# Patient Record
Sex: MALE | Race: WHITE | NOT HISPANIC OR LATINO | Employment: FULL TIME | URBAN - METROPOLITAN AREA
[De-identification: names, ages, dates, MRNs, and addresses within clinical notes are randomized per-mention and may not be internally consistent; named-entity substitution may affect disease eponyms.]

---

## 2017-11-07 ENCOUNTER — HOSPITAL ENCOUNTER (EMERGENCY)
Facility: HOSPITAL | Age: 32
Discharge: HOME | End: 2017-11-07
Attending: EMERGENCY MEDICINE
Payer: COMMERCIAL

## 2017-11-07 VITALS
SYSTOLIC BLOOD PRESSURE: 134 MMHG | WEIGHT: 190 LBS | RESPIRATION RATE: 16 BRPM | BODY MASS INDEX: 23.14 KG/M2 | OXYGEN SATURATION: 97 % | HEART RATE: 63 BPM | HEIGHT: 76 IN | DIASTOLIC BLOOD PRESSURE: 84 MMHG | TEMPERATURE: 98.5 F

## 2017-11-07 DIAGNOSIS — F14.10 COCAINE ABUSE (HCC): Primary | ICD-10-CM

## 2017-11-07 LAB
AMPHETAMINES SERPL QL SCN: NEGATIVE
ANION GAP SERPL CALCULATED.3IONS-SCNC: 9 MMOL/L (ref 4–13)
ATRIAL RATE: 70 BPM
BARBITURATES UR QL: NEGATIVE
BASOPHILS # BLD AUTO: 0.06 THOUSANDS/ΜL (ref 0–0.1)
BASOPHILS NFR BLD AUTO: 1 % (ref 0–1)
BENZODIAZ UR QL: NEGATIVE
BUN SERPL-MCNC: 6 MG/DL (ref 5–25)
CALCIUM SERPL-MCNC: 9.1 MG/DL (ref 8.3–10.1)
CHLORIDE SERPL-SCNC: 103 MMOL/L (ref 100–108)
CLARITY, POC: CLEAR
CO2 SERPL-SCNC: 27 MMOL/L (ref 21–32)
COCAINE UR QL: POSITIVE
COLOR, POC: YELLOW
CREAT SERPL-MCNC: 0.78 MG/DL (ref 0.6–1.3)
EOSINOPHIL # BLD AUTO: 0.14 THOUSAND/ΜL (ref 0–0.61)
EOSINOPHIL NFR BLD AUTO: 2 % (ref 0–6)
ERYTHROCYTE [DISTWIDTH] IN BLOOD BY AUTOMATED COUNT: 13.8 % (ref 11.6–15.1)
ETHANOL SERPL-MCNC: <3 MG/DL (ref 0–3)
EXT BILIRUBIN, UA: NORMAL
EXT BLOOD URINE: NEGATIVE
EXT GLUCOSE, UA: NEGATIVE
EXT KETONES: NEGATIVE
EXT NITRITE, UA: NEGATIVE
EXT PH, UA: 6.5
EXT PROTEIN, UA: NEGATIVE
EXT SPECIFIC GRAVITY, UA: 1.01
EXT UROBILINOGEN: NEGATIVE
GFR SERPL CREATININE-BSD FRML MDRD: 121 ML/MIN/1.73SQ M
GLUCOSE SERPL-MCNC: 87 MG/DL (ref 65–140)
HCT VFR BLD AUTO: 45.8 % (ref 36.5–49.3)
HGB BLD-MCNC: 15.7 G/DL (ref 12–17)
LYMPHOCYTES # BLD AUTO: 2.25 THOUSANDS/ΜL (ref 0.6–4.47)
LYMPHOCYTES NFR BLD AUTO: 24 % (ref 14–44)
MCH RBC QN AUTO: 32.2 PG (ref 26.8–34.3)
MCHC RBC AUTO-ENTMCNC: 34.3 G/DL (ref 31.4–37.4)
MCV RBC AUTO: 94 FL (ref 82–98)
METHADONE UR QL: NEGATIVE
MONOCYTES # BLD AUTO: 0.7 THOUSAND/ΜL (ref 0.17–1.22)
MONOCYTES NFR BLD AUTO: 8 % (ref 4–12)
NEUTROPHILS # BLD AUTO: 6.19 THOUSANDS/ΜL (ref 1.85–7.62)
NEUTS SEG NFR BLD AUTO: 65 % (ref 43–75)
OPIATES UR QL SCN: NEGATIVE
P AXIS: 59 DEGREES
PCP UR QL: NEGATIVE
PLATELET # BLD AUTO: 208 THOUSANDS/UL (ref 149–390)
PMV BLD AUTO: 9.8 FL (ref 8.9–12.7)
POTASSIUM SERPL-SCNC: 3.8 MMOL/L (ref 3.5–5.3)
PR INTERVAL: 190 MS
QRS AXIS: 86 DEGREES
QRSD INTERVAL: 94 MS
QT INTERVAL: 374 MS
QTC INTERVAL: 395 MS
RBC # BLD AUTO: 4.88 MILLION/UL (ref 3.88–5.62)
SODIUM SERPL-SCNC: 139 MMOL/L (ref 136–145)
T WAVE AXIS: 68 DEGREES
THC UR QL: POSITIVE
VENTRICULAR RATE: 67 BPM
WBC # BLD AUTO: 9.34 THOUSAND/UL (ref 4.31–10.16)
WBC # BLD EST: NEGATIVE 10*3/UL

## 2017-11-07 PROCEDURE — 36415 COLL VENOUS BLD VENIPUNCTURE: CPT | Performed by: EMERGENCY MEDICINE

## 2017-11-07 PROCEDURE — 99284 EMERGENCY DEPT VISIT MOD MDM: CPT

## 2017-11-07 PROCEDURE — 81002 URINALYSIS NONAUTO W/O SCOPE: CPT | Performed by: EMERGENCY MEDICINE

## 2017-11-07 PROCEDURE — 80320 DRUG SCREEN QUANTALCOHOLS: CPT | Performed by: EMERGENCY MEDICINE

## 2017-11-07 PROCEDURE — 93005 ELECTROCARDIOGRAM TRACING: CPT

## 2017-11-07 PROCEDURE — 80048 BASIC METABOLIC PNL TOTAL CA: CPT | Performed by: EMERGENCY MEDICINE

## 2017-11-07 PROCEDURE — 85025 COMPLETE CBC W/AUTO DIFF WBC: CPT | Performed by: EMERGENCY MEDICINE

## 2017-11-07 PROCEDURE — 80307 DRUG TEST PRSMV CHEM ANLYZR: CPT | Performed by: EMERGENCY MEDICINE

## 2017-11-07 RX ORDER — SULFAMETHOXAZOLE AND TRIMETHOPRIM 800; 160 MG/1; MG/1
1 TABLET ORAL 2 TIMES DAILY
Qty: 14 TABLET | Refills: 0 | Status: CANCELLED | OUTPATIENT
Start: 2017-11-07 | End: 2017-11-14

## 2017-11-07 NOTE — ED NOTES
D/C instructions discussed  Educated on f/u, self care and s/s of when to return to the ED  All questions answered  Ambulatory off unit with steady gait and no s/s of acute distress  D/C also provided by CW  Friend at bedside to drive pt home        Nicola Cee RN  11/07/17 2299

## 2017-11-07 NOTE — DISCHARGE INSTRUCTIONS
Cocaine Abuse, Ambulatory Care   GENERAL INFORMATION:   Cocaine abuse  develops when you need more cocaine to get the same feelings of happiness you got from lower amounts  You may have cocaine withdrawal if you have used cocaine for a long time and you suddenly use less or stop using it  Common symptoms include the following:   · Use of more cocaine than you first wanted to use     · No ability to decrease or control your use of cocaine    · Spending much of your time using cocaine, or dealing with a hangover after you use cocaine    · Less time spent around others, at work, or doing activities that you enjoy    · Continued cocaine use, even when it causes physical or mental problems  Signs and symptoms of cocaine withdrawal:   · Seizure    · Sweating, shaking, or a fast heartbeat    · Seeing, hearing, or feeling things that are not really there    · Unpleasant dreams that seem real    · Severe sadness or fatigue    · Restlessness, nervousness, or anxiety    · Trouble sleeping or difficulty waking up    · Nausea or vomiting  Seek immediate care for the following symptoms:   · Chest pain, sweating, or shortness of breath    · Weakness on one side of your body    · Seizure    · Severe headache, confusion, or feeling very nervous    · Fever over 101°F (38 3°C) after you use cocaine    · Coughing or spitting up blood    · Feeling like hurting yourself or someone else    · Severe abdominal pain  Follow up with your healthcare provider as directed:  Write down your questions so you remember to ask them during your visits  CARE AGREEMENT:   You have the right to help plan your care  Learn about your health condition and how it may be treated  Discuss treatment options with your caregivers to decide what care you want to receive  You always have the right to refuse treatment  The above information is an  only  It is not intended as medical advice for individual conditions or treatments   Talk to your doctor, nurse or pharmacist before following any medical regimen to see if it is safe and effective for you  © 2014 [address] is for End User's use only and may not be sold, redistributed or otherwise used for commercial purposes  All illustrations and images included in CareNotes® are the copyrighted property of [employer]  or Ronnie Pelayo  Cellulitis, Ambulatory Care   GENERAL INFORMATION:   Cellulitis  is a skin infection caused by bacteria  Common symptoms include the following:   · Fever    · A red, warm, swollen area on your skin    · Pain when the area is touched    · Bumps or blisters (abscess) that may drain pus    · Bumpy, raised skin that feels like an orange peel  Seek immediate care for the following symptoms:   · An increase in pain, redness, warmth, and size    · Red streaks coming from the infected area    · A thin, gray-brown discharge coming from your infected skin area    · A crackling under your skin when you touch it    · Purple dots or bumps on your skin, or bleeding under your skin    · New swelling and pain in your legs    · Sudden trouble breathing or chest pain  Treatment for cellulitis  may include medicines to treat the bacterial infection or decrease pain  The infection may need to be cleaned out  Damaged, dead, or infected tissue may need to be cut away to help your wound heal   Manage your symptoms:   · Elevate your wound above the level of your heart  as often as you can  This will help decrease swelling and pain  Prop your wound on pillows or blankets to keep it elevated comfortably  · Clean your wound as directed  You may need to wash the wound with soap and water  Look for signs of infection  · Wear pressure stockings as directed  The stockings are tight and put pressure on your legs  This improves blood flow and decreases swelling  Prevent cellulitis:   · Wash your hands often  Use soap and water   Wash your hands after you use the bathroom, change a child's diapers, or sneeze  Wash your hands before you prepare or eat food  Use lotion to prevent dry, cracked skin  · Do not share personal items, such as towels, clothing, and razors  · Clean exercise equipment  with germ-killing  before and after you use it  Follow up with your healthcare provider as directed:  Write down your questions so you remember to ask them during your visits  CARE AGREEMENT:   You have the right to help plan your care  Learn about your health condition and how it may be treated  Discuss treatment options with your caregivers to decide what care you want to receive  You always have the right to refuse treatment  The above information is an  only  It is not intended as medical advice for individual conditions or treatments  Talk to your doctor, nurse or pharmacist before following any medical regimen to see if it is safe and effective for you  © 2014 [address] is for End User's use only and may not be sold, redistributed or otherwise used for commercial purposes  All illustrations and images included in CareNotes® are the copyrighted property of [employer]  or Ronnie Pelayo

## 2017-11-07 NOTE — ED PROVIDER NOTES
History  Chief Complaint   Patient presents with    Detox Evaluation     Pt states that he uses cocaine and Dorie Spies and would like to get rehab  Pt states the last time he used cocaine was last night  Pt states that he uses everyday for about 2 years  History provided by:  Patient  Detox Evaluation   Similar prior episodes: yes    Severity:  Moderate  Onset quality:  Gradual  Timing:  Constant  Progression:  Worsening  Chronicity:  New  Suspected agents:  Alcohol, cocaine and marijuana  Associated symptoms: no abdominal pain, no confusion, no headaches, no nausea, no shortness of breath, no suicidal ideation (At times has had suicidal ideation in the past) and no weakness    Risk factors: mental illness and psychiatric hx    Risk factors: no chronic illness        None       Past Medical History:   Diagnosis Date    Depression     Psychiatric disorder     bipolar       History reviewed  No pertinent surgical history  History reviewed  No pertinent family history  I have reviewed and agree with the history as documented  Social History   Substance Use Topics    Smoking status: Current Every Day Smoker    Smokeless tobacco: Never Used    Alcohol use Yes      Comment: 2 cases of beer daily         Review of Systems   Constitutional: Negative for chills and fever  HENT: Negative for rhinorrhea, sore throat and trouble swallowing  Eyes: Negative for pain  Respiratory: Negative for cough, shortness of breath, wheezing and stridor  Cardiovascular: Negative for chest pain and leg swelling  Gastrointestinal: Negative for abdominal pain, diarrhea and nausea  Endocrine: Negative for polyuria  Genitourinary: Negative for dysuria, flank pain and urgency  Musculoskeletal: Negative for joint swelling, myalgias and neck stiffness  Skin: Negative for rash  Allergic/Immunologic: Negative for immunocompromised state     Neurological: Negative for dizziness, syncope, weakness, numbness and headaches  Psychiatric/Behavioral: Negative for confusion and suicidal ideas (At times has had suicidal ideation in the past)  All other systems reviewed and are negative  Physical Exam  ED Triage Vitals [11/07/17 1347]   Temperature Pulse Respirations Blood Pressure SpO2   98 5 °F (36 9 °C) 66 16 145/90 100 %      Temp Source Heart Rate Source Patient Position - Orthostatic VS BP Location FiO2 (%)   Oral Monitor Sitting Right arm --      Pain Score       No Pain           Orthostatic Vital Signs  Vitals:    11/07/17 1347 11/07/17 1446 11/07/17 1647 11/07/17 1747   BP: 145/90 138/76 144/81 134/84   Pulse: 66 68 62 63   Patient Position - Orthostatic VS: Sitting Lying Sitting Sitting       Physical Exam   Constitutional: He is oriented to person, place, and time  He appears well-developed and well-nourished  HENT:   Head: Normocephalic and atraumatic  Eyes: EOM are normal  Pupils are equal, round, and reactive to light  Neck: Normal range of motion  Neck supple  Cardiovascular: Normal rate and regular rhythm  Exam reveals no friction rub  No murmur heard  Pulmonary/Chest: Breath sounds normal  No respiratory distress  He has no wheezes  He has no rales  Abdominal: Soft  Bowel sounds are normal  He exhibits no distension  There is no tenderness  Musculoskeletal: Normal range of motion  He exhibits no edema or tenderness  Neurological: He is alert and oriented to person, place, and time  Skin: Skin is warm  No rash noted  Psychiatric:   Tearful states he has had thoughts to try to kill himself before  Nursing note and vitals reviewed        ED Medications  Medications - No data to display    Diagnostic Studies  Results Reviewed     Procedure Component Value Units Date/Time    Ethanol [[id number]]  (Normal) Collected:  11/07/17 1416    Lab Status:  Final result Specimen:  Blood from Arm, Right Updated:  11/07/17 1438     Ethanol Lvl <3 mg/dL     Basic metabolic panel [[id number]] Collected: 11/07/17 1416    Lab Status:  Final result Specimen:  Blood from Arm, Right Updated:  11/07/17 1435     Sodium 139 mmol/L      Potassium 3 8 mmol/L      Chloride 103 mmol/L      CO2 27 mmol/L      Anion Gap 9 mmol/L      BUN 6 mg/dL      Creatinine 0 78 mg/dL      Glucose 87 mg/dL      Calcium 9 1 mg/dL      eGFR 121 ml/min/1 73sq m     Narrative:         National Kidney Disease Education Program recommendations are as follows:  GFR calculation is accurate only with a steady state creatinine  Chronic Kidney disease less than 60 ml/min/1 73 sq  meters  Kidney failure less than 15 ml/min/1 73 sq  meters  Rapid drug screen, urine [[id number]]  (Abnormal) Collected:  11/07/17 1419    Lab Status:  Final result Specimen:  Urine from Urine, Clean Catch Updated:  11/07/17 1435     Amph/Meth UR Negative     Barbiturate Ur Negative     Benzodiazepine Urine Negative     Cocaine Urine Positive (A)     Methadone Urine Negative     Opiate Urine Negative     PCP Ur Negative     THC Urine Positive (A)    Narrative:         Presumptive report  If requested, specimen will be sent to reference lab for confirmation  FOR MEDICAL PURPOSES ONLY  IF CONFIRMATION NEEDED PLEASE CONTACT THE LAB WITHIN 5 DAYS      Drug Screen Cutoff Levels:  AMPHETAMINE/METHAMPHETAMINES  1000 ng/mL  BARBITURATES     200 ng/mL  BENZODIAZEPINES     200 ng/mL  COCAINE      300 ng/mL  METHADONE      300 ng/mL  OPIATES      300 ng/mL  PHENCYCLIDINE     25 ng/mL  THC       50 ng/mL    CBC and differential [[id number]]  (Normal) Collected:  11/07/17 1416    Lab Status:  Final result Specimen:  Blood from Arm, Right Updated:  11/07/17 1429     WBC 9 34 Thousand/uL      RBC 4 88 Million/uL      Hemoglobin 15 7 g/dL      Hematocrit 45 8 %      MCV 94 fL      MCH 32 2 pg      MCHC 34 3 g/dL      RDW 13 8 %      MPV 9 8 fL      Platelets 686 Thousands/uL      Neutrophils Relative 65 %      Lymphocytes Relative 24 %      Monocytes Relative 8 %      Eosinophils Relative 2 %      Basophils Relative 1 %      Neutrophils Absolute 6 19 Thousands/µL      Lymphocytes Absolute 2 25 Thousands/µL      Monocytes Absolute 0 70 Thousand/µL      Eosinophils Absolute 0 14 Thousand/µL      Basophils Absolute 0 06 Thousands/µL     POCT urinalysis dipstick [[id number]]  (Normal) Resulted:  11/07/17 1423    Lab Status:  Final result Specimen:  Urine Updated:  11/07/17 1424     Color, UA yellow     Clarity, UA clear     EXT Glucose, UA negative     EXT Bilirubin, UA (Ref: Negative) small     EXT Ketones, UA (Ref: Negative) negative     EXT Spec Grav, UA 1 010     EXT Blood, UA (Ref: Negative) negative     EXT pH, UA 6 5     EXT Protein, UA (Ref: Negative) negative     EXT Urobilinogen, UA (Ref: 0 2- 1 0) negative     EXT Leukocytes, UA (Ref: Negative) negative     EXT Nitrite, UA (Ref: Negative) negative                 No orders to display              Procedures  ECG 12 Lead Documentation  Date/Time: 11/7/2017 2:00 PM  Performed by: Winsome Elizabeth by: Huma Russo     ECG reviewed by me, the ED Provider: yes    Patient location:  ED  Previous ECG:     Previous ECG:  Compared to current    Similarity:  No change  Interpretation:     Interpretation: normal    Rate:     ECG rate assessment: normal    Rhythm:     Rhythm: sinus rhythm    Ectopy:     Ectopy: none    QRS:     QRS axis:  Normal    QRS intervals:  Normal  Conduction:     Conduction: normal    ST segments:     ST segments:  Normal  T waves:     T waves: normal             Phone Contacts  ED Phone Contact    ED Course  ED Course                                MDM  Number of Diagnoses or Management Options  Diagnosis management comments: 2:41 PM patient is medically clear for psych eval, no acute signs of withdrawal at this time in the ED            Amount and/or Complexity of Data Reviewed  Clinical lab tests: ordered and reviewed      CritCare Time    Disposition  Final diagnoses:   Cocaine abuse     Time reflects when diagnosis was documented in both MDM as applicable and the Disposition within this note     Time User Action Codes Description Comment    11/7/2017  4:36 PM Med Home Add [R79 310] Cellulitis of right upper extremity     11/7/2017  4:37 PM Med Home Add [F11 10] Heroin abuse     11/7/2017  4:38 PM Med Home Modify [F11 10] Heroin abuse     11/7/2017  4:38 PM Med Home Remove [K54 383] Cellulitis of right upper extremity     11/7/2017  4:38 PM Med Home Remove [F11 10] Heroin abuse     11/7/2017  4:38 PM Med Home Add [F14 10] Cocaine abuse       ED Disposition     ED Disposition Condition Comment    Discharge  Hemanth Swanson discharge to home/self care  Condition at discharge: Good        Follow-up Information    None       There are no discharge medications for this patient  No discharge procedures on file      ED Provider  Electronically Signed by           Leopold Schmitt, DO  11/08/17 4944

## 2017-11-07 NOTE — ED NOTES
Spoke with [email or web address] Hermila from [facility] crisis  Patient spoke with [email or web address] Hermila  Patient is looking for help  Stated to [employer] that he uses $300  in crack and two 30 count cases of beer per day  Patient stated his last use was 02:00 today      I explained to [employer] would have to be medically cleared before we could speak to patient about treatment       Pham Valentin  11/07/17 4006

## 2017-11-07 NOTE — ED NOTES
Pt was brought to ED by his cousin  Initially he stated he was seeking inpatient treatment for substance use  Pt admits to using alcohol, cannabis and crack  States when he drinks he drinks up to 2 cases of beer per day, $20 of marijuana and up to $300 worth of crack  Pt states he does not use everyday  He denies SI, HI, A/H or V/H  He did however admit to some depression  After Intake / SA was completed pt stated he did not want to do a 30 day inpatient program   Pt was willing to try outpatient treatment which he states he had success with in the past   Pt does not have insurance  Pt was provided with contact information for [facility] and   Pt was encouraged to return to ED if symptoms worsened and to call [facility] crisis if need be

## 2020-09-13 ENCOUNTER — APPOINTMENT (EMERGENCY)
Dept: CT IMAGING | Facility: HOSPITAL | Age: 35
End: 2020-09-13
Payer: COMMERCIAL

## 2020-09-13 ENCOUNTER — HOSPITAL ENCOUNTER (EMERGENCY)
Facility: HOSPITAL | Age: 35
Discharge: HOME | End: 2020-09-13
Payer: COMMERCIAL

## 2020-09-13 VITALS
HEIGHT: 76 IN | DIASTOLIC BLOOD PRESSURE: 90 MMHG | OXYGEN SATURATION: 97 % | BODY MASS INDEX: 25.57 KG/M2 | WEIGHT: 210 LBS | SYSTOLIC BLOOD PRESSURE: 141 MMHG | TEMPERATURE: 97.6 F | RESPIRATION RATE: 18 BRPM | HEART RATE: 103 BPM

## 2020-09-13 DIAGNOSIS — S30.1XXA CONTUSION OF ABDOMINAL WALL, INITIAL ENCOUNTER: ICD-10-CM
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
## 2020-09-13 DIAGNOSIS — S16.1XXA STRAIN OF NECK MUSCLE, INITIAL ENCOUNTER: ICD-10-CM

## 2020-09-13 LAB
ALBUMIN SERPL BCP-MCNC: 4.5 G/DL (ref 3.4–4.8)
ALP SERPL-CCNC: 108.7 U/L (ref 10–129)
ALT SERPL W P-5'-P-CCNC: 82 U/L (ref 5–63)
ANION GAP SERPL CALCULATED.3IONS-SCNC: 9 MMOL/L (ref 4–13)
APTT PPP: 29 SECONDS (ref 23–31)
AST SERPL W P-5'-P-CCNC: 39 U/L (ref 15–41)
BASOPHILS # BLD AUTO: 0.04 THOUSANDS/ΜL (ref 0–0.1)
BASOPHILS NFR BLD AUTO: 0 % (ref 0–1)
BILIRUB SERPL-MCNC: 0.72 MG/DL (ref 0.3–1.2)
BILIRUB UR QL STRIP: NEGATIVE
BUN SERPL-MCNC: 13 MG/DL (ref 6–20)
CALCIUM SERPL-MCNC: 9.5 MG/DL (ref 8.4–10.2)
CHLORIDE SERPL-SCNC: 106 MMOL/L (ref 96–108)
CLARITY UR: CLEAR
CO2 SERPL-SCNC: 25 MMOL/L (ref 22–33)
COLOR UR: YELLOW
CREAT SERPL-MCNC: 0.77 MG/DL (ref 0.5–1.2)
EOSINOPHIL # BLD AUTO: 0.17 THOUSAND/ΜL (ref 0–0.61)
EOSINOPHIL NFR BLD AUTO: 2 % (ref 0–6)
ERYTHROCYTE [DISTWIDTH] IN BLOOD BY AUTOMATED COUNT: 14 % (ref 11.6–15.1)
GFR SERPL CREATININE-BSD FRML MDRD: 119 ML/MIN/1.73SQ M
GLUCOSE SERPL-MCNC: 100 MG/DL (ref 65–140)
GLUCOSE UR STRIP-MCNC: NEGATIVE MG/DL
HCT VFR BLD AUTO: 46.7 % (ref 36.5–49.3)
HGB BLD-MCNC: 16 G/DL (ref 12–17)
HGB UR QL STRIP.AUTO: NEGATIVE
IMM GRANULOCYTES # BLD AUTO: 0.02 THOUSAND/UL (ref 0–0.2)
IMM GRANULOCYTES NFR BLD AUTO: 0 % (ref 0–2)
INR PPP: 1.01 (ref 0.9–1.1)
KETONES UR STRIP-MCNC: NEGATIVE MG/DL
LEUKOCYTE ESTERASE UR QL STRIP: NEGATIVE
LYMPHOCYTES # BLD AUTO: 2.58 THOUSANDS/ΜL (ref 0.6–4.47)
LYMPHOCYTES NFR BLD AUTO: 28 % (ref 14–44)
MCH RBC QN AUTO: 31.3 PG (ref 26.8–34.3)
MCHC RBC AUTO-ENTMCNC: 34.3 G/DL (ref 31.4–37.4)
MCV RBC AUTO: 91 FL (ref 82–98)
MONOCYTES # BLD AUTO: 0.71 THOUSAND/ΜL (ref 0.17–1.22)
MONOCYTES NFR BLD AUTO: 8 % (ref 4–12)
NEUTROPHILS # BLD AUTO: 5.82 THOUSANDS/ΜL (ref 1.85–7.62)
NEUTS SEG NFR BLD AUTO: 62 % (ref 43–75)
NITRITE UR QL STRIP: NEGATIVE
PH UR STRIP.AUTO: 6 [PH]
PLATELET # BLD AUTO: 243 THOUSANDS/UL (ref 149–390)
PMV BLD AUTO: 9.9 FL (ref 8.9–12.7)
POTASSIUM SERPL-SCNC: 3.9 MMOL/L (ref 3.5–5)
PROT SERPL-MCNC: 7.3 G/DL (ref 6.4–8.3)
PROT UR STRIP-MCNC: NEGATIVE MG/DL
PROTHROMBIN TIME: 10.7 SECONDS (ref 9.5–12.1)
RBC # BLD AUTO: 5.12 MILLION/UL (ref 3.88–5.62)
SODIUM SERPL-SCNC: 140 MMOL/L (ref 133–145)
SP GR UR STRIP.AUTO: <=1.005 (ref 1–1.03)
UROBILINOGEN UR QL STRIP.AUTO: 0.2 E.U./DL
WBC # BLD AUTO: 9.34 THOUSAND/UL (ref 4.31–10.16)

## 2020-09-13 PROCEDURE — G1004 CDSM NDSC: HCPCS

## 2020-09-13 PROCEDURE — 36415 COLL VENOUS BLD VENIPUNCTURE: CPT

## 2020-09-13 PROCEDURE — 80053 COMPREHEN METABOLIC PANEL: CPT

## 2020-09-13 PROCEDURE — 85730 THROMBOPLASTIN TIME PARTIAL: CPT

## 2020-09-13 PROCEDURE — 99284 EMERGENCY DEPT VISIT MOD MDM: CPT

## 2020-09-13 PROCEDURE — 74177 CT ABD & PELVIS W/CONTRAST: CPT

## 2020-09-13 PROCEDURE — 85025 COMPLETE CBC W/AUTO DIFF WBC: CPT

## 2020-09-13 PROCEDURE — 99285 EMERGENCY DEPT VISIT HI MDM: CPT

## 2020-09-13 PROCEDURE — 85610 PROTHROMBIN TIME: CPT

## 2020-09-13 PROCEDURE — 81003 URINALYSIS AUTO W/O SCOPE: CPT

## 2020-09-13 PROCEDURE — 72125 CT NECK SPINE W/O DYE: CPT

## 2020-09-13 RX ORDER — CYCLOBENZAPRINE HCL 10 MG
10 TABLET ORAL 2 TIMES DAILY PRN
Qty: 20 TABLET | Refills: 0 | Status: SHIPPED | OUTPATIENT
Start: 2020-09-13

## 2020-09-13 RX ORDER — NAPROXEN 500 MG/1
500 TABLET ORAL 2 TIMES DAILY WITH MEALS
Qty: 30 TABLET | Refills: 0 | Status: SHIPPED | OUTPATIENT
Start: 2020-09-13

## 2020-09-13 RX ADMIN — IOHEXOL 100 ML: 350 INJECTION, SOLUTION INTRAVENOUS at 11:14

## 2020-09-13 NOTE — ED NOTES
202 Ferny Jansen, RN  09/13/20 5360
Patient transported to [address] [place] [address] Ebony Rai RN  09/13/20 1111
120
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
## 2020-09-13 NOTE — Clinical Note
Jaspal Adam was seen and treated in our emergency department on 9/13/2020  Diagnosis:     Bernice Stockton  may return to work on return date  He may return on this date: 09/15/2020         If you have any questions or concerns, please don't hesitate to call        Chong Anderson MD    ______________________________           _______________          _______________  Hospital Representative                              Date                                Time

## 2021-06-18 ENCOUNTER — HOSPITAL ENCOUNTER (EMERGENCY)
Facility: HOSPITAL | Age: 36
Discharge: HOME | End: 2021-06-18
Attending: EMERGENCY MEDICINE | Admitting: EMERGENCY MEDICINE
Payer: COMMERCIAL

## 2021-06-18 ENCOUNTER — APPOINTMENT (EMERGENCY)
Dept: ULTRASOUND IMAGING | Facility: HOSPITAL | Age: 36
End: 2021-06-18
Payer: COMMERCIAL

## 2021-06-18 VITALS
RESPIRATION RATE: 15 BRPM | DIASTOLIC BLOOD PRESSURE: 85 MMHG | OXYGEN SATURATION: 98 % | SYSTOLIC BLOOD PRESSURE: 125 MMHG | HEART RATE: 71 BPM | TEMPERATURE: 98.2 F

## 2021-06-18 DIAGNOSIS — N50.89 SWELLING OF LEFT TESTICLE: ICD-10-CM

## 2021-06-18 DIAGNOSIS — N50.812 PAIN IN LEFT TESTICLE: Primary | ICD-10-CM

## 2021-06-18 LAB
ALBUMIN SERPL BCP-MCNC: 4.2 G/DL (ref 3.4–4.8)
ALP SERPL-CCNC: 118.3 U/L (ref 10–129)
ALT SERPL W P-5'-P-CCNC: 23 U/L (ref 5–63)
ANION GAP SERPL CALCULATED.3IONS-SCNC: 7 MMOL/L (ref 4–13)
AST SERPL W P-5'-P-CCNC: 20 U/L (ref 15–41)
BASOPHILS # BLD AUTO: 0.05 THOUSANDS/ΜL (ref 0–0.1)
BASOPHILS NFR BLD AUTO: 1 % (ref 0–1)
BILIRUB SERPL-MCNC: 0.76 MG/DL (ref 0.3–1.2)
BILIRUB UR QL STRIP: NEGATIVE
BUN SERPL-MCNC: 8 MG/DL (ref 6–20)
C TRACH DNA SPEC QL NAA+PROBE: NEGATIVE
CALCIUM SERPL-MCNC: 9.4 MG/DL (ref 8.4–10.2)
CHLORIDE SERPL-SCNC: 105 MMOL/L (ref 96–108)
CLARITY UR: CLEAR
CO2 SERPL-SCNC: 27 MMOL/L (ref 22–33)
COLOR UR: YELLOW
CREAT SERPL-MCNC: 0.9 MG/DL (ref 0.5–1.2)
EOSINOPHIL # BLD AUTO: 0.09 THOUSAND/ΜL (ref 0–0.61)
EOSINOPHIL NFR BLD AUTO: 1 % (ref 0–6)
ERYTHROCYTE [DISTWIDTH] IN BLOOD BY AUTOMATED COUNT: 14 % (ref 11.6–15.1)
GFR SERPL CREATININE-BSD FRML MDRD: 111 ML/MIN/1.73SQ M
GLUCOSE SERPL-MCNC: 93 MG/DL (ref 65–140)
GLUCOSE UR STRIP-MCNC: NEGATIVE MG/DL
HCT VFR BLD AUTO: 46.3 % (ref 36.5–49.3)
HGB BLD-MCNC: 16.3 G/DL (ref 12–17)
HGB UR QL STRIP.AUTO: NEGATIVE
IMM GRANULOCYTES # BLD AUTO: 0.02 THOUSAND/UL (ref 0–0.2)
IMM GRANULOCYTES NFR BLD AUTO: 0 % (ref 0–2)
KETONES UR STRIP-MCNC: NEGATIVE MG/DL
LEUKOCYTE ESTERASE UR QL STRIP: NEGATIVE
LYMPHOCYTES # BLD AUTO: 2.38 THOUSANDS/ΜL (ref 0.6–4.47)
LYMPHOCYTES NFR BLD AUTO: 22 % (ref 14–44)
MCH RBC QN AUTO: 31.7 PG (ref 26.8–34.3)
MCHC RBC AUTO-ENTMCNC: 35.2 G/DL (ref 31.4–37.4)
MCV RBC AUTO: 90 FL (ref 82–98)
MONOCYTES # BLD AUTO: 1 THOUSAND/ΜL (ref 0.17–1.22)
MONOCYTES NFR BLD AUTO: 9 % (ref 4–12)
N GONORRHOEA DNA SPEC QL NAA+PROBE: NEGATIVE
NEUTROPHILS # BLD AUTO: 7.39 THOUSANDS/ΜL (ref 1.85–7.62)
NEUTS SEG NFR BLD AUTO: 67 % (ref 43–75)
NITRITE UR QL STRIP: NEGATIVE
PH UR STRIP.AUTO: 6.5 [PH]
PLATELET # BLD AUTO: 209 THOUSANDS/UL (ref 149–390)
PMV BLD AUTO: 10.3 FL (ref 8.9–12.7)
POTASSIUM SERPL-SCNC: 3.8 MMOL/L (ref 3.5–5)
PROT SERPL-MCNC: 7.4 G/DL (ref 6.4–8.3)
PROT UR STRIP-MCNC: NEGATIVE MG/DL
RBC # BLD AUTO: 5.14 MILLION/UL (ref 3.88–5.62)
SODIUM SERPL-SCNC: 139 MMOL/L (ref 133–145)
SP GR UR STRIP.AUTO: 1.01 (ref 1–1.03)
UROBILINOGEN UR QL STRIP.AUTO: 4 E.U./DL
WBC # BLD AUTO: 10.93 THOUSAND/UL (ref 4.31–10.16)

## 2021-06-18 PROCEDURE — 76870 US EXAM SCROTUM: CPT

## 2021-06-18 PROCEDURE — 96372 THER/PROPH/DIAG INJ SC/IM: CPT

## 2021-06-18 PROCEDURE — 96374 THER/PROPH/DIAG INJ IV PUSH: CPT

## 2021-06-18 PROCEDURE — 99285 EMERGENCY DEPT VISIT HI MDM: CPT | Performed by: EMERGENCY MEDICINE

## 2021-06-18 PROCEDURE — 85025 COMPLETE CBC W/AUTO DIFF WBC: CPT | Performed by: STUDENT IN AN ORGANIZED HEALTH CARE EDUCATION/TRAINING PROGRAM

## 2021-06-18 PROCEDURE — 87491 CHLMYD TRACH DNA AMP PROBE: CPT | Performed by: STUDENT IN AN ORGANIZED HEALTH CARE EDUCATION/TRAINING PROGRAM

## 2021-06-18 PROCEDURE — 81003 URINALYSIS AUTO W/O SCOPE: CPT | Performed by: STUDENT IN AN ORGANIZED HEALTH CARE EDUCATION/TRAINING PROGRAM

## 2021-06-18 PROCEDURE — 36415 COLL VENOUS BLD VENIPUNCTURE: CPT | Performed by: STUDENT IN AN ORGANIZED HEALTH CARE EDUCATION/TRAINING PROGRAM

## 2021-06-18 PROCEDURE — 99284 EMERGENCY DEPT VISIT MOD MDM: CPT

## 2021-06-18 PROCEDURE — 87591 N.GONORRHOEAE DNA AMP PROB: CPT | Performed by: STUDENT IN AN ORGANIZED HEALTH CARE EDUCATION/TRAINING PROGRAM

## 2021-06-18 PROCEDURE — 80053 COMPREHEN METABOLIC PANEL: CPT | Performed by: STUDENT IN AN ORGANIZED HEALTH CARE EDUCATION/TRAINING PROGRAM

## 2021-06-18 RX ORDER — DOXYCYCLINE HYCLATE 100 MG/1
100 CAPSULE ORAL 2 TIMES DAILY
Qty: 28 CAPSULE | Refills: 0 | Status: SHIPPED | OUTPATIENT
Start: 2021-06-18 | End: 2021-07-02

## 2021-06-18 RX ORDER — MORPHINE SULFATE 4 MG/ML
4 INJECTION, SOLUTION INTRAMUSCULAR; INTRAVENOUS ONCE
Status: COMPLETED | OUTPATIENT
Start: 2021-06-18 | End: 2021-06-18

## 2021-06-18 RX ADMIN — CEFTRIAXONE SODIUM 500 MG: 1 INJECTION, POWDER, FOR SOLUTION INTRAMUSCULAR; INTRAVENOUS at 13:53

## 2021-06-18 RX ADMIN — MORPHINE SULFATE 4 MG: 4 INJECTION INTRAVENOUS at 10:34

## 2021-06-18 NOTE — ED PROVIDER NOTES
History  Chief Complaint   Patient presents with    Groin Pain     pt states states left groin pain and testicle pain x2 a couple weeks, states the pain and swelling is increasing in size, pt states hx of umbilical hernia and psb inguinal hernia  Pt c/o nausea and some constipation     Patient 28-year-old male constant past medical history presents to ED today with complaint of testicular pain x3 weeks  Patient states he noticed some swelling in the left testicle about 3 weeks ago and has been gradually increasing since that time  The patient originally believed that he had an inguinal hernia because he currently has an umbilical hernia which she believes left similar  Patient admits to pain in the left testicle and left groin rated 10/10 it is a constant aching pain that is worse with pressure and when bearing down to use the bathroom  Patient denies radiation of pain He also admits to associated swelling and testicle that has been gradually increasing over the past 3 weeks  Patient also admits to several episodes of alternating constipation and diarrhea since the onset of testicular swelling  Denies penile pain, genital rash, discharge, dysuria, increased frequency/urgency  Denies fevers/chills, abdominal pain, chest pain, shortness of breath          Prior to Admission Medications   Prescriptions Last Dose Informant Patient Reported? Taking? cyclobenzaprine (FLEXERIL) 10 mg tablet Not Taking at Unknown time  No No   Sig: Take 1 tablet (10 mg total) by mouth 2 (two) times a day as needed for muscle spasms   Patient not taking: Reported on 6/18/2021   naproxen (NAPROSYN) 500 mg tablet Not Taking at Unknown time  No No   Sig: Take 1 tablet (500 mg total) by mouth 2 (two) times a day with meals   Patient not taking: Reported on 6/18/2021      Facility-Administered Medications: None       Past Medical History:   Diagnosis Date    Depression     Psychiatric disorder     bipolar       History reviewed   No pertinent surgical history  History reviewed  No pertinent family history  I have reviewed and agree with the history as documented  E-Cigarette/Vaping    E-Cigarette Use Never User      E-Cigarette/Vaping Substances     Social History     Tobacco Use    Smoking status: Current Every Day Smoker     Packs/day: 1 00     Types: Cigarettes    Smokeless tobacco: Never Used   Vaping Use    Vaping Use: Never used   Substance Use Topics    Alcohol use: Never     Comment: 2 cases of beer daily     Drug use: Yes     Types: Marijuana       Review of Systems   Constitutional: Negative for chills and fever  HENT: Negative  Eyes: Negative  Respiratory: Negative for chest tightness and shortness of breath  Cardiovascular: Negative for chest pain and palpitations  Gastrointestinal: Positive for constipation and diarrhea  Negative for abdominal pain, nausea and vomiting  Genitourinary: Positive for scrotal swelling and testicular pain  Negative for difficulty urinating, discharge, dysuria, flank pain, genital sores, penile pain, penile swelling and urgency  Musculoskeletal: Negative for arthralgias, back pain and neck pain  Skin: Negative for rash  Neurological: Negative for dizziness, light-headedness and headaches  Psychiatric/Behavioral: Negative  All other systems reviewed and are negative  Physical Exam  Physical Exam  Vitals and nursing note reviewed  Exam conducted with a chaperone present Marisabel Esquivel)  Constitutional:       General: He is not in acute distress  Appearance: Normal appearance  He is normal weight  He is not ill-appearing  HENT:      Head: Normocephalic  Mouth/Throat:      Mouth: Mucous membranes are moist    Cardiovascular:      Rate and Rhythm: Normal rate and regular rhythm  Heart sounds: Normal heart sounds  No murmur heard  Pulmonary:      Effort: Pulmonary effort is normal  No respiratory distress  Breath sounds: Normal breath sounds  Chest:      Chest wall: No tenderness  Abdominal:      General: Abdomen is flat  Bowel sounds are normal  There is no distension  Tenderness: There is abdominal tenderness in the suprapubic area  There is no right CVA tenderness, left CVA tenderness, guarding or rebound  Negative signs include Rovsing's sign  Hernia: There is no hernia in the left inguinal area or right inguinal area  Genitourinary:     Pubic Area: No rash or pubic lice  Penis: Normal        Testes:         Left: Tenderness and swelling present  Mass, testicular hydrocele or varicocele not present  Left testis is descended  Epididymis:      Right: Normal       Left: Normal       Jayjay stage (genital): 5  Comments: Examination of right testicle showed no signs of tenderness, mass, hernia, hydrocele  Musculoskeletal:      Cervical back: Normal range of motion  No pain with movement, spinous process tenderness or muscular tenderness  Right lower leg: No edema  Left lower leg: No edema  Lymphadenopathy:      Lower Body: No right inguinal adenopathy  No left inguinal adenopathy  Skin:     General: Skin is warm and dry  Neurological:      General: No focal deficit present  Mental Status: He is alert and oriented to person, place, and time  Sensory: Sensation is intact  Psychiatric:         Attention and Perception: Attention normal          Mood and Affect: Mood normal          Behavior: Behavior normal          Thought Content:  Thought content normal          Judgment: Judgment normal          Vital Signs  ED Triage Vitals [06/18/21 0937]   Temperature Pulse Respirations Blood Pressure SpO2   97 6 °F (36 4 °C) 74 19 153/87 100 %      Temp Source Heart Rate Source Patient Position - Orthostatic VS BP Location FiO2 (%)   Oral Monitor Lying Left arm --      Pain Score       7           Vitals:    06/18/21 0937 06/18/21 1214 06/18/21 1358   BP: 153/87 130/86 125/85   Pulse: 74 76 71 Patient Position - Orthostatic VS: Lying Lying Sitting         Visual Acuity      ED Medications  Medications   morphine (PF) 4 mg/mL injection 4 mg (4 mg Intravenous Given 6/18/21 1034)   cefTRIAXone (ROCEPHIN) 500 mg in sterile water IM only syringe (500 mg Intramuscular Given 6/18/21 1353)       Diagnostic Studies  Results Reviewed     Procedure Component Value Units Date/Time    Comprehensive metabolic panel [[id number]] Collected: 06/18/21 1025    Lab Status: Final result Specimen: Blood from Arm, Right Updated: 06/18/21 1050     Sodium 139 mmol/L      Potassium 3 8 mmol/L      Chloride 105 mmol/L      CO2 27 mmol/L      ANION GAP 7 mmol/L      BUN 8 mg/dL      Creatinine 0 90 mg/dL      Glucose 93 mg/dL      Calcium 9 4 mg/dL      AST 20 U/L      ALT 23 U/L      Alkaline Phosphatase 118 3 U/L      Total Protein 7 4 g/dL      Albumin 4 2 g/dL      Total Bilirubin 0 76 mg/dL      eGFR 111 ml/min/1 73sq m     Narrative:      National Kidney Disease Foundation guidelines for Chronic Kidney Disease (CKD):     Stage 1 with normal or high GFR (GFR > 90 mL/min/1 73 square meters)    Stage 2 Mild CKD (GFR = 60-89 mL/min/1 73 square meters)    Stage 3A Moderate CKD (GFR = 45-59 mL/min/1 73 square meters)    Stage 3B Moderate CKD (GFR = 30-44 mL/min/1 73 square meters)    Stage 4 Severe CKD (GFR = 15-29 mL/min/1 73 square meters)    Stage 5 End Stage CKD (GFR <15 mL/min/1 73 square meters)  Note: GFR calculation is accurate only with a steady state creatinine    UA w Reflex to Microscopic w Reflex to Culture [[id number]]  (Abnormal) Collected: 06/18/21 1036    Lab Status: Final result Specimen: Urine, Clean Catch Updated: 06/18/21 1047     Color, UA Yellow     Clarity, UA Clear     Specific Gravity, UA 1 015     pH, UA 6 5     Leukocytes, UA Negative     Nitrite, UA Negative     Protein, UA Negative mg/dl      Glucose, UA Negative mg/dl      Ketones, UA Negative mg/dl      Urobilinogen, UA 4 0 E U /dl      Bilirubin, UA Negative     Blood, UA Negative    Chlamydia/GC amplified DNA by PCR [[id number]] Collected: 06/18/21 1037    Lab Status: In process Specimen: Urine, Other Updated: 06/18/21 1040    CBC and differential [[id number]]  (Abnormal) Collected: 06/18/21 1025    Lab Status: Final result Specimen: Blood from Arm, Right Updated: 06/18/21 1031     WBC 10 93 Thousand/uL      RBC 5 14 Million/uL      Hemoglobin 16 3 g/dL      Hematocrit 46 3 %      MCV 90 fL      MCH 31 7 pg      MCHC 35 2 g/dL      RDW 14 0 %      MPV 10 3 fL      Platelets 182 Thousands/uL      Neutrophils Relative 67 %      Immat GRANS % 0 %      Lymphocytes Relative 22 %      Monocytes Relative 9 %      Eosinophils Relative 1 %      Basophils Relative 1 %      Neutrophils Absolute 7 39 Thousands/µL      Immature Grans Absolute 0 02 Thousand/uL      Lymphocytes Absolute 2 38 Thousands/µL      Monocytes Absolute 1 00 Thousand/µL      Eosinophils Absolute 0 09 Thousand/µL      Basophils Absolute 0 05 Thousands/µL                  US scrotum and testicles   Final Result by Benito Dejesus MD (06/18 1205)       Enlarged diffusely heterogeneous abnormal left testicle with infiltrated appearance in some areas which may show early cystic changes  Marked hypervascularity  Findings suspicious for neoplastic etiology  The study was marked in [facility] for immediate notification  Workstation performed: [id number]                    Procedures  Procedures         ED Course  ED Course as of Jun 18 1401 Fri Jun 18, 2021   1052 Patient given 4 mg IV morphine for pain relief      1052 Mildly elevated white count, not concerning   CBC and differential(!)   1055 Within normal limits   Comprehensive metabolic panel   2895 IMPRESSION:  Enlarged diffusely heterogeneous abnormal left testicle with infiltrated appearance in some areas which may show early cystic changes  Marked hypervascularity  Findings suspicious for neoplastic etiology     US scrotum and testicles SBIRT 22yo+      Most Recent Value   SBIRT (24 yo +)   In order to provide better care to our patients, we are screening all of our patients for alcohol and drug use  Would it be okay to ask you these screening questions? Yes Filed at: 06/18/2021 1013   Initial Alcohol Screen: US AUDIT-C    1  How often do you have a drink containing alcohol?  0 Filed at: 06/18/2021 1013   2  How many drinks containing alcohol do you have on a typical day you are drinking? 0 Filed at: 06/18/2021 1013   3a  Male UNDER 65: How often do you have five or more drinks on one occasion? 0 Filed at: 06/18/2021 1013   Audit-C Score  0 Filed at: 06/18/2021 1013   JEAN MARIE: How many times in the past year have you    Used an illegal drug or used a prescription medication for non-medical reasons? Never Filed at: 06/18/2021 1013                    MDM  Number of Diagnoses or Management Options  Pain in left testicle  Swelling of left testicle  Diagnosis management comments: Patient is a 5year-old male with no stated past medical history presents to the ED today with complaint of left testicular swelling and pain the past 2 weeks  Testicular exam showed a diffusely enlarged and tender left testicle; no erythema or ecchymosis, no signs of hernia; tenderness palpation of the left inguinal ligament with no swelling  Patient given 4 mg IV morphine for pain with moderate relief  Testicular ultrasound showed "Enlarged diffusely heterogeneous abnormal left testicle with infiltrated appearance in some areas which may show early cystic changes  Marked hypervascularity  Findings suspicious for neoplastic etiology  - Spoke with Urologist, Dr Rocío Nunez: recommended starting on 2 week course of antibiotics and having follow up with him in the next week    Pt given dose of IM ceftriaxone in the ED and d/c with 2 week supply of Doxycycline  Instructed to call Dr Rocío Nunez upon discharge schedule appointment for further evaluation  Follow-up with primary care as needed  Return to ED with worsening sinus symptoms as discussed with patient  Patient educated on strict diagnosis, the patient's questions were answered, patient has a puncture       Amount and/or Complexity of Data Reviewed  Clinical lab tests: ordered and reviewed  Tests in the radiology section of CPT®: ordered  Discuss the patient with other providers: yes    Patient Progress  Patient progress: stable      Disposition  Final diagnoses:   Pain in left testicle   Swelling of left testicle     Time reflects when diagnosis was documented in both MDM as applicable and the Disposition within this note     Time User Action Codes Description Comment    6/18/2021  1:33 PM Darlina Fitting Add [N50 812] Pain in left testicle     6/18/2021  1:35 PM Darlina Fitting Add [N50 89] Swelling of left testicle       ED Disposition     ED Disposition Condition Date/Time Comment    Discharge Stable Fri Jun 18, 2021  1:33 PM Sharlene Swanson discharge to home/self care              Follow-up Information     Follow up With Specialties Details Why Contact Info Additional Information    Iron Mariano MD Urology Schedule an appointment as soon as possible for a visit today for further evaluation [address] [facility] [address]       Em Oliver MD Internal Medicine  As needed [address] [facility] [address] [facility] Emergency Department Emergency Medicine  As needed, If symptoms worsen [address] [facility],[id number]  [address] [facility] Emergency Department, [address], [address] [place] [address] Rd          Discharge Medication List as of 6/18/2021  1:38 PM      START taking these medications    Details   doxycycline hyclate (VIBRAMYCIN) 100 mg capsule Take 1 capsule (100 mg total) by mouth 2 (two) times a day for 14 days, Starting Fri 6/18/2021, Until Fri 7/2/2021, Normal         CONTINUE these medications which have NOT CHANGED    Details   cyclobenzaprine (FLEXERIL) 10 mg tablet Take 1 tablet (10 mg total) by mouth 2 (two) times a day as needed for muscle spasms, Starting Sun 9/13/2020, Normal      naproxen (NAPROSYN) 500 mg tablet Take 1 tablet (500 mg total) by mouth 2 (two) times a day with meals, Starting Sun 9/13/2020, Normal           No discharge procedures on file      PDMP Review     None          ED Provider  Electronically Signed by           Layla Mcconnell PA-C  06/18/21 59 Page Ronnie Ivory PA-C  06/18/21 4028

## 2021-06-18 NOTE — ED ATTENDING ATTESTATION
6/18/2021  Israel Sun DO, saw and evaluated the patient  I have discussed the patient with the resident/non-physician practitioner and agree with the resident's/non-physician practitioner's findings, Plan of Care, and MDM as documented in the resident's/non-physician practitioner's note, except where noted  All available labs and Radiology studies were reviewed  I was present for key portions of any procedure(s) performed by the resident/non-physician practitioner and I was immediately available to provide assistance  At this point I agree with the current assessment done in the Emergency Department  I have conducted an independent evaluation of this patient a history and physical is as follows:  72-year-old male with testicular pain and swelling on left side  The acute onset and worsening over the last 3 weeks  On exam the patient has a significantly large tender testicle on the left  The patient will have an ultrasound and further evaluation by Urology    ED Course         Critical Care Time  Procedures

## 2021-06-18 NOTE — DISCHARGE INSTRUCTIONS
Begin Doxycycline 100 mg 2 times a day for 2 weeks  - be sure to cover skin or use sunscreen if outside  Continue over-the-counter Tylenol or ibuprofen every 6 hours as needed for pain  Call Dr Byron Garcia to schedule appointment for further evaluation  Follow-up with primary care as needed  Return to the ED with worsening signs and symptoms as mentioned in attached discharge paperwork

## 2021-06-18 NOTE — ED NOTES
Pt verbalized understanding of discharge instructions; ambulated out of the ED without assistance with spouse; uneventful ED visit     Aurelio Gaitan RN  06/18/21 7439

## 2022-07-19 ENCOUNTER — HOSPITAL ENCOUNTER (EMERGENCY)
Facility: HOSPITAL | Age: 37
Discharge: HOME | End: 2022-07-19
Attending: EMERGENCY MEDICINE | Admitting: EMERGENCY MEDICINE

## 2022-07-19 ENCOUNTER — APPOINTMENT (EMERGENCY)
Dept: CT IMAGING | Facility: HOSPITAL | Age: 37
End: 2022-07-19

## 2022-07-19 VITALS
OXYGEN SATURATION: 96 % | DIASTOLIC BLOOD PRESSURE: 94 MMHG | TEMPERATURE: 100 F | HEART RATE: 106 BPM | SYSTOLIC BLOOD PRESSURE: 145 MMHG | RESPIRATION RATE: 18 BRPM
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
## 2022-07-19 DIAGNOSIS — M79.2 NEUROPATHIC PAIN: Primary | ICD-10-CM

## 2022-07-19 LAB
ALBUMIN SERPL BCP-MCNC: 4.1 G/DL (ref 3.5–5)
ALP SERPL-CCNC: 61 U/L (ref 34–104)
ALT SERPL W P-5'-P-CCNC: 19 U/L (ref 7–52)
ANION GAP SERPL CALCULATED.3IONS-SCNC: 6 MMOL/L (ref 4–13)
AST SERPL W P-5'-P-CCNC: 25 U/L (ref 13–39)
BASOPHILS # BLD AUTO: 0.05 THOUSANDS/ΜL (ref 0–0.1)
BASOPHILS NFR BLD AUTO: 0 % (ref 0–1)
BILIRUB SERPL-MCNC: 0.48 MG/DL (ref 0.2–1)
BUN SERPL-MCNC: 13 MG/DL (ref 5–25)
CALCIUM SERPL-MCNC: 9 MG/DL (ref 8.4–10.2)
CHLORIDE SERPL-SCNC: 101 MMOL/L (ref 96–108)
CK SERPL-CCNC: 113 U/L (ref 39–308)
CO2 SERPL-SCNC: 27 MMOL/L (ref 21–32)
CREAT SERPL-MCNC: 0.97 MG/DL (ref 0.6–1.3)
EOSINOPHIL # BLD AUTO: 0.16 THOUSAND/ΜL (ref 0–0.61)
EOSINOPHIL NFR BLD AUTO: 1 % (ref 0–6)
ERYTHROCYTE [DISTWIDTH] IN BLOOD BY AUTOMATED COUNT: 13.9 % (ref 11.6–15.1)
FLUAV RNA RESP QL NAA+PROBE: NEGATIVE
FLUBV RNA RESP QL NAA+PROBE: NEGATIVE
GFR SERPL CREATININE-BSD FRML MDRD: 100 ML/MIN/1.73SQ M
GLUCOSE SERPL-MCNC: 92 MG/DL (ref 65–140)
HCT VFR BLD AUTO: 43.3 % (ref 36.5–49.3)
HGB BLD-MCNC: 14.9 G/DL (ref 12–17)
IMM GRANULOCYTES # BLD AUTO: 0.06 THOUSAND/UL (ref 0–0.2)
IMM GRANULOCYTES NFR BLD AUTO: 0 % (ref 0–2)
LYMPHOCYTES # BLD AUTO: 3.37 THOUSANDS/ΜL (ref 0.6–4.47)
LYMPHOCYTES NFR BLD AUTO: 24 % (ref 14–44)
MAGNESIUM SERPL-MCNC: 2.6 MG/DL (ref 1.9–2.7)
MCH RBC QN AUTO: 31.7 PG (ref 26.8–34.3)
MCHC RBC AUTO-ENTMCNC: 34.4 G/DL (ref 31.4–37.4)
MCV RBC AUTO: 92 FL (ref 82–98)
MONOCYTES # BLD AUTO: 0.78 THOUSAND/ΜL (ref 0.17–1.22)
MONOCYTES NFR BLD AUTO: 6 % (ref 4–12)
NEUTROPHILS # BLD AUTO: 9.45 THOUSANDS/ΜL (ref 1.85–7.62)
NEUTS SEG NFR BLD AUTO: 69 % (ref 43–75)
NRBC BLD AUTO-RTO: 0 /100 WBCS
PLATELET # BLD AUTO: 228 THOUSANDS/UL (ref 149–390)
PMV BLD AUTO: 9.7 FL (ref 8.9–12.7)
POTASSIUM SERPL-SCNC: 4.4 MMOL/L (ref 3.5–5.3)
PROT SERPL-MCNC: 7.4 G/DL (ref 6.4–8.4)
RBC # BLD AUTO: 4.7 MILLION/UL (ref 3.88–5.62)
RSV RNA RESP QL NAA+PROBE: NEGATIVE
SARS-COV-2 RNA RESP QL NAA+PROBE: NEGATIVE
SODIUM SERPL-SCNC: 134 MMOL/L (ref 135–147)
TSH SERPL DL<=0.05 MIU/L-ACNC: 3.53 UIU/ML (ref 0.45–4.5)
WBC # BLD AUTO: 13.87 THOUSAND/UL (ref 4.31–10.16)

## 2022-07-19 PROCEDURE — 84443 ASSAY THYROID STIM HORMONE: CPT

## 2022-07-19 PROCEDURE — 85025 COMPLETE CBC W/AUTO DIFF WBC: CPT

## 2022-07-19 PROCEDURE — 83735 ASSAY OF MAGNESIUM: CPT

## 2022-07-19 PROCEDURE — 36415 COLL VENOUS BLD VENIPUNCTURE: CPT

## 2022-07-19 PROCEDURE — 0241U HB NFCT DS VIR RESP RNA 4 TRGT: CPT

## 2022-07-19 PROCEDURE — 86753 PROTOZOA ANTIBODY NOS: CPT

## 2022-07-19 PROCEDURE — 80053 COMPREHEN METABOLIC PANEL: CPT

## 2022-07-19 PROCEDURE — 82746 ASSAY OF FOLIC ACID SERUM: CPT

## 2022-07-19 PROCEDURE — 82550 ASSAY OF CK (CPK): CPT

## 2022-07-19 PROCEDURE — 70450 CT HEAD/BRAIN W/O DYE: CPT

## 2022-07-19 PROCEDURE — 99284 EMERGENCY DEPT VISIT MOD MDM: CPT

## 2022-07-19 PROCEDURE — 99285 EMERGENCY DEPT VISIT HI MDM: CPT | Performed by: EMERGENCY MEDICINE

## 2022-07-19 PROCEDURE — 82607 VITAMIN B-12: CPT

## 2022-07-19 PROCEDURE — G1004 CDSM NDSC: HCPCS

## 2022-07-19 PROCEDURE — 86617 LYME DISEASE ANTIBODY: CPT

## 2022-07-19 PROCEDURE — 86618 LYME DISEASE ANTIBODY: CPT

## 2022-07-19 RX ORDER — DOXYCYCLINE HYCLATE 100 MG/1
100 CAPSULE ORAL 2 TIMES DAILY
Qty: 20 CAPSULE | Refills: 0 | Status: SHIPPED | OUTPATIENT
Start: 2022-07-19 | End: 2022-07-29

## 2022-07-19 RX ORDER — GABAPENTIN 300 MG/1
300 CAPSULE ORAL DAILY
Qty: 7 CAPSULE | Refills: 0 | Status: SHIPPED | OUTPATIENT
Start: 2022-07-19 | End: 2022-07-26

## 2022-07-19 RX ORDER — KETOROLAC TROMETHAMINE 10 MG/1
10 TABLET, FILM COATED ORAL ONCE
Status: COMPLETED | OUTPATIENT
Start: 2022-07-19 | End: 2022-07-19

## 2022-07-19 RX ORDER — GABAPENTIN 100 MG/1
100 CAPSULE ORAL ONCE
Status: COMPLETED | OUTPATIENT
Start: 2022-07-19 | End: 2022-07-19

## 2022-07-19 RX ORDER — GABAPENTIN 300 MG/1
300 CAPSULE ORAL 3 TIMES DAILY
Qty: 30 CAPSULE | Refills: 0 | Status: SHIPPED | OUTPATIENT
Start: 2022-07-19

## 2022-07-19 RX ADMIN — DICLOFENAC SODIUM TOPICAL GEL, 1%, 2 G: 10 GEL TOPICAL at 22:11

## 2022-07-19 RX ADMIN — KETOROLAC TROMETHAMINE 10 MG: 10 TABLET, FILM COATED ORAL at 22:11

## 2022-07-19 RX ADMIN — GABAPENTIN 100 MG: 100 CAPSULE ORAL at 22:11

## 2022-07-19 NOTE — ED PROVIDER NOTES
History  Chief Complaint   Patient presents with    Tingling     Pt to ED with c/o tingling for 3 weeks, pt states "I think that I have lyme disease" pt unsure if bit by a tick     74-year-old male with no significant PMH presents with 2 5 weeks of increased arthralgias and paresthesias  Patient states the pain started a few days after cutting long grass, he did notice baby dear in the area  He has no recollection of being bit by any bugs, namely tics  He states the pain is constant and has been worsening  The most affected areas are the LLE, 2nd and 3rd digits billaterally, left SCM, and left forearm  History provided by:  Patient and significant other   used: No    Neurologic Problem  Location:  Left lower leg, b/l 2nd and 3rd digits, left forearm  Quality:  Numbness and tingling  Severity:  Moderate  Onset quality:  Gradual  Duration:  3 weeks  Timing:  Constant  Progression:  Worsening  Chronicity:  New  Context:  Started a few days after cutting long grass  Relieved by:  Nothing  Worsened by: Movement  Ineffective treatments:  NSAIDs, tylenol, Flexeril  Associated symptoms: fatigue, fever, myalgias, nausea and vomiting    Associated symptoms: no abdominal pain, no chest pain, no congestion, no cough, no diarrhea, no ear pain, no headaches, no rash, no rhinorrhea and no shortness of breath    Vomiting:     Quality:  Stomach contents    Number of occurrences:  4    Severity:  Moderate    Duration:  3 weeks    Timing:  Sporadic    Progression:  Unchanged      Prior to Admission Medications   Prescriptions Last Dose Informant Patient Reported? Taking?    cyclobenzaprine (FLEXERIL) 10 mg tablet   No No   Sig: Take 1 tablet (10 mg total) by mouth 2 (two) times a day as needed for muscle spasms   Patient not taking: Reported on 6/18/2021   naproxen (NAPROSYN) 500 mg tablet   No No   Sig: Take 1 tablet (500 mg total) by mouth 2 (two) times a day with meals   Patient not taking: Reported on 6/18/2021      Facility-Administered Medications: None       Past Medical History:   Diagnosis Date    Depression     Psychiatric disorder     bipolar       History reviewed  No pertinent surgical history  History reviewed  No pertinent family history  I have reviewed and agree with the history as documented  E-Cigarette/Vaping    E-Cigarette Use Never User      E-Cigarette/Vaping Substances     Social History     Tobacco Use    Smoking status: Current Every Day Smoker     Packs/day: 1 00     Types: Cigarettes    Smokeless tobacco: Never Used   Vaping Use    Vaping Use: Never used   Substance Use Topics    Alcohol use: Never     Comment: 2 cases of beer daily     Drug use: Yes     Types: Marijuana        Review of Systems   Constitutional: Positive for activity change, chills, diaphoresis, fatigue and fever  HENT: Negative for congestion, ear pain, hearing loss and rhinorrhea  Eyes: Negative for visual disturbance  Respiratory: Negative for cough and shortness of breath  Cardiovascular: Negative for chest pain and palpitations  Gastrointestinal: Positive for nausea and vomiting  Negative for abdominal distention, abdominal pain, blood in stool, constipation and diarrhea  Genitourinary: Negative for difficulty urinating, dysuria and hematuria  Musculoskeletal: Positive for myalgias and neck pain (Right SCM)  Negative for arthralgias  Skin: Negative for color change, rash and wound  Neurological: Positive for dizziness and light-headedness  Negative for seizures, facial asymmetry, speech difficulty, weakness, numbness and headaches  Psychiatric/Behavioral: Negative for confusion  All other systems reviewed and are negative        Physical Exam  ED Triage Vitals [07/19/22 1811]   Temperature Pulse Respirations Blood Pressure SpO2   100 °F (37 8 °C) (!) 106 18 145/94 96 %      Temp Source Heart Rate Source Patient Position - Orthostatic VS BP Location FiO2 (%)   Oral Monitor Sitting Left arm --      Pain Score       --             Orthostatic Vital Signs  Vitals:    07/19/22 1811   BP: 145/94   Pulse: (!) 106   Patient Position - Orthostatic VS: Sitting       Physical Exam  Vitals and nursing note reviewed  Constitutional:       General: He is in acute distress  Appearance: Normal appearance  He is well-developed  HENT:      Head: Normocephalic and atraumatic  Right Ear: External ear normal       Left Ear: External ear normal       Nose: Nose normal       Mouth/Throat:      Mouth: Mucous membranes are moist    Eyes:      Conjunctiva/sclera: Conjunctivae normal    Cardiovascular:      Rate and Rhythm: Regular rhythm  Tachycardia present  Pulses: Normal pulses  Heart sounds: No murmur heard  Pulmonary:      Effort: Pulmonary effort is normal  No respiratory distress  Breath sounds: Normal breath sounds  Abdominal:      Tenderness: There is abdominal tenderness  There is guarding  Hernia: A hernia (umbilical) is present  Musculoskeletal:         General: No deformity or signs of injury  Tenderness:  right SCM  Cervical back: Neck supple  Tenderness ( right SCM) present  Right lower leg: No edema  Left lower leg: No edema  Lymphadenopathy:      Cervical: No cervical adenopathy  Skin:     General: Skin is warm and dry  Neurological:      Mental Status: He is alert and oriented to person, place, and time  Cranial Nerves: No cranial nerve deficit  Sensory: Sensory deficit (LLE decreased sensation, bilateral 2nd and 3rd digits parasthesias, L forearm decreased sensation) present  Motor: No weakness        Deep Tendon Reflexes: Reflexes normal    Psychiatric:         Mood and Affect: Mood normal          Behavior: Behavior normal          ED Medications  Medications   Diclofenac Sodium (VOLTAREN) 1 % topical gel 2 g (2 g Topical Given 7/19/22 2211)   gabapentin (NEURONTIN) capsule 100 mg (100 mg Oral Given 7/19/22 2211) ketorolac (TORADOL) tablet 10 mg (10 mg Oral Given 7/19/22 2211)       Diagnostic Studies  Results Reviewed     Procedure Component Value Units Date/Time    FLU/RSV/COVID - if FLU/RSV clinically relevant [[id number]]  (Normal) Collected: 07/19/22 2113    Lab Status: Final result Specimen: Nares from Nose Updated: 07/19/22 2207     SARS-CoV-2 Negative     INFLUENZA A PCR Negative     INFLUENZA B PCR Negative     RSV PCR Negative    Narrative:      FOR PEDIATRIC PATIENTS - copy/paste COVID Guidelines URL to browser: [email or web address]  WhoSayx    SARS-CoV-2 assay is a Nucleic Acid Amplification assay intended for the  qualitative detection of nucleic acid from SARS-CoV-2 in nasopharyngeal  swabs  Results are for the presumptive identification of SARS-CoV-2 RNA  Positive results are indicative of infection with SARS-CoV-2, the virus  causing COVID-19, but do not rule out bacterial infection or co-infection  with other viruses  Laboratories within the [place] and its  territories are required to report all positive results to the appropriate  public health authorities  Negative results do not preclude SARS-CoV-2  infection and should not be used as the sole basis for treatment or other  patient management decisions  Negative results must be combined with  clinical observations, patient history, and epidemiological information  This test has not been FDA cleared or approved  This test has been authorized by FDA under an Emergency Use Authorization  (EUA)  This test is only authorized for the duration of time the  declaration that circumstances exist justifying the authorization of the  emergency use of an in vitro diagnostic tests for detection of SARS-CoV-2  virus and/or diagnosis of COVID-19 infection under section 564(b)(1) of  the Act, 21 U  S C  705TIV-8(Q)(5), unless the authorization is terminated  or revoked sooner   The test has been validated but independent review by FDA  and CLIA is pending  Test performed using [employer] GeneXpert: This RT-PCR assay targets N2,  a region unique to SARS-CoV-2  A conserved region in the E-gene was chosen  for pan-Sarbecovirus detection which includes SARS-CoV-2  TSH [[id number]]  (Normal) Collected: 07/19/22 2113    Lab Status: Final result Specimen: Blood from Arm, Right Updated: 07/19/22 2158     TSH 3RD GENERATON 3 528 uIU/mL     Narrative:      Patients undergoing fluorescein dye angiography may retain small amounts of fluorescein in the body for 48-72 hours post procedure  Samples containing fluorescein can produce falsely depressed TSH values  If the patient had this procedure,a specimen should be resubmitted post fluorescein clearance        CK [[id number]]  (Normal) Collected: 07/19/22 2113    Lab Status: Final result Specimen: Blood from Arm, Right Updated: 07/19/22 2155     Total  U/L     Comprehensive metabolic panel [[id number]]  (Abnormal) Collected: 07/19/22 2113    Lab Status: Final result Specimen: Blood from Arm, Right Updated: 07/19/22 2155     Sodium 134 mmol/L      Potassium 4 4 mmol/L      Chloride 101 mmol/L      CO2 27 mmol/L      ANION GAP 6 mmol/L      BUN 13 mg/dL      Creatinine 0 97 mg/dL      Glucose 92 mg/dL      Calcium 9 0 mg/dL      AST 25 U/L      ALT 19 U/L      Alkaline Phosphatase 61 U/L      Total Protein 7 4 g/dL      Albumin 4 1 g/dL      Total Bilirubin 0 48 mg/dL      eGFR 100 ml/min/1 73sq m     Narrative:      National Kidney Disease Foundation guidelines for Chronic Kidney Disease (CKD):     Stage 1 with normal or high GFR (GFR > 90 mL/min/1 73 square meters)    Stage 2 Mild CKD (GFR = 60-89 mL/min/1 73 square meters)    Stage 3A Moderate CKD (GFR = 45-59 mL/min/1 73 square meters)    Stage 3B Moderate CKD (GFR = 30-44 mL/min/1 73 square meters)    Stage 4 Severe CKD (GFR = 15-29 mL/min/1 73 square meters)    Stage 5 End Stage CKD (GFR <15 mL/min/1 73 square meters)  Note: GFR calculation is accurate only with a steady state creatinine    Magnesium [[id number]]  (Normal) Collected: 07/19/22 2113    Lab Status: Final result Specimen: Blood from Arm, Right Updated: 07/19/22 2155     Magnesium 2 6 mg/dL     CBC and differential [[id number]]  (Abnormal) Collected: 07/19/22 2113    Lab Status: Final result Specimen: Blood from Arm, Right Updated: 07/19/22 2126     WBC 13 87 Thousand/uL      RBC 4 70 Million/uL      Hemoglobin 14 9 g/dL      Hematocrit 43 3 %      MCV 92 fL      MCH 31 7 pg      MCHC 34 4 g/dL      RDW 13 9 %      MPV 9 7 fL      Platelets 370 Thousands/uL      nRBC 0 /100 WBCs      Neutrophils Relative 69 %      Immat GRANS % 0 %      Lymphocytes Relative 24 %      Monocytes Relative 6 %      Eosinophils Relative 1 %      Basophils Relative 0 %      Neutrophils Absolute 9 45 Thousands/µL      Immature Grans Absolute 0 06 Thousand/uL      Lymphocytes Absolute 3 37 Thousands/µL      Monocytes Absolute 0 78 Thousand/µL      Eosinophils Absolute 0 16 Thousand/µL      Basophils Absolute 0 05 Thousands/µL     Babesia microti antibody, IgG & Igm [[id number]] Collected: 07/19/22 2113    Lab Status: In process Specimen: Blood from Arm, Right Updated: 07/19/22 2121    Lyme Total Antibody Profile with reflex to WB [[id number]] Collected: 07/19/22 2113    Lab Status: In process Specimen: Blood from Arm, Right Updated: 07/19/22 2121    Vitamin B12 [[id number]] Collected: 07/19/22 2113    Lab Status: In process Specimen: Blood from Arm, Right Updated: 07/19/22 2121    Folate [[id number]] Collected: 07/19/22 2113    Lab Status:  In process Specimen: Blood from Arm, Right Updated: 07/19/22 2121    Anaplasma Phagocytophilum, PCR [[id number]]     Lab Status: No result Specimen: Blood     Chlamydia/GC amplified DNA by PCR [[id number]]     Lab Status: No result                  CT head without contrast   Final Result by Tre Zee DO (07/19 2103)      No acute intracranial abnormality  If patient has persistent or worsening symptoms, MRI of brain should be considered  Workstation performed: [id number]               Procedures  Procedures      ED Course  ED Course as of 07/19/22 2236   e Jul 19, 2022 2106 CT HEAD IMPRESSION:     No acute intracranial abnormality  2157 CBC and differential(!)  Slightly elevated white count   2157 Comprehensive metabolic panel(!)  WNL   4008 Magnesium  WNL   2158 CK  WNL   2159 TSH  WNL   2207 FLU/RSV/COVID - if FLU/RSV clinically relevant   2235 Pt will be discharged while waiting for results, given Doxycycline for 10 days and Gabapentin for 7 days  He will have close follow up with his PCP in order to tailor the medications and go over the results of pending labs  Pt understands and agrees with this plan  MDM  Number of Diagnoses or Management Options  Diagnosis management comments: Differential diagnosis including but not limited to: Gout, Lyme disease, Lupus, metabolic abnormality, peripheral neuropathy, thyroid disease, substance abuse, adverse reaction, cardiac etiology          Amount and/or Complexity of Data Reviewed  Clinical lab tests: ordered  Tests in the radiology section of CPT®: reviewed  Tests in the medicine section of CPT®: ordered  Decide to obtain previous medical records or to obtain history from someone other than the patient: yes  Review and summarize past medical records: yes  Independent visualization of images, tracings, or specimens: yes    Risk of Complications, Morbidity, and/or Mortality  Presenting problems: high  Diagnostic procedures: moderate  Management options: moderate    Patient Progress  Patient progress: stable      Disposition  Final diagnoses:   Neuropathic pain   Tick bite     Time reflects when diagnosis was documented in both MDM as applicable and the Disposition within this note     Time User Action Codes Description Comment    7/19/2022 10:29 PM Christopher Ngo Add [M79 2] Neuropathic pain     7/19/2022 10:29 PM Janis Cody Add Kathie Flores  XXXA] Tick bite       ED Disposition     ED Disposition   Discharge    Condition   Stable    Date/Time   Tue Jul 19, 2022 10:18 PM    Efrem Collins [facility] discharge to home/self care  Follow-up Information     Follow up With Specialties Details Why Marley Dietz MD Internal Medicine In 1 week For ER follow up and to discuss medication plan  Or if symptoms worsen [facility] [address]  [phone]            Discharge Medication List as of 7/19/2022 10:30 PM      START taking these medications    Details   doxycycline hyclate (VIBRAMYCIN) 100 mg capsule Take 1 capsule (100 mg total) by mouth 2 (two) times a day for 10 days, Starting Tue 7/19/2022, Until Fri 7/29/2022, Normal      gabapentin (NEURONTIN) 300 mg capsule Take 1 capsule (300 mg total) by mouth daily for 7 days one capsule on day 1 and then one capsule twice a day on day 2 and then 3 times a day on day 3 and beyond, Starting Tue 7/19/2022, Until Tue 7/26/2022, Print         CONTINUE these medications which have NOT CHANGED    Details   cyclobenzaprine (FLEXERIL) 10 mg tablet Take 1 tablet (10 mg total) by mouth 2 (two) times a day as needed for muscle spasms, Starting Sun 9/13/2020, Normal      naproxen (NAPROSYN) 500 mg tablet Take 1 tablet (500 mg total) by mouth 2 (two) times a day with meals, Starting Sun 9/13/2020, Normal           No discharge procedures on file  PDMP Review     None           ED Provider  Attending physically available and evaluated Oliviadanny Sunitha MITCHELL managed the patient along with the ED Attending      Electronically Signed by         Emelina Lipscomb DO  07/19/22 3509

## 2022-07-19 NOTE — ED ATTENDING ATTESTATION
7/19/2022  I, Raymond Granda MD, saw and evaluated the patient  I have discussed the patient with the resident/non-physician practitioner and agree with the resident's/non-physician practitioner's findings, Plan of Care, and MDM as documented in the resident's/non-physician practitioner's note, except where noted  All available labs and Radiology studies were reviewed  I was present for key portions of any procedure(s) performed by the resident/non-physician practitioner and I was immediately available to provide assistance  At this point I agree with the current assessment done in the Emergency Department  I have conducted an independent evaluation of this patient a history and physical is as follows:    37-year-old previously healthy male presented for evaluation of about 2-3 weeks of bilateral knee pain, paresthesias of the upper and lower extremities  Reports painful paresthesias most significant in the right hand 2nd and 3rd digits  States that his symptoms have been so significant that he is needed to skip work many days the last 2 weeks  Works in [address] [employer] [facility] prior to development of symptoms he was working in [employer]  Denies any known tick bites or other insect exposures  No rashes  He has been having subjective fever but never took his temperature at home  Temperature a 100° on arrival here  Mild tachycardia  Appears uncomfortable  Mostly secondary to bilateral knee pain and right hand pain  Worse with movement  No effusions or color changes  Pulses normal   Diminished light touch sensation in the left forearm, hand, left lower extremity  Differential diagnosis includes Lyme disease, babesiosis, Anaplasmosis, new onset MS, CVA, disseminated GC  Plan CT head, labs, urine, re-evaluate  ED Course     Workup unremarkable thus far  Stable for discharge home, outpatient follow-up  Treating with empiric doxycycline for suspected tick-borne illness    Return if symptoms worsen      Critical Care Time  Procedures

## 2022-07-20 LAB
B BURGDOR IGG+IGM SER-ACNC: 4.9 AI
FOLATE SERPL-MCNC: 10.1 NG/ML (ref 3.1–17.5)
VIT B12 SERPL-MCNC: 675 PG/ML (ref 100–900)

## 2022-07-20 NOTE — DISCHARGE INSTRUCTIONS
Return sooner to the Emergency Department if pain is not resolving or symptoms are worsening  Follow up closely with your PCP in order to determine best course of action for medications that are prescribed

## 2022-07-21 LAB
B BURGDOR IGG PATRN SER IB-IMP: NEGATIVE
B BURGDOR IGM PATRN SER IB-IMP: NEGATIVE
B BURGDOR18KD IGG SER QL IB: ABNORMAL
B BURGDOR23KD IGG SER QL IB: ABNORMAL
B BURGDOR23KD IGM SER QL IB: PRESENT
B BURGDOR28KD IGG SER QL IB: ABNORMAL
B BURGDOR30KD IGG SER QL IB: ABNORMAL
B BURGDOR39KD IGG SER QL IB: ABNORMAL
B BURGDOR39KD IGM SER QL IB: ABNORMAL
B BURGDOR41KD IGG SER QL IB: ABNORMAL
B BURGDOR41KD IGM SER QL IB: ABNORMAL
B BURGDOR45KD IGG SER QL IB: ABNORMAL
B BURGDOR58KD IGG SER QL IB: ABNORMAL
B BURGDOR66KD IGG SER QL IB: ABNORMAL
B BURGDOR93KD IGG SER QL IB: ABNORMAL

## 2022-07-22 LAB
B MICROTI IGG TITR SER: NORMAL {TITER}
B MICROTI IGM TITR SER: NORMAL {TITER}

## 2022-08-13 ENCOUNTER — APPOINTMENT (EMERGENCY)
Dept: RADIOLOGY | Facility: HOSPITAL | Age: 37
End: 2022-08-13

## 2022-08-13 ENCOUNTER — HOSPITAL ENCOUNTER (EMERGENCY)
Facility: HOSPITAL | Age: 37
Discharge: HOME | End: 2022-08-13
Attending: INTERNAL MEDICINE

## 2022-08-13 VITALS
OXYGEN SATURATION: 98 % | WEIGHT: 229.28 LBS | RESPIRATION RATE: 16 BRPM | HEIGHT: 76 IN | BODY MASS INDEX: 27.92 KG/M2 | TEMPERATURE: 98.1 F | DIASTOLIC BLOOD PRESSURE: 111 MMHG | SYSTOLIC BLOOD PRESSURE: 151 MMHG | HEART RATE: 105 BPM

## 2022-08-13 DIAGNOSIS — M79.644 FINGER PAIN, RIGHT: Primary | ICD-10-CM

## 2022-08-13 PROCEDURE — 73140 X-RAY EXAM OF FINGER(S): CPT

## 2022-08-13 PROCEDURE — 99283 EMERGENCY DEPT VISIT LOW MDM: CPT

## 2022-08-13 PROCEDURE — 99284 EMERGENCY DEPT VISIT MOD MDM: CPT | Performed by: PHYSICIAN ASSISTANT

## 2022-08-13 RX ORDER — OXYCODONE HYDROCHLORIDE AND ACETAMINOPHEN 5; 325 MG/1; MG/1
1 TABLET ORAL ONCE
Status: COMPLETED | OUTPATIENT
Start: 2022-08-13 | End: 2022-08-13

## 2022-08-13 RX ORDER — IBUPROFEN 600 MG/1
600 TABLET ORAL ONCE
Status: COMPLETED | OUTPATIENT
Start: 2022-08-13 | End: 2022-08-13

## 2022-08-13 RX ADMIN — OXYCODONE HYDROCHLORIDE AND ACETAMINOPHEN 1 TABLET: 5; 325 TABLET ORAL at 16:34

## 2022-08-13 RX ADMIN — IBUPROFEN 600 MG: 600 TABLET, FILM COATED ORAL at 16:35

## 2022-08-13 NOTE — DISCHARGE INSTRUCTIONS
Use Splint for next few days for comfort, taking off to bathe or sleep or until follow-up  Use Tylenol 650 mg every 4 hours or Anti-inflammatories like Advil, Motrin, Ibuprofen, Aleve every 6 hours; you can alternate the 2 medications taking something every 3 hours for pain  Elevate and ice to area to help with symptoms  Follow-up with your PCP or hand surgeon in the next few days if no improvement in condition

## 2022-08-13 NOTE — ED TRIAGE NOTES
[address] w/complaint of right 2nd digit injury; states he was pulling buckets apart at work x1 month ago and finger has hurt since; nothing OTC for pain; states "pain is so bad at night I can't sleep"'  BP in triage = 159/111 & 154/103 - no history of HTN

## 2022-08-13 NOTE — ED PROVIDER NOTES
History  Chief Complaint   Patient presents with    Finger Pain     [address] w/complaint of right 2nd digit injury; states he was pulling buckets apart at work x1 month ago and finger has hurt since; nothing OTC for pain; states "pain is so bad at night I can't sleep"'  BP in triage = 159/111 & 154/103 - no history of HTN     PMH: Depression, Bipolar disorder  No PSH  Presents to ED c/o persistent, worsening right index finger pain  Pt reports remote injury about 1 month ago when he was , pulling buckets apart at work, another person pulled too hard and index/middle finger nail tips got pulled up, that was healing; then also recently told he has Lyme disease (although when I reviewed labs Lyme test from 7/19/2022 was negative), but treated with Doxy and Gabapentin for similar sx, now over the past few days has had increased pain to finger, and pt states feels swollen  Pt has not taken any PO medication for pain, "just puts cream on it"  Prior to Admission Medications   Prescriptions Last Dose Informant Patient Reported? Taking? cyclobenzaprine (FLEXERIL) 10 mg tablet   No No   Sig: Take 1 tablet (10 mg total) by mouth 2 (two) times a day as needed for muscle spasms   Patient not taking: Reported on 6/18/2021   gabapentin (NEURONTIN) 300 mg capsule   No No   Sig: Take 1 capsule (300 mg total) by mouth daily for 7 days one capsule on day 1 and then one capsule twice a day on day 2 and then 3 times a day on day 3 and beyond   gabapentin (Neurontin) 300 mg capsule   No No   Sig: Take 1 capsule (300 mg total) by mouth 3 (three) times a day Take 1 capsule on day 1,  Then take 2 capsules on day 2, Then take 3 capsules on day 3 and every day after that as instructed by your doctor     naproxen (NAPROSYN) 500 mg tablet   No No   Sig: Take 1 tablet (500 mg total) by mouth 2 (two) times a day with meals   Patient not taking: Reported on 6/18/2021      Facility-Administered Medications: None       Past Medical History:   Diagnosis Date    Depression     Psychiatric disorder     bipolar       History reviewed  No pertinent surgical history  History reviewed  No pertinent family history  I have reviewed and agree with the history as documented  E-Cigarette/Vaping    E-Cigarette Use Never User      E-Cigarette/Vaping Substances     Social History     Tobacco Use    Smoking status: Current Every Day Smoker     Packs/day: 1 00     Types: Cigarettes    Smokeless tobacco: Never Used   Vaping Use    Vaping Use: Never used   Substance Use Topics    Alcohol use: Never     Comment: 2 cases of beer daily     Drug use: Yes     Types: Marijuana       Review of Systems   Constitutional: Negative for chills and fever  HENT: Negative for hearing loss and sore throat  Respiratory: Negative for shortness of breath  Cardiovascular: Negative for chest pain  Gastrointestinal: Negative for abdominal pain, diarrhea and vomiting  Musculoskeletal: Positive for arthralgias, joint swelling and myalgias  Skin: Negative for pallor and rash  Neurological: Negative for weakness and numbness  Psychiatric/Behavioral: Negative for behavioral problems  All other systems reviewed and are negative  Physical Exam  Physical Exam  Vitals and nursing note reviewed  Constitutional:       Appearance: Normal appearance  He is well-developed  HENT:      Head: Normocephalic and atraumatic  Nose: Nose normal       Mouth/Throat:      Mouth: Mucous membranes are moist       Pharynx: Oropharynx is clear  Eyes:      Conjunctiva/sclera: Conjunctivae normal    Cardiovascular:      Rate and Rhythm: Normal rate  Pulmonary:      Effort: Pulmonary effort is normal  No respiratory distress  Musculoskeletal:         General: Swelling and tenderness present  No deformity  Cervical back: Normal range of motion        Comments: Mild diffuse tenderness noted to mid-distal right index finger, subtle swelling to PIPJ, slight decreased ROM to full flexion due to pain, no skin changes, no signs of infection, skin intact, distal NV intact  No other pain along fingers, hand, snuff box  Skin:     General: Skin is warm and dry  Capillary Refill: Capillary refill takes less than 2 seconds  Findings: No erythema or rash  Neurological:      Mental Status: He is alert  Psychiatric:         Behavior: Behavior normal          Vital Signs  ED Triage Vitals [08/13/22 1616]   Temperature Pulse Respirations Blood Pressure SpO2   98 1 °F (36 7 °C) 105 16 (!) 151/111 98 %      Temp Source Heart Rate Source Patient Position - Orthostatic VS BP Location FiO2 (%)   Oral Monitor Sitting Left arm --      Pain Score       10 - Worst Possible Pain           Vitals:    08/13/22 1616   BP: (!) 151/111   Pulse: 105   Patient Position - Orthostatic VS: Sitting         Visual Acuity      ED Medications  Medications   ibuprofen (MOTRIN) tablet 600 mg (600 mg Oral Given 8/13/22 1635)   oxyCODONE-acetaminophen (PERCOCET) 5-325 mg per tablet 1 tablet (1 tablet Oral Given 8/13/22 1634)       Diagnostic Studies  Results Reviewed     None                 XR finger second digit-index RIGHT   ED Interpretation by Mitali Moon PA-C (08/13 1645)   No fx                 Procedures  Splint application    Date/Time: 8/13/2022 4:58 PM  Performed by: Mitali Moon PA-C  Authorized by: Mitali Moon PA-C   Universal Protocol:  Procedure performed by: (splint placed by tech, checked by me)  Consent: Verbal consent obtained  Risks and benefits: risks, benefits and alternatives were discussed  Consent given by: patient  Time out: Immediately prior to procedure a "time out" was called to verify the correct patient, procedure, equipment, support staff and site/side marked as required    Patient understanding: patient states understanding of the procedure being performed  Patient identity confirmed: verbally with patient      Pre-procedure details: Sensation:  Normal  Procedure details:     Laterality:  Right    Location:  Finger    Finger:  R index finger    Splint type:  Finger splint, static    Supplies:  Aluminum splint (cling gauze )  Post-procedure details:     Pain:  Improved    Sensation:  Normal    Patient tolerance of procedure: Tolerated well, no immediate complications  Comments:      Pt states sx feel better with splint             ED Course                               SBIRT 20yo+    Flowsheet Row Most Recent Value   SBIRT (25 yo +)    In order to provide better care to our patients, we are screening all of our patients for alcohol and drug use  Would it be okay to ask you these screening questions? Yes Filed at: 08/13/2022 1652   Initial Alcohol Screen: US AUDIT-C     1  How often do you have a drink containing alcohol? 0 Filed at: 08/13/2022 1652   2  How many drinks containing alcohol do you have on a typical day you are drinking? 0 Filed at: 08/13/2022 1652   3a  Male UNDER 65: How often do you have five or more drinks on one occasion? 0 Filed at: 08/13/2022 1652   3b  FEMALE Any Age, or MALE 65+: How often do you have 4 or more drinks on one occassion? 0 Filed at: 08/13/2022 1652   Audit-C Score 0 Filed at: 08/13/2022 1652   JEAN MARIE: How many times in the past year have you    Used an illegal drug or used a prescription medication for non-medical reasons? Never Filed at: 08/13/2022 1652                    MDM  Number of Diagnoses or Management Options  Finger pain, right  Pain in other joint  Diagnosis management comments:  The patient aware of negative Lyme test results, negative x-rays, to follow-up with hand surgeon as needed symptoms persist       Amount and/or Complexity of Data Reviewed  Tests in the radiology section of CPT®: ordered and reviewed  Review and summarize past medical records: yes  Discuss the patient with other providers: yes        Disposition  Final diagnoses:   Finger pain, right     Time reflects when diagnosis was documented in both MDM as applicable and the Disposition within this note     Time User Action Codes Description Comment    8/13/2022  4:47 PM Ab Guillen Add [Q86 828] Finger pain, right     8/13/2022  4:47 PM Ab Guillen Add [M25 59] Pain in other joint     8/13/2022  4:59 PM Ab Guillen Remove [M25 59] Pain in other joint       ED Disposition     ED Disposition   Discharge    Condition   Stable    Date/Time   Sat Aug 13, 2022  4:47 PM    Comment   [facility] discharge to home/self care  Follow-up Information     Follow up With Specialties Details Why [address], MD Orthopedic Surgery, Hand Surgery   [place]  [phone]            Patient's Medications   Discharge Prescriptions    No medications on file       No discharge procedures on file      PDMP Review     None          ED Provider  Electronically Signed by           Justin Carter PA-C  08/13/22 6417

## 2022-10-19 ENCOUNTER — HOSPITAL ENCOUNTER (EMERGENCY)
Facility: HOSPITAL | Age: 37
Discharge: HOME | End: 2022-10-19
Attending: EMERGENCY MEDICINE

## 2022-10-19 VITALS
OXYGEN SATURATION: 98 % | WEIGHT: 263.01 LBS | HEART RATE: 99 BPM | DIASTOLIC BLOOD PRESSURE: 111 MMHG | TEMPERATURE: 97.5 F | RESPIRATION RATE: 16 BRPM | SYSTOLIC BLOOD PRESSURE: 162 MMHG | BODY MASS INDEX: 32.03 KG/M2 | HEIGHT: 76 IN

## 2022-10-19 DIAGNOSIS — H61.22 IMPACTED CERUMEN OF LEFT EAR: Primary | ICD-10-CM

## 2022-10-19 DIAGNOSIS — H91.90 HEARING LOSS: ICD-10-CM

## 2022-10-19 PROCEDURE — 99282 EMERGENCY DEPT VISIT SF MDM: CPT

## 2022-10-19 PROCEDURE — 69209 REMOVE IMPACTED EAR WAX UNI: CPT | Performed by: PHYSICIAN ASSISTANT

## 2022-10-19 PROCEDURE — 99284 EMERGENCY DEPT VISIT MOD MDM: CPT | Performed by: PHYSICIAN ASSISTANT

## 2022-10-19 RX ORDER — PREDNISONE 20 MG/1
40 TABLET ORAL DAILY
Qty: 8 TABLET | Refills: 0 | Status: SHIPPED | OUTPATIENT
Start: 2022-10-19

## 2022-10-19 RX ORDER — PREDNISONE 20 MG/1
40 TABLET ORAL ONCE
Status: COMPLETED | OUTPATIENT
Start: 2022-10-19 | End: 2022-10-19

## 2022-10-19 RX ADMIN — PREDNISONE 40 MG: 20 TABLET ORAL at 16:33

## 2022-10-19 NOTE — DISCHARGE INSTRUCTIONS
Follow-up with ENT Dr Edy Dutta  Call tomorrow to schedule appointment    Prednisone 40 mg daily starting tomorrow      Return to the ED for fever, worsening symptoms

## 2022-10-19 NOTE — ED PROVIDER NOTES
History  Chief Complaint   Patient presents with   • Hearing Loss     Reports unable to hear out L ear x 2 weeks     Patient is a 72-year-old white male with history of depression who reports 2 week history of progressive hearing loss in left ear  No otorrhea  No fever  No ear trauma  No ear pain  No sore throat  No other complaints  Prior to Admission Medications   Prescriptions Last Dose Informant Patient Reported? Taking? cyclobenzaprine (FLEXERIL) 10 mg tablet   No No   Sig: Take 1 tablet (10 mg total) by mouth 2 (two) times a day as needed for muscle spasms   Patient not taking: Reported on 6/18/2021   gabapentin (NEURONTIN) 300 mg capsule   No No   Sig: Take 1 capsule (300 mg total) by mouth daily for 7 days one capsule on day 1 and then one capsule twice a day on day 2 and then 3 times a day on day 3 and beyond   gabapentin (Neurontin) 300 mg capsule Not Taking at Unknown time  No No   Sig: Take 1 capsule (300 mg total) by mouth 3 (three) times a day Take 1 capsule on day 1,  Then take 2 capsules on day 2, Then take 3 capsules on day 3 and every day after that as instructed by your doctor  Patient not taking: Reported on 10/19/2022   naproxen (NAPROSYN) 500 mg tablet   No No   Sig: Take 1 tablet (500 mg total) by mouth 2 (two) times a day with meals   Patient not taking: Reported on 6/18/2021      Facility-Administered Medications: None       Past Medical History:   Diagnosis Date   • Depression    • Psychiatric disorder     bipolar       History reviewed  No pertinent surgical history  History reviewed  No pertinent family history  I have reviewed and agree with the history as documented      E-Cigarette/Vaping   • E-Cigarette Use Never User      E-Cigarette/Vaping Substances     Social History     Tobacco Use   • Smoking status: Current Every Day Smoker     Packs/day: 1 00     Types: Cigarettes   • Smokeless tobacco: Never Used   Vaping Use   • Vaping Use: Never used   Substance Use Topics   • Alcohol use: Never     Comment: 2 cases of beer daily    • Drug use: Yes     Types: Marijuana       Review of Systems   Constitutional: Negative for chills and fever  HENT: Positive for hearing loss  Negative for congestion, ear discharge, ear pain and sore throat  Respiratory: Negative for cough and shortness of breath  Cardiovascular: Negative for chest pain and palpitations  Gastrointestinal: Negative for abdominal pain, nausea and vomiting  Genitourinary: Negative for dysuria and hematuria  Musculoskeletal: Negative for arthralgias and back pain  Skin: Negative for color change and rash  Neurological: Negative for syncope and headaches  All other systems reviewed and are negative  Physical Exam  Physical Exam  Vitals and nursing note reviewed  Constitutional:       General: He is not in acute distress  Appearance: Normal appearance  He is not ill-appearing, toxic-appearing or diaphoretic  HENT:      Head: Normocephalic and atraumatic  Right Ear: Tympanic membrane, ear canal and external ear normal       Left Ear: There is impacted cerumen  Ears:      Comments: Cerumen obstructing view of left TM  There is no pain on pulling at auricle  There is no pain pushing either tragus  No mastoid tenderness bilaterally  Partial obstruction of right TM with cerumen  Nose: Nose normal       Mouth/Throat:      Mouth: Mucous membranes are moist       Pharynx: Oropharynx is clear  Eyes:      Extraocular Movements: Extraocular movements intact  Conjunctiva/sclera: Conjunctivae normal       Pupils: Pupils are equal, round, and reactive to light  Cardiovascular:      Rate and Rhythm: Normal rate and regular rhythm  Pulses: Normal pulses  Heart sounds: Normal heart sounds  Pulmonary:      Effort: Pulmonary effort is normal       Breath sounds: Normal breath sounds  Abdominal:      General: Abdomen is flat   Bowel sounds are normal  Palpations: Abdomen is soft  Musculoskeletal:         General: Normal range of motion  Cervical back: Normal range of motion and neck supple  Skin:     General: Skin is warm and dry  Capillary Refill: Capillary refill takes less than 2 seconds  Neurological:      General: No focal deficit present  Mental Status: He is alert and oriented to person, place, and time  Mental status is at baseline  Vital Signs  ED Triage Vitals [10/19/22 1511]   Temperature Pulse Respirations Blood Pressure SpO2   97 5 °F (36 4 °C) 99 16 (!) 162/111 98 %      Temp Source Heart Rate Source Patient Position - Orthostatic VS BP Location FiO2 (%)   Oral Monitor Sitting Left arm --      Pain Score       No Pain           Vitals:    10/19/22 1511   BP: (!) 162/111   Pulse: 99   Patient Position - Orthostatic VS: Sitting         Visual Acuity      ED Medications  Medications   predniSONE tablet 40 mg (has no administration in time range)       Diagnostic Studies  Results Reviewed     None                 No orders to display              Procedures  Procedures         ED Course  ED Course as of 10/19/22 1623   Wed Oct 19, 2022   1602 Procedure: L ear canal irrigated with sterile NS/peroxide after wax was softened with same  Complete removal of L cerumen on repeat exam  L TM intact with bony landmarks visualized  Pt reports mild improvement in hearing  [place] texted ENT Dr John Paul Araujo to ensure close outpatient follow up  Will place patient on short course of prednisone and have him follow up with ENT                                              MDM  Number of Diagnoses or Management Options  Hearing loss: new and requires workup  Impacted cerumen of left ear: new and requires workup  Diagnosis management comments: 79-year-old white male presenting with 2 week history of progressive left hearing loss  At complete cerumen impaction of left ear canal   Cerumen was completely irrigated with warm water/peroxide mixture  Mild improvement of hearing  [place] texted ENT DR Kerri Boogie to ensure close follow up  He is agreeable with short course of prednisone  Return precautions given        Amount and/or Complexity of Data Reviewed  Decide to obtain previous medical records or to obtain history from someone other than the patient: yes  Review and summarize past medical records: yes  Independent visualization of images, tracings, or specimens: yes    Risk of Complications, Morbidity, and/or Mortality  Presenting problems: low  Diagnostic procedures: low  Management options: low    Patient Progress  Patient progress: stable      Disposition  Final diagnoses:   Impacted cerumen of left ear   Hearing loss     Time reflects when diagnosis was documented in both MDM as applicable and the Disposition within this note     Time User Action Codes Description Comment    10/19/2022  4:18 PM Judyth Holland Add [H61 20] Cerumen impaction     10/19/2022  4:18 PM Haylie Rodrigez Remove [H61 20] Cerumen impaction     10/19/2022  4:19 PM Judyth Holland Add [H61 22] Impacted cerumen of left ear     10/19/2022  4:19 PM Rochelle Holland Add [H91 90] Hearing loss       ED Disposition     ED Disposition   Discharge    Condition   Stable    Date/Time   Wed Oct 19, 2022  4:18 PM    Comment   Patrick Swanson discharge to home/self care  Follow-up Information     Follow up With Specialties Details Why [address], MD Otolaryngology   [place] [address]  [phone]            Patient's Medications   Discharge Prescriptions    PREDNISONE 20 MG TABLET    Take 2 tablets (40 mg total) by mouth daily       Start Date: 10/19/2022End Date: --       Order Dose: 40 mg       Quantity: 8 tablet    Refills: 0       No discharge procedures on file      PDMP Review     None          ED Provider  Electronically Signed by           Mary Batpiste PA-C  10/19/22 [id number]

## 2022-12-10 ENCOUNTER — APPOINTMENT (EMERGENCY)
Dept: CT IMAGING | Facility: HOSPITAL | Age: 37
End: 2022-12-10

## 2022-12-10 ENCOUNTER — APPOINTMENT (EMERGENCY)
Dept: RADIOLOGY | Facility: HOSPITAL | Age: 37
End: 2022-12-10

## 2022-12-10 ENCOUNTER — HOSPITAL ENCOUNTER (INPATIENT)
Facility: HOSPITAL | Age: 37
LOS: 3 days | End: 2022-12-13
Attending: EMERGENCY MEDICINE | Admitting: INTERNAL MEDICINE

## 2022-12-10 DIAGNOSIS — E87.1 HYPONATREMIA: ICD-10-CM

## 2022-12-10 DIAGNOSIS — R06.02 SOB (SHORTNESS OF BREATH): Primary | ICD-10-CM

## 2022-12-10 DIAGNOSIS — R59.0 RETROPERITONEAL LYMPHADENOPATHY: ICD-10-CM

## 2022-12-10 DIAGNOSIS — N50.89 TESTICULAR MASS: ICD-10-CM

## 2022-12-10 PROBLEM — Z72.0 TOBACCO USE: Status: ACTIVE | Noted: 2022-12-10

## 2022-12-10 PROBLEM — K42.9 UMBILICAL HERNIA WITHOUT OBSTRUCTION AND WITHOUT GANGRENE: Status: ACTIVE | Noted: 2022-12-10

## 2022-12-10 PROBLEM — I10 HYPERTENSION: Status: ACTIVE | Noted: 2022-12-10

## 2022-12-10 PROBLEM — R63.5 UNEXPLAINED WEIGHT GAIN: Status: ACTIVE | Noted: 2022-12-10

## 2022-12-10 LAB
ALBUMIN SERPL BCP-MCNC: 4.3 G/DL (ref 3.5–5)
ALP SERPL-CCNC: 92 U/L (ref 34–104)
ALT SERPL W P-5'-P-CCNC: 51 U/L (ref 7–52)
ANION GAP SERPL CALCULATED.3IONS-SCNC: 8 MMOL/L (ref 4–13)
APTT PPP: 25 SECONDS (ref 23–37)
AST SERPL W P-5'-P-CCNC: 23 U/L (ref 13–39)
BASOPHILS # BLD AUTO: 0.08 THOUSANDS/ÂΜL (ref 0–0.1)
BASOPHILS NFR BLD AUTO: 1 % (ref 0–1)
BILIRUB DIRECT SERPL-MCNC: 0.07 MG/DL (ref 0–0.2)
BILIRUB SERPL-MCNC: 0.34 MG/DL (ref 0.2–1)
BILIRUB UR QL STRIP: NEGATIVE
BNP SERPL-MCNC: 7 PG/ML (ref 0–100)
BUN SERPL-MCNC: 13 MG/DL (ref 5–25)
CALCIUM SERPL-MCNC: 9.4 MG/DL (ref 8.4–10.2)
CARDIAC TROPONIN I PNL SERPL HS: 3 NG/L
CHLORIDE SERPL-SCNC: 92 MMOL/L (ref 96–108)
CHOLEST SERPL-MCNC: 183 MG/DL
CLARITY UR: CLEAR
CO2 SERPL-SCNC: 29 MMOL/L (ref 21–32)
COLOR UR: YELLOW
CREAT SERPL-MCNC: 0.88 MG/DL (ref 0.6–1.3)
EOSINOPHIL # BLD AUTO: 0.17 THOUSAND/ÂΜL (ref 0–0.61)
EOSINOPHIL NFR BLD AUTO: 2 % (ref 0–6)
ERYTHROCYTE [DISTWIDTH] IN BLOOD BY AUTOMATED COUNT: 12.4 % (ref 11.6–15.1)
FLUAV RNA RESP QL NAA+PROBE: NEGATIVE
FLUBV RNA RESP QL NAA+PROBE: NEGATIVE
GFR SERPL CREATININE-BSD FRML MDRD: 111 ML/MIN/1.73SQ M
GLUCOSE SERPL-MCNC: 86 MG/DL (ref 65–140)
GLUCOSE UR STRIP-MCNC: NEGATIVE MG/DL
HCT VFR BLD AUTO: 41.1 % (ref 36.5–49.3)
HDLC SERPL-MCNC: 45 MG/DL
HGB BLD-MCNC: 14.6 G/DL (ref 12–17)
HGB UR QL STRIP.AUTO: NEGATIVE
IMM GRANULOCYTES # BLD AUTO: 0.14 THOUSAND/UL (ref 0–0.2)
IMM GRANULOCYTES NFR BLD AUTO: 1 % (ref 0–2)
INR PPP: 0.88 (ref 0.84–1.19)
KETONES UR STRIP-MCNC: NEGATIVE MG/DL
LACTATE SERPL-SCNC: 0.9 MMOL/L (ref 0.5–2)
LDLC SERPL CALC-MCNC: 82 MG/DL (ref 0–100)
LEUKOCYTE ESTERASE UR QL STRIP: NEGATIVE
LIPASE SERPL-CCNC: 40 U/L (ref 11–82)
LYMPHOCYTES # BLD AUTO: 2.61 THOUSANDS/ÂΜL (ref 0.6–4.47)
LYMPHOCYTES NFR BLD AUTO: 25 % (ref 14–44)
MAGNESIUM SERPL-MCNC: 2 MG/DL (ref 1.9–2.7)
MCH RBC QN AUTO: 33.3 PG (ref 26.8–34.3)
MCHC RBC AUTO-ENTMCNC: 35.5 G/DL (ref 31.4–37.4)
MCV RBC AUTO: 94 FL (ref 82–98)
MONOCYTES # BLD AUTO: 0.8 THOUSAND/ÂΜL (ref 0.17–1.22)
MONOCYTES NFR BLD AUTO: 8 % (ref 4–12)
NEUTROPHILS # BLD AUTO: 6.66 THOUSANDS/ÂΜL (ref 1.85–7.62)
NEUTS SEG NFR BLD AUTO: 63 % (ref 43–75)
NITRITE UR QL STRIP: NEGATIVE
NONHDLC SERPL-MCNC: 138 MG/DL
NRBC BLD AUTO-RTO: 0 /100 WBCS
PH UR STRIP.AUTO: 6 [PH]
PHOSPHATE SERPL-MCNC: 3.3 MG/DL (ref 2.7–4.5)
PLATELET # BLD AUTO: 293 THOUSANDS/UL (ref 149–390)
PMV BLD AUTO: 8.8 FL (ref 8.9–12.7)
POTASSIUM SERPL-SCNC: 3.6 MMOL/L (ref 3.5–5.3)
PROT SERPL-MCNC: 7.4 G/DL (ref 6.4–8.4)
PROT UR STRIP-MCNC: NEGATIVE MG/DL
PROTHROMBIN TIME: 12.3 SECONDS (ref 11.6–14.5)
RBC # BLD AUTO: 4.39 MILLION/UL (ref 3.88–5.62)
RSV RNA RESP QL NAA+PROBE: NEGATIVE
SARS-COV-2 RNA RESP QL NAA+PROBE: NEGATIVE
SODIUM SERPL-SCNC: 129 MMOL/L (ref 135–147)
SP GR UR STRIP.AUTO: 1.02 (ref 1–1.03)
TRIGL SERPL-MCNC: 280 MG/DL
TSH SERPL DL<=0.05 MIU/L-ACNC: 1.29 UIU/ML (ref 0.45–4.5)
URATE SERPL-MCNC: 3.4 MG/DL (ref 3.5–8.5)
UROBILINOGEN UR QL STRIP.AUTO: 0.2 E.U./DL
WBC # BLD AUTO: 10.46 THOUSAND/UL (ref 4.31–10.16)

## 2022-12-10 RX ORDER — NICOTINE 21 MG/24HR
1 PATCH, TRANSDERMAL 24 HOURS TRANSDERMAL DAILY
Status: DISCONTINUED | OUTPATIENT
Start: 2022-12-11 | End: 2022-12-11

## 2022-12-10 RX ORDER — ACETAMINOPHEN 325 MG/1
650 TABLET ORAL EVERY 6 HOURS PRN
Status: DISCONTINUED | OUTPATIENT
Start: 2022-12-10 | End: 2022-12-13 | Stop reason: HOSPADM

## 2022-12-10 RX ORDER — TAMSULOSIN HYDROCHLORIDE 0.4 MG/1
0.4 CAPSULE ORAL
Status: DISCONTINUED | OUTPATIENT
Start: 2022-12-10 | End: 2022-12-13 | Stop reason: HOSPADM

## 2022-12-10 RX ORDER — ALBUTEROL SULFATE 2.5 MG/3ML
2.5 SOLUTION RESPIRATORY (INHALATION) EVERY 6 HOURS PRN
Status: DISCONTINUED | OUTPATIENT
Start: 2022-12-10 | End: 2022-12-13 | Stop reason: HOSPADM

## 2022-12-10 RX ORDER — LOSARTAN POTASSIUM 25 MG/1
25 TABLET ORAL DAILY
Status: DISCONTINUED | OUTPATIENT
Start: 2022-12-11 | End: 2022-12-11

## 2022-12-10 RX ADMIN — TAMSULOSIN HYDROCHLORIDE 0.4 MG: 0.4 CAPSULE ORAL at 23:35

## 2022-12-10 RX ADMIN — IOHEXOL 100 ML: 350 INJECTION, SOLUTION INTRAVENOUS at 19:22

## 2022-12-10 NOTE — ED PROVIDER NOTES
History  Chief Complaint   Patient presents with   • Flank Pain     "I think I have a thyroid problem I gained 120 pounds in less than two months" L sided abdominal pain, x3 weeks     Previously healthy 72-year-old male presents with complaints of 2 months of significant weight gain  Patient states that he was 195 pounds 2 months ago and is now 300 pounds  Patient states that in the last 2 to 3 weeks he has developed progressive shortness of breath especially with exertion  He states that his abdomen feels bloated and also that he has what feels like a painful mass in his left upper abdomen near his ribs which he noticed 2 to 3 weeks ago  Jean Keller He denies any changes in diet  He denies known history of liver or kidney or heart disease  He does think that his face looks puffier than normal   He reports normal to decreased urine output  He denies fever or chills  He does describe early satiety  Also reports occasional blurry vision  Flank Pain  Associated symptoms: shortness of breath    Associated symptoms: no chest pain, no chills, no cough, no diarrhea, no dysuria, no fever, no hematuria, no nausea, no sore throat and no vomiting        Prior to Admission Medications   Prescriptions Last Dose Informant Patient Reported? Taking? cyclobenzaprine (FLEXERIL) 10 mg tablet   No No   Sig: Take 1 tablet (10 mg total) by mouth 2 (two) times a day as needed for muscle spasms   Patient not taking: Reported on 6/18/2021   gabapentin (NEURONTIN) 300 mg capsule   No No   Sig: Take 1 capsule (300 mg total) by mouth daily for 7 days one capsule on day 1 and then one capsule twice a day on day 2 and then 3 times a day on day 3 and beyond   gabapentin (Neurontin) 300 mg capsule   No No   Sig: Take 1 capsule (300 mg total) by mouth 3 (three) times a day Take 1 capsule on day 1,  Then take 2 capsules on day 2, Then take 3 capsules on day 3 and every day after that as instructed by your doctor     Patient not taking: Reported on 10/19/2022   naproxen (NAPROSYN) 500 mg tablet   No No   Sig: Take 1 tablet (500 mg total) by mouth 2 (two) times a day with meals   Patient not taking: Reported on 6/18/2021   predniSONE 20 mg tablet   No No   Sig: Take 2 tablets (40 mg total) by mouth daily      Facility-Administered Medications: None       Past Medical History:   Diagnosis Date   • Depression    • Psychiatric disorder     bipolar       History reviewed  No pertinent surgical history  History reviewed  No pertinent family history  I have reviewed and agree with the history as documented  E-Cigarette/Vaping   • E-Cigarette Use Never User      E-Cigarette/Vaping Substances     Social History     Tobacco Use   • Smoking status: Every Day     Packs/day: 1 00     Types: Cigarettes   • Smokeless tobacco: Never   Vaping Use   • Vaping Use: Never used   Substance Use Topics   • Alcohol use: Not Currently     Comment: 2 cases of beer daily    • Drug use: Yes     Types: Marijuana       Review of Systems   Constitutional: Positive for appetite change and unexpected weight change  Negative for chills and fever  HENT: Positive for facial swelling  Negative for rhinorrhea, sore throat and trouble swallowing  Eyes: Positive for visual disturbance  Negative for pain  Respiratory: Positive for shortness of breath  Negative for cough and chest tightness  Cardiovascular: Negative for chest pain and leg swelling  Gastrointestinal: Positive for abdominal distention and abdominal pain  Negative for diarrhea, nausea and vomiting  Genitourinary: Positive for flank pain  Negative for dysuria and hematuria  Musculoskeletal: Negative for arthralgias and joint swelling  Skin: Negative for color change and rash  Neurological: Negative for syncope, light-headedness and headaches  Hematological: Does not bruise/bleed easily  Psychiatric/Behavioral: Negative for agitation and confusion         Physical Exam  Physical Exam  Constitutional: General: He is not in acute distress  HENT:      Head: Normocephalic and atraumatic  Right Ear: External ear normal       Left Ear: External ear normal       Nose: Nose normal       Mouth/Throat:      Mouth: Mucous membranes are moist       Pharynx: Oropharynx is clear  Eyes:      Conjunctiva/sclera: Conjunctivae normal       Pupils: Pupils are equal, round, and reactive to light  Cardiovascular:      Rate and Rhythm: Regular rhythm  Tachycardia present  Pulses: Normal pulses  Heart sounds: Normal heart sounds  Pulmonary:      Breath sounds: Wheezing present  No rales  Comments: Mild tachypnea and conversational dyspnea  Abdominal:      General: There is distension  Palpations: There is mass  Tenderness: There is abdominal tenderness  There is no guarding or rebound  Hernia: A hernia is present  Comments: LUQ tenderness w increased firmness   Musculoskeletal:         General: Normal range of motion  Cervical back: Normal range of motion  Right lower leg: No edema  Left lower leg: No edema  Skin:     General: Skin is warm and dry  Coloration: Skin is not jaundiced  Findings: No bruising or rash  Neurological:      General: No focal deficit present  Mental Status: He is alert and oriented to person, place, and time     Psychiatric:         Mood and Affect: Mood normal          Behavior: Behavior normal          Vital Signs  ED Triage Vitals [12/10/22 1717]   Temperature Pulse Respirations Blood Pressure SpO2   (!) 97 4 °F (36 3 °C) (!) 112 (!) 28 (!) 179/120 98 %      Temp Source Heart Rate Source Patient Position - Orthostatic VS BP Location FiO2 (%)   Oral Monitor Sitting Left arm --      Pain Score       7           Vitals:    12/10/22 1717 12/10/22 1835   BP: (!) 179/120 (!) 163/102   Pulse: (!) 112 102   Patient Position - Orthostatic VS: Sitting Sitting         Visual Acuity      ED Medications  Medications   iohexol (OMNIPAQUE) 350 MG/ML injection (SINGLE-DOSE) 100 mL (100 mL Intravenous Given 12/10/22 1922)       Diagnostic Studies  Results Reviewed     Procedure Component Value Units Date/Time    TSH [[id number]]  (Normal) Collected: 12/10/22 1753    Lab Status: Final result Specimen: Blood from Arm, Left Updated: 12/10/22 1841     TSH 3RD GENERATON 1 295 uIU/mL     Narrative:      Patients undergoing fluorescein dye angiography may retain small amounts of fluorescein in the body for 48-72 hours post procedure  Samples containing fluorescein can produce falsely depressed TSH values  If the patient had this procedure,a specimen should be resubmitted post fluorescein clearance        B-Type Natriuretic Peptide(BNP) AN, CA, EA [facility] Only [[id number]]  (Normal) Collected: 12/10/22 1753    Lab Status: Final result Specimen: Blood from Arm, Left Updated: 12/10/22 1831     BNP 7 pg/mL     Hepatic function panel [[id number]]  (Normal) Collected: 12/10/22 1753    Lab Status: Final result Specimen: Blood from Arm, Left Updated: 12/10/22 1824     Total Bilirubin 0 34 mg/dL      Bilirubin, Direct 0 07 mg/dL      Alkaline Phosphatase 92 U/L      AST 23 U/L      ALT 51 U/L      Total Protein 7 4 g/dL      Albumin 4 3 g/dL     Basic metabolic panel [[id number]]  (Abnormal) Collected: 12/10/22 1753    Lab Status: Final result Specimen: Blood from Arm, Left Updated: 12/10/22 1824     Sodium 129 mmol/L      Potassium 3 6 mmol/L      Chloride 92 mmol/L      CO2 29 mmol/L      ANION GAP 8 mmol/L      BUN 13 mg/dL      Creatinine 0 88 mg/dL      Glucose 86 mg/dL      Calcium 9 4 mg/dL      eGFR 111 ml/min/1 73sq m     Narrative:      [place] guidelines for Chronic Kidney Disease (CKD):   •  Stage 1 with normal or high GFR (GFR > 90 mL/min/1 73 square meters)  •  Stage 2 Mild CKD (GFR = 60-89 mL/min/1 73 square meters)  •  Stage 3A Moderate CKD (GFR = 45-59 mL/min/1 73 square meters)  •  Stage 3B Moderate CKD (GFR = 30-44 mL/min/1 73 square meters)  •  Stage 4 Severe CKD (GFR = 15-29 mL/min/1 73 square meters)  •  Stage 5 End Stage CKD (GFR <15 mL/min/1 73 square meters)  Note: GFR calculation is accurate only with a steady state creatinine    Magnesium [[id number]]  (Normal) Collected: 12/10/22 1753    Lab Status: Final result Specimen: Blood from Arm, Left Updated: 12/10/22 1824     Magnesium 2 0 mg/dL     Phosphorus [[id number]]  (Normal) Collected: 12/10/22 1753    Lab Status: Final result Specimen: Blood from Arm, Left Updated: 12/10/22 1824     Phosphorus 3 3 mg/dL     Lipase [[id number]]  (Normal) Collected: 12/10/22 1753    Lab Status: Final result Specimen: Blood from Arm, Left Updated: 12/10/22 1824     Lipase 40 u/L     Lactic acid [[id number]]  (Normal) Collected: 12/10/22 1753    Lab Status: Final result Specimen: Blood from Arm, Left Updated: 12/10/22 1823     LACTIC ACID 0 9 mmol/L     Narrative:      Result may be elevated if tourniquet was used during collection      Adrián Hazafar [[id number]]  (Normal) Collected: 12/10/22 1753    Lab Status: Final result Specimen: Blood from Arm, Left Updated: 12/10/22 1813     Protime 12 3 seconds      INR 0 88    APTT [[id number]]  (Normal) Collected: 12/10/22 1753    Lab Status: Final result Specimen: Blood from Arm, Left Updated: 12/10/22 1813     PTT 25 seconds     UA (URINE) with reflex to Scope [[id number]]  (Normal) Collected: 12/10/22 1753    Lab Status: Final result Specimen: Urine, Other Updated: 12/10/22 1805     Color, UA Yellow     Clarity, UA Clear     Specific [place], UA 1 020     pH, UA 6 0     Leukocytes, UA Negative     Nitrite, UA Negative     Protein, UA Negative mg/dl      Glucose, UA Negative mg/dl      Ketones, UA Negative mg/dl      Urobilinogen, UA 0 2 E U /dl      Bilirubin, UA Negative     Occult Blood, UA Negative    CBC and differential [[id number]]  (Abnormal) Collected: 12/10/22 1753    Lab Status: Final result Specimen: Blood from Arm, Left Updated: 12/10/22 1805     WBC 10 46 Thousand/uL      RBC 4 39 Million/uL      Hemoglobin 14 6 g/dL      Hematocrit 41 1 %      MCV 94 fL      MCH 33 3 pg      MCHC 35 5 g/dL      RDW 12 4 %      MPV 8 8 fL      Platelets 551 Thousands/uL      nRBC 0 /100 WBCs      Neutrophils Relative 63 %      Immat GRANS % 1 %      Lymphocytes Relative 25 %      Monocytes Relative 8 %      Eosinophils Relative 2 %      Basophils Relative 1 %      Neutrophils Absolute 6 66 Thousands/µL      Immature Grans Absolute 0 14 Thousand/uL      Lymphocytes Absolute 2 61 Thousands/µL      Monocytes Absolute 0 80 Thousand/µL      Eosinophils Absolute 0 17 Thousand/µL      Basophils Absolute 0 08 Thousands/µL                  CT abdomen pelvis with contrast    by Jennifer Weathers MD (12/10 2002)      XR chest 1 view portable    (Results Pending)              Procedures  Procedures         ED Course       30-year-old male presenting with 2 months of significant weight gain and subacute development of shortness of breath and left upper quadrant pain  On presentation patient is mildly tachycardic and hypertensive with some conversational dyspnea and left upper quadrant tenderness  Suspect fluid retention based on the degree of his weight gain although he does not have peripheral pitting edema  Has some trace wheeze on lung exam   Normal oxygenation  Chest x-ray does not show infiltrate or clearly demonstrate pulmonary edema  EKG shows normal sinus rhythm 98 bpm with anterior Q waves and overall normal ST and T waves, no priors available for comparison  Screening labs notable for hyponatremia to 129, normal LFTs, normal kidney function, normal CBC, normal TSH, normal BNP  CT chest abdomen pelvis prelim read shows retroperitoneal lymphadenopathy which is concerning for metastatic neoplasm, in this patient's case specifically testicular cancer    Patient had an abnormal scrotal ultrasound 1 year ago and was treated for an infection and did not have appropriate oncology evaluation as follow-up  We will plan to admit patient for further cardiac and endocrine work-up as well as initial oncology evaluation  SBIRT 22yo+    Flowsheet Row Most Recent Value   SBIRT (23 yo +)    In order to provide better care to our patients, we are screening all of our patients for alcohol and drug use  Would it be okay to ask you these screening questions? No Filed at: 12/10/2022 1728                    [facility]  Number of Diagnoses or Management Options     Amount and/or Complexity of Data Reviewed  Clinical lab tests: ordered and reviewed  Tests in the radiology section of CPT®: ordered and reviewed  Discussion of test results with the performing providers: yes  Decide to obtain previous medical records or to obtain history from someone other than the patient: yes  Obtain history from someone other than the patient: yes  Review and summarize past medical records: yes  Discuss the patient with other providers: yes  Independent visualization of images, tracings, or specimens: yes    Risk of Complications, Morbidity, and/or Mortality  Presenting problems: high  Diagnostic procedures: moderate  Management options: high    Patient Progress  Patient progress: stable      Disposition  Final diagnoses:   SOB (shortness of breath)   Retroperitoneal lymphadenopathy     Time reflects when diagnosis was documented in both MDM as applicable and the Disposition within this note     Time User Action Codes Description Comment    12/10/2022  8:42 PM Starr Liao Add [R06 02] SOB (shortness of breath)     12/10/2022  8:42 PM Starr Liao [R59 0] Retroperitoneal lymphadenopathy       ED Disposition     ED Disposition   Admit    Condition   Stable    Date/Time   Sat Dec 10, 2022  8:42 PM    Comment   Case was discussed with Vielka Zamora and the patient's admission status was agreed to be Admission Status: inpatient status to the service of Dr Stephanie Boo              Follow-up Information None         Patient's Medications   Discharge Prescriptions    No medications on file       No discharge procedures on file      PDMP Review     None          ED Provider  Electronically Signed by           Franklin Stanley MD  12/10/22 2044

## 2022-12-11 PROBLEM — R39.11 URINARY HESITANCY: Status: ACTIVE | Noted: 2022-12-11

## 2022-12-11 PROBLEM — E87.1 HYPONATREMIA: Status: ACTIVE | Noted: 2022-12-11

## 2022-12-11 LAB
2HR DELTA HS TROPONIN: 0 NG/L
AFP-TM SERPL-MCNC: 1.4 NG/ML (ref 0.5–8)
ANION GAP SERPL CALCULATED.3IONS-SCNC: 10 MMOL/L (ref 4–13)
ATRIAL RATE: 98 BPM
BASOPHILS # BLD AUTO: 0.06 THOUSANDS/ÂΜL (ref 0–0.1)
BASOPHILS NFR BLD AUTO: 1 % (ref 0–1)
BUN SERPL-MCNC: 12 MG/DL (ref 5–25)
CALCIUM SERPL-MCNC: 8.9 MG/DL (ref 8.4–10.2)
CARDIAC TROPONIN I PNL SERPL HS: 3 NG/L
CHLORIDE SERPL-SCNC: 97 MMOL/L (ref 96–108)
CO2 SERPL-SCNC: 24 MMOL/L (ref 21–32)
CORTIS AM PEAK SERPL-MCNC: 5.6 UG/DL (ref 4.2–22.4)
CREAT SERPL-MCNC: 0.73 MG/DL (ref 0.6–1.3)
D DIMER PPP FEU-MCNC: <0.27 UG/ML FEU
EOSINOPHIL # BLD AUTO: 0.19 THOUSAND/ÂΜL (ref 0–0.61)
EOSINOPHIL NFR BLD AUTO: 2 % (ref 0–6)
ERYTHROCYTE [DISTWIDTH] IN BLOOD BY AUTOMATED COUNT: 12.8 % (ref 11.6–15.1)
EST. AVERAGE GLUCOSE BLD GHB EST-MCNC: 103 MG/DL
GFR SERPL CREATININE-BSD FRML MDRD: 120 ML/MIN/1.73SQ M
GLUCOSE SERPL-MCNC: 91 MG/DL (ref 65–140)
HBA1C MFR BLD: 5.2 %
HCG SERPL QL: NEGATIVE
HCT VFR BLD AUTO: 39.4 % (ref 36.5–49.3)
HGB BLD-MCNC: 14 G/DL (ref 12–17)
IMM GRANULOCYTES # BLD AUTO: 0.12 THOUSAND/UL (ref 0–0.2)
IMM GRANULOCYTES NFR BLD AUTO: 1 % (ref 0–2)
LDH SERPL-CCNC: 199 U/L (ref 140–271)
LYMPHOCYTES # BLD AUTO: 2.56 THOUSANDS/ÂΜL (ref 0.6–4.47)
LYMPHOCYTES NFR BLD AUTO: 27 % (ref 14–44)
MCH RBC QN AUTO: 33.2 PG (ref 26.8–34.3)
MCHC RBC AUTO-ENTMCNC: 35.5 G/DL (ref 31.4–37.4)
MCV RBC AUTO: 93 FL (ref 82–98)
MONOCYTES # BLD AUTO: 0.69 THOUSAND/ÂΜL (ref 0.17–1.22)
MONOCYTES NFR BLD AUTO: 7 % (ref 4–12)
NEUTROPHILS # BLD AUTO: 5.88 THOUSANDS/ÂΜL (ref 1.85–7.62)
NEUTS SEG NFR BLD AUTO: 62 % (ref 43–75)
NRBC BLD AUTO-RTO: 0 /100 WBCS
OSMOLALITY UR: 729 MMOL/KG
P AXIS: 50 DEGREES
PLATELET # BLD AUTO: 276 THOUSANDS/UL (ref 149–390)
PMV BLD AUTO: 9.2 FL (ref 8.9–12.7)
POTASSIUM SERPL-SCNC: 3.5 MMOL/L (ref 3.5–5.3)
PR INTERVAL: 164 MS
QRS AXIS: 79 DEGREES
QRSD INTERVAL: 88 MS
QT INTERVAL: 354 MS
QTC INTERVAL: 451 MS
RBC # BLD AUTO: 4.22 MILLION/UL (ref 3.88–5.62)
SODIUM 24H UR-SCNC: 73 MOL/L
SODIUM SERPL-SCNC: 131 MMOL/L (ref 135–147)
T WAVE AXIS: 48 DEGREES
VENTRICULAR RATE: 98 BPM
WBC # BLD AUTO: 9.5 THOUSAND/UL (ref 4.31–10.16)

## 2022-12-11 RX ORDER — NICOTINE 21 MG/24HR
1 PATCH, TRANSDERMAL 24 HOURS TRANSDERMAL DAILY
Status: DISCONTINUED | OUTPATIENT
Start: 2022-12-11 | End: 2022-12-13 | Stop reason: HOSPADM

## 2022-12-11 RX ORDER — HYDROCHLOROTHIAZIDE 12.5 MG/1
12.5 TABLET ORAL DAILY
Status: DISCONTINUED | OUTPATIENT
Start: 2022-12-12 | End: 2022-12-11

## 2022-12-11 RX ORDER — LOSARTAN POTASSIUM 25 MG/1
25 TABLET ORAL DAILY
Status: DISCONTINUED | OUTPATIENT
Start: 2022-12-12 | End: 2022-12-12

## 2022-12-11 RX ORDER — LABETALOL HYDROCHLORIDE 5 MG/ML
10 INJECTION, SOLUTION INTRAVENOUS EVERY 6 HOURS PRN
Status: DISCONTINUED | OUTPATIENT
Start: 2022-12-11 | End: 2022-12-13 | Stop reason: HOSPADM

## 2022-12-11 RX ORDER — HYDRALAZINE HYDROCHLORIDE 20 MG/ML
5 INJECTION INTRAMUSCULAR; INTRAVENOUS EVERY 6 HOURS PRN
Status: DISCONTINUED | OUTPATIENT
Start: 2022-12-11 | End: 2022-12-11

## 2022-12-11 RX ORDER — SODIUM CHLORIDE, SODIUM GLUCONATE, SODIUM ACETATE, POTASSIUM CHLORIDE, MAGNESIUM CHLORIDE, SODIUM PHOSPHATE, DIBASIC, AND POTASSIUM PHOSPHATE .53; .5; .37; .037; .03; .012; .00082 G/100ML; G/100ML; G/100ML; G/100ML; G/100ML; G/100ML; G/100ML
75 INJECTION, SOLUTION INTRAVENOUS CONTINUOUS
Status: DISCONTINUED | OUTPATIENT
Start: 2022-12-11 | End: 2022-12-12

## 2022-12-11 RX ORDER — HYDROCHLOROTHIAZIDE 12.5 MG/1
12.5 TABLET ORAL DAILY
Status: DISCONTINUED | OUTPATIENT
Start: 2022-12-11 | End: 2022-12-11

## 2022-12-11 RX ORDER — LOSARTAN POTASSIUM 25 MG/1
25 TABLET ORAL DAILY
Status: DISCONTINUED | OUTPATIENT
Start: 2022-12-12 | End: 2022-12-11

## 2022-12-11 RX ORDER — LOSARTAN POTASSIUM 25 MG/1
25 TABLET ORAL ONCE
Status: COMPLETED | OUTPATIENT
Start: 2022-12-11 | End: 2022-12-11

## 2022-12-11 RX ADMIN — HYDRALAZINE HYDROCHLORIDE 5 MG: 20 INJECTION INTRAMUSCULAR; INTRAVENOUS at 08:51

## 2022-12-11 RX ADMIN — LOSARTAN POTASSIUM 25 MG: 25 TABLET, FILM COATED ORAL at 08:47

## 2022-12-11 RX ADMIN — HYDRALAZINE HYDROCHLORIDE 5 MG: 20 INJECTION INTRAMUSCULAR; INTRAVENOUS at 01:48

## 2022-12-11 RX ADMIN — NICOTINE 1 PATCH: 14 PATCH, EXTENDED RELEASE TRANSDERMAL at 00:15

## 2022-12-11 RX ADMIN — NICOTINE 1 PATCH: 14 PATCH, EXTENDED RELEASE TRANSDERMAL at 08:44

## 2022-12-11 RX ADMIN — LOSARTAN POTASSIUM 25 MG: 25 TABLET, FILM COATED ORAL at 13:58

## 2022-12-11 RX ADMIN — SODIUM CHLORIDE, SODIUM GLUCONATE, SODIUM ACETATE, POTASSIUM CHLORIDE AND MAGNESIUM CHLORIDE 75 ML/HR: 526; 502; 368; 37; 30 INJECTION, SOLUTION INTRAVENOUS at 18:49

## 2022-12-11 RX ADMIN — TAMSULOSIN HYDROCHLORIDE 0.4 MG: 0.4 CAPSULE ORAL at 16:49

## 2022-12-11 NOTE — PLAN OF CARE
Problem: PAIN - ADULT  Goal: Verbalizes/displays adequate comfort level or baseline comfort level  Description: Interventions:  - Encourage patient to monitor pain and request assistance  - Assess pain using appropriate pain scale  - Administer analgesics based on type and severity of pain and evaluate response  - Implement non-pharmacological measures as appropriate and evaluate response  - Consider cultural and social influences on pain and pain management  - Notify physician/advanced practitioner if interventions unsuccessful or patient reports new pain  Outcome: Progressing     Problem: INFECTION - ADULT  Goal: Absence or prevention of progression during hospitalization  Description: INTERVENTIONS:  - Assess and monitor for signs and symptoms of infection  - Monitor lab/diagnostic results  - Monitor all insertion sites, i e  indwelling lines, tubes, and drains  - Monitor endotracheal if appropriate and nasal secretions for changes in amount and color  - [place] appropriate cooling/warming therapies per order  - Administer medications as ordered  - Instruct and encourage patient and family to use good hand hygiene technique  - Identify and instruct in appropriate isolation precautions for identified infection/condition  Outcome: Progressing  Goal: Absence of fever/infection during neutropenic period  Description: INTERVENTIONS:  - Monitor WBC    Outcome: Progressing     Problem: SAFETY ADULT  Goal: Patient will remain free of falls  Description: INTERVENTIONS:  - Educate patient/family on patient safety including physical limitations  - Instruct patient to call for assistance with activity   - Consult OT/PT to assist with strengthening/mobility   - Keep Call bell within reach  - Keep bed low and locked with side rails adjusted as appropriate  - Keep care items and personal belongings within reach  - Initiate and maintain comfort rounds  - Make Fall Risk Sign visible to staff  - Apply yellow socks and bracelet for high fall risk patients  - Consider moving patient to room near nurses station  Outcome: Progressing  Goal: Maintain or return to baseline ADL function  Description: INTERVENTIONS:  -  Assess patient's ability to carry out ADLs; assess patient's baseline for ADL function and identify physical deficits which impact ability to perform ADLs (bathing, care of mouth/teeth, toileting, grooming, dressing, etc )  - Assess/evaluate cause of self-care deficits   - Assess range of motion  - Assess patient's mobility; develop plan if impaired  - Assess patient's need for assistive devices and provide as appropriate  - Encourage maximum independence but intervene and supervise when necessary  - Involve family in performance of ADLs  - Assess for home care needs following discharge   - Consider OT consult to assist with ADL evaluation and planning for discharge  - Provide patient education as appropriate  Outcome: Progressing  Goal: Maintains/Returns to pre admission functional level  Description: INTERVENTIONS:  - Perform BMAT or MOVE assessment daily    - Set and communicate daily mobility goal to care team and patient/family/caregiver     - Collaborate with rehabilitation services on mobility goals if consulted  - Out of bed for toileting  - Record patient progress and toleration of activity level   Outcome: Progressing     Problem: DISCHARGE PLANNING  Goal: Discharge to home or other facility with appropriate resources  Description: INTERVENTIONS:  - Identify barriers to discharge w/patient and caregiver  - Arrange for needed discharge resources and transportation as appropriate  - Identify discharge learning needs (meds, wound care, etc )  - Arrange for interpretive services to assist at discharge as needed  - Refer to Case Management Department for coordinating discharge planning if the patient needs post-hospital services based on physician/advanced practitioner order or complex needs related to functional status, cognitive ability, or social support system  Outcome: Progressing     Problem: Knowledge Deficit  Goal: Patient/family/caregiver demonstrates understanding of disease process, treatment plan, medications, and discharge instructions  Description: Complete learning assessment and assess knowledge base    Interventions:  - Provide teaching at level of understanding  - Provide teaching via preferred learning methods  Outcome: Progressing     Problem: Potential for Falls  Goal: Patient will remain free of falls  Description: INTERVENTIONS:  - Educate patient/family on patient safety including physical limitations  - Instruct patient to call for assistance with activity   - Consult OT/PT to assist with strengthening/mobility   - Keep Call bell within reach  - Keep bed low and locked with side rails adjusted as appropriate  - Keep care items and personal belongings within reach  - Initiate and maintain comfort rounds  - Make Fall Risk Sign visible to staff  - Offer Toileting every 2 Hours, in advance of need  - Apply yellow socks and bracelet for high fall risk patients  - Consider moving patient to room near nurses station  Outcome: Progressing

## 2022-12-11 NOTE — ASSESSMENT & PLAN NOTE
· With accelerated hypertension on admission, no prior diagnosis of hypertension  · Started on losartan and Flomax given BPH symptoms

## 2022-12-11 NOTE — ASSESSMENT & PLAN NOTE
· Reports 120lbs weight gain over past 2 months  ·  Denies change in diet/exercise     · TSH wnl   · Check am cortisol, A1c, lipid panel

## 2022-12-11 NOTE — PLAN OF CARE
Problem: PAIN - ADULT  Goal: Verbalizes/displays adequate comfort level or baseline comfort level  Description: Interventions:  - Encourage patient to monitor pain and request assistance  - Assess pain using appropriate pain scale  - Administer analgesics based on type and severity of pain and evaluate response  - Implement non-pharmacological measures as appropriate and evaluate response  - Consider cultural and social influences on pain and pain management  - Notify physician/advanced practitioner if interventions unsuccessful or patient reports new pain  Outcome: Progressing     Problem: INFECTION - ADULT  Goal: Absence or prevention of progression during hospitalization  Description: INTERVENTIONS:  - Assess and monitor for signs and symptoms of infection  - Monitor lab/diagnostic results  - Monitor all insertion sites, i e  indwelling lines, tubes, and drains  - Monitor endotracheal if appropriate and nasal secretions for changes in amount and color  - [place] appropriate cooling/warming therapies per order  - Administer medications as ordered  - Instruct and encourage patient and family to use good hand hygiene technique  - Identify and instruct in appropriate isolation precautions for identified infection/condition  Outcome: Progressing  Goal: Absence of fever/infection during neutropenic period  Description: INTERVENTIONS:  - Monitor WBC    Outcome: Progressing     Problem: SAFETY ADULT  Goal: Patient will remain free of falls  Description: INTERVENTIONS:  - Educate patient/family on patient safety including physical limitations  - Instruct patient to call for assistance with activity   - Consult OT/PT to assist with strengthening/mobility   - Keep Call bell within reach  - Keep bed low and locked with side rails adjusted as appropriate  - Keep care items and personal belongings within reach  - Initiate and maintain comfort rounds  - Make Fall Risk Sign visible to staff  - Offer Toileting every 2 Hours, in advance of need  - Obtain necessary fall risk management equipment:   - Apply yellow socks and bracelet for high fall risk patients  - Consider moving patient to room near nurses station  Outcome: Progressing  Goal: Maintain or return to baseline ADL function  Description: INTERVENTIONS:  -  Assess patient's ability to carry out ADLs; assess patient's baseline for ADL function and identify physical deficits which impact ability to perform ADLs (bathing, care of mouth/teeth, toileting, grooming, dressing, etc )  - Assess/evaluate cause of self-care deficits   - Assess range of motion  - Assess patient's mobility; develop plan if impaired  - Assess patient's need for assistive devices and provide as appropriate  - Encourage maximum independence but intervene and supervise when necessary  - Involve family in performance of ADLs  - Assess for home care needs following discharge   - Consider OT consult to assist with ADL evaluation and planning for discharge  - Provide patient education as appropriate  Outcome: Progressing  Goal: Maintains/Returns to pre admission functional level  Description: INTERVENTIONS:  - Perform BMAT or MOVE assessment daily    - Set and communicate daily mobility goal to care team and patient/family/caregiver  - Collaborate with rehabilitation services on mobility goals if consulted  - Perform Range of Motion 2 times a day    - Reposition self every 2 hours or PRN  - Dangle patient 3 times a day  - Stand patient 3 times a day  - Ambulate patient 3 times a day  - Out of bed to chair 3 times a day   - Out of bed for meals 3 times a day  - Out of bed for toileting  - Record patient progress and toleration of activity level   Outcome: Progressing     Problem: DISCHARGE PLANNING  Goal: Discharge to home or other facility with appropriate resources  Description: INTERVENTIONS:  - Identify barriers to discharge w/patient and caregiver  - Arrange for needed discharge resources and transportation as appropriate  - Identify discharge learning needs (meds, wound care, etc )  - Arrange for interpretive services to assist at discharge as needed  - Refer to Case Management Department for coordinating discharge planning if the patient needs post-hospital services based on physician/advanced practitioner order or complex needs related to functional status, cognitive ability, or social support system  Outcome: Progressing     Problem: Knowledge Deficit  Goal: Patient/family/caregiver demonstrates understanding of disease process, treatment plan, medications, and discharge instructions  Description: Complete learning assessment and assess knowledge base    Interventions:  - Provide teaching at level of understanding  - Provide teaching via preferred learning methods  Outcome: Progressing

## 2022-12-11 NOTE — ASSESSMENT & PLAN NOTE
· Planes of urinary hesitancy and intermittency  · UA negative   · Started on Flomax  · Monitor on urinary retention protocol

## 2022-12-11 NOTE — H&P
2000 [address] Rueda 1985, 30 y o  male MRN: [id number]  Unit/Bed#: -01 Encounter: [id number]  Primary Care Provider: Isma Walter MD   Date and time admitted to hospital: 12/10/2022  5:18 PM    * Shortness of breath  Assessment & Plan  · Reports increased SOB and FITZPATRICK over the past 3 weeks  Poor exercise tolerance  Denies chest pain, cough, wheezing, orthopnea, PND, edema  · COVID/flu/RSV negative   · CXR pending  · Echo ordered  · Check ECG/trop  · Albuterol p r n  · Recommend PFTs at discharge r/o underlying obstructive lung dz w/ tobacco use    Retroperitoneal lymphadenopathy  Assessment & Plan  · Reports night sweats, unexplained weight gain, early satiety, abdominal pain  Hx of testicular mass not improved with abx treatment and patient did not follow-up with urology  · CT abdomen pelvis revealing retroperitoneal lymphadenopathy concerning for malignant process, specifically metastatic testicular cancer given the findings on prior scrotal ultrasound  · Follow-up on AFP, hCG and LDH  · Scrotal/testicular ultrasound ordered  · Will consult urology    Hyponatremia  Assessment & Plan  · Na 129  · Hyponatremia labs sent  · Continue to monitor     Urinary hesitancy  Assessment & Plan  · Planes of urinary hesitancy and intermittency  · UA negative   · Started on Flomax  · Monitor on urinary retention protocol     Hypertension  Assessment & Plan  · With accelerated hypertension on admission, no prior diagnosis of hypertension  · Started on losartan and Flomax given BPH symptoms    Unexplained weight gain  Assessment & Plan  · Reports 120lbs weight gain over past 2 months  ·  Denies change in diet/exercise  · TSH wnl   · Check am cortisol, A1c, lipid panel     Tobacco use  Assessment & Plan  · 1ppd x 15 years  ·  on cessation   · Nicotine patch ordered    VTE Pharmacologic Prophylaxis: VTE Score: 1 Low Risk (Score 0-2) - Encourage Ambulation    Code Status: Level 1 - Full Code   Discussion with family: Updated  (sister) at bedside  Anticipated Length of Stay: Patient will be admitted on an inpatient basis with an anticipated length of stay of greater than 2 midnights secondary to Shortness of breath and retroperitoneal lymphadenopathy requiring further work-up, hyponatremia  Total Time for Visit, including Counseling / Coordination of Care: 45 minutes Greater than 50% of this total time spent on direct patient counseling and coordination of care  Chief Complaint: SOB    History of Present Illness:  Jeffy Gutierrez is a 30 y o  male with a PMH of testicular mass who presents with shortness of breath, dyspnea on exertion and weight gain  Reports he has been increasingly short of breath over the past 2 to 3 weeks  He denies associated fevers, chills, cough, wheezing, orthopnea, PND, edema  Reports he is easily fatigued and dyspneic with walking short distances  Does report 1 pack/day of tobacco use  Also with concerns of a 120 pound weight gain over the past 2 months  Denies any changes in diet or exercise  Does complain of early satiety, night sweats and left upper quadrant abdominal pain  He denies any fevers, chills, chest pain, nausea/vomiting/diarrhea, dizziness/lightheadedness, headache  He also has complaints of urinary hesitancy and intermittency  In ED, noted to be hyponatremic  Had CT abdomen pelvis revealing retroperitoneal adenopathy with concerns for metastatic testicular cancer  [place] for further evaluation of shortness of breath, treatment of hyponatremia and further work-up of retroperitoneal lymphadenopathy  Review of Systems:  Review of Systems   Constitutional: Positive for fatigue and unexpected weight change  Negative for fever  HENT: Negative for congestion  Eyes: Negative for visual disturbance  Respiratory: Positive for shortness of breath   Negative for cough, choking, chest tightness, wheezing and stridor  Cardiovascular: Negative for chest pain, palpitations and leg swelling  Gastrointestinal: Positive for abdominal distention and abdominal pain  Negative for diarrhea, nausea and vomiting  Endocrine: Negative for polyuria  Genitourinary: Positive for difficulty urinating and scrotal swelling  Negative for testicular pain  Musculoskeletal: Negative for gait problem  Skin: Negative for rash  Neurological: Negative for dizziness, speech difficulty and weakness  Psychiatric/Behavioral: Positive for sleep disturbance  Past Medical and Surgical History:   Past Medical History:   Diagnosis Date   • Depression    • Psychiatric disorder     bipolar       History reviewed  No pertinent surgical history  Meds/Allergies:  Prior to Admission medications    Medication Sig Start Date End Date Taking? Authorizing Provider   cyclobenzaprine (FLEXERIL) 10 mg tablet Take 1 tablet (10 mg total) by mouth 2 (two) times a day as needed for muscle spasms  Patient not taking: Reported on 6/18/2021 9/13/20   Charels Ray MD   gabapentin (NEURONTIN) 300 mg capsule Take 1 capsule (300 mg total) by mouth daily for 7 days one capsule on day 1 and then one capsule twice a day on day 2 and then 3 times a day on day 3 and beyond 7/19/22 7/26/22  Mame Echeverria DO   gabapentin (Neurontin) 300 mg capsule Take 1 capsule (300 mg total) by mouth 3 (three) times a day Take 1 capsule on day 1,  Then take 2 capsules on day 2, Then take 3 capsules on day 3 and every day after that as instructed by your doctor    Patient not taking: Reported on 10/19/2022 7/19/22   Mirlande De Leon MD   naproxen (NAPROSYN) 500 mg tablet Take 1 tablet (500 mg total) by mouth 2 (two) times a day with meals  Patient not taking: Reported on 6/18/2021 9/13/20   Charles Ray MD   predniSONE 20 mg tablet Take 2 tablets (40 mg total) by mouth daily  Patient not taking: Reported on 12/10/2022 10/19/22   Brina Camejo JIMMY Rodrigez     I have reviewed home medications with patient personally  Allergies: No Known Allergies    Social History:  Marital Status: Single   Occupation:   Patient Pre-hospital Living Situation: Home  Patient Pre-hospital Level of Mobility: walks  Patient Pre-hospital Diet Restrictions:   Substance Use History:   Social History     Substance and Sexual Activity   Alcohol Use Not Currently    Comment: 2 cases of beer daily      Social History     Tobacco Use   Smoking Status Every Day   • Packs/day: 1 00   • Types: Cigarettes   Smokeless Tobacco Never     Social History     Substance and Sexual Activity   Drug Use Yes   • Types: Marijuana       Family History:  History reviewed  No pertinent family history  Physical Exam:     Vitals:   Blood Pressure: (!) 161/107 (12/10/22 2337)  Pulse: 98 (12/10/22 2337)  Temperature: 98 1 °F (36 7 °C) (12/10/22 2337)  Temp Source: Oral (12/10/22 2337)  Respirations: 18 (12/10/22 2337)  Height: 6' 4" (193 cm) (12/10/22 2133)  Weight - Scale: (!) 137 kg (301 lb 0 8 oz) (12/10/22 2133)  SpO2: 97 % (12/10/22 2337)    Physical Exam  Vitals and nursing note reviewed  Constitutional:       General: He is not in acute distress  Appearance: He is not toxic-appearing  HENT:      Head: Normocephalic and atraumatic  Mouth/Throat:      Mouth: Mucous membranes are moist    Eyes:      Extraocular Movements: Extraocular movements intact  Pupils: Pupils are equal, round, and reactive to light  Cardiovascular:      Rate and Rhythm: Normal rate and regular rhythm  Pulses: Normal pulses  Heart sounds: Normal heart sounds  No murmur heard  Pulmonary:      Effort: Pulmonary effort is normal  No respiratory distress  Breath sounds: Normal breath sounds  No wheezing or rales  Comments: Decreased breath sounds    Abdominal:      General: Abdomen is flat  Bowel sounds are normal  There is distension  Palpations: Abdomen is soft  Tenderness: There is abdominal tenderness  There is no guarding  Musculoskeletal:      Right lower leg: No edema  Left lower leg: No edema  Skin:     General: Skin is warm and dry  Capillary Refill: Capillary refill takes less than 2 seconds  Neurological:      General: No focal deficit present  Mental Status: He is alert and oriented to person, place, and time  Cranial Nerves: No cranial nerve deficit  Psychiatric:         Mood and Affect: Mood normal  Affect is flat  Behavior: Behavior is slowed  Additional Data:   Lab Results:  Results from last 7 days   Lab Units 12/10/22  1753   WBC Thousand/uL 10 46*   HEMOGLOBIN g/dL 14 6   HEMATOCRIT % 41 1   PLATELETS Thousands/uL 293   NEUTROS PCT % 63   LYMPHS PCT % 25   MONOS PCT % 8   EOS PCT % 2     Results from last 7 days   Lab Units 12/10/22  1753   SODIUM mmol/L 129*   POTASSIUM mmol/L 3 6   CHLORIDE mmol/L 92*   CO2 mmol/L 29   BUN mg/dL 13   CREATININE mg/dL 0 88   ANION GAP mmol/L 8   CALCIUM mg/dL 9 4   ALBUMIN g/dL 4 3   TOTAL BILIRUBIN mg/dL 0 34   ALK PHOS U/L 92   ALT U/L 51   AST U/L 23   GLUCOSE RANDOM mg/dL 86     Results from last 7 days   Lab Units 12/10/22  1753   INR  0 88             Results from last 7 days   Lab Units 12/10/22  1753   LACTIC ACID mmol/L 0 9       Lines/Drains:  Invasive Devices     Peripheral Intravenous Line  Duration           Peripheral IV 12/10/22 Right Antecubital <1 day                    Imaging: Reviewed radiology reports from this admission including: abdominal/pelvic CT  CT abdomen pelvis with contrast   Final Result by Clark Cuello MD (12/10 2221)      Retroperitoneal lymphadenopathy which is concerning for a malignant process, specifically metastatic testicular cancer given the findings on prior scrotal ultrasound  Finding discussed with Dr Smith Section at the time of this dictation  Hepatic steatosis        Nonspecific fat stranding along the right common and external iliac vessels, of uncertain etiology  Workstation performed: [id number]         XR chest 1 view portable    (Results Pending)   US scrotum and testicles    (Results Pending)       EKG and Other Studies Reviewed on Admission:   · EKG: No EKG obtained  ** Please Note: This note has been constructed using a voice recognition system   **

## 2022-12-11 NOTE — ASSESSMENT & PLAN NOTE
· Reports increased SOB and FITZPATRICK over the past 3 weeks  Poor exercise tolerance  Denies chest pain, cough, wheezing, orthopnea, PND, edema  · COVID/flu/RSV negative   · CXR pending  · Echo ordered  · Check ECG/trop  · Albuterol p r n    · Recommend PFTs at discharge r/o underlying obstructive lung dz w/ tobacco use

## 2022-12-12 ENCOUNTER — APPOINTMENT (INPATIENT)
Dept: CT IMAGING | Facility: HOSPITAL | Age: 37
End: 2022-12-12

## 2022-12-12 ENCOUNTER — APPOINTMENT (INPATIENT)
Dept: NON INVASIVE DIAGNOSTICS | Facility: HOSPITAL | Age: 37
End: 2022-12-12

## 2022-12-12 ENCOUNTER — APPOINTMENT (INPATIENT)
Dept: ULTRASOUND IMAGING | Facility: HOSPITAL | Age: 37
End: 2022-12-12

## 2022-12-12 ENCOUNTER — APPOINTMENT (INPATIENT)
Dept: VASCULAR ULTRASOUND | Facility: HOSPITAL | Age: 37
End: 2022-12-12
Attending: INTERNAL MEDICINE

## 2022-12-12 LAB
ANION GAP SERPL CALCULATED.3IONS-SCNC: 10 MMOL/L (ref 4–13)
AORTIC ROOT: 3.2 CM
APICAL FOUR CHAMBER EJECTION FRACTION: 66 %
BUN SERPL-MCNC: 10 MG/DL (ref 5–25)
CALCIUM SERPL-MCNC: 9.4 MG/DL (ref 8.4–10.2)
CHLORIDE SERPL-SCNC: 94 MMOL/L (ref 96–108)
CO2 SERPL-SCNC: 25 MMOL/L (ref 21–32)
CREAT SERPL-MCNC: 0.76 MG/DL (ref 0.6–1.3)
E WAVE DECELERATION TIME: 214 MS
ERYTHROCYTE [DISTWIDTH] IN BLOOD BY AUTOMATED COUNT: 12.5 % (ref 11.6–15.1)
FRACTIONAL SHORTENING: 28 % (ref 28–44)
GFR SERPL CREATININE-BSD FRML MDRD: 118 ML/MIN/1.73SQ M
GLUCOSE SERPL-MCNC: 89 MG/DL (ref 65–140)
HCT VFR BLD AUTO: 40 % (ref 36.5–49.3)
HGB BLD-MCNC: 14.2 G/DL (ref 12–17)
INTERVENTRICULAR SEPTUM IN DIASTOLE (PARASTERNAL SHORT AXIS VIEW): 1.2 CM
INTERVENTRICULAR SEPTUM: 1.2 CM (ref 0.6–1.1)
LAAS-AP4: 15.6 CM2
LEFT ATRIUM SIZE: 3.7 CM
LEFT INTERNAL DIMENSION IN SYSTOLE: 2.8 CM (ref 2.1–4)
LEFT VENTRICULAR INTERNAL DIMENSION IN DIASTOLE: 3.9 CM (ref 3.5–6)
LEFT VENTRICULAR POSTERIOR WALL IN END DIASTOLE: 1.2 CM
LEFT VENTRICULAR STROKE VOLUME: 37 ML
LVSV (TEICH): 37 ML
MCH RBC QN AUTO: 33.6 PG (ref 26.8–34.3)
MCHC RBC AUTO-ENTMCNC: 35.5 G/DL (ref 31.4–37.4)
MCV RBC AUTO: 95 FL (ref 82–98)
MV E'TISSUE VEL-SEP: 9 CM/S
MV PEAK A VEL: 0.53 M/S
MV PEAK E VEL: 65 CM/S
MV STENOSIS PRESSURE HALF TIME: 62 MS
MV VALVE AREA P 1/2 METHOD: 3.55 CM2
OSMOLALITY UR/SERPL-RTO: 275 MMOL/KG (ref 282–298)
PLATELET # BLD AUTO: 274 THOUSANDS/UL (ref 149–390)
PMV BLD AUTO: 9.1 FL (ref 8.9–12.7)
POTASSIUM SERPL-SCNC: 3.9 MMOL/L (ref 3.5–5.3)
RBC # BLD AUTO: 4.22 MILLION/UL (ref 3.88–5.62)
RIGHT ATRIAL 2D VOLUME: 22 ML
RIGHT ATRIUM AREA SYSTOLE A4C: 11.1 CM2
RIGHT VENTRICLE ID DIMENSION: 2.6 CM
SL CV LV EF: 60
SL CV PED ECHO LEFT VENTRICLE DIASTOLIC VOLUME (MOD BIPLANE) 2D: 68 ML
SL CV PED ECHO LEFT VENTRICLE SYSTOLIC VOLUME (MOD BIPLANE) 2D: 30 ML
SODIUM SERPL-SCNC: 129 MMOL/L (ref 135–147)
WBC # BLD AUTO: 9.71 THOUSAND/UL (ref 4.31–10.16)

## 2022-12-12 RX ORDER — LOSARTAN POTASSIUM 50 MG/1
50 TABLET ORAL DAILY
Status: DISCONTINUED | OUTPATIENT
Start: 2022-12-13 | End: 2022-12-13

## 2022-12-12 RX ORDER — LORAZEPAM 0.5 MG/1
0.5 TABLET ORAL ONCE
Status: COMPLETED | OUTPATIENT
Start: 2022-12-12 | End: 2022-12-12

## 2022-12-12 RX ORDER — LOSARTAN POTASSIUM 25 MG/1
25 TABLET ORAL ONCE
Status: COMPLETED | OUTPATIENT
Start: 2022-12-12 | End: 2022-12-12

## 2022-12-12 RX ADMIN — LABETALOL HYDROCHLORIDE 10 MG: 5 INJECTION, SOLUTION INTRAVENOUS at 22:54

## 2022-12-12 RX ADMIN — LORAZEPAM 0.5 MG: 0.5 TABLET ORAL at 23:15

## 2022-12-12 RX ADMIN — LOSARTAN POTASSIUM 25 MG: 25 TABLET, FILM COATED ORAL at 17:01

## 2022-12-12 RX ADMIN — NICOTINE 1 PATCH: 14 PATCH, EXTENDED RELEASE TRANSDERMAL at 08:50

## 2022-12-12 RX ADMIN — LOSARTAN POTASSIUM 25 MG: 25 TABLET, FILM COATED ORAL at 08:50

## 2022-12-12 RX ADMIN — MORPHINE SULFATE 2 MG: 2 INJECTION, SOLUTION INTRAMUSCULAR; INTRAVENOUS at 00:59

## 2022-12-12 RX ADMIN — TAMSULOSIN HYDROCHLORIDE 0.4 MG: 0.4 CAPSULE ORAL at 17:01

## 2022-12-12 RX ADMIN — IOHEXOL 100 ML: 350 INJECTION, SOLUTION INTRAVENOUS at 15:32

## 2022-12-12 NOTE — ASSESSMENT & PLAN NOTE
Reports increased SOB and FITZPATRICK over the past 3 weeks  Poor exercise tolerance  Denies chest pain, cough, wheezing, orthopnea, PND, edema  · COVID/flu/RSV negative   · CXR: No acute cardiopulmonary disease  · Echo: EF 60%  Normal systolic/diastolic function  · HS Trop: 3->3  · Albuterol p r n    · Recommend PFTs at discharge r/o underlying obstructive lung dz w/ tobacco use  · Continue to monitor respiratory status

## 2022-12-12 NOTE — ASSESSMENT & PLAN NOTE
· Complains of urinary hesitancy and intermittency  · UA negative   · Continue Flomax  · Monitor on urinary retention protocol

## 2022-12-12 NOTE — ASSESSMENT & PLAN NOTE
· Na 129  · Am Cortisol/TSH: Normal  · Urine Os: 729/ Urine Na: 73  · Will initiate FR  · Continue to monitor while admitted

## 2022-12-12 NOTE — ASSESSMENT & PLAN NOTE
· Reports 120lbs weight gain over past 2 months  · Denies change in diet/exercise     · TSH: WNL   · Am cortisol/HgbA1: WNL  · lipid panel: Cholesterol 183/Triglycerides 280/ HDL 45/

## 2022-12-12 NOTE — PROGRESS NOTES
[facility]  68   Progress Note - Jeffy Gutierrez 1985, 30 y o  male MRN: [id number]  Unit/Bed#: -01 Encounter: [id number]  Primary Care Provider: Harish Reardon MD   Date and time admitted to hospital: 12/10/2022  5:18 PM    * Shortness of breath  Assessment & Plan  Reports increased SOB and FITZPATRICK over the past 3 weeks  Poor exercise tolerance  Denies chest pain, cough, wheezing, orthopnea, PND, edema  · COVID/flu/RSV negative   · CXR: No acute cardiopulmonary disease  · Echo: EF 60%  Normal systolic/diastolic function  · HS Trop: 3->3  · Albuterol p r n  · Recommend PFTs at discharge r/o underlying obstructive lung dz w/ tobacco use  · Continue to monitor respiratory status    Retroperitoneal lymphadenopathy  Assessment & Plan  · Reports night sweats, unexplained weight gain, early satiety, abdominal pain  · Hx of testicular mass not improved with abx treatment and patient did not follow-up with urology     · CT A/P: Retroperitoneal lymphadenopathy concerning for malignant process, specifically metastatic testicular cancer given the findings on prior scrotal ultrasound  · CTA Chest: Nothing to indicate metastatic disease  · AFP/ LDH: WNL  · [place] Tumor Marker: Pending  · Scrotal/testicular ultrasound: Ordered  · Urology Consulted:   · Follow up HCG tumor marker  · Anticipate IR consult for lymph node biopsy pending U/S and tumor marker results    Hypertension  Assessment & Plan  · With accelerated hypertension   · No prior diagnosis of hypertension  · Continue with losartan-> Will increase to 50 mg Daily  · Consider adding Amlodipine  · C/w IV Labetalol PRN   · Stress/Anxiety likely contributing  · Continue to monitor BP closely    Urinary hesitancy  Assessment & Plan  · Complains of urinary hesitancy and intermittency  · UA negative   · Continue Flomax  · Monitor on urinary retention protocol     Unexplained weight gain  Assessment & Plan  · Reports 120lbs weight gain over past 2 months  · Denies change in diet/exercise  · TSH: WNL   · Am cortisol/HgbA1: WNL  · lipid panel: Cholesterol 183/Triglycerides 280/ HDL 45/     Hyponatremia  Assessment & Plan  · Na 129  · Am Cortisol/TSH: Normal  · Urine Os: 729/ Urine Na: 73  · Will initiate FR  · Continue to monitor while admitted    Tobacco use  Assessment & Plan  · 1ppd x 15 years  · Nicotine patch ordered  · Educated on smoking cessation      VTE Pharmacologic Prophylaxis: VTE Score: 1 Low Risk (Score 0-2) - Encourage Ambulation  Patient Centered Rounds: I performed bedside rounds with nursing staff today  Discussions with Specialists or Other Care Team Provider: Urology, CM    Education and Discussions with Family / Patient: Updated  (mother) at bedside  Time Spent for Care: 30 minutes  More than 50% of total time spent on counseling and coordination of care as described above  Current Length of Stay: 2 day(s)  Current Patient Status: Inpatient   Certification Statement: The patient will continue to require additional inpatient hospital stay due to Normal scrotal ultrasound requiring further evaluation, urology consultation and lymphadenopathy requiring potential IR consultation and biopsy  Discharge Plan: Anticipate discharge in 48-72 hrs to home  Code Status: Level 1 - Full Code    Subjective:   Very anxious and tearful  Patient stated provider in the ER told him he had cancer  Patient denies any active chest pain, shortness of breath, abdominal pain, nausea, vomiting, or diarrhea  Objective:     Vitals:   Temp (24hrs), Av °F (36 7 °C), Min:97 6 °F (36 4 °C), Max:98 5 °F (36 9 °C)    Temp:  [97 6 °F (36 4 °C)-98 5 °F (36 9 °C)] 98 5 °F (36 9 °C)  HR:  [] 109  Resp:  [16-18] 16  BP: (149-180)/() 171/114  SpO2:  [96 %-100 %] 96 %  Body mass index is 36 15 kg/m²  Input and Output Summary (last 24 hours):      Intake/Output Summary (Last 24 hours) at 2022 1650  Last data filed at 12/12/2022 0601  Gross per 24 hour   Intake 250 ml   Output --   Net 250 ml       Physical Exam:   Physical Exam  Vitals and nursing note reviewed  Constitutional:       General: He is not in acute distress  HENT:      Head: Normocephalic  Nose: Nose normal  No congestion  Mouth/Throat:      Mouth: Mucous membranes are moist       Pharynx: Oropharynx is clear  Cardiovascular:      Rate and Rhythm: Normal rate and regular rhythm  Pulses: Normal pulses  Heart sounds: No murmur heard  Pulmonary:      Effort: Pulmonary effort is normal  No respiratory distress  Abdominal:      General: Bowel sounds are normal  There is distension  Palpations: Abdomen is soft  Tenderness: There is no abdominal tenderness  Musculoskeletal:         General: Normal range of motion  Cervical back: Normal range of motion  Right lower leg: No edema  Left lower leg: No edema  Skin:     Capillary Refill: Capillary refill takes less than 2 seconds  Neurological:      Mental Status: He is alert and oriented to person, place, and time  Mental status is at baseline  Motor: No weakness  Psychiatric:         Mood and Affect: Mood is anxious  Affect is tearful  Speech: Speech normal          Behavior: Behavior is cooperative            Additional Data:     Labs:  Results from last 7 days   Lab Units 12/12/22 0434 12/11/22 0444   WBC Thousand/uL 9 71 9 50   HEMOGLOBIN g/dL 14 2 14 0   HEMATOCRIT % 40 0 39 4   PLATELETS Thousands/uL 274 276   NEUTROS PCT %  --  62   LYMPHS PCT %  --  27   MONOS PCT %  --  7   EOS PCT %  --  2     Results from last 7 days   Lab Units 12/12/22 0434 12/11/22 0444 12/10/22  1753   SODIUM mmol/L 129*   < > 129*   POTASSIUM mmol/L 3 9   < > 3 6   CHLORIDE mmol/L 94*   < > 92*   CO2 mmol/L 25   < > 29   BUN mg/dL 10   < > 13   CREATININE mg/dL 0 76   < > 0 88   ANION GAP mmol/L 10   < > 8   CALCIUM mg/dL 9 4   < > 9 4   ALBUMIN g/dL  --   -- 4  3   TOTAL BILIRUBIN mg/dL  --   --  0 34   ALK PHOS U/L  --   --  92   ALT U/L  --   --  51   AST U/L  --   --  23   GLUCOSE RANDOM mg/dL 89   < > 86    < > = values in this interval not displayed  Results from last 7 days   Lab Units 12/10/22  1753   INR  0 88         Results from last 7 days   Lab Units 12/10/22  1753   HEMOGLOBIN A1C % 5 2     Results from last 7 days   Lab Units 12/10/22  1753   LACTIC ACID mmol/L 0 9       Lines/Drains:  Invasive Devices     Peripheral Intravenous Line  Duration           Peripheral IV 12/10/22 Right Antecubital 1 day                  Telemetry:  Telemetry Orders (From admission, onward)             48 Hour Telemetry Monitoring  Continuous x 48 hours        References:    Telemetry Guidelines   Question:  Reason for 48 Hour Telemetry  Answer:  Arrhythmias Requiring Medical Therapy (eg  SVT, Vtach/fib, Bradycardia, Uncontrolled A-fib)                 Telemetry Reviewed: Normal Sinus Rhythm  Indication for Continued Telemetry Use: No indication for continued use  Will discontinue                Imaging: Reviewed radiology reports from this admission including: chest xray, chest CT scan, abdominal/pelvic CT, ultrasound(s) and ECHO    Recent Cultures (last 7 days):         Last 24 Hours Medication List:   Current Facility-Administered Medications   Medication Dose Route Frequency Provider Last Rate   • acetaminophen  650 mg Oral Q6H PRN Suzan Hewitt PA-C     • albuterol  2 5 mg Nebulization Q6H PRN Suzan Hewitt PA-C     • labetalol  10 mg Intravenous Q6H PRN Mai Hylton MD     • losartan  25 mg Oral Once AMBER Mathews     • [START ON 12/13/2022] losartan  50 mg Oral Daily AMBER Mathews     • morphine injection  2 mg Intravenous Q4H PRN Mai Hylton MD     • nicotine  1 patch Transdermal Daily Suzan Hewitt PA-C     • tamsulosin  0 4 mg [facility] Daily [facility], Patient Was Seen By: AMBER Hunt    **Please Note: This note may have been constructed using a voice recognition system  **

## 2022-12-12 NOTE — ASSESSMENT & PLAN NOTE
· With accelerated hypertension   · No prior diagnosis of hypertension  · Continue with losartan-> Will increase to 50 mg Daily  · Consider adding Amlodipine  · C/w IV Labetalol PRN   · Stress/Anxiety likely contributing  · Continue to monitor BP closely

## 2022-12-12 NOTE — CONSULTS
Consultation - Urology  Rockford Krabbe 30 y o  male MRN: [id number]  Unit/Bed#: -01 Encounter: [id number]            ASSESSMENT:  Patient is a 30year old male with PMH of bipolar disorder that presented to the ED with increased shortness of breath and significant weight gain  Patient noted to have retroperitoneal lymphadenopathy on CTAP concerning for metastatic process  CTAP: Retroperitoneal lymphadenopathy which is concerning for a malignant process, specifically metastatic testicular cancer given the findings on prior scrotal ultrasound  Finding discussed with Dr Welch Comment at the time of this dictation  Hepatic steatosis    Nonspecific fat stranding along the right common and external iliac vessels, of uncertain etiology  Plan:  - Scrotal ultrasound  - CT chest with IV contrast  - obtain hCG tumor marker  - anticipate IR consult for lymph node biopsy pending US and tumor marker results      Reason for Consult / Principal Problem:    HPI: Rockford Krabbe is a 30y o  year old male with PMH of bipolar disorder that presented to the ED with complaints of increasing SOB, weight gain, and left-sided abdominal pain  Patient states that he noted left testicular pain and swelling in 2019  Patient states that he was incarcerated at that time so did not have medical evaluation  Patient states that the testicular pain would come and go but the swelling and enlarging of the testicle increased over time  He states that he had an ultrasound performed of the testicle in 2021  Ultrasound at that time demonstrated "enlarged diffusely heterogeneous abnormal left testicle with infiltrated appearance in some areas which may show early cystic changes  Marked hypervascularity  Findings suspicious for neoplastic etiology "  Patient was started on antibiotics and instructed to follow-up with urology  Unfortunately, patient did not follow-up because he didn't have health insurance   Patient states that he has increasing enlargement of the testicle since that time  He states that about 3 weeks ago, he noticed significant weight gain, abdominal bloating, and left-sided abdominal pain  Patient states that the abdominal pain had increased and motivated him to be evaluated in the ED  Patient states that he has been having increasing shortness of breath with the weight gain as well  Patient states that he is able to urinate without difficulty, but has not had an erection in 8 months  Review of Systems   Constitutional: Positive for unexpected weight change  Negative for fever  HENT: Negative for congestion  Respiratory: Positive for shortness of breath  Cardiovascular: Negative for chest pain  Gastrointestinal: Positive for abdominal distention and abdominal pain  Negative for nausea and vomiting  Genitourinary: Positive for testicular pain  Negative for difficulty urinating  Neurological: Negative for dizziness  All other systems reviewed and are negative  Historical Information   Past Medical History:   Diagnosis Date   • Depression    • Psychiatric disorder     bipolar     History reviewed  No pertinent surgical history  Social History   Social History     Substance and Sexual Activity   Alcohol Use Not Currently    Comment: 2 cases of beer daily      Social History     Substance and Sexual Activity   Drug Use Yes   • Types: Marijuana     Social History     Tobacco Use   Smoking Status Every Day   • Packs/day: 1 00   • Types: Cigarettes   Smokeless Tobacco Never     History reviewed  No pertinent family history      Meds/Allergies     Medications Prior to Admission   Medication   • cyclobenzaprine (FLEXERIL) 10 mg tablet   • gabapentin (NEURONTIN) 300 mg capsule   • gabapentin (Neurontin) 300 mg capsule   • naproxen (NAPROSYN) 500 mg tablet   • predniSONE 20 mg tablet     Current Facility-Administered Medications   Medication Dose Route Frequency   • acetaminophen (TYLENOL) tablet 650 mg  650 mg Oral Q6H PRN   • albuterol inhalation solution 2 5 mg  2 5 mg Nebulization Q6H PRN   • labetalol (NORMODYNE) injection 10 mg  10 mg Intravenous Q6H PRN   • losartan (COZAAR) tablet 25 mg  25 mg Oral Daily   • morphine injection 2 mg  2 mg Intravenous Q4H PRN   • nicotine (NICODERM CQ) 14 mg/24hr TD 24 hr patch 1 patch  1 patch Transdermal Daily   • tamsulosin (FLOMAX) capsule 0 4 mg  0 4 mg Oral Daily With Dinner       No Known Allergies    Objective     Blood pressure (!) 149/112, pulse 100, temperature 97 8 °F (36 6 °C), temperature source Oral, resp  rate 16, height 6' 4" (1 93 m), weight 135 kg (297 lb), SpO2 99 %  Intake/Output Summary (Last 24 hours) at 12/12/2022 1237  Last data filed at 12/12/2022 0601  Gross per 24 hour   Intake 250 ml   Output --   Net 250 ml       PHYSICAL EXAM  Physical Exam  Vitals reviewed  Exam conducted with a chaperone present  Constitutional:       Appearance: Normal appearance  HENT:      Head: Normocephalic and atraumatic  Right Ear: External ear normal       Left Ear: External ear normal       Ears:      Comments: Hard of hearing     Nose: Nose normal       Mouth/Throat:      Mouth: Mucous membranes are moist    Eyes:      Extraocular Movements: Extraocular movements intact  Conjunctiva/sclera: Conjunctivae normal       Pupils: Pupils are equal, round, and reactive to light  Cardiovascular:      Rate and Rhythm: Normal rate and regular rhythm  Pulses: Normal pulses  Heart sounds: Normal heart sounds  Abdominal:      General: There is distension  Palpations: Abdomen is soft  There is no fluid wave  Tenderness: There is abdominal tenderness in the left upper quadrant and left lower quadrant  Hernia: A hernia is present  Hernia is present in the umbilical area  Genitourinary:     Penis: Normal        Testes:         Left: Mass present  Tenderness not present  Musculoskeletal:         General: Normal range of motion     Skin: General: Skin is warm  Capillary Refill: Capillary refill takes less than 2 seconds  Neurological:      General: No focal deficit present  Mental Status: He is alert and oriented to person, place, and time  Psychiatric:         Mood and Affect: Mood normal          Behavior: Behavior normal          Thought Content: Thought content normal          Judgment: Judgment normal            Lab Results: I have personally reviewed pertinent lab results  Imaging: I have personally reviewed pertinent reports      Macrina Rees PA-C  12/12/2022 12:37 PM

## 2022-12-12 NOTE — ASSESSMENT & PLAN NOTE
· Reports night sweats, unexplained weight gain, early satiety, abdominal pain  · Hx of testicular mass not improved with abx treatment and patient did not follow-up with urology     · CT A/P: Retroperitoneal lymphadenopathy concerning for malignant process, specifically metastatic testicular cancer given the findings on prior scrotal ultrasound  · CTA Chest: Nothing to indicate metastatic disease  · AFP/ LDH: WNL  · [place] Tumor Marker: Pending  · Scrotal/testicular ultrasound: Ordered  · Urology Consulted:   · Follow up HCG tumor marker  · Anticipate IR consult for lymph node biopsy pending U/S and tumor marker results

## 2022-12-12 NOTE — PLAN OF CARE
Problem: PAIN - ADULT  Goal: Verbalizes/displays adequate comfort level or baseline comfort level  Description: Interventions:  - Encourage patient to monitor pain and request assistance  - Assess pain using appropriate pain scale  - Administer analgesics based on type and severity of pain and evaluate response  - Implement non-pharmacological measures as appropriate and evaluate response  - Consider cultural and social influences on pain and pain management  - Notify physician/advanced practitioner if interventions unsuccessful or patient reports new pain  Outcome: Progressing     Problem: INFECTION - ADULT  Goal: Absence or prevention of progression during hospitalization  Description: INTERVENTIONS:  - Assess and monitor for signs and symptoms of infection  - Monitor lab/diagnostic results  - Monitor all insertion sites, i e  indwelling lines, tubes, and drains  - Monitor endotracheal if appropriate and nasal secretions for changes in amount and color  - [place] appropriate cooling/warming therapies per order  - Administer medications as ordered  - Instruct and encourage patient and family to use good hand hygiene technique  - Identify and instruct in appropriate isolation precautions for identified infection/condition  Outcome: Progressing  Goal: Absence of fever/infection during neutropenic period  Description: INTERVENTIONS:  - Monitor WBC    Outcome: Progressing     Problem: SAFETY ADULT  Goal: Patient will remain free of falls  Description: INTERVENTIONS:  - Educate patient/family on patient safety including physical limitations  - Instruct patient to call for assistance with activity   - Consult OT/PT to assist with strengthening/mobility   - Keep Call bell within reach  - Keep bed low and locked with side rails adjusted as appropriate  - Keep care items and personal belongings within reach  - Initiate and maintain comfort rounds  - Make Fall Risk Sign visible to staff  - Offer Toileting every 2 Hours, in advance of need  - Initiate/Maintain bed alarm  - Obtain necessary fall risk management equipment:   - Apply yellow socks and bracelet for high fall risk patients  - Consider moving patient to room near nurses station  Outcome: Progressing  Goal: Maintain or return to baseline ADL function  Description: INTERVENTIONS:  -  Assess patient's ability to carry out ADLs; assess patient's baseline for ADL function and identify physical deficits which impact ability to perform ADLs (bathing, care of mouth/teeth, toileting, grooming, dressing, etc )  - Assess/evaluate cause of self-care deficits   - Assess range of motion  - Assess patient's mobility; develop plan if impaired  - Assess patient's need for assistive devices and provide as appropriate  - Encourage maximum independence but intervene and supervise when necessary  - Involve family in performance of ADLs  - Assess for home care needs following discharge   - Consider OT consult to assist with ADL evaluation and planning for discharge  - Provide patient education as appropriate  Outcome: Progressing  Goal: Maintains/Returns to pre admission functional level  Description: INTERVENTIONS:  - Perform BMAT or MOVE assessment daily    - Set and communicate daily mobility goal to care team and patient/family/caregiver  - Collaborate with rehabilitation services on mobility goals if consulted  - Perform Range of Motion 3 times a day  - Reposition patient every 2 hours    - Dangle patient 3 times a day  - Stand patient 3 times a day  - Ambulate patient 3 times a day  - Out of bed to chair 3 times a day   - Out of bed for meals 3 times a day  - Out of bed for toileting  - Record patient progress and toleration of activity level   Outcome: Progressing     Problem: DISCHARGE PLANNING  Goal: Discharge to home or other facility with appropriate resources  Description: INTERVENTIONS:  - Identify barriers to discharge w/patient and caregiver  - Arrange for needed discharge resources and transportation as appropriate  - Identify discharge learning needs (meds, wound care, etc )  - Arrange for interpretive services to assist at discharge as needed  - Refer to Case Management Department for coordinating discharge planning if the patient needs post-hospital services based on physician/advanced practitioner order or complex needs related to functional status, cognitive ability, or social support system  Outcome: Progressing     Problem: Knowledge Deficit  Goal: Patient/family/caregiver demonstrates understanding of disease process, treatment plan, medications, and discharge instructions  Description: Complete learning assessment and assess knowledge base    Interventions:  - Provide teaching at level of understanding  - Provide teaching via preferred learning methods  Outcome: Progressing     Problem: Potential for Falls  Goal: Patient will remain free of falls  Description: INTERVENTIONS:  - Educate patient/family on patient safety including physical limitations  - Instruct patient to call for assistance with activity   - Consult OT/PT to assist with strengthening/mobility   - Keep Call bell within reach  - Keep bed low and locked with side rails adjusted as appropriate  - Keep care items and personal belongings within reach  - Initiate and maintain comfort rounds  - Make Fall Risk Sign visible to staff  - Offer Toileting every 2 Hours, in advance of need  - Initiate/Maintain bed alarm  - Obtain necessary fall risk management equipment:   - Apply yellow socks and bracelet for high fall risk patients  - Consider moving patient to room near nurses station  Outcome: Progressing

## 2022-12-12 NOTE — UTILIZATION REVIEW
Initial Clinical Review    Admission: Date/Time/Statement:   Admission Orders (From admission, onward)     Ordered        12/10/22 2044  INPATIENT ADMISSION  Once                      Orders Placed This Encounter   Procedures   • INPATIENT ADMISSION     Standing Status:   Standing     Number of Occurrences:   1     Order Specific Question:   Level of Care     Answer:   Med Surg [16]     Order Specific Question:   Estimated length of stay     Answer:   More than 2 Midnights     Order Specific Question:   Certification     Answer:   I certify that inpatient services are medically necessary for this patient for a duration of greater than two midnights  See H&P and MD Progress Notes for additional information about the patient's course of treatment  ED Arrival Information     Expected   -    Arrival   12/10/2022 17:08    Acuity   Urgent            Means of arrival   Walk-In    Escorted by   [place]    Service   Hospitalist    Admission type   Emergency            Arrival complaint   [facility] PAIN           Chief Complaint   Patient presents with   • Flank Pain     "I think I have a thyroid problem I gained 120 pounds in less than two months" L sided abdominal pain, x3 weeks       Initial Presentation: 30 y o  male with a pmh of testicular mass, he presents to the Ed with SOB, FITZPATRICK and weight gain of 120 lbs over the past 2 months  He reports increasing SOB over the past 2-3 weeks  He reports he is easily fatigued and dyspneic with walking short distances  He is a 1 PPD smoker  He denies any change on diet or exercise  He complains of early satiety, night sweats and LUQ abdominal pain, along with urinary hesitancy and intermittency  In the ED he was notes to he hyponatremia, CT abd & Pelvis revealed retroperitoneal adenopathy with concern for metastatic testicular cancer  He is admitted inpatient due to SOB, retroperitoneal lymphadenopathy and  hyponatremia       Date: 12/11/22   Day 2:  Pt with testicular Mass,  suspicion for malignancy, plan for repeat ultrasound of the testicle, Ultrasound from 2021 was reviewed, showed abnormal changes in the left testicle suspicious for neoplastic etiology  CT abd showed retroperitoneal lymphadenopathy  He has not followed up since 2021 regarding scrotal swelling  He may require Urology interventions  Hyponatremia likely related to dehydration  CT abd has no evidence of ascites, Abdominal distention with unexplained weight gain of 120 pounds       ED Triage Vitals [12/10/22 1717]   Temperature Pulse Respirations Blood Pressure SpO2   (!) 97 4 °F (36 3 °C) (!) 112 (!) 28 (!) 179/120 98 %      Temp Source Heart Rate Source Patient Position - Orthostatic VS BP Location FiO2 (%)   Oral Monitor Sitting Left arm --      Pain Score       7          Wt Readings from Last 1 Encounters:   12/12/22 135 kg (297 lb)     Additional Vital Signs:  Date/Time Temp Pulse Resp BP MAP (mmHg) SpO2 O2 Device Patient Position - Orthostatic VS   12/12/22 1233 -- 100 -- 149/112 Abnormal  -- -- -- --   12/12/22 1115 97 8 °F (36 6 °C) 100 16 149/112 Abnormal  -- 99 % -- Sitting   12/12/22 0747 97 9 °F (36 6 °C) 102 16 174/112 Abnormal  -- 100 % -- Sitting   12/12/22 0424 98 2 °F (36 8 °C) 99 18 180/103 Abnormal  124 -- -- Lying   12/12/22 0100 97 7 °F (36 5 °C) 100 18 156/103 Abnormal  126 100 % None (Room air) Lying   12/11/22 2314 97 6 °F (36 4 °C) 100 18 161/94 126 97 % None (Room air) Lying   12/11/22 2000 -- -- -- -- -- 97 % None (Room air) --   12/11/22 1902 98 3 °F (36 8 °C) 104 18 158/118 Abnormal   134  98 % None (Room air) Lying   BP: reported to MAXIMINO Cabello at 56/01/51 3824   MAP (mmHg): reported to MAXIMINO Cabello at 45/04/22 6214   12/11/22 1648 -- -- -- 155/107 Abnormal  -- -- -- Lying   12/11/22 1506 98 2 °F (36 8 °C) 92 18 155/99 121 98 % None (Room air) Lying   12/11/22 1400 -- -- -- 157/101 Abnormal  129 -- -- Lying   12/11/22 0900 -- -- -- -- -- -- None (Room air) --   12/11/22 0847 97 8 °F (36 6 °C) 99 18 180/108 Abnormal  -- -- -- Lying   12/11/22 0446 98 4 °F (36 9 °C) 98 18 164/96 118 98 % None (Room air) Lying   12/11/22 0148 -- -- -- 184/104 Abnormal  -- -- -- --   12/11/22 0110 98 °F (36 7 °C) 97 18 168/102 Abnormal  127 -- -- Lying   12/10/22 2337 98 1 °F (36 7 °C) 98 18 161/107 Abnormal  127 97 % None (Room air) Lying   12/10/22 2133 97 4 °F (36 3 °C) Abnormal  97 16 166/105 Abnormal   -- 98 % -- Lying   BP: Rn Vera notified at 12/10/22 2133   12/10/22 1835 -- 102 18 163/102 Abnormal  -- 95 % None (Room air) Sitting           Pertinent Labs/Diagnostic Test Results:   CT abdomen pelvis with contrast   Final Result by Adrian Lacy MD (12/10 2221)      Retroperitoneal lymphadenopathy which is concerning for a malignant process, specifically metastatic testicular cancer given the findings on prior scrotal ultrasound  Finding discussed with Dr Leighann Burt at the time of this dictation  Hepatic steatosis  Nonspecific fat stranding along the right common and external iliac vessels, of uncertain etiology  Workstation performed: [id number]         XR chest 1 view portable   Final Result by Vita Alves MD (12/11 0820)      No acute cardiopulmonary disease  Workstation performed: [id number]         US scrotum and testicles    - 12/12   VAS lower limb venous duplex study, complete bilateral   12/12 - Impression:  RIGHT LOWER LIMB:  No evidence of acute or chronic deep vein thrombosis  No evidence of superficial thrombophlebitis noted  Doppler evaluation shows a normal response to augmentation maneuvers  Popliteal, posterior tibial and peroneal arterial Doppler waveforms are  triphasic  LEFT LOWER LIMB:  No evidence of acute or chronic deep vein thrombosis  No evidence of superficial thrombophlebitis noted  Doppler evaluation shows a normal response to augmentation maneuvers    Popliteal, posterior tibial and peroneal arterial Doppler waveforms are  triphasic  ECG - 12/10 -  Normal sinus rhythm  Cannot rule out Anteroseptal infarct , age undetermined  Abnormal ECG  No previous ECGs available     ECHO  -  12/10 -   •  Left Ventricle: Left ventricular cavity size is normal  Wall thickness is mildly increased  There is concentric remodeling  The left ventricular ejection fraction is 60%   Systolic function is normal  Wall motion is normal  Diastolic function is normal   •  Right Ventricle: Right ventricular cavity size is normal  Systolic function is normal          CTA chest PE study - 12/12 - pending     Results from last 7 days   Lab Units 12/10/22  2246   SARS-COV-2  Negative     Results from last 7 days   Lab Units 12/12/22  0434 12/11/22  0444 12/10/22  1753   WBC Thousand/uL 9 71 9 50 10 46*   HEMOGLOBIN g/dL 14 2 14 0 14 6   HEMATOCRIT % 40 0 39 4 41 1   PLATELETS Thousands/uL 274 276 293   NEUTROS ABS Thousands/µL  --  5 88 6 66         Results from last 7 days   Lab Units 12/12/22  0434 12/11/22  0444 12/10/22  1753   SODIUM mmol/L 129* 131* 129*   POTASSIUM mmol/L 3 9 3 5 3 6   CHLORIDE mmol/L 94* 97 92*   CO2 mmol/L 25 24 29   ANION GAP mmol/L 10 10 8   BUN mg/dL 10 12 13   CREATININE mg/dL 0 76 0 73 0 88   EGFR ml/min/1 73sq m 118 120 111   CALCIUM mg/dL 9 4 8 9 9 4   MAGNESIUM mg/dL  --   --  2 0   PHOSPHORUS mg/dL  --   --  3 3     Results from last 7 days   Lab Units 12/10/22  1753   AST U/L 23   ALT U/L 51   ALK PHOS U/L 92   TOTAL PROTEIN g/dL 7 4   ALBUMIN g/dL 4 3   TOTAL BILIRUBIN mg/dL 0 34   BILIRUBIN DIRECT mg/dL 0 07         Results from last 7 days   Lab Units 12/12/22  0434 12/11/22  0444 12/10/22  1753   GLUCOSE RANDOM mg/dL 89 91 86         Results from last 7 days   Lab Units 12/10/22  1753   HEMOGLOBIN A1C % 5 2   EAG mg/dl 103       Results from last 7 days   Lab Units 12/11/22  0044 12/10/22  2243   HS TNI 0HR ng/L  --  3   HS TNI 2HR ng/L 3  --    HSTNI D2 ng/L 0  --      Results from last 7 days   Lab Units 12/11/22 1957   D-DIMER QUANTITATIVE ug/ml FEU <0 27     Results from last 7 days   Lab Units 12/10/22  1753   PROTIME seconds 12 3   INR  0 88   PTT seconds 25     Results from last 7 days   Lab Units 12/10/22  1753   TSH 3RD GENERATON uIU/mL 1 295       Results from last 7 days   Lab Units 12/10/22  1753   LACTIC ACID mmol/L 0 9       Results from last 7 days   Lab Units 12/10/22  1753   BNP pg/mL 7       Results from last 7 days   Lab Units 12/10/22  1753   LIPASE u/L 40       Results from last 7 days   Lab Units 12/11/22  0007   OSMO UR mmol/     Results from last 7 days   Lab Units 12/11/22  0007 12/10/22  1753   CLARITY UA   --  Clear   COLOR UA   --  Yellow   SPEC GRAV UA   --  1 020   PH UA   --  6 0   GLUCOSE UA mg/dl  --  Negative   KETONES UA mg/dl  --  Negative   BLOOD UA   --  Negative   PROTEIN UA mg/dl  --  Negative   NITRITE UA   --  Negative   BILIRUBIN UA   --  Negative   UROBILINOGEN UA E U /dl  --  0 2   LEUKOCYTES UA   --  Negative   SODIUM UR  73  --      Results from last 7 days   Lab Units 12/10/22  2246   INFLUENZA A PCR  Negative   INFLUENZA B PCR  Negative   RSV PCR  Negative       ED Treatment:   Medication Administration from 12/10/2022 1707 to 12/10/2022 2123       Date/Time Order Dose Route Action Comments     12/10/2022 1922 EST iohexol (OMNIPAQUE) 350 MG/ML injection (SINGLE-DOSE) 100 mL 100 mL Intravenous Given --        Past Medical History:   Diagnosis Date   • Depression    • Psychiatric disorder     bipolar     Present on Admission:  **None**      Admitting Diagnosis: SOB (shortness of breath) [R06 02]  Flank pain [R10 9]  Retroperitoneal lymphadenopathy [R59 0]  Age/Sex: 30 y o  male       Admission Orders:  Scheduled Medications:  losartan, 25 mg, Oral, Daily  nicotine, 1 patch, Transdermal, Daily  tamsulosin, 0 4 mg, Oral, Daily With Dinner      Continuous IV Infusions:    multi-electrolyte (PLASMALYTE-A/ISOLYTE-S PH 7 4) IV solution  Rate: 75 mL/hr Dose: 75 mL/hr  Freq: Continuous Route: IV  Last Dose: Stopped (12/12/22 0919)  Start: 12/11/22 1745 End: 12/12/22 0807        PRN Meds:  Hydralazine 5 mg iv prn - 12/11 x 2   acetaminophen, 650 mg, Oral, Q6H PRN  albuterol, 2 5 mg, Nebulization, Q6H PRN  labetalol, 10 mg, Intravenous, Q6H PRN  morphine injection, 2 mg, Intravenous, Q4H PRN-12/12 x 1       Nursing Orders - VS - Telem - daily weights - I & O q shift- activity as tolerated - Diet cardiac - fluid restriction 1800 ml     Network Utilization Review Department  ATTENTION: Please call with any questions or concerns to [phone] and carefully listen to the prompts so that you are directed to the right person  All voicemails are confidential   Zettguilherme Diepuma all requests for admission clinical reviews, approved or denied determinations and any other requests to dedicated fax number below belonging to the campus where the patient is receiving treatment   List of dedicated fax numbers for the Facilities:  [address] DENIALS (Administrative/Medical Necessity) [phone]   [address] (Maternity/NICU/Pediatrics) [phone]   [address] [phone]   [facility]  [phone]   [address] [address] [facility] [place] [place] [address] [address] 28 [phone]   [address] [facility] [place] [address] [address] [facility] [phone]

## 2022-12-12 NOTE — PLAN OF CARE
Problem: PAIN - ADULT  Goal: Verbalizes/displays adequate comfort level or baseline comfort level  Description: Interventions:  - Encourage patient to monitor pain and request assistance  - Assess pain using appropriate pain scale  - Administer analgesics based on type and severity of pain and evaluate response  - Implement non-pharmacological measures as appropriate and evaluate response  - Consider cultural and social influences on pain and pain management  - Notify physician/advanced practitioner if interventions unsuccessful or patient reports new pain  12/12/2022 0758 by Ev Zaman RN  Outcome: Progressing  12/12/2022 0758 by Ev Zaman RN  Outcome: Progressing     Problem: INFECTION - ADULT  Goal: Absence or prevention of progression during hospitalization  Description: INTERVENTIONS:  - Assess and monitor for signs and symptoms of infection  - Monitor lab/diagnostic results  - Monitor all insertion sites, i e  indwelling lines, tubes, and drains  - Monitor endotracheal if appropriate and nasal secretions for changes in amount and color  - [place] appropriate cooling/warming therapies per order  - Administer medications as ordered  - Instruct and encourage patient and family to use good hand hygiene technique  - Identify and instruct in appropriate isolation precautions for identified infection/condition  12/12/2022 0758 by Ev Zaman RN  Outcome: Progressing  12/12/2022 0758 by Ev Zaman RN  Outcome: Progressing     Problem: SAFETY ADULT  Goal: Patient will remain free of falls  Description: INTERVENTIONS:  - Educate patient/family on patient safety including physical limitations  - Instruct patient to call for assistance with activity   - Consult OT/PT to assist with strengthening/mobility   - Keep Call bell within reach  - Keep bed low and locked with side rails adjusted as appropriate  - Keep care items and personal belongings within reach  - Initiate and maintain comfort rounds  - Make Fall Risk Sign visible to staff  - Offer Toileting every 2 Hours, in advance of need  - Initiate/Maintain alarm  - Obtain necessary fall risk management equipment:   - Apply yellow socks and bracelet for high fall risk patients  - Consider moving patient to room near nurses station  12/12/2022 0758 by Dee Anthony RN  Outcome: Progressing  12/12/2022 0758 by Dee Anthony RN  Outcome: Progressing  Goal: Maintain or return to baseline ADL function  Description: INTERVENTIONS:  -  Assess patient's ability to carry out ADLs; assess patient's baseline for ADL function and identify physical deficits which impact ability to perform ADLs (bathing, care of mouth/teeth, toileting, grooming, dressing, etc )  - Assess/evaluate cause of self-care deficits   - Assess range of motion  - Assess patient's mobility; develop plan if impaired  - Assess patient's need for assistive devices and provide as appropriate  - Encourage maximum independence but intervene and supervise when necessary  - Involve family in performance of ADLs  - Assess for home care needs following discharge   - Consider OT consult to assist with ADL evaluation and planning for discharge  - Provide patient education as appropriate  12/12/2022 0758 by Dee Anthony RN  Outcome: Progressing  12/12/2022 0758 by Dee Anthony RN  Outcome: Progressing  Goal: Maintains/Returns to pre admission functional level  Description: INTERVENTIONS:  - Perform BMAT or MOVE assessment daily    - Set and communicate daily mobility goal to care team and patient/family/caregiver  - Collaborate with rehabilitation services on mobility goals if consulted  - Perform Range of Motion 4 times a day  - Reposition patient every 2 hours    - Dangle patient 3 times a day  - Stand patient 3 times a day  - Ambulate patient 3 times a day  - Out of bed to chair 3 times a day   - Out of bed for meals 3 times a day  - Out of bed for toileting  - Record patient progress and toleration of activity level   12/12/2022 0758 by German Chou RN  Outcome: Progressing  12/12/2022 0758 by German Chou RN  Outcome: Progressing     Problem: DISCHARGE PLANNING  Goal: Discharge to home or other facility with appropriate resources  Description: INTERVENTIONS:  - Identify barriers to discharge w/patient and caregiver  - Arrange for needed discharge resources and transportation as appropriate  - Identify discharge learning needs (meds, wound care, etc )  - Arrange for interpretive services to assist at discharge as needed  - Refer to Case Management Department for coordinating discharge planning if the patient needs post-hospital services based on physician/advanced practitioner order or complex needs related to functional status, cognitive ability, or social support system  12/12/2022 0758 by German Chou RN  Outcome: Progressing  12/12/2022 0758 by German Chou RN  Outcome: Progressing     Problem: Knowledge Deficit  Goal: Patient/family/caregiver demonstrates understanding of disease process, treatment plan, medications, and discharge instructions  Description: Complete learning assessment and assess knowledge base    Interventions:  - Provide teaching at level of understanding  - Provide teaching via preferred learning methods  12/12/2022 0758 by German Chou RN  Outcome: Progressing  12/12/2022 0758 by German Chou RN  Outcome: Progressing     Problem: Potential for Falls  Goal: Patient will remain free of falls  Description: INTERVENTIONS:  - Educate patient/family on patient safety including physical limitations  - Instruct patient to call for assistance with activity   - Consult OT/PT to assist with strengthening/mobility   - Keep Call bell within reach  - Keep bed low and locked with side rails adjusted as appropriate  - Keep care items and personal belongings within reach  - Initiate and maintain comfort rounds  - Make Fall Risk Sign visible to staff  - Offer Toileting every 2 Hours, in advance of need  - Initiate/Maintain alarm  - Obtain necessary fall risk management equipment:   - Apply yellow socks and bracelet for high fall risk patients  - Consider moving patient to room near nurses station  12/12/2022 0758 by Wicho Gramajo RN  Outcome: Progressing  12/12/2022 0758 by Wicho Gramajo RN  Outcome: Progressing     Problem: RESPIRATORY - ADULT  Goal: Achieves optimal ventilation and oxygenation  Description: INTERVENTIONS:  - Assess for changes in respiratory status  - Assess for changes in mentation and behavior  - Position to facilitate oxygenation and minimize respiratory effort  - Oxygen administered by appropriate delivery if ordered  - Initiate smoking cessation education as indicated  - Encourage broncho-pulmonary hygiene including cough, deep breathe, Incentive Spirometry  - Assess the need for suctioning and aspirate as needed  - Assess and instruct to report SOB or any respiratory difficulty  - Respiratory Therapy support as indicated  Outcome: Progressing

## 2022-12-13 ENCOUNTER — HOSPITAL ENCOUNTER (OUTPATIENT)
Facility: HOSPITAL | Age: 37
Setting detail: SURGERY ADMIT
End: 2022-12-13
Attending: UROLOGY | Admitting: UROLOGY

## 2022-12-13 ENCOUNTER — HOSPITAL ENCOUNTER (INPATIENT)
Facility: HOSPITAL | Age: 37
LOS: 1 days | Discharge: HOME | End: 2022-12-14
Attending: UROLOGY | Admitting: STUDENT IN AN ORGANIZED HEALTH CARE EDUCATION/TRAINING PROGRAM

## 2022-12-13 ENCOUNTER — ANESTHESIA EVENT (INPATIENT)
Dept: PERIOP | Facility: HOSPITAL | Age: 37
End: 2022-12-13

## 2022-12-13 VITALS
WEIGHT: 295 LBS | TEMPERATURE: 98.4 F | BODY MASS INDEX: 35.92 KG/M2 | HEIGHT: 76 IN | OXYGEN SATURATION: 97 % | HEART RATE: 96 BPM | SYSTOLIC BLOOD PRESSURE: 165 MMHG | RESPIRATION RATE: 18 BRPM | DIASTOLIC BLOOD PRESSURE: 103 MMHG

## 2022-12-13 DIAGNOSIS — E87.1 HYPONATREMIA: ICD-10-CM

## 2022-12-13 DIAGNOSIS — N50.89 TESTICULAR MASS: Primary | ICD-10-CM

## 2022-12-13 DIAGNOSIS — R59.0 RETROPERITONEAL LYMPHADENOPATHY: ICD-10-CM

## 2022-12-13 LAB
ANION GAP SERPL CALCULATED.3IONS-SCNC: 9 MMOL/L (ref 4–13)
ANION GAP SERPL CALCULATED.3IONS-SCNC: 9 MMOL/L (ref 4–13)
BASOPHILS # BLD AUTO: 0.04 THOUSANDS/ÂΜL (ref 0–0.1)
BASOPHILS NFR BLD AUTO: 0 % (ref 0–1)
BUN SERPL-MCNC: 12 MG/DL (ref 5–25)
BUN SERPL-MCNC: 14 MG/DL (ref 5–25)
CALCIUM SERPL-MCNC: 9.1 MG/DL (ref 8.4–10.2)
CALCIUM SERPL-MCNC: 9.2 MG/DL (ref 8.4–10.2)
CHLORIDE SERPL-SCNC: 92 MMOL/L (ref 96–108)
CHLORIDE SERPL-SCNC: 94 MMOL/L (ref 96–108)
CO2 SERPL-SCNC: 24 MMOL/L (ref 21–32)
CO2 SERPL-SCNC: 24 MMOL/L (ref 21–32)
CREAT SERPL-MCNC: 0.77 MG/DL (ref 0.6–1.3)
CREAT SERPL-MCNC: 0.78 MG/DL (ref 0.6–1.3)
EOSINOPHIL # BLD AUTO: 0.14 THOUSAND/ÂΜL (ref 0–0.61)
EOSINOPHIL NFR BLD AUTO: 1 % (ref 0–6)
ERYTHROCYTE [DISTWIDTH] IN BLOOD BY AUTOMATED COUNT: 12.5 % (ref 11.6–15.1)
GFR SERPL CREATININE-BSD FRML MDRD: 116 ML/MIN/1.73SQ M
GFR SERPL CREATININE-BSD FRML MDRD: 117 ML/MIN/1.73SQ M
GLUCOSE SERPL-MCNC: 114 MG/DL (ref 65–140)
GLUCOSE SERPL-MCNC: 91 MG/DL (ref 65–140)
HCG-TM SERPL-SCNC: <2 MLU/ML
HCT VFR BLD AUTO: 39.3 % (ref 36.5–49.3)
HGB BLD-MCNC: 13.9 G/DL (ref 12–17)
IMM GRANULOCYTES # BLD AUTO: 0.12 THOUSAND/UL (ref 0–0.2)
IMM GRANULOCYTES NFR BLD AUTO: 1 % (ref 0–2)
LYMPHOCYTES # BLD AUTO: 2.13 THOUSANDS/ÂΜL (ref 0.6–4.47)
LYMPHOCYTES NFR BLD AUTO: 21 % (ref 14–44)
MCH RBC QN AUTO: 33.3 PG (ref 26.8–34.3)
MCHC RBC AUTO-ENTMCNC: 35.4 G/DL (ref 31.4–37.4)
MCV RBC AUTO: 94 FL (ref 82–98)
MONOCYTES # BLD AUTO: 0.78 THOUSAND/ÂΜL (ref 0.17–1.22)
MONOCYTES NFR BLD AUTO: 8 % (ref 4–12)
NEUTROPHILS # BLD AUTO: 6.82 THOUSANDS/ÂΜL (ref 1.85–7.62)
NEUTS SEG NFR BLD AUTO: 69 % (ref 43–75)
NRBC BLD AUTO-RTO: 0 /100 WBCS
PLATELET # BLD AUTO: 256 THOUSANDS/UL (ref 149–390)
PMV BLD AUTO: 9 FL (ref 8.9–12.7)
POTASSIUM SERPL-SCNC: 3.6 MMOL/L (ref 3.5–5.3)
POTASSIUM SERPL-SCNC: 3.9 MMOL/L (ref 3.5–5.3)
RBC # BLD AUTO: 4.17 MILLION/UL (ref 3.88–5.62)
SODIUM SERPL-SCNC: 125 MMOL/L (ref 135–147)
SODIUM SERPL-SCNC: 127 MMOL/L (ref 135–147)
WBC # BLD AUTO: 10.03 THOUSAND/UL (ref 4.31–10.16)

## 2022-12-13 RX ORDER — HEPARIN SODIUM 5000 [USP'U]/ML
5000 INJECTION, SOLUTION INTRAVENOUS; SUBCUTANEOUS EVERY 8 HOURS SCHEDULED
Status: DISCONTINUED | OUTPATIENT
Start: 2022-12-13 | End: 2022-12-14 | Stop reason: HOSPADM

## 2022-12-13 RX ORDER — SODIUM CHLORIDE 1000 MG
2 TABLET, SOLUBLE MISCELLANEOUS
Status: CANCELLED | OUTPATIENT
Start: 2022-12-14

## 2022-12-13 RX ORDER — AMLODIPINE BESYLATE 10 MG/1
10 TABLET ORAL DAILY
Status: DISCONTINUED | OUTPATIENT
Start: 2022-12-14 | End: 2022-12-14 | Stop reason: HOSPADM

## 2022-12-13 RX ORDER — AMLODIPINE BESYLATE 10 MG/1
10 TABLET ORAL DAILY
Status: DISCONTINUED | OUTPATIENT
Start: 2022-12-14 | End: 2022-12-13 | Stop reason: HOSPADM

## 2022-12-13 RX ORDER — ALBUTEROL SULFATE 2.5 MG/3ML
2.5 SOLUTION RESPIRATORY (INHALATION) EVERY 6 HOURS PRN
Status: DISCONTINUED | OUTPATIENT
Start: 2022-12-13 | End: 2022-12-14 | Stop reason: HOSPADM

## 2022-12-13 RX ORDER — NICOTINE 21 MG/24HR
1 PATCH, TRANSDERMAL 24 HOURS TRANSDERMAL DAILY
Status: DISCONTINUED | OUTPATIENT
Start: 2022-12-14 | End: 2022-12-14 | Stop reason: HOSPADM

## 2022-12-13 RX ORDER — COSYNTROPIN 0.25 MG/ML
0.25 INJECTION, POWDER, FOR SOLUTION INTRAMUSCULAR; INTRAVENOUS ONCE
Status: COMPLETED | OUTPATIENT
Start: 2022-12-13 | End: 2022-12-13

## 2022-12-13 RX ORDER — TORSEMIDE 10 MG/1
10 TABLET ORAL DAILY
Status: CANCELLED | OUTPATIENT
Start: 2022-12-14

## 2022-12-13 RX ORDER — AMLODIPINE BESYLATE 5 MG/1
5 TABLET ORAL DAILY
Status: DISCONTINUED | OUTPATIENT
Start: 2022-12-14 | End: 2022-12-13

## 2022-12-13 RX ORDER — LABETALOL HYDROCHLORIDE 5 MG/ML
10 INJECTION, SOLUTION INTRAVENOUS EVERY 6 HOURS PRN
Status: DISCONTINUED | OUTPATIENT
Start: 2022-12-13 | End: 2022-12-14 | Stop reason: HOSPADM

## 2022-12-13 RX ORDER — LABETALOL HYDROCHLORIDE 5 MG/ML
10 INJECTION, SOLUTION INTRAVENOUS EVERY 6 HOURS PRN
Status: CANCELLED | OUTPATIENT
Start: 2022-12-13

## 2022-12-13 RX ORDER — AMLODIPINE BESYLATE 5 MG/1
5 TABLET ORAL DAILY
Status: DISCONTINUED | OUTPATIENT
Start: 2022-12-13 | End: 2022-12-13

## 2022-12-13 RX ORDER — ALBUTEROL SULFATE 2.5 MG/3ML
2.5 SOLUTION RESPIRATORY (INHALATION) EVERY 6 HOURS PRN
Status: CANCELLED | OUTPATIENT
Start: 2022-12-13

## 2022-12-13 RX ORDER — AMLODIPINE BESYLATE 10 MG/1
10 TABLET ORAL DAILY
Status: CANCELLED | OUTPATIENT
Start: 2022-12-14

## 2022-12-13 RX ORDER — LOSARTAN POTASSIUM 50 MG/1
50 TABLET ORAL DAILY
Status: DISCONTINUED | OUTPATIENT
Start: 2022-12-14 | End: 2022-12-14 | Stop reason: HOSPADM

## 2022-12-13 RX ORDER — SODIUM CHLORIDE 9 MG/ML
75 INJECTION, SOLUTION INTRAVENOUS CONTINUOUS
Status: DISCONTINUED | OUTPATIENT
Start: 2022-12-13 | End: 2022-12-13

## 2022-12-13 RX ORDER — ACETAMINOPHEN 325 MG/1
650 TABLET ORAL EVERY 6 HOURS PRN
Status: DISCONTINUED | OUTPATIENT
Start: 2022-12-13 | End: 2022-12-14 | Stop reason: HOSPADM

## 2022-12-13 RX ORDER — TORSEMIDE 10 MG/1
10 TABLET ORAL DAILY
Status: DISCONTINUED | OUTPATIENT
Start: 2022-12-13 | End: 2022-12-13 | Stop reason: HOSPADM

## 2022-12-13 RX ORDER — LOSARTAN POTASSIUM 50 MG/1
50 TABLET ORAL DAILY
Status: CANCELLED | OUTPATIENT
Start: 2022-12-14

## 2022-12-13 RX ORDER — TORSEMIDE 10 MG/1
10 TABLET ORAL DAILY
Status: DISCONTINUED | OUTPATIENT
Start: 2022-12-14 | End: 2022-12-14 | Stop reason: HOSPADM

## 2022-12-13 RX ORDER — ACETAMINOPHEN 325 MG/1
650 TABLET ORAL EVERY 6 HOURS PRN
Status: CANCELLED | OUTPATIENT
Start: 2022-12-13

## 2022-12-13 RX ORDER — TAMSULOSIN HYDROCHLORIDE 0.4 MG/1
0.4 CAPSULE ORAL
Status: DISCONTINUED | OUTPATIENT
Start: 2022-12-14 | End: 2022-12-14 | Stop reason: HOSPADM

## 2022-12-13 RX ORDER — LOSARTAN POTASSIUM 50 MG/1
50 TABLET ORAL DAILY
Status: DISCONTINUED | OUTPATIENT
Start: 2022-12-14 | End: 2022-12-13 | Stop reason: HOSPADM

## 2022-12-13 RX ORDER — NICOTINE 21 MG/24HR
1 PATCH, TRANSDERMAL 24 HOURS TRANSDERMAL DAILY
Status: CANCELLED | OUTPATIENT
Start: 2022-12-14

## 2022-12-13 RX ORDER — SODIUM CHLORIDE 1000 MG
2 TABLET, SOLUBLE MISCELLANEOUS
Status: DISCONTINUED | OUTPATIENT
Start: 2022-12-13 | End: 2022-12-13 | Stop reason: HOSPADM

## 2022-12-13 RX ORDER — SODIUM CHLORIDE 1000 MG
2 TABLET, SOLUBLE MISCELLANEOUS
Status: DISCONTINUED | OUTPATIENT
Start: 2022-12-14 | End: 2022-12-14 | Stop reason: HOSPADM

## 2022-12-13 RX ORDER — TAMSULOSIN HYDROCHLORIDE 0.4 MG/1
0.4 CAPSULE ORAL
Status: CANCELLED | OUTPATIENT
Start: 2022-12-14

## 2022-12-13 RX ADMIN — COSYNTROPIN 0.25 MG: 0.25 INJECTION, POWDER, LYOPHILIZED, FOR SOLUTION INTRAVENOUS at 15:40

## 2022-12-13 RX ADMIN — LOSARTAN POTASSIUM 50 MG: 50 TABLET, FILM COATED ORAL at 08:14

## 2022-12-13 RX ADMIN — SODIUM CHLORIDE 75 ML/HR: 0.9 INJECTION, SOLUTION INTRAVENOUS at 11:29

## 2022-12-13 RX ADMIN — Medication 2 G: at 18:02

## 2022-12-13 RX ADMIN — NICOTINE 1 PATCH: 14 PATCH, EXTENDED RELEASE TRANSDERMAL at 08:15

## 2022-12-13 RX ADMIN — Medication 2 G: at 15:40

## 2022-12-13 RX ADMIN — TAMSULOSIN HYDROCHLORIDE 0.4 MG: 0.4 CAPSULE ORAL at 18:03

## 2022-12-13 RX ADMIN — AMLODIPINE BESYLATE 5 MG: 5 TABLET ORAL at 11:26

## 2022-12-13 RX ADMIN — TORSEMIDE 10 MG: 10 TABLET ORAL at 15:40

## 2022-12-13 NOTE — ASSESSMENT & PLAN NOTE
· POA, also seen on CT a/p without significant findings  · No signs of strangulation/incarceration, stable  · Recommend outpatient follow-up with general surgery for evaluation

## 2022-12-13 NOTE — ASSESSMENT & PLAN NOTE
Reports increased SOB and FITZPATRICK over the past 3 weeks  Poor exercise tolerance  Denies chest pain, cough, wheezing, orthopnea, PND, edema  · Unclear etiology, consider underlying obstructive lung disease given smoking history  · COVID/flu/RSV: negative   · CXR: No acute cardiopulmonary disease  · CTA chest: No PE, no acute pulmonary disease, no metastatic disease noted  · Venous duplex US lower extremities: No evidence of DVT, superficial thrombophlebitis  · Echo: EF 60%  Normal systolic/diastolic function     · Albuterol p r n , supportive care  · Recommend PFTs at discharge r/o underlying obstructive lung dz w/ tobacco use  · Continue to monitor respiratory status Stable.

## 2022-12-13 NOTE — ASSESSMENT & PLAN NOTE
Patient presents with night sweats, unexplained weight gain, early satiety, abdominal pain  Hx of testicular mass not improved with abx treatment, patient did not follow-up with Urology     · Likely 2/2 suspected metastatic disease  · CT A/P: Retroperitoneal lymphadenopathy concerning for malignant process, specifically metastatic testicular cancer given the findings on prior scrotal ultrasound  · CTA Chest: Nothing to indicate metastatic disease  · AFP/ LDH: WNL  · [place] Tumor Marker: Pending  · Urology Consulted:   · Anticipate IR consult for lymph node biopsy pending U/S and tumor marker results  · Transfer to [facility] for Urology and possible OR intervention 12/14

## 2022-12-13 NOTE — PLAN OF CARE
Problem: PAIN - ADULT  Goal: Verbalizes/displays adequate comfort level or baseline comfort level  Description: Interventions:  - Encourage patient to monitor pain and request assistance  - Assess pain using appropriate pain scale  - Administer analgesics based on type and severity of pain and evaluate response  - Implement non-pharmacological measures as appropriate and evaluate response  - Consider cultural and social influences on pain and pain management  - Notify physician/advanced practitioner if interventions unsuccessful or patient reports new pain  Outcome: Progressing     Problem: INFECTION - ADULT  Goal: Absence or prevention of progression during hospitalization  Description: INTERVENTIONS:  - Assess and monitor for signs and symptoms of infection  - Monitor lab/diagnostic results  - Monitor all insertion sites, i e  indwelling lines, tubes, and drains  - Monitor endotracheal if appropriate and nasal secretions for changes in amount and color  - [place] appropriate cooling/warming therapies per order  - Administer medications as ordered  - Instruct and encourage patient and family to use good hand hygiene technique  - Identify and instruct in appropriate isolation precautions for identified infection/condition  Outcome: Progressing     Problem: DISCHARGE PLANNING  Goal: Discharge to home or other facility with appropriate resources  Description: INTERVENTIONS:  - Identify barriers to discharge w/patient and caregiver  - Arrange for needed discharge resources and transportation as appropriate  - Identify discharge learning needs (meds, wound care, etc )  - Arrange for interpretive services to assist at discharge as needed  - Refer to Case Management Department for coordinating discharge planning if the patient needs post-hospital services based on physician/advanced practitioner order or complex needs related to functional status, cognitive ability, or social support system  Outcome: Progressing Problem: Knowledge Deficit  Goal: Patient/family/caregiver demonstrates understanding of disease process, treatment plan, medications, and discharge instructions  Description: Complete learning assessment and assess knowledge base    Interventions:  - Provide teaching at level of understanding  - Provide teaching via preferred learning methods  Outcome: Progressing     Problem: RESPIRATORY - ADULT  Goal: Achieves optimal ventilation and oxygenation  Description: INTERVENTIONS:  - Assess for changes in respiratory status  - Assess for changes in mentation and behavior  - Position to facilitate oxygenation and minimize respiratory effort  - Oxygen administered by appropriate delivery if ordered  - Initiate smoking cessation education as indicated  - Encourage broncho-pulmonary hygiene including cough, deep breathe, Incentive Spirometry  - Assess the need for suctioning and aspirate as needed  - Assess and instruct to report SOB or any respiratory difficulty  - Respiratory Therapy support as indicated  Outcome: Progressing

## 2022-12-13 NOTE — ASSESSMENT & PLAN NOTE
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

## 2022-12-13 NOTE — ASSESSMENT & PLAN NOTE
· With accelerated hypertension, BP consistently >150/100s   · No prior diagnosis of hypertension  · BP reviewed, most recent 160/90, somewhat improved diastolic  Asymptomatic    · C/w losartan (increased to 50mg daily)  · Add low-dose Amlodipine to start (5mg daily)  · Stress/Anxiety likely contributing  · Improved with PO ativan  · C/w IV Labetalol PRN   · Continue to monitor BP closely

## 2022-12-13 NOTE — ASSESSMENT & PLAN NOTE
· Reports 120lbs weight gain over past 2 months  · Denies change in diet/exercise     · Likely due to metastatic disease   · Lipid panel: Cholesterol 183/Triglycerides 280/ HDL 45/   · TSH: WNL   · Am cortisol/HgbA1: WNL

## 2022-12-13 NOTE — PLAN OF CARE
Problem: PAIN - ADULT  Goal: Verbalizes/displays adequate comfort level or baseline comfort level  Description: Interventions:  - Encourage patient to monitor pain and request assistance  - Assess pain using appropriate pain scale  - Administer analgesics based on type and severity of pain and evaluate response  - Implement non-pharmacological measures as appropriate and evaluate response  - Consider cultural and social influences on pain and pain management  - Notify physician/advanced practitioner if interventions unsuccessful or patient reports new pain  Outcome: Progressing     Problem: INFECTION - ADULT  Goal: Absence or prevention of progression during hospitalization  Description: INTERVENTIONS:  - Assess and monitor for signs and symptoms of infection  - Monitor lab/diagnostic results  - Monitor all insertion sites, i e  indwelling lines, tubes, and drains  - Monitor endotracheal if appropriate and nasal secretions for changes in amount and color  - [place] appropriate cooling/warming therapies per order  - Administer medications as ordered  - Instruct and encourage patient and family to use good hand hygiene technique  - Identify and instruct in appropriate isolation precautions for identified infection/condition  Outcome: Progressing     Problem: DISCHARGE PLANNING  Goal: Discharge to home or other facility with appropriate resources  Description: INTERVENTIONS:  - Identify barriers to discharge w/patient and caregiver  - Arrange for needed discharge resources and transportation as appropriate  - Identify discharge learning needs (meds, wound care, etc )  - Arrange for interpretive services to assist at discharge as needed  - Refer to Case Management Department for coordinating discharge planning if the patient needs post-hospital services based on physician/advanced practitioner order or complex needs related to functional status, cognitive ability, or social support system  Outcome: Progressing Problem: RESPIRATORY - ADULT  Goal: Achieves optimal ventilation and oxygenation  Description: INTERVENTIONS:  - Assess for changes in respiratory status  - Assess for changes in mentation and behavior  - Position to facilitate oxygenation and minimize respiratory effort  - Oxygen administered by appropriate delivery if ordered  - Initiate smoking cessation education as indicated  - Encourage broncho-pulmonary hygiene including cough, deep breathe, Incentive Spirometry  - Assess the need for suctioning and aspirate as needed  - Assess and instruct to report SOB or any respiratory difficulty  - Respiratory Therapy support as indicated  Outcome: Progressing

## 2022-12-13 NOTE — ASSESSMENT & PLAN NOTE
· Scrotal ultrasound: Left testicular nodules consistent with neoplasm with small hydrocele    · Possibly primary etiology for suspected metastatic disease  · See additional plan above, pending transfer to [facility]

## 2022-12-13 NOTE — DISCHARGE SUMMARY
[address]  Discharge- Benigno Quinn 1985, 30 y o  male MRN: [id number]  Unit/Bed#: -01 Encounter: [id number]  Primary Care Provider: Alma Lyle MD   Date and time admitted to hospital: 12/10/2022  5:18 PM    * Retroperitoneal lymphadenopathy  Assessment & Plan  Patient presents with night sweats, unexplained weight gain, early satiety, abdominal pain  Hx of testicular mass not improved with abx treatment, patient did not follow-up with Urology  · Likely 2/2 suspected metastatic disease  · CT A/P: Retroperitoneal lymphadenopathy concerning for malignant process, specifically metastatic testicular cancer given the findings on prior scrotal ultrasound  · CTA Chest: Nothing to indicate metastatic disease  · AFP/ LDH: WNL  · [place] Tumor Marker: Pending  · Urology Consulted:   · Anticipate IR consult for lymph node biopsy pending U/S and tumor marker results  · Transfer to [facility] for Urology and possible OR intervention 12/14    Testicular mass  Assessment & Plan  · Scrotal ultrasound: Left testicular nodules consistent with neoplasm with small hydrocele  · Possibly primary etiology for suspected metastatic disease  · See additional plan above, pending transfer to B    Hyponatremia  Assessment & Plan  Persistent hyponatremia during admission  Na today decreased to 127  · Am Cortisol/TSH: Normal  · Urine Os: 729/ Urine Na: 73  · Patient clinically euvolemic, fair oral intake per patient  · Start gentle IVFs w/ [email or web address], recheck BMP this afternoon to monitor response  · Nephrology consulted, recommendations appreciated  · C/w 1 8L fluid restriction, may need to increase  · Monitor BMP as indicated    Shortness of breath  Assessment & Plan  Reports increased SOB and FITZPATRICK over the past 3 weeks  Poor exercise tolerance  Denies chest pain, cough, wheezing, orthopnea, PND, edema    · Unclear etiology, consider underlying obstructive lung disease given smoking history  · COVID/flu/RSV: negative   · CXR: No acute cardiopulmonary disease  · CTA chest: No PE, no acute pulmonary disease, no metastatic disease noted  · Venous duplex US lower extremities: No evidence of DVT, superficial thrombophlebitis  · Echo: EF 60%  Normal systolic/diastolic function  · Albuterol p r n , supportive care  · Recommend PFTs at discharge r/o underlying obstructive lung dz w/ tobacco use  · Continue to monitor respiratory status    Hypertension  Assessment & Plan  · With accelerated hypertension, BP consistently >150/100s   · No prior diagnosis of hypertension  · BP reviewed, most recent 160/90, somewhat improved diastolic  Asymptomatic  · C/w losartan (increased to 50mg daily)  · Add low-dose Amlodipine to start (5mg daily)  · Stress/Anxiety likely contributing  · Improved with PO ativan  · C/w IV Labetalol PRN   · Continue to monitor BP closely    Urinary hesitancy  Assessment & Plan  · Complains of urinary hesitancy and intermittency  · UA negative   · Continue Flomax  · Monitor on urinary retention protocol     Unexplained weight gain  Assessment & Plan  · Reports 120lbs weight gain over past 2 months  · Denies change in diet/exercise     · Likely due to metastatic disease   · Lipid panel: Cholesterol 183/Triglycerides 280/ HDL 45/   · TSH: WNL   · Am cortisol/HgbA1: WNL    Tobacco use  Assessment & Plan  · 1ppd x 15 years  · Nicotine patch ordered  · Educated on smoking cessation    Umbilical hernia without obstruction and without gangrene  Assessment & Plan  · POA, also seen on CT a/p without significant findings  · No signs of strangulation/incarceration, stable  · Recommend outpatient follow-up with general surgery for evaluation      Medical Problems     Resolved Problems  Date Reviewed: 12/12/2022   None       Discharging Physician / Practitioner: Bert Price PA-C  PCP: Edyta Brown MD  Admission Date:   Admission Orders (From admission, onward)     Ordered 12/10/22 2044  INPATIENT ADMISSION  Once                      Discharge Date: 12/13/22    Consultations During Hospital Stay:  · Urology  · IR  · Nephrology     Procedures Performed:   · None    Significant Findings / Test Results:   US scrotum and testicles   Final Result by Parvez Olson MD (12/12 2025)       Left testicular nodules consistent with neoplasm with small hydrocele  Workstation performed: [id number]         CTA chest pe study   Final Result by Lluvia Carroll MD (12/12 1547)      No pulmonary embolus  No acute pulmonary disease  Nothing to indicate metastatic disease  Workstation performed: [id number]         VAS lower limb venous duplex study, complete bilateral   Final Result by Reginald Brittle, DO (12/12 1125)      CT abdomen pelvis with contrast   Final Result by Rae Sigala MD (12/10 2221)      Retroperitoneal lymphadenopathy which is concerning for a malignant process, specifically metastatic testicular cancer given the findings on prior scrotal ultrasound  Finding discussed with Dr Jayjay Beverly at the time of this dictation  Hepatic steatosis  Nonspecific fat stranding along the right common and external iliac vessels, of uncertain etiology  Workstation performed: [id number]         XR chest 1 view portable   Final Result by Lluvia Carroll MD (12/11 0820)      No acute cardiopulmonary disease                    Workstation performed: [id number]             Incidental Findings:   · None     Test Results Pending at Discharge (will require follow up):   · HCG tumor marker     Outpatient Tests Requested:  · Pending recommendations after readmission to [facility], likely will need to follow-up with Urology outpatient  · Follow-up with general surgery outpatient for evaluation of umbilical hernia  · Follow-up with pulmonology for baseline PFTs for evaluation of underlying obstructive lung disease    Complications: Need for transfer to [facility] for OR procedure with Urology, unavailable at this campus    Reason for Admission: Retroperitoneal lymphadenopathy    Hospital Course:   uRben Gonzalez is a 30 y o  male patient, PMH bipolar disorder, tobacco use w/ 1ppd smoking history, who originally presented to the hospital on 12/10/2022 due to shortness of breath, dyspnea on exertion, and weight gain  Reports he has been increasingly short of breath over the past 2 to 3 weeks, easily fatigued with walking short distances  Also with concerns of unintentional 120 pound weight gain over the past 2 months, also complains of early satiety, night sweats and left upper quadrant abdominal pain  In ED, noted to be hyponatremic, had CT abdomen pelvis revealing retroperitoneal adenopathy with concerns for metastatic testicular cancer  Scrotal ultrasound was completed that showed left testicular nodules consistent with neoplasm  Urology was consulted who recommended transfer to [facility] for Urology and Oncology services with possible need for surgical intervention  Please see above list of diagnoses and related plan for additional information  Condition at Discharge: stable    Discharge Day Visit / Exam:   Subjective:  Patient seen at bedside, denies any acute complaints    Vitals: Blood Pressure: 160/90 (12/13/22 0710)  Pulse: 95 (12/13/22 0710)  Temperature: 98 3 °F (36 8 °C) (12/13/22 0710)  Temp Source: Oral (12/13/22 0710)  Respirations: 16 (12/13/22 0710)  Height: 6' 4" (193 cm) (12/12/22 1233)  Weight - Scale: 134 kg (295 lb) (12/13/22 0600)  SpO2: 96 % (12/13/22 0447)     Exam:   Physical Exam  Constitutional:       General: He is not in acute distress  Appearance: He is not ill-appearing, toxic-appearing or diaphoretic  Cardiovascular:      Rate and Rhythm: Normal rate and regular rhythm  Pulses: Normal pulses  Heart sounds: Normal heart sounds  Pulmonary:      Effort: Pulmonary effort is normal  No respiratory distress  Breath sounds: Normal breath sounds  Abdominal:      General: Bowel sounds are normal  There is distension  Palpations: Abdomen is soft  Tenderness: There is no abdominal tenderness  Hernia: A hernia is present  Comments: Mild-moderate abdominal distention  Umbilical hernia present without signs of strangulation/incarceration  Musculoskeletal:         General: No swelling or tenderness  Skin:     General: Skin is warm  Neurological:      General: No focal deficit present  Mental Status: He is alert  Psychiatric:         Mood and Affect: Mood normal          Behavior: Behavior normal          Discussion with Family: Patient declined call to   Discharge instructions/Information to patient and family:   See after visit summary for information provided to patient and family  Provisions for Follow-Up Care:  See after visit summary for information related to follow-up care and any pertinent home health orders  Disposition:   [address] Transfer to [facility]    Planned Readmission: Yes     Discharge Statement:  I spent 30 minutes discharging the patient  This time was spent on the day of discharge  I had direct contact with the patient on the day of discharge  Greater than 50% of the total time was spent examining patient, answering all patient questions, arranging and discussing plan of care with patient as well as directly providing post-discharge instructions  Additional time then spent on discharge activities  Discharge Medications:  See after visit summary for reconciled discharge medications provided to patient and/or family        **Please Note: This note may have been constructed using a voice recognition system**

## 2022-12-13 NOTE — PROGRESS NOTES
[address]   Progress Note - Edwardo Pendleton 1985, 30 y o  male MRN: [id number]  Unit/Bed#: -01 Encounter: [id number]  Primary Care Provider: Mark Randle MD   Date and time admitted to hospital: 12/10/2022  5:18 PM    * Retroperitoneal lymphadenopathy  Assessment & Plan  Patient presents with night sweats, unexplained weight gain, early satiety, abdominal pain  Hx of testicular mass not improved with abx treatment, patient did not follow-up with Urology  · Likely 2/2 suspected metastatic disease  · CT A/P: Retroperitoneal lymphadenopathy concerning for malignant process, specifically metastatic testicular cancer given the findings on prior scrotal ultrasound  · CTA Chest: Nothing to indicate metastatic disease  · AFP/ LDH: WNL  · [place] Tumor Marker: Pending  · Urology Consulted:   · Anticipate IR consult for lymph node biopsy pending U/S and tumor marker results  · Transfer to [facility] for Urology and possible OR intervention 12/14    Testicular mass  Assessment & Plan  · Scrotal ultrasound: Left testicular nodules consistent with neoplasm with small hydrocele  · Possibly primary etiology for suspected metastatic disease  · See additional plan above, pending transfer to B    Hyponatremia  Assessment & Plan  Persistent hyponatremia during admission  Na today decreased to 127  · Am Cortisol/TSH: Normal  · Urine Os: 729/ Urine Na: 73  · Patient clinically euvolemic, fair oral intake per patient  · Nephrology consulted:  · Work-up suggestive of SIADH, likely due to suspected malignancy  · Initiate 1000 mL fluid restriction, start sodium chloride tabs 2 g TID, add torsemide 10 mg daily  · Check cortrosyn stimulation test  · Goal sodium 133 by tomorrow morning  · Monitor BMP as indicated    Shortness of breath  Assessment & Plan  Reports increased SOB and FITZPATRICK over the past 3 weeks  Poor exercise tolerance  Denies chest pain, cough, wheezing, orthopnea, PND, edema    · Unclear etiology, consider underlying obstructive lung disease given smoking history  · COVID/flu/RSV: negative   · CXR: No acute cardiopulmonary disease  · CTA chest: No PE, no acute pulmonary disease, no metastatic disease noted  · Venous duplex US lower extremities: No evidence of DVT, superficial thrombophlebitis  · Echo: EF 60%  Normal systolic/diastolic function  · Albuterol p r n , supportive care  · Recommend PFTs at discharge r/o underlying obstructive lung dz w/ tobacco use  · Continue to monitor respiratory status    Hypertension  Assessment & Plan  · With accelerated hypertension, BP consistently >150/100s   · No prior diagnosis of hypertension  · BP reviewed, most recent 160/90, somewhat improved diastolic  Asymptomatic  · C/w losartan (increased to 50mg daily)  · Add low-dose Amlodipine to start (5mg daily)  · Stress/Anxiety likely contributing  · Improved with PO ativan  · C/w IV Labetalol PRN   · Continue to monitor BP closely    Urinary hesitancy  Assessment & Plan  · Complains of urinary hesitancy and intermittency  · UA negative   · Continue Flomax  · Monitor on urinary retention protocol     Unexplained weight gain  Assessment & Plan  · Reports 120lbs weight gain over past 2 months  · Denies change in diet/exercise  · Likely due to metastatic disease   · Lipid panel: Cholesterol 183/Triglycerides 280/ HDL 45/   · TSH: WNL   · Am cortisol/HgbA1: WNL    Tobacco use  Assessment & Plan  · 1ppd x 15 years  · Nicotine patch ordered  · Educated on smoking cessation    Umbilical hernia without obstruction and without gangrene  Assessment & Plan  · POA, also seen on CT a/p without significant findings  · No signs of strangulation/incarceration, stable  · Recommend outpatient follow-up with general surgery for evaluation      VTE Pharmacologic Prophylaxis: VTE Score: 1 Low Risk (Score 0-2) - Encourage Ambulation      Patient Centered Rounds: I performed bedside rounds with nursing staff today   Discussions with Specialists or Other Care Team Provider: Urology, PACs, case management    Education and Discussions with Family / Patient: Updated  (mother) via phone  Time Spent for Care: 30 minutes  More than 50% of total time spent on counseling and coordination of care as described above  Current Length of Stay: 3 day(s)  Current Patient Status: Inpatient   Certification Statement: The patient will continue to require additional inpatient hospital stay due to pending transfer to [facility]  Discharge Plan: Anticipate discharge later today or tomorrow to [facility]    Code Status: Level 1 - Full Code    Subjective:   Patient is seen at bedside this a m , denies any acute complaints at this time  Agreeable with plan to transfer today  Objective:     Vitals:   Temp (24hrs), Av 6 °F (37 °C), Min:98 3 °F (36 8 °C), Max:98 9 °F (37 2 °C)    Temp:  [98 3 °F (36 8 °C)-98 9 °F (37 2 °C)] 98 4 °F (36 9 °C)  HR:  [] 96  Resp:  [16-18] 18  BP: (133-176)/() 165/103  SpO2:  [95 %-97 %] 97 %  Body mass index is 35 91 kg/m²  Input and Output Summary (last 24 hours): Intake/Output Summary (Last 24 hours) at 2022 1606  Last data filed at 2022 1800  Gross per 24 hour   Intake --   Output 150 ml   Net -150 ml       Physical Exam:   Physical Exam  Constitutional:       General: He is not in acute distress  Appearance: He is not ill-appearing, toxic-appearing or diaphoretic  Cardiovascular:      Rate and Rhythm: Normal rate and regular rhythm  Pulses: Normal pulses  Heart sounds: Normal heart sounds  Pulmonary:      Effort: Pulmonary effort is normal  No respiratory distress  Breath sounds: Normal breath sounds  Abdominal:      General: Bowel sounds are normal  There is distension  Palpations: Abdomen is soft  Tenderness: There is no abdominal tenderness  Hernia: A hernia is present  Comments: Mild-moderate abdominal distention  Umbilical hernia present without signs of strangulation/incarceration  Musculoskeletal:         General: No swelling or tenderness  Skin:     General: Skin is warm  Neurological:      General: No focal deficit present  Mental Status: He is alert  Psychiatric:         Mood and Affect: Mood normal          Behavior: Behavior normal          Additional Data:     Labs:  Results from last 7 days   Lab Units 12/13/22  0441   WBC Thousand/uL 10 03   HEMOGLOBIN g/dL 13 9   HEMATOCRIT % 39 3   PLATELETS Thousands/uL 256   NEUTROS PCT % 69   LYMPHS PCT % 21   MONOS PCT % 8   EOS PCT % 1     Results from last 7 days   Lab Units 12/13/22  1226 12/11/22  0444 12/10/22  1753   SODIUM mmol/L 125*   < > 129*   POTASSIUM mmol/L 3 6   < > 3 6   CHLORIDE mmol/L 92*   < > 92*   CO2 mmol/L 24   < > 29   BUN mg/dL 12   < > 13   CREATININE mg/dL 0 77   < > 0 88   ANION GAP mmol/L 9   < > 8   CALCIUM mg/dL 9 2   < > 9 4   ALBUMIN g/dL  --   --  4 3   TOTAL BILIRUBIN mg/dL  --   --  0 34   ALK PHOS U/L  --   --  92   ALT U/L  --   --  51   AST U/L  --   --  23   GLUCOSE RANDOM mg/dL 114   < > 86    < > = values in this interval not displayed       Results from last 7 days   Lab Units 12/10/22  1753   INR  0 88         Results from last 7 days   Lab Units 12/10/22  1753   HEMOGLOBIN A1C % 5 2     Results from last 7 days   Lab Units 12/10/22  1753   LACTIC ACID mmol/L 0 9       Lines/Drains:  Invasive Devices     Peripheral Intravenous Line  Duration           Peripheral IV 12/10/22 Right Antecubital 2 days                  Imaging: Reviewed radiology reports from this admission including: abdominal/pelvic CT and ultrasound(s)    Recent Cultures (last 7 days):         Last 24 Hours Medication List:   Current Facility-Administered Medications   Medication Dose Route Frequency Provider Last Rate   • acetaminophen  650 mg Oral Q6H PRN Ervin Orozco PA-C     • albuterol  2 5 mg Nebulization Q6H PRN Cindy Jackson JIMMY Lanza     • [START ON 12/14/2022] amLODIPine  5 mg Oral Daily Bharti Choi MD     • labetalol  10 mg Intravenous Q6H PRN Marsha Hanson MD     • [START ON 12/14/2022] losartan  50 mg Oral Daily Bharti Choi MD     • morphine injection  2 mg Intravenous Q4H PRN Marsha Hanson MD     • nicotine  1 patch Transdermal Daily Suzan Hi PA-C     • sodium chloride  2 g Oral TID With Meals Bharti Choi MD     • tamsulosin  0 4 mg Oral Daily With [facility], JIMMY     • torsemide  10 mg Oral Daily Bharti Choi MD          Today, Patient Was Seen By: Diane Hawthorne PA-C    **Please Note: This note may have been constructed using a voice recognition system  **

## 2022-12-13 NOTE — CONSULTS
Consultation - Nephrology   Jeffy Gutierrez 30 y o  male MRN: [id number]  Unit/Bed#: -01 Encounter: [id number]    Inpatient consult to Nephrology  Consult performed by: Haresh Moya MD  Consult ordered by: Teodoro Ragsdale PA-C          History of Present Illness   Physician Requesting Consult: Bakari Dao DO  Reason for Consult / Principal Problem: Hyponatremia    HPI: Jeffy Gutierrez is a 30y o  year old male history of a testicular mass who presents with shortness of breath for 2 weeks especially with exertion associated with 120 pound weight gain over 2 months! He has also had left upper quadrant abdominal discomfort  CT scan demonstrated retroperitoneal adenopathy with concerns for metastatic testicular cancer  We are asked to see the patient regarding hyponatremia    He denies any prior history of hyponatremia  He does drink at least 100 ounces of water a day  He also has been taking 8 NSAIDs a day for 2 weeks associated with the abdominal pain  No leg swelling  Serum sodium was borderline low at 134 July 2022 prior was normal  Serum sodium was 129 on 12/10  Serum sodium has slowly decreased at 125 on 12/13        History obtained from the patient, the chart and the old records which I completely reviewed        Review of systems:  General: No fevers or chills  Cardiovascular: See HPI  Respiratory: No cough  Gastrointestinal: 1 episode of nausea and vomiting but otherwise normal, no diarrhea, see HPI  Genitourinary: No dysuria hematuria voiding symptoms foamy urine  Neuro: No headaches  All other systems were reviewed and are negative    Historical Information   Past Medical History:   Diagnosis Date   • Depression    • Psychiatric disorder     bipolar     History reviewed  No pertinent surgical history    Social History   Social History     Substance and Sexual Activity   Alcohol Use Not Currently    Comment: 2 cases of beer daily      Social History     Substance and Sexual Activity   Drug Use Yes   • Types: Marijuana     Social History     Tobacco Use   Smoking Status Every Day   • Packs/day: 1 00   • Types: Cigarettes   Smokeless Tobacco Never     History reviewed  No pertinent family history      Meds/Allergies   all current active meds have been reviewed, current meds:   Current Facility-Administered Medications   Medication Dose Route Frequency   • acetaminophen (TYLENOL) tablet 650 mg  650 mg Oral Q6H PRN   • albuterol inhalation solution 2 5 mg  2 5 mg Nebulization Q6H PRN   • amLODIPine (NORVASC) tablet 5 mg  5 mg Oral Daily   • labetalol (NORMODYNE) injection 10 mg  10 mg Intravenous Q6H PRN   • losartan (COZAAR) tablet 50 mg  50 mg Oral Daily   • morphine injection 2 mg  2 mg Intravenous Q4H PRN   • nicotine (NICODERM CQ) 14 mg/24hr TD 24 hr patch 1 patch  1 patch Transdermal Daily   • sodium chloride 0 9 % infusion  75 mL/hr Intravenous Continuous   • tamsulosin (FLOMAX) capsule 0 4 mg  0 4 mg Oral Daily With Dinner    and PTA meds:    Medications Prior to Admission   Medication   • cyclobenzaprine (FLEXERIL) 10 mg tablet   • gabapentin (NEURONTIN) 300 mg capsule   • gabapentin (Neurontin) 300 mg capsule   • naproxen (NAPROSYN) 500 mg tablet   • predniSONE 20 mg tablet       No Known Allergies    Objective     Intake/Output Summary (Last 24 hours) at 12/13/2022 1317  Last data filed at 12/12/2022 1800  Gross per 24 hour   Intake --   Output 150 ml   Net -150 ml     Patient Vitals for the past 24 hrs:   BP Temp Temp src Pulse Resp SpO2 Weight   12/13/22 0710 160/90 98 3 °F (36 8 °C) Oral 95 16 -- --   12/13/22 0600 -- -- -- -- -- -- 134 kg (295 lb)   12/13/22 0447 (!) 162/105 98 6 °F (37 °C) Oral 96 18 96 % --   12/12/22 2346 133/74 98 7 °F (37 1 °C) Oral 100 18 96 % --   12/12/22 2045 (!) 168/112 98 8 °F (37 1 °C) Oral 98 16 95 % --   12/12/22 1802 (!) 176/104 98 9 °F (37 2 °C) Oral 94 16 95 % --   12/12/22 1537 (!) 171/114 98 5 °F (36 9 °C) Oral (!) 109 16 96 % --       Invasive Devices: Vitals:    12/13/22 0710   BP: 160/90   Pulse: 95   Resp: 16   Temp: 98 3 °F (36 8 °C)   SpO2:      General: Well-developed well-nourished no acute distress  Skin: No acute rash  Eyes: No scleral icterus and noninjected  ENT: Normocephalic/atraumatic, mucous membranes moist  Neck: Supple, no jugular venous distention, no carotid bruits, 2+ carotid upstroke no thyromegaly and trachea is midline  Back: No CVA tenderness  Chest: Clear to auscultation and percussion, good respiratory effort no use of accessory respiratory muscles  CVS: Regular rate and rhythm without a murmur rub or gallops appreciable  Abdomen: Normal bowel sounds, distended diffusely, some mild to moderate tenderness over the left upper quadrant, no overt hepatosplenomegaly  Extremities: No clubbing, no cyanosis, no edema, 2+ dorsalis pedis pulses no femoral bruits no arthritic changes  Neuro: No gross focality  Psych: Alert, oriented and appropriate    Current Weight: Weight - Scale: 134 kg (295 lb)  First Weight: Weight - Scale: (!) 139 kg (306 lb)    Lab Results:  I have personally reviewed pertinent labs    CBC:   Lab Results   Component Value Date    WBC 10 03 12/13/2022    RBC 4 17 12/13/2022     CMP:   Lab Results   Component Value Date    CL 92 (L) 12/13/2022    CO2 24 12/13/2022    BUN 12 12/13/2022    CREATININE 0 77 12/13/2022    CALCIUM 9 2 12/13/2022    AST 23 12/10/2022    ALT 51 12/10/2022    ALKPHOS 92 12/10/2022    EGFR 117 12/13/2022     Phosphorus:   Lab Results   Component Value Date    PHOS 3 3 12/10/2022     Magnesium:   Lab Results   Component Value Date    MG 2 0 12/10/2022     Urinalysis:   Lab Results   Component Value Date    COLORU Yellow 12/10/2022    CLARITYU Clear 12/10/2022    SPECGRAV 1 020 12/10/2022    PHUR 6 0 12/10/2022    PHUR 6 5 11/07/2017    LEUKOCYTESUR Negative 12/10/2022    NITRITE Negative 12/10/2022    GLUCOSEU Negative 12/10/2022    KETONESU Negative 12/10/2022    BILIRUBINUR Negative 12/10/2022 BLOODU Negative 12/10/2022     BMP:   Lab Results   Component Value Date    SODIUM 125 (L) 12/13/2022    CO2 24 12/13/2022    BUN 12 12/13/2022    CREATININE 0 77 12/13/2022    CALCIUM 9 2 12/13/2022     ABGs: No results found for: [facility]  Radiology review:     CT of the abdomen and pelvis from 12/10: Retroperitoneal lymphadenopathy concerning for malignancy, specifically metastatic testicular cancer given findings on prior scrotal ultrasound  Hepatic steatosis  No hydronephrosis  CT angiography of the chest: No pulmonary embolus, no acute pulmonary process        Assessment/Plan   30y o  year old male history of a testicular mass who presents with shortness of breath for 2 weeks especially with exertion associated with 120 pound weight gain over 2 months! 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, also fairly excessive water intake and also nonsteroidal contribution as it increases ADH effect  Work-up:  · Urine sodium 73 compatible with SIADH  · Urine osmolality 729  · Serum osmolality 275 compatible with true hypotonic hyponatremia  · A m  cortisol 5 6 rule out adrenal insufficiency with a Cortrosyn stimulation test  · TSH normal  · Uric acid 3 4 compatible with SIADH  · CT scans as outlined above probable metastatic retroperitoneal lymphadenopathy    Recommend:  · Complete the work-up with a Cortrosyn stimulation test  · Fluid restriction 1000 mL/day  · Sodium chloride tablets 2 g 3 times daily  · Add torsemide 10 mg daily for hypertension along with increased water excretion especially with high urine osmolality    Goal sodium is eventually close to 135 but initially over 130  Can allow an increase of approximately 133 by tomorrow morning  #2    Hypertension: I agree with amlodipine/losartan for now  Add torsemide as well  We will Hep-Lock isotonic saline given most likely patient has SIADH which may be worsened with the isotonic saline    #3  Probable metastatic testicular cancer to be transferred to [facility] for probable left orchiectomy by Dr Nalini Lundberg  Discussed at length with primary service regarding treatment for the hyponatremia    Portions of the record may have been created with voice recognition software  Occasional wrong word or "sound a like" substitutions may have occurred due to the inherent limitations of voice recognition software  Read the chart carefully and recognize, using context, where substitutions have occurred

## 2022-12-14 ENCOUNTER — ANESTHESIA (INPATIENT)
Dept: PERIOP | Facility: HOSPITAL | Age: 37
End: 2022-12-14

## 2022-12-14 ENCOUNTER — TELEPHONE (OUTPATIENT)
Dept: UROLOGY | Facility: CLINIC | Age: 37
End: 2022-12-14

## 2022-12-14 VITALS
SYSTOLIC BLOOD PRESSURE: 130 MMHG | HEART RATE: 94 BPM | TEMPERATURE: 97.3 F | OXYGEN SATURATION: 93 % | BODY MASS INDEX: 36.39 KG/M2 | WEIGHT: 298.94 LBS | DIASTOLIC BLOOD PRESSURE: 89 MMHG | RESPIRATION RATE: 18 BRPM

## 2022-12-14 DIAGNOSIS — N50.89 TESTICULAR MASS: ICD-10-CM

## 2022-12-14 DIAGNOSIS — R59.0 RETROPERITONEAL LYMPHADENOPATHY: Primary | ICD-10-CM

## 2022-12-14 LAB
ACTH PLAS-MCNC: 36.2 PG/ML (ref 7.2–63.3)
ANION GAP SERPL CALCULATED.3IONS-SCNC: 10 MMOL/L (ref 4–13)
ANION GAP SERPL CALCULATED.3IONS-SCNC: 8 MMOL/L (ref 4–13)
ANION GAP SERPL CALCULATED.3IONS-SCNC: 9 MMOL/L (ref 4–13)
BASOPHILS # BLD AUTO: 0.06 THOUSANDS/ÂΜL (ref 0–0.1)
BASOPHILS NFR BLD AUTO: 1 % (ref 0–1)
BUN SERPL-MCNC: 14 MG/DL (ref 5–25)
BUN SERPL-MCNC: 15 MG/DL (ref 5–25)
BUN SERPL-MCNC: 16 MG/DL (ref 5–25)
CALCIUM SERPL-MCNC: 9 MG/DL (ref 8.3–10.1)
CALCIUM SERPL-MCNC: 9.2 MG/DL (ref 8.3–10.1)
CALCIUM SERPL-MCNC: 9.3 MG/DL (ref 8.3–10.1)
CHLORIDE SERPL-SCNC: 94 MMOL/L (ref 96–108)
CHLORIDE SERPL-SCNC: 95 MMOL/L (ref 96–108)
CHLORIDE SERPL-SCNC: 95 MMOL/L (ref 96–108)
CO2 SERPL-SCNC: 24 MMOL/L (ref 21–32)
CO2 SERPL-SCNC: 24 MMOL/L (ref 21–32)
CO2 SERPL-SCNC: 28 MMOL/L (ref 21–32)
CORTIS SERPL-MCNC: 17.1 UG/DL
CORTIS SERPL-MCNC: 22.4 UG/DL
CORTIS SERPL-MCNC: 4.1 UG/DL
CREAT SERPL-MCNC: 0.85 MG/DL (ref 0.6–1.3)
CREAT SERPL-MCNC: 0.88 MG/DL (ref 0.6–1.3)
CREAT SERPL-MCNC: 1.07 MG/DL (ref 0.6–1.3)
EOSINOPHIL # BLD AUTO: 0.1 THOUSAND/ÂΜL (ref 0–0.61)
EOSINOPHIL NFR BLD AUTO: 1 % (ref 0–6)
ERYTHROCYTE [DISTWIDTH] IN BLOOD BY AUTOMATED COUNT: 12.6 % (ref 11.6–15.1)
GFR SERPL CREATININE-BSD FRML MDRD: 111 ML/MIN/1.73SQ M
GFR SERPL CREATININE-BSD FRML MDRD: 112 ML/MIN/1.73SQ M
GFR SERPL CREATININE-BSD FRML MDRD: 89 ML/MIN/1.73SQ M
GLUCOSE SERPL-MCNC: 105 MG/DL (ref 65–140)
GLUCOSE SERPL-MCNC: 111 MG/DL (ref 65–140)
GLUCOSE SERPL-MCNC: 154 MG/DL (ref 65–140)
HCT VFR BLD AUTO: 41.2 % (ref 36.5–49.3)
HGB BLD-MCNC: 14.6 G/DL (ref 12–17)
IMM GRANULOCYTES # BLD AUTO: 0.11 THOUSAND/UL (ref 0–0.2)
IMM GRANULOCYTES NFR BLD AUTO: 1 % (ref 0–2)
LYMPHOCYTES # BLD AUTO: 1.97 THOUSANDS/ÂΜL (ref 0.6–4.47)
LYMPHOCYTES NFR BLD AUTO: 17 % (ref 14–44)
MAGNESIUM SERPL-MCNC: 2.6 MG/DL (ref 1.6–2.6)
MCH RBC QN AUTO: 33.3 PG (ref 26.8–34.3)
MCHC RBC AUTO-ENTMCNC: 35.4 G/DL (ref 31.4–37.4)
MCV RBC AUTO: 94 FL (ref 82–98)
MONOCYTES # BLD AUTO: 0.84 THOUSAND/ÂΜL (ref 0.17–1.22)
MONOCYTES NFR BLD AUTO: 7 % (ref 4–12)
NEUTROPHILS # BLD AUTO: 8.23 THOUSANDS/ÂΜL (ref 1.85–7.62)
NEUTS SEG NFR BLD AUTO: 73 % (ref 43–75)
NRBC BLD AUTO-RTO: 0 /100 WBCS
PLATELET # BLD AUTO: 292 THOUSANDS/UL (ref 149–390)
PMV BLD AUTO: 8.8 FL (ref 8.9–12.7)
POTASSIUM SERPL-SCNC: 3.5 MMOL/L (ref 3.5–5.3)
POTASSIUM SERPL-SCNC: 3.5 MMOL/L (ref 3.5–5.3)
POTASSIUM SERPL-SCNC: 3.9 MMOL/L (ref 3.5–5.3)
RBC # BLD AUTO: 4.38 MILLION/UL (ref 3.88–5.62)
SODIUM SERPL-SCNC: 128 MMOL/L (ref 135–147)
SODIUM SERPL-SCNC: 129 MMOL/L (ref 135–147)
SODIUM SERPL-SCNC: 130 MMOL/L (ref 135–147)
WBC # BLD AUTO: 11.31 THOUSAND/UL (ref 4.31–10.16)

## 2022-12-14 PROCEDURE — 0VTB0ZZ RESECTION OF LEFT TESTIS, OPEN APPROACH: ICD-10-PCS | Performed by: UROLOGY

## 2022-12-14 RX ORDER — HYDROMORPHONE HCL/PF 1 MG/ML
0.5 SYRINGE (ML) INJECTION
Status: DISCONTINUED | OUTPATIENT
Start: 2022-12-14 | End: 2022-12-14 | Stop reason: HOSPADM

## 2022-12-14 RX ORDER — LOSARTAN POTASSIUM 50 MG/1
50 TABLET ORAL DAILY
Qty: 30 TABLET | Refills: 0 | Status: SHIPPED | OUTPATIENT
Start: 2022-12-15

## 2022-12-14 RX ORDER — MAGNESIUM HYDROXIDE 1200 MG/15ML
LIQUID ORAL AS NEEDED
Status: DISCONTINUED | OUTPATIENT
Start: 2022-12-14 | End: 2022-12-14 | Stop reason: HOSPADM

## 2022-12-14 RX ORDER — SODIUM CHLORIDE, SODIUM LACTATE, POTASSIUM CHLORIDE, CALCIUM CHLORIDE 600; 310; 30; 20 MG/100ML; MG/100ML; MG/100ML; MG/100ML
INJECTION, SOLUTION INTRAVENOUS CONTINUOUS PRN
Status: DISCONTINUED | OUTPATIENT
Start: 2022-12-14 | End: 2022-12-14

## 2022-12-14 RX ORDER — FENTANYL CITRATE/PF 50 MCG/ML
25 SYRINGE (ML) INJECTION
Status: DISCONTINUED | OUTPATIENT
Start: 2022-12-14 | End: 2022-12-14 | Stop reason: HOSPADM

## 2022-12-14 RX ORDER — ONDANSETRON 2 MG/ML
4 INJECTION INTRAMUSCULAR; INTRAVENOUS ONCE AS NEEDED
Status: DISCONTINUED | OUTPATIENT
Start: 2022-12-14 | End: 2022-12-14 | Stop reason: HOSPADM

## 2022-12-14 RX ORDER — LIDOCAINE HYDROCHLORIDE 10 MG/ML
INJECTION, SOLUTION EPIDURAL; INFILTRATION; INTRACAUDAL; PERINEURAL AS NEEDED
Status: DISCONTINUED | OUTPATIENT
Start: 2022-12-14 | End: 2022-12-14

## 2022-12-14 RX ORDER — LOSARTAN POTASSIUM 50 MG/1
50 TABLET ORAL DAILY
Qty: 30 TABLET | Refills: 0 | Status: CANCELLED | OUTPATIENT
Start: 2022-12-15

## 2022-12-14 RX ORDER — TORSEMIDE 10 MG/1
10 TABLET ORAL DAILY
Qty: 30 TABLET | Refills: 0 | Status: CANCELLED | OUTPATIENT
Start: 2022-12-15

## 2022-12-14 RX ORDER — FENTANYL CITRATE 50 UG/ML
INJECTION, SOLUTION INTRAMUSCULAR; INTRAVENOUS AS NEEDED
Status: DISCONTINUED | OUTPATIENT
Start: 2022-12-14 | End: 2022-12-14

## 2022-12-14 RX ORDER — HYDROMORPHONE HCL/PF 1 MG/ML
SYRINGE (ML) INJECTION AS NEEDED
Status: DISCONTINUED | OUTPATIENT
Start: 2022-12-14 | End: 2022-12-14

## 2022-12-14 RX ORDER — OXYCODONE HYDROCHLORIDE 5 MG/1
5 TABLET ORAL EVERY 4 HOURS PRN
Qty: 10 TABLET | Refills: 0 | Status: SHIPPED | OUTPATIENT
Start: 2022-12-14 | End: 2022-12-19

## 2022-12-14 RX ORDER — ROCURONIUM BROMIDE 10 MG/ML
INJECTION, SOLUTION INTRAVENOUS AS NEEDED
Status: DISCONTINUED | OUTPATIENT
Start: 2022-12-14 | End: 2022-12-14

## 2022-12-14 RX ORDER — DOCUSATE SODIUM 100 MG/1
100 CAPSULE, LIQUID FILLED ORAL 2 TIMES DAILY
Qty: 30 CAPSULE | Refills: 0 | Status: SHIPPED | OUTPATIENT
Start: 2022-12-14 | End: 2022-12-29

## 2022-12-14 RX ORDER — GLYCOPYRROLATE 0.2 MG/ML
INJECTION INTRAMUSCULAR; INTRAVENOUS AS NEEDED
Status: DISCONTINUED | OUTPATIENT
Start: 2022-12-14 | End: 2022-12-14

## 2022-12-14 RX ORDER — AMLODIPINE BESYLATE 10 MG/1
10 TABLET ORAL DAILY
Qty: 30 TABLET | Refills: 0 | Status: SHIPPED | OUTPATIENT
Start: 2022-12-14

## 2022-12-14 RX ORDER — BUPIVACAINE HYDROCHLORIDE 5 MG/ML
INJECTION, SOLUTION PERINEURAL AS NEEDED
Status: DISCONTINUED | OUTPATIENT
Start: 2022-12-14 | End: 2022-12-14 | Stop reason: HOSPADM

## 2022-12-14 RX ORDER — NEOSTIGMINE METHYLSULFATE 1 MG/ML
INJECTION INTRAVENOUS AS NEEDED
Status: DISCONTINUED | OUTPATIENT
Start: 2022-12-14 | End: 2022-12-14

## 2022-12-14 RX ORDER — SODIUM CHLORIDE 1000 MG
2 TABLET, SOLUBLE MISCELLANEOUS
Qty: 30 TABLET | Refills: 0 | Status: SHIPPED | OUTPATIENT
Start: 2022-12-14 | End: 2022-12-21 | Stop reason: SDUPTHER

## 2022-12-14 RX ORDER — MIDAZOLAM HYDROCHLORIDE 2 MG/2ML
INJECTION, SOLUTION INTRAMUSCULAR; INTRAVENOUS AS NEEDED
Status: DISCONTINUED | OUTPATIENT
Start: 2022-12-14 | End: 2022-12-14

## 2022-12-14 RX ORDER — TAMSULOSIN HYDROCHLORIDE 0.4 MG/1
0.4 CAPSULE ORAL
Qty: 30 CAPSULE | Refills: 0 | Status: SHIPPED | OUTPATIENT
Start: 2022-12-14

## 2022-12-14 RX ORDER — TAMSULOSIN HYDROCHLORIDE 0.4 MG/1
0.4 CAPSULE ORAL
Qty: 30 CAPSULE | Refills: 0 | Status: CANCELLED | OUTPATIENT
Start: 2022-12-14

## 2022-12-14 RX ORDER — SODIUM CHLORIDE 1000 MG
2 TABLET, SOLUBLE MISCELLANEOUS
Qty: 28 TABLET | Refills: 0 | Status: CANCELLED | OUTPATIENT
Start: 2022-12-14

## 2022-12-14 RX ORDER — ALBUTEROL SULFATE 90 UG/1
2 AEROSOL, METERED RESPIRATORY (INHALATION) EVERY 6 HOURS PRN
Qty: 8.5 G | Refills: 0 | Status: SHIPPED | OUTPATIENT
Start: 2022-12-14

## 2022-12-14 RX ORDER — PROPOFOL 10 MG/ML
INJECTION, EMULSION INTRAVENOUS AS NEEDED
Status: DISCONTINUED | OUTPATIENT
Start: 2022-12-14 | End: 2022-12-14

## 2022-12-14 RX ORDER — TORSEMIDE 10 MG/1
10 TABLET ORAL DAILY
Qty: 30 TABLET | Refills: 0 | Status: SHIPPED | OUTPATIENT
Start: 2022-12-15

## 2022-12-14 RX ORDER — DIPHENHYDRAMINE HYDROCHLORIDE 50 MG/ML
12.5 INJECTION INTRAMUSCULAR; INTRAVENOUS ONCE AS NEEDED
Status: DISCONTINUED | OUTPATIENT
Start: 2022-12-14 | End: 2022-12-14 | Stop reason: HOSPADM

## 2022-12-14 RX ORDER — CEFAZOLIN SODIUM 1 G/3ML
INJECTION, POWDER, FOR SOLUTION INTRAMUSCULAR; INTRAVENOUS AS NEEDED
Status: DISCONTINUED | OUTPATIENT
Start: 2022-12-14 | End: 2022-12-14

## 2022-12-14 RX ORDER — ONDANSETRON 2 MG/ML
INJECTION INTRAMUSCULAR; INTRAVENOUS AS NEEDED
Status: DISCONTINUED | OUTPATIENT
Start: 2022-12-14 | End: 2022-12-14

## 2022-12-14 RX ADMIN — ROCURONIUM BROMIDE 50 MG: 50 INJECTION INTRAVENOUS at 08:52

## 2022-12-14 RX ADMIN — ROCURONIUM BROMIDE 30 MG: 50 INJECTION INTRAVENOUS at 09:03

## 2022-12-14 RX ADMIN — Medication 25 MCG: at 10:28

## 2022-12-14 RX ADMIN — FENTANYL CITRATE 100 MCG: 50 INJECTION INTRAMUSCULAR; INTRAVENOUS at 08:51

## 2022-12-14 RX ADMIN — Medication 25 MCG: at 10:33

## 2022-12-14 RX ADMIN — PROPOFOL 300 MG: 10 INJECTION, EMULSION INTRAVENOUS at 08:51

## 2022-12-14 RX ADMIN — SODIUM CHLORIDE, SODIUM LACTATE, POTASSIUM CHLORIDE, AND CALCIUM CHLORIDE: .6; .31; .03; .02 INJECTION, SOLUTION INTRAVENOUS at 08:45

## 2022-12-14 RX ADMIN — LIDOCAINE HYDROCHLORIDE 100 MG: 10 INJECTION, SOLUTION EPIDURAL; INFILTRATION; INTRACAUDAL; PERINEURAL at 08:51

## 2022-12-14 RX ADMIN — NEOSTIGMINE METHYLSULFATE 3 MG: 1 INJECTION INTRAVENOUS at 09:58

## 2022-12-14 RX ADMIN — HYDROMORPHONE HYDROCHLORIDE 0.5 MG: 1 INJECTION, SOLUTION INTRAMUSCULAR; INTRAVENOUS; SUBCUTANEOUS at 09:30

## 2022-12-14 RX ADMIN — MORPHINE SULFATE 2 MG: 2 INJECTION, SOLUTION INTRAMUSCULAR; INTRAVENOUS at 12:16

## 2022-12-14 RX ADMIN — NICOTINE 1 PATCH: 14 PATCH, EXTENDED RELEASE TRANSDERMAL at 14:05

## 2022-12-14 RX ADMIN — FENTANYL CITRATE 100 MCG: 50 INJECTION INTRAMUSCULAR; INTRAVENOUS at 09:32

## 2022-12-14 RX ADMIN — CEFAZOLIN 3000 MG: 1 INJECTION, POWDER, FOR SOLUTION INTRAMUSCULAR; INTRAVENOUS at 08:58

## 2022-12-14 RX ADMIN — Medication 25 MCG: at 10:18

## 2022-12-14 RX ADMIN — Medication 25 MCG: at 10:23

## 2022-12-14 RX ADMIN — ONDANSETRON 4 MG: 2 INJECTION INTRAMUSCULAR; INTRAVENOUS at 09:34

## 2022-12-14 RX ADMIN — MIDAZOLAM 2 MG: 1 INJECTION INTRAMUSCULAR; INTRAVENOUS at 08:45

## 2022-12-14 RX ADMIN — GLYCOPYRROLATE 0.6 MCG: 0.2 INJECTION, SOLUTION INTRAMUSCULAR; INTRAVENOUS at 09:58

## 2022-12-14 NOTE — PLAN OF CARE
Problem: PAIN - ADULT  Goal: Verbalizes/displays adequate comfort level or baseline comfort level  Description: Interventions:  - Encourage patient to monitor pain and request assistance  - Assess pain using appropriate pain scale  - Administer analgesics based on type and severity of pain and evaluate response  - Implement non-pharmacological measures as appropriate and evaluate response  - Consider cultural and social influences on pain and pain management  - Notify physician/advanced practitioner if interventions unsuccessful or patient reports new pain  Outcome: Progressing     Problem: INFECTION - ADULT  Goal: Absence or prevention of progression during hospitalization  Description: INTERVENTIONS:  - Assess and monitor for signs and symptoms of infection  - Monitor lab/diagnostic results  - Monitor all insertion sites, i e  indwelling lines, tubes, and drains  - Monitor endotracheal if appropriate and nasal secretions for changes in amount and color  - [place] appropriate cooling/warming therapies per order  - Administer medications as ordered  - Instruct and encourage patient and family to use good hand hygiene technique  - Identify and instruct in appropriate isolation precautions for identified infection/condition  Outcome: Progressing  Goal: Absence of fever/infection during neutropenic period  Description: INTERVENTIONS:  - Monitor WBC    Outcome: Progressing     Problem: SAFETY ADULT  Goal: Patient will remain free of falls  Description: INTERVENTIONS:  - Educate patient/family on patient safety including physical limitations  - Instruct patient to call for assistance with activity   - Consult OT/PT to assist with strengthening/mobility   - Keep Call bell within reach  - Keep bed low and locked with side rails adjusted as appropriate  - Keep care items and personal belongings within reach  - Initiate and maintain comfort rounds  - Make Fall Risk Sign visible to staff  - Apply yellow socks and bracelet for high fall risk patients  - Consider moving patient to room near nurses station  Outcome: Progressing  Goal: Maintain or return to baseline ADL function  Description: INTERVENTIONS:  -  Assess patient's ability to carry out ADLs; assess patient's baseline for ADL function and identify physical deficits which impact ability to perform ADLs (bathing, care of mouth/teeth, toileting, grooming, dressing, etc )  - Assess/evaluate cause of self-care deficits   - Assess range of motion  - Assess patient's mobility; develop plan if impaired  - Assess patient's need for assistive devices and provide as appropriate  - Encourage maximum independence but intervene and supervise when necessary  - Involve family in performance of ADLs  - Assess for home care needs following discharge   - Consider OT consult to assist with ADL evaluation and planning for discharge  - Provide patient education as appropriate  Outcome: Progressing  Goal: Maintains/Returns to pre admission functional level  Description: INTERVENTIONS:  - Perform BMAT or MOVE assessment daily    - Set and communicate daily mobility goal to care team and patient/family/caregiver     - Collaborate with rehabilitation services on mobility goals if consulted  - Out of bed for toileting  - Record patient progress and toleration of activity level   Outcome: Progressing

## 2022-12-14 NOTE — CONSULTS
UROLOGY CONSULTATION NOTE     Patient Identifiers: Jalyn Gonsales (MRN [id number])  Service Requesting Consultation: Internal medicine  Service Providing Consultation:  Urology, Emmy Ravi MD    Date of Service: 12/14/2022    Reason for Consultation: Testicular mass, retroperitoneal lymphadenopathy      ASSESSMENT:     1  Multifocal left testicular masses  -Ultrasound and sickle exam is concerning for neoplasm  -Tumor markers negative    #2 retroperitoneal lymphadenopathy  -Periaortic lymph nodes present in the abdomen, and down to the level of the left external iliac    It was a pleasure to evaluate the patient  I sat with him at the bedside this morning  We reviewed his clinical history, physical exam and radiographic findings as well as his lab work  We discussed that the constellation of findings is concerning for a neoplasm  In my opinion differential diagnosis would include both node positive testicular cancer, as well as potential for lymphoma with metastases to the left testicle  We discussed that tissue sampling is indicated  As testicular cancer is high on the differential, standard of care would be to proceed with a left radical inguinal orchiectomy  We discussed that this is both a diagnostic and therapeutic intervention  We discussed that due to the presence of tameka metastases the patient will certainly require systemic therapy once a tissue diagnosis has been rendered  He understands all of this  Discussed the goals risks and benefits of radical orchiectomy  Risks include but not limited to bleeding, infection, hernia, pain, vascular or nerve injury  PLAN:     -Proceed to the operating for left radical inguinal orchiectomy today  -Patient will be stable for hospital discharge following today's procedure  -I will arrange for follow-up with myself as well as medical oncology following discharge    Thank you for allowing me to participate in this patients’ care    Please do not hesitate to call with any additional questions  I have spent 68 minutes with Patient  today in which greater than 50% of this time was spent in counseling/coordination of care regarding Diagnostic results, Prognosis, Risks and benefits of tx options, Importance of tx compliance and Impressions  Please note this time includes cumulative time on the day of encounter, including reviewing medical records and/or coordinating care among the patient's other specialists  Verner Squires, MD    History of Present Illness:     Ren Raza is a 30 y o  old with a history of bipolar depression who has gained approximate 120 pounds over the past few months  He has also had left abdominal pain and arthralgias in his lower extremity and dyspnea  He presented to the [address] [place] [address] emergency department on December 10 for the above  He had extensive evaluation including a CT abdomen pelvis, CT chest   CT demonstrated para-aortic lymphadenopathy extending down to the left external iliac  Scrotal ultrasound demonstrates multifocal left intratesticular masses  No pulmonary metastases are noted  Urology was consulted and recommended transfer to our operative campus at [facility] for discussion of an orchiectomy  Tumor markers are negative  Past Medical, Past Surgical History:     Past Medical History:   Diagnosis Date   • Depression    • Psychiatric disorder     bipolar   :    History reviewed   No pertinent surgical history :    Medications, Allergies:     Current Facility-Administered Medications:   •  acetaminophen (TYLENOL) tablet 650 mg, 650 mg, Oral, Q6H PRN, Urszula Goldberg MD  •  albuterol inhalation solution 2 5 mg, 2 5 mg, Nebulization, Q6H PRN, Urszula Goldberg MD  •  amLODIPine (NORVASC) tablet 10 mg, 10 mg, Oral, Daily, Urszula Goldberg MD  •  heparin (porcine) subcutaneous injection 5,000 Units, 5,000 Units, Subcutaneous, Q8H [address], Urszula Goldberg MD  • labetalol (NORMODYNE) injection 10 mg, 10 mg, Intravenous, Q6H PRN, Evaristo Dakins, MD  •  losartan (COZAAR) tablet 50 mg, 50 mg, Oral, Daily, Evaristo Dakins, MD  •  morphine injection 2 mg, 2 mg, Intravenous, Q4H PRN, Evaristo Dakins, MD  •  nicotine (NICODERM CQ) 14 mg/24hr TD 24 hr patch 1 patch, 1 patch, Transdermal, Daily, Evaristo Dakins, MD  •  sodium chloride tablet 2 g, 2 g, Oral, TID With Meals, Evaristo Dakins, MD  •  tamsulosin (FLOMAX) capsule 0 4 mg, 0 4 mg, Oral, Daily With Jose Salas MD  •  torsemide [facility]) tablet 10 mg, 10 mg, Oral, Daily, Evaristo Dakins, MD    Allergies:  No Known Allergies:    Social and Family History:   Social History:   Social History     Tobacco Use   • Smoking status: Former     Packs/day: 1 00     Types: Cigarettes     Quit date: 2022     Years since quittin 0   • Smokeless tobacco: Never   Vaping Use   • Vaping Use: Never used   Substance Use Topics   • Alcohol use: Not Currently     Comment: 2 cases of beer daily, stopped 3 years ago   • Drug use: Yes     Types: Marijuana     Comment: daily     Social History     Tobacco Use   Smoking Status Former   • Packs/day: 1 00   • Types: Cigarettes   • Quit date: 2022   • Years since quittin 0   Smokeless Tobacco Never       Family History:  History reviewed  No pertinent family history :     Review of Systems:     General: Fever, chills, or night sweats: negative  Cardiac: Negative for chest pain  Pulmonary: positive for shortness of breath  Gastrointestinal: Abdominal pain positive  Nausea, vomiting, or diarrhea negative,  Genitourinary: See HPI above  Patient does not have hematuria  All other systems queried were negative  Physical Exam:   General: Patient is pleasant and in NAD   Awake and alert  BP (!) 168/107   Pulse 98   Temp 97 8 °F (36 6 °C)   Wt 136 kg (298 lb 15 1 oz)   SpO2 95%   BMI 36 39 kg/m²   Cardiac: Peripheral edema: negative  Pulmonary: Non-labored breathing  Abdomen: Soft, non-tender, non-distended  No surgical scars  No masses, tenderness, hernias noted  Genitourinary: Negative CVA tenderness, negative suprapubic tenderness  (Male the left testes is globally firm and enlarged    Labs:     Lab Results   Component Value Date    HGB 14 6 12/14/2022    HCT 41 2 12/14/2022    WBC 11 31 (H) 12/14/2022     12/14/2022   ]    Lab Results   Component Value Date    K 3 5 12/14/2022    CL 94 (L) 12/14/2022    CO2 28 12/14/2022    BUN 14 12/14/2022    CREATININE 0 85 12/14/2022    CALCIUM 9 2 12/14/2022   ]    Imaging:   I personally reviewed the images and report of the following studies, and reviewed them with the patient:    FINDINGS:     ABDOMEN     LOWER CHEST:  No clinically significant abnormality identified in the visualized lower chest      LIVER/BILIARY TREE:  Diffuse steatosis      GALLBLADDER:  No calcified gallstones  No pericholecystic inflammatory change      SPLEEN:  Unremarkable      PANCREAS:  Unremarkable      ADRENAL GLANDS:  Unremarkable      KIDNEYS/URETERS:  Few low-attenuation lesions which are too small to characterize but statistically benign      STOMACH AND BOWEL:  Unremarkable      APPENDIX:  No findings to suggest appendicitis      ABDOMINOPELVIC CAVITY:  Abnormal-appearing retroperitoneal lymph nodes, with an index aortocaval lymph node measuring 2 2 x 2 2 cm    In index retrocaval lymph node measures 1 6 x 1 5 cm      There is an enlarged left external iliac lymph node which measures 2 x 2 cm (201, 63)      Nonspecific fat stranding on the right anterior to the common and external iliac artery which is nonspecific      VESSELS:  Unremarkable for patient's age      PELVIS     REPRODUCTIVE ORGANS:  Unremarkable for patient's age      URINARY BLADDER:  Unremarkable      ABDOMINAL WALL/INGUINAL REGIONS:  Fat-containing umbilical hernia      OSSEOUS STRUCTURES:  No destructive skeletal lesions      IMPRESSION:     Retroperitoneal lymphadenopathy which is concerning for a malignant process, specifically metastatic testicular cancer given the findings on prior scrotal ultrasound  Finding discussed with Dr Elijah Mehta at the time of this dictation      Hepatic steatosis      Nonspecific fat stranding along the right common and external iliac vessels, of uncertain etiology      FINDINGS:     PULMONARY ARTERIES:  No pulmonary embolus       LUNGS:  Lungs clear      AIRWAYS: No significant filling defects      PLEURA:  Unremarkable      HEART/GREAT VESSELS:  Normal for age      MEDIASTINUM AND NIMA:  Unremarkable      CHEST WALL AND LOWER NECK: Unremarkable      UPPER ABDOMEN:  Unremarkable      OSSEOUS STRUCTURES:  Old right clavicle fracture      IMPRESSION:     No pulmonary embolus      No acute pulmonary disease      Nothing to indicate metastatic disease         FINDINGS:     TESTES:      RIGHT testis = 4 4 x 2 0 x 2 6 cm  Volume 11 8 mL  Normal contour with homogeneous smooth echotexture  No intratesticular mass lesion or calcifications      LEFT testis = 6 8 x 3 8 x 2 7 cm  Volume 36 7 mL  Diffusely abnormal heterogeneity as seen on prior study with hypovascularity compared to the right  Several individual hypoechoic nodules can be measured the largest measuring 2 3 x 1 8 x 2 0 cm in the upper pole     EPIDIDYMIDES:   Normal Size  Doppler ultrasound demonstrates normal blood flow  No epididymal lesions      HYDROCELE:  Left hydrocele noted      VARICOCELE:  None present      SCROTUM:  Scrotal thickness and appearance within normal limits  No evidence for extratesticular mass or hernia demonstrated      IMPRESSION:     Left testicular nodules consistent with neoplasm with small hydrocele

## 2022-12-14 NOTE — DISCHARGE INSTRUCTIONS
Hemanth Swanson:    You are diagnosed with a left testicular mass and retroperitoneal lymph nodes [place] for cancer  You were treated with a left radical inguinal orchiectomy  Your surgery went very well  You will hear from our nurse navigator to be scheduled for a follow-up visit with Dr Missy Abdul as well as a medical oncologist plan for next steps in your treatment plan  Please take your medications as prescribed with caution for comfort  Please call with any questions or concerns      Feliciano Fay MD,PhD  [facility]  [phone]

## 2022-12-14 NOTE — ASSESSMENT & PLAN NOTE
Reports increased SOB and FITZPATRICK over the past 3 weeks  Poor exercise tolerance  Denies chest pain, cough, wheezing, orthopnea, PND, edema  · Unclear etiology, consider underlying obstructive lung disease given smoking history  · COVID/flu/RSV: negative   · CXR: No acute cardiopulmonary disease  · CTA chest: No PE, no acute pulmonary disease, no metastatic disease noted  · Venous duplex US lower extremities: No evidence of DVT, superficial thrombophlebitis  · Echo: EF 60%  Normal systolic/diastolic function     · Albuterol p r n , supportive care  · Recommend PFTs at discharge r/o underlying obstructive lung dz w/ tobacco use  · Continue to monitor respiratory status

## 2022-12-14 NOTE — ASSESSMENT & PLAN NOTE
Persistent hyponatremia during admission  Na today decreased to 127    · Am Cortisol/TSH: Normal  · Urine Os: 729/ Urine Na: 73  · Patient clinically euvolemic, fair oral intake per patient  · Nephrology consulted:  · Work-up suggestive of SIADH, likely due to suspected malignancy  · Initiate 1000 mL fluid restriction, start sodium chloride tabs 2 g TID, add torsemide 10 mg daily  · Check cortrosyn stimulation test  · Goal sodium 133 by tomorrow morning  · Nephrology re-consulted at [facility]   · Monitor BMP as indicated

## 2022-12-14 NOTE — H&P
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   · Monitor BMP as indicated    Urinary hesitancy  Assessment & Plan  • Complains of urinary hesitancy and intermittency  • UA negative   • Continue Flomax  • Monitor on urinary retention protocol     Hypertension  Assessment & Plan  · With accelerated hypertension, BP consistently >150/100s   · No prior diagnosis of hypertension  · BP reviewed, most recent 160/90, somewhat improved diastolic  Asymptomatic  · C/w losartan (increased to 50mg daily)  · Add low-dose Amlodipine to start (5mg daily)  · Stress/Anxiety likely contributing  · Improved with PO ativan  · C/w IV Labetalol PRN   · Continue to monitor BP closely    Retroperitoneal lymphadenopathy  Assessment & Plan  Patient presents with night sweats, unexplained weight gain, early satiety, abdominal pain  Hx of testicular mass not improved with abx treatment, patient did not follow-up with Urology     · Likely 2/2 suspected metastatic disease  · CT A/P: Retroperitoneal lymphadenopathy concerning for malignant process, specifically metastatic testicular cancer given the findings on prior scrotal ultrasound  · CTA Chest: Nothing to indicate metastatic disease  · AFP/ LDH: WNL  · [place] Tumor Marker: Pending  · Urology Consulted:   · Anticipate IR consult for lymph node biopsy pending U/S and tumor marker results  · Transfer to [facility] for Urology and possible OR intervention 12/14    Unexplained weight gain  Assessment & Plan  • Reports 120lbs weight gain over past 2 months  ? Denies change in diet/exercise  • Likely due to metastatic disease   • Lipid panel: Cholesterol 183/Triglycerides 280/ HDL 45/   • TSH: WNL   • Am cortisol/HgbA1: WNL    Tobacco use  Assessment & Plan  • 1ppd x 15 years  • Nicotine patch ordered  • Educated on smoking cessation    Umbilical hernia without obstruction and without gangrene  Assessment & Plan  • POA, also seen on CT a/p without significant findings  • No signs of strangulation/incarceration, stable  • Recommend outpatient follow-up with general surgery for evaluation    Shortness of breath  Assessment & Plan  Reports increased SOB and FITZPATRICK over the past 3 weeks  Poor exercise tolerance  Denies chest pain, cough, wheezing, orthopnea, PND, edema  · Unclear etiology, consider underlying obstructive lung disease given smoking history  · COVID/flu/RSV: negative   · CXR: No acute cardiopulmonary disease  · CTA chest: No PE, no acute pulmonary disease, no metastatic disease noted  · Venous duplex US lower extremities: No evidence of DVT, superficial thrombophlebitis  · Echo: EF 60%  Normal systolic/diastolic function  · Albuterol p r n , supportive care  · Recommend PFTs at discharge r/o underlying obstructive lung dz w/ tobacco use  · Continue to monitor respiratory status    * Testicular mass  Assessment & Plan  • Scrotal ultrasound: Left testicular nodules consistent with neoplasm with small hydrocele  • Possibly primary etiology for suspected metastatic disease  • See additional plan above, pending transfer to \[facility]\""      VTE Pharmacologic Prophylaxis: VTE Score: 7 High Risk (Score >/= 5) - Pharmacological DVT Prophylaxis Ordered: heparin  Sequential Compression Devices Ordered  Code Status: Level 1 - Full Code   Discussion with family: Patient  Anticipated Length of Stay: Patient will be admitted on an inpatient basis with an anticipated length of stay of greater than 2 midnights secondary to As above  Total Time for Visit, including Counseling / Coordination of Care: 70 minutes Greater than 50% of this total time spent on direct patient counseling and coordination of care  Chief Complaint: testicular mass, increased weight gain, dyspnea     History of Present Illness:  Tara Roach is a 30 y o  male with a PMH of  testicular mass who [facility] to [facility] on [facility]10 with shortness of breath, dyspnea on exertion and weight gain as well as testicular mass  Work-up showed RP lymphadenopathy c/f malignancy, and he is being transferred to [facility] for left orchiectomy with urology  No concerns upon transfer  Review of Systems:  Review of Systems    Past Medical and Surgical History:   Past Medical History:   Diagnosis Date   • Depression    • Psychiatric disorder     bipolar       History reviewed  No pertinent surgical history  Meds/Allergies:  Prior to Admission medications    Medication Sig Start Date End Date Taking?  Authorizing Provider   cyclobenzaprine (FLEXERIL) 10 mg tablet Take 1 tablet (10 mg total) by mouth 2 (two) times a day as needed for muscle spasms  Patient not taking: Reported on 6/18/2021 9/13/20   Vidya Quiroz MD   gabapentin (NEURONTIN) 300 mg capsule Take 1 capsule (300 mg total) by mouth daily for 7 days one capsule on day 1 and then one capsule twice a day on day 2 and then 3 times a day on day 3 and beyond 7/19/22 7/26/22  Jody Red DO   gabapentin (Neurontin) 300 mg capsule Take 1 capsule (300 mg total) by mouth 3 (three) times a day Take 1 capsule on day 1,  Then take 2 capsules on day 2, Then take 3 capsules on day 3 and every day after that as instructed by your doctor  Patient not taking: Reported on 10/19/2022 7/19/22   Jorge Young MD   naproxen (NAPROSYN) 500 mg tablet Take 1 tablet (500 mg total) by mouth 2 (two) times a day with meals  Patient not taking: Reported on 2021   Ramos Badillo MD   predniSONE 20 mg tablet Take 2 tablets (40 mg total) by mouth daily  Patient not taking: Reported on 12/10/2022 10/19/22   Raheem Haynes PA-C     I have reviewed home medications using recent Epic encounter  Allergies: No Known Allergies    Social History:  Marital Status: Single   Occupation: attention 2 detail  Patient Pre-hospital Living Situation: Home  Patient Pre-hospital Level of Mobility: walks  Patient Pre-hospital Diet Restrictions: none  Substance Use History:   Social History     Substance and Sexual Activity   Alcohol Use Not Currently    Comment: 2 cases of beer daily, stopped 3 years ago     Social History     Tobacco Use   Smoking Status Former   • Packs/day: 1 00   • Types: Cigarettes   • Quit date: 2022   • Years since quittin 0   Smokeless Tobacco Never     Social History     Substance and Sexual Activity   Drug Use Yes   • Types: Marijuana    Comment: daily       Family History:  History reviewed  No pertinent family history  Physical Exam:     Vitals:   Blood Pressure: (!) 168/107 (22)  Pulse: 98 (22)  Temperature: 97 8 °F (36 6 °C) (22)  SpO2: 95 % (22)    Physical Exam  Vitals reviewed  Constitutional:       General: He is not in acute distress  Appearance: He is not ill-appearing or toxic-appearing  HENT:      Nose: Nose normal       Mouth/Throat:      Mouth: Mucous membranes are moist    Eyes:      General: No scleral icterus  Cardiovascular:      Rate and Rhythm: Normal rate and regular rhythm  Pulmonary:      Effort: Pulmonary effort is normal  No respiratory distress  Breath sounds: Normal breath sounds     Abdominal:      General: Bowel sounds are normal       Palpations: Abdomen is soft  Tenderness: There is no abdominal tenderness  There is no guarding  Hernia: A hernia is present  Musculoskeletal:      Right lower leg: No edema  Left lower leg: No edema  Skin:     General: Skin is warm and dry  Neurological:      General: No focal deficit present  Mental Status: He is alert and oriented to person, place, and time  Psychiatric:         Mood and Affect: Mood normal          Behavior: Behavior normal             Additional Data:     Lab Results:  Results from last 7 days   Lab Units 12/13/22  0441   WBC Thousand/uL 10 03   HEMOGLOBIN g/dL 13 9   HEMATOCRIT % 39 3   PLATELETS Thousands/uL 256   NEUTROS PCT % 69   LYMPHS PCT % 21   MONOS PCT % 8   EOS PCT % 1     Results from last 7 days   Lab Units 12/13/22  1226 12/11/22  0444 12/10/22  1753   SODIUM mmol/L 125*   < > 129*   POTASSIUM mmol/L 3 6   < > 3 6   CHLORIDE mmol/L 92*   < > 92*   CO2 mmol/L 24   < > 29   BUN mg/dL 12   < > 13   CREATININE mg/dL 0 77   < > 0 88   ANION GAP mmol/L 9   < > 8   CALCIUM mg/dL 9 2   < > 9 4   ALBUMIN g/dL  --   --  4 3   TOTAL BILIRUBIN mg/dL  --   --  0 34   ALK PHOS U/L  --   --  92   ALT U/L  --   --  51   AST U/L  --   --  23   GLUCOSE RANDOM mg/dL 114   < > 86    < > = values in this interval not displayed  Results from last 7 days   Lab Units 12/10/22  1753   INR  0 88         Results from last 7 days   Lab Units 12/10/22  1753   HEMOGLOBIN A1C % 5 2     Results from last 7 days   Lab Units 12/10/22  1753   LACTIC ACID mmol/L 0 9       Lines/Drains:  Invasive Devices     Peripheral Intravenous Line  Duration           Peripheral IV 12/10/22 Right Antecubital 3 days                    Imaging: Reviewed radiology reports from this admission including: chest CT scan and abdominal/pelvic CT  No orders to display       EKG and Other Studies Reviewed on Admission:   · EKG: No EKG obtained      ** Please Note: This note has been constructed using a voice recognition system   **

## 2022-12-14 NOTE — QUICK NOTE
IR Quick Note:     No IR periaortic lymph node biopsy needed per Dr Geoff Verduzco, order discontinued  Please reach out to IR for any further questions or concerns       [address] [place] [address] Wil Rodriguez

## 2022-12-14 NOTE — ASSESSMENT & PLAN NOTE
• POA, also seen on CT a/p without significant findings  • No signs of strangulation/incarceration, stable  • Recommend outpatient follow-up with general surgery for evaluation

## 2022-12-14 NOTE — UTILIZATION REVIEW
Initial Clinical Review    Admission: Date/Time/Statement:   Admission Orders (From admission, onward)     Ordered        12/13/22 2153  Inpatient Admission  Once                      Orders Placed This Encounter   Procedures   • Inpatient Admission     Standing Status:   Standing     Number of Occurrences:   1     Order Specific Question:   Level of Care     Answer:   Med Surg [16]     Order Specific Question:   Estimated length of stay     Answer:   More than 2 Midnights     Order Specific Question:   Certification     Answer:   I certify that inpatient services are medically necessary for this patient for a duration of greater than two midnights  See H&P and MD Progress Notes for additional information about the patient's course of treatment  Initial Presentation: 30 y o  male with PMHx of testicular mass presents to [facility] as a transfer from [facility] ED where he initially presented with sod and dyspnea on exertion, night sweats, unexplained weight gain, early satiety, abdominal painnd and weight gain  W/u in ED showed retroperitoneal lymphadenopathy c/f malignancy, and pt transferred to [facility] for left orchiectomy with urology  Pt also hyponatremic on presentation now further decreased to 127  BP elevated  Admit inpatient to M/S/Tele unit -- Nephrology consulted for hyponatremia -- w/u at [facility] 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  IVF's, sodium tabs, torsemide added  Cortrosyn stim test ordered  Goal  tomorrow morning  BMP in AM  continue Flomax  Urinary retention protocol  Continue losartan, amlodipine, prn IV labetalol for BP  Urology consulted with possible OR intervention  Npo currently  Nicotine patch  Sob etiology unknown, previous w/u at [facility] neg so far  Albuterol prn  Supportive care  Date: 12/14   Day 2:     Urology consult 12/14 -- Multifocal left testicular masses -- US and sickle exam is concerning for neoplasm  Tumor markers negative    Retroperitoneal lymphadenopathy -- Periaortic lymph nodes present in the abdomen, and down to the level of the left external iliac  -Proceed to the operating for left radical inguinal orchiectomy today    Operative Report  DATE OF PROCEDURE:   12/14/2022   PROCEDURES PERFORMED:  Left Radical inguinal orchiectomy  ANESTHESIA: General        Nephrology note -- A: Acute hyponatremia, hypoosmolar:  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 with retroperitoneal lymphadenopathy, possible presenting component of polydipsia   this AM, appropriate of correction   - Continue salt tablets 2 g 3 times daily and torsemide 10 mg daily  Fluid restriction of 1 L/day  Received 800 ml LR total perioperatively  Off IVF  Strict I/O  Check BMP @ 1300 and in am  Goal to raise sodium by 8 meq in the next  24 hours  Goal serum sodium 138 by  0500 12/15/2022  avoid overcorrection >8 meq  Creatinine currently at baseline, continue to monitor closely  BP well controlled    Continue current po meds:  Amlodipine 10 mg daily, losartan 50 mg daily, torsemide 10 mg daily        Wt Readings from Last 1 Encounters:   12/14/22 136 kg (298 lb 15 1 oz)     Additional Vital Signs:   Date/Time Temp Pulse Resp BP MAP (mmHg) SpO2 O2 Device   12/14/22 1033 97 3 °F (36 3 °C) Abnormal  98 18 140/79 90 97 % None (Room air)   12/14/22 1030 -- 100 16 140/79 90 96 % --   12/14/22 1023 -- 102 18 117/79 95 96 % --   12/14/22 1015 -- 116 Abnormal  14 117/79 95 95 % --   12/14/22 1012 98 °F (36 7 °C) 114 Abnormal  18 117/79 95 95 % None (Room air)   12/14/22 07:32:31 97 6 °F (36 4 °C) 98 18 167/106 Abnormal  126 96 % None (Room air)   12/14/22 0022 -- -- -- -- -- -- None (Room air)   12/13/22 2330 -- -- -- -- -- 96 % None (Room air)   12/13/22 2210 97 8 °F (36 6 °C) 98 -- 168/107 Abnormal  127 95 % --   12/13/22 22:09:09 97 8 °F (36 6 °C) 99 -- 168/107 Abnormal  127 96 % --     Pertinent Labs/Diagnostic Test Results:   IR biopsy lymph node    (Results Pending) Results from last 7 days   Lab Units 12/14/22 0432 12/13/22 0441 12/12/22  0434 12/11/22 0444 12/10/22  1753   WBC Thousand/uL 11 31* 10 03 9 71 9 50 10 46*   HEMOGLOBIN g/dL 14 6 13 9 14 2 14 0 14 6   HEMATOCRIT % 41 2 39 3 40 0 39 4 41 1   PLATELETS Thousands/uL 292 256 274 276 293   NEUTROS ABS Thousands/µL 8 23* 6 82  --  5 88 6 66     Results from last 7 days   Lab Units 12/14/22  0432 12/13/22  2353 12/13/22  1226 12/13/22 0441 12/12/22 0434 12/11/22 0444 12/10/22  1753   SODIUM mmol/L 130* 129* 125* 127* 129*   < > 129*   POTASSIUM mmol/L 3 5 3 5 3 6 3 9 3 9   < > 3 6   CHLORIDE mmol/L 94* 95* 92* 94* 94*   < > 92*   CO2 mmol/L 28 24 24 24 25   < > 29   ANION GAP mmol/L 8 10 9 9 10   < > 8   BUN mg/dL 14 16 12 14 10   < > 13   CREATININE mg/dL 0 85 0 88 0 77 0 78 0 76   < > 0 88   EGFR ml/min/1 73sq m 112 111 117 116 118   < > 111   CALCIUM mg/dL 9 2 9 0 9 2 9 1 9 4   < > 9 4   MAGNESIUM mg/dL 2 6  --   --   --   --   --  2 0   PHOSPHORUS mg/dL  --   --   --   --   --   --  3 3    < > = values in this interval not displayed       Results from last 7 days   Lab Units 12/14/22 0432 12/13/22 2353 12/13/22  1226 12/13/22 0441 12/12/22  0434 12/11/22 0444 12/10/22  1753   GLUCOSE RANDOM mg/dL 105 111 114 91 89 91 86     Results from last 7 days   Lab Units 12/12/22  0434   OSMOLALITY, SERUM mmol/*     Results from last 7 days   Lab Units 12/11/22  0044 12/10/22  2243   HS TNI 0HR ng/L  --  3   HS TNI 2HR ng/L 3  --    HSTNI D2 ng/L 0  --      Results from last 7 days   Lab Units 12/11/22 1957   D-DIMER QUANTITATIVE ug/ml FEU <0 27     Results from last 7 days   Lab Units 12/12/22  0434 12/11/22  0007   OSMOLALITY, SERUM mmol/*  --    OSMO UR mmol/KG  --  729     Results from last 7 days   Lab Units 12/11/22  0007 12/10/22  1753   CLARITY UA   --  Clear   COLOR UA   --  Yellow   SPEC GRAV UA   --  1 020   PH UA   --  6 0   GLUCOSE UA mg/dl  --  Negative   KETONES UA mg/dl  --  Negative BLOOD UA   --  Negative   PROTEIN UA mg/dl  --  Negative   NITRITE UA   --  Negative   BILIRUBIN UA   --  Negative   UROBILINOGEN UA E U /dl  --  0 2   LEUKOCYTES UA   --  Negative   SODIUM UR  73  --        Past Medical History:   Diagnosis Date   • Depression    • Psychiatric disorder     bipolar     Present on Admission:  • Shortness of breath  • Umbilical hernia without obstruction and without gangrene  • Tobacco use  • Unexplained weight gain  • Retroperitoneal lymphadenopathy  • Hypertension  • Urinary hesitancy  • Hyponatremia  • Testicular mass      Admitting Diagnosis: Testicular mass [N50 89]  Age/Sex: 30 y o  male  Admission Orders:  Scheduled Medications:  [MAR Hold] amLODIPine, 10 mg, Oral, Daily  [MAR Hold] heparin (porcine), 5,000 Units, Subcutaneous, Q8H [address]  [MAR Hold] losartan, 50 mg, Oral, Daily  [MAR Hold] nicotine, 1 patch, Transdermal, Daily  [MAR Hold] sodium chloride, 2 g, Oral, TID With Meals  [MAR Hold] tamsulosin, 0 4 mg, Oral, Daily With Dinner  [facility] Hold] torsemide, 10 mg, Oral, Daily      Continuous IV Infusions:     PRN Meds:  [MAR Hold] acetaminophen, 650 mg, Oral, Q6H PRN  [MAR Hold] albuterol, 2 5 mg, Nebulization, Q6H PRN  diphenhydrAMINE, 12 5 mg, Intravenous, Once PRN  fentaNYL, 25 mcg, Intravenous, Q5 Min PRN  HYDROmorphone, 0 5 mg, Intravenous, Q5 Min PRN  [MAR Hold] labetalol, 10 mg, Intravenous, Q6H PRN  [MAR Hold] morphine injection, 2 mg, Intravenous, Q4H PRN  ondansetron, 4 mg, Intravenous, Once PRN        INPATIENT CONSULT TO IR  IP CONSULT TO UROLOGY  IP CONSULT TO NEPHROLOGY    Network Utilization Review Department  ATTENTION: Please call with any questions or concerns to [phone] and carefully listen to the prompts so that you are directed to the right person   All voicemails are confidential   Camilla Grand all requests for admission clinical reviews, approved or denied determinations and any other requests to dedicated fax number below belonging to the campus where the patient is receiving treatment   List of dedicated fax numbers for the Facilities:  [address] [place] [address] DENIALS (Administrative/Medical Necessity) [phone]   [address] (Maternity/NICU/Pediatrics) [phone]   [address] [phone]   Niyah Angulo [address] [phone]   [address] [facility] [phone]   1552 [facility]3] [place]anka [facility] 134 815 [facility] [phone]

## 2022-12-14 NOTE — ASSESSMENT & PLAN NOTE
Patient presents with night sweats, unexplained weight gain, early satiety, abdominal pain  Hx of testicular mass not improved with abx treatment, patient did not follow-up with Urology  · Likely 2/2 suspected metastatic disease  · CT A/P: Retroperitoneal lymphadenopathy concerning for malignant process, specifically metastatic testicular cancer given the findings on prior scrotal ultrasound  · CTA Chest: Nothing to indicate metastatic disease  · AFP/ LDH: WNL  · [place] Tumor Marker: Pending  · Urology Consulted:   · No further work-up  We will follow-up with pathology from orchiectomy to guide treatment as per urology recommendations  · Status post surgical intervention  Orchiectomy

## 2022-12-14 NOTE — TELEPHONE ENCOUNTER
This is a new patient with left testicular mass as well as retroperitoneal lymphadenopathy  He is status post left orchiectomy performed as an inpatient    Please schedule a follow-up visit with me in 2 to 3 weeks for pathology review and wound check  Please also schedule a consultation with medical oncology (order has been placed)  Patient is aware that he will require systemic therapy due to his retroperitoneal disease, med onc visit should be scheduled once his pathology has finalized

## 2022-12-14 NOTE — ASSESSMENT & PLAN NOTE
• Scrotal ultrasound: Left testicular nodules consistent with neoplasm with small hydrocele  • Possibly primary etiology for suspected metastatic disease  • Discussed with urology  Patient okay for discharge  • We will follow-up on pathology from orchiectomy to guide treatment with urology    • Tentative discharge today

## 2022-12-14 NOTE — ANESTHESIA POSTPROCEDURE EVALUATION
Post-Op Assessment Note    CV Status:  Stable  Pain Score: 4    Pain management: adequate     Mental Status:  Alert and awake   Hydration Status:  Euvolemic   PONV Controlled:  Controlled   Airway Patency:  Patent      Post Op Vitals Reviewed: Yes            No notable events documented      BP  117/79   Temp   98 0   Pulse  116   Resp   18   SpO2 95% ra

## 2022-12-14 NOTE — CONSULTS
Please see full consult from 12/13/2022 by Dr Basilio Stover  Patient transferred from [address] [place] [address] to [facility], now status post L orchiectomy  We will continue to follow during hospitalization    Please see progress note from today, 12/14/2022

## 2022-12-14 NOTE — ASSESSMENT & PLAN NOTE
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

## 2022-12-14 NOTE — ASSESSMENT & PLAN NOTE
• Reports 120lbs weight gain over past 2 months  ? Denies change in diet/exercise     • Likely due to metastatic disease   • Lipid panel: Cholesterol 183/Triglycerides 280/ HDL 45/   • TSH: WNL   • Am cortisol/HgbA1: WNL

## 2022-12-14 NOTE — ANESTHESIA PREPROCEDURE EVALUATION
Procedure:  ORCHIECTOMY INGUINAL (Left: Groin)    Relevant Problems   CARDIO   (+) Hypertension      PULMONARY   (+) Shortness of breath      Other   (+) Retroperitoneal lymphadenopathy      Testicular mass  Obese (BMI 36) - 12#0 wt gain/2months, unchanged diet/exercise; HTN  smoker    12/2022: normal biventricular systolic function    Cr 8 73, hgb 14 6, plt 292    Physical Exam    Airway      TM Distance: >3 FB  Neck ROM: full     Dental       Cardiovascular      Pulmonary      Other Findings  edentulous      Anesthesia Plan  ASA Score- 3     Anesthesia Type- general with ASA Monitors  Additional Monitors:   Airway Plan: ETT  Comment: General anesthesia, endotracheal tube; standard ASA monitors  Risks and benefits discussed with patient; patient consented and agrees to proceed  I saw and evaluated the patient  If seen with CRNA, we have discussed the anesthetic plan and I am in agreement that the plan is appropriate for the patient          Plan Factors-    Chart reviewed  Existing labs reviewed  Induction- intravenous  Postoperative Plan- Plan for postoperative opioid use  Planned trial extubation    Informed Consent- Anesthetic plan and risks discussed with patient  I personally reviewed this patient with the CRNA  Discussed and agreed on the Anesthesia Plan with the CRNA  Vaibhav Blas

## 2022-12-14 NOTE — ASSESSMENT & PLAN NOTE
• Complains of urinary hesitancy and intermittency  • UA negative   • Continue Flomax  • Monitor on urinary retention protocol

## 2022-12-14 NOTE — OP NOTE
Operative Note     PATIENT:  Hank Jones (MRN [id number])    DATE OF PROCEDURE:   12/14/2022    PRE-OP DIAGNOSIS:   1) multifocal focal left testicular masses  2) retroperitoneal lymphadenopathy    POST-OP DIAGNOSIS:   1) multifocal focal left testicular masses  2) retroperitoneal lymphadenopathy    PROCEDURES PERFORMED:  Left Radical inguinal orchiectomy    SURGEON:  Archana Mcintyre MD    ASSISTANTS:    NOTE:  There were no qualified teaching residents to assist with this case    ANESTHESIA: General     COMPLICATIONS:   None    ANTIBIOTICS:  Cephazolin    INTRAOPERATIVE THROMBOEMBOLISM PROPHYLAXIS:  Pneumatic compression stockings     INDICATIONS FOR PROCEDURE:  Hank Jones is an 30 y o  old male who presents with a firm left testicle, with an ultrasound that demonstrates multiple hypervascular masses the largest measuring 2 3 cm  All of these are confined to the left gonad  In addition staging imaging reveals periaortic lymphadenopathy which extends down to the level of the left common iliac    CT shows no visceral or pulmonary mets  All of the patient's tumor markers are negative  I personally evaluated the patient and discussed my concern for metastatic testicular cancer  We also discussed that lymphoma is on the differential diagnosis  I recommended a left radical inguinal orchiectomy as standard of care and to facilitate a tissue diagnosis  We discussed the procedure in detail, the risks, the recovery time and the patient provided informed consent to proceed  PROCEDURE IN DETAIL:   The patient was identified and brought to the OR  Antibiotic prophylaxis and DVT prophylaxis were administered  They were placed in the comfortable supine position with care to pad all pressure points  The scrotal hair was clipped and they were prepped and draped in the usual sterile fashion using ChloraPrep    A surgical time out was performed with all in the room in agreement with the correct patient, procedure, indications, and laterality  A 4 cm oblique incision is introduced two thirds of the way between the anterior superior iliac spine and the to pubic tubercle  This is carried down to the level of the transversus fascia  The spermatic cord is visualized within the inguinal canal and the fascia is incised sharply to the level of the external inguinal ring  The spermatic cord was then encircled with a [place], elevated,  The attached cremasterics fibers are divided using electrocautery, the spermatic cord is mobilized within the inguinal ring and encircled with a Penrose drain x2  The testes was then delivered in the surgical field  The gubernaculum and cremasterics fibers were divided  Hemostasis was excellent throughout the case  Next a spermatic cord is mobilized proximally to the level of retroperitoneal fat  At this point, it is crossclamped, and doubly ligated using silk suture as well as a 2-0 Prolene to facilitate subsequent identification of the proximal spermatic cord should a subsequent RP L&D be indicated in the future  I injected local anesthesia into the spermatic cord stump and ensured that the spermatic cord stump was hemostatic prior to delivering it back into the retroperitoneum  At this point the transversus fascia overlying the inguinal canal is closed using a running 2-0 Vicryl approach  The wound is copiously irrigated  Hemostasis is assured  The subcutaneous fat is closed using 3-0 Vicryl and the skin is closed using 4-0 Monocryl in a running subcuticular fashion  A sterile dressing and scrotal support was applied  All needle and instrument counts were correct  The patient was placed back supine, awakened from general anesthesia and brought to recovery room in stable condition          ESTIMATED BLOOD LOSS:  Minimal      DRAINS:      SPECIMENS:   Order Name Source Comment Collection Info Order Time   TISSUE EXAM Testis, Left  Collected By: Kaylan Garber MD 12/14/2022  9:32 AM     Release to patient through [employer]   Immediate             COMPLICATIONS: none    DISPOSITION: PACU      PLAN:  Findings the time of surgery discussed with the patient's wife by phone  From a urologic standpoint he is stable for hospital discharge  I have canceled the referral to interventional radiology as there are no oncologic indications for a retroperitoneal biopsy for this disease state  Standard of care is to utilize the surgical pathology from the orchiectomy to guide further treatment  I have sent a message to our oncology nurse navigator to arrange for a follow-up visit with myself as well as medical oncology to discuss systemic therapy given his tameka disease

## 2022-12-14 NOTE — ASSESSMENT & PLAN NOTE
• Scrotal ultrasound: Left testicular nodules consistent with neoplasm with small hydrocele    • Possibly primary etiology for suspected metastatic disease  • See additional plan above, pending transfer to [facility]

## 2022-12-14 NOTE — TELEMEDICINE
e-Consult (IPC)  - Interventional Radiology  Kristopher Gordon 30 y o  male MRN: [id number]  Unit/Bed#: [facility] [place] Encounter: [id number]          Interventional Radiology has been consulted to evaluate Kristopher Gordon    We were consulted by [facility] concerning this patient with lymphadenopathy  IP Consult to IR  Consult performed by: Deanne Meckel, CRNP  Consult ordered by: Forbes Babinski, MD        12/14/22    Assessment/Recommendation:     30year old male with testicular mass, hyponatremia, retroperitoneal lymphadenopathy seen on CT  He has had night sweats, unexplained weight gain, early satiety, abdominal pain  Patient currently in the OR for left orchiectomy  Plan for left periaortic lymph node biopsy tomorrow      - NPO after MN   - plan for periaortic lymph node biopsy in CT     5-10 minutes, >50% of the total time devoted to medical consultative verbal/EMR discussion between providers  Written report will be generated in the EMR  Thank you for allowing Interventional Radiology to participate in the care of Kristopher Gordon  Please don't hesitate to call or TigerText us with any questions       [address] [place] [address]

## 2022-12-14 NOTE — RESPIRATORY THERAPY NOTE
RT Protocol Note  Kana Lo 30 y o  male MRN: [id number]  [facility] Encounter: [id number]    Assessment    Principal Problem:    Testicular mass  Active Problems:    Shortness of breath    Umbilical hernia without obstruction and without gangrene    Tobacco use    Unexplained weight gain    Retroperitoneal lymphadenopathy    Hypertension    Urinary hesitancy    Hyponatremia      Home Pulmonary Medications: none  Home Devices/Therapy: Other (Comment) (none)    Past Medical History:   Diagnosis Date    Depression     Psychiatric disorder     bipolar     Social History     Socioeconomic History    Marital status: Single     Spouse name: None    Number of children: None    Years of education: None    Highest education level: None   Occupational History    None   Tobacco Use    Smoking status: Former     Packs/day: 1 00     Types: Cigarettes     Quit date: 2022     Years since quittin 0    Smokeless tobacco: Never   Vaping Use    Vaping Use: Never used   Substance and Sexual Activity    Alcohol use: Not Currently     Comment: 2 cases of beer daily, stopped 3 years ago    Drug use: Yes     Types: Marijuana     Comment: daily    Sexual activity: Not Currently   Other Topics Concern    None   Social History Narrative    None     Social Determinants of Health     Financial Resource Strain: Not on file   Food Insecurity: Not on file   Transportation Needs: Not on file   Physical Activity: Not on file   Stress: Not on file   Social Connections: Not on file   Intimate Partner Violence: Not on file   Housing Stability: Not on file       Subjective         Objective    Physical Exam:   Assessment Type: Assess only  General Appearance: Sleeping  Respiratory Pattern: Dyspnea with exertion  Chest Assessment: Chest expansion symmetrical  Bilateral Breath Sounds: Clear  Cough: None    Vitals:  Blood pressure (!) 168/107, pulse 98, temperature 97 8 °F (36 6 °C), SpO2 95 %            Imaging and other studies: I have personally reviewed pertinent reports  Plan    Respiratory Plan: Mild Distress pathway        Resp Comments: Pt admitted with a non-respiratory issue but has recently been c/o SOB and dyspnea with exertion  No pulmonary history noted or home medications  Pt on Room Air, PRN Albuterol ordered, will maintain order at this time

## 2022-12-14 NOTE — PROGRESS NOTES
NEPHROLOGY PROGRESS NOTE   Kana Lo 30 y o  male MRN: [id number]  Unit/Bed#: [facility] 918-01 Encounter: [id number]  Reason for Consult: Acute hyponatremia    ASSESSMENT/PLAN:  Acute hyponatremia, hypoosmolar:  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 with retroperitoneal lymphadenopathy, possible presenting component of polydipsia  -admission sodium 129 11/10/2020 which decreased to 125 on 12/13 @ 1230  Started on torsemide 10 mg daily and salt tablets 2 g 3 times daily  - sodium 130 today at 0430  Appropriate rate of correction  -Baseline sodium appears to be 139-140 meq   -workup: serum osmolality 275, urine osmolality 729, urine sodium 73  -uric acid 3 4, cortisol 5 6 with increase to 17 1-->22 after cortrosyn, TSH normal  -No acute indication for Hypertonic saline (HTS) at this time  - Continue salt tablets 2 g 3 times daily and torsemide 10 mg daily  -Fluid restriction of 1 L/day  Received 800 ml LR total perioperatively  Off IVF  -Strict I/O  -Check BMP @ 1300 and in am  -Goal to raise sodium by 8 meq in the next  24 hours  Goal serum sodium 138 by  0500 12/15/2022  avoid overcorrection >8 meq   -Call if sodium is <130 or  > 136 in the next 24 hours  -Baseline creatinine 0 7-0 9  Most recent creatinine 0 85, at baseline  -continue to monitor closely  -will d/w Dr Patricio Urbano    Hypertension/Volume status:  -BP well controlled, at goal  -volume status hypervolemic  -Home medications: None  -Current medications: Amlodipine 10 mg daily, losartan 50 mg daily, torsemide 10 mg daily  -Optimize hemodynamic status to avoid delay in renal recovery  -recommend hold parameters on antihypertensive's for SBP <130 mmHg  -Avoid hypotension or fluctuations in blood pressure    Maintain MAP >65  -low sodium (2 gm) diet  -continue to trend    Left testicular mass with retroperitoneal lymphadenopathy:  -s/p left orchiectomy today, 12/14/2022  -Pathology pending  - Plan for periaortic lymph node biopsy by IR tomorrow, 12/15/2022  - Management per urology      Nephrology Disposition Recommendations  Not medically stable for discharge from a Nephrology Standpoint Patient continues to require treatment for Hyponatremia     SUBJECTIVE:  Patient awake, alert  Just arrived from PACU back to floor  s/p left orchiectomy this AM   Pain well controlled  Serum sodium improving at 130 this a m , appropriate rate of correction  Received  mL in OR  VSS    A complete review of systems was performed  Pertinent positives and negatives noted in the HPI  Otherwise the review of systems was negative  OBJECTIVE:  Current Weight: Weight - Scale: 136 kg (298 lb 15 1 oz)  Vitals:    12/14/22 1015 12/14/22 1023 12/14/22 1030 12/14/22 1033   BP: 117/79 117/79 140/79 140/79   Pulse: (!) 116 102 100 98   Resp: 14 18 16 18   Temp:    (!) 97 3 °F (36 3 °C)   SpO2: 95% 96% 96% 97%   Weight:           Intake/Output Summary (Last 24 hours) at 12/14/2022 1115  Last data filed at 12/14/2022 1007  Gross per 24 hour   Intake 1100 ml   Output --   Net 1100 ml     General:  Awake, alert, appears comfortable and in no acute distress  Nontoxic  Skin:  No rash, warm, good skin turgor   Eyes:  PERRL, EOMI, sclerae nonicteric   no periorbital edema   ENT:  Moist mucous membranes  Neck:  Trachea midline, symmetric  No JVD  Chest:  Clear to auscultation bilaterally without wheezes, crackles or rhonchi  CVS:  Regular rate and rhythm without murmur, gallop or rub  S1 and S2 identified and normal   No S3, S4    Abdomen:  Soft,, moderately distended without masses  Normal bowel sounds x 4 quadrants  No bruit  Incisional discomfort LLQ  Extremities:  Warm, pink, motor and sensory intact and well perfused  No cyanosis, pallor  No BLE edema  Neuro:  Awake, alert, oriented x3  Grossly intact  Psych:  Appropriate affect  Mentating appropriately    Normal mental status exam      Medications:    Current Facility-Administered Medications:   •  [facility] Hold] acetaminophen (TYLENOL) tablet 650 mg, 650 mg, Oral, Q6H PRN, Latesha Webb MD  •  [facility] Hold] albuterol inhalation solution 2 5 mg, 2 5 mg, Nebulization, Q6H PRN, Latesha Webb MD  •  [facility] Hold] amLODIPine (NORVASC) tablet 10 mg, 10 mg, Oral, Daily, Latesha Webb MD  •  [facility] Hold] heparin (porcine) subcutaneous injection 5,000 Units, 5,000 Units, Subcutaneous, Q8H [facility], Latesha Webb MD  •  [facility] Hold] labetalol (NORMODYNE) injection 10 mg, 10 mg, Intravenous, Q6H PRN, Latesha Webb MD  •  [facility] Hold] losartan (COZAAR) tablet 50 mg, 50 mg, Oral, Daily, Latesha Webb MD  •  [facility] Hold] morphine injection 2 mg, 2 mg, Intravenous, Q4H PRN, Latesha Webb MD  •  [facility] Hold] nicotine (NICODERM CQ) 14 mg/24hr TD 24 hr patch 1 patch, 1 patch, Transdermal, Daily, Latesha Webb MD  •  [facility] Hold] sodium chloride tablet 2 g, 2 g, Oral, TID With Meals, Latesha Webb MD  •  [facility] Hold] tamsulosin [facility]) capsule 0 4 mg, 0 4 mg, Oral, Daily With Jayme Nicholson MD  •  [facility] Hold] torsemide [facility]) tablet 10 mg, 10 mg, Oral, Daily, Latesha Webb MD    Laboratory Results:  Results from last 7 days   Lab Units 12/14/22  0432 12/13/22  2353 12/13/22  1226 12/13/22  0441 12/12/22  0434 12/11/22  0444 12/10/22  1753   WBC Thousand/uL 11 31*  --   --  10 03 9 71 9 50 10 46*   HEMOGLOBIN g/dL 14 6  --   --  13 9 14 2 14 0 14 6   HEMATOCRIT % 41 2  --   --  39 3 40 0 39 4 41 1   PLATELETS Thousands/uL 292  --   --  256 274 276 293   SODIUM mmol/L 130* 129* 125* 127* 129* 131* 129*   POTASSIUM mmol/L 3 5 3 5 3 6 3 9 3 9 3 5 3 6   CHLORIDE mmol/L 94* 95* 92* 94* 94* 97 92*   CO2 mmol/L 28 24 24 24 25 24 29   BUN mg/dL 14 16 12 14 10 12 13   CREATININE mg/dL 0 85 0 88 0 77 0 78 0 76 0 73 0 88   CALCIUM mg/dL 9 2 9 0 9 2 9 1 9 4 8 9 9 4   MAGNESIUM mg/dL 2 6  --   --   --   --   --  2 0   PHOSPHORUS mg/dL  --   -- --   --   --   --  3 3       I have personally reviewed the blood work as stated above and in my note  I have personally reviewed internal Medicine, co-consultants and previous nephrology notes
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
## 2022-12-16 ENCOUNTER — PATIENT OUTREACH (OUTPATIENT)
Dept: HEMATOLOGY ONCOLOGY | Facility: CLINIC | Age: 37
End: 2022-12-16

## 2022-12-16 NOTE — TELEPHONE ENCOUNTER
Post Op Note    Adrian Hair is a 30 y o  male s/p ORCHIECTOMY INGUINAL (Left: Groin) performed 12/14/2022  Adrian Hair is a patient of Dr Guevara Castle and is seen at the [facility] office  How would you rate your pain on a scale from 1 to 10, 10 being the worst pain ever? 2 patient reports feeling great today  Have you had a fever? No  Have your bowel movements been regular? No  Do you have any difficulty urinating? No  If the patient has an incision- do you have any redness around the incision or any drainage from the incision? No    Do you have any other questions or concerns that I can address at this time?   -patient overall feels great   No complaints overall  -went over weight lifting restrictions   -has appt with Dr Kevin Swift confirmed post op appt with dr Guevara Castle  -Discussed prescribed medications, no questions

## 2022-12-19 ENCOUNTER — PATIENT OUTREACH (OUTPATIENT)
Dept: HEMATOLOGY ONCOLOGY | Facility: CLINIC | Age: 37
End: 2022-12-19

## 2022-12-19 NOTE — PROGRESS NOTES
2nd outreach to pt and left voicemail introducing myself at Nurse Navigator, provided conact info and requested call back

## 2022-12-20 ENCOUNTER — PATIENT OUTREACH (OUTPATIENT)
Dept: HEMATOLOGY ONCOLOGY | Facility: CLINIC | Age: 37
End: 2022-12-20

## 2022-12-20 ENCOUNTER — DOCUMENTATION (OUTPATIENT)
Dept: HEMATOLOGY ONCOLOGY | Facility: CLINIC | Age: 37
End: 2022-12-20

## 2022-12-20 DIAGNOSIS — N50.89 TESTICULAR MASS: Primary | ICD-10-CM

## 2022-12-20 NOTE — PROGRESS NOTES
Call placed to pt to introduce myself as Nurse Navigator  Explained my role and ways to connect pt with available resources  Reviewed upcoming appts and pt agreeable to Rickey for consult  Explained that we anticipate him needing chemotherapy which can effect fertility  Pt interested in speaking to nurse regarding sperm banking and assured him I would arrange  Inquiring about refill for Sodium tablets that were started when pt was hospitalized  States he reached out to PCP and Urology but unable to get refill  Informed him I would also reach out  Provided support and encouraged to call with any questions or concerns  Addendum:  Noted pt following with Nephrology on 12/30/22  Task sent to Clinical Pool requesting refill on Sodium tablets

## 2022-12-20 NOTE — PROGRESS NOTES
Intake received, chart reviewed for need of external records  Pathology completed: 12/14/22  Imaging completed: 12/12/22  All records needed are in patients chart  No further action required of Care Coordination team at this time  I will make sure bx is resulted for appt

## 2022-12-21 ENCOUNTER — PATIENT OUTREACH (OUTPATIENT)
Dept: HEMATOLOGY ONCOLOGY | Facility: CLINIC | Age: 37
End: 2022-12-21

## 2022-12-21 ENCOUNTER — DOCUMENTATION (OUTPATIENT)
Dept: HEMATOLOGY ONCOLOGY | Facility: CLINIC | Age: 37
End: 2022-12-21

## 2022-12-21 DIAGNOSIS — N50.89 TESTICULAR MASS: ICD-10-CM

## 2022-12-21 RX ORDER — SODIUM CHLORIDE 1000 MG
2 TABLET, SOLUBLE MISCELLANEOUS
Qty: 30 TABLET | Refills: 0 | Status: SHIPPED | OUTPATIENT
Start: 2022-12-21

## 2022-12-21 NOTE — PROGRESS NOTES
MSW received a referral for this pt from Navigation  Pt will be seen by Oncology on 1/3  MSW will plan to make outreach after that date to introduce self and complete assessments

## 2022-12-21 NOTE — PROGRESS NOTES
Pt prescribed sodium tablets while hospitalized and requesting refill  Follow-up with Nephrology 12/30/2022  Please assist   Thank you!

## 2022-12-22 NOTE — UTILIZATION REVIEW
URGENT/EMERGENT  INPATIENT/SPU AUTHORIZATION REQUEST    Date: 12/22/22            # Pages in this Request:     X New Request   Additional Information for PA#:     Office Contact Name:  Serenity Rodríguez Title: Utilization Review, [facility] Nurse     Phone: [phone]  Ext  Availability (Date/Time): Wednesday - Friday 8 am- 4 pm    x Inpatient Review  SPU Review       x Current        Late Pick-up   · How your facility was first notified of the Late Pick-up:  · When your facility was first notified of the Late Pick-up (date):         RECIPIENT INFORMATION    Recipient ID#: [id number]   Recipient Name: Nivia Cancer      YOB: 1985  30 y o  Recipient Alias:     Gender:  X Male  Female Medicaid Eligibility ([address] [place] [address]): INSURANCE INFORMATION    (All other private or governmental health insurance benefits must be utilized prior to billing the [place] Program)    Was this admission the result of an MVA, other accident, assault, injury, fall, gunshot, bite etc ? Yes x No                   If yes, provide a brief description of the incident  Does the recipient have other insurance coverage? Yes x No        Insurance Company Name/Policy #      Did that insurance pay on this claim? Yes  No        Did that insurance deny this claim? Yes  No    If yes, reason for denial:      Does the recipient have Medicare? Yes x No        Did Medicare exhaust prior to this admission? Yes  No        Did Medicare partially pay this claim? Yes  No        Did that insurance deny this claim? Yes  No    If yes, reason for denial:          Was the recipient a prisoner at the time of admission? Yes x No            PROVIDER INFORMATION    Hospital Name:  [facility] Provider ID#: [id number]    Admitting Physician Name:SUMMER Boo Provider ID#: [id number]        ADMISSION INFORMATION    Type of Admission: (please choose one)     ED      Direct    If yes, from where?     X Transfer    If yes, transferring hospital (inpatient, rehab, or psych) Provider Name/Provider ID#: Jenna Kelley - [phone]    Admission Floor or Unit Type: Med Surg     Dates/Times:        ED Date/Time:         Observation Date/Time:         Admission Date/Time: 12/13/22  9:18 PM        Discharge or Transfer Date/Time: 12/14/2022  2:35 PM        DIAGNOSIS/PROCEDURE CODES    Primary Diagnosis Code/Primary Diagnosis Code description:  N43 3 Hydrocele, unspecified    E22 2 Syndrome of inappropriate secretion of antidiuretic hormone   F31 30 Bipolar disorder, current episode depressed, mild or moderate severity, unspecified   E87 70 Fluid overload, unspecified     Additional Diagnosis Code(s) and Description(s)-(up to three additional codes):    Procedure Code (one) and description:  [id number] Resection of Left Testis, Open Approach      CLINICAL INFORMATION - PRIOR ADMISSION ONLY    Is there a prior admission with a discharge date within 30 days of the date of this admission? X No (Proceed to the next section - "Clinical Information - General Review Checklist:)      Yes (Provide the following information)     Prior admission dates:    MA Prior Authorization Number:        Review Outcome:     Diagnosis Code(s)/Description:    Procedure Code/Description:    Findings:    Treatment:    Condition on Discharge:   Vitals:    Labs:   Imaging:   Medications: Follow-up Instructions:    Disposition:        CLINICAL INFORMATION - GENERAL REVIEW CHECKLIST    EMERGENCY DEPARTMENT: (Proceed to "ADMISSION" if Direct Admission)    Presenting Signs/Symptoms:    Medication/treatment prior to arrival in the ED:    Past Medical History:    Clinical Exam:    Initial Vital Signs: (Temp, Pulse, Resp, and BP)     Pertinent Repeat Vital Signs: (include times they were obtained)    Pertinent Sustained Findings: (include times they were obtained)    Weight in Kilograms:    Pertinent Labs (results):    Radiology (results):    EKG (results):      Other tests (results):    Tests pending final results:    Treatment in the ED:      Other treatments:      Change in condition while in the ED:     Response to ED Treatment:          OBSERVATION: (Proceed to "ADMISSION" if Direct Admission)    Orders written during the observation period  Meds Name, dose, route, time, how may doses given:  PRN Meds Name, dose, route, time, how many doses given within the first 24 hrs :  IVs Type, rate, and total amt  ordered/given:  Labs, imaging, other:  Consults and findings:    Test Results during the observation period  Pertinent Lab tests (dates/results):  Culture results (blood, urine, spinal, wound, respiratory, etc ):  Imaging tests (dates/results):  EKG (dates/results): Other test (dates/results):  Tests pending (dates/results):    Surgical or Invasive Procedures during the observation period  Name of surgery/procedure:  Date & Time:  Patient Response:  Post-operative orders:  Operative Report/Findings:    Response to Treatment, Major Change in Condition, Major Charge in Treatment during the observation period          ADMISSION:    DIRECT Admissions Only:  30 y o  male with PMHx of testicular mass presents to [facility] as a transfer from [address] [place] [address] ED where he initially presented with sod and dyspnea on exertion, night sweats, unexplained weight gain, early satiety, abdominal painnd and weight gain  W/u in ED showed retroperitoneal lymphadenopathy c/f malignancy, and pt transferred to [facility] for left orchiectomy with urology  Pt also hyponatremic on presentation now further decreased to 127  BP elevated  Admit inpatient to M/S/Tele unit -- Nephrology consulted for hyponatremia -- w/u at [facility] 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  IVF's, sodium tabs, torsemide added  Cortrosyn stim test ordered  Goal  tomorrow morning  BMP in AM  continue Flomax  Urinary retention protocol  Continue losartan, amlodipine, prn IV labetalol for BP   Urology consulted with possible OR intervention  Npo currently  Nicotine patch  Sob etiology unknown, previous w/u at [facility] neg so far  Albuterol prn  Supportive care         · Presenting Signs/Symptoms:   ·   · Medication/treatment prior to arrival:  ·   · Past Medical History:  ·   · Clinical Exam on admission:  ·   · Vital Signs on admission: (Temp, Pulse, Resp, and BP)  ·   · Weight in kilograms:     ALL Admissions:    Admission Orders and Other Orders written within the first 24 hrs after admission  Meds Name, dose, route, time, how may doses given:  PRN Meds Name, dose, route, time, how many doses given within the first 24 hrs :  IVs Type, rate, and total amt  ordered/given:  Labs, imaging, other:      Consults and findings:  Urology consult 12/14 -- Multifocal left testicular masses -- US and sickle exam is concerning for neoplasm  Tumor markers negative  Retroperitoneal lymphadenopathy -- Periaortic lymph nodes present in the abdomen, and down to the level of the left external iliac  -Proceed to the operating for left radical inguinal orchiectomy today     Nephrology Consult -12/14 -   Acute hyponatremia, hypoosmolar:  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 with retroperitoneal lymphadenopathy, possible presenting component of polydipsia   this AM, appropriate of correction   - Continue salt tablets 2 g 3 times daily and torsemide 10 mg daily  Fluid restriction of 1 L/day   Received 800 ml LR total perioperatively   Off IVF  Strict I/O  Check BMP @ 1300 and in am  Goal to raise sodium by 8 meq in the next  24 hours   Goal serum sodium 138 by  0500 12/15/2022  avoid overcorrection >8 meq  Creatinine currently at baseline, continue to monitor closely  BP well controlled    Continue current po meds:  Amlodipine 10 mg daily, losartan 50 mg daily, torsemide 10 mg daily       Test Results after admission  Pertinent Lab tests (dates/results):  Culture results (blood, urine, spinal, wound, respiratory, etc ):  Imaging tests (dates/results):  EKG (dates/results): Other test (dates/results):  Tests pending (dates/results):    Surgical or Invasive Procedures  Name of surgery/procedure:  Left Radical inguinal orchiectomy  Date & Time: 12/14/2022   Patient Response: tolerated   Post-operative orders: Same   Operative Report/Findings: A 4 cm oblique incision is introduced two thirds of the way between the anterior superior iliac spine and the to pubic tubercle  This is carried down to the level of the transversus fascia  The spermatic cord is visualized within the inguinal canal and the fascia is incised sharply to the level of the external inguinal ring  The spermatic cord was then encircled with a [place], elevated,  The attached cremasterics fibers are divided using electrocautery, the spermatic cord is mobilized within the inguinal ring and encircled with a Penrose drain x2  The testes was then delivered in the surgical field  The gubernaculum and cremasterics fibers were divided  Hemostasis was excellent throughout the case      Next a spermatic cord is mobilized proximally to the level of retroperitoneal fat  At this point, it is crossclamped, and doubly ligated using silk suture as well as a 2-0 Prolene to facilitate subsequent identification of the proximal spermatic cord should a subsequent RP L&D be indicated in the future  I injected local anesthesia into the spermatic cord stump and ensured that the spermatic cord stump was hemostatic prior to delivering it back into the retroperitoneum      At this point the transversus fascia overlying the inguinal canal is closed using a running 2-0 Vicryl approach  The wound is copiously irrigated  Hemostasis is assured  The subcutaneous fat is closed using 3-0 Vicryl and the skin is closed using 4-0 Monocryl in a running subcuticular fashion  A sterile dressing and scrotal support was applied      Response to Treatment, Major Change in Condition, Major Charge in Treatment anytime during admission      HPI: This is a very pleasant 37-year-old male with past medical history of testicular mass who presents to [address] [place] [address] on December 10 with symptoms of shortness of breath and dyspnea on exertion with associated weight gain  Work-up illustrated evidence of retroperitoneal lymphadenopathy consistent with malignancy  Patient was transferred at [facility] for left-sided orchiectomy  Summary of Hospital Course: He tolerated procedure well  No further diagnostic intervention regarding the testicular mass at this point in time as per urology  Urology to follow-up with pathology and guide therapy based on results  Disposition on Discharge  Home, Rehab, SNF, LTC, Shelter, etc : Home/Self Care    Cease to Breathe (CTB)  If a patient expires during an admission, in addition to the above information, please include:    Summary/timeline of the patient's decline in condition:    Medications and treatment:    Patient response to treatment:    Date and time patient ceased to breathe:        Is there a Readmission that follows this admission? Yes x No    If yes, provide dates:          InterQual Review    InterQual Criteria Met:  Yes X No  N/A        Please include the InterQual Review, InterQual year/version used, and the criteria selected:     Created Using Review Status Review Entered   InterQual Connect™ In Primary 12/14/2022 2805       Created By   Analia Douglas RN       Criteria Set Name - Subset   LOC:Acute Adult-General Surgical      Criteria Review   REVIEW SUMMARY     InterQual® Review Status: In Primary  Review Type: Admission  Criteria Status: Not Met  Condition Specific: Yes        REVIEW DETAILS     Product: Jonathan Lott Adult  Subset: General Surgical           Version: Media[employer]® 2022, Oct  2022 Release  InterQual® criteria (IQ) is confidential and proprietary information and is being provided to you solely as it pertains to the information requested   IQ may contain advanced clinical knowledge which we recommend you discuss with your physician upon disclosure to you  Use permitted by and subject to license with [employer] and/or one of its [place]  IQ reflects clinical interpretations and analyses and cannot alone either (a) resolve medical ambiguities of particular situations; or (b) provide the sole basis for definitive decisions  IQ is intended solely for use as screening guidelines with respect to medical appropriateness of healthcare services  All ultimate care decisions are strictly and solely the obligation and responsibility of your health care provider  © 2022 [employer] and/or one of its subsidiaries  All Rights Reserved  CPT® only © 5937-2215 [employer]  All Rights Reserved  PLEASE SUBMIT THE COMPLETED FORM TO THE DEPARTMENT OF HUMAN SERVICES - DIVISION OF CLINICAL  REVIEW VIA FAX -[phone] or VIA E-MAIL TO [email or web address]    Signature: Romie Cope Date:  12/22/22    Confidentiality Notice: The documents accompanying this telecopy may contain confidential information belonging to the sender  The information is intended only for the use of the individual named above  If you are not the intended recipient, you are hereby notified  That any disclosure, copying, distribution or taking of any telecopy is strictly prohibited

## 2022-12-26 ENCOUNTER — TELEPHONE (OUTPATIENT)
Dept: SURGICAL ONCOLOGY | Facility: CLINIC | Age: 37
End: 2022-12-26

## 2022-12-26 NOTE — TELEPHONE ENCOUNTER
12/26/22  Intake received  Follow with patient outreach  [place]  [address] [facility]  Advise to contact county to change to in network Medicaid  [email or web address]    12/28/22  Placed call to the patient to inform him about his medicaid and he stated that he has [address]  I would check Promise again today and see that it still reads Active but for [facility]  He sounded like he was in the car with family but told me he had the card to read off to me  I asked that he bring it to his next appointment to be scanned in  He thanked me for the call

## 2022-12-27 ENCOUNTER — DOCUMENTATION (OUTPATIENT)
Dept: HEMATOLOGY ONCOLOGY | Facility: CLINIC | Age: 37
End: 2022-12-27

## 2022-12-27 ENCOUNTER — PATIENT OUTREACH (OUTPATIENT)
Dept: HEMATOLOGY ONCOLOGY | Facility: CLINIC | Age: 37
End: 2022-12-27

## 2022-12-27 NOTE — PROGRESS NOTES
Left voicemail for Yessenia Montemayor at [facility] requesting call to pt to provide information on sperm banking  Advised pt will have Oncologist consult on 1/3/2023 where he will discuss if chemotherapy is needed  Pt expressed interest and would like info  Pt phone number and my phone number provided

## 2022-12-27 NOTE — PROGRESS NOTES
Outreach to pt making him aware that message was left at [facility] requesting call to pt to discuss sperm banking procedure and options  Informed pt that if it is decided he will need chemotherapy, we can move forward but just want him to have information ahead of time  He verbalized understanidng

## 2022-12-28 ENCOUNTER — TELEPHONE (OUTPATIENT)
Dept: SURGICAL ONCOLOGY | Facility: CLINIC | Age: 37
End: 2022-12-28

## 2022-12-29 ENCOUNTER — PATIENT OUTREACH (OUTPATIENT)
Dept: HEMATOLOGY ONCOLOGY | Facility: CLINIC | Age: 37
End: 2022-12-29

## 2022-12-29 NOTE — PROGRESS NOTES
Called patient and I introduced myself  I explained to patient that Darrius Mckeon and I work as a team  I asked patient if he would like me to meet him at his  MED ONC visit on 1/3/23 to go over sperm banking information and set up for him if need be  He stated that he would like me to be there  I will meet with patient in person on that date  He voiced understanding

## 2023-01-02 DIAGNOSIS — N50.89 TESTICULAR MASS: ICD-10-CM

## 2023-01-03 ENCOUNTER — PATIENT OUTREACH (OUTPATIENT)
Dept: HEMATOLOGY ONCOLOGY | Facility: CLINIC | Age: 38
End: 2023-01-03

## 2023-01-03 ENCOUNTER — CONSULT (OUTPATIENT)
Dept: HEMATOLOGY ONCOLOGY | Facility: CLINIC | Age: 38
End: 2023-01-03

## 2023-01-03 ENCOUNTER — OFFICE VISIT (OUTPATIENT)
Dept: FAMILY MEDICINE CLINIC | Facility: CLINIC | Age: 38
End: 2023-01-03

## 2023-01-03 VITALS
TEMPERATURE: 97 F | WEIGHT: 306 LBS | HEART RATE: 121 BPM | HEIGHT: 76 IN | DIASTOLIC BLOOD PRESSURE: 100 MMHG | RESPIRATION RATE: 17 BRPM | BODY MASS INDEX: 37.26 KG/M2 | SYSTOLIC BLOOD PRESSURE: 140 MMHG | OXYGEN SATURATION: 98 %

## 2023-01-03 VITALS
DIASTOLIC BLOOD PRESSURE: 90 MMHG | HEIGHT: 76 IN | BODY MASS INDEX: 37.02 KG/M2 | RESPIRATION RATE: 20 BRPM | WEIGHT: 304 LBS | HEART RATE: 124 BPM | TEMPERATURE: 98.3 F | SYSTOLIC BLOOD PRESSURE: 140 MMHG | OXYGEN SATURATION: 97 %

## 2023-01-03 DIAGNOSIS — R59.0 RETROPERITONEAL LYMPHADENOPATHY: ICD-10-CM

## 2023-01-03 DIAGNOSIS — F31.9 BIPOLAR DISORDER WITH DEPRESSION (HCC): Primary | ICD-10-CM

## 2023-01-03 DIAGNOSIS — N50.89 TESTICULAR MASS: ICD-10-CM

## 2023-01-03 DIAGNOSIS — K21.9 CHRONIC GERD: Primary | ICD-10-CM

## 2023-01-03 DIAGNOSIS — C62.92 SEMINOMA OF LEFT TESTIS (HCC): ICD-10-CM

## 2023-01-03 RX ORDER — LOSARTAN POTASSIUM 50 MG/1
50 TABLET ORAL DAILY
Qty: 30 TABLET | Refills: 0 | Status: SHIPPED | OUTPATIENT
Start: 2023-01-03

## 2023-01-03 RX ORDER — QUETIAPINE FUMARATE 25 MG/1
25 TABLET, FILM COATED ORAL
Qty: 30 TABLET | Refills: 0 | Status: SHIPPED | OUTPATIENT
Start: 2023-01-03

## 2023-01-03 RX ORDER — AMLODIPINE BESYLATE 10 MG/1
10 TABLET ORAL DAILY
Qty: 30 TABLET | Refills: 0 | Status: SHIPPED | OUTPATIENT
Start: 2023-01-03

## 2023-01-03 RX ORDER — TORSEMIDE 10 MG/1
10 TABLET ORAL DAILY
Qty: 30 TABLET | Refills: 0 | Status: SHIPPED | OUTPATIENT
Start: 2023-01-03

## 2023-01-03 RX ORDER — FLUOXETINE 10 MG/1
10 CAPSULE ORAL DAILY
Qty: 30 CAPSULE | Refills: 2 | Status: SHIPPED | OUTPATIENT
Start: 2023-01-03

## 2023-01-03 RX ORDER — SODIUM CHLORIDE 1000 MG
2 TABLET, SOLUBLE MISCELLANEOUS
Qty: 30 TABLET | Refills: 0 | Status: SHIPPED | OUTPATIENT
Start: 2023-01-03 | End: 2023-01-03 | Stop reason: SDUPTHER

## 2023-01-03 RX ORDER — SODIUM CHLORIDE 1000 MG
2 TABLET, SOLUBLE MISCELLANEOUS
Qty: 30 TABLET | Refills: 0 | Status: SHIPPED | OUTPATIENT
Start: 2023-01-03

## 2023-01-03 NOTE — PROGRESS NOTES
Oncology Consult Note  Gabby Colon 30 y o  male MRN: [id number]  Unit/Bed#:  Encounter: [id number]      Presenting Complaint: Left side seminoma    History of Presenting Illness: Gabby Colon is seen for initial consultation 1/3/2023 at the referral of Aylin Noonan MD     21-year-old  male with history of hypertension, morbid obesity, periumbilical hernia, fatty liver, fluid retention, who felt a mass in the left testicle, on 6/18/2021 he was seen in the emergency room, ultrasound showed heterogeneous mass suspicious for malignancy referred for urology however he did not show up    According to the patient this mass has been growing up slowly, requesting evaluation by urology on 12/2022, CT scan of the abdomen and pelvis showed abnormal appearing retroperitoneal lymph nodes for example aortocaval lymph node measuring 2 2 x 2 2 cm, retrocaval lymph node measuring 1 6 x 1 5 cm, enlarged left iliac lymph node measuring 2 x 2 centimeter, fat stranding on the right anterior common/external iliac artery    Ultrasound of the scrotum showed left testicular nodules consistent with neoplasm and a small hydrocele the largest measuring 2 3 x 1 8 x 2 cm in the upper pole    Alpha-fetoprotein, hCG, LDH were normal before the surgery    Status post radical left orchiectomy on 12/14/2022    Final Diagnosis   A  Left testicle and spermatic cord, radical orchiectomy:  -Seminoma, 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   -Largest viable tumor focus measures 0 9 cm in greatest dimension    -All margins negative for carcinoma   -Definitive lymph-vascular invasion not identified, see note  -Extensive necrosis and fibrosis present     -SEE SYNOPTIC REPORT     Comment: Tumor cells are positive for OCT3/4 and  and negative for CD45, CK-AE1/AE3, CD30, glypican-3, AFP, Beta-HCG, and inhibin   Electronically signed by Yuniel Allen DO on 12/21/2022 at  3:04 PM   Note    Small focus of tumor is present within lymphvascular space without surrounding fibrin or clot  This is favored to represent carryover  CLINICAL   Pre-Orchiectomy Serum Tumor Markers  Serum marker studies within normal limits    Post-Orchiectomy Serum Tumor Markers  Unknown    Serum Tumor Markers (S)  S0 (serum marker study levels within normal limits)    SPECIMEN   Specimen Laterality  Left    TUMOR   Tumor Focality  Unifocal    Tumor Size  Greatest dimension of main tumor mass (Centimeters): 4 5 cm   Histologic Type  Seminoma    Tumor Extent  Limited to testis    Lymphovascular Invasion  Not identified    MARGINS   Margin Status  All margins negative for tumor    REGIONAL LYMPH NODES   Regional Lymph Node Status  Not applicable (no regional lymph nodes submitted or found)      Stage I (pT1b, PN X, MX, S 0)    He smokes 1 pack of cigarette daily for many years, he has 3 children, his father  with lung cancer    He reported severe acid reflux with tenderness in the epigastrium area denied any headache blurred vision chest pain he reported dyspnea on exertion abdominal pain denied any dysuria hematuria melena hematochezia or change in bowel habits    He does not drink alcohol  Review of Systems - As stated in the HPI otherwise the fourteen point review of systems was negative      Past Medical History:   Diagnosis Date   • Anxiety    • Depression    • Psychiatric disorder     bipolar       Social History     Socioeconomic History   • Marital status: Single     Spouse name: Not on file   • Number of children: Not on file   • Years of education: Not on file   • Highest education level: Not on file   Occupational History   • Not on file   Tobacco Use   • Smoking status: Every Day     Packs/day: 1 00     Types: Cigarettes     Start date: 2008     Last attempt to quit: 2022     Years since quittin 0   • Smokeless tobacco: Never   Vaping Use   • Vaping Use: Never used   Substance and Sexual Activity   • Alcohol use: Not Currently Comment: 2 cases of beer daily, stopped 3 years ago   • Drug use: Yes     Types: Marijuana     Comment: daily   • Sexual activity: Not Currently   Other Topics Concern   • Not on file   Social History Narrative   • Not on file     Social Determinants of Health     Financial Resource Strain: Not on file   Food Insecurity: Not on file   Transportation Needs: Not on file   Physical Activity: Not on file   Stress: Not on file   Social Connections: Not on file   Intimate Partner Violence: Not on file   Housing Stability: Not on file       No family history on file      No Known Allergies      Current Outpatient Medications:   •  albuterol (ProAir HFA) 90 mcg/act inhaler, Inhale 2 puffs every 6 (six) hours as needed for wheezing, Disp: 8 5 g, Rfl: 0  •  amLODIPine (NORVASC) 10 mg tablet, Take 1 tablet (10 mg total) by mouth daily, Disp: 30 tablet, Rfl: 0  •  docusate sodium (COLACE) 100 mg capsule, Take 1 capsule (100 mg total) by mouth 2 (two) times a day for 15 days, Disp: 30 capsule, Rfl: 0  •  FLUoxetine (PROzac) 10 mg capsule, Take 1 capsule (10 mg total) by mouth daily, Disp: 30 capsule, Rfl: 2  •  losartan (COZAAR) 50 mg tablet, Take 1 tablet (50 mg total) by mouth daily, Disp: 30 tablet, Rfl: 0  •  QUEtiapine (SEROquel) 25 mg tablet, Take 1 tablet (25 mg total) by mouth daily at bedtime, Disp: 30 tablet, Rfl: 0  •  sodium chloride 1 g tablet, Take 2 tablets (2 g total) by mouth 3 (three) times a day with meals, Disp: 30 tablet, Rfl: 0  •  tamsulosin (FLOMAX) 0 4 mg, Take 1 capsule (0 4 mg total) by mouth daily with dinner, Disp: 30 capsule, Rfl: 0  •  torsemide (DEMADEX) 10 mg tablet, Take 1 tablet (10 mg total) by mouth daily, Disp: 30 tablet, Rfl: 0      /100 (BP Location: Left arm, Patient Position: Sitting, Cuff Size: Adult)   Pulse (!) 121   Temp (!) 97 °F (36 1 °C)   Resp 17   Ht 6' 4" (1 93 m)   Wt (!) 139 kg (306 lb)   SpO2 98%   BMI 37 25 kg/m²       General Appearance:    Alert, oriented, obese       Eyes:    PERRL   Ears:    Normal external ear canals, both ears   Nose:   Nares normal, septum midline   Throat:   Mucosa moist  Pharynx without injection  Neck:   Supple       Lungs:     Clear to auscultation bilaterally   Chest Wall:    No tenderness or deformity    Heart:    Regular rate and rhythm       Abdomen:     Soft, tender in the epigastrium and left upper quadrant, bowel sounds +, no organomegaly           Extremities:   Extremities no cyanosis or edema       Skin:   no rash or icterus  Lymph nodes:   Cervical, supraclavicular, and axillary nodes normal   Neurologic:   CNII-XII intact, normal strength, sensation and reflexes     Throughout               No results found for this or any previous visit (from the past 48 hour(s))  XR chest 1 view portable    Result Date: 12/11/2022  Narrative: CHEST INDICATION:   SOB, fluid retention  COMPARISON:  CXR 4/29/2013, abdomen CT 12/10/2022  EXAM PERFORMED/VIEWS:  XR CHEST PORTABLE FINDINGS: Cardiomediastinal silhouette appears unremarkable  The lungs are clear  No pneumothorax or pleural effusion  Old right clavicle fracture  Impression: No acute cardiopulmonary disease  Workstation performed: [id number]     US scrotum and testicles    Result Date: 12/12/2022  Narrative: SCROTAL ULTRASOUND INDICATION:    Suspected testicular CA  COMPARISON: CT scan 12/10/2022 and ultrasound scrotum 6/18/2021 TECHNIQUE:   Ultrasound the scrotal contents was performed with a high frequency linear transducer utilizing volumetric sweep imaging as well as standard still image techniques  Imaging performed in longitudinal and transverse orientation  Color and spectral Doppler evaluation also performed bilaterally  FINDINGS: TESTES: RIGHT testis = 4 4 x 2 0 x 2 6 cm  Volume 11 8 mL Normal contour with homogeneous smooth echotexture  No intratesticular mass lesion or calcifications  LEFT testis = 6 8 x 3 8 x 2 7 cm   Volume 36 7 mL Diffusely abnormal heterogeneity as seen on prior study with hypovascularity compared to the right  Several individual hypoechoic nodules can be measured the largest measuring 2 3 x 1 8 x 2 0 cm in the upper pole EPIDIDYMIDES: Normal Size  Doppler ultrasound demonstrates normal blood flow  No epididymal lesions  HYDROCELE:  Left hydrocele noted  VARICOCELE:  None present  SCROTUM:  Scrotal thickness and appearance within normal limits  No evidence for extratesticular mass or hernia demonstrated  Impression:  Left testicular nodules consistent with neoplasm with small hydrocele  Workstation performed: [id number]     CTA chest pe study    Result Date: 12/12/2022  Narrative: CTA - CHEST WITH IV CONTRAST - PULMONARY ANGIOGRAM INDICATION:   Pulmonary embolism (PE) suspected, neg D-dimer SOB, Tachycardia, R/o PE, and Evalaute for Metastasis  Retroperitoneal lymphadenopathy on abdomen CT from 12/10/2022  COMPARISON: CXR and abdomen CT 12/10/2022, chest CT 4/29/2013  TECHNIQUE: CT angiogram timed for optimal opacification of the pulmonary arteries  Axial, sagittal, and coronal 2D reformats created from source data  Coronal 3D MIP postprocessing on the acquisition scanner  Radiation dose length product (DLP):  471 mGy-cm   Radiation dose exposure minimized using iterative reconstruction and automated exposure control  IV Contrast:  100 mL of iohexol (OMNIPAQUE)  FINDINGS: PULMONARY ARTERIES:  No pulmonary embolus  LUNGS:  Lungs clear  AIRWAYS: No significant filling defects  PLEURA:  Unremarkable  HEART/GREAT VESSELS:  Normal for age  MEDIASTINUM AND NIMA:  Unremarkable  CHEST WALL AND LOWER NECK: Unremarkable  UPPER ABDOMEN:  Unremarkable  OSSEOUS STRUCTURES:  Old right clavicle fracture  Impression: No pulmonary embolus  No acute pulmonary disease  Nothing to indicate metastatic disease    Workstation performed: [id number]     VAS lower limb venous duplex study, complete bilateral    Result Date: 12/12/2022  Narrative:  [facility] REPORT CLINICAL: Indications: Patient c/o new swelling and discomfort in both legs  No history of DVT/PE recent surgery or trauma  CONCLUSION:  Impression: RIGHT LOWER LIMB: No evidence of acute or chronic deep vein thrombosis  No evidence of superficial thrombophlebitis noted  Doppler evaluation shows a normal response to augmentation maneuvers  Popliteal, posterior tibial and peroneal arterial Doppler waveforms are triphasic  LEFT LOWER LIMB: No evidence of acute or chronic deep vein thrombosis  No evidence of superficial thrombophlebitis noted  Doppler evaluation shows a normal response to augmentation maneuvers  Popliteal, posterior tibial and peroneal arterial Doppler waveforms are triphasic  SIGNATURE: Electronically Signed by: Kathy Lam on 2022-12-12 11:25:51 AM    CT abdomen pelvis with contrast    Result Date: 12/10/2022  Narrative: CT ABDOMEN AND PELVIS WITH IV CONTRAST INDICATION:   LUQ abdominal pain fluid retention, L sided abd pain and LUQ mass  COMPARISON:  CT scan dated September 13, 2020  Testicular ultrasound exam dated June 18, 2021 TECHNIQUE:  CT examination of the abdomen and pelvis was performed  Axial, sagittal, and coronal 2D reformatted images were created from the source data and submitted for interpretation  Radiation dose length product (DLP) for this visit:  1449 8 mGy-cm   This examination, like all CT scans performed in the [facility], was performed utilizing techniques to minimize radiation dose exposure, including the use of iterative  reconstruction and automated exposure control  IV Contrast:  100 mL of iohexol (OMNIPAQUE) Enteric Contrast:  Enteric contrast was not administered  FINDINGS: ABDOMEN LOWER CHEST:  No clinically significant abnormality identified in the visualized lower chest  LIVER/BILIARY TREE:  Diffuse steatosis  GALLBLADDER:  No calcified gallstones  No pericholecystic inflammatory change   SPLEEN: Unremarkable  PANCREAS:  Unremarkable  ADRENAL GLANDS:  Unremarkable  KIDNEYS/URETERS:  Few low-attenuation lesions which are too small to characterize but statistically benign  STOMACH AND BOWEL:  Unremarkable  APPENDIX:  No findings to suggest appendicitis  ABDOMINOPELVIC CAVITY:  Abnormal-appearing retroperitoneal lymph nodes, with an index aortocaval lymph node measuring 2 2 x 2 2 cm  In index retrocaval lymph node measures 1 6 x 1 5 cm  There is an enlarged left external iliac lymph node which measures 2 x 2 cm (201, 63)  Nonspecific fat stranding on the right anterior to the common and external iliac artery which is nonspecific  VESSELS:  Unremarkable for patient's age  PELVIS REPRODUCTIVE ORGANS:  Unremarkable for patient's age  URINARY BLADDER:  Unremarkable  ABDOMINAL WALL/INGUINAL REGIONS:  Fat-containing umbilical hernia  OSSEOUS STRUCTURES:  No destructive skeletal lesions  Impression: Retroperitoneal lymphadenopathy which is concerning for a malignant process, specifically metastatic testicular cancer given the findings on prior scrotal ultrasound  Finding discussed with Dr Brittany Cedeño at the time of this dictation  Hepatic steatosis  Nonspecific fat stranding along the right common and external iliac vessels, of uncertain etiology  Workstation performed: [id number]     Echo complete w/ contrast if indicated    Result Date: 12/12/2022  Narrative: •  Left Ventricle: Left ventricular cavity size is normal  Wall thickness is mildly increased  There is concentric remodeling  The left ventricular ejection fraction is 60%   Systolic function is normal  Wall motion is normal  Diastolic function is normal  •  Right Ventricle: Right ventricular cavity size is normal  Systolic function is normal      ECOG :0      Assessment and plan:  47-year-old  male who presented with gradual enlargement of the left testicle over a year and a half, recently had a CT scan of the abdomen and pelvis showed aortocaval lymphadenopathy measuring 2 2 cm, retrocaval lymphadenopathy measuring 1 6 cm and left iliac lymphadenopathy, status post radical orchiectomy on 12/14/2022 for stage I (T1b, NX, MX, 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

## 2023-01-03 NOTE — PATIENT INSTRUCTIONS
Depression   AMBULATORY CARE:   Depression  is a medical condition that causes feelings of sadness or hopelessness that do not go away  Depression may cause you to lose interest in things you used to enjoy  These feelings may interfere with your daily life  Common symptoms include the following:   · Appetite changes, or weight gain or loss    · Trouble going to sleep or staying asleep, or sleeping too much    · Fatigue or lack of energy    · Feeling restless, irritable, or withdrawn    · Feeling worthless, hopeless, discouraged, or guilty    · Trouble concentrating, remembering things, doing daily tasks, or making decisions    · Thoughts about hurting or killing yourself    Call your local emergency number (911 in the [address] [facility]) if:   · You think about harming yourself or someone else  · You have done something on purpose to hurt yourself  Call your therapist or doctor if:   · Your symptoms do not improve  · You cannot make it to your next appointment  · You have new symptoms  · You have questions or concerns about your condition or care  The following resources are available at any time to help you, if needed:   · [address] [place] [address]: [phone] (8-208-018-GKJR)     · Suicide Hotline: 2-116.764.2380 (7-401-CWAMKGR)     · For a list of international numbers: https://save org/find-help/international-resources/    Treatment for depression  may include medicine to relieve depression  Medicine is often used together with therapy  Therapy is a way for you to talk about your feelings and anything that may be causing depression  Therapy can be done alone or in a group  It may also be done with family members or a significant other  Self-care:   · Get regular physical activity  Try to be active for 30 minutes, 3 to 5 days a week  Physical activity can help relieve depression  Work with your healthcare provider to develop a plan that you enjoy   It may help to ask someone to be active with you  · Create a regular sleep schedule  A routine can help you relax before bed  Listen to music, read, or do yoga  Try to go to bed and wake up at the same time every day  Sleep is important for emotional health  · Eat a variety of healthy foods  Healthy foods include fruits, vegetables, whole-grain breads, low-fat dairy products, lean meats, fish, and cooked beans  A healthy meal plan is low in fat, salt, and added sugar  · Do not drink alcohol or use drugs  Alcohol and drugs can make depression worse  Talk to your therapist or doctor if you need help quitting  Follow up with your healthcare provider as directed: Your healthcare provider will monitor your progress at follow-up visits  He or she will also monitor your medicine if you take antidepressants  Your healthcare provider will ask if the medicine is helping  Tell him or her about any side effects or problems you may have with your medicine  The type or amount of medicine may need to be changed  Write down your questions so you remember to ask them during your visits  © Copyright [employer] 2022 Information is for End User's use only and may not be sold, redistributed or otherwise used for commercial purposes  All illustrations and images included in CareNotes® are the copyrighted property of [employer]  or Rosalee Holt   The above information is an  only  It is not intended as medical advice for individual conditions or treatments  Talk to your doctor, nurse or pharmacist before following any medical regimen to see if it is safe and effective for you

## 2023-01-03 NOTE — PROGRESS NOTES
Subjective:      Patient ID: Karla Helton is a 30 y o  male  With recent testicular cancer, hypertension, hyponatremia, status post left orchiectomy presents to  establish care   Depression and anxiety, also has anger outburst, sleeping difficulties, history of manic and hypomanic episodes and irrational decisions  Sister has bipolar 1 and is on Seroquel and fluoxetine  Sister Reynold Arevalo present and patient gave permission to discuss everything in front of him  Past Medical History:   Diagnosis Date   • Anxiety    • Depression    • Psychiatric disorder     bipolar       History reviewed  No pertinent family history  Past Surgical History:   Procedure Laterality Date   • ORCHIECTOMY Left 12/14/2022    Procedure: ORCHIECTOMY INGUINAL;  Surgeon: Nancy Adame MD;  Location: [facility];  Service: Urology        reports that he has been smoking cigarettes  He started smoking about 15 years ago  He has been smoking an average of 1 pack per day  He has never used smokeless tobacco  He reports that he does not currently use alcohol  He reports current drug use  Drug: Marijuana        Current Outpatient Medications:   •  albuterol (ProAir HFA) 90 mcg/act inhaler, Inhale 2 puffs every 6 (six) hours as needed for wheezing, Disp: 8 5 g, Rfl: 0  •  amLODIPine (NORVASC) 10 mg tablet, Take 1 tablet (10 mg total) by mouth daily, Disp: 30 tablet, Rfl: 0  •  docusate sodium (COLACE) 100 mg capsule, Take 1 capsule (100 mg total) by mouth 2 (two) times a day for 15 days, Disp: 30 capsule, Rfl: 0  •  FLUoxetine (PROzac) 10 mg capsule, Take 1 capsule (10 mg total) by mouth daily, Disp: 30 capsule, Rfl: 2  •  losartan (COZAAR) 50 mg tablet, Take 1 tablet (50 mg total) by mouth daily, Disp: 30 tablet, Rfl: 0  •  QUEtiapine (SEROquel) 25 mg tablet, Take 1 tablet (25 mg total) by mouth daily at bedtime, Disp: 30 tablet, Rfl: 0  •  sodium chloride 1 g tablet, Take 2 tablets (2 g total) by mouth 3 (three) times a day with meals, Disp: 30 tablet, Rfl: 0  •  tamsulosin (FLOMAX) 0 4 mg, Take 1 capsule (0 4 mg total) by mouth daily with dinner, Disp: 30 capsule, Rfl: 0  •  torsemide (DEMADEX) 10 mg tablet, Take 1 tablet (10 mg total) by mouth daily, Disp: 30 tablet, Rfl: 0    The following portions of the patient's history were reviewed and updated as appropriate: allergies, current medications, past family history, past medical history, past social history, past surgical history and problem list     Review of Systems   Constitutional: Negative for chills and fever  HENT: Negative for congestion, rhinorrhea and sore throat  Eyes: Negative for discharge, redness and itching  Respiratory: Negative for chest tightness, shortness of breath and wheezing  Cardiovascular: Negative for chest pain and palpitations  Gastrointestinal: Negative for abdominal pain, constipation and diarrhea  Genitourinary: Negative for dysuria  Skin: Negative for pallor and rash  Neurological: Negative for dizziness, weakness, numbness and headaches  Psychiatric/Behavioral: Positive for agitation, behavioral problems, decreased concentration and sleep disturbance  Negative for self-injury and suicidal ideas  The patient is nervous/anxious  PHQ-2/9 Depression Screening    Little interest or pleasure in doing things: 3 - nearly every day  Feeling down, depressed, or hopeless: 3 - nearly every day  Trouble falling or staying asleep, or sleeping too much: 3 - nearly every day  Feeling tired or having little energy: 3 - nearly every day  Poor appetite or overeating: 3 - nearly every day  Feeling bad about yourself - or that you are a failure or have let yourself or your family down: 3 - nearly every day  Trouble concentrating on things, such as reading the newspaper or watching television: 3 - nearly every day  Moving or speaking so slowly that other people could have noticed   Or the opposite - being so fidgety or restless that you have been moving around a lot more than usual: 3 - nearly every day  Thoughts that you would be better off dead, or of hurting yourself in some way: 3 - nearly every day  PHQ-2 Score: 6  PHQ-2 Interpretation: POSITIVE depression screen  PHQ-9 Score: 27   PHQ-9 Interpretation: Severe depression        DARIN-7 Flowsheet Screening    Flowsheet Row Most Recent Value   Over the last 2 weeks, how often have you been bothered by any of the following problems? Feeling nervous, anxious, or on edge 0   Not being able to stop or control worrying 3   Worrying too much about different things 1   Trouble relaxing 2   Being so restless that it is hard to sit still 3   Becoming easily annoyed or irritable 3   Feeling afraid as if something awful might happen 2   DARIN-7 Total Score 14            Objective:    /90 (BP Location: Right arm, Patient Position: Sitting, Cuff Size: Large)   Pulse (!) 124   Temp 98 3 °F (36 8 °C) (Temporal)   Resp 20   Ht 6' 4" (1 93 m)   Wt (!) 138 kg (304 lb)   SpO2 97%   BMI 37 00 kg/m²      Physical Exam  Vitals and nursing note reviewed  Constitutional:       Appearance: Normal appearance  He is obese  HENT:      Right Ear: External ear normal       Left Ear: External ear normal    Eyes:      General:         Right eye: No discharge  Left eye: No discharge  Conjunctiva/sclera: Conjunctivae normal    Cardiovascular:      Rate and Rhythm: Normal rate and regular rhythm  Heart sounds: No murmur heard  Pulmonary:      Effort: Pulmonary effort is normal       Breath sounds: Normal breath sounds  No wheezing  Abdominal:      General: There is no distension  Palpations: Abdomen is soft  Tenderness: There is no abdominal tenderness  Musculoskeletal:      Right lower leg: No edema  Left lower leg: No edema  Skin:     Findings: No lesion or rash  Neurological:      Mental Status: He is alert  Mental status is at baseline     Psychiatric:         Mood and Affect: Mood is anxious and depressed  Behavior: Behavior is cooperative  Thought Content: Thought content normal          Judgment: Judgment is impulsive             Recent Results (from the past 8736 hour(s))   CBC and differential    Collection Time: 07/19/22  9:13 PM   Result Value Ref Range    WBC 13 87 (H) 4 31 - 10 16 Thousand/uL    RBC 4 70 3 88 - 5 62 Million/uL    Hemoglobin 14 9 12 0 - 17 0 g/dL    Hematocrit 43 3 36 5 - 49 3 %    MCV 92 82 - 98 fL    MCH 31 7 26 8 - 34 3 pg    MCHC 34 4 31 4 - 37 4 g/dL    RDW 13 9 11 6 - 15 1 %    MPV 9 7 8 9 - 12 7 fL    Platelets 211 180 - 040 Thousands/uL    nRBC 0 /100 WBCs    Neutrophils Relative 69 43 - 75 %    Immat GRANS % 0 0 - 2 %    Lymphocytes Relative 24 14 - 44 %    Monocytes Relative 6 4 - 12 %    Eosinophils Relative 1 0 - 6 %    Basophils Relative 0 0 - 1 %    Neutrophils Absolute 9 45 (H) 1 85 - 7 62 Thousands/µL    Immature Grans Absolute 0 06 0 00 - 0 20 Thousand/uL    Lymphocytes Absolute 3 37 0 60 - 4 47 Thousands/µL    Monocytes Absolute 0 78 0 17 - 1 22 Thousand/µL    Eosinophils Absolute 0 16 0 00 - 0 61 Thousand/µL    Basophils Absolute 0 05 0 00 - 0 10 Thousands/µL   Comprehensive metabolic panel    Collection Time: 07/19/22  9:13 PM   Result Value Ref Range    Sodium 134 (L) 135 - 147 mmol/L    Potassium 4 4 3 5 - 5 3 mmol/L    Chloride 101 96 - 108 mmol/L    CO2 27 21 - 32 mmol/L    ANION GAP 6 4 - 13 mmol/L    BUN 13 5 - 25 mg/dL    Creatinine 0 97 0 60 - 1 30 mg/dL    Glucose 92 65 - 140 mg/dL    Calcium 9 0 8 4 - 10 2 mg/dL    AST 25 13 - 39 U/L    ALT 19 7 - 52 U/L    Alkaline Phosphatase 61 34 - 104 U/L    Total Protein 7 4 6 4 - 8 4 g/dL    Albumin 4 1 3 5 - 5 0 g/dL    Total Bilirubin 0 48 0 20 - 1 00 mg/dL    eGFR 100 ml/min/1 73sq m   TSH    Collection Time: 07/19/22  9:13 PM   Result Value Ref Range    TSH 3RD GENERATON 3 528 0 450 - 4 500 uIU/mL   FLU/RSV/COVID - if FLU/RSV clinically relevant    Collection Time: 07/19/22  9:13 PM    Specimen: Nose; Nares   Result Value Ref Range    SARS-CoV-2 Negative Negative    INFLUENZA A PCR Negative Negative    INFLUENZA B PCR Negative Negative    RSV PCR Negative Negative   Lyme Total Antibody Profile with reflex to WB    Collection Time: 07/19/22  9:13 PM   Result Value Ref Range    Lyme Total Antibodies 4 9 (H) 0 2 - 8 0 AI   Vitamin B12    Collection Time: 07/19/22  9:13 PM   Result Value Ref Range    Vitamin B-12 675 100 - 900 pg/mL   Folate    Collection Time: 07/19/22  9:13 PM   Result Value Ref Range    Folate 10 1 3 1 - 17 5 ng/mL   Magnesium    Collection Time: 07/19/22  9:13 PM   Result Value Ref Range    Magnesium 2 6 1 9 - 2 7 mg/dL   CK    Collection Time: 07/19/22  9:13 PM   Result Value Ref Range    Total  39 - 308 U/L   Babesia microti antibody, IgG & Igm    Collection Time: 07/19/22  9:13 PM   Result Value Ref Range    Babesia microti IgG <1:10 Neg:<1:10    Babesia microti IgM <1:10 Neg:<1:10   Lyme Western Blot, Serum    Collection Time: 07/19/22  9:13 PM   Result Value Ref Range    Lyme 18 kD IgG Absent     Lyme 23 kD IgG Absent     Lyme 28 kD IgG Absent     Lyme 30 kD IgG Absent     Lyme 39 kD IgG Absent     Lyme 41 kD IgG Absent     Lyme 45 kD IgG Absent     Lyme 58 kD IgG Absent     Lyme 66 kD IgG Absent     Lyme 93 kD IgG Absent     Lyme 23 kD IgM Present (A)     Lyme 39 kD IgM Absent     Lyme 41 kD IgM Absent     Lyme IgG WB Interp  Negative     Lyme IgM WB Interp   Negative    CBC and differential    Collection Time: 12/10/22  5:53 PM   Result Value Ref Range    WBC 10 46 (H) 4 31 - 10 16 Thousand/uL    RBC 4 39 3 88 - 5 62 Million/uL    Hemoglobin 14 6 12 0 - 17 0 g/dL    Hematocrit 41 1 36 5 - 49 3 %    MCV 94 82 - 98 fL    MCH 33 3 26 8 - 34 3 pg    MCHC 35 5 31 4 - 37 4 g/dL    RDW 12 4 11 6 - 15 1 %    MPV 8 8 (L) 8 9 - 12 7 fL    Platelets 199 511 - 324 Thousands/uL    nRBC 0 /100 WBCs    Neutrophils Relative 63 43 - 75 %    Immat GRANS % 1 0 - 2 % Lymphocytes Relative 25 14 - 44 %    Monocytes Relative 8 4 - 12 %    Eosinophils Relative 2 0 - 6 %    Basophils Relative 1 0 - 1 %    Neutrophils Absolute 6 66 1 85 - 7 62 Thousands/µL    Immature Grans Absolute 0 14 0 00 - 0 20 Thousand/uL    Lymphocytes Absolute 2 61 0 60 - 4 47 Thousands/µL    Monocytes Absolute 0 80 0 17 - 1 22 Thousand/µL    Eosinophils Absolute 0 17 0 00 - 0 61 Thousand/µL    Basophils Absolute 0 08 0 00 - 0 10 Thousands/µL   Hepatic function panel    Collection Time: 12/10/22  5:53 PM   Result Value Ref Range    Total Bilirubin 0 34 0 20 - 1 00 mg/dL    Bilirubin, Direct 0 07 0 00 - 0 20 mg/dL    Alkaline Phosphatase 92 34 - 104 U/L    AST 23 13 - 39 U/L    ALT 51 7 - 52 U/L    Total Protein 7 4 6 4 - 8 4 g/dL    Albumin 4 3 3 5 - 5 0 g/dL   Basic metabolic panel    Collection Time: 12/10/22  5:53 PM   Result Value Ref Range    Sodium 129 (L) 135 - 147 mmol/L    Potassium 3 6 3 5 - 5 3 mmol/L    Chloride 92 (L) 96 - 108 mmol/L    CO2 29 21 - 32 mmol/L    ANION GAP 8 4 - 13 mmol/L    BUN 13 5 - 25 mg/dL    Creatinine 0 88 0 60 - 1 30 mg/dL    Glucose 86 65 - 140 mg/dL    Calcium 9 4 8 4 - 10 2 mg/dL    eGFR 111 ml/min/1 73sq m   Protime-INR    Collection Time: 12/10/22  5:53 PM   Result Value Ref Range    Protime 12 3 11 6 - 14 5 seconds    INR 0 88 0 84 - 1 19   APTT    Collection Time: 12/10/22  5:53 PM   Result Value Ref Range    PTT 25 23 - 37 seconds   TSH    Collection Time: 12/10/22  5:53 PM   Result Value Ref Range    TSH 3RD GENERATON 1 295 0 450 - 4 500 uIU/mL   Magnesium    Collection Time: 12/10/22  5:53 PM   Result Value Ref Range    Magnesium 2 0 1 9 - 2 7 mg/dL   Phosphorus    Collection Time: 12/10/22  5:53 PM   Result Value Ref Range    Phosphorus 3 3 2 7 - 4 5 mg/dL   Lipase    Collection Time: 12/10/22  5:53 PM   Result Value Ref Range    Lipase 40 11 - 82 u/L   Lactic acid    Collection Time: 12/10/22  5:53 PM   Result Value Ref Range    LACTIC ACID 0 9 0 5 - 2 0 mmol/L   B-Type Natriuretic Peptide(BNP) AN, CA, EA Campuses Only    Collection Time: 12/10/22  5:53 PM   Result Value Ref Range    BNP 7 0 - 100 pg/mL   UA (URINE) with reflex to Scope    Collection Time: 12/10/22  5:53 PM   Result Value Ref Range    Color, UA Yellow Yellow    Clarity, UA Clear Clear    Specific Gravity, UA 1 020 1 001 - 1 030    pH, UA 6 0 5 0, 5 5, 6 0, 6 5, 7 0, 7 5, 8 0    Leukocytes, UA Negative Negative    Nitrite, UA Negative Negative    Protein, UA Negative Negative, Interference- unable to analyze mg/dl    Glucose, UA Negative Negative mg/dl    Ketones, UA Negative Negative mg/dl    Urobilinogen, UA 0 2 0 2, 1 0 E U /dl E U /dl    Bilirubin, UA Negative Negative    Occult Blood, UA Negative Negative   Uric acid    Collection Time: 12/10/22  5:53 PM   Result Value Ref Range    Uric Acid 3 4 (L) 3 5 - 8 5 mg/dL   Hemoglobin A1C    Collection Time: 12/10/22  5:53 PM   Result Value Ref Range    Hemoglobin A1C 5 2 Normal 3 8-5 6%; PreDiabetic 5 7-6 4%;  Diabetic >=6 5%; Glycemic control for adults with diabetes <7 0% %     mg/dl   Lipid panel    Collection Time: 12/10/22  5:53 PM   Result Value Ref Range    Cholesterol 183 See Comment mg/dL    Triglycerides 280 (H) See Comment mg/dL    HDL, Direct 45 >=40 mg/dL    LDL Calculated 82 0 - 100 mg/dL    Non-HDL-Chol (CHOL-HDL) 138 mg/dl   ECG 12 lead    Collection Time: 12/10/22  6:06 PM   Result Value Ref Range    Ventricular Rate 98 BPM    Atrial Rate 98 BPM    AZ Interval 164 ms    QRSD Interval 88 ms    QT Interval 354 ms    QTC Interval 451 ms    P Axis 50 degrees    QRS Axis 79 degrees    T Wave [place] 48 degrees   HS Troponin 0hr (reflex protocol)    Collection Time: 12/10/22 10:43 PM   Result Value Ref Range    hs TnI 0hr 3 "Refer to ACS Flowchart"- see link ng/L   COVID/FLU/RSV    Collection Time: 12/10/22 10:46 PM    Specimen: Nose; Nares   Result Value Ref Range    SARS-CoV-2 Negative Negative    INFLUENZA A PCR Negative Negative INFLUENZA B PCR Negative Negative    RSV PCR Negative Negative   Sodium, urine, random    Collection Time: 12/11/22 12:07 AM   Result Value Ref Range    Sodium, Ur 73    Osmolality, urine    Collection Time: 12/11/22 12:07 AM   Result Value Ref Range    Osmolality, Ur 729 250 - 900 mmol/KG   HS Troponin I 2hr    Collection Time: 12/11/22 12:44 AM   Result Value Ref Range    hs TnI 2hr 3 "Refer to ACS Flowchart"- see link ng/L    Delta 2hr hsTnI 0 <20 ng/L   Cortisol Level, AM Specimen    Collection Time: 12/11/22  4:44 AM   Result Value Ref Range    Cortisol - AM 5 6 4 2 - 22 4 ug/dL   AFP tumor marker    Collection Time: 12/11/22  4:44 AM   Result Value Ref Range    AFP TUMOR MARKER 1 4 0 5 - 8 ng/mL   Lactate dehydrogenase    Collection Time: 12/11/22  4:44 AM   Result Value Ref Range     140 - 271 U/L   hCG, serum, qualitative    Collection Time: 12/11/22  4:44 AM   Result Value Ref Range    Preg, Serum Negative Negative   Basic metabolic panel    Collection Time: 12/11/22  4:44 AM   Result Value Ref Range    Sodium 131 (L) 135 - 147 mmol/L    Potassium 3 5 3 5 - 5 3 mmol/L    Chloride 97 96 - 108 mmol/L    CO2 24 21 - 32 mmol/L    ANION GAP 10 4 - 13 mmol/L    BUN 12 5 - 25 mg/dL    Creatinine 0 73 0 60 - 1 30 mg/dL    Glucose 91 65 - 140 mg/dL    Calcium 8 9 8 4 - 10 2 mg/dL    eGFR 120 ml/min/1 73sq m   CBC and differential    Collection Time: 12/11/22  4:44 AM   Result Value Ref Range    WBC 9 50 4 31 - 10 16 Thousand/uL    RBC 4 22 3 88 - 5 62 Million/uL    Hemoglobin 14 0 12 0 - 17 0 g/dL    Hematocrit 39 4 36 5 - 49 3 %    MCV 93 82 - 98 fL    MCH 33 2 26 8 - 34 3 pg    MCHC 35 5 31 4 - 37 4 g/dL    RDW 12 8 11 6 - 15 1 %    MPV 9 2 8 9 - 12 7 fL    Platelets 295 364 - 365 Thousands/uL    nRBC 0 /100 WBCs    Neutrophils Relative 62 43 - 75 %    Immat GRANS % 1 0 - 2 %    Lymphocytes Relative 27 14 - 44 %    Monocytes Relative 7 4 - 12 %    Eosinophils Relative 2 0 - 6 %    Basophils Relative 1 0 - 1 %    Neutrophils Absolute 5 88 1 85 - 7 62 Thousands/µL    Immature Grans Absolute 0 12 0 00 - 0 20 Thousand/uL    Lymphocytes Absolute 2 56 0 60 - 4 47 Thousands/µL    Monocytes Absolute 0 69 0 17 - 1 22 Thousand/µL    Eosinophils Absolute 0 19 0 00 - 0 61 Thousand/µL    Basophils Absolute 0 06 0 00 - 0 10 Thousands/µL   D-dimer, quantitative    Collection Time: 12/11/22  7:57 PM   Result Value Ref Range    D-Dimer, Quant <0 27 <0 50 ug/ml FEU   Osmolality-"If this is regarding a toxic alcohol, STOP  Test is not routinely indicated   Please consult medical  on call for further guidance "    Collection Time: 12/12/22  4:34 AM   Result Value Ref Range    Osmolality Serum 275 (L) 282 - 298 mmol/KG   CBC    Collection Time: 12/12/22  4:34 AM   Result Value Ref Range    WBC 9 71 4 31 - 10 16 Thousand/uL    RBC 4 22 3 88 - 5 62 Million/uL    Hemoglobin 14 2 12 0 - 17 0 g/dL    Hematocrit 40 0 36 5 - 49 3 %    MCV 95 82 - 98 fL    MCH 33 6 26 8 - 34 3 pg    MCHC 35 5 31 4 - 37 4 g/dL    RDW 12 5 11 6 - 15 1 %    Platelets 487 051 - 749 Thousands/uL    MPV 9 1 8 9 - 12 7 fL   Basic metabolic panel    Collection Time: 12/12/22  4:34 AM   Result Value Ref Range    Sodium 129 (L) 135 - 147 mmol/L    Potassium 3 9 3 5 - 5 3 mmol/L    Chloride 94 (L) 96 - 108 mmol/L    CO2 25 21 - 32 mmol/L    ANION GAP 10 4 - 13 mmol/L    BUN 10 5 - 25 mg/dL    Creatinine 0 76 0 60 - 1 30 mg/dL    Glucose 89 65 - 140 mg/dL    Calcium 9 4 8 4 - 10 2 mg/dL    eGFR 118 ml/min/1 73sq m   HCG, tumor marker    Collection Time: 12/12/22  4:34 AM   Result Value Ref Range    hCG Tumor Marker <2 <5 mlU/mL   Echo complete w/ contrast if indicated    Collection Time: 12/12/22 12:36 PM   Result Value Ref Range    LA size 3 7 cm    LVPWd 1 20 cm    Left Atrium Area-systolic Four Chamber 04 6 cm2    MV E' Tissue Velocity Septal 9 cm/s    RA 2D Volume 22 0 mL    IVSd 2 88 cm    LV DIASTOLIC VOLUME (MOD BIPLANE) 2D 68 mL    LEFT VENTRICLE SYSTOLIC VOLUME (MOD BIPLANE) 2D 30 mL    Left ventricular stroke volume (2D) 37 00 mL    A4C EF 66 %    LVIDd 3 90 cm    IVS 1 2 cm    LVIDS 2 80 cm    FS 28 28 - 44 %    Ao root 3 20 cm    RVID d 2 6 cm    MV valve area p 1/2 method 3 55 cm2    E wave deceleration time 214 ms    MV Peak E Tono 65 cm/s    MV Peak A Tono 0 53 m/s    RAA A4C 11 1 cm2    MV stenosis pressure 1/2 time 62 ms    LVSV, 2D 37 mL    LV EF 60    Basic metabolic panel    Collection Time: 12/13/22  4:41 AM   Result Value Ref Range    Sodium 127 (L) 135 - 147 mmol/L    Potassium 3 9 3 5 - 5 3 mmol/L    Chloride 94 (L) 96 - 108 mmol/L    CO2 24 21 - 32 mmol/L    ANION GAP 9 4 - 13 mmol/L    BUN 14 5 - 25 mg/dL    Creatinine 0 78 0 60 - 1 30 mg/dL    Glucose 91 65 - 140 mg/dL    Calcium 9 1 8 4 - 10 2 mg/dL    eGFR 116 ml/min/1 73sq m   CBC and differential    Collection Time: 12/13/22  4:41 AM   Result Value Ref Range    WBC 10 03 4 31 - 10 16 Thousand/uL    RBC 4 17 3 88 - 5 62 Million/uL    Hemoglobin 13 9 12 0 - 17 0 g/dL    Hematocrit 39 3 36 5 - 49 3 %    MCV 94 82 - 98 fL    MCH 33 3 26 8 - 34 3 pg    MCHC 35 4 31 4 - 37 4 g/dL    RDW 12 5 11 6 - 15 1 %    MPV 9 0 8 9 - 12 7 fL    Platelets 859 999 - 304 Thousands/uL    nRBC 0 /100 WBCs    Neutrophils Relative 69 43 - 75 %    Immat GRANS % 1 0 - 2 %    Lymphocytes Relative 21 14 - 44 %    Monocytes Relative 8 4 - 12 %    Eosinophils Relative 1 0 - 6 %    Basophils Relative 0 0 - 1 %    Neutrophils Absolute 6 82 1 85 - 7 62 Thousands/µL    Immature Grans Absolute 0 12 0 00 - 0 20 Thousand/uL    Lymphocytes Absolute 2 13 0 60 - 4 47 Thousands/µL    Monocytes Absolute 0 78 0 17 - 1 22 Thousand/µL    Eosinophils Absolute 0 14 0 00 - 0 61 Thousand/µL    Basophils Absolute 0 04 0 00 - 0 10 Thousands/µL   Basic metabolic panel    Collection Time: 12/13/22 12:26 PM   Result Value Ref Range    Sodium 125 (L) 135 - 147 mmol/L    Potassium 3 6 3 5 - 5 3 mmol/L    Chloride 92 (L) 96 - 108 mmol/L    CO2 24 21 - 32 mmol/L    ANION GAP 9 4 - 13 mmol/L    BUN 12 5 - 25 mg/dL    Creatinine 0 77 0 60 - 1 30 mg/dL    Glucose 114 65 - 140 mg/dL    Calcium 9 2 8 4 - 10 2 mg/dL    eGFR 117 ml/min/1 73sq m   Cortisol    Collection Time: 12/13/22  2:31 PM   Result Value Ref Range    Cortisol, Random 4 1 ug/dL   ACTH    Collection Time: 12/13/22  2:31 PM   Result Value Ref Range    ACTH 36 2 7 2 - 63 3 pg/mL   Cortisol Stimulation Test    Collection Time: 12/13/22  2:31 PM   Result Value Ref Range    Cortisol, Random 17 1 ug/dL   Cortisol    Collection Time: 12/13/22  4:40 PM   Result Value Ref Range    Cortisol, Random 22 4 ug/dL   Basic metabolic panel    Collection Time: 12/13/22 11:53 PM   Result Value Ref Range    Sodium 129 (L) 135 - 147 mmol/L    Potassium 3 5 3 5 - 5 3 mmol/L    Chloride 95 (L) 96 - 108 mmol/L    CO2 24 21 - 32 mmol/L    ANION GAP 10 4 - 13 mmol/L    BUN 16 5 - 25 mg/dL    Creatinine 0 88 0 60 - 1 30 mg/dL    Glucose 111 65 - 140 mg/dL    Calcium 9 0 8 3 - 10 1 mg/dL    eGFR 111 ml/min/1 73sq m   Basic metabolic panel    Collection Time: 12/14/22  4:32 AM   Result Value Ref Range    Sodium 130 (L) 135 - 147 mmol/L    Potassium 3 5 3 5 - 5 3 mmol/L    Chloride 94 (L) 96 - 108 mmol/L    CO2 28 21 - 32 mmol/L    ANION GAP 8 4 - 13 mmol/L    BUN 14 5 - 25 mg/dL    Creatinine 0 85 0 60 - 1 30 mg/dL    Glucose 105 65 - 140 mg/dL    Calcium 9 2 8 3 - 10 1 mg/dL    eGFR 112 ml/min/1 73sq m   Magnesium    Collection Time: 12/14/22  4:32 AM   Result Value Ref Range    Magnesium 2 6 1 6 - 2 6 mg/dL   CBC and differential    Collection Time: 12/14/22  4:32 AM   Result Value Ref Range    WBC 11 31 (H) 4 31 - 10 16 Thousand/uL    RBC 4 38 3 88 - 5 62 Million/uL    Hemoglobin 14 6 12 0 - 17 0 g/dL    Hematocrit 41 2 36 5 - 49 3 %    MCV 94 82 - 98 fL    MCH 33 3 26 8 - 34 3 pg    MCHC 35 4 31 4 - 37 4 g/dL    RDW 12 6 11 6 - 15 1 %    MPV 8 8 (L) 8 9 - 12 7 fL    Platelets 020 817 - 732 Thousands/uL    nRBC 0 /100 WBCs Neutrophils Relative 73 43 - 75 %    Immat GRANS % 1 0 - 2 %    Lymphocytes Relative 17 14 - 44 %    Monocytes Relative 7 4 - 12 %    Eosinophils Relative 1 0 - 6 %    Basophils Relative 1 0 - 1 %    Neutrophils Absolute 8 23 (H) 1 85 - 7 62 Thousands/µL    Immature Grans Absolute 0 11 0 00 - 0 20 Thousand/uL    Lymphocytes Absolute 1 97 0 60 - 4 47 Thousands/µL    Monocytes Absolute 0 84 0 17 - 1 22 Thousand/µL    Eosinophils Absolute 0 10 0 00 - 0 61 Thousand/µL    Basophils Absolute 0 06 0 00 - 0 10 Thousands/µL   Tissue Exam    Collection Time: 12/14/22  9:24 AM   Result Value Ref Range    Case Report       Surgical Pathology Report                         Case: [id number]                                   Authorizing Provider:  Archana Mcintyre MD    Collected:           12/14/2022 [id number]              Ordering Location:     [address] [place] [address]      Received:            12/14/2022 [address] [facility]                                                      Pathologist:           Vera Sifuentes DO                                                            Specimen:    Testis, Left, LEFT TESTICLE AND SPERMATIC CORD                                             Final Diagnosis       A  Left testicle and spermatic cord, radical orchiectomy:  -Seminoma, 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   -Largest viable tumor focus measures 0 9 cm in greatest dimension    -All margins negative for carcinoma   -Definitive lymph-vascular invasion not identified, see note  -Extensive necrosis and fibrosis present  -SEE SYNOPTIC REPORT    Comment: Tumor cells are positive for OCT3/4 and  and negative for CD45, CK-AE1/AE3, CD30, glypican-3, AFP, Beta-HCG, and inhibin      Note       Small focus of tumor is present within lymphvascular space without surrounding fibrin or clot  This is favored to represent carryover      Intradepartmental consultation (AT) is in agreement    Chepe Bhandari was notified by Dr Larry Mendez via [employer] at 15:03 on 12/21/2022  Additional Information       All reported additional testing was performed with appropriately reactive controls  These tests were developed and their performance characteristics determined by [facility] or appropriate performing facility, though some tests may be performed on tissues which have not been validated for performance characteristics (such as staining performed on alcohol exposed cell blocks and decalcified tissues)  Results should be interpreted with caution and in the context of the patients’ clinical condition  These tests may not be cleared or approved by the [place]  Food and Drug Administration, though the FDA has determined that such clearance or approval is not necessary  These tests are used for clinical purposes and they should not be regarded as investigational or for research  This laboratory has been approved by [place] [id number], designated as a high-complexity laboratory and is qualified to perform these tests  Interpretation performed at [facility] [place] [address]      Synoptic Checklist       TESTIS: Radical Orchiectomy   TESTIS: RADICAL ORCHIECTOMY - All Specimens   8th Edition - Protocol posted: 11/10/2021         CLINICAL      Pre-Orchiectomy Serum Tumor Markers:    Serum marker studies within normal limits       Post-Orchiectomy Serum Tumor Markers:    Unknown       Serum Tumor Markers (S):    S0 (serum marker study levels within normal limits)       SPECIMEN      Specimen Laterality:    Left       TUMOR      Tumor Focality:    Unifocal       Tumor Size:    Greatest dimension of main tumor mass (Centimeters): 4 5 cm      Histologic Type:    Seminoma       Tumor Extent:    Limited to testis       Lymphovascular Invasion:    Not identified       MARGINS      Margin Status:     All margins negative for tumor       REGIONAL LYMPH NODES Regional Lymph Node Status:    Not applicable (no regional lymph nodes submitted or found)       PATHOLOGIC STAGE CLASSIFICATION (pTNM, AJCC 8th Edition)      Reporting of pT, pN, and (when applicable) pM categories is based on information available to the pathologist at the time the report is issued  As per the AJCC (Chapter 1, 8th Ed ) it is the managing physician’s responsibility to establish the final pathologic stage based upon all pertinent information, including but potentially not limited to this pathology report  pT Category:    pT1b       pN Category:    pN not assigned (no nodes submitted or found)       Gross Description       A  The specimen is received in formalin, labeled with the patient's name and hospital number, and is designated "left testicle and spermatic cord " It consists of a 99 0 g (after fixation) left testicle to include: epididymis and spermatic cord  The spermatic cord is tan, contains attached lobules of adipose tissue, is focally shaggy and measures 5 7 cm in length by 2 1 cm in average diameter  Sectioning the spermatic cord reveals grossly unremarkable cut surfaces  No lymph node candidates are identified in the attached soft tissue  The testicle measures 5 2 x 4 2 x 3 5 cm  The tunica vaginalis is tan-purple, rubbery, stretched, easily movable, and contains a small amount of attached soft tissue  No lymph node candidates are grossly identified within the attached soft tissue  The testicle is bivalved to reveal fluid trapped between the tunica albuginea and the tunica vaginalis  The tunica albuginea is tan-white, intact, and glistening  Occupying the entire testicle is a 4 5 x 3 8 x 1 5 cm tan-yellow, soft, and multi-nodular mass  The mass abuts the tunica albuginea, is 0 4 cm from the closest tunica vaginalis, 0 3 cm from the epididymis, and more than 3 0 cm from the spermatic cord resection margin  Sectioning the mass reveals tan-yellow and soft cut surfaces   The mass appears to be grossly confined to the epididymis  No uninvolved testicular parenchyma is grossly identified  A C-shaped and distorted epididymis is present and measures 5 5 x 1 3 cm  Sectioning the epidiymis reveals tan and lobulated cut surfaces  Digital images are taken  Representative sections to include more than one section per cm of mass are submitted as follows:    A1: Spermatic cord resection margin, en face (away from the testicle)  A2: Midportion of the spermatic cord  A3: Distal portion of the spermatic cord (adjacent to the testicle)  A4: Full-thickness section of mass to include the closest tunica albuginea and the closest tunica vaginalis  A5-A6: Full-thickness sections of mass to include the tunicae  A7-A8: Mass to demonstrate its relationship to the closest epididymis  A9-A10: Additional full-thickness sections of mass  A11: Thickened tunica vaginalis    Note: The estimated total formalin fixation time based upon information provided by the submitting clinician and the standard processing schedule is under 72 hours      HPolster      Clinical Information AFP, HCG, LDH WNL    Basic metabolic panel    Collection Time: 12/14/22  1:14 PM   Result Value Ref Range    Sodium 128 (L) 135 - 147 mmol/L    Potassium 3 9 3 5 - 5 3 mmol/L    Chloride 95 (L) 96 - 108 mmol/L    CO2 24 21 - 32 mmol/L    ANION GAP 9 4 - 13 mmol/L    BUN 15 5 - 25 mg/dL    Creatinine 1 07 0 60 - 1 30 mg/dL    Glucose 154 (H) 65 - 140 mg/dL    Calcium 9 3 8 3 - 10 1 mg/dL    eGFR 89 ml/min/1 73sq m       Laboratory Results: I have personally reviewed the pertinent laboratory results/reports         Assessment/Plan:  Problem List Items Addressed This Visit        Other    Testicular mass    Relevant Medications    losartan (COZAAR) 50 mg tablet    amLODIPine (NORVASC) 10 mg tablet    torsemide (DEMADEX) 10 mg tablet    sodium chloride 1 g tablet   Other Visit Diagnoses     B[facility] disorder [facility] ([facility] )    -  Primary Relevant Medications    QUEtiapine (SEROquel) 25 mg tablet    FLUoxetine (PROzac) 10 mg capsule    Other Relevant Orders    Ambulatory Referral to Psychiatry    Ambulatory Referral to [facility]          Refer to psychiatry and behavioral therapy  He is Bipolar 1 with depression, sister has the same  Start seroquel and fluoxetine  Follow up with heme onc today  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  I reviewed his most recent labs and imaging                Read package inserts for all medications before starting a new medications, call me if you have any questions  Patient was given opportunity to ask questions and all questions were answered  Disclaimer: Portions of the record may have been created with voice recognition software  Occasional wrong word or "sound a like" substitutions may have occurred due to the inherent limitations of voice recognition software  Read the chart carefully and recognize, using context, where substitutions have occurred  I have used the Epic copy/forward function to compose this note  I have reviewed my current note to ensure it reflects the current patient status, exam, assessment and plan  Depression Screening Follow-up Plan: Patient's depression screening was positive with a PHQ-2 score of 6  Their PHQ-9 score was 27  Patient assessed for underlying major depression  They have no active suicidal ideations  Brief counseling provided and recommend additional follow-up/re-evaluation next office visit

## 2023-01-03 NOTE — PROGRESS NOTES
Met with patient today in office for his MED ONC appt and introduced myself  Sperm banking information was given to patient  Bx of lymph nodes being completed  Depending on results of Bx he may or may not need sperm banking  We will wait for results on lymph node bx and sperm banking will be determined at that point my MED ONC

## 2023-01-03 NOTE — PROGRESS NOTES
Outreach to [facility] and spoke with Silvana Sharp following up on message left requesting call to pt to discuss process of pserm banking  Silvana Sharp did reach out to pt and left voicemail for him but had not heard back    Informed her pt will be offered chemo today and she provided direct number to be given to pt during Medical Onc consult, which Schuyler Schmitt, Care Coordinator, will be attending

## 2023-01-03 NOTE — H&P (VIEW-ONLY)
Oncology Consult Note  Antonietta Perez 30 y o  male MRN: [id number]  Unit/Bed#:  Encounter: [id number]      Presenting Complaint: Left side seminoma    History of Presenting Illness: Antonietta Perez is seen for initial consultation 1/3/2023 at the referral of Naomy Owusu MD     43-year-old  male with history of hypertension, morbid obesity, periumbilical hernia, fatty liver, fluid retention, who felt a mass in the left testicle, on 6/18/2021 he was seen in the emergency room, ultrasound showed heterogeneous mass suspicious for malignancy referred for urology however he did not show up    According to the patient this mass has been growing up slowly, requesting evaluation by urology on 12/2022, CT scan of the abdomen and pelvis showed abnormal appearing retroperitoneal lymph nodes for example aortocaval lymph node measuring 2 2 x 2 2 cm, retrocaval lymph node measuring 1 6 x 1 5 cm, enlarged left iliac lymph node measuring 2 x 2 centimeter, fat stranding on the right anterior common/external iliac artery    Ultrasound of the scrotum showed left testicular nodules consistent with neoplasm and a small hydrocele the largest measuring 2 3 x 1 8 x 2 cm in the upper pole    Alpha-fetoprotein, hCG, LDH were normal before the surgery    Status post radical left orchiectomy on 12/14/2022    Final Diagnosis   A  Left testicle and spermatic cord, radical orchiectomy:  -Seminoma, 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   -Largest viable tumor focus measures 0 9 cm in greatest dimension    -All margins negative for carcinoma   -Definitive lymph-vascular invasion not identified, see note  -Extensive necrosis and fibrosis present     -SEE SYNOPTIC REPORT     Comment: Tumor cells are positive for OCT3/4 and  and negative for CD45, CK-AE1/AE3, CD30, glypican-3, AFP, Beta-HCG, and inhibin   Electronically signed by Katherene Gowers, DO on 12/21/2022 at  3:04 PM   Note    Small focus of tumor is present within lymphvascular space without surrounding fibrin or clot  This is favored to represent carryover  CLINICAL   Pre-Orchiectomy Serum Tumor Markers  Serum marker studies within normal limits    Post-Orchiectomy Serum Tumor Markers  Unknown    Serum Tumor Markers (S)  S0 (serum marker study levels within normal limits)    SPECIMEN   Specimen Laterality  Left    TUMOR   Tumor Focality  Unifocal    Tumor Size  Greatest dimension of main tumor mass (Centimeters): 4 5 cm   Histologic Type  Seminoma    Tumor Extent  Limited to testis    Lymphovascular Invasion  Not identified    MARGINS   Margin Status  All margins negative for tumor    REGIONAL LYMPH NODES   Regional Lymph Node Status  Not applicable (no regional lymph nodes submitted or found)      Stage I (pT1b, PN X, MX, S 0)    He smokes 1 pack of cigarette daily for many years, he has 3 children, his father  with lung cancer    He reported severe acid reflux with tenderness in the epigastrium area denied any headache blurred vision chest pain he reported dyspnea on exertion abdominal pain denied any dysuria hematuria melena hematochezia or change in bowel habits    He does not drink alcohol  Review of Systems - As stated in the HPI otherwise the fourteen point review of systems was negative      Past Medical History:   Diagnosis Date   • Anxiety    • Depression    • Psychiatric disorder     bipolar       Social History     Socioeconomic History   • Marital status: Single     Spouse name: Not on file   • Number of children: Not on file   • Years of education: Not on file   • Highest education level: Not on file   Occupational History   • Not on file   Tobacco Use   • Smoking status: Every Day     Packs/day: 1 00     Types: Cigarettes     Start date: 2008     Last attempt to quit: 2022     Years since quittin 0   • Smokeless tobacco: Never   Vaping Use   • Vaping Use: Never used   Substance and Sexual Activity   • Alcohol use: Not Currently Comment: 2 cases of beer daily, stopped 3 years ago   • Drug use: Yes     Types: Marijuana     Comment: daily   • Sexual activity: Not Currently   Other Topics Concern   • Not on file   Social History Narrative   • Not on file     Social Determinants of Health     Financial Resource Strain: Not on file   Food Insecurity: Not on file   Transportation Needs: Not on file   Physical Activity: Not on file   Stress: Not on file   Social Connections: Not on file   Intimate Partner Violence: Not on file   Housing Stability: Not on file       No family history on file      No Known Allergies      Current Outpatient Medications:   •  albuterol (ProAir HFA) 90 mcg/act inhaler, Inhale 2 puffs every 6 (six) hours as needed for wheezing, Disp: 8 5 g, Rfl: 0  •  amLODIPine (NORVASC) 10 mg tablet, Take 1 tablet (10 mg total) by mouth daily, Disp: 30 tablet, Rfl: 0  •  docusate sodium (COLACE) 100 mg capsule, Take 1 capsule (100 mg total) by mouth 2 (two) times a day for 15 days, Disp: 30 capsule, Rfl: 0  •  FLUoxetine (PROzac) 10 mg capsule, Take 1 capsule (10 mg total) by mouth daily, Disp: 30 capsule, Rfl: 2  •  losartan (COZAAR) 50 mg tablet, Take 1 tablet (50 mg total) by mouth daily, Disp: 30 tablet, Rfl: 0  •  QUEtiapine (SEROquel) 25 mg tablet, Take 1 tablet (25 mg total) by mouth daily at bedtime, Disp: 30 tablet, Rfl: 0  •  sodium chloride 1 g tablet, Take 2 tablets (2 g total) by mouth 3 (three) times a day with meals, Disp: 30 tablet, Rfl: 0  •  tamsulosin (FLOMAX) 0 4 mg, Take 1 capsule (0 4 mg total) by mouth daily with dinner, Disp: 30 capsule, Rfl: 0  •  torsemide (DEMADEX) 10 mg tablet, Take 1 tablet (10 mg total) by mouth daily, Disp: 30 tablet, Rfl: 0      /100 (BP Location: Left arm, Patient Position: Sitting, Cuff Size: Adult)   Pulse (!) 121   Temp (!) 97 °F (36 1 °C)   Resp 17   Ht 6' 4" (1 93 m)   Wt (!) 139 kg (306 lb)   SpO2 98%   BMI 37 25 kg/m²       General Appearance:    Alert, oriented, obese       Eyes:    PERRL   Ears:    Normal external ear canals, both ears   Nose:   Nares normal, septum midline   Throat:   Mucosa moist  Pharynx without injection  Neck:   Supple       Lungs:     Clear to auscultation bilaterally   Chest Wall:    No tenderness or deformity    Heart:    Regular rate and rhythm       Abdomen:     Soft, tender in the epigastrium and left upper quadrant, bowel sounds +, no organomegaly           Extremities:   Extremities no cyanosis or edema       Skin:   no rash or icterus  Lymph nodes:   Cervical, supraclavicular, and axillary nodes normal   Neurologic:   CNII-XII intact, normal strength, sensation and reflexes     Throughout               No results found for this or any previous visit (from the past 48 hour(s))  XR chest 1 view portable    Result Date: 12/11/2022  Narrative: CHEST INDICATION:   SOB, fluid retention  COMPARISON:  CXR 4/29/2013, abdomen CT 12/10/2022  EXAM PERFORMED/VIEWS:  XR CHEST PORTABLE FINDINGS: Cardiomediastinal silhouette appears unremarkable  The lungs are clear  No pneumothorax or pleural effusion  Old right clavicle fracture  Impression: No acute cardiopulmonary disease  Workstation performed: [id number]     US scrotum and testicles    Result Date: 12/12/2022  Narrative: SCROTAL ULTRASOUND INDICATION:    Suspected testicular CA  COMPARISON: CT scan 12/10/2022 and ultrasound scrotum 6/18/2021 TECHNIQUE:   Ultrasound the scrotal contents was performed with a high frequency linear transducer utilizing volumetric sweep imaging as well as standard still image techniques  Imaging performed in longitudinal and transverse orientation  Color and spectral Doppler evaluation also performed bilaterally  FINDINGS: TESTES: RIGHT testis = 4 4 x 2 0 x 2 6 cm  Volume 11 8 mL Normal contour with homogeneous smooth echotexture  No intratesticular mass lesion or calcifications  LEFT testis = 6 8 x 3 8 x 2 7 cm   Volume 36 7 mL Diffusely abnormal heterogeneity as seen on prior study with hypovascularity compared to the right  Several individual hypoechoic nodules can be measured the largest measuring 2 3 x 1 8 x 2 0 cm in the upper pole EPIDIDYMIDES: Normal Size  Doppler ultrasound demonstrates normal blood flow  No epididymal lesions  HYDROCELE:  Left hydrocele noted  VARICOCELE:  None present  SCROTUM:  Scrotal thickness and appearance within normal limits  No evidence for extratesticular mass or hernia demonstrated  Impression:  Left testicular nodules consistent with neoplasm with small hydrocele  Workstation performed: [id number]     CTA chest pe study    Result Date: 12/12/2022  Narrative: CTA - CHEST WITH IV CONTRAST - PULMONARY ANGIOGRAM INDICATION:   Pulmonary embolism (PE) suspected, neg D-dimer SOB, Tachycardia, R/o PE, and Evalaute for Metastasis  Retroperitoneal lymphadenopathy on abdomen CT from 12/10/2022  COMPARISON: CXR and abdomen CT 12/10/2022, chest CT 4/29/2013  TECHNIQUE: CT angiogram timed for optimal opacification of the pulmonary arteries  Axial, sagittal, and coronal 2D reformats created from source data  Coronal 3D MIP postprocessing on the acquisition scanner  Radiation dose length product (DLP):  471 mGy-cm   Radiation dose exposure minimized using iterative reconstruction and automated exposure control  IV Contrast:  100 mL of iohexol (OMNIPAQUE)  FINDINGS: PULMONARY ARTERIES:  No pulmonary embolus  LUNGS:  Lungs clear  AIRWAYS: No significant filling defects  PLEURA:  Unremarkable  HEART/GREAT VESSELS:  Normal for age  MEDIASTINUM AND NIMA:  Unremarkable  CHEST WALL AND LOWER NECK: Unremarkable  UPPER ABDOMEN:  Unremarkable  OSSEOUS STRUCTURES:  Old right clavicle fracture  Impression: No pulmonary embolus  No acute pulmonary disease  Nothing to indicate metastatic disease    Workstation performed: [id number]     VAS lower limb venous duplex study, complete bilateral    Result Date: 12/12/2022  Narrative:  [facility] REPORT CLINICAL: Indications: Patient c/o new swelling and discomfort in both legs  No history of DVT/PE recent surgery or trauma  CONCLUSION:  Impression: RIGHT LOWER LIMB: No evidence of acute or chronic deep vein thrombosis  No evidence of superficial thrombophlebitis noted  Doppler evaluation shows a normal response to augmentation maneuvers  Popliteal, posterior tibial and peroneal arterial Doppler waveforms are triphasic  LEFT LOWER LIMB: No evidence of acute or chronic deep vein thrombosis  No evidence of superficial thrombophlebitis noted  Doppler evaluation shows a normal response to augmentation maneuvers  Popliteal, posterior tibial and peroneal arterial Doppler waveforms are triphasic  SIGNATURE: Electronically Signed by: Vangie Rosas on 2022-12-12 11:25:51 AM    CT abdomen pelvis with contrast    Result Date: 12/10/2022  Narrative: CT ABDOMEN AND PELVIS WITH IV CONTRAST INDICATION:   LUQ abdominal pain fluid retention, L sided abd pain and LUQ mass  COMPARISON:  CT scan dated September 13, 2020  Testicular ultrasound exam dated June 18, 2021 TECHNIQUE:  CT examination of the abdomen and pelvis was performed  Axial, sagittal, and coronal 2D reformatted images were created from the source data and submitted for interpretation  Radiation dose length product (DLP) for this visit:  1449 8 mGy-cm   This examination, like all CT scans performed in the [facility], was performed utilizing techniques to minimize radiation dose exposure, including the use of iterative  reconstruction and automated exposure control  IV Contrast:  100 mL of iohexol (OMNIPAQUE) Enteric Contrast:  Enteric contrast was not administered  FINDINGS: ABDOMEN LOWER CHEST:  No clinically significant abnormality identified in the visualized lower chest  LIVER/BILIARY TREE:  Diffuse steatosis  GALLBLADDER:  No calcified gallstones  No pericholecystic inflammatory change   SPLEEN: Unremarkable  PANCREAS:  Unremarkable  ADRENAL GLANDS:  Unremarkable  KIDNEYS/URETERS:  Few low-attenuation lesions which are too small to characterize but statistically benign  STOMACH AND BOWEL:  Unremarkable  APPENDIX:  No findings to suggest appendicitis  ABDOMINOPELVIC CAVITY:  Abnormal-appearing retroperitoneal lymph nodes, with an index aortocaval lymph node measuring 2 2 x 2 2 cm  In index retrocaval lymph node measures 1 6 x 1 5 cm  There is an enlarged left external iliac lymph node which measures 2 x 2 cm (201, 63)  Nonspecific fat stranding on the right anterior to the common and external iliac artery which is nonspecific  VESSELS:  Unremarkable for patient's age  PELVIS REPRODUCTIVE ORGANS:  Unremarkable for patient's age  URINARY BLADDER:  Unremarkable  ABDOMINAL WALL/INGUINAL REGIONS:  Fat-containing umbilical hernia  OSSEOUS STRUCTURES:  No destructive skeletal lesions  Impression: Retroperitoneal lymphadenopathy which is concerning for a malignant process, specifically metastatic testicular cancer given the findings on prior scrotal ultrasound  Finding discussed with Dr Patricia Gutierrez at the time of this dictation  Hepatic steatosis  Nonspecific fat stranding along the right common and external iliac vessels, of uncertain etiology  Workstation performed: [id number]     Echo complete w/ contrast if indicated    Result Date: 12/12/2022  Narrative: •  Left Ventricle: Left ventricular cavity size is normal  Wall thickness is mildly increased  There is concentric remodeling  The left ventricular ejection fraction is 60%   Systolic function is normal  Wall motion is normal  Diastolic function is normal  •  Right Ventricle: Right ventricular cavity size is normal  Systolic function is normal      ECOG :0      Assessment and plan:  82-year-old  male who presented with gradual enlargement of the left testicle over a year and a half, recently had a CT scan of the abdomen and pelvis showed aortocaval lymphadenopathy measuring 2 2 cm, retrocaval lymphadenopathy measuring 1 6 cm and left iliac lymphadenopathy, status post radical orchiectomy on 12/14/2022 for stage I (T1b, NX, MX, 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

## 2023-01-04 ENCOUNTER — DOCUMENTATION (OUTPATIENT)
Dept: GYNECOLOGIC ONCOLOGY | Facility: CLINIC | Age: 38
End: 2023-01-04

## 2023-01-04 ENCOUNTER — OFFICE VISIT (OUTPATIENT)
Dept: UROLOGY | Facility: CLINIC | Age: 38
End: 2023-01-04

## 2023-01-04 ENCOUNTER — TELEPHONE (OUTPATIENT)
Dept: PSYCHIATRY | Facility: CLINIC | Age: 38
End: 2023-01-04

## 2023-01-04 VITALS
HEIGHT: 76 IN | WEIGHT: 306 LBS | SYSTOLIC BLOOD PRESSURE: 142 MMHG | BODY MASS INDEX: 37.26 KG/M2 | DIASTOLIC BLOOD PRESSURE: 100 MMHG

## 2023-01-04 DIAGNOSIS — R59.0 RETROPERITONEAL LYMPHADENOPATHY: Primary | ICD-10-CM

## 2023-01-04 DIAGNOSIS — N50.89 TESTICULAR MASS: ICD-10-CM

## 2023-01-04 NOTE — PROGRESS NOTES
Oncology Navigator Note: Multidisciplinary Urology Case Review 1/3/2023        Diagnosis: Kana Lo is a 30 y o  male who was presented at the Urology Oncology Multidisciplinary Tumor Conference today  Presented to ER in 6/2021 with palpable mass and pain of left testicle  Ultrasound showed heterogenous mass suspicious for malignancy and was referred to Urology but did show up for appt  Presented to ER in 12/2022 and CT Scan showed Retroperitoneal lymphadenopathy concerning for malignant process, specifically metastatic testicular cancer given the findings on prior scrotal ultrasound  12/24/2022 S/p left inguinal orchiectomy with pathology showing seminoma with no invasion  Serum markers neg pre-op  Recommendations of Group:  Medical onclogy for chemotherapy discussion    ADDENDUM:  Seen by Dr Brice Shepard 1/3/23 and IR biopsy of LN ordered  Plan for repeat HCG, AFP & LDH with CT scan a abd/pelvis in 6 to 8 weeks  Upcoming Appointments:    Future Appointments   Date Time Provider Rachael Gold   1/4/2023 11:00 AM Angelo Mackey MD [facility]   2/22/2023  8:30 AM [address], [place] [address] [facility]   3/1/2023  9:00 AM EA  [facility]   3/8/2023  9:20 AM Sebas Del Valle MD [facility]            Patient was discussed at the Multidisciplinary Urology Case review on 1/3/2023      NCCN guidelines were available for review  The final treatment plan will be left to the discretion of the patient and the treating physician  DISCLAIMERS:  TO THE TREATING PHYSICIAN:  This conference is a meeting of clinicians from various specialty areas who evaluate and discuss patients for whom a multidisciplinary treatment approach is being considered  Please note that the above opinion was a consensus of the conference attendees and is intended only to assist in quality care of the discussed patient    The responsibility for follow up on the input given during the conference, along with any final decisions regarding plan of care, is that of the patient and the patient's provider  Accordingly, appointments have only been recommended based on this information and have NOT been scheduled unless otherwise noted  TO THE PATIENT:  This summary is a brief record of major aspects of your cancer treatment  You may choose to share a copy with any of your doctors or nurses  However, this is not a detailed or comprehensive record of your care

## 2023-01-04 NOTE — PROGRESS NOTES
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  -Pure seminoma, 4 5 cm, negative lymphovascular invasion (T1b)  -    2  Retro peritoneal-lymphadenopathy  -Pending IR biopsy    Hemanth has done very well following his orchiectomy  At this point I cleared him to return to regular physical activity  I presented the patient under genitourinary tumor board and he has been evaluated yesterday by Dr Keerthi La of medical oncology  Recommendation was for a confirmatory IR biopsy of one of the retroperitoneal lymph nodes, as it is somewhat unusual to have tameka disease, even low volume in the setting of a pure seminoma with negative tumor markers  I agree with this plan  In terms of next steps and monitoring at this point will defer to medical oncology  I will not order any imaging or schedule follow-up with our group since the patient's oncologic plan is pendin the results of his biopsy  I will meet with our oncology nurse navigator to ensure he has follow-up with our team appropriately      Lisa Jacobo MD      Chief Complaint     Orchiectomy follow-up      History of Present Illness     Tara Roach is a 30 y o  returns in follow-up status post recent orchiectomy    Detailed Urologic History     - please refer to HPI    Review of Systems     Review of Systems   Constitutional: Negative for activity change and fatigue  HENT: Negative for congestion  Eyes: Negative for visual disturbance  Respiratory: Negative for shortness of breath and wheezing  Cardiovascular: Negative for chest pain and leg swelling  Gastrointestinal: Negative for abdominal pain  Endocrine: Negative for polyuria     Genitourinary: Negative for dysuria, flank pain, hematuria and urgency  Musculoskeletal: Negative for back pain  Allergic/Immunologic: Negative for immunocompromised state  Neurological: Negative for dizziness and numbness  Psychiatric/Behavioral: Negative for dysphoric mood  All other systems reviewed and are negative  AUA SYMPTOM SCORE    Flowsheet Row Most Recent Value   AUA SYMPTOM SCORE    How often have you had a sensation of not emptying your bladder completely after you finished urinating? 0 (P)     How often have you had to urinate again less than two hours after you finished urinating? 1 (P)     How often have you found you stopped and started again several times when you urinate? 0 (P)     How often have you found it difficult to postpone urination? 0 (P)     How often have you had a weak urinary stream? 0 (P)     How often have you had to push or strain to begin urination? 0 (P)     How many times did you most typically get up to urinate from the time you went to bed at night until the time you got up in the morning? 5 (P)     Quality of Life: If you were to spend the rest of your life with your urinary condition just the way it is now, how would you feel about that? 6 (P)     AUA SYMPTOM SCORE 6 (P)             Allergies     No Known Allergies    Physical Exam       Physical Exam  Constitutional:       General: He is not in acute distress  Appearance: He is well-developed  HENT:      Head: Normocephalic and atraumatic  Cardiovascular:      Comments: Negative lower extremity edema  Pulmonary:      Effort: Pulmonary effort is normal       Breath sounds: Normal breath sounds  Abdominal:      Palpations: Abdomen is soft  Musculoskeletal:         General: Normal range of motion  Cervical back: Normal range of motion  Skin:     General: Skin is warm  Neurological:      Mental Status: He is alert and oriented to person, place, and time     Psychiatric:         Behavior: Behavior normal            Vital Signs  Vitals:    01/04/23 1103   BP: 142/100   BP Location: Left arm   Patient Position: Sitting   Cuff Size: Adult   Weight: (!) 139 kg (306 lb)   Height: 6' 4" (1 93 m)         Current Medications       Current Outpatient Medications:   •  albuterol (ProAir HFA) 90 mcg/act inhaler, Inhale 2 puffs every 6 (six) hours as needed for wheezing, Disp: 8 5 g, Rfl: 0  •  amLODIPine (NORVASC) 10 mg tablet, Take 1 tablet (10 mg total) by mouth daily, Disp: 30 tablet, Rfl: 0  •  FLUoxetine (PROzac) 10 mg capsule, Take 1 capsule (10 mg total) by mouth daily, Disp: 30 capsule, Rfl: 2  •  losartan (COZAAR) 50 mg tablet, Take 1 tablet (50 mg total) by mouth daily, Disp: 30 tablet, Rfl: 0  •  QUEtiapine (SEROquel) 25 mg tablet, Take 1 tablet (25 mg total) by mouth daily at bedtime, Disp: 30 tablet, Rfl: 0  •  sodium chloride 1 g tablet, Take 2 tablets (2 g total) by mouth 3 (three) times a day with meals, Disp: 30 tablet, Rfl: 0  •  tamsulosin (FLOMAX) 0 4 mg, Take 1 capsule (0 4 mg total) by mouth daily with dinner, Disp: 30 capsule, Rfl: 0  •  torsemide (DEMADEX) 10 mg tablet, Take 1 tablet (10 mg total) by mouth daily, Disp: 30 tablet, Rfl: 0  •  docusate sodium (COLACE) 100 mg capsule, Take 1 capsule (100 mg total) by mouth 2 (two) times a day for 15 days, Disp: 30 capsule, Rfl: 0      Active Problems     Patient Active Problem List   Diagnosis   • Shortness of breath   • Umbilical hernia without obstruction and without gangrene   • Tobacco use   • Unexplained weight gain   • Retroperitoneal lymphadenopathy   • Hypertension   • Urinary hesitancy   • Hyponatremia   • Testicular mass   • Seminoma of left testis [facility])         Past Medical History     Past Medical History:   Diagnosis Date   • Anxiety    • Depression    • Psychiatric disorder     bipolar         Surgical History     Past Surgical History:   Procedure Laterality Date   • ORCHIECTOMY Left 12/14/2022    Procedure: ORCHIECTOMY INGUINAL;  Surgeon: Sariah Stoner MD;  Location: [facility];  Service: Urology         Family History     History reviewed  No pertinent family history  Social History     Social History     Social History     Tobacco Use   Smoking Status Every Day   • Packs/day: 1 00   • Types: Cigarettes   • Start date: 2008   • Last attempt to quit: 2022   • Years since quittin 0   Smokeless Tobacco Never         Pertinent Lab Values     Lab Results   Component Value Date    CREATININE 1 07 2022       No results found for: PSA    @RESULTRCNT(1H])@      Pertinent Imaging      - n/a    Portions of the record may have been created with voice recognition software  Occasional wrong word or "sound a like" substitutions may have occurred due to the inherent limitations of voice recognition software  Read the chart carefully and recognize, using context, where substitutions have occurred

## 2023-01-04 NOTE — TELEPHONE ENCOUNTER
Called patient regarding referral to be placed on proper wait list  LVM for patient to call intake dept ([phone]) back

## 2023-01-09 ENCOUNTER — PATIENT OUTREACH (OUTPATIENT)
Dept: HEMATOLOGY ONCOLOGY | Facility: CLINIC | Age: 38
End: 2023-01-09

## 2023-01-09 NOTE — PROGRESS NOTES
Biopsychosocial and Barriers Assessment    Cancer Diagnosis: Left side seminoma  Home/Cell Phone:[phone]  Emergency Contact: Kailey Suero  Marital Status: Girlfriend  Interpretation concerns, speaks another language, preferred language: English  Cultural concerns: none  Ability to read or write: Literate    Caregiver/Support: Girlfriend  Children: 1 with girlfriend, 2 other (15, 16 and 3)  Child/Elder care: Pt cares for his 1year old    Housing: renting  Home Setup: house- 2 story  Lives With: wife, sister and sister's boyfriend  Daily Living Activities: able to care for self  Durable Medical Equipment: None  Ambulation: Can take a few steps at a time    Preferred [facility]acy: [facility] co-pays with insurance: bleleman Zapata  High co-pays with medication coverage: No  No medication coverage: No    Primary Care Provider: Mallory Colon MD  Hx of Home Health Care: No  Hx of Short term rehab: No  Mental Health Hx: Pt reports having depression and bi-polar disorder  Substance Abuse Hx: smokes cigarettes  Employment: currently unable to work  [employer] Status/Location: No  Ability to pay bills: Wife's salary and sister  POA/LW/AD: No  Transportation Plan/Concerns: Pt drives      What do you know about your Cancer Diagnosis    What has your doctor told you about your cancer diagnosis: Pt states he was told his cancer was removed and is "not entirely certain" what is going on at this time "    What has your doctor told you about your cancer treatment: Pt will have another scan and states that nothing is determined other that than at this time  What specific concerns do you have about your diagnosis and treatment: Pt's main concern that he expressed was that he is unable to have an erection for 8 months  Have you been made aware of any hair loss associated with treatment: N/A    Additional Comments:    MSW made outreach to the pt after his appointment with Oncology  The pt was open and receptive of this call  He states that he had an issue with his testicle 8 months prior, went to the hospital and was told he had an infection  The pt states he was given an antibiotic and when it was completed, he was feeling no better  He began to put on weight and went to the ED a few months ago as he was having stomach issues  While in the hospital, the pt was informed that he had testicular cancer and he had his testicle removed  The pt described how frustrated he was as he felt he had cancer 8 months prior when he was told he had an infection  Since that time, he has not been able to have an erection  The pt verbalized feeling frustrated as "no doctor will explain why this is happening "  MSW asked the pt if he expressed his concerns to the Oncologist, to which he states he did and was informed his sex drive and ability to have an erection should return in 2 weeks  The pt feels doubtful of this  MSW encouraged him to speak with the Doctor further if that time frame comes and goes and he remains unable to have an erection  The pt states that he worries how this is impacting his significant other  MSW spoke about counseling and offered resources  Pt not interested at this time  Pt states he is unable to work due to his weight gain and issues with his knees  He states he has applied for disability and is awaiting a response  His significant other works, as does her sister and her boyfriend  He states that all bills are paid and they have food in the house  The pt did not express any financial concerns  The pt cares for their 1year old during the day  MSW provided contact information and encouraged the pt to call should his needs change  Pt did score a 10 on the DT, however he reports that is due to his inability to have an erection  MSW did reach out to Dr Sofía Thompson office to notify  Emotional support offered

## 2023-01-10 NOTE — TELEPHONE ENCOUNTER
Called patient regarding referral to be placed on proper wait list  LVM for patient to call intake dept ([phone]) back

## 2023-01-13 NOTE — PRE-PROCEDURE INSTRUCTIONS
Pre-procedure Instructions for Interventional Radiology  [address] [place] [address] Dr  [place] [address]  [facility] [phone]    You are scheduled for a/an Retroperitoneal Lymph node Biopsy  On Friday 1/20/23  Your tentative arrival time is AM   Short stay will notify you the day before your procedure with the exact arrival time and the location to arrive  To prepare for your procedure:  1  Please arrange for someone to drive you home after the procedure and stay with you until the next morning if you are instructed to do so  This is typically for patients receiving some type of sedative or anesthetic for the procedure  2  DO NOT EAT OR DRINK ANYTHING after midnight on the evening before your procedure including candy & gum   3  ONLY SIPS OF WATER with your medications are allowed on the morning of your procedure  4  TAKE ALL OF YOUR REGULAR MEDICATIONS THE MORNING OF YOUR PROCEDURE with sips of water! We may call you to stop some of your blood sugar, blood pressure and blood thinning medications depending on the procedure  Please take all of these medications unless we instruct you to stop them  5  If you have an allergy to x-ray dye, please contact Interventional Radiology for an x-ray dye preparation which usually consists of an oral steroid and Benadryl  The day of your procedure:  1  Bring a list of the medications you take at home  2  Bring medications you take for breathing problems (such as inhalers), medications for chest pain, or both  3  Bring a case for your glasses or contacts  4  Bring your insurance card and a form of photo ID   5  Please leave all valuables such as credit cards and jewelry at home  6  Report to the registration desk in the main lobby at the [facility]  Ask to be directed to [facility]    7  While your procedure is being performed, your family may wait in the Radiology Waiting Room on the 1st floor in Radiology  if they need to leave, they may provide a number to be called following the procedure  8  Be prepared to stay overnight just in case  Sometimes procedures will indicate the need for further observation or treatment  9  If you are scheduled for a follow-up visit with the Interventional Radiologist after your procedure, you will be called with a date and time      Special Instructions (Medications to stop taking before your procedure etc ):

## 2023-01-18 DIAGNOSIS — F31.9 BIPOLAR DISORDER WITH DEPRESSION (HCC): ICD-10-CM

## 2023-01-18 DIAGNOSIS — N50.89 TESTICULAR MASS: ICD-10-CM

## 2023-01-19 RX ORDER — TORSEMIDE 10 MG/1
10 TABLET ORAL DAILY
Qty: 30 TABLET | Refills: 0 | Status: SHIPPED | OUTPATIENT
Start: 2023-01-19

## 2023-01-19 RX ORDER — AMLODIPINE BESYLATE 10 MG/1
10 TABLET ORAL DAILY
Qty: 30 TABLET | Refills: 0 | Status: SHIPPED | OUTPATIENT
Start: 2023-01-19

## 2023-01-19 RX ORDER — SODIUM CHLORIDE 1000 MG
2 TABLET, SOLUBLE MISCELLANEOUS
Qty: 30 TABLET | Refills: 0 | Status: SHIPPED | OUTPATIENT
Start: 2023-01-19

## 2023-01-19 RX ORDER — QUETIAPINE FUMARATE 25 MG/1
25 TABLET, FILM COATED ORAL
Qty: 30 TABLET | Refills: 0 | Status: SHIPPED | OUTPATIENT
Start: 2023-01-19

## 2023-01-19 RX ORDER — FLUOXETINE 10 MG/1
10 CAPSULE ORAL DAILY
Qty: 30 CAPSULE | Refills: 0 | Status: SHIPPED | OUTPATIENT
Start: 2023-01-19

## 2023-01-19 RX ORDER — LOSARTAN POTASSIUM 50 MG/1
50 TABLET ORAL DAILY
Qty: 30 TABLET | Refills: 0 | Status: SHIPPED | OUTPATIENT
Start: 2023-01-19

## 2023-01-20 ENCOUNTER — HOSPITAL ENCOUNTER (OUTPATIENT)
Dept: RADIOLOGY | Facility: HOSPITAL | Age: 38
Discharge: HOME | End: 2023-01-20
Attending: RADIOLOGY

## 2023-01-20 VITALS
HEART RATE: 94 BPM | DIASTOLIC BLOOD PRESSURE: 87 MMHG | RESPIRATION RATE: 17 BRPM | SYSTOLIC BLOOD PRESSURE: 128 MMHG | OXYGEN SATURATION: 99 % | BODY MASS INDEX: 37.38 KG/M2 | HEIGHT: 76 IN | WEIGHT: 307 LBS | TEMPERATURE: 97.9 F

## 2023-01-20 DIAGNOSIS — R59.0 RETROPERITONEAL LYMPHADENOPATHY: ICD-10-CM

## 2023-01-20 RX ORDER — FENTANYL CITRATE 50 UG/ML
INJECTION, SOLUTION INTRAMUSCULAR; INTRAVENOUS AS NEEDED
Status: COMPLETED | OUTPATIENT
Start: 2023-01-20 | End: 2023-01-20

## 2023-01-20 RX ORDER — LIDOCAINE HYDROCHLORIDE 10 MG/ML
INJECTION, SOLUTION EPIDURAL; INFILTRATION; INTRACAUDAL; PERINEURAL AS NEEDED
Status: COMPLETED | OUTPATIENT
Start: 2023-01-20 | End: 2023-01-20

## 2023-01-20 RX ORDER — SODIUM CHLORIDE 9 MG/ML
75 INJECTION, SOLUTION INTRAVENOUS CONTINUOUS
Status: DISCONTINUED | OUTPATIENT
Start: 2023-01-20 | End: 2023-01-21 | Stop reason: HOSPADM

## 2023-01-20 RX ORDER — MIDAZOLAM HYDROCHLORIDE 2 MG/2ML
INJECTION, SOLUTION INTRAMUSCULAR; INTRAVENOUS AS NEEDED
Status: COMPLETED | OUTPATIENT
Start: 2023-01-20 | End: 2023-01-20

## 2023-01-20 RX ADMIN — LIDOCAINE HYDROCHLORIDE 10 ML: 10 INJECTION, SOLUTION EPIDURAL; INFILTRATION; INTRACAUDAL; PERINEURAL at 10:39

## 2023-01-20 RX ADMIN — FENTANYL CITRATE 50 MCG: 50 INJECTION INTRAMUSCULAR; INTRAVENOUS at 10:39

## 2023-01-20 RX ADMIN — FENTANYL CITRATE 25 MCG: 50 INJECTION INTRAMUSCULAR; INTRAVENOUS at 10:26

## 2023-01-20 RX ADMIN — MIDAZOLAM 1 MG: 1 INJECTION INTRAMUSCULAR; INTRAVENOUS at 10:39

## 2023-01-20 RX ADMIN — FENTANYL CITRATE 50 MCG: 50 INJECTION INTRAMUSCULAR; INTRAVENOUS at 10:19

## 2023-01-20 RX ADMIN — MIDAZOLAM 1 MG: 1 INJECTION INTRAMUSCULAR; INTRAVENOUS at 10:19

## 2023-01-20 RX ADMIN — MIDAZOLAM 0.5 MG: 1 INJECTION INTRAMUSCULAR; INTRAVENOUS at 10:26

## 2023-01-20 NOTE — INTERVAL H&P NOTE
The history and physical were reviewed, along with progress notes, and the patient was examined  There are no changes since it was written      /90 (BP Location: Right arm)   Pulse (!) 107   Temp 99 3 °F (37 4 °C) (Temporal)   Resp 18   Ht 6' 4" (1 93 m)   Wt (!) 139 kg (307 lb)   SpO2 97%   BMI 37 37 kg/m²        Kolby Taveras MD/January 20, 2023/10:05 AM

## 2023-01-20 NOTE — DISCHARGE INSTRUCTIONS
POST BIOPSY    Care after your procedure:    1  Limit your activities for 24 hours after your biopsy  2  No driving day of biopsy  3  Return to your normal diet  Small sips of flat soda will help with mild nausea  4  Remove band-aid or dressing 24 hours after procedure  Contact Interventional Radiology at [phone] Anastacio PATIENTS: Contact Interventional Radiology at [phone]) Alexia Williamson PATIENTS: Contact Interventional Radiology at [phone]) if:    1  Difficulty breathing, nausea or vomiting  2  Chills or fever above 101 degrees F      3  Pain at biopsy site not relieved by medication  4  Develop any redness, swelling, heat, unusual drainage, heavy bruising or bleeding from biopsy site  Moderate Sedation   WHAT YOU NEED TO KNOW:   Moderate sedation, or conscious sedation, is medicine used during procedures to help you feel relaxed and calm  You will be awake and able to follow directions without anxiety or pain  You will remember little to none of the procedure  You may feel tired, weak, or unsteady on your feet after you get sedation  You may also have trouble concentrating or short-term memory loss  These symptoms should go away in 24 hours or less  DISCHARGE INSTRUCTIONS:   Call 911 or have someone else call for any of the following: You have sudden trouble breathing  You cannot be woken  Seek care immediately if:   You have a severe headache or dizziness  Your heart is beating faster than usual   Contact your healthcare provider if:   You have a fever  You have nausea or are vomiting for more than 8 hours after the procedure  Your skin is itchy, swollen, or you have a rash  You have questions or concerns about your condition or care  Self-care:   Have someone stay with you for 24 hours  This person can drive you to errands and help you do things around the house  This person can also watch for problems        Rest and do quiet activities for 24 hours  Do not exercise, ride a bike, or play sports  Stand up slowly to prevent dizziness and falls  Take short walks around the house with another person  Slowly return to your usual activities the next day  Do not drive or use dangerous machines or tools for 24 hours  You may injure yourself or others  Examples include a lawnmower, saw, or drill  Do not return to work for 24 hours if you use dangerous machines or tools for work  Do not make important decisions for 24 hours  For example, do not sign important papers or invest money  Drink liquids as directed  Liquids help flush the sedation medicine out of your body  Ask how much liquid to drink each day and which liquids are best for you  Eat small, frequent meals to prevent nausea and vomiting  Start with clear liquids such as juice or broth  If you do not vomit after clear liquids, you can eat your usual foods  Do not drink alcohol or take medicines that make you drowsy  This includes medicines that help you sleep and anxiety medicines  Ask your healthcare provider if it is safe for you to take pain medicine  Follow up with your healthcare provider as directed: Write down your questions so you remember to ask them during your visits  © 2017 [facility] Information is for End User's use only and may not be sold, redistributed or otherwise used for commercial purposes  All illustrations and images included in CareNotes® are the copyrighted property of [employer]  or Ronnie Pelayo  The above information is an  only  It is not intended as medical advice for individual conditions or treatments  Talk to your doctor, nurse or pharmacist before following any medical regimen to see if it is safe and effective for you

## 2023-01-20 NOTE — SEDATION DOCUMENTATION
Lymph node biopsy completed by Dr Viki Calvillo without complications  Patient tolerated procedure well  Left lower back puncture site covered with a band-aid  Bedrest start time 1100  Report given to [facility]

## 2023-01-20 NOTE — BRIEF OP NOTE (RAD/CATH)
INTERVENTIONAL RADIOLOGY PROCEDURE NOTE    Date: 1/20/2023    Procedure:   Procedure Summary     Date: 01/20/23 Room / Location: [facility] CAT Scan    Anesthesia Start:  Anesthesia Stop:     Procedure: IR BIOPSY LYMPH NODE Diagnosis:       Retroperitoneal lymphadenopathy      (Suspected seminoma  Retroperitoneal lymphadenopathy )    Scheduled Providers: Lala Hutchinson MD Responsible Provider:     Anesthesia Type: Not recorded ASA Status: Not recorded          Preoperative diagnosis:   1  Retroperitoneal lymphadenopathy         Postoperative diagnosis: Same  Surgeon: Lala Hutchinson MD     Assistant: None  No qualified resident was available  Blood loss: Trace    Specimens: Multiple 18-gauge cores    Findings: Technically successful image guided percutaneous biopsy of small left retroperitoneal lymph node  Some of the specimen was sent for flow cytometry  Complications: None immediate      Anesthesia: conscious sedation

## 2023-01-23 LAB — SCAN RESULT: NORMAL

## 2023-02-01 ENCOUNTER — TELEPHONE (OUTPATIENT)
Dept: HEMATOLOGY ONCOLOGY | Facility: CLINIC | Age: 38
End: 2023-02-01

## 2023-02-01 ENCOUNTER — DOCUMENTATION (OUTPATIENT)
Dept: HEMATOLOGY ONCOLOGY | Facility: CLINIC | Age: 38
End: 2023-02-01

## 2023-02-01 NOTE — TELEPHONE ENCOUNTER
Pt returned my call, he is agreeable to the plan  Due to difficulty with physical ambulation, patient will have labs completed on same day of CT scan  Pt is aware someone will call him with a  new appointment for CT scan  Pt was appreciative of the call

## 2023-02-01 NOTE — PROGRESS NOTES
Lymph node bx was inconclusive  Per Dr Yanni Saleh pt needs labs and CT scan moved up  Med Onc office working on this  At this time patient does not need sperm banking  I will wait for further instructions by office nurses

## 2023-02-01 NOTE — TELEPHONE ENCOUNTER
Reviewed biopsy results and plan with Dr Tete Quiles  Per Dr Tete Quiles, biopsy is inconclusive, patient should have labs done now and move up CT scan  VM left asking for a call back to discuss  My direct call back was provided  Gilbert Perry, can you please move up CT scan to be completed this month

## 2023-02-09 ENCOUNTER — TELEPHONE (OUTPATIENT)
Dept: NEPHROLOGY | Facility: CLINIC | Age: 38
End: 2023-02-09

## 2023-02-09 ENCOUNTER — PATIENT OUTREACH (OUTPATIENT)
Dept: HEMATOLOGY ONCOLOGY | Facility: CLINIC | Age: 38
End: 2023-02-09

## 2023-02-09 DIAGNOSIS — N50.89 TESTICULAR MASS: ICD-10-CM

## 2023-02-09 DIAGNOSIS — C62.92 SEMINOMA OF LEFT TESTIS (HCC): ICD-10-CM

## 2023-02-09 DIAGNOSIS — R59.0 RETROPERITONEAL LYMPHADENOPATHY: Primary | ICD-10-CM

## 2023-02-09 NOTE — PROGRESS NOTES
CT abdomen pelvis was rescheduled for 2/15/23 @1:30 at [address]  Call patient and he is aware of day/time and nothing to eat or drink except clear liquids 3 hours prior to scan

## 2023-02-09 NOTE — PROGRESS NOTES
Outreach made to patient  He did not show for his CT abdomen pelvis on 2/8/23  I called patient and let him know this needs to be scheduled  I let him know I will call to r/s and make him aware  He is aware to make sure he gets blood work completed as well  I will call patient back when I have a new CT scheduled for him  He voiced understanding

## 2023-02-13 ENCOUNTER — TELEPHONE (OUTPATIENT)
Dept: NEPHROLOGY | Facility: CLINIC | Age: 38
End: 2023-02-13

## 2023-02-13 DIAGNOSIS — I10 HYPERTENSION, UNSPECIFIED TYPE: Primary | ICD-10-CM

## 2023-02-13 DIAGNOSIS — E87.1 HYPONATREMIA: ICD-10-CM

## 2023-02-15 ENCOUNTER — TELEPHONE (OUTPATIENT)
Dept: NEPHROLOGY | Facility: CLINIC | Age: 38
End: 2023-02-15

## 2023-02-15 ENCOUNTER — HOSPITAL ENCOUNTER (OUTPATIENT)
Dept: RADIOLOGY | Facility: HOSPITAL | Age: 38
Discharge: HOME | End: 2023-02-15
Attending: INTERNAL MEDICINE

## 2023-02-15 DIAGNOSIS — R59.0 RETROPERITONEAL LYMPHADENOPATHY: ICD-10-CM

## 2023-02-15 DIAGNOSIS — C62.92 SEMINOMA OF LEFT TESTIS (HCC): ICD-10-CM

## 2023-02-15 DIAGNOSIS — N50.89 TESTICULAR MASS: ICD-10-CM

## 2023-02-15 RX ADMIN — IOHEXOL 99 ML: 350 INJECTION, SOLUTION INTRAVENOUS at 14:13

## 2023-02-15 NOTE — TELEPHONE ENCOUNTER
Call from patient confirming his appointment and confirming the message he received to have labs done before appointment  [facility] lab in [place] given to patient

## 2023-02-16 ENCOUNTER — APPOINTMENT (OUTPATIENT)
Dept: LAB | Facility: CLINIC | Age: 38
End: 2023-02-16

## 2023-02-16 DIAGNOSIS — C62.92 SEMINOMA OF LEFT TESTIS (HCC): ICD-10-CM

## 2023-02-16 DIAGNOSIS — N50.89 TESTICULAR MASS: ICD-10-CM

## 2023-02-16 DIAGNOSIS — R59.0 RETROPERITONEAL LYMPHADENOPATHY: ICD-10-CM

## 2023-02-16 LAB
AFP-TM SERPL-MCNC: 1.6 NG/ML (ref 0.5–8)
ALBUMIN SERPL BCP-MCNC: 3.8 G/DL (ref 3.5–5)
ALP SERPL-CCNC: 114 U/L (ref 46–116)
ALT SERPL W P-5'-P-CCNC: 54 U/L (ref 12–78)
ANION GAP SERPL CALCULATED.3IONS-SCNC: 7 MMOL/L (ref 4–13)
AST SERPL W P-5'-P-CCNC: 24 U/L (ref 5–45)
BASOPHILS # BLD AUTO: 0.1 THOUSANDS/ÂΜL (ref 0–0.1)
BASOPHILS NFR BLD AUTO: 1 % (ref 0–1)
BILIRUB SERPL-MCNC: 0.42 MG/DL (ref 0.2–1)
BUN SERPL-MCNC: 13 MG/DL (ref 5–25)
CALCIUM SERPL-MCNC: 9.5 MG/DL (ref 8.3–10.1)
CHLORIDE SERPL-SCNC: 103 MMOL/L (ref 96–108)
CO2 SERPL-SCNC: 26 MMOL/L (ref 21–32)
CREAT SERPL-MCNC: 0.85 MG/DL (ref 0.6–1.3)
EOSINOPHIL # BLD AUTO: 0.12 THOUSAND/ÂΜL (ref 0–0.61)
EOSINOPHIL NFR BLD AUTO: 1 % (ref 0–6)
ERYTHROCYTE [DISTWIDTH] IN BLOOD BY AUTOMATED COUNT: 13.5 % (ref 11.6–15.1)
GFR SERPL CREATININE-BSD FRML MDRD: 112 ML/MIN/1.73SQ M
GLUCOSE SERPL-MCNC: 106 MG/DL (ref 65–140)
HCG-TM SERPL-SCNC: <2 MLU/ML
HCT VFR BLD AUTO: 42 % (ref 36.5–49.3)
HGB BLD-MCNC: 14.2 G/DL (ref 12–17)
IMM GRANULOCYTES # BLD AUTO: 0.14 THOUSAND/UL (ref 0–0.2)
IMM GRANULOCYTES NFR BLD AUTO: 1 % (ref 0–2)
LDH SERPL-CCNC: 250 U/L (ref 81–234)
LYMPHOCYTES # BLD AUTO: 2.36 THOUSANDS/ÂΜL (ref 0.6–4.47)
LYMPHOCYTES NFR BLD AUTO: 19 % (ref 14–44)
MCH RBC QN AUTO: 33.3 PG (ref 26.8–34.3)
MCHC RBC AUTO-ENTMCNC: 33.8 G/DL (ref 31.4–37.4)
MCV RBC AUTO: 99 FL (ref 82–98)
MONOCYTES # BLD AUTO: 0.85 THOUSAND/ÂΜL (ref 0.17–1.22)
MONOCYTES NFR BLD AUTO: 7 % (ref 4–12)
NEUTROPHILS # BLD AUTO: 8.77 THOUSANDS/ÂΜL (ref 1.85–7.62)
NEUTS SEG NFR BLD AUTO: 71 % (ref 43–75)
NRBC BLD AUTO-RTO: 0 /100 WBCS
PLATELET # BLD AUTO: 322 THOUSANDS/UL (ref 149–390)
PMV BLD AUTO: 9.8 FL (ref 8.9–12.7)
POTASSIUM SERPL-SCNC: 3.5 MMOL/L (ref 3.5–5.3)
PROT SERPL-MCNC: 7.2 G/DL (ref 6.4–8.4)
RBC # BLD AUTO: 4.26 MILLION/UL (ref 3.88–5.62)
SODIUM SERPL-SCNC: 136 MMOL/L (ref 135–147)
WBC # BLD AUTO: 12.34 THOUSAND/UL (ref 4.31–10.16)

## 2023-02-17 ENCOUNTER — TELEPHONE (OUTPATIENT)
Dept: UROLOGY | Facility: AMBULATORY SURGERY CENTER | Age: 38
End: 2023-02-17

## 2023-02-17 NOTE — TELEPHONE ENCOUNTER
Pt called an had the wrong location   He was looking for the person who ordered his CT scan which was Dr Terell Ching    No further action

## 2023-02-20 DIAGNOSIS — N50.89 TESTICULAR MASS: ICD-10-CM

## 2023-02-20 RX ORDER — SODIUM CHLORIDE 1000 MG
2 TABLET, SOLUBLE MISCELLANEOUS
Qty: 30 TABLET | Refills: 0 | Status: SHIPPED | OUTPATIENT
Start: 2023-02-20 | End: 2023-02-22 | Stop reason: SDUPTHER

## 2023-02-22 ENCOUNTER — OFFICE VISIT (OUTPATIENT)
Dept: NEPHROLOGY | Facility: CLINIC | Age: 38
End: 2023-02-22

## 2023-02-22 VITALS
SYSTOLIC BLOOD PRESSURE: 130 MMHG | OXYGEN SATURATION: 97 % | DIASTOLIC BLOOD PRESSURE: 78 MMHG | HEIGHT: 76 IN | HEART RATE: 93 BPM | BODY MASS INDEX: 38.24 KG/M2 | WEIGHT: 314 LBS

## 2023-02-22 DIAGNOSIS — F31.9 BIPOLAR DISORDER WITH DEPRESSION (HCC): ICD-10-CM

## 2023-02-22 DIAGNOSIS — N50.89 TESTICULAR MASS: ICD-10-CM

## 2023-02-22 DIAGNOSIS — R59.0 RETROPERITONEAL LYMPHADENOPATHY: ICD-10-CM

## 2023-02-22 DIAGNOSIS — I10 PRIMARY HYPERTENSION: ICD-10-CM

## 2023-02-22 DIAGNOSIS — C62.92 SEMINOMA OF LEFT TESTIS (HCC): ICD-10-CM

## 2023-02-22 DIAGNOSIS — H53.2 DOUBLE VISION: ICD-10-CM

## 2023-02-22 DIAGNOSIS — E87.1 HYPONATREMIA: Primary | ICD-10-CM

## 2023-02-22 RX ORDER — FLUOXETINE 10 MG/1
10 CAPSULE ORAL DAILY
Qty: 30 CAPSULE | Refills: 0 | Status: SHIPPED | OUTPATIENT
Start: 2023-02-22

## 2023-02-22 RX ORDER — LOSARTAN POTASSIUM 50 MG/1
50 TABLET ORAL DAILY
Qty: 30 TABLET | Refills: 0 | Status: SHIPPED | OUTPATIENT
Start: 2023-02-22 | End: 2023-02-22 | Stop reason: SDUPTHER

## 2023-02-22 RX ORDER — AMLODIPINE BESYLATE 10 MG/1
10 TABLET ORAL DAILY
Qty: 30 TABLET | Refills: 0 | Status: SHIPPED | OUTPATIENT
Start: 2023-02-22 | End: 2023-02-22 | Stop reason: SDUPTHER

## 2023-02-22 RX ORDER — QUETIAPINE FUMARATE 25 MG/1
25 TABLET, FILM COATED ORAL
Qty: 30 TABLET | Refills: 0 | Status: SHIPPED | OUTPATIENT
Start: 2023-02-22

## 2023-02-22 RX ORDER — SODIUM CHLORIDE 1000 MG
2 TABLET, SOLUBLE MISCELLANEOUS
Qty: 30 TABLET | Refills: 0 | Status: SHIPPED | OUTPATIENT
Start: 2023-02-22

## 2023-02-22 RX ORDER — TORSEMIDE 10 MG/1
10 TABLET ORAL DAILY
Qty: 30 TABLET | Refills: 0 | Status: SHIPPED | OUTPATIENT
Start: 2023-02-22 | End: 2023-02-22 | Stop reason: SDUPTHER

## 2023-02-22 NOTE — PATIENT INSTRUCTIONS
Thank you for coming to your visit today  As we discussed you kidney function is normal  Your sodium is back to normal  Please follow the recommendations below       Recommend low sodium (salt) food    Avoid nonsteroidal anti-inflammatory drugs such as Naprosyn, ibuprofen, Aleve, Advil, Celebrex, Meloxicam (Mobic) etc   You can use Tylenol as needed if you do not have any liver condition to be concerned about    Try to avoid medications such as pantoprazole or  Protonix/Nexium or Esomeprazole)/Prilosec or omeprazole/Prevacid or lansoprazole/AcipHex or Rabeprazole  If you are able to, use Pepcid as this is safer for your kidneys      Try to exercise at least 30 minutes 3 days a week to begin with with an ultimate goal of 5 days a week for at least 30 minutes  Reduce sodium chloride to 1 gr 3 times a day   Please get blood work the 2nd week of March   Next Visit in 3 months with results   If you need to see us earlier we can change the appointment for you      Estuardo Nelson MD  Nephrology Attending       Estuardo Nelson MD  Nephrology Attending

## 2023-02-22 NOTE — PROGRESS NOTES
NEPHROLOGY OUTPATIENT PROGRESS NOTE   Harvie Rinne 30 y o  male MRN: [id number]  DATE: 2/25/2023    Reason for visit:   Chief Complaint   Patient presents with   • Follow-up   • Hospital Follow-up        Patient Instructions   Thank you for coming to your visit today  As we discussed you kidney function is normal  Your sodium is back to normal  Please follow the recommendations below       • Recommend low sodium (salt) food    • Avoid nonsteroidal anti-inflammatory drugs such as Naprosyn, ibuprofen, Aleve, Advil, Celebrex, Meloxicam (Mobic) etc   You can use Tylenol as needed if you do not have any liver condition to be concerned about    • Try to avoid medications such as pantoprazole or  Protonix/Nexium or Esomeprazole)/Prilosec or omeprazole/Prevacid or lansoprazole/AcipHex or Rabeprazole  If you are able to, use Pepcid as this is safer for your kidneys  • Try to exercise at least 30 minutes 3 days a week to begin with with an ultimate goal of 5 days a week for at least 30 minutes  • Reduce sodium chloride to 1 gr 3 times a day   • Please get blood work the 2nd week of March   Next Visit in 3 months with results   If you need to see us earlier we can change the appointment for you      Lissy Meredith MD  Nephrology Attending       Lissy Meredith MD  Nephrology Attending           Ladan Doyle was seen today for follow-up and hospital follow-up  Diagnoses and all orders for this visit:    Hyponatremia  -     Basic metabolic panel; Future  -     torsemide (DEMADEX) 10 mg tablet; Take 1 tablet (10 mg total) by mouth daily    Testicular mass  -     Discontinue: amLODIPine (NORVASC) 10 mg tablet; Take 1 tablet (10 mg total) by mouth daily  -     Discontinue: losartan (COZAAR) 50 mg tablet; Take 1 tablet (50 mg total) by mouth daily  -     Discontinue: torsemide (DEMADEX) 10 mg tablet;  Take 1 tablet (10 mg total) by mouth daily    Double vision  -     Ambulatory Referral to Ophthalmology; Future    Primary hypertension  -     amLODIPine (NORVASC) 10 mg tablet; Take 1 tablet (10 mg total) by mouth daily  -     losartan (COZAAR) 50 mg tablet; Take 1 tablet (50 mg total) by mouth daily    Retroperitoneal lymphadenopathy    Seminoma of left testis [facility])        Assessment/Plan:   30 y o  male with PMH HTN, hyponatremia, left testicular mass with  Patient presented to the ER in 6/2021 with palpable mass and pain of left testicle, found to have retroperitoneal lymphadenopathy concerning for malignant process, s/p biopsy that showed  seminoma with no invasion  Patient admitted in December 2249 with SOB complicated with hyponatremia     PLAN:     #Hyposomolar Hyponatremia   · Sodium on admission:129  • Serum osm: 275  • Urine osm: 729 (elevated) reflects presence of ADH  • Urine Sodium: 73  • Etiology: Likely secondary to SIADH in the settings of malignancy   • Plan:  • Continue Torsemide 10mg   • Reduce sodium chloride to 1gr tid   • Repeat BMP second week of March       #Volume status/hypertension:  • Volume:hypervolemic with abdominal distention   • Blood pressure:normotensive /70mmHg, goal <140/90  • Recommend:  • Torsemide as above    #Seminoma   · status post left orchiectomy  · abdominal distention with lymphadenopathy  · F/u with oncology and urology    #obesity   · BMI 38 22  · Recommend weight loss, can be complicated at this time due to active disease    #Double vision   · New, started 1 month ago   · Had CT head  last July that was normal  · No recent CT after smeinoma diagnosis  · Referred to ophthalmology     #Abdominal distention/lymphadenopathy  · CT with lymph nodes , mets? · F/u with hem/onc     SUBJECTIVE / INTERVAL HISTORY:  30 y o  male presents in follow up of hyponatremia  Patient denies SOB, no CP  Complains of double vision for the last month  Patient is taking sodium chloride and Torsemide, no dizziness   He is concerned about diagnosis and abdominal distention     Hemanth BRIONES Kiki denies any recent illness/hospitalizations/medication changes since last office visit  Review of Systems   Constitutional: Negative for activity change and appetite change  HENT: Negative for congestion and dental problem  Eyes: Negative for discharge  Respiratory: Negative for shortness of breath and stridor  Cardiovascular: Positive for leg swelling  Gastrointestinal: Positive for abdominal distention and abdominal pain  Endocrine: Negative for cold intolerance and heat intolerance  Genitourinary: Negative for difficulty urinating and dysuria  Musculoskeletal: Negative for arthralgias, back pain and gait problem  Skin: Negative for color change, pallor and rash  Neurological: Negative for dizziness  Psychiatric/Behavioral: Negative for agitation  OBJECTIVE:  /78 (BP Location: Left arm, Patient Position: Sitting, Cuff Size: Adult)   Pulse 93   Ht 6' 4" (1 93 m)   Wt (!) 142 kg (314 lb)   SpO2 97%   BMI 38 22 kg/m²  Body mass index is 38 22 kg/m²      Physical exam:  Physical Exam  General:  Obese, no acute distress at this time  Skin:  No acute rash  Eyes:  No scleral icterus and noninjected  ENT:  mucous membranes moist  Neck:  no carotid bruits  Chest:  Clear to auscultation percussion, good respiratory effort, no use of accessory respiratory muscles  CVS:  Regular rate and rhythm without rub  Abdomen:  Distended , hard    Extremities:   lower extremity edema  Neuro:  No gross focality  Psych:  Alert , cooperative     Medications:    Current Outpatient Medications:   •  albuterol (ProAir HFA) 90 mcg/act inhaler, Inhale 2 puffs every 6 (six) hours as needed for wheezing, Disp: 8 5 g, Rfl: 0  •  amLODIPine (NORVASC) 10 mg tablet, Take 1 tablet (10 mg total) by mouth daily, Disp: 90 tablet, Rfl: 1  •  docusate sodium (COLACE) 100 mg capsule, Take 1 capsule (100 mg total) by mouth 2 (two) times a day for 15 days, Disp: 30 capsule, Rfl: 0  •  losartan (COZAAR) 50 mg tablet, Take 1 tablet (50 mg total) by mouth daily, Disp: 90 tablet, Rfl: 1  •  tamsulosin (FLOMAX) 0 4 mg, Take 1 capsule (0 4 mg total) by mouth daily with dinner, Disp: 30 capsule, Rfl: 0  •  torsemide (DEMADEX) 10 mg tablet, Take 1 tablet (10 mg total) by mouth daily, Disp: 90 tablet, Rfl: 1  •  amLODIPine (NORVASC) 10 mg tablet, Take 1 tablet (10 mg total) by mouth daily, Disp: 30 tablet, Rfl: 0  •  FLUoxetine (PROzac) 10 mg capsule, Take 1 capsule (10 mg total) by mouth daily, Disp: 30 capsule, Rfl: 0  •  losartan (COZAAR) 50 mg tablet, Take 1 tablet (50 mg total) by mouth daily, Disp: 30 tablet, Rfl: 0  •  QUEtiapine (SEROquel) 25 mg tablet, Take 1 tablet (25 mg total) by mouth daily at bedtime, Disp: 30 tablet, Rfl: 0  •  sodium chloride 1 g tablet, Take 2 tablets (2 g total) by mouth 3 (three) times a day with meals, Disp: 30 tablet, Rfl: 0  •  torsemide (DEMADEX) 10 mg tablet, Take 1 tablet (10 mg total) by mouth daily, Disp: 30 tablet, Rfl: 0    Allergies: Allergies as of 02/22/2023   • (No Known Allergies)       The following portions of the patient's history were reviewed and updated as appropriate: past family history, past surgical history and problem list     Laboratory Results:  Lab Results   Component Value Date    SODIUM 136 02/16/2023    K 3 5 02/16/2023     02/16/2023    CO2 26 02/16/2023    BUN 13 02/16/2023    CREATININE 0 85 02/16/2023    GLUC 106 02/16/2023    CALCIUM 9 5 02/16/2023        Lab Results   Component Value Date    CALCIUM 9 5 02/16/2023    PHOS 3 3 12/10/2022       Portions of the record may have been created with voice recognition software  Occasional wrong word or "sound a like" substitutions may have occurred due to the inherent limitations of voice recognition software  Read the chart carefully and recognize, using context, where substitutions have occurred

## 2023-02-23 RX ORDER — TORSEMIDE 10 MG/1
10 TABLET ORAL DAILY
Qty: 30 TABLET | Refills: 0 | Status: SHIPPED | OUTPATIENT
Start: 2023-02-23

## 2023-02-23 RX ORDER — AMLODIPINE BESYLATE 10 MG/1
10 TABLET ORAL DAILY
Qty: 30 TABLET | Refills: 0 | Status: SHIPPED | OUTPATIENT
Start: 2023-02-23

## 2023-02-23 RX ORDER — LOSARTAN POTASSIUM 50 MG/1
50 TABLET ORAL DAILY
Qty: 30 TABLET | Refills: 0 | Status: SHIPPED | OUTPATIENT
Start: 2023-02-23

## 2023-02-25 RX ORDER — TORSEMIDE 10 MG/1
10 TABLET ORAL DAILY
Qty: 90 TABLET | Refills: 1 | Status: SHIPPED | OUTPATIENT
Start: 2023-02-25

## 2023-02-25 RX ORDER — AMLODIPINE BESYLATE 10 MG/1
10 TABLET ORAL DAILY
Qty: 90 TABLET | Refills: 1 | Status: SHIPPED | OUTPATIENT
Start: 2023-02-25

## 2023-02-25 RX ORDER — LOSARTAN POTASSIUM 50 MG/1
50 TABLET ORAL DAILY
Qty: 90 TABLET | Refills: 1 | Status: SHIPPED | OUTPATIENT
Start: 2023-02-25

## 2023-03-08 ENCOUNTER — OFFICE VISIT (OUTPATIENT)
Dept: HEMATOLOGY ONCOLOGY | Facility: CLINIC | Age: 38
End: 2023-03-08

## 2023-03-08 ENCOUNTER — TELEPHONE (OUTPATIENT)
Dept: HEMATOLOGY ONCOLOGY | Facility: CLINIC | Age: 38
End: 2023-03-08

## 2023-03-08 VITALS
OXYGEN SATURATION: 95 % | SYSTOLIC BLOOD PRESSURE: 122 MMHG | DIASTOLIC BLOOD PRESSURE: 84 MMHG | WEIGHT: 315 LBS | TEMPERATURE: 98.4 F | HEIGHT: 76 IN | BODY MASS INDEX: 38.36 KG/M2 | HEART RATE: 84 BPM

## 2023-03-08 DIAGNOSIS — D70.1 CHEMOTHERAPY INDUCED NEUTROPENIA (HCC): ICD-10-CM

## 2023-03-08 DIAGNOSIS — D70.1 CHEMOTHERAPY INDUCED NEUTROPENIA (HCC): Primary | ICD-10-CM

## 2023-03-08 DIAGNOSIS — T45.1X5A CHEMOTHERAPY INDUCED NEUTROPENIA (HCC): Primary | ICD-10-CM

## 2023-03-08 DIAGNOSIS — Z95.828 PORT-A-CATH IN PLACE: ICD-10-CM

## 2023-03-08 DIAGNOSIS — T45.1X5A CHEMOTHERAPY-INDUCED NAUSEA: Primary | ICD-10-CM

## 2023-03-08 DIAGNOSIS — C62.92 SEMINOMA OF LEFT TESTIS (HCC): ICD-10-CM

## 2023-03-08 DIAGNOSIS — R11.0 CHEMOTHERAPY-INDUCED NAUSEA: Primary | ICD-10-CM

## 2023-03-08 DIAGNOSIS — C62.92 SEMINOMA OF LEFT TESTIS (HCC): Primary | ICD-10-CM

## 2023-03-08 DIAGNOSIS — T45.1X5A CHEMOTHERAPY INDUCED NEUTROPENIA (HCC): ICD-10-CM

## 2023-03-08 RX ORDER — ONDANSETRON 4 MG/1
4 TABLET, ORALLY DISINTEGRATING ORAL EVERY 6 HOURS PRN
Qty: 20 TABLET | Refills: 1 | Status: SHIPPED | OUTPATIENT
Start: 2023-03-08

## 2023-03-08 RX ORDER — LIDOCAINE AND PRILOCAINE 25; 25 MG/G; MG/G
CREAM TOPICAL
Qty: 30 G | Refills: 2 | Status: SHIPPED | OUTPATIENT
Start: 2023-03-08

## 2023-03-08 NOTE — PROGRESS NOTES
Hematology Outpatient Follow - Up Note  Rockford Krabbe 30 y o  male MRN: @ Encounter: [id number]        Date:  3/8/2023        Assessment/ Plan:    51-year-old  male with left testicular seminoma CAT scan showed aortocaval lymphadenopathy measuring 2 2 cm retrocaval lymphadenopathy measuring 1 6 cm and left iliac lymphadenopathy status post right orchiectomy on 12/2022 stage I (T1b, NX, MX, S0) pure seminoma unifocal primary 4 5 cm and negative margins no lymphovascular invasion, lymphadenopathy in the retroperitoneal area repeat CAT scan in February 2023 showed stable however enlarged lymph node    This is stage II    Now with mild elevation of LDH which is more consistent with metastatic and aggressiveness of the disease    Repeat biopsy of the lymph node showed no evidence of viable tumor however a lot of necrosis with could be seen in seminoma    Patient to be treated with ATP x4 cycles etoposide 100 mg meter square, cisplatin 20 mg per metered square 5 days every 21 days cycle side effects such as alopecia, nausea, vomiting, hearing loss, dehydration, hepatic and renal insufficiency etc  told he agreed to proceed    To prevent chemotherapy-induced neutropenia pegfilgrastim is indicated    We will ask IR for Port-A-Cath placement        Labs and imaging studies are reviewed by ordering provider once results are available  If there are findings that need immediate attention, you will be contacted when results available  Discussing results and the implication on your healthcare is best discussed in person at your follow-up visit         HPI:    51-year-old  male with history of hypertension, morbid obesity, periumbilical hernia, fatty liver, fluid retention, who felt a mass in the left testicle, on 6/18/2021 he was seen in the emergency room, ultrasound showed heterogeneous mass suspicious for malignancy referred for urology however he did not show up     According to the patient this mass has been growing up slowly, requesting evaluation by urology on 12/2022, CT scan of the abdomen and pelvis showed abnormal appearing retroperitoneal lymph nodes for example aortocaval lymph node measuring 2 2 x 2 2 cm, retrocaval lymph node measuring 1 6 x 1 5 cm, enlarged left iliac lymph node measuring 2 x 2 centimeter, fat stranding on the right anterior common/external iliac artery     Ultrasound of the scrotum showed left testicular nodules consistent with neoplasm and a small hydrocele the largest measuring 2 3 x 1 8 x 2 cm in the upper pole     Alpha-fetoprotein, hCG, LDH were normal before the surgery     Status post radical left orchiectomy on 12/14/2022         Final Diagnosis   A  Left testicle and spermatic cord, radical orchiectomy:  -Seminoma, 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   -Largest viable tumor focus measures 0 9 cm in greatest dimension    -All margins negative for carcinoma   -Definitive lymph-vascular invasion not identified, see note  -Extensive necrosis and fibrosis present  -SEE SYNOPTIC REPORT     Comment: Tumor cells are positive for OCT3/4 and  and negative for CD45, CK-AE1/AE3, CD30, glypican-3, AFP, Beta-HCG, and inhibin   Electronically signed by Razia Witt DO on 12/21/2022 at  3:04 PM   Note     Small focus of tumor is present within lymphvascular space without surrounding fibrin or clot   This is favored to represent carryover            CLINICAL   Pre-Orchiectomy Serum Tumor Markers   Serum marker studies within normal limits    Post-Orchiectomy Serum Tumor Markers   Unknown    Serum Tumor Markers (S)   S0 (serum marker study levels within normal limits)    SPECIMEN   Specimen Laterality   Left    TUMOR   Tumor Focality   Unifocal    Tumor Size   Greatest dimension of main tumor mass (Centimeters): 4 5 cm   Histologic Type   Seminoma    Tumor Extent   Limited to testis    Lymphovascular Invasion   Not identified    MARGINS   Margin Status   All margins negative for tumor    REGIONAL LYMPH NODES   Regional Lymph Node Status   Not applicable (no regional lymph nodes submitted or found)       Stage I (pT1b, PN X, MX, S 0)     He smokes 1 pack of cigarette daily for many years, he has 3 children, his father  with lung cancer     He reported severe acid reflux with tenderness in the epigastrium area denied any headache blurred vision chest pain he reported dyspnea on exertion abdominal pain denied any dysuria hematuria melena hematochezia or change in bowel habits     He does not drink alcohol  Repeat CT scan of the abdomen and pelvis on 2/15/2023 showed multiple enlarged para-aortic/aortocaval lymphadenopathy the largest 2 3 x 1 8 cm did not change compared to the previous CAT scan    LDH increasing to 250 (199 on 2022    Normal hCG and alpha-fetoprotein        Interval History:        Previous Treatment:         Test Results:    Imaging: CT abdomen pelvis w contrast    Result Date: 2023  Narrative: CT ABDOMEN AND PELVIS WITH IV CONTRAST INDICATION:   R59 0: Localized enlarged lymph nodes C62 92: Malignant neoplasm of left testis, unspecified whether descended or undescended N50 89: Other specified disorders of the male genital organs  COMPARISON:  12/10/2022  TECHNIQUE:  CT examination of the abdomen and pelvis was performed  Axial, sagittal, and coronal 2D reformatted images were created from the source data and submitted for interpretation  Radiation dose length product (DLP) for this visit:  536 7 mGy-cm   This examination, like all CT scans performed in the [facility], was performed utilizing techniques to minimize radiation dose exposure, including the use of iterative reconstruction and automated exposure control  IV Contrast:  99 mL of iohexol (OMNIPAQUE) Enteric Contrast:  Enteric contrast was not administered   FINDINGS: ABDOMEN LOWER CHEST:  No clinically significant abnormality identified in the visualized lower chest  LIVER/BILIARY TREE:  Liver is normal in size and contour without focal enhancing mass  There is mild diffuse hepatic steatosis  GALLBLADDER:  No calcified gallstones  No pericholecystic inflammatory change  SPLEEN:  Unremarkable  PANCREAS:  Unremarkable  ADRENAL GLANDS:  Unremarkable  KIDNEYS/URETERS:  Kidneys are normal in size and position  A too small to characterize hypodensity in the upper pole of the left kidney is again seen, statistically likely a tiny cyst   No suspicious enhancing renal mass, nephrolithiasis, hydronephrosis, or perinephric fluid collections noted bilaterally  STOMACH AND BOWEL:  Stomach is mildly distended with air and fluid  No gross intraluminal mass or abnormal wall thickening  The small and large bowel loops appear normal in course and caliber without evidence for obstruction or inflammation  The terminal ileum and appendix are grossly unremarkable    APPENDIX:  No findings to suggest appendicitis  ABDOMINOPELVIC CAVITY:  No ascites  No pneumoperitoneum  Multiple pathologically enlarged para-aortic/aortocaval retroperitoneal lymph nodes again seen with the largest node measuring 2 3 x 1 8 cm  Overall findings do not appear significantly changed compared to the prior exam and could represent metastatic disease given history of left testicular seminoma  Previously noted subtle hazy stranding adjacent to the right iliac vessels appear largely resolved    VESSELS:  Unremarkable for patient's age  PELVIS REPRODUCTIVE ORGANS:  Unremarkable for patient's age  URINARY BLADDER:  Moderately distended and grossly unremarkable  ABDOMINAL WALL/INGUINAL REGIONS:  Postsurgical scarring along the left inguinal/scrotal region noted  No subcutaneous mass/hematoma or fluid collections are seen  Nonspecific subcentimeter bilateral inguinal lymph nodes again noted  OSSEOUS STRUCTURES:  No acute fracture or destructive osseous lesion       Impression: Multiple pathologically enlarged para-aortic/aortocaval retroperitoneal lymph nodes again seen with the largest node measuring 2 3 x 1 8 cm  Overall findings do not appear significantly changed compared to the prior exam and could represent metastatic disease given history of left testicular seminoma  No additional areas of metastatic disease or lymphadenopathy noted in the abdomen/pelvis  Mild diffuse hepatic steatosis again seen  Postsurgical changes in the left inguinal/scrotal region  Other ancillary findings detailed above  Workstation performed: [id number]       Labs:   Lab Results   Component Value Date    WBC 12 34 (H) 02/16/2023    HGB 14 2 02/16/2023    HCT 42 0 02/16/2023    MCV 99 (H) 02/16/2023     02/16/2023     Lab Results   Component Value Date    K 3 5 02/16/2023     02/16/2023    CO2 26 02/16/2023    BUN 13 02/16/2023    CREATININE 0 85 02/16/2023    CALCIUM 9 5 02/16/2023    AST 24 02/16/2023    ALT 54 02/16/2023    ALKPHOS 114 02/16/2023    EGFR 112 02/16/2023       No results found for: IRON, TIBC, FERRITIN    Lab Results   Component Value Date    [id number] 823 07/19/2022         ROS: Review of Systems   Constitutional: Negative  Negative for appetite change, chills, diaphoresis, fatigue, fever and unexpected weight change  HENT:   Positive for hearing loss (The left ear)  Negative for lump/mass, mouth sores, nosebleeds, sore throat, trouble swallowing and voice change  Eyes: Negative  Negative for eye problems and icterus  Respiratory: Negative  Negative for chest tightness, cough, hemoptysis and shortness of breath  Cardiovascular: Negative for chest pain and leg swelling  Gastrointestinal: Negative for abdominal distention, abdominal pain, blood in stool, constipation, diarrhea and nausea  Endocrine: Negative  Genitourinary: Negative for dysuria, frequency, hematuria and pelvic pain  Musculoskeletal: Negative  Negative for arthralgias, back pain, flank pain, gait problem, myalgias and neck stiffness  Skin: Negative for itching and rash  Neurological: Negative for dizziness, gait problem, headaches, light-headedness, numbness and speech difficulty  Hematological: Negative for adenopathy  Does not bruise/bleed easily  Psychiatric/Behavioral: Negative for confusion, decreased concentration, depression and sleep disturbance  The patient is not nervous/anxious  Current Medications: Reviewed  Allergies: Reviewed  PMH/FH/SH:  Reviewed      Physical Exam:    Body surface area is 2 7 meters squared  Wt Readings from Last 3 Encounters:   03/08/23 (!) 144 kg (317 lb)   02/22/23 (!) 142 kg (314 lb)   01/20/23 (!) 139 kg (307 lb)        Temp Readings from Last 3 Encounters:   03/08/23 98 4 °F (36 9 °C) (Temporal)   01/20/23 97 9 °F (36 6 °C) (Temporal)   01/03/23 (!) 97 °F (36 1 °C)        BP Readings from Last 3 Encounters:   03/08/23 122/84   02/22/23 130/78   01/20/23 128/87         Pulse Readings from Last 3 Encounters:   03/08/23 84   02/22/23 93   01/20/23 94        Physical Exam  Vitals reviewed  Constitutional:       General: He is not in acute distress  Appearance: He is well-developed  He is obese  He is not diaphoretic  HENT:      Head: Normocephalic and atraumatic  Eyes:      Conjunctiva/sclera: Conjunctivae normal    Neck:      Trachea: No tracheal deviation  Cardiovascular:      Rate and Rhythm: Normal rate and regular rhythm  Heart sounds: No murmur heard  No friction rub  No gallop  Pulmonary:      Effort: Pulmonary effort is normal  No respiratory distress  Breath sounds: Normal breath sounds  No wheezing or rales  Chest:      Chest wall: No tenderness  Abdominal:      General: There is no distension  Palpations: Abdomen is soft  Tenderness: There is no abdominal tenderness  Musculoskeletal:      Cervical back: Normal range of motion and neck supple  Right lower leg: No edema  Left lower leg: No edema     Lymphadenopathy:      Cervical: No cervical adenopathy  Skin:     General: Skin is warm and dry  Coloration: Skin is not pale  Findings: No erythema  Neurological:      Mental Status: He is alert and oriented to person, place, and time  Psychiatric:         Behavior: Behavior normal          Thought Content: Thought content normal          Judgment: Judgment normal          ECO  Goals and Barriers:  Current Goal: Minimize effects of disease  Barriers: None  Patient's Capacity to Self Care:  Patient is able to self care      Code Status: @[facility]@

## 2023-03-08 NOTE — H&P (VIEW-ONLY)
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 aortocaval lymphadenopathy measuring 2 2 cm retrocaval lymphadenopathy measuring 1 6 cm and left iliac lymphadenopathy status post right orchiectomy on 12/2022 stage I (T1b, NX, MX, S0) pure seminoma unifocal primary 4 5 cm and negative margins no lymphovascular invasion, lymphadenopathy in the retroperitoneal area repeat CAT scan in February 2023 showed stable however enlarged lymph node    This is stage II    Now with mild elevation of LDH which is more consistent with metastatic and aggressiveness of the disease    Repeat biopsy of the lymph node showed no evidence of viable tumor however a lot of necrosis with could be seen in seminoma    Patient to be treated with ATP x4 cycles etoposide 100 mg meter square, cisplatin 20 mg per metered square 5 days every 21 days cycle side effects such as alopecia, nausea, vomiting, hearing loss, dehydration, hepatic and renal insufficiency etc  told he agreed to proceed    To prevent chemotherapy-induced neutropenia pegfilgrastim is indicated    We will ask IR for Port-A-Cath placement        Labs and imaging studies are reviewed by ordering provider once results are available  If there are findings that need immediate attention, you will be contacted when results available  Discussing results and the implication on your healthcare is best discussed in person at your follow-up visit         HPI:    44-year-old  male with history of hypertension, morbid obesity, periumbilical hernia, fatty liver, fluid retention, who felt a mass in the left testicle, on 6/18/2021 he was seen in the emergency room, ultrasound showed heterogeneous mass suspicious for malignancy referred for urology however he did not show up     According to the patient this mass has been growing up slowly, requesting evaluation by urology on 12/2022, CT scan of the abdomen and pelvis showed abnormal appearing retroperitoneal lymph nodes for example aortocaval lymph node measuring 2 2 x 2 2 cm, retrocaval lymph node measuring 1 6 x 1 5 cm, enlarged left iliac lymph node measuring 2 x 2 centimeter, fat stranding on the right anterior common/external iliac artery     Ultrasound of the scrotum showed left testicular nodules consistent with neoplasm and a small hydrocele the largest measuring 2 3 x 1 8 x 2 cm in the upper pole     Alpha-fetoprotein, hCG, LDH were normal before the surgery     Status post radical left orchiectomy on 12/14/2022         Final Diagnosis   A  Left testicle and spermatic cord, radical orchiectomy:  -Seminoma, 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   -Largest viable tumor focus measures 0 9 cm in greatest dimension    -All margins negative for carcinoma   -Definitive lymph-vascular invasion not identified, see note  -Extensive necrosis and fibrosis present  -SEE SYNOPTIC REPORT     Comment: Tumor cells are positive for OCT3/4 and  and negative for CD45, CK-AE1/AE3, CD30, glypican-3, AFP, Beta-HCG, and inhibin   Electronically signed by Miguel Dudley DO on 12/21/2022 at  3:04 PM   Note     Small focus of tumor is present within lymphvascular space without surrounding fibrin or clot   This is favored to represent carryover            CLINICAL   Pre-Orchiectomy Serum Tumor Markers   Serum marker studies within normal limits    Post-Orchiectomy Serum Tumor Markers   Unknown    Serum Tumor Markers (S)   S0 (serum marker study levels within normal limits)    SPECIMEN   Specimen Laterality   Left    TUMOR   Tumor Focality   Unifocal    Tumor Size   Greatest dimension of main tumor mass (Centimeters): 4 5 cm   Histologic Type   Seminoma    Tumor Extent   Limited to testis    Lymphovascular Invasion   Not identified    MARGINS   Margin Status   All margins negative for tumor    REGIONAL LYMPH NODES   Regional Lymph Node Status   Not applicable (no regional lymph nodes submitted or found)       Stage I (pT1b, PN X, MX, S 0)     He smokes 1 pack of cigarette daily for many years, he has 3 children, his father  with lung cancer     He reported severe acid reflux with tenderness in the epigastrium area denied any headache blurred vision chest pain he reported dyspnea on exertion abdominal pain denied any dysuria hematuria melena hematochezia or change in bowel habits     He does not drink alcohol  Repeat CT scan of the abdomen and pelvis on 2/15/2023 showed multiple enlarged para-aortic/aortocaval lymphadenopathy the largest 2 3 x 1 8 cm did not change compared to the previous CAT scan    LDH increasing to 250 (199 on 2022    Normal hCG and alpha-fetoprotein        Interval History:        Previous Treatment:         Test Results:    Imaging: CT abdomen pelvis w contrast    Result Date: 2023  Narrative: CT ABDOMEN AND PELVIS WITH IV CONTRAST INDICATION:   R59 0: Localized enlarged lymph nodes C62 92: Malignant neoplasm of left testis, unspecified whether descended or undescended N50 89: Other specified disorders of the male genital organs  COMPARISON:  12/10/2022  TECHNIQUE:  CT examination of the abdomen and pelvis was performed  Axial, sagittal, and coronal 2D reformatted images were created from the source data and submitted for interpretation  Radiation dose length product (DLP) for this visit:  536 7 mGy-cm   This examination, like all CT scans performed in the [facility], was performed utilizing techniques to minimize radiation dose exposure, including the use of iterative reconstruction and automated exposure control  IV Contrast:  99 mL of iohexol (OMNIPAQUE) Enteric Contrast:  Enteric contrast was not administered   FINDINGS: ABDOMEN LOWER CHEST:  No clinically significant abnormality identified in the visualized lower chest  LIVER/BILIARY TREE:  Liver is normal in size and contour without focal enhancing mass  There is mild diffuse hepatic steatosis  GALLBLADDER:  No calcified gallstones  No pericholecystic inflammatory change  SPLEEN:  Unremarkable  PANCREAS:  Unremarkable  ADRENAL GLANDS:  Unremarkable  KIDNEYS/URETERS:  Kidneys are normal in size and position  A too small to characterize hypodensity in the upper pole of the left kidney is again seen, statistically likely a tiny cyst   No suspicious enhancing renal mass, nephrolithiasis, hydronephrosis, or perinephric fluid collections noted bilaterally  STOMACH AND BOWEL:  Stomach is mildly distended with air and fluid  No gross intraluminal mass or abnormal wall thickening  The small and large bowel loops appear normal in course and caliber without evidence for obstruction or inflammation  The terminal ileum and appendix are grossly unremarkable    APPENDIX:  No findings to suggest appendicitis  ABDOMINOPELVIC CAVITY:  No ascites  No pneumoperitoneum  Multiple pathologically enlarged para-aortic/aortocaval retroperitoneal lymph nodes again seen with the largest node measuring 2 3 x 1 8 cm  Overall findings do not appear significantly changed compared to the prior exam and could represent metastatic disease given history of left testicular seminoma  Previously noted subtle hazy stranding adjacent to the right iliac vessels appear largely resolved    VESSELS:  Unremarkable for patient's age  PELVIS REPRODUCTIVE ORGANS:  Unremarkable for patient's age  URINARY BLADDER:  Moderately distended and grossly unremarkable  ABDOMINAL WALL/INGUINAL REGIONS:  Postsurgical scarring along the left inguinal/scrotal region noted  No subcutaneous mass/hematoma or fluid collections are seen  Nonspecific subcentimeter bilateral inguinal lymph nodes again noted  OSSEOUS STRUCTURES:  No acute fracture or destructive osseous lesion       Impression: Multiple pathologically enlarged para-aortic/aortocaval retroperitoneal lymph nodes again seen with the largest node measuring 2 3 x 1 8 cm  Overall findings do not appear significantly changed compared to the prior exam and could represent metastatic disease given history of left testicular seminoma  No additional areas of metastatic disease or lymphadenopathy noted in the abdomen/pelvis  Mild diffuse hepatic steatosis again seen  Postsurgical changes in the left inguinal/scrotal region  Other ancillary findings detailed above  Workstation performed: [id number]       Labs:   Lab Results   Component Value Date    WBC 12 34 (H) 02/16/2023    HGB 14 2 02/16/2023    HCT 42 0 02/16/2023    MCV 99 (H) 02/16/2023     02/16/2023     Lab Results   Component Value Date    K 3 5 02/16/2023     02/16/2023    CO2 26 02/16/2023    BUN 13 02/16/2023    CREATININE 0 85 02/16/2023    CALCIUM 9 5 02/16/2023    AST 24 02/16/2023    ALT 54 02/16/2023    ALKPHOS 114 02/16/2023    EGFR 112 02/16/2023       No results found for: IRON, TIBC, FERRITIN    Lab Results   Component Value Date    ONNZIARR16 956 07/19/2022         ROS: Review of Systems   Constitutional: Negative  Negative for appetite change, chills, diaphoresis, fatigue, fever and unexpected weight change  HENT:   Positive for hearing loss (The left ear)  Negative for lump/mass, mouth sores, nosebleeds, sore throat, trouble swallowing and voice change  Eyes: Negative  Negative for eye problems and icterus  Respiratory: Negative  Negative for chest tightness, cough, hemoptysis and shortness of breath  Cardiovascular: Negative for chest pain and leg swelling  Gastrointestinal: Negative for abdominal distention, abdominal pain, blood in stool, constipation, diarrhea and nausea  Endocrine: Negative  Genitourinary: Negative for dysuria, frequency, hematuria and pelvic pain  Musculoskeletal: Negative  Negative for arthralgias, back pain, flank pain, gait problem, myalgias and neck stiffness  Skin: Negative for itching and rash  Neurological: Negative for dizziness, gait problem, headaches, light-headedness, numbness and speech difficulty  Hematological: Negative for adenopathy  Does not bruise/bleed easily  Psychiatric/Behavioral: Negative for confusion, decreased concentration, depression and sleep disturbance  The patient is not nervous/anxious  Current Medications: Reviewed  Allergies: Reviewed  PMH/FH/SH:  Reviewed      Physical Exam:    Body surface area is 2 7 meters squared  Wt Readings from Last 3 Encounters:   03/08/23 (!) 144 kg (317 lb)   02/22/23 (!) 142 kg (314 lb)   01/20/23 (!) 139 kg (307 lb)        Temp Readings from Last 3 Encounters:   03/08/23 98 4 °F (36 9 °C) (Temporal)   01/20/23 97 9 °F (36 6 °C) (Temporal)   01/03/23 (!) 97 °F (36 1 °C)        BP Readings from Last 3 Encounters:   03/08/23 122/84   02/22/23 130/78   01/20/23 128/87         Pulse Readings from Last 3 Encounters:   03/08/23 84   02/22/23 93   01/20/23 94        Physical Exam  Vitals reviewed  Constitutional:       General: He is not in acute distress  Appearance: He is well-developed  He is obese  He is not diaphoretic  HENT:      Head: Normocephalic and atraumatic  Eyes:      Conjunctiva/sclera: Conjunctivae normal    Neck:      Trachea: No tracheal deviation  Cardiovascular:      Rate and Rhythm: Normal rate and regular rhythm  Heart sounds: No murmur heard  No friction rub  No gallop  Pulmonary:      Effort: Pulmonary effort is normal  No respiratory distress  Breath sounds: Normal breath sounds  No wheezing or rales  Chest:      Chest wall: No tenderness  Abdominal:      General: There is no distension  Palpations: Abdomen is soft  Tenderness: There is no abdominal tenderness  Musculoskeletal:      Cervical back: Normal range of motion and neck supple  Right lower leg: No edema  Left lower leg: No edema     Lymphadenopathy:      Cervical: No cervical adenopathy  Skin:     General: Skin is warm and dry  Coloration: Skin is not pale  Findings: No erythema  Neurological:      Mental Status: He is alert and oriented to person, place, and time  Psychiatric:         Behavior: Behavior normal          Thought Content: Thought content normal          Judgment: Judgment normal          ECO  Goals and Barriers:  Current Goal: Minimize effects of disease  Barriers: None  Patient's Capacity to Self Care:  Patient is able to self care      Code Status: @[facility]@

## 2023-03-08 NOTE — PROGRESS NOTES
ONCOLOGY CHECKLIST     ONCOLOGY TREATMENT     Task Priority Due Responsible Completed Completed By Step Outcome    Discussed role of Hopeline    3/8/2023 Justin Mattson RN          Orders placed for pre-treatment labs    3/8/2023 Justin Mattson RN          Perform IV assessment, review PORT procedure    3/8/2023 Justin Mattson RN          Review schedule / calendar    3/8/2023 Justin Mattson RN          Reviewed common side-effects    3/8/2023 Justin Mattson RN          Reviewed if transportation assistance is needed    3/8/2023 Justin Mattson RN          Reviewed medical oncology nurse contact information    3/8/2023 Justin Mattson RN          Reviewed regimen specific education sheets    3/8/2023 Justin Mattson RN          Reviewed use of supportive medication    3/8/2023 Justin Mattson RN          Reviewed what to expect the first day of treatment    3/8/2023 Justin Mattson RN          Reviewed when blood work needs to be completed (initial and subsequent draws)    3/8/2023 Justin Mattson RN          Reviewed when to call (temp greater than 100 4, uncontrolled vomiting, diarrhea, etc )    3/8/2023 Justin Mattson RN          Scripts for support medication sent to pharmacy    3/8/2023 Justin Mattson RN

## 2023-03-08 NOTE — TELEPHONE ENCOUNTER
Spoke with Andrés Garland, he will go to [facility] and then come back to  when there is availability  Thanks!

## 2023-03-08 NOTE — TELEPHONE ENCOUNTER
Return in about 6 weeks (around 4/19/2023) for with fercho Lux    While we try to accommodate patient requests, our priority is to schedule treatment according to Doctor's orders and site availability  1  Does the Provider use the intake sheet or checkout note? 2  What would be a preferred day of the week that would work best for your infusion appointment? Anyday  3  Do you prefer mornings or afternoons for your appointments? Morning  4  Are there any days or dates that do not work for your schedule, including any upcoming vacations? No  5  We are going to try our best to schedule you at the infusion center closest to your home  In the event that we are unable to what would be your next preferred infusion site or sites? 1  AN  2  BE    6  Do you have transportation to take you to all of your appointments? Yes  7  Would you like the infusion center to draw labs from your port? (disregard if patient doesn't have a port or need labs for infusion appointment) Will be getting a port and would like the blood drawn from it

## 2023-03-09 NOTE — TELEPHONE ENCOUNTER
Spoke with patient, he said he'd view his appointments on his MyChart but I did verbally confirm first treatment with him  He is aware that IR will be reaching out to schedule his port placement   Has my phone number for any questions

## 2023-03-10 ENCOUNTER — TELEPHONE (OUTPATIENT)
Dept: INTERVENTIONAL RADIOLOGY/VASCULAR | Facility: HOSPITAL | Age: 38
End: 2023-03-10

## 2023-03-10 RX ORDER — SODIUM CHLORIDE 9 MG/ML
75 INJECTION, SOLUTION INTRAVENOUS CONTINUOUS
Status: CANCELLED | OUTPATIENT
Start: 2023-03-10

## 2023-03-13 DIAGNOSIS — C62.92 SEMINOMA OF LEFT TESTIS (HCC): Primary | ICD-10-CM

## 2023-03-13 RX ORDER — MAGNESIUM SULFATE HEPTAHYDRATE 40 MG/ML
2 INJECTION, SOLUTION INTRAVENOUS ONCE
Status: CANCELLED
Start: 2023-03-23 | End: 2023-03-23

## 2023-03-13 RX ORDER — SODIUM CHLORIDE 9 MG/ML
20 INJECTION, SOLUTION INTRAVENOUS ONCE
Status: CANCELLED | OUTPATIENT
Start: 2023-03-22

## 2023-03-13 RX ORDER — SODIUM CHLORIDE 9 MG/ML
20 INJECTION, SOLUTION INTRAVENOUS ONCE
Status: CANCELLED | OUTPATIENT
Start: 2023-03-21

## 2023-03-13 RX ORDER — MAGNESIUM SULFATE HEPTAHYDRATE 40 MG/ML
2 INJECTION, SOLUTION INTRAVENOUS ONCE
Status: CANCELLED
Start: 2023-03-21 | End: 2023-03-21

## 2023-03-13 RX ORDER — SODIUM CHLORIDE 9 MG/ML
20 INJECTION, SOLUTION INTRAVENOUS ONCE
Status: CANCELLED | OUTPATIENT
Start: 2023-03-24

## 2023-03-13 RX ORDER — SODIUM CHLORIDE 9 MG/ML
20 INJECTION, SOLUTION INTRAVENOUS ONCE
Status: CANCELLED | OUTPATIENT
Start: 2023-03-23

## 2023-03-13 RX ORDER — SODIUM CHLORIDE 9 MG/ML
20 INJECTION, SOLUTION INTRAVENOUS ONCE
Status: CANCELLED | OUTPATIENT
Start: 2023-03-20

## 2023-03-14 ENCOUNTER — DOCUMENTATION (OUTPATIENT)
Dept: HEMATOLOGY ONCOLOGY | Facility: CLINIC | Age: 38
End: 2023-03-14

## 2023-03-14 ENCOUNTER — HOSPITAL ENCOUNTER (OUTPATIENT)
Dept: INTERVENTIONAL RADIOLOGY/VASCULAR | Facility: HOSPITAL | Age: 38
Discharge: HOME | End: 2023-03-14
Attending: INTERNAL MEDICINE | Admitting: RADIOLOGY

## 2023-03-14 VITALS
DIASTOLIC BLOOD PRESSURE: 92 MMHG | OXYGEN SATURATION: 97 % | HEIGHT: 76 IN | BODY MASS INDEX: 38.36 KG/M2 | SYSTOLIC BLOOD PRESSURE: 155 MMHG | WEIGHT: 315 LBS | TEMPERATURE: 98.9 F | HEART RATE: 68 BPM | RESPIRATION RATE: 18 BRPM

## 2023-03-14 DIAGNOSIS — D70.1 CHEMOTHERAPY INDUCED NEUTROPENIA (HCC): ICD-10-CM

## 2023-03-14 DIAGNOSIS — C62.92 SEMINOMA OF LEFT TESTIS (HCC): ICD-10-CM

## 2023-03-14 DIAGNOSIS — T45.1X5A CHEMOTHERAPY INDUCED NEUTROPENIA (HCC): ICD-10-CM

## 2023-03-14 RX ORDER — ACETAMINOPHEN 325 MG/1
650 TABLET ORAL EVERY 6 HOURS PRN
Status: DISCONTINUED | OUTPATIENT
Start: 2023-03-14 | End: 2023-03-15 | Stop reason: HOSPADM

## 2023-03-14 RX ORDER — FENTANYL CITRATE 50 UG/ML
INJECTION, SOLUTION INTRAMUSCULAR; INTRAVENOUS AS NEEDED
Status: COMPLETED | OUTPATIENT
Start: 2023-03-14 | End: 2023-03-14

## 2023-03-14 RX ORDER — MIDAZOLAM HYDROCHLORIDE 2 MG/2ML
INJECTION, SOLUTION INTRAMUSCULAR; INTRAVENOUS AS NEEDED
Status: COMPLETED | OUTPATIENT
Start: 2023-03-14 | End: 2023-03-14

## 2023-03-14 RX ORDER — SODIUM CHLORIDE 9 MG/ML
75 INJECTION, SOLUTION INTRAVENOUS CONTINUOUS
Status: DISCONTINUED | OUTPATIENT
Start: 2023-03-14 | End: 2023-03-15 | Stop reason: HOSPADM

## 2023-03-14 RX ADMIN — CEFAZOLIN 3000 MG: 1 INJECTION, POWDER, FOR SOLUTION INTRAMUSCULAR; INTRAVENOUS at 15:20

## 2023-03-14 RX ADMIN — FENTANYL CITRATE 25 MCG: 50 INJECTION, SOLUTION INTRAMUSCULAR; INTRAVENOUS at 15:56

## 2023-03-14 RX ADMIN — ACETAMINOPHEN 650 MG: 325 TABLET ORAL at 17:04

## 2023-03-14 RX ADMIN — MIDAZOLAM 0.5 MG: 1 INJECTION INTRAMUSCULAR; INTRAVENOUS at 15:52

## 2023-03-14 RX ADMIN — SODIUM CHLORIDE 75 ML/HR: 0.9 INJECTION, SOLUTION INTRAVENOUS at 12:34

## 2023-03-14 RX ADMIN — Medication 20 ML: at 15:57

## 2023-03-14 NOTE — BRIEF OP NOTE (RAD/CATH)
IR PORT PLACEMENT  Procedure Note    PATIENT NAME: Poly Pak  : 1985  MRN: [id number]     Pre-op Diagnosis:   1  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[address] )    2  Chemotherapy induced neutropenia (HCC)      Post-op Diagnosis:   1  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[facility] [address] )    2  Chemotherapy induced neutropenia [facility])        Surgeon:   Judie Suero DO  Assistants:     No qualified resident was available      Estimated Blood Loss: none  Findings: Right IJ chest port placed    Specimens: none    Complications:  none    Anesthesia: conscious sedation and local    Judie Suero DO     Date: 3/14/2023  Time: 4:33 PM

## 2023-03-14 NOTE — NURSING NOTE
Pt is awake,alert,tolerated diet  Written and verbal instructions given to pt and his mother, who verbalize an understanding and voices no questions or complaints

## 2023-03-14 NOTE — DISCHARGE INSTRUCTIONS
Implanted Venous Access Port     WHAT YOU NEED TO KNOW:   An implanted venous access port is a device used to give treatments and take blood  It may also be called a central venous access device (CVAD)  The port is a small container that is placed under your skin, usually in your upper chest  The port is attached to a catheter that enters a large vein  DISCHARGE INSTRUCTIONS:   Resume your normal diet  Small sips of flat soda will help with mild nausea  Prevent an infection:   Wash your hands often  Use soap and water  Clean your hands before and after you care for your port  Remind everyone who cares for your port to wash their hands  Check your skin for infection every day  Look for redness, swelling, or fluid oozing from the port site  Care for your port:   1  You may shower beginning 48 hours after procedure  2   Leave glue in place  3  It is normal for some bruising to occur  4  Use Tylenol for pain  5  Limit use of arm on the side that your port was placed  Lift nothing heavier than 5 pounds for 1 week, and then gradually increase activity as tolerated  6  DO NOT apply ointment, lotion or cream to port site until incision is healed  Allow glue to fall off  DO NOT attempt to peel glue from skin even it it begins to flake  7  After the port incision is healed you may swim, bathe  Notify the Interventional Radiologist if you have any of the followin  Fever above 101 F    2  Increased redness or swelling after 1st day  3  Increased pain after 1st day  4  Any sign of infection (drainage from port site, skin separation, hot to touch)  5  Persistent nausea or vomiting  Contact Interventional Radiology at [phone] [facility] PATIENTS: Contact Interventional Radiology at [phone]) ([address]: Contact Interventional Radiology at [phone])   Procedural Sedation   WHAT YOU NEED TO KNOW:   Procedural sedation is medicine used during procedures to help you feel relaxed and calm  You will remember little to none of the procedure  After sedation you may feel tired, weak, or unsteady on your feet  You may also have trouble concentrating or short-term memory loss  These symptoms should go away in 24 hours or less  DISCHARGE INSTRUCTIONS:     Call 911 or have someone else call for any of the following: You have sudden trouble breathing  You cannot be woken  Contact Interventional Radiology at [phone]   [place] PATIENTS: Contact Interventional Radiology at [phone]) Sheryl Mary PATIENTS: Contact Interventional Radiology at [phone]) if any of the following occur: You have a severe headache or dizziness  Your heart is beating faster than usual     You have a fever or chills  Your skin is itchy, swollen, or you have a rash  You have nausea or are vomiting for more than 8 hours after the procedure  You have questions or concerns about your condition or care  Self-care:   Have someone stay with you for 24 hours  This person can drive you to errands and help you do things around the house  This person can also watch for problems  Rest and do quiet activities for 24 hours  Do not exercise, ride a bike, or play sports  Stand up slowly to prevent dizziness and falls  Take short walks around the house with another person  Slowly return to your usual activities the next day  Do not drive or use dangerous machines or tools for 24 hours  You may injure yourself or others  Examples include a lawnmower, saw, or drill  Do not return to work for 24 hours if you use dangerous machines or tools for work  Do not make important decisions for 24 hours  For example, do not sign important papers or invest money  Drink liquids as directed  Liquids help flush the sedation medicine out of your body  Ask how much liquid to drink each day and which liquids are best for you  Eat small, frequent meals to prevent nausea and vomiting   Start with clear liquids such as juice or broth  If you do not vomit after clear liquids, you can eat your usual foods  Do not drink alcohol or take medicines that make you drowsy  This includes medicines that help you sleep and anxiety medicines  Ask your healthcare provider if it is safe for you to take pain medicine  Follow up with your healthcare provider as directed: Write down your questions so you remember to ask them during your visits

## 2023-03-14 NOTE — PROGRESS NOTES
Oncology Finance Advocacy Intake and Intervention  Oncology Finance Counselor/Advocate placed call to patient. This writer informed patient that this writer is here to assist patient with billing questions, financial assistance, payment/payment plans, quotes, copayment assistance, insurance optimization, and insurance navigation.    This writer conducted a thorough benefit review of copayment, deductible, and out of pocket cost. This information is documented below and has been reviewed with patient.     Copayment: N/A  Deductible: N/A  Out of Pocket Cost: N/A  Insurance optimization (Limited benefit vs self-pay):  Patient assistance status:  Free Drug Applications:  Interventions:  Pt has active [employer]        Information above was review thoroughly with patient and patient was advise of possible assistance programs/interventions. If any question arise patient can contact this writer at below information. This information was given to patient at time of contact.      Zenaida Walls  Phone:[phone]  Email: [email or web address]

## 2023-03-14 NOTE — INTERVAL H&P NOTE
H&P reviewed  After examining the patient, I find no changed to the H&P since it had been written  /99 (BP Location: Right arm)   Pulse 72   Temp (!) 97 2 °F (36 2 °C) (Temporal)   Resp 18   Ht 6' 4" (1 93 m)   Wt (!) 144 kg (317 lb)   SpO2 97%   BMI 38 59 kg/m²     Patient re-evaluated   Accept as history and physical     Abelardo Camarena, DO/March 14, 2023/3:27 PM

## 2023-03-18 ENCOUNTER — TELEPHONE (OUTPATIENT)
Dept: LAB | Facility: HOSPITAL | Age: 38
End: 2023-03-18

## 2023-03-19 ENCOUNTER — APPOINTMENT (OUTPATIENT)
Dept: LAB | Facility: CLINIC | Age: 38
End: 2023-03-19

## 2023-03-19 DIAGNOSIS — E87.1 HYPONATREMIA: ICD-10-CM

## 2023-03-19 DIAGNOSIS — T45.1X5A CHEMOTHERAPY INDUCED NEUTROPENIA (HCC): ICD-10-CM

## 2023-03-19 DIAGNOSIS — D70.1 CHEMOTHERAPY INDUCED NEUTROPENIA (HCC): ICD-10-CM

## 2023-03-19 DIAGNOSIS — C62.92 SEMINOMA OF LEFT TESTIS (HCC): ICD-10-CM

## 2023-03-19 LAB
ALBUMIN SERPL BCP-MCNC: 4.4 G/DL (ref 3.5–5)
ALP SERPL-CCNC: 127 U/L (ref 34–104)
ALT SERPL W P-5'-P-CCNC: 42 U/L (ref 7–52)
ANION GAP SERPL CALCULATED.3IONS-SCNC: 8 MMOL/L (ref 4–13)
AST SERPL W P-5'-P-CCNC: 21 U/L (ref 13–39)
BASOPHILS # BLD AUTO: 0.06 THOUSANDS/ÂΜL (ref 0–0.1)
BASOPHILS NFR BLD AUTO: 1 % (ref 0–1)
BILIRUB SERPL-MCNC: 0.71 MG/DL (ref 0.2–1)
BUN SERPL-MCNC: 13 MG/DL (ref 5–25)
CALCIUM SERPL-MCNC: 9.7 MG/DL (ref 8.4–10.2)
CHLORIDE SERPL-SCNC: 97 MMOL/L (ref 96–108)
CO2 SERPL-SCNC: 31 MMOL/L (ref 21–32)
CREAT SERPL-MCNC: 1.05 MG/DL (ref 0.6–1.3)
EOSINOPHIL # BLD AUTO: 0.13 THOUSAND/ÂΜL (ref 0–0.61)
EOSINOPHIL NFR BLD AUTO: 1 % (ref 0–6)
ERYTHROCYTE [DISTWIDTH] IN BLOOD BY AUTOMATED COUNT: 13.2 % (ref 11.6–15.1)
GFR SERPL CREATININE-BSD FRML MDRD: 91 ML/MIN/1.73SQ M
GLUCOSE SERPL-MCNC: 106 MG/DL (ref 65–140)
HCT VFR BLD AUTO: 46 % (ref 36.5–49.3)
HGB BLD-MCNC: 15.3 G/DL (ref 12–17)
IMM GRANULOCYTES # BLD AUTO: 0.08 THOUSAND/UL (ref 0–0.2)
IMM GRANULOCYTES NFR BLD AUTO: 1 % (ref 0–2)
LYMPHOCYTES # BLD AUTO: 1.97 THOUSANDS/ÂΜL (ref 0.6–4.47)
LYMPHOCYTES NFR BLD AUTO: 17 % (ref 14–44)
MAGNESIUM SERPL-MCNC: 2.1 MG/DL (ref 1.9–2.7)
MCH RBC QN AUTO: 32.6 PG (ref 26.8–34.3)
MCHC RBC AUTO-ENTMCNC: 33.3 G/DL (ref 31.4–37.4)
MCV RBC AUTO: 98 FL (ref 82–98)
MONOCYTES # BLD AUTO: 0.74 THOUSAND/ÂΜL (ref 0.17–1.22)
MONOCYTES NFR BLD AUTO: 7 % (ref 4–12)
NEUTROPHILS # BLD AUTO: 8.38 THOUSANDS/ÂΜL (ref 1.85–7.62)
NEUTS SEG NFR BLD AUTO: 73 % (ref 43–75)
NRBC BLD AUTO-RTO: 0 /100 WBCS
PLATELET # BLD AUTO: 316 THOUSANDS/UL (ref 149–390)
PMV BLD AUTO: 8.9 FL (ref 8.9–12.7)
POTASSIUM SERPL-SCNC: 3.5 MMOL/L (ref 3.5–5.3)
PROT SERPL-MCNC: 7.7 G/DL (ref 6.4–8.4)
RBC # BLD AUTO: 4.7 MILLION/UL (ref 3.88–5.62)
SODIUM SERPL-SCNC: 136 MMOL/L (ref 135–147)
WBC # BLD AUTO: 11.36 THOUSAND/UL (ref 4.31–10.16)

## 2023-03-20 ENCOUNTER — HOSPITAL ENCOUNTER (OUTPATIENT)
Dept: INFUSION CENTER | Facility: HOSPITAL | Age: 38
Discharge: HOME | End: 2023-03-20
Attending: INTERNAL MEDICINE

## 2023-03-20 VITALS
OXYGEN SATURATION: 97 % | RESPIRATION RATE: 18 BRPM | HEART RATE: 96 BPM | DIASTOLIC BLOOD PRESSURE: 77 MMHG | SYSTOLIC BLOOD PRESSURE: 108 MMHG | BODY MASS INDEX: 38.01 KG/M2 | TEMPERATURE: 98.2 F | WEIGHT: 312.17 LBS | HEIGHT: 76 IN

## 2023-03-20 DIAGNOSIS — C62.92 SEMINOMA OF LEFT TESTIS (HCC): ICD-10-CM

## 2023-03-20 DIAGNOSIS — D70.1 CHEMOTHERAPY INDUCED NEUTROPENIA (HCC): Primary | ICD-10-CM

## 2023-03-20 DIAGNOSIS — T45.1X5A CHEMOTHERAPY INDUCED NEUTROPENIA (HCC): Primary | ICD-10-CM

## 2023-03-20 RX ORDER — SODIUM CHLORIDE 9 MG/ML
20 INJECTION, SOLUTION INTRAVENOUS ONCE
Status: COMPLETED | OUTPATIENT
Start: 2023-03-20 | End: 2023-03-20

## 2023-03-20 RX ADMIN — APREPITANT 130 MG: 130 INJECTION, EMULSION INTRAVENOUS at 09:13

## 2023-03-20 RX ADMIN — SODIUM CHLORIDE 20 ML/HR: 0.9 INJECTION, SOLUTION INTRAVENOUS at 08:41

## 2023-03-20 RX ADMIN — SODIUM CHLORIDE 500 ML: 0.9 INJECTION, SOLUTION INTRAVENOUS at 12:19

## 2023-03-20 RX ADMIN — ETOPOSIDE 270 MG: 20 INJECTION INTRAVENOUS at 11:05

## 2023-03-20 RX ADMIN — SODIUM CHLORIDE 500 ML: 0.9 INJECTION, SOLUTION INTRAVENOUS at 08:42

## 2023-03-20 RX ADMIN — DEXAMETHASONE SODIUM PHOSPHATE: 10 INJECTION, SOLUTION INTRAMUSCULAR; INTRAVENOUS at 08:41

## 2023-03-20 RX ADMIN — CISPLATIN 54 MG: 1 INJECTION INTRAVENOUS at 09:57

## 2023-03-20 NOTE — PLAN OF CARE
Problem: Potential for Falls  Goal: Patient will remain free of falls  Description: INTERVENTIONS:  - Educate patient/family on patient safety including physical limitations  - Instruct patient to call for assistance with activity   - Consult OT/PT to assist with strengthening/mobility   - Keep Call bell within reach  - Keep bed low and locked with side rails adjusted as appropriate  - Keep care items and personal belongings within reach  - Initiate and maintain comfort rounds  - Make Fall Risk Sign visible to staff  - Apply yellow socks and bracelet for high fall risk patients  - Consider moving patient to room near nurses station  Outcome: Progressing
(4) excellent

## 2023-03-20 NOTE — PROGRESS NOTES
Patient arrived with sister for cycle 1 day 1 today cisplantin/etoposide  Patient was SOB on walk in and continued in chair while at rest   Patient stated he has been like this for the past 2 months  Breath sounds clear bilaterally but diminished at bases  Spoke with Surekha Roque RN and per Dr Scar Parikh, ok to proceed with chemo today, Dr Scar Parikh looked at his scans and echo and no need for PFT's at this time  Will continue to monitor  Patient then otherwise tolerated chemo today without complications  Port remains accessed for day 2 tomorrow  Reminded patient appt is 8am tomorrow  Patient discharged via wheelchair with volunteer and sister  Also confirmed Dr Scar Parikh is ok with patient getting mag only on Day 2 and Day 4

## 2023-03-21 ENCOUNTER — HOSPITAL ENCOUNTER (OUTPATIENT)
Dept: INFUSION CENTER | Facility: HOSPITAL | Age: 38
Discharge: HOME | End: 2023-03-21
Attending: INTERNAL MEDICINE

## 2023-03-21 VITALS
OXYGEN SATURATION: 96 % | DIASTOLIC BLOOD PRESSURE: 70 MMHG | RESPIRATION RATE: 18 BRPM | BODY MASS INDEX: 38.36 KG/M2 | SYSTOLIC BLOOD PRESSURE: 101 MMHG | HEIGHT: 76 IN | WEIGHT: 315 LBS | HEART RATE: 71 BPM | TEMPERATURE: 98.3 F

## 2023-03-21 DIAGNOSIS — C62.92 SEMINOMA OF LEFT TESTIS (HCC): ICD-10-CM

## 2023-03-21 DIAGNOSIS — D70.1 CHEMOTHERAPY INDUCED NEUTROPENIA (HCC): Primary | ICD-10-CM

## 2023-03-21 DIAGNOSIS — T45.1X5A CHEMOTHERAPY INDUCED NEUTROPENIA (HCC): Primary | ICD-10-CM

## 2023-03-21 RX ORDER — MAGNESIUM SULFATE HEPTAHYDRATE 40 MG/ML
2 INJECTION, SOLUTION INTRAVENOUS ONCE
Status: COMPLETED | OUTPATIENT
Start: 2023-03-21 | End: 2023-03-21

## 2023-03-21 RX ORDER — SODIUM CHLORIDE 9 MG/ML
20 INJECTION, SOLUTION INTRAVENOUS ONCE
Status: COMPLETED | OUTPATIENT
Start: 2023-03-21 | End: 2023-03-21

## 2023-03-21 RX ADMIN — DEXAMETHASONE SODIUM PHOSPHATE: 10 INJECTION, SOLUTION INTRAMUSCULAR; INTRAVENOUS at 09:32

## 2023-03-21 RX ADMIN — SODIUM CHLORIDE 500 ML: 0.9 INJECTION, SOLUTION INTRAVENOUS at 08:26

## 2023-03-21 RX ADMIN — CISPLATIN 54 MG: 1 INJECTION INTRAVENOUS at 10:12

## 2023-03-21 RX ADMIN — SODIUM CHLORIDE 20 ML/HR: 0.9 INJECTION, SOLUTION INTRAVENOUS at 08:26

## 2023-03-21 RX ADMIN — ETOPOSIDE 270 MG: 20 INJECTION INTRAVENOUS at 11:24

## 2023-03-21 RX ADMIN — MAGNESIUM SULFATE HEPTAHYDRATE 2 G: 2 INJECTION, SOLUTION INTRAVENOUS at 08:27

## 2023-03-21 RX ADMIN — SODIUM CHLORIDE 500 ML: 0.9 INJECTION, SOLUTION INTRAVENOUS at 12:30

## 2023-03-21 NOTE — PROGRESS NOTES
Pt tolerated day 2 pre/post hydration, mag, cisplatin, etoposide without complications  Confirmed appt back tomorrow  Port remaining accessed

## 2023-03-22 ENCOUNTER — HOSPITAL ENCOUNTER (OUTPATIENT)
Dept: INFUSION CENTER | Facility: HOSPITAL | Age: 38
Discharge: HOME | End: 2023-03-22
Attending: INTERNAL MEDICINE

## 2023-03-22 VITALS
RESPIRATION RATE: 18 BRPM | HEIGHT: 76 IN | DIASTOLIC BLOOD PRESSURE: 84 MMHG | TEMPERATURE: 98.5 F | WEIGHT: 315 LBS | HEART RATE: 90 BPM | SYSTOLIC BLOOD PRESSURE: 118 MMHG | OXYGEN SATURATION: 98 % | BODY MASS INDEX: 38.36 KG/M2

## 2023-03-22 DIAGNOSIS — T45.1X5A CHEMOTHERAPY INDUCED NEUTROPENIA (HCC): Primary | ICD-10-CM

## 2023-03-22 DIAGNOSIS — C62.92 SEMINOMA OF LEFT TESTIS (HCC): ICD-10-CM

## 2023-03-22 DIAGNOSIS — D70.1 CHEMOTHERAPY INDUCED NEUTROPENIA (HCC): Primary | ICD-10-CM

## 2023-03-22 RX ORDER — SODIUM CHLORIDE 9 MG/ML
20 INJECTION, SOLUTION INTRAVENOUS ONCE
Status: COMPLETED | OUTPATIENT
Start: 2023-03-22 | End: 2023-03-22

## 2023-03-22 RX ORDER — FUROSEMIDE 10 MG/ML
20 INJECTION INTRAMUSCULAR; INTRAVENOUS ONCE
Status: COMPLETED | OUTPATIENT
Start: 2023-03-22 | End: 2023-03-22

## 2023-03-22 RX ORDER — FUROSEMIDE 10 MG/ML
20 INJECTION INTRAMUSCULAR; INTRAVENOUS ONCE
Status: CANCELLED
Start: 2023-03-22 | End: 2023-03-22

## 2023-03-22 RX ADMIN — CISPLATIN 54 MG: 1 INJECTION INTRAVENOUS at 11:08

## 2023-03-22 RX ADMIN — SODIUM CHLORIDE 500 ML: 0.9 INJECTION, SOLUTION INTRAVENOUS at 09:59

## 2023-03-22 RX ADMIN — SODIUM CHLORIDE 20 ML/HR: 9 INJECTION, SOLUTION INTRAVENOUS at 09:58

## 2023-03-22 RX ADMIN — ETOPOSIDE 270 MG: 20 INJECTION INTRAVENOUS at 12:14

## 2023-03-22 RX ADMIN — FUROSEMIDE 20 MG: 10 INJECTION, SOLUTION INTRAVENOUS at 09:52

## 2023-03-22 RX ADMIN — DEXAMETHASONE SODIUM PHOSPHATE: 10 INJECTION, SOLUTION INTRAMUSCULAR; INTRAVENOUS at 09:58

## 2023-03-22 RX ADMIN — SODIUM CHLORIDE 500 ML: 0.9 INJECTION, SOLUTION INTRAVENOUS at 13:18

## 2023-03-22 NOTE — PROGRESS NOTES
Patient presents for day 3 of treatment with a 14 lb weight gain, reports mild dizziness and worsening shortness of breath  Upon assessment SPO2 98% on RA, lungs clear with exception of diminished bilateral posterior bases  Patient appears in no acute distress  Lacretia Wesley RN/Dr Alyssa Lechuga notified and aware, states administer 20mg IV Lasix before treatment and proceed with plan    ~1000: Lasix administered without incident, treatment initiated  Patient urinated 1000mL clear yelliow urine, Lacretia Wesley notified and aware  Tolerated IV chemotherapy without issues  Dressing changed, port flushed and remained accessed for tomorrow's appointment  Confirmed appointment, AVS declined

## 2023-03-23 ENCOUNTER — TELEPHONE (OUTPATIENT)
Dept: HEMATOLOGY ONCOLOGY | Facility: CLINIC | Age: 38
End: 2023-03-23

## 2023-03-23 ENCOUNTER — NURSE TRIAGE (OUTPATIENT)
Dept: OTHER | Facility: OTHER | Age: 38
End: 2023-03-23

## 2023-03-23 ENCOUNTER — HOSPITAL ENCOUNTER (OUTPATIENT)
Dept: INFUSION CENTER | Facility: HOSPITAL | Age: 38
End: 2023-03-23
Attending: INTERNAL MEDICINE

## 2023-03-23 VITALS
WEIGHT: 315 LBS | BODY MASS INDEX: 38.36 KG/M2 | HEART RATE: 86 BPM | SYSTOLIC BLOOD PRESSURE: 119 MMHG | HEIGHT: 76 IN | RESPIRATION RATE: 20 BRPM | DIASTOLIC BLOOD PRESSURE: 86 MMHG | TEMPERATURE: 97.1 F | OXYGEN SATURATION: 96 %

## 2023-03-23 DIAGNOSIS — T45.1X5A CHEMOTHERAPY INDUCED NEUTROPENIA (HCC): Primary | ICD-10-CM

## 2023-03-23 DIAGNOSIS — C62.92 SEMINOMA OF LEFT TESTIS (HCC): Primary | ICD-10-CM

## 2023-03-23 DIAGNOSIS — C62.92 SEMINOMA OF LEFT TESTIS (HCC): ICD-10-CM

## 2023-03-23 DIAGNOSIS — D70.1 CHEMOTHERAPY INDUCED NEUTROPENIA (HCC): Primary | ICD-10-CM

## 2023-03-23 RX ORDER — FUROSEMIDE 10 MG/ML
20 INJECTION INTRAMUSCULAR; INTRAVENOUS ONCE
Status: CANCELLED
Start: 2023-03-24 | End: 2023-03-24

## 2023-03-23 RX ORDER — MAGNESIUM SULFATE HEPTAHYDRATE 40 MG/ML
2 INJECTION, SOLUTION INTRAVENOUS ONCE
Status: COMPLETED | OUTPATIENT
Start: 2023-03-23 | End: 2023-03-23

## 2023-03-23 RX ORDER — SODIUM CHLORIDE 9 MG/ML
20 INJECTION, SOLUTION INTRAVENOUS ONCE
Status: COMPLETED | OUTPATIENT
Start: 2023-03-23 | End: 2023-03-23

## 2023-03-23 RX ADMIN — CISPLATIN 54 MG: 1 INJECTION INTRAVENOUS at 10:20

## 2023-03-23 RX ADMIN — ETOPOSIDE 270 MG: 20 INJECTION INTRAVENOUS at 11:35

## 2023-03-23 RX ADMIN — SODIUM CHLORIDE 20 ML/HR: 0.9 INJECTION, SOLUTION INTRAVENOUS at 08:25

## 2023-03-23 RX ADMIN — MAGNESIUM SULFATE HEPTAHYDRATE 2 G: 2 INJECTION, SOLUTION INTRAVENOUS at 08:44

## 2023-03-23 RX ADMIN — DEXAMETHASONE SODIUM PHOSPHATE: 10 INJECTION, SOLUTION INTRAMUSCULAR; INTRAVENOUS at 09:49

## 2023-03-23 RX ADMIN — SODIUM CHLORIDE 500 ML: 0.9 INJECTION, SOLUTION INTRAVENOUS at 12:43

## 2023-03-23 RX ADMIN — SODIUM CHLORIDE 500 ML: 0.9 INJECTION, SOLUTION INTRAVENOUS at 08:44

## 2023-03-23 NOTE — TELEPHONE ENCOUNTER
Patient present at time of call  His sister reports that he has been receiving chemotherapy all week and hs hands and feet are filling up with fluid  She would like a call back ASAP to advise  Reason for Disposition  • SEVERE hand swelling (e g , swelling of entire hand and up into forearm)    Answer Assessment - Initial Assessment Questions  1  ONSET: "When did the swelling start?" (e g , minutes, hours, days)      Last couple days, worse today   2  LOCATION: "What part of the hand is swollen?"  "Are both hands swollen or just one hand?"      Bilateral hands and feet   3   SEVERITY: "How bad is the swelling?" (e g , localized; mild, moderate, severe)    - BALL OR LUMP: small ball or lump    - LOCALIZED: puffy or swollen area or patch of skin    - JOINT SWELLING: swelling of a joint    - MILD: puffiness or mild swelling of fingers or hand    - MODERATE: fingers and hand are swollen    - SEVERE: swelling of entire hand and up into forearm      Moderate-severe    Protocols used: HAND [facility]

## 2023-03-23 NOTE — PROGRESS NOTES
Pt tolerated day 4 cisplatin/etoposide without complications   confirmed appt back tomorrow, port remains accessed

## 2023-03-23 NOTE — TELEPHONE ENCOUNTER
Per Dr Brigette Pemberton, 20 IV lasix to be given post treatment tomorrow  Spoke with spouse notified her of this  Advised her pt needs to proceed to ED with any worsening SOB   She was agreeable to the plan

## 2023-03-23 NOTE — TELEPHONE ENCOUNTER
Called and spoke with patient and sister  Reports seems like he retaining water  Advised to reach out to oncologist to determine if from chemo or something else  Sister states understanding and will call

## 2023-03-23 NOTE — TELEPHONE ENCOUNTER
Call Transfer   Reason for patient call? Patients sister Nella Gaucher calling to report that patient is experiencing swelling in his hands and feet  He states that he is having pain in his hands as well  If someone other than patient is calling, are they listed on the communication consent? No - Patient gave verbal consent    Who is the patients Primary Oncologist? Dr Kevin Swift    Person/Department that the call was transferred to? Time that call was transferred? Dusty Montes @ 2:56PM   Informed patient that the message will be forwarded to the team and someone will get back to them as soon as possible  Did you relay this information to the patient?  Yes

## 2023-03-23 NOTE — TELEPHONE ENCOUNTER
Spoke with patient and spouse in background  Pt reports B/L hand and feet swelling  Pt reports it is pitting  No improvement with Lasix yesterday or elevation of extremities  SOB which has been ongoing, not worse than baseline  Stated I will review with Dr Zoë Maldonado and call them back

## 2023-03-23 NOTE — PROGRESS NOTES
Reported to Scott Melgar RN pt only gained one pound from yesterday but did not urinate much last night as stated by patient  Patient otherwise asymptomatic today  Per Dr Shayy Chaney, will treat with Lasix prn and to monitor weight and symptoms and notify if any changes

## 2023-03-24 ENCOUNTER — HOSPITAL ENCOUNTER (OUTPATIENT)
Dept: INFUSION CENTER | Facility: HOSPITAL | Age: 38
End: 2023-03-24
Attending: INTERNAL MEDICINE

## 2023-03-24 VITALS
HEART RATE: 68 BPM | HEIGHT: 76 IN | OXYGEN SATURATION: 99 % | DIASTOLIC BLOOD PRESSURE: 85 MMHG | TEMPERATURE: 97.5 F | SYSTOLIC BLOOD PRESSURE: 124 MMHG | BODY MASS INDEX: 38.36 KG/M2 | RESPIRATION RATE: 18 BRPM | WEIGHT: 315 LBS

## 2023-03-24 DIAGNOSIS — C62.92 SEMINOMA OF LEFT TESTIS (HCC): ICD-10-CM

## 2023-03-24 DIAGNOSIS — T45.1X5A CHEMOTHERAPY INDUCED NEUTROPENIA (HCC): Primary | ICD-10-CM

## 2023-03-24 DIAGNOSIS — D70.1 CHEMOTHERAPY INDUCED NEUTROPENIA (HCC): Primary | ICD-10-CM

## 2023-03-24 RX ORDER — SODIUM CHLORIDE 9 MG/ML
20 INJECTION, SOLUTION INTRAVENOUS ONCE
Status: COMPLETED | OUTPATIENT
Start: 2023-03-24 | End: 2023-03-24

## 2023-03-24 RX ORDER — FUROSEMIDE 10 MG/ML
20 INJECTION INTRAMUSCULAR; INTRAVENOUS ONCE
Status: COMPLETED | OUTPATIENT
Start: 2023-03-24 | End: 2023-03-24

## 2023-03-24 RX ADMIN — CISPLATIN 54 MG: 1 INJECTION INTRAVENOUS at 09:48

## 2023-03-24 RX ADMIN — SODIUM CHLORIDE 20 ML/HR: 0.9 INJECTION, SOLUTION INTRAVENOUS at 08:36

## 2023-03-24 RX ADMIN — SODIUM CHLORIDE 500 ML: 0.9 INJECTION, SOLUTION INTRAVENOUS at 12:36

## 2023-03-24 RX ADMIN — ETOPOSIDE 270 MG: 20 INJECTION INTRAVENOUS at 10:57

## 2023-03-24 RX ADMIN — SODIUM CHLORIDE 500 ML: 0.9 INJECTION, SOLUTION INTRAVENOUS at 08:36

## 2023-03-24 RX ADMIN — DEXAMETHASONE SODIUM PHOSPHATE: 10 INJECTION, SOLUTION INTRAMUSCULAR; INTRAVENOUS at 08:35

## 2023-03-24 RX ADMIN — FUROSEMIDE 20 MG: 10 INJECTION, SOLUTION INTRAVENOUS at 08:29

## 2023-03-24 NOTE — PROGRESS NOTES
Upon assessment on arrival, pt hands don't appear swollen, legs slightly swollen, nonpitting  Pt stated he didn't urinate much last night  Pt states his breathing keeps him up at night and doesn't sleep well  Notified Jayson Mendes RN  Lasix given pretreatment, ok per Dr Umberto Patino  Pt tolerated day 5 cisplatin/etoposide/lasix/pre/post hydration, aware to return to [facility] tomorrow for JARROD tadeo provided

## 2023-03-25 ENCOUNTER — HOSPITAL ENCOUNTER (OUTPATIENT)
Dept: INFUSION CENTER | Facility: CLINIC | Age: 38
Discharge: HOME | End: 2023-03-25

## 2023-03-25 DIAGNOSIS — D70.1 CHEMOTHERAPY INDUCED NEUTROPENIA (HCC): Primary | ICD-10-CM

## 2023-03-25 DIAGNOSIS — T45.1X5A CHEMOTHERAPY INDUCED NEUTROPENIA (HCC): Primary | ICD-10-CM

## 2023-03-25 DIAGNOSIS — C62.92 SEMINOMA OF LEFT TESTIS (HCC): ICD-10-CM

## 2023-03-25 RX ADMIN — PEGFILGRASTIM-BMEZ 6 MG: 6 INJECTION SUBCUTANEOUS at 11:52

## 2023-03-25 NOTE — PROGRESS NOTES
Patient to infusion for Ziextenzo injection  He offers no complaints  Injection given in Left arm without incident    He declined AVS

## 2023-04-02 ENCOUNTER — HOSPITAL ENCOUNTER (EMERGENCY)
Facility: HOSPITAL | Age: 38
Discharge: HOME | End: 2023-04-02
Attending: EMERGENCY MEDICINE

## 2023-04-02 ENCOUNTER — APPOINTMENT (EMERGENCY)
Dept: RADIOLOGY | Facility: HOSPITAL | Age: 38
End: 2023-04-02

## 2023-04-02 VITALS
SYSTOLIC BLOOD PRESSURE: 120 MMHG | WEIGHT: 314.16 LBS | DIASTOLIC BLOOD PRESSURE: 76 MMHG | OXYGEN SATURATION: 98 % | RESPIRATION RATE: 22 BRPM | BODY MASS INDEX: 38.26 KG/M2 | HEART RATE: 82 BPM | TEMPERATURE: 97.6 F | HEIGHT: 76 IN

## 2023-04-02 DIAGNOSIS — M89.8X9 BONE PAIN DUE TO GRANULOCYTE COLONY STIMULATING FACTOR: Primary | ICD-10-CM

## 2023-04-02 DIAGNOSIS — Z95.828 PORT-A-CATH IN PLACE: ICD-10-CM

## 2023-04-02 LAB
ALBUMIN SERPL BCP-MCNC: 3.7 G/DL (ref 3.5–5)
ALP SERPL-CCNC: 130 U/L (ref 34–104)
ALT SERPL W P-5'-P-CCNC: 71 U/L (ref 7–52)
ANION GAP SERPL CALCULATED.3IONS-SCNC: 9 MMOL/L (ref 4–13)
AST SERPL W P-5'-P-CCNC: 25 U/L (ref 13–39)
BASOPHILS # BLD MANUAL: 0.08 THOUSAND/UL (ref 0–0.1)
BASOPHILS NFR MAR MANUAL: 2 % (ref 0–1)
BILIRUB DIRECT SERPL-MCNC: 0.17 MG/DL (ref 0–0.2)
BILIRUB SERPL-MCNC: 0.44 MG/DL (ref 0.2–1)
BUN SERPL-MCNC: 19 MG/DL (ref 5–25)
CALCIUM SERPL-MCNC: 9 MG/DL (ref 8.4–10.2)
CHLORIDE SERPL-SCNC: 91 MMOL/L (ref 96–108)
CK SERPL-CCNC: 42 U/L (ref 39–308)
CO2 SERPL-SCNC: 30 MMOL/L (ref 21–32)
CREAT SERPL-MCNC: 0.86 MG/DL (ref 0.6–1.3)
DOHLE BOD BLD QL SMEAR: PRESENT
EOSINOPHIL # BLD MANUAL: 0 THOUSAND/UL (ref 0–0.4)
EOSINOPHIL NFR BLD MANUAL: 0 % (ref 0–6)
ERYTHROCYTE [DISTWIDTH] IN BLOOD BY AUTOMATED COUNT: 12.6 % (ref 11.6–15.1)
GFR SERPL CREATININE-BSD FRML MDRD: 112 ML/MIN/1.73SQ M
GLUCOSE SERPL-MCNC: 92 MG/DL (ref 65–140)
HCT VFR BLD AUTO: 34.6 % (ref 36.5–49.3)
HGB BLD-MCNC: 12.6 G/DL (ref 12–17)
LYMPHOCYTES # BLD AUTO: 1.57 THOUSAND/UL (ref 0.6–4.47)
LYMPHOCYTES # BLD AUTO: 38 % (ref 14–44)
MAGNESIUM SERPL-MCNC: 1.6 MG/DL (ref 1.9–2.7)
MCH RBC QN AUTO: 33.2 PG (ref 26.8–34.3)
MCHC RBC AUTO-ENTMCNC: 36.4 G/DL (ref 31.4–37.4)
MCV RBC AUTO: 91 FL (ref 82–98)
METAMYELOCYTES NFR BLD MANUAL: 2 % (ref 0–1)
MONOCYTES # BLD AUTO: 0.58 THOUSAND/UL (ref 0–1.22)
MONOCYTES NFR BLD: 14 % (ref 4–12)
MYELOCYTES NFR BLD MANUAL: 1 % (ref 0–1)
NEUTROPHILS # BLD MANUAL: 1.61 THOUSAND/UL (ref 1.85–7.62)
NEUTS BAND NFR BLD MANUAL: 24 % (ref 0–8)
NEUTS SEG NFR BLD AUTO: 15 % (ref 43–75)
PHOSPHATE SERPL-MCNC: 2.5 MG/DL (ref 2.7–4.5)
PLATELET # BLD AUTO: 55 THOUSANDS/UL (ref 149–390)
PLATELET BLD QL SMEAR: ABNORMAL
PMV BLD AUTO: 10.2 FL (ref 8.9–12.7)
POTASSIUM SERPL-SCNC: 2.9 MMOL/L (ref 3.5–5.3)
PROT SERPL-MCNC: 6.5 G/DL (ref 6.4–8.4)
RBC # BLD AUTO: 3.79 MILLION/UL (ref 3.88–5.62)
RBC MORPH BLD: NORMAL
SODIUM SERPL-SCNC: 130 MMOL/L (ref 135–147)
VARIANT LYMPHS # BLD AUTO: 4 %
WBC # BLD AUTO: 4.14 THOUSAND/UL (ref 4.31–10.16)

## 2023-04-02 RX ORDER — NAPROXEN 500 MG/1
500 TABLET ORAL 2 TIMES DAILY WITH MEALS
Qty: 30 TABLET | Refills: 0 | Status: SHIPPED | OUTPATIENT
Start: 2023-04-02

## 2023-04-02 RX ORDER — POTASSIUM CHLORIDE 20 MEQ/1
40 TABLET, EXTENDED RELEASE ORAL ONCE
Status: COMPLETED | OUTPATIENT
Start: 2023-04-02 | End: 2023-04-02

## 2023-04-02 RX ORDER — MAGNESIUM SULFATE HEPTAHYDRATE 40 MG/ML
2 INJECTION, SOLUTION INTRAVENOUS ONCE
Status: COMPLETED | OUTPATIENT
Start: 2023-04-02 | End: 2023-04-02

## 2023-04-02 RX ORDER — NAPROXEN 250 MG/1
500 TABLET ORAL ONCE
Status: COMPLETED | OUTPATIENT
Start: 2023-04-02 | End: 2023-04-02

## 2023-04-02 RX ADMIN — POTASSIUM CHLORIDE 40 MEQ: 1500 TABLET, EXTENDED RELEASE ORAL at 14:44

## 2023-04-02 RX ADMIN — NAPROXEN 500 MG: 250 TABLET ORAL at 15:11

## 2023-04-02 RX ADMIN — MAGNESIUM SULFATE HEPTAHYDRATE 2 G: 40 INJECTION, SOLUTION INTRAVENOUS at 14:42

## 2023-04-02 NOTE — DISCHARGE INSTRUCTIONS
We suspect that the pain in your knees is bone pain caused by the medication you have been receiving to help your white blood cell count during chemotherapy  We have prescribed naproxen which she can take twice a day for pain  If this does not work your oncologist may recommend steroids instead  We recommend discussing this with your oncologist at your upcoming visit

## 2023-04-02 NOTE — ED NOTES
Pt having periods of sleep apnea  Wife indicated that a sleep study has been ordered just has not gotten completed yet        Keren Pacheco, RN  04/02/23 [id number]

## 2023-04-03 ENCOUNTER — TELEPHONE (OUTPATIENT)
Dept: SURGICAL ONCOLOGY | Facility: CLINIC | Age: 38
End: 2023-04-03

## 2023-04-03 ENCOUNTER — OFFICE VISIT (OUTPATIENT)
Dept: HEMATOLOGY ONCOLOGY | Facility: CLINIC | Age: 38
End: 2023-04-03

## 2023-04-03 VITALS
HEART RATE: 81 BPM | OXYGEN SATURATION: 91 % | WEIGHT: 312 LBS | SYSTOLIC BLOOD PRESSURE: 125 MMHG | HEIGHT: 76 IN | BODY MASS INDEX: 37.99 KG/M2 | RESPIRATION RATE: 18 BRPM | DIASTOLIC BLOOD PRESSURE: 80 MMHG

## 2023-04-03 DIAGNOSIS — C62.92 SEMINOMA OF LEFT TESTIS (HCC): ICD-10-CM

## 2023-04-03 DIAGNOSIS — C62.92 SEMINOMA OF LEFT TESTIS (HCC): Primary | ICD-10-CM

## 2023-04-03 DIAGNOSIS — T45.1X5A CHEMOTHERAPY INDUCED NEUTROPENIA: Primary | ICD-10-CM

## 2023-04-03 DIAGNOSIS — D70.1 CHEMOTHERAPY INDUCED NEUTROPENIA: Primary | ICD-10-CM

## 2023-04-03 RX ORDER — LIDOCAINE AND PRILOCAINE 25; 25 MG/G; MG/G
CREAM TOPICAL
Qty: 30 G | Refills: 0 | Status: SHIPPED | OUTPATIENT
Start: 2023-04-03

## 2023-04-03 NOTE — PROGRESS NOTES
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 aortocaval lymphadenopathy measuring 2 2 cm retrocaval lymphadenopathy measuring 1 6 cm and left iliac lymphadenopathy status post right orchiectomy on 12/2022 stage I (T1b, NX, MX, S0) pure seminoma unifocal primary 4 5 cm and negative margins no lymphovascular invasion, lymphadenopathy in the retroperitoneal area repeat CAT scan in February 2023 showed stable however enlarged lymph node     This is stage II     Now with mild elevation of LDH which is more consistent with metastatic and aggressiveness of the disease     Repeat biopsy of the lymph node showed no evidence of viable tumor however a lot of necrosis with could be seen in seminoma    He received EP the fifth cycle on the last week of March 2028 complicated with a grade 3 painful neuropathy of the hip  and in the fingers    His hemoglobin A1c is normal    At this time with grade 3 neuropathy I will hold cisplatin and we will continue with etoposide    Decrease hydration assigned to cisplatin plan    We will follow-up in 3 weeks with alpha-fetoprotein, CBC, CMP, LDH and hCG        Labs and imaging studies are reviewed by ordering provider once results are available  If there are findings that need immediate attention, you will be contacted when results available  Discussing results and the implication on your healthcare is best discussed in person at your follow-up visit         HPI:    22-year-old  male with history of hypertension, morbid obesity, periumbilical hernia, fatty liver, fluid retention, who felt a mass in the left testicle, on 6/18/2021 he was seen in the emergency room, ultrasound showed heterogeneous mass suspicious for malignancy referred for urology however he did not show up     According to the patient this mass has been growing up slowly, requesting evaluation by urology on 12/2022, CT scan of the abdomen and pelvis showed abnormal appearing retroperitoneal lymph nodes for example aortocaval lymph node measuring 2 2 x 2 2 cm, retrocaval lymph node measuring 1 6 x 1 5 cm, enlarged left iliac lymph node measuring 2 x 2 centimeter, fat stranding on the right anterior common/external iliac artery     Ultrasound of the scrotum showed left testicular nodules consistent with neoplasm and a small hydrocele the largest measuring 2 3 x 1 8 x 2 cm in the upper pole     Alpha-fetoprotein, hCG, LDH were normal before the surgery     Status post radical left orchiectomy on 12/14/2022           Final Diagnosis   A  Left testicle and spermatic cord, radical orchiectomy:  -Seminoma, 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   -Largest viable tumor focus measures 0 9 cm in greatest dimension    -All margins negative for carcinoma   -Definitive lymph-vascular invasion not identified, see note  -Extensive necrosis and fibrosis present  -SEE SYNOPTIC REPORT     Comment: Tumor cells are positive for OCT3/4 and  and negative for CD45, CK-AE1/AE3, CD30, glypican-3, AFP, Beta-HCG, and inhibin   Electronically signed by Jaron Chase DO on 12/21/2022 at  3:04 PM   Note     Small focus of tumor is present within lymphvascular space without surrounding fibrin or clot   This is favored to represent carryover               CLINICAL   Pre-Orchiectomy Serum Tumor Markers   Serum marker studies within normal limits    Post-Orchiectomy Serum Tumor Markers   Unknown    Serum Tumor Markers (S)   S0 (serum marker study levels within normal limits)    SPECIMEN   Specimen Laterality   Left    TUMOR   Tumor Focality   Unifocal    Tumor Size   Greatest dimension of main tumor mass (Centimeters): 4 5 cm   Histologic Type   Seminoma    Tumor Extent   Limited to testis    Lymphovascular Invasion   Not identified    MARGINS   Margin Status   All margins negative for tumor    REGIONAL LYMPH NODES   Regional Lymph Node Status   Not applicable (no regional lymph nodes submitted or found)       Stage I (pT1b, PN X, MX, S 0)     He smokes 1 pack of cigarette daily for many years, he has 3 children, his father  with lung cancer     He reported severe acid reflux with tenderness in the epigastrium area denied any headache blurred vision chest pain he reported dyspnea on exertion abdominal pain denied any dysuria hematuria melena hematochezia or change in bowel habits     He does not drink alcohol  Repeat CT scan of the abdomen and pelvis on 2/15/2023 showed multiple enlarged para-aortic/aortocaval lymphadenopathy the largest 2 3 x 1 8 cm did not change compared to the previous CAT scan     LDH increasing to 250 (199 on 2022     Normal hCG and alpha-fetoprotein      Interval History:        Previous Treatment:         Test Results:    Imaging: IR port placement    Result Date: 3/14/2023  Narrative: INDICATION: Seminoma  PROCEDURE: 1  Internal jugular approach port placement FINDINGS: 1  Single lumen port placed via right internal jugular vein with tip in the right atrium  Impression: Port placement  The catheter is ready for use  _______________________________________________________________ COMPARISON: None PROCEDURE DETAILS: Operators: Dr Dontae Phan Anesthesia: Moderate sedation was provided for 30 minutes  Hemodynamic parameters were continuously monitored by IR nursing  Local anesthesia  Medications: 1% lidocaine, fentanyl, Versed Contrast: 0 mL of Omnipaque 300 Fluoroscopy time: 1 9 minutes Images: 2 COMMENTS: The site was prepped and draped in the usual sterile fashion  All elements of maximal sterile barrier technique were followed (cap, mask, sterile gown, sterile gloves, large sterile full-body drape, hand hygiene, and 2% chlorhexidine for cutaneous antisepsis)   Sterile ultrasound technique with sterile gel and sterile probe cover was also utilized  A preprocedure timeout was performed per [facility] protocol  Patent right internal jugular vein was compressible and accessed using a micropuncture needle using real-time ultrasound guidance  Static ultrasound image was saved to PACS  Over a microwire, the needle was exchanged for a micropuncture sheath followed  by exchange for a J-wire advanced into the inferior vena cava  Next, a transverse incision was made over the upper chest wall and a subcutaneous pocket was created using blunt dissection  Following catheter tunneling, the micropuncture sheath was exchanged for a peel-away sheath over a wire allowing catheter advancement into the right atrium using fluoroscopic guidance  Peel-away sheath was then removed  Following catheter length confirmation and position with fluoroscopy, catheter was cut, connected to the port hub and accessed using a noncoring Casey needle for aspiration and flushing  The port was placed into the subcutaneous pocket  The pocket was closed in layers with 3-0 interrupted Vicryl sutures and subcuticular continuous sutures using a Stratafix absorbable suture  3-0 Vicryl suture of the venotomy was performed  Exofin glue was applied over the venotomy and chest incisions  Workstation performed: [id number]       Labs:   Lab Results   Component Value Date    WBC 4 14 (L) 04/02/2023    HGB 12 6 04/02/2023    HCT 34 6 (L) 04/02/2023    MCV 91 04/02/2023    PLT 55 (L) 04/02/2023     Lab Results   Component Value Date    K 2 9 (L) 04/02/2023    CL 91 (L) 04/02/2023    CO2 30 04/02/2023    BUN 19 04/02/2023    CREATININE 0 86 04/02/2023    CALCIUM 9 0 04/02/2023    AST 25 04/02/2023    ALT 71 (H) 04/02/2023    ALKPHOS 130 (H) 04/02/2023    EGFR 112 04/02/2023       No results found for: IRON, TIBC, FERRITIN    Lab Results   Component Value Date    [id number] 942 07/19/2022         ROS: Review of Systems   Constitutional: Positive for appetite change and unexpected weight change  Negative for chills, diaphoresis, fatigue and fever  HENT:   Negative for hearing loss, lump/mass, mouth sores, nosebleeds, sore throat, trouble swallowing and voice change  Eyes: Negative  Negative for eye problems and icterus  Respiratory: Negative  Negative for chest tightness, cough, hemoptysis and shortness of breath  Cardiovascular: Negative for chest pain and leg swelling  Gastrointestinal: Negative for abdominal distention, abdominal pain, blood in stool, constipation, diarrhea and nausea  Endocrine: Negative  Genitourinary: Negative for dysuria, frequency, hematuria and pelvic pain  Musculoskeletal: Positive for gait problem  Negative for arthralgias, back pain, flank pain, myalgias and neck stiffness  Skin: Negative for itching and rash  Neurological: Positive for gait problem, numbness and speech difficulty  Negative for dizziness, headaches and light-headedness  Hematological: Negative for adenopathy  Does not bruise/bleed easily  Psychiatric/Behavioral: Negative for confusion, decreased concentration, depression and sleep disturbance  The patient is not nervous/anxious  Current Medications: Reviewed  Allergies: Reviewed  PMH/FH/SH:  Reviewed      Physical Exam:    Body surface area is 2 68 meters squared  Wt Readings from Last 3 Encounters:   04/03/23 (!) 142 kg (312 lb)   04/02/23 (!) 143 kg (314 lb 2 5 oz)   03/24/23 (!) 149 kg (328 lb 14 8 oz)        Temp Readings from Last 3 Encounters:   04/02/23 97 6 °F (36 4 °C) (Oral)   03/24/23 97 5 °F (36 4 °C) (Temporal)   03/23/23 (!) 97 1 °F (36 2 °C) (Temporal)        BP Readings from Last 3 Encounters:   04/03/23 125/80   04/02/23 120/76   03/24/23 124/85         Pulse Readings from Last 3 Encounters:   04/03/23 81   04/02/23 82   03/24/23 68        Physical Exam  Vitals reviewed  Constitutional:       General: He is not in acute distress  Appearance: He is well-developed  He is not diaphoretic     HENT: Head: Normocephalic and atraumatic  Eyes:      Conjunctiva/sclera: Conjunctivae normal    Neck:      Trachea: No tracheal deviation  Cardiovascular:      Rate and Rhythm: Normal rate and regular rhythm  Heart sounds: No murmur heard  No friction rub  No gallop  Pulmonary:      Effort: Pulmonary effort is normal  No respiratory distress  Breath sounds: Normal breath sounds  No wheezing or rales  Chest:      Chest wall: No tenderness  Abdominal:      General: There is no distension  Palpations: Abdomen is soft  Tenderness: There is no abdominal tenderness  Musculoskeletal:      Cervical back: Normal range of motion and neck supple  Right lower leg: Edema present  Left lower leg: Edema present  Lymphadenopathy:      Cervical: No cervical adenopathy  Skin:     General: Skin is warm and dry  Coloration: Skin is not pale  Findings: No erythema  Neurological:      Mental Status: He is alert and oriented to person, place, and time  Sensory: Sensory deficit present  Motor: Weakness present  Coordination: Coordination abnormal       Gait: Gait abnormal    Psychiatric:         Behavior: Behavior normal          Thought Content: Thought content normal          Judgment: Judgment normal          ECO  Goals and Barriers:  Current Goal: Minimize effects of disease  Barriers: None  Patient's Capacity to Self Care:  Patient is able to self care      Code Status: [unfilled]

## 2023-04-03 NOTE — TELEPHONE ENCOUNTER
Left message letting patient know that some appointment times changed, he can view them on his mychart and if he has any questions to contact me  Will try calling again this afternoon

## 2023-04-04 DIAGNOSIS — C62.92 SEMINOMA OF LEFT TESTIS (HCC): Primary | ICD-10-CM

## 2023-04-04 RX ORDER — SODIUM CHLORIDE 9 MG/ML
20 INJECTION, SOLUTION INTRAVENOUS ONCE
Status: CANCELLED | OUTPATIENT
Start: 2023-04-10

## 2023-04-04 RX ORDER — SODIUM CHLORIDE 9 MG/ML
20 INJECTION, SOLUTION INTRAVENOUS ONCE
Status: CANCELLED | OUTPATIENT
Start: 2023-04-12

## 2023-04-04 RX ORDER — MAGNESIUM SULFATE HEPTAHYDRATE 40 MG/ML
2 INJECTION, SOLUTION INTRAVENOUS ONCE
Status: CANCELLED
Start: 2023-04-13 | End: 2023-04-13

## 2023-04-04 RX ORDER — MAGNESIUM SULFATE HEPTAHYDRATE 40 MG/ML
2 INJECTION, SOLUTION INTRAVENOUS ONCE
Status: CANCELLED
Start: 2023-04-11 | End: 2023-04-11

## 2023-04-04 RX ORDER — SODIUM CHLORIDE 9 MG/ML
20 INJECTION, SOLUTION INTRAVENOUS ONCE
Status: CANCELLED | OUTPATIENT
Start: 2023-04-14

## 2023-04-04 RX ORDER — SODIUM CHLORIDE 9 MG/ML
20 INJECTION, SOLUTION INTRAVENOUS ONCE
Status: CANCELLED | OUTPATIENT
Start: 2023-04-11

## 2023-04-04 RX ORDER — SODIUM CHLORIDE 9 MG/ML
20 INJECTION, SOLUTION INTRAVENOUS ONCE
Status: CANCELLED | OUTPATIENT
Start: 2023-04-13

## 2023-04-09 NOTE — ED PROVIDER NOTES
History  Chief Complaint   Patient presents with   • Knee Pain     Reports B/L knee pain since yesterday, concerned because hx testicular cancer with lymph involvement, also reports significant weight gain 130lbs since December     30year-old male currently undergoing chemotherapy for testicular cancer presents with atraumatic bilateral knee pain  He reports that the pain in his knees and lower extremities in general has been increasing for the last month but is now make it difficult for him to walk  No numbness or weakness  Tolerating p o  no significant leg swelling    Prior to Admission Medications   Prescriptions Last Dose Informant Patient Reported? Taking?    FLUoxetine (PROzac) 10 mg capsule   No No   Sig: Take 1 capsule (10 mg total) by mouth daily   QUEtiapine (SEROquel) 25 mg tablet   No No   Sig: Take 1 tablet (25 mg total) by mouth daily at bedtime   albuterol (ProAir HFA) 90 mcg/act inhaler   No No   Sig: Inhale 2 puffs every 6 (six) hours as needed for wheezing   amLODIPine (NORVASC) 10 mg tablet   No No   Sig: Take 1 tablet (10 mg total) by mouth daily   amLODIPine (NORVASC) 10 mg tablet   No No   Sig: Take 1 tablet (10 mg total) by mouth daily   docusate sodium (COLACE) 100 mg capsule   No No   Sig: Take 1 capsule (100 mg total) by mouth 2 (two) times a day for 15 days   losartan (COZAAR) 50 mg tablet   No No   Sig: Take 1 tablet (50 mg total) by mouth daily   losartan (COZAAR) 50 mg tablet   No No   Sig: Take 1 tablet (50 mg total) by mouth daily   ondansetron (ZOFRAN-ODT) 4 mg disintegrating tablet   No No   Sig: Take 1 tablet (4 mg total) by mouth every 6 (six) hours as needed for nausea or vomiting   sodium chloride 1 g tablet   No No   Sig: Take 2 tablets (2 g total) by mouth 3 (three) times a day with meals   tamsulosin (FLOMAX) 0 4 mg   No No   Sig: Take 1 capsule (0 4 mg total) by mouth daily with dinner   torsemide (DEMADEX) 10 mg tablet   No No   Sig: Take 1 tablet (10 mg total) by mouth daily   torsemide (DEMADEX) 10 mg tablet   No No   Sig: Take 1 tablet (10 mg total) by mouth daily      Facility-Administered Medications: None       Past Medical History:   Diagnosis Date   • Anxiety    • Cancer [facility])    • Depression    • Psychiatric disorder     bipolar       Past Surgical History:   Procedure Laterality Date   • IR BIOPSY LYMPH NODE  2023   • IR PORT PLACEMENT  3/14/2023   • ORCHIECTOMY Left 2022    Procedure: ORCHIECTOMY INGUINAL;  Surgeon: Nico Franco MD;  Location: [facility];  Service: Urology       History reviewed  No pertinent family history  I have reviewed and agree with the history as documented  E-Cigarette/Vaping   • E-Cigarette Use Never User      E-Cigarette/Vaping Substances     Social History     Tobacco Use   • Smoking status: Every Day     Packs/day: 0 25     Types: Cigarettes     Start date: 2008     Last attempt to quit: 2022     Years since quittin 3   • Smokeless tobacco: Never   Vaping Use   • Vaping Use: Never used   Substance Use Topics   • Alcohol use: Not Currently     Comment: 2 cases of beer daily, stopped 3 years ago   • Drug use: Yes     Types: Marijuana     Comment: daily last smoked 3/13/23       Review of Systems   Constitutional: Negative for fever  Gastrointestinal: Negative for diarrhea and vomiting  Musculoskeletal: Positive for arthralgias  Negative for joint swelling and myalgias  Physical Exam  Physical Exam  Constitutional:       General: He is not in acute distress  Appearance: Normal appearance  HENT:      Head: Normocephalic  Mouth/Throat:      Mouth: Mucous membranes are moist    Eyes:      Conjunctiva/sclera: Conjunctivae normal    Cardiovascular:      Rate and Rhythm: Normal rate  Pulmonary:      Effort: Pulmonary effort is normal    Musculoskeletal:         General: Normal range of motion  Right lower leg: No edema  Left lower leg: No edema     Skin:     General: Skin is warm and dry  Comments: Bilateral knees without erythema, generalized tenderness to palpation knees and thigh muscles and calves, no joint swelling, no deformity   Neurological:      General: No focal deficit present  Mental Status: He is oriented to person, place, and time           Vital Signs  ED Triage Vitals [04/02/23 1255]   Temperature Pulse Respirations Blood Pressure SpO2   97 6 °F (36 4 °C) 85 20 125/74 99 %      Temp Source Heart Rate Source Patient Position - Orthostatic VS BP Location FiO2 (%)   Oral Monitor Sitting Left arm --      Pain Score       10 - Worst Possible Pain           Vitals:    04/02/23 1255 04/02/23 1446   BP: 125/74 120/76   Pulse: 85 82   Patient Position - Orthostatic VS: Sitting Sitting         Visual Acuity      ED Medications  Medications   magnesium sulfate 2 g/50 mL IVPB (premix) 2 g (0 g Intravenous Stopped 4/2/23 1512)   potassium chloride (K-DUR,KLOR-CON) CR tablet 40 mEq (40 mEq Oral Given 4/2/23 1444)   naproxen (NAPROSYN) tablet 500 mg (500 mg Oral Given 4/2/23 1511)       Diagnostic Studies  Results Reviewed     Procedure Component Value Units Date/Time    CBC and differential [[id number]]  (Abnormal) Collected: 04/02/23 1322    Lab Status: Final result Specimen: Blood from Arm, Left Updated: 04/02/23 1448     WBC 4 14 Thousand/uL      RBC 3 79 Million/uL      Hemoglobin 12 6 g/dL      Hematocrit 34 6 %      MCV 91 fL      MCH 33 2 pg      MCHC 36 4 g/dL      RDW 12 6 %      MPV 10 2 fL      Platelets 55 Thousands/uL     Manual Differential(PHLEBS Do Not Order) [[id number]]  (Abnormal) Collected: 04/02/23 1322    Lab Status: Final result Specimen: Blood from Arm, Left Updated: 04/02/23 1448     Segmented % 15 %      Bands % 24 %      Lymphocytes % 38 %      Monocytes % 14 %      Eosinophils, % 0 %      Basophils % 2 %      Metamyelocytes% 2 %      Myelocytes % 1 %      Atypical Lymphocytes % 4 %      Absolute Neutrophils 1 61 Thousand/uL      Lymphocytes Absolute 1 57 Thousand/uL      Monocytes Absolute 0 58 Thousand/uL      Eosinophils Absolute 0 00 Thousand/uL      Basophils Absolute 0 08 Thousand/uL      Total Counted --     Dohle Bodies Present     RBC Morphology Normal     Platelet Estimate Decreased    Basic metabolic panel [[id number]]  (Abnormal) Collected: 04/02/23 1322    Lab Status: Final result Specimen: Blood from Arm, Left Updated: 04/02/23 1352     Sodium 130 mmol/L      Potassium 2 9 mmol/L      Chloride 91 mmol/L      CO2 30 mmol/L      ANION GAP 9 mmol/L      BUN 19 mg/dL      Creatinine 0 86 mg/dL      Glucose 92 mg/dL      Calcium 9 0 mg/dL      eGFR 112 ml/min/1 73sq m     Narrative:      [place] guidelines for Chronic Kidney Disease (CKD):   •  Stage 1 with normal or high GFR (GFR > 90 mL/min/1 73 square meters)  •  Stage 2 Mild CKD (GFR = 60-89 mL/min/1 73 square meters)  •  Stage 3A Moderate CKD (GFR = 45-59 mL/min/1 73 square meters)  •  Stage 3B Moderate CKD (GFR = 30-44 mL/min/1 73 square meters)  •  Stage 4 Severe CKD (GFR = 15-29 mL/min/1 73 square meters)  •  Stage 5 End Stage CKD (GFR <15 mL/min/1 73 square meters)  Note: GFR calculation is accurate only with a steady state creatinine    Hepatic function panel [[id number]]  (Abnormal) Collected: 04/02/23 1322    Lab Status: Final result Specimen: Blood from Arm, Left Updated: 04/02/23 1352     Total Bilirubin 0 44 mg/dL      Bilirubin, Direct 0 17 mg/dL      Alkaline Phosphatase 130 U/L      AST 25 U/L      ALT 71 U/L      Total Protein 6 5 g/dL      Albumin 3 7 g/dL     Magnesium [[id number]]  (Abnormal) Collected: 04/02/23 1322    Lab Status: Final result Specimen: Blood from Arm, Left Updated: 04/02/23 1352     Magnesium 1 6 mg/dL     Phosphorus [[id number]]  (Abnormal) Collected: 04/02/23 1322    Lab Status: Final result Specimen: Blood from Arm, Left Updated: 04/02/23 1352     Phosphorus 2 5 mg/dL     CK [[id number]]  (Normal) Collected: 04/02/23 1322    Lab Status: Final result Specimen: Blood from Arm, Left Updated: 04/02/23 1352     Total CK 42 U/L                  XR knee 4+ views Right injury   Final Result by Mary Lindsay MD (04/03 7284)      No acute osseous abnormality  Workstation performed: [id number]         XR knee 4+ views left injury   Final Result by Mary Lindsay MD (04/03 6758)      No acute osseous abnormality  Workstation performed: [id number]                    Procedures  Procedures         ED Course     74-year-old male currently on chemotherapy for testicular cancer here with bilateral knee pain  Exam patient is in no acute distress and has normal vitals  His knees are not erythematous or swollen and range of motion is normal which rules out infectious or inflammatory arthritis  Patient also has tenderness of the rest of his legs  Screening labs obtained notable for hypokalemia and low magnesium  Both of these were repleted in the ED  Normal CK  Likely related to vomiting from chemo  X-rays of both knees were unremarkable  I discussed patient with the hematologist on-call who advised that this is likely bone pain related to the granulocyte colony simulating factor medication patient is getting to treat neutropenia  He advised treating with NSAIDs and if this is not helpful giving steroids  Patient was advised to treat with NSAIDs  He has follow-up with his oncologist in a few days to reassess                                          MDM    Disposition  Final diagnoses:   Bone pain due to granulocyte colony stimulating factor     Time reflects when diagnosis was documented in both MDM as applicable and the Disposition within this note     Time User Action Codes Description Comment    4/2/2023  3:31 PM Berto Hughes Add [V67 [id number]] Bone pain due to granulocyte colony stimulating factor       ED Disposition     ED Disposition   Discharge    Condition   Stable    Date/Time   Sun Apr 2, 2023  3:31 PM    Comment [facility] [address]  discharge to home/self care                 Follow-up Information     Follow up With Specialties Details Why Contact Info    Fish Wise MD Family Medicine  As needed [address]  [address] [address]  [phone]            Discharge Medication List as of 4/2/2023  3:34 PM      START taking these medications    Details   naproxen (Naprosyn) 500 mg tablet Take 1 tablet (500 mg total) by mouth 2 (two) times a day with meals, Starting Sun 4/2/2023, Normal         CONTINUE these medications which have NOT CHANGED    Details   albuterol (ProAir HFA) 90 mcg/act inhaler Inhale 2 puffs every 6 (six) hours as needed for wheezing, Starting Wed 12/14/2022, Normal      !! amLODIPine (NORVASC) 10 mg tablet Take 1 tablet (10 mg total) by mouth daily, Starting Thu 2/23/2023, Normal      !! amLODIPine (NORVASC) 10 mg tablet Take 1 tablet (10 mg total) by mouth daily, Starting Sat 2/25/2023, Normal      docusate sodium (COLACE) 100 mg capsule Take 1 capsule (100 mg total) by mouth 2 (two) times a day for 15 days, Starting Wed 12/14/2022, Until Wed 2/22/2023, Normal      FLUoxetine (PROzac) 10 mg capsule Take 1 capsule (10 mg total) by mouth daily, Starting Wed 2/22/2023, Normal      !! losartan (COZAAR) 50 mg tablet Take 1 tablet (50 mg total) by mouth daily, Starting Thu 2/23/2023, Normal      !! losartan (COZAAR) 50 mg tablet Take 1 tablet (50 mg total) by mouth daily, Starting Sat 2/25/2023, Normal      ondansetron (ZOFRAN-ODT) 4 mg disintegrating tablet Take 1 tablet (4 mg total) by mouth every 6 (six) hours as needed for nausea or vomiting, Starting Wed 3/8/2023, Normal      QUEtiapine (SEROquel) 25 mg tablet Take 1 tablet (25 mg total) by mouth daily at bedtime, Starting Wed 2/22/2023, Normal      sodium chloride 1 g tablet Take 2 tablets (2 g total) by mouth 3 (three) times a day with meals, Starting Wed 2/22/2023, Normal      tamsulosin (FLOMAX) 0 4 mg Take 1 capsule (0 4 mg total) by mouth daily with dinner, Starting Wed 12/14/2022, Normal      !! torsemide (DEMADEX) 10 mg tablet Take 1 tablet (10 mg total) by mouth daily, Starting Thu 2/23/2023, Normal      !! torsemide (DEMADEX) 10 mg tablet Take 1 tablet (10 mg total) by mouth daily, Starting Sat 2/25/2023, Normal      lidocaine-prilocaine (EMLA) cream Apply to port site 30 minutes prior to port accessing and cover with a dressing, Normal       !! - Potential duplicate medications found  Please discuss with provider  No discharge procedures on file      PDMP Review     None          ED Provider  Electronically Signed by           Vandana Gonzalez MD  04/09/23 7259

## 2023-04-14 RX ORDER — DIPHENHYDRAMINE HYDROCHLORIDE 50 MG/ML
25 INJECTION INTRAMUSCULAR; INTRAVENOUS ONCE
Status: CANCELLED
Start: 2023-04-14 | End: 2023-04-14

## 2023-04-20 PROBLEM — G93.89 CEREBRAL GLIOSIS: Status: ACTIVE | Noted: 2023-04-20

## 2023-04-20 PROBLEM — D33.3 UNILATERAL VESTIBULAR SCHWANNOMA (HCC): Status: ACTIVE | Noted: 2023-04-20

## 2023-04-24 ENCOUNTER — OFFICE VISIT (OUTPATIENT)
Dept: HEMATOLOGY ONCOLOGY | Facility: CLINIC | Age: 38
End: 2023-04-24

## 2023-04-24 VITALS
RESPIRATION RATE: 18 BRPM | BODY MASS INDEX: 38.36 KG/M2 | OXYGEN SATURATION: 94 % | HEIGHT: 76 IN | WEIGHT: 315 LBS | HEART RATE: 74 BPM | SYSTOLIC BLOOD PRESSURE: 120 MMHG | DIASTOLIC BLOOD PRESSURE: 75 MMHG

## 2023-04-24 DIAGNOSIS — T45.1X5A CHEMOTHERAPY INDUCED NEUTROPENIA (HCC): ICD-10-CM

## 2023-04-24 DIAGNOSIS — C62.92 SEMINOMA OF LEFT TESTIS (HCC): Primary | ICD-10-CM

## 2023-04-24 DIAGNOSIS — D70.1 CHEMOTHERAPY INDUCED NEUTROPENIA: ICD-10-CM

## 2023-04-24 DIAGNOSIS — T45.1X5A CHEMOTHERAPY INDUCED NEUTROPENIA: ICD-10-CM

## 2023-04-24 DIAGNOSIS — D70.1 CHEMOTHERAPY INDUCED NEUTROPENIA (HCC): ICD-10-CM

## 2023-04-24 PROBLEM — N50.89 TESTICULAR MASS: Status: RESOLVED | Noted: 2022-12-13 | Resolved: 2023-04-24

## 2023-04-24 PROBLEM — R06.02 SHORTNESS OF BREATH: Status: RESOLVED | Noted: 2022-12-10 | Resolved: 2023-04-24

## 2023-04-24 PROBLEM — E87.1 HYPONATREMIA: Status: RESOLVED | Noted: 2022-12-11 | Resolved: 2023-04-24

## 2023-04-24 RX ORDER — SODIUM CHLORIDE 9 MG/ML
20 INJECTION, SOLUTION INTRAVENOUS ONCE
Status: CANCELLED | OUTPATIENT
Start: 2023-05-02

## 2023-04-24 RX ORDER — SODIUM CHLORIDE 9 MG/ML
20 INJECTION, SOLUTION INTRAVENOUS ONCE
Status: CANCELLED | OUTPATIENT
Start: 2023-05-05

## 2023-04-24 RX ORDER — MAGNESIUM SULFATE HEPTAHYDRATE 40 MG/ML
2 INJECTION, SOLUTION INTRAVENOUS ONCE
Status: CANCELLED
Start: 2023-05-02 | End: 2023-05-02

## 2023-04-24 RX ORDER — MAGNESIUM SULFATE HEPTAHYDRATE 40 MG/ML
2 INJECTION, SOLUTION INTRAVENOUS ONCE
Status: CANCELLED
Start: 2023-05-04 | End: 2023-05-04

## 2023-04-24 RX ORDER — SODIUM CHLORIDE 9 MG/ML
20 INJECTION, SOLUTION INTRAVENOUS ONCE
Status: CANCELLED | OUTPATIENT
Start: 2023-05-03

## 2023-04-24 RX ORDER — SODIUM CHLORIDE 9 MG/ML
20 INJECTION, SOLUTION INTRAVENOUS ONCE
Status: CANCELLED | OUTPATIENT
Start: 2023-05-01

## 2023-04-24 NOTE — PROGRESS NOTES
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 measuring 2 2 cm retrocaval lymphadenopathy measuring 1 6 cm and left iliac lymphadenopathy status post right orchiectomy on 12/2022 stage I (T1b, NX, MX, S0) pure seminoma unifocal primary 4 5 cm and negative margins no lymphovascular invasion, lymphadenopathy in the retroperitoneal area repeat CAT scan in February 2023 showed stable however enlarged lymph node     This is stage II     Now with mild elevation of LDH which is more consistent with metastatic and aggressiveness of the disease     Repeat biopsy of the lymph node showed no evidence of viable tumor however a lot of necrosis with could be seen in seminoma     He received EP on March 2023, initially he reported tingling and numbness however now with the help of his daughter, and the patient said that he has no tingling or numbness in the feet or the hands, we will proceed with cisplatin/etoposide 20 mg per metered squared, 100 mg per metered square 5 days every 3 weeks    Pegfilgrastim to prevent chemotherapy-induced neutropenia    Hypomagnesemia replace magnesium        Labs and imaging studies are reviewed by ordering provider once results are available  If there are findings that need immediate attention, you will be contacted when results available  Discussing results and the implication on your healthcare is best discussed in person at your follow-up visit         HPI:    77-year-old  male with history of hypertension, morbid obesity, periumbilical hernia, fatty liver, fluid retention, who felt a mass in the left testicle, on 6/18/2021 he was seen in the emergency room, ultrasound showed heterogeneous mass suspicious for malignancy referred for urology however he did not show up     According to the patient this mass has been growing up slowly, requesting evaluation by urology on 12/2022, CT scan of the abdomen and pelvis showed abnormal appearing retroperitoneal lymph nodes for example aortocaval lymph node measuring 2 2 x 2 2 cm, retrocaval lymph node measuring 1 6 x 1 5 cm, enlarged left iliac lymph node measuring 2 x 2 centimeter, fat stranding on the right anterior common/external iliac artery     Ultrasound of the scrotum showed left testicular nodules consistent with neoplasm and a small hydrocele the largest measuring 2 3 x 1 8 x 2 cm in the upper pole     Alpha-fetoprotein, hCG, LDH were normal before the surgery     Status post radical left orchiectomy on 12/14/2022           Final Diagnosis   A  Left testicle and spermatic cord, radical orchiectomy:  -Seminoma, 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   -Largest viable tumor focus measures 0 9 cm in greatest dimension    -All margins negative for carcinoma   -Definitive lymph-vascular invasion not identified, see note  -Extensive necrosis and fibrosis present  -SEE SYNOPTIC REPORT     Comment: Tumor cells are positive for OCT3/4 and  and negative for CD45, CK-AE1/AE3, CD30, glypican-3, AFP, Beta-HCG, and inhibin   Electronically signed by Patric Huff DO on 12/21/2022 at  3:04 PM   Note     Small focus of tumor is present within lymphvascular space without surrounding fibrin or clot   This is favored to represent carryover               CLINICAL   Pre-Orchiectomy Serum Tumor Markers   Serum marker studies within normal limits    Post-Orchiectomy Serum Tumor Markers   Unknown    Serum Tumor Markers (S)   S0 (serum marker study levels within normal limits)    SPECIMEN   Specimen Laterality   Left    TUMOR   Tumor Focality   Unifocal    Tumor Size   Greatest dimension of main tumor mass (Centimeters): 4 5 cm   Histologic Type   Seminoma    Tumor Extent   Limited to testis    Lymphovascular Invasion   Not identified    MARGINS   Margin Status   All margins negative for tumor    REGIONAL LYMPH NODES   Regional Lymph Node Status   Not applicable (no regional lymph nodes submitted or found)       Stage I (pT1b, PN X, MX, S 0)     He smokes 1 pack of cigarette daily for many years, he has 3 children, his father  with lung cancer     He reported severe acid reflux with tenderness in the epigastrium area denied any headache blurred vision chest pain he reported dyspnea on exertion abdominal pain denied any dysuria hematuria melena hematochezia or change in bowel habits     He does not drink alcohol  Repeat CT scan of the abdomen and pelvis on 2/15/2023 showed multiple enlarged para-aortic/aortocaval lymphadenopathy the largest 2 3 x 1 8 cm did not change compared to the previous CAT scan     LDH increasing to 250 (199 on 2022     Normal hCG and alpha-fetoprotein      Interval History:    Very hard to communicate with the patient, however he said he has no neuropathy such as tingling or numbness    Previous Treatment:         Test Results:    Imaging: XR knee 4+ views left injury    Result Date: 4/3/2023  Narrative: LEFT KNEE INDICATION:   knee pain  COMPARISON:  10/13/2017 VIEWS:  XR KNEE 4+ VW LEFT INJURY FINDINGS: There is no acute fracture or dislocation  There is no joint effusion  No significant degenerative changes  No lytic or blastic osseous lesion  Soft tissues are unremarkable  Impression: No acute osseous abnormality  Workstation performed: [id number]     XR knee 4+ views Right injury    Result Date: 4/3/2023  Narrative: RIGHT KNEE INDICATION:   knee pain  COMPARISON:  None VIEWS:  XR KNEE 4+ VW RIGHT INJURY FINDINGS: There is no acute fracture or dislocation  There is no joint effusion  No significant degenerative changes  No lytic or blastic osseous lesion  Soft tissues are unremarkable  Impression: No acute osseous abnormality   Workstation performed: [id number]       Labs:   Lab Results   Component Value Date    WBC 13 88 (H) 04/10/2023    HGB 12 1 04/10/2023    HCT 34 6 (L) 04/10/2023    MCV 95 04/10/2023     04/10/2023     Lab Results   Component Value Date    K 3 7 04/10/2023    CL 99 04/10/2023    CO2 29 04/10/2023    BUN 13 04/10/2023    CREATININE 0 87 04/10/2023    CALCIUM 9 2 04/10/2023    AST 29 04/10/2023    ALT 84 (H) 04/10/2023    ALKPHOS 127 (H) 04/10/2023    EGFR 111 04/10/2023       No results found for: IRON, TIBC, FERRITIN    Lab Results   Component Value Date    [id number] 035 07/19/2022         ROS: Review of Systems   Constitutional: Negative  Negative for appetite change, chills, diaphoresis, fatigue, fever and unexpected weight change  HENT:   Negative for hearing loss, lump/mass, mouth sores, nosebleeds, sore throat, trouble swallowing and voice change  Eyes: Negative  Negative for eye problems and icterus  Respiratory: Negative  Negative for chest tightness, cough, hemoptysis and shortness of breath  Cardiovascular: Negative for chest pain and leg swelling  Gastrointestinal: Negative for abdominal distention, abdominal pain, blood in stool, constipation, diarrhea and nausea  Endocrine: Negative  Genitourinary: Negative for dysuria, frequency, hematuria and pelvic pain  Musculoskeletal: Negative  Negative for arthralgias, back pain, flank pain, gait problem, myalgias and neck stiffness  Skin: Negative for itching and rash  Neurological: Negative for dizziness, gait problem, headaches, light-headedness, numbness and speech difficulty  Hematological: Negative for adenopathy  Does not bruise/bleed easily  Psychiatric/Behavioral: Positive for confusion, decreased concentration, depression and sleep disturbance  The patient is not nervous/anxious  Current Medications: Reviewed  Allergies: Reviewed  PMH/FH/SH:  Reviewed      Physical Exam:    Body surface area is 2 7 meters squared      Wt Readings from Last 3 Encounters:   04/24/23 (!) 144 kg (317 lb)   04/20/23 (!) 144 kg (317 lb)   04/14/23 (!) 143 kg (314 lb 13 1 oz)        Temp Readings from Last 3 Encounters:   23 (!) 97 4 °F (36 3 °C) (Tympanic)   04/15/23 (!) 97 °F (36 1 °C) (Temporal)   23 99 °F (37 2 °C) (Temporal)        BP Readings from Last 3 Encounters:   23 120/75   23 120/76   23 123/84         Pulse Readings from Last 3 Encounters:   23 74   23 92   23 78        Physical Exam  Vitals reviewed  Constitutional:       General: He is not in acute distress  Appearance: He is well-developed  He is not diaphoretic  HENT:      Head: Normocephalic and atraumatic  Eyes:      Conjunctiva/sclera: Conjunctivae normal    Neck:      Trachea: No tracheal deviation  Cardiovascular:      Rate and Rhythm: Normal rate and regular rhythm  Heart sounds: No murmur heard  No friction rub  No gallop  Pulmonary:      Effort: Pulmonary effort is normal  No respiratory distress  Breath sounds: Normal breath sounds  No wheezing or rales  Chest:      Chest wall: No tenderness  Abdominal:      General: There is no distension  Palpations: Abdomen is soft  Tenderness: There is no abdominal tenderness  Musculoskeletal:      Cervical back: Normal range of motion and neck supple  Right lower leg: Edema present  Left lower leg: Edema present  Lymphadenopathy:      Cervical: No cervical adenopathy  Skin:     General: Skin is warm and dry  Coloration: Skin is not pale  Findings: No erythema  Neurological:      Mental Status: He is alert and oriented to person, place, and time  Psychiatric:         Mood and Affect: Affect is blunt and flat  Behavior: Behavior is slowed  ECO  Goals and Barriers:  Current Goal: Minimize effects of disease  Barriers: None  Patient's Capacity to Self Care:  Patient is able to self care      Code Status: [unfilled]

## 2023-04-30 ENCOUNTER — APPOINTMENT (OUTPATIENT)
Dept: LAB | Facility: CLINIC | Age: 38
End: 2023-04-30

## 2023-04-30 DIAGNOSIS — D70.1 CHEMOTHERAPY INDUCED NEUTROPENIA (HCC): ICD-10-CM

## 2023-04-30 DIAGNOSIS — T45.1X5A CHEMOTHERAPY INDUCED NEUTROPENIA (HCC): ICD-10-CM

## 2023-04-30 DIAGNOSIS — C62.92 SEMINOMA OF LEFT TESTIS (HCC): ICD-10-CM

## 2023-04-30 LAB
ALBUMIN SERPL BCP-MCNC: 3.8 G/DL (ref 3.5–5)
ALP SERPL-CCNC: 126 U/L (ref 34–104)
ALT SERPL W P-5'-P-CCNC: 66 U/L (ref 7–52)
ANION GAP SERPL CALCULATED.3IONS-SCNC: 6 MMOL/L (ref 4–13)
ANISOCYTOSIS BLD QL SMEAR: PRESENT
AST SERPL W P-5'-P-CCNC: 25 U/L (ref 13–39)
BASOPHILS # BLD MANUAL: 0 THOUSAND/UL (ref 0–0.1)
BASOPHILS NFR MAR MANUAL: 0 % (ref 0–1)
BILIRUB SERPL-MCNC: 0.54 MG/DL (ref 0.2–1)
BUN SERPL-MCNC: 10 MG/DL (ref 5–25)
CALCIUM SERPL-MCNC: 9.1 MG/DL (ref 8.4–10.2)
CHLORIDE SERPL-SCNC: 100 MMOL/L (ref 96–108)
CO2 SERPL-SCNC: 28 MMOL/L (ref 21–32)
CREAT SERPL-MCNC: 0.85 MG/DL (ref 0.6–1.3)
EOSINOPHIL # BLD MANUAL: 0 THOUSAND/UL (ref 0–0.4)
EOSINOPHIL NFR BLD MANUAL: 0 % (ref 0–6)
ERYTHROCYTE [DISTWIDTH] IN BLOOD BY AUTOMATED COUNT: 15.3 % (ref 11.6–15.1)
GFR SERPL CREATININE-BSD FRML MDRD: 112 ML/MIN/1.73SQ M
GLUCOSE P FAST SERPL-MCNC: 90 MG/DL (ref 65–99)
HCT VFR BLD AUTO: 35.6 % (ref 36.5–49.3)
HGB BLD-MCNC: 11.9 G/DL (ref 12–17)
LG PLATELETS BLD QL SMEAR: PRESENT
LYMPHOCYTES # BLD AUTO: 22 % (ref 14–44)
LYMPHOCYTES # BLD AUTO: 3.17 THOUSAND/UL (ref 0.6–4.47)
MAGNESIUM SERPL-MCNC: 1.9 MG/DL (ref 1.9–2.7)
MCH RBC QN AUTO: 32.5 PG (ref 26.8–34.3)
MCHC RBC AUTO-ENTMCNC: 33.4 G/DL (ref 31.4–37.4)
MCV RBC AUTO: 97 FL (ref 82–98)
METAMYELOCYTES NFR BLD MANUAL: 1 % (ref 0–1)
MONOCYTES # BLD AUTO: 0.86 THOUSAND/UL (ref 0–1.22)
MONOCYTES NFR BLD: 6 % (ref 4–12)
MYELOCYTES NFR BLD MANUAL: 4 % (ref 0–1)
NEUTROPHILS # BLD MANUAL: 9.5 THOUSAND/UL (ref 1.85–7.62)
NEUTS BAND NFR BLD MANUAL: 2 % (ref 0–8)
NEUTS SEG NFR BLD AUTO: 64 % (ref 43–75)
PLATELET # BLD AUTO: 244 THOUSANDS/UL (ref 149–390)
PLATELET BLD QL SMEAR: ADEQUATE
PMV BLD AUTO: 9.3 FL (ref 8.9–12.7)
POTASSIUM SERPL-SCNC: 3.6 MMOL/L (ref 3.5–5.3)
PROT SERPL-MCNC: 7 G/DL (ref 6.4–8.4)
RBC # BLD AUTO: 3.66 MILLION/UL (ref 3.88–5.62)
RBC MORPH BLD: PRESENT
SODIUM SERPL-SCNC: 134 MMOL/L (ref 135–147)
VARIANT LYMPHS # BLD AUTO: 1 %
WBC # BLD AUTO: 14.4 THOUSAND/UL (ref 4.31–10.16)

## 2023-05-01 ENCOUNTER — HOSPITAL ENCOUNTER (OUTPATIENT)
Dept: INFUSION CENTER | Facility: HOSPITAL | Age: 38
Discharge: HOME | End: 2023-05-01
Attending: INTERNAL MEDICINE

## 2023-05-01 VITALS
TEMPERATURE: 97.4 F | RESPIRATION RATE: 18 BRPM | DIASTOLIC BLOOD PRESSURE: 85 MMHG | HEART RATE: 71 BPM | SYSTOLIC BLOOD PRESSURE: 121 MMHG | WEIGHT: 315 LBS | HEIGHT: 76 IN | BODY MASS INDEX: 38.36 KG/M2

## 2023-05-01 DIAGNOSIS — C62.92 SEMINOMA OF LEFT TESTIS (HCC): ICD-10-CM

## 2023-05-01 DIAGNOSIS — D70.1 CHEMOTHERAPY INDUCED NEUTROPENIA (HCC): Primary | ICD-10-CM

## 2023-05-01 DIAGNOSIS — T45.1X5A CHEMOTHERAPY INDUCED NEUTROPENIA (HCC): Primary | ICD-10-CM

## 2023-05-01 RX ORDER — SODIUM CHLORIDE 9 MG/ML
20 INJECTION, SOLUTION INTRAVENOUS ONCE
Status: COMPLETED | OUTPATIENT
Start: 2023-05-01 | End: 2023-05-01

## 2023-05-01 RX ADMIN — DEXAMETHASONE SODIUM PHOSPHATE: 10 INJECTION, SOLUTION INTRAMUSCULAR; INTRAVENOUS at 08:37

## 2023-05-01 RX ADMIN — SODIUM CHLORIDE 500 ML: 0.9 INJECTION, SOLUTION INTRAVENOUS at 11:01

## 2023-05-01 RX ADMIN — APREPITANT 130 MG: 130 INJECTION, EMULSION INTRAVENOUS at 09:11

## 2023-05-01 RX ADMIN — SODIUM CHLORIDE 20 ML/HR: 0.9 INJECTION, SOLUTION INTRAVENOUS at 08:37

## 2023-05-01 RX ADMIN — ETOPOSIDE 270 MG: 20 INJECTION INTRAVENOUS at 09:54

## 2023-05-01 NOTE — PROGRESS NOTES
Pt tolerated etoposide and post hydration without complications   confirmed appt back tomorrow morning for day 2

## 2023-05-01 NOTE — PROGRESS NOTES
TIME OUT:  Spoke with Hadley Peter, JEAN-PAUL and per Dr Carol Cordoba, discontinue Cisplatin due to continuing peripheral neuropathy symptoms in fingers  Shilpi Bashir in pharmacy aware  Will keep post hydration due to dehydration

## 2023-05-01 NOTE — PROGRESS NOTES
Per Mine Cross RN patient and sister report worsening neuropathy in his hands, affecting his ability to pick things up  Disused with Dr Ludmila Colorado, Per Dr Ludmila Colorado D/C Cisplatin today for all future treatment

## 2023-05-02 ENCOUNTER — TELEPHONE (OUTPATIENT)
Dept: FAMILY MEDICINE CLINIC | Facility: CLINIC | Age: 38
End: 2023-05-02

## 2023-05-02 ENCOUNTER — HOSPITAL ENCOUNTER (OUTPATIENT)
Dept: INFUSION CENTER | Facility: HOSPITAL | Age: 38
Discharge: HOME | End: 2023-05-02
Attending: INTERNAL MEDICINE

## 2023-05-02 VITALS
HEIGHT: 76 IN | BODY MASS INDEX: 38.36 KG/M2 | WEIGHT: 315 LBS | RESPIRATION RATE: 18 BRPM | SYSTOLIC BLOOD PRESSURE: 133 MMHG | DIASTOLIC BLOOD PRESSURE: 87 MMHG | HEART RATE: 83 BPM | TEMPERATURE: 97.2 F

## 2023-05-02 DIAGNOSIS — T45.1X5A CHEMOTHERAPY INDUCED NEUTROPENIA (HCC): Primary | ICD-10-CM

## 2023-05-02 DIAGNOSIS — D70.1 CHEMOTHERAPY INDUCED NEUTROPENIA (HCC): Primary | ICD-10-CM

## 2023-05-02 DIAGNOSIS — C62.92 SEMINOMA OF LEFT TESTIS (HCC): ICD-10-CM

## 2023-05-02 RX ORDER — SODIUM CHLORIDE 9 MG/ML
20 INJECTION, SOLUTION INTRAVENOUS ONCE
Status: COMPLETED | OUTPATIENT
Start: 2023-05-02 | End: 2023-05-02

## 2023-05-02 RX ORDER — MAGNESIUM SULFATE HEPTAHYDRATE 40 MG/ML
2 INJECTION, SOLUTION INTRAVENOUS ONCE
Status: COMPLETED | OUTPATIENT
Start: 2023-05-02 | End: 2023-05-02

## 2023-05-02 RX ADMIN — ETOPOSIDE 270 MG: 20 INJECTION INTRAVENOUS at 10:47

## 2023-05-02 RX ADMIN — SODIUM CHLORIDE 500 ML: 0.9 INJECTION, SOLUTION INTRAVENOUS at 12:00

## 2023-05-02 RX ADMIN — MAGNESIUM SULFATE HEPTAHYDRATE 2 G: 2 INJECTION, SOLUTION INTRAVENOUS at 09:02

## 2023-05-02 RX ADMIN — SODIUM CHLORIDE 20 ML/HR: 0.9 INJECTION, SOLUTION INTRAVENOUS at 09:00

## 2023-05-02 RX ADMIN — ONDANSETRON: 2 INJECTION INTRAMUSCULAR; INTRAVENOUS at 10:06

## 2023-05-02 NOTE — TELEPHONE ENCOUNTER
Mando Watson from [facility] called says Gemrantony Brandt is having an MRI of the orbit referred by Dr Benigno Colon on 5/7/23    Mando Watson says a prior Yenni Ren is needed -    NPI : [id number]  Dx: [id number]    [facility]  [address] [place] [address]

## 2023-05-02 NOTE — PROGRESS NOTES
Pt tolerated Day 2 of treatment today with no adverse reactions  Next apt time confirmed  Left unit ambulatory with a steady gait

## 2023-05-03 ENCOUNTER — HOSPITAL ENCOUNTER (OUTPATIENT)
Dept: INFUSION CENTER | Facility: HOSPITAL | Age: 38
Discharge: HOME | End: 2023-05-03
Attending: INTERNAL MEDICINE

## 2023-05-03 VITALS
BODY MASS INDEX: 38.36 KG/M2 | DIASTOLIC BLOOD PRESSURE: 79 MMHG | RESPIRATION RATE: 20 BRPM | HEART RATE: 69 BPM | TEMPERATURE: 97.9 F | SYSTOLIC BLOOD PRESSURE: 116 MMHG | WEIGHT: 315 LBS | HEIGHT: 76 IN

## 2023-05-03 DIAGNOSIS — T45.1X5A CHEMOTHERAPY INDUCED NEUTROPENIA (HCC): Primary | ICD-10-CM

## 2023-05-03 DIAGNOSIS — C62.92 SEMINOMA OF LEFT TESTIS (HCC): ICD-10-CM

## 2023-05-03 DIAGNOSIS — D70.1 CHEMOTHERAPY INDUCED NEUTROPENIA (HCC): Primary | ICD-10-CM

## 2023-05-03 RX ORDER — SODIUM CHLORIDE 9 MG/ML
20 INJECTION, SOLUTION INTRAVENOUS ONCE
Status: COMPLETED | OUTPATIENT
Start: 2023-05-03 | End: 2023-05-03

## 2023-05-03 RX ADMIN — ETOPOSIDE 270 MG: 20 INJECTION INTRAVENOUS at 09:27

## 2023-05-03 RX ADMIN — ONDANSETRON: 2 INJECTION INTRAMUSCULAR; INTRAVENOUS at 08:42

## 2023-05-03 RX ADMIN — SODIUM CHLORIDE 20 ML/HR: 0.9 INJECTION, SOLUTION INTRAVENOUS at 08:35

## 2023-05-03 RX ADMIN — SODIUM CHLORIDE 500 ML: 0.9 INJECTION, SOLUTION INTRAVENOUS at 10:43

## 2023-05-03 NOTE — PROGRESS NOTES
Pt tolerated day 3 of treatment today with no adverse reactions  Left unit ambulatory with a steady gait

## 2023-05-04 ENCOUNTER — APPOINTMENT (EMERGENCY)
Dept: CT IMAGING | Facility: HOSPITAL | Age: 38
End: 2023-05-04

## 2023-05-04 ENCOUNTER — HOSPITAL ENCOUNTER (EMERGENCY)
Facility: HOSPITAL | Age: 38
End: 2023-05-04
Attending: EMERGENCY MEDICINE

## 2023-05-04 ENCOUNTER — HOSPITAL ENCOUNTER (OUTPATIENT)
Dept: INFUSION CENTER | Facility: HOSPITAL | Age: 38
Discharge: HOME | End: 2023-05-04
Attending: INTERNAL MEDICINE

## 2023-05-04 ENCOUNTER — APPOINTMENT (EMERGENCY)
Dept: RADIOLOGY | Facility: HOSPITAL | Age: 38
End: 2023-05-04

## 2023-05-04 VITALS
HEIGHT: 76 IN | DIASTOLIC BLOOD PRESSURE: 89 MMHG | WEIGHT: 315 LBS | RESPIRATION RATE: 18 BRPM | TEMPERATURE: 97.9 F | SYSTOLIC BLOOD PRESSURE: 127 MMHG | BODY MASS INDEX: 38.36 KG/M2 | HEART RATE: 69 BPM

## 2023-05-04 VITALS
OXYGEN SATURATION: 99 % | RESPIRATION RATE: 20 BRPM | SYSTOLIC BLOOD PRESSURE: 118 MMHG | HEART RATE: 57 BPM | DIASTOLIC BLOOD PRESSURE: 86 MMHG | TEMPERATURE: 97 F

## 2023-05-04 DIAGNOSIS — Z95.828 PORT-A-CATH IN PLACE: ICD-10-CM

## 2023-05-04 DIAGNOSIS — R42 DIZZINESS: Primary | ICD-10-CM

## 2023-05-04 DIAGNOSIS — T45.1X5A CHEMOTHERAPY INDUCED NEUTROPENIA (HCC): Primary | ICD-10-CM

## 2023-05-04 DIAGNOSIS — D70.1 CHEMOTHERAPY INDUCED NEUTROPENIA (HCC): Primary | ICD-10-CM

## 2023-05-04 DIAGNOSIS — C62.92 SEMINOMA OF LEFT TESTIS (HCC): ICD-10-CM

## 2023-05-04 LAB
ALBUMIN SERPL BCP-MCNC: 3.8 G/DL (ref 3.5–5)
ALP SERPL-CCNC: 102 U/L (ref 34–104)
ALT SERPL W P-5'-P-CCNC: 69 U/L (ref 7–52)
ANION GAP SERPL CALCULATED.3IONS-SCNC: 9 MMOL/L (ref 4–13)
AST SERPL W P-5'-P-CCNC: 23 U/L (ref 13–39)
ATRIAL RATE: 61 BPM
BASOPHILS # BLD AUTO: 0.01 THOUSANDS/ÂΜL (ref 0–0.1)
BASOPHILS NFR BLD AUTO: 0 % (ref 0–1)
BILIRUB SERPL-MCNC: 0.71 MG/DL (ref 0.2–1)
BILIRUB UR QL STRIP: NEGATIVE
BNP SERPL-MCNC: 270 PG/ML (ref 0–100)
BUN SERPL-MCNC: 14 MG/DL (ref 5–25)
CALCIUM SERPL-MCNC: 9 MG/DL (ref 8.4–10.2)
CHLORIDE SERPL-SCNC: 101 MMOL/L (ref 96–108)
CLARITY UR: CLEAR
CO2 SERPL-SCNC: 27 MMOL/L (ref 21–32)
COLOR UR: ABNORMAL
CREAT SERPL-MCNC: 0.71 MG/DL (ref 0.6–1.3)
EOSINOPHIL # BLD AUTO: 0.03 THOUSAND/ÂΜL (ref 0–0.61)
EOSINOPHIL NFR BLD AUTO: 0 % (ref 0–6)
ERYTHROCYTE [DISTWIDTH] IN BLOOD BY AUTOMATED COUNT: 15.6 % (ref 11.6–15.1)
GFR SERPL CREATININE-BSD FRML MDRD: 120 ML/MIN/1.73SQ M
GLUCOSE SERPL-MCNC: 82 MG/DL (ref 65–140)
GLUCOSE UR STRIP-MCNC: NEGATIVE MG/DL
HCT VFR BLD AUTO: 31.4 % (ref 36.5–49.3)
HGB BLD-MCNC: 10.8 G/DL (ref 12–17)
HGB UR QL STRIP.AUTO: NEGATIVE
IMM GRANULOCYTES # BLD AUTO: 0.04 THOUSAND/UL (ref 0–0.2)
IMM GRANULOCYTES NFR BLD AUTO: 1 % (ref 0–2)
KETONES UR STRIP-MCNC: NEGATIVE MG/DL
LEUKOCYTE ESTERASE UR QL STRIP: NEGATIVE
LYMPHOCYTES # BLD AUTO: 1.41 THOUSANDS/ÂΜL (ref 0.6–4.47)
LYMPHOCYTES NFR BLD AUTO: 16 % (ref 14–44)
MAGNESIUM SERPL-MCNC: 2.2 MG/DL (ref 1.9–2.7)
MCH RBC QN AUTO: 33.3 PG (ref 26.8–34.3)
MCHC RBC AUTO-ENTMCNC: 34.4 G/DL (ref 31.4–37.4)
MCV RBC AUTO: 97 FL (ref 82–98)
MONOCYTES # BLD AUTO: 0.25 THOUSAND/ÂΜL (ref 0.17–1.22)
MONOCYTES NFR BLD AUTO: 3 % (ref 4–12)
NEUTROPHILS # BLD AUTO: 6.99 THOUSANDS/ÂΜL (ref 1.85–7.62)
NEUTS SEG NFR BLD AUTO: 80 % (ref 43–75)
NITRITE UR QL STRIP: NEGATIVE
NRBC BLD AUTO-RTO: 0 /100 WBCS
P AXIS: 41 DEGREES
PH UR STRIP.AUTO: 7 [PH]
PLATELET # BLD AUTO: 289 THOUSANDS/UL (ref 149–390)
PMV BLD AUTO: 8.9 FL (ref 8.9–12.7)
POTASSIUM SERPL-SCNC: 3.9 MMOL/L (ref 3.5–5.3)
PR INTERVAL: 166 MS
PROT SERPL-MCNC: 6.6 G/DL (ref 6.4–8.4)
PROT UR STRIP-MCNC: NEGATIVE MG/DL
QRS AXIS: 33 DEGREES
QRSD INTERVAL: 86 MS
QT INTERVAL: 400 MS
QTC INTERVAL: 402 MS
RBC # BLD AUTO: 3.24 MILLION/UL (ref 3.88–5.62)
SODIUM SERPL-SCNC: 137 MMOL/L (ref 135–147)
SP GR UR STRIP.AUTO: 1.01 (ref 1–1.04)
T WAVE AXIS: 43 DEGREES
UROBILINOGEN UA: 1 MG/DL
VENTRICULAR RATE: 61 BPM
WBC # BLD AUTO: 8.73 THOUSAND/UL (ref 4.31–10.16)

## 2023-05-04 RX ORDER — FUROSEMIDE 10 MG/ML
20 INJECTION INTRAMUSCULAR; INTRAVENOUS ONCE
Status: COMPLETED | OUTPATIENT
Start: 2023-05-04 | End: 2023-05-04

## 2023-05-04 RX ORDER — MECLIZINE HYDROCHLORIDE 25 MG/1
25 TABLET ORAL 3 TIMES DAILY PRN
Qty: 30 TABLET | Refills: 0 | Status: SHIPPED | OUTPATIENT
Start: 2023-05-04

## 2023-05-04 RX ORDER — MECLIZINE HCL 12.5 MG/1
25 TABLET ORAL ONCE
Status: COMPLETED | OUTPATIENT
Start: 2023-05-04 | End: 2023-05-04

## 2023-05-04 RX ADMIN — FUROSEMIDE 20 MG: 10 INJECTION, SOLUTION INTRAMUSCULAR; INTRAVENOUS at 10:46

## 2023-05-04 RX ADMIN — MECLIZINE 25 MG: 12.5 TABLET ORAL at 10:57

## 2023-05-04 NOTE — PROGRESS NOTES
Pt arrives today stating he woke up dizzy and has double vision today  Patient was unsteady on feet and lost balance while walking into infusion center  Advised with April Marques RN and advised to send to ED  Report given to [facility] in ED and patient will have port accessed  Patient transferred to ED via wheelchair

## 2023-05-04 NOTE — ED PROVIDER NOTES
"History  Chief Complaint   Patient presents with    Dizziness     Pt arrives from his infusion appointment for his daily chemotherapy  (5 days a week) pt has come everyday  Pt today was off balance and c/o dizziness  Pt states he has felt dizzy all week and has had diarrhea all week today feels \"weak\" swelling noted to bilateral lower extremities  Family adds she has noticed he has been sob      HPI  Patient is a 37-year-old male past medical history of anxiety, depression, testicular cancer currently undergoing chemotherapy presenting today for dizziness feeling off balance and lower extremity edema  History is obtained from both patient and sister at bedside  Reports undergoing chemotherapy infusions  Today he was at his infusion appointment when they noted that he was off balance and complaining of dizziness prompting ED eval   He reports that for the past week he has had intermittent dizziness has been slowly worsening  He also reports weakness and occasional shortness of breath  On further history he also reports bilateral lower extremity edema ongoing for about 1 month  Also reports a 3 pillow orthopnea  Denies PND  Reports slight shortness of breath  Of note he has been prescribed torsemide for this lower extremity edema but reports that he has not been taking  Sister also reports that she noticed some yellowing of the skin Monday into Tuesday  This is since improved  There was some concern that he might be altered but family feels he is at his baseline  He is slow to respond to things but again family says at baseline  He is currently alert oriented x4  He denies any headache denies any fevers chills nausea vomiting diarrhea constipation  He denies any known sick exposures  Prior to Admission Medications   Prescriptions Last Dose Informant Patient Reported? Taking?    FLUoxetine (PROzac) 10 mg capsule   No No   Sig: Take 1 capsule (10 mg total) by mouth daily   QUEtiapine (SEROquel) 25 mg " tablet   No No   Sig: Take 1 tablet (25 mg total) by mouth daily at bedtime   albuterol (ProAir HFA) 90 mcg/act inhaler   No No   Sig: Inhale 2 puffs every 6 (six) hours as needed for wheezing   amLODIPine (NORVASC) 10 mg tablet   No No   Sig: Take 1 tablet (10 mg total) by mouth daily   losartan (COZAAR) 50 mg tablet   No No   Sig: Take 1 tablet (50 mg total) by mouth daily   naproxen (Naprosyn) 500 mg tablet   No No   Sig: Take 1 tablet (500 mg total) by mouth 2 (two) times a day with meals   tamsulosin (FLOMAX) 0 4 mg   No No   Sig: Take 1 capsule (0 4 mg total) by mouth daily with dinner   torsemide (DEMADEX) 10 mg tablet   No No   Sig: Take 1 tablet (10 mg total) by mouth daily      Facility-Administered Medications: None       Past Medical History:   Diagnosis Date    Anxiety     Cancer ([facility] [address] )     Depression     Psychiatric disorder     bipolar       Past Surgical History:   Procedure Laterality Date    IR BIOPSY LYMPH NODE  2023    IR PORT PLACEMENT  3/14/2023    ORCHIECTOMY Left 2022    Procedure: ORCHIECTOMY INGUINAL;  Surgeon: Olivier Godinez MD;  Location: [facility];  Service: Urology       History reviewed  No pertinent family history  I have reviewed and agree with the history as documented  E-Cigarette/Vaping    E-Cigarette Use Never User      E-Cigarette/Vaping Substances     Social History     Tobacco Use    Smoking status: Every Day     Packs/day: 0 50     Types: Cigarettes     Start date: 2008     Last attempt to quit: 2022     Years since quittin 4    Smokeless tobacco: Never   Vaping Use    Vaping Use: Never used   Substance Use Topics    Alcohol use: Not Currently     Comment: 2 cases of beer daily, stopped 3 years ago    Drug use: Yes     Types: Marijuana     Comment: daily last smoked 3/13/23       Review of Systems   Constitutional: Negative for chills and fever  HENT: Negative for congestion, rhinorrhea and sore throat      Eyes: Negative for redness and visual disturbance  Respiratory: Positive for shortness of breath  Negative for cough  Cardiovascular: Positive for leg swelling  Negative for chest pain and palpitations  Gastrointestinal: Negative for constipation, diarrhea, nausea and vomiting  Genitourinary: Negative for dysuria and hematuria  Musculoskeletal: Negative for myalgias and neck pain  Skin: Negative for rash and wound  Allergic/Immunologic: Negative for immunocompromised state  Neurological: Positive for dizziness and light-headedness  Negative for seizures and syncope  Psychiatric/Behavioral: Negative for confusion and suicidal ideas  Physical Exam  Physical Exam  Vitals and nursing note reviewed  Constitutional:       General: He is not in acute distress  Appearance: Normal appearance  He is well-developed  HENT:      Head: Normocephalic and atraumatic  No raccoon eyes  Right Ear: External ear normal       Left Ear: External ear normal       Nose: Nose normal  No congestion  Mouth/Throat:      Lips: Pink  Mouth: Mucous membranes are moist    Eyes:      General: Lids are normal  No scleral icterus  Extraocular Movements: Extraocular movements intact  Cardiovascular:      Rate and Rhythm: Normal rate and regular rhythm  Pulses:           Radial pulses are 2+ on the right side and 2+ on the left side  Heart sounds: No murmur heard  No friction rub  Comments: Bilateral 2+ pitting edema to level of mid calf  Pulmonary:      Effort: Pulmonary effort is normal  No respiratory distress  Breath sounds: No wheezing or rhonchi  Abdominal:      General: Abdomen is flat  Palpations: Abdomen is soft  Tenderness: There is no abdominal tenderness  There is no guarding or rebound  Musculoskeletal:      Cervical back: Normal range of motion  No torticollis  Right lower le+ Pitting Edema present  Left lower le+ Pitting Edema present     Skin: General: Skin is warm and dry  Coloration: Skin is not jaundiced  Findings: No rash  Neurological:      Mental Status: He is alert and oriented to person, place, and time  Mental status is at baseline  GCS: GCS eye subscore is 4  GCS verbal subscore is 5  GCS motor subscore is 6  Cranial Nerves: No dysarthria or facial asymmetry  Sensory: Sensation is intact  No sensory deficit  Motor: No weakness  Psychiatric:         Behavior: Behavior normal  Behavior is cooperative           Vital Signs  ED Triage Vitals [05/04/23 0849]   Temperature Pulse Respirations Blood Pressure SpO2   (!) 97 °F (36 1 °C) 59 20 144/90 97 %      Temp Source Heart Rate Source Patient Position - Orthostatic VS BP Location FiO2 (%)   Tympanic Monitor Lying Left arm --      Pain Score       7           Vitals:    05/04/23 0849 05/04/23 1043   BP: 144/90 118/86   Pulse: 59 57   Patient Position - Orthostatic VS: Lying Lying         Visual Acuity      ED Medications  Medications   furosemide (LASIX) injection 20 mg (20 mg Intravenous Given 5/4/23 1046)   meclizine (ANTIVERT) tablet 25 mg (25 mg Oral Given 5/4/23 1057)       Diagnostic Studies  Results Reviewed     Procedure Component Value Units Date/Time    B-Type Natriuretic Peptide(BNP) [[id number]]  (Abnormal) Collected: 05/04/23 0906    Lab Status: Final result Specimen: Blood from Artery Updated: 05/04/23 1007      pg/mL     Comprehensive metabolic panel [[id number]]  (Abnormal) Collected: 05/04/23 0906    Lab Status: Final result Specimen: Blood from Central Venous Line Updated: 05/04/23 0957     Sodium 137 mmol/L      Potassium 3 9 mmol/L      Chloride 101 mmol/L      CO2 27 mmol/L      ANION GAP 9 mmol/L      BUN 14 mg/dL      Creatinine 0 71 mg/dL      Glucose 82 mg/dL      Calcium 9 0 mg/dL      AST 23 U/L      ALT 69 U/L      Alkaline Phosphatase 102 U/L      Total Protein 6 6 g/dL      Albumin 3 8 g/dL      Total Bilirubin 0 71 mg/dL      eGFR 120 ml/min/1 73sq m     Narrative:      National Kidney Disease Foundation guidelines for Chronic Kidney Disease (CKD):     Stage 1 with normal or high GFR (GFR > 90 mL/min/1 73 square meters)    Stage 2 Mild CKD (GFR = 60-89 mL/min/1 73 square meters)    Stage 3A Moderate CKD (GFR = 45-59 mL/min/1 73 square meters)    Stage 3B Moderate CKD (GFR = 30-44 mL/min/1 73 square meters)    Stage 4 Severe CKD (GFR = 15-29 mL/min/1 73 square meters)    Stage 5 End Stage CKD (GFR <15 mL/min/1 73 square meters)  Note: GFR calculation is accurate only with a steady state creatinine    Magnesium [[id number]]  (Normal) Collected: 05/04/23 0906    Lab Status: Final result Specimen: Blood from Central Venous Line Updated: 05/04/23 0957     Magnesium 2 2 mg/dL     UA w Reflex to Microscopic w Reflex to Culture [[id number]]  (Abnormal) Collected: 05/04/23 0910    Lab Status: Final result Specimen: Urine, Other Updated: 05/04/23 0956     Color, UA Debbie     Clarity, UA Clear     Specific Gravity, UA 1 015     pH, UA 7 0     Leukocytes, UA Negative     Nitrite, UA Negative     Protein, UA Negative mg/dl      Glucose, UA Negative mg/dl      Ketones, UA Negative mg/dl      Bilirubin, UA Negative     Occult Blood, UA Negative     UROBILINOGEN UA 1 0 mg/dL     CBC and differential [[id number]]  (Abnormal) Collected: 05/04/23 0906    Lab Status: Final result Specimen: Blood from Central Venous Line Updated: 05/04/23 0913     WBC 8 73 Thousand/uL      RBC 3 24 Million/uL      Hemoglobin 10 8 g/dL      Hematocrit 31 4 %      MCV 97 fL      MCH 33 3 pg      MCHC 34 4 g/dL      RDW 15 6 %      MPV 8 9 fL      Platelets 220 Thousands/uL      nRBC 0 /100 WBCs      Neutrophils Relative 80 %      Immat GRANS % 1 %      Lymphocytes Relative 16 %      Monocytes Relative 3 %      Eosinophils Relative 0 %      Basophils Relative 0 %      Neutrophils Absolute 6 99 Thousands/µL      Immature Grans Absolute 0 04 Thousand/uL      Lymphocytes Absolute 1 41 Thousands/µL      Monocytes Absolute 0 25 Thousand/µL      Eosinophils Absolute 0 03 Thousand/µL      Basophils Absolute 0 01 Thousands/µL                  XR chest 2 views   ED Interpretation by Judi Bui MD (05/04 1034)   No acute cardiopulmonary process  CT head without contrast   Final Result by Eder Miranda MD (05/04 1034)      No evidence of acute intracranial process  Stable minimal patchy left frontoparietal subcortical white matter disease  This is a nonspecific finding and may be sequela of precocious microangiopathy or demyelination among other etiologies  Workstation performed: [id number]                    Procedures  Procedures         ED Course  ED Course as of 05/04/23 1141   Thu May 04, 2023   0859 cisplatin/etoposide    0903 Dizziness has been reported with etoposide as a side effect, could be contributing     0909 EKG NSR wo ST deviation or t wave inversions, No significant QRS widening or QT prolongation  EKG interpretation done by myself        0924 Hgb down-trending 12 1 3 weeks ago   1003 Leukocytes, UA: Negative   1003 Nitrite, UA: Negative   1003 Magnesium: 2 2   1003 Sodium: 137   1003 Potassium: 3 9   1003 Chloride: 101   1003 Creatinine: 0 71   1020 BNP(!): 270   1043 IMPRESSION:     No evidence of acute intracranial process      Stable minimal patchy left frontoparietal subcortical white matter disease  This is a nonspecific finding and may be sequela of precocious microangiopathy or demyelination among other etiologies  Patient ambulatory in the department without significant ataxia  Given slightly elevated BNP and lower extremity edema we will give Lasix however encouraged resumption of torsemide on discharge  We will treat vertigo symptoms with meclizine  Discussed with primary oncologist will return to infusion center for further infusion we will follow-up in clinic  Return precautions discussed                          SBIRT 20yo+ "  Flowsheet Row Most Recent Value   Initial Alcohol Screen: US AUDIT-C     1  How often do you have a drink containing alcohol? 0 Filed at: 05/04/2023 0851   2  How many drinks containing alcohol do you have on a typical day you are drinking? 0 Filed at: 05/04/2023 0851   3a  Male UNDER 65: How often do you have five or more drinks on one occasion? 0 Filed at: 05/04/2023 0851   3b  FEMALE Any Age, or MALE 65+: How often do you have 4 or more drinks on one occassion? 0 Filed at: 05/04/2023 0851   Audit-C Score 0 Filed at: 05/04/2023 0516   JEAN MARIE: How many times in the past year have you    Used an illegal drug or used a prescription medication for non-medical reasons? Weekly Filed at: 05/04/2023 0851   DAST-10: In the past 12 months       1  Have you used drugs other than those required for medical reasons? 0 Filed at: 05/04/2023 0851   2  Do you use more than one drug at a time? 0 Filed at: 05/04/2023 0851   3  Have you had medical problems as a result of your drug use (e g , memory loss, hepatitis, convulsions, bleeding, etc )? 0 Filed at: 05/04/2023 0851   4  Have you had \"blackouts\" or \"flashbacks\" as a result of drug use? YesNo 0 Filed at: 05/04/2023 0851   5  Do you ever feel bad or guilty about your drug use? 0 Filed at: 05/04/2023 0851   6  Does your spouse (or parent) ever complain about your involvement with drugs? 0 Filed at: 05/04/2023 0851   7  Have you neglected your family because of your use of drugs? 0 Filed at: 05/04/2023 0851   8  Have you engaged in illegal activities in order to obtain drugs? 0 Filed at: 05/04/2023 0851   9  Have you ever experienced withdrawal symptoms (felt sick) when you stopped taking drugs? 0 Filed at: 05/04/2023 0851   10  Are you always able to stop using drugs when you want to? 0 Filed at: 05/04/2023 0851   DAST-10 Score 0 Filed at: 05/04/2023 4093                    [facility]  Patient on arrival is ambulatory to room is in no acute distress, vital signs stable, afebrile    On " exam lungs clear auscultation, heart without murmurs rubs or gallops abdomen soft nontender  Given his cancer history we will obtain CT head to assess for any potential metastasis  Will obtain labs to assess for metabolic abnormality EKG to assess for arrhythmia  CBC to assess for anemia CMP to assess for LFT abnormalities or hepatotoxicity  Disposition  Final diagnoses:   Dizziness     Time reflects when diagnosis was documented in both MDM as applicable and the Disposition within this note     Time User Action Codes Description Comment    5/4/2023 11:20 AM Oliva Rodriguez Add [R42] Dizziness       ED Disposition     ED Disposition   Discharge    Condition   Stable    Date/Time   Thu May 4, 2023 11:20 AM    Comment   Gris Swanson discharge to home/self care  Follow-up Information     Follow up With Specialties Details Why Treva Blanton MD Family Medicine   [address] [place] [address]  [phone]            Patient's Medications   Discharge Prescriptions    MECLIZINE (ANTIVERT) 25 MG TABLET    Take 1 tablet (25 mg total) by mouth 3 (three) times a day as needed for dizziness       Start Date: 5/4/2023  End Date: --       Order Dose: 25 mg       Quantity: 30 tablet    Refills: 0       No discharge procedures on file      PDMP Review     None          ED Provider  Electronically Signed by           Destiney Roblero MD  05/04/23 1684

## 2023-05-05 ENCOUNTER — HOSPITAL ENCOUNTER (OUTPATIENT)
Dept: INFUSION CENTER | Facility: HOSPITAL | Age: 38
End: 2023-05-05
Attending: INTERNAL MEDICINE

## 2023-05-05 VITALS
TEMPERATURE: 97.2 F | RESPIRATION RATE: 22 BRPM | HEART RATE: 88 BPM | SYSTOLIC BLOOD PRESSURE: 113 MMHG | DIASTOLIC BLOOD PRESSURE: 80 MMHG | HEIGHT: 76 IN | BODY MASS INDEX: 38.11 KG/M2 | WEIGHT: 313 LBS

## 2023-05-05 DIAGNOSIS — C62.92 SEMINOMA OF LEFT TESTIS (HCC): ICD-10-CM

## 2023-05-05 DIAGNOSIS — D70.1 CHEMOTHERAPY INDUCED NEUTROPENIA (HCC): Primary | ICD-10-CM

## 2023-05-05 DIAGNOSIS — T45.1X5A CHEMOTHERAPY INDUCED NEUTROPENIA (HCC): Primary | ICD-10-CM

## 2023-05-05 RX ORDER — SODIUM CHLORIDE 9 MG/ML
20 INJECTION, SOLUTION INTRAVENOUS ONCE
Status: COMPLETED | OUTPATIENT
Start: 2023-05-05 | End: 2023-05-05

## 2023-05-05 RX ADMIN — SODIUM CHLORIDE 500 ML: 0.9 INJECTION, SOLUTION INTRAVENOUS at 10:35

## 2023-05-05 RX ADMIN — ETOPOSIDE 270 MG: 20 INJECTION INTRAVENOUS at 09:27

## 2023-05-05 RX ADMIN — ONDANSETRON: 2 INJECTION INTRAMUSCULAR; INTRAVENOUS at 08:38

## 2023-05-05 RX ADMIN — SODIUM CHLORIDE 20 ML/HR: 0.9 INJECTION, SOLUTION INTRAVENOUS at 08:37

## 2023-05-05 NOTE — PROGRESS NOTES
Patient tolerated IV chemotherapy without issue  Next appointment confirmed with sister, JARROD declined

## 2023-05-06 ENCOUNTER — HOSPITAL ENCOUNTER (OUTPATIENT)
Dept: INFUSION CENTER | Facility: CLINIC | Age: 38
Discharge: HOME | End: 2023-05-06

## 2023-05-06 VITALS — TEMPERATURE: 97.4 F

## 2023-05-06 DIAGNOSIS — C62.92 SEMINOMA OF LEFT TESTIS (HCC): ICD-10-CM

## 2023-05-06 DIAGNOSIS — D70.1 CHEMOTHERAPY INDUCED NEUTROPENIA (HCC): Primary | ICD-10-CM

## 2023-05-06 DIAGNOSIS — T45.1X5A CHEMOTHERAPY INDUCED NEUTROPENIA (HCC): Primary | ICD-10-CM

## 2023-05-06 RX ADMIN — PEGFILGRASTIM-BMEZ 6 MG: 6 INJECTION SUBCUTANEOUS at 14:52

## 2023-05-06 NOTE — PROGRESS NOTES
Patient here for ziextenzo 24 hours post chemotherapy, no changes or c/o to report  Patient tolerated injection to LIYAH by Peter Thompson as ordered, bandaid applied and CDI  Patient discharged and will RTO 5/22/23 for his next cycle

## 2023-05-06 NOTE — PATIENT INSTRUCTIONS
May 2023      [place] Monday Tuesday Wednesday Thursday Friday Saturday        1    INF ONCOLOGY TX-TREATMENT PLAN   8:00 AM   (440 min )    CHAIR 2   [address] 2    INF ONCOLOGY TX-TREATMENT PLAN   8:00 AM   (410 min )    CHAIR 2   [address] 3    INF ONCOLOGY TX-TREATMENT PLAN   8:00 AM   (410 min )    CHAIR 2   [address] 4    INF ONCOLOGY TX-TREATMENT PLAN   8:00 AM   (390 min )   [address]    Admission   8:36 AM   [facility] Emergency Department   (Discharge: 5/4/2023)    CT HEAD WO CONTRAST   9:40 AM   (30 min )    CT 1   [facility] CAT Scan    XR GENERAL PROCEDURE   9:45 AM   (15 min )    XR 1   [facility] Heart Radiology 5    INF ONCOLOGY TX-TREATMENT PLAN   8:00 AM   (410 min )    CHAIR 3   [address] 6    INF THERAPY PLAN   3:00 PM   (30 min )   AN INF CHEMO [facility]    Cycle 3, Day 1 Cycle 3, Day 2 Cycle 3, Day 3  Cycle 3, Day 5    7     8     9    CT ABDOMEN PELVIS WO CON  11:30 AM   (30 min )   EA CT 1   [facility] CT Scan 10     11    [facility] NEUROSURG PG  10:15 AM   (60 min )   Angy Barr MD   [facility] [place] 12     13                14     15     16     17    OFFICE VISIT LONG PG   9:45 AM   (20 min )   Carrillo Telles MD   [facility] [address], [address]. \[facility].\"" 18     19     20                21     22    INF ONCOLOGY TX-TREATMENT PLAN   8:00 AM   (210 min )   AN INF CHEMO [facility] 302 [facility] 23    INF ONCOLOGY TX-TREATMENT PLAN   9:30 AM   (210 min )   AN INF [place] 24    INF ONCOLOGY TX-TREATMENT PLAN   9:30 AM   (210 min )   AN INF CHEMO CHAIR     [address] 25    INF ONCOLOGY TX-TREATMENT PLAN   9:15 AM   (210 min )   AN INF CHEMO [address] UP PG  12:45 PM   (30 min )   Ramona Nunez MD   [facility] [place] 26    INF ONCOLOGY TX-TREATMENT PLAN   7:30 AM   (210 min )   AN INF CHEMO [facility] 27    INF THERAPY PLAN   3:00 PM   (30 min )   AN INF CHEMO CHAIR   [address]    Cycle 4, Day 1 Cycle 4, Day 2 Cycle 4, Day 3 Cycle 4, Day 4 Cycle 4, Day 5    28     29     30     31                                      Treatment Details         5/1/2023 - Cycle 3, Day 1      Chemotherapy: ONCBCN PROVIDER COMMUNICATION2, ONCBCN PROVIDER COMMUNICATION9, etoposide (Sariah Babe)    5/2/2023 - Cycle 3, Day 2      Chemotherapy: ONCBCN PROVIDER COMMUNICATION6, ONCBCN PROVIDER COMMUNICATION9, etoposide (Sariah Babe)    5/3/2023 - Cycle 3, Day 3      Chemotherapy: ONCBCN PROVIDER COMMUNICATION6, ONCBCN PROVIDER COMMUNICATION9, etoposide (Sariah Babe)    5/5/2023 - Cycle 3, Day 5      Chemotherapy: ONCBCN PROVIDER COMMUNICATION6, ONCBCN PROVIDER COMMUNICATION9, etoposide (TOPOSAR)    5/22/2023 - Cycle 4, Day 1      Chemotherapy: ONCBCN PROVIDER COMMUNICATION2, ONCBCN PROVIDER COMMUNICATION9, etoposide (TOPOSAR)    5/23/2023 - Cycle 4, Day 2      Chemotherapy: ONCBCN PROVIDER COMMUNICATION6, ONCBCN PROVIDER COMMUNICATION9, etoposide (TOPOSAR)    5/24/2023 - Cycle 4, Day 3      Chemotherapy: ONCBCN PROVIDER COMMUNICATION6, [address], etoposide (TOPOSAR)    5/25/2023 - Cycle 4, Day 4      Chemotherapy: ONCBCN PROVIDER COMMUNICATION6, ONCBCN PROVIDER COMMUNICATION9, etoposide (TOPOSAR)    5/26/2023 - Cycle 4, Day 5      Chemotherapy: [address], [address], etoposide (Sariah Babe)

## 2023-05-09 ENCOUNTER — HOSPITAL ENCOUNTER (OUTPATIENT)
Dept: CT IMAGING | Facility: HOSPITAL | Age: 38
Discharge: HOME | End: 2023-05-09

## 2023-05-09 DIAGNOSIS — C62.92 SEMINOMA OF LEFT TESTIS (HCC): ICD-10-CM

## 2023-05-11 ENCOUNTER — OFFICE VISIT (OUTPATIENT)
Dept: NEUROSURGERY | Facility: CLINIC | Age: 38
End: 2023-05-11

## 2023-05-11 VITALS
TEMPERATURE: 98 F | SYSTOLIC BLOOD PRESSURE: 138 MMHG | WEIGHT: 313 LBS | BODY MASS INDEX: 38.11 KG/M2 | DIASTOLIC BLOOD PRESSURE: 80 MMHG | HEIGHT: 76 IN | HEART RATE: 72 BPM | RESPIRATION RATE: 19 BRPM

## 2023-05-11 DIAGNOSIS — D33.3 UNILATERAL VESTIBULAR SCHWANNOMA (HCC): Primary | ICD-10-CM

## 2023-05-11 NOTE — PATIENT INSTRUCTIONS
Obtain MRI brain IAC with and without contrast and return to the office in 3 months time for review  Obtain lab work prior to MRI  Continue follow-up with ENT for hearing loss  Reach out to primary care provider to discuss water pill and if physical therapy is needed for gait training  none

## 2023-05-11 NOTE — ASSESSMENT & PLAN NOTE
Patient presents at the request of his PCP for the evaluation of vestibular schwannoma  · Found 3/2023 during work up for left ear hearing loss since 12/2022  · History of testicular cancer s/p left testicle resection, currently undergoing chemotherapy    Imaging:  · MRI brain IAC w wo contrast 3/15/2023: 3 mm focus of enhancement in the lateral aspect of the left IAC, possibly a mass lesion such as a schwannoma versus inflammation  Follow-up is recommended   Linear enhancement in the lateral aspect of the right IAC, possibly related to a vessel  T2 and FLAIR hyperintensities in the cerebral white matter, possibly signal changes related to migraines, mild small vessel disease, or nonspecific gliosis or demyelination  Plan:  • Imaging reviewed with patient and sister, demonstrates possible left vestibular schwannoma  Sharon Castorena of this discussed  Do not feel this is related to his known testicular cancer  Potentially the cause of his hearing issues  • Discussed treatment options including serial imaging, radiation and surgery  At this time, we will opt for serial imaging    • Plan for repeat MRI brain IAC w wo contrast in 3 months for comparison  o BUN and creatinine ordered to be completed beforehand  • He should continue follow up with ENT for serial hearing tests  • Patient is to return to the office sooner should patient develop worsening symptoms or red flag signs

## 2023-05-11 NOTE — PROGRESS NOTES
Mri braNeurosurgery Office Note  Maverick Weeks 41 y o  male MRN: [id number]      Assessment/Plan     Unilateral vestibular schwannoma [facility])  Patient presents at the request of his PCP for the evaluation of vestibular schwannoma  · Found 3/2023 during work up for left ear hearing loss since 12/2022  · History of testicular cancer s/p left testicle resection, currently undergoing chemotherapy    Imaging:  · MRI brain IAC w wo contrast 3/15/2023: 3 mm focus of enhancement in the lateral aspect of the left IAC, possibly a mass lesion such as a schwannoma versus inflammation  Follow-up is recommended   Linear enhancement in the lateral aspect of the right IAC, possibly related to a vessel  T2 and FLAIR hyperintensities in the cerebral white matter, possibly signal changes related to migraines, mild small vessel disease, or nonspecific gliosis or demyelination  Plan:  • Imaging reviewed with patient and sister, demonstrates possible left vestibular schwannoma  Axel Leavitt of this discussed  Do not feel this is related to his known testicular cancer  Potentially the cause of his hearing issues  • Discussed treatment options including serial imaging, radiation and surgery  At this time, we will opt for serial imaging  • Plan for repeat MRI brain IAC w wo contrast in 3 months for comparison  o BUN and creatinine ordered to be completed beforehand  • He should continue follow up with ENT for serial hearing tests  • Patient is to return to the office sooner should patient develop worsening symptoms or red flag signs       Diagnoses and all orders for this visit:    Unilateral vestibular schwannoma ([facility] [address] )  -     Ambulatory Referral to Neurosurgery  -     MRI brain IAC wo and w contrast; Future  -     BUN; Future  -     Creatinine, serum;  Future          I have spent a total time of 55 minutes on 05/11/23 in caring for this patient including Diagnostic results, Risks and benefits of tx options, Patient and family education, Importance of tx compliance, Impressions, Counseling / Coordination of care, Documenting in the medical record, Reviewing / ordering tests, medicine, procedures  , Obtaining or reviewing history   and Communicating with other healthcare professionals   CHIEF COMPLAINT    Chief Complaint   Patient presents with   • New Patient Visit       HISTORY    History of Present Illness     41y o  year old male with past medical history significant for testicular cancer status post left testicular resection and currently on chemotherapy, anxiety, depression, bipolar disorder who presents as a new consultation at the request of primary care provider for the evaluation of possible vestibular schwannoma  Patient is here with his sister  States in December 2022 he noted significant hearing loss in the left ear  He initially had his ear flushed in urgent care due to cerumen impaction but this did not help  He was previously working in lawn care and reported frequent exposure to loud noises which he thought may be the cause  He has been following with outside facility ENT  He has undergone hearing test which have noted persistent profound hearing loss in the left ear with moderate sloping to severe sensorineural hearing loss in the right ear  He also has associated headaches, dizziness, bilateral tinnitus and off-balance gait  He underwent an MRI which demonstrated a possible left vestibular schwannoma which may be the cause of his current hearing loss and was sent to neurosurgery for further evaluation  He also has some weakness in his legs, sister states he does not walk much and just lays around the house  Has pitting edema and she is not sure if he is compliant with his water pill  Patient is currently out of work secondary to testicular cancer  He has a positive family history of cancer in his father who had lung and bone cancer    He also has a cousin with hearing loss in the left ear as well       See Discussion    REVIEW OF SYSTEMS    Review of Systems   HENT: Positive for hearing loss (Left Ear ) and tinnitus (& ringing in b/l ears )  Audiometric eval in care everywhere   Eyes: Positive for visual disturbance (double vision-trouble focusing )  Gastrointestinal: Positive for nausea and vomiting (once in awhile )  Genitourinary: Positive for enuresis, frequency and urgency  Musculoskeletal: Positive for gait problem  Neurological: Positive for dizziness, speech difficulty (sometimes ), weakness (b/l legs and hands (dropping items)), light-headedness, numbness (fingertips ) and headaches  No previous sx     cc HA and nausea/vomitting (once in awhile) and Hearing changes ( L ear) audiometry done at [facility]- pt also c/o difficult walk & weakness b/l Legs & knees  w difficult speech *& urinary incontinence- sx present x 3mon     PT STATES HE HAD CHEMO 2023 SL FOR TESTICULAR CANCER AND HAD LEFT TESTICULAR REMOVED     All other systems reviewed and are negative  ROS obtained by MA  Reviewed  See HPI       Meds/Allergies     Current Outpatient Medications   Medication Sig Dispense Refill   • albuterol (ProAir HFA) 90 mcg/act inhaler Inhale 2 puffs every 6 (six) hours as needed for wheezing 8 5 g 0   • QUEtiapine (SEROquel) 25 mg tablet Take 1 tablet (25 mg total) by mouth daily at bedtime (Patient taking differently: Take 25 mg by mouth daily at bedtime PRN) 30 tablet 0   • amLODIPine (NORVASC) 10 mg tablet Take 1 tablet (10 mg total) by mouth daily (Patient not taking: Reported on 5/11/2023) 90 tablet 3   • FLUoxetine (PROzac) 10 mg capsule Take 1 capsule (10 mg total) by mouth daily (Patient not taking: Reported on 5/11/2023) 30 capsule 0   • losartan (COZAAR) 50 mg tablet Take 1 tablet (50 mg total) by mouth daily (Patient not taking: Reported on 5/11/2023) 90 tablet 3   • meclizine (ANTIVERT) 25 mg tablet Take 1 tablet (25 mg total) by mouth 3 (three) times a day as needed for "dizziness (Patient not taking: Reported on 5/11/2023) 30 tablet 0   • naproxen (Naprosyn) 500 mg tablet Take 1 tablet (500 mg total) by mouth 2 (two) times a day with meals (Patient not taking: Reported on 5/11/2023) 30 tablet 0   • tamsulosin (FLOMAX) 0 4 mg Take 1 capsule (0 4 mg total) by mouth daily with dinner (Patient not taking: Reported on 5/11/2023) 30 capsule 0   • torsemide (DEMADEX) 10 mg tablet Take 1 tablet (10 mg total) by mouth daily (Patient not taking: Reported on 5/11/2023) 90 tablet 3     No current facility-administered medications for this visit  No Known Allergies    PAST HISTORY    Past Medical History:   Diagnosis Date   • Anxiety    • Cancer [facility])    • Depression    • Psychiatric disorder     bipolar       Past Surgical History:   Procedure Laterality Date   • IR BIOPSY LYMPH NODE  1/20/2023   • IR PORT PLACEMENT  3/14/2023   • ORCHIECTOMY Left 12/14/2022    Procedure: ORCHIECTOMY INGUINAL;  Surgeon: Jeremiah Carr MD;  Location: [facility];  Service: Urology       Social History     Tobacco Use   • Smoking status: Every Day     Packs/day: 0 50     Types: Cigarettes     Start date: 1/1/2008   • Smokeless tobacco: Never   Vaping Use   • Vaping Use: Never used   Substance Use Topics   • Alcohol use: Not Currently     Comment: 2 cases of beer daily, stopped 3 years ago   • Drug use: Yes     Types: Marijuana     Comment: Medical marijuana       No family history on file  Above history personally reviewed  EXAM    Vitals:Blood pressure 138/80, pulse 72, temperature 98 °F (36 7 °C), temperature source Temporal, resp  rate 19, height 6' 3 79\" (1 925 m), weight (!) 142 kg (313 lb)  ,Body mass index is 38 31 kg/m²  Physical Exam  Constitutional:       General: He is awake  Appearance: Normal appearance  HENT:      Head: Normocephalic and atraumatic  Mouth/Throat:      Dentition: Abnormal dentition (edentulous )     Eyes:      Extraocular Movements: EOM normal     " Conjunctiva/sclera: Conjunctivae normal    Cardiovascular:      Rate and Rhythm: Normal rate  Pulmonary:      Effort: Pulmonary effort is normal  No respiratory distress  Skin:     General: Skin is warm and dry  Comments: Pitting edema in RLE   Neurological:      Mental Status: He is alert and oriented to person, place, and time  Coordination: Finger-Nose-Finger Test normal       Gait: Gait is intact  Psychiatric:         Attention and Perception: Attention and perception normal          Mood and Affect: Mood and affect normal          Speech: Speech is delayed  Behavior: Behavior normal  Behavior is cooperative  Thought Content: Thought content normal          Cognition and Memory: Memory normal  Cognition is impaired  Judgment: Judgment normal          Neurologic Exam     Mental Status   Oriented to person, place, and time  Follows 1 step commands  Attention: normal  Concentration: normal    Level of consciousness: alert  Knowledge: good  Normal comprehension  Cranial Nerves     CN III, IV, VI   Extraocular motions are normal    CN III: no CN III palsy  CN VI: no CN VI palsy  Nystagmus: none   Diplopia: none  Ophthalmoparesis: none  Upgaze: normal  Downgaze: normal  Conjugate gaze: present    CN V   Right facial sensation deficit: none  Left facial sensation deficit: none    CN VII   Right facial weakness: none  Left facial weakness: none    CN VIII   Hearing: impaired (unable to hear in left ear)    CN IX, X   CN IX normal    CN X normal      CN XI   Right trapezius strength: normal  Left trapezius strength: normal    CN XII   CN XII normal      Motor Exam   Muscle bulk: normal  Overall muscle tone: normal  Right arm pronator drift: absent  Left arm pronator drift: absent    Strength   Strength 5/5 except as noted     BLE weakness noted 4/5     Sensory Exam   Light touch normal      Gait, Coordination, and Reflexes     Gait  Gait: normal    Coordination   Finger to nose coordination: normal    Tremor   Resting tremor: absent  Intention tremor: absent  Action tremor: absent    Reflexes   Right : 2+  Left : 2+  Right Cornejo: absent  Left Cornejo: absent  Right ankle clonus: absent  Left ankle clonus: absent        MEDICAL DECISION MAKING    Imaging Studies:     XR chest 2 views    Result Date: 5/4/2023  Narrative: CHEST INDICATION:   sob  COMPARISON: CXR 12/10/2022 and chest CT 12/12/2022  EXAM PERFORMED/VIEWS:  XR CHEST PA & LATERAL  FINDINGS: Right port in lower SVC  Cardiomediastinal silhouette normal  No acute disease with mild bibasilar atelectasis, greater on the left  No effusion or pneumothorax  Upper abdomen normal  Bones normal for age  Old right clavicle fracture  Impression: Benign bibasilar linear atelectasis, greater on the left, with no acute disease  Workstation performed: [id number]     CT head without contrast    Result Date: 5/4/2023  Narrative: CT BRAIN - WITHOUT CONTRAST INDICATION:   vertigo, cancer treatment  COMPARISON: Outside CT head 3/8/2023 TECHNIQUE:  CT examination of the brain was performed  Multiplanar 2D reformatted images were created from the source data  Radiation dose length product (DLP) for this visit:  941 mGy-cm   This examination, like all CT scans performed in the [facility], was performed utilizing techniques to minimize radiation dose exposure, including the use of iterative reconstruction and automated exposure control  IMAGE QUALITY:  Diagnostic  FINDINGS: PARENCHYMA:  No intracranial mass, mass effect or midline shift  No CT signs of acute infarction  No acute parenchymal hemorrhage  Minimal nonspecific patchy left frontoparietal subcortical white matter hypodensity  VENTRICLES AND EXTRA-AXIAL SPACES:  Normal for the patient's age  VISUALIZED ORBITS: Normal visualized orbits  PARANASAL SINUSES: Trace mucosal thickening scattered in the paranasal sinuses   CALVARIUM AND EXTRACRANIAL SOFT TISSUES: Normal      Impression: No evidence of acute intracranial process  Stable minimal patchy left frontoparietal subcortical white matter disease  This is a nonspecific finding and may be sequela of precocious microangiopathy or demyelination among other etiologies  Workstation performed: [id number]       I have personally reviewed pertinent reports     and I have personally reviewed pertinent films in PACS

## 2023-05-15 DIAGNOSIS — C62.92 SEMINOMA OF LEFT TESTIS (HCC): ICD-10-CM

## 2023-05-15 DIAGNOSIS — D70.1 CHEMOTHERAPY INDUCED NEUTROPENIA: Primary | ICD-10-CM

## 2023-05-15 DIAGNOSIS — T45.1X5A CHEMOTHERAPY INDUCED NEUTROPENIA: Primary | ICD-10-CM

## 2023-05-15 RX ORDER — SODIUM CHLORIDE 9 MG/ML
20 INJECTION, SOLUTION INTRAVENOUS ONCE
Status: CANCELLED | OUTPATIENT
Start: 2023-05-22

## 2023-05-15 RX ORDER — SODIUM CHLORIDE 9 MG/ML
20 INJECTION, SOLUTION INTRAVENOUS ONCE
Status: CANCELLED | OUTPATIENT
Start: 2023-05-24

## 2023-05-15 RX ORDER — MAGNESIUM SULFATE HEPTAHYDRATE 40 MG/ML
2 INJECTION, SOLUTION INTRAVENOUS ONCE
Status: CANCELLED
Start: 2023-05-23 | End: 2023-05-23

## 2023-05-15 RX ORDER — SODIUM CHLORIDE 9 MG/ML
20 INJECTION, SOLUTION INTRAVENOUS ONCE
Status: CANCELLED | OUTPATIENT
Start: 2023-05-23

## 2023-05-15 RX ORDER — SODIUM CHLORIDE 9 MG/ML
20 INJECTION, SOLUTION INTRAVENOUS ONCE
Status: CANCELLED | OUTPATIENT
Start: 2023-05-26

## 2023-05-15 RX ORDER — SODIUM CHLORIDE 9 MG/ML
20 INJECTION, SOLUTION INTRAVENOUS ONCE
Status: CANCELLED | OUTPATIENT
Start: 2023-05-25

## 2023-05-17 ENCOUNTER — OFFICE VISIT (OUTPATIENT)
Dept: FAMILY MEDICINE CLINIC | Facility: CLINIC | Age: 38
End: 2023-05-17

## 2023-05-17 ENCOUNTER — TELEPHONE (OUTPATIENT)
Dept: HEMATOLOGY ONCOLOGY | Facility: CLINIC | Age: 38
End: 2023-05-17

## 2023-05-17 VITALS
DIASTOLIC BLOOD PRESSURE: 70 MMHG | RESPIRATION RATE: 20 BRPM | HEIGHT: 76 IN | WEIGHT: 315 LBS | OXYGEN SATURATION: 97 % | SYSTOLIC BLOOD PRESSURE: 110 MMHG | HEART RATE: 78 BPM | TEMPERATURE: 97 F | BODY MASS INDEX: 38.36 KG/M2

## 2023-05-17 DIAGNOSIS — D33.3 UNILATERAL VESTIBULAR SCHWANNOMA (HCC): ICD-10-CM

## 2023-05-17 DIAGNOSIS — D70.1 CHEMOTHERAPY INDUCED NEUTROPENIA (HCC): ICD-10-CM

## 2023-05-17 DIAGNOSIS — T45.1X5A CHEMOTHERAPY INDUCED NEUTROPENIA (HCC): ICD-10-CM

## 2023-05-17 DIAGNOSIS — G93.89 CEREBRAL GLIOSIS: ICD-10-CM

## 2023-05-17 DIAGNOSIS — I50.21 ACUTE SYSTOLIC CONGESTIVE HEART FAILURE (HCC): Primary | ICD-10-CM

## 2023-05-17 DIAGNOSIS — F31.9 BIPOLAR DISORDER WITH DEPRESSION (HCC): ICD-10-CM

## 2023-05-17 DIAGNOSIS — N50.89 TESTICULAR MASS: ICD-10-CM

## 2023-05-17 DIAGNOSIS — K42.9 UMBILICAL HERNIA WITHOUT OBSTRUCTION AND WITHOUT GANGRENE: ICD-10-CM

## 2023-05-17 DIAGNOSIS — C62.92 SEMINOMA OF LEFT TESTIS (HCC): ICD-10-CM

## 2023-05-17 RX ORDER — POTASSIUM CHLORIDE 750 MG/1
10 TABLET, EXTENDED RELEASE ORAL DAILY
Qty: 90 TABLET | Refills: 0 | Status: SHIPPED | OUTPATIENT
Start: 2023-05-17

## 2023-05-17 RX ORDER — FLUOXETINE 10 MG/1
10 CAPSULE ORAL DAILY
Qty: 30 CAPSULE | Refills: 0 | Status: SHIPPED | OUTPATIENT
Start: 2023-05-17

## 2023-05-17 RX ORDER — TORSEMIDE 20 MG/1
20 TABLET ORAL DAILY
Qty: 90 TABLET | Refills: 0 | Status: SHIPPED | OUTPATIENT
Start: 2023-05-17

## 2023-05-17 RX ORDER — QUETIAPINE FUMARATE 25 MG/1
25 TABLET, FILM COATED ORAL
Qty: 30 TABLET | Refills: 0 | Status: SHIPPED | OUTPATIENT
Start: 2023-05-17

## 2023-05-17 NOTE — TELEPHONE ENCOUNTER
Patient Call    Who are you speaking with? Patient    If it is not the patient, are they listed on an active communication consent form? N/A   What is the reason for this call? Patient states he needs a letter  faxed over to [email or web address]   stating he was unable to go to   court due to his DX, The patient   would like a call when this is   completed so he is aware they   received it  [place]:   Fax: [phone]   Does this require a call back? Yes   If a call back is required, please list best call back number Yes  [phone]   If a call back is required, advise that a message will be forwarded to their care team and someone will return their call as soon as possible  Did you relay this information to the patient?  Yes

## 2023-05-17 NOTE — PROGRESS NOTES
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,currently on chemotherapy,, hypertension, hyponatremia,Bipolar 1  presents with his older sister and aunt today for follow up  He is a poor historian at this time, sister and aunt gave history independently, lives with his children and their mother and sister checks on him frequently    He has been seeing ENT for sudden left sided hearing loss , saw neurosurgery APN- and serial monitoring was recommended instead of radiation or surgery per sister    He does not say much during the visit, but overall is doing ok, stopped taking all his medication, last chemo next week  Has hernia on his abdomen, gaining weight, ankles swollen, short of breath after walking few feet        Past Medical History:   Diagnosis Date   • Anxiety    • Cancer [facility])    • Depression    • Psychiatric disorder     bipolar       History reviewed  No pertinent family history  Past Surgical History:   Procedure Laterality Date   • IR BIOPSY LYMPH NODE  1/20/2023   • IR PORT PLACEMENT  3/14/2023   • ORCHIECTOMY Left 12/14/2022    Procedure: ORCHIECTOMY INGUINAL;  Surgeon: Anisa Griffiths MD;  Location: [facility];  Service: Urology        reports that he has been smoking cigarettes  He started smoking about 15 years ago  He has been smoking an average of  5 packs per day  He has never used smokeless tobacco  He reports that he does not currently use alcohol  He reports current drug use  Drug: Marijuana        Current Outpatient Medications:   •  albuterol (ProAir HFA) 90 mcg/act inhaler, Inhale 2 puffs every 6 (six) hours as needed for wheezing, Disp: 8 5 g, Rfl: 0  •  FLUoxetine (PROzac) 10 mg capsule, Take 1 capsule (10 mg total) by mouth daily, Disp: 30 capsule, Rfl: 0  •  meclizine (ANTIVERT) 25 mg tablet, Take 1 tablet (25 mg total) by mouth 3 (three) times a day as needed for dizziness, Disp: 30 tablet, Rfl: 0  •  potassium chloride (K-DUR,KLOR-CON) 10 mEq tablet, Take 1 tablet (10 mEq total) by mouth daily, Disp: 90 tablet, Rfl: 0  •  QUEtiapine (SEROquel) 25 mg tablet, Take 1 tablet (25 mg total) by mouth daily at bedtime, Disp: 30 tablet, Rfl: 0  •  torsemide (DEMADEX) 20 mg tablet, Take 1 tablet (20 mg total) by mouth daily, Disp: 90 tablet, Rfl: 0  •  tamsulosin (FLOMAX) 0 4 mg, Take 1 capsule (0 4 mg total) by mouth daily with dinner (Patient not taking: Reported on 2023), Disp: 30 capsule, Rfl: 0    The following portions of the patient's history were reviewed and updated as appropriate: allergies, current medications, past family history, past medical history, past social history, past surgical history and problem list     Review of Systems   Constitutional: Negative for chills and fever  HENT: Negative for congestion, rhinorrhea and sore throat  Eyes: Negative for discharge, redness and itching  Respiratory: Positive for shortness of breath  Negative for chest tightness and wheezing  Cardiovascular: Positive for leg swelling  Negative for chest pain and palpitations  Gastrointestinal: Positive for abdominal distention  Negative for abdominal pain, constipation and diarrhea  Genitourinary: Negative for dysuria  Skin: Negative for pallor and rash  Neurological: Negative for dizziness, weakness, numbness and headaches           PHQ-2/9 Depression Screening    Little interest or pleasure in doing things: 0 - not at all  Feeling down, depressed, or hopeless: 0 - not at all  Trouble falling or staying asleep, or sleeping too much: 0 - not at all  Feeling tired or having little energy: 0 - not at all  Poor appetite or overeatin - not at all  Feeling bad about yourself - or that you are a failure or have let yourself or your family down: 0 - not at all  Trouble concentrating on things, such as reading the newspaper or watching television: 0 - not at all  Moving or speaking so slowly that other "people could have noticed  Or the opposite - being so fidgety or restless that you have been moving around a lot more than usual: 0 - not at all  Thoughts that you would be better off dead, or of hurting yourself in some way: 0 - not at all  PHQ-2 Score: 0  PHQ-2 Interpretation: Negative depression screen  PHQ-9 Score: 0   PHQ-9 Interpretation: No or Minimal depression              Objective:    /70 (BP Location: Right arm, Patient Position: Sitting, Cuff Size: Large)   Pulse 78   Temp (!) 97 °F (36 1 °C) (Tympanic)   Resp 20   Ht 6' 3 75\" (1 924 m)   Wt (!) 144 kg (318 lb)   SpO2 97%   BMI 38 96 kg/m²      Physical Exam  Vitals and nursing note reviewed  Constitutional:       Appearance: Normal appearance  He is obese  HENT:      Head: Normocephalic and atraumatic  Right Ear: Tympanic membrane, ear canal and external ear normal       Left Ear: Tympanic membrane, ear canal and external ear normal       Nose: No congestion or rhinorrhea  Mouth/Throat:      Pharynx: No oropharyngeal exudate or posterior oropharyngeal erythema  Eyes:      General:         Right eye: No discharge  Left eye: No discharge  Conjunctiva/sclera: Conjunctivae normal    Cardiovascular:      Rate and Rhythm: Normal rate and regular rhythm  Heart sounds: No murmur heard  Pulmonary:      Effort: Pulmonary effort is normal       Breath sounds: Rales present  No wheezing  Abdominal:      General: There is distension  Palpations: Abdomen is soft  Tenderness: There is no abdominal tenderness  There is no right CVA tenderness or left CVA tenderness  Hernia: A hernia (umbilican hernia, mainly fluid filled, no intestinal organ felt) is present  Comments: Several stretch marks on abdomen     Musculoskeletal:      Right lower leg: Edema (2+) present  Left lower leg: Edema (2+ ) present  Skin:     Findings: No lesion or rash  Neurological:      Mental Status: He is alert   Mental " status is at baseline  Psychiatric:         Mood and Affect: Mood normal          Thought Content:  Thought content normal            Recent Results (from the past 8736 hour(s))   CBC and differential    Collection Time: 07/19/22  9:13 PM   Result Value Ref Range    WBC 13 87 (H) 4 31 - 10 16 Thousand/uL    RBC 4 70 3 88 - 5 62 Million/uL    Hemoglobin 14 9 12 0 - 17 0 g/dL    Hematocrit 43 3 36 5 - 49 3 %    MCV 92 82 - 98 fL    MCH 31 7 26 8 - 34 3 pg    MCHC 34 4 31 4 - 37 4 g/dL    RDW 13 9 11 6 - 15 1 %    MPV 9 7 8 9 - 12 7 fL    Platelets 085 200 - 856 Thousands/uL    nRBC 0 /100 WBCs    Neutrophils Relative 69 43 - 75 %    Immat GRANS % 0 0 - 2 %    Lymphocytes Relative 24 14 - 44 %    Monocytes Relative 6 4 - 12 %    Eosinophils Relative 1 0 - 6 %    Basophils Relative 0 0 - 1 %    Neutrophils Absolute 9 45 (H) 1 85 - 7 62 Thousands/µL    Immature Grans Absolute 0 06 0 00 - 0 20 Thousand/uL    Lymphocytes Absolute 3 37 0 60 - 4 47 Thousands/µL    Monocytes Absolute 0 78 0 17 - 1 22 Thousand/µL    Eosinophils Absolute 0 16 0 00 - 0 61 Thousand/µL    Basophils Absolute 0 05 0 00 - 0 10 Thousands/µL   Comprehensive metabolic panel    Collection Time: 07/19/22  9:13 PM   Result Value Ref Range    Sodium 134 (L) 135 - 147 mmol/L    Potassium 4 4 3 5 - 5 3 mmol/L    Chloride 101 96 - 108 mmol/L    CO2 27 21 - 32 mmol/L    ANION GAP 6 4 - 13 mmol/L    BUN 13 5 - 25 mg/dL    Creatinine 0 97 0 60 - 1 30 mg/dL    Glucose 92 65 - 140 mg/dL    Calcium 9 0 8 4 - 10 2 mg/dL    AST 25 13 - 39 U/L    ALT 19 7 - 52 U/L    Alkaline Phosphatase 61 34 - 104 U/L    Total Protein 7 4 6 4 - 8 4 g/dL    Albumin 4 1 3 5 - 5 0 g/dL    Total Bilirubin 0 48 0 20 - 1 00 mg/dL    eGFR 100 ml/min/1 73sq m   TSH    Collection Time: 07/19/22  9:13 PM   Result Value Ref Range    TSH 3RD GENERATON 3 528 0 450 - 4 500 uIU/mL   FLU/RSV/COVID - if FLU/RSV clinically relevant    Collection Time: 07/19/22  9:13 PM    Specimen: Nose; Nares Result Value Ref Range    SARS-CoV-2 Negative Negative    INFLUENZA A PCR Negative Negative    INFLUENZA B PCR Negative Negative    RSV PCR Negative Negative   Lyme Total Antibody Profile with reflex to WB    Collection Time: 07/19/22  9:13 PM   Result Value Ref Range    Lyme Total Antibodies 4 9 (H) 0 2 - 8 0 AI   Vitamin B12    Collection Time: 07/19/22  9:13 PM   Result Value Ref Range    Vitamin B-12 675 100 - 900 pg/mL   Folate    Collection Time: 07/19/22  9:13 PM   Result Value Ref Range    Folate 10 1 3 1 - 17 5 ng/mL   Magnesium    Collection Time: 07/19/22  9:13 PM   Result Value Ref Range    Magnesium 2 6 1 9 - 2 7 mg/dL   CK    Collection Time: 07/19/22  9:13 PM   Result Value Ref Range    Total  39 - 308 U/L   Babesia microti antibody, IgG & Igm    Collection Time: 07/19/22  9:13 PM   Result Value Ref Range    Babesia microti IgG <1:10 Neg:<1:10    Babesia microti IgM <1:10 Neg:<1:10   Lyme Western Blot, Serum    Collection Time: 07/19/22  9:13 PM   Result Value Ref Range    Lyme 18 kD IgG Absent     Lyme 23 kD IgG Absent     Lyme 28 kD IgG Absent     Lyme 30 kD IgG Absent     Lyme 39 kD IgG Absent     Lyme 41 kD IgG Absent     Lyme 45 kD IgG Absent     Lyme 58 kD IgG Absent     Lyme 66 kD IgG Absent     Lyme 93 kD IgG Absent     Lyme 23 kD IgM Present (A)     Lyme 39 kD IgM Absent     Lyme 41 kD IgM Absent     Lyme IgG WB Interp  Negative     Lyme IgM WB Interp   Negative    CBC and differential    Collection Time: 12/10/22  5:53 PM   Result Value Ref Range    WBC 10 46 (H) 4 31 - 10 16 Thousand/uL    RBC 4 39 3 88 - 5 62 Million/uL    Hemoglobin 14 6 12 0 - 17 0 g/dL    Hematocrit 41 1 36 5 - 49 3 %    MCV 94 82 - 98 fL    MCH 33 3 26 8 - 34 3 pg    MCHC 35 5 31 4 - 37 4 g/dL    RDW 12 4 11 6 - 15 1 %    MPV 8 8 (L) 8 9 - 12 7 fL    Platelets 157 595 - 955 Thousands/uL    nRBC 0 /100 WBCs    Neutrophils Relative 63 43 - 75 %    Immat GRANS % 1 0 - 2 %    Lymphocytes Relative 25 14 - 44 % Monocytes Relative 8 4 - 12 %    Eosinophils Relative 2 0 - 6 %    Basophils Relative 1 0 - 1 %    Neutrophils Absolute 6 66 1 85 - 7 62 Thousands/µL    Immature Grans Absolute 0 14 0 00 - 0 20 Thousand/uL    Lymphocytes Absolute 2 61 0 60 - 4 47 Thousands/µL    Monocytes Absolute 0 80 0 17 - 1 22 Thousand/µL    Eosinophils Absolute 0 17 0 00 - 0 61 Thousand/µL    Basophils Absolute 0 08 0 00 - 0 10 Thousands/µL   Hepatic function panel    Collection Time: 12/10/22  5:53 PM   Result Value Ref Range    Total Bilirubin 0 34 0 20 - 1 00 mg/dL    Bilirubin, Direct 0 07 0 00 - 0 20 mg/dL    Alkaline Phosphatase 92 34 - 104 U/L    AST 23 13 - 39 U/L    ALT 51 7 - 52 U/L    Total Protein 7 4 6 4 - 8 4 g/dL    Albumin 4 3 3 5 - 5 0 g/dL   Basic metabolic panel    Collection Time: 12/10/22  5:53 PM   Result Value Ref Range    Sodium 129 (L) 135 - 147 mmol/L    Potassium 3 6 3 5 - 5 3 mmol/L    Chloride 92 (L) 96 - 108 mmol/L    CO2 29 21 - 32 mmol/L    ANION GAP 8 4 - 13 mmol/L    BUN 13 5 - 25 mg/dL    Creatinine 0 88 0 60 - 1 30 mg/dL    Glucose 86 65 - 140 mg/dL    Calcium 9 4 8 4 - 10 2 mg/dL    eGFR 111 ml/min/1 73sq m   Protime-INR    Collection Time: 12/10/22  5:53 PM   Result Value Ref Range    Protime 12 3 11 6 - 14 5 seconds    INR 0 88 0 84 - 1 19   APTT    Collection Time: 12/10/22  5:53 PM   Result Value Ref Range    PTT 25 23 - 37 seconds   TSH    Collection Time: 12/10/22  5:53 PM   Result Value Ref Range    TSH 3RD GENERATON 1 295 0 450 - 4 500 uIU/mL   Magnesium    Collection Time: 12/10/22  5:53 PM   Result Value Ref Range    Magnesium 2 0 1 9 - 2 7 mg/dL   Phosphorus    Collection Time: 12/10/22  5:53 PM   Result Value Ref Range    Phosphorus 3 3 2 7 - 4 5 mg/dL   Lipase    Collection Time: 12/10/22  5:53 PM   Result Value Ref Range    Lipase 40 11 - 82 u/L   Lactic acid    Collection Time: 12/10/22  5:53 PM   Result Value Ref Range    LACTIC ACID 0 9 0 5 - 2 0 mmol/L   B-Type Natriuretic Peptide(BNP) AN, "CA, [facility] Only    Collection Time: 12/10/22  5:53 PM   Result Value Ref Range    BNP 7 0 - 100 pg/mL   UA (URINE) with reflex to Scope    Collection Time: 12/10/22  5:53 PM   Result Value Ref Range    Color, UA Yellow Yellow    Clarity, UA Clear Clear    Specific Gravity, UA 1 020 1 001 - 1 030    pH, UA 6 0 5 0, 5 5, 6 0, 6 5, 7 0, 7 5, 8 0    Leukocytes, UA Negative Negative    Nitrite, UA Negative Negative    Protein, UA Negative Negative, Interference- unable to analyze mg/dl    Glucose, UA Negative Negative mg/dl    Ketones, UA Negative Negative mg/dl    Urobilinogen, UA 0 2 0 2, 1 0 E U /dl E U /dl    Bilirubin, UA Negative Negative    Occult Blood, UA Negative Negative   Uric acid    Collection Time: 12/10/22  5:53 PM   Result Value Ref Range    Uric Acid 3 4 (L) 3 5 - 8 5 mg/dL   Hemoglobin A1C    Collection Time: 12/10/22  5:53 PM   Result Value Ref Range    Hemoglobin A1C 5 2 Normal 3 8-5 6%; PreDiabetic 5 7-6 4%;  Diabetic >=6 5%; Glycemic control for adults with diabetes <7 0% %     mg/dl   Lipid panel    Collection Time: 12/10/22  5:53 PM   Result Value Ref Range    Cholesterol 183 See Comment mg/dL    Triglycerides 280 (H) See Comment mg/dL    HDL, Direct 45 >=40 mg/dL    LDL Calculated 82 0 - 100 mg/dL    Non-HDL-Chol (CHOL-HDL) 138 mg/dl   ECG 12 lead    Collection Time: 12/10/22  6:06 PM   Result Value Ref Range    Ventricular Rate 98 BPM    Atrial Rate 98 BPM    FL Interval 164 ms    QRSD Interval 88 ms    QT Interval 354 ms    QTC Interval 451 ms    P Axis 50 degrees    QRS Axis 79 degrees    T Wave Voss 48 degrees   HS Troponin 0hr (reflex protocol)    Collection Time: 12/10/22 10:43 PM   Result Value Ref Range    hs TnI 0hr 3 \"Refer to ACS Flowchart\"- see link ng/L   COVID/FLU/RSV    Collection Time: 12/10/22 10:46 PM    Specimen: Nose; Nares   Result Value Ref Range    SARS-CoV-2 Negative Negative    INFLUENZA A PCR Negative Negative    INFLUENZA B PCR Negative Negative    RSV PCR " "Negative Negative   Sodium, urine, random    Collection Time: 12/11/22 12:07 AM   Result Value Ref Range    Sodium, Ur 73    Osmolality, urine    Collection Time: 12/11/22 12:07 AM   Result Value Ref Range    Osmolality, Ur 729 250 - 900 mmol/KG   HS Troponin I 2hr    Collection Time: 12/11/22 12:44 AM   Result Value Ref Range    hs TnI 2hr 3 \"Refer to ACS Flowchart\"- see link ng/L    Delta 2hr hsTnI 0 <20 ng/L   Cortisol Level, AM Specimen    Collection Time: 12/11/22  4:44 AM   Result Value Ref Range    Cortisol - AM 5 6 4 2 - 22 4 ug/dL   AFP tumor marker    Collection Time: 12/11/22  4:44 AM   Result Value Ref Range    AFP TUMOR MARKER 1 4 0 5 - 8 ng/mL   Lactate dehydrogenase    Collection Time: 12/11/22  4:44 AM   Result Value Ref Range     140 - 271 U/L   hCG, serum, qualitative    Collection Time: 12/11/22  4:44 AM   Result Value Ref Range    Preg, Serum Negative Negative   Basic metabolic panel    Collection Time: 12/11/22  4:44 AM   Result Value Ref Range    Sodium 131 (L) 135 - 147 mmol/L    Potassium 3 5 3 5 - 5 3 mmol/L    Chloride 97 96 - 108 mmol/L    CO2 24 21 - 32 mmol/L    ANION GAP 10 4 - 13 mmol/L    BUN 12 5 - 25 mg/dL    Creatinine 0 73 0 60 - 1 30 mg/dL    Glucose 91 65 - 140 mg/dL    Calcium 8 9 8 4 - 10 2 mg/dL    eGFR 120 ml/min/1 73sq m   CBC and differential    Collection Time: 12/11/22  4:44 AM   Result Value Ref Range    WBC 9 50 4 31 - 10 16 Thousand/uL    RBC 4 22 3 88 - 5 62 Million/uL    Hemoglobin 14 0 12 0 - 17 0 g/dL    Hematocrit 39 4 36 5 - 49 3 %    MCV 93 82 - 98 fL    MCH 33 2 26 8 - 34 3 pg    MCHC 35 5 31 4 - 37 4 g/dL    RDW 12 8 11 6 - 15 1 %    MPV 9 2 8 9 - 12 7 fL    Platelets 170 380 - 109 Thousands/uL    nRBC 0 /100 WBCs    Neutrophils Relative 62 43 - 75 %    Immat GRANS % 1 0 - 2 %    Lymphocytes Relative 27 14 - 44 %    Monocytes Relative 7 4 - 12 %    Eosinophils Relative 2 0 - 6 %    Basophils Relative 1 0 - 1 %    Neutrophils Absolute 5 88 1 85 - 7 62 " "Thousands/µL    Immature Grans Absolute 0 12 0 00 - 0 20 Thousand/uL    Lymphocytes Absolute 2 56 0 60 - 4 47 Thousands/µL    Monocytes Absolute 0 69 0 17 - 1 22 Thousand/µL    Eosinophils Absolute 0 19 0 00 - 0 61 Thousand/µL    Basophils Absolute 0 06 0 00 - 0 10 Thousands/µL   D-dimer, quantitative    Collection Time: 12/11/22  7:57 PM   Result Value Ref Range    D-Dimer, Quant <0 27 <0 50 ug/ml FEU   Osmolality-\"If this is regarding a toxic alcohol, STOP  Test is not routinely indicated  Please consult medical  on call for further guidance  \"    Collection Time: 12/12/22  4:34 AM   Result Value Ref Range    Osmolality Serum 275 (L) 282 - 298 mmol/KG   CBC    Collection Time: 12/12/22  4:34 AM   Result Value Ref Range    WBC 9 71 4 31 - 10 16 Thousand/uL    RBC 4 22 3 88 - 5 62 Million/uL    Hemoglobin 14 2 12 0 - 17 0 g/dL    Hematocrit 40 0 36 5 - 49 3 %    MCV 95 82 - 98 fL    MCH 33 6 26 8 - 34 3 pg    MCHC 35 5 31 4 - 37 4 g/dL    RDW 12 5 11 6 - 15 1 %    Platelets 937 731 - 388 Thousands/uL    MPV 9 1 8 9 - 12 7 fL   Basic metabolic panel    Collection Time: 12/12/22  4:34 AM   Result Value Ref Range    Sodium 129 (L) 135 - 147 mmol/L    Potassium 3 9 3 5 - 5 3 mmol/L    Chloride 94 (L) 96 - 108 mmol/L    CO2 25 21 - 32 mmol/L    ANION GAP 10 4 - 13 mmol/L    BUN 10 5 - 25 mg/dL    Creatinine 0 76 0 60 - 1 30 mg/dL    Glucose 89 65 - 140 mg/dL    Calcium 9 4 8 4 - 10 2 mg/dL    eGFR 118 ml/min/1 73sq m   HCG, tumor marker    Collection Time: 12/12/22  4:34 AM   Result Value Ref Range    hCG Tumor Marker <2 <5 mlU/mL   Echo complete w/ contrast if indicated    Collection Time: 12/12/22 12:36 PM   Result Value Ref Range    LA size 3 7 cm    LVPWd 1 20 cm    Left Atrium Area-systolic Four Chamber 56 5 cm2    MV E' Tissue Velocity Septal 9 cm/s    RA 2D Volume 22 0 mL    IVSd 6 68 cm    LV DIASTOLIC VOLUME (MOD BIPLANE) 2D 68 mL    LEFT VENTRICLE SYSTOLIC VOLUME (MOD BIPLANE) 2D 30 mL    Left " ventricular stroke volume (2D) 37 00 mL    A4C EF 66 %    LVIDd 3 90 cm    IVS 1 2 cm    LVIDS 2 80 cm    FS 28 28 - 44 %    Ao root 3 20 cm    RVID d 2 6 cm    MV valve area p 1/2 method 3 55 cm2    E wave deceleration time 214 ms    MV Peak E Tono 65 cm/s    MV Peak A Tono 0 53 m/s    RAA A4C 11 1 cm2    MV stenosis pressure 1/2 time 62 ms    LVSV, 2D 37 mL    LV EF 60    Basic metabolic panel    Collection Time: 12/13/22  4:41 AM   Result Value Ref Range    Sodium 127 (L) 135 - 147 mmol/L    Potassium 3 9 3 5 - 5 3 mmol/L    Chloride 94 (L) 96 - 108 mmol/L    CO2 24 21 - 32 mmol/L    ANION GAP 9 4 - 13 mmol/L    BUN 14 5 - 25 mg/dL    Creatinine 0 78 0 60 - 1 30 mg/dL    Glucose 91 65 - 140 mg/dL    Calcium 9 1 8 4 - 10 2 mg/dL    eGFR 116 ml/min/1 73sq m   CBC and differential    Collection Time: 12/13/22  4:41 AM   Result Value Ref Range    WBC 10 03 4 31 - 10 16 Thousand/uL    RBC 4 17 3 88 - 5 62 Million/uL    Hemoglobin 13 9 12 0 - 17 0 g/dL    Hematocrit 39 3 36 5 - 49 3 %    MCV 94 82 - 98 fL    MCH 33 3 26 8 - 34 3 pg    MCHC 35 4 31 4 - 37 4 g/dL    RDW 12 5 11 6 - 15 1 %    MPV 9 0 8 9 - 12 7 fL    Platelets 808 812 - 008 Thousands/uL    nRBC 0 /100 WBCs    Neutrophils Relative 69 43 - 75 %    Immat GRANS % 1 0 - 2 %    Lymphocytes Relative 21 14 - 44 %    Monocytes Relative 8 4 - 12 %    Eosinophils Relative 1 0 - 6 %    Basophils Relative 0 0 - 1 %    Neutrophils Absolute 6 82 1 85 - 7 62 Thousands/µL    Immature Grans Absolute 0 12 0 00 - 0 20 Thousand/uL    Lymphocytes Absolute 2 13 0 60 - 4 47 Thousands/µL    Monocytes Absolute 0 78 0 17 - 1 22 Thousand/µL    Eosinophils Absolute 0 14 0 00 - 0 61 Thousand/µL    Basophils Absolute 0 04 0 00 - 0 10 Thousands/µL   Basic metabolic panel    Collection Time: 12/13/22 12:26 PM   Result Value Ref Range    Sodium 125 (L) 135 - 147 mmol/L    Potassium 3 6 3 5 - 5 3 mmol/L    Chloride 92 (L) 96 - 108 mmol/L    CO2 24 21 - 32 mmol/L    ANION GAP 9 4 - 13 mmol/L BUN 12 5 - 25 mg/dL    Creatinine 0 77 0 60 - 1 30 mg/dL    Glucose 114 65 - 140 mg/dL    Calcium 9 2 8 4 - 10 2 mg/dL    eGFR 117 ml/min/1 73sq m   Cortisol    Collection Time: 12/13/22  2:31 PM   Result Value Ref Range    Cortisol, Random 4 1 ug/dL   ACTH    Collection Time: 12/13/22  2:31 PM   Result Value Ref Range    ACTH 36 2 7 2 - 63 3 pg/mL   Cortisol Stimulation Test    Collection Time: 12/13/22  2:31 PM   Result Value Ref Range    Cortisol, Random 17 1 ug/dL   Cortisol    Collection Time: 12/13/22  4:40 PM   Result Value Ref Range    Cortisol, Random 22 4 ug/dL   Basic metabolic panel    Collection Time: 12/13/22 11:53 PM   Result Value Ref Range    Sodium 129 (L) 135 - 147 mmol/L    Potassium 3 5 3 5 - 5 3 mmol/L    Chloride 95 (L) 96 - 108 mmol/L    CO2 24 21 - 32 mmol/L    ANION GAP 10 4 - 13 mmol/L    BUN 16 5 - 25 mg/dL    Creatinine 0 88 0 60 - 1 30 mg/dL    Glucose 111 65 - 140 mg/dL    Calcium 9 0 8 3 - 10 1 mg/dL    eGFR 111 ml/min/1 73sq m   Basic metabolic panel    Collection Time: 12/14/22  4:32 AM   Result Value Ref Range    Sodium 130 (L) 135 - 147 mmol/L    Potassium 3 5 3 5 - 5 3 mmol/L    Chloride 94 (L) 96 - 108 mmol/L    CO2 28 21 - 32 mmol/L    ANION GAP 8 4 - 13 mmol/L    BUN 14 5 - 25 mg/dL    Creatinine 0 85 0 60 - 1 30 mg/dL    Glucose 105 65 - 140 mg/dL    Calcium 9 2 8 3 - 10 1 mg/dL    eGFR 112 ml/min/1 73sq m   Magnesium    Collection Time: 12/14/22  4:32 AM   Result Value Ref Range    Magnesium 2 6 1 6 - 2 6 mg/dL   CBC and differential    Collection Time: 12/14/22  4:32 AM   Result Value Ref Range    WBC 11 31 (H) 4 31 - 10 16 Thousand/uL    RBC 4 38 3 88 - 5 62 Million/uL    Hemoglobin 14 6 12 0 - 17 0 g/dL    Hematocrit 41 2 36 5 - 49 3 %    MCV 94 82 - 98 fL    MCH 33 3 26 8 - 34 3 pg    MCHC 35 4 31 4 - 37 4 g/dL    RDW 12 6 11 6 - 15 1 %    MPV 8 8 (L) 8 9 - 12 7 fL    Platelets 660 749 - 264 Thousands/uL    nRBC 0 /100 WBCs    Neutrophils Relative 73 43 - 75 % Immat GRANS % 1 0 - 2 %    Lymphocytes Relative 17 14 - 44 %    Monocytes Relative 7 4 - 12 %    Eosinophils Relative 1 0 - 6 %    Basophils Relative 1 0 - 1 %    Neutrophils Absolute 8 23 (H) 1 85 - 7 62 Thousands/µL    Immature Grans Absolute 0 11 0 00 - 0 20 Thousand/uL    Lymphocytes Absolute 1 97 0 60 - 4 47 Thousands/µL    Monocytes Absolute 0 84 0 17 - 1 22 Thousand/µL    Eosinophils Absolute 0 10 0 00 - 0 61 Thousand/µL    Basophils Absolute 0 06 0 00 - 0 10 Thousands/µL   Tissue Exam    Collection Time: 12/14/22  9:24 AM   Result Value Ref Range    Case Report       Surgical Pathology Report                         Case: [id number]                                   Authorizing Provider:  Sharona Sanon MD    Collected:           12/14/2022 [id number]              Ordering Location:     [address] [place] [address]      Received:            12/14/2022 [address] [facility]                                                      Pathologist:           Jerry Mcnulty DO                                                            Specimen:    Testis, Left, LEFT TESTICLE AND SPERMATIC CORD                                             Final Diagnosis       A  Left testicle and spermatic cord, radical orchiectomy:  -Seminoma, 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   -Largest viable tumor focus measures 0 9 cm in greatest dimension    -All margins negative for carcinoma   -Definitive lymph-vascular invasion not identified, see note  -Extensive necrosis and fibrosis present  -SEE SYNOPTIC REPORT    Comment: Tumor cells are positive for OCT3/4 and  and negative for CD45, CK-AE1/AE3, CD30, glypican-3, AFP, Beta-HCG, and inhibin      Note       Small focus of tumor is present within lymphvascular space without surrounding fibrin or clot  This is favored to represent carryover      Intradepartmental consultation (AT) is in agreement  Dr Sharona Sanon was notified by   Melodee Holter via [employer] at 15:03 on 12/21/2022  Additional Information       All reported additional testing was performed with appropriately reactive controls  These tests were developed and their performance characteristics determined by [facility] or appropriate performing facility, though some tests may be performed on tissues which have not been validated for performance characteristics (such as staining performed on alcohol exposed cell blocks and decalcified tissues)  Results should be interpreted with caution and in the context of the patients’ clinical condition  These tests may not be cleared or approved by the [place]  Food and Drug Administration, though the FDA has determined that such clearance or approval is not necessary  These tests are used for clinical purposes and they should not be regarded as investigational or for research  This laboratory has been approved by [place] [id number], designated as a high-complexity laboratory and is qualified to perform these tests  Interpretation performed at [place] [address]      Synoptic Checklist       TESTIS: Radical Orchiectomy   TESTIS: RADICAL ORCHIECTOMY - All Specimens   8th Edition - Protocol posted: 11/10/2021         CLINICAL      Pre-Orchiectomy Serum Tumor Markers:    Serum marker studies within normal limits       Post-Orchiectomy Serum Tumor Markers:    Unknown       Serum Tumor Markers (S):    S0 (serum marker study levels within normal limits)       SPECIMEN      Specimen Laterality:    Left       TUMOR      Tumor Focality:    Unifocal       Tumor Size:    Greatest dimension of main tumor mass (Centimeters): 4 5 cm      Histologic Type:    Seminoma       Tumor Extent:    Limited to testis       Lymphovascular Invasion:    Not identified       MARGINS      Margin Status:     All margins negative for tumor       REGIONAL LYMPH NODES      Regional Lymph Node Status:    Not applicable "(no regional lymph nodes submitted or found)       PATHOLOGIC STAGE CLASSIFICATION (pTNM, AJCC 8th Edition)      Reporting of pT, pN, and (when applicable) pM categories is based on information available to the pathologist at the time the report is issued  As per the AJCC (Chapter 1, 8th Ed ) it is the managing physician’s responsibility to establish the final pathologic stage based upon all pertinent information, including but potentially not limited to this pathology report  pT Category:    pT1b       pN Category:    pN not assigned (no nodes submitted or found)       Gross Description       A  The specimen is received in formalin, labeled with the patient's name and hospital number, and is designated \"left testicle and spermatic cord  \" It consists of a 99 0 g (after fixation) left testicle to include: epididymis and spermatic cord  The spermatic cord is tan, contains attached lobules of adipose tissue, is focally shaggy and measures 5 7 cm in length by 2 1 cm in average diameter  Sectioning the spermatic cord reveals grossly unremarkable cut surfaces  No lymph node candidates are identified in the attached soft tissue  The testicle measures 5 2 x 4 2 x 3 5 cm  The tunica vaginalis is tan-purple, rubbery, stretched, easily movable, and contains a small amount of attached soft tissue  No lymph node candidates are grossly identified within the attached soft tissue  The testicle is bivalved to reveal fluid trapped between the tunica albuginea and the tunica vaginalis  The tunica albuginea is tan-white, intact, and glistening  Occupying the entire testicle is a 4 5 x 3 8 x 1 5 cm tan-yellow, soft, and multi-nodular mass  The mass abuts the tunica albuginea, is 0 4 cm from the closest tunica vaginalis, 0 3 cm from the epididymis, and more than 3 0 cm from the spermatic cord resection margin  Sectioning the mass reveals tan-yellow and soft cut surfaces  The mass appears to be grossly confined to the epididymis   " No uninvolved testicular parenchyma is grossly identified  A C-shaped and distorted epididymis is present and measures 5 5 x 1 3 cm  Sectioning the epidiymis reveals tan and lobulated cut surfaces  Digital images are taken  Representative sections to include more than one section per cm of mass are submitted as follows:    A1: Spermatic cord resection margin, en face (away from the testicle)  A2: Midportion of the spermatic cord  A3: Distal portion of the spermatic cord (adjacent to the testicle)  A4: Full-thickness section of mass to include the closest tunica albuginea and the closest tunica vaginalis  A5-A6: Full-thickness sections of mass to include the tunicae  A7-A8: Mass to demonstrate its relationship to the closest epididymis  A9-A10: Additional full-thickness sections of mass  A11: Thickened tunica vaginalis    Note: The estimated total formalin fixation time based upon information provided by the submitting clinician and the standard processing schedule is under 72 hours  HPolster      Clinical Information AFP, HCG, LDH WNL    Basic metabolic panel    Collection Time: 12/14/22  1:14 PM   Result Value Ref Range    Sodium 128 (L) 135 - 147 mmol/L    Potassium 3 9 3 5 - 5 3 mmol/L    Chloride 95 (L) 96 - 108 mmol/L    CO2 24 21 - 32 mmol/L    ANION GAP 9 4 - 13 mmol/L    BUN 15 5 - 25 mg/dL    Creatinine 1 07 0 60 - 1 30 mg/dL    Glucose 154 (H) 65 - 140 mg/dL    Calcium 9 3 8 3 - 10 1 mg/dL    eGFR 89 ml/min/1 73sq m   Tissue Exam    Collection Time: 01/20/23 10:50 AM   Result Value Ref Range    Case Report       Surgical Pathology Report                         Case: [id number]                                   Authorizing Provider:   Shelly Way MD          Collected:           01/20/2023 1050              Ordering Location:     [address] [place] [address]      Received:            01/20/2023 [address] [facility] [address] "        Pathologist:           Jean Lazo MD                                                                 Specimen:    Lymph Node, retroperitoneal                                                                Final Diagnosis       A  Lymph Node, retroperitoneal:  - Fibrovascular and lymphoid tissue with extensive necrosis, most consistent with tumor necrosis  - No viable tumor cells present  Comment: Patient medical history of seminoma is noted  Immunohistochemistry for OCT3/4, , D2-40, AE1/3 are performed on tissue block A4 to help in the assessment of this case and they are non-contributory  CD68 highlights histiocytes  If clinical indicated, re-biopsy is recommended  Note       Interpretation performed at [facility], [address] [place] [address], [facility]        Additional Information       All reported additional testing was performed with appropriately reactive controls  These tests were developed and their performance characteristics determined by [facility] or appropriate performing facility, though some tests may be performed on tissues which have not been validated for performance characteristics (such as staining performed on alcohol exposed cell blocks and decalcified tissues)  Results should be interpreted with caution and in the context of the patients’ clinical condition  These tests may not be cleared or approved by the [place]  Food and Drug Administration, though the FDA has determined that such clearance or approval is not necessary  These tests are used for clinical purposes and they should not be regarded as investigational or for research  This laboratory has been approved by [place], designated as a high-complexity laboratory and is qualified to perform these tests  Diane Li Description       A  The specimen is received in formalin, labeled with the patient's name and hospital number, and is designated \" retroperitoneal lymph node\"   " The specimen consists of 4 tan friable tissue fragments measuring 0 2-0 3 cm in greatest dimension  The specimen is entirely submitted between sponges, 4 cassettes  Note: due to the size and consistency of specimen, the tissue may not survive histologic processing  Note: The estimated total formalin fixation time based upon information provided by the submitting clinician and the standard processing schedule is under 72 hours    Lili Villalpando      Clinical Information left testicular mass, suspected seminoma    Leukemia/Lymphoma flow cytometry    Collection Time: 01/20/23 10:57 AM   Result Value Ref Range    Scan Result SEE WRITTEN REPORT    HCG, tumor marker    Collection Time: 02/16/23 11:43 AM   Result Value Ref Range    hCG Tumor Marker <2 <5 mlU/mL   AFP tumor marker    Collection Time: 02/16/23 11:43 AM   Result Value Ref Range    AFP TUMOR MARKER 1 6 0 5 - 8 ng/mL   LD,Blood    Collection Time: 02/16/23 11:43 AM   Result Value Ref Range     (H) 81 - 234 U/L   CBC and differential    Collection Time: 02/16/23 11:43 AM   Result Value Ref Range    WBC 12 34 (H) 4 31 - 10 16 Thousand/uL    RBC 4 26 3 88 - 5 62 Million/uL    Hemoglobin 14 2 12 0 - 17 0 g/dL    Hematocrit 42 0 36 5 - 49 3 %    MCV 99 (H) 82 - 98 fL    MCH 33 3 26 8 - 34 3 pg    MCHC 33 8 31 4 - 37 4 g/dL    RDW 13 5 11 6 - 15 1 %    MPV 9 8 8 9 - 12 7 fL    Platelets 755 703 - 950 Thousands/uL    nRBC 0 /100 WBCs    Neutrophils Relative 71 43 - 75 %    Immat GRANS % 1 0 - 2 %    Lymphocytes Relative 19 14 - 44 %    Monocytes Relative 7 4 - 12 %    Eosinophils Relative 1 0 - 6 %    Basophils Relative 1 0 - 1 %    Neutrophils Absolute 8 77 (H) 1 85 - 7 62 Thousands/µL    Immature Grans Absolute 0 14 0 00 - 0 20 Thousand/uL    Lymphocytes Absolute 2 36 0 60 - 4 47 Thousands/µL    Monocytes Absolute 0 85 0 17 - 1 22 Thousand/µL    Eosinophils Absolute 0 12 0 00 - 0 61 Thousand/µL    Basophils Absolute 0 10 0 00 - 0 10 Thousands/µL Comprehensive metabolic panel    Collection Time: 02/16/23 11:43 AM   Result Value Ref Range    Sodium 136 135 - 147 mmol/L    Potassium 3 5 3 5 - 5 3 mmol/L    Chloride 103 96 - 108 mmol/L    CO2 26 21 - 32 mmol/L    ANION GAP 7 4 - 13 mmol/L    BUN 13 5 - 25 mg/dL    Creatinine 0 85 0 60 - 1 30 mg/dL    Glucose 106 65 - 140 mg/dL    Calcium 9 5 8 3 - 10 1 mg/dL    AST 24 5 - 45 U/L    ALT 54 12 - 78 U/L    Alkaline Phosphatase 114 46 - 116 U/L    Total Protein 7 2 6 4 - 8 4 g/dL    Albumin 3 8 3 5 - 5 0 g/dL    Total Bilirubin 0 42 0 20 - 1 00 mg/dL    eGFR 112 ml/min/1 73sq m   CBC and differential    Collection Time: 03/19/23 11:52 AM   Result Value Ref Range    WBC 11 36 (H) 4 31 - 10 16 Thousand/uL    RBC 4 70 3 88 - 5 62 Million/uL    Hemoglobin 15 3 12 0 - 17 0 g/dL    Hematocrit 46 0 36 5 - 49 3 %    MCV 98 82 - 98 fL    MCH 32 6 26 8 - 34 3 pg    MCHC 33 3 31 4 - 37 4 g/dL    RDW 13 2 11 6 - 15 1 %    MPV 8 9 8 9 - 12 7 fL    Platelets 381 917 - 050 Thousands/uL    nRBC 0 /100 WBCs    Neutrophils Relative 73 43 - 75 %    Immat GRANS % 1 0 - 2 %    Lymphocytes Relative 17 14 - 44 %    Monocytes Relative 7 4 - 12 %    Eosinophils Relative 1 0 - 6 %    Basophils Relative 1 0 - 1 %    Neutrophils Absolute 8 38 (H) 1 85 - 7 62 Thousands/µL    Immature Grans Absolute 0 08 0 00 - 0 20 Thousand/uL    Lymphocytes Absolute 1 97 0 60 - 4 47 Thousands/µL    Monocytes Absolute 0 74 0 17 - 1 22 Thousand/µL    Eosinophils Absolute 0 13 0 00 - 0 61 Thousand/µL    Basophils Absolute 0 06 0 00 - 0 10 Thousands/µL   Comprehensive metabolic panel    Collection Time: 03/19/23 11:52 AM   Result Value Ref Range    Sodium 136 135 - 147 mmol/L    Potassium 3 5 3 5 - 5 3 mmol/L    Chloride 97 96 - 108 mmol/L    CO2 31 21 - 32 mmol/L    ANION GAP 8 4 - 13 mmol/L    BUN 13 5 - 25 mg/dL    Creatinine 1 05 0 60 - 1 30 mg/dL    Glucose 106 65 - 140 mg/dL    Calcium 9 7 8 4 - 10 2 mg/dL    AST 21 13 - 39 U/L    ALT 42 7 - 52 U/L Alkaline Phosphatase 127 (H) 34 - 104 U/L    Total Protein 7 7 6 4 - 8 4 g/dL    Albumin 4 4 3 5 - 5 0 g/dL    Total Bilirubin 0 71 0 20 - 1 00 mg/dL    eGFR 91 ml/min/1 73sq m   Magnesium    Collection Time: 03/19/23 11:52 AM   Result Value Ref Range    Magnesium 2 1 1 9 - 2 7 mg/dL   CBC and differential    Collection Time: 04/02/23  1:22 PM   Result Value Ref Range    WBC 4 14 (L) 4 31 - 10 16 Thousand/uL    RBC 3 79 (L) 3 88 - 5 62 Million/uL    Hemoglobin 12 6 12 0 - 17 0 g/dL    Hematocrit 34 6 (L) 36 5 - 49 3 %    MCV 91 82 - 98 fL    MCH 33 2 26 8 - 34 3 pg    MCHC 36 4 31 4 - 37 4 g/dL    RDW 12 6 11 6 - 15 1 %    MPV 10 2 8 9 - 12 7 fL    Platelets 55 (L) 002 - 390 Thousands/uL   Basic metabolic panel    Collection Time: 04/02/23  1:22 PM   Result Value Ref Range    Sodium 130 (L) 135 - 147 mmol/L    Potassium 2 9 (L) 3 5 - 5 3 mmol/L    Chloride 91 (L) 96 - 108 mmol/L    CO2 30 21 - 32 mmol/L    ANION GAP 9 4 - 13 mmol/L    BUN 19 5 - 25 mg/dL    Creatinine 0 86 0 60 - 1 30 mg/dL    Glucose 92 65 - 140 mg/dL    Calcium 9 0 8 4 - 10 2 mg/dL    eGFR 112 ml/min/1 73sq m   Hepatic function panel    Collection Time: 04/02/23  1:22 PM   Result Value Ref Range    Total Bilirubin 0 44 0 20 - 1 00 mg/dL    Bilirubin, Direct 0 17 0 00 - 0 20 mg/dL    Alkaline Phosphatase 130 (H) 34 - 104 U/L    AST 25 13 - 39 U/L    ALT 71 (H) 7 - 52 U/L    Total Protein 6 5 6 4 - 8 4 g/dL    Albumin 3 7 3 5 - 5 0 g/dL   Magnesium    Collection Time: 04/02/23  1:22 PM   Result Value Ref Range    Magnesium 1 6 (L) 1 9 - 2 7 mg/dL   Phosphorus    Collection Time: 04/02/23  1:22 PM   Result Value Ref Range    Phosphorus 2 5 (L) 2 7 - 4 5 mg/dL   CK    Collection Time: 04/02/23  1:22 PM   Result Value Ref Range    Total CK 42 39 - 308 U/L   Manual Differential(PHLEBS Do Not Order)    Collection Time: 04/02/23  1:22 PM   Result Value Ref Range    Segmented % 15 (L) 43 - 75 %    Bands % 24 (H) 0 - 8 %    Lymphocytes % 38 14 - 44 % Monocytes % 14 (H) 4 - 12 %    Eosinophils, % 0 0 - 6 %    Basophils % 2 (H) 0 - 1 %    Metamyelocytes% 2 (H) 0 - 1 %    Myelocytes % 1 0 - 1 %    Atypical Lymphocytes % 4 (H) <=0 %    Absolute Neutrophils 1 61 (L) 1 85 - 7 62 Thousand/uL    Lymphocytes Absolute 1 57 0 60 - 4 47 Thousand/uL    Monocytes Absolute 0 58 0 00 - 1 22 Thousand/uL    Eosinophils Absolute 0 00 0 00 - 0 40 Thousand/uL    Basophils Absolute 0 08 0 00 - 0 10 Thousand/uL    Total Counted      Dohle Bodies Present     RBC Morphology Normal     Platelet Estimate Decreased (A) Adequate   CBC and differential    Collection Time: 04/10/23 10:30 AM   Result Value Ref Range    WBC 13 88 (H) 4 31 - 10 16 Thousand/uL    RBC 3 64 (L) 3 88 - 5 62 Million/uL    Hemoglobin 12 1 12 0 - 17 0 g/dL    Hematocrit 34 6 (L) 36 5 - 49 3 %    MCV 95 82 - 98 fL    MCH 33 2 26 8 - 34 3 pg    MCHC 35 0 31 4 - 37 4 g/dL    RDW 13 0 11 6 - 15 1 %    MPV 9 4 8 9 - 12 7 fL    Platelets 742 646 - 245 Thousands/uL   Comprehensive metabolic panel    Collection Time: 04/10/23 10:30 AM   Result Value Ref Range    Sodium 136 135 - 147 mmol/L    Potassium 3 7 3 5 - 5 3 mmol/L    Chloride 99 96 - 108 mmol/L    CO2 29 21 - 32 mmol/L    ANION GAP 8 4 - 13 mmol/L    BUN 13 5 - 25 mg/dL    Creatinine 0 87 0 60 - 1 30 mg/dL    Glucose 84 65 - 140 mg/dL    Calcium 9 2 8 4 - 10 2 mg/dL    AST 29 13 - 39 U/L    ALT 84 (H) 7 - 52 U/L    Alkaline Phosphatase 127 (H) 34 - 104 U/L    Total Protein 6 7 6 4 - 8 4 g/dL    Albumin 3 9 3 5 - 5 0 g/dL    Total Bilirubin 0 40 0 20 - 1 00 mg/dL    eGFR 111 ml/min/1 73sq m   Magnesium    Collection Time: 04/10/23 10:30 AM   Result Value Ref Range    Magnesium 1 8 (L) 1 9 - 2 7 mg/dL   Manual Differential(PHLEBS Do Not Order)    Collection Time: 04/10/23 10:30 AM   Result Value Ref Range    Segmented % 71 43 - 75 %    Bands % 2 0 - 8 %    Lymphocytes % 13 (L) 14 - 44 %    Monocytes % 6 4 - 12 %    Eosinophils, % 2 0 - 6 %    Basophils % 0 0 - 1 % Myelocytes % 6 (H) 0 - 1 %    Absolute Neutrophils 10 13 (H) 1 85 - 7 62 Thousand/uL    Lymphocytes Absolute 1 80 0 60 - 4 47 Thousand/uL    Monocytes Absolute 0 83 0 00 - 1 22 Thousand/uL    Eosinophils Absolute 0 28 0 00 - 0 40 Thousand/uL    Basophils Absolute 0 00 0 00 - 0 10 Thousand/uL    Total Counted      RBC Morphology Normal     Platelet Estimate Adequate Adequate   CBC and differential    Collection Time: 04/30/23  9:12 AM   Result Value Ref Range    WBC 14 40 (H) 4 31 - 10 16 Thousand/uL    RBC 3 66 (L) 3 88 - 5 62 Million/uL    Hemoglobin 11 9 (L) 12 0 - 17 0 g/dL    Hematocrit 35 6 (L) 36 5 - 49 3 %    MCV 97 82 - 98 fL    MCH 32 5 26 8 - 34 3 pg    MCHC 33 4 31 4 - 37 4 g/dL    RDW 15 3 (H) 11 6 - 15 1 %    MPV 9 3 8 9 - 12 7 fL    Platelets 075 191 - 366 Thousands/uL   Comprehensive metabolic panel    Collection Time: 04/30/23  9:12 AM   Result Value Ref Range    Sodium 134 (L) 135 - 147 mmol/L    Potassium 3 6 3 5 - 5 3 mmol/L    Chloride 100 96 - 108 mmol/L    CO2 28 21 - 32 mmol/L    ANION GAP 6 4 - 13 mmol/L    BUN 10 5 - 25 mg/dL    Creatinine 0 85 0 60 - 1 30 mg/dL    Glucose, Fasting 90 65 - 99 mg/dL    Calcium 9 1 8 4 - 10 2 mg/dL    AST 25 13 - 39 U/L    ALT 66 (H) 7 - 52 U/L    Alkaline Phosphatase 126 (H) 34 - 104 U/L    Total Protein 7 0 6 4 - 8 4 g/dL    Albumin 3 8 3 5 - 5 0 g/dL    Total Bilirubin 0 54 0 20 - 1 00 mg/dL    eGFR 112 ml/min/1 73sq m   Magnesium    Collection Time: 04/30/23  9:12 AM   Result Value Ref Range    Magnesium 1 9 1 9 - 2 7 mg/dL   Manual Differential(PHLEBS Do Not Order)    Collection Time: 04/30/23  9:12 AM   Result Value Ref Range    Segmented % 64 43 - 75 %    Bands % 2 0 - 8 %    Lymphocytes % 22 14 - 44 %    Monocytes % 6 4 - 12 %    Eosinophils, % 0 0 - 6 %    Basophils % 0 0 - 1 %    Metamyelocytes% 1 0 - 1 %    Myelocytes % 4 (H) 0 - 1 %    Atypical Lymphocytes % 1 (H) <=0 %    Absolute Neutrophils 9 50 (H) 1 85 - 7 62 Thousand/uL    Lymphocytes Absolute 3 17 0 60 - 4 47 Thousand/uL    Monocytes Absolute 0 86 0 00 - 1 22 Thousand/uL    Eosinophils Absolute 0 00 0 00 - 0 40 Thousand/uL    Basophils Absolute 0 00 0 00 - 0 10 Thousand/uL    Total Counted      RBC Morphology Present     Anisocytosis Present     Platelet Estimate Adequate Adequate    Large Platelet Present    CBC and differential    Collection Time: 05/04/23  9:06 AM   Result Value Ref Range    WBC 8 73 4 31 - 10 16 Thousand/uL    RBC 3 24 (L) 3 88 - 5 62 Million/uL    Hemoglobin 10 8 (L) 12 0 - 17 0 g/dL    Hematocrit 31 4 (L) 36 5 - 49 3 %    MCV 97 82 - 98 fL    MCH 33 3 26 8 - 34 3 pg    MCHC 34 4 31 4 - 37 4 g/dL    RDW 15 6 (H) 11 6 - 15 1 %    MPV 8 9 8 9 - 12 7 fL    Platelets 709 118 - 004 Thousands/uL    nRBC 0 /100 WBCs    Neutrophils Relative 80 (H) 43 - 75 %    Immat GRANS % 1 0 - 2 %    Lymphocytes Relative 16 14 - 44 %    Monocytes Relative 3 (L) 4 - 12 %    Eosinophils Relative 0 0 - 6 %    Basophils Relative 0 0 - 1 %    Neutrophils Absolute 6 99 1 85 - 7 62 Thousands/µL    Immature Grans Absolute 0 04 0 00 - 0 20 Thousand/uL    Lymphocytes Absolute 1 41 0 60 - 4 47 Thousands/µL    Monocytes Absolute 0 25 0 17 - 1 22 Thousand/µL    Eosinophils Absolute 0 03 0 00 - 0 61 Thousand/µL    Basophils Absolute 0 01 0 00 - 0 10 Thousands/µL   Comprehensive metabolic panel    Collection Time: 05/04/23  9:06 AM   Result Value Ref Range    Sodium 137 135 - 147 mmol/L    Potassium 3 9 3 5 - 5 3 mmol/L    Chloride 101 96 - 108 mmol/L    CO2 27 21 - 32 mmol/L    ANION GAP 9 4 - 13 mmol/L    BUN 14 5 - 25 mg/dL    Creatinine 0 71 0 60 - 1 30 mg/dL    Glucose 82 65 - 140 mg/dL    Calcium 9 0 8 4 - 10 2 mg/dL    AST 23 13 - 39 U/L    ALT 69 (H) 7 - 52 U/L    Alkaline Phosphatase 102 34 - 104 U/L    Total Protein 6 6 6 4 - 8 4 g/dL    Albumin 3 8 3 5 - 5 0 g/dL    Total Bilirubin 0 71 0 20 - 1 00 mg/dL    eGFR 120 ml/min/1 73sq m   Magnesium    Collection Time: 05/04/23  9:06 AM   Result Value Ref Range    Magnesium 2 2 1 9 - 2 7 mg/dL   B-Type Natriuretic Peptide(BNP)    Collection Time: 05/04/23  9:06 AM   Result Value Ref Range     (H) 0 - 100 pg/mL   ECG 12 lead    Collection Time: 05/04/23  9:06 AM   Result Value Ref Range    Ventricular Rate 61 BPM    Atrial Rate 61 BPM    NJ Interval 166 ms    QRSD Interval 86 ms    QT Interval 400 ms    QTC Interval 402 ms    P Axis 41 degrees    QRS Axis 33 degrees    T Wave Axis 43 degrees   UA w Reflex to Microscopic w Reflex to Culture    Collection Time: 05/04/23  9:10 AM    Specimen: Urine, Other   Result Value Ref Range    Color, UA Debbie (A) Straw, Yellow, Pale Yellow    Clarity, UA Clear Clear, Other    Specific Gravity, UA 1 015 1 003 - 1 040    pH, UA 7 0 4 5, 5 0, 5 5, 6 0, 6 5, 7 0, 7 5, 8 0    Leukocytes, UA Negative Negative    Nitrite, UA Negative Negative    Protein, UA Negative Negative mg/dl    Glucose, UA Negative Negative mg/dl    Ketones, UA Negative Negative mg/dl    Bilirubin, UA Negative Negative    Occult Blood, UA Negative Negative    UROBILINOGEN UA 1 0 1 0, Negative mg/dL       Laboratory Results: I have personally reviewed the pertinent laboratory results/reports     Radiology/Other Diagnostic Testing Results: I have personally reviewed pertinent reports  CT abdomen pelvis wo contrast    Result Date: 5/15/2023  CT ABDOMEN AND PELVIS WITHOUT IV CONTRAST INDICATION:   C62 92: Malignant neoplasm of left testis, unspecified whether descended or undescended  History of seminoma, status post orchiectomy  The patient had enlarged lymph nodes and underwent a lymph node biopsy several months ago which ultimately only showed necrosis  Surveillance  COMPARISON: CT, dated 2/15/2023  CT, dated 12/10/2022  CT, dated 9/13/2020  TECHNIQUE:  CT examination of the abdomen and pelvis was performed without intravenous contrast  Multiplanar 2D reformatted images were created from the source data   This examination, like all CT scans performed in the [facility], was performed utilizing techniques to minimize radiation dose exposure, including the use of iterative reconstruction and automated exposure control  Radiation dose length product (DLP) for this visit:  1391 7 mGy-cm Enteric contrast was administered  FINDINGS: ABDOMEN LOWER CHEST: Bibasilar atelectasis  LIVER/BILIARY TREE: Liver is diffusely decreased in density consistent with fatty change  No CT evidence of suspicious hepatic mass  Normal hepatic contours  No biliary dilatation  GALLBLADDER:  No calcified gallstones  No pericholecystic inflammatory change  SPLEEN:  Unremarkable  PANCREAS:  Unremarkable  ADRENAL GLANDS:  Unremarkable  KIDNEYS/URETERS:  Unremarkable  No hydronephrosis  STOMACH AND BOWEL:  Unremarkable  APPENDIX:  No findings to suggest appendicitis  ABDOMINOPELVIC CAVITY: 2 para-aortic lymph nodes remain minimally enlarged, measuring 12 mm respectively (series 201, image 101 and 116)  This is unchanged from the study in February and they remain decreased in caliber from the study in December  No new enlarged lymph nodes  VESSELS:  Unremarkable for patient's age  PELVIS REPRODUCTIVE ORGANS:  Unremarkable for patient's age  URINARY BLADDER:  Unremarkable  ABDOMINAL WALL/INGUINAL REGIONS: Fat-containing umbilical hernia  OSSEOUS STRUCTURES:  No acute fracture or destructive osseous lesion  2 para-aortic lymph nodes remain minimally increased in size, unchanged  One of these was biopsied and returned back as necrosis without viable tumor in January  No new enlarged lymph nodes  No other findings to suggest metastatic disease   Workstation performed: [id number]        Assessment/Plan:  Problem List Items Addressed This Visit        Nervous and Auditory    Unilateral vestibular schwannoma (HCC)       Genitourinary    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[address] )       Other    Umbilical hernia without obstruction and without gangrene    Chemotherapy induced neutropenia ([address] ) "Cerebral gliosis   Other Visit Diagnoses     Acute systolic congestive heart failure (HCC)    -  Primary    Relevant Medications    torsemide (DEMADEX) 20 mg tablet    potassium chloride (K-DUR,KLOR-CON) 10 mEq tablet    Other Relevant Orders    B-Type Natriuretic Peptide(BNP)    Echo complete w/ contrast if indicated    Bipolar disorder with depression (HCC)        Relevant Medications    QUEtiapine (SEROquel) 25 mg tablet    FLUoxetine (PROzac) 10 mg capsule    Testicular mass          reviewed labs, imaging- stable abdominal LN, no widespread metastatic disease, does have fatty liver  Likely in acute CHF due to chemo, fluid overload, reviewed labs on file, he has next labs to be drawn next week and add BNP to it, Check echo  Advised to resume seroquel and fluoxetine for Bipolar 1 disorder and take torsemide 20 mg and kdur 10 meq daily for next 3 weeks and follow up thereafter  Read package inserts for all medications before starting a new medications, call me if you have any questions  Patient was given opportunity to ask questions and all questions were answered  Disclaimer: Portions of the record may have been created with voice recognition software  Occasional wrong word or \"sound a like\" substitutions may have occurred due to the inherent limitations of voice recognition software  Read the chart carefully and recognize, using context, where substitutions have occurred  I have used the Epic copy/forward function to compose this note  I have reviewed my current note to ensure it reflects the current patient status, exam, assessment and plan      "

## 2023-05-22 ENCOUNTER — HOSPITAL ENCOUNTER (OUTPATIENT)
Dept: INFUSION CENTER | Facility: CLINIC | Age: 38
Discharge: HOME | End: 2023-05-22

## 2023-05-22 VITALS
BODY MASS INDEX: 37.45 KG/M2 | OXYGEN SATURATION: 98 % | RESPIRATION RATE: 18 BRPM | TEMPERATURE: 97.9 F | WEIGHT: 307.5 LBS | DIASTOLIC BLOOD PRESSURE: 87 MMHG | SYSTOLIC BLOOD PRESSURE: 129 MMHG | HEIGHT: 76 IN | HEART RATE: 76 BPM

## 2023-05-22 DIAGNOSIS — D70.1 CHEMOTHERAPY INDUCED NEUTROPENIA (HCC): Primary | ICD-10-CM

## 2023-05-22 DIAGNOSIS — T45.1X5A CHEMOTHERAPY INDUCED NEUTROPENIA (HCC): Primary | ICD-10-CM

## 2023-05-22 DIAGNOSIS — C62.92 SEMINOMA OF LEFT TESTIS (HCC): ICD-10-CM

## 2023-05-22 LAB
ALBUMIN SERPL BCP-MCNC: 4.1 G/DL (ref 3.5–5)
ALP SERPL-CCNC: 128 U/L (ref 34–104)
ALT SERPL W P-5'-P-CCNC: 68 U/L (ref 7–52)
ANION GAP SERPL CALCULATED.3IONS-SCNC: 5 MMOL/L (ref 4–13)
AST SERPL W P-5'-P-CCNC: 26 U/L (ref 13–39)
BASOPHILS # BLD AUTO: 0.06 THOUSANDS/ÂΜL (ref 0–0.1)
BASOPHILS NFR BLD AUTO: 1 % (ref 0–1)
BILIRUB SERPL-MCNC: 0.49 MG/DL (ref 0.2–1)
BUN SERPL-MCNC: 16 MG/DL (ref 5–25)
CALCIUM SERPL-MCNC: 9.4 MG/DL (ref 8.4–10.2)
CHLORIDE SERPL-SCNC: 97 MMOL/L (ref 96–108)
CO2 SERPL-SCNC: 35 MMOL/L (ref 21–32)
CREAT SERPL-MCNC: 0.84 MG/DL (ref 0.6–1.3)
EOSINOPHIL # BLD AUTO: 0.05 THOUSAND/ÂΜL (ref 0–0.61)
EOSINOPHIL NFR BLD AUTO: 0 % (ref 0–6)
ERYTHROCYTE [DISTWIDTH] IN BLOOD BY AUTOMATED COUNT: 17.3 % (ref 11.6–15.1)
GFR SERPL CREATININE-BSD FRML MDRD: 112 ML/MIN/1.73SQ M
GLUCOSE SERPL-MCNC: 84 MG/DL (ref 65–140)
HCT VFR BLD AUTO: 36.8 % (ref 36.5–49.3)
HGB BLD-MCNC: 12.2 G/DL (ref 12–17)
IMM GRANULOCYTES # BLD AUTO: 0.19 THOUSAND/UL (ref 0–0.2)
IMM GRANULOCYTES NFR BLD AUTO: 2 % (ref 0–2)
LYMPHOCYTES # BLD AUTO: 1.92 THOUSANDS/ÂΜL (ref 0.6–4.47)
LYMPHOCYTES NFR BLD AUTO: 17 % (ref 14–44)
MAGNESIUM SERPL-MCNC: 2.2 MG/DL (ref 1.9–2.7)
MCH RBC QN AUTO: 32.9 PG (ref 26.8–34.3)
MCHC RBC AUTO-ENTMCNC: 33.2 G/DL (ref 31.4–37.4)
MCV RBC AUTO: 99 FL (ref 82–98)
MONOCYTES # BLD AUTO: 1.08 THOUSAND/ÂΜL (ref 0.17–1.22)
MONOCYTES NFR BLD AUTO: 9 % (ref 4–12)
NEUTROPHILS # BLD AUTO: 8.24 THOUSANDS/ÂΜL (ref 1.85–7.62)
NEUTS SEG NFR BLD AUTO: 71 % (ref 43–75)
NRBC BLD AUTO-RTO: 0 /100 WBCS
PLATELET # BLD AUTO: 276 THOUSANDS/UL (ref 149–390)
PMV BLD AUTO: 9.3 FL (ref 8.9–12.7)
POTASSIUM SERPL-SCNC: 3.4 MMOL/L (ref 3.5–5.3)
PROT SERPL-MCNC: 7.1 G/DL (ref 6.4–8.4)
RBC # BLD AUTO: 3.71 MILLION/UL (ref 3.88–5.62)
SODIUM SERPL-SCNC: 137 MMOL/L (ref 135–147)
WBC # BLD AUTO: 11.54 THOUSAND/UL (ref 4.31–10.16)

## 2023-05-22 RX ORDER — SODIUM CHLORIDE 9 MG/ML
20 INJECTION, SOLUTION INTRAVENOUS ONCE
Status: COMPLETED | OUTPATIENT
Start: 2023-05-22 | End: 2023-05-22

## 2023-05-22 RX ADMIN — DEXAMETHASONE SODIUM PHOSPHATE: 10 INJECTION, SOLUTION INTRAMUSCULAR; INTRAVENOUS at 09:07

## 2023-05-22 RX ADMIN — ETOPOSIDE 270 MG: 20 INJECTION, SOLUTION INTRAVENOUS at 10:14

## 2023-05-22 RX ADMIN — APREPITANT 130 MG: 130 INJECTION, EMULSION INTRAVENOUS at 09:30

## 2023-05-22 RX ADMIN — SODIUM CHLORIDE 500 ML: 0.9 INJECTION, SOLUTION INTRAVENOUS at 11:18

## 2023-05-22 RX ADMIN — SODIUM CHLORIDE 20 ML/HR: 0.9 INJECTION, SOLUTION INTRAVENOUS at 08:40

## 2023-05-22 NOTE — PROGRESS NOTES
Pt here for Etoposide  Per note from 5/1/23, Cisplatin d/c'd for all upcoming treatments due to neuropathy  Vitals stable  Pt did not get bloodwork done; labs drawn with port access  Callbell within reach

## 2023-05-22 NOTE — PLAN OF CARE
Problem: Knowledge Deficit  Goal: Patient/family/caregiver demonstrates understanding of disease process, treatment plan, medications, and discharge instructions  Description: Complete learning assessment and assess knowledge base    Interventions:  - Provide teaching at level of understanding  - Provide teaching via preferred learning methods  5/22/2023 0831 by Salty Martinez RN  Outcome: Progressing  5/22/2023 0830 by Salty Martinez, RN  Outcome: Progressing

## 2023-05-23 ENCOUNTER — TELEPHONE (OUTPATIENT)
Dept: HEMATOLOGY ONCOLOGY | Facility: CLINIC | Age: 38
End: 2023-05-23

## 2023-05-23 ENCOUNTER — HOSPITAL ENCOUNTER (OUTPATIENT)
Dept: INFUSION CENTER | Facility: CLINIC | Age: 38
Discharge: HOME | End: 2023-05-23

## 2023-05-23 VITALS
HEIGHT: 76 IN | RESPIRATION RATE: 18 BRPM | WEIGHT: 314.5 LBS | DIASTOLIC BLOOD PRESSURE: 81 MMHG | SYSTOLIC BLOOD PRESSURE: 117 MMHG | BODY MASS INDEX: 38.3 KG/M2 | OXYGEN SATURATION: 94 % | TEMPERATURE: 97.8 F | HEART RATE: 80 BPM

## 2023-05-23 DIAGNOSIS — T45.1X5A CHEMOTHERAPY INDUCED NEUTROPENIA (HCC): Primary | ICD-10-CM

## 2023-05-23 DIAGNOSIS — C62.92 SEMINOMA OF LEFT TESTIS (HCC): ICD-10-CM

## 2023-05-23 DIAGNOSIS — D70.1 CHEMOTHERAPY INDUCED NEUTROPENIA (HCC): Primary | ICD-10-CM

## 2023-05-23 RX ORDER — MAGNESIUM SULFATE HEPTAHYDRATE 40 MG/ML
2 INJECTION, SOLUTION INTRAVENOUS ONCE
Status: COMPLETED | OUTPATIENT
Start: 2023-05-23 | End: 2023-05-23

## 2023-05-23 RX ORDER — SODIUM CHLORIDE 9 MG/ML
20 INJECTION, SOLUTION INTRAVENOUS ONCE
Status: COMPLETED | OUTPATIENT
Start: 2023-05-23 | End: 2023-05-23

## 2023-05-23 RX ADMIN — MAGNESIUM SULFATE HEPTAHYDRATE 2 G: 40 INJECTION, SOLUTION INTRAVENOUS at 10:35

## 2023-05-23 RX ADMIN — DEXAMETHASONE SODIUM PHOSPHATE: 10 INJECTION, SOLUTION INTRAMUSCULAR; INTRAVENOUS at 09:50

## 2023-05-23 RX ADMIN — SODIUM CHLORIDE 20 ML/HR: 9 INJECTION, SOLUTION INTRAVENOUS at 09:30

## 2023-05-23 RX ADMIN — SODIUM CHLORIDE 500 ML: 0.9 INJECTION, SOLUTION INTRAVENOUS at 10:35

## 2023-05-23 NOTE — PROGRESS NOTES
Patient complaining of dizziness / nausea: Urinated all over the bathroom: Normal saline fluids running at [facility] prior to episode: Dr Brandy Ruby made aware: Vital signs stable: Refusing ED evaluation: Fluid bolus / Magnesium to be hung prior to Etoposide:  Will continue to monitor and give patient updates to MD for further treatment orders

## 2023-05-23 NOTE — TELEPHONE ENCOUNTER
Patient reached out through [address] and asked if his infusion treatments can be changed to [place]  Please reach out to patient to review  Thanks

## 2023-05-23 NOTE — PROGRESS NOTES
Tolerated IV Bolus / Magnesium: Still complaining of dizziness: Nausea subsided: Refusing Etoposide infusion / Refusing ED evaluation: Dr Gayathri Shore made aware: Awaiting further orders

## 2023-05-23 NOTE — PROGRESS NOTES
Dr Gayathri Shore aware of Etoposide / ED refusal: Patient may go home: If symptoms persist or get worse patient advised to been seen in the ED: Right PAC remains accessed per patient request: Verified follow up appt with patient: AVS offered and declined

## 2023-05-23 NOTE — PROGRESS NOTES
Patient to [facility] for Hydration / Magnesium / Etoposide: Offers no complaints at present time: Lab work ( 05/22/23 ) reviewed:  Within parameters to treat: Right PAC remains accessed per patient request: Good blood return noted: Flushes without difficulty

## 2023-05-24 ENCOUNTER — HOSPITAL ENCOUNTER (OUTPATIENT)
Dept: INFUSION CENTER | Facility: CLINIC | Age: 38
Discharge: HOME | End: 2023-05-24

## 2023-05-24 VITALS
TEMPERATURE: 97.5 F | SYSTOLIC BLOOD PRESSURE: 130 MMHG | HEART RATE: 83 BPM | OXYGEN SATURATION: 97 % | HEIGHT: 76 IN | RESPIRATION RATE: 18 BRPM | DIASTOLIC BLOOD PRESSURE: 87 MMHG | WEIGHT: 311.29 LBS | BODY MASS INDEX: 37.91 KG/M2

## 2023-05-24 DIAGNOSIS — T45.1X5A CHEMOTHERAPY INDUCED NEUTROPENIA (HCC): Primary | ICD-10-CM

## 2023-05-24 DIAGNOSIS — C62.92 SEMINOMA OF LEFT TESTIS (HCC): ICD-10-CM

## 2023-05-24 DIAGNOSIS — D70.1 CHEMOTHERAPY INDUCED NEUTROPENIA (HCC): Primary | ICD-10-CM

## 2023-05-24 RX ORDER — SODIUM CHLORIDE 9 MG/ML
20 INJECTION, SOLUTION INTRAVENOUS ONCE
Status: COMPLETED | OUTPATIENT
Start: 2023-05-24 | End: 2023-05-24

## 2023-05-24 RX ADMIN — SODIUM CHLORIDE 20 ML/HR: 0.9 INJECTION, SOLUTION INTRAVENOUS at 09:57

## 2023-05-24 RX ADMIN — ETOPOSIDE 270 MG: 20 INJECTION, SOLUTION INTRAVENOUS at 10:46

## 2023-05-24 RX ADMIN — DEXAMETHASONE SODIUM PHOSPHATE: 10 INJECTION, SOLUTION INTRAMUSCULAR; INTRAVENOUS at 09:58

## 2023-05-24 NOTE — PROGRESS NOTES
Pt tolerated treatment well  Pt urinated all over bathroom maharaj   Cathryn Mckeon RN aware of this  His behavior is very flat  Aware of next appt tomorrow  Port saline locked  AVS declined

## 2023-05-24 NOTE — PROGRESS NOTES
Pt resting with no complaints, pitting edema noted to bilateral ankles  Pt is refusing saline bolus, ANSELMO Flaherty RN is aware  Vitals stable, labs within parameters for treatment, call bell within reach  All questions answered and emotional support given  All needs met at this time

## 2023-05-25 ENCOUNTER — HOSPITAL ENCOUNTER (OUTPATIENT)
Dept: INFUSION CENTER | Facility: CLINIC | Age: 38
Discharge: HOME | End: 2023-05-25

## 2023-05-25 VITALS
HEART RATE: 86 BPM | BODY MASS INDEX: 37.57 KG/M2 | WEIGHT: 308.5 LBS | SYSTOLIC BLOOD PRESSURE: 119 MMHG | DIASTOLIC BLOOD PRESSURE: 82 MMHG | TEMPERATURE: 97.4 F | OXYGEN SATURATION: 96 % | RESPIRATION RATE: 18 BRPM | HEIGHT: 76 IN

## 2023-05-25 DIAGNOSIS — T45.1X5A CHEMOTHERAPY INDUCED NEUTROPENIA (HCC): Primary | ICD-10-CM

## 2023-05-25 DIAGNOSIS — C62.92 SEMINOMA OF LEFT TESTIS (HCC): ICD-10-CM

## 2023-05-25 DIAGNOSIS — D70.1 CHEMOTHERAPY INDUCED NEUTROPENIA (HCC): Primary | ICD-10-CM

## 2023-05-25 RX ORDER — SODIUM CHLORIDE 9 MG/ML
20 INJECTION, SOLUTION INTRAVENOUS ONCE
Status: COMPLETED | OUTPATIENT
Start: 2023-05-25 | End: 2023-05-25

## 2023-05-25 RX ADMIN — ETOPOSIDE 270 MG: 20 INJECTION, SOLUTION INTRAVENOUS at 10:30

## 2023-05-25 RX ADMIN — DEXAMETHASONE SODIUM PHOSPHATE: 10 INJECTION, SOLUTION INTRAMUSCULAR; INTRAVENOUS at 09:45

## 2023-05-25 RX ADMIN — SODIUM CHLORIDE 20 ML/HR: 0.9 INJECTION, SOLUTION INTRAVENOUS at 09:45

## 2023-05-25 NOTE — PROGRESS NOTES
Patient to infusion for Day 3 etoposide  He still refusing NS bolus  Jean Carlos Slider aware  Port remains accessed from yesterdays treatment, flushed, good blood return noted  Call bell within reach

## 2023-05-26 ENCOUNTER — HOSPITAL ENCOUNTER (OUTPATIENT)
Dept: INFUSION CENTER | Facility: CLINIC | Age: 38
End: 2023-05-26

## 2023-05-26 ENCOUNTER — OFFICE VISIT (OUTPATIENT)
Dept: HEMATOLOGY ONCOLOGY | Facility: CLINIC | Age: 38
End: 2023-05-26

## 2023-05-26 VITALS
HEIGHT: 76 IN | TEMPERATURE: 97.1 F | BODY MASS INDEX: 37.75 KG/M2 | OXYGEN SATURATION: 95 % | SYSTOLIC BLOOD PRESSURE: 120 MMHG | HEART RATE: 80 BPM | RESPIRATION RATE: 18 BRPM | DIASTOLIC BLOOD PRESSURE: 82 MMHG | WEIGHT: 309.97 LBS

## 2023-05-26 DIAGNOSIS — C62.92 SEMINOMA OF LEFT TESTIS (HCC): Primary | ICD-10-CM

## 2023-05-26 DIAGNOSIS — D70.1 CHEMOTHERAPY INDUCED NEUTROPENIA (HCC): Primary | ICD-10-CM

## 2023-05-26 DIAGNOSIS — T45.1X5A CHEMOTHERAPY INDUCED NEUTROPENIA (HCC): Primary | ICD-10-CM

## 2023-05-26 DIAGNOSIS — C62.92 SEMINOMA OF LEFT TESTIS (HCC): ICD-10-CM

## 2023-05-26 RX ORDER — SODIUM CHLORIDE 9 MG/ML
20 INJECTION, SOLUTION INTRAVENOUS ONCE
Status: COMPLETED | OUTPATIENT
Start: 2023-05-26 | End: 2023-05-26

## 2023-05-26 RX ADMIN — SODIUM CHLORIDE 20 ML/HR: 0.9 INJECTION, SOLUTION INTRAVENOUS at 08:03

## 2023-05-26 RX ADMIN — DEXAMETHASONE SODIUM PHOSPHATE: 10 INJECTION, SOLUTION INTRAMUSCULAR; INTRAVENOUS at 08:03

## 2023-05-26 RX ADMIN — ETOPOSIDE 270 MG: 20 INJECTION, SOLUTION INTRAVENOUS at 08:45

## 2023-05-26 NOTE — PROGRESS NOTES
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 measuring 2 2 cm retrocaval lymphadenopathy measuring 1 6 cm and left iliac lymphadenopathy status post right orchiectomy on 12/2022 stage I (T1b, NX, MX, S0) pure seminoma unifocal primary 4 5 cm and negative margins no lymphovascular invasion, lymphadenopathy in the retroperitoneal area repeat CAT scan in February 2023 showed stable however enlarged lymph node     This is stage II     Now with mild elevation of LDH which is more consistent with metastatic and aggressiveness of the disease     Repeat biopsy of the lymph node showed no evidence of viable tumor however a lot of necrosis with could be seen in seminoma     He received EP on March 2023, initially he reported tingling and numbness we had to stop cisplatin  CT scan on 5/9/2023 showed 2 para-aortic lymph node remain minimally increased in size and changed no new enlarged lymph nodes    Now with double vision, weakness on the right side, flat and labile affect, will do MRI of the brain with and without contrast    Previous MRI of the brain at [facility] showed 3 mm focus of enhancement in the lateral aspect of the left IAC may be schwannoma versus inflammation    Discontinue further chemotherapy      Labs and imaging studies are reviewed by ordering provider once results are available  If there are findings that need immediate attention, you will be contacted when results available  Discussing results and the implication on your healthcare is best discussed in person at your follow-up visit         HPI:    29-year-old  male with history of hypertension, morbid obesity, periumbilical hernia, fatty liver, fluid retention, who felt a mass in the left testicle, on 6/18/2021 he was seen in the emergency room, ultrasound showed heterogeneous mass suspicious for malignancy referred for urology however he did not show up     According to the patient this mass has been growing up slowly, requesting evaluation by urology on 12/2022, CT scan of the abdomen and pelvis showed abnormal appearing retroperitoneal lymph nodes for example aortocaval lymph node measuring 2 2 x 2 2 cm, retrocaval lymph node measuring 1 6 x 1 5 cm, enlarged left iliac lymph node measuring 2 x 2 centimeter, fat stranding on the right anterior common/external iliac artery     Ultrasound of the scrotum showed left testicular nodules consistent with neoplasm and a small hydrocele the largest measuring 2 3 x 1 8 x 2 cm in the upper pole     Alpha-fetoprotein, hCG, LDH were normal before the surgery     Status post radical left orchiectomy on 12/14/2022           Final Diagnosis   A  Left testicle and spermatic cord, radical orchiectomy:  -Seminoma, 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   -Largest viable tumor focus measures 0 9 cm in greatest dimension    -All margins negative for carcinoma   -Definitive lymph-vascular invasion not identified, see note  -Extensive necrosis and fibrosis present  -SEE SYNOPTIC REPORT     Comment: Tumor cells are positive for OCT3/4 and  and negative for CD45, CK-AE1/AE3, CD30, glypican-3, AFP, Beta-HCG, and inhibin   Electronically signed by Franz Dakin, DO on 12/21/2022 at  3:04 PM   Note     Small focus of tumor is present within lymphvascular space without surrounding fibrin or clot   This is favored to represent carryover               CLINICAL   Pre-Orchiectomy Serum Tumor Markers   Serum marker studies within normal limits    Post-Orchiectomy Serum Tumor Markers   Unknown    Serum Tumor Markers (S)   S0 (serum marker study levels within normal limits)    SPECIMEN   Specimen Laterality   Left    TUMOR   Tumor Focality   Unifocal    Tumor Size   Greatest dimension of main tumor mass (Centimeters): 4 5 cm   Histologic Type   Seminoma    Tumor Extent   Limited to testis    Lymphovascular Invasion   Not identified    MARGINS   Margin Status   All margins negative for tumor    REGIONAL LYMPH NODES   Regional Lymph Node Status   Not applicable (no regional lymph nodes submitted or found)       Stage I (pT1b, PN X, MX, S 0)     He smokes 1 pack of cigarette daily for many years, he has 3 children, his father  with lung cancer     He reported severe acid reflux with tenderness in the epigastrium area denied any headache blurred vision chest pain he reported dyspnea on exertion abdominal pain denied any dysuria hematuria melena hematochezia or change in bowel habits     He does not drink alcohol  Repeat CT scan of the abdomen and pelvis on 2/15/2023 showed multiple enlarged para-aortic/aortocaval lymphadenopathy the largest 2 3 x 1 8 cm did not change compared to the previous CAT scan     LDH increasing to 250 (199 on 2022     Normal hCG and alpha-fetoprotein         Interval History:    Over the past 2 weeks he has double vision, weakness of the right side with tingling and numbness of the right fingers and right toes    Previous Treatment:         Test Results:    Imaging: CT abdomen pelvis wo contrast    Result Date: 5/15/2023  Narrative: CT ABDOMEN AND PELVIS WITHOUT IV CONTRAST INDICATION:   C62 92: Malignant neoplasm of left testis, unspecified whether descended or undescended  History of seminoma, status post orchiectomy  The patient had enlarged lymph nodes and underwent a lymph node biopsy several months ago which ultimately only showed necrosis  Surveillance  COMPARISON: CT, dated 2/15/2023  CT, dated 12/10/2022  CT, dated 2020  TECHNIQUE:  CT examination of the abdomen and pelvis was performed without intravenous contrast  Multiplanar 2D reformatted images were created from the source data   This examination, like all CT scans performed in the [facility], was performed utilizing techniques to minimize radiation dose exposure, including the use of iterative reconstruction and automated exposure control  Radiation dose length product (DLP) for this visit:  1391 7 mGy-cm Enteric contrast was administered  FINDINGS: ABDOMEN LOWER CHEST: Bibasilar atelectasis  LIVER/BILIARY TREE: Liver is diffusely decreased in density consistent with fatty change  No CT evidence of suspicious hepatic mass  Normal hepatic contours  No biliary dilatation  GALLBLADDER:  No calcified gallstones  No pericholecystic inflammatory change  SPLEEN:  Unremarkable  PANCREAS:  Unremarkable  ADRENAL GLANDS:  Unremarkable  KIDNEYS/URETERS:  Unremarkable  No hydronephrosis  STOMACH AND BOWEL:  Unremarkable  APPENDIX:  No findings to suggest appendicitis  ABDOMINOPELVIC CAVITY: 2 para-aortic lymph nodes remain minimally enlarged, measuring 12 mm respectively (series 201, image 101 and 116)  This is unchanged from the study in February and they remain decreased in caliber from the study in December  No new enlarged lymph nodes  VESSELS:  Unremarkable for patient's age  PELVIS REPRODUCTIVE ORGANS:  Unremarkable for patient's age  URINARY BLADDER:  Unremarkable  ABDOMINAL WALL/INGUINAL REGIONS: Fat-containing umbilical hernia  OSSEOUS STRUCTURES:  No acute fracture or destructive osseous lesion  Impression: 2 para-aortic lymph nodes remain minimally increased in size, unchanged  One of these was biopsied and returned back as necrosis without viable tumor in January  No new enlarged lymph nodes  No other findings to suggest metastatic disease  Workstation performed: [id number]     XR chest 2 views    Result Date: 5/4/2023  Narrative: CHEST INDICATION:   sob  COMPARISON: CXR 12/10/2022 and chest CT 12/12/2022  EXAM PERFORMED/VIEWS:  XR CHEST PA & LATERAL  FINDINGS: Right port in lower SVC  Cardiomediastinal silhouette normal  No acute disease with mild bibasilar atelectasis, greater on the left  No effusion or pneumothorax   Upper abdomen normal  Bones normal for age  Old right clavicle fracture  Impression: Benign bibasilar linear atelectasis, greater on the left, with no acute disease  Workstation performed: [id number]     CT head without contrast    Result Date: 5/4/2023  Narrative: CT BRAIN - WITHOUT CONTRAST INDICATION:   vertigo, cancer treatment  COMPARISON: Outside CT head 3/8/2023 TECHNIQUE:  CT examination of the brain was performed  Multiplanar 2D reformatted images were created from the source data  Radiation dose length product (DLP) for this visit:  941 mGy-cm   This examination, like all CT scans performed in the [facility], was performed utilizing techniques to minimize radiation dose exposure, including the use of iterative reconstruction and automated exposure control  IMAGE QUALITY:  Diagnostic  FINDINGS: PARENCHYMA:  No intracranial mass, mass effect or midline shift  No CT signs of acute infarction  No acute parenchymal hemorrhage  Minimal nonspecific patchy left frontoparietal subcortical white matter hypodensity  VENTRICLES AND EXTRA-AXIAL SPACES:  Normal for the patient's age  VISUALIZED ORBITS: Normal visualized orbits  PARANASAL SINUSES: Trace mucosal thickening scattered in the paranasal sinuses  CALVARIUM AND EXTRACRANIAL SOFT TISSUES:  Normal      Impression: No evidence of acute intracranial process  Stable minimal patchy left frontoparietal subcortical white matter disease  This is a nonspecific finding and may be sequela of precocious microangiopathy or demyelination among other etiologies   Workstation performed: [id number]       Labs:   Lab Results   Component Value Date    HCT 36 8 05/22/2023    HGB 12 2 05/22/2023    MCV 99 (H) 05/22/2023     05/22/2023    WBC 11 54 (H) 05/22/2023     Lab Results   Component Value Date    ALKPHOS 128 (H) 05/22/2023    ALT 68 (H) 05/22/2023    AST 26 05/22/2023    BUN 16 05/22/2023    CALCIUM 9 4 05/22/2023    CL 97 05/22/2023    CO2 35 (H) 05/22/2023 "   CREATININE 0 84 05/22/2023    EGFR 112 05/22/2023    GLUF 90 04/30/2023    K 3 4 (L) 05/22/2023       No results found for: \"FERRITIN\", \"IRON\", \"TIBC\"    Lab Results   Component Value Date    SIODVADU88 696 07/19/2022         ROS: Review of Systems   Constitutional: Negative  Negative for appetite change, chills, diaphoresis, fatigue, fever and unexpected weight change  HENT:   Negative for hearing loss, lump/mass, mouth sores, nosebleeds, sore throat, trouble swallowing and voice change  Eyes: Negative  Negative for eye problems (Double vision over the past 2 weeks) and icterus  Respiratory: Negative  Negative for chest tightness, cough, hemoptysis and shortness of breath  Cardiovascular: Negative for chest pain and leg swelling  Gastrointestinal: Negative for abdominal distention, abdominal pain, blood in stool, constipation, diarrhea and nausea  Endocrine: Negative  Genitourinary: Negative for dysuria, frequency, hematuria and pelvic pain  Musculoskeletal: Negative  Negative for arthralgias, back pain, flank pain, gait problem, myalgias and neck stiffness  Skin: Negative for itching and rash  Neurological: Negative for dizziness, gait problem, headaches, light-headedness, numbness (Right side) and speech difficulty  Hematological: Negative for adenopathy  Does not bruise/bleed easily  Psychiatric/Behavioral: Positive for confusion, decreased concentration and sleep disturbance  Negative for depression  The patient is not nervous/anxious  Current Medications: Reviewed  Allergies: Reviewed  PMH/FH/SH:  Reviewed      Physical Exam:    There is no height or weight on file to calculate BSA      Wt Readings from Last 3 Encounters:   05/26/23 (!) 141 kg (309 lb 15 5 oz)   05/25/23 (!) 140 kg (308 lb 8 oz)   05/24/23 (!) 141 kg (311 lb 4 6 oz)        Temp Readings from Last 3 Encounters:   05/26/23 (!) 97 1 °F (36 2 °C) (Temporal)   05/25/23 (!) 97 4 °F (36 3 °C) (Temporal) " 23 97 5 °F (36 4 °C) (Temporal)        BP Readings from Last 3 Encounters:   23 120/82   23 119/82   23 130/87         Pulse Readings from Last 3 Encounters:   23 80   23 86   23 83        Physical Exam  Vitals reviewed  Constitutional:       General: He is not in acute distress  Appearance: He is well-developed  He is obese  He is ill-appearing  He is not diaphoretic  HENT:      Head: Normocephalic and atraumatic  Eyes:      Conjunctiva/sclera: Conjunctivae normal    Neck:      Trachea: No tracheal deviation  Cardiovascular:      Rate and Rhythm: Normal rate and regular rhythm  Heart sounds: No murmur heard  No friction rub  No gallop  Pulmonary:      Effort: Pulmonary effort is normal  No respiratory distress  Breath sounds: Normal breath sounds  No wheezing or rales  Chest:      Chest wall: No tenderness  Abdominal:      General: There is no distension  Palpations: Abdomen is soft  Tenderness: There is no abdominal tenderness  Musculoskeletal:      Cervical back: Normal range of motion and neck supple  Lymphadenopathy:      Cervical: No cervical adenopathy  Skin:     General: Skin is warm and dry  Coloration: Skin is not pale  Findings: No erythema  Neurological:      Mental Status: He is alert and oriented to person, place, and time  Motor: Weakness present  Coordination: Coordination abnormal       Gait: Gait abnormal    Psychiatric:         Mood and Affect: Mood is depressed  Affect is labile and blunt  Speech: Speech is delayed  Behavior: Behavior is slowed  Thought Content: Thought content normal          Judgment: Judgment is inappropriate  ECO    Goals and Barriers:  Current Goal: Minimize effects of disease  Barriers: None  Patient's Capacity to Self Care:  Patient is able to self care      Code Status: [unfilled]

## 2023-05-26 NOTE — PROGRESS NOTES
Patient to infusion for Day 4 Etoposide  He continues to refuse post treatment bolus, Ammy Gudino RN aware  Port remained accessed from yesterdays treatment  Good blood return noted  He offered no complaints to me, however when Dr Meli Temple arrived pt stated to him he has been having double vision  MRI of brain to be ordered  Pt also had an incontinent episode while leaving today  He urinated on himself and did not realize it until we pointed it out to him, office aware of this as well  Dr Meli Temple cancelled all future chemotherapy for now and per Ammy Gudino RN pt will not need Josephine Gorodn 5/27/23

## 2023-05-27 ENCOUNTER — HOSPITAL ENCOUNTER (OUTPATIENT)
Dept: INFUSION CENTER | Facility: CLINIC | Age: 38
End: 2023-05-27

## 2023-06-02 ENCOUNTER — HOSPITAL ENCOUNTER (OUTPATIENT)
Dept: NON INVASIVE DIAGNOSTICS | Facility: CLINIC | Age: 38
Discharge: HOME | End: 2023-06-02

## 2023-06-02 VITALS
WEIGHT: 309 LBS | HEART RATE: 100 BPM | HEIGHT: 75 IN | BODY MASS INDEX: 38.42 KG/M2 | DIASTOLIC BLOOD PRESSURE: 82 MMHG | SYSTOLIC BLOOD PRESSURE: 120 MMHG

## 2023-06-02 DIAGNOSIS — I50.21 ACUTE SYSTOLIC CONGESTIVE HEART FAILURE (HCC): ICD-10-CM

## 2023-06-02 LAB
AORTIC ROOT: 3.2 CM
APICAL FOUR CHAMBER EJECTION FRACTION: 70 %
ASCENDING AORTA: 3.6 CM
FRACTIONAL SHORTENING: 31 (ref 28–44)
INTERVENTRICULAR SEPTUM IN DIASTOLE (PARASTERNAL SHORT AXIS VIEW): 1.3 CM
INTERVENTRICULAR SEPTUM: 1.3 CM (ref 0.6–1.1)
LAAS-AP2: 14.4 CM2
LAAS-AP4: 14.3 CM2
LEFT ATRIUM AREA SYSTOLE SINGLE PLANE A4C: 13.9 CM2
LEFT ATRIUM SIZE: 3.6 CM
LEFT INTERNAL DIMENSION IN SYSTOLE: 2.5 CM (ref 2.1–4)
LEFT VENTRICLE DIASTOLIC VOLUME (MOD BIPLANE): 105 ML
LEFT VENTRICLE SYSTOLIC VOLUME (MOD BIPLANE): 43 ML
LEFT VENTRICULAR INTERNAL DIMENSION IN DIASTOLE: 3.6 CM (ref 3.5–6)
LEFT VENTRICULAR POSTERIOR WALL IN END DIASTOLE: 1.3 CM
LEFT VENTRICULAR STROKE VOLUME: 31 ML
LV EF: 59 %
LVSV (TEICH): 31 ML
RIGHT ATRIUM AREA SYSTOLE A4C: 11.6 CM2
RIGHT VENTRICLE ID DIMENSION: 4 CM
SL CV LEFT ATRIUM LENGTH A2C: 4.2 CM
SL CV LV EF: 65
SL CV PED ECHO LEFT VENTRICLE DIASTOLIC VOLUME (MOD BIPLANE) 2D: 54 ML
SL CV PED ECHO LEFT VENTRICLE SYSTOLIC VOLUME (MOD BIPLANE) 2D: 23 ML
TR MAX PG: 37 MMHG
TR PEAK VELOCITY: 3.1 M/S
TRICUSPID ANNULAR PLANE SYSTOLIC EXCURSION: 2.6 CM
TRICUSPID VALVE PEAK REGURGITATION VELOCITY: 3.05 M/S

## 2023-06-06 DIAGNOSIS — I51.7 DILATED VENTRICLE: Primary | ICD-10-CM

## 2023-06-12 ENCOUNTER — HOSPITAL ENCOUNTER (OUTPATIENT)
Facility: MEDICAL CENTER | Age: 38
Discharge: HOME | End: 2023-06-12
Payer: COMMERCIAL

## 2023-06-12 DIAGNOSIS — C62.92 SEMINOMA OF LEFT TESTIS (HCC): ICD-10-CM

## 2023-06-12 DIAGNOSIS — F31.9 BIPOLAR DISORDER WITH DEPRESSION (HCC): ICD-10-CM

## 2023-06-12 PROCEDURE — 70553 MRI BRAIN STEM W/O & W/DYE: CPT

## 2023-06-12 PROCEDURE — A9585 GADOBUTROL INJECTION: HCPCS | Performed by: INTERNAL MEDICINE

## 2023-06-12 RX ORDER — FLUOXETINE 10 MG/1
CAPSULE ORAL
Qty: 30 CAPSULE | Refills: 3 | Status: SHIPPED | OUTPATIENT
Start: 2023-06-12

## 2023-06-12 RX ADMIN — GADOBUTROL 14 ML: 604.72 INJECTION INTRAVENOUS at 12:31

## 2023-06-13 DIAGNOSIS — F31.9 BIPOLAR DISORDER WITH DEPRESSION (HCC): ICD-10-CM

## 2023-06-13 RX ORDER — QUETIAPINE FUMARATE 25 MG/1
TABLET, FILM COATED ORAL
Qty: 30 TABLET | Refills: 0 | Status: SHIPPED | OUTPATIENT
Start: 2023-06-13

## 2023-06-16 DIAGNOSIS — A69.22 ACUTE LYME DISEASE WITH NEUROLOGICAL DISEASE: Primary | ICD-10-CM

## 2023-06-19 ENCOUNTER — APPOINTMENT (OUTPATIENT)
Dept: LAB | Facility: CLINIC | Age: 38
End: 2023-06-19
Payer: COMMERCIAL

## 2023-06-19 ENCOUNTER — OFFICE VISIT (OUTPATIENT)
Dept: HEMATOLOGY ONCOLOGY | Facility: CLINIC | Age: 38
End: 2023-06-19
Payer: COMMERCIAL

## 2023-06-19 ENCOUNTER — TELEPHONE (OUTPATIENT)
Dept: HEMATOLOGY ONCOLOGY | Facility: CLINIC | Age: 38
End: 2023-06-19

## 2023-06-19 VITALS
HEIGHT: 75 IN | BODY MASS INDEX: 37.55 KG/M2 | TEMPERATURE: 98.3 F | HEART RATE: 81 BPM | DIASTOLIC BLOOD PRESSURE: 84 MMHG | SYSTOLIC BLOOD PRESSURE: 118 MMHG | OXYGEN SATURATION: 97 % | WEIGHT: 302 LBS

## 2023-06-19 DIAGNOSIS — D33.3 UNILATERAL VESTIBULAR SCHWANNOMA (HCC): ICD-10-CM

## 2023-06-19 DIAGNOSIS — I50.21 ACUTE SYSTOLIC CONGESTIVE HEART FAILURE (HCC): ICD-10-CM

## 2023-06-19 DIAGNOSIS — Z11.4 SCREENING FOR HIV (HUMAN IMMUNODEFICIENCY VIRUS): ICD-10-CM

## 2023-06-19 DIAGNOSIS — A69.22 ACUTE LYME DISEASE WITH NEUROLOGICAL DISEASE: ICD-10-CM

## 2023-06-19 DIAGNOSIS — H53.2 DIPLOPIA: Primary | ICD-10-CM

## 2023-06-19 DIAGNOSIS — Z11.59 NEED FOR HEPATITIS C SCREENING TEST: ICD-10-CM

## 2023-06-19 DIAGNOSIS — C62.92 SEMINOMA OF LEFT TESTIS (HCC): ICD-10-CM

## 2023-06-19 PROBLEM — T45.1X5A CHEMOTHERAPY INDUCED NEUTROPENIA (HCC): Status: RESOLVED | Noted: 2023-03-08 | Resolved: 2023-06-19

## 2023-06-19 PROBLEM — R63.5 UNEXPLAINED WEIGHT GAIN: Status: RESOLVED | Noted: 2022-12-10 | Resolved: 2023-06-19

## 2023-06-19 PROBLEM — D70.1 CHEMOTHERAPY INDUCED NEUTROPENIA (HCC): Status: RESOLVED | Noted: 2023-03-08 | Resolved: 2023-06-19

## 2023-06-19 LAB
B BURGDOR IGG+IGM SER-ACNC: 2.7 AI
BNP SERPL-MCNC: 15 PG/ML (ref 0–100)
HCV AB SER QL: NORMAL
HIV 1+2 AB+HIV1 P24 AG SERPL QL IA: NORMAL
HIV 2 AB SERPL QL IA: NORMAL
HIV1 AB SERPL QL IA: NORMAL
HIV1 P24 AG SERPL QL IA: NORMAL

## 2023-06-19 PROCEDURE — 87389 HIV-1 AG W/HIV-1&-2 AB AG IA: CPT

## 2023-06-19 PROCEDURE — 86618 LYME DISEASE ANTIBODY: CPT

## 2023-06-19 PROCEDURE — 86617 LYME DISEASE ANTIBODY: CPT

## 2023-06-19 PROCEDURE — 83880 ASSAY OF NATRIURETIC PEPTIDE: CPT

## 2023-06-19 PROCEDURE — 86803 HEPATITIS C AB TEST: CPT

## 2023-06-19 PROCEDURE — 99214 OFFICE O/P EST MOD 30 MIN: CPT | Performed by: INTERNAL MEDICINE

## 2023-06-19 PROCEDURE — 36415 COLL VENOUS BLD VENIPUNCTURE: CPT

## 2023-06-19 NOTE — TELEPHONE ENCOUNTER
Appointment Confirmation   Who are you speaking with? Patient   If it is not the patient, are they listed on an active communication consent form? Yes   Which provider is the appointment scheduled with? Dr Sabrina Stock   When is the appointment scheduled? Please list date and time 6/19 11:20am 5 MIN LATE WILL BE   At which location is the appointment scheduled to take place? [place]   Did caller verbalize understanding of appointment details?  Yes

## 2023-06-29 ENCOUNTER — TELEPHONE (OUTPATIENT)
Dept: FAMILY MEDICINE CLINIC | Facility: CLINIC | Age: 38
End: 2023-06-29

## 2023-06-29 NOTE — TELEPHONE ENCOUNTER
There should be scanned forms somewhere, unless they are on Merced's desk pending scanning, I do remember filling these out but I do not recollect the details

## 2023-06-29 NOTE — TELEPHONE ENCOUNTER
Forms denied for home care stating that it would be short term? I don't see the forms scanned in yet  states they were sent back to medicaid  Patient needs non skilled home care, cooking,  Cleaning, shower assist (HHA)      Gaviota Luo  [facility]  [phone]

## 2023-07-15 NOTE — PROGRESS NOTES
Cardiology Clinic Note    Juan Alberto Lagunas 41 y.o. male   MRN: [id number]  Encounter: [id number]        Assessment / Plan:    # testicular cancer  S/p right orchiectomy 12/2022  Received cisplatin/etoposide - currently on HOLD    # LVH on echo  Echo reviewed  Suspect 2/2 HTN, obesity, hx meth  Plan going forward is good BP control, weight loss. Would also eval for CROW. Sleep med referral placed. # hx fluid retention / HFpEF  On oral torsemide (with potassium)  In May BNP was 270. Follow up BNP in June was 15. Euvolemic on exam.  Reports if he stops torsemide, gets fluid overloaded. Repeat BMP, BNP    # Palpitations  Check holter    # HTN  Not on meds  BP today 122/74    # Obesity  BMI 38  Weight loss recommended    #Hypertriglyceridemia  Lipid panel in 2022 showed triglycerides of 280. LDL was 82. Lifestyle changes to optimize HDL (diet, exercise, discussed)    # Hx tobacco use  Smoking cessation recommend. He is ready to set a quit date. # Hx of heavy drinking  Has quit drinking 3 years ago    # Hx of meth use  Has quit 3 years ago      Today's Plan Summary:  See above assessment/plan for full details of today's plan. Briefly,     Refer to sleep medicine  Check holter  Due for BMP, BNP          Reason For Visit / Chief Complaint:  Referred by Dr. Andi Lovell (PCP) for a new patient visit for LVH on echo. HPI:   Juan Alberto Lagunas is a 41 y.o.  male with history as noted in the problem list and further detailed in the above assessment and plan. Referred by Dr. Andi Lovell (PCP) for a new patient visit for LVH on echo. As above, the patient has a history of testicular cancer. He had surgery in December 2022. He had some chemotherapy but currently this is on hold. He has a history of high blood pressure, obesity, fluid retention (on oral torsemide), hypertriglyceridemia (not on medications), history of tobacco use. The patient had an echo in June that reported mild increased wall thickness.   He was referred to cardiology. Today, the patient reports SOB and palpitations. Since chemotherapy. The SOB can occur at rest.  It can be increased with walking. No chest pain. No syncope. No current leg swelling (but does get if not taking diuretic). No orthopnea or PND. Having balance issues. Used to work in [employer]. Single. Has 3 kids. Active smoker. No ETOH.  marijuana but no other drugs. Prior heavy drinker (30 pack of beer per day). Prior meth use. Cardiac Imaging personally reviewed:  EKG 5/2024  NSR   Holter or event monitor    Echo   6/2023  Mild increased wall thickness. EF 65%. Normal RV size and function (on my review the RV appears somewhat dilated). Mild TR.       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 HENT: Negative for nosebleeds. Gastrointestinal: Positive for abdominal pain. Negative for abdominal distention and blood in stool. Neurological: Positive for dizziness. Negative for syncope. Psychiatric/Behavioral: Negative for confusion.    14-point ROS completed and negative except as stated above and/or in the HPI. No Known Allergies    Current Outpatient Medications   Medication Instructions   • albuterol (ProAir HFA) 90 mcg/act inhaler 2 puffs, Inhalation, Every 6 hours PRN   • FLUoxetine (PROzac) 10 mg capsule TAKE 1 CAPSULE BY MOUTH EVERY DAY   • meclizine (ANTIVERT) 25 mg, Oral, 3 times daily PRN   • potassium chloride (K-DUR,KLOR-CON) 10 mEq tablet 10 mEq, Oral, Daily   • QUEtiapine (SEROquel) 25 mg tablet TAKE 1 TABLET BY MOUTH DAILY AT BEDTIME   • torsemide (DEMADEX) 20 mg, Oral, Daily       Social History     Socioeconomic History   • Marital status: Single     Spouse name: Not on file   • Number of children: Not on file   • Years of education: Not on file   • Highest education level: Not on file   Occupational History   • Not on file   Tobacco Use   • Smoking status: Every Day     Packs/day: 0.50     Types: Cigarettes     Start date: 1/1/2008   • Smokeless tobacco: Never   Vaping Use   • Vaping Use: Never used   Substance and Sexual Activity   • Alcohol use: Not Currently     Comment: 2 cases of beer daily, stopped 3 years ago   • Drug use: Yes     Types: Marijuana     Comment: Medical marijuana   • Sexual activity: Not Currently   Other Topics Concern   • Not on file   Social History Narrative   • Not on file     Social Determinants of Health     Financial Resource Strain: Not on file   Food Insecurity: Not on file   Transportation Needs: Not on file   Physical Activity: Not on file   Stress: Not on file   Social Connections: Not on file   Intimate Partner Violence: Not on file   Housing Stability: Not on file       Family History   Problem Relation Age of Onset   • Coronary artery disease Maternal Aunt        Physical Exam:  Blood pressure 122/74, pulse 88, resp. rate 22, height 6' 3" (1.905 m), weight (!) 140 kg (309 lb 9.6 oz), SpO2 94 %. Body mass index is 38.7 kg/m².   Wt Readings from Last 3 Encounters:   07/19/23 (!) 140 kg (309 lb 9.6 oz)   06/19/23 (!) 137 kg (302 lb)   06/02/23 (!) 140 kg (309 lb)     Physical Exam  Vitals reviewed. Constitutional:       General: He is not in acute distress. Appearance: He is not toxic-appearing. HENT:      Head: Normocephalic and atraumatic. Eyes:      General: No scleral icterus. Conjunctiva/sclera: Conjunctivae normal.   Neck:      Vascular: No carotid bruit. Comments: JVP not elevated  Cardiovascular:      Rate and Rhythm: Normal rate and regular rhythm. Heart sounds: No murmur heard. No friction rub. No gallop. Pulmonary:      Breath sounds: Normal breath sounds. No wheezing, rhonchi or rales. Abdominal:      General: There is no distension. Palpations: Abdomen is soft. Tenderness: There is no abdominal tenderness. There is no guarding. Comments: Obese abdomen   Musculoskeletal:      Comments: Very trace LE edema   Skin:     Coloration: Skin is not jaundiced or pale. Findings: No erythema. Neurological:      Mental Status: He is alert. Mental status is at baseline. Psychiatric:         Mood and Affect: Mood normal.         Behavior: Behavior normal.         Labs & Results:  Lab Results   Component Value Date    SODIUM 137 05/22/2023    K 3.4 (L) 05/22/2023    CL 97 05/22/2023    CO2 35 (H) 05/22/2023    BUN 16 05/22/2023    CREATININE 0.84 05/22/2023    GLUC 84 05/22/2023    CALCIUM 9.4 05/22/2023       Thank you for the opportunity to participate in the care of this patient.     Marta Donovan MD, [facility]  Advanced Heart Failure and Transplant Cardiologist  Director of [address] [place] [address]

## 2023-07-19 ENCOUNTER — CONSULT (OUTPATIENT)
Dept: CARDIOLOGY CLINIC | Facility: CLINIC | Age: 38
End: 2023-07-19
Payer: COMMERCIAL

## 2023-07-19 VITALS
BODY MASS INDEX: 38.49 KG/M2 | SYSTOLIC BLOOD PRESSURE: 122 MMHG | DIASTOLIC BLOOD PRESSURE: 74 MMHG | WEIGHT: 309.6 LBS | RESPIRATION RATE: 22 BRPM | HEIGHT: 75 IN | HEART RATE: 88 BPM | OXYGEN SATURATION: 94 %

## 2023-07-19 DIAGNOSIS — I51.7 LVH (LEFT VENTRICULAR HYPERTROPHY): ICD-10-CM

## 2023-07-19 DIAGNOSIS — R00.2 PALPITATIONS: ICD-10-CM

## 2023-07-19 DIAGNOSIS — I51.7 DILATED VENTRICLE: ICD-10-CM

## 2023-07-19 DIAGNOSIS — E78.1 PURE HYPERTRIGLYCERIDEMIA: ICD-10-CM

## 2023-07-19 DIAGNOSIS — I50.32 CHRONIC DIASTOLIC HEART FAILURE (HCC): Primary | ICD-10-CM

## 2023-07-19 DIAGNOSIS — I10 PRIMARY HYPERTENSION: ICD-10-CM

## 2023-07-19 PROCEDURE — 99244 OFF/OP CNSLTJ NEW/EST MOD 40: CPT | Performed by: INTERNAL MEDICINE

## 2023-07-19 RX ORDER — AMLODIPINE BESYLATE 10 MG/1
10 TABLET ORAL DAILY
COMMUNITY
Start: 2023-06-13 | End: 2023-07-19

## 2023-07-27 DIAGNOSIS — F31.9 BIPOLAR DISORDER WITH DEPRESSION (HCC): ICD-10-CM

## 2023-07-27 RX ORDER — QUETIAPINE FUMARATE 25 MG/1
25 TABLET, FILM COATED ORAL
Qty: 30 TABLET | Refills: 1 | Status: SHIPPED | OUTPATIENT
Start: 2023-07-27 | End: 2023-08-11 | Stop reason: CLARIF

## 2023-07-28 ENCOUNTER — APPOINTMENT (EMERGENCY)
Dept: RADIOLOGY | Facility: HOSPITAL | Age: 38
DRG: 194 | End: 2023-07-28
Payer: COMMERCIAL

## 2023-07-28 ENCOUNTER — HOSPITAL ENCOUNTER (INPATIENT)
Facility: HOSPITAL | Age: 38
LOS: 8 days | DRG: 194 | End: 2023-08-05
Attending: SURGERY | Admitting: GENERAL PRACTICE
Payer: COMMERCIAL

## 2023-07-28 ENCOUNTER — APPOINTMENT (EMERGENCY)
Dept: CT IMAGING | Facility: HOSPITAL | Age: 38
DRG: 194 | End: 2023-07-28
Payer: COMMERCIAL

## 2023-07-28 DIAGNOSIS — K59.00 CONSTIPATION, UNSPECIFIED CONSTIPATION TYPE: ICD-10-CM

## 2023-07-28 DIAGNOSIS — R41.82 ALTERED MENTAL STATUS: ICD-10-CM

## 2023-07-28 DIAGNOSIS — I50.33 ACUTE ON CHRONIC HEART FAILURE WITH PRESERVED EJECTION FRACTION (HCC): ICD-10-CM

## 2023-07-28 DIAGNOSIS — E87.6 HYPOKALEMIA: ICD-10-CM

## 2023-07-28 DIAGNOSIS — T68.XXXA HYPOTHERMIA, INITIAL ENCOUNTER: ICD-10-CM
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
## 2023-07-28 DIAGNOSIS — R06.2 WHEEZING: ICD-10-CM

## 2023-07-28 DIAGNOSIS — E66.2 OBESITY HYPOVENTILATION SYNDROME (HCC): ICD-10-CM

## 2023-07-28 DIAGNOSIS — E87.1 HYPONATREMIA: ICD-10-CM

## 2023-07-28 PROBLEM — F31.9 BIPOLAR I DISORDER (HCC): Status: ACTIVE | Noted: 2023-07-28

## 2023-07-28 PROBLEM — I50.30 (HFPEF) HEART FAILURE WITH PRESERVED EJECTION FRACTION (HCC): Status: ACTIVE | Noted: 2023-07-28

## 2023-07-28 LAB
ABO GROUP BLD: NORMAL
ABO GROUP BLD: NORMAL
AMPHETAMINES SERPL QL SCN: NEGATIVE
AMPHETAMINES SERPL QL SCN: NEGATIVE
ANION GAP SERPL CALCULATED.3IONS-SCNC: 8 MMOL/L
ANION GAP SERPL CALCULATED.3IONS-SCNC: 8 MMOL/L
BARBITURATES UR QL: NEGATIVE
BARBITURATES UR QL: NEGATIVE
BASE EXCESS BLDA CALC-SCNC: 12 MMOL/L (ref -2–3)
BASE EXCESS BLDA CALC-SCNC: 12 MMOL/L (ref -2–3)
BASOPHILS # BLD AUTO: 0.02 THOUSANDS/ÂΜL (ref 0–0.1)
BASOPHILS # BLD AUTO: 0.02 THOUSANDS/ÂΜL (ref 0–0.1)
BASOPHILS NFR BLD AUTO: 0 % (ref 0–1)
BASOPHILS NFR BLD AUTO: 0 % (ref 0–1)
BENZODIAZ UR QL: NEGATIVE
BENZODIAZ UR QL: NEGATIVE
BILIRUB UR QL STRIP: NEGATIVE
BILIRUB UR QL STRIP: NEGATIVE
BLD GP AB SCN SERPL QL: NEGATIVE
BLD GP AB SCN SERPL QL: NEGATIVE
BNP SERPL-MCNC: 10 PG/ML (ref 0–100)
BNP SERPL-MCNC: 10 PG/ML (ref 0–100)
BUN SERPL-MCNC: 12 MG/DL (ref 5–25)
BUN SERPL-MCNC: 12 MG/DL (ref 5–25)
CA-I BLD-SCNC: 1.19 MMOL/L (ref 1.12–1.32)
CA-I BLD-SCNC: 1.19 MMOL/L (ref 1.12–1.32)
CALCIUM SERPL-MCNC: 9.3 MG/DL (ref 8.4–10.2)
CALCIUM SERPL-MCNC: 9.3 MG/DL (ref 8.4–10.2)
CHLORIDE SERPL-SCNC: 85 MMOL/L (ref 96–108)
CHLORIDE SERPL-SCNC: 85 MMOL/L (ref 96–108)
CLARITY UR: NORMAL
CLARITY UR: NORMAL
CO2 SERPL-SCNC: 36 MMOL/L (ref 21–32)
CO2 SERPL-SCNC: 36 MMOL/L (ref 21–32)
COCAINE UR QL: NEGATIVE
COCAINE UR QL: NEGATIVE
COLOR UR: NORMAL
COLOR UR: NORMAL
CREAT SERPL-MCNC: 0.74 MG/DL (ref 0.6–1.3)
CREAT SERPL-MCNC: 0.74 MG/DL (ref 0.6–1.3)
EOSINOPHIL # BLD AUTO: 0.03 THOUSAND/ÂΜL (ref 0–0.61)
EOSINOPHIL # BLD AUTO: 0.03 THOUSAND/ÂΜL (ref 0–0.61)
EOSINOPHIL NFR BLD AUTO: 1 % (ref 0–6)
EOSINOPHIL NFR BLD AUTO: 1 % (ref 0–6)
ERYTHROCYTE [DISTWIDTH] IN BLOOD BY AUTOMATED COUNT: 13.3 % (ref 11.6–15.1)
ERYTHROCYTE [DISTWIDTH] IN BLOOD BY AUTOMATED COUNT: 13.3 % (ref 11.6–15.1)
ETHANOL SERPL-MCNC: <10 MG/DL
ETHANOL SERPL-MCNC: <10 MG/DL
GFR SERPL CREATININE-BSD FRML MDRD: 118 ML/MIN/1.73SQ M
GFR SERPL CREATININE-BSD FRML MDRD: 118 ML/MIN/1.73SQ M
GLUCOSE SERPL-MCNC: 86 MG/DL (ref 65–140)
GLUCOSE SERPL-MCNC: 86 MG/DL (ref 65–140)
GLUCOSE SERPL-MCNC: 93 MG/DL (ref 65–140)
GLUCOSE SERPL-MCNC: 93 MG/DL (ref 65–140)
GLUCOSE UR STRIP-MCNC: NEGATIVE MG/DL
GLUCOSE UR STRIP-MCNC: NEGATIVE MG/DL
HCO3 BLDA-SCNC: 39.6 MMOL/L (ref 24–30)
HCO3 BLDA-SCNC: 39.6 MMOL/L (ref 24–30)
HCT VFR BLD AUTO: 39.8 % (ref 36.5–49.3)
HCT VFR BLD AUTO: 39.8 % (ref 36.5–49.3)
HCT VFR BLD CALC: 42 % (ref 36.5–49.3)
HCT VFR BLD CALC: 42 % (ref 36.5–49.3)
HGB BLD-MCNC: 13.7 G/DL (ref 12–17)
HGB BLD-MCNC: 13.7 G/DL (ref 12–17)
HGB BLDA-MCNC: 14.3 G/DL (ref 12–17)
HGB BLDA-MCNC: 14.3 G/DL (ref 12–17)
HGB UR QL STRIP.AUTO: NEGATIVE
HGB UR QL STRIP.AUTO: NEGATIVE
IMM GRANULOCYTES # BLD AUTO: 0.05 THOUSAND/UL (ref 0–0.2)
IMM GRANULOCYTES # BLD AUTO: 0.05 THOUSAND/UL (ref 0–0.2)
IMM GRANULOCYTES NFR BLD AUTO: 1 % (ref 0–2)
IMM GRANULOCYTES NFR BLD AUTO: 1 % (ref 0–2)
KETONES UR STRIP-MCNC: NEGATIVE MG/DL
KETONES UR STRIP-MCNC: NEGATIVE MG/DL
LACTATE SERPL-SCNC: 2.1 MMOL/L (ref 0.5–2)
LACTATE SERPL-SCNC: 2.1 MMOL/L (ref 0.5–2)
LEUKOCYTE ESTERASE UR QL STRIP: NEGATIVE
LEUKOCYTE ESTERASE UR QL STRIP: NEGATIVE
LYMPHOCYTES # BLD AUTO: 0.81 THOUSANDS/ÂΜL (ref 0.6–4.47)
LYMPHOCYTES # BLD AUTO: 0.81 THOUSANDS/ÂΜL (ref 0.6–4.47)
LYMPHOCYTES NFR BLD AUTO: 16 % (ref 14–44)
LYMPHOCYTES NFR BLD AUTO: 16 % (ref 14–44)
MAGNESIUM SERPL-MCNC: 1.8 MG/DL (ref 1.9–2.7)
MAGNESIUM SERPL-MCNC: 1.8 MG/DL (ref 1.9–2.7)
MCH RBC QN AUTO: 32.3 PG (ref 26.8–34.3)
MCH RBC QN AUTO: 32.3 PG (ref 26.8–34.3)
MCHC RBC AUTO-ENTMCNC: 34.4 G/DL (ref 31.4–37.4)
MCHC RBC AUTO-ENTMCNC: 34.4 G/DL (ref 31.4–37.4)
MCV RBC AUTO: 94 FL (ref 82–98)
MCV RBC AUTO: 94 FL (ref 82–98)
METHADONE UR QL: NEGATIVE
METHADONE UR QL: NEGATIVE
MONOCYTES # BLD AUTO: 0.39 THOUSAND/ÂΜL (ref 0.17–1.22)
MONOCYTES # BLD AUTO: 0.39 THOUSAND/ÂΜL (ref 0.17–1.22)
MONOCYTES NFR BLD AUTO: 8 % (ref 4–12)
MONOCYTES NFR BLD AUTO: 8 % (ref 4–12)
NEUTROPHILS # BLD AUTO: 3.87 THOUSANDS/ÂΜL (ref 1.85–7.62)
NEUTROPHILS # BLD AUTO: 3.87 THOUSANDS/ÂΜL (ref 1.85–7.62)
NEUTS SEG NFR BLD AUTO: 74 % (ref 43–75)
NEUTS SEG NFR BLD AUTO: 74 % (ref 43–75)
NITRITE UR QL STRIP: NEGATIVE
NITRITE UR QL STRIP: NEGATIVE
NRBC BLD AUTO-RTO: 0 /100 WBCS
NRBC BLD AUTO-RTO: 0 /100 WBCS
OPIATES UR QL SCN: NEGATIVE
OPIATES UR QL SCN: NEGATIVE
OXYCODONE+OXYMORPHONE UR QL SCN: NEGATIVE
OXYCODONE+OXYMORPHONE UR QL SCN: NEGATIVE
PCO2 BLD: 42 MMOL/L (ref 21–32)
PCO2 BLD: 42 MMOL/L (ref 21–32)
PCO2 BLD: 63.7 MM HG (ref 42–50)
PCO2 BLD: 63.7 MM HG (ref 42–50)
PCP UR QL: NEGATIVE
PCP UR QL: NEGATIVE
PH BLD: 7.4 [PH] (ref 7.3–7.4)
PH BLD: 7.4 [PH] (ref 7.3–7.4)
PH UR STRIP.AUTO: 5.5 [PH]
PH UR STRIP.AUTO: 5.5 [PH]
PLATELET # BLD AUTO: 228 THOUSANDS/UL (ref 149–390)
PLATELET # BLD AUTO: 228 THOUSANDS/UL (ref 149–390)
PMV BLD AUTO: 9 FL (ref 8.9–12.7)
PMV BLD AUTO: 9 FL (ref 8.9–12.7)
PO2 BLD: 62 MM HG (ref 35–45)
PO2 BLD: 62 MM HG (ref 35–45)
POTASSIUM BLD-SCNC: 2.8 MMOL/L (ref 3.5–5.3)
POTASSIUM BLD-SCNC: 2.8 MMOL/L (ref 3.5–5.3)
POTASSIUM SERPL-SCNC: 2.8 MMOL/L (ref 3.5–5.3)
POTASSIUM SERPL-SCNC: 2.8 MMOL/L (ref 3.5–5.3)
PROCALCITONIN SERPL-MCNC: 0.09 NG/ML
PROCALCITONIN SERPL-MCNC: 0.09 NG/ML
PROT UR STRIP-MCNC: NEGATIVE MG/DL
PROT UR STRIP-MCNC: NEGATIVE MG/DL
RBC # BLD AUTO: 4.24 MILLION/UL (ref 3.88–5.62)
RBC # BLD AUTO: 4.24 MILLION/UL (ref 3.88–5.62)
RH BLD: POSITIVE
RH BLD: POSITIVE
SAO2 % BLD FROM PO2: 90 % (ref 60–85)
SAO2 % BLD FROM PO2: 90 % (ref 60–85)
SODIUM BLD-SCNC: 129 MMOL/L (ref 136–145)
SODIUM BLD-SCNC: 129 MMOL/L (ref 136–145)
SODIUM SERPL-SCNC: 129 MMOL/L (ref 135–147)
SODIUM SERPL-SCNC: 129 MMOL/L (ref 135–147)
SP GR UR STRIP.AUTO: 1.02 (ref 1–1.03)
SP GR UR STRIP.AUTO: 1.02 (ref 1–1.03)
SPECIMEN EXPIRATION DATE: NORMAL
SPECIMEN EXPIRATION DATE: NORMAL
SPECIMEN SOURCE: ABNORMAL
SPECIMEN SOURCE: ABNORMAL
THC UR QL: POSITIVE
THC UR QL: POSITIVE
TSH SERPL DL<=0.05 MIU/L-ACNC: 1.32 UIU/ML (ref 0.45–4.5)
TSH SERPL DL<=0.05 MIU/L-ACNC: 1.32 UIU/ML (ref 0.45–4.5)
UROBILINOGEN UR STRIP-ACNC: <2 MG/DL
UROBILINOGEN UR STRIP-ACNC: <2 MG/DL
WBC # BLD AUTO: 5.17 THOUSAND/UL (ref 4.31–10.16)
WBC # BLD AUTO: 5.17 THOUSAND/UL (ref 4.31–10.16)

## 2023-07-28 PROCEDURE — 80048 BASIC METABOLIC PNL TOTAL CA: CPT

## 2023-07-28 PROCEDURE — 36415 COLL VENOUS BLD VENIPUNCTURE: CPT

## 2023-07-28 PROCEDURE — 82077 ASSAY SPEC XCP UR&BREATH IA: CPT

## 2023-07-28 PROCEDURE — 96365 THER/PROPH/DIAG IV INF INIT: CPT

## 2023-07-28 PROCEDURE — 83880 ASSAY OF NATRIURETIC PEPTIDE: CPT

## 2023-07-28 PROCEDURE — 82803 BLOOD GASES ANY COMBINATION: CPT

## 2023-07-28 PROCEDURE — 80053 COMPREHEN METABOLIC PANEL: CPT

## 2023-07-28 PROCEDURE — 99291 CRITICAL CARE FIRST HOUR: CPT | Performed by: SURGERY

## 2023-07-28 PROCEDURE — 71045 X-RAY EXAM CHEST 1 VIEW: CPT

## 2023-07-28 PROCEDURE — 84145 PROCALCITONIN (PCT): CPT

## 2023-07-28 PROCEDURE — 70450 CT HEAD/BRAIN W/O DYE: CPT

## 2023-07-28 PROCEDURE — 83605 ASSAY OF LACTIC ACID: CPT

## 2023-07-28 PROCEDURE — 81003 URINALYSIS AUTO W/O SCOPE: CPT

## 2023-07-28 PROCEDURE — 85025 COMPLETE CBC W/AUTO DIFF WBC: CPT

## 2023-07-28 PROCEDURE — 82330 ASSAY OF CALCIUM: CPT

## 2023-07-28 PROCEDURE — 76604 US EXAM CHEST: CPT | Performed by: SURGERY

## 2023-07-28 PROCEDURE — 76705 ECHO EXAM OF ABDOMEN: CPT | Performed by: SURGERY

## 2023-07-28 PROCEDURE — 94760 N-INVAS EAR/PLS OXIMETRY 1: CPT

## 2023-07-28 PROCEDURE — 86901 BLOOD TYPING SEROLOGIC RH(D): CPT | Performed by: SURGERY

## 2023-07-28 PROCEDURE — 84295 ASSAY OF SERUM SODIUM: CPT

## 2023-07-28 PROCEDURE — 99285 EMERGENCY DEPT VISIT HI MDM: CPT

## 2023-07-28 PROCEDURE — 84132 ASSAY OF SERUM POTASSIUM: CPT

## 2023-07-28 PROCEDURE — 84443 ASSAY THYROID STIM HORMONE: CPT

## 2023-07-28 PROCEDURE — 86850 RBC ANTIBODY SCREEN: CPT | Performed by: SURGERY

## 2023-07-28 PROCEDURE — 93308 TTE F-UP OR LMTD: CPT | Performed by: SURGERY

## 2023-07-28 PROCEDURE — 85014 HEMATOCRIT: CPT

## 2023-07-28 PROCEDURE — 80307 DRUG TEST PRSMV CHEM ANLYZR: CPT

## 2023-07-28 PROCEDURE — 94640 AIRWAY INHALATION TREATMENT: CPT

## 2023-07-28 PROCEDURE — 82947 ASSAY GLUCOSE BLOOD QUANT: CPT

## 2023-07-28 PROCEDURE — 83735 ASSAY OF MAGNESIUM: CPT

## 2023-07-28 PROCEDURE — 86900 BLOOD TYPING SEROLOGIC ABO: CPT | Performed by: SURGERY

## 2023-07-28 RX ORDER — POTASSIUM CHLORIDE 14.9 MG/ML
20 INJECTION INTRAVENOUS ONCE
Status: COMPLETED | OUTPATIENT
Start: 2023-07-28 | End: 2023-07-28

## 2023-07-28 RX ORDER — ALBUTEROL SULFATE 90 UG/1
2 AEROSOL, METERED RESPIRATORY (INHALATION) EVERY 4 HOURS PRN
Status: DISCONTINUED | OUTPATIENT
Start: 2023-07-28 | End: 2023-08-05 | Stop reason: HOSPADM

## 2023-07-28 RX ORDER — TORSEMIDE 20 MG/1
20 TABLET ORAL DAILY
Status: DISCONTINUED | OUTPATIENT
Start: 2023-07-29 | End: 2023-07-28

## 2023-07-28 RX ORDER — FUROSEMIDE 10 MG/ML
40 INJECTION INTRAMUSCULAR; INTRAVENOUS ONCE
Status: DISCONTINUED | OUTPATIENT
Start: 2023-07-28 | End: 2023-07-28

## 2023-07-28 RX ORDER — MAGNESIUM SULFATE HEPTAHYDRATE 40 MG/ML
2 INJECTION, SOLUTION INTRAVENOUS ONCE
Status: COMPLETED | OUTPATIENT
Start: 2023-07-28 | End: 2023-07-29

## 2023-07-28 RX ORDER — POTASSIUM CHLORIDE 29.8 MG/ML
40 INJECTION INTRAVENOUS ONCE
Status: DISCONTINUED | OUTPATIENT
Start: 2023-07-28 | End: 2023-07-28

## 2023-07-28 RX ORDER — POTASSIUM CHLORIDE 14.9 MG/ML
20 INJECTION INTRAVENOUS
Status: DISCONTINUED | OUTPATIENT
Start: 2023-07-28 | End: 2023-07-29

## 2023-07-28 RX ORDER — ALBUMIN (HUMAN) 12.5 G/50ML
25 SOLUTION INTRAVENOUS ONCE
Status: COMPLETED | OUTPATIENT
Start: 2023-07-29 | End: 2023-07-29

## 2023-07-28 RX ORDER — SODIUM CHLORIDE, SODIUM GLUCONATE, SODIUM ACETATE, POTASSIUM CHLORIDE, MAGNESIUM CHLORIDE, SODIUM PHOSPHATE, DIBASIC, AND POTASSIUM PHOSPHATE .53; .5; .37; .037; .03; .012; .00082 G/100ML; G/100ML; G/100ML; G/100ML; G/100ML; G/100ML; G/100ML
INJECTION, SOLUTION INTRAVENOUS
Status: COMPLETED | OUTPATIENT
Start: 2023-07-28 | End: 2023-07-28

## 2023-07-28 RX ORDER — ENOXAPARIN SODIUM 100 MG/ML
40 INJECTION SUBCUTANEOUS DAILY
Status: DISCONTINUED | OUTPATIENT
Start: 2023-07-29 | End: 2023-07-29

## 2023-07-28 RX ORDER — SODIUM CHLORIDE AND POTASSIUM CHLORIDE 150; 900 MG/100ML; MG/100ML
75 INJECTION, SOLUTION INTRAVENOUS CONTINUOUS
Status: DISCONTINUED | OUTPATIENT
Start: 2023-07-28 | End: 2023-07-28

## 2023-07-28 RX ORDER — ALBUTEROL SULFATE 2.5 MG/3ML
2.5 SOLUTION RESPIRATORY (INHALATION) EVERY 4 HOURS PRN
Status: DISCONTINUED | OUTPATIENT
Start: 2023-07-28 | End: 2023-08-05 | Stop reason: HOSPADM

## 2023-07-28 RX ADMIN — SODIUM CHLORIDE, SODIUM GLUCONATE, SODIUM ACETATE, POTASSIUM CHLORIDE, MAGNESIUM CHLORIDE, SODIUM PHOSPHATE, DIBASIC, AND POTASSIUM PHOSPHATE 1000 ML: .53; .5; .37; .037; .03; .012; .00082 INJECTION, SOLUTION INTRAVENOUS at 18:25

## 2023-07-28 RX ADMIN — ALBUTEROL SULFATE 2.5 MG: 2.5 SOLUTION RESPIRATORY (INHALATION) at 23:00

## 2023-07-28 RX ADMIN — MAGNESIUM SULFATE HEPTAHYDRATE 2 G: 40 INJECTION, SOLUTION INTRAVENOUS at 23:33

## 2023-07-28 RX ADMIN — SODIUM CHLORIDE AND POTASSIUM CHLORIDE 75 ML/HR: .9; .15 SOLUTION INTRAVENOUS at 22:33

## 2023-07-28 RX ADMIN — POTASSIUM CHLORIDE 20 MEQ: 14.9 INJECTION, SOLUTION INTRAVENOUS at 23:33

## 2023-07-28 RX ADMIN — POTASSIUM CHLORIDE 20 MEQ: 14.9 INJECTION, SOLUTION INTRAVENOUS at 19:52

## 2023-07-28 NOTE — H&P
[facility]  H&P  Name: Guillermo Moreira 41 y.o. male I MRN: [id number]  Unit/Bed#: ED-18 I Date of Admission: 7/28/2023   Date of Service: 7/28/2023 I Hospital Day: 0      Assessment/Plan   Altered mental status  Assessment & Plan  - Patient more somnolent, speaking minimally  - GCS 15, patient responds to voice appropriately and is oriented to person, place, time  - CT head without acute changes  - iSTAT showing hypokalemia, hyponatremia  - Labs ordered and general medicine consulted, plan to admit to medicine service for further workup at this time    [address] [place]  - Fall at home 3d ago with unclear etiology, no mechanical source identified  - Patient not reporting any headache or other pain on arrival  - CT head without evidence of acute traumatic injury  - Trauma team will sign-off on patient          Trauma Alert: Level B   Model of Arrival: Ambulance    Trauma Team: Attending Niraj Perez DO and resident Kody Gordon DO  Consultants:     None     History of Present Illness     Chief Complaint: Fall, AMS  Mechanism: Unclear    HPI:    Guillermo Moreira is a 41 y.o. male who presents with fall, change in mental status. Patient is minimally verbally responsive and generally poor historian. According to family at bedside, he lives at home with his girlfriend who saw him fall once reaching the top of stairs and strike his head on carpeted floor. For the last 3 days since the fall, he has been less responsive overall, more somnolent, not eating or drinking as much. They report he has a history of testicular cancer but stopped chemotherapy in May of this year and also has a medical marijuana card with frequent use at home. Although he does have history of EtOH and methamphetamine use has been off these for many years now.  At his baseline, he is able to ambulate, does not require supplemental oxygen, and is generally alert and conversational.    Review of Systems Constitutional: Negative for chills and fever. HENT: Negative. Eyes: Negative. Respiratory: Negative for cough and shortness of breath. Cardiovascular: Negative for chest pain. Gastrointestinal: Negative for abdominal pain, nausea and vomiting. Genitourinary: Negative. Musculoskeletal: Negative for back pain and neck pain. Neurological: Negative for weakness, light-headedness and headaches. Psychiatric/Behavioral: Positive for behavioral problems. 12-point, complete review of systems was reviewed and negative except as stated above. Historical Information     History reviewed. No pertinent past medical history. History reviewed. No pertinent surgical history. There is no immunization history on file for this patient.   Last Tetanus:  04/2023  Family History: Non-contributory     Meds/Allergies   all current active meds have been reviewed   No Known Allergies    Objective   Initial Vitals:   Temperature: (!) 90.3 °F (32.4 °C) (07/28/23 1814)  Pulse: 61 (07/28/23 1814)  Respirations: 20 (07/28/23 1814)  Blood Pressure: 138/62 (07/28/23 1814)    Primary Survey:   Airway:        Status: patent;        Pre-hospital Interventions: none        Hospital Interventions: none  Breathing:        Pre-hospital Interventions: oxygen       Effort: normal       Right breath sounds: normal       Left breath sounds: normal  Circulation:        Rhythm: regular       Rate: regular   Right Pulses Left Pulses    R radial: 2+    R pedal: 1+     L radial: 2+    L pedal: 1+       Disability:        GCS: Eye: 4; Verbal: 5 Motor: 6 Total: 15       Right Pupil: 4 mm;  round;  reactive         Left Pupil:  4 mm;  round;  reactive      R Motor Strength L Motor Strength    R : 5/5  R dorsiflex: 5/5  R plantarflex: 5/5 L : 5/5  L dorsiflex: 5/5  L plantarflex: 5/5        Sensory:  No sensory deficit  Exposure:       Completed: Yes      Secondary Survey:  Physical Exam  Constitutional:       Comments: Tired-appearing   HENT:      Head: Normocephalic and atraumatic. Right Ear: External ear normal.      Left Ear: External ear normal.      Nose: Nose normal.      Mouth/Throat:      Mouth: Mucous membranes are moist.   Eyes:      Extraocular Movements: Extraocular movements intact. Conjunctiva/sclera: Conjunctivae normal.      Pupils: Pupils are equal, round, and reactive to light. Cardiovascular:      Rate and Rhythm: Normal rate and regular rhythm. Heart sounds: Normal heart sounds. Pulmonary:      Effort: Pulmonary effort is normal.      Breath sounds: Normal breath sounds. Abdominal:      General: There is no distension. Palpations: Abdomen is soft. Tenderness: There is no abdominal tenderness. Comments: Umbilical hernia noted   Musculoskeletal:         General: No tenderness or deformity. Normal range of motion. Cervical back: Normal range of motion. No tenderness. Skin:     General: Skin is dry. Capillary Refill: Capillary refill takes less than 2 seconds. Comments: cool   Neurological:      Mental Status: He is oriented to person, place, and time. He is lethargic. Invasive Devices     Peripheral Intravenous Line  Duration           Peripheral IV 07/28/23 Left Hand <1 day              Lab Results: I have personally reviewed all pertinent laboratory/test results from 07/28/23, including the preceding 24 hours. Recent Labs     07/28/23  1820 07/28/23  1855   WBC  --  5.17   HGB 14.3 13.7   HCT 42 39.8   PLT  --  228   SODIUM  --  129*   K  --  2.8*   CL  --  85*   CO2 42* 36*   BUN  --  12   CREATININE  --  0.74   GLUC  --  86   CAIONIZED 1.19  --        Imaging Results: I have personally reviewed pertinent images saved in PACS. CT scan findings (and other pertinent positive findings on images) were discussed with radiology.  My interpretation of the images/reports are as follows:  Chest Xray(s): negative for acute findings   FAST exam(s): negative for acute findings   CT Scan(s): negative for acute findings   Additional Xray(s): N/A     Other Studies: N/A    Code Status: No Order  Advance Directive and Living Will:      Power of :    POLST:    I have spent 45 minutes with Patient and family today in which greater than 50% of this time was spent in counseling/coordination of care regarding Diagnostic results, Prognosis, Risks and benefits of tx options, Documenting in the medical record, Reviewing / ordering tests, medicine, procedures  , Obtaining or reviewing history   and Communicating with other healthcare professionals .

## 2023-07-28 NOTE — ED PROVIDER NOTES
Emergency Department Airway Evaluation and Management Form    History  Obtained from: EMS  Patient has no allergy information on record. No chief complaint on file. 45-year-old male brought by EMS for evaluation after a fall with head strike 2 days ago with change in mental status per family after the fall. No anticoagulation. Patient is awake and alert on arrival.  Arrived on 2 L nasal cannula oxygen with saturations 92 to 94%. We will continue supplemental oxygen. No other acute airway intervention needed. Further management per trauma team.    No past medical history on file. No past surgical history on file. No family history on file. I have reviewed and agree with the history as documented.     Review of Systems    Physical Exam  /62   Pulse 61   Temp (!) 90.3 °F (32.4 °C) (Rectal)   Resp 20   Wt (!) 159 kg (351 lb 6.6 oz)   SpO2 94%     Physical Exam    ED Medications  Medications - No data to display    Intubation  Procedures    Notes      Final Diagnosis  Final diagnoses:   None       ED Provider  Electronically Signed by     Deep Tan MD  07/28/23 6228

## 2023-07-28 NOTE — PROCEDURES
POC FAST US    Date/Time: 7/28/2023 6:28 PM    Performed by: Juan Perdue DO  Authorized by: Juan Perdue DO    Patient location:  Trauma  Procedure details:     Exam Type:  Diagnostic    Indications: blunt abdominal trauma      Assess for:  Hemothorax, intra-abdominal fluid, pneumothorax and pericardial effusion    Technique: extended FAST      Views obtained:  Heart - Pericardial sac, Left thorax, RUQ - Doe's Pouch, Right thorax, LUQ - Splenorenal space and Suprapubic - Pouch of Andrey    Image quality: diagnostic      Image availability:  Video obtained  FAST Findings:     RUQ (Hepatorenal) free fluid: absent      LUQ (Splenorenal) free fluid: absent      Suprapubic free fluid: absent      Cardiac wall motion: identified      Pericardial effusion: absent    extended FAST (Pulmonary) findings:     Left lung sliding: Present      Right lung sliding: Present      Left pleural effusion: Absent      Right pleural effusion: Absent    Interpretation:     Impressions: negative

## 2023-07-29 ENCOUNTER — APPOINTMENT (INPATIENT)
Dept: CT IMAGING | Facility: HOSPITAL | Age: 38
DRG: 194 | End: 2023-07-29
Payer: COMMERCIAL

## 2023-07-29 PROBLEM — T68.XXXA HYPOTHERMIA: Status: RESOLVED | Noted: 2023-07-28 | Resolved: 2023-07-29

## 2023-07-29 PROBLEM — C62.90 TESTICULAR CANCER (HCC): Status: ACTIVE | Noted: 2023-07-29

## 2023-07-29 PROBLEM — R65.10 SIRS (SYSTEMIC INFLAMMATORY RESPONSE SYNDROME) (HCC): Status: ACTIVE | Noted: 2023-07-29

## 2023-07-29 PROBLEM — G47.33 OSA (OBSTRUCTIVE SLEEP APNEA): Status: ACTIVE | Noted: 2023-07-29

## 2023-07-29 PROBLEM — E66.2 OBESITY HYPOVENTILATION SYNDROME (HCC): Status: ACTIVE | Noted: 2023-07-29

## 2023-07-29 PROBLEM — A41.9 SEPSIS (HCC): Status: ACTIVE | Noted: 2023-07-29

## 2023-07-29 LAB
ALBUMIN SERPL BCP-MCNC: 3.5 G/DL (ref 3.5–5)
ALBUMIN SERPL BCP-MCNC: 3.5 G/DL (ref 3.5–5)
ALBUMIN SERPL BCP-MCNC: 3.9 G/DL (ref 3.5–5)
ALBUMIN SERPL BCP-MCNC: 3.9 G/DL (ref 3.5–5)
ALP SERPL-CCNC: 118 U/L (ref 34–104)
ALP SERPL-CCNC: 118 U/L (ref 34–104)
ALP SERPL-CCNC: 119 U/L (ref 34–104)
ALP SERPL-CCNC: 119 U/L (ref 34–104)
ALT SERPL W P-5'-P-CCNC: 155 U/L (ref 7–52)
ALT SERPL W P-5'-P-CCNC: 155 U/L (ref 7–52)
ALT SERPL W P-5'-P-CCNC: 156 U/L (ref 7–52)
ALT SERPL W P-5'-P-CCNC: 156 U/L (ref 7–52)
ANION GAP SERPL CALCULATED.3IONS-SCNC: 10 MMOL/L
ANION GAP SERPL CALCULATED.3IONS-SCNC: 10 MMOL/L
ANION GAP SERPL CALCULATED.3IONS-SCNC: 6 MMOL/L
ANION GAP SERPL CALCULATED.3IONS-SCNC: 6 MMOL/L
ANION GAP SERPL CALCULATED.3IONS-SCNC: 9 MMOL/L
ANION GAP SERPL CALCULATED.3IONS-SCNC: 9 MMOL/L
AST SERPL W P-5'-P-CCNC: 75 U/L (ref 13–39)
AST SERPL W P-5'-P-CCNC: 75 U/L (ref 13–39)
AST SERPL W P-5'-P-CCNC: 77 U/L (ref 13–39)
AST SERPL W P-5'-P-CCNC: 77 U/L (ref 13–39)
BILIRUB SERPL-MCNC: 0.37 MG/DL (ref 0.2–1)
BILIRUB SERPL-MCNC: 0.37 MG/DL (ref 0.2–1)
BILIRUB SERPL-MCNC: 0.46 MG/DL (ref 0.2–1)
BILIRUB SERPL-MCNC: 0.46 MG/DL (ref 0.2–1)
BUN SERPL-MCNC: 10 MG/DL (ref 5–25)
BUN SERPL-MCNC: 11 MG/DL (ref 5–25)
BUN SERPL-MCNC: 11 MG/DL (ref 5–25)
CALCIUM SERPL-MCNC: 8.8 MG/DL (ref 8.4–10.2)
CALCIUM SERPL-MCNC: 8.8 MG/DL (ref 8.4–10.2)
CALCIUM SERPL-MCNC: 8.9 MG/DL (ref 8.4–10.2)
CALCIUM SERPL-MCNC: 8.9 MG/DL (ref 8.4–10.2)
CALCIUM SERPL-MCNC: 9 MG/DL (ref 8.4–10.2)
CALCIUM SERPL-MCNC: 9 MG/DL (ref 8.4–10.2)
CHLORIDE SERPL-SCNC: 87 MMOL/L (ref 96–108)
CHLORIDE SERPL-SCNC: 88 MMOL/L (ref 96–108)
CHLORIDE SERPL-SCNC: 88 MMOL/L (ref 96–108)
CO2 SERPL-SCNC: 34 MMOL/L (ref 21–32)
CO2 SERPL-SCNC: 34 MMOL/L (ref 21–32)
CO2 SERPL-SCNC: 35 MMOL/L (ref 21–32)
CREAT SERPL-MCNC: 0.7 MG/DL (ref 0.6–1.3)
CREAT SERPL-MCNC: 0.7 MG/DL (ref 0.6–1.3)
CREAT SERPL-MCNC: 0.78 MG/DL (ref 0.6–1.3)
CREAT SERPL-MCNC: 0.78 MG/DL (ref 0.6–1.3)
CREAT SERPL-MCNC: 0.83 MG/DL (ref 0.6–1.3)
CREAT SERPL-MCNC: 0.83 MG/DL (ref 0.6–1.3)
ERYTHROCYTE [DISTWIDTH] IN BLOOD BY AUTOMATED COUNT: 13.8 % (ref 11.6–15.1)
ERYTHROCYTE [DISTWIDTH] IN BLOOD BY AUTOMATED COUNT: 13.8 % (ref 11.6–15.1)
GFR SERPL CREATININE-BSD FRML MDRD: 113 ML/MIN/1.73SQ M
GFR SERPL CREATININE-BSD FRML MDRD: 113 ML/MIN/1.73SQ M
GFR SERPL CREATININE-BSD FRML MDRD: 116 ML/MIN/1.73SQ M
GFR SERPL CREATININE-BSD FRML MDRD: 116 ML/MIN/1.73SQ M
GFR SERPL CREATININE-BSD FRML MDRD: 121 ML/MIN/1.73SQ M
GFR SERPL CREATININE-BSD FRML MDRD: 121 ML/MIN/1.73SQ M
GLUCOSE SERPL-MCNC: 78 MG/DL (ref 65–140)
GLUCOSE SERPL-MCNC: 78 MG/DL (ref 65–140)
GLUCOSE SERPL-MCNC: 81 MG/DL (ref 65–140)
GLUCOSE SERPL-MCNC: 81 MG/DL (ref 65–140)
GLUCOSE SERPL-MCNC: 91 MG/DL (ref 65–140)
GLUCOSE SERPL-MCNC: 91 MG/DL (ref 65–140)
HCT VFR BLD AUTO: 36.1 % (ref 36.5–49.3)
HCT VFR BLD AUTO: 36.1 % (ref 36.5–49.3)
HGB BLD-MCNC: 12.2 G/DL (ref 12–17)
HGB BLD-MCNC: 12.2 G/DL (ref 12–17)
LACTATE SERPL-SCNC: 2 MMOL/L (ref 0.5–2)
LACTATE SERPL-SCNC: 2 MMOL/L (ref 0.5–2)
MCH RBC QN AUTO: 32 PG (ref 26.8–34.3)
MCH RBC QN AUTO: 32 PG (ref 26.8–34.3)
MCHC RBC AUTO-ENTMCNC: 33.8 G/DL (ref 31.4–37.4)
MCHC RBC AUTO-ENTMCNC: 33.8 G/DL (ref 31.4–37.4)
MCV RBC AUTO: 95 FL (ref 82–98)
MCV RBC AUTO: 95 FL (ref 82–98)
PLATELET # BLD AUTO: 210 THOUSANDS/UL (ref 149–390)
PLATELET # BLD AUTO: 210 THOUSANDS/UL (ref 149–390)
PMV BLD AUTO: 9 FL (ref 8.9–12.7)
PMV BLD AUTO: 9 FL (ref 8.9–12.7)
POTASSIUM SERPL-SCNC: 3.1 MMOL/L (ref 3.5–5.3)
POTASSIUM SERPL-SCNC: 3.1 MMOL/L (ref 3.5–5.3)
POTASSIUM SERPL-SCNC: 3.2 MMOL/L (ref 3.5–5.3)
POTASSIUM SERPL-SCNC: 3.2 MMOL/L (ref 3.5–5.3)
POTASSIUM SERPL-SCNC: 3.6 MMOL/L (ref 3.5–5.3)
POTASSIUM SERPL-SCNC: 3.6 MMOL/L (ref 3.5–5.3)
PROT SERPL-MCNC: 6.1 G/DL (ref 6.4–8.4)
PROT SERPL-MCNC: 6.1 G/DL (ref 6.4–8.4)
PROT SERPL-MCNC: 6.4 G/DL (ref 6.4–8.4)
PROT SERPL-MCNC: 6.4 G/DL (ref 6.4–8.4)
RBC # BLD AUTO: 3.81 MILLION/UL (ref 3.88–5.62)
RBC # BLD AUTO: 3.81 MILLION/UL (ref 3.88–5.62)
SARS-COV-2 RNA RESP QL NAA+PROBE: NEGATIVE
SARS-COV-2 RNA RESP QL NAA+PROBE: NEGATIVE
SODIUM SERPL-SCNC: 129 MMOL/L (ref 135–147)
SODIUM SERPL-SCNC: 129 MMOL/L (ref 135–147)
SODIUM SERPL-SCNC: 131 MMOL/L (ref 135–147)
WBC # BLD AUTO: 5.78 THOUSAND/UL (ref 4.31–10.16)
WBC # BLD AUTO: 5.78 THOUSAND/UL (ref 4.31–10.16)

## 2023-07-29 PROCEDURE — 99223 1ST HOSP IP/OBS HIGH 75: CPT | Performed by: INTERNAL MEDICINE

## 2023-07-29 PROCEDURE — G1004 CDSM NDSC: HCPCS

## 2023-07-29 PROCEDURE — 94660 CPAP INITIATION&MGMT: CPT

## 2023-07-29 PROCEDURE — 94760 N-INVAS EAR/PLS OXIMETRY 1: CPT

## 2023-07-29 PROCEDURE — 92610 EVALUATE SWALLOWING FUNCTION: CPT

## 2023-07-29 PROCEDURE — 74177 CT ABD & PELVIS W/CONTRAST: CPT

## 2023-07-29 PROCEDURE — 71260 CT THORAX DX C+: CPT

## 2023-07-29 PROCEDURE — 87040 BLOOD CULTURE FOR BACTERIA: CPT

## 2023-07-29 PROCEDURE — 83605 ASSAY OF LACTIC ACID: CPT

## 2023-07-29 PROCEDURE — 87635 SARS-COV-2 COVID-19 AMP PRB: CPT

## 2023-07-29 PROCEDURE — 80053 COMPREHEN METABOLIC PANEL: CPT

## 2023-07-29 PROCEDURE — 80048 BASIC METABOLIC PNL TOTAL CA: CPT

## 2023-07-29 PROCEDURE — 85027 COMPLETE CBC AUTOMATED: CPT

## 2023-07-29 RX ORDER — TORSEMIDE 20 MG/1
20 TABLET ORAL DAILY
Status: DISCONTINUED | OUTPATIENT
Start: 2023-07-29 | End: 2023-07-29

## 2023-07-29 RX ORDER — QUETIAPINE FUMARATE 25 MG/1
25 TABLET, FILM COATED ORAL
Status: DISCONTINUED | OUTPATIENT
Start: 2023-07-29 | End: 2023-07-29

## 2023-07-29 RX ORDER — TORSEMIDE 20 MG/1
20 TABLET ORAL DAILY
COMMUNITY
End: 2023-08-05

## 2023-07-29 RX ORDER — FLUOXETINE 10 MG/1
10 CAPSULE ORAL DAILY
Status: DISCONTINUED | OUTPATIENT
Start: 2023-07-29 | End: 2023-07-29

## 2023-07-29 RX ORDER — POTASSIUM CHLORIDE 14.9 MG/ML
20 INJECTION INTRAVENOUS
Status: COMPLETED | OUTPATIENT
Start: 2023-07-29 | End: 2023-07-29

## 2023-07-29 RX ORDER — SODIUM CHLORIDE 9 MG/ML
75 INJECTION, SOLUTION INTRAVENOUS CONTINUOUS
Status: DISCONTINUED | OUTPATIENT
Start: 2023-07-29 | End: 2023-07-29

## 2023-07-29 RX ORDER — SODIUM CHLORIDE AND POTASSIUM CHLORIDE 150; 900 MG/100ML; MG/100ML
75 INJECTION, SOLUTION INTRAVENOUS CONTINUOUS
Status: DISCONTINUED | OUTPATIENT
Start: 2023-07-29 | End: 2023-07-29

## 2023-07-29 RX ORDER — FLUOXETINE 10 MG/1
10 CAPSULE ORAL DAILY
COMMUNITY
End: 2023-08-20 | Stop reason: SDUPTHER

## 2023-07-29 RX ORDER — FLUOXETINE 10 MG/1
10 CAPSULE ORAL DAILY
Status: DISCONTINUED | OUTPATIENT
Start: 2023-07-29 | End: 2023-08-05 | Stop reason: HOSPADM

## 2023-07-29 RX ORDER — POTASSIUM CHLORIDE 750 MG/1
10 TABLET, EXTENDED RELEASE ORAL DAILY
COMMUNITY
End: 2023-08-05

## 2023-07-29 RX ORDER — TORSEMIDE 20 MG/1
20 TABLET ORAL DAILY
Status: DISCONTINUED | OUTPATIENT
Start: 2023-07-30 | End: 2023-08-03

## 2023-07-29 RX ORDER — ENOXAPARIN SODIUM 100 MG/ML
40 INJECTION SUBCUTANEOUS EVERY 12 HOURS SCHEDULED
Status: DISCONTINUED | OUTPATIENT
Start: 2023-07-29 | End: 2023-08-05 | Stop reason: HOSPADM

## 2023-07-29 RX ORDER — MECLIZINE HYDROCHLORIDE 25 MG/1
25 TABLET ORAL 3 TIMES DAILY PRN
COMMUNITY
End: 2023-08-11 | Stop reason: CLARIF

## 2023-07-29 RX ORDER — QUETIAPINE FUMARATE 25 MG/1
25 TABLET, FILM COATED ORAL
COMMUNITY
End: 2023-08-17

## 2023-07-29 RX ORDER — POTASSIUM CHLORIDE 750 MG/1
10 TABLET, EXTENDED RELEASE ORAL DAILY
Status: DISCONTINUED | OUTPATIENT
Start: 2023-07-30 | End: 2023-07-31

## 2023-07-29 RX ORDER — POTASSIUM CHLORIDE 20 MEQ/1
40 TABLET, EXTENDED RELEASE ORAL ONCE
Status: DISCONTINUED | OUTPATIENT
Start: 2023-07-29 | End: 2023-07-29

## 2023-07-29 RX ORDER — QUETIAPINE FUMARATE 25 MG/1
25 TABLET, FILM COATED ORAL
Status: DISCONTINUED | OUTPATIENT
Start: 2023-07-29 | End: 2023-08-05 | Stop reason: HOSPADM

## 2023-07-29 RX ADMIN — POTASSIUM CHLORIDE 20 MEQ: 14.9 INJECTION, SOLUTION INTRAVENOUS at 06:37

## 2023-07-29 RX ADMIN — QUETIAPINE FUMARATE 25 MG: 25 TABLET ORAL at 22:19

## 2023-07-29 RX ADMIN — ENOXAPARIN SODIUM 40 MG: 40 INJECTION SUBCUTANEOUS at 01:57

## 2023-07-29 RX ADMIN — IOHEXOL 100 ML: 350 INJECTION, SOLUTION INTRAVENOUS at 14:52

## 2023-07-29 RX ADMIN — ENOXAPARIN SODIUM 40 MG: 40 INJECTION SUBCUTANEOUS at 08:20

## 2023-07-29 RX ADMIN — TORSEMIDE 20 MG: 20 TABLET ORAL at 11:58

## 2023-07-29 RX ADMIN — ALBUMIN (HUMAN) 25 G: 0.25 INJECTION, SOLUTION INTRAVENOUS at 00:10

## 2023-07-29 RX ADMIN — POTASSIUM CHLORIDE 20 MEQ: 14.9 INJECTION, SOLUTION INTRAVENOUS at 08:20

## 2023-07-29 RX ADMIN — ENOXAPARIN SODIUM 40 MG: 40 INJECTION SUBCUTANEOUS at 22:19

## 2023-07-29 RX ADMIN — SODIUM CHLORIDE AND POTASSIUM CHLORIDE 75 ML/HR: .9; .15 SOLUTION INTRAVENOUS at 01:46

## 2023-07-29 NOTE — ASSESSMENT & PLAN NOTE
· Patient is a poor historian, etiology/mechanism of fall unclear. · Trauma work-up at the ED was unremarkable for any acute traumatic injuries. · MRI brain auditory canal w wo contrast 3/15/2023: 3 mm focus of enhancement in the lateral aspect of the left IAC, possibly a mass lesion such as a schwannoma versus inflammation. Patient is being followed by neurosurgery outpatient. · Plan:  · Per Neurosurgery, serial monitoring was recommended instead of radiation or surgery   · Follow-up with neurosurgery outpatient.   · PT/OT

## 2023-07-29 NOTE — ASSESSMENT & PLAN NOTE
· Met 2 out of 4 SIRS criteria initially (hypothermia, tachypnea), saturating mid 90s on 3 L of oxygen, was later tachycardia with soft blood pressures. · GCS 15. Oriented to self, place but not time or situation. Patient is nonverbal for the most part, just nods to questions. · Labs significant for sodium of 129, potassium 2.9, AST 75, , alk phos 119. · Lactic acid 2.1. Normal WBC. Pro-Scott negative. UA negative. · Physical exam findings, significant for wheezing in bilateral lung fields, prominent in the upper decreased air movement. Unable to take deep breaths, rapid intermittent shallow breaths. 2+ bilateral lower extremity edema. · ED course: 1 L bolus of normal saline, 20 mEq of potassium. · Source unclear, suspect most likely viral respiratory illness versus bacterial (Pro-Scott negative). · Plan:  · Started patient on gentle fluids due to mildly elevated lactate. No indication to start on antibiotics at the moment. · Follow-up on blood cultures. · Monitor/trend lactic acid. · Monitor CMP, CBC in the AM.  · Albuterol as needed. · Started patient on BiPAP.

## 2023-07-29 NOTE — PLAN OF CARE
Problem: SLP ADULT - SWALLOWING, IMPAIRED  Goal: Advance to least restrictive diet without signs or symptoms of aspiration for planned discharge setting. See evaluation for individualized goals. Description: Patient will:    1. Safely swallow regular solids and thin liquids without overt signs of aspiration 100% of time. 1 day.     Outcome: Adequate for Discharge     Problem: SLP ADULT - SWALLOWING, IMPAIRED  Goal: Initial SLP swallow eval performed  Outcome: Completed

## 2023-07-29 NOTE — ASSESSMENT & PLAN NOTE
· Sodium 128 on admission. · Etiology unclear, could be due to acute on chronic CHF, possible torsemide use, or SIADH secondary to psych meds (antipsychotics, SSRIs). Appears volume overloaded on exam, suspect hypervolemic hyonatremia d/t CHF.     · Plan:  · Hold additional IVF  · Resume home torsemide  · Monitor Na, most recent 131

## 2023-07-29 NOTE — ASSESSMENT & PLAN NOTE
· Temperature POA 90.3  · Of note: Temperatures were initially taken by Sheppard probe (possible falsely low measurements due to misplaced probe or malfunctioning). Later, switched to oral temperature after discontinuing bear hugger. · Resolved.

## 2023-07-29 NOTE — ASSESSMENT & PLAN NOTE
· Potassium 2.8, Magnesium 1.8 on admission  · Patient was prescribed 10 mEq potassium daily outpatient but unsure about compliance.      · Plan  · Monitor BMP, Mg and replace as needed

## 2023-07-29 NOTE — ASSESSMENT & PLAN NOTE
· Patient's chemotherapy regimen cisplatin, etoposide (3/20/2023 - 5/26/2023), discontinued due to side effects (double vision, weakness on the right side, labile affect), duration of therapy unknown. · Positive family history of cancer in his father who had lung and bone cancer. · Plan:  · Follow-up with heme-onc outpatient.

## 2023-07-29 NOTE — SPEECH THERAPY NOTE
Speech Language Pathology    Speech Language Pathology Bedside Swallow Evaluation      Patient Name: Fidelina Stafford  OPQYL'I Date: 7/29/2023    Recommend:  Regular consistency diet and Thin liquids  Medications whole in applesauce or with fluids  Standard aspiration and reflux precautions    Discussed with patient, his significant other, CNAs and RN re: ST findings and recommendations. There are no further skilled ST needs at this time. Problem List  Principal Problem:    SIRS (systemic inflammatory response syndrome) (HCC)  Active Problems:    Fall    Hyponatremia    Hypokalemia    Bipolar I disorder (HCC)    Hypothermia    (HFpEF) heart failure with preserved ejection fraction (HCC)    Testicular cancer ([address])    Obesity hypoventilation syndrome ([address])    Past Medical History  History reviewed. No pertinent past medical history. Past Surgical History  History reviewed. No pertinent surgical history. 07/29/23 1440   Patient Information   Current Medical 41year-old male admitted secondary to altered mental status following a fall w/ head strike at home. Special Studies CTH negative for acute injuries   Past Medical History Bipolar disorder, testicular CA, and as outlined above   Social History Patient resides at home with family. Swallow Information   Current Risks for Dysphagia & Aspiration Mental status change   Current Diet Regular; Thin liquids   Baseline Diet Regular; Thin liquids   Baseline Assessment   Behavior/Cognition Alert; Cooperative  Non-verbal   Speech/Language Status Non-verbal; nods head yes/no in response to questions.    Patient Positioning Upright in bed   Swallow Mechanism Exam   Labial Symmetry WFL   Labial Strength WFL   Labial ROM WFL   Lingual Strength WFL   Lingual ROM WFL   Mandible WFL   Dentition Adequate   Volitional Cough Strong; Congested at baseline   Consistencies Assessed and Performance   Materials Administered Puree/Level 1; Mechanical Soft/Level 2; Soft/Level 3; Regular/Solid; Thin liquids   Oral Stage WFL   Pharyngeal Stage WFL   Pharyngeal Stage Comment No clinical overt signs or symptoms of aspiration evident following all swallows. Swallow Mechanics WFL   Esophageal Concerns No s/s reported   Summary   Swallow Summary Oral and pharyngeal swallowing skills are safe/ [facility] for regular solids and thin/ all liquids. Recommendations   Risk for Aspiration None   Recommendations Continue oral diet   Diet Solid Recommendation Regular consistency   Diet Liquid Recommendation Thin liquids   Recommended Form of Medications Whole with puree or liquid, one at a time   General Precautions Aspiration precautions; Feed only when alert; Upright as possible for all oral intakes; Remain upright for 45 minutes after meals   Compensatory Swallowing Strategies Alternate solids and liquids;  External pacing   Further Evaluations Dietitian   Results Reviewed with RN, CNAs, patient and family   Treatment Recommendations   Duration of treatment N/A   Follow up treatments   None currently   Dysphagia Goals   [facility] 7/29   Speech Therapy Prognosis   Prognosis Good

## 2023-07-29 NOTE — ASSESSMENT & PLAN NOTE
· Patient denies snoring, PND. · VBG results: Respiratory acidosis with metabolic compensation. Normal pH. · Suspect could be due to obesity hypoventilation syndrome: BMI 42.78 or undiagnosed CROW     · Plan:  · Patient weaned off BiPAP this morning. · Encourage patient to get sleep study outpatient.

## 2023-07-29 NOTE — ASSESSMENT & PLAN NOTE
· Home regimen Seroquel 25 mg and fluoxetine 10 mg daily. Patient reports being compliant with medication but girlfriend disagrees. · Plan:  · Resume home medications. · Encourage patient to be compliant with pharmacotherapy outpatient.

## 2023-07-29 NOTE — ASSESSMENT & PLAN NOTE
· Patient's chemotherapy regimen cisplatin, etoposide (3/20/2023 - 5/26/2023), discontinued due to side effects (double vision, weakness on the right side, labile affect), duration of therapy unknown. · MRI brain IAC w wo contrast 3/15/2023: 3 mm focus of enhancement in the lateral aspect of the left IAC, possibly a mass lesion such as a schwannoma versus inflammation. Patient is being followed by neurology outpatient. · Plan:  · Follow-up with heme-onc outpatient.

## 2023-07-29 NOTE — ASSESSMENT & PLAN NOTE
· Patient's BiPAP parameters had to be adjusted due to tachypnea overnight. Suspect could be due to undiagnosed CROW or obesity hypoventilation syndrome: BMI 42.78  · Plan:  · Encourage patient to get sleep study outpatient.

## 2023-07-29 NOTE — ASSESSMENT & PLAN NOTE
· Met 2 out of 4 SIRS criteria initially (hypothermia, tachypnea), saturating mid 90s on 3 L of oxygen, was later tachycardia with soft blood pressures. · GCS 15. Oriented to self, place but not time or situation. Patient is nonverbal for the most part, just nods to questions. · Labs significant for sodium of 129, potassium 2.9, mildly elevated liver enzymes. · Lactic acid 2.1 --> 2.0. Normal WBC. Pro-Scott negative. UA negative. COVID-negative. · Physical exam findings, significant for wheezing in bilateral lung fields, prominent in the upper with decreased air movement. Unable to take deep breaths, rapid intermittent shallow breaths. 1+ bilateral lower extremity edema. · ED course: 1 L bolus of normal saline, 20 mEq of potassium. · Source unclear, suspect most likely viral respiratory illness versus bacterial (Pro-Scott negative). · Large periumbilical hernia noted on exam, tender to palpation    · Plan:  · Hold additional IVF  · Monitor off antibiotics absent a source. · CT C/A/P w con ordered to evaluate for source of occult infection--f/u results  · Trend WBC, fever  · Follow-up on blood cultures. · Monitor CMP, CBC in the AM.  · Albuterol as needed. · At admission, patient started on BiPAP due to increased work of breathing.  Transitioned off BiPAP w RT this morning

## 2023-07-29 NOTE — ASSESSMENT & PLAN NOTE
- Patient more somnolent, speaking minimally  - GCS 15, patient responds to voice appropriately and is oriented to person, place, time  - CT head without acute changes  - iSTAT showing hypokalemia, hyponatremia  - Labs ordered and general medicine consulted, plan to admit to medicine service for further workup at this time

## 2023-07-29 NOTE — ASSESSMENT & PLAN NOTE
· Sodium 128 on admission. · Unable to assess the patient symptomatic or not. · Etiology unclear, could be due to acute on chronic CHF, possible torsemide use. · Plan:  · Started patient on normal saline. · Monitor/trend CMP in the a.m.

## 2023-07-29 NOTE — H&P
[facility]  H&P  Name: Félix Chong 41 y.o. male I MRN: [id number]  Unit/Bed#: S -01 I Date of Admission: 7/28/2023   Date of Service: 7/29/2023 I Hospital Day: 1        Assessment/Plan   * SIRS (systemic inflammatory response syndrome) (Formerly [facility])  Assessment & Plan  · Met 2 out of 4 SIRS criteria initially (hypothermia, tachypnea), saturating mid 90s on 3 L of oxygen, was later tachycardia with soft blood pressures. · GCS 15. Oriented to self, place but not time or situation. Patient is nonverbal for the most part, just nods to questions. · Labs significant for sodium of 129, potassium 2.9, mildly elevated liver enzymes. · Lactic acid 2.1 - 2.0. Normal WBC. Pro-Scott negative. UA negative. COVID-negative. · Physical exam findings, significant for wheezing in bilateral lung fields, prominent in the upper with decreased air movement. Unable to take deep breaths, rapid intermittent shallow breaths. 1+ bilateral lower extremity edema. · ED course: 1 L bolus of normal saline, 20 mEq of potassium. · Source unclear, suspect most likely viral respiratory illness versus bacterial (Pro-Scott negative). · Plan:  · Started patient on gentle fluids due to mildly elevated lactate. No indication to start on antibiotics at the moment. · Follow-up on blood cultures. · Monitor CMP, CBC in the AM.  · Albuterol as needed. · Started patient on BiPAP due to increased work of breathing. · Recommend getting a repeat CXR due to poor image quality on the previous one. (HFpEF) heart failure with preserved ejection fraction (HCC)  Assessment & Plan  · Weight 140 kg at cardiology office visit 7/19/2023. Today 159 kg. · Vital signs hypothermic, tachypneic, saturating mid 90s on 3 L of oxygen, later became tachypneic. Currently, hypotensive and 90s/60s. · BNP 10. Chest x-ray wet read did not notice significant vascular congestion or pleural effusions. Wait on formal read.   · Echo from 6/2/2023 revealed EF of 65%. No wall motion abnormalities. · CHF exacerbation less likely based on clinical presentation (no crackles or rales heard on exam, unremarkable BMP, CXR). There is a 19 kg weight increase in 10 days but would expect patient to present with severe symptoms of fluid overload. Suspect discrepancies in weight due to different scales. · Plan:  · Home regimen: Torsemide 20 mg daily, potassium 10 mg daily. · Resume home regimen as blood pressure stabilizes. · Recommend getting standing weights. · Encourage patient to be compliant with pharmacotherapy outpatient as prescribed. Hyponatremia  Assessment & Plan  · Sodium 128 on admission. · Etiology unclear, could be due to acute on chronic CHF, possible torsemide use, or SIADH secondary to psych meds (antipsychotics, SSRIs). · Plan:  · Started patient on normal saline. · Monitor CMP in the a.m. Hypokalemia  Assessment & Plan  · Potassium 2.8, Magnesium 1.8. · Patient was prescribed 10 mEq potassium daily outpatient but unsure about compliance. · Plan  · In total patient received (20 mEq at the ED + 40 mEq on the floors = 60)   · Magnesium repleted. · Monitor CMP in the a.m. Obesity hypoventilation syndrome ([address])  Assessment & Plan  · Patient denies snoring, PND. · VBG results: Respiratory acidosis with metabolic compensation. Normal pH. · Suspect could be due to obesity hypoventilation syndrome: BMI 42.78 or undiagnosed CROW   · Plan:  · Patient started on BiPAP due to increased work of breathing. · Encourage patient to get sleep study outpatient. Bipolar I disorder (HCC)  Assessment & Plan  · Home regimen Seroquel 25 mg and fluoxetine 10 mg daily. Patient reports being compliant with medication but girlfriend disagrees. · Plan:  · Resume home medications. · Encourage patient to be compliant with pharmacotherapy outpatient.     Hypothermia  Assessment & Plan  · Temperature POA 90.3  · Of note: Temperatures were initially taken by Sheppard probe (possible falsely low measurements due to misplaced probe or malfunctioning). Later, switched to oral temperature after discontinuing bear hugger. · Resolved. Fall  Assessment & Plan  · Patient is a poor historian, etiology/mechanism of fall unclear. · Trauma work-up at the ED was unremarkable for any acute traumatic injuries. · MRI brain auditory canal w wo contrast 3/15/2023: 3 mm focus of enhancement in the lateral aspect of the left IAC, possibly a mass lesion such as a schwannoma versus inflammation. Patient is being followed by neurosurgery outpatient. · Plan:  · Per Neurosurgery, serial monitoring was recommended instead of radiation or surgery   · Follow-up with neurosurgery outpatient. Testicular cancer [facility])  Assessment & Plan  · Patient's chemotherapy regimen cisplatin, etoposide (3/20/2023 - 5/26/2023), discontinued due to side effects (double vision, weakness on the right side, labile affect), duration of therapy unknown. · Positive family history of cancer in his father who had lung and bone cancer. · Plan:  · PT/OT consult placed, appreciate recommendations. · Follow-up with heme-onc outpatient. VTE Pharmacologic Prophylaxis: VTE Score: 6 High Risk (Score >/= 5) - Pharmacological DVT Prophylaxis Ordered: enoxaparin (Lovenox). Sequential Compression Devices Ordered. Code Status: Level 1 - Full Code   Discussion with family: Updated  (significant other and mother) via phone. Anticipated Length of Stay: Patient will be admitted on an observation basis with an anticipated length of stay of less than 2 midnights secondary to sepsis. Chief Complaint:     I had a fall.      History of Present Illness:  Bryan Carballo is a 41 y.o. male with a PMH of testicle cancer status post left orchiectomy (chemotherapy), bipolar 1, HFpEF, hypertension who presents with change in mental status, decreased activity per family after a fall Tuesday night. He lost his balance and hit his head on the nightstand and scraped up his knee. He refused to go to the hospital and had to be convinced today by his older brother. Unable to obtain any further information regarding the fall from the patient or girlfriend. She did mention that he was losing his balance a lot for the past month, leaning to the right side. She mentioned decreased p.o. intake and decreased activity since the fall. She mentioned that he is noncompliant with his medications. He denies any physical pain, headaches, chest pain, abdominal pain, nausea, vomiting, diarrhea, constipation, urinary frequency. Patient endorses to smoking cigarettes and marijuana. Patient is mostly nonverbal. History obtained from girlfriend ([phone]) and mom. He has 3 children's. Per cardiology report, patient quit heavy drinking and meth about 3 yrs ago. He continues to smoke but unable to tell how many packs. Review of Systems:  Review of Systems   Constitutional: Positive for activity change. Negative for chills and fever. HENT: Negative for congestion and sore throat. Respiratory: Positive for shortness of breath. Negative for cough. Cardiovascular: Negative for chest pain and palpitations. Gastrointestinal: Negative for abdominal pain, nausea and vomiting. Genitourinary: Negative for dysuria. Musculoskeletal: Negative for arthralgias. Neurological: Negative for dizziness and headaches. Past Medical and Surgical History:   History reviewed. No pertinent past medical history. History reviewed. No pertinent surgical history. Meds/Allergies:  Prior to Admission medications    Not on File     I have reviewed home medications with patient family member.     Allergies: No Known Allergies    Social History:  Marital Status: Single   Occupation:   Patient Pre-hospital Living Situation: Home  Patient Pre-hospital Level of Mobility: walks  Patient Pre-hospital Diet Restrictions: Substance Use History:   Social History     Substance and Sexual Activity   Alcohol Use None     Social History     Tobacco Use   Smoking Status Not on file   Smokeless Tobacco Not on file     Social History     Substance and Sexual Activity   Drug Use Not on file       Family History:  History reviewed. No pertinent family history. Physical Exam:     Vitals:   Blood Pressure: 99/63 (07/29/23 0153)  Pulse: 77 (07/29/23 0435)  Temperature: 97.6 °F (36.4 °C) (07/29/23 0435)  Temp Source: Probe (Temp puga) (07/28/23 2252)  Respirations: 16 (07/29/23 0153)  Height: 6' 4" (193 cm) (07/28/23 2330)  Weight - Scale: (!) 159 kg (351 lb 6.6 oz) (07/28/23 1814)  SpO2: 97 % (07/29/23 0435)    Physical Exam  Vitals and nursing note reviewed. Constitutional:       General: He is not in acute distress. Appearance: He is obese. HENT:      Head: Normocephalic and atraumatic. Mouth/Throat:      Mouth: Mucous membranes are moist.      Pharynx: Oropharynx is clear. Eyes:      Extraocular Movements: Extraocular movements intact. Pupils: Pupils are equal, round, and reactive to light. Cardiovascular:      Rate and Rhythm: Normal rate and regular rhythm. Pulses: Normal pulses. Pulmonary:      Comments: Intermittent rapid shallow breaths. Decreased air movement bilaterally. Wheezing in bilateral lungs more prominent on the upper. Abdominal:      General: Bowel sounds are normal.      Palpations: Abdomen is soft. Hernia: A hernia (Umbilical, nontender) is present. Musculoskeletal:      Right lower leg: Edema (1+) present. Left lower leg: Edema (1+) present. Skin:     General: Skin is warm and dry. Capillary Refill: Capillary refill takes less than 2 seconds. Neurological:      General: No focal deficit present. Comments: Oriented to self, place but not time or situation.           Additional Data:     Lab Results:  Results from last 7 days   Lab Units 07/29/23  0433 07/28/23  8671 WBC Thousand/uL 5.78 5.17   HEMOGLOBIN g/dL 12.2 13.7   HEMATOCRIT % 36.1* 39.8   PLATELETS Thousands/uL 210 228   NEUTROS PCT %  --  74   LYMPHS PCT %  --  16   MONOS PCT %  --  8   EOS PCT %  --  1     Results from last 7 days   Lab Units 07/28/23  2314   SODIUM mmol/L 131*   POTASSIUM mmol/L 3.1*   CHLORIDE mmol/L 87*   CO2 mmol/L 34*   BUN mg/dL 10   CREATININE mg/dL 0.70   ANION GAP mmol/L 10   CALCIUM mg/dL 8.8   ALBUMIN g/dL 3.5   TOTAL BILIRUBIN mg/dL 0.37   ALK PHOS U/L 119*   ALT U/L 155*   AST U/L 75*   GLUCOSE RANDOM mg/dL 81                 Results from last 7 days   Lab Units 07/29/23  0155 07/28/23  2314 07/28/23  2101   LACTIC ACID mmol/L 2.0 2.1*  --    PROCALCITONIN ng/ml  --   --  0.09       Lines/Drains:  Invasive Devices     Peripheral Intravenous Line  Duration           Peripheral IV 07/28/23 Left Hand <1 day    Peripheral IV 07/28/23 Right Antecubital <1 day          Drain  Duration           Urethral Catheter 16 Fr. <1 day                    Imaging: Reviewed radiology reports from this admission including: chest xray and chest CT scan  TRAUMA - CT head wo contrast   Final Result by Carol Macias MD (07/28 1902)      No acute intracranial pathology. In particular, no intracranial hemorrhage or calvarial fracture. Slight decreased attenuation in the periventricular and subcortical white matter as on prior studies, better delineated on recent brain MRI. I personally discussed this study with Dr. Erick Brewer on 7/28/2023 at 7:02 PM.                  Workstation performed: [id number]         XR Trauma multiple ([facility] trauma bay ONLY)   Final Result by Carol Macias MD (07/28 1903)      Hypoinflated lungs, limiting evaluation. Likely atelectasis at the lung bases. No evidence of pneumothorax. No obvious displaced fractures within limitation of supine AP chest technique.                Workstation performed: [id number]         XR chest 1 view    (Results Pending)       EKG and Other Studies Reviewed on Admission:   · EKG: No EKG obtained. ** Please Note: This note has been constructed using a voice recognition system.  **

## 2023-07-29 NOTE — ASSESSMENT & PLAN NOTE
· Weight 140 kg at cardiology office visit 7/19/2023. · Vital signs hypothermic, tachypneic, saturating mid 90s on 3 L of oxygen, later became tachypneic. · Chest x-ray wet read did not notice significant vascular congestion or pleural effusions. Wait on formal read. · Echo from 6/2/2023 revealed EF of 65%. · CHF exacerbation less likely based on clinical presentation (no crackles or rales heard on exam, unremarkable BMP, CXR). There is a 19 kg weight increase in 10 days but would expect patient to present differently, suspect discrepancies in weight due to different scales. · Plan:  · Home regimen: Torsemide 20 mg daily, potassium 10 mg daily. · Resume home regimen as blood pressure stabilizes. · Follow-up on BNP (15 on 6/19/2023). · Recommend getting standing weights. · Encourage patient to be compliant with prescribed pharmacotherapy outpatient.

## 2023-07-29 NOTE — ASSESSMENT & PLAN NOTE
· Met 2 out of 4 SIRS criteria (hypothermia, tachypnea), without any source of infection. · GCS 15. Patient is nonverbal for the most part. History obtained from girlfriend and mother. · BMP revealed sodium of 129, potassium 2.9.  · Suspect AMS due to electrolyte imbalance.

## 2023-07-29 NOTE — ASSESSMENT & PLAN NOTE
· Potassium 2.8, Magnesium 1.8. · Patient was been prescribed 10 mEq potassium daily outpatient but unsure about compliance. · Plan  · In total patient received (20 mEq at the ED + 40 mEq on the floors = 60)   · Magnesium repleted. · Monitor CMP in the a.m.

## 2023-07-29 NOTE — ASSESSMENT & PLAN NOTE
· Weight 140 kg at cardiology office visit 7/19/2023. Today 148 kg. · Vital signs hypothermic, tachypneic, saturating mid 90s on 3 L of oxygen, later became tachypneic. Currently, hypotensive and 90s/60s. · BNP 10. Chest x-ray did not show significant vascular congestion or pleural effusions. · Echo from 6/2/2023 revealed EF of 65%. No wall motion abnormalities. · 2-3+ pitting edema in the b/l LEs to upper shin    · Plan:  · Resume home regimen: Torsemide 20 mg daily, potassium 10 mg daily. · Recommend standing weights. · Encourage patient to be compliant with pharmacotherapy outpatient as prescribed.

## 2023-07-29 NOTE — UTILIZATION REVIEW
Initial Clinical Review    Admission: Date/Time/Statement:   Admission Orders (From admission, onward)     Ordered        07/28/23 1795 Dr Golden Rai  Once                      Orders Placed This Encounter   Procedures   • INPATIENT ADMISSION     Standing Status:   Standing     Number of Occurrences:   1     Order Specific Question:   Level of Care     Answer:   Med Surg [16]     Order Specific Question:   Estimated length of stay     Answer:   More than 2 Midnights     Order Specific Question:   Certification     Answer:   I certify that inpatient services are medically necessary for this patient for a duration of greater than two midnights. See H&P and MD Progress Notes for additional information about the patient's course of treatment. ED Arrival Information     Expected   -    Arrival   7/28/2023 18:13    Acuity   Emergent            Means of arrival   Ambulance    Escorted by   [facility]    Service   Hospitalist    Admission type   Emergency            Arrival complaint   -           Chief Complaint   Patient presents with   • Altered Mental Status     Patient fell on Wednesday. Initial Presentation: 41 y.o. male PMH of testicle cancer status post left orchiectomy (chemotherapyd/c'ed 2/2 SE), bipolar 1, HFpEF-65 % on echo 6/2023, HTN , tobacco use, marijuana use, obesity, OHS, deafness who presents to ED from home via EMS with change in mental status, decreased activity per family after a fall Tuesday night. He lost his balance and hit his head on the nightstand and scraped up his knee-refused to go to hospital at that time. Per pts girlfriend, pt  was losing his balance a lot for the past month, leaning to the right side. Decreased p.o. intake and decreased activity since the fall. She states pt is noncompliant with his medications.  Pt is mostly non verbal on exam, hypothermic 90.3 F(via puga probe-likely malfunctioning )Oriented to self, place but not time or situation Intermittent rapid shallow breaths., increased work of breathing . Saturating mid 90s on 3 L of oxygen,  Decreased air movement bilaterally. Wheezing in bilateral lungs more prominent on the upper. Hypotensive . Has 1 + BLE edema . Weight 140 kg at cardiology office visit 7/19/2023. Today 159 kg-possibly 2/2 different scales. Has umbilical hernia, nontender and soft . GCS 15 on arrival, was 10 per EMS Labs , K 2.9,Mag 1.8 elevated LFT's , LA 2.1. VBG results: Respiratory acidosis with metabolic compensation. Normal pH. CT head shows nothing acute . No traumatic injuries on workup . CXR shows likely atelectasis at lung bases. Pt given IVF, K repletion in ED. Pt started on Bi PAP in ED. Pt admitted  Inpatient to telemetry, level 2 SD with SIRS,OHS,  s/p fall, hypokalemia, hyponatremia. PLan -telemetry,  gentle IVF-NSS, F/U blood cx , Monitor CBC, CMP. prn Albuterol , Bi PAP. Obtain repeat CXR. Replete lytes as needed . PT/OT. Date: 7/29  Day 2:    Pt transitioned off Bi PaP this am with RT . Normal breath sounds . Pt endorses SOB . Has +2 to 3+ pitting edema in the b/l LEs to upper shin. IVF d/c'ed today Telemetry shows NSR in 80s at present, 2 episodes of bradycardia captured. 4+ strength in the bilateral upper extremities. Large periumbilical hernia is tender to palpation. GCS 15. Obtain CT C/A/P to evaluate for source of occult infection. Trend WBC, fever curve . Monitor off abx . Monitor lytes and replete . Suspect hyponatremia 2/2 psych meds ,Sodium 131 today, K 3.2 . BP in low90's this am .Appears volume overloaded on exam, suspect hypervolemic hyonatremia d/t CHF. Continue home torsemide . On exam, pt nonverbal but engaged with exam, in no acute distress. Nods or shakes his head in response to questions. GCS 15. SLP-bedside swallow eval- recommend reg diet w./ thin liquids .        ED Triage Vitals   Temperature Pulse Respirations Blood Pressure SpO2   07/28/23 1814 07/28/23 1814 07/28/23 1814 07/28/23 1814 07/28/23 1814   (!) 90.3 °F (32.4 °C) 61 20 138/62 94 %      Temp Source Heart Rate Source Patient Position - Orthostatic VS BP Location FiO2 (%)   07/28/23 1814 07/28/23 1814 07/28/23 1814 -- --   Rectal Monitor Lying        Pain Score       07/28/23 2330       No Pain          Wt Readings from Last 1 Encounters:   07/29/23 (!) 148 kg (325 lb 9.9 oz)     Additional Vital Signs:   /Time Temp Pulse Resp BP MAP (mmHg) SpO2 Calculated FIO2 (%) - Nasal Cannula Nasal Cannula O2 Flow Rate (L/min) O2 Device O2 Interface Device   07/29/23 11:21:24 -- 93 -- 106/69 81 92 % -- -- -- --   07/29/23 1119 98.2 °F (36.8 °C) -- -- -- -- -- -- -- -- --   07/29/23 0830 -- -- -- -- -- 95 % -- -- -- Face mask   07/29/23 0822 97 °F (36.1 °C) Abnormal  -- -- -- -- -- -- -- -- --   07/29/23 0741 96.8 °F (36 °C) Abnormal  76 -- -- -- 96 % -- -- BiPAP --   07/29/23 06:54:07 96.6 °F (35.9 °C) Abnormal  77 18 94/61 72 96 % -- -- -- --   07/29/23 04:35:21 97.6 °F (36.4 °C) 77 -- -- -- 97 % -- -- -- --   07/29/23 0300 -- -- -- -- -- 98 % -- -- -- Face mask   07/29/23 01:53:23 97.5 °F (36.4 °C) 80 16 99/63 75 98 % -- -- -- --   07/29/23 0103 -- -- -- -- -- 98 % -- -- -- Face mask   07/29/23 00:18:36 97.6 °F (36.4 °C) 87 -- -- -- 95 % -- -- -- --   07/28/23 2319 96.4 °F (35.8 °C) Abnormal   -- -- 90/60 -- -- -- -- -- --   07/28/23 23:09:38 -- 81 -- 93/57 69 96 % -- -- -- --   07/28/23 2302 -- -- -- -- -- 93 % 40 5 L/min Nasal cannula --   07/28/23 22:52:28 96.3 °F (35.7 °C) Abnormal  91 -- 108/58 75 93 % 32 3 L/min Nasal cannula --   07/28/23 2130 94.1 °F (34.5 °C) Abnormal  76 -- 116/60 82 95 % -- -- -- --   07/28/23 2110 93.6 °F (34.2 °C) Abnormal  73 20 109/55 -- 93 % -- -- -- --   07/28/23 2100 93.2 °F (34 °C) Abnormal  73 20 116/57 -- 94 % -- -- -- --   07/28/23 2040 91.6 °F (33.1 °C) Abnormal  71 20 114/70 -- 94 % -- -- -- --   07/28/23 2030 -- 70 20 113/70 -- 94 % -- -- -- --   07/28/23 2000 -- 67 22 96/57 -- 94 % -- -- Nasal cannula --   07/28/23 1950 -- -- 22 -- -- -- -- -- -- --   07/28/23 1945 -- 67 22 99/54 -- 95 % -- -- Nasal cannula --   07/28/23 1940 -- -- 22 -- -- -- -- -- -- --   07/28/23 1930 91.1 °F (32.8 °C) Abnormal   66 22 105/60 -- 95 % -- -- Nasal cannula --   07/28/23 1915 -- 67 20 91/61 -- 94 % -- -- Nasal cannula --   07/28/23 1900 -- 62 24 Abnormal  104/66 81 94 % -- -- Nasal cannula --   07/28/23 1845 -- 62 24 Abnormal  121/72 -- 94 % -- -- Nasal cannula --   07/28/23 18:44:18 -- -- 24 Abnormal  128/71 -- 95 % -- -- Nasal cannula --   07/28/23 18:24:28 -- -- 20 120/93 -- 93 % -- -- None (Room air) --     Date and Time Eye Opening Best Verbal Response Best Motor Response Independence Coma Scale Score   07/29/23 0741 3 4 6 13   07/28/23 2330 4 5 6 15   07/28/23 2100 4 5 6 15   07/28/23 2040 4 5 6 15   07/28/23 2030 4 5 6 15   07/28/23 2000 3 5 6 14   07/28/23 1945 3 5 6 14   07/28/23 1930 3 5 6 14   07/28/23 1915 3 5 6 14   07/28/23 1900 3 5 6 14   07/28/23 1845 4 5 6 15   07/28/23 1844 4 5 6 15   07/28/23 1824 4 5 6 15   07/28/23 1814 4 5 6 15           Pertinent Labs/Diagnostic Test Results:   TRAUMA - CT head wo contrast   Final Result by Adrián Gamble MD (07/28 1902)      No acute intracranial pathology. In particular, no intracranial hemorrhage or calvarial fracture. Slight decreased attenuation in the periventricular and subcortical white matter as on prior studies, better delineated on recent brain MRI. I personally discussed this study with Dr. Luis Crane on 7/28/2023 at 7:02 PM.                  Workstation performed: [id number]         XR Trauma multiple (SLB/[facility]RA trauma bay ONLY)   Final Result by Adrián Gamble MD (07/28 1903)      Hypoinflated lungs, limiting evaluation. Likely atelectasis at the lung bases. No evidence of pneumothorax. No obvious displaced fractures within limitation of supine AP chest technique.                Workstation performed: [id number]            CT chest abdomen pelvis w contrast    (7/29/23)  1. Low lung volumes with bilateral lower lobe atelectasis versus infiltrates. Image degradation due to respiratory motion. 2. Severe hepatic steatosis. 3. Fat-containing umbilical hernia with no complications. 4. Retroperitoneal nodes, 1 stable and one smaller. Attention is needed on follow-up exam in view of patient's history of testicular neoplasm.          Results from last 7 days   Lab Units 07/29/23  0049   SARS-COV-2  Negative     Results from last 7 days   Lab Units 07/29/23 0433 07/28/23  1855 07/28/23  1820   WBC Thousand/uL 5.78 5.17  --    HEMOGLOBIN g/dL 12.2 13.7  --    I STAT HEMOGLOBIN g/dl  --   --  14.3   HEMATOCRIT % 36.1* 39.8  --    HEMATOCRIT, ISTAT %  --   --  42   PLATELETS Thousands/uL 210 228  --    NEUTROS ABS Thousands/µL  --  3.87  --          Results from last 7 days   Lab Units 07/29/23 0433 07/28/23 2314 07/28/23 2101 07/28/23  1855 07/28/23  1820   SODIUM mmol/L 131* 131*  --  129*  --    POTASSIUM mmol/L 3.2* 3.1*  --  2.8*  --    CHLORIDE mmol/L 87* 87*  --  85*  --    CO2 mmol/L 35* 34*  --  36*  --    CO2, I-STAT mmol/L  --   --   --   --  42*   ANION GAP mmol/L 9 10  --  8  --    BUN mg/dL 10 10  --  12  --    CREATININE mg/dL 0.78 0.70  --  0.74  --    EGFR ml/min/1.73sq m 116 121  --  118  --    CALCIUM mg/dL 9.0 8.8  --  9.3  --    CALCIUM, IONIZED, ISTAT mmol/L  --   --   --   --  1.19   MAGNESIUM mg/dL  --   --  1.8*  --   --      Results from last 7 days   Lab Units 07/29/23 0433 07/28/23 2314   AST U/L 77* 75*   ALT U/L 156* 155*   ALK PHOS U/L 118* 119*   TOTAL PROTEIN g/dL 6.4 6.1*   ALBUMIN g/dL 3.9 3.5   TOTAL BILIRUBIN mg/dL 0.46 0.37         Results from last 7 days   Lab Units 07/29/23  0433 07/28/23  2314 07/28/23  1855   GLUCOSE RANDOM mg/dL 91 81 86             No results found for: "BETA-HYDROXYBUTYRATE"           Results from last 7 days   Lab Units 07/28/23  1820   PH, SYLVESTER I-STAT  7.402*   PCO2, SYLVESTER ISTAT mm HG 63.7* PO2, SYLVESTER ISTAT mm HG 62.0*   HCO3, SYLVESTER ISTAT mmol/L 39.6*   I STAT BASE EXC mmol/L 12*   I STAT O2 SAT % 90*                     Results from last 7 days   Lab Units 07/28/23  1855   TSH 3RD GENERATON uIU/mL 1.318     Results from last 7 days   Lab Units 07/28/23  2101   PROCALCITONIN ng/ml 0.09     Results from last 7 days   Lab Units 07/29/23  0155 07/28/23  2314   LACTIC ACID mmol/L 2.0 2.1*             Results from last 7 days   Lab Units 07/28/23  1855   BNP pg/mL 10                                     Results from last 7 days   Lab Units 07/28/23  2336   CLARITY UA  Turbid   COLOR UA  Light Yellow   SPEC GRAV UA  1.016   PH UA  5.5   GLUCOSE UA mg/dl Negative   KETONES UA mg/dl Negative   BLOOD UA  Negative   PROTEIN UA mg/dl Negative   NITRITE UA  Negative   BILIRUBIN UA  Negative   UROBILINOGEN UA (BE) mg/dl <2.0   LEUKOCYTES UA  Negative             Results from last 7 days   Lab Units 07/28/23 2053   AMPH/METH  Negative   BARBITURATE UR  Negative   BENZODIAZEPINE UR  Negative   COCAINE UR  Negative   METHADONE URINE  Negative   OPIATE UR  Negative   PCP UR  Negative   THC UR  Positive*     Results from last 7 days   Lab Units 07/28/23  2101   ETHANOL LVL mg/dL <10                 Results from last 7 days   Lab Units 07/29/23  0047   BLOOD CULTURE  Received in Microbiology Lab. Culture in Progress. Received in Microbiology Lab. Culture in Progress. ED Treatment:   Medication Administration from 07/28/2023 1809 to 07/28/2023 2248       Date/Time Order Dose Route Action     07/28/2023 1825 EDT multi-electrolyte (ISOLYTE-S PH 7.4) bolus 1,000 mL Intravenous New Bag     07/28/2023 1952 EDT potassium chloride 20 mEq IVPB (premix) 20 mEq Intravenous New Bag     07/28/2023 2233 EDT sodium chloride 0.9 % with KCl 20 mEq/L infusion (premix) 75 mL/hr Intravenous New Bag        History reviewed. No pertinent past medical history.   Present on Admission:  **None**      Admitting Diagnosis: Hypokalemia [E87.6]  Hyponatremia [E87.1]  Altered mental status [R41.82]  Fall, initial encounter [W19. XXXA]  Hypothermia, initial encounter [T68. XXXA]  Age/Sex: 41 y.o. male  Admission Orders:  Scheduled Medications:  enoxaparin, 40 mg, Subcutaneous, Q12H [address]  FLUoxetine, 10 mg, Oral, Daily  [START ON 7/30/2023] potassium chloride, 10 mEq, Oral, Daily  QUEtiapine, 25 mg, Oral, HS  [START ON 7/30/2023] torsemide, 20 mg, Oral, Daily    potassium chloride 20 mEq IVPB (premix)  Dose: 20 mEq  Freq: Every 2 hours Route: IV  Last Dose: Stopped (07/29/23 1133)  Start: 07/29/23 0630 End: 07/29/23 1133  magnesium sulfate 2 g/50 mL IVPB (premix) 2 g  Dose: 2 g  Freq: Once Route: IV  Last Dose: 2 g (07/28/23 2333)  Start: 07/28/23 2315 End: 07/29/23 0133        Continuous IV Infusions:    sodium chloride 0.9 % with KCl 20 mEq/L infusion (premix)  Rate: 75 mL/hr Dose: 75 mL/hr  Freq: Continuous Route: IV End: 07/29/23 1302  PRN Meds:  albuterol, 2 puff, Inhalation, Q4H PRN  albuterol, 2.5 mg, Nebulization, Q4H PRN x1 7/28      Telemetry   SCD   OOB as michael w/ assist   Knee high compression stockings      Network Utilization Review Department  ATTENTION: Please call with any questions or concerns to [phone] and carefully listen to the prompts so that you are directed to the right person. All voicemails are confidential.  Jhoan Colon all requests for admission clinical reviews, approved or denied determinations and any other requests to dedicated fax number below belonging to the campus where the patient is receiving treatment.  List of dedicated fax numbers for the Facilities:  [place] DENIALS (Administrative/Medical Necessity) [phone]   [address] [facility] (Maternity/NICU/Pediatrics) [address] [place] [address] [phone]   [facility] [phone]   [employer] [address] [phone]   [facility] [address] [address] [facility] [address] [address] [address] [phone]

## 2023-07-29 NOTE — PROGRESS NOTES
[facility]  Progress Note  Name: Amanda Santiago  MRN: [id number]  Unit/Bed#: S -01 I Date of Admission: 7/28/2023   Date of Service: 7/29/2023 I Hospital Day: 1    Assessment/Plan   * SIRS (systemic inflammatory response syndrome) (HCC)  Assessment & Plan  · Met 2 out of 4 SIRS criteria initially (hypothermia, tachypnea), saturating mid 90s on 3 L of oxygen, was later tachycardia with soft blood pressures. · GCS 15. Oriented to self, place but not time or situation. Patient is nonverbal for the most part, just nods to questions. · Labs significant for sodium of 129, potassium 2.9, mildly elevated liver enzymes. · Lactic acid 2.1 --> 2.0. Normal WBC. Pro-Scott negative. UA negative. COVID-negative. · Physical exam findings, significant for wheezing in bilateral lung fields, prominent in the upper with decreased air movement. Unable to take deep breaths, rapid intermittent shallow breaths. 1+ bilateral lower extremity edema. · ED course: 1 L bolus of normal saline, 20 mEq of potassium. · Source unclear, suspect most likely viral respiratory illness versus bacterial (Pro-Scott negative). · Large periumbilical hernia noted on exam, tender to palpation    · Plan:  · Hold additional IVF  · Monitor off antibiotics absent a source. · CT C/A/P w con ordered to evaluate for source of occult infection--f/u results  · Trend WBC, fever  · Follow-up on blood cultures. · Monitor CMP, CBC in the AM.  · Albuterol as needed. · At admission, patient started on BiPAP due to increased work of breathing. Transitioned off BiPAP w RT this morning    Hypothermia  Assessment & Plan  · Temperature POA 90.3  · Of note: Temperatures were initially taken by Sheppard probe (possible falsely low measurements due to misplaced probe or malfunctioning). Later, switched to oral temperature after discontinuing bear hugger. · Resolved.       Hypokalemia  Assessment & Plan  · Potassium 2.8, Magnesium 1.8 on admission  · Patient was prescribed 10 mEq potassium daily outpatient but unsure about compliance. · Plan  · Monitor BMP, Mg and replace as needed    (HFpEF) heart failure with preserved ejection fraction (HCC)  Assessment & Plan  · Weight 140 kg at cardiology office visit 7/19/2023. Today 148 kg. · Vital signs hypothermic, tachypneic, saturating mid 90s on 3 L of oxygen, later became tachypneic. Currently, hypotensive and 90s/60s. · BNP 10. Chest x-ray did not show significant vascular congestion or pleural effusions. · Echo from 6/2/2023 revealed EF of 65%. No wall motion abnormalities. · 2-3+ pitting edema in the b/l LEs to upper shin    · Plan:  · Resume home regimen: Torsemide 20 mg daily, potassium 10 mg daily. · Recommend standing weights. · Encourage patient to be compliant with pharmacotherapy outpatient as prescribed. Obesity hypoventilation syndrome ([address])  Assessment & Plan  · Patient denies snoring, PND. · VBG results: Respiratory acidosis with metabolic compensation. Normal pH. · Suspect could be due to obesity hypoventilation syndrome: BMI 42.78 or undiagnosed CROW     · Plan:  · Patient weaned off BiPAP this morning. · Encourage patient to get sleep study outpatient. Fall  Assessment & Plan  · Patient is a poor historian, etiology/mechanism of fall unclear. · Trauma work-up at the ED was unremarkable for any acute traumatic injuries. · MRI brain auditory canal w wo contrast 3/15/2023: 3 mm focus of enhancement in the lateral aspect of the left IAC, possibly a mass lesion such as a schwannoma versus inflammation. Patient is being followed by neurosurgery outpatient. · Plan:  · Per Neurosurgery, serial monitoring was recommended instead of radiation or surgery   · Follow-up with neurosurgery outpatient. · PT/OT    Hyponatremia  Assessment & Plan  · Sodium 128 on admission.   · Etiology unclear, could be due to acute on chronic CHF, possible torsemide use, or SIADH secondary to psych meds (antipsychotics, SSRIs). Appears volume overloaded on exam, suspect hypervolemic hyonatremia d/t CHF. · Plan:  · Hold additional IVF  · Resume home torsemide  · Monitor Na, most recent 131    Testicular cancer ([address])  Assessment & Plan  · Patient's chemotherapy regimen cisplatin, etoposide (3/20/2023 - 2023), discontinued due to side effects (double vision, weakness on the right side, labile affect), duration of therapy unknown. · Positive family history of cancer in his father who had lung and bone cancer. · Plan:  · Follow-up with heme-onc outpatient. Bipolar I disorder (HCC)  Assessment & Plan  · Home regimen Seroquel 25 mg and fluoxetine 10 mg daily. Patient reports being compliant with medication but girlfriend disagrees. · Plan:  · Resume home medications. · Encourage patient to be compliant with pharmacotherapy outpatient. VTE Pharmacologic Prophylaxis: VTE Score: 6 High Risk (Score >/= 5) - Pharmacological DVT Prophylaxis Ordered: enoxaparin (Lovenox). Sequential Compression Devices Ordered. Patient Centered Rounds: I performed bedside rounds with nursing staff today. Discussions with Specialists or Other Care Team Provider: None    Education and Discussions with Family / Patient: Will call contact to update. Current Length of Stay: 1 day(s)  Current Patient Status: Inpatient   Discharge Plan: Anticipate discharge in 48-72 hrs to discharge location to be determined pending rehab evaluations. Code Status: Level 1 - Full Code    Subjective: This morning, Mr. Reyna Puente is seen lying up in bed awake, alert, nonverbal but engaged with exam, in no acute distress. Nods or shakes his head in response to questions. Endorses abdominal pain and shortness of breath. Remainder of brief review of systems is negative.     Objective:     Vitals:   Temp (24hrs), Av.2 °F (35.1 °C), Min:90.3 °F (32.4 °C), Max:98.2 °F (36.8 °C)    Temp:  [90.3 °F (32.4 °C)-98.2 °F (36.8 °C)] 98.2 °F (36.8 °C)  HR:  [61-93] 93  Resp:  [16-24] 18  BP: ()/(54-93) 106/69  SpO2:  [92 %-98 %] 92 %  Body mass index is 39.64 kg/m². Input and Output Summary (last 24 hours): Intake/Output Summary (Last 24 hours) at 7/29/2023 1402  Last data filed at 7/29/2023 0201  Gross per 24 hour   Intake --   Output 825 ml   Net -825 ml       Physical Exam:   Physical Exam  Vitals and nursing note reviewed. Constitutional:       General: He is not in acute distress. Appearance: Normal appearance. He is obese. He is ill-appearing. He is not toxic-appearing or diaphoretic. HENT:      Head: Normocephalic and atraumatic. Cardiovascular:      Rate and Rhythm: Normal rate and regular rhythm. Pulses: Normal pulses. Heart sounds: Normal heart sounds. No murmur heard. No friction rub. No gallop. Pulmonary:      Effort: No respiratory distress. Breath sounds: Normal breath sounds. No stridor. No wheezing, rhonchi or rales. Comments: Tachypnea  Chest:      Chest wall: No tenderness. Abdominal:      General: Bowel sounds are normal. There is no distension. Palpations: Abdomen is soft. Tenderness: There is abdominal tenderness (Hernias tender to palpation otherwise remainder of abdomen is nontender). There is no guarding or rebound. Hernia: A hernia (Large periumbilical hernia is tender to palpation) is present. Musculoskeletal:         General: No swelling, tenderness, deformity or signs of injury. Right lower leg: Edema (2-3+ pitting edema to the upper shin) present. Left lower leg: Edema (2-3+ pitting edema to the upper shin) present. Skin:     General: Skin is warm and dry. Coloration: Skin is not jaundiced or pale. Neurological:      Mental Status: He is alert. Motor: Weakness (4+ strength in the bilateral upper extremities) present.         Additional Data:     Labs:  Results from last 7 days   Lab Units 07/29/23  0433 07/28/23  3047 WBC Thousand/uL 5.78 5.17   HEMOGLOBIN g/dL 12.2 13.7   HEMATOCRIT % 36.1* 39.8   PLATELETS Thousands/uL 210 228   NEUTROS PCT %  --  74   LYMPHS PCT %  --  16   MONOS PCT %  --  8   EOS PCT %  --  1     Results from last 7 days   Lab Units 07/29/23  0433   SODIUM mmol/L 131*   POTASSIUM mmol/L 3.2*   CHLORIDE mmol/L 87*   CO2 mmol/L 35*   BUN mg/dL 10   CREATININE mg/dL 0.78   ANION GAP mmol/L 9   CALCIUM mg/dL 9.0   ALBUMIN g/dL 3.9   TOTAL BILIRUBIN mg/dL 0.46   ALK PHOS U/L 118*   ALT U/L 156*   AST U/L 77*   GLUCOSE RANDOM mg/dL 91                 Results from last 7 days   Lab Units 07/29/23  0155 07/28/23  2314 07/28/23  2101   LACTIC ACID mmol/L 2.0 2.1*  --    PROCALCITONIN ng/ml  --   --  0.09       Lines/Drains:  Invasive Devices     Peripheral Intravenous Line  Duration           Peripheral IV 07/28/23 Left Hand <1 day    Peripheral IV 07/28/23 Right Antecubital <1 day          Drain  Duration           Urethral Catheter 16 Fr. <1 day              Urinary Catheter:  Goal for removal: Voiding trial when ambulation improves           Telemetry:  Telemetry Orders (From admission, onward)             24 Hour Telemetry Monitoring  Continuous x 24 Hours (Telem)        Question:  Reason for 24 Hour Telemetry  Answer:  Metabolic/electrolyte disturbance with high probability of dysrhythmia. K level <3 or >6 OR KCL infusion >10mEq/hr                 Telemetry Reviewed: Normal Sinus Rhythm in 80s at present, 2 episodes of bradycardia captured  Indication for Continued Telemetry Use: Arrthymias requiring medical therapy             Imaging: Reviewed radiology reports from this admission including: chest xray and CT head and Personally reviewed the following imaging: chest xray    Recent Cultures (last 7 days):   Results from last 7 days   Lab Units 07/29/23  0047   BLOOD CULTURE  Received in Microbiology Lab. Culture in Progress. Received in Microbiology Lab. Culture in Progress.        Last 24 Hours Medication List:   Current Facility-Administered Medications   Medication Dose Route Frequency Provider Last Rate   • albuterol  2 puff Inhalation Q4H PRN Arnaldo Jauregui DO     • albuterol  2.5 mg Nebulization Q4H PRN Varsha Chaney DO     • enoxaparin  40 mg Subcutaneous Q12H [address]  Lieutenant Gordon MD     • FLUoxetine  10 mg Oral Daily Arnaldo Jauregui DO     • [START ON 7/30/2023] potassium chloride  10 mEq Oral Daily Jeet Cervantes MD     • QUEtiapine  25 mg Oral HS Arnaldo Jauregui DO     • [START ON 7/30/2023] torsemide  20 mg Oral Daily Jeet Cervantes MD          Today, Patient Was Seen By: Jeet Cervantes MD    **Please Note: This note may have been constructed using a voice recognition system. **

## 2023-07-30 LAB
ANION GAP SERPL CALCULATED.3IONS-SCNC: 9 MMOL/L
ANION GAP SERPL CALCULATED.3IONS-SCNC: 9 MMOL/L
BUN SERPL-MCNC: 10 MG/DL (ref 5–25)
BUN SERPL-MCNC: 10 MG/DL (ref 5–25)
CALCIUM SERPL-MCNC: 9.1 MG/DL (ref 8.4–10.2)
CALCIUM SERPL-MCNC: 9.1 MG/DL (ref 8.4–10.2)
CHLORIDE SERPL-SCNC: 88 MMOL/L (ref 96–108)
CHLORIDE SERPL-SCNC: 88 MMOL/L (ref 96–108)
CO2 SERPL-SCNC: 34 MMOL/L (ref 21–32)
CO2 SERPL-SCNC: 34 MMOL/L (ref 21–32)
CREAT SERPL-MCNC: 0.8 MG/DL (ref 0.6–1.3)
CREAT SERPL-MCNC: 0.8 MG/DL (ref 0.6–1.3)
ERYTHROCYTE [DISTWIDTH] IN BLOOD BY AUTOMATED COUNT: 14.4 % (ref 11.6–15.1)
ERYTHROCYTE [DISTWIDTH] IN BLOOD BY AUTOMATED COUNT: 14.4 % (ref 11.6–15.1)
GFR SERPL CREATININE-BSD FRML MDRD: 114 ML/MIN/1.73SQ M
GFR SERPL CREATININE-BSD FRML MDRD: 114 ML/MIN/1.73SQ M
GLUCOSE SERPL-MCNC: 86 MG/DL (ref 65–140)
GLUCOSE SERPL-MCNC: 86 MG/DL (ref 65–140)
HCT VFR BLD AUTO: 37 % (ref 36.5–49.3)
HCT VFR BLD AUTO: 37 % (ref 36.5–49.3)
HGB BLD-MCNC: 12.1 G/DL (ref 12–17)
HGB BLD-MCNC: 12.1 G/DL (ref 12–17)
MAGNESIUM SERPL-MCNC: 1.9 MG/DL (ref 1.9–2.7)
MAGNESIUM SERPL-MCNC: 1.9 MG/DL (ref 1.9–2.7)
MCH RBC QN AUTO: 32.2 PG (ref 26.8–34.3)
MCH RBC QN AUTO: 32.2 PG (ref 26.8–34.3)
MCHC RBC AUTO-ENTMCNC: 32.7 G/DL (ref 31.4–37.4)
MCHC RBC AUTO-ENTMCNC: 32.7 G/DL (ref 31.4–37.4)
MCV RBC AUTO: 98 FL (ref 82–98)
MCV RBC AUTO: 98 FL (ref 82–98)
PLATELET # BLD AUTO: 201 THOUSANDS/UL (ref 149–390)
PLATELET # BLD AUTO: 201 THOUSANDS/UL (ref 149–390)
PMV BLD AUTO: 9.3 FL (ref 8.9–12.7)
PMV BLD AUTO: 9.3 FL (ref 8.9–12.7)
POTASSIUM SERPL-SCNC: 3.3 MMOL/L (ref 3.5–5.3)
POTASSIUM SERPL-SCNC: 3.3 MMOL/L (ref 3.5–5.3)
RBC # BLD AUTO: 3.76 MILLION/UL (ref 3.88–5.62)
RBC # BLD AUTO: 3.76 MILLION/UL (ref 3.88–5.62)
SODIUM SERPL-SCNC: 131 MMOL/L (ref 135–147)
SODIUM SERPL-SCNC: 131 MMOL/L (ref 135–147)
WBC # BLD AUTO: 6.96 THOUSAND/UL (ref 4.31–10.16)
WBC # BLD AUTO: 6.96 THOUSAND/UL (ref 4.31–10.16)

## 2023-07-30 PROCEDURE — 97167 OT EVAL HIGH COMPLEX 60 MIN: CPT

## 2023-07-30 PROCEDURE — 99232 SBSQ HOSP IP/OBS MODERATE 35: CPT | Performed by: INTERNAL MEDICINE

## 2023-07-30 PROCEDURE — 83735 ASSAY OF MAGNESIUM: CPT

## 2023-07-30 PROCEDURE — 80048 BASIC METABOLIC PNL TOTAL CA: CPT

## 2023-07-30 PROCEDURE — 97530 THERAPEUTIC ACTIVITIES: CPT

## 2023-07-30 PROCEDURE — 97163 PT EVAL HIGH COMPLEX 45 MIN: CPT

## 2023-07-30 PROCEDURE — 85027 COMPLETE CBC AUTOMATED: CPT

## 2023-07-30 RX ORDER — POTASSIUM CHLORIDE 14.9 MG/ML
20 INJECTION INTRAVENOUS
Status: DISCONTINUED | OUTPATIENT
Start: 2023-07-30 | End: 2023-07-30

## 2023-07-30 RX ORDER — POTASSIUM CHLORIDE 20 MEQ/1
40 TABLET, EXTENDED RELEASE ORAL ONCE
Status: COMPLETED | OUTPATIENT
Start: 2023-07-30 | End: 2023-07-30

## 2023-07-30 RX ADMIN — ENOXAPARIN SODIUM 40 MG: 40 INJECTION SUBCUTANEOUS at 09:22

## 2023-07-30 RX ADMIN — TORSEMIDE 20 MG: 20 TABLET ORAL at 09:22

## 2023-07-30 RX ADMIN — ENOXAPARIN SODIUM 40 MG: 40 INJECTION SUBCUTANEOUS at 21:21

## 2023-07-30 RX ADMIN — POTASSIUM CHLORIDE 40 MEQ: 1500 TABLET, EXTENDED RELEASE ORAL at 10:32

## 2023-07-30 RX ADMIN — POTASSIUM CHLORIDE 10 MEQ: 750 TABLET, EXTENDED RELEASE ORAL at 09:22

## 2023-07-30 RX ADMIN — FLUOXETINE 10 MG: 10 CAPSULE ORAL at 09:22

## 2023-07-30 RX ADMIN — QUETIAPINE FUMARATE 25 MG: 25 TABLET ORAL at 21:21

## 2023-07-30 RX ADMIN — POTASSIUM CHLORIDE 20 MEQ: 14.9 INJECTION, SOLUTION INTRAVENOUS at 09:22

## 2023-07-30 NOTE — PLAN OF CARE
Problem: PHYSICAL THERAPY ADULT  Goal: Performs mobility at highest level of function for planned discharge setting. See evaluation for individualized goals. Description: Treatment/Interventions: Functional transfer training, LE strengthening/ROM, Therapeutic exercise, Endurance training, Cognitive reorientation, Patient/family training, Equipment eval/education, Bed mobility, Gait training, Spoke to MD, Spoke to nursing (PT to see for elevations assessment and goal creation when appropriate)    Equipment Recommended:  (TBD pending progress)     See flowsheet documentation for full assessment, interventions and recommendations. Outcome: Progressing  Note: Prognosis: Fair  Problem List: Decreased strength, Decreased range of motion, Decreased endurance, Impaired balance, Decreased mobility, Impaired judgement, Decreased safety awareness, Impaired sensation, Obesity    Assessment: Pt seen for PT evaluation for mobility assessment & discharge needs. Activity orders: up and OOB as tolerated. Pt admitted 7/28/2023 s/p fall 3 days PTA with + HS, progressively declining functional and cognitive communication status, dx SIRS (systemic inflammatory response syndrome) ([address] Comorbidities affecting pt's fnxl performance include: bipolar disorder, alcohol and substance use/abuse, HFpEF, testicular CA with possible mets. During PT IE, pt requires MAXA 1 for bed mobility in hospital bed, MODA 2 for STS from EOB at significantly elevated bed height. During additional treatment time, pt requires MAXA 2 person + RW for STS from EOB at normal height, then [address] 2 person for ambulatory transition from EOB to recliner chair (2ft) w/ RW. Pt severely limited due to B LE weakness, impaired motor planning, impaired balance, decreased endurance. The AM-PAC & Barthel Index outcome tools were used to assist in determining pt safety w/ mobility/self care & appropriate d/c recommendations, see above for scores.  Pt is at risk of falls d/t multiple comorbidities, h/o falls, impaired balance, impaired sensation, impaired insight/safety awareness, use of ambulatory aid, varying levels of pain, acuity of medical illness, ongoing medical treatment of primary dx and cognitive communication deficits. Pt will benefit from continued PT services in order to address impairments, decrease risk of falls, maximize independence w/ fnxl mobility, & ensure safety w/ mobility for transition to next level of care. Based on pt presentation & impairments, pt would most appropriately benefit from Level I (maximal resource intensity), such as post acute STR. Barriers to Discharge: Decreased caregiver support, Inaccessible home environment     PT Discharge Recommendation: Post acute rehabilitation services    See flowsheet documentation for full assessment.

## 2023-07-30 NOTE — UTILIZATION REVIEW
NOTIFICATION OF INPATIENT ADMISSION   AUTHORIZATION REQUEST   SERVICING FACILITY:   [address] [place] [address] Road  [address] [place] [address]. [facility], [facility]  Tax ID: [id number]  NPI: [id number]   ATTENDING PROVIDER:  Attending Name and NPI#: Madi Caseterrancenorma [[id number]]  Address: [address] [place] [address]. [facility], [address] [place] [address]  Phone: [phone]     ADMISSION INFORMATION:  [facility]   Place of Service Code: 21  Inpatient Admission Date/Time: 7/28/23  7:48 PM  Discharge Date/Time: No discharge date for patient encounter. Admitting Diagnosis Code/Description:  Hypokalemia [E87.6]  Hyponatremia [E87.1]  Altered mental status [R41.82]  Fall, initial encounter [W19. XXXA]  Hypothermia, initial encounter [T68. XXXA]     UTILIZATION REVIEW CONTACT:  Tory Hess Utilization   Network Utilization Review Department  Phone: [phone]  Fax: [phone]  Email: [email or web address]  Contact for approvals/pending authorizations, clinical reviews, and discharge. PHYSICIAN ADVISORY SERVICES:  Medical Necessity Denial & [facility] Review  Phone: [phone]  Fax: [phone]  Email: [email or web address]

## 2023-07-30 NOTE — PLAN OF CARE
Problem: MOBILITY - ADULT  Goal: Maintain or return to baseline ADL function  Description: INTERVENTIONS:  -  Assess patient's ability to carry out ADLs; assess patient's baseline for ADL function and identify physical deficits which impact ability to perform ADLs (bathing, care of mouth/teeth, toileting, grooming, dressing, etc.)  - Assess/evaluate cause of self-care deficits   - Assess range of motion  - Assess patient's mobility; develop plan if impaired  - Assess patient's need for assistive devices and provide as appropriate  - Encourage maximum independence but intervene and supervise when necessary  - Involve family in performance of ADLs  - Assess for home care needs following discharge   - Consider OT consult to assist with ADL evaluation and planning for discharge  - Provide patient education as appropriate  Outcome: Progressing  Goal: Maintains/Returns to pre admission functional level  Description: INTERVENTIONS:  - Perform BMAT or MOVE assessment daily.   - Set and communicate daily mobility goal to care team and patient/family/caregiver. - Collaborate with rehabilitation services on mobility goals if consulted  - Perform Range of Motion  times a day. - Reposition patient every hours.   - Dangle patient  times a day  - Stand patient times a day  - Ambulate patient  times a day  - Out of bed to chair  times a day   - Out of bed for meals times a day  - Out of bed for toileting  - Record patient progress and toleration of activity level   Outcome: Progressing     Problem: Prexisting or High Potential for Compromised Skin Integrity  Goal: Skin integrity is maintained or improved  Description: INTERVENTIONS:  - Identify patients at risk for skin breakdown  - Assess and monitor skin integrity  - Assess and monitor nutrition and hydration status  - Monitor labs   - Assess for incontinence   - Turn and reposition patient  - Assist with mobility/ambulation  - Relieve pressure over bony prominences  - Avoid friction and shearing  - Provide appropriate hygiene as needed including keeping skin clean and dry  - Evaluate need for skin moisturizer/barrier cream  - Collaborate with interdisciplinary team   - Patient/family teaching  - Consider wound care consult   Outcome: Progressing     Problem: Nutrition/Hydration-ADULT  Goal: Nutrient/Hydration intake appropriate for improving, restoring or maintaining nutritional needs  Description: Monitor and assess patient's nutrition/hydration status for malnutrition. Collaborate with interdisciplinary team and initiate plan and interventions as ordered. Monitor patient's weight and dietary intake as ordered or per policy. Utilize nutrition screening tool and intervene as necessary. Determine patient's food preferences and provide high-protein, high-caloric foods as appropriate.      INTERVENTIONS:  - Monitor oral intake, urinary output, labs, and treatment plans  - Assess nutrition and hydration status and recommend course of action  - Evaluate amount of meals eaten  - Assist patient with eating if necessary   - Allow adequate time for meals  - Recommend/ encourage appropriate diets, oral nutritional supplements, and vitamin/mineral supplements  - Order, calculate, and assess calorie counts as needed  - Recommend, monitor, and adjust tube feedings based on assessed needs  - Assess need for intravenous fluids  - Provide nutrition/hydration education as appropriate  - Include patient/family/caregiver in decisions related to nutrition  Outcome: Progressing

## 2023-07-30 NOTE — ASSESSMENT & PLAN NOTE
· Potassium 2.8, Magnesium 1.8 on admission  · Patient was prescribed 10 mEq potassium daily outpatient but unsure about compliance. · Potassium this morning at 3.3;  Given 40 meq IV in addition to home med     · Plan  · Monitor BMP, Mg and replace as needed

## 2023-07-30 NOTE — ASSESSMENT & PLAN NOTE
· Weight 140 kg at cardiology office visit 7/19/2023. Today 148 kg. · Vital signs hypothermic, tachypneic, saturating mid 90s on 3 L of oxygen, later became tachypneic. Currently, hypotensive and 90s/60s. · BNP 10. Chest x-ray did not show significant vascular congestion or pleural effusions. · Echo from 6/2/2023 revealed EF of 65%. No wall motion abnormalities. · 2-3+ pitting edema in the b/l LEs to upper shin  · Has been hypokalemic. Pt has needed potassium repletion PO    · Plan:  · Resume home regimen: Torsemide 20 mg daily, potassium 10 mg daily. · Recommend standing weights. · Encourage patient to be compliant with pharmacotherapy outpatient as prescribed.

## 2023-07-30 NOTE — OCCUPATIONAL THERAPY NOTE
Occupational Therapy Evaluation/Treatment     Patient Name: Erin Mukherjee  MBRSJ'D Date: 7/30/2023  Problem List  Principal Problem:    SIRS (systemic inflammatory response syndrome) (HCC)  Active Problems:    Fall    Hyponatremia    Hypokalemia    Bipolar I disorder (HCC)    Hypothermia    (HFpEF) heart failure with preserved ejection fraction ([address])    Testicular cancer ([address])    Obesity hypoventilation syndrome ([address])    Past Medical History  History reviewed. No pertinent past medical history. Past Surgical History  History reviewed. No pertinent surgical history. 07/30/23 1128   OT Last Visit   OT Visit Date 07/30/23   Note Type   Note type Evaluation  (and Treatment)   Pain Assessment   Pain Assessment Tool 0-10   Pain Score No Pain   Restrictions/Precautions   Weight Bearing Precautions Per Order No   Other Precautions Cognitive; Chair Alarm; Bed Alarm;Multiple lines;O2;Fall Risk  (puga, 3L O2 NC)   Home Living   Type of Home House   Home Layout Two level;Bed/bath upstairs;Stairs to enter with rails  (7-8 ADIEL to enter, (+) full flight to bed/bath)   Bathroom Shower/Tub Tub/shower unit   Bathroom Toilet Standard   Bathroom Equipment   (none per sig other and pt)   Bathroom Accessibility Not accessible  (bath only on second floor)   Home Equipment   (none per sig other and pt)   Additional Comments Per sig other, pt spend his day on first floor in recliner. Goes upstairs once a day w/ someone present   Prior Function   Level of [place] Independent with ADLs; Independent with functional mobility; Needs assistance with IADLS   Lives With Family  (sig other and young children, sister in-law and sig other)   Receives Help From Family  (s/o and sister in-law work out of house during day)   IADLs Family/Friend/Other provides transportation; Family/Friend/Other provides meals; Family/Friend/Other provides medication management  (for approx.  a year)   Falls in the last 6 months 1 to 4  (only 1 fall; many near misses per spouse)   Vocational Unemployed  (was working in "[employer] however has not been able to work for last year 2* to declining health)   Comments Pt poor historian due to cognitive deficits, processing, ability to respond. Per spouse pt is usually able to complete ADLs (I) with intermitent (A) depending on the day. Does not leave the house alone. Able to walk without AD. Has assistance for all ADLs. Lifestyle   Autonomy Pt lives with family in a 2 level house, (I) with ADLs, (A) with IADLs, no AD, (+) falls, (-)    Reciprocal Relationships Supportive sig other and family   Service to Others Unemployed. Was working in [employer] however has not been able to wrok for past year due to medical issues. General   Additional Pertinent History Pt admitted s/p fall at home with (+) headstrike on 7/26 w/ change in mental status. Family/Caregiver Present Yes  (Sig other and children)   Additional General Comments Hx of Testicular cancer on Chemo (3/20/2023 - 5/26/2023), however discontinued due to side effects (double vision, weakness on the right side, labile affect), duration of therapy unknown. Subjective   Subjective Able to respond to yes/no questions. Able to respond few words with (+) time   ADL   Eating Assistance 5  Supervision/Setup   Eating Deficit Setup; Beverage management   Grooming Assistance 5  Supervision/Setup   UB Bathing Assistance 3  Moderate Assistance   LB Bathing Assistance 2  Maximal Assistance   UB Dressing Assistance 3  Moderate Assistance   UB Dressing Deficit Setup;Verbal cueing;Supervision/safety; Increased time to complete; Thread RUE; Thread LUE;Pull around back  ((A) to initiate, sequence, physically perform task.  Slow movements, able to raise arms for therapist to thread over)   LB Dressing Assistance 2  Maximal Assistance   Toileting Assistance  2  Maximal Assistance   Toileting Deficit   (puga)   Bed Mobility   Supine to Sit 2  Maximal assistance Additional items Assist x 1;Bedrails;HOB elevated; Increased time required;Verbal cues;LE management   Additional Comments Variable (S) to min A to maintain sitting EOB. OOB to recliner at end of session   Transfers   Sit to Stand 3  Moderate assistance   Additional items Assist x 2;Trapeze bar;Verbal cues  (elevated bed height)   Stand to Sit 2  Maximal assistance   Additional items Assist x 2; Increased time required;Verbal cues  (uncontrolled descent)   Additional Comments mod A x 2 and RW to maintain standing balance <1 minute. Evidence of BLE knee buckling due to fatigue   Balance   Static Sitting Fair +   Dynamic Sitting Fair   Static Standing Poor   Dynamic Standing Poor -   Activity Tolerance   Activity Tolerance Patient limited by fatigue  (SOB/FITZPATRICK)   Medical Staff Made Aware Care coordinated with PT Javier Mary   Nurse Made Aware yes JEAN-PAUL Balbuena   RUE Assessment   RUE Assessment WFL  (mild edema noted throughout)   RUE Strength   RUE Overall Strength Within Functional Limits - able to perform ADL tasks with strength   LUE Assessment   LUE Assessment WFL  (mild edema noted throughout)   LUE Strength   LUE Overall Strength Within Functional Limits - able to perform ADL tasks with strength   Hand Function   Gross Motor Coordination Impaired  (Bradykinesia (questionable due to weakness vs command follow/processing))   Fine Motor Coordination Impaired   Hand Function Comments excessively slowed movements, L hand dominant. Strength equal with (+) time and effort   Sensation   Light Touch No apparent deficits   Vision-Basic Assessment   Current Vision Does not wear glasses   Psychosocial   Psychosocial (WDL) X   Patient Behaviors/Mood Flat affect   Cognition   Overall Cognitive Status Impaired   Arousal/Participation Alert; Cooperative   Attention Attends with cues to redirect   Orientation Level Oriented to person;Oriented to place; Disoriented to time;Disoriented to situation   Memory Decreased short term memory;Decreased recall of recent events;Decreased recall of precautions   Following Commands Follows one step commands with increased time or repetition   Comments Flat affect, attends to cues with (+) time. Intermittently responds yes/no to questions/commands. Displays increased time to process information, able to follow simple 1 step commands with repetition. Impaired motor planning due to processing? ? Recommend continued cognitive evaluation/formal cognitive testing. Hx of Bipolar I   Assessment   Limitation Decreased ADL status; Decreased UE ROM; Decreased Safe judgement during ADL;Decreased cognition;Decreased endurance;Decreased self-care trans;Decreased high-level ADLs  (balance, act michael, GM coord, fxnl reach, standing michael, strength and fxnl sitting balance, direction following, safety awareness, insight, pacing, problem solving, learning new tasks, initiation and appropriate responses , display of emotion, response time)   Prognosis Good   Assessment Pt is a 41 y.o. male seen for OT evaluation s/p admit to [address] [place] [address] on 7/28/2023 w/SIRS (systemic inflammatory response syndrome) ([facility]). Prior to admission, pt was living with sig other and family in a 2 level house, (I) with ADLs, (+) with IADLs, (+) falls, (-) . Personal and environmental factors affecting patient at time of evaluation include current habits and behavioral patterns, limited social support, inaccessible home environment, inaccessible bathroom environment, difficulty completing ADLs and difficulty completing IADLs. Personal factors supporting patient at time of evaluation include age, (I) PLOF and supportive sig other and family. Based upon this evaluation, pt is functioning below baseline. Pt will benefit from continued skilled inpatient OT to maximize safety, level of independence overall performance in ADLs, functional transfers, functional mobility to return to functional baseline/highest level of function.    Goals   Patient Goals none stated due to cognition; will continue to assess   LTG Time Frame 10-14   Long Term Goal #1 see goals below   Plan   Treatment Interventions ADL retraining;Functional transfer training; Endurance training;Cognitive reorientation;Patient/family training;Equipment evaluation/education; Compensatory technique education;Continued evaluation; Energy conservation; Activityengagement   Goal Expiration Date 08/09/23   OT Treatment Day 0   OT Frequency 3-5x/wk   Recommendation   OT Discharge Recommendation Post acute rehabilitation services   Additional Comments  The patient's raw score on the AM-PAC Daily Activity inpatient short form is 15, standardized score is 34.69, less than 39.4. From an OT standpoint, patients at this level may benefit from d/c to Post acute rehabilitation services. Additional Comments 2 Pt seen as a co-eval with PT due to the patient's co-morbidities, clinically unstable presentation, and present impairments which are a regression from the patient's baseline. [facility] [address] [facility] Daily Activity Inpatient   Lower Body Dressing 2   Bathing 2   Toileting 2   Upper Body Dressing 2   Grooming 3   Eating 4   Daily Activity Raw Score 15   Daily Activity Standardized Score (Calc for Raw Score >=11) 34.69   -PAC Applied Cognition Inpatient   Following a Speech/Presentation 2   Understanding Ordinary Conversation 3   Taking Medications 1   Remembering Where Things Are Placed or Put Away 2   Remembering List of 4-5 Errands 1   Taking Care of Complicated Tasks 1   Applied Cognition Raw Score 10   Applied Cognition Standardized Score 24.98   Additional Treatment Session   Start Time 1115   End Time 1128   Treatment Assessment Pt seen for additional treatment session to trial OOB to recliner for upright sitting tolerance. Pt required max A x 2 from standard bed height, increased cues for safety/hand placement/initiation.  Pt able to take few steps from EOB>recliner to pt R, limited by weakness, BLE fatigue, knee buckle, command follow and problem solving. Pt noted to have increased WOB after exertion, SpO2 dropping to 86% on 3L, required VC for deep breathing (pt frequently observed to breathe through mouth). Pt continues to require rehab. Pt will benefit from continued skilled inpatient OT to maximize safety, level of independence overall performance in ADLs, functional transfers, functional mobility to return to functional baseline/highest level of function. Additional Treatment Day 1   End of Consult   Patient Position at End of Consult Bedside chair;Bed/Chair alarm activated; All needs within reach   Nurse Communication Nurse aware of consult     GOALS:    *Goals established to promote patient goal to return to functional baseline:      *ADL transfers with Min (A) x 2 for inc'd independence with ADLs/purposeful tasks    *Patient will perform grooming tasks sitting EOB with (S) in order to increase overall independence and fine motor control      *UB ADL with (S) for inc'd independence with self care and gross motor control/motor planning     *LB ADL with Mod (A) using AE prn for inc'd independence with self care and increased functional reach     *Toileting with Mod (A) for clothing management and hygiene to increase hygiene/thoroughness in order to reduce caregiver burden    *Increase stand tolerance x 2  m for inc'd tolerance with standing purposeful tasks    *Participate in 10m UE therex to increase overall stamina/activity tolerance for purposeful tasks    *Bed mobility- Min (A) for inc'd independence to manage own comfort and initiate EOB & OOB purposeful tasks    *Patient will verbalize 3 safety awareness/ principles to prevent falls in the home setting. *Patient will verbalize and demonstrate use of energy conservation/deep breathing techniques and work simplification skills during functional activities with no verbal cues.      *Patient will increase OOB/sitting tolerance to 2-4 hours per day to increase participation in self-care and leisure tasks with no s/s of exertion. *Patient will engage in ongoing cognitive assessment to assist with safe discharge planning/recommendations. *Patient will increase functional mobility to and from [facility] with rolling walker with min assist x 2 to increase independence with toileting    *Patient will improve functional activity tolerance to 20 minutes of sustained functional tasks to increase participation in basic self-care and decrease assistance level.      *Pt will consistently follow one step directions during ADL performance w/ 50-75% accuracy to max I and safety w/ ADL performance    Jaspreet Todd, OTR/L    [place] License [id number]  [address] [place] [address] [id number]

## 2023-07-30 NOTE — ASSESSMENT & PLAN NOTE
· Met 2 out of 4 SIRS criteria initially (hypothermia, tachypnea), saturating mid 90s on 3 L of oxygen, was later tachycardia with soft blood pressures. · GCS 15. Oriented to self, place but not time or situation. Patient is nonverbal for the most part, just nods to questions. · Labs significant for sodium of 129, potassium 2.9, mildly elevated liver enzymes. · Lactic acid 2.1 --> 2.0. Normal WBC. Pro-Scott negative. UA negative. COVID-negative. · Physical exam findings, significant for wheezing in bilateral lung fields, prominent in the upper with decreased air movement. Unable to take deep breaths, rapid intermittent shallow breaths. 1+ bilateral lower extremity edema. · ED course: 1 L bolus of normal saline, 20 mEq of potassium. · Source unclear, suspect most likely viral respiratory illness versus bacterial (Pro-Scott negative). · Large periumbilical hernia noted on exam, tender to palpation  · At admission, patient started on BiPAP due to increased work of breathing. Transitioned off BiPAP w RT on 7/30    · Plan:  · Hold additional IVF  · Monitor off antibiotics absent a source. · CT C/A/P w con ordered to evaluate for source of occult infection--f/u results  · Trend WBC, fever  · Follow-up on blood cultures. · Monitor CMP, CBC in the AM.  · Albuterol as needed.   · O2 wean

## 2023-07-30 NOTE — ASSESSMENT & PLAN NOTE
· Patient is a poor historian, etiology/mechanism of fall unclear. · Trauma work-up at the ED was unremarkable for any acute traumatic injuries. · MRI brain auditory canal w wo contrast 3/15/2023: 3 mm focus of enhancement in the lateral aspect of the left IAC, possibly a mass lesion such as a schwannoma versus inflammation. Patient is being followed by neurosurgery outpatient. · Pt continues to be nonverbal. Unknown etiology. · Plan:  · Per Neurosurgery, serial monitoring was recommended instead of radiation or surgery   · MRI ordered 7/30  · Follow-up with neurosurgery outpatient.   · PT/OT

## 2023-07-30 NOTE — ASSESSMENT & PLAN NOTE
· Patient denies snoring, PND. · VBG results: Respiratory acidosis with metabolic compensation. Normal pH. · Suspect could be due to obesity hypoventilation syndrome: BMI 42.78 or undiagnosed CROW   · Patient weaned off BiPAP on 7/29   · Currently on 6L O2 satting high 90s    · Plan:  ·  Continue to wean on O2   · Encourage patient to get sleep study outpatient.

## 2023-07-30 NOTE — PLAN OF CARE
Problem: OCCUPATIONAL THERAPY ADULT  Goal: Performs self-care activities at highest level of function for planned discharge setting. See evaluation for individualized goals. Description: Treatment Interventions: ADL retraining, Functional transfer training, Endurance training, Cognitive reorientation, Patient/family training, Equipment evaluation/education, Compensatory technique education, Continued evaluation, Energy conservation, Activityengagement          See flowsheet documentation for full assessment, interventions and recommendations. Note: Limitation: Decreased ADL status, Decreased UE ROM, Decreased Safe judgement during ADL, Decreased cognition, Decreased endurance, Decreased self-care trans, Decreased high-level ADLs (balance, act michael, GM coord, fxnl reach, standing michael, strength and fxnl sitting balance, direction following, safety awareness, insight, pacing, problem solving, learning new tasks, initiation and appropriate responses , display of emotion, response time)  Prognosis: Good  Assessment: Pt is a 41 y.o. male seen for OT evaluation s/p admit to [facility] on 7/28/2023 w/SIRS (systemic inflammatory response syndrome) ([address] Prior to admission, pt was living with sig other and family in a 2 level house, (I) with ADLs, (+) with IADLs, (+) falls, (-) . Personal and environmental factors affecting patient at time of evaluation include current habits and behavioral patterns, limited social support, inaccessible home environment, inaccessible bathroom environment, difficulty completing ADLs and difficulty completing IADLs. Personal factors supporting patient at time of evaluation include age, (I) PLOF and supportive sig other and family. Based upon this evaluation, pt is functioning below baseline.  Pt will benefit from continued skilled inpatient OT to maximize safety, level of independence overall performance in ADLs, functional transfers, functional mobility to return to functional baseline/highest level of function.      OT Discharge Recommendation: Post acute rehabilitation services     Foreign Flores [facility] [place], OTR/L  [place] License [id number]  [id number]

## 2023-07-30 NOTE — PROGRESS NOTES
[address] [facility]  Progress Note  Name: Francesco Moses  MRN: [id number]  Unit/Bed#: S -01 I Date of Admission: 7/28/2023   Date of Service: 7/30/2023 I Hospital Day: 2    Assessment/Plan   Obesity hypoventilation syndrome [facility])  Assessment & Plan  · Patient denies snoring, PND. · VBG results: Respiratory acidosis with metabolic compensation. Normal pH. · Suspect could be due to obesity hypoventilation syndrome: BMI 42.78 or undiagnosed CROW   · Patient weaned off BiPAP on 7/29   · Currently on 6L O2 satting high 90s    · Plan:  ·  Continue to wean on O2   · Encourage patient to get sleep study outpatient. Testicular cancer [facility])  Assessment & Plan  · Patient's chemotherapy regimen cisplatin, etoposide (3/20/2023 - 5/26/2023), discontinued due to side effects (double vision, weakness on the right side, labile affect), duration of therapy unknown. · Positive family history of cancer in his father who had lung and bone cancer. · Plan:  · Follow-up with heme-onc outpatient. (HFpEF) heart failure with preserved ejection fraction (HCC)  Assessment & Plan  · Weight 140 kg at cardiology office visit 7/19/2023. Today 148 kg. · Vital signs hypothermic, tachypneic, saturating mid 90s on 3 L of oxygen, later became tachypneic. Currently, hypotensive and 90s/60s. · BNP 10. Chest x-ray did not show significant vascular congestion or pleural effusions. · Echo from 6/2/2023 revealed EF of 65%. No wall motion abnormalities. · 2-3+ pitting edema in the b/l LEs to upper shin  · Has been hypokalemic. Pt has needed potassium repletion IV     · Plan:  · Resume home regimen: Torsemide 20 mg daily, potassium 10 mg daily. · Recommend standing weights. · Encourage patient to be compliant with pharmacotherapy outpatient as prescribed.     Hypothermia  Assessment & Plan  · Temperature POA 90.3  · Of note: Temperatures were initially taken by Sheppard probe (possible falsely low measurements due to misplaced probe or malfunctioning). Later, switched to oral temperature after discontinuing bear hugger. · Resolved. Bipolar I disorder (HCC)  Assessment & Plan  · Home regimen Seroquel 25 mg and fluoxetine 10 mg daily. Patient reports being compliant with medication but girlfriend disagrees. · Plan:  · Resume home medications. · Encourage patient to be compliant with pharmacotherapy outpatient. Hypokalemia  Assessment & Plan  · Potassium 2.8, Magnesium 1.8 on admission  · Patient was prescribed 10 mEq potassium daily outpatient but unsure about compliance. · Potassium this morning at 3.3; Given 40 meq IV in addition to home med     · Plan  · Monitor BMP, Mg and replace as needed    Hyponatremia  Assessment & Plan  · Sodium 128 on admission. · Etiology unclear, could be due to acute on chronic CHF, possible torsemide use, or SIADH secondary to psych meds (antipsychotics, SSRIs). Appears volume overloaded on exam, suspect hypervolemic hyonatremia d/t CHF. · Plan:  · Hold additional IVF  · Resume home torsemide  · Monitor Na, most recent [address]  · Patient is a poor historian, etiology/mechanism of fall unclear. · Trauma work-up at the ED was unremarkable for any acute traumatic injuries. · MRI brain auditory canal w wo contrast 3/15/2023: 3 mm focus of enhancement in the lateral aspect of the left IAC, possibly a mass lesion such as a schwannoma versus inflammation. Patient is being followed by neurosurgery outpatient. · Pt continues to be nonverbal. Unknown etiology. · Plan:  · Per Neurosurgery, serial monitoring was recommended instead of radiation or surgery   · MRI ordered 7/30  · Follow-up with neurosurgery outpatient. · PT/OT    * SIRS (systemic inflammatory response syndrome) (HCC)  Assessment & Plan  · Met 2 out of 4 SIRS criteria initially (hypothermia, tachypnea), saturating mid 90s on 3 L of oxygen, was later tachycardia with soft blood pressures.    · GCS 15.  Oriented to self, place but not time or situation. Patient is nonverbal for the most part, just nods to questions. · Labs significant for sodium of 129, potassium 2.9, mildly elevated liver enzymes. · Lactic acid 2.1 --> 2.0. Normal WBC. Pro-Scott negative. UA negative. COVID-negative. · Physical exam findings, significant for wheezing in bilateral lung fields, prominent in the upper with decreased air movement. Unable to take deep breaths, rapid intermittent shallow breaths. 1+ bilateral lower extremity edema. · ED course: 1 L bolus of normal saline, 20 mEq of potassium. · Source unclear, suspect most likely viral respiratory illness versus bacterial (Pro-Scott negative). · Large periumbilical hernia noted on exam, tender to palpation  · At admission, patient started on BiPAP due to increased work of breathing. Transitioned off BiPAP w RT on 7/30    · Plan:  · Hold additional IVF  · Monitor off antibiotics absent a source. · CT C/A/P w con ordered to evaluate for source of occult infection--f/u results  · Trend WBC, fever  · Follow-up on blood cultures. · Monitor CMP, CBC in the AM.  · Albuterol as needed. · O2 wean                VTE Pharmacologic Prophylaxis: VTE Score: 6 High Risk (Score >/= 5) - Pharmacological DVT Prophylaxis Ordered: enoxaparin (Lovenox). Sequential Compression Devices Ordered. Patient Centered Rounds: I performed bedside rounds with nursing staff today. Discussions with Specialists or Other Care Team Provider: none    Education and Discussions with Family / Patient: Updated  (Girlfriend) at bedside. Current Length of Stay: 2 day(s)  Current Patient Status: Inpatient   Discharge Plan: Anticipate discharge in 48-72 hrs to home. Code Status: Level 1 - Full Code    Subjective:   No acute events overnight. Patient denies headache, chest pain, shortness of breath, abdominal pain. Patient is not nonverbal.  Patient does respond but is very slow.   Per the patient's girlfriend this has been present for the last 2 months. Objective:     Vitals:   Temp (24hrs), Av.3 °F (36.8 °C), Min:97.7 °F (36.5 °C), Max:99.6 °F (37.6 °C)    Temp:  [97.7 °F (36.5 °C)-99.6 °F (37.6 °C)] 97.7 °F (36.5 °C)  HR:  [] 88  BP: (104-121)/(63-79) 121/79  SpO2:  [92 %-96 %] 96 %  Body mass index is 41.33 kg/m². Input and Output Summary (last 24 hours): Intake/Output Summary (Last 24 hours) at 2023 0902  Last data filed at 2023 0400  Gross per 24 hour   Intake --   Output 950 ml   Net -950 ml       Physical Exam:   Physical Exam  Vitals and nursing note reviewed. Constitutional:       General: He is not in acute distress. Appearance: He is ill-appearing. HENT:      Head: Normocephalic and atraumatic. Cardiovascular:      Rate and Rhythm: Normal rate and regular rhythm. Pulses: Normal pulses. Heart sounds: Normal heart sounds. Pulmonary:      Effort: No respiratory distress. Breath sounds: Wheezing present. Abdominal:      General: Bowel sounds are normal. There is no distension. Palpations: Abdomen is soft. Tenderness: There is no abdominal tenderness. There is no guarding. Additional Data:     Labs:  Results from last 7 days   Lab Units 23  0517 23  0433 23  1855   WBC Thousand/uL 6.96   < > 5.17   HEMOGLOBIN g/dL 12.1   < > 13.7   HEMATOCRIT % 37.0   < > 39.8   PLATELETS Thousands/uL 201   < > 228   NEUTROS PCT %  --   --  74   LYMPHS PCT %  --   --  16   MONOS PCT %  --   --  8   EOS PCT %  --   --  1    < > = values in this interval not displayed.      Results from last 7 days   Lab Units 23  0523  1427 23  0433   SODIUM mmol/L 131*   < > 131*   POTASSIUM mmol/L 3.3*   < > 3.2*   CHLORIDE mmol/L 88*   < > 87*   CO2 mmol/L 34*   < > 35*   BUN mg/dL 10   < > 10   CREATININE mg/dL 0.80   < > 0.78   ANION GAP mmol/L 9   < > 9   CALCIUM mg/dL 9.1   < > 9.0   ALBUMIN g/dL  --   -- 3. 9   TOTAL BILIRUBIN mg/dL  --   --  0.46   ALK PHOS U/L  --   --  118*   ALT U/L  --   --  156*   AST U/L  --   --  77*   GLUCOSE RANDOM mg/dL 86   < > 91    < > = values in this interval not displayed. Results from last 7 days   Lab Units 07/29/23  0155 07/28/23  2314 07/28/23  2101   LACTIC ACID mmol/L 2.0 2.1*  --    PROCALCITONIN ng/ml  --   --  0.09       Lines/Drains:  Invasive Devices     Peripheral Intravenous Line  Duration           Peripheral IV 07/28/23 Left Hand 1 day    Peripheral IV 07/28/23 Right Antecubital 1 day          Drain  Duration           Urethral Catheter 16 Fr. 1 day              Urinary Catheter:  Goal for removal: Voiding trial when ambulation improves               Imaging: Reviewed radiology reports from this admission including: chest xray and chest CT scan    Recent Cultures (last 7 days):   Results from last 7 days   Lab Units 07/29/23  0047   BLOOD CULTURE  No Growth at 24 hrs. No Growth at 24 hrs. Last 24 Hours Medication List:   Current Facility-Administered Medications   Medication Dose Route Frequency Provider Last Rate   • albuterol  2 puff Inhalation Q4H PRN Arnaldo Jauregui DO     • albuterol  2.5 mg Nebulization Q4H PRN Margarita Ley DO     • enoxaparin  40 mg Subcutaneous Q12H [address] Armida Lombardi MD     • FLUoxetine  10 mg Oral Daily Arnaldo Jauregui DO     • potassium chloride  10 mEq Oral Daily Tarah Tenorio MD     • potassium chloride  20 mEq Intravenous Q2H Dania Griffiths MD 20 mEq (07/30/23 2880)   • QUEtiapine  25 mg Oral HS Arnaldo Jauregui DO     • torsemide  20 mg Oral Daily Tarah Tenorio MD          Today, Patient Was Seen By: Dania Griffiths MD    **Please Note: This note may have been constructed using a voice recognition system. **

## 2023-07-30 NOTE — PHYSICAL THERAPY NOTE
PHYSICAL THERAPY EVALUATION & TREATMENT  DATE: 07/30/23  TIME: 7744-1481    NAME:  Naheed Swanson  AGE:   41 y.o. Mrn:   [id number]  Length Of Stay: 2    ADMIT DX:  Hypokalemia [E87.6]  Hyponatremia [E87.1]  Altered mental status [R41.82]  Fall, initial encounter [W19. XXXA]  Hypothermia, initial encounter [T68. XXXA]    History reviewed. No pertinent past medical history. History reviewed. No pertinent surgical history. Performed at least 2 patient identifiers during session: Name, Jeral Ion, and ID bracelet     07/30/23 1127   PT Last Visit   PT Visit Date 07/30/23   Note Type   Note type Evaluation  (& treatment)   Pain Assessment   Pain Assessment Tool 0-10   Pain Score No Pain   Effect of Pain on Daily Activities n/a   Hospital Pain Intervention(s) Repositioned; Ambulation/increased activity   Multiple Pain Sites No   Restrictions/Precautions   Weight Bearing Precautions Per Order No   Other Precautions Chair Alarm; Bed Alarm;Cognitive;Aspiration;Multiple lines;O2;Fall Risk  (+puga, +joanie)   Home Living   Type of Home House   Home Layout Two level;Bed/bath upstairs;Stairs to enter with rails  (7-8 ADIEL with HR, then full flight of steps to access 2nd floor bedroom and full bathroom; no first floor bathroom)   Bathroom Shower/Tub Tub/shower unit   Bathroom Toilet Standard   Bathroom Equipment Other (Comment)  (none)   Bathroom Accessibility Not accessible  (only bathroom in the home is on 2nd floor, after full flight of steps to negotiate)   Home Equipment Other (Comment)  (none)   Additional Comments Per pt's s/o, pt spends his day on the first floor of the home in his recliner chair. Negotiates the steps only 1-2 xday, and always has someone assisting on the steps. Prior Function   Level of [place] Independent with ADLs; Independent with functional mobility; Needs assistance with IADLS   Lives With Family  (girlfriend, young children, sister in law + her s/o)   Receives Help From Family  (s/o works out of the home during the daytime; reports another family member is typically around)   IADLs Family/Friend/Other provides transportation; Family/Friend/Other provides meals; Family/Friend/Other provides medication management  (pt's s/o reports "his legs haven't been able to hold him long enough to stand to make any food. ")   Falls in the last 6 months 1 to 4  (pt's s/o reports only 1 fall, however many "near falls" or falls where someone has caught him)   Vocational Unemployed  (previously working in [employer], however pt's s/o reports that pt has not worked in over a year d/t declining medical and functional status)   Comments Pt is a poor historian, due to possible cognitive communication deficits & acuity of medical illness. Per pt and s/o, at baseline pt is able to mobilize t/o the home with no AD, always has supervision/assist on the stairs, has assistance prn for ADLs, has assist with all IADLs. General   Additional Pertinent History Pt admitted 7/28/23 s/p fall with headstrike 3 days PTA, progressively worsening functional status and cognitive communication status, AMS. CT head (-), MRI pending. Family/Caregiver Present Yes  (s/o present t/o session)   Cognition   Overall Cognitive Status Impaired   Arousal/Participation Cooperative   Orientation Level Oriented to person;Oriented to place; Disoriented to time;Disoriented to situation   Memory Decreased recall of precautions;Decreased recall of recent events;Decreased short term memory   Following Commands Follows one step commands inconsistently   Comments Pt displays impaired motor planning, requires increased time for processing of commands, simple/single step commands. Pt with very little verbal communication t/o session - 1-2 word answers only, words were labored.    Subjective   Subjective "I'm ok"   RUE Assessment   RUE Assessment WFL  (mild edema noted t/o entirety of UEs)   RUE Strength   RUE Overall Strength Within Functional Limits - able to perform ADL tasks with strength   LUE Assessment   LUE Assessment WFL  (mild edema noted t/o entirety of UEs)   LUE Strength   LUE Overall Strength Within Functional Limits - able to perform ADL tasks with strength   RLE Assessment   RLE Assessment X  (mild edema noted t/o entirety of B LEs and extending up into hips & back; strength observed functionally to 3/5 MMT as pt with significant difficulty following commands for standardized MMT``)   LLE Assessment   LLE Assessment X  (mild edema noted t/o entirety of B LEs and extending up into hips & back; strength observed functionally to 2/5 MMT as pt with significant difficulty following commands for standardized MMT)   Vision-Basic Assessment   Current Vision Does not wear glasses   Coordination   Movements are Fluid and Coordinated 0   Coordination and Movement Description Displays significant motor retardation, needing increased processing time and motor planning. Sensation X   Light Touch   RLE Light Touch Impaired   RLE Light Touch Comments pt reports baseline "tingling" in B foot, however lower and upper leg sensation remains WFL, pt denies worsening or expansion of "tingling" since acute medical change   LLE Light Touch Impaired   LLE Light Touch Comments pt reports baseline "tingling" in B foot, however lower and upper leg sensation remains WFL, pt denies worsening or expansion of "tingling" since acute medical change   Proprioception   RLE Proprioception Grossly intact   LLE Proprioception Grossly Intact   Bed Mobility   Supine to Sit 2  Maximal assistance   Additional items Assist x 1;HOB elevated; Bedrails; Increased time required;Verbal cues;LE management  (trunk management)   Sit to Supine   (NT as pt was left seated OOB in recliner chair at end of session)   Additional Comments Pt needing variable S to [facility] to maintain upright unsupported sitting balance at EOB.    Transfers   Sit to Stand 3  Moderate assistance   Additional items Assist x 2;Bedrails; Increased time required;Verbal cues  (SIGNIFICANTLY ELEVATED BED HEIGHT)   Stand to Sit 2  Maximal assistance   Additional items Assist x 2; Increased time required;Verbal cues  (uncontrolled descent to surface, d/t B LE fatigue)   Additional Comments Pt needing MODA 2 person + use of RW to maintain supported standing at RW. Pt noted with B LE fatigue & knee buckling quickly, unable to tolerate >1 minute in stand despite MODA 2 person + RW. Balance   Static Sitting Fair +   Dynamic Sitting Fair -   Static Standing Poor   Dynamic Standing Poor -   Ambulatory Poor -   Endurance Deficit   Endurance Deficit Yes   Endurance Deficit Description Attempted formal orthostatics during session, however pt needing more than 3 minutes between each change of position due to fatigue and SOB. See below for vitals taken during session. Activity Tolerance   Activity Tolerance Patient limited by fatigue; Other (Comment)  (limited due to SOB/FITZPATRICK, impaired communication)   Medical Staff Made Aware Spoke with [facility] resident Ronny Hickey, coordinated care with OT [address] [place] [address] with JEAN-PAUL Friedman   Assessment   Prognosis Fair   Problem List Decreased strength;Decreased range of motion;Decreased endurance; Impaired balance;Decreased mobility; Impaired judgement;Decreased safety awareness; Impaired sensation;Obesity   Assessment Pt seen for PT evaluation for mobility assessment & discharge needs. Activity orders: up and OOB as tolerated. Pt admitted 7/28/2023 s/p fall 3 days PTA with + HS, progressively declining functional and cognitive communication status, dx SIRS (systemic inflammatory response syndrome) ([address]). Comorbidities affecting pt's fnxl performance include: bipolar disorder, alcohol and substance use/abuse, HFpEF, testicular CA with possible mets. During PT IE, pt requires MAXA 1 for bed mobility in hospital bed, MODA 2 for STS from EOB at significantly elevated bed height.  During additional treatment time, pt requires MAXA 2 person + RW for STS from EOB at normal height, then [address] 2 person for ambulatory transition from EOB to recliner chair (2ft) w/ RW. Pt severely limited due to B LE weakness, impaired motor planning, impaired balance, decreased endurance. The AM-PAC & Barthel Index outcome tools were used to assist in determining pt safety w/ mobility/self care & appropriate d/c recommendations, see above for scores. Pt is at risk of falls d/t multiple comorbidities, h/o falls, impaired balance, impaired sensation, impaired insight/safety awareness, use of ambulatory aid, varying levels of pain, acuity of medical illness, ongoing medical treatment of primary dx and cognitive communication deficits. Pt will benefit from continued PT services in order to address impairments, decrease risk of falls, maximize independence w/ fnxl mobility, & ensure safety w/ mobility for transition to next level of care. Based on pt presentation & impairments, pt would most appropriately benefit from Level I (maximal resource intensity), such as post acute STR. Barriers to Discharge Decreased caregiver support; Inaccessible home environment   Goals   Patient Goals to increase pt independence and decrease burden of care   STG Expiration Date 08/09/23   Short Term Goal #1 Patient PT goals established in order to increase pt independence and decrease burden of care.  Pt will: complete all bed mobility independently in order to promote increased OOB functional mobility to improve overall activity tolerance; complete all transfers with LRAD and LUKE in order to increase safety with functional mobility; ambulate >25ft with RW and LUKE in order to increase safety with in room distance functional mobility; PT to assess for elevations and create goal when appropriate; improve B LE strength to >/= 4/5 MMT t/o in order to increase safety with functional mobility and decrease risk of falls; demonstrate understanding and independence with LE strengthening HEP; improve dynamic standing balance to >/= fair+ grade in order to promote safety and increased independence with mobility; tolerate >3hrs OOB in upright position, in order to improve muscular endurance and respiratory status; improve AM-PAC score to >/= 12/24 in order to increase independence with mobility and decrease burden of care; improve Barthel Index score to >/= 35/100 in order to increase independence and decrease risk of falls. PT Treatment Day 1   Plan   Treatment/Interventions Functional transfer training;LE strengthening/ROM; Therapeutic exercise; Endurance training;Cognitive reorientation;Patient/family training;Equipment eval/education; Bed mobility;Gait training;Spoke to MD;Spoke to nursing  (PT to see for elevations assessment and goal creation when appropriate)   PT Frequency 3-5x/wk   Recommendation   PT Discharge Recommendation Post acute rehabilitation services   Equipment Recommended   (TBD pending progress)   AM-PAC Basic Mobility Inpatient   Turning in Flat Bed Without Bedrails 2   Lying on Back to Sitting on Edge of Flat Bed Without Bedrails 1   Moving Bed to Chair 1   Standing Up From Chair Using Arms 1   Walk in Room 1   Climb 3-5 Stairs With Railing 1   Basic Mobility Inpatient Raw Score 7   Turning Head Towards Sound 3   Follow Simple Instructions 2   Low Function Basic Mobility Raw Score  12   Low Function Basic Mobility Standardized Score  18.33   Highest Level Of Mobility   JH-HLM Goal 2: Bed activities/Dependent transfer   -HLM Achieved 4: Move to chair/commode   Modified [place] Scale   Modified [place] Scale 4   Barthel Index   Feeding 5   Bathing 0   Grooming Score 0   Dressing Score 5   Bladder Score 0   Bowels Score 10   Toilet Use Score 0   Transfers (Bed/Chair) Score 5   Mobility (Level Surface) Score 0   Stairs Score 0   Barthel Index Score 25   Additional Treatment Session   Start Time 1112   End Time 1127   Treatment Assessment Pt is agreeable to participate in additional functional mobility training post IE, is agreeable to remain OOB in chair. Pt requires [address] 2 person for STS transfer from EOB at normal height, cues for hand placement on bedrail for optimal mechanics, pt with good application. Pt then requires [address] 2 person + RW for ambulatory transition of 2ft from EOB to recliner chair. Pt needing [address] 2 person for controlled descent to chair. During standing/ambulation, pt noted with severe B LE weakness, requiring B knees blocked, assist for Rw management, and increased assistance for controlled descent to sit. T/o all mobility, vitals monitored: BP stable, HR stable, SPO2 dec to lowest of 86% 3L with exertion, cues for PLB and rest for resolution of SOB w/in 3 minutes. At end of session, pt was left seated OOB in recliner chair with alarm engaged and needs in reach, family in room, care returned to RN. Regarding functional mobility, pt remains significantly below his baseline level of functional mobility and is unsafe for retrn to home upon d/c. Pt's s/o present and reports definite inability to care for pt at this current level of mobility at home. Continue to recommend post acute STR once medically cleared for d/c from the acute care setting. Will continue skilled PT POC as able and appropriate to address functional impairments and progress towards therapy goals. Additional Treatment Day 1   End of Consult   Patient Position at End of Consult Bedside chair;Bed/Chair alarm activated; All needs within reach  (family in room)   End of Consult Comments Based on patient's [facility] Highest Level of Mobility scores today, patient currently has a goal of -[facility] Levels: 4: MOVE TO CHAIR/COMMODE, to be completed with RN staffing each shift, in order to improve overall activity tolerance and mobility, combat hospital related deconditioning, and maximize outcomes for d/c from the acute care setting.        Vitals:    07/30/23 1108 07/30/23 1114 07/30/23 1120   BP: 112/79 130/80 131/80   BP Location: Left arm Left arm Left arm   Pulse: 88 103 103   Position: Lying Sitting Standing   Symptoms: none none none   SpO2: 95% 3L 92% 3L 93% 3L     The patient's AM-PAC Basic Mobility Inpatient Short Form Raw Score is 7. A Raw score of less than or equal to 16 suggests the patient may benefit from discharge to post-acute rehabilitation services. Please also refer to the recommendation of the Physical Therapist for safe discharge planning.         Arleth Jacobo PT, DPT   Available via [employer]  NPI # [id number]  [place] License - [id number]  6/71/0230

## 2023-07-31 ENCOUNTER — APPOINTMENT (INPATIENT)
Dept: MRI IMAGING | Facility: HOSPITAL | Age: 38
DRG: 194 | End: 2023-07-31
Payer: COMMERCIAL

## 2023-07-31 PROBLEM — T68.XXXA HYPOTHERMIA: Status: RESOLVED | Noted: 2023-07-28 | Resolved: 2023-07-31

## 2023-07-31 PROBLEM — R65.10 SIRS (SYSTEMIC INFLAMMATORY RESPONSE SYNDROME) (HCC): Status: RESOLVED | Noted: 2023-07-29 | Resolved: 2023-07-31

## 2023-07-31 LAB
ANION GAP SERPL CALCULATED.3IONS-SCNC: 10 MMOL/L
ANION GAP SERPL CALCULATED.3IONS-SCNC: 10 MMOL/L
BUN SERPL-MCNC: 11 MG/DL (ref 5–25)
BUN SERPL-MCNC: 11 MG/DL (ref 5–25)
CALCIUM SERPL-MCNC: 9.7 MG/DL (ref 8.4–10.2)
CALCIUM SERPL-MCNC: 9.7 MG/DL (ref 8.4–10.2)
CHLORIDE SERPL-SCNC: 88 MMOL/L (ref 96–108)
CHLORIDE SERPL-SCNC: 88 MMOL/L (ref 96–108)
CO2 SERPL-SCNC: 33 MMOL/L (ref 21–32)
CO2 SERPL-SCNC: 33 MMOL/L (ref 21–32)
CREAT SERPL-MCNC: 0.74 MG/DL (ref 0.6–1.3)
CREAT SERPL-MCNC: 0.74 MG/DL (ref 0.6–1.3)
ERYTHROCYTE [DISTWIDTH] IN BLOOD BY AUTOMATED COUNT: 14.1 % (ref 11.6–15.1)
ERYTHROCYTE [DISTWIDTH] IN BLOOD BY AUTOMATED COUNT: 14.1 % (ref 11.6–15.1)
GFR SERPL CREATININE-BSD FRML MDRD: 118 ML/MIN/1.73SQ M
GFR SERPL CREATININE-BSD FRML MDRD: 118 ML/MIN/1.73SQ M
GLUCOSE SERPL-MCNC: 100 MG/DL (ref 65–140)
GLUCOSE SERPL-MCNC: 100 MG/DL (ref 65–140)
HCT VFR BLD AUTO: 38 % (ref 36.5–49.3)
HCT VFR BLD AUTO: 38 % (ref 36.5–49.3)
HGB BLD-MCNC: 12.6 G/DL (ref 12–17)
HGB BLD-MCNC: 12.6 G/DL (ref 12–17)
MCH RBC QN AUTO: 32.4 PG (ref 26.8–34.3)
MCH RBC QN AUTO: 32.4 PG (ref 26.8–34.3)
MCHC RBC AUTO-ENTMCNC: 33.2 G/DL (ref 31.4–37.4)
MCHC RBC AUTO-ENTMCNC: 33.2 G/DL (ref 31.4–37.4)
MCV RBC AUTO: 98 FL (ref 82–98)
MCV RBC AUTO: 98 FL (ref 82–98)
PLATELET # BLD AUTO: 197 THOUSANDS/UL (ref 149–390)
PLATELET # BLD AUTO: 197 THOUSANDS/UL (ref 149–390)
PMV BLD AUTO: 9.6 FL (ref 8.9–12.7)
PMV BLD AUTO: 9.6 FL (ref 8.9–12.7)
POTASSIUM SERPL-SCNC: 3.4 MMOL/L (ref 3.5–5.3)
POTASSIUM SERPL-SCNC: 3.4 MMOL/L (ref 3.5–5.3)
RBC # BLD AUTO: 3.89 MILLION/UL (ref 3.88–5.62)
RBC # BLD AUTO: 3.89 MILLION/UL (ref 3.88–5.62)
SODIUM SERPL-SCNC: 131 MMOL/L (ref 135–147)
SODIUM SERPL-SCNC: 131 MMOL/L (ref 135–147)
WBC # BLD AUTO: 8.52 THOUSAND/UL (ref 4.31–10.16)
WBC # BLD AUTO: 8.52 THOUSAND/UL (ref 4.31–10.16)

## 2023-07-31 PROCEDURE — 99232 SBSQ HOSP IP/OBS MODERATE 35: CPT | Performed by: INTERNAL MEDICINE

## 2023-07-31 PROCEDURE — A9585 GADOBUTROL INJECTION: HCPCS

## 2023-07-31 PROCEDURE — 70553 MRI BRAIN STEM W/O & W/DYE: CPT

## 2023-07-31 PROCEDURE — 85027 COMPLETE CBC AUTOMATED: CPT

## 2023-07-31 PROCEDURE — 80048 BASIC METABOLIC PNL TOTAL CA: CPT

## 2023-07-31 RX ORDER — POTASSIUM CHLORIDE 20 MEQ/1
40 TABLET, EXTENDED RELEASE ORAL 2 TIMES DAILY WITH MEALS
Status: COMPLETED | OUTPATIENT
Start: 2023-07-31 | End: 2023-07-31

## 2023-07-31 RX ORDER — GADOBUTROL 604.72 MG/ML
15 INJECTION INTRAVENOUS
Status: COMPLETED | OUTPATIENT
Start: 2023-07-31 | End: 2023-07-31

## 2023-07-31 RX ORDER — POTASSIUM CHLORIDE 20 MEQ/1
20 TABLET, EXTENDED RELEASE ORAL DAILY
Status: DISCONTINUED | OUTPATIENT
Start: 2023-08-01 | End: 2023-08-03

## 2023-07-31 RX ADMIN — ENOXAPARIN SODIUM 40 MG: 40 INJECTION SUBCUTANEOUS at 10:05

## 2023-07-31 RX ADMIN — FLUOXETINE 10 MG: 10 CAPSULE ORAL at 10:06

## 2023-07-31 RX ADMIN — QUETIAPINE FUMARATE 25 MG: 25 TABLET ORAL at 21:26

## 2023-07-31 RX ADMIN — POTASSIUM CHLORIDE 40 MEQ: 1500 TABLET, EXTENDED RELEASE ORAL at 14:04

## 2023-07-31 RX ADMIN — POTASSIUM CHLORIDE 40 MEQ: 1500 TABLET, EXTENDED RELEASE ORAL at 17:01

## 2023-07-31 RX ADMIN — TORSEMIDE 20 MG: 20 TABLET ORAL at 10:05

## 2023-07-31 RX ADMIN — GADOBUTROL 15 ML: 604.72 INJECTION INTRAVENOUS at 08:13

## 2023-07-31 RX ADMIN — ENOXAPARIN SODIUM 40 MG: 40 INJECTION SUBCUTANEOUS at 21:26

## 2023-07-31 RX ADMIN — POTASSIUM CHLORIDE 10 MEQ: 750 TABLET, EXTENDED RELEASE ORAL at 10:05

## 2023-07-31 NOTE — PROGRESS NOTES
[address] [facility]  Progress Note  Name: Anju White  MRN: [id number]  Unit/Bed#: S -Mainor I Date of Admission: 7/28/2023   Date of Service: 7/31/2023 I Hospital Day: 3    Assessment/Plan   * SIRS (systemic inflammatory response syndrome) (HCC)-resolved as of 7/31/2023  Assessment & Plan  · Met 2 out of 4 SIRS criteria initially (hypothermia, tachypnea), saturating mid 90s on 3 L of oxygen, was later tachycardia with soft blood pressures. · GCS 15. Oriented to self, place but not time or situation. Patient is nonverbal for the most part, just nods to questions. · Labs significant for sodium of 129, potassium 2.9, mildly elevated liver enzymes. · Lactic acid 2.1 --> 2.0. Normal WBC. Pro-Scott negative. UA negative. COVID-negative. · Physical exam findings, significant for wheezing in bilateral lung fields, prominent in the upper with decreased air movement. Unable to take deep breaths, rapid intermittent shallow breaths. 1+ bilateral lower extremity edema. · ED course: 1 L bolus of normal saline, 20 mEq of potassium. · Blood cultures -ve 48 hrs   · Source unclear, suspect most likely viral respiratory illness versus bacterial (Pro-Scott negative). · Plan:  · Monitor off antibiotics and IVF   · CT C/A/P w con ordered to evaluate for source of occult infection--f/u results  · Trend temperature     (HFpEF) heart failure with preserved ejection fraction (HCC)  Assessment & Plan  · Weight 140 kg at cardiology office visit 7/19/2023. · BNP 10. Chest x-ray did not show significant vascular congestion or pleural effusions. · On 3 L of oxygen   · Report of non-compliance with home medications   · Echo from 6/2/2023 revealed EF of 65%. No wall motion abnormalities. · Previously, 2-3+ pitting edema in the b/l LEs to upper shin   · Now, examines euvolemic. · Plan:  · Continue home regimen: Torsemide 20 mg daily, potassium 10 mg daily. · Recommend standing weights.   · I/Os · Encourage patient to be compliant with pharmacotherapy outpatient as prescribed. Fall  Assessment & Plan  · Patient is a poor historian, etiology/mechanism of fall unclear. · Trauma work-up at the ED was unremarkable for any acute traumatic injuries. · MRI brain - -ve for acute findings. Has chronic microangiopathic changes. · Pt continues to be minimally verbal. Unknown etiology. Family reports he has been like this over the last 3 to 4 months. · Plan:  · Follow-up with neurosurgery outpatient. · PT/OT -recommending postacute rehab  · Case management assisting with placement    Obesity hypoventilation syndrome ([address])  Assessment & Plan  · Patient denies snoring, PND. · VBG results: Respiratory acidosis with metabolic compensation. Normal pH. · Suspect could be due to obesity hypoventilation syndrome: BMI 42.78 or undiagnosed CROW   · Patient weaned off BiPAP on 7/29   · Currently on 6L O2 satting high 90s    · Plan:  ·  Continue to wean on O2   · Encourage patient to get sleep study outpatient. · BiPAP at bedtime    Testicular cancer [facility])  Assessment & Plan  · Patient's chemotherapy regimen cisplatin, etoposide (3/20/2023 - 5/26/2023), discontinued due to side effects (double vision, weakness on the right side, labile affect), duration of therapy unknown. · Positive family history of cancer in his father who had lung and bone cancer. · Plan:  · Follow-up with heme-onc outpatient. Bipolar I disorder (HCC)  Assessment & Plan  · Home regimen Seroquel 25 mg and fluoxetine 10 mg daily. Patient reports being compliant with medication but girlfriend disagrees. · Plan:  · Continue home medications. · Encourage patient to be compliant with pharmacotherapy outpatient. Hypokalemia  Assessment & Plan  · Potassium 2.8, Magnesium 1.8 on admission  · Patient was prescribed 10 mEq potassium daily outpatient but unsure about compliance. · Potassium this morning at 3.3;  Given 40 meq IV in addition to home med     · Plan  · Monitor BMP = K+ replace as needed  · Continue home potassium, now increased to 20 mEq daily    Hyponatremia  Assessment & Plan  · Sodium 128 on admission. · Etiology unclear, could be due to acute on chronic CHF, possible torsemide use, or SIADH secondary to psych meds (antipsychotics, SSRIs). Appears euvolemic on exam, suspect 2/2 hypervolemic hyonatremia d/t CHF. · Plan:  · home torsemide  · Monitor Na    Hypothermia-resolved as of 2023  Assessment & Plan  · Temperature POA 90.3  · Of note: Temperatures were initially taken by Sheppard probe (possible falsely low measurements due to misplaced probe or malfunctioning). Later, switched to oral temperature after discontinuing bear hugger. · Resolved. VTE Pharmacologic Prophylaxis: VTE Score: 6 High Risk (Score >/= 5) - Pharmacological DVT Prophylaxis Ordered: enoxaparin (Lovenox). Sequential Compression Devices Ordered. Patient Centered Rounds: I performed bedside rounds with nursing staff today. Discussions with Specialists or Other Care Team Provider: Nursing, case management    Education and Discussions with Family / Patient: Updated  (wife) at bedside. Current Length of Stay: 3 day(s)  Current Patient Status: Inpatient   Discharge Plan: Anticipate discharge in 24-48 hrs to rehab facility. Code Status: Level 1 - Full Code    Subjective:   No overnight events, reported by RN except for patient sleeping in chair. Patient denied all symptoms including shortness of breath, chest pain, abdominal pain, difficulty urinating after removal of Sheppard. Patient continues to be minimally verbal.    Objective:     Vitals:   Temp (24hrs), Av.1 °F (36.7 °C), Min:97.5 °F (36.4 °C), Max:98.5 °F (36.9 °C)    Temp:  [97.5 °F (36.4 °C)-98.5 °F (36.9 °C)] 98.1 °F (36.7 °C)  HR:  [79-92] 87  Resp:  [18-20] 18  BP: (111-124)/(76-86) 124/86  SpO2:  [93 %-97 %] 94 %  Body mass index is 41.08 kg/m².      Input and Output Summary (last 24 hours): Intake/Output Summary (Last 24 hours) at 7/31/2023 1743  Last data filed at 7/31/2023 0730  Gross per 24 hour   Intake --   Output 2200 ml   Net -2200 ml       Physical Exam:   Physical Exam  Vitals and nursing note reviewed. Constitutional:       General: He is not in acute distress. Appearance: Normal appearance. He is morbidly obese. He is not ill-appearing. Interventions: Nasal cannula in place. HENT:      Head: Normocephalic and atraumatic. Mouth/Throat:      Mouth: Mucous membranes are moist.      Pharynx: Oropharynx is clear. Eyes:      General: No scleral icterus. Conjunctiva/sclera: Conjunctivae normal.   Cardiovascular:      Rate and Rhythm: Normal rate and regular rhythm. Pulses: Normal pulses. Heart sounds: No murmur heard. No gallop. Pulmonary:      Effort: Pulmonary effort is normal. No respiratory distress. Breath sounds: Normal breath sounds. No wheezing or rales. Abdominal:      General: Bowel sounds are normal. There is no distension. Palpations: Abdomen is soft. There is no mass. Tenderness: There is no abdominal tenderness. Musculoskeletal:         General: No tenderness. Normal range of motion. Cervical back: Normal range of motion and neck supple. No tenderness. Right lower leg: No edema. Left lower leg: No edema. Skin:     General: Skin is warm and dry. Capillary Refill: Capillary refill takes less than 2 seconds. Neurological:      Mental Status: He is alert and oriented to person, place, and time. Mental status is at baseline. Sensory: No sensory deficit.    Psychiatric:         Mood and Affect: Mood normal.         Behavior: Behavior normal.          Additional Data:     Labs:  Results from last 7 days   Lab Units 07/31/23  0432 07/29/23  0433 07/28/23  1855   WBC Thousand/uL 8.52   < > 5.17   HEMOGLOBIN g/dL 12.6   < > 13.7   HEMATOCRIT % 38.0   < > 39.8   PLATELETS Thousands/uL 197   < > 228   NEUTROS PCT %  --   --  74   LYMPHS PCT %  --   --  16   MONOS PCT %  --   --  8   EOS PCT %  --   --  1    < > = values in this interval not displayed. Results from last 7 days   Lab Units 07/31/23  0432 07/29/23  1427 07/29/23  0433   SODIUM mmol/L 131*   < > 131*   POTASSIUM mmol/L 3.4*   < > 3.2*   CHLORIDE mmol/L 88*   < > 87*   CO2 mmol/L 33*   < > 35*   BUN mg/dL 11   < > 10   CREATININE mg/dL 0.74   < > 0.78   ANION GAP mmol/L 10   < > 9   CALCIUM mg/dL 9.7   < > 9.0   ALBUMIN g/dL  --   --  3.9   TOTAL BILIRUBIN mg/dL  --   --  0.46   ALK PHOS U/L  --   --  118*   ALT U/L  --   --  156*   AST U/L  --   --  77*   GLUCOSE RANDOM mg/dL 100   < > 91    < > = values in this interval not displayed. Results from last 7 days   Lab Units 07/29/23  0155 07/28/23  2314 07/28/23  2101   LACTIC ACID mmol/L 2.0 2.1*  --    PROCALCITONIN ng/ml  --   --  0.09       Lines/Drains:  Invasive Devices     Peripheral Intravenous Line  Duration           Peripheral IV 07/28/23 Left Hand 2 days    Peripheral IV 07/28/23 Right Antecubital 2 days                      Imaging: Reviewed radiology reports from this admission including: MRI brain and Personally reviewed the following imaging: MRI brain    Recent Cultures (last 7 days):   Results from last 7 days   Lab Units 07/29/23  0047   BLOOD CULTURE  No Growth at 48 hrs. No Growth at 48 hrs.        Last 24 Hours Medication List:   Current Facility-Administered Medications   Medication Dose Route Frequency Provider Last Rate   • albuterol  2 puff Inhalation Q4H PRN Arnaldo Jauregui DO     • albuterol  2.5 mg Nebulization Q4H PRN Bradly Pierce DO     • enoxaparin  40 mg Subcutaneous Q12H [facility] Nakul Hernandez MD     • FLUoxetine  10 mg Oral Daily Arnaldo Jauregui DO     • [START ON 8/1/2023] potassium chloride  20 mEq Oral Daily Patel Dotson MD     • QUEtiapine  25 mg Oral HS Arnaldo Jauregui DO     • torsemide  20 mg Oral Daily Ephraim Yoder MD          Today, Patient Was Seen By: Edith Mesa MD    **Please Note: This note may have been constructed using a voice recognition system. **

## 2023-07-31 NOTE — ASSESSMENT & PLAN NOTE
· Potassium 2.8, Magnesium 1.8 on admission  · Patient was prescribed 10 mEq potassium daily outpatient but unsure about compliance. · Potassium this morning at 3.3;  Given 40 meq IV in addition to home med     · Plan  · Monitor BMP = K+ replace as needed  · Continue home potassium, now increased to 20 mEq daily

## 2023-07-31 NOTE — ASSESSMENT & PLAN NOTE
· Home regimen Seroquel 25 mg and fluoxetine 10 mg daily. Patient reports being compliant with medication but girlfriend disagrees. · Plan:  · Continue home medications. · Encourage patient to be compliant with pharmacotherapy outpatient.

## 2023-07-31 NOTE — ASSESSMENT & PLAN NOTE
· Patient is a poor historian, etiology/mechanism of fall unclear. · Trauma work-up at the ED was unremarkable for any acute traumatic injuries. · MRI brain - -ve for acute findings. Has chronic microangiopathic changes. · Pt continues to be minimally verbal. Unknown etiology. Family reports he has been like this over the last 3 to 4 months. · Plan:  · Follow-up with neurosurgery outpatient.   · PT/OT -recommending postacute rehab  · Case management assisting with placement

## 2023-07-31 NOTE — ASSESSMENT & PLAN NOTE
· Weight 140 kg at cardiology office visit 7/19/2023. · BNP 10. Chest x-ray did not show significant vascular congestion or pleural effusions. · On 3 L of oxygen   · Report of non-compliance with home medications   · Echo from 6/2/2023 revealed EF of 65%. No wall motion abnormalities. · Previously, 2-3+ pitting edema in the b/l LEs to upper shin   · Now, examines euvolemic. · Plan:  · Continue home regimen: Torsemide 20 mg daily, potassium 10 mg daily. · Recommend standing weights. · I/Os   · Encourage patient to be compliant with pharmacotherapy outpatient as prescribed.

## 2023-07-31 NOTE — ASSESSMENT & PLAN NOTE
· Patient denies snoring, PND. · VBG results: Respiratory acidosis with metabolic compensation. Normal pH. · Suspect could be due to obesity hypoventilation syndrome: BMI 42.78 or undiagnosed CROW   · Patient weaned off BiPAP on 7/29   · Currently on 6L O2 satting high 90s    · Plan:  ·  Continue to wean on O2   · Encourage patient to get sleep study outpatient.   · BiPAP at bedtime

## 2023-07-31 NOTE — ASSESSMENT & PLAN NOTE
· Sodium 128 on admission. · Etiology unclear, could be due to acute on chronic CHF, possible torsemide use, or SIADH secondary to psych meds (antipsychotics, SSRIs). Appears euvolemic on exam, suspect 2/2 hypervolemic hyonatremia d/t CHF.     · Plan:  · home torsemide  · Monitor Na

## 2023-07-31 NOTE — ASSESSMENT & PLAN NOTE
· Met 2 out of 4 SIRS criteria initially (hypothermia, tachypnea), saturating mid 90s on 3 L of oxygen, was later tachycardia with soft blood pressures. · GCS 15. Oriented to self, place but not time or situation. Patient is nonverbal for the most part, just nods to questions. · Labs significant for sodium of 129, potassium 2.9, mildly elevated liver enzymes. · Lactic acid 2.1 --> 2.0. Normal WBC. Pro-Scott negative. UA negative. COVID-negative. · Physical exam findings, significant for wheezing in bilateral lung fields, prominent in the upper with decreased air movement. Unable to take deep breaths, rapid intermittent shallow breaths. 1+ bilateral lower extremity edema. · ED course: 1 L bolus of normal saline, 20 mEq of potassium. · Blood cultures -ve 48 hrs   · Source unclear, suspect most likely viral respiratory illness versus bacterial (Pro-Scott negative).      · Plan:  · Monitor off antibiotics and IVF   · CT C/A/P w con ordered to evaluate for source of occult infection--f/u results  · Trend temperature

## 2023-08-01 LAB
ANION GAP SERPL CALCULATED.3IONS-SCNC: 6 MMOL/L
ANION GAP SERPL CALCULATED.3IONS-SCNC: 6 MMOL/L
BUN SERPL-MCNC: 12 MG/DL (ref 5–25)
BUN SERPL-MCNC: 12 MG/DL (ref 5–25)
CALCIUM SERPL-MCNC: 9.7 MG/DL (ref 8.4–10.2)
CALCIUM SERPL-MCNC: 9.7 MG/DL (ref 8.4–10.2)
CHLORIDE SERPL-SCNC: 90 MMOL/L (ref 96–108)
CHLORIDE SERPL-SCNC: 90 MMOL/L (ref 96–108)
CO2 SERPL-SCNC: 38 MMOL/L (ref 21–32)
CO2 SERPL-SCNC: 38 MMOL/L (ref 21–32)
CREAT SERPL-MCNC: 0.69 MG/DL (ref 0.6–1.3)
CREAT SERPL-MCNC: 0.69 MG/DL (ref 0.6–1.3)
GFR SERPL CREATININE-BSD FRML MDRD: 122 ML/MIN/1.73SQ M
GFR SERPL CREATININE-BSD FRML MDRD: 122 ML/MIN/1.73SQ M
GLUCOSE SERPL-MCNC: 95 MG/DL (ref 65–140)
GLUCOSE SERPL-MCNC: 95 MG/DL (ref 65–140)
POTASSIUM SERPL-SCNC: 3.6 MMOL/L (ref 3.5–5.3)
POTASSIUM SERPL-SCNC: 3.6 MMOL/L (ref 3.5–5.3)
SODIUM SERPL-SCNC: 134 MMOL/L (ref 135–147)
SODIUM SERPL-SCNC: 134 MMOL/L (ref 135–147)

## 2023-08-01 PROCEDURE — 99232 SBSQ HOSP IP/OBS MODERATE 35: CPT | Performed by: INTERNAL MEDICINE

## 2023-08-01 PROCEDURE — 80048 BASIC METABOLIC PNL TOTAL CA: CPT

## 2023-08-01 RX ORDER — POTASSIUM CHLORIDE 20 MEQ/1
40 TABLET, EXTENDED RELEASE ORAL
Status: COMPLETED | OUTPATIENT
Start: 2023-08-01 | End: 2023-08-01

## 2023-08-01 RX ORDER — TORSEMIDE 20 MG/1
20 TABLET ORAL ONCE
Status: COMPLETED | OUTPATIENT
Start: 2023-08-01 | End: 2023-08-01

## 2023-08-01 RX ADMIN — POTASSIUM CHLORIDE 20 MEQ: 1500 TABLET, EXTENDED RELEASE ORAL at 08:50

## 2023-08-01 RX ADMIN — TORSEMIDE 20 MG: 20 TABLET ORAL at 08:45

## 2023-08-01 RX ADMIN — QUETIAPINE FUMARATE 25 MG: 25 TABLET ORAL at 21:25

## 2023-08-01 RX ADMIN — ENOXAPARIN SODIUM 40 MG: 40 INJECTION SUBCUTANEOUS at 08:45

## 2023-08-01 RX ADMIN — ENOXAPARIN SODIUM 40 MG: 40 INJECTION SUBCUTANEOUS at 21:25

## 2023-08-01 RX ADMIN — FLUOXETINE 10 MG: 10 CAPSULE ORAL at 08:51

## 2023-08-01 RX ADMIN — POTASSIUM CHLORIDE 40 MEQ: 1500 TABLET, EXTENDED RELEASE ORAL at 13:51

## 2023-08-01 RX ADMIN — TORSEMIDE 20 MG: 20 TABLET ORAL at 13:52

## 2023-08-01 NOTE — ASSESSMENT & PLAN NOTE
· Patient denies snoring, PND. · VBG results: Respiratory acidosis with metabolic compensation. Normal pH.    · Suspect could be due to obesity hypoventilation syndrome: BMI 42.78 or undiagnosed CROW   · Patient weaned off BiPAP on 7/29   · Currently On Room air   · Ordered BiPAP at bedtime while inpatient- however, refusing     · Plan:  · Referral to Pulmonolgy at discharge sent, Sleep study as outpatient

## 2023-08-01 NOTE — PROGRESS NOTES
[address] [facility]  Progress Note  Name: Anju White  MRN: [id number]  Unit/Bed#: S -Mainor I Date of Admission: 7/28/2023   Date of Service: 8/1/2023 I Hospital Day: 4    Assessment/Plan   * SIRS (systemic inflammatory response syndrome) (HCC)-resolved as of 7/31/2023  Assessment & Plan  · Met SIRS criteria initially (hypothermia, tachypnea, tachycardic. · Labs without leukocytosis. Lactic acid 2.1 --> 2.0. Normal WBC. Pro-Scott negative. UA negative. COVID-negative. · Blood cultures -ve to date   · Source unclear, suspect most likely viral respiratory illness versus bacterial.   · Remains afebrile and HD stable     · Plan:  · Monitor off antibiotics and IVF   · Trend temperature     (HFpEF) heart failure with preserved ejection fraction (HCC)  Assessment & Plan  · Weight 140 kg at cardiology office visit 7/19/2023. · BNP 10. Chest x-ray did not show significant vascular congestion or pleural effusions. · Report of non-compliance with home medications   · Echo from 6/2/2023 revealed EF of 65%. No wall motion abnormalities. · Previously, 2-3+ pitting edema in the b/l LEs to upper shin   · Now, examines overloaded, with pitting edema, no rales. Placed on 1 L O2 from 2 L. · Plan:  · Continue home regimen: Torsemide 20 mg daily, potassium now INC to 20 mg daily. · Give an additional dose of Torsemide 20 mg once this afternoon   · Recommend standing weights. · I/Os   · AM BMP   · Encourage patient to be compliant with pharmacotherapy outpatient as prescribed. Fall  Assessment & Plan  · Patient is a poor historian, etiology/mechanism of fall unclear. · Trauma work-up at the ED was unremarkable for any acute traumatic injuries. · MRI brain - -ve for acute findings. Has chronic microangiopathic changes. · Pt continues to be minimally verbal. Unknown etiology. Family reports he has been like this over the last 3 to 4 months.     · Plan:  · Follow-up with neurosurgery outpatient. · PT/OT -recommending postacute rehab  · Case management assisting with placement    Obesity hypoventilation syndrome ([address])  Assessment & Plan  · Patient denies snoring, PND. · VBG results: Respiratory acidosis with metabolic compensation. Normal pH. · Suspect could be due to obesity hypoventilation syndrome: BMI 42.78 or undiagnosed CROW   · Patient weaned off BiPAP on 7/29   · Currently on 6L O2 satting high 90s    · Plan:  · Continue to wean on O2   · Encourage patient to get sleep study outpatient. · BiPAP at bedtime - however, refusing inpatient   · Referral to Pulmonolgy at discharge, Sleep study as outpatient     Testicular cancer [facility])  Assessment & Plan  · Patient's chemotherapy regimen cisplatin, etoposide (3/20/2023 - 5/26/2023), discontinued due to side effects (double vision, weakness on the right side, labile affect), duration of therapy unknown. · Positive family history of cancer in his father who had lung and bone cancer. · Plan:  · Follow-up with heme-onc outpatient. Bipolar I disorder (HCC)  Assessment & Plan  · Home regimen Seroquel 25 mg and fluoxetine 10 mg daily. Patient reports being compliant with medication but girlfriend disagrees. · Plan:  · Continue home medications. · Encourage patient to be compliant with pharmacotherapy outpatient. Hypokalemia  Assessment & Plan  · Potassium 2.8, Magnesium 1.8 on admission  · Patient was prescribed 10 mEq potassium daily outpatient but unsure about compliance. · Potassium this morning at 3.3; Given 40 meq IV in addition to home med     · Plan  · Monitor BMP = K+ replace as needed  · Continue home potassium, cont increased to 20 mEq daily    Hyponatremia  Assessment & Plan  · Sodium 128 on admission. · Etiology unclear, could be due to acute on chronic CHF, possible torsemide use, or SIADH secondary to psych meds (antipsychotics, SSRIs).  Appears euvolemic on exam, suspect 2/2 hypervolemic hyonatremia d/t CHF.    · Plan:  · home torsemide  · Monitor Na    Hypothermia-resolved as of 2023  Assessment & Plan  · Temperature POA 90.3  · Of note: Temperatures were initially taken by Sheppard probe (possible falsely low measurements due to misplaced probe or malfunctioning). Later, switched to oral temperature after discontinuing bear hugger. · Resolved. VTE Pharmacologic Prophylaxis: VTE Score: 6 High Risk (Score >/= 5) - Pharmacological DVT Prophylaxis Ordered: enoxaparin (Lovenox). Sequential Compression Devices Ordered. Patient Centered Rounds: I performed bedside rounds with nursing staff today. Discussions with Specialists or Other Care Team Provider: Nursing, case management    Education and Discussions with Family / Patient: Updated  (significant other) via phone. Current Length of Stay: 4 day(s)  Current Patient Status: Inpatient   Discharge Plan: Anticipate discharge in 24-48 hrs to rehab facility. Pending placement. Code Status: Level 1 - Full Code    Subjective:   No overnight events, reported by RN. Patient denied all symptoms including shortness of breath, chest pain, abdominal pain, difficulty urinating after removal of Sheppard. Patient continues to be minimally verbal.     Objective:     Vitals:   Temp (24hrs), Av.7 °F (36.5 °C), Min:97.5 °F (36.4 °C), Max:97.8 °F (36.6 °C)    Temp:  [97.5 °F (36.4 °C)-97.8 °F (36.6 °C)] 97.8 °F (36.6 °C)  HR:  [79-97] 94  Resp:  [18] 18  BP: (104-119)/(67-77) 104/70  SpO2:  [91 %-95 %] 93 %  Body mass index is 41.08 kg/m². Input and Output Summary (last 24 hours):   No intake or output data in the 24 hours ending 23 1551    Physical Exam:   Physical Exam  Vitals and nursing note reviewed. Constitutional:       General: He is not in acute distress. Appearance: Normal appearance. He is morbidly obese. He is not ill-appearing. Interventions: Nasal cannula in place. HENT:      Head: Normocephalic and atraumatic. Mouth/Throat:      Mouth: Mucous membranes are moist.      Pharynx: Oropharynx is clear. Eyes:      General: No scleral icterus. Conjunctiva/sclera: Conjunctivae normal.   Cardiovascular:      Rate and Rhythm: Normal rate and regular rhythm. Pulses: Normal pulses. Heart sounds: No murmur heard. No gallop. Pulmonary:      Effort: Pulmonary effort is normal. No respiratory distress. Breath sounds: Wheezing present. No rales. Abdominal:      General: Bowel sounds are normal. There is no distension. Palpations: Abdomen is soft. There is no mass. Tenderness: There is no abdominal tenderness. Musculoskeletal:         General: No tenderness. Normal range of motion. Cervical back: Normal range of motion and neck supple. No tenderness. Right lower le+ Pitting Edema present. Left lower le+ Pitting Edema present. Skin:     General: Skin is warm and dry. Capillary Refill: Capillary refill takes less than 2 seconds. Neurological:      Mental Status: He is alert and oriented to person, place, and time. Mental status is at baseline. Sensory: No sensory deficit. Psychiatric:         Mood and Affect: Mood normal.         Behavior: Behavior normal.         Additional Data:     Labs:  Results from last 7 days   Lab Units 23  0432 23  0433 23  1855   WBC Thousand/uL 8.52   < > 5.17   HEMOGLOBIN g/dL 12.6   < > 13.7   HEMATOCRIT % 38.0   < > 39.8   PLATELETS Thousands/uL 197   < > 228   NEUTROS PCT %  --   --  74   LYMPHS PCT %  --   --  16   MONOS PCT %  --   --  8   EOS PCT %  --   --  1    < > = values in this interval not displayed.      Results from last 7 days   Lab Units 23  0442 23  1427 23  0433   SODIUM mmol/L 134*   < > 131*   POTASSIUM mmol/L 3.6   < > 3.2*   CHLORIDE mmol/L 90*   < > 87*   CO2 mmol/L 38*   < > 35*   BUN mg/dL 12   < > 10   CREATININE mg/dL 0.69   < > 0.78   ANION GAP mmol/L 6   < > 9 CALCIUM mg/dL 9.7   < > 9.0   ALBUMIN g/dL  --   --  3.9   TOTAL BILIRUBIN mg/dL  --   --  0.46   ALK PHOS U/L  --   --  118*   ALT U/L  --   --  156*   AST U/L  --   --  77*   GLUCOSE RANDOM mg/dL 95   < > 91    < > = values in this interval not displayed. Results from last 7 days   Lab Units 07/29/23  0155 07/28/23  2314 07/28/23  2101   LACTIC ACID mmol/L 2.0 2.1*  --    PROCALCITONIN ng/ml  --   --  0.09       Lines/Drains:  Invasive Devices     Peripheral Intravenous Line  Duration           Peripheral IV 08/01/23 Distal;Left;Upper;Ventral (anterior) Arm <1 day                Imaging: No pertinent imaging reviewed. Recent Cultures (last 7 days):   Results from last 7 days   Lab Units 07/29/23  0047   BLOOD CULTURE  No Growth at 72 hrs. No Growth at 72 hrs. Last 24 Hours Medication List:   Current Facility-Administered Medications   Medication Dose Route Frequency Provider Last Rate   • albuterol  2 puff Inhalation Q4H PRN Arnaldo Jauregui DO     • albuterol  2.5 mg Nebulization Q4H PRN Jorge Dan DO     • enoxaparin  40 mg Subcutaneous Q12H [address] Romano MD     • FLUoxetine  10 mg Oral Daily Arnaldo Jauregui, DO     • potassium chloride  20 mEq Oral Daily Vandana Rice MD     • QUEtiapine  25 mg Oral HS Basyong Jackpa, DO     • torsemide  20 mg Oral Daily Thien Corona MD          Today, Patient Was Seen By: Vandana Rice MD    **Please Note: This note may have been constructed using a voice recognition system. **

## 2023-08-01 NOTE — ASSESSMENT & PLAN NOTE
· Sodium 128 on admission. · Etiology unclear, could be due to acute on chronic CHF, possible torsemide use, or SIADH secondary to psych meds (antipsychotics, SSRIs). Appears euvolemic on exam, suspect 2/2 hypervolemic hyonatremia d/t CHF. · DEC today, likely higher intake of fluids from foods/liquids brought from home by family. Even though patient is being FR inpt. · Plan:  · Continue with fluid restriction.   Counseled to avoid INC fluids intake

## 2023-08-01 NOTE — CASE MANAGEMENT
Case Management Progress Note    Patient name Bryan Carballo  Location S /S -01 MRN [id number]  : 1985 Date 2023       LOS (days): 4  Geometric Mean LOS (GMLOS) (days):   Days to GMLOS:        OBJECTIVE:        Current admission status: Inpatient  Preferred Pharmacy: No Pharmacies Listed  Primary Care Provider: Nacho Morales MD    Primary Insurance: [address] [place] [address]  Secondary Insurance:     PROGRESS NOTE:  CM called and let the Pt's first emergency contact Abilio Steward a message requesting a call back for the completion of the Pt's assessment, and to discuss d/c plans.

## 2023-08-01 NOTE — ASSESSMENT & PLAN NOTE
· Met SIRS criteria initially (hypothermia, tachypnea, tachycardic. · Labs without leukocytosis. Lactic acid 2.1 --> 2.0. Normal WBC. Pro-Scott negative. UA negative. COVID-negative.   · Blood cultures -ve to date   · Source unclear, suspect most likely viral respiratory illness versus bacterial.   · Remains afebrile and HD stable   · Resolved

## 2023-08-01 NOTE — ASSESSMENT & PLAN NOTE
· Potassium 2.8, Magnesium 1.8 on admission  · Patient was prescribed 10 mEq potassium daily outpatient but unsure about compliance.    · Potassium stable at 3.5 goal is >4      · Plan  · Continue 40 mEq of potassium daily  · BMP in 1 week after discharge

## 2023-08-01 NOTE — ASSESSMENT & PLAN NOTE
· Patient is a poor historian, etiology/mechanism of fall unclear. · Trauma work-up at the ED was unremarkable for any acute traumatic injuries. · MRI brain - -ve for acute findings. Has chronic microangiopathic changes. · Pt continues to be minimally verbal. Unknown etiology. Family reports he has been like this over the last 3 to 4 months. · Plan:  · Follow-up with neurosurgery outpatient.   · PT/OT -recommending postacute rehab  · Case management assisting [facility]

## 2023-08-01 NOTE — ASSESSMENT & PLAN NOTE
· Volume overload on presentation  · Weight 140 kg at cardiology office visit 7/19/2023. · BNP 10. Chest x-ray did not show significant vascular congestion or pleural effusions. · Report of non-compliance with home medications per family. · Echo from 6/2/2023 revealed EF of 65%. No wall motion abnormalities. · Previously, 2-3+ pitting edema in the b/l LEs to upper shin   · Current on room air. Chest clear to auscultation. 1+ LE pitting edema. · Continues to have diet indiscretions with high salt snacks at bedside. · Plan:  · Continue regimen: Torsemide 30 mg daily, continue potassium 40 mg daily. · Daily standing weights. · Encourage patient to be compliant with pharmacotherapy outpatient as prescribed. Also discussed diet recommendations with patient's mother. Counseled on avoiding high salt meals.   · Check BMP 1 week after DC   · Outpatient follow-up with cardiology

## 2023-08-01 NOTE — CASE MANAGEMENT
Case Management Assessment & Discharge Planning Note    Patient name Jessica Thomson  Location S /S -01 MRN [id number]  : 1985 Date 2023       Current Admission Date: 2023  Current Admission Diagnosis:Fall   Patient Active Problem List    Diagnosis Date Noted   • Testicular cancer ([address]) 2023   • Obesity hypoventilation syndrome ([address]) 2023   • Fall 2023   • Hyponatremia 2023   • Hypokalemia 2023   • Bipolar I disorder ([address]) 2023   • (HFpEF) heart failure with preserved ejection fraction ([address]) 2023      LOS (days): 4  Geometric Mean LOS (GMLOS) (days):   Days to GMLOS:     OBJECTIVE:    Risk of Unplanned Readmission Score: 11.66         Current admission status: Inpatient       Preferred Pharmacy: No Pharmacies Listed  Primary Care Provider: Tomasz Mena MD    Primary Insurance: [facility]  Secondary Insurance:     ASSESSMENT:  [address] - Significant Other   Primary Phone: [phone] (Mobile)                         Readmission Root Cause  30 Day Readmission: No    Patient Information  Admitted from[de-identified] Home  Mental Status: Confused  During Assessment patient was accompanied by: Other-Comment Jeanette Gee)  Assessment information provided by[de-identified] Other - please comment Jeanette Gee)  Primary Caregiver: Self  Support Systems: Spouse/significant other, Parent  [place] of Residence: [address] do you live in?: [facility] entry access options.  Select all that apply.: Stairs  Number of steps to enter home.: 7  Do the steps have railings?: Yes  Type of Current Residence: 2 story home  Upon entering residence, is there a bedroom on the main floor (no further steps)?: No  A bedroom is located on the following floor levels of residence (select all that apply):: 2nd Floor  Upon entering residence, is there a bathroom on the main floor (no further steps)?: No  Indicate which floors of current residence have a bathroom (select all the apply):: 2nd Floor  Number of steps to 2nd floor from main floor: One Flight  In the last 12 months, was there a time when you were not able to pay the mortgage or rent on time?: No  In the last 12 months, how many places have you lived?: 1  In the last 12 months, was there a time when you did not have a steady place to sleep or slept in a shelter (including now)?: No  Homeless/housing insecurity resource given?: N/A  Living Arrangements: Lives w/ Spouse/significant other  Is patient a ?: No    Activities of Daily Living Prior to Admission  Functional Status: Independent  Completes ADLs independently?: Yes  Ambulates independently?: Yes  Does patient use assisted devices?: No  Does patient currently own DME?: No  Does patient have a history of Outpatient Therapy (PT/OT)?: No  Does the patient have a history of Short-Term Rehab?: No  Does patient have a history of HHC?: No  Does patient currently have [address] [facility]?: No         Patient Information Continued  Income Source: Employed  Does patient have prescription coverage?: Yes  Within the past 12 months, you worried that your food would run out before you got the money to buy more.: Never true  Within the past 12 months, the food you bought just didn't last and you didn't have money to get more.: Never true  Food insecurity resource given?: N/A  Does patient receive dialysis treatments?: No  Does patient have a history of substance abuse?: No  Does patient have a history of Mental Health Diagnosis?: No    PHQ 2/9 Screening   Reviewed PHQ 2/9 Depression Screening Score?: No    Means of Transportation  Means of Transport to Appts[de-identified] Drives Self  In the past 12 months, has lack of transportation kept you from medical appointments or from getting medications?: No  In the past 12 months, has lack of transportation kept you from meetings, work, or from getting things needed for daily living?: No  Was application for public transport provided?: N/A        DISCHARGE DETAILS:    Discharge planning discussed with[de-identified] Pramod  Freedom of Choice: Yes  Comments - Freedom of Choice: Yusef Medina reported being agreeable to STR and a blanket referral being made. CM contacted family/caregiver?: Yes  Were Treatment Team discharge recommendations reviewed with patient/caregiver?: Yes  Did patient/caregiver verbalize understanding of patient care needs?: Yes  Were patient/caregiver advised of the risks associated with not following Treatment Team discharge recommendations?: Yes    Contacts  Patient Contacts: Yusef Medina  Relationship to Patient[de-identified] Family  Contact Method: In Person  Reason/Outcome: Referral    Requested [address]         Is the patient interested in [address] [place] [address] [place] at discharge?: No    DME Referral Provided  Referral made for DME?: No    Other Referral/Resources/Interventions Provided:  Interventions: Short Term Rehab, Acute Rehab  Referral Comments: [facility] [place] referral made to both SNF and Acute level of rehab.          Treatment Team Recommendation: Short Term Rehab  Discharge Destination Plan[de-identified] Short Term Rehab

## 2023-08-02 LAB
ANION GAP SERPL CALCULATED.3IONS-SCNC: 8 MMOL/L
ANION GAP SERPL CALCULATED.3IONS-SCNC: 8 MMOL/L
BUN SERPL-MCNC: 13 MG/DL (ref 5–25)
BUN SERPL-MCNC: 13 MG/DL (ref 5–25)
CALCIUM SERPL-MCNC: 9.4 MG/DL (ref 8.4–10.2)
CALCIUM SERPL-MCNC: 9.4 MG/DL (ref 8.4–10.2)
CHLORIDE SERPL-SCNC: 88 MMOL/L (ref 96–108)
CHLORIDE SERPL-SCNC: 88 MMOL/L (ref 96–108)
CO2 SERPL-SCNC: 37 MMOL/L (ref 21–32)
CO2 SERPL-SCNC: 37 MMOL/L (ref 21–32)
CREAT SERPL-MCNC: 0.79 MG/DL (ref 0.6–1.3)
CREAT SERPL-MCNC: 0.79 MG/DL (ref 0.6–1.3)
GFR SERPL CREATININE-BSD FRML MDRD: 115 ML/MIN/1.73SQ M
GFR SERPL CREATININE-BSD FRML MDRD: 115 ML/MIN/1.73SQ M
GLUCOSE SERPL-MCNC: 89 MG/DL (ref 65–140)
GLUCOSE SERPL-MCNC: 89 MG/DL (ref 65–140)
POTASSIUM SERPL-SCNC: 3.5 MMOL/L (ref 3.5–5.3)
POTASSIUM SERPL-SCNC: 3.5 MMOL/L (ref 3.5–5.3)
SODIUM SERPL-SCNC: 133 MMOL/L (ref 135–147)
SODIUM SERPL-SCNC: 133 MMOL/L (ref 135–147)

## 2023-08-02 PROCEDURE — 80048 BASIC METABOLIC PNL TOTAL CA: CPT

## 2023-08-02 PROCEDURE — 97530 THERAPEUTIC ACTIVITIES: CPT

## 2023-08-02 PROCEDURE — 94760 N-INVAS EAR/PLS OXIMETRY 1: CPT

## 2023-08-02 PROCEDURE — 97535 SELF CARE MNGMENT TRAINING: CPT

## 2023-08-02 PROCEDURE — 94640 AIRWAY INHALATION TREATMENT: CPT

## 2023-08-02 PROCEDURE — 99232 SBSQ HOSP IP/OBS MODERATE 35: CPT | Performed by: INTERNAL MEDICINE

## 2023-08-02 PROCEDURE — 94664 DEMO&/EVAL PT USE INHALER: CPT

## 2023-08-02 PROCEDURE — 97116 GAIT TRAINING THERAPY: CPT

## 2023-08-02 PROCEDURE — 97110 THERAPEUTIC EXERCISES: CPT

## 2023-08-02 RX ORDER — POTASSIUM CHLORIDE 20 MEQ/1
40 TABLET, EXTENDED RELEASE ORAL
Status: COMPLETED | OUTPATIENT
Start: 2023-08-02 | End: 2023-08-02

## 2023-08-02 RX ORDER — TORSEMIDE 20 MG/1
20 TABLET ORAL ONCE
Status: COMPLETED | OUTPATIENT
Start: 2023-08-02 | End: 2023-08-02

## 2023-08-02 RX ADMIN — TORSEMIDE 20 MG: 20 TABLET ORAL at 13:31

## 2023-08-02 RX ADMIN — ALBUTEROL SULFATE 2.5 MG: 2.5 SOLUTION RESPIRATORY (INHALATION) at 13:56

## 2023-08-02 RX ADMIN — ENOXAPARIN SODIUM 40 MG: 40 INJECTION SUBCUTANEOUS at 21:13

## 2023-08-02 RX ADMIN — ENOXAPARIN SODIUM 40 MG: 40 INJECTION SUBCUTANEOUS at 10:00

## 2023-08-02 RX ADMIN — POTASSIUM CHLORIDE 20 MEQ: 1500 TABLET, EXTENDED RELEASE ORAL at 10:00

## 2023-08-02 RX ADMIN — QUETIAPINE FUMARATE 25 MG: 25 TABLET ORAL at 21:13

## 2023-08-02 RX ADMIN — ALBUTEROL SULFATE 2 PUFF: 90 AEROSOL, METERED RESPIRATORY (INHALATION) at 10:02

## 2023-08-02 RX ADMIN — FLUOXETINE 10 MG: 10 CAPSULE ORAL at 10:00

## 2023-08-02 RX ADMIN — POTASSIUM CHLORIDE 40 MEQ: 1500 TABLET, EXTENDED RELEASE ORAL at 13:31

## 2023-08-02 RX ADMIN — TORSEMIDE 20 MG: 20 TABLET ORAL at 10:00

## 2023-08-02 NOTE — PLAN OF CARE
Problem: OCCUPATIONAL THERAPY ADULT  Goal: Performs self-care activities at highest level of function for planned discharge setting. See evaluation for individualized goals. Description: Treatment Interventions: ADL retraining, Functional transfer training, Endurance training, Cognitive reorientation, Patient/family training, Equipment evaluation/education, Compensatory technique education, Continued evaluation, Energy conservation, Activityengagement          See flowsheet documentation for full assessment, interventions and recommendations. Outcome: Progressing  Note: Limitation: Decreased ADL status, Decreased UE ROM, Decreased Safe judgement during ADL, Decreased cognition, Decreased endurance, Decreased self-care trans, Decreased high-level ADLs (balance, act michael, GM coord, fxnl reach, standing michael, strength and fxnl sitting balance, direction following, safety awareness, insight, pacing, problem solving, learning new tasks, initiation and appropriate responses , display of emotion, response time)  Prognosis: Good  Assessment: Pt agreeable to OT treatment session, upon arrival patient was found supine in bed. Patient participated in skilled OT interventions to address ADL tasks, functional transfers, and overall activity tolerance and participation. In comparison to previous session, patient with significant improvements in participation and performance of ADLs this session. Pt able to tolerate full ADL this AM with increased attention, initiation and sequencing. Pt able to tolerate 3 minutes of standing at sink x 2 trials for grooming/bathing task. Pt continues to be limited by SOB and fatigue throughout session but highly motivated to participate. Patient to benefit from continued IPOT treatment to address deficits as defined above and maximize level of functional independence with ADLs and functional mobility.      OT Discharge Recommendation: Post acute rehabilitation services     Nash Luke OTGERALD, OTR/L  [place] License [id number]  [address] [place], [place] [id number]

## 2023-08-02 NOTE — CASE MANAGEMENT
Case Management Progress Note    Patient name Kristine Left  Location S /S -01 MRN [id number]  : 1985 Date 2023       LOS (days): 5  Geometric Mean LOS (GMLOS) (days):   Days to GMLOS:        OBJECTIVE:        Current admission status: Inpatient  Preferred Pharmacy: No Pharmacies Listed  Primary Care Provider: Hollis Finn MD    Primary Insurance: [address] [employer] [facility]  Secondary Insurance:     PROGRESS NOTE:  CM was in contact with Pt's LITZY Paulson to discuss the Pt's readiness for d/c, and to review the current accepting SNF for her first preference. Jayneoris Anastacio reported she would like to wait for a response from [facility] prior to giving a back SNF. CM will continue to follow.

## 2023-08-02 NOTE — RESPIRATORY THERAPY NOTE
Pt refused bipap for 2x nights and in uninterested in using. Removed unit from pt room and dc order.

## 2023-08-02 NOTE — OCCUPATIONAL THERAPY NOTE
Occupational Therapy Progress Note     Patient Name: Luis Kendrick  FIVQU'E Date: 8/2/2023  Problem List  Active Problems:    Fall    Hyponatremia    Hypokalemia    Bipolar I disorder ([address])    (HFpEF) heart failure with preserved ejection fraction (HCC)    Testicular cancer ([address])    Obesity hypoventilation syndrome ([address])        08/02/23 0848   OT Last Visit   OT Visit Date 08/02/23   Note Type   Note Type Treatment for insurance authorization   Pain Assessment   Pain Assessment Tool 0-10   Pain Score No Pain   Restrictions/Precautions   Weight Bearing Precautions Per Order No   Other Precautions Cognitive; Chair Alarm; Bed Alarm;Multiple lines; Fall Risk  (2L O2 NC)   Lifestyle   Autonomy Pt lives with family in a 2 level house, (I) with ADLs, (A) with IADLs, no AD, (+) falls, (-)    Reciprocal Relationships Supportive sig other and family   Service to Others Unemployed. Was working in [employer] however has not been able to wrok for past year due to medical issues. Intrinsic Gratification Pt enjoys football and watching the [employer]   ADL   Where Assessed Standing at sink   Eating Assistance 5  Supervision/Setup   Eating Deficit Setup; Beverage management   Eating Comments setup for breakfast sitting in recliner   Grooming Assistance 5  Supervision/Setup   Grooming Deficit Setup;Verbal cueing;Supervision/safety; Increased time to complete;Standing with assistive device; Teeth care   Grooming Comments standing at sink, VC for problem solving task, pacing, safety in stance. Increased WOB   UB Bathing Assistance 5  Supervision/Setup   UB Bathing Deficit Setup;Verbal cueing;Supervision/safety; Increased time to complete; Chest;Right arm;Left arm; Abdomen   UB Bathing Comments sitting at sink after setup, VC for thoroughness.  Pt continues to require increased time for processing as well as bradykinesia   LB Bathing Assistance 4  Minimal Assistance   LB Bathing Deficit Setup;Steadying;Verbal cueing;Supervision/safety; Increased time to complete;Perineal area; Buttocks   LB Bathing Comments in stance at sink, min A to wash buttocks for thouroughness, min A to steady, VC for BLE positioning   UB Dressing Assistance 5  Supervision/Setup   UB Dressing Deficit Setup;Verbal cueing;Supervision/safety; Increased time to complete; Thread RUE; Thread LUE;Pull around back   UB Dressing Comments sitting at sink, VC for orientation, increased time   LB Dressing Assistance 2  Maximal Assistance   LB Dressing Deficit Don/doff R sock; Don/doff L sock   LB Dressing Comments sitting in recliner   Toileting Assistance  4  Minimal Assistance   Toileting Deficit Setup;Steadying;Verbal cueing; Increased time to complete;Supervison/safety; Perineal hygiene   Toileting Comments Pt incontinent of bladder upon arrival, VC to initiate and perform lenora care   Functional Standing Tolerance   Time 3 minutes x 2 trials   Activity grooming/bathing at sink   Comments intermittent unilateral support on sinktop vs RW; occasional VC for BLE positioning as pt flexes at knees w/ fatigue   Bed Mobility   Supine to Sit 3  Moderate assistance   Additional items Assist x 1;HOB elevated; Bedrails; Increased time required;Verbal cues;LE management   Additional Comments Able to sit with (S) EOB. OOB to recliner at end of session   Transfers   Sit to Stand 5  Supervision   Additional items Assist x 1; Increased time required;Verbal cues;Armrests   Stand to Sit 5  Supervision   Additional items Assist x 1; Armrests; Increased time required;Verbal cues   Toilet transfer 5  Supervision   Additional items Assist x 1; Armrests; Increased time required;Verbal cues; Commode   Additional Comments Initially required mod A x 2 to perform STS from EOB, improving to (S) for remainder of trials from recliner vs BSC with VC for safe hand placement, body mechanics and positioning.  Pt required consistent VC, limtied carryover   Functional Mobility   Functional Mobility 4  Minimal assistance   Additional Comments Initially required min A x 2 to take few steps from EOB>recliner, improving to min A x 1 with RW short household distance to bathroom and back . SpO2 remained above 88% throughout all mobility/activity, VC for deep breathing   Additional items Rolling walker   Toilet Transfers   Toilet Transfer From Rolling walker   Toilet Transfer Type To and from   Toilet Transfer to Extra wide bedside commode   Toilet Transfer Technique Ambulating   Toilet Transfers Contact guard;Verbal cues   Toilet Transfers Comments VC for safe hand placement   Subjective   Subjective "I'm ready"; pt more interactive today   Cognition   Overall Cognitive Status Impaired   Arousal/Participation Alert; Cooperative   Attention Attends with cues to redirect   Orientation Level Oriented to person;Oriented to place;Oriented to situation;Disoriented to time  (pt reports month is June 2023; generally oriented to situation)   Memory Decreased short term memory;Decreased recall of recent events;Decreased recall of precautions   Following Commands Follows one step commands with increased time or repetition   Comments Increased attention, interaction, reponses today. Pt continues to respond with 1-2 word answers however demonstrated emotion today, smiling and making eye contact   Activity Tolerance   Activity Tolerance Patient limited by fatigue  (SOB)   Medical Staff [id number] coordinated with PT Zhen rojas to see pt and udpated   Assessment   Assessment Pt agreeable to OT treatment session, upon arrival patient was found supine in bed. Patient participated in skilled OT interventions to address ADL tasks, functional transfers, and overall activity tolerance and participation. In comparison to previous session, patient with significant improvements in participation and performance of ADLs this session. Pt able to tolerate full ADL this AM with increased attention, initiation and sequencing.  Pt able to tolerate 3 minutes of standing at sink x 2 trials for grooming/bathing task. Pt continues to be limited by SOB and fatigue throughout session but highly motivated to participate. Patient to benefit from continued IPOT treatment to address deficits as defined above and maximize level of functional independence with ADLs and functional mobility. Plan   Treatment Interventions ADL retraining;Functional transfer training; Endurance training;Cognitive reorientation;Patient/family training;Equipment evaluation/education; Compensatory technique education;Continued evaluation; Energy conservation; Activityengagement   Goal Expiration Date 08/09/23   OT Treatment Day 1   OT Frequency 3-5x/wk   Recommendation   OT Discharge Recommendation Post acute rehabilitation services   Additional Comments  This session, pt required and most appropriately benefited from skilled OT/PT co-treat due to extensive physical assistance of SKILLED therapists, significant regression from functional baseline and decreased activity tolerance. OT and PT goals were addressed separately as seen in documentation. Additional Comments 2 The patient's raw score on the AM-PAC Daily Activity inpatient short form is 18, standardized score is 38.66, less than 39.4. From an OT standpoint, patients at this level may benefit from d/c to Post acute rehabilitation services.    AM-PAC Daily Activity Inpatient   Lower Body Dressing 2   Bathing 3   Toileting 3   Upper Body Dressing 3   Grooming 3   Eating 4   Daily Activity Raw Score 18   Daily Activity Standardized Score (Calc for Raw Score >=11) 38.66   AM-PAC Applied Cognition Inpatient   Following a Speech/Presentation 2   Understanding Ordinary Conversation 3   Taking Medications 1   Remembering Where Things Are Placed or Put Away 3   Remembering List of 4-5 Errands 2   Taking Care of Complicated Tasks 2   Applied Cognition Raw Score 13   Applied Cognition Standardized Score 30.46   End of Consult   Patient Position at End of Consult Bedside chair; All needs within reach;Bed/Chair alarm activated   Nurse Communication Nurse aware of consult     GOALS:     *Goals established to promote patient goal to return to functional baseline:       *ADL transfers with Min (A) x 2 for inc'd independence with ADLs/purposeful tasks (MET: UPGRADE TO MOD (I))     *Patient will perform grooming tasks sitting EOB with (S) in order to increase overall independence and fine motor control (MET: UPGRADE TO MOD (I)) STANDING AT SINK)     *UB ADL with (S) for inc'd independence with self care and gross motor control/motor planning (MET: UPGRADE TO MOD (I))     *LB ADL with Mod (A) using AE prn for inc'd independence with self care and increased functional reach (NOT PROGRESSING)     *Toileting with Mod (A) for clothing management and hygiene to increase hygiene/thoroughness in order to reduce caregiver burden (MET: UPGRADE TO (S))     *Increase stand tolerance x 2  m for inc'd tolerance with standing purposeful tasks (MET: UPGRADE TO 5-7 MINUTES)     *Participate in 10m UE therex to increase overall stamina/activity tolerance for purposeful tasks     *Bed mobility- Min (A) for inc'd independence to manage own comfort and initiate EOB & OOB purposeful tasks (PROGRESSING)     *Patient will verbalize 3 safety awareness/ principles to prevent falls in the home setting. *Patient will verbalize and demonstrate use of energy conservation/deep breathing techniques and work simplification skills during functional activities with no verbal cues. (PROGRESSING)     *Patient will increase OOB/sitting tolerance to 2-4 hours per day to increase participation in self-care and leisure tasks with no s/s of exertion. *Patient will engage in ongoing cognitive assessment to assist with safe discharge planning/recommendations.       *Patient will increase functional mobility to and from [facility] with rolling walker with min assist x 2 to increase independence with toileting (MET: UPGRADE TO (S))     *Patient will improve functional activity tolerance to 20 minutes of sustained functional tasks to increase participation in basic self-care and decrease assistance level.  (MET: UPGRADE TO 39 MINUTES)     *Pt will consistently follow one step directions during ADL performance w/ 50-75% accuracy to max I and safety w/ ADL performance (MET: UPGRADE TO MULTI-STEP COMMANDS)     PARKER Narvaez, OTR/L     [place] License [id number]  [address] [place], [place] [id number]

## 2023-08-02 NOTE — ARC ADMISSION
Reviewed case with [facility] physician. Patient has been deemed not appropriate for [facility]C at this time. Recommending slower paced therapies. CM has been updated.

## 2023-08-02 NOTE — PROGRESS NOTES
[facility]  Progress Note  Name: Julian Rodriguez  MRN: [id number]  Unit/Bed#: S -01 I Date of Admission: 7/28/2023   Date of Service: 8/2/2023 I Hospital Day: 5    Assessment/Plan   * (HFpEF) heart failure with preserved ejection fraction [facility])  Assessment & Plan  · Volume overload on presentation  · Weight 140 kg at cardiology office visit 7/19/2023. · BNP 10. Chest x-ray did not show significant vascular congestion or pleural effusions. · Report of non-compliance with home medications per family. · Echo from 6/2/2023 revealed EF of 65%. No wall motion abnormalities. · Previously, 2-3+ pitting edema in the b/l LEs to upper shin   · Current additional dose of 20 mg torsemide yesterday, still examines overloaded, with pitting edema, no rales. On 2L O2 satting upper 90s. · Plan:  · Continue home regimen: Torsemide 20 mg daily, continue potassium INC to 20 mg daily. · Give an additional dose of Torsemide 20 mg once this afternoon 8/2    · Recommend standing weights. · I/Os   · AM BMP   · Encourage patient to be compliant with pharmacotherapy outpatient as prescribed. Fall  Assessment & Plan  · Patient is a poor historian, etiology/mechanism of fall unclear. · Trauma work-up at the ED was unremarkable for any acute traumatic injuries. · MRI brain - -ve for acute findings. Has chronic microangiopathic changes. · Pt continues to be minimally verbal. Unknown etiology. Family reports he has been like this over the last 3 to 4 months. · Plan:  · Follow-up with neurosurgery outpatient. · PT/OT -recommending postacute rehab  · Case management assisting with placement    SIRS (systemic inflammatory response syndrome) (HCC)-resolved as of 7/31/2023  Assessment & Plan  · Met SIRS criteria initially (hypothermia, tachypnea, tachycardic. · Labs without leukocytosis. Lactic acid 2.1 --> 2.0. Normal WBC. Pro-Scott negative. UA negative. COVID-negative.   · Blood cultures -ve to date   · Source unclear, suspect most likely viral respiratory illness versus bacterial.   · Remains afebrile and HD stable     · Plan:  · Monitor off antibiotics and IVF   · Trend temperature     Obesity hypoventilation syndrome (HCC)  Assessment & Plan  · Patient denies snoring, PND. · VBG results: Respiratory acidosis with metabolic compensation. Normal pH. · Suspect could be due to obesity hypoventilation syndrome: BMI 42.78 or undiagnosed CROW   · Patient weaned off BiPAP on 7/29   · Currently on 6L O2 satting high 90s    · Plan:  · Continue to wean on O2   · Encourage patient to get sleep study outpatient. · BiPAP at bedtime - however, refusing inpatient   · Referral to Pulmonolgy at discharge, Sleep study as outpatient     Testicular cancer [facility])  Assessment & Plan  · Patient's chemotherapy regimen cisplatin, etoposide (3/20/2023 - 5/26/2023), discontinued due to side effects (double vision, weakness on the right side, labile affect), duration of therapy unknown. · Positive family history of cancer in his father who had lung and bone cancer. · Plan:  · Follow-up with heme-onc outpatient. Bipolar I disorder (HCC)  Assessment & Plan  · Home regimen Seroquel 25 mg and fluoxetine 10 mg daily. Patient reports being compliant with medication but girlfriend disagrees. · Plan:  · Continue home medications. · Encourage patient to be compliant with pharmacotherapy outpatient. Hypokalemia  Assessment & Plan  · Potassium 2.8, Magnesium 1.8 on admission  · Patient was prescribed 10 mEq potassium daily outpatient but unsure about compliance. · Potassium this morning at 3.3; Given 40 meq IV in addition to home med     · Plan  · Monitor BMP = K+ replace as needed  · Continue home potassium, cont increased to 20 mEq daily    Hyponatremia  Assessment & Plan  · Sodium 128 on admission.   · Etiology unclear, could be due to acute on chronic CHF, possible torsemide use, or SIADH secondary to psych meds (antipsychotics, SSRIs). Appears euvolemic on exam, suspect 2/2 hypervolemic hyonatremia d/t CHF. · Stable    · Plan:  · home torsemide  · Monitor Na    Hypothermia-resolved as of 2023  Assessment & Plan  · Temperature POA 90.3  · Of note: Temperatures were initially taken by Sheppard probe (possible falsely low measurements due to misplaced probe or malfunctioning). Later, switched to oral temperature after discontinuing bear hugger. · Resolved. VTE Pharmacologic Prophylaxis: VTE Score: 6 High Risk (Score >/= 5) - Pharmacological DVT Prophylaxis Ordered: enoxaparin (Lovenox). Sequential Compression Devices Ordered. Patient Centered Rounds: I performed bedside rounds with nursing staff today. Discussions with Specialists or Other Care Team Provider: Nursing, case management    Education and Discussions with Family / Patient: Updated  (significant other) via phone. Current Length of Stay: 5 day(s)  Current Patient Status: Inpatient   Discharge Plan: Anticipate discharge in 24-48 hrs to rehab facility. Pending placement. Patient is stable for discharge. Code Status: Level 1 - Full Code    Subjective:   No overnight events, reported by RN. Patient denied all symptoms including shortness of breath, chest pain, abdominal pain, difficulty urinating. Has no complaints. Patient continues to be minimally verbal.     Objective:     Vitals:   Temp (24hrs), Av.8 °F (36.6 °C), Min:97.6 °F (36.4 °C), Max:98.2 °F (36.8 °C)    Temp:  [97.6 °F (36.4 °C)-98.2 °F (36.8 °C)] 97.6 °F (36.4 °C)  HR:  [] 91  Resp:  [18-19] 18  BP: (112-130)/(74-86) 112/78  SpO2:  [90 %-95 %] 92 %  Body mass index is 41.08 kg/m². Input and Output Summary (last 24 hours): Intake/Output Summary (Last 24 hours) at 2023 1523  Last data filed at 2023 1334  Gross per 24 hour   Intake 300 ml   Output --   Net 300 ml       Physical Exam:   Physical Exam  Vitals and nursing note reviewed. Constitutional:       General: He is not in acute distress. Appearance: Normal appearance. He is morbidly obese. He is not ill-appearing. Interventions: Nasal cannula in place. HENT:      Head: Normocephalic and atraumatic. Mouth/Throat:      Mouth: Mucous membranes are moist.      Pharynx: Oropharynx is clear. Eyes:      General: No scleral icterus. Conjunctiva/sclera: Conjunctivae normal.   Cardiovascular:      Rate and Rhythm: Normal rate and regular rhythm. Pulses: Normal pulses. Heart sounds: No murmur heard. No gallop. Pulmonary:      Effort: Pulmonary effort is normal. No respiratory distress. Breath sounds: No wheezing or rales. Abdominal:      General: Bowel sounds are normal. There is no distension. Palpations: Abdomen is soft. There is no mass. Tenderness: There is no abdominal tenderness. Musculoskeletal:         General: No tenderness. Normal range of motion. Cervical back: Normal range of motion and neck supple. No tenderness. Right lower le+ Pitting Edema present. Left lower le+ Pitting Edema present. Skin:     General: Skin is warm and dry. Capillary Refill: Capillary refill takes less than 2 seconds. Neurological:      Mental Status: He is alert and oriented to person, place, and time. Mental status is at baseline. Sensory: No sensory deficit. Psychiatric:         Mood and Affect: Mood normal.         Behavior: Behavior normal.         Additional Data:     Labs:  Results from last 7 days   Lab Units 23  0432 23  0433 23  1855   WBC Thousand/uL 8.52   < > 5.17   HEMOGLOBIN g/dL 12.6   < > 13.7   HEMATOCRIT % 38.0   < > 39.8   PLATELETS Thousands/uL 197   < > 228   NEUTROS PCT %  --   --  74   LYMPHS PCT %  --   --  16   MONOS PCT %  --   --  8   EOS PCT %  --   --  1    < > = values in this interval not displayed.      Results from last 7 days   Lab Units 23  0555 23  1428 07/29/23  0433   SODIUM mmol/L 133*   < > 131*   POTASSIUM mmol/L 3.5   < > 3.2*   CHLORIDE mmol/L 88*   < > 87*   CO2 mmol/L 37*   < > 35*   BUN mg/dL 13   < > 10   CREATININE mg/dL 0.79   < > 0.78   ANION GAP mmol/L 8   < > 9   CALCIUM mg/dL 9.4   < > 9.0   ALBUMIN g/dL  --   --  3.9   TOTAL BILIRUBIN mg/dL  --   --  0.46   ALK PHOS U/L  --   --  118*   ALT U/L  --   --  156*   AST U/L  --   --  77*   GLUCOSE RANDOM mg/dL 89   < > 91    < > = values in this interval not displayed. Results from last 7 days   Lab Units 07/29/23  0155 07/28/23  2314 07/28/23  2101   LACTIC ACID mmol/L 2.0 2.1*  --    PROCALCITONIN ng/ml  --   --  0.09       Lines/Drains:  Invasive Devices     Peripheral Intravenous Line  Duration           Peripheral IV 08/01/23 Distal;Left;Upper;Ventral (anterior) Arm 1 day                Imaging: No pertinent imaging reviewed. Recent Cultures (last 7 days):   Results from last 7 days   Lab Units 07/29/23  0047   BLOOD CULTURE  No Growth After 4 Days. No Growth After 4 Days. Last 24 Hours Medication List:   Current Facility-Administered Medications   Medication Dose Route Frequency Provider Last Rate   • albuterol  2 puff Inhalation Q4H PRN Basanta Juventino, DO     • albuterol  2.5 mg Nebulization Q4H PRN Tom Jordan DO     • enoxaparin  40 mg Subcutaneous Q12H [facility] Chas Hanna MD     • FLUoxetine  10 mg Oral Daily Basanta Juventino, DO     • potassium chloride  20 mEq Oral Daily Joey Varela MD     • QUEtiapine  25 mg Oral HS Basanta Juventino, DO     • torsemide  20 mg Oral Daily Cleve Smith MD          Today, Patient Was Seen By: Joey Varela MD    **Please Note: This note may have been constructed using a voice recognition system. **

## 2023-08-02 NOTE — PLAN OF CARE
Problem: PHYSICAL THERAPY ADULT  Goal: Performs mobility at highest level of function for planned discharge setting. See evaluation for individualized goals. Description: Treatment/Interventions: Functional transfer training, LE strengthening/ROM, Therapeutic exercise, Endurance training, Cognitive reorientation, Patient/family training, Equipment eval/education, Bed mobility, Gait training, Spoke to MD, Spoke to nursing (PT to see for elevations assessment and goal creation when appropriate)    Equipment Recommended:  (TBD pending progress)     See flowsheet documentation for full assessment, interventions and recommendations. Outcome: Progressing  Note: Prognosis: Fair  Problem List: Decreased range of motion, Decreased strength, Decreased endurance, Impaired balance, Decreased mobility, Impaired judgement, Decreased safety awareness, Impaired sensation, Obesity  Assessment: pt began tx session lying supine in the bed and was agreeable to participate in PT intervention. Care coordination with OT Maria M due to pt anticipated level of assistanec for bed mobility and all OOB transfers and mobility. pt demonstrated progress throughout tx session as pt was able to complete a supine<>sit EOB transfers with mod Ax1. pt did require LE and HHA in order to sit EOB from supine. pt was able to sit EOB w/o LOB while being educated on all functional transfers and hand placement. pt was able to complete multiple functional transfers to and from RW w/ a decrease in level of assistance required compared to previous tx sessions /s with VC's for hand placement in order to increase safety and balance. pt was able to increase ambulation tolerance/distance as pt ambulated 32'x1 , 15'x2 , and 5'x1 all with RW and min Ax1 no LOB.  pt was able to increase activity/standing tolerance as pt stood at the sink 3 minutes x2 while completing ADL's as pt only required VC's for straightening out his knees as pt did demonstrate slight flexed posture w/ bilateral knees while standing. Continue to recommend post acute rehab services at [facility] tiem of D/C in order to maximize pt functional independence and safety with all OOB mobility. Post tx session pt in recliner with chair alarm activated, set up for breakfast and all pt needs met  Barriers to Discharge: Inaccessible home environment, Decreased caregiver support     PT Discharge Recommendation: Post acute rehabilitation services    See flowsheet documentation for full assessment.

## 2023-08-02 NOTE — PHYSICAL THERAPY NOTE
PHYSICAL THERAPY NOTE          Patient Name: Lm MATHEWS Date: 8/2/2023 08/02/23 0845   PT Last Visit   PT Visit Date 08/02/23   Note Type   Note Type Treatment   Pain Assessment   Pain Assessment Tool 0-10   Pain Score No Pain   Hospital Pain Intervention(s) Repositioned; Ambulation/increased activity; Emotional support; Rest   Multiple Pain Sites No   Pain Rating: FLACC (Rest) - Face 0   Pain Rating: FLACC (Rest) - Legs 0   Pain Rating: FLACC (Rest) - Activity 0   Pain Rating: FLACC (Rest) - Cry 0   Pain Rating: FLACC (Rest) - Consolability 0   Score: FLACC (Rest) 0   Restrictions/Precautions   Weight Bearing Precautions Per Order No   Other Precautions Cognitive; Bed Alarm; Chair Alarm; Fall Risk  (2L NC 02)   General   Chart Reviewed Yes   Response to Previous Treatment Patient with no complaints from previous session. Family/Caregiver Present No   Cognition   Overall Cognitive Status Impaired   Arousal/Participation Alert; Responsive; Cooperative   Attention Attends with cues to redirect   Orientation Level Oriented to person;Oriented to place;Oriented to situation   Memory Decreased short term memory;Decreased recall of recent events;Decreased recall of precautions   Following Commands Follows one step commands with increased time or repetition   Comments pt was pleasant , cooperative and demonstrated more emotion compared to previous tx sessions   Subjective   Subjective pt was agreeable to participate in PT intervention. pt also stated he feels stronger today then past days   Bed Mobility   Supine to Sit 3  Moderate assistance   Additional items Assist x 1;HOB elevated; Bedrails; Increased time required;Verbal cues;LE management; Other  (HHA)   Sit to Supine Unable to assess   Additional Comments pt seated OOB in the recliner post tx session with call bell, chair alarm activated and set up for breakfast Transfers   Sit to Stand 5  Supervision   Additional items Assist x 1; Increased time required;Verbal cues  (VC's for hand placement while ascending to RW and descending back into recliner)   Stand to Sit 5  Supervision   Additional items Assist x 1; Armrests; Increased time required;Verbal cues; Other  (VC's for hand placement and RW use)   Stand pivot 5  Supervision   Additional items Armrests; Increased time required;Verbal cues  (w/ RW)   Toilet transfer 5  Supervision   Additional items Assist x 1; Armrests; Increased time required;Commode   Additional Comments pt continues to require RW fro all functional transfers with VC's for hand placement in order to increase safety and balance   Ambulation/Elevation   Gait pattern Improper Weight shift;Decreased foot clearance; Excessively slow; Foward flexed;Decreased hip extension;Decreased heel strike;Decreased toe off   Gait Assistance 4  Minimal assist   Additional items Assist x 1;Verbal cues   Assistive Device Rolling walker   Distance 5"x1 32'x1 15'x2 RW min Ax1   Stair Management Assistance Not tested   Ambulation/Elevation Additional Comments pt was able to increase static/dynamic standing balance and tolerance as pt stood at the sink 3 minutes x2 while performing ADL's   Balance   Static Sitting Fair +   Dynamic Sitting Fair   Static Standing Fair -   Dynamic Standing Poor +   Ambulatory Poor +  (w/ RW)   Endurance Deficit   Endurance Deficit Yes   Endurance Deficit Description pt limited with activity tolerance and required therapeutic [facility]   Activity Tolerance   Activity Tolerance Patient limited by fatigue   Nurse Made Aware Spoke to RN   Exercises   Hip Abduction Sitting;10 reps;AROM; Bilateral   Hip Adduction Sitting;10 reps;AROM; Bilateral  (pillow squeezes)   Knee AROM Long Arc Quad Sitting;15 reps;AROM; Bilateral   Ankle Pumps Sitting;15 reps;AROM; Bilateral   Assessment   Prognosis Fair   Problem List Decreased range of motion;Decreased strength;Decreased endurance; Impaired balance;Decreased mobility; Impaired judgement;Decreased safety awareness; Impaired sensation;Obesity   Assessment pt began tx session lying supine in the bed and was agreeable to participate in PT intervention. Care coordination with OT Maria M due to pt anticipated level of assistanec for bed mobility and all OOB transfers and mobility. pt demonstrated progress throughout tx session as pt was able to complete a supine<>sit EOB transfers with mod Ax1. pt did require LE and HHA in order to sit EOB from supine. pt was able to sit EOB w/o LOB while being educated on all functional transfers and hand placement. pt was able to complete multiple functional transfers to and from RW w/ a decrease in level of assistance required compared to previous tx sessions /s with VC's for hand placement in order to increase safety and balance. pt was able to increase ambulation tolerance/distance as pt ambulated 32'x1 , 15'x2 , and 5'x1 all with RW and min Ax1 no LOB. pt was able to increase activity/standing tolerance as pt stood at the sink 3 minutes x2 while completing ADL's as pt only required VC's for straightening out his knees as pt did demonstrate slight flexed posture w/ bilateral knees while standing. [facility] order to maximize pt functional independence and safety with all OOB mobility. Post tx session pt in recliner with chair alarm activated, set up for breakfast and all pt needs met   Barriers to Discharge Inaccessible home environment;Decreased caregiver support   Goals   Patient Goals none stated this tx session   STG Expiration Date 08/09/23   PT Treatment Day 2   Plan   Treatment/Interventions Functional transfer training;LE strengthening/ROM; Therapeutic exercise; Endurance training;Cognitive reorientation;Patient/family training;Equipment eval/education; Bed mobility;Gait training;Spoke to nursing;OT Progress Slow progress, decreased activity tolerance   PT Frequency 3-5x/wk   Recommendation   PT Discharge Recommendation Post acute rehabilitation services   AM-PAC Basic Mobility Inpatient   Turning in Flat Bed Without Bedrails 2   Lying on Back to Sitting on Edge of Flat Bed Without Bedrails 2   Moving Bed to Chair 3   Standing Up From Chair Using Arms 3   Walk in Room 3   Climb 3-5 Stairs With Railing 1   Basic Mobility Inpatient Raw Score 14   Basic Mobility Standardized Score 35.55   Highest Level Of Mobility   -HLM Goal 4: Move to chair/commode   JH-HLM Achieved 7: Walk 25 feet or more   Education   Education Provided Mobility training;Assistive device   Patient Reinforcement needed   End of Consult   Patient Position at End of Consult Bedside chair;Bed/Chair alarm activated; All needs within reach   The patient's AM-PAC Basic Mobility Inpatient Short Form Raw Score is 14. A Raw score of less than or equal to 16 suggests the patient may benefit from discharge to post-acute rehabilitation services. Please also refer to the recommendation of the Physical Therapist for safe discharge planning.      Bharti Goldberg

## 2023-08-02 NOTE — PLAN OF CARE
Problem: MOBILITY - ADULT  Goal: Maintain or return to baseline ADL function  Description: INTERVENTIONS:  -  Assess patient's ability to carry out ADLs; assess patient's baseline for ADL function and identify physical deficits which impact ability to perform ADLs (bathing, care of mouth/teeth, toileting, grooming, dressing, etc.)  - Assess/evaluate cause of self-care deficits   - Assess range of motion  - Assess patient's mobility; develop plan if impaired  - Assess patient's need for assistive devices and provide as appropriate  - Encourage maximum independence but intervene and supervise when necessary  - Involve family in performance of ADLs  - Assess for home care needs following discharge   - Consider OT consult to assist with ADL evaluation and planning for discharge  - Provide patient education as appropriate  Outcome: Progressing  Goal: Maintains/Returns to pre admission functional level  Description: INTERVENTIONS:  - Perform BMAT or MOVE assessment daily.   - Set and communicate daily mobility goal to care team and patient/family/caregiver.    - Collaborate with rehabilitation services on mobility goals if consulted  - Out of bed for toileting  - Record patient progress and toleration of activity level   Outcome: Progressing     Problem: Prexisting or High Potential for Compromised Skin Integrity  Goal: Skin integrity is maintained or improved  Description: INTERVENTIONS:  - Identify patients at risk for skin breakdown  - Assess and monitor skin integrity  - Assess and monitor nutrition and hydration status  - Monitor labs   - Assess for incontinence   - Turn and reposition patient  - Assist with mobility/ambulation  - Relieve pressure over bony prominences  - Avoid friction and shearing  - Provide appropriate hygiene as needed including keeping skin clean and dry  - Evaluate need for skin moisturizer/barrier cream  - Collaborate with interdisciplinary team   - Patient/family teaching  - Consider wound care consult   Outcome: Progressing     Problem: Nutrition/Hydration-ADULT  Goal: Nutrient/Hydration intake appropriate for improving, restoring or maintaining nutritional needs  Description: Monitor and assess patient's nutrition/hydration status for malnutrition. Collaborate with interdisciplinary team and initiate plan and interventions as ordered. Monitor patient's weight and dietary intake as ordered or per policy. Utilize nutrition screening tool and intervene as necessary. Determine patient's food preferences and provide high-protein, high-caloric foods as appropriate.      INTERVENTIONS:  - Monitor oral intake, urinary output, labs, and treatment plans  - Assess nutrition and hydration status and recommend course of action  - Evaluate amount of meals eaten  - Assist patient with eating if necessary   - Allow adequate time for meals  - Recommend/ encourage appropriate diets, oral nutritional supplements, and vitamin/mineral supplements  - Order, calculate, and assess calorie counts as needed  - Recommend, monitor, and adjust tube feedings based on assessed needs  - Assess need for intravenous fluids  - Provide nutrition/hydration education as appropriate  - Include patient/family/caregiver in decisions related to nutrition  Outcome: Progressing

## 2023-08-03 LAB
ANION GAP SERPL CALCULATED.3IONS-SCNC: 8 MMOL/L
ANION GAP SERPL CALCULATED.3IONS-SCNC: 8 MMOL/L
BACTERIA BLD CULT: NORMAL
BUN SERPL-MCNC: 13 MG/DL (ref 5–25)
BUN SERPL-MCNC: 13 MG/DL (ref 5–25)
CALCIUM SERPL-MCNC: 9.6 MG/DL (ref 8.4–10.2)
CALCIUM SERPL-MCNC: 9.6 MG/DL (ref 8.4–10.2)
CHLORIDE SERPL-SCNC: 88 MMOL/L (ref 96–108)
CHLORIDE SERPL-SCNC: 88 MMOL/L (ref 96–108)
CO2 SERPL-SCNC: 38 MMOL/L (ref 21–32)
CO2 SERPL-SCNC: 38 MMOL/L (ref 21–32)
CREAT SERPL-MCNC: 0.85 MG/DL (ref 0.6–1.3)
CREAT SERPL-MCNC: 0.85 MG/DL (ref 0.6–1.3)
GFR SERPL CREATININE-BSD FRML MDRD: 112 ML/MIN/1.73SQ M
GFR SERPL CREATININE-BSD FRML MDRD: 112 ML/MIN/1.73SQ M
GLUCOSE SERPL-MCNC: 94 MG/DL (ref 65–140)
GLUCOSE SERPL-MCNC: 94 MG/DL (ref 65–140)
POTASSIUM SERPL-SCNC: 3.4 MMOL/L (ref 3.5–5.3)
POTASSIUM SERPL-SCNC: 3.4 MMOL/L (ref 3.5–5.3)
SODIUM SERPL-SCNC: 134 MMOL/L (ref 135–147)
SODIUM SERPL-SCNC: 134 MMOL/L (ref 135–147)

## 2023-08-03 PROCEDURE — 80048 BASIC METABOLIC PNL TOTAL CA: CPT

## 2023-08-03 PROCEDURE — 97116 GAIT TRAINING THERAPY: CPT

## 2023-08-03 PROCEDURE — 99232 SBSQ HOSP IP/OBS MODERATE 35: CPT | Performed by: INTERNAL MEDICINE

## 2023-08-03 PROCEDURE — 97110 THERAPEUTIC EXERCISES: CPT

## 2023-08-03 RX ORDER — POTASSIUM CHLORIDE 20 MEQ/1
40 TABLET, EXTENDED RELEASE ORAL DAILY
Status: DISCONTINUED | OUTPATIENT
Start: 2023-08-03 | End: 2023-08-05 | Stop reason: HOSPADM

## 2023-08-03 RX ADMIN — FLUOXETINE 10 MG: 10 CAPSULE ORAL at 08:53

## 2023-08-03 RX ADMIN — QUETIAPINE FUMARATE 25 MG: 25 TABLET ORAL at 21:28

## 2023-08-03 RX ADMIN — TORSEMIDE 20 MG: 20 TABLET ORAL at 08:53

## 2023-08-03 RX ADMIN — ENOXAPARIN SODIUM 40 MG: 40 INJECTION SUBCUTANEOUS at 08:54

## 2023-08-03 RX ADMIN — ALBUTEROL SULFATE 2 PUFF: 90 AEROSOL, METERED RESPIRATORY (INHALATION) at 08:59

## 2023-08-03 RX ADMIN — POTASSIUM CHLORIDE 40 MEQ: 1500 TABLET, EXTENDED RELEASE ORAL at 08:53

## 2023-08-03 RX ADMIN — ENOXAPARIN SODIUM 40 MG: 40 INJECTION SUBCUTANEOUS at 21:28

## 2023-08-03 NOTE — CASE MANAGEMENT
[address] [place] [address] received request for authorization from Care Manager. Authorization request for: [address] Name: Junior Camarena NPI: [id number]   Facility MD:  Avani Acosta NPI: [id number]  Authorization initiated by contacting insurance: Gilbert Rowe Via: Fax.    Clinicals submitted via: Fax. [phone]

## 2023-08-03 NOTE — DISCHARGE INSTR - OTHER ORDERS
Skin care plans:  1-Hydraguard to bilateral heels BID and PRN  2-Elevate heels to offload pressure. 3-Ehob cushion in chair when out of bed. 4-Moisturize skin daily with skin nourishing cream.  5-Turn/reposition q2h or when medically stable for pressure re-distribution on skin.    6-Apply stoma powder to wound bed and cover with Calazime cream to buttocks/sacrum BID and PRN

## 2023-08-03 NOTE — CASE MANAGEMENT
Case Management Progress Note    Patient name [facility] S /S -01 MRN [id number]  : 1985 Date 8/3/2023       LOS (days): 6  Geometric Mean LOS (GMLOS) (days):   Days to GMLOS:        OBJECTIVE:        Current admission status: Inpatient  Preferred Pharmacy: No Pharmacies Listed  Primary Care Provider: Ronna Avelar MD    Primary Insurance: Arabella Moody  Secondary Insurance:     PROGRESS NOTE:  [facility] has accepted the Pt for admission to their facility once an auth can be obtained.  will be submitting for an auth once Homer Preston has provided their NPI information.

## 2023-08-03 NOTE — PHYSICAL THERAPY NOTE
PHYSICAL THERAPY NOTE      Patient Name: Juan Alberto Lagunas  HNSFL'E Date: 8/3/2023         08/03/23 9874   PT Last Visit   PT Visit Date 08/03/23   Note Type   Note Type Treatment   Pain Assessment   Pain Assessment Tool 0-10   Pain Score No Pain   Patient's Stated Pain Goal No pain   Hospital Pain Intervention(s) Repositioned; Ambulation/increased activity; Emotional support; Rest   Multiple Pain Sites No   Pain Rating: FLACC (Rest) - Face 0   Pain Rating: FLACC (Rest) - Legs 0   Pain Rating: FLACC (Rest) - Activity 0   Pain Rating: FLACC (Rest) - Cry 0   Pain Rating: FLACC (Rest) - Consolability 0   Score: FLACC (Rest) 0   Restrictions/Precautions   Weight Bearing Precautions Per Order No   Other Precautions Cognitive; Chair Alarm; Bed Alarm;Multiple lines; Fall Risk;O2  (2L [place] 02)   General   Chart Reviewed Yes   Response to Previous Treatment Patient with no complaints from previous session. Family/Caregiver Present Yes  (pre tx session no family present, post tx sesison family present)   Cognition   Overall Cognitive Status Impaired   Arousal/Participation Alert; Responsive; Cooperative   Attention Attends with cues to redirect   Orientation Level Oriented to person;Oriented to place;Oriented to situation;Disoriented to time   Memory Decreased short term memory;Decreased recall of recent events;Decreased recall of precautions   Following Commands Follows one step commands with increased time or repetition   Comments pt was pleasant, cooperative and motivated to get up and walk with me today   Subjective   Subjective pt was agreeable to participate in PT intervention. Bed Mobility   Supine to Sit 5  Supervision   Additional items Assist x 1;HOB elevated; Bedrails; Increased time required;Verbal cues   Sit to Supine Unable to assess   Additional Comments pt seated OOB in the recliner post tx session with call bell, family present, chair alarm activated and all pt needs met   Transfers   Sit to Stand 3  Moderate assistance   Additional items Assist x 1; Increased time required;Verbal cues   Stand to Sit 4  Minimal assistance   Additional items Assist x 1; Armrests; Increased time required;Verbal cues   Stand pivot 5  Supervision   Additional items Assist x 1; Armrests; Increased time required;Verbal cues  (w/ RW)   Additional Comments (S)  pt varied w/ amount of assistanec required in todays tx sesison with functional transfers to and from RW. Initial STS from EOB to rW required mod Ax1 all other STS required min Ax1 w/ VC's for hand placement and SPT required /s with VC's while descending into recliner   Ambulation/Elevation   Gait pattern Decreased foot clearance; Improper Weight shift; Foward flexed; Short stride; Excessively slow;Decreased hip extension;Decreased heel strike;Decreased toe off   Gait Assistance 4  Minimal assist   Additional items Assist x 1;Verbal cues   Assistive Device Rolling walker   Distance 60'x1 RW   Stair Management Assistance Not tested   Ambulation/Elevation Additional Comments with ambulation of 60'x1 RW pt Sp02 decreased to 83% on 2L NC02. pt required seated rest break in order to allow Sp02 to increase within normal ranges   Balance   Static Sitting Fair +   Dynamic Sitting Fair   Static Standing Fair -   Dynamic Standing Poor +   Ambulatory Poor +  (w/ RW)   Endurance Deficit   Endurance Deficit Yes   Endurance Deficit Description limited activity tolerance, funcitonal mobility and mabulation distance   Activity Tolerance   Activity Tolerance Patient limited by fatigue; Other (Comment)  (SOB, decreased Sp02)   Nurse Made Aware Spoke to RN   Exercises   Hip Abduction Sitting;15 reps;AROM; Bilateral   Hip Adduction Sitting;15 reps;AROM; Bilateral  (pillow squeezes)   Knee AROM Long Arc Quad Sitting;15 reps;AROM; Bilateral   Ankle Pumps Sitting;20 reps;AROM; Bilateral   Marching Sitting;10 reps;AROM; Bilateral   Assessment Prognosis Fair   Problem List Decreased strength;Decreased range of motion; Impaired balance;Decreased endurance;Decreased mobility; Impaired judgement;Decreased safety awareness; Obesity; Impaired sensation;Decreased skin integrity   Assessment pt began tx session lying supine in the bed and was agreeable to participate in PT intervention. pt continues to progress with skilled PT intervention as pt was able to complete a supine<>sit EoB transfer with a decrease in level of assistance required compared from previous tx sessions /s as pt utilized bed rail in order to complete transfer to seated EOB. pt w/o LOB at EOB while being educated on functional transfers and completing TE activities in order to increase static/dynamic sitting balance and tolerance. pt varied w/ assistance required for all functional transfers to and from RW. Initially pt required mod Ax1 for 1st STS transfer but 2nd and 3rd STS required min Ax1 w/ VC's for ahnd placemenet. SPT required /s w/ VC's for hand placement while descending into recliner. pt was able to increase activity tolerance and ambulation distance as pt ambulated 60'x1 w/ min Ax1 no LOB. additional not possible due to decreased Sp02 83% and SOB. pt required a therapeutic seated rest breaks post ambulation trial. pt would benefit from cotninued skilled PT intervention in order to increase activity tolerance, functional mobility and ambulation distances. Post tx pt in recliner wt call bell and all pt needs met. Continue to recommend post acute rehab services at the time of D/C in order to maximize pt functional independence and safety with all OOB mobility. Barriers to Discharge Inaccessible home environment;Decreased caregiver support   Goals   Patient Goals none stated this tx [facility] Expiration Date 08/09/23   PT Treatment Day 3   Plan   Treatment/Interventions Functional transfer training;LE strengthening/ROM; Therapeutic exercise; Endurance training;Cognitive reorientation;Equipment eval/education;Patient/family training;Bed mobility;Gait training;Spoke to nursing   Progress Slow progress, decreased activity tolerance   PT Frequency 3-5x/wk   Recommendation   PT Discharge Recommendation Post acute rehabilitation services   AM-PAC Basic Mobility Inpatient   Turning in Flat Bed Without Bedrails 2   Lying on Back to Sitting on Edge of Flat Bed Without Bedrails 3   Moving Bed to Chair 3   Standing Up From Chair Using Arms 3   Walk in Room 3   Climb 3-5 Stairs With Railing 1   Basic Mobility Inpatient Raw Score 15   Basic Mobility Standardized Score 36.97   Highest Level Of Mobility   -HLM Goal 4: Move to chair/commode   -HLM Achieved 7: Walk 25 feet or more   Education   Education Provided Mobility training;Assistive device   Patient Reinforcement needed   End of Consult   Patient Position at End of Consult Bedside chair;Bed/Chair alarm activated; All needs within reach     [facility]

## 2023-08-03 NOTE — PROGRESS NOTES
[facility]  Progress Note  Name: Francesco Moses  MRN: [id number]  Unit/Bed#: S -01 I Date of Admission: 7/28/2023   Date of Service: 8/3/2023 I Hospital Day: 6    Assessment/Plan   * (HFpEF) heart failure with preserved ejection fraction [facility])  Assessment & Plan  · Volume overload on presentation  · Weight 140 kg at cardiology office visit 7/19/2023. · BNP 10. Chest x-ray did not show significant vascular congestion or pleural effusions. · Report of non-compliance with home medications per family. · Echo from 6/2/2023 revealed EF of 65%. No wall motion abnormalities. · Previously, 2-3+ pitting edema in the b/l LEs to upper shin   · Current additional dose of 20 mg torsemide yesterday, still examines overloaded, with pitting edema, no rales. On 2L O2 satting upper 90s. · Plan:  · Continue home regimen: Torsemide INC to 30 mg daily, continue potassium INC to 20 mg daily. · Recommend standing weights. · I/Os   · AM BMP   · Encourage patient to be compliant with pharmacotherapy outpatient as prescribed. Fall  Assessment & Plan  · Patient is a poor historian, etiology/mechanism of fall unclear. · Trauma work-up at the ED was unremarkable for any acute traumatic injuries. · MRI brain - -ve for acute findings. Has chronic microangiopathic changes. · Pt continues to be minimally verbal. Unknown etiology. Family reports he has been like this over the last 3 to 4 months. · Plan:  · Follow-up with neurosurgery outpatient. · PT/OT -recommending postacute rehab  · Case management assisting with placement    SIRS (systemic inflammatory response syndrome) (HCC)-resolved as of 7/31/2023  Assessment & Plan  · Met SIRS criteria initially (hypothermia, tachypnea, tachycardic. · Labs without leukocytosis. Lactic acid 2.1 --> 2.0. Normal WBC. Pro-Scott negative. UA negative. COVID-negative.   · Blood cultures -ve to date   · Source unclear, suspect most likely viral respiratory illness versus bacterial.   · Remains afebrile and HD stable     · Plan:  · Monitor off antibiotics and IVF   · Trend temperature     Obesity hypoventilation syndrome ([address])  Assessment & Plan  · Patient denies snoring, PND. · VBG results: Respiratory acidosis with metabolic compensation. Normal pH. · Suspect could be due to obesity hypoventilation syndrome: BMI 42.78 or undiagnosed CROW   · Patient weaned off BiPAP on 7/29   · Currently on 6L O2 satting high 90s    · Plan:  · Continue to wean on O2   · Encourage patient to get sleep study outpatient. · BiPAP at bedtime - however, refusing inpatient   · Referral to Pulmonolgy at discharge, Sleep study as outpatient     Testicular cancer [facility])  Assessment & Plan  · Patient's chemotherapy regimen cisplatin, etoposide (3/20/2023 - 5/26/2023), discontinued due to side effects (double vision, weakness on the right side, labile affect), duration of therapy unknown. · Positive family history of cancer in his father who had lung and bone cancer. · Plan:  · Follow-up with heme-onc outpatient. Bipolar I disorder (HCC)  Assessment & Plan  · Home regimen Seroquel 25 mg and fluoxetine 10 mg daily. Patient reports being compliant with medication but girlfriend disagrees. · Plan:  · Continue home medications. · Encourage patient to be compliant with pharmacotherapy outpatient. Hypokalemia  Assessment & Plan  · Potassium 2.8, Magnesium 1.8 on admission  · Patient was prescribed 10 mEq potassium daily outpatient but unsure about compliance. · Potassium this morning at 3.3; Given 40 meq IV in addition to home med     · Plan  · Monitor BMP = K+ replace as needed  · Continue home potassium, cont increased to 20 mEq daily    Hyponatremia  Assessment & Plan  · Sodium 128 on admission. · Etiology unclear, could be due to acute on chronic CHF, possible torsemide use, or SIADH secondary to psych meds (antipsychotics, SSRIs).  Appears euvolemic on exam, suspect 2/2 hypervolemic hyonatremia d/t CHF. · Stable    · Plan:  · home torsemide  · Monitor Na    Hypothermia-resolved as of 2023  Assessment & Plan  · Temperature POA 90.3  · Of note: Temperatures were initially taken by Sheppard probe (possible falsely low measurements due to misplaced probe or malfunctioning). Later, switched to oral temperature after discontinuing bear hugger. · Resolved. VTE Pharmacologic Prophylaxis: VTE Score: 6 High Risk (Score >/= 5) - Pharmacological DVT Prophylaxis Ordered: enoxaparin (Lovenox). Sequential Compression Devices Ordered. Patient Centered Rounds: I performed bedside rounds with nursing staff today. Discussions with Specialists or Other Care Team Provider: Nursing, case management    Education and Discussions with Family / Patient: Updated  (significant other) via phone. Current Length of Stay: 6 day(s)  Current Patient Status: Inpatient   Discharge Plan: Anticipate discharge in 24-48 hrs to rehab facility. Pending placement. Patient is stable for discharge. Code Status: Level 1 - Full Code    Subjective:   Reported by RN, buttocks wound noted and some bleeding present. Patient denied all symptoms including shortness of breath, chest pain, abdominal pain, difficulty urinating. Has no complaints. Patient continues to be minimally verbal.     Objective:     Vitals:   Temp (24hrs), Av.6 °F (36.4 °C), Min:97.2 °F (36.2 °C), Max:97.7 °F (36.5 °C)    Temp:  [97.2 °F (36.2 °C)-97.7 °F (36.5 °C)] 97.7 °F (36.5 °C)  HR:  [73-91] 87  Resp:  [18] 18  BP: (112-152)/() 152/88  SpO2:  [90 %-96 %] 96 %  Body mass index is 40.96 kg/m². Input and Output Summary (last 24 hours): Intake/Output Summary (Last 24 hours) at 8/3/2023 1352  Last data filed at 2023 1854  Gross per 24 hour   Intake --   Output 375 ml   Net -375 ml       Physical Exam:   Physical Exam  Vitals and nursing note reviewed.    Constitutional:       General: He is not in acute distress. Appearance: Normal appearance. He is morbidly obese. He is not ill-appearing. Interventions: Nasal cannula in place. HENT:      Head: Normocephalic and atraumatic. Mouth/Throat:      Mouth: Mucous membranes are moist.      Pharynx: Oropharynx is clear. Eyes:      General: No scleral icterus. Conjunctiva/sclera: Conjunctivae normal.   Cardiovascular:      Rate and Rhythm: Normal rate and regular rhythm. Pulses: Normal pulses. Heart sounds: No murmur heard. No gallop. Pulmonary:      Effort: Pulmonary effort is normal. No respiratory distress. Breath sounds: No wheezing or rales. Abdominal:      General: Bowel sounds are normal. There is no distension. Palpations: Abdomen is soft. There is no mass. Tenderness: There is no abdominal tenderness. Musculoskeletal:         General: No tenderness. Normal range of motion. Cervical back: Normal range of motion and neck supple. No tenderness. Right lower le+ Pitting Edema present. Left lower le+ Pitting Edema present. Skin:     General: Skin is warm and dry. Capillary Refill: Capillary refill takes less than 2 seconds. Neurological:      Mental Status: He is alert and oriented to person, place, and time. Mental status is at baseline. Sensory: No sensory deficit. Psychiatric:         Mood and Affect: Mood normal.         Behavior: Behavior normal.       Additional Data:     Labs:  Results from last 7 days   Lab Units 23  0432 23  0433 07/28/23  1855   WBC Thousand/uL 8.52   < > 5.17   HEMOGLOBIN g/dL 12.6   < > 13.7   HEMATOCRIT % 38.0   < > 39.8   PLATELETS Thousands/uL 197   < > 228   NEUTROS PCT %  --   --  74   LYMPHS PCT %  --   --  16   MONOS PCT %  --   --  8   EOS PCT %  --   --  1    < > = values in this interval not displayed.      Results from last 7 days   Lab Units 23  0432 23  1427 23  0433   SODIUM mmol/L 134*   < > 131*   POTASSIUM mmol/L 3.4*   < > 3.2*   CHLORIDE mmol/L 88*   < > 87*   CO2 mmol/L 38*   < > 35*   BUN mg/dL 13   < > 10   CREATININE mg/dL 0.85   < > 0.78   ANION GAP mmol/L 8   < > 9   CALCIUM mg/dL 9.6   < > 9.0   ALBUMIN g/dL  --   --  3.9   TOTAL BILIRUBIN mg/dL  --   --  0.46   ALK PHOS U/L  --   --  118*   ALT U/L  --   --  156*   AST U/L  --   --  77*   GLUCOSE RANDOM mg/dL 94   < > 91    < > = values in this interval not displayed. Results from last 7 days   Lab Units 07/29/23  0155 07/28/23  2314 07/28/23  2101   LACTIC ACID mmol/L 2.0 2.1*  --    PROCALCITONIN ng/ml  --   --  0.09       Lines/Drains:  Invasive Devices     Peripheral Intravenous Line  Duration           Peripheral IV 08/01/23 Distal;Left;Upper;Ventral (anterior) Arm 2 days                Imaging: No pertinent imaging reviewed. Recent Cultures (last 7 days):   Results from last 7 days   Lab Units 07/29/23  0047   BLOOD CULTURE  No Growth After 5 Days. No Growth After 5 Days. Last 24 Hours Medication List:   Current Facility-Administered Medications   Medication Dose Route Frequency Provider Last Rate   • albuterol  2 puff Inhalation Q4H PRN Arnaldo Jauregui DO     • albuterol  2.5 mg Nebulization Q4H PRN Julienne Corea DO     • enoxaparin  40 mg Subcutaneous Q12H [address] Elvia Rosenberg MD     • FLUoxetine  10 mg Oral Daily Arnaldo Jauregui DO     • potassium chloride  40 mEq Oral Daily Laurita Hernandes MD     • QUEtiapine  25 mg Oral HS Arnaldo Jauregui DO     • [START ON 8/4/2023] torsemide  30 mg Oral Daily Laurita Hernandes MD          Today, Patient Was Seen By: Laurita Hernandes MD    **Please Note: This note may have been constructed using a voice recognition system. **

## 2023-08-03 NOTE — WOUND OSTOMY CARE
Consult Note - Wound   Cristopher Silva 41 y.o. male MRN: [id number]  Unit/Bed#: S -01 Encounter: [id number]        History and Present Illness:  Patient is 42 yo male [facility] with heart failure. Patient has history of bipolar and testicular cancer. Patient is incontinent of bowel and bladder. Patient is mod assist with turning from side to side for assessment. Patient is independent with meals. Assessment Findings:   B/L heels intact and blanching, preventative skin care orders placed. 1. MASD B/L buttocks- possible friction component due to atypical presentation of partial thickness skin loss on mostly on distal buttocks and inner buttocks with pink tissue, periwound skin is fragile, small amount of serosanguineous drainage. No induration, fluctuance, odor, warmth/temperature differences, redness, or purulence noted to the above noted wounds and skin areas assessed. New dressings applied per orders listed below. Patient tolerated well- no s/s of non-verbal pain or discomfort observed during the encounter. Bedside nurse aware of plan of care. See flow sheets for more detailed assessment findings. Wound care will continue to follow. Skin care plans:  1-Hydraguard to bilateral heels BID and PRN  2-Elevate heels to offload pressure. 3-Ehob cushion in chair when out of bed. 4-Moisturize skin daily with skin nourishing cream.  5-Turn/reposition q2h or when medically stable for pressure re-distribution on skin.    6-Apply stoma powder to wound bed and cover with Calazime cream to buttocks/sacrum BID and PRN    Wounds:          Wound 08/03/23 MASD Buttocks (Active)   Wound Description Granulation tissue;Pink;Fragile 08/03/23 0930   Donna-wound Assessment Fragile 08/03/23 0930   Wound Length (cm) 5 cm 08/03/23 0930   Wound Width (cm) 10 cm 08/03/23 0930   Wound Depth (cm) 0.1 cm 08/03/23 0930   Wound Surface Area (cm^2) 50 cm^2 08/03/23 0930   Wound Volume (cm^3) 5 cm^3 08/03/23 0930 Calculated Wound Volume (cm^3) 5 cm^3 08/03/23 0930   Tunneling 0 cm 08/03/23 0930   Tunneling in depth located at 0 08/03/23 0930   Undermining 0 08/03/23 0930   Undermining is depth extending from 0 08/03/23 0930   Wound Site Closure BLANCO 08/03/23 0930   Drainage Amount Small 08/03/23 0930   Drainage Description Serosanguineous 08/03/23 0930   Non-staged Wound Description Partial thickness 08/03/23 0930   Treatments Cleansed 08/03/23 0930   Dressing Moisture barrier 08/03/23 0930   Wound packed? No 08/03/23 0930   Packing- # removed 0 08/03/23 0930   Packing- # inserted 0 08/03/23 0930   Dressing Changed New 08/03/23 0930   Patient Tolerance Tolerated well 08/03/23 0930   Dressing Status Clean;Dry; Intact 08/03/23 0930           Odalys Saul BSN, RN, [employer]

## 2023-08-03 NOTE — PLAN OF CARE
Problem: PHYSICAL THERAPY ADULT  Goal: Performs mobility at highest level of function for planned discharge setting. See evaluation for individualized goals. Description: Treatment/Interventions: Functional transfer training, LE strengthening/ROM, Therapeutic exercise, Endurance training, Cognitive reorientation, Patient/family training, Equipment eval/education, Bed mobility, Gait training, Spoke to MD, Spoke to nursing (PT to see for elevations assessment and goal creation when appropriate)    Equipment Recommended:  (TBD pending progress)     See flowsheet documentation for full assessment, interventions and recommendations. Outcome: Progressing  Note: Prognosis: Fair  Problem List: Decreased strength, Decreased range of motion, Impaired balance, Decreased endurance, Decreased mobility, Impaired judgement, Decreased safety awareness, Obesity, Impaired sensation, Decreased skin integrity  Assessment: pt began tx session lying supine in the bed and was agreeable to participate in PT intervention. pt continues to progress with skilled PT intervention as pt was able to complete a supine<>sit EoB transfer with a decrease in level of assistance required compared from previous tx sessions /s as pt utilized bed rail in order to complete transfer to seated EOB. pt w/o LOB at EOB while being educated on functional transfers and completing TE activities in order to increase static/dynamic sitting balance and tolerance. pt varied w/ assistance required for all functional transfers to and from RW. Initially pt required mod Ax1 for 1st STS transfer but 2nd and 3rd STS required min Ax1 w/ VC's for ahnd placemenet. SPT required /s w/ VC's for hand placement while descending into recliner. pt was able to increase activity tolerance and ambulation distance as pt ambulated 60'x1 w/ min Ax1 no LOB. additional not possible due to decreased Sp02 83% and SOB.  pt required a therapeutic seated rest breaks post ambulation trial. pt would benefit from cotninued skilled PT intervention in order to increase activity tolerance, functional mobility and ambulation distances. Post tx pt in recliner wth call bell and all pt needs met. Continue to recommend post acute rehab services at the time of D/C in order to maximize pt functional independence and safety with all OOB mobility. Barriers to Discharge: Inaccessible home environment, Decreased caregiver support     PT Discharge Recommendation: Post acute rehabilitation services    See flowsheet documentation for full assessment.

## 2023-08-04 LAB
ANION GAP SERPL CALCULATED.3IONS-SCNC: 8 MMOL/L
ANION GAP SERPL CALCULATED.3IONS-SCNC: 8 MMOL/L
BUN SERPL-MCNC: 14 MG/DL (ref 5–25)
BUN SERPL-MCNC: 14 MG/DL (ref 5–25)
CALCIUM SERPL-MCNC: 9.2 MG/DL (ref 8.4–10.2)
CALCIUM SERPL-MCNC: 9.2 MG/DL (ref 8.4–10.2)
CHLORIDE SERPL-SCNC: 88 MMOL/L (ref 96–108)
CHLORIDE SERPL-SCNC: 88 MMOL/L (ref 96–108)
CO2 SERPL-SCNC: 37 MMOL/L (ref 21–32)
CO2 SERPL-SCNC: 37 MMOL/L (ref 21–32)
CREAT SERPL-MCNC: 0.83 MG/DL (ref 0.6–1.3)
CREAT SERPL-MCNC: 0.83 MG/DL (ref 0.6–1.3)
GFR SERPL CREATININE-BSD FRML MDRD: 113 ML/MIN/1.73SQ M
GFR SERPL CREATININE-BSD FRML MDRD: 113 ML/MIN/1.73SQ M
GLUCOSE SERPL-MCNC: 93 MG/DL (ref 65–140)
GLUCOSE SERPL-MCNC: 93 MG/DL (ref 65–140)
POTASSIUM SERPL-SCNC: 3.5 MMOL/L (ref 3.5–5.3)
POTASSIUM SERPL-SCNC: 3.5 MMOL/L (ref 3.5–5.3)
SODIUM SERPL-SCNC: 133 MMOL/L (ref 135–147)
SODIUM SERPL-SCNC: 133 MMOL/L (ref 135–147)

## 2023-08-04 PROCEDURE — 97110 THERAPEUTIC EXERCISES: CPT

## 2023-08-04 PROCEDURE — 97535 SELF CARE MNGMENT TRAINING: CPT

## 2023-08-04 PROCEDURE — 80048 BASIC METABOLIC PNL TOTAL CA: CPT

## 2023-08-04 PROCEDURE — 97116 GAIT TRAINING THERAPY: CPT

## 2023-08-04 PROCEDURE — 99232 SBSQ HOSP IP/OBS MODERATE 35: CPT | Performed by: INTERNAL MEDICINE

## 2023-08-04 RX ADMIN — ENOXAPARIN SODIUM 40 MG: 40 INJECTION SUBCUTANEOUS at 09:19

## 2023-08-04 RX ADMIN — POTASSIUM CHLORIDE 40 MEQ: 1500 TABLET, EXTENDED RELEASE ORAL at 09:19

## 2023-08-04 RX ADMIN — FLUOXETINE 10 MG: 10 CAPSULE ORAL at 09:19

## 2023-08-04 RX ADMIN — TORSEMIDE 30 MG: 20 TABLET ORAL at 09:24

## 2023-08-04 RX ADMIN — ENOXAPARIN SODIUM 40 MG: 40 INJECTION SUBCUTANEOUS at 21:27

## 2023-08-04 RX ADMIN — QUETIAPINE FUMARATE 25 MG: 25 TABLET ORAL at 21:27

## 2023-08-04 NOTE — UTILIZATION REVIEW
Continued Stay Review    Date: 8/4/23                          Current Patient Class: IP  Current Level of Care: Med Surg    HPI:38 y.o. male initially admitted on 7/28     Assessment/Plan: Suspect patient's O2 requirements are because of mild volume overload and not being compliant with diuretics for CHF. This was reported on admission H&P. Patient will benefit from outpatient follow-up with pulmonary for sleep studies. Suspect patient has component of OHS/CROW. Increase torsemide dose to 30 mg daily. MRI of the brain was done that is negative for any acute abnormality. Does show mild chronic angiopathy. Now medically stable for discharge to rehab. CM awaiting insurance authorization for rehab.      Vital Signs:     Date/Time Temp Pulse Resp BP MAP (mmHg) SpO2 Calculated FIO2 (%) - Nasal Cannula Nasal Cannula O2 Flow Rate (L/min) O2 Device   08/04/23 09:24:10 -- 115 Abnormal  -- 127/101 Abnormal  110 85 % Abnormal  -- -- --   08/04/23 0700 97.8 °F (36.6 °C) 103 -- 97/67 -- 91 % -- -- --   08/04/23 02:47:23 98.9 °F (37.2 °C) 96 -- 103/69 80 94 % -- -- --   08/03/23 21:56:06 98.1 °F (36.7 °C) 89 -- 128/81 97 94 % -- -- --   08/03/23 18:56:24 97.5 °F (36.4 °C) 97 -- 128/93 105 94 % -- -- --   08/03/23 15:03:55 97.8 °F (36.6 °C) 88 -- 116/94 101 97 % -- -- --   08/03/23 1123 -- -- -- 152/88 -- -- -- -- --   08/03/23 11:13:43 97.7 °F (36.5 °C) 87 18 143/101 Abnormal  115 96 % -- -- --   08/03/23 0800 -- -- -- -- -- -- -- -- Nasal cannula   08/03/23 07:43:59 97.7 °F (36.5 °C) 73 18 112/75 87 94 % -- -- --   08/03/23 02:26:15 97.7 °F (36.5 °C) 76 -- 119/79 92 93 % -- -- --   08/02/23 22:31:59 97.2 °F (36.2 °C) Abnormal  86 -- 133/85 101 94 % -- -- --   08/02/23 19:10:17 -- 85 -- 139/87 104 90 % -- -- --   08/02/23 19:09:31 -- -- -- 139/87 104 -- -- -- --   08/02/23 14:55:34 97.6 °F (36.4 °C) 91 18 112/78 89 92 % -- -- --   08/02/23 1356 -- -- -- -- -- 94 % 24 1 L/min Nasal cannula   08/02/23 13:30:59 -- 89 -- 126/86 99 93 % -- -- --   08/02/23 10:56:59 97.7 °F (36.5 °C) 89 18 112/76 88 91 % -- -- --   08/02/23 10:05:59 -- 84 -- 122/86 98 95 % -- -- --   08/02/23 0800 -- -- -- -- -- -- -- -- Nasal cannula   08/02/23 07:44:01 97.6 °F (36.4 °C) 80 18 114/74 87 94 % -- -- --   08/02/23 02:43:22 97.8 °F (36.6 °C) 93 19 130/80 97 93 % -- -- --       Pertinent Labs/Diagnostic Results:     7/31 MRI brain:  IMPRESSION:     No acute intracranial abnormality.     No enhancing intracranial metastasis given mild motion artifact on postcontrast sequences     Mild chronic microangiopathy. Results from last 7 days   Lab Units 07/29/23  0049   SARS-COV-2  Negative     Results from last 7 days   Lab Units 07/31/23 0432 07/30/23 0517 07/29/23 0433 07/28/23 1855 07/28/23 1855 07/28/23  1820   WBC Thousand/uL 8.52 6.96 5.78   < > 5.17  --    HEMOGLOBIN g/dL 12.6 12.1 12.2  --  13.7  --    I STAT HEMOGLOBIN g/dl  --   --   --   --   --  14.3   HEMATOCRIT % 38.0 37.0 36.1*  --  39.8  --    HEMATOCRIT, ISTAT %  --   --   --   --   --  42   PLATELETS Thousands/uL 197 201 210   < > 228  --    NEUTROS ABS Thousands/µL  --   --   --   --  3.87  --     < > = values in this interval not displayed.          Results from last 7 days   Lab Units 08/04/23 0445 08/03/23 0432 08/02/23  0555 08/01/23 0442 07/31/23 0432 07/30/23  0517 07/28/23  2314 07/28/23  2101 07/28/23  1855 07/28/23  1820   SODIUM mmol/L 133* 134* 133* 134* 131* 131*   < >  --    < >  --    POTASSIUM mmol/L 3.5 3.4* 3.5 3.6 3.4* 3.3*   < >  --    < >  --    CHLORIDE mmol/L 88* 88* 88* 90* 88* 88*   < >  --    < >  --    CO2 mmol/L 37* 38* 37* 38* 33* 34*   < >  --    < >  --    CO2, I-STAT mmol/L  --   --   --   --   --   --   --   --   --  42*   ANION GAP mmol/L 8 8 8 6 10 9   < >  --    < >  --    BUN mg/dL 14 13 13 12 11 10   < >  --    < >  --    CREATININE mg/dL 0.83 0.85 0.79 0.69 0.74 0.80   < >  --    < >  --    EGFR ml/min/1.73sq m 113 112 115 122 118 114   < >  --    < >  -- CALCIUM mg/dL 9.2 9.6 9.4 9.7 9.7 9.1   < >  --    < >  --    CALCIUM, IONIZED, ISTAT mmol/L  --   --   --   --   --   --   --   --   --  1.19   MAGNESIUM mg/dL  --   --   --   --   --  1.9  --  1.8*  --   --     < > = values in this interval not displayed.      Results from last 7 days   Lab Units 07/29/23  0433 07/28/23  2314   AST U/L 77* 75*   ALT U/L 156* 155*   ALK PHOS U/L 118* 119*   TOTAL PROTEIN g/dL 6.4 6.1*   ALBUMIN g/dL 3.9 3.5   TOTAL BILIRUBIN mg/dL 0.46 0.37         Results from last 7 days   Lab Units 08/04/23  0445 08/03/23  0432 08/02/23  0555 08/01/23  0442 07/31/23  0432 07/30/23  0517 07/29/23  1427 07/29/23  0433 07/28/23  2314 07/28/23  1855   GLUCOSE RANDOM mg/dL 93 94 89 95 100 86 78 91 81 86           Results from last 7 days   Lab Units 07/28/23  1820   PH, SYLVESTER I-STAT  7.402*   PCO2, SYLVESTER ISTAT mm HG 63.7*   PO2, SYLVESTER ISTAT mm HG 62.0*   HCO3, SYLVESTER ISTAT mmol/L 39.6*   I STAT BASE EXC mmol/L 12*   I STAT O2 SAT % 90*           Results from last 7 days   Lab Units 07/28/23  1855   TSH 3RD GENERATON uIU/mL 1.318     Results from last 7 days   Lab Units 07/28/23  2101   PROCALCITONIN ng/ml 0.09     Results from last 7 days   Lab Units 07/29/23  0155 07/28/23  2314   LACTIC ACID mmol/L 2.0 2.1*             Results from last 7 days   Lab Units 07/28/23  1855   BNP pg/mL 10           Results from last 7 days   Lab Units 07/28/23  2336   CLARITY UA  Turbid   COLOR UA  Light Yellow   SPEC GRAV UA  1.016   PH UA  5.5   GLUCOSE UA mg/dl Negative   KETONES UA mg/dl Negative   BLOOD UA  Negative   PROTEIN UA mg/dl Negative   NITRITE UA  Negative   BILIRUBIN UA  Negative   UROBILINOGEN UA (BE) mg/dl <2.0   LEUKOCYTES UA  Negative             Results from last 7 days   Lab Units 07/28/23 2053   AMPH/METH  Negative   BARBITURATE UR  Negative   BENZODIAZEPINE UR  Negative   COCAINE UR  Negative   METHADONE URINE  Negative   OPIATE UR  Negative   PCP UR  Negative   THC UR  Positive*     Results from last 7 days   Lab Units 07/28/23  2101   ETHANOL LVL mg/dL <10                 Results from last 7 days   Lab Units 07/29/23  0047   BLOOD CULTURE  No Growth After 5 Days. No Growth After 5 Days. Medications:   Scheduled Medications:  enoxaparin, 40 mg, Subcutaneous, Q12H ISAEL  FLUoxetine, 10 mg, Oral, Daily  potassium chloride, 40 mEq, Oral, Daily  QUEtiapine, 25 mg, Oral, HS  torsemide, 30 mg, Oral, Daily      Continuous IV Infusions:     PRN Meds:  albuterol, 2 puff, Inhalation, Q4H PRN  albuterol, 2.5 mg, Nebulization, Q4H PRN        Discharge Plan: D    Network Utilization Review Department  ATTENTION: Please call with any questions or concerns to [phone] and carefully listen to the prompts so that you are directed to the right person. All voicemails are confidential.  Ivis Sarmiento all requests for admission clinical reviews, approved or denied determinations and any other requests to dedicated fax number below belonging to the campus where the patient is receiving treatment.  List of dedicated fax numbers for the Facilities:  [place] DENIALS (Administrative/Medical Necessity) [phone] [address] [facility] (Maternity/NICU/Pediatrics) [phone]   [address] [place] [address] Drive [phone]   [facility] [address] [facility] [phone]   [facility] [address] [facility] [id number] [place] [address] [phone]   [address] [address] [phone]

## 2023-08-04 NOTE — CASE MANAGEMENT
Case Management Discharge Planning Note    Patient name Kristine Left  Location S /S -01 MRN [id number]  : 1985 Date 2023       Current Admission Date: 2023  Current Admission Diagnosis:(HFpEF) heart failure with preserved ejection fraction [facility])   Patient Active Problem List    Diagnosis Date Noted   • Testicular cancer ([address]) 2023   • Obesity hypoventilation syndrome ([address]) 2023   • Fall 2023   • Hyponatremia 2023   • Hypokalemia 2023   • Bipolar I disorder ([address]) 2023   • (HFpEF) heart failure with preserved ejection fraction ([address]) 2023      LOS (days): 7  Geometric Mean LOS (GMLOS) (days):   Days to GMLOS:     OBJECTIVE:  Risk of Unplanned Readmission Score: 11.84         Current admission status: Inpatient   Preferred Pharmacy: No Pharmacies Listed  Primary Care Provider: Hollis Finn MD    Primary Insurance: [facility]  Secondary Insurance:     DISCHARGE DETAILS:  CM contacted patient's insurance company at [phone]. Spoke with liaison. Patient's authorization approved for -23. Auth # [id number]. Fax clinicals to [phone]. CM spoke with Fahrdorf from [facility]. Taryn updated with insurance authorization information. Facility is able to accept tomorrow 23. Melissamaria guadalupejodee stated she is going to [employer] and get the dates for [facility] changed. CM spoke with patient at the bedside. Patient's sister on facetime. CM introduced self and role. Patient and sister updated insurance authorization approved for [facility] [place]. CM discussed working on transport for tomorrow morning to [facility]. Patient's sister stated she will update his mother. CM received a call from patient's mother Supa Huerta . CM introduced self and role. Patient's mother updated with plan of care. Patient's mother inquiring if there is a closer facility to [facility]  CM reviewed with patient's mother [facility] rehab moved to [place] from [facility]. Mother aware facility is brand new and CM discussed in length the difference of LOC for acute and subacute rehab. Mother main concern is that family will not be able to visit him during rehab. Patient's mother requesting to speak with family first and will call CM back with a decision. CM received callback from patient's mother stating she spoke with Reena Hinojosa and they both are requesting patient to go to a facility closer to [facility]. Patient's mother is requesting [facility] or [facility]. Mother aware of different level of care but wants him closer. CM discussed with patient's mother referral will need to be sent and patient auth changed which may delay his care. Patient's mother expressed understanding. CM updated receiving facility admission liaison. Admission liaison Nivia  attempted to reach patient's mother and left voice message. CM left another voice message with mother. At this point, [facility] unable to hold inpatient rehab bed if family declining bed and not returning phone calls. CM reopened SNF referrals via [employer]. Waiting for response.

## 2023-08-04 NOTE — PLAN OF CARE
Problem: OCCUPATIONAL THERAPY ADULT  Goal: Performs self-care activities at highest level of function for planned discharge setting. See evaluation for individualized goals. Description: Treatment Interventions: ADL retraining, Functional transfer training, Endurance training, Cognitive reorientation, Patient/family training, Equipment evaluation/education, Compensatory technique education, Continued evaluation, Energy conservation, Activityengagement          See flowsheet documentation for full assessment, interventions and recommendations. Outcome: Progressing  Note: Limitation: Decreased ADL status, Decreased UE ROM, Decreased Safe judgement during ADL, Decreased cognition, Decreased endurance, Decreased self-care trans, Decreased high-level ADLs (balance, act michael, GM coord, fxnl reach, standing michael, strength and fxnl sitting balance, direction following, safety awareness, insight, pacing, problem solving, learning new tasks, initiation and appropriate responses , display of emotion, response time)  Prognosis: Good  Assessment: Pt seen for skilled OT tx session focusing on activity engagement, challenging activity tolerance. Pt agreeable to participate and engaged in bathing, dressing while seated vs standing. Pt completed bed mobility w/ consistent S and + time. Pt performed sit <> stand 2 X w/ min <> mod A and benefits from elevated bed height. Pt engaged in functional mobility using RW w/ min A. Benefits from cues for pacing / breathing and rest breaks throughout due to fatigue. Continue to recommend post acute rehab when medically stable for discharge from acute care.  Will continue to follow     OT Discharge Recommendation: Post acute rehabilitation services

## 2023-08-04 NOTE — DISCHARGE INSTR - AVS FIRST PAGE
Dear Gayle Watson,     It was our pleasure to care for you here at [facility]. It is our hope that we were always able to exceed the expected standards for your care during your stay. You were hospitalized due to acute heart failure exacerbation. You were cared for on the [facility] 2nd floor by Taj Frazier MD under the service of Adina Abarca MD with the [facility] who covers for your primary care physician (PCP), Sharron Foley MD, while you were hospitalized. If you have any questions or concerns related to this hospitalization, you may contact us at [phone]. For follow up as well as any medication refills, we recommend that you follow up with your primary care physician. A registered nurse will reach out to you by phone within a few days after your discharge to answer any additional questions that you may have after going home.   However, at this time we provide for you here, the most important instructions / recommendations at discharge:     Notable Medication Adjustments -   Continue torsemide at 30 mg daily and potassium supplementation at 40 mEq daily for management of heart failure  Continue with bowel regimen MiraLAX and Senokot S for management of constipation  Testing Required after Discharge -   BMP in 1 week   ** Please contact your PCP to request testing orders for any of the testing recommended here **  Important follow up information -   Call to schedule follow-up appoint to be seen by your PCP within 1-2 weeks  Call to schedule a follow appointment to be seen by cardiology within 1-2 weeks  Other Instructions -   Referral for pulmonology for outpatient sleep study - you will be contacted for scheduling an appointment   Avoid high salt intake and fluid restrict to 2 L/day  Please review this entire after visit summary as additional general instructions including medication list, appointments, activity, diet, any pertinent wound care, and other additional recommendations from your care team that may be provided for you.       Sincerely,     Jordy Gonzalez MD

## 2023-08-04 NOTE — PHYSICAL THERAPY NOTE
PHYSICAL THERAPY NOTE          Patient Name: Jessica Thomson  DCAVN'W Date: 23 1018   PT Last Visit   PT Visit Date 23   Note Type   Note Type Treatment   Pain Assessment   Pain Assessment Tool 0-10   Pain Score No Pain   Patient's Stated Pain Goal No pain   Hospital Pain Intervention(s) Repositioned; Ambulation/increased activity; Rest   Multiple Pain Sites No   Pain Rating: FLACC (Rest) - Face 0   Pain Rating: FLACC (Rest) - Legs 0   Pain Rating: FLACC (Rest) - Activity 0   Pain Rating: FLACC (Rest) - Cry 0   Pain Rating: FLACC (Rest) - Consolability 0   Score: FLACC (Rest) 0   Restrictions/Precautions   Weight Bearing Precautions Per Order No   Other Precautions Cognitive; Chair Alarm; Bed Alarm; Fall Risk  (masimo)   General   Chart Reviewed Yes   Response to Previous Treatment Patient with no complaints from previous session. Family/Caregiver Present No   Cognition   Overall Cognitive Status Impaired   Arousal/Participation Alert; Responsive; Cooperative   Attention Attends with cues to redirect   Orientation Level Oriented to person;Oriented to place;Oriented to situation;Disoriented to time   Memory Decreased short term memory;Decreased recall of recent events;Decreased recall of precautions   Following Commands Follows one step commands with increased time or repetition   Comments pt identified by name and    Subjective   Subjective pt was agreeable to participate in PT intervention. Bed Mobility   Rolling R Unable to assess   Rolling L Unable to assess   Supine to Sit Unable to assess   Sit to Supine Unable to assess   Additional Comments pt seated OOB in the recliner pre/post tx session   Transfers   Sit to Stand 4  Minimal assistance   Additional items Assist x 1; Armrests; Increased time required;Verbal cues  (pt contiues t require VC's for hand placement while ascending to RW and descending into recliner)   Stand to Sit 4  Minimal assistance   Additional items Assist x 1; Armrests; Increased time required;Verbal cues  (w/ RW)   Stand pivot 5  Supervision   Additional items Assist x 1; Armrests; Increased time required;Verbal cues   Additional Comments pt continues to require RW and VC's for all functional transfers and ambulation in todays tx session for increased safety and balance   Ambulation/Elevation   Gait pattern Decreased foot clearance; Short stride; Excessively slow;Decreased hip extension   Gait Assistance 4  Minimal assist   Additional items Assist x 1;Verbal cues   Assistive Device Rolling walker   Distance 250'x1 RW   Stair Management Assistance Not tested   Ambulation/Elevation Additional Comments pt continues to increase ambulation distance but continues to require min ax1 for safety and balance   Balance   Static Sitting Fair +   Dynamic Sitting Fair   Static Standing Fair -   Dynamic Standing Poor +   Ambulatory Poor +  (w/ RW)   Endurance Deficit   Endurance Deficit Yes   Endurance Deficit Description limited activity tolerance and ambulation distance due to SOB and decreased Sp02 82%   Activity Tolerance   Activity Tolerance Patient limited by fatigue; Other (Comment)  (SOB and decreased Sp02 82%)   Nurse Made Aware Spoke to RN   Exercises   Hip Abduction Sitting;15 reps;AROM; Bilateral   Hip Adduction Sitting;15 reps;AROM; Bilateral  (pillow squeezes)   Knee AROM Long Arc Quad Sitting;20 reps;AROM; Bilateral   Ankle Pumps Sitting;20 reps;AROM; Bilateral   Marching Sitting;15 reps;AROM; Bilateral   Assessment   Prognosis Fair   Problem List Decreased strength;Decreased range of motion;Decreased endurance; Impaired balance;Decreased mobility; Impaired judgement;Decreased safety awareness; Impaired sensation;Decreased skin integrity   Assessment pt began tx session seated OOB in the recliner and was agreeable to participate in PT intervention.  pt continues to remain consistant with requiring min ax1 for all STS transfers to and from RW with VC's for hand placement and /s for completing SPT from EOB to recliner. pt was able to complete 5 STS in todays tx session all with min ax1 in order to strengthen LE's and increase safety and balance with all functional transfers. pt was able to increase activity tolerance and ambulation distance as pt ambulated 250'x1 RWwith min ax1 for safety and balance. Additional not possible due to fatigue, SOB and decreased Sp02 82% as pt required a seated therapeutic rest break. pt was able to participate in TE activities in recliner in order to strengthen LE's w/o increases in pain and good form throughout exercises. pt would benefit from continued skilled PT intervention in order to return to PLOF as pt was able to complete all functional transfers independently. Continue to recommend post acute rehab services at the time of D/C in order to maximize pt functional independence and safety with all OOB mobility. Post tx pt in recliner with call bell, chair alarm activated and all pt needs met   Barriers to Discharge Inaccessible home environment;Decreased caregiver support   Goals   Patient Goals none stated   STG Expiration Date 08/09/23   PT Treatment Day 4   Plan   Treatment/Interventions Functional transfer training;LE strengthening/ROM; Therapeutic exercise;Cognitive reorientation; Endurance training;Patient/family training;Equipment eval/education; Bed mobility;Gait training;Spoke to nursing   Progress Slow progress, decreased activity tolerance   PT Frequency 3-5x/wk   Recommendation   PT Discharge Recommendation Post acute rehabilitation services   AM-PAC Basic Mobility Inpatient   Turning in Flat Bed Without Bedrails 2   Lying on Back to Sitting on Edge of Flat Bed Without Bedrails 3   Moving Bed to Chair 3   Standing Up From Chair Using Arms 3   Walk in Room 3   Climb 3-5 Stairs With Railing 1   Basic Mobility Inpatient Raw Score 15   Basic Mobility Standardized Score 36.97 Highest Level Of Mobility   JH-HLM Goal 4: Move to chair/commode   JH-HLM Achieved 8: Walk 250 feet ot more   Education   Education Provided Mobility training;Assistive device   Patient Reinforcement needed  (pt continues to forget hand pacement while ascending to RW and descending into recliner)   End of Consult   Patient Position at End of Consult Bedside chair;Bed/Chair alarm activated; All needs within reach   The patient's AM-PAC Basic Mobility Inpatient Short Form Raw Score is 15. A Raw score of less than or equal to 16 suggests the patient may benefit from discharge to post-acute rehabilitation services. Please also refer to the recommendation of the Physical Therapist for safe discharge planning.      Oval Smiling

## 2023-08-04 NOTE — OCCUPATIONAL THERAPY NOTE
Occupational Therapy Progress Note     Patient Name: Margarita HORTON Date: 8/4/2023  Problem List  Principal Problem:    (HFpEF) heart failure with preserved ejection fraction (HCC)  Active Problems:    Fall    Hyponatremia    Hypokalemia    Bipolar I disorder ([address])    Testicular cancer ([address])    Obesity hypoventilation syndrome ([address])        08/04/23 0855   OT Last Visit   OT Visit Date 08/04/23  (Friday)   Note Type   Note Type Treatment   Pain Assessment   Pain Assessment Tool 0-10   Pain Score No Pain   Restrictions/Precautions   Weight Bearing Precautions Per Order No   Other Precautions Cognitive; Chair Alarm; Bed Alarm; Fall Risk  ([place])   Lifestyle   Intrinsic Gratification Pt is a [employer] fan. Enjoys playing video games on Playstation   ADL   Where Assessed Edge of bed  (vs OOB in chair at end of session w/ needs met, call bell in reach and chair alarm activated)   Eating Assistance 6  Modified independent   Eating Deficit Setup; Increased time to complete   Eating Comments eating breakfast upon arrival supine HOB elevated   Grooming Assistance 5  Supervision/Setup   Grooming Deficit Setup;Supervision/safety; Increased time to complete   Grooming Comments seated OOB in bedside recliner chair after set- up w/ + time   UB Bathing Assistance 5  Supervision/Setup   UB Bathing Deficit Setup;Supervision/safety; Increased time to complete   UB Bathing Comments + time after set- up seated   LB Bathing Assistance 4  Minimal Assistance   LB Bathing Deficit Setup;Steadying; Increased time to complete;Perineal area; Buttocks   LB Bathing Comments standing w/ min A and + time to wash buttocks, groin   UB Dressing Assistance 5  Supervision/Setup   UB Dressing Deficit Setup;Supervision/safety; Increased time to complete; Fasteners   UB Dressing Comments doff / don gown during bathing seated w/ + time   LB Dressing Assistance 3  Moderate Assistance   LB Dressing Deficit Thread RLE into underwear; Thread LLE into underwear;Pull up over hips;Setup;Steadying; Requires assistive device for steadying; Increased time to complete;Supervision/safety;Verbal cueing   LB Dressing Comments required mod A to thread LE into underwear while seated. Toileting Assistance  Unable to assess   Toileting Comments denied need to void   Bed Mobility   Supine to Sit 5  Supervision   Additional items Assist x 1;HOB elevated; Increased time required  (to pt's R)   Sit to Supine Unable to assess   Additional Comments Pt seated OOB in chair at end of session w/ needs met, call bell in reach and chair alarm activated   Transfers   Sit to Stand (S)    (mod A initially from elevated bed height and progressed to min A 2nd trial from recliner chair)   Additional items Assist x 1; Increased time required;Verbal cues; Bedrails;Armrests  (instruction for hand placement)   Stand to Sit 4  Minimal assistance   Additional items Assist x 1;Bedrails;Armrests; Increased time required;Verbal cues  (instruction for hand placement)   Additional Comments Pt performed sit <> stand 2X. Initially required mod A from elevated bed height. Progressed to min A from bedside recliner chair. O2 sats 81-92% throughout on room air Recovered to 90's w/ rest/ cues for pacing / breathing   Functional Mobility   Functional Mobility 4  Minimal assistance   Additional Comments short distances w/ in room. Additional items Rolling walker   Cognition   Overall Cognitive Status Impaired   Arousal/Participation Alert; Cooperative   Attention Attends with cues to redirect   Orientation Level Oriented to person;Oriented to place   Memory Decreased short term memory;Decreased recall of precautions;Decreased recall of recent events   Following Commands Follows one step commands with increased time or repetition   Comments Identified pt by full name and birthdate. Alert, agreeable to participate and able to follow directions w/ + time during ADLs.  Flat affect   Activity Tolerance   Activity Tolerance Patient limited by fatigue  (+ SOB, + WOB)   Medical Staff Made Aware spoke w/ Obie KIM and CMKirsty. Spoke w/ RNBrice Other pre / post tx session   Assessment   Assessment Pt seen for skilled OT tx session focusing on activity engagement, challenging activity tolerance. Pt agreeable to participate and engaged in bathing, dressing while seated vs standing. Pt completed bed mobility w/ consistent S and + time. Pt performed sit <> stand 2 X w/ min <> mod A and benefits from elevated bed height. Pt engaged in functional mobility using RW w/ min A. Benefits from cues for pacing / breathing and rest breaks throughout due to fatigue. Continue to recommend post acute rehab when medically stable for discharge from acute care. Will continue to follow   Plan   Treatment Interventions ADL retraining;Functional transfer training;UE strengthening/ROM; Endurance training;Cognitive reorientation;Patient/family training;Equipment evaluation/education; Compensatory technique education;Continued evaluation; Energy conservation; Activityengagement   Goal Expiration Date 08/09/23   OT Treatment Day 2  (Friday 8/4/23)   OT Frequency 3-5x/wk   Recommendation   OT Discharge Recommendation Post acute rehabilitation services   AM-PAC Daily Activity Inpatient   Lower Body Dressing 2   Bathing 3   Toileting 3   Upper Body Dressing 3   Grooming 4   Eating 4   Daily Activity Raw Score 19   Daily Activity Standardized Score (Calc for Raw Score >=11) 40.22   AM-PAC Applied Cognition Inpatient   Following a Speech/Presentation 2   Understanding Ordinary Conversation 3   Taking Medications 2   Remembering Where Things Are Placed or Put Away 3   Remembering List of 4-5 Errands 2   Taking Care of Complicated Tasks 1   Applied Cognition Raw Score 13   Applied Cognition Standardized Score 30.46   Barthel Index   Feeding 5   Bathing 0   Grooming Score 5   Dressing Score 5   Bladder Score 5   Bowels Score 5   Toilet Use Score 5   Transfers (Bed/Chair) Score 10   Mobility (Level Surface) Score 0   Stairs Score 0   Barthel Index Score 40   Modified [place] Scale   Modified Kaylee Scale 4   End of Consult   Education Provided Yes  (hand placement sit <> stand)   Patient Position at End of Consult Bedside chair;Bed/Chair alarm activated; All needs within reach   Nurse Communication Nurse aware of consult     GOALS:     *Goals established to promote patient goal to return to functional baseline:       *ADL transfers with Min (A) x 2 for inc'd independence with ADLs/purposeful tasks (MET: UPGRADE TO MOD (I))   PROGRESSING     *Patient will perform grooming tasks sitting EOB with (S) in order to increase overall independence and fine motor control (MET: UPGRADE TO MOD (I)) STANDING AT SINK)  PROGRESSING     *UB ADL with (S) for inc'd independence with self care and gross motor control/motor planning (MET: UPGRADE TO MOD (I))  PROGRESSING     *LB ADL with Mod (A) using AE prn for inc'd independence with self care and increased functional reach PROGRESSING     *Toileting with Mod (A) for clothing management and hygiene to increase hygiene/thoroughness in order to reduce caregiver burden (MET: UPGRADE TO (S))  PROGRESSING     *Increase stand tolerance x 2  m for inc'd tolerance with standing purposeful tasks (MET: UPGRADE TO 5-7 MINUTES)  PROGRESSING     *Participate in 10m UE therex to increase overall stamina/activity tolerance for purposeful tasks     *Bed mobility- Min (A) for inc'd independence to manage own comfort and initiate EOB & OOB purposeful tasks ACHIEVED / Met; MODIFIED GOAL: Mod I     *Patient will verbalize 3 safety awareness/ principles to prevent falls in the home setting. PROGRESSING     *Patient will verbalize and demonstrate use of energy conservation/deep breathing techniques and work simplification skills during functional activities with no verbal cues.  PROGRESSING     *Patient will increase OOB/sitting tolerance to 2-4 hours per day to increase participation in self-care and leisure tasks with no s/s of exertion. PROGRESSING     *Patient will engage in ongoing cognitive assessment to assist with safe discharge planning/recommendations. *Patient will increase functional mobility to and from [facility] with rolling walker with min assist x 2 to increase independence with toileting (MET: UPGRADE TO (S))  PROGRESSING     *Patient will improve functional activity tolerance to 20 minutes of sustained functional tasks to increase participation in basic self-care and decrease assistance level. (MET: UPGRADE TO 39 MINUTES)  PROGRESSING     *Pt will consistently follow one step directions during ADL performance w/ 50-75% accuracy to max I and safety w/ ADL performance (MET: UPGRADE TO MULTI-STEP COMMANDS)   PROGRESSING        The patient's raw score on the -PAC Daily Activity Inpatient Short Form is 19. A raw score of greater than or equal to 19 suggests the patient may benefit from discharge to home. Please refer to the recommendation of the Occupational Therapist for safe discharge planning. OT DC recommendation does not coincide w/ AMPAC score. Recommend post acute rehab when medically stable for discharge.        Sadia Zabala OTR/L  [id number]  [id number]

## 2023-08-04 NOTE — CASE MANAGEMENT
Called insurance to check status of submitted authorization request (P#: [id number]) and spoke with Cleo. who stated authorization is still pending. CM notified.

## 2023-08-04 NOTE — PROGRESS NOTES
[address]  Progress Note  Name: Aren Bobby  MRN: [id number]  Unit/Bed#: S -01 I Date of Admission: 7/28/2023   Date of Service: 8/4/2023 I Hospital Day: 7    Assessment/Plan   * (HFpEF) heart failure with preserved ejection fraction [facility])  Assessment & Plan  · Volume overload on presentation  · Weight 140 kg at cardiology office visit 7/19/2023. · BNP 10. Chest x-ray did not show significant vascular congestion or pleural effusions. · Report of non-compliance with home medications per family. · Echo from 6/2/2023 revealed EF of 65%. No wall motion abnormalities. · Previously, 2-3+ pitting edema in the b/l LEs to upper shin   · Current additional dose of 20 mg torsemide yesterday, still examines overloaded, with pitting edema, no rales. On 2L O2 satting upper 90s. · Plan:  · Continue home regimen: Torsemide continue INC to 30 mg daily, continue potassium INC to 20 mg daily. · Recommend standing weights. · I/Os   · AM BMP   · Encourage patient to be compliant with pharmacotherapy outpatient as prescribed. Fall  Assessment & Plan  · Patient is a poor historian, etiology/mechanism of fall unclear. · Trauma work-up at the ED was unremarkable for any acute traumatic injuries. · MRI brain - -ve for acute findings. Has chronic microangiopathic changes. · Pt continues to be minimally verbal. Unknown etiology. Family reports he has been like this over the last 3 to 4 months. · Plan:  · Follow-up with neurosurgery outpatient. · PT/OT -recommending postacute rehab  · Case management assisting with placement    SIRS (systemic inflammatory response syndrome) (HCC)-resolved as of 7/31/2023  Assessment & Plan  · Met SIRS criteria initially (hypothermia, tachypnea, tachycardic. · Labs without leukocytosis. Lactic acid 2.1 --> 2.0. Normal WBC. Pro-Scott negative. UA negative. COVID-negative.   · Blood cultures -ve to date   · Source unclear, suspect most likely viral respiratory illness versus bacterial.   · Remains afebrile and HD stable     · Plan:  · Monitor off antibiotics and IVF   · Trend temperature     Obesity hypoventilation syndrome ([address])  Assessment & Plan  · Patient denies snoring, PND. · VBG results: Respiratory acidosis with metabolic compensation. Normal pH. · Suspect could be due to obesity hypoventilation syndrome: BMI 42.78 or undiagnosed CROW   · Patient weaned off BiPAP on 7/29   · Currently On Room air     · Plan:  · BiPAP at bedtime - however, refusing inpatient   · Referral to Pulmonolgy at discharge, Sleep study as outpatient     Testicular cancer [facility])  Assessment & Plan  · Patient's chemotherapy regimen cisplatin, etoposide (3/20/2023 - 5/26/2023), discontinued due to side effects (double vision, weakness on the right side, labile affect), duration of therapy unknown. · Positive family history of cancer in his father who had lung and bone cancer. · Plan:  · Follow-up with heme-onc outpatient. Bipolar I disorder (HCC)  Assessment & Plan  · Home regimen Seroquel 25 mg and fluoxetine 10 mg daily. Patient reports being compliant with medication but girlfriend disagrees. · Plan:  · Continue home medications. · Encourage patient to be compliant with pharmacotherapy outpatient. Hypokalemia  Assessment & Plan  · Potassium 2.8, Magnesium 1.8 on admission  · Patient was prescribed 10 mEq potassium daily outpatient but unsure about compliance. · Potassium this morning at 3.3; Given 40 meq IV in addition to home med     · Plan  · Monitor BMP = K+ replace as needed  · Continue home potassium, cont increased to 20 mEq daily    Hyponatremia  Assessment & Plan  · Sodium 128 on admission. · Etiology unclear, could be due to acute on chronic CHF, possible torsemide use, or SIADH secondary to psych meds (antipsychotics, SSRIs). Appears euvolemic on exam, suspect 2/2 hypervolemic hyonatremia d/t CHF.   · Stable    · Plan:  · home torsemide  · Monitor Na    Hypothermia-resolved as of 2023  Assessment & Plan  · Temperature POA 90.3  · Of note: Temperatures were initially taken by Sheppard probe (possible falsely low measurements due to misplaced probe or malfunctioning). Later, switched to oral temperature after discontinuing bear hugger. · Resolved. VTE Pharmacologic Prophylaxis: VTE Score: 6 High Risk (Score >/= 5) - Pharmacological DVT Prophylaxis Ordered: enoxaparin (Lovenox). Sequential Compression Devices Ordered. Patient Centered Rounds: I performed bedside rounds with nursing staff today. Discussions with Specialists or Other Care Team Provider: Nursing, case management    Education and Discussions with Family / Patient: Updated  (significant other) at bedside. Current Length of Stay: 7 day(s)   Current Patient Status: Inpatient   Discharge Plan: Anticipate discharge in 24-48 hrs to rehab facility. Pending placement. Patient is stable for discharge. Code Status: Level 1 - Full Code    Subjective:   No overnight events. Patient denied all symptoms including shortness of breath, chest pain, abdominal pain, difficulty urinating. Has no complaints. Patient continues to be minimally verbal.      In afternoon, found in chair with SO at bedside. Objective:     Vitals:   Temp (24hrs), Av.1 °F (36.7 °C), Min:97.5 °F (36.4 °C), Max:98.9 °F (37.2 °C)    Temp:  [97.5 °F (36.4 °C)-98.9 °F (37.2 °C)] 98 °F (36.7 °C)  HR:  [] 108  Resp:  [18] 18  BP: ()/() 124/94  SpO2:  [85 %-94 %] 92 %  Body mass index is 41.06 kg/m². Input and Output Summary (last 24 hours): Intake/Output Summary (Last 24 hours) at 2023 1518  Last data filed at 2023 1001  Gross per 24 hour   Intake 720 ml   Output 1170 ml   Net -450 ml       Physical Exam:   Physical Exam  Vitals and nursing note reviewed. Constitutional:       General: He is not in acute distress. Appearance: Normal appearance.  He is morbidly obese. He is not ill-appearing. Interventions: Nasal cannula in place. HENT:      Head: Normocephalic and atraumatic. Mouth/Throat:      Mouth: Mucous membranes are moist.      Pharynx: Oropharynx is clear. Eyes:      General: No scleral icterus. Conjunctiva/sclera: Conjunctivae normal.   Cardiovascular:      Rate and Rhythm: Normal rate and regular rhythm. Pulses: Normal pulses. Heart sounds: No murmur heard. No gallop. Pulmonary:      Effort: Pulmonary effort is normal. No respiratory distress. Breath sounds: No wheezing or rales. Abdominal:      General: Bowel sounds are normal. There is no distension. Palpations: Abdomen is soft. There is no mass. Tenderness: There is no abdominal tenderness. Musculoskeletal:         General: No tenderness. Normal range of motion. Cervical back: Normal range of motion and neck supple. No tenderness. Right lower le+ Pitting Edema present. Left lower le+ Pitting Edema present. Skin:     General: Skin is warm and dry. Capillary Refill: Capillary refill takes less than 2 seconds. Neurological:      Mental Status: He is alert and oriented to person, place, and time. Mental status is at baseline. Sensory: No sensory deficit. Psychiatric:         Mood and Affect: Mood normal.         Behavior: Behavior normal.       Additional Data:     Labs:  Results from last 7 days   Lab Units 23  0432 23  0433 23  1855   WBC Thousand/uL 8.52   < > 5.17   HEMOGLOBIN g/dL 12.6   < > 13.7   HEMATOCRIT % 38.0   < > 39.8   PLATELETS Thousands/uL 197   < > 228   NEUTROS PCT %  --   --  74   LYMPHS PCT %  --   --  16   MONOS PCT %  --   --  8   EOS PCT %  --   --  1    < > = values in this interval not displayed.      Results from last 7 days   Lab Units 23  0445 23  1427 23  0433   SODIUM mmol/L 133*   < > 131*   POTASSIUM mmol/L 3.5   < > 3.2*   CHLORIDE mmol/L 88*   < > 87*   CO2 mmol/L 37*   < > 35*   BUN mg/dL 14   < > 10   CREATININE mg/dL 0.83   < > 0.78   ANION GAP mmol/L 8   < > 9   CALCIUM mg/dL 9.2   < > 9.0   ALBUMIN g/dL  --   --  3.9   TOTAL BILIRUBIN mg/dL  --   --  0.46   ALK PHOS U/L  --   --  118*   ALT U/L  --   --  156*   AST U/L  --   --  77*   GLUCOSE RANDOM mg/dL 93   < > 91    < > = values in this interval not displayed. Results from last 7 days   Lab Units 07/29/23  0155 07/28/23  2314 07/28/23  2101   LACTIC ACID mmol/L 2.0 2.1*  --    PROCALCITONIN ng/ml  --   --  0.09       Lines/Drains:  Invasive Devices     Peripheral Intravenous Line  Duration           Peripheral IV 08/01/23 Distal;Left;Upper;Ventral (anterior) Arm 3 days                Imaging: No pertinent imaging reviewed. Recent Cultures (last 7 days):   Results from last 7 days   Lab Units 07/29/23  0047   BLOOD CULTURE  No Growth After 5 Days. No Growth After 5 Days. Last 24 Hours Medication List:   Current Facility-Administered Medications   Medication Dose Route Frequency Provider Last Rate   • albuterol  2 puff Inhalation Q4H PRN Arnaldo Jauregui DO     • albuterol  2.5 mg Nebulization Q4H PRN Alejandra Palacios DO     • enoxaparin  40 mg Subcutaneous Q12H [address] Madi Downey MD     • FLUoxetine  10 mg Oral Daily Arnaldo Jauregui DO     • potassium chloride  40 mEq Oral Daily Alessandra Romo MD     • QUEtiapine  25 mg Oral HS Arnaldo Jauregui DO     • torsemide  30 mg Oral Daily Alessandra Romo MD          Today, Patient Was Seen By: Alessandra Romo MD    **Please Note: This note may have been constructed using a voice recognition system. **

## 2023-08-04 NOTE — PLAN OF CARE
Problem: PHYSICAL THERAPY ADULT  Goal: Performs mobility at highest level of function for planned discharge setting. See evaluation for individualized goals. Description: Treatment/Interventions: Functional transfer training, LE strengthening/ROM, Therapeutic exercise, Endurance training, Cognitive reorientation, Patient/family training, Equipment eval/education, Bed mobility, Gait training, Spoke to MD, Spoke to nursing (PT to see for elevations assessment and goal creation when appropriate)    Equipment Recommended:  (TBD pending progress)     See flowsheet documentation for full assessment, interventions and recommendations. Outcome: Progressing  Note: Prognosis: Fair  Problem List: Decreased strength, Decreased range of motion, Decreased endurance, Impaired balance, Decreased mobility, Impaired judgement, Decreased safety awareness, Impaired sensation, Decreased skin integrity  Assessment: pt began tx session seated OOB in the recliner and was agreeable to participate in PT intervention. pt continues to remain consistant with requiring min ax1 for all STS transfers to and from RW with VC's for hand placement and /s for completing SPT from EOB to recliner. pt was able to complete 5 STS in todays tx session all with min ax1 in order to strengthen LE's and increase safety and balance with all functional transfers. pt was able to increase activity tolerance and ambulation distance as pt ambulated 250'x1 RWwith min ax1 for safety and balance. Additional not possible due to fatigue, SOB and decreased Sp02 82% as pt required a seated therapeutic rest break. pt was able to participate in TE activities in recliner in order to strengthen LE's w/o increases in pain and good form throughout exercises. pt would benefit from continued skilled PT intervention in order to return to PLOF as pt was able to complete all functional transfers independently.  Continue to recommend post acute rehab services at the time of D/C in order to maximize pt functional independence and safety with all OOB mobility. Post tx pt in recliner with call bell, chair alarm activated and all pt needs met  Barriers to Discharge: Inaccessible home environment, Decreased caregiver support     PT Discharge Recommendation: Post acute rehabilitation services    See flowsheet documentation for full assessment.

## 2023-08-05 VITALS
HEART RATE: 88 BPM | HEIGHT: 76 IN | RESPIRATION RATE: 16 BRPM | SYSTOLIC BLOOD PRESSURE: 115 MMHG | DIASTOLIC BLOOD PRESSURE: 74 MMHG | OXYGEN SATURATION: 94 % | WEIGHT: 315 LBS | TEMPERATURE: 98.4 F | BODY MASS INDEX: 38.36 KG/M2

## 2023-08-05 PROBLEM — K59.00 CONSTIPATION: Status: ACTIVE | Noted: 2023-08-05

## 2023-08-05 PROBLEM — R06.2 WHEEZING: Status: ACTIVE | Noted: 2023-08-05

## 2023-08-05 LAB
ANION GAP SERPL CALCULATED.3IONS-SCNC: 7 MMOL/L
ANION GAP SERPL CALCULATED.3IONS-SCNC: 7 MMOL/L
BUN SERPL-MCNC: 13 MG/DL (ref 5–25)
BUN SERPL-MCNC: 13 MG/DL (ref 5–25)
CALCIUM SERPL-MCNC: 9 MG/DL (ref 8.4–10.2)
CALCIUM SERPL-MCNC: 9 MG/DL (ref 8.4–10.2)
CHLORIDE SERPL-SCNC: 87 MMOL/L (ref 96–108)
CHLORIDE SERPL-SCNC: 87 MMOL/L (ref 96–108)
CO2 SERPL-SCNC: 35 MMOL/L (ref 21–32)
CO2 SERPL-SCNC: 35 MMOL/L (ref 21–32)
CREAT SERPL-MCNC: 0.77 MG/DL (ref 0.6–1.3)
CREAT SERPL-MCNC: 0.77 MG/DL (ref 0.6–1.3)
GFR SERPL CREATININE-BSD FRML MDRD: 116 ML/MIN/1.73SQ M
GFR SERPL CREATININE-BSD FRML MDRD: 116 ML/MIN/1.73SQ M
GLUCOSE SERPL-MCNC: 89 MG/DL (ref 65–140)
GLUCOSE SERPL-MCNC: 89 MG/DL (ref 65–140)
POTASSIUM SERPL-SCNC: 3.5 MMOL/L (ref 3.5–5.3)
POTASSIUM SERPL-SCNC: 3.5 MMOL/L (ref 3.5–5.3)
SODIUM SERPL-SCNC: 129 MMOL/L (ref 135–147)
SODIUM SERPL-SCNC: 129 MMOL/L (ref 135–147)

## 2023-08-05 PROCEDURE — 80048 BASIC METABOLIC PNL TOTAL CA: CPT

## 2023-08-05 PROCEDURE — 99238 HOSP IP/OBS DSCHRG MGMT 30/<: CPT | Performed by: INTERNAL MEDICINE

## 2023-08-05 RX ORDER — ALBUTEROL SULFATE 90 UG/1
2 AEROSOL, METERED RESPIRATORY (INHALATION) EVERY 4 HOURS PRN
Refills: 0
Start: 2023-08-05 | End: 2023-08-11 | Stop reason: CLARIF

## 2023-08-05 RX ORDER — ALBUTEROL SULFATE 90 UG/1
2 AEROSOL, METERED RESPIRATORY (INHALATION) EVERY 4 HOURS PRN
Refills: 0
Start: 2023-08-05 | End: 2023-08-05 | Stop reason: SDUPTHER

## 2023-08-05 RX ORDER — AMOXICILLIN 250 MG
1 CAPSULE ORAL
Refills: 0
Start: 2023-08-05 | End: 2023-08-17

## 2023-08-05 RX ORDER — TORSEMIDE 10 MG/1
30 TABLET ORAL DAILY
Refills: 0
Start: 2023-08-06 | End: 2023-08-11 | Stop reason: CLARIF

## 2023-08-05 RX ORDER — POTASSIUM CHLORIDE 20 MEQ/1
40 TABLET, EXTENDED RELEASE ORAL DAILY
Refills: 0
Start: 2023-08-06 | End: 2023-08-05 | Stop reason: SDUPTHER

## 2023-08-05 RX ORDER — AMOXICILLIN 250 MG
1 CAPSULE ORAL
Refills: 0
Start: 2023-08-05 | End: 2023-08-05 | Stop reason: SDUPTHER

## 2023-08-05 RX ORDER — POLYETHYLENE GLYCOL 3350 17 G/17G
17 POWDER, FOR SOLUTION ORAL DAILY
Refills: 0
Start: 2023-08-06 | End: 2023-08-05 | Stop reason: SDUPTHER

## 2023-08-05 RX ORDER — TORSEMIDE 10 MG/1
30 TABLET ORAL DAILY
Refills: 0
Start: 2023-08-06 | End: 2023-08-05 | Stop reason: SDUPTHER

## 2023-08-05 RX ORDER — AMOXICILLIN 250 MG
1 CAPSULE ORAL
Status: DISCONTINUED | OUTPATIENT
Start: 2023-08-05 | End: 2023-08-05 | Stop reason: HOSPADM

## 2023-08-05 RX ORDER — POTASSIUM CHLORIDE 20 MEQ/1
40 TABLET, EXTENDED RELEASE ORAL DAILY
Refills: 0 | Status: ON HOLD
Start: 2023-08-06 | End: 2023-08-17 | Stop reason: SDUPTHER

## 2023-08-05 RX ORDER — POLYETHYLENE GLYCOL 3350 17 G/17G
17 POWDER, FOR SOLUTION ORAL DAILY
Refills: 0
Start: 2023-08-06 | End: 2023-08-11 | Stop reason: CLARIF

## 2023-08-05 RX ORDER — POLYETHYLENE GLYCOL 3350 17 G/17G
17 POWDER, FOR SOLUTION ORAL DAILY
Status: DISCONTINUED | OUTPATIENT
Start: 2023-08-05 | End: 2023-08-05 | Stop reason: HOSPADM

## 2023-08-05 RX ORDER — POTASSIUM CHLORIDE 20 MEQ/1
40 TABLET, EXTENDED RELEASE ORAL ONCE
Status: DISCONTINUED | OUTPATIENT
Start: 2023-08-05 | End: 2023-08-05 | Stop reason: HOSPADM

## 2023-08-05 RX ADMIN — POLYETHYLENE GLYCOL 3350 17 G: 17 POWDER, FOR SOLUTION ORAL at 08:55

## 2023-08-05 RX ADMIN — POTASSIUM CHLORIDE 40 MEQ: 1500 TABLET, EXTENDED RELEASE ORAL at 08:54

## 2023-08-05 RX ADMIN — TORSEMIDE 30 MG: 20 TABLET ORAL at 08:54

## 2023-08-05 RX ADMIN — ENOXAPARIN SODIUM 40 MG: 40 INJECTION SUBCUTANEOUS at 08:55

## 2023-08-05 RX ADMIN — FLUOXETINE 10 MG: 10 CAPSULE ORAL at 08:55

## 2023-08-05 NOTE — CASE MANAGEMENT
Case Management Discharge Planning Note    Patient name Lois Mendes  Location S /S -01 MRN [id number]  : 1985 Date 2023       Current Admission Date: 2023  Current Admission Diagnosis:(HFpEF) heart failure with preserved ejection fraction [facility])   Patient Active Problem List    Diagnosis Date Noted   • Constipation 2023   • Wheezing 2023   • Testicular cancer ([address]) 2023   • Obesity hypoventilation syndrome ([address]) 2023   • Fall 2023   • Hyponatremia 2023   • Hypokalemia 2023   • Bipolar I disorder ([address]) 2023   • (HFpEF) heart failure with preserved ejection fraction ([address]) 2023      LOS (days): 8  Geometric Mean LOS (GMLOS) (days):   Days to GMLOS:     OBJECTIVE:  Risk of Unplanned Readmission Score: 12.4         Current admission status: Inpatient   Preferred Pharmacy: No Pharmacies Listed  Primary Care Provider: Claudetta Master, MD    Primary Insurance: [employer] [facility]  Secondary Insurance:     DISCHARGE DETAILS:    Discharge planning discussed with[de-identified] Joselito Justice - Mother  Freedom of Choice: Yes  Comments - Freedom of Choice: Discussed DCP  CM contacted family/caregiver?: Yes  Were Treatment Team discharge recommendations reviewed with patient/caregiver?: Yes  Did patient/caregiver verbalize understanding of patient care needs?: N/A- going to facility  Were patient/caregiver advised of the risks associated with not following Treatment Team discharge recommendations?: Yes    Contacts  Patient Contacts: Daphne Dia (Mother)  Relationship to Patient[de-identified] Family  Contact Method: Phone  Phone Number: [phone] (M)  Reason/Outcome: Discharge Planning, Emergency Contact, Continuity of [address]         Is the patient interested in [facility] at discharge?: No    DME Referral Provided  Referral made for DME?: No    Other Referral/Resources/Interventions Provided:  Interventions: Acute Rehab, Short Term Rehab, Transportation         Treatment Team Recommendation: Acute Rehab  Discharge Destination Plan[de-identified] Acute Rehab  Transport at Discharge : [address]: Yes     Transported by [employer] and Unit #): [employer] [facility]  ETA of Transport (Date): 08/05/23  ETA of Transport (Time): 1300                          CM sent messages to the SNF facilities that were families preference to ensure that they are able to accept patient's insurance before offering them as a choice to family. While waiting for the response from these facilities CM received a message from Primary Nurse that Colette tamayo was trying to get in touch with CM. CM called liaison Osmin Bailey who reported that she spoke with patient's mother this morning again and they have changed their mind and would like to accept the bed at [facility]. She also reported that they have a bed available today if they are accepting as the insurance authorization is still valid. CM cancelled the SNF referrals made in [address] [place] [address]. CM called patient's mother Ken Arce who confirmed their choice of [facility]. CM then requested a [facility] transport via [employer] and this was claimed by [facility] for 2:00 PM.    SLIM, primary nurse, facility, patient and family all aware of the DCP and transport time. No further CM DC needs were discussed or identified.

## 2023-08-05 NOTE — PROGRESS NOTES
[address] [facility]  Progress Note  Name: Nitin Howard  MRN: [id number]  Unit/Bed#: S -01 I Date of Admission: 7/28/2023   Date of Service: 8/5/2023 I Hospital Day: 8    Assessment/Plan   * (HFpEF) heart failure with preserved ejection fraction [facility])  Assessment & Plan  · Volume overload on presentation  · Weight 140 kg at cardiology office visit 7/19/2023. · BNP 10. Chest x-ray did not show significant vascular congestion or pleural effusions. · Report of non-compliance with home medications per family. · Echo from 6/2/2023 revealed EF of 65%. No wall motion abnormalities. · Previously, 2-3+ pitting edema in the b/l LEs to upper shin   · Current on room air. Chest clear to auscultation. 1+ LE pitting edema. · Continues to have diet indiscretions with high salt snacks at bedside. · Plan:  · Continue home regimen: Torsemide continue INC to 30 mg daily, continue potassium INC to 40 mg daily. · Recommend standing weights. · I/Os   · Encourage patient to be compliant with pharmacotherapy outpatient as prescribed. · Counseled on avoiding high salt meals. · Lab holiday    Fall  Assessment & Plan  · Patient is a poor historian, etiology/mechanism of fall unclear. · Trauma work-up at the ED was unremarkable for any acute traumatic injuries. · MRI brain - -ve for acute findings. Has chronic microangiopathic changes. · Pt continues to be minimally verbal. Unknown etiology. Family reports he has been like this over the last 3 to 4 months. · Plan:  · Follow-up with neurosurgery outpatient. · PT/OT -recommending postacute rehab  · Case management assisting with placement    SIRS (systemic inflammatory response syndrome) (HCC)-resolved as of 7/31/2023  Assessment & Plan  · Met SIRS criteria initially (hypothermia, tachypnea, tachycardic. · Labs without leukocytosis. Lactic acid 2.1 --> 2.0. Normal WBC. Pro-Scott negative. UA negative. COVID-negative.   · Blood cultures -ve to date   · Source unclear, suspect most likely viral respiratory illness versus bacterial.   · Remains afebrile and HD stable     · Plan:  · Monitor off antibiotics and IVF   · Trend temperature     Obesity hypoventilation syndrome (HCC)  Assessment & Plan  · Patient denies snoring, PND. · VBG results: Respiratory acidosis with metabolic compensation. Normal pH. · Suspect could be due to obesity hypoventilation syndrome: BMI 42.78 or undiagnosed CROW   · Patient weaned off BiPAP on 7/29   · Currently On Room air     · Plan:  · BiPAP at bedtime - however, refusing inpatient   · Referral to Pulmonolgy at discharge sent, Sleep study as outpatient     Testicular cancer [facility])  Assessment & Plan  · Patient's chemotherapy regimen cisplatin, etoposide (3/20/2023 - 5/26/2023), discontinued due to side effects (double vision, weakness on the right side, labile affect), duration of therapy unknown. · Positive family history of cancer in his father who had lung and bone cancer. · Plan:  · Follow-up with heme-onc outpatient. Bipolar I disorder (HCC)  Assessment & Plan  · Home regimen Seroquel 25 mg and fluoxetine 10 mg daily. Patient reports being compliant with medication but girlfriend disagrees. · Plan:  · Continue home medications. · Encourage patient to be compliant with pharmacotherapy outpatient. Hypokalemia  Assessment & Plan  · Potassium 2.8, Magnesium 1.8 on admission  · Patient was prescribed 10 mEq potassium daily outpatient but unsure about compliance. · Potassium stable at 3.5 goal is >4      · Plan  · Monitor BMP = K+ replace as needed  · Continue home potassium, cont increased to 40 mEq daily  · Additional 40 K+ today     Hyponatremia  Assessment & Plan  · Sodium 128 on admission. · Etiology unclear, could be due to acute on chronic CHF, possible torsemide use, or SIADH secondary to psych meds (antipsychotics, SSRIs).  Appears euvolemic on exam, suspect 2/2 hypervolemic hyonatremia d/t CHF.  · DEC today, likely higher intake of fluids from foods/liquids brought from home by family. Even though patient is being FR inpt. · Plan:  · Cont torsemide  · Monitor Na  · CONT FR, counseled to avoid INC fluids intake     Hypothermia-resolved as of 2023  Assessment & Plan  · Temperature POA 90.3  · Of note: Temperatures were initially taken by Sheppard probe (possible falsely low measurements due to misplaced probe or malfunctioning). Later, switched to oral temperature after discontinuing bear hugger. · Resolved. VTE Pharmacologic Prophylaxis: VTE Score: 6 High Risk (Score >/= 5) - Pharmacological DVT Prophylaxis Ordered: enoxaparin (Lovenox). Sequential Compression Devices Ordered. Patient Centered Rounds: I performed bedside rounds with nursing staff today. Discussions with Specialists or Other Care Team Provider: Nursing, case management    Education and Discussions with Family / Patient: Attempted to update  (wife) via phone. Unable to contact. Current Length of Stay: 8 day(s)   Current Patient Status: Inpatient   Discharge Plan: Anticipate discharge in 48 hrs to Pending rehab placement, pending authorization. Patient is stable for discharge. Code Status: Level 1 - Full Code    Subjective:   No overnight events. Patient denied all symptoms including shortness of breath, chest pain, abdominal pain, difficulty urinating. Now has a condom cath. Has no complaints. Patient continues to be minimally verbal.      Objective:     Vitals:   Temp (24hrs), Av.8 °F (37.1 °C), Min:98.4 °F (36.9 °C), Max:99.3 °F (37.4 °C)    Temp:  [98.4 °F (36.9 °C)-99.3 °F (37.4 °C)] 98.4 °F (36.9 °C)  HR:  [] 88  Resp:  [16-18] 16  BP: (115-131)/(74-82) 115/74  SpO2:  [93 %-95 %] 94 %  Body mass index is 41.33 kg/m². Input and Output Summary (last 24 hours):      Intake/Output Summary (Last 24 hours) at 2023 1156  Last data filed at 2023 0830  Gross per 24 hour Intake 480 ml   Output --   Net 480 ml       Physical Exam:   Physical Exam  Vitals and nursing note reviewed. Constitutional:       General: He is not in acute distress. Appearance: Normal appearance. He is morbidly obese. He is not ill-appearing. Interventions: Nasal cannula in place. HENT:      Head: Normocephalic and atraumatic. Mouth/Throat:      Mouth: Mucous membranes are moist.      Pharynx: Oropharynx is clear. Eyes:      General: No scleral icterus. Conjunctiva/sclera: Conjunctivae normal.   Cardiovascular:      Rate and Rhythm: Normal rate and regular rhythm. Pulses: Normal pulses. Heart sounds: No murmur heard. No gallop. Pulmonary:      Effort: Pulmonary effort is normal. No respiratory distress. Breath sounds: No wheezing or rales. Abdominal:      General: Bowel sounds are normal. There is no distension. Palpations: Abdomen is soft. There is no mass. Tenderness: There is no abdominal tenderness. Musculoskeletal:         General: No tenderness. Normal range of motion. Cervical back: Normal range of motion and neck supple. No tenderness. Right lower le+ Pitting Edema present. Left lower le+ Pitting Edema present. Skin:     General: Skin is warm and dry. Capillary Refill: Capillary refill takes less than 2 seconds. Neurological:      Mental Status: He is alert and oriented to person, place, and time. Mental status is at baseline. Sensory: No sensory deficit.    Psychiatric:         Mood and Affect: Mood normal.         Behavior: Behavior normal.       Additional Data:     Labs:  Results from last 7 days   Lab Units 23  0432   WBC Thousand/uL 8.52   HEMOGLOBIN g/dL 12.6   HEMATOCRIT % 38.0   PLATELETS Thousands/uL 197     Results from last 7 days   Lab Units 23  0456   SODIUM mmol/L 129*   POTASSIUM mmol/L 3.5   CHLORIDE mmol/L 87*   CO2 mmol/L 35*   BUN mg/dL 13   CREATININE mg/dL 0.77 ANION GAP mmol/L 7   CALCIUM mg/dL 9.0   GLUCOSE RANDOM mg/dL 89                       Lines/Drains:  Invasive Devices     Peripheral Intravenous Line  Duration           Peripheral IV 08/05/23 Left;Ventral (anterior) Forearm <1 day                Imaging: No pertinent imaging reviewed. Recent Cultures (last 7 days):         Last 24 Hours Medication List:   Current Facility-Administered Medications   Medication Dose Route Frequency Provider Last Rate   • albuterol  2 puff Inhalation Q4H PRN Arnaldo Jauregui DO     • albuterol  2.5 mg Nebulization Q4H PRN Veronika Hoyt DO     • enoxaparin  40 mg Subcutaneous Q12H [address] Sherryle Lacrosse, MD     • FLUoxetine  10 mg Oral Daily Arnaldo Jauregui DO     • polyethylene glycol  17 g Oral Daily Mckenzie Gonzalez MD     • potassium chloride  40 mEq Oral Daily Mckenzie Gonzalez MD     • potassium chloride  40 mEq Oral Once Mckenzie Gonzalez MD     • QUEtiapine  25 mg Oral HS Arnaldo Jauregui DO     • senna-docusate sodium  1 tablet Oral HS Mckenzie Gonzalez MD     • torsemide  30 mg Oral Daily Mckenzie Gonzalez MD          Today, Patient Was Seen By: Mckenzie Gonzalez MD    **Please Note: This note may have been constructed using a voice recognition system. **

## 2023-08-05 NOTE — INCIDENTAL FINDINGS
The following findings require follow up:  Radiographic finding   Finding: Retroperitoneal nodes, 1 stable and one smaller.  Attention is needed on follow-up exam in view of patient's history of testicular neoplasm     Follow up required: CT A/ P    Follow up should be done within 3 month(s)    Please notify the following clinician to assist with the follow up:   Dr. Red Walton MD

## 2023-08-05 NOTE — DISCHARGE SUMMARY
[address] [facility]  Discharge- Mendez Nettles 1985, 41 y.o. male MRN: [id number]  Unit/Bed#: S -01 Encounter: [id number]  Primary Care Provider: Polly Barragan MD   Date and time admitted to hospital: 7/28/2023  6:13 PM    * (HFpEF) heart failure with preserved ejection fraction [facility])  Assessment & Plan  · Volume overload on presentation  · Weight 140 kg at cardiology office visit 7/19/2023. · BNP 10. Chest x-ray did not show significant vascular congestion or pleural effusions. · Report of non-compliance with home medications per family. · Echo from 6/2/2023 revealed EF of 65%. No wall motion abnormalities. · Previously, 2-3+ pitting edema in the b/l LEs to upper shin   · Current on room air. Chest clear to auscultation. 1+ LE pitting edema. · Continues to have diet indiscretions with high salt snacks at bedside. · Plan:  · Continue regimen: Torsemide 30 mg daily, continue potassium 40 mg daily. · Daily standing weights. · Encourage patient to be compliant with pharmacotherapy outpatient as prescribed. Also discussed diet recommendations with patient's mother. Counseled on avoiding high salt meals. · Check BMP 1 week after DC   · Outpatient follow-up with cardiology    Cha Mcintyre  · Patient is a poor historian, etiology/mechanism of fall unclear. · Trauma work-up at the ED was unremarkable for any acute traumatic injuries. · MRI brain - -ve for acute findings. Has chronic microangiopathic changes. · Pt continues to be minimally verbal. Unknown etiology. Family reports he has been like this over the last 3 to 4 months. · Plan:  · Follow-up with neurosurgery outpatient. · PT/OT -recommending postacute rehab  · Case management assisting with placement -acute rehab at [facility]    SIRS (systemic inflammatory response syndrome) (HCC)-resolved as of 7/31/2023  Assessment & Plan  · Met SIRS criteria initially (hypothermia, tachypnea, tachycardic. · Labs without leukocytosis. Lactic acid 2.1 --> 2.0. Normal WBC. Pro-Scott negative. UA negative. COVID-negative. · Blood cultures -ve to date   · Source unclear, suspect most likely viral respiratory illness versus bacterial.   · Remains afebrile and HD stable   · Resolved    Constipation  Assessment & Plan  · Continue with daily MiraLAX and daily Senokot    Obesity hypoventilation syndrome ([address])  Assessment & Plan  · Patient denies snoring, PND. · VBG results: Respiratory acidosis with metabolic compensation. Normal pH. · Suspect could be due to obesity hypoventilation syndrome: BMI 42.78 or undiagnosed CROW   · Patient weaned off BiPAP on 7/29   · Currently On Room air   · Ordered BiPAP at bedtime while inpatient- however, refusing     · Plan:  · Referral to Pulmonolgy at discharge sent, Sleep study as outpatient     Testicular cancer [facility])  Assessment & Plan  · Patient's chemotherapy regimen cisplatin, etoposide (3/20/2023 - 5/26/2023), discontinued due to side effects (double vision, weakness on the right side, labile affect), duration of therapy unknown. · Positive family history of cancer in his father who had lung and bone cancer. · Plan:  · Follow-up with heme-onc outpatient. Bipolar I disorder (HCC)  Assessment & Plan  · Home regimen Seroquel 25 mg and fluoxetine 10 mg daily. Patient reports being compliant with medication but girlfriend disagrees. · Plan:  · Continue home medications. · Encourage patient to be compliant with pharmacotherapy outpatient. Hypokalemia  Assessment & Plan  · Potassium 2.8, Magnesium 1.8 on admission  · Patient was prescribed 10 mEq potassium daily outpatient but unsure about compliance. · Potassium stable at 3.5 goal is >4      · Plan  · Continue 40 mEq of potassium daily  · BMP in 1 week after discharge    Hyponatremia  Assessment & Plan  · Sodium 128 on admission.   · Etiology unclear, could be due to acute on chronic CHF, possible torsemide use, or SIADH secondary to psych meds (antipsychotics, SSRIs). Appears euvolemic on exam, suspect 2/2 hypervolemic hyonatremia d/t CHF. · DEC today, likely higher intake of fluids from foods/liquids brought from home by family. Even though patient is being FR inpt. · Plan:  · Continue with fluid restriction. Counseled to avoid INC fluids intake     Hypothermia-resolved as of 7/31/2023  Assessment & Plan  · Temperature POA 90.3  · Of note: Temperatures were initially taken by Sheppard probe (possible falsely low measurements due to misplaced probe or malfunctioning). Later, switched to oral temperature after discontinuing bear hugger. · Resolved. Medical Problems     Resolved Problems  Date Reviewed: 8/5/2023          Resolved    SIRS (systemic inflammatory response syndrome) ([address]) 7/31/2023     Resolved by  Magui Tamayo MD        Discharging Resident: Magui Tamayo MD  Discharging Attending: Piper Andre MD  PCP: Liset Reyes MD  Admission Date:   Admission Orders (From admission, onward)     Ordered        07/28/23 1948  INPATIENT ADMISSION  Once                      Discharge Date: 08/05/23    Consultations During Hospital Stay:  · None    Procedures Performed:   · None    Significant Findings / Test Results:   XR chest 1 view    Result Date: 7/31/2023  Impression: Hypoinflated lungs, limiting evaluation. Likely atelectasis at the lung bases. No evidence of pneumothorax. No obvious displaced fractures within limitation of supine AP chest technique. Workstation performed: [id number]     MRI brain w wo contrast    Result Date: 7/31/2023  Impression: No acute intracranial abnormality. No enhancing intracranial metastasis given mild motion artifact on postcontrast sequences Mild chronic microangiopathy. Workstation performed: [id number]     CT chest abdomen pelvis w contrast    Result Date: 7/29/2023  Impression: 1. Low lung volumes with bilateral lower lobe atelectasis versus infiltrates. Image degradation due to respiratory motion. 2. Severe hepatic steatosis. 3. Fat-containing umbilical hernia with no complications. 4. Retroperitoneal nodes, 1 stable and one smaller. Attention is needed on follow-up exam in view of patient's history of testicular neoplasm. The study was marked in [facility] for immediate notification. . Workstation performed: [id number]     XR Trauma multiple ([facility] trauma bay ONLY)    Result Date: 7/28/2023  Impression: Hypoinflated lungs, limiting evaluation. Likely atelectasis at the lung bases. No evidence of pneumothorax. No obvious displaced fractures within limitation of supine AP chest technique. Workstation performed: [id number]     TRAUMA - CT head wo contrast    Result Date: 7/28/2023  Impression: No acute intracranial pathology. In particular, no intracranial hemorrhage or calvarial fracture. Slight decreased attenuation in the periventricular and subcortical white matter as on prior studies, better delineated on recent brain MRI. I personally discussed this study with Dr. Rosita Hernandez on 7/28/2023 at 7:02 PM. Workstation performed: [id number]     Incidental Findings:   · Retroperitoneal nodes noted on CT abdomen pelvis. Attention is needed on follow-up exam given history of testicular neoplasm. Consider repeat CT A/P within 3 months per PCP. Test Results Pending at Discharge (will require follow up): · None     Outpatient Tests Requested:  · Should obtain BMP within 1 week per PCP/SNF facility    Complications: None    Reason for Admission: [facility] Course:   Tangela Longoria is a 41 y.o. male patient who originally presented to the hospital on 7/28/2023 due to fall. On presentation patient reported shortness of breath and was significantly volume overloaded likely secondary to noncompliance with heart failure medications. Patient was also noted to be encephalopathic and family reported some decrease activity.   Likely cause of fall was mechanical given lower extremity swelling. Evaluations did not find any source of infection and patient's confusion likely associated to electrolyte derangements/metabolic etiology. Patient was appropriately diuresed with home torsemide which was adjusted and increased to 30 mg daily. Patient responded well to diuresis. Initially did require some oxygen, remained on 2 L for some days and then was weaned off to room air. Persistently hypokalemic and therefore home potassium was increased to 40 equivalents daily. Patient has frequent dietary indiscretions including while inpatient with increased fluid intake and increased sodium intake from snacks and drinks brought by family, and therefore was discussed with patient and family that he should avoid increased sodium intake, to limit sodium intake to 2 g/day and to limit fluid intake to 2 L/day. Patient was evaluated by PT/OT and was recommended for acute rehab. Was arranged for rehab placement to [facility]. On evaluation, patient was stable and was cleared for discharge to rehab. Please see above list of diagnoses and related plan for additional information. Condition at Discharge: stable    Discharge Day Visit / Exam:   * Please refer to separate progress note for these details *    Discussion with Family: Updated  (mother) via phone. Discharge instructions/Information to patient and family:   See after visit summary for information provided to patient and family. Provisions for Follow-Up Care:  See after visit summary for information related to follow-up care and any pertinent home health orders. Disposition:   Acute Rehab at [facility]    Planned Readmission: None    Discharge Medications:  See after visit summary for reconciled discharge medications provided to patient and/or family.       **Please Note: This note may have been constructed using a voice recognition system**

## 2023-08-07 NOTE — UTILIZATION REVIEW
NOTIFICATION OF ADMISSION DISCHARGE   This is a Notification of Discharge from [address] [place] [address]. Please be advised that this patient has been discharge from our facility. Below you will find the admission and discharge date and time including the patient’s disposition. UTILIZATION REVIEW CONTACT:  Oswaldo Hooker MA  Utilization   Network Utilization Review Department  Phone: [phone] x carefully listen to the prompts. All voicemails are confidential.  Email: [email or web address]     ADMISSION INFORMATION  PRESENTATION DATE: 7/28/2023  6:13 PM  OBERVATION ADMISSION DATE:   INPATIENT ADMISSION DATE: 7/28/23  7:48 PM   DISCHARGE DATE: 8/5/2023  2:55 PM   DISPOSITION:Non [facility] Acute Rehab    IMPORTANT INFORMATION:  Send all requests for admission clinical reviews, approved or denied determinations and any other requests to dedicated fax number below belonging to the campus where the patient is receiving treatment.  List of dedicated fax numbers:  [place] DENIALS (Administrative/Medical Necessity) [phone] [address] [facility] (Maternity/NICU/Pediatrics) [phone]   [facility] [phone]   [facility] [phone] [address] [phone]   [address] [place] [address] [phone]   [facility] [phone]   [address] [facility] [address] [facility] [phone]   [address] [facility] [phone] [address] [facility] [phone] 27[address] [facility] [address] [facility] [phone]

## 2023-08-11 ENCOUNTER — APPOINTMENT (EMERGENCY)
Dept: RADIOLOGY | Facility: HOSPITAL | Age: 38
DRG: 421 | End: 2023-08-11
Payer: COMMERCIAL

## 2023-08-11 ENCOUNTER — HOSPITAL ENCOUNTER (INPATIENT)
Facility: HOSPITAL | Age: 38
LOS: 6 days | Discharge: HOME HEALTH | DRG: 421 | End: 2023-08-17
Attending: EMERGENCY MEDICINE | Admitting: INTERNAL MEDICINE
Payer: COMMERCIAL

## 2023-08-11 DIAGNOSIS — C62.90 TESTICULAR CANCER (HCC): ICD-10-CM

## 2023-08-11 DIAGNOSIS — I50.33 ACUTE ON CHRONIC HEART FAILURE WITH PRESERVED EJECTION FRACTION (HCC): ICD-10-CM

## 2023-08-11 DIAGNOSIS — I50.21 ACUTE SYSTOLIC CONGESTIVE HEART FAILURE (HCC): ICD-10-CM

## 2023-08-11 DIAGNOSIS — R40.0 SOMNOLENCE: ICD-10-CM

## 2023-08-11 DIAGNOSIS — F06.1 CATATONIC REACTION: ICD-10-CM

## 2023-08-11 DIAGNOSIS — F32.A DEPRESSION: Chronic | ICD-10-CM

## 2023-08-11 DIAGNOSIS — G93.40 ACUTE ENCEPHALOPATHY: Primary | ICD-10-CM

## 2023-08-11 DIAGNOSIS — G93.40 ENCEPHALOPATHY: ICD-10-CM

## 2023-08-11 DIAGNOSIS — Z72.0 TOBACCO USE: ICD-10-CM

## 2023-08-11 LAB
ALBUMIN SERPL BCP-MCNC: 3.4 G/DL (ref 3.5–5)
ALP SERPL-CCNC: 133 U/L (ref 46–116)
ALT SERPL W P-5'-P-CCNC: 172 U/L (ref 12–78)
AMMONIA PLAS-SCNC: 21 UMOL/L (ref 11–35)
ANION GAP SERPL CALCULATED.3IONS-SCNC: 7 MMOL/L
AST SERPL W P-5'-P-CCNC: 77 U/L (ref 5–45)
BASE EX.OXY STD BLDV CALC-SCNC: 81.5 % (ref 60–80)
BASE EXCESS BLDV CALC-SCNC: 4.6 MMOL/L
BASOPHILS # BLD AUTO: 0.04 THOUSANDS/ÂΜL (ref 0–0.1)
BASOPHILS NFR BLD AUTO: 0 % (ref 0–1)
BILIRUB SERPL-MCNC: 0.53 MG/DL (ref 0.2–1)
BILIRUB UR QL STRIP: NEGATIVE
BUN SERPL-MCNC: 8 MG/DL (ref 5–25)
CALCIUM ALBUM COR SERPL-MCNC: 9.8 MG/DL (ref 8.3–10.1)
CALCIUM SERPL-MCNC: 9.3 MG/DL (ref 8.3–10.1)
CHLORIDE SERPL-SCNC: 90 MMOL/L (ref 96–108)
CLARITY UR: CLEAR
CO2 SERPL-SCNC: 32 MMOL/L (ref 21–32)
COLOR UR: COLORLESS
CREAT SERPL-MCNC: 0.84 MG/DL (ref 0.6–1.3)
EOSINOPHIL # BLD AUTO: 0.09 THOUSAND/ÂΜL (ref 0–0.61)
EOSINOPHIL NFR BLD AUTO: 1 % (ref 0–6)
ERYTHROCYTE [DISTWIDTH] IN BLOOD BY AUTOMATED COUNT: 14.5 % (ref 11.6–15.1)
ETHANOL SERPL-MCNC: <3 MG/DL (ref 0–3)
GFR SERPL CREATININE-BSD FRML MDRD: 112 ML/MIN/1.73SQ M
GLUCOSE SERPL-MCNC: 88 MG/DL (ref 65–140)
GLUCOSE SERPL-MCNC: 89 MG/DL (ref 65–140)
GLUCOSE UR STRIP-MCNC: NEGATIVE MG/DL
HCO3 BLDV-SCNC: 31.8 MMOL/L (ref 24–30)
HCT VFR BLD AUTO: 37.9 % (ref 36.5–49.3)
HGB BLD-MCNC: 12.7 G/DL (ref 12–17)
HGB UR QL STRIP.AUTO: NEGATIVE
IMM GRANULOCYTES # BLD AUTO: 0.05 THOUSAND/UL (ref 0–0.2)
IMM GRANULOCYTES NFR BLD AUTO: 1 % (ref 0–2)
KETONES UR STRIP-MCNC: NEGATIVE MG/DL
LACTATE SERPL-SCNC: 1.1 MMOL/L (ref 0.5–2)
LEUKOCYTE ESTERASE UR QL STRIP: NEGATIVE
LYMPHOCYTES # BLD AUTO: 1.53 THOUSANDS/ÂΜL (ref 0.6–4.47)
LYMPHOCYTES NFR BLD AUTO: 17 % (ref 14–44)
MCH RBC QN AUTO: 32.4 PG (ref 26.8–34.3)
MCHC RBC AUTO-ENTMCNC: 33.5 G/DL (ref 31.4–37.4)
MCV RBC AUTO: 97 FL (ref 82–98)
MONOCYTES # BLD AUTO: 0.84 THOUSAND/ÂΜL (ref 0.17–1.22)
MONOCYTES NFR BLD AUTO: 9 % (ref 4–12)
NEUTROPHILS # BLD AUTO: 6.5 THOUSANDS/ÂΜL (ref 1.85–7.62)
NEUTS SEG NFR BLD AUTO: 72 % (ref 43–75)
NITRITE UR QL STRIP: NEGATIVE
NRBC BLD AUTO-RTO: 0 /100 WBCS
O2 CT BLDV-SCNC: 15.5 ML/DL
PCO2 BLDV: 58.3 MM HG (ref 42–50)
PH BLDV: 7.35 [PH] (ref 7.3–7.4)
PH UR STRIP.AUTO: 6.5 [PH]
PLATELET # BLD AUTO: 210 THOUSANDS/UL (ref 149–390)
PMV BLD AUTO: 10.6 FL (ref 8.9–12.7)
PO2 BLDV: 55.8 MM HG (ref 35–45)
POTASSIUM SERPL-SCNC: 3.6 MMOL/L (ref 3.5–5.3)
PROCALCITONIN SERPL-MCNC: 0.11 NG/ML
PROT SERPL-MCNC: 7.5 G/DL (ref 6.4–8.4)
PROT UR STRIP-MCNC: NEGATIVE MG/DL
RBC # BLD AUTO: 3.92 MILLION/UL (ref 3.88–5.62)
SODIUM SERPL-SCNC: 129 MMOL/L (ref 135–147)
SP GR UR STRIP.AUTO: 1.02 (ref 1–1.03)
TSH SERPL DL<=0.05 MIU/L-ACNC: 2.42 UIU/ML (ref 0.45–4.5)
UROBILINOGEN UR STRIP-ACNC: <2 MG/DL
WBC # BLD AUTO: 9.05 THOUSAND/UL (ref 4.31–10.16)

## 2023-08-11 PROCEDURE — 99285 EMERGENCY DEPT VISIT HI MDM: CPT | Performed by: EMERGENCY MEDICINE

## 2023-08-11 PROCEDURE — 99285 EMERGENCY DEPT VISIT HI MDM: CPT

## 2023-08-11 PROCEDURE — 36415 COLL VENOUS BLD VENIPUNCTURE: CPT | Performed by: STUDENT IN AN ORGANIZED HEALTH CARE EDUCATION/TRAINING PROGRAM

## 2023-08-11 PROCEDURE — 84157 ASSAY OF PROTEIN OTHER: CPT | Performed by: EMERGENCY MEDICINE

## 2023-08-11 PROCEDURE — 87070 CULTURE OTHR SPECIMN AEROBIC: CPT | Performed by: EMERGENCY MEDICINE

## 2023-08-11 PROCEDURE — 82805 BLOOD GASES W/O2 SATURATION: CPT | Performed by: STUDENT IN AN ORGANIZED HEALTH CARE EDUCATION/TRAINING PROGRAM

## 2023-08-11 PROCEDURE — 87476 LYME DIS DNA AMP PROBE: CPT | Performed by: EMERGENCY MEDICINE

## 2023-08-11 PROCEDURE — 82140 ASSAY OF AMMONIA: CPT | Performed by: STUDENT IN AN ORGANIZED HEALTH CARE EDUCATION/TRAINING PROGRAM

## 2023-08-11 PROCEDURE — 62270 DX LMBR SPI PNXR: CPT | Performed by: EMERGENCY MEDICINE

## 2023-08-11 PROCEDURE — 86618 LYME DISEASE ANTIBODY: CPT | Performed by: STUDENT IN AN ORGANIZED HEALTH CARE EDUCATION/TRAINING PROGRAM

## 2023-08-11 PROCEDURE — 82077 ASSAY SPEC XCP UR&BREATH IA: CPT | Performed by: STUDENT IN AN ORGANIZED HEALTH CARE EDUCATION/TRAINING PROGRAM

## 2023-08-11 PROCEDURE — 74177 CT ABD & PELVIS W/CONTRAST: CPT

## 2023-08-11 PROCEDURE — 82945 GLUCOSE OTHER FLUID: CPT | Performed by: EMERGENCY MEDICINE

## 2023-08-11 PROCEDURE — 87102 FUNGUS ISOLATION CULTURE: CPT | Performed by: EMERGENCY MEDICINE

## 2023-08-11 PROCEDURE — 80053 COMPREHEN METABOLIC PANEL: CPT | Performed by: STUDENT IN AN ORGANIZED HEALTH CARE EDUCATION/TRAINING PROGRAM

## 2023-08-11 PROCEDURE — 99223 1ST HOSP IP/OBS HIGH 75: CPT | Performed by: INTERNAL MEDICINE

## 2023-08-11 PROCEDURE — 84443 ASSAY THYROID STIM HORMONE: CPT | Performed by: STUDENT IN AN ORGANIZED HEALTH CARE EDUCATION/TRAINING PROGRAM

## 2023-08-11 PROCEDURE — 82948 REAGENT STRIP/BLOOD GLUCOSE: CPT

## 2023-08-11 PROCEDURE — 70450 CT HEAD/BRAIN W/O DYE: CPT

## 2023-08-11 PROCEDURE — G1004 CDSM NDSC: HCPCS

## 2023-08-11 PROCEDURE — 84145 PROCALCITONIN (PCT): CPT | Performed by: STUDENT IN AN ORGANIZED HEALTH CARE EDUCATION/TRAINING PROGRAM

## 2023-08-11 PROCEDURE — 87449 NOS EACH ORGANISM AG IA: CPT | Performed by: EMERGENCY MEDICINE

## 2023-08-11 PROCEDURE — 71260 CT THORAX DX C+: CPT

## 2023-08-11 PROCEDURE — 93005 ELECTROCARDIOGRAM TRACING: CPT

## 2023-08-11 PROCEDURE — 81003 URINALYSIS AUTO W/O SCOPE: CPT | Performed by: STUDENT IN AN ORGANIZED HEALTH CARE EDUCATION/TRAINING PROGRAM

## 2023-08-11 PROCEDURE — 87483 CNS DNA AMP PROBE TYPE 12-25: CPT | Performed by: EMERGENCY MEDICINE

## 2023-08-11 PROCEDURE — 86618 LYME DISEASE ANTIBODY: CPT | Performed by: EMERGENCY MEDICINE

## 2023-08-11 PROCEDURE — 89050 BODY FLUID CELL COUNT: CPT | Performed by: EMERGENCY MEDICINE

## 2023-08-11 PROCEDURE — 83605 ASSAY OF LACTIC ACID: CPT | Performed by: STUDENT IN AN ORGANIZED HEALTH CARE EDUCATION/TRAINING PROGRAM

## 2023-08-11 PROCEDURE — 89051 BODY FLUID CELL COUNT: CPT | Performed by: EMERGENCY MEDICINE

## 2023-08-11 PROCEDURE — 85025 COMPLETE CBC W/AUTO DIFF WBC: CPT | Performed by: STUDENT IN AN ORGANIZED HEALTH CARE EDUCATION/TRAINING PROGRAM

## 2023-08-11 RX ORDER — SODIUM CHLORIDE, SODIUM GLUCONATE, SODIUM ACETATE, POTASSIUM CHLORIDE, MAGNESIUM CHLORIDE, SODIUM PHOSPHATE, DIBASIC, AND POTASSIUM PHOSPHATE .53; .5; .37; .037; .03; .012; .00082 G/100ML; G/100ML; G/100ML; G/100ML; G/100ML; G/100ML; G/100ML
125 INJECTION, SOLUTION INTRAVENOUS CONTINUOUS
Status: DISCONTINUED | OUTPATIENT
Start: 2023-08-11 | End: 2023-08-12

## 2023-08-11 RX ORDER — ENOXAPARIN SODIUM 100 MG/ML
40 INJECTION SUBCUTANEOUS EVERY 12 HOURS SCHEDULED
Status: DISCONTINUED | OUTPATIENT
Start: 2023-08-11 | End: 2023-08-18 | Stop reason: HOSPADM

## 2023-08-11 RX ORDER — ALBUTEROL SULFATE 90 UG/1
2 AEROSOL, METERED RESPIRATORY (INHALATION) EVERY 6 HOURS PRN
Status: CANCELLED | OUTPATIENT
Start: 2023-08-11

## 2023-08-11 RX ORDER — ONDANSETRON 2 MG/ML
4 INJECTION INTRAMUSCULAR; INTRAVENOUS EVERY 6 HOURS PRN
Status: DISCONTINUED | OUTPATIENT
Start: 2023-08-11 | End: 2023-08-18 | Stop reason: HOSPADM

## 2023-08-11 RX ORDER — LIDOCAINE HYDROCHLORIDE 10 MG/ML
10 INJECTION, SOLUTION EPIDURAL; INFILTRATION; INTRACAUDAL; PERINEURAL ONCE
Status: COMPLETED | OUTPATIENT
Start: 2023-08-11 | End: 2023-08-11

## 2023-08-11 RX ORDER — ALBUTEROL SULFATE 90 UG/1
2 AEROSOL, METERED RESPIRATORY (INHALATION) EVERY 4 HOURS PRN
COMMUNITY
End: 2023-08-20 | Stop reason: SDUPTHER

## 2023-08-11 RX ADMIN — ENOXAPARIN SODIUM 40 MG: 40 INJECTION SUBCUTANEOUS at 23:47

## 2023-08-11 RX ADMIN — IOHEXOL 100 ML: 350 INJECTION, SOLUTION INTRAVENOUS at 19:52

## 2023-08-11 RX ADMIN — SODIUM CHLORIDE, SODIUM GLUCONATE, SODIUM ACETATE, POTASSIUM CHLORIDE AND MAGNESIUM CHLORIDE 125 ML/HR: 526; 502; 368; 37; 30 INJECTION, SOLUTION INTRAVENOUS at 23:42

## 2023-08-11 RX ADMIN — LIDOCAINE HYDROCHLORIDE 10 ML: 10 INJECTION, SOLUTION EPIDURAL; INFILTRATION; INTRACAUDAL at 21:02

## 2023-08-11 NOTE — ED PROVIDER NOTES
History  Chief Complaint   Patient presents with   • Altered Mental Status     Pt from rehab facility - presents with unresponsiveness. Staff reported to EMS that he has been unresponsive since 11am. Pt hasnt been wearing cpap machine, and after attempts to use it on him without improvement - called ems. 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  Patient came from [facility] and was reported to have increased lethargy as the morning when on. Per EMS he was placed on BiPAP and was still unresponsive and was then brought here via EMS. Upon arrival he is unresponsive and unable to to communicate. EMS fingerstick glucose on route was 88. Patient responsive to noxious stimuli only. EMS states he feels cold, breathing spontaneously and saturating 100% on supplemental oxygen. Prior to Admission Medications   Prescriptions Last Dose Informant Patient Reported? Taking? Enoxaparin Sodium (LOVENOX SC) Unknown  Yes No   Sig: Inject 40 mg under the skin every 12 (twelve) hours   FLUoxetine (PROzac) 10 mg capsule Unknown  Yes No   Sig: Take 10 mg by mouth daily   QUEtiapine (SEROquel) 25 mg tablet Unknown  Yes No   Sig: Take 25 mg by mouth daily at bedtime   albuterol (PROVENTIL HFA,VENTOLIN HFA) 90 mcg/act inhaler Unknown  Yes No   Sig: Inhale 2 puffs every 4 (four) hours as needed for wheezing   albuterol (ProAir HFA) 90 mcg/act inhaler Unknown  No No   Sig: Inhale 2 puffs every 6 (six) hours as needed for wheezing   potassium chloride (K-DUR,KLOR-CON) 20 mEq tablet Unknown  No No   Sig: Take 2 tablets (40 mEq total) by mouth daily Do not start before August 6, 2023.    Patient taking differently: Take 40 mEq by mouth 2 (two) times a day before breakfast and lunch   senna-docusate sodium (SENOKOT S) 8.6-50 mg per tablet Unknown  No No   Sig: Take 1 tablet by mouth daily at bedtime   torsemide (DEMADEX) 20 mg tablet Unknown  No No   Sig: Take 1 tablet (20 mg total) by mouth daily      Facility-Administered Medications: None       Past Medical History:   Diagnosis Date   • Anxiety    • Cancer ([address])    • Depression    • Psychiatric disorder     bipolar       Past Surgical History:   Procedure Laterality Date   • IR BIOPSY LYMPH NODE  1/20/2023   • IR PORT PLACEMENT  3/14/2023   • ORCHIECTOMY Left 12/14/2022    Procedure: ORCHIECTOMY INGUINAL;  Surgeon: Sofia Ansari MD;  Location: [facility];  Service: Urology       Family History   Problem Relation Age of Onset   • Coronary artery disease Maternal Aunt      I have reviewed and agree with the history as documented. E-Cigarette/Vaping   • E-Cigarette Use Never User      E-Cigarette/Vaping Substances     Social History     Tobacco Use   • Smokeless tobacco: Never   Vaping Use   • Vaping Use: Never used   Substance Use Topics   • Alcohol use: Not Currently     Comment: 2 cases of beer daily, stopped 3 years ago   • Drug use: Yes     Types: Marijuana     Comment: Medical marijuana        Review of Systems   Constitutional: Positive for activity change. Negative for chills and fever. Respiratory: Negative for wheezing and stridor. Gastrointestinal: Negative for vomiting. Genitourinary: Negative for decreased urine volume. Increased urinary frequency   Skin: Positive for wound. Neurological: Negative for tremors and seizures.        Physical Exam  ED Triage Vitals   Temperature Pulse Respirations Blood Pressure SpO2   08/11/23 1642 08/11/23 1625 08/11/23 1625 08/11/23 1625 08/11/23 1625   (!) 96.1 °F (35.6 °C) 65 12 142/93 100 %      Temp Source Heart Rate Source Patient Position - Orthostatic VS BP Location FiO2 (%) 08/11/23 1642 08/11/23 1625 08/11/23 1907 08/11/23 1625 --   Rectal Monitor Sitting Left arm       Pain Score       08/12/23 0327       Med Not Given for Pain - for MAR use only             Orthostatic Vital Signs  Vitals:    08/12/23 1300 08/12/23 1400 08/12/23 1500 08/12/23 1600   BP: 100/61 109/65 104/57 127/68   Pulse: 62 66 68 72   Patient Position - Orthostatic VS:           Physical Exam  Vitals reviewed. Constitutional:       Comments: Responsive only to noxious stimuli, snoring, appears comfortable in bed   HENT:      Head: Normocephalic and atraumatic. Eyes:      Comments: Patient forcibly closes eyes during ophthalmologic examination. Limited review of pupils but they appear dilated. Cardiovascular:      Rate and Rhythm: Normal rate and regular rhythm. Heart sounds: Normal heart sounds. No murmur heard. No friction rub. No gallop. Comments: Port noted right upper chest  Pulmonary:      Effort: Pulmonary effort is normal. No respiratory distress. Breath sounds: Normal breath sounds. No stridor. No wheezing, rhonchi or rales. Abdominal:      Comments: Abdominal exam limited due to body habitus and responsiveness of the patient. Rotund, large umbilical hernia noted   Skin:     General: Skin is warm and dry. Comments: Stage II decubitus ulcer noted with clean dressing in place. Clean, on soiled diaper on patient upon arrival.  No cellulitis or infection noted in the genitals, perineum, or anus. Rectal temperature obtained at 96.1. No other rash or evidence for skin infection noted on the patient. Neurological:      Mental Status: He is unresponsive. GCS: GCS eye subscore is 1. GCS verbal subscore is 1. GCS motor subscore is 3.          ED Medications  Medications   ondansetron (ZOFRAN) injection 4 mg (has no administration in time range)   enoxaparin (LOVENOX) subcutaneous injection 40 mg (40 mg Subcutaneous Given 8/12/23 0824)   Famotidine (PF) (PEPCID) injection 20 mg (20 mg Intravenous Given 8/12/23 0825)   senna (SENOKOT) tablet 8.6 mg ( Oral MAR Unhold 8/12/23 0257)   fentanyl citrate (PF) 100 MCG/2ML 50 mcg (50 mcg Intravenous Given 8/12/23 0327)   chlorhexidine (PERIDEX) 0.12 % oral rinse 15 mL (15 mL Mouth/Throat Given 8/12/23 0824)   FLUoxetine (PROzac) capsule 10 mg (10 mg Oral Given 8/12/23 0943)   thiamine (VITAMIN B1) 500 mg in sodium chloride 0.9 % 50 mL IVPB (0 mg Intravenous Stopped 8/12/23 1530)   iohexol (OMNIPAQUE) 350 MG/ML injection (SINGLE-DOSE) 100 mL (100 mL Intravenous Given 8/11/23 1952)   lidocaine (PF) (XYLOCAINE-MPF) 1 % injection 10 mL (10 mL Infiltration Given by Other 8/11/23 2102)   etomidate (AMIDATE) 2 mg/mL injection 30 mg ( Intravenous MAR Unhold 8/12/23 0257)   Succinylcholine Chloride 100 mg/5 mL syringe 100 mg ( Intravenous MAR Unhold 8/12/23 0257)   Gadobutrol injection (SINGLE-DOSE) SOLN 15 mL (15 mL Intravenous Given 8/12/23 0432)   potassium chloride 20 mEq IVPB (premix) (20 mEq Intravenous New Bag 8/12/23 1111)   furosemide (LASIX) injection 20 mg (20 mg Intravenous Given 8/12/23 1314)       Diagnostic Studies  Results Reviewed     Procedure Component Value Units Date/Time    Lyme Total AB W Reflex to IGM/IGG [[id number]]  (Normal) Collected: 08/11/23 2020    Lab Status: Final result Specimen: Blood from Arm, Left Updated: 08/12/23 1353     Lyme Total Antibodies Negative    Cryptococcal antigen, CSF [[id number]]  (Normal) Collected: 08/11/23 2234    Lab Status: Final result Specimen: Cerebrospinal Fluid from Lumbar Puncture Updated: 08/12/23 1052     Crypto Ag, CSF Negative    CBC and differential [[id number]]  (Abnormal) Collected: 08/12/23 0534    Lab Status: Final result Specimen: Blood from Line, Venous Updated: 08/12/23 0914     WBC 5.97 Thousand/uL      RBC 3.52 Million/uL      Hemoglobin 11.7 g/dL      Hematocrit 34.2 %      MCV 97 fL      MCH 33.2 pg      MCHC 34.2 g/dL      RDW 14.6 %      MPV 9.6 fL      Platelets 702 Thousands/uL      nRBC 0 /100 WBCs      Neutrophils Relative 72 %      Immat GRANS % 1 %      Lymphocytes Relative 16 %      Monocytes Relative 9 %      Eosinophils Relative 1 %      Basophils Relative 1 %      Neutrophils Absolute 4.38 Thousands/µL      Immature Grans Absolute 0.03 Thousand/uL      Lymphocytes Absolute 0.97 Thousands/µL      Monocytes Absolute 0.53 Thousand/µL      Eosinophils Absolute 0.03 Thousand/µL      Basophils Absolute 0.03 Thousands/µL     TSH, 3rd generation [[id number]]  (Normal) Collected: 08/12/23 0534    Lab Status: Final result Specimen: Blood from Line, Venous Updated: 08/12/23 0730     TSH 3RD GENERATON 2.084 uIU/mL     Vitamin B12 [[id number]]  (Abnormal) Collected: 08/12/23 0534    Lab Status: Final result Specimen: Blood from Line, Venous Updated: 08/12/23 0730     Vitamin B-12 1,006 pg/mL     Folate [[id number]]  (Normal) Collected: 08/12/23 0534    Lab Status: Final result Specimen: Blood from Line, Venous Updated: 08/12/23 0730     Folate 10.5 ng/mL     Comprehensive metabolic panel [[id number]]  (Abnormal) Collected: 08/12/23 0534    Lab Status: Final result Specimen: Blood from Line, Venous Updated: 08/12/23 0727     Sodium 134 mmol/L      Potassium 3.2 mmol/L      Chloride 96 mmol/L      CO2 31 mmol/L      ANION GAP 7 mmol/L      BUN 6 mg/dL      Creatinine 0.67 mg/dL      Glucose 83 mg/dL      Calcium 8.6 mg/dL      Corrected Calcium 9.6 mg/dL      AST 64 U/L       U/L      Alkaline Phosphatase 102 U/L      Total Protein 5.9 g/dL      Albumin 2.7 g/dL      Total Bilirubin 0.74 mg/dL      eGFR 123 ml/min/1.73sq m     Narrative:      [place] guidelines for Chronic Kidney Disease (CKD):   •  Stage 1 with normal or high GFR (GFR > 90 mL/min/1.73 square meters)  •  Stage 2 Mild CKD (GFR = 60-89 mL/min/1.73 square meters)  •  Stage 3A Moderate CKD (GFR = 45-59 mL/min/1.73 square meters)  •  Stage 3B Moderate CKD (GFR = 30-44 mL/min/1.73 square meters)  •  Stage 4 Severe CKD (GFR = 15-29 mL/min/1.73 square meters)  •  Stage 5 End Stage CKD (GFR <15 mL/min/1.73 square meters)  Note: GFR calculation is accurate only with a steady state creatinine    Magnesium [[id number]]  (Normal) Collected: 08/12/23 0534    Lab Status: Final result Specimen: Blood from Line, Venous Updated: 08/12/23 0709     Magnesium 2.1 mg/dL     Phosphorus [[id number]]  (Normal) Collected: 08/12/23 0534    Lab Status: Final result Specimen: Blood from Line, Venous Updated: 08/12/23 0709     Phosphorus 3.6 mg/dL     Toxicology screen, urine [[id number]] Collected: 08/12/23 0316    Lab Status:  In process Specimen: Urine, Catheter Updated: 08/12/23 0402    UA (URINE) with reflex to Scope [[id number]]  (Abnormal) Collected: 08/12/23 0316    Lab Status: Final result Specimen: Urine, Indwelling Sheppard Catheter Updated: 08/12/23 0351     Color, UA Light Yellow     Clarity, UA Clear     Specific Gravity, UA 1.044     pH, UA 5.5     Leukocytes, UA Negative     Nitrite, UA Negative     Protein, UA Trace mg/dl      Glucose, UA Negative mg/dl      Ketones, UA Negative mg/dl      Urobilinogen, UA <2.0 mg/dl      Bilirubin, UA Negative     Occult Blood, UA Small    CSF white cell count with differential [[id number]]  (Abnormal) Collected: 08/11/23 2234    Lab Status: Final result Specimen: Cerebrospinal Fluid from Lumbar Puncture Updated: 08/12/23 0241     Appearance, CSF light pink     Tube Number, CSF 4     WBC, CSF 9 /uL      Xanthochromia No    CSF Diff [[id number]] Collected: 08/11/23 2234    Lab Status: Final result Specimen: Cerebrospinal Fluid from Lumbar Puncture Updated: 08/12/23 0239     Total Counted 100     Neutrophils % (CSF) 17 %      Lymphs % CSF 78 %      Monocytes % (CSF) 5 %     Gram stain CSF [[id number]] Collected: 08/11/23 2234    Lab Status: Final result Specimen: Cerebrospinal Fluid from Lumbar Puncture Updated: 08/12/23 0238     Gram Stain Result Rare Polys      Rare Mononuclear Cells      No bacteria seen    Meningitis/Encephalitis (ME) Panel [[id number]]  (Normal) Collected: 08/11/23 2234    Lab Status: Final result Specimen: Cerebrospinal Fluid from Lumbar Puncture Updated: 08/12/23 0237     C.NEOFORMANS/GATTII Not Detected     CYTOMEGALOVIRUS Not Detected     ENTEROVIRUS Not Detected     E.COLI K1 Not Detected     H.INFLUENZAE Not Detected     H.SIMPLEX 1 Not Detected     H.SIMPLEX 2 Not Detected     HERPES VIRUS 6 Not Detected     PARECHOVIRUS Not Detected     L. MONOCYTOGENES Not Detected     N.MENINGITIDIS Not Detected     S.AGALACTIAE Not Detected     S. PNEUMONIAE Not Detected     V.ZOSTER Not Detected    RBC count,CSF [[id number]]  (Abnormal) Collected: 08/11/23 2234    Lab Status: Final result Specimen: Cerebrospinal Fluid from Lumbar Puncture Updated: 08/12/23 0234     RBC, CSF 2,500 uL     Protein CSF [[id number]]  (Abnormal) Collected: 08/11/23 2234    Lab Status: Final result Specimen: Cerebrospinal Fluid from Lumbar Puncture Updated: 08/12/23 0022     Protein, CSF 46 mg/dL     Glucose CSF [[id number]]  (Normal) Collected: 08/11/23 2234    Lab Status: Final result Specimen: Cerebrospinal Fluid from Lumbar Puncture Updated: 08/12/23 0022     Glucose, CSF 59 mg/dL     LYME TITER IGG/IGM, CSF [[id number]] Collected: 08/11/23 2234    Lab Status: In process Specimen: Cerebrospinal Fluid from Lumbar Puncture Updated: 08/11/23 2342    Lyme disease, PCR [[id number]] Collected: 08/11/23 2240    Lab Status: In process Specimen: Cerebrospinal Fluid from Other Updated: 08/11/23 2342    Fungal culture [[id number]] Collected: 08/11/23 2240    Lab Status: In process Specimen: Cerebrospinal Fluid from Lumbar Puncture Updated: 08/11/23 2342    Spinal fluid culture and Gram stain [[id number]] Collected: 08/11/23 2234    Lab Status:  In process Specimen: Cerebrospinal Fluid from Lumbar Puncture Updated: 08/11/23 2341    UA w Reflex to Microscopic w Reflex to Culture [[id number]] Collected: 08/11/23 2020 Lab Status: Final result Specimen: Urine, Other Updated: 08/11/23 2035     Color, UA Colorless     Clarity, UA Clear     Specific Gravity, UA 1.017     pH, UA 6.5     Leukocytes, UA Negative     Nitrite, UA Negative     Protein, UA Negative mg/dl      Glucose, UA Negative mg/dl      Ketones, UA Negative mg/dl      Urobilinogen, UA <2.0 mg/dl      Bilirubin, UA Negative     Occult Blood, UA Negative    Rapid drug screen, urine [[id number]] Collected: 08/11/23 2020    Lab Status: No result Specimen: Urine, Catheter     TSH [[id number]]  (Normal) Collected: 08/11/23 1656    Lab Status: Final result Specimen: Blood from Arm, Left Updated: 08/11/23 2016     TSH 3RD GENERATON 2.421 uIU/mL     Procalcitonin [[id number]]  (Normal) Collected: 08/11/23 1656    Lab Status: Final result Specimen: Blood from Arm, Left Updated: 08/11/23 1742     Procalcitonin 0.11 ng/ml     Lactic acid, plasma (w/reflex if result > 2.0) [[id number]]  (Normal) Collected: 08/11/23 1656    Lab Status: Final result Specimen: Blood from Arm, Left Updated: 08/11/23 1737     LACTIC ACID 1.1 mmol/L     Narrative:      Result may be elevated if tourniquet was used during collection.     Comprehensive metabolic panel [[id number]]  (Abnormal) Collected: 08/11/23 1656    Lab Status: Final result Specimen: Blood from Arm, Left Updated: 08/11/23 1734     Sodium 129 mmol/L      Potassium 3.6 mmol/L      Chloride 90 mmol/L      CO2 32 mmol/L      ANION GAP 7 mmol/L      BUN 8 mg/dL      Creatinine 0.84 mg/dL      Glucose 88 mg/dL      Calcium 9.3 mg/dL      Corrected Calcium 9.8 mg/dL      AST 77 U/L       U/L      Alkaline Phosphatase 133 U/L      Total Protein 7.5 g/dL      Albumin 3.4 g/dL      Total Bilirubin 0.53 mg/dL      eGFR 112 ml/min/1.73sq m     Narrative:      [facility] guidelines for Chronic Kidney Disease (CKD):   •  Stage 1 with normal or high GFR (GFR > 90 mL/min/1.73 square meters)  •  Stage 2 Mild CKD (GFR = 60-89 mL/min/1.73 square meters)  •  Stage 3A Moderate CKD (GFR = 45-59 mL/min/1.73 square meters)  •  Stage 3B Moderate CKD (GFR = 30-44 mL/min/1.73 square meters)  •  Stage 4 Severe CKD (GFR = 15-29 mL/min/1.73 square meters)  •  Stage 5 End Stage CKD (GFR <15 mL/min/1.73 square meters)  Note: GFR calculation is accurate only with a steady state creatinine    Ammonia [[id number]]  (Normal) Collected: 08/11/23 1656    Lab Status: Final result Specimen: Blood from Arm, Left Updated: 08/11/23 1729     Ammonia 21 umol/L     Ethanol [[id number]]  (Normal) Collected: 08/11/23 1656    Lab Status: Final result Specimen: Blood from Arm, Left Updated: 08/11/23 1725     Ethanol Lvl <3 mg/dL     CBC and differential [[id number]] Collected: 08/11/23 1656    Lab Status: Final result Specimen: Blood from Arm, Left Updated: 08/11/23 1710     WBC 9.05 Thousand/uL      RBC 3.92 Million/uL      Hemoglobin 12.7 g/dL      Hematocrit 37.9 %      MCV 97 fL      MCH 32.4 pg      MCHC 33.5 g/dL      RDW 14.5 %      MPV 10.6 fL      Platelets 908 Thousands/uL      nRBC 0 /100 WBCs      Neutrophils Relative 72 %      Immat GRANS % 1 %      Lymphocytes Relative 17 %      Monocytes Relative 9 %      Eosinophils Relative 1 %      Basophils Relative 0 %      Neutrophils Absolute 6.50 Thousands/µL      Immature Grans Absolute 0.05 Thousand/uL      Lymphocytes Absolute 1.53 Thousands/µL      Monocytes Absolute 0.84 Thousand/µL      Eosinophils Absolute 0.09 Thousand/µL      Basophils Absolute 0.04 Thousands/µL     Blood gas, venous [[id number]]  (Abnormal) Collected: 08/11/23 1656    Lab Status: Final result Specimen: Blood from Arm, Left Updated: 08/11/23 1707     pH, Rasheed 7.354     pCO2, Rasheed 58.3 mm Hg      pO2, Rasheed 55.8 mm Hg      HCO3, Rasheed 31.8 mmol/L      Base Excess, Rasheed 4.6 mmol/L      O2 Content, Rasheed 15.5 ml/dL      O2 HGB, VENOUS 81.5 %     Fingerstick Glucose (POCT) [[id number]]  (Normal) Collected: 08/11/23 5275    Lab Status: Final result Updated: 08/11/23 1634     POC Glucose 89 mg/dl                  MRI brain w wo contrast   Final Result by Magnus Peoples MD (08/12 0972)      No acute intracranial abnormality. Stable nonspecific enhancement along the anterior floor of the third ventricle/hypothalamus when comparing to the March 15, 2023 study. Differential includes chronic toxic metabolic insult (thiamine deficiency), chronic inflammatory/ infectious    conditions. Other considerations include Langerhans cell cell histiocytosis, lymphocytic hypophysitis, and neurosyphilis. Primary CNS lymphoma is a less likely consideration, particularly given the lack of interval change. Serologic correlation is    recommended. Stable cerebral white matter signal abnormality, presumably the sequela of chronic microangiopathic disease given the patient's history. The study was marked in EPIC for significant notification. Workstation performed: [id number]         XR chest portable ICU   Final Result by Alice Jha MD (08/12 1020)      ET tube 7 cm above the prashant. Persistent bibasilar atelectasis. Workstation performed: [id number]         CT head without contrast   Final Result by Parker Lim MD (08/11 2026)      No acute intracranial abnormality. Stable small bilateral subcortical hypoattenuating foci. No associated mass effect. Workstation performed: [id number]         CT chest abdomen pelvis w contrast   Final Result by Parker Lim MD (08/11 2037)      Limited inspiratory volume. Interval improvement in bibasilar atelectatic changes. Hepatic steatosis. Stable borderline left para-aortic adenopathy.                   Workstation performed: [id number]               Procedures  Lumbar puncture    Date/Time: 8/12/2023 4:24 PM    Performed by: Lizabeth Araujo MD  Authorized by: Lizabeth Araujo MD  Universal Protocol:  Procedure performed by: (Verbal consent obtained via patient's mother for procedure)  Consent: Verbal consent obtained. Written consent not obtained. Risks and benefits: risks, benefits and alternatives were discussed  Consent given by: parent  Time out: Immediately prior to procedure a "time out" was called to verify the correct patient, procedure, equipment, support staff and site/side marked as required. Site marked: the operative site was marked  Patient identity confirmed: arm band      Patient location:  ED  Pre-procedure details:     Preparation: Patient was prepped and draped in usual sterile fashion    Indications:     Indications: evaluation for infection    Anesthesia (see MAR for exact dosages): Anesthesia method:  Local infiltration    Local anesthetic:  Lidocaine 1% w/o epi  Procedure details:     Lumbar space:  L4-L5 interspace    Patient position:  L lateral decubitus    Equipment: Lumbar puncture kit used      Needle gauge:  22G x 3.5in    Needle type:  Spinal needle - Quincke tip    Ultrasound guidance: no      Number of attempts:  2    Fluid appearance:  Blood-tinged then clearing    Tubes of fluid:  4    Total volume (ml):  5  Post-procedure:     Puncture site:  Direct pressure applied    Patient tolerance of procedure: Tolerated well, no immediate complications    Patient instructed to lie flat for one(1) hour post lumbar puncture.: Yes            ED Course                                       Medical Decision Making  61-year-old male with history of 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 Patient responsive only to noxious stimuli, GCS 5. Exam remarkable for stage II sacral decubitus ulcer. Rectal/core temp 96.1 °F.  No head trauma, no other signs of cutaneous source for infection. Lungs CTA with no wheezing.   Given several factors including patient's unresponsiveness, inability to communicate pain, generally unremarkable exterior exam, and history of metastatic cancer, the differential is broad for altered mental status. Diagnostic list includes but is not limited to, tox, polypharmacy, meningitis/encephalitis, hypoxia, hypercarbia, thyroid problems, cancer, paraneoplastic syndrome, PE, intra-abdominal abscess/infection, trauma (given unclear history of fall). Plan to perform CT of the head without contrast and CT of chest abdomen pelvis with contrast.  Labs to investigate a forementioned etiology of altered mental status have been ordered, and pending. Patient had an MRI approximately 11 days ago which did not show any specific concerns or produce any findings to explain his current state. He may need a spinal tap, however we will consider all imaging and labs prior to doing so. Labs not reveal any infection and CT scan of the head chest abdomen pelvis did not show any possible etiology to explain his current state. Spinal fluid was collected via lumbar puncture with no complication. Patient remained somnolent throughout the procedure and afterward with no change in status. Catatonia also added to the differential list.  Contacted the admitting team and the patient was admitted to the inpatient unit in stable condition on BiPAP     Amount and/or Complexity of Data Reviewed  Labs: ordered. Radiology: ordered. Risk  Prescription drug management. Decision regarding hospitalization.             Disposition  Final diagnoses:   Acute encephalopathy     Time reflects when diagnosis was documented in both MDM as applicable and the Disposition within this note     Time User Action Codes Description Comment    8/11/2023  9:30 PM Alannah Reeves Add [F06.1] Catatonic reaction     8/11/2023  9:32 PM Alannah Reeves Add [R40.0] Somnolence     8/11/2023 10:22 PM Theodoro Better Add [G93.40] Acute encephalopathy     8/11/2023 10:22 PM Theodoro Better Modify [F06.1] Catatonic reaction     8/11/2023 10:22 PM Theodoro Better Modify [G93.40] Acute encephalopathy     8/12/2023  3:42 PM Elsie Brochure Add [C62.90] Testicular cancer [facility])       ED Disposition     ED Disposition   Admit    Condition   Stable    Date/Time   Fri Aug 11, 2023 10:22 PM    Comment   Case was discussed with Dr. Angel Hughes and the patient's admission status was agreed to be Admission Status: inpatient status to the service of Dr. Angel Hughes . Follow-up Information    None         Current Discharge Medication List      CONTINUE these medications which have NOT CHANGED    Details   !! albuterol (ProAir HFA) 90 mcg/act inhaler Inhale 2 puffs every 6 (six) hours as needed for wheezing  Qty: 8.5 g, Refills: 0    Comments: Substitution to a formulary equivalent within the same pharmaceutical class is authorized. Associated Diagnoses: Testicular mass      !! albuterol (PROVENTIL HFA,VENTOLIN HFA) 90 mcg/act inhaler Inhale 2 puffs every 4 (four) hours as needed for wheezing      Enoxaparin Sodium (LOVENOX SC) Inject 40 mg under the skin every 12 (twelve) hours      FLUoxetine (PROzac) 10 mg capsule Take 10 mg by mouth daily      potassium chloride (K-DUR,KLOR-CON) 20 mEq tablet Take 2 tablets (40 mEq total) by mouth daily Do not start before August 6, 2023. Refills: 0    Associated Diagnoses: Acute on chronic heart failure with preserved ejection fraction (HCC)      QUEtiapine (SEROquel) 25 mg tablet Take 25 mg by mouth daily at bedtime      senna-docusate sodium (SENOKOT S) 8.6-50 mg per tablet Take 1 tablet by mouth daily at bedtime  Refills: 0    Associated Diagnoses: Constipation, unspecified constipation type      torsemide (DEMADEX) 20 mg tablet Take 1 tablet (20 mg total) by mouth daily  Qty: 90 tablet, Refills: 0    Associated Diagnoses: Acute systolic congestive heart failure ([address])       ! ! - Potential duplicate medications found. Please discuss with provider. No discharge procedures on file.     PDMP Review     None           ED Provider  Attending physically available and evaluated Hemanth Swanson. I managed the patient along with the ED Attending.     Electronically Signed by         Randy Wyatt MD  08/12/23 5237

## 2023-08-11 NOTE — ED ATTENDING ATTESTATION
8/11/2023   I, Trey Tran MD, saw and evaluated the patient. I have discussed the patient with the resident/non-physician practitioner and agree with the resident's/non-physician practitioner's findings, Plan of Care, and MDM as documented in the resident's/non-physician practitioner's note, except where noted. All available labs and Radiology studies were reviewed. I was present for key portions of any procedure(s) performed by the resident/non-physician practitioner and I was immediately available to provide assistance. At this point I agree with the current assessment done in the Emergency Department. I have conducted an independent evaluation of this patient a history and physical is as follows:    Unit/Bed#: ED 16 Encounter: [id number]    Chief Complaint   Patient presents with   • Altered Mental Status     Pt from rehab facility - presents with unresponsiveness. Staff reported to EMS that he has been unresponsive since 11am. Pt hasnt been wearing cpap machine, and after attempts to use it on him without improvement - called ems. 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 Sclera anicteric, conjunctiva are not injected. Moist oral mucosa  CARDIOVASCULAR/CHEST: RRR, no M/R/G. 2+ radial pulses  PULMONARY: Breathing comfortably on RA. Breath sounds are equal and clear to auscultation  ABDOMEN: non-distended. BS present, normoactive. Non-tender  MSK: moves all extremities, no deformities, no peripheral edema, no calf asymmetry  NEURO: Awake, alert, and oriented x 3. Face symmetric. Moves all extremities spontaneously.  No focal neurologic deficits  SKIN: Warm, appears well-perfused  MENTAL STATUS: Normal affect      Labs and Imaging  Labs Reviewed   POCT GLUCOSE - Normal       Result Value Ref Range Status    POC Glucose 89  65 - 140 mg/dl Final       No orders to display         Procedures  Procedures        ED Course  Medications - No data to display potential for falsely depressed results. Specimen collection should occur prior to Sulfapyridine administration due to the potential for falsely elevated results. Calcium 9.3  8.3 - 10.1 mg/dL Final    Corrected Calcium 9.8  8.3 - 10.1 mg/dL Final    AST 77 (*) 5 - 45 U/L Final    Comment: Slightly Hemolyzed; Results May be Affected  Specimen collection should occur prior to Sulfasalazine administration due to the potential for falsely depressed results.  (*) 12 - 78 U/L Final    Comment: Specimen collection should occur prior to Sulfasalazine and/or Sulfapyridine administration due to the potential for falsely depressed results. Alkaline Phosphatase 133 (*) 46 - 116 U/L Final    Total Protein 7.5  6.4 - 8.4 g/dL Final    Albumin 3.4 (*) 3.5 - 5.0 g/dL Final    Total Bilirubin 0.53  0.20 - 1.00 mg/dL Final    Comment: Use of this assay is not recommended for patients undergoing treatment with eltrombopag due to the potential for falsely elevated results.     eGFR 112  ml/min/1.73sq m Final    Narrative:     National Kid[employer] Foundation guidelines for Chronic Kidney Disease (CKD):   •  Stage 1 with normal or high GFR (GFR > 90 mL/min/1.73 square meters)  •  Stage 2 Mild CKD (GFR = 60-89 mL/min/1.73 square meters)  •  Stage 3A Moderate CKD (GFR = 45-59 mL/min/1.73 square meters)  •  Stage 3B Moderate CKD (GFR = 30-44 mL/min/1.73 square meters)  •  Stage 4 Severe CKD (GFR = 15-29 mL/min/1.73 square meters)  •  Stage 5 End Stage CKD (GFR <15 mL/min/1.73 square meters)  Note: GFR calculation is accurate only with a steady state creatinine   BLOOD GAS, VENOUS - Abnormal    pH, Rasheed 7.354  7.300 - 7.400 Final    pCO2, Rasheed 58.3 (*) 42.0 - 50.0 mm Hg Final    pO2, Rasheed 55.8 (*) 35.0 - 45.0 mm Hg Final    HCO3, Rasheed 31.8 (*) 24 - 30 mmol/L Final    Base Excess, Rasheed 4.6  mmol/L Final    O2 Content, Rasheed 15.5  ml/dL Final    O2 HGB, VENOUS 81.5 (*) 60.0 - 80.0 % Final   LACTIC ACID, PLASMA (W/REFLEX IF RESULT > 2.0) - Normal    LACTIC ACID 1.1  0.5 - 2.0 mmol/L Final    Narrative:     Result may be elevated if tourniquet was used during collection. TSH, 3RD GENERATION - Normal    TSH 3RD GENERATON 2.421  0.450 - 4.500 uIU/mL Final    Comment: Adult TSH (3rd generation) reference range follows the recommended guidelines of the American Thy[employer] Association, January, 2020. AMMONIA - Normal    Ammonia 21  11 - 35 umol/L Final    Comment: Specimen collection should occur prior to Sulfasalazine administration due to the potential for falsely elevated results. Specimen collection should occur prior to Sulfapyridine administration due to the potential for falsely depressed results. MEDICAL ALCOHOL - Normal    Ethanol Lvl <3  0 - 3 mg/dL Final   PROCALCITONIN TEST - Normal    Procalcitonin 0.11  <=0.25 ng/ml Final    Comment: Suspected Lower Respiratory Tract Infection (LRTI):  - LESS than or EQUAL to 0.25 ng/mL:   low likelihood for bacterial LRTI; antibiotics DISCOURAGED.  - GREATER than 0.25 ng/mL:   increased likelihood for bacterial LRTI; antibiotics ENCOURAGED. Suspected Sepsis:  - Strongly consider initiating antibiotics in ALL UNSTABLE patients. - LESS than or EQUAL to 0.5 ng/mL:   low likelihood for bacterial sepsis; antibiotics DISCOURAGED.  - GREATER than 0.5 ng/mL:   increased likelihood for bacterial sepsis; antibiotics ENCOURAGED.  - GREATER than 2 ng/mL:   high risk for severe sepsis / septic shock; antibiotics strongly ENCOURAGED. Decisions on antibiotic use should not be based solely on Procalcitonin (PCT) levels. If PCT is low but uncertainty exists with stopping antibiotics, repeat PCT in 6-24 hours to confirm the low level.  If antibiotics are administered (regardless if initial PCT was high or low), repeat PCT every 1-2 days to consider early antibiotic cessation (when GREATER than 80% decrease from the peak OR when PCT drops below designated cutoffs, whichever comes first), so long as the infection is NOT one that typically requires prolonged treatment durations (e.g., bone/joint infections, endocarditis, Staph. aureus bacteremia).     Situations of FALSE-POSITIVE Procalcitonin values:  1) Newborns < 67 hours old  2) Massive stress from severe trauma / burns, major surgery, acute pancreatitis, cardiogenic / hemorrhagic shock, sickle cell crisis, or other organ perfusion abnormalities  3) Malaria and some Candidal infections  4) Treatment with agents that stimulate cytokines (e.g., OKT3, anti-lymphocyte globulins, alemtuzumab, IL-2, granulocyte transfusion [NOT GCSFs])  5) Chronic renal disease causes elevated baseline levels (consider GREATER than 0.75 ng/mL as an abnormal cut-off); initiating HD/CRRT may cause transient decreases  6) Paraneoplastic syndromes from medullary thyroid or SCLC, some forms of vasculitis, and acute hxruh-nd-qgij disease    Situations of FALSE-NEGATIVE Procalcitonin values:  1) Too early in clinical course for PCT to have reached its peak (may repeat in 6-24 hours to confirm low level)  2) Localized infection WITHOUT systemic (SIRS / sepsis) response (e.g., an abscess, osteomyelitis, cystitis)  3) Mycobacteria (e.g., Tuberculosis, MAC)  4) Cystic fibrosis exacerbations     POCT GLUCOSE - Normal    POC Glucose 89  65 - 140 mg/dl Final   CBC AND DIFFERENTIAL    WBC 9.05  4.31 - 10.16 Thousand/uL Final    RBC 3.92  3.88 - 5.62 Million/uL Final    Hemoglobin 12.7  12.0 - 17.0 g/dL Final    Hematocrit 37.9  36.5 - 49.3 % Final    MCV 97  82 - 98 fL Final    MCH 32.4  26.8 - 34.3 pg Final    MCHC 33.5  31.4 - 37.4 g/dL Final    RDW 14.5  11.6 - 15.1 % Final    MPV 10.6  8.9 - 12.7 fL Final    Platelets 307  676 - 390 Thousands/uL Final    nRBC 0  /100 WBCs Final    Neutrophils Relative 72  43 - 75 % Final    Immat GRANS % 1  0 - 2 % Final    Lymphocytes Relative 17  14 - 44 % Final    Monocytes Relative 9  4 - 12 % Final    Eosinophils Relative 1  0 - 6 % Final    Basophils Relative 0  0 - 1 % Final    Neutrophils Absolute 6.50  1.85 - 7.62 Thousands/µL Final    Immature Grans Absolute 0.05  0.00 - 0.20 Thousand/uL Final    Lymphocytes Absolute 1.53  0.60 - 4.47 Thousands/µL Final    Monocytes Absolute 0.84  0.17 - 1.22 Thousand/µL Final    Eosinophils Absolute 0.09  0.00 - 0.61 Thousand/µL Final    Basophils Absolute 0.04  0.00 - 0.10 Thousands/µL Final   UA W REFLEX TO MICROSCOPIC WITH REFLEX TO CULTURE    Color, UA Colorless   Final    Clarity, UA Clear   Final    Specific Gravity, UA 1.017  1.003 - 1.030 Final    pH, UA 6.5  4.5, 5.0, 5.5, 6.0, 6.5, 7.0, 7.5, 8.0 Final    Leukocytes, UA Negative  Negative Final    Nitrite, UA Negative  Negative Final    Protein, UA Negative  Negative mg/dl Final    Glucose, UA Negative  Negative mg/dl Final    Ketones, UA Negative  Negative mg/dl Final    Urobilinogen, UA <2.0  <2.0 mg/dl mg/dl Final    Bilirubin, UA Negative  Negative Final    Occult Blood, UA Negative  Negative Final       CT head without contrast   Final Result      No acute intracranial abnormality. Stable small bilateral subcortical hypoattenuating foci. No associated mass effect. Workstation performed: [id number]         CT chest abdomen pelvis w contrast   Final Result      Limited inspiratory volume. Interval improvement in bibasilar atelectatic changes. Hepatic steatosis. Stable borderline left para-aortic adenopathy. Workstation performed: [id number]               Procedures  ECG 12 Lead Documentation Only    Date/Time: 8/11/2023 4:49 PM    Performed by: Madeleine Cassidy MD  Authorized by: Madeleine Cassidy MD    Comments:      Normal sinus rhythm, ventricular rate 66, OR is able to 20 consistent with first-degree block, QRS 92, QTc 446, normal axis, no ST/T wave changes/ischemia, no STEMI. No significant change from prior EKG dated 5/4/2023, except first-degree AV block is now present.   CriticalCare Time    Date/Time: 8/11/2023 9:00 PM    Performed by: Timur Akbar MD  Authorized by: Timur Akbar MD    Critical care provider statement:     Critical care time (minutes):  42    Critical care time was exclusive of:  Teaching time and separately billable procedures and treating other patients    Critical care was necessary to treat or prevent imminent or life-threatening deterioration of the following conditions:  CNS failure or compromise    Critical care was time spent personally by me on the following activities:  Obtaining history from patient or surrogate, examination of patient, review of old charts, ordering and review of laboratory studies, ordering and review of radiographic studies, evaluation of patient's response to treatment, development of treatment plan with patient or surrogate and discussions with consultants            ED Course  Medications   iohexol (OMNIPAQUE) 350 MG/ML injection (SINGLE-DOSE) 100 mL (100 mL Intravenous Given 8/11/23 1952)   lidocaine (PF) (XYLOCAINE-MPF) 1 % injection 10 mL (10 mL Infiltration Given by Other 8/11/23 2102)       41 y.o. male presenting with lethargy/altered mental status. Patient recently admitted with CHF exacerbation and encephalopathy, underwent comprehensive evaluation including labs and brain MRI, was discharged to [facility]. Medical record including labs and test results reviewed. At present, patient is minimally responsive but is protecting own airway. He resists attempts at eye exam and deviates his arm from his face to the side of his body when the arm is let go above his face, so he is sufficiently aware to avoid pain/discomfort, however, he is not doing much else and is almost catatonic.   Differential for this is quite broad, including encephalitis, bipolar disorder catatonic state, hypoxia/hypercarbia (in setting of pickwickian physiology or CROW), metabolic derangements such as CHRIST, uremia, hyperammonemia, sodium derangement, endocrine abnormality such as myxedema coma, polypharmacy, illicit substances (less likely, patient is admitted at [facility]), paraneoplastic syndrome, occult trauma, infection such as UTI, GI source of infection, lyme disease, syphilis, etc.  Comprehensive evaluation including labs, CT head, CT chest/abdomen/pelvis and LP pursued. Patient will be admitted to Internal Medicine for further work up. LP performed by resident physician under my direct supervision, please see his note for procedure details. Patient is deemed critically ill due to encephalopathy/CNS dysfunction.

## 2023-08-11 NOTE — ED ATTENDING ATTESTATION
8/11/2023   IDelmy MD, saw and evaluated the patient. I have discussed the patient with the resident/non-physician practitioner and agree with the resident's/non-physician practitioner's findings, Plan of Care, and MDM as documented in the resident's/non-physician practitioner's note, except where noted. All available labs and Radiology studies were reviewed. I was present for key portions of any procedure(s) performed by the resident/non-physician practitioner and I was immediately available to provide assistance. At this point I agree with the current assessment done in the Emergency Department. I have conducted an independent evaluation of this patient a history and physical is as follows:    Unit/Bed#: ED 16 Encounter: [id number]    Chief Complaint   Patient presents with   • Altered Mental Status     Pt from rehab facility - presents with unresponsiveness. Staff reported to EMS that he has been unresponsive since 11am. Pt hasnt been wearing cpap machine, and after attempts to use it on him without improvement - called ems. 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 Sclera anicteric, conjunctiva are not injected. Moist oral mucosa  CARDIOVASCULAR/CHEST: RRR, no M/R/G. 2+ radial pulses  PULMONARY: Breathing comfortably on RA. Breath sounds are equal and clear to auscultation  ABDOMEN: non-distended. BS present, normoactive. Non-tender  MSK: moves all extremities, no deformities, no peripheral edema, no calf asymmetry  NEURO: Awake, alert, and oriented x 3. Face symmetric. Moves all extremities spontaneously.  No focal neurologic deficits  SKIN: Warm, appears well-perfused  MENTAL STATUS: Normal affect      Labs and Imaging  Labs Reviewed   POCT GLUCOSE - Normal       Result Value Ref Range Status    POC Glucose 89  65 - 140 mg/dl Final       No orders to display         Procedures  Procedures        ED Course  Medications - No data to display

## 2023-08-12 ENCOUNTER — APPOINTMENT (INPATIENT)
Dept: RADIOLOGY | Facility: HOSPITAL | Age: 38
DRG: 421 | End: 2023-08-12
Payer: COMMERCIAL

## 2023-08-12 ENCOUNTER — APPOINTMENT (INPATIENT)
Dept: NEUROLOGY | Facility: CLINIC | Age: 38
DRG: 421 | End: 2023-08-12
Payer: COMMERCIAL

## 2023-08-12 ENCOUNTER — APPOINTMENT (OUTPATIENT)
Dept: RADIOLOGY | Facility: HOSPITAL | Age: 38
DRG: 421 | End: 2023-08-12
Payer: COMMERCIAL

## 2023-08-12 PROBLEM — F31.9 BIPOLAR I DISORDER (HCC): Chronic | Status: ACTIVE | Noted: 2023-07-28

## 2023-08-12 PROBLEM — I50.32 CHRONIC DIASTOLIC HEART FAILURE (HCC): Chronic | Status: ACTIVE | Noted: 2023-07-19

## 2023-08-12 PROBLEM — G93.40 ENCEPHALOPATHY: Status: ACTIVE | Noted: 2023-08-12

## 2023-08-12 PROBLEM — J96.00 ACUTE RESPIRATORY FAILURE (HCC): Status: ACTIVE | Noted: 2023-08-12

## 2023-08-12 PROBLEM — I10 HYPERTENSION: Chronic | Status: ACTIVE | Noted: 2022-12-10

## 2023-08-12 LAB
ALBUMIN SERPL BCP-MCNC: 2.7 G/DL (ref 3.5–5)
ALP SERPL-CCNC: 102 U/L (ref 46–116)
ALT SERPL W P-5'-P-CCNC: 142 U/L (ref 12–78)
ANION GAP SERPL CALCULATED.3IONS-SCNC: 7 MMOL/L
APPEARANCE CSF: ABNORMAL
ARTERIAL PATENCY WRIST A: YES
AST SERPL W P-5'-P-CCNC: 64 U/L (ref 5–45)
ATRIAL RATE: 66 BPM
B BURGDOR IGG+IGM SER QL IA: NEGATIVE
BACTERIA UR QL AUTO: ABNORMAL /HPF
BASE EXCESS BLDA CALC-SCNC: 8.2 MMOL/L
BASOPHILS # BLD AUTO: 0.03 THOUSANDS/ÂΜL (ref 0–0.1)
BASOPHILS NFR BLD AUTO: 1 % (ref 0–1)
BILIRUB SERPL-MCNC: 0.74 MG/DL (ref 0.2–1)
BILIRUB UR QL STRIP: NEGATIVE
BUN SERPL-MCNC: 6 MG/DL (ref 5–25)
C GATTII+NEOFOR DNA CSF QL NAA+NON-PROBE: NOT DETECTED
CA-I BLD-SCNC: 1.13 MMOL/L (ref 1.12–1.32)
CALCIUM ALBUM COR SERPL-MCNC: 9.6 MG/DL (ref 8.3–10.1)
CALCIUM SERPL-MCNC: 8.6 MG/DL (ref 8.3–10.1)
CHLORIDE SERPL-SCNC: 96 MMOL/L (ref 96–108)
CLARITY UR: CLEAR
CMV DNA CSF QL NAA+NON-PROBE: NOT DETECTED
CO2 SERPL-SCNC: 31 MMOL/L (ref 21–32)
COLOR UR: ABNORMAL
CREAT SERPL-MCNC: 0.67 MG/DL (ref 0.6–1.3)
CRYPTOC AG CSF QL IA: NEGATIVE
E COLI K1 DNA CSF QL NAA+NON-PROBE: NOT DETECTED
EOSINOPHIL # BLD AUTO: 0.03 THOUSAND/ÂΜL (ref 0–0.61)
EOSINOPHIL NFR BLD AUTO: 1 % (ref 0–6)
ERYTHROCYTE [DISTWIDTH] IN BLOOD BY AUTOMATED COUNT: 14.6 % (ref 11.6–15.1)
EV RNA CSF QL NAA+NON-PROBE: NOT DETECTED
FOLATE SERPL-MCNC: 10.5 NG/ML
GFR SERPL CREATININE-BSD FRML MDRD: 123 ML/MIN/1.73SQ M
GLUCOSE CSF-MCNC: 59 MG/DL (ref 50–80)
GLUCOSE SERPL-MCNC: 83 MG/DL (ref 65–140)
GLUCOSE UR STRIP-MCNC: NEGATIVE MG/DL
GP B STREP DNA CSF QL NAA+NON-PROBE: NOT DETECTED
GRAM STN SPEC: NORMAL
HAEM INFLU DNA CSF QL NAA+NON-PROBE: NOT DETECTED
HCO3 BLDA-SCNC: 32.9 MMOL/L (ref 22–28)
HCT VFR BLD AUTO: 34.2 % (ref 36.5–49.3)
HGB BLD-MCNC: 11.7 G/DL (ref 12–17)
HGB UR QL STRIP.AUTO: ABNORMAL
HHV6 DNA CSF QL NAA+NON-PROBE: NOT DETECTED
HOROWITZ INDEX BLDA+IHG-RTO: 60 MM[HG]
HSV1 DNA CSF QL NAA+NON-PROBE: NOT DETECTED
HSV2 DNA CSF QL NAA+NON-PROBE: NOT DETECTED
IMM GRANULOCYTES # BLD AUTO: 0.03 THOUSAND/UL (ref 0–0.2)
IMM GRANULOCYTES NFR BLD AUTO: 1 % (ref 0–2)
KETONES UR STRIP-MCNC: NEGATIVE MG/DL
L MONOCYTOG DNA CSF QL NAA+NON-PROBE: NOT DETECTED
LEUKOCYTE ESTERASE UR QL STRIP: NEGATIVE
LYMPHOCYTES # BLD AUTO: 0.97 THOUSANDS/ÂΜL (ref 0.6–4.47)
LYMPHOCYTES NFR BLD AUTO: 16 % (ref 14–44)
LYMPHOCYTES NFR CSF MANUAL: 78 %
MAGNESIUM SERPL-MCNC: 2.1 MG/DL (ref 1.6–2.6)
MCH RBC QN AUTO: 33.2 PG (ref 26.8–34.3)
MCHC RBC AUTO-ENTMCNC: 34.2 G/DL (ref 31.4–37.4)
MCV RBC AUTO: 97 FL (ref 82–98)
MONOCYTES # BLD AUTO: 0.53 THOUSAND/ÂΜL (ref 0.17–1.22)
MONOCYTES NFR BLD AUTO: 9 % (ref 4–12)
MONOS+MACROS CSF MANUAL: 5 %
N MEN DNA CSF QL NAA+NON-PROBE: NOT DETECTED
NEUTROPHILS # BLD AUTO: 4.38 THOUSANDS/ÂΜL (ref 1.85–7.62)
NEUTROPHILS NFR CSF MANUAL: 17 %
NEUTS SEG NFR BLD AUTO: 72 % (ref 43–75)
NITRITE UR QL STRIP: NEGATIVE
NON-SQ EPI CELLS URNS QL MICRO: ABNORMAL /HPF
NRBC BLD AUTO-RTO: 0 /100 WBCS
O2 CT BLDA-SCNC: 16.7 ML/DL (ref 16–23)
OXYHGB MFR BLDA: 96.8 % (ref 94–97)
P AXIS: 60 DEGREES
PARECHOVIRUS A RNA CSF QL NAA+NON-PROBE: NOT DETECTED
PCO2 BLDA: 45.9 MM HG (ref 36–44)
PEEP RESPIRATORY: 6 CM[H2O]
PH BLDA: 7.47 [PH] (ref 7.35–7.45)
PH UR STRIP.AUTO: 5.5 [PH]
PHOSPHATE SERPL-MCNC: 3.6 MG/DL (ref 2.7–4.5)
PLATELET # BLD AUTO: 253 THOUSANDS/UL (ref 149–390)
PMV BLD AUTO: 9.6 FL (ref 8.9–12.7)
PO2 BLDA: 100.2 MM HG (ref 75–129)
POTASSIUM SERPL-SCNC: 3.2 MMOL/L (ref 3.5–5.3)
PR INTERVAL: 220 MS
PROT CSF-MCNC: 46 MG/DL (ref 15–45)
PROT SERPL-MCNC: 5.9 G/DL (ref 6.4–8.4)
PROT UR STRIP-MCNC: ABNORMAL MG/DL
QRS AXIS: 68 DEGREES
QRSD INTERVAL: 92 MS
QT INTERVAL: 426 MS
QTC INTERVAL: 446 MS
RBC # BLD AUTO: 3.52 MILLION/UL (ref 3.88–5.62)
RBC # CSF MANUAL: 2500 UL (ref 0–10)
RBC #/AREA URNS AUTO: ABNORMAL /HPF
S PNEUM DNA CSF QL NAA+NON-PROBE: NOT DETECTED
SODIUM SERPL-SCNC: 134 MMOL/L (ref 135–147)
SP GR UR STRIP.AUTO: 1.04 (ref 1–1.03)
SPECIMEN SOURCE: ABNORMAL
T WAVE AXIS: 67 DEGREES
TOTAL CELLS COUNTED BLD: NO
TOTAL CELLS COUNTED SPEC: 100
TSH SERPL DL<=0.05 MIU/L-ACNC: 2.08 UIU/ML (ref 0.45–4.5)
TUBE # CSF: 4
UROBILINOGEN UR STRIP-ACNC: <2 MG/DL
VENT AC: 17
VENT- AC: AC
VENTRICULAR RATE: 66 BPM
VIT B12 SERPL-MCNC: 1006 PG/ML (ref 180–914)
VT SETTING VENT: 450 ML
VZV DNA CSF QL NAA+NON-PROBE: NOT DETECTED
WBC # BLD AUTO: 5.97 THOUSAND/UL (ref 4.31–10.16)
WBC # CSF AUTO: 9 /UL (ref 0–5)
WBC #/AREA URNS AUTO: ABNORMAL /HPF

## 2023-08-12 PROCEDURE — 82607 VITAMIN B-12: CPT | Performed by: PHYSICIAN ASSISTANT

## 2023-08-12 PROCEDURE — 81001 URINALYSIS AUTO W/SCOPE: CPT | Performed by: PHYSICIAN ASSISTANT

## 2023-08-12 PROCEDURE — 84443 ASSAY THYROID STIM HORMONE: CPT | Performed by: PHYSICIAN ASSISTANT

## 2023-08-12 PROCEDURE — 84100 ASSAY OF PHOSPHORUS: CPT | Performed by: PHYSICIAN ASSISTANT

## 2023-08-12 PROCEDURE — 31500 INSERT EMERGENCY AIRWAY: CPT | Performed by: PHYSICIAN ASSISTANT

## 2023-08-12 PROCEDURE — NC001 PR NO CHARGE: Performed by: NURSE PRACTITIONER

## 2023-08-12 PROCEDURE — 94664 DEMO&/EVAL PT USE INHALER: CPT

## 2023-08-12 PROCEDURE — 99254 IP/OBS CNSLTJ NEW/EST MOD 60: CPT | Performed by: PSYCHIATRY & NEUROLOGY

## 2023-08-12 PROCEDURE — 5A1935Z RESPIRATORY VENTILATION, LESS THAN 24 CONSECUTIVE HOURS: ICD-10-PCS | Performed by: EMERGENCY MEDICINE

## 2023-08-12 PROCEDURE — 009U3ZX DRAINAGE OF SPINAL CANAL, PERCUTANEOUS APPROACH, DIAGNOSTIC: ICD-10-PCS | Performed by: EMERGENCY MEDICINE

## 2023-08-12 PROCEDURE — 82805 BLOOD GASES W/O2 SATURATION: CPT | Performed by: PHYSICIAN ASSISTANT

## 2023-08-12 PROCEDURE — 94760 N-INVAS EAR/PLS OXIMETRY 1: CPT

## 2023-08-12 PROCEDURE — 95715 VEEG EA 12-26HR INTMT MNTR: CPT

## 2023-08-12 PROCEDURE — 82330 ASSAY OF CALCIUM: CPT | Performed by: PHYSICIAN ASSISTANT

## 2023-08-12 PROCEDURE — 70553 MRI BRAIN STEM W/O & W/DYE: CPT

## 2023-08-12 PROCEDURE — NC001 PR NO CHARGE: Performed by: PHYSICIAN ASSISTANT

## 2023-08-12 PROCEDURE — 80307 DRUG TEST PRSMV CHEM ANLYZR: CPT | Performed by: PHYSICIAN ASSISTANT

## 2023-08-12 PROCEDURE — 36600 WITHDRAWAL OF ARTERIAL BLOOD: CPT

## 2023-08-12 PROCEDURE — 86341 ISLET CELL ANTIBODY: CPT

## 2023-08-12 PROCEDURE — 0BH17EZ INSERTION OF ENDOTRACHEAL AIRWAY INTO TRACHEA, VIA NATURAL OR ARTIFICIAL OPENING: ICD-10-PCS | Performed by: EMERGENCY MEDICINE

## 2023-08-12 PROCEDURE — 85025 COMPLETE CBC W/AUTO DIFF WBC: CPT | Performed by: PHYSICIAN ASSISTANT

## 2023-08-12 PROCEDURE — 86255 FLUORESCENT ANTIBODY SCREEN: CPT | Performed by: PSYCHIATRY & NEUROLOGY

## 2023-08-12 PROCEDURE — 93010 ELECTROCARDIOGRAM REPORT: CPT | Performed by: INTERNAL MEDICINE

## 2023-08-12 PROCEDURE — 94003 VENT MGMT INPAT SUBQ DAY: CPT

## 2023-08-12 PROCEDURE — 99291 CRITICAL CARE FIRST HOUR: CPT | Performed by: PHYSICIAN ASSISTANT

## 2023-08-12 PROCEDURE — 82746 ASSAY OF FOLIC ACID SERUM: CPT | Performed by: PHYSICIAN ASSISTANT

## 2023-08-12 PROCEDURE — 94002 VENT MGMT INPAT INIT DAY: CPT

## 2023-08-12 PROCEDURE — 95700 EEG CONT REC W/VID EEG TECH: CPT

## 2023-08-12 PROCEDURE — 80053 COMPREHEN METABOLIC PANEL: CPT | Performed by: PHYSICIAN ASSISTANT

## 2023-08-12 PROCEDURE — 84425 ASSAY OF VITAMIN B-1: CPT | Performed by: PSYCHIATRY & NEUROLOGY

## 2023-08-12 PROCEDURE — 83735 ASSAY OF MAGNESIUM: CPT | Performed by: PHYSICIAN ASSISTANT

## 2023-08-12 PROCEDURE — A9585 GADOBUTROL INJECTION: HCPCS | Performed by: INTERNAL MEDICINE

## 2023-08-12 PROCEDURE — 4A10X4Z MONITORING OF CENTRAL NERVOUS ELECTRICAL ACTIVITY, EXTERNAL APPROACH: ICD-10-PCS | Performed by: PSYCHIATRY & NEUROLOGY

## 2023-08-12 PROCEDURE — 71045 X-RAY EXAM CHEST 1 VIEW: CPT

## 2023-08-12 RX ORDER — FENTANYL CITRATE 50 UG/ML
INJECTION, SOLUTION INTRAMUSCULAR; INTRAVENOUS
Status: COMPLETED
Start: 2023-08-12 | End: 2023-08-12

## 2023-08-12 RX ORDER — FAMOTIDINE 10 MG/ML
20 INJECTION, SOLUTION INTRAVENOUS 2 TIMES DAILY
Status: DISCONTINUED | OUTPATIENT
Start: 2023-08-12 | End: 2023-08-16

## 2023-08-12 RX ORDER — SUCCINYLCHOLINE/SOD CL,ISO/PF 100 MG/5ML
100 SYRINGE (ML) INTRAVENOUS ONCE
Status: COMPLETED | OUTPATIENT
Start: 2023-08-12 | End: 2023-08-12

## 2023-08-12 RX ORDER — QUETIAPINE FUMARATE 25 MG/1
25 TABLET, FILM COATED ORAL
Status: DISCONTINUED | OUTPATIENT
Start: 2023-08-12 | End: 2023-08-12

## 2023-08-12 RX ORDER — GADOBUTROL 604.72 MG/ML
15 INJECTION INTRAVENOUS
Status: COMPLETED | OUTPATIENT
Start: 2023-08-12 | End: 2023-08-12

## 2023-08-12 RX ORDER — CHLORHEXIDINE GLUCONATE 0.12 MG/ML
15 RINSE ORAL EVERY 12 HOURS SCHEDULED
Status: DISCONTINUED | OUTPATIENT
Start: 2023-08-12 | End: 2023-08-12

## 2023-08-12 RX ORDER — SENNOSIDES 8.6 MG
1 TABLET ORAL
Status: DISCONTINUED | OUTPATIENT
Start: 2023-08-12 | End: 2023-08-14

## 2023-08-12 RX ORDER — FUROSEMIDE 10 MG/ML
20 INJECTION INTRAMUSCULAR; INTRAVENOUS ONCE
Status: COMPLETED | OUTPATIENT
Start: 2023-08-12 | End: 2023-08-12

## 2023-08-12 RX ORDER — ETOMIDATE 2 MG/ML
30 INJECTION INTRAVENOUS ONCE
Status: COMPLETED | OUTPATIENT
Start: 2023-08-12 | End: 2023-08-12

## 2023-08-12 RX ORDER — PROPOFOL 10 MG/ML
5-50 INJECTION, EMULSION INTRAVENOUS
Status: DISCONTINUED | OUTPATIENT
Start: 2023-08-12 | End: 2023-08-12

## 2023-08-12 RX ORDER — FENTANYL CITRATE 50 UG/ML
50 INJECTION, SOLUTION INTRAMUSCULAR; INTRAVENOUS
Status: DISCONTINUED | OUTPATIENT
Start: 2023-08-12 | End: 2023-08-14

## 2023-08-12 RX ORDER — CHLORHEXIDINE GLUCONATE 0.12 MG/ML
15 RINSE ORAL EVERY 12 HOURS SCHEDULED
Status: DISCONTINUED | OUTPATIENT
Start: 2023-08-12 | End: 2023-08-14

## 2023-08-12 RX ORDER — FLUOXETINE 10 MG/1
10 CAPSULE ORAL DAILY
Status: DISCONTINUED | OUTPATIENT
Start: 2023-08-12 | End: 2023-08-18 | Stop reason: HOSPADM

## 2023-08-12 RX ORDER — GADOBUTROL 604.72 MG/ML
339 INJECTION INTRAVENOUS
Status: DISCONTINUED | OUTPATIENT
Start: 2023-08-12 | End: 2023-08-12

## 2023-08-12 RX ORDER — POTASSIUM CHLORIDE 14.9 MG/ML
20 INJECTION INTRAVENOUS
Status: COMPLETED | OUTPATIENT
Start: 2023-08-12 | End: 2023-08-12

## 2023-08-12 RX ORDER — HEPARIN SODIUM 5000 [USP'U]/ML
5000 INJECTION, SOLUTION INTRAVENOUS; SUBCUTANEOUS EVERY 8 HOURS SCHEDULED
Status: DISCONTINUED | OUTPATIENT
Start: 2023-08-12 | End: 2023-08-12

## 2023-08-12 RX ADMIN — SENNOSIDES 8.6 MG: 8.6 TABLET, FILM COATED ORAL at 21:15

## 2023-08-12 RX ADMIN — CHLORHEXIDINE GLUCONATE 15 ML: 1.2 SOLUTION ORAL at 08:24

## 2023-08-12 RX ADMIN — POTASSIUM CHLORIDE 20 MEQ: 14.9 INJECTION, SOLUTION INTRAVENOUS at 11:11

## 2023-08-12 RX ADMIN — ETOMIDATE 30 MG: 2 INJECTION, SOLUTION INTRAVENOUS at 02:45

## 2023-08-12 RX ADMIN — Medication 100 MG: at 02:45

## 2023-08-12 RX ADMIN — SODIUM CHLORIDE, SODIUM GLUCONATE, SODIUM ACETATE, POTASSIUM CHLORIDE AND MAGNESIUM CHLORIDE 125 ML/HR: 526; 502; 368; 37; 30 INJECTION, SOLUTION INTRAVENOUS at 06:08

## 2023-08-12 RX ADMIN — FENTANYL CITRATE 50 MCG: 50 INJECTION INTRAMUSCULAR; INTRAVENOUS at 03:27

## 2023-08-12 RX ADMIN — FAMOTIDINE 20 MG: 10 INJECTION INTRAVENOUS at 08:25

## 2023-08-12 RX ADMIN — ENOXAPARIN SODIUM 40 MG: 40 INJECTION SUBCUTANEOUS at 21:15

## 2023-08-12 RX ADMIN — CHLORHEXIDINE GLUCONATE 15 ML: 1.2 SOLUTION ORAL at 21:15

## 2023-08-12 RX ADMIN — ENOXAPARIN SODIUM 40 MG: 40 INJECTION SUBCUTANEOUS at 08:24

## 2023-08-12 RX ADMIN — THIAMINE HYDROCHLORIDE 500 MG: 100 INJECTION, SOLUTION INTRAMUSCULAR; INTRAVENOUS at 15:01

## 2023-08-12 RX ADMIN — FLUOXETINE 10 MG: 10 CAPSULE ORAL at 09:43

## 2023-08-12 RX ADMIN — PROPOFOL 20 MCG/KG/MIN: 10 INJECTION, EMULSION INTRAVENOUS at 03:20

## 2023-08-12 RX ADMIN — FAMOTIDINE 20 MG: 10 INJECTION INTRAVENOUS at 17:03

## 2023-08-12 RX ADMIN — THIAMINE HYDROCHLORIDE 500 MG: 100 INJECTION, SOLUTION INTRAMUSCULAR; INTRAVENOUS at 21:15

## 2023-08-12 RX ADMIN — POTASSIUM CHLORIDE 20 MEQ: 14.9 INJECTION, SOLUTION INTRAVENOUS at 09:41

## 2023-08-12 RX ADMIN — FUROSEMIDE 20 MG: 10 INJECTION, SOLUTION INTRAMUSCULAR; INTRAVENOUS at 13:14

## 2023-08-12 RX ADMIN — GADOBUTROL 15 ML: 604.72 INJECTION INTRAVENOUS at 04:32

## 2023-08-12 NOTE — PLAN OF CARE
Problem: Potential for Falls  Goal: Patient will remain free of falls  Description: INTERVENTIONS:  - Educate patient/family on patient safety including physical limitations  - Instruct patient to call for assistance with activity   - Consult OT/PT to assist with strengthening/mobility   - Keep Call bell within reach  - Keep bed low and locked with side rails adjusted as appropriate  - Keep care items and personal belongings within reach  - Initiate and maintain comfort rounds  - Make Fall Risk Sign visible to staff  - Offer Toileting every 2 Hours, in advance of need  - Initiate/Maintain bed alarm  - Obtain necessary fall risk management equipment: n/a  - Apply yellow socks and bracelet for high fall risk patients  - Consider moving patient to room near nurses station  Outcome: Progressing     Problem: MOBILITY - ADULT  Goal: Maintain or return to baseline ADL function  Description: INTERVENTIONS:  -  Assess patient's ability to carry out ADLs; assess patient's baseline for ADL function and identify physical deficits which impact ability to perform ADLs (bathing, care of mouth/teeth, toileting, grooming, dressing, etc.)  - Assess/evaluate cause of self-care deficits   - Assess range of motion  - Assess patient's mobility; develop plan if impaired  - Assess patient's need for assistive devices and provide as appropriate  - Encourage maximum independence but intervene and supervise when necessary  - Involve family in performance of ADLs  - Assess for home care needs following discharge   - Consider OT consult to assist with ADL evaluation and planning for discharge  - Provide patient education as appropriate  Outcome: Progressing  Goal: Maintains/Returns to pre admission functional level  Description: INTERVENTIONS:  - Perform BMAT or MOVE assessment daily.   - Set and communicate daily mobility goal to care team and patient/family/caregiver.    - Collaborate with rehabilitation services on mobility goals if consulted  - Perform Range of Motion 3 times a day. - Reposition patient every 2 hours.   - Dangle patient 3 times a day  - Stand patient 3 times a day  - Ambulate patient 3 times a day  - Out of bed to chair 3 times a day   - Out of bed for meals 3 times a day  - Out of bed for toileting  - Record patient progress and toleration of activity level   Outcome: Progressing     Problem: Prexisting or High Potential for Compromised Skin Integrity  Goal: Skin integrity is maintained or improved  Description: INTERVENTIONS:  - Identify patients at risk for skin breakdown  - Assess and monitor skin integrity  - Assess and monitor nutrition and hydration status  - Monitor labs   - Assess for incontinence   - Turn and reposition patient  - Assist with mobility/ambulation  - Relieve pressure over bony prominences  - Avoid friction and shearing  - Provide appropriate hygiene as needed including keeping skin clean and dry  - Evaluate need for skin moisturizer/barrier cream  - Collaborate with interdisciplinary team   - Patient/family teaching  - Consider wound care consult   Outcome: Progressing     Problem: PAIN - ADULT  Goal: Verbalizes/displays adequate comfort level or baseline comfort level  Description: Interventions:  - Encourage patient to monitor pain and request assistance  - Assess pain using appropriate pain scale  - Administer analgesics based on type and severity of pain and evaluate response  - Implement non-pharmacological measures as appropriate and evaluate response  - Consider cultural and social influences on pain and pain management  - Notify physician/advanced practitioner if interventions unsuccessful or patient reports new pain  Outcome: Progressing     Problem: INFECTION - ADULT  Goal: Absence or prevention of progression during hospitalization  Description: INTERVENTIONS:  - Assess and monitor for signs and symptoms of infection  - Monitor lab/diagnostic results  - Monitor all insertion sites, i.e. indwelling lines, tubes, and drains  - Monitor endotracheal if appropriate and nasal secretions for changes in amount and color  - [place] appropriate cooling/warming therapies per order  - Administer medications as ordered  - Instruct and encourage patient and family to use good hand hygiene technique  - Identify and instruct in appropriate isolation precautions for identified infection/condition  Outcome: Progressing     Problem: SAFETY ADULT  Goal: Patient will remain free of falls  Description: INTERVENTIONS:  - Educate patient/family on patient safety including physical limitations  - Instruct patient to call for assistance with activity   - Consult OT/PT to assist with strengthening/mobility   - Keep Call bell within reach  - Keep bed low and locked with side rails adjusted as appropriate  - Keep care items and personal belongings within reach  - Initiate and maintain comfort rounds  - Make Fall Risk Sign visible to staff  - Offer Toileting every 2 Hours, in advance of need  - Initiate/Maintain bed alarm  - Obtain necessary fall risk management equipment: n/a  - Apply yellow socks and bracelet for high fall risk patients  - Consider moving patient to room near nurses station  Outcome: Progressing  Goal: Maintain or return to baseline ADL function  Description: INTERVENTIONS:  -  Assess patient's ability to carry out ADLs; assess patient's baseline for ADL function and identify physical deficits which impact ability to perform ADLs (bathing, care of mouth/teeth, toileting, grooming, dressing, etc.)  - Assess/evaluate cause of self-care deficits   - Assess range of motion  - Assess patient's mobility; develop plan if impaired  - Assess patient's need for assistive devices and provide as appropriate  - Encourage maximum independence but intervene and supervise when necessary  - Involve family in performance of ADLs  - Assess for home care needs following discharge   - Consider OT consult to assist with ADL evaluation and planning for discharge  - Provide patient education as appropriate  Outcome: Progressing  Goal: Maintains/Returns to pre admission functional level  Description: INTERVENTIONS:  - Perform BMAT or MOVE assessment daily.   - Set and communicate daily mobility goal to care team and patient/family/caregiver. - Collaborate with rehabilitation services on mobility goals if consulted  - Perform Range of Motion 3 times a day. - Reposition patient every 2 hours. - Dangle patient 3 times a day  - Stand patient 3 times a day  - Ambulate patient 3 times a day  - Out of bed to chair 3 times a day   - Out of bed for meals 3 times a day  - Out of bed for toileting  - Record patient progress and toleration of activity level   Outcome: Progressing     Problem: DISCHARGE PLANNING  Goal: Discharge to home or other facility with appropriate resources  Description: INTERVENTIONS:  - Identify barriers to discharge w/patient and caregiver  - Arrange for needed discharge resources and transportation as appropriate  - Identify discharge learning needs (meds, wound care, etc.)  - Arrange for interpretive services to assist at discharge as needed  - Refer to Case Management Department for coordinating discharge planning if the patient needs post-hospital services based on physician/advanced practitioner order or complex needs related to functional status, cognitive ability, or social support system  Outcome: Progressing     Problem: Knowledge Deficit  Goal: Patient/family/caregiver demonstrates understanding of disease process, treatment plan, medications, and discharge instructions  Description: Complete learning assessment and assess knowledge base.   Interventions:  - Provide teaching at level of understanding  - Provide teaching via preferred learning methods  Outcome: Progressing     Problem: Nutrition/Hydration-ADULT  Goal: Nutrient/Hydration intake appropriate for improving, restoring or maintaining nutritional needs  Description: Monitor and assess patient's nutrition/hydration status for malnutrition. Collaborate with interdisciplinary team and initiate plan and interventions as ordered. Monitor patient's weight and dietary intake as ordered or per policy. Utilize nutrition screening tool and intervene as necessary. Determine patient's food preferences and provide high-protein, high-caloric foods as appropriate.      INTERVENTIONS:  - Monitor oral intake, urinary output, labs, and treatment plans  - Assess nutrition and hydration status and recommend course of action  - Evaluate amount of meals eaten  - Assist patient with eating if necessary   - Allow adequate time for meals  - Recommend/ encourage appropriate diets, oral nutritional supplements, and vitamin/mineral supplements  - Order, calculate, and assess calorie counts as needed  - Recommend, monitor, and adjust tube feedings and TPN/PPN based on assessed needs  - Assess need for intravenous fluids  - Provide specific nutrition/hydration education as appropriate  - Include patient/family/caregiver in decisions related to nutrition  Outcome: Progressing     Problem: SAFETY,RESTRAINT: NV/NON-SELF DESTRUCTIVE BEHAVIOR  Goal: Remains free of harm/injury (restraint for non violent/non self-detsructive behavior)  Description: INTERVENTIONS:  - Instruct patient/family regarding restraint use   - Assess and monitor physiologic and psychological status   - Provide interventions and comfort measures to meet assessed patient needs   - Identify and implement measures to help patient regain control  - Assess readiness for release of restraint   Outcome: Progressing  Goal: Returns to optimal restraint-free functioning  Description: INTERVENTIONS:  - Assess the patient's behavior and symptoms that indicate continued need for restraint  - Identify and implement measures to help patient regain control  - Assess readiness for release of restraint   Outcome: Progressing

## 2023-08-12 NOTE — ASSESSMENT & PLAN NOTE
Wt Readings from Last 3 Encounters:   08/05/23 (!) 154 kg (339 lb 8.1 oz)   07/19/23 (!) 140 kg (309 lb 9.6 oz)   06/19/23 (!) 137 kg (302 lb)       Echocardiogram of June showed ejection fraction of 65%; mildly elevated right ventricular systolic pressure. Currently patient is not exhibiting shortness of breath. As patient is n.p.o.; will place patient on IV fluids. Monitor daily weights and input output.

## 2023-08-12 NOTE — ASSESSMENT & PLAN NOTE
Patient is on BiPAP at nights. We will continue. As per recent notes from  Primary care in nursing facility:  · Patient denies snoring, PND. · VBG results: Respiratory acidosis with metabolic compensation. Normal pH.    · Suspect could be due to obesity hypoventilation syndrome: BMI 42.78 or undiagnosed CROW   · Patient weaned off BiPAP on 7/29   · Currently On Room air   · Ordered BiPAP at bedtime while inpatient- however, refusing

## 2023-08-12 NOTE — ASSESSMENT & PLAN NOTE
Patient presented from [address] [place] [address] to the ED on 8/11/23 with acute change in MS. He was admitted to [address] [facility] on SLIM service. CC was notified of his neurologic exam via DI alert process. On evaluation, the patient was unresponsive with a GCS of 5.   He was transferred to the ICU and intubated for airway protection  · CT brain on admission without acute abnormality   · Consulted Neuro   · Check MRI  · Continuous vEEG- Contact EMU physician on call and will place on EEG in AM   · Hold on AED at this time- The patient has not had any definitive signs of seizure  · Check UDS  · Ammonia, TSH normal   · Ethanol negative   · LP performed in ED   · WBC minimally elevated  · Glucose normal  · Meningitis panel negative   · Continue supportive care

## 2023-08-12 NOTE — PLAN OF CARE
Problem: Potential for Falls  Goal: Patient will remain free of falls  Description: INTERVENTIONS:  - Educate patient/family on patient safety including physical limitations  - Instruct patient to call for assistance with activity   - Consult OT/PT to assist with strengthening/mobility   - Keep Call bell within reach  - Keep bed low and locked with side rails adjusted as appropriate  - Keep care items and personal belongings within reach  - Initiate and maintain comfort rounds  - Apply yellow socks and bracelet for high fall risk patients  - Consider moving patient to room near nurses station  Outcome: Progressing     Problem: MOBILITY - ADULT  Goal: Maintain or return to baseline ADL function  Description: INTERVENTIONS:  -  Assess patient's ability to carry out ADLs; assess patient's baseline for ADL function and identify physical deficits which impact ability to perform ADLs (bathing, care of mouth/teeth, toileting, grooming, dressing, etc.)  - Assess/evaluate cause of self-care deficits   - Assess range of motion  - Assess patient's mobility; develop plan if impaired  - Assess patient's need for assistive devices and provide as appropriate  - Encourage maximum independence but intervene and supervise when necessary  - Involve family in performance of ADLs  - Assess for home care needs following discharge   - Consider OT consult to assist with ADL evaluation and planning for discharge  - Provide patient education as appropriate  Outcome: Progressing  Goal: Maintains/Returns to pre admission functional level  Description: INTERVENTIONS:  - Perform BMAT or MOVE assessment daily.   - Set and communicate daily mobility goal to care team and patient/family/caregiver.    - Collaborate with rehabilitation services on mobility goals if consulted  - Out of bed for toileting  - Record patient progress and toleration of activity level   Outcome: Progressing     Problem: Prexisting or High Potential for Compromised Skin Integrity  Goal: Skin integrity is maintained or improved  Description: INTERVENTIONS:  - Identify patients at risk for skin breakdown  - Assess and monitor skin integrity  - Assess and monitor nutrition and hydration status  - Monitor labs   - Assess for incontinence   - Turn and reposition patient  - Assist with mobility/ambulation  - Relieve pressure over bony prominences  - Avoid friction and shearing  - Provide appropriate hygiene as needed including keeping skin clean and dry  - Evaluate need for skin moisturizer/barrier cream  - Collaborate with interdisciplinary team   - Patient/family teaching  - Consider wound care consult   Outcome: Progressing     Problem: PAIN - ADULT  Goal: Verbalizes/displays adequate comfort level or baseline comfort level  Description: Interventions:  - Encourage patient to monitor pain and request assistance  - Assess pain using appropriate pain scale  - Administer analgesics based on type and severity of pain and evaluate response  - Implement non-pharmacological measures as appropriate and evaluate response  - Consider cultural and social influences on pain and pain management  - Notify physician/advanced practitioner if interventions unsuccessful or patient reports new pain  Outcome: Progressing     Problem: INFECTION - ADULT  Goal: Absence or prevention of progression during hospitalization  Description: INTERVENTIONS:  - Assess and monitor for signs and symptoms of infection  - Monitor lab/diagnostic results  - Monitor all insertion sites, i.e. indwelling lines, tubes, and drains  - Monitor endotracheal if appropriate and nasal secretions for changes in amount and color  - [place] appropriate cooling/warming therapies per order  - Administer medications as ordered  - Instruct and encourage patient and family to use good hand hygiene technique  - Identify and instruct in appropriate isolation precautions for identified infection/condition  Outcome: Progressing

## 2023-08-12 NOTE — CONSULTS
[facility]  Consult  Name: Hannah Dee 41 y.o. male I MRN: [id number]  Unit/Bed#: ICU 15 I Date of Admission: 8/11/2023   Date of Service: 8/12/2023 I Hospital Day: 1    Consults    Assessment/Plan   * Encephalopathy  Assessment & Plan  Patient presented from [address] [place] [address] to the ED on 8/11/23 with acute change in MS. He was admitted to [address] [place] [address] on SLIM service. CC was notified of his neurologic exam via DI alert process. On evaluation, the patient was unresponsive with a GCS of 5. He was transferred to the ICU and intubated for airway protection  · CT brain on admission without acute abnormality   · Consulted Neuro   · Check MRI  · Continuous vEEG- Contact EMU physician on call and will place on EEG in AM   · Hold on AED at this time- The patient has not had any definitive signs of seizure  · Check UDS  · Ammonia, TSH normal   · Ethanol negative   · LP performed in ED   · WBC minimally elevated  · Glucose normal  · Meningitis panel negative   · Continue supportive care     Acute respiratory failure (HCC)  Assessment & Plan  In the setting of altered MS. Patient intubated on 8/12/23 for airway protection. · CXR showed ETT 5cm above prashant- ETT advanced 2 cm  · Continue AC/VC  · SAT/SBT in the AM  · Continue Propofol and PRN fentanyl for sedation  · Goal RASS 0    Bipolar I disorder (HCC)  Assessment & Plan  · Continue home Prozac and Seroquel     Chronic diastolic heart failure (HCC)  Assessment & Plan  Wt Readings from Last 3 Encounters:   08/05/23 (!) 154 kg (339 lb 8.1 oz)   07/19/23 (!) 140 kg (309 lb 9.6 oz)   06/19/23 (!) 137 kg (302 lb)       · Holding home Torsemide  · Monitor I/O  · Daily weightes      Hypertension  Assessment & Plan  · Hold Torsemide            History of Present Illness     HPI: Hannah Dee is a 41 y.o. who presented from [address] [place] [address] to the ED on 8/11/23 with acute change in MS. He was admitted to [address] [place] [address] on IM service.  CC was notified of his neurologic exam via DI alert process. On evaluation, the patient was unresponsive with a GCS of 5. He was transferred to the ICU and intubated for airway protection    History obtained from unobtainable from patient due to mental status. ROS unobtainable secondary to MS   Historical Information   Past Medical History:  No date:  Anxiety  No date: Cancer ([address])  No date: Depression  No date: Psychiatric disorder      Comment:  bipolar Past Surgical History:  1/20/2023: IR BIOPSY LYMPH NODE  3/14/2023: IR PORT PLACEMENT  12/14/2022: ORCHIECTOMY; Left      Comment:  Procedure: ORCHIECTOMY INGUINAL;  Surgeon: Ghada Sevilla MD;  Location: [facility];  Service: Urology   Current Outpatient Medications   Medication Instructions   • albuterol (ProAir HFA) 90 mcg/act inhaler 2 puffs, Inhalation, Every 6 hours PRN   • albuterol (PROVENTIL HFA,VENTOLIN HFA) 90 mcg/act inhaler 2 puffs, Inhalation, Every 4 hours PRN   • Enoxaparin Sodium (LOVENOX SC) 40 mg, Subcutaneous, Every 12 hours   • FLUoxetine (PROZAC) 10 mg, Oral, Daily   • potassium chloride (K-DUR,KLOR-CON) 20 mEq tablet 40 mEq, Oral, Daily   • QUEtiapine (SEROQUEL) 25 mg, Oral, Daily at bedtime   • senna-docusate sodium (SENOKOT S) 8.6-50 mg per tablet 1 tablet, Oral, Daily at bedtime   • torsemide (DEMADEX) 20 mg, Oral, Daily    No Known Allergies   Social History     Tobacco Use   • Smokeless tobacco: Never   Vaping Use   • Vaping Use: Never used   Substance Use Topics   • Alcohol use: Not Currently     Comment: 2 cases of beer daily, stopped 3 years ago   • Drug use: Yes     Types: Marijuana     Comment: Medical marijuana    Family History   Problem Relation Age of Onset   • Coronary artery disease Maternal Aunt         Objective                            Vitals I/O      Most Recent Min/Max in 24hrs   Temp 97.8 °F (36.6 °C) Temp  Min: 96.1 °F (35.6 °C)  Max: 97.8 °F (36.6 °C)   Pulse 73 Pulse  Min: 65  Max: 92   Resp 18 Resp  Min: 12 Max: 22   BP 94/50 BP  Min: 94/50  Max: 142/93   O2 Sat 99 % SpO2  Min: 98 %  Max: 100 %    No intake or output data in the 24 hours ending 08/12/23 0434      Diet NPO     Invasive Monitoring Physical exam    Physical Exam  Eyes:      Extraocular Movements: Extraocular movements intact. Pupils: Pupils are equal, round, and reactive to light. Skin:     General: Skin is warm. HENT:      Head: Normocephalic and atraumatic. Mouth/Throat:      Mouth: Mucous membranes are moist.   Cardiovascular:      Rate and Rhythm: Normal rate. Pulses: Normal pulses. Abdominal:      Palpations: Abdomen is soft. Constitutional:       Appearance: He is underdeveloped. He is toxic-appearing. Pulmonary:      Comments: K[place]maul respiration patterm  Neurological:      Mental Status: He is unresponsive. Vitals reviewed. Diagnostic Studies      EKG: NSR  Imaging:  I have personally reviewed pertinent reports.    and I have personally reviewed pertinent films in PACS     Medications:  Scheduled PRN   chlorhexidine, 15 mL, Q12H [address]  enoxaparin, 40 mg, Q12H ISAEL  Famotidine (PF), 20 mg, BID  senna, 1 tablet, HS      fentanyl citrate (PF), 50 mcg, Q1H PRN  ondansetron, 4 mg, Q6H PRN       Continuous    multi-electrolyte, 125 mL/hr, Last Rate: 125 mL/hr (08/11/23 2342)  propofol, 5-50 mcg/kg/min         Labs:    CBC    Recent Labs     08/11/23  1656   WBC 9.05   HGB 12.7   HCT 37.9        BMP    Recent Labs     08/11/23  1656   SODIUM 129*   K 3.6   CL 90*   CO2 32   AGAP 7   BUN 8   CREATININE 0.84   CALCIUM 9.3       Coags    No recent results     Additional Electrolytes  Recent Labs     08/12/23  0324   CAIONIZED 1.13          Blood Gas    No recent results  Recent Labs     08/11/23  1656   PHVEN 7.354   DJS2IHG 58.3*   PO2VEN 55.8*   OLM7LZN 31.8*   BEVEN 4.6    LFTs  Recent Labs     08/11/23  1656   *   AST 77*   ALKPHOS 133*   ALB 3.4*   TBILI 0.53       Infectious  Recent Labs 08/11/23  1656   PROCALCITONI 0.11     Glucose  Recent Labs     08/11/23  1656   GLUC 88             Critical Care Time Delivered: Upon my evaluation, this patient had a high probability of imminent or life-threatening deterioration due to respiratory failure, neurologic decline, which required my direct attention, intervention, and personal management. I have personally provided 48 minutes of critical care time, exclusive of procedures, teaching, family meetings, and any prior time recorded by providers other than myself.    Huber Smith PA-C

## 2023-08-12 NOTE — ASSESSMENT & PLAN NOTE
· Patient presents with new onset somnolence from this morning. · Yesterday, according to patient's mother, she was able to speak to him via video chat. · According to girlfriend Jo Satishling over the phone, he has been exhibiting bizarre behavior over the past week. He has a history over the past year since the diagnosis of testicular cancer of cyclical demonstration of bizarre behavior. He has been asking questions that are bizarre such as "are you working in [employer]?"  referring to someone who has not been working there for quite some time. Or that he has been repeating comments and observations that he has just mentioned a few minutes ago. · At this point, the patient seems to have involuntary eyelid closure and when tried to manually open eyelids, he would forcefully close them. He also avoids hitting his head when arm is placed above and let go. He also withdraws to pain. Patient does not demonstrate any focal signs. · We will get neurology consult for an opinion. MRI tomorrow. · ER has attempted lumbar In order to do cerebrospinal fluid studies. · Rule out any urinary tract infections. · Urine drug screen. · B12 and folate. · TSH.

## 2023-08-12 NOTE — ASSESSMENT & PLAN NOTE
Patient on Prozac and Seroquel; due to patient's "change in mental status", would put it on hold. Needs psychiatry consult regarding change in mental status. Noted patient has bizarre behavior over the past week.   (See history)

## 2023-08-12 NOTE — ASSESSMENT & PLAN NOTE
Patient has history of fall; currently on rehab. We will also get physical and Occupational Therapy while here in hospital.  Because of mental status change and bizarre behavior; cognitive Occupational Therapy. Case management.

## 2023-08-12 NOTE — ASSESSMENT & PLAN NOTE
41 y.o. male who has past medical history significant for morbid obesity, testicular cancer (seminoma) with status postchemotherapy, hypertension, bipolar depression, recent admission to [facility] for rehabilitation secondary to fall. He's coming to the ED for worsening somnolence that has been going on for the past several days. Apparently per chart review, patient has had bouts of depression and ashley consistent with his bipolar and has overall slept throughout the day the past several days as well as decreased his oral intake. When he was evaluated in the ED by [facility], he was somnolent but responded to pain in his lower extremities and there was some functional testing that was done including the drop arm on his face and he avoided hurting his face as well as resisting examiner when they were trying to assess V1 strength and patient resisted examiner from letting them open his eyes. Patient was sent to the floors and when patient was examined, patient was noted to be quite somnolent and ICU was called to evaluate and had a GCS of 5 and patient was intubated for airway protection. Neurology was called to evaluate for possible seizure/because of somnolence, patient was already intubated but did not get any benzodiazepines. Patient was agitated and was able to follow commands was cross midline, raising eyebrows, blinking appropriately to command, squeezing hands bilaterally, giving thumbs up and showing 2 fingers, bending bilateral legs appropriately and wiggling toes and patient was able to point to legs that were getting sensory testing. W/U:  CT CAP: Limited inspiratory volume. Interval improvement in bibasilar atelectatic changes. Hepatic steatosis. Stable borderline left para-aortic adenopathy. CTH: No acute intracranial abnormality. Stable small bilateral subcortical hypoattenuating foci. No associated mass effect.   MRI brain w/ and w/o contrast: stable nonspecific enhancement along the anterior floor of the 3rd Ventricle/Hypothalamus when comparing to March 15, 2023. Differential includes chronic toxic metabolic insult (thiamine deficiency), chronic inflammatory/ infectious  conditions. Other considerations include Langerhans cell cell histiocytosis, lymphocytic hypophysitis, and neurosyphilis. Primary CNS lymphoma is a less likely consideration, particularly given the lack of interval change. Serologic correlation is  recommended. UA bland   TSH 2.421 WNL  Procal: 0.11 WNL  Lactic Acid: 1.1 WNL  Ammonia: 21  Ehtanol <3  WBC: 9.05  VB.354/58.3/55.8/31.8    CSF studies  RBC: 2500  WBC: 9  Glucose: 59  Protein: 46  ME panel negative   Negative gram stain CSF    Impression: Unclear etiology of somnolence could possibly be 2/2 to psychiatriac or functional neurologic disorder. Somnolence that could possibly due to a psychiatric/functional neurologic disorder versus thiamine deficiency. Much improvement in exam today with following commands appropriately. Noted to have bilateral vestibular schwannomas on prior MRI imaging.     Plan:   · D/c EEG due to no seizures, mild background slowing  · Thiamine levels and empiric replenishment  · Waiting on B1 level  · Adding ENS 2 to CSF studies to r/o paraneoplastic   · ICU team possibly consider Hem/Onc evaluation  · Plan to extubate today  · Would hold off on any AEDs or benzodiazepines at this time given patient is able to appropriately follow commands  · Rest of care per primary  · Agree with having psychiatry evaluate patient for possible catatonia/functional neurologic disorder

## 2023-08-12 NOTE — RESPIRATORY THERAPY NOTE
RT Ventilator Management Note  Shireen Pleitez 41 y.o. male MRN: [id number]  Unit/Bed#: ICU 12 Encounter: [id number]      Daily Screen         8/12/2023  1115 8/12/2023  8027          Patient safety screen outcome[de-identified] Failed Failed      Not Ready for Weaning due to[de-identified] Underline problem not resolved Underline problem not resolved                Physical Exam:   Assessment Type: Assess only  General Appearance: Drowsy  Respiratory Pattern: Assisted  Chest Assessment: Chest expansion asymmetrical  Bilateral Breath Sounds: Clear  Cough: Non-productive  O2 Device: PB 7200 vent      Resp Comments: Routine vent check this afternoon. Informed to hold from any furrther weaning attempts this day; likely to reinitiate tomorrow. Pt remaining on AC / VC of 17 x 450 x 60% and 6 PEEP. Some secretion management issues sporadically causing high pressure alarms to sound. Alarm limits readjusted and documented here. No change in BS's. Vent alarms on and tested. Will continue to monitor per policy.

## 2023-08-12 NOTE — UTILIZATION REVIEW
Initial Clinical Review    Admission: Date/Time/Statement:   Admission Orders (From admission, onward)     Ordered        23  Inpatient Admission  Once                      Orders Placed This Encounter   Procedures   • Inpatient Admission     Standing Status:   Standing     Number of Occurrences:   1     Order Specific Question:   Level of Care     Answer:   Med Surg [16]     Order Specific Question:   Estimated length of stay     Answer:   More than 2 Midnights     Order Specific Question:   Certification     Answer:   I certify that inpatient services are medically necessary for this patient for a duration of greater than two midnights. See H&P and MD Progress Notes for additional information about the patient's course of treatment. ED Arrival Information     Expected   2023     Arrival   2023 16:13    Acuity   Emergent            Means of arrival   Ambulance    Escorted by   [facility]    Service   Critical Care/ICU    Admission type   Emergency            Arrival complaint   Unresponsive           Chief Complaint   Patient presents with   • Altered Mental Status     Pt from rehab facility - presents with unresponsiveness. Staff reported to EMS that he has been unresponsive since 11am. Pt hasnt been wearing cpap machine, and after attempts to use it on him without improvement - called ems. Initial Presentation: 41 y.o. male presents to ed from acute inpatient rehab via ems for evaluation and treatment of altered mental status. Staff reports lethargy this am and later ( approximately 11 am)  found him unresponsive. Did not respond to cpap. PMHX: metastatic testicular cancer s/p L orchiectomy, bipolar , heart failure. Clinical assessment significant for rectal temp 96.1, responding only to noxious stimuli, snoring, soiled diaper, gcs = 3.  , AST 77, , ALK PHOS 133, VB.354 / 58.3 / 55.8 / 31.8  Note MRI 11 days ago without acute finding.   Lumbar puncture completed. Admit to inpatient med surg for encephalopathy. Plan to consult neurology, MRI, BIPAP, consult psychiatry, PT/OT evaluations, NPO, iv fluids. Date: 8-12-23  Day 2: inpatient transferred to critical care  Neurology consulted due to somnolence with etiology possibly psychiatric vs functional vs seizure. Start video EEG monitoring and obtain MRI. Hold off on anti epilepticdrugs or benzodiazepines for now. LP with minimally elevated WBC. Continue neuro checks. Intubated and sedated  overnight for airway protection- patient with BMI 41.33 and possible CROW. Start daily awakening trial.   Continue prozac  And seroquel for bipolar disorder. Holding torsemide. Weight  has increased 309 lb to 339 lb in approximately 2 weeks - monitor I/O and daily wt. Na improved to 134. Na 3.2 received iv kdl 20 meq x1. Continue iv multi electrolyte 125/hr.  NPO. Non violent restraint.      ED Triage Vitals   08/11/23 1642 08/11/23 1625 08/11/23 1625 08/11/23 1625 08/11/23 1625   (!) 96.1 °F   (35.6 °C) 65 12 142/93 100 %      Rectal Monitor         Pain Score          08/05/23 (!) 154 kg (339 lb 8.1 oz)     Additional Vital Signs:     Patient Vitals for the past 24 hrs:   BP Temp Pulse Resp SpO2   08/12/23 0900 107/65 -- 70 17 100 %   08/12/23 0800 104/67 98.4 °F (36.9 °C) 62 16 100 %   08/12/23 0700 105/68 -- 62 17 100 %   08/12/23 0600 102/61 -- 66 17 100 %   08/12/23 0500 103/60 -- 72 17 100 %   08/12/23 0458 -- -- -- -- 100 %   08/12/23 0400 94/50 -- 73 18 99 %   08/12/23 0330 98/53 -- 78 15 100 %   08/12/23 0325 -- -- -- 20 --   08/12/23 0300 114/67 97.8 °F (36.6 °C) 92 19 100 %   08/12/23 0258 -- -- -- -- 100 %   08/12/23 0101 112/72 97.7 °F (36.5 °C) 71 -- 98 %   08/11/23 2314 112/76 (!) 97.4 °F (36.3 °C) 75 -- 99 %   08/11/23 2312 95/54 (!) 97.4 °F (36.3 °C) 71 -- 98 %   08/11/23 2242 -- (!) 97 °F (36.1 °C) -- -- --   08/11/23 2023 -- (!) 96.8 °F (36 °C) -- -- --   08/11/23 1930 111/66 -- 72 18 98 % 08/11/23 1915 133/76 -- 70 21 98 %   08/11/23 1907 101/64 -- 70 22 100 %   08/11/23 1732 126/64 -- 69 12 100 %   08/11/23 1642 -- (!) 96.1 °F (35.6 °C) -- -- --   08/11/23 1625 142/93 -- 65 12 100 %     Date and Time Eye Opening Best Verbal Response Best Motor Response [place] Coma Scale Score   08/12/23 0800 2 1 6 9   08/12/23 0700 2 1 6 9   08/12/23 0600 2 1 6 9   08/12/23 0500 2 1 6 9   08/12/23 0400 3 1 6 10   08/12/23 0315 4 1 6 11   08/12/23 0300 1 1 4 6   08/11/23 1732 1 1 3 5   08/11/23 1634 1 1 3 5     Pertinent Labs/Diagnostic Test Results:       EKG   8-11 -23   Sinus rhythm with 1st degree A-V block   Otherwise normal ECG   When compared with ECG of 04-MAY-2023 09:06,   WV interval has increased       CT head without contrast   Final (08/11 2026)      No acute intracranial abnormality. Stable small bilateral subcortical hypoattenuating foci. No associated mass effect. CT chest abdomen pelvis w contrast   Final (08/11 2037)      Limited inspiratory volume. Interval improvement in bibasilar atelectatic changes. Hepatic steatosis.           Results from last 7 days   Lab Units 08/12/23  0534 08/11/23  1656   WBC Thousand/uL 5.97 9.05   HEMOGLOBIN g/dL 11.7* 12.7   HEMATOCRIT % 34.2* 37.9   PLATELETS Thousands/uL 253 210   NEUTROS ABS Thousands/µL 4.38 6.50         Results from last 7 days   Lab Units 08/12/23  0534 08/12/23  0324 08/11/23  1656   SODIUM mmol/L 134*  --  129*   POTASSIUM mmol/L 3.2*  --  3.6   CHLORIDE mmol/L 96  --  90*   CO2 mmol/L 31  --  32   ANION GAP mmol/L 7  --  7   BUN mg/dL 6  --  8   CREATININE mg/dL 0.67  --  0.84   EGFR ml/min/1.73sq m 123  --  112   CALCIUM mg/dL 8.6  --  9.3   CALCIUM, IONIZED mmol/L  --  1.13  --    MAGNESIUM mg/dL 2.1  --   --    PHOSPHORUS mg/dL 3.6  --   --      Results from last 7 days   Lab Units 08/12/23  0534 08/11/23  1656   AST U/L 64* 77*   ALT U/L 142* 172*   ALK PHOS U/L 102 133*   TOTAL PROTEIN g/dL 5.9* 7.5   ALBUMIN g/dL 2.7* 3.4* TOTAL BILIRUBIN mg/dL 0.74 0.53   AMMONIA umol/L  --  21     Results from last 7 days   Lab Units 08/11/23  1626   POC GLUCOSE mg/dl 89     Results from last 7 days   Lab Units 08/12/23  0534 08/11/23  1656   GLUCOSE RANDOM mg/dL 83 88       Results from last 7 days   Lab Units 08/12/23  0600   PH ART  7.473*   PCO2 ART mm Hg 45.9*   PO2 ART mm Hg 100.2   HCO3 ART mmol/L 32.9*   BASE EXC ART mmol/L 8.2   O2 CONTENT ART mL/dL 16.7   O2 HGB, ARTERIAL % 96.8   ABG SOURCE  Radial, Right     Results from last 7 days   Lab Units 08/11/23  1656   PH SYLVESTER  7.354   PCO2 SYLVESTER mm Hg 58.3*   PO2 SYLVESTER mm Hg 55.8*   HCO3 SYLVESTER mmol/L 31.8*   BASE EXC SYLVESTER mmol/L 4.6   O2 CONTENT SYLVESTER ml/dL 15.5   O2 HGB, VENOUS % 81.5*       Results from last 7 days   Lab Units 08/12/23  0534 08/11/23  1656   TSH 3RD GENERATON uIU/mL 2.084 2.421     Results from last 7 days   Lab Units 08/11/23  1656   PROCALCITONIN ng/ml 0.11     Results from last 7 days   Lab Units 08/11/23  1656   LACTIC ACID mmol/L 1.1         Results from last 7 days   Lab Units 08/12/23  0316 08/11/23  2020   CLARITY UA  Clear Clear   COLOR UA  Light Yellow Colorless   SPEC GRAV UA  1.044* 1.017   PH UA  5.5 6.5   GLUCOSE UA mg/dl Negative Negative   KETONES UA mg/dl Negative Negative   BLOOD UA  Small* Negative   PROTEIN UA mg/dl Trace* Negative   NITRITE UA  Negative Negative   BILIRUBIN UA  Negative Negative   UROBILINOGEN UA (BE) mg/dl <2.0 <2.0   LEUKOCYTES UA  Negative Negative   WBC UA /hpf 1-2  --    RBC UA /hpf 4-10*  --    BACTERIA UA /hpf None Seen  --    EPITHELIAL CELLS WET PREP /hpf None Seen  --      Results from last 7 days   Lab Units 08/11/23  1656   ETHANOL LVL mg/dL <3       Results from last 7 days   Lab Units 08/11/23 2234   GRAM STAIN RESULT  Rare Polys  Rare Mononuclear Cells  No bacteria seen       Results from last 7 days   Lab Units 08/11/23  2234   APPEARANCE CSF  light pink   TUBE NUM CSF  4   WBC CSF /uL 9*   XANTHOCHROMIA  No   NEUTROPHILS % (CSF) % 17   LYMPHS % (CSF) % 78   MONOCYTES % (CSF) % 5   GLUCOSE CSF mg/dL 59   PROTEIN CSF mg/dL 46*   RBC CSF uL 2,500*           ED Treatment:   Medication Administration from 08/11/2023 1612 to 08/11/2023 2306       Date/Time Order Dose Route Action     08/11/2023 2102 EDT lidocaine (PF) (XYLOCAINE-MPF) 1 % injection 10 mL 10 mL Infiltration Given by Other        Past Medical History:   Diagnosis   • Anxiety   • Cancer ([address])   • Depression   • Psychiatric disorder    bipolar     Present on Admission:  • Bipolar I disorder ([address])  • Cerebral gliosis  • Chronic diastolic heart failure (HCC)  • Fall  • Hypertension  • Obesity hypoventilation syndrome (HCC)  • 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HCC)  • Encephalopathy  • Hyponatremia  • Unilateral vestibular schwannoma (HCC)      Admitting Diagnosis:     Somnolence [R40.0]  Catatonic reaction [F06.1]  Acute encephalopathy [G93.40]  AMS (altered mental status) [R41.82]    Age/Sex: 41 y.o. male    Scheduled Medications:    chlorhexidine, 15 mL, Mouth/Throat, Q12H ISAEL  enoxaparin, 40 mg, Subcutaneous, Q12H ISAEL  Famotidine (PF), 20 mg, Intravenous, BID  FLUoxetine, 10 mg, Oral, Daily  potassium chloride, 20 mEq, Intravenous, Q2H  QUEtiapine, 25 mg, Oral, HS  senna, 1 tablet, Oral, HS      Continuous IV Infusions:  multi-electrolyte, 125 mL/hr, Intravenous, Continuous  propofol, 5-50 mcg/kg/min, Intravenous, Titrated      PRN Meds:  fentanyl citrate (PF), 50 mcg, Intravenous, Q1H PRN  ondansetron, 4 mg, Intravenous, Q6H PRN        IP CONSULT TO PSYCHIATRY  IP CONSULT TO NEUROLOGY  IP CONSULT TO CASE MANAGEMENT  IP CONSULT TO CASE MANAGEMENT    Network Utilization Review Department  ATTENTION: Please call with any questions or concerns to [phone] and carefully listen to the prompts so that you are directed to the right person.  All voicemails are confidential.  Regis Russo all requests for admission clinical reviews, approved or denied determinations and any other requests to dedicated fax number below belonging to the campus where the patient is receiving treatment.  List of dedicated fax numbers for the Facilities:  [place] [facility] (Administrative/Medical Necessity) [phone]   [address] (Maternity/NICU/Pediatrics) [phone]   [address] [place] [address] [phone]   [facility] [address] [facility] [phone]   [address] [place] [address] [id number] [place] [address] [phone]   [address] [place] [address] [phone]

## 2023-08-12 NOTE — QUICK NOTE
NEUROLOGY RESIDENCY OVERNIGHT QUICK NOTE     Name: Gianna Croft   Age & Sex: 41 y.o. male   MRN: [id number]  Unit/Bed#: ICU 12   Encounter: [id number]  Length of Stay: 1    ASSESSMENT & PLAN     Somnolence  Assessment & Plan  41 y.o. male who has past medical history significant for morbid obesity, testicular cancer (seminoma) with status postchemotherapy, hypertension, bipolar depression, recent admission to [facility] for rehabilitation secondary to fall. He's coming to the ED for worsening somnolence that has been going on for the past several days. Apparently per chart review, patient has had bouts of depression and ashley consistent with his bipolar and has overall slept throughout the day the past several days as well as decreased his oral intake. When he was evaluated in the ED by [facility], he was somnolent but responded to pain in his lower extremities and there was some functional testing that was done including the drop arm on his face and he avoided hurting his face as well as resisting examiner when they were trying to assess V1 strength and patient resisted examiner from letting them open his eyes. Patient was sent to the floors and when patient was examined, patient was noted to be quite somnolent and ICU was called to evaluate and had a GCS of 5 and patient was intubated for airway protection. Neurology was called to evaluate for possible seizure/because of somnolence, patient was already intubated but did not get any benzodiazepines. Patient was agitated and was able to follow commands was cross midline, raising eyebrows, blinking appropriately to command, squeezing hands bilaterally, giving thumbs up and showing 2 fingers, bending bilateral legs appropriately and wiggling toes and patient was able to point to legs that were getting sensory testing. W/U:  CT CAP: Limited inspiratory volume. Interval improvement in bibasilar atelectatic changes. Hepatic steatosis.  Stable borderline left para-aortic adenopathy. CTH: No acute intracranial abnormality. Stable small bilateral subcortical hypoattenuating foci. No associated mass effect. MRI brain w/ and w/o contrast: pending     UA bland   TSH 2.421 WNL  Procal: 0.11 WNL  Lactic Acid: 1.1 WNL  Ammonia: 21  Ehtanol <3  WBC: 9.05  VB.354/58.3/55.8/31.8    CSF studies  RBC: 2500  WBC: 9  Glucose: 59  Protein: 46  ME panel negative   Negative gram stain CSF    Impression: Somnolence that could possibly due to a psychiatric/functional neurologic disorder versus possible seizure and patient could have been in a postictal state that was causing patient started somnolence; possibly lower on the differential for noted seizures/status of LOC as given patient had positive signs on functional neurologic testing as well as is able to follow commands appropriately      Plan   Discussed plan w/ Dr. Mckeon Other   - recommend getting MRI brain w/ and w/o contrast given hx of seminoma to r/o structural abnl as well as any abnormal contrast staining   - Recommended video EEG to help rule out any possible seizure/postictal state  -Would hold off on any AEDs or benzodiazepines at this time given patient is able to appropriately follow commands and will evaluate EEG findings  - rest of care as per primary   - Agree with having psychiatry evaluate patient for possible catatonia/functional neurologic disorder  -Formal neurology evaluation to follow in the daytime by neurology daytime team          SUBJECTIVE     HPI: Lois Mendes is a 41 y.o. male past medical history significant for morbid obesity, testicular cancer (seminoma) with status postchemotherapy, hypertension, bipolar depression, recent admission to [facility] for rehabilitation secondary to fall. He's coming here from rehab for noted worsening somnolence and altered mental status.  He's sleeping a lot and minmally eating per chart review and has been experiencing bipolar (ashley and depression) More recently over the past few weeks he's been having cognitive changes and bizarre behavior and losing his balance more. He was not following commands when evaluated by the ED but resisted his eyes being open when eyelid strength was assessed. Apparently, in the ED the primary team attempted some functional testing and lifted his arm above head and dropped it and patient avoided hitting his head. Since coming to the floors, he has remained somnolent and had a GCS of 5 requiring intubation. Neurology  was asked to assess for possible seizures. When evaluated, patient was intubated but able to follow commands such as crossing eyes midline, raising eyebrows, squeezing hands, giving a thumbs up and moving his LE to commands. ROS unable to obtain due to patient being intubated and agitated      Historical Information   Past Medical History:   Diagnosis Date   • Anxiety    • Cancer [facility])    • Depression    • Psychiatric disorder     bipolar     Past Surgical History:   Procedure Laterality Date   • IR BIOPSY LYMPH NODE  1/20/2023   • IR PORT PLACEMENT  3/14/2023   • ORCHIECTOMY Left 12/14/2022    Procedure: ORCHIECTOMY INGUINAL;  Surgeon: Brittany Ibarra MD;  Location: [facility];  Service: Urology     Social History   Social History     Substance and Sexual Activity   Alcohol Use Not Currently    Comment: 2 cases of beer daily, stopped 3 years ago     Social History     Substance and Sexual Activity   Drug Use Yes   • Types: Marijuana    Comment: Medical marijuana     E-Cigarette/Vaping   • E-Cigarette Use Never User      E-Cigarette/Vaping Substances     Social History     Tobacco Use   Smoking Status Not on file   Smokeless Tobacco Never         OBJECTIVE     Patient ID: Devi Fletcher is a 41 y.o. male.     Vitals:   Vitals:    08/12/23 0300 08/12/23 0325 08/12/23 0330 08/12/23 0400   BP: 114/67  98/53    BP Location:       Pulse: 92  78 73   Resp: 19 20 15 18   Temp:       TempSrc: SpO2: 100%  100% 99%      There is no height or weight on file to calculate BMI. No intake or output data in the 24 hours ending 23 0408    Temperature:   Temp (24hrs), Av.1 °F (36.2 °C), Min:96.1 °F (35.6 °C), Max:97.7 °F (36.5 °C)    Temperature: 97.7 °F (36.5 °C)    Neurologic Exam     Mental Status   Patient intubated       Cranial Nerves     CN II   Visual fields full to confrontation. CN III, IV, VI   Pupils are equal, round, and reactive to light. Extraocular motions are normal.     CN V   Facial sensation intact. CN VII   Facial expression full, symmetric.      CN VIII   CN VIII normal.     CN XI   CN XI normal.       Patient was able to cross midline without any issues  Patient was gagging and had appropriate cough as well as biting down on his ET tube  Patient was grimacing appropriately when painful stimulation applied     Motor Exam   Antigravity for all 4 extremities  Difficult to assess formal motor testing as patient was quite agitated with just being recently intubated     Sensory Exam   Light touch normal.   Pinprick normal.       Patient was able to appropriately to point to which legs were being given noxious stimuli     Gait, Coordination, and Reflexes     Reflexes   Right brachioradialis: 1+  Left brachioradialis: 1+  Right biceps: 1+  Left biceps: 1+  Right triceps: 1+  Left triceps: 1+  Right patellar: 1+  Left patellar: 1+  Right achilles: 1+  Left achilles: 1+  Right plantar: normal  Left plantar: normal  Right ankle clonus: absent  Left ankle clonus: absent             LABORATORY DATA     Results from last 7 days   Lab Units 23  1656   WBC Thousand/uL 9.05   HEMOGLOBIN g/dL 12.7   HEMATOCRIT % 37.9   PLATELETS Thousands/uL 210   NEUTROS PCT % 72   MONOS PCT % 9   EOS PCT % 1      Results from last 7 days   Lab Units 23  1656 23  0456   POTASSIUM mmol/L 3.6 3.5   CHLORIDE mmol/L 90* 87*   CO2 mmol/L 32 35*   BUN mg/dL 8 13   CREATININE mg/dL 0.84 0.77 CALCIUM mg/dL 9.3 9.0   ALK PHOS U/L 133*  --    ALT U/L 172*  --    AST U/L 77*  --                   Results from last 7 days   Lab Units 08/11/23  1656   LACTIC ACID mmol/L 1.1           Ambrose Reynolds DO   [facility]'s Neurology Residency, PGY-3

## 2023-08-12 NOTE — RESPIRATORY THERAPY NOTE
RT Ventilator Management Note  Lois Mendes 41 y.o. male MRN: [id number]  Unit/Bed#: ICU 12 Encounter: [id number]    Morning vent check. Vt volume and RR vent change since last check now documented and consistent with orders. No other mode or parameter changes at this time. According to RN plan is to try to extubate this shift. Vent alarms on and tested. 08/12/23 0823   Respiratory Assessment   Assessment Type Assess only   General Appearance Sedated   Respiratory Pattern Assisted   Chest Assessment Chest expansion asymmetrical   Bilateral Breath Sounds Clear   Cough Non-productive   Resp Comments Morning vent check. Vt volume and RR vent change since last check now documented and consistent with orders. No other mode or parameter changes at this time. According to RN plan is to try to extubate this shift. Vent alarms on and tested.    O2 Device PB 7200 vent   Vent Information   Vent ID [id number]   Vent type     Vent Mode AC/VC   $ Pulse Oximetry Spot Check Charge Completed   AC/VC Settings   Resp Rate (BPM) (S)  17 BPM   Vt (mL) 450 mL   FIO2 (%) (S)  60 %   PEEP (cmH2O) 6 cmH2O   Flow Pattern (LPM) 60 L/min   Trigger Sensitivity Flow (lpm) 3 %   Heater Temperature (Set) 98.6 °F (37 °C)   AC/VC Actuals   Resp Rate (BPM) 17 BPM   VT (mL) 473   MV 8.26   MAP (cmH2O) 11 cmH2O   Peak Pressure (cmH2O) 27 cmH2O   I/E Ratio (Obs) 1:3.3   Heater Temperature (Obs) 98.6 °F (37 °C)   Static Compliance (mL/cmH20) 40 mL/cmH2O   Plateau Pressure (cm H2O) 19 cm H2O   AC/VC Alarms   High Peak Pressure (cmH2O) 45   High Resp Rate (BPM) 35 BPM   High MV (L/min) 25 L/min   Low MV (L/min) 5 L/min   Vt High (mL) 1200 mL   Vt Low (mL) 400 mL   AC/VC Apnea Settings   Resp Rate (BPM) 12 BPM   VT (mL) 550 mL   FIO2 (%) 100 %   Apnea Time (s) 20 S   Apnea Flow (L/min) 60 L/min   Maintenance   Alarm (pink) cable attached Yes   Resuscitation bag with peep valve at bedside Yes   Water bag changed No   Circuit changed No IHI Ventilator Associated Pneumonia Bundle   Daily Awakening Trials Performed Yes   Daily Assessment of Readiness to Extubate Yes   Head of Bed Elevated HOB 30   ETT  Cuffed; Hi-Lo 8 mm   Placement Date/Time: 08/12/23 (c) 9246   Type: Cuffed; Hi-Lo  Tube Size: 8 mm  Location: Oral  Insertion attempts: 1  Placement Verification: Chest x-ray;End tidal CO2;Symmetrical chest wall movement  Secured at (cm): 23CM   Secured at (cm) 27   Measured from Lips   Secured Location Left   Secured by Commercial tube larson   Site Condition Dry   Cuff Pressure (cm H2O)   (green)   HI-LO Suction  Intermittent suction   HI-LO Secretions Scant   HI-LO Intervention Patent       Daily Screen    No data found in the last 10 encounters. Physical Exam:   Assessment Type: Assess only  General Appearance: Sedated  Respiratory Pattern: Assisted  Chest Assessment: Chest expansion asymmetrical  Bilateral Breath Sounds: Clear  Cough: Non-productive  O2 Device: PB 7200 vent      Resp Comments: Vigorously stimulated pt during this first weaning attempt with sedatives all turned off earlier this AM.  Despite attempts to incite to breath, and with pt's eyes wide open and appropriate blinking when asked yes and no questions, pt just was not triggering any spontaneous breaths in CPAP SPONT mode nor in SIMV mode (tried for 2 mins on CPAP and 30 seconds with SIMV). Will return later this AM to reattempt.

## 2023-08-12 NOTE — CASE MANAGEMENT
Case Management Assessment & Discharge Planning Note    Patient name Shireen Pleitez  [facility] ICU 12/ICU 12 MRN [id number]  : 1985 Date 2023       Current Admission Date: 2023  Current Admission Diagnosis:Encephalopathy   Patient Active Problem List    Diagnosis Date Noted   • Encephalopathy 2023   • Acute respiratory failure ([address]) 2023   • Catatonic reaction    • Somnolence 2023   • Constipation 2023   • Wheezing 2023   • Testicular cancer ([address]) 2023   • Obesity hypoventilation syndrome ([address]) 2023   • Fall 2023   • Hyponatremia 2023   • Hypokalemia 2023   • Bipolar I disorder ([address]) 2023   • (HFpEF) heart failure with preserved ejection fraction ([address]) 2023   • Chronic diastolic heart failure ([address]) 2023   • Palpitations 2023   • LVH (left ventricular hypertrophy) 2023   • Pure hypertriglyceridemia 2023   • Cerebral gliosis 2023   • Unilateral vestibular schwannoma ([address]) 2023   • Port-A-Cath in place 2023   • Diplopia 2023   • 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   • Urinary hesitancy    • Umbilical hernia without obstruction and without gangrene 12/10/2022   • Tobacco use 12/10/2022   • Retroperitoneal lymphadenopathy 12/10/2022   • Hypertension 12/10/2022      LOS (days): 1  Geometric Mean LOS (GMLOS) (days):   Days to GMLOS:     OBJECTIVE:  PATIENT READMITTED TO HOSPITAL  Risk of Unplanned Readmission Score: 29.36         Current admission status: Inpatient       Preferred Pharmacy:   [address], [address]  Phone: [phone] Fax: [phone]    Primary Care Provider: Liset Reyes MD    Primary Insurance: [address]  Secondary Insurance:     ASSESSMENT:  Active Health Care Proxies     Nolan CR[address] - Significant Other   Primary Phone: [phone] (Mobile)               Advance Directives  Does patient have a [address]?: No  Was patient offered paperwork?: Yes  Does patient currently have a Health Care decision maker?: Yes, please see Health Care Proxy section  Does patient have Advance Directives?: No  Was patient offered paperwork?: Yes  Primary Contact: Michael Gonzalez ([facility]) [phone]         Readmission Root Cause  30 Day Readmission: Yes  Who directed you to return to the hospital?: Specialist  Did you understand whom to contact if you had questions or problems?: Yes  Did you get your prescriptions before you left the hospital?: Yes  Were you able to get your prescriptions filled when you left the hospital?: Yes  Did you take your medications as prescribed?: Yes  Were you able to get to your follow-up appointments?: Yes  During previous admission, was a post-acute recommendation made?: Yes  What post-acute resources were offered?: STR (d/c to [facility])  Patient was readmitted due to: encephalopathy while at [facility]: Intubated, Neuroo plan, therapy recs    Patient Information  Admitted from[de-identified] Facility  Mental Status: Alert  During Assessment patient was accompanied by: Not accompanied during assessment  Assessment information provided by[de-identified] Patient  Primary Caregiver: Self  Support Systems: Spouse/significant other, Parent, [address] of Residence: [facility] [address] [facility] do you live in?: [facility] entry access options.  Select all that apply.: Stairs  Number of steps to enter home.: 8  Do the steps have railings?: Yes  Type of Current Residence: 2 story home  Upon entering residence, is there a bedroom on the main floor (no further steps)?: No  A bedroom is located on the following floor levels of residence (select all that apply):: 2nd Floor  Upon entering residence, is there a bathroom on the main floor (no further steps)?: No  Indicate which floors of current residence have a bathroom (select all the apply):: 2nd Floor  Number of steps to 2nd floor from main floor: One Flight  In the last 12 months, was there a time when you were not able to pay the mortgage or rent on time?: No  In the last 12 months, how many places have you lived?: 1  In the last 12 months, was there a time when you did not have a steady place to sleep or slept in a shelter (including now)?: No  Homeless/housing insecurity resource given?: N/A  Living Arrangements: Lives w/ Spouse/significant other  Is patient a ?: No    Activities of Daily Living Prior to Admission  Functional Status: Independent  Completes ADLs independently?: Yes  Ambulates independently?: Yes  Does patient use assisted devices?: No  Does patient currently own DME?: No  Does patient have a history of Outpatient Therapy (PT/OT)?: Yes  Does the patient have a history of Short-Term Rehab?: Yes  Does patient have a history of HHC?: No  Does patient currently have [address]?: No    Patient Information Continued  Income Source: Unknown  Does patient have prescription coverage?: Yes  Within the past 12 months, you worried that your food would run out before you got the money to buy more.: Never true  Within the past 12 months, the food you bought just didn't last and you didn't have money to get more.: Never true  Food insecurity resource given?: N/A  Does patient receive dialysis treatments?: No  Does patient have a history of substance abuse?: No  Does patient have a history of Mental Health Diagnosis?: Yes (Bi-Polar)  Is patient receiving treatment for mental health?: No. Patient declined treatment information.   Has patient received inpatient treatment related to mental health in the last 2 years?: No    Means of Transportation  In the past 12 months, has lack of transportation kept you from medical appointments or from getting medications?: No  In the past 12 months, has lack of transportation kept you from meetings, work, or from getting things needed for daily living?: No  Was application for public transport provided?: N/A    DISCHARGE DETAILS:  Freedom of Choice: Yes     CM contacted family/caregiver?: Yes  Were Treatment Team discharge recommendations reviewed with patient/caregiver?: Yes  Did patient/caregiver verbalize understanding of patient care needs?: N/A- going to facility  Were patient/caregiver advised of the risks associated with not following Treatment Team discharge recommendations?: Yes    Contacts  Patient Contacts: Sabine Gottlieb (Semaj) [phone]  Relationship to Patient[de-identified] Family  Contact Method: Phone  [facility]  Reason/Outcome: Discharge Planning, Emergency Contact, Continuity of Care    Pt intubated at this time  Pt lives with his s/o in a 2 story home which has 7-8 ADIEL and inside has 12-13 to the 2nd floor. Pt's bedroom and bathroom (tub with standard toilet) are on the 2nd floor. 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 Pt was independent prior to previous admission, where he discharged to [facility] for IP rehab. Pt was at [facility] prior to this admission. Pt's had 1 fall in the last 6 months. Pt owns no DME. Pt enjoys his family. Pt has dx of Bi-Polar disorder but no recent IP Psych    CM will follow    CM reviewed d/c planning process including the following: identifying help at home, patient preference for d/c planning needs, Discharge Lounge, [facility] Meds to Bed program, availability of treatment team to discuss questions or concerns patient and/or family may have regarding understanding medications and recognizing signs and symptoms once discharged. CM also encouraged patient to follow up with all recommended appointments after discharge. Patient advised of importance for patient and family to participate in managing patient’s medical well being.

## 2023-08-12 NOTE — ASSESSMENT & PLAN NOTE
In the setting of altered MS. Patient intubated on 8/12/23 for airway protection.    · CXR showed ETT 5cm above prashant- ETT advanced 2 cm  · Continue AC/VC  · SAT/SBT in the AM  · Continue Propofol and PRN fentanyl for sedation  · Goal RASS 0

## 2023-08-12 NOTE — RESPIRATORY THERAPY NOTE
RT Protocol Note  Mendez Nettles 41 y.o. male MRN: [id number]  Unit/Bed#: ICU 12 Encounter: [id number]    Assessment    Principal Problem:    Encephalopathy  Active Problems:    Hypertension    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[address])    Cerebral gliosis    Chronic diastolic heart failure (HCC)    Fall    Bipolar I disorder ([address])    Obesity hypoventilation syndrome (HCC)    Somnolence    Acute respiratory failure (HCC)      Home Pulmonary Medications:  Albuterol MDI. Past Medical History:   Diagnosis Date    Anxiety     Cancer ([address])     Depression     Psychiatric disorder     bipolar     Social History     Socioeconomic History    Marital status: Single     Spouse name: Not on file    Number of children: Not on file    Years of education: Not on file    Highest education level: Not on file   Occupational History    Not on file   Tobacco Use    Smoking status: Not on file    Smokeless tobacco: Never   Vaping Use    Vaping Use: Never used   Substance and Sexual Activity    Alcohol use: Not Currently     Comment: 2 cases of beer daily, stopped 3 years ago    Drug use: Yes     Types: Marijuana     Comment: Medical marijuana    Sexual activity: Not Currently   Other Topics Concern    Not on file   Social History Narrative    ** Merged History Encounter **          Social Determinants of Health     Financial Resource Strain: Not on file   Food Insecurity: No Food Insecurity (8/1/2023)    Hunger Vital Sign     Worried About Running Out of Food in the Last Year: Never true     Ran Out of Food in the Last Year: Never true   Transportation Needs: No Transportation Needs (8/1/2023)    PRAPARE - Transportation     Lack of Transportation (Medical): No     Lack of Transportation (Non-Medical):  No   Physical Activity: Not on file   Stress: Not on file   Social Connections: Not on file   Intimate Partner Violence: Not on file   Housing Stability: Low Risk  (8/1/2023)    Housing Stability Vital Sign     Unable to Pay for Housing in the Last Year: No     Number of Places Lived in the Last Year: 1     Unstable Housing in the Last Year: No       Subjective         Objective    Physical Exam:   Assessment Type: Assess only  Cough: Non-productive    Vitals:  Blood pressure 94/50, pulse 73, temperature 97.8 °F (36.6 °C), temperature source Bladder, resp. rate 18, SpO2 100 %. Imaging and other studies: I have personally reviewed pertinent reports. Plan    Respiratory Plan: Vent/NIV/HFNC        Resp Comments: Pt. evaluated for respiratory protocol. BS=sl coarse and well aerated. Pt. in no distress on A/C. QuE2=860%. Pt. uses an albuterol MDI at home PRN. Will order UDN txs Q6hrs PRN per protocol. No further respiratory intervention indicated at this time.

## 2023-08-12 NOTE — PROGRESS NOTES
Deterioration Index Critical Care Recommendations  Room #: 252  Deterioration index score: 67.46%    Critical Care recommends   Transfer to critical care service  Intubation for airway protection  Further work up of encephalopathy to include MRI and CvEEG to rule out NCS    SLIM notified via TT. Brief summary:   Critical care was brought to the patient's bedside via deterioration index alert. The alert was concerning for GCS of 5. Pt evaluated at the bedside and transferred to critical care. Please see accept note from critical care for further details    Please contact critical care via [phone] with any questions or concerns.

## 2023-08-12 NOTE — CONSULTS
NEUROLOGY RESIDENCY CONSULT NOTE     Name: Félix Chong   Age & Sex: 41 y.o. male   MRN: [id number]  Unit/Bed#: ICU 12   Encounter: [id number]  Length of Stay: 1    [id number] [id number] [facility]   26-year-old male with a history of chest testicular cancer, hypertension, bipolar, depression, CHF neurology consulted for encephalopathy and increased somnolence work-up thus far to see if MRI revealing nonspecific stable enhancement along the anterior floor the third ventricle/hypothalamus, CSF studies WBC 9, protein 46, glucose 59, negative ME panel, Gram stain negative, pending paraneoplastic panel and Lyme. Somnolence  Assessment & Plan  41 y.o. male who has past medical history significant for morbid obesity, testicular cancer (seminoma) with status postchemotherapy, hypertension, bipolar depression, recent admission to [facility] for rehabilitation secondary to fall. He's coming to the ED for worsening somnolence that has been going on for the past several days. Apparently per chart review, patient has had bouts of depression and ashley consistent with his bipolar and has overall slept throughout the day the past several days as well as decreased his oral intake. When he was evaluated in the ED by [facility], he was somnolent but responded to pain in his lower extremities and there was some functional testing that was done including the drop arm on his face and he avoided hurting his face as well as resisting examiner when they were trying to assess V1 strength and patient resisted examiner from letting them open his eyes. Patient was sent to the floors and when patient was examined, patient was noted to be quite somnolent and ICU was called to evaluate and had a GCS of 5 and patient was intubated for airway protection. Neurology was called to evaluate for possible seizure/because of somnolence, patient was already intubated but did not get any benzodiazepines.   Patient was agitated and was able to follow commands was cross midline, raising eyebrows, blinking appropriately to command, squeezing hands bilaterally, giving thumbs up and showing 2 fingers, bending bilateral legs appropriately and wiggling toes and patient was able to point to legs that were getting sensory testing. W/U:  CT CAP: Limited inspiratory volume. Interval improvement in bibasilar atelectatic changes. Hepatic steatosis. Stable borderline left para-aortic adenopathy. CTH: No acute intracranial abnormality. Stable small bilateral subcortical hypoattenuating foci. No associated mass effect. MRI brain w/ and w/o contrast: stable nonspecific enhancement along the anterior floor of the 3rd Ventricle/Hypothalamus when comparing to March 15, 2023. Differential includes chronic toxic metabolic insult (thiamine deficiency), chronic inflammatory/ infectious  conditions. Other considerations include Langerhans cell cell histiocytosis, lymphocytic hypophysitis, and neurosyphilis. Primary CNS lymphoma is a less likely consideration, particularly given the lack of interval change. Serologic correlation is  recommended. UA bland   TSH 2.421 WNL  Procal: 0.11 WNL  Lactic Acid: 1.1 WNL  Ammonia: 21  Ehtanol <3  WBC: 9.05  VB.354/58.3/55.8/31.8    CSF studies  RBC: 2500  WBC: 9  Glucose: 59  Protein: 46  ME panel negative   Negative gram stain CSF    Impression: Unclear etiology of somnolence could possibly be 2/2 to psychiatriac or functional neurologic disorder   Somnolence that could possibly due to a psychiatric/functional neurologic disorder versus possible seizure and patient could have been in a postictal state that was causing patient started somnolence; possibly lower on the differential for noted seizures/status of LOC as given patient had positive signs on functional neurologic testing as well as is able to follow commands appropriately. Noted to have bilateral vestibular schwannomas on prior MRI imaging.       Plan:   - Recommended video EEG to help rule out any possible seizure/postictal state  - Thiamine levels and empiric replenishment  - Adding ENS 2 to CSF studies to r/o paraneoplastic   -Would hold off on any AEDs or benzodiazepines at this time given patient is able to appropriately follow commands and will evaluate EEG findings  - Rest of care per primary  - Agree with having psychiatry evaluate patient for possible catatonia/functional neurologic disorder        Recommendations for outpatient neurological follow up have yet to be determined. SUBJECTIVE     Reason for Consult / Principal Problem: somnolence/AMS/ r/o seizure   Hx and PE limited by: intubated     HPI: Gianna Croft is a 41 y.o.  male w/  past medical history significant for morbid obesity, testicular cancer (seminoma) with status postchemotherapy, hypertension, bipolar depression, recent admission to [facility] for rehabilitation secondary to fall. He's coming here from rehab for noted worsening somnolence and altered mental status. He's sleeping a lot and minmally eating per chart review and has been experiencing bipolar (ashley and depression) More recently over the past few weeks he's been having cognitive changes and bizarre behavior and losing his balance more. He was not following commands when evaluated by the ED but resisted his eyes being open when eyelid strength was assessed. Apparently, in the ED the primary team attempted some functional testing and lifted his arm above head and dropped it and patient avoided hitting his head. Since coming to the floors, he has remained somnolent and had a GCS of 5 requiring intubation.         Neurology  was asked to assess for possible seizures.  When evaluated, patient was intubated but able to follow commands such as crossing eyes midline, raising eyebrows, squeezing hands, giving a thumbs up and moving his LE to commands.           Inpatient consult to Neurology     Performed by  Arnold Pinedo DO   Authorized by Lacey Leal MD             Historical Information   Past Medical History:   Diagnosis Date   • Anxiety    • Cancer [facility])    • Depression    • Psychiatric disorder     bipolar     Past Surgical History:   Procedure Laterality Date   • IR BIOPSY LYMPH NODE  1/20/2023   • IR PORT PLACEMENT  3/14/2023   • ORCHIECTOMY Left 12/14/2022    Procedure: ORCHIECTOMY INGUINAL;  Surgeon: Kyra Richard MD;  Location: [facility];  Service: Urology     Social History   Social History     Substance and Sexual Activity   Alcohol Use Not Currently    Comment: 2 cases of beer daily, stopped 3 years ago     Social History     Substance and Sexual Activity   Drug Use Yes   • Types: Marijuana    Comment: Medical marijuana     E-Cigarette/Vaping   • E-Cigarette Use Never User      E-Cigarette/Vaping Substances     Social History     Tobacco Use   Smoking Status Not on file   Smokeless Tobacco Never     Family History:   Family History   Problem Relation Age of Onset   • Coronary artery disease Maternal Aunt      Meds/Allergies   all current active meds have been reviewed, current meds:   Current Facility-Administered Medications   Medication Dose Route Frequency   • chlorhexidine (PERIDEX) 0.12 % oral rinse 15 mL  15 mL Mouth/Throat Q12H [address]   • enoxaparin (LOVENOX) subcutaneous injection 40 mg  40 mg Subcutaneous Q12H [address]   • Famotidine (PF) (PEPCID) injection 20 mg  20 mg Intravenous BID   • fentanyl citrate (PF) 100 MCG/2ML 50 mcg  50 mcg Intravenous Q1H PRN   • FLUoxetine (PROzac) capsule 10 mg  10 mg Oral Daily   • furosemide (LASIX) injection 20 mg  20 mg Intravenous Once   • multi-electrolyte (PLASMALYTE-A/ISOLYTE-S PH 7.4) IV solution  125 mL/hr Intravenous Continuous   • ondansetron (ZOFRAN) injection 4 mg  4 mg Intravenous Q6H PRN   • potassium chloride 20 mEq IVPB (premix)  20 mEq Intravenous Q2H   • propofol (DIPRIVAN) 1000 mg in 100 mL infusion (premix)  5-50 mcg/kg/min Intravenous Titrated • senna (SENOKOT) tablet 8.6 mg  1 tablet Oral HS   • thiamine (VITAMIN B1) 500 mg in sodium chloride 0.9 % 50 mL IVPB  500 mg Intravenous TID    and PTA meds:   Prior to Admission Medications   Prescriptions Last Dose Informant Patient Reported? Taking? Enoxaparin Sodium (LOVENOX SC) Unknown  Yes No   Sig: Inject 40 mg under the skin every 12 (twelve) hours   FLUoxetine (PROzac) 10 mg capsule Unknown  Yes No   Sig: Take 10 mg by mouth daily   QUEtiapine (SEROquel) 25 mg tablet Unknown  Yes No   Sig: Take 25 mg by mouth daily at bedtime   albuterol (PROVENTIL HFA,VENTOLIN HFA) 90 mcg/act inhaler Unknown  Yes No   Sig: Inhale 2 puffs every 4 (four) hours as needed for wheezing   albuterol (ProAir HFA) 90 mcg/act inhaler Unknown  No No   Sig: Inhale 2 puffs every 6 (six) hours as needed for wheezing   potassium chloride (K-DUR,KLOR-CON) 20 mEq tablet Unknown  No No   Sig: Take 2 tablets (40 mEq total) by mouth daily Do not start before August 6, 2023. Patient taking differently: Take 40 mEq by mouth 2 (two) times a day before breakfast and lunch   senna-docusate sodium (SENOKOT S) 8.6-50 mg per tablet Unknown  No No   Sig: Take 1 tablet by mouth daily at bedtime   torsemide (DEMADEX) 20 mg tablet Unknown  No No   Sig: Take 1 tablet (20 mg total) by mouth daily      Facility-Administered Medications: None     No Known Allergies    Review of previous medical records was  completed. Review of Systems   Unable to perform ROS: Acuity of condition          OBJECTIVE     Patient ID: Meli Bartlett is a 41 y.o. male. Vitals:   Vitals:    08/12/23 1000 08/12/23 1100 08/12/23 1115 08/12/23 1200   BP: 100/73 117/74 123/69 113/66   BP Location: Right arm      Pulse: 60 64 76 62   Resp: 17 17 17 17   Temp:    98 °F (36.7 °C)   TempSrc:    Oral   SpO2: 100% 100% 95% 100%      There is no height or weight on file to calculate BMI.      Intake/Output Summary (Last 24 hours) at 8/12/2023 1309  Last data filed at 2023 1200  Gross per 24 hour   Intake 1707.06 ml   Output 1250 ml   Net 457.06 ml       Temperature:   Temp (24hrs), Av.4 °F (36.3 °C), Min:96.1 °F (35.6 °C), Max:98.4 °F (36.9 °C)    Temperature: 98 °F (36.7 °C)    Invasive Devices: Invasive Devices     Central Venous Catheter Line  Duration           Port A Cath 23 Right Internal jugular 150 days          Peripheral Intravenous Line  Duration           Peripheral IV 23 Left Antecubital <1 day    Peripheral IV 23 Left;Dorsal (posterior) Hand <1 day          Drain  Duration           Urethral Catheter <1 day          Airway  Duration           ETT  Cuffed; Hi-Lo 8 mm <1 day                Physical Exam  Vitals reviewed. Constitutional:       Appearance: He is obese. He is ill-appearing and toxic-appearing. Pulmonary:      Comments: Intubated on mechanical ventilation  Musculoskeletal:         General: Swelling present. Neurological:      Mental Status: He is lethargic. Neurologic Exam   Mental status: Intubated, arousable to noxious stimuli, following some commands, slow to respond/sluggish  Cranial nerves: PERRL, extraocular motions intact, no nystagmus appreciated, cough and gag  Motor: Moving all 4 extremities purposefully  DTR: 1+ throughout        LABORATORY DATA     Labs: I have personally reviewed pertinent reports.    and I have personally reviewed pertinent films in PACS  Results from last 7 days   Lab Units 23  0534 23  1656   WBC Thousand/uL 5.97 9.05   HEMOGLOBIN g/dL 11.7* 12.7   HEMATOCRIT % 34.2* 37.9   PLATELETS Thousands/uL 253 210   NEUTROS PCT % 72 72   MONOS PCT % 9 9   EOS PCT % 1 1      Results from last 7 days   Lab Units 23  0534 23  1656   POTASSIUM mmol/L 3.2* 3.6   CHLORIDE mmol/L 96 90*   CO2 mmol/L 31 32   BUN mg/dL 6 8   CREATININE mg/dL 0.67 0.84   CALCIUM mg/dL 8.6 9.3   ALK PHOS U/L 102 133*   ALT U/L 142* 172*   AST U/L 64* 77*     Results from last 7 days   Lab Units 08/12/23  0534   MAGNESIUM mg/dL 2.1     Results from last 7 days   Lab Units 08/12/23  0534   PHOSPHORUS mg/dL 3.6          Results from last 7 days   Lab Units 08/11/23  1656   LACTIC ACID mmol/L 1.1           IMAGING & DIAGNOSTIC TESTING     Radiology Results: I have personally reviewed pertinent reports. and I have personally reviewed pertinent films in PACS  MRI brain w wo contrast   Final Result by Carl Vale MD (08/12 0935)      No acute intracranial abnormality. Stable nonspecific enhancement along the anterior floor of the third ventricle/hypothalamus when comparing to the March 15, 2023 study. Differential includes chronic toxic metabolic insult (thiamine deficiency), chronic inflammatory/ infectious    conditions. Other considerations include Langerhans cell cell histiocytosis, lymphocytic hypophysitis, and neurosyphilis. Primary CNS lymphoma is a less likely consideration, particularly given the lack of interval change. Serologic correlation is    recommended. Stable cerebral white matter signal abnormality, presumably the sequela of chronic microangiopathic disease given the patient's history. The study was marked in EPIC for significant notification. Workstation performed: [id number]         XR chest portable ICU   Final Result by Lee Freeman MD (08/12 1020)      ET tube 7 cm above the prashant. Persistent bibasilar atelectasis. Workstation performed: [id number]         CT head without contrast   Final Result by Hannah Henning MD (08/11 2026)      No acute intracranial abnormality. Stable small bilateral subcortical hypoattenuating foci. No associated mass effect. Workstation performed: [id number]         CT chest abdomen pelvis w contrast   Final Result by Hannah Henning MD (08/11 2037)      Limited inspiratory volume. Interval improvement in bibasilar atelectatic changes. Hepatic steatosis. Stable borderline left para-aortic adenopathy. Workstation performed: [id number]             Other Diagnostic Testing: I have personally reviewed pertinent reports. and I have personally reviewed pertinent films in PACS    ACTIVE MEDICATIONS     Current Facility-Administered Medications   Medication Dose Route Frequency   • chlorhexidine (PERIDEX) 0.12 % oral rinse 15 mL  15 mL Mouth/Throat Q12H [facility]   • enoxaparin (LOVENOX) subcutaneous injection 40 mg  40 mg Subcutaneous Q12H [facility]   • Famotidine (PF) (PEPCID) injection 20 mg  20 mg Intravenous BID   • fentanyl citrate (PF) 100 MCG/2ML 50 mcg  50 mcg Intravenous Q1H PRN   • FLUoxetine (PROzac) capsule 10 mg  10 mg Oral Daily   • furosemide (LASIX) injection 20 mg  20 mg Intravenous Once   • multi-electrolyte (PLASMALYTE-A/ISOLYTE-S PH 7.4) IV solution  125 mL/hr Intravenous Continuous   • ondansetron (ZOFRAN) injection 4 mg  4 mg Intravenous Q6H PRN   • potassium chloride 20 mEq IVPB (premix)  20 mEq Intravenous Q2H   • propofol (DIPRIVAN) 1000 mg in 100 mL infusion (premix)  5-50 mcg/kg/min Intravenous Titrated   • senna (SENOKOT) tablet 8.6 mg  1 tablet Oral HS   • thiamine (VITAMIN B1) 500 mg in sodium chloride 0.9 % 50 mL IVPB  500 mg Intravenous TID       Prior to Admission medications    Medication Sig Start Date End Date Taking?  Authorizing Provider   albuterol (ProAir HFA) 90 mcg/act inhaler Inhale 2 puffs every 6 (six) hours as needed for wheezing 12/14/22   Reese Herring DO   albuterol (PROVENTIL HFA,VENTOLIN HFA) 90 mcg/act inhaler Inhale 2 puffs every 4 (four) hours as needed for wheezing    Historical Provider, MD   Enoxaparin Sodium (LOVENOX SC) Inject 40 mg under the skin every 12 (twelve) hours    Historical Provider, MD   FLUoxetine (PROzac) 10 mg capsule Take 10 mg by mouth daily    Historical Provider, MD   potassium chloride (K-DUR,KLOR-CON) 20 mEq tablet Take 2 tablets (40 mEq total) by mouth daily Do not start before August 6, 2023.   Patient taking differently: Take 40 mEq by mouth 2 (two) times a day before breakfast and lunch 8/6/23   Rob Shields MD   QUEtiapine (SEROquel) 25 mg tablet Take 25 mg by mouth daily at bedtime    Historical Provider, MD   senna-docusate sodium (SENOKOT S) 8.6-50 mg per tablet Take 1 tablet by mouth daily at bedtime 8/5/23   Rob Shields MD   torsemide [facility]) 20 mg tablet Take 1 tablet (20 mg total) by mouth daily 5/17/23   Russell Grossman MD         CODE STATUS & ADVANCED DIRECTIVES     Code Status: Level 1 - Full Code  Advance Directive and Living Will:      Power of :    POLST:      VTE Pharmacologic Prophylaxis: as per primary   VTE Mechanical Prophylaxis: sequential compression device    ==  DO Ruben Mercedes Neurology Residency, PGY-2

## 2023-08-12 NOTE — RESPIRATORY THERAPY NOTE
Resp Care Note       08/12/23 1105   Respiratory Assessment   Resp Comments Pt, without teeth, was biting down on the unprotected ET tube, causing ventilator breaths to pressure cycle. With RN assistance, commercial tube larson changed to one which incorporated a bite block. ET tube placement remained 27 at the lips before and after the equipment change.   Pt remains on Assist Control mode now pending further instructions to reattempt any SPONT CPAP breaths given two failed weaning attempts earlier this AM.

## 2023-08-12 NOTE — H&P
[address] [facility]  H&P  Name: David Croft 41 y.o. male I MRN: [id number]  Unit/Bed#: ED 16 I Date of Admission: 8/11/2023   Date of Service: 8/11/2023 I Hospital Day: 0      Assessment/Plan   Somnolence  Assessment & Plan  · Patient presents with new onset somnolence from this morning. · Yesterday, according to patient's mother, she was able to speak to him via video chat. · According to girlfriend Anatoliyshyamkhloe Hinojosa over the phone, he has been exhibiting bizarre behavior over the past week. He has a history over the past year since the diagnosis of testicular cancer of cyclical demonstration of bizarre behavior. He has been asking questions that are bizarre such as "are you working in [employer]?"  referring to someone who has not been working there for quite some time. Or that he has been repeating comments and observations that he has just mentioned a few minutes ago. · At this point, the patient seems to have involuntary eyelid closure and when tried to manually open eyelids, he would forcefully close them. He also avoids hitting his head when arm is placed above and let go. He also withdraws to pain. Patient does not demonstrate any focal signs. · We will get neurology consult for an opinion. MRI tomorrow. · ER has attempted lumbar In order to do cerebrospinal fluid studies. · Rule out any urinary tract infections. · Urine drug screen. · B12 and folate. · TSH. Obesity hypoventilation syndrome [facility])  Assessment & Plan  Patient is on BiPAP at nights. We will continue. As per recent notes from  Primary care in nursing facility:  · Patient denies snoring, PND. · VBG results: Respiratory acidosis with metabolic compensation. Normal pH.    · Suspect could be due to obesity hypoventilation syndrome: BMI 42.78 or undiagnosed CROW   · Patient weaned off BiPAP on 7/29   · Currently On Room air   · Ordered BiPAP at bedtime while inpatient- however, refusing       Seminoma of left testis [facility])  Assessment & Plan  Currently being followed by hematology oncology as outpatient. Hypertension  Assessment & Plan  Patient was on Demadex 20 mg daily. We will continue to monitor weights and blood pressure. Input output. Cerebral gliosis  Assessment & Plan  CT scan of the head currently without acute findings. However has "Stable small bilateral subcortical hypoattenuating foci. No associated mass effect."  Previous MRI had chronic microangiopathy. Neurology consult regarding change of mental status. Bipolar I disorder [facility])  Assessment & Plan  Patient on Prozac and Seroquel; due to patient's "change in mental status", would put it on hold. Needs psychiatry consult regarding change in mental status. Noted patient has bizarre behavior over the past week. (See history)    Fall  Assessment & Plan  Patient has history of fall; currently on rehab. We will also get physical and Occupational Therapy while here in hospital.  Because of mental status change and bizarre behavior; cognitive Occupational Therapy. Case management. Chronic diastolic heart failure [facility])  Assessment & Plan  Wt Readings from Last 3 Encounters:   08/05/23 (!) 154 kg (339 lb 8.1 oz)   07/19/23 (!) 140 kg (309 lb 9.6 oz)   06/19/23 (!) 137 kg (302 lb)       Echocardiogram of June showed ejection fraction of 65%; mildly elevated right ventricular systolic pressure. Currently patient is not exhibiting shortness of breath. As patient is n.p.o.; will place patient on IV fluids. Monitor daily weights and input output. VTE Prophylaxis: Enoxaparin (Lovenox)  / sequential compression device   Code Status: Level 1 - Full Code as per papers and as discussed with family  POLST: There is no POLST form on file for this patient (pre-hospital)    Anticipated Length of Stay:  Patient will be admitted on an Inpatient basis with an anticipated length of stay of greater than 2 midnights.    Justification for U[facility]ASHI MARCUS - [facility]ALPES[facility] Stay: Please see detailed plans noted above. Would need fluoroscopy evaluation and further work-up of somnolence and change in behavior. Chief Complaint:     Somnolence and change in mental status. History of Present Illness:  Carlo Boo is a 41 y.o. male who has past medical history significant for morbid obesity, testicular cancer (seminoma) with status postchemotherapy, heart failure with preserved ejection fraction 65% with elevated right ventricular pressure, hypertension, recent fall, bipolar depression (although girlfriend states that he only has depression), recent admission to [facility] for rehabilitation secondary to fall. The patient comes to the emergency room with concerns of change in mental status being sleeping all the time and not even taking time to take his meals. According to mother who was able to see the patient yesterday by video chat, he was talking and able to converse and actually even ate his meals. However according to the girlfriend who was with the patient visiting in rehab, he has been sleeping all the time and not even touching his meals. (Tonight, the patient's mother and the patient's girlfriend were in bit of disagreement regarding this point.)  However ever since the diagnosis of seminoma and status post orchiectomy a year and a half ago, patient has been demonstrating cyclical depression and happy behavior. But over the past week, he has been demonstrating instances of bizarre behavior including asking someone whether he worked in [employer] when it was already known that that person has not been working there for quite some time. Also patient would occasionally mention situational observations  (girlfriend historian did not elaborate much) however would mention that minutes later. He also has a tendency to repeat conversations.   A week ago, the patient was placed in [facility] rehabilitation because of a fall and that he has a tendency to be losing balance and more weak at the right side. He is noncompliant with medications. (Noted some of his medications included psychiatric meds like Seroquel and Zoloft)    Girlfriend states that when nursing facility was asked, they would say that he is just doing fine. However because of the persistent somnolence he was then sent to [address] [place] [address] for further evaluation. (However again note that mother observed him to be eating and talking yesterday via video chat.)    Currently, patient is sleeping on bed. With a facemask, his saturations are 100. He appears comfortable. Patient also has both eyes closed. He is not reactive or conversant to examiner. Patient does not wince when abdomen is palpated and examined. He would withdraw lower extremity to pain. Also when eyelids are attempted to be opened manually, he would actively try to close them. Review of Systems:   Review of systems is very limited due to patient's uncooperative status.   Constitutional:  Denies fever or chills   Eyes:  Denies change in visual acuity   HENT:  Denies nasal congestion or sore throat   Respiratory:  Denies cough or shortness of breath   Cardiovascular:  Denies chest pain or edema   GI:  Denies abdominal pain, nausea, vomiting, bloody stools or diarrhea   :  Denies dysuria   Musculoskeletal:  Denies back pain or joint pain   Integument:  Denies rash   Neurologic:  Denies headache, focal weakness or sensory changes   Endocrine:  Denies polyuria or polydipsia   Psychiatric:  Denies depression or anxiety     Past Medical and Surgical History:   Past Medical History:   Diagnosis Date   • Anxiety    • Cancer ([address])    • Depression    • Psychiatric disorder     bipolar     Past Surgical History:   Procedure Laterality Date   • IR BIOPSY LYMPH NODE  1/20/2023   • IR PORT PLACEMENT  3/14/2023   • ORCHIECTOMY Left 12/14/2022    Procedure: ORCHIECTOMY INGUINAL;  Surgeon: Ambrocio Uribe MD;  Location: [facility]; Service: Urology       Meds/Allergies:  (Not in a hospital admission)      Allergies: No Known Allergies  History:  Marital Status: Single   Occupation: He is currently in nursing facility  Patient Pre-hospital Living Situation: [facility]  Patient Pre-hospital Level of Mobility: In rehab  Patient Pre-hospital Diet Restrictions: Currently regular and thin liquid  Substance Use History:   Social History     Substance and Sexual Activity   Alcohol Use Not Currently    Comment: 2 cases of beer daily, stopped 3 years ago     Social History     Tobacco Use   Smoking Status Not on file   Smokeless Tobacco Never     Social History     Substance and Sexual Activity   Drug Use Yes   • Types: Marijuana    Comment: Medical marijuana       Family History:  Family History   Problem Relation Age of Onset   • Coronary artery disease Maternal Aunt        Physical Exam:     Vitals:   Blood Pressure: 111/66 (08/11/23 1930)  Pulse: 72 (08/11/23 1930)  Temperature: (!) 96.8 °F (36 °C) (08/11/23 2023)  Temp Source: Rectal (08/11/23 2023)  Respirations: 18 (08/11/23 1930)  SpO2: 98 % (08/11/23 1930)    Constitutional:  Well developed, well nourished obese habitus, no acute distress, non-toxic appearance   Eyes:  PERRL, conjunctiva normal   HENT:  Atraumatic, external ears normal, nose normal, oropharynx moist, no pharyngeal exudates. Neck- normal range of motion, no tenderness, supple   Respiratory:  No respiratory distress, normal breath sounds, no rales, no wheezing   Cardiovascular:  Normal rate, normal rhythm, no murmurs, no gallops, no rubs   GI:  Soft, nondistended, normal bowel sounds, nontender, no organomegaly, no mass, no rebound, no guarding   :  No costovertebral angle tenderness   Musculoskeletal:  No edema, no tenderness, no deformities.  Back- no tenderness  Integument:  Well hydrated, no rash   Lymphatic:  No lymphadenopathy noted   Neurologic: Asleep, patient does not communicate and would not react with examiner spontaneously. However actively closes eyelids. When manually opened, he would actively try to close them. Does not demonstrate any facial droop. Patient does not have rigidity. Would withdraw to lower extremity to pain. Psychiatric:  Speech and behavior none    Lab Results: I have personally reviewed pertinent reports. Results from last 7 days   Lab Units 08/11/23  1656   WBC Thousand/uL 9.05   HEMOGLOBIN g/dL 12.7   HEMATOCRIT % 37.9   PLATELETS Thousands/uL 210   NEUTROS PCT % 72   LYMPHS PCT % 17   MONOS PCT % 9   EOS PCT % 1     Results from last 7 days   Lab Units 08/11/23  1656   POTASSIUM mmol/L 3.6   CHLORIDE mmol/L 90*   CO2 mmol/L 32   BUN mg/dL 8   CREATININE mg/dL 0.84   CALCIUM mg/dL 9.3   ALK PHOS U/L 133*   ALT U/L 172*   AST U/L 77*               Imaging:     CT chest abdomen pelvis w contrast    Result Date: 8/11/2023  Narrative: CT CHEST, ABDOMEN AND PELVIS WITH IV CONTRAST INDICATION:   Sepsis AMS, unresponsive, possible infection. COMPARISON: July 29, 2023 TECHNIQUE: CT examination of the chest, abdomen and pelvis was performed. Multiplanar 2D reformatted images were created from the source data. This examination, like all CT scans performed in the [facility], was performed utilizing techniques to minimize radiation dose exposure, including the use of iterative reconstruction and automated exposure control. Radiation dose length product (DLP) for this visit:  1853.67 mGy-cm IV Contrast:  100 mL of iohexol (OMNIPAQUE) Enteric Contrast: Enteric contrast was administered. FINDINGS: CHEST LUNGS: Dependent opacities in both lower lobes, mostly linear and subsegmental. Improved compared to the prior. Finding seen in association with diminished lung volumes suggesting findings more likely related to atelectasis. PLEURA:  Unremarkable. HEART/GREAT VESSELS: Heart is unremarkable for patient's age. No thoracic aortic aneurysm. MEDIASTINUM AND NIMA:  Unremarkable.  CHEST WALL AND LOWER NECK: Bilateral symmetric significant gynecomastia. ABDOMEN LIVER/BILIARY TREE: Severe hepatic steatosis. Small areas of sparing adjacent to the mary hepatis. GALLBLADDER:  No calcified gallstones. No pericholecystic inflammatory change. SPLEEN:  Unremarkable. PANCREAS:  Unremarkable. ADRENAL GLANDS:  Unremarkable. KIDNEYS/URETERS:  Unremarkable. No hydronephrosis. STOMACH AND BOWEL:  Unremarkable. APPENDIX: A normal appendix was visualized. ABDOMINOPELVIC CAVITY:  No ascites. No pneumoperitoneum. Stable retroperitoneal adenopathy. 2 borderline abnormal left para-aortic lymph nodes just caudal to the left renal artery. VESSELS:  Unremarkable for patient's age. PELVIS REPRODUCTIVE ORGANS:  Unremarkable for patient's age. URINARY BLADDER:  Unremarkable. ABDOMINAL WALL/INGUINAL REGIONS:  Unremarkable. OSSEOUS STRUCTURES:  No acute fracture or destructive osseous lesion. Impression: Limited inspiratory volume. Interval improvement in bibasilar atelectatic changes. Hepatic steatosis. Stable borderline left para-aortic adenopathy. Workstation performed: [id number]     CT head without contrast    Result Date: 8/11/2023  Narrative: CT BRAIN - WITHOUT CONTRAST INDICATION:   Mental status change, unknown cause AMS, unresponsive. COMPARISON: July 2023 TECHNIQUE:  CT examination of the brain was performed. Multiplanar 2D reformatted images were created from the source data. Radiation dose length product (DLP) for this visit:  882.8 mGy-cm . This examination, like all CT scans performed in the [facility], was performed utilizing techniques to minimize radiation dose exposure, including the use of iterative reconstruction and automated exposure control. IMAGE QUALITY:  Diagnostic. FINDINGS: PARENCHYMA:  No intracranial mass, mass effect or midline shift. No CT signs of acute infarction. No acute parenchymal hemorrhage.  Few subtle subcortical areas of hypoattenuation bilaterally, correlating with recent prior MRI and CTs. VENTRICLES AND EXTRA-AXIAL SPACES:  Normal for the patient's age. VISUALIZED ORBITS: Normal visualized orbits. PARANASAL SINUSES: Normal visualized paranasal sinuses. CALVARIUM AND EXTRACRANIAL SOFT TISSUES:  Normal.     Impression: No acute intracranial abnormality. Stable small bilateral subcortical hypoattenuating foci. No associated mass effect. Workstation performed: [id number]     XR chest 1 view    Result Date: 7/31/2023  Narrative: TRAUMA SERIES INDICATION:  TRAUMA. COMPARISON: No prior studies available for direct comparison at time of dictation. Comparison was made to a chest radiograph from 5/4/2023. VIEWS: AP chest Images: 2 FINDINGS: The lungs are hypoinflated, limiting evaluation. Likely atelectasis at the lung bases. No pleural effusions. No evidence of pneumothorax. Cardiac silhouette not accurately accessed on this projection. No obvious displaced fractures within limitation of supine AP chest technique. Impression: Hypoinflated lungs, limiting evaluation. Likely atelectasis at the lung bases. No evidence of pneumothorax. No obvious displaced fractures within limitation of supine AP chest technique. Workstation performed: [id number]     MRI brain w wo contrast    Result Date: 7/31/2023  Narrative: MRI BRAIN WITH AND WITHOUT CONTRAST INDICATION: AMS. Altered mental status. History of testicular cancer. COMPARISON:  None. TECHNIQUE: Multiplanar, multisequence imaging of the brain was performed before and after gadolinium administration. IV Contrast:  15 mL of Gadobutrol injection (SINGLE-DOSE) IMAGE QUALITY:   Diagnostic. FINDINGS: BRAIN PARENCHYMA:  There is no discrete mass, mass effect or midline shift. There is no intracranial hemorrhage. Normal posterior fossa. Diffusion imaging is unremarkable. No acute infarction.  Small scattered hyperintensities on T2/FLAIR imaging are noted in the periventricular and subcortical white matter demonstrating an appearance that is statistically most likely to represent mild microangiopathic change. Postcontrast imaging of the brain demonstrates no abnormal enhancement given mild motion artifact. VENTRICLES:  Normal for the patient's age. SELLA AND PITUITARY GLAND:  Normal. ORBITS:  Normal. PARANASAL SINUSES:  Normal. VASCULATURE:  Evaluation of the major intracranial vasculature demonstrates appropriate flow voids. CALVARIUM AND SKULL BASE:  Normal. EXTRACRANIAL SOFT TISSUES:  Normal.     Impression: No acute intracranial abnormality. No enhancing intracranial metastasis given mild motion artifact on postcontrast sequences Mild chronic microangiopathy. Workstation performed: [id number]     US bedside procedure    Result Date: 7/31/2023  Narrative: [id number]. [id number]    CT chest abdomen pelvis w contrast    Result Date: 7/29/2023  Narrative: CT CHEST, ABDOMEN AND PELVIS WITH IV CONTRAST INDICATION: . Fever, trauma yesterday, on anticoagulation therapy, testicular cancer COMPARISON: CT abdomen and pelvis May 3, 2023, February 2023 under a different medical record number. At this time the records have not been merged. TECHNIQUE: CT examination of the chest, abdomen and pelvis was performed. Multiplanar 2D reformatted images were created from the source data. This examination, like all CT scans performed in the [facility], was performed utilizing techniques to minimize radiation dose exposure, including the use of iterative reconstruction and automated exposure control. Radiation dose length product (DLP) for this visit:  2122 mGy-cm IV Contrast:  100 mL of iohexol (OMNIPAQUE) Enteric Contrast: Enteric contrast was not administered. FINDINGS: CHEST Right portacatheter is seen. LUNGS: Low lung volumes. Right more than left diaphragmatic elevation. Bilateral lower lobe areas of atelectasis versus infiltrates. Due to motion lung nodules could be obscured.  There is no tracheal or endobronchial lesion. PLEURA:  Unremarkable. HEART/GREAT VESSELS: Heart is unremarkable for patient's age. No thoracic aortic aneurysm. MEDIASTINUM AND NIMA:  Unremarkable. CHEST WALL AND LOWER NECK: Bilateral gynecomastia. ABDOMEN LIVER/BILIARY TREE: Severe hepatic steatosis. GALLBLADDER:  No calcified gallstones. No pericholecystic inflammatory change. SPLEEN:  Unremarkable. PANCREAS:  Unremarkable. ADRENAL GLANDS:  Unremarkable. KIDNEYS/URETERS:  Unremarkable. No hydronephrosis. STOMACH AND BOWEL:  Unremarkable. APPENDIX: A normal appendix was visualized. ABDOMINOPELVIC CAVITY:  No ascites. No pneumoperitoneum. 1.1 cm left para-aortic node is seen series 301 image 161. 8.5 mm node left para-aortic series 301 image 176. There were 2 left para-aortic nodes on the previous exam measuring approximately 1.2 cm in diameter. Patient is status post retroperitoneal lymph node biopsy which showed necrosis. VESSELS:  Unremarkable for patient's age. PELVIS REPRODUCTIVE ORGANS:  Unremarkable for patient's age. URINARY BLADDER: Urinary bladder contains a Sheppard catheter. ABDOMINAL WALL/INGUINAL REGIONS: Fat-containing umbilical hernia. Subcutaneous injections in the right abdominal wall. Fat-containing left inguinal hernia. OSSEOUS STRUCTURES:  No acute fracture or destructive osseous lesion. Impression: 1. Low lung volumes with bilateral lower lobe atelectasis versus infiltrates. Image degradation due to respiratory motion. 2. Severe hepatic steatosis. 3. Fat-containing umbilical hernia with no complications. 4. Retroperitoneal nodes, 1 stable and one smaller. Attention is needed on follow-up exam in view of patient's history of testicular neoplasm. The study was marked in [facility] for immediate notification. . Workstation performed: [id number]     XR Trauma multiple ([facility] trauma bay ONLY)    Result Date: 7/28/2023  Narrative: TRAUMA SERIES INDICATION:  TRAUMA.  COMPARISON: No prior studies available for direct comparison at time of dictation. Comparison was made to a chest radiograph from 5/4/2023. VIEWS: AP chest Images: 2 FINDINGS: The lungs are hypoinflated, limiting evaluation. Likely atelectasis at the lung bases. No pleural effusions. No evidence of pneumothorax. Cardiac silhouette not accurately accessed on this projection. No obvious displaced fractures within limitation of supine AP chest technique. Impression: Hypoinflated lungs, limiting evaluation. Likely atelectasis at the lung bases. No evidence of pneumothorax. No obvious displaced fractures within limitation of supine AP chest technique. Workstation performed: [id number]     TRAUMA - CT head wo contrast    Result Date: 7/28/2023  Narrative: CT BRAIN - WITHOUT CONTRAST INDICATION:   TRAUMA. As per review of electronic medical record, patient with history of left testicular seminoma status post fall 2 days ago with head strike and change in mental status. COMPARISON: No prior studies available for direct comparison at time of dictation under this medical record number. An MRI of the brain from 6/12/2023 and CT of the head from 5/4/2023 were reviewed under patient 2nd medical record number TECHNIQUE:  CT examination of the brain was performed. Multiplanar 2D reformatted images were created from the source data. Radiation dose length product (DLP) for this visit:  1140 mGy-cm . This examination, like all CT scans performed in the [facility], was performed utilizing techniques to minimize radiation dose exposure, including the use of iterative reconstruction and automated exposure control. IMAGE QUALITY:  Diagnostic. FINDINGS: PARENCHYMA AND EXTRA-AXIAL SPACES: Slight decreased attenuation is noted in periventricular and subcortical white matter, similar to the prior studies. No evidence of acute infarction. No mass effect or midline shift. No acute parenchymal hemorrhage. No extra-axial fluid collections.  VENTRICLES:  Normal for the patient's age. VISUALIZED ORBITS AND PARANASAL SINUSES:  Unremarkable visualized orbits. Clear paranasal sinuses. CALVARIUM AND EXTRACRANIAL SOFT TISSUES:  No calvarial fracture. Impression: No acute intracranial pathology. In particular, no intracranial hemorrhage or calvarial fracture. Slight decreased attenuation in the periventricular and subcortical white matter as on prior studies, better delineated on recent brain MRI. I personally discussed this study with Dr. Ofe Montano on 7/28/2023 at 7:02 PM. Workstation performed: [id number]         ** Please Note: Dragon 360 Dictation voice to text software was used in the creation of this document.  **

## 2023-08-12 NOTE — ASSESSMENT & PLAN NOTE
Wt Readings from Last 3 Encounters:   08/05/23 (!) 154 kg (339 lb 8.1 oz)   07/19/23 (!) 140 kg (309 lb 9.6 oz)   06/19/23 (!) 137 kg (302 lb)       · Holding home Torsemide  · Monitor I/O  · Daily weightes

## 2023-08-12 NOTE — RESPIRATORY THERAPY NOTE
RT Ventilator Management Note  Meli Breed 41 y.o. male MRN: [id number]  Unit/Bed#: ICU 12 Encounter: [id number]       08/12/23 1077   Respiratory Assessment   Assessment Type Assess only   General Appearance Drowsy   Respiratory Pattern Assisted   Chest Assessment Chest expansion asymmetrical   Bilateral Breath Sounds Clear   Cough Non-productive   Resp Comments Routine vent check this afternoon. Informed to hold from any furrther weaning attempts this day; likely to reinitiate tomorrow. Pt remaining on AC / VC of 17 x 450 x 60% and 6 PEEP. Some secretion management issues sporadically causing high pressure alarms to sound. Alarm limits readjusted and documented here. No change in BS's. Vent alarms on and tested. Will continue to monitor per policy.    O2 Device PB 7200 vent   Vent Information   Vent ID [id number]   Vent type     Vent Mode AC/VC   $ Pulse Oximetry Spot Check Charge Completed   AC/VC Settings   Resp Rate (BPM) 17 BPM   Vt (mL) 450 mL   FIO2 (%) 60 %   PEEP (cmH2O) 6 cmH2O   Flow Pattern (LPM) 60 L/min   Trigger Sensitivity Flow (lpm) 3 %   Humidification Heater   Heater Temperature (Set) 98.6 °F (37 °C)   AC/VC Actuals   Resp Rate (BPM) 17 BPM   VT (mL) 481   MV 8.19   MAP (cmH2O) 10 cmH2O   Peak Pressure (cmH2O) 20 cmH2O   I/E Ratio (Obs) 1:3.3   Heater Temperature (Obs) 98.6 °F (37 °C)   Static Compliance (mL/cmH20) 40 mL/cmH2O   Plateau Pressure (cm H2O) 19 cm H2O   AC/VC Alarms   High Peak Pressure (cmH2O) 40   High Resp Rate (BPM) 40 BPM   High MV (L/min) 25 L/min   Low MV (L/min) 5 L/min   Vt High (mL) 1200 mL   Vt Low (mL) 345 mL   AC/VC Apnea Settings   Resp Rate (BPM) 17 BPM   VT (mL) 550 mL   FIO2 (%) 100 %   Apnea Time (s) 20 S   Apnea Flow (L/min) 60 L/min   Maintenance   Alarm (pink) cable attached Yes   Resuscitation bag with peep valve at bedside Yes   Water bag changed No   Circuit changed No   Daily Screen   Patient safety screen outcome: Failed   Not Ready for Weaning due to: Underline problem not resolved   IHI Ventilator Associated Pneumonia Bundle   Head of Bed Elevated HOB 30   ETT  Cuffed; Hi-Lo 8 mm   Placement Date/Time: 08/12/23 (c) 2303   Type: Cuffed; Hi-Lo  Tube Size: 8 mm  Location: Oral  Insertion attempts: 1  Placement Verification: Chest x-ray;End tidal CO2;Symmetrical chest wall movement  Secured at (cm): 23CM   Secured at (cm) 26   Measured from [address] by [employer] tube larson   Site Condition Dry   Cuff Pressure (cm H2O)   (green)   HI-LO Suction  Intermittent suction   HI-LO Secretions Scant   HI-LO Intervention Patent         Daily Screen         8/12/2023  1115 8/12/2023  4942          Patient safety screen outcome[de-identified] Failed Failed      Not Ready for Weaning due to[de-identified] Underline problem not resolved Underline problem not resolved                Physical Exam:   Assessment Type: Assess only  General Appearance: Drowsy  Respiratory Pattern: Assisted  Chest Assessment: Chest expansion asymmetrical  Bilateral Breath Sounds: Clear  Cough: Non-productive  O2 Device: PB 7200 vent      Resp Comments: Routine vent check this afternoon. Informed to hold from any furrther weaning attempts this day; likely to reinitiate tomorrow. Pt remaining on AC / VC of 17 x 450 x 60% and 6 PEEP. Some secretion management issues sporadically causing high pressure alarms to sound. Alarm limits readjusted and documented here. No change in BS's. Vent alarms on and tested. Will continue to monitor per policy.

## 2023-08-12 NOTE — UTILIZATION REVIEW
NOTIFICATION OF INPATIENT ADMISSION   AUTHORIZATION REQUEST   SERVICING FACILITY:   [address] [place] [address]  Address: [address] [place] [address] [address] [address]  Tax ID: [id number]  NPI: [id number] ATTENDING PROVIDER:  Attending Name and NPI#: Bee Burciaga Md [[id number]]  Address: [address] [place] [address] [address] [address]  Phone: [phone]   ADMISSION INFORMATION:  Place of Service: Inpatient [address] [facility]  Place of Service Code: 21  Inpatient Admission Date/Time: 8/11/23  9:36 PM  Discharge Date/Time: No discharge date for patient encounter. Admitting Diagnosis Code/Description:  Somnolence [R40.0]  Catatonic reaction [F06.1]  Acute encephalopathy [G93.40]  AMS (altered mental status) [R41.82]     UTILIZATION REVIEW CONTACT:  Damari Joaquin Utilization   Network Utilization Review Department  Phone: [phone]  Fax: [phone]  Email: [email or web address]  Contact for approvals/pending authorizations, clinical reviews, and discharge. PHYSICIAN ADVISORY SERVICES:  Medical Necessity Denial & [facility]wn[facility] Review  Phone: [phone]  Fax: [phone]  Email: [email or web address]

## 2023-08-12 NOTE — ASSESSMENT & PLAN NOTE
CT scan of the head currently without acute findings. However has "Stable small bilateral subcortical hypoattenuating foci. No associated mass effect."  Previous MRI had chronic microangiopathy. Neurology consult regarding change of mental status.

## 2023-08-12 NOTE — PROCEDURES
Intubation    Date/Time: 8/12/2023 3:41 AM    Performed by: Rick Snyder PA-C  Authorized by: Rick Snyder PA-C    Patient location:  Bedside  Consent:     Consent obtained:  Emergent situation  Universal protocol:     Immediately prior to procedure, a time out was called: yes      Patient identity confirmed:  Arm band, provided demographic data and hospital-assigned identification number  Pre-procedure details:     Patient status:  Unresponsive    Pretreatment medications:  Etomidate    Paralytics:  Succinylcholine  Indications:     Indications for intubation: airway protection    Procedure details:     Preoxygenation:  Nonrebreather mask    CPR in progress: no      Intubation method:  Oral    Oral intubation technique:  Direct    Laryngoscope blade: Mac 3    Tube size (mm):  8.0    Tube type:  Cuffed and hi-lo    Number of attempts:  1    Tube visualized through cords: yes    Placement assessment:     ETT to lip:  23CM    Tube secured with:  ETT larson    Breath sounds:  Equal    Placement verification: chest rise, condensation, colorimetric ETCO2 device, CXR verification, direct visualization, equal breath sounds, ETCO2 detector and tube exhalation      CXR findings:  ETT in proper place    Ventilator settings: Will advance 2CM towards prashant   Post-procedure details:     Patient tolerance of procedure:   Tolerated well, no immediate complications        Rick Snyder PA-C

## 2023-08-12 NOTE — ASSESSMENT & PLAN NOTE
Patient was on Demadex 20 mg daily. We will continue to monitor weights and blood pressure. Input output.

## 2023-08-13 LAB
AFP-TM SERPL-MCNC: 2.54 NG/ML (ref 0–9)
ALBUMIN SERPL BCP-MCNC: 3 G/DL (ref 3.5–5)
ALP SERPL-CCNC: 112 U/L (ref 46–116)
ALT SERPL W P-5'-P-CCNC: 162 U/L (ref 12–78)
ANION GAP SERPL CALCULATED.3IONS-SCNC: 3 MMOL/L
ANION GAP SERPL CALCULATED.3IONS-SCNC: 5 MMOL/L
AST SERPL W P-5'-P-CCNC: 57 U/L (ref 5–45)
BASOPHILS # BLD AUTO: 0.03 THOUSANDS/ÂΜL (ref 0–0.1)
BASOPHILS NFR BLD AUTO: 0 % (ref 0–1)
BILIRUB SERPL-MCNC: 0.55 MG/DL (ref 0.2–1)
BUN SERPL-MCNC: 8 MG/DL (ref 5–25)
BUN SERPL-MCNC: 9 MG/DL (ref 5–25)
CALCIUM ALBUM COR SERPL-MCNC: 9.9 MG/DL (ref 8.3–10.1)
CALCIUM SERPL-MCNC: 9.1 MG/DL (ref 8.3–10.1)
CALCIUM SERPL-MCNC: 9.5 MG/DL (ref 8.3–10.1)
CHLORIDE SERPL-SCNC: 97 MMOL/L (ref 96–108)
CHLORIDE SERPL-SCNC: 97 MMOL/L (ref 96–108)
CO2 SERPL-SCNC: 32 MMOL/L (ref 21–32)
CO2 SERPL-SCNC: 32 MMOL/L (ref 21–32)
CREAT SERPL-MCNC: 1.03 MG/DL (ref 0.6–1.3)
CREAT SERPL-MCNC: 1.08 MG/DL (ref 0.6–1.3)
EOSINOPHIL # BLD AUTO: 0.04 THOUSAND/ÂΜL (ref 0–0.61)
EOSINOPHIL NFR BLD AUTO: 1 % (ref 0–6)
ERYTHROCYTE [DISTWIDTH] IN BLOOD BY AUTOMATED COUNT: 14.7 % (ref 11.6–15.1)
GFR SERPL CREATININE-BSD FRML MDRD: 87 ML/MIN/1.73SQ M
GFR SERPL CREATININE-BSD FRML MDRD: 93 ML/MIN/1.73SQ M
GLUCOSE SERPL-MCNC: 111 MG/DL (ref 65–140)
GLUCOSE SERPL-MCNC: 134 MG/DL (ref 65–140)
HCG SERPL QL: NEGATIVE
HCT VFR BLD AUTO: 34.2 % (ref 36.5–49.3)
HGB BLD-MCNC: 11.6 G/DL (ref 12–17)
IMM GRANULOCYTES # BLD AUTO: 0.02 THOUSAND/UL (ref 0–0.2)
IMM GRANULOCYTES NFR BLD AUTO: 0 % (ref 0–2)
LDH SERPL-CCNC: 296 U/L (ref 81–234)
LYMPHOCYTES # BLD AUTO: 1.36 THOUSANDS/ÂΜL (ref 0.6–4.47)
LYMPHOCYTES NFR BLD AUTO: 17 % (ref 14–44)
MCH RBC QN AUTO: 32.7 PG (ref 26.8–34.3)
MCHC RBC AUTO-ENTMCNC: 33.9 G/DL (ref 31.4–37.4)
MCV RBC AUTO: 96 FL (ref 82–98)
MONOCYTES # BLD AUTO: 0.9 THOUSAND/ÂΜL (ref 0.17–1.22)
MONOCYTES NFR BLD AUTO: 11 % (ref 4–12)
NEUTROPHILS # BLD AUTO: 5.57 THOUSANDS/ÂΜL (ref 1.85–7.62)
NEUTS SEG NFR BLD AUTO: 71 % (ref 43–75)
NRBC BLD AUTO-RTO: 0 /100 WBCS
PLATELET # BLD AUTO: 277 THOUSANDS/UL (ref 149–390)
PMV BLD AUTO: 9.3 FL (ref 8.9–12.7)
POTASSIUM SERPL-SCNC: 3.4 MMOL/L (ref 3.5–5.3)
POTASSIUM SERPL-SCNC: 4.4 MMOL/L (ref 3.5–5.3)
PROT SERPL-MCNC: 6.5 G/DL (ref 6.4–8.4)
RBC # BLD AUTO: 3.55 MILLION/UL (ref 3.88–5.62)
SODIUM SERPL-SCNC: 132 MMOL/L (ref 135–147)
SODIUM SERPL-SCNC: 134 MMOL/L (ref 135–147)
WBC # BLD AUTO: 7.92 THOUSAND/UL (ref 4.31–10.16)

## 2023-08-13 PROCEDURE — 99223 1ST HOSP IP/OBS HIGH 75: CPT | Performed by: INTERNAL MEDICINE

## 2023-08-13 PROCEDURE — 84703 CHORIONIC GONADOTROPIN ASSAY: CPT | Performed by: ANESTHESIOLOGY

## 2023-08-13 PROCEDURE — 94003 VENT MGMT INPAT SUBQ DAY: CPT

## 2023-08-13 PROCEDURE — 94760 N-INVAS EAR/PLS OXIMETRY 1: CPT

## 2023-08-13 PROCEDURE — 80053 COMPREHEN METABOLIC PANEL: CPT

## 2023-08-13 PROCEDURE — 94660 CPAP INITIATION&MGMT: CPT

## 2023-08-13 PROCEDURE — 99291 CRITICAL CARE FIRST HOUR: CPT | Performed by: ANESTHESIOLOGY

## 2023-08-13 PROCEDURE — 87081 CULTURE SCREEN ONLY: CPT | Performed by: ANESTHESIOLOGY

## 2023-08-13 PROCEDURE — NC001 PR NO CHARGE: Performed by: ANESTHESIOLOGY

## 2023-08-13 PROCEDURE — 92610 EVALUATE SWALLOWING FUNCTION: CPT

## 2023-08-13 PROCEDURE — 83615 LACTATE (LD) (LDH) ENZYME: CPT | Performed by: ANESTHESIOLOGY

## 2023-08-13 PROCEDURE — 85025 COMPLETE CBC W/AUTO DIFF WBC: CPT

## 2023-08-13 PROCEDURE — 80048 BASIC METABOLIC PNL TOTAL CA: CPT | Performed by: ANESTHESIOLOGY

## 2023-08-13 PROCEDURE — 99232 SBSQ HOSP IP/OBS MODERATE 35: CPT | Performed by: PSYCHIATRY & NEUROLOGY

## 2023-08-13 PROCEDURE — 95720 EEG PHY/QHP EA INCR W/VEEG: CPT | Performed by: PSYCHIATRY & NEUROLOGY

## 2023-08-13 PROCEDURE — 82105 ALPHA-FETOPROTEIN SERUM: CPT | Performed by: ANESTHESIOLOGY

## 2023-08-13 PROCEDURE — 83519 RIA NONANTIBODY: CPT | Performed by: PSYCHIATRY & NEUROLOGY

## 2023-08-13 RX ORDER — FUROSEMIDE 10 MG/ML
20 INJECTION INTRAMUSCULAR; INTRAVENOUS
Status: DISCONTINUED | OUTPATIENT
Start: 2023-08-13 | End: 2023-08-13

## 2023-08-13 RX ORDER — FUROSEMIDE 10 MG/ML
40 INJECTION INTRAMUSCULAR; INTRAVENOUS ONCE
Status: COMPLETED | OUTPATIENT
Start: 2023-08-13 | End: 2023-08-13

## 2023-08-13 RX ORDER — POTASSIUM CHLORIDE 20MEQ/15ML
40 LIQUID (ML) ORAL 2 TIMES DAILY
Status: COMPLETED | OUTPATIENT
Start: 2023-08-13 | End: 2023-08-14

## 2023-08-13 RX ORDER — FUROSEMIDE 10 MG/ML
20 INJECTION INTRAMUSCULAR; INTRAVENOUS
Status: DISCONTINUED | OUTPATIENT
Start: 2023-08-14 | End: 2023-08-16

## 2023-08-13 RX ORDER — POLYETHYLENE GLYCOL 3350 17 G/17G
17 POWDER, FOR SOLUTION ORAL DAILY PRN
Status: DISCONTINUED | OUTPATIENT
Start: 2023-08-13 | End: 2023-08-14

## 2023-08-13 RX ORDER — ACETAMINOPHEN 160 MG/5ML
650 SUSPENSION ORAL EVERY 6 HOURS PRN
Status: DISCONTINUED | OUTPATIENT
Start: 2023-08-13 | End: 2023-08-13

## 2023-08-13 RX ORDER — MODAFINIL 100 MG/1
100 TABLET ORAL DAILY
Status: DISCONTINUED | OUTPATIENT
Start: 2023-08-13 | End: 2023-08-15

## 2023-08-13 RX ADMIN — SENNOSIDES 8.6 MG: 8.6 TABLET, FILM COATED ORAL at 21:34

## 2023-08-13 RX ADMIN — THIAMINE HYDROCHLORIDE 500 MG: 100 INJECTION, SOLUTION INTRAMUSCULAR; INTRAVENOUS at 08:42

## 2023-08-13 RX ADMIN — POTASSIUM CHLORIDE 40 MEQ: 1.5 SOLUTION ORAL at 08:59

## 2023-08-13 RX ADMIN — CHLORHEXIDINE GLUCONATE 15 ML: 1.2 SOLUTION ORAL at 20:30

## 2023-08-13 RX ADMIN — FENTANYL CITRATE 50 MCG: 50 INJECTION INTRAMUSCULAR; INTRAVENOUS at 03:03

## 2023-08-13 RX ADMIN — POTASSIUM CHLORIDE 40 MEQ: 1.5 SOLUTION ORAL at 14:15

## 2023-08-13 RX ADMIN — ENOXAPARIN SODIUM 40 MG: 40 INJECTION SUBCUTANEOUS at 08:38

## 2023-08-13 RX ADMIN — ENOXAPARIN SODIUM 40 MG: 40 INJECTION SUBCUTANEOUS at 20:30

## 2023-08-13 RX ADMIN — MODAFINIL 100 MG: 100 TABLET ORAL at 14:15

## 2023-08-13 RX ADMIN — CHLORHEXIDINE GLUCONATE 15 ML: 1.2 SOLUTION ORAL at 08:43

## 2023-08-13 RX ADMIN — THIAMINE HYDROCHLORIDE 500 MG: 100 INJECTION, SOLUTION INTRAMUSCULAR; INTRAVENOUS at 21:34

## 2023-08-13 RX ADMIN — FUROSEMIDE 40 MG: 10 INJECTION, SOLUTION INTRAMUSCULAR; INTRAVENOUS at 08:21

## 2023-08-13 RX ADMIN — FAMOTIDINE 20 MG: 10 INJECTION INTRAVENOUS at 18:42

## 2023-08-13 RX ADMIN — FAMOTIDINE 20 MG: 10 INJECTION INTRAVENOUS at 08:26

## 2023-08-13 RX ADMIN — ACETAMINOPHEN 650 MG: 650 SUSPENSION ORAL at 06:03

## 2023-08-13 RX ADMIN — THIAMINE HYDROCHLORIDE 500 MG: 100 INJECTION, SOLUTION INTRAMUSCULAR; INTRAVENOUS at 16:30

## 2023-08-13 RX ADMIN — FLUOXETINE 10 MG: 10 CAPSULE ORAL at 08:30

## 2023-08-13 NOTE — ASSESSMENT & PLAN NOTE
Currently smokes 1/2 pack per day.     Plan:  · Encourage smoking cessation  · Consider nicotine patch as needed

## 2023-08-13 NOTE — PROGRESS NOTES
[address] [place] [address]  Progress Note: Critical Care  Name: Fidelina Last 41 y.o. male I MRN: [id number]  Unit/Bed#: ICU 15 I Date of Admission: 8/11/2023   Date of Service: 8/13/2023 I Hospital Day: 2    Assessment/Plan   Neuro/Psych:  Diagnoses: Encephalopathy; Left vestibular schwannoma; Anxiety/Depression  Plan:  · Q1H neuro checks  • Discontinue vEEG  • F/u CSF studies  • Prozac 10 mg daily  • Analgesia: PRN Tylenol. PRN Fentanyl    CV:  Diagnoses: Heart failure preserved ejection fraction; Hypertension  MAP Trend: 79-97  Plan: Maintain MAP > 65. SBP goal < 160. Pulm:  Diagnoses: Acute respiratory failure; Obesity hypoventilation syndrome  SpO2 Trend: 96-98%  Imaging: CXR 8/12: Persistent bibasilar atelectasis  Blood gases: ABG (8/12) pH 7.4 pCO2 45.9 pO2 100.2 HCO3 32.9  Vent: AC/VC R17/450/50%/PEEP6 (previously on 60%)  Plan:  • Frequent SBTs. Wean FiO2 and PEEP as tolerated. • Continuous pulse oximetry. • Order ABG    GI:  Diagnoses: No active issues  Last BM: None documented  Plan:  · Tube feeds  · GI prophylaxis: IV pepcid 20 mg BID  · Bowel regimen: Senna 1 tablet QHS. Added miralax daily PRN. :  Diagnoses: No active issues  I/Os: (8/12) Intake 3 L, output 1.75 L, net positive 1.3 L  Plan:   • Goal negative 500 - 1L  • PRN diuretics. Ordered lasix 40 mg IV this AM.    F/E/N:  Diagnoses: Hypokalemia  Plan:   • F: Fluid resuscitation prn. • E: Ordered potasium chloride oral solution 40 mg x 2 doses. Monitor and replete electrolytes for Mg >2, Phos >3, K >4.  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  Pressure injury prevention. Monitor for skin breakdown. PT/OT when appropriate. Encourage OOB and ambulation when appropriate. Local wound care prn. Tox:  Diagnoses: History of meth and EtOH abuse (sober x 3 years)  Plan: Thiamine 500 mg TID    LDA:  • Lines - RIJ Port A Cath  • Drains - Urethral Catheter  • Airways - ETT Cuffed; Hi-Lo 8 mm    Disposition: Critical care    ICU Core Measures     Vented Patient  VAP Bundle  VAP bundle ordered     A: Assess, Prevent, and Manage Pain · Has pain been assessed? Yes  · Need for changes to pain regimen? No   B: Both Spontaneous Awakening Trials (SATs) and Spontaneous Breathing Trials (SBTs) · Plan to perform spontaneous awakening trial today? Yes   · Plan to perform spontaneous breathing trial today? Yes   · Obvious barriers to extubation? No   C: Choice of Sedation · RASS Goal: -1 Drowsy, 0 Alert and Calm or N/A patient not on sedation  · Need for changes to sedation or analgesia regimen? No   D: Delirium · CAM-ICU: Negative   E: Early Mobility  · Plan for early mobility? Yes   F: Family Engagement · Plan for family engagement today? Yes       Review of Invasive Devices: Silver Plan: Continue for accurate I/O monitoring for 48 hours  Central access plan: Hemodynamic monitoring      Prophylaxis:  VTE VTE covered by:  enoxaparin, Subcutaneous, 40 mg at 08/13/23 2876       Stress Ulcer  covered byFamotidine (PF) (PEPCID) injection 20 mg [[id number]]          Subjective   HPI/24hr events: Agitated overnight. Given PRN fentanyl 50 mcg @ 0300.     Review of Systems   Unable to perform ROS: Intubated        Objective                            Vitals I/O      Most Recent Min/Max in 24hrs   Temp 99.8 °F (37.7 °C) Temp  Min: 98 °F (36.7 °C)  Max: 100.9 °F (38.3 °C)   Pulse 86 Pulse  Min: 60  Max: 100   Resp (!) 27 Resp  Min: 15  Max: 27   /61 BP  Min: 100/61  Max: 152/73   O2 Sat 98 % SpO2  Min: 95 %  Max: 100 %      Intake/Output Summary (Last 24 hours) at 8/13/2023 0900  Last data filed at 8/13/2023 0850  Gross per 24 hour   Intake 2404.11 ml   Output 2665 ml   Net -260.89 ml         Diet Enteral/Parenteral; Tube Feeding No Oral Diet; Jevity 1.2 Scott; Continuous; 90; Prosource Protein Liquid - Two Packets Daily; 50; Water; Every 4 hours     Invasive Monitoring Physical exam   No invasive monitoring Physical Exam  Vitals reviewed. Constitutional:       General: He is not in acute distress. Appearance: He is obese. Interventions: He is intubated. HENT:      Head: Normocephalic and atraumatic. Eyes:      General: No scleral icterus. Pupils: Pupils are equal, round, and reactive to light. Cardiovascular:      Rate and Rhythm: Normal rate and regular rhythm. Pulses: Normal pulses. Heart sounds: Normal heart sounds. No murmur heard. No gallop. Pulmonary:      Effort: He is intubated. Breath sounds: Normal breath sounds. No wheezing, rhonchi or rales. Comments: Bilateral breath sounds present  Abdominal:      General: Bowel sounds are normal. There is no distension. Palpations: Abdomen is soft. Musculoskeletal:         General: Swelling (bilateral pedal edema) present. No tenderness. Skin:     General: Skin is warm and dry. Capillary Refill: Capillary refill takes less than 2 seconds. Neurological:      Comments: Intubated and drowsy          Diagnostic Studies       I have personally reviewed pertinent reports. Labs: K 3.4. AST 57. . CSF studies (8/11): Normal crypto. Normal glucose. Protein 46. WBC 9. RBC 2500. Gram stain - rare polys and mononuclear cells, no bacteria. MRI Brain w wo contrast (8/12): No acute intracranial abnormality. Stable nonspecific enhancement along the anterior floor of the third ventricle/hypothalamus when comparing to the March 15, 2023 study. Stable cerebral white matter signal abnormality, presumably the sequela of chronic microangiopathic disease given the patient's history. Medications:  Scheduled PRN   chlorhexidine, 15 mL, Q12H ISAEL  enoxaparin, 40 mg, Q12H ISAEL  Famotidine (PF), 20 mg, BID  FLUoxetine, 10 mg, Daily  potassium chloride, 40 mEq, BID  senna, 1 tablet, HS  thiamine, 500 mg, TID      fentanyl citrate (PF), 50 mcg, Q1H PRN  ondansetron, 4 mg, Q6H PRN  polyethylene glycol, 17 g, Daily PRN       Continuous          Labs:    CBC    Recent Labs     08/12/23  0534 08/13/23  0748   WBC 5.97 7.92   HGB 11.7* 11.6*   HCT 34.2* 34.2*    277     BMP    Recent Labs     08/12/23  0534 08/13/23  0748   SODIUM 134* 134*   K 3.2* 3.4*   CL 96 97   CO2 31 32   AGAP 7 5   BUN 6 8   CREATININE 0.67 1.08   CALCIUM 8.6 9.1       Coags    No recent results     Additional Electrolytes  Recent Labs     08/12/23  0324 08/12/23  0534   MG  --  2.1   PHOS  --  3.6   CAIONIZED 1.13  --           Blood Gas    Recent Labs     08/12/23  0600   PHART 7.473*   QUN3HVJ 45.9*   PO2ART 100.2   DHJ1MAL 32.9*   BEART 8.2   SOURCE Radial, Right     Recent Labs     08/11/23  1656 08/12/23  0600   PHVEN 7.354  --    BSO6MYS 58.3*  --    PO2VEN 55.8*  --    VSB2BPV 31.8*  --    BEVEN 4.6  --    SOURCE  --  Radial, Right    LFTs  Recent Labs     08/12/23  0534 08/13/23  0748   * 162*   AST 64* 57*   ALKPHOS 102 112   ALB 2.7* 3.0*   TBILI 0.74 0.55       Infectious  Recent Labs     08/11/23  1656   PROCALCITONI 0.11     Glucose  Recent Labs     08/11/23  1656 08/12/23  0534 08/13/23  0748   GLUC 88 83 111               Nisha Vallejo MD   PGY-1

## 2023-08-13 NOTE — ASSESSMENT & PLAN NOTE
Wt Readings from Last 3 Encounters:   08/05/23 (!) 154 kg (339 lb 8.1 oz)   07/19/23 (!) 140 kg (309 lb 9.6 oz)   06/19/23 (!) 137 kg (302 lb)     Patient takes 20 torsemide daily at home. Echo 6/2/2023 - LVEF 65% Mildly increased wall thickness. Mild concentric hypertrophy. ECG 8/11 showed sinus rhythm with first degree AV block  Dry weight appears to be around 140 kg based on weight at cardiologist on 7/19.   Trace pedal edema on exam.     Plan:  · IV Lasix 20 mg BID

## 2023-08-13 NOTE — RESPIRATORY THERAPY NOTE
RT Ventilator Management Note  Luis Kendrick 41 y.o. male MRN: [id number]  Unit/Bed#: ICU 12 Encounter: [id number]      Daily Screen         8/12/2023  1115 8/12/2023  1551          Patient safety screen outcome[de-identified] Failed Failed      Not Ready for Weaning due to[de-identified] Underline problem not resolved Underline problem not resolved                Physical Exam:   Assessment Type: (P) Assess only  General Appearance: Drowsy  Respiratory Pattern: Assisted  Chest Assessment: Chest expansion asymmetrical  Bilateral Breath Sounds: (P) Clear  Cough: Non-productive  O2 Device: PB 7200 vent      Resp Comments: (P) Pt. doing well on A/C. No changes throughout the night.

## 2023-08-13 NOTE — ASSESSMENT & PLAN NOTE
Patient with history of HFpEF, seminomatous testicular cancer, EtOH use, presented from [address] [place] [address] on 8/11 with worsening somnolence. He was recently hospitalized from 7/28 - 8/5/2023 due to frequent falls and CHF exacerbation. Initially admitted to med/surg but ICU called due to low GCS, and patient was intubated for airway protection. He was extubated the morning of 8/13 due to improving mental status. CSF studies have been mostly unrevealing. Per neurology, encephalopathy may be Wernicke's due to improvement after being given high dose thiamine replacement. Other potential causes include narcolepsy due to decreased sleep latency and increased REM on EEG. Less likely autoimmune or psychogenic. • 8/12 MRI - Stable nonspecific enhancement along the anterior floor of third ventricle/hyopthalamus.      • Plan:   · Q4 neuro checks  · vEEG   · Continue IV thiamine 500 mg TID x5 days then transition to oral   · Modafinil 100 mg daily   · F/u CSF studies - ENS2, VDRL, orexin pending  · F/u urine toxicology screen - was taken after patient given fentanyl

## 2023-08-13 NOTE — ASSESSMENT & PLAN NOTE
Patient noted to be somnolent after admission with GCS of 5, requiring intubation. Refer to A&P for encephalopathy.

## 2023-08-13 NOTE — ASSESSMENT & PLAN NOTE
Hx of HTN.  Only on torsemide 20 mg daily at home and has been controlled while inpatient  Now on IV lasix 20 mg BID till consistent oral intake     Plan:  • Restart Torsemide 20 mg daily from 8/16

## 2023-08-13 NOTE — PLAN OF CARE
Problem: Potential for Falls  Goal: Patient will remain free of falls  Description: INTERVENTIONS:  - Educate patient/family on patient safety including physical limitations  - Instruct patient to call for assistance with activity   - Consult OT/PT to assist with strengthening/mobility   - Keep Call bell within reach  - Keep bed low and locked with side rails adjusted as appropriate  - Keep care items and personal belongings within reach  - Initiate and maintain comfort rounds  - Make Fall Risk Sign visible to staff  - Offer Toileting every  Hours, in advance of need  - Initiate/Maintain alarm  - Obtain necessary fall risk management equipment:   - Apply yellow socks and bracelet for high fall risk patients  - Consider moving patient to room near nurses station  Outcome: Progressing     Problem: MOBILITY - ADULT  Goal: Maintain or return to baseline ADL function  Description: INTERVENTIONS:  -  Assess patient's ability to carry out ADLs; assess patient's baseline for ADL function and identify physical deficits which impact ability to perform ADLs (bathing, care of mouth/teeth, toileting, grooming, dressing, etc.)  - Assess/evaluate cause of self-care deficits   - Assess range of motion  - Assess patient's mobility; develop plan if impaired  - Assess patient's need for assistive devices and provide as appropriate  - Encourage maximum independence but intervene and supervise when necessary  - Involve family in performance of ADLs  - Assess for home care needs following discharge   - Consider OT consult to assist with ADL evaluation and planning for discharge  - Provide patient education as appropriate  Outcome: Progressing  Goal: Maintains/Returns to pre admission functional level  Description: INTERVENTIONS:  - Perform BMAT or MOVE assessment daily.   - Set and communicate daily mobility goal to care team and patient/family/caregiver.    - Collaborate with rehabilitation services on mobility goals if consulted  - Perform Range of Motion 3 times a day. - Reposition patient every 2 hours.   - Dangle patient 3 times a day  - Stand patient 3 times a day  - Ambulate patient 3 times a day  - Out of bed to chair 3 times a day   - Out of bed for meals 3 times a day  - Out of bed for toileting  - Record patient progress and toleration of activity level   Outcome: Progressing     Problem: Prexisting or High Potential for Compromised Skin Integrity  Goal: Skin integrity is maintained or improved  Description: INTERVENTIONS:  - Identify patients at risk for skin breakdown  - Assess and monitor skin integrity  - Assess and monitor nutrition and hydration status  - Monitor labs   - Assess for incontinence   - Turn and reposition patient  - Assist with mobility/ambulation  - Relieve pressure over bony prominences  - Avoid friction and shearing  - Provide appropriate hygiene as needed including keeping skin clean and dry  - Evaluate need for skin moisturizer/barrier cream  - Collaborate with interdisciplinary team   - Patient/family teaching  - Consider wound care consult   Outcome: Progressing     Problem: PAIN - ADULT  Goal: Verbalizes/displays adequate comfort level or baseline comfort level  Description: Interventions:  - Encourage patient to monitor pain and request assistance  - Assess pain using appropriate pain scale  - Administer analgesics based on type and severity of pain and evaluate response  - Implement non-pharmacological measures as appropriate and evaluate response  - Consider cultural and social influences on pain and pain management  - Notify physician/advanced practitioner if interventions unsuccessful or patient reports new pain  Outcome: Progressing     Problem: INFECTION - ADULT  Goal: Absence or prevention of progression during hospitalization  Description: INTERVENTIONS:  - Assess and monitor for signs and symptoms of infection  - Monitor lab/diagnostic results  - Monitor all insertion sites, i.e. indwelling lines, tubes, and drains  - Monitor endotracheal if appropriate and nasal secretions for changes in amount and color  - [place] appropriate cooling/warming therapies per order  - Administer medications as ordered  - Instruct and encourage patient and family to use good hand hygiene technique  - Identify and instruct in appropriate isolation precautions for identified infection/condition  Outcome: Progressing     Problem: SAFETY ADULT  Goal: Patient will remain free of falls  Description: INTERVENTIONS:  - Educate patient/family on patient safety including physical limitations  - Instruct patient to call for assistance with activity   - Consult OT/PT to assist with strengthening/mobility   - Keep Call bell within reach  - Keep bed low and locked with side rails adjusted as appropriate  - Keep care items and personal belongings within reach  - Initiate and maintain comfort rounds  - Make Fall Risk Sign visible to staff  - Offer Toileting every  Hours, in advance of need  - Initiate/Maintain alarm  - Obtain necessary fall risk management equipment:   - Apply yellow socks and bracelet for high fall risk patients  - Consider moving patient to room near nurses station  Outcome: Progressing  Goal: Maintain or return to baseline ADL function  Description: INTERVENTIONS:  -  Assess patient's ability to carry out ADLs; assess patient's baseline for ADL function and identify physical deficits which impact ability to perform ADLs (bathing, care of mouth/teeth, toileting, grooming, dressing, etc.)  - Assess/evaluate cause of self-care deficits   - Assess range of motion  - Assess patient's mobility; develop plan if impaired  - Assess patient's need for assistive devices and provide as appropriate  - Encourage maximum independence but intervene and supervise when necessary  - Involve family in performance of ADLs  - Assess for home care needs following discharge   - Consider OT consult to assist with ADL evaluation and planning for discharge  - Provide patient education as appropriate  Outcome: Progressing  Goal: Maintains/Returns to pre admission functional level  Description: INTERVENTIONS:  - Perform BMAT or MOVE assessment daily.   - Set and communicate daily mobility goal to care team and patient/family/caregiver. - Collaborate with rehabilitation services on mobility goals if consulted  - Perform Range of Motion 3 times a day. - Reposition patient every 2 hours. - Dangle patient 3 times a day  - Stand patient 3 times a day  - Ambulate patient 3 times a day  - Out of bed to chair 3 times a day   - Out of bed for meals 3 times a day  - Out of bed for toileting  - Record patient progress and toleration of activity level   Outcome: Progressing     Problem: DISCHARGE PLANNING  Goal: Discharge to home or other facility with appropriate resources  Description: INTERVENTIONS:  - Identify barriers to discharge w/patient and caregiver  - Arrange for needed discharge resources and transportation as appropriate  - Identify discharge learning needs (meds, wound care, etc.)  - Arrange for interpretive services to assist at discharge as needed  - Refer to Case Management Department for coordinating discharge planning if the patient needs post-hospital services based on physician/advanced practitioner order or complex needs related to functional status, cognitive ability, or social support system  Outcome: Progressing     Problem: Knowledge Deficit  Goal: Patient/family/caregiver demonstrates understanding of disease process, treatment plan, medications, and discharge instructions  Description: Complete learning assessment and assess knowledge base.   Interventions:  - Provide teaching at level of understanding  - Provide teaching via preferred learning methods  Outcome: Progressing     Problem: Nutrition/Hydration-ADULT  Goal: Nutrient/Hydration intake appropriate for improving, restoring or maintaining nutritional needs  Description: Monitor and assess patient's nutrition/hydration status for malnutrition. Collaborate with interdisciplinary team and initiate plan and interventions as ordered. Monitor patient's weight and dietary intake as ordered or per policy. Utilize nutrition screening tool and intervene as necessary. Determine patient's food preferences and provide high-protein, high-caloric foods as appropriate.      INTERVENTIONS:  - Monitor oral intake, urinary output, labs, and treatment plans  - Assess nutrition and hydration status and recommend course of action  - Evaluate amount of meals eaten  - Assist patient with eating if necessary   - Allow adequate time for meals  - Recommend/ encourage appropriate diets, oral nutritional supplements, and vitamin/mineral supplements  - Order, calculate, and assess calorie counts as needed  - Recommend, monitor, and adjust tube feedings and TPN/PPN based on assessed needs  - Assess need for intravenous fluids  - Provide specific nutrition/hydration education as appropriate  - Include patient/family/caregiver in decisions related to nutrition  Outcome: Progressing     Problem: SAFETY,RESTRAINT: NV/NON-SELF DESTRUCTIVE BEHAVIOR  Goal: Remains free of harm/injury (restraint for non violent/non self-detsructive behavior)  Description: INTERVENTIONS:  - Instruct patient/family regarding restraint use   - Assess and monitor physiologic and psychological status   - Provide interventions and comfort measures to meet assessed patient needs   - Identify and implement measures to help patient regain control  - Assess readiness for release of restraint   Outcome: Progressing  Goal: Returns to optimal restraint-free functioning  Description: INTERVENTIONS:  - Assess the patient's behavior and symptoms that indicate continued need for restraint  - Identify and implement measures to help patient regain control  - Assess readiness for release of restraint   Outcome: Progressing

## 2023-08-13 NOTE — SPEECH THERAPY NOTE
Speech Language/Pathology  Speech/Language Pathology  Assessment    Patient Name: David Croft  GXPHS'O Date: 8/13/2023     Problem List  Principal Problem:    Encephalopathy  Active Problems:    Hypertension    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HCC)    Cerebral gliosis    Unilateral vestibular schwannoma (HCC)    Chronic diastolic heart failure (HCC)    Fall    Hyponatremia    Bipolar I disorder (HCC)    Obesity hypoventilation syndrome (HCC)    Somnolence    Acute respiratory failure (HCC)    Catatonic reaction    Past Medical History  Past Medical History:   Diagnosis Date   • Anxiety    • Cancer ([address])    • Depression    • Psychiatric disorder     bipolar     Past Surgical History  Past Surgical History:   Procedure Laterality Date   • IR BIOPSY LYMPH NODE  1/20/2023   • IR PORT PLACEMENT  3/14/2023   • ORCHIECTOMY Left 12/14/2022    Procedure: ORCHIECTOMY INGUINAL;  Surgeon: Anders Freed MD;  Location: [facility];  Service: Urology        Bedside Swallow Evaluation:    Summary:  Pt presented w/ functional oral and suspected moderate pharyngeal dysphagia w/ materials presented today. Pt retrieved small bites of puree + soft solids. Pt trialed thin, nectar and honey-thick liquids via cup + straw. Mastication + oral manipulation of soft solids was mildly prolonged, pt req occasional breaks, though overall effective. Pt w/ increased WOB + delayed cough following trials of thin + nectar. Pt req frequent breaks between trials for RR to decrease. Education provided on aspiration risk + s/s aspiration to monitor for and notify medical team of should they arise; pt nodded head in understanding and denied any questions at this time. ST to f/u to ensure diet tolerance + re-assess potential for VBS/ upgrade.     Recommendations:  Diet: Puree  Liquid: Honey-Thick  Meds: w/ puree  Supervision: close  Positioning:Upright  Strategies: slow rate, small bites/sips, alternate bites w/ sips  Pt to take PO/Meds only when fully alert and upright. Oral care: 3-4x/daily  Aspiration precautions  Therapy Prognosis: guarded  Prognosis considerations: age, current medical, past medical  Frequency: ST to f/u as able+appropriate    Consider consult w/: none    Goal(s):  Pt will tolerate least restrictive diet w/out s/s aspiration or oral/pharyngeal difficulties. H&P/Admit info/ pertinent provider notes: (PMH noted above)  70-year-old male with history of metastatic testicular cancer status post left orchiectomy, bipolar disorder, heart failure with preserved ejection fraction, and several incidents of metabolic disturbances with altered mental status, presents emergency room with altered mental status and general unresponsiveness which was reported to have began this morning around 11 AM.  Patient came from [facility] and was reported to have increased lethargy as the morning when on. Per EMS he was placed on BiPAP and was still unresponsive and was then brought here via EMS. Upon arrival he is unresponsive and unable to to communicate. EMS fingerstick glucose on route was 88. Patient responsive to noxious stimuli only. EMS states he feels cold, breathing spontaneously and saturating 100% on supplemental oxygen. Special Studies: MRI brain w wo contrast 8/12: IMPRESSION:     No acute intracranial abnormality.     Stable nonspecific enhancement along the anterior floor of the third ventricle/hypothalamus when comparing to the March 15, 2023 study. Differential includes chronic toxic metabolic insult (thiamine deficiency), chronic inflammatory/ infectious   conditions. Other considerations include Langerhans cell cell histiocytosis, lymphocytic hypophysitis, and neurosyphilis. Primary CNS lymphoma is a less likely consideration, particularly given the lack of interval change.  Serologic correlation is   recommended.     Stable cerebral white matter signal abnormality, presumably the sequela of chronic microangiopathic disease given the patient's history. XR chest port 8/12: IMPRESSION:     ET tube 7 cm above the prashant.     Persistent bibasilar atelectasis. Previous MBS: none    Patient's goal: pt remained nonverbal during eval    Did the pt report pain? none    Reason for consult:  R/o aspiration  Determine safest and least restrictive diet    Precautions: none    Food Allergies: none   Current Diet: npo   Premorbid diet: Regular/thin   O2 requirement: 6L nc   Social/Prior living Lives w/ partner   Voice/Speech: Pt did not speak during eval   Follows commands: wfl   Cognitive status: Awakw; alert; in chair     Oral [facility] exam:  Dentition: adequate  Lips (VII): wfl  Tongue (XII): wfl  Mandible (V): wfl  Face/oral sensation (V): wfl  Velum (X): unable to visualize  Secretion management: wfl  Volitional cough: weak  Volitional swallow: wfl    Items administered:  Puree, soft solid, honey thick liquid, nectar thick liquid, thin liquids.  Liquids were taken by straw + cup     Oral stage:  Lip closure: wfl  Mastication: mild prolonged  Bolus formation: wfl  Bolus control: wfl  Transfer: wfl  Oral residue: none  Pocketing: none    Pharyngeal stage:  Swallow promptness: suspect delayed  Laryngeal rise: suspect reduced  Wet voice: none  Throat clear: none  Cough: x2 following both nectar + thin  Secondary swallows: none  Audible swallows: none  No overt s/s aspiration    Esophageal stage:  No s/s reported    Aspiration precautions posted    Results d/w:  Pt, nursing, physician

## 2023-08-13 NOTE — ASSESSMENT & PLAN NOTE
Required mechanical ventilation 8/12-8/13 for airway protection.  Extubated morning of 8/13 due to improving mental status.     Plan:  • Weaned to RA

## 2023-08-13 NOTE — ASSESSMENT & PLAN NOTE
Required mechanical ventilation 8/12-8/13 for airway protection. Extubated morning of 8/13 due to improving mental status.     Plan:  · On NC  · Wean to RA

## 2023-08-13 NOTE — ASSESSMENT & PLAN NOTE
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 Chemotherapy started in March 2023 due to enlarged para-aortic lymph nodes and increase in LDH. Chemo sopped in May 2023 due to toxicity. CTAP 5/9/2023: 2 para-aortic lymph nodes remain minimally increased in size, unchanged. One of these was biopsied and returned back as necrosis without viable tumor in January.   Sen by onc - MRI and CT with no evidence of germ cell tumor recurrence  AFP - 2.54, LDH - 296, HCG neg     Plan:  • Outpatient follow-up

## 2023-08-13 NOTE — PROGRESS NOTES
[facility]  Progress Note: Critical Care  Name: Juan Alberto Lagunas 41 y.o. male I MRN: [id number]  Unit/Bed#: ICU 15 I Date of Admission: 8/11/2023   Date of Service: 8/13/2023 I Hospital Day: 2    Assessment/Plan   Neuro:   · Diagnosis: Encephalopathy  · Assessment: presented from [address] [place] [address] on 8/11 with worsening somnolence and [facility] to [facility] ICU called due to low GCS. Patient intubated for airway protection. · 8/12 MRI - Stable nonspecific enhancement along the anterior floor of third ventricle/hyopthalamus. · Plan:   · Q1 neuro checks  · vEEG  · F/u CSF studies - unremarkable   · F/u urine toxicology screen      CV:   · Diagnosis: Diastolic heart failure   · Plan:   · Holding torsemide  · IV Lasix 40 mg today   · Monitor I/O, daily weights    Pulm:  · Diagnosis: Acute respiratory failure requiring mechanical ventilation  · Patient not protecting airway due to AMS  · Plan:   · Spontaneous 6/6 @ 7:30 am 8/13. · Wean to extubation  · PRN fentanyl for agitation    GI:   · Diagnosis: hepatic steatosis  · Elevated transaminases  · Plan:  · Monitor CMP    · Tube feeds - Jevity 1.2 killian. Goal 90 ml/hr. 50 ml flush Q4.  · IV famotidine 20 mg      :   · Diagnosis: Sheppard catheter  · Plan:   · Monitor I/O      F/E/N:   F: None  E: Maintain K >4, Phos >3, Mg >2  N: Jevity 1.2    Heme/Onc:   · Diagnosis: DVT Prophylaxis  · Plan: Lovenox      Endo:   No active issues  Monitor glucose    ID:   · Diagnosis: Fever  · Assessment: Tmax of 100.9 morning of 8/13. Pulse has been 80s-90s. · Plan:   · D/c Tylenol  · Monitor fever curve  · F/u CBC  · Consider infectious workup        MSK/Skin:   · Turn patient  · OOB and ambulate once extubated  · PT/OT      Disposition: Critical care    ICU Core Measures     Vented Patient  VAP Bundle  VAP bundle ordered     A: Assess, Prevent, and Manage Pain · Has pain been assessed? Yes  · Need for changes to pain regimen?  No   B: Both Spontaneous Awakening Trials (SATs) and Spontaneous Breathing Trials (SBTs) · Plan to perform spontaneous awakening trial today? Yes   · Plan to perform spontaneous breathing trial today? Yes   · Obvious barriers to extubation? No   C: Choice of Sedation · RASS Goal: 0 Alert and Calm  · Need for changes to sedation or analgesia regimen? No   D: Delirium · CAM-ICU: Negative   E: Early Mobility  · Plan for early mobility? Yes   F: Family Engagement · Plan for family engagement today? Yes       Review of Invasive Devices: Silver Plan: Continue for accurate I/O monitoring for 48 hours  Central access plan: Hemodynamic monitoring      Prophylaxis:  VTE VTE covered by:  enoxaparin, Subcutaneous, 40 mg at 08/12/23 2115       Stress Ulcer  covered byFamotidine (PF) (PEPCID) injection 20 mg [[id number]]          Subjective   HPI/24hr events: 41 y.o male presenting with somnolence, intubated for airway protection due to AMS. Per overnight team, patient required 50 mcg of fentanyl for agitation. This morning he did have a fever of 100.9 and pulse elevated to the 90s. Patient was initially asleep on exam this morning but opened eyes to voice. He is able to follow commands and answer yes/no questions. He denies any pain this morning, fevers, or chills.  RT switched vent settings from AC/VC to spontaneous at 7:30 am        Objective                            Vitals I/O      Most Recent Min/Max in 24hrs   Temp (!) 100.9 °F (38.3 °C) Temp  Min: 98 °F (36.7 °C)  Max: 100.9 °F (38.3 °C)   Pulse 94 Pulse  Min: 60  Max: 100   Resp 17 Resp  Min: 15  Max: 22   /51 BP  Min: 100/61  Max: 152/73   O2 Sat 98 % SpO2  Min: 95 %  Max: 100 %      Intake/Output Summary (Last 24 hours) at 8/13/2023 0646  Last data filed at 8/13/2023 0600  Gross per 24 hour   Intake 3069.87 ml   Output 1760 ml   Net 1309.87 ml         Diet Enteral/Parenteral; Tube Feeding No Oral Diet; Jevity 1.2 Scott; Continuous; 90; Prosource Protein Liquid - Two Packets Daily; 50; Water; Every 4 hours     Invasive Monitoring Physical exam   Arterial Line  Bambi BP    No data recorded   MAP    No data recorded    Physical Exam  Constitutional:       General: He is not in acute distress. Eyes:      Extraocular Movements: Extraocular movements intact. Conjunctiva/sclera: Conjunctivae normal.      Pupils: Pupils are equal, round, and reactive to light. Cardiovascular:      Rate and Rhythm: Normal rate and regular rhythm. Pulses: Normal pulses. Heart sounds: Normal heart sounds. Abdominal:      General: Bowel sounds are normal.      Palpations: Abdomen is soft. Musculoskeletal:         General: Normal range of motion. Skin:     General: Skin is warm and dry. Neurological:      Mental Status: He is alert. Cranial Nerves: No cranial nerve deficit. Sensory: No sensory deficit. Comments: Squeezes hand and wiggles toes bilaterally. Diagnostic Studies      EKG: NSR  Imaging:  I have personally reviewed pertinent reports.        Medications:  Scheduled PRN   chlorhexidine, 15 mL, Q12H ISAEL  enoxaparin, 40 mg, Q12H ISAEL  Famotidine (PF), 20 mg, BID  FLUoxetine, 10 mg, Daily  senna, 1 tablet, HS  thiamine, 500 mg, TID      acetaminophen, 650 mg, Q6H PRN  fentanyl citrate (PF), 50 mcg, Q1H PRN  ondansetron, 4 mg, Q6H PRN       Continuous          Labs:    CBC    Recent Labs     08/11/23  1656 08/12/23  0534   WBC 9.05 5.97   HGB 12.7 11.7*   HCT 37.9 34.2*    253     BMP    Recent Labs     08/11/23  1656 08/12/23  0534   SODIUM 129* 134*   K 3.6 3.2*   CL 90* 96   CO2 32 31   AGAP 7 7   BUN 8 6   CREATININE 0.84 0.67   CALCIUM 9.3 8.6       Coags    No recent results     Additional Electrolytes  Recent Labs     08/12/23  0324 08/12/23  0534   MG  --  2.1   PHOS  --  3.6   CAIONIZED 1.13  --           Blood Gas    Recent Labs     08/12/23  0600   PHART 7.473*   [id number] 45.9*   PO2ART 100.2   [id number] 32.9*   BEART 8.2   SOURCE Radial, Right Recent Labs     08/11/23 1656 08/12/23  0600   PHVEN 7.354  --    [id number] 58.3*  --    PO2VEN 55.8*  --    [id number] 31.8*  --    BEVEN 4.6  --    SOURCE  --  Radial, Right    LFTs  Recent Labs     08/11/23 1656 08/12/23  0534   * 142*   AST 77* 64*   ALKPHOS 133* 102   ALB 3.4* 2.7*   TBILI 0.53 0.74       Infectious  Recent Labs     08/11/23 1656   PROCALCITONI 0.11     Glucose  Recent Labs     08/11/23 1656 08/12/23  0534   GLUC [id number] [facility], [place] 4

## 2023-08-13 NOTE — PROGRESS NOTES
NEUROLOGY RESIDENCY PROGRESS NOTE     Name: Yary Choi   Age & Sex: 41 y.o. male   MRN: [id number]  Unit/Bed#: ICU 15   Encounter: [id number]    Recommendations for outpatient neurological follow up have yet to be determined. Pending for discharge: Clinical Improvement    ASSESSMENT & PLAN     Somnolence  Assessment & Plan  41 y.o. male who has past medical history significant for morbid obesity, testicular cancer (seminoma) with status postchemotherapy, hypertension, bipolar depression, recent admission to [facility] for rehabilitation secondary to fall. He's coming to the ED for worsening somnolence that has been going on for the past several days. Apparently per chart review, patient has had bouts of depression and ashley consistent with his bipolar and has overall slept throughout the day the past several days as well as decreased his oral intake. When he was evaluated in the ED by [facility], he was somnolent but responded to pain in his lower extremities and there was some functional testing that was done including the drop arm on his face and he avoided hurting his face as well as resisting examiner when they were trying to assess V1 strength and patient resisted examiner from letting them open his eyes. Patient was sent to the floors and when patient was examined, patient was noted to be quite somnolent and ICU was called to evaluate and had a GCS of 5 and patient was intubated for airway protection. Neurology was called to evaluate for possible seizure/because of somnolence, patient was already intubated but did not get any benzodiazepines. Patient was agitated and was able to follow commands was cross midline, raising eyebrows, blinking appropriately to command, squeezing hands bilaterally, giving thumbs up and showing 2 fingers, bending bilateral legs appropriately and wiggling toes and patient was able to point to legs that were getting sensory testing.     W/U:  CT CAP: Limited inspiratory volume. Interval improvement in bibasilar atelectatic changes. Hepatic steatosis. Stable borderline left para-aortic adenopathy. CTH: No acute intracranial abnormality. Stable small bilateral subcortical hypoattenuating foci. No associated mass effect. MRI brain w/ and w/o contrast: stable nonspecific enhancement along the anterior floor of the 3rd Ventricle/Hypothalamus when comparing to March 15, 2023. Differential includes chronic toxic metabolic insult (thiamine deficiency), chronic inflammatory/ infectious  conditions. Other considerations include Langerhans cell cell histiocytosis, lymphocytic hypophysitis, and neurosyphilis. Primary CNS lymphoma is a less likely consideration, particularly given the lack of interval change. Serologic correlation is  recommended. UA bland   TSH 2.421 WNL  Procal: 0.11 WNL  Lactic Acid: 1.1 WNL  Ammonia: 21  Ehtanol <3  WBC: 9.05  VB.354/58.3/55.8/31.8    CSF studies  RBC: 2500  WBC: 9  Glucose: 59  Protein: 46  ME panel negative   Negative gram stain CSF    Impression: Unclear etiology of somnolence could possibly be 2/2 to psychiatriac or functional neurologic disorder. Somnolence that could possibly due to a psychiatric/functional neurologic disorder versus thiamine deficiency. Much improvement in exam today with following commands appropriately. Noted to have bilateral vestibular schwannomas on prior MRI imaging.     Plan:   · D/c EEG due to no seizures, mild background slowing  · Thiamine levels and empiric replenishment  · Waiting on B1 level  · Adding ENS 2 to CSF studies to r/o paraneoplastic   · ICU team possibly consider Hem/Onc evaluation  · Plan to extubate today  · Would hold off on any AEDs or benzodiazepines at this time given patient is able to appropriately follow commands  · Rest of care per primary  · Agree with having psychiatry evaluate patient for possible catatonia/functional neurologic disorder      SUBJECTIVE     Patient is a 51-year-old male presented to the ED as a result of being somnolent and from rehab. Today, patient is still intubated. Patient was seen and examined. No acute events overnight. Review of Systems   Unable to perform ROS: Intubated       OBJECTIVE     Patient ID: Shireen Pleitez is a 41 y.o. male. Vitals:    23 0830 23 0915 23 0930 23 1000   BP: (!) 104/43 (!) 99/34 (!) 112/43 100/58   BP Location: Left arm Left arm Left arm Left arm   Pulse: 96 102 96 96   Resp: 22 (!) 31 (!) 28 (!) 23   Temp:       TempSrc:       SpO2: 97% 96% 97% 96%      Temperature:   Temp (24hrs), Av.1 °F (37.3 °C), Min:98 °F (36.7 °C), Max:100.9 °F (38.3 °C)    Temperature: 99.8 °F (37.7 °C)      Physical Exam  Vitals reviewed. Constitutional:       Appearance: Normal appearance. Comments: Patient intubated. HENT:      Head: Normocephalic and atraumatic. Mouth/Throat:      Mouth: Mucous membranes are moist.   Eyes:      Extraocular Movements: Extraocular movements intact. Conjunctiva/sclera: Conjunctivae normal.      Pupils: Pupils are equal, round, and reactive to light. Cardiovascular:      Rate and Rhythm: Normal rate. Pulmonary:      Effort: Pulmonary effort is normal.      Comments: Intubated currently. Musculoskeletal:         General: Normal range of motion. Right lower leg: No edema. Left lower leg: No edema. Skin:     General: Skin is warm. Neurological:      Mental Status: He is alert and oriented to person, place, and time. Cranial Nerves: Cranial nerves 2-12 are intact. Coordination: Finger-Nose-Finger Test normal.      Deep Tendon Reflexes:      Reflex Scores:       Tricep reflexes are 2+ on the right side and 2+ on the left side. Bicep reflexes are 2+ on the right side and 2+ on the left side. Brachioradialis reflexes are 2+ on the right side and 2+ on the left side.        Patellar reflexes are 2+ on the right side and 2+ on the left side. Psychiatric:         Mood and Affect: Mood normal.         Speech: Speech normal.         Behavior: Behavior normal.          Neurologic Exam     Mental Status   Oriented to person, place, and time. Follows 1 step commands. Attention: normal. Concentration: normal.   Speech: speech is normal   Level of consciousness: alert  Knowledge: good. Cranial Nerves   Cranial nerves II through XII intact. CN II   Visual fields full to confrontation. CN III, IV, VI   Pupils are equal, round, and reactive to light. Upgaze: normal  Downgaze: normal  Conjugate gaze: present    CN V   Facial sensation intact. CN VII   Facial expression full, symmetric. CN VIII   CN VIII normal.     CN IX, X   CN IX normal.     CN XI   CN XI normal.   Unable to assess tongue deviation given limitation of assistive devices. Motor Exam   Muscle bulk: normal  Overall muscle tone: normal  Right arm tone: normal  Left arm tone: normal  Right leg tone: normal  Left leg tone: normal    Sensory Exam   Light touch normal.     Gait, Coordination, and Reflexes     Coordination   Finger to nose coordination: normal    Reflexes   Right brachioradialis: 2+  Left brachioradialis: 2+  Right biceps: 2+  Left biceps: 2+  Right triceps: 2+  Left triceps: 2+  Right patellar: 2+  Left patellar: 2+      LABORATORY DATA     Labs: I have personally reviewed pertinent reports.     Results from last 7 days   Lab Units 08/13/23  0748 08/12/23  0534 08/11/23  1656   WBC Thousand/uL 7.92 5.97 9.05   HEMOGLOBIN g/dL 11.6* 11.7* 12.7   HEMATOCRIT % 34.2* 34.2* 37.9   PLATELETS Thousands/uL 277 253 210   NEUTROS PCT % 71 72 72   MONOS PCT % 11 9 9   EOS PCT % 1 1 1      Results from last 7 days   Lab Units 08/13/23  0748 08/12/23  0534 08/11/23  1656   SODIUM mmol/L 134* 134* 129*   POTASSIUM mmol/L 3.4* 3.2* 3.6   CHLORIDE mmol/L 97 96 90*   CO2 mmol/L 32 31 32   BUN mg/dL 8 6 8   CREATININE mg/dL 1.08 0.67 0.84   CALCIUM mg/dL 9.1 8.6 9.3 ALK PHOS U/L 112 102 133*   ALT U/L 162* 142* 172*   AST U/L 57* 64* 77*     Results from last 7 days   Lab Units 08/12/23  0534   MAGNESIUM mg/dL 2.1     Results from last 7 days   Lab Units 08/12/23  0534   PHOSPHORUS mg/dL 3.6          Results from last 7 days   Lab Units 08/11/23  1656   LACTIC ACID mmol/L 1.1           IMAGING & DIAGNOSTIC TESTING     Radiology Results: I have personally reviewed pertinent reports. MRI brain w wo contrast   Final Result by Dariela Henry MD (08/12 0935)      No acute intracranial abnormality. Stable nonspecific enhancement along the anterior floor of the third ventricle/hypothalamus when comparing to the March 15, 2023 study. Differential includes chronic toxic metabolic insult (thiamine deficiency), chronic inflammatory/ infectious    conditions. Other considerations include Langerhans cell cell histiocytosis, lymphocytic hypophysitis, and neurosyphilis. Primary CNS lymphoma is a less likely consideration, particularly given the lack of interval change. Serologic correlation is    recommended. Stable cerebral white matter signal abnormality, presumably the sequela of chronic microangiopathic disease given the patient's history. The study was marked in EPIC for significant notification. Workstation performed: [id number]         XR chest portable ICU   Final Result by Carlos Eduardo Walters MD (08/12 1020)      ET tube 7 cm above the prashant. Persistent bibasilar atelectasis. Workstation performed: [id number]         CT head without contrast   Final Result by Estuardo Canales MD (08/11 2026)      No acute intracranial abnormality. Stable small bilateral subcortical hypoattenuating foci. No associated mass effect. Workstation performed: [id number]         CT chest abdomen pelvis w contrast   Final Result by Estuardo Canales MD (08/11 2037)      Limited inspiratory volume.  Interval improvement in bibasilar atelectatic changes. Hepatic steatosis. Stable borderline left para-aortic adenopathy. Workstation performed: [id number]             Other Diagnostic Testing: I have personally reviewed pertinent reports. ACTIVE MEDICATIONS     Current Facility-Administered Medications   Medication Dose Route Frequency   • chlorhexidine (PERIDEX) 0.12 % oral rinse 15 mL  15 mL Mouth/Throat Q12H [facility]   • enoxaparin (LOVENOX) subcutaneous injection 40 mg  40 mg Subcutaneous Q12H [facility]   • Famotidine (PF) (PEPCID) injection 20 mg  20 mg Intravenous BID   • fentanyl citrate (PF) 100 MCG/2ML 50 mcg  50 mcg Intravenous Q1H PRN   • FLUoxetine (PROzac) capsule 10 mg  10 mg Oral Daily   • furosemide (LASIX) injection 20 mg  20 mg Intravenous BID (diuretic)   • ondansetron (ZOFRAN) injection 4 mg  4 mg Intravenous Q6H PRN   • polyethylene glycol (MIRALAX) packet 17 g  17 g Oral Daily PRN   • potassium chloride oral solution 40 mEq  40 mEq Oral BID   • senna (SENOKOT) tablet 8.6 mg  1 tablet Oral HS   • thiamine (VITAMIN B1) 500 mg in sodium chloride 0.9 % 50 mL IVPB  500 mg Intravenous TID       Prior to Admission medications    Medication Sig Start Date End Date Taking? Authorizing Provider   albuterol (ProAir HFA) 90 mcg/act inhaler Inhale 2 puffs every 6 (six) hours as needed for wheezing 12/14/22   Reese Herring DO   albuterol (PROVENTIL HFA,VENTOLIN HFA) 90 mcg/act inhaler Inhale 2 puffs every 4 (four) hours as needed for wheezing    Historical Provider, MD   Enoxaparin Sodium (LOVENOX SC) Inject 40 mg under the skin every 12 (twelve) hours    Historical Provider, MD   FLUoxetine (PROzac) 10 mg capsule Take 10 mg by mouth daily    Historical Provider, MD   potassium chloride (K-DUR,KLOR-CON) 20 mEq tablet Take 2 tablets (40 mEq total) by mouth daily Do not start before August 6, 2023.   Patient taking differently: Take 40 mEq by mouth 2 (two) times a day before breakfast and lunch 8/6/23   Svetlana Parkin Evonne Leon MD   QUEtiapine (SEROquel) 25 mg tablet Take 25 mg by mouth daily at bedtime    Historical Provider, MD   senna-docusate sodium (SENOKOT S) 8.6-50 mg per tablet Take 1 tablet by mouth daily at bedtime 8/5/23   Patel Dotson MD   torsemide [facility]) 20 mg tablet Take 1 tablet (20 mg total) by mouth daily 5/17/23   Red Walton MD         VTE Pharmacologic Prophylaxis: Enoxaparin (Lovenox)  VTE Mechanical Prophylaxis: sequential compression device and foot pump applied    ==  DO Ruben Chaney Neurology Residency, PGY-2

## 2023-08-13 NOTE — ASSESSMENT & PLAN NOTE
Wt Readings from Last 3 Encounters:   08/05/23 (!) 154 kg (339 lb 8.1 oz)   07/19/23 (!) 140 kg (309 lb 9.6 oz)   06/19/23 (!) 137 kg (302 lb)     Patient takes 20 torsemide daily at home. Echo 6/2/2023 - LVEF 65% Mildly increased wall thickness. Mild concentric hypertrophy. ECG 8/11 showed sinus rhythm with first degree AV block  Dry weight appears to be around 140 kg based on weight at cardiologist on 7/19.   Trace pedal edema on exam.      Plan:  • Change IV lasix to Torsemide from am

## 2023-08-13 NOTE — PROGRESS NOTES
Critical Care Interval Transfer Note:    * Encephalopathy  Assessment & Plan  Patient with history of HFpEF, seminomatous testicular cancer, EtOH use, presented from [address] [place] [address] on 8/11 with worsening somnolence. He was recently hospitalized from 7/28 - 8/5/2023 due to frequent falls and CHF exacerbation. Initially admitted to med/surg but ICU called due to low GCS, and patient was intubated for airway protection. He was extubated the morning of 8/13 due to improving mental status. CSF studies have been mostly unrevealing. Per neurology, encephalopathy may be Wernicke's due to improvement after being given high dose thiamine replacement. Other potential causes include narcolepsy due to decreased sleep latency and increased REM on EEG. Less likely autoimmune or psychogenic. • 8/12 MRI - Stable nonspecific enhancement along the anterior floor of third ventricle/hyopthalamus. • Plan:   · Q4 neuro checks  · vEEG   · Continue IV thiamine 500 mg TID x5 days then transition to oral   · Modafinil 100 mg daily   · F/u CSF studies - ENS2, VDRL, orexin pending  · F/u urine toxicology screen - was taken after patient given fentanyl    Acute respiratory failure [facility])  Assessment & Plan  Required mechanical ventilation 8/12-8/13 for airway protection. Extubated morning of 8/13 due to improving mental status. Plan:  · On NC  · Wean to RA     Somnolence  Assessment & Plan  Patient noted to be somnolent after admission with GCS of 5, requiring intubation. Refer to A&P for encephalopathy. Obesity hypoventilation syndrome (HCC)  Assessment & Plan  Increased pCO2 and HCO3 on ABG    Plan:  · BiPAP at night    Bipolar I disorder (HCC)  Assessment & Plan  Takes fluoxetine 10 mg and quetiapine 25 mg     Plan:  · Continue fluoxetine 10 mg daily    Hyponatremia  Assessment & Plan  Suspected SIADH from diuretic use.     Plan:  · Monitor electrolytes    Fall  Assessment & Plan  Presented to the ED last admission 7/28 due to falls and headstrike. Etiology unclear at the time. Has a previous history of alcohol abuse. Per neuro may reflect Wernicke's. Plan:  · PT/OT   · Refer to A&P for encephalopathy    Chronic diastolic heart failure [facility])  Assessment & Plan  Wt Readings from Last 3 Encounters:   08/05/23 (!) 154 kg (339 lb 8.1 oz)   07/19/23 (!) 140 kg (309 lb 9.6 oz)   06/19/23 (!) 137 kg (302 lb)     Patient takes 20 torsemide daily at home. Echo 6/2/2023 - LVEF 65% Mildly increased wall thickness. Mild concentric hypertrophy. ECG 8/11 showed sinus rhythm with first degree AV block  Dry weight appears to be around 140 kg based on weight at cardiologist on 7/19. Trace pedal edema on exam.     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 Chemotherapy started in March 2023 due to enlarged para-aortic lymph nodes and increase in LDH. Chemo sopped in May 2023 due to toxicity. CTAP 5/9/2023: 2 para-aortic lymph nodes remain minimally increased in size, unchanged. One of these was biopsied and returned back as necrosis without viable tumor in January. Plan:  · Heme/Onc evaluation    Hypertension  Assessment & Plan  Hx of HTN. Only on torsemide at home and has been controlled while inpatient    Plan:  · Holding home torsemide  · IV Lasix 20 mg BID. Transition to oral once evaluated by speech        Please refer to progress note from earlier today for full details. Consults: IP CONSULT TO NEUROLOGY  IP CONSULT TO CASE MANAGEMENT  IP CONSULT TO CASE MANAGEMENT  IP CONSULT TO NUTRITION SERVICES  IP CONSULT TO ONCOLOGY       Sheppard Plan: Sheppard to be removed.  Order has been placed  Central access plan: Port for oncology treatment    PT Recommendations: Post acute rehabilitation services  OT Recommendations: Post acute rehabilitation services    Patient seen and evaluated by Critical Care today and deemed to be appropriate for transfer to Med Surg. Spoke to Scott Hubabrd DO from [facility] to accept transfer. Critical care can be contacted via [employer] with any questions or concerns.

## 2023-08-13 NOTE — ASSESSMENT & PLAN NOTE
Hx of HTN. Only on torsemide at home and has been controlled while inpatient    Plan:  · Holding home torsemide  · IV Lasix 20 mg BID.  Transition to oral once evaluated by speech

## 2023-08-13 NOTE — ASSESSMENT & PLAN NOTE
Presented to the ED last admission 7/28 due to falls and headstrike. Etiology unclear at the time. Has a previous history of alcohol abuse.  Per neuro may reflect Wernicke's.     Plan:  • PT/OT   • Refer to A&P for encephalopathy

## 2023-08-13 NOTE — ASSESSMENT & PLAN NOTE
Takes fluoxetine 10 mg and quetiapine 25 mg at home  Discussed with Dr Annette Gabriel - hold Seroquel till mental status improves     Plan:  • Continue fluoxetine 10 mg daily

## 2023-08-13 NOTE — CONSULTS
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 Because of side effects from the chemotherapeutic agents, the adjuvant chemotherapy regimen was not completed and patient was placed into surveillance. Patient now with encephalopathy, etiology not entirely clear. Patient has been seen by neurology. Work-up/treatment is in process. Recent MRI of the brain and CAT scans did not demonstrate any clear/obvious evidence for germ cell tumor recurrence. To be sure, beta-hCG, LDH and alpha-fetoprotein should be checked. No other oncology work-up needed at this time. Will follow with you. Please do not hesitate to contact me if you have any questions or need additional information. Thank you for this consult.  _________________________________________________________________________________    Chief Complaint   Patient presents with   • Altered Mental Status     Pt from rehab facility - presents with unresponsiveness. Staff reported to EMS that he has been unresponsive since 11am. Pt hasnt been wearing cpap machine, and after attempts to use it on him without improvement - called ems.       Oncology History   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[address])   1/3/2023 Initial Diagnosis    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[address])     3/20/2023 - 5/26/2023 Chemotherapy    alteplase (CATHFLO), 2 mg, Intracatheter, Every 1 Minute as needed, 4 of 4 cycles  Pegfilgrastim-bmez (ZIEXTENZO), 6 mg, Subcutaneous, Once, 4 of 4 cycles  Administration: 6 mg (3/25/2023), 6 mg (4/15/2023), 6 mg (5/6/2023)  CISplatin (PLATINOL) infusion, 20 mg/m2 = 54 mg, Intravenous, Once, 1 of 1 cycle  Administration: 54 mg (3/20/2023), 54 mg (3/21/2023), 54 mg (3/22/2023), 54 mg (3/23/2023), 54 mg (3/24/2023)  aprepitant (CINVANTI) in  mL IVPB, 130 mg, Intravenous, Once, 4 of 4 cycles  Administration: 130 mg (3/20/2023), 130 mg (4/10/2023), 130 mg (5/1/2023), 130 mg (5/22/2023)     5/26/2023 -  Cancer Staged    Staging form: Testis, AJCC 8th Edition  - Clinical: cTis, cN1, cM0, S1 - Signed by Chidi Ellis MD on 5/26/2023        Chemotherapy    alteplase (CATHFLO), 2 mg, Intracatheter, Every 1 Minute as needed, 4 of 4 cycles        Pegfilgrastim-bmez (ZIEXTENZO), 6 mg, Subcutaneous, Once, 4 of 4 cycles    Administration: 6 mg (3/25/2023), 6 mg (4/15/2023), 6 mg (5/6/2023)        CISplatin (PLATINOL) infusion, 20 mg/m2 = 54 mg, Intravenous, Once, 1 of 1 cycle    Administration: 54 mg (3/20/2023), 54 mg (3/21/2023), 54 mg (3/22/2023), 54 mg (3/23/2023), 54 mg (3/24/2023)        aprepitant (CINVANTI) in  mL IVPB, 130 mg, Intravenous, Once, 4 of 4 cycles    Administration: 130 mg (3/20/2023), 130 mg (4/10/2023), 130 mg (5/1/2023), 130 mg (5/22/2023)         Chief complaint/reason for admission: Change in mental status    History of Present Illness: 68-year-old male with history of seminoma, status post orchiectomy and chemotherapy, obesity, hypertension, bipolar depression admitted with the above. Patient was found in bed intubated but awake. Patient presently having NG tube placed. Oncology history: Patient was last seen by Dr. Shaina Barahona on June 19, 2023. Patient initially felt a left testicle mass in June 2021. Patient was seen/evaluated by urology; underwent orchiectomy (December 2022). Tumor markers were negative prior to surgery. Initial stage = T1B Nx cM0, pure seminoma. Patient eventually found to have rising LDH. Lymph node biopsy did not demonstrate a viable tumor although there was significant tissue necrosis. Patient received etoposide and cis-platinum in March 2023 - many side effects so chemotherapy was placed on hold. Patient was then placed on surveillance.     Review of Systems   Unable to perform ROS: Intubated     Patient Active Problem List   Diagnosis   • Umbilical hernia without obstruction and without gangrene   • Tobacco use   • Retroperitoneal lymphadenopathy   • Hypertension   • Urinary hesitancy   • 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HCC)   • Diplopia   • Port-A-Cath in place   • Cerebral gliosis   • Unilateral vestibular schwannoma (HCC)   • Chronic diastolic heart failure (HCC)   • Palpitations   • LVH (left ventricular hypertrophy)   • Pure hypertriglyceridemia   • Fall   • Hyponatremia   • Hypokalemia   • Bipolar I disorder (HCC)   • (HFpEF) heart failure with preserved ejection fraction (HCC)   • Testicular cancer (HCC)   • Obesity hypoventilation syndrome (HCC)   • Constipation   • Wheezing   • Somnolence   • Encephalopathy   • Acute respiratory failure (HCC)   • Catatonic reaction     Past Medical History:   Diagnosis Date   • Anxiety    • Cancer ([address])    • Depression    • Psychiatric disorder     bipolar     Past Surgical History:   Procedure Laterality Date   • IR BIOPSY LYMPH NODE  1/20/2023   • IR PORT PLACEMENT  3/14/2023   • ORCHIECTOMY Left 12/14/2022    Procedure: ORCHIECTOMY INGUINAL;  Surgeon: Shar Jolley MD;  Location: [facility];  Service: Urology     Family History   Problem Relation Age of Onset   • Coronary artery disease Maternal Aunt      Social History     Socioeconomic History   • Marital status: Single     Spouse name: Not on file   • Number of children: Not on file   • Years of education: Not on file   • Highest education level: Not on file   Occupational History   • Not on file   Tobacco Use   • Smoking status: Not on file   • Smokeless tobacco: Never   Vaping Use   • Vaping Use: Never used   Substance and Sexual Activity   • Alcohol use: Not Currently     Comment: 2 cases of beer daily, stopped 3 years ago   • Drug use: Yes     Types: Marijuana     Comment: Medical marijuana   • Sexual activity: Not Currently   Other Topics Concern   • Not on file   Social History Narrative    ** Merged History Encounter **          Social Determinants of Health Financial Resource Strain: Not on file   Food Insecurity: No Food Insecurity (8/12/2023)    Hunger Vital Sign    • Worried About Running Out of Food in the Last Year: Never true    • Ran Out of Food in the Last Year: Never true   Transportation Needs: No Transportation Needs (8/12/2023)    PRAPARE - Transportation    • Lack of Transportation (Medical): No    • Lack of Transportation (Non-Medical):  No   Physical Activity: Not on file   Stress: Not on file   Social Connections: Not on file   Intimate Partner Violence: Not on file   Housing Stability: Low Risk  (8/12/2023)    Housing Stability Vital Sign    • Unable to Pay for Housing in the Last Year: No    • Number of Places Lived in the Last Year: 1    • Unstable Housing in the Last Year: No       Current Facility-Administered Medications:   •  chlorhexidine (PERIDEX) 0.12 % oral rinse 15 mL, 15 mL, Mouth/Throat, Q12H [facility], Tay Eng PA-C, 15 mL at 08/13/23 0843  •  enoxaparin (LOVENOX) subcutaneous injection 40 mg, 40 mg, Subcutaneous, Q12H [facility], Tay Carr PA-C, 40 mg at 08/13/23 0741  •  Famotidine (PF) (PEPCID) injection 20 mg, 20 mg, Intravenous, BID, Demarco Henning PA-C, 20 mg at 08/13/23 5032  •  fentanyl citrate (PF) 100 MCG/2ML 50 mcg, 50 mcg, Intravenous, Q1H PRN, Demarco Henning PA-C, 50 mcg at 08/13/23 0303  •  FLUoxetine (PROzac) capsule 10 mg, 10 mg, Oral, Daily, Brandy Adams DO, 10 mg at 08/13/23 0830  •  furosemide (LASIX) injection 20 mg, 20 mg, Intravenous, BID (diuretic), Jeanne Brown MD  •  modafinil (PROVIGIL) tablet 100 mg, 100 mg, Oral, Daily, Gretchen Forrest MD  •  ondansetron (ZOFRAN) injection 4 mg, 4 mg, Intravenous, Q6H PRN, Demarco Henning PA-C  •  polyethylene glycol (MIRALAX) packet 17 g, 17 g, Oral, Daily PRN, Jeanne Brown MD  •  potassium chloride oral solution 40 mEq, 40 mEq, Oral, BID, Jeanne Brown MD, 40 mEq at 08/13/23 0859  •  senna (SENOKOT) tablet 8.6 mg, 1 tablet, Oral, HS, Ashleigh Ovalle JIMMY Eng, 8.6 mg at 08/12/23 2115  •  thiamine (VITAMIN B1) 500 mg in sodium chloride 0.9 % 50 mL IVPB, 500 mg, Intravenous, TID, Jody Pham DO, Stopped at 08/13/23 0912    No Known Allergies    Vitals:    08/13/23 1200   BP: 110/65   Pulse: 88   Resp: (!) 24   Temp:    SpO2: 98%     Physical Exam  Constitutional:       Appearance: He is well-developed. Comments: Awake, intubated   HENT:      Head: Normocephalic and atraumatic. Right Ear: External ear normal.      Left Ear: External ear normal.   Eyes:      Conjunctiva/sclera: Conjunctivae normal.      Pupils: Pupils are equal, round, and reactive to light. Cardiovascular:      Rate and Rhythm: Normal rate and regular rhythm. Heart sounds: Normal heart sounds. Pulmonary:      Effort: Pulmonary effort is normal.      Breath sounds: Normal breath sounds. Comments: Coarse bilateral rhonchi  Abdominal:      General: Bowel sounds are normal.      Palpations: Abdomen is soft. Musculoskeletal:         General: Normal range of motion. Cervical back: Normal range of motion and neck supple. Skin:     General: Skin is warm. Comments: Relatively good color, warm, moist, no petechiae or ecchymoses   Neurological:      Mental Status: He is alert and oriented to person, place, and time. Deep Tendon Reflexes: Reflexes are normal and symmetric. Psychiatric:         Behavior: Behavior normal.         Thought Content:  Thought content normal.         Judgment: Judgment normal.     Extremities: 0-1+ bilateral lower extremity edema     Labs    8/13/2023 WBC = 7.92 hemoglobin = 11.6 hematocrit = 34.2 platelet = 711 neutrophil = 71% lymphocyte = 17% monocyte = 11% eosinophil = 1% BUN = 8 creatinine = 1.08 calcium = 9.1 AST = 57 ALT = 162 alkaline phosphatase = 112 total bilirubin = 0.55    Imaging    8/12/2023 MRI brain    IMPRESSION:     No acute intracranial abnormality.     Stable nonspecific enhancement along the anterior floor of the third ventricle/hypothalamus when comparing to the March 15, 2023 study. Differential includes chronic toxic metabolic insult (thiamine deficiency), chronic inflammatory/ infectious conditions. Other considerations include Langerhans cell cell histiocytosis, lymphocytic hypophysitis, and neurosyphilis. Primary CNS lymphoma is a less likely consideration, particularly given the lack of interval change. Serologic correlation is recommended.     Stable cerebral white matter signal abnormality, presumably the sequela of chronic microangiopathic disease given the patient's history. 8/11/2023 CAT scan chest abdomen pelvis with contrast    IMPRESSION:     Limited inspiratory volume. Interval improvement in bibasilar atelectatic changes. Hepatic steatosis. Stable borderline left para-aortic adenopathy. Pathology    Case Report   Surgical Pathology Report                         Case: [id number]                                    Authorizing Provider: Javon Witt MD          Collected:           01/20/2023 1050               Ordering Location:     [facility]      Received:            01/20/2023 1104                                      Hospital CAT Scan                                                             Pathologist:           Candelario Gold MD                                                                  Specimen:    Lymph Node, retroperitoneal                                                                Final Diagnosis   A. Lymph Node, retroperitoneal:  - Fibrovascular and lymphoid tissue with extensive necrosis, most consistent with tumor necrosis. - No viable tumor cells present.       Comment: Patient medical history of seminoma is noted. Immunohistochemistry for OCT3/4, , D2-40, AE1/3 are performed on tissue block A4 to help in the assessment of this case and they are non-contributory. CD68 highlights histiocytes.     If clinical indicated, re-biopsy is recommended. Electronically signed by Lisa Rose MD on 1/24/2023 at  9:16 AM       Case Report   Surgical Pathology Report                         Case: [id number]                                    Authorizing Provider: Christine Awad MD    Collected:           12/14/2022 0924               Ordering Location:     [address]      Received:            12/14/2022 [address] [place] [address] Operating Room                                                       Pathologist:           Susie Pastor DO                                                             Specimen:    Testis, Left, LEFT TESTICLE AND SPERMATIC CORD                                             Final Diagnosis   A. Left testicle and spermatic cord, radical orchiectomy:  -Seminoma, grossly measuring 4.5 cm in greatest dimension.  -Largest viable tumor focus measures 0.9 cm in greatest dimension.   -All margins negative for carcinoma.  -Definitive lymph-vascular invasion not identified, see note  -Extensive necrosis and fibrosis present. -SEE SYNOPTIC REPORT     Comment: Tumor cells are positive for OCT3/4 and  and negative for CD45, CK-AE1/AE3, CD30, glypican-3, AFP, Beta-HCG, and inhibin   Electronically signed by Susie Pastor DO on 12/21/2022 at  3:04 PM   Note    Small focus of tumor is present within lymphvascular space without surrounding fibrin or clot. This is favored to represent carryover.     Intradepartmental consultation (AT) is in agreement  Dr. Christine Awad was notified by Dr. Susie Pastor via [employer] at 15:03 on 12/21/2022.    Additional Information    All reported additional testing was performed with appropriately reactive controls.  These tests were developed and their performance characteristics determined by [facility] or appropriate performing facility, though some tests may be performed on tissues which have not been validated for performance characteristics (such as staining performed on alcohol exposed cell blocks and decalcified tissues).  Results should be interpreted with caution and in the context of the patients’ clinical condition. These tests may not be cleared or approved by the U.S. Food and Drug Administration, though the FDA has determined that such clearance or approval is not necessary. These tests are used for clinical purposes and they should not be regarded as investigational or for research. This laboratory has been approved by [place] [address], designated as a high-complexity laboratory and is qualified to perform these tests.     Interpretation performed at [facility], [address] [place] [address], [address]   Synoptic Checklist   TESTIS: Radical Orchiectomy  8th Edition - Protocol posted: 11/10/2021  TESTIS: RADICAL ORCHIECTOMY - All Specimens       CLINICAL   Pre-Orchiectomy Serum Tumor Markers  Serum marker studies within normal limits    Post-Orchiectomy Serum Tumor Markers  Unknown    Serum Tumor Markers (S)  S0 (serum marker study levels within normal limits)    SPECIMEN   Specimen Laterality  Left    TUMOR   Tumor Focality  Unifocal    Tumor Size  Greatest dimension of main tumor mass (Centimeters): 4.5 cm   Histologic Type  Seminoma    Tumor Extent  Limited to testis    Lymphovascular Invasion  Not identified    MARGINS   Margin Status  All margins negative for tumor    REGIONAL LYMPH NODES   Regional Lymph Node Status  Not applicable (no regional lymph nodes submitted or found)    PATHOLOGIC STAGE CLASSIFICATION (pTNM, AJCC 8th Edition)   Reporting of pT, pN, and (when applicable) pM categories is based on information available to the pathologist at the time the report is issued. As per the AJCC (Chapter 1, 8th Ed.) it is the managing physician’s responsibility to establish the final pathologic stage based upon all pertinent information, including but potentially not limited to this pathology report.    pT Category  pT1b    pN Category  pN not assigned (no nodes submitted or found)    . Gross Description    A. The specimen is received in formalin, labeled with the patient's name and hospital number, and is designated "left testicle and spermatic cord." It consists of a 99.0 g (after fixation) left testicle to include: epididymis and spermatic cord. The spermatic cord is tan, contains attached lobules of adipose tissue, is focally shaggy and measures 5.7 cm in length by 2.1 cm in average diameter. Sectioning the spermatic cord reveals grossly unremarkable cut surfaces. No lymph node candidates are identified in the attached soft tissue.     The testicle measures 5.2 x 4.2 x 3.5 cm. The tunica vaginalis is tan-purple, rubbery, stretched, easily movable, and contains a small amount of attached soft tissue. No lymph node candidates are grossly identified within the attached soft tissue. The testicle is bivalved to reveal fluid trapped between the tunica albuginea and the tunica vaginalis. The tunica albuginea is tan-white, intact, and glistening. Occupying the entire testicle is a 4.5 x 3.8 x 1.5 cm tan-yellow, soft, and multi-nodular mass. The mass abuts the tunica albuginea, is 0.4 cm from the closest tunica vaginalis, 0.3 cm from the epididymis, and more than 3.0 cm from the spermatic cord resection margin. Sectioning the mass reveals tan-yellow and soft cut surfaces. The mass appears to be grossly confined to the epididymis. No uninvolved testicular parenchyma is grossly identified.     A C-shaped and distorted epididymis is present and measures 5.5 x 1.3 cm. Sectioning the epidiymis reveals tan and lobulated cut surfaces.      Digital images are taken. Representative sections to include more than one section per cm of mass are submitted as follows:     A1: Spermatic cord resection margin, en face (away from the testicle)  A2:  Midportion of the spermatic cord  A3: Distal portion of the spermatic cord (adjacent to the testicle)  A4: Full-thickness section of mass to include the closest tunica albuginea and the closest tunica vaginalis  A5-A6: Full-thickness sections of mass to include the tunicae  A7-A8: Mass to demonstrate its relationship to the closest epididymis  A9-A10: Additional full-thickness sections of mass  A11:  Thickened tunica vaginalis     Note: The estimated total formalin fixation time based upon information provided by the submitting clinician and the standard processing

## 2023-08-13 NOTE — ASSESSMENT & PLAN NOTE
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 Chemotherapy started in March 2023 due to enlarged para-aortic lymph nodes and increase in LDH. Chemo sopped in May 2023 due to toxicity. CTAP 5/9/2023: 2 para-aortic lymph nodes remain minimally increased in size, unchanged. One of these was biopsied and returned back as necrosis without viable tumor in January.     Plan:  · Heme/Onc evaluation

## 2023-08-13 NOTE — RESPIRATORY THERAPY NOTE
RT Ventilator Management Note  Meli Bartlett 41 y.o. male MRN: [id number]  Unit/Bed#: ICU 12 Encounter: [id number]       08/13/23 0730   Respiratory Assessment   Resp Comments Initiated wean at this hour when Med Olya Ricci was assessing pt and observed pt following all of his requests. On settings of  6 / 6 and 50% FiO2 (reduced from 60% for this wean), pt immediately began to spontaneously breath moving 356 ml's on that setting and a Vt of 400 ml's with PS of 10 cm. Kept settings on 10 / 8 and 50%. By 0745 AM, pt's RR was 22 and no apnea alarms sounding. Will continue to monitor and pt's nurse Giovany Key aware.    O2 Device PB 7200   Vent Information   Vent ID [id number]   Vent type     Vent Mode CPAP/PS Spont   $ Pulse Oximetry Spot Check Charge Completed   CPAP/PS Spont Settings   FIO2 (%) 50 %   PEEP (cmH2O) 6 cmH2O   Pressure Support (cmH2O) 10 cmH20   Trigger Sensitivity Flow (lpm) 3 LPM   Rise Time (%) 50 %   Esens % 25 %   Humidification Heater   Heater Temp 97.9 °F (36.6 °C)   CPAP/PS Spont Actuals   Resp Rate (BPM) 22 BPM   VT (mL) 450 mL   MV (Obs) 10.2   MAP (cmH2O) 9.2 cmH2O   Peak Pressure (cmH2O) 17 cmH2O   I/E Ratio (Obs) 1:2.9   RSBI 46   Heater Temperature (Obs) 97.9 °F (36.6 °C)   CPAP/PS Spont Alarms   High Peak Pressure (cmH20) 40 cmH2O   High Resp Rate (BPM) 42 BPM   High MV (L/min) 25 L/min   Low MV (L/min) 4 L/min   High Nate VTE (mL) 810 mL   Low Nate VTE (mL) 345 mL   High SPONT VTE (mL) 810 mL   Low Spont VTE (mL) 345 mL   CPAP/PS Spont Apnea Settings   Resp Rate (BPM) 17 BPM   VT (mL) 450 mL   FIO2 (%) 100 %   Apnea Time (s) 20 S   Apnea Flow (LPM) 60 LPM   Maintenance   Alarm (pink) cable attached Yes   Resuscitation bag with peep valve at bedside Yes   Water bag changed No   Circuit changed No   Daily Screen   Patient safety screen outcome: Passed   Spont breathing trial % for 30 min Yes   RSBI 55   IHI Ventilator Associated Pneumonia Bundle   Daily Awakening Trials Performed Yes   Daily Assessment of Readiness to Extubate Yes   Head of Bed Elevated HOB 30   ETT  Cuffed; Hi-Lo 8 mm   Placement Date/Time: 08/12/23 (c) 2171   Type: Cuffed; Hi-Lo  Tube Size: 8 mm  Location: Oral  Insertion attempts: 1  Placement Verification: Chest x-ray;End tidal CO2;Symmetrical chest wall movement  Secured at (cm): 23CM   Secured at (cm) 26   Measured from [address] by Commercial tube larson   Site Condition Dry   Cuff Pressure (cm H2O)   (green)   HI-LO Suction  Intermittent suction   HI-LO Secretions Scant   HI-LO Intervention Patent         Daily Screen         8/12/2023  [id number] 8/13/2023  0730          Patient safety screen outcome[de-identified] Failed Passed      Not Ready for Weaning due to[de-identified] Underline problem not resolved --      Spont breathing trial % for 30 min: -- Yes      RSBI: -- 55                Physical Exam:   Assessment Type: Assess only  Bilateral Breath Sounds: Clear  O2 Device: PB 7200      Resp Comments: Initiated wean at this hour when Med Student Dallas Stewart was assessing pt and observed pt following all of his requests. On settings of  6 / 6 and 50% FiO2 (reduced from 60% for this wean), pt immediately began to spontaneously breath moving 356 ml's on that setting and a Vt of 400 ml's with PS of 10 cm. Kept settings on 10 / 8 and 50%. By 0745 AM, pt's RR was 22 and no apnea alarms sounding. Will continue to monitor and pt's nurse Toña Arcos aware.

## 2023-08-13 NOTE — ASSESSMENT & PLAN NOTE
Patient with history of HFpEF, seminomatous testicular cancer, EtOH use, presented from [address] [place] [address] on 8/11 with worsening somnolence. He was recently hospitalized from 7/28 - 8/5/2023 due to frequent falls and CHF exacerbation. Initially admitted to med/surg but ICU called due to low GCS, and patient was intubated for airway protection. He was extubated the morning of 8/13 due to improving mental status. CSF studies have been mostly unrevealing. Per neurology, encephalopathy may be Wernicke's due to improvement after being given high dose thiamine replacement. Other potential causes include narcolepsy due to decreased sleep latency and increased REM on EEG. Less likely autoimmune or psychogenic. • 8/12 MRI - Stable nonspecific enhancement along the anterior floor of third ventricle/hyopthalamus.      • Plan:   · Q4 neuro checks  · vEEG   · Continue IV thiamine 500 mg TID x5 days then transition to oral   · Modafinil 100 mg daily   · F/u CSF studies - ENS2, VDRL, orexin pending  · F/u urine toxicology screen - was taken after patient given fentanyl

## 2023-08-13 NOTE — ASSESSMENT & PLAN NOTE
Presented to the ED last admission 7/28 due to falls and headstrike. Etiology unclear at the time. Has a previous history of alcohol abuse. Per neuro may reflect Wernicke's.     Plan:  · PT/OT   · Refer to A&P for encephalopathy

## 2023-08-13 NOTE — ASSESSMENT & PLAN NOTE
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 Chemotherapy started in March 2023 due to enlarged para-aortic lymph nodes and increase in LDH. Chemo sopped in May 2023 due to toxicity. · CTAP 5/9/2023: 2 para-aortic lymph nodes remain minimally increased in size, unchanged. One of these was biopsied and returned back as necrosis without viable tumor in January.     Plan:  · Heme/Onc evaluation

## 2023-08-13 NOTE — ASSESSMENT & PLAN NOTE
Patient with history of HFpEF, seminomatous testicular cancer, EtOH use, presented from [address] [place] [address] on 8/11 with worsening somnolence. He was recently hospitalized from 7/28 - 8/5/2023 due to frequent falls and CHF exacerbation. Initially admitted to med/surg but ICU called due to low GCS, and patient was intubated for airway protection. He was extubated the morning of 8/13 due to improving mental status. CSF studies have been mostly unrevealing. Per neurology, encephalopathy may be Wernicke's due to improvement after being given high dose thiamine replacement. Other potential causes include narcolepsy due to decreased sleep latency and increased REM on EEG. Less likely autoimmune or psychogenic. • 8/12 MRI - Stable nonspecific enhancement along the anterior floor of third ventricle/hyopthalamus.    • Video EEG - no seizures  • Discussed with Dr Nasrin Arevalo - Modafinil discontinued today, monitor    • Plan:   · Q4 neuro checks  · Continue IV thiamine 500 mg TID x5 days then transition to oral   · F/u CSF studies

## 2023-08-14 ENCOUNTER — APPOINTMENT (INPATIENT)
Dept: RADIOLOGY | Facility: HOSPITAL | Age: 38
DRG: 421 | End: 2023-08-14
Payer: COMMERCIAL

## 2023-08-14 LAB
ALBUMIN SERPL BCP-MCNC: 3.2 G/DL (ref 3.5–5)
ALP SERPL-CCNC: 108 U/L (ref 46–116)
ALT SERPL W P-5'-P-CCNC: 140 U/L (ref 12–78)
ANION GAP SERPL CALCULATED.3IONS-SCNC: 4 MMOL/L
AST SERPL W P-5'-P-CCNC: 43 U/L (ref 5–45)
BASOPHILS # BLD AUTO: 0.04 THOUSANDS/ÂΜL (ref 0–0.1)
BASOPHILS # BLD AUTO: 0.05 THOUSANDS/ÂΜL (ref 0–0.1)
BASOPHILS NFR BLD AUTO: 1 % (ref 0–1)
BASOPHILS NFR BLD AUTO: 1 % (ref 0–1)
BILIRUB SERPL-MCNC: 0.66 MG/DL (ref 0.2–1)
BUN SERPL-MCNC: 10 MG/DL (ref 5–25)
CA-I BLD-SCNC: 1.15 MMOL/L (ref 1.12–1.32)
CALCIUM ALBUM COR SERPL-MCNC: 10 MG/DL (ref 8.3–10.1)
CALCIUM SERPL-MCNC: 9.4 MG/DL (ref 8.3–10.1)
CHLORIDE SERPL-SCNC: 99 MMOL/L (ref 96–108)
CO2 SERPL-SCNC: 33 MMOL/L (ref 21–32)
CREAT SERPL-MCNC: 0.94 MG/DL (ref 0.6–1.3)
EOSINOPHIL # BLD AUTO: 0.09 THOUSAND/ÂΜL (ref 0–0.61)
EOSINOPHIL # BLD AUTO: 0.14 THOUSAND/ÂΜL (ref 0–0.61)
EOSINOPHIL NFR BLD AUTO: 1 % (ref 0–6)
EOSINOPHIL NFR BLD AUTO: 2 % (ref 0–6)
ERYTHROCYTE [DISTWIDTH] IN BLOOD BY AUTOMATED COUNT: 14.4 % (ref 11.6–15.1)
ERYTHROCYTE [DISTWIDTH] IN BLOOD BY AUTOMATED COUNT: 14.4 % (ref 11.6–15.1)
FUNGUS SPEC CULT: NORMAL
GFR SERPL CREATININE-BSD FRML MDRD: 103 ML/MIN/1.73SQ M
GLUCOSE SERPL-MCNC: 101 MG/DL (ref 65–140)
HCT VFR BLD AUTO: 34.4 % (ref 36.5–49.3)
HCT VFR BLD AUTO: 34.9 % (ref 36.5–49.3)
HGB BLD-MCNC: 11.5 G/DL (ref 12–17)
HGB BLD-MCNC: 11.7 G/DL (ref 12–17)
IMM GRANULOCYTES # BLD AUTO: 0.04 THOUSAND/UL (ref 0–0.2)
IMM GRANULOCYTES # BLD AUTO: 0.04 THOUSAND/UL (ref 0–0.2)
IMM GRANULOCYTES NFR BLD AUTO: 1 % (ref 0–2)
IMM GRANULOCYTES NFR BLD AUTO: 1 % (ref 0–2)
LYMPHOCYTES # BLD AUTO: 1.43 THOUSANDS/ÂΜL (ref 0.6–4.47)
LYMPHOCYTES # BLD AUTO: 1.63 THOUSANDS/ÂΜL (ref 0.6–4.47)
LYMPHOCYTES NFR BLD AUTO: 16 % (ref 14–44)
LYMPHOCYTES NFR BLD AUTO: 21 % (ref 14–44)
MAGNESIUM SERPL-MCNC: 2.2 MG/DL (ref 1.6–2.6)
MCH RBC QN AUTO: 32.9 PG (ref 26.8–34.3)
MCH RBC QN AUTO: 33 PG (ref 26.8–34.3)
MCHC RBC AUTO-ENTMCNC: 33.4 G/DL (ref 31.4–37.4)
MCHC RBC AUTO-ENTMCNC: 33.5 G/DL (ref 31.4–37.4)
MCV RBC AUTO: 98 FL (ref 82–98)
MCV RBC AUTO: 99 FL (ref 82–98)
MONOCYTES # BLD AUTO: 0.9 THOUSAND/ÂΜL (ref 0.17–1.22)
MONOCYTES # BLD AUTO: 0.94 THOUSAND/ÂΜL (ref 0.17–1.22)
MONOCYTES NFR BLD AUTO: 10 % (ref 4–12)
MONOCYTES NFR BLD AUTO: 12 % (ref 4–12)
MRSA NOSE QL CULT: NORMAL
NEUTROPHILS # BLD AUTO: 4.97 THOUSANDS/ÂΜL (ref 1.85–7.62)
NEUTROPHILS # BLD AUTO: 6.26 THOUSANDS/ÂΜL (ref 1.85–7.62)
NEUTS SEG NFR BLD AUTO: 64 % (ref 43–75)
NEUTS SEG NFR BLD AUTO: 70 % (ref 43–75)
NRBC BLD AUTO-RTO: 0 /100 WBCS
NRBC BLD AUTO-RTO: 0 /100 WBCS
PHOSPHATE SERPL-MCNC: 4.4 MG/DL (ref 2.7–4.5)
PLATELET # BLD AUTO: 253 THOUSANDS/UL (ref 149–390)
PLATELET # BLD AUTO: 263 THOUSANDS/UL (ref 149–390)
PMV BLD AUTO: 9.2 FL (ref 8.9–12.7)
PMV BLD AUTO: 9.3 FL (ref 8.9–12.7)
POTASSIUM SERPL-SCNC: 3.7 MMOL/L (ref 3.5–5.3)
PROCALCITONIN SERPL-MCNC: 0.09 NG/ML
PROT SERPL-MCNC: 6.8 G/DL (ref 6.4–8.4)
RBC # BLD AUTO: 3.48 MILLION/UL (ref 3.88–5.62)
RBC # BLD AUTO: 3.56 MILLION/UL (ref 3.88–5.62)
SODIUM SERPL-SCNC: 136 MMOL/L (ref 135–147)
WBC # BLD AUTO: 7.71 THOUSAND/UL (ref 4.31–10.16)
WBC # BLD AUTO: 8.82 THOUSAND/UL (ref 4.31–10.16)

## 2023-08-14 PROCEDURE — 80053 COMPREHEN METABOLIC PANEL: CPT

## 2023-08-14 PROCEDURE — 99233 SBSQ HOSP IP/OBS HIGH 50: CPT | Performed by: INTERNAL MEDICINE

## 2023-08-14 PROCEDURE — 92526 ORAL FUNCTION THERAPY: CPT

## 2023-08-14 PROCEDURE — 99222 1ST HOSP IP/OBS MODERATE 55: CPT

## 2023-08-14 PROCEDURE — 83735 ASSAY OF MAGNESIUM: CPT | Performed by: STUDENT IN AN ORGANIZED HEALTH CARE EDUCATION/TRAINING PROGRAM

## 2023-08-14 PROCEDURE — 97163 PT EVAL HIGH COMPLEX 45 MIN: CPT

## 2023-08-14 PROCEDURE — 94664 DEMO&/EVAL PT USE INHALER: CPT

## 2023-08-14 PROCEDURE — 94760 N-INVAS EAR/PLS OXIMETRY 1: CPT

## 2023-08-14 PROCEDURE — 99232 SBSQ HOSP IP/OBS MODERATE 35: CPT | Performed by: PSYCHIATRY & NEUROLOGY

## 2023-08-14 PROCEDURE — 82330 ASSAY OF CALCIUM: CPT | Performed by: STUDENT IN AN ORGANIZED HEALTH CARE EDUCATION/TRAINING PROGRAM

## 2023-08-14 PROCEDURE — 84100 ASSAY OF PHOSPHORUS: CPT | Performed by: STUDENT IN AN ORGANIZED HEALTH CARE EDUCATION/TRAINING PROGRAM

## 2023-08-14 PROCEDURE — 85025 COMPLETE CBC W/AUTO DIFF WBC: CPT | Performed by: INTERNAL MEDICINE

## 2023-08-14 PROCEDURE — 71045 X-RAY EXAM CHEST 1 VIEW: CPT

## 2023-08-14 PROCEDURE — 84145 PROCALCITONIN (PCT): CPT | Performed by: INTERNAL MEDICINE

## 2023-08-14 PROCEDURE — 85025 COMPLETE CBC W/AUTO DIFF WBC: CPT

## 2023-08-14 RX ORDER — SENNOSIDES 8.6 MG
2 TABLET ORAL 2 TIMES DAILY
Status: DISCONTINUED | OUTPATIENT
Start: 2023-08-14 | End: 2023-08-18 | Stop reason: HOSPADM

## 2023-08-14 RX ORDER — POLYETHYLENE GLYCOL 3350 17 G/17G
17 POWDER, FOR SOLUTION ORAL DAILY
Status: DISCONTINUED | OUTPATIENT
Start: 2023-08-14 | End: 2023-08-18 | Stop reason: HOSPADM

## 2023-08-14 RX ADMIN — FAMOTIDINE 20 MG: 10 INJECTION INTRAVENOUS at 08:38

## 2023-08-14 RX ADMIN — FAMOTIDINE 20 MG: 10 INJECTION INTRAVENOUS at 18:18

## 2023-08-14 RX ADMIN — ENOXAPARIN SODIUM 40 MG: 40 INJECTION SUBCUTANEOUS at 20:53

## 2023-08-14 RX ADMIN — POLYETHYLENE GLYCOL 3350 17 G: 17 POWDER, FOR SOLUTION ORAL at 08:38

## 2023-08-14 RX ADMIN — SENNOSIDES 17.2 MG: 8.6 TABLET, FILM COATED ORAL at 18:18

## 2023-08-14 RX ADMIN — CHLORHEXIDINE GLUCONATE 15 ML: 1.2 SOLUTION ORAL at 08:38

## 2023-08-14 RX ADMIN — THIAMINE HYDROCHLORIDE 500 MG: 100 INJECTION, SOLUTION INTRAMUSCULAR; INTRAVENOUS at 18:18

## 2023-08-14 RX ADMIN — FUROSEMIDE 20 MG: 10 INJECTION, SOLUTION INTRAMUSCULAR; INTRAVENOUS at 18:18

## 2023-08-14 RX ADMIN — ENOXAPARIN SODIUM 40 MG: 40 INJECTION SUBCUTANEOUS at 08:38

## 2023-08-14 RX ADMIN — FLUOXETINE 10 MG: 10 CAPSULE ORAL at 08:39

## 2023-08-14 RX ADMIN — POTASSIUM CHLORIDE 40 MEQ: 1.5 SOLUTION ORAL at 08:38

## 2023-08-14 RX ADMIN — SENNOSIDES 17.2 MG: 8.6 TABLET, FILM COATED ORAL at 08:38

## 2023-08-14 RX ADMIN — MODAFINIL 100 MG: 100 TABLET ORAL at 08:38

## 2023-08-14 RX ADMIN — FUROSEMIDE 20 MG: 10 INJECTION, SOLUTION INTRAMUSCULAR; INTRAVENOUS at 08:38

## 2023-08-14 RX ADMIN — THIAMINE HYDROCHLORIDE 500 MG: 100 INJECTION, SOLUTION INTRAMUSCULAR; INTRAVENOUS at 08:48

## 2023-08-14 NOTE — PROGRESS NOTES
NEUROLOGY RESIDENCY PROGRESS NOTE     Name: Hannah Dee   Age & Sex: 41 y.o. male   MRN: [id number]  Unit/Bed#: [facility] 704-01   Encounter: [id number]    Hnanah Dee will need follow up in in 6 weeks with sleep medicine. He will not require outpatient neurological testing. 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 Neurology consulted for encephalopathy. Pt was sent from acute rehab facility d/t AMS and unresponsiveness. Patient was admitted to the medical floor and CC was called to evaluate patient as DI alert was initiated, GCS was 5 and patient was intubated and transferred to the Neuro ICU. Hematology oncology note from 6/19/2023 reports details of testicular seminoma diagnosis and treatment, with mention of stage II disease and elevation of LDH suggestive of metastatic disease.  Repeat biopsy of the lymph node was without evidence of tumor however did demonstrate necrosis which was noted to be seen in seminoma.  Was mentioned to start EP (specific chemo regimen) in March 2023 however had to discontinue cisplatin due to neuropathy symptoms.  It seems chemotherapy was later discontinued due to double vision, weakness on the right side and labile affect. Work Up:  · [address] 8/11 was unremarkable. · CT CAP 8/11 was unremarkable the exception of stable left para-aortic adenopathy, known to Oncology.    · MRI Brain 8/12: Demonstrated enhancement along the anterior floor of the third ventricle/hypothalamus/mammillary bodies which was noted to be stable compared to MRI from March 15, 2023.  Also demonstrated nonspecific scattered and punctate T2 signal abnormalities. · VEEG: abundance REM sleep with diminished latency. No prior diagnosis. · MRI brain 6/12/2023: Was noted to have stable 2 to 3 mm nodular enhancement within the IACs bilaterally (vestibular schwannomas?)  LABS:  · Abnormal: Transaminitis Elevated AST/ALT  · Pending: Lyme PCR, thiamine, AI encephalitis/encephalopathy paraneoplastic panel ENC2 & ENS2, CSF VDRL  · Normal: B12, folate, TSH, CSF cells, CSF protein, CSF ME panel, CSF Gram stain, CSF cryptococcal antigen,    IMPRESSION: Subacute profound encephalopathy, improving. Unclear etiology differential diagnosis at this time includes the following: Most likely 2/2 to Wernicke Encephalopathy - given ataxia with recurrent falls, MRI findings, double vision per onc note, and progressive encephalopathy and clinical improvement after 24-48 hrs of high dose thiamine replacement versus Narcolepsy - vEEG demonstrated abundance REM sleep with diminished latency. No prior diagnosis versus Autoimmune/Paraneoplastic: Less likely given improvement without empiric treatment, and Phsycogenic/Functional - Less likely given MRI findings, reported apnea when sleeping, however if b1 normal conversion disorder in the setting of psychological stressors of metastatic cancer is a potential etiology however diagnosis of exclusion. PLAN:  · vEEG discontinued. Monitor clinically   · Started of Modafinil 100 mg daily  · Continue Thiamine 500 mg IV TID for total of 5 days (current 3/5), followed by daily oral supplementation  · Follow up Thiamine Level  · ORXNA (Orexin-A/Hypocretin-1) lab to be sent on CSF.    · Note* only 0.5 ml CSF left, [place] site requests 1.5 ml for test, spoke to lab who will try to send out anyway  · [facility] and ENS2 labs sent, pending   · Follow up Urine Toxicology Screen (pt received fentanyl prior to lab collection)   · Would hold off on any AEDs or benzodiazepines at this time given patient is able to appropriately follow commands  · Agree with having psychiatry evaluate patient for possible catatonia/functional neurologic disorder  · Will need outpatient referral for sleep medicine   · Rest of care per primary  SUBJECTIVE   Patient was seen and examined. No acute events overnight. Patient successfully extubated yesterday. Clinically patient continues to improve from alertness standpoint. Pt resting comfortably in bed during evaluation, saturating appropriately on RA. Patient alert during evaluation this morning. Reports he does not know why he is currently being hospitalized or cannot recall any of the events over the last couple days. Patient states he does not remember being acute rehab. Patient reserved and noted to have operative speech even with persuasion. Patient denies feeling fatigued or somnolent. States overall feels at baseline. Reports chronic monocular diplopia and bilateral blurry vision that is chronic side effect of prior chemotherapy. Patient also reports presence of chronic bilateral tinnitus. Pt endorses that overall mood normal and denies increased depression or anxiety. Pertinent Negatives include: numbness, weakness, speech or visual changes, headaches, seizures, amaurosis, diplopia, other visual changes, paralysis/weakness, numbness or tingling, involuntary movements, tremor, speech impairment. Review of Systems   HENT: Positive for tinnitus. Eyes: Positive for visual disturbance. Blurred vision, diplopia    All other systems reviewed and are negative. OBJECTIVE   Patient ID: Lois Mendes is a 41 y.o. male.     Vitals:    08/14/23 0730 08/14/23 0800 08/14/23 0830 08/14/23 1016   BP: 114/73  102/64 112/72   Pulse: 80 68 88 76   Resp: 21 20 (!) 23    Temp:  98.9 °F (37.2 °C)  98.4 °F (36.9 °C)   TempSrc:  Oral     SpO2: 97% 96% 92% 91%      Temperature:   Temp (24hrs), Av.1 °F (37.3 °C), Min:98.4 °F (36.9 °C), Max:100.3 °F (37.9 °C)    Temperature: 98.4 °F (36.9 °C)    PHYSICAL EXAM  General: Not in acute distress. Obese. Toxic appearing. HEENT: Normocephalic atraumatic  Cardiovascular: Rate and rhythm - normal rate, regular rhythm  Pulmonary: Normal effort. No respiratory distress. Musculoskeletal: Bilateral pedal edema    NEUROLOGICAL EXAM  Mental status: Alert and oriented to person, place, month and year but not date, not situation. Attention/ concentration intact. Follows basic commands. Able to name objects. Patient reports issues with acute memory stating that he does not remember being at rehab facility. Speech slowed, volume weak, poverty of speech. Affect reserved and slowed. Cranial nerves:   I: smell Not tested   II: visual fields Full to confrontation   II: pupils Equal, round, reactive to light   III,VII: ptosis None   III,IV,VI: extraocular muscles  Full ROM, bilateral end gaze nystagmus   V: mastication Normal   V: facial light touch sensation  Normal   V,VII: corneal reflex  Present   VII: facial muscle function - upper  Normal   VII: facial muscle function - lower Normal   VIII: hearing Not tested   IX: soft palate elevation  Normal   IX,X: gag reflex Present   XI: trapezius strength  5/5   XI: sternocleidomastoid strength 5/5   XI: neck flexion strength  5/5   XII: tongue strength  Normal   Sensory: Grossly intact  Motor: Mild increased tone in B/L upper and lower extremities. Moving all 4 extremities purposefully. Strength 5/5 throughout. Reflexes: DTRs 1+   Coordination: Normal Finger-Nose bilaterally  Gait: Not assessed secondary to patient's clinical status  LABORATORY DATA   Labs: I have personally reviewed pertinent reports.     Results from last 7 days   Lab Units 23  0541 23  0748 23  0534   WBC Thousand/uL 7.71 7.92 5.97   HEMOGLOBIN g/dL 11.5* 11.6* 11.7*   HEMATOCRIT % 34.4* 34.2* 34.2*   PLATELETS Thousands/uL 253 277 253   NEUTROS PCT % 64 71 72   MONOS PCT % 12 11 9   EOS PCT % 1 1 1      Results from last 7 days   Lab Units 08/14/23  0541 08/13/23  1457 08/13/23  0748 08/12/23  0534   SODIUM mmol/L 136 132* 134* 134*   POTASSIUM mmol/L 3.7 4.4 3.4* 3.2*   CHLORIDE mmol/L 99 97 97 96   CO2 mmol/L 33* 32 32 31   BUN mg/dL 10 9 8 6   CREATININE mg/dL 0.94 1.03 1.08 0.67   CALCIUM mg/dL 9.4 9.5 9.1 8.6   ALK PHOS U/L 108  --  112 102   ALT U/L 140*  --  162* 142*   AST U/L 43  --  57* 64*     Results from last 7 days   Lab Units 08/14/23  0541 08/12/23  0534   MAGNESIUM mg/dL 2.2 2.1     Results from last 7 days   Lab Units 08/14/23  0541 08/12/23  0534   PHOSPHORUS mg/dL 4.4 3.6      Results from last 7 days   Lab Units 08/11/23  1656   LACTIC ACID mmol/L 1.1     IMAGING & DIAGNOSTIC TESTING     Radiology Results: I have personally reviewed pertinent reports. MRI brain w wo contrast   Final Result by Carl Vale MD (08/12 0950)      No acute intracranial abnormality. Stable nonspecific enhancement along the anterior floor of the third ventricle/hypothalamus when comparing to the March 15, 2023 study. Differential includes chronic toxic metabolic insult (thiamine deficiency), chronic inflammatory/ infectious    conditions. Other considerations include Langerhans cell cell histiocytosis, lymphocytic hypophysitis, and neurosyphilis. Primary CNS lymphoma is a less likely consideration, particularly given the lack of interval change. Serologic correlation is    recommended. Stable cerebral white matter signal abnormality, presumably the sequela of chronic microangiopathic disease given the patient's history. The study was marked in EPIC for significant notification. Workstation performed: [id number]         XR chest portable ICU   Final Result by Lee Freeman MD (08/12 1020)      ET tube 7 cm above the prashant. Persistent bibasilar atelectasis. Workstation performed: [id number]         CT head without contrast   Final Result by Fan Ugalde MD (08/11 2026)      No acute intracranial abnormality. Stable small bilateral subcortical hypoattenuating foci. No associated mass effect. Workstation performed: [id number]         CT chest abdomen pelvis w contrast   Final Result by Fan Ugalde MD (08/11 2037)      Limited inspiratory volume. Interval improvement in bibasilar atelectatic changes. Hepatic steatosis. Stable borderline left para-aortic adenopathy. Workstation performed: [id number]             Other Diagnostic Testing: I have personally reviewed pertinent reports. ACTIVE MEDICATIONS     Current Facility-Administered Medications   Medication Dose Route Frequency   • [MAR Hold] chlorhexidine (PERIDEX) 0.12 % oral rinse 15 mL  15 mL Mouth/Throat Q12H [facility]   • [MAR Hold] enoxaparin (LOVENOX) subcutaneous injection 40 mg  40 mg Subcutaneous Q12H [facility]   • [MAR Hold] Famotidine (PF) (PEPCID) injection 20 mg  20 mg Intravenous BID   • [MAR Hold] fentanyl citrate (PF) 100 MCG/2ML 50 mcg  50 mcg Intravenous Q1H PRN   • [MAR Hold] FLUoxetine (PROzac) capsule 10 mg  10 mg Oral Daily   • [MAR Hold] furosemide (LASIX) injection 20 mg  20 mg Intravenous BID (diuretic)   • [MAR Hold] modafinil (PROVIGIL) tablet 100 mg  100 mg Oral Daily   • [MAR Hold] ondansetron (ZOFRAN) injection 4 mg  4 mg Intravenous Q6H PRN   • [MAR Hold] polyethylene glycol (MIRALAX) packet 17 g  17 g Oral Daily   • [MAR Hold] senna (SENOKOT) tablet 17.2 mg  2 tablet Oral BID   • [MAR Hold] thiamine (VITAMIN B1) 500 mg in sodium chloride 0.9 % 50 mL IVPB  500 mg Intravenous TID       Prior to Admission medications    Medication Sig Start Date End Date Taking?  Authorizing Provider   albuterol (ProAir HFA) 90 mcg/act inhaler Inhale 2 puffs every 6 (six) hours as needed for wheezing 12/14/22   Reese Garrison,    albuterol (PROVENTIL HFA,VENTOLIN HFA) 90 mcg/act inhaler Inhale 2 puffs every 4 (four) hours as needed for wheezing    Historical Provider, MD   Enoxaparin Sodium (LOVENOX SC) Inject 40 mg under the skin every 12 (twelve) hours    Historical Provider, MD   FLUoxetine (PROzac) 10 mg capsule Take 10 mg by mouth daily    Historical Provider, MD   potassium chloride (K-DUR,KLOR-CON) 20 mEq tablet Take 2 tablets (40 mEq total) by mouth daily Do not start before August 6, 2023.   Patient taking differently: Take 40 mEq by mouth 2 (two) times a day before breakfast and lunch 8/6/23   Rupali Greenberg MD   QUEtiapine (SEROquel) 25 mg tablet Take 25 mg by mouth daily at bedtime    Historical Provider, MD   senna-docusate sodium (SENOKOT S) 8.6-50 mg per tablet Take 1 tablet by mouth daily at bedtime 8/5/23   Rupali Greenberg MD   torsemide [facility]) 20 mg tablet Take 1 tablet (20 mg total) by mouth daily 5/17/23   Rodolfo Reid MD     VTE Pharmacologic Prophylaxis: Enoxaparin (Lovenox)  VTE Mechanical Prophylaxis: sequential compression device  ==  DO Ruben Gomez Neurology Residency, PGY-2

## 2023-08-14 NOTE — PLAN OF CARE
Problem: PHYSICAL THERAPY ADULT  Goal: Performs mobility at highest level of function for planned discharge setting. See evaluation for individualized goals. Description: Treatment/Interventions: Functional transfer training, LE strengthening/ROM, Therapeutic exercise, Elevations, Endurance training, Cognitive reorientation, Patient/family training, Equipment eval/education, Bed mobility, Gait training, Spoke to nursing, Spoke to case management, OT, ST, Family  Equipment Recommended:  (TBD)       See flowsheet documentation for full assessment, interventions and recommendations. Note: Prognosis: Good  Problem List: Decreased endurance, Impaired balance, Decreased mobility, Decreased cognition, Impaired judgement, Decreased safety awareness  Assessment: Pt is a 41 y.o. male admitted to [facility] on 8/11/2023 from [facility] with unresponsiveness and AMS. He presented to hospital originally on 7/28/2023 s/p fall and was found to be SOB with volume overload likely due to noncompliance with heart failure medications. +encephalopathic at that time and dc to [facility] on 8/5/2023. Pt received a primary medical dx of encephaloapthy. Pt has the following comorbidities which affect their treatment: active problems including L vestibular schwannoma, anxiety/depression, heat failure, HTN, acute respiratory failure, obesity hypoventilation syndrome, stage 2 sacral decubitus ulcer, h/o meth and atoh abuse (3 years sober),  has a past medical history of Anxiety, Cancer ([address]), Depression, and Psychiatric disorder. 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  Pt has a high complexity clinical presentation due to Ongoing medical management for primary dx, Increased reliance on more restrictive AD compared to baseline, Decreased activity tolerance compared to baseline, Fall risk, Increased assistance needed from caregiver at current time, Ongoing telemetry monitoring, Cog status, Trending lab values, Diagnostic imaging pending, Continuous pulse oximetry monitoring  , and PMH. PT was consulted to evaluate pt's functional mobility and discharge needs. Upon evaluation, patient required minAx1 for all mobility as outlined above. Pt's functional impairments include: impaired balance, mobility, endurance, activity tolerance, and cognition. At conclusion of eval, pt remained supine in bed with bed alarm, phone, call bell, and all other personal needs within reach. Pt would benefit from skilled PT to address their functional mobility limitations. The patient's AM-PAC Basic Mobility Inpatient Short Form Raw Score is 17. A Raw score of greater than 16 suggests the patient may benefit from discharge to home. Please also refer to the recommendation of the Physical Therapist for safe discharge planning. D/C recommendations are rehab pending progress and family support. Barriers to Discharge: Decreased caregiver support, Inaccessible home environment     PT Discharge Recommendation: Post acute rehabilitation services (return to [facility])    See flowsheet documentation for full assessment.

## 2023-08-14 NOTE — PLAN OF CARE
Problem: Potential for Falls  Goal: Patient will remain free of falls  Description: INTERVENTIONS:  - Educate patient/family on patient safety including physical limitations  - Instruct patient to call for assistance with activity   - Consult OT/PT to assist with strengthening/mobility   - Keep Call bell within reach  - Keep bed low and locked with side rails adjusted as appropriate  - Keep care items and personal belongings within reach  - Initiate and maintain comfort rounds  - Make Fall Risk Sign visible to staff  - Offer Toileting every 2 Hours, in advance of need  - Initiate/Maintain bed alarm  - Obtain necessary fall risk management equipment  - Apply yellow socks and bracelet for high fall risk patients  - Consider moving patient to room near nurses station  Outcome: Progressing     Problem: MOBILITY - ADULT  Goal: Maintain or return to baseline ADL function  Description: INTERVENTIONS:  -  Assess patient's ability to carry out ADLs; assess patient's baseline for ADL function and identify physical deficits which impact ability to perform ADLs (bathing, care of mouth/teeth, toileting, grooming, dressing, etc.)  - Assess/evaluate cause of self-care deficits   - Assess range of motion  - Assess patient's mobility; develop plan if impaired  - Assess patient's need for assistive devices and provide as appropriate  - Encourage maximum independence but intervene and supervise when necessary  - Involve family in performance of ADLs  - Assess for home care needs following discharge   - Consider OT consult to assist with ADL evaluation and planning for discharge  - Provide patient education as appropriate  Outcome: Progressing  Goal: Maintains/Returns to pre admission functional level  Description: INTERVENTIONS:  - Perform BMAT or MOVE assessment daily.   - Set and communicate daily mobility goal to care team and patient/family/caregiver.    - Collaborate with rehabilitation services on mobility goals if consulted  - Perform Range of Motion 3 times a day. - Reposition patient every 2 hours.   - Dangle patient 3 times a day  - Stand patient 3 times a day  - Ambulate patient 3 times a day  - Out of bed to chair 3 times a day   - Out of bed for meals 3 times a day  - Out of bed for toileting  - Record patient progress and toleration of activity level   Outcome: Progressing     Problem: Prexisting or High Potential for Compromised Skin Integrity  Goal: Skin integrity is maintained or improved  Description: INTERVENTIONS:  - Identify patients at risk for skin breakdown  - Assess and monitor skin integrity  - Assess and monitor nutrition and hydration status  - Monitor labs   - Assess for incontinence   - Turn and reposition patient  - Assist with mobility/ambulation  - Relieve pressure over bony prominences  - Avoid friction and shearing  - Provide appropriate hygiene as needed including keeping skin clean and dry  - Evaluate need for skin moisturizer/barrier cream  - Collaborate with interdisciplinary team   - Patient/family teaching  - Consider wound care consult   Outcome: Progressing     Problem: PAIN - ADULT  Goal: Verbalizes/displays adequate comfort level or baseline comfort level  Description: Interventions:  - Encourage patient to monitor pain and request assistance  - Assess pain using appropriate pain scale  - Administer analgesics based on type and severity of pain and evaluate response  - Implement non-pharmacological measures as appropriate and evaluate response  - Consider cultural and social influences on pain and pain management  - Notify physician/advanced practitioner if interventions unsuccessful or patient reports new pain  Outcome: Progressing     Problem: INFECTION - ADULT  Goal: Absence or prevention of progression during hospitalization  Description: INTERVENTIONS:  - Assess and monitor for signs and symptoms of infection  - Monitor lab/diagnostic results  - Monitor all insertion sites, i.e. indwelling lines, tubes, and drains  - Monitor endotracheal if appropriate and nasal secretions for changes in amount and color  - [place] appropriate cooling/warming therapies per order  - Administer medications as ordered  - Instruct and encourage patient and family to use good hand hygiene technique  - Identify and instruct in appropriate isolation precautions for identified infection/condition  Outcome: Progressing     Problem: SAFETY ADULT  Goal: Patient will remain free of falls  Description: INTERVENTIONS:  - Educate patient/family on patient safety including physical limitations  - Instruct patient to call for assistance with activity   - Consult OT/PT to assist with strengthening/mobility   - Keep Call bell within reach  - Keep bed low and locked with side rails adjusted as appropriate  - Keep care items and personal belongings within reach  - Initiate and maintain comfort rounds  - Make Fall Risk Sign visible to staff  - Offer Toileting every 2 Hours, in advance of need  - Initiate/Maintain bed alarm  - Obtain necessary fall risk management equipment  - Apply yellow socks and bracelet for high fall risk patients  - Consider moving patient to room near nurses station  Outcome: Progressing  Goal: Maintain or return to baseline ADL function  Description: INTERVENTIONS:  -  Assess patient's ability to carry out ADLs; assess patient's baseline for ADL function and identify physical deficits which impact ability to perform ADLs (bathing, care of mouth/teeth, toileting, grooming, dressing, etc.)  - Assess/evaluate cause of self-care deficits   - Assess range of motion  - Assess patient's mobility; develop plan if impaired  - Assess patient's need for assistive devices and provide as appropriate  - Encourage maximum independence but intervene and supervise when necessary  - Involve family in performance of ADLs  - Assess for home care needs following discharge   - Consider OT consult to assist with ADL evaluation and planning for discharge  - Provide patient education as appropriate  Outcome: Progressing  Goal: Maintains/Returns to pre admission functional level  Description: INTERVENTIONS:  - Perform BMAT or MOVE assessment daily.   - Set and communicate daily mobility goal to care team and patient/family/caregiver. - Collaborate with rehabilitation services on mobility goals if consulted  - Perform Range of Motion 3 times a day. - Reposition patient every 2 hours. - Dangle patient 3 times a day  - Stand patient 3 times a day  - Ambulate patient 3 times a day  - Out of bed to chair 3 times a day   - Out of bed for meals 3 times a day  - Out of bed for toileting  - Record patient progress and toleration of activity level   Outcome: Progressing     Problem: DISCHARGE PLANNING  Goal: Discharge to home or other facility with appropriate resources  Description: INTERVENTIONS:  - Identify barriers to discharge w/patient and caregiver  - Arrange for needed discharge resources and transportation as appropriate  - Identify discharge learning needs (meds, wound care, etc.)  - Arrange for interpretive services to assist at discharge as needed  - Refer to Case Management Department for coordinating discharge planning if the patient needs post-hospital services based on physician/advanced practitioner order or complex needs related to functional status, cognitive ability, or social support system  Outcome: Progressing     Problem: Knowledge Deficit  Goal: Patient/family/caregiver demonstrates understanding of disease process, treatment plan, medications, and discharge instructions  Description: Complete learning assessment and assess knowledge base.   Interventions:  - Provide teaching at level of understanding  - Provide teaching via preferred learning methods  Outcome: Progressing     Problem: Nutrition/Hydration-ADULT  Goal: Nutrient/Hydration intake appropriate for improving, restoring or maintaining nutritional needs  Description: Monitor and assess patient's nutrition/hydration status for malnutrition. Collaborate with interdisciplinary team and initiate plan and interventions as ordered. Monitor patient's weight and dietary intake as ordered or per policy. Utilize nutrition screening tool and intervene as necessary. Determine patient's food preferences and provide high-protein, high-caloric foods as appropriate.      INTERVENTIONS:  - Monitor oral intake, urinary output, labs, and treatment plans  - Assess nutrition and hydration status and recommend course of action  - Evaluate amount of meals eaten  - Assist patient with eating if necessary   - Allow adequate time for meals  - Recommend/ encourage appropriate diets, oral nutritional supplements, and vitamin/mineral supplements  - Order, calculate, and assess calorie counts as needed  - Recommend, monitor, and adjust tube feedings and TPN/PPN based on assessed needs  - Assess need for intravenous fluids  - Provide specific nutrition/hydration education as appropriate  - Include patient/family/caregiver in decisions related to nutrition  Outcome: Progressing     Problem: COPING  Goal: Pt/Family able to verbalize concerns and demonstrate effective coping strategies  Description: INTERVENTIONS:  - Assist patient/family to identify coping skills, available support systems and cultural and spiritual values  - Provide emotional support, including active listening and acknowledgement of concerns of patient and caregivers  - Reduce environmental stimuli, as able  - Provide patient education  - Assess for spiritual pain/suffering and initiate spiritual care, including notification of Pastoral Care or melody based community as needed  - Assess effectiveness of coping strategies  Outcome: Progressing  Goal: Will report anxiety at manageable levels  Description: INTERVENTIONS:  - Administer medication as ordered  - Teach and encourage coping skills  - Provide emotional support  - Assess patient/family for anxiety and ability to cope  Outcome: Progressing     Problem: NEUROSENSORY - ADULT  Goal: Achieves stable or improved neurological status  Description: INTERVENTIONS  - Monitor and report changes in neurological status  - Monitor vital signs such as temperature, blood pressure, glucose, and any other labs ordered   - Initiate measures to prevent increased intracranial pressure  - Monitor for seizure activity and implement precautions if appropriate      Outcome: Progressing  Goal: Achieves maximal functionality and self care  Description: INTERVENTIONS  - Monitor swallowing and airway patency with patient fatigue and changes in neurological status  - Encourage and assist patient to increase activity and self care.    - Encourage visually impaired, hearing impaired and aphasic patients to use assistive/communication devices  Outcome: Progressing

## 2023-08-14 NOTE — CONSULTS
Consultation - [address] [place] [address] 41 y.o. male MRN: [id number]  Unit/Bed#: [facility] 704-01 Encounter: [id number]    Assessment:  Pressure injury of right buttock, stage 2   Pressure injury of the left buttock, stage 2   Ambulatory dysfunction   Morbid obesity     Plan:  • B/l buttocks with stage 2 pressure injury - POA. o Wounds mechanically debrided with dry gauze today  o Moderate amount of drainage - will recommend maxorb and foam dressing for exudate management  o Pressure relief   o No s/s of infection present  •  A1C results reviewed with the patient today. • Will recommend preventative nursing skin care measures  • Pressure relief- offloading of pressure with turning/repositioning as patient medically tolerates, heel elevation, foam wedges for offloading/repositioning, and waffle cushion to chair. • Nutrition is following  • Patient verbalized understanding of plan of care. • [place] text wound care team with questions or concerns. • Routine wound care follow-up while admitted. AVS updated. History of Present Illness:  Patient is a 41year old male who is admitted to the hospital with encephalopathy. History of - anxiety, cancer, depression, bipolar disorder, HTN, fall, and obesity. [facility] Wound care consulted for buttock wound noted on admission. Patient seen on med-surg floor, transferred out of ICU earlier today. Patient seen in bed, alert and oriented x 2-3 with forgetfulness, cooperative and agreeable for the assessment. Per chart review, wound images reviewed from 8/3/23 on another hospital admission, wound appears to be evolving DTI at that time. Patient reports his buttocks is sore, but not worsening. Denies fever, chills, or increased pain related to the wound. Subjective:    Review of Systems   Constitutional: Positive for fatigue. Negative for chills and fever. HENT: Negative for congestion and sneezing.     Respiratory: Negative for cough and shortness of breath. Musculoskeletal: Positive for gait problem. Skin: Positive for wound. Psychiatric/Behavioral: Negative for agitation.        Historical Information   Past Medical History:   Diagnosis Date   • Anxiety    • Cancer [facility])    • Depression    • Psychiatric disorder     bipolar     Past Surgical History:   Procedure Laterality Date   • IR BIOPSY LYMPH NODE  1/20/2023   • IR PORT PLACEMENT  3/14/2023   • ORCHIECTOMY Left 12/14/2022    Procedure: ORCHIECTOMY INGUINAL;  Surgeon: Shane Montiel MD;  Location: [facility];  Service: Urology     Social History   Social History     Substance and Sexual Activity   Alcohol Use Not Currently    Comment: 2 cases of beer daily, stopped 3 years ago     Social History     Substance and Sexual Activity   Drug Use Yes   • Types: Marijuana    Comment: Medical marijuana     E-Cigarette/Vaping   • E-Cigarette Use Never User      E-Cigarette/Vaping Substances     Social History     Tobacco Use   Smoking Status Not on file   Smokeless Tobacco Never     Family History:   Family History   Problem Relation Age of Onset   • Coronary artery disease Maternal Aunt        Meds/Allergies   current meds:   Current Facility-Administered Medications   Medication Dose Route Frequency   • [MAR Hold] chlorhexidine (PERIDEX) 0.12 % oral rinse 15 mL  15 mL Mouth/Throat Q12H [facility]   • [MAR Hold] enoxaparin (LOVENOX) subcutaneous injection 40 mg  40 mg Subcutaneous Q12H [facility]   • [MAR Hold] Famotidine (PF) (PEPCID) injection 20 mg  20 mg Intravenous BID   • [MAR Hold] fentanyl citrate (PF) 100 MCG/2ML 50 mcg  50 mcg Intravenous Q1H PRN   • [MAR Hold] FLUoxetine (PROzac) capsule 10 mg  10 mg Oral Daily   • [MAR Hold] furosemide (LASIX) injection 20 mg  20 mg Intravenous BID (diuretic)   • [MAR Hold] modafinil (PROVIGIL) tablet 100 mg  100 mg Oral Daily   • [MAR Hold] ondansetron (ZOFRAN) injection 4 mg  4 mg Intravenous Q6H PRN   • [MAR Hold] polyethylene glycol (MIRALAX) packet 17 g 17 g Oral Daily   • [MAR Hold] senna (SENOKOT) tablet 17.2 mg  2 tablet Oral BID   • [MAR Hold] thiamine (VITAMIN B1) 500 mg in sodium chloride 0.9 % 50 mL IVPB  500 mg Intravenous TID     No Known Allergies    Objective   Vitals: Blood pressure 142/93, pulse 98, temperature 100.1 °F (37.8 °C), resp. rate 18, SpO2 94 %. Wounds:     Wound 08/03/23 Buttocks (Active)   Wound Image   08/14/23 1208   Wound Length (cm) 6.4 cm 08/14/23 1208   Wound Width (cm) 8.5 cm 08/14/23 1208   Wound Depth (cm) 0.1 cm 08/14/23 1208   Wound Surface Area (cm^2) 54.4 cm^2 08/14/23 1208   Wound Volume (cm^3) 5.44 cm^3 08/14/23 1208   Calculated Wound Volume (cm^3) 5.44 cm^3 08/14/23 1208   Change in Wound Size % -8.8 08/14/23 1208             Physical Exam  Constitutional:       General: He is awake. He is not in acute distress. Appearance: He is morbidly obese. He is not diaphoretic. HENT:      Head: Normocephalic and atraumatic. Right Ear: External ear normal.      Left Ear: External ear normal.   Eyes:      Conjunctiva/sclera: Conjunctivae normal.   Pulmonary:      Effort: Pulmonary effort is normal. No respiratory distress. Genitourinary:     Comments: No incontinence noted at time of the assessment. Musculoskeletal:      Comments: Max assist of one with turning for the assessment. Foam wedges are in use for repositioning. Dependent for care. Skin:     General: Skin is warm and dry. Findings: Wound present. No erythema. Comments: 1. B/l buttocks with stage 2 pressure injury. Wounds noted to each buttock, measured together. Wound beds are partial thickness with 100% moist pink/red tissue producing moderate amount of serous sanguineous drainage. Edges fragile and attached with slight maceration. Donna-wounds/remainder of skin to the b/l sacro-buttocks are dry and intact with blanchable hypopigmented scar tissue. 2. B/l heels are dry and intact without redness or wounds.      No induration, fluctuance, odor, warmth/temperature differences, redness, or purulence noted to the above mentioned wounds and skin areas assessed. New dressings applied as noted above. Patient tolerated assessment well- denies pain and no s/s of non-verbal pain or discomfort observed during the encounter. Neurological:      Mental Status: He is alert. Gait: Gait abnormal.   Psychiatric:         Behavior: Behavior is cooperative. Lab, Imaging and other studies: I have personally reviewed pertinent reports. Code Status: Level 1 - Full Code      Counseling / Coordination of Care  Total time spent today:    Total time (face-to-face and non-face-to-face) spent on today's visit was 30 minutes. This includes preparation for the visits (H&P on 8/11/23, wound images on 8/3/23, and critical care note on 8/14/23) performance of a medically appropriate history and examination, and orders for medications/treatments or testing. Discussed assessment findings, and plan of care/recommendations with patients RN. AMBER Arambula, EDWARDO, GRAYSON      Portions of the record may have been created with voice recognition software. Occasional wrong word or "sound a like" substitutions may have occurred due to the inherent limitations of voice recognition software.   Read the chart carefully and recognize, using context, where substitutions have occurred

## 2023-08-14 NOTE — SPEECH THERAPY NOTE
Speech Language/Pathology    Speech/Language Pathology Progress Note    Patient Name: Claudetta Hidden  NYGGK'R Date: 8/14/2023     Problem List  Principal Problem:    Encephalopathy  Active Problems:    Hypertension    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HCC)    Cerebral gliosis    Unilateral vestibular schwannoma (HCC)    Chronic diastolic heart failure (HCC)    Fall    Hyponatremia    Bipolar I disorder (HCC)    Obesity hypoventilation syndrome (HCC)    Somnolence    Acute respiratory failure (HCC)    Catatonic reaction       Past Medical History  Past Medical History:   Diagnosis Date   • Anxiety    • Cancer ([address])    • Depression    • Psychiatric disorder     bipolar        Past Surgical History  Past Surgical History:   Procedure Laterality Date   • IR BIOPSY LYMPH NODE  1/20/2023   • IR PORT PLACEMENT  3/14/2023   • ORCHIECTOMY Left 12/14/2022    Procedure: ORCHIECTOMY INGUINAL;  Surgeon: Veto Edwards MD;  Location: [facility];  Service: Urology         Subjective:  Pt awake and alert. Lunch tray untouched at bedside. Voicing is low volume, limited verbal output though pt does respond to questions. "Not so good"     Current Diet:  Puree/honey thick    Objective:  Pt seen for dx dysphagia tx f/u to assess PO tolerance/appropriateness for potential upgrade. Pt self-feeds puree/honey thick liquids. Pt is impulsive w/ PO, taking large bites and rapidly sipping liquids. Cues provided for smaller bolus size/slowed rate though pt does not follow cues/prompting. Despite large bolus/rapid intake, no overt s/s aspiration w/ current diet. Trials of thin liquid presented. Significant cough x1 w/ thin liquids on final sip of 4 oz. Cough again noted x1 w/ nectar thick liquids. Labored breathing noted across trials, worsened w/ nectar thick/thin. S/w pt regarding potential MBS to further assess/guide tx plan. Pt agreeable at this time.      Assessment:  Pt is tolerating current diet puree/honey thick liquids and trials of soft solids. Ongoing s/s aspiration nectar thick and thin. Pt would benefit from MBS to further assess.      Plan/Recommendations:  Mech soft w/ honey thick   Frequent/thorough oral care  ST to f/u as able/approrpiate MBS to be completed, attempted to schedule for today though unable - will tentatively schedule for tomorrow

## 2023-08-14 NOTE — ASSESSMENT & PLAN NOTE
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 Neurology consulted for encephalopathy. Pt was sent from acute rehab facility d/t AMS and unresponsiveness. Patient was admitted to the medical floor and CC was called to evaluate patient as DI alert was initiated, GCS was 5 and patient was intubated and transferred to the Neuro ICU. History of EtoH Abuse, Multiple substance abuse (cocaine, thc, meth), reported to be 3 yr sober  Imaging  · CT head 8/11 was unremarkable. · CT CAP 8/11 was unremarkable the exception of stable left para-aortic adenopathy, known to Oncology. · MRI brain 8/12: Demonstrated enhancement along the anterior floor of the third ventricle/hypothalamus/mammillary bodies which was noted to be stable compared to MRI from March 15, 2023.  Also demonstrated nonspecific scattered and punctate T2 signal abnormalities. · MRI brain 6/12/2023: Was noted to have stable 2 to 3 mm nodular enhancement within the IACs bilaterally (vestibular schwannomas?)  Labs:  · Normal: B12, folate, TSH, CSF cells, CSF protein, CSF ME panel, CSF Gram stain, CSF cryptococcal   · ,  AFP 2.54, HCG Neg  · Pending: Lyme PCR, thiamine, autoimmune encephalitis/encephalopathy paraneoplastic panel ENC2 & ENS2, CSF VDRL    Impression: Subacute profound encephalopathy, improving.  Unclear etiology differential diagnosis at this time includes the following: Most likely 2/2 to Wernicke Encephalopathy - given ataxia with recurrent falls, MRI findings, double vision per onc note, and progressive encephalopathy and clinical improvement after 24-48 hrs of high dose thiamine replacement versus Narcolepsy - vEEG demonstrated abundance REM sleep with diminished latency. No prior diagnosis versus Autoimmune/Paraneoplastic: Less likely given improvement without empiric treatment, and Phsycogenic/Functional - Less likely given MRI findings, reported apnea when sleeping, however if b1 normal conversion disorder in the setting of psychological stressors of metastatic cancer is a potential etiology however diagnosis of exclusion. Plan:  · vEEG discontinued follow exam and monitor clinically.  Hold off on AEDs   · Modafinil 100 mg daily  · Thiamine 500 mg IV TID for total of 5 days, followed by daily oral supplementation  · Follow up Thiamine Level  · Pending CSF Studies: Orexin-A/Hypocretin-1, LTO6, ENS2 Urine Toxicology Screen  · PT/OT - recommending return to Acute Rehab upon d/c  · Hematology Oncology following  · Recommending Psychiatry Consult for evaluation of possible catatonia, psychogenic, FND  · DVT PPX: Lovenox, SCDs  · Ambulatory outpatient referral for sleep medicine   · Rest of care per primary

## 2023-08-14 NOTE — DISCHARGE INSTR - OTHER ORDERS
Cleanse b/l sacro-buttocks with soap and water, pat dry, and apply maxorb to the wound beds. Cover with sacral bordered alleyvn foam dressing. Chepe dressing with T. Change every other day and as needed for soilage/dislodgement.

## 2023-08-14 NOTE — PHYSICAL THERAPY NOTE
Physical Therapy Evaluation    Patient Name: Marcello Traore    IGXJN'L Date: 8/14/2023     Problem List  Principal Problem:    Encephalopathy  Active Problems:    Hypertension    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HCC)    Cerebral gliosis    Unilateral vestibular schwannoma (HCC)    Chronic diastolic heart failure (HCC)    Fall    Hyponatremia    Bipolar I disorder (HCC)    Obesity hypoventilation syndrome (HCC)    Somnolence    Acute respiratory failure (HCC)    Catatonic reaction       Past Medical History  Past Medical History:   Diagnosis Date    Anxiety     Cancer ([address])     Depression     Psychiatric disorder     bipolar        Past Surgical History  Past Surgical History:   Procedure Laterality Date    IR BIOPSY LYMPH NODE  1/20/2023    IR PORT PLACEMENT  3/14/2023    ORCHIECTOMY Left 12/14/2022    Procedure: ORCHIECTOMY INGUINAL;  Surgeon: Milton Lang MD;  [facility]: [facility];  Service: Urology           08/14/23 1419   PT Last Visit   PT Visit Date 08/14/23   Note Type   Note type Evaluation   Pain Assessment   Pain Assessment Tool 0-10   Pain Score No Pain   Restrictions/Precautions   Weight Bearing Precautions Per Order No   Other Precautions Cognitive; Chair Alarm; Bed Alarm;Multiple lines;Telemetry; Fall Risk  (minimally verbal)   Home Living   Type of [address] [place] [address] Two level;Bed/bath upstairs;Stairs to enter with rails  (8 ADIEL + Full flight to 2nd floor bed/bath)   Bathroom Shower/Tub Tub/shower unit   [employer] Standard   Additional Comments presents from [facility]. no AD use prior to admission leading to [facility]   Prior Function   Level of [place] Independent with ADLs; Independent with functional mobility   Lives With Spouse;Son  (2 yo son)   Receives Help From [facility] in the last 6 months 1 to 4  (1)   Comments prior to recent admission and [facility], pt Independent   General   Family/Caregiver Present No   Cognition   Overall Cognitive Status (S)  Impaired   Arousal/Participation Cooperative   Orientation Level Oriented to person;Oriented to place; Disoriented to situation;Disoriented to time   Memory Decreased short term memory;Decreased recall of recent events;Decreased long term memory;Decreased recall of biographical information   Following Commands Follows one step commands with increased time or repetition   Comments very pleasant to work with. minimally verbal. presents with labile mood, heavy breathing throughout with stable vitals   RLE Assessment   RLE Assessment WFL   LLE Assessment   LLE Assessment WFL   Light Touch   RLE Light Touch Grossly intact   LLE Light Touch Grossly intact   Bed Mobility   Supine to Sit 4  Minimal assistance   Additional items Assist x 1; Increased time required;Verbal cues;HOB elevated   Sit to Supine 4  Minimal assistance   Additional items Assist x 1; Increased time required;Verbal cues;LE management   Transfers   Sit to Stand 4  Minimal assistance   Additional items Assist x 1; Increased time required;Verbal cues   Stand to Sit 4  Minimal assistance   Additional items Assist x 1; Increased time required;Verbal cues   Stand pivot 4  Minimal assistance   Additional items Assist x 1; Increased time required;Verbal cues   Additional Comments HHA   Ambulation/Elevation   Gait pattern Improper Weight shift;Decreased foot clearance; Short stride; Excessively slow   Gait Assistance 4  Minimal assist   Additional items Assist x 1;Verbal cues  (+SBA of 2nd for safety due to pt height/weight and unsteadiness)   Assistive Device   (HHA)   Distance 25'x2   Ambulation/Elevation Additional Comments +unsteady   Balance   Static Sitting Fair -   Dynamic Sitting Poor +   Static Standing Poor +   Dynamic Standing Poor +   Ambulatory Poor +   Endurance Deficit   Endurance Deficit Yes   Activity Tolerance   Activity Tolerance Patient tolerated treatment well   Medical Staff Made Aware restorative [facility]   Nurse Made Aware yes-cleared   Assessment   Prognosis Good   Problem List Decreased endurance; Impaired balance;Decreased mobility; Decreased cognition; Impaired judgement;Decreased safety awareness   Assessment Pt is a 41 y.o. male admitted to [facility] on 8/11/2023 from [facility] with unresponsiveness and AMS. He presented to hospital originally on 7/28/2023 s/p fall and was found to be SOB with volume overload likely due to noncompliance with heart failure medications. +encephalopathic at that time and dc to [facility] on 8/5/2023. Pt received a primary medical dx of encephaloapthy. Pt has the following comorbidities which affect their treatment: active problems including L vestibular schwannoma, anxiety/depression, heat failure, HTN, acute respiratory failure, obesity hypoventilation syndrome, stage 2 sacral decubitus ulcer, h/o meth and atoh abuse (3 years sober),  has a past medical history of Anxiety, Cancer ([address]), Depression, and Psychiatric disorder. 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 Pt has a high complexity clinical presentation due to Ongoing medical management for primary dx, Increased reliance on more restrictive AD compared to baseline, Decreased activity tolerance compared to baseline, Fall risk, Increased assistance needed from caregiver at current time, Ongoing telemetry monitoring, Cog status, Trending lab values, Diagnostic imaging pending, Continuous pulse oximetry monitoring  , and PMH. PT was consulted to evaluate pt's functional mobility and discharge needs. Upon evaluation, patient required minAx1 for all mobility as outlined above. Pt's functional impairments include: impaired balance, mobility, endurance, activity tolerance, and cognition. At conclusion of eval, pt remained supine in bed with bed alarm, phone, call bell, and all other personal needs within reach. Pt would benefit from skilled PT to address their functional mobility limitations.  The patient's AM-PAC Basic Mobility Inpatient Short Form Raw Score is 17. A Raw score of greater than 16 suggests the patient may benefit from discharge to home. Please also refer to the recommendation of the Physical Therapist for safe discharge planning. D/C recommendations are rehab pending progress and family support. Barriers to Discharge Decreased caregiver support; Inaccessible home environment   Goals   Patient Goals none stated   STG Expiration Date 08/28/23   Short Term Goal #1 In 14 days, pt will: 1) perform bed mobility with mod I to promote functional independence and decrease caregiver burden. 2) perform transfers to<>from all surfaces with mod I to promote functional independence and decrease caregiver burden. 3) ambulate 300' with mod I and least restrictive device to promote safe return to home 4) negotiate 13 stairs with mod I to promote safe return to home 5) improve balance grades by 1/2 to promote safety with functional mobility. PT Treatment Day 0   Plan   Treatment/Interventions Functional transfer training;LE strengthening/ROM; Therapeutic exercise;Elevations; Endurance training;Cognitive reorientation;Patient/family training;Equipment eval/education; Bed mobility;Gait training;Spoke to nursing;Spoke to case management;OT;ST;Family   PT Frequency 2-3x/wk   Recommendation   PT Discharge Recommendation Post acute rehabilitation services  (return to [facility])   Equipment Recommended   (TBD)   AM-PAC Basic Mobility Inpatient   Turning in Flat Bed Without Bedrails 3   Lying on Back to Sitting on Edge of Flat Bed Without Bedrails 3   Moving Bed to Chair 3   Standing Up From Chair Using Arms 3   Walk in Room 3   Climb 3-5 Stairs With Railing 2   Basic Mobility Inpatient Raw Score 17   Basic Mobility Standardized Score 39.67   Highest Level Of Mobility   JH-HLM Goal 5: Stand one or more mins   JH-HLM Achieved 7: Walk 25 feet or more   Modified [place] Scale   Modified [place] Scale 4   Barthel Index   Feeding 10 Bathing 0   Grooming Score 0   Dressing Score 5   Bladder Score 10   Bowels Score 10   Toilet Use Score 5   Transfers (Bed/Chair) Score 10   Mobility (Level Surface) Score 0   Stairs Score 0   Barthel Index Score 50   Alfie Curtis, PT, DPT

## 2023-08-14 NOTE — PLAN OF CARE
Problem: Potential for Falls  Goal: Patient will remain free of falls  Description: INTERVENTIONS:  - Educate patient/family on patient safety including physical limitations  - Instruct patient to call for assistance with activity   - Consult OT/PT to assist with strengthening/mobility   - Keep Call bell within reach  - Keep bed low and locked with side rails adjusted as appropriate  - Keep care items and personal belongings within reach  - Initiate and maintain comfort rounds  - Make Fall Risk Sign visible to staff  - Apply yellow socks and bracelet for high fall risk patients  - Consider moving patient to room near nurses station  Outcome: Progressing     Problem: MOBILITY - ADULT  Goal: Maintain or return to baseline ADL function  Description: INTERVENTIONS:  -  Assess patient's ability to carry out ADLs; assess patient's baseline for ADL function and identify physical deficits which impact ability to perform ADLs (bathing, care of mouth/teeth, toileting, grooming, dressing, etc.)  - Assess/evaluate cause of self-care deficits   - Assess range of motion  - Assess patient's mobility; develop plan if impaired  - Assess patient's need for assistive devices and provide as appropriate  - Encourage maximum independence but intervene and supervise when necessary  - Involve family in performance of ADLs  - Assess for home care needs following discharge   - Consider OT consult to assist with ADL evaluation and planning for discharge  - Provide patient education as appropriate  Outcome: Progressing  Goal: Maintains/Returns to pre admission functional level  Description: INTERVENTIONS:  - Perform BMAT or MOVE assessment daily.   - Set and communicate daily mobility goal to care team and patient/family/caregiver.    - Collaborate with rehabilitation services on mobility goals if consulted  - Out of bed for toileting  - Record patient progress and toleration of activity level   Outcome: Progressing     Problem: Prexisting or High Potential for Compromised Skin Integrity  Goal: Skin integrity is maintained or improved  Description: INTERVENTIONS:  - Identify patients at risk for skin breakdown  - Assess and monitor skin integrity  - Assess and monitor nutrition and hydration status  - Monitor labs   - Assess for incontinence   - Turn and reposition patient  - Assist with mobility/ambulation  - Relieve pressure over bony prominences  - Avoid friction and shearing  - Provide appropriate hygiene as needed including keeping skin clean and dry  - Evaluate need for skin moisturizer/barrier cream  - Collaborate with interdisciplinary team   - Patient/family teaching  - Consider wound care consult   Outcome: Progressing     Problem: PAIN - ADULT  Goal: Verbalizes/displays adequate comfort level or baseline comfort level  Description: Interventions:  - Encourage patient to monitor pain and request assistance  - Assess pain using appropriate pain scale  - Administer analgesics based on type and severity of pain and evaluate response  - Implement non-pharmacological measures as appropriate and evaluate response  - Consider cultural and social influences on pain and pain management  - Notify physician/advanced practitioner if interventions unsuccessful or patient reports new pain  Outcome: Progressing     Problem: INFECTION - ADULT  Goal: Absence or prevention of progression during hospitalization  Description: INTERVENTIONS:  - Assess and monitor for signs and symptoms of infection  - Monitor lab/diagnostic results  - Monitor all insertion sites, i.e. indwelling lines, tubes, and drains  - Monitor endotracheal if appropriate and nasal secretions for changes in amount and color  - [place] appropriate cooling/warming therapies per order  - Administer medications as ordered  - Instruct and encourage patient and family to use good hand hygiene technique  - Identify and instruct in appropriate isolation precautions for identified infection/condition  Outcome: Progressing     Problem: SAFETY ADULT  Goal: Patient will remain free of falls  Description: INTERVENTIONS:  - Educate patient/family on patient safety including physical limitations  - Instruct patient to call for assistance with activity   - Consult OT/PT to assist with strengthening/mobility   - Keep Call bell within reach  - Keep bed low and locked with side rails adjusted as appropriate  - Keep care items and personal belongings within reach  - Initiate and maintain comfort rounds  - Make Fall Risk Sign visible to staff  - Offer Toileting every 2 Hours, in advance of need  - Initiate/Maintain bed alarm  - Obtain necessary fall risk management equipment:   - Apply yellow socks and bracelet for high fall risk patients  - Consider moving patient to room near nurses station  Outcome: Progressing  Goal: Maintain or return to baseline ADL function  Description: INTERVENTIONS:  -  Assess patient's ability to carry out ADLs; assess patient's baseline for ADL function and identify physical deficits which impact ability to perform ADLs (bathing, care of mouth/teeth, toileting, grooming, dressing, etc.)  - Assess/evaluate cause of self-care deficits   - Assess range of motion  - Assess patient's mobility; develop plan if impaired  - Assess patient's need for assistive devices and provide as appropriate  - Encourage maximum independence but intervene and supervise when necessary  - Involve family in performance of ADLs  - Assess for home care needs following discharge   - Consider OT consult to assist with ADL evaluation and planning for discharge  - Provide patient education as appropriate  Outcome: Progressing  Goal: Maintains/Returns to pre admission functional level  Description: INTERVENTIONS:  - Perform BMAT or MOVE assessment daily.   - Set and communicate daily mobility goal to care team and patient/family/caregiver.    - Collaborate with rehabilitation services on mobility goals if consulte  - Reposition patient every 2  hours. - Out of bed for toileting  - Record patient progress and toleration of activity level   Outcome: Progressing     Problem: DISCHARGE PLANNING  Goal: Discharge to home or other facility with appropriate resources  Description: INTERVENTIONS:  - Identify barriers to discharge w/patient and caregiver  - Arrange for needed discharge resources and transportation as appropriate  - Identify discharge learning needs (meds, wound care, etc.)  - Arrange for interpretive services to assist at discharge as needed  - Refer to Case Management Department for coordinating discharge planning if the patient needs post-hospital services based on physician/advanced practitioner order or complex needs related to functional status, cognitive ability, or social support system  Outcome: Progressing     Problem: Knowledge Deficit  Goal: Patient/family/caregiver demonstrates understanding of disease process, treatment plan, medications, and discharge instructions  Description: Complete learning assessment and assess knowledge base. Interventions:  - Provide teaching at level of understanding  - Provide teaching via preferred learning methods  Outcome: Progressing     Problem: Nutrition/Hydration-ADULT  Goal: Nutrient/Hydration intake appropriate for improving, restoring or maintaining nutritional needs  Description: Monitor and assess patient's nutrition/hydration status for malnutrition. Collaborate with interdisciplinary team and initiate plan and interventions as ordered. Monitor patient's weight and dietary intake as ordered or per policy. Utilize nutrition screening tool and intervene as necessary. Determine patient's food preferences and provide high-protein, high-caloric foods as appropriate.      INTERVENTIONS:  - Monitor oral intake, urinary output, labs, and treatment plans  - Assess nutrition and hydration status and recommend course of action  - Evaluate amount of meals eaten  - Assist patient with eating if necessary   - Allow adequate time for meals  - Recommend/ encourage appropriate diets, oral nutritional supplements, and vitamin/mineral supplements  - Order, calculate, and assess calorie counts as needed  - Recommend, monitor, and adjust tube feedings and TPN/PPN based on assessed needs  - Assess need for intravenous fluids  - Provide specific nutrition/hydration education as appropriate  - Include patient/family/caregiver in decisions related to nutrition  Outcome: Progressing     Problem: COPING  Goal: Pt/Family able to verbalize concerns and demonstrate effective coping strategies  Description: INTERVENTIONS:  - Assist patient/family to identify coping skills, available support systems and cultural and spiritual values  - Provide emotional support, including active listening and acknowledgement of concerns of patient and caregivers  - Reduce environmental stimuli, as able  - Provide patient education  - Assess for spiritual pain/suffering and initiate spiritual care, including notification of Pastoral Care or melody based community as needed  - Assess effectiveness of coping strategies  Outcome: Progressing  Goal: Will report anxiety at manageable levels  Description: INTERVENTIONS:  - Administer medication as ordered  - Teach and encourage coping skills  - Provide emotional support  - Assess patient/family for anxiety and ability to cope  Outcome: Progressing     Problem: NEUROSENSORY - ADULT  Goal: Achieves stable or improved neurological status  Description: INTERVENTIONS  - Monitor and report changes in neurological status  - Monitor vital signs such as temperature, blood pressure, glucose, and any other labs ordered   - Initiate measures to prevent increased intracranial pressure  - Monitor for seizure activity and implement precautions if appropriate      Outcome: Progressing  Goal: Achieves maximal functionality and self care  Description: INTERVENTIONS  - Monitor swallowing and airway patency with patient fatigue and changes in neurological status  - Encourage and assist patient to increase activity and self care.    - Encourage visually impaired, hearing impaired and aphasic patients to use assistive/communication devices  Outcome: Progressing     Problem: CARDIOVASCULAR - ADULT  Goal: Maintains optimal cardiac output and hemodynamic stability  Description: INTERVENTIONS:  - Monitor I/O, vital signs and rhythm  - Monitor for S/S and trends of decreased cardiac output  - Administer and titrate ordered vasoactive medications to optimize hemodynamic stability  - Assess quality of pulses, skin color and temperature  - Assess for signs of decreased coronary artery perfusion  - Instruct patient to report change in severity of symptoms  Outcome: Progressing  Goal: Absence of cardiac dysrhythmias or at baseline rhythm  Description: INTERVENTIONS:  - Continuous cardiac monitoring, vital signs, obtain 12 lead EKG if ordered  - Administer antiarrhythmic and heart rate control medications as ordered  - Monitor electrolytes and administer replacement therapy as ordered  Outcome: Progressing     Problem: RESPIRATORY - ADULT  Goal: Achieves optimal ventilation and oxygenation  Description: INTERVENTIONS:  - Assess for changes in respiratory status  - Assess for changes in mentation and behavior  - Position to facilitate oxygenation and minimize respiratory effort  - Oxygen administered by appropriate delivery if ordered  - Initiate smoking cessation education as indicated  - Encourage broncho-pulmonary hygiene including cough, deep breathe, Incentive Spirometry  - Assess the need for suctioning and aspirate as needed  - Assess and instruct to report SOB or any respiratory difficulty  - Respiratory Therapy support as indicated  Outcome: Progressing     Problem: GASTROINTESTINAL - ADULT  Goal: Maintains or returns to baseline bowel function  Description: INTERVENTIONS:  - Assess bowel function  - Encourage oral fluids to ensure adequate hydration  - Administer IV fluids if ordered to ensure adequate hydration  - Administer ordered medications as needed  - Encourage mobilization and activity  - Consider nutritional services referral to assist patient with adequate nutrition and appropriate food choices  Outcome: Progressing  Goal: Maintains adequate nutritional intake  Description: INTERVENTIONS:  - Monitor percentage of each meal consumed  - Identify factors contributing to decreased intake, treat as appropriate  - Assist with meals as needed  - Monitor I&O, weight, and lab values if indicated  - Obtain nutrition services referral as needed  Outcome: Progressing     Problem: GENITOURINARY - ADULT  Goal: Maintains or returns to baseline urinary function  Description: INTERVENTIONS:  - Assess urinary function  - Encourage oral fluids to ensure adequate hydration if ordered  - Administer IV fluids as ordered to ensure adequate hydration  - Administer ordered medications as needed  - Offer frequent toileting  - Follow urinary retention protocol if ordered  Outcome: Progressing  Goal: Absence of urinary retention  Description: INTERVENTIONS:  - Assess patient’s ability to void and empty bladder  - Monitor I/O  - Bladder scan as needed  - Discuss with physician/AP medications to alleviate retention as needed  - Discuss catheterization for long term situations as appropriate  Outcome: Progressing  Goal: Urinary catheter remains patent  Description: INTERVENTIONS:  - Assess patency of urinary catheter  - If patient has a chronic puga, consider changing catheter if non-functioning  - Follow guidelines for intermittent irrigation of non-functioning urinary catheter  Outcome: Progressing     Problem: METABOLIC, FLUID AND ELECTROLYTES - ADULT  Goal: Electrolytes maintained within normal limits  Description: INTERVENTIONS:  - Monitor labs and assess patient for signs and symptoms of electrolyte imbalances  - Administer electrolyte replacement as ordered  - Monitor response to electrolyte replacements, including repeat lab results as appropriate  - Instruct patient on fluid and nutrition as appropriate  Outcome: Progressing  Goal: Fluid balance maintained  Description: INTERVENTIONS:  - Monitor labs   - Monitor I/O and WT  - Instruct patient on fluid and nutrition as appropriate  - Assess for signs & symptoms of volume excess or deficit  Outcome: Progressing     Problem: SKIN/TISSUE INTEGRITY - ADULT  Goal: Skin Integrity remains intact(Skin Breakdown Prevention)  Description: Assess:    -In  Goal: Pressure injury heals and does not worsen  Description: Interventions:  - Implement low air loss mattress or specialty surface (Criteria met)  - Apply silicone foam dressing  - Consider nutrition services referral as needed  Outcome: Progressing     Problem: HEMATOLOGIC - ADULT  Goal: Maintains hematologic stability  Description: INTERVENTIONS  - Assess for signs and symptoms of bleeding or hemorrhage  - Monitor labs  - Administer supportive blood products/factors as ordered and appropriate  Outcome: Progressing     Problem: MUSCULOSKELETAL - ADULT  Goal: Maintain or return mobility to safest level of function  Description: INTERVENTIONS:  - Assess patient's ability to carry out ADLs; assess patient's baseline for ADL function and identify physical deficits which impact ability to perform ADLs (bathing, care of mouth/teeth, toileting, grooming, dressing, etc.)  - Assess/evaluate cause of self-care deficits   - Assess range of motion  - Assess patient's mobility  - Assess patient's need for assistive devices and provide as appropriate  - Encourage maximum independence but intervene and supervise when necessary  - Involve family in performance of ADLs  - Assess for home care needs following discharge   - Consider OT consult to assist with ADL evaluation and planning for discharge  - Provide patient education as appropriate  Outcome: Progressing

## 2023-08-14 NOTE — RESTORATIVE TECHNICIAN NOTE
Restorative Technician Note      Patient Name: Claudetta Hidden     Note Type: Mobility  Patient Position Upon Consult: Supine  Activity Performed: Ambulated; Dangled; Stood  Assistive Device: Other (Comment) (Assist x1-2, Assisted PT Onamia Canes.)  Education Provided: Yes  Patient Position at End of Consult: Supine;  All needs within reach; Bed/Chair alarm activated      Xochitl PENG, Restorative Technician, [employer]

## 2023-08-14 NOTE — PROGRESS NOTES
[address] [facility]  Progress Note: Critical Care  Name: Jessica Thomson 41 y.o. male I MRN: [id number]  Unit/Bed#: ICU 15 I Date of Admission: 8/11/2023   Date of Service: 8/14/2023 I Hospital Day: 3    Assessment/Plan   Neuro/Psych:  Diagnoses: Encephalopathy (ddx Wernicke's, narcolepsy, autoimmune/paraneoplastic, psychogenic, drug use); Left vestibular schwannoma; Anxiety/Depression  Plan:  • IV Thiamine 500 mg TID (day 3 of 5), then transition to oral per neuro recs  • PO Modafinil 100 mg qd  • Neuro checks Q4H during day, Q2H at night  • F/u thiamine lvl, CSF fungal cultures, orexin/hypocretin and ENC2&ENS2  • Prozac 10 mg daily  • Analgesia: PRN Tylenol. PRN Fentanyl     CV:  Diagnoses: Heart failure preserved ejection fraction; Hypertension  MAP Trend: No data available for today  Plan: Maintain MAP > 65. SBP goal < 160.     Pulm:  Diagnoses: Acute respiratory failure; Obesity hypoventilation syndrome  SpO2 Trend: 95%  Plan: Continuous pulse oximetry. PRN BiPAP at night.     GI:  Diagnoses: No active issues  Last BM: None documented  Plan:  • GI prophylaxis: IV pepcid 20 mg BID  • Bowel regimen: Increased to Senna 2 tablets BID and miralax daily.     :  Diagnoses: No active issues  I/Os: Yesterday net -700 cc. No data available today  Plan:   • Goal negative 500 - 1L  • IV lasix 20 BID     F/E/N:  Diagnoses: No active issues  Plan:   • F: Fluid resuscitation prn. • E: Monitor and replete electrolytes for Mg >2, Phos >3, K >4.  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 Pressure injury prevention. Monitor for skin breakdown. PT/OT when appropriate. Encourage OOB and ambulation when appropriate. Local wound care prn.     Tox:  Diagnoses: History of meth and EtOH abuse (sober x 3 years); Current smoker (1/2 ppd)  Plan: Encourage cessation     LDA:  • Lines - RIJ Port, peripheral IV x2  • Drains - none  • Airways - none    Disposition: Med Surg    ICU Core Measures     A: Assess, Prevent, and Manage Pain · Has pain been assessed? Yes  · Need for changes to pain regimen? No   B: Both SAT/SAT  · N/A   C: Choice of Sedation · RASS Goal: 0 Alert and Calm or N/A patient not on sedation  · Need for changes to sedation or analgesia regimen? NA   D: Delirium · CAM-ICU: Negative   E: Early Mobility  · Plan for early mobility? Yes   F: Family Engagement · Plan for family engagement today? Yes       Review of Invasive Devices:      Central access plan: Port for oncotherapy    Prophylaxis:  VTE VTE covered by:  enoxaparin, Subcutaneous, 40 mg at 08/13/23 2030       Stress Ulcer  covered byFamotidine (PF) (PEPCID) injection 20 mg [[id number]]          Subjective   HPI/24hr events: NAEON. Review of Systems   Constitutional: Negative for chills and fever. Respiratory: Negative for shortness of breath. Cardiovascular: Negative for chest pain. Gastrointestinal: Negative for nausea. Neurological: Negative for headaches.         Objective                            Vitals I/O      Most Recent Min/Max in 24hrs   Temp 99.3 °F (37.4 °C) Temp  Min: 98.7 °F (37.1 °C)  Max: 100.3 °F (37.9 °C)   Pulse 78 Pulse  Min: 78  Max: 102   Resp (!) 25 Resp  Min: 17  Max: 40   /79 BP  Min: 99/34  Max: 149/86   O2 Sat 95 % SpO2  Min: 95 %  Max: 99 %      Intake/Output Summary (Last 24 hours) at 8/14/2023 0817  Last data filed at 8/13/2023 2200  Gross per 24 hour   Intake 1858 ml   Output 2400 ml   Net -542 ml         Diet Dysphagia/Modified Consistency; Dysphagia 1-Pureed; Honey Thick Liquid     Invasive Monitoring Physical exam   N/A Physical Exam  Eyes:      Pupils: Pupils are equal, round, and reactive to light. Skin:     General: Skin is warm and dry. HENT:      Head: Normocephalic and atraumatic. Cardiovascular:      Rate and Rhythm: Normal rate and regular rhythm. Heart sounds: No murmur heard. No friction rub. No gallop. Musculoskeletal:         General: Normal range of motion. Comments: Bilateral pedal edema   Abdominal:      Palpations: Abdomen is soft. Tenderness: There is no abdominal tenderness. There is no guarding. Constitutional:       Appearance: He is obese. He is not ill-appearing. Pulmonary:      Effort: Pulmonary effort is normal. No accessory muscle usage, respiratory distress or accessory muscle usage. Breath sounds: Normal breath sounds. No wheezing, rhonchi or rales. Psychiatric:         Mood and Affect:  mood and affect abnormal.        Speech: Speech is delayed. Behavior: Behavior is slowed. Neurological:      General: No focal deficit present. Mental Status: He is oriented to person, place and time. He is lethargic. Cranial Nerves: No facial asymmetry. Sensory: Sensation is intact. Motor: Strength full and intact in all extremities. Diagnostic Studies     I have personally reviewed pertinent reports. Labs: normal beta-hCG, elevated LDH (296), and normal alpha-fetoprotein  , negative serum hCG. No new imaging. CSF culture - no growth.      Medications:  Scheduled PRN   chlorhexidine, 15 mL, Q12H ISAEL  enoxaparin, 40 mg, Q12H ISAEL  Famotidine (PF), 20 mg, BID  FLUoxetine, 10 mg, Daily  furosemide, 20 mg, BID (diuretic)  modafinil, 100 mg, Daily  polyethylene glycol, 17 g, Daily  potassium chloride, 40 mEq, BID  senna, 2 tablet, BID  thiamine, 500 mg, TID      fentanyl citrate (PF), 50 mcg, Q1H PRN  ondansetron, 4 mg, Q6H PRN       Continuous Labs:    CBC    Recent Labs     08/13/23  0748 08/14/23  0541   WBC 7.92 7.71   HGB 11.6* 11.5*   HCT 34.2* 34.4*    253     BMP    Recent Labs     08/13/23  1457 08/14/23  0541   SODIUM 132* 136   K 4.4 3.7   CL 97 99   CO2 32 33*   AGAP 3 4   BUN 9 10   CREATININE 1.03 0.94   CALCIUM 9.5 9.4       Coags    No recent results     Additional Electrolytes  Recent Labs     08/14/23  0541   MG 2.2   PHOS 4.4   CAIONIZED 1.15          Blood Gas    No recent results  No recent results LFTs  Recent Labs     08/13/23  0748 08/14/23  0541   * 140*   AST 57* 43   ALKPHOS 112 108   ALB 3.0* 3.2*   TBILI 0.55 0.66       Infectious  No recent results  Glucose  Recent Labs     08/13/23  0748 08/13/23  1457 08/14/23  0541   GLUC 111 [id number] [address]                 Samy Aggarwal MD   PGY-1

## 2023-08-15 LAB
ALBUMIN SERPL BCP-MCNC: 3.1 G/DL (ref 3.5–5)
ALP SERPL-CCNC: 103 U/L (ref 46–116)
ALT SERPL W P-5'-P-CCNC: 111 U/L (ref 12–78)
ANION GAP SERPL CALCULATED.3IONS-SCNC: 4 MMOL/L
AST SERPL W P-5'-P-CCNC: 29 U/L (ref 5–45)
BACTERIA CSF CULT: NO GROWTH
BILIRUB SERPL-MCNC: 0.41 MG/DL (ref 0.2–1)
BUN SERPL-MCNC: 7 MG/DL (ref 5–25)
CALCIUM ALBUM COR SERPL-MCNC: 10 MG/DL (ref 8.3–10.1)
CALCIUM SERPL-MCNC: 9.3 MG/DL (ref 8.3–10.1)
CHLORIDE SERPL-SCNC: 100 MMOL/L (ref 96–108)
CO2 SERPL-SCNC: 31 MMOL/L (ref 21–32)
CREAT SERPL-MCNC: 0.97 MG/DL (ref 0.6–1.3)
GFR SERPL CREATININE-BSD FRML MDRD: 100 ML/MIN/1.73SQ M
GLUCOSE SERPL-MCNC: 96 MG/DL (ref 65–140)
POTASSIUM SERPL-SCNC: 3.4 MMOL/L (ref 3.5–5.3)
PROT SERPL-MCNC: 6.6 G/DL (ref 6.4–8.4)
SODIUM SERPL-SCNC: 135 MMOL/L (ref 135–147)
VIT B1 BLD-SCNC: 84 NMOL/L (ref 66.5–200)

## 2023-08-15 PROCEDURE — 99254 IP/OBS CNSLTJ NEW/EST MOD 60: CPT | Performed by: PSYCHIATRY & NEUROLOGY

## 2023-08-15 PROCEDURE — 97116 GAIT TRAINING THERAPY: CPT

## 2023-08-15 PROCEDURE — 99233 SBSQ HOSP IP/OBS HIGH 50: CPT | Performed by: INTERNAL MEDICINE

## 2023-08-15 PROCEDURE — 97167 OT EVAL HIGH COMPLEX 60 MIN: CPT

## 2023-08-15 PROCEDURE — 80053 COMPREHEN METABOLIC PANEL: CPT | Performed by: INTERNAL MEDICINE

## 2023-08-15 PROCEDURE — 92526 ORAL FUNCTION THERAPY: CPT

## 2023-08-15 RX ORDER — POTASSIUM CHLORIDE 20 MEQ/1
20 TABLET, EXTENDED RELEASE ORAL ONCE
Status: COMPLETED | OUTPATIENT
Start: 2023-08-15 | End: 2023-08-15

## 2023-08-15 RX ORDER — LANOLIN ALCOHOL/MO/W.PET/CERES
500 CREAM (GRAM) TOPICAL 3 TIMES DAILY
Status: COMPLETED | OUTPATIENT
Start: 2023-08-16 | End: 2023-08-17

## 2023-08-15 RX ORDER — LANOLIN ALCOHOL/MO/W.PET/CERES
100 CREAM (GRAM) TOPICAL DAILY
Status: DISCONTINUED | OUTPATIENT
Start: 2023-08-17 | End: 2023-08-18 | Stop reason: HOSPADM

## 2023-08-15 RX ADMIN — SENNOSIDES 17.2 MG: 8.6 TABLET, FILM COATED ORAL at 17:01

## 2023-08-15 RX ADMIN — POTASSIUM CHLORIDE 20 MEQ: 1500 TABLET, EXTENDED RELEASE ORAL at 17:01

## 2023-08-15 RX ADMIN — POLYETHYLENE GLYCOL 3350 17 G: 17 POWDER, FOR SOLUTION ORAL at 08:24

## 2023-08-15 RX ADMIN — MODAFINIL 100 MG: 100 TABLET ORAL at 08:24

## 2023-08-15 RX ADMIN — FAMOTIDINE 20 MG: 10 INJECTION INTRAVENOUS at 17:01

## 2023-08-15 RX ADMIN — THIAMINE HYDROCHLORIDE 500 MG: 100 INJECTION, SOLUTION INTRAMUSCULAR; INTRAVENOUS at 17:01

## 2023-08-15 RX ADMIN — THIAMINE HYDROCHLORIDE 500 MG: 100 INJECTION, SOLUTION INTRAMUSCULAR; INTRAVENOUS at 00:50

## 2023-08-15 RX ADMIN — FUROSEMIDE 20 MG: 10 INJECTION, SOLUTION INTRAMUSCULAR; INTRAVENOUS at 17:01

## 2023-08-15 RX ADMIN — ENOXAPARIN SODIUM 40 MG: 40 INJECTION SUBCUTANEOUS at 08:24

## 2023-08-15 RX ADMIN — FAMOTIDINE 20 MG: 10 INJECTION INTRAVENOUS at 08:27

## 2023-08-15 RX ADMIN — ENOXAPARIN SODIUM 40 MG: 40 INJECTION SUBCUTANEOUS at 20:30

## 2023-08-15 RX ADMIN — FUROSEMIDE 20 MG: 10 INJECTION, SOLUTION INTRAMUSCULAR; INTRAVENOUS at 08:24

## 2023-08-15 RX ADMIN — THIAMINE HYDROCHLORIDE 500 MG: 100 INJECTION, SOLUTION INTRAMUSCULAR; INTRAVENOUS at 08:25

## 2023-08-15 RX ADMIN — THIAMINE HYDROCHLORIDE 500 MG: 100 INJECTION, SOLUTION INTRAMUSCULAR; INTRAVENOUS at 20:30

## 2023-08-15 RX ADMIN — SENNOSIDES 17.2 MG: 8.6 TABLET, FILM COATED ORAL at 08:24

## 2023-08-15 RX ADMIN — FLUOXETINE 10 MG: 10 CAPSULE ORAL at 08:24

## 2023-08-15 NOTE — CASE MANAGEMENT
Case Management Discharge Planning Note    Patient name Suyapa Goetz  Location [facility] 983-14 MRN [id number]  : 1985 Date 8/15/2023       Current Admission Date: 2023  Current Admission Diagnosis:Encephalopathy   Patient Active Problem List    Diagnosis Date Noted   • Encephalopathy 2023   • Acute respiratory failure ([address]) 2023   • Catatonic reaction    • Somnolence 2023   • Constipation 2023   • Wheezing 2023   • Testicular cancer ([address]) 2023   • Obesity hypoventilation syndrome ([address]) 2023   • Fall 2023   • Hyponatremia 2023   • Hypokalemia 2023   • Bipolar I disorder ([address]) 2023   • (HFpEF) heart failure with preserved ejection fraction ([address]) 2023   • Chronic diastolic heart failure ([address]) 2023   • Palpitations 2023   • LVH (left ventricular hypertrophy) 2023   • Pure hypertriglyceridemia 2023   • Cerebral gliosis 2023   • Unilateral vestibular schwannoma ([address]) 2023   • Port-A-Cath in place 2023   • Diplopia 2023   • 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   • Urinary hesitancy 15/32/6908   • Umbilical hernia without obstruction and without gangrene 12/10/2022   • Tobacco use 12/10/2022   • Retroperitoneal lymphadenopathy 12/10/2022   • Hypertension 12/10/2022      LOS (days): 4  Geometric Mean LOS (GMLOS) (days):   Days to GMLOS:     OBJECTIVE:  Risk of Unplanned Readmission Score: 28.37         Current admission status: Inpatient   Preferred Pharmacy:   [address], [address]  Phone: [phone] Fax: [phone]    Primary Care Provider: Júnior Gonzalez MD    Primary Insurance: [address] [place] [address]  Secondary Insurance:     DISCHARGE DETAILS:    Discharge planning discussed with[de-identified] Patient, Harper, and message left for Rudine Rose of Choice: Yes  Comments - Freedom of Choice: FOC discussed       Contacts  Patient Contacts: Gabriella Hopkins Pt Mom  Relationship to Patient[de-identified] Family  Contact Method: Phone  Phone Number: [phone]  Reason/Outcome: Discharge Planning      Other Referral/Resources/Interventions Provided:  Referral Comments: Recs for return to rehab, pt Mom is deciding if patient will go to another Acute rehab or DC to pt sister, [place], home. This CM called Gabriella Hopkins at [phone], received VM, pending call back.

## 2023-08-15 NOTE — PROGRESS NOTES
[address] [facility]  Progress Note  Name: Roger Pascual  MRN: [id number]  Unit/Bed#: [facility] 000-97 I Date of Admission: 8/11/2023   Date of Service: 8/15/2023 I Hospital Day: 4    Assessment/Plan   * Encephalopathy  Assessment & Plan  Patient with history of HFpEF, seminomatous testicular cancer, EtOH use, presented from [address] [place] [address] Road on 8/11 with worsening somnolence. He was recently hospitalized from 7/28 - 8/5/2023 due to frequent falls and CHF exacerbation. Initially admitted to med/surg but ICU called due to low GCS, and patient was intubated for airway protection. He was extubated the morning of 8/13 due to improving mental status. CSF studies have been mostly unrevealing. Per neurology, encephalopathy may be Wernicke's due to improvement after being given high dose thiamine replacement. Other potential causes include narcolepsy due to decreased sleep latency and increased REM on EEG. Less likely autoimmune or psychogenic. • 8/12 MRI - Stable nonspecific enhancement along the anterior floor of third ventricle/hyopthalamus. 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 Chemotherapy started in March 2023 due to enlarged para-aortic lymph nodes and increase in LDH. Chemo sopped in May 2023 due to toxicity. CTAP 5/9/2023: 2 para-aortic lymph nodes remain minimally increased in size, unchanged. One of these was biopsied and returned back as necrosis without viable tumor in January. Sen by onc - MRI and CT with no evidence of germ cell tumor recurrence  AFP - 2.54, LDH - 296, HCG neg     Plan:  • Outpatient follow-up    Hypertension  Assessment & Plan  Hx of HTN.  Only on torsemide 20 mg daily at home and has been controlled while inpatient  Now on IV lasix 20 mg BID till consistent oral intake     Plan:  • Restart Torsemide 20 mg daily from 8/16      Chronic diastolic heart failure [facility])  Assessment & Plan  Wt Readings from Last 3 Encounters:   08/05/23 (!) 154 kg (339 lb 8.1 oz)   07/19/23 (!) 140 kg (309 lb 9.6 oz)   06/19/23 (!) 137 kg (302 lb)     Patient takes 20 torsemide daily at home. Echo 6/2/2023 - LVEF 65% Mildly increased wall thickness. Mild concentric hypertrophy. ECG 8/11 showed sinus rhythm with first degree AV block  Dry weight appears to be around 140 kg based on weight at cardiologist on 7/19. Trace pedal edema on exam.      Plan:  • Change IV lasix to Torsemide from am    Fall  Assessment & Plan  Presented to the ED last admission 7/28 due to falls and headstrike. Etiology unclear at the time. Has a previous history of alcohol abuse. Per neuro may reflect Wernicke's.     Plan:  • PT/OT   • Refer to A&P for encephalopathy    Bipolar I disorder (HCC)  Assessment & Plan  Takes fluoxetine 10 mg and quetiapine 25 mg at home  Discussed with Dr Berlin Mart - hold Seroquel till mental status improves     Plan:  • Continue fluoxetine 10 mg daily    Acute respiratory failure [facility])  Assessment & Plan  Required mechanical ventilation 8/12-8/13 for airway protection. Extubated morning of 8/13 due to improving mental status.     Plan:  • Weaned to RA       VTE Pharmacologic Prophylaxis: VTE Score: 4 Moderate Risk (Score 3-4) - Pharmacological DVT Prophylaxis Ordered: enoxaparin (Lovenox). Patient Centered Rounds: Discussed with RN   Discussions with Specialists or Other Care Team Provider: Discussed with psychiatry and neurology    Education and Discussions with Family / Patient: Updated  (mother) via phone.     Total Time Spent on Date of Encounter in care of patient: 45 minutes This time was spent on one or more of the following: performing physical exam; counseling and coordination of care; obtaining or reviewing history; documenting in the medical record; reviewing/ordering tests, medications or procedures; communicating with other healthcare professionals and discussing with patient's family/caregivers. Current Length of Stay: 4 day(s)  Current Patient Status: Inpatient   Certification Statement: The patient will continue to require additional inpatient hospital stay due to Encephalopathy    Code Status: Level 1 - Full Code    Subjective:   Mental status nearly at baseline    Objective:     Vitals:   Temp (24hrs), Av.5 °F (36.9 °C), Min:98.3 °F (36.8 °C), Max:98.6 °F (37 °C)    Temp:  [98.3 °F (36.8 °C)-98.6 °F (37 °C)] 98.3 °F (36.8 °C)  HR:  [78-85] 80  Resp:  [16-18] 16  BP: (108-132)/(72-85) 128/85  SpO2:  [92 %-96 %] 96 %    Physical Exam:   Physical Exam  Vitals reviewed. HENT:      Head: Normocephalic. Nose: Nose normal.      Mouth/Throat:      Mouth: Mucous membranes are moist.   Eyes:      Extraocular Movements: Extraocular movements intact. Cardiovascular:      Rate and Rhythm: Normal rate and regular rhythm. Pulmonary:      Effort: Pulmonary effort is normal. No respiratory distress. Breath sounds: Normal breath sounds. No wheezing. Abdominal:      General: Bowel sounds are normal. There is no distension. Palpations: Abdomen is soft. Tenderness: There is no abdominal tenderness. Musculoskeletal:         General: No swelling. Cervical back: Neck supple. Skin:     General: Skin is warm and dry. Neurological:      General: No focal deficit present. Mental Status: He is alert.       Comments: Slow to respond          Additional Data:     Labs:  Results from last 7 days   Lab Units 23  1827   WBC Thousand/uL 8.82   HEMOGLOBIN g/dL 11.7*   HEMATOCRIT % 34.9*   PLATELETS Thousands/uL 263   NEUTROS PCT % 70   LYMPHS PCT % 16   MONOS PCT % 10   EOS PCT % 2     Results from last 7 days   Lab Units 08/15/23  0503 SODIUM mmol/L 135   POTASSIUM mmol/L 3.4*   CHLORIDE mmol/L 100   CO2 mmol/L 31   BUN mg/dL 7   CREATININE mg/dL 0.97   ANION GAP mmol/L 4   CALCIUM mg/dL 9.3   ALBUMIN g/dL 3.1*   TOTAL BILIRUBIN mg/dL 0.41   ALK PHOS U/L 103   ALT U/L 111*   AST U/L 29   GLUCOSE RANDOM mg/dL 96         Results from last 7 days   Lab Units 08/11/23  1626   POC GLUCOSE mg/dl 89         Results from last 7 days   Lab Units 08/14/23  1827 08/11/23  1656   LACTIC ACID mmol/L  --  1.1   PROCALCITONIN ng/ml 0.09 0.11       Lines/Drains:  Invasive Devices     Central Venous Catheter Line  Duration           Port A Cath 03/14/23 Right Internal jugular 154 days          Peripheral Intravenous Line  Duration           Peripheral IV 08/12/23 Left;Dorsal (posterior) Hand 3 days    Peripheral IV 08/15/23 Right;Ventral (anterior) Wrist <1 day                Central Line:  Goal for removal: Maintain port    Recent Cultures (last 7 days):   Results from last 7 days   Lab Units 08/11/23  2234   GRAM STAIN RESULT  Rare Polys  Rare Mononuclear Cells  No bacteria seen       Last 24 Hours Medication List:   Current Facility-Administered Medications   Medication Dose Route Frequency Provider Last Rate   • enoxaparin  40 mg Subcutaneous Q12H [address] Gretchen Forrest MD     • Famotidine (PF)  20 mg Intravenous BID Gretchen Forrest MD     • FLUoxetine  10 mg Oral Daily Gretchen Forrest MD     • furosemide  20 mg Intravenous BID (diuretic) Michelle Zavala MD     • ondansetron  4 mg Intravenous Q6H PRN Michelle Zavala MD     • polyethylene glycol  17 g Oral Daily Reese Herring, DO     • potassium chloride  20 mEq Oral Once Loi Damico MD     • senna  2 tablet Oral BID Reese Herring, DO     • thiamine  500 mg Intravenous TID Dipika Samuels  mg (08/15/23 0825)   • [START ON 8/16/2023] thiamine  500 mg Oral TID Dipika Samuels MD      Followed by   • [START ON 8/17/2023] thiamine  100 mg Oral Daily Dipika Samuels MD          Today, Patient Was Seen By: Loi Damico MD    **Please Note: This note may have been constructed using a voice recognition system. **

## 2023-08-15 NOTE — PHYSICAL THERAPY NOTE
Physical Therapy Treatment Note    Patient's Name: Yary Choi  : 1985     08/15/23 1218   PT Last Visit   PT Visit Date 08/15/23   Note Type   Note Type Treatment   Pain Assessment   Pain Assessment Tool 0-10   Pain Score No Pain   Restrictions/Precautions   Weight Bearing Precautions Per Order No   Other Precautions Cognitive; Chair Alarm; Bed Alarm; Fall Risk   General   Chart Reviewed Yes   Response to Previous Treatment Patient with no complaints from previous session. Family/Caregiver Present No   Cognition   Orientation Level Oriented to person;Oriented to place;Oriented to time   Comments flat affect, slow processing + response time   Subjective   Subjective Pt agreeable to mobilize. Bed Mobility   Supine to Sit 4  Minimal assistance   Additional items Assist x 1; Increased time required;Verbal cues   Sit to Supine 4  Minimal assistance   Additional items Assist x 1   Additional Comments Pt greeted in supine. Transfers   Sit to Stand 4  Minimal assistance   Additional items Assist x 1; Increased time required;Verbal cues   Stand to Sit 4  Minimal assistance   Additional items Assist x 1; Increased time required;Verbal cues   Additional Comments no AD   Ambulation/Elevation   Gait pattern Inconsistent alka;Decreased foot clearance; Improper Weight shift  (decreased L foot clearance, L lateral lean w/ near LOB's to the L)   Gait Assistance 4  Minimal assist   Additional items Assist x 1;Verbal cues; Tactile cues  (+ 2nd person close SBA)   Assistive Device None   Distance 75'   Stair Management Assistance Not tested   Ambulation/Elevation Additional Comments unsteady   Balance   Static Sitting Fair   Dynamic Sitting Fair -   Static Standing Poor +   Dynamic Standing Poor   Ambulatory Poor   Endurance Deficit   Endurance Deficit Yes   Endurance Deficit Description fatigue, weakness, heavy breathing w/ stable vitals   Activity Tolerance   Activity Tolerance Patient limited by fatigue   Medical Staff Made Aware OT Daniella Allen, OT Lorenza Bullard   Nurse Made Aware yes - cleared for thearpy   Assessment   Prognosis Good   Problem List Decreased endurance; Impaired balance;Decreased mobility; Decreased cognition; Impaired judgement;Decreased safety awareness   Assessment Pt demosntrated progress today w/ increased distance covered during gait training. Pt still requires cues for pacing + adequate foot clearance LLE. Pt still unsteady. Continue to recommend rehab upon d/c.   Barriers to Discharge Decreased caregiver support; Inaccessible home environment   Goals   Patient Goals none expressed   PT Treatment Day 1   Plan   Treatment/Interventions Functional transfer training;LE strengthening/ROM; Therapeutic exercise;Elevations; Endurance training;Patient/family training;Equipment eval/education; Bed mobility;Gait training; Compensatory technique education;Spoke to nursing;OT   Progress Progressing toward goals   PT Frequency 2-3x/wk   Recommendation   PT Discharge Recommendation Post acute rehabilitation services  (return to [facility])   [facility] Mobility Inpatient   Turning in Flat Bed Without Bedrails 3   Lying on Back to Sitting on Edge of Flat Bed Without Bedrails 3   Moving Bed to Chair 3   Standing Up From Chair Using Arms 3   Walk in Room 3   Climb 3-5 Stairs With Railing 2   Basic Mobility Inpatient Raw Score 17   Basic Mobility Standardized Score 39.67   Highest Level Of Mobility   JH-HLM Goal 5: Stand one or more mins   JH-HLM Achieved 7: Walk 25 feet or more   Education   Education Provided Mobility training   Patient Reinforcement needed   End of Consult   Patient Position at End of Consult Supine;Bed/Chair alarm activated; All needs within reach     Pedro Richardson, PT, DPT

## 2023-08-15 NOTE — PLAN OF CARE
Problem: OCCUPATIONAL THERAPY ADULT  Goal: Performs self-care activities at highest level of function for planned discharge setting. See evaluation for individualized goals. Description: Treatment Interventions: ADL retraining, Functional transfer training, Visual perceptual retraining, UE strengthening/ROM, Endurance training, Cognitive reorientation, Patient/family training, Equipment evaluation/education, Compensatory technique education, Energy conservation, Activityengagement, Continued evaluation          See flowsheet documentation for full assessment, interventions and recommendations. Note: Limitation: Decreased ADL status, Decreased Safe judgement during ADL, Decreased cognition, Decreased endurance, Decreased self-care trans, Decreased high-level ADLs  Prognosis: Fair  Assessment: Pt is a 41 y.o. male who was admitted to [facility] on 8/11/2023 with change in mental status, new onset somnolence from this morning,  exhibiting bizarre behavior over the past week,  Encephalopathy, obesity with hypoventilation syndrome, seminoma of left testis, HTN, bipolar disorder, History of a fall and currently in STR, CHF,  . Patient At baseline (prior to [facility]) pt was completing I with ADL's/IADL's, no AD with functional mobility +drives. Pt lives with spouse and toddler son in a [facility] with bed/bathroom on 2nd floor. Currently pt requires I with grooming/self feeding, min a UB, mod/max a LB for overall ADLS and min a x 1 without AD and SBA for safety for functional mobility/transfers. Pt currently presents with impairments in the following categories -difficulty performing ADLS, difficulty performing IADLS , limited insight into deficits, compliance, flat affect and decreased initiation and engagement  activity tolerance, endurance, standing balance/tolerance, sitting balance/tolerance, memory, insight, safety , judgement , attention , sequencing , communication and interpersonal skills.  These impairments, as well as pt's fatigue, decreased caregiver support and risk for falls  limit pt's ability to safely engage in all baseline areas of occupation, includingbathing, dressing, toileting, functional mobility/transfers, community mobility, care of pets , house maintenance, medication management, meal prep, work/volunteer work , care of children, social participation  and leisure activities  The patient's raw score on the AM-PAC Daily Activity Inpatient Short Form is 17. A raw score of less than 19 suggests the patient may benefit from discharge to post-acute rehabilitation services. Please refer to the recommendation of the Occupational Therapist for safe discharge planning. From OT standpoint, recommend return to STR upon D/C. OT will continue to follow to address the below stated goals.      OT Discharge Recommendation: Return to facility with rehabilitation services

## 2023-08-15 NOTE — OCCUPATIONAL THERAPY NOTE
Occupational Therapy Evaluation     Patient Name: Lois Mendes  KVBQS'L Date: 8/15/2023  Problem List  Principal Problem:    Encephalopathy  Active Problems:    Hypertension    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HCC)    Cerebral gliosis    Unilateral vestibular schwannoma (HCC)    Chronic diastolic heart failure (HCC)    Fall    Hyponatremia    Bipolar I disorder (HCC)    Obesity hypoventilation syndrome (HCC)    Somnolence    Acute respiratory failure (HCC)    Catatonic reaction    Past Medical History  Past Medical History:   Diagnosis Date   • Anxiety    • Cancer ([address])    • Depression    • Psychiatric disorder     bipolar     Past Surgical History  Past Surgical History:   Procedure Laterality Date   • IR BIOPSY LYMPH NODE  1/20/2023   • IR PORT PLACEMENT  3/14/2023   • ORCHIECTOMY Left 12/14/2022    Procedure: ORCHIECTOMY INGUINAL;  Surgeon: Rosa Baum MD;  Location: [facility];  Service: Urology         08/15/23 1159   OT Last Visit   OT Visit Date 08/15/23   Note Type   Note type Evaluation   Pain Assessment   Pain Assessment Tool 0-10   Pain Score No Pain   Restrictions/Precautions   Weight Bearing Precautions Per Order No   Other Precautions Cognitive; Chair Alarm; Bed Alarm;Telemetry; Fall Risk   Home Living   Type of [address] [place] [address] Two level;Bed/bath upstairs   Bathroom Shower/Tub Tub/shower unit   Bathroom Toilet Standard   Bathroom Equipment (No DME at baseline)   Home Equipment (No AD)   Prior Function   Level of [place] Independent with ADLs; Independent with functional mobility   Lives With Son;Significant other  (4 year old son)   Receives Help From Family   IADLs Independent with driving; Independent with meal prep; Independent with medication management   Falls in the last 6 months 1 to 4  (one)   Vocational Unemployed   Lifestyle   Autonomy Pt admitted from [facility] prior to acute rehab pt was I with ADL's/IaDL's (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) no AD with functional mobiilty +drives Reciprocal Relationships SO, family   Service to Others unemployed    Intrinsic Gratification spending time with family   General   Family/Caregiver Present No   ADL   Eating Assistance 7  Independent   Grooming Assistance 7  Independent   UB Bathing Assistance 5  Supervision/Setup   LB Bathing Assistance 3  Moderate Assistance   UB Dressing Assistance 4  Minimal Assistance   LB [address] CARIE Orestes Final 3  Moderate Assistance   Toileting Assistance  4  Minimal Assistance   Bed Mobility   Supine to Sit 4  Minimal assistance   Additional items Assist x 1   Sit to Supine 4  Minimal assistance   Additional items Assist x 1   Transfers   Sit to Stand 4  Minimal assistance   Additional items Assist x 1   Stand to Sit 4  Minimal assistance   Additional items Assist x 1   Additional Comments no AD   Functional Mobility   Functional Mobility 4  Minimal assistance   Additional Comments min a x 1 with SBA of another to ensure safety unsteady, no overt LOB, fair acitivty tolerance, +heavy breathing at end of mobility task at EOB (pt with obesity hypoventilation syndrome may contribute) vitals WFL. On room air. Balance   Static Sitting Fair   Dynamic Sitting Fair -   Static Standing Poor +   Dynamic Standing Poor   Ambulatory Poor   Activity Tolerance   Activity Tolerance Patient limited by fatigue;Patient tolerated treatment well   Medical Staff Made Aware PT Regino Spine due to the patient's co-morbidities, clinically unstable presentation, and present impairments which are a regression from the patient's baseline.     Nurse Made Aware RN cleared pt for therapy   RUE Assessment   RUE Assessment WFL   LUE Assessment   LUE Assessment WFL   Hand Function   Gross Motor Coordination Functional   Fine Motor Coordination Functional   Vision-Basic Assessment   Patient Visual Report Diplopia   Vision - Complex Assessment   Ocular Range of Motion Intact   Tracking Intact   Acuity Able to read clock/calendar on wall without difficulty; Able to read employee name badge without difficulty   Diplopia Assessment Disappears wtih one eye closed;Objects split side to side;Present all the time/all directions   Cognition   Overall Cognitive Status (S)  Impaired   Arousal/Participation Alert; Responsive   Attention Attends with cues to redirect   Orientation Level Oriented to person;Oriented to place;Oriented to time  (grossly to date/time with increased time and choice cues)   Memory Decreased recall of precautions;Decreased recall of recent events;Decreased short term memory   Following Commands Follows one step commands with increased time or repetition   Comments pt presents with flat and sad affect (pt denies, pastoral care offered for supportive listening pt declined need), minimally verbal (yes/no answers) significant slow processing/response time, impaired memory, unable to follow >2-3 steps functional commands with session. Formal cognitive evaluation consulted received- will assess with next treatment session. Assessment   Limitation Decreased ADL status; Decreased Safe judgement during ADL;Decreased cognition;Decreased endurance;Decreased self-care trans;Decreased high-level ADLs   Prognosis Fair   Assessment Pt is a 41 y.o. male who was admitted to [facility] on 8/11/2023 with change in mental status, new onset somnolence from this morning,  exhibiting bizarre behavior over the past week,  Encephalopathy, obesity with hypoventilation syndrome, seminoma of left testis, HTN, bipolar disorder, History of a fall and currently in STR, CHF,  . Patient At baseline (prior to [facility]) pt was completing I with ADL's/IADL's, no AD with functional mobility +drives. Pt lives with spouse and toddler son in a [facility] with bed/bathroom on 2nd floor. Currently pt requires I with grooming/self feeding, min a UB, mod/max a LB for overall ADLS and min a x 1 without AD and SBA for safety for functional mobility/transfers.  Pt currently presents with impairments in the following categories -difficulty performing ADLS, difficulty performing IADLS , limited insight into deficits, compliance, flat affect and decreased initiation and engagement  activity tolerance, endurance, standing balance/tolerance, sitting balance/tolerance, memory, insight, safety , judgement , attention , sequencing , communication and interpersonal skills. These impairments, as well as pt's fatigue, decreased caregiver support and risk for falls  limit pt's ability to safely engage in all baseline areas of occupation, includingbathing, dressing, toileting, functional mobility/transfers, community mobility, care of pets , house maintenance, medication management, meal prep, work/volunteer work , care of children, social participation  and leisure activities  The patient's raw score on the AM-PAC Daily Activity Inpatient Short Form is 17. A raw score of less than 19 suggests the patient may benefit from discharge to post-acute rehabilitation services. Please refer to the recommendation of the Occupational Therapist for safe discharge planning. From OT standpoint, recommend return to [facility] upon D/C. OT will continue to follow to address the below stated goals. Goals   Patient Goals none stated   LTG Time Frame 10-14   Long Term Goal #1 see goals below   Plan   Treatment Interventions ADL retraining;Functional transfer training;Visual perceptual retraining;UE strengthening/ROM; Endurance training;Cognitive reorientation;Patient/family training;Equipment evaluation/education; Compensatory technique education; Energy conservation; Activityengagement;Continued evaluation   Goal Expiration Date 08/29/23   OT Frequency 2-3x/wk   Recommendation   OT Discharge Recommendation Return to facility with rehabilitation services   [facility] Daily Activity Inpatient   Lower Body Dressing 2   Bathing 2   Toileting 2   Upper Body Dressing 3   Grooming 4   Eating 4   Daily Activity Raw Score 17   Daily Activity Standardized Score (Calc for Raw Score >=11) 37.26   AM-PAC Applied Cognition Inpatient   Following a Speech/Presentation 1   Understanding Ordinary Conversation 3   Taking Medications 2   Remembering Where Things Are Placed or Put Away 3   Remembering List of 4-5 Errands 2   Taking Care of Complicated Tasks 1   Applied Cognition Raw Score 12   Applied Cognition Standardized Score 28.82   End of Consult   Patient Position at End of Consult All needs within reach; Supine;Bed/Chair alarm activated   Nurse Communication Nurse aware of consult    Occupational Therapy Goals:    *S with bed mobility to engage in functional tasks. *S Adl's after setup with use of AE PRN  *S toileting and clothing management   *S functional mobility and transfers to/from all surfaces with Fair + dynamic balance and safety for participation in dynamic adls and iadl tasks   *Demonstrate good carryover with safe use of RW during functional tasks   *Assess DME needs   *Increase activity tolerance to 25-30 minutes for participation in adls and enjoyable activities  *Pt to participate in further cognitive testing with good attention and participation to assist with safe d/c recommendations  *Demonstrate good carryover of pt/family education and training with good tolerance for increased safety and independence with ADL's/ADl's. *Pt will improve standing balance to 4-5 minutes with functional tasks to increase I with toileting/transfers. *Patient will demonstrate 100% carryover of energy conservation techniques t/o functional I/ADL/leisure tasks w/o cues s/p skilled education to increase endurance during functional tasks  *Pt will follow 100% simple one step verbal commands and be A/Ox4 consistently t/o use of external environmental cues w/ mod I  .     Edouard Ill MOT, OTR/L

## 2023-08-15 NOTE — PLAN OF CARE
Problem: PHYSICAL THERAPY ADULT  Goal: Performs mobility at highest level of function for planned discharge setting. See evaluation for individualized goals. Description: Treatment/Interventions: Functional transfer training, LE strengthening/ROM, Therapeutic exercise, Elevations, Endurance training, Cognitive reorientation, Patient/family training, Equipment eval/education, Bed mobility, Gait training, Spoke to nursing, Spoke to case management, OT, ST, Family  Equipment Recommended:  (TBD)       See flowsheet documentation for full assessment, interventions and recommendations. Outcome: Progressing  Note: Prognosis: Good  Problem List: Decreased endurance, Impaired balance, Decreased mobility, Decreased cognition, Impaired judgement, Decreased safety awareness  Assessment: Pt demosntrated progress today w/ increased distance covered during gait training. Pt still requires cues for pacing + adequate foot clearance LLE. Pt still unsteady. Continue to recommend rehab upon d/c.  Barriers to Discharge: Decreased caregiver support, Inaccessible home environment     PT Discharge Recommendation: Post acute rehabilitation services (return to [facility])    See flowsheet documentation for full assessment.

## 2023-08-15 NOTE — SPEECH THERAPY NOTE
Speech-Language Pathology Progress Note      Patient Name: Cristopher Silva    SVWTD'U Date: 8/15/2023      Subjective:  Pt was awake and slow to respond. He was sitting upright in bed. Patient stated "siding" when asked what he does for work. Objective:  Pt was seen today for dysphagia therapy. Current diet is puree with honey thick liquids. Pt was on room air. Focus of today's session was to maximize PO intake safety, improve pt's self monitoring and determine appropriateness for MBS. Textures offered today included puree and honey thick liquid via tsp and via cup. Swallow function:   Bolus retrieval and control were weak but adequate for puree/HTL. Manipulation and AP transfer were slow. Pharyngeal swallow initiation was sluggish and delayed. Hyolaryngeal excursion was reduced. Cough occurred with honey thick liquid via tsp x1. Fed by SLP initially, however mid way through session pt was set up and able to complete his lunch by self feeding given cues. Assessment:  Swallow function is stable with current diet. Diet texture and liquid consistency remains appropriate at this time. CXR 8/14/23 IMPRESSION:  Persistent linear density at the left base unchanged since July 28, 2023 suggesting chronic atelectasis or scarring. No new infiltrates are seen    Pt now appropriate for participation in [facility]. Orders requested. Plan:  Continue puree and honey thick liquids. Continue ST follow up. Subsequent sessions to focus on maximizing PO intake safety, determining potential for diet texture advancement and MBS to be completed tomorrow 8/16/23.

## 2023-08-15 NOTE — CONSULTS
Consultation - [address] 41 y.o. male MRN: [id number]  Unit/Bed#: [facility] 704-01 Encounter: [id number]      Chief Complaint: I am feeling better    History of Present Illness   Physician Requesting Consult: Kana Jose MD  Reason for Consult / Principal Problem: Flat affect, labile mood, neurology recommends psychiatry evaluation, Dx. Encephalopathy     Fidelina Stafford is a 41 y.o. male with history of seminoma of left testis, hypertension, cerebral gliosis, bipolar disorder, chronic diastolic heart failure presents with somnolence and changes in mental status. Patient has been evaluated for possible symptoms of bipolar, when I saw the patient he was in bed, he answered few questions, most of them were "no". He knows he is in the hospital but cannot tell me why, he cannot tell me if he takes medications at home. He states that he lives with his mother but I think he lives with someone else. He does not recall seeing a psychiatrist. According to the records his primary physician prescribes Seroquel and Prozac. After reviewing multiple records, I could not find any psychiatric admission. In 2017 he had one visit to the ED due to substance abuse but no other visits related to mental health. Patient denies any hallucinations, denies suicidal ideation, intent, or plan. Psychiatric Review Of Systems:  sleep: yes  appetite changes: no  weight changes: no  energy/anergy: no  interest/pleasure/anhedonia: no  somatic symptoms: no  anxiety/panic: no  emily: no  guilty/hopeless: no  self injurious behavior/risky behavior: no    Historical Information   Past Psychiatric History:   None  Currently in treatment with primary doctor. Past Suicide attempts: unkown  Past Violent behavior: unkown  Past Psychiatric medication trial: Prozac and Seroquel     Substance Abuse History: According to the record he has remote history of cocaine and Marijuana and alcohol use.  Has been sober for several years     I have assessed this patient for substance use within the past 12 months     History of IP/OP rehabilitation program:unknown  Smoking history: unknown  Family Psychiatric History:   unkown    Social History  Education: unknown  Learning Disabilities: unknown  Marital history: single  Living arrangement, social support: unknown. Occupational History: unknown occupation  Functioning Relationships: good support system.   Other Pertinent History: unknown    Traumatic History:   Abuse: unknown  Other Traumatic Events: unknown    Past Medical History:   Diagnosis Date   • Anxiety    • Cancer ([address])    • Depression    • Psychiatric disorder     bipolar       Medical Review Of Systems:  Review of Systems - unable to assess due to mental status     Meds/Allergies   current meds:   Current Facility-Administered Medications   Medication Dose Route Frequency   • enoxaparin (LOVENOX) subcutaneous injection 40 mg  40 mg Subcutaneous Q12H [address]   • Famotidine (PF) (PEPCID) injection 20 mg  20 mg Intravenous BID   • FLUoxetine (PROzac) capsule 10 mg  10 mg Oral Daily   • furosemide (LASIX) injection 20 mg  20 mg Intravenous BID (diuretic)   • ondansetron (ZOFRAN) injection 4 mg  4 mg Intravenous Q6H PRN   • polyethylene glycol (MIRALAX) packet 17 g  17 g Oral Daily   • senna (SENOKOT) tablet 17.2 mg  2 tablet Oral BID   • thiamine (VITAMIN B1) 500 mg in sodium chloride 0.9 % 50 mL IVPB  500 mg Intravenous TID     No Known Allergies    Objective   Vital signs in last 24 hours:  Temp:  [98.3 °F (36.8 °C)-98.6 °F (37 °C)] 98.3 °F (36.8 °C)  HR:  [78-85] 80  Resp:  [16-18] 16  BP: (108-132)/(72-85) 128/85      Intake/Output Summary (Last 24 hours) at 8/15/2023 1512  Last data filed at 8/15/2023 0900  Gross per 24 hour   Intake 360 ml   Output 675 ml   Net -315 ml       Mental Status Evaluation:  Appearance:  age appropriate and overweight   Behavior:  normal   Speech:  soft   Mood:  depressed   Affect:  constricted   Language: unable to asess   Thought Process:  concrete   Associations: concrete associations   Thought Content:  normal   Perceptual Disturbances: None   Risk Potential: Suicidal Ideations none, Homicidal Ideations none and Potential for Aggression No   Sensorium:  person   Memory:  patient does not answer   Cognition:  patient does not answer   Consciousness:  awake    Attention: attention span appeared shorter than expected for age   Intellect: not examined   Fund of Knowledge: awareness of current events: unable to asess   Insight:  limited   Judgment: limited   Muscle Strength and Tone: within normal limits   Gait/Station: unable to assess, patient is in bed   Motor Activity: no abnormal movements     Lab Results:    I have personally reviewed all pertinent laboratory/tests results. Labs in last 72 hours:   Recent Labs     08/13/23  1457 08/14/23  0541 08/14/23  1827 08/15/23  0503   WBC  --    < > 8.82  --    RBC  --    < > 3.56*  --    HGB  --    < > 11.7*  --    HCT  --    < > 34.9*  --    PLT  --    < > 263  --    RDW  --    < > 14.4  --    NEUTROABS  --    < > 6.26  --    SODIUM 132*   < >  --  135   K 4.4   < >  --  3.4*   CL 97   < >  --  100   CO2 32   < >  --  31   BUN 9   < >  --  7   CREATININE 1.03   < >  --  0.97   GLUC 134   < >  --  96   CALCIUM 9.5   < >  --  9.3   AST  --    < >  --  29   ALT  --    < >  --  111*   ALKPHOS  --    < >  --  103   TP  --    < >  --  6.6   ALB  --    < >  --  3.1*   TBILI  --    < >  --  0.41   PREGSERUM Negative  --   --   --     < > = values in this interval not displayed. Recent Imaging Studies: Procedure: XR chest portable    Result Date: 8/15/2023  Narrative: CHEST INDICATION:   fever, suspicion for aspiration, pneumonia? . COMPARISON: August 12, 2023 and July 28, 2023 EXAM PERFORMED/VIEWS:  XR CHEST PORTABLE FINDINGS: Right-sided infusion port catheter device present, stable in position. Cardiomediastinal silhouette appears unremarkable. Limited inspiration. Persistent linear density at the left base unchanged since July 28, 2023 suggestive of some chronic atelectasis or scarring. No new infiltrate seen. No pleural fluid or pneumothorax identified. Old right clavicle fracture deformity noted. Impression: Persistent linear density at the left base unchanged since July 28, 2023 suggesting chronic atelectasis or scarring. No new infiltrates are seen Workstation performed: [id number]     Code Status: )Level 1 - Full Code    Assessment/Plan     Assessment:  David Croft is a 41 y.o. male with history of seminoma of left testis, hypertension, cerebral gliosis, bipolar disorder, chronic diastolic heart failure presents with somnolence and changes in mental status. Patient has been evaluated for possible symptoms of bipolar. When I saw the patient he was in bed, he only answer few question with "no". After reviewing multiple records he appeared very close to baseline. Diagnosis depressive disorder unspecific type   Plan: Continue medical management   Continue Prozac 10mg daily PO   Hold Seroquel until mental status improves   Discussed with the primary team and neurology   I will follow up PRN     Risks, benefits and possible side effects of Medications:   Patient does not verbalize understanding at this time and will require further explanation. After discussing with the family patient does not have any symptoms of bipolar disorder, his mood started changing when he had the chemotherapy, when he was depressed.    Diagnosis will be  Depressive disorder unspecified type instead  of bipolar disorder    Taryn Nam MD

## 2023-08-15 NOTE — PLAN OF CARE
Problem: Potential for Falls  Goal: Patient will remain free of falls  Description: INTERVENTIONS:  - Educate patient/family on patient safety including physical limitations  - Instruct patient to call for assistance with activity   - Consult OT/PT to assist with strengthening/mobility   - Keep Call bell within reach  - Keep bed low and locked with side rails adjusted as appropriate  - Keep care items and personal belongings within reach  - Initiate and maintain comfort rounds  - Make Fall Risk Sign visible to staff  - Offer Toileting every 2 Hours, in advance of need  - Initiate/Maintain bed alarm  - Obtain necessary fall risk management equipment: in room   - Apply yellow socks and bracelet for high fall risk patients  - Consider moving patient to room near nurses station  Outcome: Progressing     Problem: MOBILITY - ADULT  Goal: Maintain or return to baseline ADL function  Description: INTERVENTIONS:  -  Assess patient's ability to carry out ADLs; assess patient's baseline for ADL function and identify physical deficits which impact ability to perform ADLs (bathing, care of mouth/teeth, toileting, grooming, dressing, etc.)  - Assess/evaluate cause of self-care deficits   - Assess range of motion  - Assess patient's mobility; develop plan if impaired  - Assess patient's need for assistive devices and provide as appropriate  - Encourage maximum independence but intervene and supervise when necessary  - Involve family in performance of ADLs  - Assess for home care needs following discharge   - Consider OT consult to assist with ADL evaluation and planning for discharge  - Provide patient education as appropriate  Outcome: Progressing  Goal: Maintains/Returns to pre admission functional level  Description: INTERVENTIONS:  - Perform BMAT or MOVE assessment daily.   - Set and communicate daily mobility goal to care team and patient/family/caregiver.    - Collaborate with rehabilitation services on mobility goals if consulted  - Perform Range of Motion 3 times a day. - Reposition patient every 2 hours.   - Dangle patient 3 times a day  - Stand patient 3 times a day  - Ambulate patient 3 times a day  - Out of bed to chair 3 times a day   - Out of bed for meals 3 times a day  - Out of bed for toileting  - Record patient progress and toleration of activity level   Outcome: Progressing     Problem: Prexisting or High Potential for Compromised Skin Integrity  Goal: Skin integrity is maintained or improved  Description: INTERVENTIONS:  - Identify patients at risk for skin breakdown  - Assess and monitor skin integrity  - Assess and monitor nutrition and hydration status  - Monitor labs   - Assess for incontinence   - Turn and reposition patient  - Assist with mobility/ambulation  - Relieve pressure over bony prominences  - Avoid friction and shearing  - Provide appropriate hygiene as needed including keeping skin clean and dry  - Evaluate need for skin moisturizer/barrier cream  - Collaborate with interdisciplinary team   - Patient/family teaching  - Consider wound care consult   Outcome: Progressing     Problem: PAIN - ADULT  Goal: Verbalizes/displays adequate comfort level or baseline comfort level  Description: Interventions:  - Encourage patient to monitor pain and request assistance  - Assess pain using appropriate pain scale  - Administer analgesics based on type and severity of pain and evaluate response  - Implement non-pharmacological measures as appropriate and evaluate response  - Consider cultural and social influences on pain and pain management  - Notify physician/advanced practitioner if interventions unsuccessful or patient reports new pain  Outcome: Progressing     Problem: INFECTION - ADULT  Goal: Absence or prevention of progression during hospitalization  Description: INTERVENTIONS:  - Assess and monitor for signs and symptoms of infection  - Monitor lab/diagnostic results  - Monitor all insertion sites, i.e. indwelling lines, tubes, and drains  - Monitor endotracheal if appropriate and nasal secretions for changes in amount and color  - Ramsey appropriate cooling/warming therapies per order  - Administer medications as ordered  - Instruct and encourage patient and family to use good hand hygiene technique  - Identify and instruct in appropriate isolation precautions for identified infection/condition  Outcome: Progressing     Goal: Maintain or return to baseline ADL function  Description: INTERVENTIONS:  -  Assess patient's ability to carry out ADLs; assess patient's baseline for ADL function and identify physical deficits which impact ability to perform ADLs (bathing, care of mouth/teeth, toileting, grooming, dressing, etc.)  - Assess/evaluate cause of self-care deficits   - Assess range of motion  - Assess patient's mobility; develop plan if impaired  - Assess patient's need for assistive devices and provide as appropriate  - Encourage maximum independence but intervene and supervise when necessary  - Involve family in performance of ADLs  - Assess for home care needs following discharge   - Consider OT consult to assist with ADL evaluation and planning for discharge  - Provide patient education as appropriate  Outcome: Progressing    Problem: DISCHARGE PLANNING  Goal: Discharge to home or other facility with appropriate resources  Description: INTERVENTIONS:  - Identify barriers to discharge w/patient and caregiver  - Arrange for needed discharge resources and transportation as appropriate  - Identify discharge learning needs (meds, wound care, etc.)  - Arrange for interpretive services to assist at discharge as needed  - Refer to Case Management Department for coordinating discharge planning if the patient needs post-hospital services based on physician/advanced practitioner order or complex needs related to functional status, cognitive ability, or social support system  Outcome: Progressing     Problem: Knowledge Deficit  Goal: Patient/family/caregiver demonstrates understanding of disease process, treatment plan, medications, and discharge instructions  Description: Complete learning assessment and assess knowledge base. Interventions:  - Provide teaching at level of understanding  - Provide teaching via preferred learning methods  Outcome: Progressing     Problem: Nutrition/Hydration-ADULT  Goal: Nutrient/Hydration intake appropriate for improving, restoring or maintaining nutritional needs  Description: Monitor and assess patient's nutrition/hydration status for malnutrition. Collaborate with interdisciplinary team and initiate plan and interventions as ordered. Monitor patient's weight and dietary intake as ordered or per policy. Utilize nutrition screening tool and intervene as necessary. Determine patient's food preferences and provide high-protein, high-caloric foods as appropriate.      INTERVENTIONS:  - Monitor oral intake, urinary output, labs, and treatment plans  - Assess nutrition and hydration status and recommend course of action  - Evaluate amount of meals eaten  - Assist patient with eating if necessary   - Allow adequate time for meals  - Recommend/ encourage appropriate diets, oral nutritional supplements, and vitamin/mineral supplements  - Order, calculate, and assess calorie counts as needed  - Recommend, monitor, and adjust tube feedings and TPN/PPN based on assessed needs  - Assess need for intravenous fluids  - Provide specific nutrition/hydration education as appropriate  - Include patient/family/caregiver in decisions related to nutrition  Outcome: Progressing     Problem: COPING  Goal: Pt/Family able to verbalize concerns and demonstrate effective coping strategies  Description: INTERVENTIONS:  - Assist patient/family to identify coping skills, available support systems and cultural and spiritual values  - Provide emotional support, including active listening and acknowledgement of concerns of patient and caregivers  - Reduce environmental stimuli, as able  - Provide patient education  - Assess for spiritual pain/suffering and initiate spiritual care, including notification of Pastoral Care or melody based community as needed  - Assess effectiveness of coping strategies  Outcome: Progressing  Goal: Will report anxiety at manageable levels  Description: INTERVENTIONS:  - Administer medication as ordered  - Teach and encourage coping skills  - Provide emotional support  - Assess patient/family for anxiety and ability to cope  Outcome: Progressing     Problem: NEUROSENSORY - ADULT  Goal: Achieves stable or improved neurological status  Description: INTERVENTIONS  - Monitor and report changes in neurological status  - Monitor vital signs such as temperature, blood pressure, glucose, and any other labs ordered   - Initiate measures to prevent increased intracranial pressure  - Monitor for seizure activity and implement precautions if appropriate      Outcome: Progressing  Goal: Achieves maximal functionality and self care  Description: INTERVENTIONS  - Monitor swallowing and airway patency with patient fatigue and changes in neurological status  - Encourage and assist patient to increase activity and self care.    - Encourage visually impaired, hearing impaired and aphasic patients to use assistive/communication devices  Outcome: Progressing     Problem: CARDIOVASCULAR - ADULT  Goal: Maintains optimal cardiac output and hemodynamic stability  Description: INTERVENTIONS:  - Monitor I/O, vital signs and rhythm  - Monitor for S/S and trends of decreased cardiac output  - Administer and titrate ordered vasoactive medications to optimize hemodynamic stability  - Assess quality of pulses, skin color and temperature  - Assess for signs of decreased coronary artery perfusion  - Instruct patient to report change in severity of symptoms  Outcome: Progressing  Goal: Absence of cardiac dysrhythmias or at baseline rhythm  Description: INTERVENTIONS:  - Continuous cardiac monitoring, vital signs, obtain 12 lead EKG if ordered  - Administer antiarrhythmic and heart rate control medications as ordered  - Monitor electrolytes and administer replacement therapy as ordered  Outcome: Progressing     Problem: RESPIRATORY - ADULT  Goal: Achieves optimal ventilation and oxygenation  Description: INTERVENTIONS:  - Assess for changes in respiratory status  - Assess for changes in mentation and behavior  - Position to facilitate oxygenation and minimize respiratory effort  - Oxygen administered by appropriate delivery if ordered  - Initiate smoking cessation education as indicated  - Encourage broncho-pulmonary hygiene including cough, deep breathe, Incentive Spirometry  - Assess the need for suctioning and aspirate as needed  - Assess and instruct to report SOB or any respiratory difficulty  - Respiratory Therapy support as indicated  Outcome: Progressing     Problem: GASTROINTESTINAL - ADULT  Goal: Maintains or returns to baseline bowel function  Description: INTERVENTIONS:  - Assess bowel function  - Encourage oral fluids to ensure adequate hydration  - Administer IV fluids if ordered to ensure adequate hydration  - Administer ordered medications as needed  - Encourage mobilization and activity  - Consider nutritional services referral to assist patient with adequate nutrition and appropriate food choices  Outcome: Progressing  Goal: Maintains adequate nutritional intake  Description: INTERVENTIONS:  - Monitor percentage of each meal consumed  - Identify factors contributing to decreased intake, treat as appropriate  - Assist with meals as needed  - Monitor I&O, weight, and lab values if indicated  - Obtain nutrition services referral as needed  Outcome: Progressing     Problem: GENITOURINARY - ADULT  Goal: Maintains or returns to baseline urinary function  Description: INTERVENTIONS:  - Assess urinary function  - Encourage oral fluids to ensure adequate hydration if ordered  - Administer IV fluids as ordered to ensure adequate hydration  - Administer ordered medications as needed  - Offer frequent toileting  - Follow urinary retention protocol if ordered  Outcome: Progressing  Goal: Absence of urinary retention  Description: INTERVENTIONS:  - Assess patient’s ability to void and empty bladder  - Monitor I/O  - Bladder scan as needed  - Discuss with physician/AP medications to alleviate retention as needed  - Discuss catheterization for long term situations as appropriate  Outcome: Progressing  Goal: Urinary catheter remains patent  Description: INTERVENTIONS:  - Assess patency of urinary catheter  - If patient has a chronic puga, consider changing catheter if non-functioning  - Follow guidelines for intermittent irrigation of non-functioning urinary catheter  Outcome: Progressing     Problem: METABOLIC, FLUID AND ELECTROLYTES - ADULT  Goal: Electrolytes maintained within normal limits  Description: INTERVENTIONS:  - Monitor labs and assess patient for signs and symptoms of electrolyte imbalances  - Administer electrolyte replacement as ordered  - Monitor response to electrolyte replacements, including repeat lab results as appropriate  - Instruct patient on fluid and nutrition as appropriate  Outcome: Progressing  Goal: Fluid balance maintained  Description: INTERVENTIONS:  - Monitor labs   - Monitor I/O and WT  - Instruct patient on fluid and nutrition as appropriate  - Assess for signs & symptoms of volume excess or deficit  Outcome: Progressing     Problem: SKIN/TISSUE INTEGRITY - ADULT  Goal: Skin Integrity remains intact(Skin Breakdown Prevention)  Description: Assess:  -Perform Adria assessment every shift   -Clean and moisturize skin every void   -Inspect skin when repositioning, toileting, and assisting with ADLS  -Assess under medical devices such as port every shift   -Assess extremities for adequate circulation and sensation     Bed Management:  -Have minimal linens on bed & keep smooth, unwrinkled  -Change linens as needed when moist or perspiring  -Avoid sitting or lying in one position for more than 2 hours while in bed  -Keep HOB at 30degrees     Toileting:  -Offer bedside commode  -Assess for incontinence every shift   -Use incontinent care products after each incontinent episode such as moisture barriers     Activity:  -Mobilize patient 3 times a day  -Encourage activity and walks on unit  -Encourage or provide ROM exercises   -Turn and reposition patient every 2 Hours  -Use appropriate equipment to lift or move patient in bed  -Instruct/ Assist with weight shifting every 30 mins when out of bed in chair  -Consider limitation of chair time 2 hour intervals    Skin Care:  -Avoid use of baby powder, tape, friction and shearing, hot water or constrictive clothing  -Relieve pressure over bony prominences using allevyns   -Do not massage red bony areas    Next Steps:  -Teach patient strategies to minimize risks such as DTI's    -Consider consults to  interdisciplinary teams such as wound/PT/OT  Outcome: Progressing  Goal: Incision(s), wounds(s) or drain site(s) healing without S/S of infection  Description: INTERVENTIONS  - Assess and document dressing, incision, wound bed, drain sites and surrounding tissue  - Provide patient and family education  - Perform skin care/dressing changes every shift   Outcome: Progressing  Goal: Pressure injury heals and does not worsen  Description: Interventions:  - Implement low air loss mattress or specialty surface (Criteria met)  - Apply silicone foam dressing  - Instruct/assist with weight shifting every 30 minutes when in chair   - Limit chair time to 2 hour intervals  - Use special pressure reducing interventions such as waffle cushion  when in chair   - Apply fecal or urinary incontinence containment device   - Perform passive or active ROM every shift  - Turn and reposition patient & offload bony prominences every 2 hours   - Utilize friction reducing device or surface for transfers   - Consider consults to  interdisciplinary teams such as wound   - Use incontinent care products after each incontinent episode such as moisture barriers   - Consider nutrition services referral as needed  Outcome: Progressing     Problem: HEMATOLOGIC - ADULT  Goal: Maintains hematologic stability  Description: INTERVENTIONS  - Assess for signs and symptoms of bleeding or hemorrhage  - Monitor labs  - Administer supportive blood products/factors as ordered and appropriate  Outcome: Progressing     Problem: MUSCULOSKELETAL - ADULT  Goal: Maintain or return mobility to safest level of function  Description: INTERVENTIONS:  - Assess patient's ability to carry out ADLs; assess patient's baseline for ADL function and identify physical deficits which impact ability to perform ADLs (bathing, care of mouth/teeth, toileting, grooming, dressing, etc.)  - Assess/evaluate cause of self-care deficits   - Assess range of motion  - Assess patient's mobility  - Assess patient's need for assistive devices and provide as appropriate  - Encourage maximum independence but intervene and supervise when necessary  - Involve family in performance of ADLs  - Assess for home care needs following discharge   - Consider OT consult to assist with ADL evaluation and planning for discharge  - Provide patient education as appropriate  Outcome: Progressing

## 2023-08-15 NOTE — PLAN OF CARE
Problem: Potential for Falls  Goal: Patient will remain free of falls  Description: INTERVENTIONS:  - Educate patient/family on patient safety including physical limitations  - Instruct patient to call for assistance with activity   - Consult OT/PT to assist with strengthening/mobility   - Keep Call bell within reach  - Keep bed low and locked with side rails adjusted as appropriate  - Keep care items and personal belongings within reach  - Initiate and maintain comfort rounds  - Make Fall Risk Sign visible to staff  - Offer Toileting every 2 Hours, in advance of need  - Initiate/Maintain bed alarm  - Obtain necessary fall risk management equipment:   - Apply yellow socks and bracelet for high fall risk patients  - Consider moving patient to room near nurses station  Outcome: Progressing     Problem: MOBILITY - ADULT  Goal: Maintain or return to baseline ADL function  Description: INTERVENTIONS:  -  Assess patient's ability to carry out ADLs; assess patient's baseline for ADL function and identify physical deficits which impact ability to perform ADLs (bathing, care of mouth/teeth, toileting, grooming, dressing, etc.)  - Assess/evaluate cause of self-care deficits   - Assess range of motion  - Assess patient's mobility; develop plan if impaired  - Assess patient's need for assistive devices and provide as appropriate  - Encourage maximum independence but intervene and supervise when necessary  - Involve family in performance of ADLs  - Assess for home care needs following discharge   - Consider OT consult to assist with ADL evaluation and planning for discharge  - Provide patient education as appropriate  Outcome: Progressing  Goal: Maintains/Returns to pre admission functional level  Description: INTERVENTIONS:  - Perform BMAT or MOVE assessment daily.   - Set and communicate daily mobility goal to care team and patient/family/caregiver.    - Collaborate with rehabilitation services on mobility goals if consulted  - Record patient progress and toleration of activity level   Outcome: Progressing     Problem: Prexisting or High Potential for Compromised Skin Integrity  Goal: Skin integrity is maintained or improved  Description: INTERVENTIONS:  - Identify patients at risk for skin breakdown  - Assess and monitor skin integrity  - Assess and monitor nutrition and hydration status  - Monitor labs   - Assess for incontinence   - Turn and reposition patient  - Assist with mobility/ambulation  - Relieve pressure over bony prominences  - Avoid friction and shearing  - Provide appropriate hygiene as needed including keeping skin clean and dry  - Evaluate need for skin moisturizer/barrier cream  - Collaborate with interdisciplinary team   - Patient/family teaching  - Consider wound care consult   Outcome: Progressing     Problem: PAIN - ADULT  Goal: Verbalizes/displays adequate comfort level or baseline comfort level  Description: Interventions:  - Encourage patient to monitor pain and request assistance  - Assess pain using appropriate pain scale  - Administer analgesics based on type and severity of pain and evaluate response  - Implement non-pharmacological measures as appropriate and evaluate response  - Consider cultural and social influences on pain and pain management  - Notify physician/advanced practitioner if interventions unsuccessful or patient reports new pain  Outcome: Progressing     Problem: INFECTION - ADULT  Goal: Absence or prevention of progression during hospitalization  Description: INTERVENTIONS:  - Assess and monitor for signs and symptoms of infection  - Monitor lab/diagnostic results  - Monitor all insertion sites, i.e. indwelling lines, tubes, and drains  - Monitor endotracheal if appropriate and nasal secretions for changes in amount and color  - [place] appropriate cooling/warming therapies per order  - Administer medications as ordered  - Instruct and encourage patient and family to use good hand hygiene technique  - Identify and instruct in appropriate isolation precautions for identified infection/condition  Outcome: Progressing     Problem: SAFETY ADULT  Goal: Patient will remain free of falls  Description: INTERVENTIONS:  - Educate patient/family on patient safety including physical limitations  - Instruct patient to call for assistance with activity   - Consult OT/PT to assist with strengthening/mobility   - Keep Call bell within reach  - Keep bed low and locked with side rails adjusted as appropriate  - Keep care items and personal belongings within reach  - Initiate and maintain comfort rounds  - Make Fall Risk Sign visible to staff  - Offer Toileting every 2 Hours, in advance of need  - Initiate/Maintain bed alarm  - Obtain necessary fall risk management equipment:   - Apply yellow socks and bracelet for high fall risk patients  - Consider moving patient to room near nurses station  Outcome: Progressing  Goal: Maintain or return to baseline ADL function  Description: INTERVENTIONS:  -  Assess patient's ability to carry out ADLs; assess patient's baseline for ADL function and identify physical deficits which impact ability to perform ADLs (bathing, care of mouth/teeth, toileting, grooming, dressing, etc.)  - Assess/evaluate cause of self-care deficits   - Assess range of motion  - Assess patient's mobility; develop plan if impaired  - Assess patient's need for assistive devices and provide as appropriate  - Encourage maximum independence but intervene and supervise when necessary  - Involve family in performance of ADLs  - Assess for home care needs following discharge   - Consider OT consult to assist with ADL evaluation and planning for discharge  - Provide patient education as appropriate  Outcome: Progressing  Goal: Maintains/Returns to pre admission functional level  Description: INTERVENTIONS:  - Perform BMAT or MOVE assessment daily.   - Set and communicate daily mobility goal to care team and patient/family/caregiver. - Collaborate with rehabilitation services on mobility goals if consulted  - Stand patient 2 times a day    - Out of bed for toileting  - Record patient progress and toleration of activity level   Outcome: Progressing     Problem: DISCHARGE PLANNING  Goal: Discharge to home or other facility with appropriate resources  Description: INTERVENTIONS:  - Identify barriers to discharge w/patient and caregiver  - Arrange for needed discharge resources and transportation as appropriate  - Identify discharge learning needs (meds, wound care, etc.)  - Arrange for interpretive services to assist at discharge as needed  - Refer to Case Management Department for coordinating discharge planning if the patient needs post-hospital services based on physician/advanced practitioner order or complex needs related to functional status, cognitive ability, or social support system  Outcome: Progressing     Problem: Knowledge Deficit  Goal: Patient/family/caregiver demonstrates understanding of disease process, treatment plan, medications, and discharge instructions  Description: Complete learning assessment and assess knowledge base. Interventions:  - Provide teaching at level of understanding  - Provide teaching via preferred learning methods  Outcome: Progressing     Problem: Nutrition/Hydration-ADULT  Goal: Nutrient/Hydration intake appropriate for improving, restoring or maintaining nutritional needs  Description: Monitor and assess patient's nutrition/hydration status for malnutrition. Collaborate with interdisciplinary team and initiate plan and interventions as ordered. Monitor patient's weight and dietary intake as ordered or per policy. Utilize nutrition screening tool and intervene as necessary. Determine patient's food preferences and provide high-protein, high-caloric foods as appropriate.      INTERVENTIONS:  - Monitor oral intake, urinary output, labs, and treatment plans  - Assess nutrition and hydration status and recommend course of action  - Evaluate amount of meals eaten  - Assist patient with eating if necessary   - Allow adequate time for meals  - Recommend/ encourage appropriate diets, oral nutritional supplements, and vitamin/mineral supplements  - Order, calculate, and assess calorie counts as needed  - Recommend, monitor, and adjust tube feedings and TPN/PPN based on assessed needs  - Assess need for intravenous fluids  - Provide specific nutrition/hydration education as appropriate  - Include patient/family/caregiver in decisions related to nutrition  Outcome: Progressing     Problem: COPING  Goal: Pt/Family able to verbalize concerns and demonstrate effective coping strategies  Description: INTERVENTIONS:  - Assist patient/family to identify coping skills, available support systems and cultural and spiritual values  - Provide emotional support, including active listening and acknowledgement of concerns of patient and caregivers  - Reduce environmental stimuli, as able  - Provide patient education  - Assess for spiritual pain/suffering and initiate spiritual care, including notification of Pastoral Care or melody based community as needed  - Assess effectiveness of coping strategies  Outcome: Progressing  Goal: Will report anxiety at manageable levels  Description: INTERVENTIONS:  - Administer medication as ordered  - Teach and encourage coping skills  - Provide emotional support  - Assess patient/family for anxiety and ability to cope  Outcome: Progressing     Problem: NEUROSENSORY - ADULT  Goal: Achieves stable or improved neurological status  Description: INTERVENTIONS  - Monitor and report changes in neurological status  - Monitor vital signs such as temperature, blood pressure, glucose, and any other labs ordered   - Initiate measures to prevent increased intracranial pressure  - Monitor for seizure activity and implement precautions if appropriate      Outcome: Progressing  Goal: Achieves maximal functionality and self care  Description: INTERVENTIONS  - Monitor swallowing and airway patency with patient fatigue and changes in neurological status  - Encourage and assist patient to increase activity and self care.    - Encourage visually impaired, hearing impaired and aphasic patients to use assistive/communication devices  Outcome: Progressing     Problem: CARDIOVASCULAR - ADULT  Goal: Maintains optimal cardiac output and hemodynamic stability  Description: INTERVENTIONS:  - Monitor I/O, vital signs and rhythm  - Monitor for S/S and trends of decreased cardiac output  - Administer and titrate ordered vasoactive medications to optimize hemodynamic stability  - Assess quality of pulses, skin color and temperature  - Assess for signs of decreased coronary artery perfusion  - Instruct patient to report change in severity of symptoms  Outcome: Progressing  Goal: Absence of cardiac dysrhythmias or at baseline rhythm  Description: INTERVENTIONS:  - Continuous cardiac monitoring, vital signs, obtain 12 lead EKG if ordered  - Administer antiarrhythmic and heart rate control medications as ordered  - Monitor electrolytes and administer replacement therapy as ordered  Outcome: Progressing     Problem: RESPIRATORY - ADULT  Goal: Achieves optimal ventilation and oxygenation  Description: INTERVENTIONS:  - Assess for changes in respiratory status  - Assess for changes in mentation and behavior  - Position to facilitate oxygenation and minimize respiratory effort  - Oxygen administered by appropriate delivery if ordered  - Initiate smoking cessation education as indicated  - Encourage broncho-pulmonary hygiene including cough, deep breathe, Incentive Spirometry  - Assess the need for suctioning and aspirate as needed  - Assess and instruct to report SOB or any respiratory difficulty  - Respiratory Therapy support as indicated  Outcome: Progressing     Problem: GASTROINTESTINAL - ADULT  Goal: Maintains or returns to baseline bowel function  Description: INTERVENTIONS:  - Assess bowel function  - Encourage oral fluids to ensure adequate hydration  - Administer IV fluids if ordered to ensure adequate hydration  - Administer ordered medications as needed  - Encourage mobilization and activity  - Consider nutritional services referral to assist patient with adequate nutrition and appropriate food choices  Outcome: Progressing  Goal: Maintains adequate nutritional intake  Description: INTERVENTIONS:  - Monitor percentage of each meal consumed  - Identify factors contributing to decreased intake, treat as appropriate  - Assist with meals as needed  - Monitor I&O, weight, and lab values if indicated  - Obtain nutrition services referral as needed  Outcome: Progressing     Problem: GENITOURINARY - ADULT  Goal: Maintains or returns to baseline urinary function  Description: INTERVENTIONS:  - Assess urinary function  - Encourage oral fluids to ensure adequate hydration if ordered  - Administer IV fluids as ordered to ensure adequate hydration  - Administer ordered medications as needed  - Offer frequent toileting  - Follow urinary retention protocol if ordered  Outcome: Progressing  Goal: Absence of urinary retention  Description: INTERVENTIONS:  - Assess patient’s ability to void and empty bladder  - Monitor I/O  - Bladder scan as needed  - Discuss with physician/AP medications to alleviate retention as needed  - Discuss catheterization for long term situations as appropriate  Outcome: Progressing  Goal: Urinary catheter remains patent  Description: INTERVENTIONS:  - Assess patency of urinary catheter  - If patient has a chronic puga, consider changing catheter if non-functioning  - Follow guidelines for intermittent irrigation of non-functioning urinary catheter  Outcome: Progressing     Problem: METABOLIC, FLUID AND ELECTROLYTES - ADULT  Goal: Electrolytes maintained within normal limits  Description: INTERVENTIONS:  - Monitor labs and assess patient for signs and symptoms of electrolyte imbalances  - Administer electrolyte replacement as ordered  - Monitor response to electrolyte replacements, including repeat lab results as appropriate  - Instruct patient on fluid and nutrition as appropriate  Outcome: Progressing  Goal: Fluid balance maintained  Description: INTERVENTIONS:  - Monitor labs   - Monitor I/O and WT  - Instruct patient on fluid and nutrition as appropriate  - Assess for signs & symptoms of volume excess or deficit  Outcome: Progressing     Problem: SKIN/TISSUE INTEGRITY - ADULT  Goal: Skin Integrity remains intact(Skin Breakdown Prevention)    -Inspect skin when repositioning, toileting, and assisting with ADLS  -Encourage activity and walks on unit    Outcome: Progressing  Goal: Incision(s), wounds(s) or drain site(s) healing without S/S of infection  Description: INTERVENTIONS  - Assess and document dressing, incision, wound bed, drain sites and surrounding tissue  - Provide patient and family educati  Goal: Pressure injury heals and does not worsen  Description: Interventions:  - Implement low air loss mattress or specialty surface (Criteria met)  - Apply silicone foam dressing  - Consider nutrition services referral as needed  Outcome: Progressing     Problem: HEMATOLOGIC - ADULT  Goal: Maintains hematologic stability  Description: INTERVENTIONS  - Assess for signs and symptoms of bleeding or hemorrhage  - Monitor labs  - Administer supportive blood products/factors as ordered and appropriate  Outcome: Progressing     Problem: MUSCULOSKELETAL - ADULT  Goal: Maintain or return mobility to safest level of function  Description: INTERVENTIONS:  - Assess patient's ability to carry out ADLs; assess patient's baseline for ADL function and identify physical deficits which impact ability to perform ADLs (bathing, care of mouth/teeth, toileting, grooming, dressing, etc.)  - Assess/evaluate cause of self-care deficits   - Assess range of motion  - Assess patient's mobility  - Assess patient's need for assistive devices and provide as appropriate  - Encourage maximum independence but intervene and supervise when necessary  - Involve family in performance of ADLs  - Assess for home care needs following discharge   - Consider OT consult to assist with ADL evaluation and planning for discharge  - Provide patient education as appropriate  Outcome: Progressing

## 2023-08-16 ENCOUNTER — APPOINTMENT (INPATIENT)
Dept: RADIOLOGY | Facility: HOSPITAL | Age: 38
DRG: 421 | End: 2023-08-16
Attending: INTERNAL MEDICINE
Payer: COMMERCIAL

## 2023-08-16 PROBLEM — F32.A DEPRESSION: Chronic | Status: ACTIVE | Noted: 2023-08-16

## 2023-08-16 LAB — B BURGDOR DNA SPEC QL NAA+PROBE: NEGATIVE

## 2023-08-16 PROCEDURE — 99232 SBSQ HOSP IP/OBS MODERATE 35: CPT | Performed by: PSYCHIATRY & NEUROLOGY

## 2023-08-16 PROCEDURE — 74230 X-RAY XM SWLNG FUNCJ C+: CPT

## 2023-08-16 PROCEDURE — 97116 GAIT TRAINING THERAPY: CPT

## 2023-08-16 PROCEDURE — 97530 THERAPEUTIC ACTIVITIES: CPT

## 2023-08-16 PROCEDURE — 99232 SBSQ HOSP IP/OBS MODERATE 35: CPT | Performed by: INTERNAL MEDICINE

## 2023-08-16 PROCEDURE — 92611 MOTION FLUOROSCOPY/SWALLOW: CPT

## 2023-08-16 RX ORDER — FAMOTIDINE 20 MG/1
20 TABLET, FILM COATED ORAL EVERY 12 HOURS SCHEDULED
Status: DISCONTINUED | OUTPATIENT
Start: 2023-08-16 | End: 2023-08-18 | Stop reason: HOSPADM

## 2023-08-16 RX ORDER — FUROSEMIDE 10 MG/ML
20 INJECTION INTRAMUSCULAR; INTRAVENOUS
Status: COMPLETED | OUTPATIENT
Start: 2023-08-16 | End: 2023-08-16

## 2023-08-16 RX ORDER — TORSEMIDE 20 MG/1
20 TABLET ORAL DAILY
Status: DISCONTINUED | OUTPATIENT
Start: 2023-08-17 | End: 2023-08-18 | Stop reason: HOSPADM

## 2023-08-16 RX ADMIN — THIAMINE HCL TAB 100 MG 500 MG: 100 TAB at 14:57

## 2023-08-16 RX ADMIN — THIAMINE HCL TAB 100 MG 500 MG: 100 TAB at 21:37

## 2023-08-16 RX ADMIN — POLYETHYLENE GLYCOL 3350 17 G: 17 POWDER, FOR SOLUTION ORAL at 08:16

## 2023-08-16 RX ADMIN — ENOXAPARIN SODIUM 40 MG: 40 INJECTION SUBCUTANEOUS at 21:37

## 2023-08-16 RX ADMIN — ENOXAPARIN SODIUM 40 MG: 40 INJECTION SUBCUTANEOUS at 08:16

## 2023-08-16 RX ADMIN — SENNOSIDES 17.2 MG: 8.6 TABLET, FILM COATED ORAL at 08:16

## 2023-08-16 RX ADMIN — FAMOTIDINE 20 MG: 20 TABLET, FILM COATED ORAL at 21:37

## 2023-08-16 RX ADMIN — FLUOXETINE 10 MG: 10 CAPSULE ORAL at 08:16

## 2023-08-16 RX ADMIN — FUROSEMIDE 20 MG: 10 INJECTION, SOLUTION INTRAMUSCULAR; INTRAVENOUS at 16:59

## 2023-08-16 RX ADMIN — FAMOTIDINE 20 MG: 20 TABLET, FILM COATED ORAL at 08:16

## 2023-08-16 RX ADMIN — SENNOSIDES 17.2 MG: 8.6 TABLET, FILM COATED ORAL at 16:59

## 2023-08-16 RX ADMIN — FUROSEMIDE 20 MG: 10 INJECTION, SOLUTION INTRAMUSCULAR; INTRAVENOUS at 08:16

## 2023-08-16 NOTE — CASE MANAGEMENT
Case Management Discharge Planning Note    Patient name Gladis Edwards  [facility] MRN [id number]  : 1985 Date 2023       Current Admission Date: 2023  Current Admission Diagnosis:Encephalopathy   Patient Active Problem List    Diagnosis Date Noted   • Encephalopathy 2023   • Acute respiratory failure ([address]) 2023   • Catatonic reaction    • Somnolence 2023   • Constipation 2023   • Wheezing 2023   • Testicular cancer ([address]) 2023   • Obesity hypoventilation syndrome ([address]) 2023   • Fall 2023   • Hyponatremia 2023   • Hypokalemia 2023   • Bipolar I disorder ([address]) 2023   • (HFpEF) heart failure with preserved ejection fraction ([address]) 2023   • Chronic diastolic heart failure ([address]) 2023   • Palpitations 2023   • LVH (left ventricular hypertrophy) 2023   • Pure hypertriglyceridemia 2023   • Cerebral gliosis 2023   • Unilateral vestibular schwannoma ([address]) 2023   • Port-A-Cath in place 2023   • Diplopia 2023   • 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   • Urinary hesitancy    • Umbilical hernia without obstruction and without gangrene 12/10/2022   • Tobacco use 12/10/2022   • Retroperitoneal lymphadenopathy 12/10/2022   • Hypertension 12/10/2022      LOS (days): 5  Geometric Mean LOS (GMLOS) (days):   Days to GMLOS:     OBJECTIVE:  Risk of Unplanned Readmission Score: 30.09         Current admission status: Inpatient   Preferred Pharmacy:   [address], [address]  Phone: [phone] Fax: [phone]    Primary Care Provider: Mechelle Badillo MD    Primary Insurance: [facility]  Secondary Insurance:     DISCHARGE DETAILS:    Discharge planning discussed with[de-identified] Mother Latricia Garber [place] of Choice: Yes  Comments - Freedom of Choice: FOC discussed for BIN  CM contacted family/caregiver?: Yes (Mother Rachel Shannon at bedside)  Were Treatment Team discharge recommendations reviewed with patient/caregiver?: Yes (STR)  Did patient/caregiver verbalize understanding of patient care needs?: Yes  Were patient/caregiver advised of the risks associated with not following Treatment Team discharge recommendations?: Yes    Contacts  Patient Contacts: Rachel Shannon  Relationship to Patient[de-identified] Family  Contact Method: Phone, In Person  Phone Number: [phone] and at bedside  Reason/Outcome: Discharge Planning (mother Rachel Shannon refuses STR, will take home)    [address]         Is the patient interested in [address] at discharge?: Yes  [facility] requested[de-identified] Nursing, Occupational Therapy, Physical [address] Name[de-identified] [facility] Provider[de-identified] PCP  Home Health Services Needed[de-identified] Evaluate Functional Status and Safety, Strengthening/Theraputic Exercises to Improve Function, Wound/Ostomy Care (pressure ulcer wound care and education)  Homebound Criteria Met[de-identified] Requires the Assistance of Another Person for Safe Ambulation or to Leave the Home, Uses an Assist Device (i.e. cane, walker, etc)  Supporting Clincal Findings[de-identified] Limited Endurance, Fatigues Easliy in [employer]    DME Referral Provided  Referral made for DME?: Yes  DME referral completed for the following items[de-identified] Bedside Commode, Hospital Bed, [place] [employer]  DME Supplier Name[de-identified] [employer]    Other Referral/Resources/Interventions Provided:  Interventions: DME  Referral Comments: Met with Mother at pt bedside and [facility] provider, Dr Zoey Taylor. Mother Rachel Shannon states pt will go to pt's Aunt's home [facility] where he will receive 24 hour care as family are both CNA's and Med techs. She is requesting Hospital bed, bedside commode as bathroom is only on 2nd floor and RW. Order placed on [place]. [facility] accepted for SN/PT/OT services and reserved as provider.  Per mother made her aware DME has to be verified with insurance and delivered to home once verified and [facility] will be called to arrange for delivery if approved by insurance. CM will f/u with her tomorrow. pt medically stable for dc, awaiting DME approval.notified [facility] on [facility] of pt not coming to them for STR. And per Mother [place] family will provide transport to home.          Treatment Team Recommendation: Short Term Rehab (family refuses STR)  Discharge Destination Plan[de-identified] Home with [address] [facility])

## 2023-08-16 NOTE — PLAN OF CARE
Problem: Potential for Falls  Goal: Patient will remain free of falls  Description: INTERVENTIONS:  - Educate patient/family on patient safety including physical limitations  - Instruct patient to call for assistance with activity   - Consult OT/PT to assist with strengthening/mobility   - Keep Call bell within reach  - Keep bed low and locked with side rails adjusted as appropriate  - Keep care items and personal belongings within reach  - Initiate and maintain comfort rounds  - Make Fall Risk Sign visible to staff  - Offer Toileting every 2 Hours, in advance of need  - Initiate/Maintain bed alarm  - Obtain necessary fall risk management equipment: in too   - Apply yellow socks and bracelet for high fall risk patients  - Consider moving patient to room near nurses station  Outcome: Progressing     Problem: MOBILITY - ADULT  Goal: Maintain or return to baseline ADL function  Description: INTERVENTIONS:  -  Assess patient's ability to carry out ADLs; assess patient's baseline for ADL function and identify physical deficits which impact ability to perform ADLs (bathing, care of mouth/teeth, toileting, grooming, dressing, etc.)  - Assess/evaluate cause of self-care deficits   - Assess range of motion  - Assess patient's mobility; develop plan if impaired  - Assess patient's need for assistive devices and provide as appropriate  - Encourage maximum independence but intervene and supervise when necessary  - Involve family in performance of ADLs  - Assess for home care needs following discharge   - Consider OT consult to assist with ADL evaluation and planning for discharge  - Provide patient education as appropriate  Outcome: Progressing  Goal: Maintains/Returns to pre admission functional level  Description: INTERVENTIONS:  - Perform BMAT or MOVE assessment daily.   - Set and communicate daily mobility goal to care team and patient/family/caregiver.    - Collaborate with rehabilitation services on mobility goals if consulted  - Perform Range of Motion 3 times a day. - Reposition patient every 2 hours.   - Dangle patient 3 times a day  - Stand patient 3 times a day  - Ambulate patient 3 times a day  - Out of bed to chair 3 times a day   - Out of bed for meals 3 times a day  - Out of bed for toileting  - Record patient progress and toleration of activity level   Outcome: Progressing     Problem: Prexisting or High Potential for Compromised Skin Integrity  Goal: Skin integrity is maintained or improved  Description: INTERVENTIONS:  - Identify patients at risk for skin breakdown  - Assess and monitor skin integrity  - Assess and monitor nutrition and hydration status  - Monitor labs   - Assess for incontinence   - Turn and reposition patient  - Assist with mobility/ambulation  - Relieve pressure over bony prominences  - Avoid friction and shearing  - Provide appropriate hygiene as needed including keeping skin clean and dry  - Evaluate need for skin moisturizer/barrier cream  - Collaborate with interdisciplinary team   - Patient/family teaching  - Consider wound care consult   Outcome: Progressing     Problem: PAIN - ADULT  Goal: Verbalizes/displays adequate comfort level or baseline comfort level  Description: Interventions:  - Encourage patient to monitor pain and request assistance  - Assess pain using appropriate pain scale  - Administer analgesics based on type and severity of pain and evaluate response  - Implement non-pharmacological measures as appropriate and evaluate response  - Consider cultural and social influences on pain and pain management  - Notify physician/advanced practitioner if interventions unsuccessful or patient reports new pain  Outcome: Progressing     Problem: INFECTION - ADULT  Goal: Absence or prevention of progression during hospitalization  Description: INTERVENTIONS:  - Assess and monitor for signs and symptoms of infection  - Monitor lab/diagnostic results  - Monitor all insertion sites, i.e. indwelling lines, tubes, and drains  - Monitor endotracheal if appropriate and nasal secretions for changes in amount and color  - [place] appropriate cooling/warming therapies per order  - Administer medications as ordered  - Instruct and encourage patient and family to use good hand hygiene technique  - Identify and instruct in appropriate isolation precautions for identified infection/condition  Outcome: Progressing     Problem: SAFETY ADULT  Goal: Patient will remain free of falls  Description: INTERVENTIONS:  - Educate patient/family on patient safety including physical limitations  - Instruct patient to call for assistance with activity   - Consult OT/PT to assist with strengthening/mobility   - Keep Call bell within reach  - Keep bed low and locked with side rails adjusted as appropriate  - Keep care items and personal belongings within reach  - Initiate and maintain comfort rounds  - Make Fall Risk Sign visible to staff  - Offer Toileting every 2 Hours, in advance of need  - Initiate/Maintain bed alarm  - Obtain necessary fall risk management equipment: in room   - Apply yellow socks and bracelet for high fall risk patients  - Consider moving patient to room near nurses station  Outcome: Progressing  Goal: Maintain or return to baseline ADL function  Description: INTERVENTIONS:  -  Assess patient's ability to carry out ADLs; assess patient's baseline for ADL function and identify physical deficits which impact ability to perform ADLs (bathing, care of mouth/teeth, toileting, grooming, dressing, etc.)  - Assess/evaluate cause of self-care deficits   - Assess range of motion  - Assess patient's mobility; develop plan if impaired  - Assess patient's need for assistive devices and provide as appropriate  - Encourage maximum independence but intervene and supervise when necessary  - Involve family in performance of ADLs  - Assess for home care needs following discharge   - Consider OT consult to assist with ADL evaluation and planning for discharge  - Provide patient education as appropriate  Outcome: Progressing     Problem: DISCHARGE PLANNING  Goal: Discharge to home or other facility with appropriate resources  Description: INTERVENTIONS:  - Identify barriers to discharge w/patient and caregiver  - Arrange for needed discharge resources and transportation as appropriate  - Identify discharge learning needs (meds, wound care, etc.)  - Arrange for interpretive services to assist at discharge as needed  - Refer to Case Management Department for coordinating discharge planning if the patient needs post-hospital services based on physician/advanced practitioner order or complex needs related to functional status, cognitive ability, or social support system  Outcome: Progressing     Problem: Knowledge Deficit  Goal: Patient/family/caregiver demonstrates understanding of disease process, treatment plan, medications, and discharge instructions  Description: Complete learning assessment and assess knowledge base. Interventions:  - Provide teaching at level of understanding  - Provide teaching via preferred learning methods  Outcome: Progressing     Problem: Nutrition/Hydration-ADULT  Goal: Nutrient/Hydration intake appropriate for improving, restoring or maintaining nutritional needs  Description: Monitor and assess patient's nutrition/hydration status for malnutrition. Collaborate with interdisciplinary team and initiate plan and interventions as ordered. Monitor patient's weight and dietary intake as ordered or per policy. Utilize nutrition screening tool and intervene as necessary. Determine patient's food preferences and provide high-protein, high-caloric foods as appropriate.      INTERVENTIONS:  - Monitor oral intake, urinary output, labs, and treatment plans  - Assess nutrition and hydration status and recommend course of action  - Evaluate amount of meals eaten  - Assist patient with eating if necessary   - Allow adequate time for meals  - Recommend/ encourage appropriate diets, oral nutritional supplements, and vitamin/mineral supplements  - Order, calculate, and assess calorie counts as needed  - Recommend, monitor, and adjust tube feedings and TPN/PPN based on assessed needs  - Assess need for intravenous fluids  - Provide specific nutrition/hydration education as appropriate  - Include patient/family/caregiver in decisions related to nutrition  Outcome: Progressing     Problem: COPING  Goal: Pt/Family able to verbalize concerns and demonstrate effective coping strategies  Description: INTERVENTIONS:  - Assist patient/family to identify coping skills, available support systems and cultural and spiritual values  - Provide emotional support, including active listening and acknowledgement of concerns of patient and caregivers  - Reduce environmental stimuli, as able  - Provide patient education  - Assess for spiritual pain/suffering and initiate spiritual care, including notification of Pastoral Care or melody based community as needed  - Assess effectiveness of coping strategies  Outcome: Progressing  Goal: Will report anxiety at manageable levels  Description: INTERVENTIONS:  - Administer medication as ordered  - Teach and encourage coping skills  - Provide emotional support  - Assess patient/family for anxiety and ability to cope  Outcome: Progressing     Problem: NEUROSENSORY - ADULT  Goal: Achieves stable or improved neurological status  Description: INTERVENTIONS  - Monitor and report changes in neurological status  - Monitor vital signs such as temperature, blood pressure, glucose, and any other labs ordered   - Initiate measures to prevent increased intracranial pressure  - Monitor for seizure activity and implement precautions if appropriate      Outcome: Progressing  Goal: Achieves maximal functionality and self care  Description: INTERVENTIONS  - Monitor swallowing and airway patency with patient fatigue and changes in neurological status  - Encourage and assist patient to increase activity and self care.    - Encourage visually impaired, hearing impaired and aphasic patients to use assistive/communication devices  Outcome: Progressing     Problem: CARDIOVASCULAR - ADULT  Goal: Maintains optimal cardiac output and hemodynamic stability  Description: INTERVENTIONS:  - Monitor I/O, vital signs and rhythm  - Monitor for S/S and trends of decreased cardiac output  - Administer and titrate ordered vasoactive medications to optimize hemodynamic stability  - Assess quality of pulses, skin color and temperature  - Assess for signs of decreased coronary artery perfusion  - Instruct patient to report change in severity of symptoms  Outcome: Progressing  Goal: Absence of cardiac dysrhythmias or at baseline rhythm  Description: INTERVENTIONS:  - Continuous cardiac monitoring, vital signs, obtain 12 lead EKG if ordered  - Administer antiarrhythmic and heart rate control medications as ordered  - Monitor electrolytes and administer replacement therapy as ordered  Outcome: Progressing     Problem: RESPIRATORY - ADULT  Goal: Achieves optimal ventilation and oxygenation  Description: INTERVENTIONS:  - Assess for changes in respiratory status  - Assess for changes in mentation and behavior  - Position to facilitate oxygenation and minimize respiratory effort  - Oxygen administered by appropriate delivery if ordered  - Initiate smoking cessation education as indicated  - Encourage broncho-pulmonary hygiene including cough, deep breathe, Incentive Spirometry  - Assess the need for suctioning and aspirate as needed  - Assess and instruct to report SOB or any respiratory difficulty  - Respiratory Therapy support as indicated  Outcome: Progressing     Problem: GASTROINTESTINAL - ADULT  Goal: Maintains or returns to baseline bowel function  Description: INTERVENTIONS:  - Assess bowel function  - Encourage oral fluids to ensure adequate hydration  - Administer IV fluids if ordered to ensure adequate hydration  - Administer ordered medications as needed  - Encourage mobilization and activity  - Consider nutritional services referral to assist patient with adequate nutrition and appropriate food choices  Outcome: Progressing  Goal: Maintains adequate nutritional intake  Description: INTERVENTIONS:  - Monitor percentage of each meal consumed  - Identify factors contributing to decreased intake, treat as appropriate  - Assist with meals as needed  - Monitor I&O, weight, and lab values if indicated  - Obtain nutrition services referral as needed  Outcome: Progressing     Problem: GENITOURINARY - ADULT  Goal: Maintains or returns to baseline urinary function  Description: INTERVENTIONS:  - Assess urinary function  - Encourage oral fluids to ensure adequate hydration if ordered  - Administer IV fluids as ordered to ensure adequate hydration  - Administer ordered medications as needed  - Offer frequent toileting  - Follow urinary retention protocol if ordered  Outcome: Progressing  Goal: Absence of urinary retention  Description: INTERVENTIONS:  - Assess patient’s ability to void and empty bladder  - Monitor I/O  - Bladder scan as needed  - Discuss with physician/AP medications to alleviate retention as needed  - Discuss catheterization for long term situations as appropriate  Outcome: Progressing  Goal: Urinary catheter remains patent  Description: INTERVENTIONS:  - Assess patency of urinary catheter  - If patient has a chronic puga, consider changing catheter if non-functioning  - Follow guidelines for intermittent irrigation of non-functioning urinary catheter  Outcome: Progressing     Problem: METABOLIC, FLUID AND ELECTROLYTES - ADULT  Goal: Electrolytes maintained within normal limits  Description: INTERVENTIONS:  - Monitor labs and assess patient for signs and symptoms of electrolyte imbalances  - Administer electrolyte replacement as ordered  - Monitor response to electrolyte replacements, including repeat lab results as appropriate  - Instruct patient on fluid and nutrition as appropriate  Outcome: Progressing  Goal: Fluid balance maintained  Description: INTERVENTIONS:  - Monitor labs   - Monitor I/O and WT  - Instruct patient on fluid and nutrition as appropriate  - Assess for signs & symptoms of volume excess or deficit  Outcome: Progressing     Problem: SKIN/TISSUE INTEGRITY - ADULT  Goal: Skin Integrity remains intact(Skin Breakdown Prevention)  Description: Assess:  -Perform Adria assessment every shift   -Clean and moisturize skin every void   -Inspect skin when repositioning, toileting, and assisting with ADLS  -Assess under medical devices such as port every shift   -Assess extremities for adequate circulation and sensation     Bed Management:  -Have minimal linens on bed & keep smooth, unwrinkled  -Change linens as needed when moist or perspiring  -Avoid sitting or lying in one position for more than 2 hours while in bed  -Keep HOB at 30degrees     Toileting:  -Offer bedside commode  -Assess for incontinence every shift   -Use incontinent care products after each incontinent episode such as barrier moisturizers     Skin Care:  -Avoid use of baby powder, tape, friction and shearing, hot water or constrictive clothing  -Relieve pressure over bony prominences using allevyns    -Do not massage red bony areas    Next Steps:  -Teach patient strategies to minimize risks such as DTI's  -Consider consults to  interdisciplinary teams such as wound   Outcome: Progressing  Goal: Incision(s), wounds(s) or drain site(s) healing without S/S of infection  Description: INTERVENTIONS  - Assess and document dressing, incision, wound bed, drain sites and surrounding tissue  - Provide patient and family education  - Perform skin care/dressing changes every shift   Outcome: Progressing  Goal: Pressure injury heals and does not worsen  Description: Interventions:  - Implement low air loss mattress or specialty surface (Criteria met)  - Apply silicone foam dressing  - Instruct/assist with weight shifting every 30 minutes when in chair   - Limit chair time to 2 hour intervals  - Use special pressure reducing interventions such as waffle cushion when in chair   - Apply fecal or urinary incontinence containment device   - Perform passive or active ROM every shift   - Turn and reposition patient & offload bony prominences every 2 hours   - Utilize friction reducing device or surface for transfers   - Consider consults to  interdisciplinary teams such as PT/OT  - Use incontinent care products after each incontinent episode such as moisture barriers   - Consider nutrition services referral as needed  Outcome: Progressing     Problem: HEMATOLOGIC - ADULT  Goal: Maintains hematologic stability  Description: INTERVENTIONS  - Assess for signs and symptoms of bleeding or hemorrhage  - Monitor labs  - Administer supportive blood products/factors as ordered and appropriate  Outcome: Progressing     Problem: MUSCULOSKELETAL - ADULT  Goal: Maintain or return mobility to safest level of function  Description: INTERVENTIONS:  - Assess patient's ability to carry out ADLs; assess patient's baseline for ADL function and identify physical deficits which impact ability to perform ADLs (bathing, care of mouth/teeth, toileting, grooming, dressing, etc.)  - Assess/evaluate cause of self-care deficits   - Assess range of motion  - Assess patient's mobility  - Assess patient's need for assistive devices and provide as appropriate  - Encourage maximum independence but intervene and supervise when necessary  - Involve family in performance of ADLs  - Assess for home care needs following discharge   - Consider OT consult to assist with ADL evaluation and planning for discharge  - Provide patient education as appropriate  Outcome: Progressing

## 2023-08-16 NOTE — PROCEDURES
Speech Pathology - Modified Barium Swallow Study    Patient Name: Gayle Watson    [id number] Date: 8/16/2023     Problem List  Principal Problem:    Encephalopathy  Active Problems:    Hypertension    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HCC)    Cerebral gliosis    Unilateral vestibular schwannoma (HCC)    Chronic diastolic heart failure (HCC)    Fall    Hyponatremia    Bipolar I disorder (HCC)    Obesity hypoventilation syndrome (HCC)    Somnolence    Acute respiratory failure (HCC)    Catatonic reaction    Past Medical History  Past Medical History:   Diagnosis Date   • Anxiety    • Cancer ([address])    • Depression    • Psychiatric disorder     bipolar     Past Surgical History  Past Surgical History:   Procedure Laterality Date   • IR BIOPSY LYMPH NODE  1/20/2023   • IR PORT PLACEMENT  3/14/2023   • ORCHIECTOMY Left 12/14/2022    Procedure: ORCHIECTOMY INGUINAL;  Surgeon: Rukhsana Livingston MD;  Location: [facility];  Service: Urology       Assessment Summary:    Pt presents with mild-moderate oral and min pharyngeal dysphagia characterized by reduced bolus control and transfer, premature spillage, and mildly delayed swallow initiation. Overall pt had good airway protection and no penetration or aspiration across all consistencies. Note: Images are available for review in PACS as desired. Recommendations:   Recommended Diet: mechanically altered/level 2 diet and thin liquids   Recommended Form of Medications: whole with liquid   Aspiration precautions and compensatory swallowing strategies: upright posture, slow rate of feeding, small bites/sips and alternating bites and sips  SLP Dysphagia therapy recommended: yes    Results Reviewed with: patient and MD       General Information;  Pt is a 41 y.o. male with a PMH remarkable for encephalopathy, cerebral gliosis, hx of testicular CA, and dysphagia. Current concerns for dysphagia include inconsistent s/s aspiration at bedside and limited pt feedback.  MBS was recommended to assess oropharyngeal stage swallowing skills at this time. Pt was viewed sitting upright in the lateral and AP positions. Trials administered were consistent with MBSImP Validated Protocol: Pt was given 5-mL thin liquid x2, 20-mL cup sip thin, 40-mL sequential swallow thin, 5-mL nectar thick, 20-mL cup sip nectar thick, 40-mL sequential swallow nectar thick, 5-mL honey thick, 5-mL pudding, ½ cookie coated with 3-mL pudding, 5-mL nectar thick in the AP position and 5-mL pudding in the AP position. Pt was also given thin liquids by straw, as well as a barium tablet with thin liquid. Initial view observations/comments: Clear view of the upper airway      8-Point Penetration-Aspiration Scale   Thin liquid 1 - Material does not enter the airway   Nectar thick liquid 1 - Material does not enter the airway   Honey thick liquid 1 - Material does not enter the airway   Puree (pudding) 1 - Material does not enter the airway   Solid 1 - Material does not enter the airway     Aspiration Response and Efficacy: No aspiration occurred on this test.    MBS IMP Rating    ORAL Impairment  Compinent 1--Lip Closure  Judged at any point during the swallow. 1 - Interlabial escape; no progression to anterior lip    Component 2--Tongue Control During Bolus Hold  Judged on held liquid boluses only and prior to productive tongue movement. 2 - Posterior escape of less than half of bolus    Component 3--Bolus Preparation/Mastication  Judged only during presentation of 1/2 shortbread cookie coated in pudding. 1 - Slow prolonged chewing/mashing with complete re-collection    Component 4--Bolus Transport/Lingual Motion  Judged after first productive tongue movement for oral bolus transport. 0 - Brisk tongue motion    Component 5--Oral Residue  Judged after first swallow or after the last swallow of the sequential swallow task.   3 - Majority of bolus remaining   Location   A - Floor of Mouth, B - Palate and C - Tongue    Component 6--Initiation of Pharyngeal Swallow  Judged at first movement of the brisk superior-anterior hyoid trajectory. 3 - Bolus head in pyriforms      PHARYNGEAL Impairment  Component 7--Soft Palate Elevation  Judged during maximum displacement of soft palate. 0 - No bolus between the soft palate (SP)/pharyngeal wall (PW)    Component 8--Laryngeal Elevation  Judged when epiglottis is in its most horizontal position. 0 - Complete superior movement of thyroid cartilage with complete approximation of arytenoids to epiglottic petiole    Component 9--Anterior Hyoid Excursion  Judged at height of swallow/maximal anterior hyoid displacement. 0 - Complete anterior movement    Component 10--Epiglottic Movement  Judged at height of swallow/maximal anterior hyoid displacement. 1 - Partial inversion    Component 11--Laryngeal Vestibular Closure  Judged at height of swallow/maximal anterior hyoid displacement. 0 - Complete; no air/contrast in laryngeal vestibule    Component 12--Pharyngeal Stripping Wave  Judged during the full duration of the pharyngeal swallow. 0 - Present - complete    Component 13--Pharyngeal Contraction  Judged in AP view at rest and throughout maximum movement of structures. 0 - Complete    Component 14--Pharyngoesophageal Segment Opening  Judged during maximum distension of PES and throughout opening and closure. 1 - Partial distension/partial duration; partial obstruction to flow    Component 15--Tongue Base (TB) Retraction  Judged during maximum retraction of the tongue base. 2 - Narrow column of contrast or air between TB and PW    Component 16--Pharyngeal Residue  Judged after first swallow or after the last swallow of the sequential swallow task.   2 - Collection of residue within or on pharyngeal structures   Location   B - Valleculae and E - Pyriform sinuses      ESOPHAGEAL Impairment  Component 17--Esophageal Clearance Upright Position  Judged in AP view during bolus transit through the oral cavity to the LES  0 - Complete clearance; esophageal coating

## 2023-08-16 NOTE — CASE MANAGEMENT
Case Management Discharge Planning Note    Patient name Johana Langley  Location [facility] 704/[facility] 735-15 MRN [id number]  : 1985 Date 2023       Current Admission Date: 2023  Current Admission Diagnosis:Encephalopathy   Patient Active Problem List    Diagnosis Date Noted   • Encephalopathy 2023   • Acute respiratory failure ([address]) 2023   • Catatonic reaction    • Somnolence 2023   • Constipation 2023   • Wheezing 2023   • Testicular cancer ([address]) 2023   • Obesity hypoventilation syndrome ([address]) 2023   • Fall 2023   • Hyponatremia 2023   • Hypokalemia 2023   • Bipolar I disorder ([address]) 2023   • (HFpEF) heart failure with preserved ejection fraction ([address]) 2023   • Chronic diastolic heart failure ([address]) 2023   • Palpitations 2023   • LVH (left ventricular hypertrophy) 2023   • Pure hypertriglyceridemia 2023   • Cerebral gliosis 2023   • Unilateral vestibular schwannoma ([address]) 2023   • Port-A-Cath in place 2023   • Diplopia 2023   • 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   • Urinary hesitancy    • Umbilical hernia without obstruction and without gangrene 12/10/2022   • Tobacco use 12/10/2022   • Retroperitoneal lymphadenopathy 12/10/2022   • Hypertension 12/10/2022      LOS (days): 5  Geometric Mean LOS (GMLOS) (days):   Days to GMLOS:     OBJECTIVE:  Risk of Unplanned Readmission Score: 30.03         Current admission status: Inpatient   Preferred Pharmacy:   [address], [address]  Phone: [phone] Fax: [phone]    Primary Care Provider: Noelle Richards MD    Primary Insurance: [facility]  Secondary Insurance:     DISCHARGE DETAILS:           Other Referral/Resources/Interventions Provided:  Interventions: Short Term Rehab, C  Referral Comments: S/w Mother Arie Ruggiero who has decided for pt to NOT go to [facility] and be discharged to the home of her sister Mahesh Azar as t here will be around the clock supervision and care for him there. Reviewed last PT note with Mother so she is aware of current level of function. Sister Mahesh Azar resides at [address] [place] [address]. Mother Kerrie Rosales requesting any DME pt will be requiring to be ordered to her sisters home. She is requesting a hospital bed and [facility] specifically. She is coming in at 1430 today to meet with MD and RISHABH to discuss. She is also requesting Home PT/OT services, does not have any preference to an agency, reviewed [facility] and she is agreeable with blanket referral for VNA. Referrals placed on AIDIN.          Treatment Team Recommendation: Short Term Rehab  Discharge Destination Plan[de-identified] Home with [address] [facility]

## 2023-08-16 NOTE — PLAN OF CARE
Problem: PHYSICAL THERAPY ADULT  Goal: Performs mobility at highest level of function for planned discharge setting. See evaluation for individualized goals. Description: Treatment/Interventions: Functional transfer training, LE strengthening/ROM, Therapeutic exercise, Elevations, Endurance training, Cognitive reorientation, Patient/family training, Equipment eval/education, Bed mobility, Gait training, Spoke to nursing, Spoke to case management, OT, ST, Family  Equipment Recommended:  (TBD)       See flowsheet documentation for full assessment, interventions and recommendations. Outcome: Progressing  Note: Prognosis: Good  Problem List: Decreased endurance, Impaired balance, Decreased mobility, Decreased cognition, Impaired judgement, Decreased safety awareness  Assessment: The patient continues to remain limited with decreased balance and overall activity tolerance. He required increased time for all mobility as well as to follow instructions. He does require assistance on and off of the bed and stretcher. He was leaving for a test, so further mobility was unable to be performed at this time. Barriers to Discharge: Decreased caregiver support     PT Discharge Recommendation: Post acute rehabilitation services    See flowsheet documentation for full assessment.

## 2023-08-16 NOTE — PLAN OF CARE
Problem: Potential for Falls  Goal: Patient will remain free of falls  Description: INTERVENTIONS:  - Educate patient/family on patient safety including physical limitations  - Instruct patient to call for assistance with activity   - Consult OT/PT to assist with strengthening/mobility   - Keep Call bell within reach  - Keep bed low and locked with side rails adjusted as appropriate  - Keep care items and personal belongings within reach  - Initiate and maintain comfort rounds  - Make Fall Risk Sign visible to staff  - Apply yellow socks and bracelet for high fall risk patients  - Consider moving patient to room near nurses station  Outcome: Progressing     Problem: MOBILITY - ADULT  Goal: Maintain or return to baseline ADL function  Description: INTERVENTIONS:  -  Assess patient's ability to carry out ADLs; assess patient's baseline for ADL function and identify physical deficits which impact ability to perform ADLs (bathing, care of mouth/teeth, toileting, grooming, dressing, etc.)  - Assess/evaluate cause of self-care deficits   - Assess range of motion  - Assess patient's mobility; develop plan if impaired  - Assess patient's need for assistive devices and provide as appropriate  - Encourage maximum independence but intervene and supervise when necessary  - Involve family in performance of ADLs  - Assess for home care needs following discharge   - Consider OT consult to assist with ADL evaluation and planning for discharge  - Provide patient education as appropriate  Outcome: Progressing  Goal: Maintains/Returns to pre admission functional level  Description: INTERVENTIONS:  - Perform BMAT or MOVE assessment daily.   - Set and communicate daily mobility goal to care team and patient/family/caregiver.    - Collaborate with rehabilitation services on mobility goals if consulted  - Out of bed for toileting  - Record patient progress and toleration of activity level   Outcome: Progressing     Problem: Prexisting or High Potential for Compromised Skin Integrity  Goal: Skin integrity is maintained or improved  Description: INTERVENTIONS:  - Identify patients at risk for skin breakdown  - Assess and monitor skin integrity  - Assess and monitor nutrition and hydration status  - Monitor labs   - Assess for incontinence   - Turn and reposition patient  - Assist with mobility/ambulation  - Relieve pressure over bony prominences  - Avoid friction and shearing  - Provide appropriate hygiene as needed including keeping skin clean and dry  - Evaluate need for skin moisturizer/barrier cream  - Collaborate with interdisciplinary team   - Patient/family teaching  - Consider wound care consult   Outcome: Progressing     Problem: PAIN - ADULT  Goal: Verbalizes/displays adequate comfort level or baseline comfort level  Description: Interventions:  - Encourage patient to monitor pain and request assistance  - Assess pain using appropriate pain scale  - Administer analgesics based on type and severity of pain and evaluate response  - Implement non-pharmacological measures as appropriate and evaluate response  - Consider cultural and social influences on pain and pain management  - Notify physician/advanced practitioner if interventions unsuccessful or patient reports new pain  Outcome: Progressing     Problem: INFECTION - ADULT  Goal: Absence or prevention of progression during hospitalization  Description: INTERVENTIONS:  - Assess and monitor for signs and symptoms of infection  - Monitor lab/diagnostic results  - Monitor all insertion sites, i.e. indwelling lines, tubes, and drains  - Monitor endotracheal if appropriate and nasal secretions for changes in amount and color  - [place] appropriate cooling/warming therapies per order  - Administer medications as ordered  - Instruct and encourage patient and family to use good hand hygiene technique  - Identify and instruct in appropriate isolation precautions for identified infection/condition  Outcome: Progressing     Problem: SAFETY ADULT  Goal: Patient will remain free of falls  Description: INTERVENTIONS:  - Educate patient/family on patient safety including physical limitations  - Instruct patient to call for assistance with activity   - Consult OT/PT to assist with strengthening/mobility   - Keep Call bell within reach  - Keep bed low and locked with side rails adjusted as appropriate  - Keep care items and personal belongings within reach  - Initiate and maintain comfort rounds  - Make Fall Risk Sign visible to staff  - Offer Toileting every 2Hours, in advance of need  - Initiate/Maintain bed alarm  - Obtain necessary fall risk management equipment:   - Apply yellow socks and bracelet for high fall risk patients  - Consider moving patient to room near nurses station  Outcome: Progressing  Goal: Maintain or return to baseline ADL function  Description: INTERVENTIONS:  -  Assess patient's ability to carry out ADLs; assess patient's baseline for ADL function and identify physical deficits which impact ability to perform ADLs (bathing, care of mouth/teeth, toileting, grooming, dressing, etc.)  - Assess/evaluate cause of self-care deficits   - Assess range of motion  - Assess patient's mobility; develop plan if impaired  - Assess patient's need for assistive devices and provide as appropriate  - Encourage maximum independence but intervene and supervise when necessary  - Involve family in performance of ADLs  - Assess for home care needs following discharge   - Consider OT consult to assist with ADL evaluation and planning for discharge  - Provide patient education as appropriate  Outcome: Progressing  Goal: Maintains/Returns to pre admission functional level  Description: INTERVENTIONS:  - Perform BMAT or MOVE assessment daily.   - Set and communicate daily mobility goal to care team and patient/family/caregiver.    - Collaborate with rehabilitation services on mobility goals if consulte  - Ambulate patient 3 times a day  - Out of bed for toileting  - Record patient progress and toleration of activity level   Outcome: Progressing     Problem: DISCHARGE PLANNING  Goal: Discharge to home or other facility with appropriate resources  Description: INTERVENTIONS:  - Identify barriers to discharge w/patient and caregiver  - Arrange for needed discharge resources and transportation as appropriate  - Identify discharge learning needs (meds, wound care, etc.)  - Arrange for interpretive services to assist at discharge as needed  - Refer to Case Management Department for coordinating discharge planning if the patient needs post-hospital services based on physician/advanced practitioner order or complex needs related to functional status, cognitive ability, or social support system  Outcome: Progressing     Problem: Knowledge Deficit  Goal: Patient/family/caregiver demonstrates understanding of disease process, treatment plan, medications, and discharge instructions  Description: Complete learning assessment and assess knowledge base. Interventions:  - Provide teaching at level of understanding  - Provide teaching via preferred learning methods  Outcome: Progressing     Problem: Nutrition/Hydration-ADULT  Goal: Nutrient/Hydration intake appropriate for improving, restoring or maintaining nutritional needs  Description: Monitor and assess patient's nutrition/hydration status for malnutrition. Collaborate with interdisciplinary team and initiate plan and interventions as ordered. Monitor patient's weight and dietary intake as ordered or per policy. Utilize nutrition screening tool and intervene as necessary. Determine patient's food preferences and provide high-protein, high-caloric foods as appropriate.      INTERVENTIONS:  - Monitor oral intake, urinary output, labs, and treatment plans  - Assess nutrition and hydration status and recommend course of action  - Evaluate amount of meals eaten  - Assist patient with eating if necessary   - Allow adequate time for meals  - Recommend/ encourage appropriate diets, oral nutritional supplements, and vitamin/mineral supplements  - Order, calculate, and assess calorie counts as needed  - Recommend, monitor, and adjust tube feedings and TPN/PPN based on assessed needs  - Assess need for intravenous fluids  - Provide specific nutrition/hydration education as appropriate  - Include patient/family/caregiver in decisions related to nutrition  Outcome: Progressing     Problem: COPING  Goal: Pt/Family able to verbalize concerns and demonstrate effective coping strategies  Description: INTERVENTIONS:  - Assist patient/family to identify coping skills, available support systems and cultural and spiritual values  - Provide emotional support, including active listening and acknowledgement of concerns of patient and caregivers  - Reduce environmental stimuli, as able  - Provide patient education  - Assess for spiritual pain/suffering and initiate spiritual care, including notification of Pastoral Care or melody based community as needed  - Assess effectiveness of coping strategies  Outcome: Progressing  Goal: Will report anxiety at manageable levels  Description: INTERVENTIONS:  - Administer medication as ordered  - Teach and encourage coping skills  - Provide emotional support  - Assess patient/family for anxiety and ability to cope  Outcome: Progressing     Problem: NEUROSENSORY - ADULT  Goal: Achieves stable or improved neurological status  Description: INTERVENTIONS  - Monitor and report changes in neurological status  - Monitor vital signs such as temperature, blood pressure, glucose, and any other labs ordered   - Initiate measures to prevent increased intracranial pressure  - Monitor for seizure activity and implement precautions if appropriate      Outcome: Progressing  Goal: Achieves maximal functionality and self care  Description: INTERVENTIONS  - Monitor swallowing and airway patency with patient fatigue and changes in neurological status  - Encourage and assist patient to increase activity and self care.    - Encourage visually impaired, hearing impaired and aphasic patients to use assistive/communication devices  Outcome: Progressing     Problem: CARDIOVASCULAR - ADULT  Goal: Maintains optimal cardiac output and hemodynamic stability  Description: INTERVENTIONS:  - Monitor I/O, vital signs and rhythm  - Monitor for S/S and trends of decreased cardiac output  - Administer and titrate ordered vasoactive medications to optimize hemodynamic stability  - Assess quality of pulses, skin color and temperature  - Assess for signs of decreased coronary artery perfusion  - Instruct patient to report change in severity of symptoms  Outcome: Progressing  Goal: Absence of cardiac dysrhythmias or at baseline rhythm  Description: INTERVENTIONS:  - Continuous cardiac monitoring, vital signs, obtain 12 lead EKG if ordered  - Administer antiarrhythmic and heart rate control medications as ordered  - Monitor electrolytes and administer replacement therapy as ordered  Outcome: Progressing     Problem: RESPIRATORY - ADULT  Goal: Achieves optimal ventilation and oxygenation  Description: INTERVENTIONS:  - Assess for changes in respiratory status  - Assess for changes in mentation and behavior  - Position to facilitate oxygenation and minimize respiratory effort  - Oxygen administered by appropriate delivery if ordered  - Initiate smoking cessation education as indicated  - Encourage broncho-pulmonary hygiene including cough, deep breathe, Incentive Spirometry  - Assess the need for suctioning and aspirate as needed  - Assess and instruct to report SOB or any respiratory difficulty  - Respiratory Therapy support as indicated  Outcome: Progressing     Problem: GASTROINTESTINAL - ADULT  Goal: Maintains or returns to baseline bowel function  Description: INTERVENTIONS:  - Assess bowel function  - Encourage oral fluids to ensure adequate hydration  - Administer IV fluids if ordered to ensure adequate hydration  - Administer ordered medications as needed  - Encourage mobilization and activity  - Consider nutritional services referral to assist patient with adequate nutrition and appropriate food choices  Outcome: Progressing  Goal: Maintains adequate nutritional intake  Description: INTERVENTIONS:  - Monitor percentage of each meal consumed  - Identify factors contributing to decreased intake, treat as appropriate  - Assist with meals as needed  - Monitor I&O, weight, and lab values if indicated  - Obtain nutrition services referral as needed  Outcome: Progressing     Problem: GENITOURINARY - ADULT  Goal: Maintains or returns to baseline urinary function  Description: INTERVENTIONS:  - Assess urinary function  - Encourage oral fluids to ensure adequate hydration if ordered  - Administer IV fluids as ordered to ensure adequate hydration  - Administer ordered medications as needed  - Offer frequent toileting  - Follow urinary retention protocol if ordered  Outcome: Progressing  Goal: Absence of urinary retention  Description: INTERVENTIONS:  - Assess patient’s ability to void and empty bladder  - Monitor I/O  - Bladder scan as needed  - Discuss with physician/AP medications to alleviate retention as needed  - Discuss catheterization for long term situations as appropriate  Outcome: Progressing  Goal: Urinary catheter remains patent  Description: INTERVENTIONS:  - Assess patency of urinary catheter  - If patient has a chronic puga, consider changing catheter if non-functioning  - Follow guidelines for intermittent irrigation of non-functioning urinary catheter  Outcome: Progressing     Problem: METABOLIC, FLUID AND ELECTROLYTES - ADULT  Goal: Electrolytes maintained within normal limits  Description: INTERVENTIONS:  - Monitor labs and assess patient for signs and symptoms of electrolyte imbalances  - Administer electrolyte replacement as ordered  - Monitor response to electrolyte replacements, including repeat lab results as appropriate  - Instruct patient on fluid and nutrition as appropriate  Outcome: Progressing  Goal: Fluid balance maintained  Description: INTERVENTIONS:  - Monitor labs   - Monitor I/O and WT  - Instruct patient on fluid and nutrition as appropriate  - Assess for signs & symptoms of volume excess or deficit  Outcome: Progressing     Problem: SKIN/TISSUE INTEGRITY - ADULT  Goal: Skin Integrity remains intact(Skin Breakdown Prevention)    Skin Care:  -Avoid use of baby powder, tape, friction and shearing, hot water or constrictive clothing    Goal: Pressure injury heals and does not worsen  Description: Interventions:  - Implement low air loss mattress or specialty surface (Criteria met)  - Apply silicone foam dressing    Outcome: Progressing     Problem: HEMATOLOGIC - ADULT  Goal: Maintains hematologic stability  Description: INTERVENTIONS  - Assess for signs and symptoms of bleeding or hemorrhage  - Monitor labs  - Administer supportive blood products/factors as ordered and appropriate  Outcome: Progressing     Problem: MUSCULOSKELETAL - ADULT  Goal: Maintain or return mobility to safest level of function  Description: INTERVENTIONS:  - Assess patient's ability to carry out ADLs; assess patient's baseline for ADL function and identify physical deficits which impact ability to perform ADLs (bathing, care of mouth/teeth, toileting, grooming, dressing, etc.)  - Assess/evaluate cause of self-care deficits   - Assess range of motion  - Assess patient's mobility  - Assess patient's need for assistive devices and provide as appropriate  - Encourage maximum independence but intervene and supervise when necessary  - Involve family in performance of ADLs  - Assess for home care needs following discharge   - Consider OT consult to assist with ADL evaluation and planning for discharge  - Provide patient education as appropriate  Outcome: Progressing

## 2023-08-16 NOTE — RESTORATIVE TECHNICIAN NOTE
Restorative Technician Note      Patient Name: Jessica Thomson     Note Type: Mobility  Patient Position Upon Consult: Supine  Activity Performed: Ambulated; Dangled; Stood  Assistive Device: Other (Comment) (Assist x1-2)  Education Provided: Yes  Patient Position at End of Consult: Supine; Bed/Chair alarm activated;  All needs within reach      Guevara PENG, Restorative Technician, [employer]

## 2023-08-16 NOTE — PHYSICAL THERAPY NOTE
Physical Therapy Progress Note     08/16/23 1005   PT Last Visit   PT Visit Date 08/16/23   Note Type   Note Type Treatment   Pain Assessment   Pain Assessment Tool 0-10   Pain Score No Pain   Restrictions/Precautions   Other Precautions Cognitive; Chair Alarm; Bed Alarm; Fall Risk   Subjective   Subjective The patient states that he is tired this morning. Bed Mobility   Supine to Sit 4  Minimal assistance   Additional items Assist x 1; Increased time required;Verbal cues;LE management   Sit to Supine 4  Minimal assistance   Additional items Assist x 1; Increased time required;Verbal cues;LE management   Transfers   Sit to Stand 4  Minimal assistance   Additional items Assist x 1; Increased time required;Verbal cues   Stand to Sit 4  Minimal assistance   Additional items Assist x 1; Increased time required;Verbal cues   Ambulation/Elevation   Gait pattern Excessively slow; Inconsistent alka; Improper Weight shift; Wide JUDIE   Gait Assistance 4  Minimal assist   Additional items Assist x 1;Verbal cues   Assistive Device Other (Comment)  (Hand-hold assistance.)   Distance 100 feet. Balance   Static Sitting Fair +   Dynamic Sitting Fair   Static Standing Fair -   Ambulatory Poor +   Activity Tolerance   Activity Tolerance Patient tolerated treatment well;Patient limited by fatigue   Nurse Made Aware Yes. Assessment   Prognosis Good   Problem List Decreased endurance; Impaired balance;Decreased mobility; Decreased cognition; Impaired judgement;Decreased safety awareness   Assessment The patient continues to remain limited with decreased balance and overall activity tolerance. He required increased time for all mobility as well as to follow instructions. He does require assistance on and off of the bed and stretcher. He was leaving for a test, so further mobility was unable to be performed at this time. Barriers to Discharge Decreased caregiver support   Goals   Patient Goals None expressed.    STG Expiration Date 08/28/23 PT Treatment Day 2   Plan   Treatment/Interventions Functional transfer training;LE strengthening/ROM; Therapeutic exercise;Elevations; Endurance training;Cognitive reorientation;Patient/family training;Bed mobility;Gait training   Progress Progressing toward goals   PT Frequency 2-3x/wk   Recommendation   PT Discharge Recommendation Post acute rehabilitation services   AM-[facility] Basic Mobility Inpatient   Turning in Flat Bed Without Bedrails 3   Lying on Back to Sitting on Edge of Flat Bed Without Bedrails 3   Moving Bed to Chair 3   Standing Up From Chair Using Arms 3   Walk in Room 3   Climb 3-5 Stairs With Railing 2   Basic Mobility Inpatient Raw Score 17   Basic Mobility Standardized Score 39.67   Highest Level Of Mobility   JH-HLM Goal 5: Stand one or more mins   JH-HLM Achieved 7: Walk 25 feet or more         An AM-PAC Basic Mobility raw score less than 16 suggests the patient may benefit from discharge to post-acute rehab services.     Forrest Triplett, PTA

## 2023-08-16 NOTE — PROGRESS NOTES
Progress Note - Behavioral Health   Carlo Boo 41 y.o. male MRN: [id number]  Unit/Bed#: [facility] 704-01 Encounter: [id number]      I came to see the patient for continuation of care, today he is more awake, he is answering questions he states that he feels better he is looking forward to go home. I spoke to his girlfriend who was present in the room, she stated that this is his baseline. She stated that he started changing his after he started his chemotherapy, at that time he became depressed and was started on medication by the primary physician. He speaks minimal, take time to answer questions but interact with them,   Patient states that he has a good appetite and he is sleeping too much.           Behavior over the last 24 hours:  improved  Sleep: hypersomnia  Appetite: normal  Medication side effects: No  ROS: no complaints    Mental Status Evaluation:  Appearance:  age appropriate   Behavior:  normal   Speech:  soft   Mood:  euthymic   Affect:  mood-congruent   Language: naming objects and repeating phrases   Thought Process:  goal directed   Associations: intact associations   Thought Content:  normal   Perceptual Disturbances: None   Risk Potential: Suicidal Ideations none, Homicidal Ideations none and Potential for Aggression No   Sensorium:  person and place   Memory:  recent and remote memory grossly intact   Cognition:  recent and remote memory grossly intact   Consciousness:  alert and awake    Attention: attention span appeared shorter than expected for age   Intellect: not examined   Fund of Knowledge: awareness of current events: Not examined    Insight:  fair   Judgment: fair   Muscle Strength and Tone: Within normal limits   Gait/Station: Unsteady   Motor Activity: no abnormal movements         Assessment/Plan  Carlo Boo is a 41 y.o. male with a history of seminoma of the left testis, hypertension, cerebral gliosis, depression, chronic diastolic heart failure who presented to the hospital with somnolence and changes in mental status. After talking to her girlfriend she states that today he is at baseline, she also states that he has depression after he started with a chemotherapy but he has no prior psychiatric history. After I  Reviewed with the family the  patient does not have symptoms of bipolar disorder and he has depression secondary to his medical issues. Patient does not have any hallucinations, does not have any active psychotic, denies suicidal or homicidal ideation plan or intent    Diagnosis:    Depressive disorder unspecified type    Recommended Treatment:   Continue medical management  Continue Prozac 10 mg p.o. daily  Discontinue Seroquel  Follow-up with a primary physician on discharge  Discussed with the primary team  No other intervention at this time  I will sign off        Medications:   current meds:   Current Facility-Administered Medications   Medication Dose Route Frequency   • enoxaparin (LOVENOX) subcutaneous injection 40 mg  40 mg Subcutaneous Q12H [address]   • famotidine (PEPCID) tablet 20 mg  20 mg Oral Q12H [address]   • FLUoxetine (PROzac) capsule 10 mg  10 mg Oral Daily   • furosemide (LASIX) injection 20 mg  20 mg Intravenous BID (diuretic)   • ondansetron (ZOFRAN) injection 4 mg  4 mg Intravenous Q6H PRN   • polyethylene glycol (MIRALAX) packet 17 g  17 g Oral Daily   • senna (SENOKOT) tablet 17.2 mg  2 tablet Oral BID   • thiamine tablet 500 mg  500 mg Oral TID    Followed by   • [START ON 8/17/2023] thiamine tablet 100 mg  100 mg Oral Daily   • [START ON 8/17/2023] torsemide (DEMADEX) tablet 20 mg  20 mg Oral Daily         Risks, benefits and possible side effects of Medications:     Risks, benefits, and possible side effects of medications explained to patient and patient verbalizes understanding. Labs: I have personally reviewed all pertinent laboratory results. I have personally reviewed all pertinent laboratory/tests results.   Labs in last 72 hours: Recent Labs     08/14/23  1827 08/15/23  0503   WBC 8.82  --    RBC 3.56*  --    HGB 11.7*  --    HCT 34.9*  --      --    RDW 14.4  --    NEUTROABS 6.26  --    SODIUM  --  135   K  --  3.4*   CL  --  100   CO2  --  31   BUN  --  7   CREATININE  --  0.97   GLUC  --  96   CALCIUM  --  9.3   AST  --  29   ALT  --  111*   ALKPHOS  --  103   TP  --  6.6   ALB  --  3.1*   TBILI  --  0.41       Counseling / Coordination of Care  Total floor / unit time spent today 40 minutes. Greater than 50% of total time was spent with the patient and / or family counseling and / or coordination of care.  A description of the counseling / coordination of care: Talking to patient, his girlfriend and primary team    Coby Barnes MD

## 2023-08-17 ENCOUNTER — TELEPHONE (OUTPATIENT)
Dept: NEUROSURGERY | Facility: CLINIC | Age: 38
End: 2023-08-17

## 2023-08-17 ENCOUNTER — HOME HEALTH ADMISSION (OUTPATIENT)
Dept: HOME HEALTH SERVICES | Facility: HOME HEALTHCARE | Age: 38
End: 2023-08-17
Payer: COMMERCIAL

## 2023-08-17 VITALS
RESPIRATION RATE: 17 BRPM | BODY MASS INDEX: 38.21 KG/M2 | DIASTOLIC BLOOD PRESSURE: 84 MMHG | OXYGEN SATURATION: 96 % | SYSTOLIC BLOOD PRESSURE: 121 MMHG | WEIGHT: 313.94 LBS | HEART RATE: 73 BPM | TEMPERATURE: 98 F

## 2023-08-17 PROBLEM — R40.0 SOMNOLENCE: Status: RESOLVED | Noted: 2023-08-11 | Resolved: 2023-08-17

## 2023-08-17 PROBLEM — J96.00 ACUTE RESPIRATORY FAILURE (HCC): Status: RESOLVED | Noted: 2023-08-12 | Resolved: 2023-08-17

## 2023-08-17 LAB
AMPHETAMINES UR QL SCN: NEGATIVE NG/ML
ANION GAP SERPL CALCULATED.3IONS-SCNC: 6 MMOL/L
BARBITURATES UR QL SCN: NEGATIVE NG/ML
BENZODIAZ UR QL: NEGATIVE NG/ML
BUN SERPL-MCNC: 6 MG/DL (ref 5–25)
BZE UR QL: NEGATIVE NG/ML
CALCIUM SERPL-MCNC: 9.2 MG/DL (ref 8.3–10.1)
CANNABINOIDS UR QL SCN: POSITIVE
CHLORIDE SERPL-SCNC: 95 MMOL/L (ref 96–108)
CO2 SERPL-SCNC: 31 MMOL/L (ref 21–32)
CREAT SERPL-MCNC: 0.85 MG/DL (ref 0.6–1.3)
DME PARACHUTE DELIVERY DATE ACTUAL: NORMAL
DME PARACHUTE DELIVERY DATE EXPECTED: NORMAL
DME PARACHUTE DELIVERY DATE EXPECTED: NORMAL
DME PARACHUTE DELIVERY DATE REQUESTED: NORMAL
DME PARACHUTE DELIVERY NOTE: NORMAL
DME PARACHUTE DELIVERY NOTE: NORMAL
DME PARACHUTE ITEM DESCRIPTION: NORMAL
DME PARACHUTE ORDER STATUS: NORMAL
DME PARACHUTE SUPPLIER NAME: NORMAL
DME PARACHUTE SUPPLIER PHONE: NORMAL
ERYTHROCYTE [DISTWIDTH] IN BLOOD BY AUTOMATED COUNT: 14.3 % (ref 11.6–15.1)
GFR SERPL CREATININE-BSD FRML MDRD: 112 ML/MIN/1.73SQ M
GLUCOSE SERPL-MCNC: 108 MG/DL (ref 65–140)
GLUCOSE SERPL-MCNC: 85 MG/DL (ref 65–140)
HCT VFR BLD AUTO: 36.2 % (ref 36.5–49.3)
HGB BLD-MCNC: 11.6 G/DL (ref 12–17)
MAGNESIUM SERPL-MCNC: 2 MG/DL (ref 1.6–2.6)
MCH RBC QN AUTO: 31.9 PG (ref 26.8–34.3)
MCHC RBC AUTO-ENTMCNC: 32 G/DL (ref 31.4–37.4)
MCV RBC AUTO: 100 FL (ref 82–98)
METHADONE UR QL SCN: NEGATIVE NG/ML
OPIATES UR QL: NEGATIVE NG/ML
PCP UR QL: NEGATIVE NG/ML
PLATELET # BLD AUTO: 260 THOUSANDS/UL (ref 149–390)
PMV BLD AUTO: 9.1 FL (ref 8.9–12.7)
POTASSIUM SERPL-SCNC: 3.7 MMOL/L (ref 3.5–5.3)
PROPOXYPH UR QL SCN: NEGATIVE NG/ML
RBC # BLD AUTO: 3.64 MILLION/UL (ref 3.88–5.62)
SODIUM SERPL-SCNC: 132 MMOL/L (ref 135–147)
WBC # BLD AUTO: 6.77 THOUSAND/UL (ref 4.31–10.16)

## 2023-08-17 PROCEDURE — 83735 ASSAY OF MAGNESIUM: CPT | Performed by: INTERNAL MEDICINE

## 2023-08-17 PROCEDURE — 80048 BASIC METABOLIC PNL TOTAL CA: CPT | Performed by: INTERNAL MEDICINE

## 2023-08-17 PROCEDURE — 85027 COMPLETE CBC AUTOMATED: CPT | Performed by: INTERNAL MEDICINE

## 2023-08-17 PROCEDURE — 82948 REAGENT STRIP/BLOOD GLUCOSE: CPT

## 2023-08-17 PROCEDURE — 92526 ORAL FUNCTION THERAPY: CPT

## 2023-08-17 PROCEDURE — 99239 HOSP IP/OBS DSCHRG MGMT >30: CPT | Performed by: INTERNAL MEDICINE

## 2023-08-17 RX ORDER — LANOLIN ALCOHOL/MO/W.PET/CERES
100 CREAM (GRAM) TOPICAL DAILY
Qty: 30 TABLET | Refills: 0 | Status: ON HOLD | OUTPATIENT
Start: 2023-08-17

## 2023-08-17 RX ORDER — POTASSIUM CHLORIDE 750 MG/1
10 TABLET, EXTENDED RELEASE ORAL DAILY
Qty: 30 TABLET | Refills: 0 | Status: SHIPPED | OUTPATIENT
Start: 2023-08-17 | End: 2023-08-20 | Stop reason: SDUPTHER

## 2023-08-17 RX ADMIN — TORSEMIDE 20 MG: 20 TABLET ORAL at 09:21

## 2023-08-17 RX ADMIN — THIAMINE HCL TAB 100 MG 100 MG: 100 TAB at 15:58

## 2023-08-17 RX ADMIN — THIAMINE HCL TAB 100 MG 500 MG: 100 TAB at 01:45

## 2023-08-17 RX ADMIN — SENNOSIDES 17.2 MG: 8.6 TABLET, FILM COATED ORAL at 17:09

## 2023-08-17 RX ADMIN — FAMOTIDINE 20 MG: 20 TABLET, FILM COATED ORAL at 09:21

## 2023-08-17 RX ADMIN — ENOXAPARIN SODIUM 40 MG: 40 INJECTION SUBCUTANEOUS at 09:21

## 2023-08-17 RX ADMIN — FLUOXETINE 10 MG: 10 CAPSULE ORAL at 09:21

## 2023-08-17 NOTE — ASSESSMENT & PLAN NOTE
Patient with history of HFpEF, seminomatous testicular cancer, EtOH use, presented from [address] [place] [address] on 8/11 with worsening somnolence. He was recently hospitalized from 7/28 - 8/5/2023 due to frequent falls and CHF exacerbation. Initially admitted to med/surg but ICU called due to low GCS, and patient was intubated for airway protection. He was extubated the morning of 8/13 due to improving mental status. CSF studies have been mostly unrevealing. Per neurology, encephalopathy may be Wernicke's due to improvement after being given high dose thiamine replacement. Other potential causes include narcolepsy due to decreased sleep latency and increased REM on EEG. Less likely autoimmune or psychogenic. • 8/12 MRI - Stable nonspecific enhancement along the anterior floor of third ventricle/hyopthalamus.    • Video EEG - no seizures  • High-dose thiamine changed to oral -transition to 100 mg daily after 5 days of 500 mg 3 times daily

## 2023-08-17 NOTE — PROGRESS NOTES
[facility]  Progress Note  Name: Mitzy Trujillo  MRN: [id number]  Unit/Bed#: [facility] 224-12 I Date of Admission: 8/11/2023   Date of Service: 8/16/2023 I Hospital Day: 5    Assessment/Plan   * Encephalopathy  Assessment & Plan  Patient with history of HFpEF, seminomatous testicular cancer, EtOH use, presented from [address] [place] [address] Road on 8/11 with worsening somnolence. He was recently hospitalized from 7/28 - 8/5/2023 due to frequent falls and CHF exacerbation. Initially admitted to med/surg but ICU called due to low GCS, and patient was intubated for airway protection. He was extubated the morning of 8/13 due to improving mental status. CSF studies have been mostly unrevealing. Per neurology, encephalopathy may be Wernicke's due to improvement after being given high dose thiamine replacement. Other potential causes include narcolepsy due to decreased sleep latency and increased REM on EEG. Less likely autoimmune or psychogenic. • 8/12 MRI - Stable nonspecific enhancement along the anterior floor of third ventricle/hyopthalamus. 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 Chemotherapy started in March 2023 due to enlarged para-aortic lymph nodes and increase in LDH. Chemo sopped in May 2023 due to toxicity. CTAP 5/9/2023: 2 para-aortic lymph nodes remain minimally increased in size, unchanged. One of these was biopsied and returned back as necrosis without viable tumor in January. Sen by onc - MRI and CT with no evidence of germ cell tumor recurrence  AFP - 2.54, LDH - 296, HCG neg     Plan:  • Outpatient follow-up    Hypertension  Assessment & Plan  Hx of HTN.  Only on torsemide 20 mg daily at home and has been controlled while inpatient  Was on Lasix 20 mg IV twice daily -  restart torsemide from 8/17    Chronic diastolic heart failure [facility])  Assessment & Plan  Wt Readings from Last 3 Encounters:   08/16/23 (!) 142 kg (312 lb 9.8 oz)   08/05/23 (!) 154 kg (339 lb 8.1 oz)   07/19/23 (!) 140 kg (309 lb 9.6 oz)     Patient takes 20 torsemide daily at home. Echo 6/2/2023 - LVEF 65% Mildly increased wall thickness. Mild concentric hypertrophy. ECG 8/11 showed sinus rhythm with first degree AV block  Dry weight appears to be around 140 kg based on weight at cardiologist on 7/19. Trace pedal edema on exam.      Plan:  • Change IV lasix to Torsemide from am    Fall  Assessment & Plan  Presented to the ED last admission 7/28 due to falls and headstrike. Etiology unclear at the time. Has a previous history of alcohol abuse. Per neuro may reflect Wernicke's.     Plan:  • PT/OT   • Refer to A&P for encephalopathy    Acute respiratory failure [facility])  Assessment & Plan  Required mechanical ventilation 8/12-8/13 for airway protection. Extubated morning of 8/13 due to improving mental status.     Plan:  • Weaned to RA     Depression  Assessment & Plan  · Discussed with psychiatry -continue fluoxetine 10 mg daily  · Hold Seroquel indefinitely as no evidence of bipolar disorder        VTE Pharmacologic Prophylaxis: VTE Score: 4 Moderate Risk (Score 3-4) - Pharmacological DVT Prophylaxis Ordered: enoxaparin (Lovenox). Patient Centered Rounds: Discussed with RN  Discussions with Specialists or Other Care Team Provider: Discussed with psychiatry    Education and Discussions with Family / Patient: Updated  (mother) at bedside.     Total Time Spent on Date of Encounter in care of patient: 35 minutes This time was spent on one or more of the following: performing physical exam; counseling and coordination of care; obtaining or reviewing history; documenting in the medical record; reviewing/ordering tests, medications or procedures; communicating with other healthcare professionals and discussing with patient's family/caregivers. Current Length of Stay: 5 day(s)  Current Patient Status: Inpatient   Certification Statement: The patient will continue to require additional inpatient hospital stay due to Awaiting DME delivery to home    Code Status: Level 1 - Full Code    Subjective:   More interactive today    Objective:     Vitals:   Temp (24hrs), Av.4 °F (36.9 °C), Min:97.7 °F (36.5 °C), Max:99.1 °F (37.3 °C)    Temp:  [97.7 °F (36.5 °C)-99.1 °F (37.3 °C)] 97.7 °F (36.5 °C)  HR:  [71-81] 80  Resp:  [16-18] 18  BP: (121-164)/(76-99) 164/99  SpO2:  [93 %-96 %] 93 %  Body mass index is 38.05 kg/m². Physical Exam:   Physical Exam  Vitals reviewed. HENT:      Head: Normocephalic. Nose: Nose normal.      Mouth/Throat:      Mouth: Mucous membranes are moist.   Eyes:      Extraocular Movements: Extraocular movements intact. Cardiovascular:      Rate and Rhythm: Normal rate and regular rhythm. Pulmonary:      Effort: Pulmonary effort is normal. No respiratory distress. Breath sounds: Normal breath sounds. No wheezing. Abdominal:      General: Bowel sounds are normal. There is no distension. Palpations: Abdomen is soft. Tenderness: There is no abdominal tenderness. Musculoskeletal:         General: No swelling. Cervical back: Neck supple. Skin:     General: Skin is warm and dry. Neurological:      General: No focal deficit present. Mental Status: He is alert.    Psychiatric:         Mood and Affect: Mood normal.          Additional Data:     Labs:  Results from last 7 days   Lab Units 23  1827   WBC Thousand/uL 8.82   HEMOGLOBIN g/dL 11.7*   HEMATOCRIT % 34.9*   PLATELETS Thousands/uL 263   NEUTROS PCT % 70   LYMPHS PCT % 16   MONOS PCT % 10   EOS PCT % 2     Results from last 7 days   Lab Units 08/15/23  0503   SODIUM mmol/L 135   POTASSIUM mmol/L 3.4*   CHLORIDE mmol/L 100   CO2 mmol/L 31   BUN mg/dL 7   CREATININE mg/dL 0.97 ANION GAP mmol/L 4   CALCIUM mg/dL 9.3   ALBUMIN g/dL 3.1*   TOTAL BILIRUBIN mg/dL 0.41   ALK PHOS U/L 103   ALT U/L 111*   AST U/L 29   GLUCOSE RANDOM mg/dL 96         Results from last 7 days   Lab Units 08/11/23  1626   POC GLUCOSE mg/dl 89         Results from last 7 days   Lab Units 08/14/23  1827 08/11/23  1656   LACTIC ACID mmol/L  --  1.1   PROCALCITONIN ng/ml 0.09 0.11       Lines/Drains:  Invasive Devices     Central Venous Catheter Line  Duration           Port A Cath 03/14/23 Right Internal jugular 155 days          Peripheral Intravenous Line  Duration           Peripheral IV 08/15/23 Right;Ventral (anterior) Wrist 1 day                Central Line:  Goal for removal: Maintain port    Recent Cultures (last 7 days):   Results from last 7 days   Lab Units 08/11/23 2234   GRAM STAIN RESULT  Rare Polys  Rare Mononuclear Cells  No bacteria seen       Last 24 Hours Medication List:   Current Facility-Administered Medications   Medication Dose Route Frequency Provider Last Rate   • enoxaparin  40 mg Subcutaneous Q12H [address] Gretchen Forrest MD     • famotidine  20 mg Oral Q12H [address] Viktoria Holliday MD     • FLUoxetine  10 mg Oral Daily Gretchen Forrest MD     • ondansetron  4 mg Intravenous Q6H PRN Rohini Vora MD     • polyethylene glycol  17 g Oral Daily Hetul Herring, DO     • senna  2 tablet Oral BID Hetul Herring, DO     • thiamine  500 mg Oral TID Apolinar Richards MD      Followed by   • [START ON 8/17/2023] thiamine  100 mg Oral Daily Apolinar Richards MD     • [START ON 8/17/2023] torsemide  20 mg Oral Daily Viktoria Holliday MD          Today, Patient Was Seen By: Viktoria Holliday MD    **Please Note: This note may have been constructed using a voice recognition system. **

## 2023-08-17 NOTE — SPEECH THERAPY NOTE
Speech-Language Pathology Progress Note    Patient Name: David UGARTE'S Date: 8/17/2023    Subjective:  Pt was awake and slow to respond. He was sitting upright in bed. Limited verbal output/flat affect. Objective:  Pt was seen today for dysphagia therapy. Current diet is dysphagia 2 mechanical soft with thin liquids s/p MBS yesterday. Pt was on room air. Focus of today's session was to maximize PO intake safety and improve pt's self monitoring. Textures offered today included mech soft soft solid and thin liquid via straw and self administered. Set up assistance provided. Swallow function:   Bolus retrieval and control were [facility] for the consistencies offered. Breakdown and Formation was/were adequate for mech soft. Pharyngeal swallow initiation was prompt. No s/s aspiration occurred during session today. Bites were large at times. SLP provided min cueing/feedback throughout session to reduce bite size and take drinks. Pt was responsive to cueing. Assessment:  Swallow function is stable with current diet. Diet texture and liquid consistency remains appropriate at this time. Plan:  Continue dysphagia 2 mechanical soft and thin liquids. Continue ST follow up. Subsequent sessions to focus on determining potential for diet texture advancement and improving pt's self monitoring.

## 2023-08-17 NOTE — CASE MANAGEMENT
Case Management Discharge Planning Note    Patient name Johana Langley  [facility] [id number]  : 1985 Date 2023       Current Admission Date: 2023  Current Admission Diagnosis:Encephalopathy   Patient Active Problem List    Diagnosis Date Noted   • Depression 2023   • Encephalopathy 2023   • Constipation 2023   • Wheezing 2023   • Testicular cancer ([address]) 2023   • Fall 2023   • Hyponatremia 2023   • Hypokalemia 2023   • (HFpEF) heart failure with preserved ejection fraction ([address]) 2023   • Chronic diastolic heart failure ([address]) 2023   • Palpitations 2023   • LVH (left ventricular hypertrophy) 2023   • Pure hypertriglyceridemia 2023   • Cerebral gliosis 2023   • Unilateral vestibular schwannoma ([address]) 2023   • Port-A-Cath in place 2023   • Diplopia 2023   • 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   • Urinary hesitancy    • Umbilical hernia without obstruction and without gangrene 12/10/2022   • Tobacco use 12/10/2022   • Retroperitoneal lymphadenopathy 12/10/2022   • Hypertension 12/10/2022      LOS (days): 6  Geometric Mean LOS (GMLOS) (days):   Days to GMLOS:     OBJECTIVE:  Risk of Unplanned Readmission Score: 27.41         Current admission status: Inpatient   Preferred Pharmacy:   [address] [place], [place] [address]d[address], [address]  Phone: [phone] Fax: [phone]    Primary Care Provider: Noelle Richards MD    Primary Insurance: [address]  Secondary Insurance:     DISCHARGE DETAILS:      [address]         Is the patient interested in [address] at discharge?: Yes  [facility] requested[de-identified] Physical Therapy, Speech Language Pathology, Occupational Therapy, [facility] Name[de-identified] [facility] Provider[de-identified] PCP  [facility] Needed[de-identified] Evaluate Functional Status and Safety, Gait/ADL Training, Strengthening/Theraputic Exercises to Improve Function, Wound/Ostomy Care (pressure ulcer wound care and education)  Homebound Criteria Met[de-identified] Requires Medical Transportation, Uses an Assist Device (i.e. cane, walker, etc), Requires the Assistance of Another Person for Safe Ambulation or to Leave the Home  Supporting Clincal Findings[de-identified] Limited Endurance, Fatigues Easliy in [employer]    DME Referral Provided  Referral made for DME?: Yes  DME referral completed for the following items[de-identified] Bedside Commode, Hospital Bed, [address] [address]  DME Supplier Name[de-identified] [employer]    Other Referral/Resources/Interventions Provided:  Interventions: DME, Other (Specify) (Visit Nurse)    Would you like to participate in our [address] service program?  : No - Declined    Treatment Team Recommendation: Short Term Rehab (family refusing STR)  Discharge Destination Plan[de-identified] Home with [address] ([facility])  Transport at Discharge : Family           ETA of Transport (Date): 08/17/23  ETA of Transport (Time): 1800        Accompanied by: Family member              Additional Comments: Family received all equiptment and will  pt approx 6pm.

## 2023-08-17 NOTE — ASSESSMENT & PLAN NOTE
Wt Readings from Last 3 Encounters:   08/16/23 (!) 142 kg (312 lb 9.8 oz)   08/05/23 (!) 154 kg (339 lb 8.1 oz)   07/19/23 (!) 140 kg (309 lb 9.6 oz)     Patient takes 20 torsemide daily at home. Echo 6/2/2023 - LVEF 65% Mildly increased wall thickness. Mild concentric hypertrophy. ECG 8/11 showed sinus rhythm with first degree AV block  Dry weight appears to be around 140 kg based on weight at cardiologist on 7/19.   Trace pedal edema on exam.      Plan:  • Change IV lasix to Torsemide from am

## 2023-08-17 NOTE — DISCHARGE SUMMARY
Discharging Physician / Practitioner: Bj Pacheco MD  PCP: Russell Grossman MD  Admission Date:   Admission Orders (From admission, onward)     Ordered        08/11/23 2136  Inpatient Admission  Once                      Discharge Date: 08/17/23     Principal discharge diagnoses:  1. Encephalopathy of unclear etiology  2. Ventilator dependent respiratory failure - resolved  3. Stage 2 pressure injury of right and left buttock - present on arrival  4. Hyponatremia  5. Dysphagia    Secondary diagnoses:  1. Depression  2. Chronic diastolic heart failure  3. Hypertension  4. Left testicular seminoma    Consultations During Hospital Stay:  Neurology  Medical oncology  Wound care  Behavioral health    Procedures Performed:   1.  CT head without contrast - No acute intracranial abnormality. Stable small bilateral subcortical hypoattenuating foci. No associated mass effect. 2.  CT chest/abdomen/pelvis with contrast - Limited inspiratory volume. Interval improvement in bibasilar atelectatic changes. Hepatic steatosis. Stable borderline left para-aortic adenopathy. 3.  MRI brain with and without contrast - No acute intracranial abnormality.    Stable nonspecific enhancement along the anterior floor of the third ventricle/hypothalamus when comparing to the March 15, 2023 study. Stable cerebral white matter signal abnormality, presumably the sequela of chronic microangiopathic disease given the patient's history. 4.  Chest x-ray - Persistent linear density at the left base unchanged since July 28, 2023 suggesting chronic atelectasis or scarring. No new infiltrates are seen. 5.  Continuous video EEG - no epileptiform discharges or seizures       Test Results Pending at Discharge (will require follow up):    Autoimmune and paraneoplastic CSF studies     Outpatient Tests Requested:  BMP in 5 to 7 days  Sleep study    Hospital Course:   Grant Reagan is a 41 y.o. male patient who [facility]. He was initially admitted to [facility] but ICU was called due to low GCS and he was intubated for airway protection. His mental status improved in the morning of 8/13/2023 and he was extubated. CSF studies have been negative so far. He improved clinically with high-dose intravenous thiamine at 500 mg 3 times daily and hence his encephalopathy was felt to be likely Wernicke's. Video EEG showed no evidence of seizure. No psychogenic cause was noted. He has a history of seminoma of left testes status postorchiectomy and chemotherapy which was held in May 2023 due to toxicity. Paraneoplastic and autoimmune CSF studies are pending at the time of discharge. Neurology will contact him for follow-up. He received 5 days of thiamine at 500 mg 3 times daily and was discharged on thiamine 100 mg daily. He was discharged home to his sister's house with visiting nurses, physical, occupational and speech therapy. Condition at Discharge: stable    Discharge Day Visit / Exam:   Subjective:    Vitals: Blood Pressure: 121/84 (08/17/23 1501)  Pulse: 73 (08/17/23 1501)  Temperature: 98 °F (36.7 °C) (08/17/23 1501)  Temp Source: Oral (08/16/23 1510)  Respirations: 17 (08/17/23 0727)  Weight - Scale: (!) 142 kg (313 lb 15 oz) (08/17/23 0600)  SpO2: 96 % (08/17/23 1501)  Exam:   Physical Exam  Vitals reviewed. HENT:      Head: Normocephalic. Nose: Nose normal.      Mouth/Throat:      Mouth: Mucous membranes are moist.   Eyes:      Extraocular Movements: Extraocular movements intact. Cardiovascular:      Rate and Rhythm: Normal rate and regular rhythm. Pulmonary:      Effort: Pulmonary effort is normal. No respiratory distress. Breath sounds: Normal breath sounds. No wheezing. Abdominal:      General: Bowel sounds are normal. There is no distension. Palpations: Abdomen is soft. Tenderness: There is no abdominal tenderness. Musculoskeletal:         General: No swelling. Cervical back: Neck supple. Skin:     General: Skin is warm and dry. Neurological:      General: No focal deficit present. Mental Status: He is alert. Discussion with Family: Updated  (mother) via phone. Discharge instructions/Information to patient and family:   See after visit summary for information provided to patient and family. Provisions for Follow-Up Care:  See after visit summary for information related to follow-up care and any pertinent home health orders. Disposition:   Home with VNA Services (Reminder: Complete face to face encounter)    Planned Readmission: No     Discharge Statement:  I spent 40 minutes discharging the patient. This time was spent on the day of discharge. I had direct contact with the patient on the day of discharge. Greater than 50% of the total time was spent examining patient, answering all patient questions, arranging and discussing plan of care with patient as well as directly providing post-discharge instructions. Additional time then spent on discharge activities. Discharge Medications:  See after visit summary for reconciled discharge medications provided to patient and/or family.       **Please Note: This note may have been constructed using a voice recognition system**

## 2023-08-17 NOTE — PLAN OF CARE
Problem: COPING  Goal: Pt/Family able to verbalize concerns and demonstrate effective coping strategies  Description: INTERVENTIONS:  - Assist patient/family to identify coping skills, available support systems and cultural and spiritual values  - Provide emotional support, including active listening and acknowledgement of concerns of patient and caregivers  - Reduce environmental stimuli, as able  - Provide patient education  - Assess for spiritual pain/suffering and initiate spiritual care, including notification of Pastoral Care or melody based community as needed  - Assess effectiveness of coping strategies  Outcome: Progressing  Goal: Will report anxiety at manageable levels  Description: INTERVENTIONS:  - Administer medication as ordered  - Teach and encourage coping skills  - Provide emotional support  - Assess patient/family for anxiety and ability to cope  Outcome: Progressing     Problem: NEUROSENSORY - ADULT  Goal: Achieves stable or improved neurological status  Description: INTERVENTIONS  - Monitor and report changes in neurological status  - Monitor vital signs such as temperature, blood pressure, glucose, and any other labs ordered   - Initiate measures to prevent increased intracranial pressure  - Monitor for seizure activity and implement precautions if appropriate      Outcome: Progressing  Goal: Achieves maximal functionality and self care  Description: INTERVENTIONS  - Monitor swallowing and airway patency with patient fatigue and changes in neurological status  - Encourage and assist patient to increase activity and self care.    - Encourage visually impaired, hearing impaired and aphasic patients to use assistive/communication devices  Outcome: Progressing

## 2023-08-17 NOTE — DISCHARGE INSTR - AVS FIRST PAGE
Get blood work done to check your kidney function and electrolytes (BMP) in 5-7 days (get script from your primary care physician)    Results of CSF studies are pending at the time of discharge.  Please discuss results with your primary care physician and neurologist

## 2023-08-17 NOTE — TELEPHONE ENCOUNTER
Geovanna Pascual([facility])  Pt admitted with encephalopathy (not related to vestibular schwanomma). Please reschedule their outpatient follow up for another 2-4 weeks with the mri brain IAC (yes, they just got an MRI done but will still need the MRI brain IACs done prior to their next follow up). Thanks.     Spoke with Albert Powers (Significant Other)   [phone] and she is aware of appt being cancelled and patient will need repeat MRI Brain with f/u and will call our office back at a later time to reschedule   Janna matson with detail information for central scheduling and our office number

## 2023-08-17 NOTE — ASSESSMENT & PLAN NOTE
Hx of HTN.  Only on torsemide 20 mg daily at home and has been controlled while inpatient  Was on Lasix 20 mg IV twice daily -  restart torsemide from 8/17

## 2023-08-17 NOTE — ASSESSMENT & PLAN NOTE
· Discussed with psychiatry -continue fluoxetine 10 mg daily  · Hold Seroquel indefinitely as no evidence of bipolar disorder

## 2023-08-17 NOTE — PLAN OF CARE
Problem: Potential for Falls  Goal: Patient will remain free of falls  Description: INTERVENTIONS:  - Educate patient/family on patient safety including physical limitations  - Instruct patient to call for assistance with activity   - Consult OT/PT to assist with strengthening/mobility   - Keep Call bell within reach  - Keep bed low and locked with side rails adjusted as appropriate  - Keep care items and personal belongings within reach  - Initiate and maintain comfort rounds  - Make Fall Risk Sign visible to staff  - Offer Toileting every  Hours, in advance of need  - Initiate/Maintain alarm  - Obtain necessary fall risk management equipment:  - Apply yellow socks and bracelet for high fall risk patients  - Consider moving patient to room near nurses station  Outcome: Progressing     Problem: MOBILITY - ADULT  Goal: Maintain or return to baseline ADL function  Description: INTERVENTIONS:  -  Assess patient's ability to carry out ADLs; assess patient's baseline for ADL function and identify physical deficits which impact ability to perform ADLs (bathing, care of mouth/teeth, toileting, grooming, dressing, etc.)  - Assess/evaluate cause of self-care deficits   - Assess range of motion  - Assess patient's mobility; develop plan if impaired  - Assess patient's need for assistive devices and provide as appropriate  - Encourage maximum independence but intervene and supervise when necessary  - Involve family in performance of ADLs  - Assess for home care needs following discharge   - Consider OT consult to assist with ADL evaluation and planning for discharge  - Provide patient education as appropriate  Outcome: Progressing  Goal: Maintains/Returns to pre admission functional level  Description: INTERVENTIONS:  - Perform BMAT or MOVE assessment daily.   - Set and communicate daily mobility goal to care team and patient/family/caregiver.    - Collaborate with rehabilitation services on mobility goals if consulted  - Perform Range of Motion  times a day. - Reposition patient every  hours.   - Dangle patient  times a day  - Stand patient  times a day  - Ambulate patient times a day  - Out of bed to chair  times a day   - Out of bed for meals  times a day  - Out of bed for toileting  - Record patient progress and toleration of activity level   Outcome: Progressing     Problem: Prexisting or High Potential for Compromised Skin Integrity  Goal: Skin integrity is maintained or improved  Description: INTERVENTIONS:  - Identify patients at risk for skin breakdown  - Assess and monitor skin integrity  - Assess and monitor nutrition and hydration status  - Monitor labs   - Assess for incontinence   - Turn and reposition patient  - Assist with mobility/ambulation  - Relieve pressure over bony prominences  - Avoid friction and shearing  - Provide appropriate hygiene as needed including keeping skin clean and dry  - Evaluate need for skin moisturizer/barrier cream  - Collaborate with interdisciplinary team   - Patient/family teaching  - Consider wound care consult   Outcome: Progressing     Problem: PAIN - ADULT  Goal: Verbalizes/displays adequate comfort level or baseline comfort level  Description: Interventions:  - Encourage patient to monitor pain and request assistance  - Assess pain using appropriate pain scale  - Administer analgesics based on type and severity of pain and evaluate response  - Implement non-pharmacological measures as appropriate and evaluate response  - Consider cultural and social influences on pain and pain management  - Notify physician/advanced practitioner if interventions unsuccessful or patient reports new pain  Outcome: Progressing     Problem: INFECTION - ADULT  Goal: Absence or prevention of progression during hospitalization  Description: INTERVENTIONS:  - Assess and monitor for signs and symptoms of infection  - Monitor lab/diagnostic results  - Monitor all insertion sites, i.e. indwelling lines, tubes, and drains  - Monitor endotracheal if appropriate and nasal secretions for changes in amount and color  - [place] appropriate cooling/warming therapies per order  - Administer medications as ordered  - Instruct and encourage patient and family to use good hand hygiene technique  - Identify and instruct in appropriate isolation precautions for identified infection/condition  Outcome: Progressing     Problem: SAFETY ADULT  Goal: Patient will remain free of falls  Description: INTERVENTIONS:  - Educate patient/family on patient safety including physical limitations  - Instruct patient to call for assistance with activity   - Consult OT/PT to assist with strengthening/mobility   - Keep Call bell within reach  - Keep bed low and locked with side rails adjusted as appropriate  - Keep care items and personal belongings within reach  - Initiate and maintain comfort rounds  - Make Fall Risk Sign visible to staff  - Offer Toileting every  Hours, in advance of need  - Initiate/Maintain alarm  - Obtain necessary fall risk management equipment:   - Apply yellow socks and bracelet for high fall risk patients  - Consider moving patient to room near nurses station  Outcome: Progressing  Goal: Maintain or return to baseline ADL function  Description: INTERVENTIONS:  -  Assess patient's ability to carry out ADLs; assess patient's baseline for ADL function and identify physical deficits which impact ability to perform ADLs (bathing, care of mouth/teeth, toileting, grooming, dressing, etc.)  - Assess/evaluate cause of self-care deficits   - Assess range of motion  - Assess patient's mobility; develop plan if impaired  - Assess patient's need for assistive devices and provide as appropriate  - Encourage maximum independence but intervene and supervise when necessary  - Involve family in performance of ADLs  - Assess for home care needs following discharge   - Consider OT consult to assist with ADL evaluation and planning for discharge  - Provide patient education as appropriate  Outcome: Progressing  Goal: Maintains/Returns to pre admission functional level  Description: INTERVENTIONS:  - Perform BMAT or MOVE assessment daily.   - Set and communicate daily mobility goal to care team and patient/family/caregiver. - Collaborate with rehabilitation services on mobility goals if consulted  - Perform Range of Motion  times a day. - Reposition patient every  hours. - Dangle patient  times a day  - Stand patient  times a day  - Ambulate patient  times a day  - Out of bed to chair  times a day   - Out of bed for meals  times a day  - Out of bed for toileting  - Record patient progress and toleration of activity level   Outcome: Progressing     Problem: DISCHARGE PLANNING  Goal: Discharge to home or other facility with appropriate resources  Description: INTERVENTIONS:  - Identify barriers to discharge w/patient and caregiver  - Arrange for needed discharge resources and transportation as appropriate  - Identify discharge learning needs (meds, wound care, etc.)  - Arrange for interpretive services to assist at discharge as needed  - Refer to Case Management Department for coordinating discharge planning if the patient needs post-hospital services based on physician/advanced practitioner order or complex needs related to functional status, cognitive ability, or social support system  Outcome: Progressing     Problem: Knowledge Deficit  Goal: Patient/family/caregiver demonstrates understanding of disease process, treatment plan, medications, and discharge instructions  Description: Complete learning assessment and assess knowledge base.   Interventions:  - Provide teaching at level of understanding  - Provide teaching via preferred learning methods  Outcome: Progressing     Problem: Nutrition/Hydration-ADULT  Goal: Nutrient/Hydration intake appropriate for improving, restoring or maintaining nutritional needs  Description: Monitor and assess patient's nutrition/hydration status for malnutrition. Collaborate with interdisciplinary team and initiate plan and interventions as ordered. Monitor patient's weight and dietary intake as ordered or per policy. Utilize nutrition screening tool and intervene as necessary. Determine patient's food preferences and provide high-protein, high-caloric foods as appropriate.      INTERVENTIONS:  - Monitor oral intake, urinary output, labs, and treatment plans  - Assess nutrition and hydration status and recommend course of action  - Evaluate amount of meals eaten  - Assist patient with eating if necessary   - Allow adequate time for meals  - Recommend/ encourage appropriate diets, oral nutritional supplements, and vitamin/mineral supplements  - Order, calculate, and assess calorie counts as needed  - Recommend, monitor, and adjust tube feedings and TPN/PPN based on assessed needs  - Assess need for intravenous fluids  - Provide specific nutrition/hydration education as appropriate  - Include patient/family/caregiver in decisions related to nutrition  Outcome: Progressing     Problem: COPING  Goal: Pt/Family able to verbalize concerns and demonstrate effective coping strategies  Description: INTERVENTIONS:  - Assist patient/family to identify coping skills, available support systems and cultural and spiritual values  - Provide emotional support, including active listening and acknowledgement of concerns of patient and caregivers  - Reduce environmental stimuli, as able  - Provide patient education  - Assess for spiritual pain/suffering and initiate spiritual care, including notification of Pastoral Care or melody based community as needed  - Assess effectiveness of coping strategies  Outcome: Progressing  Goal: Will report anxiety at manageable levels  Description: INTERVENTIONS:  - Administer medication as ordered  - Teach and encourage coping skills  - Provide emotional support  - Assess patient/family for anxiety and ability to cope  Outcome: Progressing     Problem: NEUROSENSORY - ADULT  Goal: Achieves stable or improved neurological status  Description: INTERVENTIONS  - Monitor and report changes in neurological status  - Monitor vital signs such as temperature, blood pressure, glucose, and any other labs ordered   - Initiate measures to prevent increased intracranial pressure  - Monitor for seizure activity and implement precautions if appropriate      Outcome: Progressing  Goal: Achieves maximal functionality and self care  Description: INTERVENTIONS  - Monitor swallowing and airway patency with patient fatigue and changes in neurological status  - Encourage and assist patient to increase activity and self care.    - Encourage visually impaired, hearing impaired and aphasic patients to use assistive/communication devices  Outcome: Progressing     Problem: CARDIOVASCULAR - ADULT  Goal: Maintains optimal cardiac output and hemodynamic stability  Description: INTERVENTIONS:  - Monitor I/O, vital signs and rhythm  - Monitor for S/S and trends of decreased cardiac output  - Administer and titrate ordered vasoactive medications to optimize hemodynamic stability  - Assess quality of pulses, skin color and temperature  - Assess for signs of decreased coronary artery perfusion  - Instruct patient to report change in severity of symptoms  Outcome: Progressing  Goal: Absence of cardiac dysrhythmias or at baseline rhythm  Description: INTERVENTIONS:  - Continuous cardiac monitoring, vital signs, obtain 12 lead EKG if ordered  - Administer antiarrhythmic and heart rate control medications as ordered  - Monitor electrolytes and administer replacement therapy as ordered  Outcome: Progressing     Problem: RESPIRATORY - ADULT  Goal: Achieves optimal ventilation and oxygenation  Description: INTERVENTIONS:  - Assess for changes in respiratory status  - Assess for changes in mentation and behavior  - Position to facilitate oxygenation and minimize respiratory effort  - Oxygen administered by appropriate delivery if ordered  - Initiate smoking cessation education as indicated  - Encourage broncho-pulmonary hygiene including cough, deep breathe, Incentive Spirometry  - Assess the need for suctioning and aspirate as needed  - Assess and instruct to report SOB or any respiratory difficulty  - Respiratory Therapy support as indicated  Outcome: Progressing     Problem: GASTROINTESTINAL - ADULT  Goal: Maintains or returns to baseline bowel function  Description: INTERVENTIONS:  - Assess bowel function  - Encourage oral fluids to ensure adequate hydration  - Administer IV fluids if ordered to ensure adequate hydration  - Administer ordered medications as needed  - Encourage mobilization and activity  - Consider nutritional services referral to assist patient with adequate nutrition and appropriate food choices  Outcome: Progressing  Goal: Maintains adequate nutritional intake  Description: INTERVENTIONS:  - Monitor percentage of each meal consumed  - Identify factors contributing to decreased intake, treat as appropriate  - Assist with meals as needed  - Monitor I&O, weight, and lab values if indicated  - Obtain nutrition services referral as needed  Outcome: Progressing     Problem: GENITOURINARY - ADULT  Goal: Maintains or returns to baseline urinary function  Description: INTERVENTIONS:  - Assess urinary function  - Encourage oral fluids to ensure adequate hydration if ordered  - Administer IV fluids as ordered to ensure adequate hydration  - Administer ordered medications as needed  - Offer frequent toileting  - Follow urinary retention protocol if ordered  Outcome: Progressing  Goal: Absence of urinary retention  Description: INTERVENTIONS:  - Assess patient’s ability to void and empty bladder  - Monitor I/O  - Bladder scan as needed  - Discuss with physician/AP medications to alleviate retention as needed  - Discuss catheterization for long term situations as appropriate  Outcome: Progressing  Goal: Urinary catheter remains patent  Description: INTERVENTIONS:  - Assess patency of urinary catheter  - If patient has a chronic puga, consider changing catheter if non-functioning  - Follow guidelines for intermittent irrigation of non-functioning urinary catheter  Outcome: Progressing     Problem: METABOLIC, FLUID AND ELECTROLYTES - ADULT  Goal: Electrolytes maintained within normal limits  Description: INTERVENTIONS:  - Monitor labs and assess patient for signs and symptoms of electrolyte imbalances  - Administer electrolyte replacement as ordered  - Monitor response to electrolyte replacements, including repeat lab results as appropriate  - Instruct patient on fluid and nutrition as appropriate  Outcome: Progressing  Goal: Fluid balance maintained  Description: INTERVENTIONS:  - Monitor labs   - Monitor I/O and WT  - Instruct patient on fluid and nutrition as appropriate  - Assess for signs & symptoms of volume excess or deficit  Outcome: Progressing     Problem: SKIN/TISSUE INTEGRITY - ADULT  Goal: Skin Integrity remains intact(Skin Breakdown Prevention)  Description: Assess:  -Perform Adria assessment every   -Clean and moisturize skin every   -Inspect skin when repositioning, toileting, and assisting with ADLS  -Assess under medical devices such as every   -Assess extremities for adequate circulation and sensation     Bed Management:  -Have minimal linens on bed & keep smooth, unwrinkled  -Change linens as needed when moist or perspiring  -Avoid sitting or lying in one position for more than  hours while in bed  -Keep HOB at degrees     Toileting:  -Offer bedside commode  -Assess for incontinence every   -Use incontinent care products after each incontinent episode such as     Activity:  -Mobilize patient times a day  -Encourage activity and walks on unit  -Encourage or provide ROM exercises   -Turn and reposition patient every  Hours  -Use appropriate equipment to lift or move patient in bed  -Instruct/ Assist with weight shifting every when out of bed in chair  -Consider limitation of chair time  hour intervals    Skin Care:  -Avoid use of baby powder, tape, friction and shearing, hot water or constrictive clothing  -Relieve pressure over bony prominences using   -Do not massage red bony areas    Next Steps:  -Teach patient strategies to minimize risks such as   -Consider consults to  interdisciplinary teams such as   Outcome: Progressing  Goal: Incision(s), wounds(s) or drain site(s) healing without S/S of infection  Description: INTERVENTIONS  - Assess and document dressing, incision, wound bed, drain sites and surrounding tissue  - Provide patient and family education  - Perform skin care/dressing changes every   Outcome: Progressing  Goal: Pressure injury heals and does not worsen  Description: Interventions:  - Implement low air loss mattress or specialty surface (Criteria met)  - Apply silicone foam dressing  - Instruct/assist with weight shifting every  minutes when in chair   - Limit chair time to  hour intervals  - Use special pressure reducing interventions such as  when in chair   - Apply fecal or urinary incontinence containment device   - Perform passive or active ROM every   - Turn and reposition patient & offload bony prominences every hours   - Utilize friction reducing device or surface for transfers   - Consider consults to  interdisciplinary teams such as   - Use incontinent care products after each incontinent episode such as   - Consider nutrition services referral as needed  Outcome: Progressing     Problem: HEMATOLOGIC - ADULT  Goal: Maintains hematologic stability  Description: INTERVENTIONS  - Assess for signs and symptoms of bleeding or hemorrhage  - Monitor labs  - Administer supportive blood products/factors as ordered and appropriate  Outcome: Progressing     Problem: MUSCULOSKELETAL - ADULT  Goal: Maintain or return mobility to safest level of function  Description: INTERVENTIONS:  - Assess patient's ability to carry out ADLs; assess patient's baseline for ADL function and identify physical deficits which impact ability to perform ADLs (bathing, care of mouth/teeth, toileting, grooming, dressing, etc.)  - Assess/evaluate cause of self-care deficits   - Assess range of motion  - Assess patient's mobility  - Assess patient's need for assistive devices and provide as appropriate  - Encourage maximum independence but intervene and supervise when necessary  - Involve family in performance of ADLs  - Assess for home care needs following discharge   - Consider OT consult to assist with ADL evaluation and planning for discharge  - Provide patient education as appropriate  Outcome: Progressing

## 2023-08-17 NOTE — RESTORATIVE TECHNICIAN NOTE
Restorative Technician Note      Patient Name: Cynthia Mejia     Note Type: Mobility  Patient Position Upon Consult: Supine  Activity Performed: Ambulated; Dangled; Stood  Assistive Device: Other (Comment) (Assist x1 with chair follow.)  Education Provided: Yes  Patient Position at End of Consult: Supine;  All needs within reach; Bed/Chair alarm activated    Delaney PENG, Restorative Technician, [employer]

## 2023-08-18 ENCOUNTER — HOME CARE VISIT (OUTPATIENT)
Dept: HOME HEALTH SERVICES | Facility: HOME HEALTHCARE | Age: 38
End: 2023-08-18

## 2023-08-18 ENCOUNTER — TRANSITIONAL CARE MANAGEMENT (OUTPATIENT)
Dept: FAMILY MEDICINE CLINIC | Facility: CLINIC | Age: 38
End: 2023-08-18

## 2023-08-18 LAB
B BURGDOR IGG CSF IA-ACNC: < 0.08 INDEX
B BURGDOR IGM CSF IA-ACNC: < 0.06 INDEX

## 2023-08-18 NOTE — UTILIZATION REVIEW
NOTIFICATION OF ADMISSION DISCHARGE   This is a Notification of Discharge from [facility]. Please be advised that this patient has been discharge from our facility. Below you will find the admission and discharge date and time including the patient’s disposition. UTILIZATION REVIEW CONTACT:  Sincere Hardy  Utilization   Network Utilization Review Department  Phone: [phone] x carefully listen to the prompts. All voicemails are confidential.  Email: [email or web address]     ADMISSION INFORMATION  PRESENTATION DATE: 8/11/2023  4:13 PM  OBERVATION ADMISSION DATE  INPATIENT ADMISSION DATE: 8/11/23  9:36 PM   DISCHARGE DATE: 8/17/2023 10:20 PM   DISPOSITION:Home with Home Health Care    IMPORTANT INFORMATION:  Send all requests for admission clinical reviews, approved or denied determinations and any other requests to dedicated fax number below belonging to the campus where the patient is receiving treatment.  List of dedicated fax numbers:  [place] DENIALS (Administrative/Medical Necessity) [phone] [address] [facility] (Maternity/NICU/Pediatrics) [phone]   [facility] [phone]   [facility] [phone] [address] [facility] [phone]   [address] [place] [address] [phone]   [facility] [phone]   [address] [place] [address] [address] [facility] [phone]   [address] [place] [address] [phone]   [address] [facility] [phone]   [address] [facility] [address] [facility] [phone]

## 2023-08-19 ENCOUNTER — HOME CARE VISIT (OUTPATIENT)
Dept: HOME HEALTH SERVICES | Facility: HOME HEALTHCARE | Age: 38
End: 2023-08-19

## 2023-08-20 ENCOUNTER — HOME CARE VISIT (OUTPATIENT)
Dept: HOME HEALTH SERVICES | Facility: HOME HEALTHCARE | Age: 38
End: 2023-08-20
Payer: COMMERCIAL

## 2023-08-20 VITALS
HEIGHT: 76 IN | BODY MASS INDEX: 38.11 KG/M2 | OXYGEN SATURATION: 98 % | HEART RATE: 87 BPM | SYSTOLIC BLOOD PRESSURE: 118 MMHG | DIASTOLIC BLOOD PRESSURE: 70 MMHG | TEMPERATURE: 98.4 F | RESPIRATION RATE: 18 BRPM | WEIGHT: 313 LBS

## 2023-08-20 PROCEDURE — 400013 VN SOC

## 2023-08-20 PROCEDURE — G0299 HHS/HOSPICE OF RN EA 15 MIN: HCPCS

## 2023-08-20 RX ORDER — TORSEMIDE 20 MG/1
20 TABLET ORAL DAILY
Qty: 30 TABLET | Refills: 0 | Status: ON HOLD | OUTPATIENT
Start: 2023-08-20

## 2023-08-20 RX ORDER — POTASSIUM CHLORIDE 750 MG/1
10 TABLET, EXTENDED RELEASE ORAL DAILY
Qty: 30 TABLET | Refills: 0 | Status: ON HOLD | OUTPATIENT
Start: 2023-08-20

## 2023-08-20 RX ORDER — FLUOXETINE 10 MG/1
10 CAPSULE ORAL DAILY
Qty: 30 CAPSULE | Refills: 0 | Status: ON HOLD | OUTPATIENT
Start: 2023-08-20

## 2023-08-20 RX ORDER — ALBUTEROL SULFATE 90 UG/1
2 AEROSOL, METERED RESPIRATORY (INHALATION) EVERY 4 HOURS PRN
Qty: 6.7 G | Refills: 0 | Status: ON HOLD | OUTPATIENT
Start: 2023-08-20

## 2023-08-20 NOTE — CASE COMMUNICATION
[facility] has admitted your patient to [facility] service with the following disciplines:      SN, PT, OT and MSW  This report is informational only, no responses is needed  Primary focus of home health care- Cardiac  Patient stated goals of care-  Improve CHF symptoms and resume previous activity. Anticipated visit pattern and next visit date- Next SN visit for 8/23 2w3 1w3  See medication list - meds in home differ from AVS  S ignificant clinical findings- Pt missing Potassium, Torsemide, Fluxetine, and albuterol at [facility]. SN called pharmacy; only medication avaliable for pt to  was Potassium. TT sent to [facility]. MD sent over scripts [facility] in [facility] during visit. Sister made aware and she will be picking up later today. Potential barriers to goal achievement- Needs assitance abulating and with daily care. Other pertinent information : MSW referrel for assistance long term care. Thank you for allowing us to participate in the care of your patient.       [facility] Colon RN

## 2023-08-20 NOTE — CASE COMMUNICATION
Ship toPt. Home     Branch: [employer] [facility]     Wound 1 buttocks   Full    All items are ordered by the each unless otherwise noted.   PLEASE DO NOT ORDER BY THE BOX  Request specific quantity needed  For Private Insurances order should be for a 2 week period  For [facility] patients order should be for a 1 week period      Nas Tran [id number]  Gauze 4x4 N/S 200 4x4s per unit  [id number]  Alginate 4x4 [id number]   Sacral [id number]

## 2023-08-21 ENCOUNTER — HOME CARE VISIT (OUTPATIENT)
Dept: HOME HEALTH SERVICES | Facility: HOME HEALTHCARE | Age: 38
End: 2023-08-21
Payer: COMMERCIAL

## 2023-08-21 LAB — FUNGUS SPEC CULT: NORMAL

## 2023-08-22 ENCOUNTER — HOME CARE VISIT (OUTPATIENT)
Dept: HOME HEALTH SERVICES | Facility: HOME HEALTHCARE | Age: 38
End: 2023-08-22
Payer: COMMERCIAL

## 2023-08-22 PROCEDURE — G0155 HHCP-SVS OF CSW,EA 15 MIN: HCPCS

## 2023-08-23 ENCOUNTER — HOME CARE VISIT (OUTPATIENT)
Dept: HOME HEALTH SERVICES | Facility: HOME HEALTHCARE | Age: 38
End: 2023-08-23
Payer: COMMERCIAL

## 2023-08-23 ENCOUNTER — APPOINTMENT (EMERGENCY)
Dept: RADIOLOGY | Facility: HOSPITAL | Age: 38
End: 2023-08-23
Payer: COMMERCIAL

## 2023-08-23 ENCOUNTER — APPOINTMENT (EMERGENCY)
Dept: CT IMAGING | Facility: HOSPITAL | Age: 38
End: 2023-08-23
Payer: COMMERCIAL

## 2023-08-23 ENCOUNTER — HOSPITAL ENCOUNTER (INPATIENT)
Facility: HOSPITAL | Age: 38
LOS: 1 days | Discharge: ANOTHER HOSPITAL | End: 2023-08-24
Attending: INTERNAL MEDICINE | Admitting: INTERNAL MEDICINE
Payer: COMMERCIAL

## 2023-08-23 VITALS
SYSTOLIC BLOOD PRESSURE: 130 MMHG | OXYGEN SATURATION: 94 % | DIASTOLIC BLOOD PRESSURE: 76 MMHG | RESPIRATION RATE: 18 BRPM | TEMPERATURE: 98.2 F | HEART RATE: 77 BPM

## 2023-08-23 DIAGNOSIS — E87.1 HYPONATREMIA: ICD-10-CM

## 2023-08-23 DIAGNOSIS — R06.89 HYPERCAPNIA: ICD-10-CM

## 2023-08-23 DIAGNOSIS — E87.6 HYPOKALEMIA: ICD-10-CM

## 2023-08-23 DIAGNOSIS — I26.93 SINGLE SUBSEGMENTAL PULMONARY EMBOLISM WITHOUT ACUTE COR PULMONALE (HCC): ICD-10-CM

## 2023-08-23 DIAGNOSIS — G93.40 ENCEPHALOPATHY: ICD-10-CM

## 2023-08-23 DIAGNOSIS — R09.02 HYPOXIA: Primary | ICD-10-CM

## 2023-08-23 PROBLEM — I26.99 ACUTE PULMONARY EMBOLISM WITHOUT ACUTE COR PULMONALE (HCC): Status: ACTIVE | Noted: 2023-08-23

## 2023-08-23 PROBLEM — Z86.39 HISTORY OF WERNICKE'S ENCEPHALOPATHY: Status: ACTIVE | Noted: 2023-08-23

## 2023-08-23 LAB
ALBUMIN SERPL BCP-MCNC: 4.2 G/DL (ref 3.5–5)
ALP SERPL-CCNC: 123 U/L (ref 34–104)
ALT SERPL W P-5'-P-CCNC: 73 U/L (ref 7–52)
ANION GAP SERPL CALCULATED.3IONS-SCNC: 5 MMOL/L
ANION GAP SERPL CALCULATED.3IONS-SCNC: 7 MMOL/L
ANION GAP SERPL CALCULATED.3IONS-SCNC: 9 MMOL/L
APTT PPP: 28 SECONDS (ref 23–37)
ARTERIAL PATENCY WRIST A: YES
AST SERPL W P-5'-P-CCNC: 29 U/L (ref 13–39)
ATRIAL RATE: 86 BPM
BASE EXCESS BLDA CALC-SCNC: 8.3 MMOL/L
BASOPHILS # BLD AUTO: 0.05 THOUSANDS/ÂΜL (ref 0–0.1)
BASOPHILS NFR BLD AUTO: 1 % (ref 0–1)
BILIRUB SERPL-MCNC: 0.52 MG/DL (ref 0.2–1)
BNP SERPL-MCNC: 9 PG/ML (ref 0–100)
BODY TEMPERATURE: 98.4 DEGREES FEHRENHEIT
BUN SERPL-MCNC: 8 MG/DL (ref 5–25)
BUN SERPL-MCNC: 8 MG/DL (ref 5–25)
BUN SERPL-MCNC: 9 MG/DL (ref 5–25)
CALCIUM SERPL-MCNC: 8.1 MG/DL (ref 8.4–10.2)
CALCIUM SERPL-MCNC: 8.2 MG/DL (ref 8.4–10.2)
CALCIUM SERPL-MCNC: 9.1 MG/DL (ref 8.4–10.2)
CARDIAC TROPONIN I PNL SERPL HS: 9 NG/L (ref 8–18)
CHLORIDE SERPL-SCNC: 78 MMOL/L (ref 96–108)
CHLORIDE SERPL-SCNC: 80 MMOL/L (ref 96–108)
CHLORIDE SERPL-SCNC: 81 MMOL/L (ref 96–108)
CO2 SERPL-SCNC: 37 MMOL/L (ref 21–32)
CO2 SERPL-SCNC: 38 MMOL/L (ref 21–32)
CO2 SERPL-SCNC: 41 MMOL/L (ref 21–32)
CREAT SERPL-MCNC: 0.75 MG/DL (ref 0.6–1.3)
CREAT SERPL-MCNC: 0.8 MG/DL (ref 0.6–1.3)
CREAT SERPL-MCNC: 0.92 MG/DL (ref 0.6–1.3)
D DIMER PPP FEU-MCNC: 1.11 UG/ML FEU
EOSINOPHIL # BLD AUTO: 0.04 THOUSAND/ÂΜL (ref 0–0.61)
EOSINOPHIL NFR BLD AUTO: 0 % (ref 0–6)
ERYTHROCYTE [DISTWIDTH] IN BLOOD BY AUTOMATED COUNT: 14 % (ref 11.6–15.1)
GFR SERPL CREATININE-BSD FRML MDRD: 106 ML/MIN/1.73SQ M
GFR SERPL CREATININE-BSD FRML MDRD: 114 ML/MIN/1.73SQ M
GFR SERPL CREATININE-BSD FRML MDRD: 118 ML/MIN/1.73SQ M
GLUCOSE SERPL-MCNC: 109 MG/DL (ref 65–140)
GLUCOSE SERPL-MCNC: 111 MG/DL (ref 65–140)
GLUCOSE SERPL-MCNC: 130 MG/DL (ref 65–140)
HCO3 BLDA-SCNC: 35.8 MMOL/L (ref 22–28)
HCT VFR BLD AUTO: 41.6 % (ref 36.5–49.3)
HGB BLD-MCNC: 14.3 G/DL (ref 12–17)
IMM GRANULOCYTES # BLD AUTO: 0.05 THOUSAND/UL (ref 0–0.2)
IMM GRANULOCYTES NFR BLD AUTO: 1 % (ref 0–2)
INR PPP: 0.96 (ref 0.84–1.19)
LACTATE SERPL-SCNC: 1.9 MMOL/L (ref 0.5–2)
LYMPHOCYTES # BLD AUTO: 1.29 THOUSANDS/ÂΜL (ref 0.6–4.47)
LYMPHOCYTES NFR BLD AUTO: 13 % (ref 14–44)
MAGNESIUM SERPL-MCNC: 2.3 MG/DL (ref 1.9–2.7)
MCH RBC QN AUTO: 31.7 PG (ref 26.8–34.3)
MCHC RBC AUTO-ENTMCNC: 34.4 G/DL (ref 31.4–37.4)
MCV RBC AUTO: 92 FL (ref 82–98)
MONOCYTES # BLD AUTO: 0.73 THOUSAND/ÂΜL (ref 0.17–1.22)
MONOCYTES NFR BLD AUTO: 7 % (ref 4–12)
NASAL CANNULA: 3
NEUTROPHILS # BLD AUTO: 7.95 THOUSANDS/ÂΜL (ref 1.85–7.62)
NEUTS SEG NFR BLD AUTO: 78 % (ref 43–75)
NRBC BLD AUTO-RTO: 0 /100 WBCS
O2 CT BLDA-SCNC: 19.2 ML/DL (ref 16–23)
OXYHGB MFR BLDA: 92.1 % (ref 94–97)
P AXIS: 48 DEGREES
PCO2 BLDA: 61.9 MM HG (ref 36–44)
PCO2 TEMP ADJ BLDA: 61.6 MM HG (ref 36–44)
PH BLD: 7.38 [PH] (ref 7.35–7.45)
PH BLDA: 7.38 [PH] (ref 7.35–7.45)
PLATELET # BLD AUTO: 358 THOUSANDS/UL (ref 149–390)
PMV BLD AUTO: 8.5 FL (ref 8.9–12.7)
PO2 BLD: 71.2 MM HG (ref 75–129)
PO2 BLDA: 71.7 MM HG (ref 75–129)
POTASSIUM SERPL-SCNC: 2.8 MMOL/L (ref 3.5–5.3)
POTASSIUM SERPL-SCNC: 3.5 MMOL/L (ref 3.5–5.3)
POTASSIUM SERPL-SCNC: 3.5 MMOL/L (ref 3.5–5.3)
PR INTERVAL: 176 MS
PROT SERPL-MCNC: 7.5 G/DL (ref 6.4–8.4)
PROTHROMBIN TIME: 13.1 SECONDS (ref 11.6–14.5)
QRS AXIS: 26 DEGREES
QRSD INTERVAL: 96 MS
QT INTERVAL: 356 MS
QTC INTERVAL: 426 MS
RBC # BLD AUTO: 4.51 MILLION/UL (ref 3.88–5.62)
SODIUM SERPL-SCNC: 124 MMOL/L (ref 135–147)
SODIUM SERPL-SCNC: 126 MMOL/L (ref 135–147)
SODIUM SERPL-SCNC: 126 MMOL/L (ref 135–147)
SPECIMEN SOURCE: ABNORMAL
T WAVE AXIS: 8 DEGREES
VENTRICULAR RATE: 86 BPM
WBC # BLD AUTO: 10.11 THOUSAND/UL (ref 4.31–10.16)

## 2023-08-23 PROCEDURE — 80053 COMPREHEN METABOLIC PANEL: CPT | Performed by: INTERNAL MEDICINE

## 2023-08-23 PROCEDURE — 96365 THER/PROPH/DIAG IV INF INIT: CPT

## 2023-08-23 PROCEDURE — 93010 ELECTROCARDIOGRAM REPORT: CPT | Performed by: INTERNAL MEDICINE

## 2023-08-23 PROCEDURE — 84300 ASSAY OF URINE SODIUM: CPT | Performed by: STUDENT IN AN ORGANIZED HEALTH CARE EDUCATION/TRAINING PROGRAM

## 2023-08-23 PROCEDURE — 82805 BLOOD GASES W/O2 SATURATION: CPT | Performed by: INTERNAL MEDICINE

## 2023-08-23 PROCEDURE — G0299 HHS/HOSPICE OF RN EA 15 MIN: HCPCS

## 2023-08-23 PROCEDURE — 85025 COMPLETE CBC W/AUTO DIFF WBC: CPT | Performed by: INTERNAL MEDICINE

## 2023-08-23 PROCEDURE — 83935 ASSAY OF URINE OSMOLALITY: CPT | Performed by: STUDENT IN AN ORGANIZED HEALTH CARE EDUCATION/TRAINING PROGRAM

## 2023-08-23 PROCEDURE — 82088 ASSAY OF ALDOSTERONE: CPT | Performed by: STUDENT IN AN ORGANIZED HEALTH CARE EDUCATION/TRAINING PROGRAM

## 2023-08-23 PROCEDURE — 84484 ASSAY OF TROPONIN QUANT: CPT | Performed by: INTERNAL MEDICINE

## 2023-08-23 PROCEDURE — G1004 CDSM NDSC: HCPCS

## 2023-08-23 PROCEDURE — 71045 X-RAY EXAM CHEST 1 VIEW: CPT

## 2023-08-23 PROCEDURE — 85610 PROTHROMBIN TIME: CPT | Performed by: INTERNAL MEDICINE

## 2023-08-23 PROCEDURE — 71275 CT ANGIOGRAPHY CHEST: CPT

## 2023-08-23 PROCEDURE — 80048 BASIC METABOLIC PNL TOTAL CA: CPT | Performed by: INTERNAL MEDICINE

## 2023-08-23 PROCEDURE — 83735 ASSAY OF MAGNESIUM: CPT | Performed by: STUDENT IN AN ORGANIZED HEALTH CARE EDUCATION/TRAINING PROGRAM

## 2023-08-23 PROCEDURE — 85730 THROMBOPLASTIN TIME PARTIAL: CPT | Performed by: INTERNAL MEDICINE

## 2023-08-23 PROCEDURE — 83930 ASSAY OF BLOOD OSMOLALITY: CPT | Performed by: STUDENT IN AN ORGANIZED HEALTH CARE EDUCATION/TRAINING PROGRAM

## 2023-08-23 PROCEDURE — 99291 CRITICAL CARE FIRST HOUR: CPT | Performed by: INTERNAL MEDICINE

## 2023-08-23 PROCEDURE — 36415 COLL VENOUS BLD VENIPUNCTURE: CPT | Performed by: INTERNAL MEDICINE

## 2023-08-23 PROCEDURE — 80048 BASIC METABOLIC PNL TOTAL CA: CPT | Performed by: STUDENT IN AN ORGANIZED HEALTH CARE EDUCATION/TRAINING PROGRAM

## 2023-08-23 PROCEDURE — 83880 ASSAY OF NATRIURETIC PEPTIDE: CPT | Performed by: INTERNAL MEDICINE

## 2023-08-23 PROCEDURE — 93005 ELECTROCARDIOGRAM TRACING: CPT

## 2023-08-23 PROCEDURE — 36600 WITHDRAWAL OF ARTERIAL BLOOD: CPT

## 2023-08-23 PROCEDURE — 85379 FIBRIN DEGRADATION QUANT: CPT | Performed by: INTERNAL MEDICINE

## 2023-08-23 PROCEDURE — 84244 ASSAY OF RENIN: CPT | Performed by: STUDENT IN AN ORGANIZED HEALTH CARE EDUCATION/TRAINING PROGRAM

## 2023-08-23 PROCEDURE — 99285 EMERGENCY DEPT VISIT HI MDM: CPT

## 2023-08-23 PROCEDURE — 99223 1ST HOSP IP/OBS HIGH 75: CPT | Performed by: INTERNAL MEDICINE

## 2023-08-23 PROCEDURE — 87040 BLOOD CULTURE FOR BACTERIA: CPT | Performed by: INTERNAL MEDICINE

## 2023-08-23 PROCEDURE — 83605 ASSAY OF LACTIC ACID: CPT | Performed by: INTERNAL MEDICINE

## 2023-08-23 RX ORDER — SODIUM CHLORIDE 9 MG/ML
125 INJECTION, SOLUTION INTRAVENOUS CONTINUOUS
Status: DISCONTINUED | OUTPATIENT
Start: 2023-08-23 | End: 2023-08-24

## 2023-08-23 RX ORDER — POTASSIUM CHLORIDE 20 MEQ/1
40 TABLET, EXTENDED RELEASE ORAL ONCE
Status: COMPLETED | OUTPATIENT
Start: 2023-08-23 | End: 2023-08-23

## 2023-08-23 RX ORDER — HEPARIN SODIUM 1000 [USP'U]/ML
10000 INJECTION, SOLUTION INTRAVENOUS; SUBCUTANEOUS ONCE
Status: COMPLETED | OUTPATIENT
Start: 2023-08-23 | End: 2023-08-23

## 2023-08-23 RX ORDER — HEPARIN SODIUM 1000 [USP'U]/ML
5000 INJECTION, SOLUTION INTRAVENOUS; SUBCUTANEOUS EVERY 6 HOURS PRN
Status: DISCONTINUED | OUTPATIENT
Start: 2023-08-23 | End: 2023-08-24 | Stop reason: HOSPADM

## 2023-08-23 RX ORDER — HEPARIN SODIUM 1000 [USP'U]/ML
10000 INJECTION, SOLUTION INTRAVENOUS; SUBCUTANEOUS EVERY 6 HOURS PRN
Status: DISCONTINUED | OUTPATIENT
Start: 2023-08-23 | End: 2023-08-24 | Stop reason: HOSPADM

## 2023-08-23 RX ORDER — POTASSIUM CHLORIDE 14.9 MG/ML
20 INJECTION INTRAVENOUS ONCE
Status: COMPLETED | OUTPATIENT
Start: 2023-08-23 | End: 2023-08-23

## 2023-08-23 RX ORDER — FLUOXETINE 10 MG/1
10 CAPSULE ORAL DAILY
Status: DISCONTINUED | OUTPATIENT
Start: 2023-08-24 | End: 2023-08-24 | Stop reason: HOSPADM

## 2023-08-23 RX ORDER — MAGNESIUM SULFATE HEPTAHYDRATE 40 MG/ML
2 INJECTION, SOLUTION INTRAVENOUS ONCE
Status: COMPLETED | OUTPATIENT
Start: 2023-08-23 | End: 2023-08-24

## 2023-08-23 RX ORDER — LANOLIN ALCOHOL/MO/W.PET/CERES
100 CREAM (GRAM) TOPICAL DAILY
Status: DISCONTINUED | OUTPATIENT
Start: 2023-08-24 | End: 2023-08-24

## 2023-08-23 RX ORDER — HEPARIN SODIUM 10000 [USP'U]/100ML
3-30 INJECTION, SOLUTION INTRAVENOUS
Status: DISCONTINUED | OUTPATIENT
Start: 2023-08-23 | End: 2023-08-24 | Stop reason: HOSPADM

## 2023-08-23 RX ADMIN — POTASSIUM CHLORIDE 40 MEQ: 1500 TABLET, EXTENDED RELEASE ORAL at 15:45

## 2023-08-23 RX ADMIN — SODIUM CHLORIDE 125 ML/HR: 0.9 INJECTION, SOLUTION INTRAVENOUS at 16:37

## 2023-08-23 RX ADMIN — IOHEXOL 100 ML: 350 INJECTION, SOLUTION INTRAVENOUS at 16:06

## 2023-08-23 RX ADMIN — POTASSIUM CHLORIDE 20 MEQ: 14.9 INJECTION, SOLUTION INTRAVENOUS at 16:32

## 2023-08-23 RX ADMIN — HEPARIN SODIUM 18 UNITS/KG/HR: 10000 INJECTION, SOLUTION INTRAVENOUS at 18:23

## 2023-08-23 RX ADMIN — MAGNESIUM SULFATE HEPTAHYDRATE 2 G: 40 INJECTION, SOLUTION INTRAVENOUS at 20:31

## 2023-08-23 RX ADMIN — HEPARIN SODIUM 10000 UNITS: 1000 INJECTION INTRAVENOUS; SUBCUTANEOUS at 18:26

## 2023-08-23 NOTE — H&P
[facility]  H&P  Name: Glen Harman 41 y.o. male I MRN: [id number]  Unit/Bed#: MS Jessica I Date of Admission: 8/23/2023   Date of Service: 8/24/2023 I Hospital Day: 1      Assessment/Plan   * Acute pulmonary embolism without acute cor pulmonale (HCC)  Assessment & Plan  CTA PE study notable for right lower lobe segmental pulmonary artery embolism  RV to LV ratio less than 0.9, no right heart strain  Hemodynamically stable  RF: obesity, sedentary, hx of cancer   · Continue NC O2 for gpo2>90%  · Continue heparin drip  · Transition to DOAC vs warfarin tomorrow   · Continue AC for at least 3 months     Hyponatremia  Assessment & Plan  · Per family, limiting salt intake 2/2 doctors recommendations due to "heart problems"  · TTE from June'22 reviewed: EF 64% normal systolic and diastolic dysfunction, mild LV concentric hypertrophy   · Patient maintained on demadex 20 qd for chronic LE edema   · Na 124 on admission   · S/p 1 L NS and 60 mEq K   · Per chart review previously diagnosed as SIADH + 2/2 diuretic use   · Check sodium studies   · Goal Na 130-132 over the next 24 hours  · Repeat BMP q8h  · Hold diuretic   · Trial fluid restriction     Hypokalemia  Assessment & Plan  K 2.8 POA   · Patient has a history of hypokalemia, chronically on kdur 10 mEq qd   · Suspect worsening hypokalemia 2/2 poor po intake and diuretic use   · caution w/ supplemental potassium given concomitant hyponatremia   · BMP q8h  · Also w.  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 + aunt + mother via phone call    Anticipated Length of Stay: Patient will be admitted on an inpatient basis with an anticipated length of stay of greater than 2 midnights secondary to PE. Total Time Spent on Date of Encounter in care of patient: 40 minutes This time was spent on one or more of the following: performing physical exam; counseling and coordination of care; obtaining or reviewing history; documenting in the medical record; reviewing/ordering tests, medications or procedures; communicating with other healthcare professionals and discussing with patient's family/caregivers. Chief Complaint: SOB    History of Present Illness:  Enrique Felton is a 41 y.o. male with a PMH of testicular cancer s/p chemo resection, TBI, Warnicke's encephalopathy, partially blindness who presents with with SOB and hypoxia. Patient had a visiting nurse home visit today and pulse ox was noted to be in the 80s. On arrival to the ED patient was noted to be hypoxic and tachypneic however other vital signs were stable. .  CT PE study demonstrated right lower lobe pulmonary embolism without evidence of right heart strain. Patient was admitted for electrolyte abnormalities as well as acute PE. He was started heparin drip at the time of admission. Patient is status post 1 L normal saline as well as 60 mEq of potassium. Examined at bedside, patient is able to nod his head and answer questions appropriately however largely nonverbal.  Family present at bedside from which most of the history is obtained. Prior to admission patient's family had been giving him a low-salt diet due to concerns about his heart. Denied any fevers, chills. No current alcohol tobacco or drug use. Patient largely sedentary however ambulates with a walker about 1-2 times a day.     History is notable for recent admission to [place] [facility] for increased somnolence requiring intubation for airway protection, status post extubation 8/13/2023. Video EEG negative for seizure activities. Etiology was felt to be Warnicke and patient was discharged on thiamine. Review of Systems:  Review of Systems   Constitutional: Negative for chills and fever. HENT: Negative for ear pain and sore throat. Eyes: Negative for pain and visual disturbance. Respiratory: Positive for shortness of breath. Negative for cough. Cardiovascular: Negative for chest pain and palpitations. Gastrointestinal: Negative for abdominal pain and vomiting. Genitourinary: Negative for dysuria and hematuria. Musculoskeletal: Negative for arthralgias and back pain. Skin: Negative for color change and rash. Neurological: Negative for seizures and syncope. All other systems reviewed and are negative. Past Medical and Surgical History:   Past Medical History:   Diagnosis Date   • Anxiety    • Cancer [facility])    • Depression    • Psychiatric disorder     bipolar       Past Surgical History:   Procedure Laterality Date   • IR BIOPSY LYMPH NODE  1/20/2023   • IR PORT PLACEMENT  3/14/2023   • ORCHIECTOMY Left 12/14/2022    Procedure: ORCHIECTOMY INGUINAL;  Surgeon: Therese Madera MD;  Location: [facility];  Service: Urology       Meds/Allergies:  Prior to Admission medications    Medication Sig Start Date End Date Taking?  Authorizing Provider   albuterol (PROVENTIL HFA,VENTOLIN HFA) 90 mcg/act inhaler Inhale 2 puffs every 4 (four) hours as needed for wheezing 8/20/23   Malachi Flores MD   FLUoxetine (PROzac) 10 mg capsule Take 1 capsule (10 mg total) by mouth daily 8/20/23   Malachi Flores MD   potassium chloride (K-DUR,KLOR-CON) 10 mEq tablet Take 1 tablet (10 mEq total) by mouth daily 8/20/23   Malachi Flores MD   thiamine 100 MG tablet Take 1 tablet (100 mg total) by mouth daily 8/17/23   Malachi Flores MD   torsemide [facility]) 20 mg tablet Take 1 tablet (20 mg total) by mouth daily 8/20/23   Talisha Herman MD     I have reviewed home medications with patient family member. Allergies: No Known Allergies    Social History:  Marital Status: Single   Occupation: Not currently working  Patient Pre-hospital Living Situation: Home -lives with a sister  Patient Pre-hospital Level of Mobility: walks with walker  Patient Pre-hospital Diet Restrictions: None  Substance Use History:   Social History     Substance and Sexual Activity   Alcohol Use Not Currently    Comment: 2 cases of beer daily, stopped 3 years ago     Social History     Tobacco Use   Smoking Status Former   • Packs/day: 0.50   • Years: 5.00   • Total pack years: 2.50   • Types: Cigarettes   • Start date: 1/1/2008   Smokeless Tobacco Never     Social History     Substance and Sexual Activity   Drug Use Yes   • Types: Marijuana    Comment: Medical marijuana       Family History:  Family History   Problem Relation Age of Onset   • Coronary artery disease Maternal Aunt        Physical Exam:     Vitals:   Blood Pressure: 150/69 (08/24/23 0323)  Pulse: 73 (08/24/23 0323)  Temperature: (!) 97.2 °F (36.2 °C) (08/24/23 0323)  Temp Source: Axillary (08/24/23 0323)  Respirations: 18 (08/24/23 0323)  Height: 6' 4" (193 cm) (08/23/23 1918)  Weight - Scale: (!) 141 kg (309 lb 15.5 oz) (08/23/23 1918)  SpO2: 97 % (08/24/23 0323)    Physical Exam  Vitals and nursing note reviewed. Constitutional:       General: He is not in acute distress. Appearance: He is well-developed. He is obese. HENT:      Head: Normocephalic and atraumatic. Eyes:      Conjunctiva/sclera: Conjunctivae normal.   Cardiovascular:      Rate and Rhythm: Normal rate and regular rhythm. Heart sounds: No murmur heard. Pulmonary:      Effort: Pulmonary effort is normal. No respiratory distress. Breath sounds: Normal breath sounds. Abdominal:      Palpations: Abdomen is soft. Tenderness:  There is no abdominal tenderness. Musculoskeletal:         General: No swelling. Cervical back: Neck supple. Right lower leg: Edema present. Left lower leg: Edema present. Skin:     General: Skin is warm and dry. Capillary Refill: Capillary refill takes less than 2 seconds. Neurological:      Mental Status: He is alert. Comments: Unable to assess orienation   Psychiatric:         Mood and Affect: Mood normal.          Additional Data:     Lab Results:  Results from last 7 days   Lab Units 08/23/23  1435   WBC Thousand/uL 10.11   HEMOGLOBIN g/dL 14.3   HEMATOCRIT % 41.6   PLATELETS Thousands/uL 358   NEUTROS PCT % 78*   LYMPHS PCT % 13*   MONOS PCT % 7   EOS PCT % 0     Results from last 7 days   Lab Units 08/24/23  0553 08/23/23  2312 08/23/23  1435   SODIUM mmol/L 127*   < > 124*   POTASSIUM mmol/L 3.6   < > 2.8*   CHLORIDE mmol/L 83*   < > 78*   CO2 mmol/L 42*   < > 37*   BUN mg/dL 7   < > 9   CREATININE mg/dL 0.62   < > 0.92   ANION GAP mmol/L 2   < > 9   CALCIUM mg/dL 8.4   < > 9.1   ALBUMIN g/dL  --   --  4.2   TOTAL BILIRUBIN mg/dL  --   --  0.52   ALK PHOS U/L  --   --  123*   ALT U/L  --   --  73*   AST U/L  --   --  29   GLUCOSE RANDOM mg/dL 102   < > 130    < > = values in this interval not displayed. Results from last 7 days   Lab Units 08/23/23  1435   INR  0.96     Results from last 7 days   Lab Units 08/17/23  0825   POC GLUCOSE mg/dl 108         Results from last 7 days   Lab Units 08/23/23  1444   LACTIC ACID mmol/L 1.9       Lines/Drains:  Invasive Devices     Central Venous Catheter Line  Duration           Port A Cath 03/14/23 Right Internal jugular 162 days          Peripheral Intravenous Line  Duration           Long-Dwell Peripheral IV (Midline) 08/23/23 Left Brachial <1 day    Peripheral IV 08/23/23 Right Antecubital <1 day                Central Line:  Goal for removal: Port accessed. Will de-access as appropriate.            Imaging: Reviewed radiology reports from this admission including: chest xray and chest CT scan  CTA ED chest PE Study   Final Result by Coretta Xavier MD (08/23 1463)      Pulmonary embolism in the right lower lobe segmental pulmonary artery. Measured RV/LV ratio is within normal limits at less than 0.9. Hepatic steatosis. I personally discussed this study with Juan Osborne on 8/23/2023 4:40 PM.            Workstation performed: [id number]         XR chest portable   ED Interpretation by Juan Osborne MD (08/23 0056)   CXR; no acute infiltrate; L linear base opacification , bilateral basal atelectasis          EKG and Other Studies Reviewed on Admission:   · EKG: NSR. HR 86, sinus arrhythmia. ** Please Note: This note has been constructed using a voice recognition system.  **

## 2023-08-23 NOTE — Clinical Note
SN arrived at pt home and pt was still at his social security appointment from this morning but was on his way back. SN had an extensive conversation with his family with the following signficant clinical findings:   Pt currently on torsemide and family states that he is heavily incontinent and just urinates everywhere. They state that pt is unable to use a urinal as he cannot open his legs wide enough and that 'he has an extremely small penis' which makes it difficult to place in a urinal. SN recommended female urinal as it has a wider opening. Pt does have a bedside commode but family states it's too small for him and when he urinates 'It sprays through through the opening between the seat and the bucket. They also endorse that the patient has no idea when he has to urinate and that he is 'soaked in urine when he wakes up'. This is causing him to have MASD at the top of his right buttock. There is no mention of incontinence from what SN can see in most recent inpatient admission and previous outpatient records. Pt arrived home and required two people with walker to transfer from [place] to inside his residence. Family member stated that patient kept falling asleep at [facility] office and could barely sign paperwork. Pt somnolent throughout visit and did not engage with SN. Pt would alternate between pursed lip with belly breathing w/ RR at 20/min and 83% O2 sat on RA to resting comfortably and satting at 94-96%. Lung sounds decreased in all fields but extremities are well perfused with no cyanosis present. Pt is not on home oxygen. SN inquired about hospital course and family stated that 'None of the doctors knew what was wrong and didn't help us. He's been this way since being discharged and they don't care."     Pt then perked up and began to interact with family when offered a [employer] cheeseburger.  Pt has been diagnosed with dysphagia and SLP had pt on dysphagia 2 mechanical soft w/ thin liquids per recent progress note on 8/17 and pt also to follow a cardiac diet with 1800ml FR - however pt actively eating a cheeseburger at [facility] visit. Concern for aspiration 2/2 somnolence as pt falls asleep easily and pt not following ordered diet. Family endoreses monitoring him during all of his meals and states that he is 'Always wide awake when he is eating.'    Because of these events SN instructed pt be re-evaluated in the ED however family did not want to go because they have an appointment with Dr. Jarod Mejia tomorrow and that she is the only one who helps them. Again instructed family on importance of going to the ED to which they again declined. Reached out to Dr. Jarod Mejia via TT expressing main concern for hypoxia and family refusal to go to the ED. She stated that patient should go to the ED and that he should have been sent home with oxygen. She also relayed that pt family can ask for a patient advocate and/or  to refuse discharge if they feel he is too sick to do so.  educated family extensively on patient rights while they are admitted.      Family agreed to take patient to the ED but only if Dr. Servin [facility] office called EMS as they felt a physician may have more influence on having 'the doctors take them seriously in the ED.' At time of this note pt is currently in the ED

## 2023-08-23 NOTE — ASSESSMENT & PLAN NOTE
CTA PE study notable for right lower lobe segmental pulmonary artery embolism  RV to LV ratio less than 0.9, no right heart strain  Hemodynamically stable  RF: obesity, sedentary, hx of cancer   · Continue NC O2 for gpo2>90%  · Continue heparin drip  · Transition to DOAC vs warfarin tomorrow   · Continue AC for at least 3 months

## 2023-08-23 NOTE — ASSESSMENT & PLAN NOTE
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

## 2023-08-23 NOTE — ASSESSMENT & PLAN NOTE
· Per family, limiting salt intake 2/2 doctors recommendations due to "heart problems"  · TTE from June'22 reviewed: EF 49% normal systolic and diastolic dysfunction, mild LV concentric hypertrophy   · Patient maintained on demadex 20 qd for chronic LE edema   · Na 124 on admission   · S/p 1 L NS and 60 mEq K   · Suspect hyponatremia 2/2 low po intake and diuretic   · Check sodium studies   · Goal Na 130-132 over the next 24 hours  · Repeat BMP q8h  · Hold diuretic

## 2023-08-23 NOTE — ASSESSMENT & PLAN NOTE
K 2. 8 POA   · Patient has a history of hypokalemia, chronically on kdur 10 mEq qd   · Suspect worsening hypokalemia 2/2 poor po intake and diuretic use   · caution w/ supplemental potassium given concomitant hyponatremia   · BMP q8h  · Also w.  History of HTN, check shanta/renin ratio  · Hold diuretic

## 2023-08-23 NOTE — ED PROVIDER NOTES
History  Chief Complaint   Patient presents with   • Shortness of Breath     Pt presents to the ed via ems after his home health nurse repors his o2 to be in the low 80s, nurse was told to send pt here by pcp, hx of TBI      This is a 41years old was brought from home as he is visiting and he was found to his pulse ox was low. It was on the 80s. Called 911 and the ambulance brought him to the ER giving him some oxygen. On the arrival to the ER his pulse ox on the room air is 88. Patient was giving oxygen NC 3L/min, and this comes up to 94%. Has history of TBI, and he is partially blind. Patient is nonverbal.  Patient was admitted recently to [facility] on 8/11/2023 for increased somnolence, at that time patient was intubated admitted to ICU patient eventually extubated on 8/13/23. Patient also has EEG video monitoring which shows no seizure activities. Patient during his admission was receiving thiamine 500 mg 3 times daily and was discharged home with thiamine 100 mg once daily. Patient at the ER has no fever but he is tachypneic and respiratory rate 22/min. Vital signs other than this are stable. No other history is available as family members are not here. 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 Prior to Admission Medications   Prescriptions Last Dose Informant Patient Reported? Taking?    FLUoxetine (PROzac) 10 mg capsule Unknown  No No   Sig: Take 1 capsule (10 mg total) by mouth daily   albuterol (PROVENTIL HFA,VENTOLIN HFA) 90 mcg/act inhaler Unknown  No No   Sig: Inhale 2 puffs every 4 (four) hours as needed for wheezing   potassium chloride (K-DUR,KLOR-CON) 10 mEq tablet Unknown  No No   Sig: Take 1 tablet (10 mEq total) by mouth daily   thiamine 100 MG tablet Unknown  No No   Sig: Take 1 tablet (100 mg total) by mouth daily   torsemide (DEMADEX) 20 mg tablet Unknown  No No   Sig: Take 1 tablet (20 mg total) by mouth daily      Facility-Administered Medications: None       Past Medical History:   Diagnosis Date   • Anxiety    • Cancer ([address])    • Depression    • Psychiatric disorder     bipolar       Past Surgical History:   Procedure Laterality Date   • IR BIOPSY LYMPH NODE  1/20/2023   • IR PORT PLACEMENT  3/14/2023   • ORCHIECTOMY Left 12/14/2022    Procedure: ORCHIECTOMY INGUINAL;  Surgeon: Carly Duncan MD;  Location: [facility];  Service: Urology       Family History   Problem Relation Age of Onset   • Coronary artery disease Maternal Aunt      I have reviewed and agree with the history as documented. E-Cigarette/Vaping   • E-Cigarette Use Never User      E-Cigarette/Vaping Substances   • Nicotine No    • THC Yes    • CBD No    • Flavoring No    • Other No    • Unknown No      Social History     Tobacco Use   • Smoking status: Former     Packs/day: 0.50     Years: 5.00     Total pack years: 2.50     Types: Cigarettes     Start date: 1/1/2008   • Smokeless tobacco: Never   Vaping Use   • Vaping Use: Never used   Substance Use Topics   • Alcohol use: Not Currently     Comment: 2 cases of beer daily, stopped 3 years ago   • Drug use: Yes     Types: Marijuana     Comment: Medical marijuana       Review of Systems   Unable to perform ROS: Other     TBI, None verbal.   Physical Exam  Physical Exam  Vitals and nursing note reviewed. Constitutional:       General: He is not in acute distress. Appearance: He is well-developed. He is obese. He is not ill-appearing, toxic-appearing or diaphoretic. Interventions: He is not intubated. HENT:      Head: Normocephalic and atraumatic. Mouth/Throat:      Mouth: Mucous membranes are moist.      Pharynx: No pharyngeal swelling or oropharyngeal exudate. Cardiovascular:      Rate and Rhythm: Normal rate and regular rhythm. Heart sounds: Normal heart sounds. No murmur heard.      No friction rub. No gallop. Pulmonary:      Effort: Pulmonary effort is normal. No accessory muscle usage or respiratory distress. He is not intubated. Breath sounds: Normal breath sounds. No stridor. No decreased breath sounds, wheezing, rhonchi or rales. Chest:      Chest wall: No mass, deformity, tenderness or edema. There is no dullness to percussion. Abdominal:      General: Bowel sounds are normal. There is no distension. Palpations: Abdomen is soft. There is no mass. Tenderness: There is no abdominal tenderness. There is no guarding. Musculoskeletal:         General: No tenderness or deformity. Normal range of motion. Cervical back: Normal range of motion and neck supple. Right lower leg: No tenderness. No edema. Left lower leg: No tenderness. No edema. Skin:     General: Skin is warm and dry. Coloration: Skin is not cyanotic or pale. Findings: No ecchymosis, erythema or rash. Nails: There is no clubbing. Neurological:      Mental Status: He is alert.    Psychiatric:         Behavior: Behavior normal.         Vital Signs  ED Triage Vitals [08/23/23 1415]   Temperature Pulse Respirations Blood Pressure SpO2   98.4 °F (36.9 °C) 98 22 131/67 92 %      Temp Source Heart Rate Source Patient Position - Orthostatic VS BP Location FiO2 (%)   Oral Monitor Lying Left arm --      Pain Score       --           Vitals:    08/23/23 1918 08/23/23 2339 08/24/23 0323 08/24/23 0742   BP: 105/60 126/60 150/69 124/72   Pulse: 73 69 73 71   Patient Position - Orthostatic VS: Lying Lying Lying Lying         Visual Acuity      ED Medications  Medications   heparin (porcine) 25,000 units in 0.45% NaCl 250 mL infusion (premix) (15 Units/kg/hr × 125 kg (Order-Specific) Intravenous New Bag 8/24/23 0553)   heparin (porcine) injection 10,000 Units (has no administration in time range)   heparin (porcine) injection 5,000 Units (has no administration in time range)   FLUoxetine (PROzac) capsule 10 mg (10 mg Oral Not Given 8/24/23 0842)   thiamine (VITAMIN B1) 100 mg in sodium chloride 0.9 % 50 mL IVPB (has no administration in time range)   sodium chloride 0.9 % infusion (50 mL/hr Intravenous New Bag 8/24/23 0919)   potassium chloride (K-DUR,KLOR-CON) CR tablet 40 mEq (40 mEq Oral Given 8/23/23 1545)   potassium chloride 20 mEq IVPB (premix) (0 mEq Intravenous Stopped 8/23/23 1833)   iohexol (OMNIPAQUE) 350 MG/ML injection (SINGLE-DOSE) 100 mL (100 mL Intravenous Given 8/23/23 1606)   heparin (porcine) injection 10,000 Units (10,000 Units Intravenous Given 8/23/23 1826)   magnesium sulfate 2 g/50 mL IVPB (premix) 2 g (2 g Intravenous New Bag 8/23/23 2031)       Diagnostic Studies  Results Reviewed     Procedure Component Value Units Date/Time    BMP Q8 hours X 3 (Hyponatremia monitoring) [[id number]]  (Abnormal) Collected: 08/24/23 0834    Lab Status: Final result Specimen: Blood from Arm, Left Updated: 08/24/23 0906     Sodium 128 mmol/L      Potassium 3.7 mmol/L      Chloride 83 mmol/L      CO2 42 mmol/L      ANION GAP 3 mmol/L      BUN 7 mg/dL      Creatinine 0.64 mg/dL      Glucose 97 mg/dL      Calcium 8.5 mg/dL      eGFR 125 ml/min/1.73sq m     Narrative:      [facility] guidelines for Chronic Kidney Disease (CKD):   •  Stage 1 with normal or high GFR (GFR > 90 mL/min/1.73 square meters)  •  Stage 2 Mild CKD (GFR = 60-89 mL/min/1.73 square meters)  •  Stage 3A Moderate CKD (GFR = 45-59 mL/min/1.73 square meters)  •  Stage 3B Moderate CKD (GFR = 30-44 mL/min/1.73 square meters)  •  Stage 4 Severe CKD (GFR = 15-29 mL/min/1.73 square meters)  •  Stage 5 End Stage CKD (GFR <15 mL/min/1.73 square meters)  Note: GFR calculation is accurate only with a steady state creatinine    BMP Q8 hours X 3 (Hyponatremia monitoring) [[id number]]  (Abnormal) Collected: 08/24/23 0553    Lab Status: Final result Specimen: Blood from Arm, Left Updated: 08/24/23 5250     Sodium 127 mmol/L Potassium 3.6 mmol/L      Chloride 83 mmol/L      CO2 42 mmol/L      ANION GAP 2 mmol/L      BUN 7 mg/dL      Creatinine 0.62 mg/dL      Glucose 102 mg/dL      Calcium 8.4 mg/dL      eGFR 127 ml/min/1.73sq m     Narrative:      [facility] guidelines for Chronic Kidney Disease (CKD):   •  Stage 1 with normal or high GFR (GFR > 90 mL/min/1.73 square meters)  •  Stage 2 Mild CKD (GFR = 60-89 mL/min/1.73 square meters)  •  Stage 3A Moderate CKD (GFR = 45-59 mL/min/1.73 square meters)  •  Stage 3B Moderate CKD (GFR = 30-44 mL/min/1.73 square meters)  •  Stage 4 Severe CKD (GFR = 15-29 mL/min/1.73 square meters)  •  Stage 5 End Stage CKD (GFR <15 mL/min/1.73 square meters)  Note: GFR calculation is accurate only with a steady state creatinine    BMP Q8 hours X 3 (Hyponatremia monitoring) [[id number]]  (Abnormal) Collected: 08/23/23 2312    Lab Status: Final result Specimen: Blood from Arm, Right Updated: 08/23/23 2340     Sodium 126 mmol/L      Potassium 3.5 mmol/L      Chloride 81 mmol/L      CO2 38 mmol/L      ANION GAP 7 mmol/L      BUN 8 mg/dL      Creatinine 0.75 mg/dL      Glucose 109 mg/dL      Calcium 8.2 mg/dL      eGFR 118 ml/min/1.73sq m     Narrative:      [facility] guidelines for Chronic Kidney Disease (CKD):   •  Stage 1 with normal or high GFR (GFR > 90 mL/min/1.73 square meters)  •  Stage 2 Mild CKD (GFR = 60-89 mL/min/1.73 square meters)  •  Stage 3A Moderate CKD (GFR = 45-59 mL/min/1.73 square meters)  •  Stage 3B Moderate CKD (GFR = 30-44 mL/min/1.73 square meters)  •  Stage 4 Severe CKD (GFR = 15-29 mL/min/1.73 square meters)  •  Stage 5 End Stage CKD (GFR <15 mL/min/1.73 square meters)  Note: GFR calculation is accurate only with a steady state creatinine    Blood culture #1 [[id number]] Collected: 08/23/23 1444    Lab Status: Preliminary result Specimen: Blood from Arm, Left Updated: 08/23/23 2209     Blood Culture Received in Microbiology Lab.  Culture in Progress. Blood culture #2 [[id number]] Collected: 08/23/23 1435    Lab Status: Preliminary result Specimen: Blood from Arm, Right Updated: 08/23/23 2201     Blood Culture Received in Microbiology Lab. Culture in Progress.     APTT [[id number]]  (Normal) Collected: 08/23/23 1435    Lab Status: Final result Specimen: Blood Updated: 08/23/23 1716     PTT 28 seconds     Protime-INR [[id number]]  (Normal) Collected: 08/23/23 1435    Lab Status: Final result Specimen: Blood Updated: 08/23/23 1715     Protime 13.1 seconds      INR 0.96    Blood gas, arterial [[id number]]  (Abnormal) Collected: 08/23/23 1526    Lab Status: Final result Specimen: Blood, Arterial from Radial, Left Updated: 08/23/23 1612     pH, Arterial 7.380     PH ART TC 7.381     pCO2, Arterial 61.9 mm Hg      PCO2 (TC) Arterial 61.6 mm Hg      pO2, Arterial 71.7 mm Hg      PO2 (TC) Arterial 71.2 mm Hg      HCO3, Arterial 35.8 mmol/L      Base Excess, Arterial 8.3 mmol/L      O2 Content, Arterial 19.2 mL/dL      O2 HGB,Arterial  92.1 %      SOURCE Radial, Left     MIGUELANGEL TEST Yes     Temperature 98.4 Degrees Fehrenheit      Nasal Cannula 3    B-Type Natriuretic Peptide(BNP) [[id number]]  (Normal) Collected: 08/23/23 1435    Lab Status: Final result Specimen: Blood from Arm, Right Updated: 08/23/23 1535     BNP 9 pg/mL     High Sensitivity Troponin I Random [[id number]]  (Normal) Collected: 08/23/23 1435    Lab Status: Final result Specimen: Blood from Arm, Right Updated: 08/23/23 1510     HS TnI random 9 ng/L     D-dimer, quantitative [[id number]]  (Abnormal) Collected: 08/23/23 1435    Lab Status: Final result Specimen: Blood from Arm, Right Updated: 08/23/23 1509     D-Dimer, Quant 1.11 ug/ml FEU     Lactic acid, plasma (w/reflex if result > 2.0) [[id number]]  (Normal) Collected: 08/23/23 1444    Lab Status: Final result Specimen: Blood from Arm, Left Updated: 08/23/23 1509     LACTIC ACID 1.9 mmol/L     Narrative:      Result may be elevated if tourniquet was used during collection.     Comprehensive metabolic panel [[id number]]  (Abnormal) Collected: 08/23/23 1435    Lab Status: Final result Specimen: Blood from Arm, Right Updated: 08/23/23 1505     Sodium 124 mmol/L      Potassium 2.8 mmol/L      Chloride 78 mmol/L      CO2 37 mmol/L      ANION GAP 9 mmol/L      BUN 9 mg/dL      Creatinine 0.92 mg/dL      Glucose 130 mg/dL      Calcium 9.1 mg/dL      AST 29 U/L      ALT 73 U/L      Alkaline Phosphatase 123 U/L      Total Protein 7.5 g/dL      Albumin 4.2 g/dL      Total Bilirubin 0.52 mg/dL      eGFR 106 ml/min/1.73sq m     Narrative:      [employer] [facility] [employer] guidelines for Chronic Kidney Disease (CKD):   •  Stage 1 with normal or high GFR (GFR > 90 mL/min/1.73 square meters)  •  Stage 2 Mild CKD (GFR = 60-89 mL/min/1.73 square meters)  •  Stage 3A Moderate CKD (GFR = 45-59 mL/min/1.73 square meters)  •  Stage 3B Moderate CKD (GFR = 30-44 mL/min/1.73 square meters)  •  Stage 4 Severe CKD (GFR = 15-29 mL/min/1.73 square meters)  •  Stage 5 End Stage CKD (GFR <15 mL/min/1.73 square meters)  Note: GFR calculation is accurate only with a steady state creatinine    CBC and differential [[id number]]  (Abnormal) Collected: 08/23/23 1435    Lab Status: Final result Specimen: Blood from Arm, Right Updated: 08/23/23 1446     WBC 10.11 Thousand/uL      RBC 4.51 Million/uL      Hemoglobin 14.3 g/dL      Hematocrit 41.6 %      MCV 92 fL      MCH 31.7 pg      MCHC 34.4 g/dL      RDW 14.0 %      MPV 8.5 fL      Platelets 484 Thousands/uL      nRBC 0 /100 WBCs      Neutrophils Relative 78 %      Immat GRANS % 1 %      Lymphocytes Relative 13 %      Monocytes Relative 7 %      Eosinophils Relative 0 %      Basophils Relative 1 %      Neutrophils Absolute 7.95 Thousands/µL      Immature Grans Absolute 0.05 Thousand/uL      Lymphocytes Absolute 1.29 Thousands/µL      Monocytes Absolute 0.73 Thousand/µL      Eosinophils Absolute 0.04 Thousand/µL      Basophils Absolute 0.05 Thousands/µL                  CTA ED chest PE Study   Final Result by Ina Salvador MD (08/23 1543)      Pulmonary embolism in the right lower lobe segmental pulmonary artery. Measured RV/LV ratio is within normal limits at less than 0.9. Hepatic steatosis. I personally discussed this study with Cecilia Agudelo on 8/23/2023 4:40 PM.            Workstation performed: [id number]         XR chest portable   ED Interpretation by Cecilia Agudelo MD (08/23 3495)   CXR; no acute infiltrate; L linear base opacification , bilateral basal atelectasis      Final Result by Jase Rivas MD (08/24 6903)   Probable chronic atelectasis or scarring left lung base   Findings otherwise stable   No acute cardiopulmonary disease.                   Workstation performed: [id number]                    Procedures  ECG 12 Lead Documentation Only    Date/Time: 8/23/2023 3:08 PM    Performed by: Cecilia Agudelo MD  Authorized by: Cecilia Agudelo MD    Indications / Diagnosis:  Sob  ECG reviewed by me, the ED Provider: yes    Patient location:  ED and bedside  Interpretation:     Interpretation: abnormal    Rate:     ECG rate:  86    ECG rate assessment: normal    Rhythm:     Rhythm: sinus rhythm    Ectopy:     Ectopy: none    QRS:     QRS axis:  Normal    QRS intervals:  Normal  Conduction:     Conduction: normal    ST segments:     ST segments:  Non-specific  Comments:      Sinus arrhythemia    CriticalCare Time    Date/Time: 8/23/2023 4:53 PM    Performed by: Cecilia Agudelo MD  Authorized by: Cecilia Agudelo MD    Critical care provider statement:     Critical care time (minutes):  45    Critical care start time:  8/23/2023 2:15 PM    Critical care end time:  8/23/2023 4:53 PM    Critical care time was exclusive of:  Separately billable procedures and treating other patients    Critical care was necessary to treat or prevent imminent or life-threatening deterioration of the following conditions: hypoxemia, PE, electrolyte abnormalities     Critical care was time spent personally by me on the following activities:  Blood draw for specimens, discussions with consultants, discussions with primary provider, evaluation of patient's response to treatment, examination of patient, ordering and performing treatments and interventions, ordering and review of laboratory studies, ordering and review of radiographic studies, re-evaluation of patient's condition and review of old charts             ED Course                               SBIRT 20yo+    Flowsheet Row Most Recent Value   Initial Alcohol Screen: US AUDIT-C     1. How often do you have a drink containing alcohol? 0 Filed at: 08/23/2023 1440   2. How many drinks containing alcohol do you have on a typical day you are drinking? 0 Filed at: 08/23/2023 1440   3a. Male UNDER 65: How often do you have five or more drinks on one occasion? 0 Filed at: 08/23/2023 1440   3b. FEMALE Any Age, or MALE 65+: How often do you have 4 or more drinks on one occassion? 0 Filed at: 08/23/2023 1440   Audit-C Score 0 Filed at: 08/23/2023 1440   JEAN MARIE: How many times in the past year have you. .. Used an illegal drug or used a prescription medication for non-medical reasons? Never Filed at: 08/23/2023 1440                    Medical Decision Making  This is a 41years old with extensive medical problems as patient has history of encephalopathy, TBI, seminoma, CHF, hypertension. Patient was recently discharged from [facility] , on 8/17/2023. Patient found today he has low pulse ox in the 80s. Patient is nonverbal and partially blind so patient was sent to to ER giving O2 3L/min, goes up to 94. Physical exam shows patient was tachypneic. Patient will and was discharged from [facility] , he supposed to be discharged on oxygen but he never received it at home so far. No other pertinent findings.   Labs reviewed came back Na 124, K 2.8, ABG,  7.38, pCO2 61.9, pO2 71.7, O2 Sat 92% , this is on O2 3L NC. Lactic acid 1.9, CXR shows bilateral atelectasis. CTA; RLL subsegmental PE. RV/LV 0.9. Pt stated on heparin. Patient admitted for hypoxemia, PE, hyponatremia, hypokalemia. Family member at the ER Case discussed with them. Amount and/or Complexity of Data Reviewed  Labs: ordered. Details: Na. 124, K 2.8, pCO2 61.9. Lactic acid 1.9  Radiology: ordered and independent interpretation performed. Details: CTA; RLL subsegmental PE.   CXR; bilateral lower atelectasis   ECG/medicine tests: ordered and independent interpretation performed. Details: EKG; NSR rate 86/min sinus arrhythmias, nonspecific ST  abnormalities. Risk  Prescription drug management. Decision regarding hospitalization. Disposition  Final diagnoses:   Hypoxia   Single subsegmental pulmonary embolism without acute cor pulmonale (HCC)   Hypercapnia   Hypokalemia   Hyponatremia     Time reflects when diagnosis was documented in both MDM as applicable and the Disposition within this note     Time User Action Codes Description Comment    8/23/2023  4:51 PM Charo Stewart Add [R09.02] Hypoxia     8/23/2023  4:52 PM Radha Santamaria Add [I26.93] Single subsegmental pulmonary embolism without acute cor pulmonale ([address])     8/23/2023  4:52 PM Radha Santamaria Add [R06.89] Hypercapnia     8/23/2023  4:52 PM Radha Santamaria Add [E87.6] Hypokalemia     8/23/2023  4:53 PM Radha Santamaria Add [E87.1] Hyponatremia       ED Disposition     ED Disposition   Admit    Condition   Stable    Date/Time   Wed Aug 23, 2023  5:11 PM    Comment   Case was discussed with dr Kizzy Alexander and the patient's admission status was agreed to be admitted  to the service of Dr. Kizzy Alexander  .            Follow-up Information    None         Current Discharge Medication List      CONTINUE these medications which have NOT CHANGED    Details   albuterol (PROVENTIL HFA,VENTOLIN HFA) 90 mcg/act inhaler Inhale 2 puffs every 4 (four) hours as needed for wheezing  Qty: 6.7 g, Refills: 0    Comments: Substitution to a formulary equivalent within the same pharmaceutical class is authorized. Associated Diagnoses: Tobacco use      FLUoxetine (PROzac) 10 mg capsule Take 1 capsule (10 mg total) by mouth daily  Qty: 30 capsule, Refills: 0    Associated Diagnoses: Depression      potassium chloride (K-DUR,KLOR-CON) 10 mEq tablet Take 1 tablet (10 mEq total) by mouth daily  Qty: 30 tablet, Refills: 0    Associated Diagnoses: Acute on chronic heart failure with preserved ejection fraction (HCC)      thiamine 100 MG tablet Take 1 tablet (100 mg total) by mouth daily  Qty: 30 tablet, Refills: 0    Associated Diagnoses: Acute encephalopathy      torsemide (DEMADEX) 20 mg tablet Take 1 tablet (20 mg total) by mouth daily  Qty: 30 tablet, Refills: 0    Associated Diagnoses: Acute systolic congestive heart failure (HCC)             No discharge procedures on file.     PDMP Review     None          ED Provider  Electronically Signed by           Jeniffer Rojas MD  08/24/23 6784

## 2023-08-24 ENCOUNTER — HOME CARE VISIT (OUTPATIENT)
Dept: HOME HEALTH SERVICES | Facility: HOME HEALTHCARE | Age: 38
End: 2023-08-24
Payer: COMMERCIAL

## 2023-08-24 ENCOUNTER — APPOINTMENT (INPATIENT)
Dept: CT IMAGING | Facility: HOSPITAL | Age: 38
End: 2023-08-24
Payer: COMMERCIAL

## 2023-08-24 ENCOUNTER — APPOINTMENT (INPATIENT)
Dept: RADIOLOGY | Facility: HOSPITAL | Age: 38
End: 2023-08-24
Payer: COMMERCIAL

## 2023-08-24 ENCOUNTER — APPOINTMENT (OUTPATIENT)
Dept: RADIOLOGY | Facility: HOSPITAL | Age: 38
DRG: 005 | End: 2023-08-24
Payer: COMMERCIAL

## 2023-08-24 ENCOUNTER — HOSPITAL ENCOUNTER (INPATIENT)
Facility: HOSPITAL | Age: 38
LOS: 30 days | Discharge: HOME HEALTH | DRG: 005 | End: 2023-09-23
Attending: ANESTHESIOLOGY | Admitting: ANESTHESIOLOGY
Payer: COMMERCIAL

## 2023-08-24 VITALS
HEIGHT: 76 IN | SYSTOLIC BLOOD PRESSURE: 113 MMHG | TEMPERATURE: 97.2 F | RESPIRATION RATE: 16 BRPM | HEART RATE: 82 BPM | OXYGEN SATURATION: 94 % | BODY MASS INDEX: 37.75 KG/M2 | WEIGHT: 309.97 LBS | DIASTOLIC BLOOD PRESSURE: 67 MMHG

## 2023-08-24 DIAGNOSIS — I26.99 ACUTE PULMONARY EMBOLISM WITHOUT ACUTE COR PULMONALE (HCC): ICD-10-CM

## 2023-08-24 DIAGNOSIS — F32.A DEPRESSION: Chronic | ICD-10-CM

## 2023-08-24 DIAGNOSIS — Z93.0 STATUS POST TRACHEOSTOMY (HCC): ICD-10-CM

## 2023-08-24 DIAGNOSIS — K59.00 CONSTIPATION: ICD-10-CM

## 2023-08-24 DIAGNOSIS — G93.40 ACUTE ENCEPHALOPATHY: ICD-10-CM

## 2023-08-24 DIAGNOSIS — G92.8 TOXIC METABOLIC ENCEPHALOPATHY: ICD-10-CM

## 2023-08-24 DIAGNOSIS — Z86.39 HISTORY OF WERNICKE'S ENCEPHALOPATHY: Primary | ICD-10-CM

## 2023-08-24 DIAGNOSIS — E55.9 VITAMIN D DEFICIENCY: ICD-10-CM

## 2023-08-24 DIAGNOSIS — R52 MILD PAIN: ICD-10-CM

## 2023-08-24 DIAGNOSIS — R06.2 WHEEZING: ICD-10-CM

## 2023-08-24 DIAGNOSIS — Z93.1 S/P PERCUTANEOUS ENDOSCOPIC GASTROSTOMY (PEG) TUBE PLACEMENT (HCC): ICD-10-CM

## 2023-08-24 DIAGNOSIS — G93.40 ENCEPHALOPATHY: ICD-10-CM

## 2023-08-24 DIAGNOSIS — J96.01 ACUTE RESPIRATORY FAILURE WITH HYPOXIA (HCC): ICD-10-CM

## 2023-08-24 DIAGNOSIS — J18.9 PNEUMONIA: ICD-10-CM

## 2023-08-24 PROBLEM — A41.9 SEPSIS (HCC): Status: ACTIVE | Noted: 2023-08-24

## 2023-08-24 LAB
ANION GAP SERPL CALCULATED.3IONS-SCNC: 2 MMOL/L
ANION GAP SERPL CALCULATED.3IONS-SCNC: 3 MMOL/L
ANION GAP SERPL CALCULATED.3IONS-SCNC: 8 MMOL/L
APTT PPP: 104 SECONDS (ref 23–37)
APTT PPP: 64 SECONDS (ref 23–37)
APTT PPP: 84 SECONDS (ref 23–37)
APTT PPP: >210 SECONDS (ref 23–37)
BACTERIA UR QL AUTO: ABNORMAL /HPF
BASE EX.OXY STD BLDV CALC-SCNC: 87 % (ref 60–80)
BASE EXCESS BLDA CALC-SCNC: 10.2 MMOL/L
BASE EXCESS BLDA CALC-SCNC: 13 MMOL/L
BASE EXCESS BLDV CALC-SCNC: 3.4 MMOL/L
BILIRUB UR QL STRIP: NEGATIVE
BODY TEMPERATURE: 97 DEGREES FEHRENHEIT
BODY TEMPERATURE: 97.5 DEGREES FEHRENHEIT
BUN SERPL-MCNC: 7 MG/DL (ref 5–25)
BUN SERPL-MCNC: 7 MG/DL (ref 5–25)
BUN SERPL-MCNC: 8 MG/DL (ref 5–25)
CALCIUM SERPL-MCNC: 8.4 MG/DL (ref 8.4–10.2)
CALCIUM SERPL-MCNC: 8.5 MG/DL (ref 8.4–10.2)
CALCIUM SERPL-MCNC: 8.9 MG/DL (ref 8.4–10.2)
CHLORIDE SERPL-SCNC: 83 MMOL/L (ref 96–108)
CHLORIDE SERPL-SCNC: 83 MMOL/L (ref 96–108)
CHLORIDE SERPL-SCNC: 86 MMOL/L (ref 96–108)
CLARITY UR: CLEAR
CO2 SERPL-SCNC: 35 MMOL/L (ref 21–32)
CO2 SERPL-SCNC: 42 MMOL/L (ref 21–32)
CO2 SERPL-SCNC: 42 MMOL/L (ref 21–32)
COLOR UR: YELLOW
CORTIS AM PEAK SERPL-MCNC: 7.4 UG/DL (ref 6.7–22.6)
CREAT SERPL-MCNC: 0.59 MG/DL (ref 0.6–1.3)
CREAT SERPL-MCNC: 0.62 MG/DL (ref 0.6–1.3)
CREAT SERPL-MCNC: 0.64 MG/DL (ref 0.6–1.3)
ERYTHROCYTE [DISTWIDTH] IN BLOOD BY AUTOMATED COUNT: 13.8 % (ref 11.6–15.1)
FLUAV RNA RESP QL NAA+PROBE: NEGATIVE
FLUBV RNA RESP QL NAA+PROBE: NEGATIVE
GFR SERPL CREATININE-BSD FRML MDRD: 125 ML/MIN/1.73SQ M
GFR SERPL CREATININE-BSD FRML MDRD: 127 ML/MIN/1.73SQ M
GFR SERPL CREATININE-BSD FRML MDRD: 130 ML/MIN/1.73SQ M
GLUCOSE SERPL-MCNC: 102 MG/DL (ref 65–140)
GLUCOSE SERPL-MCNC: 112 MG/DL (ref 65–140)
GLUCOSE SERPL-MCNC: 208 MG/DL (ref 65–140)
GLUCOSE SERPL-MCNC: 97 MG/DL (ref 65–140)
GLUCOSE UR STRIP-MCNC: NEGATIVE MG/DL
HCO3 BLDA-SCNC: 35.6 MMOL/L (ref 22–28)
HCO3 BLDA-SCNC: 39 MMOL/L (ref 22–28)
HCO3 BLDV-SCNC: 33 MMOL/L (ref 24–30)
HCT VFR BLD AUTO: 38.5 % (ref 36.5–49.3)
HGB BLD-MCNC: 13 G/DL (ref 12–17)
HGB UR QL STRIP.AUTO: NEGATIVE
HOROWITZ INDEX BLDA+IHG-RTO: 100 MM[HG]
HOROWITZ INDEX BLDA+IHG-RTO: 100 MM[HG]
KETONES UR STRIP-MCNC: NEGATIVE MG/DL
L PNEUMO1 AG UR QL IA.RAPID: NEGATIVE
LACTATE SERPL-SCNC: 1 MMOL/L (ref 0.5–2)
LEUKOCYTE ESTERASE UR QL STRIP: NEGATIVE
MCH RBC QN AUTO: 32.7 PG (ref 26.8–34.3)
MCHC RBC AUTO-ENTMCNC: 33.8 G/DL (ref 31.4–37.4)
MCV RBC AUTO: 97 FL (ref 82–98)
MUCOUS THREADS UR QL AUTO: ABNORMAL
NITRITE UR QL STRIP: NEGATIVE
NON-SQ EPI CELLS URNS QL MICRO: ABNORMAL /HPF
O2 CT BLDA-SCNC: 13.9 ML/DL (ref 16–23)
O2 CT BLDA-SCNC: 17 ML/DL (ref 16–23)
O2 CT BLDV-SCNC: 17 ML/DL
OSMOLALITY UR/SERPL-RTO: 267 MMOL/KG (ref 282–298)
OSMOLALITY UR: 335 MMOL/KG
OXYHGB MFR BLDA: 75.5 % (ref 94–97)
OXYHGB MFR BLDA: 89.1 % (ref 94–97)
PCO2 BLDA: 50.9 MM HG (ref 36–44)
PCO2 BLDA: 55.5 MM HG (ref 36–44)
PCO2 BLDV: 76.1 MM HG (ref 42–50)
PCO2 TEMP ADJ BLDA: 48.9 MM HG (ref 36–44)
PCO2 TEMP ADJ BLDA: 54.1 MM HG (ref 36–44)
PEEP RESPIRATORY: 10 CM[H2O]
PEEP RESPIRATORY: 12 CM[H2O]
PH BLD: 7.47 [PH] (ref 7.35–7.45)
PH BLD: 7.48 [PH] (ref 7.35–7.45)
PH BLDA: 7.46 [PH] (ref 7.35–7.45)
PH BLDA: 7.46 [PH] (ref 7.35–7.45)
PH BLDV: 7.25 [PH] (ref 7.3–7.4)
PH UR STRIP.AUTO: 6 [PH]
PLATELET # BLD AUTO: 301 THOUSANDS/UL (ref 149–390)
PMV BLD AUTO: 8.4 FL (ref 8.9–12.7)
PO2 BLD: 34.9 MM HG (ref 75–129)
PO2 BLD: 47.6 MM HG (ref 75–129)
PO2 BLDA: 36.4 MM HG (ref 75–129)
PO2 BLDA: 50.7 MM HG (ref 75–129)
PO2 BLDV: 59.8 MM HG (ref 35–45)
POTASSIUM SERPL-SCNC: 3.5 MMOL/L (ref 3.5–5.3)
POTASSIUM SERPL-SCNC: 3.6 MMOL/L (ref 3.5–5.3)
POTASSIUM SERPL-SCNC: 3.7 MMOL/L (ref 3.5–5.3)
PROCALCITONIN SERPL-MCNC: 0.37 NG/ML
PROT UR STRIP-MCNC: ABNORMAL MG/DL
RBC # BLD AUTO: 3.97 MILLION/UL (ref 3.88–5.62)
RBC #/AREA URNS AUTO: ABNORMAL /HPF
RSV RNA RESP QL NAA+PROBE: NEGATIVE
S PNEUM AG UR QL: NEGATIVE
SARS-COV-2 RNA RESP QL NAA+PROBE: NEGATIVE
SODIUM 24H UR-SCNC: 40 MOL/L
SODIUM SERPL-SCNC: 127 MMOL/L (ref 135–147)
SODIUM SERPL-SCNC: 128 MMOL/L (ref 135–147)
SODIUM SERPL-SCNC: 129 MMOL/L (ref 135–147)
SP GR UR STRIP.AUTO: >=1.03 (ref 1–1.03)
SPECIMEN SOURCE: ABNORMAL
SPECIMEN SOURCE: ABNORMAL
UROBILINOGEN UR QL STRIP.AUTO: 1 E.U./DL
VENT AC: 16
VENT AC: 16
VENT- AC: AC
VENT- AC: AC
VT SETTING VENT: 550 ML
VT SETTING VENT: 550 ML
WBC # BLD AUTO: 15.38 THOUSAND/UL (ref 4.31–10.16)
WBC #/AREA URNS AUTO: ABNORMAL /HPF

## 2023-08-24 PROCEDURE — 70450 CT HEAD/BRAIN W/O DYE: CPT

## 2023-08-24 PROCEDURE — NC001 PR NO CHARGE: Performed by: NURSE PRACTITIONER

## 2023-08-24 PROCEDURE — 94669 MECHANICAL CHEST WALL OSCILL: CPT

## 2023-08-24 PROCEDURE — 71045 X-RAY EXAM CHEST 1 VIEW: CPT

## 2023-08-24 PROCEDURE — 87449 NOS EACH ORGANISM AG IA: CPT | Performed by: NURSE PRACTITIONER

## 2023-08-24 PROCEDURE — 82805 BLOOD GASES W/O2 SATURATION: CPT | Performed by: NURSE PRACTITIONER

## 2023-08-24 PROCEDURE — 87086 URINE CULTURE/COLONY COUNT: CPT | Performed by: NURSE PRACTITIONER

## 2023-08-24 PROCEDURE — 80048 BASIC METABOLIC PNL TOTAL CA: CPT | Performed by: INTERNAL MEDICINE

## 2023-08-24 PROCEDURE — 80048 BASIC METABOLIC PNL TOTAL CA: CPT | Performed by: NURSE PRACTITIONER

## 2023-08-24 PROCEDURE — 99291 CRITICAL CARE FIRST HOUR: CPT | Performed by: NURSE PRACTITIONER

## 2023-08-24 PROCEDURE — 03HY32Z INSERTION OF MONITORING DEVICE INTO UPPER ARTERY, PERCUTANEOUS APPROACH: ICD-10-PCS | Performed by: ANESTHESIOLOGY

## 2023-08-24 PROCEDURE — 82948 REAGENT STRIP/BLOOD GLUCOSE: CPT

## 2023-08-24 PROCEDURE — 94668 MNPJ CHEST WALL SBSQ: CPT

## 2023-08-24 PROCEDURE — 0BH17EZ INSERTION OF ENDOTRACHEAL AIRWAY INTO TRACHEA, VIA NATURAL OR ARTIFICIAL OPENING: ICD-10-PCS | Performed by: INTERNAL MEDICINE

## 2023-08-24 PROCEDURE — 94003 VENT MGMT INPAT SUBQ DAY: CPT

## 2023-08-24 PROCEDURE — 94002 VENT MGMT INPAT INIT DAY: CPT

## 2023-08-24 PROCEDURE — 85027 COMPLETE CBC AUTOMATED: CPT | Performed by: NURSE PRACTITIONER

## 2023-08-24 PROCEDURE — 94760 N-INVAS EAR/PLS OXIMETRY 1: CPT

## 2023-08-24 PROCEDURE — 94150 VITAL CAPACITY TEST: CPT

## 2023-08-24 PROCEDURE — 85730 THROMBOPLASTIN TIME PARTIAL: CPT | Performed by: INTERNAL MEDICINE

## 2023-08-24 PROCEDURE — 0241U HB NFCT DS VIR RESP RNA 4 TRGT: CPT | Performed by: NURSE PRACTITIONER

## 2023-08-24 PROCEDURE — 99239 HOSP IP/OBS DSCHRG MGMT >30: CPT | Performed by: INTERNAL MEDICINE

## 2023-08-24 PROCEDURE — 83605 ASSAY OF LACTIC ACID: CPT | Performed by: NURSE PRACTITIONER

## 2023-08-24 PROCEDURE — 5A1955Z RESPIRATORY VENTILATION, GREATER THAN 96 CONSECUTIVE HOURS: ICD-10-PCS | Performed by: ANESTHESIOLOGY

## 2023-08-24 PROCEDURE — 85730 THROMBOPLASTIN TIME PARTIAL: CPT | Performed by: ANESTHESIOLOGY

## 2023-08-24 PROCEDURE — 94640 AIRWAY INHALATION TREATMENT: CPT

## 2023-08-24 PROCEDURE — 87081 CULTURE SCREEN ONLY: CPT | Performed by: ANESTHESIOLOGY

## 2023-08-24 PROCEDURE — 4A133B1 MONITORING OF ARTERIAL PRESSURE, PERIPHERAL, PERCUTANEOUS APPROACH: ICD-10-PCS | Performed by: ANESTHESIOLOGY

## 2023-08-24 PROCEDURE — 81001 URINALYSIS AUTO W/SCOPE: CPT | Performed by: NURSE PRACTITIONER

## 2023-08-24 PROCEDURE — 5A1935Z RESPIRATORY VENTILATION, LESS THAN 24 CONSECUTIVE HOURS: ICD-10-PCS | Performed by: INTERNAL MEDICINE

## 2023-08-24 PROCEDURE — 74018 RADEX ABDOMEN 1 VIEW: CPT

## 2023-08-24 PROCEDURE — 82805 BLOOD GASES W/O2 SATURATION: CPT | Performed by: STUDENT IN AN ORGANIZED HEALTH CARE EDUCATION/TRAINING PROGRAM

## 2023-08-24 PROCEDURE — 84145 PROCALCITONIN (PCT): CPT | Performed by: NURSE PRACTITIONER

## 2023-08-24 PROCEDURE — 87040 BLOOD CULTURE FOR BACTERIA: CPT | Performed by: NURSE PRACTITIONER

## 2023-08-24 PROCEDURE — 36620 INSERTION CATHETER ARTERY: CPT | Performed by: NURSE PRACTITIONER

## 2023-08-24 PROCEDURE — G1004 CDSM NDSC: HCPCS

## 2023-08-24 PROCEDURE — 87070 CULTURE OTHR SPECIMN AEROBIC: CPT | Performed by: NURSE PRACTITIONER

## 2023-08-24 PROCEDURE — 82533 TOTAL CORTISOL: CPT | Performed by: INTERNAL MEDICINE

## 2023-08-24 PROCEDURE — 87147 CULTURE TYPE IMMUNOLOGIC: CPT | Performed by: NURSE PRACTITIONER

## 2023-08-24 PROCEDURE — 87205 SMEAR GRAM STAIN: CPT | Performed by: NURSE PRACTITIONER

## 2023-08-24 PROCEDURE — 4A133J1 MONITORING OF ARTERIAL PULSE, PERIPHERAL, PERCUTANEOUS APPROACH: ICD-10-PCS | Performed by: ANESTHESIOLOGY

## 2023-08-24 RX ORDER — SUCCINYLCHOLINE/SOD CL,ISO/PF 100 MG/5ML
SYRINGE (ML) INTRAVENOUS CODE/TRAUMA/SEDATION MEDICATION
Status: COMPLETED | OUTPATIENT
Start: 2023-08-24 | End: 2023-08-24

## 2023-08-24 RX ORDER — CHLORHEXIDINE GLUCONATE ORAL RINSE 1.2 MG/ML
15 SOLUTION DENTAL EVERY 12 HOURS SCHEDULED
Status: DISCONTINUED | OUTPATIENT
Start: 2023-08-24 | End: 2023-09-23 | Stop reason: HOSPADM

## 2023-08-24 RX ORDER — HEPARIN SODIUM 1000 [USP'U]/ML
10000 INJECTION, SOLUTION INTRAVENOUS; SUBCUTANEOUS EVERY 6 HOURS PRN
Status: CANCELLED | OUTPATIENT
Start: 2023-08-24

## 2023-08-24 RX ORDER — HEPARIN SODIUM 1000 [USP'U]/ML
5000 INJECTION, SOLUTION INTRAVENOUS; SUBCUTANEOUS EVERY 6 HOURS PRN
Status: DISCONTINUED | OUTPATIENT
Start: 2023-08-24 | End: 2023-08-26

## 2023-08-24 RX ORDER — SODIUM CHLORIDE FOR INHALATION 3 %
4 VIAL, NEBULIZER (ML) INHALATION
Status: DISCONTINUED | OUTPATIENT
Start: 2023-08-24 | End: 2023-09-14

## 2023-08-24 RX ORDER — FLUOXETINE 10 MG/1
10 CAPSULE ORAL DAILY
Status: DISCONTINUED | OUTPATIENT
Start: 2023-08-25 | End: 2023-09-23 | Stop reason: HOSPADM

## 2023-08-24 RX ORDER — HEPARIN SODIUM 1000 [USP'U]/ML
5000 INJECTION, SOLUTION INTRAVENOUS; SUBCUTANEOUS EVERY 6 HOURS PRN
Status: CANCELLED | OUTPATIENT
Start: 2023-08-24

## 2023-08-24 RX ORDER — SODIUM CHLORIDE 9 MG/ML
50 INJECTION, SOLUTION INTRAVENOUS CONTINUOUS
Status: DISCONTINUED | OUTPATIENT
Start: 2023-08-24 | End: 2023-08-24 | Stop reason: HOSPADM

## 2023-08-24 RX ORDER — ETOMIDATE 2 MG/ML
INJECTION INTRAVENOUS CODE/TRAUMA/SEDATION MEDICATION
Status: COMPLETED | OUTPATIENT
Start: 2023-08-24 | End: 2023-08-24

## 2023-08-24 RX ORDER — POTASSIUM CHLORIDE 14.9 MG/ML
20 INJECTION INTRAVENOUS ONCE
Status: COMPLETED | OUTPATIENT
Start: 2023-08-24 | End: 2023-08-24

## 2023-08-24 RX ORDER — HEPARIN SODIUM 1000 [USP'U]/ML
10000 INJECTION, SOLUTION INTRAVENOUS; SUBCUTANEOUS EVERY 6 HOURS PRN
Status: DISCONTINUED | OUTPATIENT
Start: 2023-08-24 | End: 2023-08-26

## 2023-08-24 RX ORDER — FENTANYL CITRATE 50 UG/ML
50 INJECTION, SOLUTION INTRAMUSCULAR; INTRAVENOUS
Status: DISCONTINUED | OUTPATIENT
Start: 2023-08-24 | End: 2023-09-12

## 2023-08-24 RX ORDER — SODIUM CHLORIDE 9 MG/ML
50 INJECTION, SOLUTION INTRAVENOUS CONTINUOUS
Status: CANCELLED | OUTPATIENT
Start: 2023-08-24

## 2023-08-24 RX ORDER — HEPARIN SODIUM 10000 [USP'U]/100ML
3-30 INJECTION, SOLUTION INTRAVENOUS
Status: DISCONTINUED | OUTPATIENT
Start: 2023-08-24 | End: 2023-08-26

## 2023-08-24 RX ORDER — HEPARIN SODIUM 10000 [USP'U]/100ML
3-30 INJECTION, SOLUTION INTRAVENOUS
Status: CANCELLED | OUTPATIENT
Start: 2023-08-24

## 2023-08-24 RX ORDER — SODIUM CHLORIDE 9 MG/ML
50 INJECTION, SOLUTION INTRAVENOUS CONTINUOUS
Status: DISCONTINUED | OUTPATIENT
Start: 2023-08-24 | End: 2023-08-24

## 2023-08-24 RX ORDER — FLUOXETINE 10 MG/1
10 CAPSULE ORAL DAILY
Status: CANCELLED | OUTPATIENT
Start: 2023-08-25

## 2023-08-24 RX ORDER — FENTANYL CITRATE 50 UG/ML
INJECTION, SOLUTION INTRAMUSCULAR; INTRAVENOUS
Status: COMPLETED
Start: 2023-08-24 | End: 2023-08-24

## 2023-08-24 RX ORDER — FENTANYL CITRATE-0.9 % NACL/PF 10 MCG/ML
50 PLASTIC BAG, INJECTION (ML) INTRAVENOUS CONTINUOUS
Status: DISCONTINUED | OUTPATIENT
Start: 2023-08-24 | End: 2023-08-25

## 2023-08-24 RX ADMIN — HEPARIN SODIUM 15 UNITS/KG/HR: 10000 INJECTION, SOLUTION INTRAVENOUS at 05:53

## 2023-08-24 RX ADMIN — VANCOMYCIN HYDROCHLORIDE 2000 MG: 1 INJECTION, POWDER, LYOPHILIZED, FOR SOLUTION INTRAVENOUS at 20:47

## 2023-08-24 RX ADMIN — CEFEPIME 2000 MG: 2 INJECTION, POWDER, FOR SOLUTION INTRAVENOUS at 20:54

## 2023-08-24 RX ADMIN — SODIUM CHLORIDE SOLN NEBU 3% 4 ML: 3 NEBU SOLN at 20:54

## 2023-08-24 RX ADMIN — ETOMIDATE 20 MG: 2 INJECTION, SOLUTION INTRAVENOUS at 14:06

## 2023-08-24 RX ADMIN — POTASSIUM CHLORIDE 20 MEQ: 14.9 INJECTION, SOLUTION INTRAVENOUS at 21:51

## 2023-08-24 RX ADMIN — THIAMINE HYDROCHLORIDE 100 MG: 100 INJECTION, SOLUTION INTRAMUSCULAR; INTRAVENOUS at 11:57

## 2023-08-24 RX ADMIN — SODIUM CHLORIDE 50 ML/HR: 0.9 INJECTION, SOLUTION INTRAVENOUS at 09:19

## 2023-08-24 RX ADMIN — THIAMINE HYDROCHLORIDE 500 MG: 100 INJECTION, SOLUTION INTRAMUSCULAR; INTRAVENOUS at 20:39

## 2023-08-24 RX ADMIN — CHLORHEXIDINE GLUCONATE 15 ML: 1.2 RINSE ORAL at 21:51

## 2023-08-24 RX ADMIN — SODIUM CHLORIDE 125 ML/HR: 0.9 INJECTION, SOLUTION INTRAVENOUS at 00:45

## 2023-08-24 RX ADMIN — HEPARIN SODIUM 15 UNITS/KG/HR: 10000 INJECTION, SOLUTION INTRAVENOUS at 20:48

## 2023-08-24 RX ADMIN — Medication 100 MG: at 14:06

## 2023-08-24 RX ADMIN — FENTANYL CITRATE 50 MCG: 50 INJECTION, SOLUTION INTRAMUSCULAR; INTRAVENOUS at 18:32

## 2023-08-24 NOTE — ASSESSMENT & PLAN NOTE
· Per family, limiting salt intake 2/2 doctors recommendations due to "heart problems"  · TTE from June'22 reviewed: EF 04% normal systolic and diastolic dysfunction, mild LV concentric hypertrophy   · Patient maintained on demadex 20 qd for chronic LE edema   · Na 124 on admission   · S/p 1 L NS and 60 mEq K   · Per chart review previously diagnosed as SIADH + 2/2 diuretic use   · Check sodium studies   · Goal Na 130-132 over the next 24 hours  · Repeat BMP q8h  · Hold diuretic   · Trial fluid restriction

## 2023-08-24 NOTE — UTILIZATION REVIEW
Initial Clinical Review    Admission: Date/Time/Statement:   Admission Orders (From admission, onward)     Ordered        08/23/23 1712  INPATIENT ADMISSION  Once                      Orders Placed This Encounter   Procedures   • INPATIENT ADMISSION     Standing Status:   Standing     Number of Occurrences:   1     Order Specific Question:   Level of Care     Answer:   Med Surg [16]     Order Specific Question:   Estimated length of stay     Answer:   More than 2 Midnights     Order Specific Question:   Certification     Answer:   I certify that inpatient services are medically necessary for this patient for a duration of greater than two midnights. See H&P and MD Progress Notes for additional information about the patient's course of treatment. ED Arrival Information     Expected   -    Arrival   8/23/2023 14:06    Acuity   Emergent            Means of arrival   Ambulance    Escorted by   [facility]    Service   Hospitalist    Admission type   Emergency            Arrival complaint   -           Chief Complaint   Patient presents with   • Shortness of Breath     Pt presents to the ed via ems after his home health nurse repors his o2 to be in the low 80s, nurse was told to send pt here by pcp, hx of TBI        Initial Presentation: 41 y.o. male with PMHx of testicular cancer s/p chemo resection, TBI, Warnicke's encephalopathy, partially blindness who presents to ED with SOB and hypoxia. Pt had a visiting nurse today who noted pt O2 sat in the 80's. On arrival pt noted to be hypoxic and tachypneic, on 3L O2 nc. CT PE study + RLL PE without evidence of R heart strain. Started on a Hep gtt, 1L NSS w/ 60 mEq Kcl in ED. , K 2.8, Ca 8.2. Pt obese, nonverbal, eyes open, decreased breath sounds at bases, mildly distended abdomen,+ b/l LE edema. Pt with PAC, accessed. CXR; no acute infiltrate; L linear base opacification, b/l basal atelectasis.   ADMITTED INPATIENT to [facility] UNIT with ACUTE RLL PE with ACUTE HYPOXIC RESPIRATORY FAILURE, HYPONATREMIA, RECENT ENCEPHALOPATHY of UNKNOWN ETIOLOGY with NEG CSF and SEIZURE W/U --  Continue Hep gtt. Transition to NOAC after price check when pt is awake to take po meds. Speech eval.  Recent admission for encephalopathy extensive w/u at the time was unrevealing. Currently AMS likely contributed by hyponatremia and hypoxia. Per mother intermittent AMS since his recent discharge. Reports fall with head strike a month ago.     Date: 8/24  Day 2: Pt continued to be hypotensive. Levophed gtt started. Pt intubated.  Transfer to [address] [place] [address] for higher level of care      ED Triage Vitals [08/23/23 1415]   Temperature Pulse Respirations Blood Pressure SpO2   98.4 °F (36.9 °C) 98 22 131/67 92 %      Temp Source Heart Rate Source Patient Position - Orthostatic VS BP Location FiO2 (%)   Oral Monitor Lying Left arm --      Pain Score       --          Wt Readings from Last 1 Encounters:   08/23/23 (!) 141 kg (309 lb 15.5 oz)     Additional Vital Signs:   Date/Time Temp Pulse Resp BP MAP (mmHg) SpO2 Calculated FIO2 (%) - Nasal Cannula O2 Flow Rate (L/min) Nasal Cannula O2 Flow Rate (L/min) O2 Device Patient Position - Orthostatic VS   08/24/23 1511 -- -- -- -- -- 97 % -- -- -- -- --   08/24/23 1507 -- 74 16 91/60 -- 97 % -- -- -- Ventilator --   08/24/23 1501 -- 77 16 88/57 Abnormal  -- 96 % -- -- -- Ventilator --   08/24/23 1459 -- 78 16 78/52 Abnormal  -- 95 % -- -- -- Ventilator --   08/24/23 1453 -- 82 15 61/36 Abnormal  -- 93 % -- -- -- Ventilator --   08/24/23 1452 -- 80 12 70/47 Abnormal  -- 94 % -- -- -- Ventilator --   08/24/23 1450 -- -- -- -- -- -- -- -- -- Ventilator --   08/24/23 1445 -- 87 22 74/45 Abnormal  -- 87 % Abnormal  -- -- -- Ventilator --   08/24/23 1440 -- 94 22 72/42 Abnormal  -- 88 % Abnormal  -- -- -- Ventilator --   08/24/23 1413 -- -- -- 107/69 -- 95 % -- -- -- Transtracheal catheter --   08/24/23 1409 -- -- -- -- -- 80 % Abnormal  -- -- -- -- --   08/24/23 1406 -- 87 -- -- -- -- -- -- -- -- --   08/24/23 1404 -- -- -- -- -- 76 % Abnormal  -- -- -- -- --   08/24/23 13:59:44 -- 90 8 Abnormal  113/72 -- -- -- -- -- -- --   08/24/23 1356 -- 92 -- -- -- 54 % Abnormal  -- -- -- Nasal cannula  Lying   O2 Device: BVM at 08/24/23 1356   08/24/23 1125 97.2 °F (36.2 °C) Abnormal  79 18 128/73 -- 90 % -- -- -- -- Lying   08/24/23 0900 -- -- -- -- -- -- 32 -- 3 L/min Nasal cannula --   08/24/23 0742 97.5 °F (36.4 °C) 71 18 124/72 -- 90 % -- -- -- -- Lying   08/24/23 0323 97.2 °F (36.2 °C) Abnormal  73 18 150/69 99 97 % 32 3 L/min 3 L/min Nasal cannula Lying   08/23/23 2339 97.7 °F (36.5 °C) 69 18 126/60 -- 97 % 32 -- 3 L/min Nasal cannula Lying   08/23/23 1918 98.2 °F (36.8 °C) 73 18 105/60 -- 95 % 32 -- 3 L/min Nasal cannula Lying   08/23/23 1448 -- -- -- -- -- -- -- 3 L/min -- Nasal cannula --   08/23/23 1415 98.4 °F (36.9 °C) 98 22 131/67 -- 92 % 32 -- 3 L/min Nasal cannula Lying       Pertinent Labs/Diagnostic Test Results:   XR abdomen 1 view kub   Final Result by Kirsten Austin MD (08/24 1151)      Nonobstructive bowel gas pattern. CTA ED chest PE Study   Final Result by Katie Ojeda MD (08/23 1643)      Pulmonary embolism in the right lower lobe segmental pulmonary artery. Measured RV/LV ratio is within normal limits at less than 0.9. Hepatic steatosis. XR chest portable   ED Interpretation by Ally Guerrero MD (08/23 2838)   CXR; no acute infiltrate; L linear base opacification , bilateral basal atelectasis      Final Result by Adelaida Atkinson MD (08/24 3394)   Probable chronic atelectasis or scarring left lung base   Findings otherwise stable   No acute cardiopulmonary disease.         EKG 8/23:  Normal sinus rhythm with sinus arrhythmia  Nonspecific ST abnormality  Abnormal ECG  When compared with ECG of 11-AUG-2023 16:27,  PA interval has decreased  ST now depressed in Lateral leads  Nonspecific T wave abnormality now evident in Inferior leads        Results from last 7 days   Lab Units 08/23/23  1435   WBC Thousand/uL 10.11   HEMOGLOBIN g/dL 14.3   HEMATOCRIT % 41.6   PLATELETS Thousands/uL 358   NEUTROS ABS Thousands/µL 7.95*     Results from last 7 days   Lab Units 08/24/23  0834 08/24/23  0553 08/23/23  2312 08/23/23  1435   SODIUM mmol/L 128* 127* 126*  126* 124*   POTASSIUM mmol/L 3.7 3.6 3.5  3.5 2.8*   CHLORIDE mmol/L 83* 83* 80*  81* 78*   CO2 mmol/L 42* 42* 41*  38* 37*   ANION GAP mmol/L 3 2 5  7 9   BUN mg/dL 7 7 8  8 9   CREATININE mg/dL 0.64 0.62 0.80  0.75 0.92   EGFR ml/min/1.73sq m 125 127 114  118 106   CALCIUM mg/dL 8.5 8.4 8.1*  8.2* 9.1   MAGNESIUM mg/dL  --   --  2.3  --      Results from last 7 days   Lab Units 08/23/23  1435   AST U/L 29   ALT U/L 73*   ALK PHOS U/L 123*   TOTAL PROTEIN g/dL 7.5   ALBUMIN g/dL 4.2   TOTAL BILIRUBIN mg/dL 0.52     Results from last 7 days   Lab Units 08/24/23  0834 08/24/23  0553 08/23/23  2312 08/23/23  1435   GLUCOSE RANDOM mg/dL 97 102 111  109 130     Results from last 7 days   Lab Units 08/23/23  2312   OSMOLALITY, SERUM mmol/*      Results from last 7 days   Lab Units 08/23/23  1526   PH ART  7.380   PCO2 ART mm Hg 61.9*   PO2 ART mm Hg 71.7*   HCO3 ART mmol/L 35.8*   BASE EXC ART mmol/L 8.3   O2 CONTENT ART mL/dL 19.2   O2 HGB, ARTERIAL % 92.1*   ABG SOURCE  Radial, Left     Results from last 7 days   Lab Units 08/23/23  1435   D-DIMER QUANTITATIVE ug/ml FEU 1.11*     Results from last 7 days   Lab Units 08/24/23  0834 08/24/23  0038 08/23/23  1435   PROTIME seconds  --   --  13.1   INR   --   --  0.96   PTT seconds 84* >210* 28     Results from last 7 days   Lab Units 08/23/23  1444   LACTIC ACID mmol/L 1.9     Results from last 7 days   Lab Units 08/23/23  1435   BNP pg/mL 9     Results from last 7 days   Lab Units 08/23/23  2329 08/23/23  2312   OSMOLALITY, SERUM mmol/KG  --  267*   OSMO UR mmol/  --      Results from last 7 days   Lab Units 08/23/23  1444 08/23/23  1435   BLOOD CULTURE  Received in Microbiology Lab. Culture in Progress. Received in Microbiology Lab. Culture in Progress. ED Treatment:   Medication Administration from 08/23/2023 1405 to 08/23/2023 1900       Date/Time Order Dose Route Action     08/23/2023 1637 EDT sodium chloride 0.9 % infusion 125 mL/hr Intravenous New Bag     08/23/2023 1545 EDT potassium chloride (K-DUR,KLOR-CON) CR tablet 40 mEq 40 mEq Oral Given     08/23/2023 1632 EDT potassium chloride 20 mEq IVPB (premix) 20 mEq Intravenous New Bag     08/23/2023 1826 EDT heparin (porcine) injection 10,000 Units 10,000 Units Intravenous Given       Past Medical History:   Diagnosis Date   • Anxiety    • Cancer ([address])    • Depression    • Psychiatric disorder     bipolar     Present on Admission:  • Acute pulmonary embolism without acute cor pulmonale (HCC)  • Depression  • Hyponatremia  • Hypokalemia  • 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HCC)      Admitting Diagnosis: Hypokalemia [E87.6]  Hypercapnia [R06.89]  Hyponatremia [E87.1]  SOB (shortness of breath) [R06.02]  Hypoxia [R09.02]  Single subsegmental pulmonary embolism without acute cor pulmonale (HCC) [I26.93]  Age/Sex: 41 y.o. male  Admission Orders:  Scheduled Medications:  FLUoxetine, 10 mg, Oral, Daily  thiamine, 100 mg, Intravenous, Daily    Continuous IV Infusions:  heparin (porcine), 3-30 Units/kg/hr (Order-Specific), Intravenous, Titrated  sodium chloride, 50 mL/hr, Intravenous, Continuous    PRN Meds:  heparin (porcine), 10,000 Units, Intravenous, Q6H PRN  heparin (porcine), 5,000 Units, Intravenous, Q6H PRN          Network Utilization Review Department  ATTENTION: Please call with any questions or concerns to [phone] and carefully listen to the prompts so that you are directed to the right person.  All voicemails are confidential.  Lili Khan all requests for admission clinical reviews, approved or denied determinations and any other requests to dedicated fax number below belonging to the campus where the patient is receiving treatment.  List of dedicated fax numbers for the Facilities:  [facility] (Administrative/Medical Necessity) [phone] [address] [facility] (Maternity/NICU/Pediatrics) [phone]   [address] [place] [address] [phone]   [facility] [phone] [address] [place] [address] [phone]   [address] [address] [phone]

## 2023-08-24 NOTE — LETTER
To: [facility]  From: [place], [place] [phone]    Referral for Replaced by [facility] rehab. Family is capable of caring for pt at home if weaning is unsuccessful. Please consider.     Thank you

## 2023-08-24 NOTE — ASSESSMENT & PLAN NOTE
K 2. 8 POA   · Patient has a history of hypokalemia, chronically on kdur 10 mEq qd   · Suspect worsening hypokalemia 2/2 poor po intake and diuretic use   · caution w/ supplemental potassium given concomitant hyponatremia   · BMP q8h  · Also w.  History of HTN, check shanta/renin ratio  · Hold diuretic complains of pain/discomfort

## 2023-08-24 NOTE — ASSESSMENT & PLAN NOTE
Patient presented to [facility] on 8/23 with hypoxia and encephalopathy. Today, had acute change in GCS prompting rapid response and subsequent intubation for airway protection. Of note, recent hospitalization 8/11-17 for encephalopathy with unclear etiology despite extensive workup but Wernicke's encephalopathy on the differential  · [facility] hospitalization notable for rapid response and intubation on 8/12 for airway protection with extubation on 8/13. Treated with high dose thiamine. Evaluated by psychiatry for depressed mood, possible bipolar disorder. Discharged to home with his sister with visiting nurses, speech therapy, PT/OT  · [place] workup  · CT head 8/11: No acute intracranial abnormality. Stable small bilateral subcortical hypoattenuating foci. No associated mass effect. · MRI 8/12: No acute intracranial abnormality. Stable nonspecific enhancement along the anterior floor of the third ventricle/hypothalamus when comparing to the March 15, 2023 study. Differential includes chronic toxic metabolic insult (thiamine deficiency), chronic inflammatory/ infectious conditions. Other considerations include Langerhans cell cell histiocytosis, lymphocytic hypophysitis, and neurosyphilis. Primary CNS lymphoma is a less likely consideration, particularly given the lack of interval change. Serologic correlation is recommended. Stable cerebral white matter signal abnormality, presumably the sequela of chronic microangiopathic disease given the patient's history  · VEEG: negative for seizure activity   · LP: meningitis/encephalitis panel negative, culture negative, lyme and cryptococcal negative, paraneoplastic and autoimmune CSF studies still pending (sent to [facility])  · Today, patient's girlfriend visited [facility] and found him unresponsive. Notes said that 'eyes were rolled back with secretions around his mouth". Rapid response was called and he was intubated for hypoxia and low GCS. VBG ?  Post intubation 7. 25/76/59/33  · CT head 8/24: No acute intracranial abnormality. · On arrival to [facility], patient sedated on fentanyl, agitated and moving all extremities. Was able to follow commands with extremities prior to fentanyl bolus. Plan:  · Encephalopathy likely multifactorial in the setting of abnormal (however stable) MRI showing possible wernicke's encephalopathy, hypercarbia, hyponatremia  · Recheck ABG to assess for hypercarbia  · Recommend BiPAP vs CPAP HS while inpatient once extubated.  Patient would benefit from outpatient PFTs and possible CPAP HS  · Will increase thiamine dose to 500mg Q8   · Spot EEG tomorrow, consideration for transfer to [facility] for cEEG if positive  · Follow up previous LP send out studies, low utility for repeat LP  · Neurology consult, appreciate recommendations  · Hold all sedating meds  · CAM ICU  · Sleep hygiene  · Neurochecks per routine

## 2023-08-24 NOTE — DISCHARGE INSTR - OTHER ORDERS
Skin Care Plan:  1-Cleanse sacro-buttocks with soap and water. Pat dry. Apply Triad Paste to Sacro-Buttocks Wounds BID and PRN episodes of incontinence. 2-Turn/reposition q2h or when medically stable for pressure re-distribution on skin . 3-Elevate heels to offload pressure. 4-Moisturize skin daily with skin nourishing cream  5-Ehob cushion in chair when out of bed.   6-Preventative Hydraguard to bilateral heels BID and PRN.   7-P-500 Specialty Mattress Ordered for Patient

## 2023-08-24 NOTE — LETTER
To: [facility]  From: [place], [place] [phone]    Referral for Donald Davies for [facility] rehab. Please let me know if additional information is needed.     Thank you

## 2023-08-24 NOTE — WOUND OSTOMY CARE
Consult Note - Wound   Preethi Arzate 41 y.o. male MRN: [id number]  Unit/Bed#: -01 Encounter: [id number]        History and Present Illness:  Patient is a 40 yo male that was admitted to [facility] for treatment of acute pulmonary embolism. Patient has a PMH of testicular cancer s/p chemo resection, TBI, Warnicke's encephalopathy, partially blindness. Patient is a max assist x3 for turning and repositioning. Dependent for all care needs at time of assessment. Patient is incontinent of bowel and bladder is managed via external urinary management system. On assessment, patient is lying on regular mattress. Specialty Mattress ordered for patient. Wound Care was consulted for sacro-buttocks wound. Assessment Findings:   B/L heels are dry intact and tiffanie with no skin loss or wounds present. Recommend preventative Hydraguard Cream and proper offloading/ repositioning. 1. POA B/L Sacro-Buttocks Unstageable Pressure Injury: likely pressure moisture in etiology. Scattered of areas of full thickness and partial thickness akin loss from mid sacro-buttocks to right buttocks measured together. Mid sacro-buttocks with full thickness skin loss, 100% yellow slough wound bed, fragile scar tissue donna-wound. Right buttocks with scattered areas of partial and full thickness skin loss with mix of yellow slough and pink tissue. Donna-wounds are fragile, hyperpigmented, scar tissue. Scant yellow drainage noted from both areas. Recommend triad paste to area and specialty mattress. 2. Left Anterior Knee: intact, well adhered pink scab. No skin loss or drainage present. Leave Area JANEY. No induration, fluctuance, odor, warmth/temperature differences, redness, or purulence noted to the above noted wounds and skin areas assessed. New dressings applied per orders listed below. Patient tolerated well- no s/s of non-verbal pain or discomfort observed during the encounter. Bedside nurse aware of plan of care.  See flow sheets for more detailed assessment findings. Orders listed below and wound care will continue to follow, call or tiger text with questions. Skin Care Plan:  1-Cleanse sacro-buttocks with soap and water. Pat dry. Apply Triad Paste to Sacro-Buttocks Wounds BID and PRN episodes of incontinence. 2-Turn/reposition q2h or when medically stable for pressure re-distribution on skin . 3-Elevate heels to offload pressure. 4-Moisturize skin daily with skin nourishing cream  5-Ehob cushion in chair when out of bed. 6-Preventative Hydraguard to bilateral heels BID and PRN.   7-P-500 Specialty Mattress Ordered for Patient       Wounds:  Wound 08/11/23 Pressure Injury Buttocks (Active)   Wound Image    08/24/23 0815   Wound Description Yellow;Slough;Pink 08/24/23 0815   Pressure Injury Stage U 08/24/23 0815   Donna-wound Assessment Hyperpigmented;Fragile;Scar Tissue 08/24/23 0815   Wound Length (cm) 4 cm 08/24/23 0815   Wound Width (cm) 6.5 cm 08/24/23 0815   Wound Depth (cm) 0.3 cm 08/24/23 0815   Wound Surface Area (cm^2) 26 cm^2 08/24/23 0815   Wound Volume (cm^3) 7.8 cm^3 08/24/23 0815   Calculated Wound Volume (cm^3) 7.8 cm^3 08/24/23 0815   Change in Wound Size % -225 08/24/23 0815   Wound Site Closure BLANCO 08/23/23 1238   Drainage Amount Scant 08/24/23 0815   Drainage Description Yellow 08/24/23 0815   Non-staged Wound Description Full thickness 08/24/23 0815   Treatments Cleansed;Irrigation with NSS;Site care 08/24/23 0815   Dressing Protective barrier 08/24/23 0815   Dressing Changed New 08/24/23 0815   Patient Tolerance Tolerated well 08/24/23 0815   Dressing Status Clean; Intact;Dry 08/24/23 0815       Wound 08/23/23 Traumatic Abrasion(s) Knee Anterior; Left (Active)   Wound Image   08/24/23 0817   Wound Description Intact; Pink 08/24/23 0817   Donna-wound Assessment Intact 08/24/23 0817   Wound Length (cm) 0 cm 08/24/23 0817   Wound Width (cm) 0 cm 08/24/23 0817   Wound Depth (cm) 0 cm 08/24/23 0817 Wound Surface Area (cm^2) 0 cm^2 08/24/23 0817   Wound Volume (cm^3) 0 cm^3 08/24/23 0817   Calculated Wound Volume (cm^3) 0 cm^3 08/24/23 0817   Dressing Open to air 08/24/23 11 [facility], [facility], [facility] [employer] [facility]

## 2023-08-24 NOTE — CASE COMMUNICATION
Thank you for the help and I agree patient is very sick and oxygen saturation is low, he is lethargic and was advised to go to ED, should not be discharged when hypoxic without home oxygen. Ann-Marie with all his comorbidities, he might need Rehab.

## 2023-08-24 NOTE — DISCHARGE SUMMARY
Discharge Summary - [facility]    Patient Information: eBe Segura 41 y.o. male MRN: [id number]  Unit/Bed#: Transfer 03 Encounter: [id number]    Discharging Physician / Practitioner: Cristal Simms MD  PCP: Adolph Martinez MD  Admission Date: 8/23/2023  Discharge Date: 08/24/23    Disposition:     Other: [address] [facility] ICU      Reason for Admission: Hypoxia    Discharge Diagnoses:    Encephalopathy   Acute pulmonary embolism without acute cor pulmonale ([address])  Acute hypoxic respiratory failure due to above  Hyponatremia    Procedures Performed:     · Intubation for encephalopathy and hypoxia    Significant Findings / Test Results:     · CTA chest showed pulmonary embolism in the right lower lobe segmental pulmonary artery. Hepatic steatosis  · CT head w/o negative  · Abdominal x-ray nonobstructive bowel gas pattern      Hospital Course:     Bee Segura is a 41 y.o. 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 He was found to have right lower lobe segmental PE. Heparin drip was started. Patient had been hemodynamically stable until around 2:00 this afternoon, rapid response was called for acutely worsened mental status and required intubation for hypoxia and encephalopathy. Prior to that PCA and nursing noted his eyes rolled back in his head with clear secretions from the corner of his mouth, with no associated involuntary movements. Stat CT head was obtained to be negative. Of note, patient was recently hospitalized at [facility] from Aug 11-17 for encephalopathy and required intubation at the time due to low GCS. The etiology of encephalopathy was unclear. He underwent extensive neurological work-up at the time including negative CSF study. Video EEG showed no evidence of seizure .   MRI of brain at that time showed nonspecific enhancement. He was discharged on thiamine for presumed Wernicke encephalopathy. Per patient's family, he has been experiencing intermittent lethargy and encephalopathy since the recent discharge. At this point, Pt will be transferred [facility] ICU for critical care needs. He may benefit from further video EEG monitoring if deemed necessary after neurology eval. Dr. Corina Barajas has kindly accepted the pt. Multiple family members updated at bedside      Discharge Day Visit / Exam:     * Please refer to separate progress note for these details *    Discussion with Family: Yes      Discharge instructions/Information to patient and family:   See after visit summary for information provided to patient and family. Provisions for Follow-Up Care:  See after visit summary for information related to follow-up care and any pertinent home health orders. Planned Readmission: Yes    Discharge Statement:  I spent 30  minutes discharging the patient. This time was spent on the day of discharge. I had direct contact with the patient on the day of discharge. Greater than 50% of the total time was spent examining patient, answering all patient questions, arranging and discussing plan of care with patient as well as directly providing post-discharge instructions. Additional time then spent on discharge activities. Discharge Medications:  See after visit summary for reconciled discharge medications provided to patient and family. ** Please Note: This note has been constructed using a voice recognition system.  **

## 2023-08-24 NOTE — PLAN OF CARE
Problem: Potential for Falls  Goal: Patient will remain free of falls  Description: INTERVENTIONS:  - Educate patient/family on patient safety including physical limitations  - Instruct patient to call for assistance with activity   - Consult OT/PT to assist with strengthening/mobility   - Keep Call bell within reach  - Keep bed low and locked with side rails adjusted as appropriate  - Keep care items and personal belongings within reach  - Initiate and maintain comfort rounds  - Make Fall Risk Sign visible to staff  - Offer Toileting every 4 Hours, in advance of need  - Initiate/Maintain bed alarm  - Obtain necessary fall risk management equipment: walker  - Apply yellow socks and bracelet for high fall risk patients  - Consider moving patient to room near nurses station  Outcome: Progressing     Problem: MOBILITY - ADULT  Goal: Maintain or return to baseline ADL function  Description: INTERVENTIONS:  -  Assess patient's ability to carry out ADLs; assess patient's baseline for ADL function and identify physical deficits which impact ability to perform ADLs (bathing, care of mouth/teeth, toileting, grooming, dressing, etc.)  - Assess/evaluate cause of self-care deficits   - Assess range of motion  - Assess patient's mobility; develop plan if impaired  - Assess patient's need for assistive devices and provide as appropriate  - Encourage maximum independence but intervene and supervise when necessary  - Involve family in performance of ADLs  - Assess for home care needs following discharge   - Consider OT consult to assist with ADL evaluation and planning for discharge  - Provide patient education as appropriate  Outcome: Progressing     Problem: Prexisting or High Potential for Compromised Skin Integrity  Goal: Skin integrity is maintained or improved  Description: INTERVENTIONS:  - Identify patients at risk for skin breakdown  - Assess and monitor skin integrity  - Assess and monitor nutrition and hydration status  - Monitor labs   - Assess for incontinence   - Turn and reposition patient  - Assist with mobility/ambulation  - Relieve pressure over bony prominences  - Avoid friction and shearing  - Provide appropriate hygiene as needed including keeping skin clean and dry  - Evaluate need for skin moisturizer/barrier cream  - Collaborate with interdisciplinary team   - Patient/family teaching  - Consider wound care consult   Outcome: Progressing     Problem: PAIN - ADULT  Goal: Verbalizes/displays adequate comfort level or baseline comfort level  Description: Interventions:  - Encourage patient to monitor pain and request assistance  - Assess pain using appropriate pain scale  - Administer analgesics based on type and severity of pain and evaluate response  - Implement non-pharmacological measures as appropriate and evaluate response  - Consider cultural and social influences on pain and pain management  - Notify physician/advanced practitioner if interventions unsuccessful or patient reports new pain  Outcome: Progressing     Problem: NEUROSENSORY - ADULT  Goal: Achieves stable or improved neurological status  Description: INTERVENTIONS  - Monitor and report changes in neurological status  - Monitor vital signs such as temperature, blood pressure, glucose, and any other labs ordered   - Initiate measures to prevent increased intracranial pressure  - Monitor for seizure activity and implement precautions if appropriate      Outcome: Progressing     Problem: RESPIRATORY - ADULT  Goal: Achieves optimal ventilation and oxygenation  Description: INTERVENTIONS:  - Assess for changes in respiratory status  - Assess for changes in mentation and behavior  - Position to facilitate oxygenation and minimize respiratory effort  - Oxygen administered by appropriate delivery if ordered  - Initiate smoking cessation education as indicated  - Encourage broncho-pulmonary hygiene including cough, deep breathe, Incentive Spirometry  - Assess the need for suctioning and aspirate as needed  - Assess and instruct to report SOB or any respiratory difficulty  - Respiratory Therapy support as indicated  Outcome: Progressing

## 2023-08-24 NOTE — RESPIRATORY THERAPY NOTE
[place] [facility] [place] 840 ventilator, SpO2 in the 70s, sx ETT for copious amount of thick tan/yellow secretions and SpO2 started to increase, Dr Alla Palencia at bedside

## 2023-08-24 NOTE — PLAN OF CARE
Problem: Potential for Falls  Goal: Patient will remain free of falls  Description: INTERVENTIONS:  - Educate patient/family on patient safety including physical limitations  - Instruct patient to call for assistance with activity   - Consult OT/PT to assist with strengthening/mobility   - Keep Call bell within reach  - Keep bed low and locked with side rails adjusted as appropriate  - Keep care items and personal belongings within reach  - Initiate and maintain comfort rounds  - Make Fall Risk Sign visible to staff  - Apply yellow socks and bracelet for high fall risk patients  - Consider moving patient to room near nurses station  Outcome: Not Progressing     Problem: MOBILITY - ADULT  Goal: Maintain or return to baseline ADL function  Description: INTERVENTIONS:  -  Assess patient's ability to carry out ADLs; assess patient's baseline for ADL function and identify physical deficits which impact ability to perform ADLs (bathing, care of mouth/teeth, toileting, grooming, dressing, etc.)  - Assess/evaluate cause of self-care deficits   - Assess range of motion  - Assess patient's mobility; develop plan if impaired  - Assess patient's need for assistive devices and provide as appropriate  - Encourage maximum independence but intervene and supervise when necessary  - Involve family in performance of ADLs  - Assess for home care needs following discharge   - Consider OT consult to assist with ADL evaluation and planning for discharge  - Provide patient education as appropriate  Outcome: Not Progressing  Goal: Maintains/Returns to pre admission functional level  Description: INTERVENTIONS:  - Perform BMAT or MOVE assessment daily.   - Set and communicate daily mobility goal to care team and patient/family/caregiver.    - Collaborate with rehabilitation services on mobility goals if consulted  - Out of bed for toileting  - Record patient progress and toleration of activity level   Outcome: Not Progressing     Problem: Prexisting or High Potential for Compromised Skin Integrity  Goal: Skin integrity is maintained or improved  Description: INTERVENTIONS:  - Identify patients at risk for skin breakdown  - Assess and monitor skin integrity  - Assess and monitor nutrition and hydration status  - Monitor labs   - Assess for incontinence   - Turn and reposition patient  - Assist with mobility/ambulation  - Relieve pressure over bony prominences  - Avoid friction and shearing  - Provide appropriate hygiene as needed including keeping skin clean and dry  - Evaluate need for skin moisturizer/barrier cream  - Collaborate with interdisciplinary team   - Patient/family teaching  - Consider wound care consult   Outcome: Not Progressing     Problem: PAIN - ADULT  Goal: Verbalizes/displays adequate comfort level or baseline comfort level  Description: Interventions:  - Encourage patient to monitor pain and request assistance  - Assess pain using appropriate pain scale  - Administer analgesics based on type and severity of pain and evaluate response  - Implement non-pharmacological measures as appropriate and evaluate response  - Consider cultural and social influences on pain and pain management  - Notify physician/advanced practitioner if interventions unsuccessful or patient reports new pain  Outcome: Not Progressing     Problem: NEUROSENSORY - ADULT  Goal: Achieves stable or improved neurological status  Description: INTERVENTIONS  - Monitor and report changes in neurological status  - Monitor vital signs such as temperature, blood pressure, glucose, and any other labs ordered   - Initiate measures to prevent increased intracranial pressure  - Monitor for seizure activity and implement precautions if appropriate      Outcome: Not Progressing     Problem: RESPIRATORY - ADULT  Goal: Achieves optimal ventilation and oxygenation  Description: INTERVENTIONS:  - Assess for changes in respiratory status  - Assess for changes in mentation and behavior  - Position to facilitate oxygenation and minimize respiratory effort  - Oxygen administered by appropriate delivery if ordered  - Initiate smoking cessation education as indicated  - Encourage broncho-pulmonary hygiene including cough, deep breathe, Incentive Spirometry  - Assess the need for suctioning and aspirate as needed  - Assess and instruct to report SOB or any respiratory difficulty  - Respiratory Therapy support as indicated  Outcome: Not Progressing

## 2023-08-24 NOTE — RESPIRATORY THERAPY NOTE
Pt's oxygen saturations dropped to the 40's and stayed in the 40's while preparing pt for transport to CT. Pt is unstable at this time. Increase VT to 650 and Peep to 10 per MD order. Oxygen saturations slowly came up to 90%.

## 2023-08-24 NOTE — UTILIZATION REVIEW
NOTIFICATION OF INPATIENT ADMISSION   AUTHORIZATION REQUEST   SERVICING FACILITY:   [address] [place] [address]  [address] [place] [address], [address] [place] [address]  Tax ID: [id number]  NPI: [id number] ATTENDING PROVIDER:  Attending Name and NPI#: Lizzy Damico Md [[id number]]  Address: [address] [place] [address]  Phone:  [phone]     ADMISSION INFORMATION:  Place of Service: Inpatient [address]  Place of Service Code: 21  Inpatient Admission Date/Time: 8/23/23  5:12 PM  Discharge Date/Time: No discharge date for patient encounter. Admitting Diagnosis Code/Description:  Hypokalemia [E87.6]  Hypercapnia [R06.89]  Hyponatremia [E87.1]  SOB (shortness of breath) [R06.02]  Hypoxia [R09.02]  Single subsegmental pulmonary embolism without acute cor pulmonale ([address]) [I26.93]     UTILIZATION REVIEW CONTACT:  Rosa Isela Beaulieu Utilization   Network Utilization Review Department  Phone: [phone]  Fax: [phone]  Email: [email or web address]  Contact for approvals/pending authorizations, clinical reviews, and discharge. PHYSICIAN ADVISORY SERVICES:  Medical Necessity Denial & [facility] Review  Phone: [phone]  Fax: [phone]  Email: [email or web address]

## 2023-08-25 ENCOUNTER — APPOINTMENT (OUTPATIENT)
Dept: PERIOP | Facility: HOSPITAL | Age: 38
DRG: 005 | End: 2023-08-25
Attending: ANESTHESIOLOGY
Payer: COMMERCIAL

## 2023-08-25 ENCOUNTER — APPOINTMENT (OUTPATIENT)
Dept: NEUROLOGY | Facility: HOSPITAL | Age: 38
DRG: 005 | End: 2023-08-25
Payer: COMMERCIAL

## 2023-08-25 ENCOUNTER — APPOINTMENT (OUTPATIENT)
Dept: RADIOLOGY | Facility: HOSPITAL | Age: 38
DRG: 005 | End: 2023-08-25
Payer: COMMERCIAL

## 2023-08-25 ENCOUNTER — APPOINTMENT (INPATIENT)
Dept: NON INVASIVE DIAGNOSTICS | Facility: HOSPITAL | Age: 38
DRG: 005 | End: 2023-08-25
Payer: COMMERCIAL

## 2023-08-25 LAB
ALBUMIN SERPL BCP-MCNC: 3.3 G/DL (ref 3.5–5)
ALP SERPL-CCNC: 113 U/L (ref 34–104)
ALT SERPL W P-5'-P-CCNC: 74 U/L (ref 7–52)
ANION GAP SERPL CALCULATED.3IONS-SCNC: 5 MMOL/L
APTT PPP: 104 SECONDS (ref 23–37)
APTT PPP: 67 SECONDS (ref 23–37)
APTT PPP: 99 SECONDS (ref 23–37)
ARTERIAL PATENCY WRIST A: YES
ARTERIAL PATENCY WRIST A: YES
AST SERPL W P-5'-P-CCNC: 33 U/L (ref 13–39)
BASE EXCESS BLDA CALC-SCNC: 10.6 MMOL/L
BASE EXCESS BLDA CALC-SCNC: 11.9 MMOL/L
BASE EXCESS BLDA CALC-SCNC: 12.2 MMOL/L
BASE EXCESS BLDA CALC-SCNC: 7.7 MMOL/L
BASE EXCESS BLDA CALC-SCNC: 8.4 MMOL/L
BASE EXCESS BLDA CALC-SCNC: 9.7 MMOL/L
BASOPHILS # BLD AUTO: 0.05 THOUSANDS/ÂΜL (ref 0–0.1)
BASOPHILS NFR BLD AUTO: 1 % (ref 0–1)
BILIRUB SERPL-MCNC: 0.53 MG/DL (ref 0.2–1)
BODY TEMPERATURE: 97.7 DEGREES FEHRENHEIT
BODY TEMPERATURE: 97.9 DEGREES FEHRENHEIT
BODY TEMPERATURE: 98.2 DEGREES FEHRENHEIT
BUN SERPL-MCNC: 8 MG/DL (ref 5–25)
CA-I BLD-SCNC: 1.08 MMOL/L (ref 1.12–1.32)
CALCIUM ALBUM COR SERPL-MCNC: 9.4 MG/DL (ref 8.3–10.1)
CALCIUM SERPL-MCNC: 8.8 MG/DL (ref 8.4–10.2)
CHLORIDE SERPL-SCNC: 88 MMOL/L (ref 96–108)
CO2 SERPL-SCNC: 38 MMOL/L (ref 21–32)
CREAT SERPL-MCNC: 0.66 MG/DL (ref 0.6–1.3)
EOSINOPHIL # BLD AUTO: 0.06 THOUSAND/ÂΜL (ref 0–0.61)
EOSINOPHIL NFR BLD AUTO: 1 % (ref 0–6)
ERYTHROCYTE [DISTWIDTH] IN BLOOD BY AUTOMATED COUNT: 14.1 % (ref 11.6–15.1)
GFR SERPL CREATININE-BSD FRML MDRD: 124 ML/MIN/1.73SQ M
GLUCOSE SERPL-MCNC: 107 MG/DL (ref 65–140)
HCO3 BLDA-SCNC: 33.4 MMOL/L (ref 22–28)
HCO3 BLDA-SCNC: 34.6 MMOL/L (ref 22–28)
HCO3 BLDA-SCNC: 34.9 MMOL/L (ref 22–28)
HCO3 BLDA-SCNC: 36.2 MMOL/L (ref 22–28)
HCO3 BLDA-SCNC: 36.8 MMOL/L (ref 22–28)
HCO3 BLDA-SCNC: 39.2 MMOL/L (ref 22–28)
HCT VFR BLD AUTO: 36.4 % (ref 36.5–49.3)
HGB BLD-MCNC: 12.1 G/DL (ref 12–17)
HOROWITZ INDEX BLDA+IHG-RTO: 100 MM[HG]
HOROWITZ INDEX BLDA+IHG-RTO: 90 MM[HG]
IMM GRANULOCYTES # BLD AUTO: 0.03 THOUSAND/UL (ref 0–0.2)
IMM GRANULOCYTES NFR BLD AUTO: 0 % (ref 0–2)
LYMPHOCYTES # BLD AUTO: 1.08 THOUSANDS/ÂΜL (ref 0.6–4.47)
LYMPHOCYTES NFR BLD AUTO: 11 % (ref 14–44)
LYMPHOCYTES NFR BLD AUTO: 2 %
MACROPHAGES NFR FLD: 1 %
MAGNESIUM SERPL-MCNC: 1.8 MG/DL (ref 1.9–2.7)
MCH RBC QN AUTO: 31.8 PG (ref 26.8–34.3)
MCHC RBC AUTO-ENTMCNC: 33.2 G/DL (ref 31.4–37.4)
MCV RBC AUTO: 96 FL (ref 82–98)
MONOCYTES # BLD AUTO: 0.54 THOUSAND/ÂΜL (ref 0.17–1.22)
MONOCYTES NFR BLD AUTO: 6 % (ref 4–12)
NEUTROPHILS # BLD AUTO: 7.97 THOUSANDS/ÂΜL (ref 1.85–7.62)
NEUTS SEG NFR BLD AUTO: 81 % (ref 43–75)
NEUTS SEG NFR BLD AUTO: 97 %
NRBC BLD AUTO-RTO: 0 /100 WBCS
O2 CT BLDA-SCNC: 15.8 ML/DL (ref 16–23)
O2 CT BLDA-SCNC: 16.3 ML/DL (ref 16–23)
O2 CT BLDA-SCNC: 16.4 ML/DL (ref 16–23)
O2 CT BLDA-SCNC: 17.1 ML/DL (ref 16–23)
O2 CT BLDA-SCNC: 17.3 ML/DL (ref 16–23)
O2 CT BLDA-SCNC: 17.5 ML/DL (ref 16–23)
OXYHGB MFR BLDA: 94.7 % (ref 94–97)
OXYHGB MFR BLDA: 94.8 % (ref 94–97)
OXYHGB MFR BLDA: 96.1 % (ref 94–97)
OXYHGB MFR BLDA: 96.6 % (ref 94–97)
OXYHGB MFR BLDA: 97 % (ref 94–97)
OXYHGB MFR BLDA: 98 % (ref 94–97)
PCO2 BLDA: 43.4 MM HG (ref 36–44)
PCO2 BLDA: 49 MM HG (ref 36–44)
PCO2 BLDA: 52.1 MM HG (ref 36–44)
PCO2 BLDA: 57.6 MM HG (ref 36–44)
PCO2 BLDA: 58.2 MM HG (ref 36–44)
PCO2 BLDA: 63.4 MM HG (ref 36–44)
PCO2 TEMP ADJ BLDA: 42.5 MM HG (ref 36–44)
PCO2 TEMP ADJ BLDA: 47.9 MM HG (ref 36–44)
PCO2 TEMP ADJ BLDA: 51 MM HG (ref 36–44)
PCO2 TEMP ADJ BLDA: 56.6 MM HG (ref 36–44)
PCO2 TEMP ADJ BLDA: 56.9 MM HG (ref 36–44)
PCO2 TEMP ADJ BLDA: 62.8 MM HG (ref 36–44)
PEEP RESPIRATORY: 12 CM[H2O]
PEEP RESPIRATORY: 12 CM[H2O]
PEEP RESPIRATORY: 14 CM[H2O]
PH BLD: 7.41 [PH] (ref 7.35–7.45)
PH BLD: 7.41 [PH] (ref 7.35–7.45)
PH BLD: 7.42 [PH] (ref 7.35–7.45)
PH BLD: 7.43 [PH] (ref 7.35–7.45)
PH BLD: 7.5 [PH] (ref 7.35–7.45)
PH BLD: 7.53 [PH] (ref 7.35–7.45)
PH BLDA: 7.4 [PH] (ref 7.35–7.45)
PH BLDA: 7.41 [PH] (ref 7.35–7.45)
PH BLDA: 7.41 [PH] (ref 7.35–7.45)
PH BLDA: 7.42 [PH] (ref 7.35–7.45)
PH BLDA: 7.49 [PH] (ref 7.35–7.45)
PH BLDA: 7.52 [PH] (ref 7.35–7.45)
PLATELET # BLD AUTO: 249 THOUSANDS/UL (ref 149–390)
PMV BLD AUTO: 8.5 FL (ref 8.9–12.7)
PO2 BLD: 113.1 MM HG (ref 75–129)
PO2 BLD: 73.3 MM HG (ref 75–129)
PO2 BLD: 76 MM HG (ref 75–129)
PO2 BLD: 86.2 MM HG (ref 75–129)
PO2 BLD: 87.6 MM HG (ref 75–129)
PO2 BLD: 90.6 MM HG (ref 75–129)
PO2 BLDA: 114.3 MM HG (ref 75–129)
PO2 BLDA: 75.8 MM HG (ref 75–129)
PO2 BLDA: 78 MM HG (ref 75–129)
PO2 BLDA: 89 MM HG (ref 75–129)
PO2 BLDA: 90.4 MM HG (ref 75–129)
PO2 BLDA: 93.4 MM HG (ref 75–129)
POTASSIUM SERPL-SCNC: 3.8 MMOL/L (ref 3.5–5.3)
PROCALCITONIN SERPL-MCNC: 0.58 NG/ML
PROT SERPL-MCNC: 6 G/DL (ref 6.4–8.4)
RBC # BLD AUTO: 3.8 MILLION/UL (ref 3.88–5.62)
SODIUM SERPL-SCNC: 131 MMOL/L (ref 135–147)
SPECIMEN SOURCE: ABNORMAL
TOTAL CELLS COUNTED SPEC: 100
TSH SERPL DL<=0.05 MIU/L-ACNC: 2.56 UIU/ML (ref 0.45–4.5)
URATE SERPL-MCNC: 5.6 MG/DL (ref 3.5–8.5)
VENT AC: 14
VENT AC: 16
VENT AC: 16
VENT- AC: AC
VT SETTING VENT: 500 ML
VT SETTING VENT: 550 ML
VT SETTING VENT: 550 ML
WBC # BLD AUTO: 9.73 THOUSAND/UL (ref 4.31–10.16)

## 2023-08-25 PROCEDURE — 94640 AIRWAY INHALATION TREATMENT: CPT

## 2023-08-25 PROCEDURE — 0BCB8ZZ EXTIRPATION OF MATTER FROM LEFT LOWER LOBE BRONCHUS, VIA NATURAL OR ARTIFICIAL OPENING ENDOSCOPIC: ICD-10-PCS | Performed by: ANESTHESIOLOGY

## 2023-08-25 PROCEDURE — 85730 THROMBOPLASTIN TIME PARTIAL: CPT | Performed by: ANESTHESIOLOGY

## 2023-08-25 PROCEDURE — 71045 X-RAY EXAM CHEST 1 VIEW: CPT

## 2023-08-25 PROCEDURE — 95812 EEG 41-60 MINUTES: CPT | Performed by: PSYCHIATRY & NEUROLOGY

## 2023-08-25 PROCEDURE — 94669 MECHANICAL CHEST WALL OSCILL: CPT

## 2023-08-25 PROCEDURE — 31622 DX BRONCHOSCOPE/WASH: CPT | Performed by: ANESTHESIOLOGY

## 2023-08-25 PROCEDURE — 83735 ASSAY OF MAGNESIUM: CPT | Performed by: NURSE PRACTITIONER

## 2023-08-25 PROCEDURE — 74018 RADEX ABDOMEN 1 VIEW: CPT

## 2023-08-25 PROCEDURE — 82330 ASSAY OF CALCIUM: CPT | Performed by: NURSE PRACTITIONER

## 2023-08-25 PROCEDURE — 89051 BODY FLUID CELL COUNT: CPT | Performed by: ANESTHESIOLOGY

## 2023-08-25 PROCEDURE — 99232 SBSQ HOSP IP/OBS MODERATE 35: CPT | Performed by: PSYCHIATRY & NEUROLOGY

## 2023-08-25 PROCEDURE — 94150 VITAL CAPACITY TEST: CPT

## 2023-08-25 PROCEDURE — 94760 N-INVAS EAR/PLS OXIMETRY 1: CPT

## 2023-08-25 PROCEDURE — 82805 BLOOD GASES W/O2 SATURATION: CPT | Performed by: NURSE PRACTITIONER

## 2023-08-25 PROCEDURE — 84145 PROCALCITONIN (PCT): CPT | Performed by: NURSE PRACTITIONER

## 2023-08-25 PROCEDURE — 99291 CRITICAL CARE FIRST HOUR: CPT | Performed by: ANESTHESIOLOGY

## 2023-08-25 PROCEDURE — 94003 VENT MGMT INPAT SUBQ DAY: CPT

## 2023-08-25 PROCEDURE — 95816 EEG AWAKE AND DROWSY: CPT

## 2023-08-25 PROCEDURE — 94668 MNPJ CHEST WALL SBSQ: CPT

## 2023-08-25 PROCEDURE — 85025 COMPLETE CBC W/AUTO DIFF WBC: CPT | Performed by: NURSE PRACTITIONER

## 2023-08-25 PROCEDURE — 93306 TTE W/DOPPLER COMPLETE: CPT | Performed by: INTERNAL MEDICINE

## 2023-08-25 PROCEDURE — 84443 ASSAY THYROID STIM HORMONE: CPT | Performed by: NURSE PRACTITIONER

## 2023-08-25 PROCEDURE — 84550 ASSAY OF BLOOD/URIC ACID: CPT | Performed by: NURSE PRACTITIONER

## 2023-08-25 PROCEDURE — 80053 COMPREHEN METABOLIC PANEL: CPT | Performed by: NURSE PRACTITIONER

## 2023-08-25 PROCEDURE — 93306 TTE W/DOPPLER COMPLETE: CPT

## 2023-08-25 RX ORDER — MAGNESIUM SULFATE HEPTAHYDRATE 40 MG/ML
2 INJECTION, SOLUTION INTRAVENOUS ONCE
Status: COMPLETED | OUTPATIENT
Start: 2023-08-25 | End: 2023-08-25

## 2023-08-25 RX ORDER — PROPOFOL 10 MG/ML
5-50 INJECTION, EMULSION INTRAVENOUS
Status: DISCONTINUED | OUTPATIENT
Start: 2023-08-25 | End: 2023-08-27

## 2023-08-25 RX ADMIN — PROPOFOL 10 MCG/KG/MIN: 10 INJECTION, EMULSION INTRAVENOUS at 16:15

## 2023-08-25 RX ADMIN — PIPERACILLIN AND TAZOBACTAM 3.38 G: 36; 4.5 INJECTION, POWDER, FOR SOLUTION INTRAVENOUS at 22:51

## 2023-08-25 RX ADMIN — SODIUM CHLORIDE SOLN NEBU 3% 4 ML: 3 NEBU SOLN at 01:18

## 2023-08-25 RX ADMIN — PROPOFOL 10 MCG/KG/MIN: 10 INJECTION, EMULSION INTRAVENOUS at 10:02

## 2023-08-25 RX ADMIN — VANCOMYCIN HYDROCHLORIDE 1250 MG: 10 INJECTION, POWDER, LYOPHILIZED, FOR SOLUTION INTRAVENOUS at 20:14

## 2023-08-25 RX ADMIN — SODIUM CHLORIDE SOLN NEBU 3% 4 ML: 3 NEBU SOLN at 07:40

## 2023-08-25 RX ADMIN — CHLORHEXIDINE GLUCONATE 15 ML: 1.2 RINSE ORAL at 20:14

## 2023-08-25 RX ADMIN — VANCOMYCIN HYDROCHLORIDE 1250 MG: 10 INJECTION, POWDER, LYOPHILIZED, FOR SOLUTION INTRAVENOUS at 08:37

## 2023-08-25 RX ADMIN — PIPERACILLIN AND TAZOBACTAM 3.38 G: 36; 4.5 INJECTION, POWDER, FOR SOLUTION INTRAVENOUS at 10:57

## 2023-08-25 RX ADMIN — PROPOFOL 10 MCG/KG/MIN: 10 INJECTION, EMULSION INTRAVENOUS at 00:27

## 2023-08-25 RX ADMIN — Medication 20 MG: at 11:57

## 2023-08-25 RX ADMIN — THIAMINE HYDROCHLORIDE 500 MG: 100 INJECTION, SOLUTION INTRAMUSCULAR; INTRAVENOUS at 13:19

## 2023-08-25 RX ADMIN — CEFEPIME 2000 MG: 2 INJECTION, POWDER, FOR SOLUTION INTRAVENOUS at 08:03

## 2023-08-25 RX ADMIN — CHLORHEXIDINE GLUCONATE 15 ML: 1.2 RINSE ORAL at 08:04

## 2023-08-25 RX ADMIN — SODIUM CHLORIDE SOLN NEBU 3% 4 ML: 3 NEBU SOLN at 19:19

## 2023-08-25 RX ADMIN — FLUOXETINE 10 MG: 10 CAPSULE ORAL at 08:47

## 2023-08-25 RX ADMIN — FENTANYL CITRATE 50 MCG/HR: 0.05 INJECTION, SOLUTION INTRAMUSCULAR; INTRAVENOUS at 08:04

## 2023-08-25 RX ADMIN — SODIUM CHLORIDE SOLN NEBU 3% 4 ML: 3 NEBU SOLN at 13:03

## 2023-08-25 RX ADMIN — THIAMINE HYDROCHLORIDE 500 MG: 100 INJECTION, SOLUTION INTRAMUSCULAR; INTRAVENOUS at 20:14

## 2023-08-25 RX ADMIN — FENTANYL CITRATE 50 MCG: 50 INJECTION, SOLUTION INTRAMUSCULAR; INTRAVENOUS at 00:09

## 2023-08-25 RX ADMIN — MAGNESIUM SULFATE HEPTAHYDRATE 2 G: 40 INJECTION, SOLUTION INTRAVENOUS at 11:45

## 2023-08-25 RX ADMIN — PIPERACILLIN AND TAZOBACTAM 3.38 G: 36; 4.5 INJECTION, POWDER, FOR SOLUTION INTRAVENOUS at 16:52

## 2023-08-25 RX ADMIN — HEPARIN SODIUM 9 UNITS/KG/HR: 10000 INJECTION, SOLUTION INTRAVENOUS at 13:21

## 2023-08-25 RX ADMIN — THIAMINE HYDROCHLORIDE 500 MG: 100 INJECTION, SOLUTION INTRAMUSCULAR; INTRAVENOUS at 03:33

## 2023-08-25 RX ADMIN — NOREPINEPHRINE BITARTRATE 2 MCG/MIN: 1 SOLUTION INTRAVENOUS at 21:22

## 2023-08-25 RX ADMIN — FENTANYL CITRATE 50 MCG: 50 INJECTION, SOLUTION INTRAMUSCULAR; INTRAVENOUS at 15:58

## 2023-08-25 NOTE — RESPIRATORY THERAPY NOTE
Received patient on AC/VC 14/500/100% +14 tolerating therapy. Restraints secure. Bed therapy performed, and breathing treatment given.

## 2023-08-25 NOTE — ASSESSMENT & PLAN NOTE
· Hx of hypertension with evidence of LVH on echocardiogram  · PTA meds: demadex only  · Hypotension s/p intubation

## 2023-08-25 NOTE — PROGRESS NOTES
Gayathri Nguyễn is a 41 y.o. male who is currently ordered Vancomycin IV with management by the Pharmacy Consult service. Relevant clinical data and objective / subjective history reviewed. Vancomycin Assessment:  Indication and Goal AUC/Trough: Pneumonia (goal -600, trough >10), -600, trough >10  Clinical Status:   Micro:   pending  Renal Function:  SCr: 0.66 mg/dL  CrCl: 236.6 mL/min  Renal replacement: Not on dialysis  Days of Therapy: 2  Current Dose: 1500mg IV q12h  Vancomycin Plan:  New Dosinmg IV q12h  Estimated AUC: 455 mcg*hr/mL  Estimated Trough: 15.1 mcg/mL  Next Level: 23 @0600  Renal Function Monitoring: Daily BMP and UOP  Pharmacy will continue to follow closely for s/sx of nephrotoxicity, infusion reactions and appropriateness of therapy. BMP and CBC will be ordered per protocol. We will continue to follow the patient’s culture results and clinical progress daily.     Richard Curran, Pharmacist

## 2023-08-25 NOTE — PROGRESS NOTES
Recommend Jevity 1.2 at goal rate of 70 mL/hr for a total volume of 1680 mL. This will provide 2016 kcals (2246 with current propofol), 93 gms protein and 1356 ml free water. Recommend 1 packet of Prosource per day for a total kcal intake of 2306 and 108 gms Protein. Recommend flushes of 140 mL q 4 hrs (35 mL/hr) for a total fluid intake of 2196 mL.

## 2023-08-25 NOTE — UTILIZATION REVIEW
NOTIFICATION OF ADMISSION DISCHARGE   This is a Notification of Discharge from [address] [place] [address]. Please be advised that this patient has been discharge from our facility. Below you will find the admission and discharge date and time including the patient’s disposition. UTILIZATION REVIEW CONTACT:  Heather Bautista  Utilization   Network Utilization Review Department  Phone: [phone] x carefully listen to the prompts. All voicemails are confidential.  Email: [email or web address]     ADMISSION INFORMATION  PRESENTATION DATE: 8/23/2023  2:06 PM  OBERVATION ADMISSION DATE:   INPATIENT ADMISSION DATE: 8/23/23  5:12 PM   DISCHARGE DATE: 8/24/2023  6:22 PM   DISPOSITION:ED Dismiss - Never Arrived    IMPORTANT INFORMATION:  Send all requests for admission clinical reviews, approved or denied determinations and any other requests to dedicated fax number below belonging to the campus where the patient is receiving treatment.  List of dedicated fax numbers:  [place] DENIALS (Administrative/Medical Necessity) [phone] [address] [facility] (Maternity/NICU/Pediatrics) [phone]   [facility] [phone]   [facility] [phone] [address] [phone]   [address] [place] [address] [phone]   [facility] [phone]   [address] [place] [address] [address] [place] [address] [phone]   [address] [place] [address] [phone]   [address] [facility] [phone]   [address] [facility] [address] [facility] [phone]

## 2023-08-25 NOTE — ASSESSMENT & PLAN NOTE
Patient presented with hypoxia, found to have right lower lobe segmental PE. Initially saturating well on nasal cannula. Rapid response was called 8/24 secondary to hypoxia and altered mental status requiring emergent intubation. Hypoxic post intubation requiring high ventilator settings. · CTA PE study 8/23: Pulmonary embolism in the right lower lobe segmental pulmonary artery. Measured RV/LV ratio is within normal limits at less than 0.9.  · Initial CXR post intubation: Near complete opacity of the left lung likely related to worsening infiltrate and/or atelectasis. Patchy right upper lobe infiltrates. · Given rapid onset of acute hypoxia and encephalopathy, concern for possible aspiration event   · On arrival to [facility], episode of hypoxia with sp02 60s despite 100% Fio2 requiring sedation, suctioning for thick secretions  · Repeat CXR on arrival to [facility]: improved aeration on the left side, however continued left lower lobe infiltrate vs atelectasis. Possible central congestion  · Strep pneumo/legionella urine antigen negative  · COVID/flu/RSV negative    Plan:  · ACVC 16x550, 100, 14   · PF ratio 50 overnight, proning deferred at that time due to body habitus and to preserve neurologic monitoring.  PEEP then increased from 12->14 and sedation increased (fent and propofol)  · Peak pressures 29, plat 26, Static compliance 49  · PF ratio up to 91 this AM   · Wean Fio2 for goal Sp02 >90  · Q4 hr ABG  · Hypertonic saline nebs given thick secretions  · Chest PT  · Empiric antibiotics with cefepime/vancomycin for possible pneumonia  · Antibiotic day #2  · Consider diuretics for possible component of volume overload  · Will check KUB, abdomen appears firm and could be contributing to compressive atelectasis   · Pulmonary hygiene  · VAP ppx

## 2023-08-25 NOTE — ASSESSMENT & PLAN NOTE
· Testicular cancer s/p left radial orchiectomy on 12/2022. Repeat CT scan in January 2023 with enlarging lymph node. Received Etoposide and cistoplatin chemotherapy in March, developed numbness/tingling and vision changes, labile affect and right sided weakness and therefore chemotherapy was stopped.  MRI demonstrated white matter changes at that time and he was referred to Neurology   · Continue outpatient follow up with Heme/Onc

## 2023-08-25 NOTE — ASSESSMENT & PLAN NOTE
Sepsis present on arrival to [facility] by leukocytosis, temp 96.4 with worsening hypoxia requiring intubation. · Thick, yellow/white secretions suctioned via ETT on arrival  · CXR with concern for left lower lobe infiltrate. Patient does have aspiration risk factors with encephalopathy  · Sputum culture, pending  · Strep pneumo/legionella urine antigens negative  · COVID/flu/RSV negative  · Blood cultures pending  · UA unremarkable   · LA 1  · procal 0.37  · LP on previous admission negative, low utility for repeating LP at this time   · Patient hypotensive on arrival, but likely iatrogenic seconadary to sedation use. Levophed now off.  Will defer fluids given profound hypoxia on high ventilator settings, possible component of volume overload on CXR    Plan:  · Initiate empiric cefepime/vanco  · Follow culture data  · Trend WBC, fever curve and procal

## 2023-08-25 NOTE — SEPSIS NOTE
Sepsis Note   Yenni Santiago 41 y.o. male MRN: [id number]  Unit/Bed#: ICU 03 Encounter: [id number]       Initial Sepsis Screening     Row Name 08/24/23 2134                Is the patient's history suggestive of a new or worsening infection? Yes (Proceed)  -MS        Suspected source of infection pneumonia  -MS        Indicate SIRS criteria Tachypnea > 20 resp per min;Leukocytosis (WBC > 29569 IJL) OR Leukopenia (WBC <4000 IJL) OR Bandemia (WBC >10% bands)  -MS        Are two or more of the above signs & symptoms of infection both present and new to the patient? Yes (Proceed)  -MS        Assess for evidence of organ dysfunction: Are any of the below criteria present within 6 hours of suspected infection and SIRS criteria that are NOT considered to be chronic conditions? --        Date of presentation of septic shock --        Time of presentation of septic shock --        Fluid Resuscitation: --              User Key  (r) = Recorded By, (t) = Taken By, (c) = Cosigned By    Initials Name Provider Type    MS Rosa Alvarez, [place] Nurse Practitioner                Hypotension present likely iatrogenic in the setting of sedation. Patient profoundly hypoxic with history of volume overload and therefore fluids deferred at this time. LA 1. Low dose levophed ordered, again likely secondary to sedation. Body mass index is 38.91 kg/m².   Wt Readings from Last 1 Encounters:   08/24/23 (!) 145 kg (319 lb 10.7 oz)     IBW (Ideal Body Weight): 86.8 kg    Ideal body weight: 86.8 kg (191 lb 5.7 oz)  Adjusted ideal body weight: 110 kg (242 lb 10.9 oz)

## 2023-08-25 NOTE — ASSESSMENT & PLAN NOTE
Sepsis present on arrival to [facility] by leukocytosis, temp 96.4 with worsening hypoxia requiring intubation. · Thick, yellow/white secretions suctioned via ETT on arrival  · CXR with concern for left lower lobe infiltrate. Patient does have aspiration risk factors with encephalopathy  · Sputum culture, strep pneumo/legionella urine antigens and COVID/flu/RSV ordered  · Blood cultures sent  · UA with reflex  · LA 1  · procal 0.37  · LP on previous admission negative, low utility for repeating LP at this time   · Patient hypotensive on arrival, but likely iatrogenic seconadary to sedation use. Currently on low dose levophed.  Will defer fluids given profound hypoxia on high ventilator settings, possible component of volume overload on CXR    Plan:  · Initiate empiric cefepime/vanco  · Follow culture data  · Trend WBC, fever curve and procal

## 2023-08-25 NOTE — ASSESSMENT & PLAN NOTE
· Previous echocardiograms demonstrate EF 26%, normal diastolic dysfunction, mild left ventricular hypertrophy, mild RVSP elevation and mild TR  · PTA: demadex 20mg daily  · Evaluated by Heart Failure team in July of this year.  LVH thought to be secondary to hypertension, obesity, hx of meth use  · Recommended sleep study at that time   · Echo pending

## 2023-08-25 NOTE — PROCEDURES
Arterial Line Insertion    Date/Time: 8/24/2023 8:23 PM    Performed by: Sai Kimball, [facility] by: Sai Kimball, [address] [facility]    Patient location:  ICU  Consent:     Consent obtained:  Emergent situation  Universal protocol:     Patient identity confirmed:  Arm band and hospital-assigned identification number  Indications:     Indications: hemodynamic monitoring and multiple ABGs    Pre-procedure details:     Skin preparation:  Chlorhexidine    Preparation: Patient was prepped and draped in sterile fashion    Procedure details:     Location / Tip of Catheter:  Radial    Laterality:  Left    Needle gauge:  20 G    Placement technique:  Percutaneous    Number of attempts:  2 (first attempt on right unsuccessful.  first attempt on left successful )    Successful placement: yes      Transducer: waveform confirmed    Post-procedure details:     Post-procedure:  Biopatch applied, secured with tape, sterile dressing applied and sutured    CMS:  Unable to assess

## 2023-08-25 NOTE — ASSESSMENT & PLAN NOTE
· Sodium 124 on admission, now up to 128 s/p NSS infusion at 50mL/hr  · 8/23: serum osmo 267, urine osmo 335, urine sodium 40 (on demadex PTA)  · Hold NSS for now  · Repeat BMP now  · Continue to closely trend BMP  · Goal NA: 136 by tomorrow AM

## 2023-08-25 NOTE — ASSESSMENT & PLAN NOTE
Patient presented to [facility] on 8/23 with hypoxia and encephalopathy. Today, had acute change in GCS prompting rapid response and subsequent intubation for airway protection. Of note, recent hospitalization 8/11-17 for encephalopathy with unclear etiology despite extensive workup but Wernicke's encephalopathy on the differential  · [facility] hospitalization notable for rapid response and intubation on 8/12 for airway protection with extubation on 8/13. Treated with high dose thiamine. Evaluated by psychiatry for depressed mood, possible bipolar disorder. Discharged to home with his sister with visiting nurses, speech therapy, PT/OT  · [place] workup  · CT head 8/11: No acute intracranial abnormality. Stable small bilateral subcortical hypoattenuating foci. No associated mass effect. · MRI 8/12: No acute intracranial abnormality. Stable nonspecific enhancement along the anterior floor of the third ventricle/hypothalamus when comparing to the March 15, 2023 study. Differential includes chronic toxic metabolic insult (thiamine deficiency), chronic inflammatory/ infectious conditions. Other considerations include Langerhans cell cell histiocytosis, lymphocytic hypophysitis, and neurosyphilis. Primary CNS lymphoma is a less likely consideration, particularly given the lack of interval change. Serologic correlation is recommended. Stable cerebral white matter signal abnormality, presumably the sequela of chronic microangiopathic disease given the patient's history  · VEEG: negative for seizure activity   · LP: meningitis/encephalitis panel negative, culture negative, lyme and cryptococcal negative, paraneoplastic and autoimmune CSF studies still pending (sent to [facility])  · 8/24: patient's girlfriend visited [facility] and found him unresponsive. Notes said that 'eyes were rolled back with secretions around his mouth". Rapid response was called and he was intubated for hypoxia and low GCS. VBG ?  Post intubation 7. 25/76/59/33  · CT head 8/24: No acute intracranial abnormality. · On arrival to [facility], patient sedated on fentanyl, agitated and moving all extremities. Was able to follow commands with extremities prior to fentanyl bolus. Plan:  · Encephalopathy likely multifactorial in the setting of abnormal (however stable) MRI showing possible wernicke's encephalopathy, hypercarbia, hyponatremia  · Recheck ABG to assess for hypercarbia  · Recommend BiPAP vs CPAP HS while inpatient once extubated.  Patient would benefit from outpatient PFTs and possible CPAP HS  · Will increase thiamine dose to 500mg Q8   · Spot EEG today, consideration for transfer to [facility] for cEEG if positive  · Follow up previous LP send out studies, low utility for repeat LP  · Neurology consult, appreciate recommendations  · Hold all sedating meds  · CAM ICU  · Sleep hygiene  · Neurochecks per routine

## 2023-08-25 NOTE — PROGRESS NOTES
Jil Burt is a 41 y.o. male who is currently ordered Vancomycin IV with management by the Pharmacy Consult service. Relevant clinical data and objective / subjective history reviewed. Vancomycin Assessment:  Indication and Goal AUC/Trough: Pneumonia (goal -600, trough >10)  Clinical Status: new  Micro: pending    Renal Function:  SCr: 0.64 mg/dL  CrCl: 248.3 mL/min  Renal replacement: Not on dialysis  Days of Therapy: 1  Current Dose: 2000mg IV once  Vancomycin Plan:  New Dosinmg IV q12h  Estimated AUC: 533 mcg*hr/mL  Estimated Trough: 17.8 mcg/mL  Next Level: 23 @ 0600  Renal Function Monitoring: Daily BMP and [place] will continue to follow closely for s/sx of nephrotoxicity, infusion reactions and appropriateness of therapy. BMP and CBC will be ordered per protocol. We will continue to follow the patient’s culture results and clinical progress daily.     Brittany Gibbs, Pharmacist

## 2023-08-25 NOTE — ASSESSMENT & PLAN NOTE
Presented with hypoxia, initially admitted to the floor on supplemental NC  · CTA PE: Pulmonary embolism in the right lower lobe segmental pulmonary artery. Measured RV/LV ratio is within normal limits at less than 0.9.   · BNP 9, initial HS troponin 9  · Initiated on heparin gtt  · Developed worsening hypoxia 8/24, rapid response was called and patient was intubated for airway protection and hypoxic  · Now requiring high vent settings    Plan:  · Continue heparin gtt  · Echocardiogram ordered  · Consider venous duplex

## 2023-08-25 NOTE — ASSESSMENT & PLAN NOTE
· Sodium 124 on admission, now up to 128 s/p NSS infusion at 50mL/hr  · 8/23: serum osmo 267, urine osmo 335, urine sodium 40 (on demadex PTA)  · Hold NSS for now  · Repeat BMP with sodium 129  · Continue to closely trend BMP

## 2023-08-25 NOTE — UTILIZATION REVIEW
Initial Clinical Review  TRANSFER FROM [facility]    Admission: Date/Time/Statement:   Admission Orders (From admission, onward)     Ordered        08/24/23 1833  Inpatient Admission  Once                      Orders Placed This Encounter   Procedures   • Inpatient Admission     Standing Status:   Standing     Number of Occurrences:   1     Order Specific Question:   Level of Care     Answer:   Critical Care [15]     Order Specific Question:   Estimated length of stay     Answer:   More than 2 Midnights     Order Specific Question:   Certification     Answer:   I certify that inpatient services are medically necessary for this patient for a duration of greater than two midnights. See H&P and MD Progress Notes for additional information about the patient's course of treatment. Initial Presentation: 41 y.o. male initially  Admitted to [facility] on  8/23  With Encephalopathy, Acute  PE and acute hypoxic respiratory failure  And intubated. Incidentally,  Was also admitted at  [facility]  8/11 - 8/17  For encephalopathy and required intubation for low  GCS, unclear etiology. Transferring to  [facility]  For further care. Hypoxemia  Markedly worsened the day of transfer. Found in respiratory distress and emergently intubated. Met sepsis criteria with leukocytosis, temp  96.4 and worsening  Hypoxia. Hypotensive on arrival.    PMH  Is  Metastatic carcinoma of  Left testes, S/P  Orchiectomy and chemo,  Discontinued D/T intolerance,  Heart failure, HTN,  Hypoventilation syndrome, remote alcohol/meth use. Admit  Ip ICU LOC  With  Acute/chronic hypoxemic and hypercapnic respiratory failure,  PE, Pneumonia  And plan is  Adding propofol,  Fentanyl drip,   IV  Heparin, ENDY, neuro consult, IV  MVI,  TTE,   Vent support,  Neuro checks,   And monitor labs. Date:     8/25         Day 2:   Remains intubated. Fentanyl/levophed  d/c  This am.  Continue  Propofol. Remain on  IV heparin. Continue  IV  MVI. Increased sedation  Overnight with addition of low dose propofol. Some mild agitation. Continue  ENDY. Large amount yellow drainage in  OG  Tube. Thick secretions noted  Via  ETT. Start tube feeds. TYracks with eyes  When stimulated. Continue current meds/treatment plan.          Wt Readings from Last 1 Encounters:   08/25/23 (!) 141 kg (311 lb)     Additional Vital Signs:   /25/23 0200 97.7 °F (36.5 °C) 79 16 -- -- 92/56 67 mmHg 100 % -- -- --   08/25/23 0130 98.1 °F (36.7 °C) 80 16 -- -- 92/57 68 mmHg 99 % -- -- --   08/25/23 0119 -- -- -- -- -- -- -- 99 % 100 Ventilator --   08/25/23 0100 97.9 °F (36.6 °C) 80 16 -- -- 100/57 70 mmHg 100 % -- -- --   08/25/23 0045 97.7 °F (36.5 °C) 88 16 -- -- 90/54 65 mmHg 98 % -- -- --   08/25/23 0030 97.9 °F (36.6 °C) 91 16 -- -- 96/58 70 mmHg 97 % -- -- --   08/25/23 0015 97.9 °F (36.6 °C) 81 22 -- -- 125/71 87 mmHg 100 % -- -- --   08/25/23 0000 97.7 °F (36.5 °C) 78 16 106/71 84 122/68 83 mmHg 97 % -- -- --   08/24/23 2345 97.7 °F (36.5 °C) 72 21 -- -- 105/55 69 mmHg 97 % -- -- --   08/24/23 2330 -- 79 16 -- -- 113/64 77 mmHg 99 % 100 Ventilator --   08/24/23 2315 -- 79 17 -- -- 114/62 76 mmHg 97 % -- -- --   08/24/23 2300 -- 79 16 -- -- 110/60 73 mmHg 98 % -- -- --   08/24/23 2245 -- 81 16 -- -- 113/60 75 mmHg 99 % -- -- --   08/24/23 2230 -- 81 16 -- -- 112/60 74 mmHg 99 % -- -- --   08/24/23 2215 -- 82 16 -- -- 113/59 73 mmHg 98 % -- -- --   08/24/23 2200 -- 92 17 -- -- 108/57 70 mmHg 97 % -- -- --   08/24/23 2145 -- 86 16 108/70 84 112/58 72 mmHg 100 % -- -- --   08/24/23 2130 -- 85 16 -- -- 139/74 93 mmHg 100 % -- -- --   08/24/23 2115 -- 78 16 -- -- 134/72 90 mmHg 95 % -- -- --   08/24/23 2100 -- 83 16 -- -- 119/61 76 mmHg 96 % -- -- --   08/24/23 2058 -- -- -- -- -- -- -- 95 % 100 Ventilator --   08/24/23 2045 97 °F (36.1 °C) Abnormal  73 16 -- -- 134/73 90 mmHg 94 % -- -- --   08/24/23 2030 97 °F (36.1 °C) Abnormal  79 16 -- -- 118/59 76 mmHg 92 % -- -- -- 08/24/23 2000 96.8 °F (36 °C) Abnormal  -- -- -- -- -- -- -- -- -- --   08/24/23 1945 96.8 °F (36 °C) Abnormal  79 16 114/82 93 -- -- 97 % -- -- --   08/24/23 1930 96.8 °F (36 °C) Abnormal  86 16 111/77 90 -- -- 97 % -- -- --   08/24/23 1915 96.8 °F (36 °C) Abnormal  90 16 108/74 87 -- -- 96 % -- -- --   08/24/23 1900 96.8 °F (36 °C) Abnormal  81 16 122/84 100 -- -- 95 % -- -- --   08/24/23 1841 -- -- -- -- -- -- -- 91 % -- -- --   08/24/23 1835 96.4 °F (35.8 °C) Abnormal  86 16 105/65 80 -- -- 87 % Abnormal  100 Ventilator Lying   08/24/23 1832 96.4 °F (35.8 °C) Abnormal  79 16 117/55 79 -- -- 77 % Abnormal  -- -- --   08/24/23 1820 -- -- -- -- -- -- -- 72 % Abnormal           Pertinent Labs/Diagnostic Test Results:   XR abdomen 1 view kub   Final Result by Brandon Jackson MD (08/25 3608)      Nonspecific bowel gas pattern. Endogastric tube as noted. Workstation performed: [id number]         XR chest portable   Final Result by Brandon Jackson MD (47/37 6345)         1. Continued low lung volumes with bibasilar atelectasis and small effusions. 2. Right upper lobe opacification likely due to atelectasis and/or loculated pleural fluid. 3. Lines and tubes as noted. Workstation performed: [id number]         XR chest portable ICU   Final Result by Slade Damon MD (08/25 1680)      New right upper lobe opacification, which may represent atelectasis or pneumonia. Improved linear left midlung atelectasis with stable bilateral lower lung airspace opacities.             Workstation performed: [id number]           Results from last 7 days   Lab Units 08/24/23  2138   SARS-COV-2  Negative     Results from last 7 days   Lab Units 08/25/23  0600 08/24/23 2024 08/23/23  1435   WBC Thousand/uL 9.73 15.38* 10.11   HEMOGLOBIN g/dL 12.1 13.0 14.3   HEMATOCRIT % 36.4* 38.5 41.6   PLATELETS Thousands/uL 249 301 358   NEUTROS ABS Thousands/µL 7.97*  --  7.95*         Results from last 7 days   Lab Units 08/25/23  0600 08/24/23 2024 08/24/23  0834 08/24/23  0553 08/23/23  2312   SODIUM mmol/L 131* 129* 128* 127* 126*  126*   POTASSIUM mmol/L 3.8 3.5 3.7 3.6 3.5  3.5   CHLORIDE mmol/L 88* 86* 83* 83* 80*  81*   CO2 mmol/L 38* 35* 42* 42* 41*  38*   ANION GAP mmol/L 5 8 3 2 5  7   BUN mg/dL 8 8 7 7 8  8   CREATININE mg/dL 0.66 0.59* 0.64 0.62 0.80  0.75   EGFR ml/min/1.73sq m 124 130 125 127 114  118   CALCIUM mg/dL 8.8 8.9 8.5 8.4 8.1*  8.2*   CALCIUM, IONIZED mmol/L 1.08*  --   --   --   --    MAGNESIUM mg/dL 1.8*  --   --   --  2.3     Results from last 7 days   Lab Units 08/25/23  0600 08/23/23  1435   AST U/L 33 29   ALT U/L 74* 73*   ALK PHOS U/L 113* 123*   TOTAL PROTEIN g/dL 6.0* 7.5   ALBUMIN g/dL 3.3* 4.2   TOTAL BILIRUBIN mg/dL 0.53 0.52     Results from last 7 days   Lab Units 08/24/23  1357   POC GLUCOSE mg/dl 208*     Results from last 7 days   Lab Units 08/25/23  0600 08/24/23 2024 08/24/23  0834 08/24/23  0553 08/23/23  2312 08/23/23  1435   GLUCOSE RANDOM mg/dL 107 112 97 102 111  109 130     Results from last 7 days   Lab Units 08/23/23  2312   OSMOLALITY, SERUM mmol/*      Results from last 7 days   Lab Units 08/25/23  1220 08/25/23  0811 08/25/23  0356   PH ART  7.425 7.412 7.519*   PCO2 ART mm Hg 52.1* 58.2* 43.4   PO2 ART mm Hg 89.0 90.4 93.4   HCO3 ART mmol/L 33.4* 36.2* 34.6*   BASE EXC ART mmol/L 7.7 9.7 10.6   O2 CONTENT ART mL/dL 16.3 17.1 17.3   O2 HGB, ARTERIAL % 96.6 96.1 97.0   ABG SOURCE  Line, Arterial Line, Arterial Line, Arterial     Results from last 7 days   Lab Units 08/24/23  1534   PH SYLVESTER  7.255*   PCO2 SYLVESTER mm Hg 76.1*   PO2 SYLVESTER mm Hg 59.8*   HCO3 SYLVESTER mmol/L 33.0*   BASE EXC SYLVESTER mmol/L 3.4   O2 CONTENT SYLVESTER ml/dL 17.0   O2 HGB, VENOUS % 87.0*                 Results from last 7 days   Lab Units 08/23/23  1435   D-DIMER QUANTITATIVE ug/ml FEU 1.11*     Results from last 7 days   Lab Units 08/25/23  1220 08/25/23  0600 08/24/23  1911 08/24/23  0038 08/23/23  1435   PROTIME seconds  --   --   --   --  13.1   INR   --   --   --   --  0.96   PTT seconds 99* 104* 104*   < > 28    < > = values in this interval not displayed. Results from last 7 days   Lab Units 08/25/23  0600   TSH 3RD GENERATON uIU/mL 2.563     Results from last 7 days   Lab Units 08/25/23  0600 08/24/23 2024   PROCALCITONIN ng/ml 0.58* 0.37*     Results from last 7 days   Lab Units 08/24/23 2024 08/23/23  1444   LACTIC ACID mmol/L 1.0 1.9             Results from last 7 days   Lab Units 08/23/23 1435   BNP pg/mL 9               Results from last 7 days   Lab Units 08/23/23 2329 08/23/23  2312   OSMOLALITY, SERUM mmol/KG  --  267*   OSMO UR mmol/  --      Results from last 7 days   Lab Units 08/24/23 2012 08/23/23 2329   CLARITY UA  Clear  --    COLOR UA  Yellow  --    SPEC GRAV UA  >=1.030  --    PH UA  6.0  --    GLUCOSE UA mg/dl Negative  --    KETONES UA mg/dl Negative  --    BLOOD UA  Negative  --    PROTEIN UA mg/dl 30 (1+)*  --    NITRITE UA  Negative  --    BILIRUBIN UA  Negative  --    UROBILINOGEN UA E.U./dl 1.0  --    LEUKOCYTES UA  Negative  --    WBC UA /hpf 10-20*  --    RBC UA /hpf 0-1  --    BACTERIA UA /hpf Occasional  --    EPITHELIAL CELLS WET PREP /hpf Occasional  --    MUCUS THREADS  Moderate*  --    SODIUM UR   --  40     Results from last 7 days   Lab Units 08/24/23 2138 08/24/23 2013   STREP PNEUMONIAE ANTIGEN, URINE   --  Negative   LEGIONELLA URINARY ANTIGEN   --  Negative   INFLUENZA A PCR  Negative  --    INFLUENZA B PCR  Negative  --    RSV PCR  Negative  --                              Results from last 7 days   Lab Units 08/24/23 2110 08/24/23 2029 08/24/23 2025 08/23/23  1444 08/23/23  1435   BLOOD CULTURE   --  Received in Microbiology Lab. Culture in Progress. Received in Microbiology Lab. Culture in Progress. No Growth at 24 hrs. No Growth at 24 hrs.    GRAM STAIN RESULT  1+ Epithelial cells per low power field*  3+ Polys*  1+ Gram positive cocci in pairs*  --   --   --   --              Present on Admission:  • Encephalopathy  • Umbilical hernia without obstruction and without gangrene  • 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HCC)  • Acute pulmonary embolism without acute cor pulmonale (HCC)  • Hypertension  • Depression  • Hyponatremia  • LVH (left ventricular hypertrophy)      Admitting Diagnosis: Acute pulmonary embolism without acute cor pulmonale (HCC)  Age/Sex: 41 y.o. male  Admission Orders:  Scheduled Medications:  chlorhexidine, 15 mL, Mouth/Throat, Q12H [address]  FLUoxetine, 10 mg, Oral, Daily  omeprazole (PRILOSEC) suspension 2 mg/mL, 20 mg, Oral, Daily  piperacillin-tazobactam, 3.375 g, Intravenous, Q6H  sodium chloride, 4 mL, Nebulization, Q6H  thiamine, 500 mg, Intravenous, Q8H  vancomycin, 1,250 mg, Intravenous, Q12H      Continuous IV Infusions:  heparin (porcine), 3-30 Units/kg/hr (Order-Specific), Intravenous, Titrated  propofol, 5-50 mcg/kg/min, Intravenous, Titrated  Fentanyl  Infusion - d/c  8/25  Levophed - d/c  8/25      PRN Meds:  fentanyl citrate (PF), 50 mcg, Intravenous, Q1H PRN  heparin (porcine), 10,000 Units, Intravenous, Q6H PRN  heparin (porcine), 5,000 Units, Intravenous, Q6H PRN        IP CONSULT TO NEUROLOGY  IP CONSULT TO CASE MANAGEMENT  IP CONSULT TO PHARMACY    Network Utilization Review Department  ATTENTION: Please call with any questions or concerns to [phone] and carefully listen to the prompts so that you are directed to the right person. All voicemails are confidential.  Jo Jensen all requests for admission clinical reviews, approved or denied determinations and any other requests to dedicated fax number below belonging to the campus where the patient is receiving treatment.  List of dedicated fax numbers for the Facilities:  [place] DENIALS (Administrative/Medical Necessity) [phone] 2303 [facility] (Maternity/NICU/Pediatrics) [phone] [address] [facility] [phone]   [facility] [address] [facility] [phone] 1505 [facility] [address] [place] [address] [id number] [place] [address] [phone]   [address] [address] [phone]

## 2023-08-25 NOTE — H&P
[id number] [facility]  H&P  Name: Yael Thornton 41 y.o. male I MRN: [id number]  Unit/Bed#: ICU 03 I Date of Admission: 8/24/2023   Date of Service: 8/24/2023 I Hospital Day: 0      Assessment/Plan   Acute respiratory failure with hypoxia [facility])  Assessment & Plan  Patient presented with hypoxia, found to have right lower lobe segmental PE. Initially saturating well on nasal cannula. Rapid response was called 8/24 secondary to hypoxia and altered mental status requiring emergent intubation. Hypoxic post intubation requiring high ventilator settings. · CTA PE study 8/23: Pulmonary embolism in the right lower lobe segmental pulmonary artery. Measured RV/LV ratio is within normal limits at less than 0.9. · CXR post intubation: low lung volumes, worsening central congestion ?, left lower lobe infiltrate. Read pending  · On arrival to [facility], episode of hypoxia with sp02 60s despite 100% Fio2 requiring sedation, suctioning for thick secretions    Plan:  · ACVC 16x550, 100, 10 (reduced TV to achieve approx 6mL/kg per ARDs net protocol)  · Wean Fio2 for goal Sp02 >90  · Check ABG and monitor PF ratios  · Send sputum cx, strep pneumo/legionella urine antigen   · Check COVID/flu RSV  · Hypertonic saline nebs given thick secretions  · Chest PT  · Start empiric antibiotics with cefepime/vancomycin for possible pneumonia  · Consider diuretics for possible component of volume overload  · Pulmonary hygiene  · VAP ppx    Encephalopathy  Assessment & Plan  Patient presented to [facility] on 8/23 with hypoxia and encephalopathy. Today, had acute change in GCS prompting rapid response and subsequent intubation for airway protection. Of note, recent hospitalization 8/11-17 for encephalopathy with unclear etiology despite extensive workup but Wernicke's encephalopathy on the differential  · [facility] hospitalization notable for rapid response and intubation on 8/12 for airway protection with extubation on 8/13. Treated with high dose thiamine. Evaluated by psychiatry for depressed mood, possible bipolar disorder. Discharged to home with his sister with visiting nurses, speech therapy, PT/OT  · [place]unk workup  · CT head 8/11: No acute intracranial abnormality. Stable small bilateral subcortical hypoattenuating foci. No associated mass effect. · MRI 8/12: No acute intracranial abnormality. Stable nonspecific enhancement along the anterior floor of the third ventricle/hypothalamus when comparing to the March 15, 2023 study. Differential includes chronic toxic metabolic insult (thiamine deficiency), chronic inflammatory/ infectious conditions. Other considerations include Langerhans cell cell histiocytosis, lymphocytic hypophysitis, and neurosyphilis. Primary CNS lymphoma is a less likely consideration, particularly given the lack of interval change. Serologic correlation is recommended. Stable cerebral white matter signal abnormality, presumably the sequela of chronic microangiopathic disease given the patient's history  · VEEG: negative for seizure activity   · LP: meningitis/encephalitis panel negative, culture negative, lyme and cryptococcal negative, paraneoplastic and autoimmune CSF studies still pending (sent to [facility])  · Today, patient's girlfriend visited [facility] S[facility] Raza and found him unresponsive. Notes said that 'eyes were rolled back with secretions around his mouth". Rapid response was called and he was intubated for hypoxia and low GCS. VBG ? Post intubation 7.25/76/59/33  · CT head 8/24: No acute intracranial abnormality. · On arrival to [facility], patient sedated on fentanyl, agitated and moving all extremities. Was able to follow commands with extremities prior to fentanyl bolus.     Plan:  · Encephalopathy likely multifactorial in the setting of abnormal (however stable) MRI showing possible wernicke's encephalopathy, hypercarbia, hyponatremia  · Recheck ABG to assess for hypercarbia  · Recommend BiPAP vs CPAP HS while inpatient once extubated. Patient would benefit from outpatient PFTs and possible CPAP HS  · Will increase thiamine dose to 500mg Q8   · Spot EEG tomorrow, consideration for transfer to [facility] for cEEG if positive  · Follow up previous LP send out studies, low utility for repeat LP  · Neurology consult, appreciate recommendations  · Hold all sedating meds  · CAM ICU  · Sleep hygiene  · Neurochecks per routine     Sepsis [facility])  Assessment & Plan  Sepsis present on arrival to [facility] by leukocytosis, temp 96.4 with worsening hypoxia requiring intubation. · Thick, yellow/white secretions suctioned via ETT on arrival  · CXR with concern for left lower lobe infiltrate. Patient does have aspiration risk factors with encephalopathy  · Sputum culture, strep pneumo/legionella urine antigens and COVID/flu/RSV ordered  · Blood cultures sent  · UA with reflex  · LA 1  · procal 0.37  · LP on previous admission negative, low utility for repeating LP at this time   · Patient hypotensive on arrival, but likely iatrogenic seconadary to sedation use. Currently on low dose levophed. Will defer fluids given profound hypoxia on high ventilator settings, possible component of volume overload on CXR    Plan:  · Initiate empiric cefepime/vanco  · Follow culture data  · Trend WBC, fever curve and procal     Acute pulmonary embolism without acute cor pulmonale (HCC)  Assessment & Plan  Presented with hypoxia, initially admitted to the floor on supplemental NC  · CTA PE: Pulmonary embolism in the right lower lobe segmental pulmonary artery. Measured RV/LV ratio is within normal limits at less than 0.9.   · BNP 9, initial HS troponin 9  · Initiated on heparin gtt  · Developed worsening hypoxia 8/24, rapid response was called and patient was intubated for airway protection and hypoxic  · Now requiring high vent settings    Plan:  · Continue heparin gtt  · Echocardiogram ordered  · Consider venous duplex Depression  Assessment & Plan  · Continue prozac  · Evaluated by psychiatry last admission, felt to have depressive mood disorder    Hyponatremia  Assessment & Plan  · Sodium 124 on admission, now up to 128 s/p NSS infusion at 50mL/hr  · 8/23: serum osmo 267, urine osmo 335, urine sodium 40 (on demadex PTA)  · Hold NSS for now  · Repeat BMP now  · Continue to closely trend BMP  · Goal NA: 136 by tomorrow AM      LVH (left ventricular hypertrophy)  Assessment & Plan  · Previous echocardiograms demonstrate EF 30%, normal diastolic dysfunction, mild left ventricular hypertrophy, mild RVSP elevation and mild TR  · PTA: demadex 20mg daily  · Evaluated by Heart Failure team in July of this year. LVH thought to be secondary to hypertension, obesity, hx of meth use  · Recommended sleep study at that time     Seminoma of left testis [facility])  Assessment & Plan  · Testicular cancer s/p left radial orchiectomy on 12/2022. Repeat CT scan in January 2023 with enlarging lymph node. Received Etoposide and cistoplatin chemotherapy in March, developed numbness/tingling and vision changes, labile affect and right sided weakness and therefore chemotherapy was stopped. MRI demonstrated white matter changes at that time and he was referred to Neurology   · Continue outpatient follow up with Heme/Onc     Hypertension  Assessment & Plan  · Hx of hypertension with evidence of LVH on echocardiogram  · PTA meds: demadex only  · Hypotension s/p intubation     Umbilical hernia without obstruction and without gangrene  Assessment & Plan  · Umbilical hernia present, easily reducible            History of Present Illness     HPI: Nacho Simmons is a 41 y.o. with past medical history of seminoma of the left testis  s/p left orchiectomy and chemo (currently on hold), hypertension, left ventricular hypertrophy, depression, and obesity who presented to [facility] on [facility]23 from home after his visiting nurse found his pulse oximetry to be in the 80's. CTA PE demonstrated right lower lobe segmental PE without evidence of RV strain. He was initiated on heparin infusion. Oxygenation was adequate on nasal cannula and he was admitted to the floor for further monitoring. Today, a rapid response was called for hypoxia and altered mental status. Chart review notes that the patient's eyes were rolled back with sputum present in the corners of his mouth. No seizure like activity witnessed. He was then emergently intubated for airway protection and hypoxia. VBG (?) collected post intubation demonstrated hypercarbia: 7.25/76/59/33. Once intubated, he continued to have episodes of desaturation requiring high ventilator settings. CT head was negative for acute intracranial abnormalities. He was sedated with propofol but transitioned to fentanyl secondary to hypotension. Levophed was started. He was then transferred to [facility] for further higher level of care. On arrival, he was agitated but was able to follow commands with all extremities. With agitation, his sat's dropped into the 60-70s. Thick, large amount of secretions suctioned via ETT. Initial vent settings AC/VC 16x650, 100, 10. TV reduced to 550 per ARDs net protocol to achieve approx 6mL/kg. CXR concerning for possible left lower lobe infiltrate. Sepsis workup initiated. Infusions running on arrival: heparin gtt, levophed at 5, fentanyl 50mcg/hr. On chart review, outpatient Oncology notes report that chemotherapy was discontinued earlier this year secondary to new double vision, right sided weakness and labile affect. He had multiple falls requiring admission to [facility] for trauma workup 7/28-8/5. During that hospitalization, notes state patient was minimally verbal and family was reporting that he had been that way for several months.  He was discharged to [facility] rehab in the beginning of August. He then had another hospitalization at [facility] (8/11-17) due to worsening somnolence while at rehab. He also required intubation for approximately 24 hours for low GCS during that stay. He had an extensive neurologic workup including CT head which was unremarkable, MRI showing enhancement of the anterior floor of the third ventricle, hypothalamus, mamillary bodies which was stable from previous. LP negative for meningitis/encephalitis. Autoimmune panel and paraneoplastic CSFstudies still pending. cEEG negative for seizures. He was treated with high dose thiamine for possible Wernicki's encephalpathy. Mentation improved on admission and he was discharged home (with his sister) on 8/17 with visiting nurses, PT/OT and speech therapy. Patient's sister reports that Shruthi Corona continued to be encephalopathic after discharge but that his mental status would wax and wane. She said that he often said things that did not make sense. He required assistance for most ADLs. She said he had become incontinent and was requiring briefs. He was minimally ambulatory prior to arrival.     History obtained from sibling and chart review. Review of Systems   Unable to perform ROS: Intubated       Historical Information   Past Medical History:  No date:  Anxiety  No date: Cancer ([address])  No date: Depression  No date: Psychiatric disorder      Comment:  bipolar Past Surgical History:  1/20/2023: IR BIOPSY LYMPH NODE  3/14/2023: IR PORT PLACEMENT  12/14/2022: ORCHIECTOMY; Left      Comment:  Procedure: ORCHIECTOMY INGUINAL;  Surgeon: Gail Hurtado MD;  Location: [facility];  Service: Urology   Current Outpatient Medications   Medication Instructions   • albuterol (PROVENTIL HFA,VENTOLIN HFA) 90 mcg/act inhaler 2 puffs, Inhalation, Every 4 hours PRN   • FLUoxetine (PROZAC) 10 mg, Oral, Daily   • potassium chloride (K-DUR,KLOR-CON) 10 mEq tablet 10 mEq, Oral, Daily   • thiamine 100 mg, Oral, Daily   • torsemide (DEMADEX) 20 mg, Oral, Daily    No Known Allergies   Social History     Tobacco Use   • Smoking status: Former     Packs/day: 0.50     Years: 5.00     Total pack years: 2.50     Types: Cigarettes     Start date: 2008   • Smokeless tobacco: Never   Vaping Use   • Vaping Use: Never used   Substance Use Topics   • Alcohol use: Not Currently     Comment: 2 cases of beer daily, stopped 3 years ago   • Drug use: Yes     Types: Marijuana     Comment: Medical marijuana    Family History   Problem Relation Age of Onset   • Coronary artery disease Maternal Aunt           Objective                            Vitals I/O      Most Recent Min/Max in 24hrs   Temp (!) 97 °F (36.1 °C) Temp  Min: 96.4 °F (35.8 °C)  Max: 97.7 °F (36.5 °C)   Pulse 78 Pulse  Min: 63  Max: 94   Resp 16 Resp  Min: 8  Max: 22   /82 BP  Min: 61/36  Max: 150/69   O2 Sat 95 % SpO2  Min: 54 %  Max: 100 %      Intake/Output Summary (Last 24 hours) at 2023  Last data filed at 2023  Gross per 24 hour   Intake --   Output 100 ml   Net -100 ml         Diet NPO     Invasive Monitoring Physical exam    Physical Exam  Eyes:      Pupils: Pupils are equal, round, and reactive to light. Skin:     General: Skin is warm. Coloration: Skin is not jaundiced. Findings: No wound. HENT:      Head: Normocephalic and atraumatic. Mouth/Throat:      Mouth: Mucous membranes are moist.   Cardiovascular:      Rate and Rhythm: Normal rate and regular rhythm. Musculoskeletal:      Right lower le+ Edema present. Left lower le+ Edema present. Abdominal:      General: Abdomen is protuberant. Bowel sounds are normal.      Palpations: Abdomen is soft. Tenderness: There is no abdominal tenderness. Hernia: A hernia is present. Hernia is present in the umbilical area. Constitutional:       General: He is not in acute distress. Appearance: He is ill-appearing. He is not toxic-appearing. Interventions: He is intubated. Pulmonary:      Effort: He is intubated.       Breath sounds: Examination of the right-upper field reveals rhonchi. Examination of the left-upper field reveals rhonchi. Examination of the right-middle field reveals rhonchi. Examination of the left-middle field reveals rhonchi. Rhonchi present. No rales. Comments: Thick, white/yellow secretions via ETT  Neurological:      Comments: Agitated, able to weakly follow commands with all extremities             Diagnostic Studies      EKG: NSR  Imaging:  I have personally reviewed pertinent reports. and I have personally reviewed pertinent films in PACS     Medications:  Scheduled PRN   cefepime, 2,000 mg, Q12H  chlorhexidine, 15 mL, Q12H [address]  [START ON 8/25/2023] FLUoxetine, 10 mg, Daily  potassium chloride, 20 mEq, Once  sodium chloride, 4 mL, Q6H  thiamine, 500 mg, Q8H  [START ON 8/25/2023] vancomycin, 12.5 mg/kg (Adjusted), Q12H  vancomycin, 2,000 mg, Once      fentanyl citrate (PF), 50 mcg, Q1H PRN  heparin (porcine), 10,000 Units, Q6H PRN  heparin (porcine), 5,000 Units, Q6H PRN       Continuous    fentaNYL, 50 mcg/hr, Last Rate: 50 mcg/hr (08/24/23 1844)  heparin (porcine), 3-30 Units/kg/hr (Order-Specific), Last Rate: 15 Units/kg/hr (08/24/23 2048)  norepinephrine, 1-30 mcg/min, Last Rate: 3 mcg/min (08/24/23 2100)         Labs:    CBC    Recent Labs     08/23/23  1435 08/24/23 2024   WBC 10.11 15.38*   HGB 14.3 13.0   HCT 41.6 38.5    301     BMP    Recent Labs     08/24/23  0834 08/24/23 2024   SODIUM 128* 129*   K 3.7 3.5   CL 83* 86*   CO2 42* 35*   AGAP 3 8   BUN 7 8   CREATININE 0.64 0.59*   CALCIUM 8.5 8.9       Coags    Recent Labs     08/23/23  1435 08/24/23  0038 08/24/23  0834 08/24/23  1534   INR 0.96  --   --   --    PTT 28   < > 84* 64*    < > = values in this interval not displayed.         Additional Electrolytes  Recent Labs     08/23/23  2312   MG 2.3          Blood Gas    Recent Labs     08/24/23 2028   PHART 7.463*   KUI4QTM 50.9*   PO2ART 50.7*   MUA3EBK 35.6*   BEART 10.2   SOURCE Line, Arterial     Recent Labs 08/24/23  1534 08/24/23 2028   PHVEN 7.255*  --    KZN8HLD 76.1*  --    PO2VEN 59.8*  --    GPG3MYB 33.0*  --    BEVEN 3.4  --    SOURCE  --  Line, Arterial    LFTs  Recent Labs     08/23/23  1435   ALT 73*   AST 29   ALKPHOS 123*   ALB 4.2   TBILI 0.52       Infectious  Recent Labs     08/24/23 2024   PROCALCITONI 0.37*     Glucose  Recent Labs     08/23/23  2312 08/24/23  0553 08/24/23  0834 08/24/23 2024   GLUC 111  109 102 97 112             Critical Care Time Delivered: Upon my evaluation, this patient had a high probability of imminent or life-threatening deterioration due to acute respiratory failure, which required my direct attention, intervention, and personal management. I have personally provided 45 minutes of critical care time, exclusive of procedures, teaching, family meetings, and any prior time recorded by providers other than myself.    Anticipated Length of Stay is > 2 midnights  [address] [facility] Gabby Daley

## 2023-08-25 NOTE — ASSESSMENT & PLAN NOTE
Patient presented with hypoxia, found to have right lower lobe segmental PE. Initially saturating well on nasal cannula. Rapid response was called 8/24 secondary to hypoxia and altered mental status requiring emergent intubation. Hypoxic post intubation requiring high ventilator settings. · CTA PE study 8/23: Pulmonary embolism in the right lower lobe segmental pulmonary artery. Measured RV/LV ratio is within normal limits at less than 0.9. · CXR post intubation: low lung volumes, worsening central congestion ?, left lower lobe infiltrate.  Read pending  · On arrival to [facility], episode of hypoxia with sp02 60s despite 100% Fio2 requiring sedation, suctioning for thick secretions    Plan:  · ACVC 16x550, 100, 10 (reduced TV to achieve approx 6mL/kg per ARDs net protocol)  · Wean Fio2 for goal Sp02 >90  · Check ABG and monitor PF ratios  · Send sputum cx, strep pneumo/legionella urine antigen   · Check COVID/flu RSV  · Hypertonic saline nebs given thick secretions  · Chest PT  · Start empiric antibiotics with cefepime/vancomycin for possible pneumonia  · Consider diuretics for possible component of volume overload  · Pulmonary hygiene  · VAP ppx

## 2023-08-25 NOTE — ASSESSMENT & PLAN NOTE
· Previous echocardiograms demonstrate EF 64%, normal diastolic dysfunction, mild left ventricular hypertrophy, mild RVSP elevation and mild TR  · PTA: demadex 20mg daily  · Evaluated by Heart Failure team in July of this year.  LVH thought to be secondary to hypertension, obesity, hx of meth use  · Recommended sleep study at that time

## 2023-08-25 NOTE — PROGRESS NOTES
Neurology Consult Follow Up      Jeramie Marquez is a 41 y.o. male  ICU 03/ICU 03    [id number]        Assessment/Recommendations:    1. Toxic/metabilic encephalopathy   2. H/o Wernicke's encephalopathy   3. Acute respiratory failure with hypoxia  4. Acute PE  5. Hyponatremia  6. H/o metastatic seminoma of left testis      Patient has been somnolent for quite some time. He had chemotherapy in March and had some neurological symptoms. His MRI brain shows non specific enhancement along anterior floor of third ventricle/hypothalamus which is of unclear etiology. All of the csf results thus far have been non significant. Paraneoplastic panel is pending which can take upto 2 weeks for results. Added csf VDRL if sample available and serum studies. Added anti tpo and frederic abs. His EEG from today and past have been slow but there have been no electrographic activity. Discussed with ICU to supportively treat. Will need to wait on paraneoplastic panel before considering ivig/steroids as suspicious is not very high. Aim for least possible sedation. Will follow. Total time of encounter:  45 min  More than 50% of the time was used in counseling and/or coordi      nation of care  Extent of couseling and/or coordination of care    Chief Complaint:  ams  Subjective:     Patient has been somnolent at home for past 2-3 weeks. He requiring high O2. Patient was intubated and then extubated for sometime and again was intubated yesterday. He was diagnosed with metastatic seminoma in Jan of this year and had chemo since. There is h/o alcohol abuse. He is found to have PE and has multiple electrolyte abnormality.      Past Medical History:   Diagnosis Date   • Anxiety    • Cancer ([address])    • Depression    • Psychiatric disorder     bipolar     Social History     Socioeconomic History   • Marital status: Single     Spouse name: Not on file   • Number of children: Not on file   • Years of education: Not on file   • Highest education level: Not on file   Occupational History   • Not on file   Tobacco Use   • Smoking status: Former     Packs/day: 0.50     Years: 5.00     Total pack years: 2.50     Types: Cigarettes     Start date: 1/1/2008   • Smokeless tobacco: Never   Vaping Use   • Vaping Use: Never used   Substance and Sexual Activity   • Alcohol use: Not Currently     Comment: 2 cases of beer daily, stopped 3 years ago   • Drug use: Yes     Types: Marijuana     Comment: Medical marijuana   • Sexual activity: Not Currently   Other Topics Concern   • Not on file   Social History Narrative    ** Merged History Encounter **          Social Determinants of Health     Financial Resource Strain: Not on file   Food Insecurity: No Food Insecurity (8/12/2023)    Hunger Vital Sign    • Worried About Running Out of Food in the Last Year: Never true    • Ran Out of Food in the Last Year: Never true   Transportation Needs: No Transportation Needs (8/12/2023)    PRAPARE - Transportation    • Lack of Transportation (Medical): No    • Lack of Transportation (Non-Medical): No   Physical Activity: Not on file   Stress: Not on file   Social Connections: Not on file   Intimate Partner Violence: Not on file   Housing Stability: Low Risk  (8/12/2023)    Housing Stability Vital Sign    • Unable to Pay for Housing in the Last Year: No    • Number of Places Lived in the Last Year: 1    • Unstable Housing in the Last Year: No     Family History   Problem Relation Age of Onset   • Coronary artery disease Maternal Aunt        ROS:  Please see HPI for positive symptoms. No fever, no chills, no weight change. Ocular: No drainage, no blurred vision. HEENT:  No sore throat, earache, or congestion. No neck pain. COR:  No chest pain. No palpitations. Lungs:  no sob, wheezing,  GI:  no  nausea, no vomiting, no diarrhea, no constipation, no anorexia. :  No dysuria, frequency, or urgency. No hematuria. No vaginal discharge or vaginal bleeding.  Musculoskeletal:  No joint pain or swelling or edema. Skin:  No rash or itching. Psychiatric:  no anxiety, no depression. Endocrine:  No polyuria or polydipsia. Objective:  BP 99/56 (BP Location: Right arm)   Pulse 89   Temp 97.5 °F (36.4 °C)   Resp 15   Ht 6' 4" (1.93 m)   Wt (!) 141 kg (311 lb)   SpO2 98%   BMI 37.86 kg/m²     General: drowsy, on mild sedation, follows simple commands with all 4. Mental status: altered  Attention: fair  Knowledge: erik  Language and Speech:   Cranial nerves: II-XII intact  Muscle tone: normal  Motor strength:  Squeezes both hands and moves both feet upon command. Doesn't move against gravity. Sensory: normal to light touch, pin prick, vibration. Proprioception intact  Gait: erik  Coordination: erik  Reflexes: 2+ throughout  except as noted  No clonus, plantars equivocal       Labs:      Lab Results   Component Value Date    WBC 9.73 08/25/2023    HGB 12.1 08/25/2023    HCT 36.4 (L) 08/25/2023    MCV 96 08/25/2023     08/25/2023     Lab Results   Component Value Date    HGBA1C 5.2 12/10/2022     Lab Results   Component Value Date    ALT 74 (H) 08/25/2023    AST 33 08/25/2023    ALKPHOS 113 (H) 08/25/2023     Lab Results   Component Value Date    GLUCOSE 93 07/28/2023    CALCIUM 8.8 08/25/2023    K 3.8 08/25/2023    CO2 38 (H) 08/25/2023    CL 88 (L) 08/25/2023    BUN 8 08/25/2023    CREATININE 0.66 08/25/2023         Review of reports and notes reveal:      XR chest portable    Result Date: 8/25/2023  1. Continued low lung volumes with bibasilar atelectasis and small effusions. 2. Right upper lobe opacification likely due to atelectasis and/or loculated pleural fluid. 3. Lines and tubes as noted. Workstation performed: [id number]     XR abdomen 1 view kub    Result Date: 8/25/2023  Nonspecific bowel gas pattern. Endogastric tube as noted.  Workstation performed: [id number]     XR chest portable ICU    Result Date: 8/25/2023  New right upper lobe opacification, which may represent atelectasis or pneumonia. Improved linear left midlung atelectasis with stable bilateral lower lung airspace opacities. Workstation performed: [id number]     XR chest 1 view portable    Result Date: 8/25/2023  Stable position of endotracheal and endogastric tubes. Continued interval improvement in aeration of the left lung with linear bandlike atelectasis at the mid to lower left lung. Workstation performed: [id number]     CT head wo contrast    Result Date: 8/24/2023  No acute intracranial abnormality. Workstation performed: [id number]     XR chest 1 view portable    Result Date: 8/24/2023  Interval satisfactory placement of an NG tube. Stable position of the endotracheal tube. Improving aeration of the lower left lung but persistent marked opacity in the upper left lung. Minimal bandlike atelectasis in the right lung. The study was marked in [facility] for immediate notification. Workstation performed: [id number]     XR chest portable    Result Date: 8/24/2023  ET tube slightly retracted, terminating 5 cm above the prashant. Persistent complete opacity of the left lung. Persistent patchy right upper lobe infiltrates. Workstation performed: [id number]     XR chest portable    Result Date: 8/24/2023  New ET tube, tip located 3.5 cm above the prashant. Near complete opacity of the left lung likely related to worsening infiltrate and/or atelectasis. Patchy right upper lobe infiltrates. The study was marked in [facility] for immediate notification. Workstation performed: [id number]     XR abdomen 1 view kub    Result Date: 8/24/2023  Nonobstructive bowel gas pattern. Workstation performed: [id number]     XR chest portable    Result Date: 8/24/2023  Probable chronic atelectasis or scarring left lung base Findings otherwise stable No acute cardiopulmonary disease. Workstation performed: [id number]     CTA ED chest PE Study    Result Date: 8/23/2023  Pulmonary embolism in the right lower lobe segmental pulmonary artery.  Measured RV/LV ratio is within normal limits at less than 0.9. Hepatic steatosis. I personally discussed this study with Cecilia Laughter on 8/23/2023 4:40 PM. Workstation performed: [id number]       MRI brain:    No acute intracranial abnormality.     Stable nonspecific enhancement along the anterior floor of the third ventricle/hypothalamus when comparing to the March 15, 2023 study. Differential includes chronic toxic metabolic insult (thiamine deficiency), chronic inflammatory/ infectious   conditions. Other considerations include Langerhans cell cell histiocytosis, lymphocytic hypophysitis, and neurosyphilis. Primary CNS lymphoma is a less likely consideration, particularly given the lack of interval change. Serologic correlation is recommended.     Stable cerebral white matter signal abnormality, presumably the sequela of chronic microangiopathic disease given the patient's history. Thank you for this consult.           MD [facility]  [address]  [phone]

## 2023-08-25 NOTE — PROGRESS NOTES
[address]  Progress Note  Name: Emmanuel Plaza  MRN: [id number]  Unit/Bed#: ICU 03 I Date of Admission: 8/24/2023   Date of Service: 8/25/2023 I Hospital Day: 1    Assessment/Plan   Acute respiratory failure with hypoxia [facility])  Assessment & Plan  Patient presented with hypoxia, found to have right lower lobe segmental PE. Initially saturating well on nasal cannula. Rapid response was called 8/24 secondary to hypoxia and altered mental status requiring emergent intubation. Hypoxic post intubation requiring high ventilator settings. · CTA PE study 8/23: Pulmonary embolism in the right lower lobe segmental pulmonary artery. Measured RV/LV ratio is within normal limits at less than 0.9.  · Initial CXR post intubation: Near complete opacity of the left lung likely related to worsening infiltrate and/or atelectasis. Patchy right upper lobe infiltrates. · Given rapid onset of acute hypoxia and encephalopathy, concern for possible aspiration event   · On arrival to [facility], episode of hypoxia with sp02 60s despite 100% Fio2 requiring sedation, suctioning for thick secretions  · Repeat CXR on arrival to [facility]: improved aeration on the left side, however continued left lower lobe infiltrate vs atelectasis. Possible central congestion  · Strep pneumo/legionella urine antigen negative  · COVID/flu/RSV negative    Plan:  · ACVC 16x550, 100, 14   · PF ratio 50 overnight, proning deferred at that time due to body habitus and to preserve neurologic monitoring.  PEEP then increased from 12->14 and sedation increased (fent and propofol)  · Peak pressures 29, plat 26, Static compliance 49  · PF ratio up to 91 this AM   · Wean Fio2 for goal Sp02 >90  · Q4 hr ABG  · Hypertonic saline nebs given thick secretions  · Chest PT  · Empiric antibiotics with cefepime/vancomycin for possible pneumonia  · Antibiotic day #2  · Consider diuretics for possible component of volume overload  · Will check KUB, abdomen appears firm and could be contributing to compressive atelectasis   · Pulmonary hygiene  · VAP ppx    Encephalopathy  Assessment & Plan  [facility] presented to [facility] on 8/23 with hypoxia and encephalopathy. Today, had acute change in GCS prompting rapid response and subsequent intubation for airway protection. Of note, recent hospitalization 8/11-17 for encephalopathy with unclear etiology despite extensive workup but Wernicke's encephalopathy on the differential  · [facility] hospitalization notable for rapid response and intubation on 8/12 for airway protection with extubation on 8/13. Treated with high dose thiamine. Evaluated by psychiatry for depressed mood, possible bipolar disorder. Discharged to home with his sister with visiting nurses, speech therapy, PT/OT  · [place] workup  · CT head 8/11: No acute intracranial abnormality. Stable small bilateral subcortical hypoattenuating foci. No associated mass effect. · MRI 8/12: No acute intracranial abnormality. Stable nonspecific enhancement along the anterior floor of the third ventricle/hypothalamus when comparing to the March 15, 2023 study. Differential includes chronic toxic metabolic insult (thiamine deficiency), chronic inflammatory/ infectious conditions. Other considerations include Langerhans cell cell histiocytosis, lymphocytic hypophysitis, and neurosyphilis. Primary CNS lymphoma is a less likely consideration, particularly given the lack of interval change. Serologic correlation is recommended. Stable cerebral white matter signal abnormality, presumably the sequela of chronic microangiopathic disease given the patient's history  · VEEG: negative for seizure activity   · LP: meningitis/encephalitis panel negative, culture negative, lyme and cryptococcal negative, paraneoplastic and autoimmune CSF studies still pending (sent to [facility]placed by [facility])  · 8/24: patient's girlfriend visited [facility] and found him unresponsive.  Notes said that 'eyes were rolled back with secretions around his mouth". Rapid response was called and he was intubated for hypoxia and low GCS. VBG ? Post intubation 7.25/76/59/33  · CT head 8/24: No acute intracranial abnormality. · On arrival to [facility], patient sedated on fentanyl, agitated and moving all extremities. Was able to follow commands with extremities prior to fentanyl bolus. Plan:  · Encephalopathy likely multifactorial in the setting of abnormal (however stable) MRI showing possible wernicke's encephalopathy, hypercarbia, hyponatremia  · Recheck ABG to assess for hypercarbia  · Recommend BiPAP vs CPAP HS while inpatient once extubated. Patient would benefit from outpatient PFTs and possible CPAP HS  · Will increase thiamine dose to 500mg Q8   · Spot EEG today, consideration for transfer to [facility] for cEEG if positive  · Follow up previous LP send out studies, low utility for repeat LP  · Neurology consult, appreciate recommendations  · Hold all sedating meds  · CAM ICU  · Sleep hygiene  · Neurochecks per routine     Sepsis [facility])  Assessment & Plan  Sepsis present on arrival to [facility] by leukocytosis, temp 96.4 with worsening hypoxia requiring intubation. · Thick, yellow/white secretions suctioned via ETT on arrival  · CXR with concern for left lower lobe infiltrate. Patient does have aspiration risk factors with encephalopathy  · Sputum culture, pending  · Strep pneumo/legionella urine antigens negative  · COVID/flu/RSV negative  · Blood cultures pending  · UA unremarkable   · LA 1  · procal 0.37  · LP on previous admission negative, low utility for repeating LP at this time   · Patient hypotensive on arrival, but likely iatrogenic seconadary to sedation use. Levophed now off.  Will defer fluids given profound hypoxia on high ventilator settings, possible component of volume overload on CXR    Plan:  · Initiate empiric cefepime/vanco  · Follow culture data  · Trend WBC, fever curve and procal     Acute pulmonary embolism without acute cor pulmonale (HCC)  Assessment & Plan  Presented with hypoxia, initially admitted to the floor on supplemental NC  · CTA PE: Pulmonary embolism in the right lower lobe segmental pulmonary artery. Measured RV/LV ratio is within normal limits at less than 0.9. · BNP 9, initial HS troponin 9  · Initiated on heparin gtt  · Developed worsening hypoxia 8/24, rapid response was called and patient was intubated for airway protection and hypoxic  · Now requiring high vent settings    Plan:  · Continue heparin gtt  · Echocardiogram ordered  · Consider venous duplex     Depression  Assessment & Plan  · Continue prozac  · Evaluated by psychiatry last admission, felt to have depressive mood disorder    Hyponatremia  Assessment & Plan  · Sodium 124 on admission, now up to 128 s/p NSS infusion at 50mL/hr  · 8/23: serum osmo 267, urine osmo 335, urine sodium 40 (on demadex PTA)  · Hold NSS for now  · Repeat BMP with sodium 129  · Continue to closely trend BMP      LVH (left ventricular hypertrophy)  Assessment & Plan  · Previous echocardiograms demonstrate EF 02%, normal diastolic dysfunction, mild left ventricular hypertrophy, mild RVSP elevation and mild TR  · PTA: demadex 20mg daily  · Evaluated by Heart Failure team in July of this year. LVH thought to be secondary to hypertension, obesity, hx of meth use  · Recommended sleep study at that time   · Echo pending    Seminoma of left testis [facility])  Assessment & Plan  · Testicular cancer s/p left radial orchiectomy on 12/2022. Repeat CT scan in January 2023 with enlarging lymph node. Received Etoposide and cistoplatin chemotherapy in March, developed numbness/tingling and vision changes, labile affect and right sided weakness and therefore chemotherapy was stopped.  MRI demonstrated white matter changes at that time and he was referred to Neurology   · Continue outpatient follow up with Heme/Onc     Hypertension  Assessment & Plan  · Hx of hypertension with evidence of LVH on echocardiogram  · PTA meds: demadex only  · Hypotension s/p intubation     Umbilical hernia without obstruction and without gangrene  Assessment & Plan  · Umbilical hernia present, easily reducible              ICU Core Measures     Vented Patient  VAP Bundle  VAP bundle ordered     A: Assess, Prevent, and Manage Pain · Has pain been assessed? Yes  · Need for changes to pain regimen? No   B: Both Spontaneous Awakening Trials (SATs) and Spontaneous Breathing Trials (SBTs) · Plan to perform spontaneous awakening trial today? Yes   · Plan to perform spontaneous breathing trial today? No secondary to severe hypoxia/hypocapnia   · Obvious barriers to extubation? Yes   C: Choice of Sedation · RASS Goal: -2 Light Sedation  · Need for changes to sedation or analgesia regimen? No   D: Delirium · CAM-ICU: Unable to perform secondary to Acute cognitive dysfunction   E: Early Mobility  · Plan for early mobility? No   F: Family Engagement · Plan for family engagement today? Yes       Antibiotic Review: Continue broad spectrum secondary to severity of illness. Review of Invasive Devices: [place] Plan: Continue for accurate I/O monitoring for 48 hours    Bambi Plan: Keep arterial line for hemodynamic monitoring and frequent ABGs    Prophylaxis:  VTE VTE covered by:  heparin (porcine), Intravenous, 13 Units/kg/hr at 08/24/23 2333  heparin (porcine), Intravenous  heparin (porcine), Intravenous       Stress Ulcer  not ordered       Subjective   HPI/24hr events: Admitted yesterday as a transfer from [facility]. Arrived intubated and hypoxic and on low dose levophed. Mild agitation on arrival but was able to follow commands with all extremities. Sepsis workup initiated. Initial PF ratio 50. Proning deferred due to body habitus and to preserve neurologic monitoring. Aggressive suctioning and airway clearance yielded large volume of thick secretions.  Sedation increased overnight with additional of low dose propofol. Levophed off most of the night. PF ratio up to 94 this AM.         Review of Systems   Unable to perform ROS: Intubated        Objective                            Vitals I/O      Most Recent Min/Max in 24hrs   Temp 97.5 °F (36.4 °C) Temp  Min: 96.4 °F (35.8 °C)  Max: 98.1 °F (36.7 °C)   Pulse 78 Pulse  Min: 63  Max: 94   Resp 16 Resp  Min: 8  Max: 22   BP 98/65 BP  Min: 61/36  Max: 128/73   O2 Sat 100 % SpO2  Min: 54 %  Max: 100 %      Intake/Output Summary (Last 24 hours) at 2023 0534  Last data filed at 2023 0404  Gross per 24 hour   Intake 750 ml   Output 330 ml   Net 420 ml         Diet NPO     Invasive Monitoring Physical exam   Arterial Line  Bambi BP 98/57  Arterial Line BP  Min: 87/54  Max: 139/74   MAP 71 mmHg  Arterial Line MAP (mmHg)  Min: 65 mmHg  Max: 93 mmHg    Physical Exam  Eyes:      Pupils: Pupils are equal, round, and reactive to light. Skin:     General: Skin is warm and dry. HENT:      Head: Normocephalic and atraumatic. Cardiovascular:      Rate and Rhythm: Normal rate and regular rhythm. Pulses: Normal pulses. Musculoskeletal:      Right lower le+ Edema present. Left lower le+ Edema present. Abdominal:      General: Abdomen is protuberant. Palpations: Abdomen is soft. Hernia: A hernia is present. Hernia is present in the umbilical area. Comments: Large amount of yellow OG tube drainage   Constitutional:       General: He is not in acute distress. Appearance: He is obese. Pulmonary:      Effort: No bradypnea, accessory muscle usage, respiratory distress or accessory muscle usage. Breath sounds: Decreased breath sounds present. No wheezing. Comments: Improved rhonchi. Not overbreathing set vent rate. Thick tan/yellow secretions via ETT  Neurological:      GCS: GCS eye subscore is 3. GCS verbal subscore is 1. GCS motor subscore is 6.       Comments: Sedated but was able to weakly follow commands with all extremities, appears to track with eyes when stimulated            Diagnostic Studies      EKG: NSR on tele  Imaging:  I have personally reviewed pertinent reports. and I have personally reviewed pertinent films in PACS     Medications:  Scheduled PRN   cefepime, 2,000 mg, Q12H  chlorhexidine, 15 mL, Q12H ISAEL  FLUoxetine, 10 mg, Daily  sodium chloride, 4 mL, Q6H  thiamine, 500 mg, Q8H  vancomycin, 12.5 mg/kg (Adjusted), Q12H      fentanyl citrate (PF), 50 mcg, Q1H PRN  heparin (porcine), 10,000 Units, Q6H PRN  heparin (porcine), 5,000 Units, Q6H PRN       Continuous    fentaNYL, 50 mcg/hr, Last Rate: 50 mcg/hr (08/24/23 1844)  heparin (porcine), 3-30 Units/kg/hr (Order-Specific), Last Rate: 13 Units/kg/hr (08/24/23 2333)  norepinephrine, 1-30 mcg/min, Last Rate: Stopped (08/24/23 2245)  propofol, 5-50 mcg/kg/min, Last Rate: 5 mcg/kg/min (08/25/23 0400)         Labs:    CBC    Recent Labs     08/23/23  1435 08/24/23 2024   WBC 10.11 15.38*   HGB 14.3 13.0   HCT 41.6 38.5    301     BMP    Recent Labs     08/24/23  0834 08/24/23 2024   SODIUM 128* 129*   K 3.7 3.5   CL 83* 86*   CO2 42* 35*   AGAP 3 8   BUN 7 8   CREATININE 0.64 0.59*   CALCIUM 8.5 8.9       Coags    Recent Labs     08/23/23  1435 08/24/23  0038 08/24/23 1534 08/24/23 2249   INR 0.96  --   --   --    PTT 28   < > 64* 104*    < > = values in this interval not displayed. Additional Electrolytes  Recent Labs     08/23/23  2312   MG 2.3          Blood Gas    Recent Labs     08/25/23  0356   PHART 7.519*   RMJ8MHK 43.4   PO2ART 93.4   LZQ3OIN 34.6*   BEART 10.6   SOURCE Line, Arterial     Recent Labs     08/24/23  1534 08/24/23 2028 08/25/23  0356   PHVEN 7.255*  --   --    SSB5FLH 76.1*  --   --    PO2VEN 59.8*  --   --    RKV5TZP 33.0*  --   --    BEVEN 3.4  --   --    SOURCE  --    < > Line, Arterial    < > = values in this interval not displayed.     LFTs  Recent Labs     08/23/23  1435   ALT 73*   AST 29   ALKPHOS 123*   ALB 4.2 TBILI 0.52       Infectious  Recent Labs     08/24/23 2024   PROCALCITONI 0.37*     Glucose  Recent Labs     08/23/23  2312 08/24/23  0553 08/24/23  0834 08/24/23 2024   GLUC 111  109 102 97 112               Critical Care Time Delivered: Upon my evaluation, this patient had a high probability of imminent or life-threatening deterioration due to acute resp failure, which required my direct attention, intervention, and personal management. I have personally provided 30 minutes of critical care time, exclusive of procedures, teaching, family meetings, and any prior time recorded by providers other than myself.      Emily Umana

## 2023-08-25 NOTE — UTILIZATION REVIEW
NOTIFICATION OF INPATIENT ADMISSION   AUTHORIZATION REQUEST   SERVICING FACILITY:   [address] [place] [address]  [address] [place] [address]  Tax ID: [id number]  NPI: [id number] ATTENDING PROVIDER:  Attending Name and NPI#: Dc Levaitt Md [[id number]]  Address: [address] [place] [address]  Phone: [phone]   ADMISSION INFORMATION:  [facility]   Place of Service Code: 21  Inpatient Admission Date/Time: 8/24/23  6:25 PM  Discharge Date/Time: No discharge date for patient encounter. Admitting Diagnosis Code/Description:  Acute pulmonary embolism without acute cor pulmonale ([address])     UTILIZATION REVIEW CONTACT:  Tye Allen Utilization   Network Utilization Review Department  Phone: [phone]  Fax [phone]  Email: [email or web address]  Contact for approvals/pending authorizations, clinical reviews, and discharge. PHYSICIAN ADVISORY SERVICES:  Medical Necessity Denial & Rqkx-jl-Abqu Review  Phone: [phone]  Fax: [phone]  Email: [email or web address]

## 2023-08-26 ENCOUNTER — APPOINTMENT (INPATIENT)
Dept: RADIOLOGY | Facility: HOSPITAL | Age: 38
DRG: 005 | End: 2023-08-26
Payer: COMMERCIAL

## 2023-08-26 LAB
ALBUMIN SERPL BCP-MCNC: 3.1 G/DL (ref 3.5–5)
ALP SERPL-CCNC: 90 U/L (ref 34–104)
ALT SERPL W P-5'-P-CCNC: 55 U/L (ref 7–52)
ANION GAP SERPL CALCULATED.3IONS-SCNC: 4 MMOL/L
ANION GAP SERPL CALCULATED.3IONS-SCNC: 4 MMOL/L
AORTIC ROOT: 3.7 CM
APTT PPP: 59 SECONDS (ref 23–37)
APTT PPP: 93 SECONDS (ref 23–37)
AST SERPL W P-5'-P-CCNC: 23 U/L (ref 13–39)
AV LVOT PEAK GRADIENT: 2 MMHG
AV PEAK GRADIENT: 5 MMHG
BACTERIA UR CULT: NORMAL
BASE EXCESS BLDA CALC-SCNC: 8.5 MMOL/L
BASE EXCESS BLDA CALC-SCNC: 8.7 MMOL/L
BASE EXCESS BLDA CALC-SCNC: 9.9 MMOL/L
BILIRUB SERPL-MCNC: 0.45 MG/DL (ref 0.2–1)
BODY TEMPERATURE: 97.5 DEGREES FEHRENHEIT
BODY TEMPERATURE: 97.6 DEGREES FEHRENHEIT
BODY TEMPERATURE: 97.9 DEGREES FEHRENHEIT
BUN SERPL-MCNC: 7 MG/DL (ref 5–25)
BUN SERPL-MCNC: 9 MG/DL (ref 5–25)
CALCIUM ALBUM COR SERPL-MCNC: 9.1 MG/DL (ref 8.3–10.1)
CALCIUM SERPL-MCNC: 8.4 MG/DL (ref 8.4–10.2)
CALCIUM SERPL-MCNC: 8.6 MG/DL (ref 8.4–10.2)
CHLORIDE SERPL-SCNC: 90 MMOL/L (ref 96–108)
CHLORIDE SERPL-SCNC: 93 MMOL/L (ref 96–108)
CO2 SERPL-SCNC: 36 MMOL/L (ref 21–32)
CO2 SERPL-SCNC: 37 MMOL/L (ref 21–32)
CREAT SERPL-MCNC: 0.88 MG/DL (ref 0.6–1.3)
CREAT SERPL-MCNC: 0.9 MG/DL (ref 0.6–1.3)
DOP CALC LVOT AREA: 3.14 CM2
DOP CALC LVOT DIAMETER: 2 CM
E WAVE DECELERATION TIME: 302 MS
ERYTHROCYTE [DISTWIDTH] IN BLOOD BY AUTOMATED COUNT: 14.4 % (ref 11.6–15.1)
FRACTIONAL SHORTENING: 29 % (ref 28–44)
GFR SERPL CREATININE-BSD FRML MDRD: 109 ML/MIN/1.73SQ M
GFR SERPL CREATININE-BSD FRML MDRD: 110 ML/MIN/1.73SQ M
GLUCOSE SERPL-MCNC: 104 MG/DL (ref 65–140)
GLUCOSE SERPL-MCNC: 94 MG/DL (ref 65–140)
HCO3 BLDA-SCNC: 34.4 MMOL/L (ref 22–28)
HCO3 BLDA-SCNC: 35.1 MMOL/L (ref 22–28)
HCO3 BLDA-SCNC: 35.8 MMOL/L (ref 22–28)
HCT VFR BLD AUTO: 31.4 % (ref 36.5–49.3)
HGB BLD-MCNC: 10.5 G/DL (ref 12–17)
HOROWITZ INDEX BLDA+IHG-RTO: 100 MM[HG]
HOROWITZ INDEX BLDA+IHG-RTO: 90 MM[HG]
INTERVENTRICULAR SEPTUM IN DIASTOLE (PARASTERNAL SHORT AXIS VIEW): 1 CM
INTERVENTRICULAR SEPTUM: 1 CM (ref 0.6–1.1)
LEFT ATRIUM SIZE: 3.6 CM
LEFT INTERNAL DIMENSION IN SYSTOLE: 3 CM (ref 2.1–4)
LEFT VENTRICULAR INTERNAL DIMENSION IN DIASTOLE: 4.2 CM (ref 3.5–6)
LEFT VENTRICULAR POSTERIOR WALL IN END DIASTOLE: 0.9 CM
LEFT VENTRICULAR STROKE VOLUME: 44 ML
LVSV (TEICH): 44 ML
MCH RBC QN AUTO: 32.6 PG (ref 26.8–34.3)
MCHC RBC AUTO-ENTMCNC: 33.4 G/DL (ref 31.4–37.4)
MCV RBC AUTO: 98 FL (ref 82–98)
MRSA NOSE QL CULT: NORMAL
MV E'TISSUE VEL-LAT: 12 CM/S
MV E'TISSUE VEL-SEP: 5 CM/S
MV PEAK A VEL: 0.72 M/S
MV PEAK E VEL: 60 CM/S
MV STENOSIS PRESSURE HALF TIME: 88 MS
MV VALVE AREA P 1/2 METHOD: 2.5 CM2
O2 CT BLDA-SCNC: 15.4 ML/DL (ref 16–23)
O2 CT BLDA-SCNC: 15.5 ML/DL (ref 16–23)
O2 CT BLDA-SCNC: 16.3 ML/DL (ref 16–23)
OXYHGB MFR BLDA: 96.3 % (ref 94–97)
OXYHGB MFR BLDA: 97.3 % (ref 94–97)
OXYHGB MFR BLDA: 98.1 % (ref 94–97)
PCO2 BLDA: 53.7 MM HG (ref 36–44)
PCO2 BLDA: 54.8 MM HG (ref 36–44)
PCO2 BLDA: 57.4 MM HG (ref 36–44)
PCO2 TEMP ADJ BLDA: 52.3 MM HG (ref 36–44)
PCO2 TEMP ADJ BLDA: 53.8 MM HG (ref 36–44)
PCO2 TEMP ADJ BLDA: 55.9 MM HG (ref 36–44)
PEEP RESPIRATORY: 12 CM[H2O]
PEEP RESPIRATORY: 12 CM[H2O]
PH BLD: 7.41 [PH] (ref 7.35–7.45)
PH BLD: 7.43 [PH] (ref 7.35–7.45)
PH BLD: 7.44 [PH] (ref 7.35–7.45)
PH BLDA: 7.4 [PH] (ref 7.35–7.45)
PH BLDA: 7.42 [PH] (ref 7.35–7.45)
PH BLDA: 7.43 [PH] (ref 7.35–7.45)
PLATELET # BLD AUTO: 206 THOUSANDS/UL (ref 149–390)
PMV BLD AUTO: 8.9 FL (ref 8.9–12.7)
PO2 BLD: 104 MM HG (ref 75–129)
PO2 BLD: 141.1 MM HG (ref 75–129)
PO2 BLD: 89.3 MM HG (ref 75–129)
PO2 BLDA: 107.6 MM HG (ref 75–129)
PO2 BLDA: 143.5 MM HG (ref 75–129)
PO2 BLDA: 92.7 MM HG (ref 75–129)
POTASSIUM SERPL-SCNC: 3.2 MMOL/L (ref 3.5–5.3)
POTASSIUM SERPL-SCNC: 3.7 MMOL/L (ref 3.5–5.3)
PROT SERPL-MCNC: 5.5 G/DL (ref 6.4–8.4)
PV PEAK GRADIENT: 4 MMHG
RBC # BLD AUTO: 3.22 MILLION/UL (ref 3.88–5.62)
SL CV LV EF: 60
SL CV PED ECHO LEFT VENTRICLE DIASTOLIC VOLUME (MOD BIPLANE) 2D: 78 ML
SL CV PED ECHO LEFT VENTRICLE SYSTOLIC VOLUME (MOD BIPLANE) 2D: 35 ML
SODIUM SERPL-SCNC: 131 MMOL/L (ref 135–147)
SODIUM SERPL-SCNC: 133 MMOL/L (ref 135–147)
SPECIMEN SOURCE: ABNORMAL
TR MAX PG: 27 MMHG
TR PEAK VELOCITY: 2.6 M/S
TREPONEMA PALLIDUM IGG+IGM AB [PRESENCE] IN SERUM OR PLASMA BY IMMUNOASSAY: NORMAL
TRICUSPID ANNULAR PLANE SYSTOLIC EXCURSION: 1.6 CM
TRICUSPID VALVE PEAK REGURGITATION VELOCITY: 2.58 M/S
VANCOMYCIN TROUGH SERPL-MCNC: 11.1 UG/ML (ref 10–20)
VENT AC: 14
VENT AC: 14
VENT- AC: AC
VENT- AC: AC
VT SETTING VENT: 500 ML
VT SETTING VENT: 500 ML
WBC # BLD AUTO: 5.8 THOUSAND/UL (ref 4.31–10.16)

## 2023-08-26 PROCEDURE — 94668 MNPJ CHEST WALL SBSQ: CPT

## 2023-08-26 PROCEDURE — 94003 VENT MGMT INPAT SUBQ DAY: CPT

## 2023-08-26 PROCEDURE — 86376 MICROSOMAL ANTIBODY EACH: CPT | Performed by: PSYCHIATRY & NEUROLOGY

## 2023-08-26 PROCEDURE — 94669 MECHANICAL CHEST WALL OSCILL: CPT

## 2023-08-26 PROCEDURE — 80048 BASIC METABOLIC PNL TOTAL CA: CPT | Performed by: PHYSICIAN ASSISTANT

## 2023-08-26 PROCEDURE — G1004 CDSM NDSC: HCPCS

## 2023-08-26 PROCEDURE — 82805 BLOOD GASES W/O2 SATURATION: CPT | Performed by: NURSE PRACTITIONER

## 2023-08-26 PROCEDURE — 94760 N-INVAS EAR/PLS OXIMETRY 1: CPT

## 2023-08-26 PROCEDURE — 85730 THROMBOPLASTIN TIME PARTIAL: CPT | Performed by: ANESTHESIOLOGY

## 2023-08-26 PROCEDURE — 71250 CT THORAX DX C-: CPT

## 2023-08-26 PROCEDURE — 80202 ASSAY OF VANCOMYCIN: CPT | Performed by: NURSE PRACTITIONER

## 2023-08-26 PROCEDURE — 85027 COMPLETE CBC AUTOMATED: CPT | Performed by: NURSE PRACTITIONER

## 2023-08-26 PROCEDURE — 80053 COMPREHEN METABOLIC PANEL: CPT | Performed by: NURSE PRACTITIONER

## 2023-08-26 PROCEDURE — 83519 RIA NONANTIBODY: CPT | Performed by: PSYCHIATRY & NEUROLOGY

## 2023-08-26 PROCEDURE — 86780 TREPONEMA PALLIDUM: CPT | Performed by: PSYCHIATRY & NEUROLOGY

## 2023-08-26 PROCEDURE — 99291 CRITICAL CARE FIRST HOUR: CPT | Performed by: ANESTHESIOLOGY

## 2023-08-26 PROCEDURE — 94640 AIRWAY INHALATION TREATMENT: CPT

## 2023-08-26 PROCEDURE — 94150 VITAL CAPACITY TEST: CPT

## 2023-08-26 RX ORDER — POTASSIUM CHLORIDE 20MEQ/15ML
40 LIQUID (ML) ORAL ONCE
Status: COMPLETED | OUTPATIENT
Start: 2023-08-26 | End: 2023-08-26

## 2023-08-26 RX ORDER — ACETAMINOPHEN 160 MG/5ML
650 SUSPENSION ORAL EVERY 4 HOURS PRN
Status: DISCONTINUED | OUTPATIENT
Start: 2023-08-26 | End: 2023-09-23 | Stop reason: HOSPADM

## 2023-08-26 RX ORDER — SENNOSIDES 8.8 MG/5ML
17.6 LIQUID ORAL 2 TIMES DAILY
Status: DISCONTINUED | OUTPATIENT
Start: 2023-08-26 | End: 2023-08-28

## 2023-08-26 RX ORDER — FUROSEMIDE 10 MG/ML
40 INJECTION INTRAMUSCULAR; INTRAVENOUS ONCE
Status: COMPLETED | OUTPATIENT
Start: 2023-08-26 | End: 2023-08-26

## 2023-08-26 RX ORDER — ENOXAPARIN SODIUM 150 MG/ML
1 INJECTION SUBCUTANEOUS EVERY 12 HOURS SCHEDULED
Status: DISCONTINUED | OUTPATIENT
Start: 2023-08-26 | End: 2023-09-05

## 2023-08-26 RX ORDER — POLYETHYLENE GLYCOL 3350 17 G/17G
17 POWDER, FOR SOLUTION ORAL DAILY
Status: DISCONTINUED | OUTPATIENT
Start: 2023-08-26 | End: 2023-08-28

## 2023-08-26 RX ADMIN — PIPERACILLIN AND TAZOBACTAM 3.38 G: 36; 4.5 INJECTION, POWDER, FOR SOLUTION INTRAVENOUS at 16:32

## 2023-08-26 RX ADMIN — FLUOXETINE 10 MG: 10 CAPSULE ORAL at 08:07

## 2023-08-26 RX ADMIN — CHLORHEXIDINE GLUCONATE 15 ML: 1.2 RINSE ORAL at 20:58

## 2023-08-26 RX ADMIN — HEPARIN SODIUM 5000 UNITS: 1000 INJECTION INTRAVENOUS; SUBCUTANEOUS at 02:23

## 2023-08-26 RX ADMIN — ENOXAPARIN SODIUM 135 MG: 150 INJECTION SUBCUTANEOUS at 20:58

## 2023-08-26 RX ADMIN — SENNOSIDES 17.6 MG: 8.8 SYRUP ORAL at 20:58

## 2023-08-26 RX ADMIN — CHLORHEXIDINE GLUCONATE 15 ML: 1.2 RINSE ORAL at 08:07

## 2023-08-26 RX ADMIN — POTASSIUM CHLORIDE 40 MEQ: 1.5 SOLUTION ORAL at 07:48

## 2023-08-26 RX ADMIN — FUROSEMIDE 40 MG: 10 INJECTION, SOLUTION INTRAVENOUS at 14:25

## 2023-08-26 RX ADMIN — SODIUM CHLORIDE SOLN NEBU 3% 4 ML: 3 NEBU SOLN at 19:53

## 2023-08-26 RX ADMIN — PIPERACILLIN AND TAZOBACTAM 3.38 G: 36; 4.5 INJECTION, POWDER, FOR SOLUTION INTRAVENOUS at 04:26

## 2023-08-26 RX ADMIN — POLYETHYLENE GLYCOL 3350 17 G: 17 POWDER, FOR SOLUTION ORAL at 14:25

## 2023-08-26 RX ADMIN — POTASSIUM CHLORIDE 40 MEQ: 1.5 SOLUTION ORAL at 23:59

## 2023-08-26 RX ADMIN — PIPERACILLIN AND TAZOBACTAM 3.38 G: 36; 4.5 INJECTION, POWDER, FOR SOLUTION INTRAVENOUS at 22:08

## 2023-08-26 RX ADMIN — ENOXAPARIN SODIUM 135 MG: 150 INJECTION SUBCUTANEOUS at 10:31

## 2023-08-26 RX ADMIN — ACETAMINOPHEN 650 MG: 650 SUSPENSION ORAL at 16:32

## 2023-08-26 RX ADMIN — SENNOSIDES 17.6 MG: 8.8 SYRUP ORAL at 14:25

## 2023-08-26 RX ADMIN — FENTANYL CITRATE 50 MCG: 50 INJECTION, SOLUTION INTRAMUSCULAR; INTRAVENOUS at 11:28

## 2023-08-26 RX ADMIN — THIAMINE HYDROCHLORIDE 500 MG: 100 INJECTION, SOLUTION INTRAMUSCULAR; INTRAVENOUS at 20:13

## 2023-08-26 RX ADMIN — PIPERACILLIN AND TAZOBACTAM 3.38 G: 36; 4.5 INJECTION, POWDER, FOR SOLUTION INTRAVENOUS at 10:30

## 2023-08-26 RX ADMIN — PROPOFOL 10 MCG/KG/MIN: 10 INJECTION, EMULSION INTRAVENOUS at 01:47

## 2023-08-26 RX ADMIN — SODIUM CHLORIDE SOLN NEBU 3% 4 ML: 3 NEBU SOLN at 13:34

## 2023-08-26 RX ADMIN — VANCOMYCIN HYDROCHLORIDE 1250 MG: 10 INJECTION, POWDER, LYOPHILIZED, FOR SOLUTION INTRAVENOUS at 08:06

## 2023-08-26 RX ADMIN — SODIUM CHLORIDE SOLN NEBU 3% 4 ML: 3 NEBU SOLN at 01:19

## 2023-08-26 RX ADMIN — THIAMINE HYDROCHLORIDE 500 MG: 100 INJECTION, SOLUTION INTRAMUSCULAR; INTRAVENOUS at 12:39

## 2023-08-26 RX ADMIN — THIAMINE HYDROCHLORIDE 500 MG: 100 INJECTION, SOLUTION INTRAMUSCULAR; INTRAVENOUS at 04:25

## 2023-08-26 RX ADMIN — SODIUM CHLORIDE SOLN NEBU 3% 4 ML: 3 NEBU SOLN at 08:06

## 2023-08-26 RX ADMIN — VANCOMYCIN HYDROCHLORIDE 1250 MG: 10 INJECTION, POWDER, LYOPHILIZED, FOR SOLUTION INTRAVENOUS at 20:13

## 2023-08-26 RX ADMIN — Medication 20 MG: at 08:06

## 2023-08-26 NOTE — ASSESSMENT & PLAN NOTE
Patient presented to [facility] on 8/23 with hypoxia and encephalopathy. Today, had acute change in GCS prompting rapid response and subsequent intubation for airway protection. Of note, recent hospitalization 8/11-17 for encephalopathy with unclear etiology despite extensive workup but Wernicke's encephalopathy on the differential  · [facility] hospitalization notable for rapid response and intubation on 8/12 for airway protection with extubation on 8/13. Treated with high dose thiamine. Evaluated by psychiatry for depressed mood, possible bipolar disorder. Discharged to home with his sister with visiting nurses, speech therapy, PT/OT  · [place] workup  · CT head 8/11: No acute intracranial abnormality. Stable small bilateral subcortical hypoattenuating foci. No associated mass effect. · MRI 8/12: No acute intracranial abnormality. Stable nonspecific enhancement along the anterior floor of the third ventricle/hypothalamus when comparing to the March 15, 2023 study. Differential includes chronic toxic metabolic insult (thiamine deficiency), chronic inflammatory/ infectious conditions. Other considerations include Langerhans cell cell histiocytosis, lymphocytic hypophysitis, and neurosyphilis. Primary CNS lymphoma is a less likely consideration, particularly given the lack of interval change. Serologic correlation is recommended. Stable cerebral white matter signal abnormality, presumably the sequela of chronic microangiopathic disease given the patient's history  · VEEG: negative for seizure activity   · LP: meningitis/encephalitis panel negative, culture negative, lyme and cryptococcal negative, paraneoplastic and autoimmune CSF studies still pending (sent to [facility])  · 8/24: patient's girlfriend visited [facility] and found him unresponsive. Notes said that 'eyes were rolled back with secretions around his mouth". Rapid response was called and he was intubated for hypoxia and low GCS. VBG ?  Post intubation 7. 25/76/59/33  · CT head 8/24: No acute intracranial abnormality. · On arrival to [facility], patient sedated on fentanyl, agitated and moving all extremities. Was able to follow commands with extremities prior to fentanyl bolus. · 8/25 EEG: This Routine EEG recorded during wakefulness, drowsiness, and sleep is abnormal due to presence of mild background slowing suggestive of underlying cerebral dysfunction from toxic/metablic/infectious/hypoxic encephalopathy. There is no evidence of electrographic seizures. Clinical correlation is recommended. Plan:  · Encephalopathy likely multifactorial in the setting of abnormal (however stable) MRI showing possible wernicke's encephalopathy, hypercarbia, hyponatremia  · Recommend BiPAP vs CPAP HS while inpatient once extubated.  Patient would benefit from outpatient PFTs and possible CPAP HS  · Continue thiamine dose to 500mg Q8   · Follow up previous LP send out studies, low utility for repeat LP  · Neurology consult, appreciate recommendations  · Hold all sedating meds  · CAM ICU  · Sleep hygiene  · Neurochecks per routine

## 2023-08-26 NOTE — ASSESSMENT & PLAN NOTE
Patient presented with hypoxia, found to have right lower lobe segmental PE. Initially saturating well on nasal cannula. Rapid response was called 8/24 secondary to hypoxia and altered mental status requiring emergent intubation. Hypoxic post intubation requiring high ventilator settings. · CTA PE study 8/23: Pulmonary embolism in the right lower lobe segmental pulmonary artery. Measured RV/LV ratio is within normal limits at less than 0.9.  · Initial CXR post intubation: Near complete opacity of the left lung likely related to worsening infiltrate and/or atelectasis. Patchy right upper lobe infiltrates. · Given rapid onset of acute hypoxia and encephalopathy, concern for possible aspiration event   · On arrival to [facility], episode of hypoxia with sp02 60s despite 100% Fio2 requiring sedation, suctioning for thick secretions  · Repeat CXR on arrival to [facility]: improved aeration on the left side, however continued left lower lobe infiltrate vs atelectasis. Possible central congestion  · Strep pneumo/legionella urine antigen negative  · COVID/flu/RSV negative  · 8/25 Bronchoscopy: poorly tolerated secondary to hypoxia. Some mucus plugging present on the left.      Plan:  · ACVC 16x550, 90, 12  · Peak pressures 28, plat 23, Static compliance 48  · PF ratio up to 114  · Wean Fio2 for goal Sp02 >90  · Q4 hr ABG  · Hypertonic saline nebs given thick secretions  · Chest PT  · Empiric antibiotics with zosyn/vancomycin for possible pneumonia  · Antibiotic day #3  · Consider diuretics for possible component of volume overload  · Pulmonary hygiene  · VAP ppx

## 2023-08-26 NOTE — ASSESSMENT & PLAN NOTE
· Previous echocardiograms demonstrate EF 76%, normal diastolic dysfunction, mild left ventricular hypertrophy, mild RVSP elevation and mild TR  · PTA: demadex 20mg daily  · Evaluated by Heart Failure team in July of this year.  LVH thought to be secondary to hypertension, obesity, hx of meth use  · Recommended sleep study at that time   · Echo read pending

## 2023-08-26 NOTE — UTILIZATION REVIEW
Continued Stay Review    Date: 8/26/23                          Current Patient Class: inpatient  Current Level of Care: ICU  HPI:38 y.o. male initially admitted on 8/24/23     Assessment/Plan:   HPI/24hr events: Attempted toilet bronchoscopy but had poor tolerance secondary to hypoxia. Secretions noted in left lower lobes. Oxygenation slowly improving throughout the day. Continues to be arousable, following commands weakly to stimulation. EEG showed  Mild background slowing suggestive of cerebral dysfunction from toxic/metabolic encephalopathy. No electrographic seizure. Overnight, PF ratio improved to 147. Fio2 decreased to 90% from 100%. Intubated, decreased breath sounds, Abd distended, 2+BLE edema, weakly follows commands with all extremities when stimulated.      Vital Signs:   Date/Time Temp Pulse Resp BP MAP (mmHg) Arterial Line BP MAP SpO2 FiO2 (%) O2 Device Patient Position - Orthostatic VS   08/26/23 1000 99 °F (37.2 °C) 81 20 101/62 75 111/57 75 mmHg 94 % -- -- --   08/26/23 0900 98.2 °F (36.8 °C) 81 16 -- -- 117/70 88 mmHg 97 % 80 -- --   08/26/23 0806 -- 79 17 -- -- -- -- 99 % -- -- --   08/26/23 0800 97.6 °F (36.4 °C) 75 15 107/66 81 118/70 88 mmHg 99 % 90 Ventilator Lying   08/26/23 0700 97.5 °F (36.4 °C) 69 14 104/62 79 120/67 86 mmHg 98 % -- -- --   08/26/23 0630 97.5 °F (36.4 °C) 71 15 -- -- 116/66 84 mmHg 98 % -- -- --   08/26/23 0600 97.5 °F (36.4 °C) 70 15 108/66 81 121/67 86 mmHg 98 % -- -- --     Arterial Line  Bambi BP 94/53  Arterial Line BP  Min: 64/36  Max: 134/68   MAP 69 mmHg  Arterial Line MAP (mmHg)  Min: 46 mmHg  Max: 94 mmHg     Diet Enteral/Parenteral; Tube Feeding No Oral Diet; Jevity 1.2 Scott; Continuous; 70; Prosource Protein Liquid - One Packet Daily; 140; Water; Every 4 hours    Pertinent Labs/Diagnostic Results:   Results from last 7 days   Lab Units 08/24/23  2138   SARS-COV-2  Negative     Results from last 7 days   Lab Units 08/26/23  0541 08/25/23  0600 08/24/23 2024 08/23/23  1435   WBC Thousand/uL 5.80 9.73 15.38* 10.11   HEMOGLOBIN g/dL 10.5* 12.1 13.0 14.3   HEMATOCRIT % 31.4* 36.4* 38.5 41.6   PLATELETS Thousands/uL 206 249 301 358   NEUTROS ABS Thousands/µL  --  7.97*  --  7.95*     Results from last 7 days   Lab Units 08/26/23  0541 08/25/23  0600 08/24/23 2024 08/24/23  0834 08/24/23  0553 08/23/23  2312   SODIUM mmol/L 131* 131* 129* 128* 127* 126*  126*   POTASSIUM mmol/L 3.2* 3.8 3.5 3.7 3.6 3.5  3.5   CHLORIDE mmol/L 90* 88* 86* 83* 83* 80*  81*   CO2 mmol/L 37* 38* 35* 42* 42* 41*  38*   ANION GAP mmol/L 4 5 8 3 2 5  7   BUN mg/dL 7 8 8 7 7 8  8   CREATININE mg/dL 0.88 0.66 0.59* 0.64 0.62 0.80  0.75   EGFR ml/min/1.73sq m 110 124 130 125 127 114  118   CALCIUM mg/dL 8.4 8.8 8.9 8.5 8.4 8.1*  8.2*   CALCIUM, IONIZED mmol/L  --  1.08*  --   --   --   --    MAGNESIUM mg/dL  --  1.8*  --   --   --  2.3     Results from last 7 days   Lab Units 08/26/23  0541 08/25/23  0600 08/23/23  1435   AST U/L 23 33 29   ALT U/L 55* 74* 73*   ALK PHOS U/L 90 113* 123*   TOTAL PROTEIN g/dL 5.5* 6.0* 7.5   ALBUMIN g/dL 3.1* 3.3* 4.2   TOTAL BILIRUBIN mg/dL 0.45 0.53 0.52     Results from last 7 days   Lab Units 08/24/23  1357   POC GLUCOSE mg/dl 208*     Results from last 7 days   Lab Units 08/26/23  0541 08/25/23  0600 08/24/23 2024 08/24/23  0834 08/24/23  0553 08/23/23  2312 08/23/23  1435   GLUCOSE RANDOM mg/dL 104 107 112 97 102 111  109 130     Results from last 7 days   Lab Units 08/23/23  2312   OSMOLALITY, SERUM mmol/*     Results from last 7 days   Lab Units 08/26/23  0816 08/26/23  0434 08/25/23  2356   PH ART  7.424 7.404 7.433   PCO2 ART mm Hg 53.7* 57.4* 54.8*   PO2 ART mm Hg 92.7 107.6 143.5*   HCO3 ART mmol/L 34.4* 35.1* 35.8*   BASE EXC ART mmol/L 8.5 8.7 9.9   O2 CONTENT ART mL/dL 15.4* 15.5* 16.3   O2 HGB, ARTERIAL % 96.3 97.3* 98.1*   ABG SOURCE  Line, Arterial Line, Arterial Line, Arterial     Results from last 7 days   Lab Units 08/24/23  1531 PH SYLVESTER  7.255*   PCO2 SYLVESTER mm Hg 76.1*   PO2 SYLVESTER mm Hg 59.8*   HCO3 SYLVESTER mmol/L 33.0*   BASE EXC SYLVESTER mmol/L 3.4   O2 CONTENT SYLVESTER ml/dL 17.0   O2 HGB, VENOUS % 87.0*     Results from last 7 days   Lab Units 08/23/23  1435   D-DIMER QUANTITATIVE ug/ml FEU 1.11*     Results from last 7 days   Lab Units 08/26/23  0817 08/26/23  0139 08/25/23 2007 08/24/23 0038 08/23/23  1435   PROTIME seconds  --   --   --   --  13.1   INR   --   --   --   --  0.96   PTT seconds 93* 59* 67*   < > 28    < > = values in this interval not displayed.      Results from last 7 days   Lab Units 08/25/23  0600   TSH 3RD GENERATON uIU/mL 2.563     Results from last 7 days   Lab Units 08/25/23  0600 08/24/23 2024   PROCALCITONIN ng/ml 0.58* 0.37*     Results from last 7 days   Lab Units 08/24/23 2024 08/23/23  1444   LACTIC ACID mmol/L 1.0 1.9     Results from last 7 days   Lab Units 08/23/23  1435   BNP pg/mL 9     Results from last 7 days   Lab Units 08/23/23 2329 08/23/23  2312   OSMOLALITY, SERUM mmol/KG  --  267*   OSMO UR mmol/  --      Results from last 7 days   Lab Units 08/24/23 2012 08/23/23 2329   CLARITY UA  Clear  --    COLOR UA  Yellow  --    SPEC GRAV UA  >=1.030  --    PH UA  6.0  --    GLUCOSE UA mg/dl Negative  --    KETONES UA mg/dl Negative  --    BLOOD UA  Negative  --    PROTEIN UA mg/dl 30 (1+)*  --    NITRITE UA  Negative  --    BILIRUBIN UA  Negative  --    UROBILINOGEN UA E.U./dl 1.0  --    LEUKOCYTES UA  Negative  --    WBC UA /hpf 10-20*  --    RBC UA /hpf 0-1  --    BACTERIA UA /hpf Occasional  --    EPITHELIAL CELLS WET PREP /hpf Occasional  --    MUCUS THREADS  Moderate*  --    SODIUM UR   --  40     Results from last 7 days   Lab Units 08/24/23 2138 08/24/23 2013   STREP PNEUMONIAE ANTIGEN, URINE   --  Negative   LEGIONELLA URINARY ANTIGEN   --  Negative   INFLUENZA A PCR  Negative  --    INFLUENZA B PCR  Negative  --    RSV PCR  Negative  --      Results from last 7 days   Lab Units 08/24/23  7148 08/24/23 2029 08/24/23 2025 08/24/23 2012 08/23/23  1444 08/23/23  1435   BLOOD CULTURE   --  No Growth at 24 hrs. No Growth at 24 hrs.  --  No Growth at 48 hrs. No Growth at 48 hrs. SPUTUM CULTURE  Culture too young- will reincubate  --   --   --   --   --    GRAM STAIN RESULT  1+ Epithelial cells per low power field*  3+ Polys*  1+ Gram positive cocci in pairs*  --   --   --   --   --    URINE CULTURE   --   --   --  No Growth <1000 cfu/mL  --   --      Results from last 7 days   Lab Units 08/25/23  1627   TOTAL COUNTED  100     Results from last 7 days   Lab Units 08/26/23  0541   VANCOMYCIN TR ug/mL 11.1     Medications:   Scheduled Medications:  chlorhexidine, 15 mL, Mouth/Throat, Q12H ISAEL  enoxaparin, 1 mg/kg, Subcutaneous, Q12H ISAEL  FLUoxetine, 10 mg, Oral, Daily  omeprazole (PRILOSEC) suspension 2 mg/mL, 20 mg, Oral, Daily  piperacillin-tazobactam, 3.375 g, Intravenous, Q6H  polyethylene glycol, 17 g, Oral, Daily  senna, 17.6 mg, Oral, BID  sodium chloride, 4 mL, Nebulization, Q6H  thiamine, 500 mg, Intravenous, Q8H  vancomycin, 1,250 mg, Intravenous, Q12H    Continuous IV Infusions:  norepinephrine, 1-30 mcg/min, Intravenous, Titrated  propofol, 5-50 mcg/kg/min, Intravenous, Titrated    PRN Meds:  fentanyl citrate (PF), 50 mcg, Intravenous, Q1H PRN    Discharge Plan: [facility]    Network Utilization Review Department  ATTENTION: Please call with any questions or concerns to [phone] and carefully listen to the prompts so that you are directed to the right person. All voicemails are confidential.  Roshan Avendaño all requests for admission clinical reviews, approved or denied determinations and any other requests to dedicated fax number below belonging to the campus where the patient is receiving treatment.  List of dedicated fax numbers for the Facilities:  [place] DENLEN (Administrative/Medical Necessity) [phone] 2303 [facility] (Maternity/NICU/Pediatrics) [phone] [address] [phone] 1505 [facility] [address] [address] [facility] [address] [place], [place] [address] [phone]

## 2023-08-26 NOTE — PROGRESS NOTES
[id number] [facility]  Progress Note  Name: Trung Lerma  MRN: [id number]  Unit/Bed#: ICU 03 I Date of Admission: 8/24/2023   Date of Service: 8/26/2023 I Hospital Day: 2    Assessment/Plan   Acute respiratory failure with hypoxia [facility])  Assessment & Plan  Patient presented with hypoxia, found to have right lower lobe segmental PE. Initially saturating well on nasal cannula. Rapid response was called 8/24 secondary to hypoxia and altered mental status requiring emergent intubation. Hypoxic post intubation requiring high ventilator settings. · CTA PE study 8/23: Pulmonary embolism in the right lower lobe segmental pulmonary artery. Measured RV/LV ratio is within normal limits at less than 0.9.  · Initial CXR post intubation: Near complete opacity of the left lung likely related to worsening infiltrate and/or atelectasis. Patchy right upper lobe infiltrates. · Given rapid onset of acute hypoxia and encephalopathy, concern for possible aspiration event   · On arrival to [facility], episode of hypoxia with sp02 60s despite 100% Fio2 requiring sedation, suctioning for thick secretions  · Repeat CXR on arrival to [facility]: improved aeration on the left side, however continued left lower lobe infiltrate vs atelectasis. Possible central congestion  · Strep pneumo/legionella urine antigen negative  · COVID/flu/RSV negative  · 8/25 Bronchoscopy: poorly tolerated secondary to hypoxia. Some mucus plugging present on the left.      Plan:  · ACVC 16x550, 90, 12  · Peak pressures 28, plat 23, Static compliance 48  · PF ratio up to 114  · Wean Fio2 for goal Sp02 >90  · Q4 hr ABG  · Hypertonic saline nebs given thick secretions  · Chest PT  · Empiric antibiotics with zosyn/vancomycin for possible pneumonia  · Antibiotic day #3  · Consider diuretics for possible component of volume overload  · Pulmonary hygiene  · VAP ppx    Encephalopathy  Assessment & Plan  Patient presented to [facility] on 8/23 with hypoxia and encephalopathy. Today, had acute change in GCS prompting rapid response and subsequent intubation for airway protection. Of note, recent hospitalization 8/11-17 for encephalopathy with unclear etiology despite extensive workup but Wernicke's encephalopathy on the differential  · [facility] hospitalization notable for rapid response and intubation on 8/12 for airway protection with extubation on 8/13. Treated with high dose thiamine. Evaluated by psychiatry for depressed mood, possible bipolar disorder. Discharged to home with his sister with visiting nurses, speech therapy, PT/OT  · [place] workup  · CT head 8/11: No acute intracranial abnormality. Stable small bilateral subcortical hypoattenuating foci. No associated mass effect. · MRI 8/12: No acute intracranial abnormality. Stable nonspecific enhancement along the anterior floor of the third ventricle/hypothalamus when comparing to the March 15, 2023 study. Differential includes chronic toxic metabolic insult (thiamine deficiency), chronic inflammatory/ infectious conditions. Other considerations include Langerhans cell cell histiocytosis, lymphocytic hypophysitis, and neurosyphilis. Primary CNS lymphoma is a less likely consideration, particularly given the lack of interval change. Serologic correlation is recommended. Stable cerebral white matter signal abnormality, presumably the sequela of chronic microangiopathic disease given the patient's history  · VEEG: negative for seizure activity   · LP: meningitis/encephalitis panel negative, culture negative, lyme and cryptococcal negative, paraneoplastic and autoimmune CSF studies still pending (sent to [facility])  · 8/24: patient's girlfriend visited [address] [place] [address] and found him unresponsive. Notes said that 'eyes were rolled back with secretions around his mouth". Rapid response was called and he was intubated for hypoxia and low GCS. VBG ?  Post intubation 7.25/76/59/33  · CT head 8/24: No acute intracranial abnormality. · On arrival to [facility], patient sedated on fentanyl, agitated and moving all extremities. Was able to follow commands with extremities prior to fentanyl bolus. · 8/25 EEG: This Routine EEG recorded during wakefulness, drowsiness, and sleep is abnormal due to presence of mild background slowing suggestive of underlying cerebral dysfunction from toxic/metablic/infectious/hypoxic encephalopathy. There is no evidence of electrographic seizures. Clinical correlation is recommended. Plan:  · Encephalopathy likely multifactorial in the setting of abnormal (however stable) MRI showing possible wernicke's encephalopathy, hypercarbia, hyponatremia  · Recommend BiPAP vs CPAP HS while inpatient once extubated. Patient would benefit from outpatient PFTs and possible CPAP HS  · Continue thiamine dose to 500mg Q8   · Follow up previous LP send out studies, low utility for repeat LP  · Neurology consult, appreciate recommendations  · Hold all sedating meds  · CAM ICU  · Sleep hygiene  · Neurochecks per routine     Sepsis [facility])  Assessment & Plan  Sepsis present on arrival to [facility] by leukocytosis, temp 96.4 with worsening hypoxia requiring intubation. · Thick, yellow/white secretions suctioned via ETT on arrival  · CXR with concern for left lower lobe infiltrate. Patient does have aspiration risk factors with encephalopathy  · Sputum culture, pending  · Strep pneumo/legionella urine antigens negative  · COVID/flu/RSV negative  · Blood cultures pending  · UA unremarkable   · LA 1  · procal 0.37  · LP on previous admission negative, low utility for repeating LP at this time   · Patient hypotensive on arrival, but likely iatrogenic seconadary to sedation use. Levophed now off.  Will defer fluids given profound hypoxia on high ventilator settings, possible component of volume overload on CXR    Plan:  · Zosyn/vanco  · Antibiotic day #3  · Follow culture data  · Trend WBC, fever curve and procal     Acute pulmonary embolism without acute cor pulmonale (HCC)  Assessment & Plan  Presented with hypoxia, initially admitted to the floor on supplemental NC  · CTA PE: Pulmonary embolism in the right lower lobe segmental pulmonary artery. Measured RV/LV ratio is within normal limits at less than 0.9. · BNP 9, initial HS troponin 9  · Initiated on heparin gtt  · Developed worsening hypoxia 8/24, rapid response was called and patient was intubated for airway protection and hypoxic  · Now requiring high vent settings  · Echo completed 8/25: read pending. RV function appears grossly normal    Plan:  · Continue heparin gtt  · Consider venous duplex     Depression  Assessment & Plan  · Continue prozac  · Evaluated by psychiatry last admission, felt to have depressive mood disorder    Hyponatremia  Assessment & Plan  · Sodium 124 on admission, now up to 128 s/p NSS infusion at 50mL/hr  · 8/23: serum osmo 267, urine osmo 335, urine sodium 40 (on demadex PTA)  · Hold NSS for now  · Repeat BMP now 131  · Continue to closely trend BMP      LVH (left ventricular hypertrophy)  Assessment & Plan  · Previous echocardiograms demonstrate EF 06%, normal diastolic dysfunction, mild left ventricular hypertrophy, mild RVSP elevation and mild TR  · PTA: demadex 20mg daily  · Evaluated by Heart Failure team in July of this year. LVH thought to be secondary to hypertension, obesity, hx of meth use  · Recommended sleep study at that time   · Echo read pending    Seminoma of left testis [facility])  Assessment & Plan  · Testicular cancer s/p left radial orchiectomy on 12/2022. Repeat CT scan in January 2023 with enlarging lymph node. Received Etoposide and cistoplatin chemotherapy in March, developed numbness/tingling and vision changes, labile affect and right sided weakness and therefore chemotherapy was stopped.  MRI demonstrated white matter changes at that time and he was referred to Neurology   · Continue outpatient follow up with Heme/Onc Hypertension  Assessment & Plan  · Hx of hypertension with evidence of LVH on echocardiogram  · PTA meds: demadex only  · Hypotension s/p intubation     Umbilical hernia without obstruction and without gangrene  Assessment & Plan  · Umbilical hernia present, easily reducible            ICU Core Measures     Vented Patient  VAP Bundle  VAP bundle ordered     A: Assess, Prevent, and Manage Pain · Has pain been assessed? Yes  · Need for changes to pain regimen? No   B: Both Spontaneous Awakening Trials (SATs) and Spontaneous Breathing Trials (SBTs) · Plan to perform spontaneous awakening trial today? Yes   · Plan to perform spontaneous breathing trial today? Yes   · Obvious barriers to extubation? Yes   C: Choice of Sedation · RASS Goal: -2 Light Sedation  · Need for changes to sedation or analgesia regimen? No   D: Delirium · CAM-ICU: Unable to perform secondary to Acute cognitive dysfunction   E: Early Mobility  · Plan for early mobility? No   F: Family Engagement · Plan for family engagement today? Yes       Antibiotic Review: Continue broad spectrum secondary to severity of illness. Review of Invasive Devices: [place] Plan: Continue for accurate I/O monitoring for 48 hours    Bambi Plan: Keep arterial line for hemodynamic monitoring and frequent ABGs    Prophylaxis:  VTE VTE covered by:  heparin (porcine), Intravenous, 9 Units/kg/hr at 08/25/23 1321  heparin (porcine), Intravenous  heparin (porcine), Intravenous       Stress Ulcer  covered byomeprazole (PRILOSEC) suspension 2 mg/mL [[id number]]       Subjective   HPI/24hr events:     Attempted toilet bronchoscopy but had poor tolerance secondary to hypoxia. Secretions noted in left lower lobes. Oxygenation slowly improving throughout the day. Continues to be arousable, following commands weakly to stimulation. EEG showed  Mild background slowing suggestive of cerebral dysfunction from toxic/metabolic encephalopathy. No electrographic seizure.  Overnight, PF ratio improved to 147. Fio2 decreased to 90% from 100%. Otherwise, no acute events. Review of Systems   Unable to perform ROS: Intubated        Objective                            Vitals I/O      Most Recent Min/Max in 24hrs   Temp 97.8 °F (36.6 °C) Temp  Min: 96.6 °F (35.9 °C)  Max: 98.2 °F (36.8 °C)   Pulse 81 Pulse  Min: 70  Max: 89   Resp 16 Resp  Min: 11  Max: 24   /67 BP  Min: 82/50  Max: 128/71   O2 Sat 100 % SpO2  Min: 91 %  Max: 100 %      Intake/Output Summary (Last 24 hours) at 2023 0031  Last data filed at 2023 0000  Gross per 24 hour   Intake 2684.22 ml   Output 2400 ml   Net 284.22 ml         Diet Enteral/Parenteral; Tube Feeding No Oral Diet; Jevity 1.2 Scott; Continuous; 70; Prosource Protein Liquid - One Packet Daily; 140; Water; Every 4 hours     Invasive Monitoring Physical exam   Arterial Line  Bambi BP 94/53  Arterial Line BP  Min: 64/36  Max: 134/68   MAP 69 mmHg  Arterial Line MAP (mmHg)  Min: 46 mmHg  Max: 94 mmHg    Physical Exam  Eyes:      Pupils: Pupils are equal, round, and reactive to light. Skin:     General: Skin is warm and dry. Coloration: Skin is not jaundiced. Comments: Right chest port    HENT:      Head: Normocephalic and atraumatic. Cardiovascular:      Rate and Rhythm: Normal rate. Pulses: Pulses are Pedal edema present. Musculoskeletal:      Right lower le+ Edema present. Left lower le+ Edema present. Abdominal:      General: Bowel sounds are normal. There is distension. Palpations: Abdomen is soft. Tenderness: There is no abdominal tenderness. Hernia: A hernia is present. Hernia is present in the umbilical area. Comments: Abdomen distended    Constitutional:       General: He is not in acute distress. Appearance: He is well-developed. He is obese. Interventions: He is sedated and intubated.    Pulmonary:      Effort: Pulmonary effort is normal. No tachypnea, accessory muscle usage or accessory muscle usage. He is intubated. Breath sounds: Decreased breath sounds present. No wheezing, rhonchi or rales. Neurological:      Comments: Weakly follows commands with all extremities when stimulated            Diagnostic Studies        Imaging:  I have personally reviewed pertinent reports. Medications:  Scheduled PRN   chlorhexidine, 15 mL, Q12H [facility]  FLUoxetine, 10 mg, Daily  omeprazole (PRILOSEC) suspension 2 mg/mL, 20 mg, Daily  piperacillin-tazobactam, 3.375 g, Q6H  sodium chloride, 4 mL, Q6H  thiamine, 500 mg, Q8H  vancomycin, 1,250 mg, Q12H      fentanyl citrate (PF), 50 mcg, Q1H PRN  heparin (porcine), 10,000 Units, Q6H PRN  heparin (porcine), 5,000 Units, Q6H PRN       Continuous    heparin (porcine), 3-30 Units/kg/hr (Order-Specific), Last Rate: 9 Units/kg/hr (08/25/23 1321)  norepinephrine, 1-30 mcg/min, Last Rate: 1 mcg/min (08/25/23 2200)  propofol, 5-50 mcg/kg/min, Last Rate: 10 mcg/kg/min (08/25/23 2300)         Labs:    CBC    Recent Labs     08/24/23 2024 08/25/23  0600   WBC 15.38* 9.73   HGB 13.0 12.1   HCT 38.5 36.4*    249     BMP    Recent Labs     08/24/23 2024 08/25/23  0600   SODIUM 129* 131*   K 3.5 3.8   CL 86* 88*   CO2 35* 38*   AGAP 8 5   BUN 8 8   CREATININE 0.59* 0.66   CALCIUM 8.9 8.8       Coags    Recent Labs     08/25/23  1220 08/25/23 2007   PTT 99* 67*        Additional Electrolytes  Recent Labs     08/25/23  0600   MG 1.8*   CAIONIZED 1.08*          Blood Gas    Recent Labs     08/25/23  2356   PHART 7.433   PQG2NZC 54.8*   PO2ART 143.5*   MVC6SIR 35.8*   BEART 9.9   SOURCE Line, Arterial     Recent Labs     08/24/23  1534 08/24/23 2028 08/25/23  2356   PHVEN 7.255*  --   --    IRM3JCZ 76.1*  --   --    PO2VEN 59.8*  --   --    EBO2QUA 33.0*  --   --    BEVEN 3.4  --   --    SOURCE  --    < > Line, Arterial    < > = values in this interval not displayed.     LFTs  Recent Labs     08/25/23  0600   ALT 74*   AST 33   ALKPHOS 113*   ALB 3.3* TBILI 0.53       Infectious  Recent Labs     08/24/23 2024 08/25/23  0600   PROCALCITONI 0.37* 0.58*     Glucose  Recent Labs     08/24/23  0553 08/24/23  0834 08/24/23 2024 08/25/23  0600   GLUC 102 97 112 107               Critical Care Time Delivered: Upon my evaluation, this patient had a high probability of imminent or life-threatening deterioration due to acute hypoxic resp failure, which required my direct attention, intervention, and personal management. I have personally provided 30 minutes of critical care time, exclusive of procedures, teaching, family meetings, and any prior time recorded by providers other than myself.      Orlin Curran

## 2023-08-26 NOTE — ASSESSMENT & PLAN NOTE
· Sodium 124 on admission, now up to 128 s/p NSS infusion at 50mL/hr  · 8/23: serum osmo 267, urine osmo 335, urine sodium 40 (on demadex PTA)  · Hold NSS for now  · Repeat BMP now 131  · Continue to closely trend BMP

## 2023-08-26 NOTE — PROGRESS NOTES
Nino [facility] is a 41 y.o. male who is currently ordered Vancomycin IV with management by the Pharmacy Consult service. Relevant clinical data and objective / subjective history reviewed. Vancomycin Assessment:  Indication and Goal AUC/Trough: Pneumonia (goal -600, trough >10), -600, trough >10  Clinical Status: stable  Micro:     Renal Function:  SCr: 0.88 mg/dL  CrCl: 177.3 mL/min  Renal replacement: Not on dialysis  Days of Therapy: 3  Current Dose: 1500mg IV q12h  Vancomycin Plan:  New Dosinmg IV q12h  Estimated AUC: 410 mcg*hr/mL  Estimated Trough: 13.6 mcg/mL  Next Level: 23 @ 0600    Renal Function Monitoring: Daily BMP and [place] will continue to follow closely for s/sx of nephrotoxicity, infusion reactions and appropriateness of therapy. BMP and CBC will be ordered per protocol. We will continue to follow the patient’s culture results and clinical progress daily.     José Qureshi, Pharmacist

## 2023-08-26 NOTE — ASSESSMENT & PLAN NOTE
Presented with hypoxia, initially admitted to the floor on supplemental NC  · CTA PE: Pulmonary embolism in the right lower lobe segmental pulmonary artery. Measured RV/LV ratio is within normal limits at less than 0.9. · BNP 9, initial HS troponin 9  · Initiated on heparin gtt  · Developed worsening hypoxia 8/24, rapid response was called and patient was intubated for airway protection and hypoxic  · Now requiring high vent settings  · Echo completed 8/25: read pending.  RV function appears grossly normal    Plan:  · Continue heparin gtt  · Consider venous duplex

## 2023-08-26 NOTE — ASSESSMENT & PLAN NOTE
Sepsis present on arrival to [facility] by leukocytosis, temp 96.4 with worsening hypoxia requiring intubation. · Thick, yellow/white secretions suctioned via ETT on arrival  · CXR with concern for left lower lobe infiltrate. Patient does have aspiration risk factors with encephalopathy  · Sputum culture, pending  · Strep pneumo/legionella urine antigens negative  · COVID/flu/RSV negative  · Blood cultures pending  · UA unremarkable   · LA 1  · procal 0.37  · LP on previous admission negative, low utility for repeating LP at this time   · Patient hypotensive on arrival, but likely iatrogenic seconadary to sedation use. Levophed now off.  Will defer fluids given profound hypoxia on high ventilator settings, possible component of volume overload on CXR    Plan:  · Zosyn/vanco  · Antibiotic day #3  · Follow culture data  · Trend WBC, fever curve and procal

## 2023-08-27 LAB
ANION GAP SERPL CALCULATED.3IONS-SCNC: 4 MMOL/L
BACTERIA BLD CULT: NORMAL
BACTERIA BLD CULT: NORMAL
BASE EXCESS BLDA CALC-SCNC: 5 MMOL/L
BASOPHILS # BLD AUTO: 0.04 THOUSANDS/ÂΜL (ref 0–0.1)
BASOPHILS NFR BLD AUTO: 1 % (ref 0–1)
BODY TEMPERATURE: 98.2 DEGREES FEHRENHEIT
BUN SERPL-MCNC: 8 MG/DL (ref 5–25)
CALCIUM SERPL-MCNC: 8.6 MG/DL (ref 8.4–10.2)
CHLORIDE SERPL-SCNC: 94 MMOL/L (ref 96–108)
CO2 SERPL-SCNC: 35 MMOL/L (ref 21–32)
CREAT SERPL-MCNC: 0.8 MG/DL (ref 0.6–1.3)
EOSINOPHIL # BLD AUTO: 0.11 THOUSAND/ÂΜL (ref 0–0.61)
EOSINOPHIL NFR BLD AUTO: 2 % (ref 0–6)
ERYTHROCYTE [DISTWIDTH] IN BLOOD BY AUTOMATED COUNT: 14.6 % (ref 11.6–15.1)
GFR SERPL CREATININE-BSD FRML MDRD: 114 ML/MIN/1.73SQ M
GLUCOSE SERPL-MCNC: 102 MG/DL (ref 65–140)
HCO3 BLDA-SCNC: 30.3 MMOL/L (ref 22–28)
HCT VFR BLD AUTO: 32.2 % (ref 36.5–49.3)
HGB BLD-MCNC: 10.3 G/DL (ref 12–17)
HOROWITZ INDEX BLDA+IHG-RTO: 70 MM[HG]
IMM GRANULOCYTES # BLD AUTO: 0.03 THOUSAND/UL (ref 0–0.2)
IMM GRANULOCYTES NFR BLD AUTO: 1 % (ref 0–2)
LYMPHOCYTES # BLD AUTO: 1.02 THOUSANDS/ÂΜL (ref 0.6–4.47)
LYMPHOCYTES NFR BLD AUTO: 17 % (ref 14–44)
MAGNESIUM SERPL-MCNC: 2 MG/DL (ref 1.9–2.7)
MCH RBC QN AUTO: 31.8 PG (ref 26.8–34.3)
MCHC RBC AUTO-ENTMCNC: 32 G/DL (ref 31.4–37.4)
MCV RBC AUTO: 99 FL (ref 82–98)
MONOCYTES # BLD AUTO: 0.59 THOUSAND/ÂΜL (ref 0.17–1.22)
MONOCYTES NFR BLD AUTO: 10 % (ref 4–12)
NEUTROPHILS # BLD AUTO: 4.25 THOUSANDS/ÂΜL (ref 1.85–7.62)
NEUTS SEG NFR BLD AUTO: 69 % (ref 43–75)
NRBC BLD AUTO-RTO: 0 /100 WBCS
O2 CT BLDA-SCNC: 15.2 ML/DL (ref 16–23)
OXYHGB MFR BLDA: 97.7 % (ref 94–97)
PCO2 BLDA: 48.4 MM HG (ref 36–44)
PCO2 TEMP ADJ BLDA: 48 MM HG (ref 36–44)
PEEP RESPIRATORY: 12 CM[H2O]
PH BLD: 7.42 [PH] (ref 7.35–7.45)
PH BLDA: 7.42 [PH] (ref 7.35–7.45)
PHOSPHATE SERPL-MCNC: 3.7 MG/DL (ref 2.7–4.5)
PLATELET # BLD AUTO: 211 THOUSANDS/UL (ref 149–390)
PMV BLD AUTO: 8.8 FL (ref 8.9–12.7)
PO2 BLD: 113.4 MM HG (ref 75–129)
PO2 BLDA: 114.6 MM HG (ref 75–129)
POTASSIUM SERPL-SCNC: 3.7 MMOL/L (ref 3.5–5.3)
RBC # BLD AUTO: 3.24 MILLION/UL (ref 3.88–5.62)
SODIUM SERPL-SCNC: 133 MMOL/L (ref 135–147)
SPECIMEN SOURCE: ABNORMAL
THYROPEROXIDASE AB SERPL-ACNC: <9 IU/ML (ref 0–34)
VENT AC: 14
VENT- AC: AC
VT SETTING VENT: 500 ML
WBC # BLD AUTO: 6.04 THOUSAND/UL (ref 4.31–10.16)

## 2023-08-27 PROCEDURE — 94150 VITAL CAPACITY TEST: CPT

## 2023-08-27 PROCEDURE — 84100 ASSAY OF PHOSPHORUS: CPT | Performed by: PHYSICIAN ASSISTANT

## 2023-08-27 PROCEDURE — 99291 CRITICAL CARE FIRST HOUR: CPT | Performed by: ANESTHESIOLOGY

## 2023-08-27 PROCEDURE — 80048 BASIC METABOLIC PNL TOTAL CA: CPT | Performed by: PHYSICIAN ASSISTANT

## 2023-08-27 PROCEDURE — 94669 MECHANICAL CHEST WALL OSCILL: CPT

## 2023-08-27 PROCEDURE — 94003 VENT MGMT INPAT SUBQ DAY: CPT

## 2023-08-27 PROCEDURE — 82805 BLOOD GASES W/O2 SATURATION: CPT | Performed by: PHYSICIAN ASSISTANT

## 2023-08-27 PROCEDURE — 94668 MNPJ CHEST WALL SBSQ: CPT

## 2023-08-27 PROCEDURE — 94640 AIRWAY INHALATION TREATMENT: CPT

## 2023-08-27 PROCEDURE — 94760 N-INVAS EAR/PLS OXIMETRY 1: CPT

## 2023-08-27 PROCEDURE — 85025 COMPLETE CBC W/AUTO DIFF WBC: CPT | Performed by: PHYSICIAN ASSISTANT

## 2023-08-27 PROCEDURE — 83735 ASSAY OF MAGNESIUM: CPT | Performed by: PHYSICIAN ASSISTANT

## 2023-08-27 RX ORDER — LANOLIN ALCOHOL/MO/W.PET/CERES
100 CREAM (GRAM) TOPICAL DAILY
Status: DISCONTINUED | OUTPATIENT
Start: 2023-08-28 | End: 2023-09-23 | Stop reason: HOSPADM

## 2023-08-27 RX ORDER — BISACODYL 10 MG
10 SUPPOSITORY, RECTAL RECTAL ONCE
Status: COMPLETED | OUTPATIENT
Start: 2023-08-27 | End: 2023-08-27

## 2023-08-27 RX ORDER — FUROSEMIDE 10 MG/ML
40 INJECTION INTRAMUSCULAR; INTRAVENOUS ONCE
Status: COMPLETED | OUTPATIENT
Start: 2023-08-27 | End: 2023-08-27

## 2023-08-27 RX ADMIN — FENTANYL CITRATE 50 MCG: 50 INJECTION, SOLUTION INTRAMUSCULAR; INTRAVENOUS at 21:27

## 2023-08-27 RX ADMIN — SODIUM CHLORIDE SOLN NEBU 3% 4 ML: 3 NEBU SOLN at 07:28

## 2023-08-27 RX ADMIN — Medication 20 MG: at 08:24

## 2023-08-27 RX ADMIN — CHLORHEXIDINE GLUCONATE 15 ML: 1.2 RINSE ORAL at 08:24

## 2023-08-27 RX ADMIN — FENTANYL CITRATE 50 MCG: 50 INJECTION, SOLUTION INTRAMUSCULAR; INTRAVENOUS at 01:46

## 2023-08-27 RX ADMIN — PIPERACILLIN AND TAZOBACTAM 3.38 G: 36; 4.5 INJECTION, POWDER, FOR SOLUTION INTRAVENOUS at 15:49

## 2023-08-27 RX ADMIN — SODIUM CHLORIDE SOLN NEBU 3% 4 ML: 3 NEBU SOLN at 20:15

## 2023-08-27 RX ADMIN — FLUOXETINE 10 MG: 10 CAPSULE ORAL at 08:28

## 2023-08-27 RX ADMIN — PIPERACILLIN AND TAZOBACTAM 3.38 G: 36; 4.5 INJECTION, POWDER, FOR SOLUTION INTRAVENOUS at 10:03

## 2023-08-27 RX ADMIN — SENNOSIDES 17.6 MG: 8.8 SYRUP ORAL at 08:25

## 2023-08-27 RX ADMIN — ENOXAPARIN SODIUM 135 MG: 150 INJECTION SUBCUTANEOUS at 08:30

## 2023-08-27 RX ADMIN — PIPERACILLIN AND TAZOBACTAM 3.38 G: 36; 4.5 INJECTION, POWDER, FOR SOLUTION INTRAVENOUS at 21:30

## 2023-08-27 RX ADMIN — PIPERACILLIN AND TAZOBACTAM 3.38 G: 36; 4.5 INJECTION, POWDER, FOR SOLUTION INTRAVENOUS at 04:24

## 2023-08-27 RX ADMIN — MAGNESIUM HYDROXIDE 30 ML: 400 SUSPENSION ORAL at 10:02

## 2023-08-27 RX ADMIN — SODIUM CHLORIDE SOLN NEBU 3% 4 ML: 3 NEBU SOLN at 13:04

## 2023-08-27 RX ADMIN — THIAMINE HYDROCHLORIDE 500 MG: 100 INJECTION, SOLUTION INTRAMUSCULAR; INTRAVENOUS at 04:24

## 2023-08-27 RX ADMIN — POLYETHYLENE GLYCOL 3350 17 G: 17 POWDER, FOR SOLUTION ORAL at 08:25

## 2023-08-27 RX ADMIN — FUROSEMIDE 40 MG: 10 INJECTION, SOLUTION INTRAVENOUS at 09:48

## 2023-08-27 RX ADMIN — ENOXAPARIN SODIUM 135 MG: 150 INJECTION SUBCUTANEOUS at 21:09

## 2023-08-27 RX ADMIN — ACETAMINOPHEN 650 MG: 650 SUSPENSION ORAL at 15:26

## 2023-08-27 RX ADMIN — SENNOSIDES 17.6 MG: 8.8 SYRUP ORAL at 17:35

## 2023-08-27 RX ADMIN — CHLORHEXIDINE GLUCONATE 15 ML: 1.2 RINSE ORAL at 21:09

## 2023-08-27 RX ADMIN — BISACODYL 10 MG: 10 SUPPOSITORY RECTAL at 10:02

## 2023-08-27 RX ADMIN — SODIUM CHLORIDE SOLN NEBU 3% 4 ML: 3 NEBU SOLN at 02:13

## 2023-08-27 NOTE — ASSESSMENT & PLAN NOTE
· Previous echocardiograms demonstrate EF 85%, normal diastolic dysfunction, mild left ventricular hypertrophy, mild RVSP elevation and mild TR  · PTA: demadex 20mg daily  · Evaluated by Heart Failure team in July of this year.  LVH thought to be secondary to hypertension, obesity, hx of meth use  · Recommended sleep study at that time   · Echo this admission shows normal EF of 60%, normal LV thickness, no diastolic dysfunction

## 2023-08-27 NOTE — PROGRESS NOTES
The patient's vancomycin therapy has been discontinued. Pharmacy will sign off now. Thank you for this consult.

## 2023-08-27 NOTE — PROGRESS NOTES
[id number] [facility]  Progress Note  Name: Everett Neville  MRN: [id number]  Unit/Bed#: ICU 03 I Date of Admission: 8/24/2023   Date of Service: 8/27/2023 I Hospital Day: 3    Assessment/Plan   Acute respiratory failure with hypoxia [facility])  Assessment & Plan  Patient presented with hypoxia, found to have right lower lobe segmental PE. Initially saturating well on nasal cannula. Rapid response was called 8/24 secondary to hypoxia and altered mental status requiring emergent intubation. Hypoxic post intubation requiring high ventilator settings. · CTA PE study 8/23: Pulmonary embolism in the right lower lobe segmental pulmonary artery. Measured RV/LV ratio is within normal limits at less than 0.9.  · Initial CXR post intubation: Near complete opacity of the left lung likely related to worsening infiltrate and/or atelectasis. Patchy right upper lobe infiltrates. · Strep pneumo/legionella urine antigen negative  · COVID/flu/RSV negative  · 8/25 Bronchoscopy: poorly tolerated secondary to hypoxia. Some mucus plugging present on the left  · 8/26: CT chest Complete right lower lobe and near complete left lower lobe atelectasis. Pneumonia not excluded in the appropriate clinical setting. Mild interstitial edema with trace effusions. Plan:  · ACVC 16x500, 80, 12  · Wean Fio2 for goal Sp02 >90  · Hypertonic saline nebs given thick secretions  · Chest PT  · Empiric antibiotics with zosyn (vanco D/C'ed with negative MRSA surveillance) for possible pneumonia  · Antibiotic day #4  · Consider repeat bronch today   · Pulmonary hygiene  · VAP ppx    Encephalopathy  Assessment & Plan  [facility] and encephal[facility]. Today, had acute change in GCS prompting rapid response and subsequent intubation for airway protection.  Of note, recent hospitalization 8/11-17 for encephalopathy with unclear etiology despite extensive workup but Wernicke's encephalopathy on the differential  · [facility] hospitalization notable for rapid response and intubation on 8/12 for airway protection with extubation on 8/13. Treated with high dose thiamine. Evaluated by psychiatry for depressed mood, possible bipolar disorder. Discharged to home with his sister with visiting nurses, speech therapy, PT/OT  · [place] workup  · CT head 8/11: No acute intracranial abnormality. Stable small bilateral subcortical hypoattenuating foci. No associated mass effect. · MRI 8/12: No acute intracranial abnormality. Stable nonspecific enhancement along the anterior floor of the third ventricle/hypothalamus when comparing to the March 15, 2023 study. Differential includes chronic toxic metabolic insult (thiamine deficiency), chronic inflammatory/ infectious conditions. Other considerations include Langerhans cell cell histiocytosis, lymphocytic hypophysitis, and neurosyphilis. Primary CNS lymphoma is a less likely consideration, particularly given the lack of interval change. Serologic correlation is recommended. Stable cerebral white matter signal abnormality, presumably the sequela of chronic microangiopathic disease given the patient's history  · VEEG: negative for seizure activity   · LP: meningitis/encephalitis panel negative, culture negative, lyme and cryptococcal negative, paraneoplastic and autoimmune CSF studies still pending (sent to [facility])  · 8/24: patient's girlfriend visited [address] [place] [address] [place] and found him unresponsive. Notes said that 'eyes were rolled back with secretions around his mouth". Rapid response was called and he was intubated for hypoxia and low GCS. VBG ? Post intubation 7.25/76/59/33  · CT head 8/24: No acute intracranial abnormality  · 8/25 EEG: This Routine EEG recorded during wakefulness, drowsiness, and sleep is abnormal due to presence of mild background slowing suggestive of underlying cerebral dysfunction from toxic/metablic/infectious/hypoxic encephalopathy.  There is no evidence of electrographic seizures. Clinical correlation is recommended  · Neuro exam: wakes to voice, weakly follows one step commands with all extremities     Plan:  · Encephalopathy likely multifactorial in the setting of abnormal (however stable) MRI showing possible wernicke's encephalopathy, hypercarbia, hyponatremia  · Recommend BiPAP vs CPAP HS while inpatient once extubated. Patient would benefit from outpatient PFTs and possible CPAP HS  · Continue thiamine dose to 500mg Q8   · Follow up previous LP send out studies, low utility for repeat LP  · Neurology consult, appreciate recommendations  · Limit sedating meds, currently on no sedation   · CAM ICU  · Sleep hygiene  · Neurochecks per routine     Sepsis [facility])  Assessment & Plan  Sepsis vs SIRs present on arrival to [facility] by leukocytosis, temp 96.4 with worsening hypoxia requiring intubation. · Thick, yellow/white secretions suctioned via ETT on arrival  · CXR with concern for left lower lobe infiltrate. Patient does have aspiration risk factors with encephalopathy  · Sputum culture, pending  · Strep pneumo/legionella urine antigens negative  · COVID/flu/RSV negative  · Blood cultures negative thus far  · UA unremarkable   · LA 1  · procal 0.37 > 0.58  · LP on previous admission negative, low utility for repeating LP at this time   · Patient hypotensive on arrival, but likely iatrogenic seconadary to sedation use. Levophed now off. Will defer fluids given profound hypoxia on high ventilator settings, possible component of volume overload on CXR    Plan:  · Zosyn (vanco D/C'ed 8/26 after negative MRSA swab)  · Antibiotic day #4  · Follow culture data  · Trend WBC, fever curve and procal     Acute pulmonary embolism without acute cor pulmonale (HCC)  Assessment & Plan  Presented with hypoxia, initially admitted to the floor on supplemental NC  · CTA PE: Pulmonary embolism in the right lower lobe segmental pulmonary artery.  Measured RV/LV ratio is within normal limits at less than 0.9. · BNP 9, initial HS troponin 9  · Initiated on heparin gtt  · Developed worsening hypoxia 8/24, rapid response was called and patient was intubated for airway protection and hypoxic  · Now requiring high vent setting  · Echo completed 8/25: Left Ventricle: Left ventricular cavity size is normal. Wall thickness is normal. The left ventricular ejection fraction is 60% by visual estimation. . Wall motion is normal. Diastolic function is normal for age. Right Ventricle: Right ventricular cavity size is normal. Systolic function is normal. Normal tricuspid annular plane systolic excursion (TAPSE) > 1.7 cm. Plan:  · Heparin converted to therapeutic lovenox on 8/26    Depression  Assessment & Plan  · Continue prozac  · Evaluated by psychiatry last admission, felt to have depressive mood disorder    Hyponatremia  Assessment & Plan  · Sodium 124 on admission, now up to 128 s/p NSS infusion at 50mL/hr  · 8/23: serum osmo 267, urine osmo 335, urine sodium 40 (on demadex PTA)  · Hold NSS for now  · Sodium now 133  · Continue to closely trend BMP      LVH (left ventricular hypertrophy)  Assessment & Plan  · Previous echocardiograms demonstrate EF 89%, normal diastolic dysfunction, mild left ventricular hypertrophy, mild RVSP elevation and mild TR  · PTA: demadex 20mg daily  · Evaluated by Heart Failure team in July of this year. LVH thought to be secondary to hypertension, obesity, hx of meth use  · Recommended sleep study at that time   · Echo this admission shows normal EF of 60%, normal LV thickness, no diastolic dysfunction    Seminoma of left testis [facility])  Assessment & Plan  · Testicular cancer s/p left radial orchiectomy on 12/2022. Repeat CT scan in January 2023 with enlarging lymph node. Received Etoposide and cistoplatin chemotherapy in March, developed numbness/tingling and vision changes, labile affect and right sided weakness and therefore chemotherapy was stopped.  MRI demonstrated white matter changes at that time and he was referred to Neurology   · Continue outpatient follow up with Heme/Onc     Hypertension  Assessment & Plan  · Hx of hypertension with evidence of LVH on echocardiogram  · PTA meds: demadex only  · Hypotension s/p intubation     Umbilical hernia without obstruction and without gangrene  Assessment & Plan  · Umbilical hernia present, easily reducible              ICU Core Measures     Vented Patient  VAP Bundle  VAP bundle ordered     A: Assess, Prevent, and Manage Pain · Has pain been assessed? Yes  · Need for changes to pain regimen? No   B: Both Spontaneous Awakening Trials (SATs) and Spontaneous Breathing Trials (SBTs) · Plan to perform spontaneous awakening trial today? Yes   · Plan to perform spontaneous breathing trial today? Yes   · Obvious barriers to extubation? Yes   C: Choice of Sedation · RASS Goal: 0 Alert and Calm  · Need for changes to sedation or analgesia regimen? NA   D: Delirium · CAM-ICU: Unable to perform secondary to Acute cognitive dysfunction   E: Early Mobility  · Plan for early mobility? No   F: Family Engagement · Plan for family engagement today? Yes       Antibiotic Review: Awaiting culture results. Review of Invasive Devices:        Bambi Plan: Keep arterial line for hemodynamic monitoring and frequent ABGs    Prophylaxis:  VTE VTE covered by:  enoxaparin, Subcutaneous, 135 mg at 08/26/23 2058       Stress Ulcer  covered byomeprazole (PRILOSEC) suspension 2 mg/mL [[id number]]       Subjective   HPI/24hr events: Vent weaned down to 80%/12PEEP. Now off sedation. CT scan showing complete right lower lobe and near complete left lower love atelectasis, pneumonia not excluded. Given 40mg IV lasix yesterday with good response. Otherwise, no acute events overnight.           Objective                            Vitals I/O      Most Recent Min/Max in 24hrs   Temp 98.6 °F (37 °C) Temp  Min: 97.3 °F (36.3 °C)  Max: 100 °F (37.8 °C)   Pulse 79 Pulse  Min: 68  Max: 92   Resp 14 Resp  Min: 14  Max: 21   /67 BP  Min: 101/62  Max: 119/77   O2 Sat 100 % SpO2  Min: 94 %  Max: 100 %      Intake/Output Summary (Last 24 hours) at 2023 0054  Last data filed at 2023 0001  Gross per 24 hour   Intake 4108.79 ml   Output 2580 ml   Net 1528.79 ml         Diet Enteral/Parenteral; Tube Feeding No Oral Diet; Jevity 1.2 Scott; Continuous; 70; Prosource Protein Liquid - One Packet Daily; 140; Water; Every 4 hours     Invasive Monitoring Physical exam   Arterial Line  [place] /74  Arterial Line BP  Min: 97/56  Max: 135/74   MAP 94 mmHg  Arterial Line MAP (mmHg)  Min: 71 mmHg  Max: 95 mmHg    Physical Exam  Eyes:      Extraocular Movements: Extraocular movements intact. Pupils: Pupils are equal, round, and reactive to light. Skin:     General: Skin is warm and dry. Coloration: Skin is not jaundiced. HENT:      Head: Normocephalic and atraumatic. Mouth/Throat:      Mouth: Mucous membranes are moist.   Cardiovascular:      Rate and Rhythm: Normal rate and regular rhythm. Heart sounds: Normal heart sounds. Musculoskeletal:      Right lower le+ Edema present. Left lower le+ Edema present. Comments: Pedal edema   Abdominal:      General: Abdomen is protuberant. Bowel sounds are normal.      Palpations: Abdomen is soft. Tenderness: There is no abdominal tenderness. Hernia: A hernia is present. Hernia is present in the umbilical area. Constitutional:       Appearance: He is obese. Interventions: He is intubated. Pulmonary:      Effort: Pulmonary effort is normal. No accessory muscle usage, respiratory distress or accessory muscle usage. He is intubated. Breath sounds: Normal breath sounds. No wheezing. Neurological:      Mental Status: He is lethargic. GCS: GCS eye subscore is 3. GCS verbal subscore is 1. GCS motor subscore is 6. Comments:  Follows one step commands with all extremities             Diagnostic Studies      EKG:   Imaging:  I have personally reviewed pertinent reports.    and I have personally reviewed pertinent films in PACS     Medications:  Scheduled PRN   chlorhexidine, 15 mL, Q12H [facility]  enoxaparin, 1 mg/kg, Q12H [facility]  FLUoxetine, 10 mg, Daily  omeprazole (PRILOSEC) suspension 2 mg/mL, 20 mg, Daily  piperacillin-tazobactam, 3.375 g, Q6H  polyethylene glycol, 17 g, Daily  senna, 17.6 mg, BID  sodium chloride, 4 mL, Q6H  thiamine, 500 mg, Q8H      acetaminophen, 650 mg, Q4H PRN  fentanyl citrate (PF), 50 mcg, Q1H PRN       Continuous    norepinephrine, 1-30 mcg/min, Last Rate: Stopped (08/26/23 0330)  propofol, 5-50 mcg/kg/min, Last Rate: Stopped (08/26/23 0900)         Labs:    CBC    Recent Labs     08/25/23  0600 08/26/23  0541   WBC 9.73 5.80   HGB 12.1 10.5*   HCT 36.4* 31.4*    206     BMP    Recent Labs     08/26/23  0541 08/26/23  2206   SODIUM 131* 133*   K 3.2* 3.7   CL 90* 93*   CO2 37* 36*   AGAP 4 4   BUN 7 9   CREATININE 0.88 0.90   CALCIUM 8.4 8.6       Coags    Recent Labs     08/26/23  0139 08/26/23  0817   PTT 59* 93*        Additional Electrolytes  Recent Labs     08/25/23  0600   MG 1.8*   CAIONIZED 1.08*          Blood Gas    Recent Labs     08/26/23  0816   PHART 7.424   OUV1KFT 53.7*   PO2ART 92.7   XJT4HVM 34.4*   BEART 8.5   SOURCE Line, Arterial     Recent Labs     08/26/23  0816   SOURCE Line, Arterial    LFTs  Recent Labs     08/25/23  0600 08/26/23  0541   ALT 74* 55*   AST 33 23   ALKPHOS 113* 90   ALB 3.3* 3.1*   TBILI 0.53 0.45       Infectious  Recent Labs     08/25/23  0600   PROCALCITONI 0.58*     Glucose  Recent Labs     08/25/23  0600 08/26/23  0541 08/26/23  2206   GLUC 107 104 94               Critical Care Time Delivered: Upon my evaluation, this patient had a high probability of imminent or life-threatening deterioration due to acute respiratory failure, which required my direct attention, intervention, and personal management. I have personally provided 30 minutes of critical care time, exclusive of procedures, teaching, family meetings, and any prior time recorded by providers other than myself.      Orlin Curran

## 2023-08-27 NOTE — ASSESSMENT & PLAN NOTE
· Sodium 124 on admission, now up to 128 s/p NSS infusion at 50mL/hr  · 8/23: serum osmo 267, urine osmo 335, urine sodium 40 (on demadex PTA)  · Hold NSS for now  · Sodium now 133  · Continue to closely trend BMP

## 2023-08-27 NOTE — ASSESSMENT & PLAN NOTE
Sepsis vs SIRs present on arrival to [facility] by leukocytosis, temp 96.4 with worsening hypoxia requiring intubation. · Thick, yellow/white secretions suctioned via ETT on arrival  · CXR with concern for left lower lobe infiltrate. Patient does have aspiration risk factors with encephalopathy  · Sputum culture, pending  · Strep pneumo/legionella urine antigens negative  · COVID/flu/RSV negative  · Blood cultures negative thus far  · UA unremarkable   · LA 1  · procal 0.37 > 0.58  · LP on previous admission negative, low utility for repeating LP at this time   · Patient hypotensive on arrival, but likely iatrogenic seconadary to sedation use. Levophed now off.  Will defer fluids given profound hypoxia on high ventilator settings, possible component of volume overload on CXR    Plan:  · Zosyn (vanco D/C'ed 8/26 after negative MRSA swab)  · Antibiotic day #4  · Follow culture data  · Trend WBC, fever curve and procal

## 2023-08-27 NOTE — ASSESSMENT & PLAN NOTE
Patient presented with hypoxia, found to have right lower lobe segmental PE. Initially saturating well on nasal cannula. Rapid response was called 8/24 secondary to hypoxia and altered mental status requiring emergent intubation. Hypoxic post intubation requiring high ventilator settings. · CTA PE study 8/23: Pulmonary embolism in the right lower lobe segmental pulmonary artery. Measured RV/LV ratio is within normal limits at less than 0.9.  · Initial CXR post intubation: Near complete opacity of the left lung likely related to worsening infiltrate and/or atelectasis. Patchy right upper lobe infiltrates. · Strep pneumo/legionella urine antigen negative  · COVID/flu/RSV negative  · 8/25 Bronchoscopy: poorly tolerated secondary to hypoxia. Some mucus plugging present on the left  · 8/26: CT chest Complete right lower lobe and near complete left lower lobe atelectasis. Pneumonia not excluded in the appropriate clinical setting. Mild interstitial edema with trace effusions.     Plan:  · ACVC 16x500, 80, 12  · Wean Fio2 for goal Sp02 >90  · Hypertonic saline nebs given thick secretions  · Chest PT  · Empiric antibiotics with zosyn (vanco D/C'ed with negative MRSA surveillance) for possible pneumonia  · Antibiotic day #4  · Consider repeat bronch today   · Pulmonary hygiene  · VAP ppx

## 2023-08-27 NOTE — UTILIZATION REVIEW
Continued Stay Review    Date: 8/27/23                          Current Patient Class: inpatient  Current Level of Care: ICU  HPI:38 y.o. male initially admitted on 8/24/23     Assessment/Plan:   HPI/24hr events: Vent weaned down to 80%/12PEEP. Now off sedation. CT scan showing complete right lower lobe and near complete left lower lobe atelectasis, pneumonia not excluded. Given 40mg IV lasix yesterday with good response, repeated today. Pt lethargic, able to follow 1 step commands with all extremities.     Vital Signs:   Date/Time Temp Pulse Resp BP MAP (mmHg) Arterial Line BP MAP SpO2 FiO2 (%) O2 Device Patient Position - Orthostatic VS   08/27/23 1316 -- -- -- -- -- -- -- 95 % -- -- --   08/27/23 1304 -- -- -- -- -- -- -- 97 % -- -- --   08/27/23 1200 99.7 °F (37.6 °C) 90 20 125/76 95 132/73 92 mmHg 100 % 40 Ventilator Lying   08/27/23 1157 -- -- -- -- -- -- -- 95 % -- -- --   08/27/23 1130 99.7 °F (37.6 °C) 90 29 Abnormal  -- -- 144/79 100 mmHg 94 % -- -- --   08/27/23 1100 99.1 °F (37.3 °C) 82 16 -- -- 113/65 82 mmHg 95 % -- -- --   08/27/23 1030 99.1 °F (37.3 °C) 84 17 -- -- 147/82 105 mmHg 96 % -- -- --   08/27/23 1000 98.6 °F (37 °C) 71 16 134/80 102 164/88 114 mmHg 97 % 40 -- --   08/27/23 0930 98.2 °F (36.8 °C) 79 15 -- -- 129/73 93 mmHg 99 % -- -- --   08/27/23 0900 98.6 °F (37 °C) 74 14 -- -- 124/69 89 mmHg 99 % -- -- --   08/27/23 0830 97.3 °F (36.3 °C) Abnormal  75 16 -- -- 115/66 84 mmHg 99 % -- -- --   08/27/23 0800 98.6 °F (37 °C) 82 14 121/76 94 121/70 86 mmHg 99 % 60 Ventilator Lying     Arterial Line  Bambi /74  Arterial Line BP  Min: 97/56  Max: 135/74   MAP 94 mmHg  Arterial Line MAP (mmHg)  Min: 71 mmHg  Max: 95 mmHg       Diet Enteral/Parenteral; Tube Feeding No Oral Diet; Jevity 1.2 Scott; Continuous; 70; Prosource Protein Liquid - One Packet Daily; 140; Water; Every 4 hours    Intake/Output Summary (Last 24 hours) at 8/27/2023 0054  Last data filed at 8/27/2023 0001      Gross per 24 hour   Intake 4108.79 ml   Output 2580 ml   Net 1528.79 ml       Pertinent Labs/Diagnostic Results:   8/26 CT chest wo contrast  Complete right lower lobe and near complete left lower lobe atelectasis. Pneumonia not excluded in the appropriate clinical setting. Mild interstitial edema with trace effusions. Hepatic steatosis. Results from last 7 days   Lab Units 08/24/23  2138   SARS-COV-2  Negative     Results from last 7 days   Lab Units 08/27/23  0546 08/26/23  0541 08/25/23  0600 08/24/23 2024 08/23/23  1435   WBC Thousand/uL 6.04 5.80 9.73 15.38* 10.11   HEMOGLOBIN g/dL 10.3* 10.5* 12.1 13.0 14.3   HEMATOCRIT % 32.2* 31.4* 36.4* 38.5 41.6   PLATELETS Thousands/uL 211 206 249 301 358   NEUTROS ABS Thousands/µL 4.25  --  7.97*  --  7.95*     Results from last 7 days   Lab Units 08/27/23  0546 08/26/23 2206 08/26/23  0541 08/25/23  0600 08/24/23 2024 08/24/23  0553 08/23/23  2312   SODIUM mmol/L 133* 133* 131* 131* 129*   < > 126*  126*   POTASSIUM mmol/L 3.7 3.7 3.2* 3.8 3.5   < > 3.5  3.5   CHLORIDE mmol/L 94* 93* 90* 88* 86*   < > 80*  81*   CO2 mmol/L 35* 36* 37* 38* 35*   < > 41*  38*   ANION GAP mmol/L 4 4 4 5 8   < > 5  7   BUN mg/dL 8 9 7 8 8   < > 8  8   CREATININE mg/dL 0.80 0.90 0.88 0.66 0.59*   < > 0.80  0.75   EGFR ml/min/1.73sq m 114 109 110 124 130   < > 114  118   CALCIUM mg/dL 8.6 8.6 8.4 8.8 8.9   < > 8.1*  8.2*   CALCIUM, IONIZED mmol/L  --   --   --  1.08*  --   --   --    MAGNESIUM mg/dL 2.0  --   --  1.8*  --   --  2.3   PHOSPHORUS mg/dL 3.7  --   --   --   --   --   --     < > = values in this interval not displayed.      Results from last 7 days   Lab Units 08/26/23  0541 08/25/23  0600 08/23/23  1435   AST U/L 23 33 29   ALT U/L 55* 74* 73*   ALK PHOS U/L 90 113* 123*   TOTAL PROTEIN g/dL 5.5* 6.0* 7.5   ALBUMIN g/dL 3.1* 3.3* 4.2   TOTAL BILIRUBIN mg/dL 0.45 0.53 0.52     Results from last 7 days   Lab Units 08/24/23  1357   POC GLUCOSE mg/dl 208*     Results from last 7 days   Lab Units 08/27/23  0546 08/26/23  2206 08/26/23  0541 08/25/23  0600 08/24/23  2024 08/24/23  0834 08/24/23  0553 08/23/23  2312 08/23/23  1435   GLUCOSE RANDOM mg/dL 102 94 104 107 112 97 102 111  109 130     Results from last 7 days   Lab Units 08/23/23  2312   OSMOLALITY, SERUM mmol/*     Results from last 7 days   Lab Units 08/27/23  0546 08/26/23  0816 08/26/23  0434   PH ART  7.415 7.424 7.404   PCO2 ART mm Hg 48.4* 53.7* 57.4*   PO2 ART mm Hg 114.6 92.7 107.6   HCO3 ART mmol/L 30.3* 34.4* 35.1*   BASE EXC ART mmol/L 5.0 8.5 8.7   O2 CONTENT ART mL/dL 15.2* 15.4* 15.5*   O2 HGB, ARTERIAL % 97.7* 96.3 97.3*   ABG SOURCE  Line, Arterial Line, Arterial Line, Arterial     Results from last 7 days   Lab Units 08/24/23  1534   PH SYLVESTER  7.255*   PCO2 SYLVESTER mm Hg 76.1*   PO2 SYLVESTER mm Hg 59.8*   HCO3 SYLVESTER mmol/L 33.0*   BASE EXC SYLVESTER mmol/L 3.4   O2 CONTENT SYLVESTER ml/dL 17.0   O2 HGB, VENOUS % 87.0*     Results from last 7 days   Lab Units 08/23/23  1435   D-DIMER QUANTITATIVE ug/ml FEU 1.11*     Results from last 7 days   Lab Units 08/26/23  0817 08/26/23  0139 08/25/23 2007 08/24/23  0038 08/23/23  1435   PROTIME seconds  --   --   --   --  13.1   INR   --   --   --   --  0.96   PTT seconds 93* 59* 67*   < > 28    < > = values in this interval not displayed.      Results from last 7 days   Lab Units 08/25/23  0600   TSH 3RD GENERATON uIU/mL 2.563     Results from last 7 days   Lab Units 08/25/23  0600 08/24/23  2024   PROCALCITONIN ng/ml 0.58* 0.37*     Results from last 7 days   Lab Units 08/24/23 2024 08/23/23  1444   LACTIC ACID mmol/L 1.0 1.9     Results from last 7 days   Lab Units 08/23/23  1435   BNP pg/mL 9     Results from last 7 days   Lab Units 08/23/23 2329 08/23/23  2312   OSMOLALITY, SERUM mmol/KG  --  267*   OSMO UR mmol/  --      Results from last 7 days   Lab Units 08/24/23 2012 08/23/23 2329   CLARITY UA  Clear  --    COLOR UA  Yellow  --    SPEC GRAV UA  >=1.030  --    PH UA  6.0  -- GLUCOSE UA mg/dl Negative  --    KETONES UA mg/dl Negative  --    BLOOD UA  Negative  --    PROTEIN UA mg/dl 30 (1+)*  --    NITRITE UA  Negative  --    BILIRUBIN UA  Negative  --    UROBILINOGEN UA E.U./dl 1.0  --    LEUKOCYTES UA  Negative  --    WBC UA /hpf 10-20*  --    RBC UA /hpf 0-1  --    BACTERIA UA /hpf Occasional  --    EPITHELIAL CELLS WET PREP /hpf Occasional  --    MUCUS THREADS  Moderate*  --    SODIUM UR   --  40     Results from last 7 days   Lab Units 08/24/23  2138 08/24/23 2013   STREP PNEUMONIAE ANTIGEN, URINE   --  Negative   LEGIONELLA URINARY ANTIGEN   --  Negative   INFLUENZA A PCR  Negative  --    INFLUENZA B PCR  Negative  --    RSV PCR  Negative  --      Results from last 7 days   Lab Units 08/24/23 2110 08/24/23 2029 08/24/23 2025 08/24/23 2012 08/23/23  1444 08/23/23  1435   BLOOD CULTURE   --  No Growth at 48 hrs. No Growth at 48 hrs. --  No Growth at 72 hrs. No Growth at 72 hrs. SPUTUM CULTURE  Culture results to follow.   1+ Growth of  --   --   --   --   --    GRAM STAIN RESULT  1+ Epithelial cells per low power field*  3+ Polys*  1+ Gram positive cocci in pairs*  --   --   --   --   --    URINE CULTURE   --   --   --  No Growth <1000 cfu/mL  --   --      Results from last 7 days   Lab Units 08/25/23  1627   TOTAL COUNTED  100     Results from last 7 days   Lab Units 08/26/23  0541   VANCOMYCIN TR ug/mL 11.1       Medications:   Scheduled Medications:  chlorhexidine, 15 mL, Mouth/Throat, Q12H ISAEL  enoxaparin, 1 mg/kg, Subcutaneous, Q12H ISAEL  FLUoxetine, 10 mg, Oral, Daily  furosemide (LASIX) injection 40 mg, Once 8/27  omeprazole (PRILOSEC) suspension 2 mg/mL, 20 mg, Oral, Daily  piperacillin-tazobactam, 3.375 g, Intravenous, Q6H  polyethylene glycol, 17 g, Oral, Daily  senna, 17.6 mg, Oral, BID  sodium chloride, 4 mL, Nebulization, Q6H  thiamine (VITAMIN B1) 500 mg in sodium chloride 0.9 % 50 mL IVPB, Q8H    PRN Meds:  acetaminophen, 650 mg, Oral, Q4H PRN  fentanyl citrate (PF), 50 mcg, Intravenous, Q1H PRN, 8/27 x1    Discharge Plan: tbd    Network Utilization Review Department  ATTENTION: Please call with any questions or concerns to [phone] and carefully listen to the prompts so that you are directed to the right person. All voicemails are confidential.  [facility] [place] all requests for admission clinical reviews, approved or denied determinations and any other requests to dedicated fax number below belonging to the campus where the patient is receiving treatment.  List of dedicated fax numbers for the Facilities:  [place] DENIALS (Administrative/Medical Necessity) [phone] [address] [facility] (Maternity/NICU/Pediatrics) [phone]   [address] [place] [address] [phone]   [facility] [address] [facility] [phone]   [address] [place] [address] [address] [facility] [address] [place] [address] [phone]   [address] [address] [phone]

## 2023-08-27 NOTE — ASSESSMENT & PLAN NOTE
Presented with hypoxia, initially admitted to the floor on supplemental NC  · CTA PE: Pulmonary embolism in the right lower lobe segmental pulmonary artery. Measured RV/LV ratio is within normal limits at less than 0.9. · BNP 9, initial HS troponin 9  · Initiated on heparin gtt  · Developed worsening hypoxia 8/24, rapid response was called and patient was intubated for airway protection and hypoxic  · Now requiring high vent setting  · Echo completed 8/25: Left Ventricle: Left ventricular cavity size is normal. Wall thickness is normal. The left ventricular ejection fraction is 60% by visual estimation. . Wall motion is normal. Diastolic function is normal for age. Right Ventricle: Right ventricular cavity size is normal. Systolic function is normal. Normal tricuspid annular plane systolic excursion (TAPSE) > 1.7 cm.     Plan:  · Heparin converted to therapeutic lovenox on 8/26

## 2023-08-27 NOTE — ASSESSMENT & PLAN NOTE
[facility] presented to [facility] on [facility] with hypoxia and encephalopathy. Today, had acute change in GCS prompting rapid response and subsequent intubation for airway protection. Of note, recent hospitalization 8/11-17 for encephalopathy with unclear etiology despite extensive workup but Wernicke's encephalopathy on the differential  · [facility] hospitalization notable for rapid response and intubation on 8/12 for airway protection with extubation on 8/13. Treated with high dose thiamine. Evaluated by psychiatry for depressed mood, possible bipolar disorder. Discharged to home with his sister with visiting nurses, speech therapy, PT/OT  · [place] workup  · CT head 8/11: No acute intracranial abnormality. Stable small bilateral subcortical hypoattenuating foci. No associated mass effect. · MRI 8/12: No acute intracranial abnormality. Stable nonspecific enhancement along the anterior floor of the third ventricle/hypothalamus when comparing to the March 15, 2023 study. Differential includes chronic toxic metabolic insult (thiamine deficiency), chronic inflammatory/ infectious conditions. Other considerations include Langerhans cell cell histiocytosis, lymphocytic hypophysitis, and neurosyphilis. Primary CNS lymphoma is a less likely consideration, particularly given the lack of interval change. Serologic correlation is recommended. Stable cerebral white matter signal abnormality, presumably the sequela of chronic microangiopathic disease given the patient's history  · VEEG: negative for seizure activity   · LP: meningitis/encephalitis panel negative, culture negative, lyme and cryptococcal negative, paraneoplastic and autoimmune CSF studies still pending (sent to [facility])  · 8/24: patient's girlfriend visited [facility] and found him unresponsive. Notes said that 'eyes were rolled back with secretions around his mouth". Rapid response was called and he was intubated for hypoxia and low GCS. VBG ?  Post intubation 7. 25/76/59/33  · CT head 8/24: No acute intracranial abnormality  · 8/25 EEG: This Routine EEG recorded during wakefulness, drowsiness, and sleep is abnormal due to presence of mild background slowing suggestive of underlying cerebral dysfunction from toxic/metablic/infectious/hypoxic encephalopathy. There is no evidence of electrographic seizures. Clinical correlation is recommended  · Neuro exam: wakes to voice, weakly follows one step commands with all extremities     Plan:  · Encephalopathy likely multifactorial in the setting of abnormal (however stable) MRI showing possible wernicke's encephalopathy, hypercarbia, hyponatremia  · Recommend BiPAP vs CPAP HS while inpatient once extubated.  Patient would benefit from outpatient PFTs and possible CPAP HS  · Continue thiamine dose to 500mg Q8   · Follow up previous LP send out studies, low utility for repeat LP  · Neurology consult, appreciate recommendations  · Limit sedating meds, currently on no sedation   · CAM ICU  · Sleep hygiene  · Neurochecks per routine

## 2023-08-28 ENCOUNTER — APPOINTMENT (INPATIENT)
Dept: RADIOLOGY | Facility: HOSPITAL | Age: 38
DRG: 005 | End: 2023-08-28
Payer: COMMERCIAL

## 2023-08-28 PROBLEM — E87.1 HYPONATREMIA: Status: RESOLVED | Noted: 2023-07-28 | Resolved: 2023-08-28

## 2023-08-28 LAB
ANION GAP SERPL CALCULATED.3IONS-SCNC: 8 MMOL/L
BACTERIA BLD CULT: NORMAL
BACTERIA BLD CULT: NORMAL
BACTERIA SPT RESP CULT: ABNORMAL
BACTERIA SPT RESP CULT: ABNORMAL
BUN SERPL-MCNC: 8 MG/DL (ref 5–25)
CALCIUM SERPL-MCNC: 9.1 MG/DL (ref 8.4–10.2)
CHLORIDE SERPL-SCNC: 96 MMOL/L (ref 96–108)
CO2 SERPL-SCNC: 33 MMOL/L (ref 21–32)
CREAT SERPL-MCNC: 0.79 MG/DL (ref 0.6–1.3)
FUNGUS SPEC CULT: NORMAL
GFR SERPL CREATININE-BSD FRML MDRD: 115 ML/MIN/1.73SQ M
GLUCOSE SERPL-MCNC: 109 MG/DL (ref 65–140)
GRAM STN SPEC: ABNORMAL
MAGNESIUM SERPL-MCNC: 2.1 MG/DL (ref 1.9–2.7)
PHOSPHATE SERPL-MCNC: 3.6 MG/DL (ref 2.7–4.5)
POTASSIUM SERPL-SCNC: 3.9 MMOL/L (ref 3.5–5.3)
SODIUM SERPL-SCNC: 137 MMOL/L (ref 135–147)

## 2023-08-28 PROCEDURE — 83735 ASSAY OF MAGNESIUM: CPT | Performed by: NURSE PRACTITIONER

## 2023-08-28 PROCEDURE — 94640 AIRWAY INHALATION TREATMENT: CPT

## 2023-08-28 PROCEDURE — 84100 ASSAY OF PHOSPHORUS: CPT | Performed by: NURSE PRACTITIONER

## 2023-08-28 PROCEDURE — 99291 CRITICAL CARE FIRST HOUR: CPT | Performed by: INTERNAL MEDICINE

## 2023-08-28 PROCEDURE — 94668 MNPJ CHEST WALL SBSQ: CPT

## 2023-08-28 PROCEDURE — 94760 N-INVAS EAR/PLS OXIMETRY 1: CPT

## 2023-08-28 PROCEDURE — 80048 BASIC METABOLIC PNL TOTAL CA: CPT | Performed by: NURSE PRACTITIONER

## 2023-08-28 PROCEDURE — 93970 EXTREMITY STUDY: CPT | Performed by: SURGERY

## 2023-08-28 PROCEDURE — 99291 CRITICAL CARE FIRST HOUR: CPT | Performed by: PSYCHIATRY & NEUROLOGY

## 2023-08-28 PROCEDURE — 93970 EXTREMITY STUDY: CPT

## 2023-08-28 PROCEDURE — NC001 PR NO CHARGE: Performed by: PHYSICIAN ASSISTANT

## 2023-08-28 PROCEDURE — 94669 MECHANICAL CHEST WALL OSCILL: CPT

## 2023-08-28 PROCEDURE — 94003 VENT MGMT INPAT SUBQ DAY: CPT

## 2023-08-28 RX ORDER — POLYETHYLENE GLYCOL 3350 17 G/17G
17 POWDER, FOR SOLUTION ORAL DAILY PRN
Status: DISCONTINUED | OUTPATIENT
Start: 2023-08-29 | End: 2023-09-23 | Stop reason: HOSPADM

## 2023-08-28 RX ORDER — SENNOSIDES 8.8 MG/5ML
17.6 LIQUID ORAL DAILY PRN
Status: DISCONTINUED | OUTPATIENT
Start: 2023-08-29 | End: 2023-09-23 | Stop reason: HOSPADM

## 2023-08-28 RX ORDER — FUROSEMIDE 10 MG/ML
40 INJECTION INTRAMUSCULAR; INTRAVENOUS ONCE
Status: COMPLETED | OUTPATIENT
Start: 2023-08-28 | End: 2023-08-28

## 2023-08-28 RX ADMIN — THIAMINE HCL TAB 100 MG 100 MG: 100 TAB at 08:33

## 2023-08-28 RX ADMIN — PIPERACILLIN AND TAZOBACTAM 3.38 G: 36; 4.5 INJECTION, POWDER, FOR SOLUTION INTRAVENOUS at 04:29

## 2023-08-28 RX ADMIN — FLUOXETINE 10 MG: 10 CAPSULE ORAL at 08:35

## 2023-08-28 RX ADMIN — FUROSEMIDE 40 MG: 10 INJECTION, SOLUTION INTRAMUSCULAR; INTRAVENOUS at 09:48

## 2023-08-28 RX ADMIN — CHLORHEXIDINE GLUCONATE 15 ML: 1.2 RINSE ORAL at 20:11

## 2023-08-28 RX ADMIN — POLYETHYLENE GLYCOL 3350 17 G: 17 POWDER, FOR SOLUTION ORAL at 08:33

## 2023-08-28 RX ADMIN — FENTANYL CITRATE 50 MCG: 50 INJECTION, SOLUTION INTRAMUSCULAR; INTRAVENOUS at 02:03

## 2023-08-28 RX ADMIN — ENOXAPARIN SODIUM 135 MG: 150 INJECTION SUBCUTANEOUS at 08:36

## 2023-08-28 RX ADMIN — SENNOSIDES 17.6 MG: 8.8 SYRUP ORAL at 08:32

## 2023-08-28 RX ADMIN — PIPERACILLIN AND TAZOBACTAM 3.38 G: 36; 4.5 INJECTION, POWDER, FOR SOLUTION INTRAVENOUS at 15:41

## 2023-08-28 RX ADMIN — PIPERACILLIN AND TAZOBACTAM 3.38 G: 36; 4.5 INJECTION, POWDER, FOR SOLUTION INTRAVENOUS at 23:30

## 2023-08-28 RX ADMIN — Medication 20 MG: at 08:37

## 2023-08-28 RX ADMIN — CHLORHEXIDINE GLUCONATE 15 ML: 1.2 RINSE ORAL at 08:33

## 2023-08-28 RX ADMIN — SODIUM CHLORIDE SOLN NEBU 3% 4 ML: 3 NEBU SOLN at 19:57

## 2023-08-28 RX ADMIN — PIPERACILLIN AND TAZOBACTAM 3.38 G: 36; 4.5 INJECTION, POWDER, FOR SOLUTION INTRAVENOUS at 09:47

## 2023-08-28 RX ADMIN — ENOXAPARIN SODIUM 135 MG: 150 INJECTION SUBCUTANEOUS at 20:11

## 2023-08-28 RX ADMIN — SODIUM CHLORIDE SOLN NEBU 3% 4 ML: 3 NEBU SOLN at 02:00

## 2023-08-28 RX ADMIN — SODIUM CHLORIDE SOLN NEBU 3% 4 ML: 3 NEBU SOLN at 07:53

## 2023-08-28 RX ADMIN — SODIUM CHLORIDE SOLN NEBU 3% 4 ML: 3 NEBU SOLN at 13:48

## 2023-08-28 NOTE — ASSESSMENT & PLAN NOTE
Previous echocardiogram 6/2/23 showed EF 10%, normal diastolic dysfunction, mild left ventricular hypertrophy, mild RVSP elevation and mild TR  · PTA Med: Torsemide 20 mg daily  · Evaluated by Heart Failure team in July 2023.  LVH thought to be secondary to hypertension, obesity, hx of meth use  · Recommended sleep study at that time   · Echo this admission shows normal EF of 60%, normal LV thickness, no diastolic dysfunction

## 2023-08-28 NOTE — ASSESSMENT & PLAN NOTE
Improving  · Sodium 124 on admission, improved to 128 s/p NSS infusion at 50mL/hr  · 8/23: serum osmo 267, urine osmo 335, urine sodium 40 (on Torsemide PTA)  · Hold NSS for now  · 8/27: Sodium improving to 133   · Continue to closely trend BMP

## 2023-08-28 NOTE — ASSESSMENT & PLAN NOTE
- Goal normotension.   - Management as per critical care team. Nurse bladder scanned patient this shift due to patient did not void this shift after genao cath removal. Bladder scanned patient at 1200 today and results were 46ml. Bladder scanned patient again at 1800 and results were 81.

## 2023-08-28 NOTE — WOUND OSTOMY CARE
Progress Note - Wound   Bin Akins 41 y.o. male MRN: [id number]  Unit/Bed#: ICU 03 Encounter: [id number]      Assessment: This is a 41year old male patient admitted on 8/24/23 in acute respiratory failure d/t right lower lobe pulmonary embolus. He has a history of HTN, left ventricular hypertrophy, testicular cancer s/p L radical orchiectomy 12/2022, obesity and umbilical hernia. He was received in ICU bed ventilated via oral endotracheal tube. He is totally dependent upon nursing staff for all of his care at this time. He has condom catheter to gravity and was incontinent of large amount of liquid stool during wound visit. Primary RN assisted in giving incontinence care. Assessment Findings:  1-Stage 3 pressure injury to right sacrobuttock (was unstageable on prior visit) with pink granulation tissue and yellow slough. Orders in place for wound care and for prevention. 2-Full thickness wound to left anterior knee with pink granulation tissue and yellow slough - orders in place for wound care. 3-Serum filled blister to left dorsal hand and left 2nd finger with no drainage - orders in place for wound care. Plan:   Skin care plans:  1-Cleanse wound to right sacrobuttock with foaming cleanser & pat dry. Apply Triad paste to entire sacrobuttock area in a thin layer 2x/day & prn soilage/dislodgement. 2-Cleanse blisters to left dorsal hand and left 2nd finger with NSS & pat dry. Open small Dermagran square & apply to blisters in a single layer cut to size. Cover with gauze, michele & tape. Change every other day & prn soilage/dislodgement. 3-Hydraguard to bilateral heels BID and PRN  4-Float heels on 2 pillows to offload pressure so heels are not in contact with mattress or pillows. 5-Ehob pressure redistribution cushion in chair when out of bed. Limit sitting for 1-2 hours at a time due to sacrobuttock breakdown then offload back in bed turning side to side every 2 hours.   6-Moisturize skin daily with skin nourishing cream.  7-Turn/reposition q2h or when medically stable for pressure re-distribution on skin. Serum filled blister to left dorsal hand     Serum filled blister to left 2nd finger        Wound 08/11/23 Pressure Injury Buttocks Right (Active)   Wound Image   08/28/23 1016   Wound Description Pink;Granulation tissue;Slough; Yellow 08/28/23 1016   Pressure Injury Stage Stage 3 08/25/23 1016   Donna-wound Assessment Intact; Hyperpigmented;Scar Tissue 08/28/23 1016   Wound Length (cm) 4 cm 08/28/23 1016   Wound Width (cm) 4 cm 08/28/23 1016   Wound Depth (cm) 0.1 cm 08/28/23 1016   Wound Surface Area (cm^2) 16 cm^2 08/28/23 1016   Wound Volume (cm^3) 1.6 cm^3 08/28/23 1016   Calculated Wound Volume (cm^3) 1.6 cm^3 08/28/23 1016   Change in Wound Size % 33.33 08/28/23 1016   Drainage Amount None 08/28/23 1016   Drainage Description None 08/28/23 1016   Treatments Cleansed 08/28/23 1016   Dressing Protective barrier 08/28/23 1016   Dressing Changed Changed 08/28/23 1016   Dressing Status Clean;Dry; Intact 08/28/23 1016       Wound 08/28/23 Knee Anterior; Left (Active)   Wound Image   08/28/23 1016   Wound Description Granulation tissue;Pink;Slough; Yellow 08/28/23 1016   Donna-wound Assessment Intact; Hyperpigmented 08/28/23 1016   Wound Length (cm) 0.6 cm 08/28/23 1016   Wound Width (cm) 0.5 cm 08/28/23 1016   Wound Depth (cm) 0.1 cm 08/28/23 1016   Wound Surface Area (cm^2) 0.3 cm^2 08/28/23 1016   Wound Volume (cm^3) 0.03 cm^3 08/28/23 1016   Calculated Wound Volume (cm^3) 0.03 cm^3 08/28/23 1016   Drainage Amount Scant 08/28/23 1016   Drainage Description Serous;Clear 08/28/23 1016   Treatments Cleansed 08/28/23 1016   Dressing Dermagran gauze; Foam, Silicon (eg. Allevyn, etc) 08/28/23 1016   Wound packed? No 08/28/23 1016   Dressing Changed Changed 08/28/23 1016   Dressing Status Clean;Dry; Intact 08/28/23 1016     Discussed assessment findings, and plan of care/recommendations with Starr JEONG.     Wound care will follow along with patient throughout admission, please call or tiger text with questions and concerns. Recommendations written as orders.   Yan Cornejo MSN, RN, [facility]

## 2023-08-28 NOTE — ASSESSMENT & PLAN NOTE
Patient initially presented to [facility] on 8/23 with hypoxia and encephalopathy. Acute change in GCS prompted rapid response and subsequent intubation for airway protection. Of note, pt w/ recent hospitalization to [facility] 8/11-8/17 for encephalopathy with unclear etiology despite extensive workup but Wernicke's encephalopathy was on the differential.    ·  [place] workup 8/11:  · Rapid response and intubation on 8/12 for airway protection, extubated on 8/13  · CT head 8/11: No acute intracranial abnormality. Stable small bilateral subcortical hypoattenuating foci. No associated mass effect. · MRI 8/12: No acute intracranial abnormality. Stable nonspecific enhancement along the anterior floor of the third ventricle/hypothalamus compared to March 2023 study. Findings below:  · Differentials includes chronic toxic metabolic insult (thiamine deficiency), chronic inflammatory/infectious conditions  · Other considerations include Langerhans cell cell histiocytosis, lymphocytic hypophysitis, and neurosyphilis. · Primary CNS lymphoma less likely consideration, given the lack of interval change. Serologic correlation is recommended. Stable cerebral white matter signal abnormality, presumably the sequela of chronic microangiopathic disease given the patient's history  · VEEG 8/12: negative for seizure activity. Possible findings relating to underlying sleep disorder   · LP: Grossly negative overall, pending some results  · Meningitis/encephalitis panel negative, bacterial/fungal culture and gram negative, lyme and cryptococcal negative  · Pending studies: Paraneoplastic and autoimmune CSF studies still pending, can take up to 2 weeks ([facility])  · DSPO: Treated with high dose thiamine. Psych evaluated, possible bipolar disorder.  Discharged to home with his sister with visiting nurses, speech therapy, PT/OT  ·  [place] ([place]) 8/23: Presented with hypoxia found by visiting nurse SPO2 in 80's  · 8/24: Patient's girlfriend visited [facility] and found him unresponsive. Notes said that 'eyes were rolled back with secretions around his mouth"  · Rapid response called and intubated for hypoxia and low GCS. VBG ? Post intubation 7.25/76/59/33  · [facility] 8[facility]  · [facility] 8/24 (Current hospital course)  · CT head 8/24: No acute intracranial abnormality  · 8/25 EEG: No evidence of electrographic seizures. Mild background slowing suggestive of underlying cerebral dysfunction from toxic/metablic/infectious/hypoxic encephalopathy. Clinical correlation is recommended  · On neurological exam: wakes to voice, weakly follows one step commands with all extremities     Plan:  · Encephalopathy likely multifactorial in the setting of abnormal (however stable) MRI showing possible wernicke's encephalopathy, hypercarbia, hyponatremia  · Recommend BiPAP vs CPAP HS while inpatient once extubated. Patient would benefit from outpatient PFTs and possible CPAP HS  · Continue thiamine  · Follow up previous LP send out studies, low utility for repeat LP  · Neurology consulted, recommendations as follows:  · Patient has been somnolent for some time, noted to have some neuro symptoms after chemo in march. · Parneoplastic panel pending, can take up to 2 weeks.  Added CSF VDRL and serum, anti-TPO and DARIN Ab's  · Supportive treatment, wait on paraneoplastic panel before considering IVIG/steroids as suspicion is not very high  · Limit sedating meds, currently off sedation   · CAM ICU  · Sleep hygiene  · Neurochecks per routine

## 2023-08-28 NOTE — QUICK NOTE
Family members at bedside. Sister Genna Colvin, significant other Krystyna Siu, and Aunt present. All were updated on patient's current status and plan. All questions were answered.

## 2023-08-28 NOTE — DISCHARGE INSTR - OTHER ORDERS
Plan:   Skin care plans:    1-Cleanse sacrobuttocks with foaming cleanser & pat dry. Apply Triad paste to entire sacrobuttock area in a thin layer 2x/day & as needed for soilage/dislodgement. 2-Cleanse blisters to left hand and left 2nd finger with wound cleanser & pat dry. Open small Dermagran square & apply to blisters in a single layer cut to size. Cover with gauze, michele & tape. Change every other day & as needed for soilage/dislodgement. 3-Hydraguard barrier cream or equivalent to bilateral heels 2x/day and as needed. 4-Float heels on 2 pillows to offload pressure so heels are not in contact with mattress or pillows. 5-Use pressure redistribution cushion in chair when out of bed. Limit sitting for 1-2 hours at a time due to sacrobuttock breakdown then offload back in bed turning side to side every 2 hours. 6-Moisturize skin daily with skin nourishing cream.  7-Turn/reposition every 2 hrs for pressure re-distribution on skin. 8-Follow up at [address] [place] wounds not healed on discharge. Call Central Scheduling for appointment: [phone].

## 2023-08-28 NOTE — ASSESSMENT & PLAN NOTE
Presented with hypoxia, initially admitted to the floor on supplemental NC  · CTA PE: Pulmonary embolism in the right lower lobe segmental pulmonary artery. Measured RV/LV ratio is within normal limits at less than 0.9. · BNP 9, initial HS troponin 9  · Initiated on heparin gtt, transitioned to therapeutic Lovenox on 8/26  · Developed worsening hypoxia 8/24, rapid response was called and patient was intubated for airway protection and hypoxic  · Now requiring high vent setting  · Echo completed 8/25: Left Ventricle: Left ventricular cavity size is normal. Wall thickness is normal. The left ventricular ejection fraction is 60% by visual estimation. . Wall motion is normal. Diastolic function is normal for age. Right Ventricle: Right ventricular cavity size is normal. Systolic function is normal. Normal tricuspid annular plane systolic excursion (TAPSE) > 1.7 cm.     Plan:  · Heparin converted to therapeutic lovenox on 8/26  · Will check venous duplex LE b/l  · Will need transition to [address] on DC for 3 months

## 2023-08-28 NOTE — ASSESSMENT & PLAN NOTE
Improving  · Sodium 124 on admission, improved to 128 s/p NSS infusion at 50mL/hr  · 8/23: serum osmo 267, urine osmo 335, urine sodium 40 (on Torsemide PTA)  · Off of NSS  · 8/28: Na 137.  Resolved

## 2023-08-28 NOTE — PROGRESS NOTES
[address] [facility]  Progress Note  Name: Vance Watkins  MRN: [id number]  Unit/Bed#: ICU 03 I Date of Admission: 8/24/2023   Date of Service: 8/28/2023 I Hospital Day: 4    Assessment/Plan   Acute respiratory failure with hypoxia [facility])  Assessment & Plan  Patient presented with hypoxia, found to have right lower lobe segmental PE. Initially saturating well on nasal cannula. Rapid response was called 8/24 secondary to hypoxia and altered mental status requiring emergent intubation. Hypoxic post intubation requiring high ventilator settings. · CTA PE study 8/23: Pulmonary embolism in the right lower lobe segmental pulmonary artery. Measured RV/LV ratio is within normal limits at less than 0.9.  · Initial CXR post intubation: Near complete opacity of the left lung likely related to worsening infiltrate and/or atelectasis. Patchy right upper lobe infiltrates. · Strep pneumo/legionella urine antigen negative  · COVID/Flu/RSV negative  · 8/25: Bronchoscopy: poorly tolerated secondary to hypoxia. Some mucus plugging present on the left. · 8/26: CT chest: Complete right lower lobe and near complete left lower lobe atelectasis. Pneumonia not excluded in the appropriate clinical setting. Mild interstitial edema with trace effusions  · Net I/O +2.2L. LE edema on exam. Will diurese additional IV Lasix 40 mg today, rpt BMP  · 8/27 ABG 7.41/48.4/113.4/30.3    Plan:  · 8/28 Current vent settings:  · ACVC 14/500/40/6  · Wean Fio2 for goal Sp02 >90  · Empiric antibiotics with Zosyn (Vanco D/C'ed with negative MRSA surveillance) for possible pneumonia  · Antibiotic: Zosyn Day 4/7 (Started 8/25)  · Hypertonic saline nebs given thick secretions  · Chest PT, Pulmonary hygiene  · VAP ppx    Encephalopathy  Assessment & Plan  Patient initially presented to [facility] on 8/23 with hypoxia and encephalopathy. Acute change in GCS prompted rapid response and subsequent intubation for airway protection.  Of note, pt w/ recent hospitalization to [facility] 8/11-8/17 for encephalopathy with unclear etiology despite extensive workup but Wernicke's encephalopathy was on the differential.    ·  [place] workup 8/11:  · Rapid response and intubation on 8/12 for airway protection, extubated on 8/13  · CT head 8/11: No acute intracranial abnormality. Stable small bilateral subcortical hypoattenuating foci. No associated mass effect. · MRI 8/12: No acute intracranial abnormality. Stable nonspecific enhancement along the anterior floor of the third ventricle/hypothalamus compared to March 2023 study. Findings below:  · Differentials includes chronic toxic metabolic insult (thiamine deficiency), chronic inflammatory/infectious conditions  · Other considerations include Langerhans cell cell histiocytosis, lymphocytic hypophysitis, and neurosyphilis. · Primary CNS lymphoma less likely consideration, given the lack of interval change. Serologic correlation is recommended. Stable cerebral white matter signal abnormality, presumably the sequela of chronic microangiopathic disease given the patient's history  · VEEG 8/12: negative for seizure activity. Possible findings relating to underlying sleep disorder   · LP: Grossly negative overall, pending some results  · Meningitis/encephalitis panel negative, bacterial/fungal culture and gram negative, lyme and cryptococcal negative  · Pending studies: Paraneoplastic and autoimmune CSF studies still pending, can take up to 2 weeks (sent to [facility])  · DSPO: Treated with high dose thiamine. Psych evaluated, possible bipolar disorder. Discharged to home with his sister with visiting nurses, speech therapy, PT/OT  ·  [place]ie [place] 8/23: Presented with hypoxia found by visiting nurse SPO2 in 80's  · 8/24: Patient's girlfriend visited [address] [place] [address] Luz [place] and found him unresponsive. Notes said that 'eyes were rolled back with secretions around his mouth"  · Rapid response called and intubated for hypoxia and low GCS.  VBG ? Post intubation 7.25/76/59/33  · Transferred to [facility] on 8/24  ·  Ruddy Adelfo 8/24 (Current hospital course)  · CT head 8/24: No acute intracranial abnormality  · 8/25 EEG: No evidence of electrographic seizures. Mild background slowing suggestive of underlying cerebral dysfunction from toxic/metablic/infectious/hypoxic encephalopathy. Clinical correlation is recommended  · On neurological exam: wakes to voice, weakly follows one step commands with all extremities     Plan:  · Encephalopathy likely multifactorial in the setting of abnormal (however stable) MRI showing possible wernicke's encephalopathy, hypercarbia, hyponatremia  · Recommend BiPAP vs CPAP HS while inpatient once extubated. Patient would benefit from outpatient PFTs and possible CPAP HS  · Continue thiamine  · Follow up previous LP send out studies, low utility for repeat LP  · Neurology consulted, recommendations as follows:  · Patient has been somnolent for some time, noted to have some neuro symptoms after chemo in march. · Parneoplastic panel pending, can take up to 2 weeks. Added CSF VDRL and serum, anti-TPO and DARIN Ab's  · Supportive treatment, wait on paraneoplastic panel before considering IVIG/steroids as suspicion is not very high  · Limit sedating meds, currently off sedation   · CAM ICU  · Sleep hygiene  · Neurochecks per routine     Sepsis [facility])  Assessment & Plan  Improving  Sepsis vs SIRS present on arrival to [facility] by leukocytosis, temp 96.4 with worsening hypoxia requiring intubation. Suspected source left lower lobe infiltrate on CXR  · Thick, yellow/white secretions suctioned via ETT on arrival  · CXR with concern for left lower lobe infiltrate.  Patient does have aspiration risk factors with encephalopathy  · Strep pneumo/legionella urine antigens negative  · Procal 0.37 > 0.58 (8/25)  · Tracheal aspirate/sputum culture, pending  · Blood cultures negative  · 8/23: NG @ 4 days x 2  · 8/24: NG @ 72H x 2)  · COVID/flu/RSV negative  · UA unremarkable   · LA 1  · LP on previous admission negative, low utility for repeating LP at this time   · Patient hypotensive on arrival, but likely iatrogenic seconadary to sedation use. Levophed now off. Will defer fluids given profound hypoxia on high ventilator settings, possible component of volume overload on CXR    Plan:  · Antibiotics: Zosyn (vanco D/C'ed 8/26 after negative MRSA swab)  · Continue Zosyn Day 4/7 (Started 8/25-)  · Follow up pending culture data. V/S stable 8/28  · Trend WBC, fever curve and procal     Acute pulmonary embolism without acute cor pulmonale (HCC)  Assessment & Plan  Presented with hypoxia, initially admitted to the floor on supplemental NC  · CTA PE: Pulmonary embolism in the right lower lobe segmental pulmonary artery. Measured RV/LV ratio is within normal limits at less than 0.9. · BNP 9, initial HS troponin 9  · Initiated on heparin gtt, transitioned to therapeutic Lovenox on 8/26  · Developed worsening hypoxia 8/24, rapid response was called and patient was intubated for airway protection and hypoxic  · Now requiring high vent setting  · Echo completed 8/25: Left Ventricle: Left ventricular cavity size is normal. Wall thickness is normal. The left ventricular ejection fraction is 60% by visual estimation. . Wall motion is normal. Diastolic function is normal for age. Right Ventricle: Right ventricular cavity size is normal. Systolic function is normal. Normal tricuspid annular plane systolic excursion (TAPSE) > 1.7 cm. Plan:  · Heparin converted to therapeutic lovenox on 8/26  · Will check venous duplex LE b/l  · Will need transition to [address] on DC for 3 months    Hypertension  Assessment & Plan  Hx of hypertension.  PTA was on Torsemide 20 mg daily  · Echo this admission showed EF 60%, normal LV thickness, no systolic or diastolic dysfunction  · Hypotension s/p intubation initially requiring Levophed DC'ed on 8/27  · 8/28: BP's stable MAP 80-90's    Hyponatremia  Assessment & Plan  Improving  · Sodium 124 on admission, improved to 128 s/p NSS infusion at 50mL/hr  · 8/23: serum osmo 267, urine osmo 335, urine sodium 40 (on Torsemide PTA)  · Hold NSS for now  · 8/27: Sodium improving to 133   · Continue to closely trend BMP    Depression  Assessment & Plan  · Continue prozac  · Evaluated by psychiatry last admission, felt to have depressive mood disorder    History of LVH (left ventricular hypertrophy)  Assessment & Plan  Previous echocardiogram 6/2/23 showed EF 33%, normal diastolic dysfunction, mild left ventricular hypertrophy, mild RVSP elevation and mild TR  · PTA Med: Torsemide 20 mg daily  · Evaluated by Heart Failure team in July 2023. LVH thought to be secondary to hypertension, obesity, hx of meth use  · Recommended sleep study at that time   · Echo this admission shows normal EF of 60%, normal LV thickness, no diastolic dysfunction    Seminoma of left testis [facility])  Assessment & Plan  · Testicular cancer s/p left radial orchiectomy on 12/2022. Repeat CT scan in January 2023 with enlarging lymph node. Received Etoposide and cistoplatin chemotherapy in March, developed numbness/tingling and vision changes, labile affect and right sided weakness and therefore chemotherapy was stopped. MRI demonstrated white matter changes at that time and he was referred to Neurology   · Continue outpatient follow up with Heme/Onc     Umbilical hernia without obstruction and without gangrene  Assessment & Plan  · Umbilical hernia present, easily reducible        Patient Information Sharing  Mother Ileana Calzada updated over the phone by Dr. Renay Smith of patient's current status and treatment plan. All questions answered. ICU Core Measures     Vented Patient  VAP Bundle  VAP bundle ordered     A: Assess, Prevent, and Manage Pain · Has pain been assessed? Yes  · Need for changes to pain regimen?  No   B: Both Spontaneous Awakening Trials (SATs) and Spontaneous Breathing Trials (SBTs) · Plan to perform spontaneous awakening trial today? Yes   · Plan to perform spontaneous breathing trial today? Yes   · Obvious barriers to extubation? Yes   C: Choice of Sedation · RASS Goal: N/A patient not on sedation  · Need for changes to sedation or analgesia regimen? NA   D: Delirium · CAM-ICU: Unable to perform secondary to Acute cognitive dysfunction   E: Early Mobility  · Plan for early mobility? No   F: Family Engagement · Plan for family engagement today? Yes       Antibiotic Review: Awaiting culture results. and Continue broad spectrum secondary to severity of illness. Review of Invasive Devices:    Central access plan: No central line currently, midline and peripheral IV's in place    Prophylaxis:  VTE VTE covered by:  enoxaparin, Subcutaneous, 135 mg at 08/27/23 2109       Stress Ulcer  covered byomeprazole (PRILOSEC) suspension 2 mg/mL [[id number]]       Subjective   HPI/24hr events: Patient examined at bedside. Awake and responding to verbal stimuli. Given Lasix yesterday. Still net fluid balance positive 2.2L. V/S improving. Off sedation. Weaned vent settings overnight, FIO2 to 40, PEEP to 6.      Review of Systems   Unable to perform ROS: Intubated          Objective                            Vitals I/O      Most Recent Min/Max in 24hrs   Temp 97.7 °F (36.5 °C) Temp  Min: 97.3 °F (36.3 °C)  Max: 99.9 °F (37.7 °C)   Pulse 70 Pulse  Min: 69  Max: 90   Resp 14 Resp  Min: 14  Max: 29   /65 BP  Min: 105/68  Max: 138/71   O2 Sat 97 % SpO2  Min: 92 %  Max: 100 %      Intake/Output Summary (Last 24 hours) at 8/28/2023 0741  Last data filed at 8/28/2023 0600  Gross per 24 hour   Intake 3400 ml   Output 1150 ml   Net 2250 ml         Diet Enteral/Parenteral; Tube Feeding No Oral Diet; Jevity 1.2 Scott; Continuous; 70; Prosource Protein Liquid - One Packet Daily; 140; Water; Every 4 hours     Invasive Monitoring Physical exam   N/A Physical Exam  Eyes:      Extraocular Movements: Extraocular movements intact. Skin:     General: Skin is warm and dry. Cardiovascular:      Rate and Rhythm: Normal rate and regular rhythm. Heart sounds: Normal heart sounds. Musculoskeletal:      Right lower leg: Edema present. Left lower leg: Edema present. Abdominal:      Tenderness: There is no guarding. Comments: Umbilical hernia reducible   Constitutional:       General: He is not in acute distress. Interventions: He is intubated. Pulmonary:      Effort: No respiratory distress. He is intubated. Breath sounds: No wheezing or rhonchi. Comments: Dec lower breath sounds  Neurological:      Mental Status: He is alert. He is calm. GCS: GCS eye subscore is 4. GCS verbal subscore is 1. GCS motor subscore is 6. Comments: Moving all 4 extremities to verbal commands, but weak and slow movements. Able to wiggle toes and squeeze with hands bilaterally   Vitals reviewed. Diagnostic Studies      EK/23 NSR  Imaging: CXR, CT Chest I have personally reviewed pertinent reports.    and I have personally reviewed pertinent films in PACS     Medications:  Scheduled PRN   chlorhexidine, 15 mL, Q12H [address]  enoxaparin, 1 mg/kg, Q12H ISAEL  FLUoxetine, 10 mg, Daily  omeprazole (PRILOSEC) suspension 2 mg/mL, 20 mg, Daily  piperacillin-tazobactam, 3.375 g, Q6H  polyethylene glycol, 17 g, Daily  senna, 17.6 mg, BID  sodium chloride, 4 mL, Q6H  thiamine, 100 mg, Daily      acetaminophen, 650 mg, Q4H PRN  fentanyl citrate (PF), 50 mcg, Q1H PRN       Continuous          Labs:    CBC    Recent Labs     23  0546   WBC 6.04   HGB 10.3*   HCT 32.2*        BMP    Recent Labs     23  2206 23  0546   SODIUM 133* 133*   K 3.7 3.7   CL 93* 94*   CO2 36* 35*   AGAP 4 4   BUN 9 8   CREATININE 0.90 0.80   CALCIUM 8.6 8.6       Coags    Recent Labs     23  0817   PTT 93*        Additional Electrolytes  Recent Labs     23  0546   MG 2.0   PHOS 3.7 Blood Gas    Recent Labs     08/27/23  0546   PHART 7.415   CHN0CKM 48.4*   PO2ART 114.6   CYH5YEB 30.3*   BEART 5.0   SOURCE Line, Arterial     Recent Labs     08/27/23  0546   SOURCE Line, Arterial    LFTs  No recent results    Infectious  No recent results  Glucose  Recent Labs     08/26/23  2206 08/27/23  0546   GLUC 94 102               Critical Care Time Delivered: Upon my evaluation, this patient had a high probability of imminent or life-threatening deterioration due to acute respiratory failure which required my direct attention, intervention, and personal management. I have personally provided 35 minutes of critical care time, exclusive of procedures, teaching, family meetings, and any prior time recorded by providers other than myself.      Medhat Gallardo MD

## 2023-08-28 NOTE — ASSESSMENT & PLAN NOTE
41year old male with L testicular seminoma with known lymphadenopathy s/p radical L orchiectomy, chemotherapy stopped due to side effects, recent hospitalization with altered mental status admitted with hypoxia and altered mental status with extensive work-up including MRI brain with and without contrast, LP, vEEG monitoring. Patient noted to have PE on admission and was intubated and transferred to [address] [place] [address] for critical care management. Etiology of encephalopathy is unclear at this time. Patient appears to be improving from mentation standpoint and is able to briskly follow commands, but noted to continue to have significant weakness. Plan:   - Will discuss with neurology attending. Please see attestation for additional recommendations/details. - Consider repeat LP to evaluate cytology/flow cytometry, but will discuss further with attending neurologist.   - ENS-2 panel and Orexin A is pending. Anti-Kole pending.   - Anti-microsomal antibody negative. - Continue with supportive care. - Continue with thiamine supplementation 100 mg QD.   - Monitor exam and notify with changes.

## 2023-08-28 NOTE — ASSESSMENT & PLAN NOTE
- Continue on therapeutic dose of Lovenox. - Venous dopplers B/L LE pending.   - Echocardiogram completed and demonstrates EF 60%.

## 2023-08-28 NOTE — ASSESSMENT & PLAN NOTE
Hx of hypertension.  PTA was on Torsemide 20 mg daily  · Echo this admission showed EF 60%, normal LV thickness, no systolic or diastolic dysfunction  · Hypotension s/p intubation initially requiring Levophed DC'ed on 8/27  · 8/28: BP's stable MAP 80-90's

## 2023-08-28 NOTE — PROGRESS NOTES
Progress Note - Neurology   Auzl Mckinley 41 y.o. 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 Patient noted to have PE on admission and was intubated and transferred to [address] [place] [address] for critical care management. Etiology of encephalopathy is unclear at this time. Patient appears to be improving from mentation standpoint and is able to briskly follow commands, but noted to continue to have significant weakness. Plan:   - Will discuss with neurology attending. Please see attestation for additional recommendations/details. - Consider repeat LP to evaluate cytology/flow cytometry, but will discuss further with attending neurologist.   - ENS-2 panel and Orexin A is pending. Anti-Kole pending.   - Anti-microsomal antibody negative. - Continue with supportive care. - Continue with thiamine supplementation 100 mg QD.   - Monitor exam and notify with changes. Acute respiratory failure with hypoxia [facility])  Assessment & Plan  - Ventilator management as per critical care team.   - Continue on Zosyn for possible pneumonia as per critical care team.     Acute pulmonary embolism without acute cor pulmonale ([address])  Assessment & Plan  - Continue on therapeutic dose of Lovenox. - Venous dopplers B/L LE pending.   - Echocardiogram completed and demonstrates EF 60%. Seminoma of left testis [facility])  Assessment & Plan  - s/p radical L orchiectomy in December 2022. - Chemotherapy was stopped due to side effects.      Hypertension  Assessment & Plan  - Goal normotension.   - Management as per critical care team.    Depression  Assessment & Plan  - Continue on Prozac. - Consider psychiatry evaluation once extubated. Recommendations for outpatient neurological follow up have yet to be determined. Subjective:   Discussed with critical care team, patient remains very weak but is able to follow simple commands. He nods head "no" when asked if he has any pain or discomfort. Unclear etiology of symptoms at this time. Patient with history of L testicular seminoma initially noted on imaging in June 2021 but patient did not follow-up. He was evaluated by urology in December 2022 and imaging demonstrated lymphadenopathy and L testicular mass. He underwent radical L orchiectomy in December 2022. He received EP chemotherapy March 2023 but he reported tingling and numbness so cisplatin was stopped. Chemotherapy was eventually discontinued secondary to double vision, R sided weakness, labile affect, MRI brain with white matter changes. Chart reviewed, patient was seen by inpatient neurology team on August 12, 2023 at [address] [place] [address]. Per notes, patient with morbid obesity, testicular CA s/p chemotherapy, HTN, bipolar depression. He was noted to be somnolent on admission at that time and was ultimately intubated for airway protection. Neurology was asked to evaluate at that time for possible seizure. MRI brain with and without contrast was recommended which demonstrated stable nonspecific enhancement along the anterior floor of the third ventricle/hypothalamus when comparing to the March 15, 2023 study and patient was placed on vEEG monitoring to evaluate for seizure.  vEEG demonstrated intermittent excessive diffuse delta activity during drowsiness and excessive amount of slow wave and REM sleep periods, two episodes of leg and body movements that occurred during REM sleep suggesting loss of REM sleep atonia, and excessive amount of sleep, possible early transition to REM sleep and history of excessive daytime somnolence which may suggest a REM sleep behavior disorder. Etiology of symptoms was unclear, but there was concern for possible Wernicke's encephalopathy and he was started on high dose of IV thiamine. LP was completed and results benign thus far: protein 46, glucose 59, WBCs 9, RBCs 2500, culture with no growth, ME panel negative, Lyme titer IgG/IgM negative, cryptococcal antigen negative, Lyme PCR negative, fungal culture negative to date. ENS-2 panel pending and Orexin A pending. Vit B1 was normal at 84.0. He was noted to improve throughout admission. Etiology of symptoms was unclear with differential including Wernicke's encephalopathy vs narcolepsy given vEEG findings vs paraneoplastic vs psychogenic in the setting of cancer diagnosis. He was ultimately discharged to his sister's home with home PT/OT/Speech and visiting nurses. He presented back to the hospital, [address], on August 23, 2023 with hypoxia that was noted by visiting nurses. He was found to have PE and was started on Heparin gtt and was intubated for worsened encephalopathy and hypoxia. He was transferred to [address] [place] [address] for critical care. He was seen by Dr. Lola Howard on August 25, 2023 and at that time, continued supportive care was recommended and continued to await paraneoplastic labs prior to considering IVIG/steroids as suspicion was low for paraneoplastic etiology.       ROS:  Unable to reliably assess secondary to mental status    Medications  Scheduled Meds:  Current Facility-Administered Medications   Medication Dose Route Frequency Provider Last Rate   • acetaminophen  650 mg Oral D0Q PRN Eleanor Castrejon PA-C     • chlorhexidine  15 mL Mouth/Throat Q12H [address] [facility], [address] [place] [address]     • enoxaparin  1 mg/kg Subcutaneous P77W [address] [facility] Kemar Cook PA-C     • fentanyl citrate (PF)  50 mcg Intravenous Q1H PRN AMBER Martin     • FLUoxetine  10 mg Oral Daily Juvenal Duvall AMBER Lazaro     • furosemide  40 mg Intravenous Once [employer], CRNP     • omeprazole (PRILOSEC) suspension 2 mg/mL  20 mg Oral Daily Eliceo Cook PA-C     • piperacillin-tazobactam  3.375 g Intravenous O9G Eliceo Cook PA-C 4.758 g (08/28/23 0947)   • polyethylene glycol  17 g Oral Daily Bebo Brooks PA-C     • senna  17.6 mg Oral BID Eliceo Cook PA-C     • sodium chloride  4 mL Nebulization Q6H AMBER Vides     • thiamine  100 mg Oral Daily Eliceo Cook PA-C       Continuous Infusions:   PRN Meds:.•  acetaminophen  •  fentanyl citrate (PF)    Vitals: Blood pressure 117/71, pulse 65, temperature 97.5 °F (36.4 °C), resp. rate 17, height 6' 4" (1.93 m), weight (!) 143 kg (315 lb 4.1 oz), SpO2 98 %. ,Body mass index is 38.37 kg/m². Physical Exam: /82   Pulse 70   Temp 97.9 °F (36.6 °C)   Resp (!) 24   Ht 6' 4" (1.93 m)   Wt (!) 143 kg (315 lb 4.1 oz)   SpO2 96%   BMI 38.37 kg/m²   General appearance: Awakens easily to voice, able to follow simple commands. Head: Normocephalic, without obvious abnormality, atraumatic  Eyes: negative findings: lids and lashes normal, conjunctivae and sclerae normal, pupils equal, round, reactive to light and accomodation and blink to threat intact B/L  Lungs: clear to auscultation bilaterally and intubated and remains on ventilator  Heart: regular rate and rhythm  Abdomen: Soft, not distended  Extremities: extremities normal, warm and well-perfused; no cyanosis, clubbing, or edema  Skin: Skin color, texture, turgor normal. No rashes or lesions  Neurologic: Mental status: Awakens easily to voice, able to follow simple commands x 4. Cranial nerves: II: pupils equal, round, reactive to light and accommodation, III,VII: ptosis no ptosis noted  Sensory: Sensation grossly intact to crude touch throughout  Motor: Moving all extremities and follows simple commands x 4. Unable to lift arms or legs antigravity. Labs  Recent Results (from the past 48 hour(s))   Basic metabolic panel    Collection Time: 08/26/23 10:06 PM   Result Value Ref Range    Sodium 133 (L) 135 - 147 mmol/L    Potassium 3.7 3.5 - 5.3 mmol/L    Chloride 93 (L) 96 - 108 mmol/L    CO2 36 (H) 21 - 32 mmol/L    ANION GAP 4 mmol/L    BUN 9 5 - 25 mg/dL    Creatinine 0.90 0.60 - 1.30 mg/dL    Glucose 94 65 - 140 mg/dL    Calcium 8.6 8.4 - 10.2 mg/dL    eGFR 109 ml/min/1.73sq m   Blood gas, arterial    Collection Time: 08/27/23  5:46 AM   Result Value Ref Range    pH, Arterial 7.415 7.350 - 7.450    PH ART TC 7.418 7.350 - 7.450    pCO2, Arterial 48.4 (H) 36.0 - 44.0 mm Hg    PCO2 (TC) Arterial 48.0 (H) 36.0 - 44.0 mm Hg    pO2, Arterial 114.6 75.0 - 129.0 mm Hg    PO2 (TC) Arterial 113.4 75.0 - 129.0 mm Hg    HCO3, Arterial 30.3 (H) 22.0 - 28.0 mmol/L    Base Excess, Arterial 5.0 mmol/L    O2 Content, Arterial 15.2 (L) 16.0 - 23.0 mL/dL    O2 HGB,Arterial  97.7 (H) 94.0 - 97.0 %    SOURCE Line, Arterial     Temperature 98.2 Degrees Fehrenheit    Vent Type- AC AC     AC Rate 14     Tidal Volume 500 ml    Inspired Air (FIO2) 70     PEEP 12    CBC and differential    Collection Time: 08/27/23  5:46 AM   Result Value Ref Range    WBC 6.04 4.31 - 10.16 Thousand/uL    RBC 3.24 (L) 3.88 - 5.62 Million/uL    Hemoglobin 10.3 (L) 12.0 - 17.0 g/dL    Hematocrit 32.2 (L) 36.5 - 49.3 %    MCV 99 (H) 82 - 98 fL    MCH 31.8 26.8 - 34.3 pg    MCHC 32.0 31.4 - 37.4 g/dL    RDW 14.6 11.6 - 15.1 %    MPV 8.8 (L) 8.9 - 12.7 fL    Platelets 980 009 - 781 Thousands/uL    nRBC 0 /100 WBCs    Neutrophils Relative 69 43 - 75 %    Immat GRANS % 1 0 - 2 %    Lymphocytes Relative 17 14 - 44 %    Monocytes Relative 10 4 - 12 %    Eosinophils Relative 2 0 - 6 %    Basophils Relative 1 0 - 1 %    Neutrophils Absolute 4.25 1.85 - 7.62 Thousands/µL    Immature Grans Absolute 0.03 0.00 - 0.20 Thousand/uL    Lymphocytes Absolute 1.02 0.60 - 4.47 Thousands/µL    Monocytes Absolute 0.59 0.17 - 1.22 Thousand/µL    Eosinophils Absolute 0.11 0.00 - 0.61 Thousand/µL    Basophils Absolute 0.04 0.00 - 0.10 Thousands/µL   Basic metabolic panel    Collection Time: 08/27/23  5:46 AM   Result Value Ref Range    Sodium 133 (L) 135 - 147 mmol/L    Potassium 3.7 3.5 - 5.3 mmol/L    Chloride 94 (L) 96 - 108 mmol/L    CO2 35 (H) 21 - 32 mmol/L    ANION GAP 4 mmol/L    BUN 8 5 - 25 mg/dL    Creatinine 0.80 0.60 - 1.30 mg/dL    Glucose 102 65 - 140 mg/dL    Calcium 8.6 8.4 - 10.2 mg/dL    eGFR 114 ml/min/1.73sq m   Magnesium    Collection Time: 08/27/23  5:46 AM   Result Value Ref Range    Magnesium 2.0 1.9 - 2.7 mg/dL   Phosphorus    Collection Time: 08/27/23  5:46 AM   Result Value Ref Range    Phosphorus 3.7 2.7 - 4.5 mg/dL     Imaging   No new neuro imaging available for review. VTE Prophylaxis: Enoxaparin (Lovenox)    Counseling / Coordination of Care  Total Critical Care time spent 45 minutes excluding procedures, teaching and family updates.

## 2023-08-28 NOTE — ASSESSMENT & PLAN NOTE
Improving  Sepsis vs SIRS present on arrival to [facility] by leukocytosis, temp 96.4 with worsening hypoxia requiring intubation. Suspected source left lower lobe infiltrate on CXR  · Thick, yellow/white secretions suctioned via ETT on arrival  · CXR with concern for left lower lobe infiltrate. Patient does have aspiration risk factors with encephalopathy  · Strep pneumo/legionella urine antigens negative  · Procal 0.37 > 0.58 (8/25)  · Tracheal aspirate/sputum culture, pending  · Blood cultures negative  · 8/23: NG @ 4 days x 2  · 8/24: NG @ 72H x 2)  · COVID/flu/RSV negative  · UA unremarkable   · LA 1  · LP on previous admission negative, low utility for repeating LP at this time   · Patient hypotensive on arrival, but likely iatrogenic seconadary to sedation use. Levophed now off. Will defer fluids given profound hypoxia on high ventilator settings, possible component of volume overload on CXR    Plan:  · Antibiotics: Zosyn (vanco D/C'ed 8/26 after negative MRSA swab)  · Continue Zosyn Day 4/7 (Started 8/25-)  · Follow up pending culture data.  V/S stable 8/28  · Trend WBC, fever curve and procal

## 2023-08-28 NOTE — CONSULTS
Please see consult completed by Dr. Marichuy Cardenas on 8/25/2023 as well as subsequent progress notes.

## 2023-08-28 NOTE — ASSESSMENT & PLAN NOTE
Patient presented with hypoxia, found to have right lower lobe segmental PE. Initially saturating well on nasal cannula. Rapid response was called 8/24 secondary to hypoxia and altered mental status requiring emergent intubation. Hypoxic post intubation requiring high ventilator settings. · CTA PE study 8/23: Pulmonary embolism in the right lower lobe segmental pulmonary artery. Measured RV/LV ratio is within normal limits at less than 0.9.  · Initial CXR post intubation: Near complete opacity of the left lung likely related to worsening infiltrate and/or atelectasis. Patchy right upper lobe infiltrates. · Strep pneumo/legionella urine antigen negative  · COVID/Flu/RSV negative  · 8/25: Bronchoscopy: poorly tolerated secondary to hypoxia. Some mucus plugging present on the left. · 8/26: CT chest: Complete right lower lobe and near complete left lower lobe atelectasis. Pneumonia not excluded in the appropriate clinical setting. Mild interstitial edema with trace effusions  · Net I/O +2.2L.  LE edema on exam. Will diurese additional IV Lasix 40 mg today, rpt BMP  · 8/27 ABG 7.41/48.4/113.4/30.3    Plan:  · 8/28 Current vent settings:  · ACVC 14/500/40/6  · Wean Fio2 for goal Sp02 >90  · Empiric antibiotics with Zosyn (Vanco D/C'ed with negative MRSA surveillance) for possible pneumonia  · Antibiotic: Zosyn Day 4/7 (Started 8/25)  · Hypertonic saline nebs given thick secretions  · Chest PT, Pulmonary hygiene  · VAP ppx

## 2023-08-28 NOTE — ASSESSMENT & PLAN NOTE
- Ventilator management as per critical care team.   - Continue on Zosyn for possible pneumonia as per critical care team.

## 2023-08-28 NOTE — UTILIZATION REVIEW
Continued Stay Review    Date: 08/28/2023                          Current Patient Class: Inpatient  Current Level of Care: Critical Care    HPI:38 y.o. male initially admitted on 08/24/2023     Assessment/Plan:   Acute Respiratory Failure with Hypoxia. Encephalopathy. Cardio-Pulmonary Monitoring. Intubated / Vented. Continue IV abx;  Zosyn. Hypertonic saline nebs q6h, given thick secretions. Chest PT, Pulmonary hygiene. Neuro Checks.       Vital Signs: /76 (BP Location: Right arm)   Pulse 72   Temp 97.9 °F (36.6 °C) (Esophageal)   Resp 18   Ht 6' 4" (1.93 m)   Wt (!) 143 kg (315 lb 4.1 oz)   SpO2 97%   BMI 38.37 kg/m²   Date and Time Eye Opening Best Verbal Response Best Motor Response [place] Coma Scale Score   08/28/23 1200 3 1 6 10   08/28/23 0800 3 1 6 10       Pertinent Labs/Diagnostic Results:   Results from last 7 days   Lab Units 08/24/23 2138   SARS-COV-2  Negative     Results from last 7 days   Lab Units 08/27/23  0546 08/26/23  0541 08/25/23  0600 08/24/23 2024 08/23/23  1435   WBC Thousand/uL 6.04 5.80 9.73 15.38* 10.11   HEMOGLOBIN g/dL 10.3* 10.5* 12.1 13.0 14.3   HEMATOCRIT % 32.2* 31.4* 36.4* 38.5 41.6   PLATELETS Thousands/uL 211 206 249 301 358   NEUTROS ABS Thousands/µL 4.25  --  7.97*  --  7.95*     Results from last 7 days   Lab Units 08/27/23  0546 08/26/23 2206 08/26/23  0541 08/25/23  0600 08/24/23 2024 08/24/23  0553 08/23/23  2312   SODIUM mmol/L 133* 133* 131* 131* 129*   < > 126*  126*   POTASSIUM mmol/L 3.7 3.7 3.2* 3.8 3.5   < > 3.5  3.5   CHLORIDE mmol/L 94* 93* 90* 88* 86*   < > 80*  81*   CO2 mmol/L 35* 36* 37* 38* 35*   < > 41*  38*   ANION GAP mmol/L 4 4 4 5 8   < > 5  7   BUN mg/dL 8 9 7 8 8   < > 8  8   CREATININE mg/dL 0.80 0.90 0.88 0.66 0.59*   < > 0.80  0.75   EGFR ml/min/1.73sq m 114 109 110 124 130   < > 114  118   CALCIUM mg/dL 8.6 8.6 8.4 8.8 8.9   < > 8.1*  8.2*   CALCIUM, IONIZED mmol/L  --   --   --  1.08*  --   --   --    MAGNESIUM mg/dL 2.0  --   --  1.8*  --   --  2.3   PHOSPHORUS mg/dL 3.7  --   --   --   --   --   --     < > = values in this interval not displayed. Results from last 7 days   Lab Units 08/26/23  0541 08/25/23  0600 08/23/23  1435   AST U/L 23 33 29   ALT U/L 55* 74* 73*   ALK PHOS U/L 90 113* 123*   TOTAL PROTEIN g/dL 5.5* 6.0* 7.5   ALBUMIN g/dL 3.1* 3.3* 4.2   TOTAL BILIRUBIN mg/dL 0.45 0.53 0.52     Results from last 7 days   Lab Units 08/24/23  1357   POC GLUCOSE mg/dl 208*     Results from last 7 days   Lab Units 08/27/23  0546 08/26/23  2206 08/26/23  0541 08/25/23  0600 08/24/23  2024 08/24/23  0834 08/24/23  0553 08/23/23  2312 08/23/23  1435   GLUCOSE RANDOM mg/dL 102 94 104 107 112 97 102 111  109 130     Results from last 7 days   Lab Units 08/23/23  2312   OSMOLALITY, SERUM mmol/*      Results from last 7 days   Lab Units 08/27/23  0546 08/26/23  0816 08/26/23  0434   PH ART  7.415 7.424 7.404   PCO2 ART mm Hg 48.4* 53.7* 57.4*   PO2 ART mm Hg 114.6 92.7 107.6   HCO3 ART mmol/L 30.3* 34.4* 35.1*   BASE EXC ART mmol/L 5.0 8.5 8.7   O2 CONTENT ART mL/dL 15.2* 15.4* 15.5*   O2 HGB, ARTERIAL % 97.7* 96.3 97.3*   ABG SOURCE  Line, Arterial Line, Arterial Line, Arterial     Results from last 7 days   Lab Units 08/24/23  1534   PH SYLVESTER  7.255*   PCO2 SYLVESTER mm Hg 76.1*   PO2 SYLVESTER mm Hg 59.8*   HCO3 SYLVESTER mmol/L 33.0*   BASE EXC SYLVESTER mmol/L 3.4   O2 CONTENT SYLVESTER ml/dL 17.0   O2 HGB, VENOUS % 87.0*     Results from last 7 days   Lab Units 08/23/23  1435   D-DIMER QUANTITATIVE ug/ml FEU 1.11*     Results from last 7 days   Lab Units 08/26/23  0817 08/26/23  0139 08/25/23 2007 08/24/23  0038 08/23/23  1435   PROTIME seconds  --   --   --   --  13.1   INR   --   --   --   --  0.96   PTT seconds 93* 59* 67*   < > 28    < > = values in this interval not displayed.      Results from last 7 days   Lab Units 08/25/23  0600   TSH 3RD GENERATON uIU/mL 2.563     Results from last 7 days   Lab Units 08/25/23  0600 08/24/23 2024 PROCALCITONIN ng/ml 0.58* 0.37*     Results from last 7 days   Lab Units 08/24/23 2024 08/23/23  1444   LACTIC ACID mmol/L 1.0 1.9     Results from last 7 days   Lab Units 08/23/23  1435   BNP pg/mL 9     Results from last 7 days   Lab Units 08/23/23 2329 08/23/23  2312   OSMOLALITY, SERUM mmol/KG  --  267*   OSMO UR mmol/  --      Results from last 7 days   Lab Units 08/24/23 2012 08/23/23 2329   CLARITY UA  Clear  --    COLOR UA  Yellow  --    SPEC GRAV UA  >=1.030  --    PH UA  6.0  --    GLUCOSE UA mg/dl Negative  --    KETONES UA mg/dl Negative  --    BLOOD UA  Negative  --    PROTEIN UA mg/dl 30 (1+)*  --    NITRITE UA  Negative  --    BILIRUBIN UA  Negative  --    UROBILINOGEN UA E.U./dl 1.0  --    LEUKOCYTES UA  Negative  --    WBC UA /hpf 10-20*  --    RBC UA /hpf 0-1  --    BACTERIA UA /hpf Occasional  --    EPITHELIAL CELLS WET PREP /hpf Occasional  --    MUCUS THREADS  Moderate*  --    SODIUM UR   --  40     Results from last 7 days   Lab Units 08/24/23 2138 08/24/23 2013   STREP PNEUMONIAE ANTIGEN, URINE   --  Negative   LEGIONELLA URINARY ANTIGEN   --  Negative   INFLUENZA A PCR  Negative  --    INFLUENZA B PCR  Negative  --    RSV PCR  Negative  --      Results from last 7 days   Lab Units 08/24/23 2110 08/24/23 2029 08/24/23 2025 08/24/23 2012 08/23/23  1444 08/23/23  1435   BLOOD CULTURE   --  No Growth at 72 hrs. No Growth at 72 hrs.  --  No Growth After 4 Days. No Growth After 4 Days.    SPUTUM CULTURE  1 colony Beta Hemolytic Streptococcus Group C*  1+ Growth of  --   --   --   --   --    GRAM STAIN RESULT  1+ Epithelial cells per low power field*  3+ Polys*  1+ Gram positive cocci in pairs*  --   --   --   --   --    URINE CULTURE   --   --   --  No Growth <1000 cfu/mL  --   --      Results from last 7 days   Lab Units 08/25/23  1627   TOTAL COUNTED  100     Results from last 7 days   Lab Units 08/26/23  0541   VANCOMYCIN TR ug/mL 11.1       Medications:   Scheduled Medications:  chlorhexidine, 15 mL, Mouth/Throat, Q12H ISAEL  enoxaparin, 1 mg/kg, Subcutaneous, Q12H ISAEL  FLUoxetine, 10 mg, Oral, Daily  omeprazole (PRILOSEC) suspension 2 mg/mL, 20 mg, Oral, Daily  piperacillin-tazobactam, 3.375 g, Intravenous, Q6H  sodium chloride, 4 mL, Nebulization, Q6H  thiamine, 100 mg, Oral, Daily      Continuous IV Infusions:     PRN Meds:  acetaminophen, 650 mg, Oral, Q4H PRN  fentanyl citrate (PF), 50 mcg, Intravenous, Q1H PRN  [START ON 8/29/2023] polyethylene glycol, 17 g, Oral, Daily PRN  [START ON 8/29/2023] senna, 17.6 mg, Oral, Daily PRN        Discharge Plan: [facility]    Network Utilization Review Department  ATTENTION: Please call with any questions or concerns to [phone] and carefully listen to the prompts so that you are directed to the right person. All voicemails are confidential.  Anna Dorcas all requests for admission clinical reviews, approved or denied determinations and any other requests to dedicated fax number below belonging to the campus where the patient is receiving treatment.  List of dedicated fax numbers for the Facilities:  [place] DENIALS (Administrative/Medical Necessity) [phone] [address] [facility] (M[facility]/P[facility]) [phone]   [address] [place] [address] Drive [phone]   [facility] [address] [facility] [phone]   [address] [address] [facility] [phone]   [address] [facility] [address] [facility]  [address]  [phone]

## 2023-08-29 LAB
ANION GAP SERPL CALCULATED.3IONS-SCNC: 7 MMOL/L
BACTERIA BLD CULT: NORMAL
BACTERIA BLD CULT: NORMAL
BUN SERPL-MCNC: 9 MG/DL (ref 5–25)
CALCIUM SERPL-MCNC: 9.1 MG/DL (ref 8.4–10.2)
CHLORIDE SERPL-SCNC: 97 MMOL/L (ref 96–108)
CO2 SERPL-SCNC: 34 MMOL/L (ref 21–32)
CREAT SERPL-MCNC: 0.86 MG/DL (ref 0.6–1.3)
ERYTHROCYTE [DISTWIDTH] IN BLOOD BY AUTOMATED COUNT: 14.6 % (ref 11.6–15.1)
GFR SERPL CREATININE-BSD FRML MDRD: 111 ML/MIN/1.73SQ M
GLUCOSE SERPL-MCNC: 91 MG/DL (ref 65–140)
HCT VFR BLD AUTO: 36.5 % (ref 36.5–49.3)
HGB BLD-MCNC: 11.5 G/DL (ref 12–17)
MAGNESIUM SERPL-MCNC: 2.3 MG/DL (ref 1.9–2.7)
MCH RBC QN AUTO: 31.3 PG (ref 26.8–34.3)
MCHC RBC AUTO-ENTMCNC: 31.5 G/DL (ref 31.4–37.4)
MCV RBC AUTO: 99 FL (ref 82–98)
PHOSPHATE SERPL-MCNC: 4.1 MG/DL (ref 2.7–4.5)
PLATELET # BLD AUTO: 225 THOUSANDS/UL (ref 149–390)
PMV BLD AUTO: 9.2 FL (ref 8.9–12.7)
POTASSIUM SERPL-SCNC: 3.9 MMOL/L (ref 3.5–5.3)
RBC # BLD AUTO: 3.68 MILLION/UL (ref 3.88–5.62)
SODIUM SERPL-SCNC: 138 MMOL/L (ref 135–147)
WBC # BLD AUTO: 8.64 THOUSAND/UL (ref 4.31–10.16)

## 2023-08-29 PROCEDURE — 94669 MECHANICAL CHEST WALL OSCILL: CPT

## 2023-08-29 PROCEDURE — 84100 ASSAY OF PHOSPHORUS: CPT | Performed by: NURSE PRACTITIONER

## 2023-08-29 PROCEDURE — 85027 COMPLETE CBC AUTOMATED: CPT | Performed by: NURSE PRACTITIONER

## 2023-08-29 PROCEDURE — 94003 VENT MGMT INPAT SUBQ DAY: CPT

## 2023-08-29 PROCEDURE — 94640 AIRWAY INHALATION TREATMENT: CPT

## 2023-08-29 PROCEDURE — 94668 MNPJ CHEST WALL SBSQ: CPT

## 2023-08-29 PROCEDURE — 83735 ASSAY OF MAGNESIUM: CPT | Performed by: NURSE PRACTITIONER

## 2023-08-29 PROCEDURE — 94150 VITAL CAPACITY TEST: CPT

## 2023-08-29 PROCEDURE — 80048 BASIC METABOLIC PNL TOTAL CA: CPT | Performed by: NURSE PRACTITIONER

## 2023-08-29 PROCEDURE — 99291 CRITICAL CARE FIRST HOUR: CPT | Performed by: INTERNAL MEDICINE

## 2023-08-29 PROCEDURE — G0180 MD CERTIFICATION HHA PATIENT: HCPCS | Performed by: INTERNAL MEDICINE

## 2023-08-29 PROCEDURE — 94760 N-INVAS EAR/PLS OXIMETRY 1: CPT

## 2023-08-29 RX ORDER — FUROSEMIDE 10 MG/ML
40 INJECTION INTRAMUSCULAR; INTRAVENOUS ONCE
Status: COMPLETED | OUTPATIENT
Start: 2023-08-29 | End: 2023-08-29

## 2023-08-29 RX ORDER — POTASSIUM CHLORIDE 14.9 MG/ML
20 INJECTION INTRAVENOUS ONCE
Status: COMPLETED | OUTPATIENT
Start: 2023-08-29 | End: 2023-08-29

## 2023-08-29 RX ORDER — ALBUTEROL SULFATE 2.5 MG/3ML
2.5 SOLUTION RESPIRATORY (INHALATION)
Status: DISCONTINUED | OUTPATIENT
Start: 2023-08-29 | End: 2023-09-20

## 2023-08-29 RX ADMIN — ALBUTEROL SULFATE 2.5 MG: 2.5 SOLUTION RESPIRATORY (INHALATION) at 20:06

## 2023-08-29 RX ADMIN — ENOXAPARIN SODIUM 135 MG: 150 INJECTION SUBCUTANEOUS at 20:01

## 2023-08-29 RX ADMIN — PIPERACILLIN AND TAZOBACTAM 3.38 G: 36; 4.5 INJECTION, POWDER, FOR SOLUTION INTRAVENOUS at 22:20

## 2023-08-29 RX ADMIN — CHLORHEXIDINE GLUCONATE 15 ML: 1.2 RINSE ORAL at 08:51

## 2023-08-29 RX ADMIN — Medication 20 MG: at 08:52

## 2023-08-29 RX ADMIN — FENTANYL CITRATE 50 MCG: 50 INJECTION, SOLUTION INTRAMUSCULAR; INTRAVENOUS at 00:50

## 2023-08-29 RX ADMIN — SODIUM CHLORIDE SOLN NEBU 3% 4 ML: 3 NEBU SOLN at 13:18

## 2023-08-29 RX ADMIN — FLUOXETINE 10 MG: 10 CAPSULE ORAL at 08:50

## 2023-08-29 RX ADMIN — ALBUTEROL SULFATE 2.5 MG: 2.5 SOLUTION RESPIRATORY (INHALATION) at 07:23

## 2023-08-29 RX ADMIN — SODIUM CHLORIDE SOLN NEBU 3% 4 ML: 3 NEBU SOLN at 07:23

## 2023-08-29 RX ADMIN — THIAMINE HCL TAB 100 MG 100 MG: 100 TAB at 08:50

## 2023-08-29 RX ADMIN — ALBUTEROL SULFATE 2.5 MG: 2.5 SOLUTION RESPIRATORY (INHALATION) at 13:18

## 2023-08-29 RX ADMIN — SODIUM CHLORIDE SOLN NEBU 3% 4 ML: 3 NEBU SOLN at 02:13

## 2023-08-29 RX ADMIN — PIPERACILLIN AND TAZOBACTAM 3.38 G: 36; 4.5 INJECTION, POWDER, FOR SOLUTION INTRAVENOUS at 04:39

## 2023-08-29 RX ADMIN — PIPERACILLIN AND TAZOBACTAM 3.38 G: 36; 4.5 INJECTION, POWDER, FOR SOLUTION INTRAVENOUS at 10:44

## 2023-08-29 RX ADMIN — CHLORHEXIDINE GLUCONATE 15 ML: 1.2 RINSE ORAL at 20:01

## 2023-08-29 RX ADMIN — PIPERACILLIN AND TAZOBACTAM 3.38 G: 36; 4.5 INJECTION, POWDER, FOR SOLUTION INTRAVENOUS at 16:11

## 2023-08-29 RX ADMIN — ENOXAPARIN SODIUM 135 MG: 150 INJECTION SUBCUTANEOUS at 08:53

## 2023-08-29 RX ADMIN — SODIUM CHLORIDE SOLN NEBU 3% 4 ML: 3 NEBU SOLN at 20:06

## 2023-08-29 RX ADMIN — FUROSEMIDE 40 MG: 10 INJECTION, SOLUTION INTRAMUSCULAR; INTRAVENOUS at 16:39

## 2023-08-29 RX ADMIN — POTASSIUM CHLORIDE 20 MEQ: 14.9 INJECTION, SOLUTION INTRAVENOUS at 07:11

## 2023-08-29 NOTE — ASSESSMENT & PLAN NOTE
Presented with hypoxia, initially admitted to the floor on supplemental NC  · CTA PE: Pulmonary embolism in the right lower lobe segmental pulmonary artery. Measured RV/LV ratio is within normal limits at less than 0.9. · BNP 9, initial HS troponin 9  · Initiated on heparin gtt, transitioned to therapeutic Lovenox on 8/26  · Developed worsening hypoxia 8/24, rapid response was called and patient was intubated for airway protection and hypoxic  · Now requiring high vent setting  · Echo completed 8/25: Left Ventricle: Left ventricular cavity size is normal. Wall thickness is normal. The left ventricular ejection fraction is 60% by visual estimation. . Wall motion is normal. Diastolic function is normal for age. Right Ventricle: Right ventricular cavity size is normal. Systolic function is normal. Normal tricuspid annular plane systolic excursion (TAPSE) > 1.7 cm.     Plan:  · Heparin converted to therapeutic lovenox on 8/26  · Will need transition to [address] on DC for 3 months  · Dopplers of LE neg for DVT bilaterally

## 2023-08-29 NOTE — ASSESSMENT & PLAN NOTE
Patient initially presented to [facility] on 8/23 with hypoxia and encephalopathy. Acute change in GCS prompted rapid response and subsequent intubation for airway protection. Of note, pt w/ recent hospitalization to [facility] 8/11-8/17 for encephalopathy with unclear etiology despite extensive workup but Wernicke's encephalopathy was on the differential.    ·  [place] workup 8/11:  · Rapid response and intubation on 8/12 for airway protection, extubated on 8/13  · CT head 8/11: No acute intracranial abnormality. Stable small bilateral subcortical hypoattenuating foci. No associated mass effect. · MRI 8/12: No acute intracranial abnormality. Stable nonspecific enhancement along the anterior floor of the third ventricle/hypothalamus compared to March 2023 study. Findings below:  · Differentials includes chronic toxic metabolic insult (thiamine deficiency), chronic inflammatory/infectious conditions  · Other considerations include Langerhans cell cell histiocytosis, lymphocytic hypophysitis, and neurosyphilis. · Primary CNS lymphoma less likely consideration, given the lack of interval change. Serologic correlation is recommended. Stable cerebral white matter signal abnormality, presumably the sequela of chronic microangiopathic disease given the patient's history  · VEEG 8/12: negative for seizure activity. Possible findings relating to underlying sleep disorder   · LP: Grossly negative overall, pending some results  · Meningitis/encephalitis panel negative, bacterial/fungal culture and gram negative, lyme and cryptococcal negative  · Pending studies: Paraneoplastic and autoimmune CSF studies still pending, can take up to 2 weeks (sent to [facility])  · DSPO: Treated with high dose thiamine. Psych evaluated, possible bipolar disorder.  Discharged to home with his sister with visiting nurses, speech therapy, PT/OT  · ASHLEY Moseley 8/23: Presented with hypoxia found by visiting nurse SPO2 in 80's  · 8/24: Patient's girlfriend visited Amber Raza and found him unresponsive. Notes said that 'eyes were rolled back with secretions around his mouth"  · Rapid response called and intubated for hypoxia and low GCS. VBG ? Post intubation 7.25/76/59/33  · Transferred to [facility] on 8/24  · [facility] 8/24 (Current hospital course)  · CT head 8/24: No acute intracranial abnormality  · 8/25 EEG: No evidence of electrographic seizures. Mild background slowing suggestive of underlying cerebral dysfunction from toxic/metablic/infectious/hypoxic encephalopathy. Clinical correlation is recommended  · On neurological exam: wakes to voice, weakly follows one step commands with all extremities     Plan:  · Encephalopathy likely multifactorial in the setting of abnormal (however stable) MRI showing possible wernicke's encephalopathy, hypercarbia, hyponatremia  · Recommend BiPAP vs CPAP HS while inpatient once extubated. Patient would benefit from outpatient PFTs and possible CPAP HS  · Continue thiamine  · Follow up previous LP send out studies, low utility for repeat LP  · Neurology consulted, recommendations as follows:  · Patient has been somnolent for some time, noted to have some neuro symptoms after chemo in march. · Parneoplastic panel pending, can take up to 2 weeks.  Added CSF VDRL and serum, anti-TPO and DARIN Ab's  · Wait on paraneoplastic panel before considering IVIG/steroids as suspicion is not very high  · Hold off on repeat LP for now per Neuro, supportive care  · Limit sedating meds, currently off sedation   · CAM ICU  · Sleep hygiene  · Neurochecks per routine

## 2023-08-29 NOTE — ASSESSMENT & PLAN NOTE
Improving  Sepsis vs SIRS present on arrival to [facility] by leukocytosis, temp 96.4 with worsening hypoxia requiring intubation. Suspected source left lower lobe infiltrate on CXR  · Thick, yellow/white secretions suctioned via ETT on arrival  · CXR with concern for left lower lobe infiltrate. Patient does have aspiration risk factors with encephalopathy  · Strep pneumo/legionella urine antigens negative  · Procal 0.37 > 0.58 (8/25)  · Tracheal aspirate/sputum culture, pending  · Blood cultures negative  · 8/23: NG @ 4 days x 2  · 8/24: NG @ 72H x 2)  · COVID/flu/RSV negative  · UA unremarkable   · LA 1  · LP on previous admission negative, low utility for repeating LP at this time   · Patient hypotensive on arrival, but likely iatrogenic seconadary to sedation use. Levophed now off. Will defer fluids given profound hypoxia on high ventilator settings, possible component of volume overload on CXR    Plan:  · Antibiotics: Zosyn (vanco D/C'ed 8/26 after negative MRSA swab)  · Continue Zosyn Day 5/7 (Started 8/25-)  · Follow up pending culture data.  V/S stable 8/29  · Trend WBC, fever curve and procal

## 2023-08-29 NOTE — CASE MANAGEMENT
Case Management Assessment & Discharge Planning Note    Patient name aHnnah Hendrix  Location ICU 03/ICU 60 MRN [id number]  : 1985 Date 2023       Current Admission Date: 2023  Current Admission Diagnosis:Encephalopathy   Patient Active Problem List    Diagnosis Date Noted   • Acute respiratory failure with hypoxia ([address]) 2023   • Sepsis ([address]) 2023   • Acute pulmonary embolism without acute cor pulmonale ([address]) 2023   • History of Wernicke's encephalopathy 2023   • Depression 2023   • Encephalopathy 2023   • Constipation 2023   • Wheezing 2023   • Testicular cancer ([address]) 2023   • Hypokalemia 2023   • (HFpEF) heart failure with preserved ejection fraction ([address]) 2023   • Chronic diastolic heart failure ([address]) 2023   • Palpitations 2023   • History of LVH (left ventricular hypertrophy) 2023   • Pure hypertriglyceridemia 2023   • Cerebral gliosis 2023   • Unilateral vestibular schwannoma ([address]) 2023   • Port-A-Cath in place 2023   • Diplopia 2023   • 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   • Urinary hesitancy    • Umbilical hernia without obstruction and without gangrene 12/10/2022   • Tobacco use 12/10/2022   • Retroperitoneal lymphadenopathy 12/10/2022   • Hypertension 12/10/2022      LOS (days): 5  Geometric Mean LOS (GMLOS) (days):   Days to GMLOS:     OBJECTIVE:  PATIENT READMITTED TO HOSPITAL  Risk of Unplanned Readmission Score: 31.61    Current admission status: Inpatient       Preferred Pharmacy:   [address], [address] [place] [address]  Phone: [phone] Fax: [phone]    Primary Care Provider: Jalyn Griffin MD    Primary Insurance: [facility]  Secondary Insurance:     ASSESSMENT:  Active Health Care Proxies    There are no active Health Care Proxies on file. Readmission Root Cause  30 Day Readmission: Yes  Who directed you to return to the hospital?: Family  Did you understand whom to contact if you had questions or problems?: Yes  Did you get your prescriptions before you left the hospital?: Yes  Were you able to get your prescriptions filled when you left the hospital?: Yes  Did you take your medications as prescribed?: Yes  Were you able to get to your follow-up appointments?: Yes  Patient was readmitted due to: SOB    Activities of Daily Living Prior to Admission  Completes ADLs independently?: Yes  Ambulates independently?: Yes  Does patient use assisted devices?: No  Does patient currently own DME?: No  Does patient have a history of Outpatient Therapy (PT/OT)?: No  Does the patient have a history of Short-Term Rehab?: No  Does patient have a history of HHC?: Yes (History of [facility] for PT, OT, speech and nursing.)  Does patient currently have [address]?: No    Patient Information Continued  Income Source: Unemployed  Does patient have prescription coverage?: Yes  Within the past 12 months, you worried that your food would run out before you got the money to buy more.: Never true  Within the past 12 months, the food you bought just didn't last and you didn't have money to get more.: Never true  Food insecurity resource given?: N/A  Does patient receive dialysis treatments?: No  Does patient have a history of Mental Health Diagnosis?: Yes (Dx of Bipolar per patient's chart.)  Is patient receiving treatment for mental health?: Yes         Means of Transportation  Means of Transport to Appts[de-identified] Drives Self  In the past 12 months, has lack of transportation kept you from medical appointments or from getting medications?: No  In the past 12 months, has lack of transportation kept you from meetings, work, or from getting things needed for daily living?: No  Was application for public transport provided?: N/A        DISCHARGE DETAILS:      CM called and left message for patient's mother Kezia Keys to introduce role and complete assessment. CM to follow up again tomorrow. Patient's needs TBD at this time, CM will continue to follow.

## 2023-08-29 NOTE — ASSESSMENT & PLAN NOTE
Hx of hypertension.  PTA was on Torsemide 20 mg daily  · Echo this admission showed EF 60%, normal LV thickness, no systolic or diastolic dysfunction  · Hypotension s/p intubation initially requiring Levophed DC'ed on 8/27  · 8/29: BP's stable MAP 's

## 2023-08-29 NOTE — PROGRESS NOTES
[id number] [facility]  Progress Note  Name: Francesca Caceres  MRN: [id number]  Unit/Bed#: ICU 03 I Date of Admission: 8/24/2023   Date of Service: 8/29/2023 I Hospital Day: 5    Assessment/Plan   Acute respiratory failure with hypoxia [facility])  Assessment & Plan  Patient presented with hypoxia, found to have right lower lobe segmental PE. Initially saturating well on nasal cannula. Rapid response was called 8/24 secondary to hypoxia and altered mental status requiring emergent intubation. Hypoxic post intubation requiring high ventilator settings. · CTA PE study 8/23: Pulmonary embolism in the right lower lobe segmental pulmonary artery. Measured RV/LV ratio is within normal limits at less than 0.9.  · Initial CXR post intubation: Near complete opacity of the left lung likely related to worsening infiltrate and/or atelectasis. Patchy right upper lobe infiltrates. · Strep pneumo/legionella urine antigen negative  · COVID/Flu/RSV negative  · 8/25: Bronchoscopy: poorly tolerated secondary to hypoxia. Some mucus plugging present on the left. · 8/26: CT chest: Complete right lower lobe and near complete left lower lobe atelectasis. Pneumonia not excluded in the appropriate clinical setting. Mild interstitial edema with trace effusions  · 8/29: Net I/O +300ml, Wt -10 lbs improved from 8/28. Labs stable. No additional diuresis at this time. · 8/27 ABG 7.41/48.4/113.4/30.3    Plan:  · 8/29 Current vent settings:  · Tolerated Pressure support on 15/6 overnight  · Early this AM, pt noted to have secretions and hypoxic to 80%, switched to [address] 14/500/40/6  · Wean Fio2 for goal Sp02 >90  · Patient noted to be weak this morning, but attempt to wean again when possible.  Consider possible muscular etiology such as myasthenia  · Empiric antibiotics with Zosyn (Vanco D/C'ed with negative MRSA surveillance) for possible pneumonia  · Antibiotic: Zosyn Day 5/7 (Started 8/25)  · Hypertonic saline nebs given thick secretions  · Chest PT, Pulmonary hygiene  · VAP ppx    Encephalopathy  Assessment & Plan  Patient initially presented to [facility] with hypoxia and encephalopathy. Acute change in GCS prompted rapid response and subsequent intubation for airway protection. Of note, pt w/ recent hospitalization to [facility] 8/11-8/17 for encephalopathy with unclear etiology despite extensive workup but Wernicke's encephalopathy was on the differential.    ·  [place] workup 8/11:  · Rapid response and intubation on 8/12 for airway protection, extubated on 8/13  · CT head 8/11: No acute intracranial abnormality. Stable small bilateral subcortical hypoattenuating foci. No associated mass effect. · MRI 8/12: No acute intracranial abnormality. Stable nonspecific enhancement along the anterior floor of the third ventricle/hypothalamus compared to March 2023 study. Findings below:  · Differentials includes chronic toxic metabolic insult (thiamine deficiency), chronic inflammatory/infectious conditions  · Other considerations include Langerhans cell cell histiocytosis, lymphocytic hypophysitis, and neurosyphilis. · Primary CNS lymphoma less likely consideration, given the lack of interval change. Serologic correlation is recommended. Stable cerebral white matter signal abnormality, presumably the sequela of chronic microangiopathic disease given the patient's history  · VEEG 8/12: negative for seizure activity. Possible findings relating to underlying sleep disorder   · LP: Grossly negative overall, pending some results  · Meningitis/encephalitis panel negative, bacterial/fungal culture and gram negative, lyme and cryptococcal negative  · Pending studies: Paraneoplastic and autoimmune CSF studies still pending, can take up to 2 weeks (sent to [facility])  · DSPO: Treated with high dose thiamine. Psych evaluated, possible bipolar disorder.  Discharged to home with his sister with visiting nurses, speech therapy, PT/OT  ·  Pat Smith 8/23: Presented with hypoxia found by visiting nurse SPO2 in 80's  · 8/24: Patient's girlfriend visited Amber Raza and found him unresponsive. Notes said that 'eyes were rolled back with secretions around his mouth"  · Rapid response called and intubated for hypoxia and low GCS. VBG ? Post intubation 7.25/76/59/33  · Transferred to [facility] on 8/24  · [facility] D[facility]a Le[facility] 8/24 (Current hospital course)  · CT head 8/24: No acute intracranial abnormality  · 8/25 EEG: No evidence of electrographic seizures. Mild background slowing suggestive of underlying cerebral dysfunction from toxic/metablic/infectious/hypoxic encephalopathy. Clinical correlation is recommended  · On neurological exam: wakes to voice, weakly follows one step commands with all extremities     Plan:  · Encephalopathy likely multifactorial in the setting of abnormal (however stable) MRI showing possible wernicke's encephalopathy, hypercarbia, hyponatremia  · Recommend BiPAP vs CPAP HS while inpatient once extubated. Patient would benefit from outpatient PFTs and possible CPAP HS  · Continue thiamine  · Follow up previous LP send out studies, low utility for repeat LP  · Neurology consulted, recommendations as follows:  · Patient has been somnolent for some time, noted to have some neuro symptoms after chemo in march. · Parneoplastic panel pending, can take up to 2 weeks. Added CSF VDRL and serum, anti-TPO and DARIN Ab's  · Wait on paraneoplastic panel before considering IVIG/steroids as suspicion is not very high  · Hold off on repeat LP for now per Neuro, supportive care  · Limit sedating meds, currently off sedation   · CAM ICU  · Sleep hygiene  · Neurochecks per routine     Sepsis [facility])  Assessment & Plan  Improving  Sepsis vs SIRS present on arrival to [facility] by leukocytosis, temp 96.4 with worsening hypoxia requiring intubation.  Suspected source left lower lobe infiltrate on CXR  · Thick, yellow/white secretions suctioned via ETT on arrival  · CXR with concern for left lower lobe infiltrate. Patient does have aspiration risk factors with encephalopathy  · Strep pneumo/legionella urine antigens negative  · Procal 0.37 > 0.58 (8/25)  · Tracheal aspirate/sputum culture, pending  · Blood cultures negative  · 8/23: NG @ 4 days x 2  · 8/24: NG @ 72H x 2)  · COVID/flu/RSV negative  · UA unremarkable   · LA 1  · LP on previous admission negative, low utility for repeating LP at this time   · Patient hypotensive on arrival, but likely iatrogenic seconadary to sedation use. Levophed now off. Will defer fluids given profound hypoxia on high ventilator settings, possible component of volume overload on CXR    Plan:  · Antibiotics: Zosyn (vanco D/C'ed 8/26 after negative MRSA swab)  · Continue Zosyn Day 5/7 (Started 8/25-)  · Follow up pending culture data. V/S stable 8/29  · Trend WBC, fever curve and procal     Acute pulmonary embolism without acute cor pulmonale (HCC)  Assessment & Plan  Presented with hypoxia, initially admitted to the floor on supplemental NC  · CTA PE: Pulmonary embolism in the right lower lobe segmental pulmonary artery. Measured RV/LV ratio is within normal limits at less than 0.9. · BNP 9, initial HS troponin 9  · Initiated on heparin gtt, transitioned to therapeutic Lovenox on 8/26  · Developed worsening hypoxia 8/24, rapid response was called and patient was intubated for airway protection and hypoxic  · Now requiring high vent setting  · Echo completed 8/25: Left Ventricle: Left ventricular cavity size is normal. Wall thickness is normal. The left ventricular ejection fraction is 60% by visual estimation. . Wall motion is normal. Diastolic function is normal for age. Right Ventricle: Right ventricular cavity size is normal. Systolic function is normal. Normal tricuspid annular plane systolic excursion (TAPSE) > 1.7 cm.     Plan:  · Heparin converted to therapeutic lovenox on 8/26  · Will need transition to [address] on DC for 3 months  · Dopplers of LE neg for DVT bilaterally    Hypertension  Assessment & Plan  Hx of hypertension. PTA was on Torsemide 20 mg daily  · Echo this admission showed EF 60%, normal LV thickness, no systolic or diastolic dysfunction  · Hypotension s/p intubation initially requiring Levophed DC'ed on 8/27  · 8/29: BP's stable MAP 's    Depression  Assessment & Plan  · Continue prozac  · Evaluated by psychiatry last admission, felt to have depressive mood disorder    History of LVH (left ventricular hypertrophy)  Assessment & Plan  Previous echocardiogram 6/2/23 showed EF 65%, normal diastolic dysfunction, mild left ventricular hypertrophy, mild RVSP elevation and mild TR  · PTA Med: Torsemide 20 mg daily  · Evaluated by Heart Failure team in July 2023. LVH thought to be secondary to hypertension, obesity, hx of meth use  · Recommended sleep study at that time   · Echo this admission shows normal EF of 60%, normal LV thickness, no diastolic dysfunction    Seminoma of left testis [facility])  Assessment & Plan  · Testicular cancer s/p left radial orchiectomy on 12/2022. Repeat CT scan in January 2023 with enlarging lymph node. Received Etoposide and cistoplatin chemotherapy in March, developed numbness/tingling and vision changes, labile affect and right sided weakness and therefore chemotherapy was stopped. MRI demonstrated white matter changes at that time and he was referred to Neurology   · Continue outpatient follow up with Heme/Onc     Umbilical hernia without obstruction and without gangrene  Assessment & Plan  · Umbilical hernia present, easily reducible     Hyponatremia-resolved as of 8/28/2023  Assessment & Plan  Improving  · Sodium 124 on admission, improved to 128 s/p NSS infusion at 50mL/hr  · 8/23: serum osmo 267, urine osmo 335, urine sodium 40 (on Torsemide PTA)  · Off of NSS  · 8/28: Na 137.  Resolved         ICU Core Measures     Vented Patient  VAP Bundle  VAP bundle ordered     A: Assess, Prevent, and Manage Pain · Has pain been assessed? Yes  · Need for changes to pain regimen? No   B: Both Spontaneous Awakening Trials (SATs) and Spontaneous Breathing Trials (SBTs) · Plan to perform spontaneous awakening trial today? Yes   · Plan to perform spontaneous breathing trial today? Yes   · Obvious barriers to extubation? Yes   C: Choice of Sedation · RASS Goal: 0 Alert and Calm  · Need for changes to sedation or analgesia regimen? No   D: Delirium · CAM-ICU: Unable to perform secondary to Acute cognitive dysfunction   E: Early Mobility  · Plan for early mobility? No   F: Family Engagement · Plan for family engagement today? Yes       Antibiotic Review: Awaiting culture results. Review of Invasive Devices:      Prophylaxis:  VTE VTE covered by:  enoxaparin, Subcutaneous, 135 mg at 08/28/23 2011       Stress Ulcer  covered byomeprazole (PRILOSEC) suspension 2 mg/mL [[id number]]       Subjective   HPI/24hr events: Patient tolerated Pressure support all night on 15/6. Secretions and hypoxia noted this morning to 80's. Switched to AC/VC. Motor strength overnight improved per report, holding extremities up. I/O's improved net fluid balance +300 compared to yesterday +2.2L. Weight down 10 lbs. V/S stable. Awake on this morning's examination, in no acute distress.      Review of Systems   Unable to perform ROS: Intubated        Objective                            Vitals I/O      Most Recent Min/Max in 24hrs   Temp 98.4 °F (36.9 °C) Temp  Min: 97.3 °F (36.3 °C)  Max: 99.9 °F (37.7 °C)   Pulse 83 Pulse  Min: 65  Max: 95   Resp (!) 24 Resp  Min: 12  Max: 25   /74 BP  Min: 99/63  Max: 134/77   O2 Sat 93 % SpO2  Min: 92 %  Max: 100 %      Intake/Output Summary (Last 24 hours) at 8/29/2023 2889  Last data filed at 8/29/2023 0600  Gross per 24 hour   Intake 3110 ml   Output 2775 ml   Net 335 ml         Diet Enteral/Parenteral; Tube Feeding No Oral Diet; Jevity 1.2 Scott; Continuous; 70; Prosource Protein Liquid - One Packet Daily; 140; Water; Every 4 hours     Invasive Monitoring Physical exam    Physical Exam  Eyes:      Extraocular Movements: Extraocular movements intact. Conjunctiva/sclera: Conjunctivae normal.   Skin:     General: Skin is warm and dry. Cardiovascular:      Rate and Rhythm: Normal rate and regular rhythm. Comments: Distant  Musculoskeletal:      Right lower leg: No edema. Left lower leg: No edema. Abdominal:      Palpations: Abdomen is soft. Hernia: A hernia is present. Constitutional:       General: He is not in acute distress. Appearance: He is not toxic-appearing. Interventions: He is intubated. Pulmonary:      Effort: He is intubated. Breath sounds: Rhonchi present. No wheezing. Secretions are abnormal .Neurological:      Mental Status: He is alert. He is calm. Comments: Moving all extremities but very weak   Vitals reviewed. Diagnostic Studies        Imaging: I have personally reviewed pertinent reports.        Medications:  Scheduled PRN   chlorhexidine, 15 mL, Q12H ISAEL  enoxaparin, 1 mg/kg, Q12H ISAEL  FLUoxetine, 10 mg, Daily  omeprazole (PRILOSEC) suspension 2 mg/mL, 20 mg, Daily  piperacillin-tazobactam, 3.375 g, Q6H  potassium chloride, 20 mEq, Once  sodium chloride, 4 mL, Q6H  thiamine, 100 mg, Daily      acetaminophen, 650 mg, Q4H PRN  fentanyl citrate (PF), 50 mcg, Q1H PRN  polyethylene glycol, 17 g, Daily PRN  senna, 17.6 mg, Daily PRN       Continuous          Labs:    CBC    Recent Labs     08/29/23  0540   WBC 8.64   HGB 11.5*   HCT 36.5        BMP    Recent Labs     08/28/23  1525 08/29/23  0540   SODIUM 137 138   K 3.9 3.9   CL 96 97   CO2 33* 34*   AGAP 8 7   BUN 8 9   CREATININE 0.79 0.86   CALCIUM 9.1 9.1       Coags    No recent results     Additional Electrolytes  Recent Labs     08/28/23  1525 08/29/23  0540   MG 2.1 2.3   PHOS 3.6 4.1          Blood Gas    No recent results  No recent results LFTs  No recent results    Infectious  No recent results Glucose  Recent Labs     08/28/23  1525 08/29/23  0540   GLUC 109 91               Critical Care Time Delivered: Upon my evaluation, this patient had a high probability of imminent or life-threatening deterioration due to acute respiratory failure with hypoxia, which required my direct attention, intervention, and personal management. I have personally provided 35 minutes of critical care time, exclusive of procedures, teaching, family meetings, and any prior time recorded by providers other than myself.      Muna Diaz MD

## 2023-08-29 NOTE — ASSESSMENT & PLAN NOTE
Patient presented with hypoxia, found to have right lower lobe segmental PE. Initially saturating well on nasal cannula. Rapid response was called 8/24 secondary to hypoxia and altered mental status requiring emergent intubation. Hypoxic post intubation requiring high ventilator settings. · CTA PE study 8/23: Pulmonary embolism in the right lower lobe segmental pulmonary artery. Measured RV/LV ratio is within normal limits at less than 0.9.  · Initial CXR post intubation: Near complete opacity of the left lung likely related to worsening infiltrate and/or atelectasis. Patchy right upper lobe infiltrates. · Strep pneumo/legionella urine antigen negative  · COVID/Flu/RSV negative  · 8/25: Bronchoscopy: poorly tolerated secondary to hypoxia. Some mucus plugging present on the left. · 8/26: CT chest: Complete right lower lobe and near complete left lower lobe atelectasis. Pneumonia not excluded in the appropriate clinical setting. Mild interstitial edema with trace effusions  · 8/29: Net I/O +300ml, Wt -10 lbs improved from 8/28. Labs stable. No additional diuresis at this time. · 8/27 ABG 7.41/48.4/113.4/30.3    Plan:  · 8/29 Current vent settings:  · Tolerated Pressure support on 15/6 overnight  · Early this AM, pt noted to have secretions and hypoxic to 80%, switched to [address] 14/500/40/6  · Wean Fio2 for goal Sp02 >90  · Patient noted to be weak this morning, but attempt to wean again when possible.  Consider possible muscular etiology such as myasthenia  · Empiric antibiotics with Zosyn (Vanco D/C'ed with negative MRSA surveillance) for possible pneumonia  · Antibiotic: Zosyn Day 5/7 (Started 8/25)  · Hypertonic saline nebs given thick secretions  · Chest PT, Pulmonary hygiene  · VAP ppx

## 2023-08-29 NOTE — ASSESSMENT & PLAN NOTE
Previous echocardiogram 6/2/23 showed EF 76%, normal diastolic dysfunction, mild left ventricular hypertrophy, mild RVSP elevation and mild TR  · PTA Med: Torsemide 20 mg daily  · Evaluated by Heart Failure team in July 2023.  LVH thought to be secondary to hypertension, obesity, hx of meth use  · Recommended sleep study at that time   · Echo this admission shows normal EF of 60%, normal LV thickness, no diastolic dysfunction

## 2023-08-30 ENCOUNTER — APPOINTMENT (OUTPATIENT)
Dept: RADIOLOGY | Facility: HOSPITAL | Age: 38
DRG: 005 | End: 2023-08-30
Payer: COMMERCIAL

## 2023-08-30 PROBLEM — Z72.0 TOBACCO USE: Chronic | Status: ACTIVE | Noted: 2022-12-10

## 2023-08-30 PROBLEM — G93.89 CEREBRAL GLIOSIS: Chronic | Status: ACTIVE | Noted: 2023-04-20

## 2023-08-30 PROBLEM — I50.30 (HFPEF) HEART FAILURE WITH PRESERVED EJECTION FRACTION (HCC): Chronic | Status: ACTIVE | Noted: 2023-07-28

## 2023-08-30 PROBLEM — A41.9 SEPSIS (HCC): Status: RESOLVED | Noted: 2023-08-24 | Resolved: 2023-08-30

## 2023-08-30 PROBLEM — Z86.39 HISTORY OF WERNICKE'S ENCEPHALOPATHY: Chronic | Status: ACTIVE | Noted: 2023-08-23

## 2023-08-30 PROBLEM — E78.1 PURE HYPERTRIGLYCERIDEMIA: Chronic | Status: ACTIVE | Noted: 2023-07-19

## 2023-08-30 PROBLEM — C62.90 TESTICULAR CANCER (HCC): Chronic | Status: ACTIVE | Noted: 2023-07-29

## 2023-08-30 PROBLEM — I51.7 LVH (LEFT VENTRICULAR HYPERTROPHY): Chronic | Status: ACTIVE | Noted: 2023-07-19

## 2023-08-30 PROBLEM — E87.6 HYPOKALEMIA: Status: RESOLVED | Noted: 2023-07-28 | Resolved: 2023-08-30

## 2023-08-30 PROBLEM — K59.00 CONSTIPATION: Status: RESOLVED | Noted: 2023-08-05 | Resolved: 2023-08-30

## 2023-08-30 PROBLEM — K42.9 UMBILICAL HERNIA WITHOUT OBSTRUCTION AND WITHOUT GANGRENE: Chronic | Status: ACTIVE | Noted: 2022-12-10

## 2023-08-30 PROBLEM — G92.8 TOXIC METABOLIC ENCEPHALOPATHY: Status: ACTIVE | Noted: 2023-08-12

## 2023-08-30 PROBLEM — C62.92 SEMINOMA OF LEFT TESTIS (HCC): Chronic | Status: ACTIVE | Noted: 2023-01-03

## 2023-08-30 PROBLEM — R00.2 PALPITATIONS: Status: RESOLVED | Noted: 2023-07-19 | Resolved: 2023-08-30

## 2023-08-30 LAB
ALDOST SERPL-MCNC: 10.8 NG/DL (ref 0–30)
ALDOST/RENIN PLAS-RTO: 1.2 {RATIO} (ref 0–30)
ANION GAP SERPL CALCULATED.3IONS-SCNC: 8 MMOL/L
BASOPHILS # BLD AUTO: 0.04 THOUSANDS/ÂΜL (ref 0–0.1)
BASOPHILS NFR BLD AUTO: 1 % (ref 0–1)
BUN SERPL-MCNC: 11 MG/DL (ref 5–25)
CALCIUM SERPL-MCNC: 9.3 MG/DL (ref 8.4–10.2)
CHLORIDE SERPL-SCNC: 97 MMOL/L (ref 96–108)
CO2 SERPL-SCNC: 33 MMOL/L (ref 21–32)
CREAT SERPL-MCNC: 0.88 MG/DL (ref 0.6–1.3)
EOSINOPHIL # BLD AUTO: 0.1 THOUSAND/ÂΜL (ref 0–0.61)
EOSINOPHIL NFR BLD AUTO: 1 % (ref 0–6)
ERYTHROCYTE [DISTWIDTH] IN BLOOD BY AUTOMATED COUNT: 15.1 % (ref 11.6–15.1)
GAD65 AB SER-ACNC: <5 U/ML (ref 0–5)
GFR SERPL CREATININE-BSD FRML MDRD: 110 ML/MIN/1.73SQ M
GLUCOSE SERPL-MCNC: 122 MG/DL (ref 65–140)
HCT VFR BLD AUTO: 37 % (ref 36.5–49.3)
HGB BLD-MCNC: 12 G/DL (ref 12–17)
IMM GRANULOCYTES # BLD AUTO: 0.06 THOUSAND/UL (ref 0–0.2)
IMM GRANULOCYTES NFR BLD AUTO: 1 % (ref 0–2)
LYMPHOCYTES # BLD AUTO: 1.1 THOUSANDS/ÂΜL (ref 0.6–4.47)
LYMPHOCYTES NFR BLD AUTO: 13 % (ref 14–44)
MAGNESIUM SERPL-MCNC: 2.3 MG/DL (ref 1.9–2.7)
MCH RBC QN AUTO: 31.7 PG (ref 26.8–34.3)
MCHC RBC AUTO-ENTMCNC: 32.4 G/DL (ref 31.4–37.4)
MCV RBC AUTO: 98 FL (ref 82–98)
MISCELLANEOUS LAB TEST RESULT: NORMAL
MONOCYTES # BLD AUTO: 0.65 THOUSAND/ÂΜL (ref 0.17–1.22)
MONOCYTES NFR BLD AUTO: 7 % (ref 4–12)
NEUTROPHILS # BLD AUTO: 6.8 THOUSANDS/ÂΜL (ref 1.85–7.62)
NEUTS SEG NFR BLD AUTO: 77 % (ref 43–75)
NRBC BLD AUTO-RTO: 0 /100 WBCS
PHOSPHATE SERPL-MCNC: 4.1 MG/DL (ref 2.7–4.5)
PLATELET # BLD AUTO: 244 THOUSANDS/UL (ref 149–390)
PMV BLD AUTO: 9.2 FL (ref 8.9–12.7)
POTASSIUM SERPL-SCNC: 3.6 MMOL/L (ref 3.5–5.3)
RBC # BLD AUTO: 3.78 MILLION/UL (ref 3.88–5.62)
RENIN PLAS-CCNC: 9.37 NG/ML/HR (ref 0.17–5.38)
SODIUM SERPL-SCNC: 138 MMOL/L (ref 135–147)
WBC # BLD AUTO: 8.75 THOUSAND/UL (ref 4.31–10.16)

## 2023-08-30 PROCEDURE — 94150 VITAL CAPACITY TEST: CPT

## 2023-08-30 PROCEDURE — 94003 VENT MGMT INPAT SUBQ DAY: CPT

## 2023-08-30 PROCEDURE — 97163 PT EVAL HIGH COMPLEX 45 MIN: CPT

## 2023-08-30 PROCEDURE — 80048 BASIC METABOLIC PNL TOTAL CA: CPT

## 2023-08-30 PROCEDURE — 97110 THERAPEUTIC EXERCISES: CPT

## 2023-08-30 PROCEDURE — 83735 ASSAY OF MAGNESIUM: CPT

## 2023-08-30 PROCEDURE — 94668 MNPJ CHEST WALL SBSQ: CPT

## 2023-08-30 PROCEDURE — 86255 FLUORESCENT ANTIBODY SCREEN: CPT | Performed by: NURSE PRACTITIONER

## 2023-08-30 PROCEDURE — 94760 N-INVAS EAR/PLS OXIMETRY 1: CPT

## 2023-08-30 PROCEDURE — 94640 AIRWAY INHALATION TREATMENT: CPT

## 2023-08-30 PROCEDURE — 94669 MECHANICAL CHEST WALL OSCILL: CPT

## 2023-08-30 PROCEDURE — 84100 ASSAY OF PHOSPHORUS: CPT

## 2023-08-30 PROCEDURE — 85025 COMPLETE CBC W/AUTO DIFF WBC: CPT

## 2023-08-30 PROCEDURE — 99291 CRITICAL CARE FIRST HOUR: CPT | Performed by: INTERNAL MEDICINE

## 2023-08-30 PROCEDURE — 83519 RIA NONANTIBODY: CPT | Performed by: NURSE PRACTITIONER

## 2023-08-30 PROCEDURE — 71045 X-RAY EXAM CHEST 1 VIEW: CPT

## 2023-08-30 RX ORDER — POTASSIUM CHLORIDE 14.9 MG/ML
20 INJECTION INTRAVENOUS ONCE
Status: COMPLETED | OUTPATIENT
Start: 2023-08-30 | End: 2023-08-30

## 2023-08-30 RX ORDER — FUROSEMIDE 10 MG/ML
40 INJECTION INTRAMUSCULAR; INTRAVENOUS ONCE
Status: COMPLETED | OUTPATIENT
Start: 2023-08-30 | End: 2023-08-30

## 2023-08-30 RX ADMIN — PIPERACILLIN AND TAZOBACTAM 3.38 G: 36; 4.5 INJECTION, POWDER, FOR SOLUTION INTRAVENOUS at 10:50

## 2023-08-30 RX ADMIN — ALBUTEROL SULFATE 2.5 MG: 2.5 SOLUTION RESPIRATORY (INHALATION) at 02:31

## 2023-08-30 RX ADMIN — THIAMINE HCL TAB 100 MG 100 MG: 100 TAB at 08:00

## 2023-08-30 RX ADMIN — PIPERACILLIN AND TAZOBACTAM 3.38 G: 36; 4.5 INJECTION, POWDER, FOR SOLUTION INTRAVENOUS at 04:47

## 2023-08-30 RX ADMIN — Medication 20 MG: at 08:01

## 2023-08-30 RX ADMIN — POTASSIUM CHLORIDE 20 MEQ: 14.9 INJECTION, SOLUTION INTRAVENOUS at 09:25

## 2023-08-30 RX ADMIN — SODIUM CHLORIDE SOLN NEBU 3% 4 ML: 3 NEBU SOLN at 20:03

## 2023-08-30 RX ADMIN — ALBUTEROL SULFATE 2.5 MG: 2.5 SOLUTION RESPIRATORY (INHALATION) at 20:03

## 2023-08-30 RX ADMIN — POTASSIUM CHLORIDE 20 MEQ: 14.9 INJECTION, SOLUTION INTRAVENOUS at 07:33

## 2023-08-30 RX ADMIN — FUROSEMIDE 40 MG: 10 INJECTION, SOLUTION INTRAMUSCULAR; INTRAVENOUS at 10:50

## 2023-08-30 RX ADMIN — PIPERACILLIN AND TAZOBACTAM 3.38 G: 36; 4.5 INJECTION, POWDER, FOR SOLUTION INTRAVENOUS at 22:06

## 2023-08-30 RX ADMIN — SODIUM CHLORIDE SOLN NEBU 3% 4 ML: 3 NEBU SOLN at 07:30

## 2023-08-30 RX ADMIN — ENOXAPARIN SODIUM 135 MG: 150 INJECTION SUBCUTANEOUS at 08:01

## 2023-08-30 RX ADMIN — ALBUTEROL SULFATE 2.5 MG: 2.5 SOLUTION RESPIRATORY (INHALATION) at 07:17

## 2023-08-30 RX ADMIN — SODIUM CHLORIDE SOLN NEBU 3% 4 ML: 3 NEBU SOLN at 13:23

## 2023-08-30 RX ADMIN — SODIUM CHLORIDE SOLN NEBU 3% 4 ML: 3 NEBU SOLN at 02:31

## 2023-08-30 RX ADMIN — ALBUTEROL SULFATE 2.5 MG: 2.5 SOLUTION RESPIRATORY (INHALATION) at 13:05

## 2023-08-30 RX ADMIN — PIPERACILLIN AND TAZOBACTAM 3.38 G: 36; 4.5 INJECTION, POWDER, FOR SOLUTION INTRAVENOUS at 16:44

## 2023-08-30 RX ADMIN — CHLORHEXIDINE GLUCONATE 15 ML: 1.2 RINSE ORAL at 08:00

## 2023-08-30 RX ADMIN — ENOXAPARIN SODIUM 135 MG: 150 INJECTION SUBCUTANEOUS at 20:34

## 2023-08-30 RX ADMIN — CHLORHEXIDINE GLUCONATE 15 ML: 1.2 RINSE ORAL at 20:34

## 2023-08-30 RX ADMIN — FENTANYL CITRATE 50 MCG: 50 INJECTION, SOLUTION INTRAMUSCULAR; INTRAVENOUS at 22:23

## 2023-08-30 RX ADMIN — FLUOXETINE 10 MG: 10 CAPSULE ORAL at 08:01

## 2023-08-30 NOTE — ASSESSMENT & PLAN NOTE
Patient initially presented to [facility] on 8/23 with hypoxia and encephalopathy. Acute change in GCS prompted rapid response and subsequent intubation for airway protection. Of note, pt w/ recent hospitalization [facility] [place] 8/11-8/17 for encephalopathy with unclear etiology despite extensive workup but Wernicke's encephalopathy was on the differential.    ·  [place] workup 8/11:  · Rapid response and intubation on 8/12 for airway protection, extubated on 8/13  · CT head 8/11: No acute intracranial abnormality. Stable small bilateral subcortical hypoattenuating foci. No associated mass effect. · MRI 8/12: No acute intracranial abnormality. Stable nonspecific enhancement along the anterior floor of the third ventricle/hypothalamus compared to March 2023 study. Findings below:  · Differentials includes chronic toxic metabolic insult (thiamine deficiency), chronic inflammatory/infectious conditions  · Other considerations include Langerhans cell cell histiocytosis, lymphocytic hypophysitis, and neurosyphilis. · Primary CNS lymphoma less likely consideration, given the lack of interval change. Serologic correlation is recommended. Stable cerebral white matter signal abnormality, presumably the sequela of chronic microangiopathic disease given the patient's history  · VEEG 8/12: negative for seizure activity. Possible findings relating to underlying sleep disorder   · LP: Grossly negative overall, pending some results  · Meningitis/encephalitis panel negative, bacterial/fungal culture and gram negative, lyme and cryptococcal negative  · Pending studies: Paraneoplastic and autoimmune CSF studies still pending, can take up to 2 weeks (sent to [facility])  · DSPO: Treated with high dose thiamine. Psych evaluated, possible bipolar disorder.  Discharged to home with his sister with visiting nurses, speech therapy, PT/OT  · [facility] 8/23: Presented with hypoxia found by visiting nurse SPO2 in 80's  · 8/24: Patient's girlfriend visited [facility] and found him unresponsive. Notes said that 'eyes were rolled back with secretions around his mouth"  · Rapid response called and intubated for hypoxia and low GCS. VBG ? Post intubation 7.25/76/59/33  · Transferred to [facility] on 8/24  ·  Naima Witt 8/24 (Current hospital course)  · CT head 8/24: No acute intracranial abnormality  · 8/25 EEG: No evidence of electrographic seizures. Mild background slowing suggestive of underlying cerebral dysfunction from toxic/metablic/infectious/hypoxic encephalopathy. Clinical correlation is recommended  · On neurological exam: wakes to voice, weakly follows one step commands with all extremities     Plan:  · Encephalopathy likely multifactorial in the setting of abnormal (however stable) MRI showing possible wernicke's encephalopathy, hypercarbia, hyponatremia  · Recommend BiPAP vs CPAP HS while inpatient once extubated. Patient would benefit from outpatient PFTs and possible CPAP HS  · Continue thiamine  · Follow up previous LP send out studies, low utility for repeat LP  · Neurology consulted, recommendations as follows:  · Patient has been somnolent for some time, noted to have some neuro symptoms after chemo in march. · Parneoplastic panel pending, can take up to 2 weeks.  Added CSF VDRL and serum, anti-TPO and DARIN Ab's  · Wait on paraneoplastic panel before considering IVIG/steroids as suspicion is not very high  · Hold off on repeat LP for now per Neuro, supportive care  · Currently off sedation   · CAM ICU  · Sleep hygiene  · Neurochecks per routine

## 2023-08-30 NOTE — PHYSICAL THERAPY NOTE
PHYSICAL THERAPY EVALUATION/TREATMENT     08/30/23 1205   PT Last Visit   PT Visit Date 08/30/23   Note Type   Note type Evaluation   Pain Assessment   Pain Assessment Tool Choi-Baker FACES   Choi-Baker FACES Pain Rating 0   Restrictions/Precautions   Other Precautions Chair Alarm; Bed Alarm;Multiple lines;O2;Fall Risk  (Seen in ICU, intubated)   Home Living   Type of [address] [place] [address] Two level;Stairs to enter with rails  (78 stairs to enter)   Additional Comments Patient unable to verbalize at this time due to intubation, information obtained from past medical records. Prior Function   Level of [place] Needs assistance with IADLS;Needs assistance with ADLs; Needs assistance with functional mobility   Lives With Significant other   Receives Help From Family;Home health  (VNA)   Comments As per documentation patient has been requiring assist at home with extensive medical history including blindness, PE, TBI   General   Additional Pertinent History Chart reviewed, patient admitted with respiratory dysfunction.   Now seen in ICU intubated and able to follow commands   Family/Caregiver Present No   Cognition   Overall Cognitive Status Unable to assess   Arousal/Participation Cooperative   Orientation Level Oriented to person   Following Commands Follows multistep commands with increased time or repetition   Subjective   Subjective Nonverbal with intubation   RUE Assessment   RUE Assessment WFL  (Strength 4 -/5)   LUE Assessment   LUE Assessment WFL  (Strength 3+/)   RLE Assessment   RLE Assessment   (Range of motion within functional limits, strength 3+/5)   LLE Assessment   LLE Assessment   (Range of motion within functional limits, strength 3+/5)   Coordination   Movements are Fluid and Coordinated 0   Coordination and Movement Description Decreased coordination and balance   Bed Mobility   Additional Comments Patient seen in ICU with intubation, unable to assess sitting standing and gait activity at this time   Activity Tolerance   Activity Tolerance Patient limited by fatigue;Treatment limited secondary to medical complications (Comment)   Nurse Made Aware Yes   Assessment   Prognosis Good   Problem List Decreased strength;Decreased range of motion;Decreased endurance; Impaired balance;Decreased mobility; Decreased coordination   Assessment Patient seen for Physical Therapy evaluation. Patient admitted with <principal problem not specified>. Comorbidities affecting patient's physical performance include: . Personal factors affecting patient at time of initial evaluation include: inability to ambulate household distances, inability to navigate community distances, inability to navigate level surfaces without external assistance, inability to perform dynamic tasks in community, limited home support, inability to perform physical activity, inability to perform ADLS and inability to perform IADLS . Prior to admission, patient was requiring assist for functional mobility with ?, requiring assist for ADLS, requiring assist for IADLS, living in a multi-level home, ambulating household distance and home with family assist.  Please find objective findings from Physical Therapy assessment regarding body systems outlined above with impairments and limitations including weakness, decreased ROM, impaired balance, decreased endurance, impaired coordination, gait deviations, decreased activity tolerance, decreased functional mobility tolerance, fall risk and SOB upon exertion. The Barthel Index was used as a functional outcome tool presenting with a score of Barthel Index Score: 0 today indicating marked limitations of functional mobility and ADLS.   Patient's clinical presentation is currently unstable/unpredictable as seen in patient's presentation of vital sign response, changing level of pain, varying levels of cognitive performance, increased fall risk, new onset of impairment of functional mobility, decreased endurance and new onset of weakness. Pt would benefit from continued Physical Therapy treatment to address deficits as defined above and maximize level of functional mobility. As demonstrated by objective findings, the assigned level of complexity for this evaluation is high. The patient's -[facility] Basic Mobility Inpatient Short Form Raw Score is 7. A Raw score of less than or equal to 16 suggests the patient may benefit from discharge to post-acute rehabilitation services. Please also refer to the recommendation of the Physical Therapist for safe discharge planning. Goals   Patient Goals None stated patient intubated   STG Expiration Date 09/06/23   Short Term Goal #1 Assess bed mobility transfers and gait activity as medically able   Short Term Goal #2 Bilateral lower extremity strength 3+/4 -   LTG Expiration Date 09/13/23   Long Term Goal #1 Transfers and gait with roller walker with mod assist   Long Term Goal #2 Gait endurance to 50 feet   Plan   Treatment/Interventions ADL retraining;Functional transfer training;LE strengthening/ROM; Therapeutic exercise; Endurance training;Patient/family training;Equipment eval/education; Bed mobility;Gait training; Compensatory technique education   PT Frequency Other (Comment)  (5 times per week)   Recommendation   PT Discharge Recommendation Post acute rehabilitation services   [facility] Basic Mobility Inpatient   Turning in Flat Bed Without Bedrails 2   Lying on Back to Sitting on Edge of Flat Bed Without Bedrails 1   Moving Bed to Chair 1   Standing Up From Chair Using Arms 1   Walk in Room 1   Climb 3-5 Stairs With Railing 1   Basic Mobility Inpatient Raw Score 7   Turning Head Towards Sound 3   Follow Simple Instructions 3   Low Function Basic Mobility Raw Score  13   Low Function Basic Mobility Standardized Score  20.14   Highest Level Of Mobility   JH-HLM Goal 2: Bed activities/Dependent transfer   JH-HLM Achieved 2: Bed activities/Dependent transfer   Barthel Index Feeding 0   Bathing 0   Grooming Score 0   Dressing Score 0   Bladder Score 0   Bowels Score 0   Toilet Use Score 0   Transfers (Bed/Chair) Score 0   Mobility (Level Surface) Score 0   Stairs Score 0   Barthel Index Score 0   Additional Treatment Session   Start Time 1150   End Time 1205   Treatment Assessment S: Patient nonverbal and intubated in ICU O: All 4 extremity exercise completed as listed below A: Patient will benefit from progression of physical therapy as medically able and continued increasing functional mobility with clinical staff as well. Patient will require postacute rehab services   Exercises   Hamstring Stretch Supine;5 reps;PROM; Bilateral   Heelslides Supine;10 reps;Bilateral   Hip Flexion Supine;10 reps;Bilateral   Hip Abduction Supine;10 reps;Bilateral   Knee AROM Short Arc Quad Supine;10 reps;Bilateral   Ankle Pumps Supine;10 reps;Bilateral   Heel Cord Stretch Supine;5 reps;PROM; Bilateral   Marching Supine;10 reps;Bilateral   Licensure   [place] License Number  [place] Jumbo, PT 4 [id number]

## 2023-08-30 NOTE — ASSESSMENT & PLAN NOTE
Hx of hypertension.  PTA was on Torsemide 20 mg daily  · Echo this admission showed EF 60%, normal LV thickness, no systolic or diastolic dysfunction  · Hypotension s/p intubation initially requiring Levophed DC'ed on 8/27  · 8/30: BP's stable MAP 80s-90s

## 2023-08-30 NOTE — ASSESSMENT & PLAN NOTE
· Continue prozac  · Evaluated by psychiatry last admission, felt to have depressive mood disorder  · Possibly contributing to weakness on vent despite being off sedation.  Discussed with significant other at bedside to encourage patient to move extremities more to build strength for extubation

## 2023-08-30 NOTE — PROGRESS NOTES
[id number] [facility]  Progress Note  Name: Russ Arthur  MRN: [id number]  Unit/Bed#: ICU 03 I Date of Admission: 8/24/2023   Date of Service: 8/30/2023 I Hospital Day: 6    Assessment/Plan   Acute respiratory failure with hypoxia [facility])  Assessment & Plan  Patient presented with hypoxia, found to have right lower lobe segmental PE. Initially saturating well on nasal cannula. Rapid response was called 8/24 secondary to hypoxia and altered mental status requiring emergent intubation. Hypoxic post intubation requiring high ventilator settings. · CTA PE study 8/23: Pulmonary embolism in the right lower lobe segmental pulmonary artery. Measured RV/LV ratio is within normal limits at less than 0.9.  · Initial CXR post intubation: Near complete opacity of the left lung likely related to worsening infiltrate and/or atelectasis. Patchy right upper lobe infiltrates. · Strep pneumo/legionella urine antigen negative  · COVID/Flu/RSV negative  · 8/25: Bronchoscopy: poorly tolerated secondary to hypoxia. Some mucus plugging present on the left. · 8/26: CT chest: Complete right lower lobe and near complete left lower lobe atelectasis. Pneumonia not excluded in the appropriate clinical setting. Mild interstitial edema with trace effusions  · Received diuresis with IV Lasix   · 8/27 ABG 7.41/48.4/113.4/30.3  · 8/29: Tolerated pressure support 15/6 however required AC/VC for secretions and hypoxia, likely secondary to plugging vs atelectasis    Plan:  · 8/30 Current vent settings: AC/VC 14/500/40/6. Will trial on Pressure support 12/6, 40%  · Wean Fio2 for goal Sp02 >90  · Patient noted to be weak, but attempt to wean again when possible. Unclear if muscular etiology vs depression contributing to weakness.  Patient encouraged to move extremities during the day when possible to build up strength for extubation  · Empiric antibiotics with Zosyn (Vanco D/C'ed with negative MRSA surveillance) for possible pneumonia  · Antibiotic: Zosyn Day 6/7 (Started 8/25)  · Hypertonic saline nebs given thick secretions  · Chest PT, Pulmonary hygiene  · VAP ppx  · Repeat chest x-ray 8/30    Encephalopathy  Assessment & Plan  Patient initially presented to [address] [place] [address] on 8/23 with hypoxia and encephalopathy. Acute change in GCS prompted rapid response and subsequent intubation for airway protection. Of note, pt w/ recent hospitalization to B 8/11-8/17 for encephalopathy with unclear etiology despite extensive workup but Wernicke's encephalopathy was on the differential.    ·  [place] workup 8/11:  · Rapid response and intubation on 8/12 for airway protection, extubated on 8/13  · CT head 8/11: No acute intracranial abnormality. Stable small bilateral subcortical hypoattenuating foci. No associated mass effect. · MRI 8/12: No acute intracranial abnormality. Stable nonspecific enhancement along the anterior floor of the third ventricle/hypothalamus compared to March 2023 study. Findings below:  · Differentials includes chronic toxic metabolic insult (thiamine deficiency), chronic inflammatory/infectious conditions  · Other considerations include Langerhans cell cell histiocytosis, lymphocytic hypophysitis, and neurosyphilis. · Primary CNS lymphoma less likely consideration, given the lack of interval change. Serologic correlation is recommended. Stable cerebral white matter signal abnormality, presumably the sequela of chronic microangiopathic disease given the patient's history  · VEEG 8/12: negative for seizure activity. Possible findings relating to underlying sleep disorder   · LP: Grossly negative overall, pending some results  · Meningitis/encephalitis panel negative, bacterial/fungal culture and gram negative, lyme and cryptococcal negative  · Pending studies: Paraneoplastic and autoimmune CSF studies still pending, can take up to 2 weeks (sent to [facility])  · DSPO: Treated with high dose thiamine.  Psych evaluated, possible bipolar disorder. Discharged to home with his sister with visiting nurses, speech therapy, PT/OT  · ASHLEY Kriss ([place]) 8/23: Presented with hypoxia found by visiting nurse SPO2 in 80's  · 8/24: Patient's girlfriend visited Amber Raza and found him unresponsive. Notes said that 'eyes were rolled back with secretions around his mouth"  · Rapid response called and intubated for hypoxia and low GCS. VBG ? Post intubation 7.25/76/59/33  · Transferred to [facility] on 8/24  · ASHLEY Herrera [place] 8/24 (Current hospital course)  · CT head 8/24: No acute intracranial abnormality  · 8/25 EEG: No evidence of electrographic seizures. Mild background slowing suggestive of underlying cerebral dysfunction from toxic/metablic/infectious/hypoxic encephalopathy. Clinical correlation is recommended  · On neurological exam: wakes to voice, weakly follows one step commands with all extremities     Plan:  · Encephalopathy likely multifactorial in the setting of abnormal (however stable) MRI showing possible wernicke's encephalopathy, hypercarbia, hyponatremia  · Recommend BiPAP vs CPAP HS while inpatient once extubated. Patient would benefit from outpatient PFTs and possible CPAP HS  · Continue thiamine  · Follow up previous LP send out studies, low utility for repeat LP  · Neurology consulted, recommendations as follows:  · Patient has been somnolent for some time, noted to have some neuro symptoms after chemo in march. · Parneoplastic panel pending, can take up to 2 weeks.  Added CSF VDRL and serum, anti-TPO and DARIN Ab's  · Wait on paraneoplastic panel before considering IVIG/steroids as suspicion is not very high  · Hold off on repeat LP for now per Neuro, supportive care  · Currently off sedation   · CAM ICU  · Sleep hygiene  · Neurochecks per routine     Acute pulmonary embolism without acute cor pulmonale (HCC)  Assessment & Plan  Presented with hypoxia, initially admitted to the floor on supplemental NC  · CTA PE: Pulmonary embolism in the right lower lobe segmental pulmonary artery. Measured RV/LV ratio is within normal limits at less than 0.9. · BNP 9, initial HS troponin 9  · Initiated on heparin gtt, transitioned to therapeutic Lovenox on 8/26  · Developed worsening hypoxia 8/24, rapid response was called and patient was intubated for airway protection and hypoxic  · Now requiring high vent setting  · Echo completed 8/25: Left Ventricle: Left ventricular cavity size is normal. Wall thickness is normal. The left ventricular ejection fraction is 60% by visual estimation. . Wall motion is normal. Diastolic function is normal for age. Right Ventricle: Right ventricular cavity size is normal. Systolic function is normal. Normal tricuspid annular plane systolic excursion (TAPSE) > 1.7 cm. Plan:  · Heparin converted to therapeutic lovenox on 8/26  · Will need transition to [address] on DC for 3 months  · Dopplers of LE neg for DVT bilaterally    Hypertension  Assessment & Plan  Hx of hypertension. PTA was on Torsemide 20 mg daily  · Echo this admission showed EF 60%, normal LV thickness, no systolic or diastolic dysfunction  · Hypotension s/p intubation initially requiring Levophed DC'ed on 8/27  · 8/30: BP's stable MAP 80s-90s    Depression  Assessment & Plan  · Continue prozac  · Evaluated by psychiatry last admission, felt to have depressive mood disorder  · Possibly contributing to weakness on vent despite being off sedation. Discussed with significant other at bedside to encourage patient to move extremities more to build strength for extubation    History of LVH (left ventricular hypertrophy)  Assessment & Plan  Previous echocardiogram 6/2/23 showed EF 11%, normal diastolic dysfunction, mild left ventricular hypertrophy, mild RVSP elevation and mild TR  · PTA Med: Torsemide 20 mg daily  · Evaluated by Heart Failure team in July 2023.  LVH thought to be secondary to hypertension, obesity, hx of meth use  · Recommended sleep study at that time   · Echo this admission shows normal EF of 60%, normal LV thickness, no diastolic dysfunction    Seminoma of left testis [facility])  Assessment & Plan  · Testicular cancer s/p left radial orchiectomy on 12/2022. Repeat CT scan in January 2023 with enlarging lymph node. Received Etoposide and cistoplatin chemotherapy in March, developed numbness/tingling and vision changes, labile affect and right sided weakness and therefore chemotherapy was stopped. MRI demonstrated white matter changes at that time and he was referred to Neurology   · Continue outpatient follow up with Heme/Onc     Umbilical hernia without obstruction and without gangrene  Assessment & Plan  · Umbilical hernia present, easily reducible     Sepsis (HCC)-resolved as of 8/30/2023  Assessment & Plan  Improving  Sepsis vs SIRS present on arrival to [address] [place] [address] by leukocytosis, temp 96.4 with worsening hypoxia requiring intubation. Suspected source left lower lobe infiltrate on CXR  · Thick, yellow/white secretions suctioned via ETT on arrival  · CXR with concern for left lower lobe infiltrate. Patient does have aspiration risk factors with encephalopathy  · Strep pneumo/legionella urine antigens negative  · Procal 0.37 > 0.58 (8/25)  · Tracheal aspirate/sputum culture, pending showing 1+ group C strep  · Blood cultures negative  · 8/23: NG @ 5 days x 2  · 8/24: NG @ 5 days x 2)  · COVID/flu/RSV negative  · UA unremarkable   · LA 1  · LP on previous admission negative, low utility for repeating LP at this time   · Patient hypotensive on arrival, but likely iatrogenic seconadary to sedation use. Levophed now off. Will defer fluids given profound hypoxia on high ventilator settings, possible component of volume overload on CXR    Plan:  · Antibiotics: Zosyn (vanco D/C'ed 8/26 after negative MRSA swab)  · Continue Zosyn Day 6/7 (Started 8/25-)  · Follow up pending culture data.  V/S stable 8/29  · Trend WBC, fever curve and procal              ICU Core Measures     Vented Patient  VAP Bundle  VAP bundle ordered     A: Assess, Prevent, and Manage Pain · Has pain been assessed? Yes  · Need for changes to pain regimen? No   B: Both Spontaneous Awakening Trials (SATs) and Spontaneous Breathing Trials (SBTs) · Plan to perform spontaneous awakening trial today? Yes   · Plan to perform spontaneous breathing trial today? Yes   · Obvious barriers to extubation? Yes Still weak   C: Choice of Sedation · RASS Goal: 0 Alert and Calm  · Need for changes to sedation or analgesia regimen? No   D: Delirium · CAM-ICU: Unable to perform secondary to Acute cognitive dysfunction   E: Early Mobility  · Plan for early mobility? Yes   F: Family Engagement · Plan for family engagement today? Yes       Antibiotic Review: Patient on appropriate coverage based on culture data. Review of Invasive Devices:      Prophylaxis:  VTE VTE covered by:  enoxaparin, Subcutaneous, 135 mg at 08/29/23 2001       Stress Ulcer  covered byomeprazole (PRILOSEC) suspension 2 mg/mL [[id number]]       Subjective   HPI/24hr events:   Review of Systems   Unable to perform ROS: Intubated             Objective                            Vitals I/O      Most Recent Min/Max in 24hrs   Temp (!) 96.8 °F (36 °C) Temp  Min: 95.7 °F (35.4 °C)  Max: 99.1 °F (37.3 °C)   Pulse 79 Pulse  Min: 75  Max: 94   Resp 17 Resp  Min: 14  Max: 21   /78 BP  Min: 103/66  Max: 124/67   O2 Sat 97 % SpO2  Min: 92 %  Max: 98 %      Intake/Output Summary (Last 24 hours) at 8/30/2023 0641  Last data filed at 8/30/2023 0600  Gross per 24 hour   Intake 3165 ml   Output 2175 ml   Net 990 ml         Diet Enteral/Parenteral; Tube Feeding No Oral Diet; Jevity 1.2 Scott; Continuous; 70; Prosource Protein Liquid - One Packet Daily; 140; Water; Every 4 hours     Invasive Monitoring Physical exam    Physical Exam  Eyes:      Extraocular Movements: Extraocular movements intact. Conjunctiva/sclera: Conjunctivae normal.   Skin:     General: Skin is warm and dry. HENT:      Mouth/Throat:      Mouth: Mucous membranes are moist.   Cardiovascular:      Rate and Rhythm: Normal rate and regular rhythm. Comments: Distant heart sounds  Musculoskeletal:      Right lower leg: No edema. Left lower leg: No edema. Abdominal:      General: Bowel sounds are normal.      Palpations: Abdomen is soft. Tenderness: There is no abdominal tenderness. Hernia: A hernia is present. Constitutional:       General: He is not in acute distress. Interventions: He is intubated. Pulmonary:      Effort: No respiratory distress. He is intubated. Breath sounds: No wheezing or rhonchi. Comments: Decreased breath sounds at bases  Neurological:      Mental Status: He is alert. He is calm. Comments: Arousable to voice. Following commands. and Weakness with moving extremities x 4, delayed slow movements. Hand  strength intact and symmetrical   Vitals reviewed. Diagnostic Studies      Imaging: I have personally reviewed pertinent reports.    and I have personally reviewed pertinent films in PACS     Medications:  Scheduled PRN   albuterol, 2.5 mg, Q6H  chlorhexidine, 15 mL, Q12H ISAEL  enoxaparin, 1 mg/kg, Q12H ISAEL  FLUoxetine, 10 mg, Daily  omeprazole (PRILOSEC) suspension 2 mg/mL, 20 mg, Daily  piperacillin-tazobactam, 3.375 g, Q6H  sodium chloride, 4 mL, Q6H  thiamine, 100 mg, Daily      acetaminophen, 650 mg, Q4H PRN  fentanyl citrate (PF), 50 mcg, Q1H PRN  polyethylene glycol, 17 g, Daily PRN  senna, 17.6 mg, Daily PRN       Continuous          Labs:    CBC    Recent Labs     08/29/23  0540 08/30/23  0601   WBC 8.64 8.75   HGB 11.5* 12.0   HCT 36.5 37.0    244     BMP    Recent Labs     08/29/23  0540 08/30/23  0601   SODIUM 138 138   K 3.9 3.6   CL 97 97   CO2 34* 33*   AGAP 7 8   BUN 9 11   CREATININE 0.86 0.88   CALCIUM 9.1 9.3       Coags    No recent results     Additional Electrolytes  Recent Labs     08/29/23  0540 08/30/23  0601   MG 2.3 2.3   PHOS 4.1 4.1          Blood Gas    No recent results  No recent results LFTs  No recent results    Infectious  No recent results  Glucose  Recent Labs     08/28/23  1525 08/29/23  0540 08/30/23  0601   GLUC 109 91 122                 Kinsey Gottlieb MD

## 2023-08-30 NOTE — ASSESSMENT & PLAN NOTE
Previous echocardiogram 6/2/23 showed EF 72%, normal diastolic dysfunction, mild left ventricular hypertrophy, mild RVSP elevation and mild TR  · PTA Med: Torsemide 20 mg daily  · Evaluated by Heart Failure team in July 2023.  LVH thought to be secondary to hypertension, obesity, hx of meth use  · Recommended sleep study at that time   · Echo this admission shows normal EF of 60%, normal LV thickness, no diastolic dysfunction

## 2023-08-30 NOTE — RESPIRATORY THERAPY NOTE
Received patient on AC/VC 14/500/40% +6 tolerating therapy. Switched him to PS 12/6 40% tolerating therapy. Restraints secure.

## 2023-08-30 NOTE — ASSESSMENT & PLAN NOTE
Patient presented with hypoxia, found to have right lower lobe segmental PE. Initially saturating well on nasal cannula. Rapid response was called 8/24 secondary to hypoxia and altered mental status requiring emergent intubation. Hypoxic post intubation requiring high ventilator settings. · CTA PE study 8/23: Pulmonary embolism in the right lower lobe segmental pulmonary artery. Measured RV/LV ratio is within normal limits at less than 0.9.  · Initial CXR post intubation: Near complete opacity of the left lung likely related to worsening infiltrate and/or atelectasis. Patchy right upper lobe infiltrates. · Strep pneumo/legionella urine antigen negative  · COVID/Flu/RSV negative  · 8/25: Bronchoscopy: poorly tolerated secondary to hypoxia. Some mucus plugging present on the left. · 8/26: CT chest: Complete right lower lobe and near complete left lower lobe atelectasis. Pneumonia not excluded in the appropriate clinical setting. Mild interstitial edema with trace effusions  · Received diuresis with IV Lasix   · 8/27 ABG 7.41/48.4/113.4/30.3  · 8/29: Tolerated pressure support 15/6 however required AC/VC for secretions and hypoxia, likely secondary to plugging vs atelectasis    Plan:  · 8/30 Current vent settings: AC/VC 14/500/40/6  · Wean Fio2 for goal Sp02 >90  · Patient noted to be weak this morning, but attempt to wean again when possible. Unclear if muscular etiology vs depression contributing to weakness. Patient encouraged to move extremities during the day when possible to build up strength for extubation.   · Empiric antibiotics with Zosyn (Vanco D/C'ed with negative MRSA surveillance) for possible pneumonia  · Antibiotic: Zosyn Day 6/7 (Started 8/25)  · Hypertonic saline nebs given thick secretions  · Chest PT, Pulmonary hygiene  · VAP ppx  · Repeat chest x-ray 8/30

## 2023-08-30 NOTE — CASE MANAGEMENT
Case Management Progress Note    Patient name Brandon Raza  Location ICU 03/ICU 03 MRN [id number]  : 1985 Date 2023       LOS (days): 6  Geometric Mean LOS (GMLOS) (days):   Days to GMLOS:        OBJECTIVE:        Current admission status: Inpatient  Preferred Pharmacy:   [facility], [address] [address]  Phone: [phone] Fax: [phone]    Primary Care Provider: Inessa Shaw MD    Primary Insurance: [facility]  Secondary Insurance:     PROGRESS NOTE:    CM called and spoke with patient's mother Deepak Seth to introduce role and make her aware CM is following her son during his hospital stay. Deepak Seth had no questions or needs at this time. CM will continue to follow patient.

## 2023-08-30 NOTE — ASSESSMENT & PLAN NOTE
Improving  Sepsis vs SIRS present on arrival to [facility] by leukocytosis, temp 96.4 with worsening hypoxia requiring intubation. Suspected source left lower lobe infiltrate on CXR  · Thick, yellow/white secretions suctioned via ETT on arrival  · CXR with concern for left lower lobe infiltrate. Patient does have aspiration risk factors with encephalopathy  · Strep pneumo/legionella urine antigens negative  · Procal 0.37 > 0.58 (8/25)  · Tracheal aspirate/sputum culture, pending showing 1+ group C strep  · Blood cultures negative  · 8/23: NG @ 5 days x 2  · 8/24: NG @ 5 days x 2)  · COVID/flu/RSV negative  · UA unremarkable   · LA 1  · LP on previous admission negative, low utility for repeating LP at this time   · Patient hypotensive on arrival, but likely iatrogenic seconadary to sedation use. Levophed now off. Will defer fluids given profound hypoxia on high ventilator settings, possible component of volume overload on CXR    Plan:  · Antibiotics: Zosyn (vanco D/C'ed 8/26 after negative MRSA swab)  · Continue Zosyn Day 6/7 (Started 8/25-)  · Follow up pending culture data.  V/S stable 8/29  · Trend WBC, fever curve and procal

## 2023-08-31 LAB
ANION GAP SERPL CALCULATED.3IONS-SCNC: 6 MMOL/L
BUN SERPL-MCNC: 15 MG/DL (ref 5–25)
CALCIUM SERPL-MCNC: 9.3 MG/DL (ref 8.4–10.2)
CHLORIDE SERPL-SCNC: 99 MMOL/L (ref 96–108)
CO2 SERPL-SCNC: 34 MMOL/L (ref 21–32)
CREAT SERPL-MCNC: 0.83 MG/DL (ref 0.6–1.3)
GFR SERPL CREATININE-BSD FRML MDRD: 113 ML/MIN/1.73SQ M
GLUCOSE SERPL-MCNC: 95 MG/DL (ref 65–140)
POTASSIUM SERPL-SCNC: 3.6 MMOL/L (ref 3.5–5.3)
SODIUM SERPL-SCNC: 139 MMOL/L (ref 135–147)

## 2023-08-31 PROCEDURE — 80048 BASIC METABOLIC PNL TOTAL CA: CPT

## 2023-08-31 PROCEDURE — 94668 MNPJ CHEST WALL SBSQ: CPT

## 2023-08-31 PROCEDURE — 94003 VENT MGMT INPAT SUBQ DAY: CPT

## 2023-08-31 PROCEDURE — 94760 N-INVAS EAR/PLS OXIMETRY 1: CPT

## 2023-08-31 PROCEDURE — 94150 VITAL CAPACITY TEST: CPT

## 2023-08-31 PROCEDURE — 99291 CRITICAL CARE FIRST HOUR: CPT | Performed by: INTERNAL MEDICINE

## 2023-08-31 PROCEDURE — 94669 MECHANICAL CHEST WALL OSCILL: CPT

## 2023-08-31 PROCEDURE — 99232 SBSQ HOSP IP/OBS MODERATE 35: CPT | Performed by: PSYCHIATRY & NEUROLOGY

## 2023-08-31 PROCEDURE — 94640 AIRWAY INHALATION TREATMENT: CPT

## 2023-08-31 RX ORDER — POTASSIUM CHLORIDE 14.9 MG/ML
20 INJECTION INTRAVENOUS ONCE
Status: COMPLETED | OUTPATIENT
Start: 2023-08-31 | End: 2023-08-31

## 2023-08-31 RX ADMIN — SODIUM CHLORIDE SOLN NEBU 3% 4 ML: 3 NEBU SOLN at 07:37

## 2023-08-31 RX ADMIN — FENTANYL CITRATE 50 MCG: 50 INJECTION, SOLUTION INTRAMUSCULAR; INTRAVENOUS at 00:18

## 2023-08-31 RX ADMIN — FLUOXETINE 10 MG: 10 CAPSULE ORAL at 08:00

## 2023-08-31 RX ADMIN — CHLORHEXIDINE GLUCONATE 15 ML: 1.2 RINSE ORAL at 08:00

## 2023-08-31 RX ADMIN — ALBUTEROL SULFATE 2.5 MG: 2.5 SOLUTION RESPIRATORY (INHALATION) at 19:49

## 2023-08-31 RX ADMIN — SODIUM CHLORIDE SOLN NEBU 3% 4 ML: 3 NEBU SOLN at 01:04

## 2023-08-31 RX ADMIN — Medication 20 MG: at 08:00

## 2023-08-31 RX ADMIN — ALBUTEROL SULFATE 2.5 MG: 2.5 SOLUTION RESPIRATORY (INHALATION) at 07:37

## 2023-08-31 RX ADMIN — PIPERACILLIN AND TAZOBACTAM 3.38 G: 36; 4.5 INJECTION, POWDER, FOR SOLUTION INTRAVENOUS at 03:40

## 2023-08-31 RX ADMIN — ACETAMINOPHEN 650 MG: 650 SUSPENSION ORAL at 16:15

## 2023-08-31 RX ADMIN — ALBUTEROL SULFATE 2.5 MG: 2.5 SOLUTION RESPIRATORY (INHALATION) at 13:48

## 2023-08-31 RX ADMIN — POTASSIUM CHLORIDE 20 MEQ: 14.9 INJECTION, SOLUTION INTRAVENOUS at 07:48

## 2023-08-31 RX ADMIN — SODIUM CHLORIDE SOLN NEBU 3% 4 ML: 3 NEBU SOLN at 19:48

## 2023-08-31 RX ADMIN — SODIUM CHLORIDE SOLN NEBU 3% 4 ML: 3 NEBU SOLN at 13:48

## 2023-08-31 RX ADMIN — ALBUTEROL SULFATE 2.5 MG: 2.5 SOLUTION RESPIRATORY (INHALATION) at 01:04

## 2023-08-31 RX ADMIN — ENOXAPARIN SODIUM 135 MG: 150 INJECTION SUBCUTANEOUS at 21:26

## 2023-08-31 RX ADMIN — PIPERACILLIN AND TAZOBACTAM 3.38 G: 36; 4.5 INJECTION, POWDER, FOR SOLUTION INTRAVENOUS at 16:39

## 2023-08-31 RX ADMIN — PIPERACILLIN AND TAZOBACTAM 3.38 G: 36; 4.5 INJECTION, POWDER, FOR SOLUTION INTRAVENOUS at 23:04

## 2023-08-31 RX ADMIN — THIAMINE HCL TAB 100 MG 100 MG: 100 TAB at 08:00

## 2023-08-31 RX ADMIN — ENOXAPARIN SODIUM 135 MG: 150 INJECTION SUBCUTANEOUS at 08:00

## 2023-08-31 RX ADMIN — CHLORHEXIDINE GLUCONATE 15 ML: 1.2 RINSE ORAL at 21:27

## 2023-08-31 RX ADMIN — PIPERACILLIN AND TAZOBACTAM 3.38 G: 36; 4.5 INJECTION, POWDER, FOR SOLUTION INTRAVENOUS at 10:33

## 2023-08-31 RX ADMIN — ACETAMINOPHEN 650 MG: 650 SUSPENSION ORAL at 01:23

## 2023-08-31 RX ADMIN — POTASSIUM CHLORIDE 20 MEQ: 14.9 INJECTION, SOLUTION INTRAVENOUS at 09:48

## 2023-08-31 NOTE — ASSESSMENT & PLAN NOTE
Previous echocardiogram 6/2/23 showed EF 20%, normal diastolic dysfunction, mild left ventricular hypertrophy, mild RVSP elevation and mild TR  · PTA Med: Torsemide 20 mg daily  · Evaluated by Heart Failure team in July 2023.  LVH thought to be secondary to hypertension, obesity, hx of meth use  · Recommended sleep study at that time   · Echo this admission shows normal EF of 60%, normal LV thickness, no diastolic dysfunction

## 2023-08-31 NOTE — UTILIZATION REVIEW
Continued Stay Review    Date: 08/31/2023                          Current Patient Class: Inpatient  Current Level of Care: Critical Care    HPI:38 y.o. male initially admitted on 08/24/2023    Assessment/Plan: Acute Respiratory Failure with Hypoxia. Remains intubated & vented (Day 8). PT/OT. If no improvement as patient approaches Day 14 on Vent, will consider above interventions, (Trac & PEG). Wean Fio2 for goal Sp02 >90. Continue IV Abx. Continue Saline Nebulizer. Chest PT. Pulmonary hygiene. Vital Signs: /85   Pulse 90   Temp 99.9 °F (37.7 °C)   Resp (!) 28   Ht 6' 4" (1.93 m)   Wt (!) 138 kg (303 lb 2.1 oz)   SpO2 95%   BMI 36.90 kg/m²     Pertinent Labs/Diagnostic Results:   Results from last 7 days   Lab Units 08/24/23  2138   SARS-COV-2  Negative     Results from last 7 days   Lab Units 08/30/23  0601 08/29/23  0540 08/27/23  0546 08/26/23  0541 08/25/23  0600   WBC Thousand/uL 8.75 8.64 6.04 5.80 9.73   HEMOGLOBIN g/dL 12.0 11.5* 10.3* 10.5* 12.1   HEMATOCRIT % 37.0 36.5 32.2* 31.4* 36.4*   PLATELETS Thousands/uL 244 225 211 206 249   NEUTROS ABS Thousands/µL 6.80  --  4.25  --  7.97*     Results from last 7 days   Lab Units 08/31/23  0535 08/30/23  0601 08/29/23  0540 08/28/23  1525 08/27/23  0546 08/26/23  0541 08/25/23  0600   SODIUM mmol/L 139 138 138 137 133*   < > 131*   POTASSIUM mmol/L 3.6 3.6 3.9 3.9 3.7   < > 3.8   CHLORIDE mmol/L 99 97 97 96 94*   < > 88*   CO2 mmol/L 34* 33* 34* 33* 35*   < > 38*   ANION GAP mmol/L 6 8 7 8 4   < > 5   BUN mg/dL 15 11 9 8 8   < > 8   CREATININE mg/dL 0.83 0.88 0.86 0.79 0.80   < > 0.66   EGFR ml/min/1.73sq m 113 110 111 115 114   < > 124   CALCIUM mg/dL 9.3 9.3 9.1 9.1 8.6   < > 8.8   CALCIUM, IONIZED mmol/L  --   --   --   --   --   --  1.08*   MAGNESIUM mg/dL  --  2.3 2.3 2.1 2.0  --  1.8*   PHOSPHORUS mg/dL  --  4.1 4.1 3.6 3.7  --   --     < > = values in this interval not displayed.      Results from last 7 days   Lab Units 08/26/23  0541 08/25/23  0600   AST U/L 23 33   ALT U/L 55* 74*   ALK PHOS U/L 90 113*   TOTAL PROTEIN g/dL 5.5* 6.0*   ALBUMIN g/dL 3.1* 3.3*   TOTAL BILIRUBIN mg/dL 0.45 0.53     Results from last 7 days   Lab Units 08/31/23  0535 08/30/23  0601 08/29/23  0540 08/28/23  1525 08/27/23  0546 08/26/23  2206 08/26/23  0541 08/25/23  0600 08/24/23 2024   GLUCOSE RANDOM mg/dL 95 122 91 109 102 94 104 107 112      Results from last 7 days   Lab Units 08/27/23  0546 08/26/23  0816 08/26/23  0434   PH ART  7.415 7.424 7.404   PCO2 ART mm Hg 48.4* 53.7* 57.4*   PO2 ART mm Hg 114.6 92.7 107.6   HCO3 ART mmol/L 30.3* 34.4* 35.1*   BASE EXC ART mmol/L 5.0 8.5 8.7   O2 CONTENT ART mL/dL 15.2* 15.4* 15.5*   O2 HGB, ARTERIAL % 97.7* 96.3 97.3*   ABG SOURCE  Line, Arterial Line, Arterial Line, Arterial     Results from last 7 days   Lab Units 08/26/23  0817 08/26/23  0139 08/25/23 2007   PTT seconds 93* 59* 67*     Results from last 7 days   Lab Units 08/25/23  0600   TSH 3RD GENERATON uIU/mL 2.563     Results from last 7 days   Lab Units 08/25/23  0600 08/24/23 2024   PROCALCITONIN ng/ml 0.58* 0.37*     Results from last 7 days   Lab Units 08/24/23 2024   LACTIC ACID mmol/L 1.0     Results from last 7 days   Lab Units 08/24/23 2012   CLARITY UA  Clear   COLOR UA  Yellow   SPEC GRAV UA  >=1.030   PH UA  6.0   GLUCOSE UA mg/dl Negative   KETONES UA mg/dl Negative   BLOOD UA  Negative   PROTEIN UA mg/dl 30 (1+)*   NITRITE UA  Negative   BILIRUBIN UA  Negative   UROBILINOGEN UA E.U./dl 1.0   LEUKOCYTES UA  Negative   WBC UA /hpf 10-20*   RBC UA /hpf 0-1   BACTERIA UA /hpf Occasional   EPITHELIAL CELLS WET PREP /hpf Occasional   MUCUS THREADS  Moderate*     Results from last 7 days   Lab Units 08/24/23  2138 08/24/23 2013   STREP PNEUMONIAE ANTIGEN, URINE   --  Negative   LEGIONELLA URINARY ANTIGEN   --  Negative   INFLUENZA A PCR  Negative  --    INFLUENZA B PCR  Negative  --    RSV PCR  Negative  --      Results from last 7 days   Lab Units 08/24/23 2110 08/24/23 2029 08/24/23 2025 08/24/23 2012   BLOOD CULTURE   --  No Growth After 5 Days. No Growth After 5 Days. --    SPUTUM CULTURE  1 colony Beta Hemolytic Streptococcus Group C*  1+ Growth of  --   --   --    GRAM STAIN RESULT  1+ Epithelial cells per low power field*  3+ Polys*  1+ Gram positive cocci in pairs*  --   --   --    URINE CULTURE   --   --   --  No Growth <1000 cfu/mL     Results from last 7 days   Lab Units 08/25/23  1627   TOTAL COUNTED  100     Results from last 7 days   Lab Units 08/26/23  0541   VANCOMYCIN TR ug/mL 11.1       Medications:   Scheduled Medications:  albuterol, 2.5 mg, Nebulization, Q6H  chlorhexidine, 15 mL, Mouth/Throat, Q12H ISAEL  enoxaparin, 1 mg/kg, Subcutaneous, Q12H ISAEL  FLUoxetine, 10 mg, Oral, Daily  omeprazole (PRILOSEC) suspension 2 mg/mL, 20 mg, Oral, Daily  piperacillin-tazobactam, 3.375 g, Intravenous, Q6H  sodium chloride, 4 mL, Nebulization, Q6H  thiamine, 100 mg, Oral, Daily      Continuous IV Infusions:     PRN Meds:  acetaminophen, 650 mg, Oral, Q4H PRN  fentanyl citrate (PF), 50 mcg, Intravenous, Q1H PRN  polyethylene glycol, 17 g, Oral, Daily PRN  senna, 17.6 mg, Oral, Daily PRN        Discharge Plan: D    Network Utilization Review Department  ATTENTION: Please call with any questions or concerns to [phone] and carefully listen to the prompts so that you are directed to the right person. All voicemails are confidential.  [facility] all requests for admission clinical reviews, approved or denied determinations and any other requests to dedicated fax number below belonging to the campus where the patient is receiving treatment.  List of dedicated fax numbers for the Facilities:  [place] DENIALS (Administrative/Medical Necessity) [phone] [facility] ([facility]Pediatrics) [address] [place] [address] [place] [phone]   [address] [place] [address] [address] [facility] [address] [facility] [address] [address] [facility] [address] [facility] [address] [place] [address] [phone]

## 2023-08-31 NOTE — ASSESSMENT & PLAN NOTE
Patient initially presented to [facility] on 8/23 with hypoxia and encephalopathy. Acute change in GCS prompted rapid response and subsequent intubation for airway protection. Of note, pt w/ recent hospitalization to [facility] 8/11-8/17 for encephalopathy with unclear etiology despite extensive workup but Wernicke's encephalopathy was on the differential.    ·  [place] workup 8/11:  · Rapid response and intubation on 8/12 for airway protection, extubated on 8/13  · CT head 8/11: No acute intracranial abnormality. Stable small bilateral subcortical hypoattenuating foci. No associated mass effect. · MRI 8/12: No acute intracranial abnormality. Stable nonspecific enhancement along the anterior floor of the third ventricle/hypothalamus compared to March 2023 study. Findings below:  · Differentials includes chronic toxic metabolic insult (thiamine deficiency), chronic inflammatory/infectious conditions  · Other considerations include Langerhans cell cell histiocytosis, lymphocytic hypophysitis, and neurosyphilis. · Primary CNS lymphoma less likely consideration, given the lack of interval change. Serologic correlation is recommended. Stable cerebral white matter signal abnormality, presumably the sequela of chronic microangiopathic disease given the patient's history  · VEEG 8/12: negative for seizure activity. Possible findings relating to underlying sleep disorder   · LP: Grossly negative overall, pending some results  · Meningitis/encephalitis panel negative, bacterial/fungal culture and gram negative, lyme and cryptococcal negative  · Pending studies: Paraneoplastic and autoimmune CSF studies still pending, can take up to 2 weeks (sent to [facility])  · DSPO: Treated with high dose thiamine. Psych evaluated, possible bipolar disorder.  Discharged to home with his sister with visiting nurses, speech therapy, PT/OT  ·  Kriss ([place]) 8/23: Presented with hypoxia found by visiting nurse SPO2 in 80's  · 8/24: Patient's girlfriend visited [place] and found him unresponsive. Notes said that 'eyes were rolled back with secretions around his mouth"  · Rapid response called and intubated for hypoxia and low GCS. VBG ? Post intubation 7.25/76/59/33  · Transferred to [facility] on 8/24  · ASHLEY CONKLIN [facility] 8/24 (Current hospital course)  · CT head 8/24: No acute intracranial abnormality  · 8/25 EEG: No evidence of electrographic seizures. Mild background slowing suggestive of underlying cerebral dysfunction from toxic/metablic/infectious/hypoxic encephalopathy. Clinical correlation is recommended  · On neurological exam: wakes to voice, weakly follows one step commands with all extremities     Plan:  · Encephalopathy likely multifactorial in the setting of abnormal (however stable) MRI showing possible wernicke's encephalopathy, hypercarbia, hyponatremia  · Recommend BiPAP vs CPAP HS while inpatient once extubated. Patient would benefit from outpatient PFTs and possible CPAP HS  · Continue thiamine  · Follow up previous LP send out studies, low utility for repeat LP  · Neurology consulted, recommendations as follows:  · Somnolence has been going on for a while, some neuro symptoms after chemo in march. · Parneoplastic panel pending, can take up to 2 weeks.  Added CSF VDRL and serum, anti-TPO and DARIN Ab's  · Wait on paraneoplastic panel before considering IVIG/steroids as suspicion is not very high  · Hold off on repeat LP for now per Neuro, supportive care  · Complete course of Zosyn, adequate to cover for possible neurosyphillis given inability to obtain additional CSF  · Currently off sedation   · CAM ICU  · Sleep hygiene  · Neurochecks per routine

## 2023-08-31 NOTE — ASSESSMENT & PLAN NOTE
· Continue prozac  · Evaluated by psychiatry last admission, felt to have depressive mood disorder  · Possibly contributing to weakness on vent despite being off sedation.  Continue to encourage patient to move extremities more to build strength for extubation

## 2023-08-31 NOTE — PROGRESS NOTES
[address] [facility]  Progress Note  Name: Mike Medrano  MRN: [id number]  Unit/Bed#: ICU 03 I Date of Admission: 8/24/2023   Date of Service: 8/31/2023 I Hospital Day: 7    Assessment/Plan   * Acute respiratory failure with hypoxia [facility])  Assessment & Plan  Patient presented with hypoxia, found to have right lower lobe segmental PE. Initially saturating well on nasal cannula. Rapid response was called 8/24 secondary to hypoxia and altered mental status requiring emergent intubation. Hypoxic post intubation requiring high ventilator settings. · CTA PE study 8/23: Pulmonary embolism in the right lower lobe segmental pulmonary artery. Measured RV/LV ratio is within normal limits at less than 0.9.  · Initial CXR post intubation: Near complete opacity of the left lung likely related to worsening infiltrate and/or atelectasis. Patchy right upper lobe infiltrates. · Strep pneumo/legionella urine antigen negative  · COVID/Flu/RSV negative  · 8/25: Bronchoscopy: poorly tolerated secondary to hypoxia. Some mucus plugging present on the left. · 8/26: CT chest: Complete right lower lobe and near complete left lower lobe atelectasis. Pneumonia not excluded in the appropriate clinical setting. Mild interstitial edema with trace effusions  · Received diuresis with IV Lasix   · 8/27 ABG 7.41/48.4/113.4/30.3  · 8/29: Tolerated pressure support 15/6 however required AC/VC for secretions and hypoxia, likely secondary to plugging vs atelectasis  · 8/30: Repeat CXR showed bibasilar atelectasis    Plan:  · 8/31 Current vent settings: AC/VC 14/500/40/6  · Day 8 on Vent  · Continues to be weak. Unclear if muscular etiology vs depression contributing to weakness.  Patient encouraged to move extremities during the day when possible to build up strength for extubation  · Goals of care discussion on 8/30 with Dr. Anjana Jiang, mother, and patient at bedside  · Mother and patient explained about options for tracheostomy and PEG tube if no improvement on vent  · Patient expressed agreement with potential interventions with head nodding and agreeable to mother helping to make medical decisions  · Discussed with gen surg, states can perform both procedures if needed  · If no improvement as patient approaches Day 14 on Vent, will consider above interventions  · Wean Fio2 for goal Sp02 >90  · Empiric antibiotics with Zosyn (Vanco D/C'ed with negative MRSA surveillance) for possible pneumonia  · Antibiotic: Zosyn Day 7/7 (Started 8/25)  · Continue hypertonic saline nebs given thick secretions, Chest PT, Pulmonary hygiene  · VAP ppx    Toxic metabolic encephalopathy  Assessment & Plan  Patient initially presented to [facility] on 8/23 with hypoxia and encephalopathy. Acute change in GCS prompted rapid response and subsequent intubation for airway protection. Of note, pt w/ recent hospitalization to [facility] 8/11-8/17 for encephalopathy with unclear etiology despite extensive workup but Wernicke's encephalopathy was on the differential.    ·  [place] workup 8/11:  · Rapid response and intubation on 8/12 for airway protection, extubated on 8/13  · CT head 8/11: No acute intracranial abnormality. Stable small bilateral subcortical hypoattenuating foci. No associated mass effect. · MRI 8/12: No acute intracranial abnormality. Stable nonspecific enhancement along the anterior floor of the third ventricle/hypothalamus compared to March 2023 study. Findings below:  · Differentials includes chronic toxic metabolic insult (thiamine deficiency), chronic inflammatory/infectious conditions  · Other considerations include Langerhans cell cell histiocytosis, lymphocytic hypophysitis, and neurosyphilis. · Primary CNS lymphoma less likely consideration, given the lack of interval change. Serologic correlation is recommended.  Stable cerebral white matter signal abnormality, presumably the sequela of chronic microangiopathic disease given the patient's history  · VEEG 8/12: negative for seizure activity. Possible findings relating to underlying sleep disorder   · LP: Grossly negative overall, pending some results  · Meningitis/encephalitis panel negative, bacterial/fungal culture and gram negative, lyme and cryptococcal negative  · Pending studies: Paraneoplastic and autoimmune CSF studies still pending, can take up to 2 weeks (sent to [facility])  · DSPO: Treated with high dose thiamine. Psych evaluated, possible bipolar disorder. Discharged to home with his sister with visiting nurses, speech therapy, PT/OT  ·  Michel Washingtone 8/23: Presented with hypoxia found by visiting nurse SPO2 in 80's  · 8/24: Patient's girlfriend visited [address] [place], [place] [address]danyn and found him unresponsive. Notes said that 'eyes were rolled back with secretions around his mouth"  · Rapid response called and intubated for hypoxia and low GCS. VBG ? Post intubation 7.25/76/59/33  · Transferred to [facility] on 8/24  · ASHLEY Lara 8/24 (Current hospital course)  · CT head 8/24: No acute intracranial abnormality  · 8/25 EEG: No evidence of electrographic seizures. Mild background slowing suggestive of underlying cerebral dysfunction from toxic/metablic/infectious/hypoxic encephalopathy. Clinical correlation is recommended  · On neurological exam: wakes to voice, weakly follows one step commands with all extremities     Plan:  · Encephalopathy likely multifactorial in the setting of abnormal (however stable) MRI showing possible wernicke's encephalopathy, hypercarbia, hyponatremia  · Recommend BiPAP vs CPAP HS while inpatient once extubated. Patient would benefit from outpatient PFTs and possible CPAP HS  · Continue thiamine  · Follow up previous LP send out studies, low utility for repeat LP  · Neurology consulted, recommendations as follows:  · Somnolence has been going on for a while, some neuro symptoms after chemo in march. · Parneoplastic panel pending, can take up to 2 weeks.  Added CSF VDRL and serum, anti-TPO and DARIN Ab's  · Wait on paraneoplastic panel before considering IVIG/steroids as suspicion is not very high  · Hold off on repeat LP for now per Neuro, supportive care  · Complete course of Zosyn, adequate to cover for possible neurosyphillis given inability to obtain additional CSF  · Currently off sedation   · CAM ICU  · Sleep hygiene  · Neurochecks per routine     Acute pulmonary embolism without acute cor pulmonale (HCC)  Assessment & Plan  Presented with hypoxia, initially admitted to the floor on supplemental NC  · CTA PE: Pulmonary embolism in the right lower lobe segmental pulmonary artery. Measured RV/LV ratio is within normal limits at less than 0.9. · BNP 9, initial HS troponin 9  · Initiated on heparin gtt, transitioned to therapeutic Lovenox on 8/26  · Developed worsening hypoxia 8/24, rapid response was called and patient was intubated for airway protection and hypoxic  · Now requiring high vent setting  · Echo completed 8/25: Left Ventricle: Left ventricular cavity size is normal. Wall thickness is normal. The left ventricular ejection fraction is 60% by visual estimation. . Wall motion is normal. Diastolic function is normal for age. Right Ventricle: Right ventricular cavity size is normal. Systolic function is normal. Normal tricuspid annular plane systolic excursion (TAPSE) > 1.7 cm. Plan:  · Heparin converted to therapeutic lovenox on 8/26  · Continue Lovenox for now given possible trach/PEG next week  · Will need transition to [address] on DC for 3 months  · Dopplers of LE neg for DVT bilaterally    Depression  Assessment & Plan  · Continue prozac  · Evaluated by psychiatry last admission, felt to have depressive mood disorder  · Possibly contributing to weakness on vent despite being off sedation.  Continue to encourage patient to move extremities more to build strength for extubation    History of LVH (left ventricular hypertrophy)  Assessment & Plan  Previous echocardiogram 6/2/23 showed EF 88%, normal diastolic dysfunction, mild left ventricular hypertrophy, mild RVSP elevation and mild TR  · PTA Med: Torsemide 20 mg daily  · Evaluated by Heart Failure team in July 2023. LVH thought to be secondary to hypertension, obesity, hx of meth use  · Recommended sleep study at that time   · Echo this admission shows normal EF of 60%, normal LV thickness, no diastolic dysfunction    Seminoma of left testis [facility])  Assessment & Plan  · Testicular cancer s/p left radial orchiectomy on 12/2022. Repeat CT scan in January 2023 with enlarging lymph node. Received Etoposide and cistoplatin chemotherapy in March, developed numbness/tingling and vision changes, labile affect and right sided weakness and therefore chemotherapy was stopped. MRI demonstrated white matter changes at that time and he was referred to Neurology   · Continue outpatient follow up with Heme/Onc     Hypertension  Assessment & Plan  Hx of hypertension. PTA was on Torsemide 20 mg daily  · Echo this admission showed EF 60%, normal LV thickness, no systolic or diastolic dysfunction  · Hypotension s/p intubation initially requiring Levophed DC'ed on 8/27  · 8/30: BP's stable MAP 66K-40H    Umbilical hernia without obstruction and without gangrene  Assessment & Plan  · Umbilical hernia present, easily reducible          ICU Core Measures     Vented Patient  VAP Bundle  VAP bundle ordered     A: Assess, Prevent, and Manage Pain · Has pain been assessed? Yes  · Need for changes to pain regimen? No   B: Both Spontaneous Awakening Trials (SATs) and Spontaneous Breathing Trials (SBTs) · Plan to perform spontaneous awakening trial today? Yes   · Plan to perform spontaneous breathing trial today? Yes   · Obvious barriers to extubation? Yes   C: Choice of Sedation · RASS Goal: 0 Alert and Calm  · Need for changes to sedation or analgesia regimen?  No   D: Delirium · CAM-ICU: Unable to perform secondary to Acute cognitive dysfunction   E: Early Mobility  · Plan for early mobility? Yes   F: Family Engagement · Plan for family engagement today? Yes       Antibiotic Review: Antibiotics to be discontinued today    Review of Invasive Devices:      Prophylaxis:  VTE VTE covered by:  enoxaparin, Subcutaneous, 135 mg at 08/30/23 2034       Stress Ulcer  covered byomeprazole (PRILOSEC) suspension 2 mg/mL [[id number]]       Subjective   HPI/24hr events: No acute events overnight. Patient remained on the same vent settings. Urine output 1.5L total yesterday after additional Lasix given. V/S stable. Goals of care discussion had yesterday with mother and patient about Trach and PEG, which they are agreeable to for now. Review of Systems   Unable to perform ROS: Intubated           Objective                            Vitals I/O      Most Recent Min/Max in 24hrs   Temp 97.5 °F (36.4 °C) Temp  Min: 97.3 °F (36.3 °C)  Max: 99.5 °F (37.5 °C)   Pulse 78 Pulse  Min: 78  Max: 112   Resp 15 Resp  Min: 13  Max: 35   /77 BP  Min: 103/55  Max: 127/91   O2 Sat 98 % SpO2  Min: 94 %  Max: 99 %      Intake/Output Summary (Last 24 hours) at 8/31/2023 3943  Last data filed at 8/31/2023 0600  Gross per 24 hour   Intake 3270 ml   Output 1525 ml   Net 1745 ml         Diet Enteral/Parenteral; Tube Feeding No Oral Diet; Jevity 1.2 Scott; Continuous; 70; Prosource Protein Liquid - One Packet Daily; 140; Water; Every 4 hours     Invasive Monitoring Physical exam    Physical Exam  Eyes:      Extraocular Movements: Extraocular movements intact. Conjunctiva/sclera: Conjunctivae normal.   Skin:     General: Skin is warm and dry. Comments: Lt knee dressing C/D/I   Cardiovascular:      Rate and Rhythm: Normal rate and regular rhythm. Heart sounds: No murmur heard. Comments: Distant heart sounds  Musculoskeletal:      Right lower leg: No edema. Left lower leg: No edema. Abdominal:      General: Bowel sounds are normal.      Palpations: Abdomen is soft.       Hernia: A hernia is present. Constitutional:       General: He is not in acute distress. Interventions: He is intubated and restrained. Pulmonary:      Effort: He is intubated. Breath sounds: No wheezing or rhonchi. Neurological:      Mental Status: He is alert. He is calm. Comments: Moving all extremities. Able to give high five. Lifting legs up off stretcher. Genitourinary/Anorectal:  external catheterVitals reviewed. Diagnostic Studies      Imaging:  I have personally reviewed pertinent reports.    and I have personally reviewed pertinent films in PACS     Medications:  Scheduled PRN   albuterol, 2.5 mg, Q6H  chlorhexidine, 15 mL, Q12H ISAEL  enoxaparin, 1 mg/kg, Q12H ISAEL  FLUoxetine, 10 mg, Daily  omeprazole (PRILOSEC) suspension 2 mg/mL, 20 mg, Daily  piperacillin-tazobactam, 3.375 g, Q6H  potassium chloride, 20 mEq, Once   Followed by  potassium chloride, 20 mEq, Once  sodium chloride, 4 mL, Q6H  thiamine, 100 mg, Daily      acetaminophen, 650 mg, Q4H PRN  fentanyl citrate (PF), 50 mcg, Q1H PRN  polyethylene glycol, 17 g, Daily PRN  senna, 17.6 mg, Daily PRN       Continuous          Labs:    CBC    Recent Labs     08/30/23  0601   WBC 8.75   HGB 12.0   HCT 37.0        BMP    Recent Labs     08/30/23  0601 08/31/23  0535   SODIUM 138 139   K 3.6 3.6   CL 97 99   CO2 33* 34*   AGAP 8 6   BUN 11 15   CREATININE 0.88 0.83   CALCIUM 9.3 9.3       Coags    No recent results     Additional Electrolytes  Recent Labs     08/30/23  0601   MG 2.3   PHOS 4.1          Blood Gas    No recent results  No recent results LFTs  No recent results    Infectious  No recent results  Glucose  Recent Labs     08/30/23  0601 08/31/23  0535   GLUC 122 95             Kenneth Brown MD

## 2023-08-31 NOTE — PROGRESS NOTES
Neurology Consult Follow Up      Keily Fay is a 41 y.o. male  ICU 03/ICU 03    [id number]        Assessment/Recommendations:    1. Toxic metabolic encephalopathy  2. Acute respiratory failure with hypoxia  3. Acute pulmonary embolism  4. Testicular seminoma  5. 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 Patient was brought to [facility] after presenting to [address] [place] [address] with hypoxia and altered mental status. He had previously been hospital to [facility] and had extensive work-up including an MRI of the brain with without contrast, LP, VEEG monitoring. June 2021, patient diagnosed with left testicular seminoma. Did not follow-up with urology until December 2022. His imaging at that time showed lymphadenopathy and underwent a radical left orchiectomy in December 2022. Patient received chemotherapy initially. He was on cisplatin which caused him numbness and tingling sensations. He had continued on with further chemotherapy is developed onset of double vision with right-sided weakness, labile affect and noted abnormal MRI studies of the brain showing white matter changes. Patient was evaluated by neurology at [facility] on 8/12/2023. MRI of the brain with without contrast demonstrated stable nonspecific enhancements along the anterior floor of the third ventricle/hypothalamus when compared to March 15, 2023 study. He was placed on v demonstrating intermittent excessive diffuse delta activity during drowsiness EEG as well as excessive slow waves and REM sleep periods. He had 2 episodes of leg and body movements occurring during REM sleep. Suggestive of loss of REM sleep atonia.   He had excessive amount of daytime somnolence and increased sleep with possible transition into REM sleep suggestive of possible REM sleep behavior disorder. Differentials include Wernicke's encephalopathy for which she was started on IV thiamine. To note vitamin B1 level was 84.0. Patient had lumbar puncture, essentially CSF testing was all negative. He continued to await paraneoplastic panel which was a send out to [facility]. Ultimately patient was discharged home with home nursing services. The visiting nurses however found patient at home with low saturations. He appeared to be hypoxic and was brought to the [address] [place] [address] on 2023. Diagnostics found patient to have PE for which she was started on IV heparin drip. He continued to be hypoxic and worsening encephalopathy therefore he was intubated and transferred to [facility] for critical care management. Patient then was seen by neurology who continued supportive care while awaiting paraneoplastic panels in order to consider treatment with IVIG steroids, however was low for paraneoplastic etiology. Attempted to add VDRL to his CSF studies however these have  and were no longer able to add studies to them. He had a serum RPR which was negative. He had anti-TPO which was negative. DARIN and MuSK antibody reflex was added and remain pending at this time. Patient is awake alert in ICU. He is able to follow commands and nod yes and no appropriately to questions. He appears to have equal sensation and CN II through XII are intact. He has some decreased reflexes in the right lower extremity compared to the left. He also has some right upper extremity weakness compared to the left upper extremity. Lower extremities appear to be bilaterally weak. Discussed with the critical care medical team, they are currently in the weaning process of his ventilator. Sustained respiration independently therefore he continues on pressure support with weaning trials.   Medical team to continue working towards extubation. Paraneoplastic panel did return today, was negative for any paraneoplastic abnormalities. Continue to await additional studies which remain pending at this time. Recommendations for outpatient neurological follow up have yet to be determined. Chief Complaint:    Subjective:   Patient awake, unsedated. Nodding yes and no appropriately to questions. Appears to be awake and aware of his situation however cannot fully assess this due to his inability to speak. Reports he feels weak however denies any headache or dizziness. Denies any pain. He does note having some shortness of breath, aware he is on a ventilator and currently being exercise to help improve his strength over time in order to come off the ventilator. Patient is calm, attentive and appears to be paying attention to conversation. He is following commands relatively easily however acknowledges positive weakness in his legs more so than his upper extremities.     Past Medical History:   Diagnosis Date   • Anxiety    • Cancer ([address])    • Depression    • Psychiatric disorder     bipolar     Social History     Socioeconomic History   • Marital status: Single     Spouse name: Not on file   • Number of children: Not on file   • Years of education: Not on file   • Highest education level: Not on file   Occupational History   • Not on file   Tobacco Use   • Smoking status: Former     Packs/day: 0.50     Years: 5.00     Total pack years: 2.50     Types: Cigarettes     Start date: 1/1/2008   • Smokeless tobacco: Never   Vaping Use   • Vaping Use: Never used   Substance and Sexual Activity   • Alcohol use: Not Currently     Comment: 2 cases of beer daily, stopped 3 years ago   • Drug use: Yes     Types: Marijuana     Comment: Medical marijuana   • Sexual activity: Not Currently   Other Topics Concern   • Not on file   Social History Narrative    ** Merged History Encounter **          Social Determinants of Health Financial Resource Strain: Not on file   Food Insecurity: No Food Insecurity (8/29/2023)    Hunger Vital Sign    • Worried About Running Out of Food in the Last Year: Never true    • Ran Out of Food in the Last Year: Never true   Transportation Needs: No Transportation Needs (8/29/2023)    PRAPARE - Transportation    • Lack of Transportation (Medical): No    • Lack of Transportation (Non-Medical): No   Physical Activity: Not on file   Stress: Not on file   Social Connections: Not on file   Intimate Partner Violence: Not on file   Housing Stability: Low Risk  (8/12/2023)    Housing Stability Vital Sign    • Unable to Pay for Housing in the Last Year: No    • Number of Places Lived in the Last Year: 1    • Unstable Housing in the Last Year: No     Family History   Problem Relation Age of Onset   • Coronary artery disease Maternal Aunt        ROS:  Please see HPI for positive symptoms. No fever, no chills, no weight change.   + Weakness/fatigue  Ocular: No diplopia, no blurred vision, spot/zigzag lines   HEENT:  No sore throat, headache or congestion. No neck pain. COR:  No chest pain. No palpitations. Lungs:  no sob  GI:  no  nausea, no vomiting, no diarrhea, no constipation, no anorexia. :  No dysuria, frequency, or urgency. No hematuria. Musculoskeletal:  No joint pain or swelling   Skin:  No rash or itching. Psychiatric:  + anxiety, no depression. Endocrine:  No polyuria or polydipsia.       Objective:  /85   Pulse 90   Temp 99.9 °F (37.7 °C)   Resp (!) 28   Ht 6' 4" (1.93 m)   Wt (!) 138 kg (303 lb 2.1 oz)   SpO2 95%   BMI 36.90 kg/m²     General: alert   Mental status: Orientation appears to be relatively intact however is unable to be fully assessed due to intubation and inability is to verbalize weakness/fatigue  Attention: normal  Knowledge: Unable to be assessed  Language and Speech: Unable to be assessed   Cranial nerves:   CN II: Visual fields are difficult to assess as patient is unable to provide subjective feedback  Fundoscopic exam is normal with sharp discs and no vascular changes. Pupils are 4mm and briskly reactive to light. CN III, IV, VI: At primary gaze, there is no eye deviation. CN V: Facial sensation is intact in all 3 divisions bilaterally. Corneal responses are intact. CN VII: Face appears to be symmetrical, difficult to assess due to intubation normal eye closure CN VIII: Hearing is normal to rubbing fingers  CN IX, X: Palate elevates symmetrically. Phonation is unable to be assessed. CN XI: Head turning and shoulder shrug are intact  CN XII: Tongue is unable to be assessed     Motor: There is no pronator drift of out-stretched arms. Muscle bulk and tone are normal.    Motor exam with some limitation secondary to intubation and safety concerns. Muscle exam  Arm Right Left Leg Right Left   Deltoid   Iliopsoas 4-/5 4/5   Biceps 4+/5 4+/5 Quads 4-/5 4/5   Triceps 4/5 4/5 Hamstrings 4-/5 4/5   Wrist Extension 4/5 4/5 Ankle Dorsi Flexion 4/5 4/5   Wrist Flexion 4/5 4/5 Ankle Plantar Flexion 4+/5 4+/5       Sensory: normal to light touch, temperature, vibration.   Negative for extinction   Gait: Deferred  Coordination: Unable to perform finger-nose due to safety concerns while intubated, difficulties with heel-to-shin testing therefore limited   Reflexes    RJ BJ TJ KJ AJ Plantars Cornejo's   Right 2+ 2+  tr tr Downgoing Not present   Left 2+ 2+  2+ 2+ Downgoing Not present      Heart: Regular rate and rhythm  Lung: Remains intubated   abd: soft, non-tender, non-distended   Skin: dry and intact    Labs:      Lab Results   Component Value Date    WBC 8.75 08/30/2023    HGB 12.0 08/30/2023    HCT 37.0 08/30/2023    MCV 98 08/30/2023     08/30/2023     Lab Results   Component Value Date    HGBA1C 5.2 12/10/2022     Lab Results   Component Value Date    ALT 55 (H) 08/26/2023    AST 23 08/26/2023    ALKPHOS 90 08/26/2023     Lab Results   Component Value Date    GLUCOSE 93 07/28/2023    CALCIUM 9.3 08/31/2023    K 3.6 08/31/2023    CO2 34 (H) 08/31/2023    CL 99 08/31/2023    BUN 15 08/31/2023    CREATININE 0.83 08/31/2023         Review of reports and notes reveal:   Independent review of films/reports:  XR chest portable ICU    Result Date: 8/30/2023  Endotracheal tube in satisfactory position. Bibasilar atelectasis. Workstation performed: [id number]     CT chest wo contrast    Result Date: 8/26/2023  Complete right lower lobe and near complete left lower lobe atelectasis. Pneumonia not excluded in the appropriate clinical setting. Mild interstitial edema with trace effusions. Hepatic steatosis. Workstation performed: [id number]     Bronchoscopy    Result Date: 8/26/2023  Atelectasis secondary to mucoid plugging RECOMMENDATION:  There is no recommended follow-up for this procedure. XR chest portable    Result Date: 8/25/2023  1. Continued low lung volumes with bibasilar atelectasis and small effusions. 2. Right upper lobe opacification likely due to atelectasis and/or loculated pleural fluid. 3. Lines and tubes as noted. Workstation performed: [id number]     XR abdomen 1 view kub    Result Date: 8/25/2023  Nonspecific bowel gas pattern. Endogastric tube as noted. Workstation performed: [id number]     XR chest portable ICU    Result Date: 8/25/2023  New right upper lobe opacification, which may represent atelectasis or pneumonia. Improved linear left midlung atelectasis with stable bilateral lower lung airspace opacities. Workstation performed: [id number]     XR chest 1 view portable    Result Date: 8/25/2023  Stable position of endotracheal and endogastric tubes. Continued interval improvement in aeration of the left lung with linear bandlike atelectasis at the mid to lower left lung. Workstation performed: [id number]     CT head wo contrast    Result Date: 8/24/2023  No acute intracranial abnormality.  Workstation performed: [id number]         Thank you for this consult. Total time of encounter:  30 min  More than 50% of the time was used in counseling and/or coordination of care  Extent of couseling and/or coordination of care discussed with patient at bedside.   Had discussion with patient's family as well as medical team and attending neurology MD.

## 2023-08-31 NOTE — ASSESSMENT & PLAN NOTE
Presented with hypoxia, initially admitted to the floor on supplemental NC  · CTA PE: Pulmonary embolism in the right lower lobe segmental pulmonary artery. Measured RV/LV ratio is within normal limits at less than 0.9. · BNP 9, initial HS troponin 9  · Initiated on heparin gtt, transitioned to therapeutic Lovenox on 8/26  · Developed worsening hypoxia 8/24, rapid response was called and patient was intubated for airway protection and hypoxic  · Now requiring high vent setting  · Echo completed 8/25: Left Ventricle: Left ventricular cavity size is normal. Wall thickness is normal. The left ventricular ejection fraction is 60% by visual estimation. . Wall motion is normal. Diastolic function is normal for age. Right Ventricle: Right ventricular cavity size is normal. Systolic function is normal. Normal tricuspid annular plane systolic excursion (TAPSE) > 1.7 cm.     Plan:  · Heparin converted to therapeutic lovenox on 8/26  · Continue Lovenox for now given possible trach/PEG next week  · Will need transition to [address] on DC for 3 months  · Dopplers of LE neg for DVT bilaterally

## 2023-08-31 NOTE — ASSESSMENT & PLAN NOTE
Patient presented with hypoxia, found to have right lower lobe segmental PE. Initially saturating well on nasal cannula. Rapid response was called 8/24 secondary to hypoxia and altered mental status requiring emergent intubation. Hypoxic post intubation requiring high ventilator settings. · CTA PE study 8/23: Pulmonary embolism in the right lower lobe segmental pulmonary artery. Measured RV/LV ratio is within normal limits at less than 0.9.  · Initial CXR post intubation: Near complete opacity of the left lung likely related to worsening infiltrate and/or atelectasis. Patchy right upper lobe infiltrates. · Strep pneumo/legionella urine antigen negative  · COVID/Flu/RSV negative  · 8/25: Bronchoscopy: poorly tolerated secondary to hypoxia. Some mucus plugging present on the left. · 8/26: CT chest: Complete right lower lobe and near complete left lower lobe atelectasis. Pneumonia not excluded in the appropriate clinical setting. Mild interstitial edema with trace effusions  · Received diuresis with IV Lasix   · 8/27 ABG 7.41/48.4/113.4/30.3  · 8/29: Tolerated pressure support 15/6 however required AC/VC for secretions and hypoxia, likely secondary to plugging vs atelectasis  · 8/30: Repeat CXR showed bibasilar atelectasis    Plan:  · 8/31 Current vent settings: AC/VC 14/500/40/6  · Day 8 on Vent  · Continues to be weak. Unclear if muscular etiology vs depression contributing to weakness.  Patient encouraged to move extremities during the day when possible to build up strength for extubation  · Goals of care discussion on 8/30 with Dr. Darlene Lamar, mother, and patient at bedside  · Mother and patient explained about options for tracheostomy and PEG tube if no improvement on vent  · Patient expressed agreement with potential interventions with head nodding and agreeable to mother helping to make medical decisions  · Discussed with gen surg, states can perform both procedures if needed  · If no improvement as patient Physical Therapy Daily Treatment     Visit Count: 9  Plan of Care Dates: Initial: 8/2/17 Through: 9/27/17  Insurance Information: [facility]  Co-pay=  none  Visit limit= none  Auth req? = none  250 ded met  1000 oop met  100% coinsurance  8/2 brit  Next Referring Provider Visit: 8/22/17- epidural     Referred by: Manpreet Pickens MD  Medical Diagnosis (from order):   M50.90 Cervical neck pain with evidence of disc disease  M47.812 Spondylosis of cervical region without myelopathy or radiculopathy   Insurance: 1. [employer]  2. N/A    Date of Onset: acute exacerbation; 1 year      Diagnosis Precautions: none  Chart reviewed: Relevant co-morbidities, allergies, tests and medications:   • Ryan's esophagus     • Bradycardia       s/p packemaker placement (?bereket-dysrhythmias)   • Coronary artery disease     • Dyslipidemia     • Essential hypertension       unspecified   • Gastroesophageal reflux disease     • Malignant neoplasm (CMS/HCC)       prostate   • Myocardial infarction (CMS/HCC)     • Pacemaker     • Sick sinus syndrome (CMS/HCC)     • Urinary tract infection     A-Fib, history of TIAs       SUBJECTIVE   Patient reports he is still bad with sleeping.  Reports he does have some days where things seem better but hard to tell if there is any long term improvement.  Reports he feels better right when he leaves.  Going to get an epidural next week.     Current Pain:  1/10.    Functional Change:  Sleeping is more difficult to do neck pain.  Reports once in a while symptoms increase during the day towards the end of the day with no known reason.        OBJECTIVE   Observation: forward head     Treatment   Therapeutic Exercise:   UBE retro 6 min, level 4  Manual, see below  Supine over incline on BOSU with folded pillow under head:                        -bilateral Gh extension 15 x    - wood chop 10 x bilateral   Supine with HOB elevated: chin tucks 5 sec hold 10 x   Standing bow and arrow at  approaches Day 14 on Vent, will consider above interventions  · Wean Fio2 for goal Sp02 >90  · Empiric antibiotics with Zosyn (Vanco D/C'ed with negative MRSA surveillance) for possible pneumonia  · Antibiotic: Zosyn Day 7/7 (Started 8/25)  · Continue hypertonic saline nebs given thick secretions, Chest PT, Pulmonary hygiene  · VAP ppx wall 10 x bilateral   Standing T-intersection at wall 10 x bilateral   Standing chin tucks with foam roller at back and towel roll 10 x- many cues for form       Manual Therapy:   Prone rib rocking grade II (pillow under chest)   Prone gentle rib screw grade II (pillow under chest)   Suboccipital release   Hooklying manual cervical traction 2 min hold x 4       Current Home Program (not performed this date except as noted above): 8/2/17: supine chin tuck 10 x 5 seconds, supine general pec stretch x 60 seconds  8/9/17: scap squeezes 10 x, bilateral D2 flexion   9/11/17: issued red theraband for horizontal shoulder abduction x 15, PNF D2 flexion  9/13/17: supine and standing chin tucks 10 x     ASSESSMENT   Patient continues to have poor postural awareness despite constant posture cues for head position.  Discussed head position is most of his problem and he needs to continue to do chin tucks throughout the day.  Needed many cues for proper form with chin tucks. Re-issued for HEP and discussed the importance of compliance with HEP.      Pain after treatment: 2/10 mid thoracic  Result of above outlined education: Needs reinforcement    Goals:       To be obtained by end of this plan of care:  1. Patient independent with modified and progressed home exercise program.  2. Neck Disability Index: Patient will complete form to reflect an improved score from initial score of 32 to less than or equal to 11 (scored 0-100, higher score indicates higher disability) to indicate pt reported improvement in function/disability/impairment (minimal detectable change: 21%).   3. Patient will have pain <2/10 to be able to sleep 6 hours through the night.  4. Patient will be able to rotate head 70 degrees to be able to safely check blind spots.  5. Patient will decrease pec minor tightness to <4 cm to improve posture.     PLAN   10th visit progress note- if no improvement, need to discharge.  If improvement continue to work towards  posture/ thoracic mobility     THERAPY DAILY BILLING   Primary Insurance: [facility] MEDICARE SOLUTIONS  Secondary Insurance: N/A    Evaluation Procedures:  No evaluation codes were used on this date of service    Timed Procedures:  Manual Therapy, 15 minutes  Therapeutic Exercise, 25 minutes    Untimed Procedures:  No untimed codes were used on this date of service    Total Treatment Time: 40 minutes    Physician Signature on file.

## 2023-08-31 NOTE — ASSESSMENT & PLAN NOTE
· Testicular cancer s/p left radial orchiectomy on 12/2022  · Repeat CT scan in January 2023 with enlarging lymph node.   · Began Chemo in March 2023: Etoposide and Cisplatin with plan for 4 cycles, 5 days every 21 days  · Did not complete chemo treatment due to adverse effects  · Stopped Cisplatin after 1 cycle due to painful neuropathy  · Continued Etoposide 2nd cycle  · Resumed Cisplatin and Etoposide 3rd cycle  · After 3rd cycle reported double vision, labile affect and right sided weakness and therefore chemotherapy was stopped  · Stopped chemo on 5/26/23  · MRI demonstrated white matter changes at that time and he was referred to Neurology   · Continue outpatient follow up with Heme/Onc

## 2023-09-01 PROCEDURE — 94668 MNPJ CHEST WALL SBSQ: CPT

## 2023-09-01 PROCEDURE — 99233 SBSQ HOSP IP/OBS HIGH 50: CPT | Performed by: INTERNAL MEDICINE

## 2023-09-01 PROCEDURE — 94003 VENT MGMT INPAT SUBQ DAY: CPT

## 2023-09-01 PROCEDURE — 94640 AIRWAY INHALATION TREATMENT: CPT

## 2023-09-01 PROCEDURE — 94760 N-INVAS EAR/PLS OXIMETRY 1: CPT

## 2023-09-01 PROCEDURE — 94150 VITAL CAPACITY TEST: CPT

## 2023-09-01 PROCEDURE — 94669 MECHANICAL CHEST WALL OSCILL: CPT

## 2023-09-01 RX ORDER — CEFTRIAXONE 2 G/50ML
2000 INJECTION, SOLUTION INTRAVENOUS EVERY 24 HOURS
Status: COMPLETED | OUTPATIENT
Start: 2023-09-01 | End: 2023-09-07

## 2023-09-01 RX ORDER — DOXYCYCLINE HYCLATE 100 MG/1
100 CAPSULE ORAL EVERY 12 HOURS SCHEDULED
Status: DISCONTINUED | OUTPATIENT
Start: 2023-09-01 | End: 2023-09-01

## 2023-09-01 RX ADMIN — ACETAMINOPHEN 650 MG: 650 SUSPENSION ORAL at 04:59

## 2023-09-01 RX ADMIN — ALBUTEROL SULFATE 2.5 MG: 2.5 SOLUTION RESPIRATORY (INHALATION) at 07:08

## 2023-09-01 RX ADMIN — CEFTRIAXONE 2000 MG: 2 INJECTION, SOLUTION INTRAVENOUS at 12:22

## 2023-09-01 RX ADMIN — DOXYCYCLINE 100 MG: 100 INJECTION, POWDER, LYOPHILIZED, FOR SOLUTION INTRAVENOUS at 08:23

## 2023-09-01 RX ADMIN — ALBUTEROL SULFATE 2.5 MG: 2.5 SOLUTION RESPIRATORY (INHALATION) at 13:03

## 2023-09-01 RX ADMIN — SODIUM CHLORIDE SOLN NEBU 3% 4 ML: 3 NEBU SOLN at 07:20

## 2023-09-01 RX ADMIN — ENOXAPARIN SODIUM 135 MG: 150 INJECTION SUBCUTANEOUS at 08:25

## 2023-09-01 RX ADMIN — ENOXAPARIN SODIUM 135 MG: 150 INJECTION SUBCUTANEOUS at 21:20

## 2023-09-01 RX ADMIN — SODIUM CHLORIDE SOLN NEBU 3% 4 ML: 3 NEBU SOLN at 01:00

## 2023-09-01 RX ADMIN — CHLORHEXIDINE GLUCONATE 15 ML: 1.2 RINSE ORAL at 08:23

## 2023-09-01 RX ADMIN — SODIUM CHLORIDE SOLN NEBU 3% 4 ML: 3 NEBU SOLN at 20:09

## 2023-09-01 RX ADMIN — SODIUM CHLORIDE SOLN NEBU 3% 4 ML: 3 NEBU SOLN at 13:18

## 2023-09-01 RX ADMIN — THIAMINE HCL TAB 100 MG 100 MG: 100 TAB at 08:23

## 2023-09-01 RX ADMIN — PIPERACILLIN AND TAZOBACTAM 3.38 G: 36; 4.5 INJECTION, POWDER, FOR SOLUTION INTRAVENOUS at 04:24

## 2023-09-01 RX ADMIN — ALBUTEROL SULFATE 2.5 MG: 2.5 SOLUTION RESPIRATORY (INHALATION) at 20:09

## 2023-09-01 RX ADMIN — FLUOXETINE 10 MG: 10 CAPSULE ORAL at 08:25

## 2023-09-01 RX ADMIN — ALBUTEROL SULFATE 2.5 MG: 2.5 SOLUTION RESPIRATORY (INHALATION) at 01:00

## 2023-09-01 RX ADMIN — CHLORHEXIDINE GLUCONATE 15 ML: 1.2 RINSE ORAL at 21:20

## 2023-09-01 RX ADMIN — ACETAMINOPHEN 650 MG: 650 SUSPENSION ORAL at 16:31

## 2023-09-01 RX ADMIN — Medication 20 MG: at 08:25

## 2023-09-01 NOTE — PROGRESS NOTES
[address] [facility]  Progress Note  Name: Jaye Benitez  MRN: [id number]  Unit/Bed#: ICU 03 I Date of Admission: 8/24/2023   Date of Service: 9/1/2023 I Hospital Day: 8    Assessment/Plan   * Acute respiratory failure with hypoxia [facility])  Assessment & Plan  Unchanged  Patient presented with hypoxia, found to have right lower lobe segmental PE. Initially saturating well on nasal cannula. Rapid response was called 8/24 secondary to hypoxia and altered mental status requiring emergent intubation. Hypoxic post intubation requiring high ventilator settings. · CTA PE study 8/23: Pulmonary embolism in the right lower lobe segmental pulmonary artery. Measured RV/LV ratio is within normal limits at less than 0.9.  · Initial CXR post intubation: Near complete opacity of the left lung likely related to worsening infiltrate and/or atelectasis. Patchy right upper lobe infiltrates. · Strep pneumo/legionella urine antigen negative  · COVID/Flu/RSV negative  · 8/25: Bronchoscopy: poorly tolerated secondary to hypoxia. Some mucus plugging present on the left. · 8/26: CT chest: Complete right lower lobe and near complete left lower lobe atelectasis. Pneumonia not excluded in the appropriate clinical setting. Mild interstitial edema with trace effusions  · Received diuresis with IV Lasix   · 8/27 ABG 7.41/48.4/113.4/30.3  · 8/29: Tolerated pressure support 15/6 however required AC/VC for secretions and hypoxia, likely secondary to plugging vs atelectasis  · 8/30: Repeat CXR showed bibasilar atelectasis  · 9/1: Overnight patient tachypneic to high 20's, secretions noted  · NIFS continue to be weak off sedation, -25 to -31    Plan:  · 9/1 Current vent settings: AC/VC 14/500/40/6  · Day 9 on Vent  · Continues to be weak, unable to wean down O2 requirements  · Unclear if muscular etiology vs depression contributing to weakness.  Patient encouraged to move extremities during the day when possible to build up strength for extubation  · Consider repeat CXR next week   · Goals of care discussion on 8/30 with Dr. Kalyen Prieto, mother, and patient at bedside  · Mother and patient explained about options for tracheostomy and PEG tube if no improvement on vent  · Patient expressed agreement with potential interventions with head nodding and agreeable to mother helping to make medical decisions  · Discussed with gen surg, states can perform both procedures if needed  · If no improvement as patient approaches Day 14 on Vent, will consider above interventions  · Wean Fio2 for goal Sp02 >90  · Antibiotics: Completed Zosyn x 7 days (8/25-8/31) - MRSA nares neg  · Continue Chest PT, hypertonic saline nebs given thick secretions, Pulmonary hygiene  · VAP ppx    Toxic metabolic encephalopathy  Assessment & Plan    Unchanged  [facility] initially presented to [facility] on [facility] with hypoxia and encephalopathy. Acute change in GCS prompted rapid response and subsequent intubation for airway protection. Of note, pt w/ recent hospitalization to [facility] 8/11-8/17 for encephalopathy with unclear etiology despite extensive workup but Wernicke's encephalopathy was on the differential.  ·  [place] workup 8/11:  · Rapid response and intubation on 8/12 for airway protection, extubated on 8/13  · CT head 8/11: No acute intracranial abnormality. Stable small bilateral subcortical hypoattenuating foci. No associated mass effect. · MRI 8/12: No acute intracranial abnormality. Stable nonspecific enhancement along the anterior floor of the third ventricle/hypothalamus compared to March 2023 study. Findings below:  · Differentials includes chronic toxic metabolic insult (thiamine deficiency), chronic inflammatory/infectious conditions  · Other considerations include Langerhans cell cell histiocytosis, lymphocytic hypophysitis, and neurosyphilis. · Primary CNS lymphoma less likely consideration, given the lack of interval change.  Serologic correlation is recommended. Stable cerebral white matter signal abnormality, presumably the sequela of chronic microangiopathic disease given the patient's history  · VEEG 8/12: negative for seizure activity. Possible findings relating to underlying sleep disorder   · LP: Grossly negative overall  · Meningitis/encephalitis panel negative, bacterial/fungal culture and gram negative, lyme and cryptococcal negative  · Paraneoplastic panel neg  · Unable to obtain CSF VDRL  · DSPO: Treated with high dose thiamine. Psych evaluated, possible bipolar disorder. Discharged to home with his sister with visiting nurses, speech therapy, PT/OT  ·  [facility] ([place]) 8/23: Presented with hypoxia found by visiting nurse SPO2 in 80's  · 8/24: Patient's girlfriend visited [address] [place] [address] and found him unresponsive. Notes said that 'eyes were rolled back with secretions around his mouth"  · Rapid response called and intubated for hypoxia and low GCS. VBG ? Post intubation 7.25/76/59/33  · Transferred to [facility] on 8/24  ·  Darin Jarquin 8/24 (Current hospital course)  · CT head 8/24: No acute intracranial abnormality  · 8/25 EEG: No evidence of electrographic seizures. Mild background slowing suggestive of underlying cerebral dysfunction from toxic/metablic/infectious/hypoxic encephalopathy. Clinical correlation is recommended  · On neurological exam: wakes to voice, weakly follows one step commands with all extremities   · Continues to be weak off sedation    Plan:  · Encephalopathy likely multifactorial in the setting of abnormal (however stable) MRI showing possible wernicke's encephalopathy, hypercarbia, hyponatremia  · Neurology consulted, recommendations as follows:  · Somnolence has been going on for a while, some neuro symptoms after chemo in march. · Follow up Ach R Ab  · Serum anti-TPO and DARIN Ab's, RPR, neg. CSF paraneoplastic panel neg.  Unable to obtain CSF VDRL  · Start Doxycycline 100 mg BID 9/1 to cover for possible neurosyphillis  · Repeat LP if not better by Monday  · Psych input  · Recommend BiPAP vs CPAP HS while inpatient once extubated. Patient would benefit from outpatient PFTs and possible CPAP HS  · Continue thiamine  · CAM ICU  · Sleep hygiene  · Neurochecks per routine     Acute pulmonary embolism without acute cor pulmonale (HCC)  Assessment & Plan  Presented with hypoxia, initially admitted to the floor on supplemental NC  · CTA PE: Pulmonary embolism in the right lower lobe segmental pulmonary artery. Measured RV/LV ratio is within normal limits at less than 0.9. · BNP 9, initial HS troponin 9  · Initiated on heparin gtt, transitioned to therapeutic Lovenox on 8/26  · Developed worsening hypoxia 8/24, rapid response was called and patient was intubated for airway protection and hypoxic  · Now requiring high vent setting  · Echo completed 8/25: Left Ventricle: Left ventricular cavity size is normal. Wall thickness is normal. The left ventricular ejection fraction is 60% by visual estimation. . Wall motion is normal. Diastolic function is normal for age. Right Ventricle: Right ventricular cavity size is normal. Systolic function is normal. Normal tricuspid annular plane systolic excursion (TAPSE) > 1.7 cm. Plan:  · Heparin converted to therapeutic lovenox on 8/26  · Continue Lovenox for now given possible trach/PEG next week  · Will need transition to [address] on DC for 3 months  · Dopplers of LE neg for DVT bilaterally    History of LVH (left ventricular hypertrophy)  Assessment & Plan  Previous echocardiogram 6/2/23 showed EF 89%, normal diastolic dysfunction, mild left ventricular hypertrophy, mild RVSP elevation and mild TR  · PTA Med: Torsemide 20 mg daily  · Evaluated by Heart Failure team in July 2023.  LVH thought to be secondary to hypertension, obesity, hx of meth use  · Recommended sleep study at that time   · Echo this admission shows normal EF of 60%, normal LV thickness, no diastolic dysfunction    Depression  Assessment & Plan  · Continue Fluoxetine 10 mg daily  · Evaluated by psychiatry last admission, felt to have depressive mood disorder  · Possibly contributing to weakness on vent despite being off sedation. Continue to encourage patient to move extremities more to build strength for extubation  · Repeat psych evaluation, if able considering patient is intubated    Seminoma of left testis [facility])  Assessment & Plan  · Testicular cancer s/p left radial orchiectomy on 12/2022  · Repeat CT scan in January 2023 with enlarging lymph node. · Began Chemo in March 2023: Etoposide and Cisplatin with plan for 4 cycles, 5 days every 21 days  · Did not complete chemo treatment due to adverse effects  · Stopped Cisplatin after 1 cycle due to painful neuropathy  · Continued Etoposide 2nd cycle  · Resumed Cisplatin and Etoposide 3rd cycle  · After 3rd cycle reported double vision, labile affect and right sided weakness and therefore chemotherapy was stopped  · Stopped chemo on 5/26/23  · MRI demonstrated white matter changes at that time and he was referred to Neurology   · Continue outpatient follow up with Heme/Onc     Hypertension  Assessment & Plan  Hx of hypertension. PTA was on Torsemide 20 mg daily  · Echo this admission showed EF 60%, normal LV thickness, no systolic or diastolic dysfunction  · Hypotension s/p intubation initially requiring Levophed DC'ed on 8/27  · 9/1: BP's stable MAP 16U-841I    Umbilical hernia without obstruction and without gangrene  Assessment & Plan  · Umbilical hernia present, easily reducible          ICU Core Measures     Vented Patient  VAP Bundle  VAP bundle ordered     A: Assess, Prevent, and Manage Pain · Has pain been assessed? Yes  · Need for changes to pain regimen? No   B: Both Spontaneous Awakening Trials (SATs) and Spontaneous Breathing Trials (SBTs) · Plan to perform spontaneous awakening trial today? Yes   · Plan to perform spontaneous breathing trial today? Yes   · Obvious barriers to extubation? Yes   C: Choice of Sedation · RASS Goal: 0 Alert and Calm  · Need for changes to sedation or analgesia regimen? No   D: Delirium · CAM-ICU: Unable to perform secondary to Acute cognitive dysfunction   E: Early Mobility  · Plan for early mobility? Yes   F: Family Engagement · Plan for family engagement today? Yes       Antibiotic Review: Start Ceftriaxone for possible neurosyphillis    Review of Invasive Devices:      Prophylaxis:  VTE VTE covered by:  enoxaparin, Subcutaneous, 135 mg at 08/31/23 2126       Stress Ulcer  covered byomeprazole (PRILOSEC) suspension 2 mg/mL [[id number]]       Subjective   HPI/24hr events:   Patient tachypneic overnight to high 20's and secretions noted. Tolerating pressure support during the day, full settings AC/VC at night. NIFs continue to be low, -25 to -30. Completed Zosyn and starting Doxycycline for neurosyphillis coverage per neurology. Patient seen and examined at bedside having chest PT on pressure support. More withdrawn this morning. Denies any pain. Review of Systems   Unable to perform ROS: Intubated             Objective                            Vitals I/O      Most Recent Min/Max in 24hrs   Temp 98.6 °F (37 °C) Temp  Min: 97.5 °F (36.4 °C)  Max: 100.6 °F (38.1 °C)   Pulse 90 Pulse  Min: 85  Max: 98   Resp 15 Resp  Min: 15  Max: 28   /66 BP  Min: 110/66  Max: 138/87   O2 Sat 96 % SpO2  Min: 94 %  Max: 99 %      Intake/Output Summary (Last 24 hours) at 9/1/2023 0701  Last data filed at 8/31/2023 2304  Gross per 24 hour   Intake 1760 ml   Output 500 ml   Net 1260 ml         Diet Enteral/Parenteral; Tube Feeding No Oral Diet; Jevity 1.2 Scott; Continuous; 70; Prosource Protein Liquid - One Packet Daily; 140; Water; Every 4 hours     Invasive Monitoring Physical exam    Physical Exam  Eyes:      Extraocular Movements: Extraocular movements intact. Conjunctiva/sclera: Conjunctivae normal.   Skin:     General: Skin is warm and dry.       Comments: Lt knee dressing c/d/i. Sacral wound   HENT:      Mouth/Throat:      Mouth: Mucous membranes are dry. Cardiovascular:      Rate and Rhythm: Normal rate and regular rhythm. Heart sounds: Normal heart sounds. Musculoskeletal:      Right lower leg: No edema. Left lower leg: No edema. Abdominal:      General: Bowel sounds are normal.      Palpations: Abdomen is soft. Hernia: A hernia is present. Constitutional:       General: He is not in acute distress. Interventions: He is intubated and restrained. Pulmonary:      Effort: No respiratory distress. He is intubated. Breath sounds: No rhonchi. Comments: Decreased lower breath sounds  Neurological:      Mental Status: He is alert. He is calm. Comments: Moving all extremities but weak and slow movements   Genitourinary/Anorectal:  external catheter         Diagnostic Studies        Imaging: I have personally reviewed pertinent reports.    and I have personally reviewed pertinent films in PACS     Medications:  Scheduled PRN   albuterol, 2.5 mg, Q6H  chlorhexidine, 15 mL, Q12H ISAEL  doxycycline, 100 mg, Q12H  enoxaparin, 1 mg/kg, Q12H ISAEL  FLUoxetine, 10 mg, Daily  omeprazole (PRILOSEC) suspension 2 mg/mL, 20 mg, Daily  sodium chloride, 4 mL, Q6H  thiamine, 100 mg, Daily      acetaminophen, 650 mg, Q4H PRN  fentanyl citrate (PF), 50 mcg, Q1H PRN  polyethylene glycol, 17 g, Daily PRN  senna, 17.6 mg, Daily PRN       Continuous          Labs:    CBC    No recent results  BMP    Recent Labs     08/31/23  0535   SODIUM 139   K 3.6   CL 99   CO2 34*   AGAP 6   BUN 15   CREATININE 0.83   CALCIUM 9.3       Coags    No recent results     Additional Electrolytes  No recent results       Blood Gas    No recent results  No recent results LFTs  No recent results    Infectious  No recent results  Glucose  Recent Labs     08/31/23  0535   GLUC 95             Hipolito Mendieta MD

## 2023-09-01 NOTE — OCCUPATIONAL THERAPY NOTE
Occupational Therapy Cancellation     Patient Name: Dada Camargo  JUWBX'L Date: 9/1/2023  Problem List  Principal Problem:    Acute respiratory failure with hypoxia ([address])  Active Problems:    Umbilical hernia without obstruction and without gangrene    Hypertension    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[address])    History of LVH (left ventricular hypertrophy)    Toxic metabolic encephalopathy    Depression    Acute pulmonary embolism without acute cor pulmonale ([address])    Past Medical History  Past Medical History:   Diagnosis Date    Anxiety     Cancer [facility])     Depression     Psychiatric disorder     bipolar     Past Surgical History  Past Surgical History:   Procedure Laterality Date    IR BIOPSY LYMPH NODE  1/20/2023    IR PORT PLACEMENT  3/14/2023    ORCHIECTOMY Left 12/14/2022    Procedure: ORCHIECTOMY INGUINAL;  Surgeon: Kristopher Tucker MD;  Location: [facility];  Service: Urology           09/01/23 1519   Note Type   Note type Cancelled Session  (Friday 9/1/23)   Cancel Reasons Medical status   Additional Comments OT Orders received and chart review completed. Pt presently intubated. Per progress note, plan for possible trach and PEG tube and IR consulted for repeat lumbar puncture. Will cancel and continue to follow as appropriate / schedule allows.      Zak Carty OTR/L  [id number]  [id number]

## 2023-09-01 NOTE — ASSESSMENT & PLAN NOTE
Previous echocardiogram 6/2/23 showed EF 01%, normal diastolic dysfunction, mild left ventricular hypertrophy, mild RVSP elevation and mild TR  · PTA Med: Torsemide 20 mg daily  · Evaluated by Heart Failure team in July 2023.  LVH thought to be secondary to hypertension, obesity, hx of meth use  · Recommended sleep study at that time   · Echo this admission shows normal EF of 60%, normal LV thickness, no diastolic dysfunction

## 2023-09-01 NOTE — CASE MANAGEMENT
Case Management Discharge Planning Note    Patient name Keily Fay  Location ICU 03/ICU 58 MRN [id number]  : 1985 Date 2023       Current Admission Date: 2023  Current Admission Diagnosis:Acute respiratory failure with hypoxia [facility])   Patient Active Problem List    Diagnosis Date Noted   • Acute respiratory failure with hypoxia ([address]) 2023   • Acute pulmonary embolism without acute cor pulmonale ([address]) 2023   • History of Wernicke's encephalopathy 2023   • Depression 2023   • Toxic metabolic encephalopathy    • Wheezing 2023   • Testicular cancer ([address]) 2023   • (HFpEF) heart failure with preserved ejection fraction ([address]) 2023   • Chronic diastolic heart failure ([address]) 2023   • History of LVH (left ventricular hypertrophy) 2023   • Pure hypertriglyceridemia 2023   • Cerebral gliosis 2023   • Unilateral vestibular schwannoma ([address]) 2023   • Port-A-Cath in place 2023   • Diplopia 2023   • 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   • Urinary hesitancy    • Umbilical hernia without obstruction and without gangrene 12/10/2022   • Tobacco use 12/10/2022   • Retroperitoneal lymphadenopathy 12/10/2022   • Hypertension 12/10/2022      LOS (days): 8  Geometric Mean LOS (GMLOS) (days):   Days to GMLOS:     OBJECTIVE:  Risk of Unplanned Readmission Score: 27.97   Current admission status: Inpatient   Preferred Pharmacy:   [address] [place], [place] [address]scar,[address], [address]  Phone: [phone] Fax: [phone]    Primary Care Provider: Rodriguez Rodriguez MD    Primary Insurance: [facility]  Secondary Insurance:     DISCHARGE DETAILS:    Discharge planning discussed with[de-identified] Mother Feliciano Hams of Choice: Yes  Comments - Freedom of Choice: CM called and spoke with patient's mother Juany Db about recommendation for STR for PT.  Juany Ace stated that her son has been to an [facility] facility in the past and doesn't want to go to one again. Cyndee Cole stated the patient would prefer to have therapy at home. CM made Cyndee Cole aware that [facility] for PT may not be available if patient isn't homebound but CM will look into it. OP is another option for patient.   CM contacted family/caregiver?: Yes  Were Treatment Team discharge recommendations reviewed with patient/caregiver?: Yes  Did patient/caregiver verbalize understanding of patient care needs?: Yes    Contacts  Patient Contacts: Cyndee Cole  Relationship to Patient[de-identified] Family  Contact Method: Phone  Phone Number: [phone]  Reason/Outcome: Discharge Planning, Continuity of Care    Treatment Team Recommendation: Short Term Rehab

## 2023-09-01 NOTE — ASSESSMENT & PLAN NOTE
· Continue Fluoxetine 10 mg daily  · Evaluated by psychiatry last admission, felt to have depressive mood disorder  · Possibly contributing to weakness on vent despite being off sedation.  Continue to encourage patient to move extremities more to build strength for extubation  · Repeat psych evaluation, if able considering patient is intubated

## 2023-09-01 NOTE — ASSESSMENT & PLAN NOTE
Patient presented with hypoxia, found to have right lower lobe segmental PE. Initially saturating well on nasal cannula. Rapid response was called 8/24 secondary to hypoxia and altered mental status requiring emergent intubation. Hypoxic post intubation requiring high ventilator settings. · CTA PE study 8/23: Pulmonary embolism in the right lower lobe segmental pulmonary artery. Measured RV/LV ratio is within normal limits at less than 0.9.  · Initial CXR post intubation: Near complete opacity of the left lung likely related to worsening infiltrate and/or atelectasis. Patchy right upper lobe infiltrates. · Strep pneumo/legionella urine antigen negative  · COVID/Flu/RSV negative  · 8/25: Bronchoscopy: poorly tolerated secondary to hypoxia. Some mucus plugging present on the left. · 8/26: CT chest: Complete right lower lobe and near complete left lower lobe atelectasis. Pneumonia not excluded in the appropriate clinical setting. Mild interstitial edema with trace effusions  · Received diuresis with IV Lasix   · 8/27 ABG 7.41/48.4/113.4/30.3  · 8/29: Tolerated pressure support 15/6 however required AC/VC for secretions and hypoxia, likely secondary to plugging vs atelectasis  · 8/30: Repeat CXR showed bibasilar atelectasis  · NIFS continue to be weak off sedation, -25 to -31  · 9/1: Overnight patient tachypneic to high 20's, secretions noted    Plan:  · 9/1 Current vent settings: AC/VC 14/500/40/6  · Day 9 on Vent  · Continues to be weak. Unclear if muscular etiology vs depression contributing to weakness.  Patient encouraged to move extremities during the day when possible to build up strength for extubation  · Consider repeat CXR next week   · Goals of care discussion on 8/30 with Dr. Ag Saul, mother, and patient at bedside  · Mother and patient explained about options for tracheostomy and PEG tube if no improvement on vent  · Patient expressed agreement with potential interventions with head nodding and agreeable to mother helping to make medical decisions  · Discussed with gen surg, states can perform both procedures if needed  · If no improvement as patient approaches Day 14 on Vent, will consider above interventions  · Wean Fio2 for goal Sp02 >90  · Antibiotics: Completed Zosyn x 7 days (8/25-8/31) - MRSA nares neg  · Continue Chest PT, hypertonic saline nebs given thick secretions, Pulmonary hygiene  · VAP ppx

## 2023-09-01 NOTE — WOUND OSTOMY CARE
Progress Note - Wound   Laith Lot 41 y.o. male MRN: [id number]  Unit/Bed#: ICU 03 Encounter: [id number]      Assessment: This is a follow up visit for this 41year old male patient admitted on 8/24/23 in acute respiratory failure d/t right lower lobe pulmonary embolus. He has a history of HTN, left ventricular hypertrophy, testicular cancer s/p L radical orchiectomy 12/2022, obesity and umbilical hernia. He was received in ICU bed ventilated via oral endotracheal tube. He was awake/alert nodding or shaking head appropriately when asked a question. He is totally dependent upon nursing staff for all of his care at this time. He has condom catheter to gravity.      Assessment Findings:  1-Stage 3 pressure injury to right sacrobuttock is healed. Orders in place for skin care and for prevention. 2-Full thickness wound to left anterior knee with pink granulation tissue and yellow slough - is healed. Orders in place for skin care. 3-Left dorsal hand blisters with dressing in place. Plan:   Skin care plans:  1-Cleanse wound to right sacrobuttock with foaming cleanser & pat dry. Apply Triad paste to entire sacrobuttock area in a thin layer 2x/day & prn soilage/dislodgement. 2-Cleanse blisters to left dorsal hand and left 2nd finger with NSS & pat dry. Open small Dermagran square & apply to blisters in a single layer cut to size. Cover with gauze, michele & tape. Change every other day & prn soilage/dislodgement. 3-Hydraguard to bilateral heels BID and PRN  4-Float heels on 2 pillows to offload pressure so heels are not in contact with mattress or pillows. 5-Ehob pressure redistribution cushion in chair when out of bed. Limit sitting for 1-2 hours at a time due to sacrobuttock breakdown then offload back in bed turning side to side every 2 hours.   6-Moisturize skin daily with skin nourishing cream.  7-Turn/reposition q2h or when medically stable for pressure re-distribution on skin.       Wound 08/11/23 Pressure Injury Buttocks Right (Active)   Wound Image   09/01/23 1502   Wound Description Granulation tissue;Pink 09/01/23 1502   Pressure Injury Stage Healed stage 3 09/01/23 1502   Wound Length (cm) 0 cm 09/01/23 1502   Wound Width (cm) 0 cm 09/01/23 1502   Wound Depth (cm) 0 cm 09/01/23 1502   Wound Surface Area (cm^2) 0 cm^2 09/01/23 1502   Wound Volume (cm^3) 0 cm^3 09/01/23 1502   Calculated Wound Volume (cm^3) 0 cm^3 09/01/23 1502   Change in Wound Size % 100 09/01/23 1502   Drainage Amount None 09/01/23 1502   Treatments Cleansed 09/01/23 1502   Dressing Triad paste 09/01/23 1502   Wound packed? No 09/01/23 1502   Dressing Changed New 09/01/23 1502   Dressing Status Intact 09/01/23 1502       Wound 08/28/23 Knee Anterior; Left (Active)   Wound Image   09/01/23 1509   Wound Description Epithelialization 09/01/23 1509   Wound Length (cm) 0 cm 09/01/23 1509   Wound Width (cm) 0 cm 09/01/23 1509   Wound Depth (cm) 0 cm 09/01/23 1509   Wound Surface Area (cm^2) 0 cm^2 09/01/23 1509   Wound Volume (cm^3) 0 cm^3 09/01/23 1509   Calculated Wound Volume (cm^3) 0 cm^3 09/01/23 1509   Change in Wound Size % 100 09/01/23 1509   Drainage Amount None 09/01/23 1509   Non-staged Wound Description Not applicable 09/52/88 9748   Treatments Cleansed 09/01/23 1509   Dressing Status Removed 09/01/23 1509     Discussed assessment findings, and plan of care/recommendations with Lauren Roman RN.     Wound care will follow along with patient throughout admission, please call or tiger text with questions and concerns.     Recommendations written as orders.   Jazz MAYER, RN, Kasia Ness

## 2023-09-01 NOTE — ASSESSMENT & PLAN NOTE
Patient initially presented to [facility] on 8/23 with hypoxia and encephalopathy. Acute change in GCS prompted rapid response and subsequent intubation for airway protection. Of note, pt w/ recent hospitalization to [facility] 8/11-8/17 for encephalopathy with unclear etiology despite extensive workup but Wernicke's encephalopathy was on the differential.    ·  [place] workup 8/11:  · Rapid response and intubation on 8/12 for airway protection, extubated on 8/13  · CT head 8/11: No acute intracranial abnormality. Stable small bilateral subcortical hypoattenuating foci. No associated mass effect. · MRI 8/12: No acute intracranial abnormality. Stable nonspecific enhancement along the anterior floor of the third ventricle/hypothalamus compared to March 2023 study. Findings below:  · Differentials includes chronic toxic metabolic insult (thiamine deficiency), chronic inflammatory/infectious conditions  · Other considerations include Langerhans cell cell histiocytosis, lymphocytic hypophysitis, and neurosyphilis. · Primary CNS lymphoma less likely consideration, given the lack of interval change. Serologic correlation is recommended. Stable cerebral white matter signal abnormality, presumably the sequela of chronic microangiopathic disease given the patient's history  · VEEG 8/12: negative for seizure activity. Possible findings relating to underlying sleep disorder   · LP: Grossly negative overall  · Meningitis/encephalitis panel negative, bacterial/fungal culture and gram negative, lyme and cryptococcal negative  · Paraneoplastic panel neg  · Unable to obtain CSF VDRL  · DSPO: Treated with high dose thiamine. Psych evaluated, possible bipolar disorder. Discharged to home with his sister with visiting nurses, speech therapy, PT/OT  · [facility] 8/23: Presented with hypoxia found by visiting nurse SPO2 in 80's  · 8/24: Patient's girlfriend visited [facility] and found him unresponsive.  Notes said that 'eyes were rolled back with secretions around his mouth"  · Rapid response called and intubated for hypoxia and low GCS. VBG ? Post intubation 7.25/76/59/33  · Transferred to [facility] on 8/24  · ASHLEY Nunez 8/24 (Current hospital course)  · CT head 8/24: No acute intracranial abnormality  · 8/25 EEG: No evidence of electrographic seizures. Mild background slowing suggestive of underlying cerebral dysfunction from toxic/metablic/infectious/hypoxic encephalopathy. Clinical correlation is recommended  · On neurological exam: wakes to voice, weakly follows one step commands with all extremities   · Continues to be weak off sedation    Plan:  · Encephalopathy likely multifactorial in the setting of abnormal (however stable) MRI showing possible wernicke's encephalopathy, hypercarbia, hyponatremia  · Neurology consulted, recommendations as follows:  · Somnolence has been going on for a while, some neuro symptoms after chemo in march. · Follow up Ach R Ab  · Serum anti-TPO and DARIN Ab's, RPR, neg. CSF paraneoplastic panel neg. Unable to obtain CSF VDRL  · Start Doxycycline 100 mg BID 9/1 to cover for possible neurosyphillis  · Repeat LP if not better by Monday  · Psych input  · Recommend BiPAP vs CPAP HS while inpatient once extubated.  Patient would benefit from outpatient PFTs and possible CPAP HS  · Continue thiamine  · CAM ICU  · Sleep hygiene  · Neurochecks per routine

## 2023-09-01 NOTE — RESPIRATORY THERAPY NOTE
Received patient on AC/VC 14/500/40$ +6 tolerating therapy. Switched him to PS 14/6 40% tolerating therapy. Restraints secure, bed therapy and treatments were given.

## 2023-09-01 NOTE — QUICK NOTE
Mother Disha Orosco updated over the phone of patient's current status and plan. Sister Meaghan Florez and aunt at bedside updated as well. Still agreeable with plans for Trach/PEG and repeat LP next week if needed. All questions answered.

## 2023-09-01 NOTE — ASSESSMENT & PLAN NOTE
Hx of hypertension.  PTA was on Torsemide 20 mg daily  · Echo this admission showed EF 60%, normal LV thickness, no systolic or diastolic dysfunction  · Hypotension s/p intubation initially requiring Levophed DC'ed on 8/27  · 9/1: BP's stable MAP 80s-100s

## 2023-09-02 ENCOUNTER — APPOINTMENT (OUTPATIENT)
Dept: RADIOLOGY | Facility: HOSPITAL | Age: 38
DRG: 005 | End: 2023-09-02
Payer: COMMERCIAL

## 2023-09-02 LAB
ANION GAP SERPL CALCULATED.3IONS-SCNC: 6 MMOL/L
BASOPHILS # BLD AUTO: 0.05 THOUSANDS/ÂΜL (ref 0–0.1)
BASOPHILS NFR BLD AUTO: 1 % (ref 0–1)
BUN SERPL-MCNC: 17 MG/DL (ref 5–25)
CALCIUM SERPL-MCNC: 9.2 MG/DL (ref 8.4–10.2)
CHLORIDE SERPL-SCNC: 104 MMOL/L (ref 96–108)
CO2 SERPL-SCNC: 32 MMOL/L (ref 21–32)
CREAT SERPL-MCNC: 0.86 MG/DL (ref 0.6–1.3)
EOSINOPHIL # BLD AUTO: 0.13 THOUSAND/ÂΜL (ref 0–0.61)
EOSINOPHIL NFR BLD AUTO: 2 % (ref 0–6)
ERYTHROCYTE [DISTWIDTH] IN BLOOD BY AUTOMATED COUNT: 15.3 % (ref 11.6–15.1)
GFR SERPL CREATININE-BSD FRML MDRD: 111 ML/MIN/1.73SQ M
GLUCOSE SERPL-MCNC: 88 MG/DL (ref 65–140)
HCT VFR BLD AUTO: 36.9 % (ref 36.5–49.3)
HGB BLD-MCNC: 11.3 G/DL (ref 12–17)
IMM GRANULOCYTES # BLD AUTO: 0.06 THOUSAND/UL (ref 0–0.2)
IMM GRANULOCYTES NFR BLD AUTO: 1 % (ref 0–2)
LYMPHOCYTES # BLD AUTO: 1.33 THOUSANDS/ÂΜL (ref 0.6–4.47)
LYMPHOCYTES NFR BLD AUTO: 16 % (ref 14–44)
MAGNESIUM SERPL-MCNC: 2.3 MG/DL (ref 1.9–2.7)
MCH RBC QN AUTO: 30.6 PG (ref 26.8–34.3)
MCHC RBC AUTO-ENTMCNC: 30.6 G/DL (ref 31.4–37.4)
MCV RBC AUTO: 100 FL (ref 82–98)
MONOCYTES # BLD AUTO: 0.7 THOUSAND/ÂΜL (ref 0.17–1.22)
MONOCYTES NFR BLD AUTO: 9 % (ref 4–12)
NEUTROPHILS # BLD AUTO: 5.93 THOUSANDS/ÂΜL (ref 1.85–7.62)
NEUTS SEG NFR BLD AUTO: 71 % (ref 43–75)
NRBC BLD AUTO-RTO: 0 /100 WBCS
PHOSPHATE SERPL-MCNC: 4.1 MG/DL (ref 2.7–4.5)
PLATELET # BLD AUTO: 298 THOUSANDS/UL (ref 149–390)
PMV BLD AUTO: 9.2 FL (ref 8.9–12.7)
POTASSIUM SERPL-SCNC: 4 MMOL/L (ref 3.5–5.3)
RBC # BLD AUTO: 3.69 MILLION/UL (ref 3.88–5.62)
SODIUM SERPL-SCNC: 142 MMOL/L (ref 135–147)
WBC # BLD AUTO: 8.2 THOUSAND/UL (ref 4.31–10.16)

## 2023-09-02 PROCEDURE — 94640 AIRWAY INHALATION TREATMENT: CPT

## 2023-09-02 PROCEDURE — 71045 X-RAY EXAM CHEST 1 VIEW: CPT

## 2023-09-02 PROCEDURE — 80048 BASIC METABOLIC PNL TOTAL CA: CPT

## 2023-09-02 PROCEDURE — 99232 SBSQ HOSP IP/OBS MODERATE 35: CPT | Performed by: INTERNAL MEDICINE

## 2023-09-02 PROCEDURE — 85025 COMPLETE CBC W/AUTO DIFF WBC: CPT

## 2023-09-02 PROCEDURE — 94003 VENT MGMT INPAT SUBQ DAY: CPT

## 2023-09-02 PROCEDURE — 94669 MECHANICAL CHEST WALL OSCILL: CPT

## 2023-09-02 PROCEDURE — 94668 MNPJ CHEST WALL SBSQ: CPT

## 2023-09-02 PROCEDURE — 94760 N-INVAS EAR/PLS OXIMETRY 1: CPT

## 2023-09-02 PROCEDURE — 84100 ASSAY OF PHOSPHORUS: CPT

## 2023-09-02 PROCEDURE — 94150 VITAL CAPACITY TEST: CPT

## 2023-09-02 PROCEDURE — 83735 ASSAY OF MAGNESIUM: CPT

## 2023-09-02 RX ADMIN — ACETAMINOPHEN 650 MG: 650 SUSPENSION ORAL at 18:33

## 2023-09-02 RX ADMIN — CEFTRIAXONE 2000 MG: 2 INJECTION, SOLUTION INTRAVENOUS at 12:08

## 2023-09-02 RX ADMIN — ENOXAPARIN SODIUM 135 MG: 150 INJECTION SUBCUTANEOUS at 08:04

## 2023-09-02 RX ADMIN — CHLORHEXIDINE GLUCONATE 15 ML: 1.2 RINSE ORAL at 20:21

## 2023-09-02 RX ADMIN — FLUOXETINE 10 MG: 10 CAPSULE ORAL at 08:04

## 2023-09-02 RX ADMIN — CHLORHEXIDINE GLUCONATE 15 ML: 1.2 RINSE ORAL at 08:03

## 2023-09-02 RX ADMIN — ALBUTEROL SULFATE 2.5 MG: 2.5 SOLUTION RESPIRATORY (INHALATION) at 19:14

## 2023-09-02 RX ADMIN — SODIUM CHLORIDE SOLN NEBU 3% 4 ML: 3 NEBU SOLN at 08:08

## 2023-09-02 RX ADMIN — ALBUTEROL SULFATE 2.5 MG: 2.5 SOLUTION RESPIRATORY (INHALATION) at 01:45

## 2023-09-02 RX ADMIN — ALBUTEROL SULFATE 2.5 MG: 2.5 SOLUTION RESPIRATORY (INHALATION) at 08:08

## 2023-09-02 RX ADMIN — SODIUM CHLORIDE SOLN NEBU 3% 4 ML: 3 NEBU SOLN at 13:28

## 2023-09-02 RX ADMIN — SODIUM CHLORIDE SOLN NEBU 3% 4 ML: 3 NEBU SOLN at 01:45

## 2023-09-02 RX ADMIN — THIAMINE HCL TAB 100 MG 100 MG: 100 TAB at 08:03

## 2023-09-02 RX ADMIN — ENOXAPARIN SODIUM 135 MG: 150 INJECTION SUBCUTANEOUS at 20:21

## 2023-09-02 RX ADMIN — Medication 20 MG: at 08:03

## 2023-09-02 RX ADMIN — ACETAMINOPHEN 650 MG: 650 SUSPENSION ORAL at 04:54

## 2023-09-02 RX ADMIN — SODIUM CHLORIDE SOLN NEBU 3% 4 ML: 3 NEBU SOLN at 19:14

## 2023-09-02 RX ADMIN — ALBUTEROL SULFATE 2.5 MG: 2.5 SOLUTION RESPIRATORY (INHALATION) at 13:28

## 2023-09-02 RX ADMIN — FENTANYL CITRATE 50 MCG: 50 INJECTION, SOLUTION INTRAMUSCULAR; INTRAVENOUS at 05:07

## 2023-09-02 NOTE — ASSESSMENT & PLAN NOTE
Patient presented with hypoxia, found to have right lower lobe segmental PE. Initially saturating well on nasal cannula. Rapid response was called 8/24 secondary to hypoxia and altered mental status requiring emergent intubation. Hypoxic post intubation requiring high ventilator settings. · CTA PE study 8/23: Pulmonary embolism in the right lower lobe segmental pulmonary artery. Measured RV/LV ratio is within normal limits at less than 0.9.  · Initial CXR post intubation: Near complete opacity of the left lung likely related to worsening infiltrate and/or atelectasis. Patchy right upper lobe infiltrates. · Strep pneumo/legionella urine antigen negative  · COVID/Flu/RSV negative  · 8/25: Bronchoscopy: poorly tolerated secondary to hypoxia. Some mucus plugging present on the left. · 8/26: CT chest: Complete right lower lobe and near complete left lower lobe atelectasis. Pneumonia not excluded in the appropriate clinical setting. Mild interstitial edema with trace effusions  · Received diuresis with IV Lasix   · 8/27 ABG 7.41/48.4/113.4/30.3  · 8/29: Tolerated pressure support 15/6 however required AC/VC for secretions and hypoxia, likely secondary to plugging vs atelectasis  · 8/30: Repeat CXR showed bibasilar atelectasis  · NIFS continue to be weak off sedation, -25 to -31  · 9/1: Overnight patient tachypneic to high 20's, secretions noted    Plan:  · 9/1 Current vent settings: AC/VC 14/500/40/6  · Day 10 on Vent  · Continues to be weak. Unclear if muscular etiology vs depression contributing to weakness.  Patient encouraged to move extremities during the day when possible to build up strength for extubation  · Goals of care discussion on 8/30 with Dr. Cj Pino, mother, and patient at bedside  · Mother and patient explained about options for tracheostomy and PEG tube if no improvement on vent  · Patient expressed agreement with potential interventions with head nodding and agreeable to mother helping to make medical decisions  · Discussed with gen surg, states can perform both procedures if needed  · If no improvement as patient approaches Day 14 on Vent, will consider above interventions (planned for likely Tuesday or Wednesday  · Wean Fio2 for goal Sp02 >90  · Antibiotics: Completed Zosyn x 7 days (8/25-8/31) - MRSA nares neg.  Ceftriaxone now for 7 days for neurosyphilis coverage  · Continue Chest PT, hypertonic saline nebs given thick secretions, Pulmonary hygiene  · VAP ppx

## 2023-09-02 NOTE — RESPIRATORY THERAPY NOTE
RT Ventilator Management Note  Abel Nelson 41 y.o. male MRN: [id number]  Unit/Bed#: ICU 03 Encounter: [id number]      Daily Screen       9/2/2023  0808 9/2/2023  1146          Patient safety screen outcome[[id number]] Passed --      NIF: -- -37 cm H2O              Physical Exam:   General Appearance: Drowsy, Sleeping  Respiratory Pattern: Assisted, Symmetrical  Chest Assessment: Chest expansion symmetrical  O2 Device: Ventilato  Subjective Data: Wrist restraints secure      PSV + 15 for about 4 hours today resumed AC mode after nursing care due to tachypnea, sx throughout the day for moderate amounts thick white secretions

## 2023-09-02 NOTE — ASSESSMENT & PLAN NOTE
Previous echocardiogram 6/2/23 showed EF 93%, normal diastolic dysfunction, mild left ventricular hypertrophy, mild RVSP elevation and mild TR  · PTA Med: Torsemide 20 mg daily  · Evaluated by Heart Failure team in July 2023.  LVH thought to be secondary to hypertension, obesity, hx of meth use  · Recommended sleep study at that time   · Echo this admission shows normal EF of 60%, normal LV thickness, no diastolic dysfunction

## 2023-09-02 NOTE — PROGRESS NOTES
[id number] [facility]  Progress Note  Name: Melquiades Patricio  MRN: [id number]  Unit/Bed#: ICU 03 I Date of Admission: 8/24/2023   Date of Service: 9/2/2023 I Hospital Day: 9    Assessment/Plan   * Acute respiratory failure with hypoxia [facility])  Assessment & Plan  Patient presented with hypoxia, found to have right lower lobe segmental PE. Initially saturating well on nasal cannula. Rapid response was called 8/24 secondary to hypoxia and altered mental status requiring emergent intubation. Hypoxic post intubation requiring high ventilator settings. · CTA PE study 8/23: Pulmonary embolism in the right lower lobe segmental pulmonary artery. Measured RV/LV ratio is within normal limits at less than 0.9.  · Initial CXR post intubation: Near complete opacity of the left lung likely related to worsening infiltrate and/or atelectasis. Patchy right upper lobe infiltrates. · Strep pneumo/legionella urine antigen negative  · COVID/Flu/RSV negative  · 8/25: Bronchoscopy: poorly tolerated secondary to hypoxia. Some mucus plugging present on the left. · 8/26: CT chest: Complete right lower lobe and near complete left lower lobe atelectasis. Pneumonia not excluded in the appropriate clinical setting. Mild interstitial edema with trace effusions  · Received diuresis with IV Lasix   · 8/27 ABG 7.41/48.4/113.4/30.3  · 8/29: Tolerated pressure support 15/6 however required AC/VC for secretions and hypoxia, likely secondary to plugging vs atelectasis  · 8/30: Repeat CXR showed bibasilar atelectasis  · NIFS continue to be weak off sedation, -25 to -31  · 9/1: Overnight patient tachypneic to high 20's, secretions noted    Plan:  · 9/1 Current vent settings: AC/VC 14/500/40/6  · Day 10 on Vent  · Continues to be weak. Unclear if muscular etiology vs depression contributing to weakness.  Patient encouraged to move extremities during the day when possible to build up strength for extubation  · Goals of care discussion on 8/30 with Dr. Nikolai Mcgovern, mother, and patient at bedside  · Mother and patient explained about options for tracheostomy and PEG tube if no improvement on vent  · Patient expressed agreement with potential interventions with head nodding and agreeable to mother helping to make medical decisions  · Discussed with gen surg, states can perform both procedures if needed  · If no improvement as patient approaches Day 14 on Vent, will consider above interventions (planned for likely Tuesday or Wednesday  · Wean Fio2 for goal Sp02 >90  · Antibiotics: Completed Zosyn x 7 days (8/25-8/31) - MRSA nares neg. Ceftriaxone now for 7 days for neurosyphilis coverage  · Continue Chest PT, hypertonic saline nebs given thick secretions, Pulmonary hygiene  · VAP ppx    Toxic metabolic encephalopathy  Assessment & Plan  Patient initially presented to [facility] on 8/23 with hypoxia and encephalopathy. Acute change in GCS prompted rapid response and subsequent intubation for airway protection. Of note, pt w/ recent hospitalization to [facility] 8/11-8/17 for encephalopathy with unclear etiology despite extensive workup but Wernicke's encephalopathy was on the differential.    · SL Protection workup 8/11:  · Rapid response and intubation on 8/12 for airway protection, extubated on 8/13  · CT head 8/11: No acute intracranial abnormality. Stable small bilateral subcortical hypoattenuating foci. No associated mass effect. · MRI 8/12: No acute intracranial abnormality. Stable nonspecific enhancement along the anterior floor of the third ventricle/hypothalamus compared to March 2023 study. Findings below:  · Differentials includes chronic toxic metabolic insult (thiamine deficiency), chronic inflammatory/infectious conditions  · Other considerations include Langerhans cell cell histiocytosis, lymphocytic hypophysitis, and neurosyphilis. · Primary CNS lymphoma less likely consideration, given the lack of interval change.  Serologic correlation is recommended. Stable cerebral white matter signal abnormality, presumably the sequela of chronic microangiopathic disease given the patient's history  · VEEG 8/12: negative for seizure activity. Possible findings relating to underlying sleep disorder   · LP: Grossly negative overall  · Meningitis/encephalitis panel negative, bacterial/fungal culture and gram negative, lyme and cryptococcal negative  · Paraneoplastic panel neg  · Unable to obtain CSF VDRL  · DSPO: Treated with high dose thiamine. Psych evaluated, possible bipolar disorder. Discharged to home with his sister with visiting nurses, speech therapy, PT/OT  · [facility] 8/23: Presented with hypoxia found by visiting nurse SPO2 in 80's  · 8/24: Patient's girlfriend visited [address] [place] [address] and found him unresponsive. Notes said that 'eyes were rolled back with secretions around his mouth"  · Rapid response called and intubated for hypoxia and low GCS. VBG ? Post intubation 7.25/76/59/33  · Transferred to [facility] on 8/24  · [facility] 8/24 (Current hospital course)  · CT head 8/24: No acute intracranial abnormality  · 8/25 EEG: No evidence of electrographic seizures. Mild background slowing suggestive of underlying cerebral dysfunction from toxic/metablic/infectious/hypoxic encephalopathy. Clinical correlation is recommended  · On neurological exam: wakes to voice, weakly follows one step commands with all extremities   · Continues to be weak off sedation    Plan:  · Encephalopathy likely multifactorial in the setting of abnormal (however stable) MRI showing possible wernicke's encephalopathy, hypercarbia, hyponatremia  · Neurology consulted, recommendations as follows:  · Somnolence has been going on for a while, some neuro symptoms after chemo in march. · Follow up Ach R Ab  · Serum anti-TPO and DARIN Ab's, RPR, neg. CSF paraneoplastic panel neg.  Unable to obtain CSF VDRL  · Start Ceftriaxone 9/1 to cover for possible neurosyphillis for 7 more days  · Repeat LP next week if not better by Monday  · Psych input? · Recommend BiPAP vs CPAP HS while inpatient once extubated. Patient would benefit from outpatient PFTs and possible CPAP HS  · Continue thiamine  · CAM ICU  · Sleep hygiene  · Neurochecks per routine     Acute pulmonary embolism without acute cor pulmonale (HCC)  Assessment & Plan  Presented with hypoxia, initially admitted to the floor on supplemental NC  · CTA PE: Pulmonary embolism in the right lower lobe segmental pulmonary artery. Measured RV/LV ratio is within normal limits at less than 0.9. · BNP 9, initial HS troponin 9  · Initiated on heparin gtt, transitioned to therapeutic Lovenox on 8/26  · Developed worsening hypoxia 8/24, rapid response was called and patient was intubated for airway protection and hypoxic  · Now requiring high vent setting  · Echo completed 8/25: Left Ventricle: Left ventricular cavity size is normal. Wall thickness is normal. The left ventricular ejection fraction is 60% by visual estimation. . Wall motion is normal. Diastolic function is normal for age. Right Ventricle: Right ventricular cavity size is normal. Systolic function is normal. Normal tricuspid annular plane systolic excursion (TAPSE) > 1.7 cm. Plan:  · Heparin converted to therapeutic lovenox on 8/26  · Continue Lovenox for now given possible trach/PEG next week  · Will need transition to [address] on DC for 3 months  · Dopplers of LE neg for DVT bilaterally    History of LVH (left ventricular hypertrophy)  Assessment & Plan  Previous echocardiogram 6/2/23 showed EF 35%, normal diastolic dysfunction, mild left ventricular hypertrophy, mild RVSP elevation and mild TR  · PTA Med: Torsemide 20 mg daily  · Evaluated by Heart Failure team in July 2023.  LVH thought to be secondary to hypertension, obesity, hx of meth use  · Recommended sleep study at that time   · Echo this admission shows normal EF of 60%, normal LV thickness, no diastolic dysfunction    Depression  Assessment & Plan  · Continue Fluoxetine 10 mg daily  · Evaluated by psychiatry last admission, felt to have depressive mood disorder  · Possibly contributing to weakness on vent despite being off sedation. Continue to encourage patient to move extremities more to build strength for extubation  · Repeat psych evaluation, if able considering patient is intubated    Seminoma of left testis [facility])  Assessment & Plan  · Testicular cancer s/p left radial orchiectomy on 12/2022  · Repeat CT scan in January 2023 with enlarging lymph node. · Began Chemo in March 2023: Etoposide and Cisplatin with plan for 4 cycles, 5 days every 21 days  · Did not complete chemo treatment due to adverse effects  · Stopped Cisplatin after 1 cycle due to painful neuropathy  · Continued Etoposide 2nd cycle  · Resumed Cisplatin and Etoposide 3rd cycle  · After 3rd cycle reported double vision, labile affect and right sided weakness and therefore chemotherapy was stopped  · Stopped chemo on 5/26/23  · MRI demonstrated white matter changes at that time and he was referred to Neurology   · Continue outpatient follow up with Heme/Onc     Hypertension  Assessment & Plan  Hx of hypertension. PTA was on Torsemide 20 mg daily  · Echo this admission showed EF 60%, normal LV thickness, no systolic or diastolic dysfunction  · Hypotension s/p intubation initially requiring Levophed DC'ed on 8/27  · 9/1: BP's stable MAP 32Y-755Z    Umbilical hernia without obstruction and without gangrene  Assessment & Plan  · Umbilical hernia present, easily reducible            ICU Core Measures     Vented Patient  VAP Bundle  VAP bundle ordered     A: Assess, Prevent, and Manage Pain · Has pain been assessed? Yes  · Need for changes to pain regimen? No   B: Both Spontaneous Awakening Trials (SATs) and Spontaneous Breathing Trials (SBTs) · Plan to perform spontaneous awakening trial today? Yes   · Plan to perform spontaneous breathing trial today?  Yes   · Obvious barriers to extubation? No   C: Choice of Sedation · RASS Goal: 0 Alert and Calm  · Need for changes to sedation or analgesia regimen? No   D: Delirium · CAM-ICU: Negative   E: Early Mobility  · Plan for early mobility? Yes   F: Family Engagement · Plan for family engagement today? Yes       Antibiotic Review: Patient on appropriate coverage based on culture data. Review of Invasive Devices:      Central access plan: port a cath in place      Prophylaxis:  VTE VTE covered by:  enoxaparin, Subcutaneous, 135 mg at 09/01/23 2120       Stress Ulcer  covered byomeprazole (PRILOSEC) suspension 2 mg/mL [[id number]]       Subjective   HPI/24hr events: no events overnight  Review of Systems   Unable to perform ROS: Intubated          Objective                            Vitals I/O      Most Recent Min/Max in 24hrs   Temp (!) 101.7 °F (38.7 °C) Temp  Min: 97.2 °F (36.2 °C)  Max: 101.7 °F (38.7 °C)   Pulse 105 Pulse  Min: 79  Max: 105   Resp (!) 26 Resp  Min: 14  Max: 30   /74 BP  Min: 106/66  Max: 124/84   O2 Sat 95 % SpO2  Min: 91 %  Max: 98 %      Intake/Output Summary (Last 24 hours) at 9/2/2023 0505  Last data filed at 9/2/2023 0457  Gross per 24 hour   Intake 1865 ml   Output 1150 ml   Net 715 ml         Diet Enteral/Parenteral; Tube Feeding No Oral Diet; Jevity 1.2 Scott; Continuous; 70; Prosource Protein Liquid - One Packet Daily; 140; Water; Every 4 hours     Invasive Monitoring Physical exam    Physical Exam  Eyes:      Pupils: Pupils are equal, round, and reactive to light. Skin:     General: Skin is warm. HENT:      Head: Normocephalic and atraumatic. Cardiovascular:      Rate and Rhythm: Normal rate. Pulses: Normal pulses. Abdominal:      Palpations: Abdomen is soft. Tenderness: There is no abdominal tenderness. Hernia: A hernia is present. Constitutional:       General: He is not in acute distress. Pulmonary:      Effort: Pulmonary effort is normal. No respiratory distress.    Neurological: General: No focal deficit present. Mental Status: He is alert. Vitals and nursing note reviewed. Diagnostic Studies      EKG:   Imaging:  I have personally reviewed pertinent reports.        Medications:  Scheduled PRN   albuterol, 2.5 mg, Q6H  cefTRIAXone, 2,000 mg, Q24H  chlorhexidine, 15 mL, Q12H ISAEL  enoxaparin, 1 mg/kg, Q12H ISAEL  FLUoxetine, 10 mg, Daily  omeprazole (PRILOSEC) suspension 2 mg/mL, 20 mg, Daily  sodium chloride, 4 mL, Q6H  thiamine, 100 mg, Daily      acetaminophen, 650 mg, Q4H PRN  fentanyl citrate (PF), 50 mcg, Q1H PRN  polyethylene glycol, 17 g, Daily PRN  senna, 17.6 mg, Daily PRN       Continuous          Labs:    CBC    No recent results  BMP    Recent Labs     08/31/23  0535   SODIUM 139   K 3.6   CL 99   CO2 34*   AGAP 6   BUN 15   CREATININE 0.83   CALCIUM 9.3       Coags    No recent results     Additional Electrolytes  No recent results       Blood Gas    No recent results  No recent results LFTs  No recent results    Infectious  No recent results  Glucose  Recent Labs     08/31/23  0535   GLUC 509 [address]-

## 2023-09-02 NOTE — ASSESSMENT & PLAN NOTE
Patient initially presented to [facility] on 8/23 with hypoxia and encephalopathy. Acute change in GCS prompted rapid response and subsequent intubation for airway protection. Of note, pt w/ recent hospitalization to [facility] 8/11-8/17 for encephalopathy with unclear etiology despite extensive workup but Wernicke's encephalopathy was on the differential.    ·  [place] workup 8/11:  · Rapid response and intubation on 8/12 for airway protection, extubated on 8/13  · CT head 8/11: No acute intracranial abnormality. Stable small bilateral subcortical hypoattenuating foci. No associated mass effect. · MRI 8/12: No acute intracranial abnormality. Stable nonspecific enhancement along the anterior floor of the third ventricle/hypothalamus compared to March 2023 study. Findings below:  · Differentials includes chronic toxic metabolic insult (thiamine deficiency), chronic inflammatory/infectious conditions  · Other considerations include Langerhans cell cell histiocytosis, lymphocytic hypophysitis, and neurosyphilis. · Primary CNS lymphoma less likely consideration, given the lack of interval change. Serologic correlation is recommended. Stable cerebral white matter signal abnormality, presumably the sequela of chronic microangiopathic disease given the patient's history  · VEEG 8/12: negative for seizure activity. Possible findings relating to underlying sleep disorder   · LP: Grossly negative overall  · Meningitis/encephalitis panel negative, bacterial/fungal culture and gram negative, lyme and cryptococcal negative  · Paraneoplastic panel neg  · Unable to obtain CSF VDRL  · DSPO: Treated with high dose thiamine. Psych evaluated, possible bipolar disorder. Discharged to home with his sister with visiting nurses, speech therapy, PT/OT  · [facility] ([place] 8/23: Presented with hypoxia found by visiting nurse SPO2 in 80's  · 8/24: Patient's girlfriend visited [facility] and found him unresponsive.  Notes said that 'eyes were rolled back with secretions around his mouth"  · Rapid response called and intubated for hypoxia and low GCS. VBG ? Post intubation 7.25/76/59/33  · Transferred to [facility] on 8/24  · [facility] 8/24 (Current hospital course)  · CT head 8/24: No acute intracranial abnormality  · 8/25 EEG: No evidence of electrographic seizures. Mild background slowing suggestive of underlying cerebral dysfunction from toxic/metablic/infectious/hypoxic encephalopathy. Clinical correlation is recommended  · On neurological exam: wakes to voice, weakly follows one step commands with all extremities   · Continues to be weak off sedation    Plan:  · Encephalopathy likely multifactorial in the setting of abnormal (however stable) MRI showing possible wernicke's encephalopathy, hypercarbia, hyponatremia  · Neurology consulted, recommendations as follows:  · Somnolence has been going on for a while, some neuro symptoms after chemo in march. · Follow up Ach R Ab  · Serum anti-TPO and DARIN Ab's, RPR, neg. CSF paraneoplastic panel neg. Unable to obtain CSF VDRL  · Start Ceftriaxone 9/1 to cover for possible neurosyphillis for 7 more days  · Repeat LP next week if not better by Monday  · Psych input? · Recommend BiPAP vs CPAP HS while inpatient once extubated.  Patient would benefit from outpatient PFTs and possible CPAP HS  · Continue thiamine  · CAM ICU  · Sleep hygiene  · Neurochecks per routine

## 2023-09-03 LAB
ANION GAP SERPL CALCULATED.3IONS-SCNC: 4 MMOL/L
BUN SERPL-MCNC: 18 MG/DL (ref 5–25)
CALCIUM SERPL-MCNC: 9.1 MG/DL (ref 8.4–10.2)
CHLORIDE SERPL-SCNC: 105 MMOL/L (ref 96–108)
CO2 SERPL-SCNC: 32 MMOL/L (ref 21–32)
CREAT SERPL-MCNC: 0.75 MG/DL (ref 0.6–1.3)
ERYTHROCYTE [DISTWIDTH] IN BLOOD BY AUTOMATED COUNT: 15.1 % (ref 11.6–15.1)
GFR SERPL CREATININE-BSD FRML MDRD: 118 ML/MIN/1.73SQ M
GLUCOSE SERPL-MCNC: 89 MG/DL (ref 65–140)
HCT VFR BLD AUTO: 35.8 % (ref 36.5–49.3)
HGB BLD-MCNC: 11.2 G/DL (ref 12–17)
MCH RBC QN AUTO: 31.3 PG (ref 26.8–34.3)
MCHC RBC AUTO-ENTMCNC: 31.3 G/DL (ref 31.4–37.4)
MCV RBC AUTO: 100 FL (ref 82–98)
PLATELET # BLD AUTO: 272 THOUSANDS/UL (ref 149–390)
PMV BLD AUTO: 9.2 FL (ref 8.9–12.7)
POTASSIUM SERPL-SCNC: 4 MMOL/L (ref 3.5–5.3)
RBC # BLD AUTO: 3.58 MILLION/UL (ref 3.88–5.62)
SODIUM SERPL-SCNC: 141 MMOL/L (ref 135–147)
WBC # BLD AUTO: 8.14 THOUSAND/UL (ref 4.31–10.16)

## 2023-09-03 PROCEDURE — 87070 CULTURE OTHR SPECIMN AEROBIC: CPT | Performed by: NURSE PRACTITIONER

## 2023-09-03 PROCEDURE — 94669 MECHANICAL CHEST WALL OSCILL: CPT

## 2023-09-03 PROCEDURE — 94760 N-INVAS EAR/PLS OXIMETRY 1: CPT

## 2023-09-03 PROCEDURE — 99232 SBSQ HOSP IP/OBS MODERATE 35: CPT | Performed by: INTERNAL MEDICINE

## 2023-09-03 PROCEDURE — 94640 AIRWAY INHALATION TREATMENT: CPT

## 2023-09-03 PROCEDURE — 80048 BASIC METABOLIC PNL TOTAL CA: CPT | Performed by: NURSE PRACTITIONER

## 2023-09-03 PROCEDURE — 94003 VENT MGMT INPAT SUBQ DAY: CPT

## 2023-09-03 PROCEDURE — 94668 MNPJ CHEST WALL SBSQ: CPT

## 2023-09-03 PROCEDURE — 85027 COMPLETE CBC AUTOMATED: CPT | Performed by: NURSE PRACTITIONER

## 2023-09-03 PROCEDURE — 94150 VITAL CAPACITY TEST: CPT

## 2023-09-03 PROCEDURE — 87205 SMEAR GRAM STAIN: CPT | Performed by: NURSE PRACTITIONER

## 2023-09-03 RX ADMIN — CHLORHEXIDINE GLUCONATE 15 ML: 1.2 RINSE ORAL at 08:09

## 2023-09-03 RX ADMIN — FENTANYL CITRATE 50 MCG: 50 INJECTION, SOLUTION INTRAMUSCULAR; INTRAVENOUS at 10:52

## 2023-09-03 RX ADMIN — ALBUTEROL SULFATE 2.5 MG: 2.5 SOLUTION RESPIRATORY (INHALATION) at 13:45

## 2023-09-03 RX ADMIN — CEFTRIAXONE 2000 MG: 2 INJECTION, SOLUTION INTRAVENOUS at 11:45

## 2023-09-03 RX ADMIN — ALBUTEROL SULFATE 2.5 MG: 2.5 SOLUTION RESPIRATORY (INHALATION) at 19:35

## 2023-09-03 RX ADMIN — ALBUTEROL SULFATE 2.5 MG: 2.5 SOLUTION RESPIRATORY (INHALATION) at 02:48

## 2023-09-03 RX ADMIN — Medication 20 MG: at 08:11

## 2023-09-03 RX ADMIN — FLUOXETINE 10 MG: 10 CAPSULE ORAL at 08:09

## 2023-09-03 RX ADMIN — SODIUM CHLORIDE SOLN NEBU 3% 4 ML: 3 NEBU SOLN at 13:46

## 2023-09-03 RX ADMIN — ENOXAPARIN SODIUM 135 MG: 150 INJECTION SUBCUTANEOUS at 20:38

## 2023-09-03 RX ADMIN — FENTANYL CITRATE 50 MCG: 50 INJECTION, SOLUTION INTRAMUSCULAR; INTRAVENOUS at 04:21

## 2023-09-03 RX ADMIN — ENOXAPARIN SODIUM 135 MG: 150 INJECTION SUBCUTANEOUS at 08:10

## 2023-09-03 RX ADMIN — SODIUM CHLORIDE SOLN NEBU 3% 4 ML: 3 NEBU SOLN at 02:48

## 2023-09-03 RX ADMIN — FENTANYL CITRATE 50 MCG: 50 INJECTION, SOLUTION INTRAMUSCULAR; INTRAVENOUS at 18:37

## 2023-09-03 RX ADMIN — CHLORHEXIDINE GLUCONATE 15 ML: 1.2 RINSE ORAL at 20:38

## 2023-09-03 RX ADMIN — ALBUTEROL SULFATE 2.5 MG: 2.5 SOLUTION RESPIRATORY (INHALATION) at 08:08

## 2023-09-03 RX ADMIN — SODIUM CHLORIDE SOLN NEBU 3% 4 ML: 3 NEBU SOLN at 08:08

## 2023-09-03 RX ADMIN — SODIUM CHLORIDE SOLN NEBU 3% 4 ML: 3 NEBU SOLN at 19:35

## 2023-09-03 RX ADMIN — THIAMINE HCL TAB 100 MG 100 MG: 100 TAB at 08:09

## 2023-09-03 RX ADMIN — ACETAMINOPHEN 650 MG: 650 SUSPENSION ORAL at 10:45

## 2023-09-03 NOTE — ASSESSMENT & PLAN NOTE
Patient initially presented to [facility] on 8/23 with hypoxia and encephalopathy. Acute change in GCS prompted rapid response and subsequent intubation for airway protection. Of note, pt w/ recent hospitalization to [facility] 8/11-8/17 for encephalopathy with unclear etiology despite extensive workup but Wernicke's encephalopathy was on the differential.    ·  [place] workup 8/11:  · Rapid response and intubation on 8/12 for airway protection, extubated on 8/13  · CT head 8/11: No acute intracranial abnormality. Stable small bilateral subcortical hypoattenuating foci. No associated mass effect. · MRI 8/12: No acute intracranial abnormality. Stable nonspecific enhancement along the anterior floor of the third ventricle/hypothalamus compared to March 2023 study. Findings below:  · Differentials includes chronic toxic metabolic insult (thiamine deficiency), chronic inflammatory/infectious conditions  · Other considerations include Langerhans cell cell histiocytosis, lymphocytic hypophysitis, and neurosyphilis. · Primary CNS lymphoma less likely consideration, given the lack of interval change. Serologic correlation is recommended. Stable cerebral white matter signal abnormality, presumably the sequela of chronic microangiopathic disease given the patient's history  · VEEG 8/12: negative for seizure activity. Possible findings relating to underlying sleep disorder   · LP: Grossly negative overall  · Meningitis/encephalitis panel negative, bacterial/fungal culture and gram negative, lyme and cryptococcal negative  · Paraneoplastic panel neg  · Unable to obtain CSF VDRL  · DSPO: Treated with high dose thiamine. Psych evaluated, possible bipolar disorder. Discharged to home with his sister with visiting nurses, speech therapy, PT/OT  ·  [facility] 8/23: Presented with hypoxia found by visiting nurse SPO2 in 80's  · 8/24: Patient's girlfriend visited [facility] and found him unresponsive.  Notes said that 'eyes were rolled back with secretions around his mouth"  · Rapid response called and intubated for hypoxia and low GCS. VBG ? Post intubation 7.25/76/59/33  · Transferred to [facility] on 8/24  · [facility] 8/24 (Current hospital course)  · CT head 8/24: No acute intracranial abnormality  · 8/25 EEG: No evidence of electrographic seizures. Mild background slowing suggestive of underlying cerebral dysfunction from toxic/metablic/infectious/hypoxic encephalopathy. Clinical correlation is recommended  · On neurological exam: wakes to voice, weakly follows one step commands with all extremities   · Continues to be weak off sedation    Plan:  · Encephalopathy likely multifactorial in the setting of abnormal (however stable) MRI showing possible wernicke's encephalopathy, hypercarbia, hyponatremia  · Neurology consulted, recommendations as follows:  · Somnolence has been going on for a while, some neuro symptoms after chemo in march. · Ach R Ab negative   · Serum anti-TPO and DARIN Ab's, RPR, neg. CSF paraneoplastic panel neg  · Ceftriaxone started 9/1 to cover for possible neurosyphillis for 7 more days  · Tentative plan for repeat LP on Tuesday- Will need to hold lovenox   · Psych input? · Recommend BiPAP vs CPAP HS while inpatient once extubated.  Patient would benefit from outpatient PFTs and possible CPAP HS  · Continue thiamine  · CAM ICU  · Sleep hygiene  · Neurochecks per routine

## 2023-09-03 NOTE — ASSESSMENT & PLAN NOTE
Hx of hypertension.  PTA was on Torsemide 20 mg daily  · Echo this admission showed EF 60%, normal LV thickness, no systolic or diastolic dysfunction  · Hypotension s/p intubation initially requiring Levophed DC'ed on 8/27  · BP's now stable MAP 80s-100s

## 2023-09-03 NOTE — RAPID RESPONSE
Pt recd on a pb 840 vent setting as per flow sheet all alarms on and function vent working properly.

## 2023-09-03 NOTE — ASSESSMENT & PLAN NOTE
· Continue Fluoxetine 10 mg daily  · Evaluated by psychiatry last admission, felt to have depressive mood disorder  · Possibly contributing to weakness on vent despite being off sedation  · Repeat psych evaluation, if able considering patient is intubated

## 2023-09-03 NOTE — ASSESSMENT & PLAN NOTE
Presented with hypoxia, initially admitted to the floor on supplemental NC  · CTA PE: Pulmonary embolism in the right lower lobe segmental pulmonary artery. Measured RV/LV ratio is within normal limits at less than 0.9. · BNP 9, initial HS troponin 9  · Initiated on heparin gtt, transitioned to therapeutic Lovenox on 8/26  · Developed worsening hypoxia 8/24, rapid response was called and patient was intubated for airway protection and hypoxic  · Now requiring high vent setting  · Echo completed 8/25: Left Ventricle: Left ventricular cavity size is normal. Wall thickness is normal. The left ventricular ejection fraction is 60% by visual estimation. . Wall motion is normal. Diastolic function is normal for age. Right Ventricle: Right ventricular cavity size is normal. Systolic function is normal. Normal tricuspid annular plane systolic excursion (TAPSE) > 1.7 cm.     Plan:  · Heparin converted to therapeutic lovenox on 8/26  · Continue Lovenox for now given possible trach/PEG next week  · Will hold Lovenox on Monday for possible LP next on Tuesday   · Will need transition to [address] on DC for 3 months  · Dopplers of LE neg for DVT bilaterally

## 2023-09-03 NOTE — ASSESSMENT & PLAN NOTE
· Testicular cancer s/p left orchiectomy on 12/2022  · Repeat CT scan in January 2023 with enlarging lymph node.   · Began Chemo in March 2023: Etoposide and Cisplatin with plan for 4 cycles, 5 days every 21 days  · Did not complete chemo treatment due to adverse effects  · Stopped Cisplatin after 1 cycle due to painful neuropathy  · Continued Etoposide 2nd cycle  · Resumed Cisplatin and Etoposide 3rd cycle  · After 3rd cycle reported double vision, labile affect and right sided weakness and therefore chemotherapy was stopped  · Stopped chemo on 5/26/23  · MRI demonstrated white matter changes at that time and he was referred to Neurology   · Continue outpatient follow up with Heme/Onc

## 2023-09-03 NOTE — PROGRESS NOTES
[id number] [facility]  Progress Note  Name: Valerie Ortiz  MRN: [id number]  Unit/Bed#: ICU 03 I Date of Admission: 8/24/2023   Date of Service: 9/3/2023 I Hospital Day: 10    Assessment/Plan   * Acute respiratory failure with hypoxia [facility])  Assessment & Plan  Patient presented with hypoxia, found to have right lower lobe segmental PE. Initially saturating well on nasal cannula. Rapid response was called 8/24 secondary to hypoxia and altered mental status requiring emergent intubation. Hypoxic post intubation requiring high ventilator settings. · CTA PE study 8/23: Pulmonary embolism in the right lower lobe segmental pulmonary artery. Measured RV/LV ratio is within normal limits at less than 0.9.  · Initial CXR post intubation: Near complete opacity of the left lung likely related to worsening infiltrate and/or atelectasis. Patchy right upper lobe infiltrates. · Strep pneumo/legionella urine antigen negative  · COVID/Flu/RSV negative  · 8/25: Bronchoscopy: poorly tolerated secondary to hypoxia. Some mucus plugging present on the left. · 8/26: CT chest: Complete right lower lobe and near complete left lower lobe atelectasis. Pneumonia not excluded in the appropriate clinical setting. Mild interstitial edema with trace effusions  · Received diuresis with IV Lasix   · Completed Zosyn x 7 days (8/25-8/31) - MRSA nares neg  · Tolerating trials of pressure support 15/6 but requiring full vent support HS secondary to tachypnea    Plan:  · Current vent settings: AC/VC 14/500/40/6  · Tolerated 4-5 hours of PS 15/6, yesterday  · Day 11 on Vent  · Continues to be weak. Unclear if muscular etiology vs depression contributing to weakness.  Patient encouraged to move extremities during the day when possible to build up strength for extubation  · NIF improving to -37  · Goals of care discussion on 8/30 with Dr. Nikolai Mcgovern, mother, and patient at bedside  · Mother and patient explained about options for tracheostomy and PEG tube if no improvement on vent  · Patient expressed agreement with potential interventions with head nodding and agreeable to mother helping to make medical decisions  · Trach/PEG planned for Tuesday   · Wean Fio2 for goal Sp02 >90  · Continue Chest PT, hypertonic saline nebs given thick secretions, Pulmonary hygiene  · VAP ppx    Toxic metabolic encephalopathy  Assessment & Plan  [facility] to [facility] with hypoxia and encephalopathy. Acute change in GCS prompted rapid response and subsequent intubation for airway protection. Of note, pt w/ recent hospitalization to [facility] 8/11-8/17 for encephalopathy with unclear etiology despite extensive workup but Wernicke's encephalopathy was on the differential.    · [facility] workup 8/11:  · Rapid response and intubation on 8/12 for airway protection, extubated on 8/13  · CT head 8/11: No acute intracranial abnormality. Stable small bilateral subcortical hypoattenuating foci. No associated mass effect. · MRI 8/12: No acute intracranial abnormality. Stable nonspecific enhancement along the anterior floor of the third ventricle/hypothalamus compared to March 2023 study. Findings below:  · Differentials includes chronic toxic metabolic insult (thiamine deficiency), chronic inflammatory/infectious conditions  · Other considerations include Langerhans cell cell histiocytosis, lymphocytic hypophysitis, and neurosyphilis. · Primary CNS lymphoma less likely consideration, given the lack of interval change. Serologic correlation is recommended. Stable cerebral white matter signal abnormality, presumably the sequela of chronic microangiopathic disease given the patient's history  · VEEG 8/12: negative for seizure activity.  Possible findings relating to underlying sleep disorder   · LP: Grossly negative overall  · Meningitis/encephalitis panel negative, bacterial/fungal culture and gram negative, lyme and cryptococcal negative  · Paraneoplastic panel neg  · Unable to obtain CSF VDRL  · DSPO: Treated with high dose thiamine. Psych evaluated, possible bipolar disorder. Discharged to home with his sister with visiting nurses, speech therapy, PT/OT  · [facility] ([place] 8/23: Presented with hypoxia found by visiting nurse SPO2 in 80's  · 8/24: Patient's girlfriend visited [address] and found him unresponsive. Notes said that 'eyes were rolled back with secretions around his mouth"  · Rapid response called and intubated for hypoxia and low GCS. VBG ? Post intubation 7.25/76/59/33  · Transferred to [facility] on 8/24  · [facility] [employer] [facility] 8/24 (Current hospital course)  · CT head 8/24: No acute intracranial abnormality  · 8/25 EEG: No evidence of electrographic seizures. Mild background slowing suggestive of underlying cerebral dysfunction from toxic/metablic/infectious/hypoxic encephalopathy. Clinical correlation is recommended  · On neurological exam: wakes to voice, weakly follows one step commands with all extremities   · Continues to be weak off sedation    Plan:  · Encephalopathy likely multifactorial in the setting of abnormal (however stable) MRI showing possible wernicke's encephalopathy, hypercarbia, hyponatremia  · Neurology consulted, recommendations as follows:  · Somnolence has been going on for a while, some neuro symptoms after chemo in march. · Ach R Ab negative   · Serum anti-TPO and DARIN Ab's, RPR, neg. CSF paraneoplastic panel neg  · Ceftriaxone started 9/1 to cover for possible neurosyphillis for 7 more days  · Tentative plan for repeat LP on Tuesday- Will need to hold lovenox   · Psych input? · Recommend BiPAP vs CPAP HS while inpatient once extubated.  Patient would benefit from outpatient PFTs and possible CPAP HS  · Continue thiamine  · CAM ICU  · Sleep hygiene  · Neurochecks per routine     Acute pulmonary embolism without acute cor pulmonale ([address])  Assessment & Plan  Presented with hypoxia, initially admitted to the floor on supplemental NC  · CTA PE: Pulmonary embolism in the right lower lobe segmental pulmonary artery. Measured RV/LV ratio is within normal limits at less than 0.9. · BNP 9, initial HS troponin 9  · Initiated on heparin gtt, transitioned to therapeutic Lovenox on 8/26  · Developed worsening hypoxia 8/24, rapid response was called and patient was intubated for airway protection and hypoxic  · Now requiring high vent setting  · Echo completed 8/25: Left Ventricle: Left ventricular cavity size is normal. Wall thickness is normal. The left ventricular ejection fraction is 60% by visual estimation. . Wall motion is normal. Diastolic function is normal for age. Right Ventricle: Right ventricular cavity size is normal. Systolic function is normal. Normal tricuspid annular plane systolic excursion (TAPSE) > 1.7 cm. Plan:  · Heparin converted to therapeutic lovenox on 8/26  · Continue Lovenox for now given possible trach/PEG next week  · Will hold Lovenox on Monday for possible LP next on Tuesday   · Will need transition to [address] on DC for 3 months  · Dopplers of LE neg for DVT bilaterally    History of LVH (left ventricular hypertrophy)  Assessment & Plan  Previous echocardiogram 6/2/23 showed EF 55%, normal diastolic dysfunction, mild left ventricular hypertrophy, mild RVSP elevation and mild TR  · PTA Med: Torsemide 20 mg daily  · Evaluated by Heart Failure team in July 2023.  LVH thought to be secondary to hypertension, obesity, hx of meth use  · Recommended sleep study at that time   · Echo this admission shows normal EF of 60%, normal LV thickness, no diastolic dysfunction    Depression  Assessment & Plan  · Continue Fluoxetine 10 mg daily  · Evaluated by psychiatry last admission, felt to have depressive mood disorder  · Possibly contributing to weakness on vent despite being off sedation  · Repeat psych evaluation, if able considering patient is intubated    Seminoma of left testis [facility])  Assessment & Plan  · Testicular cancer s/p left orchiectomy on 12/2022  · Repeat CT scan in January 2023 with enlarging lymph node. · Began Chemo in March 2023: Etoposide and Cisplatin with plan for 4 cycles, 5 days every 21 days  · Did not complete chemo treatment due to adverse effects  · Stopped Cisplatin after 1 cycle due to painful neuropathy  · Continued Etoposide 2nd cycle  · Resumed Cisplatin and Etoposide 3rd cycle  · After 3rd cycle reported double vision, labile affect and right sided weakness and therefore chemotherapy was stopped  · Stopped chemo on 5/26/23  · MRI demonstrated white matter changes at that time and he was referred to Neurology   · Continue outpatient follow up with Heme/Onc     Hypertension  Assessment & Plan  Hx of hypertension. PTA was on Torsemide 20 mg daily  · Echo this admission showed EF 60%, normal LV thickness, no systolic or diastolic dysfunction  · Hypotension s/p intubation initially requiring Levophed DC'ed on 8/27  · BP's now stable MAP [id number]    Umbilical hernia without obstruction and without gangrene  Assessment & Plan  · Umbilical hernia present, easily reducible              ICU Core Measures     Vented Patient  VAP Bundle  VAP bundle ordered     A: Assess, Prevent, and Manage Pain · Has pain been assessed? Yes  · Need for changes to pain regimen? No   B: Both Spontaneous Awakening Trials (SATs) and Spontaneous Breathing Trials (SBTs) · Plan to perform spontaneous awakening trial today? Yes   · Plan to perform spontaneous breathing trial today? Yes   · Obvious barriers to extubation? Yes   C: Choice of Sedation · RASS Goal: N/A patient not on sedation  · Need for changes to sedation or analgesia regimen? NA   D: Delirium · CAM-ICU: Negative   E: Early Mobility  · Plan for early mobility? Yes   F: Family Engagement · Plan for family engagement today? Yes       Antibiotic Review: Awaiting culture results.      Review of Invasive Devices:      Central access plan: port in place      Prophylaxis:  VTE VTE covered by:  enoxaparin, Subcutaneous, 135 mg at 23       Stress Ulcer  covered byomeprazole (PRILOSEC) suspension 2 mg/mL [[id number]]       Subjective   HPI/24hr events: Ceftriaxone D2 for empiric treatment for possible neurosyphilis. Tolerated 4 hours of PS yesterday afternoon on PS of 15/6, 40%. Placed on AC/VC overnight. No acute events overnight. Review of Systems   Unable to perform ROS: Intubated          Objective                            Vitals I/O      Most Recent Min/Max in 24hrs   Temp 97.7 °F (36.5 °C) Temp  Min: 97.7 °F (36.5 °C)  Max: 101.7 °F (38.7 °C)   Pulse 79 Pulse  Min: 76  Max: 105   Resp 17 Resp  Min: 14  Max: 30   /73 BP  Min: 99/69  Max: 142/90   O2 Sat 99 % SpO2  Min: 94 %  Max: 99 %      Intake/Output Summary (Last 24 hours) at 9/3/2023 0026  Last data filed at 9/3/2023 0000  Gross per 24 hour   Intake 2665 ml   Output 900 ml   Net 1765 ml         Diet Enteral/Parenteral; Tube Feeding No Oral Diet; Jevity 1.2 Scott; Continuous; 70; Prosource Protein Liquid - One Packet Daily; 140; Water; Every 4 hours     Invasive Monitoring Physical exam    Physical Exam  Eyes:      Extraocular Movements: Extraocular movements intact. Pupils: Pupils are equal, round, and reactive to light. Skin:     General: Skin is warm. HENT:      Head: Normocephalic and atraumatic. Mouth/Throat:      Mouth: Mucous membranes are moist.   Cardiovascular:      Rate and Rhythm: Normal rate and regular rhythm. Musculoskeletal:         General: Normal range of motion. Right lower le+ Edema present. Left lower le+ Edema present. Comments: Very weakly holds extremities up against gravity  and Bilateral pedal edema, R>L   Abdominal:      General: Abdomen is protuberant. Bowel sounds are normal.      Palpations: Abdomen is soft. Tenderness: There is no abdominal tenderness. Constitutional:       General: He is not in acute distress.      Appearance: He is well-developed and well-nourished. He is not toxic-appearing. Interventions: He is intubated. Pulmonary:      Effort: No accessory muscle usage, respiratory distress or accessory muscle usage. He is intubated. Breath sounds: Decreased air movement present. Examination of the right-middle field reveals decreased breath sounds. Examination of the left-middle field reveals decreased breath sounds. Examination of the right-lower field reveals decreased breath sounds. Examination of the left-lower field reveals decreased breath sounds. Decreased breath sounds present. No wheezing, rhonchi or rales. Comments: Poor inspiratory effort   Neurological:      General: No focal deficit present. Mental Status: He is alert. Motor: Weakness. Diagnostic Studies      EKG:  Imaging:  I have personally reviewed pertinent reports.    and I have personally reviewed pertinent films in PACS     Medications:  Scheduled PRN   albuterol, 2.5 mg, Q6H  cefTRIAXone, 2,000 mg, Q24H  chlorhexidine, 15 mL, Q12H ISAEL  enoxaparin, 1 mg/kg, Q12H [facility]  FLUoxetine, 10 mg, Daily  omeprazole (PRILOSEC) suspension 2 mg/mL, 20 mg, Daily  sodium chloride, 4 mL, Q6H  thiamine, 100 mg, Daily      acetaminophen, 650 mg, Q4H PRN  fentanyl citrate (PF), 50 mcg, Q1H PRN  polyethylene glycol, 17 g, Daily PRN  senna, 17.6 mg, Daily PRN       Continuous          Labs:    CBC    Recent Labs     09/02/23 0522   WBC 8.20   HGB 11.3*   HCT 36.9        BMP    Recent Labs     09/02/23 0522   SODIUM 142   K 4.0      CO2 32   AGAP 6   BUN 17   CREATININE 0.86   CALCIUM 9.2       Coags    No recent results     Additional Electrolytes  Recent Labs     09/02/23 0522   MG 2.3   PHOS 4.1          Blood Gas    No recent results  No recent results LFTs  No recent results    Infectious  No recent results  Glucose  Recent Labs     09/02/23  0522   GLUC 111 [facility]

## 2023-09-03 NOTE — ASSESSMENT & PLAN NOTE
Previous echocardiogram 6/2/23 showed EF 07%, normal diastolic dysfunction, mild left ventricular hypertrophy, mild RVSP elevation and mild TR  · PTA Med: Torsemide 20 mg daily  · Evaluated by Heart Failure team in July 2023.  LVH thought to be secondary to hypertension, obesity, hx of meth use  · Recommended sleep study at that time   · Echo this admission shows normal EF of 60%, normal LV thickness, no diastolic dysfunction

## 2023-09-03 NOTE — ASSESSMENT & PLAN NOTE
Patient presented with hypoxia, found to have right lower lobe segmental PE. Initially saturating well on nasal cannula. Rapid response was called 8/24 secondary to hypoxia and altered mental status requiring emergent intubation. Hypoxic post intubation requiring high ventilator settings. · CTA PE study 8/23: Pulmonary embolism in the right lower lobe segmental pulmonary artery. Measured RV/LV ratio is within normal limits at less than 0.9.  · Initial CXR post intubation: Near complete opacity of the left lung likely related to worsening infiltrate and/or atelectasis. Patchy right upper lobe infiltrates. · Strep pneumo/legionella urine antigen negative  · COVID/Flu/RSV negative  · 8/25: Bronchoscopy: poorly tolerated secondary to hypoxia. Some mucus plugging present on the left. · 8/26: CT chest: Complete right lower lobe and near complete left lower lobe atelectasis. Pneumonia not excluded in the appropriate clinical setting. Mild interstitial edema with trace effusions  · Received diuresis with IV Lasix   · Completed Zosyn x 7 days (8/25-8/31) - MRSA nares neg  · Tolerating trials of pressure support 15/6 but requiring full vent support HS secondary to tachypnea    Plan:  · Current vent settings: AC/VC 14/500/40/6  · Tolerated 4-5 hours of PS 15/6, yesterday  · Day 11 on Vent  · Continues to be weak. Unclear if muscular etiology vs depression contributing to weakness.  Patient encouraged to move extremities during the day when possible to build up strength for extubation  · NIF improving to -37  · Goals of care discussion on 8/30 with Dr. Tiburcio Sharma, mother, and patient at bedside  · Mother and patient explained about options for tracheostomy and PEG tube if no improvement on vent  · Patient expressed agreement with potential interventions with head nodding and agreeable to mother helping to make medical decisions  · Trach/PEG planned for Tuesday   · Wean Fio2 for goal Sp02 >90  · Continue Chest PT, hypertonic saline nebs given thick secretions, Pulmonary hygiene  · VAP ppx

## 2023-09-04 ENCOUNTER — APPOINTMENT (OUTPATIENT)
Dept: RADIOLOGY | Facility: HOSPITAL | Age: 38
DRG: 005 | End: 2023-09-04
Payer: COMMERCIAL

## 2023-09-04 PROCEDURE — 94003 VENT MGMT INPAT SUBQ DAY: CPT

## 2023-09-04 PROCEDURE — 94150 VITAL CAPACITY TEST: CPT

## 2023-09-04 PROCEDURE — 94760 N-INVAS EAR/PLS OXIMETRY 1: CPT

## 2023-09-04 PROCEDURE — 94669 MECHANICAL CHEST WALL OSCILL: CPT

## 2023-09-04 PROCEDURE — 94668 MNPJ CHEST WALL SBSQ: CPT

## 2023-09-04 PROCEDURE — 99232 SBSQ HOSP IP/OBS MODERATE 35: CPT | Performed by: INTERNAL MEDICINE

## 2023-09-04 PROCEDURE — 94640 AIRWAY INHALATION TREATMENT: CPT

## 2023-09-04 PROCEDURE — 71045 X-RAY EXAM CHEST 1 VIEW: CPT

## 2023-09-04 RX ADMIN — ENOXAPARIN SODIUM 135 MG: 150 INJECTION SUBCUTANEOUS at 21:37

## 2023-09-04 RX ADMIN — THIAMINE HCL TAB 100 MG 100 MG: 100 TAB at 08:23

## 2023-09-04 RX ADMIN — ALBUTEROL SULFATE 2.5 MG: 2.5 SOLUTION RESPIRATORY (INHALATION) at 07:56

## 2023-09-04 RX ADMIN — SODIUM CHLORIDE SOLN NEBU 3% 4 ML: 3 NEBU SOLN at 01:06

## 2023-09-04 RX ADMIN — FENTANYL CITRATE 50 MCG: 50 INJECTION, SOLUTION INTRAMUSCULAR; INTRAVENOUS at 18:25

## 2023-09-04 RX ADMIN — ENOXAPARIN SODIUM 135 MG: 150 INJECTION SUBCUTANEOUS at 08:24

## 2023-09-04 RX ADMIN — FENTANYL CITRATE 50 MCG: 50 INJECTION, SOLUTION INTRAMUSCULAR; INTRAVENOUS at 23:56

## 2023-09-04 RX ADMIN — ALBUTEROL SULFATE 2.5 MG: 2.5 SOLUTION RESPIRATORY (INHALATION) at 13:47

## 2023-09-04 RX ADMIN — SODIUM CHLORIDE SOLN NEBU 3% 4 ML: 3 NEBU SOLN at 07:56

## 2023-09-04 RX ADMIN — Medication 20 MG: at 10:00

## 2023-09-04 RX ADMIN — ALBUTEROL SULFATE 2.5 MG: 2.5 SOLUTION RESPIRATORY (INHALATION) at 01:06

## 2023-09-04 RX ADMIN — CHLORHEXIDINE GLUCONATE 15 ML: 1.2 RINSE ORAL at 21:38

## 2023-09-04 RX ADMIN — FLUOXETINE 10 MG: 10 CAPSULE ORAL at 08:24

## 2023-09-04 RX ADMIN — SODIUM CHLORIDE SOLN NEBU 3% 4 ML: 3 NEBU SOLN at 13:47

## 2023-09-04 RX ADMIN — SODIUM CHLORIDE SOLN NEBU 3% 4 ML: 3 NEBU SOLN at 19:33

## 2023-09-04 RX ADMIN — ALBUTEROL SULFATE 2.5 MG: 2.5 SOLUTION RESPIRATORY (INHALATION) at 19:33

## 2023-09-04 RX ADMIN — CHLORHEXIDINE GLUCONATE 15 ML: 1.2 RINSE ORAL at 08:23

## 2023-09-04 RX ADMIN — CEFTRIAXONE 2000 MG: 2 INJECTION, SOLUTION INTRAVENOUS at 12:18

## 2023-09-04 NOTE — ASSESSMENT & PLAN NOTE
Patient initially presented to [facility] on 8/23 with hypoxia and encephalopathy. Acute change in GCS prompted rapid response and subsequent intubation for airway protection. Of note, pt w/ recent hospitalization to [facility] 8/11-8/17 for encephalopathy with unclear etiology despite extensive workup but Wernicke's encephalopathy was on the differential.    ·  [place] workup 8/11:  · Rapid response and intubation on 8/12 for airway protection, extubated on 8/13  · CT head 8/11: No acute intracranial abnormality. Stable small bilateral subcortical hypoattenuating foci. No associated mass effect. · MRI 8/12: No acute intracranial abnormality. Stable nonspecific enhancement along the anterior floor of the third ventricle/hypothalamus compared to March 2023 study. Findings below:  · Differentials includes chronic toxic metabolic insult (thiamine deficiency), chronic inflammatory/infectious conditions  · Other considerations include Langerhans cell cell histiocytosis, lymphocytic hypophysitis, and neurosyphilis. · Primary CNS lymphoma less likely consideration, given the lack of interval change. Serologic correlation is recommended. Stable cerebral white matter signal abnormality, presumably the sequela of chronic microangiopathic disease given the patient's history  · VEEG 8/12: negative for seizure activity. Possible findings relating to underlying sleep disorder   · LP: Grossly negative overall  · Meningitis/encephalitis panel negative, bacterial/fungal culture and gram negative, lyme and cryptococcal negative  · Paraneoplastic panel neg  · Unable to obtain CSF VDRL  · DSPO: Treated with high dose thiamine. Psych evaluated, possible bipolar disorder. Discharged to home with his sister with visiting nurses, speech therapy, PT/OT  · [facility] ([place]) 8/23: Presented with hypoxia found by visiting nurse SPO2 in 80's  · 8/24: Patient's girlfriend visited [facility] and found him unresponsive.  Notes said that 'eyes were rolled back with secretions around his mouth"  · Rapid response called and intubated for hypoxia and low GCS. VBG ? Post intubation 7.25/76/59/33  · Transferred to [facility] on 8/24  ·  [facility] 8/24 (Current hospital course)  · CT head 8/24: No acute intracranial abnormality  · 8/25 EEG: No evidence of electrographic seizures. Mild background slowing suggestive of underlying cerebral dysfunction from toxic/metablic/infectious/hypoxic encephalopathy. Clinical correlation is recommended  · On neurological exam: wakes to voice, weakly follows one step commands with all extremities   · Continues to be weak off sedation    Plan:  · Acute Encephalopathy on admission likely multifactorial in the setting of abnormal (however stable) MRI showing possible wernicke's encephalopathy, hypercarbia, hyponatremia  · Mentation now improved but remains with significant generalized weakness   · Neurology consulted, recommendations as follows:  · Somnolence has been going on for a while, some neuro symptoms after chemo in march. · Ach R Ab negative   · Serum anti-TPO and DARIN Ab's, RPR, neg. CSF paraneoplastic panel neg  · Ceftriaxone started 9/1 to cover for possible neurosyphillis for 7 more days  · Tentative plan for repeat LP on Tuesday- Will need to hold lovenox   · Psych input? · Recommend BiPAP vs CPAP HS while inpatient once extubated.  Patient would benefit from outpatient PFTs and possible CPAP HS  · Continue thiamine  · CAM ICU  · Sleep hygiene  · Neurochecks per routine

## 2023-09-04 NOTE — ASSESSMENT & PLAN NOTE
Patient presented with hypoxia, found to have right lower lobe segmental PE. Initially saturating well on nasal cannula. Rapid response was called 8/24 secondary to hypoxia and altered mental status requiring emergent intubation. Hypoxic post intubation requiring high ventilator settings. · CTA PE study 8/23: Pulmonary embolism in the right lower lobe segmental pulmonary artery. Measured RV/LV ratio is within normal limits at less than 0.9.  · Initial CXR post intubation: Near complete opacity of the left lung likely related to worsening infiltrate and/or atelectasis. Patchy right upper lobe infiltrates. · Strep pneumo/legionella urine antigen negative  · COVID/Flu/RSV negative  · 8/25: Bronchoscopy: poorly tolerated secondary to hypoxia. Some mucus plugging present on the left. · 8/26: CT chest: Complete right lower lobe and near complete left lower lobe atelectasis. Pneumonia not excluded in the appropriate clinical setting. Mild interstitial edema with trace effusions  · Received diuresis with IV Lasix   · Completed Zosyn x 7 days (8/25-8/31) - MRSA nares neg  · Tolerating 2-4 hour trials of pressure support 15/6 but requiring full vent support HS secondary to tachypnea    Plan:  · Current vent settings: AC/VC 14/500/40/6  · Day 12 on Vent  · Continues to be weak. Unclear if muscular etiology vs depression contributing to weakness.  Patient encouraged to move extremities during the day when possible to build up strength for extubation  · NIF -25 yesterday   · Goals of care discussion on 8/30 with Dr. Darlene Lamar, mother, and patient at bedside  · Mother and patient explained about options for tracheostomy and PEG tube if no improvement on vent  · Patient expressed agreement with potential interventions with head nodding and agreeable to mother helping to make medical decisions  · Trach/PEG planned for Tuesday   · Wean Fio2 for goal Sp02 >90  · Continue pulmonary hygiene   · VAP ppx

## 2023-09-04 NOTE — PROGRESS NOTES
[id number] [facility]  Progress Note  Name: Jorje Conklin  MRN: [id number]  Unit/Bed#: ICU 03 I Date of Admission: 8/24/2023   Date of Service: 9/4/2023 I Hospital Day: 11    Assessment/Plan   * Acute respiratory failure with hypoxia [facility])  Assessment & Plan  Patient presented with hypoxia, found to have right lower lobe segmental PE. Initially saturating well on nasal cannula. Rapid response was called 8/24 secondary to hypoxia and altered mental status requiring emergent intubation. Hypoxic post intubation requiring high ventilator settings. · CTA PE study 8/23: Pulmonary embolism in the right lower lobe segmental pulmonary artery. Measured RV/LV ratio is within normal limits at less than 0.9.  · Initial CXR post intubation: Near complete opacity of the left lung likely related to worsening infiltrate and/or atelectasis. Patchy right upper lobe infiltrates. · Strep pneumo/legionella urine antigen negative  · COVID/Flu/RSV negative  · 8/25: Bronchoscopy: poorly tolerated secondary to hypoxia. Some mucus plugging present on the left. · 8/26: CT chest: Complete right lower lobe and near complete left lower lobe atelectasis. Pneumonia not excluded in the appropriate clinical setting. Mild interstitial edema with trace effusions  · Received diuresis with IV Lasix   · Completed Zosyn x 7 days (8/25-8/31) - MRSA nares neg  · Tolerating 2-4 hour trials of pressure support 15/6 but requiring full vent support HS secondary to tachypnea    Plan:  · Current vent settings: AC/VC 14/500/40/6  · Day 12 on Vent  · Continues to be weak. Unclear if muscular etiology vs depression contributing to weakness.  Patient encouraged to move extremities during the day when possible to build up strength for extubation  · NIF -25 yesterday   · Goals of care discussion on 8/30 with Dr. Brown Gutierrez, mother, and patient at bedside  · Mother and patient explained about options for tracheostomy and PEG tube if no improvement on vent  · Patient expressed agreement with potential interventions with head nodding and agreeable to mother helping to make medical decisions  · Trach/PEG planned for Tuesday   · Wean Fio2 for goal Sp02 >90  · Continue pulmonary hygiene   · VAP ppx    Toxic metabolic encephalopathy  Assessment & Plan  Patient initially presented to [facility] on 8/23 with hypoxia and encephalopathy. Acute change in GCS prompted rapid response and subsequent intubation for airway protection. Of note, pt w/ recent hospitalization to B 8/11-8/17 for encephalopathy with unclear etiology despite extensive workup but Wernicke's encephalopathy was on the differential.    · [facility] workup 8/11:  · Rapid response and intubation on 8/12 for airway protection, extubated on 8/13  · CT head 8/11: No acute intracranial abnormality. Stable small bilateral subcortical hypoattenuating foci. No associated mass effect. · MRI 8/12: No acute intracranial abnormality. Stable nonspecific enhancement along the anterior floor of the third ventricle/hypothalamus compared to March 2023 study. Findings below:  · Differentials includes chronic toxic metabolic insult (thiamine deficiency), chronic inflammatory/infectious conditions  · Other considerations include Langerhans cell cell histiocytosis, lymphocytic hypophysitis, and neurosyphilis. · Primary CNS lymphoma less likely consideration, given the lack of interval change. Serologic correlation is recommended. Stable cerebral white matter signal abnormality, presumably the sequela of chronic microangiopathic disease given the patient's history  · VEEG 8/12: negative for seizure activity. Possible findings relating to underlying sleep disorder   · LP: Grossly negative overall  · Meningitis/encephalitis panel negative, bacterial/fungal culture and gram negative, lyme and cryptococcal negative  · Paraneoplastic panel neg  · Unable to obtain CSF VDRL  · DSPO: Treated with high dose thiamine.  Psych evaluated, possible bipolar disorder. Discharged to home with his sister with visiting nurses, speech therapy, PT/OT  ·  Kriss ([place]) 8/23: Presented with hypoxia found by visiting nurse SPO2 in 80's  · 8/24: Patient's girlfriend visited [facility] and found him unresponsive. Notes said that 'eyes were rolled back with secretions around his mouth"  · Rapid response called and intubated for hypoxia and low GCS. VBG ? Post intubation 7.25/76/59/33  · Transferred to [facility] on 8/24  · ASHLEY Moran 8/24 (Current hospital course)  · CT head 8/24: No acute intracranial abnormality  · 8/25 EEG: No evidence of electrographic seizures. Mild background slowing suggestive of underlying cerebral dysfunction from toxic/metablic/infectious/hypoxic encephalopathy. Clinical correlation is recommended  · On neurological exam: wakes to voice, weakly follows one step commands with all extremities   · Continues to be weak off sedation    Plan:  · Acute Encephalopathy on admission likely multifactorial in the setting of abnormal (however stable) MRI showing possible wernicke's encephalopathy, hypercarbia, hyponatremia  · Mentation now improved but remains with significant generalized weakness   · Neurology consulted, recommendations as follows:  · Somnolence has been going on for a while, some neuro symptoms after chemo in march. · Ach R Ab negative   · Serum anti-TPO and DARIN Ab's, RPR, neg. CSF paraneoplastic panel neg  · Ceftriaxone started 9/1 to cover for possible neurosyphillis for 7 more days  · Tentative plan for repeat LP on Tuesday- Will need to hold lovenox   · Psych input? · Recommend BiPAP vs CPAP HS while inpatient once extubated.  Patient would benefit from outpatient PFTs and possible CPAP HS  · Continue thiamine  · CAM ICU  · Sleep hygiene  · Neurochecks per routine     Acute pulmonary embolism without acute cor pulmonale ([address])  Assessment & Plan  Presented with hypoxia, initially admitted to the floor on supplemental NC  · CTA PE: Pulmonary embolism in the right lower lobe segmental pulmonary artery. Measured RV/LV ratio is within normal limits at less than 0.9. · BNP 9, initial HS troponin 9  · Initiated on heparin gtt, transitioned to therapeutic Lovenox on 8/26  · Developed worsening hypoxia 8/24, rapid response was called and patient was intubated for airway protection and hypoxic  · Now requiring high vent setting  · Echo completed 8/25: Left Ventricle: Left ventricular cavity size is normal. Wall thickness is normal. The left ventricular ejection fraction is 60% by visual estimation. . Wall motion is normal. Diastolic function is normal for age. Right Ventricle: Right ventricular cavity size is normal. Systolic function is normal. Normal tricuspid annular plane systolic excursion (TAPSE) > 1.7 cm. Plan:  · Heparin converted to therapeutic lovenox on 8/26  · Continue Lovenox for now given possible trach/PEG next week  · Will hold Lovenox on Monday for possible LP next on Tuesday   · Will need transition to [address] on DC for 3 months  · Dopplers of LE neg for DVT bilaterally    History of LVH (left ventricular hypertrophy)  Assessment & Plan  Previous echocardiogram 6/2/23 showed EF 58%, normal diastolic dysfunction, mild left ventricular hypertrophy, mild RVSP elevation and mild TR  · PTA Med: Torsemide 20 mg daily  · Evaluated by Heart Failure team in July 2023.  LVH thought to be secondary to hypertension, obesity, hx of meth use  · Recommended sleep study at that time   · Echo this admission shows normal EF of 60%, normal LV thickness, no diastolic dysfunction    Depression  Assessment & Plan  · Continue Fluoxetine 10 mg daily  · Evaluated by psychiatry last admission, felt to have depressive mood disorder  · Possibly contributing to weakness on vent despite being off sedation  · Repeat psych evaluation, if able considering patient is intubated    Seminoma of left testis [facility])  Assessment & Plan  · Testicular cancer s/p left orchiectomy on 12/2022  · Repeat CT scan in January 2023 with enlarging lymph node. · Began Chemo in March 2023: Etoposide and Cisplatin with plan for 4 cycles, 5 days every 21 days  · Did not complete chemo treatment due to adverse effects  · Stopped Cisplatin after 1 cycle due to painful neuropathy  · Continued Etoposide 2nd cycle  · Resumed Cisplatin and Etoposide 3rd cycle  · After 3rd cycle reported double vision, labile affect and right sided weakness and therefore chemotherapy was stopped  · Stopped chemo on 5/26/23  · MRI demonstrated white matter changes at that time and he was referred to Neurology   · Continue outpatient follow up with Heme/Onc     Hypertension  Assessment & Plan  Hx of hypertension. PTA was on Torsemide 20 mg daily  · Echo this admission showed EF 60%, normal LV thickness, no systolic or diastolic dysfunction  · Hypotension s/p intubation initially requiring Levophed DC'ed on 8/27  · BP's now stable MAP 34P-146F    Umbilical hernia without obstruction and without gangrene  Assessment & Plan  · Umbilical hernia present, easily reducible              ICU Core Measures     Vented Patient  VAP Bundle  VAP bundle ordered     A: Assess, Prevent, and Manage Pain · Has pain been assessed? Yes  · Need for changes to pain regimen? No   B: Both Spontaneous Awakening Trials (SATs) and Spontaneous Breathing Trials (SBTs) · Plan to perform spontaneous awakening trial today? Yes   · Plan to perform spontaneous breathing trial today? Yes   · Obvious barriers to extubation? Yes   C: Choice of Sedation · RASS Goal: N/A patient not on sedation  · Need for changes to sedation or analgesia regimen? NA   D: Delirium · CAM-ICU: Negative   E: Early Mobility  · Plan for early mobility? Yes   F: Family Engagement · Plan for family engagement today? Yes       Antibiotic Review: Awaiting culture results.      Review of Invasive Devices:      Central access plan: port      Prophylaxis:  VTE VTE covered by:  enoxaparin, Subcutaneous, 135 mg at 09/03/23 2038       Stress Ulcer  covered byomeprazole (PRILOSEC) suspension 2 mg/mL [[id number]]       Subjective   HPI/24hr events: Tolerated PS trial yesterday morning, terminated early due to tachypnea and given a dose of fentanyl for comfort. No acute events overnight. Review of Systems     Objective                            Vitals I/O      Most Recent Min/Max in 24hrs   Temp 98.6 °F (37 °C) Temp  Min: 98.1 °F (36.7 °C)  Max: 101 °F (38.3 °C)   Pulse 85 Pulse  Min: 79  Max: 113   Resp 18 Resp  Min: 15  Max: 36   /71 BP  Min: 96/58  Max: 140/66   O2 Sat 98 % SpO2  Min: 93 %  Max: 100 %      Intake/Output Summary (Last 24 hours) at 9/4/2023 0036  Last data filed at 9/3/2023 1800  Gross per 24 hour   Intake 2700 ml   Output 725 ml   Net 1975 ml         Diet Enteral/Parenteral; Tube Feeding No Oral Diet; Jevity 1.2 Scott; Continuous; 70; Prosource Protein Liquid - One Packet Daily; 140; Water; Every 4 hours     Invasive Monitoring Physical exam    Physical Exam  Eyes:      Extraocular Movements: Extraocular movements intact. Pupils: Pupils are equal, round, and reactive to light. Skin:     General: Skin is warm and dry. HENT:      Head: Normocephalic and atraumatic. Mouth/Throat:      Mouth: Mucous membranes are moist.   Cardiovascular:      Rate and Rhythm: Normal rate and regular rhythm. Musculoskeletal:      Right lower leg: Trace Edema present. Left lower leg: Trace Edema present. Abdominal:      General: Abdomen is protuberant. Hernia: A hernia is present. Hernia is present in the umbilical area. Constitutional:       General: He is not in acute distress. Appearance: He is obese. He is not toxic-appearing. Interventions: He is intubated. Pulmonary:      Effort: No accessory muscle usage, respiratory distress or accessory muscle usage. He is intubated. Breath sounds: Decreased air movement present.  Decreased breath sounds present. No wheezing, rhonchi or rales. Neurological:      Mental Status: He is alert. Comments: Briskly alert, follows commands with all extremities. Significant generalized weakness            Diagnostic Studies      EKG:   Imaging:  I have personally reviewed pertinent reports.    and I have personally reviewed pertinent films in PACS     Medications:  Scheduled PRN   albuterol, 2.5 mg, Q6H  cefTRIAXone, 2,000 mg, Q24H  chlorhexidine, 15 mL, Q12H ISAEL  enoxaparin, 1 mg/kg, Q12H [address]  FLUoxetine, 10 mg, Daily  omeprazole (PRILOSEC) suspension 2 mg/mL, 20 mg, Daily  sodium chloride, 4 mL, Q6H  thiamine, 100 mg, Daily      acetaminophen, 650 mg, Q4H PRN  fentanyl citrate (PF), 50 mcg, Q1H PRN  polyethylene glycol, 17 g, Daily PRN  senna, 17.6 mg, Daily PRN       Continuous          Labs:    CBC    Recent Labs     09/02/23 0522 09/03/23  0517   WBC 8.20 8.14   HGB 11.3* 11.2*   HCT 36.9 35.8*    272     BMP    Recent Labs     09/02/23 0522 09/03/23  0517   SODIUM 142 141   K 4.0 4.0    105   CO2 32 32   AGAP 6 4   BUN 17 18   CREATININE 0.86 0.75   CALCIUM 9.2 9.1       Coags    No recent results     Additional Electrolytes  Recent Labs     09/02/23 0522   MG 2.3   PHOS 4.1          Blood Gas    No recent results  No recent results LFTs  No recent results    Infectious  No recent results  Glucose  Recent Labs     09/02/23 0522 09/03/23  0517   GLUC 88 [id number] [address]

## 2023-09-05 PROBLEM — I50.30 (HFPEF) HEART FAILURE WITH PRESERVED EJECTION FRACTION (HCC): Chronic | Status: RESOLVED | Noted: 2023-07-28 | Resolved: 2023-09-05

## 2023-09-05 PROBLEM — I50.32 CHRONIC DIASTOLIC HEART FAILURE (HCC): Chronic | Status: RESOLVED | Noted: 2023-07-19 | Resolved: 2023-09-05

## 2023-09-05 PROBLEM — R06.2 WHEEZING: Status: RESOLVED | Noted: 2023-08-05 | Resolved: 2023-09-05

## 2023-09-05 LAB
ACHR BIND AB SER-SCNC: <0.03 NMOL/L (ref 0–0.24)
ANION GAP SERPL CALCULATED.3IONS-SCNC: 7 MMOL/L
APTT PPP: 28 SECONDS (ref 23–37)
APTT PPP: 59 SECONDS (ref 23–37)
BACTERIA SPT RESP CULT: NORMAL
BUN SERPL-MCNC: 17 MG/DL (ref 5–25)
CA-I BLD-SCNC: 1.15 MMOL/L (ref 1.12–1.32)
CALCIUM SERPL-MCNC: 9.3 MG/DL (ref 8.4–10.2)
CHLORIDE SERPL-SCNC: 109 MMOL/L (ref 96–108)
CO2 SERPL-SCNC: 30 MMOL/L (ref 21–32)
CREAT SERPL-MCNC: 0.8 MG/DL (ref 0.6–1.3)
ERYTHROCYTE [DISTWIDTH] IN BLOOD BY AUTOMATED COUNT: 15.6 % (ref 11.6–15.1)
FUNGUS SPEC CULT: NORMAL
GFR SERPL CREATININE-BSD FRML MDRD: 114 ML/MIN/1.73SQ M
GLUCOSE SERPL-MCNC: 97 MG/DL (ref 65–140)
GRAM STN SPEC: NORMAL
HCT VFR BLD AUTO: 35.5 % (ref 36.5–49.3)
HGB BLD-MCNC: 11.4 G/DL (ref 12–17)
INR PPP: 1.06 (ref 0.84–1.19)
MAGNESIUM SERPL-MCNC: 2.4 MG/DL (ref 1.9–2.7)
MCH RBC QN AUTO: 32.4 PG (ref 26.8–34.3)
MCHC RBC AUTO-ENTMCNC: 32.1 G/DL (ref 31.4–37.4)
MCV RBC AUTO: 101 FL (ref 82–98)
PLATELET # BLD AUTO: 264 THOUSANDS/UL (ref 149–390)
PMV BLD AUTO: 9.6 FL (ref 8.9–12.7)
POTASSIUM SERPL-SCNC: 3.9 MMOL/L (ref 3.5–5.3)
PROTHROMBIN TIME: 13.9 SECONDS (ref 11.6–14.5)
RBC # BLD AUTO: 3.52 MILLION/UL (ref 3.88–5.62)
SODIUM SERPL-SCNC: 146 MMOL/L (ref 135–147)
WBC # BLD AUTO: 8.56 THOUSAND/UL (ref 4.31–10.16)

## 2023-09-05 PROCEDURE — 94640 AIRWAY INHALATION TREATMENT: CPT

## 2023-09-05 PROCEDURE — 85027 COMPLETE CBC AUTOMATED: CPT | Performed by: NURSE PRACTITIONER

## 2023-09-05 PROCEDURE — 94669 MECHANICAL CHEST WALL OSCILL: CPT

## 2023-09-05 PROCEDURE — 94668 MNPJ CHEST WALL SBSQ: CPT

## 2023-09-05 PROCEDURE — 82330 ASSAY OF CALCIUM: CPT | Performed by: NURSE PRACTITIONER

## 2023-09-05 PROCEDURE — 83735 ASSAY OF MAGNESIUM: CPT | Performed by: NURSE PRACTITIONER

## 2023-09-05 PROCEDURE — 94760 N-INVAS EAR/PLS OXIMETRY 1: CPT

## 2023-09-05 PROCEDURE — 97110 THERAPEUTIC EXERCISES: CPT

## 2023-09-05 PROCEDURE — 80048 BASIC METABOLIC PNL TOTAL CA: CPT | Performed by: NURSE PRACTITIONER

## 2023-09-05 PROCEDURE — 94150 VITAL CAPACITY TEST: CPT

## 2023-09-05 PROCEDURE — 99291 CRITICAL CARE FIRST HOUR: CPT | Performed by: ANESTHESIOLOGY

## 2023-09-05 PROCEDURE — 85610 PROTHROMBIN TIME: CPT | Performed by: NURSE PRACTITIONER

## 2023-09-05 PROCEDURE — 99255 IP/OBS CONSLTJ NEW/EST HI 80: CPT | Performed by: SPECIALIST

## 2023-09-05 PROCEDURE — 85730 THROMBOPLASTIN TIME PARTIAL: CPT | Performed by: PHYSICIAN ASSISTANT

## 2023-09-05 PROCEDURE — 85730 THROMBOPLASTIN TIME PARTIAL: CPT | Performed by: ANESTHESIOLOGY

## 2023-09-05 PROCEDURE — 94003 VENT MGMT INPAT SUBQ DAY: CPT

## 2023-09-05 RX ORDER — HEPARIN SODIUM 1000 [USP'U]/ML
10000 INJECTION, SOLUTION INTRAVENOUS; SUBCUTANEOUS EVERY 6 HOURS PRN
Status: DISCONTINUED | OUTPATIENT
Start: 2023-09-05 | End: 2023-09-06

## 2023-09-05 RX ORDER — POLYETHYLENE GLYCOL 3350 17 G/17G
17 POWDER, FOR SOLUTION ORAL 2 TIMES DAILY
Status: DISCONTINUED | OUTPATIENT
Start: 2023-09-05 | End: 2023-09-05

## 2023-09-05 RX ORDER — HEPARIN SODIUM 1000 [USP'U]/ML
5000 INJECTION, SOLUTION INTRAVENOUS; SUBCUTANEOUS EVERY 6 HOURS PRN
Status: DISCONTINUED | OUTPATIENT
Start: 2023-09-05 | End: 2023-09-06

## 2023-09-05 RX ORDER — HEPARIN SODIUM 10000 [USP'U]/100ML
18 INJECTION, SOLUTION INTRAVENOUS
Status: DISCONTINUED | OUTPATIENT
Start: 2023-09-05 | End: 2023-09-06

## 2023-09-05 RX ADMIN — HEPARIN SODIUM 18 UNITS/KG/HR: 10000 INJECTION, SOLUTION INTRAVENOUS at 14:16

## 2023-09-05 RX ADMIN — ALBUTEROL SULFATE 2.5 MG: 2.5 SOLUTION RESPIRATORY (INHALATION) at 07:15

## 2023-09-05 RX ADMIN — Medication 20 MG: at 10:23

## 2023-09-05 RX ADMIN — SODIUM CHLORIDE SOLN NEBU 3% 4 ML: 3 NEBU SOLN at 07:28

## 2023-09-05 RX ADMIN — ALBUTEROL SULFATE 2.5 MG: 2.5 SOLUTION RESPIRATORY (INHALATION) at 13:07

## 2023-09-05 RX ADMIN — FLUOXETINE 10 MG: 10 CAPSULE ORAL at 09:48

## 2023-09-05 RX ADMIN — THIAMINE HCL TAB 100 MG 100 MG: 100 TAB at 09:48

## 2023-09-05 RX ADMIN — SODIUM CHLORIDE SOLN NEBU 3% 4 ML: 3 NEBU SOLN at 19:26

## 2023-09-05 RX ADMIN — CEFTRIAXONE 2000 MG: 2 INJECTION, SOLUTION INTRAVENOUS at 12:30

## 2023-09-05 RX ADMIN — HEPARIN SODIUM 5000 UNITS: 1000 INJECTION INTRAVENOUS; SUBCUTANEOUS at 20:52

## 2023-09-05 RX ADMIN — ALBUTEROL SULFATE 2.5 MG: 2.5 SOLUTION RESPIRATORY (INHALATION) at 19:26

## 2023-09-05 RX ADMIN — SODIUM CHLORIDE SOLN NEBU 3% 4 ML: 3 NEBU SOLN at 01:03

## 2023-09-05 RX ADMIN — CHLORHEXIDINE GLUCONATE 15 ML: 1.2 RINSE ORAL at 09:48

## 2023-09-05 RX ADMIN — SODIUM CHLORIDE SOLN NEBU 3% 4 ML: 3 NEBU SOLN at 13:20

## 2023-09-05 RX ADMIN — ALBUTEROL SULFATE 2.5 MG: 2.5 SOLUTION RESPIRATORY (INHALATION) at 01:03

## 2023-09-05 RX ADMIN — CHLORHEXIDINE GLUCONATE 15 ML: 1.2 RINSE ORAL at 20:18

## 2023-09-05 NOTE — ASSESSMENT & PLAN NOTE
Patient presented with hypoxia, found to have right lower lobe segmental PE. Initially saturating well on nasal cannula. Rapid response was called 8/24 secondary to hypoxia and altered mental status requiring emergent intubation. Hypoxic post intubation requiring high ventilator settings. · CTA PE study 8/23: Pulmonary embolism in the right lower lobe segmental pulmonary artery. Measured RV/LV ratio is within normal limits at less than 0.9.  · Initial CXR post intubation: Near complete opacity of the left lung likely related to worsening infiltrate and/or atelectasis. Patchy right upper lobe infiltrates. · Strep pneumo/legionella urine antigen negative  · COVID/Flu/RSV negative  · 8/25: Bronchoscopy: poorly tolerated secondary to hypoxia. Some mucus plugging present on the left. · 8/26: CT chest: Complete right lower lobe and near complete left lower lobe atelectasis. Pneumonia not excluded in the appropriate clinical setting. Mild interstitial edema with trace effusions  · Received diuresis with IV Lasix   · Completed Zosyn x 7 days (8/25-8/31) - MRSA nares neg  · Tolerated 7 hour of pressure support 15/6 but requiring full vent support HS secondary to tachypnea    Plan:  · Current vent settings: AC/VC 14/500/40/6  · Day 13 on Vent  · Continues to be weak. Unclear if muscular etiology vs depression contributing to weakness.  Patient encouraged to move extremities during the day when possible to build up strength for extubation  · NIF -26  · Goals of care discussion on 8/30 with Dr. Alysia Ferreira, mother, and patient at bedside  · Mother and patient explained about options for tracheostomy and PEG tube if no improvement on vent  · Patient expressed agreement with potential interventions with head nodding and agreeable to mother helping to make medical decisions  · Trach/PEG planned for Wednesday   · Wean Fio2 for goal Sp02 >90  · Continue pulmonary hygiene   · VAP ppx

## 2023-09-05 NOTE — PHYSICAL THERAPY NOTE
PT TREATMENT     09/05/23 8050   PT Last Visit   PT Visit Date 09/05/23   Note Type   Note Type Treatment   Pain Assessment   Pain Assessment Tool Choi-Baker FACES   Choi-Baker FACES Pain Rating 0   Restrictions/Precautions   Other Precautions Chair Alarm; Bed Alarm;Multiple lines;O2;Telemetry  (Seen in ICU, intubated awaiting possible trach and PEG 9-6-23)   General   Chart Reviewed Yes   Family/Caregiver Present No   Cognition   Arousal/Participation Cooperative   Subjective   Subjective Nonverbal with intubation   Bed Mobility   Additional Comments Unable to fully assess out of bed at this time due to medical status with intubation and ICU   Exercises   Hamstring Stretch Supine;5 reps;PROM; Bilateral   Hamstring Sets Supine;5 reps;AROM; Bilateral   Quad Sets Supine;10 reps;Bilateral   Heelslides Supine;10 reps;Bilateral   Glute Sets Supine;5 reps   Hip Abduction Supine;10 reps;Bilateral   Hip Adduction   (hip ER/IR, 10 reps B)   Knee AROM Short Arc Quad Supine;10 reps;Bilateral   Ankle Pumps Supine;10 reps;Bilateral   Marching Supine;10 reps;Bilateral   Neuro re-ed manually resisted hip abd/add for 10 reps each with rest periods   Assessment   Assessment Patient cooperative and able to follow commands also completed upper extremity exercises and will benefit from continued progression of PT services as tolerated. Patient possibly awaiting PEG and trach surgery tomorrow. At that time patient will be able to progress with functional mobility and increasing functional mobility with all clinical staff as well. The patient's AM-PAC Basic Mobility Inpatient Short Form Raw Score is 7. A Raw score of less than or equal to 16 suggests the patient may benefit from discharge to post-acute rehabilitation services. Please also refer to the recommendation of the Physical Therapist for safe discharge planning. Plan   Treatment/Interventions ADL retraining;Functional transfer training;LE strengthening/ROM; Therapeutic exercise; Endurance training;Patient/family training;Equipment eval/education; Bed mobility;Gait training; Compensatory technique education   PT Frequency Other (Comment)  (5 times per week)   Recommendation   PT Discharge Recommendation Post acute rehabilitation services   AM-PAC Basic Mobility Inpatient   Turning in Flat Bed Without Bedrails 2   Lying on Back to Sitting on Edge of Flat Bed Without Bedrails 1   Moving Bed to Chair 1   Standing Up From Chair Using Arms 1   Walk in Room 1   Climb 3-5 Stairs With Railing 1   Basic Mobility Inpatient Raw Score 7   Highest Level Of Mobility   JH-HLM Goal 2: Bed activities/Dependent transfer   JH-HLM Achieved 2: Bed activities/Dependent transfer   Education   Patient Demonstrates acceptance/verbal understanding;Explanation/teachback used;Demonstrates verbal understanding;Reinforcement needed   Licensure   [place] License Number  Johanny Hsieh, PT 4 0 [id number]

## 2023-09-05 NOTE — CASE MANAGEMENT
Case Management Discharge Planning Note    Patient name Bee Segura  Location ICU 03/ICU 41 MRN [id number]  : 1985 Date 2023       Current Admission Date: 2023  Current Admission Diagnosis:Acute respiratory failure with hypoxia [facility])   Patient Active Problem List    Diagnosis Date Noted   • Acute respiratory failure with hypoxia ([address]) 2023   • Acute pulmonary embolism without acute cor pulmonale ([address]) 2023   • History of Wernicke's encephalopathy 2023   • Depression 2023   • Toxic metabolic encephalopathy    • Wheezing 2023   • Testicular cancer ([address]) 2023   • (HFpEF) heart failure with preserved ejection fraction ([address]) 2023   • Chronic diastolic heart failure ([address]) 2023   • History of LVH (left ventricular hypertrophy) 2023   • Pure hypertriglyceridemia 2023   • Cerebral gliosis 2023   • Unilateral vestibular schwannoma ([address]) 2023   • Port-A-Cath in place 2023   • Diplopia 2023   • 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   • Urinary hesitancy    • Umbilical hernia without obstruction and without gangrene 12/10/2022   • Tobacco use 12/10/2022   • Retroperitoneal lymphadenopathy 12/10/2022   • Hypertension 12/10/2022      LOS (days): 12  Geometric Mean LOS (GMLOS) (days):   Days to GMLOS:     OBJECTIVE:  Risk of Unplanned Readmission Score: 27.1         Current admission status: Inpatient   Preferred Pharmacy:   [facility], [address]  Phone: [phone] Fax: [phone]    Primary Care Provider: Adolph Martinez MD    Primary Insurance: [address] [place] [address]  Secondary Insurance:     DISCHARGE DETAILS:    Discharge planning discussed with[de-identified] Deisi Sun (mother)  Freedom of Choice: Yes  Comments - Freedom of Choice: Preference for DEE DEE with [facility]  CM contacted family/caregiver?: Yes  Were Treatment Team discharge recommendations reviewed with patient/caregiver?: Yes  Did patient/caregiver verbalize understanding of patient care needs?: Yes  Were patient/caregiver advised of the risks associated with not following Treatment Team discharge recommendations?: Yes    Contacts  Patient Contacts: Michaela Ku (mother)  Relationship to Patient[de-identified] Family  Contact Method: Phone  Phone Number: [phone]  Reason/Outcome: Continuity of Care, Discharge [address]         Is the patient interested in [facility] at discharge?: Yes  [facility] requested[de-identified] Nursing, Occupational Therapy, Physical [facility] Provider[de-identified] PCP  Home Health Services Needed[de-identified] Evaluate Functional Status and Safety, Gait/ADL Training, Heart Failure Management, Strengthening/Theraputic Exercises to Improve Function, Wound/Ostomy Care, Other (comment) (Trach and peg tube care)  Homebound Criteria Met[de-identified] Requires the Assistance of Another Person for Safe Ambulation or to Leave the Home  Supporting Clincal Findings[de-identified] Limited Endurance, Dyspnea with Exertion, Fatigues Easliy in [employer]     Other Referral/Resources/Interventions Provided:  Interventions: [facility]  Referral Comments: CM called and spoke with patient's mother Shelby Gordon, introduced self and role and to discuss dcp and if the plan was to resume home health services at discharge or rehab options. Per Shelby Gordon, plan is to resume home health care with Yasmin Griffith at discharge. CM sent referral via [address] and made them aware about patient getting trach and peg placed tomm. CM to follow up pending clinical progress.      Discharge Destination Plan[de-identified] Home with [address] at Discharge :  (TBD)

## 2023-09-05 NOTE — ASSESSMENT & PLAN NOTE
Previous echocardiogram 6/2/23 showed EF 61%, normal diastolic dysfunction, mild left ventricular hypertrophy, mild RVSP elevation and mild TR  · PTA Med: Torsemide 20 mg daily  · Evaluated by Heart Failure team in July 2023.  LVH thought to be secondary to hypertension, obesity, hx of meth use  · Recommended sleep study at that time   · Echo this admission shows normal EF of 60%, normal LV thickness, no diastolic dysfunction

## 2023-09-05 NOTE — UTILIZATION REVIEW
Continued Stay Review    Date: 09/05/2023                         Current Patient Class: Inpatient  Current Level of Care: Critical Care    HPI:38 y.o. male initially admitted on 08/24/2023    Assessment/Plan: Acute Respiratory Failure with Hypoxia. Toxic Metabolic Encephalopathy. Acute PE. Intubated / vented (D13). Wean FiO2 Goal > 90. Trac / PEG planned for Wednesday. Cardio-Pulmonary monitoring. Neuro checks. Ceftriaxone started 9/1 to cover for possible neurosyphillis for 7 more days. Tentative plan for repeat LP?- holding lovenox this morning. Continue thiamine. Continue lovenox. Maintain NG Tube.       Vital Signs: /70   Pulse 85   Temp 98.6 °F (37 °C)   Resp 22   Ht 6' 4" (1.93 m)   Wt (!) 136 kg (300 lb 7.8 oz)   SpO2 97%   BMI 36.58 kg/m²     Pertinent Labs/Diagnostic Results:     Results from last 7 days   Lab Units 09/05/23  0600 09/03/23  0517 09/02/23  0522 08/30/23  0601   WBC Thousand/uL 8.56 8.14 8.20 8.75   HEMOGLOBIN g/dL 11.4* 11.2* 11.3* 12.0   HEMATOCRIT % 35.5* 35.8* 36.9 37.0   PLATELETS Thousands/uL 264 272 298 244   NEUTROS ABS Thousands/µL  --   --  5.93 6.80     Results from last 7 days   Lab Units 09/05/23  0600 09/03/23  0517 09/02/23  0522 08/31/23  0535 08/30/23  0601   SODIUM mmol/L 146 141 142 139 138   POTASSIUM mmol/L 3.9 4.0 4.0 3.6 3.6   CHLORIDE mmol/L 109* 105 104 99 97   CO2 mmol/L 30 32 32 34* 33*   ANION GAP mmol/L 7 4 6 6 8   BUN mg/dL 17 18 17 15 11   CREATININE mg/dL 0.80 0.75 0.86 0.83 0.88   EGFR ml/min/1.73sq m 114 118 111 113 110   CALCIUM mg/dL 9.3 9.1 9.2 9.3 9.3   CALCIUM, IONIZED mmol/L 1.15  --   --   --   --    MAGNESIUM mg/dL 2.4  --  2.3  --  2.3   PHOSPHORUS mg/dL  --   --  4.1  --  4.1     Results from last 7 days   Lab Units 09/05/23  0600 09/03/23  0517 09/02/23  0522 08/31/23  0535 08/30/23  0601   GLUCOSE RANDOM mg/dL 97 89 88 95 122     Results from last 7 days   Lab Units 09/05/23  0607   PROTIME seconds 13.9   INR  1.06 Results from last 7 days   Lab Units 09/03/23  1420   SPUTUM CULTURE  3+ Growth of Diphtheroids   GRAM STAIN RESULT  No Polys or Bacteria seen     Medications:   Scheduled Medications:  albuterol, 2.5 mg, Nebulization, Q6H  cefTRIAXone, 2,000 mg, Intravenous, Q24H  chlorhexidine, 15 mL, Mouth/Throat, Q12H ISAEL  FLUoxetine, 10 mg, Oral, Daily  omeprazole (PRILOSEC) suspension 2 mg/mL, 20 mg, Oral, Daily  sodium chloride, 4 mL, Nebulization, Q6H  thiamine, 100 mg, Oral, Daily      Continuous IV Infusions:     PRN Meds:  acetaminophen, 650 mg, Oral, Q4H PRN  fentanyl citrate (PF), 50 mcg, Intravenous, Q1H PRN  polyethylene glycol, 17 g, Oral, Daily PRN  senna, 17.6 mg, Oral, Daily PRN        Discharge Plan: D    Network Utilization Review Department  ATTENTION: Please call with any questions or concerns to [phone] and carefully listen to the prompts so that you are directed to the right person. All voicemails are confidential.  Alan Arredondo all requests for admission clinical reviews, approved or denied determinations and any other requests to dedicated fax number below belonging to the campus where the patient is receiving treatment.  List of dedicated fax numbers for the Facilities:  [place] DENIALS (Administrative/Medical Necessity) [phone] [address] [facility] (Maternity/NICU/Pediatrics) [phone]   [address] [place] [address] Drive [phone]   [facility] [address] [facility] [phone] 150[address] [facility] [address] [facility] [id number] [place] [address] [phone]   [address] [facility] [address] [phone]   [address] [place] [address] [address] [phone]

## 2023-09-05 NOTE — CONSULTS
Consultation - General Surgery   Dada Camargo 41 y.o. male MRN: [id number]  Unit/Bed#: ICU 03 Encounter: [id number]    Assessment/Plan     Assessment:  · Acute respiratory failure -- intubated 8/24, received diuresis after CT chest revealed complete and near complete atelectasis of right and left lower lobes respectively, still vent dependent, Current vent settings: AC/VC 51/821/68/7  · Toxic metabolic encephalopathy --undergoing extensive work-up however etiology still unclear, possibly Wernicke's encephalopathy, mentation improving  · Acute PE --originally placed on heparin gtt, transition to therapeutic Lovenox on 8/26, on hold currently in anticipation of LP        Plan:  CT a/p reviewed   Obtained procedure consent from mother, Grabiel Ontiveros  NPO at midnight   Miralax ordered   Discussed briefly with critical care team   Trach and PEG tube scheduled for afternoon of 9/6        History of Present Illness     HPI:  Dada Camargo is a 41 y.o. 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 Patient was ultimately intubated secondary to worsening hypoxia and encephalopathy. Over the course of the past two weeks patient has been ventilator dependent. He is arousable and obeys commands. No current alcohol or tobacco use. He is largely sedentary at home.  EEG negative last month            Inpatient consult to Acute Care Surgery  Consult performed by: Yeni Reilly PA-C  Consult ordered by: Tez Corral MD          Review of Systems   Unable to perform ROS: Intubated       Historical Information   Past Medical History:   Diagnosis Date   • Anxiety    • Cancer [facility])    • Depression    • Psychiatric disorder     bipolar     Past Surgical History:   Procedure Laterality Date   • IR BIOPSY LYMPH NODE  1/20/2023   • IR PORT PLACEMENT  3/14/2023   • ORCHIECTOMY Left 12/14/2022    Procedure: ORCHIECTOMY INGUINAL;  Surgeon: Emanuel Galo MD;  Location: [facility];  Service: Urology     Social History   Social History     Substance and Sexual Activity   Alcohol Use Not Currently    Comment: 2 cases of beer daily, stopped 3 years ago     Social History     Substance and Sexual Activity   Drug Use Yes   • Types: Marijuana    Comment: Medical marijuana     E-Cigarette/Vaping   • E-Cigarette Use Never User      E-Cigarette/Vaping Substances   • Nicotine No    • THC Yes    • CBD No    • Flavoring No    • Other No    • Unknown No      Social History     Tobacco Use   Smoking Status Former   • Packs/day: 0.50   • Years: 5.00   • Total pack years: 2.50   • Types: Cigarettes   • Start date: 1/1/2008   Smokeless Tobacco Never     Family History:   Family History   Problem Relation Age of Onset   • Coronary artery disease Maternal Aunt        Meds/Allergies   all current active meds have been reviewed  No Known Allergies    Objective   First Vitals:   Blood Pressure: 117/55 (08/24/23 1832)  Pulse: 79 (08/24/23 1832)  Temperature: (!) 96.4 °F (35.8 °C) (08/24/23 1832)  Temp Source: Bladder (08/24/23 1835)  Respirations: 16 (08/24/23 1832)  Height: 6' 4" (193 cm) (08/24/23 1835)  Weight - Scale: (!) 145 kg (319 lb 10.7 oz) (08/24/23 1835)  SpO2: (!) 72 % (08/24/23 1820)    Current Vitals:   Blood Pressure: 104/64 (09/05/23 1100)  Pulse: 92 (09/05/23 1100)  Temperature: 99.9 °F (37.7 °C) (09/05/23 1100)  Temp Source: Esophageal (09/05/23 0800)  Respirations: (!) 24 (09/05/23 1100)  Height: 6' 4" (193 cm) (08/25/23 1137)  Weight - Scale: (!) 136 kg (300 lb 7.8 oz) (09/05/23 0600)  SpO2: 100 % (09/05/23 1149)      Intake/Output Summary (Last 24 hours) at 9/5/2023 1227  Last data filed at 9/5/2023 0600  Gross per 24 hour   Intake 2521 ml   Output 800 ml   Net 1721 ml       Invasive Devices     Central Venous Catheter Line  Duration           Port A Cath Right -- days          Peripheral Intravenous Line  Duration           Long-Dwell Peripheral IV (Midline) 08/23/23 Left Brachial 12 days    Peripheral IV 09/03/23 Dorsal (posterior); Left Forearm 2 days          Drain  Duration           NG/OG/Enteral Tube 18 Fr Center mouth 11 days    External Urinary Catheter Small 8 days          Airway  Duration           ETT  8 mm 11 days                Physical Exam  Vitals reviewed. Constitutional:       General: He is not in acute distress. Appearance: He is well-developed. He is obese. He is ill-appearing. He is not toxic-appearing or diaphoretic. Interventions: He is intubated and restrained. HENT:      Head: Normocephalic and atraumatic. Not macrocephalic and not microcephalic. No raccoon eyes, Camejo's sign, right periorbital erythema or left periorbital erythema. Right Ear: Hearing and external ear normal.      Left Ear: Hearing and external ear normal.      Nose: Nose normal.   Eyes:      General: No scleral icterus. Right eye: No discharge. Left eye: No discharge. Conjunctiva/sclera: Conjunctivae normal.      Right eye: Right conjunctiva is not injected. No hemorrhage. Left eye: Left conjunctiva is not injected. No hemorrhage. Pupils: Pupils are equal, round, and reactive to light. Cardiovascular:      Rate and Rhythm: Normal rate and regular rhythm. Heart sounds: Normal heart sounds. Pulmonary:      Effort: Pulmonary effort is normal. No tachypnea, bradypnea or respiratory distress. He is intubated. Breath sounds: Normal breath sounds. No stridor. No decreased breath sounds, wheezing or rhonchi. Chest:      Chest wall: No tenderness or crepitus. Abdominal:      General: Bowel sounds are normal. There is no distension. Palpations: Abdomen is not rigid.  There is no hepatomegaly or splenomegaly. Tenderness: There is no abdominal tenderness. There is no guarding or rebound. Hernia: A hernia is present. Hernia is present in the umbilical area. Musculoskeletal:      Right lower leg: No edema. Left lower leg: No edema. Skin:     General: Skin is warm and dry. Capillary Refill: Capillary refill takes less than 2 seconds. Coloration: Skin is not cyanotic or jaundiced. Neurological:      Mental Status: He is easily aroused. He is not disoriented. GCS: GCS eye subscore is 4. GCS verbal subscore is 5. GCS motor subscore is 6. Lab Results:   I have personally reviewed pertinent lab results. , CBC:   Lab Results   Component Value Date    WBC 8.56 09/05/2023    HGB 11.4 (L) 09/05/2023    HCT 35.5 (L) 09/05/2023     (H) 09/05/2023     09/05/2023    RBC 3.52 (L) 09/05/2023    MCH 32.4 09/05/2023    MCHC 32.1 09/05/2023    RDW 15.6 (H) 09/05/2023    MPV 9.6 09/05/2023   , CMP:   Lab Results   Component Value Date    SODIUM 146 09/05/2023    K 3.9 09/05/2023     (H) 09/05/2023    CO2 30 09/05/2023    BUN 17 09/05/2023    CREATININE 0.80 09/05/2023    CALCIUM 9.3 09/05/2023    EGFR 114 09/05/2023   , Coagulation:   Lab Results   Component Value Date    INR 1.06 09/05/2023   , Urinalysis: No results found for: "COLORU", "CLARITYU", "SPECGRAV", "PHUR", "LEUKOCYTESUR", "NITRITE", "PROTEINUA", "GLUCOSEU", "Willene Blackstone", "BILIRUBINUR", "BLOODU", Lipase: No results found for: "LIPASE"  Imaging: I have personally reviewed pertinent reports. and I have personally reviewed pertinent films in PACS  EKG, Pathology, and Other Studies: I have personally reviewed pertinent reports. Counseling / Coordination of Care  Total floor / unit time spent today 40 minutes. Greater than 50% of total time was spent with the patient and / or family counseling and / or coordination of care.   A description of the counseling / coordination of care: reviewing patient history per chart, performing physical exam, reviewing pertinent labs and imaging, discussed management with attending physician, obtaining consent from family.

## 2023-09-05 NOTE — PROGRESS NOTES
[id number] [facility]  Progress Note  Name: Elizabeth Buckley  MRN: [id number]  Unit/Bed#: ICU 03 I Date of Admission: 8/24/2023   Date of Service: 9/5/2023 I Hospital Day: 12    Assessment/Plan   * Acute respiratory failure with hypoxia [facility])  Assessment & Plan  Patient presented with hypoxia, found to have right lower lobe segmental PE. Initially saturating well on nasal cannula. Rapid response was called 8/24 secondary to hypoxia and altered mental status requiring emergent intubation. Hypoxic post intubation requiring high ventilator settings. · CTA PE study 8/23: Pulmonary embolism in the right lower lobe segmental pulmonary artery. Measured RV/LV ratio is within normal limits at less than 0.9.  · Initial CXR post intubation: Near complete opacity of the left lung likely related to worsening infiltrate and/or atelectasis. Patchy right upper lobe infiltrates. · Strep pneumo/legionella urine antigen negative  · COVID/Flu/RSV negative  · 8/25: Bronchoscopy: poorly tolerated secondary to hypoxia. Some mucus plugging present on the left. · 8/26: CT chest: Complete right lower lobe and near complete left lower lobe atelectasis. Pneumonia not excluded in the appropriate clinical setting. Mild interstitial edema with trace effusions  · Received diuresis with IV Lasix   · Completed Zosyn x 7 days (8/25-8/31) - MRSA nares neg  · Tolerated 7 hour of pressure support 15/6 but requiring full vent support HS secondary to tachypnea    Plan:  · Current vent settings: AC/VC 14/500/40/6  · Day 13 on Vent  · Continues to be weak. Unclear if muscular etiology vs depression contributing to weakness.  Patient encouraged to move extremities during the day when possible to build up strength for extubation  · NIF -26  · Goals of care discussion on 8/30 with Dr. Lillian Lizama, mother, and patient at bedside  · Mother and patient explained about options for tracheostomy and PEG tube if no improvement on vent  · Patient expressed agreement with potential interventions with head nodding and agreeable to mother helping to make medical decisions  · Trach/PEG planned for Wednesday   · Wean Fio2 for goal Sp02 >90  · Continue pulmonary hygiene   · VAP ppx    Toxic metabolic encephalopathy  Assessment & Plan  [facility] with hypoxia and encephalopathy. Acute change in GCS prompted rapid response and subsequent intubation for airway protection. Of note, pt w/ recent hospitalization to [facility] 8/11-8/17 for encephalopathy with unclear etiology despite extensive workup but Wernicke's encephalopathy was on the differential.    · [facility] [place] workup 8/11:  · Rapid response and intubation on 8/12 for airway protection, extubated on 8/13  · CT head 8/11: No acute intracranial abnormality. Stable small bilateral subcortical hypoattenuating foci. No associated mass effect. · MRI 8/12: No acute intracranial abnormality. Stable nonspecific enhancement along the anterior floor of the third ventricle/hypothalamus compared to March 2023 study. Findings below:  · Differentials includes chronic toxic metabolic insult (thiamine deficiency), chronic inflammatory/infectious conditions  · Other considerations include Langerhans cell cell histiocytosis, lymphocytic hypophysitis, and neurosyphilis. · Primary CNS lymphoma less likely consideration, given the lack of interval change. Serologic correlation is recommended. Stable cerebral white matter signal abnormality, presumably the sequela of chronic microangiopathic disease given the patient's history  · VEEG 8/12: negative for seizure activity. Possible findings relating to underlying sleep disorder   · LP: Grossly negative overall  · Meningitis/encephalitis panel negative, bacterial/fungal culture and gram negative, lyme and cryptococcal negative  · Paraneoplastic panel neg  · Unable to obtain CSF VDRL  · DSPO: Treated with high dose thiamine.  Psych evaluated, possible bipolar disorder. Discharged to home with his sister with visiting nurses, speech therapy, PT/OT  ·  [facility] ([place] 8/23: Presented with hypoxia found by visiting nurse SPO2 in 80's  · 8/24: Patient's girlfriend visited [facility] and found him unresponsive. Notes said that 'eyes were rolled back with secretions around his mouth"  · Rapid response called and intubated for hypoxia and low GCS. VBG ? Post intubation 7.25/76/59/33  · Transferred to [facility] on 8/24  ·  Naima Witt 8/24 (Current hospital course)  · CT head 8/24: No acute intracranial abnormality  · 8/25 EEG: No evidence of electrographic seizures. Mild background slowing suggestive of underlying cerebral dysfunction from toxic/metablic/infectious/hypoxic encephalopathy. Clinical correlation is recommended  · On neurological exam: wakes to voice, weakly follows one step commands with all extremities   · Continues to be weak off sedation    Plan:  · Mentation now improved but remains with significant generalized weakness   · Neurology consulted, recommendations as follows:  · Somnolence has been going on for a while, some neuro symptoms after chemo in march. · Ach R Ab negative   · Serum anti-TPO and DARIN Ab's, RPR, neg. CSF paraneoplastic panel neg  · Ceftriaxone started 9/1 to cover for possible neurosyphillis for 7 more days  · Tentative plan for repeat LP today ?- holding lovenox this morning  · Psych input? · Continue thiamine  · CAM ICU  · Sleep hygiene  · Neurochecks per routine     Acute pulmonary embolism without acute cor pulmonale (HCC)  Assessment & Plan  Presented with hypoxia, initially admitted to the floor on supplemental NC  · CTA PE: Pulmonary embolism in the right lower lobe segmental pulmonary artery. Measured RV/LV ratio is within normal limits at less than 0.9.   · BNP 9, initial HS troponin 9  · Initiated on heparin gtt, transitioned to therapeutic Lovenox on 8/26  · Developed worsening hypoxia 8/24, rapid response was called and patient was intubated for airway protection and hypoxic  · Now requiring high vent setting  · Echo completed 8/25: Left Ventricle: Left ventricular cavity size is normal. Wall thickness is normal. The left ventricular ejection fraction is 60% by visual estimation. . Wall motion is normal. Diastolic function is normal for age. Right Ventricle: Right ventricular cavity size is normal. Systolic function is normal. Normal tricuspid annular plane systolic excursion (TAPSE) > 1.7 cm. Plan:  · Heparin converted to therapeutic lovenox on 8/26  · Holding lovenox this AM for possible LP  · Plan to transition to [address] after trach/PEG  · Will need transition to [address] on DC for 3 months  · Dopplers of LE neg for DVT bilaterally    History of LVH (left ventricular hypertrophy)  Assessment & Plan  Previous echocardiogram 6/2/23 showed EF 61%, normal diastolic dysfunction, mild left ventricular hypertrophy, mild RVSP elevation and mild TR  · PTA Med: Torsemide 20 mg daily  · Evaluated by Heart Failure team in July 2023. LVH thought to be secondary to hypertension, obesity, hx of meth use  · Recommended sleep study at that time   · Echo this admission shows normal EF of 60%, normal LV thickness, no diastolic dysfunction    Depression  Assessment & Plan  · Continue Fluoxetine 10 mg daily  · Evaluated by psychiatry last admission, felt to have depressive mood disorder  · Possibly contributing to weakness on vent despite being off sedation  · Repeat psych evaluation, if able considering patient is intubated    Seminoma of left testis [facility])  Assessment & Plan  · Testicular cancer s/p left orchiectomy on 12/2022  · Repeat CT scan in January 2023 with enlarging lymph node.   · Began Chemo in March 2023: Etoposide and Cisplatin with plan for 4 cycles, 5 days every 21 days  · Did not complete chemo treatment due to adverse effects  · Stopped Cisplatin after 1 cycle due to painful neuropathy  · Continued Etoposide 2nd cycle  · Resumed Cisplatin and Etoposide 3rd cycle  · After 3rd cycle reported double vision, labile affect and right sided weakness and therefore chemotherapy was stopped  · Stopped chemo on 5/26/23  · MRI demonstrated white matter changes at that time and he was referred to Neurology   · Continue outpatient follow up with Heme/Onc     Hypertension  Assessment & Plan  Hx of hypertension. PTA was on Torsemide 20 mg daily  · Echo this admission showed EF 60%, normal LV thickness, no systolic or diastolic dysfunction  · Hypotension s/p intubation initially requiring Levophed DC'ed on 8/27  · BP's now stable MAP [id number]    Umbilical hernia without obstruction and without gangrene  Assessment & Plan  · Umbilical hernia present, easily reducible              ICU Core Measures     Vented Patient  VAP Bundle  VAP bundle ordered     A: Assess, Prevent, and Manage Pain · Has pain been assessed? Yes  · Need for changes to pain regimen? No   B: Both Spontaneous Awakening Trials (SATs) and Spontaneous Breathing Trials (SBTs) · Plan to perform spontaneous awakening trial today? Yes   · Plan to perform spontaneous breathing trial today? Yes   · Obvious barriers to extubation? Yes   C: Choice of Sedation · RASS Goal: N/A patient not on sedation  · Need for changes to sedation or analgesia regimen? NA   D: Delirium · CAM-ICU: Negative   E: Early Mobility  · Plan for early mobility? Yes   F: Family Engagement · Plan for family engagement today? Yes       Antibiotic Review: Awaiting culture results. Review of Invasive Devices:      Central access plan: port      Prophylaxis:  VTE VTE covered by:  enoxaparin, Subcutaneous, 135 mg at 09/04/23 2137       Stress Ulcer  covered byomeprazole (PRILOSEC) suspension 2 mg/mL [[id number]]       Subjective   HPI/24hr events: Tolerated 7 hours of PS 15/6, 40% yesterday before returning to AC/VC. Increased amount of endotracheal secretions that are thin and clear. Lovenox held this AM for possible LP.      Review of Systems   Unable to perform ROS: Intubated        Objective                            Vitals I/O      Most Recent Min/Max in 24hrs   Temp 99 °F (37.2 °C) Temp  Min: 98.1 °F (36.7 °C)  Max: 100.2 °F (37.9 °C)   Pulse 85 Pulse  Min: 79  Max: 100   Resp (!) 23 Resp  Min: 14  Max: 52   /59 BP  Min: 89/51  Max: 121/77   O2 Sat 96 % SpO2  Min: 92 %  Max: 99 %      Intake/Output Summary (Last 24 hours) at 9/5/2023 0539  Last data filed at 9/4/2023 1801  Gross per 24 hour   Intake 2434 ml   Output 350 ml   Net 2084 ml         Diet Enteral/Parenteral; Tube Feeding No Oral Diet; Jevity 1.2 Scott; Continuous; 70; Prosource Protein Liquid - One Packet Daily; 140; Water; Every 4 hours     Invasive Monitoring Physical exam    Physical Exam  Eyes:      Extraocular Movements: Extraocular movements intact. Pupils: Pupils are equal, round, and reactive to light. Skin:     General: Skin is warm and dry. Coloration: Skin is not jaundiced. HENT:      Head: Normocephalic and atraumatic. Mouth/Throat:      Mouth: Mucous membranes are moist.   Cardiovascular:      Rate and Rhythm: Normal rate and regular rhythm. Musculoskeletal:      Right lower leg: Trace Edema present. Left lower leg: Trace Edema present. Abdominal:      General: Abdomen is protuberant. Bowel sounds are normal.      Palpations: Abdomen is soft. Tenderness: There is no abdominal tenderness. There is no guarding. Hernia: A hernia is present. Hernia is present in the umbilical area. Constitutional:       General: He is awake. Appearance: He is well-developed. He is obese. Interventions: He is intubated. Pulmonary:      Effort: No accessory muscle usage, respiratory distress or accessory muscle usage. He is intubated. Breath sounds: Examination of the right-upper field reveals rhonchi. Examination of the left-upper field reveals rhonchi. Decreased breath sounds and rhonchi present. No wheezing or rales. Neurological:      General: No focal deficit present. Mental Status: He is alert. Mental status is at baseline. Comments: Moving all extremities to commands equally, significant generalized weakness            Diagnostic Studies      EKG:   Imaging:  I have personally reviewed pertinent reports.    and I have personally reviewed pertinent films in PACS     Medications:  Scheduled PRN   albuterol, 2.5 mg, Q6H  cefTRIAXone, 2,000 mg, Q24H  chlorhexidine, 15 mL, Q12H ISAEL  enoxaparin, 1 mg/kg, Q12H [address]  FLUoxetine, 10 mg, Daily  omeprazole (PRILOSEC) suspension 2 mg/mL, 20 mg, Daily  sodium chloride, 4 mL, Q6H  thiamine, 100 mg, Daily      acetaminophen, 650 mg, Q4H PRN  fentanyl citrate (PF), 50 mcg, Q1H PRN  polyethylene glycol, 17 g, Daily PRN  senna, 17.6 mg, Daily PRN       Continuous          Labs:    CBC    No recent results  BMP    No recent results    Coags    No recent results     Additional Electrolytes  No recent results       Blood Gas    No recent results  No recent results LFTs  No recent results    Infectious  No recent results  Glucose  No recent results            Ashvinee AMBER Greenwood

## 2023-09-05 NOTE — ASSESSMENT & PLAN NOTE
Presented with hypoxia, initially admitted to the floor on supplemental NC  · CTA PE: Pulmonary embolism in the right lower lobe segmental pulmonary artery. Measured RV/LV ratio is within normal limits at less than 0.9. · BNP 9, initial HS troponin 9  · Initiated on heparin gtt, transitioned to therapeutic Lovenox on 8/26  · Developed worsening hypoxia 8/24, rapid response was called and patient was intubated for airway protection and hypoxic  · Now requiring high vent setting  · Echo completed 8/25: Left Ventricle: Left ventricular cavity size is normal. Wall thickness is normal. The left ventricular ejection fraction is 60% by visual estimation. . Wall motion is normal. Diastolic function is normal for age. Right Ventricle: Right ventricular cavity size is normal. Systolic function is normal. Normal tricuspid annular plane systolic excursion (TAPSE) > 1.7 cm.     Plan:  · Heparin converted to therapeutic lovenox on 8/26  · Holding lovenox this AM for possible LP  · Plan to transition to [address] after trach/PEG  · Will need transition to [address] on DC for 3 months  · Dopplers of LE neg for DVT bilaterally

## 2023-09-05 NOTE — QUICK NOTE
I updated the patient's mother Neymanny Adia today. We discussed tentative plans for trach/peg and LP tomorrow. All questions were answered.

## 2023-09-05 NOTE — ASSESSMENT & PLAN NOTE
Patient initially presented to [facility] on 8/23 with hypoxia and encephalopathy. Acute change in GCS prompted rapid response and subsequent intubation for airway protection. Of note, pt w/ recent hospitalization to [facility] 8/11-8/17 for encephalopathy with unclear etiology despite extensive workup but Wernicke's encephalopathy was on the differential.    ·  [place] workup 8/11:  · Rapid response and intubation on 8/12 for airway protection, extubated on 8/13  · CT head 8/11: No acute intracranial abnormality. Stable small bilateral subcortical hypoattenuating foci. No associated mass effect. · MRI 8/12: No acute intracranial abnormality. Stable nonspecific enhancement along the anterior floor of the third ventricle/hypothalamus compared to March 2023 study. Findings below:  · Differentials includes chronic toxic metabolic insult (thiamine deficiency), chronic inflammatory/infectious conditions  · Other considerations include Langerhans cell cell histiocytosis, lymphocytic hypophysitis, and neurosyphilis. · Primary CNS lymphoma less likely consideration, given the lack of interval change. Serologic correlation is recommended. Stable cerebral white matter signal abnormality, presumably the sequela of chronic microangiopathic disease given the patient's history  · VEEG 8/12: negative for seizure activity. Possible findings relating to underlying sleep disorder   · LP: Grossly negative overall  · Meningitis/encephalitis panel negative, bacterial/fungal culture and gram negative, lyme and cryptococcal negative  · Paraneoplastic panel neg  · Unable to obtain CSF VDRL  · DSPO: Treated with high dose thiamine. Psych evaluated, possible bipolar disorder. Discharged to home with his sister with visiting nurses, speech therapy, PT/OT  · [facility] ([place] 8/23: Presented with hypoxia found by visiting nurse SPO2 in 80's  · 8/24: Patient's girlfriend visited [facility] and found him unresponsive.  Notes said that 'eyes were rolled back with secretions around his mouth"  · Rapid response called and intubated for hypoxia and low GCS. VBG ? Post intubation 7.25/76/59/33  · Transferred to [facility] on 8/24  · [facility]SH[facility] CONK[facility] 8/24 (Current hospital course)  · CT head 8/24: No acute intracranial abnormality  · 8/25 EEG: No evidence of electrographic seizures. Mild background slowing suggestive of underlying cerebral dysfunction from toxic/metablic/infectious/hypoxic encephalopathy. Clinical correlation is recommended  · On neurological exam: wakes to voice, weakly follows one step commands with all extremities   · Continues to be weak off sedation    Plan:  · Mentation now improved but remains with significant generalized weakness   · Neurology consulted, recommendations as follows:  · Somnolence has been going on for a while, some neuro symptoms after chemo in march. · Ach R Ab negative   · Serum anti-TPO and DARIN Ab's, RPR, neg. CSF paraneoplastic panel neg  · Ceftriaxone started 9/1 to cover for possible neurosyphillis for 7 more days  · Tentative plan for repeat LP today ?- holding lovenox this morning  · Psych input?    · Continue thiamine  · CAM ICU  · Sleep hygiene  · Neurochecks per routine

## 2023-09-06 ENCOUNTER — ANESTHESIA (INPATIENT)
Dept: PERIOP | Facility: HOSPITAL | Age: 38
End: 2023-09-06
Payer: COMMERCIAL

## 2023-09-06 ENCOUNTER — ANESTHESIA EVENT (INPATIENT)
Dept: PERIOP | Facility: HOSPITAL | Age: 38
End: 2023-09-06
Payer: COMMERCIAL

## 2023-09-06 ENCOUNTER — APPOINTMENT (OUTPATIENT)
Dept: NON INVASIVE DIAGNOSTICS | Facility: HOSPITAL | Age: 38
DRG: 005 | End: 2023-09-06
Payer: COMMERCIAL

## 2023-09-06 PROBLEM — F41.9 ANXIETY: Status: ACTIVE | Noted: 2023-09-06

## 2023-09-06 LAB
ACHR BLOCK AB/ACHR TOTAL SFR SER: 13 % (ref 0–25)
ANION GAP SERPL CALCULATED.3IONS-SCNC: 7 MMOL/L
APPEARANCE CSF: NORMAL
APTT PPP: 25 SECONDS (ref 23–37)
BUN SERPL-MCNC: 15 MG/DL (ref 5–25)
CALCIUM SERPL-MCNC: 9.3 MG/DL (ref 8.4–10.2)
CHLORIDE SERPL-SCNC: 110 MMOL/L (ref 96–108)
CO2 SERPL-SCNC: 29 MMOL/L (ref 21–32)
CREAT SERPL-MCNC: 0.79 MG/DL (ref 0.6–1.3)
ERYTHROCYTE [DISTWIDTH] IN BLOOD BY AUTOMATED COUNT: 15.4 % (ref 11.6–15.1)
GFR SERPL CREATININE-BSD FRML MDRD: 115 ML/MIN/1.73SQ M
GLUCOSE CSF-MCNC: 56 MG/DL (ref 40–70)
GLUCOSE SERPL-MCNC: 92 MG/DL (ref 65–140)
GRAM STN SPEC: NORMAL
HCT VFR BLD AUTO: 37.2 % (ref 36.5–49.3)
HGB BLD-MCNC: 11.6 G/DL (ref 12–17)
HSV1 DNA CSF QL NAA+PROBE: NOT DETECTED
HSV1 DNA CSF QL NAA+PROBE: NOT DETECTED
INR PPP: 1.07 (ref 0.84–1.19)
LYMPHOCYTES NFR CSF MANUAL: 98 %
MAGNESIUM SERPL-MCNC: 2.4 MG/DL (ref 1.9–2.7)
MCH RBC QN AUTO: 31.4 PG (ref 26.8–34.3)
MCHC RBC AUTO-ENTMCNC: 31.2 G/DL (ref 31.4–37.4)
MCV RBC AUTO: 101 FL (ref 82–98)
MONOS+MACROS CSF MANUAL: 2 %
PHOSPHATE SERPL-MCNC: 3.8 MG/DL (ref 2.7–4.5)
PLATELET # BLD AUTO: 255 THOUSANDS/UL (ref 149–390)
PMV BLD AUTO: 9.7 FL (ref 8.9–12.7)
POTASSIUM SERPL-SCNC: 4 MMOL/L (ref 3.5–5.3)
PROT CSF-MCNC: 43 MG/DL (ref 15–45)
PROTHROMBIN TIME: 14 SECONDS (ref 11.6–14.5)
RBC # BLD AUTO: 3.7 MILLION/UL (ref 3.88–5.62)
SODIUM SERPL-SCNC: 146 MMOL/L (ref 135–147)
TOTAL CELLS COUNTED BLD: NO
TOTAL CELLS COUNTED SPEC: 100
TUBE # CSF: 4
WBC # BLD AUTO: 8.66 THOUSAND/UL (ref 4.31–10.16)
WBC # CSF AUTO: 5 /UL (ref 0–5)

## 2023-09-06 PROCEDURE — 82164 ANGIOTENSIN I ENZYME TEST: CPT | Performed by: PSYCHIATRY & NEUROLOGY

## 2023-09-06 PROCEDURE — 94003 VENT MGMT INPAT SUBQ DAY: CPT

## 2023-09-06 PROCEDURE — 87070 CULTURE OTHR SPECIMN AEROBIC: CPT | Performed by: PSYCHIATRY & NEUROLOGY

## 2023-09-06 PROCEDURE — 87529 HSV DNA AMP PROBE: CPT | Performed by: PSYCHIATRY & NEUROLOGY

## 2023-09-06 PROCEDURE — 94668 MNPJ CHEST WALL SBSQ: CPT

## 2023-09-06 PROCEDURE — 85730 THROMBOPLASTIN TIME PARTIAL: CPT | Performed by: PHYSICIAN ASSISTANT

## 2023-09-06 PROCEDURE — 85610 PROTHROMBIN TIME: CPT | Performed by: NURSE PRACTITIONER

## 2023-09-06 PROCEDURE — 84100 ASSAY OF PHOSPHORUS: CPT | Performed by: NURSE PRACTITIONER

## 2023-09-06 PROCEDURE — 99233 SBSQ HOSP IP/OBS HIGH 50: CPT | Performed by: ANESTHESIOLOGY

## 2023-09-06 PROCEDURE — 009U3ZX DRAINAGE OF SPINAL CANAL, PERCUTANEOUS APPROACH, DIAGNOSTIC: ICD-10-PCS | Performed by: INTERNAL MEDICINE

## 2023-09-06 PROCEDURE — 84157 ASSAY OF PROTEIN OTHER: CPT | Performed by: PSYCHIATRY & NEUROLOGY

## 2023-09-06 PROCEDURE — 89051 BODY FLUID CELL COUNT: CPT | Performed by: PSYCHIATRY & NEUROLOGY

## 2023-09-06 PROCEDURE — 82945 GLUCOSE OTHER FLUID: CPT | Performed by: PSYCHIATRY & NEUROLOGY

## 2023-09-06 PROCEDURE — 83735 ASSAY OF MAGNESIUM: CPT | Performed by: NURSE PRACTITIONER

## 2023-09-06 PROCEDURE — 94760 N-INVAS EAR/PLS OXIMETRY 1: CPT

## 2023-09-06 PROCEDURE — 85027 COMPLETE CBC AUTOMATED: CPT | Performed by: NURSE PRACTITIONER

## 2023-09-06 PROCEDURE — 62328 DX LMBR SPI PNXR W/FLUOR/CT: CPT | Performed by: INTERNAL MEDICINE

## 2023-09-06 PROCEDURE — 86592 SYPHILIS TEST NON-TREP QUAL: CPT | Performed by: PSYCHIATRY & NEUROLOGY

## 2023-09-06 PROCEDURE — 97167 OT EVAL HIGH COMPLEX 60 MIN: CPT

## 2023-09-06 PROCEDURE — 94150 VITAL CAPACITY TEST: CPT

## 2023-09-06 PROCEDURE — 87798 DETECT AGENT NOS DNA AMP: CPT | Performed by: PSYCHIATRY & NEUROLOGY

## 2023-09-06 PROCEDURE — 82306 VITAMIN D 25 HYDROXY: CPT | Performed by: NURSE PRACTITIONER

## 2023-09-06 PROCEDURE — 94640 AIRWAY INHALATION TREATMENT: CPT

## 2023-09-06 PROCEDURE — 88185 FLOWCYTOMETRY/TC ADD-ON: CPT

## 2023-09-06 PROCEDURE — 80048 BASIC METABOLIC PNL TOTAL CA: CPT | Performed by: NURSE PRACTITIONER

## 2023-09-06 PROCEDURE — 83519 RIA NONANTIBODY: CPT

## 2023-09-06 PROCEDURE — 88184 FLOWCYTOMETRY/ TC 1 MARKER: CPT | Performed by: PSYCHIATRY & NEUROLOGY

## 2023-09-06 PROCEDURE — 94669 MECHANICAL CHEST WALL OSCILL: CPT

## 2023-09-06 PROCEDURE — 62328 DX LMBR SPI PNXR W/FLUOR/CT: CPT

## 2023-09-06 RX ORDER — HEPARIN SODIUM 10000 [USP'U]/100ML
3-30 INJECTION, SOLUTION INTRAVENOUS
Status: DISPENSED | OUTPATIENT
Start: 2023-09-06 | End: 2023-09-08

## 2023-09-06 RX ORDER — HEPARIN SODIUM 1000 [USP'U]/ML
5000 INJECTION, SOLUTION INTRAVENOUS; SUBCUTANEOUS EVERY 6 HOURS PRN
Status: DISCONTINUED | OUTPATIENT
Start: 2023-09-06 | End: 2023-09-09

## 2023-09-06 RX ORDER — HEPARIN SODIUM 1000 [USP'U]/ML
10000 INJECTION, SOLUTION INTRAVENOUS; SUBCUTANEOUS EVERY 6 HOURS PRN
Status: DISCONTINUED | OUTPATIENT
Start: 2023-09-06 | End: 2023-09-09

## 2023-09-06 RX ORDER — LIDOCAINE HYDROCHLORIDE 10 MG/ML
INJECTION, SOLUTION EPIDURAL; INFILTRATION; INTRACAUDAL; PERINEURAL AS NEEDED
Status: COMPLETED | OUTPATIENT
Start: 2023-09-06 | End: 2023-09-06

## 2023-09-06 RX ADMIN — Medication 20 MG: at 08:31

## 2023-09-06 RX ADMIN — CHLORHEXIDINE GLUCONATE 15 ML: 1.2 RINSE ORAL at 08:31

## 2023-09-06 RX ADMIN — CHLORHEXIDINE GLUCONATE 15 ML: 1.2 RINSE ORAL at 21:00

## 2023-09-06 RX ADMIN — HEPARIN SODIUM 18 UNITS/KG/HR: 10000 INJECTION, SOLUTION INTRAVENOUS at 18:53

## 2023-09-06 RX ADMIN — THIAMINE HCL TAB 100 MG 100 MG: 100 TAB at 08:31

## 2023-09-06 RX ADMIN — ALBUTEROL SULFATE 2.5 MG: 2.5 SOLUTION RESPIRATORY (INHALATION) at 07:15

## 2023-09-06 RX ADMIN — SODIUM CHLORIDE SOLN NEBU 3% 4 ML: 3 NEBU SOLN at 14:41

## 2023-09-06 RX ADMIN — SODIUM CHLORIDE SOLN NEBU 3% 4 ML: 3 NEBU SOLN at 07:25

## 2023-09-06 RX ADMIN — SODIUM CHLORIDE SOLN NEBU 3% 4 ML: 3 NEBU SOLN at 03:26

## 2023-09-06 RX ADMIN — HEPARIN SODIUM 20 UNITS/KG/HR: 10000 INJECTION, SOLUTION INTRAVENOUS at 00:03

## 2023-09-06 RX ADMIN — LIDOCAINE HYDROCHLORIDE 2 ML: 10 INJECTION, SOLUTION EPIDURAL; INFILTRATION; INTRACAUDAL; PERINEURAL at 15:25

## 2023-09-06 RX ADMIN — SODIUM CHLORIDE SOLN NEBU 3% 4 ML: 3 NEBU SOLN at 20:27

## 2023-09-06 RX ADMIN — ALBUTEROL SULFATE 2.5 MG: 2.5 SOLUTION RESPIRATORY (INHALATION) at 20:27

## 2023-09-06 RX ADMIN — ALBUTEROL SULFATE 2.5 MG: 2.5 SOLUTION RESPIRATORY (INHALATION) at 14:41

## 2023-09-06 RX ADMIN — FENTANYL CITRATE 50 MCG: 50 INJECTION, SOLUTION INTRAMUSCULAR; INTRAVENOUS at 00:22

## 2023-09-06 RX ADMIN — FLUOXETINE 10 MG: 10 CAPSULE ORAL at 08:31

## 2023-09-06 RX ADMIN — CEFTRIAXONE 2000 MG: 2 INJECTION, SOLUTION INTRAVENOUS at 11:03

## 2023-09-06 RX ADMIN — ALBUTEROL SULFATE 2.5 MG: 2.5 SOLUTION RESPIRATORY (INHALATION) at 03:26

## 2023-09-06 NOTE — ASSESSMENT & PLAN NOTE
Hx of hypertension. PTA was on Torsemide 20 mg daily  · Echo this admission showed EF 60%, normal LV thickness, no systolic or diastolic dysfunction  · Hypotension s/p intubation initially requiring Levophed DC'ed on 8/27  · BP's stable.  MAP 70s-80s

## 2023-09-06 NOTE — ASSESSMENT & PLAN NOTE
Patient presented with hypoxia, found to have right lower lobe segmental PE. Initially saturating well on nasal cannula. Rapid response was called 8/24 secondary to hypoxia and altered mental status requiring emergent intubation. Hypoxic post intubation requiring high ventilator settings. · CTA PE study 8/23: Pulmonary embolism in the right lower lobe segmental pulmonary artery. Measured RV/LV ratio is within normal limits at less than 0.9.  · Initial CXR post intubation: Near complete opacity of the left lung likely related to worsening infiltrate and/or atelectasis. Patchy right upper lobe infiltrates. · Strep pneumo/legionella urine antigen negative  · COVID/Flu/RSV negative  · 8/25: Bronchoscopy: poorly tolerated secondary to hypoxia. Some mucus plugging present on the left. · 8/26: CT chest: Complete right lower lobe and near complete left lower lobe atelectasis. Pneumonia not excluded in the appropriate clinical setting. Mild interstitial edema with trace effusions  · Received diuresis with IV Lasix   · CXR 9/5: Improved aeration of the bilateral lungs with mild bibasilar atelectasis. · Completed Zosyn x 7 days (8/25-8/31) - MRSA nares neg  · Tolerates pressure support during the day, but still requires full vent support HS secondary to tachypnea. Tolerated settings of 8/6 on 9/5 for a few hours    Plan:  · Current vent settings: AC/VC 14/500/40/6  · Day 14 on Vent  · Continues to be weak. Unclear if etiology is muscular vs psychological underlying depression contributing to weakness. Patient encouraged to move extremities to build strength for extubation. · NIF ranging from -26 to -31  · Goals of care discussion on 8/30 with Dr. Jeremiah Barrios, mother, and patient at bedside  · Patient and loved ones agreed to Trach/PEG given no improvement on vent  · Trach/PEG planned for today Wednesday 9/6 after LP. NPO since midnight.  Heparin gtt held at 4:21AM  · Wean Fio2 for goal Sp02 >90  · Continue pulmonary hygiene   · VAP ppx

## 2023-09-06 NOTE — ASSESSMENT & PLAN NOTE
Previous echocardiogram 6/2/23 showed EF 42%, normal diastolic dysfunction, mild left ventricular hypertrophy, mild RVSP elevation and mild TR  · PTA Med: Torsemide 20 mg daily  · Evaluated by Heart Failure team in July 2023.  LVH thought to be secondary to hypertension, obesity, hx of meth use  · Recommended sleep study at that time   · Echo this admission shows normal EF of 60%, normal LV thickness, no diastolic dysfunction

## 2023-09-06 NOTE — PROGRESS NOTES
[address]  Progress Note  Name: Ladan Luque  MRN: [id number]  Unit/Bed#: ICU 03 I Date of Admission: 8/24/2023   Date of Service: 9/6/2023 I Hospital Day: 13    Assessment/Plan   * Acute respiratory failure with hypoxia [facility])  Assessment & Plan  Patient presented with hypoxia, found to have right lower lobe segmental PE. Initially saturating well on nasal cannula. Rapid response was called 8/24 secondary to hypoxia and altered mental status requiring emergent intubation. Hypoxic post intubation requiring high ventilator settings. · CTA PE study 8/23: Pulmonary embolism in the right lower lobe segmental pulmonary artery. Measured RV/LV ratio is within normal limits at less than 0.9.  · Initial CXR post intubation: Near complete opacity of the left lung likely related to worsening infiltrate and/or atelectasis. Patchy right upper lobe infiltrates. · Strep pneumo/legionella urine antigen negative  · COVID/Flu/RSV negative  · 8/25: Bronchoscopy: poorly tolerated secondary to hypoxia. Some mucus plugging present on the left. · 8/26: CT chest: Complete right lower lobe and near complete left lower lobe atelectasis. Pneumonia not excluded in the appropriate clinical setting. Mild interstitial edema with trace effusions  · Received diuresis with IV Lasix   · CXR 9/5: Improved aeration of the bilateral lungs with mild bibasilar atelectasis. · Completed Zosyn x 7 days (8/25-8/31) - MRSA nares neg  · Tolerates pressure support during the day, but still requires full vent support HS secondary to tachypnea. Tolerated settings of 8/6 on 9/5 for a few hours    Plan:  · Current vent settings: AC/VC 14/500/40/6  · Day 14 on Vent  · Continues to be weak. Unclear if etiology is muscular vs psychological underlying depression contributing to weakness. Patient encouraged to move extremities to build strength for extubation.   · NIF ranging from -26 to -31  · Goals of care discussion on 8/30 with  Alis, mother, and patient at bedside  · Patient and loved ones agreed to Trach/PEG given no improvement on vent  · Trach/PEG planned for today Wednesday 9/6 after LP. NPO since midnight. Heparin gtt held at 4:21AM  · Wean Fio2 for goal Sp02 >90  · Continue pulmonary hygiene   · VAP ppx    Toxic metabolic encephalopathy  Assessment & Plan  Patient initially presented to [facility] on 8/23 with hypoxia and encephalopathy. Acute change in GCS prompted rapid response and subsequent intubation for airway protection. Of note, pt w/ recent hospitalization to B 8/11-8/17 for encephalopathy with unclear etiology despite extensive workup but Wernicke's encephalopathy was on the differential.    ·  Alsey workup 8/11:  · Rapid response and intubation on 8/12 for airway protection, extubated on 8/13  · CT head 8/11: No acute intracranial abnormality. Stable small bilateral subcortical hypoattenuating foci. No associated mass effect. · MRI 8/12: No acute intracranial abnormality. Stable nonspecific enhancement along the anterior floor of the third ventricle/hypothalamus compared to March 2023 study. Findings below:  · Differentials includes chronic toxic metabolic insult (thiamine deficiency), chronic inflammatory/infectious conditions  · Other considerations include Langerhans cell cell histiocytosis, lymphocytic hypophysitis, and neurosyphilis. · Primary CNS lymphoma less likely consideration, given the lack of interval change. Serologic correlation is recommended. Stable cerebral white matter signal abnormality, presumably the sequela of chronic microangiopathic disease given the patient's history  · VEEG 8/12: negative for seizure activity.  Possible findings relating to underlying sleep disorder   · LP: Grossly negative overall  · Meningitis/encephalitis panel negative, bacterial/fungal culture and gram negative, lyme and cryptococcal negative  · Paraneoplastic panel neg  · Unable to obtain CSF VDRL  · DSPO: Treated with high dose thiamine. Psych evaluated, possible bipolar disorder. Discharged to home with his sister with visiting nurses, speech therapy, PT/OT  ·  Kriss ([place]) 8/23: Presented with hypoxia found by visiting nurse SPO2 in 80's  · 8/24: Patient's girlfriend visited [facility] and found him unresponsive. Notes said that 'eyes were rolled back with secretions around his mouth"  · Rapid response called and intubated for hypoxia and low GCS. VBG ? Post intubation 7.25/76/59/33  · Transferred to [facility] on 8/24  · [facility] 8/24 (Current hospital course)  · CT head 8/24: No acute intracranial abnormality  · 8/25 EEG: No evidence of electrographic seizures. Mild background slowing suggestive of underlying cerebral dysfunction from toxic/metablic/infectious/hypoxic encephalopathy. Clinical correlation is recommended  · On neurological exam: wakes to voice, weakly follows one step commands with all extremities   · Continues to be weak off sedation    Plan:  · Mentation now improved but remains with significant generalized weakness   · Neurology consulted, recommendations as follows:  · Somnolence has been going on for a while, some neuro symptoms after chemo in march. · Ach R Ab negative   · Serum anti-TPO and DARIN Ab's, RPR, neg. CSF paraneoplastic panel neg  · Antibiotics: Ceftriaxone started to cover for possible neurosyphillis for 7 more days  · Ceftriaxone Day 6 (9/1-9/7)  · Plan for repeat LP today 9/6, prior to Trach/PEG. Heparin gtt held at 4:21AM  · Psych input? · Continue thiamine  · CAM ICU  · Sleep hygiene  · Neurochecks per routine     Acute pulmonary embolism without acute cor pulmonale (HCC)  Assessment & Plan  Presented with hypoxia, initially admitted to the floor on supplemental NC  · CTA PE: Pulmonary embolism in the right lower lobe segmental pulmonary artery. Measured RV/LV ratio is within normal limits at less than 0.9.   · BNP 9, initial HS troponin 9  · Initiated on heparin gtt, transitioned to therapeutic Lovenox on 8/26  · Developed worsening hypoxia 8/24, rapid response was called and patient was intubated for airway protection and hypoxic  · Now requiring high vent setting  · Echo completed 8/25: Left Ventricle: Left ventricular cavity size is normal. Wall thickness is normal. The left ventricular ejection fraction is 60% by visual estimation. . Wall motion is normal. Diastolic function is normal for age. Right Ventricle: Right ventricular cavity size is normal. Systolic function is normal. Normal tricuspid annular plane systolic excursion (TAPSE) > 1.7 cm. Plan:  · 8/26: Heparin converted to therapeutic lovenox on 8/26  · 9/6: Therapeutic lovenox transitioned to Heparin gtt in lenora-procedural period   · Heparin gtt held at 4:21AM for LP, followed by Trach/PEG today 9/6  · Plan to transition to [address] after trach/PEG. Will need for 3 months on DC  · Dopplers of LE neg for DVT bilaterally    History of LVH (left ventricular hypertrophy)  Assessment & Plan  Previous echocardiogram 6/2/23 showed EF 51%, normal diastolic dysfunction, mild left ventricular hypertrophy, mild RVSP elevation and mild TR  · PTA Med: Torsemide 20 mg daily  · Evaluated by Heart Failure team in July 2023. LVH thought to be secondary to hypertension, obesity, hx of meth use  · Recommended sleep study at that time   · Echo this admission shows normal EF of 60%, normal LV thickness, no diastolic dysfunction    Depression  Assessment & Plan  · Continue Fluoxetine 10 mg daily  · Evaluated by psychiatry last admission, felt to have depressive mood disorder  · Possibly contributing to weakness on vent despite being off sedation  · Repeat psych evaluation, if able considering patient is intubated    Seminoma of left testis [facility])  Assessment & Plan  · Testicular cancer s/p left orchiectomy on 12/2022  · Repeat CT scan in January 2023 with enlarging lymph node.   · Began Chemo in March 2023: Etoposide and Cisplatin with plan for 4 cycles, 5 days every 21 days  · Did not complete chemo treatment due to adverse effects  · Stopped Cisplatin after 1 cycle due to painful neuropathy  · Continued Etoposide 2nd cycle  · Resumed Cisplatin and Etoposide 3rd cycle  · After 3rd cycle reported double vision, labile affect and right sided weakness and therefore chemotherapy was stopped  · Stopped chemo on 5/26/23  · MRI demonstrated white matter changes at that time and he was referred to Neurology   · Continue outpatient follow up with Heme/Onc     Hypertension  Assessment & Plan  Hx of hypertension. PTA was on Torsemide 20 mg daily  · Echo this admission showed EF 60%, normal LV thickness, no systolic or diastolic dysfunction  · Hypotension s/p intubation initially requiring Levophed DC'ed on 8/27  · BP's stable. MAP 44Z-09S    Umbilical hernia without obstruction and without gangrene  Assessment & Plan  · Umbilical hernia present, easily reducible              ICU Core Measures     Vented Patient  VAP Bundle  VAP bundle ordered     A: Assess, Prevent, and Manage Pain · Has pain been assessed? Yes  · Need for changes to pain regimen? No   B: Both Spontaneous Awakening Trials (SATs) and Spontaneous Breathing Trials (SBTs) · Plan to perform spontaneous awakening trial today? Yes   · Plan to perform spontaneous breathing trial today? Yes   · Obvious barriers to extubation? No   C: Choice of Sedation · RASS Goal: 0 Alert and Calm  · Need for changes to sedation or analgesia regimen? No   D: Delirium · CAM-ICU: Unable to perform secondary to Acute cognitive dysfunction   E: Early Mobility  · Plan for early mobility? Yes   F: Family Engagement · Plan for family engagement today? Yes       Antibiotic Review: Continue Ceftraixone D6/7    Review of Invasive Devices:        Prophylaxis:  VTE Contraindicated secondary to:  Hep gtt Held for LP and Trach/Peg   Stress Ulcer  covered byomeprazole (PRILOSEC) suspension 2 mg/mL [[id number]]       Subjective HPI/24hr events: Patient seen and examined at bedside. Awake and alert. Remained on AC/VC for procedures today. Low grade temp 100.8 overnight. Heparin gtt held at 4:21AM for LP today followed by trach/PEG. NPO. Urine outpt 400 ml, net fluid balance 24h +734. Review of Systems   Unable to perform ROS: Intubated        Objective                            Vitals I/O      Most Recent Min/Max in 24hrs   Temp 99.1 °F (37.3 °C) Temp  Min: 98.6 °F (37 °C)  Max: 100.8 °F (38.2 °C)   Pulse 96 Pulse  Min: 80  Max: 96   Resp (!) 27 Resp  Min: 1  Max: 28   /74 BP  Min: 98/64  Max: 138/85   O2 Sat 96 % SpO2  Min: 95 %  Max: 100 %      Intake/Output Summary (Last 24 hours) at 9/6/2023 0630  Last data filed at 9/5/2023 1801  Gross per 24 hour   Intake 1134.38 ml   Output 400 ml   Net 734.38 ml         Diet NPO     Invasive Monitoring Physical exam    Physical Exam  Eyes:      Extraocular Movements: Extraocular movements intact. Conjunctiva/sclera: Conjunctivae normal.   Skin:     General: Skin is warm and dry. Cardiovascular:      Rate and Rhythm: Normal rate and regular rhythm. Heart sounds: Normal heart sounds. Musculoskeletal:      Right lower leg: No edema. Left lower leg: No edema. Abdominal:      General: Bowel sounds are normal. There is no distension. Palpations: Abdomen is soft. Tenderness: There is no abdominal tenderness. Hernia: A hernia is present. Constitutional:       General: He is not in acute distress. Appearance: He is not ill-appearing. Interventions: He is intubated and restrained. Pulmonary:      Effort: He is intubated. Breath sounds: Rhonchi present. No wheezing. Neurological:      Mental Status: He is alert. He is calm. Comments: Moving all extremities, weak and slow movements. Following commands. Genitourinary/Anorectal:  external catheterVitals reviewed.             Diagnostic Studies      Imaging:  I have personally reviewed pertinent reports.    and I have personally reviewed pertinent films in PACS     Medications:  Scheduled PRN   albuterol, 2.5 mg, Q6H  cefTRIAXone, 2,000 mg, Q24H  chlorhexidine, 15 mL, Q12H ISAEL  FLUoxetine, 10 mg, Daily  omeprazole (PRILOSEC) suspension 2 mg/mL, 20 mg, Daily  sodium chloride, 4 mL, Q6H  thiamine, 100 mg, Daily      acetaminophen, 650 mg, Q4H PRN  fentanyl citrate (PF), 50 mcg, Q1H PRN  polyethylene glycol, 17 g, Daily PRN  senna, 17.6 mg, Daily PRN       Continuous          Labs:    CBC    Recent Labs     09/05/23 0600 09/06/23  0506   WBC 8.56 8.66   HGB 11.4* 11.6*   HCT 35.5* 37.2    255     BMP    Recent Labs     09/05/23 0600 09/06/23  0506   SODIUM 146 146   K 3.9 4.0   * 110*   CO2 30 29   AGAP 7 7   BUN 17 15   CREATININE 0.80 0.79   CALCIUM 9.3 9.3       Coags    Recent Labs     09/05/23  0607 09/05/23  1409 09/05/23 2018 09/06/23  0506   INR 1.06  --   --  1.07   PTT  --  28 59*  --         Additional Electrolytes  Recent Labs     09/05/23 0600 09/06/23  0506   MG 2.4 2.4   PHOS  --  3.8   CAIONIZED 1.15  --           Blood Gas    No recent results  No recent results LFTs  No recent results    Infectious  No recent results  Glucose  Recent Labs     09/05/23  0600 09/06/23  0506   GLUC 97 92               Gerhardt Bombard, MD

## 2023-09-06 NOTE — ANESTHESIA PREPROCEDURE EVALUATION
Procedure:  TRACHEOSTOMY WITH INSERTION PEG TUBE (Throat)    Relevant Problems   ANESTHESIA (within normal limits)      CARDIO  8/25/23 - TTE EF 60%   (+) Hypertension   (+) Pure hypertriglyceridemia      NEURO/PSYCH   (+) Anxiety   (+) Depression   (+) Diplopia      PULMONARY  left lung nearly white out post-intubation, right side has patchy opacities   (+) Acute respiratory failure with hypoxia (HCC)      Nervous and Auditory   (+) Toxic metabolic encephalopathy      Genitourinary   (+) 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HCC)   (+) Testicular cancer ([address])      Other   (+) Port-A-Cath in place   (+) Retroperitoneal lymphadenopathy        Physical Exam    Airway  Comment: intubated  Mallampati score: unable to assess         Dental       Cardiovascular  Cardiovascular exam normal    Pulmonary  Pulmonary exam normal     Other Findings        Anesthesia Plan  ASA Score- 4     Anesthesia Type- general with ASA Monitors. Additional Monitors:   Airway Plan:     Comment: Patient previously intubated. Induction with inhalational and IV medications. Paralysis, will bypass PACU and return directly to ICU. Plan Factors-Exercise tolerance (METS): <4 METS. Chart reviewed. Imaging results reviewed. Existing labs reviewed. Patient is not a current smoker. Induction- intravenous and inhalational.    Postoperative Plan- Plan for postoperative opioid use. Informed Consent- Anesthetic plan and risks discussed with patient, mother and legal guardian. I personally reviewed this patient with the CRNA. Discussed and agreed on the Anesthesia Plan with the CRNA. Dean Pettit

## 2023-09-06 NOTE — BRIEF OP NOTE (RAD/CATH)
INTERVENTIONAL RADIOLOGY PROCEDURE NOTE    Date: 9/6/2023    Procedure: Fluoroscopic guided lumbar puncture    Preoperative diagnosis:   1. History of Wernicke's encephalopathy    2. Encephalopathy    3. Pneumonia    4. Acute respiratory failure with hypoxia (HCC)    5. Toxic metabolic encephalopathy         Postoperative diagnosis: Same. Surgeon: Arabella Manuel MD     Assistant: None. No qualified resident was available. Blood loss: None    Specimens: Clear CSF sent to the laboratory     Findings: Fluoroscopic guided lumbar puncture performed with clear CSF obtained. Complications: None immediate.     Anesthesia: local

## 2023-09-06 NOTE — OCCUPATIONAL THERAPY NOTE
OT EVALUATION       09/06/23 1327   OT Last Visit   OT Visit Date 09/06/23   Note Type   Note type Evaluation   Pain Assessment   Pain Assessment Tool 0-10   Pain Score No Pain   Restrictions/Precautions   Other Precautions Chair Alarm; Bed Alarm; Fall Risk;O2;Multiple lines  (intubed but awake)   Home Living   Type of [facility]  Two level  (several ADIEL)   Additional Comments pt unable to state on vent in ICU, able to nod head to yes and no questions   Prior Function   Level of [place] Needs assistance with ADLs; Needs assistance with functional mobility; Needs assistance with IADLS   Lives With Significant other   Receives Help From Family;Home health   Comments As per documentation patient has been requiring assist at home with extensive medical history including blindness, PE, TBI   ADL   Eating Assistance 1  Total Assistance   Grooming Assistance 3  Moderate Assistance   UB Bathing Assistance 1  Total Assistance    [address] 1  Total [place] 1  Total Assistance   LB Dressing Assistance 1  Total [address]  1  Total Assistance   Bed Mobility   Rolling R 2  Maximal assistance   Rolling L 2  Maximal assistance   Transfers   Sit to Stand Unable to assess   Activity Tolerance   Activity Tolerance Treatment limited secondary to medical complications (Comment)  (plan for trach and PEG on 9/8/23)   Nurse Made Aware yes, Ward Cord Assessment   RUE Assessment WFL  (grossly 4-/5 MMT)   LUE Assessment   LUE Assessment WFL  (grossly 4-/5 MMT, blister present between 1st and 2nd digits, nurse aware)   Cognition   Overall Cognitive Status Unable to assess   Arousal/Participation Alert   Attention Within functional limits   Following Commands Follows multistep commands with increased time or repetition   Comments able to nod head yes and no to questions, able to follow commands   Assessment   Limitation Decreased ADL status; Decreased UE strength;Decreased Safe judgement during ADL;Decreased endurance;Decreased self-care trans;Decreased high-level ADLs  (decreased balance and mobility)   Prognosis Fair   Assessment Patient evaluated by Occupational Therapy. Patient admitted with Acute respiratory failure with hypoxia ([address]). The patients occupational profile, medical and therapy history includes a extensive additional review of physical, cognitive, or psychosocial history related to current functional performance. Comorbidities affecting functional mobility and ADLS include: anxiety, Bipolar disorder, cancer and depression. Prior to admission, patient was independent with functional mobility without assistive device and independent with ADLS. The evaluation identifies the following performance deficits: weakness, impaired balance, decreased endurance, increased fall risk, new onset of impairment of functional mobility, decreased ADLS, decreased IADLS, decreased activity tolerance, decreased safety awareness, impaired judgement and decreased strength, that result in activity limitations and/or participation restrictions. This evaluation requires clinical decision making of high complexity, because the patient presents with comorbidites that affect occupational performance and required significant modification of tasks or assistance with consideration of multiple treatment options. The Barthel Index was used as a functional outcome tool presenting with a score of Barthel Index Score: 0, indicating marked limitations of functional mobility and ADLS. The patient's raw score on the -PAC Daily Activity Inpatient Short Form is 7. A raw score of less than 19 suggests the patient may benefit from discharge to post-acute rehabilitation services. Please refer to the recommendation of the Occupational Therapist for safe discharge planning.   Patient will benefit from skilled Occupational Therapy services to address above deficits and facilitate a safe return to prior level of function. Goals   Patient Goals unable to state as pt is intubated   STG Time Frame   (1-7 days)   Short Term Goal  Goals established to promote Patient Goals: unable to state as pt is intubated:  Eating: mod assist; Grooming: min assist seated; Bathing: mod assist; Upper Body Dressing mod assist; Lower Body Dressing: mod assist; Toileting: mod assist; Patient will increase ambulatory standard toilet transfer to mod assist with rolling walker to increase performance and safety with ADLS and functional mobility; Patient will increase standing tolerance to 2 minutes during ADL task to decrease assistance level and decrease fall risk; Patient will increase bed mobility to mod assist in preparation for ADLS and transfers; Patient will increase functional mobility to and from bathroom with rolling walker with mod assist to increase performance with ADLS and to use a toilet; Patient will tolerate 5 minutes of UE ROM/strengthening to increase general activity tolerance and performance in ADLS/IADLS; Patient will improve functional activity tolerance to 10 minutes of sustained functional tasks to increase participation in basic self-care and decrease assistance level;   Patient will increase dynamic sitting balance to fair- to improve the ability to sit at edge of bed or on a chair for ADLS;  Patient will increase dynamic standing balance to poor+ to improve postural stability and decrease fall risk during standing ADLS and transfers. LTG Time Frame   (8-14 days)   Long Term Goal Eating: min assist; Grooming: supervision seated; Bathing: min assist; Upper Body Dressing min assist; Lower Body Dressing: min assist; Toileting: min assist; Patient will increase ambulatory standard toilet transfer to min assist with rolling walker to increase performance and safety with ADLS and functional mobility;  Patient will increase standing tolerance to 4 minutes during ADL task to decrease assistance level and decrease fall risk; Patient will increase bed mobility to min assist in preparation for ADLS and transfers; Patient will increase functional mobility to and from bathroom with rolling walker with min assist to increase performance with ADLS and to use a toilet; Patient will tolerate 10 minutes of UE ROM/strengthening to increase general activity tolerance and performance in ADLS/IADLS; Patient will improve functional activity tolerance to 20 minutes of sustained functional tasks to increase participation in basic self-care and decrease assistance level;   Patient will increase dynamic sitting balance to fair to improve the ability to sit at edge of bed or on a chair for ADLS;  Patient will increase dynamic standing balance to fair- to improve postural stability and decrease fall risk during standing ADLS and transfers. Pt will score >/= 12/24 on AM-PAC Daily Activity Inpatient scale to promote safe independence with ADLs and functional mobility; Pt will score >/= 30/100 on Barthel Index in order to decrease caregiver assistance needed and increase ability to perform ADLs and functional mobility. Plan   Treatment Interventions ADL retraining;Functional transfer training;UE strengthening/ROM; Endurance training;Patient/family training;Equipment evaluation/education; Activityengagement; Compensatory technique education   Goal Expiration Date 09/20/23   OT Frequency 3-5x/wk   Recommendation   OT Discharge Recommendation Post acute rehabilitation services   AM-PAC Daily Activity Inpatient   Lower Body Dressing 1   Bathing 1   Toileting 1   Upper Body Dressing 1   Grooming 2   Eating 1   Daily Activity Raw Score 7   Turning Head Towards Sound 4   Follow Simple Instructions 4   Low Function Daily Activity Raw Score 15   Low Function Daily Activity Standardized Score  26.28   AM-PAC Applied Cognition Inpatient   Following a Speech/Presentation 4   Understanding Ordinary Conversation 4   Taking Medications 3   Remembering Where Things Are Placed or Put Away 2   Remembering List of 4-5 Errands 2   Taking Care of Complicated Tasks 2   Applied Cognition Raw Score 17   Applied Cognition Standardized Score 36.52   Barthel Index   Feeding 0   Bathing 0   Grooming Score 0   Dressing Score 0   Bladder Score 0   Bowels Score 0   Toilet Use Score 0   Transfers (Bed/Chair) Score 0   Mobility (Level Surface) Score 0   Stairs Score 0   Barthel Index Score 0   Licensure   [place] License Number  [place] [address] [facility] OTR/L [id number]

## 2023-09-06 NOTE — WOUND OSTOMY CARE
Progress Note - Wound   Hannah Hendrix 41 y.o. male MRN: [id number]  Unit/Bed#: ICU 03 Encounter: [id number]      Assessment:   This is a follow up visit for this 41year old male patient admitted on 8/24/23 in acute respiratory failure d/t right lower lobe pulmonary embolus. He has a history of HTN, left ventricular hypertrophy, testicular cancer s/p L radical orchiectomy 12/2022, obesity and umbilical hernia. He was received in ICU bed ventilated via tracheostomy tube. He is totally dependent upon nursing staff for all of his care at this time. He has condom catheter to gravity and is incontinent of stool.      Assessment Findings:  The patient was unable to be turned due to having lumbar puncture done today. As of last visit, stage 3 pressure injury to right sacrobuttock was healed and orders remain in place for skin care and for prevention. As per primary RN, she will turn patient in 1/2 hr to check sacrobuttock area, give skin care and preventative care.     Plan:   Skin care plans:  1-Cleanse sacrobuttocks with foaming cleanser & pat dry. Apply Triad paste to entire sacrobuttock area in a thin layer 2x/day & prn soilage/dislodgement. 2-Cleanse blisters to left dorsal hand and left 2nd finger with NSS & pat dry. Open small Dermagran square & apply to blisters in a single layer cut to size. Cover with gauze, michele & tape. Change every other day & prn soilage/dislodgement. 3-Hydraguard to bilateral heels BID and PRN  4-Float heels on 2 pillows to offload pressure so heels are not in contact with mattress or pillows. 5-Ehob pressure redistribution cushion in chair when out of bed. Limit sitting for 1-2 hours at a time due to sacrobuttock breakdown then offload back in bed turning side to side every 2 hours.   6-Moisturize skin daily with skin nourishing cream.  7-Turn/reposition q2h or when medically stable for pressure re-distribution on skin.       Discussed assessment findings, and plan of care/recommendations with Joann JEONG.     Wound care will follow along with patient throughout admission, please call or tiger text with questions and concerns.     Recommendations written as orders.   Fiona Kline MSN, RN, Sriram Saleh

## 2023-09-06 NOTE — ASSESSMENT & PLAN NOTE
Presented with hypoxia, initially admitted to the floor on supplemental NC  · CTA PE: Pulmonary embolism in the right lower lobe segmental pulmonary artery. Measured RV/LV ratio is within normal limits at less than 0.9. · BNP 9, initial HS troponin 9  · Initiated on heparin gtt, transitioned to therapeutic Lovenox on 8/26  · Developed worsening hypoxia 8/24, rapid response was called and patient was intubated for airway protection and hypoxic  · Now requiring high vent setting  · Echo completed 8/25: Left Ventricle: Left ventricular cavity size is normal. Wall thickness is normal. The left ventricular ejection fraction is 60% by visual estimation. . Wall motion is normal. Diastolic function is normal for age. Right Ventricle: Right ventricular cavity size is normal. Systolic function is normal. Normal tricuspid annular plane systolic excursion (TAPSE) > 1.7 cm. Plan:  · 8/26: Heparin converted to therapeutic lovenox on 8/26  · 9/6: Therapeutic lovenox transitioned to Heparin gtt in lenora-procedural period   · Heparin gtt held at 4:21AM for LP, followed by Trach/PEG today 9/6  · Plan to transition to [address] after trach/PEG.  Will need for 3 months on DC  · Dopplers of LE neg for DVT bilaterally

## 2023-09-06 NOTE — ASSESSMENT & PLAN NOTE
Patient initially presented to [facility] on 8/23 with hypoxia and encephalopathy. Acute change in GCS prompted rapid response and subsequent intubation for airway protection. Of note, pt w/ recent hospitalization to [facility] 8/11-8/17 for encephalopathy with unclear etiology despite extensive workup but Wernicke's encephalopathy was on the differential.    ·  Cardinal workup 8/11:  · Rapid response and intubation on 8/12 for airway protection, extubated on 8/13  · CT head 8/11: No acute intracranial abnormality. Stable small bilateral subcortical hypoattenuating foci. No associated mass effect. · MRI 8/12: No acute intracranial abnormality. Stable nonspecific enhancement along the anterior floor of the third ventricle/hypothalamus compared to March 2023 study. Findings below:  · Differentials includes chronic toxic metabolic insult (thiamine deficiency), chronic inflammatory/infectious conditions  · Other considerations include Langerhans cell cell histiocytosis, lymphocytic hypophysitis, and neurosyphilis. · Primary CNS lymphoma less likely consideration, given the lack of interval change. Serologic correlation is recommended. Stable cerebral white matter signal abnormality, presumably the sequela of chronic microangiopathic disease given the patient's history  · VEEG 8/12: negative for seizure activity. Possible findings relating to underlying sleep disorder   · LP: Grossly negative overall  · Meningitis/encephalitis panel negative, bacterial/fungal culture and gram negative, lyme and cryptococcal negative  · Paraneoplastic panel neg  · Unable to obtain CSF VDRL  · DSPO: Treated with high dose thiamine. Psych evaluated, possible bipolar disorder. Discharged to home with his sister with visiting nurses, speech therapy, PT/OT  · [facility] 8/23: Presented with hypoxia found by visiting nurse SPO2 in 80's  · 8/24: Patient's girlfriend visited [facility] and found him unresponsive.  Notes said that 'eyes were rolled back with secretions around his mouth"  · Rapid response called and intubated for hypoxia and low GCS. VBG ? Post intubation 7.25/76/59/33  · Transferred to [facility] on 8/24  · SL Yomaira Mcclure 8/24 (Current hospital course)  · CT head 8/24: No acute intracranial abnormality  · 8/25 EEG: No evidence of electrographic seizures. Mild background slowing suggestive of underlying cerebral dysfunction from toxic/metablic/infectious/hypoxic encephalopathy. Clinical correlation is recommended  · On neurological exam: wakes to voice, weakly follows one step commands with all extremities   · Continues to be weak off sedation    Plan:  · Mentation now improved but remains with significant generalized weakness   · Neurology consulted, recommendations as follows:  · Somnolence has been going on for a while, some neuro symptoms after chemo in march. · Ach R Ab negative   · Serum anti-TPO and DARIN Ab's, RPR, neg. CSF paraneoplastic panel neg  · Antibiotics: Ceftriaxone started to cover for possible neurosyphillis for 7 more days  · Ceftriaxone Day 6 (9/1-9/7)  · Plan for repeat LP today 9/6, prior to Trach/PEG. Heparin gtt held at 4:21AM  · Psych input?     · Continue thiamine  · CAM ICU  · Sleep hygiene  · Neurochecks per routine

## 2023-09-06 NOTE — SEDATION DOCUMENTATION
22.5cc CSF fluid collected via LP by Dr. Edil Sullivan. Pt tolerated without issues.  Transported back to ICU on monitor with respiratory and RN

## 2023-09-07 LAB
25(OH)D3 SERPL-MCNC: 18.5 NG/ML (ref 30–100)
APTT PPP: 73 SECONDS (ref 23–37)
APTT PPP: 83 SECONDS (ref 23–37)
CK SERPL-CCNC: 71 U/L (ref 39–308)
GLUCOSE SERPL-MCNC: 96 MG/DL (ref 65–140)

## 2023-09-07 PROCEDURE — 94669 MECHANICAL CHEST WALL OSCILL: CPT

## 2023-09-07 PROCEDURE — 82948 REAGENT STRIP/BLOOD GLUCOSE: CPT

## 2023-09-07 PROCEDURE — 86787 VARICELLA-ZOSTER ANTIBODY: CPT | Performed by: NURSE PRACTITIONER

## 2023-09-07 PROCEDURE — 86235 NUCLEAR ANTIGEN ANTIBODY: CPT | Performed by: NURSE PRACTITIONER

## 2023-09-07 PROCEDURE — 86778 TOXOPLASMA ANTIBODY IGM: CPT | Performed by: NURSE PRACTITIONER

## 2023-09-07 PROCEDURE — 86777 TOXOPLASMA ANTIBODY: CPT | Performed by: NURSE PRACTITIONER

## 2023-09-07 PROCEDURE — 86038 ANTINUCLEAR ANTIBODIES: CPT | Performed by: NURSE PRACTITIONER

## 2023-09-07 PROCEDURE — 94003 VENT MGMT INPAT SUBQ DAY: CPT

## 2023-09-07 PROCEDURE — 94150 VITAL CAPACITY TEST: CPT

## 2023-09-07 PROCEDURE — 94640 AIRWAY INHALATION TREATMENT: CPT

## 2023-09-07 PROCEDURE — 94760 N-INVAS EAR/PLS OXIMETRY 1: CPT

## 2023-09-07 PROCEDURE — 99232 SBSQ HOSP IP/OBS MODERATE 35: CPT | Performed by: PSYCHIATRY & NEUROLOGY

## 2023-09-07 PROCEDURE — 85730 THROMBOPLASTIN TIME PARTIAL: CPT | Performed by: ANESTHESIOLOGY

## 2023-09-07 PROCEDURE — 30233S1 TRANSFUSION OF NONAUTOLOGOUS GLOBULIN INTO PERIPHERAL VEIN, PERCUTANEOUS APPROACH: ICD-10-PCS | Performed by: PSYCHIATRY & NEUROLOGY

## 2023-09-07 PROCEDURE — 99233 SBSQ HOSP IP/OBS HIGH 50: CPT | Performed by: ANESTHESIOLOGY

## 2023-09-07 PROCEDURE — 86430 RHEUMATOID FACTOR TEST QUAL: CPT | Performed by: NURSE PRACTITIONER

## 2023-09-07 PROCEDURE — 83520 IMMUNOASSAY QUANT NOS NONAB: CPT | Performed by: NURSE PRACTITIONER

## 2023-09-07 PROCEDURE — 82550 ASSAY OF CK (CPK): CPT | Performed by: NURSE PRACTITIONER

## 2023-09-07 RX ORDER — DIPHENHYDRAMINE HYDROCHLORIDE 50 MG/ML
50 INJECTION INTRAMUSCULAR; INTRAVENOUS DAILY
Status: DISCONTINUED | OUTPATIENT
Start: 2023-09-07 | End: 2023-09-09

## 2023-09-07 RX ORDER — METOCLOPRAMIDE HYDROCHLORIDE 5 MG/ML
10 INJECTION INTRAMUSCULAR; INTRAVENOUS DAILY
Status: COMPLETED | OUTPATIENT
Start: 2023-09-07 | End: 2023-09-11

## 2023-09-07 RX ORDER — ACETAMINOPHEN 325 MG/1
650 TABLET ORAL DAILY
Status: DISCONTINUED | OUTPATIENT
Start: 2023-09-07 | End: 2023-09-09

## 2023-09-07 RX ADMIN — ALBUTEROL SULFATE 2.5 MG: 2.5 SOLUTION RESPIRATORY (INHALATION) at 13:25

## 2023-09-07 RX ADMIN — DIPHENHYDRAMINE HYDROCHLORIDE 50 MG: 50 INJECTION, SOLUTION INTRAMUSCULAR; INTRAVENOUS at 15:27

## 2023-09-07 RX ADMIN — ALBUTEROL SULFATE 2.5 MG: 2.5 SOLUTION RESPIRATORY (INHALATION) at 08:11

## 2023-09-07 RX ADMIN — CHLORHEXIDINE GLUCONATE 15 ML: 1.2 RINSE ORAL at 20:23

## 2023-09-07 RX ADMIN — THIAMINE HCL TAB 100 MG 100 MG: 100 TAB at 08:46

## 2023-09-07 RX ADMIN — CHLORHEXIDINE GLUCONATE 15 ML: 1.2 RINSE ORAL at 08:46

## 2023-09-07 RX ADMIN — HEPARIN SODIUM 18 UNITS/KG/HR: 10000 INJECTION, SOLUTION INTRAVENOUS at 12:53

## 2023-09-07 RX ADMIN — METOCLOPRAMIDE 10 MG: 5 INJECTION, SOLUTION INTRAMUSCULAR; INTRAVENOUS at 15:28

## 2023-09-07 RX ADMIN — SODIUM CHLORIDE SOLN NEBU 3% 4 ML: 3 NEBU SOLN at 13:25

## 2023-09-07 RX ADMIN — SODIUM CHLORIDE SOLN NEBU 3% 4 ML: 3 NEBU SOLN at 19:52

## 2023-09-07 RX ADMIN — CEFTRIAXONE 2000 MG: 2 INJECTION, SOLUTION INTRAVENOUS at 12:31

## 2023-09-07 RX ADMIN — ALBUTEROL SULFATE 2.5 MG: 2.5 SOLUTION RESPIRATORY (INHALATION) at 03:00

## 2023-09-07 RX ADMIN — SODIUM CHLORIDE SOLN NEBU 3% 4 ML: 3 NEBU SOLN at 08:11

## 2023-09-07 RX ADMIN — Medication 20 MG: at 08:46

## 2023-09-07 RX ADMIN — Medication 42.5 G: at 16:04

## 2023-09-07 RX ADMIN — FLUOXETINE 10 MG: 10 CAPSULE ORAL at 08:46

## 2023-09-07 RX ADMIN — HEPARIN SODIUM 18 UNITS/KG/HR: 10000 INJECTION, SOLUTION INTRAVENOUS at 01:42

## 2023-09-07 RX ADMIN — ALBUTEROL SULFATE 2.5 MG: 2.5 SOLUTION RESPIRATORY (INHALATION) at 19:52

## 2023-09-07 RX ADMIN — SODIUM CHLORIDE SOLN NEBU 3% 4 ML: 3 NEBU SOLN at 03:00

## 2023-09-07 RX ADMIN — ACETAMINOPHEN 650 MG: 325 TABLET ORAL at 15:27

## 2023-09-07 NOTE — ASSESSMENT & PLAN NOTE
Previous echocardiogram 6/2/23 showed EF 88%, normal diastolic dysfunction, mild left ventricular hypertrophy, mild RVSP elevation and mild TR  · PTA Med: Torsemide 20 mg daily  · Evaluated by Heart Failure team in July 2023.  LVH thought to be secondary to hypertension, obesity, hx of meth use  · Recommended sleep study at that time   · Echo this admission shows normal EF of 60%, normal LV thickness, no diastolic dysfunction

## 2023-09-07 NOTE — ASSESSMENT & PLAN NOTE
Hx of hypertension.  PTA was on Torsemide 20 mg daily  · Echo this admission showed EF 60%, normal LV thickness, no systolic or diastolic dysfunction  · Hypotension s/p intubation initially requiring Levophed DC'ed on 8/27  · Patient has been normotensive

## 2023-09-07 NOTE — ASSESSMENT & PLAN NOTE
Presented with hypoxia, initially admitted to the floor on supplemental NC  · CTA PE: Pulmonary embolism in the right lower lobe segmental pulmonary artery. Measured RV/LV ratio is within normal limits at less than 0.9. · BNP 9, initial HS troponin 9  · Initiated on heparin gtt, transitioned to therapeutic Lovenox on 8/26  · Developed worsening hypoxia 8/24, rapid response was called and patient was intubated for airway protection and hypoxic  · Now requiring high vent setting  · Echo completed 8/25: Left Ventricle: Left ventricular cavity size is normal. Wall thickness is normal. The left ventricular ejection fraction is 60% by visual estimation. . Wall motion is normal. Diastolic function is normal for age. Right Ventricle: Right ventricular cavity size is normal. Systolic function is normal. Normal tricuspid annular plane systolic excursion (TAPSE) > 1.7 cm.     Plan:  · 8/26: Heparin converted to therapeutic lovenox on 8/26  · 9/6: Therapeutic lovenox transitioned to Heparin gtt in leonra-procedural period   · Hold heparin this AM for trach/PEG  · Dopplers of LE neg for DVT bilaterally

## 2023-09-07 NOTE — ASSESSMENT & PLAN NOTE
Patient presented with hypoxia 8/23, found to have right lower lobe segmental PE. Initially saturating well on nasal cannula. Rapid response was called 8/24 secondary to hypoxia and altered mental status requiring emergent intubation. Hypoxic post intubation requiring high ventilator settings. · CTA PE study 8/23: Pulmonary embolism in the right lower lobe segmental pulmonary artery. Measured RV/LV ratio is within normal limits at less than 0.9.  · Initial CXR post intubation: Near complete opacity of the left lung likely related to worsening infiltrate and/or atelectasis. Patchy right upper lobe infiltrates. · Strep pneumo/legionella urine antigen negative  · COVID/Flu/RSV negative  · 8/25: Bronchoscopy: poorly tolerated secondary to hypoxia. Some mucus plugging present on the left. · 8/26: CT chest: Complete right lower lobe and near complete left lower lobe atelectasis. Pneumonia not excluded in the appropriate clinical setting. Mild interstitial edema with trace effusions  · Received diuresis with IV Lasix   · CXR 9/5: Improved aeration of the bilateral lungs with mild bibasilar atelectasis. · Completed Zosyn x 7 days (8/25-8/31) - MRSA nares neg  · Tolerates pressure support during the day, but still requires full vent support HS secondary to tachypnea/secretions. Plan:  · Current vent settings: AC/VC 14/500/6/40%, PS 12/6 40% during the day  · Day 16 on Vent  · Continues to be weak.  Unclear if etiology is neurologic/neuromuscular vs psychological (?conversion disorder)  · NIF -8 to -14  · Trach/PEG planned for today  · Wean Fio2 for goal Sp02 >90  · Continue pulmonary hygiene   · VAP ppx; chlorhexidine, PPI, HOB greater than 30 degrees

## 2023-09-07 NOTE — ASSESSMENT & PLAN NOTE
Presented with hypoxia, initially admitted to the floor on supplemental NC  · CTA PE: Pulmonary embolism in the right lower lobe segmental pulmonary artery. Measured RV/LV ratio is within normal limits at less than 0.9. · BNP 9, initial HS troponin 9  · Initiated on heparin gtt, transitioned to therapeutic Lovenox on 8/26  · Developed worsening hypoxia 8/24, rapid response was called and patient was intubated for airway protection and hypoxic  · Now requiring high vent setting  · Echo completed 8/25: Left Ventricle: Left ventricular cavity size is normal. Wall thickness is normal. The left ventricular ejection fraction is 60% by visual estimation. . Wall motion is normal. Diastolic function is normal for age. Right Ventricle: Right ventricular cavity size is normal. Systolic function is normal. Normal tricuspid annular plane systolic excursion (TAPSE) > 1.7 cm.     Plan:  · 8/26: Heparin converted to therapeutic lovenox on 8/26  · 9/6: Therapeutic lovenox transitioned to Heparin gtt in lenora-procedural period   · Dopplers of LE neg for DVT bilaterally

## 2023-09-07 NOTE — ASSESSMENT & PLAN NOTE
Patient initially presented to [facility] on 8/23 with hypoxia and encephalopathy. Acute change in GCS prompted rapid response and subsequent intubation for airway protection. Of note, pt w/ [facility] for encephalopathy with unclear etiology despite extensive workup but Wernicke's encephalopathy was on the differential.    ·  [place] workup 8/11:  · Rapid response and intubation on 8/12 for airway protection, extubated on 8/13  · CT head 8/11: No acute intracranial abnormality. Stable small bilateral subcortical hypoattenuating foci. No associated mass effect. · MRI 8/12: No acute intracranial abnormality. Stable nonspecific enhancement along the anterior floor of the third ventricle/hypothalamus compared to March 2023 study. Findings below:  · Differentials includes chronic toxic metabolic insult (thiamine deficiency), chronic inflammatory/infectious conditions  · Other considerations include Langerhans cell cell histiocytosis, lymphocytic hypophysitis, and neurosyphilis. · Primary CNS lymphoma less likely consideration, given the lack of interval change. Serologic correlation is recommended. Stable cerebral white matter signal abnormality, presumably the sequela of chronic microangiopathic disease given the patient's history  · VEEG 8/12: negative for seizure activity. Possible findings relating to underlying sleep disorder   · LP: Grossly negative overall  · Meningitis/encephalitis panel negative, bacterial/fungal culture and gram negative, lyme and cryptococcal negative  · Paraneoplastic panel neg  · Unable to obtain CSF VDRL  · DSPO: Treated with high dose thiamine. Psych evaluated, possible bipolar disorder. Discharged to home with his sister with visiting nurses, speech therapy, PT/OT  · ASHLEY Bolanos 8/23: Presented with hypoxia found by visiting nurse SPO2 in 80's  · 8/24: Patient's girlfriend visited [facility] and found him unresponsive.  Notes said that 'eyes were rolled back with secretions around his mouth"  · Rapid response called and intubated for hypoxia and low GCS. VBG Post intubation 7.25/76/59/33  · Transferred to [facility] on 8/24  · ASHLEY Lipscomb 8/24 (Current hospital course)  · CT head 8/24: No acute intracranial abnormality  · 8/25 EEG: No evidence of electrographic seizures. Mild background slowing suggestive of underlying cerebral dysfunction from toxic/metablic/infectious/hypoxic encephalopathy. Clinical correlation is recommended  · On neurological exam: wakes to voice, follows 2 step commands   · Continues to be weak off sedation    Plan:  · Mentation now improved but remains with significant generalized weakness   · Neurology consulted, recommendations as follows:  · Somnolence has been going on for a while, some neuro symptoms after chemo in march. · Ach binding and blocking Ab negative   · MUSK antibody and ACHR modulating Ab pending   · Serum anti-TPO and DARIN Ab's, RPR, neg.  CSF paraneoplastic panel neg  · Varicella and toxoplasma antibodies pending   · Autoimmune labs pending   · [facility]GCC serum test pending   · Completed 7 days of Ceftriaxone on 9/7 to cover for possible neurosyphillis  · Repeat LP 9/6: gram stain negative, HSV negative  · Pending: VDRL, VZV PCR, ACE, culture  · 9/7 started IVIg x 5 days and pulse dose steroids x 3 days per neuro recommendations for possible autoimmune component to neuro symptoms   · New MRI ordered  · Continue thiamine  · CAM ICU  · Sleep hygiene  · Neurochecks per routine

## 2023-09-07 NOTE — PROGRESS NOTES
[address]  Progress Note  Name: Ladan Luque  MRN: [id number]  Unit/Bed#: ICU 03 I Date of Admission: 8/24/2023   Date of Service: 9/7/2023 I Hospital Day: 14    Assessment/Plan   * Acute respiratory failure with hypoxia [facility])  Assessment & Plan  Patient presented with hypoxia 8/23, found to have right lower lobe segmental PE. Initially saturating well on nasal cannula. Rapid response was called 8/24 secondary to hypoxia and altered mental status requiring emergent intubation. Hypoxic post intubation requiring high ventilator settings. · CTA PE study 8/23: Pulmonary embolism in the right lower lobe segmental pulmonary artery. Measured RV/LV ratio is within normal limits at less than 0.9.  · Initial CXR post intubation: Near complete opacity of the left lung likely related to worsening infiltrate and/or atelectasis. Patchy right upper lobe infiltrates. · Strep pneumo/legionella urine antigen negative  · COVID/Flu/RSV negative  · 8/25: Bronchoscopy: poorly tolerated secondary to hypoxia. Some mucus plugging present on the left. · 8/26: CT chest: Complete right lower lobe and near complete left lower lobe atelectasis. Pneumonia not excluded in the appropriate clinical setting. Mild interstitial edema with trace effusions  · Received diuresis with IV Lasix   · CXR 9/5: Improved aeration of the bilateral lungs with mild bibasilar atelectasis. · Completed Zosyn x 7 days (8/25-8/31) - MRSA nares neg  · Tolerates pressure support during the day, but still requires full vent support HS secondary to tachypnea/secretions. Plan:  · Current vent settings: AC/VC 14/500/6/40%, PS 12/6 40% during the day  · Day 15 on Vent  · Continues to be weak. Unclear if etiology is neurologic/neuromuscular vs psychological (conversion disorder)  · NIF -8   · Trach/PEG planned deferred yesterday d/t scheduling conflict.  Now plan for trach/PEG Monday  · Wean Fio2 for goal Sp02 >90  · Continue pulmonary hygiene   · VAP ppx; chlorhexidine, PPI, HOB greater than 30 degrees    Toxic metabolic encephalopathy  Assessment & Plan  Patient initially presented to [facility] on 8/23 with hypoxia and encephalopathy. Acute change in GCS prompted rapid response and subsequent intubation for airway protection. Of note, pt w/ recent hospitalization to [facility] 8/11-8/17 for encephalopathy with unclear etiology despite extensive workup but Wernicke's encephalopathy was on the differential.    ·  [place] workup 8/11:  · Rapid response and intubation on 8/12 for airway protection, extubated on 8/13  · CT head 8/11: No acute intracranial abnormality. Stable small bilateral subcortical hypoattenuating foci. No associated mass effect. · MRI 8/12: No acute intracranial abnormality. Stable nonspecific enhancement along the anterior floor of the third ventricle/hypothalamus compared to March 2023 study. Findings below:  · Differentials includes chronic toxic metabolic insult (thiamine deficiency), chronic inflammatory/infectious conditions  · Other considerations include Langerhans cell cell histiocytosis, lymphocytic hypophysitis, and neurosyphilis. · Primary CNS lymphoma less likely consideration, given the lack of interval change. Serologic correlation is recommended. Stable cerebral white matter signal abnormality, presumably the sequela of chronic microangiopathic disease given the patient's history  · VEEG 8/12: negative for seizure activity. Possible findings relating to underlying sleep disorder   · LP: Grossly negative overall  · Meningitis/encephalitis panel negative, bacterial/fungal culture and gram negative, lyme and cryptococcal negative  · Paraneoplastic panel neg  · Unable to obtain CSF VDRL  · DSPO: Treated with high dose thiamine. Psych evaluated, possible bipolar disorder.  Discharged to home with his sister with visiting nurses, speech therapy, PT/OT  · [facility] 8/23: Presented with hypoxia found by visiting nurse SPO2 in 80's  · 8/24: Patient's girlfriend visited [facility] and found him unresponsive. Notes said that 'eyes were rolled back with secretions around his mouth"  · Rapid response called and intubated for hypoxia and low GCS. VBG Post intubation 7.25/76/59/33  · Transferred to [facility] on 8/24  ·  Yessenia Cohen 8/24 (Current hospital course)  · CT head 8/24: No acute intracranial abnormality  · 8/25 EEG: No evidence of electrographic seizures. Mild background slowing suggestive of underlying cerebral dysfunction from toxic/metablic/infectious/hypoxic encephalopathy. Clinical correlation is recommended  · On neurological exam: wakes to voice, follows 2 step commands   · Continues to be weak off sedation    Plan:  · Mentation now improved but remains with significant generalized weakness   · Neurology consulted, recommendations as follows:  · Somnolence has been going on for a while, some neuro symptoms after chemo in march. · Ach binding and blocking Ab negative   · MUSK antibody and ACHR modulating Ab pending   · Serum anti-TPO and DARIN Ab's, RPR, neg. CSF paraneoplastic panel neg  · Antibiotics: Ceftriaxone started to cover for possible neurosyphillis (7 day treatment)   · Ceftriaxone Day 7/7 (9/1-9/7)  · Repeat LP 9/6: gram stain negative, HSV negative  · Pending: VDRL, VZV PCR, ACE, culture  · Continue thiamine  · CAM ICU  · Sleep hygiene  · Neurochecks per routine     Acute pulmonary embolism without acute cor pulmonale ([address])  Assessment & Plan  Presented with hypoxia, initially admitted to the floor on supplemental NC  · CTA PE: Pulmonary embolism in the right lower lobe segmental pulmonary artery. Measured RV/LV ratio is within normal limits at less than 0.9.   · BNP 9, initial HS troponin 9  · Initiated on heparin gtt, transitioned to therapeutic Lovenox on 8/26  · Developed worsening hypoxia 8/24, rapid response was called and patient was intubated for airway protection and hypoxic  · Now requiring high vent setting  · Echo completed 8/25: Left Ventricle: Left ventricular cavity size is normal. Wall thickness is normal. The left ventricular ejection fraction is 60% by visual estimation. . Wall motion is normal. Diastolic function is normal for age. Right Ventricle: Right ventricular cavity size is normal. Systolic function is normal. Normal tricuspid annular plane systolic excursion (TAPSE) > 1.7 cm. Plan:  · 8/26: Heparin converted to therapeutic lovenox on 8/26  · 9/6: Therapeutic lovenox transitioned to Heparin gtt in lenora-procedural period   · Dopplers of LE neg for DVT bilaterally    History of LVH (left ventricular hypertrophy)  Assessment & Plan  Previous echocardiogram 6/2/23 showed EF 85%, normal diastolic dysfunction, mild left ventricular hypertrophy, mild RVSP elevation and mild TR  · PTA Med: Torsemide 20 mg daily  · Evaluated by Heart Failure team in July 2023. LVH thought to be secondary to hypertension, obesity, hx of meth use  · Recommended sleep study at that time   · Echo this admission shows normal EF of 60%, normal LV thickness, no diastolic dysfunction    Depression  Assessment & Plan  · Continue Fluoxetine 10 mg daily  · Evaluated by psychiatry last admission, felt to have depressive mood disorder  · Possibly contributing to weakness on vent despite being off sedation  · Repeat psych evaluation, if able considering patient is intubated    Seminoma of left testis [facility])  Assessment & Plan  · Testicular cancer s/p left orchiectomy on 12/2022  · Repeat CT scan in January 2023 with enlarging lymph node.   · Began Chemo in March 2023: Etoposide and Cisplatin with plan for 4 cycles, 5 days every 21 days  · Did not complete chemo treatment due to adverse effects  · Stopped Cisplatin after 1 cycle due to painful neuropathy  · Continued Etoposide 2nd cycle  · Resumed Cisplatin and Etoposide 3rd cycle  · After 3rd cycle reported double vision, labile affect and right sided weakness and therefore chemotherapy was stopped  · Stopped chemo on 5/26/23  · MRI demonstrated white matter changes at that time and he was referred to Neurology   · Continue outpatient follow up with Heme/Onc     Hypertension  Assessment & Plan  Hx of hypertension. PTA was on Torsemide 20 mg daily  · Echo this admission showed EF 60%, normal LV thickness, no systolic or diastolic dysfunction  · Hypotension s/p intubation initially requiring Levophed DC'ed on 8/27  · BP's stable. MAP 99E-42V    Umbilical hernia without obstruction and without gangrene  Assessment & Plan  · Umbilical hernia present, easily reducible            ICU Core Measures     Vented Patient  VAP Bundle  VAP bundle ordered     A: Assess, Prevent, and Manage Pain · Has pain been assessed? Yes  · Need for changes to pain regimen? No   B: Both Spontaneous Awakening Trials (SATs) and Spontaneous Breathing Trials (SBTs) · Plan to perform spontaneous awakening trial today? Yes   · Plan to perform spontaneous breathing trial today? Yes   · Obvious barriers to extubation? Yes   C: Choice of Sedation · RASS Goal: N/A patient not on sedation  · Need for changes to sedation or analgesia regimen? NA   D: Delirium · CAM-ICU: Negative   E: Early Mobility  · Plan for early mobility? Yes   F: Family Engagement · Plan for family engagement today? Yes       Antibiotic Review: Patient on appropriate coverage based on culture data. Review of Invasive Devices:      Central access plan: right chest wall port unaccessed       Prophylaxis:  VTE VTE covered by:  heparin (porcine), Intravenous, 18 Units/kg/hr at 09/06/23 1853  heparin (porcine), Intravenous  heparin (porcine), Intravenous       Stress Ulcer  covered byomeprazole (PRILOSEC) suspension 2 mg/mL [[id number]]       Subjective   HPI/24hr events: s/p LP yesterday. Studies pending. Trach/PEG deferred until Monday d/t scheduling issues.  PS during the day yesterday, became tachypneic and with large amount of secretions last evening and was switched back to AC/VC settings overnight. Tmax 100. Review of Systems   Unable to perform ROS: Intubated            Objective                            Vitals I/O      Most Recent Min/Max in 24hrs   Temp 99.7 °F (37.6 °C) Temp  Min: 98.2 °F (36.8 °C)  Max: 100 °F (37.8 °C)   Pulse 86 Pulse  Min: 79  Max: 96   Resp 15 Resp  Min: 12  Max: 31   /69 BP  Min: 100/64  Max: 119/57   O2 Sat 98 % SpO2  Min: 94 %  Max: 100 %      Intake/Output Summary (Last 24 hours) at 9/7/2023 0115  Last data filed at 9/6/2023 1756  Gross per 24 hour   Intake 911.42 ml   Output 600 ml   Net 311.42 ml         Diet Enteral/Parenteral; Tube Feeding No Oral Diet; Jevity 1.2 Scott; Continuous; 70; Prosource Protein Liquid - One Packet Daily; 140; Water; Every 4 hours  Diet NPO     Invasive Monitoring Physical exam   N/A Physical Exam  Eyes:      General: Lids are normal.      Extraocular Movements: Extraocular movements intact. Conjunctiva/sclera: Conjunctivae normal.      Pupils: Pupils are equal, round, and reactive to light. Skin:     General: Skin is warm and dry. Capillary Refill: Capillary refill takes less than 2 seconds. HENT:      Head: Normocephalic and atraumatic. Neck:      Comments: Neck turned toward left, can weakly turn to right when asked but then moves back to left positionCardiovascular:      Rate and Rhythm: Normal rate and regular rhythm. Pulses: Normal pulses. Radial pulses are 2+ on the right side and 2+ on the left side. Dorsalis pedis pulses are 2+ on the right side and 2+ on the left side. Heart sounds: Normal heart sounds, S1 normal and S2 normal.   Musculoskeletal:      Cervical back: Rigidity present. Decreased range of motion. Comments: UE +3/5 LE +3/5    Abdominal:      General: Bowel sounds are normal.      Palpations: Abdomen is soft. Hernia: A hernia is present. Hernia is present in the umbilical area.    Constitutional:       Appearance: He is ill-appearing. Interventions: He is intubated and restrained. Pulmonary:      Effort: Pulmonary effort is normal. He is intubated. Breath sounds: Normal breath sounds. Psychiatric:         Mood and Affect: Affect is flat. Behavior: Behavior is cooperative. Neurological:      Mental Status: He is easily aroused. He is lethargic. GCS: GCS eye subscore is 4. GCS verbal subscore is 1. GCS motor subscore is 6.     '       Diagnostic Studies      No new imaging      Medications:  Scheduled PRN   albuterol, 2.5 mg, Q6H  cefTRIAXone, 2,000 mg, Q24H  chlorhexidine, 15 mL, Q12H ISAEL  FLUoxetine, 10 mg, Daily  omeprazole (PRILOSEC) suspension 2 mg/mL, 20 mg, Daily  sodium chloride, 4 mL, Q6H  thiamine, 100 mg, Daily      acetaminophen, 650 mg, Q4H PRN  fentanyl citrate (PF), 50 mcg, Q1H PRN  heparin (porcine), 10,000 Units, Q6H PRN  heparin (porcine), 5,000 Units, Q6H PRN  polyethylene glycol, 17 g, Daily PRN  senna, 17.6 mg, Daily PRN       Continuous    heparin (porcine), 3-30 Units/kg/hr (Order-Specific), Last Rate: 18 Units/kg/hr (09/06/23 1853)         Labs:    CBC    Recent Labs     09/05/23  0600 09/06/23  0506   WBC 8.56 8.66   HGB 11.4* 11.6*   HCT 35.5* 37.2    255     BMP    Recent Labs     09/05/23  0600 09/06/23  0506   SODIUM 146 146   K 3.9 4.0   * 110*   CO2 30 29   AGAP 7 7   BUN 17 15   CREATININE 0.80 0.79   CALCIUM 9.3 9.3       Coags    Recent Labs     09/05/23  0607 09/05/23  1409 09/05/23  2018 09/06/23  0506 09/06/23  1832   INR 1.06  --   --  1.07  --    PTT  --    < > 59*  --  25    < > = values in this interval not displayed.         Additional Electrolytes  Recent Labs     09/05/23  0600 09/06/23  0506   MG 2.4 2.4   PHOS  --  3.8   CAIONIZED 1.15  --           Blood Gas    No recent results  No recent results LFTs  No recent results    Infectious  No recent results  Glucose  Recent Labs     09/05/23  0600 09/06/23  0506   GLUC 97 92               Julissa AMBER Echavarria

## 2023-09-07 NOTE — QUICK NOTE
I updated the patient's mother Ileana Calzada today. She is aware that the patient had his LP performed yesterday but the trach/peg was rescheduled for tomorrow. All questions were answered.

## 2023-09-07 NOTE — PROGRESS NOTES
Neurology Consult Follow Up      Miguel Sherman is a 41 y.o. male  ICU 03/ICU 03    [id number]        Assessment/Recommendations:    1. Toxic metabolic encephalopathy  2. Acute respiratory failure with persistent weakness  3. Acute pulmonary embolism  4. Testicular seminoma  5. Sepsis   6. Proximal weakness     -Critical care team management  -Neuro assessments  -Aspiration precautions  -Critical care team management of respiratory, oncological and metabolic care  -IVIG 40.2 g IV daily x5 days  -Premedicate IVIG with Benadryl IV 50 mg daily x5 days  -We will trial IV Solu-Medrol 1 g daily x3 days monitor for blood sugars if patient diabetic  -Repeat LP completed on 9/6/2023, testing remains pending. Patient has normal WBC count and protein levels. Gram stain negative for polys or bacteria. Remaining studies are pending  -We will add labs for autoimmune including DG/ANCA/RAAnd will evaluate anti-Amarilys 1 antibody for possible myositis. Also assessing CK and vitamin D levels  -We will repeat MRI of the brain with without contrast add orbits peripheral examination considering ongoing weakness and onset of diplopia without known central cause  -Patient with little evidence of neuromuscular disease however continues to have significant proximal weakness as well as respiratory weakness rendering him unable to wean from the ventilator. We will consider a trial of IVIG regarding this whilst waiting additional CSF and lab work testing for confirmations.     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 Patient was brought to [facility] after presenting to [address] [place] [address] with hypoxia and altered mental status. He had previously been hospital to [facility] and had extensive work-up including an MRI of the brain with without contrast, LP, VEEG monitoring. June 2021, patient diagnosed with left testicular seminoma.   At first did not follow-up with urology until 2022 at which point showed lymphadenopathy and underwent a radical left orchiectomy 2022. Patient received chemotherapy initially, he was on cystoplasty and which caused him numbness and tingling sensations however continued on with the treatment until he developed diplopia with right-sided weakness and labile affect therefore his therapies were just discontinued. Patient had an MRI of the brain which showed a white matter changes. He was evaluated by neurology team at [facility] on 2023 obtain an MRI of the brain with without contrast which demonstrated nonspecific enhancements along the anterior floor of the third ventricle/hypothalamus when compared to his 2023 study. Patient was placed on vEEG which demonstrated intermittent excessive diffuse delta activity during drowsiness and excessive slow-wave and REM sleep periods. Patient had 2 episodes of leg and body jerking occurring during this test while in REM sleep periods suggestive of REM sleep atonia. Patient was diagnosed with differentials of Warnicke's encephalopathy and was started on IV thiamine, vitamin B1 level was 84.0. He underwent a LP, CSF testing with elevated protein and WBCs however remaining testing was negative including paraneoplastic panel. Patient discharged to home with nursing services however he was then found to have low saturations at home. He was brought to [address] [place] [address] 2023 and found to have a PE which she was started IV heparin drip. Patient had respiratory distress at that time, dropping his saturations and was intubated then was transferred to [facility] for critical care management. Patient has been seen by neurology at [facility], continued supportive care while awaiting his remaining CSF studies. Ultimately they remain negative. Attempted to add additional studies however his samples had . Serum studies were sent for RPR, this was negative. He had anti-TPO which was also negative. DARIN and MuSK antibody reflex studies were ordered and DARIN is within normal ranges, muscle study remains pending. ACH binding and blocking as well as modulating tests were also performed, these have returned negative. Patient is awake and remains intubated in the ICU. He is able to follow commands and reports ongoing issues with his vision including continued diplopia. Question if this is related to his chemotherapy agents versus something central.  He has continued to have significant weakness as well which waxes and wanes on a day-to-day basis. He has been attempting to have ventilatory weaning process however has failed his vent trials therefore has remained intubated over time. After discussions with his family they are planning for tracheostomy and PEG tube on 9/8/2023. Patient seen at bedside today, he does continue to have weakness in the lower extremities, difficulties holding his legs up off the bed, weakness in his hips compared to his lower legs. According to critical care, his NIF remain too low for extubation. Question etiology of his proximal weakness. All lab studies have returned negative thus far however must study remains pending. Repeat lumbar puncture was completed and WBCs and protein have decreased. Remaining studies are pending. Discussed with neurology MD at this point repeating his MRI with without contrast adding the orbits would not be unreasonable given his neurological changes and his ongoing issues with weakness. Also discussed possible autoimmune etiology, DG/ANCA and RF pending. Added anti-Jo1 testing for possible myositis however differential is of low suspicion. Discussed trial of Mestinon and/or steroids with neurology MD.  At this point in time does not appear as though the Mestinon would be strong enough to to be effective. Discussed possibility of an IVIG trial as well as IV steroids with Solu-Medrol.   Orders written patient receiving 42.5 g IV every 24 hours for the next 5 days with premedication with Benadryl 50 mg as well as IV Solu-Medrol 1 g daily x3 days.       Recommendations for outpatient neurological follow up have yet to be determined. Chief Complaint:    Subjective:   Patient remains intubated, he is not sedated and is awake. He is awake for this neurological exam.  He is able to nod yes and no other responses slightly slower due to coughing. Patient acknowledges significant weakness also notes ongoing issues with diplopia. Past Medical History:   Diagnosis Date   • Anxiety    • Cancer ([address])    • Depression    • Psychiatric disorder     bipolar     Social History     Socioeconomic History   • Marital status: Single     Spouse name: Not on file   • Number of children: Not on file   • Years of education: Not on file   • Highest education level: Not on file   Occupational History   • Not on file   Tobacco Use   • Smoking status: Former     Packs/day: 0.50     Years: 5.00     Total pack years: 2.50     Types: Cigarettes     Start date: 1/1/2008   • Smokeless tobacco: Never   Vaping Use   • Vaping Use: Never used   Substance and Sexual Activity   • Alcohol use: Not Currently     Comment: 2 cases of beer daily, stopped 3 years ago   • Drug use: Yes     Types: Marijuana     Comment: Medical marijuana   • Sexual activity: Not Currently   Other Topics Concern   • Not on file   Social History Narrative    ** Merged History Encounter **          Social Determinants of Health     Financial Resource Strain: Not on file   Food Insecurity: No Food Insecurity (8/29/2023)    Hunger Vital Sign    • Worried About Running Out of Food in the Last Year: Never true    • Ran Out of Food in the Last Year: Never true   Transportation Needs: No Transportation Needs (8/29/2023)    PRAPARE - Transportation    • Lack of Transportation (Medical): No    • Lack of Transportation (Non-Medical):  No   Physical Activity: Not on file   Stress: Not on file   Social Connections: Not on file   Intimate Partner Violence: Not on file   Housing Stability: Low Risk  (8/12/2023)    Housing Stability Vital Sign    • Unable to Pay for Housing in the Last Year: No    • Number of Places Lived in the Last Year: 1    • Unstable Housing in the Last Year: No     Family History   Problem Relation Age of Onset   • Coronary artery disease Maternal Aunt        ROS:  Patient unable to provide subjective feedback  Acknowledges being weak in the lower extremities compared to the right as well as having diplopia      Objective:  /71   Pulse 92   Temp 100 °F (37.8 °C) (Esophageal)   Resp 21   Ht 6' 4" (1.93 m)   Wt 136 kg (298 lb 15.1 oz)   SpO2 97%   BMI 36.39 kg/m²     General: alert   Mental status: Overall orientation is unable to be assessed due to intubation  Attention: normal  Knowledge: Unable to fully assess although patient does nod appropriately to yes no type questions  Language and Speech: Is unable to be assessed   cranial nerves:   CN II: Visual fields are unable to fully be assessed, patient reports diplopia. Fundoscopic exam is normal with sharp discs and no vascular changes. Pupils are 4 mm and briskly reactive to light. CN III, IV, VI: At primary gaze, there is no eye deviation. CN V: Facial sensation is intact in all 3 divisions bilaterally. Corneal responses are intact. CN VII: Face is symmetrical, with normal eye closure and smile. CN VIII: Hearing is normal to rubbing fingers  CN IX, X: Palate and phonation is is unable to be assessed  CN XI: Head turning and shoulder shrug are intact  CN XII: Tongue is unable to be assessed  Motor: There is no pronator drift of out-stretched arms although patient with equal weakness in bilateral upper extremities unable to hold both arms up for prolonged period of time. Muscle bulk and tone are normal.   Patient motor exam waxes and wanes. Lower extremities weaker today than previous exams. Muscle exam  Arm Right Left Leg Right Left   Deltoid 4/5 4/5 Iliopsoas 3+/5 3+/5   Biceps 4+/5 4+/5 Quads 4-/5 4-/5   Triceps 4/5 4/5 Hamstrings 4-/5 4-/5   Wrist Extension 4/5 4/5 Ankle Dorsi Flexion 4/5 4/5   Wrist Flexion 4/5 4/5 Ankle Plantar Flexion 4/5 4/5       Sensory: Patient acknowledges equal sensation to touch, temperature and vibration. Gait: Deferred while intubated  Coordination: Unable to fully assess, patient with diplopia and difficulties with finger-to-nose. Also lower extremity weakness difficulty with heel-to-shin     Reflexes    RJ BJ TJ KJ AJ Plantars Cornejo's   Right 2+ 2+  1+ 1+ Downgoing Not present   Left 2+ 2+  1+ 1+ Downgoing Not present      Heart: Regular rate and rhythm  Lung: Remains intubated positive secretions  Abd: soft, non-tender, non-distended   Skin: dry and intact    Labs:      Lab Results   Component Value Date    WBC 8.66 09/06/2023    HGB 11.6 (L) 09/06/2023    HCT 37.2 09/06/2023     (H) 09/06/2023     09/06/2023     Lab Results   Component Value Date    HGBA1C 5.2 12/10/2022     Lab Results   Component Value Date    ALT 55 (H) 08/26/2023    AST 23 08/26/2023    ALKPHOS 90 08/26/2023     Lab Results   Component Value Date    GLUCOSE 93 07/28/2023    CALCIUM 9.3 09/06/2023    K 4.0 09/06/2023    CO2 29 09/06/2023     (H) 09/06/2023    BUN 15 09/06/2023    CREATININE 0.79 09/06/2023         Review of reports and notes reveal:   Independent review of films/reports:  IR lumbar puncture    Result Date: 9/6/2023  Impression: Successful fluoroscopic-guided lumbar puncture. Workstation performed: [id number]     XR chest portable ICU    Result Date: 9/5/2023  Improved aeration of the bilateral lungs with mild bibasilar atelectasis. Workstation performed: [id number]     XR chest portable ICU    Result Date: 9/5/2023  Stable lines and tubes. Overall little change in appearance of the chest with low lung volumes and mild vascular prominence.  Workstation performed: [id number]         Thank you for this consult. Total time of encounter:  30 min  More than 50% of the time was used in counseling and/or coordination of care  Extent of couseling and/or coordination of care with patient at bedside.   Discussed with the medical team and attending neurology MD.

## 2023-09-07 NOTE — PROGRESS NOTES
[id number] [facility]  Progress Note  Name: Marylou Lucia  MRN: [id number]  Unit/Bed#: ICU 03 I Date of Admission: 8/24/2023   Date of Service: 9/8/2023 I Hospital Day: 15    Assessment/Plan   * Acute respiratory failure with hypoxia [facility])  Assessment & Plan  Patient presented with hypoxia 8/23, found to have right lower lobe segmental PE. Initially saturating well on nasal cannula. Rapid response was called 8/24 secondary to hypoxia and altered mental status requiring emergent intubation. Hypoxic post intubation requiring high ventilator settings. · CTA PE study 8/23: Pulmonary embolism in the right lower lobe segmental pulmonary artery. Measured RV/LV ratio is within normal limits at less than 0.9.  · Initial CXR post intubation: Near complete opacity of the left lung likely related to worsening infiltrate and/or atelectasis. Patchy right upper lobe infiltrates. · Strep pneumo/legionella urine antigen negative  · COVID/Flu/RSV negative  · 8/25: Bronchoscopy: poorly tolerated secondary to hypoxia. Some mucus plugging present on the left. · 8/26: CT chest: Complete right lower lobe and near complete left lower lobe atelectasis. Pneumonia not excluded in the appropriate clinical setting. Mild interstitial edema with trace effusions  · Received diuresis with IV Lasix   · CXR 9/5: Improved aeration of the bilateral lungs with mild bibasilar atelectasis. · Completed Zosyn x 7 days (8/25-8/31) - MRSA nares neg  · Tolerates pressure support during the day, but still requires full vent support HS secondary to tachypnea/secretions. Plan:  · Current vent settings: AC/VC 14/500/6/40%, PS 12/6 40% during the day  · Day 16 on Vent  · Continues to be weak.  Unclear if etiology is neurologic/neuromuscular vs psychological (?conversion disorder)  · NIF -8 to -14  · Trach/PEG planned for today  · Wean Fio2 for goal Sp02 >90  · Continue pulmonary hygiene   · VAP ppx; chlorhexidine, PPI, HOB greater than 30 degrees    Toxic metabolic encephalopathy  Assessment & Plan  Patient initially presented to [facility] on 8/23 with hypoxia and encephalopathy. Acute change in GCS prompted rapid response and subsequent intubation for airway protection. Of note, pt w/ recent hospitalization to [facility] 8/11-8/17 for encephalopathy with unclear etiology despite extensive workup but Wernicke's encephalopathy was on the differential.    ·  [place] workup 8/11:  · Rapid response and intubation on 8/12 for airway protection, extubated on 8/13  · CT head 8/11: No acute intracranial abnormality. Stable small bilateral subcortical hypoattenuating foci. No associated mass effect. · MRI 8/12: No acute intracranial abnormality. Stable nonspecific enhancement along the anterior floor of the third ventricle/hypothalamus compared to March 2023 study. Findings below:  · Differentials includes chronic toxic metabolic insult (thiamine deficiency), chronic inflammatory/infectious conditions  · Other considerations include Langerhans cell cell histiocytosis, lymphocytic hypophysitis, and neurosyphilis. · Primary CNS lymphoma less likely consideration, given the lack of interval change. Serologic correlation is recommended. Stable cerebral white matter signal abnormality, presumably the sequela of chronic microangiopathic disease given the patient's history  · VEEG 8/12: negative for seizure activity. Possible findings relating to underlying sleep disorder   · LP: Grossly negative overall  · Meningitis/encephalitis panel negative, bacterial/fungal culture and gram negative, lyme and cryptococcal negative  · Paraneoplastic panel neg  · Unable to obtain CSF VDRL  · DSPO: Treated with high dose thiamine. Psych evaluated, possible bipolar disorder.  Discharged to home with his sister with visiting nurses, speech therapy, PT/OT  · [facility] ([place]) 8/23: Presented with hypoxia found by visiting nurse SPO2 in 80's  · 8/24: Patient's girlfriend visited [facility] and found him unresponsive. Notes said that 'eyes were rolled back with secretions around his mouth"  · Rapid response called and intubated for hypoxia and low GCS. VBG Post intubation 7.25/76/59/33  · [facility] to [facility] on 8/24  · ASHLEY Nuno 8/24 (Current hospital course)  · CT head 8/24: No acute intracranial abnormality  · 8/25 EEG: No evidence of electrographic seizures. Mild background slowing suggestive of underlying cerebral dysfunction from toxic/metablic/infectious/hypoxic encephalopathy. Clinical correlation is recommended  · On neurological exam: wakes to voice, follows 2 step commands   · Continues to be weak off sedation    Plan:  · Mentation now improved but remains with significant generalized weakness   · Neurology consulted, recommendations as follows:  · Somnolence has been going on for a while, some neuro symptoms after chemo in march. · Ach binding and blocking Ab negative   · MUSK antibody and ACHR modulating Ab pending   · Serum anti-TPO and DARIN Ab's, RPR, neg. CSF paraneoplastic panel neg  · Varicella and toxoplasma antibodies pending   · Autoimmune labs pending   · VGCC serum test pending   · Completed 7 days of Ceftriaxone on 9/7 to cover for possible neurosyphillis  · Repeat LP 9/6: gram stain negative, HSV negative  · Pending: VDRL, VZV PCR, ACE, culture  · 9/7 started IVIg x 5 days and pulse dose steroids x 3 days per neuro recommendations for possible autoimmune component to neuro symptoms   · New MRI ordered  · Continue thiamine  · CAM ICU  · Sleep hygiene  · Neurochecks per routine     Acute pulmonary embolism without acute cor pulmonale ([address])  Assessment & Plan  Presented with hypoxia, initially admitted to the floor on supplemental NC  · CTA PE: Pulmonary embolism in the right lower lobe segmental pulmonary artery. Measured RV/LV ratio is within normal limits at less than 0.9.   · BNP 9, initial HS troponin 9  · Initiated on heparin gtt, transitioned to therapeutic Lovenox on 8/26  · Developed worsening hypoxia 8/24, rapid response was called and patient was intubated for airway protection and hypoxic  · Now requiring high vent setting  · Echo completed 8/25: Left Ventricle: Left ventricular cavity size is normal. Wall thickness is normal. The left ventricular ejection fraction is 60% by visual estimation. . Wall motion is normal. Diastolic function is normal for age. Right Ventricle: Right ventricular cavity size is normal. Systolic function is normal. Normal tricuspid annular plane systolic excursion (TAPSE) > 1.7 cm. Plan:  · 8/26: Heparin converted to therapeutic lovenox on 8/26  · 9/6: Therapeutic lovenox transitioned to Heparin gtt in lenora-procedural period   · Hold heparin this AM for trach/PEG  · Dopplers of LE neg for DVT bilaterally    History of LVH (left ventricular hypertrophy)  Assessment & Plan  Previous echocardiogram 6/2/23 showed EF 00%, normal diastolic dysfunction, mild left ventricular hypertrophy, mild RVSP elevation and mild TR  · PTA Med: Torsemide 20 mg daily  · Evaluated by Heart Failure team in July 2023. LVH thought to be secondary to hypertension, obesity, hx of meth use  · Recommended sleep study at that time   · Echo this admission shows normal EF of 60%, normal LV thickness, no diastolic dysfunction    Depression  Assessment & Plan  · Continue Fluoxetine 10 mg daily  · Evaluated by psychiatry last admission, felt to have depressive mood disorder  · Possibly contributing to weakness on vent despite being off sedation      Seminoma of left testis [facility])  Assessment & Plan  · Testicular cancer s/p left orchiectomy on 12/2022  · Repeat CT scan in January 2023 with enlarging lymph node.   · Began Chemo in March 2023: Etoposide and Cisplatin with plan for 4 cycles, 5 days every 21 days  · Did not complete chemo treatment due to adverse effects  · Stopped Cisplatin after 1 cycle due to painful neuropathy  · Continued Etoposide 2nd cycle  · Resumed Cisplatin and Etoposide 3rd cycle  · After 3rd cycle reported double vision, labile affect and right sided weakness and therefore chemotherapy was stopped  · Stopped chemo on 5/26/23  · MRI demonstrated white matter changes at that time and he was referred to Neurology   · Continue outpatient follow up with Heme/Onc     Hypertension  Assessment & Plan  Hx of hypertension. PTA was on Torsemide 20 mg daily  · Echo this admission showed EF 60%, normal LV thickness, no systolic or diastolic dysfunction  · Hypotension s/p intubation initially requiring Levophed DC'ed on 8/27  · Patient has been normotensive    Umbilical hernia without obstruction and without gangrene  Assessment & Plan  · Umbilical hernia present, easily reducible            ICU Core Measures     Vented Patient  VAP Bundle  VAP bundle ordered     A: Assess, Prevent, and Manage Pain · Has pain been assessed? Yes  · Need for changes to pain regimen? No   B: Both Spontaneous Awakening Trials (SATs) and Spontaneous Breathing Trials (SBTs) · Plan to perform spontaneous awakening trial today? Yes   · Plan to perform spontaneous breathing trial today? Yes   · Obvious barriers to extubation? Yes   C: Choice of Sedation · RASS Goal: N/A patient not on sedation  · Need for changes to sedation or analgesia regimen? NA   D: Delirium · CAM-ICU: Negative   E: Early Mobility  · Plan for early mobility? Yes   F: Family Engagement · Plan for family engagement today? Yes       Review of Invasive Devices:      Central access plan: unaccessed R chest wall port      Prophylaxis:  VTE VTE covered by:  heparin (porcine), Intravenous, 18 Units/kg/hr at 09/08/23 0015  heparin (porcine), Intravenous  heparin (porcine), Intravenous       Stress Ulcer  covered byomeprazole (PRILOSEC) suspension 2 mg/mL [[id number]]       Subjective   HPI/24hr events: Yesterday started on IVIG x 5 days and pulse dose steroids x 3 days per neuro recommendations for possible autoimmune component to neuro symptoms. Plan for trach/PEG today. Plan for repeat MRI brain. Review of Systems   Unable to perform ROS: Intubated            Objective                            Vitals I/O      Most Recent Min/Max in 24hrs   Temp 100 °F (37.8 °C) Temp  Min: 97.7 °F (36.5 °C)  Max: 100.8 °F (38.2 °C)   Pulse 80 Pulse  Min: 74  Max: 104   Resp 18 Resp  Min: 15  Max: 32   /71 BP  Min: 105/75  Max: 132/82   O2 Sat 97 % SpO2  Min: 95 %  Max: 100 %      Intake/Output Summary (Last 24 hours) at 9/8/2023 0302  Last data filed at 9/8/2023 0000  Gross per 24 hour   Intake 3680.5 ml   Output 550 ml   Net 3130.5 ml         Diet NPO     Invasive Monitoring Physical exam   N/A Physical Exam  Eyes:      General: Lids are normal.      Extraocular Movements: Extraocular movements intact. Conjunctiva/sclera: Conjunctivae normal.      Pupils: Pupils are equal, round, and reactive to light. Skin:     General: Skin is warm and dry. Capillary Refill: Capillary refill takes less than 2 seconds. HENT:      Head: Normocephalic and atraumatic. Neck:      Comments: Favors having neck turned toward left, can slowly move towards right side when asked but it takes a fair amount of effortCardiovascular:      Rate and Rhythm: Normal rate and regular rhythm. Pulses: Normal pulses. Radial pulses are 2+ on the right side and 2+ on the left side. Dorsalis pedis pulses are 2+ on the right side and 2+ on the left side. Heart sounds: Normal heart sounds, S1 normal and S2 normal.   Musculoskeletal:      Cervical back: Rigidity present. Decreased range of motion. Comments: Generalized weakness. Both UE and LE +3/5 strength   Abdominal:      General: Bowel sounds are normal.      Palpations: Abdomen is soft. Constitutional:       Appearance: He is ill-appearing. Interventions: He is intubated and restrained. Pulmonary:      Effort: Pulmonary effort is normal. He is intubated.       Breath sounds: Normal breath sounds. Comments: Large amount of thick clear oral and ETT secretions   Psychiatric:         Mood and Affect: Affect is flat. Neurological:      Mental Status: He is easily aroused. He is lethargic. GCS: GCS eye subscore is 4. GCS verbal subscore is 1. GCS motor subscore is 6. Diagnostic Studies      No new imaging      Medications:  Scheduled PRN   acetaminophen, 650 mg, Daily  albuterol, 2.5 mg, Q6H  chlorhexidine, 15 mL, Q12H ISAEL  diphenhydrAMINE, 50 mg, Daily  FLUoxetine, 10 mg, Daily  immune globulin, human, 42.5 g, Q24H  methylPREDNISolone sodium succinate, 1,000 mg, Daily  metoclopramide, 10 mg, Daily  omeprazole (PRILOSEC) suspension 2 mg/mL, 20 mg, Daily  sodium chloride, 4 mL, Q6H  thiamine, 100 mg, Daily      acetaminophen, 650 mg, Q4H PRN  fentanyl citrate (PF), 50 mcg, Q1H PRN  heparin (porcine), 10,000 Units, Q6H PRN  heparin (porcine), 5,000 Units, Q6H PRN  polyethylene glycol, 17 g, Daily PRN  senna, 17.6 mg, Daily PRN       Continuous    heparin (porcine), 3-30 Units/kg/hr (Order-Specific), Last Rate: 18 Units/kg/hr (09/08/23 0015)         Labs:    CBC    Recent Labs     09/06/23  0506   WBC 8.66   HGB 11.6*   HCT 37.2        BMP    Recent Labs     09/06/23  0506   SODIUM 146   K 4.0   *   CO2 29   AGAP 7   BUN 15   CREATININE 0.79   CALCIUM 9.3       Coags    Recent Labs     09/06/23  0506 09/06/23  1832 09/07/23  0108 09/07/23  0705   INR 1.07  --   --   --    PTT  --    < > 73* 83*    < > = values in this interval not displayed.         Additional Electrolytes  Recent Labs     09/06/23  0506   MG 2.4   PHOS 3.8          Blood Gas    No recent results  No recent results LFTs  No recent results    Infectious  No recent results  Glucose  Recent Labs     09/06/23  0506   GLUC 92               Mary Seat, CRNP

## 2023-09-07 NOTE — ASSESSMENT & PLAN NOTE
Previous echocardiogram 6/2/23 showed EF 89%, normal diastolic dysfunction, mild left ventricular hypertrophy, mild RVSP elevation and mild TR  · PTA Med: Torsemide 20 mg daily  · Evaluated by Heart Failure team in July 2023.  LVH thought to be secondary to hypertension, obesity, hx of meth use  · Recommended sleep study at that time   · Echo this admission shows normal EF of 60%, normal LV thickness, no diastolic dysfunction

## 2023-09-07 NOTE — ASSESSMENT & PLAN NOTE
Patient initially presented to [facility] on 8/23 with hypoxia and encephalopathy. Acute change in GCS prompted rapid response and subsequent intubation for airway protection. Of note, pt w/ recent hospitalization to [facility] 8/11-8/17 for encephalopathy with unclear etiology despite extensive workup but Wernicke's encephalopathy was on the differential.    ·  [place] workup 8/11:  · Rapid response and intubation on 8/12 for airway protection, extubated on 8/13  · CT head 8/11: No acute intracranial abnormality. Stable small bilateral subcortical hypoattenuating foci. No associated mass effect. · MRI 8/12: No acute intracranial abnormality. Stable nonspecific enhancement along the anterior floor of the third ventricle/hypothalamus compared to March 2023 study. Findings below:  · Differentials includes chronic toxic metabolic insult (thiamine deficiency), chronic inflammatory/infectious conditions  · Other considerations include Langerhans cell cell histiocytosis, lymphocytic hypophysitis, and neurosyphilis. · Primary CNS lymphoma less likely consideration, given the lack of interval change. Serologic correlation is recommended. Stable cerebral white matter signal abnormality, presumably the sequela of chronic microangiopathic disease given the patient's history  · VEEG 8/12: negative for seizure activity. Possible findings relating to underlying sleep disorder   · LP: Grossly negative overall  · Meningitis/encephalitis panel negative, bacterial/fungal culture and gram negative, lyme and cryptococcal negative  · Paraneoplastic panel neg  · Unable to obtain CSF VDRL  · DSPO: Treated with high dose thiamine. Psych evaluated, possible bipolar disorder. Discharged to home with his sister with visiting nurses, speech therapy, PT/OT  · [facility] ([place]) 8/23: Presented with hypoxia found by visiting nurse SPO2 in 80's  · 8/24: Patient's girlfriend visited [facility] and found him unresponsive.  Notes said that 'eyes were rolled back with secretions around his mouth"  · Rapid response called and intubated for hypoxia and low GCS. VBG Post intubation 7.25/76/59/33  · Transferred to [facility] on 8/24  · ASHLEY [facility] 8/24 (Current hospital course)  · CT head 8/24: No acute intracranial abnormality  · 8/25 EEG: No evidence of electrographic seizures. Mild background slowing suggestive of underlying cerebral dysfunction from toxic/metablic/infectious/hypoxic encephalopathy. Clinical correlation is recommended  · On neurological exam: wakes to voice, follows 2 step commands   · Continues to be weak off sedation    Plan:  · Mentation now improved but remains with significant generalized weakness   · Neurology consulted, recommendations as follows:  · Somnolence has been going on for a while, some neuro symptoms after chemo in march. · Ach binding and blocking Ab negative   · MUSK antibody and ACHR modulating Ab pending   · Serum anti-TPO and DARIN Ab's, RPR, neg.  CSF paraneoplastic panel neg  · Antibiotics: Ceftriaxone started to cover for possible neurosyphillis (7 day treatment)   · Ceftriaxone Day 7/7 (9/1-9/7)  · Repeat LP 9/6: gram stain negative, HSV negative  · Pending: VDRL, VZV PCR, ACE, culture  · Continue thiamine  · CAM ICU  · Sleep hygiene  · Neurochecks per routine

## 2023-09-07 NOTE — RESPIRATORY THERAPY NOTE
RT Ventilator Management Note  Hannah Hendrix 41 y.o. male MRN: [id number]  Unit/Bed#: ICU 03 Encounter: [id number]      Daily Screen       9/5/2023 0715 9/6/2023 0715          Patient safety screen outcome[de-identified] Passed Failed      Not Ready for Weaning due to[de-identified] Underline problem not resolved Underline problem not resolved      Spont breathing trial outcome[de-identified] Passed Failed      Spont breathing trial reason failed: -- Hemodynamic unstable      Previous settings resumed: -- Yes      RSBI: 48 --              Physical Exam:   Assessment Type: Pre-treatment  General Appearance: Awake, Alert  Respiratory Pattern: Assisted  Chest Assessment: Chest expansion symmetrical  Bilateral Breath Sounds: Diminished, Coarse  Cough: None  Suction: Oral, ET Tube  O2 Device: ventilator  Subjective Data: wrist restraints secured      Resp Comments: wrist restraints are secure

## 2023-09-07 NOTE — ASSESSMENT & PLAN NOTE
· Continue Fluoxetine 10 mg daily  · Evaluated by psychiatry last admission, felt to have depressive mood disorder  · Possibly contributing to weakness on vent despite being off sedation

## 2023-09-08 PROBLEM — G93.40 ENCEPHALOPATHY: Status: ACTIVE | Noted: 2023-04-20

## 2023-09-08 LAB
ACHR MOD AB/ACHR TOTAL SFR SER: 0 % (ref 0–45)
ANA SER QL IA: NEGATIVE
APTT PPP: 116 SECONDS (ref 23–37)
CLINICAL COMMENT: NORMAL
GLUCOSE SERPL-MCNC: 136 MG/DL (ref 65–140)
GLUCOSE SERPL-MCNC: 162 MG/DL (ref 65–140)
MUSK AB SER IA-ACNC: <1 U/ML
RHEUMATOID FACT SER QL LA: NEGATIVE
SCAN RESULT: NORMAL
T GONDII IGG SERPL IA-ACNC: <3 IU/ML (ref 0–7.1)
T GONDII IGM SER IA-ACNC: <3 AU/ML (ref 0–7.9)

## 2023-09-08 PROCEDURE — 99233 SBSQ HOSP IP/OBS HIGH 50: CPT | Performed by: ANESTHESIOLOGY

## 2023-09-08 PROCEDURE — 97110 THERAPEUTIC EXERCISES: CPT

## 2023-09-08 PROCEDURE — 94640 AIRWAY INHALATION TREATMENT: CPT

## 2023-09-08 PROCEDURE — 31600 PLANNED TRACHEOSTOMY: CPT | Performed by: SURGERY

## 2023-09-08 PROCEDURE — 0B110F4 BYPASS TRACHEA TO CUTANEOUS WITH TRACHEOSTOMY DEVICE, OPEN APPROACH: ICD-10-PCS | Performed by: SURGERY

## 2023-09-08 PROCEDURE — NC001 PR NO CHARGE: Performed by: PHYSICIAN ASSISTANT

## 2023-09-08 PROCEDURE — 82948 REAGENT STRIP/BLOOD GLUCOSE: CPT

## 2023-09-08 PROCEDURE — 94003 VENT MGMT INPAT SUBQ DAY: CPT

## 2023-09-08 PROCEDURE — 5A1955Z RESPIRATORY VENTILATION, GREATER THAN 96 CONSECUTIVE HOURS: ICD-10-PCS | Performed by: SURGERY

## 2023-09-08 PROCEDURE — 86596 VOLTAGE-GTD CA CHNL ANTB EA: CPT | Performed by: PSYCHIATRY & NEUROLOGY

## 2023-09-08 PROCEDURE — 85730 THROMBOPLASTIN TIME PARTIAL: CPT | Performed by: ANESTHESIOLOGY

## 2023-09-08 PROCEDURE — 0DH63UZ INSERTION OF FEEDING DEVICE INTO STOMACH, PERCUTANEOUS APPROACH: ICD-10-PCS | Performed by: SURGERY

## 2023-09-08 PROCEDURE — 31600 PLANNED TRACHEOSTOMY: CPT | Performed by: PHYSICIAN ASSISTANT

## 2023-09-08 PROCEDURE — 43246 EGD PLACE GASTROSTOMY TUBE: CPT | Performed by: SURGERY

## 2023-09-08 PROCEDURE — 94760 N-INVAS EAR/PLS OXIMETRY 1: CPT

## 2023-09-08 PROCEDURE — A7521 TRACH/LARYN TUBE CUFFED: HCPCS | Performed by: SURGERY

## 2023-09-08 PROCEDURE — 43246 EGD PLACE GASTROSTOMY TUBE: CPT | Performed by: PHYSICIAN ASSISTANT

## 2023-09-08 DEVICE — PERCUTANEOUS ENDOSCOPIC GASTROSTOMY KIT ENFIT
Type: IMPLANTABLE DEVICE | Site: ABDOMEN | Status: FUNCTIONAL
Brand: ENDOVIVE SAFETY PEG KIT

## 2023-09-08 RX ORDER — INSULIN LISPRO 100 [IU]/ML
2-12 INJECTION, SOLUTION INTRAVENOUS; SUBCUTANEOUS EVERY 6 HOURS SCHEDULED
Status: DISCONTINUED | OUTPATIENT
Start: 2023-09-08 | End: 2023-09-12

## 2023-09-08 RX ORDER — PHENYLEPHRINE HCL IN 0.9% NACL 1 MG/10 ML
SYRINGE (ML) INTRAVENOUS AS NEEDED
Status: DISCONTINUED | OUTPATIENT
Start: 2023-09-08 | End: 2023-09-08

## 2023-09-08 RX ORDER — MIDAZOLAM HYDROCHLORIDE 2 MG/2ML
INJECTION, SOLUTION INTRAMUSCULAR; INTRAVENOUS AS NEEDED
Status: DISCONTINUED | OUTPATIENT
Start: 2023-09-08 | End: 2023-09-08

## 2023-09-08 RX ORDER — ROCURONIUM BROMIDE 10 MG/ML
INJECTION, SOLUTION INTRAVENOUS AS NEEDED
Status: DISCONTINUED | OUTPATIENT
Start: 2023-09-08 | End: 2023-09-08

## 2023-09-08 RX ORDER — ACETAMINOPHEN 650 MG/1
650 SUPPOSITORY RECTAL ONCE
Status: COMPLETED | OUTPATIENT
Start: 2023-09-08 | End: 2023-09-08

## 2023-09-08 RX ORDER — MAGNESIUM HYDROXIDE 1200 MG/15ML
LIQUID ORAL AS NEEDED
Status: DISCONTINUED | OUTPATIENT
Start: 2023-09-08 | End: 2023-09-08 | Stop reason: HOSPADM

## 2023-09-08 RX ORDER — SODIUM CHLORIDE, SODIUM LACTATE, POTASSIUM CHLORIDE, CALCIUM CHLORIDE 600; 310; 30; 20 MG/100ML; MG/100ML; MG/100ML; MG/100ML
INJECTION, SOLUTION INTRAVENOUS CONTINUOUS PRN
Status: DISCONTINUED | OUTPATIENT
Start: 2023-09-08 | End: 2023-09-08

## 2023-09-08 RX ADMIN — FENTANYL CITRATE 50 MCG: 50 INJECTION, SOLUTION INTRAMUSCULAR; INTRAVENOUS at 00:35

## 2023-09-08 RX ADMIN — ALBUTEROL SULFATE 2.5 MG: 2.5 SOLUTION RESPIRATORY (INHALATION) at 13:28

## 2023-09-08 RX ADMIN — METOCLOPRAMIDE 10 MG: 5 INJECTION, SOLUTION INTRAMUSCULAR; INTRAVENOUS at 16:26

## 2023-09-08 RX ADMIN — Medication 100 MCG: at 14:34

## 2023-09-08 RX ADMIN — ALBUTEROL SULFATE 2.5 MG: 2.5 SOLUTION RESPIRATORY (INHALATION) at 03:06

## 2023-09-08 RX ADMIN — FLUOXETINE 10 MG: 10 CAPSULE ORAL at 08:07

## 2023-09-08 RX ADMIN — CHLORHEXIDINE GLUCONATE 15 ML: 1.2 RINSE ORAL at 20:12

## 2023-09-08 RX ADMIN — SODIUM CHLORIDE, SODIUM LACTATE, POTASSIUM CHLORIDE, AND CALCIUM CHLORIDE: .6; .31; .03; .02 INJECTION, SOLUTION INTRAVENOUS at 14:10

## 2023-09-08 RX ADMIN — ROCURONIUM BROMIDE 20 MG: 10 INJECTION, SOLUTION INTRAVENOUS at 14:43

## 2023-09-08 RX ADMIN — ALBUTEROL SULFATE 2.5 MG: 2.5 SOLUTION RESPIRATORY (INHALATION) at 19:16

## 2023-09-08 RX ADMIN — ALBUTEROL SULFATE 2.5 MG: 2.5 SOLUTION RESPIRATORY (INHALATION) at 07:38

## 2023-09-08 RX ADMIN — ACETAMINOPHEN 650 MG: 650 SUPPOSITORY RECTAL at 16:26

## 2023-09-08 RX ADMIN — SODIUM CHLORIDE SOLN NEBU 3% 4 ML: 3 NEBU SOLN at 19:16

## 2023-09-08 RX ADMIN — ROCURONIUM BROMIDE 50 MG: 10 INJECTION, SOLUTION INTRAVENOUS at 14:20

## 2023-09-08 RX ADMIN — SODIUM CHLORIDE SOLN NEBU 3% 4 ML: 3 NEBU SOLN at 07:38

## 2023-09-08 RX ADMIN — SODIUM CHLORIDE SOLN NEBU 3% 4 ML: 3 NEBU SOLN at 13:28

## 2023-09-08 RX ADMIN — HEPARIN SODIUM 18 UNITS/KG/HR: 10000 INJECTION, SOLUTION INTRAVENOUS at 00:15

## 2023-09-08 RX ADMIN — CHLORHEXIDINE GLUCONATE 15 ML: 1.2 RINSE ORAL at 08:02

## 2023-09-08 RX ADMIN — SODIUM CHLORIDE 1000 MG: 0.9 INJECTION, SOLUTION INTRAVENOUS at 09:10

## 2023-09-08 RX ADMIN — DIPHENHYDRAMINE HYDROCHLORIDE 50 MG: 50 INJECTION, SOLUTION INTRAMUSCULAR; INTRAVENOUS at 16:26

## 2023-09-08 RX ADMIN — INSULIN LISPRO 2 UNITS: 100 INJECTION, SOLUTION INTRAVENOUS; SUBCUTANEOUS at 23:45

## 2023-09-08 RX ADMIN — Medication 20 MG: at 08:07

## 2023-09-08 RX ADMIN — MIDAZOLAM 2 MG: 1 INJECTION INTRAMUSCULAR; INTRAVENOUS at 15:26

## 2023-09-08 RX ADMIN — SODIUM CHLORIDE SOLN NEBU 3% 4 ML: 3 NEBU SOLN at 03:06

## 2023-09-08 RX ADMIN — THIAMINE HCL TAB 100 MG 100 MG: 100 TAB at 08:07

## 2023-09-08 RX ADMIN — Medication 42.5 G: at 17:08

## 2023-09-08 NOTE — PHYSICAL THERAPY NOTE
PT TREATMENT     09/08/23 1500   PT Last Visit   PT Visit Date 09/08/23   Pain Assessment   Pain Assessment Tool Choi-Baker FACES   Choi-Baker FACES Pain Rating 0   Restrictions/Precautions   Weight Bearing Precautions Per Order No   Other Precautions Bed Alarm; Restraints;Multiple lines;Telemetry  (intubated)   General   Family/Caregiver Present   (yes: sister entered at end of session)   Cognition   Overall Cognitive Status Unable to assess   Attention Within functional limits   Orientation Level Oriented to person   Following Commands Follows multistep commands with increased time or repetition   Comments Able to follow all commands   Subjective   Subjective non verbal due to intubation   Transfers   Sit to Stand Unable to assess   Ambulation/Elevation   Gait Assistance Not tested   Activity Tolerance   Activity Tolerance Patient tolerated treatment well   Nurse Made Aware yes: Sabina Gomes   Assessment   Prognosis Good   Problem List Decreased strength;Decreased endurance; Impaired balance;Decreased mobility;Obesity; Decreased skin integrity   Assessment Pt is alert and following all commands. Pt required some AAROM due to weakness but is motivated and does all exercises. Instructed with Active exercises to to as tolerated during the day: APs, quad sets, glut sets, hip IR via rolling straight leg, wrist extension and finger flex/ext. Pt able to return demonstrate all exercises. Pt will continue to benefit from PT to improve strength during hospitalization. The patient's AM-PAC Basic Mobility Inpatient Short Form Raw Score is 7. A Raw score of less than or equal to 16 suggests the patient may benefit from discharge to post-acute rehabilitation services. Please also refer to the recommendation of the Physical Therapist for safe discharge planning. Goals   Patient Goals unable to state   Plan   Treatment/Interventions LE strengthening/ROM; Therapeutic exercise; Endurance training;Bed mobility;Spoke to nursing;Spoke to MD;OT   PT Frequency Other (Comment)  (5w)   Recommendation   PT Discharge Recommendation Post acute rehabilitation services   AM-PAC Basic Mobility Inpatient   Turning in Flat Bed Without Bedrails 2   Lying on Back to Sitting on Edge of Flat Bed Without Bedrails 1   Moving Bed to Chair 1   Standing Up From Chair Using Arms 1   Walk in Room 1   Climb 3-5 Stairs With Railing 1   Basic Mobility Inpatient Raw Score 7   Turning Head Towards Sound 3   Follow Simple Instructions 3   Low Function Basic Mobility Raw Score  13   Low Function Basic Mobility Standardized Score  20.14   Highest Level Of Mobility   JH-HLM Goal 2: Bed activities/Dependent transfer   JH-HLM Achieved 2: Bed activities/Dependent transfer   Exercises   Quad Sets Supine;10 reps;Bilateral   Heelslides Supine;10 reps;Bilateral;AAROM   Glute Sets Supine;10 reps   Hip Abduction Supine;10 reps;AAROM; Bilateral   Hip Extension Supine;10 reps;AAROM; Bilateral  (hip IR with knee in extension via rolling leg medially)   Knee AROM Short Arc Quad Supine;10 reps;Bilateral   Ankle Pumps Supine;10 reps;Bilateral   Heel Cord Stretch Supine;Bilateral   UE Exercise Supine;Bilateral;10 reps  (shoulder flexion, elbow flex/ext, shoulder ER, wrist extension, hand / digit flex/ext.   .)   End of Consult   Patient Position at End of Consult Supine;Bed/Chair alarm activated   Licensure   [place] License Number  Yossi Navarro Sabrina PT  [id number]

## 2023-09-08 NOTE — OP NOTE
OPERATIVE REPORT  PATIENT NAME: Yael Thornton    :  1985  MRN: [id number]  Pt Location: [place] OR ROOM 01    SURGERY DATE: 2023    Surgeon(s) and Role:     * Genell Apgar, MD - Primary     * Gary Shelton PA-C - Assisting    Preop Diagnosis:  History of Wernicke's encephalopathy [Z86.39]  Acute respiratory failure with hypoxia ([address]) [C19.77]  Toxic metabolic encephalopathy [X60.1]    Post-Op Diagnosis Codes:     * History of Wernicke's encephalopathy [Z86.39]     * Acute respiratory failure with hypoxia ([address]) [J96.01]     * Toxic metabolic encephalopathy [V95.7]    Procedure(s):  TRACHEOSTOMY WITH INSERTION PEG TUBE    Specimen(s):  * No specimens in log *    Estimated Blood Loss:   Minimal    Drains:  Gastrostomy/Enterostomy PEG- Jejunostomy 20 Fr. LUQ (Active)   Number of days: 0       Anesthesia Type:   Choice    Operative Indications:  History of Wernicke's encephalopathy [Z86.39]  Acute respiratory failure with hypoxia ([address]) [P00.78]  Toxic metabolic encephalopathy [J08.9]      Operative Findings:  Normal trachea and stomach    Complications:   None    Procedure and Technique:  1. Tracheostomy  2. PEG tube placement    Patient was taken back to main operating, placed supine on the OR table, general anesthesia was induced, patient was placed in the beachchair position and the neck was prepped and draped in normal fashion. Utilizing an incision 2 cm above the sternal locks, skin was incised. Soft tissue was divided with Bovie cautery. The strap muscles were divided in the midline and the thyroid isthmus was divided with Bovie cautery and ligated with 2-0 Prolene. The second tracheal ring was identified and 2-0 Prolene sutures were placed laterally around the second tracheal ring. A tracheotomy was performed and the ET tube was withdrawn under direct observation. 8.  Cuffed Shiley tracheostomy tube was placed without complication.   The skin around the tracheostomy tube was closed with 3-0 Monocryl. 3-0 nylon sutures were placed in the flange in 2 locations. At this point of the case tracheostomy portion was finished and the PEG tube ports began. The gastric tube was placed down into the stomach and the stomach was distended. A one-to-one palpation was obtained and slight transillumination was noted. We felt that his body habitus prevented further transillumination. Since we had a one-to-one palpation ratio, the needle was placed percutaneously and the wire was withdrawn with the scope utilizing the snare. 18 Georgian PEG tube was placed via the "pull" technique. The flange was placed at 6 cm at the skin. The stomach was inspected and there was no bleeding. The gastroscope was withdrawn. The patient was then transferred back to ICU in stable condition. RAFAEL Tristan was required for technical assistance and retraction. I was present for the entire procedure., A qualified resident physician was not available. and A physician assistant was required during the procedure for retraction, tissue handling, dissection and suturing.     Patient Disposition:  PACU         SIGNATURE: Staci Mejia MD  DATE: September 8, 2023  TIME: 3:32 PM

## 2023-09-08 NOTE — QUICK NOTE
Stable and intubated. Planning for Tracheostomy and PEG tube for this patient. Family and Patient aware.

## 2023-09-08 NOTE — ANESTHESIA POSTPROCEDURE EVALUATION
Post-Op Assessment Note    CV Status:  Stable  Pain Score: 0    Pain management: adequate     Mental Status:  Somnolent (Sedated for pt. comfort post SX)   Hydration Status:  Euvolemic   PONV Controlled:  Controlled   Airway Patency:  Patent      Post Op Vitals Reviewed: Yes            No notable events documented.     BP   121/56   Temp   98   Pulse  78   Resp   12   SpO2   100

## 2023-09-09 ENCOUNTER — APPOINTMENT (INPATIENT)
Dept: RADIOLOGY | Facility: HOSPITAL | Age: 38
DRG: 005 | End: 2023-09-09
Payer: COMMERCIAL

## 2023-09-09 LAB
ANION GAP SERPL CALCULATED.3IONS-SCNC: 8 MMOL/L
APTT PPP: 118 SECONDS (ref 23–37)
APTT PPP: 23 SECONDS (ref 23–37)
APTT PPP: 60 SECONDS (ref 23–37)
BACTERIA CSF CULT: NO GROWTH
BASOPHILS # BLD AUTO: 0.01 THOUSANDS/ÂΜL (ref 0–0.1)
BASOPHILS NFR BLD AUTO: 0 % (ref 0–1)
BUN SERPL-MCNC: 16 MG/DL (ref 5–25)
CA-I BLD-SCNC: 1.12 MMOL/L (ref 1.12–1.32)
CALCIUM SERPL-MCNC: 9.2 MG/DL (ref 8.4–10.2)
CHLORIDE SERPL-SCNC: 113 MMOL/L (ref 96–108)
CO2 SERPL-SCNC: 27 MMOL/L (ref 21–32)
CREAT SERPL-MCNC: 0.68 MG/DL (ref 0.6–1.3)
ENA JO1 AB SER-ACNC: <0.2 AI (ref 0–0.9)
EOSINOPHIL # BLD AUTO: 0 THOUSAND/ÂΜL (ref 0–0.61)
EOSINOPHIL NFR BLD AUTO: 0 % (ref 0–6)
ERYTHROCYTE [DISTWIDTH] IN BLOOD BY AUTOMATED COUNT: 14.7 % (ref 11.6–15.1)
GFR SERPL CREATININE-BSD FRML MDRD: 122 ML/MIN/1.73SQ M
GLUCOSE SERPL-MCNC: 126 MG/DL (ref 65–140)
GLUCOSE SERPL-MCNC: 128 MG/DL (ref 65–140)
GLUCOSE SERPL-MCNC: 132 MG/DL (ref 65–140)
GLUCOSE SERPL-MCNC: 141 MG/DL (ref 65–140)
GLUCOSE SERPL-MCNC: 153 MG/DL (ref 65–140)
HCT VFR BLD AUTO: 34.2 % (ref 36.5–49.3)
HGB BLD-MCNC: 10.8 G/DL (ref 12–17)
IMM GRANULOCYTES # BLD AUTO: 0.02 THOUSAND/UL (ref 0–0.2)
IMM GRANULOCYTES NFR BLD AUTO: 0 % (ref 0–2)
LYMPHOCYTES # BLD AUTO: 0.43 THOUSANDS/ÂΜL (ref 0.6–4.47)
LYMPHOCYTES NFR BLD AUTO: 8 % (ref 14–44)
MAGNESIUM SERPL-MCNC: 2.4 MG/DL (ref 1.9–2.7)
MCH RBC QN AUTO: 31.4 PG (ref 26.8–34.3)
MCHC RBC AUTO-ENTMCNC: 31.6 G/DL (ref 31.4–37.4)
MCV RBC AUTO: 99 FL (ref 82–98)
MONOCYTES # BLD AUTO: 0.32 THOUSAND/ÂΜL (ref 0.17–1.22)
MONOCYTES NFR BLD AUTO: 6 % (ref 4–12)
NEUTROPHILS # BLD AUTO: 4.79 THOUSANDS/ÂΜL (ref 1.85–7.62)
NEUTS SEG NFR BLD AUTO: 86 % (ref 43–75)
NRBC BLD AUTO-RTO: 0 /100 WBCS
PHOSPHATE SERPL-MCNC: 3.5 MG/DL (ref 2.7–4.5)
PLATELET # BLD AUTO: 234 THOUSANDS/UL (ref 149–390)
PMV BLD AUTO: 10.3 FL (ref 8.9–12.7)
POTASSIUM SERPL-SCNC: 3.6 MMOL/L (ref 3.5–5.3)
RBC # BLD AUTO: 3.44 MILLION/UL (ref 3.88–5.62)
SODIUM SERPL-SCNC: 148 MMOL/L (ref 135–147)
VZV IGM SER IA-ACNC: <0.91 INDEX (ref 0–0.9)
WBC # BLD AUTO: 5.57 THOUSAND/UL (ref 4.31–10.16)

## 2023-09-09 PROCEDURE — 94668 MNPJ CHEST WALL SBSQ: CPT

## 2023-09-09 PROCEDURE — 84100 ASSAY OF PHOSPHORUS: CPT

## 2023-09-09 PROCEDURE — 85025 COMPLETE CBC W/AUTO DIFF WBC: CPT

## 2023-09-09 PROCEDURE — 70543 MRI ORBT/FAC/NCK W/O &W/DYE: CPT

## 2023-09-09 PROCEDURE — 85730 THROMBOPLASTIN TIME PARTIAL: CPT | Performed by: ANESTHESIOLOGY

## 2023-09-09 PROCEDURE — 94669 MECHANICAL CHEST WALL OSCILL: CPT

## 2023-09-09 PROCEDURE — 94760 N-INVAS EAR/PLS OXIMETRY 1: CPT

## 2023-09-09 PROCEDURE — A9585 GADOBUTROL INJECTION: HCPCS | Performed by: NURSE PRACTITIONER

## 2023-09-09 PROCEDURE — 94150 VITAL CAPACITY TEST: CPT

## 2023-09-09 PROCEDURE — 70553 MRI BRAIN STEM W/O & W/DYE: CPT

## 2023-09-09 PROCEDURE — 80048 BASIC METABOLIC PNL TOTAL CA: CPT

## 2023-09-09 PROCEDURE — 85730 THROMBOPLASTIN TIME PARTIAL: CPT | Performed by: NURSE PRACTITIONER

## 2023-09-09 PROCEDURE — 83735 ASSAY OF MAGNESIUM: CPT

## 2023-09-09 PROCEDURE — 82330 ASSAY OF CALCIUM: CPT

## 2023-09-09 PROCEDURE — 94640 AIRWAY INHALATION TREATMENT: CPT

## 2023-09-09 PROCEDURE — 94003 VENT MGMT INPAT SUBQ DAY: CPT

## 2023-09-09 PROCEDURE — 82948 REAGENT STRIP/BLOOD GLUCOSE: CPT

## 2023-09-09 PROCEDURE — 99232 SBSQ HOSP IP/OBS MODERATE 35: CPT | Performed by: ANESTHESIOLOGY

## 2023-09-09 RX ORDER — GADOBUTROL 604.72 MG/ML
13 INJECTION INTRAVENOUS
Status: COMPLETED | OUTPATIENT
Start: 2023-09-09 | End: 2023-09-09

## 2023-09-09 RX ORDER — HEPARIN SODIUM 10000 [USP'U]/100ML
3-30 INJECTION, SOLUTION INTRAVENOUS
Status: DISPENSED | OUTPATIENT
Start: 2023-09-09 | End: 2023-09-10

## 2023-09-09 RX ORDER — POTASSIUM CHLORIDE 14.9 MG/ML
20 INJECTION INTRAVENOUS ONCE
Status: COMPLETED | OUTPATIENT
Start: 2023-09-09 | End: 2023-09-09

## 2023-09-09 RX ORDER — HEPARIN SODIUM 1000 [USP'U]/ML
5000 INJECTION, SOLUTION INTRAVENOUS; SUBCUTANEOUS EVERY 6 HOURS PRN
Status: DISCONTINUED | OUTPATIENT
Start: 2023-09-09 | End: 2023-09-12

## 2023-09-09 RX ORDER — ACETAMINOPHEN 325 MG/1
650 TABLET ORAL DAILY
Status: DISCONTINUED | OUTPATIENT
Start: 2023-09-09 | End: 2023-09-09

## 2023-09-09 RX ORDER — ACETAMINOPHEN 160 MG/5ML
650 SUSPENSION ORAL DAILY
Status: DISCONTINUED | OUTPATIENT
Start: 2023-09-09 | End: 2023-09-09

## 2023-09-09 RX ORDER — HEPARIN SODIUM 1000 [USP'U]/ML
10000 INJECTION, SOLUTION INTRAVENOUS; SUBCUTANEOUS EVERY 6 HOURS PRN
Status: DISCONTINUED | OUTPATIENT
Start: 2023-09-09 | End: 2023-09-12

## 2023-09-09 RX ORDER — ACETAMINOPHEN 160 MG/5ML
650 SUSPENSION ORAL DAILY
Status: DISCONTINUED | OUTPATIENT
Start: 2023-09-09 | End: 2023-09-10

## 2023-09-09 RX ORDER — DEXTROSE MONOHYDRATE 50 MG/ML
75 INJECTION, SOLUTION INTRAVENOUS CONTINUOUS
Status: DISCONTINUED | OUTPATIENT
Start: 2023-09-09 | End: 2023-09-09

## 2023-09-09 RX ORDER — DIPHENHYDRAMINE HYDROCHLORIDE 50 MG/ML
50 INJECTION INTRAMUSCULAR; INTRAVENOUS DAILY
Status: DISCONTINUED | OUTPATIENT
Start: 2023-09-09 | End: 2023-09-10

## 2023-09-09 RX ADMIN — CHLORHEXIDINE GLUCONATE 15 ML: 1.2 RINSE ORAL at 08:35

## 2023-09-09 RX ADMIN — SODIUM CHLORIDE SOLN NEBU 3% 4 ML: 3 NEBU SOLN at 19:57

## 2023-09-09 RX ADMIN — HEPARIN SODIUM 18 UNITS/KG/HR: 10000 INJECTION, SOLUTION INTRAVENOUS at 08:55

## 2023-09-09 RX ADMIN — SODIUM CHLORIDE SOLN NEBU 3% 4 ML: 3 NEBU SOLN at 07:40

## 2023-09-09 RX ADMIN — ALBUTEROL SULFATE 2.5 MG: 2.5 SOLUTION RESPIRATORY (INHALATION) at 03:10

## 2023-09-09 RX ADMIN — ALBUTEROL SULFATE 2.5 MG: 2.5 SOLUTION RESPIRATORY (INHALATION) at 15:09

## 2023-09-09 RX ADMIN — ALBUTEROL SULFATE 2.5 MG: 2.5 SOLUTION RESPIRATORY (INHALATION) at 19:57

## 2023-09-09 RX ADMIN — GADOBUTROL 13 ML: 604.72 INJECTION INTRAVENOUS at 13:56

## 2023-09-09 RX ADMIN — FENTANYL CITRATE 50 MCG: 50 INJECTION, SOLUTION INTRAMUSCULAR; INTRAVENOUS at 13:13

## 2023-09-09 RX ADMIN — SODIUM CHLORIDE SOLN NEBU 3% 4 ML: 3 NEBU SOLN at 15:09

## 2023-09-09 RX ADMIN — SODIUM CHLORIDE 1000 MG: 0.9 INJECTION, SOLUTION INTRAVENOUS at 11:51

## 2023-09-09 RX ADMIN — SODIUM CHLORIDE SOLN NEBU 3% 4 ML: 3 NEBU SOLN at 03:10

## 2023-09-09 RX ADMIN — ACETAMINOPHEN 650 MG: 650 SUSPENSION ORAL at 16:11

## 2023-09-09 RX ADMIN — INSULIN LISPRO 2 UNITS: 100 INJECTION, SOLUTION INTRAVENOUS; SUBCUTANEOUS at 18:09

## 2023-09-09 RX ADMIN — FENTANYL CITRATE 50 MCG: 50 INJECTION, SOLUTION INTRAMUSCULAR; INTRAVENOUS at 04:30

## 2023-09-09 RX ADMIN — DIPHENHYDRAMINE HYDROCHLORIDE 50 MG: 50 INJECTION, SOLUTION INTRAMUSCULAR; INTRAVENOUS at 16:12

## 2023-09-09 RX ADMIN — ALBUTEROL SULFATE 2.5 MG: 2.5 SOLUTION RESPIRATORY (INHALATION) at 07:40

## 2023-09-09 RX ADMIN — CHLORHEXIDINE GLUCONATE 15 ML: 1.2 RINSE ORAL at 21:00

## 2023-09-09 RX ADMIN — HEPARIN SODIUM 18 UNITS/KG/HR: 10000 INJECTION, SOLUTION INTRAVENOUS at 19:19

## 2023-09-09 RX ADMIN — POTASSIUM CHLORIDE 20 MEQ: 14.9 INJECTION, SOLUTION INTRAVENOUS at 08:32

## 2023-09-09 RX ADMIN — DEXTROSE 75 ML/HR: 5 SOLUTION INTRAVENOUS at 08:35

## 2023-09-09 RX ADMIN — METOCLOPRAMIDE 10 MG: 5 INJECTION, SOLUTION INTRAMUSCULAR; INTRAVENOUS at 16:12

## 2023-09-09 RX ADMIN — Medication 42.5 G: at 16:50

## 2023-09-09 NOTE — CASE MANAGEMENT
Case Management Progress Note    Patient name Keily Fay  Location ICU 03/ICU 03 MRN [id number]  : 1985 Date 2023       LOS (days): 16  Geometric Mean LOS (GMLOS) (days):   Days to GMLOS:        OBJECTIVE:      Current admission status: Inpatient  Preferred Pharmacy:   [address], [address] [facility]  [address]  Phone: [phone] Fax: [phone]    Primary Care Provider: Rodriguez Rodriguez MD    Primary Insurance: [facility]  Secondary Insurance:     PROGRESS NOTE:    SW received a message from unit clerk that sister Giovany Guo was requesting a call from CM. SW spoke by phone with Giovany Guo and she stated that she wanted to reiterate that family is not interested in [facility] or SNF placement for patient as they have tried this in the past and were not happy. Patient lives with Giovany Guo and family's plan is for him to return home at discharge. Patient has been accepted by [facility] and family is in the process of having patient approved for in-home waiver services through [employer] per Giovany Guo. CM will continue to follow.

## 2023-09-09 NOTE — ASSESSMENT & PLAN NOTE
Patient presented with hypoxia 8/23, found to have right lower lobe segmental PE. Initially saturating well on nasal cannula. Rapid response was called 8/24 secondary to hypoxia and altered mental status requiring emergent intubation. Hypoxic post intubation requiring high ventilator settings. · CTA PE study 8/23: Pulmonary embolism in the right lower lobe segmental pulmonary artery. Measured RV/LV ratio is within normal limits at less than 0.9.  · Initial CXR post intubation: Near complete opacity of the left lung likely related to worsening infiltrate and/or atelectasis. Patchy right upper lobe infiltrates. · Strep pneumo/legionella urine antigen negative  · COVID/Flu/RSV negative  · 8/25: Bronchoscopy: poorly tolerated secondary to hypoxia. Some mucus plugging present on the left. · 8/26: CT chest: Complete right lower lobe and near complete left lower lobe atelectasis. Pneumonia not excluded in the appropriate clinical setting. Mild interstitial edema with trace effusions  · Received diuresis with IV Lasix   · CXR 9/5: Improved aeration of the bilateral lungs with mild bibasilar atelectasis. · Completed Zosyn x 7 days (8/25-8/31) - MRSA nares neg  · Tolerates pressure support during the day, but still requires full vent support HS secondary to tachypnea/secretions. Plan:  · Current vent settings: AC/VC 14/500/6/40%, PS 12/6 40% during the day  · Day 17 on Vent  · Continues to be weak.  Unclear if etiology is neurologic/neuromuscular vs psychological (?conversion disorder)  · NIF -8 to -14  · Trach/PEG planned placed 9/8  · Wean Fio2 for goal Sp02 >90  · Continue pulmonary hygiene   · VAP ppx; chlorhexidine, PPI, HOB greater than 30 degrees

## 2023-09-09 NOTE — PROGRESS NOTES
[id number] [facility]  Progress Note  Name: Aden Messina  MRN: [id number]  Unit/Bed#: ICU 03 I Date of Admission: 8/24/2023   Date of Service: 9/9/2023 I Hospital Day: 16    Assessment/Plan   * Acute respiratory failure with hypoxia [facility])  Assessment & Plan  Patient presented with hypoxia 8/23, found to have right lower lobe segmental PE. Initially saturating well on nasal cannula. Rapid response was called 8/24 secondary to hypoxia and altered mental status requiring emergent intubation. Hypoxic post intubation requiring high ventilator settings. · CTA PE study 8/23: Pulmonary embolism in the right lower lobe segmental pulmonary artery. Measured RV/LV ratio is within normal limits at less than 0.9.  · Initial CXR post intubation: Near complete opacity of the left lung likely related to worsening infiltrate and/or atelectasis. Patchy right upper lobe infiltrates. · Strep pneumo/legionella urine antigen negative  · COVID/Flu/RSV negative  · 8/25: Bronchoscopy: poorly tolerated secondary to hypoxia. Some mucus plugging present on the left. · 8/26: CT chest: Complete right lower lobe and near complete left lower lobe atelectasis. Pneumonia not excluded in the appropriate clinical setting. Mild interstitial edema with trace effusions  · Received diuresis with IV Lasix   · CXR 9/5: Improved aeration of the bilateral lungs with mild bibasilar atelectasis. · Completed Zosyn x 7 days (8/25-8/31) - MRSA nares neg  · Tolerates pressure support during the day, but still requires full vent support HS secondary to tachypnea/secretions. Plan:  · Current vent settings: AC/VC 14/500/6/40%, PS 12/6 40% during the day  · Day 17 on Vent  · Continues to be weak.  Unclear if etiology is neurologic/neuromuscular vs psychological (?conversion disorder)  · NIF -8 to -14  · Trach/PEG planned placed 9/8  · Wean Fio2 for goal Sp02 >90  · Continue pulmonary hygiene   · VAP ppx; chlorhexidine, PPI, HOB greater than 30 degrees    Toxic metabolic encephalopathy  Assessment & Plan  [facility] initially presented to [facility] on [facility] with hypoxia and encephalopathy. Acute change in GCS prompted rapid response and subsequent intubation for airway protection. Of note, pt w/ recent hospitalization to [facility] 8/11-8/17 for encephalopathy with unclear etiology despite extensive workup but Wernicke's encephalopathy was on the differential.    ·  [place] workup 8/11:  · Rapid response and intubation on 8/12 for airway protection, extubated on 8/13  · CT head 8/11: No acute intracranial abnormality. Stable small bilateral subcortical hypoattenuating foci. No associated mass effect. · MRI 8/12: No acute intracranial abnormality. Stable nonspecific enhancement along the anterior floor of the third ventricle/hypothalamus compared to March 2023 study. Findings below:  · Differentials includes chronic toxic metabolic insult (thiamine deficiency), chronic inflammatory/infectious conditions  · Other considerations include Langerhans cell cell histiocytosis, lymphocytic hypophysitis, and neurosyphilis. · Primary CNS lymphoma less likely consideration, given the lack of interval change. Serologic correlation is recommended. Stable cerebral white matter signal abnormality, presumably the sequela of chronic microangiopathic disease given the patient's history  · VEEG 8/12: negative for seizure activity. Possible findings relating to underlying sleep disorder   · LP: Grossly negative overall  · Meningitis/encephalitis panel negative, bacterial/fungal culture and gram negative, lyme and cryptococcal negative  · Paraneoplastic panel neg  · Unable to obtain CSF VDRL  · DSPO: Treated with high dose thiamine. Psych evaluated, possible bipolar disorder.  Discharged to home with his sister with visiting nurses, speech therapy, PT/OT  ·  [facility] ([place]) 8/23: Presented with hypoxia found by visiting nurse SPO2 in 80's  · 8/24: Patient's girlfriend visited [facility] and found him unresponsive. Notes said that 'eyes were rolled back with secretions around his mouth"  · Rapid response called and intubated for hypoxia and low GCS. VBG Post intubation 7.25/76/59/33  · Transferred to [facility] on 8/24  · [facility] 8/24 (Current hospital course)  · CT head 8/24: No acute intracranial abnormality  · 8/25 EEG: No evidence of electrographic seizures. Mild background slowing suggestive of underlying cerebral dysfunction from toxic/metablic/infectious/hypoxic encephalopathy. Clinical correlation is recommended  · On neurological exam: wakes to voice, follows 2 step commands   · Continues to be weak off sedation    Plan:  · Mentation now improved but remains with significant generalized weakness   · Neurology consulted, recommendations as follows:  · Somnolence has been going on for a while, some neuro symptoms after chemo in march. · Ach binding and blocking Ab negative   · MUSK antibody and ACHR modulating Ab pending   · Serum anti-TPO and DARIN Ab's, RPR, neg. CSF paraneoplastic panel neg  · Varicella pending  · Toxoplasma antibodies negative  · Autoimmune labs pending   · VGCC serum test pending   · Completed 7 days of Ceftriaxone on 9/7 to cover for possible neurosyphillis  · Repeat LP 9/6: gram stain negative, HSV negative  · Pending: VDRL, VZV PCR, ACE. · Culture: no growth  · 9/7 started IVIg x 5 days and 9/8 pulse dose steroids x 3 days per neuro recommendations for possible autoimmune component to neuro symptoms   · New MRI pending this AM  · Continue thiamine  · CAM ICU  · Sleep hygiene  · Neurochecks per routine     Acute pulmonary embolism without acute cor pulmonale (HCC)  Assessment & Plan  Presented with hypoxia, initially admitted to the floor on supplemental NC  · CTA PE: Pulmonary embolism in the right lower lobe segmental pulmonary artery. Measured RV/LV ratio is within normal limits at less than 0.9.   · BNP 9, initial HS troponin 9  · Initiated on heparin gtt, transitioned to therapeutic Lovenox on 8/26  · Developed worsening hypoxia 8/24, rapid response was called and patient was intubated for airway protection and hypoxic  · Now requiring high vent setting  · Echo completed 8/25: Left Ventricle: Left ventricular cavity size is normal. Wall thickness is normal. The left ventricular ejection fraction is 60% by visual estimation. . Wall motion is normal. Diastolic function is normal for age. Right Ventricle: Right ventricular cavity size is normal. Systolic function is normal. Normal tricuspid annular plane systolic excursion (TAPSE) > 1.7 cm. Plan:  · 8/26: Heparin converted to therapeutic lovenox on 8/26  · 9/6: Therapeutic lovenox transitioned to Heparin gtt in lenora-procedural period   · Heparin drip held 9/8 AM for trach/PEG. Resume AC today   · Dopplers of LE neg for DVT bilaterally    History of LVH (left ventricular hypertrophy)  Assessment & Plan  Previous echocardiogram 6/2/23 showed EF 74%, normal diastolic dysfunction, mild left ventricular hypertrophy, mild RVSP elevation and mild TR  · PTA Med: Torsemide 20 mg daily  · Evaluated by Heart Failure team in July 2023. LVH thought to be secondary to hypertension, obesity, hx of meth use  · Recommended sleep study at that time   · Echo this admission shows normal EF of 60%, normal LV thickness, no diastolic dysfunction    Depression  Assessment & Plan  · Continue Fluoxetine 10 mg daily  · Evaluated by psychiatry last admission, felt to have depressive mood disorder  · Possibly contributing to weakness on vent despite being off sedation      Seminoma of left testis [facility])  Assessment & Plan  · Testicular cancer s/p left orchiectomy on 12/2022  · Repeat CT scan in January 2023 with enlarging lymph node.   · Began Chemo in March 2023: Etoposide and Cisplatin with plan for 4 cycles, 5 days every 21 days  · Did not complete chemo treatment due to adverse effects  · Stopped Cisplatin after 1 cycle due to painful neuropathy  · Continued Etoposide 2nd cycle  · Resumed Cisplatin and Etoposide 3rd cycle  · After 3rd cycle reported double vision, labile affect and right sided weakness and therefore chemotherapy was stopped  · Stopped chemo on 5/26/23  · MRI demonstrated white matter changes at that time and he was referred to Neurology   · Continue outpatient follow up with Heme/Onc     Hypertension  Assessment & Plan  Hx of hypertension. PTA was on Torsemide 20 mg daily  · Echo this admission showed EF 60%, normal LV thickness, no systolic or diastolic dysfunction  · Hypotension s/p intubation initially requiring Levophed DC'ed on 8/27  · Patient has been normotensive    Umbilical hernia without obstruction and without gangrene  Assessment & Plan  · Umbilical hernia present, easily reducible            ICU Core Measures     Vented Patient  VAP Bundle  VAP bundle ordered     A: Assess, Prevent, and Manage Pain · Has pain been assessed? Yes  · Need for changes to pain regimen? No   B: Both Spontaneous Awakening Trials (SATs) and Spontaneous Breathing Trials (SBTs) · Plan to perform spontaneous awakening trial today? N/A   · Plan to perform spontaneous breathing trial today? Yes   · Obvious barriers to extubation? NA- trach in place   C: Choice of Sedation · RASS Goal: N/A patient not on sedation  · Need for changes to sedation or analgesia regimen? NA   D: Delirium · CAM-ICU: Negative   E: Early Mobility  · Plan for early mobility? Yes   F: Family Engagement · Plan for family engagement today? Yes       Review of Invasive Devices:      Central access plan: SQ port in place, not accessed      Prophylaxis:  VTE VTE covered by:  heparin (porcine), Intravenous  heparin (porcine), Intravenous       Stress Ulcer  covered byomeprazole (PRILOSEC) suspension 2 mg/mL [[id number]]       Subjective    Trach in place limiting subjective data. Denies pain. HPI/24hr events: S/p Trach and PEG placement yesterday.  Continues with generalized weakness. Able to follow commands in all extremities and nods appropriately to some questions. No other changes overnight. Review of Systems   Unable to perform ROS: Other (Trach in place, ROS limited, denies pain)          Objective                            Vitals I/O      Most Recent Min/Max in 24hrs   Temp (!) 96.2 °F (35.7 °C) Temp  Min: 96.2 °F (35.7 °C)  Max: 99.7 °F (37.6 °C)   Pulse (!) 53 Pulse  Min: 51  Max: 92   Resp 21 Resp  Min: 12  Max: 30   /70 BP  Min: 101/69  Max: 128/85   O2 Sat 96 % SpO2  Min: 92 %  Max: 98 %      Intake/Output Summary (Last 24 hours) at 9/9/2023 0533  Last data filed at 9/8/2023 1522  Gross per 24 hour   Intake 669.63 ml   Output --   Net 669.63 ml         Diet NPO     Invasive Monitoring Physical exam   N/A Physical Exam  Eyes:      General: Lids are normal.   Skin:     General: Skin is warm and dry. HENT:      Head: Normocephalic and atraumatic. Mouth/Throat:      Mouth: Mucous membranes are moist.   Cardiovascular:      Rate and Rhythm: Normal rate and regular rhythm. Pulses:           Radial pulses are 2+ on the right side and 2+ on the left side. Dorsalis pedis pulses are 2+ on the right side and 2+ on the left side. Heart sounds: Normal heart sounds. Musculoskeletal:      Right lower leg: Trace Edema present. Left lower leg: Trace Edema present. Abdominal:      General: Bowel sounds are normal. There is no distension. Palpations: Abdomen is soft. Tenderness: There is no abdominal tenderness. There is no guarding. Comments: PEG in place, clamped. Constitutional:       General: He is not in acute distress. Appearance: He is obese. He is ill-appearing. Interventions: He is restrained. Comments: On vent with Trach in place   Pulmonary:      Effort: Pulmonary effort is normal. No accessory muscle usage, respiratory distress or accessory muscle usage. Breath sounds: Rhonchi present.       Comments: Trach sutured in place. Dressing with serosanguineous drainage at site. Suctioning for moderate amount of thick, pink tinged secretions  Psychiatric:         Behavior: Behavior is cooperative. Neurological:      Mental Status: He is easily aroused. He is calm. GCS: GCS eye subscore is 3. GCS verbal subscore is 1. GCS motor subscore is 6. Motor: Weakness (generalized +4/5 strength in all extremities). Vitals and nursing note reviewed.               Diagnostic Studies      EKG: NSR on monitor, rate 50-70s  Imaging: No new imaging in preceding 24h     Medications:  Scheduled PRN   acetaminophen, 650 mg, Daily  albuterol, 2.5 mg, Q6H  chlorhexidine, 15 mL, Q12H ISAEL  diphenhydrAMINE, 50 mg, Daily  FLUoxetine, 10 mg, Daily  immune globulin, human, 42.5 g, Q24H  insulin lispro, 2-12 Units, Q6H ISAEL  methylPREDNISolone sodium succinate, 1,000 mg, Daily  metoclopramide, 10 mg, Daily  omeprazole (PRILOSEC) suspension 2 mg/mL, 20 mg, Daily  sodium chloride, 4 mL, Q6H  thiamine, 100 mg, Daily      acetaminophen, 650 mg, Q4H PRN  fentanyl citrate (PF), 50 mcg, Q1H PRN  heparin (porcine), 10,000 Units, Q6H PRN  heparin (porcine), 5,000 Units, Q6H PRN  polyethylene glycol, 17 g, Daily PRN  senna, 17.6 mg, Daily PRN       Continuous          Labs:    CBC    No recent results  BMP    No recent results    Coags    Recent Labs     09/07/23  0705 09/08/23  0541   PTT 83* 116*        Additional Electrolytes  No recent results       Blood Gas    No recent results  No recent results LFTs  No recent results    Infectious  No recent results  Glucose  No recent results            AMBER Patel

## 2023-09-09 NOTE — ASSESSMENT & PLAN NOTE
Patient initially presented to [facility] on 8/23 with hypoxia and encephalopathy. Acute change in GCS prompted rapid response and subsequent intubation for airway protection. Of note, pt w/ recent hospitalization to [facility] 8/11-8/17 for encephalopathy with unclear etiology despite extensive workup but Wernicke's encephalopathy was on the differential.    ·  [place] workup 8/11:  · Rapid response and intubation on 8/12 for airway protection, extubated on 8/13  · CT head 8/11: No acute intracranial abnormality. Stable small bilateral subcortical hypoattenuating foci. No associated mass effect. · MRI 8/12: No acute intracranial abnormality. Stable nonspecific enhancement along the anterior floor of the third ventricle/hypothalamus compared to March 2023 study. Findings below:  · Differentials includes chronic toxic metabolic insult (thiamine deficiency), chronic inflammatory/infectious conditions  · Other considerations include Langerhans cell cell histiocytosis, lymphocytic hypophysitis, and neurosyphilis. · Primary CNS lymphoma less likely consideration, given the lack of interval change. Serologic correlation is recommended. Stable cerebral white matter signal abnormality, presumably the sequela of chronic microangiopathic disease given the patient's history  · VEEG 8/12: negative for seizure activity. Possible findings relating to underlying sleep disorder   · LP: Grossly negative overall  · Meningitis/encephalitis panel negative, bacterial/fungal culture and gram negative, lyme and cryptococcal negative  · Paraneoplastic panel neg  · Unable to obtain CSF VDRL  · DSPO: Treated with high dose thiamine. Psych evaluated, possible bipolar disorder. Discharged to home with his sister with visiting nurses, speech therapy, PT/OT  · [facility] 8/23: Presented with hypoxia found by visiting nurse SPO2 in 80's  · 8/24: Patient's girlfriend visited [facility] and found him unresponsive.  Notes said that 'eyes were rolled back with secretions around his mouth"  · Rapid response called and intubated for hypoxia and low GCS. VBG Post intubation 7.25/76/59/33  · Transferred to [facility] on 8/24  ·  [facility] 8/24 (Current hospital course)  · CT head 8/24: No acute intracranial abnormality  · 8/25 EEG: No evidence of electrographic seizures. Mild background slowing suggestive of underlying cerebral dysfunction from toxic/metablic/infectious/hypoxic encephalopathy. Clinical correlation is recommended  · On neurological exam: wakes to voice, follows 2 step commands   · Continues to be weak off sedation    Plan:  · Mentation now improved but remains with significant generalized weakness   · Neurology consulted, recommendations as follows:  · Somnolence has been going on for a while, some neuro symptoms after chemo in march. · Ach binding and blocking Ab negative   · MUSK antibody and ACHR modulating Ab pending   · Serum anti-TPO and DARIN Ab's, RPR, neg. CSF paraneoplastic panel neg  · Varicella pending  · Toxoplasma antibodies negative  · Autoimmune labs pending   · VGCC serum test pending   · Completed 7 days of Ceftriaxone on 9/7 to cover for possible neurosyphillis  · Repeat LP 9/6: gram stain negative, HSV negative  · Pending: VDRL, VZV PCR, ACE.    · Culture: no growth  · 9/7 started IVIg x 5 days and 9/8 pulse dose steroids x 3 days per neuro recommendations for possible autoimmune component to neuro symptoms   · New MRI pending this AM  · Continue thiamine  · CAM ICU  · Sleep hygiene  · Neurochecks per routine

## 2023-09-09 NOTE — RESPIRATORY THERAPY NOTE
Pt had been transported to [facility] on portable ventilator same settings, SpO2 stable throughout, resumed 840 ventilator upon return to ICU, tolerated well

## 2023-09-09 NOTE — ASSESSMENT & PLAN NOTE
Presented with hypoxia, initially admitted to the floor on supplemental NC  · CTA PE: Pulmonary embolism in the right lower lobe segmental pulmonary artery. Measured RV/LV ratio is within normal limits at less than 0.9. · BNP 9, initial HS troponin 9  · Initiated on heparin gtt, transitioned to therapeutic Lovenox on 8/26  · Developed worsening hypoxia 8/24, rapid response was called and patient was intubated for airway protection and hypoxic  · Now requiring high vent setting  · Echo completed 8/25: Left Ventricle: Left ventricular cavity size is normal. Wall thickness is normal. The left ventricular ejection fraction is 60% by visual estimation. . Wall motion is normal. Diastolic function is normal for age. Right Ventricle: Right ventricular cavity size is normal. Systolic function is normal. Normal tricuspid annular plane systolic excursion (TAPSE) > 1.7 cm. Plan:  · 8/26: Heparin converted to therapeutic lovenox on 8/26  · 9/6: Therapeutic lovenox transitioned to Heparin gtt in lenora-procedural period   · Heparin drip held 9/8 AM for trach/PEG.  Resume AC today   · Dopplers of LE neg for DVT bilaterally

## 2023-09-10 LAB
APTT PPP: 85 SECONDS (ref 23–37)
APTT PPP: 90 SECONDS (ref 23–37)
GLUCOSE SERPL-MCNC: 122 MG/DL (ref 65–140)
GLUCOSE SERPL-MCNC: 141 MG/DL (ref 65–140)
GLUCOSE SERPL-MCNC: 173 MG/DL (ref 65–140)
GLUCOSE SERPL-MCNC: 175 MG/DL (ref 65–140)
VZV DNA CSF QL NAA+PROBE: NEGATIVE

## 2023-09-10 PROCEDURE — 94150 VITAL CAPACITY TEST: CPT

## 2023-09-10 PROCEDURE — 85730 THROMBOPLASTIN TIME PARTIAL: CPT | Performed by: ANESTHESIOLOGY

## 2023-09-10 PROCEDURE — 94003 VENT MGMT INPAT SUBQ DAY: CPT

## 2023-09-10 PROCEDURE — 94669 MECHANICAL CHEST WALL OSCILL: CPT

## 2023-09-10 PROCEDURE — 94640 AIRWAY INHALATION TREATMENT: CPT

## 2023-09-10 PROCEDURE — 93005 ELECTROCARDIOGRAM TRACING: CPT

## 2023-09-10 PROCEDURE — 82948 REAGENT STRIP/BLOOD GLUCOSE: CPT

## 2023-09-10 PROCEDURE — 94760 N-INVAS EAR/PLS OXIMETRY 1: CPT

## 2023-09-10 PROCEDURE — 94668 MNPJ CHEST WALL SBSQ: CPT

## 2023-09-10 PROCEDURE — 99232 SBSQ HOSP IP/OBS MODERATE 35: CPT | Performed by: ANESTHESIOLOGY

## 2023-09-10 PROCEDURE — NC001 PR NO CHARGE: Performed by: SURGERY

## 2023-09-10 RX ORDER — CHOLECALCIFEROL (VITAMIN D3) 10(400)/ML
1000 DROPS ORAL DAILY
Status: DISCONTINUED | OUTPATIENT
Start: 2023-09-10 | End: 2023-09-11

## 2023-09-10 RX ORDER — DIPHENHYDRAMINE HYDROCHLORIDE 50 MG/ML
25 INJECTION INTRAMUSCULAR; INTRAVENOUS DAILY
Status: COMPLETED | OUTPATIENT
Start: 2023-09-11 | End: 2023-09-11

## 2023-09-10 RX ORDER — ACETAMINOPHEN 160 MG/5ML
650 SUSPENSION ORAL DAILY
Status: COMPLETED | OUTPATIENT
Start: 2023-09-10 | End: 2023-09-11

## 2023-09-10 RX ORDER — CHOLECALCIFEROL (VITAMIN D3) 10(400)/ML
1000 DROPS ORAL DAILY
Status: CANCELLED | OUTPATIENT
Start: 2023-09-11

## 2023-09-10 RX ADMIN — SODIUM CHLORIDE SOLN NEBU 3% 4 ML: 3 NEBU SOLN at 02:37

## 2023-09-10 RX ADMIN — THIAMINE HCL TAB 100 MG 100 MG: 100 TAB at 08:21

## 2023-09-10 RX ADMIN — ALBUTEROL SULFATE 2.5 MG: 2.5 SOLUTION RESPIRATORY (INHALATION) at 13:03

## 2023-09-10 RX ADMIN — HEPARIN SODIUM 15 UNITS/KG/HR: 10000 INJECTION, SOLUTION INTRAVENOUS at 08:49

## 2023-09-10 RX ADMIN — DIPHENHYDRAMINE HYDROCHLORIDE 50 MG: 50 INJECTION, SOLUTION INTRAMUSCULAR; INTRAVENOUS at 15:04

## 2023-09-10 RX ADMIN — ACETAMINOPHEN 650 MG: 650 SUSPENSION ORAL at 15:04

## 2023-09-10 RX ADMIN — METOCLOPRAMIDE 10 MG: 5 INJECTION, SOLUTION INTRAMUSCULAR; INTRAVENOUS at 15:04

## 2023-09-10 RX ADMIN — FLUOXETINE 10 MG: 10 CAPSULE ORAL at 08:21

## 2023-09-10 RX ADMIN — INSULIN LISPRO 2 UNITS: 100 INJECTION, SOLUTION INTRAVENOUS; SUBCUTANEOUS at 23:50

## 2023-09-10 RX ADMIN — ALBUTEROL SULFATE 2.5 MG: 2.5 SOLUTION RESPIRATORY (INHALATION) at 07:22

## 2023-09-10 RX ADMIN — ALBUTEROL SULFATE 2.5 MG: 2.5 SOLUTION RESPIRATORY (INHALATION) at 19:20

## 2023-09-10 RX ADMIN — SODIUM CHLORIDE SOLN NEBU 3% 4 ML: 3 NEBU SOLN at 13:17

## 2023-09-10 RX ADMIN — APIXABAN 5 MG: 5 TABLET, FILM COATED ORAL at 17:28

## 2023-09-10 RX ADMIN — Medication 42.5 G: at 15:30

## 2023-09-10 RX ADMIN — SODIUM CHLORIDE SOLN NEBU 3% 4 ML: 3 NEBU SOLN at 19:20

## 2023-09-10 RX ADMIN — ALBUTEROL SULFATE 2.5 MG: 2.5 SOLUTION RESPIRATORY (INHALATION) at 02:37

## 2023-09-10 RX ADMIN — CHLORHEXIDINE GLUCONATE 15 ML: 1.2 RINSE ORAL at 08:21

## 2023-09-10 RX ADMIN — CHLORHEXIDINE GLUCONATE 15 ML: 1.2 RINSE ORAL at 20:41

## 2023-09-10 RX ADMIN — Medication 20 MG: at 08:21

## 2023-09-10 RX ADMIN — INSULIN LISPRO 2 UNITS: 100 INJECTION, SOLUTION INTRAVENOUS; SUBCUTANEOUS at 06:02

## 2023-09-10 RX ADMIN — SODIUM CHLORIDE SOLN NEBU 3% 4 ML: 3 NEBU SOLN at 07:33

## 2023-09-10 RX ADMIN — SODIUM CHLORIDE 1000 MG: 0.9 INJECTION, SOLUTION INTRAVENOUS at 09:49

## 2023-09-10 NOTE — ASSESSMENT & PLAN NOTE
Presented with hypoxia, initially admitted to the floor on supplemental NC  · CTA PE: Pulmonary embolism in the right lower lobe segmental pulmonary artery. Measured RV/LV ratio is within normal limits at less than 0.9. · BNP 9, initial HS troponin 9  · Initiated on heparin gtt, transitioned to therapeutic Lovenox on 8/26  · Developed worsening hypoxia 8/24, rapid response was called and patient was intubated for airway protection and hypoxic  · Initially requiring high vent setting  · Echo completed 8/25: Left Ventricle: Left ventricular cavity size is normal. Wall thickness is normal. The left ventricular ejection fraction is 60% by visual estimation. . Wall motion is normal. Diastolic function is normal for age. Right Ventricle: Right ventricular cavity size is normal. Systolic function is normal. Normal tricuspid annular plane systolic excursion (TAPSE) > 1.7 cm.     Plan:  · 8/26: Heparin converted to therapeutic lovenox on 8/26  · 9/6: Therapeutic lovenox transitioned to Heparin gtt in lenora-procedural period   · Heparin drip resumed 9/9  · Dopplers of LE neg for DVT bilaterally

## 2023-09-10 NOTE — PLAN OF CARE
Problem: Prexisting or High Potential for Compromised Skin Integrity  Goal: Skin integrity is maintained or improved  Description: INTERVENTIONS:  - Identify patients at risk for skin breakdown  - Assess and monitor skin integrity  - Assess and monitor nutrition and hydration status  - Monitor labs   - Assess for incontinence   - Turn and reposition patient  - Assist with mobility/ambulation  - Relieve pressure over bony prominences  - Avoid friction and shearing  - Provide appropriate hygiene as needed including keeping skin clean and dry  - Evaluate need for skin moisturizer/barrier cream  - Collaborate with interdisciplinary team   - Patient/family teaching  - Consider wound care consult   Outcome: Progressing     Problem: MOBILITY - ADULT  Goal: Maintain or return to baseline ADL function  Description: INTERVENTIONS:  -  Assess patient's ability to carry out ADLs; assess patient's baseline for ADL function and identify physical deficits which impact ability to perform ADLs (bathing, care of mouth/teeth, toileting, grooming, dressing, etc.)  - Assess/evaluate cause of self-care deficits   - Assess range of motion  - Assess patient's mobility; develop plan if impaired  - Assess patient's need for assistive devices and provide as appropriate  - Encourage maximum independence but intervene and supervise when necessary  - Involve family in performance of ADLs  - Assess for home care needs following discharge   - Consider OT consult to assist with ADL evaluation and planning for discharge  - Provide patient education as appropriate  Outcome: Progressing  Goal: Maintains/Returns to pre admission functional level  Description: INTERVENTIONS:  - Perform BMAT or MOVE assessment daily.   - Set and communicate daily mobility goal to care team and patient/family/caregiver. - Collaborate with rehabilitation services on mobility goals if consulted  - Perform Range of Motion 4 times a day.   - Reposition patient every 2 hours.  -- Record patient progress and toleration of activity level   Outcome: Progressing     Problem: CARDIOVASCULAR - ADULT  Goal: Maintains optimal cardiac output and hemodynamic stability  Description: INTERVENTIONS:  - Monitor I/O, vital signs and rhythm  - Monitor for S/S and trends of decreased cardiac output  - Administer and titrate ordered vasoactive medications to optimize hemodynamic stability  - Assess quality of pulses, skin color and temperature  - Assess for signs of decreased coronary artery perfusion  - Instruct patient to report change in severity of symptoms  Outcome: Progressing  Goal: Absence of cardiac dysrhythmias or at baseline rhythm  Description: INTERVENTIONS:  - Continuous cardiac monitoring, vital signs, obtain 12 lead EKG if ordered  - Administer antiarrhythmic and heart rate control medications as ordered  - Monitor electrolytes and administer replacement therapy as ordered  Outcome: Progressing     Problem: GENITOURINARY - ADULT  Goal: Maintains or returns to baseline urinary function  Description: INTERVENTIONS:  - Assess urinary function  - Encourage oral fluids to ensure adequate hydration if ordered  - Administer IV fluids as ordered to ensure adequate hydration  - Administer ordered medications as needed  - Offer frequent toileting  - Follow urinary retention protocol if ordered  Outcome: Progressing  Goal: Absence of urinary retention  Description: INTERVENTIONS:  - Assess patient’s ability to void and empty bladder  - Monitor I/O  - Bladder scan as needed  - Discuss with physician/AP medications to alleviate retention as needed  - Discuss catheterization for long term situations as appropriate  Outcome: Progressing     Problem: METABOLIC, FLUID AND ELECTROLYTES - ADULT  Goal: Electrolytes maintained within normal limits  Description: INTERVENTIONS:  - Monitor labs and assess patient for signs and symptoms of electrolyte imbalances  - Administer electrolyte replacement as ordered  - Monitor response to electrolyte replacements, including repeat lab results as appropriate  - Instruct patient on fluid and nutrition as appropriate  Outcome: Progressing  Goal: Fluid balance maintained  Description: INTERVENTIONS:  - Monitor labs   - Monitor I/O and WT  - Instruct patient on fluid and nutrition as appropriate  - Assess for signs & symptoms of volume excess or deficit  Outcome: Progressing     Problem: SKIN/TISSUE INTEGRITY - ADULT  Goal: Skin Integrity remains intact(Skin Breakdown Prevention)  Description: Assess:  -Perform Adria assessment every shift   -Clean and moisturize skin every 2 hours and PRN   -Inspect skin when repositioning, toileting, and assisting with ADLS  -Assess under medical devices such as cardiac monitor wires IV tubing every 2 hours and PRN   -Assess extremities for adequate circulation and sensation     Bed Management:  -Have minimal linens on bed & keep smooth, unwrinkled  -Change linens as needed when moist or perspiring  -Avoid sitting or lying in one position for more than 2 hours while in bed  -Keep HOB at 30degrees     Toileting:  -Offer bedside commode  -Assess for incontinence every 2 hours  -Use incontinent care products after each incontinent episode such as pads    Activity:  -Mobilize patient 3 times a day  -Encourage or provide ROM exercises   -Turn and reposition patient every 2 Hours  -Use appropriate equipment to lift or move patient in bed  -Instruct/ Assist with weight shifting every hour when out of bed in chair      Skin Care:  -Avoid use of baby powder, tape, friction and shearing, hot water or constrictive clothing  -Relieve pressure over bony prominences using foam dressing  -Do not massage red bony areas    Next Steps:  -Consider consults to  interdisciplinary teams such as PT/OT  Outcome: Progressing  Goal: Pressure injury heals and does not worsen  Description: Interventions:  - Implement low air loss mattress or specialty surface (Criteria met)  - Apply silicone foam dressing  - Instruct/assist with weight shifting every 60  minutes when in chair   - Limit chair time to 2 hour intervals  - Use special pressure reducing interventions such as EHOB when in chair   - Apply fecal or urinary incontinence containment device   - Perform passive or active ROM every 2 hours   - Turn and reposition patient & offload bony prominences every 2 hours   - Utilize friction reducing device or surface for transfers   - Consider consults to  interdisciplinary teams such as PT/OT  - Use incontinent care products after each incontinent episode such as pads   - Consider nutrition services referral as needed  Outcome: Progressing     Problem: HEMATOLOGIC - ADULT  Goal: Maintains hematologic stability  Description: INTERVENTIONS  - Assess for signs and symptoms of bleeding or hemorrhage  - Monitor labs  - Administer supportive blood products/factors as ordered and appropriate  Outcome: Progressing     Problem: MUSCULOSKELETAL - ADULT  Goal: Maintain or return mobility to safest level of function  Description: INTERVENTIONS:  - Assess patient's ability to carry out ADLs; assess patient's baseline for ADL function and identify physical deficits which impact ability to perform ADLs (bathing, care of mouth/teeth, toileting, grooming, dressing, etc.)  - Assess/evaluate cause of self-care deficits   - Assess range of motion  - Assess patient's mobility  - Assess patient's need for assistive devices and provide as appropriate  - Encourage maximum independence but intervene and supervise when necessary  - Involve family in performance of ADLs  - Assess for home care needs following discharge   - Consider OT consult to assist with ADL evaluation and planning for discharge  - Provide patient education as appropriate  Outcome: Progressing  Goal: Maintain proper alignment of affected body part  Description: INTERVENTIONS:  - Support, maintain and protect limb and body alignment  - Provide patient/ family with appropriate education  Outcome: Progressing     Problem: PAIN - ADULT  Goal: Verbalizes/displays adequate comfort level or baseline comfort level  Description: Interventions:  - Encourage patient to monitor pain and request assistance  - Assess pain using appropriate pain scale  - Administer analgesics based on type and severity of pain and evaluate response  - Implement non-pharmacological measures as appropriate and evaluate response  - Consider cultural and social influences on pain and pain management  - Notify physician/advanced practitioner if interventions unsuccessful or patient reports new pain  Outcome: Progressing     Problem: INFECTION - ADULT  Goal: Absence or prevention of progression during hospitalization  Description: INTERVENTIONS:  - Assess and monitor for signs and symptoms of infection  - Monitor lab/diagnostic results  - Monitor all insertion sites, i.e. indwelling lines, tubes, and drains  - Monitor endotracheal if appropriate and nasal secretions for changes in amount and color  - Rice appropriate cooling/warming therapies per order  - Administer medications as ordered  - Instruct and encourage patient and family to use good hand hygiene technique  - Identify and instruct in appropriate isolation precautions for identified infection/condition  Outcome: Progressing     Problem: SAFETY ADULT  Goal: Maintain or return to baseline ADL function  Description: INTERVENTIONS:  -  Assess patient's ability to carry out ADLs; assess patient's baseline for ADL function and identify physical deficits which impact ability to perform ADLs (bathing, care of mouth/teeth, toileting, grooming, dressing, etc.)  - Assess/evaluate cause of self-care deficits   - Assess range of motion  - Assess patient's mobility; develop plan if impaired  - Assess patient's need for assistive devices and provide as appropriate  - Encourage maximum independence but intervene and supervise when necessary  - Involve family in performance of ADLs  - Assess for home care needs following discharge   - Consider OT consult to assist with ADL evaluation and planning for discharge  - Provide patient education as appropriate  Outcome: Progressing  Goal: Maintains/Returns to pre admission functional level  Description: INTERVENTIONS:  - Perform BMAT or MOVE assessment daily.   - Set and communicate daily mobility goal to care team and patient/family/caregiver. - Collaborate with rehabilitation services on mobility goals if consulted  - Perform Range of Motion 4 times a day. - Reposition patient every 2 hours. -- Record patient progress and toleration of activity level   Outcome: Progressing  Goal: Patient will remain free of falls  Description: INTERVENTIONS:  - Educate patient/family on patient safety including physical limitations  - Instruct patient to call for assistance with activity   - Consult OT/PT to assist with strengthening/mobility   - Keep Call bell within reach  - Keep bed low and locked with side rails adjusted as appropriate  - Keep care items and personal belongings within reach  - Initiate and maintain comfort rounds  - Make Fall Risk Sign visible to staff  - Offer Toileting every 2 Hours, in advance of need  - Initiate/Maintain bed alarm  - Apply yellow socks and bracelet for high fall risk patients  - Consider moving patient to room near nurses station  Outcome: Progressing     Problem: Knowledge Deficit  Goal: Patient/family/caregiver demonstrates understanding of disease process, treatment plan, medications, and discharge instructions  Description: Complete learning assessment and assess knowledge base.   Interventions:  - Provide teaching at level of understanding  - Provide teaching via preferred learning methods  Outcome: Progressing     Problem: RESPIRATORY - ADULT  Goal: Achieves optimal ventilation and oxygenation  Description: INTERVENTIONS:  - Assess for changes in respiratory status  - Assess for changes in mentation and behavior  - Position to facilitate oxygenation and minimize respiratory effort  - Oxygen administered by appropriate delivery if ordered  - Initiate smoking cessation education as indicated  - Encourage broncho-pulmonary hygiene including cough, deep breathe, Incentive Spirometry  - Assess the need for suctioning and aspirate as needed  - Assess and instruct to report SOB or any respiratory difficulty  - Respiratory Therapy support as indicated  Outcome: Progressing     Problem: SAFETY,RESTRAINT: NV/NON-SELF DESTRUCTIVE BEHAVIOR  Goal: Remains free of harm/injury (restraint for non violent/non self-detsructive behavior)  Description: INTERVENTIONS:  - Instruct patient/family regarding restraint use   - Assess and monitor physiologic and psychological status   - Provide interventions and comfort measures to meet assessed patient needs   - Identify and implement measures to help patient regain control  - Assess readiness for release of restraint   Outcome: Progressing  Goal: Returns to optimal restraint-free functioning  Description: INTERVENTIONS:  - Assess the patient's behavior and symptoms that indicate continued need for restraint  - Identify and implement measures to help patient regain control  - Assess readiness for release of restraint   Outcome: Progressing     Problem: Nutrition/Hydration-ADULT  Goal: Nutrient/Hydration intake appropriate for improving, restoring or maintaining nutritional needs  Description: Monitor and assess patient's nutrition/hydration status for malnutrition. Collaborate with interdisciplinary team and initiate plan and interventions as ordered. Monitor patient's weight and dietary intake as ordered or per policy. Utilize nutrition screening tool and intervene as necessary. Determine patient's food preferences and provide high-protein, high-caloric foods as appropriate.      INTERVENTIONS:  - Monitor oral intake, urinary output, labs, and treatment plans  - Assess nutrition and hydration status and recommend course of action  - Evaluate amount of meals eaten  - Assist patient with eating if necessary   - Allow adequate time for meals  - Recommend/ encourage appropriate diets, oral nutritional supplements, and vitamin/mineral supplements  - Order, calculate, and assess calorie counts as needed  - Recommend, monitor, and adjust tube feedings   - Assess need for intravenous fluids  - Provide specific nutrition/hydration education as appropriate  - Include patient/family/caregiver in decisions related to nutrition  Outcome: Progressing

## 2023-09-10 NOTE — RESPIRATORY THERAPY NOTE
Received patient on AC/Vc 14/500/40% +6 tolerating therapy. Switched him over to PS 12/6 40% tolerating therapy. Restraints secure. Bed therapy performed and treatment given.

## 2023-09-10 NOTE — PROGRESS NOTES
[id number] [facility]  Progress Note  Name: Vance Watkins  MRN: [id number]  Unit/Bed#: ICU 03 I Date of Admission: 8/24/2023   Date of Service: 9/10/2023 I Hospital Day: 17    Assessment/Plan   * Acute respiratory failure with hypoxia [facility])  Assessment & Plan  Patient presented with hypoxia 8/23, found to have right lower lobe segmental PE. Initially saturating well on nasal cannula. Rapid response was called 8/24 secondary to hypoxia and altered mental status requiring emergent intubation. Hypoxic post intubation requiring high ventilator settings. · CTA PE study 8/23: Pulmonary embolism in the right lower lobe segmental pulmonary artery. Measured RV/LV ratio is within normal limits at less than 0.9.  · Initial CXR post intubation: Near complete opacity of the left lung likely related to worsening infiltrate and/or atelectasis. Patchy right upper lobe infiltrates. · Strep pneumo/legionella urine antigen negative  · COVID/Flu/RSV negative  · 8/25: Bronchoscopy: poorly tolerated secondary to hypoxia. Some mucus plugging present on the left. · 8/26: CT chest: Complete right lower lobe and near complete left lower lobe atelectasis. Pneumonia not excluded in the appropriate clinical setting. Mild interstitial edema with trace effusions  · Received diuresis with IV Lasix   · CXR 9/5: Improved aeration of the bilateral lungs with mild bibasilar atelectasis. · Completed Zosyn x 7 days (8/25-8/31) - MRSA nares neg  · Tolerates pressure support during the day, but still requires full vent support HS secondary to tachypnea/secretions. Plan:  · Current vent settings: AC/VC 14/500/40/6, PS 12/6 40% during the day  · Day 18 on Vent  · Continues to be weak.  Unclear if etiology is neurologic/neuromuscular vs psychological (?conversion disorder)  · NIF -8 to -14  · Trach/PEG placed 9/8  · Wean Fio2 for goal Sp02 >90  · Continue pulmonary hygiene   · VAP ppx; chlorhexidine, PPI, HOB greater than 30 degrees    Toxic metabolic encephalopathy  Assessment & Plan  Patient initially presented to [facility] on 8/23 with hypoxia and encephalopathy. Acute change in GCS prompted rapid response and subsequent intubation for airway protection. Of note, pt w/ [facility] [place] [facility]11-8/17 for encephalopathy with unclear etiology despite extensive workup but Wernicke's encephalopathy was on the differential.    ·  [place] workup 8/11:  · Rapid response and intubation on 8/12 for airway protection, extubated on 8/13  · CT head 8/11: No acute intracranial abnormality. Stable small bilateral subcortical hypoattenuating foci. No associated mass effect. · MRI 8/12: No acute intracranial abnormality. Stable nonspecific enhancement along the anterior floor of the third ventricle/hypothalamus compared to March 2023 study. Findings below:  · Differentials includes chronic toxic metabolic insult (thiamine deficiency), chronic inflammatory/infectious conditions  · Other considerations include Langerhans cell cell histiocytosis, lymphocytic hypophysitis, and neurosyphilis. · Primary CNS lymphoma less likely consideration, given the lack of interval change. Serologic correlation is recommended. Stable cerebral white matter signal abnormality, presumably the sequela of chronic microangiopathic disease given the patient's history  · VEEG 8/12: negative for seizure activity. Possible findings relating to underlying sleep disorder   · LP: Grossly negative overall  · Meningitis/encephalitis panel negative, bacterial/fungal culture and gram negative, lyme and cryptococcal negative  · Paraneoplastic panel neg  · Unable to obtain CSF VDRL  · DSPO: Treated with high dose thiamine. Psych evaluated, possible bipolar disorder.  Discharged to home with his sister with visiting nurses, speech therapy, PT/OT  ·  [facility] ([place] 8/23: Presented with hypoxia found by visiting nurse SPO2 in 80's  · 8/24: Patient's girlfriend visited [facility] and found him unresponsive. Notes said that 'eyes were rolled back with secretions around his mouth"  · Rapid response called and intubated for hypoxia and low GCS. VBG Post intubation 7.25/76/59/33  · Transferred to [facility] on 8/24  · ASHLEY [facility]ala 8/24 (Current hospital course)  · CT head 8/24: No acute intracranial abnormality  · 8/25 EEG: No evidence of electrographic seizures. Mild background slowing suggestive of underlying cerebral dysfunction from toxic/metablic/infectious/hypoxic encephalopathy. Clinical correlation is recommended  · On neurological exam: wakes to voice, follows 2 step commands   · Continues to be weak off sedation    Plan:  · Mentation now improved but remains with significant generalized weakness   · Neurology consulted, recommendations as follows:  · Somnolence has been going on for a while, some neuro symptoms after chemo in march. · Ach binding and blocking Ab negative   · MUSK antibody and ACHR modulating Ab pending   · Serum anti-TPO and DARIN Ab's, RPR, neg. CSF paraneoplastic panel neg  · Varicella pending  · Toxoplasma antibodies negative  · Autoimmune labs pending   · VGCC serum test pending   · Completed 7 days of Ceftriaxone on 9/7 to cover for possible neurosyphillis  · Repeat LP 9/6: gram stain negative, HSV negative  · Pending: VDRL, VZV PCR, ACE. · Culture: no growth  · 9/7 started IVIg x 5 days and 9/8 pulse dose steroids x 3 days per neuro recommendations for possible autoimmune component to neuro symptoms   · MRI 9/9: No acute intracranial pathology. Unremarkable MRI of the orbits. Improving enhancement of the hypothalamus. 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   · Continue thiamine  · CAM ICU  · Sleep hygiene  · Neurochecks per routine     Acute pulmonary embolism without acute cor pulmonale (HCC)  Assessment & Plan  Presented with hypoxia, initially admitted to the floor on supplemental NC  · CTA PE: Pulmonary embolism in the right lower lobe segmental pulmonary artery. Measured RV/LV ratio is within normal limits at less than 0.9. · BNP 9, initial HS troponin 9  · Initiated on heparin gtt, transitioned to therapeutic Lovenox on 8/26  · Developed worsening hypoxia 8/24, rapid response was called and patient was intubated for airway protection and hypoxic  · Initially requiring high vent setting  · Echo completed 8/25: Left Ventricle: Left ventricular cavity size is normal. Wall thickness is normal. The left ventricular ejection fraction is 60% by visual estimation. . Wall motion is normal. Diastolic function is normal for age. Right Ventricle: Right ventricular cavity size is normal. Systolic function is normal. Normal tricuspid annular plane systolic excursion (TAPSE) > 1.7 cm. Plan:  · 8/26: Heparin converted to therapeutic lovenox on 8/26  · 9/6: Therapeutic lovenox transitioned to Heparin gtt in lenora-procedural period   · Heparin drip resumed 9/9  · Dopplers of LE neg for DVT bilaterally    History of LVH (left ventricular hypertrophy)  Assessment & Plan  Previous echocardiogram 6/2/23 showed EF 75%, normal diastolic dysfunction, mild left ventricular hypertrophy, mild RVSP elevation and mild TR  · PTA Med: Torsemide 20 mg daily  · Evaluated by Heart Failure team in July 2023. LVH thought to be secondary to hypertension, obesity, hx of meth use  · Recommended sleep study at that time   · Echo this admission shows normal EF of 60%, normal LV thickness, no diastolic dysfunction    Depression  Assessment & Plan  · Continue Fluoxetine 10 mg daily  · Evaluated by psychiatry last admission, felt to have depressive mood disorder  · Possibly contributing to weakness on vent despite being off sedation      Seminoma of left testis [facility])  Assessment & Plan  · Testicular cancer s/p left orchiectomy on 12/2022  · Repeat CT scan in January 2023 with enlarging lymph node.   · Began Chemo in March 2023: Etoposide and Cisplatin with plan for 4 cycles, 5 days every 21 days  · Did not complete chemo treatment due to adverse effects  · Stopped Cisplatin after 1 cycle due to painful neuropathy  · Continued Etoposide 2nd cycle  · Resumed Cisplatin and Etoposide 3rd cycle  · After 3rd cycle reported double vision, labile affect and right sided weakness and therefore chemotherapy was stopped  · Stopped chemo on 5/26/23  · MRI demonstrated white matter changes at that time and he was referred to Neurology   · Continue outpatient follow up with Heme/Onc     Hypertension  Assessment & Plan  Hx of hypertension. PTA was on Torsemide 20 mg daily  · Echo this admission showed EF 60%, normal LV thickness, no systolic or diastolic dysfunction  · Hypotension s/p intubation initially requiring Levophed DC'ed on 8/27  · Patient has been normotensive    Umbilical hernia without obstruction and without gangrene  Assessment & Plan  · Umbilical hernia present, easily reducible            ICU Core Measures     Vented Patient  VAP Bundle  VAP bundle ordered     A: Assess, Prevent, and Manage Pain · Has pain been assessed? Yes  · Need for changes to pain regimen? No   B: Both Spontaneous Awakening Trials (SATs) and Spontaneous Breathing Trials (SBTs) · Plan to perform spontaneous awakening trial today? N/A   · Plan to perform spontaneous breathing trial today? Yes   · Obvious barriers to extubation? Yes- On vent via Trach   C: Choice of Sedation · RASS Goal: N/A patient not on sedation  · Need for changes to sedation or analgesia regimen? NA   D: Delirium · CAM-ICU: Negative   E: Early Mobility  · Plan for early mobility? Yes   F: Family Engagement · Plan for family engagement today?  Yes       Review of Invasive Devices:      Central access plan: SQ port not accessed      Prophylaxis:  VTE VTE covered by:  heparin (porcine), Intravenous, 15 Units/kg/hr at 09/09/23 3093  heparin (porcine), Intravenous  heparin (porcine), Intravenous       Stress Ulcer  covered byomeprazole (PRILOSEC) suspension 2 mg/mL [[id number]]       Subjective    Limited 2/2 Tracheostomy in place. Denies pain. HPI/24hr events: Repeat MRI completed yesterday; no acute intracranial pathology, improving enhancement of hypothalamus. Tolerated approximately 7 hours of PS then returned to full settings for tachypnea. Tolerating TF via PEG. Heparin drip resumed. No other changes. Review of Systems   Unable to perform ROS: Other (Tracheostomy in place)          Objective                            Vitals I/O      Most Recent Min/Max in 24hrs   Temp (!) 97.1 °F (36.2 °C) Temp  Min: 96.8 °F (36 °C)  Max: 97.6 °F (36.4 °C)   Pulse 66 Pulse  Min: 48  Max: 87   Resp 22 Resp  Min: 14  Max: 37   /74 BP  Min: 99/57  Max: 134/78   O2 Sat 95 % SpO2  Min: 95 %  Max: 100 %      Intake/Output Summary (Last 24 hours) at 9/10/2023 0346  Last data filed at 9/10/2023 0300  Gross per 24 hour   Intake 2542.88 ml   Output --   Net 2542.88 ml         Diet Enteral/Parenteral; Tube Feeding No Oral Diet; Jevity 1.2 Scott; Continuous; 70; Prosource Protein Liquid - One Packet Daily; 200     Invasive Monitoring Physical exam   N/A Physical Exam  Eyes:      General: Lids are normal.   Skin:     General: Skin is warm and dry. HENT:      Head: Normocephalic. Mouth/Throat:      Mouth: Mucous membranes are moist.   Cardiovascular:      Rate and Rhythm: Normal rate and regular rhythm. Pulses:           Radial pulses are 2+ on the right side and 2+ on the left side. Dorsalis pedis pulses are 2+ on the right side and 2+ on the left side. Heart sounds: Normal heart sounds. Musculoskeletal:      Right lower leg: No edema. Left lower leg: No edema. Abdominal:      General: Bowel sounds are normal.      Palpations: Abdomen is soft. Comments: PEG sutured in place. Site clean. Constitutional:       Appearance: He is ill-appearing. Interventions: He is restrained.    Pulmonary:      Effort: Pulmonary effort is normal. No accessory muscle usage, respiratory distress or accessory muscle usage. Breath sounds: Rhonchi present. Comments: Nathanel Eaves in place with small amount of serosang drainage from site  Psychiatric:         Behavior: Behavior is cooperative. Neurological:      GCS: GCS eye subscore is 3. GCS verbal subscore is 1. GCS motor subscore is 6. Motor: Weakness (generalized. +3/5 strength of all extremities). Vitals and nursing note reviewed. Diagnostic Studies      EKG: Sinus bradycardia on monitor, rate 40-50s  Imagin/9 MRI brain: No acute intracranial pathology. Unremarkable MRI of the orbits. Improving enhancement of the hypothalamus. 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      I have personally reviewed pertinent reports.        Medications:  Scheduled PRN   acetaminophen, 650 mg, Daily  albuterol, 2.5 mg, Q6H  chlorhexidine, 15 mL, Q12H ISAEL  cholecalciferol, 1,000 Units, Daily  diphenhydrAMINE, 50 mg, Daily  FLUoxetine, 10 mg, Daily  immune globulin, human, 42.5 g, Q24H  insulin lispro, 2-12 Units, Q6H ISAEL  methylPREDNISolone sodium succinate, 1,000 mg, Daily  metoclopramide, 10 mg, Daily  omeprazole (PRILOSEC) suspension 2 mg/mL, 20 mg, Daily  sodium chloride, 4 mL, Q6H  thiamine, 100 mg, Daily      acetaminophen, 650 mg, Q4H PRN  fentanyl citrate (PF), 50 mcg, Q1H PRN  heparin (porcine), 10,000 Units, Q6H PRN  heparin (porcine), 5,000 Units, Q6H PRN  polyethylene glycol, 17 g, Daily PRN  senna, 17.6 mg, Daily PRN       Continuous    heparin (porcine), 3-30 Units/kg/hr (Order-Specific), Last Rate: 15 Units/kg/hr (23 2336)         Labs:    CBC    Recent Labs     23  0606   WBC 5.57   HGB 10.8*   HCT 34.2*        BMP    Recent Labs     23  0606   SODIUM 148*   K 3.6   *   CO2 27   AGAP 8   BUN 16   CREATININE 0.68   CALCIUM 9.2       Coags    Recent Labs     23  1626 23  4630 PTT 60* 118*        Additional Electrolytes  Recent Labs     09/09/23  0606   MG 2.4   PHOS 3.5   CAIONIZED 1.12          Blood Gas    No recent results  No recent results LFTs  No recent results    Infectious  No recent results  Glucose  Recent Labs     09/09/23  0606   GLUC [facility]

## 2023-09-10 NOTE — QUICK NOTE
Patient's mother updated on clinical status. All questions answered to her satisfaction. She is requesting meeting later this week with social work and ICU team to discuss discharge planning.

## 2023-09-10 NOTE — RESPIRATORY THERAPY NOTE
Transported patient from bed 3 ICU to bed 1 ICU with BVM while Verito Hooper moved the vent and nurses Mike Mobley and Lauren Roman assisted moving patient.

## 2023-09-10 NOTE — ASSESSMENT & PLAN NOTE
Patient initially presented to [facility] 8/23 with hypoxia and encephalopathy. Acute change in GCS prompted rapid response and subsequent intubation for airway protection. Of note, pt w/ recent hospitalization to [facility] 8/11-8/17 for encephalopathy with unclear etiology despite extensive workup but Wernicke's encephalopathy was on the differential.    ·  [place] workup 8/11:  · Rapid response and intubation on 8/12 for airway protection, extubated on 8/13  · CT head 8/11: No acute intracranial abnormality. Stable small bilateral subcortical hypoattenuating foci. No associated mass effect. · MRI 8/12: No acute intracranial abnormality. Stable nonspecific enhancement along the anterior floor of the third ventricle/hypothalamus compared to March 2023 study. Findings below:  · Differentials includes chronic toxic metabolic insult (thiamine deficiency), chronic inflammatory/infectious conditions  · Other considerations include Langerhans cell cell histiocytosis, lymphocytic hypophysitis, and neurosyphilis. · Primary CNS lymphoma less likely consideration, given the lack of interval change. Serologic correlation is recommended. Stable cerebral white matter signal abnormality, presumably the sequela of chronic microangiopathic disease given the patient's history  · VEEG 8/12: negative for seizure activity. Possible findings relating to underlying sleep disorder   · LP: Grossly negative overall  · Meningitis/encephalitis panel negative, bacterial/fungal culture and gram negative, lyme and cryptococcal negative  · Paraneoplastic panel neg  · Unable to obtain CSF VDRL  · DSPO: Treated with high dose thiamine. Psych evaluated, possible bipolar disorder. Discharged to home with his sister with visiting nurses, speech therapy, PT/OT  · [facility] ([place] 8/23: Presented with hypoxia found by visiting nurse SPO2 in 80's  · 8/24: Patient's girlfriend visited [employer] and found him unresponsive.  Notes said that 'eyes were rolled back with secretions around his mouth"  · Rapid response called and intubated for hypoxia and low GCS. VBG Post intubation 7.25/76/59/33  · Transferred to [facility] on 8/24  · [facility] 8/24 (Current hospital course)  · CT head 8/24: No acute intracranial abnormality  · 8/25 EEG: No evidence of electrographic seizures. Mild background slowing suggestive of underlying cerebral dysfunction from toxic/metablic/infectious/hypoxic encephalopathy. Clinical correlation is recommended  · On neurological exam: wakes to voice, follows 2 step commands   · Continues to be weak off sedation    Plan:  · Mentation now improved but remains with significant generalized weakness   · Neurology consulted, recommendations as follows:  · Somnolence has been going on for a while, some neuro symptoms after chemo in march. · Ach binding and blocking Ab negative   · MUSK antibody and ACHR modulating Ab pending   · Serum anti-TPO and DARIN Ab's, RPR, neg. CSF paraneoplastic panel neg  · Varicella pending  · Toxoplasma antibodies negative  · Autoimmune labs pending   · VGCC serum test pending   · Completed 7 days of Ceftriaxone on 9/7 to cover for possible neurosyphillis  · Repeat LP 9/6: gram stain negative, HSV negative  · Pending: VDRL, VZV PCR, ACE. · Culture: no growth  · 9/7 started IVIg x 5 days and 9/8 pulse dose steroids x 3 days per neuro recommendations for possible autoimmune component to neuro symptoms   · MRI 9/9: No acute intracranial pathology. Unremarkable MRI of the orbits. Improving enhancement of the hypothalamus. 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   · Continue thiamine  · CAM ICU  · Sleep hygiene  · Neurochecks per routine

## 2023-09-10 NOTE — ASSESSMENT & PLAN NOTE
Patient presented with hypoxia 8/23, found to have right lower lobe segmental PE. Initially saturating well on nasal cannula. Rapid response was called 8/24 secondary to hypoxia and altered mental status requiring emergent intubation. Hypoxic post intubation requiring high ventilator settings. · CTA PE study 8/23: Pulmonary embolism in the right lower lobe segmental pulmonary artery. Measured RV/LV ratio is within normal limits at less than 0.9.  · Initial CXR post intubation: Near complete opacity of the left lung likely related to worsening infiltrate and/or atelectasis. Patchy right upper lobe infiltrates. · Strep pneumo/legionella urine antigen negative  · COVID/Flu/RSV negative  · 8/25: Bronchoscopy: poorly tolerated secondary to hypoxia. Some mucus plugging present on the left. · 8/26: CT chest: Complete right lower lobe and near complete left lower lobe atelectasis. Pneumonia not excluded in the appropriate clinical setting. Mild interstitial edema with trace effusions  · Received diuresis with IV Lasix   · CXR 9/5: Improved aeration of the bilateral lungs with mild bibasilar atelectasis. · Completed Zosyn x 7 days (8/25-8/31) - MRSA nares neg  · Tolerates pressure support during the day, but still requires full vent support HS secondary to tachypnea/secretions. Plan:  · Current vent settings: AC/VC 14/500/40/6, PS 12/6 40% during the day  · Day 18 on Vent  · Continues to be weak.  Unclear if etiology is neurologic/neuromuscular vs psychological (?conversion disorder)  · NIF -8 to -14  · Trach/PEG placed 9/8  · Wean Fio2 for goal Sp02 >90  · Continue pulmonary hygiene   · VAP ppx; chlorhexidine, PPI, HOB greater than 30 degrees

## 2023-09-10 NOTE — ASSESSMENT & PLAN NOTE
Previous echocardiogram 6/2/23 showed EF 05%, normal diastolic dysfunction, mild left ventricular hypertrophy, mild RVSP elevation and mild TR  · PTA Med: Torsemide 20 mg daily  · Evaluated by Heart Failure team in July 2023.  LVH thought to be secondary to hypertension, obesity, hx of meth use  · Recommended sleep study at that time   · Echo this admission shows normal EF of 60%, normal LV thickness, no diastolic dysfunction

## 2023-09-10 NOTE — PROGRESS NOTES
Patient is postop day #2 status post tracheostomy and PEG tube. Patient is tolerating his tube feeds. Examination of the PEG tube site reveals the tube to be sited properly with no drainage at the site.

## 2023-09-11 LAB
ANION GAP SERPL CALCULATED.3IONS-SCNC: 4 MMOL/L
APTT PPP: 23 SECONDS (ref 23–37)
ATRIAL RATE: 54 BPM
BUN SERPL-MCNC: 24 MG/DL (ref 5–25)
CALCIUM SERPL-MCNC: 9 MG/DL (ref 8.4–10.2)
CHLORIDE SERPL-SCNC: 113 MMOL/L (ref 96–108)
CO2 SERPL-SCNC: 27 MMOL/L (ref 21–32)
CREAT SERPL-MCNC: 0.69 MG/DL (ref 0.6–1.3)
FUNGUS SPEC CULT: NORMAL
GFR SERPL CREATININE-BSD FRML MDRD: 122 ML/MIN/1.73SQ M
GLUCOSE SERPL-MCNC: 104 MG/DL (ref 65–140)
GLUCOSE SERPL-MCNC: 116 MG/DL (ref 65–140)
GLUCOSE SERPL-MCNC: 119 MG/DL (ref 65–140)
GLUCOSE SERPL-MCNC: 120 MG/DL (ref 65–140)
GLUCOSE SERPL-MCNC: 99 MG/DL (ref 65–140)
MAGNESIUM SERPL-MCNC: 2.4 MG/DL (ref 1.9–2.7)
P AXIS: 86 DEGREES
PHOSPHATE SERPL-MCNC: 2.7 MG/DL (ref 2.7–4.5)
POTASSIUM SERPL-SCNC: 3.4 MMOL/L (ref 3.5–5.3)
PR INTERVAL: 162 MS
QRS AXIS: 96 DEGREES
QRSD INTERVAL: 108 MS
QT INTERVAL: 478 MS
QTC INTERVAL: 453 MS
REAGIN AB CSF QL: NON REACTIVE
SODIUM SERPL-SCNC: 144 MMOL/L (ref 135–147)
T WAVE AXIS: 111 DEGREES
VENTRICULAR RATE: 54 BPM

## 2023-09-11 PROCEDURE — 94640 AIRWAY INHALATION TREATMENT: CPT

## 2023-09-11 PROCEDURE — 94760 N-INVAS EAR/PLS OXIMETRY 1: CPT

## 2023-09-11 PROCEDURE — 94669 MECHANICAL CHEST WALL OSCILL: CPT

## 2023-09-11 PROCEDURE — 84100 ASSAY OF PHOSPHORUS: CPT | Performed by: ANESTHESIOLOGY

## 2023-09-11 PROCEDURE — 99232 SBSQ HOSP IP/OBS MODERATE 35: CPT | Performed by: PHYSICIAN ASSISTANT

## 2023-09-11 PROCEDURE — 83735 ASSAY OF MAGNESIUM: CPT | Performed by: ANESTHESIOLOGY

## 2023-09-11 PROCEDURE — 94003 VENT MGMT INPAT SUBQ DAY: CPT

## 2023-09-11 PROCEDURE — 97110 THERAPEUTIC EXERCISES: CPT

## 2023-09-11 PROCEDURE — 94668 MNPJ CHEST WALL SBSQ: CPT

## 2023-09-11 PROCEDURE — 99291 CRITICAL CARE FIRST HOUR: CPT | Performed by: STUDENT IN AN ORGANIZED HEALTH CARE EDUCATION/TRAINING PROGRAM

## 2023-09-11 PROCEDURE — 82948 REAGENT STRIP/BLOOD GLUCOSE: CPT

## 2023-09-11 PROCEDURE — NC001 PR NO CHARGE: Performed by: STUDENT IN AN ORGANIZED HEALTH CARE EDUCATION/TRAINING PROGRAM

## 2023-09-11 PROCEDURE — 94150 VITAL CAPACITY TEST: CPT

## 2023-09-11 PROCEDURE — 80048 BASIC METABOLIC PNL TOTAL CA: CPT | Performed by: ANESTHESIOLOGY

## 2023-09-11 PROCEDURE — 85730 THROMBOPLASTIN TIME PARTIAL: CPT | Performed by: ANESTHESIOLOGY

## 2023-09-11 PROCEDURE — 93010 ELECTROCARDIOGRAM REPORT: CPT | Performed by: INTERNAL MEDICINE

## 2023-09-11 RX ORDER — POTASSIUM CHLORIDE 20MEQ/15ML
40 LIQUID (ML) ORAL ONCE
Status: COMPLETED | OUTPATIENT
Start: 2023-09-11 | End: 2023-09-11

## 2023-09-11 RX ORDER — MELATONIN
1000 DAILY
Status: DISCONTINUED | OUTPATIENT
Start: 2023-09-12 | End: 2023-09-23 | Stop reason: HOSPADM

## 2023-09-11 RX ORDER — POTASSIUM CHLORIDE 20MEQ/15ML
20 LIQUID (ML) ORAL ONCE
Status: COMPLETED | OUTPATIENT
Start: 2023-09-11 | End: 2023-09-11

## 2023-09-11 RX ADMIN — ALBUTEROL SULFATE 2.5 MG: 2.5 SOLUTION RESPIRATORY (INHALATION) at 13:36

## 2023-09-11 RX ADMIN — FLUOXETINE 10 MG: 10 CAPSULE ORAL at 08:42

## 2023-09-11 RX ADMIN — Medication 20 MG: at 08:44

## 2023-09-11 RX ADMIN — POTASSIUM CHLORIDE 40 MEQ: 1.5 SOLUTION ORAL at 07:31

## 2023-09-11 RX ADMIN — Medication 42.5 G: at 15:04

## 2023-09-11 RX ADMIN — THIAMINE HCL TAB 100 MG 100 MG: 100 TAB at 08:41

## 2023-09-11 RX ADMIN — CHLORHEXIDINE GLUCONATE 15 ML: 1.2 RINSE ORAL at 08:41

## 2023-09-11 RX ADMIN — SODIUM CHLORIDE SOLN NEBU 3% 4 ML: 3 NEBU SOLN at 13:48

## 2023-09-11 RX ADMIN — APIXABAN 5 MG: 5 TABLET, FILM COATED ORAL at 08:41

## 2023-09-11 RX ADMIN — SODIUM CHLORIDE SOLN NEBU 3% 4 ML: 3 NEBU SOLN at 07:29

## 2023-09-11 RX ADMIN — ALBUTEROL SULFATE 2.5 MG: 2.5 SOLUTION RESPIRATORY (INHALATION) at 19:10

## 2023-09-11 RX ADMIN — POTASSIUM CHLORIDE 20 MEQ: 1.5 SOLUTION ORAL at 11:26

## 2023-09-11 RX ADMIN — ALBUTEROL SULFATE 2.5 MG: 2.5 SOLUTION RESPIRATORY (INHALATION) at 07:13

## 2023-09-11 RX ADMIN — APIXABAN 5 MG: 5 TABLET, FILM COATED ORAL at 17:21

## 2023-09-11 RX ADMIN — ACETAMINOPHEN 650 MG: 650 SUSPENSION ORAL at 14:31

## 2023-09-11 RX ADMIN — SODIUM CHLORIDE SOLN NEBU 3% 4 ML: 3 NEBU SOLN at 01:15

## 2023-09-11 RX ADMIN — SODIUM CHLORIDE SOLN NEBU 3% 4 ML: 3 NEBU SOLN at 19:21

## 2023-09-11 RX ADMIN — ALBUTEROL SULFATE 2.5 MG: 2.5 SOLUTION RESPIRATORY (INHALATION) at 01:15

## 2023-09-11 RX ADMIN — CHLORHEXIDINE GLUCONATE 15 ML: 1.2 RINSE ORAL at 20:56

## 2023-09-11 RX ADMIN — METOCLOPRAMIDE 10 MG: 5 INJECTION, SOLUTION INTRAMUSCULAR; INTRAVENOUS at 14:31

## 2023-09-11 RX ADMIN — DIPHENHYDRAMINE HYDROCHLORIDE 25 MG: 50 INJECTION, SOLUTION INTRAMUSCULAR; INTRAVENOUS at 14:31

## 2023-09-11 NOTE — ASSESSMENT & PLAN NOTE
Previous echocardiogram 6/2/23 showed EF 47%, normal diastolic dysfunction, mild left ventricular hypertrophy, mild RVSP elevation and mild TR  · PTA Med: Torsemide 20 mg daily  · Evaluated by Heart Failure team in July 2023.  LVH thought to be secondary to hypertension, obesity, hx of meth use  · Recommended sleep study at that time   · Echo this admission shows normal EF of 60%, normal LV thickness, no diastolic dysfunction

## 2023-09-11 NOTE — ASSESSMENT & PLAN NOTE
Patient initially presented to [facility] on 8/23 with hypoxia and encephalopathy. Acute change in GCS prompted rapid response and subsequent intubation for airway protection. Of note, pt w/ recent hospitalization to [facility] [place] 8/11-8/17 for encephalopathy with unclear etiology despite extensive workup but Wernicke's encephalopathy was on the differential.    ·  Blakely workup 8/11:  · Rapid response and intubation on 8/12 for airway protection, extubated on 8/13  · CT head 8/11: No acute intracranial abnormality. Stable small bilateral subcortical hypoattenuating foci. No associated mass effect. · MRI 8/12: No acute intracranial abnormality. Stable nonspecific enhancement along the anterior floor of the third ventricle/hypothalamus compared to March 2023 study. Findings below:  · Differentials includes chronic toxic metabolic insult (thiamine deficiency), chronic inflammatory/infectious conditions  · Other considerations include Langerhans cell cell histiocytosis, lymphocytic hypophysitis, and neurosyphilis. · Primary CNS lymphoma less likely consideration, given the lack of interval change. Serologic correlation is recommended. Stable cerebral white matter signal abnormality, presumably the sequela of chronic microangiopathic disease given the patient's history  · VEEG 8/12: negative for seizure activity. Possible findings relating to underlying sleep disorder   · LP: Grossly negative overall  · Meningitis/encephalitis panel negative, bacterial/fungal culture and gram negative, lyme and cryptococcal negative  · Paraneoplastic panel neg  · Unable to obtain CSF VDRL  · DSPO: Treated with high dose thiamine. Psych evaluated, possible bipolar disorder. Discharged to home with his sister with visiting nurses, speech therapy, PT/OT  · [facility] 8/23: Presented with hypoxia found by visiting nurse SPO2 in 80's  · 8/24: Patient's girlfriend visited [facility] and found him unresponsive.  Notes said that 'eyes were rolled back with secretions around his mouth"  · Rapid response called and intubated for hypoxia and low GCS. VBG Post intubation 7.25/76/59/33  · Transferred to [address] [place] [address] on 8/24  · [facility] 8/24 (Current hospital course)  · CT head 8/24: No acute intracranial abnormality  · 8/25 EEG: No evidence of electrographic seizures. Mild background slowing suggestive of underlying cerebral dysfunction from toxic/metablic/infectious/hypoxic encephalopathy. Clinical correlation is recommended  · On neurological exam: wakes to voice, follows 2 step commands   · Continues to be weak off sedation    Plan:  · Mentation now improved but remains with significant generalized weakness   · Neurology consulted, recommendations as follows:  · Somnolence has been going on for a while, some neuro symptoms after chemo in march. · Ach binding and blocking Ab negative   · MUSK Ab and ACHR modulating Ab neg  · Serum anti-TPO and DARIN Ab's, RPR, neg. CSF paraneoplastic panel neg  · Varicella PCR neg  · Toxoplasma antibodies negative  · Autoimmune labs pending   · VGCC serum test pending   · Completed 7 days of Ceftriaxone on 9/7 to cover for possible neurosyphillis  · Repeat LP 9/6: gram stain negative, HSV negative, VZV PCR neg  · Pending: VDRL, ACE. · Culture: No growth  · 9/7 started IVIg x 5 days and completed 9/8 pulse dose steroids x 3 days per neuro recommendations for possible autoimmune component to neuro symptoms   · MRI 9/9: No acute intracranial pathology. Unremarkable MRI of the orbits. Improving enhancement of the hypothalamus. 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   · Continue thiamine  · CAM ICU  · Sleep hygiene  · Neurochecks per routine

## 2023-09-11 NOTE — RESPIRATORY THERAPY NOTE
Received patient on AC/VC 14/500/40% +6 tolerating therapy. Switched him over to PS 12/6 40% tolerating therapy. Bed therapy performed and breathing treatment given.

## 2023-09-11 NOTE — PROGRESS NOTES
Progress Note - General Surgery   Enolia Pae 41 y.o. male MRN: [id number]  Unit/Bed#: ICU 01 Encounter: [id number]    Assessment:  1) POD #3 s/p tracheostomy tube and PEG tube placement - ventilating well on tracheostomy tube, trach stitches are still in place, on 40% FiO2, tolerating tube feeds and advancement of tube feeds, no significant drainage around trach site    Plan:  1)   -Continue mechanical ventilation and intubation per the critical care team  -Can be recontacted depending on length of admission to remove tracheostomy stitches later in course  -Removed gauze this morning and clean trach site  -Continue PEG tube placement with Jevity 1.2 Scott Scott and advance towards goals of nutritional management team  -Nutrition consult to be obtained if not already completed     Subjective/Objective   Chief Complaint: Intubated    Subjective: patient was seen examined at bedside. Patient seems more lucid today. Patient did not seem to have any current complaints. Patient is receiving Jevity 1.2 Scott Scott without any issues. Patient is intubated and not able to phonate. Objective:     Blood pressure 122/57, pulse 67, temperature 98.2 °F (36.8 °C), temperature source Temporal, resp. rate (!) 25, height 6' 4" (1.93 m), weight 134 kg (295 lb 13.7 oz), SpO2 98 %. ,Body mass index is 36.01 kg/m².       Intake/Output Summary (Last 24 hours) at 9/11/2023 1212  Last data filed at 9/11/2023 0600  Gross per 24 hour   Intake 2007.43 ml   Output --   Net 2007.43 ml       Invasive Devices     Central Venous Catheter Line  Duration           Port A Cath Right -- days          Peripheral Intravenous Line  Duration           Long-Dwell Peripheral IV (Midline) 08/23/23 Left Brachial 18 days    Peripheral IV 09/09/23 Right Antecubital 1 day          Drain  Duration           Gastrostomy/Enterostomy PEG- Jejunostomy 20 Fr. LUQ 2 days          Airway  Duration           Surgical Airway Shiley Cuffed 2 days                Physical Exam: /57   Pulse 67   Temp 98.2 °F (36.8 °C) (Temporal)   Resp (!) 25   Ht 6' 4" (1.93 m)   Wt 134 kg (295 lb 13.7 oz)   SpO2 98%   BMI 36.01 kg/m²   General appearance: alert  Head: Normocephalic, without obvious abnormality, atraumatic  Lungs: clear to auscultation bilaterally and mechanical ventilation sounds  Heart: regular rate and rhythm, S1, S2 normal, no murmur, click, rub or gallop  Abdomen: soft, non-tender; bowel sounds normal; no masses,  no organomegaly  Skin: Skin color, texture, turgor normal. No rashes or lesions    Lab, Imaging and other studies:  I have personally reviewed pertinent lab results.   ,    CMP:   Lab Results   Component Value Date    SODIUM 144 09/11/2023    K 3.4 (L) 09/11/2023     (H) 09/11/2023    CO2 27 09/11/2023    BUN 24 09/11/2023    CREATININE 0.69 09/11/2023    CALCIUM 9.0 09/11/2023    EGFR 122 09/11/2023     VTE Pharmacologic Prophylaxis: Eliquis  VTE Mechanical Prophylaxis: sequential compression device

## 2023-09-11 NOTE — OCCUPATIONAL THERAPY NOTE
OT TREATMENT         09/11/23 1557   OT Last Visit   OT Visit Date 09/11/23   Note Type   Note Type Treatment   Pain Assessment   Pain Assessment Tool 0-10   Pain Score 5   Pain Location/Orientation Location: Neck  (trach area)   Restrictions/Precautions   Other Precautions Chair Alarm; Bed Alarm; Fall Risk;Multiple lines;O2  (trach and PEG)   ADL   Grooming Assistance 4  Minimal Assistance   Grooming Deficit Wash/dry face   Grooming Comments supine with head of bed slightly raised   Transfers   Additional Comments pt OOB to chair this am per nursing   ROM- Right Upper Extremities   R Shoulder Flexion;AAROM   R Elbow AAROM;Elbow flexion;Elbow extension;AROM   R Wrist AROM; Wrist extension;Wrist flexion   R Hand AROM;Long finger; Index finger; Thumb;Ring finger;Little finger   R Weight/Reps/Sets 5 times each supine   ROM - Left Upper Extremities    L Shoulder AAROM; Flexion   L Elbow AROM;Elbow extension;Elbow flexion   L Wrist AROM; Wrist flexion;Wrist extension   L Hand AROM; Index finger; Thumb; Long finger;Little finger;Ring finger   L Weight/Reps/Sets 10 times each supine   Cognition   Arousal/Participation Alert; Cooperative   Attention Attends with cues to redirect   Following Commands Follows one step commands with increased time or repetition   Assessment   Assessment Patient seen for OT treatment s/p trach and PEG. Patient is cooperative and alert. Patient able to participate in OT session. Fatigued with activity. Patient will benefit from continued OT services to maximize functional performance with ADLS. The patient's raw score on the -PAC Daily Activity Inpatient Short Form is 8. A raw score of less than 19 suggests the patient may benefit from discharge to post-acute rehabilitation services. Please refer to the recommendation of the Occupational Therapist for safe discharge planning. Plan   Treatment Interventions ADL retraining;Functional transfer training;UE strengthening/ROM; Endurance training;Patient/family training;Equipment evaluation/education; Activityengagement; Compensatory technique education   OT Frequency 3-5x/wk   Recommendation   OT Discharge Recommendation Post acute rehabilitation services   AM-PAC Daily Activity Inpatient   Lower Body Dressing 1   Bathing 1   Toileting 1   Upper Body Dressing 1   Grooming 3   Eating 1  (PEG tube)   Daily Activity Raw Score 8   Turning Head Towards Sound 4   Follow Simple Instructions 4   Low Function Daily Activity Raw Score 16   Low Function Daily Activity Standardized Score  27.65   AM-PAC Applied Cognition Inpatient   Following a Speech/Presentation 3   Understanding Ordinary Conversation 4   Taking Medications 3   Remembering Where Things Are Placed or Put Away 2   Remembering List of 4-5 Errands 2   Taking Care of Complicated Tasks 2   Applied Cognition Raw Score 16   Applied Cognition Standardized Score 85.26   Licensure   [place] License Number  [place] Martin [id number] [facility] OTR/L [id number]

## 2023-09-11 NOTE — PROGRESS NOTES
Spiritual Care Progress Note    2023  Patient: Abel Nelson : 1985  Admission Date & Time: 2023 1825  MRN: [id number] CSN: [id number]     visited patient per LOS.  introduced self & role and provided emotional support. Patient communicated through nodding and shaking head. Pt reported feeling better today, no further needs identified at this time. Spiritual care remains available to support.              Chaplaincy Interventions Utilized:   Empowerment: Provided chaplaincy education    Relationship Building: Cultivated a relationship of care and support     23 1300   Clinical Encounter Type   Visited With Patient   Routine Visit Introduction   Crisis Visit Critical Care   Referral From Other (Comment)  (LOS)

## 2023-09-11 NOTE — ASSESSMENT & PLAN NOTE
Presented with hypoxia, initially admitted to the floor on supplemental NC  · CTA PE: Pulmonary embolism in the right lower lobe segmental pulmonary artery. Measured RV/LV ratio is within normal limits at less than 0.9. · BNP 9, initial HS troponin 9  · Initiated on heparin gtt, transitioned to therapeutic Lovenox on 8/26  · Developed worsening hypoxia 8/24, rapid response was called and patient was intubated for airway protection and hypoxic  · Initially requiring high vent setting  · Echo completed 8/25: Left Ventricle: Left ventricular cavity size is normal. Wall thickness is normal. The left ventricular ejection fraction is 60% by visual estimation. . Wall motion is normal. Diastolic function is normal for age. Right Ventricle: Right ventricular cavity size is normal. Systolic function is normal. Normal tricuspid annular plane systolic excursion (TAPSE) > 1.7 cm.     Plan:  · 8/26: Heparin converted to therapeutic lovenox on 8/26  · 9/6: Therapeutic lovenox transitioned to Heparin gtt in lenora-procedural period   · 9/10: Eliquis started  · Dopplers of LE neg for DVT bilaterally

## 2023-09-11 NOTE — ASSESSMENT & PLAN NOTE
Patient presented with hypoxia 8/23, found to have right lower lobe segmental PE. Initially saturating well on nasal cannula. Rapid response was called 8/24 secondary to hypoxia and altered mental status requiring emergent intubation. Hypoxic post intubation requiring high ventilator settings. · CTA PE study 8/23: Pulmonary embolism in the right lower lobe segmental pulmonary artery. Measured RV/LV ratio is within normal limits at less than 0.9.  · Initial CXR post intubation: Near complete opacity of the left lung likely related to worsening infiltrate and/or atelectasis. Patchy right upper lobe infiltrates. · Strep pneumo/legionella urine antigen negative  · COVID/Flu/RSV negative  · 8/25: Bronchoscopy: poorly tolerated secondary to hypoxia. Some mucus plugging present on the left. · 8/26: CT chest: Complete right lower lobe and near complete left lower lobe atelectasis. Pneumonia not excluded in the appropriate clinical setting. Mild interstitial edema with trace effusions  · Received diuresis with IV Lasix   · CXR 9/5: Improved aeration of the bilateral lungs with mild bibasilar atelectasis. · Completed Zosyn x 7 days (8/25-8/31) - MRSA nares neg  · Tolerates pressure support during the day, but still requires full vent support HS secondary to tachypnea/secretions. Plan:  · Current vent settings: AC/VC 14/500/40/6, PS 12/6 40% during the day  · Day 19 on Vent  · Continues to be weak.  Unclear if etiology is neurologic/neuromuscular vs psychological (?conversion disorder)  · NIF -8 to -14  · Trach/PEG POD2 placed 9/8  · Wean Fio2 for goal Sp02 >90  · Continue pulmonary hygiene   · VAP ppx; chlorhexidine, PPI, HOB greater than 30 degrees  · Family to have meeting with ICU team to coordinate disposition plan

## 2023-09-11 NOTE — PROGRESS NOTES
[address]  Progress Note  Name: Ladan Luque  MRN: [id number]  Unit/Bed#: ICU 01 I Date of Admission: 8/24/2023   Date of Service: 9/11/2023 I Hospital Day: 25    Assessment/Plan   * Acute respiratory failure with hypoxia [facility])  Assessment & Plan  Patient presented with hypoxia 8/23, found to have right lower lobe segmental PE. Initially saturating well on nasal cannula. Rapid response was called 8/24 secondary to hypoxia and altered mental status requiring emergent intubation. Hypoxic post intubation requiring high ventilator settings. · CTA PE study 8/23: Pulmonary embolism in the right lower lobe segmental pulmonary artery. Measured RV/LV ratio is within normal limits at less than 0.9.  · Initial CXR post intubation: Near complete opacity of the left lung likely related to worsening infiltrate and/or atelectasis. Patchy right upper lobe infiltrates. · Strep pneumo/legionella urine antigen negative  · COVID/Flu/RSV negative  · 8/25: Bronchoscopy: poorly tolerated secondary to hypoxia. Some mucus plugging present on the left. · 8/26: CT chest: Complete right lower lobe and near complete left lower lobe atelectasis. Pneumonia not excluded in the appropriate clinical setting. Mild interstitial edema with trace effusions  · Received diuresis with IV Lasix   · CXR 9/5: Improved aeration of the bilateral lungs with mild bibasilar atelectasis. · Completed Zosyn x 7 days (8/25-8/31) - MRSA nares neg  · Tolerates pressure support during the day, but still requires full vent support HS secondary to tachypnea/secretions. Plan:  · Current vent settings: AC/VC 14/500/40/6, PS 12/6 40% during the day  · Day 19 on Vent  · Continues to be weak.  Unclear if etiology is neurologic/neuromuscular vs psychological (?conversion disorder)  · NIF -8 to -14  · Trach/PEG POD2 placed 9/8  · Wean Fio2 for goal Sp02 >90  · Continue pulmonary hygiene   · VAP ppx; chlorhexidine, PPI, HOB greater than 30 degrees  · Family to have meeting with ICU team to coordinate disposition plan    Toxic metabolic encephalopathy  Assessment & Plan  Patient initially presented to [facility] on 8/23 with hypoxia and encephalopathy. Acute change in GCS prompted rapid response and subsequent intubation for airway protection. Of note, pt w/ recent hospitalization to [facility] 8/11-8/17 for encephalopathy with unclear etiology despite extensive workup but Wernicke's encephalopathy was on the differential.    ·  [place] workup 8/11:  · Rapid response and intubation on 8/12 for airway protection, extubated on 8/13  · CT head 8/11: No acute intracranial abnormality. Stable small bilateral subcortical hypoattenuating foci. No associated mass effect. · MRI 8/12: No acute intracranial abnormality. Stable nonspecific enhancement along the anterior floor of the third ventricle/hypothalamus compared to March 2023 study. Findings below:  · Differentials includes chronic toxic metabolic insult (thiamine deficiency), chronic inflammatory/infectious conditions  · Other considerations include Langerhans cell cell histiocytosis, lymphocytic hypophysitis, and neurosyphilis. · Primary CNS lymphoma less likely consideration, given the lack of interval change. Serologic correlation is recommended. Stable cerebral white matter signal abnormality, presumably the sequela of chronic microangiopathic disease given the patient's history  · VEEG 8/12: negative for seizure activity. Possible findings relating to underlying sleep disorder   · LP: Grossly negative overall  · Meningitis/encephalitis panel negative, bacterial/fungal culture and gram negative, lyme and cryptococcal negative  · Paraneoplastic panel neg  · Unable to obtain CSF VDRL  · DSPO: Treated with high dose thiamine. Psych evaluated, possible bipolar disorder.  Discharged to home with his sister with visiting nurses, speech therapy, PT/OT  ·  [place] 8/23: Presented with hypoxia found by visiting nurse SPO2 in 80's  · 8/24: Patient's girlfriend visited [address] and found him unresponsive. Notes said that 'eyes were rolled back with secretions around his mouth"  · Rapid response called and intubated for hypoxia and low GCS. VBG Post intubation 7.25/76/59/33  · Transferred to [facility] on 8/24  · ASHLEY CONKLIN [facility] 8/24 (Current hospital course)  · CT head 8/24: No acute intracranial abnormality  · 8/25 EEG: No evidence of electrographic seizures. Mild background slowing suggestive of underlying cerebral dysfunction from toxic/metablic/infectious/hypoxic encephalopathy. Clinical correlation is recommended  · On neurological exam: wakes to voice, follows 2 step commands   · Continues to be weak off sedation    Plan:  · Mentation now improved but remains with significant generalized weakness   · Neurology consulted, recommendations as follows:  · Somnolence has been going on for a while, some neuro symptoms after chemo in march. · Ach binding and blocking Ab negative   · MUSK Ab and ACHR modulating Ab neg  · Serum anti-TPO and DARIN Ab's, RPR, neg. CSF paraneoplastic panel neg  · Varicella PCR neg  · Toxoplasma antibodies negative  · Autoimmune labs pending   · VGCC serum test pending   · Completed 7 days of Ceftriaxone on 9/7 to cover for possible neurosyphillis  · Repeat LP 9/6: gram stain negative, HSV negative, VZV PCR neg  · Pending: VDRL, ACE. · Culture: No growth  · 9/7 started IVIg x 5 days and completed 9/8 pulse dose steroids x 3 days per neuro recommendations for possible autoimmune component to neuro symptoms   · MRI 9/9: No acute intracranial pathology. Unremarkable MRI of the orbits. Improving enhancement of the hypothalamus. 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   · Continue thiamine  · CAM ICU  · Sleep hygiene  · Neurochecks per routine     Acute pulmonary embolism without acute cor pulmonale (HCC)  Assessment & Plan  Presented with hypoxia, initially admitted to the floor on supplemental NC  · CTA PE: Pulmonary embolism in the right lower lobe segmental pulmonary artery. Measured RV/LV ratio is within normal limits at less than 0.9. · BNP 9, initial HS troponin 9  · Initiated on heparin gtt, transitioned to therapeutic Lovenox on 8/26  · Developed worsening hypoxia 8/24, rapid response was called and patient was intubated for airway protection and hypoxic  · Initially requiring high vent setting  · Echo completed 8/25: Left Ventricle: Left ventricular cavity size is normal. Wall thickness is normal. The left ventricular ejection fraction is 60% by visual estimation. . Wall motion is normal. Diastolic function is normal for age. Right Ventricle: Right ventricular cavity size is normal. Systolic function is normal. Normal tricuspid annular plane systolic excursion (TAPSE) > 1.7 cm. Plan:  · 8/26: Heparin converted to therapeutic lovenox on 8/26  · 9/6: Therapeutic lovenox transitioned to Heparin gtt in lenora-procedural period   · 9/10: Eliquis started  · Dopplers of LE neg for DVT bilaterally    History of LVH (left ventricular hypertrophy)  Assessment & Plan  Previous echocardiogram 6/2/23 showed EF 04%, normal diastolic dysfunction, mild left ventricular hypertrophy, mild RVSP elevation and mild TR  · PTA Med: Torsemide 20 mg daily  · Evaluated by Heart Failure team in July 2023.  LVH thought to be secondary to hypertension, obesity, hx of meth use  · Recommended sleep study at that time   · Echo this admission shows normal EF of 60%, normal LV thickness, no diastolic dysfunction    Depression  Assessment & Plan  · Continue Fluoxetine 10 mg daily  · Evaluated by psychiatry last admission, felt to have depressive mood disorder  · Possibly contributing to weakness on vent despite being off sedation      Seminoma of left testis [facility])  Assessment & Plan  · Testicular cancer s/p left orchiectomy on 12/2022  · Repeat CT scan in January 2023 with enlarging lymph node.  · Began Chemo in March 2023: Etoposide and Cisplatin with plan for 4 cycles, 5 days every 21 days  · Did not complete chemo treatment due to adverse effects  · Stopped Cisplatin after 1 cycle due to painful neuropathy  · Continued Etoposide 2nd cycle  · Resumed Cisplatin and Etoposide 3rd cycle  · After 3rd cycle reported double vision, labile affect and right sided weakness and therefore chemotherapy was stopped  · Stopped chemo on 5/26/23  · MRI demonstrated white matter changes at that time and he was referred to Neurology   · Continue outpatient follow up with Heme/Onc     Hypertension  Assessment & Plan  Hx of hypertension. PTA was on Torsemide 20 mg daily  · Echo this admission showed EF 60%, normal LV thickness, no systolic or diastolic dysfunction  · Hypotension s/p intubation initially requiring Levophed DC'ed on 8/27  · Patient has been normotensive    Umbilical hernia without obstruction and without gangrene  Assessment & Plan  · Umbilical hernia present, easily reducible          ICU Core Measures     Vented Patient  VAP Bundle  VAP bundle ordered     A: Assess, Prevent, and Manage Pain · Has pain been assessed? Yes  · Need for changes to pain regimen? No   B: Both Spontaneous Awakening Trials (SATs) and Spontaneous Breathing Trials (SBTs) · Plan to perform spontaneous awakening trial today? Yes   · Plan to perform spontaneous breathing trial today? Yes   · Obvious barriers to extubation? Yes   C: Choice of Sedation · RASS Goal: 0 Alert and Calm  · Need for changes to sedation or analgesia regimen? No   D: Delirium · CAM-ICU: Unable to perform secondary to Acute cognitive dysfunction   E: Early Mobility  · Plan for early mobility? Yes   F: Family Engagement · Plan for family engagement today?  Yes       Review of Invasive Devices:        Prophylaxis:  VTE VTE covered by:  apixaban, Oral, 5 mg at 09/10/23 0688  heparin (porcine), Intravenous  heparin (porcine), Intravenous       Stress Ulcer covered byomeprazole (PRILOSEC) suspension 2 mg/mL [[id number]]       Subjective   HPI/24hr events:   Patient continues to require full settings, not able to tolerate pressure support for long periods of time, or weaning on P/S lower than 12/6. Doing well POD2 Trach/PEG. V/S with tachypnea and mild hypothermia. Labs stable, electrolytes repleted. Review of Systems   Unable to perform ROS: Acuity of condition          Objective                            Vitals I/O      Most Recent Min/Max in 24hrs   Temp 98.2 °F (36.8 °C) Temp  Min: 96.1 °F (35.6 °C)  Max: 98.2 °F (36.8 °C)   Pulse 67 Pulse  Min: 48  Max: 69   Resp (!) 25 Resp  Min: 19  Max: 29   /57 BP  Min: 98/64  Max: 126/76   O2 Sat 97 % SpO2  Min: 93 %  Max: 100 %      Intake/Output Summary (Last 24 hours) at 9/11/2023 0801  Last data filed at 9/11/2023 0600  Gross per 24 hour   Intake 2727.43 ml   Output --   Net 2727.43 ml         Diet Enteral/Parenteral; Tube Feeding No Oral Diet; Jevity 1.2 Scott; Continuous; 70; Prosource Protein Liquid - One Packet Daily; 200     Invasive Monitoring Physical exam    Physical Exam  Eyes:      Extraocular Movements: Extraocular movements intact. Conjunctiva/sclera: Conjunctivae normal.   Skin:     General: Skin is warm and dry. HENT:      Mouth/Throat:      Mouth: Mucous membranes are moist.   Cardiovascular:      Rate and Rhythm: Normal rate and regular rhythm. Heart sounds: Normal heart sounds. Musculoskeletal:      Right lower leg: No edema. Left lower leg: No edema. Abdominal:      General: Bowel sounds are normal.      Palpations: Abdomen is soft. Hernia: A hernia is present. Comments: PEG tube present   Constitutional:       General: He is not in acute distress. Appearance: He is not toxic-appearing. Pulmonary:      Effort: No respiratory distress. Breath sounds: Normal breath sounds. Comments: Trach tube present  Neurological:      Mental Status: He is alert.  He is calm.      Comments: Decreased strength of bilateral UE and LE. Slow weak movements.  strength symmetric   Vitals reviewed. Diagnostic Studies      Imaging:  I have personally reviewed pertinent reports.    and I have personally reviewed pertinent films in PACS     Medications:  Scheduled PRN   acetaminophen, 650 mg, Daily  albuterol, 2.5 mg, Q6H  apixaban, 5 mg, BID  chlorhexidine, 15 mL, Q12H ISAEL  cholecalciferol, 1,000 Units, Daily  diphenhydrAMINE, 25 mg, Daily  FLUoxetine, 10 mg, Daily  immune globulin, human, 42.5 g, Q24H  insulin lispro, 2-12 Units, Q6H ISAEL  metoclopramide, 10 mg, Daily  omeprazole (PRILOSEC) suspension 2 mg/mL, 20 mg, Daily  sodium chloride, 4 mL, Q6H  thiamine, 100 mg, Daily      acetaminophen, 650 mg, Q4H PRN  fentanyl citrate (PF), 50 mcg, Q1H PRN  heparin (porcine), 10,000 Units, Q6H PRN  heparin (porcine), 5,000 Units, Q6H PRN  polyethylene glycol, 17 g, Daily PRN  senna, 17.6 mg, Daily PRN       Continuous          Labs:    CBC    No recent results  BMP    Recent Labs     09/11/23  0532   SODIUM 144   K 3.4*   *   CO2 27   AGAP 4   BUN 24   CREATININE 0.69   CALCIUM 9.0       Coags    Recent Labs     09/10/23  1151 09/11/23  0532   PTT 85* 23        Additional Electrolytes  Recent Labs     09/11/23  0532   MG 2.4   PHOS 2.7          Blood Gas    No recent results  No recent results LFTs  No recent results    Infectious  No recent results  Glucose  Recent Labs     09/11/23  0532   GLUC 119             Richi Deluca MD

## 2023-09-11 NOTE — PROGRESS NOTES
Critical Care Attending Note; Viviane Scherer   Note Date: 23  Note Time: 9:59 AM    Patient: Merline Kand  Age, : 41 y.o., 1985 MRN: [id number] Code Status: Level 1 - Full Code Patient Location: ICU 01/ICU    Hospital LOS:18 days  ]   Patient seen and examined, medical record reviewed, discussed with house staff and nursing staff. HPI   CC: AMS  38M with a PMH of Met L Testicular Seminoma(s/p orchiectomy/chemotherapy), HFpEF, HTN, Obesity, OHS, remote meth/EtOH abuse and depression and recent admission  for AMS(Concerning for Wernickes vs Narcolepsy) who represents with AMS, weakness,  hypoxia reqruiring intubation    Key Recent Events   : Admitted, Intubated  : Trach/PEG, Pulse dose steroids, IVIG     Main ICU Plans:       #Neuro  Weakness  - Unclear Etiology - Negative workup so far(- Ach, Normal CSF protein, paraneoplastic panel) - completing IVIG - May benefit from EMG or muscle biopsy   - IVIG  - Completed Pulse dose steroid   - Neurology Consulted - Appreciate recs  - Follow up rheumatic workup, CSF  - Add on hypocretin level to CSF    - NIF Daily    #Card  Pulmonary Embolism   - Eloquis    #Pulm  Acute Respiratory Failure  - TCTs   - Wean FiO2 - Maintain O2 Sat >90%  - Trach Care/Vap Bundle      #Endo  Hyperglycemic S/t Steroid   - ISS      #DVT/GI ppx  -Apixban    #Lines/Tubes/Drains:   Peripheral IV 23 Right Antecubital (Active)   Number of days: 2       #Nutrition:   Diet Enteral/Parenteral; Tube Feeding No Oral Diet; Jevity 1.2 Scott; Continuous; 70; Prosource Protein Liquid - One Packet Daily; 200        #Code Status:   Level 1 - Full Code    #Dispo:   Rae Heck MD  Pulmonary, Critical Care    Critical care time, excluding procedures, teaching, family meetings, and excludes any prior time recorded by the AP/resident, 35 minutes.  Upon my evaluation, this patient has a high probability of imminent or life-threatening deterioration due to above problems which required my direct attention, intervention, and personal management. Impression/Active Problems:    Testicular Seminoma   HFpEF   HTN   Obesity   OHS   Wernickes encephalopathy   Myopathy      Physical Exam:     Vital Signs:   Weight: 134 kg (295 lb 13.7 oz)  IBW: Ideal body weight: 86.8 kg (191 lb 5.7 oz)  Adjusted ideal body weight: 106 kg (233 lb 2.5 oz)  Temp:  [96.1 °F (35.6 °C)-98.2 °F (36.8 °C)] 98.2 °F (36.8 °C)  HR:  [48-69] 67  Resp:  [19-29] 25  BP: ()/(57-84) 122/57  FiO2 (%):  [40] 40  General: NAD  Neuro: Alert, follows commands  Heart: RRR  Lungs: Basilar crackles  Abdomen: Soft NT  Extremities: No Edema                Ventilator Settings:     Vent Mode: CPAP/PS Spont  FiO2 (%):  [40] 40          Invalid input(s): "PCO2", "O2"  Radiologic Images Reviewed:    MRI Brain  No acute intracranial pathology. Unremarkable MRI of the orbits. Improving enhancement of the hypothalamus. 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 CXR  Improved aeration of the bilateral lungs with mild bibasilar atelectasis.      Input / Output:       Intake/Output Summary (Last 24 hours) at 9/11/2023 0959  Last data filed at 9/11/2023 0600  Gross per 24 hour   Intake 2637.43 ml   Output --   Net 2637.43 ml            Infusions:     Scheduled Medications:  Current Facility-Administered Medications   Medication Dose Route Frequency Provider Last Rate   • acetaminophen  650 mg Oral Q4H PRN AMBER Mcknight     • acetaminophen  650 mg Oral Daily Carol Anne MD     • albuterol  2.5 mg Nebulization Q6H AMBER Mcknight     • apixaban  5 mg Oral BID [facility] AMBER ALANIZ     • chlorhexidine  15 mL Mouth/Throat Q12H [facility] AMBER Mcknight     • cholecalciferol  1,000 Units Oral Daily AMBER Venegas     • diphenhydrAMINE  25 mg Intravenous Daily [address] [facility] AMBER ALANIZ     • fentanyl citrate (PF)  50 mcg Intravenous Q1H PRN AMBER Christy     • FLUoxetine  10 mg Oral Daily AMBER Christy     • heparin (porcine)  10,000 Units Intravenous Q6H PRN AMBER Shaver     • heparin (porcine)  5,000 Units Intravenous Q6H PRN AMBER Shaver     • immune globulin, human  42.5 g Intravenous Q24H AMBER Christy Stopped (09/10/23 2475)   • insulin lispro  2-12 Units Subcutaneous Q6H [address] AMBER Christy     • metoclopramide  10 mg Intravenous Daily AMBER Christy     • omeprazole (PRILOSEC) suspension 2 mg/mL  20 mg Oral Daily AMBER Mei     • polyethylene glycol  17 g Oral Daily PRN AMBER Christy     • senna  17.6 mg Oral Daily PRN AMBER Christy     • sodium chloride  4 mL Nebulization Q6H AMBER Christy     • thiamine  100 mg Oral Daily AMBER Christy         PRN Medications:  •  acetaminophen  •  fentanyl citrate (PF)  •  heparin (porcine)  •  heparin (porcine)  •  polyethylene glycol  •  senna    Labs Reviewed:  Results from last 7 days   Lab Units 09/09/23  0606 09/06/23  0506 09/05/23  0600   WBC Thousand/uL 5.57 8.66 8.56   HEMOGLOBIN g/dL 10.8* 11.6* 11.4*   HEMATOCRIT % 34.2* 37.2 35.5*   PLATELETS Thousands/uL 234 255 264      Results from last 7 days   Lab Units 09/11/23  0532 09/09/23  0606 09/06/23  0506   SODIUM mmol/L 144 148* 146   CO2 mmol/L 27 27 29   BUN mg/dL 24 16 15   CALCIUM mg/dL 9.0 9.2 9.3   MAGNESIUM mg/dL 2.4 2.4 2.4   PHOSPHORUS mg/dL 2.7 3.5 3.8         Invalid input(s): "ASTSGOT", "ALTSGPT"LABRCNTIP@ ,alkphos:3,tbilirubin:3,dbilirubin:3)@  Results from last 7 days   Lab Units 09/06/23  0506 09/05/23  0607   INR  1.07 1.06     Invalid input(s): "TROPT", "CPK", "PBNP"             I have personally seen and examined the patient on (09/11/23 between 0737-7577).  I discussed the patient with the AP/resident including, but not limited to, verifying findings; reviewing labs and x-rays; discussing with consultants; developing the plan of care with the bedside nurse; and discussing treatment plan with patient or surrogate. I have reviewed the note and assessment performed by the AP/resident and agree with the AP/resident’s documented findings and plan of care with the above additions/exceptions. Please see my comments for details and adjustments.

## 2023-09-11 NOTE — CASE MANAGEMENT
Case Management Discharge Planning Note    Patient name Enrique Felton  Location ICU /ICU 01 MRN [id number]  : 1985 Date 2023       Current Admission Date: 2023  Current Admission Diagnosis:Acute respiratory failure with hypoxia [facility])   Patient Active Problem List    Diagnosis Date Noted   • Anxiety 2023   • Acute respiratory failure with hypoxia ([address]) 2023   • Acute pulmonary embolism without acute cor pulmonale ([address]) 2023   • History of Wernicke's encephalopathy 2023   • Depression 2023   • Toxic metabolic encephalopathy    • Testicular cancer ([address]) 2023   • History of LVH (left ventricular hypertrophy) 2023   • Pure hypertriglyceridemia 2023   • Encephalopathy 2023   • Unilateral vestibular schwannoma ([address]) 2023   • Port-A-Cath in place 2023   • Diplopia 2023   • 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   • Urinary hesitancy    • Umbilical hernia without obstruction and without gangrene 12/10/2022   • Tobacco use 12/10/2022   • Retroperitoneal lymphadenopathy 12/10/2022   • Hypertension 12/10/2022      LOS (days): 18  Geometric Mean LOS (GMLOS) (days):   Days to GMLOS:     OBJECTIVE:  Risk of Unplanned Readmission Score: 36.88     Current admission status: Inpatient   Preferred Pharmacy:   [address] [place], [place] [address]danny,[address], [address] [place] [address]  Phone: [phone] Fax: [phone]    Primary Care Provider: Wlilard Martell MD    Primary Insurance: [address]  Secondary Insurance:     DISCHARGE DETAILS:    Discharge planning discussed with[de-identified] Clearance mother Cannon  Freedom of Choice: Yes  Comments - Freedom of Choice: SW following to monitor needs and assist with DCP. Case discussed with ICU care team.  Pt remains on vent. Trach/peg inserted 23. Per care team vent rehab can be explored.   SW placed call to pt's mother to review recommendation and discuss options. Mother agreeable with referrals being made to [facility] and vent rehab facilities. Her preference is as close to [facility] as possible. Mother also requested family meeting so care needs and progress can be reviewed with all. Mother requested meeting for Wednesday at 10:00 am.  SW will continue to follow to monitor progress and assist wiht planning as needed.   CM contacted family/caregiver?: Yes  Were Treatment Team discharge recommendations reviewed with patient/caregiver?: Yes  Did patient/caregiver verbalize understanding of patient care needs?: Yes  Were patient/caregiver advised of the risks associated with not following Treatment Team discharge recommendations?: Yes    Contacts  Patient Contacts: Janes Villegas (mother)  Relationship to Patient[de-identified] Family  Contact Method: Phone  Phone Number: [phone]  Reason/Outcome: Discharge [address]         Is the patient interested in [address] [address] [facility] [place] [facility] at discharge?: No    Other Referral/Resources/Interventions Provided:  Interventions: LTACH, Short Term Rehab, Other (Specify) (Vent rehab)  Referral Comments: LTAC and vent rehab referrals initiated    Treatment Team Recommendation: LTACH, Short Term Rehab, Other (vent rehab)  [address]ination Plan[de-identified] [address], Other (vent rehab)  Transport at Discharge : [address] [facility]

## 2023-09-11 NOTE — UTILIZATION REVIEW
Continued Stay Review    Date: 9/10, 9/11                          Current Patient Class: inpatient  Current Level of Care: ICU  HPI:38 y.o. male initially admitted on 8/24/23     Assessment/Plan:   9/10/23:  HPI/24hr events: Repeat MRI completed yesterday; no acute intracranial pathology, improving enhancement of hypothalamus. Tolerated approximately 7 hours of PS then returned to full settings for tachypnea. Tolerating TF via PEG. Heparin drip resumed. 9/11/23:  HPI/24hr events:   Patient continues to require full settings, not able to tolerate pressure support for long periods of time, or weaning on P/S lower than 12/6. Doing well POD2 Trach/PEG. V/S with tachypnea and mild hypothermia. Labs stable, electrolytes repleted.       Vital Signs:   Date/Time Temp Pulse Resp BP MAP (mmHg) SpO2 FiO2 (%) O2 Device Patient Position - Orthostatic VS   09/11/23 1600 97.2 °F (36.2 °C) Abnormal  60 14 105/65 81 98 % -- Ventilator Lying   09/11/23 1534 -- 54 Abnormal  25 Abnormal  101/57 74 97 % 40 Ventilator --   09/11/23 1533 97.1 °F (36.2 °C) Abnormal  55 19 -- -- 98 % -- -- --   09/11/23 1504 -- -- -- -- -- 97 % -- -- --   09/11/23 1453 97.9 °F (36.6 °C) 58 26 Abnormal  95/59 72 98 % -- -- Lying   09/11/23 1350 -- -- -- -- -- 96 % -- -- --   09/11/23 1337 -- -- -- -- -- 96 % -- -- --   09/11/23 1200 97.7 °F (36.5 °C) 58 33 Abnormal  125/77 96 98 % -- -- --   09/11/23 1125 -- -- -- -- -- 98 % -- -- --   09/11/23 0800 97.8 °F (36.6 °C) 57 28 Abnormal  110/64 82 95 % 40 Ventilator --   09/11/23 0730 -- -- -- -- -- 97 % -- -- --   09/11/23 0714 -- 67 25 Abnormal  -- -- 98 % -- -- --   09/11/23 0700 -- 57 28 Abnormal  122/57 82 96 % -- -- --   09/11/23 0600 -- 50 Abnormal  29 Abnormal  125/70 91 95 % -- -- --   09/11/23 0500 -- 50 Abnormal  27 Abnormal  109/71 86 96 % -- -- --   09/11/23 0400 98.2 °F (36.8 °C) 51 Abnormal  20 126/76 97 97 % -- -- --   09/11/23 0300 -- 51 Abnormal  24 Abnormal  116/69 88 95 % -- -- -- 09/11/23 0200 -- 69 24 Abnormal  109/60 79 95 % -- -- --   09/11/23 0100 -- 54 Abnormal  22 118/69 90 96 % -- -- --   09/11/23 0000 97.3 °F (36.3 °C) Abnormal  52 Abnormal  24 Abnormal  123/78 96 97 % -- -- --   09/10/23 2300 -- 48 Abnormal  20 113/71 88 95 % -- -- --   09/10/23 2200 -- 58 25 Abnormal  125/74 95 96 % -- -- --   09/10/23 2100 -- 64 27 Abnormal  106/66 82 94 % -- -- --   09/10/23 2000 -- 52 Abnormal  19 101/57 73 93 % -- -- --   09/10/23 1900 96.7 °F (35.9 °C) Abnormal  48 Abnormal  19 101/58 74 96 % -- -- --   09/10/23 1752 -- -- -- -- -- 95 % -- -- --   09/10/23 1736 96.8 °F (36 °C) Abnormal  51 Abnormal  26 Abnormal  104/68 82 96 % -- -- Lying   09/10/23 1716 -- 50 Abnormal  19 100/64 78 95 % -- -- --   09/10/23 1700 96.4 °F (35.8 °C) Abnormal  51 Abnormal  20 98/64 75 95 % -- -- Lying   09/10/23 1600 96.1 °F (35.6 °C) Abnormal  58 19 103/63 76 94 % 40 Ventilator Lying       Diet Enteral/Parenteral; Tube Feeding No Oral Diet; Jevity 1.2 Scott; Continuous; 70; Prosource Protein Liquid - One Packet Daily; 200     Pertinent Labs/Diagnostic Results:   Results from last 7 days   Lab Units 09/09/23  0606 09/06/23  0506 09/05/23  0600   WBC Thousand/uL 5.57 8.66 8.56   HEMOGLOBIN g/dL 10.8* 11.6* 11.4*   HEMATOCRIT % 34.2* 37.2 35.5*   PLATELETS Thousands/uL 234 255 264   NEUTROS ABS Thousands/µL 4.79  --   --      Results from last 7 days   Lab Units 09/11/23  0532 09/09/23  0606 09/06/23  0506 09/05/23  0600   SODIUM mmol/L 144 148* 146 146   POTASSIUM mmol/L 3.4* 3.6 4.0 3.9   CHLORIDE mmol/L 113* 113* 110* 109*   CO2 mmol/L 27 27 29 30   ANION GAP mmol/L 4 8 7 7   BUN mg/dL 24 16 15 17   CREATININE mg/dL 0.69 0.68 0.79 0.80   EGFR ml/min/1.73sq m 122 122 115 114   CALCIUM mg/dL 9.0 9.2 9.3 9.3   CALCIUM, IONIZED mmol/L  --  1.12  --  1.15   MAGNESIUM mg/dL 2.4 2.4 2.4 2.4   PHOSPHORUS mg/dL 2.7 3.5 3.8  --      Results from last 7 days   Lab Units 09/11/23  1121 09/11/23  0531 09/10/23  0502 09/10/23  1717 09/10/23  1140 09/10/23  0535 09/09/23  2351 09/09/23  1723 09/09/23  1200 09/09/23  0543 09/08/23  2330 09/08/23  1754   POC GLUCOSE mg/dl 116 120 175* 141* 122 173* 141* 153* 126 128 162* 136     Results from last 7 days   Lab Units 09/11/23  0532 09/09/23  0606 09/06/23  0506 09/05/23  0600   GLUCOSE RANDOM mg/dL 119 132 92 97     Results from last 7 days   Lab Units 09/07/23  1435   CK TOTAL U/L 71     Results from last 7 days   Lab Units 09/11/23  0532 09/10/23  1151 09/10/23  0536 09/06/23  1832 09/06/23  0506 09/05/23  1409 09/05/23  0607   PROTIME seconds  --   --   --   --  14.0  --  13.9   INR   --   --   --   --  1.07  --  1.06   PTT seconds 23 85* 90*   < >  --    < >  --     < > = values in this interval not displayed.      Results from last 7 days   Lab Units 09/06/23  1530   GRAM STAIN RESULT  No No Polys or Bacteria seen     Results from last 7 days   Lab Units 09/06/23  1530   TOTAL COUNTED  100     Results from last 7 days   Lab Units 09/06/23  1530   APPEARANCE CSF  Clear/Colorelss   TUBE NUM CSF  4   WBC CSF /uL 5   XANTHOCHROMIA  No   LYMPHS % (CSF) % 98   MONOCYTES % (CSF) % 2   GLUCOSE CSF mg/dL 56   PROTEIN CSF mg/dL 43   CSF CULTURE  No growth     Medications:   Scheduled Medications:  albuterol, 2.5 mg, Nebulization, Q6H  apixaban, 5 mg, Oral, BID  chlorhexidine, 15 mL, Mouth/Throat, Q12H [facility]  [START ON 9/12/2023] cholecalciferol, 1,000 Units, Oral, Daily  FLUoxetine, 10 mg, Oral, Daily  insulin lispro, 2-12 Units, Subcutaneous, Q6H ISAEL  omeprazole (PRILOSEC) suspension 2 mg/mL, 20 mg, Oral, Daily  sodium chloride, 4 mL, Nebulization, Q6H  thiamine, 100 mg, Oral, Daily    PRN Meds:  acetaminophen, 650 mg, Oral, Q4H PRN  fentanyl citrate (PF), 50 mcg, Intravenous, Q1H PRN  heparin (porcine), 10,000 Units, Intravenous, Q6H PRN  heparin (porcine), 5,000 Units, Intravenous, Q6H PRN  polyethylene glycol, 17 g, Oral, Daily PRN  senna, 17.6 mg, Oral, Daily PRN    Discharge Plan: [facility]    Network Utilization Review Department  ATTENTION: Please call with any questions or concerns to [phone] and carefully listen to the prompts so that you are directed to the right person. All voicemails are confidential.  Ankit Philippe all requests for admission clinical reviews, approved or denied determinations and any other requests to dedicated fax number below belonging to the campus where the patient is receiving treatment.  List of dedicated fax numbers for the Facilities:  [place] DENIALS (Administrative/Medical Necessity) [phone] [address] [facility] (Maternity/NICU/Pediatrics) [phone]   [address] [place] [address] [phone]   [facility] [address] [facility] [phone]   [address] [facility] [address] [phone]   [address] [facility] [address] [facility] [address] [phone]

## 2023-09-11 NOTE — PLAN OF CARE
Problem: OCCUPATIONAL THERAPY ADULT  Goal: Performs self-care activities at highest level of function for planned discharge setting. See evaluation for individualized goals. Description: Treatment Interventions: ADL retraining, Functional transfer training, UE strengthening/ROM, Endurance training, Patient/family training, Equipment evaluation/education, Activityengagement, Compensatory technique education          See flowsheet documentation for full assessment, interventions and recommendations. Outcome: Progressing  Note: Limitation: Decreased ADL status, Decreased UE strength, Decreased Safe judgement during ADL, Decreased endurance, Decreased self-care trans, Decreased high-level ADLs (decreased balance and mobility)  Prognosis: Fair  Assessment: Patient seen for OT treatment s/p trach and PEG. Patient is cooperative and alert. Patient able to participate in OT session. Fatigued with activity. Patient will benefit from continued OT services to maximize functional performance with ADLS.      OT Discharge Recommendation: Post acute rehabilitation services

## 2023-09-12 PROBLEM — G70.9 NEUROMUSCULAR WEAKNESS (HCC): Status: ACTIVE | Noted: 2023-08-12

## 2023-09-12 PROBLEM — Z93.1 S/P PERCUTANEOUS ENDOSCOPIC GASTROSTOMY (PEG) TUBE PLACEMENT (HCC): Status: ACTIVE | Noted: 2023-09-12

## 2023-09-12 LAB
ACE CSF-CCNC: <1.5 U/L (ref 0–2.5)
ANION GAP SERPL CALCULATED.3IONS-SCNC: 5 MMOL/L
BUN SERPL-MCNC: 22 MG/DL (ref 5–25)
CALCIUM SERPL-MCNC: 8.7 MG/DL (ref 8.4–10.2)
CHLORIDE SERPL-SCNC: 112 MMOL/L (ref 96–108)
CO2 SERPL-SCNC: 28 MMOL/L (ref 21–32)
CREAT SERPL-MCNC: 0.8 MG/DL (ref 0.6–1.3)
GFR SERPL CREATININE-BSD FRML MDRD: 114 ML/MIN/1.73SQ M
GLUCOSE SERPL-MCNC: 89 MG/DL (ref 65–140)
GLUCOSE SERPL-MCNC: 91 MG/DL (ref 65–140)
MAGNESIUM SERPL-MCNC: 2.1 MG/DL (ref 1.9–2.7)
POTASSIUM SERPL-SCNC: 3.7 MMOL/L (ref 3.5–5.3)
SODIUM SERPL-SCNC: 145 MMOL/L (ref 135–147)

## 2023-09-12 PROCEDURE — 99291 CRITICAL CARE FIRST HOUR: CPT | Performed by: STUDENT IN AN ORGANIZED HEALTH CARE EDUCATION/TRAINING PROGRAM

## 2023-09-12 PROCEDURE — 83735 ASSAY OF MAGNESIUM: CPT

## 2023-09-12 PROCEDURE — 80048 BASIC METABOLIC PNL TOTAL CA: CPT

## 2023-09-12 PROCEDURE — 94669 MECHANICAL CHEST WALL OSCILL: CPT

## 2023-09-12 PROCEDURE — 99232 SBSQ HOSP IP/OBS MODERATE 35: CPT | Performed by: PSYCHIATRY & NEUROLOGY

## 2023-09-12 PROCEDURE — 97530 THERAPEUTIC ACTIVITIES: CPT

## 2023-09-12 PROCEDURE — 94760 N-INVAS EAR/PLS OXIMETRY 1: CPT

## 2023-09-12 PROCEDURE — NC001 PR NO CHARGE: Performed by: STUDENT IN AN ORGANIZED HEALTH CARE EDUCATION/TRAINING PROGRAM

## 2023-09-12 PROCEDURE — 82948 REAGENT STRIP/BLOOD GLUCOSE: CPT

## 2023-09-12 PROCEDURE — 97110 THERAPEUTIC EXERCISES: CPT

## 2023-09-12 PROCEDURE — 94003 VENT MGMT INPAT SUBQ DAY: CPT

## 2023-09-12 PROCEDURE — 94668 MNPJ CHEST WALL SBSQ: CPT

## 2023-09-12 PROCEDURE — 94640 AIRWAY INHALATION TREATMENT: CPT

## 2023-09-12 PROCEDURE — 94150 VITAL CAPACITY TEST: CPT

## 2023-09-12 RX ORDER — POTASSIUM CHLORIDE 20MEQ/15ML
40 LIQUID (ML) ORAL ONCE
Status: COMPLETED | OUTPATIENT
Start: 2023-09-12 | End: 2023-09-12

## 2023-09-12 RX ADMIN — POTASSIUM CHLORIDE 40 MEQ: 1.5 SOLUTION ORAL at 07:59

## 2023-09-12 RX ADMIN — ALBUTEROL SULFATE 2.5 MG: 2.5 SOLUTION RESPIRATORY (INHALATION) at 07:26

## 2023-09-12 RX ADMIN — SODIUM CHLORIDE SOLN NEBU 3% 4 ML: 3 NEBU SOLN at 07:26

## 2023-09-12 RX ADMIN — FLUOXETINE 10 MG: 10 CAPSULE ORAL at 08:00

## 2023-09-12 RX ADMIN — ALBUTEROL SULFATE 2.5 MG: 2.5 SOLUTION RESPIRATORY (INHALATION) at 01:10

## 2023-09-12 RX ADMIN — CHLORHEXIDINE GLUCONATE 15 ML: 1.2 RINSE ORAL at 21:05

## 2023-09-12 RX ADMIN — SODIUM CHLORIDE SOLN NEBU 3% 4 ML: 3 NEBU SOLN at 13:02

## 2023-09-12 RX ADMIN — ALBUTEROL SULFATE 2.5 MG: 2.5 SOLUTION RESPIRATORY (INHALATION) at 19:56

## 2023-09-12 RX ADMIN — SODIUM CHLORIDE SOLN NEBU 3% 4 ML: 3 NEBU SOLN at 01:10

## 2023-09-12 RX ADMIN — APIXABAN 5 MG: 5 TABLET, FILM COATED ORAL at 18:21

## 2023-09-12 RX ADMIN — CHLORHEXIDINE GLUCONATE 15 ML: 1.2 RINSE ORAL at 08:00

## 2023-09-12 RX ADMIN — Medication 1000 UNITS: at 08:00

## 2023-09-12 RX ADMIN — SODIUM CHLORIDE SOLN NEBU 3% 4 ML: 3 NEBU SOLN at 19:56

## 2023-09-12 RX ADMIN — APIXABAN 5 MG: 5 TABLET, FILM COATED ORAL at 08:00

## 2023-09-12 RX ADMIN — Medication 20 MG: at 08:02

## 2023-09-12 RX ADMIN — ALBUTEROL SULFATE 2.5 MG: 2.5 SOLUTION RESPIRATORY (INHALATION) at 13:02

## 2023-09-12 RX ADMIN — THIAMINE HCL TAB 100 MG 100 MG: 100 TAB at 08:00

## 2023-09-12 NOTE — ASSESSMENT & PLAN NOTE
Hx of hypertension.  PTA was on Torsemide 20 mg daily  · Patient has been normotensive  · Remains off anti-hypertensives

## 2023-09-12 NOTE — CASE MANAGEMENT
Case Management Progress Note    Patient name Sola Urena  Location ICU 01/ICU 01 MRN [id number]  : 1985 Date 2023       LOS (days): 19  Geometric Mean LOS (GMLOS) (days):   Days to GMLOS:        OBJECTIVE:    Current admission status: Inpatient  Preferred Pharmacy:   [address], [address] [address]  Phone: [phone] Fax: [phone]    Primary Care Provider: Megan Couch MD    Primary Insurance: [facility]  Secondary Insurance:     PROGRESS NOTE:    SW following to assist with DCP. Vent rehab placement is recommended. LTAC and vent facility referrals were initiated yesterday. LTAC referral was denied by all facilities due to insurance issues. Subacute vent facility referrals are still pending. Family meeting planned for tomorrow, 23, at 10:00 am.  Magalie Ignacio will continue to follow to monitor progress and assist with planning as needed.

## 2023-09-12 NOTE — PROGRESS NOTES
Neurology Consult Follow Up      Abel Nelson is a 41 y.o. male  ICU 01/ICU 01    [id number]        Assessment/Recommendations:    1.  Toxic metabolic encephalopathy  2.  Acute respiratory failure with persistent weakness  3.  Acute pulmonary embolism  4.  Testicular seminoma  5.  Sepsis   6. Proximal weakness  7 vitamin D deficiency.     -Critical care team management  -Neuro assessments  -Aspiration precautions  -Vent and trach management per critical care team  -IVIG 42.5 g IV daily x5 days-completed  -IV Solu-Medrol 1 g daily x3 days completed  -Recommend vitamin D repletion  -Repeat LP completed on 9/6/2023, hypocretin CSF testing pending. ACH binding/blocking/modulating negative, MuSK antibody negative, serum anti-TPO and DARIN antibodies negative, RPR negative, paraneoplastic panel was negative, varicella PCR negative, toxoplasma antibodies negative, rheumatoid labs negative  -Repeat MRI of the brain with orbits completed showing no acute intracranial pathology and unremarkable orbits. Noted improvement in enhancement of the hypothalamus with stable white matter changes  - VGCC serum testing pending  -Recommend outpatient EMG in 2 to 3 weeks postdischarge.       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  Patient was brought to [facility] after presenting to [address] [place] [address] with hypoxia and altered mental status.  He had previously been hospital to [facility] and had extensive work-up including an MRI of the brain with without contrast, LP, VEEG monitoring. June 2021, patient diagnosed with left testicular seminoma. At first did not follow-up with urology until December 2022 at which point showed lymphadenopathy and underwent a radical left orchiectomy December 2022.   Patient received chemotherapy initially, he was on cystoplasty and which caused him numbness and tingling sensations however continued on with the treatment until he developed diplopia with right-sided weakness and labile affect therefore his therapies were just discontinued. Patient had an MRI of the brain which showed a white matter changes. 8/12/2023, [facility] neurology team obtained MRI of the brain which demonstrated nonspecific enhancements along the anterior floor of the third ventricle/hypothalamus. vEEG which demonstrated intermittent excessive diffuse delta activity during drowsiness and excessive slow-wave and REM sleep periods. Patient had 2 episodes of leg and body jerking occurring during this test while in REM sleep periods suggestive of REM sleep atonia. Question possible differentials regarding Warnicke's encephalopathy, started on IV thiamine, vitamin B1 level 84  LP testing completed, all CSF studies pending relatively negative for any acute findings. Had slightly elevated protein and WBCs with no abnormal culture or Gram stain. Patient discharged home with nursing services however found to have low oxygen saturations and was returned to the ER on 8/23/2023. Patient found to have bilateral PE for which she was started on IV heparin drip. He had respiratory distress at the time, dropping his saturations and was intubated and transferred to [facility] for continued care. Continue supportive care, seen by neurology at [facility]. Remaining CSF studies had returned and were negative. Critical care team trying multiple times to wean patient from ventilator, he continued to have reports of proximal weakness with significantly low NIF studies. Additional labs were sent including autoimmune panels which were negative. RPR was negative. Patient does have low vitamin D for which she was recommended vitamin D repletion. Additional studies with anti-TPO, DARIN, MuSK antibodies, ACH binding/blocking/modulating testing all returned negative. Patient had been awake and able to follow commands in the ICU.   He continued to be intubated however was needing no sedation. He had reported having continued issues with vision including diplopia. Patient was having generalized weakness, noted LE >UE   Continue to evaluate patient for neuromuscular disorders, repeated his LP for additional studies, all have returned negative, patient's protein and WBCs have also normalized. Hypocretin remains pending at this time. Trial patient on IV Ig/IV Solu-Medrol was for further diagnostic purposes however patient continues to have proximal weakness with decreased NIF scores, -17 today. Patient has been unable to tolerate weaning trials, he was sent for surgical procedure for tracheostomy and PEG tube placement. He is now on vent through tracheostomy. He is able to communicate slightly better however has no verbalization. Today patient participates in neurological exam.  CN II through XII is intact with the exception of subjective reports of diplopia. Patient has no ataxia noted on coordination testing. Deferred gait and Romberg due to safety. He has lower extremity weakness, 3+/5 as he was able to slightly elevate off the chair however unable to fully maintain in the air for count of 5. Patient has increased strength to the upper extremity however notes pain and discomfort to the left shoulder therefore was limited with left upper extremity strength. Would recommend in the outpatient study, neuromuscular evaluation through EMG. Recommend EMG studies to be done in approximately 2 to 3 weeks postdischarge. Recommend patient discharged to long-term acute care for further vent weaning and strengthening. Patient may need assistance through social work team to coordinate EMG in the outpatient setting. Laith Lot will need follow up in in 6 weeks with general attending. He will require a EMG/NCS within 2-3 weeks.   Patient may be going to long-term acute care, may need assistance in working out EMG testing to be completed within the 2 to 3-week timeframe. Chief Complaint:    Subjective:   Patient reports ongoing issues with diplopia. He reports having lower extremity weakness bilaterally. States his legs feel very heavy however is able to participate with some of the neurological exam.  Denies any headache or dizziness at this time. Does endorse having left shoulder pain limiting his range of motion. Some weakness noted on exam second to his complaints of pain. Past Medical History:   Diagnosis Date   • Anxiety    • Cancer ([address])    • Depression    • Psychiatric disorder     bipolar     Social History     Socioeconomic History   • Marital status: Single     Spouse name: Not on file   • Number of children: Not on file   • Years of education: Not on file   • Highest education level: Not on file   Occupational History   • Not on file   Tobacco Use   • Smoking status: Former     Packs/day: 0.50     Years: 5.00     Total pack years: 2.50     Types: Cigarettes     Start date: 1/1/2008   • Smokeless tobacco: Never   Vaping Use   • Vaping Use: Never used   Substance and Sexual Activity   • Alcohol use: Not Currently     Comment: 2 cases of beer daily, stopped 3 years ago   • Drug use: Yes     Types: Marijuana     Comment: Medical marijuana   • Sexual activity: Not Currently   Other Topics Concern   • Not on file   Social History Narrative    ** Merged History Encounter **          Social Determinants of Health     Financial Resource Strain: Not on file   Food Insecurity: No Food Insecurity (8/29/2023)    Hunger Vital Sign    • Worried About Running Out of Food in the Last Year: Never true    • Ran Out of Food in the Last Year: Never true   Transportation Needs: No Transportation Needs (8/29/2023)    PRAPARE - Transportation    • Lack of Transportation (Medical): No    • Lack of Transportation (Non-Medical):  No   Physical Activity: Not on file   Stress: Not on file   Social Connections: Not on file   Intimate Partner Violence: Not on file   Housing Stability: Low Risk  (8/12/2023)    Housing Stability Vital Sign    • Unable to Pay for Housing in the Last Year: No    • Number of Places Lived in the Last Year: 1    • Unstable Housing in the Last Year: No     Family History   Problem Relation Age of Onset   • Coronary artery disease Maternal Aunt        ROS:  Please see HPI for positive symptoms. No fever, no chills, no weight change. Ocular: + diplopia, no blurred vision, spot/zigzag lines   HEENT:  No sore throat, headache or congestion. No neck pain. COR:  No chest pain. No palpitations. Lungs:  no sob  GI:  no  nausea, no vomiting, no diarrhea, no constipation, no anorexia. :  No dysuria, frequency, or urgency. No hematuria. Musculoskeletal:  No joint pain or swelling   + Pain to the left shoulder  + Generalized weakness with increased weakness to the legs  Skin:  No rash or itching. Psychiatric:  no anxiety, no depression. Endocrine:  No polyuria or polydipsia. Objective:  /64   Pulse 65   Temp 100.5 °F (38.1 °C)   Resp (!) 28   Ht 6' 4" (1.93 m)   Wt 134 kg (296 lb 1.2 oz)   SpO2 98%   BMI 36.04 kg/m²     General: alert   Mental status: Unable to fully assess due to patient's inability to communicate verbally  Attention: normal  Knowledge: Unable to fully assess due to patient's inability to communicate verbally  Language and Speech: Unable to assess due to tracheostomy and ventilatory dependency. Cranial nerves:   CN II: Visual fields are full to confrontation. Patient subjectively reporting diplopia  Fundoscopic exam is normal with sharp discs and no vascular changes. Pupils are 3 mm and briskly reactive to light. CN III, IV, VI: At primary gaze, there is no eye deviation. CN V: Facial sensation is intact in all 3 divisions bilaterally. Corneal responses are intact. CN VII: Face is symmetrical, with normal eye closure and smile.   CN VIII: Hearing is normal to rubbing fingers  CN IX, X: Palate elevates symmetrically. Phonation is normal.  CN XI: Head turning and shoulder shrug are intact  CN XII: Tongue is midline with normal movements and no atrophy. Motor: There is no pronator drift of out-stretched arms. Muscle bulk and tone are normal.      Muscle exam  Arm Right Left Leg Right Left   Deltoid   4+/5 3+/5 Iliopsoas 3+/5  3+/5   Biceps  4+/5 4/5 Quads 3+/5 3+/5   Triceps 4+/5 4/5 Hamstrings 3+/5 3+/5   Wrist Extension 4/5 4/5 Ankle Dorsi Flexion 4/5 4/5   Wrist Flexion 4/5 4/5 Ankle Plantar Flexion 4/5 4/5       Sensory: normal to light touch, temperature, vibration. Proprioception intact  Gait: Gait testing has been deferred due to weakness   coordination: finger to nose normal.  Unable to perform heel-to-shin due to lower extremity weakness     Reflexes    RJ BJ TJ KJ AJ Plantars Cornejo's   Right 2+ 2+  2+ 2+ Downgoing Not present   Left 2+ 2+  2+ 2+ Downgoing Not present      Heart: Regular rate and rhythm  Lung: Remains on ventilator through tracheostomy  Abd: soft, non-tender, non-distended   Skin: dry and intact    Labs:      Lab Results   Component Value Date    WBC 5.57 09/09/2023    HGB 10.8 (L) 09/09/2023    HCT 34.2 (L) 09/09/2023    MCV 99 (H) 09/09/2023     09/09/2023     Lab Results   Component Value Date    HGBA1C 5.2 12/10/2022     Lab Results   Component Value Date    ALT 55 (H) 08/26/2023    AST 23 08/26/2023    ALKPHOS 90 08/26/2023     Lab Results   Component Value Date    GLUCOSE 93 07/28/2023    CALCIUM 8.7 09/12/2023    K 3.7 09/12/2023    CO2 28 09/12/2023     (H) 09/12/2023    BUN 22 09/12/2023    CREATININE 0.80 09/12/2023         Review of reports and notes reveal:   Independent review of films/reports:  MRI brain and orbits wo and w contrast    Result Date: 9/9/2023  No acute intracranial pathology. Unremarkable MRI of the orbits. Improving enhancement of the hypothalamus. 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 Workstation performed: [id number]     IR lumbar puncture    Result Date: 9/6/2023  Impression: Successful fluoroscopic-guided lumbar puncture. Workstation performed: [id number]         Thank you for this consult. Total time of encounter:  30 min  More than 50% of the time was used in counseling and/or coordination of care  Extent of couseling and/or coordination of care with patient and his family at bedside.   Discussed with medical team and attending neurology MD.

## 2023-09-12 NOTE — ASSESSMENT & PLAN NOTE
Previous echocardiogram 6/2/23 showed EF 41%, normal diastolic dysfunction, mild left ventricular hypertrophy, mild RVSP elevation and mild TR  · PTA Med: Torsemide 20 mg daily  · Evaluated by Heart Failure team in July 2023.  LVH thought to be secondary to hypertension, obesity, hx of meth use  · Recommended sleep study at that time   · Echo this admission shows normal EF of 60%, normal LV thickness, no diastolic dysfunction

## 2023-09-12 NOTE — UTILIZATION REVIEW
Continued Stay Review    Date: 09/12/2023                          Current Patient Class: Inpatient  Current Level of Care: Critical Care    HPI:38 y.o. male initially admitted on 08/24/2023     Assessment/Plan:   Acute Respiratory Failure with Hypoxia. Cardio-Pulmonary monitoring. Trac (9/8) / Vented. FiO2 40%, PEEP 6, PS 8cm H2O. Continue weans. Continue Pulmonary Hygiene. PEG Tube (9/8), feedings at goal.  Continue nebs q6h. PT/OT. Neuro checks.       Vital Signs: /74   Pulse 79   Temp 100 °F (37.8 °C) (Tympanic)   Resp (!) 27   Ht 6' 4" (1.93 m)   Wt 134 kg (296 lb 1.2 oz)   SpO2 100%   BMI 36.04 kg/m²   Date and Time Eye Opening Best Verbal Response Best Motor Response Purvi Coma Scale Score   09/12/23 1200 4 1 6 11   09/12/23 0800 4 1 6 11   09/12/23 0400 4 1 6 11   09/12/23 0000 4 1 6 11       Pertinent Labs/Diagnostic Results:     Results from last 7 days   Lab Units 09/09/23  0606 09/06/23  0506   WBC Thousand/uL 5.57 8.66   HEMOGLOBIN g/dL 10.8* 11.6*   HEMATOCRIT % 34.2* 37.2   PLATELETS Thousands/uL 234 255   NEUTROS ABS Thousands/µL 4.79  --      Results from last 7 days   Lab Units 09/12/23  0529 09/11/23  0532 09/09/23  0606 09/06/23  0506   SODIUM mmol/L 145 144 148* 146   POTASSIUM mmol/L 3.7 3.4* 3.6 4.0   CHLORIDE mmol/L 112* 113* 113* 110*   CO2 mmol/L 28 27 27 29   ANION GAP mmol/L 5 4 8 7   BUN mg/dL 22 24 16 15   CREATININE mg/dL 0.80 0.69 0.68 0.79   EGFR ml/min/1.73sq m 114 122 122 115   CALCIUM mg/dL 8.7 9.0 9.2 9.3   CALCIUM, IONIZED mmol/L  --   --  1.12  --    MAGNESIUM mg/dL 2.1 2.4 2.4 2.4   PHOSPHORUS mg/dL  --  2.7 3.5 3.8     Results from last 7 days   Lab Units 09/12/23  0530 09/11/23  2331 09/11/23  1719 09/11/23  1121 09/11/23  0531 09/10/23  2349 09/10/23  1717 09/10/23  1140 09/10/23  0535 09/09/23  2351 09/09/23  1723 09/09/23  1200   POC GLUCOSE mg/dl 91 99 104 116 120 175* 141* 122 173* 141* 153* 126     Results from last 7 days   Lab Units 09/12/23  0529 09/11/23  0532 09/09/23  0606 09/06/23  0506   GLUCOSE RANDOM mg/dL 89 119 132 92     Results from last 7 days   Lab Units 09/07/23  1435   CK TOTAL U/L 71     Results from last 7 days   Lab Units 09/11/23  0532 09/10/23  1151 09/10/23  0536 09/06/23  1832 09/06/23  0506   PROTIME seconds  --   --   --   --  14.0   INR   --   --   --   --  1.07   PTT seconds 23 85* 90*   < >  --     < > = values in this interval not displayed. Results from last 7 days   Lab Units 09/06/23  1530   GRAM STAIN RESULT  No No Polys or Bacteria seen     Results from last 7 days   Lab Units 09/06/23  1530   TOTAL COUNTED  100     Results from last 7 days   Lab Units 09/06/23  1530   APPEARANCE CSF  Clear/Colorelss   TUBE NUM CSF  4   WBC CSF /uL 5   XANTHOCHROMIA  No   LYMPHS % (CSF) % 98   MONOCYTES % (CSF) % 2   GLUCOSE CSF mg/dL 56   PROTEIN CSF mg/dL 43   CSF CULTURE  No growth     Medications:   Scheduled Medications:  albuterol, 2.5 mg, Nebulization, Q6H  apixaban, 5 mg, Oral, BID  chlorhexidine, 15 mL, Mouth/Throat, Q12H ISAEL  cholecalciferol, 1,000 Units, Oral, Daily  FLUoxetine, 10 mg, Oral, Daily  omeprazole (PRILOSEC) suspension 2 mg/mL, 20 mg, Oral, Daily  sodium chloride, 4 mL, Nebulization, Q6H  thiamine, 100 mg, Oral, Daily      Continuous IV Infusions:     PRN Meds:  acetaminophen, 650 mg, Oral, Q4H PRN  polyethylene glycol, 17 g, Oral, Daily PRN  senna, 17.6 mg, Oral, Daily PRN        Discharge Plan: D - LTAC    Network Utilization Review Department  ATTENTION: Please call with any questions or concerns to [phone] and carefully listen to the prompts so that you are directed to the right person. All voicemails are confidential.  Carlyle Kocher all requests for admission clinical reviews, approved or denied determinations and any other requests to dedicated fax number below belonging to the campus where the patient is receiving treatment.  List of dedicated fax numbers for the Facilities:  [phone]  NUMBER   ADMISSION DENIALS (Administrative/Medical Necessity) [phone] [address] (Maternity/NICU/Pediatrics) [address] [facility] [phone]   [facility] [address] [facility] [address] [phone]

## 2023-09-12 NOTE — ASSESSMENT & PLAN NOTE
· Testicular cancer s/p left orchiectomy on 12/2022  · Repeat CT scan in January 2023 with enlarging lymph node.   · Began Chemo in March 2023: Etoposide and Cisplatin with plan for 4 cycles, 5 days every 21 days  · Did not complete chemo treatment due to adverse effects  · Stopped Cisplatin after 1 cycle due to painful neuropathy  · Continued Etoposide 2nd cycle  · Resumed Cisplatin and Etoposide 3rd cycle  · After 3rd cycle reported double vision, labile affect and right sided weakness and therefore chemotherapy was stopped on 5/26/23  · Continue outpatient follow up with Heme/Onc

## 2023-09-12 NOTE — PROGRESS NOTES
Critical Care Attending Note; Janie Corona   Note Date: 23  Note Time: 8:17 AM    Patient: Hannah Hendrix  Age, : 41 y.o., 1985 MRN: [id number] Code Status: Level 1 - Full Code Patient Location: ICU /ICU    Hospital LOS:19 days  ]   Patient seen and examined, medical record reviewed, discussed with house staff and nursing staff. HPI   CC: AMS  38M with a PMH of Met L Testicular Seminoma(s/p orchiectomy/chemotherapy), HFpEF, HTN, Obesity, OHS, remote meth/EtOH abuse and depression and recent admission  for AMS(Concerning for Wernickes vs Narcolepsy) who represents with AMS, weakness,  hypoxia reqruiring intubation    Key Recent Events   : Admitted, Intubated  : Trach/PEG, Pulse dose steroids, IVIG     Main ICU Plans:       #Neuro  Weakness  - Unclear Etiology - Negative workup so far(- Ach, Normal CSF protein, paraneoplastic panel) - completing IVIG - May benefit from EMG or muscle biopsy   - IVIG Completed   - Completed Pulse dose steroid   - Neurology Consulted - Appreciate recs  - Follow up rheumatic workup, CSF  - NIF Daily -[id number] - low likelyhood he will be able to wean at this time  - PT/OT     #Card  Pulmonary Embolism   - Eloquis    #Pulm  Acute Respiratory Failure  - AC/VC TV 500ml  - Wean FiO2 - Maintain O2 Sat >90%  - Trach Care/Vap Bundle      #Endo  Hyperglycemic S/t Steroid - resolved      #DVT/GI ppx  -Apixban    #Lines/Tubes/Drains:   Peripheral IV 23 Right Antecubital (Active)   Number of days: 2       #Nutrition:   Diet Enteral/Parenteral; Tube Feeding No Oral Diet; Jevity 1.2 Scott; Continuous; 70; Prosource Protein Liquid - One Packet Daily; 200        #Code Status:   Level 1 - Full Code    #Dispo:   Gagan Abdi MD  Pulmonary, Critical Care    Critical care time, excluding procedures, teaching, family meetings, and excludes any prior time recorded by the AP/resident, 35 minutes.  Upon my evaluation, this patient has a high probability of imminent or life-threatening deterioration due to above problems which required my direct attention, intervention, and personal management. Impression/Active Problems:    Testicular Seminoma   HFpEF   HTN   Obesity   OHS   Wernickes encephalopathy   Myopathy      Physical Exam:     Vital Signs:   Weight: 134 kg (296 lb 1.2 oz)  IBW: Ideal body weight: 86.8 kg (191 lb 5.7 oz)  Adjusted ideal body weight: 106 kg (233 lb 3.9 oz)  Temp:  [97.1 °F (36.2 °C)-98.6 °F (37 °C)] 98.2 °F (36.8 °C)  HR:  [47-82] 76  Resp:  [14-33] 25  BP: ()/(56-78) 117/70  FiO2 (%):  [40] 40  General: NAD  Neuro: Alert, follows commands, 3-4/5 UE weaknes  Heart: RRR  Lungs: Basilar crackles  Abdomen: Soft NT  Extremities: No Edema                Ventilator Settings:     Vent Mode: AC/VC  FiO2 (%):  [40] 40          Invalid input(s): "PCO2", "O2"  Radiologic Images Reviewed:    MRI Brain  No acute intracranial pathology. Unremarkable MRI of the orbits. Improving enhancement of the hypothalamus. 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 CXR  Improved aeration of the bilateral lungs with mild bibasilar atelectasis.      Input / Output:       Intake/Output Summary (Last 24 hours) at 9/12/2023 0817  Last data filed at 9/12/2023 0601  Gross per 24 hour   Intake 1560 ml   Output --   Net 1560 ml            Infusions:     Scheduled Medications:  Current Facility-Administered Medications   Medication Dose Route Frequency Provider Last Rate   • acetaminophen  650 mg Oral Q4H PRN AMBER Rice     • albuterol  2.5 mg Nebulization Q6H AMBER Rice     • apixaban  5 mg Oral BID AMBER Obando     • chlorhexidine  15 mL Mouth/Throat Q12H 1545 AMBER Cerrato     • cholecalciferol  1,000 Units Oral Daily Dayan Beltre MD     • FLUoxetine  10 mg Oral Daily AMBER Mei     • omeprazole (PRILOSEC) suspension 2 mg/mL  20 mg Oral Daily  Lola Meth, CRNP     • polyethylene glycol  17 g Oral Daily PRN  Lola Meth, CRNP     • senna  17.6 mg Oral Daily PRN  Lola Meth, CRNP     • sodium chloride  4 mL Nebulization Q6H  Lola Meth, CRNP     • thiamine  100 mg Oral Daily  Lola Meth, CRNP         PRN Medications:  •  acetaminophen  •  polyethylene glycol  •  senna    Labs Reviewed:  Results from last 7 days   Lab Units 09/09/23  0606 09/06/23  0506   WBC Thousand/uL 5.57 8.66   HEMOGLOBIN g/dL 10.8* 11.6*   HEMATOCRIT % 34.2* 37.2   PLATELETS Thousands/uL 234 255      Results from last 7 days   Lab Units 09/12/23  0529 09/11/23  0532 09/09/23  0606 09/06/23  0506   SODIUM mmol/L 145 144 148* 146   CO2 mmol/L 28 27 27 29   BUN mg/dL 22 24 16 15   CALCIUM mg/dL 8.7 9.0 9.2 9.3   MAGNESIUM mg/dL 2.1 2.4 2.4 2.4   PHOSPHORUS mg/dL  --  2.7 3.5 3.8         Invalid input(s): "ASTSGOT", "ALTSGPT"LABRCNTIP@ ,alkphos:3,tbilirubin:3,dbilirubin:3)@  Results from last 7 days   Lab Units 09/06/23  0506   INR  1.07     Invalid input(s): "TROPT", "CPK", "PBNP"             I have personally seen and examined the patient on (09/12/23 between [id number]). I discussed the patient with the AP/resident including, but not limited to, verifying findings; reviewing labs and x-rays; discussing with consultants; developing the plan of care with the bedside nurse; and discussing treatment plan with patient or surrogate. I have reviewed the note and assessment performed by the AP/resident and agree with the AP/resident’s documented findings and plan of care with the above additions/exceptions. Please see my comments for details and adjustments.

## 2023-09-12 NOTE — ASSESSMENT & PLAN NOTE
Patient initially presented to [address] [place] [address] on 8/23 with hypoxia and encephalopathy. Acute change in GCS prompted rapid response and subsequent intubation for airway protection. Of note, pt w/ recent hospitalization to [facility] 8/11-8/17 for encephalopathy with unclear etiology despite extensive workup but Wernicke's encephalopathy was on the differential.    ·  [place] workup 8/11:  · Rapid response and intubation on 8/12 for airway protection, extubated on 8/13  · CT head 8/11: No acute intracranial abnormality. Stable small bilateral subcortical hypoattenuating foci. No associated mass effect. · MRI 8/12: No acute intracranial abnormality. Stable nonspecific enhancement along the anterior floor of the third ventricle/hypothalamus compared to March 2023 study. Findings below:  · VEEG 8/12: negative for seizure activity. Possible findings relating to underlying sleep disorder   · LP: Grossly negative overall  · DSPO: Treated with high dose thiamine. Psych evaluated, possible bipolar disorder. Discharged to home with his sister with visiting nurses, ST/PT/OT  · [facility] 8/23: Presented with hypoxia found by visiting nurse SPO2 in 80's  · 8/24: RRT > intubated for airway protection   · Transferred to [facility] on 8/24  · [facility] 8/24 (Current hospital course)  · CT head 8/24: No acute intracranial abnormality  · 8/25 EEG: No evidence of electrographic seizures. Mild background slowing suggestive of underlying cerebral dysfunction from toxic/metablic/infectious/hypoxic encephalopathy. Clinical correlation is recommended  · On neurological exam: wakes to voice, follows 2 step commands   · Continues to have global weakness with poor NIF's unable to wean from ventilator   · Ach binding and blocking Ab negative   · MUSK Ab and ACHR modulating Ab neg  · Serum anti-TPO and DARIN Ab's, RPR, neg.  CSF paraneoplastic panel neg  · Varicella PCR neg  · Toxoplasma antibodies negative  · Rheum labs negative  · 9/6 repeat LP cultures negative   · 9/7 Completed 7 days of Ceftriaxone to cover for possible neurosyphillis  · MRI 9/9: No acute intracranial pathology. Unremarkable MRI of the orbits. Improving enhancement of the hypothalamus. 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   · 9/10 completed 3 days pulse dose steroids  · 9/11 completed 5 days IVIG     Plan:  · Neurology following  · [facility] serum test pending   · Hypocretin CSF pending   · Continue thiamine  · NIF daily   · Consider EMG  · Neurochecks per routine

## 2023-09-12 NOTE — PLAN OF CARE
Problem: RESPIRATORY - ADULT  Goal: Achieves optimal ventilation and oxygenation  Description: INTERVENTIONS:  - Assess for changes in respiratory status  - Assess for changes in mentation and behavior  - Position to facilitate oxygenation and minimize respiratory effort  - Oxygen administered by appropriate delivery if ordered  - Encourage broncho-pulmonary hygiene including cough, deep breathe, I  - Assess the need for suctioning and aspirate as needed  - Assess and instruct to report SOB or any respiratory difficulty  - Respiratory Therapy support as indicated  Outcome: Progressing

## 2023-09-12 NOTE — OCCUPATIONAL THERAPY NOTE
OT TREATMENT         09/12/23 1514   OT Last Visit   OT Visit Date 09/12/23   Note Type   Note Type Treatment   Pain Assessment   Pain Assessment Tool 0-10   Pain Score No Pain   Restrictions/Precautions   Other Precautions Chair Alarm; Bed Alarm;Multiple lines; Fall Risk;O2  (trach and PEG)   Bed Mobility   Rolling R 2  Maximal assistance   Rolling L 2  Maximal assistance   Transfers   Sit to Stand 2  Maximal assistance   Additional items Assist x 2;Verbal cues  (use of RW, 3 attempts, almost fully upright last tiral)   Stand to Sit 2  Maximal assistance   Additional items Assist x 2   Mechanical lift 1  Dependent   Additional items   (recliner chair to bed with overhead Guldmann lift)   ROM- Right Upper Extremities   R Shoulder AROM; Flexion   R Elbow AROM;Elbow extension;Elbow flexion   R Hand AROM; Index finger; Thumb; Long finger;Ring finger;Little finger   R Weight/Reps/Sets 5 times each seated in chair, shoulder flexion to 60 degrees   ROM - Left Upper Extremities    L Shoulder AROM; Flexion   L Elbow AROM;Elbow flexion;Elbow extension   L Hand AROM; Thumb; Index finger; Long finger;Ring finger;Little finger   L Weight/Reps/Sets 5 times each seated in recliner chair, AROM shoulder to 40 degrees   Cognition   Arousal/Participation Alert; Cooperative   Attention Within functional limits   Following Commands Follows one step commands with increased time or repetition   Assessment   Assessment Patient seen for OT treatment. Patient is cooperative, pleasant and alert. Patient is demonstrating progress towards OT goals. Patient will benefit from continued OT services to maximize functional performance with ADLS. The patient's raw score on the AM-PAC Daily Activity Inpatient Short Form is 11. A raw score of less than 19 suggests the patient may benefit from discharge to post-acute rehabilitation services. Please refer to the recommendation of the Occupational Therapist for safe discharge planning.    Plan   Treatment Interventions UE strengthening/ROM; Functional transfer training; Endurance training;Patient/family training;Equipment evaluation/education; Activityengagement   OT Frequency 3-5x/wk   Recommendation   OT Discharge Recommendation Post acute rehabilitation services   AM-PAC Daily Activity Inpatient   Lower Body Dressing 1   Bathing 2   Toileting 2   Upper Body Dressing 2   Grooming 3   Eating 1   Daily Activity Raw Score 11   Daily Activity Standardized Score (Calc for Raw Score >=11) 29.04   AM-PAC Applied Cognition Inpatient   Following a Speech/Presentation 3   Understanding Ordinary Conversation 4   Taking Medications 3   Remembering Where Things Are Placed or Put Away 2   Remembering List of 4-5 Errands 2   Taking Care of Complicated Tasks 2   Applied Cognition Raw Score 16   Applied Cognition Standardized Score 97.92   Licensure   [place] License Number  Londell Manan [id number] [facility] OTR/L [id number]

## 2023-09-12 NOTE — PLAN OF CARE
Problem: Prexisting or High Potential for Compromised Skin Integrity  Goal: Skin integrity is maintained or improved  Description: INTERVENTIONS:  - Identify patients at risk for skin breakdown  - Assess and monitor skin integrity  - Assess and monitor nutrition and hydration status  - Monitor labs   - Assess for incontinence   - Turn and reposition patient  - Assist with mobility/ambulation  - Relieve pressure over bony prominences  - Avoid friction and shearing  - Provide appropriate hygiene as needed including keeping skin clean and dry  - Evaluate need for skin moisturizer/barrier cream  - Collaborate with interdisciplinary team   - Patient/family teaching  - Consider wound care consult   Outcome: Progressing     Problem: MOBILITY - ADULT  Goal: Maintain or return to baseline ADL function  Description: INTERVENTIONS:  -  Assess patient's ability to carry out ADLs; assess patient's baseline for ADL function and identify physical deficits which impact ability to perform ADLs (bathing, care of mouth/teeth, toileting, grooming, dressing, etc.)  - Assess/evaluate cause of self-care deficits   - Assess range of motion  - Assess patient's mobility; develop plan if impaired  - Assess patient's need for assistive devices and provide as appropriate  - Encourage maximum independence but intervene and supervise when necessary  - Involve family in performance of ADLs  - Assess for home care needs following discharge   - Consider OT consult to assist with ADL evaluation and planning for discharge  - Provide patient education as appropriate  Outcome: Progressing  Goal: Maintains/Returns to pre admission functional level  Description: INTERVENTIONS:  - Perform BMAT or MOVE assessment daily.   - Set and communicate daily mobility goal to care team and patient/family/caregiver. - Collaborate with rehabilitation services on mobility goals if consulted  - Perform Range of Motion 4 times a day.   - Reposition patient every 2 hours.  -- Record patient progress and toleration of activity level   Outcome: Progressing     Problem: CARDIOVASCULAR - ADULT  Goal: Maintains optimal cardiac output and hemodynamic stability  Description: INTERVENTIONS:  - Monitor I/O, vital signs and rhythm  - Monitor for S/S and trends of decreased cardiac output  - Administer and titrate ordered vasoactive medications to optimize hemodynamic stability  - Assess quality of pulses, skin color and temperature  - Assess for signs of decreased coronary artery perfusion  - Instruct patient to report change in severity of symptoms  Outcome: Progressing  Goal: Absence of cardiac dysrhythmias or at baseline rhythm  Description: INTERVENTIONS:  - Continuous cardiac monitoring, vital signs, obtain 12 lead EKG if ordered  - Administer antiarrhythmic and heart rate control medications as ordered  - Monitor electrolytes and administer replacement therapy as ordered  Outcome: Progressing     Problem: GENITOURINARY - ADULT  Goal: Maintains or returns to baseline urinary function  Description: INTERVENTIONS:  - Assess urinary function  - Encourage oral fluids to ensure adequate hydration if ordered  - Administer IV fluids as ordered to ensure adequate hydration  - Administer ordered medications as needed  - Offer frequent toileting  - Follow urinary retention protocol if ordered  Outcome: Progressing  Goal: Absence of urinary retention  Description: INTERVENTIONS:  - Assess patient’s ability to void and empty bladder  - Monitor I/O  - Bladder scan as needed  - Discuss with physician/AP medications to alleviate retention as needed  - Discuss catheterization for long term situations as appropriate  Outcome: Progressing     Problem: METABOLIC, FLUID AND ELECTROLYTES - ADULT  Goal: Electrolytes maintained within normal limits  Description: INTERVENTIONS:  - Monitor labs and assess patient for signs and symptoms of electrolyte imbalances  - Administer electrolyte replacement as ordered  - Monitor response to electrolyte replacements, including repeat lab results as appropriate  - Instruct patient on fluid and nutrition as appropriate  Outcome: Progressing  Goal: Fluid balance maintained  Description: INTERVENTIONS:  - Monitor labs   - Monitor I/O and WT  - Instruct patient on fluid and nutrition as appropriate  - Assess for signs & symptoms of volume excess or deficit  Outcome: Progressing     Problem: SKIN/TISSUE INTEGRITY - ADULT  Goal: Skin Integrity remains intact(Skin Breakdown Prevention)  Description: Assess:  -Perform Adria assessment every shift   -Clean and moisturize skin every 2 hours and PRN   -Inspect skin when repositioning, toileting, and assisting with ADLS  -Assess under medical devices such as cardiac monitor wires IV tubing every 2 hours and PRN   -Assess extremities for adequate circulation and sensation     Bed Management:  -Have minimal linens on bed & keep smooth, unwrinkled  -Change linens as needed when moist or perspiring  -Avoid sitting or lying in one position for more than 2 hours while in bed  -Keep HOB at 30degrees     Toileting:  -Offer bedside commode  -Assess for incontinence every 2 hours  -Use incontinent care products after each incontinent episode such as pads    Activity:  -Mobilize patient 3 times a day  -Encourage or provide ROM exercises   -Turn and reposition patient every 2 Hours  -Use appropriate equipment to lift or move patient in bed  -Instruct/ Assist with weight shifting every hour when out of bed in chair      Skin Care:  -Avoid use of baby powder, tape, friction and shearing, hot water or constrictive clothing  -Relieve pressure over bony prominences using foam dressing  -Do not massage red bony areas    Next Steps:  -Consider consults to  interdisciplinary teams such as PT/OT  Outcome: Progressing  Goal: Pressure injury heals and does not worsen  Description: Interventions:  - Implement low air loss mattress or specialty surface (Criteria met)  - Apply silicone foam dressing  - Instruct/assist with weight shifting every 60  minutes when in chair   - Limit chair time to 2 hour intervals  - Use special pressure reducing interventions such as EHOB when in chair   - Apply fecal or urinary incontinence containment device   - Perform passive or active ROM every 2 hours   - Turn and reposition patient & offload bony prominences every 2 hours   - Utilize friction reducing device or surface for transfers   - Consider consults to  interdisciplinary teams such as PT/OT  - Use incontinent care products after each incontinent episode such as pads   - Consider nutrition services referral as needed  Outcome: Progressing     Problem: HEMATOLOGIC - ADULT  Goal: Maintains hematologic stability  Description: INTERVENTIONS  - Assess for signs and symptoms of bleeding or hemorrhage  - Monitor labs  - Administer supportive blood products/factors as ordered and appropriate  Outcome: Progressing     Problem: MUSCULOSKELETAL - ADULT  Goal: Maintain or return mobility to safest level of function  Description: INTERVENTIONS:  - Assess patient's ability to carry out ADLs; assess patient's baseline for ADL function and identify physical deficits which impact ability to perform ADLs (bathing, care of mouth/teeth, toileting, grooming, dressing, etc.)  - Assess/evaluate cause of self-care deficits   - Assess range of motion  - Assess patient's mobility  - Assess patient's need for assistive devices and provide as appropriate  - Encourage maximum independence but intervene and supervise when necessary  - Involve family in performance of ADLs  - Assess for home care needs following discharge   - Consider OT consult to assist with ADL evaluation and planning for discharge  - Provide patient education as appropriate  Outcome: Progressing  Goal: Maintain proper alignment of affected body part  Description: INTERVENTIONS:  - Support, maintain and protect limb and body alignment  - Provide patient/ family with appropriate education  Outcome: Progressing     Problem: PAIN - ADULT  Goal: Verbalizes/displays adequate comfort level or baseline comfort level  Description: Interventions:  - Encourage patient to monitor pain and request assistance  - Assess pain using appropriate pain scale  - Administer analgesics based on type and severity of pain and evaluate response  - Implement non-pharmacological measures as appropriate and evaluate response  - Consider cultural and social influences on pain and pain management  - Notify physician/advanced practitioner if interventions unsuccessful or patient reports new pain  Outcome: Progressing     Problem: INFECTION - ADULT  Goal: Absence or prevention of progression during hospitalization  Description: INTERVENTIONS:  - Assess and monitor for signs and symptoms of infection  - Monitor lab/diagnostic results  - Monitor all insertion sites, i.e. indwelling lines, tubes, and drains  - Monitor endotracheal if appropriate and nasal secretions for changes in amount and color  - Marble appropriate cooling/warming therapies per order  - Administer medications as ordered  - Instruct and encourage patient and family to use good hand hygiene technique  - Identify and instruct in appropriate isolation precautions for identified infection/condition  Outcome: Progressing     Problem: SAFETY ADULT  Goal: Maintain or return to baseline ADL function  Description: INTERVENTIONS:  -  Assess patient's ability to carry out ADLs; assess patient's baseline for ADL function and identify physical deficits which impact ability to perform ADLs (bathing, care of mouth/teeth, toileting, grooming, dressing, etc.)  - Assess/evaluate cause of self-care deficits   - Assess range of motion  - Assess patient's mobility; develop plan if impaired  - Assess patient's need for assistive devices and provide as appropriate  - Encourage maximum independence but intervene and supervise when necessary  - Involve family in performance of ADLs  - Assess for home care needs following discharge   - Consider OT consult to assist with ADL evaluation and planning for discharge  - Provide patient education as appropriate  Outcome: Progressing  Goal: Maintains/Returns to pre admission functional level  Description: INTERVENTIONS:  - Perform BMAT or MOVE assessment daily.   - Set and communicate daily mobility goal to care team and patient/family/caregiver. - Collaborate with rehabilitation services on mobility goals if consulted  - Perform Range of Motion 4 times a day. - Reposition patient every 2 hours. -- Record patient progress and toleration of activity level   Outcome: Progressing  Goal: Patient will remain free of falls  Description: INTERVENTIONS:  - Educate patient/family on patient safety including physical limitations  - Instruct patient to call for assistance with activity   - Consult OT/PT to assist with strengthening/mobility   - Keep Call bell within reach  - Keep bed low and locked with side rails adjusted as appropriate  - Keep care items and personal belongings within reach  - Initiate and maintain comfort rounds  - Make Fall Risk Sign visible to staff  - Offer Toileting every 2  Hours, in advance of need  - Initiate/Maintain bed and chair alarm  - Apply yellow socks and bracelet for high fall risk patients  - Consider moving patient to room near nurses station  Outcome: Progressing     Problem: Knowledge Deficit  Goal: Patient/family/caregiver demonstrates understanding of disease process, treatment plan, medications, and discharge instructions  Description: Complete learning assessment and assess knowledge base.   Interventions:  - Provide teaching at level of understanding  - Provide teaching via preferred learning methods  Outcome: Progressing     Problem: RESPIRATORY - ADULT  Goal: Achieves optimal ventilation and oxygenation  Description: INTERVENTIONS:  - Assess for changes in respiratory status  - Assess for changes in mentation and behavior  - Position to facilitate oxygenation and minimize respiratory effort  - Oxygen administered by appropriate delivery if ordered  - Encourage broncho-pulmonary hygiene including cough, deep breathe, I  - Assess the need for suctioning and aspirate as needed  - Assess and instruct to report SOB or any respiratory difficulty  - Respiratory Therapy support as indicated  Outcome: Progressing     Problem: Nutrition/Hydration-ADULT  Goal: Nutrient/Hydration intake appropriate for improving, restoring or maintaining nutritional needs  Description: Monitor and assess patient's nutrition/hydration status for malnutrition. Collaborate with interdisciplinary team and initiate plan and interventions as ordered. Monitor patient's weight and dietary intake as ordered or per policy. Utilize nutrition screening tool and intervene as necessary. Determine patient's food preferences and provide high-protein, high-caloric foods as appropriate.      INTERVENTIONS:  - Monitor urinary output, labs, and treatment plans  - Assess nutrition and hydration status and recommend course of action  - Recommend, monitor, and adjust tube feedings based on assessed needs  - Assess need for intravenous fluids  - Provide specific nutrition/hydration education as appropriate  - Include patient/family/caregiver in decisions related to nutrition  Outcome: Progressing

## 2023-09-12 NOTE — PROGRESS NOTES
Full follow-up note documented in flow sheet  Summary/ recommendations:    Per discussion with RN TF is held x2 hours for prilosec dosing (as per directions in [facility] and confirmed by RN);  RN reported loose/large BM's; to closer meet estimated needs and address loose BM's see recommendations    Suggest change TF formula to Jevity 1.5 at 65ml/hr (based on 22 hour infusion for prilosec dosing); increase Prosource to BID; Goal will provide 1430ml TF volume 2265 total kcal; 121g total protein; 1430ml free water; continue current water flushes; if BM's continue to be loose/large,  consider addition of banatrol via feeding tube, starting at 1 packet per day

## 2023-09-12 NOTE — ASSESSMENT & PLAN NOTE
Presented 8/24 with hypoxia, initially admitted to the floor on supplemental NC  · CTA PE: Pulmonary embolism in the right lower lobe segmental pulmonary artery. Measured RV/LV ratio was within normal limits at less than 0.9. · BNP 9, initial HS troponin 9  · Initiated on heparin gtt, transitioned to therapeutic Lovenox on 8/26  · Echo completed 8/25: Left Ventricle: Left ventricular cavity size is normal. Wall thickness is normal. The left ventricular ejection fraction is 60% by visual estimation. . Wall motion is normal. Diastolic function is normal for age. Right Ventricle: Right ventricular cavity size is normal. Systolic function is normal. Normal tricuspid annular plane systolic excursion (TAPSE) > 1.7 cm.   · Dopplers of LE neg for DVT bilaterally    Plan:  · Continue Eliquis

## 2023-09-12 NOTE — PROGRESS NOTES
[address] [facility]  Progress Note  Name: Everett Neville  MRN: [id number]  Unit/Bed#: ICU 01 I Date of Admission: 8/24/2023   Date of Service: 9/12/2023 I Hospital Day: 23    Assessment/Plan   * Acute respiratory failure with hypoxia [facility])  Assessment & Plan  Patient presented with hypoxia 8/23, found to have right lower lobe segmental PE. Initially saturating well on nasal cannula. Rapid response was called 8/24 secondary to hypoxia and altered mental status requiring emergent intubation. Hypoxic post intubation requiring high ventilator settings. · Of note, was intubated during hospital stay at [facility] 8/12-8/13 for airway protection when acutely had GCS of 5  · CTA PE study 8/23: PE in the right lower lobe segmental pulmonary artery  · CXR 8/23 infiltrates  · Strep pneumo/legionella urine antigen negative  · COVID/Flu/RSV negative  · 8/25: Bronchoscopy: poorly tolerated secondary to hypoxia. Some mucus plugging present on the left. · 8/26: CT chest: Complete right lower lobe and near complete left lower lobe atelectasis. Pneumonia not excluded in the appropriate clinical setting. Mild interstitial edema with trace effusions  · Received diuresis with IV Lasix   · 8/31 Completed Zosyn x 7 days   · 9/8 Trach placed  · 9/11 NIF -17    Plan:  · Continue PS weans   · Continue pulmonary hygiene   · Daily NIF's  · VAP ppx; chlorhexidine, PPI, HOB greater than 30 degrees  · LTACH planning    Neuromuscular weakness [facility])  Assessment & Plan  Patient initially presented to [facility] on 8/23 with hypoxia and encephalopathy. Acute change in GCS prompted rapid response and subsequent intubation for airway protection.  Of note, pt w/ recent hospitalization to [facility] 8/11-8/17 for encephalopathy with unclear etiology despite extensive workup but Wernicke's encephalopathy was on the differential.    · [facility] [place] workup 8/11:  · Rapid response and intubation on 8/12 for airway protection, extubated on 8/13  · CT head 8/11: No acute intracranial abnormality. Stable small bilateral subcortical hypoattenuating foci. No associated mass effect. · MRI 8/12: No acute intracranial abnormality. Stable nonspecific enhancement along the anterior floor of the third ventricle/hypothalamus compared to March 2023 study. Findings below:  · VEEG 8/12: negative for seizure activity. Possible findings relating to underlying sleep disorder   · LP: Grossly negative overall  · DSPO: Treated with high dose thiamine. Psych evaluated, possible bipolar disorder. Discharged to home with his sister with visiting nurses, ST/PT/OT  · [facility] ([place] 8/23: Presented with hypoxia found by visiting nurse SPO2 in 80's  · 8/24: RRT > intubated for airway protection   · Transferred to [facility] on 8/24  · [facility] 8/24 (Current hospital course)  · CT head 8/24: No acute intracranial abnormality  · 8/25 EEG: No evidence of electrographic seizures. Mild background slowing suggestive of underlying cerebral dysfunction from toxic/metablic/infectious/hypoxic encephalopathy. Clinical correlation is recommended  · On neurological exam: wakes to voice, follows 2 step commands   · Continues to have global weakness with poor NIF's unable to wean from ventilator   · Ach binding and blocking Ab negative   · MUSK Ab and ACHR modulating Ab neg  · Serum anti-TPO and DARIN Ab's, RPR, neg. CSF paraneoplastic panel neg  · Varicella PCR neg  · Toxoplasma antibodies negative  · Rheum labs negative  · 9/6 repeat LP cultures negative   · 9/7 Completed 7 days of Ceftriaxone to cover for possible neurosyphillis  · MRI 9/9: No acute intracranial pathology. Unremarkable MRI of the orbits. Improving enhancement of the hypothalamus. 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   · 9/10 completed 3 days pulse dose steroids  · 9/11 completed 5 days IVIG     Plan:  · Neurology following  · VGCC serum test pending   · Hypocretin CSF pending   · Continue thiamine  · NIF daily   · Consider EMG  · Neurochecks per routine     Acute pulmonary embolism without acute cor pulmonale [facility])  Assessment & Plan  Presented 8/24 with hypoxia, initially admitted to the floor on supplemental NC  · CTA PE: Pulmonary embolism in the right lower lobe segmental pulmonary artery. Measured RV/LV ratio was within normal limits at less than 0.9. · BNP 9, initial HS troponin 9  · Initiated on heparin gtt, transitioned to therapeutic Lovenox on 8/26  · Echo completed 8/25: Left Ventricle: Left ventricular cavity size is normal. Wall thickness is normal. The left ventricular ejection fraction is 60% by visual estimation. . Wall motion is normal. Diastolic function is normal for age. Right Ventricle: Right ventricular cavity size is normal. Systolic function is normal. Normal tricuspid annular plane systolic excursion (TAPSE) > 1.7 cm. · Dopplers of LE neg for DVT bilaterally    Plan:  · Continue Eliquis     History of LVH (left ventricular hypertrophy)  Assessment & Plan  Previous echocardiogram 6/2/23 showed EF 37%, normal diastolic dysfunction, mild left ventricular hypertrophy, mild RVSP elevation and mild TR  · PTA Med: Torsemide 20 mg daily  · Evaluated by Heart Failure team in July 2023. LVH thought to be secondary to hypertension, obesity, hx of meth use  · Recommended sleep study at that time   · Echo this admission shows normal EF of 60%, normal LV thickness, no diastolic dysfunction    Depression  Assessment & Plan  · Fluoxetine 10 mg daily  · Consider additional psych consult         Seminoma of left testis [facility])  Assessment & Plan  · Testicular cancer s/p left orchiectomy on 12/2022  · Repeat CT scan in January 2023 with enlarging lymph node.   · Began Chemo in March 2023: Etoposide and Cisplatin with plan for 4 cycles, 5 days every 21 days  · Did not complete chemo treatment due to adverse effects  · Stopped Cisplatin after 1 cycle due to painful neuropathy  · Continued Etoposide 2nd cycle  · Resumed Cisplatin and Etoposide 3rd cycle  · After 3rd cycle reported double vision, labile affect and right sided weakness and therefore chemotherapy was stopped on 5/26/23  · Continue outpatient follow up with Heme/Onc     Hypertension  Assessment & Plan  Hx of hypertension. PTA was on Torsemide 20 mg daily  · Patient has been normotensive  · Remains off anti-hypertensives     Umbilical hernia without obstruction and without gangrene  Assessment & Plan  · Umbilical hernia present, easily reducible     S/P percutaneous endoscopic gastrostomy (PEG) tube placement [facility])  Assessment & Plan  · Continue tube feeds at goal          ICU Core Measures     Vented Patient  VAP Bundle  VAP bundle ordered     A: Assess, Prevent, and Manage Pain · Has pain been assessed? Yes  · Need for changes to pain regimen? No   B: Both Spontaneous Awakening Trials (SATs) and Spontaneous Breathing Trials (SBTs) · Plan to perform spontaneous awakening trial today? N/A   · Plan to perform spontaneous breathing trial today? Yes   · Obvious barriers to extubation? Yes   C: Choice of Sedation · RASS Goal: 0 Alert and Calm or N/A patient not on sedation  · Need for changes to sedation or analgesia regimen? NA   D: Delirium · CAM-ICU: Negative   E: Early Mobility  · Plan for early mobility? Yes   F: Family Engagement · Plan for family engagement today? Yes       Review of Invasive Devices:      Central access plan: port not accessed       Prophylaxis:  VTE VTE covered by:  apixaban, Oral, 5 mg at 09/11/23 1721  heparin (porcine), Intravenous  heparin (porcine), Intravenous       Stress Ulcer  covered byomeprazole (PRILOSEC) suspension 2 mg/mL [[id number]]       Subjective   HPI/24hr events: Patient's neurological exam remains unchanged. He continues with global weakness and poor NIF's (-17). Continued with PS 12/6 and resting on vent settings overnight. Yesterday was day 5/5 for IVIG. No other significant events.    Review of Systems Neurological: Positive for weakness. Objective                            Vitals I/O      Most Recent Min/Max in 24hrs   Temp 98.2 °F (36.8 °C) Temp  Min: 97.1 °F (36.2 °C)  Max: 98.6 °F (37 °C)   Pulse 82 Pulse  Min: 47  Max: 82   Resp 20 Resp  Min: 14  Max: 33   /67 BP  Min: 95/59  Max: 140/69   O2 Sat 99 % SpO2  Min: 95 %  Max: 99 %      Intake/Output Summary (Last 24 hours) at 9/12/2023 3998  Last data filed at 9/12/2023 0601  Gross per 24 hour   Intake 1560 ml   Output --   Net 1560 ml         Diet Enteral/Parenteral; Tube Feeding No Oral Diet; Jevity 1.2 Scott; Continuous; 70; Prosource Protein Liquid - One Packet Daily; 200     Invasive Monitoring Physical exam    Physical Exam  Eyes:      Extraocular Movements: Extraocular movements intact. Pupils: Pupils are equal, round, and reactive to light. Skin:     General: Skin is warm and dry. HENT:      Head: Normocephalic. Mouth/Throat:      Mouth: Mucous membranes are moist.   Neck:     Trachea: Tracheostomy present. Cardiovascular:      Rate and Rhythm: Normal rate and regular rhythm. Pulses: Normal pulses. Abdominal:      Palpations: Abdomen is soft. Comments: Peg present   Constitutional:       General: He is sleeping. Appearance: He is obese. Pulmonary:      Effort: No respiratory distress. Breath sounds: No wheezing, rhonchi or rales. Psychiatric:         Mood and Affect: Affect is flat. Neurological:      Mental Status: He is easily aroused. He is calm. Sensory: Sensation is intact. Motor: Weakness. Diagnostic Studies      EKG: NSR with rate of 75  Imaging:  I have personally reviewed pertinent reports.        Medications:  Scheduled PRN   albuterol, 2.5 mg, Q6H  apixaban, 5 mg, BID  chlorhexidine, 15 mL, Q12H ISAEL  cholecalciferol, 1,000 Units, Daily  FLUoxetine, 10 mg, Daily  insulin lispro, 2-12 Units, Q6H ISAEL  omeprazole (PRILOSEC) suspension 2 mg/mL, 20 mg, Daily  sodium chloride, 4 mL, Q6H  thiamine, 100 mg, Daily      acetaminophen, 650 mg, Q4H PRN  fentanyl citrate (PF), 50 mcg, Q1H PRN  heparin (porcine), 10,000 Units, Q6H PRN  heparin (porcine), 5,000 Units, Q6H PRN  polyethylene glycol, 17 g, Daily PRN  senna, 17.6 mg, Daily PRN       Continuous          Labs:    CBC    No recent results  BMP    Recent Labs     09/11/23  0532 09/12/23  0529   SODIUM 144 145   K 3.4* 3.7   * 112*   CO2 27 28   AGAP 4 5   BUN 24 22   CREATININE 0.69 0.80   CALCIUM 9.0 8.7       Coags    Recent Labs     09/10/23  1151 09/11/23  0532   PTT 85* 23        Additional Electrolytes  Recent Labs     09/11/23  0532 09/12/23  0529   MG 2.4 2.1   PHOS 2.7  --           Blood Gas    No recent results  No recent results LFTs  No recent results    Infectious  No recent results  Glucose  Recent Labs     09/11/23  0532 09/12/23  0529   GLUC 119 [address] [facility]

## 2023-09-12 NOTE — PHYSICAL THERAPY NOTE
PT TREATMENT      09/12/23 1450   PT Last Visit   PT Visit Date 09/12/23   Note Type   Note Type Treatment   Pain Assessment   Pain Assessment Tool Choi-Baker FACES   Choi-Baker FACES Pain Rating 0   Restrictions/Precautions   Other Precautions Chair Alarm; Bed Alarm;Multiple lines;O2;Fall Risk  (Trach and PEG, vented)   General   Chart Reviewed Yes   Family/Caregiver Present No   Cognition   Arousal/Participation Cooperative   Subjective   Subjective Patient able to respond with yes no nodding due to nonverbal at this time with trach and vent: Patient indicates no pain and happy with ability to stand at this session   Bed Mobility   Additional Comments Patient seated in bedside chair upon arrival by therapist.  Patient has been transferred by ICU nursing staff via mechanical lift   Transfers   Sit to Stand 2  Maximal assistance   Additional items Assist x 2;Verbal cues   Stand to Sit 2  Maximal assistance   Additional items Assist x 2;Verbal cues   Additional Comments Patient able to demonstrate standing ability for 15 to 20 seconds x 2 with effective weightbearing through lower extremities and no knee buckling at this time. Patient fatigued with standing and required sitting rest time between activity   Balance   Static Sitting Fair -   Dynamic Sitting Poor +   Static Standing Poor   Dynamic Standing Poor -   Activity Tolerance   Activity Tolerance Patient limited by fatigue;Treatment limited secondary to medical complications (Comment)   Nurse Made Aware Yes   Exercises   Hamstring Stretch Sitting;5 reps;PROM; Bilateral   Hip Flexion Sitting;5 reps;Bilateral  (Alternating)   Hip Abduction Sitting;5 reps;Bilateral  (Alternating)   Knee AROM Long Arc Quad Sitting;5 reps;Bilateral  (Alternating)   Ankle Pumps Sitting;10 reps;Bilateral   Balance training  Sitting balance activity completed with weight shifting and leaning forward and backward also with completion of bilateral lower extremity exercise   Assessment Assessment Patient cooperative and motivated to increase functional mobility. Patient demonstrated happiness with smiles upon ability to stand and weight-bear with this session. Patient will benefit from continued physical therapy with progression as tolerated and increasing functional mobility with clinical staff as well. Patient will require postacute rehab services to recover function as prior to admission. The patient's -[facility] Basic Mobility Inpatient Short Form Raw Score is 7. A Raw score of less than or equal to 16 suggests the patient may benefit from discharge to post-acute rehabilitation services. Please also refer to the recommendation of the Physical Therapist for safe discharge planning. Plan   Treatment/Interventions ADL retraining;Functional transfer training;LE strengthening/ROM; Therapeutic exercise; Endurance training;Patient/family training;Equipment eval/education; Bed mobility;Gait training; Compensatory technique education   PT Frequency Other (Comment)  (5 times per week)   Recommendation   PT Discharge Recommendation Post acute rehabilitation services   -[facility] Basic Mobility Inpatient   Turning in Flat Bed Without Bedrails 2   Lying on Back to Sitting on Edge of Flat Bed Without Bedrails 1   Moving Bed to Chair 1   Standing Up From Chair Using Arms 1   Walk in Room 1   Climb 3-5 Stairs With Railing 1   Basic Mobility Inpatient Raw Score 7   Turning Head Towards Sound 4   Follow Simple Instructions 3   Low Function Basic Mobility Raw Score  14   Low Function Basic Mobility Standardized Score  22.01   Highest Level Of Mobility   JH-HLM Goal 2: Bed activities/Dependent transfer   JH-HLM Achieved 3: Sit at edge of bed  (sit at edge of chair without lumbar support)   Education   Patient Demonstrates acceptance/verbal understanding;Explanation/teachback used;Demonstrates verbal understanding   Licensure   [place] License Number  Hasmukh Abt, PT 4 0  [id number]

## 2023-09-12 NOTE — ASSESSMENT & PLAN NOTE
Patient presented with hypoxia 8/23, found to have right lower lobe segmental PE. Initially saturating well on nasal cannula. Rapid response was called 8/24 secondary to hypoxia and altered mental status requiring emergent intubation. Hypoxic post intubation requiring high ventilator settings. · Of note, was intubated during hospital stay at [facility] 8/12-8/13 for airway protection when acutely had GCS of 5  · CTA PE study 8/23: PE in the right lower lobe segmental pulmonary artery  · CXR 8/23 infiltrates  · Strep pneumo/legionella urine antigen negative  · COVID/Flu/RSV negative  · 8/25: Bronchoscopy: poorly tolerated secondary to hypoxia. Some mucus plugging present on the left. · 8/26: CT chest: Complete right lower lobe and near complete left lower lobe atelectasis. Pneumonia not excluded in the appropriate clinical setting.  Mild interstitial edema with trace effusions  · Received diuresis with IV Lasix   · 8/31 Completed Zosyn x 7 days   · 9/8 Trach placed  · 9/11 NIF -17    Plan:  · Continue PS weans   · Continue pulmonary hygiene   · Daily NIF's  · VAP ppx; chlorhexidine, PPI, HOB greater than 30 degrees  · LTACH planning

## 2023-09-13 LAB
ANION GAP SERPL CALCULATED.3IONS-SCNC: 6 MMOL/L
BUN SERPL-MCNC: 18 MG/DL (ref 5–25)
C-ANCA TITR SER IF: NORMAL TITER
CALCIUM SERPL-MCNC: 8.4 MG/DL (ref 8.4–10.2)
CHLORIDE SERPL-SCNC: 107 MMOL/L (ref 96–108)
CO2 SERPL-SCNC: 29 MMOL/L (ref 21–32)
CREAT SERPL-MCNC: 0.58 MG/DL (ref 0.6–1.3)
GFR SERPL CREATININE-BSD FRML MDRD: 131 ML/MIN/1.73SQ M
GLUCOSE SERPL-MCNC: 99 MG/DL (ref 65–140)
Lab: NORMAL
MISCELLANEOUS LAB TEST RESULT: NORMAL
MYELOPEROXIDASE AB SER IA-ACNC: NORMAL UNITS
P-ANCA ATYPICAL TITR SER IF: NORMAL TITER
P-ANCA TITR SER IF: NORMAL TITER
POTASSIUM SERPL-SCNC: 3.7 MMOL/L (ref 3.5–5.3)
PROTEINASE3 AB SER IA-ACNC: NORMAL UNITS
SODIUM SERPL-SCNC: 142 MMOL/L (ref 135–147)

## 2023-09-13 PROCEDURE — 94150 VITAL CAPACITY TEST: CPT

## 2023-09-13 PROCEDURE — 94003 VENT MGMT INPAT SUBQ DAY: CPT

## 2023-09-13 PROCEDURE — 94669 MECHANICAL CHEST WALL OSCILL: CPT

## 2023-09-13 PROCEDURE — 94640 AIRWAY INHALATION TREATMENT: CPT

## 2023-09-13 PROCEDURE — 97110 THERAPEUTIC EXERCISES: CPT

## 2023-09-13 PROCEDURE — 36600 WITHDRAWAL OF ARTERIAL BLOOD: CPT

## 2023-09-13 PROCEDURE — NC001 PR NO CHARGE: Performed by: STUDENT IN AN ORGANIZED HEALTH CARE EDUCATION/TRAINING PROGRAM

## 2023-09-13 PROCEDURE — 80048 BASIC METABOLIC PNL TOTAL CA: CPT | Performed by: NURSE PRACTITIONER

## 2023-09-13 PROCEDURE — 94668 MNPJ CHEST WALL SBSQ: CPT

## 2023-09-13 PROCEDURE — 94760 N-INVAS EAR/PLS OXIMETRY 1: CPT

## 2023-09-13 PROCEDURE — 99232 SBSQ HOSP IP/OBS MODERATE 35: CPT | Performed by: STUDENT IN AN ORGANIZED HEALTH CARE EDUCATION/TRAINING PROGRAM

## 2023-09-13 RX ORDER — POTASSIUM CHLORIDE 20 MEQ/1
40 TABLET, EXTENDED RELEASE ORAL ONCE
Status: COMPLETED | OUTPATIENT
Start: 2023-09-13 | End: 2023-09-13

## 2023-09-13 RX ADMIN — SODIUM CHLORIDE SOLN NEBU 3% 4 ML: 3 NEBU SOLN at 07:12

## 2023-09-13 RX ADMIN — SODIUM CHLORIDE SOLN NEBU 3% 4 ML: 3 NEBU SOLN at 02:06

## 2023-09-13 RX ADMIN — Medication 20 MG: at 08:09

## 2023-09-13 RX ADMIN — ALBUTEROL SULFATE 2.5 MG: 2.5 SOLUTION RESPIRATORY (INHALATION) at 07:12

## 2023-09-13 RX ADMIN — ALBUTEROL SULFATE 2.5 MG: 2.5 SOLUTION RESPIRATORY (INHALATION) at 02:06

## 2023-09-13 RX ADMIN — SODIUM CHLORIDE SOLN NEBU 3% 4 ML: 3 NEBU SOLN at 13:23

## 2023-09-13 RX ADMIN — ALBUTEROL SULFATE 2.5 MG: 2.5 SOLUTION RESPIRATORY (INHALATION) at 13:23

## 2023-09-13 RX ADMIN — Medication 1000 UNITS: at 08:08

## 2023-09-13 RX ADMIN — ALBUTEROL SULFATE 2.5 MG: 2.5 SOLUTION RESPIRATORY (INHALATION) at 20:13

## 2023-09-13 RX ADMIN — CHLORHEXIDINE GLUCONATE 15 ML: 1.2 RINSE ORAL at 20:03

## 2023-09-13 RX ADMIN — THIAMINE HCL TAB 100 MG 100 MG: 100 TAB at 08:08

## 2023-09-13 RX ADMIN — FLUOXETINE 10 MG: 10 CAPSULE ORAL at 08:08

## 2023-09-13 RX ADMIN — SODIUM CHLORIDE SOLN NEBU 3% 4 ML: 3 NEBU SOLN at 20:13

## 2023-09-13 RX ADMIN — POTASSIUM CHLORIDE 40 MEQ: 1500 TABLET, EXTENDED RELEASE ORAL at 10:09

## 2023-09-13 RX ADMIN — CHLORHEXIDINE GLUCONATE 15 ML: 1.2 RINSE ORAL at 08:08

## 2023-09-13 RX ADMIN — APIXABAN 5 MG: 5 TABLET, FILM COATED ORAL at 08:08

## 2023-09-13 RX ADMIN — APIXABAN 5 MG: 5 TABLET, FILM COATED ORAL at 17:46

## 2023-09-13 NOTE — ASSESSMENT & PLAN NOTE
Patient initially presented to [address] [place] [address] on 8/23 with hypoxia and encephalopathy. Acute change in GCS prompted rapid response and subsequent intubation for airway protection. Of note, pt w/ recent hospitalization to [facility] 8/11-8/17 for encephalopathy with unclear etiology despite extensive workup but Wernicke's encephalopathy was on the differential.    ·  [place] workup 8/11:  · Rapid response and intubation on 8/12 for airway protection, extubated on 8/13  · CT head 8/11: No acute intracranial abnormality. Stable small bilateral subcortical hypoattenuating foci. No associated mass effect. · MRI 8/12: No acute intracranial abnormality. Stable nonspecific enhancement along the anterior floor of the third ventricle/hypothalamus compared to March 2023 study. Findings below:  · VEEG 8/12: negative for seizure activity. Possible findings relating to underlying sleep disorder   · LP: Grossly negative overall  · DSPO: Treated with high dose thiamine. Psych evaluated, possible bipolar disorder. Discharged to home with his sister with visiting nurses, ST/PT/OT  · [facility] 8/23: Presented with hypoxia found by visiting nurse SPO2 in 80's  · 8/24: RRT > intubated for airway protection   · [facility] to [facility] on 8/24  · [facility] 8/24 (Current hospital course)  · CT head 8/24: No acute intracranial abnormality  · 8/25 EEG: No evidence of electrographic seizures. Mild background slowing suggestive of underlying cerebral dysfunction from toxic/metablic/infectious/hypoxic encephalopathy. Clinical correlation is recommended  · On neurological exam: wakes to voice, follows 2 step commands   · Continues to have global weakness with poor NIF's unable to wean from ventilator   · Ach binding and blocking Ab negative   · MUSK Ab and ACHR modulating Ab neg  · Serum anti-TPO and DARIN Ab's, RPR, neg.  CSF paraneoplastic panel neg  · Varicella PCR neg  · Toxoplasma antibodies negative  · Rheum labs negative  · 9/6 repeat LP cultures negative   · 9/7 Completed 7 days of Ceftriaxone to cover for possible neurosyphillis  · MRI 9/9: No acute intracranial pathology. Unremarkable MRI of the orbits. Improving enhancement of the hypothalamus. 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   · 9/10 completed 3 days pulse dose steroids  · 9/11 completed 5 days IVIG     Plan:  · Neurology following  · VGCC serum test pending, Hypocretin CSF pending   · Continue thiamine  · NIF daily   · Outpt EMG in 2-3 weeks  · Neurochecks per routine

## 2023-09-13 NOTE — CASE MANAGEMENT
Case Management Discharge Planning Note    Patient name Nino Burk  Location ICU 01/ICU 01 MRN [id number]  : 1985 Date 2023       Current Admission Date: 2023  Current Admission Diagnosis:Acute respiratory failure with hypoxia [facility])   Patient Active Problem List    Diagnosis Date Noted   • S/P percutaneous endoscopic gastrostomy (PEG) tube placement ([address]) 2023   • Anxiety 2023   • Acute respiratory failure with hypoxia ([address]) 2023   • Acute pulmonary embolism without acute cor pulmonale ([address]) 2023   • History of Wernicke's encephalopathy 2023   • Depression 2023   • Neuromuscular weakness ([address]) 2023   • Testicular cancer ([address]) 2023   • History of LVH (left ventricular hypertrophy) 2023   • Pure hypertriglyceridemia 2023   • Encephalopathy 2023   • Unilateral vestibular schwannoma ([address]) 2023   • Port-A-Cath in place 2023   • Diplopia 2023   • 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   • Urinary hesitancy    • Umbilical hernia without obstruction and without gangrene 12/10/2022   • Tobacco use 12/10/2022   • Retroperitoneal lymphadenopathy 12/10/2022   • Hypertension 12/10/2022      LOS (days): 20  Geometric Mean LOS (GMLOS) (days):   Days to GMLOS:     OBJECTIVE:  Risk of Unplanned Readmission Score: 30.74     Current admission status: Inpatient   Preferred Pharmacy:   [id number] [place], [address]  Phone: [phone] Fax: [phone]    Primary Care Provider: Estuardo Larios MD    Primary Insurance: [facility]  Secondary Insurance:     DISCHARGE DETAILS:    Discharge planning discussed with[de-identified] Pt, Colettelenardyonatan Laranisa (mother), Brittany Molina (sister), Yamila Triplett (SO), Joey León (aunt) and cousin  Freedom of Choice: Yes  Comments - Freedom of Choice: SW following to assist with DCP.   Vent rehab placement is being recommended by ICU care team.  SW met with family to review recommendations and plans. SW talked with pt and family about vent rehab and reason for recommendation, enable pt to regain strength and wean from ventilator. At this time the subacute facilities that are considering pt are in the [place] area. Family expressed concern about distance and inability to visit with pt. Family also wanted to discuss plans to have pt return home under their care. Pt's sister and aunt would be pt's main caregivers. Pt's aunt said she has cared for pt's with similar needs to pt in the past.  Per family pt has hospital bed, walker and commode at home already and had been receiving home care services through [address]. Talked with family about additional DME needs if discharge home is planned, ie: trach supplies, tube feed, ventilator, [employer]. SW offered to continue to explore vent rehab placement and try to locate facility closer to family. Family agreeable. SW requested updated copy of pt's insurance card. Per SO pt has received new card since copy received in January. SO said she would bring new card for SW to copy. SW will continue to follow to assist with planning. CM contacted family/caregiver?: Yes  Were Treatment Team discharge recommendations reviewed with patient/caregiver?: Yes  Did patient/caregiver verbalize understanding of patient care needs?: Yes  Were patient/caregiver advised of the risks associated with not following Treatment Team discharge recommendations?: Yes    Contacts  Patient Contacts: Jeris Lenz and Corinne Cordial  Relationship to Patient[de-identified] Family (mother and sister)  Contact Method:  In Person  Reason/Outcome: Discharge Planning, Continuity of [address]         Is the patient interested in [address] at discharge?: Yes  [facility] requested[de-identified] [address], Nursing, Occupational Therapy, [address] Name[de-identified] [facility] [address] Provider[de-identified] PCP  Home Health Services Needed[de-identified] Evaluate Functional Status and Safety, Post-Op Care and Assessment, Strengthening/Theraputic Exercises to Improve Function, Other (comment) (trach and peg tube care, ventilator care)  Homebound Criteria Met[de-identified] Requires Medical Transportation  Supporting Clincal Findings[de-identified] Bed Bound or Wheelchair Bound    DME Referral Provided  Referral made for DME?:  (pending family decision to return home)    Other Referral/Resources/Interventions Provided:  Interventions: DME, HHC, Short Term Rehab, Other (Specify) (vent rehab)  Referral Comments: Updated clinical sent to subacute facilities reviewing referral.  Follow up phone calls also made, messages left. SW will continue to follow to assist with planning based on family's final decision on discharge plan.     Treatment Team Recommendation: Short Term Rehab, Other (vent rehab)  Discharge Destination Plan[de-identified] Home with [address], Short Term Rehab, Other (pending family decision)  Transport at Discharge : [facility]

## 2023-09-13 NOTE — PROGRESS NOTES
Critical Care Attending Note; Alannah Masterson   Note Date: 23  Note Time: 9:30 AM    Patient: Venessa Rodriguez  Age, : 41 y.o., 1985 MRN: [id number] Code Status: Level 1 - Full Code Patient Location: ICU 01/ICU 01   Hospital LOS:20 days  ]   Patient seen and examined, medical record reviewed, discussed with house staff and nursing staff.      HPI   CC: AMS  38M with a PMH of Met L Testicular Seminoma(s/p orchiectomy/chemotherapy), HFpEF, HTN, Obesity, OHS, remote meth/EtOH abuse and depression and recent admission  for AMS(Concerning for Wernickes vs Narcolepsy) who represents with AMS, weakness,  hypoxia reqruiring intubation    Key Recent Events   : Admitted, Intubated  : Trach/PEG, Pulse dose steroids, IVIG     Main ICU Plans:       #Neuro  Weakness  - Unclear Etiology - Negative workup so far(- Ach, Normal CSF protein, paraneoplastic panel)   - IVIG Completed   - Completed Pulse dose steroid   - Neurology Consulted - Appreciate recs  - Follow up rheumatic workup, CSF  - PT/OT   - EMG outpatient  - Continue thiamine    #Card  Pulmonary Embolism   - Eloquis    #Pulm  Acute Respiratory Failure - NIF Daily ~ [id number] - low likelyhood he will be able to wean at this time  - AC/VC TV 500ml - PST daily  - Wean FiO2 - Maintain O2 Sat >90%  - Trach Care/Vap Bundle  - Increase Pulm toilet regimen - Hypertonic Saline, Albuterol, Chest PT      #Endo  Hyperglycemic S/t Steroid - resolved      #DVT/GI ppx  -Apixban    #Lines/Tubes/Drains:   Peripheral IV 23 Right Antecubital (Active)   Number of days: 2       #Nutrition:   Diet Enteral/Parenteral; Tube Feeding No Oral Diet; Jevity 1.2 Scott; Continuous; 70; Prosource Protein Liquid - One Packet Daily; 200        #Code Status:   Level 1 - Full Code    #Dispo:   Yenifer Lancaster MD  Pulmonary, Critical Care    Critical care time, excluding procedures, teaching, family meetings, and excludes any prior time recorded by the AP/resident, 35 minutes. Upon my evaluation, this patient has a high probability of imminent or life-threatening deterioration due to above problems which required my direct attention, intervention, and personal management. Impression/Active Problems:    Testicular Seminoma   HFpEF   HTN   Obesity   OHS   Wernickes encephalopathy   Myopathy   Chronic Respiratory Failure   Increased Secretions    Physical Exam:     Vital Signs:   Weight: 135 kg (297 lb 9.9 oz)  IBW: Ideal body weight: 86.8 kg (191 lb 5.7 oz)  Adjusted ideal body weight: 106 kg (233 lb 13.8 oz)  Temp:  [98.9 °F (37.2 °C)-100.5 °F (38.1 °C)] 98.9 °F (37.2 °C)  HR:  [65-84] 77  Resp:  [16-41] 21  BP: (101-129)/(59-80) 127/73  FiO2 (%):  [40] 40  General: NAD  Neuro: Alert, follows commands, 3-4/5 UE weaknes  Heart: RRR  Lungs: Basilar crackles  Abdomen: Soft NT  Extremities: No Edema                Ventilator Settings:     Vent Mode: AC/VC  FiO2 (%):  [40] 40          Invalid input(s): "PCO2", "O2"  Radiologic Images Reviewed:    MRI Brain  No acute intracranial pathology. Unremarkable MRI of the orbits. Improving enhancement of the hypothalamus. 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 CXR  Improved aeration of the bilateral lungs with mild bibasilar atelectasis.      Input / Output:       Intake/Output Summary (Last 24 hours) at 9/13/2023 0930  Last data filed at 9/13/2023 0400  Gross per 24 hour   Intake 1560 ml   Output --   Net 1560 ml            Infusions:     Scheduled Medications:  Current Facility-Administered Medications   Medication Dose Route Frequency Provider Last Rate   • acetaminophen  650 mg Oral Q4H PRN AMBER Cruz     • albuterol  2.5 mg Nebulization Q6H AMBER Cruz     • apixaban  5 mg Oral BID AMBER Hawkins     • chlorhexidine  15 mL Mouth/Throat Q12H 1545 AMBER Cerrato     • cholecalciferol  1,000 Units Oral Daily Pinxia Burton Delgadillo MD     • FLUoxetine  10 mg Oral Daily AMBER Encarnacion     • omeprazole (PRILOSEC) suspension 2 mg/mL  20 mg Oral Daily AMBER Encarnacion     • polyethylene glycol  17 g Oral Daily PRN AMBER Encarnacion     • potassium chloride  40 mEq Oral Once Angelita Collet, MD     • senna  17.6 mg Oral Daily PRN AMBER Encarnacion     • sodium chloride  4 mL Nebulization Q6H AMBER Encarnacion     • thiamine  100 mg Oral Daily AMBER Encarnacion         PRN Medications:  •  acetaminophen  •  polyethylene glycol  •  senna    Labs Reviewed:  Results from last 7 days   Lab Units 09/09/23  0606   WBC Thousand/uL 5.57   HEMOGLOBIN g/dL 10.8*   HEMATOCRIT % 34.2*   PLATELETS Thousands/uL 234      Results from last 7 days   Lab Units 09/13/23  0535 09/12/23  0529 09/11/23  0532 09/09/23  0606   SODIUM mmol/L 142 145 144 148*   CO2 mmol/L 29 28 27 27   BUN mg/dL 18 22 24 16   CALCIUM mg/dL 8.4 8.7 9.0 9.2   MAGNESIUM mg/dL  --  2.1 2.4 2.4   PHOSPHORUS mg/dL  --   --  2.7 3.5         Invalid input(s): "ASTSGOT", "ALTSGPT"LABRCNTIP@ ,alkphos:3,tbilirubin:3,dbilirubin:3)@      Invalid input(s): "TROPT", "CPK", "PBNP"             I have personally seen and examined the patient on (09/13/23 between 9860-1599). I discussed the patient with the AP/resident including, but not limited to, verifying findings; reviewing labs and x-rays; discussing with consultants; developing the plan of care with the bedside nurse; and discussing treatment plan with patient or surrogate. I have reviewed the note and assessment performed by the AP/resident and agree with the AP/resident’s documented findings and plan of care with the above additions/exceptions. Please see my comments for details and adjustments.

## 2023-09-13 NOTE — PROGRESS NOTES
[id number] [facility]  Progress Note  Name: Jorje Conklin  MRN: [id number]  Unit/Bed#: ICU 01 I Date of Admission: 8/24/2023   Date of Service: 9/13/2023 I Hospital Day: 20    Assessment/Plan   * Acute respiratory failure with hypoxia [facility])  Assessment & Plan  Patient presented with hypoxia 8/23, found to have right lower lobe segmental PE. Initially saturating well on nasal cannula. Rapid response was called 8/24 secondary to hypoxia and altered mental status requiring emergent intubation. Hypoxic post intubation requiring high ventilator settings. · Of note, was intubated during hospital stay at [facility] 8/12-8/13 for airway protection when acutely had GCS of 5  · CTA PE study 8/23: PE in the right lower lobe segmental pulmonary artery  · CXR 8/23 infiltrates  · Strep pneumo/legionella urine antigen negative  · COVID/Flu/RSV negative  · 8/25: Bronchoscopy: poorly tolerated secondary to hypoxia. Some mucus plugging present on the left. · 8/26: CT chest: Complete right lower lobe and near complete left lower lobe atelectasis. Pneumonia not excluded in the appropriate clinical setting. Mild interstitial edema with trace effusions  · Received diuresis with IV Lasix   · 8/31 Completed Zosyn x 7 days   · 9/8 Trach placed  · 9/11 NIF -17    Plan:  · Continue PS weans   · Continue pulmonary hygiene   · Daily NIF's  · VAP ppx; chlorhexidine, PPI, HOB greater than 30 degrees  · LTACH planning    Neuromuscular weakness [facility])  Assessment & Plan  Patient initially presented to [facility] on 8/23 with hypoxia and encephalopathy. Acute change in GCS prompted rapid response and subsequent intubation for airway protection.  Of note, pt w/ recent hospitalization to [facility] 8/11-8/17 for encephalopathy with unclear etiology despite extensive workup but Wernicke's encephalopathy was on the differential.    · [facility] [place] workup 8/11:  · Rapid response and intubation on 8/12 for airway protection, extubated on 8/13  · CT head 8/11: No acute intracranial abnormality. Stable small bilateral subcortical hypoattenuating foci. No associated mass effect. · MRI 8/12: No acute intracranial abnormality. Stable nonspecific enhancement along the anterior floor of the third ventricle/hypothalamus compared to March 2023 study. Findings below:  · VEEG 8/12: negative for seizure activity. Possible findings relating to underlying sleep disorder   · LP: Grossly negative overall  · DSPO: Treated with high dose thiamine. Psych evaluated, possible bipolar disorder. Discharged to home with his sister with visiting nurses, ST/PT/OT  · [facility] [place] [facility] 8/23: Presented with hypoxia found by visiting nurse SPO2 in 80's  · 8/24: RRT > intubated for airway protection   · Transferred to [facility] ([place], [place]) on 8/24  · [facility] 8/24 (Current hospital course)  · CT head 8/24: No acute intracranial abnormality  · 8/25 EEG: No evidence of electrographic seizures. Mild background slowing suggestive of underlying cerebral dysfunction from toxic/metablic/infectious/hypoxic encephalopathy. Clinical correlation is recommended  · On neurological exam: wakes to voice, follows 2 step commands   · Continues to have global weakness with poor NIF's unable to wean from ventilator   · Ach binding and blocking Ab negative   · MUSK Ab and ACHR modulating Ab neg  · Serum anti-TPO and DARIN Ab's, RPR, neg. CSF paraneoplastic panel neg  · Varicella PCR neg  · Toxoplasma antibodies negative  · Rheum labs negative  · 9/6 repeat LP cultures negative   · 9/7 Completed 7 days of Ceftriaxone to cover for possible neurosyphillis  · MRI 9/9: No acute intracranial pathology. Unremarkable MRI of the orbits. Improving enhancement of the hypothalamus. 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   · 9/10 completed 3 days pulse dose steroids  · 9/11 completed 5 days IVIG     Plan:  · Neurology following  · VGCC serum test pending, Hypocretin CSF pending   · Continue thiamine  · NIF daily   · Outpt EMG in 2-3 weeks  · Neurochecks per routine     Acute pulmonary embolism without acute cor pulmonale [facility])  Assessment & Plan  Presented 8/24 with hypoxia, initially admitted to the floor on supplemental NC  · CTA PE: Pulmonary embolism in the right lower lobe segmental pulmonary artery. Measured RV/LV ratio was within normal limits at less than 0.9. · BNP 9, initial HS troponin 9  · Initiated on heparin gtt, transitioned to therapeutic Lovenox on 8/26  · Echo completed 8/25: Left Ventricle: Left ventricular cavity size is normal. Wall thickness is normal. The left ventricular ejection fraction is 60% by visual estimation. . Wall motion is normal. Diastolic function is normal for age. Right Ventricle: Right ventricular cavity size is normal. Systolic function is normal. Normal tricuspid annular plane systolic excursion (TAPSE) > 1.7 cm. · Dopplers of LE neg for DVT bilaterally    Plan:  · Continue Eliquis     History of LVH (left ventricular hypertrophy)  Assessment & Plan  Previous echocardiogram 6/2/23 showed EF 27%, normal diastolic dysfunction, mild left ventricular hypertrophy, mild RVSP elevation and mild TR  · PTA Med: Torsemide 20 mg daily  · Evaluated by Heart Failure team in July 2023. LVH thought to be secondary to hypertension, obesity, hx of meth use  · Recommended sleep study at that time   · Echo this admission shows normal EF of 60%, normal LV thickness, no diastolic dysfunction    Depression  Assessment & Plan  · Fluoxetine 10 mg daily  · Consider additional psych consult         Seminoma of left testis [facility])  Assessment & Plan  · Testicular cancer s/p left orchiectomy on 12/2022  · Repeat CT scan in January 2023 with enlarging lymph node.   · Began Chemo in March 2023: Etoposide and Cisplatin with plan for 4 cycles, 5 days every 21 days  · Did not complete chemo treatment due to adverse effects  · Stopped Cisplatin after 1 cycle due to painful neuropathy  · Continued Etoposide 2nd cycle  · Resumed Cisplatin and Etoposide 3rd cycle  · After 3rd cycle reported double vision, labile affect and right sided weakness and therefore chemotherapy was stopped on 5/26/23  · Continue outpatient follow up with Heme/Onc     Hypertension  Assessment & Plan  Hx of hypertension. PTA was on Torsemide 20 mg daily  · Patient has been normotensive  · Remains off anti-hypertensives     Umbilical hernia without obstruction and without gangrene  Assessment & Plan  · Umbilical hernia present, easily reducible     S/P percutaneous endoscopic gastrostomy (PEG) tube placement [facility])  Assessment & Plan  · Continue tube feeds at goal            ICU Core Measures     Vented Patient  VAP Bundle  VAP bundle ordered     A: Assess, Prevent, and Manage Pain · Has pain been assessed? Yes  · Need for changes to pain regimen? No   B: Both Spontaneous Awakening Trials (SATs) and Spontaneous Breathing Trials (SBTs) · Plan to perform spontaneous awakening trial today? N/A   · Plan to perform spontaneous breathing trial today? Yes   · Obvious barriers to extubation? Yes   C: Choice of Sedation · RASS Goal: 0 Alert and Calm  · Need for changes to sedation or analgesia regimen? NA   D: Delirium · CAM-ICU: Negative   E: Early Mobility  · Plan for early mobility? Yes   F: Family Engagement · Plan for family engagement today? Yes       Review of Invasive Devices:      Prophylaxis:  VTE VTE covered by:  apixaban, Oral, 5 mg at 09/13/23 0808       Stress Ulcer  covered byomeprazole (PRILOSEC) suspension 2 mg/mL [[id number]]       Subjective   HPI/24hr events: Patient seen and examined at bedside. V/s stable. Hypokalemia repleted. No changes in vent settings overnight. Patient's presentation grossly unchanged on examination. LTAC was denied per CM. Pending other referrals.     Review of Systems   Unable to perform ROS: Acuity of condition        Objective                            Vitals I/O      Most Recent Min/Max in 24hrs Temp 98.9 °F (37.2 °C) Temp  Min: 98.9 °F (37.2 °C)  Max: 100.5 °F (38.1 °C)   Pulse 77 Pulse  Min: 65  Max: 84   Resp 21 Resp  Min: 16  Max: 41   /73 BP  Min: 101/62  Max: 129/80   O2 Sat 98 % SpO2  Min: 97 %  Max: 100 %      Intake/Output Summary (Last 24 hours) at 9/13/2023 2600  Last data filed at 9/13/2023 0400  Gross per 24 hour   Intake 1560 ml   Output --   Net 1560 ml         Diet Enteral/Parenteral; Tube Feeding No Oral Diet; Jevity 1.2 Scott; Continuous; 70; Prosource Protein Liquid - One Packet Daily; 200     Invasive Monitoring Physical exam    Physical Exam  Skin:     General: Skin is warm and dry. HENT:      Mouth/Throat:      Mouth: Mucous membranes are moist.   Cardiovascular:      Rate and Rhythm: Normal rate and regular rhythm. Heart sounds: Normal heart sounds. Musculoskeletal:      Right lower leg: No edema. Left lower leg: No edema. Abdominal:      Palpations: Abdomen is soft. Tenderness: There is no guarding. Hernia: A hernia is present. Comments: PEG tube present   Constitutional:       General: He is not in acute distress. Appearance: He is not toxic-appearing. Pulmonary:      Breath sounds: Rhonchi present. Comments: Jigna Disla present. On Vent  Neurological:      Mental Status: He is alert. He is calm. Comments: Moving extremities with slow weak movements   Genitourinary/Anorectal:  external catheter         Diagnostic Studies      Imaging:  I have personally reviewed pertinent reports.        Medications:  Scheduled PRN   albuterol, 2.5 mg, Q6H  apixaban, 5 mg, BID  chlorhexidine, 15 mL, Q12H ISAEL  cholecalciferol, 1,000 Units, Daily  FLUoxetine, 10 mg, Daily  omeprazole (PRILOSEC) suspension 2 mg/mL, 20 mg, Daily  potassium chloride, 40 mEq, Once  sodium chloride, 4 mL, Q6H  thiamine, 100 mg, Daily      acetaminophen, 650 mg, Q4H PRN  polyethylene glycol, 17 g, Daily PRN  senna, 17.6 mg, Daily PRN       Continuous          Labs:    CBC    No recent results  BMP    Recent Labs     09/12/23  0529 09/13/23  0535   SODIUM 145 142   K 3.7 3.7   * 107   CO2 28 29   AGAP 5 6   BUN 22 18   CREATININE 0.80 0.58*   CALCIUM 8.7 8.4       Coags    No recent results     Additional Electrolytes  Recent Labs     09/12/23  0529   MG 2.1          Blood Gas    No recent results  No recent results LFTs  No recent results    Infectious  No recent results  Glucose  Recent Labs     09/12/23  0529 09/13/23  0535   GLUC 89 99             Stephane Montgomery MD

## 2023-09-13 NOTE — PHYSICAL THERAPY NOTE
PT TREATMENT     09/13/23 1030   PT Last Visit   PT Visit Date 09/13/23   Note Type   Note type Re-Evaluation   Pain Assessment   Pain Assessment Tool Choi-Baker FACES   Choi-Baker FACES Pain Rating 0   Restrictions/Precautions   Other Precautions Chair Alarm; Bed Alarm;Multiple lines;O2;Fall Risk  (Trached on vent and PEG tube)   RLE Assessment   RLE Assessment   (Range of motion within functional limits, strength 3+/5)   LLE Assessment   LLE Assessment   (Range of motion within functional limits, strength 3+/5)   Bed Mobility   Supine to Sit 2  Maximal assistance   Sit to Supine 2  Maximal assistance   Additional Comments Patient being lifted to bedside chair with a mechanical lift at this time although able to sit on chair edge without lumbar support with min to mod assist.   Transfers   Sit to Stand 2  Maximal assistance   Additional items Assist x 2   Stand to Sit 2  Maximal assistance   Additional items Assist x 2   Additional Comments Patient able to effectively weight-bear on lower extremities without knees buckling although with decreased endurance and generalized weakness with inability to take steps at this time therefore gait not able to be fully assessed   Balance   Static Sitting Fair -   Dynamic Sitting Poor +   Static Standing Poor   Dynamic Standing Poor -   Activity Tolerance   Activity Tolerance Patient limited by fatigue;Treatment limited secondary to medical complications (Comment)  (Limited by generalized weakness)   Nurse Made Aware Yes   Assessment   Assessment Patient seen for lower extremity exercise and reevaluation. Patient now with trach and remains on vent, also with PEG tube in place. Patient able to follow all commands and demonstrating ability to stand and weight-bear. Patient will benefit from continued physical therapy with progression as tolerated and postacute rehab services to recover function. The patient's AM-PAC Basic Mobility Inpatient Short Form Raw Score is 7.  A Raw score of less than or equal to 16 suggests the patient may benefit from discharge to post-acute rehabilitation services. Please also refer to the recommendation of the Physical Therapist for safe discharge planning. Goals   STG Expiration Date 09/20/23   Short Term Goal #1 Transfers and gait with roller walker with mod assist of 2   Short Term Goal #2 Gait endurance to 10 feet, strength bilateral lower extremities 3+/4 -   LTG Expiration Date 09/27/23   Long Term Goal #1 Transfers and gait with roller walker with min assist of 2   Long Term Goal #2 Gait endurance to 25 feet, follow-up with postacute rehab services to recover function and independence   Plan   Treatment/Interventions ADL retraining;Functional transfer training;LE strengthening/ROM; Therapeutic exercise; Endurance training;Patient/family training;Equipment eval/education; Bed mobility;Gait training; Compensatory technique education   PT Frequency Other (Comment)  (5 times per week)   Recommendation   PT Discharge Recommendation Post acute rehabilitation services   AM-PAC Basic Mobility Inpatient   Turning in Flat Bed Without Bedrails 2   Lying on Back to Sitting on Edge of Flat Bed Without Bedrails 1   Moving Bed to Chair 1   Standing Up From Chair Using Arms 1   Walk in Room 1   Climb 3-5 Stairs With Railing 1   Basic Mobility Inpatient Raw Score 7   Turning Head Towards Sound 4   Follow Simple Instructions 3   Low Function Basic Mobility Raw Score  14   Low Function Basic Mobility Standardized Score  22.01   Highest Level Of Mobility   JH-HLM Goal 2: Bed activities/Dependent transfer   JH-HLM Achieved 2: Bed activities/Dependent transfer   Barthel Index   Feeding 0   Bathing 0   Grooming Score 0   Dressing Score 0   Bladder Score 0   Bowels Score 5   Toilet Use Score 5   Transfers (Bed/Chair) Score 5   Mobility (Level Surface) Score 0   Stairs Score 0   Barthel Index Score 15   Exercises   Hamstring Stretch Sitting;5 reps;PROM; Bilateral Heelslides Sitting;10 reps;AAROM;AROM; Bilateral   Hip Flexion Sitting;10 reps;AAROM;AROM; Bilateral   Hip Abduction Sitting;10 reps;AAROM;AROM; Bilateral   Knee AROM Short Arc Quad Sitting;5 reps;Bilateral   Knee AROM Long Arc Quad Sitting;10 reps;AAROM;AROM; Bilateral   Ankle Pumps Sitting;20 reps;Bilateral   Balance training  Sitting balance activity completed with weight shifting and lower extremity exercise without lumbar support   Licensure   [place] License Number  Samy Castellon, PT 4 0  [id number]

## 2023-09-13 NOTE — ASSESSMENT & PLAN NOTE
Previous echocardiogram 6/2/23 showed EF 44%, normal diastolic dysfunction, mild left ventricular hypertrophy, mild RVSP elevation and mild TR  · PTA Med: Torsemide 20 mg daily  · Evaluated by Heart Failure team in July 2023.  LVH thought to be secondary to hypertension, obesity, hx of meth use  · Recommended sleep study at that time   · Echo this admission shows normal EF of 60%, normal LV thickness, no diastolic dysfunction

## 2023-09-13 NOTE — ASSESSMENT & PLAN NOTE
Patient presented with hypoxia 8/23, found to have right lower lobe segmental PE. Initially saturating well on nasal cannula. Rapid response was called 8/24 secondary to hypoxia and altered mental status requiring emergent intubation. Hypoxic post intubation requiring high ventilator settings. · Of note, was intubated during hospital stay at [facility] 8/12-8/13 for airway protection when acutely had GCS of 5  · CTA PE study 8/23: PE in the right lower lobe segmental pulmonary artery  · CXR 8/23 infiltrates  · Strep pneumo/legionella urine antigen negative  · COVID/Flu/RSV negative  · 8/25: Bronchoscopy: poorly tolerated secondary to hypoxia. Some mucus plugging present on the left. · 8/26: CT chest: Complete right lower lobe and near complete left lower lobe atelectasis. Pneumonia not excluded in the appropriate clinical setting.  Mild interstitial edema with trace effusions  · Received diuresis with IV Lasix   · 8/31 Completed Zosyn x 7 days   · 9/8 Trach placed  · 9/11 NIF -17    Plan:  · Continue PS weans   · Continue pulmonary hygiene   · Daily NIF's  · VAP ppx; chlorhexidine, PPI, HOB greater than 30 degrees  · LTACH planning

## 2023-09-13 NOTE — UTILIZATION REVIEW
Continued Stay Review    Date: 9/13/23                          Current Patient Class: inpatient  Current Level of Care: ICU    HPI:38 y.o. male initially admitted on 8/24/23     Assessment/Plan:   Remains intubated, vent settings unchanged. Trach & PEG in place. Tube feedings in progress. Lungs with rhonchi, able to move all extremities with slow, weak movements.        Vital Signs:   Diet Enteral/Parenteral; Tube Feeding No Oral Diet; Jevity 1.2 Scott; Continuous; 70; Prosource Protein Liquid - One Packet Daily; 200    Pertinent Labs/Diagnostic Results:   Results from last 7 days   Lab Units 09/09/23  0606   WBC Thousand/uL 5.57   HEMOGLOBIN g/dL 10.8*   HEMATOCRIT % 34.2*   PLATELETS Thousands/uL 234   NEUTROS ABS Thousands/µL 4.79     Results from last 7 days   Lab Units 09/13/23  0535 09/12/23  0529 09/11/23  0532 09/09/23  0606   SODIUM mmol/L 142 145 144 148*   POTASSIUM mmol/L 3.7 3.7 3.4* 3.6   CHLORIDE mmol/L 107 112* 113* 113*   CO2 mmol/L 29 28 27 27   ANION GAP mmol/L 6 5 4 8   BUN mg/dL 18 22 24 16   CREATININE mg/dL 0.58* 0.80 0.69 0.68   EGFR ml/min/1.73sq m 131 114 122 122   CALCIUM mg/dL 8.4 8.7 9.0 9.2   CALCIUM, IONIZED mmol/L  --   --   --  1.12   MAGNESIUM mg/dL  --  2.1 2.4 2.4   PHOSPHORUS mg/dL  --   --  2.7 3.5       Results from last 7 days   Lab Units 09/12/23  0530 09/11/23  2331 09/11/23  1719 09/11/23  1121 09/11/23  0531 09/10/23  2349 09/10/23  1717 09/10/23  1140 09/10/23  0535 09/09/23  2351 09/09/23  1723 09/09/23  1200   POC GLUCOSE mg/dl 91 99 104 116 120 175* 141* 122 173* 141* 153* 126     Results from last 7 days   Lab Units 09/13/23  0535 09/12/23  0529 09/11/23  0532 09/09/23  0606   GLUCOSE RANDOM mg/dL 99 89 119 132     Results from last 7 days   Lab Units 09/07/23  1435   CK TOTAL U/L 71     Results from last 7 days   Lab Units 09/11/23  0532 09/10/23  1151 09/10/23  0536   PTT seconds 23 85* 90*     Medications:   Scheduled Medications:  albuterol, 2.5 mg, Nebulization, Q6H  apixaban, 5 mg, Oral, BID  chlorhexidine, 15 mL, Mouth/Throat, Q12H ISAEL  cholecalciferol, 1,000 Units, Oral, Daily  FLUoxetine, 10 mg, Oral, Daily  omeprazole (PRILOSEC) suspension 2 mg/mL, 20 mg, Oral, Daily  sodium chloride, 4 mL, Nebulization, Q6H  thiamine, 100 mg, Oral, Daily    PRN Meds:  acetaminophen, 650 mg, Oral, Q4H PRN  polyethylene glycol, 17 g, Oral, Daily PRN  senna, 17.6 mg, Oral, Daily PRN      Discharge Plan: TBD. LTACH denied by insurance    Network Utilization Review Department  ATTENTION: Please call with any questions or concerns to [phone] and carefully listen to the prompts so that you are directed to the right person. All voicemails are confidential.  Bill Lefort all requests for admission clinical reviews, approved or denied determinations and any other requests to dedicated fax number below belonging to the campus where the patient is receiving treatment.  List of dedicated fax numbers for the Facilities:  [place] DENIALS (Administrative/Medical Necessity) [phone] [address] [facility] (Maternity/NICU/Pediatrics) [phone]   [address] [place] [address] Drive [phone]   [facility] 1000 [facility] [phone]   1500 [facility] 207 [facility] [id number] [place] [address] [phone]   [address] [facility] 1300 [facility] [address] [place] [address] Nn [phone]

## 2023-09-14 ENCOUNTER — HOME CARE VISIT (OUTPATIENT)
Dept: HOME HEALTH SERVICES | Facility: HOME HEALTHCARE | Age: 38
End: 2023-09-14
Payer: COMMERCIAL

## 2023-09-14 PROBLEM — Z93.0 STATUS POST TRACHEOSTOMY (HCC): Status: ACTIVE | Noted: 2023-09-14

## 2023-09-14 LAB
ANION GAP SERPL CALCULATED.3IONS-SCNC: 3 MMOL/L
BUN SERPL-MCNC: 16 MG/DL (ref 5–25)
CALCIUM SERPL-MCNC: 8.8 MG/DL (ref 8.4–10.2)
CHLORIDE SERPL-SCNC: 105 MMOL/L (ref 96–108)
CO2 SERPL-SCNC: 32 MMOL/L (ref 21–32)
CREAT SERPL-MCNC: 0.57 MG/DL (ref 0.6–1.3)
GFR SERPL CREATININE-BSD FRML MDRD: 132 ML/MIN/1.73SQ M
GLUCOSE SERPL-MCNC: 121 MG/DL (ref 65–140)
MAGNESIUM SERPL-MCNC: 2.2 MG/DL (ref 1.9–2.7)
PHOSPHATE SERPL-MCNC: 3.4 MG/DL (ref 2.7–4.5)
POTASSIUM SERPL-SCNC: 4.3 MMOL/L (ref 3.5–5.3)
SODIUM SERPL-SCNC: 140 MMOL/L (ref 135–147)

## 2023-09-14 PROCEDURE — 94669 MECHANICAL CHEST WALL OSCILL: CPT

## 2023-09-14 PROCEDURE — NC001 PR NO CHARGE: Performed by: STUDENT IN AN ORGANIZED HEALTH CARE EDUCATION/TRAINING PROGRAM

## 2023-09-14 PROCEDURE — 94150 VITAL CAPACITY TEST: CPT

## 2023-09-14 PROCEDURE — 99232 SBSQ HOSP IP/OBS MODERATE 35: CPT | Performed by: STUDENT IN AN ORGANIZED HEALTH CARE EDUCATION/TRAINING PROGRAM

## 2023-09-14 PROCEDURE — 94003 VENT MGMT INPAT SUBQ DAY: CPT

## 2023-09-14 PROCEDURE — 97110 THERAPEUTIC EXERCISES: CPT

## 2023-09-14 PROCEDURE — 94760 N-INVAS EAR/PLS OXIMETRY 1: CPT

## 2023-09-14 PROCEDURE — 97530 THERAPEUTIC ACTIVITIES: CPT

## 2023-09-14 PROCEDURE — 94668 MNPJ CHEST WALL SBSQ: CPT

## 2023-09-14 PROCEDURE — 80048 BASIC METABOLIC PNL TOTAL CA: CPT | Performed by: NURSE PRACTITIONER

## 2023-09-14 PROCEDURE — 84100 ASSAY OF PHOSPHORUS: CPT | Performed by: NURSE PRACTITIONER

## 2023-09-14 PROCEDURE — 83735 ASSAY OF MAGNESIUM: CPT | Performed by: NURSE PRACTITIONER

## 2023-09-14 PROCEDURE — 94640 AIRWAY INHALATION TREATMENT: CPT

## 2023-09-14 RX ADMIN — Medication 20 MG: at 09:27

## 2023-09-14 RX ADMIN — APIXABAN 5 MG: 5 TABLET, FILM COATED ORAL at 17:04

## 2023-09-14 RX ADMIN — Medication 1000 UNITS: at 09:27

## 2023-09-14 RX ADMIN — ALBUTEROL SULFATE 2.5 MG: 2.5 SOLUTION RESPIRATORY (INHALATION) at 02:16

## 2023-09-14 RX ADMIN — ALBUTEROL SULFATE 2.5 MG: 2.5 SOLUTION RESPIRATORY (INHALATION) at 08:01

## 2023-09-14 RX ADMIN — APIXABAN 5 MG: 5 TABLET, FILM COATED ORAL at 09:27

## 2023-09-14 RX ADMIN — SODIUM CHLORIDE SOLN NEBU 3% 4 ML: 3 NEBU SOLN at 08:01

## 2023-09-14 RX ADMIN — THIAMINE HCL TAB 100 MG 100 MG: 100 TAB at 09:27

## 2023-09-14 RX ADMIN — FLUOXETINE 10 MG: 10 CAPSULE ORAL at 09:28

## 2023-09-14 RX ADMIN — CHLORHEXIDINE GLUCONATE 15 ML: 1.2 RINSE ORAL at 09:27

## 2023-09-14 RX ADMIN — SODIUM CHLORIDE SOLN NEBU 3% 4 ML: 3 NEBU SOLN at 02:16

## 2023-09-14 RX ADMIN — ALBUTEROL SULFATE 2.5 MG: 2.5 SOLUTION RESPIRATORY (INHALATION) at 13:19

## 2023-09-14 RX ADMIN — CHLORHEXIDINE GLUCONATE 15 ML: 1.2 RINSE ORAL at 21:05

## 2023-09-14 RX ADMIN — ALBUTEROL SULFATE 2.5 MG: 2.5 SOLUTION RESPIRATORY (INHALATION) at 19:38

## 2023-09-14 NOTE — PROGRESS NOTES
Critical Care Attending Note; Delgado Judd   Note Date: 23  Note Time: 9:31 AM    Patient: Shayan Lawler  Age, : 41 y.o., 1985 MRN: [id number] Code Status: Level 1 - Full Code Patient Location: ICU /ICU    Hospital LOS:21 days  ]   Patient seen and examined, medical record reviewed, discussed with house staff and nursing staff.      HPI   CC: AMS  38M with a PMH of Met L Testicular Seminoma(s/p orchiectomy/chemotherapy), HFpEF, HTN, Obesity, OHS, remote meth/EtOH abuse and depression and recent admission  for AMS(Concerning for Wernickes vs Narcolepsy) who represents with AMS, weakness,  hypoxia reqruiring intubation    Key Recent Events   : Admitted, Intubated  : Trach/PEG, Pulse dose steroids, IVIG     Main ICU Plans:       #Neuro  Weakness  - Unclear Etiology - Negative workup so far(- Ach, Normal CSF protein, paraneoplastic panel) - No signs of recovery at this time, still too early to see if patient failed IVIG  - Completed Pulse dose steroid/IVIG   - Neurology Consulted - Appreciate recs  - Follow up rheumatic workup, CSF  - PT/OT   - EMG   - Continue thiamine    #Card  Pulmonary Embolism   - Eloquis    #Pulm  Acute Respiratory Failure - NIF Daily ~ 68nxA4F - low likelyhood he will be able to wean at this time  - AC/VC TV 500ml - PST daily  - Wean FiO2 - Maintain O2 Sat >90%  - Trach Care/Vap Bundle  - Increase Pulm toilet regimen - Albuterol, Chest PT      #DVT/GI ppx  -Apixban    #Lines/Tubes/Drains:   Peripheral IV 23 Right Antecubital (Active)   Number of days: 2       #Nutrition:   Diet Enteral/Parenteral; Tube Feeding No Oral Diet; Jevity 1.2 Scott; Continuous; 70; Prosource Protein Liquid - One Packet Daily; 200        #Code Status:   Level 1 - Full Code    #Dispo:   LTAC vs Rehab vs Home         Delgado Judd MD  Pulmonary, Critical Care    Critical care time, excluding procedures, teaching, family meetings, and excludes any prior time recorded by the AP/resident, 35 minutes. Upon my evaluation, this patient has a high probability of imminent or life-threatening deterioration due to above problems which required my direct attention, intervention, and personal management. Impression/Active Problems:    Testicular Seminoma   HFpEF   HTN   Obesity   OHS   Wernickes encephalopathy   Myopathy   Chronic Respiratory Failure   Increased Secretions    Physical Exam:     Vital Signs:   Weight: 134 kg (294 lb 8.6 oz)  IBW: Ideal body weight: 86.8 kg (191 lb 5.7 oz)  Adjusted ideal body weight: 106 kg (232 lb 10.1 oz)  Temp:  [98.4 °F (36.9 °C)-98.8 °F (37.1 °C)] 98.4 °F (36.9 °C)  HR:  [] 82  Resp:  [13-36] 13  BP: (101-138)/(63-79) 110/75  FiO2 (%):  [40] 40  General: NAD  Neuro: Alert, follows commands, 3-4/5 UE weaknes  Heart: RRR  Lungs: Basilar crackles  Abdomen: Soft NT  Extremities: No Edema                Ventilator Settings:     Vent Mode: CPAP/PS Spont  FiO2 (%):  [40] 40          Invalid input(s): "PCO2", "O2"  Radiologic Images Reviewed:    MRI Brain  No acute intracranial pathology. Unremarkable MRI of the orbits. Improving enhancement of the hypothalamus. 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 CXR  Improved aeration of the bilateral lungs with mild bibasilar atelectasis.      Input / Output:       Intake/Output Summary (Last 24 hours) at 9/14/2023 0931  Last data filed at 9/14/2023 0600  Gross per 24 hour   Intake 1560 ml   Output --   Net 1560 ml            Infusions:     Scheduled Medications:  Current Facility-Administered Medications   Medication Dose Route Frequency Provider Last Rate   • acetaminophen  650 mg Oral Q4H PRN AMBER Edwards     • albuterol  2.5 mg Nebulization Q6H AMBER Edwards     • apixaban  5 mg Oral BID AMBER Gonzalez     • chlorhexidine  15 mL Mouth/Throat Q12H 1545 AMBER Cerrato     • cholecalciferol  1,000 Units Oral Daily Agueda Agrawal MD     • FLUoxetine  10 mg Oral Daily AMBER Erickson     • omeprazole (PRILOSEC) suspension 2 mg/mL  20 mg Oral Daily AMBER Erickson     • polyethylene glycol  17 g Oral Daily PRN AMBER Erickson     • senna  17.6 mg Oral Daily PRN AMBER Erickson     • sodium chloride  4 mL Nebulization Q6H AMBER Erickson     • thiamine  100 mg Oral Daily AMBER Erickson         PRN Medications:  •  acetaminophen  •  polyethylene glycol  •  senna    Labs Reviewed:  Results from last 7 days   Lab Units 09/09/23  0606   WBC Thousand/uL 5.57   HEMOGLOBIN g/dL 10.8*   HEMATOCRIT % 34.2*   PLATELETS Thousands/uL 234      Results from last 7 days   Lab Units 09/14/23  0500 09/13/23  0535 09/12/23  0529 09/11/23  0532 09/09/23  0606   SODIUM mmol/L 140 142 145 144 148*   CO2 mmol/L 32 29 28 27 27   BUN mg/dL 16 18 22 24 16   CALCIUM mg/dL 8.8 8.4 8.7 9.0 9.2   MAGNESIUM mg/dL 2.2  --  2.1 2.4 2.4   PHOSPHORUS mg/dL 3.4  --   --  2.7 3.5         Invalid input(s): "ASTSGOT", "ALTSGPT"LABRCNTIP@ ,alkphos:3,tbilirubin:3,dbilirubin:3)@      Invalid input(s): "TROPT", "CPK", "PBNP"             I have personally seen and examined the patient on (09/14/23 between [id number]). I discussed the patient with the AP/resident including, but not limited to, verifying findings; reviewing labs and x-rays; discussing with consultants; developing the plan of care with the bedside nurse; and discussing treatment plan with patient or surrogate. I have reviewed the note and assessment performed by the AP/resident and agree with the AP/resident’s documented findings and plan of care with the above additions/exceptions. Please see my comments for details and adjustments.

## 2023-09-14 NOTE — CASE MANAGEMENT
Case Management Discharge Planning Note    Patient name Laith Lot  Location ICU 01/ICU 01 MRN [id number]  : 1985 Date 2023       Current Admission Date: 2023  Current Admission Diagnosis:Acute respiratory failure with hypoxia [facility])   Patient Active Problem List    Diagnosis Date Noted   • Status post tracheostomy ([address]) 2023   • S/P percutaneous endoscopic gastrostomy (PEG) tube placement ([address]) 2023   • Anxiety 2023   • Acute respiratory failure with hypoxia ([address]) 2023   • Acute pulmonary embolism without acute cor pulmonale ([address]) 2023   • History of Wernicke's encephalopathy 2023   • Depression 2023   • Neuromuscular weakness ([address]) 2023   • History of LVH (left ventricular hypertrophy) 2023   • Pure hypertriglyceridemia 2023   • Encephalopathy 2023   • Unilateral vestibular schwannoma ([address]) 2023   • Port-A-Cath in place 2023   • Diplopia 2023   • 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   • Urinary hesitancy    • Umbilical hernia without obstruction and without gangrene 12/10/2022   • Tobacco use 12/10/2022   • Retroperitoneal lymphadenopathy 12/10/2022   • Hypertension 12/10/2022      LOS (days): 21  Geometric Mean LOS (GMLOS) (days):   Days to GMLOS:     OBJECTIVE:  Risk of Unplanned Readmission Score: 30.64     Current admission status: Inpatient   Preferred Pharmacy:   [address] [place] [address] [address], [address]  Phone: [phone] Fax: [phone]    Primary Care Provider: Teodoro Schirmer, MD    Primary Insurance: [facility]  Secondary Insurance:     DISCHARGE DETAILS:    Discharge planning discussed with[de-identified] Saw ALLEN) and pt's mother  Freedom of Choice: Yes  Comments - Freedom of Choice: SW following to assist with DCP. SW met with pt's SO when she brought in pt's current insurance card.   Pt's mother was on phone talking with pt during visit. SW updated SO on vent rehab referral status including unsuccessful attempts to find vent facility closer that [place] area. SO informed SW that pt's mother does not want pt going far for rehab and prefers to bring pt home. Pt's mother on speaker phone confirmed same. SW reviewed recommendations for rehab for weaning purposes. Mother expressed that pt will be able to wean at home. Pt's SO said she is in favor of rehab but understands it is the family's decision. SW recommended another family meeting tomorrow to finalize plans. SO spoke with mother and mother agreed to meeting tomorrow at 11:00 am.  SW notified Melinda Patino, [address] [place] [address]. SW will follow. CM contacted family/caregiver?: Yes  Were Treatment Team discharge recommendations reviewed with patient/caregiver?: Yes  Did patient/caregiver verbalize understanding of patient care needs?: Yes  Were patient/caregiver advised of the risks associated with not following Treatment Team discharge recommendations?: Yes    Contacts  Patient Contacts: Yamila Triplett  Relationship to Patient[de-identified] Other (Comment) (significant other)  Contact Method: In Person  Reason/Outcome: Continuity of Care, Discharge Planning    Other Referral/Resources/Interventions Provided:  Interventions: DME, Short Term Rehab, HHC, Other (Specify) (vent)  Referral Comments: Vent rehab referral process ongoing. Current insurance card attached to referral in [address] [place] [address] for facilities. Messages left for two of the facility admission directors requesting response to referral.  Based on mother's request to have pt return home SW initiated orders for tube feed and trach supplies. [facility], regarding order process for home vent. SW will continue to follow to assist with planning as needed.     Treatment Team Recommendation: Short Term Rehab, Other (vent rehab)  Discharge Destination Plan[de-identified] Home with Home Health Care, Short Term Rehab, Other (pending family decision)  Transport at Discharge : [facility]

## 2023-09-14 NOTE — PROGRESS NOTES
[address]  Progress Note  Name: Stephanie Khalil  MRN: [id number]  Unit/Bed#: ICU 01 I Date of Admission: 8/24/2023   Date of Service: 9/14/2023 I Hospital Day: 21    Assessment/Plan   * Acute respiratory failure with hypoxia [facility])  Assessment & Plan  Patient presented with hypoxia 8/23, found to have right lower lobe segmental PE. Initially saturating well on nasal cannula. Rapid response was called 8/24 secondary to hypoxia and altered mental status requiring emergent intubation. Hypoxic post intubation requiring high ventilator settings. · Of note, was intubated during hospital stay at [facility] 8/12-8/13 for airway protection when acutely had GCS of 5  · CTA PE study 8/23: PE in the right lower lobe segmental pulmonary artery  · CXR 8/23 infiltrates  · Strep pneumo/legionella urine antigen negative  · COVID/Flu/RSV negative  · 8/25: Bronchoscopy: poorly tolerated secondary to hypoxia. Some mucus plugging present on the left. · 8/26: CT chest: Complete right lower lobe and near complete left lower lobe atelectasis. Pneumonia not excluded in the appropriate clinical setting. Mild interstitial edema with trace effusions  · Received diuresis with IV Lasix   · 8/31 Completed Zosyn x 7 days   · 9/8 Trach placed  · 9/11 NIF -17    Plan:  · Continue PS weans   · Continue pulmonary hygiene   · Daily NIF's  · VAP ppx; chlorhexidine, PPI, HOB greater than 30 degrees  · Family meeting took place on 9/13  · LTACH/Vent rehab planning    Neuromuscular weakness [facility])  Assessment & Plan  Patient initially presented to [facility] on 8[facility] with hypoxia and encephalopathy. Acute change in GCS prompted rapid response and subsequent intubation for airway protection.  Of note, pt w/ recent hospitalization to [facility] 8/11-8/17 for encephalopathy with unclear etiology despite extensive workup but Wernicke's encephalopathy was on the differential.    · [facility] workup 8/11:  · Rapid response and intubation on 8/12 for airway protection, extubated on 8/13  · CT head 8/11: No acute intracranial abnormality. Stable small bilateral subcortical hypoattenuating foci. No associated mass effect. · MRI 8/12: No acute intracranial abnormality. Stable nonspecific enhancement along the anterior floor of the third ventricle/hypothalamus compared to March 2023 study. Findings below:  · VEEG 8/12: negative for seizure activity. Possible findings relating to underlying sleep disorder   · LP: Grossly negative overall  · DSPO: Treated with high dose thiamine. Psych evaluated, possible bipolar disorder. Discharged to home with his sister with visiting nurses, ST/PT/OT  · [facility] 8/23: Presented with hypoxia found by visiting nurse SPO2 in 80's  · 8/24: RRT > intubated for airway protection   · Transferred to [facility] on 8/24  · ASHLEY Hand 8/24 (Current hospital course)  · CT head 8/24: No acute intracranial abnormality  · 8/25 EEG: No evidence of electrographic seizures. Mild background slowing suggestive of underlying cerebral dysfunction from toxic/metablic/infectious/hypoxic encephalopathy. Clinical correlation is recommended  · On neurological exam: wakes to voice, follows 2 step commands   · Continues to have global weakness with poor NIF's unable to wean from ventilator   · Ach binding and blocking Ab negative   · MUSK Ab and ACHR modulating Ab neg  · Serum anti-TPO and DARIN Ab's, RPR, neg. CSF paraneoplastic panel neg  · Varicella PCR neg  · Toxoplasma antibodies negative  · Rheum labs negative  · 9/6 repeat LP cultures negative   · 9/7 Completed 7 days of Ceftriaxone to cover for possible neurosyphillis  · MRI 9/9: No acute intracranial pathology. Unremarkable MRI of the orbits. Improving enhancement of the hypothalamus. 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   · 9/10 completed 3 days pulse dose steroids  · 9/11 completed 5 days IVIG     Plan:  · Neurology following  · [facility]GCC serum test pending, Hypocretin CSF pending   · Continue thiamine  · NIF daily   · Outpt EMG in 2-3 weeks. Per neuro, if still hospitalized on Friday 9/15 will attempt EMG inpatient  · Neurochecks per routine     Acute pulmonary embolism without acute cor pulmonale [facility])  Assessment & Plan  Presented 8/24 with hypoxia, initially admitted to the floor on supplemental NC  · CTA PE: Pulmonary embolism in the right lower lobe segmental pulmonary artery. Measured RV/LV ratio was within normal limits at less than 0.9. · BNP 9, initial HS troponin 9  · Initiated on heparin gtt, transitioned to therapeutic Lovenox on 8/26  · Echo completed 8/25: Left Ventricle: Left ventricular cavity size is normal. Wall thickness is normal. The left ventricular ejection fraction is 60% by visual estimation. . Wall motion is normal. Diastolic function is normal for age. Right Ventricle: Right ventricular cavity size is normal. Systolic function is normal. Normal tricuspid annular plane systolic excursion (TAPSE) > 1.7 cm. · Dopplers of LE neg for DVT bilaterally    Plan:  · Continue Eliquis     Status post tracheostomy [facility])  Assessment & Plan  Trach care    S/P percutaneous endoscopic gastrostomy (PEG) tube placement [facility])  Assessment & Plan  · Continue tube feeds at goal     History of LVH (left ventricular hypertrophy)  Assessment & Plan  Previous echocardiogram 6/2/23 showed EF 60%, normal diastolic dysfunction, mild left ventricular hypertrophy, mild RVSP elevation and mild TR  · PTA Med: Torsemide 20 mg daily  · Evaluated by Heart Failure team in July 2023.  LVH thought to be secondary to hypertension, obesity, hx of meth use  · Recommended sleep study at that time   · Echo this admission shows normal EF of 60%, normal LV thickness, no diastolic dysfunction    Depression  Assessment & Plan  · Fluoxetine 10 mg daily  · Consider additional psych consult     Seminoma of left testis [facility])  Assessment & Plan  · Testicular cancer s/p left orchiectomy on 12/2022  · Repeat CT scan in January 2023 with enlarging lymph node. · Began Chemo in March 2023: Etoposide and Cisplatin with plan for 4 cycles, 5 days every 21 days  · Did not complete chemo treatment due to adverse effects  · Stopped Cisplatin after 1 cycle due to painful neuropathy  · Continued Etoposide 2nd cycle  · Resumed Cisplatin and Etoposide 3rd cycle  · After 3rd cycle reported double vision, labile affect and right sided weakness and therefore chemotherapy was stopped on 5/26/23  · Continue outpatient follow up with Heme/Onc     Hypertension  Assessment & Plan  Hx of hypertension. PTA was on Torsemide 20 mg daily  · Patient has been normotensive  · Remains off anti-hypertensives     Umbilical hernia without obstruction and without gangrene  Assessment & Plan  · Umbilical hernia present, easily reducible           ICU Core Measures     Vented Patient  VAP Bundle  VAP bundle ordered     A: Assess, Prevent, and Manage Pain · Has pain been assessed? Yes  · Need for changes to pain regimen? No   B: Both Spontaneous Awakening Trials (SATs) and Spontaneous Breathing Trials (SBTs) · Plan to perform spontaneous awakening trial today? N/A   · Plan to perform spontaneous breathing trial today? Yes   · Obvious barriers to extubation? NA   C: Choice of Sedation · RASS Goal: 0 Alert and Calm  · Need for changes to sedation or analgesia regimen? No   D: Delirium · CAM-ICU: Negative   E: Early Mobility  · Plan for early mobility? Yes   F: Family Engagement · Plan for family engagement today? Yes     Review of Invasive Devices:      Prophylaxis:  VTE VTE covered by:  apixaban, Oral, 5 mg at 09/13/23 1746       Stress Ulcer  covered byomeprazole (PRILOSEC) suspension 2 mg/mL [[id number]]       Subjective   HPI/24hr events:     Review of Systems   Unable to perform ROS: Acuity of condition   Constitutional: Negative for fever. Eyes: Negative for visual disturbance. Cardiovascular: Negative for chest pain. Gastrointestinal: Negative for abdominal pain. Objective                            Vitals I/O      Most Recent Min/Max in 24hrs   Temp 98.6 °F (37 °C) Temp  Min: 98.5 °F (36.9 °C)  Max: 98.8 °F (37.1 °C)   Pulse 74 Pulse  Min: 68  Max: 101   Resp 15 Resp  Min: 14  Max: 36   /72 BP  Min: 100/62  Max: 138/78   O2 Sat 98 % SpO2  Min: 95 %  Max: 100 %      Intake/Output Summary (Last 24 hours) at 9/14/2023 0853  Last data filed at 9/14/2023 0600  Gross per 24 hour   Intake 1560 ml   Output --   Net 1560 ml         Diet Enteral/Parenteral; Tube Feeding No Oral Diet; Jevity 1.2 Scott; Continuous; 70; Prosource Protein Liquid - One Packet Daily; 200     Invasive Monitoring Physical exam    Physical Exam  Eyes:      Extraocular Movements: Extraocular movements intact. Pupils: Pupils are equal, round, and reactive to light. Skin:     General: Skin is warm and dry. HENT:      Mouth/Throat:      Mouth: Mucous membranes are moist.   Neck:     Trachea: Tracheostomy present. Cardiovascular:      Rate and Rhythm: Normal rate and regular rhythm. Heart sounds: Normal heart sounds. Musculoskeletal:      Right lower leg: No edema. Left lower leg: No edema. Abdominal:      Hernia: A hernia is present. Comments: PEG tube present   Constitutional:       General: He is not in acute distress. Pulmonary:      Effort: Pulmonary effort is normal.      Breath sounds: No wheezing. Comments: On vent  Neurological:      Mental Status: He is alert. He is calm. Comments: Slow weak movements x 4 extremities, symmetric  strength   Vitals reviewed. Diagnostic Studies      Imaging:  I have personally reviewed pertinent reports.        Medications:  Scheduled PRN   albuterol, 2.5 mg, Q6H  apixaban, 5 mg, BID  chlorhexidine, 15 mL, Q12H ISAEL  cholecalciferol, 1,000 Units, Daily  FLUoxetine, 10 mg, Daily  omeprazole (PRILOSEC) suspension 2 mg/mL, 20 mg, Daily  sodium chloride, 4 mL, Q6H  thiamine, 100 mg, Daily      acetaminophen, 650 mg, Q4H PRN  polyethylene glycol, 17 g, Daily PRN  senna, 17.6 mg, Daily PRN       Continuous          Labs:    CBC    No recent results  BMP    Recent Labs     09/13/23  0535 09/14/23  0500   SODIUM 142 140   K 3.7 4.3    105   CO2 29 32   AGAP 6 3   BUN 18 16   CREATININE 0.58* 0.57*   CALCIUM 8.4 8.8       Coags    No recent results     Additional Electrolytes  Recent Labs     09/14/23  0500   MG 2.2   PHOS 3.4          Blood Gas    No recent results  No recent results LFTs  No recent results    Infectious  No recent results  Glucose  Recent Labs     09/13/23  0535 09/14/23  0500   GLUC 99 121               Rufino Paz MD

## 2023-09-14 NOTE — ASSESSMENT & PLAN NOTE
Patient presented with hypoxia 8/23, found to have right lower lobe segmental PE. Initially saturating well on nasal cannula. Rapid response was called 8/24 secondary to hypoxia and altered mental status requiring emergent intubation. Hypoxic post intubation requiring high ventilator settings. · Of note, was intubated during hospital stay at [facility] 8/12-8/13 for airway protection when acutely had GCS of 5  · CTA PE study 8/23: PE in the right lower lobe segmental pulmonary artery  · CXR 8/23 infiltrates  · Strep pneumo/legionella urine antigen negative  · COVID/Flu/RSV negative  · 8/25: Bronchoscopy: poorly tolerated secondary to hypoxia. Some mucus plugging present on the left. · 8/26: CT chest: Complete right lower lobe and near complete left lower lobe atelectasis. Pneumonia not excluded in the appropriate clinical setting.  Mild interstitial edema with trace effusions  · Received diuresis with IV Lasix   · 8/31 Completed Zosyn x 7 days   · 9/8 Trach placed  · 9/11 NIF -17    Plan:  · Continue PS weans   · Continue pulmonary hygiene   · Daily NIF's  · VAP ppx; chlorhexidine, PPI, HOB greater than 30 degrees  · Family meeting took place on 9/13  · LTACH/Vent rehab planning

## 2023-09-14 NOTE — ASSESSMENT & PLAN NOTE
Patient initially presented to [address] [place] [address] on 8/23 with hypoxia and encephalopathy. Acute change in GCS prompted rapid response and subsequent intubation for airway protection. Of note, pt w/ recent hospitalization to [facility] 8/11-8/17 for encephalopathy with unclear etiology despite extensive workup but Wernicke's encephalopathy was on the differential.    ·  [place] workup 8/11:  · Rapid response and intubation on 8/12 for airway protection, extubated on 8/13  · CT head 8/11: No acute intracranial abnormality. Stable small bilateral subcortical hypoattenuating foci. No associated mass effect. · MRI 8/12: No acute intracranial abnormality. Stable nonspecific enhancement along the anterior floor of the third ventricle/hypothalamus compared to March 2023 study. Findings below:  · VEEG 8/12: negative for seizure activity. Possible findings relating to underlying sleep disorder   · LP: Grossly negative overall  · DSPO: Treated with high dose thiamine. Psych evaluated, possible bipolar disorder. Discharged to home with his sister with visiting nurses, ST/PT/OT  ·  Michel Dean 8/23: Presented with hypoxia found by visiting nurse SPO2 in 80's  · 8/24: RRT > intubated for airway protection   · Transferred to [facility] on 8/24  ·  Cyndee Lara 8/24 (Current hospital course)  · CT head 8/24: No acute intracranial abnormality  · 8/25 EEG: No evidence of electrographic seizures. Mild background slowing suggestive of underlying cerebral dysfunction from toxic/metablic/infectious/hypoxic encephalopathy. Clinical correlation is recommended  · On neurological exam: wakes to voice, follows 2 step commands   · Continues to have global weakness with poor NIF's unable to wean from ventilator   · Ach binding and blocking Ab negative   · MUSK Ab and ACHR modulating Ab neg  · Serum anti-TPO and DARIN Ab's, RPR, neg.  CSF paraneoplastic panel neg  · Varicella PCR neg  · Toxoplasma antibodies negative  · Rheum labs negative  · 9/6 repeat LP cultures negative   · 9/7 Completed 7 days of Ceftriaxone to cover for possible neurosyphillis  · MRI 9/9: No acute intracranial pathology. Unremarkable MRI of the orbits. Improving enhancement of the hypothalamus. 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 · 9/10 completed 3 days pulse dose steroids  · 9/11 completed 5 days IVIG     Plan:  · Neurology following  · VGCC serum test pending, Hypocretin CSF pending   · Continue thiamine  · NIF daily   · Outpt EMG in 2-3 weeks.  Per neuro, if still hospitalized on Friday 9/15 will attempt EMG inpatient  · Neurochecks per routine

## 2023-09-14 NOTE — PHYSICAL THERAPY NOTE
PT TREATMENT     09/14/23 1101   PT Last Visit   PT Visit Date 09/14/23   Pain Assessment   Pain Assessment Tool FLACC   Pain Rating: FLACC (Rest) - Face 0   Pain Rating: FLACC (Rest) - Legs 0   Pain Rating: FLACC (Rest) - Activity 0   Pain Rating: FLACC (Rest) - Cry 0   Pain Rating: FLACC (Rest) - Consolability 0   Score: FLACC (Rest) 0   Pain Rating: FLACC (Activity) - Face 0   Pain Rating: FLACC (Activity) - Legs 0   Pain Rating: FLACC (Activity) - Activity 0   Pain Rating: FLACC (Activity) - Cry 0   Pain Rating: FLACC (Activity) - Consolability 0   Score: FLACC (Activity) 0   Restrictions/Precautions   Other Precautions Chair Alarm; Fall Risk;O2  (vent, profound weakness)   General   Chart Reviewed Yes   Cognition   Overall Cognitive Status Unable to assess   Arousal/Participation Alert; Cooperative   Following Commands Follows one step commands without difficulty   Subjective   Subjective s/p trach, pt non verbal   Transfers   Sit to Stand 2  Maximal assistance   Additional items Assist x 2  (UE support on a walker)   Stand to Sit 2  Maximal assistance   Additional items Assist x 2   Additional Comments Pt stood 2x with UE support on a walker x 20 seconds each. Verbal and tactile cues for hip and knee extension   Balance   Static Sitting Fair -   Dynamic Sitting Poor -   Static Standing Poor +   Dynamic Standing Poor   Activity Tolerance   Activity Tolerance Patient tolerated treatment well;Patient limited by fatigue   Exercises    x 10 each ankle pumps, LAQ, seated hip flexion   Assessment   Prognosis Good   Problem List Decreased strength;Decreased endurance; Impaired balance;Decreased mobility   Assessment Pt demonstrates  progress towards all goals s/p VDRF. Pt co-treated with OT and RT as pt is still on a vent with RT planning to trial off vent after therapy session. Pt was able to stand fully erect with rolling walker x 2 trials for 20 seconds. Pt also participated in LE AROM exercises.   Plan to continue LE strenthening and standing tolerance/transfers in upcoming PT sessions. The patient's AM-PAC Basic Mobility Inpatient Short Form Raw Score is 7. A Raw score of less than or equal to 16 suggests the patient may benefit from discharge to post-acute rehabilitation services. Plan   Treatment/Interventions Endurance training; Therapeutic exercise;LE strengthening/ROM; Functional transfer training;ADL retraining;Gait training;Bed mobility; Equipment eval/education;Patient/family training   Progress Progressing toward goals   PT Frequency Other (Comment)  (5x/week)   Recommendation   PT Discharge Recommendation Post acute rehabilitation services   AM-PAC Basic Mobility Inpatient   Turning in Flat Bed Without Bedrails 1   Lying on Back to Sitting on Edge of Flat Bed Without Bedrails 1   Moving Bed to Chair 1   Standing Up From Chair Using Arms 1   Walk in Room 1   Climb 3-5 Stairs With Railing 1   Basic Mobility Inpatient Raw Score 6   Turning Head Towards Sound 4   Follow Simple Instructions 4   Low Function Basic Mobility Raw Score  14   Low Function Basic Mobility Standardized Score  22.01   Highest Level Of Mobility   JH-HLM Goal 2: Bed activities/Dependent transfer   JH-HLM Achieved 4: Move to chair/commode   End of Consult   Patient Position at End of Consult All needs within reach; Bedside chair  (TruVue heel protectors donned, pillows under arms, rolled washclothes in palms, pillows used at greater trochanter to promote neutral hip rotation)   Licensure   [place] License Number  Stephy Maldonado PT [id number]

## 2023-09-14 NOTE — OCCUPATIONAL THERAPY NOTE
OT TREATMENT         09/14/23 1035   OT Last Visit   OT Visit Date 09/14/23   Note Type   Note Type Treatment   Pain Assessment   Pain Assessment Tool FLACC   Pain Rating: FLACC (Rest) - Face 0   Pain Rating: FLACC (Rest) - Legs 0   Pain Rating: FLACC (Rest) - Activity 0   Pain Rating: FLACC (Rest) - Cry 0   Pain Rating: FLACC (Rest) - Consolability 0   Score: FLACC (Rest) 0   Restrictions/Precautions   Other Precautions Chair Alarm; Bed Alarm; Fall Risk;O2  (Trach/vent, PEG)   Lifestyle   Intrinsic Gratification Tristar   ADL   Grooming Assistance 5  Supervision/Setup   Grooming Deficit Wash/dry face   Grooming Comments using R hand seated in chair   Transfers   Sit to Stand 2  Maximal assistance   Additional items Assist x 2;Verbal cues  (use of RW, 2x completed, able to stand fully upright with R knees blocked today)   Stand to Sit 2  Maximal assistance   Additional items Assist x 2;Verbal cues   ROM- Right Upper Extremities   R Shoulder AROM; Flexion   R Elbow AROM;Elbow flexion;Elbow extension   R Wrist AROM; Wrist flexion;Wrist extension   R Hand AROM; Thumb; Index finger; Long finger;Ring finger;Little finger   R Weight/Reps/Sets 10 times each seated in chair   ROM - Left Upper Extremities    L Shoulder AROM; Flexion;AAROM   L Elbow AROM;Elbow flexion;Elbow extension   L Wrist AROM; Wrist flexion;Wrist extension   L Hand AROM; Thumb; Index finger; Long finger;Little finger;Ring finger   L Weight/Reps/Sets 10 times each seated in chair   Cognition   Arousal/Participation Alert; Cooperative   Following Commands Follows one step commands with increased time or repetition   Assessment   Assessment Patient seen for OT treatment. Patient is cooperative and pleasant. Patient with LUE weakness greater than RUE. Patient able to stand fully upright today. Patient is demonstrating progress towards OT goals. Patient will benefit from continued OT services to maximize functional performance with ADLS.  The patient's raw score on the AM-PAC Daily Activity Inpatient Short Form is 11. A raw score of less than 19 suggests the patient may benefit from discharge to post-acute rehabilitation services. Please refer to the recommendation of the Occupational Therapist for safe discharge planning. Plan   Treatment Interventions UE strengthening/ROM;ADL retraining;Functional transfer training;Patient/family training;Equipment evaluation/education; Activityengagement   OT Frequency 3-5x/wk   Recommendation   OT Discharge Recommendation Post acute rehabilitation services   AM-PAC Daily Activity Inpatient   Lower Body Dressing 1   Bathing 2   Toileting 2   Upper Body Dressing 2   Grooming 3   Eating 1  (PEG)   Daily Activity Raw Score 11   Daily Activity Standardized Score (Calc for Raw Score >=11) 29.04   Turning Head Towards Sound 4   Follow Simple Instructions 4   Low Function Daily Activity Raw Score 19   Low Function Daily Activity Standardized Score  31.34   AM-PAC Applied Cognition Inpatient   Following a Speech/Presentation 3   Understanding Ordinary Conversation 4   Taking Medications 3   Remembering Where Things Are Placed or Put Away 2   Remembering List of 4-5 Errands 2   Taking Care of Complicated Tasks 2   Applied Cognition Raw Score 16   Applied Cognition Standardized Score 12.06   Licensure   [place] License Number  Peggy Irizarry [id number] [facility] OTR/L [id number]

## 2023-09-14 NOTE — WOUND OSTOMY CARE
Progress Note - Wound   Azul Gustafson 41 y.o. male MRN: [id number]  Unit/Bed#: ICU 01 Encounter: [id number]      Assessment:   This is a follow up visit for this 41year old male patient admitted on 8/24/23 in acute respiratory failure d/t right lower lobe pulmonary embolus. He has a history of HTN, left ventricular hypertrophy, testicular cancer s/p L radical orchiectomy 12/2022, obesity and umbilical hernia. Patient sitting in reclining chair with tracheostomy in place - he nods or shakes his head appropriately when asked a question with wife at bedside.     Assessment Findings:  1-Blisters to left hand - intact blood blister to left 1st finger with no drainage and unroofed serum filled blister with pink granulation tissue to left dorsal hand with no drainage. Orders in place for wound care. 2-Bilateral heels intact - orders in place for skin care and for prevention. 3-As per primary RN, sacrobuttock wound remains healed - orders in place for skin care and for prevention.      Left 2nd finger blood blister     Left anterior hand unroofed blister     Right heel intact     Left heel intact      Plan:   Skin care plans:  1-Cleanse sacrobuttocks with foaming cleanser & pat dry. Apply Triad paste to entire sacrobuttock area in a thin layer 2x/day & prn soilage/dislodgement. 2-Cleanse blisters to left dorsal hand and left 2nd finger with NSS & pat dry. Open small Dermagran square & apply to blisters in a single layer cut to size. Cover with gauze, michele & tape. Change every other day & prn soilage/dislodgement. 3-Hydraguard to bilateral heels BID and PRN  4-Float heels on 2 pillows to offload pressure so heels are not in contact with mattress or pillows. 5-Ehob pressure redistribution cushion in chair when out of bed. Limit prolonged sitting.   6-Moisturize skin daily with skin nourishing cream.  7-Turn/reposition q2h or when medically stable for pressure re-distribution on skin.         Discussed assessment findings, and plan of care/recommendations with Joann JEONG.     Wound care will follow along with patient throughout admission, please call or tiger text with questions and concerns.     Recommendations written as orders.   Drew Roy MSN, RN, Yessi Noonan

## 2023-09-14 NOTE — PLAN OF CARE
Problem: Prexisting or High Potential for Compromised Skin Integrity  Goal: Skin integrity is maintained or improved  Description: INTERVENTIONS:  - Identify patients at risk for skin breakdown  - Assess and monitor skin integrity  - Assess and monitor nutrition and hydration status  - Monitor labs   - Assess for incontinence   - Turn and reposition patient  - Assist with mobility/ambulation  - Relieve pressure over bony prominences  - Avoid friction and shearing  - Provide appropriate hygiene as needed including keeping skin clean and dry  - Evaluate need for skin moisturizer/barrier cream  - Collaborate with interdisciplinary team   - Patient/family teaching  - Consider wound care consult   Outcome: Progressing     Problem: MOBILITY - ADULT  Goal: Maintain or return to baseline ADL function  Description: INTERVENTIONS:  -  Assess patient's ability to carry out ADLs; assess patient's baseline for ADL function and identify physical deficits which impact ability to perform ADLs (bathing, care of mouth/teeth, toileting, grooming, dressing, etc.)  - Assess/evaluate cause of self-care deficits   - Assess range of motion  - Assess patient's mobility; develop plan if impaired  - Assess patient's need for assistive devices and provide as appropriate  - Encourage maximum independence but intervene and supervise when necessary  - Involve family in performance of ADLs  - Assess for home care needs following discharge   - Consider OT consult to assist with ADL evaluation and planning for discharge  - Provide patient education as appropriate  Outcome: Progressing  Goal: Maintains/Returns to pre admission functional level  Description: INTERVENTIONS:  - Perform BMAT or MOVE assessment daily.   - Set and communicate daily mobility goal to care team and patient/family/caregiver. - Collaborate with rehabilitation services on mobility goals if consulted  - Perform Range of Motion 4 times a day.   - Reposition patient every 2 hours.  -- Record patient progress and toleration of activity level   Outcome: Progressing     Problem: CARDIOVASCULAR - ADULT  Goal: Maintains optimal cardiac output and hemodynamic stability  Description: INTERVENTIONS:  - Monitor I/O, vital signs and rhythm  - Monitor for S/S and trends of decreased cardiac output  - Administer and titrate ordered vasoactive medications to optimize hemodynamic stability  - Assess quality of pulses, skin color and temperature  - Assess for signs of decreased coronary artery perfusion  - Instruct patient to report change in severity of symptoms  Outcome: Progressing  Goal: Absence of cardiac dysrhythmias or at baseline rhythm  Description: INTERVENTIONS:  - Continuous cardiac monitoring, vital signs, obtain 12 lead EKG if ordered  - Administer antiarrhythmic and heart rate control medications as ordered  - Monitor electrolytes and administer replacement therapy as ordered  Outcome: Progressing     Problem: GENITOURINARY - ADULT  Goal: Maintains or returns to baseline urinary function  Description: INTERVENTIONS:  - Assess urinary function  - Encourage oral fluids to ensure adequate hydration if ordered  - Administer IV fluids as ordered to ensure adequate hydration  - Administer ordered medications as needed  - Offer frequent toileting  - Follow urinary retention protocol if ordered  Outcome: Progressing  Goal: Absence of urinary retention  Description: INTERVENTIONS:  - Assess patient’s ability to void and empty bladder  - Monitor I/O  - Bladder scan as needed  - Discuss with physician/AP medications to alleviate retention as needed  - Discuss catheterization for long term situations as appropriate  Outcome: Progressing     Problem: METABOLIC, FLUID AND ELECTROLYTES - ADULT  Goal: Electrolytes maintained within normal limits  Description: INTERVENTIONS:  - Monitor labs and assess patient for signs and symptoms of electrolyte imbalances  - Administer electrolyte replacement as ordered  - Monitor response to electrolyte replacements, including repeat lab results as appropriate  - Instruct patient on fluid and nutrition as appropriate  Outcome: Progressing  Goal: Fluid balance maintained  Description: INTERVENTIONS:  - Monitor labs   - Monitor I/O and WT  - Instruct patient on fluid and nutrition as appropriate  - Assess for signs & symptoms of volume excess or deficit  Outcome: Progressing     Problem: SKIN/TISSUE INTEGRITY - ADULT  Goal: Skin Integrity remains intact(Skin Breakdown Prevention)  Description: Assess:  -Perform Adria assessment every shift   -Clean and moisturize skin every 2 hours and PRN   -Inspect skin when repositioning, toileting, and assisting with ADLS  -Assess under medical devices such as cardiac monitor wires IV tubing every 2 hours and PRN   -Assess extremities for adequate circulation and sensation     Bed Management:  -Have minimal linens on bed & keep smooth, unwrinkled  -Change linens as needed when moist or perspiring  -Avoid sitting or lying in one position for more than 2 hours while in bed  -Keep HOB at 30degrees     Toileting:  -Offer bedside commode  -Assess for incontinence every 2 hours  -Use incontinent care products after each incontinent episode such as pads    Activity:  -Mobilize patient 3 times a day  -Encourage or provide ROM exercises   -Turn and reposition patient every 2 Hours  -Use appropriate equipment to lift or move patient in bed  -Instruct/ Assist with weight shifting every hour when out of bed in chair      Skin Care:  -Avoid use of baby powder, tape, friction and shearing, hot water or constrictive clothing  -Relieve pressure over bony prominences using foam dressing  -Do not massage red bony areas    Next Steps:  -Consider consults to  interdisciplinary teams such as PT/OT  Outcome: Progressing  Goal: Pressure injury heals and does not worsen  Description: Interventions:  - Implement low air loss mattress or specialty surface (Criteria met)  - Apply silicone foam dressing  - Instruct/assist with weight shifting every 60  minutes when in chair   - Limit chair time to 2 hour intervals  - Use special pressure reducing interventions such as EHOB when in chair   - Apply fecal or urinary incontinence containment device   - Perform passive or active ROM every 2 hours   - Turn and reposition patient & offload bony prominences every 2 hours   - Utilize friction reducing device or surface for transfers   - Consider consults to  interdisciplinary teams such as PT/OT  - Use incontinent care products after each incontinent episode such as pads   - Consider nutrition services referral as needed  Outcome: Progressing     Problem: HEMATOLOGIC - ADULT  Goal: Maintains hematologic stability  Description: INTERVENTIONS  - Assess for signs and symptoms of bleeding or hemorrhage  - Monitor labs  - Administer supportive blood products/factors as ordered and appropriate  Outcome: Progressing     Problem: MUSCULOSKELETAL - ADULT  Goal: Maintain or return mobility to safest level of function  Description: INTERVENTIONS:  - Assess patient's ability to carry out ADLs; assess patient's baseline for ADL function and identify physical deficits which impact ability to perform ADLs (bathing, care of mouth/teeth, toileting, grooming, dressing, etc.)  - Assess/evaluate cause of self-care deficits   - Assess range of motion  - Assess patient's mobility  - Assess patient's need for assistive devices and provide as appropriate  - Encourage maximum independence but intervene and supervise when necessary  - Involve family in performance of ADLs  - Assess for home care needs following discharge   - Consider OT consult to assist with ADL evaluation and planning for discharge  - Provide patient education as appropriate  Outcome: Progressing  Goal: Maintain proper alignment of affected body part  Description: INTERVENTIONS:  - Support, maintain and protect limb and body alignment  - Provide patient/ family with appropriate education  Outcome: Progressing     Problem: PAIN - ADULT  Goal: Verbalizes/displays adequate comfort level or baseline comfort level  Description: Interventions:  - Encourage patient to monitor pain and request assistance  - Assess pain using appropriate pain scale  - Administer analgesics based on type and severity of pain and evaluate response  - Implement non-pharmacological measures as appropriate and evaluate response  - Consider cultural and social influences on pain and pain management  - Notify physician/advanced practitioner if interventions unsuccessful or patient reports new pain  Outcome: Progressing     Problem: INFECTION - ADULT  Goal: Absence or prevention of progression during hospitalization  Description: INTERVENTIONS:  - Assess and monitor for signs and symptoms of infection  - Monitor lab/diagnostic results  - Monitor all insertion sites, i.e. indwelling lines, tubes, and drains  - Monitor endotracheal if appropriate and nasal secretions for changes in amount and color  - [place] appropriate cooling/warming therapies per order  - Administer medications as ordered  - Instruct and encourage patient and family to use good hand hygiene technique  - Identify and instruct in appropriate isolation precautions for identified infection/condition  Outcome: Progressing     Problem: SAFETY ADULT  Goal: Maintain or return to baseline ADL function  Description: INTERVENTIONS:  -  Assess patient's ability to carry out ADLs; assess patient's baseline for ADL function and identify physical deficits which impact ability to perform ADLs (bathing, care of mouth/teeth, toileting, grooming, dressing, etc.)  - Assess/evaluate cause of self-care deficits   - Assess range of motion  - Assess patient's mobility; develop plan if impaired  - Assess patient's need for assistive devices and provide as appropriate  - Encourage maximum independence but intervene and supervise when necessary  - Involve family in performance of ADLs  - Assess for home care needs following discharge   - Consider OT consult to assist with ADL evaluation and planning for discharge  - Provide patient education as appropriate  Outcome: Progressing  Goal: Maintains/Returns to pre admission functional level  Description: INTERVENTIONS:  - Perform BMAT or MOVE assessment daily.   - Set and communicate daily mobility goal to care team and patient/family/caregiver. - Collaborate with rehabilitation services on mobility goals if consulted  - Perform Range of Motion 4 times a day. - Reposition patient every 2 hours. -- Record patient progress and toleration of activity level   Outcome: Progressing  Goal: Patient will remain free of falls  Description: INTERVENTIONS:  - Educate patient/family on patient safety including physical limitations  - Instruct patient to call for assistance with activity   - Consult OT/PT to assist with strengthening/mobility   - Keep Call bell within reach  - Keep bed low and locked with side rails adjusted as appropriate  - Keep care items and personal belongings within reach  - Initiate and maintain comfort rounds  - Make Fall Risk Sign visible to staff  - Offer Toileting every 2  Hours, in advance of need  - Initiate/Maintain bed and chair alarm  - Apply yellow socks and bracelet for high fall risk patients  - Consider moving patient to room near nurses station  Outcome: Progressing     Problem: Knowledge Deficit  Goal: Patient/family/caregiver demonstrates understanding of disease process, treatment plan, medications, and discharge instructions  Description: Complete learning assessment and assess knowledge base.   Interventions:  - Provide teaching at level of understanding  - Provide teaching via preferred learning methods  Outcome: Progressing     Problem: RESPIRATORY - ADULT  Goal: Achieves optimal ventilation and oxygenation  Description: INTERVENTIONS:  - Assess for changes in respiratory status  - Assess for changes in mentation and behavior  - Position to facilitate oxygenation and minimize respiratory effort  - Oxygen administered by appropriate delivery if ordered  - Encourage broncho-pulmonary hygiene including cough, deep breathe, I  - Assess the need for suctioning and aspirate as needed  - Assess and instruct to report SOB or any respiratory difficulty  - Respiratory Therapy support as indicated  Outcome: Progressing     Problem: Nutrition/Hydration-ADULT  Goal: Nutrient/Hydration intake appropriate for improving, restoring or maintaining nutritional needs  Description: Monitor and assess patient's nutrition/hydration status for malnutrition. Collaborate with interdisciplinary team and initiate plan and interventions as ordered. Monitor patient's weight and dietary intake as ordered or per policy. Utilize nutrition screening tool and intervene as necessary. Determine patient's food preferences and provide high-protein, high-caloric foods as appropriate.      INTERVENTIONS:  - Monitor urinary output, labs, and treatment plans  - Assess nutrition and hydration status and recommend course of action  - Recommend, monitor, and adjust tube feedings based on assessed needs  - Assess need for intravenous fluids  - Provide specific nutrition/hydration education as appropriate  - Include patient/family/caregiver in decisions related to nutrition  Outcome: Progressing

## 2023-09-14 NOTE — ASSESSMENT & PLAN NOTE
Previous echocardiogram 6/2/23 showed EF 54%, normal diastolic dysfunction, mild left ventricular hypertrophy, mild RVSP elevation and mild TR  · PTA Med: Torsemide 20 mg daily  · Evaluated by Heart Failure team in July 2023.  LVH thought to be secondary to hypertension, obesity, hx of meth use  · Recommended sleep study at that time   · Echo this admission shows normal EF of 60%, normal LV thickness, no diastolic dysfunction

## 2023-09-15 ENCOUNTER — APPOINTMENT (INPATIENT)
Dept: NEUROLOGY | Facility: HOSPITAL | Age: 38
DRG: 005 | End: 2023-09-15
Attending: PSYCHIATRY & NEUROLOGY
Payer: COMMERCIAL

## 2023-09-15 LAB
DME PARACHUTE DELIVERY DATE REQUESTED: NORMAL
DME PARACHUTE DELIVERY DATE REQUESTED: NORMAL
DME PARACHUTE DELIVERY NOTE: NORMAL
DME PARACHUTE ITEM DESCRIPTION: NORMAL
DME PARACHUTE ORDER STATUS: NORMAL
DME PARACHUTE ORDER STATUS: NORMAL
DME PARACHUTE SUPPLIER NAME: NORMAL
DME PARACHUTE SUPPLIER NAME: NORMAL
DME PARACHUTE SUPPLIER PHONE: NORMAL
DME PARACHUTE SUPPLIER PHONE: NORMAL

## 2023-09-15 PROCEDURE — 99233 SBSQ HOSP IP/OBS HIGH 50: CPT | Performed by: PSYCHIATRY & NEUROLOGY

## 2023-09-15 PROCEDURE — 99232 SBSQ HOSP IP/OBS MODERATE 35: CPT | Performed by: STUDENT IN AN ORGANIZED HEALTH CARE EDUCATION/TRAINING PROGRAM

## 2023-09-15 PROCEDURE — 95885 MUSC TST DONE W/NERV TST LIM: CPT | Performed by: PSYCHIATRY & NEUROLOGY

## 2023-09-15 PROCEDURE — 97110 THERAPEUTIC EXERCISES: CPT

## 2023-09-15 PROCEDURE — 95887 MUSC TST DONE W/N TST NONEXT: CPT | Performed by: PSYCHIATRY & NEUROLOGY

## 2023-09-15 PROCEDURE — 94669 MECHANICAL CHEST WALL OSCILL: CPT

## 2023-09-15 PROCEDURE — 95886 MUSC TEST DONE W/N TEST COMP: CPT | Performed by: PSYCHIATRY & NEUROLOGY

## 2023-09-15 PROCEDURE — 95911 NRV CNDJ TEST 9-10 STUDIES: CPT | Performed by: PSYCHIATRY & NEUROLOGY

## 2023-09-15 PROCEDURE — 94760 N-INVAS EAR/PLS OXIMETRY 1: CPT

## 2023-09-15 PROCEDURE — 94668 MNPJ CHEST WALL SBSQ: CPT

## 2023-09-15 PROCEDURE — 94640 AIRWAY INHALATION TREATMENT: CPT

## 2023-09-15 PROCEDURE — NC001 PR NO CHARGE: Performed by: STUDENT IN AN ORGANIZED HEALTH CARE EDUCATION/TRAINING PROGRAM

## 2023-09-15 PROCEDURE — 94003 VENT MGMT INPAT SUBQ DAY: CPT

## 2023-09-15 RX ORDER — SODIUM CHLORIDE FOR INHALATION 0.9 %
3 VIAL, NEBULIZER (ML) INHALATION
Status: DISCONTINUED | OUTPATIENT
Start: 2023-09-15 | End: 2023-09-15

## 2023-09-15 RX ADMIN — ALBUTEROL SULFATE 2.5 MG: 2.5 SOLUTION RESPIRATORY (INHALATION) at 07:54

## 2023-09-15 RX ADMIN — ISODIUM CHLORIDE 3 ML: 0.03 SOLUTION RESPIRATORY (INHALATION) at 15:30

## 2023-09-15 RX ADMIN — THIAMINE HCL TAB 100 MG 100 MG: 100 TAB at 08:20

## 2023-09-15 RX ADMIN — CHLORHEXIDINE GLUCONATE 15 ML: 1.2 RINSE ORAL at 21:42

## 2023-09-15 RX ADMIN — ALBUTEROL SULFATE 2.5 MG: 2.5 SOLUTION RESPIRATORY (INHALATION) at 20:00

## 2023-09-15 RX ADMIN — APIXABAN 5 MG: 5 TABLET, FILM COATED ORAL at 17:09

## 2023-09-15 RX ADMIN — ALBUTEROL SULFATE 2.5 MG: 2.5 SOLUTION RESPIRATORY (INHALATION) at 00:19

## 2023-09-15 RX ADMIN — ALBUTEROL SULFATE 2.5 MG: 2.5 SOLUTION RESPIRATORY (INHALATION) at 15:30

## 2023-09-15 RX ADMIN — APIXABAN 5 MG: 5 TABLET, FILM COATED ORAL at 08:26

## 2023-09-15 RX ADMIN — CHLORHEXIDINE GLUCONATE 15 ML: 1.2 RINSE ORAL at 08:20

## 2023-09-15 RX ADMIN — Medication 1000 UNITS: at 08:20

## 2023-09-15 RX ADMIN — Medication 20 MG: at 08:21

## 2023-09-15 RX ADMIN — FLUOXETINE 10 MG: 10 CAPSULE ORAL at 08:20

## 2023-09-15 NOTE — PROGRESS NOTES
Recommend Jevity 1.5 at goal rate of 70 mL/hr (for 22 hours due to use of omeprazole) for a total volume of 1540 mL. This will provide 2310 kcals, 98 gms protein and 1170 mL free water. Recommend d/c Prosource as protein is being met through TF. Recommend continuing flushes at 200 mL q 4 hrs for a total fluid intake of 2370 mL. 3 = assistive equipment and person

## 2023-09-15 NOTE — ASSESSMENT & PLAN NOTE
Presented 8/24 with hypoxia, initially admitted to the floor on supplemental NC  · CTA PE: Pulmonary embolism in the right lower lobe segmental pulmonary artery. Measured RV/LV ratio was within normal limits at less than 0.9. · BNP 9, initial HS troponin 9  · Initiated on heparin gtt, transitioned to therapeutic Lovenox on 8/26  · Echo completed 8/25: Left Ventricle: Left ventricular cavity size is normal. Wall thickness is normal. The left ventricular ejection fraction is 60% by visual estimation. . Wall motion is normal. Diastolic function is normal for age. Right Ventricle: Right ventricular cavity size is normal. Systolic function is normal. Normal tricuspid annular plane systolic excursion (TAPSE) > 1.7 cm.   · Dopplers of LE neg for DVT bilaterally  · Continue Eliquis

## 2023-09-15 NOTE — OCCUPATIONAL THERAPY NOTE
OT TREATMENT         09/15/23 0945   OT Last Visit   OT Visit Date 09/15/23   Note Type   Note Type Treatment   Pain Assessment   Pain Assessment Tool 0-10  (pt shook head no to pain question)   Pain Score No Pain   Restrictions/Precautions   Other Precautions Chair Alarm; Bed Alarm; Fall Risk;O2  (vent mask on O2 on trach)   ADL   Grooming Assistance 5  Supervision/Setup   Grooming Deficit Brushing hair; Increased time to complete  (applying chap stick)   Grooming Comments patient completed supine with setup, able to open and close chap stick using B hands   Bed Mobility   Rolling R 2  Maximal assistance   Additional items Bedrails   Rolling L 2  Maximal assistance   Additional items Bedrails   ROM- Right Upper Extremities   R Shoulder AROM; Flexion; Horizontal ABduction   R Elbow AROM;Elbow flexion;Elbow extension   R Wrist AROM; Wrist flexion;Wrist extension   R Hand AROM; Index finger; Thumb; Long finger;Ring finger;Little finger   R Weight/Reps/Sets 10 times each supine   ROM - Left Upper Extremities    L Shoulder AAROM; Flexion; Horizontal ABduction;AROM   L Elbow AROM;Elbow flexion;Elbow extension   L Wrist AROM; Wrist flexion;Wrist extension   L Hand AROM; Index finger; Thumb; Long finger;Little finger;Ring finger   L Weight/Reps/Sets 10 times each supine   LUE ROM Comment patient needed active assistive ROM to shoulder for increased ROM active shoulder flexion to 40 degrees   Cognition   Arousal/Participation Cooperative   Attention Within functional limits   Orientation Level Oriented to person;Oriented to time  (knows September 2023)   Following Commands Follows one step commands without difficulty   Activity Tolerance   Medical Staff Made Aware yes, nurse Blayne Silva   Assessment   Assessment Patient seen for OT treatment. Patient is cooperative and pleasant. Sleeping upon arrival to room. Patients BUE ROM is improving. Patient will benefit from continued OT services to maximize functional performance with ADLS.  The patient's raw score on the AM-PAC Daily Activity Inpatient Short Form is 11. A raw score of less than 19 suggests the patient may benefit from discharge to post-acute rehabilitation services. Please refer to the recommendation of the Occupational Therapist for safe discharge planning. Plan   Treatment Interventions ADL retraining;Functional transfer training;UE strengthening/ROM; Endurance training;Patient/family training;Equipment evaluation/education; Activityengagement; Compensatory technique education   OT Frequency 3-5x/wk   Recommendation   OT Discharge Recommendation Post acute rehabilitation services   AM-PAC Daily Activity Inpatient   Lower Body Dressing 1   Bathing 2   Toileting 2   Upper Body Dressing 2   Grooming 3   Eating 1   Daily Activity Raw Score 11   Daily Activity Standardized Score (Calc for Raw Score >=11) 29.04   AM-PAC Applied Cognition Inpatient   Following a Speech/Presentation 3   Understanding Ordinary Conversation 4   Taking Medications 3   Remembering Where Things Are Placed or Put Away 2   Remembering List of 4-5 Errands 2   Taking Care of Complicated Tasks 2   Applied Cognition Raw Score 16   Applied Cognition Standardized Score 74.70   Licensure   [place] License Number  Damian Net [id number] [facility] OTR/L [id number]

## 2023-09-15 NOTE — ASSESSMENT & PLAN NOTE
Previous echocardiogram 6/23 showed EF 35%, normal diastolic dysfunction, mild left ventricular hypertrophy, mild RVSP elevation and mild TR  · PTA Med: Torsemide 20 mg daily  · Evaluated by Heart Failure team in July 2023.  LVH thought to be secondary to hypertension, obesity, hx of meth use  · Recommended sleep study at that time   · Echo this admission shows normal EF of 60%, normal LV thickness, no diastolic dysfunction

## 2023-09-15 NOTE — ASSESSMENT & PLAN NOTE
Patient initially presented to [facility] on 8/23 with hypoxia and encephalopathy. Acute change in GCS prompted rapid response and subsequent intubation for airway protection. Of note, pt w/ recent hospitalization to [facility] 8/11-8/17 for encephalopathy with unclear etiology despite extensive workup but Wernicke's encephalopathy was on the differential.    ·  [place] workup 8/11:  · Rapid response and intubation on 8/12 for airway protection, extubated on 8/13  · CT head 8/11: No acute intracranial abnormality. Stable small bilateral subcortical hypoattenuating foci. No associated mass effect. · MRI 8/12: No acute intracranial abnormality. Stable nonspecific enhancement along the anterior floor of the third ventricle/hypothalamus compared to March 2023 study. Findings below:  · VEEG 8/12: negative for seizure activity. Possible findings relating to underlying sleep disorder   · LP: Grossly negative overall  · DSPO: Treated with high dose thiamine. Psych evaluated, possible bipolar disorder. Discharged to home with his sister with visiting nurses, ST/PT/OT  · [facility] 8/23: Presented with hypoxia found by visiting nurse SPO2 in 80's  · 8/24: RRT > intubated for airway protection   · Transferred to [facility] on 8/24  · [facility] 8/24 (Current hospital course)  · CT head 8/24: No acute intracranial abnormality  · 8/25 EEG: No evidence of electrographic seizures. Mild background slowing suggestive of underlying cerebral dysfunction from toxic/metablic/infectious/hypoxic encephalopathy. Clinical correlation is recommended  · Ach binding and blocking Ab negative   · MUSK Ab and ACHR modulating Ab neg  · Serum anti-TPO and DARIN Ab's, RPR, neg. CSF paraneoplastic panel neg  · Varicella PCR neg  · Toxoplasma antibodies negative  · Rheum labs negative  · 9/6 repeat LP cultures negative   · 9/7 Completed 7 days of Ceftriaxone to cover for possible neurosyphillis  · MRI 9/9: No acute intracranial pathology.  Unremarkable MRI of the orbits. Improving enhancement of the hypothalamus. 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   · 9/10 completed 3 days pulse dose steroids  · 9/11 completed 5 days IVIG  · [facility] serum test pending  · 9/15 EMG revealed no acute motor neuron disease   · F/u outpatient with neurology in 6-8 weeks ; no clear diagnosis at this time

## 2023-09-15 NOTE — PROGRESS NOTES
Neurology Consult Follow Up      Yenni Santiago is a 41 y.o. male  ICU 01/ICU 01    [id number]        Assessment/Recommendations:    1. Acute respiratory failure with persistent weakness  2. Recent PE  3. Generalized weakness   4. Testicular seminoma   5. Possible wernicke's encephalopathy     Patient is now s/p trach and peg. He does follow commands which hasn't changed since his arrival to [facility]. His mentation is fair. Patient has had very extensive work up without any positive etiology thus far. He's had LP twice with normal csf protein, neg for infection including VDRL, crypto, meningitis panel, [facility] paraneoplastic panel. His serum anti VGCC ab is negative. Csf hypocretin pending. His mri brain has shown improving enhancement of hypothalamus with no new finding. He has been treated for werniicke's but no improvement. His Ach R ab panel, anti musk is negative. Anti tPO, DG, Sjogren's, anca all labs have returned normal.  We had tried 5 days of IVIG and 3 days of IV solumedrol to see if it helps. There is some improvement as he's able to tolerate trach collar but it's not clear. I did EMG bedside today that showed sensory demyelinating and axonal neuropathy with no acute denervation with needle emg including in genioglossus muscle. There was no sign of motor neuron disease. He's in process of being d/c and likely to home. If he worsens, we would be ok to give maintenance IVIG and solumedrol but it should be noted that we do not have any confirmed diagnosis at this time. F/u in 6-8 weeks      Total time of encounter:  50 min  More than 50% of the time was used in counseling and/or coordination of care  Extent of couseling and/or coordination of care    Chief Complaint:  Weakness   Subjective:   Patient has remained stable. He's tolerating trach collar since yesterday well. Upon command, he does move all 4 extremities but when not asked, stays idle.      Past Medical History:   Diagnosis Date   • Anxiety    • Cancer [facility])    • Depression    • Psychiatric disorder     bipolar     Social History     Socioeconomic History   • Marital status: Single     Spouse name: Not on file   • Number of children: Not on file   • Years of education: Not on file   • Highest education level: Not on file   Occupational History   • Not on file   Tobacco Use   • Smoking status: Former     Packs/day: 0.50     Years: 5.00     Total pack years: 2.50     Types: Cigarettes     Start date: 1/1/2008   • Smokeless tobacco: Never   Vaping Use   • Vaping Use: Never used   Substance and Sexual Activity   • Alcohol use: Not Currently     Comment: 2 cases of beer daily, stopped 3 years ago   • Drug use: Yes     Types: Marijuana     Comment: Medical marijuana   • Sexual activity: Not Currently   Other Topics Concern   • Not on file   Social History Narrative    ** Merged History Encounter **          Social Determinants of Health     Financial Resource Strain: Not on file   Food Insecurity: No Food Insecurity (8/29/2023)    Hunger Vital Sign    • Worried About Running Out of Food in the Last Year: Never true    • Ran Out of Food in the Last Year: Never true   Transportation Needs: No Transportation Needs (8/29/2023)    PRAPARE - Transportation    • Lack of Transportation (Medical): No    • Lack of Transportation (Non-Medical): No   Physical Activity: Not on file   Stress: Not on file   Social Connections: Not on file   Intimate Partner Violence: Not on file   Housing Stability: Low Risk  (8/12/2023)    Housing Stability Vital Sign    • Unable to Pay for Housing in the Last Year: No    • Number of Places Lived in the Last Year: 1    • Unstable Housing in the Last Year: No     Family History   Problem Relation Age of Onset   • Coronary artery disease Maternal Aunt        ROS:  Please see HPI for positive symptoms.    erik      Objective:  /76   Pulse 81   Temp 98.6 °F (37 °C) (Temporal)   Resp (!) 23   Ht 6' 4" (1.93 m)   Wt 129 kg (285 lb 0.9 oz)   SpO2 99%   BMI 34.70 kg/m²     General: alert   Mental status: oriented to person  Attention: normal  Knowledge: erik, on trach  Language and Speech: erik, whispers with trach  Cranial nerves: II-XII intact  Muscle tone: normal  Motor strength:  3/5 in all 4. Sensory: grossly intact   Gait: erik  Coordination: erik  Reflexes: 2+ throughout  Except depressed at right knee      Labs:      Lab Results   Component Value Date    WBC 5.57 09/09/2023    HGB 10.8 (L) 09/09/2023    HCT 34.2 (L) 09/09/2023    MCV 99 (H) 09/09/2023     09/09/2023     Lab Results   Component Value Date    HGBA1C 5.2 12/10/2022     Lab Results   Component Value Date    ALT 55 (H) 08/26/2023    AST 23 08/26/2023    ALKPHOS 90 08/26/2023     Lab Results   Component Value Date    GLUCOSE 93 07/28/2023    CALCIUM 8.8 09/14/2023    K 4.3 09/14/2023    CO2 32 09/14/2023     09/14/2023    BUN 16 09/14/2023    CREATININE 0.57 (L) 09/14/2023         Review of reports and notes reveal:     MRI brain and orbits wo and w contrast    Result Date: 9/9/2023  No acute intracranial pathology. Unremarkable MRI of the orbits. Improving enhancement of the hypothalamus. 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 Workstation performed: [id number]               Thank you for this consult.           Amanda Peguero MD  [facility]  [address] [place] [address]  [phone]

## 2023-09-15 NOTE — ASSESSMENT & PLAN NOTE
· 8/12-8/13: intubated for airway protection when acutely had GCS of 5 at [facility]  · 8/23 CTA PE study: PE in the right lower lobe segmental pulmonary artery  · 8/24 RRT > intubated for hypoxia + airway protection   · 8/25: Bronchoscopy: poorly tolerated secondary to hypoxia. Some mucus plugging present on the left.   · 8/26: CT chest: Complete right lower lobe and near complete left lower lobe atelectasis + edema  · 8/31: Completed Zosyn x 7 days   · 9/8: Luwanna [place] placed    Plan:  · Continue trach collar   · Continue pulmonary hygiene   · Daily NIF's  · VAP ppx; chlorhexidine, PPI, HOB greater than 30 degrees  · Plan for d/c home Monday

## 2023-09-15 NOTE — PROGRESS NOTES
[address]  Progress Note  Name: Ena Carmona  MRN: [id number]  Unit/Bed#: ICU 01 I Date of Admission: 8/24/2023   Date of Service: 9/15/2023 I Hospital Day: 22    Assessment/Plan   * Acute respiratory failure with hypoxia [facility])  Assessment & Plan  Patient presented with hypoxia 8/23, found to have right lower lobe segmental PE. Initially saturating well on nasal cannula. Rapid response was called 8/24 secondary to hypoxia and altered mental status requiring emergent intubation. Hypoxic post intubation requiring high ventilator settings. · Of note, was intubated during hospital stay at [facility] 8/12-8/13 for airway protection when acutely had GCS of 5  · CTA PE study 8/23: PE in the right lower lobe segmental pulmonary artery  · CXR 8/23 infiltrates  · Strep pneumo/legionella urine antigen negative  · COVID/Flu/RSV negative  · 8/25: Bronchoscopy: poorly tolerated secondary to hypoxia. Some mucus plugging present on the left. · 8/26: CT chest: Complete right lower lobe and near complete left lower lobe atelectasis. Pneumonia not excluded in the appropriate clinical setting. Mild interstitial edema with trace effusions  · Received diuresis with IV Lasix   · 8/31 Completed Zosyn x 7 days   · 9/8 Trach placed  · 9/11 NIF -17  · Continue PS weans   · Continue pulmonary hygiene   · Daily NIF's  · VAP ppx; chlorhexidine, PPI, HOB greater than 30 degrees  · EMG scheduled for 9/15  · LTACH/Vent rehab planning    Neuromuscular weakness [facility])  Assessment & Plan  Patient initially presented to [facility] on 8/23 with hypoxia and encephalopathy. Acute change in GCS prompted rapid response and subsequent intubation for airway protection.  Of note, pt w/ recent hospitalization to [facility] 8/11-8/17 for encephalopathy with unclear etiology despite extensive workup but Wernicke's encephalopathy was on the differential.    · [facility] [place] workup 8/11:  · Rapid response and intubation on 8/12 for airway protection, extubated on 8/13  · CT head 8/11: No acute intracranial abnormality. Stable small bilateral subcortical hypoattenuating foci. No associated mass effect. · MRI 8/12: No acute intracranial abnormality. Stable nonspecific enhancement along the anterior floor of the third ventricle/hypothalamus compared to March 2023 study. Findings below:  · VEEG 8/12: negative for seizure activity. Possible findings relating to underlying sleep disorder   · LP: Grossly negative overall  · DSPO: Treated with high dose thiamine. Psych evaluated, possible bipolar disorder. Discharged to home with his sister with visiting nurses, ST/PT/OT  · [facility] 8/23: Presented with hypoxia found by visiting nurse SPO2 in 80's  · 8/24: RRT > intubated for airway protection   · Transferred to [facility] on 8/24  · [facility] 8/24 (Current hospital course)  · CT head 8/24: No acute intracranial abnormality  · 8/25 EEG: No evidence of electrographic seizures. Mild background slowing suggestive of underlying cerebral dysfunction from toxic/metablic/infectious/hypoxic encephalopathy. Clinical correlation is recommended  · On neurological exam: wakes to voice, follows 2 step commands   · Continues to have global weakness with poor NIF's unable to wean from ventilator   · Ach binding and blocking Ab negative   · MUSK Ab and ACHR modulating Ab neg  · Serum anti-TPO and DARIN Ab's, RPR, neg. CSF paraneoplastic panel neg  · Varicella PCR neg  · Toxoplasma antibodies negative  · Rheum labs negative  · 9/6 repeat LP cultures negative   · 9/7 Completed 7 days of Ceftriaxone to cover for possible neurosyphillis  · MRI 9/9: No acute intracranial pathology. Unremarkable MRI of the orbits. Improving enhancement of the hypothalamus. 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   · 9/10 completed 3 days pulse dose steroids  · 9/11 completed 5 days IVIG  · Neurology following  · VGCC serum test pending, Hypocretin CSF pending · Continue thiamine  · NIF daily   · EMG Friday 9/15  · Neurochecks per routine     Acute pulmonary embolism without acute cor pulmonale [facility])  Assessment & Plan  Presented 8/24 with hypoxia, initially admitted to the floor on supplemental NC  · CTA PE: Pulmonary embolism in the right lower lobe segmental pulmonary artery. Measured RV/LV ratio was within normal limits at less than 0.9. · BNP 9, initial HS troponin 9  · Initiated on heparin gtt, transitioned to therapeutic Lovenox on 8/26  · Echo completed 8/25: Left Ventricle: Left ventricular cavity size is normal. Wall thickness is normal. The left ventricular ejection fraction is 60% by visual estimation. . Wall motion is normal. Diastolic function is normal for age. Right Ventricle: Right ventricular cavity size is normal. Systolic function is normal. Normal tricuspid annular plane systolic excursion (TAPSE) > 1.7 cm. · Dopplers of LE neg for DVT bilaterally  · Continue Eliquis     Status post tracheostomy [facility])  Assessment & Plan  · Trach care    S/P percutaneous endoscopic gastrostomy (PEG) tube placement [facility])  Assessment & Plan  · Continue tube feeds at goal     History of LVH (left ventricular hypertrophy)  Assessment & Plan  Previous echocardiogram 6/2/23 showed EF 07%, normal diastolic dysfunction, mild left ventricular hypertrophy, mild RVSP elevation and mild TR  · PTA Med: Torsemide 20 mg daily  · Evaluated by Heart Failure team in July 2023. LVH thought to be secondary to hypertension, obesity, hx of meth use  · Recommended sleep study at that time   · Echo this admission shows normal EF of 60%, normal LV thickness, no diastolic dysfunction    Depression  Assessment & Plan  · Fluoxetine 10 mg daily  · Consider additional psych consult     Seminoma of left testis [facility])  Assessment & Plan  · Testicular cancer s/p left orchiectomy on 12/2022  · Repeat CT scan in January 2023 with enlarging lymph node.   · Began Chemo in March 2023: Etoposide and Cisplatin with plan for 4 cycles, 5 days every 21 days  · Did not complete chemo treatment due to adverse effects  · Stopped Cisplatin after 1 cycle due to painful neuropathy  · Continued Etoposide 2nd cycle  · Resumed Cisplatin and Etoposide 3rd cycle  · After 3rd cycle reported double vision, labile affect and right sided weakness and therefore chemotherapy was stopped on 5/26/23  · Continue outpatient follow up with Heme/Onc     Hypertension  Assessment & Plan  Hx of hypertension. PTA was on Torsemide 20 mg daily  · Patient has been normotensive  · Remains off anti-hypertensives     Umbilical hernia without obstruction and without gangrene  Assessment & Plan  · Umbilical hernia present, easily reducible            ICU Core Measures     Vented Patient  VAP Bundle  VAP bundle ordered     A: Assess, Prevent, and Manage Pain · Has pain been assessed? Yes  · Need for changes to pain regimen? NA   B: Both Spontaneous Awakening Trials (SATs) and Spontaneous Breathing Trials (SBTs) · Plan to perform spontaneous awakening trial today? Yes   · Plan to perform spontaneous breathing trial today? Yes   · Obvious barriers to extubation? Yes   C: Choice of Sedation · RASS Goal: 0 Alert and Calm  · Need for changes to sedation or analgesia regimen? NA   D: Delirium · CAM-ICU: Negative   E: Early Mobility  · Plan for early mobility? Yes   F: Family Engagement · Plan for family engagement today? Yes       Review of Invasive Devices:      Central access plan: Will obtain peripheral access and discontinue prior to transfer      Prophylaxis:  VTE VTE covered by:  apixaban, Oral, 5 mg at 09/14/23 1704       Stress Ulcer  covered byomeprazole (PRILOSEC) suspension 2 mg/mL [[id number]]       Subjective   HPI/24hr events: No acute events overnight. Patient was on trach collar from 1100 to 2100 without difficulty. He was switched back to [facility] to rest overnight.       Review of Systems   Unable to perform ROS: Patient nonverbal Objective                            Vitals I/O      Most Recent Min/Max in 24hrs   Temp 98.2 °F (36.8 °C) Temp  Min: 97.2 °F (36.2 °C)  Max: 99.6 °F (37.6 °C)   Pulse 66 Pulse  Min: 66  Max: 94   Resp 18 Resp  Min: 13  Max: 39   BP 98/59 BP  Min: 98/59  Max: 119/80   O2 Sat 98 % SpO2  Min: 93 %  Max: 100 %      Intake/Output Summary (Last 24 hours) at 9/15/2023 0544  Last data filed at 2023 1701  Gross per 24 hour   Intake 2760 ml   Output --   Net 2760 ml         Diet Enteral/Parenteral; Tube Feeding No Oral Diet; Jevity 1.2 Scott; Continuous; 70; Prosource Protein Liquid - One Packet Daily; 200; Every 4 hours     Invasive Monitoring Physical exam    Physical Exam  Eyes:      General: Vision grossly intact. Extraocular Movements: Extraocular movements intact. Conjunctiva/sclera: Conjunctivae normal.      Pupils: Pupils are equal, round, and reactive to light. HENT:      Head: Normocephalic and atraumatic. Right Ear: Hearing and tympanic membrane normal.      Left Ear: Hearing and tympanic membrane normal.      Mouth/Throat:      Mouth: Mucous membranes are moist.   Cardiovascular:      Rate and Rhythm: Normal rate and regular rhythm. Musculoskeletal:      Right lower le+ Edema present. Left lower le+ Edema present. Comments: Generalized weakness    Abdominal:      General: Bowel sounds are normal.      Palpations: Abdomen is soft. Comments: Tube feeds via PEG tube    Constitutional:       Appearance: He is well-developed and well-nourished. Pulmonary:      Comments: On ACVC  B/L breath sounds diminished but clear   Neurological:      Mental Status: He is alert. Sensory: Sensation is intact. Motor: gross motor function is at baseline for patient. Motor deficit. Comments: 2/5 upper and lower extremities    Genitourinary/Anorectal:     Comments: [place] catheter   Vitals and nursing note reviewed.           Diagnostic Studies      EKG: NSR alarms on Imaging: CT chest wo contrast    Result Date: 8/26/2023  Impression: Complete right lower lobe and near complete left lower lobe atelectasis. Pneumonia not excluded in the appropriate clinical setting. Mild interstitial edema with trace effusions. Hepatic steatosis. Workstation performed: [id number]     Bronchoscopy    Result Date: 8/26/2023  Impression: Atelectasis secondary to mucoid plugging RECOMMENDATION:  There is no recommended follow-up for this procedure. XR chest portable    Result Date: 8/25/2023  Impression: 1. Continued low lung volumes with bibasilar atelectasis and small effusions. 2. Right upper lobe opacification likely due to atelectasis and/or loculated pleural fluid. 3. Lines and tubes as noted. Workstation performed: [id number]     XR abdomen 1 view kub    Result Date: 8/25/2023  Impression: Nonspecific bowel gas pattern. Endogastric tube as noted. Workstation performed: [id number]     XR chest portable ICU    Result Date: 8/25/2023  Impression: New right upper lobe opacification, which may represent atelectasis or pneumonia. Improved linear left midlung atelectasis with stable bilateral lower lung airspace opacities. Workstation performed: [id number]     XR chest 1 view portable    Result Date: 8/25/2023  Impression: Stable position of endotracheal and endogastric tubes. Continued interval improvement in aeration of the left lung with linear bandlike atelectasis at the mid to lower left lung. Workstation performed: [id number]     CT head wo contrast    Result Date: 8/24/2023  Impression: No acute intracranial abnormality. Workstation performed: [id number]     XR chest 1 view portable    Result Date: 8/24/2023  Impression: Interval satisfactory placement of an NG tube. Stable position of the endotracheal tube. Improving aeration of the lower left lung but persistent marked opacity in the upper left lung. Minimal bandlike atelectasis in the right lung.  The study was marked in [facility] for immediate notification. Workstation performed: [id number]     XR chest portable    Result Date: 8/24/2023  Impression: ET tube slightly retracted, terminating 5 cm above the prashant. Persistent complete opacity of the left lung. Persistent patchy right upper lobe infiltrates. Workstation performed: [id number]     XR chest portable    Result Date: 8/24/2023  Impression: New ET tube, tip located 3.5 cm above the prashant. Near complete opacity of the left lung likely related to worsening infiltrate and/or atelectasis. Patchy right upper lobe infiltrates. The study was marked in [facility] for immediate notification. Workstation performed: [id number]     XR abdomen 1 view kub    Result Date: 8/24/2023  Impression: Nonobstructive bowel gas pattern. Workstation performed: [id number]  I have personally reviewed pertinent reports.        Medications:  Scheduled PRN   albuterol, 2.5 mg, Q6H  apixaban, 5 mg, BID  chlorhexidine, 15 mL, Q12H ISAEL  cholecalciferol, 1,000 Units, Daily  FLUoxetine, 10 mg, Daily  omeprazole (PRILOSEC) suspension 2 mg/mL, 20 mg, Daily  thiamine, 100 mg, Daily      acetaminophen, 650 mg, Q4H PRN  polyethylene glycol, 17 g, Daily PRN  senna, 17.6 mg, Daily PRN       Continuous          Labs:    CBC    No recent results  BMP    Recent Labs     09/14/23  0500   SODIUM 140   K 4.3      CO2 32   AGAP 3   BUN 16   CREATININE 0.57*   CALCIUM 8.8       Coags    No recent results     Additional Electrolytes  Recent Labs     09/14/23  0500   MG 2.2   PHOS 3.4          Blood Gas    No recent results  No recent results LFTs  No recent results    Infectious  No recent results  Glucose  Recent Labs     09/14/23  0500   GLUC [id number] [facility] Mami Burnette

## 2023-09-15 NOTE — ASSESSMENT & PLAN NOTE
Patient initially presented to [facility] on 8/23 with hypoxia and encephalopathy. Acute change in GCS prompted rapid response and subsequent intubation for airway protection. Of note, pt w/ recent hospitalization to [facility] 8/11-8/17 for encephalopathy with unclear etiology despite extensive workup but Wernicke's encephalopathy was on the differential.    ·  [place] workup 8/11:  · Rapid response and intubation on 8/12 for airway protection, extubated on 8/13  · CT head 8/11: No acute intracranial abnormality. Stable small bilateral subcortical hypoattenuating foci. No associated mass effect. · MRI 8/12: No acute intracranial abnormality. Stable nonspecific enhancement along the anterior floor of the third ventricle/hypothalamus compared to March 2023 study. Findings below:  · VEEG 8/12: negative for seizure activity. Possible findings relating to underlying sleep disorder   · LP: Grossly negative overall  · DSPO: Treated with high dose thiamine. Psych evaluated, possible bipolar disorder. Discharged to home with his sister with visiting nurses, ST/PT/OT  ·  [place] 8/23: Presented with hypoxia found by visiting nurse SPO2 in 80's  · 8/24: RRT > intubated for airway protection   · Transferred to [facility] on 8/24  ·  Pat Rodríguez 8/24 (Current hospital course)  · CT head 8/24: No acute intracranial abnormality  · 8/25 EEG: No evidence of electrographic seizures. Mild background slowing suggestive of underlying cerebral dysfunction from toxic/metablic/infectious/hypoxic encephalopathy. Clinical correlation is recommended  · On neurological exam: wakes to voice, follows 2 step commands   · Continues to have global weakness with poor NIF's unable to wean from ventilator   · Ach binding and blocking Ab negative   · MUSK Ab and ACHR modulating Ab neg  · Serum anti-TPO and DARIN Ab's, RPR, neg.  CSF paraneoplastic panel neg  · Varicella PCR neg  · Toxoplasma antibodies negative  · Rheum labs negative  · 9/6 repeat LP cultures negative   · 9/7 Completed 7 days of Ceftriaxone to cover for possible neurosyphillis  · MRI 9/9: No acute intracranial pathology. Unremarkable MRI of the orbits. Improving enhancement of the hypothalamus. 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   · 9/10 completed 3 days pulse dose steroids  · 9/11 completed 5 days IVIG  · Neurology following  · VGCC serum test pending, Hypocretin CSF pending   · Continue thiamine  · NIF daily   · EMG Friday 9/15  · Neurochecks per routine

## 2023-09-15 NOTE — PLAN OF CARE
Problem: Prexisting or High Potential for Compromised Skin Integrity  Goal: Skin integrity is maintained or improved  Description: INTERVENTIONS:  - Identify patients at risk for skin breakdown  - Assess and monitor skin integrity  - Assess and monitor nutrition and hydration status  - Monitor labs   - Assess for incontinence   - Turn and reposition patient  - Assist with mobility/ambulation  - Relieve pressure over bony prominences  - Avoid friction and shearing  - Provide appropriate hygiene as needed including keeping skin clean and dry  - Evaluate need for skin moisturizer/barrier cream  - Collaborate with interdisciplinary team   - Patient/family teaching  - Consider wound care consult   Outcome: Progressing     Problem: MOBILITY - ADULT  Goal: Maintain or return to baseline ADL function  Description: INTERVENTIONS:  -  Assess patient's ability to carry out ADLs; assess patient's baseline for ADL function and identify physical deficits which impact ability to perform ADLs (bathing, care of mouth/teeth, toileting, grooming, dressing, etc.)  - Assess/evaluate cause of self-care deficits   - Assess range of motion  - Assess patient's mobility; develop plan if impaired  - Assess patient's need for assistive devices and provide as appropriate  - Encourage maximum independence but intervene and supervise when necessary  - Involve family in performance of ADLs  - Assess for home care needs following discharge   - Consider OT consult to assist with ADL evaluation and planning for discharge  - Provide patient education as appropriate  Outcome: Progressing  Goal: Maintains/Returns to pre admission functional level  Description: INTERVENTIONS:  - Perform BMAT or MOVE assessment daily.   - Set and communicate daily mobility goal to care team and patient/family/caregiver. - Collaborate with rehabilitation services on mobility goals if consulted  - Perform Range of Motion 4 times a day.   - Reposition patient every 2 hours.  -- Record patient progress and toleration of activity level   Outcome: Progressing     Problem: CARDIOVASCULAR - ADULT  Goal: Maintains optimal cardiac output and hemodynamic stability  Description: INTERVENTIONS:  - Monitor I/O, vital signs and rhythm  - Monitor for S/S and trends of decreased cardiac output  - Administer and titrate ordered vasoactive medications to optimize hemodynamic stability  - Assess quality of pulses, skin color and temperature  - Assess for signs of decreased coronary artery perfusion  - Instruct patient to report change in severity of symptoms  Outcome: Progressing  Goal: Absence of cardiac dysrhythmias or at baseline rhythm  Description: INTERVENTIONS:  - Continuous cardiac monitoring, vital signs, obtain 12 lead EKG if ordered  - Administer antiarrhythmic and heart rate control medications as ordered  - Monitor electrolytes and administer replacement therapy as ordered  Outcome: Progressing     Problem: GENITOURINARY - ADULT  Goal: Maintains or returns to baseline urinary function  Description: INTERVENTIONS:  - Assess urinary function  - Encourage oral fluids to ensure adequate hydration if ordered  - Administer IV fluids as ordered to ensure adequate hydration  - Administer ordered medications as needed  - Offer frequent toileting  - Follow urinary retention protocol if ordered  Outcome: Progressing  Goal: Absence of urinary retention  Description: INTERVENTIONS:  - Assess patient’s ability to void and empty bladder  - Monitor I/O  - Bladder scan as needed  - Discuss with physician/AP medications to alleviate retention as needed  - Discuss catheterization for long term situations as appropriate  Outcome: Progressing     Problem: METABOLIC, FLUID AND ELECTROLYTES - ADULT  Goal: Electrolytes maintained within normal limits  Description: INTERVENTIONS:  - Monitor labs and assess patient for signs and symptoms of electrolyte imbalances  - Administer electrolyte replacement as ordered  - Monitor response to electrolyte replacements, including repeat lab results as appropriate  - Instruct patient on fluid and nutrition as appropriate  Outcome: Progressing  Goal: Fluid balance maintained  Description: INTERVENTIONS:  - Monitor labs   - Monitor I/O and WT  - Instruct patient on fluid and nutrition as appropriate  - Assess for signs & symptoms of volume excess or deficit  Outcome: Progressing     Problem: SKIN/TISSUE INTEGRITY - ADULT  Goal: Skin Integrity remains intact(Skin Breakdown Prevention)  Description: Assess:  -Perform Adria assessment every shift   -Clean and moisturize skin every 2 hours and PRN   -Inspect skin when repositioning, toileting, and assisting with ADLS  -Assess under medical devices such as cardiac monitor wires IV tubing every 2 hours and PRN   -Assess extremities for adequate circulation and sensation     Bed Management:  -Have minimal linens on bed & keep smooth, unwrinkled  -Change linens as needed when moist or perspiring  -Avoid sitting or lying in one position for more than 2 hours while in bed  -Keep HOB at 30degrees     Toileting:  -Offer bedside commode  -Assess for incontinence every 2 hours  -Use incontinent care products after each incontinent episode such as pads    Activity:  -Mobilize patient 3 times a day  -Encourage or provide ROM exercises   -Turn and reposition patient every 2 Hours  -Use appropriate equipment to lift or move patient in bed  -Instruct/ Assist with weight shifting every hour when out of bed in chair      Skin Care:  -Avoid use of baby powder, tape, friction and shearing, hot water or constrictive clothing  -Relieve pressure over bony prominences using foam dressing  -Do not massage red bony areas    Next Steps:  -Consider consults to  interdisciplinary teams such as PT/OT  Outcome: Progressing  Goal: Pressure injury heals and does not worsen  Description: Interventions:  - Implement low air loss mattress or specialty surface (Criteria met)  - Apply silicone foam dressing  - Instruct/assist with weight shifting every 60  minutes when in chair   - Limit chair time to 2 hour intervals  - Use special pressure reducing interventions such as EHOB when in chair   - Apply fecal or urinary incontinence containment device   - Perform passive or active ROM every 2 hours   - Turn and reposition patient & offload bony prominences every 2 hours   - Utilize friction reducing device or surface for transfers   - Consider consults to  interdisciplinary teams such as PT/OT  - Use incontinent care products after each incontinent episode such as pads   - Consider nutrition services referral as needed  Outcome: Progressing     Problem: HEMATOLOGIC - ADULT  Goal: Maintains hematologic stability  Description: INTERVENTIONS  - Assess for signs and symptoms of bleeding or hemorrhage  - Monitor labs  - Administer supportive blood products/factors as ordered and appropriate  Outcome: Progressing     Problem: MUSCULOSKELETAL - ADULT  Goal: Maintain or return mobility to safest level of function  Description: INTERVENTIONS:  - Assess patient's ability to carry out ADLs; assess patient's baseline for ADL function and identify physical deficits which impact ability to perform ADLs (bathing, care of mouth/teeth, toileting, grooming, dressing, etc.)  - Assess/evaluate cause of self-care deficits   - Assess range of motion  - Assess patient's mobility  - Assess patient's need for assistive devices and provide as appropriate  - Encourage maximum independence but intervene and supervise when necessary  - Involve family in performance of ADLs  - Assess for home care needs following discharge   - Consider OT consult to assist with ADL evaluation and planning for discharge  - Provide patient education as appropriate  Outcome: Progressing  Goal: Maintain proper alignment of affected body part  Description: INTERVENTIONS:  - Support, maintain and protect limb and body alignment  - Provide patient/ family with appropriate education  Outcome: Progressing     Problem: PAIN - ADULT  Goal: Verbalizes/displays adequate comfort level or baseline comfort level  Description: Interventions:  - Encourage patient to monitor pain and request assistance  - Assess pain using appropriate pain scale  - Administer analgesics based on type and severity of pain and evaluate response  - Implement non-pharmacological measures as appropriate and evaluate response  - Consider cultural and social influences on pain and pain management  - Notify physician/advanced practitioner if interventions unsuccessful or patient reports new pain  Outcome: Progressing     Problem: INFECTION - ADULT  Goal: Absence or prevention of progression during hospitalization  Description: INTERVENTIONS:  - Assess and monitor for signs and symptoms of infection  - Monitor lab/diagnostic results  - Monitor all insertion sites, i.e. indwelling lines, tubes, and drains  - Monitor endotracheal if appropriate and nasal secretions for changes in amount and color  - [place] appropriate cooling/warming therapies per order  - Administer medications as ordered  - Instruct and encourage patient and family to use good hand hygiene technique  - Identify and instruct in appropriate isolation precautions for identified infection/condition  Outcome: Progressing     Problem: SAFETY ADULT  Goal: Maintain or return to baseline ADL function  Description: INTERVENTIONS:  -  Assess patient's ability to carry out ADLs; assess patient's baseline for ADL function and identify physical deficits which impact ability to perform ADLs (bathing, care of mouth/teeth, toileting, grooming, dressing, etc.)  - Assess/evaluate cause of self-care deficits   - Assess range of motion  - Assess patient's mobility; develop plan if impaired  - Assess patient's need for assistive devices and provide as appropriate  - Encourage maximum independence but intervene and supervise when necessary  - Involve family in performance of ADLs  - Assess for home care needs following discharge   - Consider OT consult to assist with ADL evaluation and planning for discharge  - Provide patient education as appropriate  Outcome: Progressing  Goal: Maintains/Returns to pre admission functional level  Description: INTERVENTIONS:  - Perform BMAT or MOVE assessment daily.   - Set and communicate daily mobility goal to care team and patient/family/caregiver. - Collaborate with rehabilitation services on mobility goals if consulted  - Perform Range of Motion 4 times a day. - Reposition patient every 2 hours. -- Record patient progress and toleration of activity level   Outcome: Progressing  Goal: Patient will remain free of falls  Description: INTERVENTIONS:  - Educate patient/family on patient safety including physical limitations  - Instruct patient to call for assistance with activity   - Consult OT/PT to assist with strengthening/mobility   - Keep Call bell within reach  - Keep bed low and locked with side rails adjusted as appropriate  - Keep care items and personal belongings within reach  - Initiate and maintain comfort rounds  - Make Fall Risk Sign visible to staff  - Offer Toileting every 2  Hours, in advance of need  - Initiate/Maintain bed and chair alarm  - Apply yellow socks and bracelet for high fall risk patients  - Consider moving patient to room near nurses station  Outcome: Progressing     Problem: Knowledge Deficit  Goal: Patient/family/caregiver demonstrates understanding of disease process, treatment plan, medications, and discharge instructions  Description: Complete learning assessment and assess knowledge base.   Interventions:  - Provide teaching at level of understanding  - Provide teaching via preferred learning methods  Outcome: Progressing     Problem: RESPIRATORY - ADULT  Goal: Achieves optimal ventilation and oxygenation  Description: INTERVENTIONS:  - Assess for changes in respiratory status  - Assess for changes in mentation and behavior  - Position to facilitate oxygenation and minimize respiratory effort  - Oxygen administered by appropriate delivery if ordered  - Encourage broncho-pulmonary hygiene including cough, deep breathe, I  - Assess the need for suctioning and aspirate as needed  - Assess and instruct to report SOB or any respiratory difficulty  - Respiratory Therapy support as indicated  Outcome: Progressing     Problem: Nutrition/Hydration-ADULT  Goal: Nutrient/Hydration intake appropriate for improving, restoring or maintaining nutritional needs  Description: Monitor and assess patient's nutrition/hydration status for malnutrition. Collaborate with interdisciplinary team and initiate plan and interventions as ordered. Monitor patient's weight and dietary intake as ordered or per policy. Utilize nutrition screening tool and intervene as necessary. Determine patient's food preferences and provide high-protein, high-caloric foods as appropriate.      INTERVENTIONS:  - Monitor urinary output, labs, and treatment plans  - Assess nutrition and hydration status and recommend course of action  - Recommend, monitor, and adjust tube feedings based on assessed needs  - Assess need for intravenous fluids  - Provide specific nutrition/hydration education as appropriate  - Include patient/family/caregiver in decisions related to nutrition  Outcome: Progressing

## 2023-09-15 NOTE — ASSESSMENT & PLAN NOTE
Previous echocardiogram 6/2/23 showed EF 35%, normal diastolic dysfunction, mild left ventricular hypertrophy, mild RVSP elevation and mild TR  · PTA Med: Torsemide 20 mg daily  · Evaluated by Heart Failure team in July 2023.  LVH thought to be secondary to hypertension, obesity, hx of meth use  · Recommended sleep study at that time   · Echo this admission shows normal EF of 60%, normal LV thickness, no diastolic dysfunction

## 2023-09-15 NOTE — ASSESSMENT & PLAN NOTE
Presented 8/24 with hypoxia, initially admitted to the floor on supplemental NC  · CTA PE: PE in the right lower lobe segmental pulmonary artery. Measured RV/LV ratio was within normal limits at less than 0.9.   · Dopplers of LE neg for DVT bilaterally  · Continue Eliquis

## 2023-09-15 NOTE — PROGRESS NOTES
Critical Care Attending Note; Chichi Marti   Note Date: 09/15/23  Note Time: 7:10 AM    Patient: Bee Segura  Age, : 41 y.o., 1985 MRN: [id number] Code Status: Level 1 - Full Code Patient Location: ICU /ICU    Hospital LOS:22 days  ]   Patient seen and examined, medical record reviewed, discussed with house staff and nursing staff.      HPI   CC: AMS  38M with a PMH of Met L Testicular Seminoma(s/p orchiectomy/chemotherapy), HFpEF, HTN, Obesity, OHS, remote meth/EtOH abuse and depression and recent admission  for AMS(Concerning for Wernickes vs Narcolepsy) who represents with AMS, weakness,  hypoxia reqruiring intubation    Key Recent Events   : Admitted, Intubated  : Trach/PEG, Pulse dose steroids, IVIG     Main ICU Plans:       #Neuro  Weakness  - Unclear Etiology - Negative workup so far(- Ach, Normal CSF protein, paraneoplastic panel) - Showing signs of improvement NIF now -[id number]   - Completed Pulse dose steroid/IVIG   - Neurology Consulted - Appreciate recs  - Follow up rheumatic workup, CSF  - PT/OT   - EMG   - Continue thiamine    #Card  Pulmonary Embolism   - Eliquis    #Pulm  Acute Respiratory Failure - NIF Daily ~ [id number] - Tolerated TCT 9 hours  - TCT daily - secretions will limit patient's success  - Wean FiO2 - Maintain O2 Sat >90%  - Trach Care/Vap Bundle  - Pulm toilet regimen - Albuterol, Chest PT - Start saline nebs      #DVT/GI ppx  - Apixaban    #Lines/Tubes/Drains:   Peripheral IV 23 Right Antecubital (Active)   Number of days: 2       #Nutrition:   Diet Enteral/Parenteral; Tube Feeding No Oral Diet; Jevity 1.2 Scott; Continuous; 70; Prosource Protein Liquid - One Packet Daily; 200; Every 4 hours        #Code Status:   Level 1 - Full Code    #Dispo:   LTAC vs Rehab vs Home         Divina DONIS  Pulmonary, Critical Care    Critical care time, excluding procedures, teaching, family meetings, and excludes any prior time recorded by the AP/resident, 35 minutes. Upon my evaluation, this patient has a high probability of imminent or life-threatening deterioration due to above problems which required my direct attention, intervention, and personal management. Impression/Active Problems:    Testicular Seminoma   HFpEF   HTN   Obesity   OHS   Wernickes encephalopathy   Myopathy   Chronic Respiratory Failure   Increased Secretions    Physical Exam:     Vital Signs:   Weight: 129 kg (285 lb 0.9 oz)  IBW: Ideal body weight: 86.8 kg (191 lb 5.7 oz)  Adjusted ideal body weight: 104 kg (228 lb 13.4 oz)  Temp:  [97.2 °F (36.2 °C)-99.6 °F (37.6 °C)] 98.2 °F (36.8 °C)  HR:  [66-94] 75  Resp:  [13-39] 14  BP: ()/(57-80) 107/70  FiO2 (%):  [35-40] 40  General: NAD  Neuro: Alert, follows commands, 3-4/5 UE weakness improved  Heart: RRR  Lungs: Basilar crackles  Abdomen: Soft NT  Extremities: No Edema                Ventilator Settings:     Vent Mode: AC/VC  FiO2 (%):  [35-40] 40          Invalid input(s): "PCO2", "O2"  Radiologic Images Reviewed:    MRI Brain  No acute intracranial pathology. Unremarkable MRI of the orbits. Improving enhancement of the hypothalamus. 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 CXR  Improved aeration of the bilateral lungs with mild bibasilar atelectasis.      Input / Output:       Intake/Output Summary (Last 24 hours) at 9/15/2023 0710  Last data filed at 9/15/2023 0601  Gross per 24 hour   Intake 2880 ml   Output 350 ml   Net 2530 ml            Infusions:     Scheduled Medications:  Current Facility-Administered Medications   Medication Dose Route Frequency Provider Last Rate   • acetaminophen  650 mg Oral Q4H PRN AMBER Baird     • albuterol  2.5 mg Nebulization Q6H AMBER Baird     • apixaban  5 mg Oral BID AMBER Hadley     • chlorhexidine  15 mL Mouth/Throat Q12H 1545 AMBER Cerrato     • cholecalciferol  1,000 Units Oral Daily Jose J Dietrich MD     • FLUoxetine  10 mg Oral Daily AMBER Avila     • omeprazole (PRILOSEC) suspension 2 mg/mL  20 mg Oral Daily AMBER Avila     • polyethylene glycol  17 g Oral Daily PRN AMBER Avila     • senna  17.6 mg Oral Daily PRN AMBER Avila     • thiamine  100 mg Oral Daily AMBER Avila         PRN Medications:  •  acetaminophen  •  polyethylene glycol  •  senna    Labs Reviewed:  Results from last 7 days   Lab Units 09/09/23  0606   WBC Thousand/uL 5.57   HEMOGLOBIN g/dL 10.8*   HEMATOCRIT % 34.2*   PLATELETS Thousands/uL 234      Results from last 7 days   Lab Units 09/14/23  0500 09/13/23  0535 09/12/23  0529 09/11/23  0532 09/09/23  0606   SODIUM mmol/L 140 142 145 144 148*   CO2 mmol/L 32 29 28 27 27   BUN mg/dL 16 18 22 24 16   CALCIUM mg/dL 8.8 8.4 8.7 9.0 9.2   MAGNESIUM mg/dL 2.2  --  2.1 2.4 2.4   PHOSPHORUS mg/dL 3.4  --   --  2.7 3.5         Invalid input(s): "ASTSGOT", "ALTSGPT"LABRCNTIP@ ,alkphos:3,tbilirubin:3,dbilirubin:3)@      Invalid input(s): "TROPT", "CPK", "PBNP"             I have personally seen and examined the patient on (09/15/23 between [id number]). I discussed the patient with the AP/resident including, but not limited to, verifying findings; reviewing labs and x-rays; discussing with consultants; developing the plan of care with the bedside nurse; and discussing treatment plan with patient or surrogate. I have reviewed the note and assessment performed by the AP/resident and agree with the AP/resident’s documented findings and plan of care with the above additions/exceptions. Please see my comments for details and adjustments.

## 2023-09-15 NOTE — ASSESSMENT & PLAN NOTE
Patient presented with hypoxia 8/23, found to have right lower lobe segmental PE. Initially saturating well on nasal cannula. Rapid response was called 8/24 secondary to hypoxia and altered mental status requiring emergent intubation. Hypoxic post intubation requiring high ventilator settings. · Of note, was intubated during hospital stay at [facility] 8/12-8/13 for airway protection when acutely had GCS of 5  · CTA PE study 8/23: PE in the right lower lobe segmental pulmonary artery  · CXR 8/23 infiltrates  · Strep pneumo/legionella urine antigen negative  · COVID/Flu/RSV negative  · 8/25: Bronchoscopy: poorly tolerated secondary to hypoxia. Some mucus plugging present on the left. · 8/26: CT chest: Complete right lower lobe and near complete left lower lobe atelectasis. Pneumonia not excluded in the appropriate clinical setting.  Mild interstitial edema with trace effusions  · Received diuresis with IV Lasix   · 8/31 Completed Zosyn x 7 days   · 9/8 Trach placed  · 9/11 NIF -17  · Continue PS weans   · Continue pulmonary hygiene   · Daily NIF's  · VAP ppx; chlorhexidine, PPI, HOB greater than 30 degrees  · EMG scheduled for 9/15  · LTACH/Vent rehab planning

## 2023-09-15 NOTE — PROGRESS NOTES
[address] [facility]  Progress Note  Name: Ghada Subramanian  MRN: [id number]  Unit/Bed#: ICU 01 I Date of Admission: 8/24/2023   Date of Service: 9/15/2023 I Hospital Day: 22    Assessment/Plan   * Acute respiratory failure with hypoxia [facility])  Assessment & Plan  · 8/12-8/13: intubated for airway protection when acutely had GCS of 5 at [facility]  · 8/23 CTA PE study: PE in the right lower lobe segmental pulmonary artery  · 8/24 RRT > intubated for hypoxia + airway protection   · 8/25: Bronchoscopy: poorly tolerated secondary to hypoxia. Some mucus plugging present on the left. · 8/26: CT chest: Complete right lower lobe and near complete left lower lobe atelectasis + edema  · 8/31: Completed Zosyn x 7 days   · 9/8: [place] Guppy placed    Plan:  · Continue trach collar   · Continue pulmonary hygiene   · Daily NIF's  · VAP ppx; chlorhexidine, PPI, HOB greater than 30 degrees  · Plan for d/c home Monday     Neuromuscular weakness [facility])  Assessment & Plan  Patient initially presented to [facility] on 8/23 with hypoxia and encephalopathy. Acute change in GCS prompted rapid response and subsequent intubation for airway protection. Of note, pt w/ recent hospitalization to [facility] 8/11-8/17 for encephalopathy with unclear etiology despite extensive workup but Wernicke's encephalopathy was on the differential.    ·  [place] workup 8/11:  · Rapid response and intubation on 8/12 for airway protection, extubated on 8/13  · CT head 8/11: No acute intracranial abnormality. Stable small bilateral subcortical hypoattenuating foci. No associated mass effect. · MRI 8/12: No acute intracranial abnormality. Stable nonspecific enhancement along the anterior floor of the third ventricle/hypothalamus compared to March 2023 study. Findings below:  · VEEG 8/12: negative for seizure activity. Possible findings relating to underlying sleep disorder   · LP: Grossly negative overall  · DSPO: Treated with high dose thiamine.  Psych evaluated, possible bipolar disorder. Discharged to home with his sister with visiting nurses, ST/PT/OT  ·  [place] 8/23: Presented with hypoxia found by visiting nurse SPO2 in 80's  · 8/24: RRT > intubated for airway protection   · Transferred to [facility] on 8/24  · [facility] 8/24 (Current hospital course)  · CT head 8/24: No acute intracranial abnormality  · 8/25 EEG: No evidence of electrographic seizures. Mild background slowing suggestive of underlying cerebral dysfunction from toxic/metablic/infectious/hypoxic encephalopathy. Clinical correlation is recommended  · Ach binding and blocking Ab negative   · MUSK Ab and ACHR modulating Ab neg  · Serum anti-TPO and DARIN Ab's, RPR, neg. CSF paraneoplastic panel neg  · Varicella PCR neg  · Toxoplasma antibodies negative  · Rheum labs negative  · 9/6 repeat LP cultures negative   · 9/7 Completed 7 days of Ceftriaxone to cover for possible neurosyphillis  · MRI 9/9: No acute intracranial pathology. Unremarkable MRI of the orbits. Improving enhancement of the hypothalamus. 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 · 9/10 completed 3 days pulse dose steroids  · 9/11 completed 5 days IVIG  · VGCC serum test pending  · 9/15 EMG revealed no acute motor neuron disease   · F/u outpatient with neurology in 6-8 weeks ; no clear diagnosis at this time    Acute pulmonary embolism without acute cor pulmonale [facility])  Assessment & Plan  Presented 8/24 with hypoxia, initially admitted to the floor on supplemental NC  · CTA PE: PE in the right lower lobe segmental pulmonary artery. Measured RV/LV ratio was within normal limits at less than 0.9.   · Dopplers of LE neg for DVT bilaterally  · Continue Eliquis     Status post tracheostomy [facility])  Assessment & Plan  · Trach care    S/P percutaneous endoscopic gastrostomy (PEG) tube placement [facility])  Assessment & Plan  · Continue tube feeds at goal     History of LVH (left ventricular hypertrophy)  Assessment & Plan  Previous echocardiogram 6/23 showed EF 64%, normal diastolic dysfunction, mild left ventricular hypertrophy, mild RVSP elevation and mild TR  · PTA Med: Torsemide 20 mg daily  · Evaluated by Heart Failure team in July 2023. LVH thought to be secondary to hypertension, obesity, hx of meth use  · Recommended sleep study at that time   · Echo this admission shows normal EF of 60%, normal LV thickness, no diastolic dysfunction    Depression  Assessment & Plan  · Fluoxetine 10 mg daily    Seminoma of left testis [facility])  Assessment & Plan  · Testicular cancer s/p left orchiectomy on 12/2022  · Repeat CT scan in January 2023 with enlarging lymph node. · Began Chemo in March 2023: Etoposide and Cisplatin with plan for 4 cycles, 5 days every 21 days  · Did not complete chemo treatment due to adverse effects  · Stopped Cisplatin after 1 cycle due to painful neuropathy  · Continued Etoposide 2nd cycle  · Resumed Cisplatin and Etoposide 3rd cycle  · After 3rd cycle reported double vision, labile affect and right sided weakness and therefore chemotherapy was stopped on 5/26/23  · Continue outpatient follow up with Heme/Onc     Hypertension  Assessment & Plan  Hx of hypertension. PTA was on Torsemide 20 mg daily  · Patient has been normotensive  · Remains off anti-hypertensives     Umbilical hernia without obstruction and without gangrene  Assessment & Plan  · Umbilical hernia present, easily reducible        ICU Core Measures     Vented Patient  VAP Bundle  VAP bundle ordered     A: Assess, Prevent, and Manage Pain · Has pain been assessed? Yes  · Need for changes to pain regimen? NA   B: Both Spontaneous Awakening Trials (SATs) and Spontaneous Breathing Trials (SBTs) · Plan to perform spontaneous awakening trial today? N/A   · Plan to perform spontaneous breathing trial today? N/A   · Obvious barriers to extubation?  Yes   C: Choice of Sedation · RASS Goal: 0 Alert and Calm or N/A patient not on sedation  · Need for changes to sedation or analgesia regimen? NA   D: Delirium · CAM-ICU: Negative   E: Early Mobility  · Plan for early mobility? Yes   F: Family Engagement · Plan for family engagement today? Yes       Review of Invasive Devices:      Central access plan: port de-accessed      Prophylaxis:  VTE VTE covered by:  apixaban, Oral, 5 mg at 09/15/23 1709       Stress Ulcer  covered byomeprazole (PRILOSEC) suspension 2 mg/mL [[id number]]       Subjective   HPI/24hr events: Patient remains on trach collar with 30% fi02. He underwent EMG which did not reveal any acute denervation. He remains with unclear etiology; however, has improved since receiving IVIG and high dose steroids. Review of Systems   Neurological: Positive for weakness. All other systems reviewed and are negative. Objective                            Vitals I/O      Most Recent Min/Max in 24hrs   Temp 98.4 °F (36.9 °C) Temp  Min: 98.2 °F (36.8 °C)  Max: 98.6 °F (37 °C)   Pulse 95 Pulse  Min: 66  Max: 95   Resp (!) 36 Resp  Min: 14  Max: 40   /74 BP  Min: 96/63  Max: 120/76   O2 Sat 98 % SpO2  Min: 93 %  Max: 100 %      Intake/Output Summary (Last 24 hours) at 9/15/2023 1922  Last data filed at 9/15/2023 1701  Gross per 24 hour   Intake 2880 ml   Output 900 ml   Net 1980 ml         Diet Enteral/Parenteral; Tube Feeding No Oral Diet; Jevity 1.5; Continuous; 70; 200; Every 4 hours     Invasive Monitoring Physical exam    Physical Exam  Eyes:      Conjunctiva/sclera: Conjunctivae normal.      Pupils: Pupils are equal, round, and reactive to light. Skin:     General: Skin is warm and dry. HENT:      Head: Normocephalic. Mouth/Throat:      Mouth: Mucous membranes are moist.   Neck:     Trachea: Tracheostomy present. Cardiovascular:      Rate and Rhythm: Normal rate and regular rhythm. Pulses: Normal pulses. Abdominal:      Palpations: Abdomen is soft.       Comments: Peg tube   Constitutional:       General: He is not in acute distress. Appearance: He is ill-appearing. Pulmonary:      Effort: No accessory muscle usage, respiratory distress or accessory muscle usage. Breath sounds: No wheezing or rales. Comments: Trach collar  Neurological:      Mental Status: He is alert and oriented to person, place and time. Comments: Follows directions  Attempts to mouth words              Diagnostic Studies      EKG:NSR   Imaging:  I have personally reviewed pertinent reports.        Medications:  Scheduled PRN   albuterol, 2.5 mg, Q6H  apixaban, 5 mg, BID  chlorhexidine, 15 mL, Q12H ISAEL  cholecalciferol, 1,000 Units, Daily  FLUoxetine, 10 mg, Daily  omeprazole (PRILOSEC) suspension 2 mg/mL, 20 mg, Daily  thiamine, 100 mg, Daily      acetaminophen, 650 mg, Q4H PRN  polyethylene glycol, 17 g, Daily PRN  senna, 17.6 mg, Daily PRN       Continuous          Labs:    CBC    No recent results  BMP    Recent Labs     09/14/23  0500   SODIUM 140   K 4.3      CO2 32   AGAP 3   BUN 16   CREATININE 0.57*   CALCIUM 8.8       Coags    No recent results     Additional Electrolytes  Recent Labs     09/14/23  0500   MG 2.2   PHOS 3.4          Blood Gas    No recent results  No recent results LFTs  No recent results    Infectious  No recent results  Glucose  Recent Labs     09/14/23  0500   GLUC 655 [facility] Mirella Soto

## 2023-09-15 NOTE — RESPIRATORY THERAPY NOTE
Transported to Vascular lab with JEAN-PAUL Crow for EMG on 35% TC, bed would not fit into procedure room, Dr Susi Herman wanted to do procedure in hallway but the access to wall suction was to far away and was not acceptable as pt often requires tracheal sx, pt was returned back to ICU and placed back on aerosol TC, SpO2 99%, tolerated well

## 2023-09-15 NOTE — ESCALATED TEAM TX
Team Meeting Note    Patient name Edward Lagunas  Location ICU 01/ICU 01 MRN [id number]  : 1985 Date 9/15/2023       Team Meeting  Meeting Type: Tx Team Meeting  Initial Conference Date: 09/15/23    Team Members Present  Team Members Present: Physician,   Physician Team Member: Dr. Vazquez Owen MD, Dr. Maryjo Dolan MD  Social Work Team Member: MARSHA Carvajal    Patient/Family Present  Patient Present: Yes  Patient's Family Present: Yes  Family Relationship: Parent, Sibling, Other (Comment) (Aunt)  Parent: Ashlee Celeste  Sibling: Vincent Olsen    ICU physicians and SW met with pt and family to discuss plans. Vent rehab placement was being recommended. However based on pt's progress on trach collar pt may be able to return home as requested by family. Family expressed concerns about distance of the vent rehab facilities that had been reviewing referral.  Physician recommended monitoring progress over weekend and planning possible transition home early next week. SW reviewed plan for [address] for nursing and therapy services at home. Also discussed D[facility] that [facility]  including trach/suction supplies and tube feed. Claire [place] chair will also be ordered to assist with pt transfers. Family approved of all plans. SW will continue to follow to monitor progress and assist with planning as needed. Suturegard Body: The suture ends were repeatedly re-tightened and re-clamped to achieve the desired tissue expansion.

## 2023-09-15 NOTE — UTILIZATION REVIEW
Continued Stay Review    Date: 9/15/23                          Current Patient Class: inpatient  Current Level of Care: ICU  HPI:38 y.o. male initially admitted on 8/24/2     Assessment/Plan:   Patient was on trach collar from 1100 to 2100 without difficulty. He was switched back to [facility] to rest overnight. Lungs with diminished breath sounds. Tube feedings via PEG changed to Jevity 1.2 from 1.5. Per RD:  Recommend Jevity 1.5 at goal rate of 70 mL/hr (for 22 hours due to use of omeprazole) for a total volume of 1540 mL. This will provide 2310 kcals, 98 gms protein and 1170 mL free water. Recommend d/c Prosource as protein is being met through TF. Recommend continuing flushes at 200 mL q 4 hrs for a total fluid intake of 2370 mL.     Vital Signs:   Date/Time Temp Pulse Resp BP MAP (mmHg) SpO2 FiO2 (%) O2 Flow Rate (L/min) O2 Device Patient Position - Orthostatic VS   09/15/23 1400 -- 81 23 Abnormal  -- -- 100 % -- -- -- --   09/15/23 1300 -- 83 29 Abnormal  -- -- 98 % -- -- -- --   09/15/23 1200 98.6 °F (37 °C) 84 16 120/76 93 98 % -- -- -- --   09/15/23 1100 -- 74 18 119/80 96 100 % -- -- -- --   09/15/23 1000 -- 74 34 Abnormal  101/62 77 96 % -- -- -- --   09/15/23 0900 -- 77 40 Abnormal  102/65 78 96 % -- -- -- --   09/15/23 0810 -- -- -- -- -- 96 % 35 8 L/min Trach mask --   09/15/23 0800 -- 82 22 109/66 82 100 % -- -- -- --   09/15/23 0754 -- -- -- -- -- 98 % -- -- -- --   09/15/23 0700 98.4 °F (36.9 °C) 68 14 96/63 74 99 % 35 -- Trach mask Lying   09/15/23 0600 -- 75 14 107/70 85 99 % -- -- -- --   09/15/23 0500 -- 66 18 98/59 74 98 % -- -- -- --   09/15/23 0400 98.2 °F (36.8 °C) 77 19 99/59 74 97 % 40 -- Ventilator Lying   09/15/23 0314 -- -- -- -- -- 98 % -- -- -- --   09/15/23 0300 -- 72 21 107/69 84 99 % -- -- -- --   09/15/23 0200 -- 76 15 106/65 80 94 % -- -- -- --   09/15/23 0100 -- 88 21 109/62 80 93 % -- -- -- --   09/15/23 0019 -- -- -- -- -- 98 % -- -- -- --   09/15/23 0000 98.5 °F (36.9 °C) 76 17 110/57 77 97 % 40 -- Ventilator Lying     Pertinent Labs/Diagnostic Results:     Results from last 7 days   Lab Units 09/09/23  0606   WBC Thousand/uL 5.57   HEMOGLOBIN g/dL 10.8*   HEMATOCRIT % 34.2*   PLATELETS Thousands/uL 234   NEUTROS ABS Thousands/µL 4.79     Results from last 7 days   Lab Units 09/14/23  0500 09/13/23  0535 09/12/23  0529 09/11/23  0532 09/09/23  0606   SODIUM mmol/L 140 142 145 144 148*   POTASSIUM mmol/L 4.3 3.7 3.7 3.4* 3.6   CHLORIDE mmol/L 105 107 112* 113* 113*   CO2 mmol/L 32 29 28 27 27   ANION GAP mmol/L 3 6 5 4 8   BUN mg/dL 16 18 22 24 16   CREATININE mg/dL 0.57* 0.58* 0.80 0.69 0.68   EGFR ml/min/1.73sq m 132 131 114 122 122   CALCIUM mg/dL 8.8 8.4 8.7 9.0 9.2   CALCIUM, IONIZED mmol/L  --   --   --   --  1.12   MAGNESIUM mg/dL 2.2  --  2.1 2.4 2.4   PHOSPHORUS mg/dL 3.4  --   --  2.7 3.5     Results from last 7 days   Lab Units 09/12/23  0530 09/11/23  2331 09/11/23  1719 09/11/23  1121 09/11/23  0531 09/10/23  2349 09/10/23  1717 09/10/23  1140 09/10/23  0535 09/09/23  2351 09/09/23  1723 09/09/23  1200   POC GLUCOSE mg/dl 91 99 104 116 120 175* 141* 122 173* 141* 153* 126     Results from last 7 days   Lab Units 09/14/23  0500 09/13/23  0535 09/12/23  0529 09/11/23  0532 09/09/23  0606   GLUCOSE RANDOM mg/dL 121 99 89 119 132     Results from last 7 days   Lab Units 09/11/23  0532 09/10/23  1151 09/10/23  0536   PTT seconds 23 85* 90*       Medications:   Scheduled Medications:  albuterol, 2.5 mg, Nebulization, Q6H  apixaban, 5 mg, Oral, BID  chlorhexidine, 15 mL, Mouth/Throat, Q12H ISAEL  cholecalciferol, 1,000 Units, Oral, Daily  FLUoxetine, 10 mg, Oral, Daily  omeprazole (PRILOSEC) suspension 2 mg/mL, 20 mg, Oral, Daily  sodium chloride, 3 mL, Nebulization, Q6H  thiamine, 100 mg, Oral, Daily    PRN Meds:  acetaminophen, 650 mg, Oral, Q4H PRN  polyethylene glycol, 17 g, Oral, Daily PRN  senna, 17.6 mg, Oral, Daily PRN    Discharge Plan: tbd    Network Utilization Review Department  ATTENTION: Please call with any questions or concerns to [phone] and carefully listen to the prompts so that you are directed to the right person. All voicemails are confidential.  Lili Khan all requests for admission clinical reviews, approved or denied determinations and any other requests to dedicated fax number below belonging to the campus where the patient is receiving treatment.  List of dedicated fax numbers for the Facilities:  [place] DENIALS (Administrative/Medical Necessity) [phone] 230[address] [facility] (Maternity/NICU/Pediatrics) [phone]   [address] [place] [address] [phone]   [facility] 1000 [facility] [phone]   1500 [facility] 207 [facility] [address] [phone]   [address] [facility] [address] [phone]

## 2023-09-16 LAB
ARTERIAL PATENCY WRIST A: YES
BASE EXCESS BLDA CALC-SCNC: 6.8 MMOL/L
BODY TEMPERATURE: 98 DEGREES FEHRENHEIT
HCO3 BLDA-SCNC: 32 MMOL/L (ref 22–28)
NON VENT ROOM AIR: 30 %
O2 CT BLDA-SCNC: 17.7 ML/DL (ref 16–23)
OXYHGB MFR BLDA: 95.5 % (ref 94–97)
PCO2 BLDA: 47.8 MM HG (ref 36–44)
PH BLDA: 7.44 [PH] (ref 7.35–7.45)
PO2 BLDA: 85.7 MM HG (ref 75–129)
SPECIMEN SOURCE: ABNORMAL

## 2023-09-16 PROCEDURE — 94760 N-INVAS EAR/PLS OXIMETRY 1: CPT

## 2023-09-16 PROCEDURE — 36600 WITHDRAWAL OF ARTERIAL BLOOD: CPT

## 2023-09-16 PROCEDURE — 94640 AIRWAY INHALATION TREATMENT: CPT

## 2023-09-16 PROCEDURE — 99232 SBSQ HOSP IP/OBS MODERATE 35: CPT | Performed by: STUDENT IN AN ORGANIZED HEALTH CARE EDUCATION/TRAINING PROGRAM

## 2023-09-16 PROCEDURE — 82805 BLOOD GASES W/O2 SATURATION: CPT | Performed by: NURSE PRACTITIONER

## 2023-09-16 PROCEDURE — 94668 MNPJ CHEST WALL SBSQ: CPT

## 2023-09-16 PROCEDURE — 94669 MECHANICAL CHEST WALL OSCILL: CPT

## 2023-09-16 PROCEDURE — NC001 PR NO CHARGE: Performed by: STUDENT IN AN ORGANIZED HEALTH CARE EDUCATION/TRAINING PROGRAM

## 2023-09-16 RX ADMIN — ALBUTEROL SULFATE 2.5 MG: 2.5 SOLUTION RESPIRATORY (INHALATION) at 07:38

## 2023-09-16 RX ADMIN — APIXABAN 5 MG: 5 TABLET, FILM COATED ORAL at 08:20

## 2023-09-16 RX ADMIN — CHLORHEXIDINE GLUCONATE 15 ML: 1.2 RINSE ORAL at 20:35

## 2023-09-16 RX ADMIN — Medication 20 MG: at 08:25

## 2023-09-16 RX ADMIN — FLUOXETINE 10 MG: 10 CAPSULE ORAL at 08:21

## 2023-09-16 RX ADMIN — CHLORHEXIDINE GLUCONATE 15 ML: 1.2 RINSE ORAL at 08:20

## 2023-09-16 RX ADMIN — ALBUTEROL SULFATE 2.5 MG: 2.5 SOLUTION RESPIRATORY (INHALATION) at 12:47

## 2023-09-16 RX ADMIN — ALBUTEROL SULFATE 2.5 MG: 2.5 SOLUTION RESPIRATORY (INHALATION) at 19:32

## 2023-09-16 RX ADMIN — ALBUTEROL SULFATE 2.5 MG: 2.5 SOLUTION RESPIRATORY (INHALATION) at 02:27

## 2023-09-16 RX ADMIN — THIAMINE HCL TAB 100 MG 100 MG: 100 TAB at 08:20

## 2023-09-16 RX ADMIN — Medication 1000 UNITS: at 08:20

## 2023-09-16 RX ADMIN — APIXABAN 5 MG: 5 TABLET, FILM COATED ORAL at 17:43

## 2023-09-16 NOTE — ASSESSMENT & PLAN NOTE
Patient initially presented to [facility] on 8/23 with hypoxia and encephalopathy. Acute change in GCS prompted rapid response and subsequent intubation for airway protection. Of note, pt w/ recent hospitalization to [facility] 8/11-8/17 for encephalopathy with unclear etiology despite extensive workup but Wernicke's encephalopathy was on the differential.    ·  [place] workup 8/11:  · Rapid response and intubation on 8/12 for airway protection, extubated on 8/13  · CT head 8/11: No acute intracranial abnormality. Stable small bilateral subcortical hypoattenuating foci. No associated mass effect. · MRI 8/12: No acute intracranial abnormality. Stable nonspecific enhancement along the anterior floor of the third ventricle/hypothalamus compared to March 2023 study. Findings below:  · VEEG 8/12: negative for seizure activity. Possible findings relating to underlying sleep disorder   · LP: Grossly negative overall  · DSPO: Treated with high dose thiamine. Psych evaluated, possible bipolar disorder. Discharged to home with his sister with visiting nurses, ST/PT/OT  ·  [place] 8/23: Presented with hypoxia found by visiting nurse SPO2 in 80's  · 8/24: RRT > intubated for airway protection   · [facility]red [facility] on 8/24  ·  Helen Hernandez 8/24 (Current hospital course)  · CT head 8/24: No acute intracranial abnormality  · 8/25 EEG: No evidence of electrographic seizures. Mild background slowing suggestive of underlying cerebral dysfunction from toxic/metablic/infectious/hypoxic encephalopathy. Clinical correlation is recommended  · Ach binding and blocking Ab negative   · MUSK Ab and ACHR modulating Ab neg  · Serum anti-TPO and DARIN Ab's, RPR, neg. CSF paraneoplastic panel neg  · Varicella PCR neg  · Toxoplasma antibodies negative  · Rheum labs negative  · 9/6 repeat LP cultures negative   · 9/7 Completed 7 days of Ceftriaxone to cover for possible neurosyphillis  · MRI 9/9: No acute intracranial pathology.  Unremarkable MRI of the orbits. Improving enhancement of the hypothalamus. 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   · 9/10 completed 3 days pulse dose steroids  · 9/11 completed 5 days IVIG  · VGCC serum test pending  · 9/15 EMG revealed no acute motor neuron disease   · F/u outpatient with neurology in 6-8 weeks ; no clear diagnosis at this time

## 2023-09-16 NOTE — PROGRESS NOTES
Critical Care Attending Note; Alannah Masterson   Note Date: 23  Note Time: 8:28 AM    Patient: Venessa Rodriguez  Age, : 41 y.o., 1985 MRN: [id number] Code Status: Level 1 - Full Code Patient Location: ICU 01/ICU 01   Hospital LOS:23 days  ]   Patient seen and examined, medical record reviewed, discussed with house staff and nursing staff. HPI   CC: AMS  38M with a PMH of Met L Testicular Seminoma(s/p orchiectomy/chemotherapy), HFpEF, HTN, Obesity, OHS, remote meth/EtOH abuse and depression and recent admission  for AMS(Concerning for Wernickes vs Narcolepsy) who represents with AMS, weakness,  hypoxia reqruiring intubation    Key Recent Events   : Admitted, Intubated  : Trach/PEG, Pulse dose steroids, IVIG     Main ICU Plans:       #Neuro  Weakness  - Unclear Etiology - Negative workup so far(- Ach, Normal CSF protein, paraneoplastic panel) - Showing signs of improvement NIF now -[id number]   - Completed Pulse dose steroid/IVIG   - Neurology Consulted - Appreciate recs  - Follow up rheumatic workup, CSF  - PT/OT   - Continue thiamine    #Card  Pulmonary Embolism   - Eliquis    #Pulm  Acute Respiratory Failure - NIF Daily ~ [id number] - Tolerating TCT   - Wean FiO2 - Maintain O2 Sat >90%  - Trach Care/Vap Bundle  - Pulm toilet regimen - Albuterol, Chest PT       #DVT/GI ppx  - Apixaban    #Lines/Tubes/Drains:   Peripheral IV 23 Right Antecubital (Active)   Number of days: 2       #Nutrition:   Diet Enteral/Parenteral; Tube Feeding No Oral Diet; Jevity 1.5; Continuous; 70; 200; Every 4 hours        #Code Status:   Level 1 - Full Code    #Dispo:   Plan for home DC next week        Alannah Masterson MD  Pulmonary, Critical Care    Critical care time, excluding procedures, teaching, family meetings, and excludes any prior time recorded by the AP/resident, 35 minutes.  Upon my evaluation, this patient has a high probability of imminent or life-threatening deterioration due to above problems which required my direct attention, intervention, and personal management. Impression/Active Problems:    Testicular Seminoma   HFpEF   HTN   Obesity   OHS   Wernickes encephalopathy   Myopathy   Chronic Respiratory Failure   Increased Secretions    Physical Exam:     Vital Signs:   Weight: 130 kg (286 lb 2.5 oz)  IBW: Ideal body weight: 86.8 kg (191 lb 5.7 oz)  Adjusted ideal body weight: 104 kg (229 lb 4.5 oz)  Temp:  [97.8 °F (36.6 °C)-98.9 °F (37.2 °C)] 98.8 °F (37.1 °C)  HR:  [] 91  Resp:  [16-40] 36  BP: ()/(60-85) 98/71  FiO2 (%):  [30] 30  General: NAD  Neuro: Alert, follows commands, 3-4/5 UE weakness improved  Heart: RRR  Lungs: Basilar crackles  Abdomen: Soft NT  Extremities: No Edema                Ventilator Settings:    FiO2 (%):  [30] 30          Invalid input(s): "PCO2", "O2"  Radiologic Images Reviewed:    MRI Brain  No acute intracranial pathology. Unremarkable MRI of the orbits. Improving enhancement of the hypothalamus. 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 CXR  Improved aeration of the bilateral lungs with mild bibasilar atelectasis.      Input / Output:       Intake/Output Summary (Last 24 hours) at 9/16/2023 0828  Last data filed at 9/16/2023 0825  Gross per 24 hour   Intake 2800 ml   Output 1450 ml   Net 1350 ml            Infusions:     Scheduled Medications:  Current Facility-Administered Medications   Medication Dose Route Frequency Provider Last Rate   • acetaminophen  650 mg Oral Q4H PRN AMBER Fernandez     • albuterol  2.5 mg Nebulization Q6H AMBER Fernandez     • apixaban  5 mg Oral BID AMBER Vides     • chlorhexidine  15 mL Mouth/Throat Q12H 1545 AMBER Cerrato     • cholecalciferol  1,000 Units Oral Daily Micheal Clements MD     • FLUoxetine  10 mg Oral Daily AMBER Mei     • omeprazole (PRILOSEC) suspension 2 mg/mL  20 mg Oral Daily  Lowanda Suitland, CRNP     • polyethylene glycol  17 g Oral Daily PRN  Lowanda Suitland, CRNP     • senna  17.6 mg Oral Daily PRN  Lowanda Suitland, CRNP     • thiamine  100 mg Oral Daily  Lowanda Suitland, CRNP         PRN Medications:  •  acetaminophen  •  polyethylene glycol  •  senna    Labs Reviewed:       Results from last 7 days   Lab Units 09/14/23  0500 09/13/23  0535 09/12/23  0529 09/11/23  0532   SODIUM mmol/L 140 142 145 144   CO2 mmol/L 32 29 28 27   BUN mg/dL 16 18 22 24   CALCIUM mg/dL 8.8 8.4 8.7 9.0   MAGNESIUM mg/dL 2.2  --  2.1 2.4   PHOSPHORUS mg/dL 3.4  --   --  2.7         Invalid input(s): "ASTSGOT", "ALTSGPT"LABRCNTIP@ ,alkphos:3,tbilirubin:3,dbilirubin:3)@      Invalid input(s): "TROPT", "CPK", "PBNP"             I have personally seen and examined the patient on (09/16/23 between [id number]). I discussed the patient with the AP/resident including, but not limited to, verifying findings; reviewing labs and x-rays; discussing with consultants; developing the plan of care with the bedside nurse; and discussing treatment plan with patient or surrogate. I have reviewed the note and assessment performed by the AP/resident and agree with the AP/resident’s documented findings and plan of care with the above additions/exceptions. Please see my comments for details and adjustments.

## 2023-09-16 NOTE — ASSESSMENT & PLAN NOTE
Previous echocardiogram 6/23 showed EF 31%, normal diastolic dysfunction, mild left ventricular hypertrophy, mild RVSP elevation and mild TR  · PTA Med: Torsemide 20 mg daily  · Evaluated by Heart Failure team in July 2023.  LVH thought to be secondary to hypertension, obesity, hx of meth use  · Echo this admission shows normal EF of 60%, normal LV thickness, no diastolic dysfunction  · Remains off torsemide

## 2023-09-16 NOTE — RESPIRATORY THERAPY NOTE
Received trach pt on 30% t/c. Vent on stby. Pt tolerating well, in no distress at this time.  Bvm, obturator, additional trachs at bedside

## 2023-09-16 NOTE — ASSESSMENT & PLAN NOTE
· 8/12-8/13: intubated for airway protection when acutely had GCS of 5 at [facility]  · 8/23 CTA PE study: PE in the right lower lobe segmental pulmonary artery  · 8/24 RRT > intubated for hypoxia + airway protection   · 8/25: Bronchoscopy: poorly tolerated secondary to hypoxia. Some mucus plugging present on the left.   · 8/26: CT chest: Complete right lower lobe and near complete left lower lobe atelectasis + edema  · 8/31: Completed Zosyn x 7 days   · 9/8: Aimee Ace placed    Plan:  · Continue trach collar   · Continue pulmonary hygiene   · Daily NIF's  · VAP ppx; chlorhexidine, PPI, HOB greater than 30 degrees  · Plan for d/c home Monday

## 2023-09-16 NOTE — PROGRESS NOTES
[address] [facility]  Progress Note  Name: Valerie Ortiz  MRN: [id number]  Unit/Bed#: ICU 01 I Date of Admission: 8/24/2023   Date of Service: 9/16/2023 I Hospital Day: 23    Assessment/Plan   * Acute respiratory failure with hypoxia [facility])  Assessment & Plan  · 8/12-8/13: intubated for airway protection when acutely had GCS of 5 at [facility]  · 8/23 CTA PE study: PE in the right lower lobe segmental pulmonary artery  · 8/24 RRT > intubated for hypoxia + airway protection   · 8/25: Bronchoscopy: poorly tolerated secondary to hypoxia. Some mucus plugging present on the left. · 8/26: CT chest: Complete right lower lobe and near complete left lower lobe atelectasis + edema  · 8/31: Completed Zosyn x 7 days   · 9/8: Nicole Dy placed    Plan:  · Continue trach collar   · Continue pulmonary hygiene   · Daily NIF's  · VAP ppx; chlorhexidine, PPI, HOB greater than 30 degrees  · Plan for d/c home Monday     Neuromuscular weakness [facility])  Assessment & Plan  Patient initially presented to [facility] on 8/23 with hypoxia and encephalopathy. Acute change in GCS prompted rapid response and subsequent intubation for airway protection. Of note, pt w/ recent hospitalization to [facility] 8/11-8/17 for encephalopathy with unclear etiology despite extensive workup but Wernicke's encephalopathy was on the differential.    ·  [facility] workup 8/11:  · Rapid response and intubation on 8/12 for airway protection, extubated on 8/13  · CT head 8/11: No acute intracranial abnormality. Stable small bilateral subcortical hypoattenuating foci. No associated mass effect. · MRI 8/12: No acute intracranial abnormality. Stable nonspecific enhancement along the anterior floor of the third ventricle/hypothalamus compared to March 2023 study. Findings below:  · VEEG 8/12: negative for seizure activity. Possible findings relating to underlying sleep disorder   · LP: Grossly negative overall  · DSPO: Treated with high dose thiamine.  Psych evaluated, possible bipolar disorder. Discharged to home with his sister with visiting nurses, ST/PT/OT  · [facility] 8/23: Presented with hypoxia found by visiting nurse SPO2 in 80's  · 8/24: RRT > intubated for airway protection   · Transferred to [facility] on 8/24  · ASHLEY Lam 8/24 (Current hospital course)  · CT head 8/24: No acute intracranial abnormality  · 8/25 EEG: No evidence of electrographic seizures. Mild background slowing suggestive of underlying cerebral dysfunction from toxic/metablic/infectious/hypoxic encephalopathy. Clinical correlation is recommended  · Ach binding and blocking Ab negative   · MUSK Ab and ACHR modulating Ab neg  · Serum anti-TPO and DARIN Ab's, RPR, neg. CSF paraneoplastic panel neg  · Varicella PCR neg  · Toxoplasma antibodies negative  · Rheum labs negative  · 9/6 repeat LP cultures negative   · 9/7 Completed 7 days of Ceftriaxone to cover for possible neurosyphillis  · MRI 9/9: No acute intracranial pathology. Unremarkable MRI of the orbits. Improving enhancement of the hypothalamus. 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 · 9/10 completed 3 days pulse dose steroids  · 9/11 completed 5 days IVIG  · [facility] serum test pending  · 9/15 EMG revealed no acute motor neuron disease   · F/u outpatient with neurology in 6-8 weeks ; no clear diagnosis at this time    Acute pulmonary embolism without acute cor pulmonale (HCC)  Assessment & Plan  · Continue Eliquis     Status post tracheostomy [facility])  Assessment & Plan  · Trach care    S/P percutaneous endoscopic gastrostomy (PEG) tube placement [facility])  Assessment & Plan  · Continue tube feeds at goal     History of LVH (left ventricular hypertrophy)  Assessment & Plan  Previous echocardiogram 6/23 showed EF 87%, normal diastolic dysfunction, mild left ventricular hypertrophy, mild RVSP elevation and mild TR  · PTA Med: Torsemide 20 mg daily  · Evaluated by Heart Failure team in July 2023.  LVH thought to be secondary to hypertension, obesity, hx of meth use  · Echo this admission shows normal EF of 60%, normal LV thickness, no diastolic dysfunction  · Remains off torsemide     Depression  Assessment & Plan  · Fluoxetine 10 mg daily    Seminoma of left testis [facility])  Assessment & Plan  · Testicular cancer s/p left orchiectomy on 12/2022  · Repeat CT scan in January 2023 with enlarging lymph node. · Began Chemo in March 2023: Etoposide and Cisplatin with plan for 4 cycles, 5 days every 21 days  · Did not complete chemo treatment due to adverse effects  · After 3rd cycle reported double vision, labile affect and right sided weakness and therefore chemotherapy was stopped on 5/26/23  · Continue outpatient follow up with Heme/Onc     Hypertension  Assessment & Plan  · Patient has been normotensive. Remains off anti-hypertensives     Umbilical hernia without obstruction and without gangrene  Assessment & Plan  · Umbilical hernia present, easily reducible          ICU Core Measures     Vented Patient  VAP Bundle  VAP bundle ordered     A: Assess, Prevent, and Manage Pain · Has pain been assessed? Yes  · Need for changes to pain regimen? No   B: Both Spontaneous Awakening Trials (SATs) and Spontaneous Breathing Trials (SBTs) · Plan to perform spontaneous awakening trial today? N/A   · Plan to perform spontaneous breathing trial today? N/A   · Obvious barriers to extubation? NA   C: Choice of Sedation · RASS Goal: 0 Alert and Calm or N/A patient not on sedation  · Need for changes to sedation or analgesia regimen? NA   D: Delirium · CAM-ICU: Negative   E: Early Mobility  · Plan for early mobility? Yes   F: Family Engagement · Plan for family engagement today?  Yes       Review of Invasive Devices:      Central access plan: Port not accessed      Prophylaxis:  VTE VTE covered by:  apixaban, Oral, 5 mg at 09/16/23 1743       Stress Ulcer  covered byomeprazole (PRILOSEC) suspension 2 mg/mL [[id number]]       Subjective   HPI/24hr events: Patient continued on trach collar. No other significant events. Review of Systems   Neurological: Positive for weakness. All other systems reviewed and are negative. Objective                            Vitals I/O      Most Recent Min/Max in 24hrs   Temp 99.9 °F (37.7 °C) Temp  Min: 97.8 °F (36.6 °C)  Max: 99.9 °F (37.7 °C)   Pulse 77 Pulse  Min: 77  Max: 100   Resp (!) 28 Resp  Min: 19  Max: 38   /82 BP  Min: 92/60  Max: 132/82   O2 Sat 99 % SpO2  Min: 92 %  Max: 100 %      Intake/Output Summary (Last 24 hours) at 9/16/2023 1857  Last data filed at 9/16/2023 1801  Gross per 24 hour   Intake 2820 ml   Output 900 ml   Net 1920 ml         Diet Enteral/Parenteral; Tube Feeding No Oral Diet; Jevity 1.5; Continuous; 70; 200; Every 4 hours     Invasive Monitoring Physical exam    Physical Exam  Eyes:      Pupils: Pupils are equal, round, and reactive to light. Skin:     General: Skin is warm. HENT:      Mouth/Throat:      Mouth: Mucous membranes are moist.   Neck:     Trachea: Tracheostomy present. Cardiovascular:      Rate and Rhythm: Normal rate and regular rhythm. Pulses: Normal pulses. Abdominal:      Palpations: Abdomen is soft. Comments: Peg tube present   Constitutional:       Appearance: He is obese. Pulmonary:      Effort: No accessory muscle usage, respiratory distress or accessory muscle usage. Breath sounds: No wheezing or rales. Neurological:      Mental Status: He is oriented to person, place and time. Sensory: Sensation is intact. Vitals reviewed. Diagnostic Studies      EKG: NSR  Imaging:  I have personally reviewed pertinent reports.        Medications:  Scheduled PRN   albuterol, 2.5 mg, Q6H  apixaban, 5 mg, BID  chlorhexidine, 15 mL, Q12H ISAEL  cholecalciferol, 1,000 Units, Daily  FLUoxetine, 10 mg, Daily  omeprazole (PRILOSEC) suspension 2 mg/mL, 20 mg, Daily  thiamine, 100 mg, Daily      acetaminophen, 650 mg, Q4H PRN  polyethylene glycol, 17 g, Daily PRN  senna, 17.6 mg, Daily PRN       Continuous          Labs:    CBC    No recent results  BMP    No recent results    Coags    No recent results     Additional Electrolytes  No recent results       Blood Gas    Recent Labs     09/16/23  1349   PHART 7.443   [id number] 47.8*   PO2ART 85.7   [id number] 32.0*   BEART 6.8   SOURCE Radial, Right     Recent Labs     09/16/23  1349   SOURCE Radial, Right    LFTs  No recent results    Infectious  No recent results  Glucose  No recent results              [address] Jansen, CRNP

## 2023-09-16 NOTE — UTILIZATION REVIEW
Continued Stay Review    Date:  9/16/23                         Current Patient Class: inpatient    Current Level of Care: ICU    HPI:38 y.o. male initially admitted on  8/24/23    Assessment/Plan:   HPI/24hr events: Patient remains on trach collar with 30% fi02. He underwent EMG which did not reveal any acute denervation. He remains with unclear etiology; however, has improved since receiving IVIG and high dose steroids. Vital Signs:   09/16/23 1200 98.8 °F (37.1 °C) 92 20 115/77 92 98 % 30 -- T-Piece Sitting   09/16/23 1100 -- 85 36 Abnormal  115/75 88 98 % -- -- -- --   09/16/23 1000 -- 86 26 Abnormal  119/77 93 97 % -- -- -- Sitting   09/16/23 0900 -- 87 36 Abnormal  99/65 77 94 % -- -- -- --   09/16/23 0800 -- 91 36 Abnormal  98/71 81 96 % 30 -- Trach mask Lying   09/16/23 0738 -- -- -- -- -- 96 % 30 -- Trach mask      Pertinent Labs/Diagnostic Results:           ICU  Trach suction humdification package  Trach collar oxygen   High frequency chest wall oscilation  Medications:   Scheduled Medications:  albuterol, 2.5 mg, Nebulization, Q6H  apixaban, 5 mg, Oral, BID  chlorhexidine, 15 mL, Mouth/Throat, Q12H ISAEL  cholecalciferol, 1,000 Units, Oral, Daily  FLUoxetine, 10 mg, Oral, Daily  omeprazole (PRILOSEC) suspension 2 mg/mL, 20 mg, Oral, Daily  thiamine, 100 mg, Oral, Daily      Continuous IV Infusions:     PRN Meds:  acetaminophen, 650 mg, Oral, Q4H PRN  polyethylene glycol, 17 g, Oral, Daily PRN  senna, 17.6 mg, Oral, Daily PRN        Discharge Plan: d    Network Utilization Review Department  ATTENTION: Please call with any questions or concerns to [phone] and carefully listen to the prompts so that you are directed to the right person. All voicemails are confidential.  Dhara Mai all requests for admission clinical reviews, approved or denied determinations and any other requests to dedicated fax number below belonging to the campus where the patient is receiving treatment.  List of dedicated fax numbers for the Facilities:  FACILITY NAME UR FAX NUMBER   ADMISSION DENIALS (Administrative/Medical Necessity) [phone] [address] (Maternity/NICU/Pediatrics) [phone]   [address] [place] [address] [address] [place] [address] 1000 [facility] [phone] 150[address] [facility] 207 [facility] [address] [place] [address] [id number] [place] [address] [phone]   [address] [address] [phone]

## 2023-09-16 NOTE — PLAN OF CARE
Problem: Prexisting or High Potential for Compromised Skin Integrity  Goal: Skin integrity is maintained or improved  Description: INTERVENTIONS:  - Identify patients at risk for skin breakdown  - Assess and monitor skin integrity  - Assess and monitor nutrition and hydration status  - Monitor labs   - Assess for incontinence   - Turn and reposition patient  - Assist with mobility/ambulation  - Relieve pressure over bony prominences  - Avoid friction and shearing  - Provide appropriate hygiene as needed including keeping skin clean and dry  - Evaluate need for skin moisturizer/barrier cream  - Collaborate with interdisciplinary team   - Patient/family teaching  - Consider wound care consult   Outcome: Progressing     Problem: MOBILITY - ADULT  Goal: Maintain or return to baseline ADL function  Description: INTERVENTIONS:  -  Assess patient's ability to carry out ADLs; assess patient's baseline for ADL function and identify physical deficits which impact ability to perform ADLs (bathing, care of mouth/teeth, toileting, grooming, dressing, etc.)  - Assess/evaluate cause of self-care deficits   - Assess range of motion  - Assess patient's mobility; develop plan if impaired  - Assess patient's need for assistive devices and provide as appropriate  - Encourage maximum independence but intervene and supervise when necessary  - Involve family in performance of ADLs  - Assess for home care needs following discharge   - Consider OT consult to assist with ADL evaluation and planning for discharge  - Provide patient education as appropriate  Outcome: Progressing  Goal: Maintains/Returns to pre admission functional level  Description: INTERVENTIONS:  - Perform BMAT or MOVE assessment daily.   - Set and communicate daily mobility goal to care team and patient/family/caregiver. - Collaborate with rehabilitation services on mobility goals if consulted  - Perform Range of Motion 4 times a day.   - Reposition patient every 2 hours.  -- Record patient progress and toleration of activity level   Outcome: Progressing     Problem: CARDIOVASCULAR - ADULT  Goal: Maintains optimal cardiac output and hemodynamic stability  Description: INTERVENTIONS:  - Monitor I/O, vital signs and rhythm  - Monitor for S/S and trends of decreased cardiac output  - Administer and titrate ordered vasoactive medications to optimize hemodynamic stability  - Assess quality of pulses, skin color and temperature  - Assess for signs of decreased coronary artery perfusion  - Instruct patient to report change in severity of symptoms  Outcome: Progressing  Goal: Absence of cardiac dysrhythmias or at baseline rhythm  Description: INTERVENTIONS:  - Continuous cardiac monitoring, vital signs, obtain 12 lead EKG if ordered  - Administer antiarrhythmic and heart rate control medications as ordered  - Monitor electrolytes and administer replacement therapy as ordered  Outcome: Progressing     Problem: GENITOURINARY - ADULT  Goal: Maintains or returns to baseline urinary function  Description: INTERVENTIONS:  - Assess urinary function  - Encourage oral fluids to ensure adequate hydration if ordered  - Administer IV fluids as ordered to ensure adequate hydration  - Administer ordered medications as needed  - Offer frequent toileting  - Follow urinary retention protocol if ordered  Outcome: Progressing  Goal: Absence of urinary retention  Description: INTERVENTIONS:  - Assess patient’s ability to void and empty bladder  - Monitor I/O  - Bladder scan as needed  - Discuss with physician/AP medications to alleviate retention as needed  - Discuss catheterization for long term situations as appropriate  Outcome: Progressing     Problem: METABOLIC, FLUID AND ELECTROLYTES - ADULT  Goal: Electrolytes maintained within normal limits  Description: INTERVENTIONS:  - Monitor labs and assess patient for signs and symptoms of electrolyte imbalances  - Administer electrolyte replacement as ordered  - Monitor response to electrolyte replacements, including repeat lab results as appropriate  - Instruct patient on fluid and nutrition as appropriate  Outcome: Progressing  Goal: Fluid balance maintained  Description: INTERVENTIONS:  - Monitor labs   - Monitor I/O and WT  - Instruct patient on fluid and nutrition as appropriate  - Assess for signs & symptoms of volume excess or deficit  Outcome: Progressing     Problem: SKIN/TISSUE INTEGRITY - ADULT  Goal: Skin Integrity remains intact(Skin Breakdown Prevention)  Description: Assess:  -Perform Adria assessment every shift   -Clean and moisturize skin every 2 hours and PRN   -Inspect skin when repositioning, toileting, and assisting with ADLS  -Assess under medical devices such as cardiac monitor wires IV tubing every 2 hours and PRN   -Assess extremities for adequate circulation and sensation     Bed Management:  -Have minimal linens on bed & keep smooth, unwrinkled  -Change linens as needed when moist or perspiring  -Avoid sitting or lying in one position for more than 2 hours while in bed  -Keep HOB at 30degrees     Toileting:  -Offer bedside commode  -Assess for incontinence every 2 hours  -Use incontinent care products after each incontinent episode such as pads    Activity:  -Mobilize patient 3 times a day  -Encourage or provide ROM exercises   -Turn and reposition patient every 2 Hours  -Use appropriate equipment to lift or move patient in bed  -Instruct/ Assist with weight shifting every hour when out of bed in chair      Skin Care:  -Avoid use of baby powder, tape, friction and shearing, hot water or constrictive clothing  -Relieve pressure over bony prominences using foam dressing  -Do not massage red bony areas    Next Steps:  -Consider consults to  interdisciplinary teams such as PT/OT  Outcome: Progressing  Goal: Pressure injury heals and does not worsen  Description: Interventions:  - Implement low air loss mattress or specialty surface (Criteria met)  - Apply silicone foam dressing  - Instruct/assist with weight shifting every 60  minutes when in chair   - Limit chair time to 2 hour intervals  - Use special pressure reducing interventions such as EHOB when in chair   - Apply fecal or urinary incontinence containment device   - Perform passive or active ROM every 2 hours   - Turn and reposition patient & offload bony prominences every 2 hours   - Utilize friction reducing device or surface for transfers   - Consider consults to  interdisciplinary teams such as PT/OT  - Use incontinent care products after each incontinent episode such as pads   - Consider nutrition services referral as needed  Outcome: Progressing     Problem: HEMATOLOGIC - ADULT  Goal: Maintains hematologic stability  Description: INTERVENTIONS  - Assess for signs and symptoms of bleeding or hemorrhage  - Monitor labs  - Administer supportive blood products/factors as ordered and appropriate  Outcome: Progressing     Problem: MUSCULOSKELETAL - ADULT  Goal: Maintain or return mobility to safest level of function  Description: INTERVENTIONS:  - Assess patient's ability to carry out ADLs; assess patient's baseline for ADL function and identify physical deficits which impact ability to perform ADLs (bathing, care of mouth/teeth, toileting, grooming, dressing, etc.)  - Assess/evaluate cause of self-care deficits   - Assess range of motion  - Assess patient's mobility  - Assess patient's need for assistive devices and provide as appropriate  - Encourage maximum independence but intervene and supervise when necessary  - Involve family in performance of ADLs  - Assess for home care needs following discharge   - Consider OT consult to assist with ADL evaluation and planning for discharge  - Provide patient education as appropriate  Outcome: Progressing  Goal: Maintain proper alignment of affected body part  Description: INTERVENTIONS:  - Support, maintain and protect limb and body alignment  - Provide patient/ family with appropriate education  Outcome: Progressing     Problem: PAIN - ADULT  Goal: Verbalizes/displays adequate comfort level or baseline comfort level  Description: Interventions:  - Encourage patient to monitor pain and request assistance  - Assess pain using appropriate pain scale  - Administer analgesics based on type and severity of pain and evaluate response  - Implement non-pharmacological measures as appropriate and evaluate response  - Consider cultural and social influences on pain and pain management  - Notify physician/advanced practitioner if interventions unsuccessful or patient reports new pain  Outcome: Progressing     Problem: INFECTION - ADULT  Goal: Absence or prevention of progression during hospitalization  Description: INTERVENTIONS:  - Assess and monitor for signs and symptoms of infection  - Monitor lab/diagnostic results  - Monitor all insertion sites, i.e. indwelling lines, tubes, and drains  - Monitor endotracheal if appropriate and nasal secretions for changes in amount and color  - [place] appropriate cooling/warming therapies per order  - Administer medications as ordered  - Instruct and encourage patient and family to use good hand hygiene technique  - Identify and instruct in appropriate isolation precautions for identified infection/condition  Outcome: Progressing     Problem: SAFETY ADULT  Goal: Maintain or return to baseline ADL function  Description: INTERVENTIONS:  -  Assess patient's ability to carry out ADLs; assess patient's baseline for ADL function and identify physical deficits which impact ability to perform ADLs (bathing, care of mouth/teeth, toileting, grooming, dressing, etc.)  - Assess/evaluate cause of self-care deficits   - Assess range of motion  - Assess patient's mobility; develop plan if impaired  - Assess patient's need for assistive devices and provide as appropriate  - Encourage maximum independence but intervene and supervise when necessary  - Involve family in performance of ADLs  - Assess for home care needs following discharge   - Consider OT consult to assist with ADL evaluation and planning for discharge  - Provide patient education as appropriate  Outcome: Progressing  Goal: Maintains/Returns to pre admission functional level  Description: INTERVENTIONS:  - Perform BMAT or MOVE assessment daily.   - Set and communicate daily mobility goal to care team and patient/family/caregiver. - Collaborate with rehabilitation services on mobility goals if consulted  - Perform Range of Motion 4 times a day. - Reposition patient every 2 hours. -- Record patient progress and toleration of activity level   Outcome: Progressing  Goal: Patient will remain free of falls  Description: INTERVENTIONS:  - Educate patient/family on patient safety including physical limitations  - Instruct patient to call for assistance with activity   - Consult OT/PT to assist with strengthening/mobility   - Keep Call bell within reach  - Keep bed low and locked with side rails adjusted as appropriate  - Keep care items and personal belongings within reach  - Initiate and maintain comfort rounds  - Make Fall Risk Sign visible to staff  - Offer Toileting every 2  Hours, in advance of need  - Initiate/Maintain bed and chair alarm  - Apply yellow socks and bracelet for high fall risk patients  - Consider moving patient to room near nurses station  Outcome: Progressing     Problem: Knowledge Deficit  Goal: Patient/family/caregiver demonstrates understanding of disease process, treatment plan, medications, and discharge instructions  Description: Complete learning assessment and assess knowledge base.   Interventions:  - Provide teaching at level of understanding  - Provide teaching via preferred learning methods  Outcome: Progressing     Problem: RESPIRATORY - ADULT  Goal: Achieves optimal ventilation and oxygenation  Description: INTERVENTIONS:  - Assess for changes in respiratory status  - Assess for changes in mentation and behavior  - Position to facilitate oxygenation and minimize respiratory effort  - Oxygen administered by appropriate delivery if ordered  - Encourage broncho-pulmonary hygiene including cough, deep breathe, I  - Assess the need for suctioning and aspirate as needed  - Assess and instruct to report SOB or any respiratory difficulty  - Respiratory Therapy support as indicated  Outcome: Progressing     Problem: Nutrition/Hydration-ADULT  Goal: Nutrient/Hydration intake appropriate for improving, restoring or maintaining nutritional needs  Description: Monitor and assess patient's nutrition/hydration status for malnutrition. Collaborate with interdisciplinary team and initiate plan and interventions as ordered. Monitor patient's weight and dietary intake as ordered or per policy. Utilize nutrition screening tool and intervene as necessary. Determine patient's food preferences and provide high-protein, high-caloric foods as appropriate.      INTERVENTIONS:  - Monitor urinary output, labs, and treatment plans  - Assess nutrition and hydration status and recommend course of action  - Recommend, monitor, and adjust tube feedings based on assessed needs  - Assess need for intravenous fluids  - Provide specific nutrition/hydration education as appropriate  - Include patient/family/caregiver in decisions related to nutrition  Outcome: Progressing

## 2023-09-16 NOTE — ASSESSMENT & PLAN NOTE
· Testicular cancer s/p left orchiectomy on 12/2022  · Repeat CT scan in January 2023 with enlarging lymph node.   · Began Chemo in March 2023: Etoposide and Cisplatin with plan for 4 cycles, 5 days every 21 days  · Did not complete chemo treatment due to adverse effects  · After 3rd cycle reported double vision, labile affect and right sided weakness and therefore chemotherapy was stopped on 5/26/23  · Continue outpatient follow up with Heme/Onc

## 2023-09-17 LAB
ANION GAP SERPL CALCULATED.3IONS-SCNC: 4 MMOL/L
BASOPHILS # BLD AUTO: 0.02 THOUSANDS/ÂΜL (ref 0–0.1)
BASOPHILS NFR BLD AUTO: 0 % (ref 0–1)
BUN SERPL-MCNC: 17 MG/DL (ref 5–25)
CALCIUM SERPL-MCNC: 8.7 MG/DL (ref 8.4–10.2)
CHLORIDE SERPL-SCNC: 101 MMOL/L (ref 96–108)
CO2 SERPL-SCNC: 33 MMOL/L (ref 21–32)
CREAT SERPL-MCNC: 0.66 MG/DL (ref 0.6–1.3)
EOSINOPHIL # BLD AUTO: 0.11 THOUSAND/ÂΜL (ref 0–0.61)
EOSINOPHIL NFR BLD AUTO: 1 % (ref 0–6)
ERYTHROCYTE [DISTWIDTH] IN BLOOD BY AUTOMATED COUNT: 15.2 % (ref 11.6–15.1)
GFR SERPL CREATININE-BSD FRML MDRD: 124 ML/MIN/1.73SQ M
GLUCOSE SERPL-MCNC: 124 MG/DL (ref 65–140)
HCT VFR BLD AUTO: 35 % (ref 36.5–49.3)
HGB BLD-MCNC: 11.4 G/DL (ref 12–17)
IMM GRANULOCYTES # BLD AUTO: 0.1 THOUSAND/UL (ref 0–0.2)
IMM GRANULOCYTES NFR BLD AUTO: 1 % (ref 0–2)
LYMPHOCYTES # BLD AUTO: 1.59 THOUSANDS/ÂΜL (ref 0.6–4.47)
LYMPHOCYTES NFR BLD AUTO: 20 % (ref 14–44)
MAGNESIUM SERPL-MCNC: 2.1 MG/DL (ref 1.9–2.7)
MCH RBC QN AUTO: 31.6 PG (ref 26.8–34.3)
MCHC RBC AUTO-ENTMCNC: 32.6 G/DL (ref 31.4–37.4)
MCV RBC AUTO: 97 FL (ref 82–98)
MONOCYTES # BLD AUTO: 0.76 THOUSAND/ÂΜL (ref 0.17–1.22)
MONOCYTES NFR BLD AUTO: 9 % (ref 4–12)
NEUTROPHILS # BLD AUTO: 5.47 THOUSANDS/ÂΜL (ref 1.85–7.62)
NEUTS SEG NFR BLD AUTO: 69 % (ref 43–75)
NRBC BLD AUTO-RTO: 0 /100 WBCS
PLATELET # BLD AUTO: 213 THOUSANDS/UL (ref 149–390)
PMV BLD AUTO: 10.2 FL (ref 8.9–12.7)
POTASSIUM SERPL-SCNC: 3.8 MMOL/L (ref 3.5–5.3)
RBC # BLD AUTO: 3.61 MILLION/UL (ref 3.88–5.62)
SODIUM SERPL-SCNC: 138 MMOL/L (ref 135–147)
WBC # BLD AUTO: 8.05 THOUSAND/UL (ref 4.31–10.16)

## 2023-09-17 PROCEDURE — 94669 MECHANICAL CHEST WALL OSCILL: CPT

## 2023-09-17 PROCEDURE — 80048 BASIC METABOLIC PNL TOTAL CA: CPT

## 2023-09-17 PROCEDURE — NC001 PR NO CHARGE: Performed by: STUDENT IN AN ORGANIZED HEALTH CARE EDUCATION/TRAINING PROGRAM

## 2023-09-17 PROCEDURE — 83735 ASSAY OF MAGNESIUM: CPT

## 2023-09-17 PROCEDURE — 85025 COMPLETE CBC W/AUTO DIFF WBC: CPT | Performed by: NURSE PRACTITIONER

## 2023-09-17 PROCEDURE — 99232 SBSQ HOSP IP/OBS MODERATE 35: CPT | Performed by: STUDENT IN AN ORGANIZED HEALTH CARE EDUCATION/TRAINING PROGRAM

## 2023-09-17 PROCEDURE — 94640 AIRWAY INHALATION TREATMENT: CPT

## 2023-09-17 PROCEDURE — 94668 MNPJ CHEST WALL SBSQ: CPT

## 2023-09-17 PROCEDURE — 94760 N-INVAS EAR/PLS OXIMETRY 1: CPT

## 2023-09-17 RX ORDER — POTASSIUM CHLORIDE 20MEQ/15ML
20 LIQUID (ML) ORAL ONCE
Status: COMPLETED | OUTPATIENT
Start: 2023-09-17 | End: 2023-09-17

## 2023-09-17 RX ORDER — MAGNESIUM SULFATE HEPTAHYDRATE 40 MG/ML
2 INJECTION, SOLUTION INTRAVENOUS ONCE
Status: COMPLETED | OUTPATIENT
Start: 2023-09-17 | End: 2023-09-17

## 2023-09-17 RX ADMIN — ALBUTEROL SULFATE 2.5 MG: 2.5 SOLUTION RESPIRATORY (INHALATION) at 19:51

## 2023-09-17 RX ADMIN — APIXABAN 5 MG: 5 TABLET, FILM COATED ORAL at 08:31

## 2023-09-17 RX ADMIN — FLUOXETINE 10 MG: 10 CAPSULE ORAL at 08:31

## 2023-09-17 RX ADMIN — ALBUTEROL SULFATE 2.5 MG: 2.5 SOLUTION RESPIRATORY (INHALATION) at 13:39

## 2023-09-17 RX ADMIN — MAGNESIUM SULFATE HEPTAHYDRATE 2 G: 40 INJECTION, SOLUTION INTRAVENOUS at 08:37

## 2023-09-17 RX ADMIN — ACETAMINOPHEN 650 MG: 650 SUSPENSION ORAL at 15:42

## 2023-09-17 RX ADMIN — Medication 1000 UNITS: at 08:31

## 2023-09-17 RX ADMIN — Medication 20 MG: at 08:36

## 2023-09-17 RX ADMIN — POTASSIUM CHLORIDE 20 MEQ: 1.5 SOLUTION ORAL at 08:37

## 2023-09-17 RX ADMIN — ACETAMINOPHEN 650 MG: 650 SUSPENSION ORAL at 20:00

## 2023-09-17 RX ADMIN — CHLORHEXIDINE GLUCONATE 15 ML: 1.2 RINSE ORAL at 20:03

## 2023-09-17 RX ADMIN — ALBUTEROL SULFATE 2.5 MG: 2.5 SOLUTION RESPIRATORY (INHALATION) at 01:26

## 2023-09-17 RX ADMIN — APIXABAN 5 MG: 5 TABLET, FILM COATED ORAL at 17:25

## 2023-09-17 RX ADMIN — ALBUTEROL SULFATE 2.5 MG: 2.5 SOLUTION RESPIRATORY (INHALATION) at 07:09

## 2023-09-17 RX ADMIN — THIAMINE HCL TAB 100 MG 100 MG: 100 TAB at 08:31

## 2023-09-17 RX ADMIN — CHLORHEXIDINE GLUCONATE 15 ML: 1.2 RINSE ORAL at 08:32

## 2023-09-17 NOTE — PROGRESS NOTES
Critical Care Attending Note; Shila Ibanez   Note Date: 23  Note Time: 8:15 AM    Patient: Miguel Sherman  Age, : 41 y.o., 1985 MRN: [id number] Code Status: Level 1 - Full Code Patient Location: ICU /ICU    Hospital LOS:24 days  ]   Patient seen and examined, medical record reviewed, discussed with house staff and nursing staff. HPI   CC: AMS  38M with a PMH of Met L Testicular Seminoma(s/p orchiectomy/chemotherapy), HFpEF, HTN, Obesity, OHS, remote meth/EtOH abuse and depression and recent admission  for AMS(Concerning for Wernickes vs Narcolepsy) who represents with AMS, weakness,  hypoxia reqruiring intubation    Key Recent Events   : Admitted, Intubated  : Trach/PEG, Pulse dose steroids, IVIG     Main ICU Plans:       #Neuro  Weakness  - Unclear Etiology - Negative workup so far(- Ach, Normal CSF protein, paraneoplastic panel) - Showing signs of improvement NIF now -[id number]   - Completed Pulse dose steroid/IVIG   - Neurology Consulted - Appreciate recs  - Follow up rheumatic workup, CSF  - PT/OT   - Continue thiamine    #Card  Pulmonary Embolism   - Eliquis    #Pulm  Acute Respiratory Failure -  Tolerating TCT - Patient will need a Astral Venitlator, qualifies due to pCO2 >45mmHg, He will also need follow up with Dr. Mabry Or in 2 weeks post discharge for management of trach. As well as the ability for suction, chest PT and nebulizers.    - Wean FiO2 - Maintain O2 Sat >90%  - Trach Care/Vap Bundle  - Pulm toilet regimen - Albuterol, Chest PT       #DVT/GI ppx  - Apixaban    #Lines/Tubes/Drains:   Peripheral IV 23 Right Antecubital (Active)   Number of days: 2       #Nutrition:   Diet Enteral/Parenteral; Tube Feeding No Oral Diet; Jevity 1.5; Continuous; 70; 200; Every 4 hours        #Code Status:   Level 1 - Full Code    #Dispo:   Plan for home DC next week        Shila Ibanez MD  Pulmonary, Critical Care    Critical care time, excluding procedures, teaching, family meetings, and excludes any prior time recorded by the AP/resident, 35 minutes. Upon my evaluation, this patient has a high probability of imminent or life-threatening deterioration due to above problems which required my direct attention, intervention, and personal management. Impression/Active Problems:    Testicular Seminoma   HFpEF   HTN   Obesity   OHS   Wernickes encephalopathy   Myopathy   Chronic Respiratory Failure   Increased Secretions    Physical Exam:     Vital Signs:   Weight: 131 kg (288 lb 12.8 oz)  IBW: Ideal body weight: 86.8 kg (191 lb 5.7 oz)  Adjusted ideal body weight: 104 kg (230 lb 5.4 oz)  Temp:  [98.8 °F (37.1 °C)-99.9 °F (37.7 °C)] 99 °F (37.2 °C)  HR:  [76-94] 79  Resp:  [20-42] 22  BP: ()/(65-82) 110/65  FiO2 (%):  [30] 30  General: NAD  Neuro: Alert, follows commands, 3-4/5 UE weakness improved  Heart: RRR  Lungs: Basilar crackles  Abdomen: Soft NT  Extremities: No Edema                Ventilator Settings:    FiO2 (%):  [30] 30          Invalid input(s): "PCO2", "O2"  Radiologic Images Reviewed:    MRI Brain  No acute intracranial pathology. Unremarkable MRI of the orbits. Improving enhancement of the hypothalamus. 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 CXR  Improved aeration of the bilateral lungs with mild bibasilar atelectasis.      Input / Output:       Intake/Output Summary (Last 24 hours) at 9/17/2023 0815  Last data filed at 9/16/2023 1801  Gross per 24 hour   Intake 1380 ml   Output 0 ml   Net 1380 ml            Infusions:     Scheduled Medications:  Current Facility-Administered Medications   Medication Dose Route Frequency Provider Last Rate   • acetaminophen  650 mg Oral Q4H PRN AMBER Barrera     • albuterol  2.5 mg Nebulization Q6H AMBER Barrera     • apixaban  5 mg Oral BID AMBER Cruz     • chlorhexidine  15 mL Mouth/Throat Q12H [facility]  AMBER Carlos     • cholecalciferol  1,000 Units Oral Daily Cielo Yusuf MD     • FLUoxetine  10 mg Oral Daily AMBER Mccarty     • omeprazole (PRILOSEC) suspension 2 mg/mL  20 mg Oral Daily  AMBER Carlos     • polyethylene glycol  17 g Oral Daily PRN  AMBER Carlos     • senna  17.6 mg Oral Daily PRN  AMBER Carlos     • thiamine  100 mg Oral Daily  AMBER Carlos         PRN Medications:  •  acetaminophen  •  polyethylene glycol  •  senna    Labs Reviewed:  Results from last 7 days   Lab Units 09/17/23  0640   WBC Thousand/uL 8.05   HEMOGLOBIN g/dL 11.4*   HEMATOCRIT % 35.0*   PLATELETS Thousands/uL 213      Results from last 7 days   Lab Units 09/17/23  0640 09/14/23  0500 09/13/23  0535 09/12/23  0529 09/11/23  0532   SODIUM mmol/L 138 140 142 145 144   CO2 mmol/L 33* 32 29 28 27   BUN mg/dL 17 16 18 22 24   CALCIUM mg/dL 8.7 8.8 8.4 8.7 9.0   MAGNESIUM mg/dL 2.1 2.2  --  2.1 2.4   PHOSPHORUS mg/dL  --  3.4  --   --  2.7         Invalid input(s): "ASTSGOT", "ALTSGPT"LABRCNTIP@ ,alkphos:3,tbilirubin:3,dbilirubin:3)@      Invalid input(s): "TROPT", "CPK", "PBNP"             I have personally seen and examined the patient on (09/17/23 between 0959-2341). I discussed the patient with the AP/resident including, but not limited to, verifying findings; reviewing labs and x-rays; discussing with consultants; developing the plan of care with the bedside nurse; and discussing treatment plan with patient or surrogate. I have reviewed the note and assessment performed by the AP/resident and agree with the AP/resident’s documented findings and plan of care with the above additions/exceptions. Please see my comments for details and adjustments.

## 2023-09-17 NOTE — RESPIRATORY THERAPY NOTE
Patient found on 30% TC tolerating well, VSS. Patient generated a NIF of -30. Will maintain support and monitor.

## 2023-09-18 LAB
DME PARACHUTE DELIVERY DATE REQUESTED: NORMAL
DME PARACHUTE ITEM DESCRIPTION: NORMAL
DME PARACHUTE ORDER STATUS: NORMAL
DME PARACHUTE SUPPLIER NAME: NORMAL
DME PARACHUTE SUPPLIER PHONE: NORMAL

## 2023-09-18 PROCEDURE — 94761 N-INVAS EAR/PLS OXIMETRY MLT: CPT

## 2023-09-18 PROCEDURE — 94760 N-INVAS EAR/PLS OXIMETRY 1: CPT

## 2023-09-18 PROCEDURE — 99232 SBSQ HOSP IP/OBS MODERATE 35: CPT | Performed by: ANESTHESIOLOGY

## 2023-09-18 PROCEDURE — 94668 MNPJ CHEST WALL SBSQ: CPT

## 2023-09-18 PROCEDURE — 94640 AIRWAY INHALATION TREATMENT: CPT

## 2023-09-18 PROCEDURE — 94669 MECHANICAL CHEST WALL OSCILL: CPT

## 2023-09-18 PROCEDURE — 97110 THERAPEUTIC EXERCISES: CPT

## 2023-09-18 PROCEDURE — 97535 SELF CARE MNGMENT TRAINING: CPT

## 2023-09-18 RX ADMIN — ALBUTEROL SULFATE 2.5 MG: 2.5 SOLUTION RESPIRATORY (INHALATION) at 13:00

## 2023-09-18 RX ADMIN — ALBUTEROL SULFATE 2.5 MG: 2.5 SOLUTION RESPIRATORY (INHALATION) at 00:56

## 2023-09-18 RX ADMIN — APIXABAN 5 MG: 5 TABLET, FILM COATED ORAL at 18:20

## 2023-09-18 RX ADMIN — CHLORHEXIDINE GLUCONATE 15 ML: 1.2 RINSE ORAL at 21:05

## 2023-09-18 RX ADMIN — THIAMINE HCL TAB 100 MG 100 MG: 100 TAB at 09:50

## 2023-09-18 RX ADMIN — APIXABAN 5 MG: 5 TABLET, FILM COATED ORAL at 09:50

## 2023-09-18 RX ADMIN — CHLORHEXIDINE GLUCONATE 15 ML: 1.2 RINSE ORAL at 09:50

## 2023-09-18 RX ADMIN — ALBUTEROL SULFATE 2.5 MG: 2.5 SOLUTION RESPIRATORY (INHALATION) at 19:58

## 2023-09-18 RX ADMIN — Medication 20 MG: at 09:52

## 2023-09-18 RX ADMIN — FLUOXETINE 10 MG: 10 CAPSULE ORAL at 09:52

## 2023-09-18 RX ADMIN — Medication 1000 UNITS: at 09:50

## 2023-09-18 RX ADMIN — ALBUTEROL SULFATE 2.5 MG: 2.5 SOLUTION RESPIRATORY (INHALATION) at 07:11

## 2023-09-18 NOTE — ASSESSMENT & PLAN NOTE
Previous echocardiogram 6/23 showed EF 93%, normal diastolic dysfunction, mild left ventricular hypertrophy, mild RVSP elevation and mild TR  · PTA Med: Torsemide 20 mg daily  · Evaluated by Heart Failure team in July 2023.  LVH thought to be secondary to hypertension, obesity, hx of meth use  · Echo this admission shows normal EF of 60%, normal LV thickness, no diastolic dysfunction  · Remains off torsemide

## 2023-09-18 NOTE — CASE MANAGEMENT
Case Management Discharge Planning Note    Patient name Rosita Rodriguez  Location ICU 01/ICU 01 MRN [id number]  : 1985 Date 2023       Current Admission Date: 2023  Current Admission Diagnosis:Acute respiratory failure with hypoxia [facility])   Patient Active Problem List    Diagnosis Date Noted   • Motor neuron disease [facility])    • Status post tracheostomy ([address]) 2023   • S/P percutaneous endoscopic gastrostomy (PEG) tube placement ([address]) 2023   • Anxiety 2023   • Acute respiratory failure with hypoxia ([address]) 2023   • Acute pulmonary embolism without acute cor pulmonale ([address]) 2023   • History of Wernicke's encephalopathy 2023   • Depression 2023   • Neuromuscular weakness ([address]) 2023   • History of LVH (left ventricular hypertrophy) 2023   • Pure hypertriglyceridemia 2023   • Encephalopathy 2023   • Unilateral vestibular schwannoma ([address]) 2023   • Port-A-Cath in place 2023   • Diplopia 2023   • 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   • Urinary hesitancy    • Umbilical hernia without obstruction and without gangrene 12/10/2022   • Tobacco use 12/10/2022   • Retroperitoneal lymphadenopathy 12/10/2022   • Hypertension 12/10/2022      LOS (days): 25  Geometric Mean LOS (GMLOS) (days):   Days to GMLOS:     OBJECTIVE:  Risk of Unplanned Readmission Score: 31.52     Current admission status: Inpatient   Preferred Pharmacy:   [address] [place], [place] [address]scar,[address], [address] [facility] [address]  Phone: [phone] Fax: [phone]    Primary Care Provider: Jazlyn Arreola MD    Primary Insurance: [address]  Secondary Insurance:     DISCHARGE DETAILS:    Discharge planning discussed with[de-identified] sister Sadler  [place] of Choice: Yes  Comments - [place] of Choice: SW following to assist with DCP. PT's sister and aunt were visiting today.   SW met with family to review plans. Sister said they have started receiving medical equipment for pt at home. SW discussed plan to order home ventilator as recommended by ICU team.  Per Rosita Padron at [address] [place] [address] they will want to do a home inspection and family will need to be trained. SAHIL informed sister that [facility] would be contacting her to schedule visit. Sister verbalized understanding. SW offered ongoing support and assistance with planning. SW will follow. CM contacted family/caregiver?: Yes    Contacts  Patient Contacts: Dmitry Swanson  Relationship to Patient[de-identified] Family (sister)  Contact Method: In Person  Reason/Outcome: Discharge Planning, Continuity of Care    DME Referral Provided  Referral made for DME?: Yes  DME referral completed for the following items[de-identified] Other (Tube feed, trach/suction supplies, ventilator, Nica Handing)  DME Supplier Name[de-identified] [employer]    Other Referral/Resources/Interventions Provided:  Interventions: DME, C  Referral Comments: Notified [address] of plan to transition home by end of week pending delivery of all DME. SAHIL continues to work with [facility] and Rosita Padron to arrange all necessary home DME. Ventilator and trach supply prescriptions signed by Dr. Basia Cottrell and sent to [facility].     Treatment Team Recommendation: Short Term Rehab  Discharge Destination Plan[de-identified] Home with [address] [facility] at Discharge : [place]

## 2023-09-18 NOTE — PROGRESS NOTES
[address] [facility]  Progress Note  Name: Hosea Pena  MRN: [id number]  Unit/Bed#: ICU 01 I Date of Admission: 8/24/2023   Date of Service: 9/18/2023 I Hospital Day: 25    Assessment/Plan   * Acute respiratory failure with hypoxia [facility])  Assessment & Plan  · 8/12-8/13: intubated for airway protection when acutely had GCS of 5 at [facility]  · 8/23 CTA PE study: PE in the right lower lobe segmental pulmonary artery  · 8/24 RRT > intubated for hypoxia + airway protection   · 8/25: Bronchoscopy: poorly tolerated secondary to hypoxia. Some mucus plugging present on the left. · 8/26: CT chest: Complete right lower lobe and near complete left lower lobe atelectasis + edema  · 8/31: Completed Zosyn x 7 days   · 9/8: Gerhardt Section placed    Plan:  · Patient has been on trach collar since 9/15, tolerating 8L via Tpiece  · Patient does require Q4 suctioning, + strong cough but unable to fully clear secretions without suctioning  · Plan for D/C today to home with family. Home vent delivered   · Follow up with case management    Plan:  · Continue trach collar   · Continue pulmonary hygiene   · Daily NIF's  · VAP ppx; chlorhexidine, PPI, HOB greater than 30 degrees  · Plan for d/c home Monday     Neuromuscular weakness [facility])  Assessment & Plan  Patient initially presented to [address] [place] [address] on 8/23 with hypoxia and encephalopathy. Acute change in GCS prompted rapid response and subsequent intubation for airway protection. Of note, pt w/ recent hospitalization to [facility] 8/11-8/17 for encephalopathy with unclear etiology despite extensive workup but Wernicke's encephalopathy was on the differential.    ·  [place] workup 8/11:  · Rapid response and intubation on 8/12 for airway protection, extubated on 8/13  · CT head 8/11: No acute intracranial abnormality. Stable small bilateral subcortical hypoattenuating foci. No associated mass effect. · MRI 8/12: No acute intracranial abnormality.  Stable nonspecific enhancement along the anterior floor of the third ventricle/hypothalamus compared to March 2023 study. Findings below:  · VEEG 8/12: negative for seizure activity. Possible findings relating to underlying sleep disorder   · LP: Grossly negative overall  · DSPO: Treated with high dose thiamine. Psych evaluated, possible bipolar disorder. Discharged to home with his sister with visiting nurses, ST/PT/OT  · [facility] 8/23: Presented with hypoxia found by visiting nurse SPO2 in 80's  · 8/24: RRT > intubated for airway protection   · Transferred to [address] [place] [address] on 8/24  · [facility] 8/24 (Current hospital course)  · CT head 8/24: No acute intracranial abnormality  · 8/25 EEG: No evidence of electrographic seizures. Mild background slowing suggestive of underlying cerebral dysfunction from toxic/metablic/infectious/hypoxic encephalopathy. Clinical correlation is recommended  · Ach binding and blocking Ab negative   · MUSK Ab and ACHR modulating Ab neg  · Serum anti-TPO and DARIN Ab's, RPR, neg. CSF paraneoplastic panel neg  · Varicella PCR neg  · Toxoplasma antibodies negative  · Rheum labs negative  · 9/6 repeat LP cultures negative   · 9/7 Completed 7 days of Ceftriaxone to cover for possible neurosyphillis  · MRI 9/9: No acute intracranial pathology. Unremarkable MRI of the orbits. Improving enhancement of the hypothalamus. 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   · 9/10 completed 3 days pulse dose steroids  · 9/11 completed 5 days IVIG  · [facility] serum test pending  · 9/15 EMG revealed no acute motor neuron disease     Plan:  · F/u outpatient with neurology in 6-8 weeks ; no clear diagnosis at this time  · PT/OT, discuss with case management regarding home services    Acute pulmonary embolism without acute cor pulmonale ([address])  Assessment & Plan  · Continue Eliquis     Status post tracheostomy [facility])  Assessment & Plan  · Trach care  · Follow up trach sutures     S/P percutaneous endoscopic gastrostomy (PEG) tube placement [facility])  Assessment & Plan  · Continue tube feeds at goal     History of LVH (left ventricular hypertrophy)  Assessment & Plan  Previous echocardiogram 6/23 showed EF 09%, normal diastolic dysfunction, mild left ventricular hypertrophy, mild RVSP elevation and mild TR  · PTA Med: Torsemide 20 mg daily  · Evaluated by Heart Failure team in July 2023. LVH thought to be secondary to hypertension, obesity, hx of meth use  · Echo this admission shows normal EF of 60%, normal LV thickness, no diastolic dysfunction  · Remains off torsemide     Depression  Assessment & Plan  · Fluoxetine 10 mg daily    Seminoma of left testis [facility])  Assessment & Plan  · Testicular cancer s/p left orchiectomy on 12/2022  · Repeat CT scan in January 2023 with enlarging lymph node. · Began Chemo in March 2023: Etoposide and Cisplatin with plan for 4 cycles, 5 days every 21 days  · Did not complete chemo treatment due to adverse effects  · After 3rd cycle reported double vision, labile affect and right sided weakness and therefore chemotherapy was stopped on 5/26/23  · Continue outpatient follow up with Heme/Onc     Hypertension  Assessment & Plan  · Patient has been normotensive. Remains off anti-hypertensives     Umbilical hernia without obstruction and without gangrene  Assessment & Plan  · Umbilical hernia present, easily reducible              ICU Core Measures     Vented Patient  VAP Bundle  VAP bundle ordered     A: Assess, Prevent, and Manage Pain · Has pain been assessed? Yes  · Need for changes to pain regimen? No   B: Both Spontaneous Awakening Trials (SATs) and Spontaneous Breathing Trials (SBTs) · Plan to perform spontaneous awakening trial today? N/A   · Plan to perform spontaneous breathing trial today? N/A   · Obvious barriers to extubation? NA   C: Choice of Sedation · RASS Goal: 0 Alert and Calm  · Need for changes to sedation or analgesia regimen?  NA   D: Delirium · CAM-ICU: Negative   E: Early Mobility · Plan for early mobility? Yes   F: Family Engagement · Plan for family engagement today? Yes       Review of Invasive Devices:      Central access plan: port      Prophylaxis:  VTE VTE covered by:  apixaban, Oral, 5 mg at 09/17/23 1725       Stress Ulcer  covered byomeprazole (PRILOSEC) suspension 2 mg/mL [[id number]]       Subjective   HPI/24hr events: Tolerated trach collar since 9/15, currently on 8L via T piece. Requiring Q4 hour suctioning. Otherwise, no acute events. Review of Systems     Objective                            Vitals I/O      Most Recent Min/Max in 24hrs   Temp 99 °F (37.2 °C) Temp  Min: 98.2 °F (36.8 °C)  Max: 99 °F (37.2 °C)   Pulse 81 Pulse  Min: 72  Max: 90   Resp 21 Resp  Min: 15  Max: 45   /70 BP  Min: 94/58  Max: 125/70   O2 Sat 100 % SpO2  Min: 92 %  Max: 100 %      Intake/Output Summary (Last 24 hours) at 9/18/2023 0824  Last data filed at 9/18/2023 0410  Gross per 24 hour   Intake 120 ml   Output --   Net 120 ml         Diet Enteral/Parenteral; Tube Feeding No Oral Diet; Jevity 1.5; Continuous; 70; 200; Every 4 hours     Invasive Monitoring Physical exam    Physical Exam  Eyes:      Extraocular Movements: Extraocular movements intact. Pupils: Pupils are equal, round, and reactive to light. Skin:     General: Skin is warm and dry. Coloration: Skin is not jaundiced. Findings: No wound. HENT:      Head: Normocephalic and atraumatic. Mouth/Throat:      Mouth: Mucous membranes are dry. Cardiovascular:      Rate and Rhythm: Normal rate and regular rhythm. Musculoskeletal:      Right lower leg: Trace Edema present. Left lower leg: Trace Edema present. Abdominal:      Palpations: Abdomen is soft. Tenderness: There is no abdominal tenderness. Hernia: A hernia is present. Comments: PEG tube in place   Constitutional:       General: He is not in acute distress. Appearance: He is well-developed. He is not toxic-appearing. Interventions: He is not sedated. Pulmonary:      Effort: Pulmonary effort is normal. No accessory muscle usage, respiratory distress or accessory muscle usage. Breath sounds: Normal breath sounds. Comments: Trach collar  Psychiatric:         Speech: Speech is not no expressive aphasia. Neurological:      General: No focal deficit present. Mental Status: He is alert. Cranial Nerves: No facial asymmetry. Motor: Weakness. No tremor.       Comments: Significant generalized weakness            Diagnostic Studies           Medications:  Scheduled PRN   albuterol, 2.5 mg, Q6H  apixaban, 5 mg, BID  chlorhexidine, 15 mL, Q12H ISAEL  cholecalciferol, 1,000 Units, Daily  FLUoxetine, 10 mg, Daily  omeprazole (PRILOSEC) suspension 2 mg/mL, 20 mg, Daily  thiamine, 100 mg, Daily      acetaminophen, 650 mg, Q4H PRN  polyethylene glycol, 17 g, Daily PRN  senna, 17.6 mg, Daily PRN       Continuous          Labs:    CBC    Recent Labs     09/17/23  0640   WBC 8.05   HGB 11.4*   HCT 35.0*        BMP    Recent Labs     09/17/23  0640   SODIUM 138   K 3.8      CO2 33*   AGAP 4   BUN 17   CREATININE 0.66   CALCIUM 8.7       Coags    No recent results     Additional Electrolytes  Recent Labs     09/17/23  0640   MG 2.1          Blood Gas    Recent Labs     09/16/23  1349   PHART 7.443   [id number] 47.8*   PO2ART 85.7   MHG0VVR 32.0*   BEART 6.8   SOURCE Radial, Right     Recent Labs     09/16/23  1349   SOURCE Radial, Right    LFTs  No recent results    Infectious  No recent results  Glucose  Recent Labs     09/17/23  0640   GLUC [address] [facility]

## 2023-09-18 NOTE — UTILIZATION REVIEW
Continued Stay Review    Date: 9/17/23 & 9/18/23                          Current Patient Class: inpatient  Current Level of Care: ICU  HPI:38 y.o. male initially admitted on 9/24/23    Assessment/Plan:   9/17/23:  Tmax : 99.9  Alert, follows commands, persistent weakness. Trach collar in place, O2 8ltr, lungs with bibasilar crackles. PEG tube in place. 9/18/23:  Temp 99  Trach collar since 9/15, currently on 8L via T piece, tachypneic. Requiring Q4 hour suctioning. Remains significantly weakened. Afflovest ordered. Wound care continued L hand.        Vital Signs:   Date/Time Temp Pulse Resp BP MAP (mmHg) SpO2 FiO2 (%) Calculated FIO2 (%) - Nasal Cannula O2 Flow Rate (L/min) Nasal Cannula O2 Flow Rate (L/min) O2 Device Patient Position - Orthostatic VS   09/18/23 1000 -- 88 21 118/67 88 99 % -- -- -- -- -- --   09/18/23 0900 -- 84 22 125/74 94 99 % -- -- -- -- -- --   09/18/23 0800 98.7 °F (37.1 °C) 81 26 Abnormal  112/68 85 99 % 30 52 -- 8 L/min T-Piece Lying   09/18/23 0715 -- -- -- -- -- 100 % 30 52 8 L/min 8 L/min T-Piece --   09/18/23 0600 -- 81 21 109/70 85 99 % -- -- -- -- -- --   09/18/23 0514 -- -- -- -- -- 95 % 30 52 8 L/min 8 L/min T-Piece --   09/18/23 0500 -- 85 19 116/80 93 97 % -- -- -- -- -- --   09/18/23 0400 99 °F (37.2 °C) 80 25 Abnormal  105/65 80 98 % -- -- -- -- -- --   09/18/23 0300 -- 73 17 125/70 92 99 % -- -- -- -- -- --   09/18/23 0200 -- 77 29 Abnormal  95/66 75 97 % -- -- -- -- -- --   09/18/23 0100 -- 83 28 Abnormal  107/64 81 99 % -- -- -- -- -- --   09/18/23 0056 -- -- -- -- -- 95 % 30 52 8 L/min 8 L/min T-Piece --   09/18/23 0000 98.7 °F (37.1 °C) 79 15 123/76 93 99 % -- -- -- -- -- --   09/17/23 2300 -- 72 26 Abnormal  99/66 78 94 % -- -- -- -- -- --   09/17/23 2200 -- 72 24 Abnormal  94/58 70 94 % -- -- -- -- -- --   09/17/23 2100 -- 81 26 Abnormal  104/55 74 97 % -- -- -- -- -- --   09/17/23 2000 98.5 °F (36.9 °C) 75 30 Abnormal  120/70 -- 100 % 30 -- -- -- T-Piece Lying 09/17/23 1955 -- -- -- -- -- 100 % 30 52 -- 8 L/min T-Piece --   09/17/23 1800 -- 75 33 Abnormal  116/88 97 93 % -- -- -- -- -- --   09/17/23 1734 -- -- -- -- -- 94 % 30 52 -- 8 L/min T-Piece --   09/17/23 1700 -- 80 25 Abnormal  100/59 72 92 % -- -- -- -- -- --   09/17/23 1600 98.8 °F (37.1 °C) 86 44 Abnormal  110/76 89 99 % -- 52 -- 8 L/min T-Piece Lying     Diet Enteral/Parenteral; Tube Feeding No Oral Diet; Jevity 1.5; Continuous; 70; 200; Every 4 hours    Pertinent Labs/Diagnostic Results:     Results from last 7 days   Lab Units 09/17/23  0640   WBC Thousand/uL 8.05   HEMOGLOBIN g/dL 11.4*   HEMATOCRIT % 35.0*   PLATELETS Thousands/uL 213   NEUTROS ABS Thousands/µL 5.47     Results from last 7 days   Lab Units 09/17/23  0640 09/14/23  0500 09/13/23  0535 09/12/23  0529   SODIUM mmol/L 138 140 142 145   POTASSIUM mmol/L 3.8 4.3 3.7 3.7   CHLORIDE mmol/L 101 105 107 112*   CO2 mmol/L 33* 32 29 28   ANION GAP mmol/L 4 3 6 5   BUN mg/dL 17 16 18 22   CREATININE mg/dL 0.66 0.57* 0.58* 0.80   EGFR ml/min/1.73sq m 124 132 131 114   CALCIUM mg/dL 8.7 8.8 8.4 8.7   MAGNESIUM mg/dL 2.1 2.2  --  2.1   PHOSPHORUS mg/dL  --  3.4  --   --      Results from last 7 days   Lab Units 09/12/23  0530 09/11/23  2331 09/11/23  1719 09/11/23  1121   POC GLUCOSE mg/dl 91 99 104 116     Results from last 7 days   Lab Units 09/17/23  0640 09/14/23  0500 09/13/23  0535 09/12/23  0529   GLUCOSE RANDOM mg/dL 124 121 99 89     Results from last 7 days   Lab Units 09/16/23  1349   PH ART  7.443   PCO2 ART mm Hg 47.8*   PO2 ART mm Hg 85.7   HCO3 ART mmol/L 32.0*   BASE EXC ART mmol/L 6.8   O2 CONTENT ART mL/dL 17.7   O2 HGB, ARTERIAL % 95.5   ABG SOURCE  Radial, Right     Medications:   Scheduled Medications:  albuterol, 2.5 mg, Nebulization, Q6H  apixaban, 5 mg, Oral, BID  chlorhexidine, 15 mL, Mouth/Throat, Q12H ISAEL  cholecalciferol, 1,000 Units, Oral, Daily  FLUoxetine, 10 mg, Oral, Daily  thiamine, 100 mg, Oral, Daily    PRN Meds:  acetaminophen, 650 mg, Oral, Q4H PRN  polyethylene glycol, 17 g, Oral, Daily PRN  senna, 17.6 mg, Oral, Daily PRN    Discharge Plan: d    Network Utilization Review Department  ATTENTION: Please call with any questions or concerns to [phone] and carefully listen to the prompts so that you are directed to the right person. All voicemails are confidential.  Abel Gomez all requests for admission clinical reviews, approved or denied determinations and any other requests to dedicated fax number below belonging to the campus where the patient is receiving treatment.  List of dedicated fax numbers for the Facilities:  [place] DENIALS (Administrative/Medical Necessity) [phone] [facility] [phone]   [address] [place] [address] [phone]   [facility] [address] [facility] [phone]   [facility] [address] [phone]   [address] [facility] [address] [phone]

## 2023-09-18 NOTE — RESPIRATORY THERAPY NOTE
Home Oxygen Qualifying Test     Patient name: Amanda Bowen        : 1985   Date of Test:  2023  Diagnosis:    Home Oxygen Test:    Medicare Guidelines require item(s) 1-5 on all ambulatory patients or 1 and 2 on non-ambulatory patients. 1. Baseline SPO2 on Room Air at rest 94 %   a. If <= 88% on Room Air add O2 via NC to obtain SpO2 >=88%. If LPM needed, document LPM  needed to reach =>88%    2. SPO2 during exertion on Room Air   86%  a. During exertion monitor SPO2. If SPO2 increases >=89%, do not add supplemental oxygen    3. SPO2 on Oxygen at Rest  % at  LPM    4. SPO2 during exertion on Oxygen 93  % at 28% trach collar running at 6 LPM     5. Test performed during exertion activity. Maximal assist to stand with physical and occupational therapy present. Also patient was assisted in doing leg kicks. [x]  Supplemental Home Oxygen is indicated. []  Client does not qualify for home oxygen. Respiratory Additional Notes- Patient was able to stand with maximal assist. Patient was also able to perform leg kicks with assistance. These were his exertional activities. He required 28% 6L on his trach collar to maintain Spo2 above required levels.    [employer], RT

## 2023-09-18 NOTE — OCCUPATIONAL THERAPY NOTE
Occupational Therapy Progress Note     Patient Name: Bin Akins  QNSWW'O Date: 9/18/2023  Problem List  Principal Problem:    Acute respiratory failure with hypoxia [facility])  Active Problems:    Umbilical hernia without obstruction and without gangrene    Hypertension    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HCC)    History of LVH (left ventricular hypertrophy)    Neuromuscular weakness (HCC)    Depression    Acute pulmonary embolism without acute cor pulmonale (HCC)    S/P percutaneous endoscopic gastrostomy (PEG) tube placement [facility])    Status post tracheostomy ([address])          09/18/23 1254   OT Last Visit   OT Visit Date 09/18/23  (Monday)   Note Type   Note Type Treatment   Pain Assessment   Pain Assessment Tool [facility] Pain Intervention(s) Repositioned; Ambulation/increased activity; Emotional support   Pain Rating: FLACC (Rest) - Face 1   Pain Rating: FLACC (Rest) - Legs 0   Pain Rating: FLACC (Rest) - Activity 0   Pain Rating: FLACC (Rest) - Cry 0   Pain Rating: FLACC (Rest) - Consolability 0   Score: FLACC (Rest) 1   Pain Rating: FLACC (Activity) - Face 1   Pain Rating: FLACC (Activity) - Legs 0   Pain Rating: FLACC (Activity) - Activity 0   Pain Rating: FLACC (Activity) - Cry 1   Pain Rating: FLACC (Activity) - Consolability 0   Score: FLACC (Activity) 2   Restrictions/Precautions   Weight Bearing Precautions Per Order No   Other Precautions Chair Alarm; Bed Alarm;Multiple lines;Telemetry; Fall Risk;Pain  (s/p trach/ PEG tube)   ADL   Where Assessed Chair   Eating Assistance Unable to assess  (s/p PEG tube)   UB Dressing Assistance 3  Moderate Assistance   UB Dressing Deficit Setup;Steadying;Pull around back; Fasteners   LB Dressing Assistance 1  Total Assistance   LB Dressing Deficit Don/doff R sock; Don/doff L sock   Bed Mobility   Supine to Sit Unable to assess   Sit to Supine Unable to assess   Additional Comments Pt seated OOB in chair upon arrival and at end of session w/ needs met, call bell in reach and chair alarm activated   Transfers   Sit to Stand 2  Maximal assistance   Additional items Assist x 2; Increased time required;Verbal cues; Bedrails;Armrests   Stand to Sit 2  Maximal assistance   Additional items Assist x 2; Increased time required;Verbal cues; Bedrails;Armrests   Stand pivot Unable to assess   Additional Comments Pt required A x2 to complete sit to stand from bedside recliner chair w/ max A x2 and B knee block. Functional Mobility   Additional Comments NT, will continue to assess   ROM- Right Upper Extremities   R Shoulder   (elevation / retraction 5X)   ROM - Left Upper Extremities    L Shoulder   (elevation / retraction 5X)   Cognition   Overall Cognitive Status   (alert, responsive and able to follow directions; answer yes/ no)   Arousal/Participation Alert; Cooperative   Attention Attends with cues to redirect   Orientation Level Oriented to person   Memory   (Will continue to assess)   Following Commands Follows one step commands with increased time or repetition   Comments Identified pt by name and wristband   Activity Tolerance   Activity Tolerance Patient limited by fatigue;Treatment limited secondary to medical complications (Comment)   Medical Staff Made Aware care coordination w/ PT, Rafa Martin due to decreased activity tolerance, regression from baseline and skilled physical assistance required. Care coordination w/ RT and spokew/ RNJayesh   Assessment   Assessment Pt seen for skilled OT tx session focusing on activity engagement and challenging activity tolerance. Pt agreeable to participate and motivated to return home. Pt performed sit <> stand 3X from bedside recliner chair w/ max A x2. Pt engaged in and tolerated B UE AROM AG to elevate and retract shoulders. Therapist educated pt on on AROM head / neck due to observed L head turn / perference. Pt engaged in BLD w/ max A. Continue to recommend post acute rehab when medically stable for discharge from acute care.  Will continue to follow Plan   Treatment Interventions ADL retraining;Functional transfer training; Endurance training;UE strengthening/ROM; Patient/family training;Cognitive reorientation;Equipment evaluation/education; Compensatory technique education;Continued evaluation; Energy conservation; Activityengagement   Goal Expiration Date 09/20/23   OT Treatment Day 3  (Monday 9/18/23)   OT Frequency 3-5x/wk   Recommendation   OT Discharge Recommendation Post acute rehabilitation services   AM-PAC Daily Activity Inpatient   Lower Body Dressing 1   Bathing 2   Toileting 1   Upper Body Dressing 2   Grooming 3   Eating 1   Daily Activity Raw Score 10   Turning Head Towards Sound 3   Follow Simple Instructions 3   Low Function Daily Activity Raw Score 16   Low Function Daily Activity Standardized Score  27.65   Barthel Index   Feeding 0   Bathing 0   Grooming Score 0   Dressing Score 5   Bladder Score 0   Bowels Score 0   Toilet Use Score 0   Transfers (Bed/Chair) Score 5   Mobility (Level Surface) Score 0   Stairs Score 0   Barthel Index Score 10   End of Consult   Patient Position at End of Consult Bed/Chair alarm activated; All needs within reach; Bedside chair   Nurse Communication Nurse aware of consult   Licensure   [address]  License Number  Gageashish AltamiranoCHADR/SHANIA [id number]     Goals established to promote Patient Goals: unable to state as pt is intubated:      Eating: mod assist; NOT ADRESSED    Grooming: min assist seated; PROGRESSING    Bathing: mod assist;     Upper Body Dressing mod assist; PROGRESSING    Lower Body Dressing: mod assist;     Toileting: mod assist;     Patient will increase ambulatory standard toilet transfer to mod assist with rolling walker to increase performance and safety with ADLS and functional mobility;      Patient will increase standing tolerance to 2 minutes during ADL task to decrease assistance level and decrease fall risk; PROGRESSING    Patient will increase bed mobility to mod assist in preparation for ADLS and transfers; PROGRESSING    Patient will increase functional mobility to and from bathroom with rolling walker with mod assist to increase performance with ADLS and to use a toilet; PROGRESSING    Patient will tolerate 5 minutes of UE ROM/strengthening to increase general activity tolerance and performance in ADLS/IADLS; PROGRESSING    Patient will improve functional activity tolerance to 10 minutes of sustained functional tasks to increase participation in basic self-care and decrease assistance level;       Patient will increase dynamic sitting balance to fair- to improve the ability to sit at edge of bed or on a chair for ADLS;      Patient will increase dynamic standing balance to poor+ to improve postural stability and decrease fall risk during standing ADLS and transfers.     Scottie Parents, OTR/L  [id number]  [id number]

## 2023-09-18 NOTE — PLAN OF CARE
Problem: OCCUPATIONAL THERAPY ADULT  Goal: Performs self-care activities at highest level of function for planned discharge setting. See evaluation for individualized goals. Description: Treatment Interventions: ADL retraining, Functional transfer training, UE strengthening/ROM, Endurance training, Patient/family training, Equipment evaluation/education, Activityengagement, Compensatory technique education          See flowsheet documentation for full assessment, interventions and recommendations. Outcome: Progressing  Note: Limitation: Decreased ADL status, Decreased UE strength, Decreased Safe judgement during ADL, Decreased endurance, Decreased self-care trans, Decreased high-level ADLs (decreased balance and mobility)  Prognosis: Fair  Assessment: Pt seen for skilled OT tx session focusing on activity engagement and challenging activity tolerance. Pt agreeable to participate and motivated to return home. Pt performed sit <> stand 3X from bedside recliner chair w/ max A x2. Pt engaged in and tolerated B UE AROM AG to elevate and retract shoulders. Therapist educated pt on on AROM head / neck due to observed L head turn / perference. Pt engaged in BLD w/ max A. Continue to recommend post acute rehab when medically stable for discharge from acute care.  Will continue to follow     OT Discharge Recommendation: Post acute rehabilitation services

## 2023-09-18 NOTE — ASSESSMENT & PLAN NOTE
Patient initially presented to [facility] on 8/23 with hypoxia and encephalopathy. Acute change in GCS prompted rapid response and subsequent intubation for airway protection. Of note, pt w/ recent hospitalization to [facility] 8/11-8/17 for encephalopathy with unclear etiology despite extensive workup but Wernicke's encephalopathy was on the differential.    ·  [place] workup 8/11:  · Rapid response and intubation on 8/12 for airway protection, extubated on 8/13  · CT head 8/11: No acute intracranial abnormality. Stable small bilateral subcortical hypoattenuating foci. No associated mass effect. · MRI 8/12: No acute intracranial abnormality. Stable nonspecific enhancement along the anterior floor of the third ventricle/hypothalamus compared to March 2023 study. Findings below:  · VEEG 8/12: negative for seizure activity. Possible findings relating to underlying sleep disorder   · LP: Grossly negative overall  · DSPO: Treated with high dose thiamine. Psych evaluated, possible bipolar disorder. Discharged to home with his sister with visiting nurses, ST/PT/OT  · [facility] ([place]) 8/23: Presented with hypoxia found by visiting nurse SPO2 in 80's  · 8/24: RRT > intubated for airway protection   · Transferred to [facility] on 8/24  ·  [place] 8/24 (Current hospital course)  · CT head 8/24: No acute intracranial abnormality  · 8/25 EEG: No evidence of electrographic seizures. Mild background slowing suggestive of underlying cerebral dysfunction from toxic/metablic/infectious/hypoxic encephalopathy. Clinical correlation is recommended  · Ach binding and blocking Ab negative   · MUSK Ab and ACHR modulating Ab neg  · Serum anti-TPO and DARIN Ab's, RPR, neg. CSF paraneoplastic panel neg  · Varicella PCR neg  · Toxoplasma antibodies negative  · Rheum labs negative  · 9/6 repeat LP cultures negative   · 9/7 Completed 7 days of Ceftriaxone to cover for possible neurosyphillis  · MRI 9/9: No acute intracranial pathology.  Unremarkable MRI of the orbits. Improving enhancement of the hypothalamus. 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   · 9/10 completed 3 days pulse dose steroids  · 9/11 completed 5 days IVIG  · VGCC serum test pending  · 9/15 EMG revealed no acute motor neuron disease     Plan:  · F/u outpatient with neurology in 6-8 weeks ; no clear diagnosis at this time  · PT/OT, discuss with case management regarding home services

## 2023-09-18 NOTE — PHYSICAL THERAPY NOTE
PT TREATMENT     09/18/23 1315   PT Last Visit   PT Visit Date 09/18/23   Note Type   Note Type Treatment   Pain Assessment   Pain Assessment Tool Choi-Baker FACES   Choi-Baker FACES Pain Rating 0   Restrictions/Precautions   Other Precautions Chair Alarm; Bed Alarm;Multiple lines;O2;Fall Risk   General   Chart Reviewed Yes   Family/Caregiver Present No   Cognition   Arousal/Participation Cooperative   Subjective   Subjective Patient remains nonverbal with trach in ICU   Bed Mobility   Additional Comments Patient sitting in bedside chair upon arrival by therapist having been transferred via mechanical lift by nursing staff   Transfers   Sit to Stand 2  Maximal assistance   Additional items Assist x 2   Stand to Sit 3  Moderate assistance   Additional items Assist x 2   Additional Comments Assist required for knee blocking to assist and upright standing. Verbal and tactile cueing for gluteal use for upright standing as well. Patient stooled x3 for up to 45 seconds at a time with respiratory therapist present for home O2 qualifier   Balance   Static Sitting Fair -   Dynamic Sitting Poor   Static Standing Poor +   Dynamic Standing Poor   Activity Tolerance   Activity Tolerance Patient tolerated treatment well;Patient limited by fatigue;Treatment limited secondary to medical complications (Comment)  (patient qualified for home O2 dropping to 86% on room air)   Nurse Made Aware yes   Exercises   Hamstring Stretch Sitting;5 reps;PROM; Bilateral   Quad Sets Sitting;10 reps;Bilateral   Heelslides Sitting;10 reps;AAROM; Bilateral   Hip Flexion Sitting;10 reps;Bilateral  (alternating)   Hip Abduction Sitting;10 reps;Bilateral  (alternating)   Knee AROM Long Arc Quad Sitting;10 reps;Bilateral  (alternating)   Ankle Pumps Sitting;10 reps;Bilateral   Balance training  sitting balance activity completed with weight shifting completed   Assessment   Assessment patient cooperative and demonstrating improving transfer and standing strength. Patient will benefit from continued PT with progression as tolerated. The patient's [facility] Basic Mobility Inpatient Short Form Raw Score is 6. A Raw score of less than or equal to 16 suggests the patient may benefit from discharge to post-acute rehabilitation services. Please also refer to the recommendation of the Physical Therapist for safe discharge planning. Plan   Treatment/Interventions ADL retraining;Functional transfer training;LE strengthening/ROM; Therapeutic exercise; Endurance training;Patient/family training;Equipment eval/education; Bed mobility;Gait training; Compensatory technique education   PT Frequency Other (Comment)  (5x/seek)   Recommendation   PT Discharge Recommendation Post acute rehabilitation services   [facility] Basic Mobility Inpatient   Turning in Flat Bed Without Bedrails 1   Lying on Back to Sitting on Edge of Flat Bed Without Bedrails 1   Moving Bed to Chair 1   Standing Up From Chair Using Arms 1   Walk in Room 1   Climb 3-5 Stairs With Railing 1   Basic Mobility Inpatient Raw Score 6   Turning Head Towards Sound 4   Follow Simple Instructions 4   Low Function Basic Mobility Raw Score  14   Low Function Basic Mobility Standardized Score  22.01   Highest Level Of Mobility   JH-HLM Goal 2: Bed activities/Dependent transfer   JH-HLM Achieved 5: Stand (1 or more minutes)   Education   Patient Demonstrates acceptance/verbal understanding;Explanation/teachback used   Licensure   [place] License Number  Juve Montiel PT [id number]

## 2023-09-18 NOTE — ASSESSMENT & PLAN NOTE
Received fax from [facility] requesting refill of clotrimazole-betamethasone cream. · Umbilical hernia present, easily reducible

## 2023-09-18 NOTE — RESPIRATORY THERAPY NOTE
Home Oxygen Qualifying Test     Patient name: Alcides Camacho        : 1985   Date of Test:  2023  Diagnosis:    Home Oxygen Test:    Medicare Guidelines require item(s) 1-5 on all ambulatory patients or 1 and 2 on non-ambulatory patients. 1. Baseline SPO2 on Room Air at rest 94 %   a. If <= 88% on Room Air add O2 via NC to obtain SpO2 >=88%. If LPM needed, document LPM  needed to reach =>88%    2. SPO2 during exertion on Room Air   %  a. During exertion monitor SPO2. If SPO2 increases >=89%, do not add supplemental oxygen    3. SPO2 on Oxygen at Rest  % at  LPM    4. SPO2 during exertion on Oxygen  % at  LPM    5. Test performed during exertion activity. []  Supplemental Home Oxygen is indicated. [x]  Client does not qualify for home oxygen. Respiratory Additional Notes- Pt does not ambulate- test not performed with exertion. Pt is trached.   [employer], RT

## 2023-09-18 NOTE — ASSESSMENT & PLAN NOTE
· 8/12-8/13: intubated for airway protection when acutely had GCS of 5 at [facility]  · 8/23 CTA PE study: PE in the right lower lobe segmental pulmonary artery  · 8/24 RRT > intubated for hypoxia + airway protection   · 8/25: Bronchoscopy: poorly tolerated secondary to hypoxia. Some mucus plugging present on the left. · 8/26: CT chest: Complete right lower lobe and near complete left lower lobe atelectasis + edema  · 8/31: Completed Zosyn x 7 days   · 9/8: Mahsa Massing placed    Plan:  · Patient has been on trach collar since 9/15, tolerating 8L via Tpiece  · Patient does require Q4 suctioning, + strong cough but unable to fully clear secretions without suctioning  · Plan for D/C today to home with family.  Home vent delivered   · Follow up with case management    Plan:  · Continue trach collar   · Continue pulmonary hygiene   · Daily NIF's  · VAP ppx; chlorhexidine, PPI, HOB greater than 30 degrees  · Plan for d/c home Monday

## 2023-09-19 ENCOUNTER — TELEPHONE (OUTPATIENT)
Dept: CARDIOLOGY CLINIC | Facility: CLINIC | Age: 38
End: 2023-09-19

## 2023-09-19 PROBLEM — I10 HYPERTENSION: Chronic | Status: RESOLVED | Noted: 2022-12-10 | Resolved: 2023-09-19

## 2023-09-19 LAB
DME PARACHUTE DELIVERY DATE ACTUAL: NORMAL
DME PARACHUTE DELIVERY DATE ACTUAL: NORMAL
DME PARACHUTE DELIVERY DATE EXPECTED: NORMAL
DME PARACHUTE DELIVERY DATE REQUESTED: NORMAL
DME PARACHUTE DELIVERY DATE REQUESTED: NORMAL
DME PARACHUTE DELIVERY NOTE: NORMAL
DME PARACHUTE ITEM DESCRIPTION: NORMAL
DME PARACHUTE ORDER STATUS: NORMAL
DME PARACHUTE ORDER STATUS: NORMAL
DME PARACHUTE SUPPLIER NAME: NORMAL
DME PARACHUTE SUPPLIER NAME: NORMAL
DME PARACHUTE SUPPLIER PHONE: NORMAL
DME PARACHUTE SUPPLIER PHONE: NORMAL

## 2023-09-19 PROCEDURE — 86334 IMMUNOFIX E-PHORESIS SERUM: CPT | Performed by: NURSE PRACTITIONER

## 2023-09-19 PROCEDURE — 86335 IMMUNFIX E-PHORSIS/URINE/CSF: CPT | Performed by: NURSE PRACTITIONER

## 2023-09-19 PROCEDURE — 84166 PROTEIN E-PHORESIS/URINE/CSF: CPT | Performed by: NURSE PRACTITIONER

## 2023-09-19 PROCEDURE — 94669 MECHANICAL CHEST WALL OSCILL: CPT

## 2023-09-19 PROCEDURE — 83520 IMMUNOASSAY QUANT NOS NONAB: CPT | Performed by: NURSE PRACTITIONER

## 2023-09-19 PROCEDURE — 94760 N-INVAS EAR/PLS OXIMETRY 1: CPT

## 2023-09-19 PROCEDURE — 99232 SBSQ HOSP IP/OBS MODERATE 35: CPT | Performed by: PSYCHIATRY & NEUROLOGY

## 2023-09-19 PROCEDURE — 94640 AIRWAY INHALATION TREATMENT: CPT

## 2023-09-19 PROCEDURE — 84165 PROTEIN E-PHORESIS SERUM: CPT | Performed by: NURSE PRACTITIONER

## 2023-09-19 PROCEDURE — 94668 MNPJ CHEST WALL SBSQ: CPT

## 2023-09-19 PROCEDURE — 99232 SBSQ HOSP IP/OBS MODERATE 35: CPT | Performed by: ANESTHESIOLOGY

## 2023-09-19 RX ADMIN — ALBUTEROL SULFATE 2.5 MG: 2.5 SOLUTION RESPIRATORY (INHALATION) at 19:23

## 2023-09-19 RX ADMIN — ALBUTEROL SULFATE 2.5 MG: 2.5 SOLUTION RESPIRATORY (INHALATION) at 13:06

## 2023-09-19 RX ADMIN — APIXABAN 5 MG: 5 TABLET, FILM COATED ORAL at 08:45

## 2023-09-19 RX ADMIN — CHLORHEXIDINE GLUCONATE 15 ML: 1.2 RINSE ORAL at 20:44

## 2023-09-19 RX ADMIN — ALBUTEROL SULFATE 2.5 MG: 2.5 SOLUTION RESPIRATORY (INHALATION) at 01:08

## 2023-09-19 RX ADMIN — THIAMINE HCL TAB 100 MG 100 MG: 100 TAB at 08:45

## 2023-09-19 RX ADMIN — APIXABAN 5 MG: 5 TABLET, FILM COATED ORAL at 17:33

## 2023-09-19 RX ADMIN — Medication 1000 UNITS: at 08:45

## 2023-09-19 RX ADMIN — CHLORHEXIDINE GLUCONATE 15 ML: 1.2 RINSE ORAL at 08:45

## 2023-09-19 RX ADMIN — FLUOXETINE 10 MG: 10 CAPSULE ORAL at 08:46

## 2023-09-19 RX ADMIN — ALBUTEROL SULFATE 2.5 MG: 2.5 SOLUTION RESPIRATORY (INHALATION) at 07:40

## 2023-09-19 NOTE — PROGRESS NOTES
[id number] [facility]  Progress Note  Name: Marice Heimlich  MRN: [id number]  Unit/Bed#: ICU 01 I Date of Admission: 8/24/2023   Date of Service: 9/19/2023 I Hospital Day: 32    Assessment/Plan   * Acute respiratory failure with hypoxia [facility])  Assessment & Plan  · 8/12-8/13: intubated for airway protection when acutely had GCS of 5 at \[facility]\""  · 8/23 CTA PE study: PE in the right lower lobe segmental pulmonary artery  · 8/24 RRT > intubated for hypoxia + airway protection   · 8/25: Bronchoscopy: poorly tolerated secondary to hypoxia. Some mucus plugging present on the left. · 8/26: CT chest: Complete right lower lobe and near complete left lower lobe atelectasis + edema  · 8/31: Completed Zosyn x 7 days   · 9/8: [place] placed    Plan:  · Patient has been on 28%  trach collar since 9/15  · Patient does require Q4 suctioning, + strong cough but unable to fully clear secretions without suctioning  · Plan for D/C today to home with family. Home vent to be delivered   · Plan for d/c home today  · Follow up with Dr. Ricardo Best (pul) outpatient for f/u with home vent/trach management    Neuromuscular weakness [facility])  Assessment & Plan  Patient initially presented to [facility] on 8/23 with hypoxia and encephalopathy. Acute change in GCS prompted rapid response and subsequent intubation for airway protection. Of note, pt w/ recent hospitalization to \[facility]\"" 8/11-8/17 for encephalopathy with unclear etiology despite extensive workup but Wernicke's encephalopathy was on the differential.    · [facility] [place] workup 8/11:  · Rapid response and intubation on 8/12 for airway protection, extubated on 8/13  · CT head 8/11: No acute intracranial abnormality. Stable small bilateral subcortical hypoattenuating foci. No associated mass effect. · MRI 8/12: No acute intracranial abnormality. Stable nonspecific enhancement along the anterior floor of the third ventricle/hypothalamus compared to March 2023 study.  Findings below:  · VEEG 8/12: negative for seizure activity. Possible findings relating to underlying sleep disorder   · LP: Grossly negative overall  · DSPO: Treated with high dose thiamine. Psych evaluated, possible bipolar disorder. Discharged to home with his sister with visiting nurses, ST/PT/OT  · [facility] ([place]) 8/23: Presented with hypoxia found by visiting nurse SPO2 in 80's  · 8/24: RRT > intubated for airway protection   · [facility]  · [facility] 8/24 (Current hospital course)  · CT head 8/24: No acute intracranial abnormality  · 8/25 EEG: No evidence of electrographic seizures. Mild background slowing suggestive of underlying cerebral dysfunction from toxic/metablic/infectious/hypoxic encephalopathy. Clinical correlation is recommended  · Ach binding and blocking Ab negative   · MUSK Ab and ACHR modulating Ab neg  · Serum anti-TPO and DARIN Ab's, RPR, neg. CSF paraneoplastic panel neg  · Varicella PCR neg  · Toxoplasma antibodies negative  · Rheum labs negative  · 9/6 repeat LP cultures negative   · 9/7 Completed 7 days of Ceftriaxone to cover for possible neurosyphillis  · MRI 9/9: No acute intracranial pathology. Unremarkable MRI of the orbits. Improving enhancement of the hypothalamus. 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   · 9/10 completed 3 days pulse dose steroids  · 9/11 completed 5 days IVIG  · VGCC serum test pending  · 9/15 EMG revealed no acute motor neuron disease     Plan:  · F/u outpatient with neurology in 6-8 weeks ; no clear diagnosis at this time  · PT/OT, discuss with case management regarding home services    Acute pulmonary embolism without acute cor pulmonale ([address])  Assessment & Plan  · Continue Eliquis     Status post tracheostomy ([address])  Assessment & Plan  · Trach care  · Follow up with surgery OP for removal of remaining trach sutures    S/P percutaneous endoscopic gastrostomy (PEG) tube placement [facility])  Assessment & Plan  · Continue tube feeds at goal     History of LVH (left ventricular hypertrophy)  Assessment & Plan  Previous echocardiogram 6/23 showed EF 64%, normal diastolic dysfunction, mild left ventricular hypertrophy, mild RVSP elevation and mild TR  · PTA Med: Torsemide 20 mg daily  · Evaluated by Heart Failure team in July 2023. LVH thought to be secondary to hypertension, obesity, hx of meth use  · Echo this admission shows normal EF of 60%, normal LV thickness, no diastolic dysfunction  · Remains off torsemide     Depression  Assessment & Plan  · Fluoxetine 10 mg daily    Seminoma of left testis [facility])  Assessment & Plan  · Testicular cancer s/p left orchiectomy on 12/2022  · Repeat CT scan in January 2023 with enlarging lymph node. · Began Chemo in March 2023: Etoposide and Cisplatin with plan for 4 cycles, 5 days every 21 days  · Did not complete chemo treatment due to adverse effects  · After 3rd cycle reported double vision, labile affect and right sided weakness and therefore chemotherapy was stopped on 5/26/23  · Continue outpatient follow up with Heme/Onc     Umbilical hernia without obstruction and without gangrene  Assessment & Plan  · Umbilical hernia present, easily reducible     Hypertension-resolved as of 9/19/2023  Assessment & Plan  · Patient has been normotensive. Remains off anti-hypertensives            ICU Core Measures     Vented Patient  VAP Bundle  VAP bundle ordered     A: Assess, Prevent, and Manage Pain · Has pain been assessed? Yes  · Need for changes to pain regimen? No   B: Both Spontaneous Awakening Trials (SATs) and Spontaneous Breathing Trials (SBTs) · Plan to perform spontaneous awakening trial today? N/A   · Plan to perform spontaneous breathing trial today? N/A   · Obvious barriers to extubation? NA   C: Choice of Sedation · RASS Goal: N/A patient not on sedation  · Need for changes to sedation or analgesia regimen?  NA   D: Delirium · CAM-ICU: Negative   E: Early Mobility  · Plan for early mobility? Yes   F: Family Engagement · Plan for family engagement today? Yes       Review of Invasive Devices:      Central access plan: unaccessed port       Prophylaxis:  VTE VTE covered by:  apixaban, Oral, 5 mg at 09/18/23 1820       Stress Ulcer  not ordered       Subjective   HPI/24hr events: No acute overnight events. Remains on 28% trach collar. Plan to D/C home today. Review of Systems   All other systems reviewed and are negative. Objective                            Vitals I/O      Most Recent Min/Max in 24hrs   Temp 97.8 °F (36.6 °C) Temp  Min: 97.7 °F (36.5 °C)  Max: 98.8 °F (37.1 °C)   Pulse 77 Pulse  Min: 77  Max: 92   Resp (!) 39 Resp  Min: 21  Max: 52   /80 BP  Min: 101/64  Max: 139/93   O2 Sat 96 % SpO2  Min: 92 %  Max: 100 %      Intake/Output Summary (Last 24 hours) at 9/19/2023 0524  Last data filed at 9/19/2023 0000  Gross per 24 hour   Intake 20 ml   Output --   Net 20 ml         Diet Enteral/Parenteral; Tube Feeding No Oral Diet; Jevity 1.5; Continuous; 70; 200; Every 4 hours     Invasive Monitoring Physical exam   N/A Physical Exam  Eyes:      General: Lids are normal.      Extraocular Movements: Extraocular movements intact. Conjunctiva/sclera: Conjunctivae normal.      Pupils: Pupils are equal, round, and reactive to light. Skin:     General: Skin is warm and dry. Capillary Refill: Capillary refill takes less than 2 seconds. HENT:      Head: Normocephalic and atraumatic. Neck:     Trachea: Tracheostomy present. Comments: Favors left sided neck position Cardiovascular:      Rate and Rhythm: Normal rate and regular rhythm. Pulses: Normal pulses. Radial pulses are 2+ on the right side and 2+ on the left side. Dorsalis pedis pulses are 2+ on the right side and 2+ on the left side. Heart sounds: Normal heart sounds, S1 normal and S2 normal.   Musculoskeletal:      Cervical back: Decreased range of motion.       Comments: Generalized weakness, HINES    Abdominal:      General: Bowel sounds are normal.      Palpations: Abdomen is soft. Comments: PEG tube in place   Constitutional:       General: He is awake. Appearance: Normal appearance. He is well-developed. Pulmonary:      Effort: Pulmonary effort is normal.      Breath sounds: Examination of the right-upper field reveals rhonchi. Decreased breath sounds and rhonchi present. Comments: Large amount of thick tan trach secretions   Psychiatric:         Mood and Affect: Mood and affect normal.         Behavior: Behavior normal. Behavior is cooperative. Judgment: Judgment normal.   Neurological:      General: No focal deficit present. Mental Status: He is alert and oriented to person, place, and time. GCS: GCS eye subscore is 4. GCS verbal subscore is 1. GCS motor subscore is 6.             Diagnostic Studies      No new imaging      Medications:  Scheduled PRN   albuterol, 2.5 mg, Q6H  apixaban, 5 mg, BID  chlorhexidine, 15 mL, Q12H ISAEL  cholecalciferol, 1,000 Units, Daily  FLUoxetine, 10 mg, Daily  thiamine, 100 mg, Daily      acetaminophen, 650 mg, Q4H PRN  polyethylene glycol, 17 g, Daily PRN  senna, 17.6 mg, Daily PRN       Continuous          Labs:    CBC    Recent Labs     09/17/23  0640   WBC 8.05   HGB 11.4*   HCT 35.0*        BMP    Recent Labs     09/17/23  0640   SODIUM 138   K 3.8      CO2 33*   AGAP 4   BUN 17   CREATININE 0.66   CALCIUM 8.7       Coags    No recent results     Additional Electrolytes  Recent Labs     09/17/23  0640   MG 2.1          Blood Gas    No recent results  No recent results LFTs  No recent results    Infectious  No recent results  Glucose  Recent Labs     09/17/23  0640   GLUC 1341 [facility], [facility]

## 2023-09-19 NOTE — ASSESSMENT & PLAN NOTE
· 8/12-8/13: intubated for airway protection when acutely had GCS of 5 at [facility]  · 8/23 CTA PE study: PE in the right lower lobe segmental pulmonary artery  · 8/24 RRT > intubated for hypoxia + airway protection   · 8/25: Bronchoscopy: poorly tolerated secondary to hypoxia. Some mucus plugging present on the left. · 8/26: CT chest: Complete right lower lobe and near complete left lower lobe atelectasis + edema  · 8/31: Completed Zosyn x 7 days   · 9/8: Estelita Garcia placed    Plan:  · Patient has been on 28%  trach collar since 9/15  · Patient does require Q4 suctioning, + strong cough but unable to fully clear secretions without suctioning  · Plan for D/C today to home with family.  Home vent to be delivered   · Plan for d/c home today  · Follow up with Dr. Colin Ramirez (pulm) outpatient for f/u with home vent/trach management

## 2023-09-19 NOTE — TELEPHONE ENCOUNTER
Called patient and left a message stating we are cancelling his appointment tomorrow and upon his d/c to give us a call to reschedule.

## 2023-09-19 NOTE — ASSESSMENT & PLAN NOTE
Patient initially presented to [facility] on 8/23 with hypoxia and encephalopathy. Acute change in GCS prompted rapid response and subsequent intubation for airway protection. Of note, pt w/ recent hospitalization to [facility] 8/11-8/17 for encephalopathy with unclear etiology despite extensive workup but Wernicke's encephalopathy was on the differential.    ·  [place] workup 8/11:  · Rapid response and intubation on 8/12 for airway protection, extubated on 8/13  · CT head 8/11: No acute intracranial abnormality. Stable small bilateral subcortical hypoattenuating foci. No associated mass effect. · MRI 8/12: No acute intracranial abnormality. Stable nonspecific enhancement along the anterior floor of the third ventricle/hypothalamus compared to March 2023 study. Findings below:  · VEEG 8/12: negative for seizure activity. Possible findings relating to underlying sleep disorder   · LP: Grossly negative overall  · DSPO: Treated with high dose thiamine. Psych evaluated, possible bipolar disorder. Discharged to home with his sister with visiting nurses, ST/PT/OT  · [facility] 8/23: Presented with hypoxia found by visiting nurse SPO2 in 80's  · 8/24: RRT > intubated for airway protection   · Transferred to [facility] on 8/24  · [facility] 8/24 (Current hospital course)  · CT head 8/24: No acute intracranial abnormality  · 8/25 EEG: No evidence of electrographic seizures. Mild background slowing suggestive of underlying cerebral dysfunction from toxic/metablic/infectious/hypoxic encephalopathy. Clinical correlation is recommended  · Ach binding and blocking Ab negative   · MUSK Ab and ACHR modulating Ab neg  · Serum anti-TPO and DARIN Ab's, RPR, neg. CSF paraneoplastic panel neg  · Varicella PCR neg  · Toxoplasma antibodies negative  · Rheum labs negative  · 9/6 repeat LP cultures negative   · 9/7 Completed 7 days of Ceftriaxone to cover for possible neurosyphillis  · MRI 9/9: No acute intracranial pathology.  Unremarkable MRI of the orbits. Improving enhancement of the hypothalamus. 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   · 9/10 completed 3 days pulse dose steroids  · 9/11 completed 5 days IVIG  · VGCC serum test pending  · 9/15 EMG revealed no acute motor neuron disease     Plan:  · F/u outpatient with neurology in 6-8 weeks ; no clear diagnosis at this time  · PT/OT, discuss with case management regarding home services

## 2023-09-19 NOTE — RESPIRATORY THERAPY NOTE
Education provided to patients family: sister and aunt. Education given on suctioning, and using ambu BVM to ventilate patient. Family was able to perform these interventions hands on with therapist watching.

## 2023-09-19 NOTE — PROGRESS NOTES
Neurology Consult Follow Up      Miguel Sherman is a 41 y.o. male  ICU 01/ICU 01    [id number]        Assessment/Recommendations:    1.  Toxic metabolic encephalopathy-resolved  2.  Acute respiratory failure with persistent weakness  3.  Recent pulmonary embolism  4.  Testicular seminoma  5.  Proximal weakness       -Critical care team management  -Neuro assessments  -Aspiration precautions  -Vent and trach management per critical care team  -IVIG 42.5 g IV daily x5 days and Solu-Medrol 1 g daily x3 days-to be considered in future if patient has reduction in strength and weakness upon discharge  -Recommend vitamin D repletion  -LP x2 with normal CSF protein, negative for infection including VDRL, crypto, meningitis panel, paraneoplastic panel. Serum anti-V GCC ab negative. hypocretin remains pending. Acetylcholine receptors and antiemetics negative, anti-TPO, DG, Sjogren's, ANCA normal.  UPEP SPEP and additional MAG studies pending. -MRI of the brain with orbits completed showing no acute intracranial pathology and unremarkable orbits. Noted improvement in enhancement of the hypothalamus with stable white matter changes  - EMG completed at bedside showed sensory demyelinating and axonal neuropathy with no acute denervation with needle EMG including genioglossus muscle. 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 Brought to [facility] after presenting to [address] [place] [address] with hypoxia and AMS. He had previously been in the hospital at [facility] and had extensive work-up for AMS as well. MRI of the brain with without contrast, LP, VEEG monitoring.   Patient had delay in treatment of testicular seminoma diagnosis noted in June 2021. Follow-up in December 2022 showed lymphadenopathy patient underwent radical left orchiectomy in December 2022.   Patient received chemotherapy which caused him numbness and tingling sensations however continued on with the treatment until he developed diplopia with right-sided weakness and labile affect therefore his therapies were just discontinued. Initial MRI of the brain which showed a white matter changes. 8/12/2023, [facility] neurology team obtained MRI of the brain which demonstrated nonspecific enhancements along the anterior floor of the third ventricle/hypothalamus. vEEG which demonstrated intermittent excessive diffuse delta activity during drowsiness and excessive slow-wave and REM sleep periods.  Patient had 2 episodes of leg and body jerking occurring during this test while in REM sleep periods suggestive of REM sleep atonia. There are questions with regards to the possibility of Warnicke's encephalopathy, he was started on IV thiamine however with treatment he had no improvements. Vitamin B1 level was 84. Patient is also found to be vitamin D deficient for which repletion was ordered. Patient had 2 LP test, both with normal CSF protein, negative for infections including VDRL, crypto, meningitis panel, [facility] paraneoplastic panel was negative, anti-TPO, DG and, Sjogren's, ANCA, acetylcholine receptor antibodies and antimusk  were negative. Patient continue to treat with anticoagulation for his bilateral PEs, developed sepsis and pneumonia for which she continued to treat on ventilatory support. Patient continues to have proximal weakness of unknown etiology. All of his neurological testing has been negative, we do not have a confirmed diagnosis however did note IVIG and Solu-Medrol can be used in the event that he weakens and worsens over time. EMG was completed at the bedside which did show some demyelinating and axonal neuropathies with no acute denervation, no sign of motor neuron disease. Patient has since been trached and pegged, he is anticipating a discharge to home with family interventions and therapies.   He is awake and oriented, his AMS has improved however patient continues to be weak specifically to the left upper and lower extremity however significant lower extremity weakness bilaterally. Additional studies are pending, added SPEP, UPEP and other MAG studies which we will continue to follow in the outpatient setting. Recommend follow-up with neurology attending in 4 weeks we will need to work with attending and obtaining an appropriate scheduled appointment.        Nacho Comes will need follow up in in 4 weeks with general attending. We will need to discuss scheduling with attending neurologist and her staff regarding scheduling this patient within the 4 weeks requested by the attending neurology MD.    Chief Complaint:    Subjective:   Patient sitting up in the chair, has trach and PEG. He is awake alert and able to follow commands. Reports weakness to the left upper and lower extremity however overall lower extremity weakness and heaviness in the legs.   Past Medical History:   Diagnosis Date   • Anxiety    • Cancer ([address])    • Depression    • Psychiatric disorder     bipolar     Social History     Socioeconomic History   • Marital status: Single     Spouse name: Not on file   • Number of children: Not on file   • Years of education: Not on file   • Highest education level: Not on file   Occupational History   • Not on file   Tobacco Use   • Smoking status: Former     Packs/day: 0.50     Years: 5.00     Total pack years: 2.50     Types: Cigarettes     Start date: 1/1/2008   • Smokeless tobacco: Never   Vaping Use   • Vaping Use: Never used   Substance and Sexual Activity   • Alcohol use: Not Currently     Comment: 2 cases of beer daily, stopped 3 years ago   • Drug use: Yes     Types: Marijuana     Comment: Medical marijuana   • Sexual activity: Not Currently   Other Topics Concern   • Not on file   Social History Narrative    ** Merged History Encounter **          Social Determinants of Health     Financial Resource Strain: Not on file   Food Insecurity: No Food Insecurity (8/29/2023)    Hunger Vital Sign    • Worried About Running Out of Food in the Last Year: Never true    • Ran Out of Food in the Last Year: Never true   Transportation Needs: No Transportation Needs (8/29/2023)    PRAPARE - Transportation    • Lack of Transportation (Medical): No    • Lack of Transportation (Non-Medical): No   Physical Activity: Not on file   Stress: Not on file   Social Connections: Not on file   Intimate Partner Violence: Not on file   Housing Stability: Low Risk  (8/12/2023)    Housing Stability Vital Sign    • Unable to Pay for Housing in the Last Year: No    • Number of Places Lived in the Last Year: 1    • Unstable Housing in the Last Year: No     Family History   Problem Relation Age of Onset   • Coronary artery disease Maternal Aunt        ROS:  Patient is nonverbal due to tracheostomy. No acknowledge complaints of headache, dizziness, lightheadedness, visual changes today, paresthesias. Patient does endorse feeling weakness primarily in the lower extremities, primarily at the left side. Objective:  /74   Pulse 81   Temp 97.5 °F (36.4 °C) (Temporal)   Resp (!) 35   Ht 6' 4" (1.93 m)   Wt 132 kg (290 lb)   SpO2 99%   BMI 35.30 kg/m²     General: alert   Mental status: oriented x3-assessment is limited due to trach and nonverbal  Attention: normal  Knowledge: fair  Language and Speech: Currently nonverbal due to tracheostomy in place  Cranial nerves:   CN II: Visual fields are full to confrontation. Has reported diplopia previously   fundoscopic exam is normal with sharp discs and no vascular changes. Pupils are 3 mm and briskly reactive to light. CN III, IV, VI: At primary gaze, there is no eye deviation. CN V: Facial sensation is intact in all 3 divisions bilaterally. Corneal responses are intact. CN VII: Face is symmetrical, with normal eye closure and smile.   CN VIII: Hearing is normal to rubbing fingers  CN IX, X: Palate elevates symmetrically. Phonation is normal.  CN XI: Head turning and shoulder shrug are intact  CN XII: Tongue is midline with normal movements and no atrophy. Motor: There is no pronator drift of out-stretched arms. Muscle bulk and tone are normal.   Patient has had no significant changes in his motor exam from prior evaluations. Muscle exam  Arm Right Left Leg Right Left   Deltoid   4+/5 3+/5 Iliopsoas 4-/5 4-/5   Biceps  4+/5 4/5 Quads 4-/5 4-/5   Triceps 4+/5 4/5 Hamstrings 4-/5 4-/5   Wrist Extension 4+/5 4/5 Ankle Dorsi Flexion 4/5 4/5   Wrist Flexion 4+/5 4/5 Ankle Plantar Flexion 4/5 4/5         Sensory: normal to light touch, temperature, vibration. Proprioception intact  Gait: Deferred for weakness and safety  Coordination: finger to nose normal although limited to the left upper extremity due to weakness. Unable to perform heel-to-shin due to lower extremity weakness. Reflexes    RJ BJ TJ KJ AJ Plantars Cornejo's   Right 2+ 2+  2+ 2+ Downgoing Not present   Left 2+ 2+  2+ 2+ Downgoing Not present      Heart: Regular rate and rhythm  Lung: On trach collar, no audible wheezing or stridor heard. Positive cough with secretions  Abd: soft, non-tender, non-distended   Skin: dry and intact    Labs:      Lab Results   Component Value Date    WBC 8.05 09/17/2023    HGB 11.4 (L) 09/17/2023    HCT 35.0 (L) 09/17/2023    MCV 97 09/17/2023     09/17/2023     Lab Results   Component Value Date    HGBA1C 5.2 12/10/2022     Lab Results   Component Value Date    ALT 55 (H) 08/26/2023    AST 23 08/26/2023    ALKPHOS 90 08/26/2023     Lab Results   Component Value Date    GLUCOSE 93 07/28/2023    CALCIUM 8.7 09/17/2023    K 3.8 09/17/2023    CO2 33 (H) 09/17/2023     09/17/2023    BUN 17 09/17/2023    CREATININE 0.66 09/17/2023         Review of reports and notes reveal:   Independent review of films/reports:  No results found. Thank you for this consult.     Total time of encounter:  30 min  More than 50% of the time was used in counseling and/or coordination of care  Extent of couseling and/or coordination of care with patient at bedside, medical team and attending neurology MD.

## 2023-09-19 NOTE — ASSESSMENT & PLAN NOTE
Previous echocardiogram 6/23 showed EF 23%, normal diastolic dysfunction, mild left ventricular hypertrophy, mild RVSP elevation and mild TR  · PTA Med: Torsemide 20 mg daily  · Evaluated by Heart Failure team in July 2023.  LVH thought to be secondary to hypertension, obesity, hx of meth use  · Echo this admission shows normal EF of 60%, normal LV thickness, no diastolic dysfunction  · Remains off torsemide

## 2023-09-19 NOTE — UTILIZATION REVIEW
Continued Stay Review    Date: 9/19                          Current Patient Class: Inpatient  Current Level of Care: CRITICAL CARE step down    HPI:38 y.o. male initially admitted on 8/24     Assessment/Plan: continues on 28% trach collar. Wakes to voice. No complaints, shallow breathing. ALtered mental status and weakness of unclear etiology. Pending acquisition DME for home, trach education . Continue aggressive pulmonary toilet. Currently normotensive    Vital Signs:   ate/Time Temp Pulse Resp BP MAP (mmHg) SpO2 FiO2 (%) Calculated FIO2 (%) - Nasal Cannula O2 Flow Rate (L/min) Nasal Cannula O2 Flow Rate (L/min) O2 Device Patient Position - Orthostatic VS   09/19/23 1307 -- -- -- -- -- 97 % 28 44 6 L/min 6 L/min T-Piece --   09/19/23 1100 -- 81 33 Abnormal  110/58 78 95 % -- -- -- -- -- --   09/19/23 1000 -- 68 21 118/80 95 100 % -- -- -- -- -- --   09/19/23 0900 -- 67 32 Abnormal  109/76 89 97 % -- -- -- -- -- --   09/19/23 0800 97.5 °F (36.4 °C) 81 22 99/66 78 98 % -- -- -- -- T-Piece Lying   09/19/23 0740 -- -- -- -- -- 99 % 28 44 -- 6 L/min T-Piece --   09/19/23 0630 -- 80 29 Abnormal  114/75 90 99 % -- -- -- -- -- --   09/19/23 0500 -- 83 24 Abnormal  116/76 91 98 % -- -- -- -- -- --   09/19/23 0400 -- 77 39 Abnormal  115/80 94 96 % -- -- -- -- -- --   09/19/23 0300 -- 86 39 Abnormal  108/69 83 96 % -- -- -- -- -- --   09/19/23 0200 -- 80 36 Abnormal  120/78 94 98 % -- -- -- -- -- --   09/19/23 0109 -- -- -- -- -- 96 % 28 40 -- 5 L/min T-Piece --   09/19/23 0100 -- 81 27 Abnormal  115/70 87 97 % -- -- -- -- -- --   09/19/23 0000 97.8 °F (36.6 °C) 80 35 Abnormal  104/64 79 96 % -- -- -- -- T-Piece        Pertinent Labs/Diagnostic Results:       Results from last 7 days   Lab Units 09/17/23  0640   WBC Thousand/uL 8.05   HEMOGLOBIN g/dL 11.4*   HEMATOCRIT % 35.0*   PLATELETS Thousands/uL 213   NEUTROS ABS Thousands/µL 5.47         Results from last 7 days   Lab Units 09/17/23  0640 09/14/23  0500 09/13/23  0535   SODIUM mmol/L 138 140 142   POTASSIUM mmol/L 3.8 4.3 3.7   CHLORIDE mmol/L 101 105 107   CO2 mmol/L 33* 32 29   ANION GAP mmol/L 4 3 6   BUN mg/dL 17 16 18   CREATININE mg/dL 0.66 0.57* 0.58*   EGFR ml/min/1.73sq m 124 132 131   CALCIUM mg/dL 8.7 8.8 8.4   MAGNESIUM mg/dL 2.1 2.2  --    PHOSPHORUS mg/dL  --  3.4  --              Results from last 7 days   Lab Units 09/17/23  0640 09/14/23  0500 09/13/23  0535   GLUCOSE RANDOM mg/dL 124 121 99       Results from last 7 days   Lab Units 09/16/23  1349   PH ART  7.443   PCO2 ART mm Hg 47.8*   PO2 ART mm Hg 85.7   HCO3 ART mmol/L 32.0*   BASE EXC ART mmol/L 6.8   O2 CONTENT ART mL/dL 17.7   O2 HGB, ARTERIAL % 95.5   ABG SOURCE  Radial, Right         Medications:   Scheduled Medications:  albuterol, 2.5 mg, Nebulization, Q6H  apixaban, 5 mg, Oral, BID  chlorhexidine, 15 mL, Mouth/Throat, Q12H ISAEL  cholecalciferol, 1,000 Units, Oral, Daily  FLUoxetine, 10 mg, Oral, Daily  thiamine, 100 mg, Oral, Daily      Continuous IV Infusions:     PRN Meds:  acetaminophen, 650 mg, Oral, Q4H PRN  polyethylene glycol, 17 g, Oral, Daily PRN  senna, 17.6 mg, Oral, Daily PRN        Discharge Plan: D    Network Utilization Review Department  ATTENTION: Please call with any questions or concerns to [phone] and carefully listen to the prompts so that you are directed to the right person. All voicemails are confidential.  Doug Console all requests for admission clinical reviews, approved or denied determinations and any other requests toTBD dedicated fax number below belonging to the campus where the patient is receiving treatment.  List of dedicated fax numbers for the Facilities:  [place] DENIALS (Administrative/Medical Necessity) [phone] 2303 [facility] (Maternity/NICU/Pediatrics) [address] [facility] [address] [facility] [address] [address] [facility] [address] [facility] [address] [place] [address] [facility] [phone]

## 2023-09-20 LAB
ANION GAP SERPL CALCULATED.3IONS-SCNC: 4 MMOL/L
BUN SERPL-MCNC: 16 MG/DL (ref 5–25)
CALCIUM SERPL-MCNC: 9.1 MG/DL (ref 8.4–10.2)
CHLORIDE SERPL-SCNC: 100 MMOL/L (ref 96–108)
CO2 SERPL-SCNC: 36 MMOL/L (ref 21–32)
CREAT SERPL-MCNC: 0.71 MG/DL (ref 0.6–1.3)
ERYTHROCYTE [DISTWIDTH] IN BLOOD BY AUTOMATED COUNT: 14.7 % (ref 11.6–15.1)
GFR SERPL CREATININE-BSD FRML MDRD: 120 ML/MIN/1.73SQ M
GLUCOSE SERPL-MCNC: 96 MG/DL (ref 65–140)
HCT VFR BLD AUTO: 34.8 % (ref 36.5–49.3)
HGB BLD-MCNC: 11 G/DL (ref 12–17)
MAGNESIUM SERPL-MCNC: 2.3 MG/DL (ref 1.9–2.7)
MCH RBC QN AUTO: 31.3 PG (ref 26.8–34.3)
MCHC RBC AUTO-ENTMCNC: 31.6 G/DL (ref 31.4–37.4)
MCV RBC AUTO: 99 FL (ref 82–98)
MISCELLANEOUS LAB TEST RESULT: NORMAL
PLATELET # BLD AUTO: 239 THOUSANDS/UL (ref 149–390)
PMV BLD AUTO: 10.5 FL (ref 8.9–12.7)
POTASSIUM SERPL-SCNC: 3.7 MMOL/L (ref 3.5–5.3)
RBC # BLD AUTO: 3.51 MILLION/UL (ref 3.88–5.62)
SODIUM SERPL-SCNC: 140 MMOL/L (ref 135–147)
WBC # BLD AUTO: 9.55 THOUSAND/UL (ref 4.31–10.16)

## 2023-09-20 PROCEDURE — 94669 MECHANICAL CHEST WALL OSCILL: CPT

## 2023-09-20 PROCEDURE — 94640 AIRWAY INHALATION TREATMENT: CPT

## 2023-09-20 PROCEDURE — 99232 SBSQ HOSP IP/OBS MODERATE 35: CPT | Performed by: ANESTHESIOLOGY

## 2023-09-20 PROCEDURE — 83735 ASSAY OF MAGNESIUM: CPT | Performed by: NURSE PRACTITIONER

## 2023-09-20 PROCEDURE — 94760 N-INVAS EAR/PLS OXIMETRY 1: CPT

## 2023-09-20 PROCEDURE — 85027 COMPLETE CBC AUTOMATED: CPT | Performed by: NURSE PRACTITIONER

## 2023-09-20 PROCEDURE — 80048 BASIC METABOLIC PNL TOTAL CA: CPT | Performed by: NURSE PRACTITIONER

## 2023-09-20 RX ORDER — ALBUTEROL SULFATE 2.5 MG/3ML
2.5 SOLUTION RESPIRATORY (INHALATION) EVERY 6 HOURS PRN
Status: DISCONTINUED | OUTPATIENT
Start: 2023-09-20 | End: 2023-09-23 | Stop reason: HOSPADM

## 2023-09-20 RX ORDER — POTASSIUM CHLORIDE 20MEQ/15ML
40 LIQUID (ML) ORAL ONCE
Status: COMPLETED | OUTPATIENT
Start: 2023-09-20 | End: 2023-09-20

## 2023-09-20 RX ADMIN — Medication 1000 UNITS: at 08:01

## 2023-09-20 RX ADMIN — CHLORHEXIDINE GLUCONATE 15 ML: 1.2 RINSE ORAL at 08:01

## 2023-09-20 RX ADMIN — POTASSIUM CHLORIDE 40 MEQ: 1.5 SOLUTION ORAL at 08:01

## 2023-09-20 RX ADMIN — CHLORHEXIDINE GLUCONATE 15 ML: 1.2 RINSE ORAL at 20:43

## 2023-09-20 RX ADMIN — APIXABAN 5 MG: 5 TABLET, FILM COATED ORAL at 17:28

## 2023-09-20 RX ADMIN — THIAMINE HCL TAB 100 MG 100 MG: 100 TAB at 08:01

## 2023-09-20 RX ADMIN — FLUOXETINE 10 MG: 10 CAPSULE ORAL at 08:02

## 2023-09-20 RX ADMIN — ALBUTEROL SULFATE 2.5 MG: 2.5 SOLUTION RESPIRATORY (INHALATION) at 13:15

## 2023-09-20 RX ADMIN — ALBUTEROL SULFATE 2.5 MG: 2.5 SOLUTION RESPIRATORY (INHALATION) at 01:31

## 2023-09-20 RX ADMIN — ALBUTEROL SULFATE 2.5 MG: 2.5 SOLUTION RESPIRATORY (INHALATION) at 07:34

## 2023-09-20 RX ADMIN — APIXABAN 5 MG: 5 TABLET, FILM COATED ORAL at 08:01

## 2023-09-20 NOTE — ASSESSMENT & PLAN NOTE
· 8/12-8/13: intubated for airway protection when acutely had GCS of 5 at [facility]  · 8/23 CTA PE study: PE in the right lower lobe segmental pulmonary artery  · 8/24 RRT > intubated for hypoxia + airway protection   · 8/25: Bronchoscopy: poorly tolerated secondary to hypoxia. Some mucus plugging present on the left.   · 8/26: CT chest: Complete right lower lobe and near complete left lower lobe atelectasis + edema  · 8/31: Completed Zosyn x 7 days   · 9/8: Jenny Scott placed    Plan:  · Patient has been on 28%  trach collar since 9/15  · Patient does require Q4 suctioning, + strong cough but unable to fully clear secretions without suctioning  · Home vent delivered, pending home electrical check by vent company today  · Plan for d/c home today  · Follow up with Dr. Karl Ventura (pulm) outpatient for f/u with home vent/trach management

## 2023-09-20 NOTE — CASE MANAGEMENT
Case Management Progress Note    Patient name Nacho Comes  Location ICU 01/ICU 01 MRN [id number]  : 1985 Date 2023       LOS (days): 27  Geometric Mean LOS (GMLOS) (days):   Days to GMLOS:        OBJECTIVE:    Current admission status: Inpatient  Preferred Pharmacy:   [facility], [address] [facility]  [address]  Phone: [phone] Fax: [phone]    Primary Care Provider: Stu Shipley MD    Primary Insurance: Chinmay Castro  Secondary Insurance:     PROGRESS NOTE:    SW following to assist with DCP. Discharge home with family and [facility] is planned pending delivery of vent equipment and other requested DME from [address] [place] [address]. SA[facility]. Per Mary Schwab home visit was completed today however he is waiting for written report to confirm plan. Mary Schwab said there are some questions about DME and vent settings. SAHIL explained that vent settings were provided on initial vent order signed by Dr. Corina Barajas earlier this week. Mary Schwab said he would continue to work on case and update CM. Mary Schwab aware that pt has been cleared for discharge and transfer home is pending DME delivery. CM will follow.

## 2023-09-20 NOTE — WOUND OSTOMY CARE
Progress Note - Wound   Elliott Harness 41 y.o. male MRN: [id number]  Unit/Bed#: ICU 01 Encounter: [id number]    Assessment:   This is a follow up visit for this 41year old male patient admitted on 8/24/23 in acute respiratory failure d/t right lower lobe pulmonary embolus. He has a history of HTN, left ventricular hypertrophy, testicular cancer s/p L radical orchiectomy 12/2022, obesity and umbilical hernia. Patient sitting in reclining chair with tracheostomy in place - he is for possible discharge to home today with home health services in place. As per Joseph Silva RN, [facility] and there are no questions regarding wound care orders. If patient is not discharged today, Wound Care will follow along with patient weekly, please call or tiger text with questions and concerns. Recommendations written as orders.   Diane Hare MSN, RN, Tracey Kent

## 2023-09-20 NOTE — UTILIZATION REVIEW
Continued Stay Review    Date: 9/20/23                          Current Patient Class: inpatient  Current Level of Care: ICU  HPI:38 y.o. male initially admitted on 8/24/23     Assessment/Plan:   Continues on trach collar at 28% FiO2. Patient does require Q4 suctioning, + strong cough but unable to fully clear secretions without suctioning. Trach & PEG care continued.  Tolerating tube feedings    Vital Signs:   09/20/23 1900 -- 78 25 Abnormal  -- -- 93 % -- -- -- -- -- --   09/20/23 1800 -- 66 28 Abnormal  108/74 87 88 % Abnormal  -- -- -- -- -- --   09/20/23 1700 -- 70 21 -- -- 96 % -- -- -- -- -- --   09/20/23 1600 -- 60 28 Abnormal  119/77 93 96 % -- -- -- -- -- --   09/20/23 1500 -- 62 25 Abnormal  -- -- 90 % -- -- -- -- -- --   09/20/23 1400 -- 62 28 Abnormal  119/73 92 91 % -- -- -- -- -- --   09/20/23 1326 -- -- -- -- -- 95 % -- -- -- -- -- --   09/20/23 1316 -- -- -- -- -- 93 % 28 44 6 L/min 6 L/min Trach mask --       Pertinent Labs/Diagnostic Results:       Results from last 7 days   Lab Units 09/20/23  0518 09/17/23  0640   WBC Thousand/uL 9.55 8.05   HEMOGLOBIN g/dL 11.0* 11.4*   HEMATOCRIT % 34.8* 35.0*   PLATELETS Thousands/uL 239 213   NEUTROS ABS Thousands/µL  --  5.47     Results from last 7 days   Lab Units 09/20/23  0518 09/17/23  0640 09/14/23  0500   SODIUM mmol/L 140 138 140   POTASSIUM mmol/L 3.7 3.8 4.3   CHLORIDE mmol/L 100 101 105   CO2 mmol/L 36* 33* 32   ANION GAP mmol/L 4 4 3   BUN mg/dL 16 17 16   CREATININE mg/dL 0.71 0.66 0.57*   EGFR ml/min/1.73sq m 120 124 132   CALCIUM mg/dL 9.1 8.7 8.8   MAGNESIUM mg/dL 2.3 2.1 2.2   PHOSPHORUS mg/dL  --   --  3.4     Results from last 7 days   Lab Units 09/20/23  0518 09/17/23  0640 09/14/23  0500   GLUCOSE RANDOM mg/dL 96 124 121     Results from last 7 days   Lab Units 09/16/23  1349   PH ART  7.443   PCO2 ART mm Hg 47.8*   PO2 ART mm Hg 85.7   HCO3 ART mmol/L 32.0*   BASE EXC ART mmol/L 6.8   O2 CONTENT ART mL/dL 17.7   O2 HGB, ARTERIAL % 95.5 ABG SOURCE  Radial, Right     Medications:   Scheduled Medications:  apixaban, 5 mg, Oral, BID  chlorhexidine, 15 mL, Mouth/Throat, Q12H ISAEL  cholecalciferol, 1,000 Units, Oral, Daily  FLUoxetine, 10 mg, Oral, Daily  thiamine, 100 mg, Oral, Daily    PRN Meds:  acetaminophen, 650 mg, Oral, Q4H PRN  albuterol, 2.5 mg, Nebulization, Q6H PRN  polyethylene glycol, 17 g, Oral, Daily PRN  senna, 17.6 mg, Oral, Daily PRN      Discharge Plan: [facility]    Network Utilization Review Department  ATTENTION: Please call with any questions or concerns to [phone] and carefully listen to the prompts so that you are directed to the right person. All voicemails are confidential.  Abel Gomez all requests for admission clinical reviews, approved or denied determinations and any other requests to dedicated fax number below belonging to the campus where the patient is receiving treatment.  List of dedicated fax numbers for the Facilities:  [place] DENIALS (Administrative/Medical Necessity) [phone] 2303 [facility] (Maternity/NICU/Pediatrics) [phone]   [address] [place] [address] Drive [phone]   [facility] [address] [facility] [phone] [address] [place] [address] [address] [phone]   [address] [address] [phone]

## 2023-09-20 NOTE — PROGRESS NOTES
[address]  Progress Note  Name: Yuliya Gordon  MRN: [id number]  Unit/Bed#: ICU 01 I Date of Admission: 8/24/2023   Date of Service: 9/20/2023 I Hospital Day: 27    Assessment/Plan   * Acute respiratory failure with hypoxia [facility])  Assessment & Plan  · 8/12-8/13: intubated for airway protection when acutely had GCS of 5 at [facility]  · 8/23 CTA PE study: PE in the right lower lobe segmental pulmonary artery  · 8/24 RRT > intubated for hypoxia + airway protection   · 8/25: Bronchoscopy: poorly tolerated secondary to hypoxia. Some mucus plugging present on the left. · 8/26: CT chest: Complete right lower lobe and near complete left lower lobe atelectasis + edema  · 8/31: Completed Zosyn x 7 days   · 9/8: Jenny Macadam placed    Plan:  · Patient has been on 28%  trach collar since 9/15  · Patient does require Q4 suctioning, + strong cough but unable to fully clear secretions without suctioning  · Home vent delivered, pending home electrical check by vent company today  · Plan for d/c home today  · Follow up with Dr. Bryce Ku (pulm) outpatient for f/u with home vent/trach management    Neuromuscular weakness [facility])  Assessment & Plan  Patient initially presented to [facility] on 8/23 with hypoxia and encephalopathy. Acute change in GCS prompted rapid response and subsequent intubation for airway protection. Of note, pt w/ recent hospitalization to [facility] 8/11-8/17 for encephalopathy with unclear etiology despite extensive workup but Wernicke's encephalopathy was on the differential.    ·  [place] workup 8/11:  · Rapid response and intubation on 8/12 for airway protection, extubated on 8/13  · CT head 8/11: No acute intracranial abnormality. Stable small bilateral subcortical hypoattenuating foci. No associated mass effect. · MRI 8/12: No acute intracranial abnormality. Stable nonspecific enhancement along the anterior floor of the third ventricle/hypothalamus compared to March 2023 study.  Findings below:  · VEEG 8/12: negative for seizure activity. Possible findings relating to underlying sleep disorder   · LP: Grossly negative overall  · DSPO: Treated with high dose thiamine. Psych evaluated, possible bipolar disorder. Discharged to home with his sister with visiting nurses, ST/PT/OT  · ASHLEY Arlen 8/23: Presented with hypoxia found by visiting nurse SPO2 in 80's  · 8/24: RRT > intubated for airway protection   · Transferred to [facility] on 8/24  · ASHLEY Arroyo 8/24 (Current hospital course)  · CT head 8/24: No acute intracranial abnormality  · 8/25 EEG: No evidence of electrographic seizures. Mild background slowing suggestive of underlying cerebral dysfunction from toxic/metablic/infectious/hypoxic encephalopathy. Clinical correlation is recommended  · Ach binding and blocking Ab negative   · MUSK Ab and ACHR modulating Ab neg  · Serum anti-TPO and DARIN Ab's, RPR, neg. CSF paraneoplastic panel neg  · Varicella PCR neg  · Toxoplasma antibodies negative  · Rheum labs negative  · 9/6 repeat LP cultures negative   · 9/7 Completed 7 days of Ceftriaxone to cover for possible neurosyphillis  · MRI 9/9: No acute intracranial pathology. Unremarkable MRI of the orbits. Improving enhancement of the hypothalamus. 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   · 9/10 completed 3 days pulse dose steroids  · 9/11 completed 5 days IVIG  · VGCC serum test pending  · 9/15 EMG revealed no acute motor neuron disease     Plan:  · F/u outpatient with neurology in 6-8 weeks ; no clear diagnosis at this time  · PT/OT, discuss with case management regarding home services    Acute pulmonary embolism without acute cor pulmonale (HCC)  Assessment & Plan  · Continue Eliquis     Status post tracheostomy [facility])  Assessment & Plan  · Trach care  · 9/22 needs remaining trach sutures removed, per surgery okay to be done by home RN    S/P percutaneous endoscopic gastrostomy (PEG) tube placement [facility])  Assessment & Plan  · Continue tube feeds at goal     History of LVH (left ventricular hypertrophy)  Assessment & Plan  Previous echocardiogram 6/23 showed EF 71%, normal diastolic dysfunction, mild left ventricular hypertrophy, mild RVSP elevation and mild TR  · PTA Med: Torsemide 20 mg daily  · Evaluated by Heart Failure team in July 2023. LVH thought to be secondary to hypertension, obesity, hx of meth use  · Echo this admission shows normal EF of 60%, normal LV thickness, no diastolic dysfunction  · Remains off torsemide     Depression  Assessment & Plan  · Fluoxetine 10 mg daily    Seminoma of left testis [facility])  Assessment & Plan  · Testicular cancer s/p left orchiectomy on 12/2022  · Repeat CT scan in January 2023 with enlarging lymph node. · Began Chemo in March 2023: Etoposide and Cisplatin with plan for 4 cycles, 5 days every 21 days  · Did not complete chemo treatment due to adverse effects  · After 3rd cycle reported double vision, labile affect and right sided weakness and therefore chemotherapy was stopped on 5/26/23  · Continue outpatient follow up with Heme/Onc     Umbilical hernia without obstruction and without gangrene  Assessment & Plan  · Umbilical hernia present, easily reducible            ICU Core Measures     Vented Patient  VAP Bundle  VAP bundle ordered     A: Assess, Prevent, and Manage Pain · Has pain been assessed? Yes  · Need for changes to pain regimen? No   B: Both Spontaneous Awakening Trials (SATs) and Spontaneous Breathing Trials (SBTs) · Plan to perform spontaneous awakening trial today? N/A   · Plan to perform spontaneous breathing trial today? N/A   · Obvious barriers to extubation? NA   C: Choice of Sedation · RASS Goal: N/A patient not on sedation  · Need for changes to sedation or analgesia regimen? NA   D: Delirium · CAM-ICU: Negative   E: Early Mobility  · Plan for early mobility? Yes   F: Family Engagement · Plan for family engagement today?  Yes       Review of Invasive Devices:      Central access plan: unaccessed port      Prophylaxis:  VTE VTE covered by:  apixaban, Oral, 5 mg at 09/19/23 1733       Stress Ulcer  not ordered       Subjective   HPI/24hr events: Was not discharged yesterday, as home [employer] needs to do home check/electrical clearance prior to discharge. Plan for that to be done today and tentative plan for discharge to home after. Review of Systems   All other systems reviewed and are negative. Objective                            Vitals I/O      Most Recent Min/Max in 24hrs   Temp 97.8 °F (36.6 °C) Temp  Min: 97.5 °F (36.4 °C)  Max: 98.8 °F (37.1 °C)   Pulse 75 Pulse  Min: 67  Max: 86   Resp (!) 25 Resp  Min: 21  Max: 39   /65 BP  Min: 99/63  Max: 143/85   O2 Sat 97 % SpO2  Min: 93 %  Max: 100 %      Intake/Output Summary (Last 24 hours) at 9/20/2023 0331  Last data filed at 9/20/2023 0200  Gross per 24 hour   Intake 3015 ml   Output 75 ml   Net 2940 ml         Diet Enteral/Parenteral; Tube Feeding No Oral Diet; Jevity 1.5; Continuous; 70; 200; Every 4 hours     Invasive Monitoring Physical exam   N/A Physical Exam  Eyes:      General: Lids are normal.      Extraocular Movements: Extraocular movements intact. Conjunctiva/sclera: Conjunctivae normal.      Pupils: Pupils are equal, round, and reactive to light. Skin:     General: Skin is warm and dry. Capillary Refill: Capillary refill takes less than 2 seconds. HENT:      Head: Normocephalic and atraumatic. Neck:     Trachea: Tracheostomy present. Comments: Favors having neck turned toward left Cardiovascular:      Rate and Rhythm: Normal rate and regular rhythm. Pulses: Normal pulses. Radial pulses are 2+ on the right side and 2+ on the left side. Dorsalis pedis pulses are 2+ on the right side and 2+ on the left side.       Heart sounds: Normal heart sounds, S1 normal and S2 normal.   Musculoskeletal:      Cervical back: Rigidity present. Comments: Generalized weakness, HINES   Abdominal:      General: Bowel sounds are normal.      Palpations: Abdomen is soft. Comments: PEG tube in place, erythema and small amount of yellow drainage surrounding site    Constitutional:       Appearance: Normal appearance. Pulmonary:      Effort: Pulmonary effort is normal.      Breath sounds: Examination of the right-upper field reveals rhonchi. Examination of the left-upper field reveals rhonchi. Examination of the right-lower field reveals decreased breath sounds. Examination of the left-lower field reveals decreased breath sounds. Decreased breath sounds and rhonchi present. Comments: Thick tan secretions from trach   Psychiatric:         Attention and Perception: Attention normal.         Mood and Affect: Mood and affect normal.         Behavior: Behavior normal. Behavior is cooperative. Judgment: Judgment normal.   Neurological:      General: No focal deficit present. Mental Status: He is easily aroused. He is lethargic. GCS: GCS eye subscore is 4. GCS verbal subscore is 1. GCS motor subscore is 6.             Diagnostic Studies      No new imaging      Medications:  Scheduled PRN   albuterol, 2.5 mg, Q6H  apixaban, 5 mg, BID  chlorhexidine, 15 mL, Q12H ISAEL  cholecalciferol, 1,000 Units, Daily  FLUoxetine, 10 mg, Daily  thiamine, 100 mg, Daily      acetaminophen, 650 mg, Q4H PRN  polyethylene glycol, 17 g, Daily PRN  senna, 17.6 mg, Daily PRN       Continuous          Labs:    CBC    No recent results  BMP    No recent results    Coags    No recent results     Additional Electrolytes  No recent results       Blood Gas    No recent results  No recent results LFTs  No recent results    Infectious  No recent results  Glucose  No recent results            Alberteen Boxer, CRNP

## 2023-09-20 NOTE — ASSESSMENT & PLAN NOTE
Previous echocardiogram 6/23 showed EF 32%, normal diastolic dysfunction, mild left ventricular hypertrophy, mild RVSP elevation and mild TR  · PTA Med: Torsemide 20 mg daily  · Evaluated by Heart Failure team in July 2023.  LVH thought to be secondary to hypertension, obesity, hx of meth use  · Echo this admission shows normal EF of 60%, normal LV thickness, no diastolic dysfunction  · Remains off torsemide

## 2023-09-20 NOTE — ASSESSMENT & PLAN NOTE
Patient initially presented to [facility] on 8/23 with hypoxia and encephalopathy. Acute change in GCS prompted rapid response and subsequent intubation for airway protection. Of note, pt w/ recent hospitalization to [facility] 8/11-8/17 for encephalopathy with unclear etiology despite extensive workup but Wernicke's encephalopathy was on the differential.    ·  [place] workup 8/11:  · Rapid response and intubation on 8/12 for airway protection, extubated on 8/13  · CT head 8/11: No acute intracranial abnormality. Stable small bilateral subcortical hypoattenuating foci. No associated mass effect. · MRI 8/12: No acute intracranial abnormality. Stable nonspecific enhancement along the anterior floor of the third ventricle/hypothalamus compared to March 2023 study. Findings below:  · VEEG 8/12: negative for seizure activity. Possible findings relating to underlying sleep disorder   · LP: Grossly negative overall  · DSPO: Treated with high dose thiamine. Psych evaluated, possible bipolar disorder. Discharged to home with his sister with visiting nurses, ST/PT/OT  · [facility]: Presented with hypoxia found by visiting nurse SPO2 in 80's  · 8/24: RRT > intubated for airway protection   · Transferred to [facility] on 8/24  · [facility] 8/24 (Current hospital course)  · CT head 8/24: No acute intracranial abnormality  · 8/25 EEG: No evidence of electrographic seizures. Mild background slowing suggestive of underlying cerebral dysfunction from toxic/metablic/infectious/hypoxic encephalopathy. Clinical correlation is recommended  · Ach binding and blocking Ab negative   · MUSK Ab and ACHR modulating Ab neg  · Serum anti-TPO and DARIN Ab's, RPR, neg. CSF paraneoplastic panel neg  · Varicella PCR neg  · Toxoplasma antibodies negative  · Rheum labs negative  · 9/6 repeat LP cultures negative   · 9/7 Completed 7 days of Ceftriaxone to cover for possible neurosyphillis  · MRI 9/9: No acute intracranial pathology.  Unremarkable MRI of the orbits. Improving enhancement of the hypothalamus. 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   · 9/10 completed 3 days pulse dose steroids  · 9/11 completed 5 days IVIG  · VGCC serum test pending  · 9/15 EMG revealed no acute motor neuron disease     Plan:  · F/u outpatient with neurology in 6-8 weeks ; no clear diagnosis at this time  · PT/OT, discuss with case management regarding home services

## 2023-09-21 LAB
ALBUMIN SERPL ELPH-MCNC: 3.13 G/DL (ref 3.2–5.1)
ALBUMIN SERPL ELPH-MCNC: 41.7 % (ref 48–70)
ALBUMIN UR ELPH-MCNC: 16.1 %
ALPHA1 GLOB MFR UR ELPH: 15.1 %
ALPHA1 GLOB SERPL ELPH-MCNC: 0.44 G/DL (ref 0.15–0.47)
ALPHA1 GLOB SERPL ELPH-MCNC: 5.8 % (ref 1.8–7)
ALPHA2 GLOB MFR UR ELPH: 26.1 %
ALPHA2 GLOB SERPL ELPH-MCNC: 0.95 G/DL (ref 0.42–1.04)
ALPHA2 GLOB SERPL ELPH-MCNC: 12.7 % (ref 5.9–14.9)
ARTERIAL PATENCY WRIST A: YES
B-GLOBULIN MFR UR ELPH: 9.3 %
BASE EXCESS BLDA CALC-SCNC: 8.2 MMOL/L
BETA GLOB ABNORMAL SERPL ELPH-MCNC: 0.41 G/DL (ref 0.31–0.57)
BETA1 GLOB SERPL ELPH-MCNC: 5.4 % (ref 4.7–7.7)
BETA2 GLOB SERPL ELPH-MCNC: 7.1 % (ref 3.1–7.9)
BETA2+GAMMA GLOB SERPL ELPH-MCNC: 0.53 G/DL (ref 0.2–0.58)
BODY TEMPERATURE: 97.9 DEGREES FEHRENHEIT
GAMMA GLOB ABNORMAL SERPL ELPH-MCNC: 2.05 G/DL (ref 0.4–1.66)
GAMMA GLOB MFR UR ELPH: 33.4 %
GAMMA GLOB SERPL ELPH-MCNC: 27.3 % (ref 6.9–22.3)
HCO3 BLDA-SCNC: 34.3 MMOL/L (ref 22–28)
IGG/ALB SER: 0.72 {RATIO} (ref 1.1–1.8)
INTERPRETATION UR IFE-IMP: NORMAL
INTERPRETATION UR IFE-IMP: NORMAL
O2 CT BLDA-SCNC: 15.8 ML/DL (ref 16–23)
OXYHGB MFR BLDA: 93.6 % (ref 94–97)
PCO2 BLDA: 54.9 MM HG (ref 36–44)
PCO2 TEMP ADJ BLDA: 53.9 MM HG (ref 36–44)
PH BLD: 7.42 [PH] (ref 7.35–7.45)
PH BLDA: 7.41 [PH] (ref 7.35–7.45)
PO2 BLD: 70.5 MM HG (ref 75–129)
PO2 BLDA: 72.4 MM HG (ref 75–129)
PROT PATTERN SERPL ELPH-IMP: ABNORMAL
PROT PATTERN UR ELPH-IMP: NORMAL
PROT SERPL-MCNC: 7.5 G/DL (ref 6.4–8.4)
PROT UR-MCNC: 20.1 MG/DL
SPECIMEN SOURCE: ABNORMAL

## 2023-09-21 PROCEDURE — 82805 BLOOD GASES W/O2 SATURATION: CPT | Performed by: NURSE PRACTITIONER

## 2023-09-21 PROCEDURE — 86334 IMMUNOFIX E-PHORESIS SERUM: CPT | Performed by: STUDENT IN AN ORGANIZED HEALTH CARE EDUCATION/TRAINING PROGRAM

## 2023-09-21 PROCEDURE — 5A1945Z RESPIRATORY VENTILATION, 24-96 CONSECUTIVE HOURS: ICD-10-PCS | Performed by: ANESTHESIOLOGY

## 2023-09-21 PROCEDURE — 86335 IMMUNFIX E-PHORSIS/URINE/CSF: CPT | Performed by: STUDENT IN AN ORGANIZED HEALTH CARE EDUCATION/TRAINING PROGRAM

## 2023-09-21 PROCEDURE — 99232 SBSQ HOSP IP/OBS MODERATE 35: CPT | Performed by: ANESTHESIOLOGY

## 2023-09-21 PROCEDURE — 84166 PROTEIN E-PHORESIS/URINE/CSF: CPT | Performed by: STUDENT IN AN ORGANIZED HEALTH CARE EDUCATION/TRAINING PROGRAM

## 2023-09-21 PROCEDURE — 84165 PROTEIN E-PHORESIS SERUM: CPT | Performed by: STUDENT IN AN ORGANIZED HEALTH CARE EDUCATION/TRAINING PROGRAM

## 2023-09-21 PROCEDURE — 94668 MNPJ CHEST WALL SBSQ: CPT

## 2023-09-21 PROCEDURE — 94760 N-INVAS EAR/PLS OXIMETRY 1: CPT

## 2023-09-21 PROCEDURE — 94669 MECHANICAL CHEST WALL OSCILL: CPT

## 2023-09-21 PROCEDURE — 94002 VENT MGMT INPAT INIT DAY: CPT

## 2023-09-21 PROCEDURE — 94761 N-INVAS EAR/PLS OXIMETRY MLT: CPT

## 2023-09-21 RX ADMIN — THIAMINE HCL TAB 100 MG 100 MG: 100 TAB at 09:03

## 2023-09-21 RX ADMIN — APIXABAN 5 MG: 5 TABLET, FILM COATED ORAL at 09:03

## 2023-09-21 RX ADMIN — APIXABAN 5 MG: 5 TABLET, FILM COATED ORAL at 18:23

## 2023-09-21 RX ADMIN — CHLORHEXIDINE GLUCONATE 15 ML: 1.2 RINSE ORAL at 20:40

## 2023-09-21 RX ADMIN — FLUOXETINE 10 MG: 10 CAPSULE ORAL at 09:03

## 2023-09-21 RX ADMIN — Medication 1000 UNITS: at 09:03

## 2023-09-21 RX ADMIN — CHLORHEXIDINE GLUCONATE 15 ML: 1.2 RINSE ORAL at 09:02

## 2023-09-21 NOTE — ASSESSMENT & PLAN NOTE
Patient initially presented to [facility] on 8/23 with hypoxia and encephalopathy. Acute change in GCS prompted rapid response and subsequent intubation for airway protection. Of note, pt w/ recent hospitalization to [facility] 8/11-8/17 for encephalopathy with unclear etiology despite extensive workup but Wernicke's encephalopathy was on the differential.    ·  [place] workup 8/11:  · Rapid response and intubation on 8/12 for airway protection, extubated on 8/13  · CT head 8/11: No acute intracranial abnormality. Stable small bilateral subcortical hypoattenuating foci. No associated mass effect. · MRI 8/12: No acute intracranial abnormality. Stable nonspecific enhancement along the anterior floor of the third ventricle/hypothalamus compared to March 2023 study. Findings below:  · VEEG 8/12: negative for seizure activity. Possible findings relating to underlying sleep disorder   · LP: Grossly negative overall  · DSPO: Treated with high dose thiamine. Psych evaluated, possible bipolar disorder. Discharged to home with his sister with visiting nurses, ST/PT/OT  · [facility] 8/23: Presented with hypoxia found by visiting nurse SPO2 in 80's  · 8/24: RRT > intubated for airway protection   · Transferred to [facility] on 8/24  ·  Savita Pelletier 8/24 (Current hospital course)  · CT head 8/24: No acute intracranial abnormality  · 8/25 EEG: No evidence of electrographic seizures. Mild background slowing suggestive of underlying cerebral dysfunction from toxic/metablic/infectious/hypoxic encephalopathy. Clinical correlation is recommended  · Ach binding and blocking Ab negative   · MUSK Ab and ACHR modulating Ab neg  · Serum anti-TPO and DARIN Ab's, RPR, neg. CSF paraneoplastic panel neg  · Varicella PCR neg  · Toxoplasma antibodies negative  · Rheum labs negative  · 9/6 repeat LP cultures negative   · 9/7 Completed 7 days of Ceftriaxone to cover for possible neurosyphillis  · MRI 9/9: No acute intracranial pathology.  Unremarkable MRI of the orbits. Improving enhancement of the hypothalamus. 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   · 9/10 completed 3 days pulse dose steroids  · 9/11 completed 5 days IVIG  · 9/15 EMG revealed no acute motor neuron disease     Plan:  · F/u outpatient with neurology in 4 weeks ; no clear diagnosis at this time  · PT/OT

## 2023-09-21 NOTE — ASSESSMENT & PLAN NOTE
· 8/12-8/13: intubated for airway protection when acutely had GCS of 5 at [facility]  · 8/23 CTA PE study: PE in the right lower lobe segmental pulmonary artery  · 8/24 RRT > intubated for hypoxia + airway protection   · 8/25: Bronch: Some mucus plugging present on the left.   · 8/26: CT chest: Complete right lower lobe and near complete left lower lobe atelectasis + edema  · 8/31: Completed Zosyn x 7 days   · 9/8: Savage Dauphin placed    Plan:  · Patient has been on trach collar since 9/15  · Patient does require Q4 suctioning, + strong cough but unable to fully clear secretions without suctioning  · Home vent delivered, pending home electrical check by vent company   · Plan for d/c home pending equipment + checks  · Follow up with Dr. Jazlyn Block (pul) outpatient for f/u with home vent/trach management

## 2023-09-21 NOTE — ASSESSMENT & PLAN NOTE
Previous echocardiogram 6/23 showed EF 38%, normal diastolic dysfunction, mild left ventricular hypertrophy, mild RVSP elevation and mild TR  · PTA Med: Torsemide 20 mg daily  · Evaluated by Heart Failure team in July 2023.  LVH thought to be secondary to hypertension, obesity, hx of meth use  · Echo this admission shows normal EF of 60%, normal LV thickness, no diastolic dysfunction  · Remains off torsemide

## 2023-09-21 NOTE — CASE MANAGEMENT
Case Management Discharge Planning Note    Patient name Thania Conley  Location ICU /ICU 01 MRN [id number]  : 1985 Date 2023       Current Admission Date: 2023  Current Admission Diagnosis:Acute respiratory failure with hypoxia [facility])   Patient Active Problem List    Diagnosis Date Noted   • Motor neuron disease [facility])    • Status post tracheostomy ([address]) 2023   • S/P percutaneous endoscopic gastrostomy (PEG) tube placement ([address]) 2023   • Anxiety 2023   • Acute respiratory failure with hypoxia ([address]) 2023   • Acute pulmonary embolism without acute cor pulmonale ([address]) 2023   • History of Wernicke's encephalopathy 2023   • Depression 2023   • Neuromuscular weakness ([address]) 2023   • History of LVH (left ventricular hypertrophy) 2023   • Pure hypertriglyceridemia 2023   • Encephalopathy 2023   • Unilateral vestibular schwannoma ([address]) 2023   • Port-A-Cath in place 2023   • Diplopia 2023   • 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   • Urinary hesitancy    • Umbilical hernia without obstruction and without gangrene 12/10/2022   • Tobacco use 12/10/2022   • Retroperitoneal lymphadenopathy 12/10/2022      LOS (days): 28  Geometric Mean LOS (GMLOS) (days):   Days to GMLOS:     OBJECTIVE:  Risk of Unplanned Readmission Score: 32.6         Current admission status: Inpatient   Preferred Pharmacy:   [facility] [address] [facility], [address] [facility] [address] [facility]  [address]  Phone: [phone] Fax: [phone]    Primary Care Provider: Janett Atkinson MD    Primary Insurance: [address]  Secondary Insurance:     DISCHARGE DETAILS:    Other Referral/Resources/Interventions Provided:  Referral Comments: CM called and spoke with patient's sister Jumana Naqvi, introduced self and role and purpose of phone call to ask about supplies being delivered.  Per Jumana Naqvi all supplies have been delivered to home and requested visiting nurse call her to schedule visit. Wilman Pratt at [employer] made aware of the same and patient's addr and [address] phone number provided.  made aware by [employer] about nursing visit for [facility] on Sat 9/23. CM called patient's sister Gayle Bingham to inform that CM will request patient's transportation for Sat 9/23 for 10 am and SILAS will be visiting around 11 am. Critical Care Advanced Practitioner Lon Arenas made aware of the same.

## 2023-09-21 NOTE — CASE MANAGEMENT
Case Management Discharge Planning Note    Patient name Yael Thornton  Location ICU 01/ICU 01 MRN [id number]  : 1985 Date 2023       Current Admission Date: 2023  Current Admission Diagnosis:Acute respiratory failure with hypoxia [facility])   Patient Active Problem List    Diagnosis Date Noted   • Motor neuron disease [facility])    • Status post tracheostomy ([address]) 2023   • S/P percutaneous endoscopic gastrostomy (PEG) tube placement ([address]) 2023   • Anxiety 2023   • Acute respiratory failure with hypoxia ([address]) 2023   • Acute pulmonary embolism without acute cor pulmonale ([address]) 2023   • History of Wernicke's encephalopathy 2023   • Depression 2023   • Neuromuscular weakness ([address]) 2023   • History of LVH (left ventricular hypertrophy) 2023   • Pure hypertriglyceridemia 2023   • Encephalopathy 2023   • Unilateral vestibular schwannoma ([address]) 2023   • Port-A-Cath in place 2023   • Diplopia 2023   • 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   • Urinary hesitancy 89/15/0087   • Umbilical hernia without obstruction and without gangrene 12/10/2022   • Tobacco use 12/10/2022   • Retroperitoneal lymphadenopathy 12/10/2022      LOS (days): 28  Geometric Mean LOS (GMLOS) (days):   Days to GMLOS:     OBJECTIVE:  Risk of Unplanned Readmission Score: 32.54         Current admission status: Inpatient   Preferred Pharmacy:   [facility] [address]  [address]  Phone: [phone] Fax: [phone]    Primary Care Provider: Michel Alexandra MD    Primary Insurance: [address]  Secondary Insurance:     DISCHARGE DETAILS:    Other Referral/Resources/Interventions Provided:  Referral Comments: CM spoke with Holly Triplett at [address] [place] [address] who informed that patient will be trialed in the hospital today with their vent to ensure he tolerates everything fine. SILAS made aware of patient's discharge tomm morning.

## 2023-09-21 NOTE — ASSESSMENT & PLAN NOTE
· Testicular cancer s/p left orchiectomy on 12/2022  · Repeat CT scan in January 2023 with enlarging lymph node.   · Began Chemo in March 2023: Etoposide and Cisplatin with plan for 4 cycles, 5 days every 21 days  · Did not complete chemo treatment due to adverse effects  · After 3rd cycle reported double vision, labile affect and right sided weakness and therefore chemotherapy was stopped on 5/26/23  · Outpatient follow up with Heme/Onc

## 2023-09-21 NOTE — ASSESSMENT & PLAN NOTE
· 8/12-8/13: intubated for airway protection when acutely had GCS of 5 at [facility]  · 8/23 CTA PE study: PE in the right lower lobe segmental pulmonary artery  · 8/24 RRT > intubated for hypoxia + airway protection   · 8/25: Bronch: Some mucus plugging present on the left.   · 8/26: CT chest: Complete right lower lobe and near complete left lower lobe atelectasis + edema  · 8/31: Completed Zosyn x 7 days   · 9/8: Aimee Ace placed    Plan:  · Trach collar during day + PS HS  · Plan for d/c home pending equipment + checks- tentative Saturday, 9/23  · Follow up with Dr. Preethi Medina (pulm) outpatient for f/u with home vent/trach management  · Continue aggressive pulmonary hygiene

## 2023-09-21 NOTE — PROGRESS NOTES
[id number] [facility]  Progress Note  Name: Furman Aschoff  MRN: [id number]  Unit/Bed#: ICU 01 I Date of Admission: 8/24/2023   Date of Service: 9/22/2023 I Hospital Day: 29    Assessment/Plan   * Acute respiratory failure with hypoxia [facility])  Assessment & Plan  · 8/12-8/13: intubated for airway protection when acutely had GCS of 5 at [facility]  · 8/23 CTA PE study: PE in the right lower lobe segmental pulmonary artery  · 8/24 RRT > intubated for hypoxia + airway protection   · 8/25: Bronch: Some mucus plugging present on the left. · 8/26: CT chest: Complete right lower lobe and near complete left lower lobe atelectasis + edema  · 8/31: Completed Zosyn x 7 days   · 9/8: Braydon Fusi placed    Plan:  · Trach collar during day + PS HS  · Plan for d/c home pending equipment + checks- tentative Saturday, 9/23  · Follow up with Dr. Guera Moody (pul) outpatient for f/u with home vent/trach management  · Continue aggressive pulmonary hygiene     Neuromuscular weakness [facility])  Assessment & Plan  Patient initially presented to [facility] on 8/[facility] with hypoxia and encephalopathy. Acute change in GCS prompted rapid response and subsequent intubation for airway protection. Of note, pt w/ recent hospitalization to [facility] 8/11-8/17 for encephalopathy with unclear etiology despite extensive workup but Wernicke's encephalopathy was on the differential.    ·  [place] workup 8/11:  · Rapid response and intubation on 8/12 for airway protection, extubated on 8/13  · CT head 8/11: No acute intracranial abnormality. Stable small bilateral subcortical hypoattenuating foci. No associated mass effect. · MRI 8/12: No acute intracranial abnormality. Stable nonspecific enhancement along the anterior floor of the third ventricle/hypothalamus compared to March 2023 study. Findings below:  · VEEG 8/12: negative for seizure activity.  Possible findings relating to underlying sleep disorder   · LP: Grossly negative overall  · DSPO: Treated with high dose thiamine. Psych evaluated, possible bipolar disorder. Discharged to home with his sister with visiting nurses, ST/PT/OT  · [facility] 8/23: Presented with hypoxia found by visiting nurse SPO2 in 80's  · 8/24: RRT > intubated for airway protection   · Transferred to [facility] on 8/24  · [facility] 8/24 (Current hospital course)  · CT head 8/24: No acute intracranial abnormality  · 8/25 EEG: No evidence of electrographic seizures. Mild background slowing suggestive of underlying cerebral dysfunction from toxic/metablic/infectious/hypoxic encephalopathy. Clinical correlation is recommended  · Ach binding and blocking Ab negative   · MUSK Ab and ACHR modulating Ab neg  · Serum anti-TPO and DARIN Ab's, RPR, neg. CSF paraneoplastic panel neg  · Varicella PCR neg  · Toxoplasma antibodies negative  · Rheum labs negative  · 9/6 repeat LP cultures negative   · 9/7 Completed 7 days of Ceftriaxone to cover for possible neurosyphillis  · MRI 9/9: No acute intracranial pathology. Unremarkable MRI of the orbits. Improving enhancement of the hypothalamus. 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   · 9/10 completed 3 days pulse dose steroids  · 9/11 completed 5 days IVIG  · 9/15 EMG revealed no acute motor neuron disease     Plan:  · F/u outpatient with neurology in 4 weeks ; no clear diagnosis at this time  · PT/OT    Acute pulmonary embolism without acute cor pulmonale (HCC)  Assessment & Plan  · Continue Eliquis     Status post tracheostomy ([address])  Assessment & Plan  · Trach care  · Remaining trach sutures need to be removed today    S/P percutaneous endoscopic gastrostomy (PEG) tube placement ([address])  Assessment & Plan  · Continue tube feeds at goal     History of LVH (left ventricular hypertrophy)  Assessment & Plan  PTA Med: Torsemide 20 mg daily  · Remains off torsemide     Depression  Assessment & Plan  · Fluoxetine 10 mg daily    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HCC)  Assessment & Plan  · Testicular cancer s/p left orchiectomy on 12/2022  · Repeat CT scan in January 2023 with enlarging lymph node. · Began Chemo in March 2023: Etoposide and Cisplatin with plan for 4 cycles, 5 days every 21 days  · Did not complete chemo treatment due to adverse effects  · After 3rd cycle reported double vision, labile affect and right sided weakness and therefore chemotherapy was stopped on 5/26/23  · Outpatient follow up with Heme/Onc     Umbilical hernia without obstruction and without gangrene  Assessment & Plan  · Umbilical hernia present, easily reducible        ICU Core Measures     Vented Patient  VAP Bundle  VAP bundle ordered     A: Assess, Prevent, and Manage Pain · Has pain been assessed? Yes  · Need for changes to pain regimen? NA   B: Both Spontaneous Awakening Trials (SATs) and Spontaneous Breathing Trials (SBTs) · Plan to perform spontaneous awakening trial today? N/A   · Plan to perform spontaneous breathing trial today? N/A   · Obvious barriers to extubation? NA   C: Choice of Sedation · RASS Goal: 0 Alert and Calm or N/A patient not on sedation  · Need for changes to sedation or analgesia regimen? NA   D: Delirium · CAM-ICU: Negative   E: Early Mobility  · Plan for early mobility? Yes   F: Family Engagement · Plan for family engagement today? Yes       Review of Invasive Devices:      Central access plan: n/a      Prophylaxis:  VTE VTE covered by:  apixaban, Oral, 5 mg at 09/21/23 1823       Stress Ulcer  not ordered       Subjective   HPI/24hr events: ABG was obtained revealing hypercapnia. Patient was placed on PS via assist control overnight. However, he had bradypnea with respiratory rate of about 6; ultimately, requiring him to be placed back on full vent settings. Review of Systems   Neurological: Positive for weakness.         Objective                            Vitals I/O      Most Recent Min/Max in 24hrs   Temp (!) 97.4 °F (36.3 °C) Temp  Min: 97.2 °F (36.2 °C)  Max: 98.5 °F (36.9 °C)   Pulse 76 Pulse  Min: 54  Max: 78   Resp 20 Resp  Min: 15  Max: 37   /66 BP  Min: 95/57  Max: 122/72   O2 Sat 96 % SpO2  Min: 94 %  Max: 99 %      Intake/Output Summary (Last 24 hours) at 9/22/2023 0103  Last data filed at 9/22/2023 0000  Gross per 24 hour   Intake 2500 ml   Output 250 ml   Net 2250 ml         Diet Enteral/Parenteral; Tube Feeding No Oral Diet; Jevity 1.5; Continuous; 70; 200; Every 4 hours     Invasive Monitoring Physical exam    Physical Exam  Eyes:      Pupils: Pupils are equal, round, and reactive to light. HENT:      Mouth/Throat:      Mouth: Mucous membranes are moist.   Neck:     Trachea: Tracheostomy present. Cardiovascular:      Rate and Rhythm: Normal rate and regular rhythm. Pulses: Normal pulses. Abdominal:      General: There is abdominal type feeding tube. Palpations: Abdomen is soft. Constitutional:       General: He is not in acute distress. Pulmonary:      Effort: No respiratory distress. Breath sounds: Rhonchi present. No wheezing or rales. Psychiatric:         Mood and Affect: Affect is flat. Neurological:      Mental Status: He is easily aroused. He is lethargic. Sensory: Sensation is intact. Motor: Weakness. Diagnostic Studies      EKG: NSR  Imaging:  I have personally reviewed pertinent reports.        Medications:  Scheduled PRN   apixaban, 5 mg, BID  chlorhexidine, 15 mL, Q12H ISAEL  cholecalciferol, 1,000 Units, Daily  FLUoxetine, 10 mg, Daily  thiamine, 100 mg, Daily      acetaminophen, 650 mg, Q4H PRN  albuterol, 2.5 mg, Q6H PRN  polyethylene glycol, 17 g, Daily PRN  senna, 17.6 mg, Daily PRN       Continuous          Labs:    CBC    Recent Labs     09/20/23  0518   WBC 9.55   HGB 11.0*   HCT 34.8*        BMP    Recent Labs     09/20/23  0518   SODIUM 140   K 3.7      CO2 36*   AGAP 4   BUN 16   CREATININE 0.71   CALCIUM 9.1       Coags    No recent results     Additional Electrolytes  Recent Labs 09/20/23  0518   MG 2.3          Blood Gas    Recent Labs     09/21/23  0943   PHART 7.414   FYF1IQV 54.9*   PO2ART 72.4*   LXO6SSH 34.3*   BEART 8.2   SOURCE Radial, Right     Recent Labs     09/21/23  0943   SOURCE Radial, Right    LFTs  No recent results    Infectious  No recent results  Glucose  Recent Labs     09/20/23  0518   GLUC 96                 AMBER Herrera

## 2023-09-21 NOTE — ASSESSMENT & PLAN NOTE
Patient initially presented to [facility] on 8/23 with hypoxia and encephalopathy. Acute change in GCS prompted rapid response and subsequent intubation for airway protection. Of note, pt w/ recent hospitalization to [facility] 8/11-8/17 for encephalopathy with unclear etiology despite extensive workup but Wernicke's encephalopathy was on the differential.    ·  [place] workup 8/11:  · Rapid response and intubation on 8/12 for airway protection, extubated on 8/13  · CT head 8/11: No acute intracranial abnormality. Stable small bilateral subcortical hypoattenuating foci. No associated mass effect. · MRI 8/12: No acute intracranial abnormality. Stable nonspecific enhancement along the anterior floor of the third ventricle/hypothalamus compared to March 2023 study. Findings below:  · VEEG 8/12: negative for seizure activity. Possible findings relating to underlying sleep disorder   · LP: Grossly negative overall  · DSPO: Treated with high dose thiamine. Psych evaluated, possible bipolar disorder. Discharged to home with his sister with visiting nurses, ST/PT/OT  ·  [place] 8/23: Presented with hypoxia found by visiting nurse SPO2 in 80's  · 8/24: RRT > intubated for airway protection   · Transferred to [facility] on 8/24  ·  [facility] 8/24 (Current hospital course)  · CT head 8/24: No acute intracranial abnormality  · 8/25 EEG: No evidence of electrographic seizures. Mild background slowing suggestive of underlying cerebral dysfunction from toxic/metablic/infectious/hypoxic encephalopathy. Clinical correlation is recommended  · Ach binding and blocking Ab negative   · MUSK Ab and ACHR modulating Ab neg  · Serum anti-TPO and DARIN Ab's, RPR, neg. CSF paraneoplastic panel neg  · Varicella PCR neg  · Toxoplasma antibodies negative  · Rheum labs negative  · 9/6 repeat LP cultures negative   · 9/7 Completed 7 days of Ceftriaxone to cover for possible neurosyphillis  · MRI 9/9: No acute intracranial pathology.  Unremarkable MRI of the orbits. Improving enhancement of the hypothalamus. 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   · 9/10 completed 3 days pulse dose steroids  · 9/11 completed 5 days IVIG  · [facility] serum test pending  · 9/15 EMG revealed no acute motor neuron disease     Plan:  · F/u outpatient with neurology in 6-8 weeks ; no clear diagnosis at this time  · PT/OT

## 2023-09-21 NOTE — QUICK NOTE
Called patient's sister, Umair Onofre, and provided an update. They are aware we will be trialing PS HS. They are currently en route to visit the patient now.

## 2023-09-21 NOTE — PROGRESS NOTES
[id number] [facility]  Progress Note  Name: Froylan Dakin  MRN: [id number]  Unit/Bed#: ICU 01 I Date of Admission: 8/24/2023   Date of Service: 9/20/2023 I Hospital Day: 27    Assessment/Plan   * Acute respiratory failure with hypoxia [facility])  Assessment & Plan  · 8/12-8/13: intubated for airway protection when acutely had GCS of 5 at [facility]  · 8/23 CTA PE study: PE in the right lower lobe segmental pulmonary artery  · 8/24 RRT > intubated for hypoxia + airway protection   · 8/25: Bronch: Some mucus plugging present on the left. · 8/26: CT chest: Complete right lower lobe and near complete left lower lobe atelectasis + edema  · 8/31: Completed Zosyn x 7 days   · 9/8: Verdia Reil placed    Plan:  · Patient has been on trach collar since 9/15  · Patient does require Q4 suctioning, + strong cough but unable to fully clear secretions without suctioning  · Home vent delivered, pending home electrical check by vent company   · Plan for d/c home pending equipment + checks  · Follow up with Dr. Mabry Or (pulm) outpatient for f/u with home vent/trach management    Neuromuscular weakness [facility])  Assessment & Plan  Patient initially presented to [facility] on 8/23 with hypoxia and encephalopathy. Acute change in GCS prompted rapid response and subsequent intubation for airway protection. Of note, pt w/ recent hospitalization to [facility] 8/11-8/17 for encephalopathy with unclear etiology despite extensive workup but Wernicke's encephalopathy was on the differential.    ·  [place] workup 8/11:  · Rapid response and intubation on 8/12 for airway protection, extubated on 8/13  · CT head 8/11: No acute intracranial abnormality. Stable small bilateral subcortical hypoattenuating foci. No associated mass effect. · MRI 8/12: No acute intracranial abnormality. Stable nonspecific enhancement along the anterior floor of the third ventricle/hypothalamus compared to March 2023 study.  Findings below:  · VEEG 8/12: negative for seizure activity. Possible findings relating to underlying sleep disorder   · LP: Grossly negative overall  · DSPO: Treated with high dose thiamine. Psych evaluated, possible bipolar disorder. Discharged to home with his sister with visiting nurses, ST/PT/OT  ·  [place] ([place] 8/23: Presented with hypoxia found by visiting nurse SPO2 in 80's  · 8/24: RRT > intubated for airway protection   · Transferred to [facility] on 8/24  · [facility] 8/24 (Current hospital course)  · CT head 8/24: No acute intracranial abnormality  · 8/25 EEG: No evidence of electrographic seizures. Mild background slowing suggestive of underlying cerebral dysfunction from toxic/metablic/infectious/hypoxic encephalopathy. Clinical correlation is recommended  · Ach binding and blocking Ab negative   · MUSK Ab and ACHR modulating Ab neg  · Serum anti-TPO and DARIN Ab's, RPR, neg. CSF paraneoplastic panel neg  · Varicella PCR neg  · Toxoplasma antibodies negative  · Rheum labs negative  · 9/6 repeat LP cultures negative   · 9/7 Completed 7 days of Ceftriaxone to cover for possible neurosyphillis  · MRI 9/9: No acute intracranial pathology. Unremarkable MRI of the orbits. Improving enhancement of the hypothalamus. 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   · 9/10 completed 3 days pulse dose steroids  · 9/11 completed 5 days IVIG  · [facility] serum test pending  · 9/15 EMG revealed no acute motor neuron disease     Plan:  · F/u outpatient with neurology in 6-8 weeks ; no clear diagnosis at this time  · PT/OT    Acute pulmonary embolism without acute cor pulmonale (HCC)  Assessment & Plan  · Continue Eliquis     Status post tracheostomy ([address])  Assessment & Plan  · Trach care  · Remaining trach sutures need to be removed, per surgery okay to be done by home RN    S/P percutaneous endoscopic gastrostomy (PEG) tube placement [facility])  Assessment & Plan  · Continue tube feeds at goal     History of LVH (left ventricular hypertrophy)  Assessment & Plan  Previous echocardiogram 6/23 showed EF 22%, normal diastolic dysfunction, mild left ventricular hypertrophy, mild RVSP elevation and mild TR  · PTA Med: Torsemide 20 mg daily  · Evaluated by Heart Failure team in July 2023. LVH thought to be secondary to hypertension, obesity, hx of meth use  · Echo this admission shows normal EF of 60%, normal LV thickness, no diastolic dysfunction  · Remains off torsemide     Depression  Assessment & Plan  · Fluoxetine 10 mg daily    Seminoma of left testis [facility])  Assessment & Plan  · Testicular cancer s/p left orchiectomy on 12/2022  · Repeat CT scan in January 2023 with enlarging lymph node. · Began Chemo in March 2023: Etoposide and Cisplatin with plan for 4 cycles, 5 days every 21 days  · Did not complete chemo treatment due to adverse effects  · After 3rd cycle reported double vision, labile affect and right sided weakness and therefore chemotherapy was stopped on 5/26/23  · Outpatient follow up with Heme/Onc     Umbilical hernia without obstruction and without gangrene  Assessment & Plan  · Umbilical hernia present, easily reducible          ICU Core Measures     Vented Patient  VAP Bundle  VAP bundle ordered     A: Assess, Prevent, and Manage Pain · Has pain been assessed? Yes  · Need for changes to pain regimen? No   B: Both Spontaneous Awakening Trials (SATs) and Spontaneous Breathing Trials (SBTs) · Plan to perform spontaneous awakening trial today? N/A   · Plan to perform spontaneous breathing trial today? N/A   · Obvious barriers to extubation? NA   C: Choice of Sedation · RASS Goal: 0 Alert and Calm or N/A patient not on sedation  · Need for changes to sedation or analgesia regimen? NA   D: Delirium · CAM-ICU: Negative   E: Early Mobility  · Plan for early mobility? Yes   F: Family Engagement · Plan for family engagement today?  Yes       Review of Invasive Devices:      Central access plan: port de-accessed      Prophylaxis:  VTE VTE covered by:  apixaban, Oral, 5 mg at 09/20/23 1728       Stress Ulcer  not ordered       Subjective   HPI/24hr events: No acute events. Patient awaiting discharge home once medical equipment and house check complete. Review of Systems   Neurological: Positive for weakness. All other systems reviewed and are negative. Objective                            Vitals I/O      Most Recent Min/Max in 24hrs   Temp 98.1 °F (36.7 °C) Temp  Min: 97 °F (36.1 °C)  Max: 98.1 °F (36.7 °C)   Pulse 76 Pulse  Min: 57  Max: 86   Resp (!) 23 Resp  Min: 17  Max: 35   /74 BP  Min: 97/68  Max: 124/76   O2 Sat 96 % SpO2  Min: 88 %  Max: 99 %      Intake/Output Summary (Last 24 hours) at 9/20/2023 2115  Last data filed at 9/20/2023 1801  Gross per 24 hour   Intake 1920 ml   Output -119 ml   Net 2039 ml         Diet Enteral/Parenteral; Tube Feeding No Oral Diet; Jevity 1.5; Continuous; 70; 200; Every 4 hours     Invasive Monitoring Physical exam    Physical Exam  Eyes:      Pupils: Pupils are equal, round, and reactive to light. Skin:     General: Skin is warm and dry. HENT:      Mouth/Throat:      Mouth: Mucous membranes are moist.   Neck:     Trachea: Tracheostomy present. Cardiovascular:      Rate and Rhythm: Normal rate and regular rhythm. Pulses: Normal pulses. Abdominal:      General: There is abdominal type feeding tube. Palpations: Abdomen is soft. Constitutional:       General: He is not in acute distress. Appearance: He is obese. He is ill-appearing. Pulmonary:      Effort: No accessory muscle usage, respiratory distress or accessory muscle usage. Breath sounds: Rhonchi present. No wheezing or rales. Psychiatric:         Mood and Affect: Affect is flat. Neurological:      Mental Status: He is oriented to person, place and time. Sensory: Sensation is intact. Motor: Weakness. Vitals reviewed.           Diagnostic Studies      EKG: NSR  Imaging:  I have personally reviewed pertinent reports.        Medications:  Scheduled PRN   apixaban, 5 mg, BID  chlorhexidine, 15 mL, Q12H [facility]  cholecalciferol, 1,000 Units, Daily  FLUoxetine, 10 mg, Daily  thiamine, 100 mg, Daily      acetaminophen, 650 mg, Q4H PRN  albuterol, 2.5 mg, Q6H PRN  polyethylene glycol, 17 g, Daily PRN  senna, 17.6 mg, Daily PRN       Continuous          Labs:    CBC    Recent Labs     09/20/23  0518   WBC 9.55   HGB 11.0*   HCT 34.8*        BMP    Recent Labs     09/20/23  0518   SODIUM 140   K 3.7      CO2 36*   AGAP 4   BUN 16   CREATININE 0.71   CALCIUM 9.1       Coags    No recent results     Additional Electrolytes  Recent Labs     09/20/23  0518   MG 2.3          Blood Gas    No recent results  No recent results LFTs  No recent results    Infectious  No recent results  Glucose  Recent Labs     09/20/23  0518   GLUC [address]

## 2023-09-22 LAB
ARTERIAL PATENCY WRIST A: YES
BASE EXCESS BLDA CALC-SCNC: 9.4 MMOL/L
BODY TEMPERATURE: 97.3 DEGREES FEHRENHEIT
HCO3 BLDA-SCNC: 35.5 MMOL/L (ref 22–28)
HOROWITZ INDEX BLDA+IHG-RTO: 28 MM[HG]
O2 CT BLDA-SCNC: 16.7 ML/DL (ref 16–23)
OXYHGB MFR BLDA: 94 % (ref 94–97)
PCO2 BLDA: 55.1 MM HG (ref 36–44)
PCO2 TEMP ADJ BLDA: 53.4 MM HG (ref 36–44)
PEEP RESPIRATORY: 6 CM[H2O]
PH BLD: 7.44 [PH] (ref 7.35–7.45)
PH BLDA: 7.43 [PH] (ref 7.35–7.45)
PO2 BLD: 74.2 MM HG (ref 75–129)
PO2 BLDA: 77.7 MM HG (ref 75–129)
SPECIMEN SOURCE: ABNORMAL
VENT AC: 14
VENT- AC: AC
VT SETTING VENT: 540 ML

## 2023-09-22 PROCEDURE — 94760 N-INVAS EAR/PLS OXIMETRY 1: CPT

## 2023-09-22 PROCEDURE — 97110 THERAPEUTIC EXERCISES: CPT

## 2023-09-22 PROCEDURE — 99232 SBSQ HOSP IP/OBS MODERATE 35: CPT | Performed by: ANESTHESIOLOGY

## 2023-09-22 PROCEDURE — 94669 MECHANICAL CHEST WALL OSCILL: CPT

## 2023-09-22 PROCEDURE — 82805 BLOOD GASES W/O2 SATURATION: CPT | Performed by: NURSE PRACTITIONER

## 2023-09-22 PROCEDURE — 94150 VITAL CAPACITY TEST: CPT

## 2023-09-22 PROCEDURE — 94668 MNPJ CHEST WALL SBSQ: CPT

## 2023-09-22 PROCEDURE — 94003 VENT MGMT INPAT SUBQ DAY: CPT

## 2023-09-22 PROCEDURE — 36600 WITHDRAWAL OF ARTERIAL BLOOD: CPT

## 2023-09-22 RX ADMIN — Medication 1000 UNITS: at 09:16

## 2023-09-22 RX ADMIN — APIXABAN 5 MG: 5 TABLET, FILM COATED ORAL at 09:16

## 2023-09-22 RX ADMIN — THIAMINE HCL TAB 100 MG 100 MG: 100 TAB at 09:16

## 2023-09-22 RX ADMIN — CHLORHEXIDINE GLUCONATE 15 ML: 1.2 RINSE ORAL at 09:16

## 2023-09-22 RX ADMIN — CHLORHEXIDINE GLUCONATE 15 ML: 1.2 RINSE ORAL at 20:13

## 2023-09-22 RX ADMIN — FLUOXETINE 10 MG: 10 CAPSULE ORAL at 09:16

## 2023-09-22 RX ADMIN — APIXABAN 5 MG: 5 TABLET, FILM COATED ORAL at 17:33

## 2023-09-22 NOTE — PROGRESS NOTES
[address]  Progress Note  Name: Aden Messina  MRN: [id number]  Unit/Bed#: ICU 01 I Date of Admission: 8/24/2023   Date of Service: 9/23/2023 I Hospital Day: 30    Assessment/Plan   * Acute respiratory failure with hypoxia [facility])  Assessment & Plan  · 8/12-8/13: intubated for airway protection when acutely had GCS of 5 at [facility]  · 8/23 CTA PE study: PE in the right lower lobe segmental pulmonary artery  · 8/24 RRT > intubated for hypoxia + airway protection   · 8/25: Bronch: Some mucus plugging present on the left. · 8/26: CT chest: Complete right lower lobe and near complete left lower lobe atelectasis + edema  · 8/31: Completed Zosyn x 7 days   · 9/8: [place] placed    Plan:  · Trach collar during day, wean FiO2 goal SpO2 >92  · ACVC 18/540/40/6 HS to avoid atelectasis, assure normocapnea  · Plan for d/c home pending equipment + checks- tentative today, 9/23  · Follow up with Dr. Ricardo Magaña (pulm) outpatient for f/u with home vent/trach management  · Continue aggressive pulmonary hygiene     Neuromuscular weakness [facility])  Assessment & Plan  Patient initially presented to [facility] on 8[facility]23 with hypoxia and encephalopathy. Acute change in GCS prompted rapid response and subsequent intubation for airway protection. Of note, pt w/ recent hospitalization to [facility] 8/11-8/17 for encephalopathy with unclear etiology despite extensive workup but Wernicke's encephalopathy was on the differential.    ·  [place] workup 8/11:  · Rapid response and intubation on 8/12 for airway protection, extubated on 8/13  · CT head 8/11: No acute intracranial abnormality. Stable small bilateral subcortical hypoattenuating foci. No associated mass effect. · MRI 8/12: No acute intracranial abnormality. Stable nonspecific enhancement along the anterior floor of the third ventricle/hypothalamus compared to March 2023 study. Findings below:  · VEEG 8/12: negative for seizure activity.  Possible findings relating to underlying sleep disorder   · LP: Grossly negative overall  · DSPO: Treated with high dose thiamine. Psych evaluated, possible bipolar disorder. Discharged to home with his sister with visiting nurses, ST/PT/OT  ·  [place] ([place]) 8/23: Presented with hypoxia found by visiting nurse SPO2 in 80's  · 8/24: RRT > intubated for airway protection   · Transferred to [address] [place] [address] on 8/24  ·  Darin Jarquin 8/24 (Current hospital course)  · CT head 8/24: No acute intracranial abnormality  · 8/25 EEG: No evidence of electrographic seizures. Mild background slowing suggestive of underlying cerebral dysfunction from toxic/metablic/infectious/hypoxic encephalopathy. Clinical correlation is recommended  · Ach binding and blocking Ab negative   · MUSK Ab and ACHR modulating Ab neg  · Serum anti-TPO and DARIN Ab's, RPR, neg. CSF paraneoplastic panel neg  · Varicella PCR neg  · Toxoplasma antibodies negative  · Rheum labs negative  · 9/6 repeat LP cultures negative   · 9/7 Completed 7 days of Ceftriaxone to cover for possible neurosyphillis  · MRI 9/9: No acute intracranial pathology. Unremarkable MRI of the orbits. Improving enhancement of the hypothalamus. 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   · 9/10 completed 3 days pulse dose steroids  · 9/11 completed 5 days IVIG  · 9/15 EMG revealed no acute motor neuron disease     Plan:  · F/u outpatient with neurology in 4 weeks ; no clear diagnosis at this time  · PT/OT    Acute pulmonary embolism without acute cor pulmonale (HCC)  Assessment & Plan  · Continue Eliquis     Status post tracheostomy ([address])  Assessment & Plan  · Trach care  · Remaining trach sutures need to be removed today- Surgery aware    S/P percutaneous endoscopic gastrostomy (PEG) tube placement ([address])  Assessment & Plan  · Continue tube feeds at goal     History of LVH (left ventricular hypertrophy)  Assessment & Plan  PTA Med: Torsemide 20 mg daily  · Remains off torsemide Depression  Assessment & Plan  · Fluoxetine 10 mg daily    Seminoma of left testis [facility])  Assessment & Plan  · Testicular cancer s/p left orchiectomy on 12/2022  · Repeat CT scan in January 2023 with enlarging lymph node. · Began Chemo in March 2023: Etoposide and Cisplatin with plan for 4 cycles, 5 days every 21 days  · Did not complete chemo treatment due to adverse effects  · After 3rd cycle reported double vision, labile affect and right sided weakness and therefore chemotherapy was stopped on 5/26/23  · Outpatient follow up with Heme/Onc     Umbilical hernia without obstruction and without gangrene  Assessment & Plan  · Umbilical hernia present, easily reducible            ICU Core Measures     Vented Patient  VAP Bundle  VAP bundle ordered     A: Assess, Prevent, and Manage Pain · Has pain been assessed? Yes  · Need for changes to pain regimen? No   B: Both Spontaneous Awakening Trials (SATs) and Spontaneous Breathing Trials (SBTs) · Plan to perform spontaneous awakening trial today? N/A   · Plan to perform spontaneous breathing trial today? Yes - has weaned successfully to trach collar in prior days. Will wean again today. · Obvious barriers to extubation? NA- Trach in place   C: Choice of Sedation · RASS Goal: N/A patient not on sedation  · Need for changes to sedation or analgesia regimen? NA   D: Delirium · CAM-ICU: Negative   E: Early Mobility  · Plan for early mobility? Yes   F: Family Engagement · Plan for family engagement today? Yes       Review of Invasive Devices:      Central access plan: SQ port not accessed      Prophylaxis:  VTE VTE covered by:  apixaban, Oral, 5 mg at 09/22/23 1733       Stress Ulcer  not ordered       Subjective    Trach in place limiting subjective data. Nods appropriately. Denies pain. HPI/24hr events: Had apneic episodes 9/21-9/22 and was placed back on AC. Tolerated trach collar yesterday without incident. Returned to full settings last evening as planned.  Had a few coughing episodes with desaturation to 80% while on 28% FiO2. FiO2 increased to 40% overnight with no further desaturation. No other changes. Review of Systems   Unable to perform ROS: Other (trach in place, nods to some questions. Denies pain. Limited ROS.)   Respiratory: Negative for shortness of breath. Objective                            Vitals I/O      Most Recent Min/Max in 24hrs   Temp (!) 97.3 °F (36.3 °C) Temp  Min: 96.8 °F (36 °C)  Max: 98.4 °F (36.9 °C)   Pulse 67 Pulse  Min: 63  Max: 82   Resp 18 Resp  Min: 16  Max: 40   /62 BP  Min: 103/62  Max: 156/96   O2 Sat 95 % SpO2  Min: 79 %  Max: 100 %      Intake/Output Summary (Last 24 hours) at 9/23/2023 0541  Last data filed at 9/23/2023 0400  Gross per 24 hour   Intake 2880 ml   Output 550 ml   Net 2330 ml         Diet Enteral/Parenteral; Tube Feeding No Oral Diet; Jevity 1.5; Continuous; 70; 200; Every 4 hours     Invasive Monitoring Physical exam   N/A Physical Exam  Skin:     General: Skin is warm and dry. HENT:      Head: Normocephalic. Mouth/Throat:      Mouth: Mucous membranes are moist.   Neck:      Comments: Tracheostomy with sutures intact. No significant erythema or drainage from site. Cardiovascular:      Rate and Rhythm: Normal rate and regular rhythm. Pulses:           Radial pulses are 2+ on the right side and 2+ on the left side. Dorsalis pedis pulses are 2+ on the right side and 2+ on the left side. Heart sounds: Normal heart sounds. Musculoskeletal:      Right lower leg: No edema. Left lower leg: No edema. Abdominal:      General: Bowel sounds are normal.      Palpations: Abdomen is soft. Comments: Easily reducible umbilical hernia. PEG in situ, no drainage from site. Pulmonary:      Effort: Tachypnea present. No accessory muscle usage, respiratory distress or accessory muscle usage. Breath sounds: Decreased breath sounds and rhonchi (intermittent, clears with cough. Producing small amounts of thick tan sputum ) present. Psychiatric:         Behavior: Behavior is cooperative. Neurological:      Mental Status: He is alert. GCS: GCS eye subscore is 4. GCS verbal subscore is 1. GCS motor subscore is 6. Motor: Weakness. Comments: Generalized weakness- 4/5 in all extremities. Able to raise and hold extremities against gravity. Genitourinary/Anorectal:     Comments: External urinary catheter patent for clear yellow urine  Vitals and nursing note reviewed.               Diagnostic Studies      EKG: NSR on monitor, rate 60s  Imaging: No new imaging in preceding 24h     Medications:  Scheduled PRN   apixaban, 5 mg, BID  chlorhexidine, 15 mL, Q12H ISAEL  cholecalciferol, 1,000 Units, Daily  FLUoxetine, 10 mg, Daily  thiamine, 100 mg, Daily      acetaminophen, 650 mg, Q4H PRN  albuterol, 2.5 mg, Q6H PRN  polyethylene glycol, 17 g, Daily PRN  senna, 17.6 mg, Daily PRN       Continuous          Labs:    CBC    No recent results  BMP    No recent results    Coags    No recent results     Additional Electrolytes  No recent results       Blood Gas    Recent Labs     09/22/23  0441   PHART 7.427   TCC1WWS 55.1*   PO2ART 77.7   [id number] 35.5*   BEART 9.4   SOURCE Radial, Right     Recent Labs     09/22/23  0441   SOURCE Radial, Right    LFTs  No recent results    Infectious  No recent results  Glucose  No recent results                Randi Necessary, CRNP

## 2023-09-22 NOTE — ASSESSMENT & PLAN NOTE
Patient initially presented to [facility] on 8/23 with hypoxia and encephalopathy. Acute change in GCS prompted rapid response and subsequent intubation for airway protection. Of note, pt w/ recent hospitalization to [facility] 8/11-8/17 for encephalopathy with unclear etiology despite extensive workup but Wernicke's encephalopathy was on the differential.    ·  [place] workup 8/11:  · Rapid response and intubation on 8/12 for airway protection, extubated on 8/13  · CT head 8/11: No acute intracranial abnormality. Stable small bilateral subcortical hypoattenuating foci. No associated mass effect. · MRI 8/12: No acute intracranial abnormality. Stable nonspecific enhancement along the anterior floor of the third ventricle/hypothalamus compared to March 2023 study. Findings below:  · VEEG 8/12: negative for seizure activity. Possible findings relating to underlying sleep disorder   · LP: Grossly negative overall  · DSPO: Treated with high dose thiamine. Psych evaluated, possible bipolar disorder. Discharged to home with his sister with visiting nurses, ST/PT/OT  ·  [place] ([place]) 8/23: Presented with hypoxia found by visiting nurse SPO2 in 80's  · 8/24: RRT > intubated for airway protection   · Transferred to [facility] on 8/24  ·  Madhavi Alcatnara 8/24 (Current hospital course)  · CT head 8/24: No acute intracranial abnormality  · 8/25 EEG: No evidence of electrographic seizures. Mild background slowing suggestive of underlying cerebral dysfunction from toxic/metablic/infectious/hypoxic encephalopathy. Clinical correlation is recommended  · Ach binding and blocking Ab negative   · MUSK Ab and ACHR modulating Ab neg  · Serum anti-TPO and DARIN Ab's, RPR, neg. CSF paraneoplastic panel neg  · Varicella PCR neg  · Toxoplasma antibodies negative  · Rheum labs negative  · 9/6 repeat LP cultures negative   · 9/7 Completed 7 days of Ceftriaxone to cover for possible neurosyphillis  · MRI 9/9: No acute intracranial pathology.  Unremarkable MRI of the orbits. Improving enhancement of the hypothalamus. 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   · 9/10 completed 3 days pulse dose steroids  · 9/11 completed 5 days IVIG  · 9/15 EMG revealed no acute motor neuron disease     Plan:  · F/u outpatient with neurology in 4 weeks ; no clear diagnosis at this time  · PT/OT

## 2023-09-22 NOTE — CASE MANAGEMENT
Case Management Discharge Planning Note    Patient name Mahsa Crews  Location ICU 01/ICU 01 MRN [id number]  : 1985 Date 2023       Current Admission Date: 2023  Current Admission Diagnosis:Acute respiratory failure with hypoxia [facility])   Patient Active Problem List    Diagnosis Date Noted   • Motor neuron disease [facility])    • Status post tracheostomy ([address]) 2023   • S/P percutaneous endoscopic gastrostomy (PEG) tube placement ([address]) 2023   • Anxiety 2023   • Acute respiratory failure with hypoxia ([address]) 2023   • Acute pulmonary embolism without acute cor pulmonale ([address]) 2023   • History of Wernicke's encephalopathy 2023   • Depression 2023   • Neuromuscular weakness ([address]) 2023   • History of LVH (left ventricular hypertrophy) 2023   • Pure hypertriglyceridemia 2023   • Encephalopathy 2023   • Unilateral vestibular schwannoma ([address]) 2023   • Port-A-Cath in place 2023   • Diplopia 2023   • 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   • Urinary hesitancy    • Umbilical hernia without obstruction and without gangrene 12/10/2022   • Tobacco use 12/10/2022   • Retroperitoneal lymphadenopathy 12/10/2022      LOS (days): 29  Geometric Mean LOS (GMLOS) (days):   Days to GMLOS:     OBJECTIVE:  Risk of Unplanned Readmission Score: 33.05         Current admission status: Inpatient   Preferred Pharmacy:   [facility] [address] [facility], [address]  Phone: [phone] Fax: [phone]    Primary Care Provider: Dashawn Bar MD    Primary Insurance: [facility]  Secondary Insurance:     DISCHARGE DETAILS:    Other Referral/Resources/Interventions Provided:  Referral Comments: Patient care discussion about discharge planning was done with Resp Manager Joel Gray who voiced concerns about patient requiring 24/ caregiver support and now needing full vent at night. CM discussed with Advanced Practitioner Lizette Zelaya, attending physician Dr. Krystle Collier and RN Claude Shaver regarding the same. Per CM [facility] meetings were held with the family and medical team about safe discharge and d/c to [facility], [facility], but family insists on taking patient home and caring for patient. As discussed with sister Tameka Kennedy requested for patient for tomm Sat 9/23 for 10 am and  confirmed by [facility] for 10 am on 9/23. Advanced practitioner Josue Winters, Dr. Shagufta Hendrix at [address] [place] [address] made aware. CM called Hilario at [address] [place] [address] to inform about patient requiring full vent at night. Vent setting changes made by Advanced Practitioner Lizette Zelaya as requested by Quentin Mackey and faxed over to [address] [place] [address]. CM called and spoke with patient's mother [place] explaining the reason for patient's discharge tomm 9/23 to ensure a safe discharge planning. Clinton Parkinson at [facility] was made aware about patient's discharge date and time and was informed by Clinton Parkinson that a nurse will visit for DEE DEE around [phone]. Treatment Team Recommendation: Short Term Rehab  Discharge Destination Plan[de-identified] Home with [facility] at Discharge : [facility]  Dispatcher Contacted: Yes  Number/Name of Dispatcher: [address] [facility] [address] by Pilar and Unit #):  MICKEY  [phone]  ETA of Transport (Date): 09/23/23  ETA of Transport (Time): 1000

## 2023-09-22 NOTE — ASSESSMENT & PLAN NOTE
· 8/12-8/13: intubated for airway protection when acutely had GCS of 5 at [facility]  · 8/23 CTA PE study: PE in the right lower lobe segmental pulmonary artery  · 8/24 RRT > intubated for hypoxia + airway protection   · 8/25: Bronch: Some mucus plugging present on the left.   · 8/26: CT chest: Complete right lower lobe and near complete left lower lobe atelectasis + edema  · 8/31: Completed Zosyn x 7 days   · 9/8: Wilberg Smiles placed    Plan:  · Trach collar during day, wean FiO2 goal SpO2 >92  · ACVC 18/540/40/6 HS to avoid atelectasis, assure normocapnea  · Plan for d/c home pending equipment + checks- tentative today, 9/23  · Follow up with Dr. Grazyna Pack (pulm) outpatient for f/u with home vent/trach management  · Continue aggressive pulmonary hygiene

## 2023-09-22 NOTE — RESPIRATORY THERAPY NOTE
Pt put on full vent support due to low rr and vt, tried increasing pressure support before switch but it did not affect the respiratory rate.

## 2023-09-22 NOTE — QUICK NOTE
Patient was placed on PS per orders. His vent kept alarming as he was bradypneic with respiratory rate of 6. His pressure support was increased without affect on his respiratory rate. Patient was ultimately placed back on full vent settings.

## 2023-09-22 NOTE — PHYSICAL THERAPY NOTE
PT TREATMENT     09/22/23 8045   PT Last Visit   PT Visit Date 09/22/23   Note Type   Note Type Treatment   Pain Assessment   Pain Assessment Tool Choi-Baker FACES   Choi-Baker FACES Pain Rating 0   Restrictions/Precautions   Other Precautions Chair Alarm; Bed Alarm; Fall Risk;O2;Multiple lines  (Patient with trach and PEG and recently returned to vent)   General   Chart Reviewed Yes   Family/Caregiver Present No   Cognition   Arousal/Participation Cooperative   Subjective   Subjective Patient indicating that he is fatigued(patient required return to full vent settings earlier today   Bed Mobility   Additional Comments Patient seen sitting out of bed in chair(nursing transfer completed with mechanical lift)   Transfers   Additional Comments Standing activity deferred this session due to fatigue and change in medical status   Exercises   Hamstring Stretch Sitting;5 reps;PROM; Bilateral   Quad Sets Sitting;10 reps;Bilateral   Heelslides Sitting;10 reps;AROM;AAROM; Bilateral   Hip Abduction Sitting;10 reps;AAROM;AROM; Bilateral   Knee AROM Short Arc Quad Sitting;10 reps;AROM; Bilateral   Knee AROM Long Arc Quad Sitting;10 reps;AAROM;AROM; Bilateral   Ankle Pumps Sitting;20 reps;Bilateral   Marching Sitting;10 reps;Bilateral   Assessment   Assessment patient cooperative although fatiqued with medical status change and new need for vent. patient will benefit from continued physical therapy with progression as tolerated and increasing functional mobility as medically able. The patient's AM-PAC Basic Mobility Inpatient Short Form Raw Score is 6. A Raw score of less than or equal to 16 suggests the patient may benefit from discharge to post-acute rehabilitation services. Please also refer to the recommendation of the Physical Therapist for safe discharge planning. Plan   Treatment/Interventions ADL retraining;Functional transfer training;LE strengthening/ROM; Therapeutic exercise; Endurance training;Patient/family training;Equipment eval/education; Bed mobility;Gait training; Compensatory technique education   PT Frequency Other (Comment)  (5 times a week)   Recommendation   PT Discharge Recommendation Post acute rehabilitation services   AM-PAC Basic Mobility Inpatient   Turning in Flat Bed Without Bedrails 1   Lying on Back to Sitting on Edge of Flat Bed Without Bedrails 1   Moving Bed to Chair 1   Standing Up From Chair Using Arms 1   Walk in Room 1   Climb 3-5 Stairs With Railing 1   Basic Mobility Inpatient Raw Score 6   Turning Head Towards Sound 4   Follow Simple Instructions 3   Low Function Basic Mobility Raw Score  13   Low Function Basic Mobility Standardized Score  20.14   Highest Level Of Mobility   JH-HLM Goal 2: Bed activities/Dependent transfer   JH-HLM Achieved 2: Bed activities/Dependent transfer   Education   Patient Demonstrates acceptance/verbal understanding;Explanation/teachback used;Demonstrates verbal understanding;Reinforcement needed   Licensure   [place] License Number  Haritha Joya, PT 4 0 QA [id number]

## 2023-09-22 NOTE — RESPIRATORY THERAPY NOTE
Pt recd on a Hook C_# vent setting as per flow sheet all alarms on and functional vent working properly

## 2023-09-23 ENCOUNTER — APPOINTMENT (INPATIENT)
Dept: RADIOLOGY | Facility: HOSPITAL | Age: 38
DRG: 005 | End: 2023-09-23
Payer: COMMERCIAL

## 2023-09-23 ENCOUNTER — HOME CARE VISIT (OUTPATIENT)
Dept: HOME HEALTH SERVICES | Facility: HOME HEALTHCARE | Age: 38
End: 2023-09-23
Payer: COMMERCIAL

## 2023-09-23 VITALS
TEMPERATURE: 97.5 F | WEIGHT: 292.55 LBS | HEIGHT: 76 IN | DIASTOLIC BLOOD PRESSURE: 64 MMHG | RESPIRATION RATE: 40 BRPM | SYSTOLIC BLOOD PRESSURE: 112 MMHG | BODY MASS INDEX: 35.63 KG/M2 | HEART RATE: 78 BPM | OXYGEN SATURATION: 99 %

## 2023-09-23 PROBLEM — J96.02 ACUTE RESPIRATORY FAILURE WITH HYPOXIA AND HYPERCAPNIA (HCC): Status: ACTIVE | Noted: 2023-08-24

## 2023-09-23 LAB
ARTERIAL PATENCY WRIST A: YES
BASE EXCESS BLDA CALC-SCNC: 9.1 MMOL/L
BODY TEMPERATURE: 97.3 DEGREES FEHRENHEIT
HCO3 BLDA-SCNC: 32.4 MMOL/L (ref 22–28)
HOROWITZ INDEX BLDA+IHG-RTO: 40 MM[HG]
MISCELLANEOUS LAB TEST RESULT: NORMAL
O2 CT BLDA-SCNC: 15.4 ML/DL (ref 16–23)
OXYHGB MFR BLDA: 94.6 % (ref 94–97)
PCO2 BLDA: 39.3 MM HG (ref 36–44)
PCO2 TEMP ADJ BLDA: 38.1 MM HG (ref 36–44)
PEEP RESPIRATORY: 6 CM[H2O]
PH BLD: 7.54 [PH] (ref 7.35–7.45)
PH BLDA: 7.53 [PH] (ref 7.35–7.45)
PO2 BLD: 70.6 MM HG (ref 75–129)
PO2 BLDA: 74 MM HG (ref 75–129)
SPECIMEN SOURCE: ABNORMAL
VENT AC: 18
VENT- AC: AC
VT SETTING VENT: 540 ML

## 2023-09-23 PROCEDURE — NC001 PR NO CHARGE: Performed by: ANESTHESIOLOGY

## 2023-09-23 PROCEDURE — G0299 HHS/HOSPICE OF RN EA 15 MIN: HCPCS

## 2023-09-23 PROCEDURE — 94669 MECHANICAL CHEST WALL OSCILL: CPT

## 2023-09-23 PROCEDURE — 36600 WITHDRAWAL OF ARTERIAL BLOOD: CPT

## 2023-09-23 PROCEDURE — 94760 N-INVAS EAR/PLS OXIMETRY 1: CPT

## 2023-09-23 PROCEDURE — 99238 HOSP IP/OBS DSCHRG MGMT 30/<: CPT | Performed by: NURSE PRACTITIONER

## 2023-09-23 PROCEDURE — 94003 VENT MGMT INPAT SUBQ DAY: CPT

## 2023-09-23 PROCEDURE — 71045 X-RAY EXAM CHEST 1 VIEW: CPT

## 2023-09-23 PROCEDURE — 82805 BLOOD GASES W/O2 SATURATION: CPT | Performed by: PHYSICIAN ASSISTANT

## 2023-09-23 RX ORDER — FLUOXETINE 10 MG/1
10 CAPSULE ORAL DAILY
Qty: 30 CAPSULE | Refills: 0 | Status: SHIPPED | OUTPATIENT
Start: 2023-09-23

## 2023-09-23 RX ORDER — SENNOSIDES 8.8 MG/5ML
17.6 LIQUID ORAL DAILY PRN
Qty: 236 ML | Refills: 0 | Status: SHIPPED | OUTPATIENT
Start: 2023-09-23

## 2023-09-23 RX ORDER — LANOLIN ALCOHOL/MO/W.PET/CERES
100 CREAM (GRAM) TOPICAL DAILY
Qty: 30 TABLET | Refills: 0 | Status: SHIPPED | OUTPATIENT
Start: 2023-09-23

## 2023-09-23 RX ORDER — MELATONIN
1000 DAILY
Qty: 30 TABLET | Refills: 0 | Status: SHIPPED | OUTPATIENT
Start: 2023-09-23

## 2023-09-23 RX ORDER — POLYETHYLENE GLYCOL 3350 17 G/17G
17 POWDER, FOR SOLUTION ORAL DAILY PRN
Qty: 10 EACH | Refills: 0 | Status: SHIPPED | OUTPATIENT
Start: 2023-09-23

## 2023-09-23 RX ORDER — ALBUTEROL SULFATE 2.5 MG/3ML
2.5 SOLUTION RESPIRATORY (INHALATION) EVERY 6 HOURS PRN
Qty: 60 ML | Refills: 0 | Status: SHIPPED | OUTPATIENT
Start: 2023-09-23 | End: 2023-09-28

## 2023-09-23 RX ORDER — ACETAMINOPHEN 160 MG/5ML
650 SUSPENSION ORAL EVERY 6 HOURS PRN
Qty: 30 ML | Refills: 0 | Status: SHIPPED | OUTPATIENT
Start: 2023-09-23 | End: 2023-09-28

## 2023-09-23 RX ADMIN — FLUOXETINE 10 MG: 10 CAPSULE ORAL at 09:36

## 2023-09-23 RX ADMIN — APIXABAN 5 MG: 5 TABLET, FILM COATED ORAL at 09:36

## 2023-09-23 RX ADMIN — Medication 1000 UNITS: at 09:36

## 2023-09-23 RX ADMIN — THIAMINE HCL TAB 100 MG 100 MG: 100 TAB at 09:36

## 2023-09-23 RX ADMIN — CHLORHEXIDINE GLUCONATE 15 ML: 1.2 RINSE ORAL at 09:36

## 2023-09-23 NOTE — ASSESSMENT & PLAN NOTE
· 8/12-8/13: intubated for airway protection when acutely had GCS of 5 at [facility]  · 8/23 CTA PE study: PE in the right lower lobe segmental pulmonary artery  · 8/24 RRT > intubated for hypoxia + airway protection   · 8/25: Bronch: Some mucus plugging present on the left.   · 8/26: CT chest: Complete right lower lobe and near complete left lower lobe atelectasis + edema  · 8/31: Completed Zosyn x 7 days   · 9/8: Erick Sida placed    Plan:  · Trach collar during day 35%, wean FiO2 goal SpO2 >92  · ACVC 18/540/40/6 HS to avoid atelectasis, assure normocapnea  · Discharge home today   · Follow up with Dr. Ozzy Rooney (pulm) outpatient for f/u with home vent/trach management  · Continue aggressive pulmonary hygiene

## 2023-09-23 NOTE — DISCHARGE INSTR - AVS FIRST PAGE
Trach collar 35% during the day  Ventilator support at night.  Vent settings: Respiratory rate-18, tidal volume-540, PEEP 6, fio2 40%

## 2023-09-23 NOTE — QUICK NOTE
Patient is being prepared for discharge by CC team. Still requiring supplemental ventilation overnights in order to assist with tidal volume and reduce hypercapnia. During daytime hours on trach collar and tolerated well. More can 2 weeks out since tracheostomy placement. Trach Sutures removed in preparation for discharge. Will continue Tracheostomy that is cuffed based on current use and application.

## 2023-09-23 NOTE — ASSESSMENT & PLAN NOTE
Patient initially presented to [facility] on 8/23 with hypoxia and encephalopathy. Acute change in GCS prompted rapid response and subsequent intubation for airway protection. Of note, pt w/ recent hospitalization to [facility] 8/11-8/17 for encephalopathy with unclear etiology despite extensive workup but Wernicke's encephalopathy was on the differential.     ·  [place] workup 8/11:  · Rapid response and intubation on 8/12 for airway protection, extubated on 8/13  · CT head 8/11: No acute intracranial abnormality. Stable small bilateral subcortical hypoattenuating foci. No associated mass effect. · MRI 8/12: No acute intracranial abnormality. Stable nonspecific enhancement along the anterior floor of the third ventricle/hypothalamus compared to March 2023 study. Findings below:  · VEEG 8/12: negative for seizure activity. Possible findings relating to underlying sleep disorder   · LP: Grossly negative overall  · DSPO: Treated with high dose thiamine. Psych evaluated, possible bipolar disorder. Discharged to home with his sister with visiting nurses, ST/PT/OT  · [facility] ([place]) 8/23: Presented with hypoxia found by visiting nurse SPO2 in 80's  · 8/24: RRT > intubated for airway protection   · Transferred to [facility] on 8/24  ·  Erin Lam 8/24 (Current hospital course)  · CT head 8/24: No acute intracranial abnormality  · 8/25 EEG: No evidence of electrographic seizures. Mild background slowing suggestive of underlying cerebral dysfunction from toxic/metablic/infectious/hypoxic encephalopathy. Clinical correlation is recommended  · Ach binding and blocking Ab negative   · MUSK Ab and ACHR modulating Ab neg  · Serum anti-TPO and DARIN Ab's, RPR, neg. CSF paraneoplastic panel neg  · Varicella PCR neg  · Toxoplasma antibodies negative  · Rheum labs negative  · 9/6 repeat LP cultures negative   · 9/7 Completed 7 days of Ceftriaxone to cover for possible neurosyphillis  · MRI 9/9: No acute intracranial pathology.  Unremarkable MRI of the orbits. Improving enhancement of the hypothalamus. 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   · 9/10 completed 3 days pulse dose steroids  · 9/11 completed 5 days IVIG  · 9/15 EMG revealed no acute motor neuron disease     Plan:  · F/u outpatient with neurology in 4 weeks ; no clear diagnosis at this time  · PT/OT

## 2023-09-23 NOTE — DISCHARGE SUMMARY
Discharge Summary - Jeramie Marquez 41 y.o. male MRN: [id number]    Unit/Bed#: ICU 01 Encounter: [id number]    Admission Date:   Admission Orders (From admission, onward)     Ordered        08/24/23 1833  Inpatient Admission  Once                        Admitting Diagnosis: Acute pulmonary embolism without acute cor pulmonale (HCC)    HPI: Per Emmy CLARK H&P 8/24 2000: "41 y.o. 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 CTA PE demonstrated right lower lobe segmental PE without evidence of RV strain. He was initiated on heparin infusion. Oxygenation was adequate on nasal cannula and he was admitted to the floor for further monitoring.      Today, a rapid response was called for hypoxia and altered mental status. Chart review notes that the patient's eyes were rolled back with sputum present in the corners of his mouth. No seizure like activity witnessed. He was then emergently intubated for airway protection and hypoxia. VBG (?) collected post intubation demonstrated hypercarbia: 7.25/76/59/33. Once intubated, he continued to have episodes of desaturation requiring high ventilator settings. CT head was negative for acute intracranial abnormalities. He was sedated with propofol but transitioned to fentanyl secondary to hypotension. Levophed was started. He was then transferred to [facility] for further higher level of care. On arrival, he was agitated but was able to follow commands with all extremities. With agitation, his sat's dropped into the 60-70s. Thick, large amount of secretions suctioned via ETT. Initial vent settings AC/VC 16x650, 100, 10. TV reduced to 550 per ARDs net protocol to achieve approx 6mL/kg.  CXR concerning for possible left lower lobe infiltrate. Sepsis workup initiated.      Infusions running on arrival: heparin gtt, levophed at 5, fentanyl 50mcg/hr.      On chart review, outpatient Oncology notes report that chemotherapy was discontinued earlier this year secondary to new double vision, right sided weakness and labile affect. He had multiple falls requiring admission to [facility] for trauma workup 7/28-8/5. During that hospitalization, notes state patient was minimally verbal and family was reporting that he had been that way for several months. He was discharged to [facility] rehab in the beginning of August. He then had another hospitalization at [facility] (8/11-17) due to worsening somnolence while at rehab. He also required intubation for approximately 24 hours for low GCS during that stay. He had an extensive neurologic workup including CT head which was unremarkable, MRI showing enhancement of the anterior floor of the third ventricle, hypothalamus, mamillary bodies which was stable from previous. LP negative for meningitis/encephalitis. Autoimmune panel and paraneoplastic CSFstudies still pending. cEEG negative for seizures. He was treated with high dose thiamine for possible Wernicki's encephalpathy. Mentation improved on admission and he was discharged home (with his sister) on 8/17 with visiting nurses, PT/OT and speech therapy.      Patient's sister reports that Zana Eisenmenger continued to be encephalopathic after discharge but that his mental status would wax and wane. She said that he often said things that did not make sense. He required assistance for most ADLs. She said he had become incontinent and was requiring briefs. He was minimally ambulatory prior to arrival."        Procedures Performed: No orders of the defined types were placed in this encounter.     Summary of Hospital Course:     * Acute respiratory failure with hypoxia and hypercapnia ([address])  Assessment & Plan  · 8/12-8/13: intubated for airway protection when acutely had GCS of 5 at [facility]  · 8/23 CTA PE study: PE in the right lower lobe segmental pulmonary artery  · 8/24 RRT > intubated for hypoxia + airway protection   · 8/25: Bronch: Some mucus plugging present on the left. · 8/26: CT chest: Complete right lower lobe and near complete left lower lobe atelectasis + edema  · 8/31: Completed Zosyn x 7 days   · 9/8: Nicole Dy placed    Plan:  · Trach collar during day 35%, wean FiO2 goal SpO2 >92  · ACVC 18/540/40/6 HS to avoid atelectasis, assure normocapnea  · Discharge home today   · Follow up with Dr. Albina Sharp (pul) outpatient for f/u with home vent/trach management  · Continue aggressive pulmonary hygiene     Neuromuscular weakness [facility])  Assessment & Plan  Patient initially presented to [facility] on 8/23 with hypoxia and encephalopathy. Acute change in GCS prompted rapid response and subsequent intubation for airway protection. Of note, pt w/ recent hospitalization to [facility] 8/11-8/17 for encephalopathy with unclear etiology despite extensive workup but Wernicke's encephalopathy was on the differential.     ·  [place] workup 8/11:  · Rapid response and intubation on 8/12 for airway protection, extubated on 8/13  · CT head 8/11: No acute intracranial abnormality. Stable small bilateral subcortical hypoattenuating foci. No associated mass effect. · MRI 8/12: No acute intracranial abnormality. Stable nonspecific enhancement along the anterior floor of the third ventricle/hypothalamus compared to March 2023 study. Findings below:  · VEEG 8/12: negative for seizure activity. Possible findings relating to underlying sleep disorder   · LP: Grossly negative overall  · DSPO: Treated with high dose thiamine. Psych evaluated, possible bipolar disorder.  Discharged to home with his sister with visiting nurses, ST/PT/OT  · [facility] 8/23: Presented with hypoxia found by visiting nurse SPO2 in 80's  · 8/24: RRT > intubated for airway protection   · [facility]red [facility] on 8/24  · [facility] 8/24 (Current hospital course)  · CT head 8/24: No acute intracranial abnormality  · 8/25 EEG: No evidence of electrographic seizures. Mild background slowing suggestive of underlying cerebral dysfunction from toxic/metablic/infectious/hypoxic encephalopathy. Clinical correlation is recommended  · Ach binding and blocking Ab negative   · MUSK Ab and ACHR modulating Ab neg  · Serum anti-TPO and DARIN Ab's, RPR, neg. CSF paraneoplastic panel neg  · Varicella PCR neg  · Toxoplasma antibodies negative  · Rheum labs negative  · 9/6 repeat LP cultures negative   · 9/7 Completed 7 days of Ceftriaxone to cover for possible neurosyphillis  · MRI 9/9: No acute intracranial pathology. Unremarkable MRI of the orbits. Improving enhancement of the hypothalamus. 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 · 9/10 completed 3 days pulse dose steroids  · 9/11 completed 5 days IVIG  · 9/15 EMG revealed no acute motor neuron disease     Plan:  · F/u outpatient with neurology in 4 weeks ; no clear diagnosis at this time  · PT/OT    Acute pulmonary embolism without acute cor pulmonale (HCC)  Assessment & Plan  · Continue Eliquis     Status post tracheostomy [facility])  Assessment & Plan  · Trach care  · Trach sutures removed this morning 9/23     S/P percutaneous endoscopic gastrostomy (PEG) tube placement [facility])  Assessment & Plan  · Continue tube feeds at goal     History of LVH (left ventricular hypertrophy)  Assessment & Plan  PTA Med: Torsemide 20 mg daily  · Remains off torsemide     Depression  Assessment & Plan  · Fluoxetine 10 mg daily    Seminoma of left testis [facility])  Assessment & Plan  · Testicular cancer s/p left orchiectomy on 12/2022  · Repeat CT scan in January 2023 with enlarging lymph node.   · Began Chemo in March 2023: Etoposide and Cisplatin with plan for 4 cycles, 5 days every 21 days  · Did not complete chemo treatment due to adverse effects  · After 3rd cycle reported double vision, labile affect and right sided weakness and therefore chemotherapy was stopped on 5/26/23  · Outpatient follow up with Heme/Onc     Umbilical hernia without obstruction and without gangrene  Assessment & Plan  · Umbilical hernia present, easily reducible       Significant Findings, Care, Treatment and Services Provided:     9/22 required full vent setting HS 2/2 bradypnea   9/21 mildly hypercapnic on AM ABG -- add PS HS  9/15 trach collar  9/11 NIF -17  9/9 MRI: No acute intracranial pathology. Improving enhancement of the hypothalamus. 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 9/8: trach/ peg   9/8 start pulse dose steroids (3 days total)  9/7: started IVIG (5 days total)  9/7: completed 7 days ceftriaxone   9/6: LP  9/1 NIF -31, start Ctx x7 days to complete neurosyphillis trtmt  8/31 NIF -25 to -30, Zosyn complete  8/30 lasix 40 x1  8/29: Returned to full settings after tolerating PS all day 28th. Tachypnea/increased sputum. Failed weaning thereafter  8/28: VAS duplex LE bilateral: No acute or chronic DVT bilaterally. No superficial thrombophlebitis noted bilaterally  8/26 CT chest w/o contrast: Complete right lower lobe and near complete left lower lobe atelectasis. Pneumonia not excluded in the appropriate clinical setting. Mild interstitial edema with trace effusions. Hepatic steatosis. 8/25 Bronch: LLL mucous plugging  8/24 CT head: Neg  8/23 CTA chest PE study: Pulmonary embolism in the right lower lobe segmental pulmonary artery. Measured RV/LV ratio is within normal limits at less than 0.9.  Hepatic steatosis    Discharge Diagnosis: Acute respiratory failure with hypoxia and hypercapnia secondary to neuromuscular weakness     Medical Problems     Resolved Problems  Date Reviewed: 9/23/2023          Resolved    Hypertension 9/19/2023     Resolved by  AMBER Blount    Hyponatremia 8/28/2023     Resolved by  Tamara Miranda MD    Sepsis [facility]) 8/30/2023     Resolved by  Boby Osorio MD          Condition at Discharge: good       Discharge instructions/Information to patient and family:   See after visit summary for information provided to patient and family. Provisions for Follow-Up Care:  See after visit summary for information related to follow-up care and any pertinent home health orders. PCP: Adria Gillespie MD    Disposition: See After Visit Summary for discharge disposition information. Planned Readmission: No      Discharge Statement   I spent 20 minutes discharging the patient. This time was spent on the day of discharge. I had direct contact with the patient on the day of discharge. Additional documentation is required if more than 30 minutes were spent on discharge. Discharge Medications:  See after visit summary for reconciled discharge medications provided to patient and family.

## 2023-09-23 NOTE — NURSING NOTE
Patient discharged to home via [facility] with trach/trach mask in place. Trach sutures removed by surgery PA prior to discharge and trach care/dressings changed. Peripheral IV x 2 removed and intact. All belongings including cell phone and personal care items sent home with pt. AVS reviewed with pt's sister Nomi Marquis via phone and copy sent home with pt. VNA and follow up appointments arranged. PEG site intact , flushed and clamped with tube feeding held one hour prior to transport arrival. All prescriptions sent escribe to pharmacy.

## 2023-09-23 NOTE — RESPIRATORY THERAPY NOTE
Pt placed on trach collar 35%tc sat 97%. HR. 72 RR 26. Vent on stby pt tolerating well. Pt in no distress at this time.

## 2023-09-23 NOTE — CASE MANAGEMENT
Case Management Discharge Planning Note    Patient name Francisco Javier Scott  Location ICU /ICU 01 MRN [id number]  : 1985 Date 2023       Current Admission Date: 2023  Current Admission Diagnosis:Acute respiratory failure with hypoxia and hypercapnia [facility])   Patient Active Problem List    Diagnosis Date Noted   • Motor neuron disease [facility])    • Status post tracheostomy ([address]) 2023   • S/P percutaneous endoscopic gastrostomy (PEG) tube placement ([address]) 2023   • Anxiety 2023   • Acute respiratory failure with hypoxia and hypercapnia ([address]) 2023   • Acute pulmonary embolism without acute cor pulmonale ([address]) 2023   • History of Wernicke's encephalopathy 2023   • Depression 2023   • Neuromuscular weakness ([address]) 2023   • History of LVH (left ventricular hypertrophy) 2023   • Pure hypertriglyceridemia 2023   • Encephalopathy 2023   • Unilateral vestibular schwannoma ([address]) 2023   • Port-A-Cath in place 2023   • Diplopia 2023   • 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   • Urinary hesitancy    • Umbilical hernia without obstruction and without gangrene 12/10/2022   • Tobacco use 12/10/2022   • Retroperitoneal lymphadenopathy 12/10/2022      LOS (days): 30  Geometric Mean LOS (GMLOS) (days):   Days to GMLOS:     OBJECTIVE:  Risk of Unplanned Readmission Score: 30.94         Current admission status: Inpatient   Preferred Pharmacy:   [facility], [address] [place] [address] [address]  Phone: [phone] Fax: [phone]    Primary Care Provider: Danielle Zarate MD    Primary Insurance: [address]  Secondary Insurance:     DISCHARGE DETAILS:    Discharge planning discussed with[de-identified] Norm Asa of Choice: Yes  Comments - Freedom of Choice: CM confirmed choice is to D/C home with [facility]  CM contacted family/caregiver?: Yes  Were Treatment Team discharge recommendations reviewed with patient/caregiver?: Yes  Did patient/caregiver verbalize understanding of patient care needs?: Yes  Were patient/caregiver advised of the risks associated with not following Treatment Team discharge recommendations?: Yes    Contacts  Patient Contacts: Dmitry Swanson  Relationship to Patient[de-identified] Family (sister)  Contact Method: Phone  Phone Number: [phone]  Reason/Outcome: Emergency Contact, Discharge Planning    Would you like to participate in our [id number] Private service program?  : No - Declined    Treatment Team Recommendation: Short Term Rehab (REFUSING)  Discharge Destination Plan[de-identified] Home with [facility]  Transport at Discharge : [facility]  Dispatcher Contacted: Yes  Number/Name of Dispatcher: ROUNDTRIP  Transported by [facility] and Unit #):  [facility]  ETA of Transport (Date): 09/23/23  ETA of Transport (Time): 1000

## 2023-09-24 ENCOUNTER — HOME CARE VISIT (OUTPATIENT)
Dept: HOME HEALTH SERVICES | Facility: HOME HEALTHCARE | Age: 38
End: 2023-09-24
Payer: COMMERCIAL

## 2023-09-24 ENCOUNTER — HOSPITAL ENCOUNTER (INPATIENT)
Facility: HOSPITAL | Age: 38
LOS: 2 days | Discharge: HOME HEALTH | DRG: 252 | End: 2023-09-28
Attending: EMERGENCY MEDICINE | Admitting: STUDENT IN AN ORGANIZED HEALTH CARE EDUCATION/TRAINING PROGRAM
Payer: COMMERCIAL

## 2023-09-24 ENCOUNTER — APPOINTMENT (EMERGENCY)
Dept: RADIOLOGY | Facility: HOSPITAL | Age: 38
DRG: 252 | End: 2023-09-24
Payer: COMMERCIAL

## 2023-09-24 ENCOUNTER — APPOINTMENT (OUTPATIENT)
Dept: RADIOLOGY | Facility: HOSPITAL | Age: 38
DRG: 252 | End: 2023-09-24
Payer: COMMERCIAL

## 2023-09-24 VITALS
DIASTOLIC BLOOD PRESSURE: 70 MMHG | OXYGEN SATURATION: 98 % | BODY MASS INDEX: 34.7 KG/M2 | RESPIRATION RATE: 18 BRPM | TEMPERATURE: 98 F | DIASTOLIC BLOOD PRESSURE: 70 MMHG | TEMPERATURE: 98.7 F | HEIGHT: 76 IN | HEART RATE: 87 BPM | WEIGHT: 285 LBS | OXYGEN SATURATION: 98 % | RESPIRATION RATE: 18 BRPM | HEART RATE: 87 BPM | SYSTOLIC BLOOD PRESSURE: 128 MMHG | SYSTOLIC BLOOD PRESSURE: 120 MMHG

## 2023-09-24 DIAGNOSIS — K94.23 LEAKING PERCUTANEOUS ENDOSCOPIC GASTROSTOMY (PEG) TUBE (HCC): ICD-10-CM

## 2023-09-24 DIAGNOSIS — J96.11 CHRONIC RESPIRATORY FAILURE WITH HYPOXIA AND HYPERCAPNIA (HCC): ICD-10-CM

## 2023-09-24 DIAGNOSIS — J96.12 CHRONIC RESPIRATORY FAILURE WITH HYPOXIA AND HYPERCAPNIA (HCC): ICD-10-CM

## 2023-09-24 DIAGNOSIS — K94.23 PEG TUBE MALFUNCTION (HCC): Primary | ICD-10-CM

## 2023-09-24 PROBLEM — Z74.09 IMPAIRED PHYSICAL MOBILITY: Status: ACTIVE | Noted: 2023-09-24

## 2023-09-24 PROBLEM — G70.9 NEUROMUSCULAR WEAKNESS (HCC): Status: RESOLVED | Noted: 2023-08-12 | Resolved: 2023-09-24

## 2023-09-24 PROBLEM — J18.1 MULTIFOCAL LUNG CONSOLIDATION (HCC): Status: ACTIVE | Noted: 2023-09-24

## 2023-09-24 LAB
ALBUMIN SERPL BCP-MCNC: 3.3 G/DL (ref 3.5–5)
ALP SERPL-CCNC: 74 U/L (ref 34–104)
ALT SERPL W P-5'-P-CCNC: 79 U/L (ref 7–52)
ANION GAP SERPL CALCULATED.3IONS-SCNC: 8 MMOL/L
AST SERPL W P-5'-P-CCNC: 34 U/L (ref 13–39)
BASOPHILS # BLD AUTO: 0.02 THOUSANDS/ÂΜL (ref 0–0.1)
BASOPHILS NFR BLD AUTO: 0 % (ref 0–1)
BILIRUB SERPL-MCNC: 0.71 MG/DL (ref 0.2–1)
BUN SERPL-MCNC: 15 MG/DL (ref 5–25)
CALCIUM ALBUM COR SERPL-MCNC: 9.7 MG/DL (ref 8.3–10.1)
CALCIUM SERPL-MCNC: 9.1 MG/DL (ref 8.4–10.2)
CHLORIDE SERPL-SCNC: 99 MMOL/L (ref 96–108)
CO2 SERPL-SCNC: 32 MMOL/L (ref 21–32)
CREAT SERPL-MCNC: 0.77 MG/DL (ref 0.6–1.3)
EOSINOPHIL # BLD AUTO: 0.1 THOUSAND/ÂΜL (ref 0–0.61)
EOSINOPHIL NFR BLD AUTO: 1 % (ref 0–6)
ERYTHROCYTE [DISTWIDTH] IN BLOOD BY AUTOMATED COUNT: 15.3 % (ref 11.6–15.1)
GFR SERPL CREATININE-BSD FRML MDRD: 116 ML/MIN/1.73SQ M
GLUCOSE SERPL-MCNC: 87 MG/DL (ref 65–140)
HCT VFR BLD AUTO: 37.4 % (ref 36.5–49.3)
HGB BLD-MCNC: 11.6 G/DL (ref 12–17)
IMM GRANULOCYTES # BLD AUTO: 0.04 THOUSAND/UL (ref 0–0.2)
IMM GRANULOCYTES NFR BLD AUTO: 1 % (ref 0–2)
LACTATE SERPL-SCNC: 1.1 MMOL/L (ref 0.5–2)
LYMPHOCYTES # BLD AUTO: 1.69 THOUSANDS/ÂΜL (ref 0.6–4.47)
LYMPHOCYTES NFR BLD AUTO: 20 % (ref 14–44)
MAGNESIUM SERPL-MCNC: 2.3 MG/DL (ref 1.9–2.7)
MCH RBC QN AUTO: 30.6 PG (ref 26.8–34.3)
MCHC RBC AUTO-ENTMCNC: 31 G/DL (ref 31.4–37.4)
MCV RBC AUTO: 99 FL (ref 82–98)
MONOCYTES # BLD AUTO: 0.7 THOUSAND/ÂΜL (ref 0.17–1.22)
MONOCYTES NFR BLD AUTO: 8 % (ref 4–12)
NEUTROPHILS # BLD AUTO: 6.08 THOUSANDS/ÂΜL (ref 1.85–7.62)
NEUTS SEG NFR BLD AUTO: 70 % (ref 43–75)
NRBC BLD AUTO-RTO: 0 /100 WBCS
PLATELET # BLD AUTO: 247 THOUSANDS/UL (ref 149–390)
PMV BLD AUTO: 10.5 FL (ref 8.9–12.7)
POTASSIUM SERPL-SCNC: 4.4 MMOL/L (ref 3.5–5.3)
PROT SERPL-MCNC: 7.3 G/DL (ref 6.4–8.4)
RBC # BLD AUTO: 3.79 MILLION/UL (ref 3.88–5.62)
SODIUM SERPL-SCNC: 139 MMOL/L (ref 135–147)
WBC # BLD AUTO: 8.63 THOUSAND/UL (ref 4.31–10.16)

## 2023-09-24 PROCEDURE — 80053 COMPREHEN METABOLIC PANEL: CPT | Performed by: EMERGENCY MEDICINE

## 2023-09-24 PROCEDURE — 99285 EMERGENCY DEPT VISIT HI MDM: CPT

## 2023-09-24 PROCEDURE — G0299 HHS/HOSPICE OF RN EA 15 MIN: HCPCS

## 2023-09-24 PROCEDURE — 74176 CT ABD & PELVIS W/O CONTRAST: CPT

## 2023-09-24 PROCEDURE — 94760 N-INVAS EAR/PLS OXIMETRY 1: CPT

## 2023-09-24 PROCEDURE — 36415 COLL VENOUS BLD VENIPUNCTURE: CPT | Performed by: EMERGENCY MEDICINE

## 2023-09-24 PROCEDURE — G1004 CDSM NDSC: HCPCS

## 2023-09-24 PROCEDURE — 93005 ELECTROCARDIOGRAM TRACING: CPT

## 2023-09-24 PROCEDURE — NC001 PR NO CHARGE: Performed by: NURSE PRACTITIONER

## 2023-09-24 PROCEDURE — 99285 EMERGENCY DEPT VISIT HI MDM: CPT | Performed by: EMERGENCY MEDICINE

## 2023-09-24 PROCEDURE — 87081 CULTURE SCREEN ONLY: CPT | Performed by: ANESTHESIOLOGY

## 2023-09-24 PROCEDURE — 99223 1ST HOSP IP/OBS HIGH 75: CPT | Performed by: NURSE PRACTITIONER

## 2023-09-24 PROCEDURE — 94150 VITAL CAPACITY TEST: CPT

## 2023-09-24 PROCEDURE — 94640 AIRWAY INHALATION TREATMENT: CPT

## 2023-09-24 PROCEDURE — 74018 RADEX ABDOMEN 1 VIEW: CPT

## 2023-09-24 PROCEDURE — 83605 ASSAY OF LACTIC ACID: CPT | Performed by: NURSE PRACTITIONER

## 2023-09-24 PROCEDURE — 85025 COMPLETE CBC W/AUTO DIFF WBC: CPT | Performed by: EMERGENCY MEDICINE

## 2023-09-24 PROCEDURE — 83735 ASSAY OF MAGNESIUM: CPT | Performed by: EMERGENCY MEDICINE

## 2023-09-24 PROCEDURE — 94002 VENT MGMT INPAT INIT DAY: CPT

## 2023-09-24 RX ORDER — FLUOXETINE 10 MG/1
10 CAPSULE ORAL DAILY
Status: DISCONTINUED | OUTPATIENT
Start: 2023-09-25 | End: 2023-09-28 | Stop reason: HOSPADM

## 2023-09-24 RX ORDER — MELATONIN
1000 DAILY
Status: DISCONTINUED | OUTPATIENT
Start: 2023-09-25 | End: 2023-09-28 | Stop reason: HOSPADM

## 2023-09-24 RX ORDER — CHLORHEXIDINE GLUCONATE ORAL RINSE 1.2 MG/ML
15 SOLUTION DENTAL EVERY 12 HOURS SCHEDULED
Status: DISCONTINUED | OUTPATIENT
Start: 2023-09-24 | End: 2023-09-28 | Stop reason: HOSPADM

## 2023-09-24 RX ORDER — SENNOSIDES 8.8 MG/5ML
17.6 LIQUID ORAL DAILY PRN
Status: DISCONTINUED | OUTPATIENT
Start: 2023-09-24 | End: 2023-09-28 | Stop reason: HOSPADM

## 2023-09-24 RX ORDER — ALBUTEROL SULFATE 2.5 MG/3ML
2.5 SOLUTION RESPIRATORY (INHALATION)
Status: DISCONTINUED | OUTPATIENT
Start: 2023-09-25 | End: 2023-09-28 | Stop reason: HOSPADM

## 2023-09-24 RX ORDER — POLYETHYLENE GLYCOL 3350 17 G/17G
17 POWDER, FOR SOLUTION ORAL DAILY PRN
Status: DISCONTINUED | OUTPATIENT
Start: 2023-09-24 | End: 2023-09-28 | Stop reason: HOSPADM

## 2023-09-24 RX ORDER — ACETAMINOPHEN 160 MG/5ML
650 SUSPENSION ORAL EVERY 6 HOURS PRN
Status: DISCONTINUED | OUTPATIENT
Start: 2023-09-24 | End: 2023-09-28 | Stop reason: HOSPADM

## 2023-09-24 RX ORDER — LANOLIN ALCOHOL/MO/W.PET/CERES
100 CREAM (GRAM) TOPICAL DAILY
Status: DISCONTINUED | OUTPATIENT
Start: 2023-09-25 | End: 2023-09-28 | Stop reason: HOSPADM

## 2023-09-24 RX ORDER — ACETYLCYSTEINE 200 MG/ML
3 SOLUTION ORAL; RESPIRATORY (INHALATION)
Status: DISCONTINUED | OUTPATIENT
Start: 2023-09-25 | End: 2023-09-28 | Stop reason: HOSPADM

## 2023-09-24 RX ORDER — SODIUM CHLORIDE, SODIUM GLUCONATE, SODIUM ACETATE, POTASSIUM CHLORIDE, MAGNESIUM CHLORIDE, SODIUM PHOSPHATE, DIBASIC, AND POTASSIUM PHOSPHATE .53; .5; .37; .037; .03; .012; .00082 G/100ML; G/100ML; G/100ML; G/100ML; G/100ML; G/100ML; G/100ML
50 INJECTION, SOLUTION INTRAVENOUS CONTINUOUS
Status: DISCONTINUED | OUTPATIENT
Start: 2023-09-24 | End: 2023-09-25

## 2023-09-24 RX ADMIN — SODIUM CHLORIDE, SODIUM GLUCONATE, SODIUM ACETATE, POTASSIUM CHLORIDE, MAGNESIUM CHLORIDE, SODIUM PHOSPHATE, DIBASIC, AND POTASSIUM PHOSPHATE 50 ML/HR: .53; .5; .37; .037; .03; .012; .00082 INJECTION, SOLUTION INTRAVENOUS at 21:17

## 2023-09-24 RX ADMIN — ACETYLCYSTEINE 600 MG: 200 SOLUTION ORAL; RESPIRATORY (INHALATION) at 23:15

## 2023-09-24 RX ADMIN — ALBUTEROL SULFATE 2.5 MG: 2.5 SOLUTION RESPIRATORY (INHALATION) at 23:15

## 2023-09-24 RX ADMIN — CHLORHEXIDINE GLUCONATE 15 ML: 1.2 RINSE ORAL at 21:15

## 2023-09-24 NOTE — ASSESSMENT & PLAN NOTE
Recently admitted for acute respiratory failure with notable following events:  • 8/12-8/13: intubated for airway protection when acutely had GCS of 5 at [facility]  • 8/23 CTA PE study: PE in the right lower lobe segmental pulmonary artery  • 8/24 RRT > intubated for hypoxia + airway protection   • 8/25: Bronch: Some mucus plugging present on the left.   • 8/26: CT chest: Complete right lower lobe and near complete left lower lobe atelectasis + edema  • 8/31: Completed Zosyn x 7 days   • 9/8: Levar Gonzalez placed        Plan:    • Currently stable from a respiratory standpoint  • Trach collar during day 35%, wean FiO2 goal SpO2 >92  • ACVC 18/540/40/6 HS to avoid atelectasis, assure normocapnea  • Follow up with Dr. Mp Mccracken (pulm) outpatient for f/u with home vent/trach management, vs continuation of care at [facility] given complications experienced at home after discharge yesterday  • Continue aggressive pulmonary hygiene

## 2023-09-24 NOTE — ASSESSMENT & PLAN NOTE
• Testicular cancer 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 • Began Chemo in March 2023: Etoposide and Cisplatin with plan for 4 cycles, 5 days every 21 days  • Did not complete chemo treatment due to adverse effects  ?  After 3rd cycle reported double vision, labile affect and right sided weakness and therefore chemotherapy was stopped on 5/26/23  • Outpatient follow up with Heme/Onc

## 2023-09-24 NOTE — ASSESSMENT & PLAN NOTE
• PEG tube placed 9/8.  Had been tolerating tube feeds at goal prior to discharge yesterday  • Now admitted with leaking PEG, see plan as outlined above

## 2023-09-24 NOTE — H&P
[address]  H&P  Name: Mahsa Crews 41 y.o. male I MRN: [id number]  Unit/Bed#: ICU 08 I Date of Admission: 9/24/2023   Date of Service: 9/24/2023 I Hospital Day: 0      Assessment/Plan   * Leaking percutaneous endoscopic gastrostomy (PEG) tube [facility])  Assessment & Plan  · Brought to ED for concern of PEG dislodgement and leaking  · KUB completed, report pending  · CT abdomen pending  · On exam, PEG has resistance when attempting to use. There is immediate drainage from the site when flushed  · Hold off on med administration and TF pending CT results  · Consider Surgical consult for tube exchange pending CT/KUB results    Chronic respiratory failure with hypoxia and hypercapnia ([address])  Assessment & Plan    Recently admitted for acute respiratory failure with notable following events:  • 8/12-8/13: intubated for airway protection when acutely had GCS of 5 at [facility]  • 8/23 CTA PE study: PE in the right lower lobe segmental pulmonary artery  • 8/24 RRT > intubated for hypoxia + airway protection   • 8/25: Bronch: Some mucus plugging present on the left. • 8/26: CT chest: Complete right lower lobe and near complete left lower lobe atelectasis + edema  • 8/31: Completed Zosyn x 7 days   • 9/8: Aimee Malka placed        Plan:    • Currently stable from a respiratory standpoint  • Trach collar during day 35%, wean FiO2 goal SpO2 >92  • ACVC 18/540/40/6 HS to avoid atelectasis, assure normocapnea  • Follow up with Dr. Preethi Medina (pulm) outpatient for f/u with home vent/trach management, vs continuation of care at [facility] given complications experienced at home after discharge yesterday  • Continue aggressive pulmonary hygiene          Acute pulmonary embolism without acute cor pulmonale ([address])  Assessment & Plan    • Continue Eliquis once PEG placement confirmed on CT.  Consideration for interim Heparin drip if PEG not deemed usable      Neuromuscular weakness [facility])  Assessment & Plan    Patient initially presented to [facility] on 8/23 with hypoxia and encephalopathy. Acute change in GCS prompted rapid response and subsequent intubation for airway protection. Of note, pt w/ recent hospitalization to [facility] 8/11-8/17 for encephalopathy with unclear etiology despite extensive workup but Wernicke's encephalopathy was on the differential.     •  [place] workup 8/11:  ? Rapid response and intubation on 8/12 for airway protection, extubated on 8/13  ? CT head 8/11: No acute intracranial abnormality. Stable small bilateral subcortical hypoattenuating foci. No associated mass effect. ? MRI 8/12: No acute intracranial abnormality. Stable nonspecific enhancement along the anterior floor of the third ventricle/hypothalamus compared to March 2023 study. Findings below:  ? VEEG 8/12: negative for seizure activity. Possible findings relating to underlying sleep disorder   ? LP: Grossly negative overall  ? DSPO: Treated with high dose thiamine. Psych evaluated, possible bipolar disorder. Discharged to home with his sister with visiting nurses, ST/PT/OT  • [facility] 8/23: Presented with hypoxia found by visiting nurse SPO2 in 80's  ? 8/24: RRT > intubated for airway protection   ? Transferred to [facility] on 8/24  • [facility] 8/24-9/23  ? CT head 8/24: No acute intracranial abnormality  ? 8/25 EEG: No evidence of electrographic seizures. Mild background slowing suggestive of underlying cerebral dysfunction from toxic/metablic/infectious/hypoxic encephalopathy. Clinical correlation is recommended  ? Ach binding and blocking Ab negative   ? MUSK Ab and ACHR modulating Ab neg  ? Serum anti-TPO and DARIN Ab's, RPR, neg. CSF paraneoplastic panel neg  ? Varicella PCR neg  ? Toxoplasma antibodies negative  ? Rheum labs negative  ? 9/6 repeat LP cultures negative   ? 9/7 Completed 7 days of Ceftriaxone to cover for possible neurosyphillis  ? MRI 9/9: No acute intracranial pathology. Unremarkable MRI of the orbits.   Improving enhancement of the hypothalamus. 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 ? 9/10 completed 3 days pulse dose steroids  ? 9/11 completed 5 days IVIG  • 9/15 EMG revealed no acute motor neuron disease      Plan:  • F/u outpatient with neurology in 4 weeks ; no clear diagnosis at this time  • PT/OT while admitted  • Would recommend discharge to an acute care facility with daily PT/OT capabilities to optimize patient's recovery       Impaired physical mobility  Assessment & Plan  · Required ceiling lift for mobility while hospitalized, as well as aggressive PT/OT for his deconditioned status  · Was discharged yesterday to home with visiting nurses and per his sister, has not yet received/been set up for PT/OT per her knowledge. Unfortunately his mobilization chair has not yet been delivered to his home, and he has remained in bed since discharge. · There is concern for insufficient resources at his home, and he is at high risk for skin breakdown given his inability to offload pressure. Would recommend interim discharge to an acute care rehab where he has access to more aggressive PT/OT and lift equipment until he is able to regain some strength and mobility  · PT/OT consulted  · Case Management for assistance with discharge    Status post tracheostomy [facility])  Assessment & Plan    • Trach care  • Trach sutures removed 9/23         S/P percutaneous endoscopic gastrostomy (PEG) tube placement [facility])  Assessment & Plan    • PEG tube placed 9/8.  Had been tolerating tube feeds at goal prior to discharge yesterday  • Now admitted with leaking PEG, see plan as outlined above      History of LVH (left ventricular hypertrophy)  Assessment & Plan    PTA Med: Torsemide 20 mg daily  • Remains off torsemide          Depression  Assessment & Plan    • Fluoxetine 10 mg daily      Seminoma of left testis [facility])  Assessment & Plan    • Testicular cancer 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 • Began Chemo in March 2023: Etoposide and Cisplatin with plan for 4 cycles, 5 days every 21 days  • Did not complete chemo treatment due to adverse effects  ? After 3rd cycle reported double vision, labile affect and right sided weakness and therefore chemotherapy was stopped on 5/26/23  • Outpatient follow up with Heme/Onc       Umbilical hernia without obstruction and without gangrene  Assessment & Plan    • Umbilical hernia present, easily reducible            History of Present Illness     HPI: Yael Thornton is a 41 y.o. with PMHx of seminoma of left testis s/p left orchiectomy and chemo (currently on hold), HTN, LVH, depression, obesity, chronic respiratory failure, and neuromuscular weakness. Was recently admitted 8/24-9/23 due to altered mental status, hypoxia, and weakness. Initial CTA PE demonstrated right lower lobe segmental PE without evidence of RV strain. He was started on [facility] and stable on nasal cannula. The following day he was emergently intubated for airway protection and hypoxia. He initially required high ventilator settings along with bronchoscopy for LLL mucous plugging. He completed a course of Zosyn for pneumonia and was eventually weaned to minimal vent settings, however, was unable to tolerate PSV, had insufficient NIF, and was unable to be extubated. He had a trach and PEG placed on 9/8. During his admission, he continued with encephalopathy and weakness. Neurology assisted with an extensive workup essentially without any positive etiology thus far. This included EEG (no seizures) concerning for toxic/metabolic/infections/hypoxic encephalopathy, LP (negative for infection or paraneoplasm), autoimmune labs, and MRI (improving enhancement of hypothalamus). (See further details under encephalopathy plan). He received 3 days of pulse dose steroids and 5 days of IVIG for concern of an autoimmune etiology. Symptoms were not significantly improved.  An EMG 9/15 revealed no acute motor neuron disease. He was recommended for acute rehab. He was ultimately discharged to home with visiting nurses yesterday 9/23. Falguni Cantu presented back to the ED this afternoon due to PEG site leakage. Per his sister, his tube was connected to TF immediately upon discharge home. Shortly thereafter the PEG was noted to be leaking from the site. Falguni Cantu denies any other complaints including SOB, CP, fever, chills, or abdominal pain. A KUB was completed but is not yet read. He will have a CT of his abdomen to further evaluate his PEG positioning. Case was discussed via TT with the on-call ICU attending. He will be admitted to [facility] for continued management. We will consult Case Management for assistance with discharge. Would highly recommend interim admission to an acute rehab where he can receive optimal PT/OT therapies. History obtained from sibling and chart review. Review of Systems   Unable to perform ROS: Other (Trach in place limiting ROS. Nods appropriately to questions. Sister able to assist with ROS)   Constitutional: Negative for chills, fatigue and fever. HENT: Negative. Respiratory: Negative. Negative for shortness of breath. Cardiovascular: Negative for chest pain, palpitations and leg swelling. Gastrointestinal: Negative for abdominal distention and abdominal pain. Reports PEG leaking since discharge to home     Genitourinary: Negative. Musculoskeletal: Negative. Neurological: Positive for weakness (at baseline). Psychiatric/Behavioral: Negative. All other systems reviewed and are negative. Historical Information   Past Medical History:  No date:  Anxiety  No date: Cancer ([address])  No date: Depression  No date: Psychiatric disorder      Comment:  bipolar Past Surgical History:  1/20/2023: IR BIOPSY LYMPH NODE  9/6/2023: IR LUMBAR PUNCTURE  3/14/2023: IR PORT PLACEMENT  12/14/2022: ORCHIECTOMY; Left      Comment:  Procedure: ORCHIECTOMY INGUINAL;  Surgeon: Hien Templeton MD;  Location:  MAIN OR;  Service: Urology  2023: PEG W/TRACHEOSTOMY PLACEMENT; N/A      Comment:  Procedure: TRACHEOSTOMY WITH INSERTION PEG TUBE;                 Surgeon: Genell Apgar, MD;  Location: [facility];                 Service: General   Current Outpatient Medications   Medication Instructions   • acetaminophen (TYLENOL) 650 mg, Per G Tube, Every 6 hours PRN   • albuterol 2.5 mg, Nebulization, Every 6 hours PRN   • apixaban (ELIQUIS) 5 mg, Per PEG Tube, 2 times daily   • cholecalciferol (VITAMIN D3) 1,000 Units, Per G Tube, Daily   • FLUoxetine (PROZAC) 10 mg, Per G Tube, Daily   • polyethylene glycol (MIRALAX) 17 g, Per PEG Tube, Daily PRN   • senna 17.6 mg, Per PEG Tube, Daily PRN   • thiamine 100 mg, Per G Tube, Daily    No Known Allergies   Social History     Tobacco Use   • Smoking status: Former     Packs/day: 0.50     Years: 5.00     Total pack years: 2.50     Types: Cigarettes     Start date: 2008   • Smokeless tobacco: Never   Vaping Use   • Vaping Use: Never used   Substance Use Topics   • Alcohol use: Not Currently     Comment: 2 cases of beer daily, stopped 3 years ago   • Drug use: Yes     Types: Marijuana     Comment: Medical marijuana    Family History   Problem Relation Age of Onset   • Coronary artery disease Maternal Aunt           Objective                            Vitals I/O      Most Recent Min/Max in 24hrs   Temp 97.6 °F (36.4 °C) Temp  Min: 97.5 °F (36.4 °C)  Max: 97.6 °F (36.4 °C)   Pulse 72 Pulse  Min: 66  Max: 72   Resp 21 Resp  Min: 20  Max: 21   /78 BP  Min: 118/78  Max: 127/85   O2 Sat 96 % SpO2  Min: 96 %  Max: 100 %      Intake/Output Summary (Last 24 hours) at 2023  Last data filed at 2023 1900  Gross per 24 hour   Intake --   Output 0 ml   Net 0 ml         Diet NPO     Invasive Monitoring Physical exam   N/A Physical Exam  Eyes:      General: Lids are normal.   Skin: General: Skin is warm and dry. HENT:      Head: Normocephalic. Mouth/Throat:      Mouth: Mucous membranes are moist.   Neck:      Comments: Tracheostomy site clean, dry. Cardiovascular:      Rate and Rhythm: Normal rate and regular rhythm. Pulses:           Radial pulses are 2+ on the right side and 2+ on the left side. Dorsalis pedis pulses are 2+ on the right side and 2+ on the left side. Heart sounds: Normal heart sounds. Musculoskeletal:      Right lower leg: No edema. Left lower leg: No edema. Abdominal:      General: Bowel sounds are normal.      Palpations: Abdomen is soft. Tenderness: There is no abdominal tenderness. Comments: PEG with mild maceration under bumper. Noted to leak fluid when attempting to flush. Some mild resistance met when flushing. Constitutional:       General: He is not in acute distress. Appearance: He is ill-appearing. Comments: Trach mask in place   Pulmonary:      Effort: Pulmonary effort is normal.      Breath sounds: Rhonchi (few scattered b/l rhonchi noted. Coughs up thick tan sputum via trach) present. Psychiatric:         Attention and Perception: Attention normal.         Mood and Affect: Mood normal.         Behavior: Behavior is cooperative. Neurological:      General: No focal deficit present. Mental Status: He is alert. GCS: GCS eye subscore is 4. GCS verbal subscore is 1. GCS motor subscore is 6. Motor: Weakness (generalized. +3/5 of all extremities. Able to slowly lift extremities against gravity. ). Genitourinary/Anorectal:     Comments: Clear yellow urine via external catheter  external catheterVitals and nursing note reviewed.               Diagnostic Studies      EKG: NSR with rate 60-70 on monitor  Imagin/24 KUB completed- report pending   CT abdomen/pelvis without contrast pending  I have personally reviewed pertinent films in PACS     Medications:  Scheduled PRN   apixaban, 5 mg, BID  chlorhexidine, 15 mL, Q12H [address]  [START ON 9/25/2023] cholecalciferol, 1,000 Units, Daily  [START ON 9/25/2023] FLUoxetine, 10 mg, Daily  iohexol, 50 mL, 90 min pre-procedure  [START ON 9/25/2023] thiamine, 100 mg, Daily      acetaminophen, 650 mg, Q6H PRN  polyethylene glycol, 17 g, Daily PRN  senna, 17.6 mg, Daily PRN       Continuous          Labs:    CBC    Recent Labs     09/24/23  1824   WBC 8.63   HGB 11.6*   HCT 37.4        BMP    Recent Labs     09/24/23  1824   SODIUM 139   K 4.4   CL 99   CO2 32   AGAP 8   BUN 15   CREATININE 0.77   CALCIUM 9.1       Coags    No recent results     Additional Electrolytes  Recent Labs     09/24/23  1824   MG 2.3          Blood Gas    Recent Labs     09/23/23  0733   PHART 7.534*   RCM8TJV 39.3   PO2ART 74.0*   TTD2API 32.4*   BEART 9.1   SOURCE Radial, Right     Recent Labs     09/23/23  0733   SOURCE Radial, Right    LFTs  Recent Labs     09/24/23  1824   ALT 79*   AST 34   ALKPHOS 74   ALB 3.3*   TBILI 0.71       Infectious  No recent results  Glucose  Recent Labs     09/24/23  1824   GLUC 87             Anticipated Length of Stay is > [address], CRNP

## 2023-09-24 NOTE — ASSESSMENT & PLAN NOTE
• Continue Eliquis once PEG placement confirmed on CT.  Consideration for interim Heparin drip if PEG not deemed usable

## 2023-09-24 NOTE — CASE COMMUNICATION
I spoke Humberto Couch at [address] [place] [address] ICU regarding pt being discharged with wrong size trach inner cannulas. Julissa spoke with Respiratory and also her supervisor and they were only able to give one correct size inner cannula. Family member went to [address] [place] [address] to  for daily cannula change today. I spoke with  Chichi Cobb on call for [facility]  to inform wrong size inner cannulas were sent for pts t [facility]. I gave her ID numbers and sizes from boxes. Informed Chichi Cobb we do not have anymore inner cannulas in home and will not have one for visit on 9.25 . Chichi Cobb spoke with her supervisor who states they can clean same inner cannula once and re-use if needed. Chichi Cobb states [employer] will call tomorrow with status of delivery update and I gave her phone numbers to reach for same.

## 2023-09-24 NOTE — CASE COMMUNICATION
[facility] has admitted your patient to [facility] service with the following disciplines:      SN, PT and OT  This report is informational only, no responses is needed  Primary focus of home health care- Pulmonary   Patient stated goals of care- Improve fatigue and gain strength. Anticipated visit pattern and next visit date- Next SN visit on 9/24 1w1 3w2 2w1 1w2  See medication list - meds in home differ from AVS  Significant  clinical findings- Pts sister has not picked up medication from pharmacy. She will be picking up today. Potential barriers to goal achievement- Fatigues easily  Other pertinent information- At [facility], sister Gayle Bingham was told to adjust cuff pressure of trach,  SN messaged [employer] from [employer]. Practitioner advised to contact care team who discharged pt in ICU. Message sent to ICU discharge nurse Vivek Lewis. ICU care team advised to  use a manometer at 30 or 8 mL of air for cuff inflation. No manometer was present or available at [facility]. Information was relayed to ICU care team about home health SN not having proper equipment or training for trach cuff pressure change. Nurse Mike Lyle and ICU practitioner stated since cuff was inflated it should be left alone until following up wit Dr. Ricardo Magaña at Pulmonology two weeks from now. Home health verified in EPIC: As per [facility] documentation on 9/23 0746, Surgical Airway Cuff Pressure inflated with 30 cm. Information was relayed to pts sister Gayle Bingham via phone call. Thank you for allowing us to participate in the care of your patient.       [facility] Colon RN

## 2023-09-24 NOTE — ASSESSMENT & PLAN NOTE
· Brought to ED for concern of PEG dislodgement and leaking  · KUB completed, report pending  · CT abdomen pending  · On exam, PEG has resistance when attempting to use.  There is immediate drainage from the site when flushed  · Hold off on med administration and TF pending CT results  · Consider Surgical consult for tube exchange pending CT/KUB results

## 2023-09-24 NOTE — ASSESSMENT & PLAN NOTE
· Required ceiling lift for mobility while hospitalized, as well as aggressive PT/OT for his deconditioned status  · Was discharged yesterday to home with visiting nurses and per his sister, has not yet received/been set up for PT/OT per her knowledge. Unfortunately his mobilization chair has not yet been delivered to his home, and he has remained in bed since discharge. · There is concern for insufficient resources at his home, and he is at high risk for skin breakdown given his inability to offload pressure.  Would recommend interim discharge to an acute care rehab where he has access to more aggressive PT/OT and lift equipment until he is able to regain some strength and mobility  · PT/OT consulted  · Case Management for assistance with discharge

## 2023-09-24 NOTE — ED PROVIDER NOTES
History  Chief Complaint   Patient presents with   • Feeding Tube Problem     Pt arrived EMS from home. EMS reports pt was D/C'd yesterday from [address]. EMS reports that visiting nurse went to home and had a problem with feeding tube. Visiting nurse advised family to send pt back to [facility]. 79-year-old male with a history of testicular cancer, currently untreated and chronic respiratory failure status post trach and PEG placement, presents to the emergency department for possible failure of PEG tube. Patient was discharged from [facility] yesterday, family states that since he has been home, they have noticed increased leakage around the insertion site of the PEG tube. They deny fevers or chills. History provided by:  EMS personnel and relative  History limited by:  Patient nonverbal   used: No    Feeding Tube Problem  Associated symptoms: no abdominal pain, no chest pain, no cough, no diarrhea, no fever, no nausea, no rash, no shortness of breath, no vomiting and no wheezing        Prior to Admission Medications   Prescriptions Last Dose Informant Patient Reported? Taking?    FLUoxetine (PROzac) 10 mg capsule Unknown  No No   Si capsule (10 mg total) by Per G Tube route daily   Sennosides (senna) 8.8 mg/5 mL oral syrup Unknown  No No   Sig: 10 mL (17.6 mg total) by Per PEG Tube route daily as needed (Constipation)   acetaminophen (TYLENOL) 160 mg/5 mL suspension Unknown  No No   Si.3 mL (650 mg total) by Per G Tube route every 6 (six) hours as needed for mild pain, fever or headaches   albuterol (2.5 mg/3 mL) 0.083 % nebulizer solution Unknown  No No   Sig: Take 3 mL (2.5 mg total) by nebulization every 6 (six) hours as needed for shortness of breath or wheezing   apixaban (ELIQUIS) 5 mg Unknown  No No   Si tablet (5 mg total) by Per PEG Tube route 2 (two) times a day   cholecalciferol (VITAMIN D3) 1,000 units tablet Unknown  No No   Si tablet (1,000 Units total) by Per G Tube route daily   polyethylene glycol (MIRALAX) 17 g packet Unknown  No No   Si g by Per PEG Tube route daily as needed (constipation)   thiamine 100 MG tablet Unknown  No No   Si tablet (100 mg total) by Per G Tube route daily      Facility-Administered Medications: None       Past Medical History:   Diagnosis Date   • Anxiety    • Cancer ([address])    • Depression    • Psychiatric disorder     bipolar       Past Surgical History:   Procedure Laterality Date   • IR BIOPSY LYMPH NODE  2023   • IR GASTROSTOMY TUBE PLACEMENT  2023   • IR LUMBAR PUNCTURE  2023   • IR PORT PLACEMENT  3/14/2023   • ORCHIECTOMY Left 2022    Procedure: ORCHIECTOMY INGUINAL;  Surgeon: Krystina العلي MD;  Location: [facility];  Service: Urology   • PEG W/TRACHEOSTOMY PLACEMENT N/A 2023    Procedure: TRACHEOSTOMY WITH INSERTION PEG TUBE;  Surgeon: Otto Mckeon MD;  Location: [facility];  Service: General       Family History   Problem Relation Age of Onset   • Coronary artery disease Maternal Aunt      I have reviewed and agree with the history as documented. E-Cigarette/Vaping   • E-Cigarette Use Never User      E-Cigarette/Vaping Substances   • Nicotine No    • THC Yes    • CBD No    • Flavoring No    • Other No    • Unknown No      Social History     Tobacco Use   • Smoking status: Former     Packs/day: 0.50     Years: 5.00     Total pack years: 2.50     Types: Cigarettes     Start date: 2008   • Smokeless tobacco: Never   Vaping Use   • Vaping Use: Never used   Substance Use Topics   • Alcohol use: Not Currently     Comment: 2 cases of beer daily, stopped 3 years ago   • Drug use: Yes     Types: Marijuana     Comment: Medical marijuana       Review of Systems   Unable to perform ROS: Patient nonverbal   Constitutional: Negative for chills and fever. Respiratory: Negative for cough, shortness of breath and wheezing. Cardiovascular: Negative for chest pain and palpitations. Gastrointestinal: Negative for abdominal pain, constipation, diarrhea, nausea and vomiting. Genitourinary: Negative for dysuria, flank pain, hematuria and urgency. Musculoskeletal: Negative for back pain. Skin: Negative for color change and rash. All other systems reviewed and are negative. Physical Exam  Physical Exam  Vitals and nursing note reviewed. Constitutional:       Appearance: He is well-developed. HENT:      Head: Normocephalic and atraumatic. Eyes:      Pupils: Pupils are equal, round, and reactive to light. Cardiovascular:      Rate and Rhythm: Normal rate and regular rhythm. Heart sounds: Normal heart sounds. Pulmonary:      Effort: Pulmonary effort is normal.      Breath sounds: Normal breath sounds. Abdominal:      General: Abdomen is protuberant. Bowel sounds are normal. There is no distension. Palpations: Abdomen is soft. There is no mass. Tenderness: There is no abdominal tenderness. There is no guarding or rebound. Musculoskeletal:      Cervical back: Normal range of motion and neck supple. Skin:     General: Skin is warm and dry. Capillary Refill: Capillary refill takes less than 2 seconds. Neurological:      Mental Status: He is alert. Mental status is at baseline.          Vital Signs  ED Triage Vitals   Temperature Pulse Respirations Blood Pressure SpO2   09/24/23 1628 09/24/23 1628 09/24/23 1628 09/24/23 1628 09/24/23 1628   97.5 °F (36.4 °C) 70 20 127/85 100 %      Temp Source Heart Rate Source Patient Position - Orthostatic VS BP Location FiO2 (%)   09/24/23 1628 09/24/23 1628 09/24/23 1628 09/24/23 1628 09/24/23 1628   Oral Monitor Sitting Right arm 28      Pain Score       09/24/23 1855       No Pain           Vitals:    09/25/23 1800 09/25/23 1900 09/25/23 1942 09/25/23 1957   BP: 107/63 104/67     Pulse: 77 85 77 82   Patient Position - Orthostatic VS:             Visual Acuity  Visual Acuity    Flowsheet Row Most Recent Value   L Pupil Size (mm) 3   R Pupil Size (mm) 3   L Pupil Shape Round   R Pupil Shape Round          ED Medications  Medications   acetaminophen (TYLENOL) oral suspension 650 mg (has no administration in time range)   cholecalciferol (VITAMIN D3) tablet 1,000 Units (0 Units Per G Tube Hold 9/25/23 1305)   FLUoxetine (PROzac) capsule 10 mg (0 mg Per G Tube Hold 9/25/23 1305)   polyethylene glycol (MIRALAX) packet 17 g (has no administration in time range)   senna oral syrup 17.6 mg (has no administration in time range)   thiamine tablet 100 mg (0 mg Per G Tube Hold 9/25/23 1305)   chlorhexidine (PERIDEX) 0.12 % oral rinse 15 mL (0 mL Mouth/Throat Hold 9/25/23 1304)   acetylcysteine (MUCOMYST) 200 mg/mL inhalation solution 600 mg (600 mg Nebulization Given 9/25/23 1525)   albuterol inhalation solution 2.5 mg (2.5 mg Nebulization Given 9/25/23 1525)   apixaban (ELIQUIS) tablet 5 mg (has no administration in time range)   iohexol (OMNIPAQUE) 350 MG/ML injection (SINGLE-DOSE) 40 mL (40 mL Other Given 9/25/23 1321)       Diagnostic Studies  Results Reviewed     Procedure Component Value Units Date/Time    Lactic acid, plasma (w/reflex if result > 2.0) [[id number]]  (Normal) Collected: 09/24/23 1824    Lab Status: Final result Specimen: Blood from Arm, Left Updated: 09/24/23 1853     LACTIC ACID 1.1 mmol/L     Narrative:      Result may be elevated if tourniquet was used during collection.     Comprehensive metabolic panel [[id number]]  (Abnormal) Collected: 09/24/23 1824    Lab Status: Final result Specimen: Blood from Arm, Left Updated: 09/24/23 1853     Sodium 139 mmol/L      Potassium 4.4 mmol/L      Chloride 99 mmol/L      CO2 32 mmol/L      ANION GAP 8 mmol/L      BUN 15 mg/dL      Creatinine 0.77 mg/dL      Glucose 87 mg/dL      Calcium 9.1 mg/dL      Corrected Calcium 9.7 mg/dL      AST 34 U/L      ALT 79 U/L      Alkaline Phosphatase 74 U/L      Total Protein 7.3 g/dL      Albumin 3.3 g/dL      Total Bilirubin 0.71 mg/dL eGFR 116 ml/min/1.73sq m     Narrative:      [facility] guidelines for Chronic Kidney Disease (CKD):   •  Stage 1 with normal or high GFR (GFR > 90 mL/min/1.73 square meters)  •  Stage 2 Mild CKD (GFR = 60-89 mL/min/1.73 square meters)  •  Stage 3A Moderate CKD (GFR = 45-59 mL/min/1.73 square meters)  •  Stage 3B Moderate CKD (GFR = 30-44 mL/min/1.73 square meters)  •  Stage 4 Severe CKD (GFR = 15-29 mL/min/1.73 square meters)  •  Stage 5 End Stage CKD (GFR <15 mL/min/1.73 square meters)  Note: GFR calculation is accurate only with a steady state creatinine    Magnesium [[id number]]  (Normal) Collected: 09/24/23 1824    Lab Status: Final result Specimen: Blood from Arm, Left Updated: 09/24/23 1853     Magnesium 2.3 mg/dL     CBC and differential [[id number]]  (Abnormal) Collected: 09/24/23 1824    Lab Status: Final result Specimen: Blood from Arm, Left Updated: 09/24/23 1849     WBC 8.63 Thousand/uL      RBC 3.79 Million/uL      Hemoglobin 11.6 g/dL      Hematocrit 37.4 %      MCV 99 fL      MCH 30.6 pg      MCHC 31.0 g/dL      RDW 15.3 %      MPV 10.5 fL      Platelets 688 Thousands/uL      nRBC 0 /100 WBCs      Neutrophils Relative 70 %      Immat GRANS % 1 %      Lymphocytes Relative 20 %      Monocytes Relative 8 %      Eosinophils Relative 1 %      Basophils Relative 0 %      Neutrophils Absolute 6.08 Thousands/µL      Immature Grans Absolute 0.04 Thousand/uL      Lymphocytes Absolute 1.69 Thousands/µL      Monocytes Absolute 0.70 Thousand/µL      Eosinophils Absolute 0.10 Thousand/µL      Basophils Absolute 0.02 Thousands/µL                  IR gastrostomy tube placement   Final Result by Giovanna Key MD (09/25 7096)   Percutaneous gastrostomy catheter placement. PLAN:   The tube will be placed to gravity drainage overnight and after evaluation tomorrow, feedings may begin. Gastropexy anchors should disconnect from the site within 10-14 days.  The catheter should be exchanged every 6 months. Workstation performed: [id number]         CT abdomen pelvis wo contrast   Final Result by Dary Manuel MD (09/24 2049)      Malpositioned PEG tube which terminates in the abdominal wall and needs to be repositioned. Stable or increased lung consolidations in the lower lobes and now also the right middle lobe. While some of this may be atelectasis, pneumonia is not excluded. Correlate for any cough or fever. Correlate for any history of aspiration. Umbilical and left inguinal hernias. Stranding in the subcutaneous fat of the right medial buttocks region may indicate the presence of inflammation. Correlate clinically. Findings communicated to Susanne Anne by secure text at 8:49 p.m. on 9/24/2023            Workstation performed: [id number]         XR abdomen 1 view kub   Final Result by Ashanti Katz MD (09/25 0831)   Impression:      Markedly technically limited examination due to suboptimal patient positioning. Although there is no evidence of contrast leak on this limited study, the percutaneous gastrostomy tube is confirmed to be dislodged and terminates in the abdominal wall on    subsequently performed abdominal CT. Workstation performed: [id number]         IR gastrostomy (G) tube check/change/reinsertion/upsize    (Results Pending)   XR chest portable ICU    (Results Pending)              Procedures  Procedures         ED Course                            Initial Sepsis Screening     Row Name 09/25/23 2006                Is the patient's history suggestive of a new or worsening infection? No  -TT              User Key  (r) = Recorded By, (t) = Taken By, (c) = Cosigned By    [facility] [facility] Name Provider Type    TT [address] Alfonso Jansen, [place] Nurse Practitioner                SBIRT 22yo+    Flowsheet Row Most Recent Value   Initial Alcohol Screen: US AUDIT-C     1.  How often do you have a drink containing alcohol? 0 Filed at: 09/24/2023 1630   2. How many drinks containing alcohol do you have on a typical day you are drinking? 0 Filed at: 09/24/2023 1630   3a. Male UNDER 65: How often do you have five or more drinks on one occasion? 0 Filed at: 09/24/2023 1630   3b. FEMALE Any Age, or MALE 65+: How often do you have 4 or more drinks on one occassion? 0 Filed at: 09/24/2023 1630   Audit-C Score 0 Filed at: 09/24/2023 1630   JEAN MARIE: How many times in the past year have you. .. Used an illegal drug or used a prescription medication for non-medical reasons? Never Filed at: 09/24/2023 1630                    Medical Decision Making  80-year-old male in ED for evaluation of PEG tube malfunction. Patient will be admitted to ICU. Amount and/or Complexity of Data Reviewed  Labs: ordered. Risk  Decision regarding hospitalization. Disposition  Final diagnoses:   PEG tube malfunction ([address])     Time reflects when diagnosis was documented in both MDM as applicable and the Disposition within this note     Time User Action Codes Description Comment    9/24/2023  5:47 PM Bc Sharma Add [K94.23] PEG tube malfunction ([address])     9/24/2023  8:54 PM Pat Spence Add [W27.06] Leaking percutaneous endoscopic gastrostomy (PEG) tube [facility])       ED Disposition     ED Disposition   Admit    Condition   Stable    Date/Time   Sun Sep 24, 2023  5:47 PM    Comment   Case was discussed with Julissa THACKER and the patient's admission status was agreed to be Admission Status: observation status to the service of Dr. David Mckeon .            Follow-up Information    None         Current Discharge Medication List      CONTINUE these medications which have NOT CHANGED    Details   acetaminophen (TYLENOL) 160 mg/5 mL suspension 20.3 mL (650 mg total) by Per G Tube route every 6 (six) hours as needed for mild pain, fever or headaches  Qty: 30 mL, Refills: 0    Associated Diagnoses: Mild pain      albuterol (2.5 mg/3 mL) 0.083 % nebulizer solution Take 3 mL (2.5 mg total) by nebulization every 6 (six) hours as needed for shortness of breath or wheezing  Qty: 60 mL, Refills: 0    Associated Diagnoses: Wheezing      apixaban (ELIQUIS) 5 mg 1 tablet (5 mg total) by Per PEG Tube route 2 (two) times a day  Qty: 60 tablet, Refills: 0    Associated Diagnoses: Acute pulmonary embolism without acute cor pulmonale (HCC)      cholecalciferol (VITAMIN D3) 1,000 units tablet 1 tablet (1,000 Units total) by Per G Tube route daily  Qty: 30 tablet, Refills: 0    Associated Diagnoses: Vitamin D deficiency      FLUoxetine (PROzac) 10 mg capsule 1 capsule (10 mg total) by Per G Tube route daily  Qty: 30 capsule, Refills: 0    Associated Diagnoses: Depression      polyethylene glycol (MIRALAX) 17 g packet 17 g by Per PEG Tube route daily as needed (constipation)  Qty: 10 each, Refills: 0    Associated Diagnoses: Constipation      Sennosides (senna) 8.8 mg/5 mL oral syrup 10 mL (17.6 mg total) by Per PEG Tube route daily as needed (Constipation)  Qty: 236 mL, Refills: 0    Associated Diagnoses: Constipation      thiamine 100 MG tablet 1 tablet (100 mg total) by Per G Tube route daily  Qty: 30 tablet, Refills: 0    Associated Diagnoses: Acute encephalopathy             Outpatient Discharge Orders   IR gastrostomy (G) tube check/change/reinsertion/upsize   Standing Status: Future Standing Exp.  Date: 09/25/27     Alejandra Zhu Home Care Instructions       PDMP Review     None          ED Provider  Electronically Signed by           Rancho Francisco DO  09/25/23 2026

## 2023-09-24 NOTE — ASSESSMENT & PLAN NOTE
Patient initially presented to [facility] on 8/23 with hypoxia and encephalopathy. Acute change in GCS prompted rapid response and subsequent intubation for airway protection. Of note, pt w/ [facility] for encephalopathy with unclear etiology despite extensive workup but Wernicke's encephalopathy was on the differential.     •  [place] workup 8/11:  ? Rapid response and intubation on 8/12 for airway protection, extubated on 8/13  ? CT head 8/11: No acute intracranial abnormality. Stable small bilateral subcortical hypoattenuating foci. No associated mass effect. ? MRI 8/12: No acute intracranial abnormality. Stable nonspecific enhancement along the anterior floor of the third ventricle/hypothalamus compared to March 2023 study. Findings below:  ? VEEG 8/12: negative for seizure activity. Possible findings relating to underlying sleep disorder   ? LP: Grossly negative overall  ? DSPO: Treated with high dose thiamine. Psych evaluated, possible bipolar disorder. Discharged to home with his sister with visiting nurses, ST/PT/OT  •  Mario Jurist 8/23: Presented with hypoxia found by visiting nurse SPO2 in 80's  ? 8/24: RRT > intubated for airway protection   ? Transferred to [facility] on 8/24  • [facility] 8/24-9/23  ? CT head 8/24: No acute intracranial abnormality  ? 8/25 EEG: No evidence of electrographic seizures. Mild background slowing suggestive of underlying cerebral dysfunction from toxic/metablic/infectious/hypoxic encephalopathy. Clinical correlation is recommended  ? Ach binding and blocking Ab negative   ? MUSK Ab and ACHR modulating Ab neg  ? Serum anti-TPO and DARIN Ab's, RPR, neg. CSF paraneoplastic panel neg  ? Varicella PCR neg  ? Toxoplasma antibodies negative  ? Rheum labs negative  ? 9/6 repeat LP cultures negative   ? 9/7 Completed 7 days of Ceftriaxone to cover for possible neurosyphillis  ? MRI 9/9: No acute intracranial pathology.  Unremarkable MRI of the orbits. Improving enhancement of the hypothalamus. 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 ? 9/10 completed 3 days pulse dose steroids  ?  9/11 completed 5 days IVIG  • 9/15 EMG revealed no acute motor neuron disease      Plan:  • F/u outpatient with neurology in 4 weeks ; no clear diagnosis at this time  • PT/OT while admitted  • Would recommend discharge to an acute care facility with daily PT/OT capabilities to optimize patient's recovery

## 2023-09-25 ENCOUNTER — TRANSITIONAL CARE MANAGEMENT (OUTPATIENT)
Dept: FAMILY MEDICINE CLINIC | Facility: CLINIC | Age: 38
End: 2023-09-25

## 2023-09-25 ENCOUNTER — ANESTHESIA EVENT (OUTPATIENT)
Dept: NON INVASIVE DIAGNOSTICS | Facility: HOSPITAL | Age: 38
DRG: 252 | End: 2023-09-25
Payer: COMMERCIAL

## 2023-09-25 ENCOUNTER — PATIENT OUTREACH (OUTPATIENT)
Dept: CASE MANAGEMENT | Facility: OTHER | Age: 38
End: 2023-09-25

## 2023-09-25 ENCOUNTER — HOME CARE VISIT (OUTPATIENT)
Dept: HOME HEALTH SERVICES | Facility: HOME HEALTHCARE | Age: 38
End: 2023-09-25
Payer: COMMERCIAL

## 2023-09-25 ENCOUNTER — APPOINTMENT (OUTPATIENT)
Dept: NON INVASIVE DIAGNOSTICS | Facility: HOSPITAL | Age: 38
DRG: 252 | End: 2023-09-25
Attending: INTERNAL MEDICINE
Payer: COMMERCIAL

## 2023-09-25 DIAGNOSIS — Z71.89 COORDINATION OF COMPLEX CARE: Primary | ICD-10-CM

## 2023-09-25 LAB
ATRIAL RATE: 72 BPM
GLUCOSE SERPL-MCNC: 153 MG/DL (ref 65–140)
GLUCOSE SERPL-MCNC: 92 MG/DL (ref 65–140)
GLUCOSE SERPL-MCNC: 98 MG/DL (ref 65–140)
P AXIS: 91 DEGREES
PR INTERVAL: 176 MS
QRS AXIS: 70 DEGREES
QRSD INTERVAL: 84 MS
QT INTERVAL: 380 MS
QTC INTERVAL: 416 MS
T WAVE AXIS: 8 DEGREES
VENTRICULAR RATE: 72 BPM

## 2023-09-25 PROCEDURE — 94150 VITAL CAPACITY TEST: CPT

## 2023-09-25 PROCEDURE — 82948 REAGENT STRIP/BLOOD GLUCOSE: CPT

## 2023-09-25 PROCEDURE — NC001 PR NO CHARGE: Performed by: INTERNAL MEDICINE

## 2023-09-25 PROCEDURE — 49440 PLACE GASTROSTOMY TUBE PERC: CPT

## 2023-09-25 PROCEDURE — 94760 N-INVAS EAR/PLS OXIMETRY 1: CPT

## 2023-09-25 PROCEDURE — 94003 VENT MGMT INPAT SUBQ DAY: CPT

## 2023-09-25 PROCEDURE — C1769 GUIDE WIRE: HCPCS

## 2023-09-25 PROCEDURE — 94669 MECHANICAL CHEST WALL OSCILL: CPT

## 2023-09-25 PROCEDURE — 93010 ELECTROCARDIOGRAM REPORT: CPT | Performed by: INTERNAL MEDICINE

## 2023-09-25 PROCEDURE — 94640 AIRWAY INHALATION TREATMENT: CPT

## 2023-09-25 PROCEDURE — 49440 PLACE GASTROSTOMY TUBE PERC: CPT | Performed by: INTERNAL MEDICINE

## 2023-09-25 PROCEDURE — C1725 CATH, TRANSLUMIN NON-LASER: HCPCS

## 2023-09-25 PROCEDURE — 99244 OFF/OP CNSLTJ NEW/EST MOD 40: CPT | Performed by: SPECIALIST

## 2023-09-25 PROCEDURE — 99291 CRITICAL CARE FIRST HOUR: CPT | Performed by: STUDENT IN AN ORGANIZED HEALTH CARE EDUCATION/TRAINING PROGRAM

## 2023-09-25 PROCEDURE — 94668 MNPJ CHEST WALL SBSQ: CPT

## 2023-09-25 RX ORDER — SODIUM CHLORIDE 9 MG/ML
INJECTION, SOLUTION INTRAVENOUS CONTINUOUS PRN
Status: DISCONTINUED | OUTPATIENT
Start: 2023-09-25 | End: 2023-09-25

## 2023-09-25 RX ADMIN — CHLORHEXIDINE GLUCONATE 15 ML: 1.2 RINSE ORAL at 21:22

## 2023-09-25 RX ADMIN — ACETYLCYSTEINE 600 MG: 200 SOLUTION ORAL; RESPIRATORY (INHALATION) at 23:00

## 2023-09-25 RX ADMIN — ALBUTEROL SULFATE 2.5 MG: 2.5 SOLUTION RESPIRATORY (INHALATION) at 07:14

## 2023-09-25 RX ADMIN — GLUCAGON 1 MG: 1 INJECTION, POWDER, LYOPHILIZED, FOR SOLUTION INTRAMUSCULAR; INTRAVENOUS at 12:44

## 2023-09-25 RX ADMIN — ALBUTEROL SULFATE 2.5 MG: 2.5 SOLUTION RESPIRATORY (INHALATION) at 23:00

## 2023-09-25 RX ADMIN — SODIUM CHLORIDE: 9 INJECTION, SOLUTION INTRAVENOUS at 12:15

## 2023-09-25 RX ADMIN — IOHEXOL 40 ML: 350 INJECTION, SOLUTION INTRAVENOUS at 13:21

## 2023-09-25 RX ADMIN — ACETYLCYSTEINE 600 MG: 200 SOLUTION ORAL; RESPIRATORY (INHALATION) at 15:25

## 2023-09-25 RX ADMIN — ACETYLCYSTEINE 600 MG: 200 SOLUTION ORAL; RESPIRATORY (INHALATION) at 07:14

## 2023-09-25 RX ADMIN — ALBUTEROL SULFATE 2.5 MG: 2.5 SOLUTION RESPIRATORY (INHALATION) at 15:25

## 2023-09-25 NOTE — PROGRESS NOTES
When PEG is in place and able to be used, recommend Jevity 1.5 at goal rate of 70 mL/hr for a total volume of 1680 mL. This will provide 2520 kcals, 107 gms protein and 1277 mL free water. Recommend flushes of 200 mL q 4 hrs for a total fluid intake of 2477 mL.

## 2023-09-25 NOTE — OCCUPATIONAL THERAPY NOTE
Occupational Therapy Cancellation     Patient Name: Jil Burt  SMLNN'U Date: 9/25/2023  Problem List  Principal Problem:    Migration of percutaneous endoscopic gastrostomy (PEG) tube [facility])  Active Problems:    Umbilical hernia without obstruction and without gangrene    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[address])    History of LVH (left ventricular hypertrophy)    Neuromuscular weakness (HCC)    Depression    Acute pulmonary embolism without acute cor pulmonale (HCC)    Chronic respiratory failure with hypoxia and hypercapnia (HCC)    S/P percutaneous endoscopic gastrostomy (PEG) tube placement ([address])    Status post tracheostomy ([address])    Impaired physical mobility    Multifocal lung consolidation ([address])    Past Medical History  Past Medical History:   Diagnosis Date    Anxiety     Cancer ([address])     Depression     Psychiatric disorder     bipolar     Past Surgical History  Past Surgical History:   Procedure Laterality Date    IR BIOPSY LYMPH NODE  1/20/2023    IR LUMBAR PUNCTURE  9/6/2023    IR PORT PLACEMENT  3/14/2023    ORCHIECTOMY Left 12/14/2022    Procedure: ORCHIECTOMY INGUINAL;  Surgeon: Blanca Iverson MD;  Location: [facility];  Service: Urology    PEG W/TRACHEOSTOMY PLACEMENT N/A 9/8/2023    Procedure: TRACHEOSTOMY WITH INSERTION PEG TUBE;  Surgeon: Nakul Dao MD;  Location: [facility];  Service: General         09/25/23 1159   Note Type   Note type Cancelled Session  (Monday 9/25/23)   Cancel Reasons Patient to operating room   Additional Comments OT orders received and chart review completed. Pt off floor/ going for new PEG tube placement. Will cancel and continue to follow as appropriate following.      Dary Marsh OTR/L  [id number]  [id number]

## 2023-09-25 NOTE — ASSESSMENT & PLAN NOTE
· Brought to ED 9/24 for concern of PEG dislodgement and leaking  · KUB completed, report pending  · CT abdomen completed: The PEG tube is dislodged and no longer within the stomach. It terminates within the abdominal wall and the balloon still appears inflated. There is some haziness within the subcutaneous fat and muscular layer surrounding the site of the tube.   · Holding all medication and TF administration via PEG  · Surgical consult pending for PEG exchange

## 2023-09-25 NOTE — WOUND OSTOMY CARE
Progress Note - Wound   Yael Means 41 y.o. male MRN: [id number]  Unit/Bed#: ICU 08 Encounter: [id number]      Assessment: This is a 41year old male patient readmitted on 9/24/23 with migration of PEG tube. He has a history of right lower lobe pulmonary embolus, HTN, left ventricular hypertrophy, testicular cancer s/p L radical orchiectomy 12/2022, obesity and umbilical hernia. He was received in ICU bed ventilated with endotracheal tube in place. He is totally dependent upon nursing staff for all of his care at this time. He has condom catheter to gravity with large amount of urinary leakage noted with episodes of fecal incontinence. Primary RN present and assisted in giving incontinence care.     Assessment Findings:  1-Healing Stage 3 pressure injury to right sacrobuttock present on admission with pink granulation tissue and yellow slough. Orders in place for wound care and for prevention. 2-Non-intact blister to left dorsal hand and abrasion to left posterior elbow are both healed with no drainage noted - orders in place for skin care. 3-Bilateral heels intact - orders in place for skin care and for prevention. 4-PEG tube was removed and patient will have a new tube inserted today. Plan:   Skin care plans:  1-Cleanse wound to right sacrobuttock with foaming cleanser & pat dry. Apply Triad paste to entire sacrobuttock area in a thin layer 2x/day & prn soilage/dislodgement. 2-Cleanse healed areas to left dorsal hand and left posterior elbow with NSS & pat dry. Apply Hydraguard 2x/day and prn. 3-Hydraguard to bilateral heels BID and PRN  4-Float heels on 2 pillows to offload pressure so heels are not in contact with mattress or pillows. 5-Ehob pressure redistribution cushion in chair when out of bed. Limit sitting for 1-2 hours at a time due to sacrobuttock breakdown then offload back in bed turning side to side every 2 hours.   6-Moisturize skin daily with skin nourishing cream.  7-Turn/reposition q2h or when medically stable for pressure re-distribution on skin.   8-Cleanse PEG peritubular site (once in place) with warm water and gauze daily and pat dry well. Apply split gauze on top of tube and secure with minimal, gentle paper tape - please remove gently. Change daily & prn soilage/dislodgement. Wound 08/11/23 Pressure Injury Buttocks Right (Active)   Wound Image   09/25/23 0958   Wound Description Granulation tissue;Pink;Slough; Yellow 09/25/23 0958   Pressure Injury Stage Stage 3 pressure injury 09/25/23 0958   Donna-wound Assessment Hyperpigmented 09/25/23 0958   Wound Length (cm) 4.5 cm 09/25/23 0958   Wound Width (cm) 4.5 cm 09/25/23 0958   Wound Depth (cm) 0.1 cm 09/25/23 0958   Wound Surface Area (cm^2) 20.25 cm^2 09/25/23 0958   Wound Volume (cm^3) 2.025 cm^3 09/25/23 0958   Calculated Wound Volume (cm^3) 2.03 cm^3 09/25/23 0958   Change in Wound Size % 15.42 09/25/23 0958   Drainage Amount None 09/25/23 0958   Treatments Cleansed 09/25/23 0958   Dressing Triad paste 09/25/23 0958   Dressing Changed New 09/25/23 0958   Dressing Status Intact 09/25/23 0958   Wound 09/24/23 Abrasion(s) Elbow Left;Posterior (Active)   Wound Image   09/25/23 1005   Wound Description Epithelialization 09/25/23 1005   Pressure Injury Stage Please do not stage 09/24/23 1005   Wound Length (cm) 0 cm 09/25/23 1005   Wound Width (cm) 0 cm 09/25/23 1005   Wound Depth (cm) 0 cm 09/25/23 1005   Wound Surface Area (cm^2) 0 cm^2 09/25/23 1005   Wound Volume (cm^3) 0 cm^3 09/25/23 1005   Calculated Wound Volume (cm^3) 0 cm^3 09/25/23 1005   Drainage Amount None 09/25/23 1005   Non-staged Wound Description Not applicable 72/34/80 4704   Treatments Cleansed 09/25/23 1005   Dressing Protective barrier 09/25/23 1005   Dressing Changed New 09/25/23 1005   Dressing Status Intact 09/25/23 1005       Wound 09/24/23 Unroofed blister; Hand Anterior; Left (Active)   Wound Image   09/25/23 1006   Wound Description Epithelialization 09/25/23 1006   Pressure Injury Stage Please do not stage 09/25/23 1006   Wound Length (cm) 0 cm 09/25/23 1006   Wound Width (cm) 0 cm 09/25/23 1006   Wound Depth (cm) 0 cm 09/25/23 1006   Wound Surface Area (cm^2) 0 cm^2 09/25/23 1006   Wound Volume (cm^3) 0 cm^3 09/25/23 1006   Calculated Wound Volume (cm^3) 0 cm^3 09/25/23 1006   Drainage Amount None 09/25/23 1006   Non-staged Wound Description Not applicable 10/21/38 8825   Treatments Cleansed 09/25/23 1006   Dressing Protective barrier 09/25/23 1006   Dressing Changed New 09/25/23 1006   Dressing Status Intact 09/25/23 1006     Discussed assessment findings, and plan of care/recommendations with Adolfo Dubose RN. Wound care will follow along with patient throughout admission, please call or tiger text with questions and concerns. Recommendations written as orders.   Kimberlee Omer MSN, RN, [facility]

## 2023-09-25 NOTE — UTILIZATION REVIEW
Initial Clinical Review    Admission: Date/Time/Statement:   Admission Orders (From admission, onward)     Ordered        09/24/23 1747  Place in Observation  Once                      Orders Placed This Encounter   Procedures   • Place in Observation     Standing Status:   Standing     Number of Occurrences:   1     Order Specific Question:   Level of Care     Answer:   Critical Care [15]     ED Arrival Information     Expected   -    Arrival   9/24/2023 16:25    Acuity   Less Urgent            Means of arrival   Ambulance    Escorted by   [facility] [facility]    Service   Critical Care/ICU    Admission type   Emergency            Arrival complaint   feeding tube problem           Chief Complaint   Patient presents with   • Feeding Tube Problem     Pt arrived EMS from home. EMS reports pt was D/C'd yesterday from [address]. EMS reports that visiting nurse went to home and had a problem with feeding tube. Visiting nurse advised family to send pt back to [facility]. Initial Presentation: 41 y.o. male , presented to the ED @ [facility], from home, via EMS. Admitted as Inpatient due to Leaking percutaneous endoscopic gastrostomy (PEG) tube. 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 Was recently admitted 8/24-9/23 due to altered mental status, hypoxia, and weakness. Initial CTA PE demonstrated right lower lobe segmental PE without evidence of RV strain. He was started on [facility] and stable on nasal cannula. The following day he was emergently intubated for airway protection and hypoxia. He initially required high ventilator settings along with bronchoscopy for LLL mucous plugging. He completed a course of Zosyn for pneumonia and was eventually weaned to minimal vent settings, however, was unable to tolerate PSV, had insufficient NIF, and was unable to be extubated.  He had a trach and PEG placed on 9/8. During his admission, he continued with encephalopathy and weakness. Neurology assisted with an extensive workup essentially without any positive etiology thus far. This included EEG (no seizures) concerning for toxic/metabolic/infections/hypoxic encephalopathy, LP (negative for infection or paraneoplasm), autoimmune labs, and MRI (improving enhancement of hypothalamus). (See further details under encephalopathy plan). He received 3 days of pulse dose steroids and 5 days of IVIG for concern of an autoimmune etiology. Symptoms were not significantly improved. An EMG 9/15 revealed no acute motor neuron disease. He was recommended for acute rehab. He was ultimately discharged to home with visiting nurses yesterday 9/23. Date: 09/24/2023   Hemanth presented back to the ED this afternoon due to PEG site leakage. Per his sister, his tube was connected to TF immediately upon discharge home. Shortly thereafter the PEG was noted to be leaking from the site. Wen Slice denies any other complaints including SOB, CP, fever, chills, or abdominal pain. A KUB was completed but is not yet read. He will have a CT of his abdomen to further evaluate his PEG positioning. On exam, PEG has resistance when attempting to use. There is immediate drainage from the site when flushed. PT/OT. Day 2: 09/25/2023    Currently stable from a respiratory standpoint. Trach collar during day 35%, wean FiO2 goal SpO2 >92. [address] 18/540/40/6 HS to avoid atelectasis, assure normocapnea. Continue Aggressive Pulmonary Hygiene. PT/OT. IV flds. Surgical Consult pending for PEG Exchange. Hold all medication and TF administration via PEG.       INTERVENTIONAL RADIOLOGY PROCEDURE NOTE  Date: 9/25/2023  Procedure: IR GASTROSTOMY TUBE PLACEMENT  Anesthesia: general    ED Triage Vitals   Temperature Pulse Respirations Blood Pressure SpO2   09/24/23 1628 09/24/23 1628 09/24/23 1628 09/24/23 1628 09/24/23 1628   97.5 °F (36.4 °C) 70 20 127/85 100 % Temp Source Heart Rate Source Patient Position - Orthostatic VS BP Location FiO2 (%)   09/24/23 1628 09/24/23 1628 09/24/23 1628 09/24/23 1628 09/24/23 1628   Oral Monitor Sitting Right arm 28      Pain Score       09/24/23 1855       No Pain          Wt Readings from Last 1 Encounters:   09/25/23 131 kg (289 lb 7.4 oz)     Additional Vital Signs:   Date/Time Temp Pulse Resp BP MAP (mmHg) SpO2 FiO2 (%) O2 Flow Rate (L/min) O2 Device Patient Position - Orthostatic VS   09/25/23 0737 -- -- -- -- -- 91 % 40 5 L/min Trach mask --   09/25/23 0714 -- -- -- -- -- 97 % -- -- -- --   09/25/23 0600 -- 79 18 108/60 75 98 % -- -- -- --   09/25/23 0500 -- 76 18 -- -- 97 % -- -- -- --   09/25/23 0400 97.6 °F (36.4 °C) 60 18 104/63 79 98 % -- -- -- Lying   09/25/23 0306 -- 73 18 -- -- 96 % 40 -- Ventilator --   09/25/23 0200 -- 68 18 97/58 72 96 % -- -- -- --   09/25/23 0100 -- 69 18 -- -- 95 % -- -- -- --   09/25/23 0000 97.8 °F (36.6 °C) 74 18 103/60 75 96 % 40 -- Ventilator Lying   09/24/23 2318 -- 72 18 -- -- 96 % 40 -- Ventilator --   09/24/23 2300 -- 67 18 -- -- 96 % -- -- -- --   09/24/23 2200 -- 66 18 101/66 79 96 % -- -- -- --   09/24/23 2100 -- 70 18 -- -- 96 % -- -- -- --   09/24/23 2052 -- 71 18 -- -- 97 % 40 -- Ventilator  --   O2 Device: /18/6.0 at 09/24/23 2052 09/24/23 2000 97.5 °F (36.4 °C) 67 38 Abnormal  104/62 77 95 % 28 5 L/min Trach mask Lying   09/24/23 1900 -- 72 21 118/78 93 96 % -- -- -- --   09/24/23 1855 97.6 °F (36.4 °C) 70 20 118/78 93 96 % 28 -- Trach mask Lying   09/24/23 1800 -- 66 -- 122/74 93 98 % -- -- -- --     Date and Time Eye Opening Best Verbal Response Best Motor Response Montara Coma Scale Score   09/25/23 0400 4 1 6 11   09/25/23 0000 4 1 6 11   09/24/23 2000 4 1 6 11           Pertinent Labs/Diagnostic Test Results:   CT abdomen pelvis wo contrast   Final Result by Sd House MD (09/24 2049)   Malpositioned PEG tube which terminates in the abdominal wall and needs to be repositioned. Stable or increased lung consolidations in the lower lobes and now also the right middle lobe. While some of this may be atelectasis, pneumonia is not excluded. Correlate for any cough or fever. Correlate for any history of aspiration. Umbilical and left inguinal hernias. Stranding in the subcutaneous fat of the right medial buttocks region may indicate the presence of inflammation. Correlate clinically. XR abdomen 1 view kub   Final Result by Zena Kayser, MD (09/25 0831)   Impression:   Markedly technically limited examination due to suboptimal patient positioning. Although there is no evidence of contrast leak on this limited study, the percutaneous gastrostomy tube is confirmed to be dislodged and terminates in the abdominal wall on    subsequently performed abdominal CT.         Results from last 7 days   Lab Units 09/24/23 1824 09/20/23  0518   WBC Thousand/uL 8.63 9.55   HEMOGLOBIN g/dL 11.6* 11.0*   HEMATOCRIT % 37.4 34.8*   PLATELETS Thousands/uL 247 239   NEUTROS ABS Thousands/µL 6.08  --      Results from last 7 days   Lab Units 09/24/23 1824 09/20/23  0518   SODIUM mmol/L 139 140   POTASSIUM mmol/L 4.4 3.7   CHLORIDE mmol/L 99 100   CO2 mmol/L 32 36*   ANION GAP mmol/L 8 4   BUN mg/dL 15 16   CREATININE mg/dL 0.77 0.71   EGFR ml/min/1.73sq m 116 120   CALCIUM mg/dL 9.1 9.1   MAGNESIUM mg/dL 2.3 2.3     Results from last 7 days   Lab Units 09/24/23 1824 09/19/23  0623   AST U/L 34  --    ALT U/L 79*  --    ALK PHOS U/L 74  --    TOTAL PROTEIN g/dL 7.3 7.5   ALBUMIN g/dL 3.3*  --    TOTAL BILIRUBIN mg/dL 0.71  --      Results from last 7 days   Lab Units 09/24/23 1824 09/20/23  0518   GLUCOSE RANDOM mg/dL 87 96      Results from last 7 days   Lab Units 09/23/23  0733 09/22/23  0441 09/21/23  0943   PH ART  7.534* 7.427 7.414   PCO2 ART mm Hg 39.3 55.1* 54.9*   PO2 ART mm Hg 74.0* 77.7 72.4*   HCO3 ART mmol/L 32.4* 35.5* 34.3*   BASE EXC ART mmol/L 9.1 9.4 8.2   O2 CONTENT ART mL/dL 15.4* 16.7 15.8*   O2 HGB, ARTERIAL % 94.6 94.0 93.6*   ABG SOURCE  Radial, Right Radial, Right Radial, Right     Results from last 7 days   Lab Units 09/24/23  1824   LACTIC ACID mmol/L 1.1     ED Treatment:   Medication Administration from 09/24/2023 1625 to 09/24/2023 1848     None        Past Medical History:   Diagnosis Date   • Anxiety    • Cancer [facility])    • Depression    • Psychiatric disorder     bipolar     Present on Admission:  • Chronic respiratory failure with hypoxia and hypercapnia (HCC)  • Acute pulmonary embolism without acute cor pulmonale (HCC)  • History of LVH (left ventricular hypertrophy)  • Depression  • 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HCC)  • Umbilical hernia without obstruction and without gangrene  • Migration of percutaneous endoscopic gastrostomy (PEG) tube (HCC)  • Neuromuscular weakness (HCC)  • Impaired physical mobility  • Multifocal lung consolidation (HCC)      Admitting Diagnosis: PEG tube malfunction ([facility]) [K94.23]  Feeding tube dysfunction [T85.598A]  Age/Sex: 41 y.o. male  Admission Orders:  NPO / Oral Care  OOB to Chair / Early Mobilization / Fall Precautions  Change Oral suction equipment daily  Tracheostomy suction PRN  Cardio-Pulmonary monitoring  Daily weight / I&O q2h  Neuro checks q4h  Vent  q HS    FiO2 40%,   PT/OT  Adan SCDs  Elevate HOB    Scheduled Medications:  acetylcysteine, 3 mL, Nebulization, Q8H  albuterol, 2.5 mg, Nebulization, Q8H  chlorhexidine, 15 mL, Mouth/Throat, Q12H [address]  cholecalciferol, 1,000 Units, Per G Tube, Daily  FLUoxetine, 10 mg, Per G Tube, Daily  thiamine, 100 mg, Per G Tube, Daily      Continuous IV Infusions:  multi-electrolyte, 50 mL/hr, Intravenous, Continuous      PRN Meds:  acetaminophen, 650 mg, Per G Tube, Q6H PRN  polyethylene glycol, 17 g, Per PEG Tube, Daily PRN  senna, 17.6 mg, Per PEG Tube, Daily PRN        IP CONSULT TO CASE MANAGEMENT  IP CONSULT TO ACUTE [facility] SURGERY    Network Utilization Review Department  ATTENTION: Please call with any questions or concerns to [phone] and carefully listen to the prompts so that you are directed to the right person. All voicemails are confidential.  Lanza Going all requests for admission clinical reviews, approved or denied determinations and any other requests to dedicated fax number below belonging to the campus where the patient is receiving treatment.  List of dedicated fax numbers for the Facilities:  [place] DENIALS (Administrative/Medical Necessity) [phone]   [address] [facility] (Maternity/NICU/Pediatrics) [phone]   [address] [place] [address] [phone]   [facility] [address] [facility] [phone]   [address] [place] [address] [address] [phone]   [address] [facility] [address] [facility]  [facility] [phone]

## 2023-09-25 NOTE — CONSULTS
Consultation - General Surgery   Keily Fay 41 y.o. male MRN: [id number]  Unit/Bed#: ICU 08 Encounter: [id number]    Assessment/Plan     Assessment:  1) Dislodged PEG tube -CAT scan reveals PEG tube is not in the intraluminal space of the stomach rather in the adipose tissue, no abdominal pain, no free air on CAT scan, suspected to have taken place over the course of possibly 36 hours, AVSS, appropriate urinary output, refluxing tube feeds when attempting to feed through the PEG tube    Plan:  1)   -Unfortunately unsuccessful on multiple attempts with 16 [place] and 14 [place] catheters respectively to place back into the stomach  -Spoke with IR for new PEG tube placement and coordinating for today  -Stop any PEG tube feeds for now  -Continue tracheostomy tube management per critical care team and respiratory therapy as well as ventilation settings  - Reviewed CAT scan  -Continue home medications to the best of the critical care's abilities based on the fact that they need to be converted over to intravenous since we have no p.o. access currently  -Turn patient every 2 hours  -Foam wedges  -P500 bed   - monitor for any sort of wound development On pressure related areas      History of Present Illness   HPI:  Keily Fay is a 41 y.o. male with a past medical history pertinent for tracheostomy tube and PEG tube placement within the last month, recent critical care extended stay presenting to the acute care surgery team secondary to the fact that upon discharge Saturday 9/23/2023 patient had PEG tube feeds restarted when arriving home there is having refluxing of PEG tube feeds. Patient  is intubated and incapable of relating history. Per the nursing despite their efforts of trying to clear the tube many obstruction and tests the PEG tube functionality there is no improvement in the PEG tube patency.   When attempting to administer Jevity 1.2 kcal per continue to be refluxing of the contents surrounding the incision where the PEG tube was placed. Patient does not have any abdominal pain or any distention. CAT scan revealed that the PEG tube is in incorrect positioning in the adipose tissue of the abdominal wall. Inpatient consult to Acute Care Surgery  Consult performed by: Renard Duenas PA-C  Consult ordered by: AMBER Betts          Review of Systems   Unable to perform ROS: Intubated   Respiratory: Negative for shortness of breath. Gastrointestinal: Negative for abdominal pain. Skin: Positive for wound. Negative for color change.        Historical Information   Past Medical History:   Diagnosis Date   • Anxiety    • Cancer [facility])    • Depression    • Psychiatric disorder     bipolar     Past Surgical History:   Procedure Laterality Date   • IR BIOPSY LYMPH NODE  1/20/2023   • IR LUMBAR PUNCTURE  9/6/2023   • IR PORT PLACEMENT  3/14/2023   • ORCHIECTOMY Left 12/14/2022    Procedure: ORCHIECTOMY INGUINAL;  Surgeon: Biju Hawthorne MD;  Location:  MAIN OR;  Service: Urology   • PEG W/TRACHEOSTOMY PLACEMENT N/A 9/8/2023    Procedure: TRACHEOSTOMY WITH INSERTION PEG TUBE;  Surgeon: Kirk Valverde MD;  Location: [facility] MAIN OR;  Service: General     Social History   Social History     Substance and Sexual Activity   Alcohol Use Not Currently    Comment: 2 cases of beer daily, stopped 3 years ago     Social History     Substance and Sexual Activity   Drug Use Yes   • Types: Marijuana    Comment: Medical marijuana     E-Cigarette/Vaping   • E-Cigarette Use Never User      E-Cigarette/Vaping Substances   • Nicotine No    • THC Yes    • CBD No    • Flavoring No    • Other No    • Unknown No      Social History     Tobacco Use   Smoking Status Former   • Packs/day: 0.50   • Years: 5.00   • Total pack years: 2.50   • Types: Cigarettes   • Start date: 1/1/2008   Smokeless Tobacco Never     Family History: non-contributory    Meds/Allergies   all current active meds have been reviewed and current meds: Current Facility-Administered Medications   Medication Dose Route Frequency   • acetaminophen (TYLENOL) oral suspension 650 mg  650 mg Per G Tube Q6H PRN   • acetylcysteine (MUCOMYST) 200 mg/mL inhalation solution 600 mg  3 mL Nebulization Q8H   • albuterol inhalation solution 2.5 mg  2.5 mg Nebulization Q8H   • chlorhexidine (PERIDEX) 0.12 % oral rinse 15 mL  15 mL Mouth/Throat Q12H [address]   • cholecalciferol (VITAMIN D3) tablet 1,000 Units  1,000 Units Per G Tube Daily   • FLUoxetine (PROzac) capsule 10 mg  10 mg Per G Tube Daily   • multi-electrolyte (PLASMALYTE-A/ISOLYTE-S PH 7.4) IV solution  50 mL/hr Intravenous Continuous   • polyethylene glycol (MIRALAX) packet 17 g  17 g Per PEG Tube Daily PRN   • senna oral syrup 17.6 mg  17.6 mg Per PEG Tube Daily PRN   • thiamine tablet 100 mg  100 mg Per G Tube Daily     No Known Allergies    Objective   First Vitals:   Blood Pressure: 127/85 (09/24/23 1628)  Pulse: 70 (09/24/23 1628)  Temperature: 97.5 °F (36.4 °C) (09/24/23 1628)  Temp Source: Oral (09/24/23 1628)  Respirations: 20 (09/24/23 1628)  Height: 6' 4" (193 cm) (09/24/23 1628)  Weight - Scale: 129 kg (285 lb 7.9 oz) (09/24/23 1855)  SpO2: 100 % (09/24/23 1628)    Current Vitals:   Blood Pressure: 108/60 (09/25/23 0600)  Pulse: 79 (09/25/23 0600)  Temperature: 97.6 °F (36.4 °C) (09/25/23 0400)  Temp Source: Temporal (09/25/23 0400)  Respirations: 18 (09/25/23 0600)  Height: 6' 4" (193 cm) (09/24/23 1855)  Weight - Scale: 131 kg (289 lb 7.4 oz) (09/25/23 0500)  SpO2: 91 % (09/25/23 0737)      Intake/Output Summary (Last 24 hours) at 9/25/2023 0959  Last data filed at 9/25/2023 0531  Gross per 24 hour   Intake 20 ml   Output 300 ml   Net -280 ml       Invasive Devices     Central Venous Catheter Line  Duration           Port A Cath Right -- days          Peripheral Intravenous Line  Duration           Peripheral IV 09/24/23 Left;Ventral (anterior) Hand <1 day    Peripheral IV 09/24/23 Right Antecubital <1 day Drain  Duration           Gastrostomy/Enterostomy PEG- Jejunostomy 20 Fr. LUQ 16 days    External Urinary Catheter Small <1 day          Airway  Duration           Surgical Airway Shiley Cuffed 16 days                Physical Exam  Constitutional:       General: He is awake. He is not in acute distress. Appearance: Normal appearance. He is overweight. He is not ill-appearing, toxic-appearing or diaphoretic. Interventions: He is intubated (tracheostomy tube in place without and breathing with tarach collar in place). HENT:      Head: Normocephalic and atraumatic. Right Ear: Hearing and external ear normal.      Left Ear: Hearing and external ear normal.      Nose: Nose normal.   Cardiovascular:      Rate and Rhythm: Normal rate. Pulmonary:      Effort: Pulmonary effort is normal. No tachypnea, bradypnea, accessory muscle usage, prolonged expiration, respiratory distress or retractions. He is intubated (tracheostomy tube in place without and breathing with tarach collar in place). Abdominal:      General: Abdomen is flat. There is no distension. Palpations: Abdomen is soft. Tenderness: There is no abdominal tenderness. There is no guarding. Skin:     General: Skin is warm and dry. Capillary Refill: Capillary refill takes less than 2 seconds. Psychiatric:         Behavior: Behavior is cooperative. Lab Results:   I have personally reviewed pertinent lab results.   , CBC:   Lab Results   Component Value Date    WBC 8.63 09/24/2023    HGB 11.6 (L) 09/24/2023    HCT 37.4 09/24/2023    MCV 99 (H) 09/24/2023     09/24/2023    RBC 3.79 (L) 09/24/2023    MCH 30.6 09/24/2023    MCHC 31.0 (L) 09/24/2023    RDW 15.3 (H) 09/24/2023    MPV 10.5 09/24/2023    NRBC 0 09/24/2023   , CMP:   Lab Results   Component Value Date    SODIUM 139 09/24/2023    K 4.4 09/24/2023    CL 99 09/24/2023    CO2 32 09/24/2023    BUN 15 09/24/2023    CREATININE 0.77 09/24/2023 CALCIUM 9.1 09/24/2023    AST 34 09/24/2023    ALT 79 (H) 09/24/2023    ALKPHOS 74 09/24/2023    EGFR 116 09/24/2023     Imaging: I have personally reviewed pertinent reports. EKG, Pathology, and Other Studies: I have personally reviewed pertinent reports. Counseling / Coordination of Care  Total floor / unit time spent today 45 minutes. Greater than 50% of total time was spent with the patient and / or family counseling and / or coordination of care. A description of the counseling / coordination of care: Developing plan, reviewing attending, cording care, physical exam, history taking, reviewing labs, reviewing imaging, attempts at placing Sheppard catheter into granulated track.

## 2023-09-25 NOTE — BRIEF OP NOTE (RAD/CATH)
INTERVENTIONAL RADIOLOGY PROCEDURE NOTE    Date: 9/25/2023    Procedure:   Procedure Summary     Date: 09/25/23 Room / Location: [address] Cardiac Cath Lab    Anesthesia Start: 5856 Anesthesia Stop:     Procedure: IR GASTROSTOMY TUBE PLACEMENT Diagnosis: (recent surgical G tube dislodgement.)    Scheduled Providers:  Responsible Provider: Kaleb Ya MD    Anesthesia Type: IV sedation with anesthesia ASA Status: 4          Preoperative diagnosis:   1. PEG tube malfunction ([address])    2. Leaking percutaneous endoscopic gastrostomy (PEG) tube ([facility])         Postoperative diagnosis: Same. Surgeon: Lynn Moore MD     Assistant: None. No qualified resident was available. Blood loss: Minimal    Specimens: None     Findings:     Fluoro guided 18F gastrostomy tube placement. Complications: None immediate.     Anesthesia: general

## 2023-09-25 NOTE — RESPIRATORY THERAPY NOTE
RT Ventilator Management Note  Jil Burt 41 y.o. male MRN: [id number]  Unit/Bed#: ICU 08 Encounter: [id number]      Daily Screen    No data found in the last 10 encounters.            Physical Exam:   Assessment Type: Assess only  General Appearance: Awake  Respiratory Pattern: Normal  Chest Assessment: Chest expansion symmetrical  Bilateral Breath Sounds: Clear, Diminished  Suction: ET Tube  O2 Device: vent      Resp Comments: patient placed on vent for HS

## 2023-09-25 NOTE — ANESTHESIA POSTPROCEDURE EVALUATION
Post-Op Assessment Note    CV Status:  Stable  Pain Score: 0    Pain management: adequate     Mental Status:  Sleepy   Hydration Status:  Euvolemic and stable   PONV Controlled:  Controlled   Airway Patency:  Patent      Post Op Vitals Reviewed: Yes      Staff: CRNA   Comments: Pt with trach and ambu bag transported back to ICU, Transfer approved by RN Claude Shaver on ICU/Steppdown. Transported without incident. Report given to Claude Shaver at patient's bedside. All questions and concerns addressed at this time. No notable events documented.     BP   99/54   Temp      Pulse 68   Resp 16   SpO2 94 % (09/25/23 1323)

## 2023-09-25 NOTE — PROGRESS NOTES
In basket message received with a referral from HRR report. Patient is currently hospitalized at [address] with migration of percutaneous Peg tube. Will follow at discharge.

## 2023-09-25 NOTE — DISCHARGE INSTR - OTHER ORDERS
Plan:   Skin care plans:    1-Cleanse wound to right sacrobuttock with foaming cleanser & pat dry. Apply Triad paste to entire sacrobuttock area in a thin layer 2x/day & as needed for soilage/dislodgement. 2-Cleanse healed areas to left dorsal hand and left posterior elbow with soap and water & pat dry. Apply Hydraguard barrier cream 2x/day and prn. 3-Hydraguard to bilateral heels 2x/day and as needed. 4-Float heels on 2 pillows to offload pressure so heels are not in contact with mattress or pillows. 5-Use pressure redistribution cushion in chair when out of bed. Limit sitting for 1-2 hours at a time due to sacrobuttock breakdown then offload back in bed turning side to side every 2 hours. 6-Moisturize skin daily with skin nourishing cream.  7-Turn/reposition every 2 hrs for pressure re-distribution on skin. 8-Cleanse feeding tube insertion site with warm water and gauze daily and pat dry well. Apply split gauze on top of tube and secure with minimal, gentle paper tape - please remove gently. Change daily & as needed for soilage/dislodgement.

## 2023-09-25 NOTE — PROGRESS NOTES
[address]  Progress Note  Name: Madina Williamson  MRN: [id number]  Unit/Bed#: ICU 08 I Date of Admission: 9/24/2023   Date of Service: 9/25/2023 I Hospital Day: 0    Assessment/Plan   * Migration of percutaneous endoscopic gastrostomy (PEG) tube [facility])  Assessment & Plan  · Brought to ED 9/24 for concern of PEG dislodgement and leaking  · KUB completed, report pending  · CT abdomen completed: The PEG tube is dislodged and no longer within the stomach. It terminates within the abdominal wall and the balloon still appears inflated. There is some haziness within the subcutaneous fat and muscular layer surrounding the site of the tube. · Holding all medication and TF administration via PEG  · Surgical consult pending for PEG exchange    Chronic respiratory failure with hypoxia and hypercapnia ([address])  Assessment & Plan    Recently admitted for acute respiratory failure with notable following events:  • 8/12-8/13: intubated for airway protection when acutely had GCS of 5 at [facility]  • 8/23 CTA PE study: PE in the right lower lobe segmental pulmonary artery  • 8/24 RRT > intubated for hypoxia + airway protection   • 8/25: Bronch: Some mucus plugging present on the left. • 8/26: CT chest: Complete right lower lobe and near complete left lower lobe atelectasis + edema  • 8/31: Completed Zosyn x 7 days   • 9/8: Cyrilla [place] placed        Plan:    • Currently stable from a respiratory standpoint  • Trach collar during day 35%, wean FiO2 goal SpO2 >92  • ACVC 18/540/40/6 HS to avoid atelectasis, assure normocapnea  • Follow up with Dr. Rogerio Steward (pulm) outpatient for f/u with home vent/trach management, vs continuation of care at [facility] given complications experienced at home after discharge yesterday  • CT abdomen 9/24 noted "Stable or increased lung consolidations in the lower lobes and now also the right middle lobe. While some of this may be atelectasis, pneumonia is not excluded.  Correlate for any cough CHIEF COMPLAINT:   had concerns including Rash (both thumbs).      HISTORY OF PRESENT ILLNESS:   Justa Hewitt is a 36 year old female presenting with rash on both thumbs,     Blister rash  B/l thumbs with blisters that she has been puncturing. Started on Tuesday, does not recall any new exposure.  started as redness, then started itching, blisters then began to form within 2 hours of itching, mild stingling pain tried hydrocortisone cream, rx triamcinolone cream both with minimal help. It started on the right thumb and then the left, she's noted no other rash anywhere else no shortness of breath no chest pains no dizziness    I have reviewed the medications and allergies listed in the medical record as obtained by my nursing staff and support staff and agree with their documentation.    Social history: Patient is a nonsmoker.    REVIEW OF SYSTEMS:     Please refer to hpi for pertinent negative and positive review of systems.       PHYSICAL EXAMINATION:   Blood pressure 122/80, pulse 100, weight 123 kg, last menstrual period 06/27/2019, SpO2 97 %.    General:   Alert and oriented ×3, in no acute distress, pleasant and well-groomed, cooperative, conversive  Mouth: Moist mucous membranes, no erythema or exudate of posterior pharynx, good dentition, no lesions noted  Neuro: Deep tendon reflexes 2 out of 4 bilateral upper and lower extremities at biceps and knee, cranial nerves II through XII intact bilaterally grossly  Skin:  Warm and dry and intact , positive for vesicles with clear fluid on a erythematous patch primarily on the anterior aspect of thumb bilaterally, no rash or other lesions on the rest of her skin that is similar. Does have linear raised lines on her left arm from a blood pressure cuff   Psychiatric:   Cooperative.  Appropriate  affect. Slightly anxious         ASSESSMENT and PLAN:   Patient is a pleasant 36 year old female presenting to establish care.    1. Dermatitis  Prednisone orally to calm  lesions down fluticasone topically, discussed side effects prednisone patient will call things are worsening or not improving  - triamcinolone (ARISTOCORT) 0.1 % cream; Apply thin film up to b.i.d. to affected skin until resolution, no longer than 2 weeks in a row  Dispense: 90 g; Refill: 1  - fluticasone (CUTIVATE) 0.005 % ointment; Apply topically 3 times daily as needed (rash).  Dispense: 30 g; Refill: 0  - predniSONE (DELTASONE) 20 MG tablet; 2 tablets once daily with food for 5 days  Dispense: 10 tablet; Refill: 0    2. Chronic urticaria  Worsening with tomographic skin that is worsening even mild pressure recommend dermatology referral  - triamcinolone (ARISTOCORT) 0.1 % cream; Apply thin film up to b.i.d. to affected skin until resolution, no longer than 2 weeks in a row  Dispense: 90 g; Refill: 1          The patient indicated and verbalized understanding of the diagnosis and agreed with the plan of care. Will return to clinic for any new or worsening symptoms otherwise at regularly scheduled appointment.       No follow-ups on file.       or fever. Correlate for any history of aspiration."  • No fever, leukocytosis, or increased sputum. See lung consolidation as outlined  • Continue aggressive pulmonary hygiene          Acute pulmonary embolism without acute cor pulmonale (HCC)  Assessment & Plan    · Eliquis on hold given malpositioned PEG  · Hold AC pending PEG exchange vs. new site    Neuromuscular weakness [facility])  Assessment & Plan    Patient initially presented to [facility] on 8/23 with hypoxia and encephalopathy. Acute change in GCS prompted rapid response and subsequent intubation for airway protection. Of note, pt w/ recent hospitalization to [facility] 8/11-8/17 for encephalopathy with unclear etiology despite extensive workup but Wernicke's encephalopathy was on the differential.     •  [place] workup 8/11:  ? Rapid response and intubation on 8/12 for airway protection, extubated on 8/13  ? CT head 8/11: No acute intracranial abnormality. Stable small bilateral subcortical hypoattenuating foci. No associated mass effect. ? MRI 8/12: No acute intracranial abnormality. Stable nonspecific enhancement along the anterior floor of the third ventricle/hypothalamus compared to March 2023 study. Findings below:  ? VEEG 8/12: negative for seizure activity. Possible findings relating to underlying sleep disorder   ? LP: Grossly negative overall  ? DSPO: Treated with high dose thiamine. Psych evaluated, possible bipolar disorder. Discharged to home with his sister with visiting nurses, ST/PT/OT  • [facility] 8/23: Presented with hypoxia found by visiting nurse SPO2 in 80's  ? 8/24: RRT > intubated for airway protection   ? Transferred to [facility] on 8/24  •  [address] 8/24-9/23  ? CT head 8/24: No acute intracranial abnormality  ? 8/25 EEG: No evidence of electrographic seizures. Mild background slowing suggestive of underlying cerebral dysfunction from toxic/metablic/infectious/hypoxic encephalopathy. Clinical correlation is recommended  ?  Ach binding and blocking Ab negative   ? MUSK Ab and ACHR modulating Ab neg  ? Serum anti-TPO and DARIN Ab's, RPR, neg. CSF paraneoplastic panel neg  ? Varicella PCR neg  ? Toxoplasma antibodies negative  ? Rheum labs negative  ? 9/6 repeat LP cultures negative   ? 9/7 Completed 7 days of Ceftriaxone to cover for possible neurosyphillis  ? MRI 9/9: No acute intracranial pathology. Unremarkable MRI of the orbits. Improving enhancement of the hypothalamus. 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 ? 9/10 completed 3 days pulse dose steroids  ? 9/11 completed 5 days IVIG  • 9/15 EMG revealed no acute motor neuron disease      Plan:  • F/u outpatient with neurology in 4 weeks ; no clear diagnosis at this time  • PT/OT while admitted  • Would recommend discharge to an acute care facility with daily PT/OT capabilities to optimize patient's recovery       Impaired physical mobility  Assessment & Plan  · Required ceiling lift for mobility while hospitalized, as well as aggressive PT/OT for his deconditioned status  · Was discharged yesterday to home with visiting nurses and per his sister, has not yet received/been set up for PT/OT per her knowledge. Unfortunately his mobilization chair has not yet been delivered to his home, and he has remained in bed since discharge. · There is concern for insufficient resources at his home, and he is at high risk for skin breakdown given his inability to offload pressure.  Would recommend interim discharge to an acute care rehab where he has access to more aggressive PT/OT and lift equipment until he is able to regain some strength and mobility  · PT/OT consulted  · Case Management for assistance with discharge    Multifocal lung consolidation [facility])  Assessment & Plan  · Stable/increased lung consolidations in lower lobes and RML seen on CT imaging 9/24  · Respiratory status unchanged/stable from discharge 9/23  · Does not appear actively infected, monitor off of antibiotics  · Mucomyst/albuterol nebs q8h for mucokinesis  · Chest PT, VAP precautions  · Trach suctioning PRN  · Continue nightly ACVC for atelectasis, wean to TC daily  · Trend temperature curve, WBC        Status post tracheostomy [facility])  Assessment & Plan    • Trach care  • Trach sutures removed 9/23         S/P percutaneous endoscopic gastrostomy (PEG) tube placement [facility])  Assessment & Plan    • PEG tube placed 9/8. Had been tolerating tube feeds at goal prior to discharge yesterday  • Now admitted with leaking PEG, see plan as outlined above      History of LVH (left ventricular hypertrophy)  Assessment & Plan    PTA Med: Torsemide 20 mg daily  • Remains off torsemide          Depression  Assessment & Plan    • Fluoxetine 10 mg daily      Seminoma of left testis [facility])  Assessment & Plan    • Testicular cancer 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 • Began Chemo in March 2023: Etoposide and Cisplatin with plan for 4 cycles, 5 days every 21 days  • Did not complete chemo treatment due to adverse effects  ? After 3rd cycle reported double vision, labile affect and right sided weakness and therefore chemotherapy was stopped on 5/26/23  • Outpatient follow up with Heme/Onc       Umbilical hernia without obstruction and without gangrene  Assessment & Plan    • Umbilical hernia present, easily reducible              ICU Core Measures     Vented Patient  VAP Bundle  VAP bundle ordered     A: Assess, Prevent, and Manage Pain · Has pain been assessed? Yes  · Need for changes to pain regimen? No   B: Both Spontaneous Awakening Trials (SATs) and Spontaneous Breathing Trials (SBTs) · Plan to perform spontaneous awakening trial today? N/A   · Plan to perform spontaneous breathing trial today? N/A   · Obvious barriers to extubation? NA   C: Choice of Sedation · RASS Goal: N/A patient not on sedation  · Need for changes to sedation or analgesia regimen?  NA   D: Delirium · CAM-ICU: Negative   E: Early Mobility  · Plan for early mobility? Yes   F: Family Engagement · Plan for family engagement today? Yes       Review of Invasive Devices:      Central access plan: R. SQ port in place, not currently accessed      Prophylaxis:  VTE VTE covered by:  apixaban, Per PEG Tube       Stress Ulcer  not ordered       Subjective    Nods head "no" to pain or difficulty breathing. Nods "no" to abdominal pain. HPI/24hr events: Admitted yesterday from home in setting of leaking PEG. CT imaging demonstrates malpositioned PEG. Tube clamped and not in use currently. Pending surgery consult for replacement of tube. Has remained stable from a hemodynamic and respiratory standpoint since arrival. No acute events overnight. Review of Systems   Unable to perform ROS: Other (Limited subjective data in setting of tracheostomy. Denies any pain or SOB. )   Respiratory: Negative for shortness of breath. Gastrointestinal: Negative for abdominal pain. Objective                            Vitals I/O      Most Recent Min/Max in 24hrs   Temp 97.6 °F (36.4 °C) Temp  Min: 97.5 °F (36.4 °C)  Max: 98.7 °F (37.1 °C)   Pulse 76 Pulse  Min: 60  Max: 87   Resp 18 Resp  Min: 18  Max: 38   /63 BP  Min: 97/58  Max: 128/70   O2 Sat 97 % SpO2  Min: 95 %  Max: 100 %      Intake/Output Summary (Last 24 hours) at 9/25/2023 0536  Last data filed at 9/25/2023 0531  Gross per 24 hour   Intake 20 ml   Output 300 ml   Net -280 ml         Diet NPO     Invasive Monitoring Physical exam   N/A Physical Exam  Eyes:      General: Lids are normal.   Skin:     General: Skin is warm and dry. HENT:      Head: Normocephalic. Mouth/Throat:      Mouth: Mucous membranes are moist.   Cardiovascular:      Rate and Rhythm: Normal rate and regular rhythm. Pulses:           Radial pulses are 2+ on the right side and 2+ on the left side.         Dorsalis pedis pulses are 2+ on the right side and 2+ on the left side.      Heart sounds: Normal heart sounds. Musculoskeletal:      Right lower leg: No edema. Left lower leg: No edema. Abdominal:      General: Abdomen is protuberant. Bowel sounds are normal.      Palpations: Abdomen is soft. Tenderness: There is no abdominal tenderness. Constitutional:       General: He is not in acute distress. Appearance: He is ill-appearing. Comments: On vent via trach overnight. Pulmonary:      Effort: Pulmonary effort is normal. No accessory muscle usage, respiratory distress or accessory muscle usage. Breath sounds: Decreased breath sounds and rhonchi (scattered ronchi b/l. Able to cough up thick tan sputum via tracheostomy) present. Psychiatric:         Attention and Perception: Attention normal.         Mood and Affect: Affect is flat. Neurological:      Mental Status: He is alert. GCS: GCS eye subscore is 4. GCS verbal subscore is 1. GCS motor subscore is 6. Motor: Weakness (generalized weakness. 3/5 strength of all extremities. Able to slowly lift all extremities against gravity). Genitourinary/Anorectal:     Comments: External catheter draining clear yellow urine  external catheterVitals and nursing note reviewed. Diagnostic Studies      EKG: NSR on monitor, rate 60-70s  Imagin/24 KUB: Report pending.  CT abdomen/pelvis without contrast:    IMPRESSION:     Malpositioned PEG tube which terminates in the abdominal wall and needs to be repositioned.     Stable or increased lung consolidations in the lower lobes and now also the right middle lobe. While some of this may be atelectasis, pneumonia is not excluded. Correlate for any cough or fever. Correlate for any history of aspiration.     Umbilical and left inguinal hernias.     Stranding in the subcutaneous fat of the right medial buttocks region may indicate the presence of inflammation. Correlate clinically. I have personally reviewed pertinent reports. and I have personally reviewed pertinent films in PACS     Medications:  Scheduled PRN   acetylcysteine, 3 mL, Q8H  albuterol, 2.5 mg, Q8H  apixaban, 5 mg, BID  chlorhexidine, 15 mL, Q12H [address]  cholecalciferol, 1,000 Units, Daily  FLUoxetine, 10 mg, Daily  thiamine, 100 mg, Daily      acetaminophen, 650 mg, Q6H PRN  polyethylene glycol, 17 g, Daily PRN  senna, 17.6 mg, Daily PRN       Continuous    multi-electrolyte, 50 mL/hr, Last Rate: 50 mL/hr (09/24/23 2117)         Labs:    CBC    Recent Labs     09/24/23 1824   WBC 8.63   HGB 11.6*   HCT 37.4        BMP    Recent Labs     09/24/23 1824   SODIUM 139   K 4.4   CL 99   CO2 32   AGAP 8   BUN 15   CREATININE 0.77   CALCIUM 9.1       Coags    No recent results     Additional Electrolytes  Recent Labs     09/24/23  1824   MG 2.3          Blood Gas    Recent Labs     09/23/23  0733   PHART 7.534*   ZMK9LMN 39.3   PO2ART 74.0*   ORH7QPG 32.4*   BEART 9.1   SOURCE Radial, Right     Recent Labs     09/23/23  0733   SOURCE Radial, Right    LFTs  Recent Labs     09/24/23  1824   ALT 79*   AST 34   ALKPHOS 74   ALB 3.3*   TBILI 0.71       Infectious  No recent results  Glucose  Recent Labs     09/24/23  1824   GLUC 87               MABER Johnson

## 2023-09-25 NOTE — ASSESSMENT & PLAN NOTE
Recently admitted for acute respiratory failure with notable following events:  • 8/12-8/13: intubated for airway protection when acutely had GCS of 5 at [facility]  • 8/23 CTA PE study: PE in the right lower lobe segmental pulmonary artery  • 8/24 RRT > intubated for hypoxia + airway protection   • 8/25: Bronch: Some mucus plugging present on the left. • 8/26: CT chest: Complete right lower lobe and near complete left lower lobe atelectasis + edema  • 8/31: Completed Zosyn x 7 days   • 9/8: Verdia Reil placed        Plan:    • Currently stable from a respiratory standpoint  • Trach collar during day 35%, wean FiO2 goal SpO2 >92  • ACVC 18/540/40/6 HS to avoid atelectasis, assure normocapnea  • Follow up with Dr. Mabry Or (pulm) outpatient for f/u with home vent/trach management, vs continuation of care at [facility] given complications experienced at home after discharge yesterday  • CT abdomen 9/24 noted "Stable or increased lung consolidations in the lower lobes and now also the right middle lobe. While some of this may be atelectasis, pneumonia is not excluded. Correlate for any cough or fever. Correlate for any history of aspiration."  • No fever, leukocytosis, or increased sputum.  See lung consolidation as outlined  • Continue aggressive pulmonary hygiene

## 2023-09-25 NOTE — ASSESSMENT & PLAN NOTE
Patient initially presented to [facility] on 8/23 with hypoxia and encephalopathy. Acute change in GCS prompted rapid response and subsequent intubation for airway protection. Of note, pt w/ recent hospitalization to [facility] 8/11-8/17 for encephalopathy with unclear etiology despite extensive workup but Wernicke's encephalopathy was on the differential.     •  [place] workup 8/11:  ? Rapid response and intubation on 8/12 for airway protection, extubated on 8/13  ? CT head 8/11: No acute intracranial abnormality. Stable small bilateral subcortical hypoattenuating foci. No associated mass effect. ? MRI 8/12: No acute intracranial abnormality. Stable nonspecific enhancement along the anterior floor of the third ventricle/hypothalamus compared to March 2023 study. Findings below:  ? VEEG 8/12: negative for seizure activity. Possible findings relating to underlying sleep disorder   ? LP: Grossly negative overall  ? DSPO: Treated with high dose thiamine. Psych evaluated, possible bipolar disorder. Discharged to home with his sister with visiting nurses, ST/PT/OT  •  [place] ([place]) 8/23: Presented with hypoxia found by visiting nurse SPO2 in 80's  ? 8/24: RRT > intubated for airway protection   ? Transferred to [facility] on 8/24  •  Yomaira Mcclure 8/24-9/23  ? CT head 8/24: No acute intracranial abnormality  ? 8/25 EEG: No evidence of electrographic seizures. Mild background slowing suggestive of underlying cerebral dysfunction from toxic/metablic/infectious/hypoxic encephalopathy. Clinical correlation is recommended  ? Ach binding and blocking Ab negative   ? MUSK Ab and ACHR modulating Ab neg  ? Serum anti-TPO and DARIN Ab's, RPR, neg. CSF paraneoplastic panel neg  ? Varicella PCR neg  ? Toxoplasma antibodies negative  ? Rheum labs negative  ? 9/6 repeat LP cultures negative   ? 9/7 Completed 7 days of Ceftriaxone to cover for possible neurosyphillis  ? MRI 9/9: No acute intracranial pathology.  Unremarkable MRI of the orbits. Improving enhancement of the hypothalamus. 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 ? 9/10 completed 3 days pulse dose steroids  ?  9/11 completed 5 days IVIG  • 9/15 EMG revealed no acute motor neuron disease      Plan:  • F/u outpatient with neurology in 4 weeks ; no clear diagnosis at this time  • PT/OT while admitted  • Would recommend discharge to an acute care facility with daily PT/OT capabilities to optimize patient's recovery

## 2023-09-25 NOTE — SEDATION DOCUMENTATION
Pt had gtube placed by Dr. Leonard Saeed. Anesthesia in lab to assist. Pt tolerated. Education provided to family. Gauze and tape to prior gtube site. Transported back to ICU with RN, CRNA, and RT.

## 2023-09-25 NOTE — PROGRESS NOTES
[address] [facility]  Interval Progress Note: Critical Care  Name: Ghada Subramanian  MRN: [id number]  Unit/Bed#: ICU 08 I Date of Admission: 9/24/2023   Date of Service: 9/24/2023 I Hospital Day: 0    Interval Events:  · Notified by Radiologist of malpositioned PEG tube and b/l lung consolidation   · Patient currently stable and in no acute distress  · See plan as outlined below     Pertinent New Data:     · Blood pressure 96/59, pulse 68, temperature 97.5, respirations 18, and pulse oximetry 96%    Lab Results   Component Value Date    WBC 8.63 09/24/2023    HGB 11.6 (L) 09/24/2023    HCT 37.4 09/24/2023    MCV 99 (H) 09/24/2023     09/24/2023     · Lactic acid 1.1    CT abdomen/pelvis without contrast:     IMPRESSION:     Malpositioned PEG tube which terminates in the abdominal wall and needs to be repositioned.     Stable or increased lung consolidations in the lower lobes and now also the right middle lobe. While some of this may be atelectasis, pneumonia is not excluded. Correlate for any cough or fever. Correlate for any history of aspiration.     Umbilical and left inguinal hernias.     Stranding in the subcutaneous fat of the right medial buttocks region may indicate the presence of inflammation. Correlate clinically.       Assessment and Plan    Malpositioned PEG Tube  · Hold all medication and TF administration via PEG  · Gentle IVF's for hydration  · Consult General Surgery for PEG exchange  · PEG site care     Lung consolidation  · Stable/increased lung consolidations in lower lobes and RML  · Mucomyst/albuterol nebs q8h for mucokinesis  · Chest PT, VAP precautions  · Trach suctioning PRN  · Does not appear actively infected, monitor off of antibiotics  · Continue nightly ACVC for atelectasis, wean to TC daily  · Trend temperature curve, WBC      Billing Level:  During this visit, Critical care services were medically necessary as this patient had a high probability of imminent or life-threatening deterioration due to multifocal lung consolidation, chronic respiratory failure, malpositioned PEG tube, and neuromuscular weakness, required my direct attention, intervention, and personal management to implement the following: fluid management, test review, direct patient care and documentation of findings. I have personally provided 15 minutes on 09/24/23 of critical care time, exclusive of procedures, teaching, family meetings, and any prior time recorded by providers other than myself.     SIGNATURE: AMBER Kamara

## 2023-09-25 NOTE — ANESTHESIA PREPROCEDURE EVALUATION
Procedure:  IR GASTROSTOMY TUBE PLACEMENT    Relevant Problems   CARDIO   (+) Pure hypertriglyceridemia      MUSCULOSKELETAL   (+) Neuromuscular weakness (HCC)      NEURO/PSYCH   (+) Anxiety   (+) Depression   (+) Diplopia      PULMONARY   (+) Chronic respiratory failure with hypoxia and hypercapnia (HCC)      Cardiovascular and Mediastinum   (+) History of LVH (left ventricular hypertrophy)      Respiratory   (+) Multifocal lung consolidation (HCC)      Genitourinary   (+) 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HCC)      Other   (+) History of Wernicke's encephalopathy   (+) Impaired physical mobility   (+) Migration of percutaneous endoscopic gastrostomy (PEG) tube (HCC)   (+) Retroperitoneal lymphadenopathy   (+) S/P percutaneous endoscopic gastrostomy (PEG) tube placement (HCC)   (+) Status post tracheostomy ([address])   (+) Tobacco use   (+) Umbilical hernia without obstruction and without gangrene        Physical Exam    Airway    Mallampati score: unable to assess  TM Distance: >3 FB  Neck ROM: full     Dental   Comment: Denies loose teeth,     Cardiovascular  Cardiovascular exam normal    Pulmonary  Pulmonary exam normal     Other Findings  Portions of exam deferred due to low yield and/or unknown COVID status  Trach in place  Pt unresponsive      Anesthesia Plan  ASA Score- 4     Anesthesia Type- IV sedation with anesthesia with ASA Monitors. Additional Monitors:   Airway Plan:           Plan Factors-    Chart reviewed. Existing labs reviewed. Patient summary reviewed. Patient is not a current smoker. Induction- intravenous. Postoperative Plan-     Informed Consent- Anesthetic plan and risks discussed with mother. I personally reviewed this patient with the CRNA. Discussed and agreed on the Anesthesia Plan with the CRNA. .        consent from mother Maverick Mckinley by phone.

## 2023-09-25 NOTE — PHYSICAL THERAPY NOTE
PHYSICAL THERAPY     09/25/23 1148   Note Type   Note type Cancelled Session   Cancel Reasons Patient to operating room   Licensure   [place] License Number  [facility] [id number]

## 2023-09-25 NOTE — ASSESSMENT & PLAN NOTE
· Stable/increased lung consolidations in lower lobes and RML seen on CT imaging 9/24  · Respiratory status unchanged/stable from discharge 9/23  · Does not appear actively infected, monitor off of antibiotics  · Mucomyst/albuterol nebs q8h for mucokinesis  · Chest PT, VAP precautions  · Trach suctioning PRN  · Continue nightly ACVC for atelectasis, wean to TC daily  · Trend temperature curve, WBC

## 2023-09-25 NOTE — RESPIRATORY THERAPY NOTE
RT Protocol Note  Nacho Comes 41 y.o. male MRN: [id number]  Unit/Bed#: ICU 08 Encounter: [id number]    Assessment    Principal Problem:    Leaking percutaneous endoscopic gastrostomy (PEG) tube [facility])  Active Problems:    Umbilical hernia without obstruction and without gangrene    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HCC)    History of LVH (left ventricular hypertrophy)    Neuromuscular weakness (HCC)    Depression    Acute pulmonary embolism without acute cor pulmonale (HCC)    Chronic respiratory failure with hypoxia and hypercapnia (HCC)    S/P percutaneous endoscopic gastrostomy (PEG) tube placement (HCC)    Status post tracheostomy (HCC)    Impaired physical mobility      Home Pulmonary Medications:  Home Devices/Therapy: Ventilator, Home O2    Past Medical History:   Diagnosis Date   • Anxiety    • Cancer ([address] )    • Depression    • Psychiatric disorder     bipolar     Social History     Socioeconomic History   • Marital status: Single     Spouse name: None   • Number of children: None   • Years of education: None   • Highest education level: None   Occupational History   • None   Tobacco Use   • Smoking status: Former     Packs/day: 0.50     Years: 5.00     Total pack years: 2.50     Types: Cigarettes     Start date: 1/1/2008   • Smokeless tobacco: Never   Vaping Use   • Vaping Use: Never used   Substance and Sexual Activity   • Alcohol use: Not Currently     Comment: 2 cases of beer daily, stopped 3 years ago   • Drug use: Yes     Types: Marijuana     Comment: Medical marijuana   • Sexual activity: Not Currently   Other Topics Concern   • None   Social History Narrative    ** Merged History Encounter **          Social Determinants of Health     Financial Resource Strain: Not on file   Food Insecurity: No Food Insecurity (8/29/2023)    Hunger Vital Sign    • Worried About Running Out of Food in the Last Year: Never true    • Ran Out of Food in the Last Year: Never true   Transportation Needs: No Transportation Needs (8/29/2023)    PRAPARE - Transportation    • Lack of Transportation (Medical): No    • Lack of Transportation (Non-Medical): No   Physical Activity: Not on file   Stress: Not on file   Social Connections: Not on file   Intimate Partner Violence: Not on file   Housing Stability: Low Risk  (8/12/2023)    Housing Stability Vital Sign    • Unable to Pay for Housing in the Last Year: No    • Number of Places Lived in the Last Year: 1    • Unstable Housing in the Last Year: No       Subjective         Objective    Physical Exam:   Assessment Type: Assess only  General Appearance: Awake  Respiratory Pattern: Normal  Chest Assessment: Chest expansion symmetrical  Bilateral Breath Sounds: Clear, Diminished  Suction: ET Tube  O2 Device: vent    Vitals:  Blood pressure 104/62, pulse 71, temperature 97.5 °F (36.4 °C), temperature source Temporal, resp. rate 18, height 6' 4" (1.93 m), weight 129 kg (285 lb 7.9 oz), SpO2 97 %.     Results from last 7 days   Lab Units 09/23/23  0733   PH ART  7.534*   PCO2 ART mm Hg 39.3   PO2 ART mm Hg 74.0*   HCO3 ART mmol/L 32.4*   BASE EXC ART mmol/L 9.1   O2 CONTENT ART mL/dL 15.4*   O2 HGB, ARTERIAL % 94.6   ABG SOURCE  Radial, Right   MIGUELANGEL TEST  Yes       Maite    O2 Device: vent     Plan    Respiratory Plan: Vent/NIV/HFNC        Resp Comments: patient placed on vent for HS

## 2023-09-25 NOTE — TELEMEDICINE
e-Consult (IPC)  - Interventional Radiology  Enrique Pae 41 y.o. male MRN: [id number]  Unit/Bed#: ICU 08 Encounter: [id number]          Interventional Radiology has been consulted to evaluate [address] [place] [address] to   Consult performed by: Torsten Calero MD  Consult ordered by: AMBER Early        09/25/23    Assessment/Recommendation:     41year old male s/p surgically placed G tube on 9/6. Recent tube feeds / meds coming out of abdominal tract. This prompted a CT scan that demonstrated the tube button in the abdominal wall. The stomach has moved away from the tract. No free air identified. The tube was subsequently removed. Plan for percutaneous G tube placement. Meds, including Eliquis were given through tube on Sunday, however unlikely actually administered as above. Will access for any fluid or air leakage upon insufflation through the prior gastrostomy at time of the procedure. 11-20 minutes, >50% of the total time devoted to medical consultative verbal/EMR discussion between providers. Written report will be generated in the EMR. Thank you for allowing Interventional Radiology to participate in the care of Enronni Pae. Please don't hesitate to call or TigerText us with any questions.      Torsten Calero MD

## 2023-09-25 NOTE — RESPIRATORY THERAPY NOTE
Transported patient from ICU to IR on transport vent with 2 nurses from IR. Waiting while procedure is being done.

## 2023-09-26 ENCOUNTER — HOME CARE VISIT (OUTPATIENT)
Dept: HOME HEALTH SERVICES | Facility: HOME HEALTHCARE | Age: 38
End: 2023-09-26
Payer: COMMERCIAL

## 2023-09-26 ENCOUNTER — APPOINTMENT (OUTPATIENT)
Dept: RADIOLOGY | Facility: HOSPITAL | Age: 38
DRG: 252 | End: 2023-09-26
Payer: COMMERCIAL

## 2023-09-26 ENCOUNTER — APPOINTMENT (OUTPATIENT)
Dept: PERIOP | Facility: HOSPITAL | Age: 38
DRG: 252 | End: 2023-09-26
Payer: COMMERCIAL

## 2023-09-26 LAB
ANION GAP SERPL CALCULATED.3IONS-SCNC: 7 MMOL/L
ARTERIAL PATENCY WRIST A: YES
BASE EX.OXY STD BLDV CALC-SCNC: 93.8 % (ref 60–80)
BASE EXCESS BLDV CALC-SCNC: 6.7 MMOL/L
BODY TEMPERATURE: 96.9 DEGREES FEHRENHEIT
BUN SERPL-MCNC: 12 MG/DL (ref 5–25)
CALCIUM SERPL-MCNC: 8.6 MG/DL (ref 8.4–10.2)
CHLORIDE SERPL-SCNC: 100 MMOL/L (ref 96–108)
CO2 SERPL-SCNC: 30 MMOL/L (ref 21–32)
CREAT SERPL-MCNC: 0.71 MG/DL (ref 0.6–1.3)
ERYTHROCYTE [DISTWIDTH] IN BLOOD BY AUTOMATED COUNT: 15.6 % (ref 11.6–15.1)
GFR SERPL CREATININE-BSD FRML MDRD: 120 ML/MIN/1.73SQ M
GLUCOSE P FAST SERPL-MCNC: 89 MG/DL (ref 65–99)
GLUCOSE SERPL-MCNC: 89 MG/DL (ref 65–140)
HCO3 BLDV-SCNC: 29.8 MMOL/L (ref 24–30)
HCT VFR BLD AUTO: 31.9 % (ref 36.5–49.3)
HGB BLD-MCNC: 10.1 G/DL (ref 12–17)
HOROWITZ INDEX BLDA+IHG-RTO: 40 MM[HG]
Lab: NORMAL
MAGNESIUM SERPL-MCNC: 2.1 MG/DL (ref 1.9–2.7)
MCH RBC QN AUTO: 30.7 PG (ref 26.8–34.3)
MCHC RBC AUTO-ENTMCNC: 31.7 G/DL (ref 31.4–37.4)
MCV RBC AUTO: 97 FL (ref 82–98)
MISCELLANEOUS LAB TEST RESULT: NORMAL
MRSA NOSE QL CULT: NORMAL
O2 CT BLDV-SCNC: 14.6 ML/DL
PCO2 BLDV: 36.8 MM HG (ref 42–50)
PEEP RESPIRATORY: 8 CM[H2O]
PH BLDV: 7.53 [PH] (ref 7.3–7.4)
PHOSPHATE SERPL-MCNC: 4 MG/DL (ref 2.7–4.5)
PLATELET # BLD AUTO: 231 THOUSANDS/UL (ref 149–390)
PMV BLD AUTO: 10 FL (ref 8.9–12.7)
PO2 BLDV: 72.2 MM HG (ref 35–45)
POTASSIUM SERPL-SCNC: 3.7 MMOL/L (ref 3.5–5.3)
RBC # BLD AUTO: 3.29 MILLION/UL (ref 3.88–5.62)
SODIUM SERPL-SCNC: 137 MMOL/L (ref 135–147)
VENT AC: 18
VENT- AC: AC
VT SETTING VENT: 500 ML
WBC # BLD AUTO: 9.45 THOUSAND/UL (ref 4.31–10.16)

## 2023-09-26 PROCEDURE — 99291 CRITICAL CARE FIRST HOUR: CPT | Performed by: STUDENT IN AN ORGANIZED HEALTH CARE EDUCATION/TRAINING PROGRAM

## 2023-09-26 PROCEDURE — 94640 AIRWAY INHALATION TREATMENT: CPT

## 2023-09-26 PROCEDURE — 84100 ASSAY OF PHOSPHORUS: CPT | Performed by: NURSE PRACTITIONER

## 2023-09-26 PROCEDURE — 3E0G76Z INTRODUCTION OF NUTRITIONAL SUBSTANCE INTO UPPER GI, VIA NATURAL OR ARTIFICIAL OPENING: ICD-10-PCS | Performed by: STUDENT IN AN ORGANIZED HEALTH CARE EDUCATION/TRAINING PROGRAM

## 2023-09-26 PROCEDURE — 87070 CULTURE OTHR SPECIMN AEROBIC: CPT | Performed by: NURSE PRACTITIONER

## 2023-09-26 PROCEDURE — 0B9F8ZX DRAINAGE OF RIGHT LOWER LUNG LOBE, VIA NATURAL OR ARTIFICIAL OPENING ENDOSCOPIC, DIAGNOSTIC: ICD-10-PCS | Performed by: STUDENT IN AN ORGANIZED HEALTH CARE EDUCATION/TRAINING PROGRAM

## 2023-09-26 PROCEDURE — 85027 COMPLETE CBC AUTOMATED: CPT | Performed by: NURSE PRACTITIONER

## 2023-09-26 PROCEDURE — 94003 VENT MGMT INPAT SUBQ DAY: CPT

## 2023-09-26 PROCEDURE — 0B9G8ZX DRAINAGE OF LEFT UPPER LUNG LOBE, VIA NATURAL OR ARTIFICIAL OPENING ENDOSCOPIC, DIAGNOSTIC: ICD-10-PCS | Performed by: STUDENT IN AN ORGANIZED HEALTH CARE EDUCATION/TRAINING PROGRAM

## 2023-09-26 PROCEDURE — 82805 BLOOD GASES W/O2 SATURATION: CPT | Performed by: STUDENT IN AN ORGANIZED HEALTH CARE EDUCATION/TRAINING PROGRAM

## 2023-09-26 PROCEDURE — 83735 ASSAY OF MAGNESIUM: CPT | Performed by: NURSE PRACTITIONER

## 2023-09-26 PROCEDURE — 80048 BASIC METABOLIC PNL TOTAL CA: CPT | Performed by: NURSE PRACTITIONER

## 2023-09-26 PROCEDURE — 31624 DX BRONCHOSCOPE/LAVAGE: CPT | Performed by: NURSE PRACTITIONER

## 2023-09-26 PROCEDURE — 94668 MNPJ CHEST WALL SBSQ: CPT

## 2023-09-26 PROCEDURE — NC001 PR NO CHARGE: Performed by: NURSE PRACTITIONER

## 2023-09-26 PROCEDURE — 90677 PCV20 VACCINE IM: CPT | Performed by: STUDENT IN AN ORGANIZED HEALTH CARE EDUCATION/TRAINING PROGRAM

## 2023-09-26 PROCEDURE — 94669 MECHANICAL CHEST WALL OSCILL: CPT

## 2023-09-26 PROCEDURE — 94760 N-INVAS EAR/PLS OXIMETRY 1: CPT

## 2023-09-26 PROCEDURE — 0DH63UZ INSERTION OF FEEDING DEVICE INTO STOMACH, PERCUTANEOUS APPROACH: ICD-10-PCS | Performed by: STUDENT IN AN ORGANIZED HEALTH CARE EDUCATION/TRAINING PROGRAM

## 2023-09-26 PROCEDURE — 99232 SBSQ HOSP IP/OBS MODERATE 35: CPT | Performed by: SPECIALIST

## 2023-09-26 PROCEDURE — 87205 SMEAR GRAM STAIN: CPT | Performed by: NURSE PRACTITIONER

## 2023-09-26 PROCEDURE — 71045 X-RAY EXAM CHEST 1 VIEW: CPT

## 2023-09-26 PROCEDURE — 94150 VITAL CAPACITY TEST: CPT

## 2023-09-26 RX ORDER — MIDAZOLAM HYDROCHLORIDE 2 MG/2ML
4 INJECTION, SOLUTION INTRAMUSCULAR; INTRAVENOUS ONCE
Status: COMPLETED | OUTPATIENT
Start: 2023-09-26 | End: 2023-09-26

## 2023-09-26 RX ORDER — SODIUM CHLORIDE, SODIUM GLUCONATE, SODIUM ACETATE, POTASSIUM CHLORIDE, MAGNESIUM CHLORIDE, SODIUM PHOSPHATE, DIBASIC, AND POTASSIUM PHOSPHATE .53; .5; .37; .037; .03; .012; .00082 G/100ML; G/100ML; G/100ML; G/100ML; G/100ML; G/100ML; G/100ML
125 INJECTION, SOLUTION INTRAVENOUS CONTINUOUS
Status: DISCONTINUED | OUTPATIENT
Start: 2023-09-26 | End: 2023-09-26

## 2023-09-26 RX ORDER — FENTANYL CITRATE 50 UG/ML
100 INJECTION, SOLUTION INTRAMUSCULAR; INTRAVENOUS ONCE
Status: COMPLETED | OUTPATIENT
Start: 2023-09-26 | End: 2023-09-26

## 2023-09-26 RX ADMIN — CHLORHEXIDINE GLUCONATE 15 ML: 1.2 RINSE ORAL at 20:05

## 2023-09-26 RX ADMIN — ALBUTEROL SULFATE 2.5 MG: 2.5 SOLUTION RESPIRATORY (INHALATION) at 15:00

## 2023-09-26 RX ADMIN — ACETYLCYSTEINE 600 MG: 200 SOLUTION ORAL; RESPIRATORY (INHALATION) at 23:07

## 2023-09-26 RX ADMIN — ALBUTEROL SULFATE 2.5 MG: 2.5 SOLUTION RESPIRATORY (INHALATION) at 23:07

## 2023-09-26 RX ADMIN — PNEUMOCOCCAL 20-VALENT CONJUGATE VACCINE 0.5 ML
2.2; 2.2; 2.2; 2.2; 2.2; 2.2; 2.2; 2.2; 2.2; 2.2; 2.2; 2.2; 2.2; 2.2; 2.2; 2.2; 4.4; 2.2; 2.2; 2.2 INJECTION, SUSPENSION INTRAMUSCULAR at 09:37

## 2023-09-26 RX ADMIN — FLUOXETINE 10 MG: 10 CAPSULE ORAL at 09:29

## 2023-09-26 RX ADMIN — Medication 1000 UNITS: at 09:28

## 2023-09-26 RX ADMIN — FENTANYL CITRATE 100 MCG: 50 INJECTION INTRAMUSCULAR; INTRAVENOUS at 10:50

## 2023-09-26 RX ADMIN — THIAMINE HCL TAB 100 MG 100 MG: 100 TAB at 09:28

## 2023-09-26 RX ADMIN — CHLORHEXIDINE GLUCONATE 15 ML: 1.2 RINSE ORAL at 09:28

## 2023-09-26 RX ADMIN — ALBUTEROL SULFATE 2.5 MG: 2.5 SOLUTION RESPIRATORY (INHALATION) at 07:24

## 2023-09-26 RX ADMIN — APIXABAN 5 MG: 5 TABLET, FILM COATED ORAL at 09:28

## 2023-09-26 RX ADMIN — MIDAZOLAM 4 MG: 1 INJECTION INTRAMUSCULAR; INTRAVENOUS at 10:50

## 2023-09-26 RX ADMIN — ACETYLCYSTEINE 600 MG: 200 SOLUTION ORAL; RESPIRATORY (INHALATION) at 07:24

## 2023-09-26 RX ADMIN — ACETAMINOPHEN 650 MG: 650 SUSPENSION ORAL at 09:28

## 2023-09-26 RX ADMIN — APIXABAN 5 MG: 5 TABLET, FILM COATED ORAL at 17:27

## 2023-09-26 RX ADMIN — ACETYLCYSTEINE 600 MG: 200 SOLUTION ORAL; RESPIRATORY (INHALATION) at 15:00

## 2023-09-26 RX ADMIN — SODIUM CHLORIDE, SODIUM GLUCONATE, SODIUM ACETATE, POTASSIUM CHLORIDE, MAGNESIUM CHLORIDE, SODIUM PHOSPHATE, DIBASIC, AND POTASSIUM PHOSPHATE 125 ML/HR: .53; .5; .37; .037; .03; .012; .00082 INJECTION, SOLUTION INTRAVENOUS at 11:00

## 2023-09-26 NOTE — PROCEDURES
Bronchoscopy (Bedside)    Date/Time: 9/26/2023 11:13 AM    Performed by: AMBER Desai  Authorized by: AMBER Desai    Patient location:  Bedside  Other Assisting Provider: Yes (comment) (Dr. Gail Fontanez)    Consent:     Consent obtained:  Verbal (between Dr. Farrell Sacks and pt's mother)    Consent given by:  Healthcare agent  Universal protocol:     Procedure explained and questions answered to patient or proxy's satisfaction: yes      Relevant documents present and verified: yes      Radiology Images displayed and confirmed. If images not available, report reviewed: yes      Required blood products, implants, devices and special equipment available: yes      Immediately prior to procedure a time out was called: yes      Patient identity confirmed: Anonymous protocol, patient vented/unresponsive  Indications:     Procedure Purpose: therapeutic      Procedure Purpose comment:  RLL opacity on CXR and large secretions on admission     Indications: pneumonia/infiltrate    Sedation:     Sedation type:  Continuous (ICU/vent) (Given Versed 4 mg IV and fentanyl 100 mcg IV for procedure)  Anesthesia (see MAR for exact dosages): Anesthesia method:  None  Scope passed:      Bronchoscopy findings location: Tracheostomy tube. Upper Airway:     Trachea: normal    Airway:     Airway:  Pt placed back on ventilator and put in the supine position. Bronchoscope passed through pt's 8.5 shiley trach. Procedure details:     Description:  Scope passed trach. The prashant, Right and left-sided airways were WNL and inspected to the level of the segmental bronchi. No secretions noted. RLL lavaged and sent for culture/GS.      Right Airway: anatomy normal and mucosa normal    Right Airway Abnormal Findings: NA    Left Airway: anatomy normal and mucosa normal    Left Airway Abnormal findings: NA    Procedures performed:     Lavage site:  RLL  Post-procedure details:     Chest x-ray performed: no      Patient tolerance of procedure:   Tolerated well, no immediate complications

## 2023-09-26 NOTE — UTILIZATION REVIEW
Admission Orders (From admission, onward)     Ordered        09/26/23 1408  Inpatient Admission  Once            09/24/23 1747  Place in Observation  Once                      Orders Placed This Encounter   Procedures   • Inpatient Admission     Standing Status:   Standing     Number of Occurrences:   1     Order Specific Question:   Level of Care     Answer:   Critical Care [15]     Order Specific Question:   Estimated length of stay     Answer:   More than 2 Midnights     Order Specific Question:   Certification     Answer:   I certify that inpatient services are medically necessary for this patient for a duration of greater than two midnights. See H&P and MD Progress Notes for additional information about the patient's course of treatment. Observation 9/24/23 @ [phone] converted to inpatient admission 9/26/23 @ 1408 for continued care & tx for PEG tube migration. Current Patient Class: inpatient  Current Level of Care: ICU  HPI:38 y.o. male initially admitted on 9/26/23     Assessment/Plan:   Observation to IP admission 9/26/23:  S/P PEG tube replacement. Nutrition consulted for tube feeding recommendations. Trach in place, still requiring ventilator. RLL opacity on CXR and large secretions on admission. Bronchoscopy done today    Per surg: Dislodged PEG tube -resolved, gastrostomy tube placed by IR successfully 9/25/2023, AVSS, when set to gravity was draining 160 mL, currently titrating up Jevity 1.2 kg without any issues, patient is quite sedated currently due to bronchoscopy and Ativan, no palpable abdominal pain, active bowel sounds, no distention, no pain around previous PEG tube site, during interventional radiology did not see any sort of microfoci of air leaking from prior PEG tube insertion site, labs within normal limits      9/27/23-  DAY 2:  PEG tube dislodgment, status post IR placement, currently to gravity. Infusing medications, start TF today.  Pt on the ventilator, his settings were adjusted yesterday and his blood gas seemed improved today; continue Continue PEEP 8, 6 ml per kg is about 520 TV, on AC so ; 35% Trach collar during day, wean FiO2 goal SpO2 >92. He is on trach collar now, no significant secretions currently. S/p Bronchoscopy BAL with gram-positive cocci in pairs chains and clusters. Vital Signs:   Date/Time Temp Pulse Resp BP MAP (mmHg) SpO2 FiO2 (%) O2 Flow Rate (L/min) O2 Device Patient Position - Orthostatic VS   09/26/23 1300 -- 71 16 91/57 69 97 % -- -- -- --   09/26/23 1245 -- 64 16 92/55 68 98 % -- -- -- --   09/26/23 1230 -- 65 16 89/53 Abnormal  66 97 % -- -- -- --   09/26/23 1215 -- 66 16 90/52 65 96 % -- -- -- --   09/26/23 1200 -- 71 16 85/52 Abnormal  63 Abnormal  95 % -- -- -- --   09/26/23 1145 -- 78 16 86/51 Abnormal  64 Abnormal  93 % -- -- -- --   09/26/23 1130 -- 81 16 92/55 71 91 % -- -- -- --   09/26/23 1115 -- 79 16 108/59 77 99 % -- -- -- --   09/26/23 1110 -- 80 17 111/56 78 99 % -- -- -- --   09/26/23 1105 -- 92 26 Abnormal  85/58 Abnormal  66 100 % -- -- -- --   09/26/23 1100 -- 67 20 86/51 Abnormal  63 Abnormal  100 % -- -- -- --   09/26/23 1055 -- 75 18 87/54 Abnormal  66 99 % -- -- -- --   09/26/23 1000 -- 79 31 Abnormal  91/56 68 95 % -- -- -- --   09/26/23 0900 -- 85 27 Abnormal  107/65 81 99 % -- -- -- --   09/26/23 0800 97.6 °F (36.4 °C) 92 18 107/61 78 95 % -- -- Trach mask Lying       Diet Enteral/Parenteral; Tube Feeding No Oral Diet; Jevity 1.5; Continuous; 70; 200; Water; Every 4 hours    Pertinent Labs/Diagnostic Results:  9/26 Bronchoscopy=  FINDINGS:   All observed locations appeared normal, including the vocal cords, trachea, main prashant, left lung and right lung. Bronchoalveolar lavage was performed in the RLL; the fluid appeared cloudy  IMPRESSION:   Right lower lobe BAL   No endobronchial lesions, mucous plugs noted       9/26 XR chest portable ICU  INDICATION:   atelectasis.   IMPRESSION:  Persistent low lung volumes and bibasal subsegmental atelectasi  Tracheostomy tube could be advanced slightly if clinically appropriate  Similar to prior study    Results from last 7 days   Lab Units 09/26/23  0543 09/24/23  1824   WBC Thousand/uL 9.45 8.63   HEMOGLOBIN g/dL 10.1* 11.6*   HEMATOCRIT % 31.9* 37.4   PLATELETS Thousands/uL 231 247   NEUTROS ABS Thousands/µL  --  6.08     Results from last 7 days   Lab Units 09/26/23  0543 09/24/23  1824   SODIUM mmol/L 137 139   POTASSIUM mmol/L 3.7 4.4   CHLORIDE mmol/L 100 99   CO2 mmol/L 30 32   ANION GAP mmol/L 7 8   BUN mg/dL 12 15   CREATININE mg/dL 0.71 0.77   EGFR ml/min/1.73sq m 120 116   CALCIUM mg/dL 8.6 9.1   MAGNESIUM mg/dL 2.1 2.3   PHOSPHORUS mg/dL 4.0  --      Results from last 7 days   Lab Units 09/24/23  1824   AST U/L 34   ALT U/L 79*   ALK PHOS U/L 74   TOTAL PROTEIN g/dL 7.3   ALBUMIN g/dL 3.3*   TOTAL BILIRUBIN mg/dL 0.71     Results from last 7 days   Lab Units 09/25/23  1558 09/25/23  1342 09/25/23  0927   POC GLUCOSE mg/dl 92 153* 98     Results from last 7 days   Lab Units 09/26/23  0543 09/24/23  1824   GLUCOSE RANDOM mg/dL 89 87     Results from last 7 days   Lab Units 09/23/23  0733 09/22/23  0441 09/21/23  0943   PH ART  7.534* 7.427 7.414   PCO2 ART mm Hg 39.3 55.1* 54.9*   PO2 ART mm Hg 74.0* 77.7 72.4*   HCO3 ART mmol/L 32.4* 35.5* 34.3*   BASE EXC ART mmol/L 9.1 9.4 8.2   O2 CONTENT ART mL/dL 15.4* 16.7 15.8*   O2 HGB, ARTERIAL % 94.6 94.0 93.6*   ABG SOURCE  Radial, Right Radial, Right Radial, Right     Results from last 7 days   Lab Units 09/27/23  0508 09/26/23  0543   PH SYLVESTER  7.389 7.526*   PCO2 SYLVESTER mm Hg 49.7 36.8*   PO2 SYLVESTER mm Hg 72.6* 72.2*   HCO3 SYLVESTER mmol/L 29.3 29.8   BASE EXC SYLVESTER mmol/L 3.6 6.7   O2 CONTENT SYLVESTER ml/dL 15.2 14.6   O2 HGB, VENOUS % 91.3* 93.8*     Results from last 7 days   Lab Units 09/24/23  1824   LACTIC ACID mmol/L 1.1     Medications:   Scheduled Medications:  acetylcysteine, 3 mL, Nebulization, Q8H  albuterol, 2.5 mg, Nebulization, Q8H  apixaban, 5 mg, Per PEG Tube, BID  chlorhexidine, 15 mL, Mouth/Throat, Q12H [address]  cholecalciferol, 1,000 Units, Per G Tube, Daily  FLUoxetine, 10 mg, Per G Tube, Daily  thiamine, 100 mg, Per G Tube, Daily    Continuous IV Infusions:  multi-electrolyte, 125 mL/hr, Intravenous, Continuous    PRN Meds:  acetaminophen, 650 mg, Per G Tube, Q6H PRN  polyethylene glycol, 17 g, Per PEG Tube, Daily PRN  senna, 17.6 mg, Per PEG Tube, Daily PRN    Discharge Plan: d    Network Utilization Review Department  ATTENTION: Please call with any questions or concerns to [phone] and carefully listen to the prompts so that you are directed to the right person. All voicemails are confidential.  Jo Jensen all requests for admission clinical reviews, approved or denied determinations and any other requests to dedicated fax number below belonging to the campus where the patient is receiving treatment.  List of dedicated fax numbers for the Facilities:  [place] DENIALS (Administrative/Medical Necessity) [phone] [address] (Maternity/NICU/Pediatrics) [phone] [address] [phone]   [facility] [address] [facility] [phone]   [address] [facility] [address] [facility] [address] [place] [address] [phone]   [address] [facility] [address]  [phone]

## 2023-09-26 NOTE — ASSESSMENT & PLAN NOTE
· Required ceiling lift for mobility while hospitalized, as well as aggressive PT/OT for his deconditioned status  · Was discharged home with visiting nurses and per his sister, has not yet received/been set up for PT/OT per her knowledge. Unfortunately his mobilization chair has not yet been delivered to his home, and he has remained in bed since discharge. · There is concern for insufficient resources at his home, and he is at high risk for skin breakdown given his inability to offload pressure.  Would recommend interim discharge to an acute care rehab where he has access to more aggressive PT/OT and lift equipment until he is able to regain some strength and mobility  · PT/OT consulted  · Case Management for assistance with discharge

## 2023-09-26 NOTE — ASSESSMENT & PLAN NOTE
Patient initially presented to [facility] on 8/23 with hypoxia and encephalopathy. Acute change in GCS prompted rapid response and subsequent intubation for airway protection. Of note, pt w/ recent hospitalization to [facility] 8/11-8/17 for encephalopathy with unclear etiology despite extensive workup but Wernicke's encephalopathy was on the differential.     •  [place] workup 8/11:  ? Rapid response and intubation on 8/12 for airway protection, extubated on 8/13  ? CT head 8/11: No acute intracranial abnormality. Stable small bilateral subcortical hypoattenuating foci. No associated mass effect. ? MRI 8/12: No acute intracranial abnormality. Stable nonspecific enhancement along the anterior floor of the third ventricle/hypothalamus compared to March 2023 study. Findings below:  ? VEEG 8/12: negative for seizure activity. Possible findings relating to underlying sleep disorder   ? LP: Grossly negative overall  ? DSPO: Treated with high dose thiamine. Psych evaluated, possible bipolar disorder. Discharged to home with his sister with visiting nurses, ST/PT/OT  •  Violette Glance 8/23: Presented with hypoxia found by visiting nurse SPO2 in 80's  ? 8/24: RRT > intubated for airway protection   ? [facility] to [facility]  •  [facility] 8/24-9/23  ? CT head 8/24: No acute intracranial abnormality  ? 8/25 EEG: No evidence of electrographic seizures. Mild background slowing suggestive of underlying cerebral dysfunction from toxic/metablic/infectious/hypoxic encephalopathy. Clinical correlation is recommended  ? Ach binding and blocking Ab negative   ? MUSK Ab and ACHR modulating Ab neg  ? Serum anti-TPO and DARIN Ab's, RPR, neg. CSF paraneoplastic panel neg  ? Varicella PCR neg  ? Toxoplasma antibodies negative  ? Rheum labs negative  ? 9/6 repeat LP cultures negative   ? 9/7 Completed 7 days of Ceftriaxone to cover for possible neurosyphillis  ? MRI 9/9: No acute intracranial pathology.  Unremarkable MRI of the orbits. Improving enhancement of the hypothalamus. 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 ? 9/10 completed 3 days pulse dose steroids  ?  9/11 completed 5 days IVIG  • 9/15 EMG revealed no acute motor neuron disease      Plan:  • F/u outpatient with neurology in 4 weeks ; no clear diagnosis at this time  • PT/OT while admitted  • Would recommend discharge to an acute care facility with daily PT/OT capabilities to optimize patient's recovery

## 2023-09-26 NOTE — ASSESSMENT & PLAN NOTE
· Brought to ED 9/24 for concern of PEG dislodgement and leaking  · CT abdomen completed: The PEG tube is dislodged and no longer within the stomach. It terminates within the abdominal wall and the balloon still appears inflated. There is some haziness within the subcutaneous fat and muscular layer surrounding the site of the tube.   · 9/25 fluro guided 18F peg tube placement by IR    Plan:  · Plan to resume meds and feeds today

## 2023-09-26 NOTE — PROGRESS NOTES
[address]  Progress Note  Name: Jorje Conklin  MRN: [id number]  Unit/Bed#: ICU 08 I Date of Admission: 9/24/2023   Date of Service: 9/26/2023 I Hospital Day: 0    Assessment/Plan   * Migration of percutaneous endoscopic gastrostomy (PEG) tube [facility])  Assessment & Plan  · Brought to ED 9/24 for concern of PEG dislodgement and leaking  · CT abdomen completed: The PEG tube is dislodged and no longer within the stomach. It terminates within the abdominal wall and the balloon still appears inflated. There is some haziness within the subcutaneous fat and muscular layer surrounding the site of the tube. · 9/25 fluro guided 18F peg tube placement by IR    Plan:  · Plan to resume meds and feeds today     Chronic respiratory failure with hypoxia and hypercapnia ([address])  Assessment & Plan    Recently admitted for acute respiratory failure with notable following events:  • 8/12-8/13: intubated for airway protection when acutely had GCS of 5 at [facility]  • 8/23 CTA PE study: PE in the right lower lobe segmental pulmonary artery  • 8/24 RRT > intubated for hypoxia + airway protection   • 8/25: Bronch: Some mucus plugging present on the left.   • 8/26: CT chest: Complete right lower lobe and near complete left lower lobe atelectasis + edema  • 8/31: Completed Zosyn x 7 days   • 9/8: Roxanna Relic placed        Plan:  • Trach collar during day 35%, wean FiO2 goal SpO2 >92  • ACVC 18/500/40/8 HS   • Follow up with Dr. Sean Bales (pulm) outpatient for f/u with home vent/trach management, vs continuation of care at [facility] given complications experienced at home after discharge yesterday  • Continue aggressive pulmonary hygiene   • Follow up with [facility]         Acute pulmonary embolism without acute cor pulmonale ([address])  Assessment & Plan  · Plans to resume [facility] today s/p peg tube replacement     Neuromuscular weakness [facility])  Assessment & Plan    Patient initially presented to [facility] on 8/23 with hypoxia and encephalopathy. Acute change in GCS prompted rapid response and subsequent intubation for airway protection. Of note, pt w/ recent hospitalization to [facility] 8/11-8/17 for encephalopathy with unclear etiology despite extensive workup but Wernicke's encephalopathy was on the differential.     •  [place] workup 8/11:  ? Rapid response and intubation on 8/12 for airway protection, extubated on 8/13  ? CT head 8/11: No acute intracranial abnormality. Stable small bilateral subcortical hypoattenuating foci. No associated mass effect. ? MRI 8/12: No acute intracranial abnormality. Stable nonspecific enhancement along the anterior floor of the third ventricle/hypothalamus compared to March 2023 study. Findings below:  ? VEEG 8/12: negative for seizure activity. Possible findings relating to underlying sleep disorder   ? LP: Grossly negative overall  ? DSPO: Treated with high dose thiamine. Psych evaluated, possible bipolar disorder. Discharged to home with his sister with visiting nurses, ST/PT/OT  • [facility]: Presented with hypoxia found by visiting nurse SPO2 in 80's  ? 8/24: RRT > intubated for airway protection   ? Transferred to [facility] on 8/24  •  Nick Oconnor 8/24-9/23  ? CT head 8/24: No acute intracranial abnormality  ? 8/25 EEG: No evidence of electrographic seizures. Mild background slowing suggestive of underlying cerebral dysfunction from toxic/metablic/infectious/hypoxic encephalopathy. Clinical correlation is recommended  ? Ach binding and blocking Ab negative   ? MUSK Ab and ACHR modulating Ab neg  ? Serum anti-TPO and DARIN Ab's, RPR, neg. CSF paraneoplastic panel neg  ? Varicella PCR neg  ? Toxoplasma antibodies negative  ? Rheum labs negative  ? 9/6 repeat LP cultures negative   ? 9/7 Completed 7 days of Ceftriaxone to cover for possible neurosyphillis  ? MRI 9/9: No acute intracranial pathology. Unremarkable MRI of the orbits. Improving enhancement of the hypothalamus.  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 ? 9/10 completed 3 days pulse dose steroids  ? 9/11 completed 5 days IVIG  • 9/15 EMG revealed no acute motor neuron disease      Plan:  • F/u outpatient with neurology in 4 weeks ; no clear diagnosis at this time  • PT/OT while admitted  • Would recommend discharge to an acute care facility with daily PT/OT capabilities to optimize patient's recovery       Impaired physical mobility  Assessment & Plan  · Required ceiling lift for mobility while hospitalized, as well as aggressive PT/OT for his deconditioned status  · Was discharged home with visiting nurses and per his sister, has not yet received/been set up for PT/OT per her knowledge. Unfortunately his mobilization chair has not yet been delivered to his home, and he has remained in bed since discharge. · There is concern for insufficient resources at his home, and he is at high risk for skin breakdown given his inability to offload pressure.  Would recommend interim discharge to an acute care rehab where he has access to more aggressive PT/OT and lift equipment until he is able to regain some strength and mobility  · PT/OT consulted  · Case Management for assistance with discharge    Multifocal lung consolidation [facility])  Assessment & Plan  · Stable/increased lung consolidations in lower lobes and RML seen on CT imaging 9/24  · Respiratory status unchanged/stable from discharge 9/23     Plan:  · Does not appear actively infected, monitor off of antibiotics  · Mucomyst/albuterol nebs q8h for mucokinesis  · Chest PT, VAP precautions  · Trach suctioning PRN  · Continue nightly ACVC for atelectasis, wean to TC daily  · Trend temperature curve, WBC        Status post tracheostomy [facility])  Assessment & Plan    • Trach care  • Trach sutures removed 9/23         S/P percutaneous endoscopic gastrostomy (PEG) tube placement [facility])  Assessment & Plan    • See plan as outlined above      History of LVH (left ventricular hypertrophy)  Assessment & Plan    PTA Med: Torsemide 20 mg daily  • Remains off torsemide          Depression  Assessment & Plan    • Fluoxetine 10 mg daily      Seminoma of left testis [facility])  Assessment & Plan    • Testicular cancer 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 • Began Chemo in March 2023: Etoposide and Cisplatin with plan for 4 cycles, 5 days every 21 days  • Did not complete chemo treatment due to adverse effects  ? After 3rd cycle reported double vision, labile affect and right sided weakness and therefore chemotherapy was stopped on 5/26/23  • Outpatient follow up with Heme/Onc       Umbilical hernia without obstruction and without gangrene  Assessment & Plan    • Umbilical hernia present, easily reducible            ICU Core Measures     Vented Patient  VAP Bundle  VAP bundle ordered     A: Assess, Prevent, and Manage Pain · Has pain been assessed? Yes  · Need for changes to pain regimen? NA   B: Both Spontaneous Awakening Trials (SATs) and Spontaneous Breathing Trials (SBTs) · Plan to perform spontaneous awakening trial today? N/A   · Plan to perform spontaneous breathing trial today? N/A   · Obvious barriers to extubation? No   C: Choice of Sedation · RASS Goal: 0 Alert and Calm or N/A patient not on sedation  · Need for changes to sedation or analgesia regimen? NA   D: Delirium · CAM-ICU: Negative   E: Early Mobility  · Plan for early mobility? Yes   F: Family Engagement · Plan for family engagement today? Yes       Review of Invasive Devices:        Prophylaxis:  VTE VTE covered by:  apixaban, Per PEG Tube       Stress Ulcer  not ordered       Subjective   HPI/24hr events: Patient underwent fluro guided peg tube placement by IR. Peg tube remained to gravity drainage overnight. Review of Systems   All other systems reviewed and are negative.          Objective                            Vitals I/O      Most Recent Min/Max in 24hrs   Temp (!) 96.8 °F (36 °C) Temp  Min: 96.8 °F (36 °C)  Max: 98.4 °F (36.9 °C)   Pulse 84 Pulse  Min: 60  Max: 90   Resp 18 Resp  Min: 14  Max: 36   /68 BP  Min: 93/58  Max: 152/69   O2 Sat 98 % SpO2  Min: 91 %  Max: 99 %      Intake/Output Summary (Last 24 hours) at 9/26/2023 0005  Last data filed at 9/25/2023 2101  Gross per 24 hour   Intake 150 ml   Output 1000 ml   Net -850 ml         Diet NPO     Invasive Monitoring Physical exam    Physical Exam  Eyes:      Pupils: Pupils are equal, round, and reactive to light. Skin:     General: Skin is warm and dry. HENT:      Head: Normocephalic. Mouth/Throat:      Mouth: Mucous membranes are moist.   Neck:     Trachea: Tracheostomy present. Cardiovascular:      Rate and Rhythm: Normal rate and regular rhythm. Pulses: Normal pulses. Abdominal:      General: There is abdominal type feeding tube. Palpations: Abdomen is soft. Tenderness: There is no abdominal tenderness. Constitutional:       General: He is not in acute distress. Appearance: He is obese. He is ill-appearing (chronic). Pulmonary:      Effort: No accessory muscle usage, respiratory distress or accessory muscle usage. Breath sounds: Rhonchi present. No wheezing or rales. Neurological:      Mental Status: He is oriented to person, place and time. Comments: Moves all extremities    Genitourinary/Anorectal:  external catheterVitals reviewed. Diagnostic Studies      EKG: NSR    Imaging: IR gastrostomy tube placement: 1. Initial fluoroscopy confirmed intragastric pooling of contrast. 2.  Final fluoroscopic image showed the catheter to be in good position   I have personally reviewed pertinent reports.        Medications:  Scheduled PRN   acetylcysteine, 3 mL, Q8H  albuterol, 2.5 mg, Q8H  apixaban, 5 mg, BID  chlorhexidine, 15 mL, Q12H ISAEL  cholecalciferol, 1,000 Units, Daily  FLUoxetine, 10 mg, Daily  pneumococcal 20-ollie conj vacc, 0.5 mL, Once  thiamine, 100 mg, Daily      acetaminophen, 650 mg, Q6H PRN  polyethylene glycol, 17 g, Daily PRN  senna, 17.6 mg, Daily PRN       Continuous          Labs:    CBC    Recent Labs     09/24/23 1824   WBC 8.63   HGB 11.6*   HCT 37.4        BMP    Recent Labs     09/24/23  1824   SODIUM 139   K 4.4   CL 99   CO2 32   AGAP 8   BUN 15   CREATININE 0.77   CALCIUM 9.1       Coags    No recent results     Additional Electrolytes  Recent Labs     09/24/23  1824   MG 2.3          Blood Gas    No recent results  No recent results LFTs  Recent Labs     09/24/23  1824   ALT 79*   AST 34   ALKPHOS 74   ALB 3.3*   TBILI 0.71       Infectious  No recent results  Glucose  Recent Labs     09/24/23  1824   GLUC 87               [address] [facility]

## 2023-09-26 NOTE — QUICK NOTE
Critical care    Family update    I discussed with the patient's mother Ileana Calzada, about possible discharge and how it may be unsafe for him to go home. She states during his last hospitalization the closest facility for him was [facility] and is too far for the family. I discussed that may not be safe for him to go home along with all the physical therapy needs and weaning parameters on the ventilator. She understood but reiterated that 2 family members are in the healthcare facility and would prefer patient come home. I will relay this to  for assistance.     James Arizmendi MD  Pulmonary and Critical Care Medicine

## 2023-09-26 NOTE — SEPSIS NOTE
Sepsis Note   Glen Harman 41 y.o. male MRN: [id number]  Unit/Bed#: ICU 08 Encounter: [id number]       Initial Sepsis Screening     Row Name 09/25/23 2006                Is the patient's history suggestive of a new or worsening infection? No  -TT              User Key  (r) = Recorded By, (t) = Taken By, (c) = Cosigned By    [facility] Name Provider Type    TT [address] Alfonso Jansen, AMBER Nurse Practitioner                    Body mass index is 35.23 kg/m².   Wt Readings from Last 1 Encounters:   09/25/23 131 kg (289 lb 7.4 oz)     IBW (Ideal Body Weight): 86.8 kg    Ideal body weight: 86.8 kg (191 lb 5.7 oz)  Adjusted ideal body weight: 105 kg (230 lb 9.6 oz)

## 2023-09-26 NOTE — PROGRESS NOTES
Progress Note - General Surgery   Gayathri Nguyễn 41 y.o. male MRN: [id number]  Unit/Bed#: ICU 08 Encounter: [id number]    Assessment:  1) Dislodged PEG tube -resolved, gastrostomy tube placed by IR successfully 9/25/2023, AVSS, when set to gravity was draining 160 mL, currently titrating up Jevity 1.2 kg without any issues, patient is quite sedated currently due to bronchoscopy and Ativan, no palpable abdominal pain, active bowel sounds, no distention, no pain around previous PEG tube site, during interventional radiology did not see any sort of microfoci of air leaking from prior PEG tube insertion site, labs within normal limits    Plan:  1)   - We will default to IR for management of gastrostomy tube  -Replace every 6 months per the IR recommendations  -Titrate Jevity 1.2 kcal up to target feeding goals  -Consult nutrition if needed for recommendations on Jevity 1.2 kil killian  -Reviewed labs   - examined prior PEG tube insertion site  -Discussed with critical care team that if patient does exhibit any signs of infected PEG tube insertion site with increased redness, tenderness, drainage of purulence there is always a possibility that we may need to incise and drain it, general surgery team can be consulted as needed    Subjective/Objective   Chief Complaint: Intubated    Subjective: Intubated and sedated    Objective:     Blood pressure 111/56, pulse 80, temperature 97.6 °F (36.4 °C), temperature source Temporal, resp. rate 17, height 6' 4" (1.93 m), weight 129 kg (285 lb 7.9 oz), SpO2 99 %. ,Body mass index is 34.75 kg/m².       Intake/Output Summary (Last 24 hours) at 9/26/2023 1134  Last data filed at 9/26/2023 0355  Gross per 24 hour   Intake 150 ml   Output 1060 ml   Net -910 ml       Invasive Devices     Central Venous Catheter Line  Duration           Port A Cath Right -- days          Peripheral Intravenous Line  Duration           Peripheral IV 09/24/23 Left;Ventral (anterior) Hand 1 day    Peripheral IV 09/24/23 Right Antecubital 1 day          Drain  Duration           External Urinary Catheter Small <1 day    Gastrostomy/Enterostomy Percutaneous Interventional Gastrostomy 18 Fr. LUQ <1 day          Airway  Duration           Surgical Airway Shiley Cuffed 17 days                Physical Exam: BP 91/57   Pulse 71   Temp 97.6 °F (36.4 °C) (Temporal)   Resp 16   Ht 6' 4" (1.93 m)   Wt 129 kg (285 lb 7.9 oz)   SpO2 97%   BMI 34.75 kg/m²   General appearance: unresponsive due to high dose of ativan and bronchoscopy  Head: Normocephalic, without obvious abnormality, atraumatic  Lungs: clear to auscultation bilaterally  Heart: normal rate  Abdomen: soft, non tender, no active BS  Skin: new IR gastrostomy tube in place, old PEG tube site in open and drainaing appropriately, no erythema/swelling/purulence     Lab, Imaging and other studies:  I have personally reviewed pertinent lab results.   , CBC:   Lab Results   Component Value Date    WBC 9.45 09/26/2023    HGB 10.1 (L) 09/26/2023    HCT 31.9 (L) 09/26/2023    MCV 97 09/26/2023     09/26/2023    RBC 3.29 (L) 09/26/2023    MCH 30.7 09/26/2023    MCHC 31.7 09/26/2023    RDW 15.6 (H) 09/26/2023    MPV 10.0 09/26/2023   , CMP:   Lab Results   Component Value Date    SODIUM 137 09/26/2023    K 3.7 09/26/2023     09/26/2023    CO2 30 09/26/2023    BUN 12 09/26/2023    CREATININE 0.71 09/26/2023    CALCIUM 8.6 09/26/2023    EGFR 120 09/26/2023     VTE Pharmacologic Prophylaxis: Eliquis  VTE Mechanical Prophylaxis: sequential compression device

## 2023-09-26 NOTE — OCCUPATIONAL THERAPY NOTE
Occupational Therapy Cancellation     Patient Name: Radu Andrews  JQWAK'P Date: 9/26/2023  Problem List  Principal Problem:    Migration of percutaneous endoscopic gastrostomy (PEG) tube [facility])  Active Problems:    Umbilical hernia without obstruction and without gangrene    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[address])    History of LVH (left ventricular hypertrophy)    Neuromuscular weakness (HCC)    Depression    Acute pulmonary embolism without acute cor pulmonale (HCC)    Chronic respiratory failure with hypoxia and hypercapnia (HCC)    S/P percutaneous endoscopic gastrostomy (PEG) tube placement ([address])    Status post tracheostomy ([address])    Impaired physical mobility    Multifocal lung consolidation ([address])    Past Medical History  Past Medical History:   Diagnosis Date    Anxiety     Cancer ([address])     Depression     Psychiatric disorder     bipolar     Past Surgical History  Past Surgical History:   Procedure Laterality Date    IR BIOPSY LYMPH NODE  1/20/2023    IR GASTROSTOMY TUBE PLACEMENT  9/25/2023    IR LUMBAR PUNCTURE  9/6/2023    IR PORT PLACEMENT  3/14/2023    ORCHIECTOMY Left 12/14/2022    Procedure: ORCHIECTOMY INGUINAL;  Surgeon: Sariah Serrano MD;  Location: [facility];  Service: Urology    PEG W/TRACHEOSTOMY PLACEMENT N/A 9/8/2023    Procedure: TRACHEOSTOMY WITH INSERTION PEG TUBE;  Surgeon: Ramos Vora MD;  Location: [facility];  Service: General        09/26/23 1305   Note Type   Note type Cancelled Session  (Tuesday 9/26/23)   Cancel Reasons Medical status  (s/p bronch bedside earlier and per RNKelly is lethargic following)   Additional Comments OT orders received and chart review completed. Spoke w/ RNKelly and PT, Chris Mason. Pt is lethargic s/p bronch bedside earlier. Pt is s/p PEG tube placement on 9/25/23. Will cancel and continue to follow as appropriate / schedule allows.      Deborah Singh, OTR/L  [id number]  [id number]

## 2023-09-26 NOTE — ASSESSMENT & PLAN NOTE
· Stable/increased lung consolidations in lower lobes and RML seen on CT imaging 9/24  · Respiratory status unchanged/stable from discharge 9/23     Plan:  · Does not appear actively infected, monitor off of antibiotics  · Mucomyst/albuterol nebs q8h for mucokinesis  · Chest PT, VAP precautions  · Trach suctioning PRN  · Continue nightly ACVC for atelectasis, wean to TC daily  · Trend temperature curve, WBC

## 2023-09-26 NOTE — PLAN OF CARE
Problem: PAIN - ADULT  Goal: Verbalizes/displays adequate comfort level or baseline comfort level  Description: Interventions:  - Encourage patient to monitor pain and request assistance  - Assess pain using appropriate pain scale  - Administer analgesics based on type and severity of pain and evaluate response  - Implement non-pharmacological measures as appropriate and evaluate response  - Consider cultural and social influences on pain and pain management  - Notify physician/advanced practitioner if interventions unsuccessful or patient reports new pain  Outcome: Progressing     Problem: INFECTION - ADULT  Goal: Absence or prevention of progression during hospitalization  Description: INTERVENTIONS:  - Assess and monitor for signs and symptoms of infection  - Monitor lab/diagnostic results  - Monitor all insertion sites, i.e. indwelling lines, tubes, and drains  - Monitor endotracheal if appropriate and nasal secretions for changes in amount and color  - [place] appropriate cooling/warming therapies per order  - Administer medications as ordered  - Instruct and encourage patient and family to use good hand hygiene technique  - Identify and instruct in appropriate isolation precautions for identified infection/condition  Outcome: Progressing  Goal: Absence of fever/infection during neutropenic period  Description: INTERVENTIONS:  - Monitor WBC    Outcome: Progressing     Problem: SAFETY ADULT  Goal: Patient will remain free of falls  Description: INTERVENTIONS:  - Educate patient/family on patient safety including physical limitations  - Instruct patient to call for assistance with activity   - Consult OT/PT to assist with strengthening/mobility   - Keep Call bell within reach  - Keep bed low and locked with side rails adjusted as appropriate  - Keep care items and personal belongings within reach  - Initiate and maintain comfort rounds  - Make Fall Risk Sign visible to staff  - Offer Toileting every 2 Hours, in advance of need  - Initiate/Maintain bed/chair alarm  - Obtain necessary fall risk management equipment  - Apply yellow socks and bracelet for high fall risk patients  - Consider moving patient to room near nurses station  Outcome: Progressing  Goal: Maintain or return to baseline ADL function  Description: INTERVENTIONS:  -  Assess patient's ability to carry out ADLs; assess patient's baseline for ADL function and identify physical deficits which impact ability to perform ADLs (bathing, care of mouth/teeth, toileting, grooming, dressing, etc.)  - Assess/evaluate cause of self-care deficits   - Assess range of motion  - Assess patient's mobility; develop plan if impaired  - Assess patient's need for assistive devices and provide as appropriate  - Encourage maximum independence but intervene and supervise when necessary  - Involve family in performance of ADLs  - Assess for home care needs following discharge   - Consider OT consult to assist with ADL evaluation and planning for discharge  - Provide patient education as appropriate  Outcome: Progressing  Goal: Maintains/Returns to pre admission functional level  Description: INTERVENTIONS:  - Perform BMAT or MOVE assessment daily.   - Set and communicate daily mobility goal to care team and patient/family/caregiver. - Collaborate with rehabilitation services on mobility goals if consulted  - Perform Range of Motion 4 times a day. - Reposition patient every 2 hours. - Out of bed to chair 1 times a day   - Out of bed for toileting  - Record patient progress and toleration of activity level   Outcome: Progressing     Problem: Knowledge Deficit  Goal: Patient/family/caregiver demonstrates understanding of disease process, treatment plan, medications, and discharge instructions  Description: Complete learning assessment and assess knowledge base.   Interventions:  - Provide teaching at level of understanding  - Provide teaching via preferred learning methods  Outcome: Progressing     Problem: GASTROINTESTINAL - ADULT  Goal: Minimal or absence of nausea and/or vomiting  Description: INTERVENTIONS:  - Administer IV fluids if ordered to ensure adequate hydration  - Maintain NPO status until nausea and vomiting are resolved  - Nasogastric tube if ordered  - Administer ordered antiemetic medications as needed  - Provide nonpharmacologic comfort measures as appropriate  - Advance diet as tolerated, if ordered  - Consider nutrition services referral to assist patient with adequate nutrition and appropriate food choices  Outcome: Progressing  Goal: Maintains or returns to baseline bowel function  Description: INTERVENTIONS:  - Assess bowel function  - Encourage oral fluids to ensure adequate hydration  - Administer IV fluids if ordered to ensure adequate hydration  - Administer ordered medications as needed  - Encourage mobilization and activity  - Consider nutritional services referral to assist patient with adequate nutrition and appropriate food choices  Outcome: Progressing  Goal: Maintains adequate nutritional intake  Description: INTERVENTIONS:  - Monitor percentage of each meal consumed  - Identify factors contributing to decreased intake, treat as appropriate  - Assist with meals as needed  - Monitor I&O, weight, and lab values if indicated  - Obtain nutrition services referral as needed  Outcome: Progressing  Goal: Oral mucous membranes remain intact  Description: INTERVENTIONS  - Assess oral mucosa and hygiene practices  - Implement preventative oral hygiene regimen  - Implement oral medicated treatments as ordered  - Initiate Nutrition services referral as needed  Outcome: Progressing     Problem: SKIN/TISSUE INTEGRITY - ADULT  Goal: Skin Integrity remains intact(Skin Breakdown Prevention)  Description: Assess:  -Perform Adria assessment every shift  -Clean and moisturize skin every shift  -Inspect skin when repositioning, toileting, and assisting with ADLS  -Assess under medical devices   -Assess extremities for adequate circulation and sensation     Bed Management:  -Have minimal linens on bed & keep smooth, unwrinkled  -Change linens as needed when moist or perspiring  -Avoid sitting or lying in one position for more than 2 hours while in bed  -Keep HOB at 30degrees     Toileting:  -Offer bedside commode  -Assess for incontinence every 2 hours  -Use incontinent care products after each incontinent episode    Activity:  -Encourage or provide ROM exercises   -Turn and reposition patient every 2 Hours  -Use appropriate equipment to lift or move patient in bed  -Instruct/ Assist with weight shifting every 2 when out of bed in chair  -Consider limitation of chair time 4-6 hour intervals    Skin Care:  -Avoid use of baby powder, tape, friction and shearing, hot water or constrictive clothing  -Relieve pressure over bony prominences  -Do not massage red bony areas    Next Steps:  -Teach patient strategies to minimize risks   -Consider consults to  interdisciplinary teams  Outcome: Progressing  Goal: Incision(s), wounds(s) or drain site(s) healing without S/S of infection  Description: INTERVENTIONS  - Assess and document dressing, incision, wound bed, drain sites and surrounding tissue  - Provide patient and family education  - Perform skin care/dressing changes qshift  Outcome: Progressing  Goal: Pressure injury heals and does not worsen  Description: Interventions:  - Implement low air loss mattress or specialty surface (Criteria met)  - Apply silicone foam dressing  - Instruct/assist with weight shifting every 60 minutes when in chair   - Limit chair time to 4-6 hour intervals  - Use special pressure reducing interventions such as cushion when in chair   - Apply fecal or urinary incontinence containment device   - Perform passive or active ROM every 2-4 hrs  - Turn and reposition patient & offload bony prominences every 2 hours   - Utilize friction reducing device or surface for transfers   - Consider consults to  interdisciplinary teams  - Use incontinent care products after each incontinent episode  - Consider nutrition services referral as needed  Outcome: Progressing     Problem: MUSCULOSKELETAL - ADULT  Goal: Maintain or return mobility to safest level of function  Description: INTERVENTIONS:  - Assess patient's ability to carry out ADLs; assess patient's baseline for ADL function and identify physical deficits which impact ability to perform ADLs (bathing, care of mouth/teeth, toileting, grooming, dressing, etc.)  - Assess/evaluate cause of self-care deficits   - Assess range of motion  - Assess patient's mobility  - Assess patient's need for assistive devices and provide as appropriate  - Encourage maximum independence but intervene and supervise when necessary  - Involve family in performance of ADLs  - Assess for home care needs following discharge   - Consider OT consult to assist with ADL evaluation and planning for discharge  - Provide patient education as appropriate  Outcome: Progressing  Goal: Maintain proper alignment of affected body part  Description: INTERVENTIONS:  - Support, maintain and protect limb and body alignment  - Provide patient/ family with appropriate education  Outcome: Progressing     Problem: MOBILITY - ADULT  Goal: Maintain or return to baseline ADL function  Description: INTERVENTIONS:  -  Assess patient's ability to carry out ADLs; assess patient's baseline for ADL function and identify physical deficits which impact ability to perform ADLs (bathing, care of mouth/teeth, toileting, grooming, dressing, etc.)  - Assess/evaluate cause of self-care deficits   - Assess range of motion  - Assess patient's mobility; develop plan if impaired  - Assess patient's need for assistive devices and provide as appropriate  - Encourage maximum independence but intervene and supervise when necessary  - Involve family in performance of ADLs  - Assess for home care needs following discharge   - Consider OT consult to assist with ADL evaluation and planning for discharge  - Provide patient education as appropriate  Outcome: Progressing  Goal: Maintains/Returns to pre admission functional level  Description: INTERVENTIONS:  - Perform BMAT or MOVE assessment daily.   - Set and communicate daily mobility goal to care team and patient/family/caregiver. - Collaborate with rehabilitation services on mobility goals if consulted  - Perform Range of Motion 4 times a day. - Reposition patient every 2 hours.   - Out of bed to chair 1 times a day   - Out of bed for toileting  - Record patient progress and toleration of activity level   Outcome: Progressing     Problem: Prexisting or High Potential for Compromised Skin Integrity  Goal: Skin integrity is maintained or improved  Description: INTERVENTIONS:  - Identify patients at risk for skin breakdown  - Assess and monitor skin integrity  - Assess and monitor nutrition and hydration status  - Monitor labs   - Assess for incontinence   - Turn and reposition patient  - Assist with mobility/ambulation  - Relieve pressure over bony prominences  - Avoid friction and shearing  - Provide appropriate hygiene as needed including keeping skin clean and dry  - Evaluate need for skin moisturizer/barrier cream  - Collaborate with interdisciplinary team   - Patient/family teaching  - Consider wound care consult   Outcome: Progressing     Problem: RESPIRATORY - ADULT  Goal: Achieves optimal ventilation and oxygenation  Description: INTERVENTIONS:  - Assess for changes in respiratory status  - Assess for changes in mentation and behavior  - Position to facilitate oxygenation and minimize respiratory effort  - Oxygen administered by appropriate delivery if ordered  - Initiate smoking cessation education as indicated  - Encourage broncho-pulmonary hygiene including cough, deep breathe, Incentive Spirometry  - Assess the need for suctioning and aspirate as needed  - Assess and instruct to report SOB or any respiratory difficulty  - Respiratory Therapy support as indicated  Outcome: Progressing     Problem: Nutrition/Hydration-ADULT  Goal: Nutrient/Hydration intake appropriate for improving, restoring or maintaining nutritional needs  Description: Monitor and assess patient's nutrition/hydration status for malnutrition. Collaborate with interdisciplinary team and initiate plan and interventions as ordered. Monitor patient's weight and dietary intake as ordered or per policy. Utilize nutrition screening tool and intervene as necessary. Determine patient's food preferences and provide high-protein, high-caloric foods as appropriate.      INTERVENTIONS:  - Monitor intake, urinary output, labs, and treatment plans  - Assess nutrition and hydration status and recommend course of action  - Recommend/ encourage appropriate nutritional supplements, and vitamin/mineral supplements  - Recommend, monitor, and adjust tube feedings based on assessed needs  - Assess need for intravenous fluids  - Provide specific nutrition/hydration education as appropriate  - Include patient/family/caregiver in decisions related to nutrition  Outcome: Progressing

## 2023-09-26 NOTE — PHYSICAL THERAPY NOTE
PHYSICAL THERAPY     09/26/23 1440   Note Type   Note type Cancelled Session   Cancel Reasons Medical status  (patient lethargic, recent broncoscopy. will re-attempt at a later time.  Nurse deferred PT)   Licensure   [place] License Number  Sima Carter PT [id number]

## 2023-09-26 NOTE — ASSESSMENT & PLAN NOTE
Recently admitted for acute respiratory failure with notable following events:  • 8/12-8/13: intubated for airway protection when acutely had GCS of 5 at [facility]  • 8/23 CTA PE study: PE in the right lower lobe segmental pulmonary artery  • 8/24 RRT > intubated for hypoxia + airway protection   • 8/25: Bronch: Some mucus plugging present on the left.   • 8/26: CT chest: Complete right lower lobe and near complete left lower lobe atelectasis + edema  • 8/31: Completed Zosyn x 7 days   • 9/8: Jenny Sonia placed        Plan:  • Trach collar during day 35%, wean FiO2 goal SpO2 >92  • ACVC 18/500/40/8 HS   • Follow up with Dr. Karl Ventura (pulm) outpatient for f/u with home vent/trach management, vs continuation of care at [facility] given complications experienced at home after discharge yesterday  • Continue aggressive pulmonary hygiene   • Follow up with [facility]

## 2023-09-27 LAB
ARTERIAL PATENCY WRIST A: YES
BASE EX.OXY STD BLDV CALC-SCNC: 91.3 % (ref 60–80)
BASE EXCESS BLDV CALC-SCNC: 3.6 MMOL/L
BODY TEMPERATURE: 97.2 DEGREES FEHRENHEIT
HCO3 BLDV-SCNC: 29.3 MMOL/L (ref 24–30)
HOROWITZ INDEX BLDA+IHG-RTO: 40 MM[HG]
MISCELLANEOUS LAB TEST RESULT: NORMAL
O2 CT BLDV-SCNC: 15.2 ML/DL
PCO2 BLDV: 49.7 MM HG (ref 42–50)
PEEP RESPIRATORY: 8 CM[H2O]
PH BLDV: 7.39 [PH] (ref 7.3–7.4)
PO2 BLDV: 72.6 MM HG (ref 35–45)
VENT AC: 16
VENT- AC: AC
VT SETTING VENT: 500 ML

## 2023-09-27 PROCEDURE — 82805 BLOOD GASES W/O2 SATURATION: CPT | Performed by: STUDENT IN AN ORGANIZED HEALTH CARE EDUCATION/TRAINING PROGRAM

## 2023-09-27 PROCEDURE — 94760 N-INVAS EAR/PLS OXIMETRY 1: CPT

## 2023-09-27 PROCEDURE — 94003 VENT MGMT INPAT SUBQ DAY: CPT

## 2023-09-27 PROCEDURE — 94150 VITAL CAPACITY TEST: CPT

## 2023-09-27 PROCEDURE — 97110 THERAPEUTIC EXERCISES: CPT

## 2023-09-27 PROCEDURE — 94664 DEMO&/EVAL PT USE INHALER: CPT

## 2023-09-27 PROCEDURE — 97163 PT EVAL HIGH COMPLEX 45 MIN: CPT

## 2023-09-27 PROCEDURE — 94640 AIRWAY INHALATION TREATMENT: CPT

## 2023-09-27 PROCEDURE — 97167 OT EVAL HIGH COMPLEX 60 MIN: CPT

## 2023-09-27 PROCEDURE — 94668 MNPJ CHEST WALL SBSQ: CPT

## 2023-09-27 PROCEDURE — 99291 CRITICAL CARE FIRST HOUR: CPT | Performed by: STUDENT IN AN ORGANIZED HEALTH CARE EDUCATION/TRAINING PROGRAM

## 2023-09-27 PROCEDURE — 94669 MECHANICAL CHEST WALL OSCILL: CPT

## 2023-09-27 RX ADMIN — APIXABAN 5 MG: 5 TABLET, FILM COATED ORAL at 08:51

## 2023-09-27 RX ADMIN — THIAMINE HCL TAB 100 MG 100 MG: 100 TAB at 08:51

## 2023-09-27 RX ADMIN — ACETYLCYSTEINE 600 MG: 200 SOLUTION ORAL; RESPIRATORY (INHALATION) at 23:22

## 2023-09-27 RX ADMIN — FLUOXETINE 10 MG: 10 CAPSULE ORAL at 08:52

## 2023-09-27 RX ADMIN — CHLORHEXIDINE GLUCONATE 15 ML: 1.2 RINSE ORAL at 21:36

## 2023-09-27 RX ADMIN — Medication 1000 UNITS: at 08:51

## 2023-09-27 RX ADMIN — ACETYLCYSTEINE 600 MG: 200 SOLUTION ORAL; RESPIRATORY (INHALATION) at 07:37

## 2023-09-27 RX ADMIN — ALBUTEROL SULFATE 2.5 MG: 2.5 SOLUTION RESPIRATORY (INHALATION) at 23:22

## 2023-09-27 RX ADMIN — ALBUTEROL SULFATE 2.5 MG: 2.5 SOLUTION RESPIRATORY (INHALATION) at 16:17

## 2023-09-27 RX ADMIN — CHLORHEXIDINE GLUCONATE 15 ML: 1.2 RINSE ORAL at 08:51

## 2023-09-27 RX ADMIN — ALBUTEROL SULFATE 2.5 MG: 2.5 SOLUTION RESPIRATORY (INHALATION) at 07:37

## 2023-09-27 RX ADMIN — ACETYLCYSTEINE 600 MG: 200 SOLUTION ORAL; RESPIRATORY (INHALATION) at 16:17

## 2023-09-27 RX ADMIN — APIXABAN 5 MG: 5 TABLET, FILM COATED ORAL at 17:29

## 2023-09-27 NOTE — UTILIZATION REVIEW
NOTIFICATION OF INPATIENT ADMISSION   AUTHORIZATION REQUEST   SERVICING FACILITY:   [address] [place] [address]  Tax ID: [id number]  NPI: [id number] ATTENDING PROVIDER:  Attending Name and NPI#: Eusebia Stubbs [place] [[id number]]  Address: [address] [place] [address]  Phone: [phone]   ADMISSION INFORMATION:  [facility]   Place of Service Code: 21  Inpatient Admission Date/Time: 9/26/23  2:08 PM  Discharge Date/Time: No discharge date for patient encounter. Admitting Diagnosis Code/Description:  PEG tube malfunction ([address]) [K94.23]  Feeding tube dysfunction [T85.598A]     UTILIZATION REVIEW CONTACT:  Misa Walls Utilization   Network Utilization Review Department  Phone: [phone]  Fax [phone]  Email: [email or web address]  Contact for approvals/pending authorizations, clinical reviews, and discharge. PHYSICIAN ADVISORY SERVICES:  Medical Necessity Denial & Cyqn-qo-Mucx Review  Phone: [phone]  Fax: [phone]  Email: [email or web address]

## 2023-09-27 NOTE — PHYSICAL THERAPY NOTE
PHYSICAL THERAPY EVALUATION/TREATMENT     09/27/23 1235   PT Last Visit   PT Visit Date 09/27/23   Note Type   Note type Evaluation   Pain Assessment   Pain Assessment Tool 0-10   Pain Score No Pain   Restrictions/Precautions   Other Precautions Cognitive; Chair Alarm; Bed Alarm;Multiple lines;O2;Fall Risk  (Patient with trach and PEG tube and vent dependent at night)   Home Living   Type of [address] [place] [address]  Two level;Bed/bath upstairs;Stairs to enter with rails  (7 stairs to enter)   [place]; Hospital bed   Additional Comments Patient is a poor historian at this time with altered mental status, patient reports ambulating independently in the home prior to admission although this is known to be incorrect as patient is weak deconditioned from hospitalization and with recent discharge from the hospital   Prior Function   Level of [place] Needs assistance with ADLs; Needs assistance with functional mobility; Needs assistance with IADLS   Lives With [facility] Help From Friend(s); Family   IADLs Family/Friend/Other provides transportation; Family/Friend/Other provides meals; Family/Friend/Other provides medication management   Comments Patient is a poor historian unable to offer reliable information about prior level of functioning although patient recently discharged from hospital and was requiring assist of 2 to stay and with roller walker unable to ambulate at that time   General   Additional Pertinent History Chart reviewed, patient admitted with PEG tube malfunction. Patient was a recent discharge to home with trach, PEG tube and ventilator dependence at night as well as oxygen use during the day. Patient had significant limitations to functional mobility, requiring assist of 2 persons for standing activity only and was not ambulatory although family took patient home and is now returned due to feeding tube malfunction.   Patient would benefit from discharge to postacute rehab upon discharge from the hospital to regain function and strength   Family/Caregiver Present No   Cognition   Overall Cognitive Status Impaired   Arousal/Participation Cooperative   Attention Attends with cues to redirect   Orientation Level Oriented to person;Oriented to place   Following Commands Follows one step commands with increased time or repetition   Subjective   Subjective Patient nonverbal due to trach with oxygen mask although communicates with lipreading by therapist.  Patient states no pain at this time   RLE Assessment   RLE Assessment   (Range of motion within functional limits, strength 3/3+)   LLE Assessment   LLE Assessment   (Range of motion within functional limits, strength 3/3+)   Coordination   Movements are Fluid and Coordinated 0   Coordination and Movement Description Decreased coordination and balance with all standing, transfers activities   Bed Mobility   Additional Comments Patient sitting out of bed in bedside chair upon arrival by therapist having been transferred by nursing staff with mechanical lift   Transfers   Sit to Stand 2  Maximal assistance   Additional items Assist x 2;Verbal cues   Stand to Sit 2  Maximal assistance   Additional items Assist x 2;Verbal cues   Additional Comments Patient unable to obtain fully upright standing with max assist of 2 persons therefore gait is not able to be assessed at this time. Patient has been nonambulatory since prior admission   Balance   Static Sitting Fair -   Dynamic Sitting Poor +   Static Standing Poor -   Dynamic Standing Poor -   Activity Tolerance   Activity Tolerance Patient limited by fatigue;Treatment limited secondary to medical complications (Comment); Other (Comment)  (Seen in ICU with multiple lines)   Nurse Made Aware Yes   Assessment   Prognosis Good   Problem List Decreased range of motion;Decreased strength;Decreased endurance; Impaired balance;Decreased mobility; Decreased coordination;Decreased cognition; Impaired judgement  (Trach and peg tube present as well as vent dependent at night)   Assessment Patient seen for Physical Therapy evaluation. Patient admitted with Migration of percutaneous endoscopic gastrostomy (PEG) tube ([address]). Comorbidities affecting patient's physical performance include: . Personal factors affecting patient at time of initial evaluation include: lives in two story house, inability to ambulate household distances, inability to navigate community distances, inability to navigate level surfaces without external assistance, inability to perform dynamic tasks in community, decreased cognition, limited home support, inability to perform physical activity, limited insight into impairments, inability to perform ADLS, inability to perform IADLS  and inability to live alone. Prior to admission, patient was requiring assist for functional mobility with walker/WC, requiring assist for ADLS, requiring assist for IADLS and home with family assist.  Please find objective findings from Physical Therapy assessment regarding body systems outlined above with impairments and limitations including weakness, decreased ROM, impaired balance, decreased endurance, impaired coordination, gait deviations, decreased activity tolerance, decreased functional mobility tolerance, fall risk and decreased cognition. The Barthel Index was used as a functional outcome tool presenting with a score of Barthel Index Score: 15 today indicating marked limitations of functional mobility and ADLS. Patient's clinical presentation is currently unstable/unpredictable as seen in patient's presentation of vital sign response, changing level of pain, varying levels of cognitive performance, increased fall risk, new onset of impairment of functional mobility, decreased endurance and new onset of weakness. Pt would benefit from continued Physical Therapy treatment to address deficits as defined above and maximize level of functional mobility.  As demonstrated by objective findings, the assigned level of complexity for this evaluation is high. The patient's -[facility] Basic Mobility Inpatient Short Form Raw Score is 7. A Raw score of less than or equal to 16 suggests the patient may benefit from discharge to post-acute rehabilitation services. Please also refer to the recommendation of the Physical Therapist for safe discharge planning. Goals   Patient Goals Patient nonverbal but able to mouth "I want to go home"   STG Expiration Date 10/04/23   Short Term Goal #1 Transfers and gait with roller walker with mod assist of 2   Short Term Goal #2 Gait endurance to 2-3 steps, strength bilateral lower extremities 3+/5   LTG Expiration Date 10/11/23   Long Term Goal #1 Strength bilateral lower extremities 3+/4 -   Long Term Goal #2 Transfers and gait with roller walker with min assist of 2 for endurance of 5 to 10 feet   Plan   Treatment/Interventions ADL retraining;Functional transfer training;LE strengthening/ROM; Therapeutic exercise; Endurance training;Cognitive reorientation;Patient/family training;Equipment eval/education; Bed mobility;Gait training; Compensatory technique education   PT Frequency Other (Comment)  (5 times per week)   Recommendation   PT Discharge Recommendation Post acute rehabilitation services   -[facility] Basic Mobility Inpatient   Turning in Flat Bed Without Bedrails 2   Lying on Back to Sitting on Edge of Flat Bed Without Bedrails 1   Moving Bed to Chair 1   Standing Up From Chair Using Arms 1   Walk in Room 1   Climb 3-5 Stairs With Railing 1   Basic Mobility Inpatient Raw Score 7   Turning Head Towards Sound 3   Follow Simple Instructions 3   Low Function Basic Mobility Raw Score  13   Low Function Basic Mobility Standardized Score  20.14   Highest Level Of Mobility   JH-HLM Goal 2: Bed activities/Dependent transfer   JH-HLM Achieved 4: Move to chair/commode   Barthel Index   Feeding 0   Bathing 0   Grooming Score 0   Dressing Score 5   Bladder Score 5   Bowels Score 5   Toilet Use Score 0   Transfers (Bed/Chair) Score 0   Mobility (Level Surface) Score 0   Stairs Score 0   Barthel Index Score 15   Additional Treatment Session   Start Time 1220   End Time 1235   Treatment Assessment S: Patient nonverbal with trach present on trach mask for oxygen O: Bilateral lower extremity exercise completed as listed below A: Patient will benefit from continued physical therapy with progression as tolerated and will require postacute rehab services for recovery of function due to extended illness, weakness, gait dysfunction, vent dependency at night. Patient will also benefit from increasing functional mobility with clinical staff as well   Exercises   Hamstring Stretch Sitting;5 reps;PROM; Bilateral   Hip Flexion Sitting;10 reps;Bilateral  (Alternating)   Hip Abduction Sitting;10 reps;Bilateral  (Alternating)   Knee AROM Long Arc Quad Sitting;10 reps;Bilateral  (Alternating)   Ankle Pumps Sitting;20 reps;Bilateral   Balance training  Sitting and standing balance activity   Licensure   [place] License Number  Breanna Cardenas, PT 4 0 [id number]

## 2023-09-27 NOTE — OCCUPATIONAL THERAPY NOTE
Occupational Therapy Evaluation     Patient Name: Shayan Lawler  USKORINA Date: 9/27/2023  Problem List  Principal Problem:    Migration of percutaneous endoscopic gastrostomy (PEG) tube [facility])  Active Problems:    Umbilical hernia without obstruction and without gangrene    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[address])    History of LVH (left ventricular hypertrophy)    Neuromuscular weakness (HCC)    Depression    Acute pulmonary embolism without acute cor pulmonale (HCC)    Chronic respiratory failure with hypoxia and hypercapnia (HCC)    S/P percutaneous endoscopic gastrostomy (PEG) tube placement ([address])    Status post tracheostomy ([address])    Impaired physical mobility    Multifocal lung consolidation ([address])    Past Medical History  Past Medical History:   Diagnosis Date    Anxiety     Cancer ([address])     Depression     Psychiatric disorder     bipolar     Past Surgical History  Past Surgical History:   Procedure Laterality Date    IR BIOPSY LYMPH NODE  1/20/2023    IR GASTROSTOMY TUBE PLACEMENT  9/25/2023    IR LUMBAR PUNCTURE  9/6/2023    IR PORT PLACEMENT  3/14/2023    ORCHIECTOMY Left 12/14/2022    Procedure: ORCHIECTOMY INGUINAL;  Surgeon: Amber Ballesteros MD;  Location: [facility];  Service: Urology    PEG W/TRACHEOSTOMY PLACEMENT N/A 9/8/2023    Procedure: TRACHEOSTOMY WITH INSERTION PEG TUBE;  Surgeon: Sudheer Gibbs MD;  Location: [facility];  Service: General           09/27/23 1206   OT Last Visit   OT Visit Date 09/27/23  (Wednesday)   Note Type   Note type Evaluation   Pain Assessment   Pain Assessment Tool 0-10   Pain Score No Pain   Patient's Stated Pain Goal No pain   Restrictions/Precautions   Weight Bearing Precautions Per Order No   Other Precautions Cognitive; Chair Alarm; Bed Alarm;Multiple lines;Telemetry;O2;Fall Risk;Pain  (s/p PEG tube placement, trach collar)   Home Living   Type of Home House   Home Layout Two level;Bed/bath upstairs   Bathroom Shower/Tub Tub/shower unit   Bathroom Toilet Standard [address]; Hospital bed; Other (Comment)  (recliner lift chair)   Additional Comments Pt reports living w/ significant other / girlfriend in [facility]   Prior Function   Level of [place] Needs assistance with IADLS;Needs assistance with ADLs   Lives With Family   Receives Help From Family  (pt denies HHOT/PT)   IADLs Family/Friend/Other provides transportation; Family/Friend/Other provides meals; Family/Friend/Other provides medication management   Falls in the last 6 months 0   Comments Questionable historian upon eval. Reports I w/ ADLs and functional mobility. Lifestyle   Autonomy Pt is a questionable historian. Needs assistance w/ ADL/ IADL   Reciprocal Relationships Supportive family / significant other   Intrinsic Gratification Pt is a [employer]   General   Additional Pertinent History Significant PMH impacting his occupational performance includes HTN, obesity, Bipolar disorder, testicular cancer s/p chemo, anxiety, chronic respiratory failure w/ hypoxia/ hypercapnia, s/p PEG/ trach (09/8/23). Personal and environmental factors impacting performance includes inability to complete IADLs, difficulty managing stairs, multi level home, recent admission (s); supports / strengths   Family/Caregiver Present No   Additional General Comments s/p IR PEG tube placement 9/25/23 and bronch bedside 09/26/23   ADL   Where Assessed Chair   Eating Assistance Unable to assess   Eating Deficit Other (Comment)  (PEG tube feed)   Grooming Assistance 4  Minimal Assistance   Grooming Deficit Setup;Verbal cueing;Supervision/safety; Increased time to complete  (seated OOB In chair w/ + time after set- up to wash face)   UB Bathing Assistance Unable to assess   LB Bathing Assistance Unable to assess  (anticipate max A based on fxal obs skills, clinical judgement)   UB Dressing Assistance 3  Moderate Assistance   UB Dressing Deficit Setup;Supervision/safety;Verbal cueing; Increased time to complete;Pull around back; Fasteners   LB Dressing Assistance 3  Moderate Assistance  (seated in bedside recliner chair w/ min A to maintain balance and + time to ext rot /flex hip, knee to cross LE over opposite)   LB Dressing Deficit Setup;Steadying;Verbal cueing;Supervision/safety; Increased time to complete; Don/doff R sock; Don/doff L sock   Toileting Assistance  Unable to assess   Toileting Deficit Use of bedpan/urinal setup   Bed Mobility   Supine to Sit Unable to assess   Sit to Supine Unable to assess   Additional Comments Pt seated OOB In chair upon arrival and post eval w/ needs met, call bell in reach   Transfers   Sit to Stand (S)    (max A x2 sit to partial stand from bedside recliner chair)   Additional items Assist x 2; Increased time required;Verbal cues; Bedrails;Armrests  (instruction / cues for hand placement)   Stand to Sit (S)    (max A x 2 partial stand to sit)   Additional items Assist x 2; Increased time required; Bedrails;Armrests; Verbal cues   Stand pivot Unable to assess   Additional Comments RN utilized ceiling lift to facilitate OOB to bedside recliner chair. Pt performed sit to partial satnd 3 X from recliner chair. Unable to complete / maintain standing and benefits from forward rocking momentum   Functional Mobility   Additional Comments NT. Will continue to assess   Balance   Static Sitting Fair -   Static Standing Zero   Ambulatory   (NT. Will continue to assess)   Activity Tolerance   Activity Tolerance Patient limited by fatigue; Other (Comment)  (multiple lines in ICU; trach collar, PEG, telemetry)   Medical Staff Made Aware care coordination w/ PTKathia due to decreased activity tolerance, skilled physical assistance required and regression from baseline   Nurse Made Aware spoke w/ RNStarr   RUE Assessment   RUE Assessment   (limited end / terminal AROM AG; able to participate in ADLs)   RUE Strength   RUE Overall Strength Deficits;Due to cognitive deficits  (strength grossly 3+/5 distally)   LUE Assessment   LUE Assessment   (limited end / terminal AROM AG; able to participate in ADLs)   LUE Strength   LUE Overall Strength Deficits;Due to cognitive deficits  (strength grossly 3+/5 distally)   Hand Function   Gross Motor Coordination Functional   Fine Motor Coordination Impaired   Hand Function Comments R hand dominance   Sensation   Light Touch   (responded to light touch)   Psychosocial   Patient Behaviors/Mood Calm; Cooperative   Cognition   Overall Cognitive Status (S)  Impaired   Arousal/Participation Responsive; Cooperative   Attention Attends with cues to redirect   Orientation Level Oriented to person  (oriented to month, generally to place)   Memory Decreased short term memory;Decreased recall of recent events;Decreased recall of precautions   Following Commands Follows one step commands with increased time or repetition   Comments Identified pt by full name and birthdate. Responsive and cooperative. Agreeable to participate. Questionable historian and reports I w/ ADLs and functional mobility prior to admission. Assessment   Limitation Decreased ADL status; Decreased UE strength;Decreased cognition;Decreased endurance;Decreased self-care trans;Decreased high-level ADLs; Decreased fine motor control  (impaired activity tolerance, insight into deficits, forward functional reach, balance, standing tolerance, problem solving, memory / recall)   Assessment Pt is a 46yo male re-admitted to [facility] on 9/24/23 from due to PEG site leakage. Diagnosed w/ migration of percutaneous endoscopic gastrostomy tube and is s/p IR PEG Tube placement on 9/25/23. Pt recently admitted 08/24/23 to 09/23/23 due to altered mental status, hypoxia, and weakness. Pt required intubation and trach / PEG on 09/8/23. Pt DC home despite rehab recommendation. Pt lives w/ significant other in 2 [place] w/ 7-8 ADIEL at baseline. At baseline prior to rehab and recent admission(s) pt completing ADLs independently.  Prior to re-admission, pt required required assistance w/ ADL/ IADL. Upon eval, pt alert and oriented to person, generally to place. Able to follow directions during ADLs. Questionable historian and unable to provide consistent social history. Pt required min A to complete grooming, mod A UBD, mod A LBD, max A x2 sit to partial stand. Pt demonstrated B UE AROM WFL to complete ADLs. Pt is completing ADLs below baseline level of I and would benefit from OT in acute care to maximize functional independence. Recommend post acute rehab when medically stable. Will continue to follow   Goals   Patient Goals Pt stated that he would like to go home   Plan   Treatment Interventions ADL retraining;Functional transfer training;UE strengthening/ROM; Endurance training;Equipment evaluation/education;Patient/family training; Compensatory technique education;Continued evaluation; Energy conservation; Activityengagement   Goal Expiration Date 10/17/23   Recommendation   OT Discharge Recommendation Post acute rehabilitation services   AM-PAC Daily Activity Inpatient   Lower Body Dressing 2   Bathing 2   Toileting 2   Upper Body Dressing 2   Grooming 3   Eating 1   Daily Activity Raw Score 12   Daily Activity Standardized Score (Calc for Raw Score >=11) 30.6   AM-PAC Applied Cognition Inpatient   Following a Speech/Presentation 2   Understanding Ordinary Conversation 3   Taking Medications 1   Remembering Where Things Are Placed or Put Away 2   Remembering List of 4-5 Errands 1   Taking Care of Complicated Tasks 1   Applied Cognition Raw Score 10   Applied Cognition Standardized Score 24.98   Barthel Index   Feeding 0   Bathing 0   Grooming Score 0   Dressing Score 5   Bladder Score 5   Bowels Score 0   Toilet Use Score 0   Transfers (Bed/Chair) Score 0   Mobility (Level Surface) Score 0   Stairs Score 0   Barthel Index Score 10   End of Consult   Patient Position at End of Consult Bedside chair; All needs within reach   Nurse Communication Nurse aware of consult   Licensure   [address] [place] [address] Number  Chanelle Nice, OTR/L [id number]        The patient's raw score on the AM-PAC Daily Activity Inpatient Short Form is 12. A raw score of less than 19 suggests the patient may benefit from discharge to post-acute rehabilitation services. Please refer to the recommendation of the Occupational Therapist for safe discharge planning. Pt goals to be met by 10/17/23 to max I w/ ADLs and minimize burden of care to return home w/ family includes:    -Pt will complete bed mobility supine <> sit w/ mod A x1 to max I w/ ADLs    -Pt will complete LBD w/ min A to max I and minimize burden of care    -Pt will complete UBD w/ S after set- up seated unsupported w/ at least fair balance    -Pt will demonstrate improved activity and sitting tolerance OOB In chair for at least 4 hours a day    -Pt will complete UB bathing w/ S after set- up in supported sitting    -Pt will complete LB bathing w/ min A to max I and minimize burden of care    -Pt will complete sit <> stand w/ mod A x1 to max I w/ LBD and toileting to return home using LRAD    -Pt will demonstrate improved functional standing tolerance for at least 5 minutes using LRAD w/ at least fair- balance to max I w/ ADLs and minimize burden of care    -Pt will demonstrate good attention and participation in ongoing eval of functional transfers / functional mobility using LRAD, DME as needed to assist in DC Planning    -Pt will demonstrate good attention and participation in ongoing eval of functional cognition to assist in DC planning    -Pt will consistently follow 2 step directions during ADLs w/ good recall.     Chanelle Tex, OTR/L  [id number]  [id number]

## 2023-09-27 NOTE — ASSESSMENT & PLAN NOTE
· Stable/increased lung consolidations in lower lobes and RML seen on CT imaging 9/24  · 9/26 Bronched with scant secretions     Plan:  · Mucomyst/albuterol nebs q8h for mucokinesis  · Chest PT, VAP precautions  · Trach suctioning PRN  · Continue nightly ACVC for atelectasis, wean to TC daily  · Trend temperature curve, WBC

## 2023-09-27 NOTE — ASSESSMENT & PLAN NOTE
Recently admitted for acute respiratory failure with notable following events:  • 8/12-8/13: intubated for airway protection when acutely had GCS of 5 at [facility]  • 8/23 CTA PE study: PE in the right lower lobe segmental pulmonary artery  • 8/24 RRT > intubated for hypoxia + airway protection   • 8/25: Bronch: Some mucus plugging present on the left.   • 8/26: CT chest: Complete right lower lobe and near complete left lower lobe atelectasis + edema  • 8/31: Completed Zosyn x 7 days   • 9/8: Amadeo List placed  • 9/26: Bronch: scant secretions in right lower lobe; BAL right lower lobe       Plan:  • 35% Trach collar during day, wean FiO2 goal SpO2 >92  • ACVC 16/500/40/8 HS   • Follow up with Dr. Stanford Lopez (pulm) outpatient for f/u with home vent/trach management, vs continuation of care at [facility], [facility] given complications experienced at home after discharge yesterday  • Continue aggressive pulmonary hygiene   • Follow up with [facility]UCENA RDZ

## 2023-09-27 NOTE — ASSESSMENT & PLAN NOTE
· Brought to ED 9/24 for concern of PEG dislodgement and leaking  · CT abdomen completed: The PEG tube is dislodged and no longer within the stomach. It terminates within the abdominal wall and the balloon still appears inflated. There is some haziness within the subcutaneous fat and muscular layer surrounding the site of the tube.   · 9/25 fluro guided 18F peg tube placement by IR

## 2023-09-27 NOTE — ASSESSMENT & PLAN NOTE
Testicular cancer 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 • Began Chemo in March 2023: Etoposide and Cisplatin with plan for 4 cycles, 5 days every 21 days  • Did not complete chemo treatment due to adverse effects  ?  After 3rd cycle reported double vision, labile affect and right sided weakness and therefore chemotherapy was stopped on 5/26/23  • Outpatient follow up with Heme/Onc

## 2023-09-27 NOTE — PLAN OF CARE
Problem: OCCUPATIONAL THERAPY ADULT  Goal: Performs self-care activities at highest level of function for planned discharge setting. See evaluation for individualized goals. Description: Treatment Interventions: ADL retraining, Functional transfer training, UE strengthening/ROM, Endurance training, Equipment evaluation/education, Patient/family training, Compensatory technique education, Continued evaluation, Energy conservation, Activityengagement          See flowsheet documentation for full assessment, interventions and recommendations. Note: Limitation: Decreased ADL status, Decreased UE strength, Decreased cognition, Decreased endurance, Decreased self-care trans, Decreased high-level ADLs, Decreased fine motor control (impaired activity tolerance, insight into deficits, forward functional reach, balance, standing tolerance, problem solving, memory / recall)     Assessment: Pt is a 48yo male re-admitted to [facility] on 9/24/23 from due to PEG site leakage. Diagnosed w/ migration of percutaneous endoscopic gastrostomy tube and is s/p IR PEG Tube placement on 9/25/23. Pt recently admitted 08/24/23 to 09/23/23 due to altered mental status, hypoxia, and weakness. Pt required intubation and trach / PEG on 09/8/23. Pt DC home despite rehab recommendation. Pt lives w/ significant other in 2 SH w/ 7-8 ADIEL at baseline. At baseline prior to rehab and recent admission(s) pt completing ADLs independently. Prior to re-admission, pt required required assistance w/ ADL/ IADL. Upon eval, pt alert and oriented to person, generally to place. Able to follow directions during ADLs. Questionable historian and unable to provide consistent social history. Pt required min A to complete grooming, mod A UBD, mod A LBD, max A x2 sit to partial stand. Pt demonstrated B UE AROM WFL to complete ADLs. Pt is completing ADLs below baseline level of I and would benefit from OT in acute care to maximize functional independence.  Recommend post acute rehab when medically stable.  Will continue to follow     OT Discharge Recommendation: Post acute rehabilitation services

## 2023-09-27 NOTE — ASSESSMENT & PLAN NOTE
Patient initially presented to [facility] on 8/23 with hypoxia and encephalopathy. Acute change in GCS prompted rapid response and subsequent intubation for airway protection. Of note, pt w/ recent hospitalization to [facility]"" 8/11-8/17 for encephalopathy with unclear etiology despite extensive workup but Wernicke's encephalopathy was on the differential.     •  [place] workup 8/11:  ? Rapid response and intubation on 8/12 for airway protection, extubated on 8/13  ? CT head 8/11: No acute intracranial abnormality. Stable small bilateral subcortical hypoattenuating foci. No associated mass effect. ? MRI 8/12: No acute intracranial abnormality. Stable nonspecific enhancement along the anterior floor of the third ventricle/hypothalamus compared to March 2023 study. Findings below:  ? VEEG 8/12: negative for seizure activity. Possible findings relating to underlying sleep disorder   ? LP: Grossly negative overall  ? DSPO: Treated with high dose thiamine. Psych evaluated, possible bipolar disorder. Discharged to home with his sister with visiting nurses, ST/PT/OT  • [facility] 8/23: Presented with hypoxia found by visiting nurse SPO2 in 80's  ? 8/24: RRT > intubated for airway protection   ? Transferred to [facility] on 8/24  • [facility] 8/24-9/23  ? CT head 8/24: No acute intracranial abnormality  ? 8/25 EEG: No evidence of electrographic seizures. Mild background slowing suggestive of underlying cerebral dysfunction from toxic/metablic/infectious/hypoxic encephalopathy. Clinical correlation is recommended  ? Ach binding and blocking Ab negative   ? MUSK Ab and ACHR modulating Ab neg  ? Serum anti-TPO and DARIN Ab's, RPR, neg. CSF paraneoplastic panel neg  ? Varicella PCR neg  ? Toxoplasma antibodies negative  ? Rheum labs negative  ? 9/6 repeat LP cultures negative   ? 9/7 Completed 7 days of Ceftriaxone to cover for possible neurosyphillis  ? MRI 9/9: No acute intracranial pathology.  Unremarkable MRI of the orbits. Improving enhancement of the hypothalamus. 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 ? 9/10 completed 3 days pulse dose steroids  ?  9/11 completed 5 days IVIG  • 9/15 EMG revealed no acute motor neuron disease      Plan:  • F/u outpatient with neurology in 4 weeks ; no clear diagnosis at this time  • PT/OT while admitted  • Would recommend discharge to an acute care facility with daily PT/OT capabilities to optimize patient's recovery

## 2023-09-27 NOTE — RESPIRATORY THERAPY NOTE
Patient placed on vent, full support settings for HS. No issues overnight. Sync with vent, at times overbreathing set rate. See flowsheet for full settings.

## 2023-09-27 NOTE — PROGRESS NOTES
[address]  Progress Note  Name: Mary Anne Dutta  MRN: [id number]  Unit/Bed#: ICU 08 I Date of Admission: 9/24/2023   Date of Service: 9/26/2023 I Hospital Day: 0    Assessment/Plan   * Migration of percutaneous endoscopic gastrostomy (PEG) tube [facility])  Assessment & Plan  · Brought to ED 9/24 for concern of PEG dislodgement and leaking  · CT abdomen completed: The PEG tube is dislodged and no longer within the stomach. It terminates within the abdominal wall and the balloon still appears inflated. There is some haziness within the subcutaneous fat and muscular layer surrounding the site of the tube. · 9/25 fluro guided 18F peg tube placement by IR    Chronic respiratory failure with hypoxia and hypercapnia [facility])  Assessment & Plan  Recently admitted for acute respiratory failure with notable following events:  • 8/12-8/13: intubated for airway protection when acutely had GCS of 5 at [facility]  • 8/23 CTA PE study: PE in the right lower lobe segmental pulmonary artery  • 8/24 RRT > intubated for hypoxia + airway protection   • 8/25: Bronch: Some mucus plugging present on the left. • 8/26: CT chest: Complete right lower lobe and near complete left lower lobe atelectasis + edema  • 8/31: Completed Zosyn x 7 days   • 9/8: Levar Carlos placed  • 9/26: Bronch: scant secretions in right lower lobe; BAL right lower lobe       Plan:  • 35% Trach collar during day, wean FiO2 goal SpO2 >92  • ACVC 16/500/40/8 HS   • Follow up with Dr. Mp Mccracken (pulm) outpatient for f/u with home vent/trach management, vs continuation of care at [facility] given complications experienced at home after discharge yesterday  • Continue aggressive pulmonary hygiene   • Follow up with [facility]         Acute pulmonary embolism without acute cor pulmonale ([address])  Assessment & Plan  · Continue Eliquis     Neuromuscular weakness [facility])  Assessment & Plan  Patient initially presented to [facility] on 8/23 with hypoxia and encephalopathy.  Acute change in GCS prompted rapid response and subsequent intubation for airway protection. Of note, pt w/ recent hospitalization to [facility] 8/11-8/17 for encephalopathy with unclear etiology despite extensive workup but Wernicke's encephalopathy was on the differential.     •  [place] workup 8/11:  ? Rapid response and intubation on 8/12 for airway protection, extubated on 8/13  ? CT head 8/11: No acute intracranial abnormality. Stable small bilateral subcortical hypoattenuating foci. No associated mass effect. ? MRI 8/12: No acute intracranial abnormality. Stable nonspecific enhancement along the anterior floor of the third ventricle/hypothalamus compared to March 2023 study. Findings below:  ? VEEG 8/12: negative for seizure activity. Possible findings relating to underlying sleep disorder   ? LP: Grossly negative overall  ? DSPO: Treated with high dose thiamine. Psych evaluated, possible bipolar disorder. Discharged to home with his sister with visiting nurses, ST/PT/OT  • [facility] 8/23: Presented with hypoxia found by visiting nurse SPO2 in 80's  ? 8/24: RRT > intubated for airway protection   ? Transferred to [facility] on 8/24  •  [facility] 8/24-9/23  ? CT head 8/24: No acute intracranial abnormality  ? 8/25 EEG: No evidence of electrographic seizures. Mild background slowing suggestive of underlying cerebral dysfunction from toxic/metablic/infectious/hypoxic encephalopathy. Clinical correlation is recommended  ? Ach binding and blocking Ab negative   ? MUSK Ab and ACHR modulating Ab neg  ? Serum anti-TPO and DARIN Ab's, RPR, neg. CSF paraneoplastic panel neg  ? Varicella PCR neg  ? Toxoplasma antibodies negative  ? Rheum labs negative  ? 9/6 repeat LP cultures negative   ? 9/7 Completed 7 days of Ceftriaxone to cover for possible neurosyphillis  ? MRI 9/9: No acute intracranial pathology. Unremarkable MRI of the orbits. Improving enhancement of the hypothalamus.  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 ? 9/10 completed 3 days pulse dose steroids  ? 9/11 completed 5 days IVIG  • 9/15 EMG revealed no acute motor neuron disease      Plan:  • F/u outpatient with neurology in 4 weeks ; no clear diagnosis at this time  • PT/OT while admitted  • Would recommend discharge to an acute care facility with daily PT/OT capabilities to optimize patient's recovery       Impaired physical mobility  Assessment & Plan  · Required ceiling lift for mobility while hospitalized, as well as aggressive PT/OT for his deconditioned status  · Was discharged home with visiting nurses and per his sister, has not yet received/been set up for PT/OT per her knowledge. Unfortunately his mobilization chair has not yet been delivered to his home, and he has remained in bed since discharge. · There is concern for insufficient resources at his home, and he is at high risk for skin breakdown given his inability to offload pressure.  Would recommend interim discharge to an acute care rehab where he has access to more aggressive PT/OT and lift equipment until he is able to regain some strength and mobility  · PT/OT consulted  · Case Management for assistance with discharge    Multifocal lung consolidation [facility])  Assessment & Plan  · Stable/increased lung consolidations in lower lobes and RML seen on CT imaging 9/24  · 9/26 Bronched with scant secretions     Plan:  · Mucomyst/albuterol nebs q8h for mucokinesis  · Chest PT, VAP precautions  · Trach suctioning PRN  · Continue nightly ACVC for atelectasis, wean to TC daily  · Trend temperature curve, WBC        Status post tracheostomy ([address])  Assessment & Plan  · Trach care        S/P percutaneous endoscopic gastrostomy (PEG) tube placement [facility])  Assessment & Plan  · See plan as outlined above      History of LVH (left ventricular hypertrophy)  Assessment & Plan  PTA Med: Torsemide 20 mg daily  • Remains off torsemide          Depression  Assessment & Plan  · Fluoxetine 10 mg daily      Seminoma of left testis [facility])  Assessment & Plan  Testicular cancer 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 • Began Chemo in March 2023: Etoposide and Cisplatin with plan for 4 cycles, 5 days every 21 days  • Did not complete chemo treatment due to adverse effects  ? After 3rd cycle reported double vision, labile affect and right sided weakness and therefore chemotherapy was stopped on 5/26/23  • Outpatient follow up with Heme/Onc       Umbilical hernia without obstruction and without gangrene  Assessment & Plan  · Easily reducible            ICU Core Measures     Vented Patient  VAP Bundle  VAP bundle ordered     A: Assess, Prevent, and Manage Pain · Has pain been assessed? Yes  · Need for changes to pain regimen? No   B: Both Spontaneous Awakening Trials (SATs) and Spontaneous Breathing Trials (SBTs) · Plan to perform spontaneous awakening trial today? N/A   · Plan to perform spontaneous breathing trial today? N/A   · Obvious barriers to extubation? Yes   C: Choice of Sedation · RASS Goal: 0 Alert and Calm or N/A patient not on sedation  · Need for changes to sedation or analgesia regimen? NA   D: Delirium · CAM-ICU: Negative   E: Early Mobility  · Plan for early mobility? Yes   F: Family Engagement · Plan for family engagement today? Yes       Review of Invasive Devices:      Prophylaxis:  VTE VTE covered by:  apixaban, Per PEG Tube, 5 mg at 09/26/23 1727       Stress Ulcer  not ordered       Subjective   HPI/24hr events: Patient underwent Bronchoscopy which revealed clear RUL, RML, and scant secretions in RLL that were suctioned and a BAL was performed. Left lung fields were clear. Patient required ventilatory support for most of the day secondary to procedural sedation. He remained on vent settings overnight with no acute events. On-going discharge discussions. Review of Systems   Neurological: Positive for weakness. All other systems reviewed and are negative. Objective                            Vitals I/O      Most Recent Min/Max in 24hrs   Temp (!) 97.2 °F (36.2 °C) Temp  Min: 97.2 °F (36.2 °C)  Max: 98.9 °F (37.2 °C)   Pulse 77 Pulse  Min: 60  Max: 94   Resp 20 Resp  Min: 16  Max: 39   /68 BP  Min: 85/52  Max: 114/74   O2 Sat 98 % SpO2  Min: 91 %  Max: 100 %      Intake/Output Summary (Last 24 hours) at 9/26/2023 2121  Last data filed at 9/26/2023 1805  Gross per 24 hour   Intake 1255.42 ml   Output 635 ml   Net 620.42 ml         Diet Enteral/Parenteral; Tube Feeding No Oral Diet; Jevity 1.5; Continuous; 70; 200; Water; Every 4 hours     Invasive Monitoring Physical exam    Physical Exam  Eyes:      Pupils: Pupils are equal, round, and reactive to light. Skin:     General: Skin is warm and dry. HENT:      Head: Normocephalic. Mouth/Throat:      Mouth: Mucous membranes are moist.   Neck:     Trachea: Tracheostomy present. Cardiovascular:      Rate and Rhythm: Normal rate and regular rhythm. Pulses: Normal pulses. Abdominal:      General: There is abdominal type feeding tube. Palpations: Abdomen is soft. Tenderness: There is no abdominal tenderness. Constitutional:       General: He is not in acute distress. Appearance: He is ill-appearing (chronic). Pulmonary:      Effort: No respiratory distress. Breath sounds: No wheezing, rhonchi or rales. Psychiatric:         Mood and Affect: Affect is flat. Neurological:      Sensory: Sensation is intact. Motor: Weakness. Diagnostic Studies      EKG: NSR  Imaging:  I have personally reviewed pertinent reports.        Medications:  Scheduled PRN   acetylcysteine, 3 mL, Q8H  albuterol, 2.5 mg, Q8H  apixaban, 5 mg, BID  chlorhexidine, 15 mL, Q12H ISAEL  cholecalciferol, 1,000 Units, Daily  FLUoxetine, 10 mg, Daily  thiamine, 100 mg, Daily      acetaminophen, 650 mg, Q6H PRN  polyethylene glycol, 17 g, Daily PRN  senna, 17.6 mg, Daily PRN       Continuous          Labs:    CBC    Recent Labs     09/26/23  0543   WBC 9.45   HGB 10.1*   HCT 31.9*        BMP    Recent Labs     09/26/23  0543   SODIUM 137   K 3.7      CO2 30   AGAP 7   BUN 12   CREATININE 0.71   CALCIUM 8.6       Coags    No recent results     Additional Electrolytes  Recent Labs     09/26/23  0543   MG 2.1   PHOS 4.0          Blood Gas    No recent results  Recent Labs     09/26/23  0543   PHVEN 7.526*   RWX4JSI 36.8*   PO2VEN 72.2*   VBB7KHV 29.8   BEVEN 6.7    LFTs  No recent results    Infectious  No recent results  Glucose  Recent Labs     09/26/23  0543   GLUC 89                 AMBER Fernandez

## 2023-09-28 ENCOUNTER — HOME CARE VISIT (OUTPATIENT)
Dept: HOME HEALTH SERVICES | Facility: HOME HEALTHCARE | Age: 38
End: 2023-09-28
Payer: COMMERCIAL

## 2023-09-28 VITALS
BODY MASS INDEX: 34.85 KG/M2 | TEMPERATURE: 97.9 F | DIASTOLIC BLOOD PRESSURE: 72 MMHG | OXYGEN SATURATION: 96 % | HEIGHT: 76 IN | WEIGHT: 286.16 LBS | HEART RATE: 73 BPM | SYSTOLIC BLOOD PRESSURE: 123 MMHG | RESPIRATION RATE: 42 BRPM

## 2023-09-28 LAB
BACTERIA BRONCH AEROBE CULT: ABNORMAL
GRAM STN SPEC: ABNORMAL
GRAM STN SPEC: ABNORMAL

## 2023-09-28 PROCEDURE — 94760 N-INVAS EAR/PLS OXIMETRY 1: CPT

## 2023-09-28 PROCEDURE — NC001 PR NO CHARGE: Performed by: STUDENT IN AN ORGANIZED HEALTH CARE EDUCATION/TRAINING PROGRAM

## 2023-09-28 PROCEDURE — 94640 AIRWAY INHALATION TREATMENT: CPT

## 2023-09-28 PROCEDURE — 94669 MECHANICAL CHEST WALL OSCILL: CPT

## 2023-09-28 PROCEDURE — 94668 MNPJ CHEST WALL SBSQ: CPT

## 2023-09-28 PROCEDURE — 99291 CRITICAL CARE FIRST HOUR: CPT | Performed by: STUDENT IN AN ORGANIZED HEALTH CARE EDUCATION/TRAINING PROGRAM

## 2023-09-28 RX ORDER — ALBUTEROL SULFATE 2.5 MG/3ML
2.5 SOLUTION RESPIRATORY (INHALATION)
Refills: 0
Start: 2023-09-29

## 2023-09-28 RX ORDER — ACETYLCYSTEINE 200 MG/ML
3 SOLUTION ORAL; RESPIRATORY (INHALATION)
Refills: 0
Start: 2023-09-29

## 2023-09-28 RX ADMIN — Medication 1000 UNITS: at 08:00

## 2023-09-28 RX ADMIN — APIXABAN 5 MG: 5 TABLET, FILM COATED ORAL at 08:00

## 2023-09-28 RX ADMIN — THIAMINE HCL TAB 100 MG 100 MG: 100 TAB at 08:00

## 2023-09-28 RX ADMIN — ACETYLCYSTEINE 600 MG: 200 SOLUTION ORAL; RESPIRATORY (INHALATION) at 15:11

## 2023-09-28 RX ADMIN — CHLORHEXIDINE GLUCONATE 15 ML: 1.2 RINSE ORAL at 08:00

## 2023-09-28 RX ADMIN — ALBUTEROL SULFATE 2.5 MG: 2.5 SOLUTION RESPIRATORY (INHALATION) at 07:21

## 2023-09-28 RX ADMIN — FLUOXETINE 10 MG: 10 CAPSULE ORAL at 08:00

## 2023-09-28 RX ADMIN — APIXABAN 5 MG: 5 TABLET, FILM COATED ORAL at 17:44

## 2023-09-28 RX ADMIN — ACETYLCYSTEINE 600 MG: 200 SOLUTION ORAL; RESPIRATORY (INHALATION) at 07:21

## 2023-09-28 RX ADMIN — ALBUTEROL SULFATE 2.5 MG: 2.5 SOLUTION RESPIRATORY (INHALATION) at 15:11

## 2023-09-28 NOTE — ASSESSMENT & PLAN NOTE
· Brought to ED 9/24 for concern of PEG dislodgement and leaking  · CT abdomen completed: The PEG tube is dislodged and no longer within the stomach. It terminates within the abdominal wall and the balloon still appears inflated. There is some haziness within the subcutaneous fat and muscular layer surrounding the site of the tube.   · 9/25 fluro guided 18F peg tube placement by IR  · Currently infusing Jevity 1.5 at 70 MLS per hour +200 mL every 4 hours for free water flushes -tolerating with minimal residuals

## 2023-09-28 NOTE — ASSESSMENT & PLAN NOTE
Patient initially presented to [facility] on 8/23 with hypoxia and encephalopathy. Acute change in GCS prompted rapid response and subsequent intubation for airway protection. Of note, pt w/ recent hospitalization to [facility] 8/11-8/17 for encephalopathy with unclear etiology despite extensive workup but Wernicke's encephalopathy was on the differential.     •  [place] workup 8/11:  ? Rapid response and intubation on 8/12 for airway protection, extubated on 8/13  ? CT head 8/11: No acute intracranial abnormality. Stable small bilateral subcortical hypoattenuating foci. No associated mass effect. ? MRI 8/12: No acute intracranial abnormality. Stable nonspecific enhancement along the anterior floor of the third ventricle/hypothalamus compared to March 2023 study. Findings below:  ? VEEG 8/12: negative for seizure activity. Possible findings relating to underlying sleep disorder   ? LP: Grossly negative overall  ? DSPO: Treated with high dose thiamine. Psych evaluated, possible bipolar disorder. Discharged to home with his sister with visiting nurses, ST/PT/OT  • [facility] 8/23: Presented with hypoxia found by visiting nurse SPO2 in 80's  ? 8/24: RRT > intubated for airway protection   ? Transferred to [facility] on 8/24  • [facility] 8/24-9/[facility]  ? CT head 8/24: No acute intracranial abnormality  ? 8/25 EEG: No evidence of electrographic seizures. Mild background slowing suggestive of underlying cerebral dysfunction from toxic/metablic/infectious/hypoxic encephalopathy. Clinical correlation is recommended  ? Ach binding and blocking Ab negative   ? MUSK Ab and ACHR modulating Ab neg  ? Serum anti-TPO and DARIN Ab's, RPR, neg. CSF paraneoplastic panel neg  ? Varicella PCR neg  ? Toxoplasma antibodies negative  ? Rheum labs negative  ? 9/6 repeat LP cultures negative   ? 9/7 Completed 7 days of Ceftriaxone to cover for possible neurosyphillis  ? MRI 9/9: No acute intracranial pathology.  Unremarkable MRI of the orbits. Improving enhancement of the hypothalamus. 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 ? 9/10 completed 3 days pulse dose steroids  ?  9/11 completed 5 days IVIG  • 9/15 EMG revealed no acute motor neuron disease      Plan:  • F/u outpatient with neurology in 4 weeks ; no clear diagnosis at this time  • PT/OT while admitted  • Would recommend discharge to an acute care facility with daily PT/OT capabilities to optimize patient's recovery

## 2023-09-28 NOTE — ASSESSMENT & PLAN NOTE
Patient initially presented to [facility] on 8/23 with hypoxia and encephalopathy. Acute change in GCS prompted rapid response and subsequent intubation for airway protection. Of note, pt w/ recent hospitalization to [facility] 8/11-8/17 for encephalopathy with unclear etiology despite extensive workup but Wernicke's encephalopathy was on the differential.     •  [place] workup 8/11:  ? Rapid response and intubation on 8/12 for airway protection, extubated on 8/13  ? CT head 8/11: No acute intracranial abnormality. Stable small bilateral subcortical hypoattenuating foci. No associated mass effect. ? MRI 8/12: No acute intracranial abnormality. Stable nonspecific enhancement along the anterior floor of the third ventricle/hypothalamus compared to March 2023 study. Findings below:  ? VEEG 8/12: negative for seizure activity. Possible findings relating to underlying sleep disorder   ? LP: Grossly negative overall  ? DSPO: Treated with high dose thiamine. Psych evaluated, possible bipolar disorder. Discharged to home with his sister with visiting nurses, ST/PT/OT  •  [place] ([place] 8/23: Presented with hypoxia found by visiting nurse SPO2 in 80's  ? 8/24: RRT > intubated for airway protection   ? Transferred to [facility] on 8/24  •  [facility] 8/24-9/23  ? CT head 8/24: No acute intracranial abnormality  ? 8/25 EEG: No evidence of electrographic seizures. Mild background slowing suggestive of underlying cerebral dysfunction from toxic/metablic/infectious/hypoxic encephalopathy. Clinical correlation is recommended  ? Ach binding and blocking Ab negative   ? MUSK Ab and ACHR modulating Ab neg  ? Serum anti-TPO and DARIN Ab's, RPR, neg. CSF paraneoplastic panel neg  ? Varicella PCR neg  ? Toxoplasma antibodies negative  ? Rheum labs negative  ? 9/6 repeat LP cultures negative   ? 9/7 Completed 7 days of Ceftriaxone to cover for possible neurosyphillis  ? MRI 9/9: No acute intracranial pathology.  Unremarkable MRI of the orbits. Improving enhancement of the hypothalamus. 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 ? 9/10 completed 3 days pulse dose steroids  ?  9/11 completed 5 days IVIG  • 9/15 EMG revealed no acute motor neuron disease   • F/u outpatient with neurology in 4 weeks ; no clear diagnosis at this time  • PT/OT while admitted  • Would recommend discharge to an acute care facility with daily PT/OT capabilities to optimize patient's recovery however family is adamantly refusing, and will be discharged home on 9/28

## 2023-09-28 NOTE — Clinical Note
We received the referral for [employer]. I had numerous communications with Jean Pierre Rushing from [address] [place] [address] related to what we can provide and the importance of having the correct supplies ( correct size inner cannulas and tube feed ) ordered and delivered before our visit. I spoke with the sister Nahun Wiggins to discuss her expectations and goals and what we can provide for them. She is aware we will not be managing the vent. She said [employer] will be taking care of the vent and they have a CP child in the home and are familiar with all of the complex care. I explained our skill will be to teach the caregivers  to change the inner cannula, clean around the stoma and suction. Also to educate on the PEG tube and feedings. I explained we would be short term, the insurance will not approve visits for maintenance. She understood and was in agreement. I told her when the nurse came to visit she would assess for appropriate support before we would agree to provide service. She said that in addition to her, she has three other family members who will be learning. Jared Mckeon has been versed in all of the communication that has occurred today with Preeti. I have reinforced with Alonzo Zarate to be sure all of the supplies are there before she makes the visit.

## 2023-09-28 NOTE — RESPIRATORY THERAPY NOTE
Extra supplies ready to go home with patient including one size down trach plus an extra inner cannula in a separate bag from the other supplies that also include the correct trach plus three additional inner cannulas.

## 2023-09-28 NOTE — PROGRESS NOTES
[id number] [facility]  Progress Note  Name: Belinda Chavez  MRN: [id number]  Unit/Bed#: ICU 08 I Date of Admission: 9/24/2023   Date of Service: 9/28/2023 I Hospital Day: 2    Assessment/Plan   * Migration of percutaneous endoscopic gastrostomy (PEG) tube [facility])  Assessment & Plan  · Brought to ED 9/24 for concern of PEG dislodgement and leaking  · CT abdomen completed: The PEG tube is dislodged and no longer within the stomach. It terminates within the abdominal wall and the balloon still appears inflated. There is some haziness within the subcutaneous fat and muscular layer surrounding the site of the tube. · 9/25 fluro guided 18F peg tube placement by IR    Chronic respiratory failure with hypoxia and hypercapnia [facility])  Assessment & Plan  Recently admitted for acute respiratory failure with notable following events:  • 8/12-8/13: intubated for airway protection when acutely had GCS of 5 at [facility]  • 8/23 CTA PE study: PE in the right lower lobe segmental pulmonary artery  • 8/24 RRT > intubated for hypoxia + airway protection   • 8/25: Bronch: Some mucus plugging present on the left. • 8/26: CT chest: Complete right lower lobe and near complete left lower lobe atelectasis + edema  • 8/31: Completed Zosyn x 7 days   • 9/8: Margueritte Fagan placed  • 9/26: Bronch: scant secretions in right lower lobe; BAL right lower lobe       Plan:  • 35% Trach collar during day, wean FiO2 goal SpO2 >92  • ACVC 16/500/40/8 HS   • Follow up with Dr. Alvin Barraza (pulm) outpatient for f/u with home vent/trach management, vs continuation of care at [facility] given complications experienced at home after discharge yesterday  • Continue aggressive pulmonary hygiene   • Follow up with [facility]         Acute pulmonary embolism without acute cor pulmonale ([address])  Assessment & Plan  · Continue Eliquis     Neuromuscular weakness [facility])  Assessment & Plan  Patient initially presented to [facility] on [facility] with hypoxia and encephalopathy.  Acute change in GCS prompted rapid response and subsequent intubation for airway protection. Of note, pt w/ recent hospitalization to [facility] 8/11-8/17 for encephalopathy with unclear etiology despite extensive workup but Wernicke's encephalopathy was on the differential.     •  [facility] [place] workup 8/11:  ? Rapid response and intubation on 8/12 for airway protection, extubated on 8/13  ? CT head 8/11: No acute intracranial abnormality. Stable small bilateral subcortical hypoattenuating foci. No associated mass effect. ? MRI 8/12: No acute intracranial abnormality. Stable nonspecific enhancement along the anterior floor of the third ventricle/hypothalamus compared to March 2023 study. Findings below:  ? VEEG 8/12: negative for seizure activity. Possible findings relating to underlying sleep disorder   ? LP: Grossly negative overall  ? DSPO: Treated with high dose thiamine. Psych evaluated, possible bipolar disorder. Discharged to home with his sister with visiting nurses, ST/PT/OT  • [facility] [place] [facility]: Presented with hypoxia found by visiting nurse SPO2 in 80's  ? 8/24: RRT > intubated for airway protection   ? Transferred to [facility] on 8/24  •  [facility] 8/24-9/23  ? CT head 8/24: No acute intracranial abnormality  ? 8/25 EEG: No evidence of electrographic seizures. Mild background slowing suggestive of underlying cerebral dysfunction from toxic/metablic/infectious/hypoxic encephalopathy. Clinical correlation is recommended  ? Ach binding and blocking Ab negative   ? MUSK Ab and ACHR modulating Ab neg  ? Serum anti-TPO and DARIN Ab's, RPR, neg. CSF paraneoplastic panel neg  ? Varicella PCR neg  ? Toxoplasma antibodies negative  ? Rheum labs negative  ? 9/6 repeat LP cultures negative   ? 9/7 Completed 7 days of Ceftriaxone to cover for possible neurosyphillis  ? MRI 9/9: No acute intracranial pathology. Unremarkable MRI of the orbits. Improving enhancement of the hypothalamus.  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 ? 9/10 completed 3 days pulse dose steroids  ? 9/11 completed 5 days IVIG  • 9/15 EMG revealed no acute motor neuron disease      Plan:  • F/u outpatient with neurology in 4 weeks ; no clear diagnosis at this time  • PT/OT while admitted  • Would recommend discharge to an acute care facility with daily PT/OT capabilities to optimize patient's recovery       Impaired physical mobility  Assessment & Plan  · Required ceiling lift for mobility while hospitalized, as well as aggressive PT/OT for his deconditioned status  · Was discharged home with visiting nurses and per his sister, has not yet received/been set up for PT/OT per her knowledge. Unfortunately his mobilization chair has not yet been delivered to his home, and he has remained in bed since discharge. · There is concern for insufficient resources at his home, and he is at high risk for skin breakdown given his inability to offload pressure.  Would recommend interim discharge to an acute care rehab where he has access to more aggressive PT/OT and lift equipment until he is able to regain some strength and mobility  · PT/OT consulted  · Case Management for assistance with discharge    Multifocal lung consolidation [facility])  Assessment & Plan  · Stable/increased lung consolidations in lower lobes and RML seen on CT imaging 9/24  · 9/26 Bronched with scant secretions     Plan:  · Mucomyst/albuterol nebs q8h for mucokinesis  · Chest PT, VAP precautions  · Trach suctioning PRN  · Continue nightly ACVC for atelectasis, wean to TC daily  · Trend temperature curve, WBC        Status post tracheostomy ([address])  Assessment & Plan  · Trach care        S/P percutaneous endoscopic gastrostomy (PEG) tube placement [facility])  Assessment & Plan  · See plan as outlined above      History of LVH (left ventricular hypertrophy)  Assessment & Plan  PTA Med: Torsemide 20 mg daily  • Remains off torsemide          Depression  Assessment & Plan  · Fluoxetine 10 mg daily      Seminoma of left testis [facility])  Assessment & Plan  Testicular cancer 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 • Began Chemo in March 2023: Etoposide and Cisplatin with plan for 4 cycles, 5 days every 21 days  • Did not complete chemo treatment due to adverse effects  ? After 3rd cycle reported double vision, labile affect and right sided weakness and therefore chemotherapy was stopped on 5/26/23  • Outpatient follow up with Heme/Onc       Umbilical hernia without obstruction and without gangrene  Assessment & Plan  · Easily reducible              ICU Core Measures     Vented Patient  VAP Bundle  VAP bundle ordered     A: Assess, Prevent, and Manage Pain · Has pain been assessed? Yes  · Need for changes to pain regimen? No   B: Both Spontaneous Awakening Trials (SATs) and Spontaneous Breathing Trials (SBTs) · Plan to perform spontaneous awakening trial today? N/A   · Plan to perform spontaneous breathing trial today? N/A   · Obvious barriers to extubation? NA   C: Choice of Sedation · RASS Goal: 0 Alert and Calm  · Need for changes to sedation or analgesia regimen? No   D: Delirium · CAM-ICU: Negative   E: Early Mobility  · Plan for early mobility? Yes   F: Family Engagement · Plan for family engagement today? Yes       Review of Invasive Devices:      Central access plan:       Prophylaxis:  VTE VTE covered by:  apixaban, Per PEG Tube, 5 mg at 09/27/23 1729       Stress Ulcer  not ordered       Subjective   HPI/24hr events: no events  Review of Systems   All other systems reviewed and are negative.          Objective                            Vitals I/O      Most Recent Min/Max in 24hrs   Temp 98.3 °F (36.8 °C) Temp  Min: 96 °F (35.6 °C)  Max: 98.3 °F (36.8 °C)   Pulse 64 Pulse  Min: 64  Max: 104   Resp 16 Resp  Min: 16  Max: 32   /65 BP  Min: 100/60  Max: 124/83   O2 Sat 98 % SpO2  Min: 94 %  Max: 100 %      Intake/Output Summary (Last 24 hours) at 9/28/2023 0550  Last data filed at 9/27/2023 1801  Gross per 24 hour   Intake 2930 ml   Output 50 ml   Net 2880 ml         Diet Enteral/Parenteral; Tube Feeding No Oral Diet; Jevity 1.5; Continuous; 70; 200; Water; Every 4 hours     Invasive Monitoring Physical exam    Physical Exam  Eyes:      Pupils: Pupils are equal, round, and reactive to light. HENT:      Head: Normocephalic. Mouth/Throat:      Mouth: Mucous membranes are moist.   Cardiovascular:      Rate and Rhythm: Normal rate. Abdominal:      Palpations: Abdomen is soft. Constitutional:       General: He is not in acute distress. Appearance: He is well-developed. Pulmonary:      Effort: Pulmonary effort is normal. No respiratory distress. Neurological:      General: No focal deficit present. Mental Status: He is alert and oriented to person, place and time. Vitals and nursing note reviewed. Diagnostic Studies      EKG:   Imaging:  I have personally reviewed pertinent reports.        Medications:  Scheduled PRN   acetylcysteine, 3 mL, Q8H  albuterol, 2.5 mg, Q8H  apixaban, 5 mg, BID  chlorhexidine, 15 mL, Q12H ISAEL  cholecalciferol, 1,000 Units, Daily  FLUoxetine, 10 mg, Daily  thiamine, 100 mg, Daily      acetaminophen, 650 mg, Q6H PRN  polyethylene glycol, 17 g, Daily PRN  senna, 17.6 mg, Daily PRN       Continuous          Labs:    CBC    No recent results  BMP    No recent results    Coags    No recent results     Additional Electrolytes  No recent results       Blood Gas    No recent results  Recent Labs     09/27/23  0508   PHVEN 7.389   ELI9NGV 49.7   PO2VEN 72.6*   BYX6WLQ 29.3   BEVEN 3.6    LFTs  No recent results    Infectious  No recent results  Glucose  No recent results                Neena Zamarripa PA-C

## 2023-09-28 NOTE — ASSESSMENT & PLAN NOTE
Recently admitted for acute respiratory failure with notable following events:  • 8/12-8/13: intubated for airway protection when acutely had GCS of 5 at [facility]  • 8/23 CTA PE study: PE in the right lower lobe segmental pulmonary artery  • 8/24 RRT > intubated for hypoxia + airway protection   • 8/25: Bronch: Some mucus plugging present on the left.   • 8/26: CT chest: Complete right lower lobe and near complete left lower lobe atelectasis + edema  • 8/31: Completed Zosyn x 7 days   • 9/8: Mercedes Whiting placed  • Discharged home 9/23  • Admitted 9/24 for leaking PEG tube  • 9/26: Bronch: scant secretions in right lower lobe; BAL right lower lobe   • 35% Trach collar during day, wean FiO2 goal SpO2 >92  • ACVC 16/500/40/8 HS   • Follow up with Dr. Krystyna Ortiz (pulm) outpatient for f/u with home vent/trach management  • Continue aggressive pulmonary hygiene

## 2023-09-28 NOTE — DISCHARGE SUMMARY
[address]  Discharge- Dionna Reid 1985, 41 y.o. male MRN: [id number]  Unit/Bed#: ICU 08 Encounter: [id number]  Primary Care Provider: Kunal Stokes MD   Date and time admitted to hospital: 9/24/2023  4:26 PM    * Migration of percutaneous endoscopic gastrostomy (PEG) tube [facility])  Assessment & Plan  · Brought to ED 9/24 for concern of PEG dislodgement and leaking  · CT abdomen completed: The PEG tube is dislodged and no longer within the stomach. It terminates within the abdominal wall and the balloon still appears inflated. There is some haziness within the subcutaneous fat and muscular layer surrounding the site of the tube. · 9/25 fluro guided 18F peg tube placement by IR  · Currently infusing Jevity 1.5 at 70 MLS per hour +200 mL every 4 hours for free water flushes -tolerating with minimal residuals    Chronic respiratory failure with hypoxia and hypercapnia (HCC)  Assessment & Plan  Recently admitted for acute respiratory failure with notable following events:  • 8/12-8/13: intubated for airway protection when acutely had GCS of 5 at [facility]  • 8/23 CTA PE study: PE in the right lower lobe segmental pulmonary artery  • 8/24 RRT > intubated for hypoxia + airway protection   • 8/25: Bronch: Some mucus plugging present on the left.   • 8/26: CT chest: Complete right lower lobe and near complete left lower lobe atelectasis + edema  • 8/31: Completed Zosyn x 7 days   • 9/8: Cyrilla [place] placed  • Discharged home 9/23  • Admitted 9/24 for leaking PEG tube  • 9/26: Bronch: scant secretions in right lower lobe; BAL right lower lobe   • 35% Trach collar during day, wean FiO2 goal SpO2 >92  • ACVC 16/500/40/8 HS   • Follow up with Dr. Rogerio Steward (pulm) outpatient for f/u with home vent/trach management  • Continue aggressive pulmonary hygiene       Acute pulmonary embolism without acute cor pulmonale ([address])  Assessment & Plan  · Continue Eliquis     Neuromuscular weakness (HCC)  Assessment & Plan  Patient initially presented to [facility] on 8/23 with hypoxia and encephalopathy. Acute change in GCS prompted rapid response and subsequent intubation for airway protection. Of note, pt w/ recent hospitalization to [facility] 8/11-8/17 for encephalopathy with unclear etiology despite extensive workup but Wernicke's encephalopathy was on the differential.     •  [place] workup 8/11:  ? Rapid response and intubation on 8/12 for airway protection, extubated on 8/13  ? CT head 8/11: No acute intracranial abnormality. Stable small bilateral subcortical hypoattenuating foci. No associated mass effect. ? MRI 8/12: No acute intracranial abnormality. Stable nonspecific enhancement along the anterior floor of the third ventricle/hypothalamus compared to March 2023 study. Findings below:  ? VEEG 8/12: negative for seizure activity. Possible findings relating to underlying sleep disorder   ? LP: Grossly negative overall  ? DSPO: Treated with high dose thiamine. Psych evaluated, possible bipolar disorder. Discharged to home with his sister with visiting nurses, ST/PT/OT  •  [facility] 8/23: Presented with hypoxia found by visiting nurse SPO2 in 80's  ? 8/24: RRT > intubated for airway protection   ? Transferred to [facility] on 8/24  •  Jann[facility] Mcnulty 8/24-9/23  ? CT head 8/24: No acute intracranial abnormality  ? 8/25 EEG: No evidence of electrographic seizures. Mild background slowing suggestive of underlying cerebral dysfunction from toxic/metablic/infectious/hypoxic encephalopathy. Clinical correlation is recommended  ? Ach binding and blocking Ab negative   ? MUSK Ab and ACHR modulating Ab neg  ? Serum anti-TPO and DARIN Ab's, RPR, neg. CSF paraneoplastic panel neg  ? Varicella PCR neg  ? Toxoplasma antibodies negative  ? Rheum labs negative  ? 9/6 repeat LP cultures negative   ? 9/7 Completed 7 days of Ceftriaxone to cover for possible neurosyphillis  ? MRI 9/9: No acute intracranial pathology.  Unremarkable MRI of the orbits. Improving enhancement of the hypothalamus. 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 ? 9/10 completed 3 days pulse dose steroids  ? 9/11 completed 5 days IVIG  • 9/15 EMG revealed no acute motor neuron disease   • F/u outpatient with neurology in 4 weeks ; no clear diagnosis at this time  • PT/OT while admitted  • Would recommend discharge to an acute care facility with daily PT/OT capabilities to optimize patient's recovery however family is adamantly refusing, and will be discharged home on 9/28      Impaired physical mobility  Assessment & Plan  · Required ceiling lift for mobility while hospitalized, as well as aggressive PT/OT for his deconditioned status  · Was discharged home with visiting nurses and per his sister, has not yet received/been set up for PT/OT per her knowledge. Unfortunately his mobilization chair has not yet been delivered to his home, and he has remained in bed since discharge. · There is concern for insufficient resources at his home, and he is at high risk for skin breakdown given his inability to offload pressure.  Would recommend interim discharge to an acute care rehab where he has access to more aggressive PT/OT and lift equipment until he is able to regain some strength and mobility  · Long-term care rehab was recommended for patient however family is adamantly refusing -to be discharged home on 9/28    Multifocal lung consolidation [facility])  Assessment & Plan  · Stable/increased lung consolidations in lower lobes and RML seen on CT imaging 9/24  · 9/26 Bronched with scant secretions   · Mucomyst/albuterol nebs q8h for mucokinesis  · Trach suctioning PRN  · Continue nightly ACVC for atelectasis, wean to TC daily  · Stable for discharge on 9/28 however medical team is recommending long-term acute rehab, family is insisting on taking him home        Status post tracheostomy [facility])  Assessment & Plan  · Trach care        S/P percutaneous endoscopic gastrostomy (PEG) tube placement [facility])  Assessment & Plan  · See plan as outlined above      History of LVH (left ventricular hypertrophy)  Assessment & Plan  PTA Med: Torsemide 20 mg daily  • Remains off torsemide          Depression  Assessment & Plan  · Fluoxetine 10 mg daily      Seminoma of left testis [facility])  Assessment & Plan  Testicular cancer 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 • Began Chemo in March 2023: Etoposide and Cisplatin with plan for 4 cycles, 5 days every 21 days  • Did not complete chemo treatment due to adverse effects  ? After 3rd cycle reported double vision, labile affect and right sided weakness and therefore chemotherapy was stopped on 5/26/23  • Outpatient follow up with Heme/Onc       Umbilical hernia without obstruction and without gangrene  Assessment & Plan  · Easily reducible                 Medical Problems     Resolved Problems  Date Reviewed: 9/28/2023   None         Admission Date:   Admission Orders (From admission, onward)     Ordered        09/26/23 1408  Inpatient Admission  Once            09/24/23 1747  Place in Observation  Once                        Admitting Diagnosis: PEG tube malfunction ([address]) [K94.23]  Feeding tube dysfunction [T85.598A]    HPI: Patient is a 79-year-old male with a past medical history of seminoma of the left testes status post left orchiectomy and chemo, hypertension, left ventricular hypertrophy, depression, and obesity. Originally the patient presented to [address] [place] [address] on 8/23 from home after his visiting nurse found his pulse ox to be in the 80s. On admission, CTA PE demonstrated right lower lobe segmental PE without evidence of RV strain. He was started on heparin infusion and was admitted to the floor. Unfortunately during that admission, the patient was a rapid response for hypoxia and altered mental status.   He was intubated for airway protection and continued to have episodes of desaturation. At that time he was transferred to [facility] for higher level of care. During his stay at that time, he continued to have poor neuromuscular function with low NIF and was unable to wean from the ventilator. He received IVIG treatments, antibiotics, and steroids. On 9/8, the patient received a trach/PEG will remain vent dependent. He was transition to trach collar on 9/15 and discharged home on 9/23 despite recommendation to go to long-term care facility. Unfortunately, the patient returned to the hospital on 9/24 for concern for leaking around his PEG tube. Procedures Performed: No orders of the defined types were placed in this encounter. Summary of Hospital Course: Patient was admitted back to the ICU on 9/24 and had a CT abdomen that confirmed malposition of the PEG tube which terminated in the abdominal wall and needed to be repositioned. Patient was taken to IR on 9/25 and had a PEG tube replacement. He remained on trach collar during the day and ventilator at bedtime with AC/VC settings. On 9/26, the patient was bronched with no secretions and a BAL was sent. On 9/27, it was deemed that the patient was medically stable for discharge and the recommendation by the critical care team was to go to a long-term acute care rehab. Unfortunately, the patient's mother and sister were against this and refused to allow him to be discharged anywhere other than home. An attempt was made to have the patient's mother and sister come in on 9/28 for a family meeting, however they refused to stating that there was nothing more to be discussed and demanded that the patient be discharged home to them later this evening.     Again, it is recommended by the critical care team that although he is medically stable for discharge from the hospital, it was recommended that he be admitted to a long-term care rehab to help wean his ventilator at night, monitor his PEG tube, and continue to improve his strength. Patient to be picked up on 9/28 2023 at 1900. Significant Findings, Care, Treatment and Services Provided: 9/26 bronch with BAL    Complications: vent dependent HS, tube feeds via PEG     Condition at Discharge: fair         Discharge instructions/Information to patient and family:   See after visit summary for information provided to patient and family. Provisions for Follow-Up Care:  See after visit summary for information related to follow-up care and any pertinent home health orders. PCP: Sanna Whitaker MD    Disposition: Home    Planned Readmission: No    Discharge Statement   I spent 30 minutes discharging the patient. This time was spent on the day of discharge. I had direct contact with the patient on the day of discharge. Additional documentation is required if more than 30 minutes were spent on discharge. Discharge Medications:  See after visit summary for reconciled discharge medications provided to patient and family.

## 2023-09-28 NOTE — NURSING NOTE
Patient's sister Bolivar Beasley notified of  time for discharge to home. Discharge instructions reviewed as well. Extra supplies sent include respiratory supplies as well as 4 piston syringes for medication administration.

## 2023-09-28 NOTE — DISCHARGE INSTR - AVS FIRST PAGE
CONTINUE VENTILATOR AT NIGHT      SETTINGS   ACVC 16/500/40/8    Trach collar 28% during the day       Tube feeds   Jevity 1.2 @ 70 ml/hr   Water flushes 200 ml every 4 hours

## 2023-09-28 NOTE — ASSESSMENT & PLAN NOTE
Recently admitted for acute respiratory failure with notable following events:  • 8/12-8/13: intubated for airway protection when acutely had GCS of 5 at [facility]  • 8/23 CTA PE study: PE in the right lower lobe segmental pulmonary artery  • 8/24 RRT > intubated for hypoxia + airway protection   • 8/25: Bronch: Some mucus plugging present on the left.   • 8/26: CT chest: Complete right lower lobe and near complete left lower lobe atelectasis + edema  • 8/31: Completed Zosyn x 7 days   • 9/8: Linzie [place] placed  • 9/26: Bronch: scant secretions in right lower lobe; BAL right lower lobe       Plan:  • 35% Trach collar during day, wean FiO2 goal SpO2 >92  • ACVC 16/500/40/8 HS   • Follow up with Dr. Galicia (pulm) outpatient for f/u with home vent/trach management, vs [facility] of [facility] given complications experienced at home after discharge yesterday  • Continue aggressive pulmonary hygiene   • Follow up with AZUCENA [facility]

## 2023-09-28 NOTE — ASSESSMENT & PLAN NOTE
· Required ceiling lift for mobility while hospitalized, as well as aggressive PT/OT for his deconditioned status  · Was discharged home with visiting nurses and per his sister, has not yet received/been set up for PT/OT per her knowledge. Unfortunately his mobilization chair has not yet been delivered to his home, and he has remained in bed since discharge. · There is concern for insufficient resources at his home, and he is at high risk for skin breakdown given his inability to offload pressure.  Would recommend interim discharge to an acute care rehab where he has access to more aggressive PT/OT and lift equipment until he is able to regain some strength and mobility  · Long-term care rehab was recommended for patient however family is adamantly refusing -to be discharged home on 9/28

## 2023-09-28 NOTE — CASE MANAGEMENT
Case Management Discharge Planning Note    Patient name Thania Conley  Location ICU 08/ICU 08 MRN [id number]  : 1985 Date 2023       Current Admission Date: 2023  Current Admission Diagnosis:Migration of percutaneous endoscopic gastrostomy (PEG) tube [facility])   Patient Active Problem List    Diagnosis Date Noted   • Migration of percutaneous endoscopic gastrostomy (PEG) tube ([address]) 2023   • Impaired physical mobility 2023   • Multifocal lung consolidation ([address]) 2023   • Motor neuron disease ([address])    • Status post tracheostomy ([address]) 2023   • S/P percutaneous endoscopic gastrostomy (PEG) tube placement ([address]) 2023   • Anxiety 2023   • Chronic respiratory failure with hypoxia and hypercapnia ([address]) 2023   • Acute pulmonary embolism without acute cor pulmonale ([address]) 2023   • History of Wernicke's encephalopathy 2023   • Depression 2023   • Neuromuscular weakness ([address]) 2023   • History of LVH (left ventricular hypertrophy) 2023   • Pure hypertriglyceridemia 2023   • Encephalopathy 2023   • Unilateral vestibular schwannoma ([address]) 2023   • Port-A-Cath in place 2023   • Diplopia 2023   • 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   • Urinary hesitancy    • Umbilical hernia without obstruction and without gangrene 12/10/2022   • Tobacco use 12/10/2022   • Retroperitoneal lymphadenopathy 12/10/2022      LOS (days): 2  Geometric Mean LOS (GMLOS) (days):   Days to GMLOS:     OBJECTIVE:  Risk of Unplanned Readmission Score: 33.36         Current admission status: Inpatient   [facility]   [id number], [address]  Phone: [phone] Fax: [phone]    Primary Care Provider: Janett Atkinson MD    Primary Insurance: [facility]  Secondary Insurance:     DISCHARGE DETAILS:    Discharge planning discussed with[de-identified] Patient's mother Pricila Bales, sister Zandra Luque, Resp Manager Winsome Garza, ICU attending physician Dr. Oscar Anne of Choice: Yes  Comments - Freedom of Choice: home-DEE DEE with [facility] contacted family/caregiver?: Yes  Were Treatment Team discharge recommendations reviewed with patient/caregiver?: Yes  Did patient/caregiver verbalize understanding of patient care needs?: Yes  Were patient/caregiver advised of the risks associated with not following Treatment Team discharge recommendations?: Yes    Contacts  Patient Contacts:  Matt Bynum (sister)  Relationship to Patient[de-identified] Family  Contact Method: Phone  Phone Number: [phone]  Reason/Outcome: Discharge Planning, Emergency Contact, Continuity of [address]         Is the patient interested in [address] [place] [address] at discharge?: Yes  [facility] requested[de-identified] Nursing, Medical Social Work, Physical Therapy, Occupational [address] [facility] Name[de-identified] [place] Provider[de-identified] PCP  Home Health Services Needed[de-identified] Evaluate Functional Status and Safety, Gait/ADL Training, Wound/Ostomy Care, Strengthening/Theraputic Exercises to Improve Function, Other (comment) (Peg/trach care)  Oxygen LPM Ordered (if applicable based on home health services needed)::  (8 LPM)  Homebound Criteria Met[de-identified] Requires the Assistance of Another Person for Safe Ambulation or to Leave the Home, Uses an Assist Device (i.e. cane, walker, etc), Requires Medical Transportation  Supporting Clincal Findings[de-identified] Requires Oxygen, Bed Bound or Wheelchair Bound    DME Referral Provided  Referral made for DME?: No    Other Referral/Resources/Interventions Provided:  Interventions: Transportation, [address] [place] [address]     Treatment Team Recommendation: LTACH  Discharge Destination Plan[de-identified] Home  Transport at Discharge : BLS Ambulance  Dispatcher Contacted: Yes  Number/Name of Dispatcher: [employer]  [facility]  ETA of Transport (Date): 09/28/23  ETA of Transport (Time): 1700

## 2023-09-28 NOTE — UTILIZATION REVIEW
Continued Stay Review    Date: 9/28                          Current Patient Class: Inpatient   Current Level of Care: Med/surg    HPI:38 y.o. male initially admitted on 9/26     Assessment/Plan: nodding his head. Denies n/v/d/constipation. Bronchoscopy BAL with gram-positive cocci in pairs chains and clusters, still pending . Triy BIpap tonight at 40%. Adjust inspiratory pressure to help tidal volumes. PT/oT recommends post acute rehab. Plan for family meeting to discuss discharge plan. [place] setting would be a facility for ventilator weaning given his complex needs. Family does not wish for that. Continue with PEG tube feedings.      Vital Signs:   e/Time Temp Pulse Resp BP MAP (mmHg) SpO2 FiO2 (%) O2 Flow Rate (L/min) O2 Device Patient Position - Orthostatic VS   09/28/23 1100 -- 76 24 Abnormal  -- -- 98 % -- -- -- --   09/28/23 1000 -- 77 36 Abnormal  102/62 79 98 % -- -- -- --   09/28/23 0900 -- 79 22 -- -- 96 % -- -- -- --   09/28/23 0802 -- -- -- -- -- 94 % 40 8 L/min Trach mask --   09/28/23 0800 -- 90 24 Abnormal  112/61 80 94 % -- -- -- --   09/28/23 0757 97.9 °F (36.6 °C) -- -- -- -- -- -- -- -- --   09/28/23 0721 -- 65 16 -- -- 98 % -- -- -- --   09/28/23 0600 -- 89 20 108/70 84 98 % -- -- -- --   09/28/23 0400 97.7 °F (36.5 °C) 64 16 105/65 80 98 % -- -- -- Lying   09/28/23 0322 -- -- -- -- -- 99 % 40 -- Ventilator --   09/28/23 0200 -- 67 16 100/60 73 97 % -- -- -- --   09/28/23 0000 98.3 °F (36.8 °C) 96 19 106/59 77 100 % 40 -- Ventilator      Pertinent Labs/Diagnostic Results:       Results from last 7 days   Lab Units 09/26/23  0543 09/24/23  1824   WBC Thousand/uL 9.45 8.63   HEMOGLOBIN g/dL 10.1* 11.6*   HEMATOCRIT % 31.9* 37.4   PLATELETS Thousands/uL 231 247   NEUTROS ABS Thousands/µL  --  6.08         Results from last 7 days   Lab Units 09/26/23  0543 09/24/23  1824   SODIUM mmol/L 137 139   POTASSIUM mmol/L 3.7 4.4   CHLORIDE mmol/L 100 99   CO2 mmol/L 30 32   ANION GAP mmol/L 7 8   BUN mg/dL 12 15   CREATININE mg/dL 0.71 0.77   EGFR ml/min/1.73sq m 120 116   CALCIUM mg/dL 8.6 9.1   MAGNESIUM mg/dL 2.1 2.3   PHOSPHORUS mg/dL 4.0  --      Results from last 7 days   Lab Units 09/24/23  1824   AST U/L 34   ALT U/L 79*   ALK PHOS U/L 74   TOTAL PROTEIN g/dL 7.3   ALBUMIN g/dL 3.3*   TOTAL BILIRUBIN mg/dL 0.71     Results from last 7 days   Lab Units 09/25/23  1558 09/25/23  1342 09/25/23  0927   POC GLUCOSE mg/dl 92 153* 98     Results from last 7 days   Lab Units 09/26/23  0543 09/24/23  1824   GLUCOSE RANDOM mg/dL 89 87       Results from last 7 days   Lab Units 09/23/23  0733 09/22/23  0441   PH ART  7.534* 7.427   PCO2 ART mm Hg 39.3 55.1*   PO2 ART mm Hg 74.0* 77.7   HCO3 ART mmol/L 32.4* 35.5*   BASE EXC ART mmol/L 9.1 9.4   O2 CONTENT ART mL/dL 15.4* 16.7   O2 HGB, ARTERIAL % 94.6 94.0   ABG SOURCE  Radial, Right Radial, Right     Results from last 7 days   Lab Units 09/27/23  0508 09/26/23  0543   PH SYLVESTER  7.389 7.526*   PCO2 SYLVESTER mm Hg 49.7 36.8*   PO2 SYLVESTER mm Hg 72.6* 72.2*   HCO3 SYLVESTER mmol/L 29.3 29.8   BASE EXC SYLVESTER mmol/L 3.6 6.7   O2 CONTENT SYLVESTER ml/dL 15.2 14.6   O2 HGB, VENOUS % 91.3* 93.8*       Results from last 7 days   Lab Units 09/24/23  1824   LACTIC ACID mmol/L 1.1     Results from last 7 days   Lab Units 09/26/23  1105   GRAM STAIN RESULT  1+ Polys*  1+ Gram positive cocci in pairs, chains and clusters*         Medications:   Scheduled Medications:  acetylcysteine, 3 mL, Nebulization, Q8H  albuterol, 2.5 mg, Nebulization, Q8H  apixaban, 5 mg, Per PEG Tube, BID  chlorhexidine, 15 mL, Mouth/Throat, Q12H [address]  cholecalciferol, 1,000 Units, Per G Tube, Daily  FLUoxetine, 10 mg, Per G Tube, Daily  thiamine, 100 mg, Per G Tube, Daily      Continuous IV Infusions:     PRN Meds:  acetaminophen, 650 mg, Per G Tube, Q6H PRN  polyethylene glycol, 17 g, Per PEG Tube, Daily PRN  senna, 17.6 mg, Per PEG Tube, Daily PRN        Discharge Plan: TBD    Network Utilization Review Department  ATTENTION: Please call with any questions or concerns to [phone] and carefully listen to the prompts so that you are directed to the right person. All voicemails are confidential.  Anne Pleitez all requests for admission clinical reviews, approved or denied determinations and any other requests to dedicated fax number below belonging to the campus where the patient is receiving treatment.  List of dedicated fax numbers for the Facilities:  [place] DENIALS (Administrative/Medical Necessity) [phone] 230[address] [facility] (Maternity/NICU/Pediatrics) [phone]   [address] [place] [address] [phone]   [facility] 1000 [facility] [phone]   [address] [place] [address] 207 [facility] [address] [phone]   [address] [facility] [address] [phone]

## 2023-09-28 NOTE — ASSESSMENT & PLAN NOTE
· Stable/increased lung consolidations in lower lobes and RML seen on CT imaging 9/24  · 9/26 Bronched with scant secretions   · Mucomyst/albuterol nebs q8h for mucokinesis  · Trach suctioning PRN  · Continue nightly ACVC for atelectasis, wean to TC daily  · Stable for discharge on 9/28 however medical team is recommending long-term acute rehab, family is insisting on taking him home

## 2023-09-28 NOTE — CASE MANAGEMENT
Case Management Discharge Planning Note    Patient name Jeramie Jimmy  Location ICU 08/ICU 08 MRN [id number]  : 1985 Date 2023       Current Admission Date: 2023  Current Admission Diagnosis:Migration of percutaneous endoscopic gastrostomy (PEG) tube [facility])   Patient Active Problem List    Diagnosis Date Noted   • Migration of percutaneous endoscopic gastrostomy (PEG) tube ([address]) 2023   • Impaired physical mobility 2023   • Multifocal lung consolidation ([address]) 2023   • Motor neuron disease ([address])    • Status post tracheostomy ([address]) 2023   • S/P percutaneous endoscopic gastrostomy (PEG) tube placement ([address]) 2023   • Anxiety 2023   • Chronic respiratory failure with hypoxia and hypercapnia ([address]) 2023   • Acute pulmonary embolism without acute cor pulmonale ([address]) 2023   • History of Wernicke's encephalopathy 2023   • Depression 2023   • Neuromuscular weakness ([address]) 2023   • History of LVH (left ventricular hypertrophy) 2023   • Pure hypertriglyceridemia 2023   • Encephalopathy 2023   • Unilateral vestibular schwannoma ([address]) 2023   • Port-A-Cath in place 2023   • Diplopia 2023   • 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   • Urinary hesitancy    • Umbilical hernia without obstruction and without gangrene 12/10/2022   • Tobacco use 12/10/2022   • Retroperitoneal lymphadenopathy 12/10/2022      LOS (days): 2  Geometric Mean LOS (GMLOS) (days):   Days to GMLOS:     OBJECTIVE:  Risk of Unplanned Readmission Score: 33.99         Current admission status: Inpatient   [facility], 10 42 [facility] [address]  Phone: [phone] Fax: [phone]    Primary Care Provider: Shannon Friend MD    Primary Insurance: [facility]  Secondary Insurance:     DISCHARGE DETAILS:    Other Referral/Resources/Interventions Provided:  Referral Comments: CM called the  Arleth Villeda at [facility] at [phone] this morning and left voicemail introducing self, role and purpose of phone call and requested a call back regarding patient care and community resources that patient may be eligible for.

## 2023-09-29 ENCOUNTER — HOME CARE VISIT (OUTPATIENT)
Dept: HOME HEALTH SERVICES | Facility: HOME HEALTHCARE | Age: 38
End: 2023-09-29
Payer: COMMERCIAL

## 2023-09-29 ENCOUNTER — PATIENT OUTREACH (OUTPATIENT)
Dept: CASE MANAGEMENT | Facility: OTHER | Age: 38
End: 2023-09-29

## 2023-09-29 ENCOUNTER — TELEPHONE (OUTPATIENT)
Dept: CASE MANAGEMENT | Facility: HOSPITAL | Age: 38
End: 2023-09-29

## 2023-09-29 VITALS
RESPIRATION RATE: 22 BRPM | DIASTOLIC BLOOD PRESSURE: 82 MMHG | TEMPERATURE: 97.8 F | SYSTOLIC BLOOD PRESSURE: 118 MMHG | OXYGEN SATURATION: 93 % | HEART RATE: 64 BPM

## 2023-09-29 PROCEDURE — G0299 HHS/HOSPICE OF RN EA 15 MIN: HCPCS

## 2023-09-29 NOTE — TELEPHONE ENCOUNTER
CM received call back this morning from [facility], [facility] Pak [phone]. RISHABH discussed case with her and informed that patient was discharged to home yesterday and this CM wanted to follow up and find out his eligibility for additional services in the community. Moon informed RISHABH that she has been following this patient and had spoke with sister Jumana Naqvi regarding his assessment and qualifying services, but due to his frequent hospitalizations, the assessment has not been possible, but he does qualify for 22 hours per week of personal assistance in home. Moon then mentioned that patient's insurance is changing as of Oct 1 to [facility], and therefore she will not be able to follow him but will reach out to [facility] and Home and [facility] based Service Program to follow up and he may possibly be eligible for more than 22 hrs per week and much more. CM faxed Discharge Summary to [place] at [phone] as requested. CM reached out to [address] [place] [address] via [address] to inform about change in patient's insurance to [facility] as of Oct 1.

## 2023-09-29 NOTE — NURSING NOTE
Patient discharged home via stretcher with transport team. All belongings and extra supplies sent with patient.  Discharge paperwork handed to transport team.

## 2023-09-29 NOTE — PROGRESS NOTES
ADT alert received with patient discharge. Patient also has a referral from HRR report. Patient was admitted to [address] on 9/24 for PEG tube migration, chronic respiratory failure with hypoxia and hypercapnia status post tracheostomy ,impaired physical mobility, neuromuscular weakness and acute pulmonary embolism. Patient was discharged home with [facility]. Patient had start of care visit on 9/28 at 10:00 but notes are incomplete. I called Christopher Johnson his caregiver and she reports patient is doing well. She manages his tube feedings and places patient on a ventilator at night. Any change in vent setting are managed by . The home health nurse reconciled medications with Christopher Johnson. Christopher Johnson reports she is missing some of the medications. I went over the list with her and all escripts were sent to [facility] in [place] ([place]). Christopher Johnson will call [facility].  Karine understands the pneumonia zone tool. Neurology will call and schedule a follow up appointment per AVS.  He has no chest pain or shortness of breath Dmitry stated. Patient is homebound and is transported by ambulance when he has appointments. Christopher Johnson reports she has all of the trach and tube feed supplies needed. She has my contact information. I advised her to call me if I can assist her. She did consent to another call.

## 2023-09-29 NOTE — UTILIZATION REVIEW
NOTIFICATION OF ADMISSION DISCHARGE   This is a Notification of Discharge from [address] [place] [address]. Please be advised that this patient has been discharge from our facility. Below you will find the admission and discharge date and time including the patient’s disposition. UTILIZATION REVIEW CONTACT:  Beth Alexander  Utilization   Network Utilization Review Department  Phone: [phone] x carefully listen to the prompts. All voicemails are confidential.  Email: [email or web address]     ADMISSION INFORMATION  PRESENTATION DATE: 8/24/2023  6:25 PM  OBERVATION ADMISSION DATE:   INPATIENT ADMISSION DATE: 8/24/23  6:25 PM   DISCHARGE DATE: 9/23/2023 10:30 AM   DISPOSITION:Home with Home Health Care    IMPORTANT INFORMATION:  Send all requests for admission clinical reviews, approved or denied determinations and any other requests to dedicated fax number below belonging to the campus where the patient is receiving treatment.  List of dedicated fax numbers:  [place] DENIALS (Administrative/Medical Necessity) [phone] 2303 [facility] (Maternity/NICU/Pediatrics) [phone]   [facility] [phone]   [facility] [phone] 1636 [facility] [phone]   [address] [place] [address] [phone]   [facility] [phone]   270 [facility] 608 [facility] [phone]   506 [facility] [phone]   3441 [facility] [phone]   [address] [facility] [address] [facility] [phone]

## 2023-10-01 ENCOUNTER — HOME CARE VISIT (OUTPATIENT)
Dept: HOME HEALTH SERVICES | Facility: HOME HEALTHCARE | Age: 38
End: 2023-10-01
Payer: COMMERCIAL

## 2023-10-01 PROCEDURE — 400013 VN SOC

## 2023-10-01 PROCEDURE — G0299 HHS/HOSPICE OF RN EA 15 MIN: HCPCS

## 2023-10-02 ENCOUNTER — TELEPHONE (OUTPATIENT)
Dept: FAMILY MEDICINE CLINIC | Facility: CLINIC | Age: 38
End: 2023-10-02

## 2023-10-02 ENCOUNTER — TELEPHONE (OUTPATIENT)
Age: 38
End: 2023-10-02

## 2023-10-02 ENCOUNTER — TELEPHONE (OUTPATIENT)
Dept: PULMONOLOGY | Facility: CLINIC | Age: 38
End: 2023-10-02

## 2023-10-02 ENCOUNTER — TELEPHONE (OUTPATIENT)
Dept: NEUROSURGERY | Facility: CLINIC | Age: 38
End: 2023-10-02

## 2023-10-02 ENCOUNTER — PATIENT OUTREACH (OUTPATIENT)
Dept: CASE MANAGEMENT | Facility: OTHER | Age: 38
End: 2023-10-02

## 2023-10-02 DIAGNOSIS — Z74.8 ASSISTANCE NEEDED WITH TRANSPORTATION: Primary | ICD-10-CM

## 2023-10-02 NOTE — PROGRESS NOTES
I received a call from [facility] patient;s caregiver reporting she needs a large wheelchair for Hemanth and a large bedside commode. I advised her I will call Hemanth's PCP since we need an order. [facility] also states she was taking Hemanth to appointments but can no longer manage that since he is not able to walk. She is asking for assistance setting up transportation. Patient has a trach and will need oxygen avaiable during transport. advised [facility] I will place a referral for [facility].

## 2023-10-02 NOTE — PROGRESS NOTES
I spoke with Ewa Moya at PCP office and asked for orders for a large wheelchair and large bedside commode. She will message PCP with requested DME.

## 2023-10-02 NOTE — TELEPHONE ENCOUNTER
Ok, please document the TCM call and we can order the wheelchair and bedside commode at the appointment, I have not seen him recently. He sees Hospice, if he has seen them recently based on their eval they can get the order done prior to the appointment.

## 2023-10-02 NOTE — TELEPHONE ENCOUNTER
Called pt to scheduled a follow up apt from the hospital and went straight to voicemail , left a detailed message for a call back.  Discharged on the 9/28

## 2023-10-02 NOTE — TELEPHONE ENCOUNTER
Janell Rodriguez from [address] [place] [address] () called to say the patient was recently discharged from the hospital and is in need for an order for a large wheel chair and bedside commode. He is also in need of a TCM appt. Please advise.

## 2023-10-03 ENCOUNTER — PATIENT OUTREACH (OUTPATIENT)
Dept: FAMILY MEDICINE CLINIC | Facility: CLINIC | Age: 38
End: 2023-10-03

## 2023-10-03 ENCOUNTER — TELEPHONE (OUTPATIENT)
Dept: NEUROLOGY | Facility: CLINIC | Age: 38
End: 2023-10-03

## 2023-10-03 ENCOUNTER — HOME CARE VISIT (OUTPATIENT)
Dept: HOME HEALTH SERVICES | Facility: HOME HEALTHCARE | Age: 38
End: 2023-10-03
Payer: COMMERCIAL

## 2023-10-03 ENCOUNTER — PATIENT OUTREACH (OUTPATIENT)
Dept: CASE MANAGEMENT | Facility: OTHER | Age: 38
End: 2023-10-03

## 2023-10-03 VITALS — SYSTOLIC BLOOD PRESSURE: 124 MMHG | DIASTOLIC BLOOD PRESSURE: 86 MMHG | OXYGEN SATURATION: 90 % | HEART RATE: 81 BPM

## 2023-10-03 PROCEDURE — G0151 HHCP-SERV OF PT,EA 15 MIN: HCPCS

## 2023-10-03 NOTE — PROGRESS NOTES
In basket message sent to Miesha Pacheco clarifying that patient has [facility] home health not hospice. I requested an order for a large wheelchair and large bedside commode.

## 2023-10-03 NOTE — PROGRESS NOTES
RNCM discussed patient with SW this morning in huddle. Patient is a trach and peg. Patient is not ambulatory at this time. Patient needs assistance to medical appointments. Chart review completed. Phone call placed to patient's sister and caregiver, Gayle [place]. Sister reports that they are in need of medical transport until patient is strong enough to get up and walk with them again. Patient does have waiver services.  is [facility]. Sister is not sure of name and phone number as there is a new SC. Sister will call back to provide this information. SW will connect with SC to determine if medical transport can be arranged through waiver. SW awaiting return call from sister.

## 2023-10-03 NOTE — TELEPHONE ENCOUNTER
Patient sisterLeisa  called to schedule from referral      Patient sister was not listed on the Communication consent form    Informed the sister that we would need to speak to either the patient or another family member listed on the consent form     Patient sister will have the spouse or parent of the patient call us back

## 2023-10-04 ENCOUNTER — TELEPHONE (OUTPATIENT)
Age: 38
End: 2023-10-04

## 2023-10-04 VITALS
WEIGHT: 286 LBS | SYSTOLIC BLOOD PRESSURE: 128 MMHG | TEMPERATURE: 98.7 F | OXYGEN SATURATION: 97 % | HEART RATE: 78 BPM | DIASTOLIC BLOOD PRESSURE: 70 MMHG | HEIGHT: 76 IN | BODY MASS INDEX: 34.83 KG/M2 | RESPIRATION RATE: 18 BRPM

## 2023-10-04 LAB
DME PARACHUTE DELIVERY DATE ACTUAL: NORMAL
DME PARACHUTE DELIVERY DATE EXPECTED: NORMAL
DME PARACHUTE DELIVERY DATE REQUESTED: NORMAL
DME PARACHUTE ITEM DESCRIPTION: NORMAL
DME PARACHUTE ORDER STATUS: NORMAL
DME PARACHUTE SUPPLIER NAME: NORMAL
DME PARACHUTE SUPPLIER PHONE: NORMAL

## 2023-10-04 NOTE — TELEPHONE ENCOUNTER
I cannot do this, he had an appointment today, I do not know his risk of aspiration and reason for PEG. Strongly encourage patient's family to schedule and keep the follow up for any recommendations or testings form me.

## 2023-10-04 NOTE — PROGRESS NOTES
Noted , below. Patient needs to be in a skilled nursing facility or LTAC as he is not getting the necessary follow up and is complex for care management. My office tried to call him multiple times for virtual follow up this morning and was not able to contact. Around 10:45 we got a call back asking for virtual visit scheduled for 10 am. In order to accommodate and provide care, we tried to do the virtual visit around noon, left a voicemail since they did not answer again, this will need to be rescheduled. How can we proceed about this? Also , what is the status of his hospice care? What recommendations do they have?

## 2023-10-04 NOTE — CASE COMMUNICATION
[place] has admitted your patient to [facility] service with the following disciplines:      SN, PT and OT  This report is informational only, no responses is needed  Primary focus of home health care- GI  Patient stated goals of care- Manage tube feed and trach care at home. Anticipated visit pattern and next visit date- SN visit 10/5 2w1 1w1  See medication list - meds in home differ from AVS  Significant clinical findings-  all medications at home  Potential barriers to goal achievement- Dependent on ADL, trach, and g tube care. Thank you for allowing us to participate in the care of your patient.       [facility] Colon RN

## 2023-10-04 NOTE — TELEPHONE ENCOUNTER
Pt's called to say that pt wants to eat and drink. She was told that someone could come to the house and do a swallow test. She is hoping this can be set up. Please, call sister back. Pt does not feel like he is getting enough to eat through the tube and is hungry.

## 2023-10-05 ENCOUNTER — PATIENT OUTREACH (OUTPATIENT)
Dept: FAMILY MEDICINE CLINIC | Facility: CLINIC | Age: 38
End: 2023-10-05

## 2023-10-05 ENCOUNTER — TELEPHONE (OUTPATIENT)
Dept: NEUROLOGY | Facility: CLINIC | Age: 38
End: 2023-10-05

## 2023-10-05 ENCOUNTER — HOME CARE VISIT (OUTPATIENT)
Dept: HOME HEALTH SERVICES | Facility: HOME HEALTHCARE | Age: 38
End: 2023-10-05
Payer: COMMERCIAL

## 2023-10-05 VITALS
SYSTOLIC BLOOD PRESSURE: 120 MMHG | TEMPERATURE: 97.8 F | HEART RATE: 83 BPM | DIASTOLIC BLOOD PRESSURE: 82 MMHG | OXYGEN SATURATION: 98 % | RESPIRATION RATE: 18 BRPM

## 2023-10-05 PROCEDURE — G0299 HHS/HOSPICE OF RN EA 15 MIN: HCPCS

## 2023-10-05 PROCEDURE — G0152 HHCP-SERV OF OT,EA 15 MIN: HCPCS

## 2023-10-05 NOTE — TELEPHONE ENCOUNTER
Patients sister returned call, he is scheduled for 10:40am at [facility] with  on 10/12. Thank you.

## 2023-10-05 NOTE — PROGRESS NOTES
SW has not received phone call from sister. Chart review finds patient has a , Kevin Santana at [facility]. SW placed phone call to [facility] and left message. SW received return call from [facility] who reports that patient has changed insurance and she is no longer his SC.  SC was able to provide that patient does not have transportation benefit through waiver. Patient is currently approved for 22h a week through [facility] [phone]. SC advised sister to contact new SC upon d/c from hospital to have patient re-assessed for service hours. New insurance is in system.

## 2023-10-05 NOTE — TELEPHONE ENCOUNTER
JORDAN/8/24-9/23/Neuromuscular weakness     Hemanth BRIONES Stevenherbert will need follow up in in 4 weeks with general attending. We will need to discuss scheduling with attending neurologist and her staff regarding scheduling this patient within the 4 weeks requested by the attending neurology MD.    Lanre Leach 10/26 @ 2:00 with Dr. Jostin Dasilva in [facility].

## 2023-10-05 NOTE — Clinical Note
OT referral received and evaluation administered 10/5/23. Currently, pt limited by complex medical hx, impaired functional activity tolerance, deconditioning, weakness, and instability in R knee with min instability and corrected bucklng noted. Pt to benefit from skilled OT to address functional deficits and maximize safety and I. Pt and family agreeable to OT POC 1 x week 1, 2 x a week for 4 weeks.

## 2023-10-05 NOTE — Clinical Note
OT referral received and evaluation administered 10/5/23. Currently, pt limited by complex medical hx, impaired functional activity tolerance, deconditioning, weakness, and instability in R knee with min instability and corrected bucklng noted. Pt to benefit from skilled OT to address functional deficits and maximize safety and I. Pt and family agreeable to OT POC 1 x week 1, 2 x a week for 4 weeks. Please follow up on the following family concerns:  Pt's hospital bed is not long enough for him, causing risk of joint and skin integrity protection. Pt's family with multiple concerns about size of commode, despite bariatric. Pt's sister reporting pt is unable to urinate on commode because there is not enough space for him to access bucket while seated on it. Family would like a wc and larger split leg nicky pad (as they're using their nephew's (who has CP) nciky and pad). Please evaluate and discuss if nicky is necessary in next session and follow up regarding ordering wc as pt is significantly deconditioned. Pt's sister also requesting wedge cushions. OT suggested purchasing on [facility].

## 2023-10-06 ENCOUNTER — HOME CARE VISIT (OUTPATIENT)
Dept: HOME HEALTH SERVICES | Facility: HOME HEALTHCARE | Age: 38
End: 2023-10-06
Payer: COMMERCIAL

## 2023-10-06 ENCOUNTER — PATIENT OUTREACH (OUTPATIENT)
Dept: CASE MANAGEMENT | Facility: OTHER | Age: 38
End: 2023-10-06

## 2023-10-06 VITALS — HEART RATE: 78 BPM | OXYGEN SATURATION: 99 % | SYSTOLIC BLOOD PRESSURE: 122 MMHG | DIASTOLIC BLOOD PRESSURE: 70 MMHG

## 2023-10-06 VITALS — OXYGEN SATURATION: 100 % | DIASTOLIC BLOOD PRESSURE: 94 MMHG | SYSTOLIC BLOOD PRESSURE: 126 MMHG | HEART RATE: 71 BPM

## 2023-10-06 PROCEDURE — G0151 HHCP-SERV OF PT,EA 15 MIN: HCPCS

## 2023-10-06 NOTE — PROGRESS NOTES
I spoke with Jorje Brice patient's sister and caregiver. She reports patient is able to stand and take a few steps. She will be able to get patient to his appointments if he receives a wheelchair. I advised her I notified the PCP office of Hemanth's needs. He is scheduled to see Pulmonary on 10/12. He continues to receive home health care with [id number] [facility]. Jorje Brice reports he is hungry and wants to eat. She is aware he needs a swallow test and is hoping pulmonary will order it when he is seen next week. His  will meet with patient today per Jorje Narvaez. She reports he has no other needs at this time.

## 2023-10-09 ENCOUNTER — HOME CARE VISIT (OUTPATIENT)
Dept: HOME HEALTH SERVICES | Facility: HOME HEALTHCARE | Age: 38
End: 2023-10-09
Payer: COMMERCIAL

## 2023-10-09 ENCOUNTER — TELEPHONE (OUTPATIENT)
Age: 38
End: 2023-10-09

## 2023-10-09 ENCOUNTER — PATIENT OUTREACH (OUTPATIENT)
Dept: CASE MANAGEMENT | Facility: OTHER | Age: 38
End: 2023-10-09

## 2023-10-09 VITALS — DIASTOLIC BLOOD PRESSURE: 72 MMHG | SYSTOLIC BLOOD PRESSURE: 118 MMHG | OXYGEN SATURATION: 99 % | HEART RATE: 84 BPM

## 2023-10-09 VITALS — HEART RATE: 88 BPM | OXYGEN SATURATION: 99 % | DIASTOLIC BLOOD PRESSURE: 80 MMHG | SYSTOLIC BLOOD PRESSURE: 120 MMHG

## 2023-10-09 PROCEDURE — G0151 HHCP-SERV OF PT,EA 15 MIN: HCPCS

## 2023-10-09 PROCEDURE — G0152 HHCP-SERV OF OT,EA 15 MIN: HCPCS

## 2023-10-09 NOTE — PROGRESS NOTES
I spoke with Daryle Sieving patient's sister and caregiver who reports patient is still in need of a wheelchair. A specialized wheelchair was ordered but denied by his insurance. Daryle Sieving stated he needs a bariatric wheelchair. I advised her I will call the PCP office.

## 2023-10-09 NOTE — CASE COMMUNICATION
He needs many orders and evaluations and missed his virtual as well. Advise follow up. We will place wheelchair order in the interim, but for any of these orders there is a need for physician face to face evaluation per insurance guidelines.

## 2023-10-09 NOTE — TELEPHONE ENCOUNTER
Please address the message below. Has a APT thurs for removal of trach and would like the proper chair to transport him.

## 2023-10-09 NOTE — PROGRESS NOTES
I called Dr. Andre Powell office and requested an order for a bariatric wheelchair for Hemanth. The patient needs it so his family can transport him to his appointments. He has an appointment on Thursday to see Dr. Karl Ventura and have his trach tube changed. I was advised a message will be sent to Dr. Erika Holliday.

## 2023-10-09 NOTE — PROGRESS NOTES
OT did the assessment, I did not, I am not sure if their assessment alone would suffice. Let the family /patient/  know, since I keep getting multiple requests.

## 2023-10-09 NOTE — TELEPHONE ENCOUNTER
Patient is still in need of a wheelchair. A specialized wheelchair was ordered but denied by his insurance. Patient needs a bariatric wheelchair.      Please advise

## 2023-10-11 ENCOUNTER — HOME CARE VISIT (OUTPATIENT)
Dept: HOME HEALTH SERVICES | Facility: HOME HEALTHCARE | Age: 38
End: 2023-10-11
Payer: COMMERCIAL

## 2023-10-11 ENCOUNTER — PATIENT OUTREACH (OUTPATIENT)
Dept: CASE MANAGEMENT | Facility: OTHER | Age: 38
End: 2023-10-11

## 2023-10-11 VITALS — HEART RATE: 75 BPM | DIASTOLIC BLOOD PRESSURE: 75 MMHG | OXYGEN SATURATION: 98 % | SYSTOLIC BLOOD PRESSURE: 120 MMHG

## 2023-10-11 PROCEDURE — G0299 HHS/HOSPICE OF RN EA 15 MIN: HCPCS

## 2023-10-11 PROCEDURE — G0152 HHCP-SERV OF OT,EA 15 MIN: HCPCS

## 2023-10-11 NOTE — PROGRESS NOTES
I emailed our department liaison at [facility] to ask if an order for Hemanth's wheelchair was received. She verified it was received and asked me to give patient's cargiver a number to call to schedule delivery. I called Dmitry's number and left a detailed message with 's Patient Support number ([phone]) on her voicemail.

## 2023-10-11 NOTE — PROGRESS NOTES
I reviewed the chart for an order fo a wheelchair. There is a note indicatiog the order was sent by the PCP. I will follow up with [employer]. I called Jumana Naqvi patient's sister and I advised her the wheelchair was ordered.

## 2023-10-13 ENCOUNTER — HOME CARE VISIT (OUTPATIENT)
Dept: HOME HEALTH SERVICES | Facility: HOME HEALTHCARE | Age: 38
End: 2023-10-13
Payer: COMMERCIAL

## 2023-10-13 ENCOUNTER — PATIENT OUTREACH (OUTPATIENT)
Dept: CASE MANAGEMENT | Facility: OTHER | Age: 38
End: 2023-10-13

## 2023-10-13 VITALS
SYSTOLIC BLOOD PRESSURE: 120 MMHG | OXYGEN SATURATION: 98 % | DIASTOLIC BLOOD PRESSURE: 75 MMHG | HEART RATE: 75 BPM | TEMPERATURE: 97.7 F | RESPIRATION RATE: 18 BRPM

## 2023-10-13 VITALS — SYSTOLIC BLOOD PRESSURE: 152 MMHG | DIASTOLIC BLOOD PRESSURE: 82 MMHG | OXYGEN SATURATION: 99 % | HEART RATE: 74 BPM

## 2023-10-13 PROCEDURE — G0151 HHCP-SERV OF PT,EA 15 MIN: HCPCS

## 2023-10-13 NOTE — PROGRESS NOTES
I called Meaghan Florez patient's caregiver. His wheelchair was delivered yesterday but not in time for his pulmonary appointment. It was rescheduled to 10/24. Patient is doing well and continues to have [facility] services, nursing,PT and OT. Meaghan Florez is requesting information on how to contact a wheelchair van. I will route my note to [facility].

## 2023-10-14 LAB — MISCELLANEOUS LAB TEST RESULT: NORMAL

## 2023-10-16 ENCOUNTER — HOME CARE VISIT (OUTPATIENT)
Dept: HOME HEALTH SERVICES | Facility: HOME HEALTHCARE | Age: 38
End: 2023-10-16
Payer: COMMERCIAL

## 2023-10-16 ENCOUNTER — NURSE TRIAGE (OUTPATIENT)
Dept: OTHER | Facility: OTHER | Age: 38
End: 2023-10-16

## 2023-10-16 ENCOUNTER — TELEPHONE (OUTPATIENT)
Dept: NEUROLOGY | Facility: CLINIC | Age: 38
End: 2023-10-16

## 2023-10-16 ENCOUNTER — PATIENT OUTREACH (OUTPATIENT)
Dept: FAMILY MEDICINE CLINIC | Facility: CLINIC | Age: 38
End: 2023-10-16

## 2023-10-16 NOTE — TELEPHONE ENCOUNTER
Homebound patient, discharged with tracheostomy, PEG tube on 9/28/23. Patient's sister was advised to try Melatonin OTC, to expect f/u from PCP today or tomorrow.

## 2023-10-16 NOTE — TELEPHONE ENCOUNTER
Reason for Disposition  • Insomnia persists > 1 week and following Insomnia Care Advice    Answer Assessment - Initial Assessment Questions  1. DESCRIPTION: "Tell me about your sleeping problem."       Patient has been not sleeping at night   2. ONSET: "How long have you been having trouble sleeping?" (e.g., days, weeks, months)       A year ago. Patient hasn't been sleeping at night since he was discharged from the hospital 3 weeks ago. 3. RECURRENT: "Have you had sleeping problems before?"  If Yes, ask: "What happened that time?" "What helped your sleeping problem go away in the past?"      Recurrent   4. STRESS: "Is there anything in your life that is making you feel stressed or tense?"      Cancer patient, stage 2.    5. PAIN: "Do you have any pain that is keeping you awake?" (e.g., back pain, headache, abdominal pain)      No   8. OTHER SYMPTOMS: "Do you have any other symptoms?"  (e.g., difficulty breathing)      No    Protocols used:  Insomnia-ADULT-[place]

## 2023-10-16 NOTE — PROGRESS NOTES
SAHILCM placed follow up phone call to sister/caregiver Leisa. [place] reports that they have received the required wheelchair and now need transportation to medical appointments. Patient is able to stand and pivot with assistance. SW discussed Ambuc[employer]. SW also advised that sister would have to call insurance to determine if patient would have coverage for the medical transport to and from medical appointments. Sister asked if [facility] was an option. SW agreed to place referral and applications. Alex application submitted through [employer], [place] [id number], [place] [id number], and [place] Application [place] [id number]. Sister denied any further needs at this time.

## 2023-10-17 ENCOUNTER — HOME CARE VISIT (OUTPATIENT)
Dept: HOME HEALTH SERVICES | Facility: HOME HEALTHCARE | Age: 38
End: 2023-10-17
Payer: COMMERCIAL

## 2023-10-17 VITALS — DIASTOLIC BLOOD PRESSURE: 80 MMHG | SYSTOLIC BLOOD PRESSURE: 125 MMHG | HEART RATE: 82 BPM | OXYGEN SATURATION: 99 %

## 2023-10-17 VITALS
RESPIRATION RATE: 18 BRPM | HEART RATE: 76 BPM | OXYGEN SATURATION: 98 % | DIASTOLIC BLOOD PRESSURE: 84 MMHG | TEMPERATURE: 97.8 F | SYSTOLIC BLOOD PRESSURE: 122 MMHG

## 2023-10-17 PROCEDURE — G0299 HHS/HOSPICE OF RN EA 15 MIN: HCPCS

## 2023-10-17 PROCEDURE — G0152 HHCP-SERV OF OT,EA 15 MIN: HCPCS

## 2023-10-18 ENCOUNTER — HOME CARE VISIT (OUTPATIENT)
Dept: HOME HEALTH SERVICES | Facility: HOME HEALTHCARE | Age: 38
End: 2023-10-18
Payer: COMMERCIAL

## 2023-10-18 ENCOUNTER — TELEPHONE (OUTPATIENT)
Age: 38
End: 2023-10-18

## 2023-10-18 ENCOUNTER — TELEPHONE (OUTPATIENT)
Dept: FAMILY MEDICINE CLINIC | Facility: CLINIC | Age: 38
End: 2023-10-18

## 2023-10-18 ENCOUNTER — OFFICE VISIT (OUTPATIENT)
Dept: FAMILY MEDICINE CLINIC | Facility: CLINIC | Age: 38
End: 2023-10-18
Payer: COMMERCIAL

## 2023-10-18 VITALS — OXYGEN SATURATION: 99 % | SYSTOLIC BLOOD PRESSURE: 144 MMHG | HEART RATE: 82 BPM | DIASTOLIC BLOOD PRESSURE: 82 MMHG

## 2023-10-18 VITALS
RESPIRATION RATE: 20 BRPM | OXYGEN SATURATION: 97 % | HEIGHT: 76 IN | BODY MASS INDEX: 34.81 KG/M2 | DIASTOLIC BLOOD PRESSURE: 64 MMHG | SYSTOLIC BLOOD PRESSURE: 126 MMHG | TEMPERATURE: 98.1 F | HEART RATE: 80 BPM

## 2023-10-18 DIAGNOSIS — G70.9 NEUROMUSCULAR WEAKNESS (HCC): ICD-10-CM

## 2023-10-18 DIAGNOSIS — F31.9 BIPOLAR DISORDER WITH DEPRESSION (HCC): ICD-10-CM

## 2023-10-18 DIAGNOSIS — E55.9 VITAMIN D DEFICIENCY: ICD-10-CM

## 2023-10-18 DIAGNOSIS — Z71.89 COORDINATION OF COMPLEX CARE: ICD-10-CM

## 2023-10-18 DIAGNOSIS — F51.04 PSYCHOPHYSIOLOGICAL INSOMNIA: ICD-10-CM

## 2023-10-18 DIAGNOSIS — Z74.09 IMPAIRED PHYSICAL MOBILITY: ICD-10-CM

## 2023-10-18 DIAGNOSIS — J96.11 CHRONIC RESPIRATORY FAILURE WITH HYPOXIA AND HYPERCAPNIA (HCC): ICD-10-CM

## 2023-10-18 DIAGNOSIS — Z13.810 ENCOUNTER FOR SWALLOWING STUDY: ICD-10-CM

## 2023-10-18 DIAGNOSIS — C62.92 SEMINOMA OF LEFT TESTIS (HCC): Chronic | ICD-10-CM

## 2023-10-18 DIAGNOSIS — G93.40 ACUTE ENCEPHALOPATHY: ICD-10-CM

## 2023-10-18 DIAGNOSIS — D33.3 UNILATERAL VESTIBULAR SCHWANNOMA (HCC): Primary | ICD-10-CM

## 2023-10-18 DIAGNOSIS — J18.1 MULTIFOCAL LUNG CONSOLIDATION (HCC): ICD-10-CM

## 2023-10-18 DIAGNOSIS — E78.1 PURE HYPERTRIGLYCERIDEMIA: Chronic | ICD-10-CM

## 2023-10-18 DIAGNOSIS — I26.90 ACUTE SEPTIC PULMONARY EMBOLISM WITHOUT ACUTE COR PULMONALE (HCC): ICD-10-CM

## 2023-10-18 DIAGNOSIS — Z95.828 PORT-A-CATH IN PLACE: ICD-10-CM

## 2023-10-18 DIAGNOSIS — Z23 ENCOUNTER FOR IMMUNIZATION: ICD-10-CM

## 2023-10-18 DIAGNOSIS — G62.89 MIXED AXONAL-DEMYELINATING POLYNEUROPATHY: ICD-10-CM

## 2023-10-18 DIAGNOSIS — Z93.0 STATUS POST TRACHEOSTOMY (HCC): ICD-10-CM

## 2023-10-18 DIAGNOSIS — J96.12 CHRONIC RESPIRATORY FAILURE WITH HYPOXIA AND HYPERCAPNIA (HCC): ICD-10-CM

## 2023-10-18 DIAGNOSIS — F33.2 SEVERE EPISODE OF RECURRENT MAJOR DEPRESSIVE DISORDER, WITHOUT PSYCHOTIC FEATURES (HCC): ICD-10-CM

## 2023-10-18 DIAGNOSIS — Z93.1 S/P PERCUTANEOUS ENDOSCOPIC GASTROSTOMY (PEG) TUBE PLACEMENT (HCC): ICD-10-CM

## 2023-10-18 DIAGNOSIS — I26.99 PULMONARY EMBOLISM ON RIGHT (HCC): ICD-10-CM

## 2023-10-18 PROBLEM — K94.23: Status: RESOLVED | Noted: 2023-09-24 | Resolved: 2023-10-18

## 2023-10-18 PROCEDURE — G0153 HHCP-SVS OF S/L PATH,EA 15MN: HCPCS

## 2023-10-18 PROCEDURE — G0151 HHCP-SERV OF PT,EA 15 MIN: HCPCS

## 2023-10-18 PROCEDURE — 99215 OFFICE O/P EST HI 40 MIN: CPT | Performed by: FAMILY MEDICINE

## 2023-10-18 RX ORDER — FLUOXETINE 10 MG/1
10 CAPSULE ORAL DAILY
Qty: 30 CAPSULE | Refills: 2 | Status: SHIPPED | OUTPATIENT
Start: 2023-10-18

## 2023-10-18 RX ORDER — MELATONIN
1000 DAILY
Qty: 30 TABLET | Refills: 0 | Status: SHIPPED | OUTPATIENT
Start: 2023-10-18

## 2023-10-18 RX ORDER — PEN NEEDLE, DIABETIC 32GX 5/32"
1 NEEDLE, DISPOSABLE MISCELLANEOUS 4 TIMES DAILY
Qty: 48 EACH | Refills: 5 | Status: SHIPPED | OUTPATIENT
Start: 2023-10-18

## 2023-10-18 RX ORDER — LANOLIN ALCOHOL/MO/W.PET/CERES
100 CREAM (GRAM) TOPICAL DAILY
Qty: 30 TABLET | Refills: 2 | Status: SHIPPED | OUTPATIENT
Start: 2023-10-18

## 2023-10-18 NOTE — TELEPHONE ENCOUNTER
Left message for patient's sister as he was suppose to be here in the office for an appointment and had not shown up.

## 2023-10-18 NOTE — PROGRESS NOTES
Subjective:      Patient ID: Thania Conley is a 41 y.o. male. Based on hospital discharge summary-38 y.o. 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 Later was hypoxic and found to have altered mental status during the hospital course. Required emergent intubation, suspicion for aspiration pneumonia and sepsis. Over period of preceding months he was minimally verbal and family was reporting that he had been that way for several months. He was discharged to [address] [place] [address] in the beginning of August.  During multiple hospital stays he had an extensive neurologic workup including CT head which was unremarkable, MRI showing enhancement of the anterior floor of the third ventricle, hypothalamus, mamillary bodies which was stable from previous. LP negative for meningitis/encephalitis. Autoimmune panel and paraneoplastic CSFstudies were negative. EEG negative for seizures. 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 She said that he often said things that did not make sense. He required assistance for most ADLs. She said he had become incontinent and was requiring briefs.  He was minimally ambulatory prior to arrival            Past Medical History:   Diagnosis Date    Anxiety     Cancer [facility])     Depression     Migration of percutaneous endoscopic gastrostomy (PEG) tube ([address]) 09/24/2023    Psychiatric disorder     bipolar       Family History   Problem Relation Age of Onset    Coronary artery disease Maternal Aunt        Past Surgical History:   Procedure Laterality Date    IR BIOPSY LYMPH NODE  1/20/2023    IR GASTROSTOMY TUBE PLACEMENT  9/25/2023    IR LUMBAR PUNCTURE  9/6/2023    IR PORT PLACEMENT  3/14/2023    ORCHIECTOMY Left 12/14/2022    Procedure: ORCHIECTOMY INGUINAL;  Surgeon: Nicholas Aponte MD;  Location: [facility];  Service: Urology    PEG W/TRACHEOSTOMY PLACEMENT N/A 9/8/2023    Procedure: TRACHEOSTOMY WITH INSERTION PEG TUBE;  Surgeon: Amadeo Garcia MD;  Location: [facility] (formerly [facility]);  Service: General        reports that he has quit smoking. His smoking use included cigarettes. He started smoking about 15 years ago. He has a 2.50 pack-year smoking history. He has never used smokeless tobacco. He reports that he does not currently use alcohol. He reports current drug use. Drug: Marijuana.       Current Outpatient Medications:     acetylcysteine (MUCOMYST) 200 mg/mL nebulizer solution, Take 3 mL (600 mg total) by nebulization every 8 (eight) hours Do not start before September 29, 2023., Disp: , Rfl: 0    albuterol (2.5 mg/3 mL) 0.083 % nebulizer solution, Take 3 mL (2.5 mg total) by nebulization every 8 (eight) hours Do not start before September 29, 2023., Disp: , Rfl: 0    apixaban (ELIQUIS) 5 mg, 1 tablet (5 mg total) by Per PEG Tube route 2 (two) times a day, Disp: 60 tablet, Rfl: 2    cholecalciferol (VITAMIN D3) 1,000 units tablet, 1 tablet (1,000 Units total) by Per G Tube route daily, Disp: 30 tablet, Rfl: 0    FLUoxetine (PROzac) 10 mg capsule, 1 capsule (10 mg total) by Per G Tube route daily, Disp: 30 capsule, Rfl: 2    Incontinence Supply Disposable (Comfort Shield Adult Diapers) MISC, Use 1 each 4 (four) times a day, Disp: 48 each, Rfl: 5    Melatonin 3 MG/0.9ML LIQD, 3 mg by Per PEG Tube route daily at bedtime, Disp: 59 mL, Rfl: 2    Oral Hygiene Products (Toothette Swabs/Dentifrice) SWAB, Apply to the mouth or throat 3 (three) times a day, Disp: 1000 each, Rfl: 1    oxygen gas, Inhale 6 L/min continuous. Indications: Respiratory Failure, Disp: , Rfl:     polyethylene glycol (MIRALAX) 17 g packet, 17 g by Per PEG Tube route daily as needed (constipation), Disp: 10 each, Rfl: 0    Sennosides (senna) 8.8 mg/5 mL oral syrup, 10 mL (17.6 mg total) by Per PEG Tube route daily as needed (Constipation), Disp: 236 mL, Rfl: 0    thiamine 100 MG tablet, 1 tablet (100 mg total) by Per G Tube route daily, Disp: 30 tablet, Rfl: 2    The following portions of the patient's history were reviewed and updated as appropriate: allergies, current medications, past family history, past medical history, past social history, past surgical history and problem list.    Review of Systems   Unable to perform ROS: Other (non verbal)         PHQ-2/9 Depression Screening    Little interest or pleasure in doing things: 0 - not at all  Feeling down, depressed, or hopeless: 0 - not at all  Trouble falling or staying asleep, or sleeping too much: 0 - not at all  Feeling tired or having little energy: 0 - not at all  Poor appetite or overeatin - not at all  Feeling bad about yourself - or that you are a failure or have let yourself or your family down: 0 - not at all  Trouble concentrating on things, such as reading the newspaper or watching television: 0 - not at all  Moving or speaking so slowly that other people could have noticed.  Or the opposite - being so fidgety or restless that you have been moving around a lot more than usual: 0 - not at all  Thoughts that you would be better off dead, or of hurting yourself in some way: 0 - not at all  PHQ-9 Score: 0   PHQ-9 Interpretation: No or Minimal depression                Objective:    /64 (BP Location: Left arm, Patient Position: Sitting, Cuff Size: Large)   Pulse 80   Temp 98.1 °F (36.7 °C) (Tympanic)   Resp 20   Ht 6' 4" (1.93 m)   SpO2 97% Comment: on 6 Liters of O2  BMI 34.81 kg/m²      Physical Exam  Vitals and nursing note reviewed. Constitutional:       Appearance: Normal appearance. HENT:      Right Ear: External ear normal.      Left Ear: External ear normal.   Eyes:      General:         Right eye: No discharge. Left eye: No discharge. Conjunctiva/sclera: Conjunctivae normal.   Neck:      Trachea: Tracheostomy present. Cardiovascular:      Rate and Rhythm: Normal rate and regular rhythm. Heart sounds: No murmur heard. Pulmonary:      Effort: Pulmonary effort is normal.      Breath sounds: Normal breath sounds. No wheezing. Abdominal:      General: There is no distension. Palpations: Abdomen is soft. Tenderness: There is no abdominal tenderness. Musculoskeletal:      Right lower leg: No edema. Left lower leg: No edema. Skin:     Findings: No lesion or rash. Neurological:      Mental Status: He is alert. Mental status is at baseline. Psychiatric:         Mood and Affect: Mood normal.         Thought Content:  Thought content normal.           Recent Results (from the past 8736 hour(s))   CBC and differential    Collection Time: 12/10/22  5:53 PM   Result Value Ref Range    WBC 10.46 (H) 4.31 - 10.16 Thousand/uL    RBC 4.39 3.88 - 5.62 Million/uL    Hemoglobin 14.6 12.0 - 17.0 g/dL    Hematocrit 41.1 36.5 - 49.3 %    MCV 94 82 - 98 fL    MCH 33.3 26.8 - 34.3 pg    MCHC 35.5 31.4 - 37.4 g/dL    RDW 12.4 11.6 - 15.1 %    MPV 8.8 (L) 8.9 - 12.7 fL    Platelets 515 746 - 461 Thousands/uL    nRBC 0 /100 WBCs    Neutrophils Relative 63 43 - 75 %    Immat GRANS % 1 0 - 2 %    Lymphocytes Relative 25 14 - 44 %    Monocytes Relative 8 4 - 12 %    Eosinophils Relative 2 0 - 6 %    Basophils Relative 1 0 - 1 %    Neutrophils Absolute 6.66 1.85 - 7.62 Thousands/µL    Immature Grans Absolute 0.14 0.00 - 0.20 Thousand/uL    Lymphocytes Absolute 2. 61 0.60 - 4.47 Thousands/µL    Monocytes Absolute 0.80 0.17 - 1.22 Thousand/µL    Eosinophils Absolute 0.17 0.00 - 0.61 Thousand/µL    Basophils Absolute 0.08 0.00 - 0.10 Thousands/µL   Hepatic function panel    Collection Time: 12/10/22  5:53 PM   Result Value Ref Range    Total Bilirubin 0.34 0.20 - 1.00 mg/dL    Bilirubin, Direct 0.07 0.00 - 0.20 mg/dL    Alkaline Phosphatase 92 34 - 104 U/L    AST 23 13 - 39 U/L    ALT 51 7 - 52 U/L    Total Protein 7.4 6.4 - 8.4 g/dL    Albumin 4.3 3.5 - 5.0 g/dL   Basic metabolic panel    Collection Time: 12/10/22  5:53 PM   Result Value Ref Range    Sodium 129 (L) 135 - 147 mmol/L    Potassium 3.6 3.5 - 5.3 mmol/L    Chloride 92 (L) 96 - 108 mmol/L    CO2 29 21 - 32 mmol/L    ANION GAP 8 4 - 13 mmol/L    BUN 13 5 - 25 mg/dL    Creatinine 0.88 0.60 - 1.30 mg/dL    Glucose 86 65 - 140 mg/dL    Calcium 9.4 8.4 - 10.2 mg/dL    eGFR 111 ml/min/1.73sq m   Protime-INR    Collection Time: 12/10/22  5:53 PM   Result Value Ref Range    Protime 12.3 11.6 - 14.5 seconds    INR 0.88 0.84 - 1.19   APTT    Collection Time: 12/10/22  5:53 PM   Result Value Ref Range    PTT 25 23 - 37 seconds   TSH    Collection Time: 12/10/22  5:53 PM   Result Value Ref Range    TSH 3RD GENERATON 1.295 0.450 - 4.500 uIU/mL   Magnesium    Collection Time: 12/10/22  5:53 PM   Result Value Ref Range    Magnesium 2.0 1.9 - 2.7 mg/dL   Phosphorus    Collection Time: 12/10/22  5:53 PM   Result Value Ref Range    Phosphorus 3.3 2.7 - 4.5 mg/dL   Lipase    Collection Time: 12/10/22  5:53 PM   Result Value Ref Range    Lipase 40 11 - 82 u/L   Lactic acid    Collection Time: 12/10/22  5:53 PM   Result Value Ref Range    LACTIC ACID 0.9 0.5 - 2.0 mmol/L   B-Type Natriuretic Peptide(BNP) AN, CA, EA Campuses Only    Collection Time: 12/10/22  5:53 PM   Result Value Ref Range    BNP 7 0 - 100 pg/mL   UA (URINE) with reflex to Scope    Collection Time: 12/10/22  5:53 PM   Result Value Ref Range    Color, UA Yellow Yellow Clarity, UA Clear Clear    Specific Gravity, UA 1.020 1.001 - 1.030    pH, UA 6.0 5.0, 5.5, 6.0, 6.5, 7.0, 7.5, 8.0    Leukocytes, UA Negative Negative    Nitrite, UA Negative Negative    Protein, UA Negative Negative, Interference- unable to analyze mg/dl    Glucose, UA Negative Negative mg/dl    Ketones, UA Negative Negative mg/dl    Urobilinogen, UA 0.2 0.2, 1.0 E.U./dl E.U./dl    Bilirubin, UA Negative Negative    Occult Blood, UA Negative Negative   Uric acid    Collection Time: 12/10/22  5:53 PM   Result Value Ref Range    Uric Acid 3.4 (L) 3.5 - 8.5 mg/dL   Hemoglobin A1C    Collection Time: 12/10/22  5:53 PM   Result Value Ref Range    Hemoglobin A1C 5.2 Normal 3.8-5.6%; PreDiabetic 5.7-6.4%;  Diabetic >=6.5%; Glycemic control for adults with diabetes <7.0% %     mg/dl   Lipid panel    Collection Time: 12/10/22  5:53 PM   Result Value Ref Range    Cholesterol 183 See Comment mg/dL    Triglycerides 280 (H) See Comment mg/dL    HDL, Direct 45 >=40 mg/dL    LDL Calculated 82 0 - 100 mg/dL    Non-HDL-Chol (CHOL-HDL) 138 mg/dl   ECG 12 lead    Collection Time: 12/10/22  6:06 PM   Result Value Ref Range    Ventricular Rate 98 BPM    Atrial Rate 98 BPM    MS Interval 164 ms    QRSD Interval 88 ms    QT Interval 354 ms    QTC Interval 451 ms    P Axis 50 degrees    QRS Axis 79 degrees    T Wave [place] 48 degrees   HS Troponin 0hr (reflex protocol)    Collection Time: 12/10/22 10:43 PM   Result Value Ref Range    hs TnI 0hr 3 "Refer to ACS Flowchart"- see link ng/L   COVID/FLU/RSV    Collection Time: 12/10/22 10:46 PM    Specimen: Nose; Nares   Result Value Ref Range    SARS-CoV-2 Negative Negative    INFLUENZA A PCR Negative Negative    INFLUENZA B PCR Negative Negative    RSV PCR Negative Negative   Sodium, urine, random    Collection Time: 12/11/22 12:07 AM   Result Value Ref Range    Sodium, Ur 73    Osmolality, urine    Collection Time: 12/11/22 12:07 AM   Result Value Ref Range    Osmolality, Ur 729 250 - 900 mmol/KG   HS Troponin I 2hr    Collection Time: 12/11/22 12:44 AM   Result Value Ref Range    hs TnI 2hr 3 "Refer to ACS Flowchart"- see link ng/L    Delta 2hr hsTnI 0 <20 ng/L   Cortisol Level, AM Specimen    Collection Time: 12/11/22  4:44 AM   Result Value Ref Range    Cortisol - AM 5.6 4.2 - 22.4 ug/dL   AFP tumor marker    Collection Time: 12/11/22  4:44 AM   Result Value Ref Range    AFP TUMOR MARKER 1.4 0.5 - 8 ng/mL   Lactate dehydrogenase    Collection Time: 12/11/22  4:44 AM   Result Value Ref Range     140 - 271 U/L   hCG, serum, qualitative    Collection Time: 12/11/22  4:44 AM   Result Value Ref Range    Preg, Serum Negative Negative   Basic metabolic panel    Collection Time: 12/11/22  4:44 AM   Result Value Ref Range    Sodium 131 (L) 135 - 147 mmol/L    Potassium 3.5 3.5 - 5.3 mmol/L    Chloride 97 96 - 108 mmol/L    CO2 24 21 - 32 mmol/L    ANION GAP 10 4 - 13 mmol/L    BUN 12 5 - 25 mg/dL    Creatinine 0.73 0.60 - 1.30 mg/dL    Glucose 91 65 - 140 mg/dL    Calcium 8.9 8.4 - 10.2 mg/dL    eGFR 120 ml/min/1.73sq m   CBC and differential    Collection Time: 12/11/22  4:44 AM   Result Value Ref Range    WBC 9.50 4.31 - 10.16 Thousand/uL    RBC 4.22 3.88 - 5.62 Million/uL    Hemoglobin 14.0 12.0 - 17.0 g/dL    Hematocrit 39.4 36.5 - 49.3 %    MCV 93 82 - 98 fL    MCH 33.2 26.8 - 34.3 pg    MCHC 35.5 31.4 - 37.4 g/dL    RDW 12.8 11.6 - 15.1 %    MPV 9.2 8.9 - 12.7 fL    Platelets 374 566 - 375 Thousands/uL    nRBC 0 /100 WBCs    Neutrophils Relative 62 43 - 75 %    Immat GRANS % 1 0 - 2 %    Lymphocytes Relative 27 14 - 44 %    Monocytes Relative 7 4 - 12 %    Eosinophils Relative 2 0 - 6 %    Basophils Relative 1 0 - 1 %    Neutrophils Absolute 5.88 1.85 - 7.62 Thousands/µL    Immature Grans Absolute 0.12 0.00 - 0.20 Thousand/uL    Lymphocytes Absolute 2.56 0.60 - 4.47 Thousands/µL    Monocytes Absolute 0.69 0.17 - 1.22 Thousand/µL    Eosinophils Absolute 0.19 0.00 - 0.61 Thousand/µL Basophils Absolute 0.06 0.00 - 0.10 Thousands/µL   D-dimer, quantitative    Collection Time: 12/11/22  7:57 PM   Result Value Ref Range    D-Dimer, Quant <0.27 <0.50 ug/ml FEU   Osmolality-"If this is regarding a toxic alcohol, STOP. Test is not routinely indicated.  Please consult medical  on call for further guidance."    Collection Time: 12/12/22  4:34 AM   Result Value Ref Range    Osmolality Serum 275 (L) 282 - 298 mmol/KG   CBC    Collection Time: 12/12/22  4:34 AM   Result Value Ref Range    WBC 9.71 4.31 - 10.16 Thousand/uL    RBC 4.22 3.88 - 5.62 Million/uL    Hemoglobin 14.2 12.0 - 17.0 g/dL    Hematocrit 40.0 36.5 - 49.3 %    MCV 95 82 - 98 fL    MCH 33.6 26.8 - 34.3 pg    MCHC 35.5 31.4 - 37.4 g/dL    RDW 12.5 11.6 - 15.1 %    Platelets 086 430 - 206 Thousands/uL    MPV 9.1 8.9 - 12.7 fL   Basic metabolic panel    Collection Time: 12/12/22  4:34 AM   Result Value Ref Range    Sodium 129 (L) 135 - 147 mmol/L    Potassium 3.9 3.5 - 5.3 mmol/L    Chloride 94 (L) 96 - 108 mmol/L    CO2 25 21 - 32 mmol/L    ANION GAP 10 4 - 13 mmol/L    BUN 10 5 - 25 mg/dL    Creatinine 0.76 0.60 - 1.30 mg/dL    Glucose 89 65 - 140 mg/dL    Calcium 9.4 8.4 - 10.2 mg/dL    eGFR 118 ml/min/1.73sq m   HCG, tumor marker    Collection Time: 12/12/22  4:34 AM   Result Value Ref Range    hCG Tumor Marker <2 <5 mlU/mL   Echo complete w/ contrast if indicated    Collection Time: 12/12/22 12:36 PM   Result Value Ref Range    LA size 3.7 cm    LVPWd 1.20 cm    Left Atrium Area-systolic Four Chamber 17.0 cm2    MV E' Tissue Velocity Septal 9 cm/s    RA 2D Volume 22.0 mL    IVSd 1.61 cm    LV DIASTOLIC VOLUME (MOD BIPLANE) 2D 68 mL    LEFT VENTRICLE SYSTOLIC VOLUME (MOD BIPLANE) 2D 30 mL    Left ventricular stroke volume (2D) 37.00 mL    A4C EF 66 %    LVIDd 3.90 cm    IVS 1.2 cm    LVIDS 2.80 cm    FS 28 28 - 44 %    Ao root 3.20 cm    RVID d 2.6 cm    MV valve area p 1/2 method 3.55 cm2    E wave deceleration time 214 ms MV Peak E Tono 65 cm/s    MV Peak A Tono 0.53 m/s    RAA A4C 11.1 cm2    MV stenosis pressure 1/2 time 62 ms    LVSV, 2D 37 mL    LV EF 60    Basic metabolic panel    Collection Time: 12/13/22  4:41 AM   Result Value Ref Range    Sodium 127 (L) 135 - 147 mmol/L    Potassium 3.9 3.5 - 5.3 mmol/L    Chloride 94 (L) 96 - 108 mmol/L    CO2 24 21 - 32 mmol/L    ANION GAP 9 4 - 13 mmol/L    BUN 14 5 - 25 mg/dL    Creatinine 0.78 0.60 - 1.30 mg/dL    Glucose 91 65 - 140 mg/dL    Calcium 9.1 8.4 - 10.2 mg/dL    eGFR 116 ml/min/1.73sq m   CBC and differential    Collection Time: 12/13/22  4:41 AM   Result Value Ref Range    WBC 10.03 4.31 - 10.16 Thousand/uL    RBC 4.17 3.88 - 5.62 Million/uL    Hemoglobin 13.9 12.0 - 17.0 g/dL    Hematocrit 39.3 36.5 - 49.3 %    MCV 94 82 - 98 fL    MCH 33.3 26.8 - 34.3 pg    MCHC 35.4 31.4 - 37.4 g/dL    RDW 12.5 11.6 - 15.1 %    MPV 9.0 8.9 - 12.7 fL    Platelets 779 531 - 053 Thousands/uL    nRBC 0 /100 WBCs    Neutrophils Relative 69 43 - 75 %    Immat GRANS % 1 0 - 2 %    Lymphocytes Relative 21 14 - 44 %    Monocytes Relative 8 4 - 12 %    Eosinophils Relative 1 0 - 6 %    Basophils Relative 0 0 - 1 %    Neutrophils Absolute 6.82 1.85 - 7.62 Thousands/µL    Immature Grans Absolute 0.12 0.00 - 0.20 Thousand/uL    Lymphocytes Absolute 2.13 0.60 - 4.47 Thousands/µL    Monocytes Absolute 0.78 0.17 - 1.22 Thousand/µL    Eosinophils Absolute 0.14 0.00 - 0.61 Thousand/µL    Basophils Absolute 0.04 0.00 - 0.10 Thousands/µL   Basic metabolic panel    Collection Time: 12/13/22 12:26 PM   Result Value Ref Range    Sodium 125 (L) 135 - 147 mmol/L    Potassium 3.6 3.5 - 5.3 mmol/L    Chloride 92 (L) 96 - 108 mmol/L    CO2 24 21 - 32 mmol/L    ANION GAP 9 4 - 13 mmol/L    BUN 12 5 - 25 mg/dL    Creatinine 0.77 0.60 - 1.30 mg/dL    Glucose 114 65 - 140 mg/dL    Calcium 9.2 8.4 - 10.2 mg/dL    eGFR 117 ml/min/1.73sq m   Cortisol    Collection Time: 12/13/22  2:31 PM   Result Value Ref Range Cortisol, Random 4.1 ug/dL   ACTH    Collection Time: 12/13/22  2:31 PM   Result Value Ref Range    ACTH 36.2 7.2 - 63.3 pg/mL   Cortisol Stimulation Test    Collection Time: 12/13/22  2:31 PM   Result Value Ref Range    Cortisol, Random 17.1 ug/dL   Cortisol    Collection Time: 12/13/22  4:40 PM   Result Value Ref Range    Cortisol, Random 22.4 ug/dL   Basic metabolic panel    Collection Time: 12/13/22 11:53 PM   Result Value Ref Range    Sodium 129 (L) 135 - 147 mmol/L    Potassium 3.5 3.5 - 5.3 mmol/L    Chloride 95 (L) 96 - 108 mmol/L    CO2 24 21 - 32 mmol/L    ANION GAP 10 4 - 13 mmol/L    BUN 16 5 - 25 mg/dL    Creatinine 0.88 0.60 - 1.30 mg/dL    Glucose 111 65 - 140 mg/dL    Calcium 9.0 8.3 - 10.1 mg/dL    eGFR 111 ml/min/1.73sq m   Basic metabolic panel    Collection Time: 12/14/22  4:32 AM   Result Value Ref Range    Sodium 130 (L) 135 - 147 mmol/L    Potassium 3.5 3.5 - 5.3 mmol/L    Chloride 94 (L) 96 - 108 mmol/L    CO2 28 21 - 32 mmol/L    ANION GAP 8 4 - 13 mmol/L    BUN 14 5 - 25 mg/dL    Creatinine 0.85 0.60 - 1.30 mg/dL    Glucose 105 65 - 140 mg/dL    Calcium 9.2 8.3 - 10.1 mg/dL    eGFR 112 ml/min/1.73sq m   Magnesium    Collection Time: 12/14/22  4:32 AM   Result Value Ref Range    Magnesium 2.6 1.6 - 2.6 mg/dL   CBC and differential    Collection Time: 12/14/22  4:32 AM   Result Value Ref Range    WBC 11.31 (H) 4.31 - 10.16 Thousand/uL    RBC 4.38 3.88 - 5.62 Million/uL    Hemoglobin 14.6 12.0 - 17.0 g/dL    Hematocrit 41.2 36.5 - 49.3 %    MCV 94 82 - 98 fL    MCH 33.3 26.8 - 34.3 pg    MCHC 35.4 31.4 - 37.4 g/dL    RDW 12.6 11.6 - 15.1 %    MPV 8.8 (L) 8.9 - 12.7 fL    Platelets 917 894 - 150 Thousands/uL    nRBC 0 /100 WBCs    Neutrophils Relative 73 43 - 75 %    Immat GRANS % 1 0 - 2 %    Lymphocytes Relative 17 14 - 44 %    Monocytes Relative 7 4 - 12 %    Eosinophils Relative 1 0 - 6 %    Basophils Relative 1 0 - 1 %    Neutrophils Absolute 8.23 (H) 1.85 - 7.62 Thousands/µL    Immature Grans Absolute 0.11 0.00 - 0.20 Thousand/uL    Lymphocytes Absolute 1.97 0.60 - 4.47 Thousands/µL    Monocytes Absolute 0.84 0.17 - 1.22 Thousand/µL    Eosinophils Absolute 0.10 0.00 - 0.61 Thousand/µL    Basophils Absolute 0.06 0.00 - 0.10 Thousands/µL   Tissue Exam    Collection Time: 12/14/22  9:24 AM   Result Value Ref Range    Case Report       Surgical Pathology Report                         Case: [id number]                                   Authorizing Provider:  Roberto Kaminski MD    Collected:           12/14/2022 1389              Ordering Location:     [facility]      Received:            12/14/2022 [address] Operating Room                                                      Pathologist:           Cindi Ibarra DO                                                            Specimen:    Testis, Left, LEFT TESTICLE AND SPERMATIC CORD                                             Final Diagnosis       A. Left testicle and spermatic cord, radical orchiectomy:  -Seminoma, grossly measuring 4.5 cm in greatest dimension.  -Largest viable tumor focus measures 0.9 cm in greatest dimension.   -All margins negative for carcinoma.  -Definitive lymph-vascular invasion not identified, see note  -Extensive necrosis and fibrosis present. -SEE SYNOPTIC REPORT    Comment: Tumor cells are positive for OCT3/4 and  and negative for CD45, CK-AE1/AE3, CD30, glypican-3, AFP, Beta-HCG, and inhibin      Note       Small focus of tumor is present within lymphvascular space without surrounding fibrin or clot. This is favored to represent carryover. Intradepartmental consultation (AT) is in agreement  Dr. Roberto Kaminski was notified by Dr. Cindi Ibarra via [employer] at 15:03 on 12/21/2022. Additional Information       All reported additional testing was performed with appropriately reactive controls.   These tests were developed and their performance characteristics determined by [facility] or appropriate performing facility, though some tests may be performed on tissues which have not been validated for performance characteristics (such as staining performed on alcohol exposed cell blocks and decalcified tissues). Results should be interpreted with caution and in the context of the patients’ clinical condition. These tests may not be cleared or approved by the U.S. Food and Drug Administration, though the FDA has determined that such clearance or approval is not necessary. These tests are used for clinical purposes and they should not be regarded as investigational or for research. This laboratory has been approved by CLIA 88, designated as a high-complexity laboratory and is qualified to perform these tests. Interpretation performed at [facility], [address], [place] [address]      Synoptic Checklist       TESTIS: Radical Orchiectomy   TESTIS: RADICAL ORCHIECTOMY - All Specimens   8th Edition - Protocol posted: 11/10/2021         CLINICAL      Pre-Orchiectomy Serum Tumor Markers:    Serum marker studies within normal limits       Post-Orchiectomy Serum Tumor Markers:    Unknown       Serum Tumor Markers (S):    S0 (serum marker study levels within normal limits)       SPECIMEN      Specimen Laterality:    Left       TUMOR      Tumor Focality:    Unifocal       Tumor Size:    Greatest dimension of main tumor mass (Centimeters): 4.5 cm      Histologic Type:    Seminoma       Tumor Extent:    Limited to testis       Lymphovascular Invasion:    Not identified       MARGINS      Margin Status:     All margins negative for tumor       REGIONAL LYMPH NODES      Regional Lymph Node Status:    Not applicable (no regional lymph nodes submitted or found)       PATHOLOGIC STAGE CLASSIFICATION (pTNM, AJCC 8th Edition)      Reporting of pT, pN, and (when applicable) pM categories is based on information available to the pathologist at the time the report is issued. As per the AJCC (Chapter 1, 8th Ed.) it is the managing physician’s responsibility to establish the final pathologic stage based upon all pertinent information, including but potentially not limited to this pathology report. pT Category:    pT1b       pN Category:    pN not assigned (no nodes submitted or found)       Gross Description       A. The specimen is received in formalin, labeled with the patient's name and hospital number, and is designated "left testicle and spermatic cord." It consists of a 99.0 g (after fixation) left testicle to include: epididymis and spermatic cord. The spermatic cord is tan, contains attached lobules of adipose tissue, is focally shaggy and measures 5.7 cm in length by 2.1 cm in average diameter. Sectioning the spermatic cord reveals grossly unremarkable cut surfaces. No lymph node candidates are identified in the attached soft tissue. The testicle measures 5.2 x 4.2 x 3.5 cm. The tunica vaginalis is tan-purple, rubbery, stretched, easily movable, and contains a small amount of attached soft tissue. No lymph node candidates are grossly identified within the attached soft tissue. The testicle is bivalved to reveal fluid trapped between the tunica albuginea and the tunica vaginalis. The tunica albuginea is tan-white, intact, and glistening. Occupying the entire testicle is a 4.5 x 3.8 x 1.5 cm tan-yellow, soft, and multi-nodular mass. The mass abuts the tunica albuginea, is 0.4 cm from the closest tunica vaginalis, 0.3 cm from the epididymis, and more than 3.0 cm from the spermatic cord resection margin. Sectioning the mass reveals tan-yellow and soft cut surfaces. The mass appears to be grossly confined to the epididymis. No uninvolved testicular parenchyma is grossly identified. A C-shaped and distorted epididymis is present and measures 5.5 x 1.3 cm. Sectioning the epidiymis reveals tan and lobulated cut surfaces. Digital images are taken. Representative sections to include more than one section per cm of mass are submitted as follows:    A1: Spermatic cord resection margin, en face (away from the testicle)  A2: Midportion of the spermatic cord  A3: Distal portion of the spermatic cord (adjacent to the testicle)  A4: Full-thickness section of mass to include the closest tunica albuginea and the closest tunica vaginalis  A5-A6: Full-thickness sections of mass to include the tunicae  A7-A8: Mass to demonstrate its relationship to the closest epididymis  A9-A10: Additional full-thickness sections of mass  A11: Thickened tunica vaginalis    Note: The estimated total formalin fixation time based upon information provided by the submitting clinician and the standard processing schedule is under 72 hours. HPolster      Clinical Information AFP, HCG, LDH WNL    Basic metabolic panel    Collection Time: 12/14/22  1:14 PM   Result Value Ref Range    Sodium 128 (L) 135 - 147 mmol/L    Potassium 3.9 3.5 - 5.3 mmol/L    Chloride 95 (L) 96 - 108 mmol/L    CO2 24 21 - 32 mmol/L    ANION GAP 9 4 - 13 mmol/L    BUN 15 5 - 25 mg/dL    Creatinine 1.07 0.60 - 1.30 mg/dL    Glucose 154 (H) 65 - 140 mg/dL    Calcium 9.3 8.3 - 10.1 mg/dL    eGFR 89 ml/min/1.73sq m   Tissue Exam    Collection Time: 01/20/23 10:50 AM   Result Value Ref Range    Case Report       Surgical Pathology Report                         Case: [id number]                                   Authorizing Provider:   Salbador Rush MD          Collected:           01/20/2023 1050              Ordering Location:     [facility]      Received:            01/20/2023 [address] [facility] CAT Scan                                                            Pathologist:           Romie Cuello MD                                                                 Specimen:    Lymph Node, retroperitoneal Final Diagnosis       A. Lymph Node, retroperitoneal:  - Fibrovascular and lymphoid tissue with extensive necrosis, most consistent with tumor necrosis. - No viable tumor cells present. Comment: Patient medical history of seminoma is noted. Immunohistochemistry for OCT3/4, , D2-40, AE1/3 are performed on tissue block A4 to help in the assessment of this case and they are non-contributory. CD68 highlights histiocytes. If clinical indicated, re-biopsy is recommended. Note       Interpretation performed at [facility] [address] [place], [place], [address]        Additional Information       All reported additional testing was performed with appropriately reactive controls. These tests were developed and their performance characteristics determined by [facility] or appropriate performing facility, though some tests may be performed on tissues which have not been validated for performance characteristics (such as staining performed on alcohol exposed cell blocks and decalcified tissues). Results should be interpreted with caution and in the context of the patients’ clinical condition. These tests may not be cleared or approved by the U.S. Food and Drug Administration, though the FDA has determined that such clearance or approval is not necessary. These tests are used for clinical purposes and they should not be regarded as investigational or for research. This laboratory has been approved by CLIA 88, designated as a high-complexity laboratory and is qualified to perform these tests. [facility] Description       A. The specimen is received in formalin, labeled with the patient's name and hospital number, and is designated " retroperitoneal lymph node". The specimen consists of 4 tan friable tissue fragments measuring 0.2-0.3 cm in greatest dimension. The specimen is entirely submitted between sponges, 4 cassettes.   Note: due to the size and consistency of specimen, the tissue may not survive histologic processing. Note: The estimated total formalin fixation time based upon information provided by the submitting clinician and the standard processing schedule is under 72 hours.   Mozier Majestic      Clinical Information left testicular mass, suspected seminoma    Leukemia/Lymphoma flow cytometry    Collection Time: 01/20/23 10:57 AM   Result Value Ref Range    Scan Result SEE WRITTEN REPORT    HCG, tumor marker    Collection Time: 02/16/23 11:43 AM   Result Value Ref Range    hCG Tumor Marker <2 <5 mlU/mL   AFP tumor marker    Collection Time: 02/16/23 11:43 AM   Result Value Ref Range    AFP TUMOR MARKER 1.6 0.5 - 8 ng/mL   LD,Blood    Collection Time: 02/16/23 11:43 AM   Result Value Ref Range     (H) 81 - 234 U/L   CBC and differential    Collection Time: 02/16/23 11:43 AM   Result Value Ref Range    WBC 12.34 (H) 4.31 - 10.16 Thousand/uL    RBC 4.26 3.88 - 5.62 Million/uL    Hemoglobin 14.2 12.0 - 17.0 g/dL    Hematocrit 42.0 36.5 - 49.3 %    MCV 99 (H) 82 - 98 fL    MCH 33.3 26.8 - 34.3 pg    MCHC 33.8 31.4 - 37.4 g/dL    RDW 13.5 11.6 - 15.1 %    MPV 9.8 8.9 - 12.7 fL    Platelets 505 327 - 919 Thousands/uL    nRBC 0 /100 WBCs    Neutrophils Relative 71 43 - 75 %    Immat GRANS % 1 0 - 2 %    Lymphocytes Relative 19 14 - 44 %    Monocytes Relative 7 4 - 12 %    Eosinophils Relative 1 0 - 6 %    Basophils Relative 1 0 - 1 %    Neutrophils Absolute 8.77 (H) 1.85 - 7.62 Thousands/µL    Immature Grans Absolute 0.14 0.00 - 0.20 Thousand/uL    Lymphocytes Absolute 2.36 0.60 - 4.47 Thousands/µL    Monocytes Absolute 0.85 0.17 - 1.22 Thousand/µL    Eosinophils Absolute 0.12 0.00 - 0.61 Thousand/µL    Basophils Absolute 0.10 0.00 - 0.10 Thousands/µL   Comprehensive metabolic panel    Collection Time: 02/16/23 11:43 AM   Result Value Ref Range    Sodium 136 135 - 147 mmol/L    Potassium 3.5 3.5 - 5.3 mmol/L    Chloride 103 96 - 108 mmol/L    CO2 26 21 - 32 mmol/L    ANION GAP 7 4 - 13 mmol/L    BUN 13 5 - 25 mg/dL    Creatinine 0.85 0.60 - 1.30 mg/dL    Glucose 106 65 - 140 mg/dL    Calcium 9.5 8.3 - 10.1 mg/dL    AST 24 5 - 45 U/L    ALT 54 12 - 78 U/L    Alkaline Phosphatase 114 46 - 116 U/L    Total Protein 7.2 6.4 - 8.4 g/dL    Albumin 3.8 3.5 - 5.0 g/dL    Total Bilirubin 0.42 0.20 - 1.00 mg/dL    eGFR 112 ml/min/1.73sq m   CBC and differential    Collection Time: 03/19/23 11:52 AM   Result Value Ref Range    WBC 11.36 (H) 4.31 - 10.16 Thousand/uL    RBC 4.70 3.88 - 5.62 Million/uL    Hemoglobin 15.3 12.0 - 17.0 g/dL    Hematocrit 46.0 36.5 - 49.3 %    MCV 98 82 - 98 fL    MCH 32.6 26.8 - 34.3 pg    MCHC 33.3 31.4 - 37.4 g/dL    RDW 13.2 11.6 - 15.1 %    MPV 8.9 8.9 - 12.7 fL    Platelets 422 366 - 020 Thousands/uL    nRBC 0 /100 WBCs    Neutrophils Relative 73 43 - 75 %    Immat GRANS % 1 0 - 2 %    Lymphocytes Relative 17 14 - 44 %    Monocytes Relative 7 4 - 12 %    Eosinophils Relative 1 0 - 6 %    Basophils Relative 1 0 - 1 %    Neutrophils Absolute 8.38 (H) 1.85 - 7.62 Thousands/µL    Immature Grans Absolute 0.08 0.00 - 0.20 Thousand/uL    Lymphocytes Absolute 1.97 0.60 - 4.47 Thousands/µL    Monocytes Absolute 0.74 0.17 - 1.22 Thousand/µL    Eosinophils Absolute 0.13 0.00 - 0.61 Thousand/µL    Basophils Absolute 0.06 0.00 - 0.10 Thousands/µL   Comprehensive metabolic panel    Collection Time: 03/19/23 11:52 AM   Result Value Ref Range    Sodium 136 135 - 147 mmol/L    Potassium 3.5 3.5 - 5.3 mmol/L    Chloride 97 96 - 108 mmol/L    CO2 31 21 - 32 mmol/L    ANION GAP 8 4 - 13 mmol/L    BUN 13 5 - 25 mg/dL    Creatinine 1.05 0.60 - 1.30 mg/dL    Glucose 106 65 - 140 mg/dL    Calcium 9.7 8.4 - 10.2 mg/dL    AST 21 13 - 39 U/L    ALT 42 7 - 52 U/L    Alkaline Phosphatase 127 (H) 34 - 104 U/L    Total Protein 7.7 6.4 - 8.4 g/dL    Albumin 4.4 3.5 - 5.0 g/dL    Total Bilirubin 0.71 0.20 - 1.00 mg/dL    eGFR 91 ml/min/1.73sq m   Magnesium    Collection Time: 03/19/23 11:52 AM   Result Value Ref Range    Magnesium 2.1 1.9 - 2.7 mg/dL   CBC and differential    Collection Time: 04/02/23  1:22 PM   Result Value Ref Range    WBC 4.14 (L) 4.31 - 10.16 Thousand/uL    RBC 3.79 (L) 3.88 - 5.62 Million/uL    Hemoglobin 12.6 12.0 - 17.0 g/dL    Hematocrit 34.6 (L) 36.5 - 49.3 %    MCV 91 82 - 98 fL    MCH 33.2 26.8 - 34.3 pg    MCHC 36.4 31.4 - 37.4 g/dL    RDW 12.6 11.6 - 15.1 %    MPV 10.2 8.9 - 12.7 fL    Platelets 55 (L) 236 - 390 Thousands/uL   Basic metabolic panel    Collection Time: 04/02/23  1:22 PM   Result Value Ref Range    Sodium 130 (L) 135 - 147 mmol/L    Potassium 2.9 (L) 3.5 - 5.3 mmol/L    Chloride 91 (L) 96 - 108 mmol/L    CO2 30 21 - 32 mmol/L    ANION GAP 9 4 - 13 mmol/L    BUN 19 5 - 25 mg/dL    Creatinine 0.86 0.60 - 1.30 mg/dL    Glucose 92 65 - 140 mg/dL    Calcium 9.0 8.4 - 10.2 mg/dL    eGFR 112 ml/min/1.73sq m   Hepatic function panel    Collection Time: 04/02/23  1:22 PM   Result Value Ref Range    Total Bilirubin 0.44 0.20 - 1.00 mg/dL    Bilirubin, Direct 0.17 0.00 - 0.20 mg/dL    Alkaline Phosphatase 130 (H) 34 - 104 U/L    AST 25 13 - 39 U/L    ALT 71 (H) 7 - 52 U/L    Total Protein 6.5 6.4 - 8.4 g/dL    Albumin 3.7 3.5 - 5.0 g/dL   Magnesium    Collection Time: 04/02/23  1:22 PM   Result Value Ref Range    Magnesium 1.6 (L) 1.9 - 2.7 mg/dL   Phosphorus    Collection Time: 04/02/23  1:22 PM   Result Value Ref Range    Phosphorus 2.5 (L) 2.7 - 4.5 mg/dL   CK    Collection Time: 04/02/23  1:22 PM   Result Value Ref Range    Total CK 42 39 - 308 U/L   Manual Differential(PHLEBS Do Not Order)    Collection Time: 04/02/23  1:22 PM   Result Value Ref Range    Segmented % 15 (L) 43 - 75 %    Bands % 24 (H) 0 - 8 %    Lymphocytes % 38 14 - 44 %    Monocytes % 14 (H) 4 - 12 %    Eosinophils, % 0 0 - 6 %    Basophils % 2 (H) 0 - 1 %    Metamyelocytes% 2 (H) 0 - 1 %    Myelocytes % 1 0 - 1 %    Atypical Lymphocytes % 4 (H) <=0 %    Absolute Neutrophils 1.61 (L) 1.85 - 7.62 Thousand/uL    Lymphocytes Absolute 1.57 0.60 - 4.47 Thousand/uL    Monocytes Absolute 0.58 0.00 - 1.22 Thousand/uL    Eosinophils Absolute 0.00 0.00 - 0.40 Thousand/uL    Basophils Absolute 0.08 0.00 - 0.10 Thousand/uL    Total Counted      Dohle Bodies Present     RBC Morphology Normal     Platelet Estimate Decreased (A) Adequate   CBC and differential    Collection Time: 04/10/23 10:30 AM   Result Value Ref Range    WBC 13.88 (H) 4.31 - 10.16 Thousand/uL    RBC 3.64 (L) 3.88 - 5.62 Million/uL    Hemoglobin 12.1 12.0 - 17.0 g/dL    Hematocrit 34.6 (L) 36.5 - 49.3 %    MCV 95 82 - 98 fL    MCH 33.2 26.8 - 34.3 pg    MCHC 35.0 31.4 - 37.4 g/dL    RDW 13.0 11.6 - 15.1 %    MPV 9.4 8.9 - 12.7 fL    Platelets 716 528 - 935 Thousands/uL   Comprehensive metabolic panel    Collection Time: 04/10/23 10:30 AM   Result Value Ref Range    Sodium 136 135 - 147 mmol/L    Potassium 3.7 3.5 - 5.3 mmol/L    Chloride 99 96 - 108 mmol/L    CO2 29 21 - 32 mmol/L    ANION GAP 8 4 - 13 mmol/L    BUN 13 5 - 25 mg/dL    Creatinine 0.87 0.60 - 1.30 mg/dL    Glucose 84 65 - 140 mg/dL    Calcium 9.2 8.4 - 10.2 mg/dL    AST 29 13 - 39 U/L    ALT 84 (H) 7 - 52 U/L    Alkaline Phosphatase 127 (H) 34 - 104 U/L    Total Protein 6.7 6.4 - 8.4 g/dL    Albumin 3.9 3.5 - 5.0 g/dL    Total Bilirubin 0.40 0.20 - 1.00 mg/dL    eGFR 111 ml/min/1.73sq m   Magnesium    Collection Time: 04/10/23 10:30 AM   Result Value Ref Range    Magnesium 1.8 (L) 1.9 - 2.7 mg/dL   Manual Differential(PHLEBS Do Not Order)    Collection Time: 04/10/23 10:30 AM   Result Value Ref Range    Segmented % 71 43 - 75 %    Bands % 2 0 - 8 %    Lymphocytes % 13 (L) 14 - 44 %    Monocytes % 6 4 - 12 %    Eosinophils, % 2 0 - 6 %    Basophils % 0 0 - 1 %    Myelocytes % 6 (H) 0 - 1 %    Absolute Neutrophils 10.13 (H) 1.85 - 7.62 Thousand/uL    Lymphocytes Absolute 1.80 0.60 - 4.47 Thousand/uL    Monocytes Absolute 0.83 0.00 - 1.22 Thousand/uL    Eosinophils Absolute 0.28 0.00 - 0.40 Thousand/uL    Basophils Absolute 0.00 0.00 - 0.10 Thousand/uL    Total Counted      RBC Morphology Normal     Platelet Estimate Adequate Adequate   CBC and differential    Collection Time: 04/30/23  9:12 AM   Result Value Ref Range    WBC 14.40 (H) 4.31 - 10.16 Thousand/uL    RBC 3.66 (L) 3.88 - 5.62 Million/uL    Hemoglobin 11.9 (L) 12.0 - 17.0 g/dL    Hematocrit 35.6 (L) 36.5 - 49.3 %    MCV 97 82 - 98 fL    MCH 32.5 26.8 - 34.3 pg    MCHC 33.4 31.4 - 37.4 g/dL    RDW 15.3 (H) 11.6 - 15.1 %    MPV 9.3 8.9 - 12.7 fL    Platelets 614 272 - 032 Thousands/uL   Comprehensive metabolic panel    Collection Time: 04/30/23  9:12 AM   Result Value Ref Range    Sodium 134 (L) 135 - 147 mmol/L    Potassium 3.6 3.5 - 5.3 mmol/L    Chloride 100 96 - 108 mmol/L    CO2 28 21 - 32 mmol/L    ANION GAP 6 4 - 13 mmol/L    BUN 10 5 - 25 mg/dL    Creatinine 0.85 0.60 - 1.30 mg/dL    Glucose, Fasting 90 65 - 99 mg/dL    Calcium 9.1 8.4 - 10.2 mg/dL    AST 25 13 - 39 U/L    ALT 66 (H) 7 - 52 U/L    Alkaline Phosphatase 126 (H) 34 - 104 U/L    Total Protein 7.0 6.4 - 8.4 g/dL    Albumin 3.8 3.5 - 5.0 g/dL    Total Bilirubin 0.54 0.20 - 1.00 mg/dL    eGFR 112 ml/min/1.73sq m   Magnesium    Collection Time: 04/30/23  9:12 AM   Result Value Ref Range    Magnesium 1.9 1.9 - 2.7 mg/dL   Manual Differential(PHLEBS Do Not Order)    Collection Time: 04/30/23  9:12 AM   Result Value Ref Range    Segmented % 64 43 - 75 %    Bands % 2 0 - 8 %    Lymphocytes % 22 14 - 44 %    Monocytes % 6 4 - 12 %    Eosinophils, % 0 0 - 6 %    Basophils % 0 0 - 1 %    Metamyelocytes% 1 0 - 1 %    Myelocytes % 4 (H) 0 - 1 %    Atypical Lymphocytes % 1 (H) <=0 %    Absolute Neutrophils 9.50 (H) 1.85 - 7.62 Thousand/uL    Lymphocytes Absolute 3.17 0.60 - 4.47 Thousand/uL    Monocytes Absolute 0.86 0.00 - 1.22 Thousand/uL    Eosinophils Absolute 0.00 0.00 - 0.40 Thousand/uL    Basophils Absolute 0.00 0.00 - 0.10 Thousand/uL    Total Counted RBC Morphology Present     Anisocytosis Present     Platelet Estimate Adequate Adequate    Large Platelet Present    CBC and differential    Collection Time: 05/04/23  9:06 AM   Result Value Ref Range    WBC 8.73 4.31 - 10.16 Thousand/uL    RBC 3.24 (L) 3.88 - 5.62 Million/uL    Hemoglobin 10.8 (L) 12.0 - 17.0 g/dL    Hematocrit 31.4 (L) 36.5 - 49.3 %    MCV 97 82 - 98 fL    MCH 33.3 26.8 - 34.3 pg    MCHC 34.4 31.4 - 37.4 g/dL    RDW 15.6 (H) 11.6 - 15.1 %    MPV 8.9 8.9 - 12.7 fL    Platelets 296 293 - 870 Thousands/uL    nRBC 0 /100 WBCs    Neutrophils Relative 80 (H) 43 - 75 %    Immat GRANS % 1 0 - 2 %    Lymphocytes Relative 16 14 - 44 %    Monocytes Relative 3 (L) 4 - 12 %    Eosinophils Relative 0 0 - 6 %    Basophils Relative 0 0 - 1 %    Neutrophils Absolute 6.99 1.85 - 7.62 Thousands/µL    Immature Grans Absolute 0.04 0.00 - 0.20 Thousand/uL    Lymphocytes Absolute 1.41 0.60 - 4.47 Thousands/µL    Monocytes Absolute 0.25 0.17 - 1.22 Thousand/µL    Eosinophils Absolute 0.03 0.00 - 0.61 Thousand/µL    Basophils Absolute 0.01 0.00 - 0.10 Thousands/µL   Comprehensive metabolic panel    Collection Time: 05/04/23  9:06 AM   Result Value Ref Range    Sodium 137 135 - 147 mmol/L    Potassium 3.9 3.5 - 5.3 mmol/L    Chloride 101 96 - 108 mmol/L    CO2 27 21 - 32 mmol/L    ANION GAP 9 4 - 13 mmol/L    BUN 14 5 - 25 mg/dL    Creatinine 0.71 0.60 - 1.30 mg/dL    Glucose 82 65 - 140 mg/dL    Calcium 9.0 8.4 - 10.2 mg/dL    AST 23 13 - 39 U/L    ALT 69 (H) 7 - 52 U/L    Alkaline Phosphatase 102 34 - 104 U/L    Total Protein 6.6 6.4 - 8.4 g/dL    Albumin 3.8 3.5 - 5.0 g/dL    Total Bilirubin 0.71 0.20 - 1.00 mg/dL    eGFR 120 ml/min/1.73sq m   Magnesium    Collection Time: 05/04/23  9:06 AM   Result Value Ref Range    Magnesium 2.2 1.9 - 2.7 mg/dL   B-Type Natriuretic Peptide(BNP)    Collection Time: 05/04/23  9:06 AM   Result Value Ref Range     (H) 0 - 100 pg/mL   ECG 12 lead    Collection Time: 05/04/23 9:06 AM   Result Value Ref Range    Ventricular Rate 61 BPM    Atrial Rate 61 BPM    MD Interval 166 ms    QRSD Interval 86 ms    QT Interval 400 ms    QTC Interval 402 ms    P Axis 41 degrees    QRS Axis 33 degrees    T Wave Axis 43 degrees   UA w Reflex to Microscopic w Reflex to Culture    Collection Time: 05/04/23  9:10 AM    Specimen: Urine, Other   Result Value Ref Range    Color, UA Debbie (A) Straw, Yellow, Pale Yellow    Clarity, UA Clear Clear, Other    Specific Gravity, UA 1.015 1.003 - 1.040    pH, UA 7.0 4.5, 5.0, 5.5, 6.0, 6.5, 7.0, 7.5, 8.0    Leukocytes, UA Negative Negative    Nitrite, UA Negative Negative    Protein, UA Negative Negative mg/dl    Glucose, UA Negative Negative mg/dl    Ketones, UA Negative Negative mg/dl    Bilirubin, UA Negative Negative    Occult Blood, UA Negative Negative    UROBILINOGEN UA 1.0 1.0, Negative mg/dL   CBC and differential    Collection Time: 05/22/23  8:22 AM   Result Value Ref Range    WBC 11.54 (H) 4.31 - 10.16 Thousand/uL    RBC 3.71 (L) 3.88 - 5.62 Million/uL    Hemoglobin 12.2 12.0 - 17.0 g/dL    Hematocrit 36.8 36.5 - 49.3 %    MCV 99 (H) 82 - 98 fL    MCH 32.9 26.8 - 34.3 pg    MCHC 33.2 31.4 - 37.4 g/dL    RDW 17.3 (H) 11.6 - 15.1 %    MPV 9.3 8.9 - 12.7 fL    Platelets 034 887 - 408 Thousands/uL    nRBC 0 /100 WBCs    Neutrophils Relative 71 43 - 75 %    Immat GRANS % 2 0 - 2 %    Lymphocytes Relative 17 14 - 44 %    Monocytes Relative 9 4 - 12 %    Eosinophils Relative 0 0 - 6 %    Basophils Relative 1 0 - 1 %    Neutrophils Absolute 8.24 (H) 1.85 - 7.62 Thousands/µL    Immature Grans Absolute 0.19 0.00 - 0.20 Thousand/uL    Lymphocytes Absolute 1.92 0.60 - 4.47 Thousands/µL    Monocytes Absolute 1.08 0.17 - 1.22 Thousand/µL    Eosinophils Absolute 0.05 0.00 - 0.61 Thousand/µL    Basophils Absolute 0.06 0.00 - 0.10 Thousands/µL   Comprehensive metabolic panel    Collection Time: 05/22/23  8:22 AM   Result Value Ref Range    Sodium 137 135 - 147 mmol/L Potassium 3.4 (L) 3.5 - 5.3 mmol/L    Chloride 97 96 - 108 mmol/L    CO2 35 (H) 21 - 32 mmol/L    ANION GAP 5 4 - 13 mmol/L    BUN 16 5 - 25 mg/dL    Creatinine 0.84 0.60 - 1.30 mg/dL    Glucose 84 65 - 140 mg/dL    Calcium 9.4 8.4 - 10.2 mg/dL    AST 26 13 - 39 U/L    ALT 68 (H) 7 - 52 U/L    Alkaline Phosphatase 128 (H) 34 - 104 U/L    Total Protein 7.1 6.4 - 8.4 g/dL    Albumin 4.1 3.5 - 5.0 g/dL    Total Bilirubin 0.49 0.20 - 1.00 mg/dL    eGFR 112 ml/min/1.73sq m   Magnesium    Collection Time: 05/22/23  8:22 AM   Result Value Ref Range    Magnesium 2.2 1.9 - 2.7 mg/dL   Echo complete w/ contrast if indicated    Collection Time: 06/02/23  2:18 PM   Result Value Ref Range    A4C EF 70 %    LV Diastolic Volume (BP) 179 mL    LV Systolic Volume (BP) 43 mL    EF 59 %    LVIDd 3.60 cm    LVIDS 2.50 cm    IVSd 1.30 cm    LVPWd 1.30 cm    FS 31 28 - 44    RVID d 4.0 cm    Tricuspid annular plane systolic excursion 4.42 cm    LA size 3.6 cm    LA length (A2C) 4.20 cm    JUSTINO A4C 13.9 cm2    RAA A4C 11.6 cm2    TR Peak Tono 3.1 m/s    Triscuspid Valve Regurgitation Peak Gradient 37.0 mmHg    Ao root 3.20 cm    Asc Ao 3.6 cm    Tricuspid valve peak regurgitation velocity 3.05 m/s    Left ventricular stroke volume (2D) 31.00 mL    IVS 1.3 cm    LEFT VENTRICLE SYSTOLIC VOLUME (MOD BIPLANE) 2D 23 mL    LV DIASTOLIC VOLUME (MOD BIPLANE) 2D 54 mL    Left Atrium Area-systolic Four Chamber 91.0 cm2    Left Atrium Area-systolic Apical Two Chamber 14.4 cm2    LVSV, 2D 31 mL    LV EF 65    Hepatitis C Antibody    Collection Time: 06/19/23 12:30 PM   Result Value Ref Range    Hepatitis C Ab Non-reactive Non-Reactive   B-Type Natriuretic Peptide(BNP)    Collection Time: 06/19/23 12:30 PM   Result Value Ref Range    BNP 15 0 - 100 pg/mL   Lyme Total Antibody Profile with reflex to WB    Collection Time: 06/19/23 12:30 PM   Result Value Ref Range    Lyme Total Antibodies 2.7 (H) 0.2 - 8.0 AI   HIV 1/2 AG/AB w Reflex [facility] for 2 yr old and above    Collection Time: 06/19/23 12:30 PM   Result Value Ref Range    HIV-1 p24 Antigen Non-Reactive Non-Reactive    HIV-1 Antibody Non-Reactive Non-Reactive    HIV-2 Antibody Non-Reactive Non-Reactive    HIV Ag-Ab 5th Gen Non-Reactive Non-Reactive   Lyme Western Blot, Serum    Collection Time: 06/19/23 12:30 PM   Result Value Ref Range    Lyme 18 kD IgG Absent     Lyme 23 kD IgG Absent     Lyme 28 kD IgG Absent     Lyme 30 kD IgG Absent     Lyme 39 kD IgG Absent     Lyme 41 kD IgG Absent     Lyme 45 kD IgG Absent     Lyme 58 kD IgG Absent     Lyme 66 kD IgG Absent     Lyme 93 kD IgG Absent     Lyme 23 kD IgM Present (A)     Lyme 39 kD IgM Absent     Lyme 41 kD IgM Absent     Lyme IgG WB Interp. Negative     Lyme IgM WB Interp.  Negative    POCT Blood Gas (CG8+)    Collection Time: 07/28/23  6:20 PM   Result Value Ref Range    ph, Rasheed ISTAT 7.402 (H) 7.300 - 7.400    pCO2, Rasheed i-STAT 63.7 (H) 42.0 - 50.0 mm HG    pO2, Rasheed i-STAT 62.0 (H) 35.0 - 45.0 mm HG    BE, i-STAT 12 (H) -2 - 3 mmol/L    HCO3, Rasheed i-STAT 39.6 (HH) 24.0 - 30.0 mmol/L    CO2, i-STAT 42 (H) 21 - 32 mmol/L    O2 Sat, i-STAT 90 (H) 60 - 85 %    SODIUM, I-STAT 129 (L) 136 - 145 mmol/l    Potassium, i-STAT 2.8 (L) 3.5 - 5.3 mmol/L    Calcium, Ionized i-STAT 1.19 1.12 - 1.32 mmol/L    Hct, i-STAT 42 36.5 - 49.3 %    Hgb, i-STAT 14.3 12.0 - 17.0 g/dl    Glucose, i-STAT 93 65 - 140 mg/dl    Specimen Type VENOUS    CBC and differential    Collection Time: 07/28/23  6:55 PM   Result Value Ref Range    WBC 5.17 4.31 - 10.16 Thousand/uL    RBC 4.24 3.88 - 5.62 Million/uL    Hemoglobin 13.7 12.0 - 17.0 g/dL    Hematocrit 39.8 36.5 - 49.3 %    MCV 94 82 - 98 fL    MCH 32.3 26.8 - 34.3 pg    MCHC 34.4 31.4 - 37.4 g/dL    RDW 13.3 11.6 - 15.1 %    MPV 9.0 8.9 - 12.7 fL    Platelets 779 905 - 278 Thousands/uL    nRBC 0 /100 WBCs    Neutrophils Relative 74 43 - 75 %    Immat GRANS % 1 0 - 2 %    Lymphocytes Relative 16 14 - 44 %    Monocytes Relative 8 4 - 12 %    Eosinophils Relative 1 0 - 6 %    Basophils Relative 0 0 - 1 %    Neutrophils Absolute 3.87 1.85 - 7.62 Thousands/µL    Immature Grans Absolute 0.05 0.00 - 0.20 Thousand/uL    Lymphocytes Absolute 0.81 0.60 - 4.47 Thousands/µL    Monocytes Absolute 0.39 0.17 - 1.22 Thousand/µL    Eosinophils Absolute 0.03 0.00 - 0.61 Thousand/µL    Basophils Absolute 0.02 0.00 - 0.10 Thousands/µL   Basic metabolic panel    Collection Time: 07/28/23  6:55 PM   Result Value Ref Range    Sodium 129 (L) 135 - 147 mmol/L    Potassium 2.8 (L) 3.5 - 5.3 mmol/L    Chloride 85 (L) 96 - 108 mmol/L    CO2 36 (H) 21 - 32 mmol/L    ANION GAP 8 mmol/L    BUN 12 5 - 25 mg/dL    Creatinine 0.74 0.60 - 1.30 mg/dL    Glucose 86 65 - 140 mg/dL    Calcium 9.3 8.4 - 10.2 mg/dL    eGFR 118 ml/min/1.73sq m   TSH, 3rd generation with Free T4 reflex    Collection Time: 07/28/23  6:55 PM   Result Value Ref Range    TSH 3RD GENERATON 1.318 0.450 - 4.500 uIU/mL   B-Type Natriuretic Peptide(BNP)    Collection Time: 07/28/23  6:55 PM   Result Value Ref Range    BNP 10 0 - 100 pg/mL   Type and screen    Collection Time: 07/28/23  8:01 PM   Result Value Ref Range    ABO Grouping A     Rh Factor Positive     Antibody Screen Negative     Specimen Expiration Date [id number]    Rapid drug screen, urine    Collection Time: 07/28/23  8:53 PM   Result Value Ref Range    Amph/Meth UR Negative Negative    Barbiturate Ur Negative Negative    Benzodiazepine Urine Negative Negative    Cocaine Urine Negative Negative    Methadone Urine Negative Negative    Opiate Urine Negative Negative    PCP Ur Negative Negative    THC Urine Positive (A) Negative    Oxycodone Urine Negative Negative   Ethanol    Collection Time: 07/28/23  9:01 PM   Result Value Ref Range    Ethanol Lvl <10 <10 mg/dL   Magnesium    Collection Time: 07/28/23  9:01 PM   Result Value Ref Range    Magnesium 1.8 (L) 1.9 - 2.7 mg/dL   Procalcitonin    Collection Time: 07/28/23  9:01 PM   Result Value Ref Range Procalcitonin 0.09 <=0.25 ng/ml   Lactic acid, plasma (w/reflex if result > 2.0)    Collection Time: 07/28/23 11:14 PM   Result Value Ref Range    LACTIC ACID 2.1 (HH) 0.5 - 2.0 mmol/L   Comprehensive metabolic panel    Collection Time: 07/28/23 11:14 PM   Result Value Ref Range    Sodium 131 (L) 135 - 147 mmol/L    Potassium 3.1 (L) 3.5 - 5.3 mmol/L    Chloride 87 (L) 96 - 108 mmol/L    CO2 34 (H) 21 - 32 mmol/L    ANION GAP 10 mmol/L    BUN 10 5 - 25 mg/dL    Creatinine 0.70 0.60 - 1.30 mg/dL    Glucose 81 65 - 140 mg/dL    Calcium 8.8 8.4 - 10.2 mg/dL    AST 75 (H) 13 - 39 U/L     (H) 7 - 52 U/L    Alkaline Phosphatase 119 (H) 34 - 104 U/L    Total Protein 6.1 (L) 6.4 - 8.4 g/dL    Albumin 3.5 3.5 - 5.0 g/dL    Total Bilirubin 0.37 0.20 - 1.00 mg/dL    eGFR 121 ml/min/1.73sq m   UA w Reflex to Microscopic w Reflex to Culture    Collection Time: 07/28/23 11:36 PM    Specimen: Urine, Indwelling Sheppard Catheter   Result Value Ref Range    Color, UA Light Yellow     Clarity, UA Turbid     Specific Gravity, UA 1.016 1.003 - 1.030    pH, UA 5.5 4.5, 5.0, 5.5, 6.0, 6.5, 7.0, 7.5, 8.0    Leukocytes, UA Negative Negative    Nitrite, UA Negative Negative    Protein, UA Negative Negative mg/dl    Glucose, UA Negative Negative mg/dl    Ketones, UA Negative Negative mg/dl    Urobilinogen, UA <2.0 <2.0 mg/dl mg/dl    Bilirubin, UA Negative Negative    Occult Blood, UA Negative Negative   Blood culture    Collection Time: 07/29/23 12:47 AM    Specimen: Arm, Left; Blood   Result Value Ref Range    Blood Culture No Growth After 5 Days. Blood culture    Collection Time: 07/29/23 12:47 AM    Specimen: Arm, Right; Blood   Result Value Ref Range    Blood Culture No Growth After 5 Days.     COVID only    Collection Time: 07/29/23 12:49 AM    Specimen: Nose; Nares   Result Value Ref Range    SARS-CoV-2 Negative Negative   Lactic acid 2 Hours    Collection Time: 07/29/23  1:55 AM   Result Value Ref Range    LACTIC ACID 2.0 0.5 - 2.0 mmol/L   CBC (With Platelets)    Collection Time: 07/29/23  4:33 AM   Result Value Ref Range    WBC 5.78 4.31 - 10.16 Thousand/uL    RBC 3.81 (L) 3.88 - 5.62 Million/uL    Hemoglobin 12.2 12.0 - 17.0 g/dL    Hematocrit 36.1 (L) 36.5 - 49.3 %    MCV 95 82 - 98 fL    MCH 32.0 26.8 - 34.3 pg    MCHC 33.8 31.4 - 37.4 g/dL    RDW 13.8 11.6 - 15.1 %    Platelets 238 040 - 676 Thousands/uL    MPV 9.0 8.9 - 12.7 fL   Comprehensive metabolic panel    Collection Time: 07/29/23  4:33 AM   Result Value Ref Range    Sodium 131 (L) 135 - 147 mmol/L    Potassium 3.2 (L) 3.5 - 5.3 mmol/L    Chloride 87 (L) 96 - 108 mmol/L    CO2 35 (H) 21 - 32 mmol/L    ANION GAP 9 mmol/L    BUN 10 5 - 25 mg/dL    Creatinine 0.78 0.60 - 1.30 mg/dL    Glucose 91 65 - 140 mg/dL    Calcium 9.0 8.4 - 10.2 mg/dL    AST 77 (H) 13 - 39 U/L     (H) 7 - 52 U/L    Alkaline Phosphatase 118 (H) 34 - 104 U/L    Total Protein 6.4 6.4 - 8.4 g/dL    Albumin 3.9 3.5 - 5.0 g/dL    Total Bilirubin 0.46 0.20 - 1.00 mg/dL    eGFR 116 ml/min/1.73sq m   Basic metabolic panel    Collection Time: 07/29/23  2:27 PM   Result Value Ref Range    Sodium 129 (L) 135 - 147 mmol/L    Potassium 3.6 3.5 - 5.3 mmol/L    Chloride 88 (L) 96 - 108 mmol/L    CO2 35 (H) 21 - 32 mmol/L    ANION GAP 6 mmol/L    BUN 11 5 - 25 mg/dL    Creatinine 0.83 0.60 - 1.30 mg/dL    Glucose 78 65 - 140 mg/dL    Calcium 8.9 8.4 - 10.2 mg/dL    eGFR 113 ml/min/1.73sq m   Basic metabolic panel    Collection Time: 07/30/23  5:17 AM   Result Value Ref Range    Sodium 131 (L) 135 - 147 mmol/L    Potassium 3.3 (L) 3.5 - 5.3 mmol/L    Chloride 88 (L) 96 - 108 mmol/L    CO2 34 (H) 21 - 32 mmol/L    ANION GAP 9 mmol/L    BUN 10 5 - 25 mg/dL    Creatinine 0.80 0.60 - 1.30 mg/dL    Glucose 86 65 - 140 mg/dL    Calcium 9.1 8.4 - 10.2 mg/dL    eGFR 114 ml/min/1.73sq m   CBC    Collection Time: 07/30/23  5:17 AM   Result Value Ref Range    WBC 6.96 4.31 - 10.16 Thousand/uL    RBC 3.76 (L) 3.88 - 5.62 Million/uL    Hemoglobin 12.1 12.0 - 17.0 g/dL    Hematocrit 37.0 36.5 - 49.3 %    MCV 98 82 - 98 fL    MCH 32.2 26.8 - 34.3 pg    MCHC 32.7 31.4 - 37.4 g/dL    RDW 14.4 11.6 - 15.1 %    Platelets 549 376 - 604 Thousands/uL    MPV 9.3 8.9 - 12.7 fL   Magnesium    Collection Time: 07/30/23  5:17 AM   Result Value Ref Range    Magnesium 1.9 1.9 - 2.7 mg/dL   CBC    Collection Time: 07/31/23  4:32 AM   Result Value Ref Range    WBC 8.52 4.31 - 10.16 Thousand/uL    RBC 3.89 3.88 - 5.62 Million/uL    Hemoglobin 12.6 12.0 - 17.0 g/dL    Hematocrit 38.0 36.5 - 49.3 %    MCV 98 82 - 98 fL    MCH 32.4 26.8 - 34.3 pg    MCHC 33.2 31.4 - 37.4 g/dL    RDW 14.1 11.6 - 15.1 %    Platelets 219 344 - 421 Thousands/uL    MPV 9.6 8.9 - 12.7 fL   Basic metabolic panel    Collection Time: 07/31/23  4:32 AM   Result Value Ref Range    Sodium 131 (L) 135 - 147 mmol/L    Potassium 3.4 (L) 3.5 - 5.3 mmol/L    Chloride 88 (L) 96 - 108 mmol/L    CO2 33 (H) 21 - 32 mmol/L    ANION GAP 10 mmol/L    BUN 11 5 - 25 mg/dL    Creatinine 0.74 0.60 - 1.30 mg/dL    Glucose 100 65 - 140 mg/dL    Calcium 9.7 8.4 - 10.2 mg/dL    eGFR 118 ml/min/1.73sq m   Basic metabolic panel    Collection Time: 08/01/23  4:42 AM   Result Value Ref Range    Sodium 134 (L) 135 - 147 mmol/L    Potassium 3.6 3.5 - 5.3 mmol/L    Chloride 90 (L) 96 - 108 mmol/L    CO2 38 (H) 21 - 32 mmol/L    ANION GAP 6 mmol/L    BUN 12 5 - 25 mg/dL    Creatinine 0.69 0.60 - 1.30 mg/dL    Glucose 95 65 - 140 mg/dL    Calcium 9.7 8.4 - 10.2 mg/dL    eGFR 122 ml/min/1.73sq m   Basic metabolic panel    Collection Time: 08/02/23  5:55 AM   Result Value Ref Range    Sodium 133 (L) 135 - 147 mmol/L    Potassium 3.5 3.5 - 5.3 mmol/L    Chloride 88 (L) 96 - 108 mmol/L    CO2 37 (H) 21 - 32 mmol/L    ANION GAP 8 mmol/L    BUN 13 5 - 25 mg/dL    Creatinine 0.79 0.60 - 1.30 mg/dL    Glucose 89 65 - 140 mg/dL    Calcium 9.4 8.4 - 10.2 mg/dL    eGFR 115 ml/min/1.73sq m   Basic metabolic panel Collection Time: 08/03/23  4:32 AM   Result Value Ref Range    Sodium 134 (L) 135 - 147 mmol/L    Potassium 3.4 (L) 3.5 - 5.3 mmol/L    Chloride 88 (L) 96 - 108 mmol/L    CO2 38 (H) 21 - 32 mmol/L    ANION GAP 8 mmol/L    BUN 13 5 - 25 mg/dL    Creatinine 0.85 0.60 - 1.30 mg/dL    Glucose 94 65 - 140 mg/dL    Calcium 9.6 8.4 - 10.2 mg/dL    eGFR 112 ml/min/1.73sq m   Basic metabolic panel    Collection Time: 08/04/23  4:45 AM   Result Value Ref Range    Sodium 133 (L) 135 - 147 mmol/L    Potassium 3.5 3.5 - 5.3 mmol/L    Chloride 88 (L) 96 - 108 mmol/L    CO2 37 (H) 21 - 32 mmol/L    ANION GAP 8 mmol/L    BUN 14 5 - 25 mg/dL    Creatinine 0.83 0.60 - 1.30 mg/dL    Glucose 93 65 - 140 mg/dL    Calcium 9.2 8.4 - 10.2 mg/dL    eGFR 113 ml/min/1.73sq m   Basic metabolic panel    Collection Time: 08/05/23  4:56 AM   Result Value Ref Range    Sodium 129 (L) 135 - 147 mmol/L    Potassium 3.5 3.5 - 5.3 mmol/L    Chloride 87 (L) 96 - 108 mmol/L    CO2 35 (H) 21 - 32 mmol/L    ANION GAP 7 mmol/L    BUN 13 5 - 25 mg/dL    Creatinine 0.77 0.60 - 1.30 mg/dL    Glucose 89 65 - 140 mg/dL    Calcium 9.0 8.4 - 10.2 mg/dL    eGFR 116 ml/min/1.73sq m   Fingerstick Glucose (POCT)    Collection Time: 08/11/23  4:26 PM   Result Value Ref Range    POC Glucose 89 65 - 140 mg/dl   ECG 12 lead    Collection Time: 08/11/23  4:27 PM   Result Value Ref Range    Ventricular Rate 66 BPM    Atrial Rate 66 BPM    WA Interval 220 ms    QRSD Interval 92 ms    QT Interval 426 ms    QTC Interval 446 ms    P Axis 60 degrees    QRS Axis 68 degrees    T Wave Axis 67 degrees   CBC and differential    Collection Time: 08/11/23  4:56 PM   Result Value Ref Range    WBC 9.05 4.31 - 10.16 Thousand/uL    RBC 3.92 3.88 - 5.62 Million/uL    Hemoglobin 12.7 12.0 - 17.0 g/dL    Hematocrit 37.9 36.5 - 49.3 %    MCV 97 82 - 98 fL    MCH 32.4 26.8 - 34.3 pg    MCHC 33.5 31.4 - 37.4 g/dL    RDW 14.5 11.6 - 15.1 %    MPV 10.6 8.9 - 12.7 fL    Platelets 145 022 - 174 Thousands/uL    nRBC 0 /100 WBCs    Neutrophils Relative 72 43 - 75 %    Immat GRANS % 1 0 - 2 %    Lymphocytes Relative 17 14 - 44 %    Monocytes Relative 9 4 - 12 %    Eosinophils Relative 1 0 - 6 %    Basophils Relative 0 0 - 1 %    Neutrophils Absolute 6.50 1.85 - 7.62 Thousands/µL    Immature Grans Absolute 0.05 0.00 - 0.20 Thousand/uL    Lymphocytes Absolute 1.53 0.60 - 4.47 Thousands/µL    Monocytes Absolute 0.84 0.17 - 1.22 Thousand/µL    Eosinophils Absolute 0.09 0.00 - 0.61 Thousand/µL    Basophils Absolute 0.04 0.00 - 0.10 Thousands/µL   Comprehensive metabolic panel    Collection Time: 08/11/23  4:56 PM   Result Value Ref Range    Sodium 129 (L) 135 - 147 mmol/L    Potassium 3.6 3.5 - 5.3 mmol/L    Chloride 90 (L) 96 - 108 mmol/L    CO2 32 21 - 32 mmol/L    ANION GAP 7 mmol/L    BUN 8 5 - 25 mg/dL    Creatinine 0.84 0.60 - 1.30 mg/dL    Glucose 88 65 - 140 mg/dL    Calcium 9.3 8.3 - 10.1 mg/dL    Corrected Calcium 9.8 8.3 - 10.1 mg/dL    AST 77 (H) 5 - 45 U/L     (H) 12 - 78 U/L    Alkaline Phosphatase 133 (H) 46 - 116 U/L    Total Protein 7.5 6.4 - 8.4 g/dL    Albumin 3.4 (L) 3.5 - 5.0 g/dL    Total Bilirubin 0.53 0.20 - 1.00 mg/dL    eGFR 112 ml/min/1.73sq m   Lactic acid, plasma (w/reflex if result > 2.0)    Collection Time: 08/11/23  4:56 PM   Result Value Ref Range    LACTIC ACID 1.1 0.5 - 2.0 mmol/L   TSH    Collection Time: 08/11/23  4:56 PM   Result Value Ref Range    TSH 3RD GENERATON 2.421 0.450 - 4.500 uIU/mL   Blood gas, venous    Collection Time: 08/11/23  4:56 PM   Result Value Ref Range    pH, Rasheed 7.354 7.300 - 7.400    pCO2, Rasheed 58.3 (H) 42.0 - 50.0 mm Hg    pO2, Rasheed 55.8 (H) 35.0 - 45.0 mm Hg    HCO3, Rasheed 31.8 (H) 24 - 30 mmol/L    Base Excess, Rasheed 4.6 mmol/L    O2 Content, Rasheed 15.5 ml/dL    O2 HGB, VENOUS 81.5 (H) 60.0 - 80.0 %   Ammonia    Collection Time: 08/11/23  4:56 PM   Result Value Ref Range    Ammonia 21 11 - 35 umol/L   Ethanol    Collection Time: 08/11/23  4:56 PM Result Value Ref Range    Ethanol Lvl <3 0 - 3 mg/dL   Procalcitonin    Collection Time: 08/11/23  4:56 PM   Result Value Ref Range    Procalcitonin 0.11 <=0.25 ng/ml   UA w Reflex to Microscopic w Reflex to Culture    Collection Time: 08/11/23  8:20 PM    Specimen: Urine, Other   Result Value Ref Range    Color, UA Colorless     Clarity, UA Clear     Specific Gravity, UA 1.017 1.003 - 1.030    pH, UA 6.5 4.5, 5.0, 5.5, 6.0, 6.5, 7.0, 7.5, 8.0    Leukocytes, UA Negative Negative    Nitrite, UA Negative Negative    Protein, UA Negative Negative mg/dl    Glucose, UA Negative Negative mg/dl    Ketones, UA Negative Negative mg/dl    Urobilinogen, UA <2.0 <2.0 mg/dl mg/dl    Bilirubin, UA Negative Negative    Occult Blood, UA Negative Negative   Lyme Total AB W Reflex to IGM/IGG    Collection Time: 08/11/23  8:20 PM   Result Value Ref Range    Lyme Total Antibodies Negative Negative   Glucose CSF    Collection Time: 08/11/23 10:34 PM   Result Value Ref Range    Glucose, CSF 59 50 - 80 mg/dL   Protein CSF    Collection Time: 08/11/23 10:34 PM   Result Value Ref Range    Protein, CSF 46 (H) 15 - 45 mg/dL   CSF white cell count with differential    Collection Time: 08/11/23 10:34 PM   Result Value Ref Range    Appearance, CSF light pink     Tube Number, CSF 4     WBC, CSF 9 (HH) 0 - 5 /uL    Xanthochromia No No   RBC count,CSF    Collection Time: 08/11/23 10:34 PM   Result Value Ref Range    RBC, CSF 2,500 (H) 0 - 10 uL   Gram stain CSF    Collection Time: 08/11/23 10:34 PM    Specimen: Lumbar Puncture; Cerebrospinal Fluid   Result Value Ref Range    Gram Stain Result Rare Polys     Gram Stain Result Rare Mononuclear Cells     Gram Stain Result No bacteria seen    Spinal fluid culture and Gram stain    Collection Time: 08/11/23 10:34 PM    Specimen: Lumbar Puncture; Cerebrospinal Fluid   Result Value Ref Range    CSF Culture No growth    Meningitis/Encephalitis (ME) Panel    Collection Time: 08/11/23 10:34 PM   Result Value Ref Range    C.NEOFORMANS/GATTII Not Detected Not Detected    CYTOMEGALOVIRUS Not Detected Not Detected    ENTEROVIRUS Not Detected Not Detected    E.COLI K1 Not Detected Not Detected    H.INFLUENZAE Not Detected Not Detected    H.SIMPLEX 1 Not Detected Not Detected    H.SIMPLEX 2 Not Detected Not Detected    HERPES VIRUS 6 Not Detected Not Detected    PARECHOVIRUS Not Detected Not Detected    L. MONOCYTOGENES Not Detected Not Detected    N.MENINGITIDIS Not Detected Not Detected    S.AGALACTIAE Not Detected Not Detected    S. PNEUMONIAE Not Detected Not Detected    V.ZOSTER Not Detected Not Detected   LYME TITER IGG/IGM, CSF    Collection Time: 08/11/23 10:34 PM   Result Value Ref Range    B.Burgdorferi IGG CSF < 0.08 Index    B.Burgdorferi IGM CSF < 0.06 Index   Cryptococcal antigen, CSF    Collection Time: 08/11/23 10:34 PM   Result Value Ref Range    Crypto Ag, CSF Negative Negative   CSF Diff    Collection Time: 08/11/23 10:34 PM   Result Value Ref Range    Total Counted 100     Neutrophils % (CSF) 17 %    Lymphs % CSF 78 %    Monocytes % (CSF) 5 %   Lyme disease, PCR    Collection Time: 08/11/23 10:40 PM   Result Value Ref Range    Lyme Disease(B.burgdorferi)PCR Negative Negative   Fungal culture    Collection Time: 08/11/23 10:40 PM    Specimen: Lumbar Puncture; Cerebrospinal Fluid   Result Value Ref Range    Fungus (Mycology) Culture No Fungus Isolated at 4 Weeks    Toxicology screen, urine    Collection Time: 08/12/23  3:16 AM   Result Value Ref Range    Amphetamine Screen, Ur Negative Eccfhz=7992 ng/mL    Barbiturate Screen, Ur Negative Dmjozt=915 ng/mL    Cannabinoid Scrn, Ur Positive (A) Cutoff=50    Methadone Screen, Urine Negative Tptxxk=127 ng/mL    Opiate Scrn, Ur Negative Jszkps=214 ng/mL    Phencyclidine (PCP), Qual, Ur Negative Cutoff=25 ng/mL    Benzodiazepines Negative Qipwlq=883 ng/mL    Cocaine (Metab.) Urine Negative Nbcydg=096 ng/mL    Propoxyphene Screen, Urine Negative Gkhggc=950 ng/mL UA (URINE) with reflex to Scope    Collection Time: 08/12/23  3:16 AM   Result Value Ref Range    Color, UA Light Yellow     Clarity, UA Clear     Specific Gravity, UA 1.044 (H) 1.003 - 1.030    pH, UA 5.5 4.5, 5.0, 5.5, 6.0, 6.5, 7.0, 7.5, 8.0    Leukocytes, UA Negative Negative    Nitrite, UA Negative Negative    Protein, UA Trace (A) Negative mg/dl    Glucose, UA Negative Negative mg/dl    Ketones, UA Negative Negative mg/dl    Urobilinogen, UA <2.0 <2.0 mg/dl mg/dl    Bilirubin, UA Negative Negative    Occult Blood, UA Small (A) Negative     *Note: Due to a large number of results and/or encounters for the requested time period, some results have not been displayed. A complete set of results can be found in Results Review. Laboratory Results: I have personally reviewed the pertinent laboratory results/reports     Radiology/Other Diagnostic Testing Results: I have personally reviewed pertinent reports. IR gastrostomy tube placement    Result Date: 9/25/2023  PROCEDURE: Fluoroscopic-guided percutaneous gastrostomy catheter placement STAFF: Brian Whitney M.D. CONTRAST: 40 mL Omnipaque intragastric. FLUOROSCOPY TIME: 6.2 minutes. NUMBER OF IMAGES: Multiple. COMPLICATIONS: None. MEDICATIONS: Local lidocaine. Glucagon 1 mg iv. Sedation provided by anesthesia team, please refer to their note for details INDICATION: Feeding tube dependent. Recently placed surgical G-tube completely dislodged. PROCEDURE: After gastric insufflation, 3 point gastropexy was performed. A stiff guide wire was placed into the stomach in between the gastropexies and the tract was dilated with a 9 mm conquest balloon. An 25 [place] FALGUNI gastrostomy tube was placed with its tip in the stomach. The balloon was inflated with 7 mLs of a very dilute mixture of contrast and saline. Post placement injection demonstrated good position. FINDINGS: 1.   Initial fluoroscopy confirmed intragastric pooling of contrast. 2.  Final fluoroscopic image showed the catheter to be in good position. Percutaneous gastrostomy catheter placement. PLAN: The tube will be placed to gravity drainage overnight and after evaluation tomorrow, feedings may begin. Gastropexy anchors should disconnect from the site within 10-14 days. The catheter should be exchanged every 6 months. Workstation performed: [id number]     XR abdomen 1 view kub    Result Date: 9/25/2023  ABDOMEN INDICATION:   tube placement. HISTORY: Evaluate PEG-tube placement. COMPARISON: 8/25/2023. VIEWS: AP supine IMAGES: 2 FINDINGS: Markedly technically limited examination due to suboptimal patient positioning. Contrast was administered through indwelling percutaneous gastrostomy tube. Contrast material conforms to gastric contour without discernible evidence for leak. However, on subsequent abdominal CT, the percutaneous gastrostomy tube is confirmed to be dislodged and terminates in the abdominal wall. No dilated small bowel loops are identified. Impression: Markedly technically limited examination due to suboptimal patient positioning. Although there is no evidence of contrast leak on this limited study, the percutaneous gastrostomy tube is confirmed to be dislodged and terminates in the abdominal wall on subsequently performed abdominal CT. Workstation performed: [id number]     CT abdomen pelvis wo contrast    Result Date: 9/24/2023  CT ABDOMEN AND PELVIS WITHOUT IV CONTRAST INDICATION:   PEG tube leaking. COMPARISON: August 11, 2023 TECHNIQUE:  CT examination of the abdomen and pelvis was performed without intravenous contrast. Multiplanar 2D reformatted images were created from the source data. This examination, like all CT scans performed in the [facility], was performed utilizing techniques to minimize radiation dose exposure, including the use of iterative reconstruction and automated exposure control.  Radiation dose length product (DLP) for this visit:  1917 mGy-cm Enteric contrast was administered. FINDINGS: ABDOMEN LOWER CHEST: There are persistent probably slightly increased areas of consolidation in both lower lobes and also now in the middle lobe on the right. This could be atelectasis or pneumonia. Correlate for fever or cough. Some of the patient's central line is visible extending into the right atrium. Heart size within normal limits. No pericardial or pleural effusion. There is gynecomastia. LIVER/BILIARY TREE:  Unremarkable. GALLBLADDER:  No calcified gallstones. No pericholecystic inflammatory change. SPLEEN:  Unremarkable. PANCREAS:  Unremarkable. ADRENAL GLANDS:  Unremarkable. KIDNEYS/URETERS:  Unremarkable. No hydronephrosis. STOMACH AND BOWEL: The PEG tube is dislodged and no longer within the stomach. It terminates within the abdominal wall and the balloon still appears inflated. There is some haziness within the subcutaneous fat and muscular layer surrounding the site of the tube. The stomach itself appears within normal limits other than slight postprocedural change anteriorly where the PEG tube had entered the stomach. There is no bowel obstruction. There is enteric contrast within small bowel and some of the colon. APPENDIX:  No findings to suggest appendicitis. ABDOMINOPELVIC CAVITY: No ascites or free air. There are somewhat dense partially calcified appearing nodules to the left of the aorta and the retroperitoneum which are probably partially calcified lymph nodes. These are stable from the prior. VESSELS:  Unremarkable for patient's age. PELVIS REPRODUCTIVE ORGANS:  Unremarkable for patient's age. URINARY BLADDER:  Unremarkable. ABDOMINAL WALL/INGUINAL REGIONS: Small fat-containing left inguinal hernia. There is some subcutaneous fatty stranding along the right medial buttocks region near the gluteal cleft. No fluid collection or soft tissue gas. There is a fat-containing umbilical hernia without complications. The neck of the hernia is 2 cm diameter.  As noted above, the PEG tube terminates within the abdominal wall itself in the left upper quadrant and needs to be repositioned. There is some inflammation surrounding the PEG tube. No drainable fluid collection seen. No soft tissue gas. OSSEOUS STRUCTURES: No acute findings     Malpositioned PEG tube which terminates in the abdominal wall and needs to be repositioned. Stable or increased lung consolidations in the lower lobes and now also the right middle lobe. While some of this may be atelectasis, pneumonia is not excluded. Correlate for any cough or fever. Correlate for any history of aspiration. Umbilical and left inguinal hernias. Stranding in the subcutaneous fat of the right medial buttocks region may indicate the presence of inflammation. Correlate clinically.  Findings communicated to Riley Timmons by secure text at 8:49 p.m. on 9/24/2023 Workstation performed: [id number]        Assessment/Plan:  Problem List Items Addressed This Visit          Respiratory    Chronic respiratory failure with hypoxia and hypercapnia (HCC)    Multifocal lung consolidation ([address])       Cardiovascular and Mediastinum    Acute pulmonary embolism without acute cor pulmonale (HCC)    Relevant Medications    apixaban (ELIQUIS) 5 mg       Nervous and Auditory    Unilateral vestibular schwannoma (HCC) - Primary    Neuromuscular weakness (HCC)    Mixed axonal-demyelinating polyneuropathy       Genitourinary    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HCC) (Chronic)       Other    Pure hypertriglyceridemia (Chronic)    Depression (Chronic)    Relevant Medications    FLUoxetine (PROzac) 10 mg capsule    Port-A-Cath in place    S/P percutaneous endoscopic gastrostomy (PEG) tube placement [facility])    Relevant Orders    Ambulatory Referral to General Surgery    Ambulatory Referral to Speech Therapy    Status post tracheostomy [facility])    Relevant Medications    Oral Hygiene Products (Toothette Swabs/Dentifrice) SWAB    Other Relevant Orders    Ambulatory Referral to Speech Therapy    Impaired physical mobility    Relevant Medications    Incontinence Supply Disposable (Comfort Shield Adult Diapers) MISC    Psychophysiological insomnia    Relevant Medications    FLUoxetine (PROzac) 10 mg capsule    Melatonin 3 MG/0.9ML LIQD     Other Visit Diagnoses       Coordination of complex care        Bipolar disorder with depression ([address])        Relevant Medications    FLUoxetine (PROzac) 10 mg capsule    Encounter for immunization        Encounter for swallowing study        Relevant Orders    Ambulatory Referral to Speech Therapy    Acute encephalopathy        Relevant Medications    thiamine 100 MG tablet    Vitamin D deficiency        Relevant Medications    cholecalciferol (VITAMIN D3) 1,000 units tablet            There were concerns for MND which were ruled out per neuro note  Reviewed EMG- consistent with sensory and axonal demyelinating polyneuropathy    Can get melatonin via peg tube at night for insomnia  He has home visiting nurses  Refer to local general surgery for PEG management  Will do speech swallow evaluation  Continue follow up with pulmonary and neurology  I discussed with mother , sister and Annabell Blizzard that he does have shorter life expectancy and would be a candidate for Hospice, apparently they did not get a hospice evaluation? I am not sure if this was since they refused. He was never an alcohol drinker and did not respond to treatment with thiamine or folic acid per neurology- he did not have wernicke's encephalopathy. Never has COVID-19 vaccine, unclear if he had COVID-19 disease. Spent >60 min in patient's care, counseling family, reviewing hospital records, coordianating care. Read package inserts for all medications before starting a new medications, call me if you have any questions. Patient was given opportunity to ask questions and all questions were answered.     Disclaimer: Portions of the record may have been created with voice recognition software. Occasional wrong word or "sound a like" substitutions may have occurred due to the inherent limitations of voice recognition software. Read the chart carefully and recognize, using context, where substitutions have occurred. I have used the Epic copy/forward function to compose this note. I have reviewed my current note to ensure it reflects the current patient status, exam, assessment and plan.

## 2023-10-18 NOTE — TELEPHONE ENCOUNTER
When I spoke with sister I had asked if hospice had seen him she had said they didn't need hospice as he is not dieing.

## 2023-10-19 ENCOUNTER — HOME CARE VISIT (OUTPATIENT)
Dept: HOME HEALTH SERVICES | Facility: HOME HEALTHCARE | Age: 38
End: 2023-10-19
Payer: COMMERCIAL

## 2023-10-19 VITALS — HEART RATE: 89 BPM | OXYGEN SATURATION: 99 % | DIASTOLIC BLOOD PRESSURE: 82 MMHG | SYSTOLIC BLOOD PRESSURE: 156 MMHG

## 2023-10-19 PROCEDURE — G0151 HHCP-SERV OF PT,EA 15 MIN: HCPCS

## 2023-10-20 ENCOUNTER — HOME CARE VISIT (OUTPATIENT)
Dept: HOME HEALTH SERVICES | Facility: HOME HEALTHCARE | Age: 38
End: 2023-10-20
Payer: COMMERCIAL

## 2023-10-20 ENCOUNTER — TELEPHONE (OUTPATIENT)
Age: 38
End: 2023-10-20

## 2023-10-20 VITALS — DIASTOLIC BLOOD PRESSURE: 80 MMHG | OXYGEN SATURATION: 99 % | SYSTOLIC BLOOD PRESSURE: 130 MMHG | HEART RATE: 77 BPM

## 2023-10-20 PROCEDURE — G0152 HHCP-SERV OF OT,EA 15 MIN: HCPCS

## 2023-10-20 PROCEDURE — G0153 HHCP-SVS OF S/L PATH,EA 15MN: HCPCS

## 2023-10-20 NOTE — CASE COMMUNICATION
HH ST Eval made 10.18.23   Impression. No po feedings given due to observation of very congested cough at rest, w hx of aspiration and Pt with trach., o2 24 x 7, and on ventilator at night reportedly. VBS w speech completed 8.16.23 at which time mechanically altered diet and thin liquids was recommended. At end of August, Sister reported Pt aspirated and he was made NPO w tube feeding and placed on ventilator at night. Observed Pt to  have inconsistent audible voice. Note. Pt does not have a PMV and his inter-cannula trach is changed daily reportedly   DNT language due to limited verbalizations. 50 percent oriented. Hearing WFL. He reportedly has double vision. Uncertain of visual acuity. Rec. Sp tx 2x wk x 4 wkx. Pt and CG in agreement 10.18 to 11.11.23   Cont informal eval   Practice assignments given   Cont NPO status for now   Cont tube feeding   Use mouthw dixon with toothettes 3x day as tolerated. Squeeze out excess mouthwash on toothette before use. Moisten oral cavity with toothettes 3x day as tolerated. Squeeze out excess water on toothette before use. Aspiration Precautions. Moniter temp and lung condition. Contact  if change on either one. Repeat VBS w speech.

## 2023-10-20 NOTE — CASE COMMUNICATION
HH ST Eval made 10.18.23. Impression. No po feedings given due to observation of very congested cough at rest, w hx of aspiration and Pt with  trach.,   o2   24 x 7, and on ventilator at night reportedly. VBS w speech completed 8.16.23 at which time mechanically altered diet and thin liquids was recommended. At end of August, Sister reported Pt aspirated and he was made NPO w tube feeding and placed on ventilator at night. Obser meron Pt to have inconsistent audible voice. Note. Pt does not have a PMV and his inter-cannula trach is changed daily reportedly   DNT language due to limited verbalizations. 50 percent oriented. Hearing WFL. He reportedly has double vision. Uncertain of visual acuity. Rec. Sp tx 2x wk x 4 wkx. Pt and CG in agreement  10.18 to 11.11.23  Cont informal eval    Practice assignments given   Cont NPO status for now  Cont tube feedi ng  Use mouthwash with toothettes 3x day as tolerated. Squeeze out excess mouthwash on toothette before use. Moisten oral cavity with toothettes 3x day as tolerated. Squeeze out excess water on toothette before use. Aspiration Precautions. Moniter temp and lung condition. Contact  if change on either one. Repeat VBS w speech.

## 2023-10-20 NOTE — TELEPHONE ENCOUNTER
Patient speech therapist called asking if the office can put a swallow study in the patient chart . Patient has not eaten in 2 days and speech therapist would like this done as soon as  possible.  Please call therapist back at the number provided  Magaly Arce

## 2023-10-23 ENCOUNTER — PATIENT OUTREACH (OUTPATIENT)
Dept: CASE MANAGEMENT | Facility: OTHER | Age: 38
End: 2023-10-23

## 2023-10-23 ENCOUNTER — HOME CARE VISIT (OUTPATIENT)
Dept: HOME HEALTH SERVICES | Facility: HOME HEALTHCARE | Age: 38
End: 2023-10-23
Payer: COMMERCIAL

## 2023-10-23 ENCOUNTER — TELEPHONE (OUTPATIENT)
Age: 38
End: 2023-10-23

## 2023-10-23 DIAGNOSIS — J96.12 CHRONIC RESPIRATORY FAILURE WITH HYPOXIA AND HYPERCAPNIA (HCC): ICD-10-CM

## 2023-10-23 DIAGNOSIS — J96.11 CHRONIC RESPIRATORY FAILURE WITH HYPOXIA AND HYPERCAPNIA (HCC): ICD-10-CM

## 2023-10-23 NOTE — TELEPHONE ENCOUNTER
I had place the order at the visit, so truly not sure what this is about. I placed a speech swallow eval again. Only order I see in Epic is video barium swallow, can someone call Aroldo Shelton and Deborah to see which is the correct order. I would discourage strongly from eating without thorough evaluation and also clearance from pulmonary- due to high risk for aspiration pneumonia. Also needs to follow up with general surgery.

## 2023-10-23 NOTE — CASE COMMUNICATION
Phone call to Scarlett at office of Kwabena Oliver to follow up as to why order for repeat VBS w speech was not placed in [facility]. Pt is very anxious to eat since he has not eaten for approx. 1 month or longer. Would like clearance from results of VBS w speech to procede with safe po feedings since Pts lungs may be  compromised.

## 2023-10-23 NOTE — TELEPHONE ENCOUNTER
Denia Quinn the Speech Therapist with [address] [facility] called in again stating the pt is anxious to eat and hasn't eaten for a month or 2. She says she called on Wednesday for a repeat swallow study but it wasn't put in. Then she called in again on Friday afternoon and still no study. She says soon he's gonna eat on his own because he's so anxious. She's requesting a call back at [phone] asap with an update.

## 2023-10-23 NOTE — TELEPHONE ENCOUNTER
[place] called back . Relayed message . will await Pulmonology consult.  Will notify family with update

## 2023-10-23 NOTE — TELEPHONE ENCOUNTER
Mother called, nothing yet at [facility]. Patient almost out of supplies.

## 2023-10-23 NOTE — TELEPHONE ENCOUNTER
Chavez Hawthorne called to change medical supplier for orders that were sent to [facility].     Chavez Hawthorne states that orders need to be sent to [address] [facility] because [facility] doesn't carry the suuplies    Please send orders below to [address] ([employer] Adult [employer]) Rachael Mendez [[phone]]     [employer] (Toothette Swabs/Dentifrice) Sarbjit Lo [[phone]]     Also wants an order for gloves

## 2023-10-23 NOTE — TELEPHONE ENCOUNTER
Called Charo Rao to make aware of this and went straight to voicemail, left a detailed message for a call back.  -- ok for access center to inform Saloni Level message

## 2023-10-23 NOTE — PROGRESS NOTES
I spoke with Celeste Ritchie who reports Loreatha Crigler is getting stronger. He wants to eat but needs a swallow study first. The speech therapist has requested a study order from the PCP but the PCP wants patient to be seen by pulmonary. Patient has an appointment tomorrow. Transportation is arranged, family will transport. No needs at this time.

## 2023-10-23 NOTE — CASE COMMUNICATION
Thank you for your input in regards to the  recommendation of repeat VBS w Speech --   i.e. requesting  pulmonary evaluation by MD / prior to PO trial for him  Pt reportedly has a pulmonary evaluation scheduled for tuesday 10.24.23. Note. Family reportedly was unaware of this recommendation.

## 2023-10-24 ENCOUNTER — OFFICE VISIT (OUTPATIENT)
Dept: PULMONOLOGY | Facility: CLINIC | Age: 38
End: 2023-10-24
Payer: COMMERCIAL

## 2023-10-24 ENCOUNTER — HOSPITAL ENCOUNTER (OUTPATIENT)
Dept: RADIOLOGY | Facility: HOSPITAL | Age: 38
Discharge: HOME | End: 2023-10-24
Payer: COMMERCIAL

## 2023-10-24 ENCOUNTER — HOME CARE VISIT (OUTPATIENT)
Dept: HOME HEALTH SERVICES | Facility: HOME HEALTHCARE | Age: 38
End: 2023-10-24
Payer: COMMERCIAL

## 2023-10-24 VITALS
OXYGEN SATURATION: 98 % | HEART RATE: 79 BPM | SYSTOLIC BLOOD PRESSURE: 110 MMHG | DIASTOLIC BLOOD PRESSURE: 70 MMHG | TEMPERATURE: 96.6 F

## 2023-10-24 VITALS — OXYGEN SATURATION: 99 % | HEART RATE: 84 BPM | DIASTOLIC BLOOD PRESSURE: 80 MMHG | SYSTOLIC BLOOD PRESSURE: 130 MMHG

## 2023-10-24 DIAGNOSIS — I26.99 ACUTE PULMONARY EMBOLISM WITHOUT ACUTE COR PULMONALE (HCC): ICD-10-CM

## 2023-10-24 DIAGNOSIS — J18.1 MULTIFOCAL LUNG CONSOLIDATION (HCC): Primary | ICD-10-CM

## 2023-10-24 DIAGNOSIS — E66.2 OBESITY HYPOVENTILATION SYNDROME (HCC): ICD-10-CM

## 2023-10-24 DIAGNOSIS — J96.12 CHRONIC RESPIRATORY FAILURE WITH HYPOXIA AND HYPERCAPNIA (HCC): ICD-10-CM

## 2023-10-24 DIAGNOSIS — Z93.0 STATUS POST TRACHEOSTOMY (HCC): ICD-10-CM

## 2023-10-24 DIAGNOSIS — J96.11 CHRONIC RESPIRATORY FAILURE WITH HYPOXIA AND HYPERCAPNIA (HCC): ICD-10-CM

## 2023-10-24 DIAGNOSIS — J18.1 MULTIFOCAL LUNG CONSOLIDATION (HCC): ICD-10-CM

## 2023-10-24 PROCEDURE — 71046 X-RAY EXAM CHEST 2 VIEWS: CPT

## 2023-10-24 PROCEDURE — G0152 HHCP-SERV OF OT,EA 15 MIN: HCPCS

## 2023-10-24 PROCEDURE — 99215 OFFICE O/P EST HI 40 MIN: CPT | Performed by: STUDENT IN AN ORGANIZED HEALTH CARE EDUCATION/TRAINING PROGRAM

## 2023-10-24 NOTE — PATIENT INSTRUCTIONS
It was a pleasure seeing you today!     Refer to general surgery for trach management  Speak with [facility] for switching to Pressure support  Video swallow evaluation ordered   See me in 1 month    Josué Layton MD  Pulmonary and [address]

## 2023-10-24 NOTE — PROGRESS NOTES
Progress Note - Pulmonary   Miguel Sherman 41 y.o. male MRN: [id number]  Unit/Bed#:  Encounter: [id number]    Assessment/Plan:      1. Chronic hypoxic and hypercapnic respiratory failure        -Patient presented on 6 L trach collar        -Patient was transition to room for very extended office length without any noted hypoxia        -We will continue on room air for now patient has home pulse ox will monitor at home and keep us updated    2. Tracheostomy w/ ventilator dependent nightly        -9/8/2023- 8 cuffed shiley trach placed per general surgery        -Trach has not been changed since insertion        -Ventilator compliance: 10/2/2023-10/24/2023        -TV: 495,    PIP 21 cm of H2O,   MV 9.8        -Days with device usage 11 days, cumulative hours 131 hours, average usage 6 hours        -Patient reports significant coughing episodes throughout the night when balloon is inflated from trach        -We will transition to BiPAP: IPAP-8 EPAP-8-we will need to continue with cuffed trach         -We will continue Mucomyst and albuterol every 8         -General surgery consult for downsizing and trach change    3. PEG       -9/24/2023-tube placement       -9/25/2023-replacement PEG tube placed after first on was dislodged       -Barium swallow ordered    4. Neuromuscular weakness        -Overall etiology remains unclear        -s/p IVIG, pulse dosing        -Negative: EMG, EEG, autoimmune work-up, MRI, LP        -Overall mobility has significantly improved as patient reports he is walking around at home    5. RLL likely provoked PE w/ out RV dysfunction       -Stuart secondary to prolonged hospitalization and neuromuscular disease       -Currently maintained on Eliquis          Subjective:    Patient presents to the office today for hospital follow-up.   Patient is overall recently complicated medical history which over the last 3 months had a delayed hospitalization but her neuromuscular weakness resulting in inability to wean off the vent and tracheostomy was eventually placed. Patient was recommended to go to [facility] however family insisted on being discharged with oxygen therapy throughout the day and vent nightly. Today patient presents for 1 month follow-up. Unfortunately patient has not seen ear nose and throat or general surgery for any trach evaluation. General surgery referral was placed for trach change and possible downsizing. Objective:    Vitals: Blood pressure 110/70, pulse 79, temperature (!) 96.6 °F (35.9 °C), SpO2 98 %. ,There is no height or weight on file to calculate BMI. [unfilled]    Invasive Devices       Central Venous Catheter Line  Duration             Port A Cath Right -- days              Drain  Duration             Gastrostomy/Enterostomy Percutaneous Interventional Gastrostomy 18 Fr. LUQ 29 days              Airway  Duration             Surgical Airway Shiley Cuffed 45 days                    ROS      Shortness of breath  Cough      Physical Exam:     Physical Exam  Constitutional:       General: He is not in acute distress. Appearance: Normal appearance. He is obese. He is not ill-appearing. HENT:      Head: Normocephalic and atraumatic. Nose: Nose normal. No congestion or rhinorrhea. Mouth/Throat:      Mouth: Mucous membranes are dry. Pharynx: Oropharynx is clear. No oropharyngeal exudate or posterior oropharyngeal erythema. Cardiovascular:      Rate and Rhythm: Normal rate and regular rhythm. Pulses: Normal pulses. Heart sounds: Normal heart sounds. No murmur heard. No friction rub. No gallop. Pulmonary:      Effort: Pulmonary effort is normal. No tachypnea, bradypnea, accessory muscle usage or respiratory distress. Breath sounds: Decreased air movement present. No stridor. Decreased breath sounds present. No wheezing, rhonchi or rales. Comments: Distant breath sounds  Chest:      Chest wall: No tenderness.    Abdominal:      General: Abdomen is flat. Bowel sounds are normal. There is no distension. Palpations: Abdomen is soft. There is no mass. Musculoskeletal:         General: No swelling or tenderness. Normal range of motion. Cervical back: Normal range of motion. No rigidity or tenderness. Skin:     General: Skin is warm and dry. Coloration: Skin is not jaundiced or pale. Neurological:      General: No focal deficit present. Mental Status: He is alert and oriented to person, place, and time. Mental status is at baseline. Psychiatric:         Mood and Affect: Mood normal.         Behavior: Behavior normal.         Labs: I have personally reviewed pertinent lab results. , ABG: No results found for: "PHART", "ZAH1STF", "PO2ART", "ELN5WLA", "T4QTFMNR", "BEART", "SOURCE", BNP: No results found for: "BNP", CMP: No results found for: "SODIUM", "K", "CL", "CO2", "ANIONGAP", "BUN", "CREATININE", "GLUCOSE", "CALCIUM", "AST", "ALT", "ALKPHOS", "PROT", "BILITOT", "EGFR", PT/INR: No results found for: "PT", "INR"        Imaging and other studies: I have personally reviewed pertinent films in PACS

## 2023-10-25 ENCOUNTER — HOME CARE VISIT (OUTPATIENT)
Dept: HOME HEALTH SERVICES | Facility: HOME HEALTHCARE | Age: 38
End: 2023-10-25
Payer: COMMERCIAL

## 2023-10-25 ENCOUNTER — TELEPHONE (OUTPATIENT)
Age: 38
End: 2023-10-25

## 2023-10-25 VITALS — DIASTOLIC BLOOD PRESSURE: 80 MMHG | OXYGEN SATURATION: 97 % | HEART RATE: 75 BPM | SYSTOLIC BLOOD PRESSURE: 148 MMHG

## 2023-10-25 DIAGNOSIS — I43 DILATED CARDIOMYOPATHY SECONDARY TO NEUROMUSCULAR DISORDERS (HCC): Primary | ICD-10-CM

## 2023-10-25 DIAGNOSIS — J96.12 CHRONIC RESPIRATORY FAILURE WITH HYPOXIA AND HYPERCAPNIA (HCC): ICD-10-CM

## 2023-10-25 DIAGNOSIS — Z74.09 IMPAIRED MOBILITY: ICD-10-CM

## 2023-10-25 DIAGNOSIS — G70.9 DILATED CARDIOMYOPATHY SECONDARY TO NEUROMUSCULAR DISORDERS (HCC): Primary | ICD-10-CM

## 2023-10-25 DIAGNOSIS — J96.11 CHRONIC RESPIRATORY FAILURE WITH HYPOXIA AND HYPERCAPNIA (HCC): ICD-10-CM

## 2023-10-25 DIAGNOSIS — F51.04 PSYCHOPHYSIOLOGICAL INSOMNIA: ICD-10-CM

## 2023-10-25 PROCEDURE — G0151 HHCP-SERV OF PT,EA 15 MIN: HCPCS

## 2023-10-25 RX ORDER — ALBUTEROL SULFATE 2.5 MG/3ML
2.5 SOLUTION RESPIRATORY (INHALATION)
Refills: 0 | OUTPATIENT
Start: 2023-10-25

## 2023-10-25 NOTE — TELEPHONE ENCOUNTER
[facility] neurology called they need a doctor to doctor referral for a patient they are seeing tomorrow     it is a hospital follow up   the diagnosis is  G70.9 and Z74.09     thank you

## 2023-10-25 NOTE — TELEPHONE ENCOUNTER
Pts mother called to see if his refills on melatonin and albuterol were done and if the order for gloves s/m, sponges for his mouth and diapers XXX sent to [facility]. Please advise pts sister Chidi Campbell at [phone] when these are ordered. They have called multiple times for this. Refill request in process.

## 2023-10-25 NOTE — TELEPHONE ENCOUNTER
Pts mother called to see if his refills on melatonin and albuterol were done and if the order for gloves s/m, sponges for his mouth and diapers XXX sent to [facility]. Please advise pts sister Dallas White at [phone] when these are ordered. They have called multiple times for this.  (This message added to previous message about Mayela Robertson)

## 2023-10-25 NOTE — CASE COMMUNICATION
This message is informative only. No action required. Pt will not be seen for ST this week until results of repeat VBS w speech obtained which is scheduled 10.31.23 at 915 am at [facility]. Sister Alise Richardson reported that Surgery is scheduled on 10.30 at 3.15 to replace and downsize trach.  with smaller balloon. And apt to have Vent changed to CPAP machine will be scheduled in the near future.

## 2023-10-26 ENCOUNTER — TELEPHONE (OUTPATIENT)
Dept: PULMONOLOGY | Facility: CLINIC | Age: 38
End: 2023-10-26

## 2023-10-26 ENCOUNTER — OFFICE VISIT (OUTPATIENT)
Dept: NEUROLOGY | Facility: CLINIC | Age: 38
End: 2023-10-26
Payer: COMMERCIAL

## 2023-10-26 ENCOUNTER — TELEPHONE (OUTPATIENT)
Age: 38
End: 2023-10-26

## 2023-10-26 VITALS
HEIGHT: 76 IN | DIASTOLIC BLOOD PRESSURE: 80 MMHG | BODY MASS INDEX: 34.83 KG/M2 | SYSTOLIC BLOOD PRESSURE: 124 MMHG | OXYGEN SATURATION: 96 % | RESPIRATION RATE: 18 BRPM | HEART RATE: 84 BPM | TEMPERATURE: 97.5 F | WEIGHT: 286 LBS

## 2023-10-26 DIAGNOSIS — R32 URINARY INCONTINENCE: Primary | ICD-10-CM

## 2023-10-26 DIAGNOSIS — R15.9 BOWEL INCONTINENCE: ICD-10-CM

## 2023-10-26 DIAGNOSIS — I43 DILATED CARDIOMYOPATHY SECONDARY TO NEUROMUSCULAR DISORDERS (HCC): ICD-10-CM

## 2023-10-26 DIAGNOSIS — G70.9 DILATED CARDIOMYOPATHY SECONDARY TO NEUROMUSCULAR DISORDERS (HCC): ICD-10-CM

## 2023-10-26 DIAGNOSIS — G70.9 NEUROMUSCULAR WEAKNESS (HCC): ICD-10-CM

## 2023-10-26 DIAGNOSIS — H53.2 DIPLOPIA: ICD-10-CM

## 2023-10-26 DIAGNOSIS — F51.04 PSYCHOPHYSIOLOGICAL INSOMNIA: Primary | ICD-10-CM

## 2023-10-26 DIAGNOSIS — Z74.09 IMPAIRED MOBILITY: ICD-10-CM

## 2023-10-26 PROCEDURE — 99215 OFFICE O/P EST HI 40 MIN: CPT | Performed by: PSYCHIATRY & NEUROLOGY

## 2023-10-26 RX ORDER — ALBUTEROL SULFATE 2.5 MG/3ML
2.5 SOLUTION RESPIRATORY (INHALATION)
Qty: 270 ML | Refills: 2 | Status: SHIPPED | OUTPATIENT
Start: 2023-10-26 | End: 2024-01-24

## 2023-10-26 NOTE — TELEPHONE ENCOUNTER
Pt's sister calling in with questions regarding the pt's Oxygen and how many liters he needs to be on when O2 levels start to show decrease.

## 2023-10-26 NOTE — PROGRESS NOTES
Patient ID: Kenendy Hernandez is a 41 y.o. male. Assessment/Plan:    Diplopia  Patient with double vision starting after third cycle of etoposide and cisplatin. On physical exam patient with a slightly disconjugate gaze and positive test of skew. Referral placed for ophthalmology. Emphasized the importance of evaluation for prisms as this may have a positive effect in physical therapy. Mixed axonal-demyelinating polyneuropathy  Patient is a 81-year-old male presenting as a hospital follow-up. He has a history of metastatic testicular seminoma S/P chemotherapy, hypertension, bipolar depression, urinary incontinence and bowel incontinence. He has a history of a complex weakness involving bilateral lower extremities and to some extent the upper extremities. Onset was after starting chemo earlier this year. When he was last evaluated by neurology at the end of September patient remained with weakness to the left upper and lower extremity with significant weakness in bilateral lower extremities. Since discharge patient has been improving significantly with physical therapy. He went from a 3 person assist to a 1 person assist.  He has been working on steps. On physical exam he has multiple signs of lower motor neuron disease. Weakness is most prominent at the left hip flexor however able to withhold some resistance. Reflexes are diminished all throughout. He has decreased vibration sense. Rosalio's and ankle clonus absent. At this time, etiology of patient's muscle weakness remains unclear. Potentially could have been a side effect of chemotherapy. Given his history of metastatic seminoma and urinary/bowel incontinence I will image his T-spine as there is no evaluation of this done. I will order a repeat of bilateral lower extremity EMG now that patient can activate muscles more readily. He needs to follow-up with neuromuscular team.  He has been scheduled to see Dr. Chandra Medina on 12/5/2023. Greatly appreciate recommendations. 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 He has a history of metastatic testicular seminoma S/P chemotherapy, hypertension, bipolar depression, urinary incontinence and bowel incontinence. Patient with a very complex medical history over the past few months. Will summarize below:    -History of left testicular seminoma initially noted on imaging in June 2021. Patient lost to follow-up. He was evaluated by urology in December 2022 and imaging demonstrated lymphadenopathy and left testicular mass. -December 2022 radical left orchiectomy  -March 2023 received etoposide and cisplatin chemotherapy. He reported tingling and numbness so cisplatin was stopped. Therapy eventually discontinued secondary to double vision, right-sided weakness, labile affect and MRI brain with white matter changes (see below)     - 8/12/2023 patient first seen by inpatient neurology team at [facility]. Consulted for seizure evaluation given worsening somnolence. There was found to be a functional component of his neuro exam. During hospitalization patient required intubation given somnolence. MRI unchanged to prior.   Video EEG with intermittent excessive diffuse delta activity during drowsiness and excessive amount of slow-wave and REM sleep periods, 2 episodes of leg and body movements that occurred during REM sleep suggesting loss of REM sleep atonia and excessive amount of sleep, possible early transition to REM sleep and history of excessive daytime somnolence which may suggest an REM sleep behavior disorder. Differentials included Wernicke's encephalopathy and patient was started on high dose of IV thiamine. Autoimmune/paraneoplastic disease considered. LP done (see below). Overall no concerning findings. Etiology of symptoms was unclear with differential including Wernicke's encephalopathy vs narcolepsy given vEEG findings vs paraneoplastic vs psychogenic in the setting of cancer diagnosis. He was ultimately discharged to his sister's home with home PT/OT/Speech and visiting nurses. -8/23 patient presented again to [facility] given hypoxia noted by VNA's. He and was started on heparin gtt. and was intubated for worsening encephalopathy and hypoxia. He was transferred to [address] [place] [address] for critical care. Supportive care was recommended while waiting for paraneoplastic labs to consider IVIG versus steroids.  -On 8/25/2023 seems like the first time that patient was unable to move remedies against gravity. Although unclear when weakness started and if it was a potential result of chemotherapy.  -9/15/2023 EMG was done (See results below)   -Patient ultimately placed on trach and PEG and was discharged home. At discharged patient remained weak specifically to the left upper and lower extremity however significant lower extremity weakness bilaterally.     Work-up:  Initial CT head unremarkable  CT chest abdomen pelvis was unremarkable the exception of stable left para-aortic adenopathy, known to Oncology     MRI brain 6/12/2023: Was noted to have stable 2 to 3 mm nodular enhancement within the IACs bilaterally (vestibular schwannomas?)    MRI brain 8/12/2023 :demonstrated enhancement along the anterior floor of the third ventricle/hypothalamus/mamillary bodies which was noted to be stable compared to MRI from March 15, 2023. Also demonstrated nonspecific scattered and punctate T2 signal abnormalities. MRI brain and orbits w/wo 9/9/2023:No acute intracranial pathology. Unremarkable MRI of the orbits. Improving enhancement of the hypothalamus. 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 vEEG which demonstrated intermittent excessive diffuse delta activity during drowsiness and excessive slow-wave and REM sleep periods. Patient had 2 episodes of leg and body jerking occurring during this test while in REM sleep periods suggestive of REM sleep atonia. Management:   There are questions with regards to the possibility of Warnicke's encephalopathy, he was started on IV thiamine however with treatment he had no improvements. Vitamin B1 level was 84. Patient is also found to be vitamin D deficient for which repletion was ordered. Patient had 2 LP test, both with normal CSF protein, negative for infections including VDRL, crypto, meningitis panel, [facility] paraneoplastic panel was negative, anti-TPO, DG and, Sjogren's, ANCA, acetylcholine receptor antibodies and antimusk  were negative. Patient continue to treat with anticoagulation for his bilateral PEs, developed sepsis and pneumonia for which she continued to treat on ventilatory support. Patient continues to have proximal weakness of unknown etiology. All of his neurological testing has been negative    EMG was completed at the bedside which did show some demyelinating and axonal neuropathies with no acute denervation, no sign of motor neuron disease.     Antimyelin associated glycoprotein negative     GM1 IgM negative    SPEP with hypergammaglobulinemia (IVIG completed on 9/11, SPEP drawn on 9/19)    Interval history:  Has been participating with PT/ OT/ Speech   Oldest sister taking care of him, alongside mother and niece   Oxygen requirements have improved  Now on one person assist (within a week went from 3 person assist to 1)   Starting working on steps   On ventilator at night   Not at his baseline mental status, has improved but has extreme difficulty with short term memory   Still has double vision, has referral to ophthalmology but has not been seen  Urinary incontinence started after chemo  Bowel incontinence after last hospital admission     The following portions of the patient's history were reviewed and updated as appropriate: allergies, current medications, past family history, past medical history, past social history, past surgical history, and problem list.         Objective:    Blood pressure 124/80, pulse 84, temperature 97.5 °F (36.4 °C), temperature source Temporal, resp. rate 18, height 6' 4" (1.93 m), weight 130 kg (286 lb), SpO2 96 %. Physical Exam  HENT:      Left Ear: Hearing normal.   Eyes:      General: Lids are normal.      Pupils: Pupils are equal, round, and reactive to light. Neurological:      Deep Tendon Reflexes:      Reflex Scores:       Tricep reflexes are 1+ on the right side and 1+ on the left side. Bicep reflexes are 1+ on the right side and 1+ on the left side. Brachioradialis reflexes are 1+ on the right side and 1+ on the left side. Patellar reflexes are 1+ on the right side and 1+ on the left side. Achilles reflexes are 1+ on the right side and 1+ on the left side. Neurological Exam  Mental Status    Answers question  Trach in place, therefore limited speech   Able to follow commands . Cranial Nerves  CN III, IV, VI: Normal lids and orbits bilaterally. Pupils equal round and reactive to light bilaterally. CN V: Facial sensation is normal.  CN VII: Full and symmetric facial movement. CN VIII:  Right: Hearing is decreased. Left: Hearing is normal.  CN XII: Tongue midline without atrophy or fasciculations.   Hints exam positive . Motor    5/5 strength in bilateral UE biceps, triceps and deltoids   Lower extremities:  L: HF 4, quad 5, ham 5, ankle dorsi/ext 5  R: HF 5-, quad 5, ham 5, ankle dorsi/ext 5. Sensory  Vibration abnormality:   Diminished vibratory sense at the ankle bilaterally . Reflexes                                            Right                      Left  Brachioradialis                    1+                         1+  Biceps                                 1+                         1+  Triceps                                1+                         1+  Patellar                                1+                         1+  Achilles                                1+                         1+    Right pathological reflexes: Rosalio's absent. Ankle clonus absent. Left pathological reflexes: Rosalio's absent. Ankle clonus absent. Gait    Wheelchair . ROS:    Review of Systems      Review of Systems   Constitutional:  Negative for appetite change, fatigue and fever. HENT: Negative. Negative for hearing loss, tinnitus, trouble swallowing and voice change. Eyes: Negative. Negative for photophobia, pain and visual disturbance. Respiratory: Negative. Negative for shortness of breath. Cardiovascular: Negative. Negative for palpitations. Gastrointestinal: Negative. Negative for nausea and vomiting. Endocrine: Negative. Negative for cold intolerance. Genitourinary: Negative. Negative for dysuria, frequency and urgency. Musculoskeletal:  Positive for gait problem (has improved). Negative for back pain, myalgias and neck pain. Skin: Negative. Negative for rash. Allergic/Immunologic: Negative. Neurological:  Positive for weakness. Negative for dizziness, tremors, seizures, syncope, facial asymmetry, speech difficulty, light-headedness, numbness and headaches. Hematological: Negative. Does not bruise/bleed easily. Psychiatric/Behavioral: Negative.   Negative for confusion, hallucinations and sleep disturbance. All other systems reviewed and are negative. I have spent 60 minutes with Patient today in which greater than 50% of this time was spent in counseling/coordination of care regarding Diagnostic results, Prognosis, Risks and benefits of tx options, Instructions for management, Patient and family education, Importance of tx compliance, Risk factor reductions, Impressions, Counseling / Coordination of care, Documenting in the medical record, Reviewing / ordering tests, medicine, procedures  , Obtaining or reviewing history  , and Communicating with other healthcare professionals . I also spent 60 minutes non face to face for this patient the same day.          *History obtained from patient as well as available medical record review    Author:  Lisa Camara MD 10/27/2023 12:17 PM

## 2023-10-26 NOTE — TELEPHONE ENCOUNTER
Flor from [facility] called in stating she received an incontinence supply request from Dr. Brooklyn Vaughan but it was incomplete. She says she faxed back the referral forms yesterday asking for everything to be faxed like contact info, diagnosis info, and insurance. Her fax # is [phone].

## 2023-10-26 NOTE — TELEPHONE ENCOUNTER
Spoke with Christopher Johnson and stated I written a script and faxed it over yesterday for his supplies. The melontin was already sent to the pharmacy but his albuterol was sent to the provider to sign.

## 2023-10-26 NOTE — PROGRESS NOTES
Review of Systems   Constitutional:  Negative for appetite change, fatigue and fever. HENT: Negative. Negative for hearing loss, tinnitus, trouble swallowing and voice change. Eyes: Negative. Negative for photophobia, pain and visual disturbance. Respiratory: Negative. Negative for shortness of breath. Cardiovascular: Negative. Negative for palpitations. Gastrointestinal: Negative. Negative for nausea and vomiting. Endocrine: Negative. Negative for cold intolerance. Genitourinary: Negative. Negative for dysuria, frequency and urgency. Musculoskeletal:  Positive for gait problem (has improved). Negative for back pain, myalgias and neck pain. Skin: Negative. Negative for rash. Allergic/Immunologic: Negative. Neurological:  Positive for weakness. Negative for dizziness, tremors, seizures, syncope, facial asymmetry, speech difficulty, light-headedness, numbness and headaches. Hematological: Negative. Does not bruise/bleed easily. Psychiatric/Behavioral: Negative. Negative for confusion, hallucinations and sleep disturbance. All other systems reviewed and are negative.

## 2023-10-26 NOTE — TELEPHONE ENCOUNTER
Spoke with patient sister and she stated she already contacted the pulmonologist and told her she need to contact his pcp.

## 2023-10-26 NOTE — TELEPHONE ENCOUNTER
His albuterol comes from pulmonary due to his tracheostomy status. Family needs to contact them, all the other orders were previously sent.

## 2023-10-26 NOTE — TELEPHONE ENCOUNTER
I spoke with Elvis and as per her, when the pt is out and if they notice his oxygen is dropping they can put him on oxygen. The goal is to have his O2 levels at 90 or better. The liter flow may vary depending on how much it takes him to stay at 90 or better. I called the pt's sister and informed her. She verbalized understanding.

## 2023-10-27 ENCOUNTER — HOME CARE VISIT (OUTPATIENT)
Dept: HOME HEALTH SERVICES | Facility: HOME HEALTHCARE | Age: 38
End: 2023-10-27
Payer: COMMERCIAL

## 2023-10-27 VITALS — OXYGEN SATURATION: 99 % | SYSTOLIC BLOOD PRESSURE: 120 MMHG | HEART RATE: 97 BPM | DIASTOLIC BLOOD PRESSURE: 85 MMHG

## 2023-10-27 PROCEDURE — G0152 HHCP-SERV OF OT,EA 15 MIN: HCPCS

## 2023-10-27 NOTE — TELEPHONE ENCOUNTER
Patient's mother call asking for  To send the order for patient's incontinence, she states he needs depeds, gloves and mouth swabs send to [address]

## 2023-10-27 NOTE — ASSESSMENT & PLAN NOTE
Patient is a 80-year-old male presenting as a hospital follow-up. He has a history of metastatic testicular seminoma S/P chemotherapy, hypertension, bipolar depression, urinary incontinence and bowel incontinence. He has a history of a complex weakness involving bilateral lower extremities and to some extent the upper extremities. Onset was after starting chemo earlier this year. When he was last evaluated by neurology at the end of September patient remained with weakness to the left upper and lower extremity with significant weakness in bilateral lower extremities. Since discharge patient has been improving significantly with physical therapy. He went from a 3 person assist to a 1 person assist.  He has been working on steps. On physical exam he has multiple signs of lower motor neuron disease. Weakness is most prominent at the left hip flexor however able to withhold some resistance. Reflexes are diminished all throughout. He has decreased vibration sense. Rosalio's and ankle clonus absent. At this time, etiology of patient's muscle weakness remains unclear. Potentially could have been a side effect of chemotherapy. Given his history of metastatic seminoma and urinary/bowel incontinence I will image his T-spine as there is no evaluation of this done. I will order a repeat of bilateral lower extremity EMG now that patient can activate muscles more readily. He needs to follow-up with neuromuscular team.  He has been scheduled to see Dr. Valley Mortimer on 12/5/2023. Greatly appreciate recommendations.

## 2023-10-27 NOTE — ASSESSMENT & PLAN NOTE
Patient with double vision starting after third cycle of etoposide and cisplatin. On physical exam patient with a slightly disconjugate gaze and positive test of skew. Referral placed for ophthalmology. Emphasized the importance of evaluation for prisms as this may have a positive effect in physical therapy.

## 2023-10-28 ENCOUNTER — HOSPITAL ENCOUNTER (EMERGENCY)
Facility: HOSPITAL | Age: 38
Discharge: HOME | End: 2023-10-28
Attending: EMERGENCY MEDICINE
Payer: COMMERCIAL

## 2023-10-28 ENCOUNTER — HOME CARE VISIT (OUTPATIENT)
Dept: HOME HEALTH SERVICES | Facility: HOME HEALTHCARE | Age: 38
End: 2023-10-28
Payer: COMMERCIAL

## 2023-10-28 VITALS
TEMPERATURE: 97.8 F | DIASTOLIC BLOOD PRESSURE: 74 MMHG | HEART RATE: 84 BPM | OXYGEN SATURATION: 98 % | RESPIRATION RATE: 26 BRPM | SYSTOLIC BLOOD PRESSURE: 137 MMHG

## 2023-10-28 DIAGNOSIS — Z43.0 ENCOUNTER FOR TRACHEOSTOMY TUBE CHANGE (HCC): Primary | ICD-10-CM

## 2023-10-28 PROCEDURE — 99283 EMERGENCY DEPT VISIT LOW MDM: CPT | Performed by: PHYSICIAN ASSISTANT

## 2023-10-28 PROCEDURE — 31502 CHANGE OF WINDPIPE AIRWAY: CPT

## 2023-10-28 PROCEDURE — 99284 EMERGENCY DEPT VISIT MOD MDM: CPT

## 2023-10-28 NOTE — DISCHARGE INSTRUCTIONS
Please return to the emergency department for worsening symptoms including chest pain, shortness of breath, dizziness, lightheadedness, fever greater than 103, severe pain, inability to walk, fainting episodes, etc.. Please follow-up with your family practice provider as soon as possible. Follow-up with your pulmonologist or general surgeon at your next appointment for trach recheck.

## 2023-10-28 NOTE — RESPIRATORY THERAPY NOTE
Rt called to ER as back up for reinsertion of trach. Assisted physician with trach insertion. Secured ties onto trach, cuff was left down. Pt was left on RA as per home regimen of the pt and family request SPO2  at the time 98%.

## 2023-10-28 NOTE — ED PROVIDER NOTES
History  Chief Complaint   Patient presents with    Tracheostomy Tube Change     Brought in by EMS, pt pulled trach out, needs new trach placed     This is a 42-year-old male with past medical history significant for recent tracheostomy placement in early 2023 secondary to a prolonged period in the ICU presenting to the emergency department after dislodging his Shiley. Per the patient's family, the patient was put in his chair and another family friend noted approximately an hour later that his Shiley had dislodged. The patient came in via EMS and was saturating well on room air. The patient had his tracheostomy placed in early 2023. He uses an 8.5 Shiley but does not appear to have been replaced since the initial tracheostomy procedure. He has no chest pain or shortness of breath. He has no difficulty breathing. He has no fevers or chills. He has no other complaints at this time. History provided by:  Patient   used: No    Medical Problem  Location:  Shiley Dislodged  Severity:  Mild  Onset quality:  Sudden  Duration:  1 hour  Timing:  Constant  Progression:  Unchanged  Chronicity:  New  Associated symptoms: no abdominal pain, no chest pain, no congestion, no cough, no diarrhea, no ear pain, no fatigue, no fever, no headaches, no loss of consciousness, no myalgias, no nausea, no rash, no rhinorrhea, no shortness of breath, no sore throat, no vomiting and no wheezing        Prior to Admission Medications   Prescriptions Last Dose Informant Patient Reported? Taking?    FLUoxetine (PROzac) 10 mg capsule  Family Member No No   Si capsule (10 mg total) by Per G Tube route daily   Incontinence Supply Disposable (Comfort Shield Adult Diapers) MISC  Family Member No No   Sig: Use 1 each 4 (four) times a day   Melatonin 5 MG/15ML LIQD  Family Member No No   Sig: 15 mL (5 mg total) by Per PEG Tube route daily at bedtime   Oral Hygiene Products (Toothette Swabs/Dentifrice) SWAB  Family Member No No   Sig: Apply to the mouth or throat 3 (three) times a day   Sennosides (senna) 8.8 mg/5 mL oral syrup  Family Member No No   Sig: 10 mL (17.6 mg total) by Per PEG Tube route daily as needed (Constipation)   Patient not taking: Reported on 10/24/2023   acetylcysteine (MUCOMYST) 200 mg/mL nebulizer solution  Family Member No No   Sig: Take 3 mL (600 mg total) by nebulization every 8 (eight) hours Do not start before 2023.    albuterol (2.5 mg/3 mL) 0.083 % nebulizer solution   No No   Sig: Take 3 mL (2.5 mg total) by nebulization every 8 (eight) hours   apixaban (ELIQUIS) 5 mg  Family Member No No   Si tablet (5 mg total) by Per PEG Tube route 2 (two) times a day   cholecalciferol (VITAMIN D3) 1,000 units tablet  Family Member No No   Si tablet (1,000 Units total) by Per G Tube route daily   oxygen gas  Family Member Yes No   Sig: Inhale 2 L/min as needed   polyethylene glycol (MIRALAX) 17 g packet  Family Member No No   Si g by Per PEG Tube route daily as needed (constipation)   Patient not taking: Reported on 10/24/2023   thiamine 100 MG tablet  Family Member No No   Si tablet (100 mg total) by Per G Tube route daily   Patient not taking: Reported on 10/24/2023      Facility-Administered Medications: None       Past Medical History:   Diagnosis Date    Anxiety     Cancer ([facility])     Depression     Migration of percutaneous endoscopic gastrostomy (PEG) tube ([facility]) 2023    Psychiatric disorder     bipolar       Past Surgical History:   Procedure Laterality Date    IR BIOPSY LYMPH NODE  2023    IR GASTROSTOMY TUBE PLACEMENT  2023    IR LUMBAR PUNCTURE  2023    IR PORT PLACEMENT  3/14/2023    ORCHIECTOMY Left 2022    Procedure: ORCHIECTOMY INGUINAL;  Surgeon: Mango Marrero MD;  Location: [facility];  Service: Urology    PEG W/TRACHEOSTOMY PLACEMENT N/A 2023    Procedure: TRACHEOSTOMY WITH INSERTION PEG TUBE; Surgeon: Otto Mckeon MD;  Location: [facility] OR;  Service: General       Family History   Problem Relation Age of Onset    Coronary artery disease Maternal Aunt      I have reviewed and agree with the history as documented. E-Cigarette/Vaping    E-Cigarette Use Never User      E-Cigarette/Vaping Substances    Nicotine No     THC Yes     CBD No     Flavoring No     Other No     Unknown No      Social History     Tobacco Use    Smoking status: Former     Packs/day: 0.50     Years: 5.00     Total pack years: 2.50     Types: Cigarettes     Start date: 1/1/2008    Smokeless tobacco: Never   Vaping Use    Vaping Use: Never used   Substance Use Topics    Alcohol use: Not Currently     Comment: 5 years ago    Drug use: Not Currently     Types: Marijuana     Comment: Medical marijuana       Review of Systems   Constitutional:  Negative for appetite change, chills, diaphoresis, fatigue and fever. HENT:  Negative for congestion, ear pain, rhinorrhea and sore throat. Eyes:  Negative for visual disturbance. Respiratory:  Negative for cough, chest tightness, shortness of breath and wheezing. Cardiovascular:  Negative for chest pain, palpitations and leg swelling. Gastrointestinal:  Negative for abdominal pain, constipation, diarrhea, nausea and vomiting. Musculoskeletal:  Negative for myalgias, neck pain and neck stiffness. Skin:  Negative for rash and wound. Neurological:  Negative for dizziness, seizures, loss of consciousness, syncope, weakness, light-headedness, numbness and headaches. Psychiatric/Behavioral:  Negative for confusion. All other systems reviewed and are negative. Physical Exam  Physical Exam  Vitals and nursing note reviewed. Constitutional:       General: He is not in acute distress. Appearance: Normal appearance. He is normal weight. He is not ill-appearing, toxic-appearing or diaphoretic. HENT:      Head: Normocephalic and atraumatic.       Nose: Nose normal. No congestion or rhinorrhea. Mouth/Throat:      Mouth: Mucous membranes are moist.      Pharynx: No oropharyngeal exudate or posterior oropharyngeal erythema. Eyes:      General: No scleral icterus. Right eye: No discharge. Left eye: No discharge. Conjunctiva/sclera: Conjunctivae normal.   Cardiovascular:      Rate and Rhythm: Normal rate and regular rhythm. Pulses: Normal pulses. Heart sounds: Normal heart sounds. No murmur heard. No friction rub. No gallop. Pulmonary:      Effort: Pulmonary effort is normal. No respiratory distress. Breath sounds: Normal breath sounds. No stridor. No wheezing, rhonchi or rales. Chest:      Chest wall: No tenderness. Musculoskeletal:         General: Normal range of motion. Cervical back: Normal range of motion. No rigidity. Right lower leg: No edema. Left lower leg: No edema. Skin:     General: Skin is warm and dry. Capillary Refill: Capillary refill takes less than 2 seconds. Coloration: Skin is not jaundiced or pale. Neurological:      General: No focal deficit present. Mental Status: He is alert and oriented to person, place, and time. Mental status is at baseline. Psychiatric:         Mood and Affect: Mood normal.         Behavior: Behavior normal.         Vital Signs  ED Triage Vitals [10/28/23 1145]   Temperature Pulse Respirations Blood Pressure SpO2   97.8 °F (36.6 °C) 60 (!) 26 137/74 98 %      Temp Source Heart Rate Source Patient Position - Orthostatic VS BP Location FiO2 (%)   Oral -- -- -- --      Pain Score       --           Vitals:    10/28/23 1145 10/28/23 1220   BP: 137/74    Pulse: 60 84         Visual Acuity      ED Medications  Medications - No data to display    Diagnostic Studies  Results Reviewed       None                   No orders to display              Procedures  Procedures         ED Course  ED Course as of 10/28/23 1955   Sat Oct 28, 2023   1148 Respiratory called. Per chart review, the patient has an 8 cuffed Shiley. Will attempt to replace and continue to monitor. 1150 SpO2: 98 %   1242 Trach was replaced by Dr. Amanuel Cox. Patient is saturating well. 4 Shiley placed. Will discharge. Medical Decision Making  82-year-old male presenting to the emergency department today after dislodging his trach. It came out approximately 1 hour prior to arrival.  It was placed in early September 2023. Vital signs are stable. No hypoxia. Saturating well on room air. I reviewed prior notes and it appears that the patient had an 8.5 cuffed Shiley that has not been replaced since initial placement of tracheostomy. He is scheduled to be downsized next week. We were unable to pass an 8 Shiley here in the emergency department but we were able to pass a 4 Shiley. This was placed by Dr. Amanuel Cox. Patient doing well after this and continues to saturate well. The patient is stable for discharge at this time. Follow-up with general surgeon and pulmonologist.  Strict return precautions were given. Recommend PCP follow-up as soon as possible. The patient and/or patient's proxy verify their understanding and agree to the plan at this time. All questions answered to the patient and/or their proxy's satisfaction. All labs reviewed and utilized in the medical decision making process (if labs were ordered). Portions of the record may have been created with voice recognition software. Occasional wrong word or "sound a like" substitutions may have occurred due to the inherent limitations of voice recognition software. Read the chart carefully and recognize, using context, where substitutions have occurred. I reviewed prior notes. I reviewed prior procedures.     Problems Addressed:  Encounter for tracheostomy tube change [facility]): undiagnosed new problem with uncertain prognosis    Amount and/or Complexity of Data Reviewed  External Data Reviewed: notes. Discussion of management or test interpretation with external provider(s): Dr. Latha Keys             Disposition  Final diagnoses:   Encounter for tracheostomy tube change [facility])     Time reflects when diagnosis was documented in both MDM as applicable and the Disposition within this note       Time User Action Codes Description Comment    10/28/2023 12:59 PM Michael, [address] [address] [Z43.0] Encounter for tracheostomy tube change [facility])           ED Disposition       ED Disposition   Discharge    Condition   Stable    Date/Time   Sat Oct 28, 2023  1:00 PM    Comment   [facility] discharge to home/self care.                    Follow-up Information       Follow up With Specialties Details Why Contact Info Additional Information    Jalyn Griffin MD Family Medicine Schedule an appointment as soon as possible for a visit   [address] [facility] [place] [address] [place] [address] Emergency Department Emergency Medicine Go to  If symptoms worsen [address] [phone]  [address] [facility] Emergency Department, [address] [place] [address], [address] [facility]            Discharge Medication List as of 10/28/2023  1:00 PM        CONTINUE these medications which have NOT CHANGED    Details   acetylcysteine (MUCOMYST) 200 mg/mL nebulizer solution Take 3 mL (600 mg total) by nebulization every 8 (eight) hours Do not start before September 29, 2023., Starting Fri 9/29/2023, No Print      albuterol (2.5 mg/3 mL) 0.083 % nebulizer solution Take 3 mL (2.5 mg total) by nebulization every 8 (eight) hours, Starting Thu 10/26/2023, Until Wed 1/24/2024, Normal      apixaban (ELIQUIS) 5 mg 1 tablet (5 mg total) by Per PEG Tube route 2 (two) times a day, Starting Wed 10/18/2023, Normal      cholecalciferol (VITAMIN D3) 1,000 units tablet 1 tablet (1,000 Units total) by Per G Tube route daily, Starting Wed 10/18/2023, Normal FLUoxetine (PROzac) 10 mg capsule 1 capsule (10 mg total) by Per G Tube route daily, Starting Wed 10/18/2023, Normal      Incontinence Supply Disposable (Comfort Shield Adult Diapers) MISC Use 1 each 4 (four) times a day, Starting Wed 10/18/2023, Normal      Melatonin 5 MG/15ML LIQD 15 mL (5 mg total) by Per PEG Tube route daily at bedtime, Starting Thu 10/26/2023, Until Wed 1/24/2024, Normal      Oral Hygiene Products (Toothette Swabs/Dentifrice) SWAB Apply to the mouth or throat 3 (three) times a day, Starting Wed 10/18/2023, Normal      oxygen gas Inhale 2 L/min as needed, Historical Med      polyethylene glycol (MIRALAX) 17 g packet 17 g by Per PEG Tube route daily as needed (constipation), Starting Sat 9/23/2023, Normal      Sennosides (senna) 8.8 mg/5 mL oral syrup 10 mL (17.6 mg total) by Per PEG Tube route daily as needed (Constipation), Starting Sat 9/23/2023, Normal      thiamine 100 MG tablet 1 tablet (100 mg total) by Per G Tube route daily, Starting Wed 10/18/2023, Normal             No discharge procedures on file.     PDMP Review       None            ED Provider  Electronically Signed by             Joby Maldonado PA-C  10/28/23 2002

## 2023-10-30 ENCOUNTER — CONSULT (OUTPATIENT)
Dept: SURGERY | Facility: CLINIC | Age: 38
End: 2023-10-30

## 2023-10-30 ENCOUNTER — PATIENT OUTREACH (OUTPATIENT)
Dept: CASE MANAGEMENT | Facility: OTHER | Age: 38
End: 2023-10-30

## 2023-10-30 VITALS — HEIGHT: 76 IN | WEIGHT: 293 LBS | BODY MASS INDEX: 35.68 KG/M2

## 2023-10-30 DIAGNOSIS — Z93.0 STATUS POST TRACHEOSTOMY (HCC): ICD-10-CM

## 2023-10-30 DIAGNOSIS — Z93.1 S/P PERCUTANEOUS ENDOSCOPIC GASTROSTOMY (PEG) TUBE PLACEMENT (HCC): ICD-10-CM

## 2023-10-30 DIAGNOSIS — Z93.0 TRACHEOSTOMY IN PLACE (HCC): Primary | ICD-10-CM

## 2023-10-30 NOTE — PROGRESS NOTES
ADT alert received. Patient was in the ED at [address] [place] [address] on 10/28 after patient removed his trach tube. It was replaced and he discharged home. I spoke with his sister Porsche Alvarez who is his caregiver who reports he is doing well. He has an appointment for a swallow test tomorrow and Porsche Alvraez will drive him. No other need per Porsche Alvarez.

## 2023-10-30 NOTE — PROGRESS NOTES
Patient is a 71-year-old gentleman with an undiagnosed neuromuscular disease and was admitted to the ICU and had difficulty weaning off the ventilator. Required a tracheostomy 8.0 Shiley cuffed tube, PEG tube and was discharged home as the patient's family refused LTAC at nursing facility. He was readmitted in 24 hours due to a PEG tube malfunction. He required ventilator nightly with trach collar during the day. After another long discussion with case management,  and home health care agency the family requested the patient be discharged home and not to a facility. Since discharge he has not had any ventilator awaiting for tracheostomy exchange. Has been seen by speech therapy and pulmonary. A videofluoroscopy swallow exam is trying to be arranged. Currently, he is on pressure support at night. Was seen at [facility] emergency room over the weekend because his tracheostomy tube came out. They downsized this to a size 4 cuffed tube. By my review of what is available in epic, he still requires pressure support at night, and therefore the current tracheostomy tube should remain. If and when is no longer requiring the ventilator, a fenestrated tube can be considered or he can be simply decannulated depending on how he is doing. He will need to be assessed by pulmonary and speech therapy and recommendations made before any further tracheostomy tube changes are considered.

## 2023-10-31 ENCOUNTER — HOSPITAL ENCOUNTER (OUTPATIENT)
Dept: RADIOLOGY | Facility: HOSPITAL | Age: 38
Discharge: HOME | End: 2023-10-31
Attending: STUDENT IN AN ORGANIZED HEALTH CARE EDUCATION/TRAINING PROGRAM
Payer: COMMERCIAL

## 2023-10-31 ENCOUNTER — TELEPHONE (OUTPATIENT)
Dept: PULMONOLOGY | Facility: CLINIC | Age: 38
End: 2023-10-31

## 2023-10-31 DIAGNOSIS — Z93.0 STATUS POST TRACHEOSTOMY (HCC): ICD-10-CM

## 2023-10-31 DIAGNOSIS — E66.2 OBESITY HYPOVENTILATION SYNDROME (HCC): Primary | ICD-10-CM

## 2023-10-31 PROCEDURE — 74230 X-RAY XM SWLNG FUNCJ C+: CPT

## 2023-10-31 PROCEDURE — 92611 MOTION FLUOROSCOPY/SWALLOW: CPT

## 2023-10-31 NOTE — TELEPHONE ENCOUNTER
Elias Gutierrez called and lvm in regards to the patient asking to speak with a provider or nurse because she has questions and concerns about Hemanth's trach. Please advise.

## 2023-10-31 NOTE — PROCEDURES
Video Swallow Study      Patient Name: Abel Nelson  [id number]'[id number] Date: 10/31/2023         Videofluoroscopic Barium Swallow Study   Speech Pathology Report        Patient Name: Abel Nelson    WAFMS'I Date: 10/31/2023        Summary of Assessment:  Oral and Pharyngeal stages of swallowing appeared [facility]- mildly decreased laryngeal excursion noted and minimally decreased airway entrance closure resulting in trace-transient penetration w/ thin liquids by cup. No aspiration observed. Recommendations:  Diet:Soft diet to start, advance as tolerated  Liquids: Thin liquids  Meds: w/ water   Aspiration Precautions  Results reviewed with: pt and family members who accompanied pt        Note: Images are available for review in PACS as desired. General Information;  40 yo gentleman referred to [facility]  for a VBS by Dr. Eli Bennett for dysphagia; assess for aspiration. Per family report, pt was discharged to home after last admission in [facility]. Pt became lethargic w/ waxing and waning arousal. Pt was re-admitted to [facility] and made NPO due to suspected aspiration. PEG tube used for primary nutrition. Pt was  seen in the ED at [facility] 10/30/23 due to tracheostomy tube came out, it was replaced w/ a #4 cuffed tube. Pt was seen in the radiology department for a Videofluoroscopic Barium Swallow Study. The pt was seated and viewed in the lateral  position for assessment with the following consistencies: puree, solids, nectar thick liquids, thin liquids, barium pill w/ water by straw . Trials were administered following the  [facility] Validated Protocol. Oral stage:  WFL       Mastication was timely and grossly effective with materials administered today. Bolus formation and transfer were functional.  Oral control appeared adequate with no gross premature spillage over the base of tongue. Mild oral residue was noted.      Lip Closure: complete  Tongue Control During Bolus Hold:good control between tongue and palate   Bolus Preparation/Mastication: Timely and effective mastication/bolus formation w/ edentulous state. Bolus Transport/Lingual Motion: timely tongue motion for bolus transfer  Oral Residue:mild residue noted w/ foods  Initiation of the Pharyngeal Swallow: timely swallow initiation w/ bolus head at angle of ramus. Pharyngeal stage:   [facility]     Velopharyngeal Closure: no bolus noted between soft palate and pharyngeal wall  Laryngeal Elevation: decreased laryngeal excursion  Anterior Hyoid Excursion: decreased anterior laryngeal mvt  Epiglottic Movement/Inversion: complete inversion  Laryngeal Vestibular Closure: slight line of air/contrast in laryngeal vestibule.   Pharyngeal Stripping Wave: complete  Pharyngeal Contraction (from AP view):  NA  Pharyngoesophageal segment Opening: complete distension, no obstruction of flow  Base of Tongue Retraction: no contrast between tongue base and posterior pharyngeal wall  Pharyngeal residue: no pharyngeal residue noted           Esophageal Stage Screening: all materials cleared the esophagus and emptied into the stomach in a timely manner     Penetration/Aspiration:  Thin: 2 by cup  Nectar:1  Honey:NA  Puree: 1  Solid: 1  Response to Aspiration:no aspiration observed   Strategies/Efficacy:    8-Point Penetration-Aspiration Scale   1 Material does not enter the airway   2 Material enters the airway, remains above the vocal folds, and is ejected  from the  airway    3 Material enters the airway, remains above the vocal folds, and is not ejected from the airway   4 Material enters the airway, contacts the vocal folds, and is ejected from the airway   5 Material enters the airway, contacts the vocal folds, and is not ejected from the airway    6 Material enters the airway, passes below the vocal folds and is ejected into the larynx or out of the airway    7 Material enters the airway, passes below the vocal folds, and is not ejected from the trachea despite effort    8 Material enters the airway, passes below the vocal folds, and no effort is made to eject         April Kamran Radford MA CCC-SLP  Speech Pathologist  [place] license # [id number]  [id number]  [address] license # [id number]  Available via [employer]

## 2023-11-01 ENCOUNTER — HOME CARE VISIT (OUTPATIENT)
Dept: HOME HEALTH SERVICES | Facility: HOME HEALTHCARE | Age: 38
End: 2023-11-01
Payer: COMMERCIAL

## 2023-11-01 PROCEDURE — G0153 HHCP-SVS OF S/L PATH,EA 15MN: HCPCS

## 2023-11-01 NOTE — TELEPHONE ENCOUNTER
I left a message for Brooklyn Loza to call us back with any questions. I did also mention they can call the surgeon as well as it looks like he was referred there for trach management. yes

## 2023-11-02 ENCOUNTER — HOME CARE VISIT (OUTPATIENT)
Dept: HOME HEALTH SERVICES | Facility: HOME HEALTHCARE | Age: 38
End: 2023-11-02
Payer: COMMERCIAL

## 2023-11-03 ENCOUNTER — HOME CARE VISIT (OUTPATIENT)
Dept: HOME HEALTH SERVICES | Facility: HOME HEALTHCARE | Age: 38
End: 2023-11-03
Payer: COMMERCIAL

## 2023-11-03 VITALS — OXYGEN SATURATION: 96 % | SYSTOLIC BLOOD PRESSURE: 144 MMHG | HEART RATE: 65 BPM | DIASTOLIC BLOOD PRESSURE: 88 MMHG

## 2023-11-03 VITALS — DIASTOLIC BLOOD PRESSURE: 88 MMHG | HEART RATE: 65 BPM | SYSTOLIC BLOOD PRESSURE: 144 MMHG | OXYGEN SATURATION: 98 %

## 2023-11-03 PROCEDURE — G0153 HHCP-SVS OF S/L PATH,EA 15MN: HCPCS

## 2023-11-03 PROCEDURE — G0152 HHCP-SERV OF OT,EA 15 MIN: HCPCS

## 2023-11-03 PROCEDURE — G0151 HHCP-SERV OF PT,EA 15 MIN: HCPCS

## 2023-11-05 ENCOUNTER — HOSPITAL ENCOUNTER (EMERGENCY)
Facility: HOSPITAL | Age: 38
Discharge: HOME | End: 2023-11-05
Attending: EMERGENCY MEDICINE
Payer: COMMERCIAL

## 2023-11-05 VITALS
HEIGHT: 76 IN | SYSTOLIC BLOOD PRESSURE: 150 MMHG | OXYGEN SATURATION: 94 % | BODY MASS INDEX: 35.76 KG/M2 | DIASTOLIC BLOOD PRESSURE: 80 MMHG | TEMPERATURE: 98.6 F | WEIGHT: 293.65 LBS | HEART RATE: 82 BPM | RESPIRATION RATE: 40 BRPM

## 2023-11-05 DIAGNOSIS — J95.03 TRACHEOSTOMY MALFUNCTION (HCC): Primary | ICD-10-CM

## 2023-11-05 PROCEDURE — 94760 N-INVAS EAR/PLS OXIMETRY 1: CPT

## 2023-11-05 PROCEDURE — 99284 EMERGENCY DEPT VISIT MOD MDM: CPT

## 2023-11-05 PROCEDURE — 31502 CHANGE OF WINDPIPE AIRWAY: CPT

## 2023-11-05 PROCEDURE — 99283 EMERGENCY DEPT VISIT LOW MDM: CPT | Performed by: EMERGENCY MEDICINE

## 2023-11-06 ENCOUNTER — HOME CARE VISIT (OUTPATIENT)
Dept: HOME HEALTH SERVICES | Facility: HOME HEALTHCARE | Age: 38
End: 2023-11-06
Payer: COMMERCIAL

## 2023-11-06 VITALS — SYSTOLIC BLOOD PRESSURE: 150 MMHG | HEART RATE: 70 BPM | OXYGEN SATURATION: 94 % | DIASTOLIC BLOOD PRESSURE: 86 MMHG

## 2023-11-06 PROCEDURE — G0151 HHCP-SERV OF PT,EA 15 MIN: HCPCS

## 2023-11-06 NOTE — ED PROVIDER NOTES
History  Chief Complaint   Patient presents with    Tracheostomy Tube Evaluation     Pt arrives via EMS with report of accidentally removing his tracheostomy tube; pt reports he has had the tube for less than 5 years     30-year-old male with history of neuromuscular disorder resulting in tracheostomy and PEG placement who presents for evaluation after accidental dislodgement of his tracheostomy tube. Patient offers no complaints at this time. He had the tracheostomy placed on 2023. It has dislodged multiple times since placement. It was most recently replaced with a 6.5 shiley on 2023. Prior to Admission Medications   Prescriptions Last Dose Informant Patient Reported? Taking? FLUoxetine (PROzac) 10 mg capsule  Family Member No No   Si capsule (10 mg total) by Per G Tube route daily   Incontinence Supply Disposable (Comfort Shield Adult Diapers) MISC  Family Member No No   Sig: Use 1 each 4 (four) times a day   Melatonin 5 MG/15ML LIQD  Family Member No No   Sig: 15 mL (5 mg total) by Per PEG Tube route daily at bedtime   Oral Hygiene Products (Toothette Swabs/Dentifrice) SWAB  Family Member No No   Sig: Apply to the mouth or throat 3 (three) times a day   Sennosides (senna) 8.8 mg/5 mL oral syrup  Family Member No No   Sig: 10 mL (17.6 mg total) by Per PEG Tube route daily as needed (Constipation)   Patient not taking: Reported on 10/24/2023   acetylcysteine (MUCOMYST) 200 mg/mL nebulizer solution  Family Member No No   Sig: Take 3 mL (600 mg total) by nebulization every 8 (eight) hours Do not start before 2023.    albuterol (2.5 mg/3 mL) 0.083 % nebulizer solution   No No   Sig: Take 3 mL (2.5 mg total) by nebulization every 8 (eight) hours   apixaban (ELIQUIS) 5 mg  Family Member No No   Si tablet (5 mg total) by Per PEG Tube route 2 (two) times a day   cholecalciferol (VITAMIN D3) 1,000 units tablet  Family Member No No   Si tablet (1,000 Units total) by Per G Tube route daily   oxygen gas  Family Member Yes No   Sig: Inhale 2 L/min as needed   polyethylene glycol (MIRALAX) 17 g packet  Family Member No No   Si g by Per PEG Tube route daily as needed (constipation)   Patient not taking: Reported on 10/24/2023   thiamine 100 MG tablet  Family Member No No   Si tablet (100 mg total) by Per G Tube route daily   Patient not taking: Reported on 10/24/2023      Facility-Administered Medications: None       Past Medical History:   Diagnosis Date    Anxiety     Cancer ([address])     Depression     Migration of percutaneous endoscopic gastrostomy (PEG) tube ([address]) 2023    Psychiatric disorder     bipolar       Past Surgical History:   Procedure Laterality Date    IR BIOPSY LYMPH NODE  2023    IR GASTROSTOMY TUBE PLACEMENT  2023    IR LUMBAR PUNCTURE  2023    IR PORT PLACEMENT  3/14/2023    ORCHIECTOMY Left 2022    Procedure: ORCHIECTOMY INGUINAL;  Surgeon: Miguel Ángel Vital MD;  Location:  MAIN OR;  Service: Urology    PEG W/TRACHEOSTOMY PLACEMENT N/A 2023    Procedure: TRACHEOSTOMY WITH INSERTION PEG TUBE;  Surgeon: Matteo Mckeon MD;  Location: [facility];  Service: General       Family History   Problem Relation Age of Onset    Coronary artery disease Maternal Aunt      I have reviewed and agree with the history as documented. E-Cigarette/Vaping    E-Cigarette Use Never User      E-Cigarette/Vaping Substances    Nicotine No     THC Yes     CBD No     Flavoring No     Other No     Unknown No      Social History     Tobacco Use    Smoking status: Former     Packs/day: 0.50     Years: 5.00     Total pack years: 2.50     Types: Cigarettes     Start date: 2008    Smokeless tobacco: Never   Vaping Use    Vaping Use: Never used   Substance Use Topics    Alcohol use: Not Currently     Comment: 5 years ago    Drug use: Not Currently     Types: Marijuana     Comment: Medical marijuana       Review of Systems   Constitutional:  Negative for fever. Respiratory:  Negative for shortness of breath. Cardiovascular:  Negative for chest pain. Gastrointestinal:  Negative for abdominal pain and vomiting. Genitourinary:  Negative for flank pain. Skin:  Negative for rash. All other systems reviewed and are negative. Physical Exam  Physical Exam  Vitals reviewed. Constitutional:       General: He is not in acute distress. Appearance: He is not ill-appearing. HENT:      Head: Normocephalic and atraumatic. Nose: Nose normal.      Mouth/Throat:      Mouth: Mucous membranes are moist.   Eyes:      Conjunctiva/sclera: Conjunctivae normal.   Neck:      Comments: Anterior tracheostomy site noted. Cardiovascular:      Rate and Rhythm: Normal rate and regular rhythm. Heart sounds: No murmur heard. Pulmonary:      Effort: Pulmonary effort is normal.      Breath sounds: Normal breath sounds. No wheezing, rhonchi or rales. Abdominal:      General: There is no distension. Palpations: Abdomen is soft. Tenderness: There is no abdominal tenderness. Musculoskeletal:         General: No swelling or tenderness. Normal range of motion. Cervical back: Normal range of motion and neck supple. Skin:     General: Skin is warm and dry. Neurological:      General: No focal deficit present. Mental Status: He is alert. Mental status is at baseline.          Vital Signs  ED Triage Vitals   Temperature Pulse Respirations Blood Pressure SpO2   11/05/23 1932 11/05/23 1932 11/05/23 1932 11/05/23 1932 11/05/23 1932   98.6 °F (37 °C) 82 (!) 40 150/80 98 %      Temp Source Heart Rate Source Patient Position - Orthostatic VS BP Location FiO2 (%)   11/05/23 1932 11/05/23 1932 11/05/23 1932 11/05/23 1932 11/05/23 2010   Oral Monitor Lying Left arm 30      Pain Score       11/05/23 1932       No Pain           Vitals:    11/05/23 1932   BP: 150/80   Pulse: 82   Patient Position - Orthostatic VS: Lying         Visual Acuity      ED Medications  Medications - No data to display    Diagnostic Studies  Results Reviewed       None                   No orders to display              Procedures  Procedures         ED Course  ED Course as of 11/05/23 2125   [facility] Nov 05, 2023 2010 Tracheostomy tube replaced by respiratory therapist. 4 Shiley replaced. 2030 Discussed with patient's sister Latha Harmon who sent him to the ED. Advised her that his tracheostomy was replaced. She had no other concerns. He is intermittently on trach collar at home as needed. She is aware he will be returning home. SBIRT 22yo+      Flowsheet Row Most Recent Value   JEAN MARIE: How many times in the past year have you. .. Used an illegal drug or used a prescription medication for non-medical reasons? Never Filed at: 11/05/2023 1936                      Medical Decision Making  62-year-old male presenting for evaluation after accidental dislodgment of his tracheostomy tube. Patient has no complaints. Tracheostomy tube was replaced by respiratory therapist.  Patient on trach collar. Otherwise stable for discharge at this time. Advised follow-up with primary care physician and surgery. Return precautions discussed. Problems Addressed:  Tracheostomy malfunction [facility]): acute illness or injury             Disposition  Final diagnoses:   Tracheostomy malfunction [facility])     Time reflects when diagnosis was documented in both MDM as applicable and the Disposition within this note       Time User Action Codes Description Comment    11/5/2023  7:43 PM Nestor Ma Add [J95.03] Tracheostomy malfunction [facility])           ED Disposition       ED Disposition   Discharge    Condition   Stable    Date/Time   Sun Nov 5, 2023 2022    [address] [place] discharge to home/self care.                    Follow-up Information       Follow up With Specialties Details Why Contact Info    Allie Willams MD Family Medicine Schedule an appointment as soon as possible for a visit   [address] [place] [address] [address]  [address] [address]  [phone]              Discharge Medication List as of 11/5/2023  8:30 PM        CONTINUE these medications which have NOT CHANGED    Details   acetylcysteine (MUCOMYST) 200 mg/mL nebulizer solution Take 3 mL (600 mg total) by nebulization every 8 (eight) hours Do not start before September 29, 2023., Starting Fri 9/29/2023, No Print      albuterol (2.5 mg/3 mL) 0.083 % nebulizer solution Take 3 mL (2.5 mg total) by nebulization every 8 (eight) hours, Starting Thu 10/26/2023, Until Wed 1/24/2024, Normal      apixaban (ELIQUIS) 5 mg 1 tablet (5 mg total) by Per PEG Tube route 2 (two) times a day, Starting Wed 10/18/2023, Normal      cholecalciferol (VITAMIN D3) 1,000 units tablet 1 tablet (1,000 Units total) by Per G Tube route daily, Starting Wed 10/18/2023, Normal      FLUoxetine (PROzac) 10 mg capsule 1 capsule (10 mg total) by Per G Tube route daily, Starting Wed 10/18/2023, Normal      Incontinence Supply Disposable (Comfort Shield Adult Diapers) MISC Use 1 each 4 (four) times a day, Starting Wed 10/18/2023, Normal      Melatonin 5 MG/15ML LIQD 15 mL (5 mg total) by Per PEG Tube route daily at bedtime, Starting Thu 10/26/2023, Until Wed 1/24/2024, Normal      Oral Hygiene Products (Toothette Swabs/Dentifrice) SWAB Apply to the mouth or throat 3 (three) times a day, Starting Wed 10/18/2023, Normal      oxygen gas Inhale 2 L/min as needed, Historical Med      polyethylene glycol (MIRALAX) 17 g packet 17 g by Per PEG Tube route daily as needed (constipation), Starting Sat 9/23/2023, Normal      Sennosides (senna) 8.8 mg/5 mL oral syrup 10 mL (17.6 mg total) by Per PEG Tube route daily as needed (Constipation), Starting Sat 9/23/2023, Normal      thiamine 100 MG tablet 1 tablet (100 mg total) by Per G Tube route daily, Starting Wed 10/18/2023, Normal             No discharge procedures on file.     PDMP Review       None            ED Provider  Electronically Signed by             Alan Munoz MD  11/05/23 2144

## 2023-11-06 NOTE — TELEPHONE ENCOUNTER
Spoke w/ Dr. Sheridan Ayala to patient's wife he continues to remain very active he continues to follow his cannula without any noted hypoxia his wife reports that his ventilator was never switched to pressure support, at this time given his activity no noted hypoxia and the machine continuously beeping likely secondary to overbreathing we will order a stat ABG-patient  does not have to wear ventilator tonight and obtain stat ABG in a.m.       Can we please call his [employer] company and please verify that they have never changed pressures despite the order for pressure change-if they state that they have not changed her pressure can we please find out why

## 2023-11-06 NOTE — ED NOTES
Paperwork provided to EMS crew and patient prepared for transport to residence.       Olive Alvarez RN  11/05/23 1338

## 2023-11-06 NOTE — TELEPHONE ENCOUNTER
Pt's sister Carolene Langdon calling in stating pt has taken out his trach 3 times and he has been needing to go by ambulance to have them put it back in. She stated the pt tells her he does not want it in and that is why he keeps taking it out but she is unsure what to do for him. Meaghan Florez also stated that the order for his pressure change is in correct according to resmed.

## 2023-11-06 NOTE — DISCHARGE INSTRUCTIONS
Follow up with your primary care physician and surgeon. Please return to the emergency department as needed.

## 2023-11-06 NOTE — TELEPHONE ENCOUNTER
Pulmonary    In order to be decannulated from his trach, he needs an ABG off pressure support/no BiPAP and demonstrate no hypercarbia. He needs a speech evaluation along with a video fluro which has been completed - no aspiration   He needs a nocturnal oximetry to evaluate if he needs nasal cannula - but this step should not slow down the decannulation process    Once we have an ABG that is normal, then he can be decannulated.  He is currently with a Shiley 4 from a Shiley 8 so this should be a rather quick process     Discussed with Opal Grayson MD  Pulmonary and Critical Care Medicine

## 2023-11-06 NOTE — CASE COMMUNICATION
This message is informative only. No action required. Added cognitive linguistic goal to Pts care plan. Impression. Cognitive linguistic skills in the following areas: Moderate. Severe in STM and organization. Moderate in Immediate Memory. Mild. Moderate in Orientation. Too fatigued to test LTM and problem solving. Rec. Add cognitive linguistic skills goal to care plan  Cont Sp tx.  2x wk  Practice assignme nts given

## 2023-11-06 NOTE — RESPIRATORY THERAPY NOTE
Pt received in ED #4, decannulated on NC. Shiley #4CN reinserted without incident. New Trach ties placed. Pt placed on 30% Ventimask at this time. SPO2 94%. Pt stable at this time.

## 2023-11-07 ENCOUNTER — TELEPHONE (OUTPATIENT)
Dept: OTHER | Facility: HOSPITAL | Age: 38
End: 2023-11-07

## 2023-11-07 ENCOUNTER — HOSPITAL ENCOUNTER (OUTPATIENT)
Dept: PULMONOLOGY | Facility: HOSPITAL | Age: 38
Discharge: HOME | End: 2023-11-07
Payer: COMMERCIAL

## 2023-11-07 ENCOUNTER — PATIENT OUTREACH (OUTPATIENT)
Dept: CASE MANAGEMENT | Facility: OTHER | Age: 38
End: 2023-11-07

## 2023-11-07 DIAGNOSIS — E66.2 OBESITY HYPOVENTILATION SYNDROME (HCC): ICD-10-CM

## 2023-11-07 DIAGNOSIS — J96.12 CHRONIC RESPIRATORY FAILURE WITH HYPOXIA AND HYPERCAPNIA (HCC): Primary | ICD-10-CM

## 2023-11-07 DIAGNOSIS — Z93.0 STATUS POST TRACHEOSTOMY (HCC): ICD-10-CM

## 2023-11-07 DIAGNOSIS — J96.11 CHRONIC RESPIRATORY FAILURE WITH HYPOXIA AND HYPERCAPNIA (HCC): Primary | ICD-10-CM

## 2023-11-07 LAB
ARTERIAL PATENCY WRIST A: ABNORMAL
BASE EXCESS BLDA CALC-SCNC: 0 MMOL/L (ref -2–3)
CA-I BLD-SCNC: 1.26 MMOL/L (ref 1.12–1.32)
FIO2 GAS DIL.REBREATH: 21 L
GLUCOSE SERPL-MCNC: 100 MG/DL (ref 65–140)
HCO3 BLDA-SCNC: 25.1 MMOL/L (ref 22–28)
HCT VFR BLD CALC: 38 % (ref 36.5–49.3)
HGB BLDA-MCNC: 12.9 G/DL (ref 12–17)
PCO2 BLD: 26 MMOL/L (ref 21–32)
PCO2 BLD: 40.1 MM HG (ref 36–44)
PH BLD: 7.4 [PH] (ref 7.35–7.45)
PO2 BLD: 66 MM HG (ref 75–129)
POTASSIUM BLD-SCNC: 3.8 MMOL/L (ref 3.5–5.3)
SAMPLE SITE: ABNORMAL
SAO2 % BLD FROM PO2: 93 % (ref 60–85)
SODIUM BLD-SCNC: 136 MMOL/L (ref 136–145)
SPECIMEN SOURCE: ABNORMAL

## 2023-11-07 PROCEDURE — 82803 BLOOD GASES ANY COMBINATION: CPT

## 2023-11-07 PROCEDURE — 85014 HEMATOCRIT: CPT

## 2023-11-07 PROCEDURE — 36600 WITHDRAWAL OF ARTERIAL BLOOD: CPT

## 2023-11-07 PROCEDURE — 82330 ASSAY OF CALCIUM: CPT

## 2023-11-07 PROCEDURE — 84132 ASSAY OF SERUM POTASSIUM: CPT

## 2023-11-07 PROCEDURE — 82947 ASSAY GLUCOSE BLOOD QUANT: CPT

## 2023-11-07 PROCEDURE — 84295 ASSAY OF SERUM SODIUM: CPT

## 2023-11-07 NOTE — TELEPHONE ENCOUNTER
I have been in communication with Dr. Сергей Ortega: Reviewed ABGs he no longer needs oxygen or ventilator at night-attempting to obtain PMV or cap      If there is any way we could call [facility] tomorrow and see if they have a trach Or Passy-Clarke valve and really appreciated

## 2023-11-07 NOTE — PROGRESS NOTES
Patient was seen in the ED 11/6 after he accidentally dislodged his trach tube. Today he had a blood gas done after being off the ventilator last night per Leisa his caregiver. She reports she is waiting for the results. She is very dedicated and caring to [facility]. I was unable to engage patient in care management. I advised her to call me if I can assist her. [facility] continues to have [facility], physical therapy and speech therapy.

## 2023-11-07 NOTE — PROGRESS NOTES
Pulmonary    Reviewed ABG, normal CO2    Lets cap his trach 24 hours a day and see how he tolerates. Would ask speech team for Passy-Clarke valve.     If he tolerates the above interventions, he should be ready for decannulation    Opal Markham MD  Pulmonary and [address]

## 2023-11-08 ENCOUNTER — HOME CARE VISIT (OUTPATIENT)
Dept: HOME HEALTH SERVICES | Facility: HOME HEALTHCARE | Age: 38
End: 2023-11-08
Payer: COMMERCIAL

## 2023-11-08 VITALS — DIASTOLIC BLOOD PRESSURE: 84 MMHG | OXYGEN SATURATION: 88 % | SYSTOLIC BLOOD PRESSURE: 140 MMHG | HEART RATE: 56 BPM

## 2023-11-08 PROCEDURE — G0153 HHCP-SVS OF S/L PATH,EA 15MN: HCPCS

## 2023-11-08 PROCEDURE — G0151 HHCP-SERV OF PT,EA 15 MIN: HCPCS

## 2023-11-09 NOTE — TELEPHONE ENCOUNTER
Pulmonary    I met with Hemanth's sister and mother at [address] [place] [address]. I gave her a Jie. I instructed them to His tracheostomy for the next several days. If he is able to communicate with no issues and does not need to go back on the ventilator, then he would be ready for decannulation. They say that he does require 2 L of oxygen nightly occasionally. I doubt this is CROW at this point, still may have some components of neuromuscular weakness.   This should not preclude him from getting decannulated    Plan  Capping trial 24/7 for the next few days  Order placed for Passy-[place] valve from [facility]  Nocturnal oximetry ordered  Call me next week with results    Opal Markham MD  Pulmonary and [address]

## 2023-11-10 ENCOUNTER — HOME CARE VISIT (OUTPATIENT)
Dept: HOME HEALTH SERVICES | Facility: HOME HEALTHCARE | Age: 38
End: 2023-11-10
Payer: COMMERCIAL

## 2023-11-10 LAB
DME PARACHUTE DELIVERY DATE REQUESTED: NORMAL
DME PARACHUTE ITEM DESCRIPTION: NORMAL
DME PARACHUTE ITEM DESCRIPTION: NORMAL
DME PARACHUTE ORDER STATUS: NORMAL
DME PARACHUTE SUPPLIER NAME: NORMAL
DME PARACHUTE SUPPLIER PHONE: NORMAL

## 2023-11-10 NOTE — PROGRESS NOTES
ORDER PLACED THROUGH [place] FOR AN OVERNIGHT PULSE OXIMETRY AND A TRACHEOSTOMY SPEAKING VALVE. ALL ORDERED THROUGH [facility].

## 2023-11-11 NOTE — CASE COMMUNICATION
This message is informative only. No action required. This SLP arrived at home of Pt at scheduled ST apt.    Sister Porsche Alvarez reported that Pt was   admitted to [facility] due to  "vomiting and balance was off"

## 2023-11-20 ENCOUNTER — TELEPHONE (OUTPATIENT)
Dept: PULMONOLOGY | Facility: CLINIC | Age: 38
End: 2023-11-20

## 2023-11-29 ENCOUNTER — HOME CARE VISIT (OUTPATIENT)
Dept: HOME HEALTH SERVICES | Facility: HOME HEALTHCARE | Age: 38
End: 2023-11-29
Payer: COMMERCIAL

## 2023-12-05 ENCOUNTER — HOME HEALTH ADMISSION (OUTPATIENT)
Dept: HOME HEALTH SERVICES | Facility: HOME HEALTHCARE | Age: 38
End: 2023-12-05
Payer: COMMERCIAL

## 2023-12-06 ENCOUNTER — HOME CARE VISIT (OUTPATIENT)
Dept: HOME HEALTH SERVICES | Facility: HOME HEALTHCARE | Age: 38
End: 2023-12-06

## 2023-12-07 ENCOUNTER — HOME CARE VISIT (OUTPATIENT)
Dept: HOME HEALTH SERVICES | Facility: HOME HEALTHCARE | Age: 38
End: 2023-12-07

## 2023-12-08 ENCOUNTER — HOME CARE VISIT (OUTPATIENT)
Dept: HOME HEALTH SERVICES | Facility: HOME HEALTHCARE | Age: 38
End: 2023-12-08
Payer: COMMERCIAL

## 2023-12-08 VITALS
OXYGEN SATURATION: 97 % | TEMPERATURE: 97.7 F | RESPIRATION RATE: 20 BRPM | HEART RATE: 66 BPM | DIASTOLIC BLOOD PRESSURE: 70 MMHG | SYSTOLIC BLOOD PRESSURE: 110 MMHG

## 2023-12-08 PROCEDURE — 400013 VN SOC

## 2023-12-08 PROCEDURE — G0299 HHS/HOSPICE OF RN EA 15 MIN: HCPCS

## 2023-12-11 ENCOUNTER — HOME CARE VISIT (OUTPATIENT)
Dept: HOME HEALTH SERVICES | Facility: HOME HEALTHCARE | Age: 38
End: 2023-12-11
Payer: COMMERCIAL

## 2023-12-11 ENCOUNTER — APPOINTMENT (EMERGENCY)
Dept: RADIOLOGY | Facility: HOSPITAL | Age: 38
End: 2023-12-11
Payer: COMMERCIAL

## 2023-12-11 ENCOUNTER — HOSPITAL ENCOUNTER (EMERGENCY)
Facility: HOSPITAL | Age: 38
Discharge: HOME | End: 2023-12-11
Attending: EMERGENCY MEDICINE
Payer: COMMERCIAL

## 2023-12-11 VITALS
HEART RATE: 66 BPM | SYSTOLIC BLOOD PRESSURE: 127 MMHG | RESPIRATION RATE: 18 BRPM | OXYGEN SATURATION: 98 % | DIASTOLIC BLOOD PRESSURE: 84 MMHG

## 2023-12-11 DIAGNOSIS — Z43.0 ENCOUNTER FOR TRACHEOSTOMY TUBE CHANGE (HCC): Primary | ICD-10-CM

## 2023-12-11 LAB
ALBUMIN SERPL BCP-MCNC: 4.2 G/DL (ref 3.5–5)
ALP SERPL-CCNC: 117 U/L (ref 34–104)
ALT SERPL W P-5'-P-CCNC: 68 U/L (ref 7–52)
ANION GAP SERPL CALCULATED.3IONS-SCNC: 9 MMOL/L
AST SERPL W P-5'-P-CCNC: 24 U/L (ref 13–39)
BASOPHILS # BLD AUTO: 0.08 THOUSANDS/ÂΜL (ref 0–0.1)
BASOPHILS NFR BLD AUTO: 1 % (ref 0–1)
BILIRUB SERPL-MCNC: 0.4 MG/DL (ref 0.2–1)
BUN SERPL-MCNC: 4 MG/DL (ref 5–25)
CALCIUM SERPL-MCNC: 9.9 MG/DL (ref 8.4–10.2)
CHLORIDE SERPL-SCNC: 100 MMOL/L (ref 96–108)
CO2 SERPL-SCNC: 30 MMOL/L (ref 21–32)
CREAT SERPL-MCNC: 0.94 MG/DL (ref 0.6–1.3)
EOSINOPHIL # BLD AUTO: 0.16 THOUSAND/ÂΜL (ref 0–0.61)
EOSINOPHIL NFR BLD AUTO: 2 % (ref 0–6)
ERYTHROCYTE [DISTWIDTH] IN BLOOD BY AUTOMATED COUNT: 14.3 % (ref 11.6–15.1)
FLUAV RNA RESP QL NAA+PROBE: NEGATIVE
FLUBV RNA RESP QL NAA+PROBE: NEGATIVE
GFR SERPL CREATININE-BSD FRML MDRD: 103 ML/MIN/1.73SQ M
GLUCOSE SERPL-MCNC: 172 MG/DL (ref 65–140)
HCT VFR BLD AUTO: 43.5 % (ref 36.5–49.3)
HGB BLD-MCNC: 13.6 G/DL (ref 12–17)
IMM GRANULOCYTES # BLD AUTO: 0.14 THOUSAND/UL (ref 0–0.2)
IMM GRANULOCYTES NFR BLD AUTO: 1 % (ref 0–2)
LYMPHOCYTES # BLD AUTO: 4.03 THOUSANDS/ÂΜL (ref 0.6–4.47)
LYMPHOCYTES NFR BLD AUTO: 38 % (ref 14–44)
MCH RBC QN AUTO: 30.6 PG (ref 26.8–34.3)
MCHC RBC AUTO-ENTMCNC: 31.3 G/DL (ref 31.4–37.4)
MCV RBC AUTO: 98 FL (ref 82–98)
MONOCYTES # BLD AUTO: 0.87 THOUSAND/ÂΜL (ref 0.17–1.22)
MONOCYTES NFR BLD AUTO: 8 % (ref 4–12)
NEUTROPHILS # BLD AUTO: 5.28 THOUSANDS/ÂΜL (ref 1.85–7.62)
NEUTS SEG NFR BLD AUTO: 50 % (ref 43–75)
NRBC BLD AUTO-RTO: 0 /100 WBCS
PLATELET # BLD AUTO: 333 THOUSANDS/UL (ref 149–390)
PMV BLD AUTO: 9.4 FL (ref 8.9–12.7)
POTASSIUM SERPL-SCNC: 3.4 MMOL/L (ref 3.5–5.3)
PROT SERPL-MCNC: 8 G/DL (ref 6.4–8.4)
RBC # BLD AUTO: 4.45 MILLION/UL (ref 3.88–5.62)
RSV RNA RESP QL NAA+PROBE: NEGATIVE
SARS-COV-2 RNA RESP QL NAA+PROBE: NEGATIVE
SODIUM SERPL-SCNC: 139 MMOL/L (ref 135–147)
WBC # BLD AUTO: 10.56 THOUSAND/UL (ref 4.31–10.16)

## 2023-12-11 PROCEDURE — 36415 COLL VENOUS BLD VENIPUNCTURE: CPT | Performed by: EMERGENCY MEDICINE

## 2023-12-11 PROCEDURE — 94760 N-INVAS EAR/PLS OXIMETRY 1: CPT

## 2023-12-11 PROCEDURE — 99285 EMERGENCY DEPT VISIT HI MDM: CPT | Performed by: EMERGENCY MEDICINE

## 2023-12-11 PROCEDURE — 80053 COMPREHEN METABOLIC PANEL: CPT | Performed by: EMERGENCY MEDICINE

## 2023-12-11 PROCEDURE — 71045 X-RAY EXAM CHEST 1 VIEW: CPT

## 2023-12-11 PROCEDURE — 85025 COMPLETE CBC W/AUTO DIFF WBC: CPT | Performed by: EMERGENCY MEDICINE

## 2023-12-11 PROCEDURE — 99284 EMERGENCY DEPT VISIT MOD MDM: CPT

## 2023-12-11 PROCEDURE — 0241U HB NFCT DS VIR RESP RNA 4 TRGT: CPT | Performed by: EMERGENCY MEDICINE

## 2023-12-11 RX ORDER — AMOXICILLIN AND CLAVULANATE POTASSIUM 400; 57 MG/5ML; MG/5ML
875 POWDER, FOR SUSPENSION ORAL 2 TIMES DAILY
Qty: 152.6 ML | Refills: 0 | Status: SHIPPED | OUTPATIENT
Start: 2023-12-11 | End: 2023-12-18

## 2023-12-11 NOTE — ED NOTES
Resp. At bedside, provider updating family via [id number] Tacho, RN  12/11/23 5083

## 2023-12-11 NOTE — ED PROVIDER NOTES
History  Chief Complaint   Patient presents with   • Tracheostomy Tube Change     Pt displaced trach, 6.5 ET by EMS 17 @lip. Pt coughed out own tube, he is usually on vent at home for  a neuro d/o     41 M presenting by EMS for respiratory arrest.  Pt is paraplegic and has trach. Apparently, he coughed out his 8 Shiley trach and family was unable to replace trach. EMS intubate the trach with 6-0 ET tube. On arrival,  there is blood in the airway and pt hypoxic and coughing. Respiratory immediately at bedside. EMS stating pt is vent-dependent. An 8-0 shiley was attempted but was unable to pass. A 6-0 Shiley was placed. Blood was suction from the airway and lungs. Hypoxia quickly resolved. Prior to Admission Medications   Prescriptions Last Dose Informant Patient Reported? Taking? FLUoxetine (PROzac) 10 mg capsule  Family Member No No   Si capsule (10 mg total) by Per G Tube route daily   Incontinence Supply Disposable (Comfort Shield Adult Diapers) MISC  Family Member No No   Sig: Use 1 each 4 (four) times a day   Melatonin 5 MG/15ML LIQD  Family Member No No   Sig: 15 mL (5 mg total) by Per PEG Tube route daily at bedtime   Oral Hygiene Products (Toothette Swabs/Dentifrice) SWAB  Family Member No No   Sig: Apply to the mouth or throat 3 (three) times a day   Sennosides (senna) 8.8 mg/5 mL oral syrup  Family Member No No   Sig: 10 mL (17.6 mg total) by Per PEG Tube route daily as needed (Constipation)   Patient not taking: Reported on 10/24/2023   acetylcysteine (MUCOMYST) 200 mg/mL nebulizer solution  Family Member No No   Sig: Take 3 mL (600 mg total) by nebulization every 8 (eight) hours Do not start before 2023.    albuterol (2.5 mg/3 mL) 0.083 % nebulizer solution   No No   Sig: Take 3 mL (2.5 mg total) by nebulization every 8 (eight) hours   apixaban (ELIQUIS) 5 mg  Family Member No No   Si tablet (5 mg total) by Per PEG Tube route 2 (two) times a day   Patient taking differently: 5 mg by Per PEG Tube route 2 (two) times a day. Pt taking orally  Indications: Blockage of Blood Vessel to Lung by a Particle   cholecalciferol (VITAMIN D3) 1,000 units tablet  Family Member No No   Si tablet (1,000 Units total) by Per G Tube route daily   oxygen gas  Family Member Yes No   Sig: Inhale 2 L/min as needed   polyethylene glycol (MIRALAX) 17 g packet  Family Member No No   Si g by Per PEG Tube route daily as needed (constipation)   thiamine 100 MG tablet  Family Member No No   Si tablet (100 mg total) by Per G Tube route daily   Patient not taking: Reported on 10/24/2023      Facility-Administered Medications: None       Past Medical History:   Diagnosis Date   • Anxiety    • Cancer [facility])    • Depression    • Migration of percutaneous endoscopic gastrostomy (PEG) tube ([address]) 2023   • Psychiatric disorder     bipolar       Past Surgical History:   Procedure Laterality Date   • IR BIOPSY LYMPH NODE  2023   • IR GASTROSTOMY TUBE PLACEMENT  2023   • IR LUMBAR PUNCTURE  2023   • IR PORT PLACEMENT  3/14/2023   • ORCHIECTOMY Left 2022    Procedure: ORCHIECTOMY INGUINAL;  Surgeon: Zen Starks MD;  Location:  MAIN OR;  Service: Urology   • PEG W/TRACHEOSTOMY PLACEMENT N/A 2023    Procedure: TRACHEOSTOMY WITH INSERTION PEG TUBE;  Surgeon: Tawana Venegas MD;  Location: [facility] OR;  Service: General       Family History   Problem Relation Age of Onset   • Coronary artery disease Maternal Aunt      I have reviewed and agree with the history as documented.     E-Cigarette/Vaping   • E-Cigarette Use Never User      E-Cigarette/Vaping Substances   • Nicotine No    • THC Yes    • CBD No    • Flavoring No    • Other No    • Unknown No      Social History     Tobacco Use   • Smoking status: Former     Packs/day: 0.50     Years: 5.00     Total pack years: 2.50     Types: Cigarettes     Start date: 2008   • Smokeless tobacco: Never   Vaping Use   • Vaping Use: Never used   Substance Use Topics   • Alcohol use: Not Currently     Comment: 5 years ago   • Drug use: Not Currently     Types: Marijuana     Comment: Medical marijuana       Review of Systems   Unable to perform ROS: Patient nonverbal       Physical Exam  Physical Exam  Constitutional:       General: He is in acute distress. Appearance: He is obese. HENT:      Mouth/Throat:      Mouth: Mucous membranes are moist.   Neck:      Comments: Trach site with blood. Cardiovascular:      Rate and Rhythm: Normal rate. Pulmonary:      Effort: Respiratory distress present. Breath sounds: Rhonchi present. Neurological:      Mental Status: Mental status is at baseline.          Vital Signs  ED Triage Vitals [12/11/23 0849]   Temp Pulse Resp Blood Pressure SpO2   -- 88 -- 150/70 (!) 85 %      Temp src Heart Rate Source Patient Position - Orthostatic VS BP Location FiO2 (%)   -- Monitor Lying Left arm --      Pain Score       --           Vitals:    12/11/23 0849   BP: 150/70   Pulse: 88   Patient Position - Orthostatic VS: Lying         Visual Acuity      ED Medications  Medications - No data to display    Diagnostic Studies  Results Reviewed       Procedure Component Value Units Date/Time    FLU/RSV/COVID - if FLU/RSV clinically relevant [[id number]]     Lab Status: No result Specimen: Nares from Nose     CBC and differential [[id number]]     Lab Status: No result Specimen: Blood     Comprehensive metabolic panel [[id number]]     Lab Status: No result Specimen: Blood                    XR chest 1 view portable    (Results Pending)              Procedures  ECG 12 Lead Documentation Only    Date/Time: 12/11/2023 9:07 AM    Performed by: Francisco Mcnally DO  Authorized by: Francisco Mcnally DO    Indications / Diagnosis:  Respiratory distress  Previous ECG:     Comparison to cardiac monitor: Yes    Interpretation:     Interpretation: abnormal    Rate:     ECG rate:  88    ECG rate assessment: normal    Rhythm: Rhythm: sinus rhythm    Ectopy:     Ectopy: none    QRS:     QRS axis:  Normal  Conduction:     Conduction: normal    ST segments:     ST segments:  Abnormal    Depression:  V4, V5 and V6  T waves:     T waves: flattening      Flattening:  V4, V5 and V6           ED Course  ED Course as of 12/11/23 1417   Mon Dec 11, 2023   1027 Case discussed with Dallinmicah Cannon who feels comfortable taking him home. CXR shows low lung volumes. Questionable pna. No fever or elevated WBC. Will start augmentin                                             Medical Decision Making  41 M presenting with significant respiratory distress. He coughed out his trach tubing and family and EMS unable to replace it. EMS intubated trach with 6-0 ET tube and he presented hypoxic while EMS was bagging. A 6-0 shiley was placed and deep suctioning performed. Pt placed on ventilator and hypoxia quickly resolved. Amount and/or Complexity of Data Reviewed  Labs: ordered. Radiology: ordered. Disposition  Final diagnoses:   None     ED Disposition       None          Follow-up Information    None         Patient's Medications   Discharge Prescriptions    No medications on file       No discharge procedures on file.     PDMP Review       None            ED Provider  Electronically Signed by             Ishmael Clemente DO  12/11/23 0141

## 2023-12-11 NOTE — DISCHARGE INSTRUCTIONS
Please return if you develop any worsening symptoms. You may return at any time if you have any further concerns. Please follow up with your doctor in the next few days.

## 2023-12-11 NOTE — ED NOTES
Respiratory therapist, Dr. Aurora Simpson and this RN to bedside. Preoxygenation provided, Dr. Aurora Simpson inserted new 6.5 shiley and Respiratory hooked patient up to ventilator.       Prisca Magaña, RN  12/11/23 6172

## 2023-12-12 ENCOUNTER — PATIENT OUTREACH (OUTPATIENT)
Dept: FAMILY MEDICINE CLINIC | Facility: CLINIC | Age: 38
End: 2023-12-12

## 2023-12-12 ENCOUNTER — HOME CARE VISIT (OUTPATIENT)
Dept: HOME HEALTH SERVICES | Facility: HOME HEALTHCARE | Age: 38
End: 2023-12-12
Payer: COMMERCIAL

## 2023-12-12 PROCEDURE — G0299 HHS/HOSPICE OF RN EA 15 MIN: HCPCS

## 2023-12-13 VITALS
OXYGEN SATURATION: 98 % | TEMPERATURE: 97.7 F | HEART RATE: 76 BPM | DIASTOLIC BLOOD PRESSURE: 80 MMHG | RESPIRATION RATE: 16 BRPM | SYSTOLIC BLOOD PRESSURE: 130 MMHG

## 2023-12-14 ENCOUNTER — HOME CARE VISIT (OUTPATIENT)
Dept: HOME HEALTH SERVICES | Facility: HOME HEALTHCARE | Age: 38
End: 2023-12-14
Payer: COMMERCIAL

## 2023-12-14 ENCOUNTER — TELEPHONE (OUTPATIENT)
Dept: PULMONOLOGY | Facility: CLINIC | Age: 38
End: 2023-12-14

## 2023-12-14 VITALS
HEART RATE: 73 BPM | SYSTOLIC BLOOD PRESSURE: 118 MMHG | TEMPERATURE: 97.8 F | DIASTOLIC BLOOD PRESSURE: 78 MMHG | OXYGEN SATURATION: 98 % | RESPIRATION RATE: 18 BRPM

## 2023-12-14 PROCEDURE — G0299 HHS/HOSPICE OF RN EA 15 MIN: HCPCS

## 2023-12-14 PROCEDURE — G0151 HHCP-SERV OF PT,EA 15 MIN: HCPCS

## 2023-12-14 NOTE — CASE COMMUNICATION
I spoke to the patient's mother and faxed Dr. Sander Sawyer regarding the delay in initiating ST within 7 days. Anticipated date to begin is 12/22/23 or sooner. ST to call to schedule the visit.

## 2023-12-14 NOTE — TELEPHONE ENCOUNTER
Charley from [facility] is calling because they are looking for a script for new trach supplies. Patient was released from [facility] on Monday. Size of the tube is 6cn 75H.

## 2023-12-17 VITALS — OXYGEN SATURATION: 98 % | DIASTOLIC BLOOD PRESSURE: 78 MMHG | HEART RATE: 73 BPM | SYSTOLIC BLOOD PRESSURE: 118 MMHG

## 2023-12-18 ENCOUNTER — HOME CARE VISIT (OUTPATIENT)
Dept: HOME HEALTH SERVICES | Facility: HOME HEALTHCARE | Age: 38
End: 2023-12-18
Payer: COMMERCIAL

## 2023-12-18 PROCEDURE — G0153 HHCP-SVS OF S/L PATH,EA 15MN: HCPCS

## 2023-12-19 ENCOUNTER — HOME CARE VISIT (OUTPATIENT)
Dept: HOME HEALTH SERVICES | Facility: HOME HEALTHCARE | Age: 38
End: 2023-12-19
Payer: COMMERCIAL

## 2023-12-19 ENCOUNTER — TELEPHONE (OUTPATIENT)
Dept: HOME HEALTH SERVICES | Facility: HOME HEALTHCARE | Age: 38
End: 2023-12-19

## 2023-12-19 VITALS — SYSTOLIC BLOOD PRESSURE: 128 MMHG | OXYGEN SATURATION: 97 % | DIASTOLIC BLOOD PRESSURE: 68 MMHG | HEART RATE: 90 BPM

## 2023-12-19 PROCEDURE — G0151 HHCP-SERV OF PT,EA 15 MIN: HCPCS

## 2023-12-19 NOTE — CASE COMMUNICATION
Clinical swallow evaluation completed 12/18/23.  As per VFSS recommendations, pt assessed this date eating while on trach collar.  Pt appears to tolerate a minced/moist diet and thin liquids with cues related to impulsivity for self feeding.  Trialed advanced textures, however, pt appeared to fatigue quickly with notable decline in O2 sats.    Pt will benefit from speech therapy services 2x week to address dysphagia/po tolerance.  Recom mend:  Minced/moist diet and thin liquids with aspiration precautions.  Pt should be fully alert, off the vent/on trach collar, seated upright for all po.  Frequent, thorough oral care.  Assess for s/s aspiration

## 2023-12-21 ENCOUNTER — HOME CARE VISIT (OUTPATIENT)
Dept: HOME HEALTH SERVICES | Facility: HOME HEALTHCARE | Age: 38
End: 2023-12-21
Payer: COMMERCIAL

## 2023-12-21 VITALS — OXYGEN SATURATION: 94 % | HEART RATE: 82 BPM

## 2023-12-21 PROCEDURE — G0151 HHCP-SERV OF PT,EA 15 MIN: HCPCS

## 2023-12-22 ENCOUNTER — HOME CARE VISIT (OUTPATIENT)
Dept: HOME HEALTH SERVICES | Facility: HOME HEALTHCARE | Age: 38
End: 2023-12-22
Payer: COMMERCIAL

## 2023-12-22 VITALS
OXYGEN SATURATION: 98 % | DIASTOLIC BLOOD PRESSURE: 78 MMHG | HEART RATE: 64 BPM | TEMPERATURE: 97.8 F | RESPIRATION RATE: 18 BRPM | SYSTOLIC BLOOD PRESSURE: 130 MMHG

## 2023-12-22 PROCEDURE — G0153 HHCP-SVS OF S/L PATH,EA 15MN: HCPCS

## 2023-12-22 PROCEDURE — G0299 HHS/HOSPICE OF RN EA 15 MIN: HCPCS

## 2023-12-23 NOTE — CASE COMMUNICATION
Pt's family reports suspected aspiration event this morning after nurse visit. While drinking coffee (on trach collar/off vent), pt reportedly began coughing until he eventually spit up coffee.  Sister Dmitry reports that she suctioned him and then put him back on the vent.  PCP Genaro Helm from  notified via VM at 12:20.    Instructed family in aspiration precautions and feeding strategies.  Discussed at length that pt should onl y take po when off the vent on trach collar as this was how he was assessed by VFSS for current recommendations.  Sister and niece verbalize understanding.  Written precautions/instruction left in home.  Family to monitor for s/s aspiration and notify medical personnel with further concerns or any decline in status.

## 2023-12-26 ENCOUNTER — HOME CARE VISIT (OUTPATIENT)
Dept: HOME HEALTH SERVICES | Facility: HOME HEALTHCARE | Age: 38
End: 2023-12-26
Payer: COMMERCIAL

## 2023-12-26 VITALS
RESPIRATION RATE: 16 BRPM | SYSTOLIC BLOOD PRESSURE: 122 MMHG | DIASTOLIC BLOOD PRESSURE: 82 MMHG | HEART RATE: 65 BPM | TEMPERATURE: 97.8 F | OXYGEN SATURATION: 96 %

## 2023-12-26 PROCEDURE — G0299 HHS/HOSPICE OF RN EA 15 MIN: HCPCS

## 2023-12-27 ENCOUNTER — HOME CARE VISIT (OUTPATIENT)
Dept: HOME HEALTH SERVICES | Facility: HOME HEALTHCARE | Age: 38
End: 2023-12-27
Payer: COMMERCIAL

## 2023-12-27 VITALS — SYSTOLIC BLOOD PRESSURE: 116 MMHG | HEART RATE: 66 BPM | OXYGEN SATURATION: 98 % | DIASTOLIC BLOOD PRESSURE: 76 MMHG

## 2023-12-27 PROCEDURE — G0151 HHCP-SERV OF PT,EA 15 MIN: HCPCS

## 2023-12-29 ENCOUNTER — HOME CARE VISIT (OUTPATIENT)
Dept: HOME HEALTH SERVICES | Facility: HOME HEALTHCARE | Age: 38
End: 2023-12-29
Payer: COMMERCIAL

## 2023-12-29 PROCEDURE — G0153 HHCP-SVS OF S/L PATH,EA 15MN: HCPCS

## 2023-12-30 NOTE — CASE COMMUNICATION
Left a message with Alyssa at PCP Salvador Helm's office re: aspiration concerns as pt is currently back on the vent almost exclusively and eating while on the vent.  Previous diet recommendations were made based on VFSS completed on trach collar trials (not on vent, cuff deflated) and pt noted to desat while eating/drinking today while on vent. Pt has a PEG tube but no tube feeding orders or formula.  Pt reports that he does not wish to  be nourished via PEG although his sister reports she has been flushing it and providing meds via tube.  Requested script for repeat VBS to be completed while on vent to r/o aspiration and for updated po recommendations.  Provided Alyssa at Salvador Helm's office the central scheduling fax number for script. All of the above was discussed with pt/sister Dmitry.

## 2023-12-31 ENCOUNTER — HOSPITAL ENCOUNTER (EMERGENCY)
Facility: HOSPITAL | Age: 38
Discharge: HOME | End: 2023-12-31
Attending: EMERGENCY MEDICINE | Admitting: EMERGENCY MEDICINE
Payer: COMMERCIAL

## 2023-12-31 VITALS
RESPIRATION RATE: 24 BRPM | TEMPERATURE: 98.2 F | OXYGEN SATURATION: 100 % | SYSTOLIC BLOOD PRESSURE: 145 MMHG | BODY MASS INDEX: 35.71 KG/M2 | HEART RATE: 86 BPM | DIASTOLIC BLOOD PRESSURE: 81 MMHG | HEIGHT: 76 IN | WEIGHT: 293.21 LBS

## 2023-12-31 DIAGNOSIS — Z43.0 ENCOUNTER FOR ATTENTION TO TRACHEOSTOMY (HCC): Primary | ICD-10-CM

## 2023-12-31 PROCEDURE — 99283 EMERGENCY DEPT VISIT LOW MDM: CPT | Performed by: EMERGENCY MEDICINE

## 2023-12-31 PROCEDURE — 99283 EMERGENCY DEPT VISIT LOW MDM: CPT

## 2024-01-01 NOTE — RESPIRATORY THERAPY NOTE
Patient came in ER with obstructed trach on home vent.  Able to pass in line suction without complications. Changed inner cannula and in line suction catheter.  Replaced dressing around trach. Pt on home vent setting and volumes no change. Pt RR 20 equal breath sounds. Spo2 100% Placed HME in line since pt didn't have one.  No complications. Tolerated well. Talk to care giver to see if pt had heater at home for vent.  Care giver stated issues with receiving supplies from home care company.    .

## 2024-01-01 NOTE — ED NOTES
Respiratory at bedside.     Brittani Gonzales RN  12/31/23 1932     Hatchet Flap Text: The defect edges were debeveled with a #15 scalpel blade.  Given the location of the defect, shape of the defect and the proximity to free margins a hatchet flap was deemed most appropriate.  Using a sterile surgical marker, an appropriate hatchet flap was drawn incorporating the defect and placing the expected incisions within the relaxed skin tension lines where possible.    The area thus outlined was incised deep to adipose tissue with a #15 scalpel blade.  The skin margins were undermined to an appropriate distance in all directions utilizing iris scissors.

## 2024-01-01 NOTE — ED PROVIDER NOTES
History  Chief Complaint   Patient presents with    Tracheostomy Tube Evaluation     Niece is stating patient was coughing and suction was very difficult to suction.      Patient is a 38-year-old male seen in the emergency department brought by EMS with concern for difficulty suctioning tracheostomy.  Family explains that patient last had trach changed approximately 1 month ago.  Family notes no fever for the patient, but is requesting change of suction apparatus.         Prior to Admission Medications   Prescriptions Last Dose Informant Patient Reported? Taking?   FLUoxetine (PROzac) 10 mg capsule  Family Member No No   Si capsule (10 mg total) by Per G Tube route daily   Incontinence Supply Disposable (Comfort Shield Adult Diapers) MISC  Family Member No No   Sig: Use 1 each 4 (four) times a day   Melatonin 5 MG/15ML LIQD  Family Member No No   Sig: 15 mL (5 mg total) by Per PEG Tube route daily at bedtime   Oral Hygiene Products (Toothette Swabs/Dentifrice) SWAB  Family Member No No   Sig: Apply to the mouth or throat 3 (three) times a day   Sennosides (senna) 8.8 mg/5 mL oral syrup  Family Member No No   Sig: 10 mL (17.6 mg total) by Per PEG Tube route daily as needed (Constipation)   Patient not taking: Reported on 10/24/2023   acetylcysteine (MUCOMYST) 200 mg/mL nebulizer solution  Family Member No No   Sig: Take 3 mL (600 mg total) by nebulization every 8 (eight) hours Do not start before 2023.   albuterol (2.5 mg/3 mL) 0.083 % nebulizer solution   No No   Sig: Take 3 mL (2.5 mg total) by nebulization every 8 (eight) hours   apixaban (ELIQUIS) 5 mg  Family Member No No   Si tablet (5 mg total) by Per PEG Tube route 2 (two) times a day   Patient taking differently: 5 mg by Per PEG Tube route 2 (two) times a day. Pt taking orally  Indications: Blockage of Blood Vessel to Lung by a Particle   cholecalciferol (VITAMIN D3) 1,000 units tablet  Family Member No No   Si tablet (1,000 Units  total) by Per G Tube route daily   oxygen gas  Family Member Yes No   Sig: Inhale 2 L/min as needed   polyethylene glycol (MIRALAX) 17 g packet  Family Member No No   Si g by Per PEG Tube route daily as needed (constipation)   thiamine 100 MG tablet  Family Member No No   Si tablet (100 mg total) by Per G Tube route daily   Patient not taking: Reported on 10/24/2023      Facility-Administered Medications: None       Past Medical History:   Diagnosis Date    Anxiety     Cancer (HCC)     Depression     Migration of percutaneous endoscopic gastrostomy (PEG) tube (HCC) 2023    Psychiatric disorder     bipolar       Past Surgical History:   Procedure Laterality Date    IR BIOPSY LYMPH NODE  2023    IR GASTROSTOMY TUBE PLACEMENT  2023    IR LUMBAR PUNCTURE  2023    IR PORT PLACEMENT  3/14/2023    ORCHIECTOMY Left 2022    Procedure: ORCHIECTOMY INGUINAL;  Surgeon: Flip Michael MD;  Location:  MAIN OR;  Service: Urology    PEG W/TRACHEOSTOMY PLACEMENT N/A 2023    Procedure: TRACHEOSTOMY WITH INSERTION PEG TUBE;  Surgeon: Rudy Mcmanus MD;  Location: [facility];  Service: General       Family History   Problem Relation Age of Onset    Coronary artery disease Maternal Aunt      I have reviewed and agree with the history as documented.    E-Cigarette/Vaping    E-Cigarette Use Never User      E-Cigarette/Vaping Substances    Nicotine No     THC Yes     CBD No     Flavoring No     Other No     Unknown No      Social History     Tobacco Use    Smoking status: Former     Current packs/day: 0.50     Average packs/day: 0.5 packs/day for 16.0 years (8.0 ttl pk-yrs)     Types: Cigarettes     Start date: 2008    Smokeless tobacco: Never   Vaping Use    Vaping status: Never Used   Substance Use Topics    Alcohol use: Not Currently     Comment: 5 years ago    Drug use: Not Currently     Types: Marijuana     Comment: Medical marijuana       Review of Systems   Unable to perform ROS:  Patient nonverbal       Physical Exam  Physical Exam  Vitals and nursing note reviewed.   Constitutional:       General: He is not in acute distress.     Appearance: He is well-developed.   HENT:      Head: Normocephalic and atraumatic.      Right Ear: External ear normal.      Left Ear: External ear normal.      Nose: Nose normal.      Mouth/Throat:      Comments: Trach in place, clean/dry, with small amount of mucus noted in suction apparatus  Eyes:      General: No scleral icterus.     Conjunctiva/sclera: Conjunctivae normal.   Cardiovascular:      Rate and Rhythm: Normal rate and regular rhythm.      Heart sounds: No murmur heard.  Pulmonary:      Effort: Pulmonary effort is normal. No respiratory distress.      Breath sounds: Normal breath sounds.   Abdominal:      Palpations: Abdomen is soft.      Tenderness: There is no abdominal tenderness.   Musculoskeletal:         General: No deformity or signs of injury.      Cervical back: Normal range of motion and neck supple.   Skin:     General: Skin is warm and dry.      Capillary Refill: Capillary refill takes less than 2 seconds.   Neurological:      Mental Status: He is alert. Mental status is at baseline.      Cranial Nerves: No cranial nerve deficit.   Psychiatric:         Mood and Affect: Mood normal.         Vital Signs  ED Triage Vitals [12/31/23 1910]   Temperature Pulse Respirations Blood Pressure SpO2   98.2 °F (36.8 °C) 86 (!) 24 145/81 100 %      Temp Source Heart Rate Source Patient Position - Orthostatic VS BP Location FiO2 (%)   Oral -- Sitting Left arm --      Pain Score       No Pain           Vitals:    12/31/23 1910   BP: 145/81   Pulse: 86   Patient Position - Orthostatic VS: Sitting         Visual Acuity      ED Medications  Medications - No data to display    Diagnostic Studies  Results Reviewed       None                   No orders to display              Procedures  Procedures         ED Course                               SBIRT 20yo+       Flowsheet Row Most Recent Value   Initial Alcohol Screen: US AUDIT-C     1. How often do you have a drink containing alcohol? 0 Filed at: 12/31/2023 1914   2. How many drinks containing alcohol do you have on a typical day you are drinking?  0 Filed at: 12/31/2023 1914   3a. Male UNDER 65: How often do you have five or more drinks on one occasion? 0 Filed at: 12/31/2023 1914   3b. FEMALE Any Age, or MALE 65+: How often do you have 4 or more drinks on one occassion? 0 Filed at: 12/31/2023 1914   Audit-C Score 0 Filed at: 12/31/2023 1914                      Medical Decision Making  Patient is a 38-year-old male with concern for difficulty suctioning patient, request to change suction apparatus.  Patient is afebrile emergency department, with normal oxygen saturation noted on evaluation.  Suction apparatus was exchanged by respiratory therapy, and patient was provided new supplies.  There is no evidence of acute infection on evaluation.  Plan to have patient follow up with PCP/outpatient providers.  Patient stable for discharge home.  Discharge instructions were reviewed with family.    Problems Addressed:  Encounter for attention to tracheostomy (HCC): acute illness or injury             Disposition  Final diagnoses:   Encounter for attention to tracheostomy (HCC)     Time reflects when diagnosis was documented in both MDM as applicable and the Disposition within this note       Time User Action Codes Description Comment    12/31/2023  7:12 PM Bashir Watts Add [J95.00] Tracheostomy complication (HCC)     12/31/2023  7:14 PM Bashir Watts Add [Z43.0] Encounter for attention to tracheostomy (HCC)     12/31/2023  7:14 PM Bashir Watts Modify [Z43.0] Encounter for attention to tracheostomy (HCC)     12/31/2023  7:14 PM Bashir Watts Remove [J95.00] Tracheostomy complication (HCC)           ED Disposition       ED Disposition   Discharge    Condition   Stable    Date/Time   Sun Dec 31, 2023 1911    Comment    Hemanth Swanson discharge to home/self care.                   Follow-up Information       Follow up With Specialties Details Why Contact Info    Dora Lee MD Family Medicine Call in 1 day  [address] [facility]  [address] [place], [place] [id number] [phone]              Patient's Medications   Discharge Prescriptions    No medications on file       No discharge procedures on file.    PDMP Review       None            ED Provider  Electronically Signed by             Bashir Watts MD  12/31/23 1958

## 2024-01-01 NOTE — ED NOTES
Pt's family was able to find transport home, and has arrived. Pt stood and transferred to wheel chair. Pt assisted into family vehicle with assistance from family members.     Ankush Hernández RN  12/31/23 2122

## 2024-01-03 ENCOUNTER — HOSPITAL ENCOUNTER (EMERGENCY)
Facility: HOSPITAL | Age: 39
Discharge: HOME | End: 2024-01-03
Attending: EMERGENCY MEDICINE
Payer: COMMERCIAL

## 2024-01-03 ENCOUNTER — HOME CARE VISIT (OUTPATIENT)
Dept: HOME HEALTH SERVICES | Facility: HOME HEALTHCARE | Age: 39
End: 2024-01-03
Payer: COMMERCIAL

## 2024-01-03 ENCOUNTER — APPOINTMENT (EMERGENCY)
Dept: CT IMAGING | Facility: HOSPITAL | Age: 39
End: 2024-01-03
Payer: COMMERCIAL

## 2024-01-03 VITALS
TEMPERATURE: 98.2 F | RESPIRATION RATE: 16 BRPM | BODY MASS INDEX: 35.69 KG/M2 | HEART RATE: 58 BPM | SYSTOLIC BLOOD PRESSURE: 125 MMHG | HEIGHT: 76 IN | OXYGEN SATURATION: 94 % | DIASTOLIC BLOOD PRESSURE: 81 MMHG

## 2024-01-03 DIAGNOSIS — R41.82 CHANGE IN MENTAL STATUS: Primary | ICD-10-CM

## 2024-01-03 LAB
ANION GAP SERPL CALCULATED.3IONS-SCNC: 6 MMOL/L
BASE EX.OXY STD BLDV CALC-SCNC: 57 % (ref 60–80)
BASE EXCESS BLDV CALC-SCNC: 2.6 MMOL/L
BASOPHILS # BLD AUTO: 0.04 THOUSANDS/ÂΜL (ref 0–0.1)
BASOPHILS NFR BLD AUTO: 0 % (ref 0–1)
BUN SERPL-MCNC: 5 MG/DL (ref 5–25)
CALCIUM SERPL-MCNC: 9.2 MG/DL (ref 8.4–10.2)
CHLORIDE SERPL-SCNC: 96 MMOL/L (ref 96–108)
CO2 SERPL-SCNC: 32 MMOL/L (ref 21–32)
CREAT SERPL-MCNC: 0.88 MG/DL (ref 0.6–1.3)
EOSINOPHIL # BLD AUTO: 0.1 THOUSAND/ÂΜL (ref 0–0.61)
EOSINOPHIL NFR BLD AUTO: 1 % (ref 0–6)
ERYTHROCYTE [DISTWIDTH] IN BLOOD BY AUTOMATED COUNT: 14.6 % (ref 11.6–15.1)
GFR SERPL CREATININE-BSD FRML MDRD: 110 ML/MIN/1.73SQ M
GLUCOSE SERPL-MCNC: 129 MG/DL (ref 65–140)
HCO3 BLDV-SCNC: 30.1 MMOL/L (ref 24–30)
HCT VFR BLD AUTO: 39.5 % (ref 36.5–49.3)
HGB BLD-MCNC: 12.6 G/DL (ref 12–17)
IMM GRANULOCYTES # BLD AUTO: 0.04 THOUSAND/UL (ref 0–0.2)
IMM GRANULOCYTES NFR BLD AUTO: 0 % (ref 0–2)
LYMPHOCYTES # BLD AUTO: 1.71 THOUSANDS/ÂΜL (ref 0.6–4.47)
LYMPHOCYTES NFR BLD AUTO: 17 % (ref 14–44)
MCH RBC QN AUTO: 29.9 PG (ref 26.8–34.3)
MCHC RBC AUTO-ENTMCNC: 31.9 G/DL (ref 31.4–37.4)
MCV RBC AUTO: 94 FL (ref 82–98)
MONOCYTES # BLD AUTO: 0.49 THOUSAND/ÂΜL (ref 0.17–1.22)
MONOCYTES NFR BLD AUTO: 5 % (ref 4–12)
NEUTROPHILS # BLD AUTO: 7.76 THOUSANDS/ÂΜL (ref 1.85–7.62)
NEUTS SEG NFR BLD AUTO: 77 % (ref 43–75)
NRBC BLD AUTO-RTO: 0 /100 WBCS
O2 CT BLDV-SCNC: 10.9 ML/DL
PCO2 BLDV: 59.5 MM HG (ref 42–50)
PH BLDV: 7.32 [PH] (ref 7.3–7.4)
PLATELET # BLD AUTO: 244 THOUSANDS/UL (ref 149–390)
PMV BLD AUTO: 9.1 FL (ref 8.9–12.7)
PO2 BLDV: 34.4 MM HG (ref 35–45)
POTASSIUM SERPL-SCNC: 3.9 MMOL/L (ref 3.5–5.3)
RBC # BLD AUTO: 4.22 MILLION/UL (ref 3.88–5.62)
SODIUM SERPL-SCNC: 134 MMOL/L (ref 135–147)
WBC # BLD AUTO: 10.14 THOUSAND/UL (ref 4.31–10.16)

## 2024-01-03 PROCEDURE — 85025 COMPLETE CBC W/AUTO DIFF WBC: CPT | Performed by: EMERGENCY MEDICINE

## 2024-01-03 PROCEDURE — 94664 DEMO&/EVAL PT USE INHALER: CPT

## 2024-01-03 PROCEDURE — 36415 COLL VENOUS BLD VENIPUNCTURE: CPT | Performed by: EMERGENCY MEDICINE

## 2024-01-03 PROCEDURE — 99285 EMERGENCY DEPT VISIT HI MDM: CPT

## 2024-01-03 PROCEDURE — 80048 BASIC METABOLIC PNL TOTAL CA: CPT | Performed by: EMERGENCY MEDICINE

## 2024-01-03 PROCEDURE — 82805 BLOOD GASES W/O2 SATURATION: CPT | Performed by: EMERGENCY MEDICINE

## 2024-01-03 PROCEDURE — 70450 CT HEAD/BRAIN W/O DYE: CPT

## 2024-01-03 PROCEDURE — 93005 ELECTROCARDIOGRAM TRACING: CPT

## 2024-01-03 PROCEDURE — 99285 EMERGENCY DEPT VISIT HI MDM: CPT | Performed by: EMERGENCY MEDICINE

## 2024-01-03 NOTE — RESPIRATORY THERAPY NOTE
Documented on Home vent settings, actuals, and alarms to show patient stable and comfortable on vent until admitted to impatient ICU.    01/03/24 1815   Respiratory Assessment   Assessment Type Assess only   General Appearance Alert;Awake   Respiratory Pattern Normal   Chest Assessment Chest expansion symmetrical   Resp Comments (S)  Patient has a trach and is on a vent 24/7 settings on vent is , Rate 18, Peep +6, flow trigger 2, I-time .9. Patient has 6L oxygen bleed into ventilator. Patient has a 6 shiley cuffed. Patient is comfortable and stable on home vent. If patient were to be admitted per policy patient needs to go to ICU and be placed on respiratory ventilator. Changed inner cannula. Additional size 6 shiley and 4 shiley cuffed at bedside with one additional size 6 inner cannula at bedside. Patient chart says he takes albuterol Q6 and mucomyst Q6. Per patient he does not take them. Vent check preformed on patients home ventilator, all settings/ actuals, and alarms per set prior to arrival.   O2 Device 6L vent   Vent Information   Vent ID   (patients home vent)   Vent type (S)  Hook G5  (Patient on home vent, Selected to document patient being on ventilator currently.)   Hook C3/G5 Vent Mode (S)CMV   (S)CMV Settings   Resp Rate (BPM) 18 BPM   VT (mL) 500 mL   FIO2 (%) 46 %   PEEP (cmH2O) 6 cmH2O   Insp Time (%) 0.9 %   Flow Trigger (LPM) 2   (S)CMV Actuals   Resp Rate (BPM) 18 BPM   VT (mL) 428   MV 8.4   MAP (cmH2O) 10.3 cmH2O   Peak Pressure (cmH2O) 28.8 cmH2O   I:E Ratio (Obs) 1:2.7   (S)CMV Alarms   High Peak Pressure (cmH2O) 90   Low Pressure (cmH2O) 7 cm H2O   High Resp Rate (BPM) 60 BPM   Low Resp Rate (BPM)   (off)   High MV (L/min)   (off)   Low MV (L/min) 1 L/min   High VT (mL)   (off)   Low VT (mL)   (off)   Apnea Time (s) 20 S   Maintenance   Alarm (pink) cable attached No   Resuscitation bag with peep valve at bedside Yes   Water bag changed No   Circuit changed No   Adult IBW/VT  "Calculations   Height 6' 4\" (1.93 m)   IBW (Ideal Body Weight) 86.8 kg   Range VT 6 ML/Kg 520.8 mL/kg   Range VT 8 ML/Kg 694.4 mL/kg   Surgical Airway Shiley Cuffed   Placement Date/Time: 12/11/23 0850   Tube Size: 6.5 mm  Placed By: (c) Other (Comment)  Type: Tracheostomy  Brand: Jie  Style: Cuffed   Status Cuff Inflated   Site Assessment Clean;Dry   Site Care Open to air   Inner Cannula Care Changed/new   Ties Assessment Intact;Secure   Surgical Airway Cuff Pressure (cm H20)   (MLT)   Equipment at bedside BVM;Additional complete same size trach tube;Additional complete one size smaller trach tube;Obturator;Sterile saline;Additional inner cannula  (Size 6 shiley and size 4 cuffed bedside)       "

## 2024-01-03 NOTE — RESPIRATORY THERAPY NOTE
RT Protocol Note  Hemanth Swanson 38 y.o. male MRN: [id number]  Unit/Bed#: ED-03 Encounter: [id number]    Assessment    Active Problems:  There are no active Hospital Problems.      Home Pulmonary Medications:  Albuterol Q8 and mucomyst Q8. (Per patient not taking)  Home Devices/Therapy: Ventilator    Past Medical History:   Diagnosis Date    Anxiety     Cancer (HCC)     Depression     Migration of percutaneous endoscopic gastrostomy (PEG) tube (HCC) 09/24/2023    Psychiatric disorder     bipolar     Social History     Socioeconomic History    Marital status: Single     Spouse name: None    Number of children: None    Years of education: None    Highest education level: None   Occupational History    None   Tobacco Use    Smoking status: Former     Current packs/day: 0.50     Average packs/day: 0.5 packs/day for 16.0 years (8.0 ttl pk-yrs)     Types: Cigarettes     Start date: 1/1/2008    Smokeless tobacco: Never   Vaping Use    Vaping status: Never Used   Substance and Sexual Activity    Alcohol use: Not Currently     Comment: 5 years ago    Drug use: Not Currently     Types: Marijuana     Comment: Medical marijuana    Sexual activity: Not Currently   Other Topics Concern    None   Social History Narrative    ** Merged History Encounter **          Social Determinants of Health     Financial Resource Strain: Low Risk  (11/10/2023)    Received from [facility]    Overall Financial Resource Strain (CARDIA)     Difficulty of Paying Living Expenses: Not hard at all   Food Insecurity: Food Insecurity Present (11/10/2023)    Received from [facility]    Hunger Vital Sign     Worried About Running Out of Food in the Last Year: Sometimes true     Ran Out of Food in the Last Year: Sometimes true   Transportation Needs: No Transportation Needs (11/10/2023)    Received from [facility]    PRAPARE - Transportation     Lack of Transportation (Medical): No     Lack of  "Transportation (Non-Medical): No   Physical Activity: Not on file   Stress: Not on file   Social Connections: Not on file   Intimate Partner Violence: Not At Risk (11/10/2023)    Received from [facility]    Humiliation, Afraid, Rape, and Kick questionnaire     Fear of Current or Ex-Partner: No     Emotionally Abused: No     Physically Abused: No     Sexually Abused: No   Housing Stability: Low Risk  (11/10/2023)    Received from [facility]    Housing Stability Vital Sign     Unable to Pay for Housing in the Last Year: No     Number of Places Lived in the Last Year: 2     Unstable Housing in the Last Year: No       Subjective         Objective    Physical Exam:   Assessment Type: Assess only  General Appearance: Alert, Awake  Respiratory Pattern: Normal  Chest Assessment: Chest expansion symmetrical  O2 Device: 6L vent    Vitals:  Blood pressure 115/77, pulse 58, temperature 98.2 °F (36.8 °C), temperature source Oral, resp. rate 16, height 6' 4\" (1.93 m), SpO2 100%.          Imaging and other studies: I have personally reviewed pertinent reports.      O2 Device: 6L vent     Plan    Respiratory Plan: Mild Distress pathway, Home Bronchodilator Patient pathway        Resp Comments: (S) Patient has a trach and is on a vent 24/7 settings on vent is , Rate 18, Peep +6, flow trigger 2, I-time .9. Patient has 6L oxygen bleed into ventilator. Patient has a 6 shiley cuffed. Patient is comfortable and stable on home vent. If patient were to be admitted per policy patient needs to go to ICU and be placed on respiratory ventilator. Changed inner cannula. Additional size 6 shiley and 4 shiley cuffed at bedside with one additional size 6 inner cannula at bedside. Patient chart says he takes albuterol Q6 and mucomyst Q6. Per patient he does not take them. Vent check preformed on patients home ventilator, all settings/ actuals, and alarms per set prior to arrival.   "

## 2024-01-03 NOTE — RESPIRATORY THERAPY NOTE
01/03/24 1815   Respiratory Protocol   Protocol Initiated? Yes   Protocol Selection Respiratory   Language Barrier? No  (non-verbal)   Medical & Social History Reviewed? Yes   Diagnostic Studies Reviewed? Yes   Physical Assessment Performed? Yes   Home Devices/Therapy Ventilator   Respiratory Plan Mild Distress pathway;Home Bronchodilator Patient pathway   Respiratory Assessment   Assessment Type Assess only   General Appearance Alert;Awake   Respiratory Pattern Normal   Chest Assessment Chest expansion symmetrical   Resp Comments (S)  Patient has a trach and is on a vent 24/7 settings on vent is , Rate 18, Peep +6, flow trigger 2, I-time .9. Patient has 6L oxygen bleed into ventilator. Patient has a 6 shiley cuffed. Patient is comfortable and stable on home vent. If patient were to be admitted per policy patient needs to go to ICU and be placed on respiratory ventilator. Changed inner cannula. Additional size 6 shiley and 4 shiley cuffed at bedside with one additional size 6 inner cannula at bedside. Patient chart says he takes albuterol Q6 and mucomyst Q6. Per patient he does not take them. Vent check preformed on patients home ventilator, all settings/ actuals, and alarms per set prior to arrival.   O2 Device 6L vent   Surgical Airway Shiley Cuffed   Placement Date/Time: 12/11/23 0850   Tube Size: 6.5 mm  Placed By: (c) Other (Comment)  Type: Tracheostomy  Brand: Jie  Style: Cuffed   Status Cuff Inflated   Site Assessment Clean;Dry   Site Care Open to air   Inner Cannula Care Changed/new   Ties Assessment Intact;Secure   Surgical Airway Cuff Pressure (cm H20)   (MLT)   Equipment at bedside BVM;Additional complete same size trach tube;Additional complete one size smaller trach tube;Obturator;Sterile saline;Additional inner cannula  (Size 6 shiley and size 4 cuffed bedside)

## 2024-01-04 ENCOUNTER — HOME CARE VISIT (OUTPATIENT)
Dept: HOME HEALTH SERVICES | Facility: HOME HEALTHCARE | Age: 39
End: 2024-01-04
Payer: COMMERCIAL

## 2024-01-04 VITALS — SYSTOLIC BLOOD PRESSURE: 118 MMHG | OXYGEN SATURATION: 96 % | DIASTOLIC BLOOD PRESSURE: 74 MMHG | HEART RATE: 67 BPM

## 2024-01-04 VITALS
OXYGEN SATURATION: 99 % | RESPIRATION RATE: 18 BRPM | SYSTOLIC BLOOD PRESSURE: 124 MMHG | DIASTOLIC BLOOD PRESSURE: 78 MMHG | HEART RATE: 68 BPM | TEMPERATURE: 97.3 F

## 2024-01-04 LAB
ATRIAL RATE: 56 BPM
P AXIS: 28 DEGREES
PR INTERVAL: 184 MS
QRS AXIS: 69 DEGREES
QRSD INTERVAL: 78 MS
QT INTERVAL: 418 MS
QTC INTERVAL: 403 MS
T WAVE AXIS: 51 DEGREES
VENTRICULAR RATE: 56 BPM

## 2024-01-04 PROCEDURE — G0299 HHS/HOSPICE OF RN EA 15 MIN: HCPCS

## 2024-01-04 PROCEDURE — G0151 HHCP-SERV OF PT,EA 15 MIN: HCPCS

## 2024-01-04 NOTE — DISCHARGE INSTRUCTIONS
Your workup in the ED are unremarkable.  Your CT scan showed no signs of bleed  Follow-up with your PCP  Return sooner to the ED if you experience worsening symptoms.

## 2024-01-04 NOTE — ED PROVIDER NOTES
History  Chief Complaint   Patient presents with    Altered Mental Status     Family/friend called 911 due to AMS, patient was not respondiong with a blank stare. Answering questions with head shake for EMS.   Denies changes in meds and O2 setting. Chronically on ventilator for MD and NHL.  +thinners, no fall.   Patient nods to questions appropriately.      (Hemanth Swanson) Hemanth Swanson is a 38 y.o. male who identifies as male    They presented to the emergency department on January 3, 2024. Patient presents with:  Altered Mental Status: Family/friend called 911 due to AMS, patient was not respondiong with a blank stare. Answering questions with head shake for EMS. Denies changes in meds and O2 setting. Chronically on ventilator for MD and NHL. +thinners, no fall. Patient nods to questions appropriately.   .    The patient sister at bedside states that they noticed that patient has been really more sleepy than usual.  She went to wake patient up and he had a blank stare therefore, they were worried that something might be wrong due to patient history of muscular dystrophy and non-Hodgkin lymphoma.  Patient is chronically on a ventilator at home. Patient denies recent fever, shortness of breath, nausea, vomiting, diarrhea, or any other complaint at this time.  Patient is able to nod his head yes or no when asked questions.  Per patient's sister, patient is back to baseline in the ED.      Allergies include:  No Known Allergies      Immunizations:    Immunization History  Administered            Date(s) Administered    Pneumococcal Conjugate Vaccine 20-valent (Pcv20), Polysace                          2023      Tdap                  2023    Immunizations Reviewed.          Prior to Admission Medications   Prescriptions Last Dose Informant Patient Reported? Taking?   FLUoxetine (PROzac) 10 mg capsule  Family Member No No   Si capsule (10 mg total) by Per G Tube route daily   Incontinence Supply  Disposable (Comfort Shield Adult Diapers) MISC  Family Member No No   Sig: Use 1 each 4 (four) times a day   Melatonin 5 MG/15ML LIQD  Family Member No No   Sig: 15 mL (5 mg total) by Per PEG Tube route daily at bedtime   Oral Hygiene Products (Toothette Swabs/Dentifrice) SWAB  Family Member No No   Sig: Apply to the mouth or throat 3 (three) times a day   Sennosides (senna) 8.8 mg/5 mL oral syrup  Family Member No No   Sig: 10 mL (17.6 mg total) by Per PEG Tube route daily as needed (Constipation)   Patient not taking: Reported on 10/24/2023   acetylcysteine (MUCOMYST) 200 mg/mL nebulizer solution  Family Member No No   Sig: Take 3 mL (600 mg total) by nebulization every 8 (eight) hours Do not start before 2023.   albuterol (2.5 mg/3 mL) 0.083 % nebulizer solution   No No   Sig: Take 3 mL (2.5 mg total) by nebulization every 8 (eight) hours   apixaban (ELIQUIS) 5 mg  Family Member No No   Si tablet (5 mg total) by Per PEG Tube route 2 (two) times a day   Patient taking differently: 5 mg by Per PEG Tube route 2 (two) times a day. Pt taking orally  Indications: Blockage of Blood Vessel to Lung by a Particle   cholecalciferol (VITAMIN D3) 1,000 units tablet  Family Member No No   Si tablet (1,000 Units total) by Per G Tube route daily   oxygen gas  Family Member Yes No   Sig: Inhale 2 L/min as needed   polyethylene glycol (MIRALAX) 17 g packet  Family Member No No   Si g by Per PEG Tube route daily as needed (constipation)   thiamine 100 MG tablet  Family Member No No   Si tablet (100 mg total) by Per G Tube route daily   Patient not taking: Reported on 10/24/2023      Facility-Administered Medications: None       Past Medical History:   Diagnosis Date    Anxiety     Cancer (HCC)     Depression     Migration of percutaneous endoscopic gastrostomy (PEG) tube (HCC) 2023    Psychiatric disorder     bipolar       Past Surgical History:   Procedure Laterality Date    IR BIOPSY LYMPH  NODE  1/20/2023    IR GASTROSTOMY TUBE PLACEMENT  9/25/2023    IR LUMBAR PUNCTURE  9/6/2023    IR PORT PLACEMENT  3/14/2023    ORCHIECTOMY Left 12/14/2022    Procedure: ORCHIECTOMY INGUINAL;  Surgeon: Flip Michael MD;  Location:  MAIN OR;  Service: Urology    PEG W/TRACHEOSTOMY PLACEMENT N/A 9/8/2023    Procedure: TRACHEOSTOMY WITH INSERTION PEG TUBE;  Surgeon: Rudy Mcmanus MD;  Location: [facility];  Service: General       Family History   Problem Relation Age of Onset    Coronary artery disease Maternal Aunt      I have reviewed and agree with the history as documented.    E-Cigarette/Vaping    E-Cigarette Use Never User      E-Cigarette/Vaping Substances    Nicotine No     THC Yes     CBD No     Flavoring No     Other No     Unknown No      Social History     Tobacco Use    Smoking status: Former     Current packs/day: 0.50     Average packs/day: 0.5 packs/day for 16.0 years (8.0 ttl pk-yrs)     Types: Cigarettes     Start date: 1/1/2008    Smokeless tobacco: Never   Vaping Use    Vaping status: Never Used   Substance Use Topics    Alcohol use: Not Currently     Comment: 5 years ago    Drug use: Not Currently     Types: Marijuana     Comment: Medical marijuana        Review of Systems   Constitutional:  Negative for chills and fever.   HENT:  Negative for ear pain and sore throat.    Eyes:  Negative for pain and visual disturbance.   Respiratory:  Negative for cough and shortness of breath.    Cardiovascular:  Negative for chest pain and palpitations.   Gastrointestinal:  Negative for abdominal pain and vomiting.   Genitourinary:  Negative for dysuria and hematuria.   Musculoskeletal:  Negative for arthralgias and back pain.   Skin:  Negative for color change and rash.   Neurological:  Negative for seizures and syncope.   All other systems reviewed and are negative.      Physical Exam  ED Triage Vitals [01/03/24 1754]   Temperature Pulse Respirations Blood Pressure SpO2   98.2 °F (36.8 °C) 58 16  115/77 100 %      Temp Source Heart Rate Source Patient Position - Orthostatic VS BP Location FiO2 (%)   Oral Monitor Lying Right arm --      Pain Score       --             Orthostatic Vital Signs  Vitals:    01/03/24 1754 01/03/24 2025 01/03/24 2130   BP: 115/77 112/79 125/81   Pulse: 58 62 58   Patient Position - Orthostatic VS: Lying Lying Lying       Physical Exam  Vitals and nursing note reviewed.   Constitutional:       General: He is not in acute distress.     Appearance: He is well-developed.   HENT:      Head: Normocephalic and atraumatic.      Salivary Glands: Tender: ..        Comments: Chronic vent in place  Eyes:      Conjunctiva/sclera: Conjunctivae normal.   Cardiovascular:      Rate and Rhythm: Normal rate and regular rhythm.      Heart sounds: No murmur heard.  Pulmonary:      Effort: Pulmonary effort is normal. No respiratory distress.      Breath sounds: Normal breath sounds.   Abdominal:      Palpations: Abdomen is soft.      Tenderness: There is no abdominal tenderness.   Musculoskeletal:         General: No swelling.      Cervical back: Neck supple.   Skin:     General: Skin is warm and dry.      Capillary Refill: Capillary refill takes less than 2 seconds.   Neurological:      Mental Status: He is alert. Mental status is at baseline.      Comments: Patient with history of muscular dystrophy.  Nonverbal, only nod head with questions   Psychiatric:         Mood and Affect: Mood normal.         ED Medications  Medications - No data to display    Diagnostic Studies  Results Reviewed       Procedure Component Value Units Date/Time    Basic metabolic panel [[id number]]  (Abnormal) Collected: 01/03/24 1956    Lab Status: Final result Specimen: Blood from Arm, Right Updated: 01/03/24 2021     Sodium 134 mmol/L      Potassium 3.9 mmol/L      Chloride 96 mmol/L      CO2 32 mmol/L      ANION GAP 6 mmol/L      BUN 5 mg/dL      Creatinine 0.88 mg/dL      Glucose 129 mg/dL      Calcium 9.2 mg/dL      eGFR  110 ml/min/1.73sq m     Narrative:      National Kidney Disease Foundation guidelines for Chronic Kidney Disease (CKD):     Stage 1 with normal or high GFR (GFR > 90 mL/min/1.73 square meters)    Stage 2 Mild CKD (GFR = 60-89 mL/min/1.73 square meters)    Stage 3A Moderate CKD (GFR = 45-59 mL/min/1.73 square meters)    Stage 3B Moderate CKD (GFR = 30-44 mL/min/1.73 square meters)    Stage 4 Severe CKD (GFR = 15-29 mL/min/1.73 square meters)    Stage 5 End Stage CKD (GFR <15 mL/min/1.73 square meters)  Note: GFR calculation is accurate only with a steady state creatinine    Blood gas, venous [[id number]]  (Abnormal) Collected: 01/03/24 1956    Lab Status: Final result Specimen: Blood from Arm, Right Updated: 01/03/24 2010     pH, Rasheed 7.322     pCO2, Rasheed 59.5 mm Hg      pO2, Rasheed 34.4 mm Hg      HCO3, Rasheed 30.1 mmol/L      Base Excess, Rasheed 2.6 mmol/L      O2 Content, Rasheed 10.9 ml/dL      O2 HGB, VENOUS 57.0 %     CBC and differential [[id number]]  (Abnormal) Collected: 01/03/24 1956    Lab Status: Final result Specimen: Blood from Arm, Right Updated: 01/03/24 2007     WBC 10.14 Thousand/uL      RBC 4.22 Million/uL      Hemoglobin 12.6 g/dL      Hematocrit 39.5 %      MCV 94 fL      MCH 29.9 pg      MCHC 31.9 g/dL      RDW 14.6 %      MPV 9.1 fL      Platelets 244 Thousands/uL      nRBC 0 /100 WBCs      Neutrophils Relative 77 %      Immat GRANS % 0 %      Lymphocytes Relative 17 %      Monocytes Relative 5 %      Eosinophils Relative 1 %      Basophils Relative 0 %      Neutrophils Absolute 7.76 Thousands/µL      Immature Grans Absolute 0.04 Thousand/uL      Lymphocytes Absolute 1.71 Thousands/µL      Monocytes Absolute 0.49 Thousand/µL      Eosinophils Absolute 0.10 Thousand/µL      Basophils Absolute 0.04 Thousands/µL                    CT head without contrast   Final Result by Morteza Boles MD (01/03 2031)      No acute intracranial abnormality.                  Workstation performed: [id number]                Procedures  ECG 12 Lead Documentation Only    Date/Time: 1/3/2024 8:31 PM    Performed by: Nahomi Desai MD  Authorized by: Nahomi Desai MD    Indications / Diagnosis:  Altered mental status  ECG reviewed by me, the ED Provider: yes    Patient location:  ED  Previous ECG:     Previous ECG:  Compared to current    Comparison ECG info:  September 24, 2023    Similarity:  Changes noted    Comparison to cardiac monitor: Yes    Interpretation:     Interpretation: abnormal    Rate:     ECG rate:  56 bpm    ECG rate assessment: bradycardic    Rhythm:     Rhythm: sinus rhythm and sinus bradycardia    Ectopy:     Ectopy: none    QRS:     QRS axis:  Normal    QRS intervals:  Normal  Conduction:     Conduction: normal    ST segments:     ST segments:  Abnormal    Elevation:  II, III and aVF  T waves:     T waves: normal    Comments:      Ventricular rate 56 bpm, CA interval 184, QRS duration 78, , QTc 4 3  Minimal ST segment elevation in inferior lead are now present        ED Course  ED Course as of 01/04/24 0130   Wed Jan 03, 2024 1945 38-year-old male past medical history of muscular dystrophy, non-Hodgkin lymphoma on chronic vent presented for evaluation of altered mental status   1946 Patient's sister at bedside reported that patient had a blank stare earlier today however in the ED he seems to be at base line.  She stated patient may be more tired than usual and fell sleepy because he did not have much sleep the previous night.   2045 CT head without contrast  No acute intracranial abnormalities   2046 Patient is at baseline.  Patient stable for discharge.  Patient discharged with return precautions.   2046 Basic metabolic panel(!)  Mild hyponatremic of 134.  Normal anion gap, no CHRIST   2047 CBC and differential(!)  Wnl, no white count, normal H&H                                       Medical Decision Making  Patient presents with:  Altered Mental Status: Family/friend called 911  due to AMS, patient was not respondiong with a blank stare. Answering questions with head shake for EMS. Denies changes in meds and O2 setting. Chronically on ventilator for MD and NHL. +thinners, no fall. Patient nods to questions appropriately.       Patient seen and examined noted to have change in mental status.      Temp:  [98.2 °F (36.8 °C)] 98.2 °F (36.8 °C)  HR:  [58-62] 58  Resp:  [16] 16  BP: (112-125)/(77-81) 125/81    Differential diagnosis includes but is not limited to somnolence, arrhythmia, intracranial bleed among others.      Due to patient's history and presentation the following laboratory evidence was collected:  Recent Results (from the past 12 hour(s))  -ECG 12 lead:   Collection Time: 01/03/24  6:01 PM       Result                      Value             Ref Range           Ventricular Rate            56                BPM                 Atrial Rate                 56                BPM                 WY Interval                 184               ms                  QRSD Interval               78                ms                  QT Interval                 418               ms                  QTC Interval                403               ms                  P Axis                      28                degrees             QRS Axis                    69                degrees             T Wave Axis                 51                degrees        -CBC and differential:   Collection Time: 01/03/24  7:56 PM       Result                      Value             Ref Range           WBC                         10.14             4.31 - 10.16*       RBC                         4.22              3.88 - 5.62 *       Hemoglobin                  12.6              12.0 - 17.0 *       Hematocrit                  39.5              36.5 - 49.3 %       MCV                         94                82 - 98 fL          MCH                         29.9              26.8 - 34.3 *       MCHC                         31.9              31.4 - 37.4 *       RDW                         14.6              11.6 - 15.1 %       MPV                         9.1               8.9 - 12.7 fL       Platelets                   244               149 - 390 Th*       nRBC                        0                 /100 WBCs           Neutrophils Relative        77 (H)            43 - 75 %           Immat GRANS %               0                 0 - 2 %             Lymphocytes Relative        17                14 - 44 %           Monocytes Relative          5                 4 - 12 %            Eosinophils Relative        1                 0 - 6 %             Basophils Relative          0                 0 - 1 %             Neutrophils Absolute        7.76 (H)          1.85 - 7.62 *       Immature Grans Absolute     0.04              0.00 - 0.20 *       Lymphocytes Absolute        1.71              0.60 - 4.47 *       Monocytes Absolute          0.49              0.17 - 1.22 *       Eosinophils Absolute        0.10              0.00 - 0.61 *       Basophils Absolute          0.04              0.00 - 0.10 *  -Basic metabolic panel:   Collection Time: 01/03/24  7:56 PM       Result                      Value             Ref Range           Sodium                      134 (L)           135 - 147 mm*       Potassium                   3.9               3.5 - 5.3 mm*       Chloride                    96                96 - 108 mmo*       CO2                         32                21 - 32 mmol*       ANION GAP                   6                 mmol/L              BUN                         5                 5 - 25 mg/dL        Creatinine                  0.88              0.60 - 1.30 *       Glucose                     129               65 - 140 mg/*       Calcium                     9.2               8.4 - 10.2 m*       eGFR                        110               ml/min/1.73s*  -Blood gas, venous:   Collection Time: 01/03/24  7:56 PM       Result                       Value             Ref Range           pH, Rasheed                     7.322             7.300 - 7.400       pCO2, Rasheed                   59.5 (H)          42.0 - 50.0 *       pO2, Rasheed                    34.4 (L)          35.0 - 45.0 *       HCO3, Rasheed                   30.1 (H)          24 - 30 mmol*       Base Excess, Rasheed            2.6               mmol/L              O2 Content, Rasheed             10.9              ml/dL               O2 HGB, VENOUS              57.0 (L)          60.0 - 80.0 %    Due to patient's history and presentation the following imaging was collected:  CT head without contrast   Final Result        No acute intracranial abnormality.                        Workstation performed: [id number]         No results found.       EKG: interpreted by myself as above.      Patient appears well, is nontoxic appearing, patient expresses understanding and agrees with plan of care at this time.  In light of this patient would benefit from outpatient management.    I called patient's sister and communicate the unremarkable workup.  Patient stable for discharge.  Patient discharged with return precautions.        Amount and/or Complexity of Data Reviewed  Labs: ordered. Decision-making details documented in ED Course.  Radiology: ordered. Decision-making details documented in ED Course.          Disposition  Final diagnoses:   Change in mental status - Resolved     Time reflects when diagnosis was documented in both MDM as applicable and the Disposition within this note       Time User Action Codes Description Comment    1/3/2024  9:02 PM Nahomi Desai Add [R41.82] Change in mental status     1/3/2024  9:02 PM Nahomi Desai Modify [R41.82] Change in mental status Resolved          ED Disposition       ED Disposition   Discharge    Condition   Stable    Date/Time   Wed Efrain 3, 2024  9:01 PM    Comment   Hemanth Swanson discharge to home/self care.                    Follow-up Information       Follow up With Specialties Details Why Contact Info    Dora Lee MD Family Medicine   [address] [address]  [phone]              Discharge Medication List as of 1/3/2024  9:03 PM        CONTINUE these medications which have NOT CHANGED    Details   acetylcysteine (MUCOMYST) 200 mg/mL nebulizer solution Take 3 mL (600 mg total) by nebulization every 8 (eight) hours Do not start before September 29, 2023., Starting Fri 9/29/2023, No Print      albuterol (2.5 mg/3 mL) 0.083 % nebulizer solution Take 3 mL (2.5 mg total) by nebulization every 8 (eight) hours, Starting Thu 10/26/2023, Until Wed 1/24/2024, Normal      apixaban (ELIQUIS) 5 mg 1 tablet (5 mg total) by Per PEG Tube route 2 (two) times a day, Starting Wed 10/18/2023, Normal      cholecalciferol (VITAMIN D3) 1,000 units tablet 1 tablet (1,000 Units total) by Per G Tube route daily, Starting Wed 10/18/2023, Normal      FLUoxetine (PROzac) 10 mg capsule 1 capsule (10 mg total) by Per G Tube route daily, Starting Wed 10/18/2023, Normal      Incontinence Supply Disposable (Comfort Shield Adult Diapers) MISC Use 1 each 4 (four) times a day, Starting Wed 10/18/2023, Normal      Melatonin 5 MG/15ML LIQD 15 mL (5 mg total) by Per PEG Tube route daily at bedtime, Starting Thu 10/26/2023, Until Wed 1/24/2024, Normal      Oral Hygiene Products (Toothette Swabs/Dentifrice) SWAB Apply to the mouth or throat 3 (three) times a day, Starting Wed 10/18/2023, Normal      oxygen gas Inhale 2 L/min as needed, Historical Med      polyethylene glycol (MIRALAX) 17 g packet 17 g by Per PEG Tube route daily as needed (constipation), Starting Sat 9/23/2023, Normal      Sennosides (senna) 8.8 mg/5 mL oral syrup 10 mL (17.6 mg total) by Per PEG Tube route daily as needed (Constipation), Starting Sat 9/23/2023, Normal      thiamine 100 MG tablet 1 tablet (100 mg total) by Per G Tube route daily, Starting Wed  10/18/2023, Normal           No discharge procedures on file.    PDMP Review       None             ED Provider  Attending physically available and evaluated Hemanth Swanson. I managed the patient along with the ED Attending.    Electronically Signed by           Nahomi Desai MD  01/04/24 0131

## 2024-01-05 ENCOUNTER — HOME CARE VISIT (OUTPATIENT)
Dept: HOME HEALTH SERVICES | Facility: HOME HEALTHCARE | Age: 39
End: 2024-01-05

## 2024-01-05 NOTE — CASE COMMUNICATION
"Arrived at pt's home for scheduled speech therapy visit as confirmed last night with Dmitry via text.  No answer via text this morning and initially no one answered the door. Several minutes later, pt's niece answered and stated pt and CG are still asleep; visit declined.  Asked briefly about pt's eating and niece reported \"they gave him regular food at the hospital and he was fine\" in regards to recent ED visit this week.  Noted that VB S order was placed by PCP.  Will contact CG Dmitry with phone number for scheduling.  "

## 2024-01-07 NOTE — ED ATTENDING ATTESTATION
1/3/2024  IBhupinder MD, saw and evaluated the patient. I have discussed the patient with the resident/non-physician practitioner and agree with the resident's/non-physician practitioner's findings, Plan of Care, and MDM as documented in the resident's/non-physician practitioner's note, except where noted. All available labs and Radiology studies were reviewed.  I was present for key portions of any procedure(s) performed by the resident/non-physician practitioner and I was immediately available to provide assistance.       At this point I agree with the current assessment done in the Emergency Department.  I have conducted an independent evaluation of this patient a history and physical is as follows:see h and p above     ED Course  ED Course as of 01/07/24 1138   Wed Jan 03, 2024 1954 Er md note- 12 lead ecg reviewed by er md - sinus brooke rate of  56-  no signs of ischemia / injury / r heart strain / alirio/pericarditis- no ecg signs of  short- long qtc- wpw- brugada syndrome- hcm- epsilon waves compared to previous ecg-  9/24/23- other than increased rate- no other sign changes   1957 Er md note- pre hosp 12 lead ecg reviewed anc comapred by er md - no sign changes   2100 Er md note- pt re-eval- sound asleep-  did not head that he was ok with  talked to pt--          Critical Care Time  Procedures

## 2024-01-08 ENCOUNTER — HOME CARE VISIT (OUTPATIENT)
Dept: HOME HEALTH SERVICES | Facility: HOME HEALTHCARE | Age: 39
End: 2024-01-08
Payer: COMMERCIAL

## 2024-01-08 ENCOUNTER — TELEPHONE (OUTPATIENT)
Dept: PULMONOLOGY | Facility: CLINIC | Age: 39
End: 2024-01-08

## 2024-01-08 PROCEDURE — G0153 HHCP-SVS OF S/L PATH,EA 15MN: HCPCS

## 2024-01-08 NOTE — CASE COMMUNICATION
Pt has a new PCP:  Genaro Helm PA-C  [facility] - [address]  [phone]

## 2024-01-09 ENCOUNTER — TELEPHONE (OUTPATIENT)
Dept: PULMONOLOGY | Facility: CLINIC | Age: 39
End: 2024-01-09

## 2024-01-09 ENCOUNTER — HOME CARE VISIT (OUTPATIENT)
Dept: HOME HEALTH SERVICES | Facility: HOME HEALTHCARE | Age: 39
End: 2024-01-09
Payer: COMMERCIAL

## 2024-01-09 NOTE — TELEPHONE ENCOUNTER
[facility] Respiratory Therapist from [facility] called in regards to patient Ventilator and requested a call back at [phone].

## 2024-01-09 NOTE — TELEPHONE ENCOUNTER
Siria saw the pt a few days ago and he does not trigger the ventilator anymore. He has a 0% trigger. The pt is now using ventilator 24 hours

## 2024-01-09 NOTE — TELEPHONE ENCOUNTER
Patient is a patient of [facility] they are managing his ventilator settings

## 2024-01-11 ENCOUNTER — HOME CARE VISIT (OUTPATIENT)
Dept: HOME HEALTH SERVICES | Facility: HOME HEALTHCARE | Age: 39
End: 2024-01-11
Payer: COMMERCIAL

## 2024-01-11 VITALS
RESPIRATION RATE: 18 BRPM | SYSTOLIC BLOOD PRESSURE: 130 MMHG | DIASTOLIC BLOOD PRESSURE: 80 MMHG | HEART RATE: 62 BPM | OXYGEN SATURATION: 99 % | TEMPERATURE: 97.1 F

## 2024-01-11 PROCEDURE — G0299 HHS/HOSPICE OF RN EA 15 MIN: HCPCS

## 2024-01-12 ENCOUNTER — HOME CARE VISIT (OUTPATIENT)
Dept: HOME HEALTH SERVICES | Facility: HOME HEALTHCARE | Age: 39
End: 2024-01-12
Payer: COMMERCIAL

## 2024-01-16 ENCOUNTER — HOME CARE VISIT (OUTPATIENT)
Dept: HOME HEALTH SERVICES | Facility: HOME HEALTHCARE | Age: 39
End: 2024-01-16
Payer: COMMERCIAL

## 2024-01-17 ENCOUNTER — HOME CARE VISIT (OUTPATIENT)
Dept: HOME HEALTH SERVICES | Facility: HOME HEALTHCARE | Age: 39
End: 2024-01-17
Payer: COMMERCIAL

## 2024-01-23 ENCOUNTER — HOME CARE VISIT (OUTPATIENT)
Dept: HOME HEALTH SERVICES | Facility: HOME HEALTHCARE | Age: 39
End: 2024-01-23
Payer: COMMERCIAL

## 2024-01-23 PROCEDURE — G0153 HHCP-SVS OF S/L PATH,EA 15MN: HCPCS

## 2024-01-23 NOTE — CASE COMMUNICATION
Pt was discharged from home based speech therapy today.  At the end of December, pt's sister advanced pt's diet to soft/regular with thin liquids.  Educated pt/sister re: risks and recommended a repeat VBS while on the vent (previous study done while on trach collar only).  Requested script from pt's PCP Salvador Helm, however, incorrect test was ordered.  Left message with PCP's office on 1/8/24 to clarify/change order but it is still  not ordered in Epic.    Clinically, pt appears to tolerate the soft/regular diet and thin liquids that he self advanced to.  He has been on this diet for over 3 weeks now without obvious respiratory consequence.  Pt/sister have been educated in s/s aspiration, precautions, and swallowing strategies and instructed to notify MD if any further concerns arise.

## 2024-01-24 ENCOUNTER — HOME CARE VISIT (OUTPATIENT)
Dept: HOME HEALTH SERVICES | Facility: HOME HEALTHCARE | Age: 39
End: 2024-01-24
Payer: COMMERCIAL

## 2024-01-24 VITALS
RESPIRATION RATE: 16 BRPM | OXYGEN SATURATION: 99 % | HEART RATE: 58 BPM | DIASTOLIC BLOOD PRESSURE: 76 MMHG | TEMPERATURE: 97.9 F | SYSTOLIC BLOOD PRESSURE: 140 MMHG

## 2024-01-24 PROCEDURE — G0299 HHS/HOSPICE OF RN EA 15 MIN: HCPCS

## 2024-01-26 ENCOUNTER — TELEPHONE (OUTPATIENT)
Age: 39
End: 2024-01-26

## 2024-01-26 NOTE — TELEPHONE ENCOUNTER
Corazon from [employer] called to see if form for incontinence supplies was received. I did not see anything. Corazon will refax. Please, look out for form.

## 2024-01-30 ENCOUNTER — OFFICE VISIT (OUTPATIENT)
Dept: PULMONOLOGY | Facility: CLINIC | Age: 39
End: 2024-01-30
Payer: COMMERCIAL

## 2024-01-30 VITALS
RESPIRATION RATE: 20 BRPM | DIASTOLIC BLOOD PRESSURE: 80 MMHG | HEART RATE: 99 BPM | OXYGEN SATURATION: 99 % | TEMPERATURE: 97.8 F | SYSTOLIC BLOOD PRESSURE: 120 MMHG

## 2024-01-30 DIAGNOSIS — Z93.0 STATUS POST TRACHEOSTOMY (HCC): Primary | ICD-10-CM

## 2024-01-30 PROCEDURE — 99214 OFFICE O/P EST MOD 30 MIN: CPT | Performed by: STUDENT IN AN ORGANIZED HEALTH CARE EDUCATION/TRAINING PROGRAM

## 2024-01-30 RX ORDER — ALBUTEROL SULFATE 1.25 MG/3ML
1.25 SOLUTION RESPIRATORY (INHALATION) EVERY 6 HOURS PRN
COMMUNITY

## 2024-01-30 NOTE — PATIENT INSTRUCTIONS
It was a pleasure seeing you today!    Currently on AC VC , RR 26, MV 12-14 L  Trilogy Rojas Gracia device   We will try a pressure support trial BiPAP back up rate 12, 16/8 with 6 LPM    Yessy Barksdale MD  Pulmonary and Critical Care Medicine

## 2024-01-30 NOTE — PROGRESS NOTES
Pulmonary Outpatient Progress Note   Hemanth Swanson 38 y.o. male MRN: [id number]  1/30/2024      No problem-specific Assessment & Plan notes found for this encounter.      Assessment:    Ventilator dependent respiratory failure from an undiagnosed neurological condition.  His tracheostomy was performed in September at [facility] by general surgery, initially 8 oh cuffed, over the next few months he was downsized to a Shiley 4 and eventually had a capping trial.  Several days into the capping trial he developed possible aspiration pneumonia and ended up being hospitalized at [facility] with a upsize his tracheostomy to 8.0, he had a bronchoscopy performed and required full ventilator support at that time.  Since then family initially opted to go back to [facility] but changed her mind and came back to [facility].  Currently he is on full support AC VC with tidal volumes around 550, respiratory rate 24 with minute ventilation 12 to 14 L, 100% spontaneous triggers.  I called his respiratory therapist in the room and stated he may be candidate to try BiPAP on going forward along with Flovent support nocturnally.  She agrees.  We also ordered vent supplies such as in her cannulas.  Acute intermediate low risk pulmonary embolism on Eliquis  PEG tube dependent but now using p.o., speech therapy no longer following    Plan:    I discussed with respiratory therapist, and place orders for patient to be on BiPAP 16/8 with 6 L bled in through the ventilator.  Can try that in the daytime and use assist-control nightly.  Once stable on this he can follow-up with me in 1 month and if he is doing well on this and were able to see compliance reports then will likely be on BiPAP full-time.  If he tolerates BiPAP full-time then eventually trach collar and then move towards decannulation.  As of now we will move slowly given his setback  Continue Eliquis  Continue DuoNebs along with Mucomyst  No longer needs  vest therapy  Follow-up in 1 month with me  I have spent a total time of 41 minutes on 01/30/24 in caring for this patient including Diagnostic results, Prognosis, Risks and benefits of tx options, Instructions for management, Patient and family education, Importance of tx compliance, Impressions, Counseling / Coordination of care, Documenting in the medical record, Reviewing / ordering tests, medicine, procedures  , Obtaining or reviewing history  , and Communicating with other healthcare professionals .      History of Present Illness   HPI:    38-year-old gentleman here for follow-up.  He has a past medical history of undiagnosed neuromuscular disease admitted initially to [facility] and had difficulty weaning off the ventilator therefore required 8 oh Shiley cuffed tracheostomy, PEG tube insertion.  He was discharged home as a family refused LTAC or nursing facility.  He came back to the hospital 1 day later due to PEG tube malfunction.  Eventually after this was adjusted and fixed by interventional radiology, he was discharged home again.  Since then he has been initially following up with me in October November where we were working towards decannulation he was initially at 8.0 Shiley and eventually 6.0 Shiley and then 4.0 Shiley then we did trach trial followed by capping trial.  During the capping trial he had a setback with pneumonia and was admitted to [facility] with a performed upsizing of tracheostomy back to a point now was in the ICU on full ventilator support.  In November was his admission to [facility], since then he has been on full vent support.  He is now here for follow-up.    He is currently on assist-control volume control on a trilogy ventilator from [employer].  He has [employer] [facility] who helps with his tracheostomy supplies and cannulation issues.    He recently graduated from speech pathology training and now is able to tolerate regular diet, occasionally gets up  out of the wheelchair with assistance.  Occasionally able to talk or even around his tracheostomy.    Venous blood gas 7.32, 60, 34  No PFT on file  Bronchoscopy was performed by me in September showed scant secretions throughout    Review of Systems   Constitutional:  Positive for activity change. Negative for chills, diaphoresis and fatigue.   HENT:  Positive for voice change.    Eyes: Negative.    Respiratory:  Positive for cough and wheezing.    Cardiovascular: Negative.    Gastrointestinal: Negative.    Endocrine: Negative.    Genitourinary: Negative.    Musculoskeletal: Negative.    Skin: Negative.    Allergic/Immunologic: Negative.    Neurological:  Positive for speech difficulty. Negative for dizziness.   Hematological: Negative.    Psychiatric/Behavioral: Negative.          Historical Information   Past Medical History:   Diagnosis Date   • Anxiety    • Cancer (HCC)    • Depression    • Migration of percutaneous endoscopic gastrostomy (PEG) tube ([facility]) 09/24/2023   • Psychiatric disorder     bipolar     Past Surgical History:   Procedure Laterality Date   • IR BIOPSY LYMPH NODE  1/20/2023   • IR GASTROSTOMY TUBE PLACEMENT  9/25/2023   • IR LUMBAR PUNCTURE  9/6/2023   • IR PORT PLACEMENT  3/14/2023   • ORCHIECTOMY Left 12/14/2022    Procedure: ORCHIECTOMY INGUINAL;  Surgeon: Flip Michael MD;  Location:  MAIN OR;  Service: Urology   • PEG W/TRACHEOSTOMY PLACEMENT N/A 9/8/2023    Procedure: TRACHEOSTOMY WITH INSERTION PEG TUBE;  Surgeon: Rudy Mcmanus MD;  Location: [facility] MAIN OR;  Service: General     Family History   Problem Relation Age of Onset   • Coronary artery disease Maternal Aunt      Social History     Tobacco Use   Smoking Status Former   • Current packs/day: 0.50   • Average packs/day: 0.5 packs/day for 16.1 years (8.0 ttl pk-yrs)   • Types: Cigarettes   • Start date: 1/1/2008   Smokeless Tobacco Never       Occupational History: disabled     Meds/Allergies     Current Outpatient  Medications:   •  acetylcysteine (MUCOMYST) 200 mg/mL nebulizer solution, Take 3 mL (600 mg total) by nebulization every 8 (eight) hours Do not start before September 29, 2023., Disp: , Rfl: 0  •  albuterol (ACCUNEB) 1.25 MG/3ML nebulizer solution, Take 1.25 mg by nebulization every 6 (six) hours as needed for wheezing, Disp: , Rfl:   •  apixaban (ELIQUIS) 5 mg, 1 tablet (5 mg total) by Per PEG Tube route 2 (two) times a day (Patient taking differently: 5 mg by Per PEG Tube route 2 (two) times a day Pt taking orally), Disp: 60 tablet, Rfl: 2  •  Incontinence Supply Disposable (Comfort Shield Adult Diapers) MISC, Use 1 each 4 (four) times a day, Disp: 48 each, Rfl: 5  •  Oral Hygiene Products (Toothette Swabs/Dentifrice) SWAB, Apply to the mouth or throat 3 (three) times a day, Disp: 1000 each, Rfl: 1  •  oxygen gas, Inhale 6 L/min as needed, Disp: , Rfl:   •  cholecalciferol (VITAMIN D3) 1,000 units tablet, 1 tablet (1,000 Units total) by Per G Tube route daily (Patient not taking: Reported on 1/30/2024), Disp: 30 tablet, Rfl: 0  •  FLUoxetine (PROzac) 10 mg capsule, 1 capsule (10 mg total) by Per G Tube route daily (Patient not taking: Reported on 1/30/2024), Disp: 30 capsule, Rfl: 2  •  polyethylene glycol (MIRALAX) 17 g packet, 17 g by Per PEG Tube route daily as needed (constipation) (Patient not taking: Reported on 1/30/2024), Disp: 10 each, Rfl: 0  •  Sennosides (senna) 8.8 mg/5 mL oral syrup, 10 mL (17.6 mg total) by Per PEG Tube route daily as needed (Constipation) (Patient not taking: Reported on 10/24/2023), Disp: 236 mL, Rfl: 0  •  thiamine 100 MG tablet, 1 tablet (100 mg total) by Per G Tube route daily (Patient not taking: Reported on 10/24/2023), Disp: 30 tablet, Rfl: 2  No Known Allergies    Vitals: Blood pressure 120/80, pulse 99, temperature 97.8 °F (36.6 °C), temperature source Tympanic, resp. rate 20, SpO2 99%., There is no height or weight on file to calculate BMI. Oxygen Therapy  SpO2: 99  "%    Physical Exam:    GEN: alert and oriented x 3, pleasant and cooperative   HEENT:  Normocephalic, atraumatic, anicteric  NECK: No JVD, trach site clear   HEART: Rate, normal S1 and S2  LUNGS: Clear to auscultation bilaterally; no wheezes, rales, or rhonchi; respiration nonlabored   ABDOMEN:  Normoactive bowel sounds, soft, no tenderness, no distention  EXTREMITIES: peripheral pulses palpable; no edema  NEURO: no gross focal findings; cranial nerves grossly intact   SKIN:  Dry, intact, warm to touch    Labs: I have personally reviewed pertinent lab results.  No results found for: \"IGE\"    Imaging and other studies: I have personally reviewed pertinent films in PACS    Pulmonary function testing:  NA    Other Studies: I have personally reviewed pertinent reports.      Yessy Barksdale MD  Pulmonary and Critical Care Medicine     "

## 2024-02-06 ENCOUNTER — TELEPHONE (OUTPATIENT)
Dept: PULMONOLOGY | Facility: CLINIC | Age: 39
End: 2024-02-06

## 2024-02-06 NOTE — TELEPHONE ENCOUNTER
Pulmonary    Order faxed for pressure change to BiPAP 16/8    Received confirmation that pressures have been adjusted

## 2024-02-07 LAB

## 2024-02-10 ENCOUNTER — APPOINTMENT (EMERGENCY)
Dept: CT IMAGING | Facility: HOSPITAL | Age: 39
End: 2024-02-10
Payer: COMMERCIAL

## 2024-02-10 ENCOUNTER — APPOINTMENT (EMERGENCY)
Dept: RADIOLOGY | Facility: HOSPITAL | Age: 39
End: 2024-02-10
Payer: COMMERCIAL

## 2024-02-10 ENCOUNTER — HOSPITAL ENCOUNTER (EMERGENCY)
Facility: HOSPITAL | Age: 39
Discharge: HOME | End: 2024-02-11
Attending: EMERGENCY MEDICINE
Payer: COMMERCIAL

## 2024-02-10 DIAGNOSIS — J69.0 ASPIRATION PNEUMONIA (HCC): Primary | ICD-10-CM

## 2024-02-10 DIAGNOSIS — Z93.0 TRACHEOSTOMY IN PLACE (HCC): ICD-10-CM

## 2024-02-10 LAB
ANION GAP SERPL CALCULATED.3IONS-SCNC: 10 MMOL/L
ATRIAL RATE: 90 BPM
BACTERIA UR QL AUTO: ABNORMAL /HPF
BASE EX.OXY STD BLDV CALC-SCNC: 40.8 % (ref 60–80)
BASE EXCESS BLDV CALC-SCNC: 0.9 MMOL/L
BASOPHILS # BLD AUTO: 0.05 THOUSANDS/ÂΜL (ref 0–0.1)
BASOPHILS NFR BLD AUTO: 0 % (ref 0–1)
BILIRUB UR QL STRIP: NEGATIVE
BUN SERPL-MCNC: 6 MG/DL (ref 5–25)
CALCIUM SERPL-MCNC: 9.1 MG/DL (ref 8.4–10.2)
CHLORIDE SERPL-SCNC: 91 MMOL/L (ref 96–108)
CLARITY UR: CLEAR
CO2 SERPL-SCNC: 29 MMOL/L (ref 21–32)
COLOR UR: ABNORMAL
CREAT SERPL-MCNC: 0.82 MG/DL (ref 0.6–1.3)
EOSINOPHIL # BLD AUTO: 0.07 THOUSAND/ÂΜL (ref 0–0.61)
EOSINOPHIL NFR BLD AUTO: 0 % (ref 0–6)
ERYTHROCYTE [DISTWIDTH] IN BLOOD BY AUTOMATED COUNT: 14.6 % (ref 11.6–15.1)
FLUAV RNA RESP QL NAA+PROBE: NEGATIVE
FLUBV RNA RESP QL NAA+PROBE: NEGATIVE
GFR SERPL CREATININE-BSD FRML MDRD: 113 ML/MIN/1.73SQ M
GLUCOSE SERPL-MCNC: 111 MG/DL (ref 65–140)
GLUCOSE UR STRIP-MCNC: NEGATIVE MG/DL
HCO3 BLDV-SCNC: 29.7 MMOL/L (ref 24–30)
HCT VFR BLD AUTO: 37.9 % (ref 36.5–49.3)
HGB BLD-MCNC: 12.2 G/DL (ref 12–17)
HGB UR QL STRIP.AUTO: NEGATIVE
IMM GRANULOCYTES # BLD AUTO: 0.39 THOUSAND/UL (ref 0–0.2)
IMM GRANULOCYTES NFR BLD AUTO: 2 % (ref 0–2)
KETONES UR STRIP-MCNC: NEGATIVE MG/DL
LACTATE SERPL-SCNC: 1.8 MMOL/L (ref 0.5–2)
LEUKOCYTE ESTERASE UR QL STRIP: NEGATIVE
LYMPHOCYTES # BLD AUTO: 1.36 THOUSANDS/ÂΜL (ref 0.6–4.47)
LYMPHOCYTES NFR BLD AUTO: 7 % (ref 14–44)
MCH RBC QN AUTO: 29.6 PG (ref 26.8–34.3)
MCHC RBC AUTO-ENTMCNC: 32.2 G/DL (ref 31.4–37.4)
MCV RBC AUTO: 92 FL (ref 82–98)
MONOCYTES # BLD AUTO: 0.84 THOUSAND/ÂΜL (ref 0.17–1.22)
MONOCYTES NFR BLD AUTO: 4 % (ref 4–12)
MUCOUS THREADS UR QL AUTO: ABNORMAL
NEUTROPHILS # BLD AUTO: 17.15 THOUSANDS/ÂΜL (ref 1.85–7.62)
NEUTS SEG NFR BLD AUTO: 87 % (ref 43–75)
NITRITE UR QL STRIP: NEGATIVE
NON-SQ EPI CELLS URNS QL MICRO: ABNORMAL /HPF
NRBC BLD AUTO-RTO: 0 /100 WBCS
O2 CT BLDV-SCNC: 7.6 ML/DL
P AXIS: 55 DEGREES
PCO2 BLDV: 67.8 MM HG (ref 42–50)
PH BLDV: 7.26 [PH] (ref 7.3–7.4)
PH UR STRIP.AUTO: 6 [PH]
PLATELET # BLD AUTO: 353 THOUSANDS/UL (ref 149–390)
PMV BLD AUTO: 9 FL (ref 8.9–12.7)
PO2 BLDV: 27.9 MM HG (ref 35–45)
POTASSIUM SERPL-SCNC: 3.9 MMOL/L (ref 3.5–5.3)
PR INTERVAL: 164 MS
PROCALCITONIN SERPL-MCNC: 0.06 NG/ML
PROT UR STRIP-MCNC: ABNORMAL MG/DL
QRS AXIS: 74 DEGREES
QRSD INTERVAL: 90 MS
QT INTERVAL: 350 MS
QTC INTERVAL: 428 MS
RBC # BLD AUTO: 4.12 MILLION/UL (ref 3.88–5.62)
RBC #/AREA URNS AUTO: ABNORMAL /HPF
RSV RNA RESP QL NAA+PROBE: NEGATIVE
SARS-COV-2 RNA RESP QL NAA+PROBE: NEGATIVE
SODIUM SERPL-SCNC: 130 MMOL/L (ref 135–147)
SP GR UR STRIP.AUTO: >=1.03 (ref 1–1.03)
T WAVE AXIS: -4 DEGREES
UROBILINOGEN UR QL STRIP.AUTO: 1 E.U./DL
VENTRICULAR RATE: 90 BPM
WBC # BLD AUTO: 19.86 THOUSAND/UL (ref 4.31–10.16)
WBC #/AREA URNS AUTO: ABNORMAL /HPF

## 2024-02-10 PROCEDURE — 85025 COMPLETE CBC W/AUTO DIFF WBC: CPT | Performed by: EMERGENCY MEDICINE

## 2024-02-10 PROCEDURE — 71045 X-RAY EXAM CHEST 1 VIEW: CPT

## 2024-02-10 PROCEDURE — 99284 EMERGENCY DEPT VISIT MOD MDM: CPT | Performed by: EMERGENCY MEDICINE

## 2024-02-10 PROCEDURE — 80048 BASIC METABOLIC PNL TOTAL CA: CPT | Performed by: EMERGENCY MEDICINE

## 2024-02-10 PROCEDURE — 99285 EMERGENCY DEPT VISIT HI MDM: CPT

## 2024-02-10 PROCEDURE — 36415 COLL VENOUS BLD VENIPUNCTURE: CPT | Performed by: EMERGENCY MEDICINE

## 2024-02-10 PROCEDURE — G1004 CDSM NDSC: HCPCS

## 2024-02-10 PROCEDURE — 81001 URINALYSIS AUTO W/SCOPE: CPT | Performed by: EMERGENCY MEDICINE

## 2024-02-10 PROCEDURE — 83605 ASSAY OF LACTIC ACID: CPT | Performed by: EMERGENCY MEDICINE

## 2024-02-10 PROCEDURE — 84145 PROCALCITONIN (PCT): CPT | Performed by: EMERGENCY MEDICINE

## 2024-02-10 PROCEDURE — 81003 URINALYSIS AUTO W/O SCOPE: CPT | Performed by: EMERGENCY MEDICINE

## 2024-02-10 PROCEDURE — 0241U HB NFCT DS VIR RESP RNA 4 TRGT: CPT | Performed by: EMERGENCY MEDICINE

## 2024-02-10 PROCEDURE — 71250 CT THORAX DX C-: CPT

## 2024-02-10 PROCEDURE — 93005 ELECTROCARDIOGRAM TRACING: CPT

## 2024-02-10 PROCEDURE — 82805 BLOOD GASES W/O2 SATURATION: CPT | Performed by: EMERGENCY MEDICINE

## 2024-02-10 RX ORDER — AZITHROMYCIN 500 MG/1
500 TABLET, FILM COATED ORAL ONCE
Status: COMPLETED | OUTPATIENT
Start: 2024-02-10 | End: 2024-02-10

## 2024-02-10 RX ORDER — AZITHROMYCIN 250 MG/1
TABLET, FILM COATED ORAL
Qty: 4 TABLET | Refills: 0 | Status: SHIPPED | OUTPATIENT
Start: 2024-02-10 | End: 2024-02-14

## 2024-02-10 RX ORDER — AMOXICILLIN AND CLAVULANATE POTASSIUM 875; 125 MG/1; MG/1
1 TABLET, FILM COATED ORAL EVERY 12 HOURS
Qty: 9 TABLET | Refills: 0 | Status: SHIPPED | OUTPATIENT
Start: 2024-02-11 | End: 2024-02-16

## 2024-02-10 RX ORDER — AMOXICILLIN AND CLAVULANATE POTASSIUM 875; 125 MG/1; MG/1
1 TABLET, FILM COATED ORAL ONCE
Status: COMPLETED | OUTPATIENT
Start: 2024-02-10 | End: 2024-02-10

## 2024-02-10 RX ADMIN — AMOXICILLIN AND CLAVULANATE POTASSIUM 1 TABLET: 875; 125 TABLET, FILM COATED ORAL at 23:38

## 2024-02-10 RX ADMIN — AZITHROMYCIN 500 MG: 500 TABLET, FILM COATED ORAL at 23:38

## 2024-02-11 VITALS
SYSTOLIC BLOOD PRESSURE: 130 MMHG | DIASTOLIC BLOOD PRESSURE: 67 MMHG | RESPIRATION RATE: 19 BRPM | OXYGEN SATURATION: 100 % | TEMPERATURE: 98 F | HEART RATE: 88 BPM

## 2024-02-11 NOTE — ED PROVIDER NOTES
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        Prior to Admission Medications   Prescriptions Last Dose Informant Patient Reported? Taking?   FLUoxetine (PROzac) 10 mg capsule  Family Member No No   Si capsule (10 mg total) by Per G Tube route daily   Patient not taking: Reported on 2024   Incontinence Supply Disposable (Comfort Shield Adult Diapers) MISC  Family Member No No   Sig: Use 1 each 4 (four) times a day   Oral Hygiene Products (Toothette Swabs/Dentifrice) SWAB  Family Member No No   Sig: Apply to the mouth or throat 3 (three) times a day   Sennosides (senna) 8.8 mg/5 mL oral syrup  Family Member No No   Sig: 10 mL (17.6 mg total) by Per PEG Tube route daily as needed (Constipation)   Patient not taking: Reported on 10/24/2023   acetylcysteine (MUCOMYST) 200 mg/mL nebulizer solution  Family Member No No   Sig: Take 3 mL (600 mg total) by nebulization every 8 (eight) hours Do not start before 2023.   albuterol (ACCUNEB) 1.25 MG/3ML nebulizer solution   Yes No   Sig: Take 1.25 mg by nebulization every 6 (six) hours as needed for wheezing   apixaban (ELIQUIS) 5 mg  Family Member No No   Si tablet (5 mg total) by Per PEG Tube route 2 (two) times a day   Patient taking differently: 5 mg by Per PEG Tube route 2 (two) times a day Pt taking orally   cholecalciferol (VITAMIN D3) 1,000 units tablet  Family Member No No   Si tablet (1,000 Units total) by Per G Tube route daily   Patient not taking: Reported on 2024   oxygen gas  Family Member Yes No   Sig: Inhale 6 L/min as needed   polyethylene glycol (MIRALAX) 17 g packet  Family Member No No   Si g  by Per PEG Tube route daily as needed (constipation)   Patient not taking: Reported on 2024   thiamine 100 MG tablet  Family Member No No   Si tablet (100 mg total) by Per G Tube route daily   Patient not taking: Reported on 10/24/2023      Facility-Administered Medications: None       Past Medical History:   Diagnosis Date    Anxiety     Cancer (HCC)     Depression     Migration of percutaneous endoscopic gastrostomy (PEG) tube (HCC) 2023    Psychiatric disorder     bipolar       Past Surgical History:   Procedure Laterality Date    IR BIOPSY LYMPH NODE  2023    IR GASTROSTOMY TUBE PLACEMENT  2023    IR LUMBAR PUNCTURE  2023    IR PORT PLACEMENT  3/14/2023    ORCHIECTOMY Left 2022    Procedure: ORCHIECTOMY INGUINAL;  Surgeon: Flip Michael MD;  Location:  MAIN OR;  Service: Urology    PEG W/TRACHEOSTOMY PLACEMENT N/A 2023    Procedure: TRACHEOSTOMY WITH INSERTION PEG TUBE;  Surgeon: Rudy Mcmanus MD;  Location: [facility];  Service: General       Family History   Problem Relation Age of Onset    Coronary artery disease Maternal Aunt      I have reviewed and agree with the history as documented.    E-Cigarette/Vaping    E-Cigarette Use Never User      E-Cigarette/Vaping Substances    Nicotine No     THC Yes     CBD No     Flavoring No     Other No     Unknown No      Social History     Tobacco Use    Smoking status: Former     Current packs/day: 0.50     Average packs/day: 0.5 packs/day for 16.1 years (8.1 ttl pk-yrs)     Types: Cigarettes     Start date: 2008    Smokeless tobacco: Never   Vaping Use    Vaping status: Never Used   Substance Use Topics    Alcohol use: Not Currently     Comment: 5 years ago    Drug use: Not Currently     Types: Marijuana     Comment: Medical marijuana       Review of Systems   Unable to perform ROS: Patient nonverbal       Physical Exam  Physical Exam    Vital Signs  ED Triage Vitals   Temperature Pulse Respirations Blood Pressure  SpO2   02/10/24 2117 02/10/24 2101 02/10/24 2101 02/10/24 2101 02/10/24 2101   97.5 °F (36.4 °C) 77 20 170/79 99 %      Temp Source Heart Rate Source Patient Position - Orthostatic VS BP Location FiO2 (%)   02/10/24 2117 02/10/24 2101 -- -- --   Tympanic Monitor         Pain Score       --                  Vitals:    02/10/24 2101   BP: 170/79   Pulse: 77         Visual Acuity      ED Medications  Medications - No data to display    Diagnostic Studies  Results Reviewed       Procedure Component Value Units Date/Time    UA w Reflex to Microscopic w Reflex to Culture [[id number]]     Lab Status: No result Specimen: Urine     Procalcitonin [[id number]]     Lab Status: No result Specimen: Blood     Lactic acid, plasma (w/reflex if result > 2.0) [[id number]]     Lab Status: No result Specimen: Blood     Basic metabolic panel [[id number]]  (Abnormal) Collected: 02/10/24 2100    Lab Status: Final result Specimen: Blood from Arm, Right Updated: 02/10/24 2123     Sodium 130 mmol/L      Potassium 3.9 mmol/L      Chloride 91 mmol/L      CO2 29 mmol/L      ANION GAP 10 mmol/L      BUN 6 mg/dL      Creatinine 0.82 mg/dL      Glucose 111 mg/dL      Calcium 9.1 mg/dL      eGFR 113 ml/min/1.73sq m     Narrative:      National Kidney Disease Foundation guidelines for Chronic Kidney Disease (CKD):     Stage 1 with normal or high GFR (GFR > 90 mL/min/1.73 square meters)    Stage 2 Mild CKD (GFR = 60-89 mL/min/1.73 square meters)    Stage 3A Moderate CKD (GFR = 45-59 mL/min/1.73 square meters)    Stage 3B Moderate CKD (GFR = 30-44 mL/min/1.73 square meters)    Stage 4 Severe CKD (GFR = 15-29 mL/min/1.73 square meters)    Stage 5 End Stage CKD (GFR <15 mL/min/1.73 square meters)  Note: GFR calculation is accurate only with a steady state creatinine    Blood gas, venous [[id number]]  (Abnormal) Collected: 02/10/24 2100    Lab Status: Final result Specimen: Blood from Arm, Right Updated: 02/10/24 2111     pH, Rasheed 7.259     pCO2, Rasheed 67.8 mm Hg       pO2, Rasheed 27.9 mm Hg      HCO3, Rasheed 29.7 mmol/L      Base Excess, Rasheed 0.9 mmol/L      O2 Content, Rasheed 7.6 ml/dL      O2 HGB, VENOUS 40.8 %     CBC and differential [[id number]]  (Abnormal) Collected: 02/10/24 2100    Lab Status: Final result Specimen: Blood from Arm, Right Updated: 02/10/24 2110     WBC 19.86 Thousand/uL      RBC 4.12 Million/uL      Hemoglobin 12.2 g/dL      Hematocrit 37.9 %      MCV 92 fL      MCH 29.6 pg      MCHC 32.2 g/dL      RDW 14.6 %      MPV 9.0 fL      Platelets 353 Thousands/uL      nRBC 0 /100 WBCs      Neutrophils Relative 87 %      Immat GRANS % 2 %      Lymphocytes Relative 7 %      Monocytes Relative 4 %      Eosinophils Relative 0 %      Basophils Relative 0 %      Neutrophils Absolute 17.15 Thousands/µL      Immature Grans Absolute 0.39 Thousand/uL      Lymphocytes Absolute 1.36 Thousands/µL      Monocytes Absolute 0.84 Thousand/µL      Eosinophils Absolute 0.07 Thousand/µL      Basophils Absolute 0.05 Thousands/µL     FLU/RSV/COVID - if FLU/RSV clinically relevant [[id number]] Collected: 02/10/24 2101    Lab Status: In process Specimen: Nares from Nose Updated: 02/10/24 2107                   XR chest 1 view portable    (Results Pending)   CT chest without contrast    (Results Pending)              Procedures  Procedures         ED Course                               SBIRT 22yo+      Flowsheet Row Most Recent Value   Initial Alcohol Screen: US AUDIT-C     1. How often do you have a drink containing alcohol? 0 Filed at: 02/10/2024 2100   2. How many drinks containing alcohol do you have on a typical day you are drinking?  0 Filed at: 02/10/2024 2100   3a. Male UNDER 65: How often do you have five or more drinks on one occasion? 0 Filed at: 02/10/2024 2100   3b. FEMALE Any Age, or MALE 65+: How often do you have 4 or more drinks on one occassion? 0 Filed at: 02/10/2024 2100   Audit-C Score 0 Filed at: 02/10/2024 2100   JEAN MARIE: How many times in the past year have you...    Used  an illegal drug or used a prescription medication for non-medical reasons? Never Filed at: 02/10/2024 2100                      Medical Decision Making  Amount and/or Complexity of Data Reviewed  Labs: ordered.  Radiology: ordered.             Disposition  Final diagnoses:   None     ED Disposition       None          Follow-up Information    None         Patient's Medications   Discharge Prescriptions    No medications on file       No discharge procedures on file.    PDMP Review       None            ED Provider  Electronically Signed by           the  emergency use of an in vitro diagnostic tests for detection of SARS-CoV-2  virus and/or diagnosis of COVID-19 infection under section 564(b)(1) of  the Act, 21 U.S.C. 360bbb-3(b)(1), unless the authorization is terminated  or revoked sooner. The test has been validated but independent review by FDA  and CLIA is pending.    Test performed using [employer] GeneXpert: This RT-PCR assay targets N2,  a region unique to SARS-CoV-2. A conserved region in the E-gene was chosen  for pan-Sarbecovirus detection which includes SARS-CoV-2.    According to CMS-2020-01-R, this platform meets the definition of high-throughput technology.    Basic metabolic panel [[id number]]  (Abnormal) Collected: 02/10/24 2100    Lab Status: Final result Specimen: Blood from Arm, Right Updated: 02/10/24 2123     Sodium 130 mmol/L      Potassium 3.9 mmol/L      Chloride 91 mmol/L      CO2 29 mmol/L      ANION GAP 10 mmol/L      BUN 6 mg/dL      Creatinine 0.82 mg/dL      Glucose 111 mg/dL      Calcium 9.1 mg/dL      eGFR 113 ml/min/1.73sq m     Narrative:      National Kidney Disease Foundation guidelines for Chronic Kidney Disease (CKD):     Stage 1 with normal or high GFR (GFR > 90 mL/min/1.73 square meters)    Stage 2 Mild CKD (GFR = 60-89 mL/min/1.73 square meters)    Stage 3A Moderate CKD (GFR = 45-59 mL/min/1.73 square meters)    Stage 3B Moderate CKD (GFR = 30-44 mL/min/1.73 square meters)    Stage 4 Severe CKD (GFR = 15-29 mL/min/1.73 square meters)    Stage 5 End Stage CKD (GFR <15 mL/min/1.73 square meters)  Note: GFR calculation is accurate only with a steady state creatinine    Blood gas, venous [[id number]]  (Abnormal) Collected: 02/10/24 2100    Lab Status: Final result Specimen: Blood from Arm, Right Updated: 02/10/24 2111     pH, Rasheed 7.259     pCO2, Rasheed 67.8 mm Hg      pO2, Rasheed 27.9 mm Hg      HCO3, Rasheed 29.7 mmol/L      Base Excess, Rasheed 0.9 mmol/L      O2 Content, Rasheed 7.6 ml/dL      O2 HGB, VENOUS 40.8 %     CBC and differential  [[id number]]  (Abnormal) Collected: 02/10/24 2100    Lab Status: Final result Specimen: Blood from Arm, Right Updated: 02/10/24 2110     WBC 19.86 Thousand/uL      RBC 4.12 Million/uL      Hemoglobin 12.2 g/dL      Hematocrit 37.9 %      MCV 92 fL      MCH 29.6 pg      MCHC 32.2 g/dL      RDW 14.6 %      MPV 9.0 fL      Platelets 353 Thousands/uL      nRBC 0 /100 WBCs      Neutrophils Relative 87 %      Immat GRANS % 2 %      Lymphocytes Relative 7 %      Monocytes Relative 4 %      Eosinophils Relative 0 %      Basophils Relative 0 %      Neutrophils Absolute 17.15 Thousands/µL      Immature Grans Absolute 0.39 Thousand/uL      Lymphocytes Absolute 1.36 Thousands/µL      Monocytes Absolute 0.84 Thousand/µL      Eosinophils Absolute 0.07 Thousand/µL      Basophils Absolute 0.05 Thousands/µL                    CT chest without contrast   Final Result by Naeem Santiago DO (02/10 2230)      Near complete opacification of the bilateral lower lobes which may be secondary to atelectasis and/or aspiration. Underlying pneumonia not excluded.         The study was marked in EPIC for immediate notification.      Workstation performed: [id number]         XR chest 1 view portable   Final Result by Rosalina Ariza MD (02/11 1344)      Bibasilar consolidations are better characterized on same-day CT chest. Please see same-day CT chest report for further details.      Resident: ROSALIAN PARNELL I, the attending radiologist, have reviewed the images and agree with the final report above.      Workstation performed: [id number]                    Procedures  Procedures         ED Course  ED Course as of 02/29/24 0305   Sat Feb 10, 2024   2053 Procedure Note: EKG  Date/Time: 02/10/24 8:50 PM   Interpreted by: David Gay MD  Indications / Diagnosis: Coughing/choking episode  ECG reviewed by me, the ED Physician: yes   The EKG demonstrates:  Rhythm: normal sinus rhythm 90 bpm  Intervals: normal intervals  Axis: normal  axis  QRS/Blocks: normal QRS  ST Changes: Nonspecific ST-T wave changes.  No STEMI.  No significant change compared to prior ECG from 1/3/2024.   2147 CBC and differential(!)  Leukocytosis 19.86.  Normal hemoglobin, hematocrit, and platelet count.  Leukocytosis may be reactive but given patient's comorbid conditions, will evaluate further with additional serum testing including lactic acid and procalcitonin.  No obvious source of infection and no other recent symptoms reported by patient's family members/caretakers, who are at bedside, with the exception of coughing/choking episode today when eating dinner.   2148 Basic metabolic panel(!)  , CL 91, no other acute abnormality on chemistry.   2149 Blood gas, venous(!)  pH 7.25, pCO2 67.8.  Based on review of electronic medical records of prior lab values, patient has had regular fluctuations in his VBG blood gas levels, which is secondary to his chronic tracheostomy dependence and I suspect due to changes of his ventilator settings from pressure support to ventilator. Patient's family notes that they set his ventilator to pressure support while he is able to tolerate it and when he tires they switch to active ventilator settings.   2150 FLU/RSV/COVID - if FLU/RSV clinically relevant  All negative   2210 Leukocytes, UA: Negative   2210 Nitrite, UA: Negative   2210 Blood, UA: Negative   2210 UA with no nitrites, no leukocytes, no hematuria.  Overall benign urinalysis and no signs of infection.   2233 LACTIC ACID: 1.8   2233 Procalcitonin: 0.06   2233 Normal lactic acid, normal procalcitonin.   2234 CT chest without contrast  Near complete opacification of the bilateral lower lobes which may be secondary to atelectasis and/or aspiration. Underlying pneumonia not excluded.   2234 CT chest shows near complete opacification of bilateral lower lobes which may be due to atelectasis and/or aspiration and cannot exclude pneumonia, per radiology report.  Given reported  suspected aspiration while eating and possible underlying component of chronic aspiration due to comorbid conditions, will cover for aspiration pneumonia.                               SBIRT 22yo+      Flowsheet Row Most Recent Value   Initial Alcohol Screen: US AUDIT-C     1. How often do you have a drink containing alcohol? 0 Filed at: 02/10/2024 2100   2. How many drinks containing alcohol do you have on a typical day you are drinking?  0 Filed at: 02/10/2024 2100   3a. Male UNDER 65: How often do you have five or more drinks on one occasion? 0 Filed at: 02/10/2024 2100   3b. FEMALE Any Age, or MALE 65+: How often do you have 4 or more drinks on one occassion? 0 Filed at: 02/10/2024 2100   Audit-C Score 0 Filed at: 02/10/2024 2100   JEAN MARIE: How many times in the past year have you...    Used an illegal drug or used a prescription medication for non-medical reasons? 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  History is slightly limited due to the patient's nonverbal status, and history is provided by family members who live with the patient and help with his care.  The patient is able to feed himself with assistance and forgetting tonight he was eating a hamburger that was cut into quarters.  His family told him to try to eat slower, however he took large bites of his hamburger and then had a brief choking episode.  This concerned his family and they had him brought to the ER for evaluation.    Vital signs reviewed.  See physical exam documentation for exam findings.  Differential diagnosis includes but is not limited to aspiration pneumonitis versus aspiration pneumonia in the setting of choking episode and tracheostomy.  Will  evaluate with labs and chest x-ray.  See ED course for independent interpretation of results.  Chest x-ray shows possible bibasilar consolidations, however study is limited.  Chest CT ordered. Leukocytosis 19.86.  Normal hemoglobin, hematocrit, and platelet count.  Leukocytosis may be reactive but given patient's comorbid conditions, will evaluate further with additional serum testing including lactic acid and procalcitonin.  No obvious source of infection and no other recent symptoms reported by patient's family members/caretakers, who are at bedside, with the exception of coughing/choking episode today when eating dinner. CT chest shows near complete opacification of bilateral lower lobes which may be due to atelectasis and/or aspiration and cannot exclude pneumonia, per radiology report.  Given reported suspected aspiration while eating and possible underlying component of chronic aspiration due to comorbid conditions, will cover for aspiration pneumonia. I discussed all findings, treatment, red flags/return precautions, and outpatient follow-up and the patient/family understand and agree. Stable for discharge.    Amount and/or Complexity of Data Reviewed  Labs: ordered. Decision-making details documented in ED Course.  Radiology: ordered. Decision-making details documented in ED Course.    Risk  Prescription drug management.             Disposition  Final diagnoses:   Aspiration pneumonia (HCC)   Tracheostomy in place (HCC)     Time reflects when diagnosis was documented in both MDM as applicable and the Disposition within this note       Time User Action Codes Description Comment    2/10/2024 10:52 PM David Gay [J69.0] Aspiration pneumonia (HCC)     2/10/2024 10:52 PM David Gay [Z93.0] Tracheostomy in place (HCC)           ED Disposition       ED Disposition   Discharge    Condition   Stable    Date/Time   Sat Feb 10, 2024 [id number]    Comment   Hemanth Swanson discharge to home/self care.                    Follow-up Information       Follow up With Specialties Details Why Contact Info Additional Information    Genaro Helm PA-C Physician Assistant Call in 1 week For follow-up [address] [facility]  [address]  [place] [address] [phone]       [facility] Emergency Department Emergency Medicine Go to  If symptoms worsen [address] [address] [phone] [facility] Emergency Department, [address] [address]            Discharge Medication List as of 2/10/2024 10:56 PM        START taking these medications    Details   amoxicillin-clavulanate (AUGMENTIN) 875-125 mg per tablet Take 1 tablet by mouth every 12 (twelve) hours for 5 days Do not start before February 11, 2024., Starting Sun 2/11/2024, Until Fri 2/16/2024, Normal      azithromycin (ZITHROMAX) 250 mg tablet Take 1 tablet daily x 4 days starting tomorrow night (2/11/24), Normal           CONTINUE these medications which have NOT CHANGED    Details   acetylcysteine (MUCOMYST) 200 mg/mL nebulizer solution Take 3 mL (600 mg total) by nebulization every 8 (eight) hours Do not start before September 29, 2023., Starting Fri 9/29/2023, No Print      albuterol (ACCUNEB) 1.25 MG/3ML nebulizer solution Take 1.25 mg by nebulization every 6 (six) hours as needed for wheezing, Historical Med      apixaban (ELIQUIS) 5 mg 1 tablet (5 mg total) by Per PEG Tube route 2 (two) times a day, Starting Wed 10/18/2023, Normal      cholecalciferol (VITAMIN D3) 1,000 units tablet 1 tablet (1,000 Units total) by Per G Tube route daily, Starting Wed 10/18/2023, Normal      FLUoxetine (PROzac) 10 mg capsule 1 capsule (10 mg total) by Per G Tube route daily, Starting Wed 10/18/2023, Normal      Incontinence Supply Disposable (Comfort Shield Adult Diapers) MISC Use 1 each 4 (four) times a day, Starting Wed 10/18/2023, Normal      Oral Hygiene Products (Toothette Swabs/Dentifrice) SWAB Apply to  the mouth or throat 3 (three) times a day, Starting Wed 10/18/2023, Normal      oxygen gas Inhale 6 L/min as needed, Historical Med      polyethylene glycol (MIRALAX) 17 g packet 17 g by Per PEG Tube route daily as needed (constipation), Starting Sat 9/23/2023, Normal      Sennosides (senna) 8.8 mg/5 mL oral syrup 10 mL (17.6 mg total) by Per PEG Tube route daily as needed (Constipation), Starting Sat 9/23/2023, Normal      thiamine 100 MG tablet 1 tablet (100 mg total) by Per G Tube route daily, Starting Wed 10/18/2023, Normal             No discharge procedures on file.    PDMP Review       None            ED Provider  Electronically Signed by             David Gay MD  02/29/24 3023

## 2024-02-11 NOTE — RESPIRATORY THERAPY NOTE
Pt tracheal suctioned for large amount of thick tan secretions. Pt currently on his ventilator, Vt 455, RR 26, SPO2 98% . Pt michael well.

## 2024-02-13 LAB
ATRIAL RATE: 90 BPM
P AXIS: 55 DEGREES
PR INTERVAL: 164 MS
QRS AXIS: 74 DEGREES
QRSD INTERVAL: 90 MS
QT INTERVAL: 350 MS
QTC INTERVAL: 428 MS
T WAVE AXIS: -4 DEGREES
VENTRICULAR RATE: 90 BPM

## 2024-02-13 PROCEDURE — 93010 ELECTROCARDIOGRAM REPORT: CPT | Performed by: INTERNAL MEDICINE

## 2024-02-21 NOTE — CASE MANAGEMENT
A High Calorie/High Protein diet is recommended to meet your nutritional needs:  Recommend taking two (2) protein shakes daily for one month following discharge from hospital for muscle preservation and healing promotion. Protein shake should contain at least 18 grams protein per shake or more. Consider an unflavored protein powder (Vital Proteins, Isopure or similar store-brand powders) dissolved into hot/warm fluids like tea/coffee, broth, soups, etc.  Consider a high protein/high calorie shake (Ensure Complete, Ensure Plus, Boost High Protein, Boost Very High Calorie or store brand equivalents)    Aim to eat 4-6 smaller, protein-containing meals/snacks daily if you have a smaller appetite to encourage calories and protein throughout the day. Protein sources include eggs, chicken/turkey, fish, lean meats, dairy (if tolerated) - [place] yogurt has more protein than regular yogurt, nuts/nut butters, etc.    A dietitian can help personalize your nutrition plan if you are on a special diet or have difficulty swallowing. Case Management Assessment & Discharge Planning Note    Patient name Abel Nelson  Location ICU 08/ICU 08 MRN [id number]  : 1985 Date 2023       Current Admission Date: 2023  Current Admission Diagnosis:Migration of percutaneous endoscopic gastrostomy (PEG) tube [facility])   Patient Active Problem List    Diagnosis Date Noted   • Migration of percutaneous endoscopic gastrostomy (PEG) tube ([address]) 2023   • Impaired physical mobility 2023   • Multifocal lung consolidation ([address]) 2023   • Motor neuron disease ([address])    • Status post tracheostomy ([address]) 2023   • S/P percutaneous endoscopic gastrostomy (PEG) tube placement ([address]) 2023   • Anxiety 2023   • Chronic respiratory failure with hypoxia and hypercapnia ([address]) 2023   • Acute pulmonary embolism without acute cor pulmonale ([address]) 2023   • History of Wernicke's encephalopathy 2023   • Depression 2023   • Neuromuscular weakness ([address]) 2023   • History of LVH (left ventricular hypertrophy) 2023   • Pure hypertriglyceridemia 2023   • Encephalopathy 2023   • Unilateral vestibular schwannoma ([address]) 2023   • Port-A-Cath in place 2023   • Diplopia 2023   • 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   • Urinary hesitancy    • Umbilical hernia without obstruction and without gangrene 12/10/2022   • Tobacco use 12/10/2022   • Retroperitoneal lymphadenopathy 12/10/2022      LOS (days): 2  Geometric Mean LOS (GMLOS) (days):   Days to GMLOS:     OBJECTIVE:  PATIENT READMITTED TO HOSPITAL  Risk of Unplanned Readmission Score: 33.99         Current admission status: Inpatient  Referral Reason: Other (Discharge disposition)    Preferred Pharmacy:   [facility], [address]  Phone: [phone] Fax: [phone]    Primary Care Provider: Sanna Whitaker MD    Primary Insurance: [facility]  Secondary Insurance:     ASSESSMENT:  Active Health Care Proxies    There are no active Health Care Proxies on file. Readmission Root Cause  30 Day Readmission: Yes  Who directed you to return to the hospital?: [facility]  Did you understand whom to contact if you had questions or problems?: Yes  Were you able to get your prescriptions filled when you left the hospital?: Yes  Did you take your medications as prescribed?: Yes  Were you able to get to your follow-up appointments?: No  Reason[de-identified] Readmitted prior to appointment  During previous admission, was a post-acute recommendation made?: Yes  What post-acute resources were offered?: Other (LTACH was recommended but was refused by family)  Patient was readmitted due to: Dislodged peg tube  Action Plan: Medical management    Patient Information  Admitted from[de-identified] Home  Mental Status: Other (Comment)  During Assessment patient was accompanied by: Not accompanied during assessment  Support Systems: Family members  [place] of Residence: [address] do you live in?: [employer] entry access options. Select all that apply.: Stairs  Number of steps to enter home.: 7  Do the steps have railings?: Yes  In the last 12 months, was there a time when you were not able to pay the mortgage or rent on time?: No  In the last 12 months, was there a time when you did not have a steady place to sleep or slept in a shelter (including now)?: No  Homeless/housing insecurity resource given?: N/A  Living Arrangements: Other (Comment)    Activities of Daily Living Prior to Admission  Functional Status: Total dependent  Completes ADLs independently?: No  Level of ADL dependence: Total Dependent  Ambulates independently?: No  Level of ambulatory dependence:  Total Dependent  Does patient use assisted devices?: Yes    Current Home Health Care  Type of Current Home Care Services: Nurse visit, Home PT    Patient Information Continued  Does patient have prescription coverage?: Yes  Within the past 12 months, you worried that your food would run out before you got the money to buy more.: Never true  Within the past 12 months, the food you bought just didn't last and you didn't have money to get more.: Never true  Food insecurity resource given?: N/A  Does patient receive dialysis treatments?: No     Means of Transportation  Means of Transport to Appts[de-identified] Family transport  In the past 12 months, has lack of transportation kept you from medical appointments or from getting medications?: No  In the past 12 months, has lack of transportation kept you from meetings, work, or from getting things needed for daily living?: No  Was application for public transport provided?: N/A    DISCHARGE DETAILS:    Other Referral/Resources/Interventions Provided:  Referral Comments: CM received voicemail from patient's mother Ileana Calzada asking when will the patient be discharged. CM reached out to Critical Care Advanced Practitioner Portia Rodas and Attending physician Dr. James Arizmendi regarding patient's discharge and they stated patient is stable to be discharged from hospital but voiced concerns about discharge to home and continue to recommend [facility], [facility] for a safe discharge. It was agreed to have a care team meeting with family when they visit this afternoon. CM sent referral via [facility] to [facility] for resuming services. [facility] reached out voicing  concerns about patient's safe discharge plan and supplies. Conference call via Teams held between [facility] [facility] and [facility] including [facility] and was decided upon to have [address] called over phone for the care team meeting and [address]. RISHABH then returned patient's mother Steph's phone call and informed about having a care team meeting and she asked CM to call patient's sister Celeste Ritchie.  CM called patient's sister Celeste Ritchie to explain the concerns and requested a care team meeting here in the hospital with the medical team, resp manager & [facility] manager. Chris Silva was upset and did not like the idea of having another care team meeting when family has already decided to take patient home and care for  him in home. CM explained the reason of the care meeting and that the healthcare providers only want the best and safe discharge for patient and  will continue to recommend LTACH for sometime till he is more stable for home discharge. and also that vna wanted to talk to  her prior visit in home. Chris Silva asked about vna's concerns and CM explained about the limited visits as approved by insurance. Chris Silva stated she understands that and is aware that vna will be coming to teach family how to care for trach and peg and therefore there is no reason to have a care team meeting. CM got a call again from patient's mother Norberto Oviedo asking why is CM forcefully sending patient to a nursing home. CM clarified the misunderstanding to Norberto Oviedo and stated the exact conversation between CM and sister Chris Silva and reiterated to her about a safe discharge plan. Norberto Oviedo demanded that patient be discharged to home today. CM attempted to explain that [facility] is not able to visit today and therefore CM will request for transportation for next day 9/29 and will reach out to [facility]. Norberto Oviedo insisted that patient be discharged today. CM made the medical team aware of the same and requested some extra supplies be sent with patient at discharge (CM spoke with [facility] regarding the same. CM also called Flavio Hamm at [address] [place] [address] regarding supplies and he informed CM that the right size inner cannula (sz 8) was delivered to patient's house yesterday 9/27.)     Treatment Team Recommendation: [facility], [facility]  Discharge Destination Plan[de-identified] Home with [address] [facility] at Discharge : [facility]  Dispatcher Contacted: Yes  Number/Name of Dispatcher: [employer]  Transported by [employer] and Unit #):  MICKEY [phone]  ETA of Transport (Date): 09/28/23  ETA of Transport (Time): 1900

## 2024-03-08 ENCOUNTER — APPOINTMENT (EMERGENCY)
Dept: RADIOLOGY | Facility: HOSPITAL | Age: 39
End: 2024-03-08
Payer: COMMERCIAL

## 2024-03-08 ENCOUNTER — HOSPITAL ENCOUNTER (EMERGENCY)
Facility: HOSPITAL | Age: 39
Discharge: HOME | End: 2024-03-08
Attending: EMERGENCY MEDICINE
Payer: COMMERCIAL

## 2024-03-08 VITALS
HEART RATE: 57 BPM | TEMPERATURE: 98.9 F | SYSTOLIC BLOOD PRESSURE: 150 MMHG | OXYGEN SATURATION: 95 % | RESPIRATION RATE: 20 BRPM | DIASTOLIC BLOOD PRESSURE: 89 MMHG

## 2024-03-08 DIAGNOSIS — K94.23 PEG TUBE MALFUNCTION (HCC): Primary | ICD-10-CM

## 2024-03-08 PROCEDURE — 99283 EMERGENCY DEPT VISIT LOW MDM: CPT

## 2024-03-08 PROCEDURE — 99284 EMERGENCY DEPT VISIT MOD MDM: CPT | Performed by: EMERGENCY MEDICINE

## 2024-03-08 PROCEDURE — 43762 RPLC GTUBE NO REVJ TRC: CPT | Performed by: EMERGENCY MEDICINE

## 2024-03-08 PROCEDURE — 74018 RADEX ABDOMEN 1 VIEW: CPT

## 2024-03-08 RX ADMIN — IOHEXOL 40 ML: 240 INJECTION, SOLUTION INTRATHECAL; INTRAVASCULAR; INTRAVENOUS; ORAL at 14:45

## 2024-03-08 NOTE — ED PROVIDER NOTES
History  Chief Complaint   Patient presents with    Feeding Tube Problem     Pt presents to ED via EMS from home w/ feeding tubing dislodged.       38-year-old male history of neuromuscular disorder, cancer, tracheostomy, G-tube placed in September per review of records.  Presents for displaced G-tube.    Unable to provide history as patient is nonverbal.  He can give me thumbs up and thumbs down.  Does not appear to be in distress.    Comes with a 18 French G-tube which is still partially in his skin.          Feeding Tube Problem  Severity:  Unable to specify  Onset quality:  Unable to specify  Timing:  Unable to specify  Progression:  Unable to specify  Chronicity:  New      Prior to Admission Medications   Prescriptions Last Dose Informant Patient Reported? Taking?   FLUoxetine (PROzac) 10 mg capsule  Family Member No No   Si capsule (10 mg total) by Per G Tube route daily   Patient not taking: Reported on 2024   Incontinence Supply Disposable (Comfort Shield Adult Diapers) MISC  Family Member No No   Sig: Use 1 each 4 (four) times a day   Oral Hygiene Products (Toothette Swabs/Dentifrice) SWAB  Family Member No No   Sig: Apply to the mouth or throat 3 (three) times a day   Sennosides (senna) 8.8 mg/5 mL oral syrup  Family Member No No   Sig: 10 mL (17.6 mg total) by Per PEG Tube route daily as needed (Constipation)   Patient not taking: Reported on 10/24/2023   acetylcysteine (MUCOMYST) 200 mg/mL nebulizer solution  Family Member No No   Sig: Take 3 mL (600 mg total) by nebulization every 8 (eight) hours Do not start before 2023.   albuterol (ACCUNEB) 1.25 MG/3ML nebulizer solution   Yes No   Sig: Take 1.25 mg by nebulization every 6 (six) hours as needed for wheezing   apixaban (ELIQUIS) 5 mg  Family Member No No   Si tablet (5 mg total) by Per PEG Tube route 2 (two) times a day   Patient taking differently: 5 mg by Per PEG Tube route 2 (two) times a day Pt taking orally    cholecalciferol (VITAMIN D3) 1,000 units tablet  Family Member No No   Si tablet (1,000 Units total) by Per G Tube route daily   Patient not taking: Reported on 2024   oxygen gas  Family Member Yes No   Sig: Inhale 6 L/min as needed   polyethylene glycol (MIRALAX) 17 g packet  Family Member No No   Si g by Per PEG Tube route daily as needed (constipation)   Patient not taking: Reported on 2024   thiamine 100 MG tablet  Family Member No No   Si tablet (100 mg total) by Per G Tube route daily   Patient not taking: Reported on 10/24/2023      Facility-Administered Medications: None       Past Medical History:   Diagnosis Date    Anxiety     Cancer (HCC)     Depression     Migration of percutaneous endoscopic gastrostomy (PEG) tube (HCC) 2023    Psychiatric disorder     bipolar       Past Surgical History:   Procedure Laterality Date    IR BIOPSY LYMPH NODE  2023    IR GASTROSTOMY TUBE PLACEMENT  2023    IR LUMBAR PUNCTURE  2023    IR PORT PLACEMENT  3/14/2023    ORCHIECTOMY Left 2022    Procedure: ORCHIECTOMY INGUINAL;  Surgeon: Flip Michael MD;  Location:  MAIN OR;  Service: Urology    PEG W/TRACHEOSTOMY PLACEMENT N/A 2023    Procedure: TRACHEOSTOMY WITH INSERTION PEG TUBE;  Surgeon: Rudy Mcmanus MD;  Location: [facility] OR;  Service: General       Family History   Problem Relation Age of Onset    Coronary artery disease Maternal Aunt      I have reviewed and agree with the history as documented.    E-Cigarette/Vaping    E-Cigarette Use Never User      E-Cigarette/Vaping Substances    Nicotine No     THC Yes     CBD No     Flavoring No     Other No     Unknown No      Social History     Tobacco Use    Smoking status: Former     Current packs/day: 0.50     Average packs/day: 0.5 packs/day for 16.2 years (8.1 ttl pk-yrs)     Types: Cigarettes     Start date: 2008    Smokeless tobacco: Never   Vaping Use    Vaping status: Never Used   Substance Use  Topics    Alcohol use: Not Currently     Comment: 5 years ago    Drug use: Not Currently     Types: Marijuana     Comment: Medical marijuana       Review of Systems   Unable to perform ROS: Other (trach with ventilator)       Physical Exam  Physical Exam  Vitals and nursing note reviewed.   Constitutional:       General: He is not in acute distress.     Appearance: He is well-developed. He is not diaphoretic.   HENT:      Head: Normocephalic and atraumatic.      Right Ear: External ear normal.      Left Ear: External ear normal.   Eyes:      Conjunctiva/sclera: Conjunctivae normal.   Neck:      Trachea: No tracheal deviation.   Cardiovascular:      Rate and Rhythm: Normal rate and regular rhythm.      Heart sounds: Normal heart sounds. No murmur heard.  Pulmonary:      Effort: No respiratory distress.      Breath sounds: Normal breath sounds. No stridor. No wheezing or rales.   Abdominal:      General: Bowel sounds are normal. There is no distension.      Palpations: Abdomen is soft. There is no mass.      Tenderness: There is no abdominal tenderness. There is no guarding or rebound.      Comments: G-tube is nearly out of the skin.  The balloon is inflated with only approximately 2 cc of fluid and is located just below the skin.   Musculoskeletal:         General: No tenderness or deformity.   Skin:     General: Skin is dry.      Findings: No rash.   Neurological:      Motor: No abnormal muscle tone.      Coordination: Coordination normal.   Psychiatric:         Behavior: Behavior normal.         Thought Content: Thought content normal.         Judgment: Judgment normal.         Vital Signs  ED Triage Vitals [03/08/24 1430]   Temperature Pulse Respirations Blood Pressure SpO2   98.9 °F (37.2 °C) 57 20 150/89 95 %      Temp Source Heart Rate Source Patient Position - Orthostatic VS BP Location FiO2 (%)   Oral -- -- -- --      Pain Score       --           Vitals:    03/08/24 1430   BP: 150/89   Pulse: 57          Visual Acuity      ED Medications  Medications   iohexol (OMNIPAQUE) 240 MG/ML solution 50 mL (40 mL Intravenous Given 3/8/24 1445)       Diagnostic Studies  Results Reviewed       None                   XR abdomen 1 view kub   ED Interpretation by Jose M Zamora DO (03/08 3424)   Contrast bolus appears to correctly go into the intestines.  Radiopaque area noted to the superior to G tube balloon.  Unsure exact etiology.  Will have radiology read      Final Result by Danilo Patiño MD (03/08 8873)      Injected contrast is within the duodenum as well as the gastric fundus indicating that the tip of the gastrostomy tube is intraluminal.               Workstation performed: [id number]                    Procedures  Feeding Tube    Date/Time: 3/8/2024 3:05 PM    Performed by: Jose M Zamora DO  Authorized by: Jose M Zamora DO  Universal Protocol:  Consent: The procedure was performed in an emergent situation. Verbal consent not obtained. Written consent not obtained.  Consent given by: patient  Patient identity confirmed: verbally with patient    Patient location:  ED  Pre-procedure details:     Old tube type:  Gastrostomy    Old tube size:  18 Fr  Indications:     Indications: tube dislodged    Anesthesia (see MAR for exact dosages):     Anesthesia method:  None  Procedure details:     Patient position:  Recumbent    Procedure type:  Replacement    Tube type:  Gastrostomy    Tube size:  18 Fr    Bulb inflation volume:  8    Bulb inflation fluid:  Normal saline  Post-procedure details:     Placement/position confirmation:  Auscultation, gastric contents aspirated, x-ray and contrast    Placement difficulty:  Minimal    Bleeding:  None    Patient tolerance of procedure:  Tolerated well, no immediate complications           ED Course                                             Medical Decision Making  Patient presenting for G-tube displacement.  No other complaints per nursing home and patient is  in no distress.    I replaced the G-tube after verifying that it been present since September of last year.    Obtained x-ray to evaluate placement.  Contrast appears to be going to the appropriate place however there is a radiographic opacity just superior which I am unable to decipher why it exists.  Will have radiology read x-ray.    No signs of infection or significant injection of feeding contents under the skin.    X-ray with G-tube in correct position.  Will discharge back to nursing home.    Problems Addressed:  PEG tube malfunction (HCC): acute illness or injury    Amount and/or Complexity of Data Reviewed  Radiology: ordered and independent interpretation performed.    Risk  Prescription drug management.             Disposition  Final diagnoses:   PEG tube malfunction (HCC)     Time reflects when diagnosis was documented in both MDM as applicable and the Disposition within this note       Time User Action Codes Description Comment    3/8/2024  3:31 PM Jose M Zamora Add [K94.23] PEG tube malfunction (HCC)           ED Disposition       ED Disposition   Discharge    Condition   Stable    Date/Time   Fri Mar 8, 2024  3:31 PM    Comment   Hemanth Swanson discharge to home/self care.                   Follow-up Information       Follow up With Specialties Details Why Contact Info Additional Information    [facility] Emergency Department Emergency Medicine  If symptoms worsen [address] [address]  [phone] [facility] Emergency Department, [address] [place] [phone]            Patient's Medications   Discharge Prescriptions    No medications on file       No discharge procedures on file.    PDMP Review       None            ED Provider  Electronically Signed by             Jose M Zamora DO  03/08/24 9778

## 2024-03-08 NOTE — RESPIRATORY THERAPY NOTE
Pt was brought in by EMS, trach intact and on a ventilator. Pt suctioned, scant secretions obtain.

## 2024-03-08 NOTE — DISCHARGE INSTRUCTIONS
Follow-up with gastroenterology if you would like to have your G-tube removed.    Come back for new or worsening symptoms.

## 2024-03-15 DIAGNOSIS — Z93.1 S/P PERCUTANEOUS ENDOSCOPIC GASTROSTOMY (PEG) TUBE PLACEMENT (HCC): Primary | ICD-10-CM

## 2024-03-18 ENCOUNTER — OFFICE VISIT (OUTPATIENT)
Dept: PULMONOLOGY | Facility: CLINIC | Age: 39
End: 2024-03-18
Payer: COMMERCIAL

## 2024-03-18 VITALS
TEMPERATURE: 94.9 F | OXYGEN SATURATION: 100 % | SYSTOLIC BLOOD PRESSURE: 122 MMHG | HEART RATE: 77 BPM | DIASTOLIC BLOOD PRESSURE: 60 MMHG

## 2024-03-18 DIAGNOSIS — Z93.0 STATUS POST TRACHEOSTOMY (HCC): ICD-10-CM

## 2024-03-18 DIAGNOSIS — J96.11 CHRONIC RESPIRATORY FAILURE WITH HYPOXIA AND HYPERCAPNIA (HCC): Primary | ICD-10-CM

## 2024-03-18 DIAGNOSIS — J96.12 CHRONIC RESPIRATORY FAILURE WITH HYPOXIA AND HYPERCAPNIA (HCC): Primary | ICD-10-CM

## 2024-03-18 DIAGNOSIS — I26.90 ACUTE SEPTIC PULMONARY EMBOLISM WITHOUT ACUTE COR PULMONALE (HCC): ICD-10-CM

## 2024-03-18 PROCEDURE — 31502 CHANGE OF WINDPIPE AIRWAY: CPT | Performed by: STUDENT IN AN ORGANIZED HEALTH CARE EDUCATION/TRAINING PROGRAM

## 2024-03-18 PROCEDURE — 99215 OFFICE O/P EST HI 40 MIN: CPT | Performed by: STUDENT IN AN ORGANIZED HEALTH CARE EDUCATION/TRAINING PROGRAM

## 2024-03-18 NOTE — PROGRESS NOTES
Pulmonary Outpatient Progress Note   Hemanth Swanson 38 y.o. male MRN: [id number]  3/18/2024      No problem-specific Assessment & Plan notes found for this encounter.      Assessment:    Ventilator dependent respiratory failure from an undiagnosed neurological condition.  His tracheostomy was performed in September at [facility] by general surgery, initially 8. 0 cuffed, over the next few months he was downsized to a Shiley 4 then capping trial.  Several days into the capping trial he developed aspiration pneumonia and ended up being hospitalized at [facility] with a upsized tracheostomy to 8.0, he had a bronchoscopy performed and required full ventilator support at that time.  Since then family initially opted to go back to [facility] but changed her mind and came back to [facility].  Last time I saw him he was on full vent support, and we adjusted him to do BiPAP in the daytime and vent support at nighttime.  After discussing with the patient's sister today, it sounds like he is not ready for independence.  He does have episodes where he is hypoxic and turned blue while on BiPAP at times.  For now he should stay on BiPAP in the daytime and vent nightly for the foreseeable future.  We will refer him to pulmonary rehab to help him exercise and stay active and hopefully this will assist in his decannulation effort. Vent AC VC with tidal volumes around 550, respiratory rate 24 with minute ventilation 12 to 14 L, 100% spontaneous triggers.   Acute intermediate low risk pulmonary embolism on Eliquis its coming up on 6 months of treatment but he is somewhat immobile at times. I would favor treating with Eliquis for now and we can readdress at the next appointment  PEG tube dependent, was working with speech therapy but now has graduated.  It seems that he may have taken 1 step backwards and now is using puréed diet    Plan:    Tracheostomy exchange in the clinic with a 6 cuffed  Continue the same  BiPAP settings in the daytime and vent nightly  Referral to pulmonary rehab and once he completes this we can readdress if he is ready to be on BiPAP 24/7  Continue Eliquis and we can readdress at the next appointment  Continue Ena   No longer using chest PT or vest therapy  Follow-up in 3 months with me  I have spent a total time of 42 minutes on 03/18/24 in caring for this patient including Diagnostic results, Prognosis, Risks and benefits of tx options, Instructions for management, Patient and family education, Importance of tx compliance, Impressions, Counseling / Coordination of care, Documenting in the medical record, Reviewing / ordering tests, medicine, procedures  , Obtaining or reviewing history  , and Communicating with other healthcare professionals .      History of Present Illness   HPI:    38-year-old gentleman here for follow-up.  He has a past medical history of undiagnosed neuromuscular disease admitted initially to [facility] and had difficulty weaning off the ventilator therefore required 8 oh Shiley cuffed tracheostomy, PEG tube insertion.  He was discharged home as a family refused LTAC or nursing facility.  He came back to the hospital 1 day later due to PEG tube malfunction.  Eventually after this was adjusted and fixed by interventional radiology, he was discharged home again.  Since then he has been initially following up with me in October November where we were working towards decannulation he was initially at 8.0 Shiley and eventually 6.0 Shiley and then 4.0 Shiley then we did trach trial followed by capping trial.  During the capping trial he had a setback with pneumonia and was admitted to [facility] with a performed upsizing of tracheostomy back to a point now was in the ICU on full ventilator support.  In November was his admission to [facility], since then he has been on full vent support.      Patient now here for follow-up, at her last appointment we made  him BiPAP in the daytime and ventilator nightly.  He is no longer require chest PT or vest therapy.  He does use albuterol and his Eliquis for his PE.  Aside from that he does have episodes of desaturation while on BiPAP, according to his sister he does turn blue at times.    Currently in the clinic he has a air leak from his tracheostomy, he has significant volume loss but he is saturating in the 90s.  His tracheostomy was exchanged to a new 6.0.    No recent PFT on file  No recent ABG on file      Review of Systems   Constitutional:  Positive for activity change.   HENT:  Positive for trouble swallowing and voice change.    Eyes: Negative.    Respiratory:  Positive for cough. Negative for wheezing.    Cardiovascular: Negative.    Gastrointestinal: Negative.    Endocrine: Negative.    Genitourinary: Negative.    Musculoskeletal: Negative.    Skin: Negative.    Allergic/Immunologic: Negative.    Neurological:  Positive for speech difficulty. Negative for dizziness.   Hematological: Negative.    Psychiatric/Behavioral: Negative.          Historical Information   Past Medical History:   Diagnosis Date   • Anxiety    • Cancer ([facility])    • Depression    • Migration of percutaneous endoscopic gastrostomy (PEG) tube ([facility]) 09/24/2023   • Psychiatric disorder     bipolar     Past Surgical History:   Procedure Laterality Date   • IR BIOPSY LYMPH NODE  1/20/2023   • IR GASTROSTOMY TUBE PLACEMENT  9/25/2023   • IR LUMBAR PUNCTURE  9/6/2023   • IR PORT PLACEMENT  3/14/2023   • ORCHIECTOMY Left 12/14/2022    Procedure: ORCHIECTOMY INGUINAL;  Surgeon: Flip Michael MD;  Location:  MAIN OR;  Service: Urology   • PEG W/TRACHEOSTOMY PLACEMENT N/A 9/8/2023    Procedure: TRACHEOSTOMY WITH INSERTION PEG TUBE;  Surgeon: Rudy Mcmanus MD;  Location: [facility] MAIN OR;  Service: General     Family History   Problem Relation Age of Onset   • Coronary artery disease Maternal Aunt    • Cancer Father    • Diabetes Father    • Hypertension  Father      Social History     Tobacco Use   Smoking Status Former   • Current packs/day: 0.50   • Average packs/day: 0.5 packs/day for 16.2 years (8.1 ttl pk-yrs)   • Types: Cigarettes   • Start date: 1/1/2008   Smokeless Tobacco Never       Occupational History: disabled     Meds/Allergies     Current Outpatient Medications:   •  acetylcysteine (MUCOMYST) 200 mg/mL nebulizer solution, Take 3 mL (600 mg total) by nebulization every 8 (eight) hours Do not start before September 29, 2023., Disp: , Rfl: 0  •  albuterol (ACCUNEB) 1.25 MG/3ML nebulizer solution, Take 1.25 mg by nebulization every 6 (six) hours as needed for wheezing, Disp: , Rfl:   •  apixaban (ELIQUIS) 5 mg, 1 tablet (5 mg total) by Per PEG Tube route 2 (two) times a day (Patient taking differently: 5 mg by Per PEG Tube route 2 (two) times a day Pt taking orally), Disp: 60 tablet, Rfl: 2  •  cholecalciferol (VITAMIN D3) 1,000 units tablet, 1 tablet (1,000 Units total) by Per G Tube route daily (Patient not taking: Reported on 1/30/2024), Disp: 30 tablet, Rfl: 0  •  FLUoxetine (PROzac) 10 mg capsule, 1 capsule (10 mg total) by Per G Tube route daily (Patient not taking: Reported on 1/30/2024), Disp: 30 capsule, Rfl: 2  •  Incontinence Supply Disposable (Comfort Shield Adult Diapers) MISC, Use 1 each 4 (four) times a day, Disp: 48 each, Rfl: 5  •  Oral Hygiene Products (Toothette Swabs/Dentifrice) SWAB, Apply to the mouth or throat 3 (three) times a day, Disp: 1000 each, Rfl: 1  •  oxygen gas, Inhale 6 L/min as needed, Disp: , Rfl:   •  polyethylene glycol (MIRALAX) 17 g packet, 17 g by Per PEG Tube route daily as needed (constipation) (Patient not taking: Reported on 1/30/2024), Disp: 10 each, Rfl: 0  •  Sennosides (senna) 8.8 mg/5 mL oral syrup, 10 mL (17.6 mg total) by Per PEG Tube route daily as needed (Constipation) (Patient not taking: Reported on 10/24/2023), Disp: 236 mL, Rfl: 0  •  thiamine 100 MG tablet, 1 tablet (100 mg total) by Per G Tube  "route daily (Patient not taking: Reported on 10/24/2023), Disp: 30 tablet, Rfl: 2  No Known Allergies    Vitals: Blood pressure 122/60, pulse 77, temperature (!) 94.9 °F (34.9 °C), temperature source Tympanic, SpO2 100%., There is no height or weight on file to calculate BMI. Oxygen Therapy  SpO2: 100 %  Oxygen Therapy: Supplemental oxygen  O2 Delivery Method: Nasal cannula  O2 Flow Rate (L/min): 6 L/min    Physical Exam:    GEN: alert and oriented x 3, pleasant and cooperative   HEENT:  Normocephalic, atraumatic, anicteric  NECK: No JVD, trach site clear   HEART: Rate, normal S1 and S2  LUNGS: Clear to auscultation bilaterally; no wheezes, rales, or rhonchi; respiration nonlabored   ABDOMEN:  Normoactive bowel sounds, soft, no tenderness, no distention  EXTREMITIES: peripheral pulses palpable; no edema  NEURO: no gross focal findings; cranial nerves grossly intact   SKIN:  Dry, intact, warm to touch    Labs: I have personally reviewed pertinent lab results.  No results found for: \"IGE\"    Imaging and other studies: I have personally reviewed pertinent films in PACS    Pulmonary function testing:  NA    Other Studies: I have personally reviewed pertinent reports.      Yessy Barksdale MD  Pulmonary and Critical Care Medicine     "

## 2024-03-18 NOTE — PATIENT INSTRUCTIONS
It was a pleasure seeing you today!    Refer to pulmonary rehab  We changed the trach in office   Follow up in 3 months     Yessy Barksdale MD  Pulmonary and Critical Care Medicine

## 2024-03-18 NOTE — PROGRESS NOTES
Tracheostomy Replacement    Date/Time: 3/18/2024 3:30 PM    Performed by: Yessy Barksdale MD  Authorized by: Yessy Barksdale MD    Patient Location:  Bedside and other (comment)  Other Assisting Provider: No    Verbal consent obtained?: Yes    Risks and benefits: Risks, benefits and alternatives were discussed    Consent given by:  Patient  Time out: Immediately prior to the procedure a time out was called    Indications:  Malfunction  Procedure type:  Tube change  Tracheostomy status: Fistula tract established?: Yes    Local anesthesia used?: No    Preparation: Patient was prepped and draped in usual sterile fashion    Tube type:  Fenestrated  Tube cuff:  Single cuff  Tube size (mm):  6.0  Approach:  External  Approach location:  External  Cuff inflation:  Deflated  Cuff type:  Air  Cuff inflation technique: minimum leak technique used    Patient tolerance:  Patient tolerated the procedure well with no immediate complications   Patient now has a Shiley 6 CN75H, inner diameter 7.5 mm, outer diameter 10.8 mm  Tracheostomy was exchanged in the clinic as he had a air leak from his cough

## 2024-04-12 ENCOUNTER — TELEPHONE (OUTPATIENT)
Age: 39
End: 2024-04-12

## 2024-04-12 NOTE — TELEPHONE ENCOUNTER
Pulmonary    I called patient and spoke to Dmitry, she is suctioning him frequently. No fevers, the sputum is relatively clear (baseline for him). RT stated they may lower the humidifier temperature - that's one option. He uses nebulizer albuterol TID along with saline bullets TID. He refuses cough assist device which would probably give him the most benefit.     Plan  No CXR indicated   No antimicrobials indicated unless there is a fever or change in color of his sputum  Keep using nebulizer TID  Conservative management for now    Yessy Barksdale MD  Pulmonary and Critical Care Medicine

## 2024-04-12 NOTE — TELEPHONE ENCOUNTER
Pt stated Pulm Provider: Dr. Barksdale    Actionable item: Advise on Increased Secretions in Trach Inner Canula    What is the reason for the call/chief complaint?    Pt's sister, Dmitry, stating patient having increased clear/white secretions in trach. States when she suctions not able to get anything, but keeps collecting in inner canula. Changing inner cannula many times a days, already had to reorder supplies. Reviewed suction technique, stated she decreased the humidification, she is making sure oral secretions are being suctioned out too.    Priority:  HIGH

## 2024-04-24 DIAGNOSIS — J47.9 BRONCHIECTASIS WITHOUT COMPLICATION (HCC): Primary | ICD-10-CM

## 2024-04-24 PROBLEM — R68.89 COPIOUS ORAL SECRETIONS: Status: ACTIVE | Noted: 2024-04-24

## 2024-05-02 ENCOUNTER — TELEPHONE (OUTPATIENT)
Age: 39
End: 2024-05-02

## 2024-05-02 NOTE — TELEPHONE ENCOUNTER
Called and spoke to Migdalia, she stated that [facility] received the Vest order and insurance would like our office to call them and cancel the order so [employer] can process the orders and get the Vest deliver to the patient. Insurance unable to process the order from both company. Please contact [facility] at [phone].

## 2024-05-02 NOTE — TELEPHONE ENCOUNTER
Called [employer] and   Spoke to Marybeth, and made her aware to cancel the order. Confirmed that order is canceled.        Please call pt and make apt to see Dr. Salinas. Pt had SHERRY at [facility] on 2/16/21 w/Dr. Salinas. Thank you

## 2024-05-06 ENCOUNTER — TELEPHONE (OUTPATIENT)
Age: 39
End: 2024-05-06

## 2024-05-08 ENCOUNTER — TELEPHONE (OUTPATIENT)
Dept: NEUROLOGY | Facility: CLINIC | Age: 39
End: 2024-05-08

## 2024-05-08 NOTE — TELEPHONE ENCOUNTER
"Last visit Dr. Lomeli, 10/26; office note documents: Return for Recheck. Schedule follow up with neuromuscular ,     I spoke to patient's sister, Dmitry, who is reporting patient has increased muscle weakness now in upper extremities.  Describes as having \"difficulty picking up cups and feeding himself.\"     Dr. Lomeli: Patient is scheduled for EMG of lower extremities only. Please provide recommendation.    Clerical Team: Per Dr. Lomeli office note, please schedule f/u with neuromuscular specialist.  Thank you.  "

## 2024-05-09 NOTE — TELEPHONE ENCOUNTER
Patient`s sister called in to Schedule appt for patient. Patient is now schedule with Dr. Malaika Veloz 6/27/24 per notes from Dr. Lomeli recommendations.

## 2024-05-14 NOTE — TELEPHONE ENCOUNTER
Called pts sister to offer sooner appt in  on 6/4. Pts sister said Pt can only go to [place] for an appt due to pt being on oxygen. I did let her know that would push the appt out further. Moved appt to 7/17 @ 12:30 with Dr. Dc and added pt to wait list.

## 2024-05-24 LAB
DME PARACHUTE DELIVERY DATE ACTUAL: NORMAL
DME PARACHUTE DELIVERY DATE REQUESTED: NORMAL
DME PARACHUTE ITEM DESCRIPTION: NORMAL
DME PARACHUTE ORDER STATUS: NORMAL
DME PARACHUTE SUPPLIER NAME: NORMAL
DME PARACHUTE SUPPLIER PHONE: NORMAL

## 2024-05-30 ENCOUNTER — APPOINTMENT (EMERGENCY)
Dept: RADIOLOGY | Facility: HOSPITAL | Age: 39
DRG: 720 | End: 2024-05-30
Payer: COMMERCIAL

## 2024-05-30 ENCOUNTER — APPOINTMENT (EMERGENCY)
Dept: CT IMAGING | Facility: HOSPITAL | Age: 39
DRG: 720 | End: 2024-05-30
Payer: COMMERCIAL

## 2024-05-30 ENCOUNTER — HOSPITAL ENCOUNTER (INPATIENT)
Facility: HOSPITAL | Age: 39
LOS: 11 days | DRG: 720 | End: 2024-06-10
Attending: EMERGENCY MEDICINE | Admitting: INTERNAL MEDICINE
Payer: COMMERCIAL

## 2024-05-30 ENCOUNTER — OFFICE VISIT (OUTPATIENT)
Dept: FAMILY MEDICINE CLINIC | Facility: CLINIC | Age: 39
End: 2024-05-30
Payer: COMMERCIAL

## 2024-05-30 VITALS
OXYGEN SATURATION: 95 % | DIASTOLIC BLOOD PRESSURE: 78 MMHG | RESPIRATION RATE: 20 BRPM | HEART RATE: 74 BPM | WEIGHT: 288 LBS | BODY MASS INDEX: 35.06 KG/M2 | SYSTOLIC BLOOD PRESSURE: 114 MMHG

## 2024-05-30 DIAGNOSIS — R74.8 ELEVATED ALKALINE PHOSPHATASE LEVEL: ICD-10-CM

## 2024-05-30 DIAGNOSIS — J96.12 CHRONIC RESPIRATORY FAILURE WITH HYPOXIA AND HYPERCAPNIA (HCC): ICD-10-CM

## 2024-05-30 DIAGNOSIS — R09.02 HYPOXIA: ICD-10-CM

## 2024-05-30 DIAGNOSIS — J96.11 CHRONIC RESPIRATORY FAILURE WITH HYPOXIA AND HYPERCAPNIA (HCC): ICD-10-CM

## 2024-05-30 DIAGNOSIS — J04.10 TRACHEITIS: ICD-10-CM

## 2024-05-30 DIAGNOSIS — L89.90 PRESSURE ULCER: ICD-10-CM

## 2024-05-30 DIAGNOSIS — Z74.09 IMPAIRED PHYSICAL MOBILITY: ICD-10-CM

## 2024-05-30 DIAGNOSIS — Z99.11 VENTILATOR DEPENDENT (HCC): ICD-10-CM

## 2024-05-30 DIAGNOSIS — K42.0 IRREDUCIBLE UMBILICAL HERNIA: ICD-10-CM

## 2024-05-30 DIAGNOSIS — G62.89 MIXED AXONAL-DEMYELINATING POLYNEUROPATHY: Primary | ICD-10-CM

## 2024-05-30 DIAGNOSIS — J18.9 SEPSIS DUE TO PNEUMONIA (HCC): ICD-10-CM

## 2024-05-30 DIAGNOSIS — J18.1 MULTIFOCAL LUNG CONSOLIDATION (HCC): ICD-10-CM

## 2024-05-30 DIAGNOSIS — J69.0 ASPIRATION PNEUMONIA, UNSPECIFIED ASPIRATION PNEUMONIA TYPE, UNSPECIFIED LATERALITY, UNSPECIFIED PART OF LUNG (HCC): Primary | ICD-10-CM

## 2024-05-30 DIAGNOSIS — R56.9 SEIZURE-LIKE ACTIVITY (HCC): ICD-10-CM

## 2024-05-30 DIAGNOSIS — A41.9 SEPSIS DUE TO PNEUMONIA (HCC): ICD-10-CM

## 2024-05-30 DIAGNOSIS — G70.9 NEUROMUSCULAR WEAKNESS (HCC): ICD-10-CM

## 2024-05-30 LAB
2HR DELTA HS TROPONIN: <-2 NG/L
ALBUMIN SERPL BCP-MCNC: 3.8 G/DL (ref 3.5–5)
ALP SERPL-CCNC: 128 U/L (ref 34–104)
ALT SERPL W P-5'-P-CCNC: 22 U/L (ref 7–52)
ANION GAP SERPL CALCULATED.3IONS-SCNC: 11 MMOL/L (ref 4–13)
APTT PPP: 33 SECONDS (ref 23–37)
AST SERPL W P-5'-P-CCNC: 27 U/L (ref 13–39)
BASOPHILS # BLD AUTO: 0.1 THOUSANDS/ÂΜL (ref 0–0.1)
BASOPHILS NFR BLD AUTO: 1 % (ref 0–1)
BILIRUB SERPL-MCNC: 0.72 MG/DL (ref 0.2–1)
BUN SERPL-MCNC: 7 MG/DL (ref 5–25)
CALCIUM SERPL-MCNC: 9.2 MG/DL (ref 8.4–10.2)
CARDIAC TROPONIN I PNL SERPL HS: 4 NG/L
CARDIAC TROPONIN I PNL SERPL HS: <2 NG/L
CHLORIDE SERPL-SCNC: 95 MMOL/L (ref 96–108)
CO2 SERPL-SCNC: 26 MMOL/L (ref 21–32)
CREAT SERPL-MCNC: 0.65 MG/DL (ref 0.6–1.3)
EOSINOPHIL # BLD AUTO: 0.05 THOUSAND/ÂΜL (ref 0–0.61)
EOSINOPHIL NFR BLD AUTO: 0 % (ref 0–6)
ERYTHROCYTE [DISTWIDTH] IN BLOOD BY AUTOMATED COUNT: 15.7 % (ref 11.6–15.1)
FLUAV RNA RESP QL NAA+PROBE: NEGATIVE
FLUBV RNA RESP QL NAA+PROBE: NEGATIVE
GFR SERPL CREATININE-BSD FRML MDRD: 124 ML/MIN/1.73SQ M
GLUCOSE SERPL-MCNC: 80 MG/DL (ref 65–140)
HCT VFR BLD AUTO: 48.2 % (ref 36.5–49.3)
HGB BLD-MCNC: 15 G/DL (ref 12–17)
IMM GRANULOCYTES # BLD AUTO: 0.12 THOUSAND/UL (ref 0–0.2)
IMM GRANULOCYTES NFR BLD AUTO: 1 % (ref 0–2)
INR PPP: 1.06 (ref 0.84–1.19)
LACTATE SERPL-SCNC: 1.4 MMOL/L (ref 0.5–2)
LIPASE SERPL-CCNC: 20 U/L (ref 11–82)
LYMPHOCYTES # BLD AUTO: 1.69 THOUSANDS/ÂΜL (ref 0.6–4.47)
LYMPHOCYTES NFR BLD AUTO: 14 % (ref 14–44)
MCH RBC QN AUTO: 29.4 PG (ref 26.8–34.3)
MCHC RBC AUTO-ENTMCNC: 31.1 G/DL (ref 31.4–37.4)
MCV RBC AUTO: 94 FL (ref 82–98)
MONOCYTES # BLD AUTO: 0.99 THOUSAND/ÂΜL (ref 0.17–1.22)
MONOCYTES NFR BLD AUTO: 8 % (ref 4–12)
NEUTROPHILS # BLD AUTO: 9.26 THOUSANDS/ÂΜL (ref 1.85–7.62)
NEUTS SEG NFR BLD AUTO: 76 % (ref 43–75)
NRBC BLD AUTO-RTO: 0 /100 WBCS
PLATELET # BLD AUTO: 259 THOUSANDS/UL (ref 149–390)
PMV BLD AUTO: 9 FL (ref 8.9–12.7)
POTASSIUM SERPL-SCNC: 5 MMOL/L (ref 3.5–5.3)
PROCALCITONIN SERPL-MCNC: <0.05 NG/ML
PROT SERPL-MCNC: 7.8 G/DL (ref 6.4–8.4)
PROTHROMBIN TIME: 14.4 SECONDS (ref 11.6–14.5)
RBC # BLD AUTO: 5.11 MILLION/UL (ref 3.88–5.62)
RSV RNA RESP QL NAA+PROBE: NEGATIVE
SARS-COV-2 RNA RESP QL NAA+PROBE: NEGATIVE
SODIUM SERPL-SCNC: 132 MMOL/L (ref 135–147)
WBC # BLD AUTO: 12.21 THOUSAND/UL (ref 4.31–10.16)

## 2024-05-30 PROCEDURE — 99204 OFFICE O/P NEW MOD 45 MIN: CPT | Performed by: FAMILY MEDICINE

## 2024-05-30 PROCEDURE — 94760 N-INVAS EAR/PLS OXIMETRY 1: CPT

## 2024-05-30 PROCEDURE — 71045 X-RAY EXAM CHEST 1 VIEW: CPT

## 2024-05-30 PROCEDURE — 80053 COMPREHEN METABOLIC PANEL: CPT

## 2024-05-30 PROCEDURE — 83605 ASSAY OF LACTIC ACID: CPT

## 2024-05-30 PROCEDURE — 99285 EMERGENCY DEPT VISIT HI MDM: CPT

## 2024-05-30 PROCEDURE — 83690 ASSAY OF LIPASE: CPT

## 2024-05-30 PROCEDURE — 74177 CT ABD & PELVIS W/CONTRAST: CPT

## 2024-05-30 PROCEDURE — 84145 PROCALCITONIN (PCT): CPT

## 2024-05-30 PROCEDURE — 85730 THROMBOPLASTIN TIME PARTIAL: CPT

## 2024-05-30 PROCEDURE — 94640 AIRWAY INHALATION TREATMENT: CPT

## 2024-05-30 PROCEDURE — 71275 CT ANGIOGRAPHY CHEST: CPT

## 2024-05-30 PROCEDURE — 93005 ELECTROCARDIOGRAM TRACING: CPT

## 2024-05-30 PROCEDURE — 36415 COLL VENOUS BLD VENIPUNCTURE: CPT

## 2024-05-30 PROCEDURE — 96365 THER/PROPH/DIAG IV INF INIT: CPT

## 2024-05-30 PROCEDURE — 0241U HB NFCT DS VIR RESP RNA 4 TRGT: CPT

## 2024-05-30 PROCEDURE — 85610 PROTHROMBIN TIME: CPT

## 2024-05-30 PROCEDURE — 5A1945Z RESPIRATORY VENTILATION, 24-96 CONSECUTIVE HOURS: ICD-10-PCS | Performed by: INTERNAL MEDICINE

## 2024-05-30 PROCEDURE — 96375 TX/PRO/DX INJ NEW DRUG ADDON: CPT

## 2024-05-30 PROCEDURE — 99285 EMERGENCY DEPT VISIT HI MDM: CPT | Performed by: EMERGENCY MEDICINE

## 2024-05-30 PROCEDURE — 84484 ASSAY OF TROPONIN QUANT: CPT

## 2024-05-30 PROCEDURE — 87040 BLOOD CULTURE FOR BACTERIA: CPT

## 2024-05-30 PROCEDURE — 85025 COMPLETE CBC W/AUTO DIFF WBC: CPT

## 2024-05-30 RX ORDER — HYDROXYZINE HCL 10 MG/5 ML
SOLUTION, ORAL ORAL
Status: ON HOLD | COMMUNITY
Start: 2024-04-15

## 2024-05-30 RX ORDER — ALBUTEROL SULFATE 2.5 MG/3ML
2.5 SOLUTION RESPIRATORY (INHALATION) EVERY 4 HOURS
Status: DISCONTINUED | OUTPATIENT
Start: 2024-05-30 | End: 2024-05-31

## 2024-05-30 RX ORDER — ALBUTEROL SULFATE 2.5 MG/3ML
2.5 SOLUTION RESPIRATORY (INHALATION)
Status: CANCELLED | OUTPATIENT
Start: 2024-05-31

## 2024-05-30 RX ORDER — HYDROMORPHONE HCL/PF 1 MG/ML
0.5 SYRINGE (ML) INJECTION ONCE
Status: COMPLETED | OUTPATIENT
Start: 2024-05-30 | End: 2024-05-30

## 2024-05-30 RX ORDER — SODIUM CHLORIDE FOR INHALATION 0.9 %
1 VIAL, NEBULIZER (ML) INHALATION
Status: COMPLETED | OUTPATIENT
Start: 2024-05-30 | End: 2024-05-30

## 2024-05-30 RX ORDER — ALBUTEROL SULFATE 2.5 MG/3ML
SOLUTION RESPIRATORY (INHALATION)
Status: ON HOLD | COMMUNITY
Start: 2024-04-15

## 2024-05-30 RX ADMIN — ISODIUM CHLORIDE 1 ML: 0.03 SOLUTION RESPIRATORY (INHALATION) at 23:07

## 2024-05-30 RX ADMIN — APIXABAN 5 MG: 5 TABLET, FILM COATED ORAL at 22:53

## 2024-05-30 RX ADMIN — SODIUM CHLORIDE 1000 ML: 0.9 INJECTION, SOLUTION INTRAVENOUS at 23:01

## 2024-05-30 RX ADMIN — HYDROMORPHONE HYDROCHLORIDE 0.5 MG: 1 INJECTION, SOLUTION INTRAMUSCULAR; INTRAVENOUS; SUBCUTANEOUS at 18:46

## 2024-05-30 RX ADMIN — IOHEXOL 100 ML: 350 INJECTION, SOLUTION INTRAVENOUS at 20:27

## 2024-05-30 RX ADMIN — ALBUTEROL SULFATE 2.5 MG: 2.5 SOLUTION RESPIRATORY (INHALATION) at 23:07

## 2024-05-30 RX ADMIN — PIPERACILLIN AND TAZOBACTAM 4.5 G: 36; 4.5 INJECTION, POWDER, FOR SOLUTION INTRAVENOUS at 20:31

## 2024-05-30 NOTE — Clinical Note
Case was discussed with [facility] and the patient's admission status was agreed to be Admission Status: inpatient status to the service of Dr. Garrett.

## 2024-05-30 NOTE — RESPIRATORY THERAPY NOTE
05/30/24 1854   Respiratory Protocol   Protocol Initiated? Yes   Protocol Selection Respiratory   Language Barrier? No   Medical & Social History Reviewed? Yes   Diagnostic Studies Reviewed? Yes   Physical Assessment Performed? Yes   Home Devices/Therapy Ventilator   Respiratory Plan Vent/NIV/HFNC   Respiratory Assessment   Assessment Type Assess only   General Appearance Other (Comment)  (eyes are swollen shut, but follows commands)   Respiratory Pattern Normal   Chest Assessment Chest expansion symmetrical   Bilateral Breath Sounds Coarse   Suction Trach   Resp Comments See RT note.   Airways   Airway LDA Tracheostomy   Surgical Airway Shiley Cuffed   Placement Date/Time: 12/11/23 0850   Tube Size: 6.5 mm  Placed By: (c) Other (Comment)  Type: Tracheostomy  Brand: [employer]  Style: Cuffed   Status Cuff Inflated   Site Assessment Drainage;Oozing secretions;Red;Swelling   Site Care Dried;Open to air   Inner Cannula Care Changed/new   Ties Assessment Intact;Secure   Equipment at bedside BVM;Wall Suction setup;Additional complete same size trach tube;Additional complete one size smaller trach tube;Obturator;Sterile saline;Additional inner cannula   Additional Assessments   SpO2 97 %

## 2024-05-30 NOTE — ED PROVIDER NOTES
History  Chief Complaint   Patient presents with    Shortness of Breath     Arriving from an outpatient appt. Provider was concerned about possible pneumonia. Pt is diaphoretic and was hypoxic in the office.     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 Pt saw a new PCP today, pt had desatted to 70s, lung exam with coarse breath sounds and decreased breath sounds concerning for possible aspiration pneumonia prompting ED visit.  Patient reports shortness of breath.  Denies chest pain, abdominal pain, urinary symptoms, fever, chills.    MDM: Overall clinical picture concerning for sepsis due to multifocal pneumonia.  Physical exam also with pressure ulcer of pinna of left ear. SIRS criteria: Leukocytosis, tachypnea.  Source of infection: Multifocal pneumonia.  No severe sepsis or septic shock criteria met.  CTA PE study negative for PE.  Patient was started on IV Zosyn, given 1 L normal saline, Dilaudid was given for pain in order to attempt to reduce umbilical hernia at bedside.  Hernia was reduced however does come back out.  No overlying skin changes, no obstruction on CT abdomen.  Case was initially discussed with internal medicine patient was admitted to their service, however given patient is vent dependent on his trach 24/, case was then discussed with ICU, patient admitted to stepdown 1.    Pending blood cultures.            Prior to Admission Medications   Prescriptions Last Dose Informant Patient Reported? Taking?   FLUoxetine (PROzac) 10 mg capsule Not Taking Family Member No No   Si capsule (10 mg total) by Per G Tube route daily   Patient not taking: Reported on 2024   Incontinence Supply Disposable (Comfort Shield Adult Diapers) MISC 2024 Family  Member No Yes   Sig: Use 1 each 4 (four) times a day   Oral Hygiene Products (Toothette Swabs/Dentifrice) SWAB 2024 Family Member No Yes   Sig: Apply to the mouth or throat 3 (three) times a day   Sennosides (senna) 8.8 mg/5 mL oral syrup Not Taking Family Member No No   Sig: 10 mL (17.6 mg total) by Per PEG Tube route daily as needed (Constipation)   Patient not taking: Reported on 2024   albuterol (2.5 mg/3 mL) 0.083 % nebulizer solution 2024 Family Member Yes Yes   Sig: TAKE 3 ML (2.5 MG TOTAL) BY NEBULIZATION 4 (FOUR) TIMES A DAY.   apixaban (ELIQUIS) 5 mg 2024 Family Member No Yes   Si tablet (5 mg total) by Per PEG Tube route 2 (two) times a day   Patient taking differently: 5 mg by Per PEG Tube route 2 (two) times a day Pt taking orally   hydrOXYzine (ATARAX) 10 mg/5 mL syrup Not Taking Family Member Yes No   Si-2 tsp QID prn anxiety PO   Patient not taking: Reported on 2024   oxygen gas 2024 Family Member Yes Yes   Sig: Inhale 6 L/min as needed   polyethylene glycol (MIRALAX) 17 g packet Not Taking Family Member No No   Si g by Per PEG Tube route daily as needed (constipation)   Patient not taking: Reported on 2024   sodium chloride (BRONCHO SALINE) inhaler solution 2024  Yes Yes   Sig: Take 1 spray by nebulization every 8 (eight) hours      Facility-Administered Medications: None       Past Medical History:   Diagnosis Date    Anxiety     Cancer (HCC)     Depression     Migration of percutaneous endoscopic gastrostomy (PEG) tube (HCC) 2023    Psychiatric disorder     bipolar       Past Surgical History:   Procedure Laterality Date    IR BIOPSY LYMPH NODE  2023    IR GASTROSTOMY TUBE PLACEMENT  2023    IR LUMBAR PUNCTURE  2023    IR PORT PLACEMENT  2023    ORCHIECTOMY Left 2022    Procedure: ORCHIECTOMY INGUINAL;  Surgeon: Flip Michael MD;  Location: [facility];  Service: Urology    PEG W/TRACHEOSTOMY PLACEMENT  "N/A 2023    Procedure: TRACHEOSTOMY WITH INSERTION PEG TUBE;  Surgeon: Rudy Mcmanus MD;  Location: [facility];  Service: General    TESTICLE SURGERY         Family History   Problem Relation Age of Onset    Coronary artery disease Maternal Aunt     Cancer Father     Diabetes Father     Hypertension Father      I have reviewed and agree with the history as documented.    E-Cigarette/Vaping    E-Cigarette Use Former User     Start Date 20     Quit Date 23      E-Cigarette/Vaping Substances    Nicotine Yes     THC Yes     CBD No     Flavoring No     Other No     Unknown No      Social History     Tobacco Use    Smoking status: Former     Current packs/day: 0.00     Average packs/day: 0.5 packs/day for 15.4 years (7.7 ttl pk-yrs)     Types: Cigarettes     Start date: 2008     Quit date: 2023     Years since quittin.9    Smokeless tobacco: Never   Vaping Use    Vaping status: Former    Start date: 2018    Quit date: 2023    Substances: Nicotine, THC   Substance Use Topics    Alcohol use: Not Currently     Comment: Former alcoholic. Last use in     Drug use: Not Currently     Types: Marijuana, \"Crack\" cocaine     Comment: Current use medical marijuana. Not currently using crack cocaine.        Review of Systems   Respiratory:  Positive for shortness of breath.         Desaturation to 70% while at outpatient office today   All other systems reviewed and are negative.      Physical Exam  ED Triage Vitals   Temperature Pulse Respirations Blood Pressure SpO2   24 1704 24 1657 24 1711 24 1711 24 1657   99.4 °F (37.4 °C) 74 22 129/86 95 %      Temp Source Heart Rate Source Patient Position - Orthostatic VS BP Location FiO2 (%)   24 1704 24 1704 -- 24 1704 --   Oral Monitor  Right arm       Pain Score       24 1846       5             Orthostatic Vital Signs  Vitals:    24 2130 24 2200 24 2230 24 2300   BP: " 97/54 97/60 123/70 94/53   Pulse: 59 57 (!) 53 (!) 53       Physical Exam  Constitutional:       Appearance: He is ill-appearing.   HENT:      Ears:      Comments: L ear with ulceration and purulent drainage on pinna  Neck:      Comments: Trach site is with some erythema, purulent drainage  Cardiovascular:      Rate and Rhythm: Normal rate and regular rhythm.      Pulses:           Radial pulses are 2+ on the right side and 2+ on the left side.        Dorsalis pedis pulses are 2+ on the right side and 2+ on the left side.        Posterior tibial pulses are 2+ on the right side and 2+ on the left side.      Heart sounds: Normal heart sounds, S1 normal and S2 normal.   Pulmonary:      Effort: Tachypnea present.      Breath sounds: Decreased air movement present. Rhonchi present.      Comments: Trach on 6 L nasal cannula.  Diffusely decreased breath sounds bilaterally with scattered rhonchi, mild tachypnea with respiratory rate of 22/min  Abdominal:      General: Abdomen is protuberant.      Tenderness: There is no abdominal tenderness.      Hernia: A hernia is present. Hernia is present in the umbilical area.      Comments: irreducible umbilical hernia, no overlying skin changes, abdomen is soft and nontender. PEG site is c/d/i   Skin:     General: Skin is warm.      Comments: Diaphoresis of forehead noted   Neurological:      Comments: Nonverbal, will shake head to yes/no questions.         ED Medications  Medications   albuterol inhalation solution 2.5 mg (2.5 mg Nebulization Given 5/30/24 2307)   apixaban (ELIQUIS) tablet 5 mg (5 mg Per PEG Tube Given 5/30/24 2253)   sodium chloride 0.9 % bolus 1,000 mL (1,000 mL Intravenous New Bag 5/30/24 2301)   HYDROmorphone (DILAUDID) injection 0.5 mg (0.5 mg Intravenous Given 5/30/24 1846)   piperacillin-tazobactam (ZOSYN) IVPB 4.5 g (0 g Intravenous Stopped 5/30/24 2129)   iohexol (OMNIPAQUE) 350 MG/ML injection (MULTI-DOSE) 100 mL (100 mL Intravenous Given 5/30/24 2027)    sodium chloride 0.9 % inhalation solution 1 mL (1 mL Nebulization Given 5/30/24 2309)       Diagnostic Studies  Results Reviewed       Procedure Component Value Units Date/Time    Procalcitonin [[id number]]  (Normal) Collected: 05/30/24 2001    Lab Status: Final result Specimen: Blood from Arm, Left Updated: 05/30/24 2039     Procalcitonin <0.05 ng/ml     Blood culture #2 [[id number]] Collected: 05/30/24 2031    Lab Status: In process Specimen: Blood from Arm, Left Updated: 05/30/24 2035    Blood culture #1 [[id number]] Collected: 05/30/24 2031    Lab Status: In process Specimen: Blood from Hand, Left Updated: 05/30/24 2035    HS Troponin I 2hr [[id number]]  (Normal) Collected: 05/30/24 1930    Lab Status: Final result Specimen: Blood from Arm, Left Updated: 05/30/24 2012     hs TnI 2hr <2 ng/L      Delta 2hr hsTnI <-2 ng/L     FLU/RSV/COVID - if FLU/RSV clinically relevant [[id number]]  (Normal) Collected: 05/30/24 1714    Lab Status: Final result Specimen: Nares from Nasopharyngeal Swab Updated: 05/30/24 1810     SARS-CoV-2 Negative     INFLUENZA A PCR Negative     INFLUENZA B PCR Negative     RSV PCR Negative    Narrative:      FOR PEDIATRIC PATIENTS - copy/paste COVID Guidelines URL to browser: [email or web address]    SARS-CoV-2 assay is a Nucleic Acid Amplification assay intended for the  qualitative detection of nucleic acid from SARS-CoV-2 in nasopharyngeal  swabs. Results are for the presumptive identification of SARS-CoV-2 RNA.    Positive results are indicative of infection with SARS-CoV-2, the virus  causing COVID-19, but do not rule out bacterial infection or co-infection  with other viruses. Laboratories within the [place] and its  territories are required to report all positive results to the appropriate  public health authorities. Negative results do not preclude SARS-CoV-2  infection and should not be used as the sole basis for treatment or  other  patient management decisions. Negative results must be combined with  clinical observations, patient history, and epidemiological information.  This test has not been FDA cleared or approved.    This test has been authorized by FDA under an Emergency Use Authorization  (EUA). This test is only authorized for the duration of time the  declaration that circumstances exist justifying the authorization of the  emergency use of an in vitro diagnostic tests for detection of SARS-CoV-2  virus and/or diagnosis of COVID-19 infection under section 564(b)(1) of  the Act, 21 U.S.C. 360bbb-3(b)(1), unless the authorization is terminated  or revoked sooner. The test has been validated but independent review by FDA  and CLIA is pending.    Test performed using [employer] [facility] [facility]: This RT-PCR assay targets N2,  a region unique to SARS-CoV-2. A conserved region in the E-gene was chosen  for pan-Sarbecovirus detection which includes SARS-CoV-2.    According to CMS-2020-01-R, this platform meets the definition of high-throughput technology.    Comprehensive metabolic panel [[id number]]  (Abnormal) Collected: 05/30/24 1714    Lab Status: Final result Specimen: Blood from Arm, Left Updated: 05/30/24 1808     Sodium 132 mmol/L      Potassium 5.0 mmol/L      Chloride 95 mmol/L      CO2 26 mmol/L      ANION GAP 11 mmol/L      BUN 7 mg/dL      Creatinine 0.65 mg/dL      Glucose 80 mg/dL      Calcium 9.2 mg/dL      AST 27 U/L      ALT 22 U/L      Alkaline Phosphatase 128 U/L      Total Protein 7.8 g/dL      Albumin 3.8 g/dL      Total Bilirubin 0.72 mg/dL      eGFR 124 ml/min/1.73sq m     Narrative:      National Kidney Disease Foundation guidelines for Chronic Kidney Disease (CKD):     Stage 1 with normal or high GFR (GFR > 90 mL/min/1.73 square meters)    Stage 2 Mild CKD (GFR = 60-89 mL/min/1.73 square meters)    Stage 3A Moderate CKD (GFR = 45-59 mL/min/1.73 square meters)    Stage 3B Moderate CKD (GFR = 30-44 mL/min/1.73 square  meters)    Stage 4 Severe CKD (GFR = 15-29 mL/min/1.73 square meters)    Stage 5 End Stage CKD (GFR <15 mL/min/1.73 square meters)  Note: GFR calculation is accurate only with a steady state creatinine    Lipase [[id number]]  (Normal) Collected: 05/30/24 1714    Lab Status: Final result Specimen: Blood from Arm, Left Updated: 05/30/24 1808     Lipase 20 u/L     Lactic acid, plasma (w/reflex if result > 2.0) [[id number]]  (Normal) Collected: 05/30/24 1714    Lab Status: Final result Specimen: Blood from Arm, Left Updated: 05/30/24 1808     LACTIC ACID 1.4 mmol/L     Narrative:      Result may be elevated if tourniquet was used during collection.    Protime-INR [[id number]]  (Normal) Collected: 05/30/24 1737    Lab Status: Final result Specimen: Blood from Arm, Left Updated: 05/30/24 1758     Protime 14.4 seconds      INR 1.06    APTT [[id number]]  (Normal) Collected: 05/30/24 1737    Lab Status: Final result Specimen: Blood from Arm, Left Updated: 05/30/24 1758     PTT 33 seconds     HS Troponin 0hr (reflex protocol) [[id number]]  (Normal) Collected: 05/30/24 1714    Lab Status: Final result Specimen: Blood from Arm, Left Updated: 05/30/24 1756     hs TnI 0hr 4 ng/L     CBC and differential [[id number]]  (Abnormal) Collected: 05/30/24 1714    Lab Status: Final result Specimen: Blood from Arm, Left Updated: 05/30/24 1731     WBC 12.21 Thousand/uL      RBC 5.11 Million/uL      Hemoglobin 15.0 g/dL      Hematocrit 48.2 %      MCV 94 fL      MCH 29.4 pg      MCHC 31.1 g/dL      RDW 15.7 %      MPV 9.0 fL      Platelets 259 Thousands/uL      nRBC 0 /100 WBCs      Segmented % 76 %      Immature Grans % 1 %      Lymphocytes % 14 %      Monocytes % 8 %      Eosinophils Relative 0 %      Basophils Relative 1 %      Absolute Neutrophils 9.26 Thousands/µL      Absolute Immature Grans 0.12 Thousand/uL      Absolute Lymphocytes 1.69 Thousands/µL      Absolute Monocytes 0.99 Thousand/µL      Eosinophils Absolute 0.05 Thousand/µL       Basophils Absolute 0.10 Thousands/µL     UA w Reflex to Microscopic w Reflex to Culture [[id number]]     Lab Status: No result Specimen: Urine                    PE Study with CT abdomen & pelvis with contrast   Final Result by Andrea Cook MD (05/30 2102)      No pulmonary embolism.      Dense consolidation in the lower lobes reidentified may represent chronic atelectasis.      New groundglass mixed with alveolar opacities in the remainder of the lung fields most severe in the left upper lobe consistent with multi lobar pneumonia, aspiration not excluded.      Interval development of mild multilevel superior plate compression deformities at T11-L2 with sclerosis suggesting chronic deformities. Correlate for focal back tenderness.            Workstation performed: [id number]         XR chest 1 view portable   ED Interpretation by Nakia Menard MD (05/30 1830)   Poor inspiratory effort, possible LLL opacification concerning for PNA. Will obtain CT chest            Procedures  ECG 12 Lead Documentation Only    Date/Time: 5/30/2024 5:35 PM    Performed by: Nakia Menard MD  Authorized by: Nakia Menard MD    Indications / Diagnosis:  Sob  Patient location:  ED  Previous ECG:     Previous ECG:  Compared to current    Comparison ECG info:  2/10/24    Similarity:  No change  Interpretation:     Interpretation: normal    Rate:     ECG rate:  65    ECG rate assessment: normal    Rhythm:     Rhythm: sinus rhythm    Ectopy:     Ectopy: none    QRS:     QRS axis:  Normal    QRS intervals:  Normal  Conduction:     Conduction: normal    ST segments:     ST segments:  Normal  T waves:     T waves: normal          ED Course  ED Course as of 05/30/24 2323   Thu May 30, 2024   1830 XR chest 1 view portable  Poor inspiratory effort, possible LLL opacification concerning for PNA. Will obtain CT chest   1855 Respiratory suctioned patient's trach; thick purulent secretions.  Respiratory is very familiar with this patient, they  are concerned for possible trach site infection, both family and respiratory therapist believe this is not usually what his trach site looks like.   2112 SLIM texted for admission; resident will reach back out   2149 Pt accepted to inpatient under Dr. Garrett   2242 Was informed pt needs to go to SD1 level of care as pt is vent dependendent 24/7. Case was discussed with ICU AP, pt accepted to SD1 under Dr. Alas             HEART Risk Score      Flowsheet Row Most Recent Value   Heart Score Risk Calculator    History 1 Filed at: 05/30/2024 2150   ECG 0 Filed at: 05/30/2024 2150   Age 0 Filed at: 05/30/2024 2150   Risk Factors 2 Filed at: 05/30/2024 2150   Troponin 0 Filed at: 05/30/2024 2150   HEART Score 3 Filed at: 05/30/2024 2150                     Initial Sepsis Screening       Row Name 05/30/24 2150                Is the patient's history suggestive of a new or worsening infection? Yes (Proceed)  -TR        Suspected source of infection pneumonia  -TR        Indicate SIRS criteria Leukocytosis (WBC > 42375 IJL) OR Leukopenia (WBC <4000 IJL) OR Bandemia (WBC >10% bands);Tachypnea > 20 resp per min  -TR        Are two or more of the above signs & symptoms of infection both present and new to the patient? Yes (Proceed)  -TR        Assess for evidence of organ dysfunction: Are any of the below criteria present within 6 hours of suspected infection and SIRS criteria that are NOT considered to be chronic conditions? --  no severe sepsis criteria  -TR                  User Key  (r) = Recorded By, (t) = Taken By, (c) = Cosigned By      Initials Name Provider Type    TR Nakia Menard MD Physician                    SBIRT 22yo+      Flowsheet Row Most Recent Value   Initial Alcohol Screen: US AUDIT-C     1. How often do you have a drink containing alcohol? 0 Filed at: 05/30/2024 1712   2. How many drinks containing alcohol do you have on a typical day you are drinking?  0 Filed at: 05/30/2024 1712   3a. Male  UNDER 65: How often do you have five or more drinks on one occasion? 0 Filed at: 05/30/2024 1712   3b. FEMALE Any Age, or MALE 65+: How often do you have 4 or more drinks on one occassion? 0 Filed at: 05/30/2024 1712   Audit-C Score 0 Filed at: 05/30/2024 1712   JEAN MARIE: How many times in the past year have you...    Used an illegal drug or used a prescription medication for non-medical reasons? Never Filed at: 05/30/2024 1712            Wells' Criteria for PE      Flowsheet Row Most Recent Value   Wells' Criteria for PE    Clinical signs and symptoms of DVT 0 Filed at: 05/30/2024 1736   PE is primary diagnosis or equally likely 3 Filed at: 05/30/2024 1736   HR >100 0 Filed at: 05/30/2024 1736   Immobilization at least 3 days or Surgery in the previous 4 weeks 1.5 Filed at: 05/30/2024 1736   Previous, objectively diagnosed PE or DVT 1.5 Filed at: 05/30/2024 1736   Hemoptysis 0 Filed at: 05/30/2024 1736   Malignancy with treatment within 6 months or palliative --   Wells' Criteria Total 6 Filed at: 05/30/2024 1736              Medical Decision Making  39-year-old male, trached, vent dependent 24/7, PEG, coming to ED from PCP office at [facility] [place] for desaturation to 70%, concern for multifocal pneumonia.      Differentials including but limited to: PE, pneumonia, PTX, ACS, arrhythmia, anemia, electrolyte abnormality.  Doubt dissection.    Will obtain labs, CTA PE study with abdomen and pelvis, CXR, full septic workup, antibiotics and admit.    Amount and/or Complexity of Data Reviewed  Labs: ordered.  Radiology: ordered and independent interpretation performed. Decision-making details documented in ED Course.    Risk  Prescription drug management.  Decision regarding hospitalization.          Disposition  Final diagnoses:   Sepsis due to pneumonia (HCC)   Irreducible umbilical hernia   Elevated alkaline phosphatase level   Pressure ulcer - left ear   Tracheitis     Time reflects when diagnosis was documented in both MDM as  applicable and the Disposition within this note       Time User Action Codes Description Comment    5/30/2024  9:14 PM Rendano, Nakia Add [J18.9] Multifocal pneumonia     5/30/2024  9:14 PM Rendano, Nakia Add [K42.0] Irreducible umbilical hernia     5/30/2024  9:14 PM Rendano, Nakia Add [R74.8] Elevated alkaline phosphatase level     5/30/2024 10:21 PM [facility] Add [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     5/30/2024 10:21 PM [facility] edward Add [Z74.09] Impaired physical mobility     5/30/2024 10:22 PM [facility] edward Add [J96.11,  J96.12] Chronic respiratory failure with hypoxia and hypercapnia (HCC)     5/30/2024 10:22 PM [facility] edward Add [G70.9] Neuromuscular weakness (HCC)     5/30/2024 10:32 PM Rendano, Nakia Add [J18.9,  A41.9] Sepsis due to pneumonia (HCC)     5/30/2024 10:32 PM Rendano, Nakia Modify [K42.0] Irreducible umbilical hernia     5/30/2024 10:32 PM Rendano, Nakia Remove [J18.9] Multifocal pneumonia     5/30/2024 10:32 PM Rendano, Nakia Modify [R74.8] Elevated alkaline phosphatase level     5/30/2024 10:32 PM Rendano, Nakia Remove [K42.0] Irreducible umbilical hernia     5/30/2024 10:32 PM Rendano, Nakia 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     5/30/2024 10:32 PM Rendano, Nakia Remove [R74.8] Elevated alkaline phosphatase level     5/30/2024 10:32 PM Rendano, Nakia Add [K42.0] Irreducible umbilical hernia     5/30/2024 10:33 PM Rendano, Nakia Add [R74.8] Elevated alkaline phosphatase level     5/30/2024 11:14 PM Rendano, Nakia Add [L89.90] Pressure ulcer     5/30/2024 11:14 PM Rendano, Nakia Modify [L89.90] Pressure ulcer left ear    5/30/2024 11:14 PM Nakia Menard Add [J04.10] Tracheitis           ED Disposition       ED Disposition   Admit    Condition   Stable    Date/Time   Thu May 30, 2024 [id number]    Comment   Case was discussed with ICU AP and the patient's admission status was agreed to be Admission Status: inpatient status to the service of   [facility].               Follow-up Information    None         Patient's Medications   Discharge Prescriptions    No medications on file     No discharge procedures on file.    PDMP Review       None             ED Provider  Attending physically available and evaluated Hemanth Swanson. I managed the patient along with the ED Attending.    Electronically Signed by           Nakia Menard MD  05/30/24 7724

## 2024-05-30 NOTE — RESPIRATORY THERAPY NOTE
Asked by RN to assist with suctioning. Upon assessment patient is trach and vent dependent. Patient has a 6 Shiley cuff inflated trach. Patient on his home ventilator trilogy luci.   Patients outputs reading MAP: 20.5, PIP: 47.3, I:E 1:2.7, leak 44.9, R: 18, VTE: 475. Patients vent is plugged into red outlet and functioning without issues. Patients trach site appears red, edematous, with thick white/yellow purulent secretions coming around trach and suctioned out of the trach.     Nakia RESENDEZ- Resident made aware.

## 2024-05-30 NOTE — PROGRESS NOTES
Assessment/Plan:       Problem List Items Addressed This Visit    None  Visit Diagnoses     Aspiration pneumonia, unspecified aspiration pneumonia type, unspecified laterality, unspecified part of lung (HCC)    -  Primary    Relevant Medications    albuterol (2.5 mg/3 mL) 0.083 % nebulizer solution    Other Relevant Orders    Transfer to other facility (Completed)    Hypoxia        Relevant Orders    Transfer to other facility (Completed)      The patient is here today as a new patient to establish care, accompanied by his aunt, sister and niece.  He lives with all 3 of these family members.  I am concerned today that his lung exam is quite abnormal, and he is intermittently hypoxic into the 70s.  He has a clear history of aspiration and history of aspiration pneumonia.  He follows with pulmonary.  He is vent dependent with trach since August 2023.    Admittedly, this is my first time meeting the patient, however I am quite concerned that he has an aspiration pneumonia presently.  He has been vomiting for the past week with any p.o. intake.  He is diaphoretic in the office and he is having intermittent hypoxia which improves a bit when his family coaxes him to try to cough.  His strength has been progressively worsening over the past few months and his mobility is quite limited.    I have referred him to the emergency department for further evaluation.  His family is agreeable to this plan.    His last CT scan of the chest dated 2/10/2024 noted near complete opacification of the bilateral lower lobes.  He has not had any pulmonary imaging since then.         Subjective:     Hemanth is a 39 y.o. male here today with chief complaint below:  Chief Complaint   Patient presents with   • Establish Care     Previous PCP Dr. Helm. Pt's family was unhappy at previous PCP   • Mood Check     Pt was taken off his meds for bipolar, seems depressed currently. Pt does not see psych.   • Feeding Problem     Pt still has feeding  tube in place, pt needs management for feedings.   • Cough     - CC above per clinical staff and reviewed.    HPI:  Here to establish care.     Concerns for depression.  He was on medication for bipolar disorder/depression in the past- prozac 10 mg, which seemed to help.  He hasn't been on this more recently    He has been sleeping more over the past few months.  Hard to get him out of bed sometimes.  When he was doing therapy, he was doing better with movement.    Used to be able to walk with a walker 3 months or so ago.   He was getting PT at home after his last hospital stay- after that finished he refused to get out of bed and do the exercises and has gotten weak and it takes four people to get him into a wheelchair.    He follows up with neurology.    Hx testicular cancer age 37    Prior to his testicular cancer, aunt notes that he worked in [employer], was jae to take care of his child, was independent.    He sees pulmonary.     Lives w aunt, sister, sister's kids.      Right now he is taking albuterol and blood thinner.   No other meds.     Feeding tube was displaced.  Follows with IR for tube changes.  He eats and drinks by mouth, but some days if he is hypoxic, he goes back on tube feeds and sister gets him set up with the feeds again until he is eating.   He uses Jevity 1.2 @ 70 ml/hr with water flushes every 4 hours of 200 mL water.     Aunt notes everything they feed him, he coughs and vomits, which used to happen more rarely prior to this past week.   No fever.     He is sweaty, but always this way per sister.     Has pulm and neuro follow up but he does not see neurology until July.  They have mention getting EMGs to further evaluate him, but the concern is that he is unable to go to more distant sites due to his oxygen requirements and so scheduling pushed him out until July.    Strength less and less over time.             The following portions of the patient's history were reviewed and updated as  appropriate: allergies, current medications, past family history, past medical history, past social history, past surgical history and problem list.    ROS:  Review of Systems   No fever, chills, congestion, chest pain, shortness of breath, nausea, vomiting, diarrhea, constipation, blood in stool, urinary concerns, mood changes.  Rest of ROS neg except as above.    Objective:      /78   Pulse 74   Resp 20   Wt 131 kg (288 lb) Comment: verbal per pt's family  SpO2 95% Comment: w/ 6L of 02  BMI 35.06 kg/m²   BP Readings from Last 3 Encounters:   05/30/24 114/78   03/18/24 122/60   03/08/24 150/89     Wt Readings from Last 3 Encounters:   05/30/24 131 kg (288 lb)   12/31/23 133 kg (293 lb 3.4 oz)   11/05/23 133 kg (293 lb 10.4 oz)               Physical Exam:   Physical Exam  Vitals and nursing note reviewed.   Constitutional:       Appearance: He is ill-appearing and diaphoretic.      Comments: Nonverbal  Laying on stretcher  Vent/trach   Cardiovascular:      Comments: Limited exam given coarse breath sounds   Pulmonary:      Comments: Very coarse, mucousy breath sounds throughout  Episodes of finger and perioral cyanosis/duskiness with corresponding desats to 70s  Abdominal:      Palpations: Abdomen is soft.      Tenderness: There is no abdominal tenderness.   Musculoskeletal:      Right lower leg: No edema.      Left lower leg: No edema.   Neurological:      Mental Status: He is alert.      Comments: Able to rotate head to side to look at me.  Holding phone.

## 2024-05-31 ENCOUNTER — APPOINTMENT (INPATIENT)
Dept: RADIOLOGY | Facility: HOSPITAL | Age: 39
DRG: 720 | End: 2024-05-31
Payer: COMMERCIAL

## 2024-05-31 PROBLEM — G70.9 NEUROMUSCULAR DISORDER (HCC): Status: RESOLVED | Noted: 2024-05-30 | Resolved: 2024-05-31

## 2024-05-31 LAB
ANION GAP SERPL CALCULATED.3IONS-SCNC: 9 MMOL/L (ref 4–13)
ANION GAP SERPL CALCULATED.3IONS-SCNC: 9 MMOL/L (ref 4–13)
ANISOCYTOSIS BLD QL SMEAR: PRESENT
BASE EX.OXY STD BLDV CALC-SCNC: 79.4 % (ref 60–80)
BASE EXCESS BLDA CALC-SCNC: -4 MMOL/L (ref -2–3)
BASE EXCESS BLDV CALC-SCNC: 3.7 MMOL/L
BASOPHILS # BLD AUTO: 0.08 THOUSANDS/ÂΜL (ref 0–0.1)
BASOPHILS # BLD MANUAL: 0.17 THOUSAND/UL (ref 0–0.1)
BASOPHILS NFR BLD AUTO: 1 % (ref 0–1)
BASOPHILS NFR MAR MANUAL: 1 % (ref 0–1)
BODY TEMPERATURE: 97.8 DEGREES FEHRENHEIT
BUN SERPL-MCNC: 6 MG/DL (ref 5–25)
BUN SERPL-MCNC: 7 MG/DL (ref 5–25)
CA-I BLD-SCNC: 1.18 MMOL/L (ref 1.12–1.32)
CA-I BLD-SCNC: 1.79 MMOL/L (ref 1.12–1.32)
CA-I BLD-SCNC: 1.96 MMOL/L (ref 1.12–1.32)
CALCIUM SERPL-MCNC: 13.8 MG/DL (ref 8.4–10.2)
CALCIUM SERPL-MCNC: 9.2 MG/DL (ref 8.4–10.2)
CHLORIDE SERPL-SCNC: 98 MMOL/L (ref 96–108)
CHLORIDE SERPL-SCNC: 99 MMOL/L (ref 96–108)
CO2 SERPL-SCNC: 29 MMOL/L (ref 21–32)
CO2 SERPL-SCNC: 30 MMOL/L (ref 21–32)
CREAT SERPL-MCNC: 0.72 MG/DL (ref 0.6–1.3)
CREAT SERPL-MCNC: 0.73 MG/DL (ref 0.6–1.3)
EOSINOPHIL # BLD AUTO: 0.04 THOUSAND/ÂΜL (ref 0–0.61)
EOSINOPHIL # BLD MANUAL: 0.34 THOUSAND/UL (ref 0–0.4)
EOSINOPHIL NFR BLD AUTO: 0 % (ref 0–6)
EOSINOPHIL NFR BLD MANUAL: 2 % (ref 0–6)
ERYTHROCYTE [DISTWIDTH] IN BLOOD BY AUTOMATED COUNT: 15.7 % (ref 11.6–15.1)
ERYTHROCYTE [DISTWIDTH] IN BLOOD BY AUTOMATED COUNT: 15.8 % (ref 11.6–15.1)
FIO2 GAS DIL.REBREATH: 100 L
GFR SERPL CREATININE-BSD FRML MDRD: 118 ML/MIN/1.73SQ M
GFR SERPL CREATININE-BSD FRML MDRD: 119 ML/MIN/1.73SQ M
GLUCOSE SERPL-MCNC: 179 MG/DL (ref 65–140)
GLUCOSE SERPL-MCNC: 184 MG/DL (ref 65–140)
GLUCOSE SERPL-MCNC: 95 MG/DL (ref 65–140)
HCO3 BLDA-SCNC: 27.8 MMOL/L (ref 22–28)
HCO3 BLDV-SCNC: 28.1 MMOL/L (ref 24–30)
HCT VFR BLD AUTO: 39.2 % (ref 36.5–49.3)
HCT VFR BLD AUTO: 41.6 % (ref 36.5–49.3)
HCT VFR BLD CALC: 39 % (ref 36.5–49.3)
HGB BLD-MCNC: 12.6 G/DL (ref 12–17)
HGB BLD-MCNC: 13 G/DL (ref 12–17)
HGB BLDA-MCNC: 13.3 G/DL (ref 12–17)
HOROWITZ INDEX BLDA+IHG-RTO: 60 MM[HG]
IMM GRANULOCYTES # BLD AUTO: 0.09 THOUSAND/UL (ref 0–0.2)
IMM GRANULOCYTES NFR BLD AUTO: 1 % (ref 0–2)
INR PPP: 1.34 (ref 0.84–1.19)
LACTATE SERPL-SCNC: 1.3 MMOL/L (ref 0.5–2)
LACTATE SERPL-SCNC: 7.5 MMOL/L (ref 0.5–2)
LYMPHOCYTES # BLD AUTO: 1.36 THOUSANDS/ÂΜL (ref 0.6–4.47)
LYMPHOCYTES # BLD AUTO: 29 % (ref 14–44)
LYMPHOCYTES # BLD AUTO: 4.96 THOUSAND/UL (ref 0.6–4.47)
LYMPHOCYTES NFR BLD AUTO: 15 % (ref 14–44)
MAGNESIUM SERPL-MCNC: 2.2 MG/DL (ref 1.9–2.7)
MAGNESIUM SERPL-MCNC: 2.4 MG/DL (ref 1.9–2.7)
MCH RBC QN AUTO: 29.3 PG (ref 26.8–34.3)
MCH RBC QN AUTO: 29.4 PG (ref 26.8–34.3)
MCHC RBC AUTO-ENTMCNC: 31.3 G/DL (ref 31.4–37.4)
MCHC RBC AUTO-ENTMCNC: 32.1 G/DL (ref 31.4–37.4)
MCV RBC AUTO: 92 FL (ref 82–98)
MCV RBC AUTO: 94 FL (ref 82–98)
METAMYELOCYTE ABSOLUTE CT: 0.17 THOUSAND/UL (ref 0–0.1)
METAMYELOCYTES NFR BLD MANUAL: 1 % (ref 0–1)
MONOCYTES # BLD AUTO: 0.83 THOUSAND/ÂΜL (ref 0.17–1.22)
MONOCYTES # BLD AUTO: 1.54 THOUSAND/UL (ref 0–1.22)
MONOCYTES NFR BLD AUTO: 9 % (ref 4–12)
MONOCYTES NFR BLD: 9 % (ref 4–12)
MYELOCYTE ABSOLUTE CT: 0.68 THOUSAND/UL (ref 0–0.1)
MYELOCYTES NFR BLD MANUAL: 4 % (ref 0–1)
NEUTROPHILS # BLD AUTO: 6.86 THOUSANDS/ÂΜL (ref 1.85–7.62)
NEUTROPHILS # BLD MANUAL: 9.24 THOUSAND/UL (ref 1.85–7.62)
NEUTS BAND NFR BLD MANUAL: 3 % (ref 0–8)
NEUTS SEG NFR BLD AUTO: 51 % (ref 43–75)
NEUTS SEG NFR BLD AUTO: 74 % (ref 43–75)
NRBC BLD AUTO-RTO: 0 /100 WBCS
NRBC BLD AUTO-RTO: 1 /100 WBC (ref 0–2)
O2 CT BLDV-SCNC: 14.3 ML/DL
PCO2 BLD: 30 MMOL/L (ref 21–32)
PCO2 BLD: 86.2 MM HG (ref 36–44)
PCO2 BLDV: 41.5 MM HG (ref 42–50)
PEEP RESPIRATORY: 6 CM[H2O]
PH BLD: 7.12 [PH] (ref 7.35–7.45)
PH BLDV: 7.45 [PH] (ref 7.3–7.4)
PHOSPHATE SERPL-MCNC: 4.7 MG/DL (ref 2.7–4.5)
PHOSPHATE SERPL-MCNC: 6.3 MG/DL (ref 2.7–4.5)
PLATELET # BLD AUTO: 263 THOUSANDS/UL (ref 149–390)
PLATELET # BLD AUTO: 265 THOUSANDS/UL (ref 149–390)
PLATELET # BLD AUTO: 291 THOUSANDS/UL (ref 149–390)
PLATELET BLD QL SMEAR: ADEQUATE
PMV BLD AUTO: 9.3 FL (ref 8.9–12.7)
PMV BLD AUTO: 9.4 FL (ref 8.9–12.7)
PMV BLD AUTO: 9.5 FL (ref 8.9–12.7)
PO2 BLD: 161 MM HG (ref 75–129)
PO2 BLDV: 42.6 MM HG (ref 35–45)
POTASSIUM BLD-SCNC: 3.6 MMOL/L (ref 3.5–5.3)
POTASSIUM SERPL-SCNC: 3.7 MMOL/L (ref 3.5–5.3)
POTASSIUM SERPL-SCNC: 3.8 MMOL/L (ref 3.5–5.3)
PROTHROMBIN TIME: 17.3 SECONDS (ref 11.6–14.5)
RBC # BLD AUTO: 4.28 MILLION/UL (ref 3.88–5.62)
RBC # BLD AUTO: 4.44 MILLION/UL (ref 3.88–5.62)
RBC MORPH BLD: PRESENT
SAO2 % BLD FROM PO2: 99 % (ref 60–85)
SODIUM BLD-SCNC: 136 MMOL/L (ref 136–145)
SODIUM SERPL-SCNC: 137 MMOL/L (ref 135–147)
SODIUM SERPL-SCNC: 137 MMOL/L (ref 135–147)
SPECIMEN SOURCE: ABNORMAL
VENT AC: 18
VENT- AC: AC
VT SETTING VENT: 500 ML
WBC # BLD AUTO: 17.11 THOUSAND/UL (ref 4.31–10.16)
WBC # BLD AUTO: 9.26 THOUSAND/UL (ref 4.31–10.16)

## 2024-05-31 PROCEDURE — 84100 ASSAY OF PHOSPHORUS: CPT

## 2024-05-31 PROCEDURE — 85014 HEMATOCRIT: CPT

## 2024-05-31 PROCEDURE — 85027 COMPLETE CBC AUTOMATED: CPT | Performed by: PHYSICIAN ASSISTANT

## 2024-05-31 PROCEDURE — 85610 PROTHROMBIN TIME: CPT | Performed by: PHYSICIAN ASSISTANT

## 2024-05-31 PROCEDURE — 85025 COMPLETE CBC W/AUTO DIFF WBC: CPT

## 2024-05-31 PROCEDURE — 99291 CRITICAL CARE FIRST HOUR: CPT | Performed by: PHYSICIAN ASSISTANT

## 2024-05-31 PROCEDURE — 84100 ASSAY OF PHOSPHORUS: CPT | Performed by: PHYSICIAN ASSISTANT

## 2024-05-31 PROCEDURE — 94640 AIRWAY INHALATION TREATMENT: CPT

## 2024-05-31 PROCEDURE — 84295 ASSAY OF SERUM SODIUM: CPT

## 2024-05-31 PROCEDURE — 83605 ASSAY OF LACTIC ACID: CPT | Performed by: PHYSICIAN ASSISTANT

## 2024-05-31 PROCEDURE — 94760 N-INVAS EAR/PLS OXIMETRY 1: CPT

## 2024-05-31 PROCEDURE — 84132 ASSAY OF SERUM POTASSIUM: CPT

## 2024-05-31 PROCEDURE — 85007 BL SMEAR W/DIFF WBC COUNT: CPT | Performed by: PHYSICIAN ASSISTANT

## 2024-05-31 PROCEDURE — NC001 PR NO CHARGE: Performed by: STUDENT IN AN ORGANIZED HEALTH CARE EDUCATION/TRAINING PROGRAM

## 2024-05-31 PROCEDURE — 80048 BASIC METABOLIC PNL TOTAL CA: CPT

## 2024-05-31 PROCEDURE — 83735 ASSAY OF MAGNESIUM: CPT

## 2024-05-31 PROCEDURE — 82330 ASSAY OF CALCIUM: CPT | Performed by: PHYSICIAN ASSISTANT

## 2024-05-31 PROCEDURE — 93005 ELECTROCARDIOGRAM TRACING: CPT

## 2024-05-31 PROCEDURE — 94669 MECHANICAL CHEST WALL OSCILL: CPT

## 2024-05-31 PROCEDURE — 5A12012 PERFORMANCE OF CARDIAC OUTPUT, SINGLE, MANUAL: ICD-10-PCS | Performed by: INTERNAL MEDICINE

## 2024-05-31 PROCEDURE — 82330 ASSAY OF CALCIUM: CPT

## 2024-05-31 PROCEDURE — 36415 COLL VENOUS BLD VENIPUNCTURE: CPT

## 2024-05-31 PROCEDURE — 80048 BASIC METABOLIC PNL TOTAL CA: CPT | Performed by: PHYSICIAN ASSISTANT

## 2024-05-31 PROCEDURE — 99223 1ST HOSP IP/OBS HIGH 75: CPT | Performed by: STUDENT IN AN ORGANIZED HEALTH CARE EDUCATION/TRAINING PROGRAM

## 2024-05-31 PROCEDURE — 82803 BLOOD GASES ANY COMBINATION: CPT

## 2024-05-31 PROCEDURE — 82947 ASSAY GLUCOSE BLOOD QUANT: CPT

## 2024-05-31 PROCEDURE — 82805 BLOOD GASES W/O2 SATURATION: CPT | Performed by: PHYSICIAN ASSISTANT

## 2024-05-31 PROCEDURE — 85049 AUTOMATED PLATELET COUNT: CPT

## 2024-05-31 PROCEDURE — 94150 VITAL CAPACITY TEST: CPT

## 2024-05-31 PROCEDURE — 83735 ASSAY OF MAGNESIUM: CPT | Performed by: PHYSICIAN ASSISTANT

## 2024-05-31 PROCEDURE — 71045 X-RAY EXAM CHEST 1 VIEW: CPT

## 2024-05-31 PROCEDURE — 94002 VENT MGMT INPAT INIT DAY: CPT

## 2024-05-31 RX ORDER — SODIUM BICARBONATE 84 MG/ML
INJECTION, SOLUTION INTRAVENOUS CODE/TRAUMA/SEDATION MEDICATION
Status: COMPLETED | OUTPATIENT
Start: 2024-05-31 | End: 2024-05-31

## 2024-05-31 RX ORDER — LEVALBUTEROL INHALATION SOLUTION 1.25 MG/3ML
1.25 SOLUTION RESPIRATORY (INHALATION)
Status: DISCONTINUED | OUTPATIENT
Start: 2024-05-31 | End: 2024-05-31

## 2024-05-31 RX ORDER — LORAZEPAM 2 MG/ML
INJECTION INTRAMUSCULAR
Status: COMPLETED
Start: 2024-05-31 | End: 2024-05-31

## 2024-05-31 RX ORDER — GUAIFENESIN 100 MG/5ML
200 SOLUTION ORAL EVERY 6 HOURS
Status: DISCONTINUED | OUTPATIENT
Start: 2024-05-31 | End: 2024-06-10 | Stop reason: HOSPADM

## 2024-05-31 RX ORDER — ACETAMINOPHEN 10 MG/ML
1000 INJECTION, SOLUTION INTRAVENOUS ONCE
Status: COMPLETED | OUTPATIENT
Start: 2024-05-31 | End: 2024-05-31

## 2024-05-31 RX ORDER — LORAZEPAM 2 MG/ML
INJECTION INTRAMUSCULAR CODE/TRAUMA/SEDATION MEDICATION
Status: COMPLETED | OUTPATIENT
Start: 2024-05-31 | End: 2024-05-31

## 2024-05-31 RX ORDER — CHLORHEXIDINE GLUCONATE ORAL RINSE 1.2 MG/ML
15 SOLUTION DENTAL EVERY 12 HOURS SCHEDULED
Status: DISCONTINUED | OUTPATIENT
Start: 2024-05-31 | End: 2024-06-10 | Stop reason: HOSPADM

## 2024-05-31 RX ORDER — SODIUM CHLORIDE, SODIUM GLUCONATE, SODIUM ACETATE, POTASSIUM CHLORIDE, MAGNESIUM CHLORIDE, SODIUM PHOSPHATE, DIBASIC, AND POTASSIUM PHOSPHATE .53; .5; .37; .037; .03; .012; .00082 G/100ML; G/100ML; G/100ML; G/100ML; G/100ML; G/100ML; G/100ML
500 INJECTION, SOLUTION INTRAVENOUS ONCE
Status: COMPLETED | OUTPATIENT
Start: 2024-05-31 | End: 2024-05-31

## 2024-05-31 RX ORDER — SODIUM CHLORIDE FOR INHALATION 3 %
4 VIAL, NEBULIZER (ML) INHALATION
Status: DISCONTINUED | OUTPATIENT
Start: 2024-05-31 | End: 2024-06-05

## 2024-05-31 RX ORDER — LEVALBUTEROL INHALATION SOLUTION 1.25 MG/3ML
1.25 SOLUTION RESPIRATORY (INHALATION) EVERY 6 HOURS
Status: DISCONTINUED | OUTPATIENT
Start: 2024-05-31 | End: 2024-06-10 | Stop reason: HOSPADM

## 2024-05-31 RX ORDER — CALCIUM CHLORIDE 100 MG/ML
SYRINGE (ML) INTRAVENOUS CODE/TRAUMA/SEDATION MEDICATION
Status: COMPLETED | OUTPATIENT
Start: 2024-05-31 | End: 2024-05-31

## 2024-05-31 RX ORDER — EPINEPHRINE 0.1 MG/ML
INJECTION INTRAVENOUS CODE/TRAUMA/SEDATION MEDICATION
Status: COMPLETED | OUTPATIENT
Start: 2024-05-31 | End: 2024-05-31

## 2024-05-31 RX ADMIN — APIXABAN 5 MG: 5 TABLET, FILM COATED ORAL at 17:38

## 2024-05-31 RX ADMIN — CEFTRIAXONE 2000 MG: 2 INJECTION, POWDER, FOR SOLUTION INTRAMUSCULAR; INTRAVENOUS at 10:20

## 2024-05-31 RX ADMIN — GUAIFENESIN 200 MG: 200 SOLUTION ORAL at 14:30

## 2024-05-31 RX ADMIN — CALCIUM CHLORIDE 1 G: 100 INJECTION PARENTERAL at 20:32

## 2024-05-31 RX ADMIN — APIXABAN 5 MG: 5 TABLET, FILM COATED ORAL at 08:02

## 2024-05-31 RX ADMIN — ACETAMINOPHEN 1000 MG: 1000 INJECTION INTRAVENOUS at 21:54

## 2024-05-31 RX ADMIN — ALBUTEROL SULFATE 2.5 MG: 2.5 SOLUTION RESPIRATORY (INHALATION) at 02:45

## 2024-05-31 RX ADMIN — LEVALBUTEROL HYDROCHLORIDE 1.25 MG: 1.25 SOLUTION RESPIRATORY (INHALATION) at 07:51

## 2024-05-31 RX ADMIN — GUAIFENESIN 200 MG: 200 SOLUTION ORAL at 20:17

## 2024-05-31 RX ADMIN — LORAZEPAM 2 MG: 2 INJECTION INTRAMUSCULAR; INTRAVENOUS at 20:56

## 2024-05-31 RX ADMIN — SODIUM CHLORIDE SOLN NEBU 3% 4 ML: 3 NEBU SOLN at 13:38

## 2024-05-31 RX ADMIN — SODIUM CHLORIDE SOLN NEBU 3% 4 ML: 3 NEBU SOLN at 20:58

## 2024-05-31 RX ADMIN — CALCIUM CHLORIDE 1 G: 100 INJECTION PARENTERAL at 20:34

## 2024-05-31 RX ADMIN — SODIUM BICARBONATE 50 MEQ: 84 INJECTION INTRAVENOUS at 20:33

## 2024-05-31 RX ADMIN — LEVALBUTEROL HYDROCHLORIDE 1.25 MG: 1.25 SOLUTION RESPIRATORY (INHALATION) at 20:57

## 2024-05-31 RX ADMIN — EPINEPHRINE 1 MG: 0.1 INJECTION INTRAVENOUS at 20:33

## 2024-05-31 RX ADMIN — SODIUM CHLORIDE, SODIUM GLUCONATE, SODIUM ACETATE, POTASSIUM CHLORIDE, MAGNESIUM CHLORIDE, SODIUM PHOSPHATE, DIBASIC, AND POTASSIUM PHOSPHATE 500 ML: .53; .5; .37; .037; .03; .012; .00082 INJECTION, SOLUTION INTRAVENOUS at 21:50

## 2024-05-31 RX ADMIN — EPINEPHRINE 1 MG: 0.1 INJECTION INTRAVENOUS at 20:31

## 2024-05-31 RX ADMIN — PIPERACILLIN SODIUM AND TAZOBACTAM SODIUM 4.5 G: 36; 4.5 INJECTION, POWDER, LYOPHILIZED, FOR SOLUTION INTRAVENOUS at 03:06

## 2024-05-31 RX ADMIN — CHLORHEXIDINE GLUCONATE 15 ML: 1.2 RINSE ORAL at 00:33

## 2024-05-31 RX ADMIN — CHLORHEXIDINE GLUCONATE 15 ML: 1.2 RINSE ORAL at 08:02

## 2024-05-31 RX ADMIN — PIPERACILLIN SODIUM AND TAZOBACTAM SODIUM 4.5 G: 36; 4.5 INJECTION, POWDER, LYOPHILIZED, FOR SOLUTION INTRAVENOUS at 07:59

## 2024-05-31 RX ADMIN — CHLORHEXIDINE GLUCONATE 15 ML: 1.2 RINSE ORAL at 20:17

## 2024-05-31 RX ADMIN — IPRATROPIUM BROMIDE 0.5 MG: 0.5 SOLUTION RESPIRATORY (INHALATION) at 07:51

## 2024-05-31 RX ADMIN — GUAIFENESIN 200 MG: 200 SOLUTION ORAL at 10:20

## 2024-05-31 RX ADMIN — LEVALBUTEROL HYDROCHLORIDE 1.25 MG: 1.25 SOLUTION RESPIRATORY (INHALATION) at 13:38

## 2024-05-31 RX ADMIN — LORAZEPAM 2 MG: 2 INJECTION INTRAMUSCULAR; INTRAVENOUS at 20:39

## 2024-05-31 NOTE — H&P
"[facility]  H&P  Name: Hemanth Swanson 39 y.o. male I MRN: [id number]  Unit/Bed#: ED-28 I Date of Admission: 5/30/2024   Date of Service: 5/30/2024 I Hospital Day: 0      Assessment & Plan   Impaired physical mobility  Assessment & Plan  .    Status post tracheostomy (HCC)  Assessment & Plan  .    S/P percutaneous endoscopic gastrostomy (PEG) tube placement (HCC)  Assessment & Plan  .    Sepsis (HCC)  Assessment & Plan  .    * Chronic respiratory failure with hypoxia and hypercapnia (HCC)  Assessment & Plan  .           VTE Pharmacologic Prophylaxis:   {VTE Risk:85482}  Code Status: Prior ***  Discussion with family: {Family Communication:82465}    Anticipated Length of Stay: {Inpt Obs:98625}    Total Time Spent on Date of Encounter in care of patient: *** mins. This time was spent on one or more of the following: performing physical exam; counseling and coordination of care; obtaining or reviewing history; documenting in the medical record; reviewing/ordering tests, medications or procedures; communicating with other healthcare professionals and discussing with patient's family/caregivers.    Chief Complaint: ***    History of Present Illness:  Hemanth Swanson is a 39 y.o. male with a PMH of *** who presents with ***.        2-3 weeks ago: more phlegm than usual (yellow-green color)   Called pulmonology, who put the patient on abx   Completed 7 day course of abx without improvement (amoxicillin)   Went to Dr. Douglas today (new PCP)     Had been keeping food down for the last few days, but was coughing a lot   Regurgitating food whenever he coughed     Hx of anoxic brain injury   No recent fevers or chills   Nonverbal at baseline, but will shake head \"yes or no\"   Has a neuromuscular disease   Sore on his ear, pressure injury?     Lives at home with sister, grandmother, and sister's two daughters     Diet: pureed,   Regular food has to be moist and cut up into small pieces   No: barrios, " anything that might get stuck on the trach     PEG tube in place, but no longer gets the food for it   - kevin 1.5, 22 hours out of the day     No evidence of pain   No recent diarrhea or constipation     Marijuana edibles                 Review of Systems:  Review of Systems    Past Medical and Surgical History:   Past Medical History:   Diagnosis Date    Anxiety     Cancer (HCC)     Depression     Migration of percutaneous endoscopic gastrostomy (PEG) tube (HCC) 09/24/2023    Psychiatric disorder     bipolar       Past Surgical History:   Procedure Laterality Date    IR BIOPSY LYMPH NODE  01/20/2023    IR GASTROSTOMY TUBE PLACEMENT  09/25/2023    IR LUMBAR PUNCTURE  09/06/2023    IR PORT PLACEMENT  03/14/2023    ORCHIECTOMY Left 12/14/2022    Procedure: ORCHIECTOMY INGUINAL;  Surgeon: Flip Michael MD;  Location:  MAIN OR;  Service: Urology    PEG W/TRACHEOSTOMY PLACEMENT N/A 09/08/2023    Procedure: TRACHEOSTOMY WITH INSERTION PEG TUBE;  Surgeon: Rudy Mcmanus MD;  Location: [facility];  Service: General    TESTICLE SURGERY         Meds/Allergies:  Prior to Admission medications    Medication Sig Start Date End Date Taking? Authorizing Provider   acetylcysteine (MUCOMYST) 200 mg/mL nebulizer solution Take 3 mL (600 mg total) by nebulization every 8 (eight) hours Do not start before September 29, 2023.  Patient not taking: Reported on 5/30/2024 9/29/23   AMBER Doe   albuterol (2.5 mg/3 mL) 0.083 % nebulizer solution TAKE 3 ML (2.5 MG TOTAL) BY NEBULIZATION 4 (FOUR) TIMES A DAY. 4/15/24   Historical Provider, MD   apixaban (ELIQUIS) 5 mg 1 tablet (5 mg total) by Per PEG Tube route 2 (two) times a day  Patient taking differently: 5 mg by Per PEG Tube route 2 (two) times a day Pt taking orally 10/18/23   Dora Lee MD   cholecalciferol (VITAMIN D3) 1,000 units tablet 1 tablet (1,000 Units total) by Per G Tube route daily  Patient not taking: Reported on 1/30/2024 10/18/23   Dora Lee MD    FLUoxetine (PROzac) 10 mg capsule 1 capsule (10 mg total) by Per G Tube route daily  Patient not taking: Reported on 1/30/2024 10/18/23   Dora Lee MD   hydrOXYzine (ATARAX) 10 mg/5 mL syrup 1-2 tsp QID prn anxiety PO  Patient not taking: Reported on 5/30/2024 4/15/24   Historical Provider, MD   Incontinence Supply Disposable (Comfort Shield Adult Diapers) MISC Use 1 each 4 (four) times a day 10/18/23   Dora Lee MD   Oral Hygiene Products (Toothette Swabs/Dentifrice) SWAB Apply to the mouth or throat 3 (three) times a day 10/18/23   Dora Lee MD   oxygen gas Inhale 6 L/min as needed    Historical Provider, MD   polyethylene glycol (MIRALAX) 17 g packet 17 g by Per PEG Tube route daily as needed (constipation) 9/23/23   AMBER Leonard   Sennosides (senna) 8.8 mg/5 mL oral syrup 10 mL (17.6 mg total) by Per PEG Tube route daily as needed (Constipation) 9/23/23   AMBER Leonard   thiamine 100 MG tablet 1 tablet (100 mg total) by Per G Tube route daily  Patient not taking: Reported on 10/24/2023 10/18/23   Dora Lee MD   albuterol (ACCUNEB) 1.25 MG/3ML nebulizer solution Take 1.25 mg by nebulization every 6 (six) hours as needed for wheezing  5/30/24  Historical Provider, MD     {Home Meds:26998}    Allergies:   Allergies   Allergen Reactions    Dog Epithelium Cough, Sneezing and Nasal Congestion       Social History:  Marital Status: Single   Occupation: ***  Patient Pre-hospital Living Situation: {Living Situation:36445}  Patient Pre-hospital Level of Mobility: {Mobility:25741}  Patient Pre-hospital Diet Restrictions: ***  Substance Use History:   Social History     Substance and Sexual Activity   Alcohol Use Not Currently    Comment: Former alcoholic. Last use in 2016     Social History     Tobacco Use   Smoking Status Former    Current packs/day: 0.00    Average packs/day: 0.5 packs/day for 15.4 years (7.7 ttl pk-yrs)    Types: Cigarettes    Start date: 1/1/2008    Quit  "date: 2023    Years since quittin.9   Smokeless Tobacco Never     Social History     Substance and Sexual Activity   Drug Use Not Currently    Types: Marijuana, \"Crack\" cocaine    Comment: Current use medical marijuana. Not currently using crack cocaine.       Family History:  {SL IP SLIM Notes FH:23715}    Physical Exam:     Vitals:   Blood Pressure: 97/54 (240)  Pulse: 59 (240)  Temperature: 98.4 °F (36.9 °C) (24 1807)  Temp Source: Oral (24 1704)  Respirations: 22 (24)  SpO2: 91 % (24)    Physical Exam ***    Additional Data:     Lab Results:  Results from last 7 days   Lab Units 24  1714   WBC Thousand/uL 12.21*   HEMOGLOBIN g/dL 15.0   HEMATOCRIT % 48.2   PLATELETS Thousands/uL 259   SEGS PCT % 76*   LYMPHO PCT % 14   MONO PCT % 8   EOS PCT % 0     Results from last 7 days   Lab Units 24  1714   SODIUM mmol/L 132*   POTASSIUM mmol/L 5.0   CHLORIDE mmol/L 95*   CO2 mmol/L 26   BUN mg/dL 7   CREATININE mg/dL 0.65   ANION GAP mmol/L 11   CALCIUM mg/dL 9.2   ALBUMIN g/dL 3.8   TOTAL BILIRUBIN mg/dL 0.72   ALK PHOS U/L 128*   ALT U/L 22   AST U/L 27   GLUCOSE RANDOM mg/dL 80     Results from last 7 days   Lab Units 24  1737   INR  1.06             Results from last 7 days   Lab Units 24  1714   LACTIC ACID mmol/L  --  1.4   PROCALCITONIN ng/ml <0.05  --        Lines/Drains:  Invasive Devices       Central Venous Catheter Line  Duration             Port A Cath Right -- days              Peripheral Intravenous Line  Duration             Peripheral IV 24 Distal;Left;Upper;Ventral (anterior) Arm <1 day    Peripheral IV 24 Left Hand <1 day              Drain  Duration             Gastrostomy/Enterostomy Percutaneous Interventional Gastrostomy 18 Fr.  days              Airway  Duration             Surgical Airway Shiley Cuffed 171 days                    Central Line:  Goal for removal: {Central Line " Removal Goal:45201}           Imaging: {Radiology Review:03677}  PE Study with CT abdomen & pelvis with contrast   Final Result by Andrea Cook MD (2102)      No pulmonary embolism.      Dense consolidation in the lower lobes reidentified may represent chronic atelectasis.      New groundglass mixed with alveolar opacities in the remainder of the lung fields most severe in the left upper lobe consistent with multi lobar pneumonia, aspiration not excluded.      Interval development of mild multilevel superior plate compression deformities at T11-L2 with sclerosis suggesting chronic deformities. Correlate for focal back tenderness.            Workstation performed: [id number]         XR chest 1 view portable   ED Interpretation by Nakia Menard MD ( 183)   Poor inspiratory effort, possible LLL opacification concerning for PNA. Will obtain CT chest          EKG and Other Studies Reviewed on Admission:   EKG: {Interpretation of EK}    ** Please Note: This note has been constructed using a voice recognition system. **

## 2024-05-31 NOTE — UTILIZATION REVIEW
NOTIFICATION OF INPATIENT ADMISSION   AUTHORIZATION REQUEST   SERVICING FACILITY:   [place] [address]  Tax ID: [id number]  NPI: [id number]   ATTENDING PROVIDER:  Attending Name and NPI#: Silvio Alas Md [[id number]]  Address: [address] [place] [address]  Phone: [phone]     ADMISSION INFORMATION:  Place of Service: Inpatient [facility]  Place of Service Code: 21  Inpatient Admission Date/Time: 5/30/24  9:46 PM  Discharge Date/Time: No discharge date for patient encounter.  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     PHYSICIAN ADVISORY SERVICES:  Medical Necessity Denial & Pjpt-zn-Lrzw Review  Phone: [phone]  Fax: [phone]  Email: [email or web address]     DISCHARGE SUPPORT TEAM:  For Patients Discharge Needs & Updates  Phone: [phone] opt. 2 Fax: [phone]  Email: [email or web address]

## 2024-05-31 NOTE — CONSULTS
Recommend home EN regimen of Jevity 1.5 @ 70 ml/hr. Water flushes of 200 ml q 4 hrs. Start @ 20 ml/hr, advance by 10 ml q 4 hrs as tolerated until eventual goal rate is reached.     At goal EN regimen provides 2520 kcals, 107 g protein, and 2556 ml total free water from formula + flushes.

## 2024-05-31 NOTE — ASSESSMENT & PLAN NOTE
-2/2 to multilobar PNA  -Patient was seen at PCP earlier today and noted to have saturations in the 70s and complaint of shortness of breath  -On arrival to the ED he met SIRS criteria due to leukocytosis and tachypnea.  Suspect the source of pneumonia  -CT PE study ordered in the ER, showed no pulmonary embolism did note multilobar pneumonia  -Zosyn Q8 for broad coverage until bcx return

## 2024-05-31 NOTE — QUICK NOTE
Patient was not pulliung tidal volumes, becamse hypoxic and had a bradycardic episode to 27.   Hemodynamically stable.   Lots of oral secretions suctioned from mouth.   Thick clear secretions from inline.   Suctioned trach multiple times. Patient was able to get tidal volumes. Likely had a mucous plug.   Lavaged after restoration of tidal volumes with continued secretions from teach inline.   Added aggressive pulmonary tolieting regimen. Bradycardia resolved when patient was able to get tidal volumes.     AMBER Dodge.

## 2024-05-31 NOTE — ASSESSMENT & PLAN NOTE
-Monitor for s/s of infection  -Currently has increased secretions, likely 2/2 to PNA  -Family also noted increased redness around the trach site  -Denies fevers and purulent drainage

## 2024-05-31 NOTE — UTILIZATION REVIEW
Initial Clinical Review    Admission: Date/Time/Statement:   Admission Orders (From admission, onward)       Ordered        05/30/24 2239  INPATIENT ADMISSION  Once                          Orders Placed This Encounter   Procedures    INPATIENT ADMISSION     Standing Status:   Standing     Number of Occurrences:   1     Order Specific Question:   Level of Care     Answer:   Level 1 Stepdown [13]     Order Specific Question:   Estimated length of stay     Answer:   More than 2 Midnights     Order Specific Question:   Certification     Answer:   I certify that inpatient services are medically necessary for this patient for a duration of greater than two midnights. See H&P and MD Progress Notes for additional information about the patient's course of treatment.     ED Arrival Information       Expected   -    Arrival   5/30/2024 16:19    Acuity   Urgent              Means of arrival   Stretcher    Escorted by   Family Member    Service   Hospitalist    Admission type   Emergency              Arrival complaint   Breathing Issues (on strecther on O2)             Chief Complaint   Patient presents with    Shortness of Breath     Arriving from an outpatient appt. Provider was concerned about possible pneumonia. Pt is diaphoretic and was hypoxic in the office.       Initial Presentation: 39 y.o. male  to ED via  stretcher  from  outpatient appointment .    Admitted to inpatient with Dx: Sepsis due to multilobar pneumonia/ home ventilator dependent/status post trache/peg/Neuromuscular disorder.  Presented to ED with desaturation to 70s at PCP office on day of arrival, concerned of pneumonia and sent to ED.  Complaints of shortness of breath.   2 to 3 weeks decreased appetite.    . PMHx: chronic ventilator dependency due to anoxic brain injury and unknown neuromuscular disorder, PE on Eliquis . On exam: appears ill.  Left ear with ulceration and purulent drainage on pinna.  Trache site with some erythema and purulent  drainage.   Tachypnea.  Decreased air movement.  Trache on home ventilator trilogy luci.    Diffusely decreased breath sounds with scattered rhonchi.   Trache suctioned for thick purulent secretions.   Diaphoresis.  Non verbal, shakes head to questions.  Irreducible umbilical hernia, abdomen soft.  Peg site c/d/I.   Wbc 12.21.   Imaging shows consolidations of lower lobes consistent with atelectasis,  multi lobar pneumonia,  compression deformities of T11-L2.   ED treatment:   trache mask then continued on vent.  Given 1 liter IVF, started on antibiotics, given neb x 2   Plan includes  to continue home vent settings.   PT/OT.  Continue Zosyn.     Anticipated Length of Stay/Certification Statement:  Anticipated Length of Stay is > 2 midnights     Date: 5/31/24    Day 2:  patient with episode of pulseless asystole, chest compressions started, pad placement and port access.   PEA on monitor.  ACLS protocol and given 2 rounds of epi, bicarb and calcium .  Trache suctioned for copious secretions.   ROSC then began with tonic clonic movements and given ativan.   Then returned to baseline.    On exam: cough non productive.  Trache.  Suctioned frothy sputum.   Coarse breath sounds.   Sinus bradycardia.   4/5 all extremities.  GCS 11.  Continue aggressive chest PT, 3% saline nebs, airway clearance protocol. Consider bronch if high volume sections persist. May require exchange of tracheostomy given positional nature, and airleak noted when not in certain alignment. Consider neurology consult for possible EEG if further seizure activity noted. Ativan IV PRN for seizure activity.      Patient has crossed 3 midnights and requires ongoing care    6/1/2024 .  Patient has had 3 episodes of bradycardia into 20s due to increased trach secretions with mucus plugging, worsening depended on trach position, thought to stimulate vagal bradycardia since admission.  Today with  with persistent increased secretions.   MSK chest pain.    On  exam appears ill.  Decreased breath sounds and diffuse Rhonchi.   Trache in place.  Vent 18/500/40/6.    Abnormal labs or imaging:  wbc 12.35.   H&h 11.5/35.5.  K 3.4.     Diagnosis/Plan    Sepsis/Bradycardia/impaired physical mobility/status post trache, peg/neuromuscular disorder/ventilator dependent. Suspect etiology of asystole is hypoxic brooke arrest secondary to increased secretions and position of trache.   Plan:  continue ceftriaxone.  Follow cultures.   Trial lasix with goal -1L.   Follow cultures.  Continue Xopenex, Hypertonic Nebs, Mucinex.    Bronch, will have Trach 6 XL at bedside.  Hold tube feeds.   Continue home vent settings: 18/500/40%/6     ED Triage Vitals   Temperature Pulse Respirations Blood Pressure SpO2   05/30/24 1704 05/30/24 1657 05/30/24 1711 05/30/24 1711 05/30/24 1657   99.4 °F (37.4 °C) 74 22 129/86 95 %      Temp Source Heart Rate Source Patient Position - Orthostatic VS BP Location FiO2 (%)   05/30/24 1704 05/30/24 1704 05/31/24 0330 05/30/24 1704 --   Oral Monitor Lying Right arm       Pain Score       05/30/24 1846       5          Wt Readings from Last 1 Encounters:   06/01/24 119 kg (262 lb 5.6 oz)     Additional Vital Signs:   06/01/24 0728 98.1 °F (36.7 °C) -- -- 89/57 Abnormal  67 -- -- -- --   06/01/24 0600 -- 81 19 100/65 78 95 % -- -- --   06/01/24 0533 -- 68 20 95/55 69 98 % -- -- --   06/01/24 0500 -- 65 18 92/54 68 99 % -- -- --   06/01/24 0445 -- 66 19 93/56 69 98 % -- -- --   06/01/24 0430 -- 72 20 98/60 74 98 % -- -- --   06/01/24 0415 -- 65 19 102/63 77 98 % -- -- --   06/01/24 0400 -- 62 18 100/65 78 98 % -- -- --   06/01/24 0350 -- 60 18 85/51 Abnormal  63 Abnormal  97 % -- -- --   06/01/24 0340 -- 66 18 88/50 Abnormal  64 Abnormal  97 % -- -- --   06/01/24 0335 -- 68 23 Abnormal  86/50 Abnormal  62 Abnormal  97 % -- -- --   06/01/24 0330 -- 68 20 89/50 Abnormal  64 Abnormal  97 % -- -- --   06/01/24 0322 97.5 °F (36.4 °C) -- -- -- -- -- -- -- --   06/01/24 0201  96.9 °F (36.1 °C) Abnormal  -- -- -- -- --        05/31/24 1516 97.6 °F (36.4 °C) -- -- 92/57 70 -- -- -- --   05/31/24 1300 -- 81 25 Abnormal  91/60 71 94 % -- -- --   05/31/24 1230 -- 98 18 106/69 82 92 % -- -- --   05/31/24 1200 97.1 °F (36.2 °C) Abnormal  110 Abnormal  18 127/75 95 99 % -- Ventilator Lying   05/31/24 1156 -- 109 Abnormal  22 -- -- 100 % -- -- --   05/31/24 1155 -- 76 37 Abnormal  197/119 Abnormal  151 74 % Abnormal  -- -- --   05/31/24 1154 -- 28 Abnormal  35 Abnormal  -- -- 82 % Abnormal  -- -- --   05/31/24 1153 -- 41 Abnormal  18 -- -- 96 % -- -- --   05/31/24 1152 -- 52 Abnormal  18 -- -- 97 %        Date/Time Temp Pulse Resp BP MAP (mmHg) SpO2 O2 Flow Rate (L/min) O2 Device Patient Position - Orthostatic VS   05/31/24 1100 -- -- -- -- -- 96 % -- -- --   05/31/24 0751 -- -- -- -- -- 93 % -- -- --   05/31/24 0600 97.8 °F (36.6 °C) -- 19 108/61 77 95 % -- Ventilator Lying   05/31/24 0330 -- 49 Abnormal  18 110/67 84 98 % -- Ventilator Lying   05/31/24 0300 -- 51 Abnormal  18 103/59 76 97 % -- -- --   05/31/24 0245 -- -- -- -- -- 96 % -- -- --   05/31/24 0200 -- 48 Abnormal  20 98/67 78 96 % -- -- --   05/31/24 0130 -- 49 Abnormal  20 98/61 75 95 % -- Ventilator --   05/31/24 0039 97.6 °F (36.4 °C) -- -- -- -- -- -- -- --   05/31/24 0030 -- 52 Abnormal  18 97/59 74 93 % 6 L/min Ventilator --   05/30/24 2308 -- -- -- -- -- 95 % -- -- --   05/30/24 2300 -- 53 Abnormal  20 94/53 67 96 % -- -- --   05/30/24 2200 -- 57 22 97/60 74 94 % -- -- --   05/30/24 2100 -- 61 22 105/52 74 92 % -- -- --   05/30/24 2030 -- 54 Abnormal  22 167/96 124 94 % -- -- --   05/30/24 1900 -- 76 20 112/65 82 95 % -- -- --   05/30/24 1845 -- 78 20 156/94 120 97 % -- -- --   05/30/24 1807 98.4 °F (36.9 °C) -- -- -- -- -- -- -- --   05/30/24 1800 -- 68 22 115/77 92 91 % -- --      Pertinent Labs/Diagnostic Test Results:   XR chest portable ICU   Final Result by Joan Mcintyre MD (06/01 0517)      Worsening bilateral  consolidation which may be a combination of edema and pneumonia.      Persistent bibasilar atelectasis.            Workstation performed: [id number]         PE Study with CT abdomen & pelvis with contrast   Final Result by Andrea Cook MD (05/30 2102)      No pulmonary embolism.      Dense consolidation in the lower lobes reidentified may represent chronic atelectasis.      New groundglass mixed with alveolar opacities in the remainder of the lung fields most severe in the left upper lobe consistent with multi lobar pneumonia, aspiration not excluded.      Interval development of mild multilevel superior plate compression deformities at T11-L2 with sclerosis suggesting chronic deformities. Correlate for focal back tenderness.            Workstation performed: [id number]         XR chest 1 view portable   ED Interpretation by Nakia Menard MD (05/30 1830)   Poor inspiratory effort, possible LLL opacification concerning for PNA. Will obtain CT chest      Final Result by Estefania Ngo MD (05/31 1412)   Low lung volumes with bilateral lower lobe opacities reflecting atelectasis versus infiltrates, progressed.            Workstation performed: [id number]           Date/Time: 5/30/2024 5:35 PM   Indications / Diagnosis:  Sob   Patient location:  ED   Previous ECG:     Previous ECG:  Compared to current     Comparison ECG info:  2/10/24     Similarity:  No change   Interpretation:     Interpretation: normal    Rate:     ECG rate:  65     ECG rate assessment: normal    Rhythm:     Rhythm: sinus rhythm    Ectopy:     Ectopy: none    QRS:     QRS axis:  Normal     QRS intervals:  Normal   Conduction:     Conduction: normal    ST segments:     ST segments:  Normal   T waves:     T waves: normal       Results from last 7 days   Lab Units 05/30/24  1714   SARS-COV-2  Negative     Results from last 7 days   Lab Units 06/01/24  0532 05/31/24  2050 05/31/24  2047 05/31/24  0533 05/31/24  0036 05/30/24  1714   WBC  Thousand/uL 12.35*  --  17.11* 9.26  --  12.21*   HEMOGLOBIN g/dL 11.5*  --  13.0 12.6  --  15.0   I STAT HEMOGLOBIN g/dl  --  13.3  --   --   --   --    HEMATOCRIT % 35.5*  --  41.6 39.2  --  48.2   HEMATOCRIT, ISTAT %  --  39  --   --   --   --    PLATELETS Thousands/uL 239  --  291 263   < > 259   TOTAL NEUT ABS Thousands/µL 10.22*  --   --  6.86  --  9.26*   BANDS PCT %  --   --  3  --   --   --     < > = values in this interval not displayed.     Results from last 7 days   Lab Units 06/01/24 0536 06/01/24 0532 05/31/24 2050 05/31/24 2047 05/31/24 0533 05/30/24  1714   SODIUM mmol/L  --  137  --  137 137 132*   POTASSIUM mmol/L  --  3.4*  --  3.8 3.7 5.0   CHLORIDE mmol/L  --  101  --  99 98 95*   CO2 mmol/L  --  29  --  29 30 26   CO2, I-STAT mmol/L  --   --  30  --   --   --    ANION GAP mmol/L  --  7  --  9 9 11   BUN mg/dL  --  6  --  7 6 7   CREATININE mg/dL  --  0.58*  --  0.72 0.73 0.65   EGFR ml/min/1.73sq m  --  130  --  119 118 124   CALCIUM mg/dL  --  9.2  --  13.8* 9.2 9.2   CALCIUM, IONIZED mmol/L 1.15  --   --  1.79* 1.18  --    CALCIUM, IONIZED, ISTAT mmol/L  --   --  1.96*  --   --   --    MAGNESIUM mg/dL  --  2.2  --  2.4 2.2  --    PHOSPHORUS mg/dL  --  3.7  --  6.3* 4.7*  --      Results from last 7 days   Lab Units 05/30/24  1714   AST U/L 27   ALT U/L 22   ALK PHOS U/L 128*   TOTAL PROTEIN g/dL 7.8   ALBUMIN g/dL 3.8   TOTAL BILIRUBIN mg/dL 0.72     Results from last 7 days   Lab Units 06/01/24  0532 05/31/24  2047 05/31/24  0533 05/30/24  1714   GLUCOSE RANDOM mg/dL 91 179* 95 80     Results from last 7 days   Lab Units 05/30/24  1930 05/30/24  1714   HS TNI 0HR ng/L  --  4   HS TNI 2HR ng/L <2  --    HSTNI D2 ng/L <-2  --      Results from last 7 days   Lab Units 05/31/24  2118 05/30/24  1737   PROTIME seconds 17.3* 14.4   INR  1.34* 1.06   PTT seconds  --  33     Results from last 7 days   Lab Units 06/01/24  0532 05/30/24 2001   PROCALCITONIN ng/ml 0.08 <0.05     Results from last  7 days   Lab Units 05/31/24  2315 05/31/24  2047 05/30/24  1714   LACTIC ACID mmol/L 1.3 7.5* 1.4     Results from last 7 days   Lab Units 05/30/24  1714   LIPASE u/L 20     Results from last 7 days   Lab Units 05/30/24  1714   INFLUENZA A PCR  Negative   INFLUENZA B PCR  Negative   RSV PCR  Negative     Results from last 7 days   Lab Units 05/30/24  2031   BLOOD CULTURE  No Growth at 24 hrs.  No Growth at 24 hrs.       ED Treatment:   Medication Administration from 05/30/2024 1619 to 05/31/2024 0520         Date/Time Order Dose Route Action Comments     05/30/2024 1846 EDT HYDROmorphone (DILAUDID) injection 0.5 mg 0.5 mg Intravenous Given --     05/30/2024 2031 EDT piperacillin-tazobactam (ZOSYN) IVPB 4.5 g 4.5 g Intravenous New Bag --     05/31/2024 0245 EDT albuterol inhalation solution 2.5 mg 2.5 mg Nebulization Given --     05/30/2024 2307 EDT albuterol inhalation solution 2.5 mg 2.5 mg Nebulization Given --     05/30/2024 2253 EDT apixaban (ELIQUIS) tablet 5 mg 5 mg Per PEG Tube Given --     05/30/2024 2307 EDT sodium chloride 0.9 % inhalation solution 1 mL 1 mL Nebulization Given --     05/30/2024 2301 EDT sodium chloride 0.9 % bolus 1,000 mL 1,000 mL Intravenous New Bag --     05/31/2024 0033 EDT chlorhexidine (PERIDEX) 0.12 % oral rinse 15 mL 15 mL Mouth/Throat Given --     05/31/2024 0306 EDT piperacillin-tazobactam (ZOSYN) IVPB 4.5 g 4.5 g Intravenous New Bag --          Past Medical History:   Diagnosis Date    Anxiety     Cancer (HCC)     Depression     Migration of percutaneous endoscopic gastrostomy (PEG) tube ([facility]) 09/24/2023    Psychiatric disorder     bipolar     Present on Admission:   Impaired physical mobility   Sepsis (HCC)   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HCC)      Admitting Diagnosis: Irreducible umbilical hernia [K42.0]  Tracheitis [J04.10]  SOB (shortness of breath) [R06.02]  Elevated alkaline phosphatase level [R74.8]  Neuromuscular weakness (HCC) [G70.9]  Impaired physical mobility  [Z74.09]  Pressure ulcer [L89.90]  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 male  Admission Orders:  Scheduled Medications:  apixaban, 5 mg, Per PEG Tube, BID  cefTRIAXone, 2,000 mg, Intravenous, Q24H  chlorhexidine, 15 mL, Mouth/Throat, Q12H ISAEL  guaiFENesin, 200 mg, Oral, Q6H  levalbuterol, 1.25 mg, Nebulization, Q6H  sodium chloride, 4 mL, Nebulization, Q6H    albuterol inhalation solution 2.5 mg  Dose: 2.5 mg  Freq: Every 4 hours Route: NEBULIZATION  Start: 05/30/24 2230 End: 05/31/24 0540   ipratropium (ATROVENT) 0.02 % inhalation solution 0.5 mg  Dose: 0.5 mg  Freq: 3 times daily (RESP) Route: NEBULIZATION  Start: 05/31/24 0800 End: 05/31/24 0904  piperacillin-tazobactam (ZOSYN) IVPB (EXTENDED INFUSION) 4.5 g  Dose: 4.5 g  Freq: Every 8 hours Route: IV  Start: 05/31/24 0646 End: 05/31/24 0904    acetaminophen (Ofirmev) injection 1,000 mg  Dose: 1,000 mg  Freq: Once Route: IV  Last Dose: Stopped (05/31/24 2215)  Start: 05/31/24 2145 End: 05/31/24 2215    furosemide (LASIX) injection 20 mg  Dose: 20 mg  Freq: Once Route: IV  Start: 06/01/24 1200 End: 06/01/24 1215  multi-electrolyte (ISOLYTE-S PH 7.4) bolus 500 mL  Dose: 500 mL  Freq: Once Route: IV  Last Dose: Stopped (06/01/24 0912)  Start: 06/01/24 0345 End: 06/01/24 0912  multi-electrolyte (ISOLYTE-S PH 7.4) bolus 500 mL  Dose: 500 mL  Freq: Once Route: IV  Last Dose: Stopped (05/31/24 2220)  Start: 05/31/24 2145 End: 05/31/24 2220  potassium chloride 20 mEq IVPB (premix)  Dose: 20 mEq  Freq: Every 2 hours Route: IV  Last Dose: 20 mEq (06/01/24 1140)  Start: 06/01/24 0715 End: 06/01/24 1340       Continuous IV Infusions:     PRN Meds:     Neuro checks every 4 hours  Cardio pulmonary monitoring  NPO  Mechanical ventilation.  On home vent/Trilogy vent    Network Utilization Review Department  ATTENTION:  Please call with any questions or concerns to [phone] and carefully listen to the prompts so that you are directed to the right person. All voicemails are confidential.   For Discharge needs, contact Care Management DC Support Team at [phone] opt. 2  Send all requests for admission clinical reviews, approved or denied determinations and any other requests to dedicated fax number below belonging to the campus where the patient is receiving treatment. List of dedicated fax numbers for the Facilities:  FACILITY NAME UR FAX NUMBER   ADMISSION DENIALS (Administrative/Medical Necessity) [phone]   DISCHARGE SUPPORT TEAM (NETWORK) [phone]   PARENT CHILD HEALTH (Maternity/NICU/Pediatrics) [phone]   [facility] [phone]   [facility] [phone]   [facility] [phone]   [facility] [phone]   [facility] [phone]   [facility] [phone]   [facility] [phone]   [facility] [phone]   [facility] [phone]   [facility] [phone]   [facility] [phone]   [facility] [phone]

## 2024-05-31 NOTE — RESPIRATORY THERAPY NOTE
05/31/24 0751   Respiratory Assessment   Assessment Type Assess only   General Appearance Sleeping   Respiratory Pattern Normal   Chest Assessment Chest expansion symmetrical   Bilateral Breath Sounds Diminished   Cough None   Resp Comments Pt on home trilogy. Nighttime setting are AC/VC passive//RR18/Peep+6/IT0.9/Sen2. Pt doing /RR18/VE8.9/PIP34.9   Vent Information   Vent type   (Home Unit)   $ Pulse Oximetry Spot Check Charge Completed   SpO2 93 %

## 2024-05-31 NOTE — ED NOTES
ER RN called ICU to give report, will call back in 10 minutes once admitting nurse available. Pt awake, stable and denies any complaints at present time. VSS     Lissa Britton RN  05/31/24 5439

## 2024-05-31 NOTE — RESPIRATORY THERAPY NOTE
"RT Protocol Note  Hemanth Swanson 39 y.o. male MRN: [id number]  Unit/Bed#: ICU 12 Encounter: [id number]    Assessment    Active Problems:    Sepsis (HCC)    S/P percutaneous endoscopic gastrostomy (PEG) tube placement (HCC)    Status post tracheostomy (HCC)    Impaired physical mobility    Ventilator dependent (HCC)      Home Pulmonary Medications:  Albuterol  Home Devices/Therapy: (P) Ventilator, Home O2, Other (Comment) (Albuterol)    Past Medical History:   Diagnosis Date    Anxiety     Cancer (HCC)     Depression     Migration of percutaneous endoscopic gastrostomy (PEG) tube (HCC) 2023    Psychiatric disorder     bipolar     Social History     Socioeconomic History    Marital status: Single     Spouse name: None    Number of children: None    Years of education: None    Highest education level: None   Occupational History    None   Tobacco Use    Smoking status: Former     Current packs/day: 0.00     Average packs/day: 0.5 packs/day for 15.4 years (7.7 ttl pk-yrs)     Types: Cigarettes     Start date: 2008     Quit date: 2023     Years since quittin.0    Smokeless tobacco: Never   Vaping Use    Vaping status: Former    Start date: 2018    Quit date: 2023    Substances: Nicotine, THC   Substance and Sexual Activity    Alcohol use: Not Currently     Comment: Former alcoholic. Last use in 2016    Drug use: Not Currently     Types: Marijuana, \"Crack\" cocaine     Comment: Current use medical marijuana. Not currently using crack cocaine.    Sexual activity: Not Currently     Partners: Female     Birth control/protection: Other   Other Topics Concern    None   Social History Narrative    ** Merged History Encounter **          Social Determinants of Health     Financial Resource Strain: Low Risk  (2024)    Overall Financial Resource Strain (CARDIA)     Difficulty of Paying Living Expenses: Not hard at all   Food Insecurity: No Food Insecurity (2024)    Hunger Vital Sign     Worried " "About Running Out of Food in the Last Year: Never true     Ran Out of Food in the Last Year: Never true   Transportation Needs: No Transportation Needs (5/31/2024)    PRAPARE - Transportation     Lack of Transportation (Medical): No     Lack of Transportation (Non-Medical): No   Physical Activity: Not on file   Stress: Not on file   Social Connections: Not on file   Intimate Partner Violence: Not At Risk (11/10/2023)    [facility], [facility]    Humiliation, Afraid, Rape, and Kick questionnaire     Fear of Current or Ex-Partner: No     Emotionally Abused: No     Physically Abused: No     Sexually Abused: No   Housing Stability: Low Risk  (5/31/2024)    Housing Stability Vital Sign     Unable to Pay for Housing in the Last Year: No     Number of Times Moved in the Last Year: 1     Homeless in the Last Year: No       Subjective         Objective    Physical Exam:   Assessment Type: (P) During-treatment  General Appearance: (P) Awake  Respiratory Pattern: (P) Normal  Chest Assessment: (P) Chest expansion symmetrical  Bilateral Breath Sounds: (P) Coarse  Cough: (P) Non-productive  Suction: (P) Trach    Vitals:  Blood pressure 91/60, pulse 81, temperature (!) 97.1 °F (36.2 °C), temperature source Axillary, resp. rate (!) 25, height 6' 4\" (1.93 m), SpO2 94%.          Imaging and other studies: I have personally reviewed pertinent reports.            Plan    Respiratory Plan: (P) Vent/NIV/HFNC  Airway Clearance Plan: (P) Percussive Vest     Resp Comments: PT switched to G5 vent   "

## 2024-05-31 NOTE — ASSESSMENT & PLAN NOTE
-Patient has tracheostomy in place due to anoxic brain injury and neuromuscular disorder.  -Patient is ventilator dependent 24/7  -Due to pneumonia, and patient being on ventilator support for 7, mental state level stop that 1 for further evaluation and treatment.  -Continue home settings

## 2024-05-31 NOTE — H&P
[facility]  H&P  Name: Hemanth Swanson 39 y.o. male I MRN: [id number]  Unit/Bed#: ED-28 I Date of Admission: 5/30/2024   Date of Service: 5/31/2024 I Hospital Day: 1      Assessment & Plan   Ventilator dependent (HCC)  Assessment & Plan  -Patient has tracheostomy in place due to anoxic brain injury and neuromuscular disorder.  -Patient is ventilator dependent 24/7  -Due to pneumonia, and patient being on ventilator support for 7, mental state level stop that 1 for further evaluation and treatment.  -Continue home settings    Impaired physical mobility  Assessment & Plan  -PT/OT for eval  -Case management for placement    Status post tracheostomy (HCC)  Assessment & Plan  -Monitor for s/s of infection  -Currently has increased secretions, likely 2/2 to PNA  -Family also noted increased redness around the trach site  -Denies fevers and purulent drainage    S/P percutaneous endoscopic gastrostomy (PEG) tube placement (HCC)  Assessment & Plan  -Monitor for s/s infection    Sepsis (HCC)  Assessment & Plan  -2/2 to multilobar PNA  -Patient was seen at PCP earlier today and noted to have saturations in the 70s and complaint of shortness of breath  -On arrival to the ED he met SIRS criteria due to leukocytosis and tachypnea.  Suspect the source of pneumonia  -CT PE study ordered in the ER, showed no pulmonary embolism did note multilobar pneumonia  -Zosyn Q8 for broad coverage until bcx return    Neuromuscular disorder (HCC)-resolved as of 5/31/2024  Assessment & Plan  -Undiagnosed neuromuscular disorder per chart  -           History of Present Illness     HPI: Hemanth Swanson is a 39 y.o. with history of chronic ventilator dependency due to anoxic brain injury and on known neuromuscular disorder, presents with decreased oxygen saturation and increased shortness of breath while at his PCP today.  Patient and family did note 2 to 3 weeks of decreased appetite.  Denies fever/chills.  Patient was  noted to have multilobar pneumonia on CT scan in the ER.  Negative for PE.  Due to patient's ventilator dependency, patient admitted to stepdown giordano for further evaluation and treatment.    History obtained from mother and chart review.  Review of Systems: See HPI for Review of Systems  Disposition: Stepdown Level 1  Historical Information   Past Medical History:  No date: Anxiety  No date: Cancer (HCC)  No date: Depression  09/24/2023: Migration of percutaneous endoscopic gastrostomy (PEG)   tube (HCC)  No date: Psychiatric disorder      Comment:  bipolar Past Surgical History:  01/20/2023: IR BIOPSY LYMPH NODE  09/25/2023: IR GASTROSTOMY TUBE PLACEMENT  09/06/2023: IR LUMBAR PUNCTURE  03/14/2023: IR PORT PLACEMENT  12/14/2022: ORCHIECTOMY; Left      Comment:  Procedure: ORCHIECTOMY INGUINAL;  Surgeon: Flip Michael MD;  Location:  MAIN OR;  Service: Urology  09/08/2023: PEG W/TRACHEOSTOMY PLACEMENT; N/A      Comment:  Procedure: TRACHEOSTOMY WITH INSERTION PEG TUBE;                 Surgeon: Rudy Mcmanus MD;  Location: [facility];                 Service: General  No date: TESTICLE SURGERY   Current Outpatient Medications   Medication Instructions    albuterol (2.5 mg/3 mL) 0.083 % nebulizer solution TAKE 3 ML (2.5 MG TOTAL) BY NEBULIZATION 4 (FOUR) TIMES A DAY.    apixaban (ELIQUIS) 5 mg, Per PEG Tube, 2 times daily    FLUoxetine (PROZAC) 10 mg, Per G Tube, Daily    hydrOXYzine (ATARAX) 10 mg/5 mL syrup 1-2 tsp QID prn anxiety PO    Incontinence Supply Disposable (Comfort Shield Adult Diapers) MISC 1 each, Does not apply, 4 times daily    Oral Hygiene Products (Toothette Swabs/Dentifrice) SWAB Mouth/Throat, 3 times daily    oxygen 6 L/min, Inhalation, As needed    polyethylene glycol (MIRALAX) 17 g, Per PEG Tube, Daily PRN    senna 17.6 mg, Per PEG Tube, Daily PRN    sodium chloride (BRONCHO SALINE) inhaler solution 1 spray, Nebulization, Every 8 hours    Allergies   Allergen Reactions  "   Dog Epithelium Cough, Sneezing and Nasal Congestion      Social History     Tobacco Use    Smoking status: Former     Current packs/day: 0.00     Average packs/day: 0.5 packs/day for 15.4 years (7.7 ttl pk-yrs)     Types: Cigarettes     Start date: 2008     Quit date: 2023     Years since quittin.0    Smokeless tobacco: Never   Vaping Use    Vaping status: Former    Start date: 2018    Quit date: 2023    Substances: Nicotine, THC   Substance Use Topics    Alcohol use: Not Currently     Comment: Former alcoholic. Last use in 2016    Drug use: Not Currently     Types: Marijuana, \"Crack\" cocaine     Comment: Current use medical marijuana. Not currently using crack cocaine.    Family History   Problem Relation Age of Onset    Coronary artery disease Maternal Aunt     Cancer Father     Diabetes Father     Hypertension Father           Objective                            Vitals I/O      Most Recent Min/Max in 24hrs   Temp 97.6 °F (36.4 °C) Temp  Min: 97.6 °F (36.4 °C)  Max: 99.4 °F (37.4 °C)   Pulse (!) 48 Pulse  Min: 48  Max: 78   Resp 20 Resp  Min: 18  Max: 22   BP 98/67 BP  Min: 94/53  Max: 167/96   O2 Sat 96 % SpO2  Min: 91 %  Max: 97 %      Intake/Output Summary (Last 24 hours) at 2024 0307  Last data filed at 2024 2358  Gross per 24 hour   Intake 1100 ml   Output --   Net 1100 ml       Diet NPO    Invasive Monitoring           Physical Exam   Physical Exam  Vitals and nursing note reviewed.   HENT:      Head: Normocephalic and atraumatic.      Comments: Tracheostomy in place, moderate amount of erythema surrounding the site.  No purulent drainage noted.     Nose: No congestion.      Mouth/Throat:      Mouth: Mucous membranes are dry.   Cardiovascular:      Rate and Rhythm: Normal rate and regular rhythm.      Pulses: Normal pulses.   Abdominal:      Palpations: Abdomen is soft.      Tenderness: There is no abdominal tenderness.      Comments: PEG tube in place, no signs symptoms of " infection   Constitutional:       General: He is not in acute distress.     Appearance: He is well-developed and well-nourished. He is ill-appearing (chronic).   Pulmonary:      Effort: Pulmonary effort is normal.      Breath sounds: Rhonchi (Minor, diffuse) present.   Neurological:      Mental Status: Mental status is at baseline.            Diagnostic Studies      EKG: Normal sinus rhythm with a rate of 65, unchanged from previous.  Imaging:  I have personally reviewed pertinent reports.   and I have personally reviewed pertinent films in PACS     Medications:  Scheduled PRN   albuterol, 2.5 mg, Q4H  apixaban, 5 mg, BID  chlorhexidine, 15 mL, Q12H ISAEL  piperacillin-tazobactam, 4.5 g, Once   Followed by  piperacillin-tazobactam, 4.5 g, Q8H          Continuous          Labs:    CBC    Recent Labs     05/30/24  1714 05/31/24  0036   WBC 12.21*  --    HGB 15.0  --    HCT 48.2  --     265     BMP    Recent Labs     05/30/24  1714   SODIUM 132*   K 5.0   CL 95*   CO2 26   AGAP 11   BUN 7   CREATININE 0.65   CALCIUM 9.2       Coags    Recent Labs     05/30/24  1737   INR 1.06   PTT 33        Additional Electrolytes  No recent results       Blood Gas    No recent results  No recent results LFTs  Recent Labs     05/30/24  1714   ALT 22   AST 27   ALKPHOS 128*   ALB 3.8   TBILI 0.72       Infectious  Recent Labs     05/30/24 2001   PROCALCITONI <0.05     Glucose  Recent Labs     05/30/24  1714   GLUC 80             Anticipated Length of Stay is > 2 midnights  Keny Ward DO

## 2024-05-31 NOTE — PROGRESS NOTES
Critical Care Attending Note; John Sutherland   Note Date: 24  Note Time: 8:46 AM    Patient: Hemanth Swanson  Age, : 39 y.o., 1985 MRN: [id number] Code Status: Level 1 - Full Code Patient Location: ICU 12/ICU 12   Hospital LOS:1 days  ]   Patient seen and examined, medical record reviewed, discussed with house staff and nursing staff.     HPI   CC: PNA  38M with a PMH of Met L Testicular Seminoma(s/p orchiectomy/chemotherapy), HFpEF, HTN, Obesity, OHS, remote meth/EtOH abuse and depression and progressive neuromuscular disease(Myopathy?) requiring trach/PEG(Vent dependent) now vent dependent who presents with weakness, hypoxia concerning for PNA      Main ICU Plans:       #Neuro  AMS/Neuromuscular Syndrome - Unclear etiology - previously Pulsed - requiring Trach/PEG   - monitor     #Pulm  Acute on Chronic   - Home Triligoy - TV -500 ml  - Wean FiO2 - Maintain Oxygen Sat >92%  - VAP Bundle  - HOB > 30o    - PEEP Titrate      Pulmonary Embolism  - Continue Eliquis     #ID   Pneumonia - Concerns for aspiration -   - CTX  - Pulmonary - Xopenex/Hypertonic saline, Chest PT  - Follow infectious workup     #DVT/GI ppx  Eliquis    #Lines/Tubes/Drains:   Invasive Devices       Central Venous Catheter Line  Duration             Port A Cath Right -- days              Peripheral Intravenous Line  Duration             Peripheral IV 24 Distal;Left;Upper;Ventral (anterior) Arm <1 day    Peripheral IV 24 Left Hand <1 day              Drain  Duration             Gastrostomy/Enterostomy Percutaneous Interventional Gastrostomy 18 Fr.  days              Airway  Duration             Surgical Airway Shiley Cuffed 171 days                    #Nutrition:   Diet NPO        #Code Status:   Level 1 - Full Code    #Dispo:   ICU        John Sutherland MD  Pulmonary, Critical Care    Critical care time, excluding procedures, teaching, family meetings, and excludes any prior time recorded by the AP/resident,  "35 minutes. Upon my evaluation, this patient has a high probability of imminent or life-threatening deterioration due to above problems which required my direct attention, intervention, and personal management.   Impression/Active Problems:    Testicular Seminoma    Obesity   OHS   Wernickes encephalopathy   Myopathy   Acute on  Chronic Respiratory Failure   Increased Secretions   Pneumonia   Pulmonary Embolism    Physical Exam:     Vital Signs:   Weight:    IBW: Ideal body weight: 86.8 kg (191 lb 5.7 oz)  Adjusted ideal body weight: 104 kg (230 lb 0.2 oz)  Temp:  [97.6 °F (36.4 °C)-99.4 °F (37.4 °C)] 97.8 °F (36.6 °C)  HR:  [48-78] 49  Resp:  [18-22] 19  BP: ()/(52-96) 108/61  General: NAD  Neuro: Alert  Heart: RRR  Lungs: Bilateral course  Abdomen: Soft NT  Extremities: Min edema                Ventilator Settings:               Invalid input(s): \"PCO2\", \"O2\"  Radiologic Images Reviewed:    CT Chest  No pulmonary embolism.     Dense consolidation in the lower lobes reidentified may represent chronic atelectasis.     New groundglass mixed with alveolar opacities in the remainder of the lung fields most severe in the left upper lobe consistent with multi lobar pneumonia, aspiration not excluded.     Interval development of mild multilevel superior plate compression deformities at T11-L2 with sclerosis suggesting chronic deformities. Correlate for focal back tenderness.         Workstation performed: [id number]     Input / Output:     Intake/Output Summary (Last 24 hours) at 5/31/2024 0846  Last data filed at 5/31/2024 0500  Gross per 24 hour   Intake 1200 ml   Output --   Net 1200 ml            Infusions:     Scheduled Medications:  Current Facility-Administered Medications   Medication Dose Route Frequency Provider Last Rate    apixaban  5 mg Per PEG Tube BID Merced Washburn MD      chlorhexidine  15 mL Mouth/Throat Q12H [facility] Keny Ward,       ipratropium  0.5 mg Nebulization TID Silvio Judge " "MD Evette      levalbuterol  1.25 mg Nebulization TID Silvio Alas MD      piperacillin-tazobactam  4.5 g Intravenous Q8H Keny Ward, DO         PRN Medications:      Labs Reviewed:  Results from last 7 days   Lab Units 05/31/24  0533 05/31/24  0036 05/30/24  1714   WBC Thousand/uL 9.26  --  12.21*   HEMOGLOBIN g/dL 12.6  --  15.0   HEMATOCRIT % 39.2  --  48.2   PLATELETS Thousands/uL 263 265 259      Results from last 7 days   Lab Units 05/31/24  0533 05/30/24  1714   SODIUM mmol/L 137 132*   CO2 mmol/L 30 26   BUN mg/dL 6 7   CALCIUM mg/dL 9.2 9.2   MAGNESIUM mg/dL 2.2  --    PHOSPHORUS mg/dL 4.7*  --          Invalid input(s): \"ASTSGOT\", \"ALTSGPT\"LABRCNTIP@ ,alkphos:3,tbilirubin:3,dbilirubin:3)@  Results from last 7 days   Lab Units 05/30/24  1737   INR  1.06           Invalid input(s): \"TROPT\", \"PBNP\"             I have personally seen and examined the patient on (05/31/24 between [id number]). I discussed the patient with the AP/resident including, but not limited to, verifying findings; reviewing labs and x-rays; discussing with consultants; developing the plan of care with the bedside nurse; and discussing treatment plan with patient or surrogate.  I have reviewed the note and assessment performed by the AP/resident and agree with the AP/resident’s documented findings and plan of care with the above additions/exceptions. Please see my comments for details and adjustments.                 "

## 2024-05-31 NOTE — SPEECH THERAPY NOTE
Speech Language/Pathology    Speech/Language Pathology Cancel Note    Patient Name: Hemanth Swanson  Today's Date: 5/31/2024     Problem List  Active Problems:    Sepsis (HCC)    S/P percutaneous endoscopic gastrostomy (PEG) tube placement (HCC)    Status post tracheostomy (HCC)    Impaired physical mobility    Ventilator dependent (HCC)      Consult rec'd for swallow eval, pt w/ trach/PEG, vent dependent. Pt admitted w/ hypoxia and increased SOB. CT-chest showed multi lobar pneumonia.    Spoke w/ [facility] attending, Dr. Sutherland, hold swallow eval today due to recent bradycardia, vent switched to hosp vent from home vent; swallow eval can wait until Monday. Discussed possible need for VBS on vent as per home care SLP in jan.  Pt was on a regular/ soft diet w/ thin liquids when seen in Jan 2024.   Will follow up as appropriate.      Tarah Vergara MA CCC-SLP  Speech Pathologist  [place] license # [id number]  [place] license # [id number]  Available via [employer]

## 2024-05-31 NOTE — ED ATTENDING ATTESTATION
5/30/2024  I, Sharon Carr MD, saw and evaluated the patient. I have discussed the patient with the resident/non-physician practitioner and agree with the resident's/non-physician practitioner's findings, Plan of Care, and MDM as documented in the resident's/non-physician practitioner's note, except where noted. All available labs and Radiology studies were reviewed.  I was present for key portions of any procedure(s) performed by the resident/non-physician practitioner and I was immediately available to provide assistance.       At this point I agree with the current assessment done in the Emergency Department.  I have conducted an independent evaluation of this patient a history and physical is as follows:    39-year-old presented to the ER with cough.  Patient with trach and PEG.  Diaphoretic and hypoxic in PCP office.    Agree with workup.  Concern for pneumonia, also has umbilical hernia, agree with scans, infectious workup, IV antibiotics, admission to hospital        ED Course         Critical Care Time  Procedures

## 2024-05-31 NOTE — CASE MANAGEMENT
Case Management Assessment & Discharge Planning Note    Patient name Hemanth Swanson  Location ICU 12/ICU 12 MRN [id number]  : 1985 Date 2024       Current Admission Date: 2024  Current Admission Diagnosis:S/P percutaneous endoscopic gastrostomy (PEG) tube placement (HCC)   Patient Active Problem List    Diagnosis Date Noted Date Diagnosed    Ventilator dependent (HCC) 2024     Copious oral secretions 2024     Bronchiectasis without complication (HCC) 2024     Obesity hypoventilation syndrome (HCC) 10/24/2023     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     Psychophysiological insomnia 10/18/2023     Impaired physical mobility 2023     Multifocal lung consolidation (HCC) 2023     Status post tracheostomy ([facility]) 2023     S/P percutaneous endoscopic gastrostomy (PEG) tube placement (HCC) 2023     Anxiety 2023     Chronic respiratory failure with hypoxia and hypercapnia ([facility]) 2023     Sepsis ([facility]) 2023     Acute pulmonary embolism without acute cor pulmonale ([facility]) 2023     Depression 2023     Neuromuscular weakness ([facility]) 2023     History of LVH (left ventricular hypertrophy) 2023     Pure hypertriglyceridemia 2023     Encephalopathy 2023     Unilateral vestibular schwannoma (HCC) 2023     Port-A-Cath in place 2023     Diplopia 2023     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HCC) 2023     Urinary hesitancy 2022     Umbilical hernia without obstruction and without gangrene 12/10/2022     Tobacco use 12/10/2022     Retroperitoneal lymphadenopathy 12/10/2022       LOS (days): 1  Geometric Mean LOS (GMLOS) (days):   Days to GMLOS:     OBJECTIVE:    Risk of Unplanned Readmission Score: 27.13     Current admission status: Inpatient    Preferred Pharmacy:   [id number] [place] [place] [facility] [address] [address]  Phone: [phone] Fax:  [phone]    Primary Care Provider: Ela Douglas MD    Primary Insurance: [facility]  Secondary Insurance:     ASSESSMENT:  Active Health Care Proxies    There are no active Health Care Proxies on file.       Patient Information  Admitted from:: Home  Mental Status: Other (Comment)  During Assessment patient was accompanied by: Not accompanied during assessment  Assessment information provided by:: Sister (Dmitry)  Primary Caregiver: Family  Caregiver's Name:: Dmitry  Caregiver's Relationship to Patient:: Family Member  Support Systems: Family members  County of Residence: [place]  What city do you live in?: [place]  Home entry access options. Select all that apply.: Ramp  Type of Current Residence: 2 story home  Upon entering residence, is there a bedroom on the main floor (no further steps)?: Yes  Upon entering residence, is there a bathroom on the main floor (no further steps)?: Yes  Living Arrangements: Lives w/ Extended Family (, Dmitry and her two daughter's, Dmitry's mother, Jhon's Aunt, and Dmitry's Grandfather)    Activities of Daily Living Prior to Admission  Functional Status: Assistance (for the past 3 weeks - previously independent)  Completes ADLs independently?: No  Level of ADL dependence: Assistance  Ambulates independently?: No  Level of ambulatory dependence: Assistance (3 person assist for transfer's the past 3-4 weeks, but was Independent prior)  Does patient use assisted devices?: Yes  Assisted Devices (DME) used: Wheelchair, Walker, Hospital Bed, Other (Comment) (Vent)  Does patient currently own DME?: Yes  What DME does the patient currently own?: Walker, Wheelchair, Other (Comment), Hospital Bed (Vent)  Does patient have a history of Outpatient Therapy (PT/OT)?: No  Does the patient have a history of Short-Term Rehab?: Yes  Does patient have a history of HHC?: Yes  Does patient currently have HHC?: No    Patient Information Continued  Income Source: SSI/SSD  Does  patient have prescription coverage?: Yes  Does patient receive dialysis treatments?: No  Does patient have a history of substance abuse?: No  Does patient have a history of Mental Health Diagnosis?: Yes    Means of Transportation  Means of Transport to Appts:: Family transport    Social Determinants of Health (SDOH)      Flowsheet Row Most Recent Value   Housing Stability    In the last 12 months, was there a time when you were not able to pay the mortgage or rent on time? N   At any time in the past 12 months, were you homeless or living in a shelter (including now)? N   Transportation Needs    In the past 12 months, has lack of transportation kept you from medical appointments or from getting medications? no   In the past 12 months, has lack of transportation kept you from meetings, work, or from getting things needed for daily living? No   Food Insecurity    Within the past 12 months, you worried that your food would run out before you got the money to buy more. Never true   Within the past 12 months, the food you bought just didn't last and you didn't have money to get more. Never true   Utilities    In the past 12 months has the electric, gas, oil, or water company threatened to shut off services in your home? No            DISCHARGE DETAILS:    Discharge planning discussed with:: Pt's sisterDmitry  [place] of Choice: Yes  Comments - Freedom of Choice: DC to home with VNA (would like [facility] again)    Contacts  Patient Contacts: Dmitry Swanson (sister)  Relationship to Patient:: Family  Contact Method: Phone  Reason/Outcome: Discharge Planning, Emergency Contact, Continuity of Care    Requested Home Health Care         Is the patient interested in HHC at discharge?: Yes  Home Health Discipline requested:: Nursing, Occupational Therapy, Physical Therapy  Home Health Follow-Up Provider:: PCP  Home Health Services Needed:: Gait/ADL Training, Evaluate Functional Status and Safety, Strengthening/Theraputic Exercises  to Improve Function, Other (comment)  Homebound Criteria Met:: Uses an Assist Device (i.e. cane, walker, etc), Requires the Assistance of Another Person for Safe Ambulation or to Leave the Home  Supporting Clincal Findings:: Limited Endurance    Other Referral/Resources/Interventions Provided:  Interventions: Other (Specify)  Referral Comments: RISHABH spoke with pt's sister, Dmitry, and introduced self/role with dcp. Pt resides with Dmitry (half sister), her two daughters, her mother, her grandfather, and pt's aunt. Dmitry reports that pt is Vent dependent at home - Vent was supplied by [employer] and Charley is their RT. Dmitry reports pt had VNA for a few weeks after pt was d/c to home, but has since been taken care of by the family. Dmitry confirmed pt has 24/7 support. Dmitry reports pt was independent with Ambulation (would walk the VENT with him) until about 3-4 weeks ago. As per Dmitry pt was getting weaker and now a 3 person assist for transfers out of the bed. Family was also transporting pt with the VENT up until recently (had to use insurance to assist with transport). Dmitry reports pt has also not been on tube feeds, but would tanna him to be back on them as he has difficulty with eating. Dmitry wants to make sure pt has adequate nutrition. Dmitry reports that at dc pt will return home. May need transport vs family. Dmitry reports that she would also like VNA again [facility] ept will be dc to home with RN PT/OT. [facility] Dmitry also asking about a bigger hospital bed as pt is too tall for the one he has and a sit to stand. Aware CM will reach out to [facility] to inquire.Dmitry reports pt is on the wait list to see a Neuromuscular specialist.     RISHABH spoke with [facility] liaison. Rickey reports pt would have the option of getting a bed extension for his hospital bed and a nicky lift. As per Rickey they do not provide a sit to stand and since pt has MA plan that he would not be  eligible for a Frank Chair. Rickey will be at [place] later today to assist with the correct order for both.

## 2024-06-01 ENCOUNTER — APPOINTMENT (INPATIENT)
Dept: GASTROENTEROLOGY | Facility: HOSPITAL | Age: 39
DRG: 720 | End: 2024-06-01
Attending: INTERNAL MEDICINE
Payer: COMMERCIAL

## 2024-06-01 PROBLEM — R00.1 BRADYCARDIA: Status: ACTIVE | Noted: 2024-06-01

## 2024-06-01 LAB
ANION GAP SERPL CALCULATED.3IONS-SCNC: 7 MMOL/L (ref 4–13)
BASOPHILS # BLD AUTO: 0.07 THOUSANDS/ÂΜL (ref 0–0.1)
BASOPHILS NFR BLD AUTO: 1 % (ref 0–1)
BILIRUB UR QL STRIP: NEGATIVE
BUN SERPL-MCNC: 6 MG/DL (ref 5–25)
CA-I BLD-SCNC: 1.15 MMOL/L (ref 1.12–1.32)
CALCIUM SERPL-MCNC: 9.2 MG/DL (ref 8.4–10.2)
CHLORIDE SERPL-SCNC: 101 MMOL/L (ref 96–108)
CLARITY UR: NORMAL
CO2 SERPL-SCNC: 29 MMOL/L (ref 21–32)
COLOR UR: YELLOW
CREAT SERPL-MCNC: 0.58 MG/DL (ref 0.6–1.3)
EOSINOPHIL # BLD AUTO: 0.02 THOUSAND/ÂΜL (ref 0–0.61)
EOSINOPHIL NFR BLD AUTO: 0 % (ref 0–6)
ERYTHROCYTE [DISTWIDTH] IN BLOOD BY AUTOMATED COUNT: 15.7 % (ref 11.6–15.1)
GFR SERPL CREATININE-BSD FRML MDRD: 130 ML/MIN/1.73SQ M
GLUCOSE SERPL-MCNC: 91 MG/DL (ref 65–140)
GLUCOSE UR STRIP-MCNC: NEGATIVE MG/DL
HCT VFR BLD AUTO: 35.5 % (ref 36.5–49.3)
HGB BLD-MCNC: 11.5 G/DL (ref 12–17)
HGB UR QL STRIP.AUTO: NEGATIVE
IMM GRANULOCYTES # BLD AUTO: 0.13 THOUSAND/UL (ref 0–0.2)
IMM GRANULOCYTES NFR BLD AUTO: 1 % (ref 0–2)
KETONES UR STRIP-MCNC: NEGATIVE MG/DL
LEUKOCYTE ESTERASE UR QL STRIP: NEGATIVE
LYMPHOCYTES # BLD AUTO: 1.12 THOUSANDS/ÂΜL (ref 0.6–4.47)
LYMPHOCYTES NFR BLD AUTO: 9 % (ref 14–44)
MAGNESIUM SERPL-MCNC: 2.2 MG/DL (ref 1.9–2.7)
MCH RBC QN AUTO: 29.2 PG (ref 26.8–34.3)
MCHC RBC AUTO-ENTMCNC: 32.4 G/DL (ref 31.4–37.4)
MCV RBC AUTO: 90 FL (ref 82–98)
MONOCYTES # BLD AUTO: 0.79 THOUSAND/ÂΜL (ref 0.17–1.22)
MONOCYTES NFR BLD AUTO: 6 % (ref 4–12)
NEUTROPHILS # BLD AUTO: 10.22 THOUSANDS/ÂΜL (ref 1.85–7.62)
NEUTS SEG NFR BLD AUTO: 83 % (ref 43–75)
NITRITE UR QL STRIP: NEGATIVE
NRBC BLD AUTO-RTO: 0 /100 WBCS
PH UR STRIP.AUTO: 8 [PH]
PHOSPHATE SERPL-MCNC: 3.7 MG/DL (ref 2.7–4.5)
PLATELET # BLD AUTO: 239 THOUSANDS/UL (ref 149–390)
PMV BLD AUTO: 9.6 FL (ref 8.9–12.7)
POTASSIUM SERPL-SCNC: 3.4 MMOL/L (ref 3.5–5.3)
PROCALCITONIN SERPL-MCNC: 0.08 NG/ML
PROT UR STRIP-MCNC: NEGATIVE MG/DL
RBC # BLD AUTO: 3.94 MILLION/UL (ref 3.88–5.62)
SODIUM SERPL-SCNC: 137 MMOL/L (ref 135–147)
SP GR UR STRIP.AUTO: 1.01 (ref 1–1.03)
UROBILINOGEN UR STRIP-ACNC: <2 MG/DL
WBC # BLD AUTO: 12.35 THOUSAND/UL (ref 4.31–10.16)

## 2024-06-01 PROCEDURE — 87186 SC STD MICRODIL/AGAR DIL: CPT | Performed by: INTERNAL MEDICINE

## 2024-06-01 PROCEDURE — 84145 PROCALCITONIN (PCT): CPT

## 2024-06-01 PROCEDURE — 85025 COMPLETE CBC W/AUTO DIFF WBC: CPT

## 2024-06-01 PROCEDURE — 84100 ASSAY OF PHOSPHORUS: CPT

## 2024-06-01 PROCEDURE — 87077 CULTURE AEROBIC IDENTIFY: CPT | Performed by: INTERNAL MEDICINE

## 2024-06-01 PROCEDURE — 0B918ZZ DRAINAGE OF TRACHEA, VIA NATURAL OR ARTIFICIAL OPENING ENDOSCOPIC: ICD-10-PCS | Performed by: INTERNAL MEDICINE

## 2024-06-01 PROCEDURE — 94640 AIRWAY INHALATION TREATMENT: CPT

## 2024-06-01 PROCEDURE — 99291 CRITICAL CARE FIRST HOUR: CPT | Performed by: INTERNAL MEDICINE

## 2024-06-01 PROCEDURE — 94669 MECHANICAL CHEST WALL OSCILL: CPT

## 2024-06-01 PROCEDURE — 83735 ASSAY OF MAGNESIUM: CPT

## 2024-06-01 PROCEDURE — 31622 DX BRONCHOSCOPE/WASH: CPT | Performed by: INTERNAL MEDICINE

## 2024-06-01 PROCEDURE — 0B21XFZ CHANGE TRACHEOSTOMY DEVICE IN TRACHEA, EXTERNAL APPROACH: ICD-10-PCS | Performed by: INTERNAL MEDICINE

## 2024-06-01 PROCEDURE — 80048 BASIC METABOLIC PNL TOTAL CA: CPT

## 2024-06-01 PROCEDURE — 94150 VITAL CAPACITY TEST: CPT

## 2024-06-01 PROCEDURE — 94003 VENT MGMT INPAT SUBQ DAY: CPT

## 2024-06-01 PROCEDURE — 87205 SMEAR GRAM STAIN: CPT | Performed by: INTERNAL MEDICINE

## 2024-06-01 PROCEDURE — 87449 NOS EACH ORGANISM AG IA: CPT

## 2024-06-01 PROCEDURE — 82330 ASSAY OF CALCIUM: CPT | Performed by: PHYSICIAN ASSISTANT

## 2024-06-01 PROCEDURE — 87070 CULTURE OTHR SPECIMN AEROBIC: CPT | Performed by: INTERNAL MEDICINE

## 2024-06-01 PROCEDURE — 94760 N-INVAS EAR/PLS OXIMETRY 1: CPT

## 2024-06-01 PROCEDURE — 81003 URINALYSIS AUTO W/O SCOPE: CPT

## 2024-06-01 RX ORDER — POTASSIUM CHLORIDE 14.9 MG/ML
20 INJECTION INTRAVENOUS
Status: COMPLETED | OUTPATIENT
Start: 2024-06-01 | End: 2024-06-01

## 2024-06-01 RX ORDER — SODIUM CHLORIDE, SODIUM GLUCONATE, SODIUM ACETATE, POTASSIUM CHLORIDE, MAGNESIUM CHLORIDE, SODIUM PHOSPHATE, DIBASIC, AND POTASSIUM PHOSPHATE .53; .5; .37; .037; .03; .012; .00082 G/100ML; G/100ML; G/100ML; G/100ML; G/100ML; G/100ML; G/100ML
500 INJECTION, SOLUTION INTRAVENOUS ONCE
Status: COMPLETED | OUTPATIENT
Start: 2024-06-01 | End: 2024-06-01

## 2024-06-01 RX ORDER — LIDOCAINE HYDROCHLORIDE 10 MG/ML
10 INJECTION, SOLUTION EPIDURAL; INFILTRATION; INTRACAUDAL; PERINEURAL ONCE
Status: DISCONTINUED | OUTPATIENT
Start: 2024-06-01 | End: 2024-06-03

## 2024-06-01 RX ORDER — FUROSEMIDE 10 MG/ML
20 INJECTION INTRAMUSCULAR; INTRAVENOUS ONCE
Status: COMPLETED | OUTPATIENT
Start: 2024-06-01 | End: 2024-06-01

## 2024-06-01 RX ADMIN — APIXABAN 5 MG: 5 TABLET, FILM COATED ORAL at 17:47

## 2024-06-01 RX ADMIN — GUAIFENESIN 200 MG: 200 SOLUTION ORAL at 09:11

## 2024-06-01 RX ADMIN — LEVALBUTEROL HYDROCHLORIDE 1.25 MG: 1.25 SOLUTION RESPIRATORY (INHALATION) at 07:33

## 2024-06-01 RX ADMIN — POTASSIUM CHLORIDE 20 MEQ: 14.9 INJECTION, SOLUTION INTRAVENOUS at 13:15

## 2024-06-01 RX ADMIN — GUAIFENESIN 200 MG: 200 SOLUTION ORAL at 21:41

## 2024-06-01 RX ADMIN — SODIUM CHLORIDE SOLN NEBU 3% 4 ML: 3 NEBU SOLN at 20:00

## 2024-06-01 RX ADMIN — SODIUM CHLORIDE SOLN NEBU 3% 4 ML: 3 NEBU SOLN at 07:33

## 2024-06-01 RX ADMIN — POTASSIUM CHLORIDE 20 MEQ: 14.9 INJECTION, SOLUTION INTRAVENOUS at 08:50

## 2024-06-01 RX ADMIN — CHLORHEXIDINE GLUCONATE 15 ML: 1.2 RINSE ORAL at 21:41

## 2024-06-01 RX ADMIN — SODIUM CHLORIDE SOLN NEBU 3% 4 ML: 3 NEBU SOLN at 01:57

## 2024-06-01 RX ADMIN — POTASSIUM CHLORIDE 20 MEQ: 14.9 INJECTION, SOLUTION INTRAVENOUS at 11:40

## 2024-06-01 RX ADMIN — CEFTRIAXONE 2000 MG: 2 INJECTION, POWDER, FOR SOLUTION INTRAMUSCULAR; INTRAVENOUS at 09:12

## 2024-06-01 RX ADMIN — CHLORHEXIDINE GLUCONATE 15 ML: 1.2 RINSE ORAL at 09:11

## 2024-06-01 RX ADMIN — LEVALBUTEROL HYDROCHLORIDE 1.25 MG: 1.25 SOLUTION RESPIRATORY (INHALATION) at 01:57

## 2024-06-01 RX ADMIN — SODIUM CHLORIDE, SODIUM GLUCONATE, SODIUM ACETATE, POTASSIUM CHLORIDE, MAGNESIUM CHLORIDE, SODIUM PHOSPHATE, DIBASIC, AND POTASSIUM PHOSPHATE 500 ML: .53; .5; .37; .037; .03; .012; .00082 INJECTION, SOLUTION INTRAVENOUS at 03:44

## 2024-06-01 RX ADMIN — SODIUM CHLORIDE SOLN NEBU 3% 4 ML: 3 NEBU SOLN at 13:51

## 2024-06-01 RX ADMIN — APIXABAN 5 MG: 5 TABLET, FILM COATED ORAL at 09:11

## 2024-06-01 RX ADMIN — LEVALBUTEROL HYDROCHLORIDE 1.25 MG: 1.25 SOLUTION RESPIRATORY (INHALATION) at 13:51

## 2024-06-01 RX ADMIN — FUROSEMIDE 20 MG: 10 INJECTION, SOLUTION INTRAMUSCULAR; INTRAVENOUS at 12:15

## 2024-06-01 RX ADMIN — SODIUM CHLORIDE, SODIUM GLUCONATE, SODIUM ACETATE, POTASSIUM CHLORIDE, MAGNESIUM CHLORIDE, SODIUM PHOSPHATE, DIBASIC, AND POTASSIUM PHOSPHATE 500 ML: .53; .5; .37; .037; .03; .012; .00082 INJECTION, SOLUTION INTRAVENOUS at 16:12

## 2024-06-01 RX ADMIN — GUAIFENESIN 200 MG: 200 SOLUTION ORAL at 03:01

## 2024-06-01 RX ADMIN — LEVALBUTEROL HYDROCHLORIDE 1.25 MG: 1.25 SOLUTION RESPIRATORY (INHALATION) at 20:00

## 2024-06-01 NOTE — PHYSICAL THERAPY NOTE
PHYSICAL THERAPY NOTE    Patient Name: Hemanth Swanson  Today's Date: 6/1/2024 06/01/24 1630   Note Type   Note type Cancelled Session   Cancel Reasons Medical status   Additional Comments Chart reviewed and spoke to Huong JEONG who reports pt is not medically appropriate for PT @ this time. Pt had episodes of bradycardia and hypoxia earlier today. Will follow. Please note that pt's baseline is +intubation.      Berny Garcia, PT

## 2024-06-01 NOTE — PLAN OF CARE
Problem: Potential for Falls  Goal: Patient will remain free of falls  Description: INTERVENTIONS:  - Educate patient/family on patient safety including physical limitations  - Instruct patient to call for assistance with activity   - Consult OT/PT to assist with strengthening/mobility   - Keep Call bell within reach  - Keep bed low and locked with side rails adjusted as appropriate  - Keep care items and personal belongings within reach  - Initiate and maintain comfort rounds  - Make Fall Risk Sign visible to staff  - Offer Toileting every  Hours, in advance of need  - Initiate/Maintain alarm  - Obtain necessary fall risk management equipment:   Problem: Prexisting or High Potential for Compromised Skin Integrity  Goal: Skin integrity is maintained or improved  Description: INTERVENTIONS:  - Identify patients at risk for skin breakdown  - Assess and monitor skin integrity  - Assess and monitor nutrition and hydration status  - Monitor labs   - Assess for incontinence   - Turn and reposition patient  - Assist with mobility/ambulation  - Relieve pressure over bony prominences  - Avoid friction and shearing  - Provide appropriate hygiene as needed including keeping skin clean and dry  - Evaluate need for skin moisturizer/barrier cream  - Collaborate with interdisciplinary team   - Patient/family teaching  - Consider wound care consult   Outcome: Progressing     Problem: Nutrition/Hydration-ADULT  Goal: Nutrient/Hydration intake appropriate for improving, restoring or maintaining nutritional needs  Description: Monitor and assess patient's nutrition/hydration status for malnutrition. Collaborate with interdisciplinary team and initiate plan and interventions as ordered.  Monitor patient's weight and dietary intake as ordered or per policy. Utilize nutrition screening tool and intervene as necessary. Determine patient's food preferences and provide high-protein, high-caloric foods as appropriate.     INTERVENTIONS:  -  Monitor oral intake, urinary output, labs, and treatment plans  - Assess nutrition and hydration status and recommend course of action  - Evaluate amount of meals eaten  - Assist patient with eating if necessary   - Allow adequate time for meals  - Recommend/ encourage appropriate diets, oral nutritional supplements, and vitamin/mineral supplements  - Order, calculate, and assess calorie counts as needed  - Recommend, monitor, and adjust tube feedings and TPN/PPN based on assessed needs  - Assess need for intravenous fluids  - Provide specific nutrition/hydration education as appropriate  - Include patient/family/caregiver in decisions related to nutrition  Outcome: Progressing     - Apply yellow socks and bracelet for high fall risk patients  - Consider moving patient to room near nurses station  Outcome: Progressing

## 2024-06-01 NOTE — ASSESSMENT & PLAN NOTE
Hx of Testicular CA, S/P 3 cycles CDDP/ ,  then developed neuromuscular syndrome including urinary incontinence and resp failure resulting in vent-dependent, has not established with Neuro  History of anoxic brain injury and unknown neuromuscular disorder  S/p tracheostomy in 9/2023 at [facility], initially 8.0 cuffed,downsized to a Shiley 4 then capping trial.  Several days into the capping trial he developed aspiration pneumonia and ended up being hospitalized at [facility] with a upsized tracheostomy to 8.0, he had a bronchoscopy performed and required full ventilator support at that time.  Trach exchanged to 6.0 cuffed on 3/18/2024  Ventilator dependent 24/7  Continue home vent settings: 18/500/40%/6

## 2024-06-01 NOTE — PROGRESS NOTES
"[facility]  Interval Progress Note: Critical Care  Name: Hemanth Swanson I  MRN: [id number]  Unit/Bed#: ICU 12 I Date of Admission: 5/30/2024   Date of Service: 5/31/2024 I Hospital Day: 1    Interval Events:  Called to the bedside by nursing staff to find patient in pulseless asystole without noted hypoxia. Staff immediately responded with chest compressions, pad placement, and port access. ACLS protocol was followed to include 2 rounds of epinephrine, bicarb, and calcium. PEA on the monitor. Trach was suctioned for copious secretions. ROSC achieved. Immediately following ROSC, patient began to demonstrate tonic clonic type movements prompting 2mg of IV Ativan which aborted movement. Within minutes of ROSC and seizure cessation, patient had returned to his baseline and was following commands and moving all extremities. Etiology likely hypoxic, brooke arrest 2/2 to increased secretions and positional nature of trach.     Assessment/Plan:  Obtain full set of labs and consider head CT if any change in GCS  Continue aggressive chest PT, 3% saline nebs, airway clearance protocol. Consider bronch if high volume sections persist.   May require exchange of tracheostomy given positional nature, and airleak noted when not in certain alignment.   Consider neurology consult for possible EEG if further seizure activity noted. Ativan IV PRN for seizure activity.      Pertinent New Data:   /66   Pulse 65   Temp (!) 96.9 °F (36.1 °C) (Axillary)   Resp 20   Ht 6' 4\" (1.93 m)   SpO2 96%   BMI 35.06 kg/m²     CBC:   Lab Results   Component Value Date    WBC 17.11 (H) 05/31/2024    HGB 13.3 05/31/2024    HCT 39 05/31/2024    MCV 94 05/31/2024     05/31/2024    RBC 4.44 05/31/2024    MCH 29.3 05/31/2024    MCHC 31.3 (L) 05/31/2024    RDW 15.8 (H) 05/31/2024    MPV 9.4 05/31/2024    NRBC 1 05/31/2024   , CMP:   Lab Results   Component Value Date    SODIUM 137 05/31/2024    K 3.8 05/31/2024    " "CL 99 05/31/2024    CO2 30 05/31/2024    CO2 29 05/31/2024    BUN 7 05/31/2024    CREATININE 0.72 05/31/2024    GLUCOSE 184 (H) 05/31/2024    CALCIUM 13.8 (HH) 05/31/2024    EGFR 119 05/31/2024   , Magnesium: No components found for: \"MAG\", Phosphorous:   Lab Results   Component Value Date    PHOS 6.3 (H) 05/31/2024     I have personally reviewed pertinent reports.   and I have personally reviewed pertinent films in PACS    Post code CXR without noted rib fractures or PTX on preliminary read, await official read.    Billing Level:  Critical Care Time Statement: Upon my evaluation, this patient had a high probability of imminent or life-threatening deterioration due to hypoxic, bradycardic arrest, which required my direct attention, intervention, and personal management.  I spent a total of 30 minutes directly providing critical care services, including interpretation of complex medical databases, evaluating for the presence of life-threatening injuries or illnesses, management of organ system failure(s) , complex medical decision making (to support/prevent further life-threatening deterioration)., interpretation of hemodynamic data, ventilator management, and ACLS protocol . This time is exclusive of procedures, teaching, family meetings, and any prior time recorded by providers other than myself.      SIGNATURE: Grazyna Goldstein PA-C  "

## 2024-06-01 NOTE — ASSESSMENT & PLAN NOTE
Concern for increased vagal stimulation resulting hypoxic bradycardia 2/2 to increased secretions and position of trach with mucus plugging  Patient had 3 episodes of bradycardia into 20s due to increased trach secretions with mucus plugging, worsening depended on trach position, thought to stimulate vagal bradycardia. Of note on 5/31, 1x episode resulted in  pulseless asystole without hypoxia, s/p 2 x of epi, bicarb, and calcium, PEA on monitor, ROSC achieved with clearance of secretions.   S/p tracheostomy in 9/2023 at [facility], initially 8.0 cuffed,downsized to a Shiley 4 then capping trial.  Several days into the capping trial he developed aspiration pneumonia and ended up being hospitalized at [facility] with a upsized tracheostomy to 8.0, he had a bronchoscopy performed and required full ventilator support at that time.  Trach exchanged to 6.0 cuffed on 3/18/2024  Home Vent A/ VC withTV 550, RR 24, MV 12-14 L, 100% spontaneous triggers    Plan:  Bronch, will have Trach 6 XL at bedside  Consider ENT for trach exchange  Monitor for s/s of infection, currently has increased secretions, likely 2/2 to PNA

## 2024-06-01 NOTE — QUICK NOTE
Called sister and updated about patient's hospital course. Discussed increased epsidoes of mucus plugging/increased secretions resulting in bradycardic events. Discussed episode of PEA requiring 2x rounds CPR, return of ROSC. Discussed another episode of bradycardia this morning due to increased secretions. Discussed Trach exchanged to distal XLT during bronchoscopy. All questions answered.

## 2024-06-01 NOTE — ASSESSMENT & PLAN NOTE
Hx of Testicular CA, S/P 3 cycles CDDP/ ,  then developed neuromuscular syndrome including urinary incontinence and resp failure resulting in vent-dependent, has not established with Neuro  History of anoxic brain injury and unknown neuromuscular disorder  S/p tracheostomy in 9/2023 at [facility], initially 8.0 cuffed,downsized to a Shiley 4 then capping trial.  Several days into the capping trial he developed aspiration pneumonia and ended up being hospitalized at [facility] with a upsized tracheostomy to 8.0, he had a bronchoscopy performed and required full ventilator support at that time.  Trach exchanged to 6.0 cuffed on 3/18/2024  Ventilator dependent 24/7  Continue home vent settings: 18/500/40%/6

## 2024-06-01 NOTE — RESPIRATORY THERAPY NOTE
06/01/24 1055   Respiratory Assessment   Resp Comments Called to pt's room due to brooke cardia and Desat to 60%. Lavage and suctioned pt for small amount of thin secretions. On vent pt Vt was 110. Trach is postional readjusted the trach and Vt increased to 436 ml. Spoe deejay attending and will bronch later and consider Shiley 6 XLT distal for trach change depending on bronch

## 2024-06-01 NOTE — Clinical Note
Referral Comments: RISHABH spoke with pt's sister, Dmitry, and introduced self/role with dcp. Pt resides with Dmitry (half sister), her two daughters, her mother, her grandfather, and pt's aunt. Dmitry reports that pt is Vent dependent at home - Vent was supplied by [employer] and Charley is their RT. Dmitry reports pt had VNA for a few weeks after pt was d/c to home, but has since been taken care of by the family. Dmitry confirmed pt has 24/7 support. Dmitry reports pt was independent with Ambulation (would walk the VENT with him) until about 3-4 weeks ago. As per Dmitry pt was getting weaker and now a 3 person assist for transfers out of the bed. Family was also transporting pt with the VENT up until recently (had to use insurance to assist with transport). Dmitry reports pt has also not been on tube feeds, but would tanna him to be back on them as he has difficulty with eating. Dmitry wants to make sure pt has adequate nutrition. Dmitry reports that at dc pt will return home. May need transport vs family. Dmitry reports that she would also like VNA again [facility] ept will be dc to home with RN PT/OT. Preference is [facility] - referral in [employer] [employer]. Dmitry also asking about a bigger hospital bed as pt is too tall for the one he has and a sit to stand. Aware CM will reach out to [facility] to inquire.Dmitry reports pt is on the wait list to see a Neuromuscular specialist.      RISHABH spoke with Rickey - [facility] liaison. Rickey reports pt would have the option of getting a bed extension for his hospital bed and a nicky lift. As per Rickey they do not provide a sit to stand and since pt has MA plan that he would not be eligible for a Frank Chair. Rickey will be at [facility] later today to assist with the correct order for both.

## 2024-06-01 NOTE — ASSESSMENT & PLAN NOTE
Hx of Testicular CA, S/P 3 cycles CDDP/ ,  then developed neuromuscular syndrome including urinary incontinence and resp failure resulting in vent-dependent, has not established with Neuro  Neurology outpatient on 7/17

## 2024-06-01 NOTE — PROGRESS NOTES
[facility]  Progress Note  Name: Hemanth Swanson I  MRN: [id number]  Unit/Bed#: ICU 12 I Date of Admission: 5/30/2024   Date of Service: 6/1/2024 I Hospital Day: 2    Assessment & Plan   * Sepsis (HCC)  Assessment & Plan  Presented from PCP with hypoxia with desat in 70s and increased WOB, admitted for, Multilobar PNA on dependent vent   Sepsis Criteria meet: due to leukocytosis and tachypnea.  Suspect the source of pneumonia  CT PE chest: multilobar PNA, most severe in JOSÉ MANUEL, aspiration not excluded, bilateral atelectasis   Started on Zoysn abx and deescalated to Ceftriaxone on 5/31  Blood cultures NGTD     Recent Labs     05/30/24 2001 06/01/24  0532   PROCALCITONI <0.05 0.08       Recent Labs     05/31/24  0533 05/31/24 2047 06/01/24  0532   WBC 9.26 17.11* 12.35*       Continue Ceftriaxone qd, day 2/7  Trial Lasix 20mg, goal -1L  F/u Sputum cultures  F/u Blood cultures  F/u Strep/Legionella Urine   Continue Xopenex, Hypertonic Nebs  Continue Mucinex       Bradycardia  Assessment & Plan  Concern for increased vagal stimulation resulting hypoxic bradycardia 2/2 to increased secretions and position of trach with mucus plugging  Patient had 3 episodes of bradycardia into 20s due to increased trach secretions with mucus plugging, worsening depended on trach position, thought to stimulate vagal bradycardia. Of note on 5/31, 1x episode resulted in  pulseless asystole without hypoxia, s/p 2 x of epi, bicarb, and calcium, PEA on monitor, ROSC achieved with clearance of secretions.   S/p tracheostomy in 9/2023 at [facility], initially 8.0 cuffed,downsized to a Shiley 4 then capping trial.  Several days into the capping trial he developed aspiration pneumonia and ended up being hospitalized at [facility] with a upsized tracheostomy to 8.0, he had a bronchoscopy performed and required full ventilator support at that time.  Trach exchanged to 6.0 cuffed on 3/18/2024  Home Vent A/ VC  withTV 550, RR 24, MV 12-14 L, 100% spontaneous triggers    Plan:  Bronch, will have Trach 6 XL at bedside  Consider ENT for trach exchange  Monitor for s/s of infection, currently has increased secretions, likely 2/2 to PNA    Impaired physical mobility  Assessment & Plan  -PT/OT for eval  -Case management for placement    Status post tracheostomy ([facility])  Assessment & Plan  S/p tracheostomy in 9/2023 at [facility], initially 8.0 cuffed,downsized to a Shiley 4 then capping trial.  Several days into the capping trial he developed aspiration pneumonia and ended up being hospitalized at [facility] with a upsized tracheostomy to 8.0, he had a bronchoscopy performed and required full ventilator support at that time.  Trach exchanged to 6.0 cuffed on 3/18/2024  Home Vent A/ VC withTV 550, RR 24, MV 12-14 L, 100% spontaneous triggers  Concern for increased vagal stimulation resulting hypoxic bradycardia 2/2 to increased secretions and position of trach with mucus plugging    Plan:  Bronch, will have Trach 6 XL at bedside  Consider ENT for trach exchange  Monitor for s/s of infection, currently has increased secretions, likely 2/2 to PNA    S/P percutaneous endoscopic gastrostomy (PEG) tube placement ([facility])  Assessment & Plan  -Monitor for s/s infection  -Holding Tube feeds    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[facility])  Assessment & Plan  Testicular cancer s/p left orchiectomy on 12/2022  Repeat CT scan in January 2023 with enlarging lymph node.  Began Chemo in March 2023: Etoposide and Cisplatin with plan for 4 cycles, 5 days every 21 days  Did not complete chemo treatment due to adverse effects  After 3rd cycle reported double vision, labile affect and right sided weakness and therefore chemotherapy was stopped on 5/26/23  Outpatient follow up with Heme/Onc     Neuromuscular disorder ([facility])  Assessment & Plan  Hx of Testicular CA, S/P 3 cycles CDDP/ ,  then developed neuromuscular syndrome including urinary incontinence and  resp failure resulting in vent-dependent, has not established with Neuro  Neurology outpatient on 7/17    Ventilator dependent (HCC)  Assessment & Plan  Hx of Testicular CA, S/P 3 cycles CDDP/ ,  then developed neuromuscular syndrome including urinary incontinence and resp failure resulting in vent-dependent, has not established with Neuro  History of anoxic brain injury and unknown neuromuscular disorder  S/p tracheostomy in 9/2023 at [facility], initially 8.0 cuffed,downsized to a Shiley 4 then capping trial.  Several days into the capping trial he developed aspiration pneumonia and ended up being hospitalized at [facility] with a upsized tracheostomy to 8.0, he had a bronchoscopy performed and required full ventilator support at that time.  Trach exchanged to 6.0 cuffed on 3/18/2024  Ventilator dependent 24/7  Continue home vent settings: 18/500/40%/6               Disposition: Critical care    ICU Core Measures     Vented Patient  VAP Bundle  VAP bundle ordered     A: Assess, Prevent, and Manage Pain Has pain been assessed? Yes  Need for changes to pain regimen? No   B: Both Spontaneous Awakening Trials (SATs) and Spontaneous Breathing Trials (SBTs) Plan to perform spontaneous awakening trial today? Chronic vent   Plan to perform spontaneous breathing trial today? Chronic vent   Obvious barriers to extubation? Yes   C: Choice of Sedation RASS Goal: 0 Alert and Calm or N/A patient not on sedation  Need for changes to sedation or analgesia regimen? NA   D: Delirium CAM-ICU: Negative   E: Early Mobility  Plan for early mobility? No   F: Family Engagement Plan for family engagement today? Yes       Antibiotic Review: Patient on appropriate coverage based on culture data.  and Awaiting culture results.     Review of Invasive Devices:      Central access plan: Medications requiring central line      Prophylaxis:  VTE VTE covered by:  apixaban, Per PEG Tube, 5 mg at 06/01/24 0911       Stress Ulcer  not  ordered         Significant 24hr Events     24hr events:   Pt found to be in pulseless asystole without hypoxia, received 2 rounds of epinephrine, bicarb, and calcium, PEA on monitor, trach suctioned copious secretions. ROSC achieved but developed tonic clonic movements, received 2mg IV Ativan and seizure activity aborted. Patient returned to baseline. Etiology likely hypoxic brooke arrest 2/2 to increased secretions and position of trach. Patient following commands, repots pain due to compression on chest. Still increased secretions.     Subjective   Review of Systems: Review of Systems   Constitutional:  Negative for chills and fatigue.   Respiratory:  Negative for shortness of breath.         Increased secretions   Cardiovascular:  Negative for leg swelling.        MSK chest pain   Gastrointestinal:  Negative for abdominal pain.        Objective                            Vitals I/O      Most Recent Min/Max in 24hrs   Temp 98.1 °F (36.7 °C) Temp  Min: 96.9 °F (36.1 °C)  Max: 98.1 °F (36.7 °C)   Pulse 81 Pulse  Min: 0  Max: 120   Resp 19 Resp  Min: 18  Max: 28   BP (!) 89/57 BP  Min: 85/51  Max: 136/62   O2 Sat 98 % SpO2  Min: 72 %  Max: 100 %      Intake/Output Summary (Last 24 hours) at 6/1/2024 1207  Last data filed at 6/1/2024 0611  Gross per 24 hour   Intake 1100 ml   Output 2050 ml   Net -950 ml       Diet NPO    Invasive Monitoring   None        Physical Exam   Physical Exam  Vitals and nursing note reviewed.   Skin:     General: Skin is warm and dry.   Cardiovascular:      Rate and Rhythm: Normal rate and regular rhythm.   Musculoskeletal:      Right lower leg: No edema.      Left lower leg: No edema.   Abdominal: General: Bowel sounds are normal.      Palpations: Abdomen is soft.      Tenderness: There is no abdominal tenderness. There is no guarding.   Constitutional:       Appearance: He is ill-appearing.   Pulmonary:      Breath sounds: Decreased breath sounds (diffuse) and rhonchi (diffuse rhonchi)  present.      Comments: Trach inplace, vent: 18/500/40/6           Diagnostic Studies      EKG: Tele reviewed, bradycardic to 30s, asystole around event of cardiac arrest. No other events.  Imagin/31 CXR: Worsening bilateral consolidation which may be a combination of edema and pneumonia. Persistent bibasilar atelectasis.    CXR: Low lung volumes with bilateral lower lobe opacities reflecting atelectasis versus infiltrates, progressed.      PE CT:   Dense consolidation in the lower lobes reidentified may represent chronic atelectasis.New groundglass mixed with alveolar opacities in the remainder of the lung fields most severe in the left upper lobe consistent with multi lobar pneumonia, aspiration not excluded. Interval development of mild multilevel superior plate compression deformities at T11-L2 with sclerosis suggesting chronic deformities. Correlate for focal back tenderness.      I have personally reviewed pertinent reports.       Medications:  Scheduled PRN   apixaban, 5 mg, BID  cefTRIAXone, 2,000 mg, Q24H  chlorhexidine, 15 mL, Q12H ISAEL  furosemide, 20 mg, Once  guaiFENesin, 200 mg, Q6H  levalbuterol, 1.25 mg, Q6H  lidocaine (PF), 10 mL, Once  potassium chloride, 20 mEq, Q2H  sodium chloride, 4 mL, Q6H          Continuous          Labs:    CBC    Recent Labs     24  0532   WBC 17.11*  --  12.35*   HGB 13.0 13.3 11.5*   HCT 41.6 39 35.5*     --  239   BANDSPCT 3  --   --      BMP    Recent Labs     24  0532   SODIUM 137  --  137   K 3.8  --  3.4*   CL 99  --  101   CO2 29 30 29   AGAP 9  --  7   BUN 7  --  6   CREATININE 0.72  --  0.58*   CALCIUM 13.8*  --  9.2       Coags    Recent Labs     24   INR 1.06 1.34*   PTT 33  --         Additional Electrolytes  Recent Labs     24  0532 24  0536   MG 2.4  --  2.2  --    PHOS 6.3*  --  3.7  --    CAIONIZED  1.79* 1.96*  --  1.15          Blood Gas    No recent results  Recent Labs     05/31/24  2315   PHVEN 7.448*   PPO8ZCT 41.5*   PO2VEN 42.6   [id number] 28.1   BEVEN 3.7   [id number] 79.4    LFTs  Recent Labs     05/30/24  1714   ALT 22   AST 27   ALKPHOS 128*   ALB 3.8   TBILI 0.72       Infectious  Recent Labs     05/30/24 2001 06/01/24  0532   PROCALCITONI <0.05 0.08     Glucose  Recent Labs     05/30/24  1714 05/31/24  0533 05/31/24  2047 06/01/24  0532   GLUC 80 95 179* 91               Lilian Rodriguez, DO

## 2024-06-01 NOTE — RESPIRATORY THERAPY NOTE
Assisted in Bedside Bronch followed by Trcah Change from 6 cuffed shiley to 6 XLT distal. Pt tolerated well

## 2024-06-01 NOTE — ASSESSMENT & PLAN NOTE
Presented from PCP with hypoxia with desat in 70s and increased WOB, admitted for, Multilobar PNA on dependent vent   Sepsis Criteria meet: due to leukocytosis and tachypnea.  Suspect the source of pneumonia  CT PE chest: multilobar PNA, most severe in JOÉS MANUEL, aspiration not excluded, bilateral atelectasis   Started on Zoysn abx and deescalated to Ceftriaxone on 5/31  Blood cultures NGTD     Recent Labs     05/30/24 2001 06/01/24  0532   PROCALCITONI <0.05 0.08     Recent Labs     05/31/24  0533 05/31/24 2047 06/01/24  0532   WBC 9.26 17.11* 12.35*     Continue Ceftriaxone qd, day 2/7  F/u Sputum cultures  F/u Blood cultures  F/u Strep/Legionella Urine   Continue Xopenex, Hypertonic Nebs  Continue Mucinex

## 2024-06-01 NOTE — RESPIRATORY THERAPY NOTE
Assisted physician with bedside bronch. Disposable scope was used. No saline was instilled. Trach was exchanged to a distal XLT by pulm physician.

## 2024-06-01 NOTE — ASSESSMENT & PLAN NOTE
S/p tracheostomy in 9/2023 at [facility], initially 8.0 cuffed,downsized to a Shiley 4 then capping trial.  Several days into the capping trial he developed aspiration pneumonia and ended up being hospitalized at [facility] with a upsized tracheostomy to 8.0, he had a bronchoscopy performed and required full ventilator support at that time.  Trach exchanged to 6.0 cuffed on 3/18/2024  Home Vent A/ VC withTV 550, RR 24, MV 12-14 L, 100% spontaneous triggers  Concern for increased vagal stimulation resulting hypoxic bradycardia 2/2 to increased secretions and position of trach with mucus plugging    Plan:  Bronch, will have Trach 6 XL at bedside  Consider ENT for trach exchange  Monitor for s/s of infection, currently has increased secretions, likely 2/2 to PNA

## 2024-06-01 NOTE — NURSING NOTE
2029: RN in room and vent alarming high pressure, suctioned patient and had minimal secretion after suctioning patient tele reading asystole no pulse identified. Compressions started Code Blue initiated. CCAP Grazyna Goldstein at bedside. ROSC achieved at 2035. Patient having L sided gaze and having tremors/shaking, 2mg ativan given. STAT labs drawn. See flowsheets for vitals. Patient following commands post code. CCAP to call and update family on events.

## 2024-06-01 NOTE — ASSESSMENT & PLAN NOTE
S/p tracheostomy in 9/2023 at [facility], initially 8.0 cuffed,downsized to a Shiley 4 then capping trial.  Several days into the capping trial he developed aspiration pneumonia and ended up being hospitalized at [facility] with a upsized tracheostomy to 8.0, he had a bronchoscopy performed and required full ventilator support at that time.  Trach exchanged to 6.0 cuffed on 3/18/2024  Home Vent A/ VC withTV 550, RR 24, MV 12-14 L, 100% spontaneous triggers  Concern for increased vagal stimulation resulting hypoxic bradycardia 2/2 to increased secretions and position of trach with mucus plugging  S/p Bronch on 06/01, exchanged Trach for 6XL during bronch    Plan:  Monitor for s/s of infection, currently has increased secretions, likely 2/2 to PNA

## 2024-06-01 NOTE — ASSESSMENT & PLAN NOTE
Testicular cancer s/p left orchiectomy on 12/2022  Repeat CT scan in January 2023 with enlarging lymph node.  Began Chemo in March 2023: Etoposide and Cisplatin with plan for 4 cycles, 5 days every 21 days  Did not complete chemo treatment due to adverse effects  After 3rd cycle reported double vision, labile affect and right sided weakness and therefore chemotherapy was stopped on 5/26/23  Outpatient follow up with Heme/Onc

## 2024-06-01 NOTE — ASSESSMENT & PLAN NOTE
Presented from PCP with hypoxia with desat in 70s and increased WOB, admitted for, Multilobar PNA on dependent vent   Sepsis Criteria meet: due to leukocytosis and tachypnea.  Suspect the source of pneumonia  CT PE chest: multilobar PNA, most severe in [place], aspiration not excluded, bilateral atelectasis   Started on Zoysn abx and deescalated to Ceftriaxone on 5/31  Blood cultures NGTD     Recent Labs     05/30/24 2001 06/01/24  0532   PROCALCITONI <0.05 0.08       Recent Labs     05/31/24  0533 05/31/24 2047 06/01/24  0532   WBC 9.26 17.11* 12.35*       Continue Ceftriaxone qd, day 3/7  F/u Sputum cultures  F/u Blood cultures  F/u Strep/Legionella Urine   Continue Xopenex, Hypertonic Nebs  Continue Mucinex

## 2024-06-01 NOTE — ASSESSMENT & PLAN NOTE
Concern for increased vagal stimulation resulting hypoxic bradycardia 2/2 to increased secretions and position of trach with mucus plugging  Patient had 4 episodes of bradycardia into 20-30s due to increased trach secretions with mucus plugging, worsening depended on trach position, thought to stimulate vagal bradycardia. Of note on 5/31, 1x episode resulted in  pulseless asystole without hypoxia, s/p 2 x of epi, bicarb, and calcium, PEA on monitor, ROSC achieved with clearance of secretions.   S/p tracheostomy in 9/2023 at [facility], initially 8.0 cuffed,downsized to a Shiley 4 then capping trial.  Several days into the capping trial he developed aspiration pneumonia and ended up being hospitalized at [facility] with a upsized tracheostomy to 8.0, he had a bronchoscopy performed and required full ventilator support at that time.  Trach exchanged to 6.0 cuffed on 3/18/2024  Home Vent A/ VC withTV 550, RR 24, MV 12-14 L, 100% spontaneous triggers  S/p Bronch on 06/01, exchanged Trach for 6XL during bronch, no further bradycardic events occurred throughout the day  06/02-brooke to 35, hypoxic to 50%, suctioned, noted to have seizure-like activity, 2mg Ativan given     Plan:  Neurology consulted for concerns for seizure activity  Consider EEG  Ativan 2mg q8h prn for 1 generalized tonic clonic seizure or 2 complex partial seizures in less than 3 hours.  May repeat x 1. MAX of 2 doses in 24 hours

## 2024-06-02 PROBLEM — R56.9 SEIZURE-LIKE ACTIVITY (HCC): Status: ACTIVE | Noted: 2024-06-02

## 2024-06-02 LAB
ANION GAP SERPL CALCULATED.3IONS-SCNC: 8 MMOL/L (ref 4–13)
ATRIAL RATE: 114 BPM
ATRIAL RATE: 65 BPM
BASOPHILS # BLD AUTO: 0.06 THOUSANDS/ÂΜL (ref 0–0.1)
BASOPHILS NFR BLD AUTO: 1 % (ref 0–1)
BUN SERPL-MCNC: 6 MG/DL (ref 5–25)
CA-I BLD-SCNC: 1.17 MMOL/L (ref 1.12–1.32)
CALCIUM SERPL-MCNC: 9 MG/DL (ref 8.4–10.2)
CHLORIDE SERPL-SCNC: 102 MMOL/L (ref 96–108)
CO2 SERPL-SCNC: 27 MMOL/L (ref 21–32)
CREAT SERPL-MCNC: 0.68 MG/DL (ref 0.6–1.3)
EOSINOPHIL # BLD AUTO: 0.06 THOUSAND/ÂΜL (ref 0–0.61)
EOSINOPHIL NFR BLD AUTO: 1 % (ref 0–6)
ERYTHROCYTE [DISTWIDTH] IN BLOOD BY AUTOMATED COUNT: 16.4 % (ref 11.6–15.1)
GFR SERPL CREATININE-BSD FRML MDRD: 122 ML/MIN/1.73SQ M
GLUCOSE SERPL-MCNC: 84 MG/DL (ref 65–140)
HCT VFR BLD AUTO: 36.2 % (ref 36.5–49.3)
HGB BLD-MCNC: 11.7 G/DL (ref 12–17)
IMM GRANULOCYTES # BLD AUTO: 0.11 THOUSAND/UL (ref 0–0.2)
IMM GRANULOCYTES NFR BLD AUTO: 1 % (ref 0–2)
L PNEUMO1 AG UR QL IA.RAPID: NEGATIVE
LYMPHOCYTES # BLD AUTO: 1.25 THOUSANDS/ÂΜL (ref 0.6–4.47)
LYMPHOCYTES NFR BLD AUTO: 16 % (ref 14–44)
MAGNESIUM SERPL-MCNC: 2.2 MG/DL (ref 1.9–2.7)
MCH RBC QN AUTO: 29.4 PG (ref 26.8–34.3)
MCHC RBC AUTO-ENTMCNC: 32.3 G/DL (ref 31.4–37.4)
MCV RBC AUTO: 91 FL (ref 82–98)
MONOCYTES # BLD AUTO: 0.66 THOUSAND/ÂΜL (ref 0.17–1.22)
MONOCYTES NFR BLD AUTO: 8 % (ref 4–12)
NEUTROPHILS # BLD AUTO: 5.77 THOUSANDS/ÂΜL (ref 1.85–7.62)
NEUTS SEG NFR BLD AUTO: 73 % (ref 43–75)
NRBC BLD AUTO-RTO: 0 /100 WBCS
P AXIS: 35 DEGREES
P AXIS: 45 DEGREES
PHOSPHATE SERPL-MCNC: 3.6 MG/DL (ref 2.7–4.5)
PLATELET # BLD AUTO: 220 THOUSANDS/UL (ref 149–390)
PMV BLD AUTO: 9.5 FL (ref 8.9–12.7)
POTASSIUM SERPL-SCNC: 3.7 MMOL/L (ref 3.5–5.3)
PR INTERVAL: 164 MS
PR INTERVAL: 166 MS
QRS AXIS: 100 DEGREES
QRS AXIS: 87 DEGREES
QRSD INTERVAL: 76 MS
QRSD INTERVAL: 86 MS
QT INTERVAL: 326 MS
QT INTERVAL: 392 MS
QTC INTERVAL: 407 MS
QTC INTERVAL: 449 MS
RBC # BLD AUTO: 3.98 MILLION/UL (ref 3.88–5.62)
S PNEUM AG UR QL: NEGATIVE
SODIUM SERPL-SCNC: 137 MMOL/L (ref 135–147)
T WAVE AXIS: -28 DEGREES
T WAVE AXIS: 24 DEGREES
VENTRICULAR RATE: 114 BPM
VENTRICULAR RATE: 65 BPM
WBC # BLD AUTO: 7.91 THOUSAND/UL (ref 4.31–10.16)

## 2024-06-02 PROCEDURE — 84100 ASSAY OF PHOSPHORUS: CPT

## 2024-06-02 PROCEDURE — 94669 MECHANICAL CHEST WALL OSCILL: CPT

## 2024-06-02 PROCEDURE — 94003 VENT MGMT INPAT SUBQ DAY: CPT

## 2024-06-02 PROCEDURE — 93010 ELECTROCARDIOGRAM REPORT: CPT | Performed by: INTERNAL MEDICINE

## 2024-06-02 PROCEDURE — 99255 IP/OBS CONSLTJ NEW/EST HI 80: CPT | Performed by: PSYCHIATRY & NEUROLOGY

## 2024-06-02 PROCEDURE — 94640 AIRWAY INHALATION TREATMENT: CPT

## 2024-06-02 PROCEDURE — 94150 VITAL CAPACITY TEST: CPT

## 2024-06-02 PROCEDURE — 86255 FLUORESCENT ANTIBODY SCREEN: CPT | Performed by: PHYSICIAN ASSISTANT

## 2024-06-02 PROCEDURE — 85025 COMPLETE CBC W/AUTO DIFF WBC: CPT

## 2024-06-02 PROCEDURE — 83735 ASSAY OF MAGNESIUM: CPT

## 2024-06-02 PROCEDURE — 86341 ISLET CELL ANTIBODY: CPT

## 2024-06-02 PROCEDURE — 94760 N-INVAS EAR/PLS OXIMETRY 1: CPT

## 2024-06-02 PROCEDURE — 4A00X4Z MEASUREMENT OF CENTRAL NERVOUS ELECTRICAL ACTIVITY, EXTERNAL APPROACH: ICD-10-PCS | Performed by: INTERNAL MEDICINE

## 2024-06-02 PROCEDURE — 80048 BASIC METABOLIC PNL TOTAL CA: CPT

## 2024-06-02 PROCEDURE — 99291 CRITICAL CARE FIRST HOUR: CPT | Performed by: INTERNAL MEDICINE

## 2024-06-02 PROCEDURE — 82330 ASSAY OF CALCIUM: CPT

## 2024-06-02 RX ORDER — LORAZEPAM 2 MG/ML
INJECTION INTRAMUSCULAR
Status: COMPLETED
Start: 2024-06-02 | End: 2024-06-02

## 2024-06-02 RX ORDER — ATROPINE SULFATE 1 MG/ML
INJECTION, SOLUTION INTRAVENOUS
Status: DISPENSED
Start: 2024-06-02 | End: 2024-06-03

## 2024-06-02 RX ORDER — LORAZEPAM 2 MG/ML
2 INJECTION INTRAMUSCULAR ONCE
Status: COMPLETED | OUTPATIENT
Start: 2024-06-02 | End: 2024-06-02

## 2024-06-02 RX ORDER — LORAZEPAM 2 MG/ML
2 INJECTION INTRAMUSCULAR EVERY 8 HOURS PRN
Status: DISCONTINUED | OUTPATIENT
Start: 2024-06-02 | End: 2024-06-02

## 2024-06-02 RX ORDER — LORAZEPAM 2 MG/ML
2 INJECTION INTRAMUSCULAR EVERY 8 HOURS PRN
Status: DISCONTINUED | OUTPATIENT
Start: 2024-06-02 | End: 2024-06-04

## 2024-06-02 RX ORDER — ATROPINE SULFATE 1 MG/ML
0.5 INJECTION, SOLUTION INTRAVENOUS ONCE AS NEEDED
Status: DISCONTINUED | OUTPATIENT
Start: 2024-06-02 | End: 2024-06-10 | Stop reason: HOSPADM

## 2024-06-02 RX ORDER — FLUOXETINE 10 MG/1
10 CAPSULE ORAL DAILY
Status: DISCONTINUED | OUTPATIENT
Start: 2024-06-02 | End: 2024-06-10 | Stop reason: HOSPADM

## 2024-06-02 RX ADMIN — LORAZEPAM 2 MG: 2 INJECTION INTRAMUSCULAR; INTRAVENOUS at 20:32

## 2024-06-02 RX ADMIN — LEVALBUTEROL HYDROCHLORIDE 1.25 MG: 1.25 SOLUTION RESPIRATORY (INHALATION) at 19:37

## 2024-06-02 RX ADMIN — CHLORHEXIDINE GLUCONATE 15 ML: 1.2 RINSE ORAL at 22:39

## 2024-06-02 RX ADMIN — SODIUM CHLORIDE SOLN NEBU 3% 4 ML: 3 NEBU SOLN at 13:48

## 2024-06-02 RX ADMIN — SODIUM CHLORIDE SOLN NEBU 3% 4 ML: 3 NEBU SOLN at 07:47

## 2024-06-02 RX ADMIN — LEVALBUTEROL HYDROCHLORIDE 1.25 MG: 1.25 SOLUTION RESPIRATORY (INHALATION) at 02:10

## 2024-06-02 RX ADMIN — APIXABAN 5 MG: 5 TABLET, FILM COATED ORAL at 09:29

## 2024-06-02 RX ADMIN — LEVALBUTEROL HYDROCHLORIDE 1.25 MG: 1.25 SOLUTION RESPIRATORY (INHALATION) at 07:47

## 2024-06-02 RX ADMIN — FLUOXETINE 10 MG: 10 CAPSULE ORAL at 11:30

## 2024-06-02 RX ADMIN — GUAIFENESIN 200 MG: 200 SOLUTION ORAL at 16:00

## 2024-06-02 RX ADMIN — CEFTRIAXONE 2000 MG: 2 INJECTION, POWDER, FOR SOLUTION INTRAMUSCULAR; INTRAVENOUS at 09:29

## 2024-06-02 RX ADMIN — LEVALBUTEROL HYDROCHLORIDE 1.25 MG: 1.25 SOLUTION RESPIRATORY (INHALATION) at 13:48

## 2024-06-02 RX ADMIN — GUAIFENESIN 200 MG: 200 SOLUTION ORAL at 04:47

## 2024-06-02 RX ADMIN — LORAZEPAM 2 MG: 2 INJECTION INTRAMUSCULAR at 05:49

## 2024-06-02 RX ADMIN — CHLORHEXIDINE GLUCONATE 15 ML: 1.2 RINSE ORAL at 09:29

## 2024-06-02 RX ADMIN — GUAIFENESIN 200 MG: 200 SOLUTION ORAL at 09:29

## 2024-06-02 RX ADMIN — GUAIFENESIN 200 MG: 200 SOLUTION ORAL at 22:40

## 2024-06-02 RX ADMIN — SODIUM CHLORIDE SOLN NEBU 3% 4 ML: 3 NEBU SOLN at 02:10

## 2024-06-02 RX ADMIN — APIXABAN 5 MG: 5 TABLET, FILM COATED ORAL at 17:32

## 2024-06-02 RX ADMIN — LORAZEPAM 2 MG: 2 INJECTION INTRAMUSCULAR; INTRAVENOUS at 05:49

## 2024-06-02 RX ADMIN — SODIUM CHLORIDE SOLN NEBU 3% 4 ML: 3 NEBU SOLN at 19:37

## 2024-06-02 NOTE — PROGRESS NOTES
[facility]  Progress Note  Name: Hemanth Swanson I  MRN: [id number]  Unit/Bed#: ICU 12 I Date of Admission: 5/30/2024   Date of Service: 6/2/2024 I Hospital Day: 3    Assessment & Plan   * Sepsis (HCC)  Assessment & Plan  Presented from PCP with hypoxia with desat in 70s and increased WOB, admitted for, Multilobar PNA on dependent vent   Sepsis Criteria meet: due to leukocytosis and tachypnea.  Suspect the source of pneumonia  CT PE chest: multilobar PNA, most severe in [place] MANUEL, aspiration not excluded, bilateral atelectasis   Started on Zoysn abx and deescalated to Ceftriaxone on 5/31  Blood cultures NGTD     Recent Labs     05/30/24 2001 06/01/24  0532   PROCALCITONI <0.05 0.08       Recent Labs     05/31/24  0533 05/31/24 2047 06/01/24  0532   WBC 9.26 17.11* 12.35*       Continue Ceftriaxone qd, day 3/7  F/u Sputum cultures  F/u Blood cultures  F/u Strep/Legionella Urine   Continue Xopenex, Hypertonic Nebs  Continue Mucinex     Bradycardia  Assessment & Plan  Concern for increased vagal stimulation resulting hypoxic bradycardia 2/2 to increased secretions and position of trach with mucus plugging  Patient had 4 episodes of bradycardia into 20-30s due to increased trach secretions with mucus plugging, worsening depended on trach position, thought to stimulate vagal bradycardia. Of note on 5/31, 1x episode resulted in  pulseless asystole without hypoxia, s/p 2 x of epi, bicarb, and calcium, PEA on monitor, ROSC achieved with clearance of secretions.   S/p tracheostomy in 9/2023 at [facility], initially 8.0 cuffed,downsized to a Shiley 4 then capping trial.  Several days into the capping trial he developed aspiration pneumonia and ended up being hospitalized at [facility] with a upsized tracheostomy to 8.0, he had a bronchoscopy performed and required full ventilator support at that time.  Trach exchanged to 6.0 cuffed on 3/18/2024  Home Vent A/ VC withTV 550, RR 24, MV 12-14 L,  100% spontaneous triggers  S/p Bronch on 06/01, exchanged Trach for 6XL during bronch, no further bradycardic events occurred throughout the day  06/02-brooke to 35, hypoxic to 50%, suctioned, noted to have seizure-like activity, 2mg Ativan given     Plan:  Neurology consulted for concerns for seizure activity  Consider EEG  Ativan 2mg q8h prn for 1 generalized tonic clonic seizure or 2 complex partial seizures in less than 3 hours.  May repeat x 1. MAX of 2 doses in 24 hours     Impaired physical mobility  Assessment & Plan  -PT/OT for eval  -Case management for placement    Status post tracheostomy ([facility])  Assessment & Plan  S/p tracheostomy in 9/2023 at [facility], initially 8.0 cuffed,downsized to a Shiley 4 then capping trial.  Several days into the capping trial he developed aspiration pneumonia and ended up being hospitalized at [facility] with a upsized tracheostomy to 8.0, he had a bronchoscopy performed and required full ventilator support at that time.  Trach exchanged to 6.0 cuffed on 3/18/2024  Home Vent A/ VC withTV 550, RR 24, MV 12-14 L, 100% spontaneous triggers  Concern for increased vagal stimulation resulting hypoxic bradycardia 2/2 to increased secretions and position of trach with mucus plugging  S/p Bronch on 06/01, exchanged Trach for 6XL during bronch    Plan:  Monitor for s/s of infection, currently has increased secretions, likely 2/2 to PNA    S/P percutaneous endoscopic gastrostomy (PEG) tube placement ([facility])  Assessment & Plan  -Monitor for s/s infection  -Holding Tube feeds    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[facility])  Assessment & Plan  Testicular cancer s/p left orchiectomy on 12/2022  Repeat CT scan in January 2023 with enlarging lymph node.  Began Chemo in March 2023: Etoposide and Cisplatin with plan for 4 cycles, 5 days every 21 days  Did not complete chemo treatment due to adverse effects  After 3rd cycle reported double vision, labile affect and right sided weakness and therefore  chemotherapy was stopped on 5/26/23  Outpatient follow up with Heme/Onc     Seizure-like activity ([facility])  Assessment & Plan  2x seizure activity, went bradycardic and hypoxic, unsure if seizure activity causes bradycardia/hypoxia or hypoxia/bradycardia causes seizure activity  05/31-06/01 overnight, pulseless asystole without hypoxia, s/p 2 x of epi, bicarb, and calcium, PEA on monitor, ROSC achieved with clearance of secretions, followed byseizure like activity, ativan 2mg given, seizure activity aborted  06/02-brooke to 35, hypoxic to 50%, suctioned, noted to have seizure-like activity, 2mg Ativan given     Plan:  Neurology consulted for concerns for seizure activity  Consider EEG  Ativan 2mg q8h prn for 1 generalized tonic clonic seizure or 2 complex partial seizures in less than 3 hours.  May repeat x 1. MAX of 2 doses in 24 hours   Seizure precautions    Neuromuscular disorder ([facility])  Assessment & Plan  Hx of Testicular CA, S/P 3 cycles CDDP/ ,  then developed neuromuscular syndrome including urinary incontinence and resp failure resulting in vent-dependent, has not established with Neuro  Neurology outpatient on 7/17    Ventilator dependent ([facility])  Assessment & Plan  Hx of Testicular CA, S/P 3 cycles CDDP/ ,  then developed neuromuscular syndrome including urinary incontinence and resp failure resulting in vent-dependent, has not established with Neuro  History of anoxic brain injury and unknown neuromuscular disorder  S/p tracheostomy in 9/2023 at [facility], initially 8.0 cuffed,downsized to a Shiley 4 then capping trial.  Several days into the capping trial he developed aspiration pneumonia and ended up being hospitalized at [facility] with a upsized tracheostomy to 8.0, he had a bronchoscopy performed and required full ventilator support at that time.  Trach exchanged to 6.0 cuffed on 3/18/2024  Ventilator dependent 24/7  Continue home vent settings: 18/500/40%/6               Disposition:  Critical care    ICU Core Measures     Vented Patient  VAP Bundle  VAP bundle ordered     A: Assess, Prevent, and Manage Pain Has pain been assessed? Yes  Need for changes to pain regimen? No   B: Both Spontaneous Awakening Trials (SATs) and Spontaneous Breathing Trials (SBTs) Plan to perform spontaneous awakening trial today? Chronic vent   Plan to perform spontaneous breathing trial today? Chronic vent   Obvious barriers to extubation? NA   C: Choice of Sedation RASS Goal: N/A patient not on sedation  Need for changes to sedation or analgesia regimen? NA   D: Delirium CAM-ICU: Negative   E: Early Mobility  Plan for early mobility? No   F: Family Engagement Plan for family engagement today? Yes       Antibiotic Review: Patient on appropriate coverage based on culture data.     Review of Invasive Devices:      Central access plan: Medications requiring central line      Prophylaxis:  VTE VTE covered by:  apixaban, Per PEG Tube, 5 mg at 06/01/24 1747       Stress Ulcer  not ordered         Significant 24hr Events     24hr events:   At 530am, patient became hypoxic to 70% down to 50% and bradycardic to 35. Patient evaluated-appeared diaphoretic with eyes rolling back, with slight tonic clonic movements. Ativan 2mg given, seizure activity stopped. Patient able to open eyes and responded he was ok. Reevaluated patient around 7:40, patient somnolent from Ativan 2mg. Trach in place, vital signs stable.          Subjective   Review of Systems: Review of Systems   Unable to perform ROS: Mental status change   Somnolent from Ativan, difficult to arouse     Objective                            Vitals I/O      Most Recent Min/Max in 24hrs   Temp 98.3 °F (36.8 °C) Temp  Min: 98.3 °F (36.8 °C)  Max: 100 °F (37.8 °C)   Pulse (!) 38 Pulse  Min: 38  Max: 103   Resp (!) 35 Resp  Min: 17  Max: 35   /70 BP  Min: 84/54  Max: 126/73   O2 Sat 97 % SpO2  Min: 59 %  Max: 100 %      Intake/Output Summary (Last 24 hours) at 6/2/2024  0756  Last data filed at 2024 0516  Gross per 24 hour   Intake 1150 ml   Output 1975 ml   Net -825 ml       Diet NPO    Invasive Monitoring   None        Physical Exam   Physical Exam  Vitals and nursing note reviewed.   Skin:     General: Skin is warm.   Cardiovascular:      Rate and Rhythm: Normal rate and regular rhythm.   Musculoskeletal:      Right lower leg: No edema.      Left lower leg: No edema.   Abdominal: General: There is no distension.      Palpations: Abdomen is soft.      Tenderness: There is no abdominal tenderness.   Constitutional:       Appearance: He is ill-appearing (chronic) and diaphoretic.      Comments: Somnolent s/p 2mg Ativan,   trach in place, /   Pulmonary:      Breath sounds: Rhonchi (diffuse) present.      Comments: trach in place,   Neurological:      Mental Status: He is somnolent.   Genitourinary/Anorectal:  Sheppard present.          Diagnostic Studies      Reviewed tele: NSR 68 rate, bradycardic to 35 around 5:30am  Imagin/31 CXR: Worsening bilateral consolidation which may be a combination of edema and pneumonia. Persistent bibasilar atelectasis.    CXR: Low lung volumes with bilateral lower lobe opacities reflecting atelectasis versus infiltrates, progressed.       PE CT:   Dense consolidation in the lower lobes reidentified may represent chronic atelectasis.New groundglass mixed with alveolar opacities in the remainder of the lung fields most severe in the left upper lobe consistent with multi lobar pneumonia, aspiration not excluded. Interval development of mild multilevel superior plate compression deformities at T11-L2 with sclerosis suggesting chronic deformities. Correlate for focal back tenderness.     I have personally reviewed pertinent reports.       Medications:  Scheduled PRN   apixaban, 5 mg, BID  cefTRIAXone, 2,000 mg, Q24H  chlorhexidine, 15 mL, Q12H ISAEL  guaiFENesin, 200 mg, Q6H  levalbuterol, 1.25 mg, Q6H  lidocaine (PF), 10  mL, Once  sodium chloride, 4 mL, Q6H      LORazepam, 2 mg, Q8H PRN       Continuous          Labs:    CBC    Recent Labs     05/31/24 2047 05/31/24 2050 06/01/24  0532 06/02/24  0454   WBC 17.11*  --  12.35* 7.91   HGB 13.0   < > 11.5* 11.7*   HCT 41.6   < > 35.5* 36.2*     --  239 220   BANDSPCT 3  --   --   --     < > = values in this interval not displayed.     BMP    Recent Labs     06/01/24 0532 06/02/24  0454   SODIUM 137 137   K 3.4* 3.7    102   CO2 29 27   AGAP 7 8   BUN 6 6   CREATININE 0.58* 0.68   CALCIUM 9.2 9.0       Coags    Recent Labs     05/31/24 2118   INR 1.34*        Additional Electrolytes  Recent Labs     06/01/24 0532 06/01/24  0536 06/02/24  0454   MG 2.2  --  2.2   PHOS 3.7  --  3.6   CAIONIZED  --  1.15 1.17          Blood Gas    No recent results  Recent Labs     05/31/24  2315   PHVEN 7.448*   UJT9IPM 41.5*   PO2VEN 42.6   AES1SPL 28.1   BEVEN 3.7   O6FUWTG 79.4    LFTs  No recent results    Infectious  Recent Labs     06/01/24  0532   PROCALCITONI 0.08     Glucose  Recent Labs     05/31/24 2047 06/01/24 0532 06/02/24  0454   GLUC 179* 91 84               Lilian Rodriguez,

## 2024-06-02 NOTE — ASSESSMENT & PLAN NOTE
2x seizure activity, went bradycardic and hypoxic, unsure if seizure activity causes bradycardia/hypoxia or hypoxia/bradycardia causes seizure activity  05/31-06/01 overnight, pulseless asystole without hypoxia, s/p 2 x of epi, bicarb, and calcium, PEA on monitor, ROSC achieved with clearance of secretions, followed byseizure like activity, ativan 2mg given, seizure activity aborted  06/02-brooke to 35, hypoxic to 50%, suctioned, noted to have seizure-like activity, 2mg Ativan given     Plan:  Neurology consulted for concerns for seizure activity  Consider EEG  Ativan 2mg q8h prn for 1 generalized tonic clonic seizure or 2 complex partial seizures in less than 3 hours.  May repeat x 1. MAX of 2 doses in 24 hours   Seizure precautions

## 2024-06-02 NOTE — QUICK NOTE
Around 1:38, Patient turned to place chest vest therapy. Became bradycardic to 20s, TV reduced, then became hypoxic to 50%. Patient suctioned. Patient responded.     Also noted to have similar episode around 111:55 am    Assessment/Plan:    1  Orthostatic hypotension  -     POCT ECG  -     midodrine (PROAMATINE) 5 mg tablet; Take 1 tablet (5 mg total) by mouth 3 (three) times a day before meals    bp is normal at this time and pt is asymptomatic - pt did go home drink a lot of poweraid and lots and lots of salt  Pt will be going to a second opinion  Pt's miododrin will be increased today      EKG - NSR no ischemia    There are no Patient Instructions on file for this visit  Return for Next scheduled follow up  Subjective:      Patient ID: Jesus Webster is a 49 y o  male  Chief Complaint   Patient presents with    Hypotension     patient says his BP was 94/52 with a pulse of 58  79/57 20 min after jlopezcma        Pt states he had a bad day with huis BP  Pt states at 10:30 it was 94/52 and at 11:30 it 79/57  Pt tates he was dizzy during these episodes  States he had to leave work , states he felt like he was going to pass out  Pt states he went home her drank gatoraid and eat salt  He went home drant two gatoraids, shiops, heavily slated popcorn and barnett  He feels fine now      The following portions of the patient's history were reviewed and updated as appropriate: allergies, current medications, past family history, past medical history, past social history, past surgical history and problem list     Review of Systems   Constitutional: Negative for activity change, appetite change, chills, diaphoresis, fatigue, fever and unexpected weight change  HENT: Negative for congestion, dental problem, ear pain, mouth sores, sinus pressure, sinus pain, sore throat and trouble swallowing  Eyes: Negative for photophobia, discharge and itching  Respiratory: Negative for apnea, chest tightness and shortness of breath  Cardiovascular: Negative for chest pain, palpitations and leg swelling  Hypotension   Gastrointestinal: Negative for abdominal distention, abdominal pain, blood in stool, nausea and vomiting  Endocrine: Negative for cold intolerance, heat intolerance, polydipsia, polyphagia and polyuria  Genitourinary: Negative for difficulty urinating  Musculoskeletal: Negative for arthralgias  Skin: Negative for color change and wound  Neurological: Positive for dizziness and light-headedness  Negative for syncope, speech difficulty and headaches  Hematological: Negative for adenopathy  Psychiatric/Behavioral: Negative for agitation and behavioral problems  Current Outpatient Medications   Medication Sig Dispense Refill    acetaminophen (TYLENOL) 325 mg tablet Take 3 tablets (975 mg total) by mouth every 8 (eight) hours 30 tablet 0    aspirin 81 mg chewable tablet Chew 1 tablet (81 mg total) daily 30 tablet 0    atorvastatin (LIPITOR) 40 mg tablet Take 1 tablet (40 mg total) by mouth daily 90 tablet 0    clonazePAM (KlonoPIN) 1 mg tablet as needed       fludrocortisone (FLORINEF) 0 1 mg tablet Take 4 tablets (0 4 mg total) by mouth daily 120 tablet 0    midodrine (PROAMATINE) 5 mg tablet Take 1 tablet (5 mg total) by mouth 3 (three) times a day before meals 90 tablet 3    OXcarbazepine (TRILEPTAL) 150 mg tablet Take 150 mg by mouth 2 (two) times a day       paliperidone (INVEGA) 6 MG 24 hr tablet Take 1 tablet (6 mg total) by mouth 2 (two) times a day 60 tablet 3    pantoprazole (PROTONIX) 20 mg tablet Take 1 tablet (20 mg total) by mouth daily 90 tablet 3    potassium chloride (K-DUR,KLOR-CON) 20 mEq tablet Take 1 tablet (20 mEq total) by mouth daily 30 tablet 5    traZODone (DESYREL) 300 MG tablet Take 300 mg by mouth daily at bedtime      venlafaxine (EFFEXOR) 75 mg tablet Take 1 tablet (75 mg total) by mouth 3 (three) times a day 270 tablet 0    paliperidone palmitate ER (Invega Sustenna) 234 mg/1 5 mL IM injection Inject 234 mg into a muscle every 30 (thirty) days       No current facility-administered medications for this visit          Objective:    /70   Pulse 92   Temp 98 8 °F (37 1 °C)   Resp 16   Ht 6' (1 829 m)   Wt 91 6 kg (202 lb)   SpO2 95%   BMI 27 40 kg/m²        Physical Exam  Vitals signs and nursing note reviewed  Constitutional:       General: He is not in acute distress  Appearance: He is well-developed  He is not diaphoretic  HENT:      Head: Normocephalic and atraumatic  Right Ear: External ear normal       Left Ear: External ear normal       Nose: Nose normal       Mouth/Throat:      Pharynx: No oropharyngeal exudate  Eyes:      General: No scleral icterus  Right eye: No discharge  Left eye: No discharge  Pupils: Pupils are equal, round, and reactive to light  Neck:      Thyroid: No thyromegaly  Cardiovascular:      Rate and Rhythm: Normal rate  Heart sounds: Normal heart sounds  No murmur  Pulmonary:      Effort: Pulmonary effort is normal  No respiratory distress  Breath sounds: Normal breath sounds  No wheezing  Abdominal:      General: Bowel sounds are normal  There is no distension  Palpations: Abdomen is soft  There is no mass  Tenderness: There is no abdominal tenderness  There is no guarding or rebound  Musculoskeletal: Normal range of motion  Skin:     General: Skin is warm and dry  Findings: No erythema or rash  Neurological:      Mental Status: He is alert        Coordination: Coordination normal       Deep Tendon Reflexes: Reflexes normal    Psychiatric:         Behavior: Behavior normal                 Zina Borden DO

## 2024-06-02 NOTE — RESPIRATORY THERAPY NOTE
Working with RN to place Vest under pt. Pt was suctioned for mod thick yellow secretions prior to laying flat. Pt did well laying flat. Once turned on his side pt brooke down to 33. After he started bradying then vent started alarming low VT. Vt was 111 and his Sat started dropping to 50. Immediately brought head of the up and RN did sternal rub and Bagged the pt. HR came up and Sat returned to 99. Placed back on the vent with normal VT.

## 2024-06-02 NOTE — SPEECH THERAPY NOTE
Speech Language/Pathology Cx    S/w critical care team, pt not medically appropriate for ST evaluation at this time. ST to f/u as able for evaluation when medically/clinically appropriate.

## 2024-06-02 NOTE — ASSESSMENT & PLAN NOTE
6/3/2024: New to this neuro team today seen in follow-up after initial neuro consult done yesterday is this 39 y.o. male with vent dependent trach and peg (9/2023), who has an undiagnosed neuromuscular syndrome, despite both inpatient and outpatient workups.  Question also whether or not he may have sustained an anoxic brain injury (not evident clinically nor on prior MRI).  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  He also has a history of , h/o PE on Eliquis, depression and anxiety.  He has been seen in our outpatient neurology clinics and also been seen by [facility] neurology.   This patient was recently referred to the ED by outpatient family medicine due to intermittent hypoxia to the 70s and high concern forpneumonia. Patient diagnosed with sepsis due to multifocal PNA and is currently being treated with ceftriaxone. WBC improving from 17-->7.9. Between 5/31 and 6/2, patient has had 4 events of bradycardia +/- hypoxia with the last two events concerning for seizure like activity as detailed below in HPI, see the note above.  Concerning the neuromuscular abnormalities today his neuro exam was consistent with that done previously and detailed below with patient alert and oriented, following multistep commands, proximal>distal weakness, asymmetric neuropathy.   After extensive record review of our colleagues notes from this admission as well as previous inpatient and outpatient visits and workup, see the HPI for details, his exam consistent with large fiber neuropathy, and possible myopathy vs deconditioning. Additional work-up warranted as patient is scheduled for outpatient neuromuscular evaluation.   In regards to recent events with concern for seizure like  activity, higher suspicion for hypoperfusion/cardiogenic syncope in the setting of bradycardia +/- hypoxia. No underlying pathology that is known at this time that would make the patient more predisposed to seizures; however reasonable to repeat MRI brain w wo as the last one was completed in 11/2023 at [facility] and was motion degraded.  Will also include IAC for reevaluation of prior bilateral IAC enhancement.    Plan:  Routine EEG pending  ENS2 sent to [facility] (which includes Ma2/Ta - ab most associated with testicular cancer; [place] panel did not include this)  Obtain repeat MRI brain IAC w wo      Patient is scheduled with outpatient Neuromuscular Dr. Dc on 7/17/24.

## 2024-06-02 NOTE — NURSING NOTE
Patient's HR brooke down to 38, O2 sats in 50s and patient was diaphoretic at time had a Left upward gaze and having seizure like movements in extremities. Patient was inline suctioned and increased oxygen to 100%. 2mg of ativan given and movements and gaze stopped. Patient following commands and nodding appropriately and is back to baseline mentation. CCAP Grazyna Goldstein at bedside during event.

## 2024-06-02 NOTE — ASSESSMENT & PLAN NOTE
39 y.o. male with vent dependent trach with a previous history of PE on Eliquis referred to the ED by outpatient family medicine due to intermittent hypoxia to the 70s and high concern forpneumonia. Patient diagnosed with sepsis due to multifocal PNA and is currently being treated with ceftriaxone. WBC improving from 17-->7.9. Between 5/31 and 6/2, patient has had 4 events of bradycardia +/- hypoxia with the last two events concerning for seizure like activity as detailed below in HPI.  Both on yesterday's and today neuro exam, and through near continue observation by the critical care staff for epileptic events.   An EEG done today is within normal limits.  In regards to seizure like activity, we have a higher suspicion for hypoperfusion/cardiogenic syncope in the setting of bradycardia +/- hypoxia. No underlying pathology that is known at this time that would make the patient more predisposed to seizures; however reasonable to repeat MRI brain w wo as the last one was completed in 11/2023 at [facility] and was motion degraded.  Will also include IAC for reevaluation of prior bilateral IAC enhancement.  Plan:  ENS2 sent to [facility] (which includes Ma2/Ta - ab most associated with testicular cancer; [place] panel did not include this)  Pending repeat MRI brain IAC w wout

## 2024-06-02 NOTE — RESPIRATORY THERAPY NOTE
Called to pt's room due to desat event after becoming brooke . HR down to 27 on monitor but good femoral pulse was felt and pt was responsive. Vent was alarming low VT. Pt lavaged and suctioned for large thick mucus plugs. Pt was lavaged again and bagged. More mucus was suctioned back out. Pt Hr increased and sat returned to 97%

## 2024-06-02 NOTE — CONSULTS
Did not do spinal consultation - Neurology   Hemanth Swanson 39 y.o. male MRN: [id number]  Unit/Bed#: ICU 12 Encounter: [id number]      Assessment & Plan     Seizure-like activity (HCC)  Assessment & Plan  39 y.o. 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 Patient diagnosed with sepsis due to multifocal PNA and is currently being treated with ceftriaxone. WBC improving from 17-->7.9. Between 5/31 and 6/2, patient has had 4 events of bradycardia +/- hypoxia with the last two events concerning for seizure like activity as detailed below in HPI.    Detailed Neuro exam was performed as detailed below with patient alert and oriented, following multistep commands, proximal>distal weakness, asymmetric neuropathy.   After extensive discussion with Dr. Cordova, patient's exam consistent with large fiber neuropathy, and possible myopathy vs deconditioning. Additional work-up warranted and patient is scheduled for outpatient neuromuscular evaluation.   In regards to recent events with concern for seizure like activity, higher suspicion for hypoperfusion/cardiogenic syncope in the setting of bradycardia +/- hypoxia. No underlying pathology that is known at this time that would make the patient more predisposed to seizures; however reasonable to repeat MRI brain w wo as the last one was completed in 11/2023 at [facility] and was motion degraded.  Will also include IAC for reevaluation of prior bilateral IAC enhancement.    Plan:  Routine EEG pending  ENS2 sent to [facility] (which includes Ma2/Ta - ab most  associated with testicular cancer; [place] panel did not include this)  Obtain repeat MRI brain IAC w wo      Patient is scheduled with outpatient Neuromuscular Dr. Dc on 7/17/24.    Neuromuscular disorder (HCC)  Assessment & Plan  See above and details in HPI  Outpatient Neuromuscular follow-up      History of Present Illness     Reason for Consult / Principal Problem: Seizure like activity  Hx and PE limited by: Patient unable to speak due to trach - uses head nods and thumbs up to communicate  HPI: Hemanth Swanson is a 39 y.o. 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 Patient diagnosed with sepsis due to multifocal PNA and is currently being treated with ceftriaxone. WBC improving from 17-->7.9.    Patient has had recurrent events of bradycardia and more recently bradycardia and hypoxia. After speaking with nursing, respiratory, ICU team and reviewing chart it appears that patient has had 4 events:  5/31: HR mid 20s, pulse was palpable, initially awake and tracking but then two brief episodes where eyes rolled back and patient required bagging - in the setting of mucus plugging  Overnight on 5/31-6/1, the patient had an episode of pulseless asystole without hypoxia requiring compressions and a few doses of epi bicarb and calcium, ROSC was achieved with clearance of secretions; however following this patient reportedly demonstrated seizure-like activity.  Ativan 2 mg were given seizure activity abated.  6/1: episode where respiratory  "reported they were losing voumes due to plugging, patient became bradycardic and hypoxic and had some tremors.  Today, 6/2, patient had an episode of bradycardia to 35 with hypoxia to 50%.  Patient was suctioned and he was noted to have seizure-like activity \"limb shaking, eye flutter, eyes roiling back, diaphoretic in setting of hypoxic/vagal episodes.\"  Per nursing note, there was a period of left upward gazze as well. 2 mg of Ativan given once again which again abated episode.    Neurology consulted due to seizure like events.    Patient communicated with head nods.  He is alert and fully oriented.  He acknowledges slow worsening of weakness overtime, bilateral vertical diplopia, occasional intermittent bifrontal headaches, occasional bilateral lower extremity sharp pain.  Patient has no recollection of recent events of bradycardia/hypoxia with seizure-like behaviors.    Patient has not yet established with outpatient Neuromuscular specialist; however he has seen general neurologist, Dr. 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 She noted:  \"Patient had 2 LP test, both with normal CSF protein, negative for infections including VDRL, crypto, meningitis panel, [facility] paraneoplastic panel was negative, anti-TPO, DG and, Sjogren's, ANCA, acetylcholine receptor antibodies and antimusk  were negative.  Patient continue to treat with anticoagulation for his bilateral PEs, developed sepsis and pneumonia for which she continued to treat on ventilatory support.  Patient continues to have proximal weakness of unknown etiology.  All of his neurological testing has been negative  EMG was completed at the bedside which did show some demyelinating and axonal neuropathies with no acute denervation, no sign of motor neuron disease.  Antimyelin associated glycoprotein negative   GM1 IgM negative  SPEP with hypergammaglobulinemia (IVIG completed on 9/11, SPEP " "drawn on 9/19)\"  Patient is scheduled with outpatient Neuromuscular Dr. Dc on 7/17/24.    Additional chart review revealed:  3/15/23 MRI brain IAC w wo:  3 mm focus of enhancement in the lateral aspect of the left IAC, possibly a   mass lesion such as a schwannoma versus inflammation. Follow-up is recommended.   Linear enhancement in the lateral aspect of the right IAC, possibly related   to a vessel.   T2 and FLAIR hyperintensities in the cerebral white matter, possibly signal   changes related to migraines, mild small vessel disease, or nonspecific gliosis   or demyelination.   6/19/23: HIV screen, HIV antigen, HIV Ab, Hep C - Negative  8/12/23 continuous video EEG for 22.5hours: Intermittent excessive diffuse delta activity during drowsiness and excessive amount of slow-wave and REM sleep periods.  During this study 2 episodes were captured of leg and body movements that occurred during REM sleep suggestive of loss of REM sleep atonia  8/11/23 LP:  WBC 9, Protein 46, RBC 2500, Glucose 59  Gram stain, ME panel, culture, Lyme, Fungal, Cryptococcal negative  8/12/23:  ENS2 send out to [place] - Negative   8/12/23 MRI brain w wo:  No acute intracranial abnormality.   Stable nonspecific enhancement along the anterior floor of the third ventricle/hypothalamus when comparing to the March 15, 2023 study. Differential includes chronic toxic metabolic insult (thiamine deficiency), chronic inflammatory/ infectious conditions. Other considerations include Langerhans cell cell histiocytosis, lymphocytic hypophysitis, and neurosyphilis. Primary CNS lymphoma is a less likely consideration, particularly given the lack of interval change. Serologic correlation is recommended. Stable cerebral white matter signal abnormality, presumably the sequela of chronic microangiopathic disease given the patient's history.  8/13/23: Orexin 77  (Consistent with Narcolepsy)  8/25/23 Routine EEG: Mild background slowing  8/26/23: DARIN 65 Ab, " "RPR, Thyroid Microsomal Ab - Negative  8/30/23: AChR Modulating, Binding, Blocking; Musk Ab - Negative  9/6/23 Repeat LP:  WBC 5, Protein 43, RBC 2500, Glucose 56  ACE, ME panel, Flow cytometry, cultures - Negative  CSF VDRL negative  Orexin 82 (Consistent with Narcolepsy)  9/7/23: DG, Anti-Amarilys-1 IGG, C- ANCA, P-ANCA, Atypical pANCA, MPO Ab, IN-3 Ab, RF, Toxoplasma Ab - Negative  (9/7 Completed 7 days of Ceftriaxone to cover for possible neurosyphillis)  9/8/23 Anti VGCC Ab - Negative  9/9/23 MRI brain and orbits w wo:  No acute intracranial pathology. Unremarkable MRI of the orbits.  Improving enhancement of the hypothalamus.  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  (9/10 completed 3 days pulse dose steroids)  (9/11 completed 5 days IVIG)  9/15/23: Bedside EMG \"study did not suggest motor neuro disease...doesn't favor CIDP\" Couldn't assess for recruitment and activation of muscles  9/19/23: GM1 IgM Ab, MAG Ab, Serum/Urine Immunofixation - Negative  11/15/23 MRI brain w wo:  No acute intracranial abnormality or pathologic enhancement. The previously described 3 mm focus in the left internal auditory canal is not well evaluated due to significant motion artifact.   11/15/23: LRP4, ganglioside Ab panel - Negative  5/30/24: CT C/A/P w:  No pulmonary embolism.  Dense consolidation in the lower lobes reidentified may represent chronic atelectasis.  New groundglass mixed with alveolar opacities in the remainder of the lung fields most severe in the left upper lobe consistent with multi lobar pneumonia, aspiration not excluded.  Interval development of mild multilevel superior plate compression deformities at T11-L2 with sclerosis suggesting chronic deformities.    * Upon chart review, was unable to find if CSF cytology or ENC2 (sample never collected/sent) was tested        Inpatient consult to Neurology  Consult performed by: Irma Sharma PA-C  Consult ordered by: Lilian" "Stephanie Rodriguez DO        Review of Systems   Unable to perform ROS: Patient nonverbal   Eyes:  Positive for visual disturbance (binocular vertical diplopa).   Gastrointestinal:         Denies bowel incontience   Genitourinary:         Denies incontinence   Musculoskeletal:  Positive for myalgias (occasional pain in BLEs).   Neurological:  Positive for weakness (gradual worsening overtime) and headaches (occasional bifrontal HAs).   Patient denied pain    Historical Information   Past Medical History:   Diagnosis Date    Anxiety     Cancer (HCC)     Depression     Migration of percutaneous endoscopic gastrostomy (PEG) tube ([facility]) 09/24/2023    Psychiatric disorder     bipolar     Past Surgical History:   Procedure Laterality Date    IR BIOPSY LYMPH NODE  01/20/2023    IR GASTROSTOMY TUBE PLACEMENT  09/25/2023    IR LUMBAR PUNCTURE  09/06/2023    IR PORT PLACEMENT  03/14/2023    ORCHIECTOMY Left 12/14/2022    Procedure: ORCHIECTOMY INGUINAL;  Surgeon: Flip Michael MD;  Location:  MAIN OR;  Service: Urology    PEG W/TRACHEOSTOMY PLACEMENT N/A 09/08/2023    Procedure: TRACHEOSTOMY WITH INSERTION PEG TUBE;  Surgeon: Rudy Mcmanus MD;  Location: [facility] MAIN OR;  Service: General    TESTICLE SURGERY       Social History   Social History     Substance and Sexual Activity   Alcohol Use Not Currently    Comment: Former alcoholic. Last use in 2016     Social History     Substance and Sexual Activity   Drug Use Not Currently    Types: Marijuana, \"Crack\" cocaine    Comment: Current use medical marijuana. Not currently using crack cocaine.     E-Cigarette/Vaping    E-Cigarette Use Former User     Start Date 1/1/18     Quit Date 6/1/23      E-Cigarette/Vaping Substances    Nicotine Yes     THC Yes     CBD No     Flavoring No     Other No     Unknown No      Social History     Tobacco Use   Smoking Status Former    Current packs/day: 0.00    Average packs/day: 0.5 packs/day for 15.4 years (7.7 ttl pk-yrs)    Types: " "Cigarettes    Start date: 2008    Quit date: 2023    Years since quittin.0   Smokeless Tobacco Never     Family History:   Family History   Problem Relation Age of Onset    Coronary artery disease Maternal Aunt     Cancer Father     Diabetes Father     Hypertension Father        Review of previous medical records was completed.     Meds/Allergies   all current active meds have been reviewed    Allergies   Allergen Reactions    Dog Epithelium Cough, Sneezing and Nasal Congestion       Objective   Vitals:Blood pressure 110/73, pulse 80, temperature 98.5 °F (36.9 °C), temperature source Axillary, resp. rate 18, height 6' 4\" (1.93 m), weight 119 kg (262 lb 5.6 oz), SpO2 98%.,Body mass index is 31.93 kg/m².    Intake/Output Summary (Last 24 hours) at 2024 1428  Last data filed at 2024 0516  Gross per 24 hour   Intake 630 ml   Output 1050 ml   Net -420 ml       Invasive Devices:   Invasive Devices       Central Venous Catheter Line  Duration             Port A Cath Right -- days    Port A Cath Right Subclavian -- days              Peripheral Intravenous Line  Duration             Peripheral IV 24 Distal;Left;Upper;Ventral (anterior) Arm 2 days    Peripheral IV 24 Left Hand 2 days              Drain  Duration             Gastrostomy/Enterostomy Percutaneous Interventional Gastrostomy 18 Fr.  days    External Urinary Catheter Small 1 day              Airway  Duration             Surgical Airway Distal;Cuffed;Other (Comment) 1 day                    Physical Exam  Constitutional:       General: He is not in acute distress.     Appearance: He is ill-appearing (chronically). He is not toxic-appearing.      Comments: Supine in bed   HENT:      Head: Normocephalic and atraumatic.   Cardiovascular:      Rate and Rhythm: Normal rate.   Pulmonary:      Effort: No respiratory distress.      Comments: Trached, vent depepdent  Musculoskeletal:      Cervical back: Normal range of motion.   Skin:   "   Comments: L ear erythema and skin breakdown   Neurological:      Mental Status: He is alert and oriented to person, place, and time.      Coordination: Finger-nose-finger test: No ataxia, intention tremor on the right. Heel-to-shin test: No ataxia, slower on the left; performed with BLEs in external rotation due to weakness.       Neurologic Exam     Mental Status   Oriented to person, place, and time.   Level of consciousness: alert  Nonverbal in the setting of trach/vent dependent. Follows multistep commands. Maintains attention. Comprehension appears intact.     Cranial Nerves     CN V   Facial sensation intact.     CN XII   Fasciculations: absent  Tongue deviation: none  Dysconjugate gaze with L eye slightly exotropic. Patient reports binocular vertical diplopia which resolves when closing one eye. Horizontal gaze intact; however patient unable to perform upgaze and downgaze. Eyelid tone intact. Cheek puff intact. Tongue strength intact.     Motor Exam   Muscle bulk: decreased  Overall muscle tone: normal  No observed fasciculations.     RUE:    Deltoid 3-    Bicep 4+    Tricep 5    Supination - limited     5    Finger extension (decreased range) 5    Finger flexion 5 (Dupuytren's contracture      on R 5th digit)    LUE:    Deltoid 4-    Bicep 4+    Tricep 5     5    Supination - limited    Finger extension (decreased range) 5    Finger flexion 5 (Dupuytren's contracture      on R 5th digit)    RLE:     Iliopsoas 4+    Quadricep 5 (Limited extension-tight          hamstrings)    Hamstrings 5    Plantarflexion 5    Dorsiflexion 5 (Limited range)    LLE:    Iliopsoas 3-    Quadricep 4+ (Limited extension-tight        hamstrings)    Hamstrings 5    Plantarflexion 5    Dorsiflexion 5 (Limited range)     Sensory Exam   Light touch normal.   Temperature:    RUE: diminished distally    LUE: intact throughout    RLE: diminished distally    LLE: absent throughout  Proprioception: Diminished in bilateral great  toes     Gait, Coordination, and Reflexes     Coordination   Finger-nose-finger test: No ataxia, intention tremor on the right.  Heel-to-shin test: No ataxia, slower on the left; performed with BLEs in external rotation due to weakness.    Reflexes   Right plantar: normal  Left plantar: normal  Right Cornejo: absent  Left Cornejo: absent  Right ankle clonus: absent  Left ankle clonus: absent      Lab Results: I have personally reviewed pertinent reports.    Imaging Studies: I have personally reviewed pertinent films in PACS  EKG, Pathology, and Other Studies: I have personally reviewed pertinent reports.    VTE Prophylaxis: Eliquis    Code Status: Level 1 - Full Code

## 2024-06-03 ENCOUNTER — APPOINTMENT (INPATIENT)
Dept: RADIOLOGY | Facility: HOSPITAL | Age: 39
DRG: 720 | End: 2024-06-03
Payer: COMMERCIAL

## 2024-06-03 ENCOUNTER — APPOINTMENT (INPATIENT)
Dept: NEUROLOGY | Facility: HOSPITAL | Age: 39
DRG: 720 | End: 2024-06-03
Payer: COMMERCIAL

## 2024-06-03 PROBLEM — J47.9 BRONCHIECTASIS WITHOUT COMPLICATION (HCC): Status: RESOLVED | Noted: 2024-04-24 | Resolved: 2024-06-03

## 2024-06-03 PROBLEM — Z95.828 PORT-A-CATH IN PLACE: Chronic | Status: ACTIVE | Noted: 2023-03-08

## 2024-06-03 PROBLEM — Z93.0 STATUS POST TRACHEOSTOMY (HCC): Chronic | Status: ACTIVE | Noted: 2023-09-14

## 2024-06-03 PROBLEM — Z99.11 VENTILATOR DEPENDENT (HCC): Chronic | Status: ACTIVE | Noted: 2024-05-30

## 2024-06-03 PROBLEM — R39.11 URINARY HESITANCY: Status: RESOLVED | Noted: 2022-12-11 | Resolved: 2024-06-03

## 2024-06-03 PROBLEM — G70.9 NEUROMUSCULAR DISORDER (HCC): Chronic | Status: ACTIVE | Noted: 2024-05-30

## 2024-06-03 PROBLEM — Z74.09 IMPAIRED PHYSICAL MOBILITY: Chronic | Status: ACTIVE | Noted: 2023-09-24

## 2024-06-03 PROBLEM — G93.40 ENCEPHALOPATHY: Status: RESOLVED | Noted: 2023-04-20 | Resolved: 2024-06-03

## 2024-06-03 PROBLEM — E66.2 OBESITY HYPOVENTILATION SYNDROME (HCC): Chronic | Status: ACTIVE | Noted: 2023-10-24

## 2024-06-03 PROBLEM — Z93.1 S/P PERCUTANEOUS ENDOSCOPIC GASTROSTOMY (PEG) TUBE PLACEMENT (HCC): Chronic | Status: ACTIVE | Noted: 2023-09-12

## 2024-06-03 PROBLEM — R68.89 COPIOUS ORAL SECRETIONS: Status: RESOLVED | Noted: 2024-04-24 | Resolved: 2024-06-03

## 2024-06-03 LAB
ANION GAP SERPL CALCULATED.3IONS-SCNC: 7 MMOL/L (ref 4–13)
BASOPHILS # BLD AUTO: 0.07 THOUSANDS/ÂΜL (ref 0–0.1)
BASOPHILS NFR BLD AUTO: 1 % (ref 0–1)
BUN SERPL-MCNC: 8 MG/DL (ref 5–25)
CALCIUM SERPL-MCNC: 8.8 MG/DL (ref 8.4–10.2)
CHLORIDE SERPL-SCNC: 103 MMOL/L (ref 96–108)
CO2 SERPL-SCNC: 26 MMOL/L (ref 21–32)
CREAT SERPL-MCNC: 0.66 MG/DL (ref 0.6–1.3)
EOSINOPHIL # BLD AUTO: 0.11 THOUSAND/ÂΜL (ref 0–0.61)
EOSINOPHIL NFR BLD AUTO: 1 % (ref 0–6)
ERYTHROCYTE [DISTWIDTH] IN BLOOD BY AUTOMATED COUNT: 16 % (ref 11.6–15.1)
GFR SERPL CREATININE-BSD FRML MDRD: 123 ML/MIN/1.73SQ M
GLUCOSE SERPL-MCNC: 101 MG/DL (ref 65–140)
GLUCOSE SERPL-MCNC: 110 MG/DL (ref 65–140)
HCT VFR BLD AUTO: 35.1 % (ref 36.5–49.3)
HGB BLD-MCNC: 11.3 G/DL (ref 12–17)
IMM GRANULOCYTES # BLD AUTO: 0.09 THOUSAND/UL (ref 0–0.2)
IMM GRANULOCYTES NFR BLD AUTO: 1 % (ref 0–2)
LYMPHOCYTES # BLD AUTO: 1.41 THOUSANDS/ÂΜL (ref 0.6–4.47)
LYMPHOCYTES NFR BLD AUTO: 18 % (ref 14–44)
MAGNESIUM SERPL-MCNC: 2.2 MG/DL (ref 1.9–2.7)
MCH RBC QN AUTO: 29.3 PG (ref 26.8–34.3)
MCHC RBC AUTO-ENTMCNC: 32.2 G/DL (ref 31.4–37.4)
MCV RBC AUTO: 91 FL (ref 82–98)
MONOCYTES # BLD AUTO: 0.85 THOUSAND/ÂΜL (ref 0.17–1.22)
MONOCYTES NFR BLD AUTO: 11 % (ref 4–12)
NEUTROPHILS # BLD AUTO: 5.41 THOUSANDS/ÂΜL (ref 1.85–7.62)
NEUTS SEG NFR BLD AUTO: 68 % (ref 43–75)
NRBC BLD AUTO-RTO: 0 /100 WBCS
PHOSPHATE SERPL-MCNC: 3.7 MG/DL (ref 2.7–4.5)
PLATELET # BLD AUTO: 204 THOUSANDS/UL (ref 149–390)
PMV BLD AUTO: 9.3 FL (ref 8.9–12.7)
POTASSIUM SERPL-SCNC: 3.5 MMOL/L (ref 3.5–5.3)
RBC # BLD AUTO: 3.86 MILLION/UL (ref 3.88–5.62)
SODIUM SERPL-SCNC: 136 MMOL/L (ref 135–147)
WBC # BLD AUTO: 7.94 THOUSAND/UL (ref 4.31–10.16)

## 2024-06-03 PROCEDURE — 87081 CULTURE SCREEN ONLY: CPT

## 2024-06-03 PROCEDURE — 85025 COMPLETE CBC W/AUTO DIFF WBC: CPT

## 2024-06-03 PROCEDURE — 82948 REAGENT STRIP/BLOOD GLUCOSE: CPT

## 2024-06-03 PROCEDURE — 94669 MECHANICAL CHEST WALL OSCILL: CPT

## 2024-06-03 PROCEDURE — 80048 BASIC METABOLIC PNL TOTAL CA: CPT

## 2024-06-03 PROCEDURE — 99291 CRITICAL CARE FIRST HOUR: CPT | Performed by: NURSE PRACTITIONER

## 2024-06-03 PROCEDURE — 94150 VITAL CAPACITY TEST: CPT

## 2024-06-03 PROCEDURE — 95816 EEG AWAKE AND DROWSY: CPT

## 2024-06-03 PROCEDURE — 94760 N-INVAS EAR/PLS OXIMETRY 1: CPT

## 2024-06-03 PROCEDURE — 83735 ASSAY OF MAGNESIUM: CPT

## 2024-06-03 PROCEDURE — 71045 X-RAY EXAM CHEST 1 VIEW: CPT

## 2024-06-03 PROCEDURE — 99291 CRITICAL CARE FIRST HOUR: CPT | Performed by: INTERNAL MEDICINE

## 2024-06-03 PROCEDURE — 95816 EEG AWAKE AND DROWSY: CPT | Performed by: PSYCHIATRY & NEUROLOGY

## 2024-06-03 PROCEDURE — 84100 ASSAY OF PHOSPHORUS: CPT

## 2024-06-03 PROCEDURE — 94640 AIRWAY INHALATION TREATMENT: CPT

## 2024-06-03 PROCEDURE — 94003 VENT MGMT INPAT SUBQ DAY: CPT

## 2024-06-03 RX ORDER — POTASSIUM CHLORIDE 20MEQ/15ML
40 LIQUID (ML) ORAL ONCE
Status: COMPLETED | OUTPATIENT
Start: 2024-06-03 | End: 2024-06-03

## 2024-06-03 RX ADMIN — CHLORHEXIDINE GLUCONATE 15 ML: 1.2 RINSE ORAL at 08:11

## 2024-06-03 RX ADMIN — LEVALBUTEROL HYDROCHLORIDE 1.25 MG: 1.25 SOLUTION RESPIRATORY (INHALATION) at 07:43

## 2024-06-03 RX ADMIN — LEVALBUTEROL HYDROCHLORIDE 1.25 MG: 1.25 SOLUTION RESPIRATORY (INHALATION) at 13:12

## 2024-06-03 RX ADMIN — FLUOXETINE 10 MG: 10 CAPSULE ORAL at 08:26

## 2024-06-03 RX ADMIN — APIXABAN 5 MG: 5 TABLET, FILM COATED ORAL at 17:31

## 2024-06-03 RX ADMIN — GUAIFENESIN 200 MG: 200 SOLUTION ORAL at 15:27

## 2024-06-03 RX ADMIN — CEFEPIME 2000 MG: 2 INJECTION, POWDER, FOR SOLUTION INTRAVENOUS at 18:14

## 2024-06-03 RX ADMIN — APIXABAN 5 MG: 5 TABLET, FILM COATED ORAL at 08:11

## 2024-06-03 RX ADMIN — LEVALBUTEROL HYDROCHLORIDE 1.25 MG: 1.25 SOLUTION RESPIRATORY (INHALATION) at 02:01

## 2024-06-03 RX ADMIN — SODIUM CHLORIDE SOLN NEBU 3% 4 ML: 3 NEBU SOLN at 02:01

## 2024-06-03 RX ADMIN — POTASSIUM CHLORIDE 40 MEQ: 1.5 SOLUTION ORAL at 12:35

## 2024-06-03 RX ADMIN — SODIUM CHLORIDE SOLN NEBU 3% 4 ML: 3 NEBU SOLN at 07:43

## 2024-06-03 RX ADMIN — SODIUM CHLORIDE SOLN NEBU 3% 4 ML: 3 NEBU SOLN at 13:12

## 2024-06-03 RX ADMIN — GUAIFENESIN 200 MG: 200 SOLUTION ORAL at 08:29

## 2024-06-03 RX ADMIN — CHLORHEXIDINE GLUCONATE 15 ML: 1.2 RINSE ORAL at 21:57

## 2024-06-03 RX ADMIN — SODIUM CHLORIDE SOLN NEBU 3% 4 ML: 3 NEBU SOLN at 19:43

## 2024-06-03 RX ADMIN — GUAIFENESIN 200 MG: 200 SOLUTION ORAL at 04:43

## 2024-06-03 RX ADMIN — GUAIFENESIN 200 MG: 200 SOLUTION ORAL at 21:57

## 2024-06-03 RX ADMIN — CEFTRIAXONE 2000 MG: 2 INJECTION, POWDER, FOR SOLUTION INTRAMUSCULAR; INTRAVENOUS at 08:26

## 2024-06-03 RX ADMIN — LEVALBUTEROL HYDROCHLORIDE 1.25 MG: 1.25 SOLUTION RESPIRATORY (INHALATION) at 19:43

## 2024-06-03 NOTE — PHYSICAL THERAPY NOTE
PHYSICAL THERAPY NOTE    Patient Name: Hemanth Swanson  Today's Date: 6/3/2024     06/03/24 1212   Note Type   Note type Cancelled Session   Cancel Reasons Medical status   Additional Comments Chart reviewed and Pt discussed in ICU rounds. Via  OT report--Pt not medically appropriate for IPPT today (bradycardia and hypoxia). Will cancel and follow      Berny Garcia, PT

## 2024-06-03 NOTE — PROGRESS NOTES
[facility]  Progress Note  Name: Hemanth Swanson I  MRN: [id number]  Unit/Bed#: ICU 12 I Date of Admission: 5/30/2024   Date of Service: 6/3/2024 I Hospital Day: 4    Assessment & Plan   Seizure-like activity (HCC)  Assessment & Plan  2x seizure activity, went bradycardic and hypoxic, unsure if seizure activity causes bradycardia/hypoxia or hypoxia/bradycardia causes seizure activity  05/31-06/01 overnight, pulseless asystole without hypoxia, s/p 2 x of epi, bicarb, and calcium, PEA on monitor, ROSC achieved with clearance of secretions, followed byseizure like activity, ativan 2mg given, seizure activity aborted  06/02-brooke to 35, hypoxic to 50%, suctioned, noted to have seizure-like activity, 2mg Ativan given     Plan:  Neurology consulted for concerns for seizure activity  Recommended EEG and Repeat Brain MRI  Ativan 2mg q8h prn for 1 generalized tonic clonic seizure or 2 complex partial seizures in less than 3 hours.  May repeat x 1. MAX of 2 doses in 24 hours   Seizure precautions    Bradycardia  Assessment & Plan  Concern for increased vagal stimulation resulting hypoxic bradycardia 2/2 to increased secretions and position of trach with mucus plugging  Patient had 4 episodes of bradycardia into 20-30s due to increased trach secretions with mucus plugging, worsening depended on trach position, thought to stimulate vagal bradycardia. Of note on 5/31, 1x episode resulted in  pulseless asystole without hypoxia, s/p 2 x of epi, bicarb, and calcium, PEA on monitor, ROSC achieved with clearance of secretions.   S/p tracheostomy in 9/2023 at [facility], initially 8.0 cuffed,downsized to a Shiley 4 then capping trial.  Several days into the capping trial he developed aspiration pneumonia and ended up being hospitalized at [facility] with a upsized tracheostomy to 8.0, he had a bronchoscopy performed and required full ventilator support at that time.  Trach exchanged to 6.0 cuffed  on 3/18/2024  Home Vent A/ VC withTV 550, RR 24, MV 12-14 L, 100% spontaneous triggers  S/p Bronch on 06/01, exchanged Trach for 6XL during bronch, no further bradycardic events occurred throughout the day  06/02-brooke to 35, hypoxic to 50%, suctioned, noted to have seizure-like activity, 2mg Ativan given     Plan:  Neurology consulted for concerns for seizure activity  Recommended EEG and repeat brain MRI  Ativan 2mg q8h prn for 1 generalized tonic clonic seizure or 2 complex partial seizures in less than 3 hours.  May repeat x 1. 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  Several days into the capping trial he developed aspiration pneumonia and ended up being hospitalized at [facility] with a upsized tracheostomy to 8.0, he had a bronchoscopy performed and required full ventilator support at that time.  Trach exchanged to 6.0 cuffed on 3/18/2024  Ventilator dependent 24/7  Continue home vent settings: 18/500/40%/6      Impaired physical mobility  Assessment & Plan  -PT/OT for eval  -Case management for placement    Status post tracheostomy ([facility])  Assessment & Plan  S/p tracheostomy in 9/2023 at [facility] initially 8.0 cuffed,downsized to a Shiley 4 then capping trial.  Several days into the capping trial he developed aspiration  pneumonia and ended up being hospitalized at [facility] with a upsized tracheostomy to 8.0, he had a bronchoscopy performed and required full ventilator support at that time.  Trach exchanged to 6.0 cuffed on 3/18/2024  Home Vent A/ VC withTV 550, RR 24, MV 12-14 L, 100% spontaneous triggers  Concern for increased vagal stimulation resulting hypoxic bradycardia 2/2 to increased secretions and position of trach with mucus plugging  S/p Bronch on 06/01, exchanged Trach for 6XL during bronch    Plan:  Monitor for s/s of infection, currently has increased secretions, likely 2/2 to PNA    S/P percutaneous endoscopic gastrostomy (PEG) tube placement ([facility])  Assessment & Plan  -Monitor for s/s infection      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[facility])  Assessment & Plan  Testicular cancer s/p left orchiectomy on 12/2022  Repeat CT scan in January 2023 with enlarging lymph node.  Began Chemo in March 2023: Etoposide and Cisplatin with plan for 4 cycles, 5 days every 21 days  Did not complete chemo treatment due to adverse effects  After 3rd cycle reported double vision, labile affect and right sided weakness and therefore chemotherapy was stopped on 5/26/23  Outpatient follow up with Heme/Onc     * Sepsis ([facility])  Assessment & Plan  Presented from PCP with hypoxia with desat in 70s and increased WOB, admitted for, Multilobar PNA on dependent vent   Sepsis Criteria meet: due to leukocytosis and tachypnea.  Suspect the source of pneumonia  CT PE chest: multilobar PNA, most severe in [place] MAN[place], aspiration not excluded, bilateral atelectasis, NO PE  Started on Zoysn abx and deescalated to Ceftriaxone on 5/31  Blood cultures NGTD     Recent Labs     06/01/24  0532   PROCALCITONI 0.08       Recent Labs     06/01/24  0532 06/02/24  0454 06/03/24  0446   WBC 12.35* 7.91 7.94       Continue Ceftriaxone qd, day 4/7  F/u Sputum cultures  F/u Blood cultures  F/u Strep/Legionella Urine   Continue Xopenex, Hypertonic Nebs  Continue Mucinex               Disposition: Critical care    ICU Core Measures     Vented Patient  VAP Bundle  VAP bundle ordered     A: Assess, Prevent, and Manage Pain Has pain been assessed? Yes  Need for changes to pain regimen? No   B: Both Spontaneous Awakening Trials (SATs) and Spontaneous Breathing Trials (SBTs) Plan to perform spontaneous awakening trial today? No secondary to Patient chronically on vent 2/2 unspecified NMD  Plan to perform spontaneous breathing trial today? Chronic vent   Obvious barriers to extubation? NA   C: Choice of Sedation RASS Goal: 0 Alert and Calm  Need for changes to sedation or analgesia regimen? No   D: Delirium CAM-ICU: Negative   E: Early Mobility  Plan for early mobility? No   F: Family Engagement Plan for family engagement today? Yes       Antibiotic Review: Patient on appropriate coverage based on culture data.     Review of Invasive Devices:      Central access plan:  Has a Subclavian cath      Prophylaxis:  VTE VTE covered by:  apixaban, Per PEG Tube, 5 mg at 06/02/24 1732       Stress Ulcer  not ordered         Significant 24hr Events     24hr events: multiple episodes of bradycardia and hypoxia, seemingly stimulated by suctioning or position change thought to be caused by vagal hyperstimulation     Subjective   Review of Systems: See HPI for Review of Systems     Objective                            Vitals I/O      Most Recent Min/Max in 24hrs   Temp 98.8 °F (37.1 °C) Temp  Min: 97.6 °F (36.4 °C)  Max: 99 °F (37.2 °C)   Pulse 79 Pulse  Min: 47  Max: 83   Resp 18 Resp  Min: 18  Max: 41   /80 BP  Min: 89/54  Max: 125/84   O2 Sat 100 % SpO2  Min: 52 %  Max: 100 %      Intake/Output Summary (Last 24 hours) at 6/3/2024 0811  Last data filed at 6/3/2024 0657  Gross per 24 hour   Intake 60 ml   Output 800 ml   Net -740 ml       Diet Enteral/Parenteral; Tube Feeding No Oral Diet; Jevity 1.5; Continuous; 70; 200; Water; Every 4 hours    Invasive Monitoring           Physical Exam   Physical  Exam  Vitals and nursing note reviewed.   Eyes:      Extraocular Movements: Extraocular movements intact.      Conjunctiva/sclera: Conjunctivae normal.      Pupils: Pupils are equal, round, and reactive to light.   Skin:     General: Skin is warm and dry.   HENT:      Head: Normocephalic and atraumatic.      Right Ear: Hearing normal.      Left Ear: Hearing normal.      Nose: No nasal deformity or congestion.   Neck:      Trachea: Tracheostomy present.   Cardiovascular:      Pulses: Normal pulses.      Comments: Unable to assess due to vibration treatment ongoing at the time of examination  Musculoskeletal:      Comments: Patient has good  strength, and can lift arms spontaneously.   Abdominal:      Palpations: Abdomen is soft.      Tenderness: There is no abdominal tenderness.   Constitutional:       General: He is not in acute distress.     Appearance: He is well-developed.   Pulmonary:      Breath sounds: Rhonchi present.      Comments: On ventilator  Neurological:      Mental Status: He is alert. Mental status is at baseline. He is calm.      Cranial Nerves: No facial asymmetry.      Sensory: No sensory deficit.            Diagnostic Studies      EKG: Last on 5/31/24  Imaging: Last on 5/31/24 I have personally reviewed pertinent reports.       Medications:  Scheduled PRN   apixaban, 5 mg, BID  cefTRIAXone, 2,000 mg, Q24H  chlorhexidine, 15 mL, Q12H ISAEL  FLUoxetine, 10 mg, Daily  guaiFENesin, 200 mg, Q6H  levalbuterol, 1.25 mg, Q6H  lidocaine (PF), 10 mL, Once  sodium chloride, 4 mL, Q6H      atropine, 0.5 mg, Once PRN  LORazepam, 2 mg, Q8H PRN       Continuous          Labs:    CBC    Recent Labs     06/02/24  0454 06/03/24  0446   WBC 7.91 7.94   HGB 11.7* 11.3*   HCT 36.2* 35.1*    204     BMP    Recent Labs     06/02/24  0454 06/03/24  0446   SODIUM 137 136   K 3.7 3.5    103   CO2 27 26   AGAP 8 7   BUN 6 8   CREATININE 0.68 0.66   CALCIUM 9.0 8.8       Coags    No recent results      Additional Electrolytes  Recent Labs     06/02/24 0454 06/03/24  0446   MG 2.2 2.2   PHOS 3.6 3.7   CAIONIZED 1.17  --           Blood Gas    No recent results  No recent results LFTs  No recent results    Infectious  No recent results  Glucose  Recent Labs     06/02/24 0454 06/03/24  0446   GLUC 84 101               Rachel Sharma MD

## 2024-06-03 NOTE — ASSESSMENT & PLAN NOTE
Hx of Testicular CA, S/P 3 cycles CDDP/ ,  then developed neuromuscular syndrome including urinary incontinence and resp failure resulting in vent-dependent, has not established with Neuro  History of anoxic brain injury and unknown neuromuscular disorder  S/p tracheostomy in 9/2023 at [facility], initially 8.0 cuffed,downsized to a Shiley 4 then capping trial.  Several days into the capping trial he developed aspiration pneumonia and ended up being hospitalized at [facility] with a upsized tracheostomy to 8.0, he had a bronchoscopy performed and required full ventilator support at that time.  Trach exchanged to 6.0 cuffed on 3/18/2024  Ventilator dependent 24/7  Continue home vent settings: 18/500/40%/6

## 2024-06-03 NOTE — ASSESSMENT & PLAN NOTE
2x seizure activity, went bradycardic and hypoxic, unsure if seizure activity causes bradycardia/hypoxia or hypoxia/bradycardia causes seizure activity  05/31-06/01 overnight, pulseless asystole without hypoxia, s/p 2 x of epi, bicarb, and calcium, PEA on monitor, ROSC achieved with clearance of secretions, followed byseizure like activity, ativan 2mg given, seizure activity aborted  06/02-brooke to 35, hypoxic to 50%, suctioned, noted to have seizure-like activity, 2mg Ativan given     Plan:  Neurology consulted for concerns for seizure activity  Recommended EEG and Repeat Brain MRI  Ativan 2mg q8h prn for 1 generalized tonic clonic seizure or 2 complex partial seizures in less than 3 hours.  May repeat x 1. MAX of 2 doses in 24 hours   Seizure precautions

## 2024-06-03 NOTE — ASSESSMENT & PLAN NOTE
S/p tracheostomy in 9/2023 at [facility], initially 8.0 cuffed,downsized to a Shiley 4 then capping trial.  Several days into the capping trial he developed aspiration pneumonia and ended up being hospitalized at [facility] with a upsized tracheostomy to 8.0, he had a bronchoscopy performed and required full ventilator support at that time.  Trach exchanged to 6.0 cuffed on 3/18/2024  Home Vent A/ VC withTV 550, RR 24, MV 12-14 L, 100% spontaneous triggers  Concern for increased vagal stimulation resulting hypoxic bradycardia 2/2 to increased secretions and position of trach with mucus plugging  S/p Bronch on 06/01, exchanged Trach for 6XL during bronch  Many hypoxic events yesterday, no events overnight    Plan:  Monitor for s/s of infection, currently has increased secretions, likely 2/2 to PNA  Plan for bronchoscopy if hypoxic episodes resume

## 2024-06-03 NOTE — RESPIRATORY THERAPY NOTE
Called to bedside for brooke/desaturation event. Arrived and patient was being bagged by AP. Per Ap/RN patient desaturated and had HR in the 30s, patient was also getting 40 of TV prior to being bagged. Once recovered patient was placed back on the vent on documented settings. Spo2 returned back to 100% on 40%.     Resident called attending bedside said if trach was positional can be changed back to 6 shiley from 6XLT. However, do not feel as if trach is positional and that the XLT has helped the patient more. Spoke to AP about this and agreed trach was not the issue. Per attending increase frequency of suctioning and lavage patient as needed.

## 2024-06-03 NOTE — SPEECH THERAPY NOTE
Speech Language/Pathology    S/w RN who reports that patient is not yet stable/medically appropriate for ST evaluation at this time. ST to f/u as able for evaluation when medically/clinically appropriate.

## 2024-06-03 NOTE — ASSESSMENT & PLAN NOTE
Concern for increased vagal stimulation resulting hypoxic bradycardia 2/2 to increased secretions and position of trach with mucus plugging  Patient had 4 episodes of bradycardia into 20-30s due to increased trach secretions with mucus plugging, worsening depended on trach position, thought to stimulate vagal bradycardia. Of note on 5/31, 1x episode resulted in  pulseless asystole without hypoxia, s/p 2 x of epi, bicarb, and calcium, PEA on monitor, ROSC achieved with clearance of secretions.   S/p tracheostomy in 9/2023 at [facility], initially 8.0 cuffed,downsized to a Shiley 4 then capping trial.  Several days into the capping trial he developed aspiration pneumonia and ended up being hospitalized at [facility] with a upsized tracheostomy to 8.0, he had a bronchoscopy performed and required full ventilator support at that time.  Trach exchanged to 6.0 cuffed on 3/18/2024  Home Vent A/ VC withTV 550, RR 24, MV 12-14 L, 100% spontaneous triggers  S/p Bronch on 06/01, exchanged Trach for 6XL during bronch, no further bradycardic events occurred throughout the day  06/02-brooke to 35, hypoxic to 50%, suctioned, noted to have seizure-like activity, 2mg Ativan given     Plan:  Neurology consulted for concerns for seizure activity  Recommended EEG and repeat brain MRI  Ativan 2mg q8h prn for 1 generalized tonic clonic seizure or 2 complex partial seizures in less than 3 hours.  May repeat x 1. MAX of 2 doses in 24 hours

## 2024-06-03 NOTE — OCCUPATIONAL THERAPY NOTE
Occupational Therapy Cancellation Note     Patient Name: Hemanth Swanson  Today's Date: 6/3/2024     06/03/24 1015   Note Type   Note type Cancelled Session   Cancel Reasons Medical status   Additional Comments OT consult received and chart reviewed. Per ICU mobility rounds, patient is not appropriate for OT evaluation at this time. Ventiliator dependent at baseline, has been hypoxic and bradycardic per RN. Will hold and f/u as able and appropriate.     Merced Forte MS OTR/L   [place] Licensure# [id number]

## 2024-06-03 NOTE — ASSESSMENT & PLAN NOTE
Presented from PCP with hypoxia with desat in 70s and increased WOB, admitted for, Multilobar PNA on dependent vent   Sepsis Criteria meet: due to leukocytosis and tachypnea.  Suspect the source of pneumonia  CT PE chest: multilobar PNA, most severe in [place], aspiration not excluded, bilateral atelectasis, NO PE  Started on Zoysn abx and deescalated to Ceftriaxone on 5/31  Blood cultures NGTD     Recent Labs     06/01/24  0532   PROCALCITONI 0.08       Recent Labs     06/01/24  0532 06/02/24  0454 06/03/24  0446   WBC 12.35* 7.91 7.94       Continue Ceftriaxone qd, day 4/7  F/u Sputum cultures  F/u Blood cultures  F/u Strep/Legionella Urine   Continue Xopenex, Hypertonic Nebs  Continue Mucinex

## 2024-06-03 NOTE — ASSESSMENT & PLAN NOTE
Hx of Testicular CA, S/P 3 cycles CDDP/ ,  then developed neuromuscular syndrome including urinary incontinence and resp failure resulting in vent-dependent, has not established with Neuro  History of anoxic brain injury and unknown neuromuscular disorder  S/p tracheostomy in 9/2023 at [facility], initially 8.0 cuffed,downsized to a Shiley 4 then capping trial.  Several days into the capping trial he developed aspiration pneumonia and ended up being hospitalized at [facility] with a upsized tracheostomy to 8.0, he had a bronchoscopy performed and required full ventilator support at that time.  Trach exchanged to 6.0 cuffed on 3/18/2024  Ventilator dependent 24/7  Continue home vent settings: 18/500/40%/6

## 2024-06-03 NOTE — PROGRESS NOTES
[facility]  Neurology progress Note - Name: Hemanth Swanson I  MRN: [id number] Unit/Bed#: ICU 12 I   Date of Admission: 5/30/2024  Date of Service: 6/3/2024 I Hospital Day: 4    Assessment & Plan   Seizure-like activity (HCC)  Assessment & Plan  39 y.o. male with vent dependent trach with a previous history of PE on Eliquis referred to the ED by outpatient family medicine due to intermittent hypoxia to the 70s and high concern forpneumonia. Patient diagnosed with sepsis due to multifocal PNA and is currently being treated with ceftriaxone. WBC improving from 17-->7.9. Between 5/31 and 6/2, patient has had 4 events of bradycardia +/- hypoxia with the last two events concerning for seizure like activity as detailed below in HPI.  Both on yesterday's and today neuro exam, and through near continue observation by the critical care staff for epileptic events.   An EEG done today is within normal limits.  In regards to seizure like activity, we have a higher suspicion for hypoperfusion/cardiogenic syncope in the setting of bradycardia +/- hypoxia. No underlying pathology that is known at this time that would make the patient more predisposed to seizures; however reasonable to repeat MRI brain w wo as the last one was completed in 11/2023 at [facility] and was motion degraded.  Will also include IAC for reevaluation of prior bilateral IAC enhancement.  Plan:  ENS2 sent to [facility] (which includes Ma2/Ta - ab most associated with testicular cancer; [place] panel did not include this)  Pending repeat MRI brain IAC w wout    Neuromuscular disorder (HCC)  Assessment & Plan  6/3/2024: New to this neuro team today seen in follow-up after initial neuro consult done yesterday is this 39 y.o. male with vent dependent trach and peg (9/2023), who has an undiagnosed neuromuscular syndrome, despite both inpatient and outpatient workups.  Question also whether or not he may have sustained an anoxic brain injury (not  evident clinically nor on prior MRI).  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  He also has a history of , h/o PE on Eliquis, depression and anxiety.  He has been seen in our outpatient neurology clinics and also been seen by [facility] neurology.   This patient was recently referred to the ED by outpatient family medicine due to intermittent hypoxia to the 70s and high concern forpneumonia. Patient diagnosed with sepsis due to multifocal PNA and is currently being treated with ceftriaxone. WBC improving from 17-->7.9. Between 5/31 and 6/2, patient has had 4 events of bradycardia +/- hypoxia with the last two events concerning for seizure like activity as detailed below in HPI, see the note above.  Concerning the neuromuscular abnormalities today his neuro exam was consistent with that done previously and detailed below with patient alert and oriented, following multistep commands, proximal>distal weakness, asymmetric neuropathy.   After extensive record review of our colleagues notes from this admission as well as previous inpatient and outpatient visits and workup, see the HPI for details, his exam consistent with large fiber neuropathy, and possible myopathy vs deconditioning. Additional work-up warranted as patient is scheduled for outpatient neuromuscular evaluation.   In regards to recent events with concern for seizure like activity, higher suspicion for hypoperfusion/cardiogenic syncope in the setting of bradycardia +/- hypoxia. No underlying pathology that is known at this time that would make the patient more predisposed to seizures; however reasonable to repeat MRI brain w wo as the last one was completed in 11/2023 at [facility] and was motion degraded.  Will  "also include IAC for reevaluation of prior bilateral IAC enhancement.    Plan:  Routine EEG pending  ENS2 sent to [facility] (which includes Ma2/Ta - ab most associated with testicular cancer; [place] panel did not include this)  Obtain repeat MRI brain IAC w wo      Patient is scheduled with outpatient Neuromuscular Dr. Dc on 7/17/24.           As noted above this patient has an outpatient neuromuscular appointment mid July in our office.  Other final discharge recommendations are pending his MRI to complete his workup.  No antiepileptics to be started at this time.  He should have a myasthenia EMG done as an outpatient if possible.    Subjective/Objective     Subjective: The patient is unable to offer a verbal assessment or complaint    ROS: With the head mallet he reports that he is comfortable when questioned specifically.  With yes/no questions thumbs up thumbs down or hand squeezes for yes and knows he denied other complaints.  He overall reports that he feels he is about the same compared with yesterday's exam.    Current Facility-Administered Medications   Medication Dose Route Frequency    apixaban  5 mg Per PEG Tube BID    atropine  0.5 mg Intravenous Once PRN    cefTRIAXone  2,000 mg Intravenous Q24H    chlorhexidine  15 mL Mouth/Throat Q12H ISAEL    FLUoxetine  10 mg Per G Tube Daily    guaiFENesin  200 mg Oral Q6H    levalbuterol  1.25 mg Nebulization Q6H    LORazepam  2 mg Intravenous Q8H PRN    sodium chloride  4 mL Nebulization Q6H       atropine    LORazepam    Vitals: Blood pressure 125/82, pulse 75, temperature 99.2 °F (37.3 °C), temperature source Oral, resp. rate 18, height 6' 4\" (1.93 m), weight 113 kg (250 lb 3.6 oz), SpO2 98%.,Body mass index is 30.46 kg/m².        Physical Exam:     Pat seen in: In bed in the ICU, there is no family present.  General appearance: alert, obese man, watching TV, there is no family present.  He is on the ventilator.  Neck, Lungs, Heart, & abdomen: WNL  Extremities: " atraumatic, no cyanosis or edema    Neurologic:   Mental status: Alert, he immediately was able to barely head nod and blink to us and social greeting.  He was able to follow commands and participate in the conversation with yes knows through hand squeezes and thumbs up or thumbs down.  He was grossly oriented, they have gating aliases and incorrect information on testing.   CN: exam EOM's limited downgaze, Gaze conjugate. Non lateralizing sensory & motor exam, (PP not tested on face). Reminder CNVIII-XII normal.   Motor: Again on exam today he had poor power proximally both upper and lower extremities.  He was able to demonstrate distal strength both upper and lower extremities with handgrips and dorsi and plantarflexion.  He was noted to have limited knee flexion and extension of bilaterally as well.  Sensory: grossly intact  X 4 limbs, PP not tested on today's exam  Cerebellar: no cerebellar testing appropriate or available given his condition  Gait: He is not ambulatory        Lab Results: I have personally reviewed pertinent reports.  , CBC:   Results from last 7 days   Lab Units 06/03/24  0446 06/02/24  0454 06/01/24  0532   WBC Thousand/uL 7.94 7.91 12.35*   RBC Million/uL 3.86* 3.98 3.94   HEMOGLOBIN g/dL 11.3* 11.7* 11.5*   HEMATOCRIT % 35.1* 36.2* 35.5*   MCV fL 91 91 90   PLATELETS Thousands/uL 204 220 239   , BMP/CMP:   Results from last 7 days   Lab Units 06/03/24  0446 06/02/24  0454 06/01/24  0532 05/31/24  2050 05/31/24  0533 05/30/24  1714   SODIUM mmol/L 136 137 137  --    < > 132*   POTASSIUM mmol/L 3.5 3.7 3.4*  --    < > 5.0   CHLORIDE mmol/L 103 102 101  --    < > 95*   CO2 mmol/L 26 27 29  --    < > 26   CO2, I-STAT mmol/L  --   --   --  30  --   --    BUN mg/dL 8 6 6  --    < > 7   CREATININE mg/dL 0.66 0.68 0.58*  --    < > 0.65   GLUCOSE, ISTAT mg/dl  --   --   --  184*  --   --    CALCIUM mg/dL 8.8 9.0 9.2  --    < > 9.2   AST U/L  --   --   --   --   --  27   ALT U/L  --   --   --   --    --  22   ALK PHOS U/L  --   --   --   --   --  128*   EGFR ml/min/1.73sq m 123 122 130  --    < > 124    < > = values in this interval not displayed.   , Vitamin B12:   , HgBA1C:   , TSH:   , Coagulation:   Results from last 7 days   Lab Units 05/31/24 2118   INR  1.34*          Imaging Studies: I have personally reviewed pertinent films in PACS and his MRI to be done here possibly tonight or early tomorrow brain with and without as well as IACs is pending.    EEG, Echo, Pathology, and Other Studies:  EEG done earlier today, routine study, is within normal limits.      Counseling / Coordination of Care  Total Critical Care time spent 30 minutes excluding procedures, teaching and family updates.

## 2024-06-03 NOTE — RESPIRATORY THERAPY NOTE
RT Ventilator Management Note  Hemanth Swanson 39 y.o. male MRN: [id number]  Unit/Bed#: ICU 12 Encounter: [id number]      Daily Screen         6/2/2024  0802 6/3/2024  0744          Patient safety screen outcome:: Failed Failed      Not Ready for Weaning due to:: Underline problem not resolved Underline problem not resolved                Physical Exam:   Assessment Type: During-treatment  General Appearance: Alert, Awake  Respiratory Pattern: Assisted  Chest Assessment: Chest expansion symmetrical  Bilateral Breath Sounds: Diminished, Rhonchi  O2 Device: vent      Resp Comments: Patient had a desaturation event to the 70s during suctioning. Placed patient on 100% and patient came back up slowly.     06/03/24 0744   Respiratory Assessment   Assessment Type During-treatment   General Appearance Alert;Awake   Respiratory Pattern Assisted   Chest Assessment Chest expansion symmetrical   Bilateral Breath Sounds Diminished;Rhonchi   Resp Comments Patient had a desaturation event to the 70s during suctioning. Placed patient on 100% and patient came back up slowly.   O2 Device vent   Vent Information   Vent    Vent type Hook G5   Hook Vent Mode (S)CMV   $ Vital Capacity Mech/Peak Flow Yes   $ Pulse Oximetry Spot Check Charge Completed   SpO2 100 %   (S)CMV Settings   Resp Rate (BPM) 18 BPM   VT (mL) 500 mL   FIO2 (%) 40 %   PEEP (cmH2O) 6 cmH2O   I:E Ratio 1:2.5   Insp Time (%) 0.9 %   Flow Trigger (LPM) 3   Humidification Heater   Heater Temperature (Set) 98.8 °F (37.1 °C)   (S)CMV Actuals   Resp Rate (BPM) 19 BPM   VT (mL) 516   MV 9.8   MAP (cmH2O) 13 cmH2O   Peak Pressure (cmH2O) 35 cmH2O   I:E Ratio (Obs) 1:2.5   Insp Resistance 30   Heater Temperature (Obs) 98.6 °F (37 °C)   Static Compliance (mL/cmH20) 42.8 mL/cmH2O   Plateau Pressure (cm H2O) 19.9 cm H2O   (S)CMV Alarms   High Peak Pressure (cmH2O) 50   Low Pressure (cmH2O) 8 cm H2O   High Resp Rate (BPM) 40 BPM   Low Resp Rate (BPM) 8 BPM   High MV  "(L/min) 16 L/min   Low MV (L/min) 5 L/min   High VT (mL) 800 mL   Low VT (mL) 250 mL   Apnea Time (s) 20 S   Maintenance   Alarm (pink) cable attached Yes   Resuscitation bag with peep valve at bedside Yes   Water bag changed No   Circuit changed No   Daily Screen   Patient safety screen outcome: Failed   Not Ready for Weaning due to: Underline problem not resolved   Adult IBW/VT Calculations   Height 6' 4\" (1.93 m)   IBW (Ideal Body Weight) 86.8 kg   Range VT 6 ML/Kg 520.8 mL/kg   Range VT 8 ML/Kg 694.4 mL/kg   Surgical Airway Distal;Cuffed;Other (Comment)   Placement Date/Time: 06/01/24 1155   Tube Size: 6  Placed By: Physician  Placed by (Name): Dr. Ronquillo  Type: Tracheostomy  Style: (c) Distal;Cuffed;Other (Comment)   Status Cuff Inflated   Site Assessment Clean;Dry   Site Care Cleansed;Dried;Open to air   Inner Cannula Care Changed/new   Ties Assessment Clean;Dry;Intact   Surgical Airway Cuff Pressure (color) Green   Equipment at bedside BVM;Wall Suction setup;Additional complete same size trach tube;Obturator;Sterile saline;Additional inner cannula       "

## 2024-06-03 NOTE — ASSESSMENT & PLAN NOTE
S/p tracheostomy in 9/2023 at [facility], initially 8.0 cuffed,downsized to a Shiley 4 then capping trial.  Several days into the capping trial he developed aspiration pneumonia and ended up being hospitalized at [facility] with a upsized tracheostomy to 8.0, he had a bronchoscopy performed and required full ventilator support at that time.  Trach exchanged to 6.0 cuffed on 3/18/2024  Home Vent A/ VC withTV 550, RR 24, MV 12-14 L, 100% spontaneous triggers  Concern for increased vagal stimulation resulting hypoxic bradycardia 2/2 to increased secretions and position of trach with mucus plugging  S/p Bronch on 06/01, exchanged Trach for 6XL during bronch    Plan:  Monitor for s/s of infection, currently has increased secretions, likely 2/2 to PNA

## 2024-06-04 ENCOUNTER — APPOINTMENT (INPATIENT)
Dept: GASTROENTEROLOGY | Facility: HOSPITAL | Age: 39
DRG: 720 | End: 2024-06-04
Payer: COMMERCIAL

## 2024-06-04 ENCOUNTER — APPOINTMENT (INPATIENT)
Dept: MRI IMAGING | Facility: HOSPITAL | Age: 39
DRG: 720 | End: 2024-06-04
Payer: COMMERCIAL

## 2024-06-04 ENCOUNTER — APPOINTMENT (INPATIENT)
Dept: RADIOLOGY | Facility: HOSPITAL | Age: 39
DRG: 720 | End: 2024-06-04
Payer: COMMERCIAL

## 2024-06-04 LAB
ANION GAP SERPL CALCULATED.3IONS-SCNC: 7 MMOL/L (ref 4–13)
ARTERIAL PATENCY WRIST A: YES
BACTERIA BLD CULT: NORMAL
BACTERIA BLD CULT: NORMAL
BACTERIA BRONCH AEROBE CULT: ABNORMAL
BASE EXCESS BLDA CALC-SCNC: -1.3 MMOL/L
BASOPHILS # BLD AUTO: 0.07 THOUSANDS/ÂΜL (ref 0–0.1)
BASOPHILS NFR BLD AUTO: 1 % (ref 0–1)
BUN SERPL-MCNC: 11 MG/DL (ref 5–25)
CALCIUM SERPL-MCNC: 9.1 MG/DL (ref 8.4–10.2)
CHLORIDE SERPL-SCNC: 102 MMOL/L (ref 96–108)
CO2 SERPL-SCNC: 27 MMOL/L (ref 21–32)
CREAT SERPL-MCNC: 0.57 MG/DL (ref 0.6–1.3)
EOSINOPHIL # BLD AUTO: 0.1 THOUSAND/ÂΜL (ref 0–0.61)
EOSINOPHIL NFR BLD AUTO: 1 % (ref 0–6)
ERYTHROCYTE [DISTWIDTH] IN BLOOD BY AUTOMATED COUNT: 16.1 % (ref 11.6–15.1)
GFR SERPL CREATININE-BSD FRML MDRD: 131 ML/MIN/1.73SQ M
GLUCOSE SERPL-MCNC: 105 MG/DL (ref 65–140)
GRAM STN SPEC: ABNORMAL
GRAM STN SPEC: ABNORMAL
HCO3 BLDA-SCNC: 23 MMOL/L (ref 22–28)
HCT VFR BLD AUTO: 36.9 % (ref 36.5–49.3)
HGB BLD-MCNC: 12 G/DL (ref 12–17)
HOROWITZ INDEX BLDA+IHG-RTO: 40 MM[HG]
IMM GRANULOCYTES # BLD AUTO: 0.08 THOUSAND/UL (ref 0–0.2)
IMM GRANULOCYTES NFR BLD AUTO: 1 % (ref 0–2)
LYMPHOCYTES # BLD AUTO: 1.24 THOUSANDS/ÂΜL (ref 0.6–4.47)
LYMPHOCYTES NFR BLD AUTO: 15 % (ref 14–44)
MCH RBC QN AUTO: 29.8 PG (ref 26.8–34.3)
MCHC RBC AUTO-ENTMCNC: 32.5 G/DL (ref 31.4–37.4)
MCV RBC AUTO: 92 FL (ref 82–98)
MONOCYTES # BLD AUTO: 0.73 THOUSAND/ÂΜL (ref 0.17–1.22)
MONOCYTES NFR BLD AUTO: 9 % (ref 4–12)
MRSA NOSE QL CULT: NORMAL
NEUTROPHILS # BLD AUTO: 5.96 THOUSANDS/ÂΜL (ref 1.85–7.62)
NEUTS SEG NFR BLD AUTO: 73 % (ref 43–75)
NRBC BLD AUTO-RTO: 0 /100 WBCS
O2 CT BLDA-SCNC: 18 ML/DL (ref 16–23)
OXYHGB MFR BLDA: 89.9 % (ref 94–97)
PCO2 BLDA: 37.7 MM HG (ref 36–44)
PEEP RESPIRATORY: 6 CM[H2O]
PH BLDA: 7.4 [PH] (ref 7.35–7.45)
PLATELET # BLD AUTO: 221 THOUSANDS/UL (ref 149–390)
PMV BLD AUTO: 9.6 FL (ref 8.9–12.7)
PO2 BLDA: 67 MM HG (ref 75–129)
POTASSIUM SERPL-SCNC: 4 MMOL/L (ref 3.5–5.3)
RBC # BLD AUTO: 4.03 MILLION/UL (ref 3.88–5.62)
SODIUM SERPL-SCNC: 136 MMOL/L (ref 135–147)
SPECIMEN SOURCE: ABNORMAL
VENT AC: 18
VENT- AC: AC
VT SETTING VENT: 450 ML
WBC # BLD AUTO: 8.18 THOUSAND/UL (ref 4.31–10.16)

## 2024-06-04 PROCEDURE — 94669 MECHANICAL CHEST WALL OSCILL: CPT

## 2024-06-04 PROCEDURE — 82805 BLOOD GASES W/O2 SATURATION: CPT

## 2024-06-04 PROCEDURE — 94760 N-INVAS EAR/PLS OXIMETRY 1: CPT

## 2024-06-04 PROCEDURE — 36600 WITHDRAWAL OF ARTERIAL BLOOD: CPT

## 2024-06-04 PROCEDURE — 71045 X-RAY EXAM CHEST 1 VIEW: CPT

## 2024-06-04 PROCEDURE — NC001 PR NO CHARGE: Performed by: INTERNAL MEDICINE

## 2024-06-04 PROCEDURE — 85025 COMPLETE CBC W/AUTO DIFF WBC: CPT | Performed by: NURSE PRACTITIONER

## 2024-06-04 PROCEDURE — 0B9M8ZZ DRAINAGE OF BILATERAL LUNGS, VIA NATURAL OR ARTIFICIAL OPENING ENDOSCOPIC: ICD-10-PCS | Performed by: INTERNAL MEDICINE

## 2024-06-04 PROCEDURE — 94150 VITAL CAPACITY TEST: CPT

## 2024-06-04 PROCEDURE — 31622 DX BRONCHOSCOPE/WASH: CPT | Performed by: INTERNAL MEDICINE

## 2024-06-04 PROCEDURE — 80048 BASIC METABOLIC PNL TOTAL CA: CPT | Performed by: NURSE PRACTITIONER

## 2024-06-04 PROCEDURE — 94640 AIRWAY INHALATION TREATMENT: CPT

## 2024-06-04 PROCEDURE — 94003 VENT MGMT INPAT SUBQ DAY: CPT

## 2024-06-04 PROCEDURE — 99291 CRITICAL CARE FIRST HOUR: CPT | Performed by: INTERNAL MEDICINE

## 2024-06-04 RX ORDER — SODIUM CHLORIDE, SODIUM GLUCONATE, SODIUM ACETATE, POTASSIUM CHLORIDE, MAGNESIUM CHLORIDE, SODIUM PHOSPHATE, DIBASIC, AND POTASSIUM PHOSPHATE .53; .5; .37; .037; .03; .012; .00082 G/100ML; G/100ML; G/100ML; G/100ML; G/100ML; G/100ML; G/100ML
1000 INJECTION, SOLUTION INTRAVENOUS ONCE
Status: COMPLETED | OUTPATIENT
Start: 2024-06-04 | End: 2024-06-04

## 2024-06-04 RX ORDER — FENTANYL CITRATE 50 UG/ML
100 INJECTION, SOLUTION INTRAMUSCULAR; INTRAVENOUS ONCE
Status: COMPLETED | OUTPATIENT
Start: 2024-06-04 | End: 2024-06-04

## 2024-06-04 RX ORDER — ATROPINE SULFATE 1 MG/ML
1 INJECTION, SOLUTION INTRAVENOUS ONCE
Status: COMPLETED | OUTPATIENT
Start: 2024-06-04 | End: 2024-06-04

## 2024-06-04 RX ORDER — MIDAZOLAM HYDROCHLORIDE 2 MG/2ML
2 INJECTION, SOLUTION INTRAMUSCULAR; INTRAVENOUS ONCE
Status: DISCONTINUED | OUTPATIENT
Start: 2024-06-04 | End: 2024-06-04

## 2024-06-04 RX ORDER — LIDOCAINE HYDROCHLORIDE 10 MG/ML
INJECTION, SOLUTION EPIDURAL; INFILTRATION; INTRACAUDAL; PERINEURAL
Status: DISPENSED
Start: 2024-06-04 | End: 2024-06-05

## 2024-06-04 RX ADMIN — FENTANYL CITRATE 100 MCG: 50 INJECTION INTRAMUSCULAR; INTRAVENOUS at 14:59

## 2024-06-04 RX ADMIN — SODIUM CHLORIDE SOLN NEBU 3% 4 ML: 3 NEBU SOLN at 02:41

## 2024-06-04 RX ADMIN — LEVALBUTEROL HYDROCHLORIDE 1.25 MG: 1.25 SOLUTION RESPIRATORY (INHALATION) at 13:23

## 2024-06-04 RX ADMIN — GUAIFENESIN 200 MG: 200 SOLUTION ORAL at 15:04

## 2024-06-04 RX ADMIN — ATROPINE SULFATE 1 MG: 1 INJECTION, SOLUTION INTRAVENOUS at 20:45

## 2024-06-04 RX ADMIN — LEVALBUTEROL HYDROCHLORIDE 1.25 MG: 1.25 SOLUTION RESPIRATORY (INHALATION) at 02:41

## 2024-06-04 RX ADMIN — CHLORHEXIDINE GLUCONATE 15 ML: 1.2 RINSE ORAL at 20:46

## 2024-06-04 RX ADMIN — SODIUM CHLORIDE SOLN NEBU 3% 4 ML: 3 NEBU SOLN at 08:12

## 2024-06-04 RX ADMIN — GUAIFENESIN 200 MG: 200 SOLUTION ORAL at 04:00

## 2024-06-04 RX ADMIN — GUAIFENESIN 200 MG: 200 SOLUTION ORAL at 09:27

## 2024-06-04 RX ADMIN — CHLORHEXIDINE GLUCONATE 15 ML: 1.2 RINSE ORAL at 09:27

## 2024-06-04 RX ADMIN — APIXABAN 5 MG: 5 TABLET, FILM COATED ORAL at 09:27

## 2024-06-04 RX ADMIN — CEFEPIME 2000 MG: 2 INJECTION, POWDER, FOR SOLUTION INTRAVENOUS at 16:57

## 2024-06-04 RX ADMIN — LEVALBUTEROL HYDROCHLORIDE 1.25 MG: 1.25 SOLUTION RESPIRATORY (INHALATION) at 08:12

## 2024-06-04 RX ADMIN — FLUOXETINE 10 MG: 10 CAPSULE ORAL at 09:27

## 2024-06-04 RX ADMIN — GUAIFENESIN 200 MG: 200 SOLUTION ORAL at 20:46

## 2024-06-04 RX ADMIN — SODIUM CHLORIDE SOLN NEBU 3% 4 ML: 3 NEBU SOLN at 13:23

## 2024-06-04 RX ADMIN — SODIUM CHLORIDE, SODIUM GLUCONATE, SODIUM ACETATE, POTASSIUM CHLORIDE, MAGNESIUM CHLORIDE, SODIUM PHOSPHATE, DIBASIC, AND POTASSIUM PHOSPHATE 1000 ML: .53; .5; .37; .037; .03; .012; .00082 INJECTION, SOLUTION INTRAVENOUS at 16:17

## 2024-06-04 RX ADMIN — LEVALBUTEROL HYDROCHLORIDE 1.25 MG: 1.25 SOLUTION RESPIRATORY (INHALATION) at 19:57

## 2024-06-04 RX ADMIN — APIXABAN 5 MG: 5 TABLET, FILM COATED ORAL at 17:05

## 2024-06-04 RX ADMIN — SODIUM CHLORIDE SOLN NEBU 3% 4 ML: 3 NEBU SOLN at 19:57

## 2024-06-04 NOTE — ASSESSMENT & PLAN NOTE
Concern for increased vagal stimulation resulting hypoxic bradycardia 2/2 to increased secretions and position of trach with mucus plugging  Patient has had multiple episodes of bradycardia into 20-30s due to increased trach secretions with mucus plugging, worsening depended on trach position, thought to stimulate vagal bradycardia. Of note on 5/31, 1x episode resulted in  pulseless asystole without hypoxia, s/p 2 x of epi, bicarb, and calcium, PEA on monitor, ROSC achieved with clearance of secretions.   S/p tracheostomy in 9/2023 at [facility], initially 8.0 cuffed,downsized to a Shiley 4 then capping trial.  Several days into the capping trial he developed aspiration pneumonia and ended up being hospitalized at [facility] with a upsized tracheostomy to 8.0, he had a bronchoscopy performed and required full ventilator support at that time.  Trach exchanged to 6.0 cuffed on 3/18/2024  Home Vent A/ VC withTV 550, RR 24, MV 12-14 L, 100% spontaneous triggers  S/p Bronch on 06/01, exchanged Trach for 6XL during bronch, no further bradycardic events occurred throughout the day  06/02-brooke to 35, hypoxic to 50%, suctioned, noted to have seizure-like activity, 2mg Ativan given   Appreciate neurology consults, shaking believed to be 2/2 hypoxia, no findings on EEG indicating seizure activity.    Plan:  Continue managing secretions, possible bronchoscopy today.

## 2024-06-04 NOTE — OCCUPATIONAL THERAPY NOTE
Occupational Therapy Cancellation Note     Patient Name: Hemanth Swanson  Today's Date: 6/4/2024 06/04/24 1015   Note Type   Note type Cancelled Session   Cancel Reasons Medical status   Additional Comments OT consult received and chart reviewed. Per ICU mobility rounds, patient is not appropriate for participation in OT evaluation on this date d/t bradycardia. Will hold and f/u as able and appropriate.     Merced Forte MS OTR/L   [place] Licensure# [id number]

## 2024-06-04 NOTE — ASSESSMENT & PLAN NOTE
"Presented from PCP with hypoxia with desat in 70s and increased WOB, admitted for, Multilobar PNA on dependent vent   Sepsis Criteria meet: due to leukocytosis and tachypnea.  Suspect the source of pneumonia  CT PE chest: multilobar PNA, most severe in [place], aspiration not excluded, bilateral atelectasis, NO PE  Started on Zoysn abx and deescalated to Ceftriaxone on 5/31, switched to Cefepime on 6/2  Blood cultures and Strep/Legionella Urine cultures negative  Sputum cultures resulted with Serratia marcescens, and Pseudomonas aeruginosa.    No results for input(s): \"PROCALCITONI\" in the last 72 hours.    Recent Labs     06/02/24  0454 06/03/24  0446 06/04/24  0442   WBC 7.91 7.94 8.18       Continue Cefepime 2/7  Continue Xopenex, Hypertonic Nebs  Continue Mucinex   "

## 2024-06-04 NOTE — RESPIRATORY THERAPY NOTE
RT Ventilator Management Note  Hemanth Swanson 39 y.o. male MRN: [id number]  Unit/Bed#: ICU 12 Encounter: [id number]      Daily Screen         6/3/2024  0744 6/4/2024  0812          Patient safety screen outcome:: Failed Passed      Not Ready for Weaning due to:: Underline problem not resolved --                Physical Exam:   Assessment Type: Assess only  General Appearance: Alert, Awake  Respiratory Pattern: Assisted  Chest Assessment: Chest expansion symmetrical  Bilateral Breath Sounds: Diminished, Rhonchi      Resp Comments: (P) Patient remains on home vent settings     06/04/24 0812   Respiratory Assessment   Assessment Type Assess only   General Appearance Alert;Awake   Respiratory Pattern Assisted   Chest Assessment Chest expansion symmetrical   Bilateral Breath Sounds Diminished;Rhonchi   Resp Comments Patient remains on home vent settings   Vent Information   Vent    Vent type Hook G5   [place] Vent Mode (S)CMV   $ Vital Capacity Mech/Peak Flow Yes   $ Pulse Oximetry Spot Check Charge Completed   SpO2 93 %   (S)CMV Settings   Resp Rate (BPM) 18 BPM   VT (mL) 500 mL   FIO2 (%) 40 %   PEEP (cmH2O) 6 cmH2O   I:E Ratio 1:2.7   Insp Time (%) 0.9 %   Flow Trigger (LPM) 3   Humidification Heater   Heater Temperature (Set) 98.8 °F (37.1 °C)   Pause Time (%) 0 %   (S)CMV Actuals   Resp Rate (BPM) 18 BPM   VT (mL) 505   MV 9.3   MAP (cmH2O) 13 cmH2O   Peak Pressure (cmH2O) 36 cmH2O   I:E Ratio (Obs) 1:2.7   Insp Resistance 29   Heater Temperature (Obs) 98.6 °F (37 °C)   Static Compliance (mL/cmH20) 28.1 mL/cmH2O   Plateau Pressure (cm H2O) 23.3 cm H2O   (S)CMV Alarms   High Peak Pressure (cmH2O) 50   Low Pressure (cmH2O) 8 cm H2O   High Resp Rate (BPM) 40 BPM   Low Resp Rate (BPM) 8 BPM   High MV (L/min) 16 L/min   Low MV (L/min) 5 L/min   High VT (mL) 800 mL   Low VT (mL) 250 mL   Apnea Time (s) 20 S   Maintenance   Alarm (pink) cable attached Yes   Resuscitation bag with peep valve at bedside Yes    Water bag changed No   Circuit changed No   Daily Screen   Patient safety screen outcome: Passed   Not Ready for Weaning due to:   (patient on home settings)   Surgical Airway Distal;Cuffed;Other (Comment)   Placement Date/Time: 06/01/24 1155   Tube Size: 6  Placed By: Physician  Placed by (Name): Dr. Ronquillo  Type: Tracheostomy  Style: (c) Distal;Cuffed;Other (Comment)   Status Cuff Inflated   Site Assessment Clean;Dry   Site Care Cleansed;Dried;Dressing applied   Inner Cannula Care Changed/new   Ties Assessment Clean;Dry;Intact;Secure   Surgical Airway Cuff Pressure (color) Green   Equipment at bedside BVM;Wall Suction setup;Additional complete same size trach tube;Obturator;Sterile saline;Additional inner cannula

## 2024-06-04 NOTE — ASSESSMENT & PLAN NOTE
"Presented from PCP with hypoxia with desat in 70s and increased WOB, admitted for, Multilobar PNA on dependent vent   Sepsis Criteria meet: due to leukocytosis and tachypnea.  Suspect the source of pneumonia  CT PE chest: multilobar PNA, most severe in [place], aspiration not excluded, bilateral atelectasis, NO PE  Started on Zoysn abx and deescalated to Ceftriaxone on 5/31, switched to Cefepime on 6/2  Blood cultures and Strep/Legionella Urine cultures negative  Sputum cultures resulted with Serratia marcescens, and Pseudomonas aeruginosa.    No results for input(s): \"PROCALCITONI\" in the last 72 hours.    Recent Labs     06/02/24  0454 06/03/24  0446 06/04/24  0442   WBC 7.91 7.94 8.18       Continue Cefepime 2/7  Continue Xopenex, Hypertonic Nebs  Continue Mucinex   Bronchoscopy done on 6/4/24  "

## 2024-06-04 NOTE — ASSESSMENT & PLAN NOTE
Concern for increased vagal stimulation resulting hypoxic bradycardia 2/2 to increased secretions and position of trach with mucus plugging  Patient has had multiple episodes of bradycardia into 20-30s due to increased trach secretions with mucus plugging, worsening depended on trach position, thought to stimulate vagal bradycardia. Of note on 5/31, 1x episode resulted in  pulseless asystole without hypoxia, s/p 2 x of epi, bicarb, and calcium, PEA on monitor, ROSC achieved with clearance of secretions.   S/p tracheostomy in 9/2023 at [facility], initially 8.0 cuffed,downsized to a Shiley 4 then capping trial.  Several days into the capping trial he developed aspiration pneumonia and ended up being hospitalized at [facility] with a upsized tracheostomy to 8.0, he had a bronchoscopy performed and required full ventilator support at that time.  Trach exchanged to 6.0 cuffed on 3/18/2024  Home Vent A/ VC withTV 550, RR 24, MV 12-14 L, 100% spontaneous triggers  S/p Bronch on 06/01, exchanged Trach for 6XL during bronch, no further bradycardic events occurred throughout the day  06/02-brooke to 35, hypoxic to 50%, suctioned, noted to have seizure-like activity, 2mg Ativan given   Appreciate neurology consults, shaking believed to be 2/2 hypoxia, no findings on EEG indicating seizure activity.  Bronchoscopy done 6/4    Plan:  Continue managing secretions.

## 2024-06-04 NOTE — ASSESSMENT & PLAN NOTE
2x seizure activity, went bradycardic and hypoxic, unsure if seizure activity causes bradycardia/hypoxia or hypoxia/bradycardia causes seizure activity  05/31-06/01 overnight, pulseless asystole without hypoxia, s/p 2 x of epi, bicarb, and calcium, PEA on monitor, ROSC achieved with clearance of secretions, followed byseizure like activity, ativan 2mg given, seizure activity aborted  06/02-brooke to 35, hypoxic to 50%, suctioned, noted to have seizure-like activity, 2mg Ativan given   EEG resulted as normal, activity believed to be due to hypoxia    Plan:  Determined to not be seizure activity, manage secretions and try to keep oxygen sats above 90%.

## 2024-06-04 NOTE — PHYSICAL THERAPY NOTE
PHYSICAL THERAPY NOTE    Patient Name: Hemanth Swanson  Today's Date: 6/4/2024 06/04/24 1030   Note Type   Note type Cancelled Session   Cancel Reasons Medical status   Additional Comments Per ICU mobility rounds, pt is not medically appropriate for PT today due to medical status (bradycardia, possible bronch). Will cancel and follow      Berny Garcia, PT

## 2024-06-04 NOTE — RESPIRATORY THERAPY NOTE
Patient noted to have a decreasing HR on the monitor. Upon arrival patients HR in the 30s with SpO2 in the 70s vent alarming for low TV and Minute ventilation. Patient was taken off vent and bagged with max peep on the bag (per fellow), patient was difficult to bag. Patient was bagged for roughly 5 minutes before placing patient back on the vent where he was getting volumes and had Spo2 back in the 90s.

## 2024-06-04 NOTE — ASSESSMENT & PLAN NOTE
Hx of Testicular CA, S/P 3 cycles CDDP/ ,  then developed neuromuscular syndrome including urinary incontinence and resp failure resulting in vent-dependent, has not established with Neuro  History of anoxic brain injury and unknown neuromuscular disorder  S/p tracheostomy in 9/2023 at [facility], initially 8.0 cuffed,downsized to a Shiley 4 then capping trial.  Several days into the capping trial he developed aspiration pneumonia and ended up being hospitalized at [facility] with a upsized tracheostomy to 8.0, he had a bronchoscopy performed and required full ventilator support at that time.  Trach exchanged to 6.0 cuffed on 3/18/2024  Ventilator dependent 24/7  Continue home vent settings: 18/500/40%/6

## 2024-06-04 NOTE — RESPIRATORY THERAPY NOTE
Bedside bronch preformed with RN, Dr. ALEXANDRE, fellow and this writer. Patient was placed on 100% prior to bronch. Patient tolerated procedure well. Patient required 2cc of lidocaine instilled down ETT. No saline was used to instill down ET. Post bronch patient was weaned back to 40%.

## 2024-06-04 NOTE — PROGRESS NOTES
[facility]  Progress Note  Name: Hemanth Swanson I  MRN: [id number]  Unit/Bed#: ICU 12 I Date of Admission: 5/30/2024   Date of Service: 6/4/2024 I Hospital Day: 5    Assessment & Plan   Seizure-like activity (HCC)  Assessment & Plan  2x seizure activity, went bradycardic and hypoxic, unsure if seizure activity causes bradycardia/hypoxia or hypoxia/bradycardia causes seizure activity  05/31-06/01 overnight, pulseless asystole without hypoxia, s/p 2 x of epi, bicarb, and calcium, PEA on monitor, ROSC achieved with clearance of secretions, followed byseizure like activity, ativan 2mg given, seizure activity aborted  06/02-brooke to 35, hypoxic to 50%, suctioned, noted to have seizure-like activity, 2mg Ativan given   EEG resulted as normal, activity believed to be due to hypoxia    Plan:  Determined to not be seizure activity, manage secretions and try to keep oxygen sats above 90%.    Bradycardia  Assessment & Plan  Concern for increased vagal stimulation resulting hypoxic bradycardia 2/2 to increased secretions and position of trach with mucus plugging  Patient has had multiple episodes of bradycardia into 20-30s due to increased trach secretions with mucus plugging, worsening depended on trach position, thought to stimulate vagal bradycardia. Of note on 5/31, 1x episode resulted in  pulseless asystole without hypoxia, s/p 2 x of epi, bicarb, and calcium, PEA on monitor, ROSC achieved with clearance of secretions.   S/p tracheostomy in 9/2023 at [facility], initially 8.0 cuffed,downsized to a Shiley 4 then capping trial.  Several days into the capping trial he developed aspiration pneumonia and ended up being hospitalized at [facility] with a upsized tracheostomy to 8.0, he had a bronchoscopy performed and required full ventilator support at that time.  Trach exchanged to 6.0 cuffed on 3/18/2024  Home Vent A/ VC withTV 550, RR 24, MV 12-14 L, 100% spontaneous triggers  S/p Bronch  on 06/01, exchanged Trach for 6XL during bronch, no further bradycardic events occurred throughout the day  06/02-brooke to 35, hypoxic to 50%, suctioned, noted to have seizure-like activity, 2mg Ativan given   Appreciate neurology consults, shaking believed to be 2/2 hypoxia, no findings on EEG indicating seizure activity.    Plan:  Continue managing secretions, possible bronchoscopy today.    Neuromuscular disorder ([facility])  Assessment & Plan  Hx of Testicular CA, S/P 3 cycles CDDP/ ,  then developed neuromuscular syndrome including urinary incontinence and resp failure resulting in vent-dependent, has not established with Neuro  Neurology outpatient on 7/17    Ventilator dependent ([facility])  Assessment & Plan  Hx of Testicular CA, S/P 3 cycles CDDP/ ,  then developed neuromuscular syndrome including urinary incontinence and resp failure resulting in vent-dependent, has not established with Neuro  History of anoxic brain injury and unknown neuromuscular disorder  S/p tracheostomy in 9/2023 at [facility], initially 8.0 cuffed,downsized to a Shiley 4 then capping trial.  Several days into the capping trial he developed aspiration pneumonia and ended up being hospitalized at [facility] with a upsized tracheostomy to 8.0, he had a bronchoscopy performed and required full ventilator support at that time.  Trach exchanged to 6.0 cuffed on 3/18/2024  Ventilator dependent 24/7  Continue home vent settings: 18/500/40%/6      Impaired physical mobility  Assessment & Plan  -PT/OT for eval  -Case management for placement    Status post tracheostomy ([facility])  Assessment & Plan  S/p tracheostomy in 9/2023 at [facility], initially 8.0 cuffed,downsized to a Shiley 4 then capping trial.  Several days into the capping trial he developed aspiration pneumonia and ended up being hospitalized at [facility] with a upsized tracheostomy to 8.0, he had a bronchoscopy performed and required full ventilator support at that time.  " Trach exchanged to 6.0 cuffed on 3/18/2024  Home Vent A/ VC withTV 550, RR 24, MV 12-14 L, 100% spontaneous triggers  Concern for increased vagal stimulation resulting hypoxic bradycardia 2/2 to increased secretions and position of trach with mucus plugging  S/p Bronch on 06/01, exchanged Trach for 6XL during bronch  Many hypoxic events yesterday, no events overnight    Plan:  Monitor for s/s of infection, currently has increased secretions, likely 2/2 to PNA  Plan for bronchoscopy if hypoxic episodes resume    S/P percutaneous endoscopic gastrostomy (PEG) tube placement (HCC)  Assessment & Plan  -Monitor for s/s infection      Depression  Assessment & Plan  Continue fluoxetine    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[facility])  Assessment & Plan  Testicular cancer s/p left orchiectomy on 12/2022  Repeat CT scan in January 2023 with enlarging lymph node.  Began Chemo in March 2023: Etoposide and Cisplatin with plan for 4 cycles, 5 days every 21 days  Did not complete chemo treatment due to adverse effects  After 3rd cycle reported double vision, labile affect and right sided weakness and therefore chemotherapy was stopped on 5/26/23  Outpatient follow up with Heme/Onc     * Sepsis ([facility])  Assessment & Plan  Presented from PCP with hypoxia with desat in 70s and increased WOB, admitted for, Multilobar PNA on dependent vent   Sepsis Criteria meet: due to leukocytosis and tachypnea.  Suspect the source of pneumonia  CT PE chest: multilobar PNA, most severe in [place], aspiration not excluded, bilateral atelectasis, NO PE  Started on Zoysn abx and deescalated to Ceftriaxone on 5/31, switched to Cefepime on 6/2  Blood cultures and Strep/Legionella Urine cultures negative  Sputum cultures resulted with Serratia marcescens, and Pseudomonas aeruginosa.    No results for input(s): \"PROCALCITONI\" in the last 72 hours.    Recent Labs     06/02/24  0454 06/03/24  0446 06/04/24  0442   WBC 7.91 7.94 8.18       Continue Cefepime 2/7  Continue Xopenex, " Hypertonic Nebs  Continue Mucinex              Disposition: Critical care    ICU Core Measures     Vented Patient  VAP Bundle  VAP bundle ordered     A: Assess, Prevent, and Manage Pain Has pain been assessed? Yes  Need for changes to pain regimen? No   B: Both Spontaneous Awakening Trials (SATs) and Spontaneous Breathing Trials (SBTs) Plan to perform spontaneous awakening trial today? N/A   Plan to perform spontaneous breathing trial today? Chronic vent   Obvious barriers to extubation? Yes   C: Choice of Sedation RASS Goal: 0 Alert and Calm  Need for changes to sedation or analgesia regimen? No   D: Delirium CAM-ICU: Negative   E: Early Mobility  Plan for early mobility? No   F: Family Engagement Plan for family engagement today? Yes       Review of Invasive Devices:      Central access plan:  Port in place, and good IV access      Prophylaxis:  VTE VTE covered by:  apixaban, Per PEG Tube, 5 mg at 06/03/24 1731       Stress Ulcer  not ordered         Significant 24hr Events     24hr events: Multiple hypoxic/bradycardic events yesterday, no events overnight     Subjective   Review of Systems: See HPI for Review of Systems     Objective                            Vitals I/O      Most Recent Min/Max in 24hrs   Temp 99.3 °F (37.4 °C) Temp  Min: 98.5 °F (36.9 °C)  Max: 99.3 °F (37.4 °C)   Pulse 70 Pulse  Min: 67  Max: 76   Resp 18 Resp  Min: 18  Max: 21   /71 BP  Min: 88/58  Max: 132/79   O2 Sat 97 % SpO2  Min: 90 %  Max: 100 %      Intake/Output Summary (Last 24 hours) at 6/4/2024 0755  Last data filed at 6/4/2024 0600  Gross per 24 hour   Intake 2690 ml   Output 650 ml   Net 2040 ml       Diet Enteral/Parenteral; Tube Feeding No Oral Diet; Jevity 1.5; Continuous; 70; 200; Water; Every 4 hours    Invasive Monitoring           Physical Exam   Physical Exam  Vitals and nursing note reviewed.   Eyes:      Extraocular Movements: Extraocular movements intact.      Conjunctiva/sclera: Conjunctivae normal.      Pupils:  Pupils are equal, round, and reactive to light.   Skin:     General: Skin is warm.   HENT:      Head: Normocephalic and atraumatic.      Right Ear: Hearing normal.      Left Ear: Hearing normal.      Nose: No nasal deformity or congestion.      Mouth/Throat:      Mouth: Mucous membranes are moist.   Neck:      Trachea: Tracheostomy present.   Cardiovascular:      Rate and Rhythm: Normal rate and regular rhythm.      Pulses: Normal pulses.      Heart sounds: Normal heart sounds.   Musculoskeletal:      Right lower leg: No edema.      Left lower leg: No edema.      Comments: At baseline, prox muscle weakness,  strength 4/5.   Abdominal: General: There is no distension.      Palpations: Abdomen is soft.      Tenderness: There is no abdominal tenderness.   Constitutional:       General: He is not in acute distress.     Appearance: He is well-developed.   Pulmonary:      Breath sounds: Rhonchi present.   Neurological:      Mental Status: He is alert. Mental status is at baseline.   Genitourinary/Anorectal:  external catheter present.          Diagnostic Studies      EKG:   Imaging:  I have personally reviewed pertinent reports.   and I have personally reviewed pertinent films in PACS     Medications:  Scheduled PRN   apixaban, 5 mg, BID  chlorhexidine, 15 mL, Q12H ISAEL  FLUoxetine, 10 mg, Daily  guaiFENesin, 200 mg, Q6H  levalbuterol, 1.25 mg, Q6H  sodium chloride, 4 mL, Q6H      atropine, 0.5 mg, Once PRN       Continuous          Labs:    CBC    Recent Labs     06/03/24  0446 06/04/24  0442   WBC 7.94 8.18   HGB 11.3* 12.0   HCT 35.1* 36.9    221     BMP    Recent Labs     06/03/24  0446 06/04/24  0442   SODIUM 136 136   K 3.5 4.0    102   CO2 26 27   AGAP 7 7   BUN 8 11   CREATININE 0.66 0.57*   CALCIUM 8.8 9.1       Coags    No recent results     Additional Electrolytes  Recent Labs     06/03/24  0446   MG 2.2   PHOS 3.7          Blood Gas    No recent results  No recent results LFTs  No recent  results    Infectious  No recent results  Glucose  Recent Labs     06/03/24  0446 06/04/24  0442   GLUC 101 105               Rachel Sharma MD

## 2024-06-04 NOTE — RESPIRATORY THERAPY NOTE
Called by RN due to patient peak pressuring in the 50s. RN attempted to suction to fix issue but patients peaks still juan c. Upon assessment peak pressures noted to be 55. Suctioned/lavaged patient with no changes in peak pressures. AP made aware.

## 2024-06-04 NOTE — PLAN OF CARE
Problem: Potential for Falls  Goal: Patient will remain free of falls  Description: INTERVENTIONS:  - Educate patient/family on patient safety including physical limitations  - Instruct patient to call for assistance with activity   - Consult OT/PT to assist with strengthening/mobility   - Keep Call bell within reach  - Keep bed low and locked with side rails adjusted as appropriate  - Keep care items and personal belongings within reach  - Initiate and maintain comfort rounds  - Make Fall Risk Sign visible to staff  - Offer Toileting every 2 Hours, in advance of need  - Initiate/Maintain bed alarm    - Apply yellow socks and bracelet for high fall risk patients  - Consider moving patient to room near nurses station  Outcome: Progressing     Problem: Prexisting or High Potential for Compromised Skin Integrity  Goal: Skin integrity is maintained or improved  Description: INTERVENTIONS:  - Identify patients at risk for skin breakdown  - Assess and monitor skin integrity  - Assess and monitor nutrition and hydration status  - Monitor labs   - Assess for incontinence   - Turn and reposition patient  - Assist with mobility/ambulation  - Relieve pressure over bony prominences  - Avoid friction and shearing  - Provide appropriate hygiene as needed including keeping skin clean and dry  - Evaluate need for skin moisturizer/barrier cream  - Collaborate with interdisciplinary team   - Patient/family teaching  - Consider wound care consult   Outcome: Progressing     Problem: Nutrition/Hydration-ADULT  Goal: Nutrient/Hydration intake appropriate for improving, restoring or maintaining nutritional needs  Description: Monitor and assess patient's nutrition/hydration status for malnutrition. Collaborate with interdisciplinary team and initiate plan and interventions as ordered.  Monitor patient's weight and dietary intake as ordered or per policy. Utilize nutrition screening tool and intervene as necessary. Determine patient's  food preferences and provide high-protein, high-caloric foods as appropriate.     INTERVENTIONS:  - Monitor oral intake, urinary output, labs, and treatment plans  - Assess nutrition and hydration status and recommend course of action  - Evaluate amount of meals eaten  - Assist patient with eating if necessary   - Allow adequate time for meals  - Recommend/ encourage appropriate diets, oral nutritional supplements, and vitamin/mineral supplements  - Order, calculate, and assess calorie counts as needed  - Recommend, monitor, and adjust tube feedings and TPN/PPN based on assessed needs  - Assess need for intravenous fluids  - Provide specific nutrition/hydration education as appropriate  - Include patient/family/caregiver in decisions related to nutrition  Outcome: Progressing     Problem: DISCHARGE PLANNING  Goal: Discharge to home or other facility with appropriate resources  Description: INTERVENTIONS:  - Identify barriers to discharge w/patient and caregiver  - Arrange for needed discharge resources and transportation as appropriate  - Identify discharge learning needs (meds, wound care, etc.)  - Arrange for interpretive services to assist at discharge as needed  - Refer to Case Management Department for coordinating discharge planning if the patient needs post-hospital services based on physician/advanced practitioner order or complex needs related to functional status, cognitive ability, or social support system  Outcome: Progressing     Problem: Knowledge Deficit  Goal: Patient/family/caregiver demonstrates understanding of disease process, treatment plan, medications, and discharge instructions  Description: Complete learning assessment and assess knowledge base.  Interventions:  - Provide teaching at level of understanding  - Provide teaching via preferred learning methods  Outcome: Progressing     Problem: INFECTION - ADULT  Goal: Absence or prevention of progression during hospitalization  Description:  INTERVENTIONS:  - Assess and monitor for signs and symptoms of infection  - Monitor lab/diagnostic results  - Monitor all insertion sites, i.e. indwelling lines, tubes, and drains  - Monitor endotracheal if appropriate and nasal secretions for changes in amount and color  - Barton appropriate cooling/warming therapies per order  - Administer medications as ordered  - Instruct and encourage patient and family to use good hand hygiene technique  - Identify and instruct in appropriate isolation precautions for identified infection/condition  Outcome: Progressing  Goal: Absence of fever/infection during neutropenic period  Description: INTERVENTIONS:  - Monitor WBC    Outcome: Progressing     Problem: PAIN - ADULT  Goal: Verbalizes/displays adequate comfort level or baseline comfort level  Description: Interventions:  - Encourage patient to monitor pain and request assistance  - Assess pain using appropriate pain scale  - Administer analgesics based on type and severity of pain and evaluate response  - Implement non-pharmacological measures as appropriate and evaluate response  - Consider cultural and social influences on pain and pain management  - Notify physician/advanced practitioner if interventions unsuccessful or patient reports new pain  Outcome: Progressing

## 2024-06-05 ENCOUNTER — APPOINTMENT (INPATIENT)
Dept: GASTROENTEROLOGY | Facility: HOSPITAL | Age: 39
DRG: 720 | End: 2024-06-05
Attending: INTERNAL MEDICINE
Payer: COMMERCIAL

## 2024-06-05 LAB
ANION GAP SERPL CALCULATED.3IONS-SCNC: 7 MMOL/L (ref 4–13)
BASOPHILS # BLD AUTO: 0.07 THOUSANDS/ÂΜL (ref 0–0.1)
BASOPHILS NFR BLD AUTO: 1 % (ref 0–1)
BNP SERPL-MCNC: 11 PG/ML (ref 0–100)
BUN SERPL-MCNC: 11 MG/DL (ref 5–25)
CALCIUM SERPL-MCNC: 8.8 MG/DL (ref 8.4–10.2)
CHLORIDE SERPL-SCNC: 102 MMOL/L (ref 96–108)
CO2 SERPL-SCNC: 27 MMOL/L (ref 21–32)
CREAT SERPL-MCNC: 0.49 MG/DL (ref 0.6–1.3)
EOSINOPHIL # BLD AUTO: 0.11 THOUSAND/ÂΜL (ref 0–0.61)
EOSINOPHIL NFR BLD AUTO: 1 % (ref 0–6)
ERYTHROCYTE [DISTWIDTH] IN BLOOD BY AUTOMATED COUNT: 15.9 % (ref 11.6–15.1)
GFR SERPL CREATININE-BSD FRML MDRD: 139 ML/MIN/1.73SQ M
GLUCOSE SERPL-MCNC: 104 MG/DL (ref 65–140)
HCT VFR BLD AUTO: 36.2 % (ref 36.5–49.3)
HGB BLD-MCNC: 11.6 G/DL (ref 12–17)
IMM GRANULOCYTES # BLD AUTO: 0.07 THOUSAND/UL (ref 0–0.2)
IMM GRANULOCYTES NFR BLD AUTO: 1 % (ref 0–2)
LYMPHOCYTES # BLD AUTO: 1.42 THOUSANDS/ÂΜL (ref 0.6–4.47)
LYMPHOCYTES NFR BLD AUTO: 16 % (ref 14–44)
MCH RBC QN AUTO: 29.4 PG (ref 26.8–34.3)
MCHC RBC AUTO-ENTMCNC: 32 G/DL (ref 31.4–37.4)
MCV RBC AUTO: 92 FL (ref 82–98)
MISCELLANEOUS LAB TEST RESULT: NORMAL
MONOCYTES # BLD AUTO: 0.71 THOUSAND/ÂΜL (ref 0.17–1.22)
MONOCYTES NFR BLD AUTO: 8 % (ref 4–12)
NEUTROPHILS # BLD AUTO: 6.32 THOUSANDS/ÂΜL (ref 1.85–7.62)
NEUTS SEG NFR BLD AUTO: 73 % (ref 43–75)
NRBC BLD AUTO-RTO: 0 /100 WBCS
PLATELET # BLD AUTO: 195 THOUSANDS/UL (ref 149–390)
PMV BLD AUTO: 9.9 FL (ref 8.9–12.7)
POTASSIUM SERPL-SCNC: 3.9 MMOL/L (ref 3.5–5.3)
RBC # BLD AUTO: 3.94 MILLION/UL (ref 3.88–5.62)
SODIUM SERPL-SCNC: 136 MMOL/L (ref 135–147)
WBC # BLD AUTO: 8.7 THOUSAND/UL (ref 4.31–10.16)

## 2024-06-05 PROCEDURE — 94640 AIRWAY INHALATION TREATMENT: CPT

## 2024-06-05 PROCEDURE — 99291 CRITICAL CARE FIRST HOUR: CPT | Performed by: INTERNAL MEDICINE

## 2024-06-05 PROCEDURE — 94669 MECHANICAL CHEST WALL OSCILL: CPT

## 2024-06-05 PROCEDURE — 94760 N-INVAS EAR/PLS OXIMETRY 1: CPT

## 2024-06-05 PROCEDURE — 94003 VENT MGMT INPAT SUBQ DAY: CPT

## 2024-06-05 PROCEDURE — 83880 ASSAY OF NATRIURETIC PEPTIDE: CPT

## 2024-06-05 PROCEDURE — 0B9K8ZZ DRAINAGE OF RIGHT LUNG, VIA NATURAL OR ARTIFICIAL OPENING ENDOSCOPIC: ICD-10-PCS | Performed by: INTERNAL MEDICINE

## 2024-06-05 PROCEDURE — 80048 BASIC METABOLIC PNL TOTAL CA: CPT | Performed by: NURSE PRACTITIONER

## 2024-06-05 PROCEDURE — 31622 DX BRONCHOSCOPE/WASH: CPT | Performed by: INTERNAL MEDICINE

## 2024-06-05 PROCEDURE — 85025 COMPLETE CBC W/AUTO DIFF WBC: CPT | Performed by: NURSE PRACTITIONER

## 2024-06-05 RX ORDER — FENTANYL CITRATE 50 UG/ML
50 INJECTION, SOLUTION INTRAMUSCULAR; INTRAVENOUS ONCE
Status: COMPLETED | OUTPATIENT
Start: 2024-06-05 | End: 2024-06-05

## 2024-06-05 RX ORDER — FENTANYL CITRATE 50 UG/ML
50 INJECTION, SOLUTION INTRAMUSCULAR; INTRAVENOUS ONCE AS NEEDED
Status: COMPLETED | OUTPATIENT
Start: 2024-06-05 | End: 2024-06-05

## 2024-06-05 RX ORDER — EPINEPHRINE 1 MG/ML
INJECTION, SOLUTION, CONCENTRATE INTRAVENOUS
Status: DISCONTINUED
Start: 2024-06-05 | End: 2024-06-05 | Stop reason: WASHOUT

## 2024-06-05 RX ORDER — LIDOCAINE HYDROCHLORIDE 10 MG/ML
INJECTION, SOLUTION EPIDURAL; INFILTRATION; INTRACAUDAL; PERINEURAL
Status: COMPLETED
Start: 2024-06-05 | End: 2024-06-05

## 2024-06-05 RX ORDER — FUROSEMIDE 10 MG/ML
40 INJECTION INTRAMUSCULAR; INTRAVENOUS ONCE
Status: COMPLETED | OUTPATIENT
Start: 2024-06-05 | End: 2024-06-05

## 2024-06-05 RX ORDER — LIDOCAINE HYDROCHLORIDE 10 MG/ML
10 INJECTION, SOLUTION EPIDURAL; INFILTRATION; INTRACAUDAL; PERINEURAL ONCE
Status: COMPLETED | OUTPATIENT
Start: 2024-06-05 | End: 2024-06-05

## 2024-06-05 RX ADMIN — GUAIFENESIN 200 MG: 200 SOLUTION ORAL at 20:32

## 2024-06-05 RX ADMIN — CEFEPIME 2000 MG: 2 INJECTION, POWDER, FOR SOLUTION INTRAVENOUS at 12:45

## 2024-06-05 RX ADMIN — CHLORHEXIDINE GLUCONATE 15 ML: 1.2 RINSE ORAL at 20:32

## 2024-06-05 RX ADMIN — SODIUM CHLORIDE SOLN NEBU 3% 4 ML: 3 NEBU SOLN at 02:33

## 2024-06-05 RX ADMIN — LIDOCAINE HYDROCHLORIDE 10 ML: 10 INJECTION, SOLUTION EPIDURAL; INFILTRATION; INTRACAUDAL; PERINEURAL at 15:36

## 2024-06-05 RX ADMIN — FENTANYL CITRATE 50 MCG: 50 INJECTION INTRAMUSCULAR; INTRAVENOUS at 15:29

## 2024-06-05 RX ADMIN — CEFEPIME 2000 MG: 2 INJECTION, POWDER, FOR SOLUTION INTRAVENOUS at 20:32

## 2024-06-05 RX ADMIN — LEVALBUTEROL HYDROCHLORIDE 1.25 MG: 1.25 SOLUTION RESPIRATORY (INHALATION) at 13:08

## 2024-06-05 RX ADMIN — LEVALBUTEROL HYDROCHLORIDE 1.25 MG: 1.25 SOLUTION RESPIRATORY (INHALATION) at 19:08

## 2024-06-05 RX ADMIN — LEVALBUTEROL HYDROCHLORIDE 1.25 MG: 1.25 SOLUTION RESPIRATORY (INHALATION) at 08:23

## 2024-06-05 RX ADMIN — LEVALBUTEROL HYDROCHLORIDE 1.25 MG: 1.25 SOLUTION RESPIRATORY (INHALATION) at 02:33

## 2024-06-05 RX ADMIN — GUAIFENESIN 200 MG: 200 SOLUTION ORAL at 15:12

## 2024-06-05 RX ADMIN — FUROSEMIDE 40 MG: 10 INJECTION, SOLUTION INTRAMUSCULAR; INTRAVENOUS at 08:48

## 2024-06-05 RX ADMIN — APIXABAN 5 MG: 5 TABLET, FILM COATED ORAL at 08:25

## 2024-06-05 RX ADMIN — CEFEPIME 2000 MG: 2 INJECTION, POWDER, FOR SOLUTION INTRAVENOUS at 04:20

## 2024-06-05 RX ADMIN — FENTANYL CITRATE 50 MCG: 50 INJECTION INTRAMUSCULAR; INTRAVENOUS at 15:45

## 2024-06-05 RX ADMIN — APIXABAN 5 MG: 5 TABLET, FILM COATED ORAL at 17:22

## 2024-06-05 RX ADMIN — FLUOXETINE 10 MG: 10 CAPSULE ORAL at 08:48

## 2024-06-05 RX ADMIN — GUAIFENESIN 200 MG: 200 SOLUTION ORAL at 08:25

## 2024-06-05 RX ADMIN — GUAIFENESIN 200 MG: 200 SOLUTION ORAL at 02:15

## 2024-06-05 RX ADMIN — CHLORHEXIDINE GLUCONATE 15 ML: 1.2 RINSE ORAL at 08:25

## 2024-06-05 NOTE — OCCUPATIONAL THERAPY NOTE
Occupational Therapy Cancellation Note     Patient Name: Hemanth Swanson  Today's Date: 6/5/2024 06/05/24 1020   Note Type   Note type Cancelled Session   Cancel Reasons Medical status   Additional Comments OT orders remains active, however per ICU mobility rounds, pt is not appropriate for participation in evaluation. Due to multiple medical cancels, will d/c current OT orders. Please reconsult once pt becomes more medically appropriate to participate in therapy.     Merced Forte MS OTR/L   [place] Licensure# [id number]

## 2024-06-05 NOTE — PROGRESS NOTES
[facility]  Interval Progress Note: Critical Care  Name: Hemanth Swanson I  MRN: [id number]  Unit/Bed#: ICU 12 I Date of Admission: 5/30/2024   Date of Service: 6/4/2024 I Hospital Day: 5    Interval Events:    While obtaining MRI patient was noted to become bradycardic, pallorous and have tremors.  Patient reportedly developed the symptoms after being moved to the MRI machine and laying flat.  Patient had tolerated laying flat in ICU prior to moving down to MRI without any issue.  Patient was given 1 mg of atropine, removed from MRI table, and had improvement of heart rate, skin color, and cessation of tremors.  MRI was aborted and patient was brought to ICU.     Pertinent New Data:   blood pressure 154/104, pulse 124, and pulse oximetry 100      I have personally reviewed pertinent lab results.        Assessment and Plan      Billing Level:  Critical Care Time Statement: Upon my evaluation, this patient had a high probability of imminent or life-threatening deterioration due to bradycardia, decreased perfusion, which required my direct attention, intervention, and personal management.  I spent a total of 10 minutes directly providing critical care services, including evaluating for the presence of life-threatening injuries or illnesses, management of organ system failure(s) , complex medical decision making (to support/prevent further life-threatening deterioration)., interpretation of hemodynamic data, and ventilator management. This time is exclusive of procedures, teaching, family meetings, and any prior time recorded by providers other than myself.      SIGNATURE: Quinton Hector MD

## 2024-06-05 NOTE — WOUND OSTOMY CARE
Progress Note - Wound   Hemanth Swanson 39 y.o. male MRN: [id number]  Unit/Bed#: ICU 12 Encounter: [id number]      Assessment:     Patient is a 38 yo male that was admitted to [facility]  for treatment of sepsis. Patient has a PMH of vent dependent trach 24/7 and PEG due to undiagnosed neurological condition, anoxic brain injury, history of PE on Eliquis, HTN, obesity, Testicular cancer s/p left orchiectomy, umbilical hernia. Patient is non verbal, able to follow commands and nod/gesture appropriately. Patient is a max assist for turning and repositioning, dependent for care, PEG tube in place for nutrition. Patient is incontinent of bowel, has external catheter in place for urine management. On assessment, patient is in ICU, on critical care low air loss mattress, with green ATR turn and reposition system and wedges in place, ventilator dependent with tenuous respiratory status.     Wound Care was consulted for left ear pressure injury.       POA Left Ear Unstageable Pressure Injury - Oval/Irregular shaped full thickness area of skin loss. Wound bed is covered in 100% yellow slough. Donna wound hyperpigmented and blanchable. Small amount of yellow drainage noted. Patient noted to favor lying on left side.     Sacrum, buttocks, heels- skin is pink, dry, intact, blanchable. Continue preventative measures as tolerated.     Primary RN and Critical Care AP at bedside; patient partially turned and unable to tolerate. Patient hypoxic and bradycardic, requiring intervention by respiratory therapist and critical care team.     No induration, fluctuance, odor, warmth, or purulence noted to the above noted wounds. New dressings applied. Primary nurse aware of plan of care. See flow sheets for more detailed assessment findings. Will follow along.     Skin care Plan:  1-Protect sacrum w/Silicone foam, alex with P, change q3d and PRN, check skin q-shift.  2-Turn/reposition q2h or when medically stable for pressure re-distribution on  skin.  3-Elevate heels to offload pressure.  4-Moisturize skin daily with skin nourishing cream.  5-Ehob cushion in chair when out of bed.  6-Hydraguard to bilateral heels BID and PRN.  7-Left ear: Irrigate with NSS. Pat dry. Cover wound bed with silver alginate. Apply 2x2 Silicone foam dressing to ear. Change  every other day      Wound 06/04/24 Pressure Injury Ear Left (Active)   Wound Image   06/05/24 1221   Wound Description Slough;Yellow 06/05/24 1221   Pressure Injury Stage U 06/05/24 1221   Donna-wound Assessment Hyperpigmented;Fragile 06/05/24 1221   Wound Length (cm) 1 cm 06/05/24 1221   Wound Width (cm) 1 cm 06/05/24 1221   Wound Depth (cm) 0 cm 06/05/24 1221   Wound Surface Area (cm^2) 1 cm^2 06/05/24 1221   Wound Volume (cm^3) 0 cm^3 06/05/24 1221   Calculated Wound Volume (cm^3) 0 cm^3 06/05/24 1221   Drainage Amount Small 06/05/24 1221   Drainage Description Yellow 06/05/24 1221   Non-staged Wound Description Full thickness 06/05/24 1221   Treatments Cleansed;Site care 06/05/24 1221   Dressing Dermagran gauze;Foam, Silicon (eg. Allevyn, etc) 06/05/24 1221   Wound packed? No 06/05/24 1221   Packing- # removed 0 06/05/24 1221   Packing- # inserted 0 06/05/24 1221   Dressing Changed New 06/05/24 1221   Patient Tolerance Tolerated well 06/05/24 1221   Dressing Status Clean;Dry;Intact 06/05/24 1221             Call or tiger[employer] with any questions  Wound Care will continue to follow while inpatient.      Radha Pate BSN RN CCRN  Wound and Ostomy Care

## 2024-06-05 NOTE — ASSESSMENT & PLAN NOTE
S/p tracheostomy in 9/2023 at [facility], initially 8.0 cuffed,downsized to a Shiley 4 then capping trial.  Several days into the capping trial he developed aspiration pneumonia and ended up being hospitalized at [facility] with a upsized tracheostomy to 8.0, he had a bronchoscopy performed and required full ventilator support at that time.  Trach exchanged to 6.0 cuffed on 3/18/2024  Home Vent A/ VC withTV 550, RR 24, MV 12-14 L, 100% spontaneous triggers  Concern for increased vagal stimulation resulting hypoxic bradycardia 2/2 to increased secretions and position of trach with mucus plugging  S/p Bronch on 06/01, exchanged Trach for 6XL during bronch  Many hypoxic events yesterday, no events overnight    Plan:  Monitor for s/s of infection, currently has increased secretions, likely 2/2 to PNA  Plan for bronchoscopy if hypoxic episodes resume

## 2024-06-05 NOTE — CASE MANAGEMENT
Case Management Progress Note    Patient name Hemanth Swanson  Location ICU 12/ICU 12 MRN [id number]  : 1985 Date 2024       LOS (days): 6  Geometric Mean LOS (GMLOS) (days):   Days to GMLOS:        OBJECTIVE:        Current admission status: Inpatient  Preferred Pharmacy:   [facility]/pharmacy #[id number] - [place], [place] - [address] [address]  Phone: [phone] Fax: [phone]    Primary Care Provider: Ela Douglas MD    Primary Insurance: [facility]  Secondary Insurance:     PROGRESS NOTE:    RISHABH spoke with Hilario with [employer] - they are working on having a second vent approved for home. Hilario requested script and documentation - Information provided to CC team to complete. Will need to send back to Hilario. Hilario reports that they will do an additional updated script closer to d/c, but this one needs to be completed as soon as able so they can work on the approval.

## 2024-06-05 NOTE — PROGRESS NOTES
[facility]  Progress Note  Name: Hemanth Swanson I  MRN: [id number]  Unit/Bed#: ICU 12 I Date of Admission: 5/30/2024   Date of Service: 6/5/2024 I Hospital Day: 6    Assessment & Plan   Seizure-like activity (HCC)  Assessment & Plan  2x seizure activity, went bradycardic and hypoxic, unsure if seizure activity causes bradycardia/hypoxia or hypoxia/bradycardia causes seizure activity  05/31-06/01 overnight, pulseless asystole without hypoxia, s/p 2 x of epi, bicarb, and calcium, PEA on monitor, ROSC achieved with clearance of secretions, followed byseizure like activity, ativan 2mg given, seizure activity aborted  06/02-brooke to 35, hypoxic to 50%, suctioned, noted to have seizure-like activity, 2mg Ativan given   EEG resulted as normal, activity believed to be due to hypoxia    Plan:  Determined to not be seizure activity, manage secretions and try to keep oxygen sats above 90%.    Bradycardia  Assessment & Plan  Concern for increased vagal stimulation resulting hypoxic bradycardia 2/2 to increased secretions and position of trach with mucus plugging  Patient has had multiple episodes of bradycardia into 20-30s due to increased trach secretions with mucus plugging, worsening depended on trach position, thought to stimulate vagal bradycardia. Of note on 5/31, 1x episode resulted in  pulseless asystole without hypoxia, s/p 2 x of epi, bicarb, and calcium, PEA on monitor, ROSC achieved with clearance of secretions.   S/p tracheostomy in 9/2023 at [facility], initially 8.0 cuffed,downsized to a Shiley 4 then capping trial.  Several days into the capping trial he developed aspiration pneumonia and ended up being hospitalized at [facility] with a upsized tracheostomy to 8.0, he had a bronchoscopy performed and required full ventilator support at that time.  Trach exchanged to 6.0 cuffed on 3/18/2024  Home Vent A/ VC withTV 550, RR 24, MV 12-14 L, 100% spontaneous triggers  S/p Bronch  on 06/01, exchanged Trach for 6XL during bronch, no further bradycardic events occurred throughout the day  06/02-brooke to 35, hypoxic to 50%, suctioned, noted to have seizure-like activity, 2mg Ativan given   Appreciate neurology consults, shaking believed to be 2/2 hypoxia, no findings on EEG indicating seizure activity.  Bronchoscopy done 6/4    Plan:  Continue managing secretions.    Neuromuscular disorder ([facility])  Assessment & Plan  Hx of Testicular CA, S/P 3 cycles CDDP/ ,  then developed neuromuscular syndrome including urinary incontinence and resp failure resulting in vent-dependent, has not established with Neuro  Neurology outpatient on 7/17    Ventilator dependent ([facility])  Assessment & Plan  Hx of Testicular CA, S/P 3 cycles CDDP/ ,  then developed neuromuscular syndrome including urinary incontinence and resp failure resulting in vent-dependent, has not established with Neuro  History of anoxic brain injury and unknown neuromuscular disorder  S/p tracheostomy in 9/2023 at [facility] ([facility] initially 8.0 cuffed,downsized to a Shiley 4 then capping trial.  Several days into the capping trial he developed aspiration pneumonia and ended up being hospitalized at [facility] with a upsized tracheostomy to 8.0, he had a bronchoscopy performed and required full ventilator support at that time.  Trach exchanged to 6.0 cuffed on 3/18/2024  Ventilator dependent 24/7  Continue home vent settings: 18/500/40%/6      Impaired physical mobility  Assessment & Plan  -PT/OT for eval  -Case management for placement    Status post tracheostomy ([facility])  Assessment & Plan  S/p tracheostomy in 9/2023 at [facility] initially 8.0 cuffed,downsized to a Shiley 4 then capping trial.  Several days into the capping trial he developed aspiration pneumonia and ended up being hospitalized at [facility] with a upsized tracheostomy to 8.0, he had a bronchoscopy performed and required full ventilator support at that time.   "Trach exchanged to 6.0 cuffed on 3/18/2024  Home Vent A/ VC withTV 550, RR 24, MV 12-14 L, 100% spontaneous triggers  Concern for increased vagal stimulation resulting hypoxic bradycardia 2/2 to increased secretions and position of trach with mucus plugging  S/p Bronch on 06/01, exchanged Trach for 6XL during bronch  Many hypoxic events yesterday, no events overnight    Plan:  Monitor for s/s of infection, currently has increased secretions, likely 2/2 to PNA  Plan for bronchoscopy if hypoxic episodes resume    S/P percutaneous endoscopic gastrostomy (PEG) tube placement (HCC)  Assessment & Plan  -Monitor for s/s infection      Depression  Assessment & Plan  Continue fluoxetine    * Sepsis (HCC)  Assessment & Plan  Presented from PCP with hypoxia with desat in 70s and increased WOB, admitted for, Multilobar PNA on dependent vent   Sepsis Criteria meet: due to leukocytosis and tachypnea.  Suspect the source of pneumonia  CT PE chest: multilobar PNA, most severe in [place] MAN[place]EL, aspiration not excluded, bilateral atelectasis, NO PE  Started on Zoysn abx and deescalated to Ceftriaxone on 5/31, switched to Cefepime on 6/2  Blood cultures and Strep/Legionella Urine cultures negative  Sputum cultures resulted with Serratia marcescens, and Pseudomonas aeruginosa.    No results for input(s): \"PROCALCITONI\" in the last 72 hours.    Recent Labs     06/02/24  0454 06/03/24  0446 06/04/24  0442   WBC 7.91 7.94 8.18       Continue Cefepime 2/7  Continue Xopenex, Hypertonic Nebs  Continue Mucinex   Bronchoscopy done on 6/4/24             Disposition: Critical care    ICU Core Measures     Vented Patient  VAP Bundle  VAP bundle ordered     A: Assess, Prevent, and Manage Pain Has pain been assessed? Yes  Need for changes to pain regimen? No   B: Both Spontaneous Awakening Trials (SATs) and Spontaneous Breathing Trials (SBTs) Plan to perform spontaneous awakening trial today? Chronic vent   Plan to perform spontaneous breathing trial today? " Chronic vent   Obvious barriers to extubation? Yes   C: Choice of Sedation RASS Goal: 0 Alert and Calm  Need for changes to sedation or analgesia regimen? No   D: Delirium CAM-ICU: Negative   E: Early Mobility  Plan for early mobility? No   F: Family Engagement Plan for family engagement today? Yes       Antibiotic Review: Patient on appropriate coverage based on culture data.  and Continue broad spectrum secondary to severity of illness.     Review of Invasive Devices:      Central access plan:  Port in place in L chest      Prophylaxis:  VTE VTE covered by:  apixaban, Per PEG Tube, 5 mg at 06/04/24 1705       Stress Ulcer  not ordered         Significant 24hr Events     24hr events: Had an episode of bradycardia and hypoxia requiring one dose of atropine overnight, had to terminate MRI due to this event     Subjective   Review of Systems: See HPI for Review of Systems     Objective                            Vitals I/O      Most Recent Min/Max in 24hrs   Temp 98.5 °F (36.9 °C) Temp  Min: 97.9 °F (36.6 °C)  Max: 100.6 °F (38.1 °C)   Pulse 55 Pulse  Min: 55  Max: 110   Resp 18 Resp  Min: 18  Max: 36   BP 96/60 BP  Min: 88/52  Max: 143/84   O2 Sat 97 % SpO2  Min: 78 %  Max: 100 %      Intake/Output Summary (Last 24 hours) at 6/5/2024 0755  Last data filed at 6/5/2024 0613  Gross per 24 hour   Intake 3740 ml   Output 700 ml   Net 3040 ml       Diet Enteral/Parenteral; Tube Feeding No Oral Diet; Jevity 1.5; Continuous; 70; 200; Water; Every 4 hours    Invasive Monitoring           Physical Exam   Physical Exam  Vitals reviewed.   Eyes:      Extraocular Movements: Extraocular movements intact.      Conjunctiva/sclera: Conjunctivae normal.   Skin:     General: Skin is warm.      Capillary Refill: Capillary refill takes less than 2 seconds.   HENT:      Head: Normocephalic and atraumatic.      Right Ear: Hearing normal.      Left Ear: Hearing normal.      Nose: No nasal deformity or congestion.   Neck:      Trachea:  Tracheostomy present.   Cardiovascular:      Rate and Rhythm: Normal rate and regular rhythm.      Heart sounds: Normal heart sounds.   Musculoskeletal:         General: No swelling.   Abdominal: General: There is no distension.      Palpations: Abdomen is soft.      Tenderness: There is no abdominal tenderness.   Constitutional:       General: He is not in acute distress.     Appearance: He is diaphoretic.      Interventions: He is intubated.   Pulmonary:      Effort: No respiratory distress. He is intubated.      Breath sounds: Rhonchi present.   Neurological:      Mental Status: He is alert. Mental status is at baseline.      Motor: gross motor function is at baseline for patient.      Comments: Continued proximal muscle weakness    Genitourinary/Anorectal:  external catheter present.          Diagnostic Studies      EKG:   Imaging:  I have personally reviewed pertinent reports.   and I have personally reviewed pertinent films in PACS     Medications:  Scheduled PRN   apixaban, 5 mg, BID  cefepime, 2,000 mg, Q12H  chlorhexidine, 15 mL, Q12H ISAEL  FLUoxetine, 10 mg, Daily  guaiFENesin, 200 mg, Q6H  levalbuterol, 1.25 mg, Q6H  sodium chloride, 4 mL, Q6H      atropine, 0.5 mg, Once PRN       Continuous          Labs:    CBC    Recent Labs     06/04/24  0442 06/05/24  0425   WBC 8.18 8.70   HGB 12.0 11.6*   HCT 36.9 36.2*    195     BMP    Recent Labs     06/04/24  0442 06/05/24  0425   SODIUM 136 136   K 4.0 3.9    102   CO2 27 27   AGAP 7 7   BUN 11 11   CREATININE 0.57* 0.49*   CALCIUM 9.1 8.8       Coags    No recent results     Additional Electrolytes  No recent results       Blood Gas    Recent Labs     06/04/24  1300   PHART 7.404   GGJ6DAU 37.7   PO2ART 67.0*   BOH8PDZ 23.0   BEART -1.3   SOURCE Brachial, Left     Recent Labs     06/04/24  1300   SOURCE Brachial, Left    LFTs  No recent results    Infectious  No recent results  Glucose  Recent Labs     06/04/24  0442 06/05/24  0425   GLUC 105 104                Rachel Sharma MD

## 2024-06-05 NOTE — RESPIRATORY THERAPY NOTE
Assisted in bedside bronch. 2 mL 1% lidocaine given. Pt preoxygenated with 100% fio2. Pt needed to be bagged 4 times throughout procedure to bring spo2 back to 90s. Moderate amount of thin mucus suctioned out via bronchscope. Per attending no samples sent to lab. Pt tolerated procedure fairly well.

## 2024-06-05 NOTE — NURSING NOTE
Pt tolerated laying flat in ICU.  Taken to MRI with RN, RT, PCA via monitored ICU bed.  Upon laying flat and moving side to side to pull to MRI table, pt noted to become bradycardic to the 20's, cyanotic, tremors.  Atropine admin (see MAR).  HR improved to 150's, color returned, tremor subsided.  Aborted attempt of MRI and patient returned to ICU room.

## 2024-06-05 NOTE — PLAN OF CARE
Problem: Potential for Falls  Goal: Patient will remain free of falls  Description: INTERVENTIONS:  - Educate patient/family on patient safety including physical limitations  - Instruct patient to call for assistance with activity   - Consult OT/PT to assist with strengthening/mobility   - Keep Call bell within reach  - Keep bed low and locked with side rails adjusted as appropriate  - Keep care items and personal belongings within reach  - Initiate and maintain comfort rounds  - Make Fall Risk Sign visible to staff  - Offer Toileting every 2 Hours, in advance of need  - Initiate/Maintain bed alarm  - Obtain necessary fall risk management equipment  - Apply yellow socks and bracelet for high fall risk patients  - Consider moving patient to room near nurses station  Outcome: Progressing     Problem: Prexisting or High Potential for Compromised Skin Integrity  Goal: Skin integrity is maintained or improved  Description: INTERVENTIONS:  - Identify patients at risk for skin breakdown  - Assess and monitor skin integrity  - Assess and monitor nutrition and hydration status  - Monitor labs   - Assess for incontinence   - Turn and reposition patient  - Assist with mobility/ambulation  - Relieve pressure over bony prominences  - Avoid friction and shearing  - Provide appropriate hygiene as needed including keeping skin clean and dry  - Evaluate need for skin moisturizer/barrier cream  - Collaborate with interdisciplinary team   - Patient/family teaching  - Consider wound care consult   Outcome: Progressing     Problem: Nutrition/Hydration-ADULT  Goal: Nutrient/Hydration intake appropriate for improving, restoring or maintaining nutritional needs  Description: Monitor and assess patient's nutrition/hydration status for malnutrition. Collaborate with interdisciplinary team and initiate plan and interventions as ordered.  Monitor patient's weight and dietary intake as ordered or per policy. Utilize nutrition screening tool  and intervene as necessary. Determine patient's food preferences and provide high-protein, high-caloric foods as appropriate.     INTERVENTIONS:  - Monitor oral intake, urinary output, labs, and treatment plans  - Assess nutrition and hydration status and recommend course of action  - Evaluate amount of meals eaten  - Assist patient with eating if necessary   - Allow adequate time for meals  - Recommend/ encourage appropriate diets, oral nutritional supplements, and vitamin/mineral supplements  - Order, calculate, and assess calorie counts as needed  - Recommend, monitor, and adjust tube feedings and TPN/PPN based on assessed needs  - Assess need for intravenous fluids  - Provide specific nutrition/hydration education as appropriate  - Include patient/family/caregiver in decisions related to nutrition  Outcome: Progressing     Problem: PAIN - ADULT  Goal: Verbalizes/displays adequate comfort level or baseline comfort level  Description: Interventions:  - Encourage patient to monitor pain and request assistance  - Assess pain using appropriate pain scale  - Administer analgesics based on type and severity of pain and evaluate response  - Implement non-pharmacological measures as appropriate and evaluate response  - Consider cultural and social influences on pain and pain management  - Notify physician/advanced practitioner if interventions unsuccessful or patient reports new pain  Outcome: Progressing     Problem: INFECTION - ADULT  Goal: Absence or prevention of progression during hospitalization  Description: INTERVENTIONS:  - Assess and monitor for signs and symptoms of infection  - Monitor lab/diagnostic results  - Monitor all insertion sites, i.e. indwelling lines, tubes, and drains  - Monitor endotracheal if appropriate and nasal secretions for changes in amount and color  - [place] appropriate cooling/warming therapies per order  - Administer medications as ordered  - Instruct and encourage patient and  family to use good hand hygiene technique  - Identify and instruct in appropriate isolation precautions for identified infection/condition  Outcome: Progressing  Goal: Absence of fever/infection during neutropenic period  Description: INTERVENTIONS:  - Monitor WBC    Outcome: Progressing     Problem: DISCHARGE PLANNING  Goal: Discharge to home or other facility with appropriate resources  Description: INTERVENTIONS:  - Identify barriers to discharge w/patient and caregiver  - Arrange for needed discharge resources and transportation as appropriate  - Identify discharge learning needs (meds, wound care, etc.)  - Arrange for interpretive services to assist at discharge as needed  - Refer to Case Management Department for coordinating discharge planning if the patient needs post-hospital services based on physician/advanced practitioner order or complex needs related to functional status, cognitive ability, or social support system  Outcome: Progressing     Problem: Knowledge Deficit  Goal: Patient/family/caregiver demonstrates understanding of disease process, treatment plan, medications, and discharge instructions  Description: Complete learning assessment and assess knowledge base.  Interventions:  - Provide teaching at level of understanding  - Provide teaching via preferred learning methods  Outcome: Progressing

## 2024-06-05 NOTE — DISCHARGE INSTR - OTHER ORDERS
Skin care Plan:  1-Protect sacrum w/Silicone foam, alex with P, change q3d and PRN, check skin q-shift.  2-Turn/reposition q2h or when medically stable for pressure re-distribution on skin.  3-Elevate heels to offload pressure.  4-Moisturize skin daily with skin nourishing cream.  5-Ehob cushion in chair when out of bed.  6-Hydraguard to bilateral heels BID and PRN.  7-Left ear: Irrigate with NSS. Pat dry. Cover wound bed with silver alginate. Apply 2x2 Silicone foam dressing to ear. Change  every other day

## 2024-06-05 NOTE — PHYSICAL THERAPY NOTE
Physical Therapy Cancellation Note     06/05/24 1022   Note Type   Note type Cancelled Session   Cancel Reasons Medical status   Additional Comments PT consult remains active, however per ICU mobility rounds, pt is not appropriate for participation in evaluation. Due to multiple medical cancels, will d/c current PT orders. Please reconsult once pt becomes more medically appropriate to participate.       Namrata Blas, PT, DPT   Available via [employer]  NPI # [id number]  [place] License - [id number]  6/5/2024

## 2024-06-05 NOTE — RESPIRATORY THERAPY NOTE
06/05/24 1415   Respiratory Assessment   Resp Comments Resposnded to vent alarm. PT VT was reading 46 and pt's lips were blue and he was starting to Desat. Sx for large amount white copiuos secretions. Took of vent when sat didn't come up post sx and bagged. Lavaged and sx more sections. Sat increased to 100% and then pt placed back on vent

## 2024-06-05 NOTE — NURSING NOTE
Unable to tolerate turning, patient becomes bradycardic and in resp. distress. Req resp to bag patient and lavage multiple times. CC team made aware and at bedside.

## 2024-06-06 PROBLEM — F41.9 ANXIETY: Chronic | Status: ACTIVE | Noted: 2023-09-06

## 2024-06-06 PROBLEM — G62.89 MIXED AXONAL-DEMYELINATING POLYNEUROPATHY: Chronic | Status: ACTIVE | Noted: 2023-10-18

## 2024-06-06 PROBLEM — J18.1 MULTIFOCAL LUNG CONSOLIDATION (HCC): Status: RESOLVED | Noted: 2023-09-24 | Resolved: 2024-06-06

## 2024-06-06 LAB
ANION GAP SERPL CALCULATED.3IONS-SCNC: 6 MMOL/L (ref 4–13)
BASOPHILS # BLD AUTO: 0.05 THOUSANDS/ÂΜL (ref 0–0.1)
BASOPHILS NFR BLD AUTO: 1 % (ref 0–1)
BUN SERPL-MCNC: 13 MG/DL (ref 5–25)
CA-I BLD-SCNC: 1.15 MMOL/L (ref 1.12–1.32)
CALCIUM SERPL-MCNC: 9.4 MG/DL (ref 8.4–10.2)
CHLORIDE SERPL-SCNC: 99 MMOL/L (ref 96–108)
CO2 SERPL-SCNC: 31 MMOL/L (ref 21–32)
CREAT SERPL-MCNC: 0.58 MG/DL (ref 0.6–1.3)
EOSINOPHIL # BLD AUTO: 0.12 THOUSAND/ÂΜL (ref 0–0.61)
EOSINOPHIL NFR BLD AUTO: 1 % (ref 0–6)
ERYTHROCYTE [DISTWIDTH] IN BLOOD BY AUTOMATED COUNT: 15.9 % (ref 11.6–15.1)
GFR SERPL CREATININE-BSD FRML MDRD: 130 ML/MIN/1.73SQ M
GLUCOSE SERPL-MCNC: 101 MG/DL (ref 65–140)
HCT VFR BLD AUTO: 38.4 % (ref 36.5–49.3)
HGB BLD-MCNC: 12.4 G/DL (ref 12–17)
IMM GRANULOCYTES # BLD AUTO: 0.06 THOUSAND/UL (ref 0–0.2)
IMM GRANULOCYTES NFR BLD AUTO: 1 % (ref 0–2)
LYMPHOCYTES # BLD AUTO: 1.3 THOUSANDS/ÂΜL (ref 0.6–4.47)
LYMPHOCYTES NFR BLD AUTO: 13 % (ref 14–44)
MAGNESIUM SERPL-MCNC: 2.2 MG/DL (ref 1.9–2.7)
MCH RBC QN AUTO: 29.4 PG (ref 26.8–34.3)
MCHC RBC AUTO-ENTMCNC: 32.3 G/DL (ref 31.4–37.4)
MCV RBC AUTO: 91 FL (ref 82–98)
MONOCYTES # BLD AUTO: 0.79 THOUSAND/ÂΜL (ref 0.17–1.22)
MONOCYTES NFR BLD AUTO: 8 % (ref 4–12)
NEUTROPHILS # BLD AUTO: 7.94 THOUSANDS/ÂΜL (ref 1.85–7.62)
NEUTS SEG NFR BLD AUTO: 76 % (ref 43–75)
NRBC BLD AUTO-RTO: 0 /100 WBCS
PHOSPHATE SERPL-MCNC: 3.5 MG/DL (ref 2.7–4.5)
PLATELET # BLD AUTO: 227 THOUSANDS/UL (ref 149–390)
PMV BLD AUTO: 9.8 FL (ref 8.9–12.7)
POTASSIUM SERPL-SCNC: 3.9 MMOL/L (ref 3.5–5.3)
RBC # BLD AUTO: 4.22 MILLION/UL (ref 3.88–5.62)
SODIUM SERPL-SCNC: 136 MMOL/L (ref 135–147)
WBC # BLD AUTO: 10.26 THOUSAND/UL (ref 4.31–10.16)

## 2024-06-06 PROCEDURE — 94003 VENT MGMT INPAT SUBQ DAY: CPT

## 2024-06-06 PROCEDURE — 94150 VITAL CAPACITY TEST: CPT

## 2024-06-06 PROCEDURE — 94669 MECHANICAL CHEST WALL OSCILL: CPT

## 2024-06-06 PROCEDURE — 80048 BASIC METABOLIC PNL TOTAL CA: CPT

## 2024-06-06 PROCEDURE — 85025 COMPLETE CBC W/AUTO DIFF WBC: CPT

## 2024-06-06 PROCEDURE — 84100 ASSAY OF PHOSPHORUS: CPT

## 2024-06-06 PROCEDURE — 94640 AIRWAY INHALATION TREATMENT: CPT

## 2024-06-06 PROCEDURE — 83735 ASSAY OF MAGNESIUM: CPT

## 2024-06-06 PROCEDURE — 82330 ASSAY OF CALCIUM: CPT

## 2024-06-06 PROCEDURE — 99291 CRITICAL CARE FIRST HOUR: CPT | Performed by: INTERNAL MEDICINE

## 2024-06-06 PROCEDURE — 94664 DEMO&/EVAL PT USE INHALER: CPT

## 2024-06-06 PROCEDURE — 94760 N-INVAS EAR/PLS OXIMETRY 1: CPT

## 2024-06-06 PROCEDURE — 94002 VENT MGMT INPAT INIT DAY: CPT

## 2024-06-06 RX ORDER — SCOLOPAMINE TRANSDERMAL SYSTEM 1 MG/1
1 PATCH, EXTENDED RELEASE TRANSDERMAL
Status: DISCONTINUED | OUTPATIENT
Start: 2024-06-06 | End: 2024-06-10 | Stop reason: HOSPADM

## 2024-06-06 RX ORDER — FUROSEMIDE 10 MG/ML
40 INJECTION INTRAMUSCULAR; INTRAVENOUS ONCE
Status: COMPLETED | OUTPATIENT
Start: 2024-06-06 | End: 2024-06-06

## 2024-06-06 RX ADMIN — SCOPOLAMINE 1 PATCH: 1.5 PATCH, EXTENDED RELEASE TRANSDERMAL at 10:12

## 2024-06-06 RX ADMIN — CEFEPIME 2000 MG: 2 INJECTION, POWDER, FOR SOLUTION INTRAVENOUS at 20:08

## 2024-06-06 RX ADMIN — GUAIFENESIN 200 MG: 200 SOLUTION ORAL at 20:25

## 2024-06-06 RX ADMIN — CEFEPIME 2000 MG: 2 INJECTION, POWDER, FOR SOLUTION INTRAVENOUS at 03:23

## 2024-06-06 RX ADMIN — GUAIFENESIN 200 MG: 200 SOLUTION ORAL at 08:32

## 2024-06-06 RX ADMIN — FLUOXETINE 10 MG: 10 CAPSULE ORAL at 08:01

## 2024-06-06 RX ADMIN — LEVALBUTEROL HYDROCHLORIDE 1.25 MG: 1.25 SOLUTION RESPIRATORY (INHALATION) at 08:41

## 2024-06-06 RX ADMIN — APIXABAN 5 MG: 5 TABLET, FILM COATED ORAL at 17:07

## 2024-06-06 RX ADMIN — LEVALBUTEROL HYDROCHLORIDE 1.25 MG: 1.25 SOLUTION RESPIRATORY (INHALATION) at 14:13

## 2024-06-06 RX ADMIN — GUAIFENESIN 200 MG: 200 SOLUTION ORAL at 03:23

## 2024-06-06 RX ADMIN — LEVALBUTEROL HYDROCHLORIDE 1.25 MG: 1.25 SOLUTION RESPIRATORY (INHALATION) at 04:58

## 2024-06-06 RX ADMIN — FUROSEMIDE 40 MG: 10 INJECTION, SOLUTION INTRAMUSCULAR; INTRAVENOUS at 07:56

## 2024-06-06 RX ADMIN — CEFEPIME 2000 MG: 2 INJECTION, POWDER, FOR SOLUTION INTRAVENOUS at 12:44

## 2024-06-06 RX ADMIN — CHLORHEXIDINE GLUCONATE 15 ML: 1.2 RINSE ORAL at 20:25

## 2024-06-06 RX ADMIN — GUAIFENESIN 200 MG: 200 SOLUTION ORAL at 17:07

## 2024-06-06 RX ADMIN — LEVALBUTEROL HYDROCHLORIDE 1.25 MG: 1.25 SOLUTION RESPIRATORY (INHALATION) at 19:50

## 2024-06-06 RX ADMIN — CHLORHEXIDINE GLUCONATE 15 ML: 1.2 RINSE ORAL at 08:00

## 2024-06-06 RX ADMIN — APIXABAN 5 MG: 5 TABLET, FILM COATED ORAL at 08:00

## 2024-06-06 RX ADMIN — FUROSEMIDE 40 MG: 10 INJECTION, SOLUTION INTRAMUSCULAR; INTRAVENOUS at 17:07

## 2024-06-06 NOTE — NURSING NOTE
Elian/hypoxia episode. Suctioning and bagging pt with resp. Pt having seizure like activity and looked uncomfortable. Pt taking awhile to recover sat's. Resident Oliva and KIRSTIE brought to bedside again. Started bagging the patient again to help him recover.

## 2024-06-06 NOTE — PROGRESS NOTES
[facility]  Progress Note  Name: Hemanth Swanson I  MRN: [id number]  Unit/Bed#: ICU 12 I Date of Admission: 5/30/2024   Date of Service: 6/6/2024 I Hospital Day: 7    Assessment & Plan   Seizure-like activity (HCC)  Assessment & Plan  2x seizure activity, went bradycardic and hypoxic, unsure if seizure activity causes bradycardia/hypoxia or hypoxia/bradycardia causes seizure activity  05/31-06/01 overnight, pulseless asystole without hypoxia, s/p 2 x of epi, bicarb, and calcium, PEA on monitor, ROSC achieved with clearance of secretions, followed byseizure like activity, ativan 2mg given, seizure activity aborted  06/02-brooke to 35, hypoxic to 50%, suctioned, noted to have seizure-like activity, 2mg Ativan given   EEG resulted as normal, activity believed to be due to hypoxia    Plan:  Determined to not be seizure activity, manage secretions and try to keep oxygen sats above 90%.    Bradycardia  Assessment & Plan  Concern for increased vagal stimulation resulting hypoxic bradycardia 2/2 to increased secretions and position of trach with mucus plugging  Patient has had multiple episodes of bradycardia into 20-30s due to increased trach secretions with mucus plugging, worsening depended on trach position, thought to stimulate vagal bradycardia. Of note on 5/31, 1x episode resulted in  pulseless asystole without hypoxia, s/p 2 x of epi, bicarb, and calcium, PEA on monitor, ROSC achieved with clearance of secretions.   S/p tracheostomy in 9/2023 at [facility], initially 8.0 cuffed,downsized to a Shiley 4 then capping trial.  Several days into the capping trial he developed aspiration pneumonia and ended up being hospitalized at [facility] with a upsized tracheostomy to 8.0, he had a bronchoscopy performed and required full ventilator support at that time.  Trach exchanged to 6.0 cuffed on 3/18/2024  Home Vent A/ VC withTV 550, RR 24, MV 12-14 L, 100% spontaneous triggers  S/p Bronch  on 06/01, exchanged Trach for 6XL during bronch, no further bradycardic events occurred throughout the day  06/02-brooek to 35, hypoxic to 50%, suctioned, noted to have seizure-like activity, 2mg Ativan given   Appreciate neurology consults, shaking believed to be 2/2 hypoxia, no findings on EEG indicating seizure activity.  Bronchoscopy done 6/4, and 6/5 without significant benefit to secretions    Plan:  Continue managing secretions.    Neuromuscular disorder ([facility])  Assessment & Plan  Hx of Testicular CA, S/P 3 cycles CDDP/ ,  then developed neuromuscular syndrome including urinary incontinence and resp failure resulting in vent-dependent, has not established with Neuro  Neurology outpatient on 7/17    Ventilator dependent ([facility])  Assessment & Plan  Hx of Testicular CA, S/P 3 cycles CDDP/ ,  then developed neuromuscular syndrome including urinary incontinence and resp failure resulting in vent-dependent, has not established with Neuro  History of anoxic brain injury and unknown neuromuscular disorder  S/p tracheostomy in 9/2023 at [facility], initially 8.0 cuffed,downsized to a Shiley 4 then capping trial.  Several days into the capping trial he developed aspiration pneumonia and ended up being hospitalized at [facility] with a upsized tracheostomy to 8.0, he had a bronchoscopy performed and required full ventilator support at that time.  Trach exchanged to 6.0 cuffed on 3/18/2024  Ventilator dependent 24/7  home vent settings: 18/500/40%/6  Current Vent settings: 18/450/40%/6      Impaired physical mobility  Assessment & Plan  -PT/OT for have signed off for now, possibly due to hypoxic episodes (reasoning unclear from notes)    Status post tracheostomy ([facility])  Assessment & Plan  S/p tracheostomy in 9/2023 at [facility], initially 8.0 cuffed,downsized to a Shiley 4 then capping trial.  Several days into the capping trial he developed aspiration pneumonia and ended up being hospitalized at [facility] with a upsized tracheostomy to 8.0, he had a bronchoscopy performed and required full ventilator support at that time.  Trach exchanged to 6.0 cuffed on 3/18/2024  Home Vent A/ VC withTV 550, RR 24, MV 12-14 L, 100% spontaneous triggers  Concern for increased vagal stimulation resulting hypoxic bradycardia 2/2 to increased secretions and position of trach with mucus plugging  Current Trach is a  6XL   Several bronchs were performed between 6/1-6/5 with no significant relief of secretions   Many hypoxic events yesterday, no events overnight    Plan:  Monitor for s/s of infection, currently has increased secretions, likely 2/2 to PNA  Plan for bronchoscopy if hypoxic episodes resume    S/P percutaneous endoscopic gastrostomy (PEG) tube placement ([facility])  Assessment & Plan  -Monitor for s/s infection      * Sepsis ([facility])  Assessment & Plan  Presented from PCP with hypoxia with desat in 70s and increased WOB, admitted for, Multilobar PNA on dependent vent   Sepsis Criteria meet: due to leukocytosis and tachypnea.  Suspect the source of pneumonia  CT PE chest: multilobar PNA, most severe in [place], aspiration not excluded, bilateral atelectasis, NO PE  Started on Zoysn abx and deescalated to Ceftriaxone on 5/31, switched to Cefepime on 6/2  Blood cultures and Strep/Legionella Urine cultures negative  Sputum cultures resulted with Serratia marcescens, and Pseudomonas aeruginosa.    No results for input(s): \"PROCALCITONI\" in the last 72 hours.    Recent Labs     06/04/24  0442 06/05/24  0425 06/06/24  0439   WBC 8.18 8.70 10.26*       Continue Cefepime 3/7  Continue Xopenex, Hypertonic Nebs  Continue Mucinex              Disposition: Critical care    ICU Core Measures     Vented Patient  VAP Bundle  VAP bundle ordered     A: Assess, Prevent, and Manage Pain Has pain been assessed? Yes  Need for changes to pain regimen? No   B: Both Spontaneous Awakening Trials (SATs) and Spontaneous Breathing Trials (SBTs) Plan to perform " spontaneous awakening trial today? Chronic vent   Plan to perform spontaneous breathing trial today? Chronic vent   Obvious barriers to extubation? Yes   C: Choice of Sedation RASS Goal: 0 Alert and Calm  Need for changes to sedation or analgesia regimen? No   D: Delirium CAM-ICU: Negative   E: Early Mobility  Plan for early mobility? No   F: Family Engagement Plan for family engagement today? Yes       Antibiotic Review: Patient on appropriate coverage based on culture data.     Review of Invasive Devices:      Central access plan:       Prophylaxis:  VTE VTE covered by:  apixaban, Per PEG Tube, 5 mg at 06/06/24 0800       Stress Ulcer  not ordered         Significant 24hr Events     24hr events:      Subjective   Review of Systems: See HPI for Review of Systems     Objective                            Vitals I/O      Most Recent Min/Max in 24hrs   Temp 99 °F (37.2 °C) Temp  Min: 98.5 °F (36.9 °C)  Max: 99 °F (37.2 °C)   Pulse 83 Pulse  Min: 46  Max: 90   Resp 20 Resp  Min: 12  Max: 34   /77 BP  Min: 93/57  Max: 151/86   O2 Sat 97 % SpO2  Min: 77 %  Max: 100 %      Intake/Output Summary (Last 24 hours) at 6/6/2024 1114  Last data filed at 6/6/2024 1000  Gross per 24 hour   Intake 2461 ml   Output 2950 ml   Net -489 ml       Diet Enteral/Parenteral; Tube Feeding No Oral Diet; Jevity 1.5; Continuous; 70; 200; Water; Every 4 hours    Invasive Monitoring           Physical Exam   Physical Exam  Vitals and nursing note reviewed.   Skin:     General: Skin is warm.   HENT:      Head: Normocephalic and atraumatic.      Right Ear: Hearing normal.      Left Ear: Hearing normal.      Nose: No congestion.   Neck:      Trachea: Tracheostomy present.   Cardiovascular:      Rate and Rhythm: Normal rate and regular rhythm.      Heart sounds: Normal heart sounds.   Musculoskeletal:      Right lower leg: No edema.      Left lower leg: No edema.   Abdominal: General: There is abdominal type feeding tube.There is no distension.       Palpations: Abdomen is soft.      Tenderness: There is no abdominal tenderness.      Hernia: A hernia is present.   Constitutional:       Appearance: He is diaphoretic.   Pulmonary:      Breath sounds: Rhonchi (Less intense compared to yesterday) present.   Neurological:      Mental Status: He is alert. Mental status is at baseline. He is calm.   Genitourinary/Anorectal:  Sheppard present.          Diagnostic Studies      EKG:   Imaging:  I have personally reviewed pertinent reports.   and I have personally reviewed pertinent films in PACS     Medications:  Scheduled PRN   apixaban, 5 mg, BID  cefepime, 2,000 mg, Q8H  chlorhexidine, 15 mL, Q12H ISAEL  FLUoxetine, 10 mg, Daily  guaiFENesin, 200 mg, Q6H  levalbuterol, 1.25 mg, Q6H  scopolamine, 1 patch, Q72H      atropine, 0.5 mg, Once PRN       Continuous          Labs:    CBC    Recent Labs     06/05/24 0425 06/06/24  0439   WBC 8.70 10.26*   HGB 11.6* 12.4   HCT 36.2* 38.4    227     BMP    Recent Labs     06/05/24  0425 06/06/24  0439   SODIUM 136 136   K 3.9 3.9    99   CO2 27 31   AGAP 7 6   BUN 11 13   CREATININE 0.49* 0.58*   CALCIUM 8.8 9.4       Coags    No recent results     Additional Electrolytes  Recent Labs     06/06/24  0439   MG 2.2   PHOS 3.5   CAIONIZED 1.15          Blood Gas    Recent Labs     06/04/24  1300   PHART 7.404   ALP3EPA 37.7   PO2ART 67.0*   QAR3ZVG 23.0   BEART -1.3   SOURCE Brachial, Left     Recent Labs     06/04/24  1300   SOURCE Brachial, Left    LFTs  No recent results    Infectious  No recent results  Glucose  Recent Labs     06/05/24  0425 06/06/24  0439   GLUC 104 101               Rachel Sharma MD

## 2024-06-06 NOTE — ASSESSMENT & PLAN NOTE
Hx of Testicular CA, S/P 3 cycles CDDP/ ,  then developed neuromuscular syndrome including urinary incontinence and resp failure resulting in vent-dependent, has not established with Neuro  History of anoxic brain injury and unknown neuromuscular disorder  S/p tracheostomy in 9/2023 at [facility], initially 8.0 cuffed,downsized to a Shiley 4 then capping trial.  Several days into the capping trial he developed aspiration pneumonia and ended up being hospitalized at [facility] with a upsized tracheostomy to 8.0, he had a bronchoscopy performed and required full ventilator support at that time.  Trach exchanged to 6.0 cuffed on 3/18/2024  Ventilator dependent 24/7  home vent settings: 18/500/40%/6  Current Vent settings: 18/450/40%/6

## 2024-06-06 NOTE — ASSESSMENT & PLAN NOTE
-PT/OT for have signed off for now, possibly due to hypoxic episodes (reasoning unclear from notes)

## 2024-06-06 NOTE — ASSESSMENT & PLAN NOTE
"Presented from PCP with hypoxia with desat in 70s and increased WOB, admitted for, Multilobar PNA on dependent vent   Sepsis Criteria meet: due to leukocytosis and tachypnea.  Suspect the source of pneumonia  CT PE chest: multilobar PNA, most severe in [place] MAN[place], aspiration not excluded, bilateral atelectasis, NO PE  Started on Zoysn abx and deescalated to Ceftriaxone on 5/31, switched to Cefepime on 6/2  Blood cultures and Strep/Legionella Urine cultures negative  Sputum cultures resulted with Serratia marcescens, and Pseudomonas aeruginosa.    No results for input(s): \"PROCALCITONI\" in the last 72 hours.    Recent Labs     06/04/24  0442 06/05/24  0425 06/06/24  0439   WBC 8.18 8.70 10.26*       Continue Cefepime 3/7  Continue Xopenex, Hypertonic Nebs  Continue Mucinex   "

## 2024-06-06 NOTE — PLAN OF CARE
Problem: Potential for Falls  Goal: Patient will remain free of falls  Description: INTERVENTIONS:  - Educate patient/family on patient safety including physical limitations  - Instruct patient to call for assistance with activity   - Consult OT/PT to assist with strengthening/mobility   - Keep Call bell within reach  - Keep bed low and locked with side rails adjusted as appropriate  - Keep care items and personal belongings within reach  - Initiate and maintain comfort rounds  - Make Fall Risk Sign visible to staff  - Offer Toileting every 2 Hours, in advance of need  - Initiate/Maintain bed alarm  - Obtain necessary fall risk management equipment  - Apply yellow socks and bracelet for high fall risk patients  - Consider moving patient to room near nurses station  Outcome: Progressing     Problem: Prexisting or High Potential for Compromised Skin Integrity  Goal: Skin integrity is maintained or improved  Description: INTERVENTIONS:  - Identify patients at risk for skin breakdown  - Assess and monitor skin integrity  - Assess and monitor nutrition and hydration status  - Monitor labs   - Assess for incontinence   - Turn and reposition patient  - Assist with mobility/ambulation  - Relieve pressure over bony prominences  - Avoid friction and shearing  - Provide appropriate hygiene as needed including keeping skin clean and dry  - Evaluate need for skin moisturizer/barrier cream  - Collaborate with interdisciplinary team   - Patient/family teaching  - Consider wound care consult   Outcome: Progressing     Problem: Nutrition/Hydration-ADULT  Goal: Nutrient/Hydration intake appropriate for improving, restoring or maintaining nutritional needs  Description: Monitor and assess patient's nutrition/hydration status for malnutrition. Collaborate with interdisciplinary team and initiate plan and interventions as ordered.  Monitor patient's weight and dietary intake as ordered or per policy. Utilize nutrition screening tool  and intervene as necessary. Determine patient's food preferences and provide high-protein, high-caloric foods as appropriate.     INTERVENTIONS:  - Monitor oral intake, urinary output, labs, and treatment plans  - Assess nutrition and hydration status and recommend course of action  - Evaluate amount of meals eaten  - Assist patient with eating if necessary   - Allow adequate time for meals  - Recommend/ encourage appropriate diets, oral nutritional supplements, and vitamin/mineral supplements  - Order, calculate, and assess calorie counts as needed  - Recommend, monitor, and adjust tube feedings and TPN/PPN based on assessed needs  - Assess need for intravenous fluids  - Provide specific nutrition/hydration education as appropriate  - Include patient/family/caregiver in decisions related to nutrition  Outcome: Progressing     Problem: PAIN - ADULT  Goal: Verbalizes/displays adequate comfort level or baseline comfort level  Description: Interventions:  - Encourage patient to monitor pain and request assistance  - Assess pain using appropriate pain scale  - Administer analgesics based on type and severity of pain and evaluate response  - Implement non-pharmacological measures as appropriate and evaluate response  - Consider cultural and social influences on pain and pain management  - Notify physician/advanced practitioner if interventions unsuccessful or patient reports new pain  Outcome: Progressing     Problem: INFECTION - ADULT  Goal: Absence or prevention of progression during hospitalization  Description: INTERVENTIONS:  - Assess and monitor for signs and symptoms of infection  - Monitor lab/diagnostic results  - Monitor all insertion sites, i.e. indwelling lines, tubes, and drains  - Monitor endotracheal if appropriate and nasal secretions for changes in amount and color  - [place] appropriate cooling/warming therapies per order  - Administer medications as ordered  - Instruct and encourage patient and  family to use good hand hygiene technique  - Identify and instruct in appropriate isolation precautions for identified infection/condition  Outcome: Progressing  Goal: Absence of fever/infection during neutropenic period  Description: INTERVENTIONS:  - Monitor WBC    Outcome: Progressing     Problem: DISCHARGE PLANNING  Goal: Discharge to home or other facility with appropriate resources  Description: INTERVENTIONS:  - Identify barriers to discharge w/patient and caregiver  - Arrange for needed discharge resources and transportation as appropriate  - Identify discharge learning needs (meds, wound care, etc.)  - Arrange for interpretive services to assist at discharge as needed  - Refer to Case Management Department for coordinating discharge planning if the patient needs post-hospital services based on physician/advanced practitioner order or complex needs related to functional status, cognitive ability, or social support system  Outcome: Progressing     Problem: Knowledge Deficit  Goal: Patient/family/caregiver demonstrates understanding of disease process, treatment plan, medications, and discharge instructions  Description: Complete learning assessment and assess knowledge base.  Interventions:  - Provide teaching at level of understanding  - Provide teaching via preferred learning methods  Outcome: Progressing

## 2024-06-06 NOTE — ASSESSMENT & PLAN NOTE
S/p tracheostomy in 9/2023 at [facility], initially 8.0 cuffed,downsized to a Shiley 4 then capping trial.  Several days into the capping trial he developed aspiration pneumonia and ended up being hospitalized at [facility] with a upsized tracheostomy to 8.0, he had a bronchoscopy performed and required full ventilator support at that time.  Trach exchanged to 6.0 cuffed on 3/18/2024  Home Vent A/ VC withTV 550, RR 24, MV 12-14 L, 100% spontaneous triggers  Concern for increased vagal stimulation resulting hypoxic bradycardia 2/2 to increased secretions and position of trach with mucus plugging  Current Trach is a  6XL   Several bronchs were performed between 6/1-6/5 with no significant relief of secretions   Many hypoxic events yesterday, no events overnight    Plan:  Monitor for s/s of infection, currently has increased secretions, likely 2/2 to PNA  Plan for bronchoscopy if hypoxic episodes resume

## 2024-06-06 NOTE — ASSESSMENT & PLAN NOTE
Concern for increased vagal stimulation resulting hypoxic bradycardia 2/2 to increased secretions and position of trach with mucus plugging  Patient has had multiple episodes of bradycardia into 20-30s due to increased trach secretions with mucus plugging, worsening depended on trach position, thought to stimulate vagal bradycardia. Of note on 5/31, 1x episode resulted in  pulseless asystole without hypoxia, s/p 2 x of epi, bicarb, and calcium, PEA on monitor, ROSC achieved with clearance of secretions.   S/p tracheostomy in 9/2023 at [facility], initially 8.0 cuffed,downsized to a Shiley 4 then capping trial.  Several days into the capping trial he developed aspiration pneumonia and ended up being hospitalized at [facility] with a upsized tracheostomy to 8.0, he had a bronchoscopy performed and required full ventilator support at that time.  Trach exchanged to 6.0 cuffed on 3/18/2024  Home Vent A/ VC withTV 550, RR 24, MV 12-14 L, 100% spontaneous triggers  S/p Bronch on 06/01, exchanged Trach for 6XL during bronch, no further bradycardic events occurred throughout the day  06/02-brooke to 35, hypoxic to 50%, suctioned, noted to have seizure-like activity, 2mg Ativan given   Appreciate neurology consults, shaking believed to be 2/2 hypoxia, no findings on EEG indicating seizure activity.  Bronchoscopy done 6/4, and 6/5 without significant benefit to secretions    Plan:  Continue managing secretions.

## 2024-06-06 NOTE — NURSING NOTE
Bradycardic/hypoxic episode. Required bagging at bedside with resp. Providers aware. Spoke with Dr. BATISTA About bringing pt to MRI and she requested to hold MRI for now.

## 2024-06-07 ENCOUNTER — APPOINTMENT (INPATIENT)
Dept: RADIOLOGY | Facility: HOSPITAL | Age: 39
DRG: 720 | End: 2024-06-07
Payer: COMMERCIAL

## 2024-06-07 LAB
ANION GAP SERPL CALCULATED.3IONS-SCNC: 7 MMOL/L (ref 4–13)
BASOPHILS # BLD AUTO: 0.07 THOUSANDS/ÂΜL (ref 0–0.1)
BASOPHILS NFR BLD AUTO: 1 % (ref 0–1)
BUN SERPL-MCNC: 16 MG/DL (ref 5–25)
CALCIUM SERPL-MCNC: 9.6 MG/DL (ref 8.4–10.2)
CHLORIDE SERPL-SCNC: 95 MMOL/L (ref 96–108)
CO2 SERPL-SCNC: 33 MMOL/L (ref 21–32)
CREAT SERPL-MCNC: 0.6 MG/DL (ref 0.6–1.3)
EOSINOPHIL # BLD AUTO: 0.06 THOUSAND/ÂΜL (ref 0–0.61)
EOSINOPHIL NFR BLD AUTO: 1 % (ref 0–6)
ERYTHROCYTE [DISTWIDTH] IN BLOOD BY AUTOMATED COUNT: 15.9 % (ref 11.6–15.1)
GFR SERPL CREATININE-BSD FRML MDRD: 128 ML/MIN/1.73SQ M
GLUCOSE SERPL-MCNC: 128 MG/DL (ref 65–140)
HCT VFR BLD AUTO: 40.7 % (ref 36.5–49.3)
HGB BLD-MCNC: 13.2 G/DL (ref 12–17)
IMM GRANULOCYTES # BLD AUTO: 0.06 THOUSAND/UL (ref 0–0.2)
IMM GRANULOCYTES NFR BLD AUTO: 1 % (ref 0–2)
LYMPHOCYTES # BLD AUTO: 1.14 THOUSANDS/ÂΜL (ref 0.6–4.47)
LYMPHOCYTES NFR BLD AUTO: 10 % (ref 14–44)
MCH RBC QN AUTO: 29.3 PG (ref 26.8–34.3)
MCHC RBC AUTO-ENTMCNC: 32.4 G/DL (ref 31.4–37.4)
MCV RBC AUTO: 90 FL (ref 82–98)
MONOCYTES # BLD AUTO: 0.75 THOUSAND/ÂΜL (ref 0.17–1.22)
MONOCYTES NFR BLD AUTO: 7 % (ref 4–12)
NEUTROPHILS # BLD AUTO: 9.46 THOUSANDS/ÂΜL (ref 1.85–7.62)
NEUTS SEG NFR BLD AUTO: 80 % (ref 43–75)
NRBC BLD AUTO-RTO: 0 /100 WBCS
PLATELET # BLD AUTO: 244 THOUSANDS/UL (ref 149–390)
PMV BLD AUTO: 9.6 FL (ref 8.9–12.7)
POTASSIUM SERPL-SCNC: 3.7 MMOL/L (ref 3.5–5.3)
RBC # BLD AUTO: 4.51 MILLION/UL (ref 3.88–5.62)
SODIUM SERPL-SCNC: 135 MMOL/L (ref 135–147)
WBC # BLD AUTO: 11.54 THOUSAND/UL (ref 4.31–10.16)

## 2024-06-07 PROCEDURE — 99291 CRITICAL CARE FIRST HOUR: CPT | Performed by: INTERNAL MEDICINE

## 2024-06-07 PROCEDURE — 85025 COMPLETE CBC W/AUTO DIFF WBC: CPT

## 2024-06-07 PROCEDURE — 94760 N-INVAS EAR/PLS OXIMETRY 1: CPT

## 2024-06-07 PROCEDURE — 94669 MECHANICAL CHEST WALL OSCILL: CPT

## 2024-06-07 PROCEDURE — 94150 VITAL CAPACITY TEST: CPT

## 2024-06-07 PROCEDURE — 94003 VENT MGMT INPAT SUBQ DAY: CPT

## 2024-06-07 PROCEDURE — 80048 BASIC METABOLIC PNL TOTAL CA: CPT

## 2024-06-07 PROCEDURE — 71045 X-RAY EXAM CHEST 1 VIEW: CPT

## 2024-06-07 PROCEDURE — 94640 AIRWAY INHALATION TREATMENT: CPT

## 2024-06-07 RX ORDER — LORAZEPAM 2 MG/ML
INJECTION INTRAMUSCULAR
Status: COMPLETED
Start: 2024-06-07 | End: 2024-06-07

## 2024-06-07 RX ORDER — ACETAMINOPHEN 160 MG/5ML
650 SUSPENSION ORAL EVERY 8 HOURS PRN
Status: DISCONTINUED | OUTPATIENT
Start: 2024-06-07 | End: 2024-06-10 | Stop reason: HOSPADM

## 2024-06-07 RX ORDER — LORAZEPAM 2 MG/ML
2 INJECTION INTRAMUSCULAR ONCE
Status: COMPLETED | OUTPATIENT
Start: 2024-06-07 | End: 2024-06-07

## 2024-06-07 RX ORDER — FUROSEMIDE 10 MG/ML
40 INJECTION INTRAMUSCULAR; INTRAVENOUS
Status: COMPLETED | OUTPATIENT
Start: 2024-06-07 | End: 2024-06-07

## 2024-06-07 RX ADMIN — LEVALBUTEROL HYDROCHLORIDE 1.25 MG: 1.25 SOLUTION RESPIRATORY (INHALATION) at 13:23

## 2024-06-07 RX ADMIN — LEVALBUTEROL HYDROCHLORIDE 1.25 MG: 1.25 SOLUTION RESPIRATORY (INHALATION) at 20:27

## 2024-06-07 RX ADMIN — CHLORHEXIDINE GLUCONATE 15 ML: 1.2 RINSE ORAL at 21:12

## 2024-06-07 RX ADMIN — APIXABAN 5 MG: 5 TABLET, FILM COATED ORAL at 08:29

## 2024-06-07 RX ADMIN — LORAZEPAM 2 MG: 2 INJECTION INTRAMUSCULAR at 05:57

## 2024-06-07 RX ADMIN — CEFEPIME 2000 MG: 2 INJECTION, POWDER, FOR SOLUTION INTRAVENOUS at 11:13

## 2024-06-07 RX ADMIN — CHLORHEXIDINE GLUCONATE 15 ML: 1.2 RINSE ORAL at 08:29

## 2024-06-07 RX ADMIN — LEVALBUTEROL HYDROCHLORIDE 1.25 MG: 1.25 SOLUTION RESPIRATORY (INHALATION) at 02:28

## 2024-06-07 RX ADMIN — FUROSEMIDE 40 MG: 10 INJECTION, SOLUTION INTRAMUSCULAR; INTRAVENOUS at 16:02

## 2024-06-07 RX ADMIN — APIXABAN 5 MG: 5 TABLET, FILM COATED ORAL at 17:15

## 2024-06-07 RX ADMIN — GUAIFENESIN 200 MG: 200 SOLUTION ORAL at 16:02

## 2024-06-07 RX ADMIN — CEFEPIME 2000 MG: 2 INJECTION, POWDER, FOR SOLUTION INTRAVENOUS at 04:46

## 2024-06-07 RX ADMIN — LEVALBUTEROL HYDROCHLORIDE 1.25 MG: 1.25 SOLUTION RESPIRATORY (INHALATION) at 07:42

## 2024-06-07 RX ADMIN — LORAZEPAM 2 MG: 2 INJECTION INTRAMUSCULAR; INTRAVENOUS at 05:57

## 2024-06-07 RX ADMIN — CEFEPIME 2000 MG: 2 INJECTION, POWDER, FOR SOLUTION INTRAVENOUS at 21:12

## 2024-06-07 RX ADMIN — GUAIFENESIN 200 MG: 200 SOLUTION ORAL at 21:12

## 2024-06-07 RX ADMIN — GUAIFENESIN 200 MG: 200 SOLUTION ORAL at 04:00

## 2024-06-07 RX ADMIN — FLUOXETINE 10 MG: 10 CAPSULE ORAL at 08:32

## 2024-06-07 RX ADMIN — FUROSEMIDE 40 MG: 10 INJECTION, SOLUTION INTRAMUSCULAR; INTRAVENOUS at 11:12

## 2024-06-07 RX ADMIN — GUAIFENESIN 200 MG: 200 SOLUTION ORAL at 08:30

## 2024-06-07 NOTE — ASSESSMENT & PLAN NOTE
Concern for increased vagal stimulation resulting hypoxic bradycardia 2/2 to increased secretions and position of trach with mucus plugging  Patient has had multiple episodes of bradycardia into 20-30s due to increased trach secretions with mucus plugging, worsening depended on trach position, thought to stimulate vagal bradycardia. Of note on 5/31, 1x episode resulted in  pulseless asystole without hypoxia, s/p 2 x of epi, bicarb, and calcium, PEA on monitor, ROSC achieved with clearance of secretions.   S/p tracheostomy in 9/2023 at [facility], initially 8.0 cuffed,downsized to a Shiley 4 then capping trial.  Several days into the capping trial he developed aspiration pneumonia and ended up being hospitalized at [facility] with a upsized tracheostomy to 8.0, he had a bronchoscopy performed and required full ventilator support at that time.  Trach exchanged to 6.0 cuffed on 3/18/2024  Home Vent A/ VC withTV 550, RR 24, MV 12-14 L, 100% spontaneous triggers  S/p Bronch on 06/01, exchanged Trach for 6XL during bronch, no further bradycardic events occurred throughout the day  06/02-brooke to 35, hypoxic to 50%, suctioned, noted to have seizure-like activity, 2mg Ativan given   Appreciate neurology consults, shaking believed to be 2/2 hypoxia, no findings on EEG indicating seizure activity.  Bronchoscopy done 6/4, and 6/5 without significant benefit to secretions    Plan:  Continue managing secretions.  Attempting scopolamine patch, without significant change, will continue to trial one more day

## 2024-06-07 NOTE — RESPIRATORY THERAPY NOTE
06/07/24 1626   Respiratory Assessment   Resp Comments Got called to patient room RN was already bagging sx pt for large amount of white frothy thick secretions and sat came back up at this time.

## 2024-06-07 NOTE — RESPIRATORY THERAPY NOTE
06/07/24 1745   Respiratory Assessment   Resp Comments Called to pt room because he wasn't getting his volumes.Sx pt for mod white fothy secretions. VT went back to 430

## 2024-06-07 NOTE — ASSESSMENT & PLAN NOTE
Hx of Testicular CA, S/P 3 cycles CDDP/ ,  then developed neuromuscular syndrome including urinary incontinence and resp failure resulting in vent-dependent, has not established with Neuro  History of anoxic brain injury and unknown neuromuscular disorder  S/p tracheostomy in 9/2023 at [facility], initially 8.0 cuffed,downsized to a Shiley 4 then capping trial.  Several days into the capping trial he developed aspiration pneumonia and ended up being hospitalized at [facility] with a upsized tracheostomy to 8.0, he had a bronchoscopy performed and required full ventilator support at that time.  Trach exchanged to 6.0 cuffed on 3/18/2024  Ventilator dependent 24/7  home vent settings: 18/500/40%/6  Current Vent settings: 18/450/40%/10

## 2024-06-07 NOTE — ASSESSMENT & PLAN NOTE
2x seizure activity, went bradycardic and hypoxic, unsure if seizure activity causes bradycardia/hypoxia or hypoxia/bradycardia causes seizure activity  05/31-06/01 overnight, pulseless asystole without hypoxia, s/p 2 x of epi, bicarb, and calcium, PEA on monitor, ROSC achieved with clearance of secretions, followed byseizure like activity, ativan 2mg given, seizure activity aborted  06/02-brooke to 35, hypoxic to 50%, suctioned, noted to have seizure-like activity, 2mg Ativan given   EEG resulted as normal, activity believed to be due to hypoxia    Plan:  Believed to not be seizure activity, manage secretions and try to keep oxygen sats above 90%.  Reach out to Neurology again due to appearance of activity looking more seizure-like (change in pattern)

## 2024-06-07 NOTE — ASSESSMENT & PLAN NOTE
S/p tracheostomy in 9/2023 at [facility], initially 8.0 cuffed,downsized to a Shiley 4 then capping trial.  Several days into the capping trial he developed aspiration pneumonia and ended up being hospitalized at [facility] with a upsized tracheostomy to 8.0, he had a bronchoscopy performed and required full ventilator support at that time.  Trach exchanged to 6.0 cuffed on 3/18/2024  Home Vent A/ VC withTV 550, RR 24, MV 12-14 L, 100% spontaneous triggers  Concern for increased vagal stimulation resulting hypoxic bradycardia 2/2 to increased secretions and position of trach with mucus plugging  Current Trach is a  6XL   Several bronchs were performed between 6/1-6/5 with no significant relief of secretions   Many hypoxic events yesterday, no events overnight    Plan:  Day 5/7 cefepime  Continuing scopolamine  Continue lasix

## 2024-06-07 NOTE — PROGRESS NOTES
"[facility]  Progress Note  Name: Hemanth Swanson I  MRN: [id number]  Unit/Bed#: ICU 12 I Date of Admission: 5/30/2024   Date of Service: 6/7/2024 I Hospital Day: 8    Assessment & Plan   * Sepsis (HCC)  Assessment & Plan  Presented from PCP with hypoxia with desat in 70s and increased WOB, admitted for, Multilobar PNA on dependent vent   Sepsis Criteria meet: due to leukocytosis and tachypnea.  Suspect the source of pneumonia  CT PE chest: multilobar PNA, most severe in [place], aspiration not excluded, bilateral atelectasis, NO PE  Started on Zoysn abx and deescalated to Ceftriaxone on 5/31, switched to Cefepime on 6/2  Blood cultures and Strep/Legionella Urine cultures negative  Sputum cultures resulted with Serratia marcescens, and Pseudomonas aeruginosa.    No results for input(s): \"PROCALCITONI\" in the last 72 hours.    Recent Labs     06/05/24  0425 06/06/24  0439 06/07/24  0453   WBC 8.70 10.26* 11.54*     Continue Cefepime 4/7  Continue Xopenex, Hypertonic Nebs  Continue Mucinex     Bradycardia  Assessment & Plan  Concern for increased vagal stimulation resulting hypoxic bradycardia 2/2 to increased secretions and position of trach with mucus plugging  Patient has had multiple episodes of bradycardia into 20-30s due to increased trach secretions with mucus plugging, worsening depended on trach position, thought to stimulate vagal bradycardia. Of note on 5/31, 1x episode resulted in  pulseless asystole without hypoxia, s/p 2 x of epi, bicarb, and calcium, PEA on monitor, ROSC achieved with clearance of secretions.   S/p tracheostomy in 9/2023 at [facility], initially 8.0 cuffed,downsized to a Shiley 4 then capping trial.  Several days into the capping trial he developed aspiration pneumonia and ended up being hospitalized at [facility] with a upsized tracheostomy to 8.0, he had a bronchoscopy performed and required full ventilator support at that time.  Trach exchanged to 6.0 " cuffed on 3/18/2024  Home Vent A/ VC withTV 550, RR 24, MV 12-14 L, 100% spontaneous triggers  S/p Bronch on 06/01, exchanged Trach for 6XL during bronch, no further bradycardic events occurred throughout the day  06/02-brooke to 35, hypoxic to 50%, suctioned, noted to have seizure-like activity, 2mg Ativan given   Appreciate neurology consults, shaking believed to be 2/2 hypoxia, no findings on EEG indicating seizure activity.  Bronchoscopy done 6/4, and 6/5 without significant benefit to secretions    Plan:  Continue managing secretions.  Attempting scopolamine patch, without significant change, will continue to trial one more day    Seizure-like activity (HCC)  Assessment & Plan  2x seizure activity, went bradycardic and hypoxic, unsure if seizure activity causes bradycardia/hypoxia or hypoxia/bradycardia causes seizure activity  05/31-06/01 overnight, pulseless asystole without hypoxia, s/p 2 x of epi, bicarb, and calcium, PEA on monitor, ROSC achieved with clearance of secretions, followed byseizure like activity, ativan 2mg given, seizure activity aborted  06/02-brooke to 35, hypoxic to 50%, suctioned, noted to have seizure-like activity, 2mg Ativan given   EEG resulted as normal, activity believed to be due to hypoxia    Plan:  Determined to not be seizure activity, manage secretions and try to keep oxygen sats above 90%.    Ventilator dependent (HCC)  Assessment & Plan  Hx of Testicular CA, S/P 3 cycles CDDP/ ,  then developed neuromuscular syndrome including urinary incontinence and resp failure resulting in vent-dependent, has not established with Neuro  History of anoxic brain injury and unknown neuromuscular disorder  S/p tracheostomy in 9/2023 at [facility], initially 8.0 cuffed,downsized to a Shiley 4 then capping trial.  Several days into the capping trial he developed aspiration pneumonia and ended up being hospitalized at [facility] with a upsized tracheostomy to 8.0, he had a bronchoscopy  performed and required full ventilator support at that time.  Trach exchanged to 6.0 cuffed on 3/18/2024  Ventilator dependent 24/7  home vent settings: 18/500/40%/6  Current Vent settings: 18/450/40%/6      Neuromuscular disorder (HCC)  Assessment & Plan  Hx of Testicular CA, S/P 3 cycles CDDP/ ,  then developed neuromuscular syndrome including urinary incontinence and resp failure resulting in vent-dependent, has not established with Neuro  Neurology outpatient on 7/17    Status post tracheostomy ([facility])  Assessment & Plan  S/p tracheostomy in 9/2023 at [facility], initially 8.0 cuffed,downsized to a Shiley 4 then capping trial.  Several days into the capping trial he developed aspiration pneumonia and ended up being hospitalized at [facility] with a upsized tracheostomy to 8.0, he had a bronchoscopy performed and required full ventilator support at that time.  Trach exchanged to 6.0 cuffed on 3/18/2024  Home Vent A/ VC withTV 550, RR 24, MV 12-14 L, 100% spontaneous triggers  Concern for increased vagal stimulation resulting hypoxic bradycardia 2/2 to increased secretions and position of trach with mucus plugging  Current Trach is a  6XL   Several bronchs were performed between 6/1-6/5 with no significant relief of secretions   Many hypoxic events yesterday, no events overnight    Plan:  Monitor for s/s of infection, currently has increased secretions, likely 2/2 to PNA  Plan for bronchoscopy if hypoxic episodes resume    Impaired physical mobility  Assessment & Plan  -PT/OT for have signed off for now, possibly due to hypoxic episodes (reasoning unclear from notes)    S/P percutaneous endoscopic gastrostomy (PEG) tube placement ([facility])  Assessment & Plan  -Monitor for s/s infection      Depression  Assessment & Plan  Continue fluoxetine    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HCC)  Assessment & Plan  Testicular cancer s/p left orchiectomy on 12/2022  Repeat CT scan in January 2023 with enlarging lymph node.  Began  Chemo in March 2023: Etoposide and Cisplatin with plan for 4 cycles, 5 days every 21 days  Did not complete chemo treatment due to adverse effects  After 3rd cycle reported double vision, labile affect and right sided weakness and therefore chemotherapy was stopped on 5/26/23  Outpatient follow up with Heme/Onc              Disposition: Critical care    ICU Core Measures     Vented Patient  VAP Bundle  VAP bundle ordered     A: Assess, Prevent, and Manage Pain Has pain been assessed? Yes  Need for changes to pain regimen? No   B: Both Spontaneous Awakening Trials (SATs) and Spontaneous Breathing Trials (SBTs) Plan to perform spontaneous awakening trial today? Chronic vent   Plan to perform spontaneous breathing trial today? Chronic vent   Obvious barriers to extubation? Yes   C: Choice of Sedation RASS Goal: 0 Alert and Calm  Need for changes to sedation or analgesia regimen? No   D: Delirium CAM-ICU: Negative   E: Early Mobility  Plan for early mobility? Yes   F: Family Engagement Plan for family engagement today? Yes       Antibiotic Review: Patient on appropriate coverage based on culture data.     Review of Invasive Devices:      Central access plan:  Port-A-Cath      Prophylaxis:  VTE VTE covered by:  apixaban, Per PEG Tube, 5 mg at 06/06/24 1707       Stress Ulcer  not ordered         Significant 24hr Events     24hr events: Continues to have hypoxic/bradycardic events, with 1 being earlier this morning.  Continues to be symptomatic outside of these episodes.     Subjective   Review of Systems: See HPI for Review of Systems     Objective                            Vitals I/O      Most Recent Min/Max in 24hrs   Temp 98.4 °F (36.9 °C) Temp  Min: 98.4 °F (36.9 °C)  Max: 99.3 °F (37.4 °C)   Pulse (!) 113 Pulse  Min: 66  Max: 113   Resp 19 Resp  Min: 18  Max: 20   /79 BP  Min: 99/61  Max: 151/86   O2 Sat 95 % SpO2  Min: 82 %  Max: 100 %      Intake/Output Summary (Last 24 hours) at 6/7/2024 0720  Last data  filed at 6/7/2024 0625  Gross per 24 hour   Intake 2920 ml   Output 2671 ml   Net 249 ml       Diet Enteral/Parenteral; Tube Feeding No Oral Diet; Jevity 1.5; Continuous; 70; 200; Water; Every 4 hours    Invasive Monitoring           Physical Exam   Physical Exam  Vitals and nursing note reviewed.   Skin:     General: Skin is warm and dry.   HENT:      Head: Normocephalic and atraumatic.      Right Ear: Hearing normal.      Left Ear: Hearing normal.      Nose: No congestion.   Neck:      Trachea: Tracheostomy present.   Cardiovascular:      Rate and Rhythm: Normal rate and regular rhythm.      Heart sounds: Normal heart sounds.   Musculoskeletal:      Right lower leg: No edema.      Left lower leg: No edema.   Abdominal: General: There is abdominal type feeding tube.There is no distension.      Palpations: Abdomen is soft.      Tenderness: There is no abdominal tenderness.   Pulmonary:      Breath sounds: Rhonchi present.   Neurological:      Mental Status: He is alert. Mental status is at baseline. He is calm.   Genitourinary/Anorectal:  external catheter present.          Diagnostic Studies      EKG: No new EKGs, occasional episodes of bradycardia on telemetry.  Imaging: No new imaging I have personally reviewed pertinent reports.       Medications:  Scheduled PRN   apixaban, 5 mg, BID  cefepime, 2,000 mg, Q8H  chlorhexidine, 15 mL, Q12H ISAEL  FLUoxetine, 10 mg, Daily  guaiFENesin, 200 mg, Q6H  levalbuterol, 1.25 mg, Q6H  scopolamine, 1 patch, Q72H      atropine, 0.5 mg, Once PRN       Continuous          Labs:    CBC    Recent Labs     06/06/24  0439 06/07/24  0453   WBC 10.26* 11.54*   HGB 12.4 13.2   HCT 38.4 40.7    244     BMP    Recent Labs     06/06/24  0439 06/07/24  0453   SODIUM 136 135   K 3.9 3.7   CL 99 95*   CO2 31 33*   AGAP 6 7   BUN 13 16   CREATININE 0.58* 0.60   CALCIUM 9.4 9.6       Coags    No recent results     Additional Electrolytes  Recent Labs     06/06/24  0439   MG 2.2   PHOS 3.5    CAIONIZED 1.15          Blood Gas    No recent results  No recent results LFTs  No recent results    Infectious  No recent results  Glucose  Recent Labs     06/06/24  0439 06/07/24  0453   GLUC 101 128               Momo Bourne MD

## 2024-06-07 NOTE — ASSESSMENT & PLAN NOTE
"Presented from PCP with hypoxia with desat in 70s and increased WOB, admitted for, Multilobar PNA on dependent vent   Sepsis Criteria meet: due to leukocytosis and tachypnea.  Suspect the source of pneumonia  CT PE chest: multilobar PNA, most severe in [place], aspiration not excluded, bilateral atelectasis, NO PE  Started on Zoysn abx and deescalated to Ceftriaxone on 5/31, switched to Cefepime on 6/2  Blood cultures and Strep/Legionella Urine cultures negative  Sputum cultures resulted with Serratia marcescens, and Pseudomonas aeruginosa.    No results for input(s): \"PROCALCITONI\" in the last 72 hours.    Recent Labs     06/05/24  0425 06/06/24  0439 06/07/24  0453   WBC 8.70 10.26* 11.54*       Continue Cefepime 5/7  Continue Xopenex  Continue Mucinex     "

## 2024-06-07 NOTE — ASSESSMENT & PLAN NOTE
"Presented from PCP with hypoxia with desat in 70s and increased WOB, admitted for, Multilobar PNA on dependent vent   Sepsis Criteria meet: due to leukocytosis and tachypnea.  Suspect the source of pneumonia  CT PE chest: multilobar PNA, most severe in [place], aspiration not excluded, bilateral atelectasis, NO PE  Started on Zoysn abx and deescalated to Ceftriaxone on 5/31, switched to Cefepime on 6/2  Blood cultures and Strep/Legionella Urine cultures negative  Sputum cultures resulted with Serratia marcescens, and Pseudomonas aeruginosa.    No results for input(s): \"PROCALCITONI\" in the last 72 hours.    Recent Labs     06/05/24  0425 06/06/24  0439 06/07/24  0453   WBC 8.70 10.26* 11.54*     Continue Cefepime 4/7  Continue Xopenex, Hypertonic Nebs  Continue Mucinex   "

## 2024-06-07 NOTE — QUICK NOTE
Updated patient's sister over phone, answered all relevant questions to sister satisfaction.  Very hopeful that continued treatment will help her brother get back to baseline and return home.

## 2024-06-07 NOTE — PLAN OF CARE
Problem: Potential for Falls  Goal: Patient will remain free of falls  Description: INTERVENTIONS:  - Educate patient/family on patient safety including physical limitations  - Instruct patient to call for assistance with activity   - Consult OT/PT to assist with strengthening/mobility   - Keep Call bell within reach  - Keep bed low and locked with side rails adjusted as appropriate  - Keep care items and personal belongings within reach  - Initiate and maintain comfort rounds  - Make Fall Risk Sign visible to staff  - Apply yellow socks and bracelet for high fall risk patients  - Consider moving patient to room near nurses station  Outcome: Progressing     Problem: Prexisting or High Potential for Compromised Skin Integrity  Goal: Skin integrity is maintained or improved  Description: INTERVENTIONS:  - Identify patients at risk for skin breakdown  - Assess and monitor skin integrity  - Assess and monitor nutrition and hydration status  - Monitor labs   - Assess for incontinence   - Turn and reposition patient  - Assist with mobility/ambulation  - Relieve pressure over bony prominences  - Avoid friction and shearing  - Provide appropriate hygiene as needed including keeping skin clean and dry  - Evaluate need for skin moisturizer/barrier cream  - Collaborate with interdisciplinary team   - Patient/family teaching  - Consider wound care consult   Outcome: Progressing     Problem: Nutrition/Hydration-ADULT  Goal: Nutrient/Hydration intake appropriate for improving, restoring or maintaining nutritional needs  Description: Monitor and assess patient's nutrition/hydration status for malnutrition. Collaborate with interdisciplinary team and initiate plan and interventions as ordered.  Monitor patient's weight and dietary intake as ordered or per policy. Utilize nutrition screening tool and intervene as necessary. Determine patient's food preferences and provide high-protein, high-caloric foods as appropriate.      INTERVENTIONS:  - Monitor oral intake, urinary output, labs, and treatment plans  - Assess nutrition and hydration status and recommend course of action  - Evaluate amount of meals eaten  - Assist patient with eating if necessary   - Allow adequate time for meals  - Recommend/ encourage appropriate diets, oral nutritional supplements, and vitamin/mineral supplements  - Order, calculate, and assess calorie counts as needed  - Recommend, monitor, and adjust tube feedings and TPN/PPN based on assessed needs  - Assess need for intravenous fluids  - Provide specific nutrition/hydration education as appropriate  - Include patient/family/caregiver in decisions related to nutrition  Outcome: Progressing     Problem: PAIN - ADULT  Goal: Verbalizes/displays adequate comfort level or baseline comfort level  Description: Interventions:  - Encourage patient to monitor pain and request assistance  - Assess pain using appropriate pain scale  - Administer analgesics based on type and severity of pain and evaluate response  - Implement non-pharmacological measures as appropriate and evaluate response  - Consider cultural and social influences on pain and pain management  - Notify physician/advanced practitioner if interventions unsuccessful or patient reports new pain  Outcome: Progressing     Problem: INFECTION - ADULT  Goal: Absence or prevention of progression during hospitalization  Description: INTERVENTIONS:  - Assess and monitor for signs and symptoms of infection  - Monitor lab/diagnostic results  - Monitor all insertion sites, i.e. indwelling lines, tubes, and drains  - Monitor endotracheal if appropriate and nasal secretions for changes in amount and color  - [place] appropriate cooling/warming therapies per order  - Administer medications as ordered  - Instruct and encourage patient and family to use good hand hygiene technique  - Identify and instruct in appropriate isolation precautions for identified  infection/condition  Outcome: Progressing  Goal: Absence of fever/infection during neutropenic period  Description: INTERVENTIONS:  - Monitor WBC    Outcome: Progressing     Problem: DISCHARGE PLANNING  Goal: Discharge to home or other facility with appropriate resources  Description: INTERVENTIONS:  - Identify barriers to discharge w/patient and caregiver  - Arrange for needed discharge resources and transportation as appropriate  - Identify discharge learning needs (meds, wound care, etc.)  - Arrange for interpretive services to assist at discharge as needed  - Refer to Case Management Department for coordinating discharge planning if the patient needs post-hospital services based on physician/advanced practitioner order or complex needs related to functional status, cognitive ability, or social support system  Outcome: Progressing     Problem: Knowledge Deficit  Goal: Patient/family/caregiver demonstrates understanding of disease process, treatment plan, medications, and discharge instructions  Description: Complete learning assessment and assess knowledge base.  Interventions:  - Provide teaching at level of understanding  - Provide teaching via preferred learning methods  Outcome: Progressing

## 2024-06-07 NOTE — PLAN OF CARE
Problem: Potential for Falls  Goal: Patient will remain free of falls  Description: INTERVENTIONS:  - Educate patient/family on patient safety including physical limitations  - Instruct patient to call for assistance with activity   - Consult OT/PT to assist with strengthening/mobility   - Keep Call bell within reach  - Keep bed low and locked with side rails adjusted as appropriate  - Keep care items and personal belongings within reach  - Initiate and maintain comfort rounds  - Make Fall Risk Sign visible to staff  - Offer Toileting every 2 Hours, in advance of need  - Initiate/Maintain bed alarm  - Obtain necessary fall risk management equipment  - Apply yellow socks and bracelet for high fall risk patients  - Consider moving patient to room near nurses station  Outcome: Progressing     Problem: Prexisting or High Potential for Compromised Skin Integrity  Goal: Skin integrity is maintained or improved  Description: INTERVENTIONS:  - Identify patients at risk for skin breakdown  - Assess and monitor skin integrity  - Assess and monitor nutrition and hydration status  - Monitor labs   - Assess for incontinence   - Turn and reposition patient  - Assist with mobility/ambulation  - Relieve pressure over bony prominences  - Avoid friction and shearing  - Provide appropriate hygiene as needed including keeping skin clean and dry  - Evaluate need for skin moisturizer/barrier cream  - Collaborate with interdisciplinary team   - Patient/family teaching  - Consider wound care consult   Outcome: Progressing     Problem: Nutrition/Hydration-ADULT  Goal: Nutrient/Hydration intake appropriate for improving, restoring or maintaining nutritional needs  Description: Monitor and assess patient's nutrition/hydration status for malnutrition. Collaborate with interdisciplinary team and initiate plan and interventions as ordered.  Monitor patient's weight and dietary intake as ordered or per policy. Utilize nutrition screening tool  and intervene as necessary. Determine patient's food preferences and provide high-protein, high-caloric foods as appropriate.     INTERVENTIONS:  - Monitor oral intake, urinary output, labs, and treatment plans  - Assess nutrition and hydration status and recommend course of action  - Evaluate amount of meals eaten  - Assist patient with eating if necessary   - Allow adequate time for meals  - Recommend/ encourage appropriate diets, oral nutritional supplements, and vitamin/mineral supplements  - Order, calculate, and assess calorie counts as needed  - Recommend, monitor, and adjust tube feedings and TPN/PPN based on assessed needs  - Assess need for intravenous fluids  - Provide specific nutrition/hydration education as appropriate  - Include patient/family/caregiver in decisions related to nutrition  Outcome: Progressing     Problem: PAIN - ADULT  Goal: Verbalizes/displays adequate comfort level or baseline comfort level  Description: Interventions:  - Encourage patient to monitor pain and request assistance  - Assess pain using appropriate pain scale  - Administer analgesics based on type and severity of pain and evaluate response  - Implement non-pharmacological measures as appropriate and evaluate response  - Consider cultural and social influences on pain and pain management  - Notify physician/advanced practitioner if interventions unsuccessful or patient reports new pain  Outcome: Progressing     Problem: INFECTION - ADULT  Goal: Absence or prevention of progression during hospitalization  Description: INTERVENTIONS:  - Assess and monitor for signs and symptoms of infection  - Monitor lab/diagnostic results  - Monitor all insertion sites, i.e. indwelling lines, tubes, and drains  - Monitor endotracheal if appropriate and nasal secretions for changes in amount and color  - [place] appropriate cooling/warming therapies per order  - Administer medications as ordered  - Instruct and encourage patient and  family to use good hand hygiene technique  - Identify and instruct in appropriate isolation precautions for identified infection/condition  Outcome: Progressing  Goal: Absence of fever/infection during neutropenic period  Description: INTERVENTIONS:  - Monitor WBC    Outcome: Progressing     Problem: DISCHARGE PLANNING  Goal: Discharge to home or other facility with appropriate resources  Description: INTERVENTIONS:  - Identify barriers to discharge w/patient and caregiver  - Arrange for needed discharge resources and transportation as appropriate  - Identify discharge learning needs (meds, wound care, etc.)  - Arrange for interpretive services to assist at discharge as needed  - Refer to Case Management Department for coordinating discharge planning if the patient needs post-hospital services based on physician/advanced practitioner order or complex needs related to functional status, cognitive ability, or social support system  Outcome: Progressing     Problem: Knowledge Deficit  Goal: Patient/family/caregiver demonstrates understanding of disease process, treatment plan, medications, and discharge instructions  Description: Complete learning assessment and assess knowledge base.  Interventions:  - Provide teaching at level of understanding  - Provide teaching via preferred learning methods  Outcome: Progressing

## 2024-06-07 NOTE — ASSESSMENT & PLAN NOTE
S/p tracheostomy in 9/2023 at [facility], initially 8.0 cuffed,downsized to a Shiley 4 then capping trial.  Several days into the capping trial he developed aspiration pneumonia and ended up being hospitalized at [facility] with a upsized tracheostomy to 8.0, he had a bronchoscopy performed and required full ventilator support at that time.  Trach exchanged to 6.0 cuffed on 3/18/2024  Home Vent A/ VC withTV 550, RR 24, MV 12-14 L, 100% spontaneous triggers  Concern for increased vagal stimulation resulting hypoxic bradycardia 2/2 to increased secretions and position of trach with mucus plugging  Current Trach is a  6XL   Several bronchs were performed between 6/1-6/5 with no significant relief of secretions   Many hypoxic events yesterday, no events overnight    Plan:  Monitor for s/s of infection, currently has increased secretions, likely 2/2 to PNA  Plan for bronchoscopy if hypoxic episodes resume

## 2024-06-07 NOTE — ASSESSMENT & PLAN NOTE
Concern for increased vagal stimulation resulting hypoxic bradycardia 2/2 to increased secretions and position of trach with mucus plugging  Patient has had multiple episodes of bradycardia into 20-30s due to increased trach secretions with mucus plugging, worsening depended on trach position, thought to stimulate vagal bradycardia. Of note on 5/31, 1x episode resulted in  pulseless asystole without hypoxia, s/p 2 x of epi, bicarb, and calcium, PEA on monitor, ROSC achieved with clearance of secretions.   S/p tracheostomy in 9/2023 at [facility], initially 8.0 cuffed,downsized to a Shiley 4 then capping trial.  Several days into the capping trial he developed aspiration pneumonia and ended up being hospitalized at [facility] with a upsized tracheostomy to 8.0, he had a bronchoscopy performed and required full ventilator support at that time.  Trach exchanged to 6.0 cuffed on 3/18/2024  Home Vent A/ VC withTV 550, RR 24, MV 12-14 L, 100% spontaneous triggers  S/p Bronch on 06/01, exchanged Trach for 6XL during bronch, no further bradycardic events occurred throughout the day  06/02-brooke to 35, hypoxic to 50%, suctioned, noted to have seizure-like activity, 2mg Ativan given   Appreciate neurology consults, shaking believed to be 2/2 hypoxia, no findings on EEG indicating seizure activity.  Bronchoscopy done 6/4, and 6/5 without significant benefit to secretions    Plan:  Continue managing secretions.  Attempting scopolamine patch, with improvement of oropharyngeal secretions

## 2024-06-07 NOTE — UTILIZATION REVIEW
Initial Clinical Review    Admission: Date/Time/Statement:   Admission Orders (From admission, onward)       Ordered        05/30/24 2239  INPATIENT ADMISSION  Once                          Orders Placed This Encounter   Procedures    INPATIENT ADMISSION     Standing Status:   Standing     Number of Occurrences:   1     Order Specific Question:   Level of Care     Answer:   Level 1 Stepdown [13]     Order Specific Question:   Estimated length of stay     Answer:   More than 2 Midnights     Order Specific Question:   Certification     Answer:   I certify that inpatient services are medically necessary for this patient for a duration of greater than two midnights. See H&P and MD Progress Notes for additional information about the patient's course of treatment.     ED Arrival Information       Expected   -    Arrival   5/30/2024 16:19    Acuity   Urgent              Means of arrival   Stretcher    Escorted by   Family Member    Service   Hospitalist    Admission type   Emergency              Arrival complaint   Breathing Issues (on strecther on O2)             Chief Complaint   Patient presents with    Shortness of Breath     Arriving from an outpatient appt. Provider was concerned about possible pneumonia. Pt is diaphoretic and was hypoxic in the office.       Initial Presentation: 39 y.o. male  to ED via  stretcher  from  outpatient appointment .    Admitted to inpatient with Dx: Sepsis due to multilobar pneumonia/ home ventilator dependent/status post trache/peg/Neuromuscular disorder.  Presented to ED with desaturation to 70s at PCP office on day of arrival, concerned of pneumonia and sent to ED.  Complaints of shortness of breath.   2 to 3 weeks decreased appetite.    . PMHx: chronic ventilator dependency due to anoxic brain injury and unknown neuromuscular disorder, PE on Eliquis . On exam: appears ill.  Left ear with ulceration and purulent drainage on pinna.  Trache site with some erythema and purulent  drainage.   Tachypnea.  Decreased air movement.  Trache on home ventilator trilogy luci.    Diffusely decreased breath sounds with scattered rhonchi.   Trache suctioned for thick purulent secretions.   Diaphoresis.  Non verbal, shakes head to questions.  Irreducible umbilical hernia, abdomen soft.  Peg site c/d/I.   Wbc 12.21.   Imaging shows consolidations of lower lobes consistent with atelectasis,  multi lobar pneumonia,  compression deformities of T11-L2.   ED treatment:   trache mask then continued on vent.  Given 1 liter IVF, started on antibiotics, given neb x 2   Plan includes  to continue home vent settings.   PT/OT.  Continue Zosyn.     Anticipated Length of Stay/Certification Statement:  Anticipated Length of Stay is > 2 midnights     Date: 5/31/24    Day 2:  patient with episode of pulseless asystole, chest compressions started, pad placement and port access.   PEA on monitor.  ACLS protocol and given 2 rounds of epi, bicarb and calcium .  Trache suctioned for copious secretions.   ROSC then began with tonic clonic movements and given ativan.   Then returned to baseline.    On exam: cough non productive.  Trache.  Suctioned frothy sputum.   Coarse breath sounds.   Sinus bradycardia.   4/5 all extremities.  GCS 11.  Continue aggressive chest PT, 3% saline nebs, airway clearance protocol. Consider bronch if high volume sections persist. May require exchange of tracheostomy given positional nature, and airleak noted when not in certain alignment. Consider neurology consult for possible EEG if further seizure activity noted. Ativan IV PRN for seizure activity.      Patient has crossed 3 midnights and requires ongoing care    6/7/2024 .  Patient has had 3 episodes of bradycardia into 20s due to increased trach secretions with mucus plugging, worsening depended on trach position, thought to stimulate vagal bradycardia since admission.  Today with  with persistent increased secretions.   MSK chest pain.    On  exam appears ill.  Decreased breath sounds and diffuse Rhonchi.   Trache in place.  Vent 18/500/40/6.    Abnormal labs or imaging:  wbc 12.35.   H&h 11.5/35.5.  K 3.4.     Diagnosis/Plan    Sepsis/Bradycardia/impaired physical mobility/status post trache, peg/neuromuscular disorder/ventilator dependent. Suspect etiology of asystole is hypoxic brooke arrest secondary to increased secretions and position of trache.   Plan:  continue ceftriaxone.  Follow cultures.   Trial lasix with goal -1L.   Follow cultures.  Continue Xopenex, Hypertonic Nebs, Mucinex.    Bronch, will have Trach 6 XL at bedside.  Hold tube feeds.   Continue home vent settings: 18/500/40%/6     ED Triage Vitals   Temperature Pulse Respirations Blood Pressure SpO2   05/30/24 1704 05/30/24 1657 05/30/24 1711 05/30/24 1711 05/30/24 1657   99.4 °F (37.4 °C) 74 22 129/86 95 %      Temp Source Heart Rate Source Patient Position - Orthostatic VS BP Location FiO2 (%)   05/30/24 1704 05/30/24 1704 05/31/24 0330 05/30/24 1704 06/02/24 0747   Oral Monitor Lying Right arm 40      Pain Score       05/30/24 1846       5          Wt Readings from Last 1 Encounters:   06/07/24 110 kg (242 lb 8.1 oz)     Additional Vital Signs:   06/01/24 0728 98.1 °F (36.7 °C) -- -- 89/57 Abnormal  67 -- -- -- --   06/01/24 0600 -- 81 19 100/65 78 95 % -- -- --   06/01/24 0533 -- 68 20 95/55 69 98 % -- -- --   06/01/24 0500 -- 65 18 92/54 68 99 % -- -- --   06/01/24 0445 -- 66 19 93/56 69 98 % -- -- --   06/01/24 0430 -- 72 20 98/60 74 98 % -- -- --   06/01/24 0415 -- 65 19 102/63 77 98 % -- -- --   06/01/24 0400 -- 62 18 100/65 78 98 % -- -- --   06/01/24 0350 -- 60 18 85/51 Abnormal  63 Abnormal  97 % -- -- --   06/01/24 0340 -- 66 18 88/50 Abnormal  64 Abnormal  97 % -- -- --   06/01/24 0335 -- 68 23 Abnormal  86/50 Abnormal  62 Abnormal  97 % -- -- --   06/01/24 0330 -- 68 20 89/50 Abnormal  64 Abnormal  97 % -- -- --   06/01/24 0322 97.5 °F (36.4 °C) -- -- -- -- -- -- -- --    06/01/24 0201 96.9 °F (36.1 °C) Abnormal  -- -- -- -- --        05/31/24 1516 97.6 °F (36.4 °C) -- -- 92/57 70 -- -- -- --   05/31/24 1300 -- 81 25 Abnormal  91/60 71 94 % -- -- --   05/31/24 1230 -- 98 18 106/69 82 92 % -- -- --   05/31/24 1200 97.1 °F (36.2 °C) Abnormal  110 Abnormal  18 127/75 95 99 % -- Ventilator Lying   05/31/24 1156 -- 109 Abnormal  22 -- -- 100 % -- -- --   05/31/24 1155 -- 76 37 Abnormal  197/119 Abnormal  151 74 % Abnormal  -- -- --   05/31/24 1154 -- 28 Abnormal  35 Abnormal  -- -- 82 % Abnormal  -- -- --   05/31/24 1153 -- 41 Abnormal  18 -- -- 96 % -- -- --   05/31/24 1152 -- 52 Abnormal  18 -- -- 97 %        Date/Time Temp Pulse Resp BP MAP (mmHg) SpO2 O2 Flow Rate (L/min) O2 Device Patient Position - Orthostatic VS   05/31/24 1100 -- -- -- -- -- 96 % -- -- --   05/31/24 0751 -- -- -- -- -- 93 % -- -- --   05/31/24 0600 97.8 °F (36.6 °C) -- 19 108/61 77 95 % -- Ventilator Lying   05/31/24 0330 -- 49 Abnormal  18 110/67 84 98 % -- Ventilator Lying   05/31/24 0300 -- 51 Abnormal  18 103/59 76 97 % -- -- --   05/31/24 0245 -- -- -- -- -- 96 % -- -- --   05/31/24 0200 -- 48 Abnormal  20 98/67 78 96 % -- -- --   05/31/24 0130 -- 49 Abnormal  20 98/61 75 95 % -- Ventilator --   05/31/24 0039 97.6 °F (36.4 °C) -- -- -- -- -- -- -- --   05/31/24 0030 -- 52 Abnormal  18 97/59 74 93 % 6 L/min Ventilator --   05/30/24 2308 -- -- -- -- -- 95 % -- -- --   05/30/24 2300 -- 53 Abnormal  20 94/53 67 96 % -- -- --   05/30/24 2200 -- 57 22 97/60 74 94 % -- -- --   05/30/24 2100 -- 61 22 105/52 74 92 % -- -- --   05/30/24 2030 -- 54 Abnormal  22 167/96 124 94 % -- -- --   05/30/24 1900 -- 76 20 112/65 82 95 % -- -- --   05/30/24 1845 -- 78 20 156/94 120 97 % -- -- --   05/30/24 1807 98.4 °F (36.9 °C) -- -- -- -- -- -- -- --   05/30/24 1800 -- 68 22 115/77 92 91 % -- --      Pertinent Labs/Diagnostic Test Results:   XR chest portable ICU   Final Result by Sebastian Mayorga MD (06/04 7132)   Stable tubes and  lines without pneumothorax or other complication.   Mild central congestion and left basilar pleural effusion as before.            Workstation performed: [id number]         XR chest portable ICU   Final Result by Rivera Matt MD (06/03 1312)      Stable left basilar patchy opacity. Effusion not excluded. Improved right-sided airspace disease            Workstation performed: [id number]         XR chest portable ICU   Final Result by Joan Mcintyre MD (06/01 0517)      Worsening bilateral consolidation which may be a combination of edema and pneumonia.      Persistent bibasilar atelectasis.            Workstation performed: [id number]         PE Study with CT abdomen & pelvis with contrast   Final Result by Andrea Cook MD (05/30 2102)      No pulmonary embolism.      Dense consolidation in the lower lobes reidentified may represent chronic atelectasis.      New groundglass mixed with alveolar opacities in the remainder of the lung fields most severe in the left upper lobe consistent with multi lobar pneumonia, aspiration not excluded.      Interval development of mild multilevel superior plate compression deformities at T11-L2 with sclerosis suggesting chronic deformities. Correlate for focal back tenderness.            Workstation performed: [id number]         XR chest 1 view portable   ED Interpretation by Nakia Menard MD (05/30 1830)   Poor inspiratory effort, possible LLL opacification concerning for PNA. Will obtain CT chest      Final Result by Estefania Ngo MD (05/31 1412)   Low lung volumes with bilateral lower lobe opacities reflecting atelectasis versus infiltrates, progressed.            Workstation performed: [id number]         MRI brain w wo contrast    (Results Pending)   XR chest portable ICU    (Results Pending)     Date/Time: 5/30/2024 5:35 PM   Indications / Diagnosis:  Sob   Patient location:  ED   Previous ECG:     Previous ECG:  Compared to current     Comparison ECG  info:  2/10/24     Similarity:  No change   Interpretation:     Interpretation: normal    Rate:     ECG rate:  65     ECG rate assessment: normal    Rhythm:     Rhythm: sinus rhythm    Ectopy:     Ectopy: none    QRS:     QRS axis:  Normal     QRS intervals:  Normal   Conduction:     Conduction: normal    ST segments:     ST segments:  Normal   T waves:     T waves: normal             Results from last 7 days   Lab Units 06/07/24 0453 06/06/24 0439 06/05/24 0425 06/04/24 0442 06/03/24 0446 05/31/24 2050 05/31/24 2047   WBC Thousand/uL 11.54* 10.26* 8.70 8.18 7.94   < > 17.11*   HEMOGLOBIN g/dL 13.2 12.4 11.6* 12.0 11.3*   < > 13.0   I STAT HEMOGLOBIN   --   --   --   --   --    < >  --    HEMATOCRIT % 40.7 38.4 36.2* 36.9 35.1*   < > 41.6   HEMATOCRIT, ISTAT   --   --   --   --   --    < >  --    PLATELETS Thousands/uL 244 227 195 221 204   < > 291   TOTAL NEUT ABS Thousands/µL 9.46* 7.94* 6.32 5.96 5.41   < >  --    BANDS PCT %  --   --   --   --   --   --  3    < > = values in this interval not displayed.     Results from last 7 days   Lab Units 06/07/24 0453 06/06/24 0439 06/05/24 0425 06/04/24 0442 06/03/24 0446 06/02/24 0454 06/01/24 0536 06/01/24 0532 05/31/24 2050 05/31/24 2047   SODIUM mmol/L 135 136 136 136 136 137  --  137  --  137   POTASSIUM mmol/L 3.7 3.9 3.9 4.0 3.5 3.7  --  3.4*  --  3.8   CHLORIDE mmol/L 95* 99 102 102 103 102  --  101  --  99   CO2 mmol/L 33* 31 27 27 26 27  --  29  --  29   CO2, I-STAT mmol/L  --   --   --   --   --   --   --   --  30  --    ANION GAP mmol/L 7 6 7 7 7 8  --  7  --  9   BUN mg/dL 16 13 11 11 8 6  --  6  --  7   CREATININE mg/dL 0.60 0.58* 0.49* 0.57* 0.66 0.68  --  0.58*  --  0.72   EGFR ml/min/1.73sq m 128 130 139 131 123 122  --  130  --  119   CALCIUM mg/dL 9.6 9.4 8.8 9.1 8.8 9.0  --  9.2  --  13.8*   CALCIUM, IONIZED mmol/L  --  1.15  --   --   --  1.17 1.15  --   --  1.79*   CALCIUM, IONIZED, ISTAT mmol/L  --   --   --   --   --   --   --   --   1.96*  --    MAGNESIUM mg/dL  --  2.2  --   --  2.2 2.2  --  2.2  --  2.4   PHOSPHORUS mg/dL  --  3.5  --   --  3.7 3.6  --  3.7  --  6.3*           Results from last 7 days   Lab Units 06/07/24  0453 06/06/24  0439 06/05/24  0425 06/04/24  0442 06/03/24  0446 06/02/24  0454 06/01/24  0532 05/31/24  2047   GLUCOSE RANDOM mg/dL 128 101 104 105 101 84 91 179*           Results from last 7 days   Lab Units 05/31/24  2118   PROTIME seconds 17.3*   INR  1.34*     Results from last 7 days   Lab Units 06/01/24  0532   PROCALCITONIN ng/ml 0.08     Results from last 7 days   Lab Units 05/31/24  2315 05/31/24  2047   LACTIC ACID mmol/L 1.3 7.5*           Results from last 7 days   Lab Units 06/01/24  1216   STREP PNEUMONIAE ANTIGEN, URINE  Negative   LEGIONELLA URINARY ANTIGEN  Negative     Results from last 7 days   Lab Units 06/01/24  1233   GRAM STAIN RESULT  2+ Polys*  2+ Gram negative rods*       ED Treatment:   Medication Administration from 05/30/2024 1619 to 05/31/2024 0520         Date/Time Order Dose Route Action Comments     05/30/2024 1846 EDT HYDROmorphone (DILAUDID) injection 0.5 mg 0.5 mg Intravenous Given --     05/30/2024 2031 EDT piperacillin-tazobactam (ZOSYN) IVPB 4.5 g 4.5 g Intravenous New Bag --     05/31/2024 0245 EDT albuterol inhalation solution 2.5 mg 2.5 mg Nebulization Given --     05/30/2024 2307 EDT albuterol inhalation solution 2.5 mg 2.5 mg Nebulization Given --     05/30/2024 2253 EDT apixaban (ELIQUIS) tablet 5 mg 5 mg Per PEG Tube Given --     05/30/2024 2307 EDT sodium chloride 0.9 % inhalation solution 1 mL 1 mL Nebulization Given --     05/30/2024 2301 EDT sodium chloride 0.9 % bolus 1,000 mL 1,000 mL Intravenous New Bag --     05/31/2024 0033 EDT chlorhexidine (PERIDEX) 0.12 % oral rinse 15 mL 15 mL Mouth/Throat Given --     05/31/2024 0306 EDT piperacillin-tazobactam (ZOSYN) IVPB 4.5 g 4.5 g Intravenous New Bag --          Past Medical History:   Diagnosis Date    Anxiety     Cancer  (HCC)     Depression     Migration of percutaneous endoscopic gastrostomy (PEG) tube (HCC) 09/24/2023    Psychiatric disorder     bipolar     Present on Admission:   Impaired physical mobility   Sepsis (HCC)   Neuromuscular disorder (HCC)   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HCC)   Depression      Admitting Diagnosis: Irreducible umbilical hernia [K42.0]  Tracheitis [J04.10]  SOB (shortness of breath) [R06.02]  Elevated alkaline phosphatase level [R74.8]  Neuromuscular weakness (HCC) [G70.9]  Impaired physical mobility [Z74.09]  Pressure ulcer [L89.90]  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 male  Admission Orders:  Scheduled Medications:  apixaban, 5 mg, Per PEG Tube, BID  cefTRIAXone, 2,000 mg, Intravenous, Q24H  chlorhexidine, 15 mL, Mouth/Throat, Q12H ISAEL  guaiFENesin, 200 mg, Oral, Q6H  levalbuterol, 1.25 mg, Nebulization, Q6H  sodium chloride, 4 mL, Nebulization, Q6H    albuterol inhalation solution 2.5 mg  Dose: 2.5 mg  Freq: Every 4 hours Route: NEBULIZATION  Start: 05/30/24 2230 End: 05/31/24 0540   ipratropium (ATROVENT) 0.02 % inhalation solution 0.5 mg  Dose: 0.5 mg  Freq: 3 times daily (RESP) Route: NEBULIZATION  Start: 05/31/24 0800 End: 05/31/24 0904  piperacillin-tazobactam (ZOSYN) IVPB (EXTENDED INFUSION) 4.5 g  Dose: 4.5 g  Freq: Every 8 hours Route: IV  Start: 05/31/24 0646 End: 05/31/24 0904    acetaminophen (Ofirmev) injection 1,000 mg  Dose: 1,000 mg  Freq: Once Route: IV  Last Dose: Stopped (05/31/24 2215)  Start: 05/31/24 2145 End: 05/31/24 2215    furosemide (LASIX) injection 20 mg  Dose: 20 mg  Freq: Once Route: IV  Start: 06/01/24 1200 End: 06/01/24 1215  multi-electrolyte (ISOLYTE-S PH 7.4) bolus 500 mL  Dose: 500 mL  Freq: Once Route: IV  Last Dose: Stopped (06/01/24 0912)  Start: 06/01/24 0345 End: 06/01/24 0912  multi-electrolyte (ISOLYTE-S  PH 7.4) bolus 500 mL  Dose: 500 mL  Freq: Once Route: IV  Last Dose: Stopped (05/31/24 2220)  Start: 05/31/24 2145 End: 05/31/24 2220  potassium chloride 20 mEq IVPB (premix)  Dose: 20 mEq  Freq: Every 2 hours Route: IV  Last Dose: 20 mEq (06/01/24 1140)  Start: 06/01/24 0715 End: 06/01/24 1340       Continuous IV Infusions:     PRN Meds:  atropine, 0.5 mg, Intravenous, Once PRN      Neuro checks every 4 hours  Cardio pulmonary monitoring  NPO  Mechanical ventilation.  On home vent/Trilogy vent    Network Utilization Review Department  ATTENTION: Please call with any questions or concerns to [phone] and carefully listen to the prompts so that you are directed to the right person. All voicemails are confidential.   For Discharge needs, contact Care Management DC Support Team at [phone] opt. 2  Send all requests for admission clinical reviews, approved or denied determinations and any other requests to dedicated fax number below belonging to the [place] where the patient is receiving treatment. List of dedicated fax numbers for the Facilities:  FACILITY NAME UR FAX NUMBER   ADMISSION DENIALS (Administrative/Medical Necessity) [phone]   DISCHARGE SUPPORT TEAM (NETWORK) [phone]   PARENT CHILD HEALTH (Maternity/NICU/Pediatrics) [phone]   [facility] [phone]   [facility] [phone]   [facility] [phone]   [facility] [phone]   [facility] [phone]   General acute hospital [phone]   [facility] [phone]   [facility] [phone]   [facility] [phone]   [facility] [phone]   [facility] [phone]   [facility] [phone]

## 2024-06-07 NOTE — ASSESSMENT & PLAN NOTE
Hx of Testicular CA, S/P 3 cycles CDDP/ ,  then developed neuromuscular syndrome including urinary incontinence and resp failure resulting in vent-dependent, has not established with Neuro  History of anoxic brain injury and unknown neuromuscular disorder  S/p tracheostomy in 9/2023 at [facility], initially 8.0 cuffed,downsized to a Shiley 4 then capping trial.  Several days into the capping trial he developed aspiration pneumonia and ended up being hospitalized at [facility] with a upsized tracheostomy to 8.0, he had a bronchoscopy performed and required full ventilator support at that time.  Trach exchanged to 6.0 cuffed on 3/18/2024  Ventilator dependent 24/7  home vent settings: 18/500/40%/6  Current Vent settings: 18/450/40%/6

## 2024-06-08 LAB
ALBUMIN SERPL BCP-MCNC: 4.2 G/DL (ref 3.5–5)
ALP SERPL-CCNC: 118 U/L (ref 34–104)
ALT SERPL W P-5'-P-CCNC: 47 U/L (ref 7–52)
ANION GAP SERPL CALCULATED.3IONS-SCNC: 9 MMOL/L (ref 4–13)
AST SERPL W P-5'-P-CCNC: 29 U/L (ref 13–39)
BASE EX.OXY STD BLDV CALC-SCNC: 62.3 % (ref 60–80)
BASE EXCESS BLDV CALC-SCNC: 5.9 MMOL/L
BILIRUB SERPL-MCNC: 0.59 MG/DL (ref 0.2–1)
BUN SERPL-MCNC: 20 MG/DL (ref 5–25)
CA-I BLD-SCNC: 1.11 MMOL/L (ref 1.12–1.32)
CALCIUM SERPL-MCNC: 9.7 MG/DL (ref 8.4–10.2)
CHLORIDE SERPL-SCNC: 94 MMOL/L (ref 96–108)
CO2 SERPL-SCNC: 33 MMOL/L (ref 21–32)
CREAT SERPL-MCNC: 0.56 MG/DL (ref 0.6–1.3)
ERYTHROCYTE [DISTWIDTH] IN BLOOD BY AUTOMATED COUNT: 15.5 % (ref 11.6–15.1)
GFR SERPL CREATININE-BSD FRML MDRD: 132 ML/MIN/1.73SQ M
GLUCOSE SERPL-MCNC: 101 MG/DL (ref 65–140)
HCO3 BLDV-SCNC: 31.7 MMOL/L (ref 24–30)
HCT VFR BLD AUTO: 42 % (ref 36.5–49.3)
HGB BLD-MCNC: 13.6 G/DL (ref 12–17)
MAGNESIUM SERPL-MCNC: 2.4 MG/DL (ref 1.9–2.7)
MCH RBC QN AUTO: 29.4 PG (ref 26.8–34.3)
MCHC RBC AUTO-ENTMCNC: 32.4 G/DL (ref 31.4–37.4)
MCV RBC AUTO: 91 FL (ref 82–98)
O2 CT BLDV-SCNC: 12.7 ML/DL
PCO2 BLDV: 50 MM HG (ref 42–50)
PH BLDV: 7.42 [PH] (ref 7.3–7.4)
PHOSPHATE SERPL-MCNC: 3.6 MG/DL (ref 2.7–4.5)
PLATELET # BLD AUTO: 259 THOUSANDS/UL (ref 149–390)
PMV BLD AUTO: 9.7 FL (ref 8.9–12.7)
PO2 BLDV: 33.1 MM HG (ref 35–45)
POTASSIUM SERPL-SCNC: 3.7 MMOL/L (ref 3.5–5.3)
PROCALCITONIN SERPL-MCNC: 0.11 NG/ML
PROT SERPL-MCNC: 8.4 G/DL (ref 6.4–8.4)
RBC # BLD AUTO: 4.62 MILLION/UL (ref 3.88–5.62)
SODIUM SERPL-SCNC: 136 MMOL/L (ref 135–147)
WBC # BLD AUTO: 13.41 THOUSAND/UL (ref 4.31–10.16)

## 2024-06-08 PROCEDURE — 94669 MECHANICAL CHEST WALL OSCILL: CPT

## 2024-06-08 PROCEDURE — 80053 COMPREHEN METABOLIC PANEL: CPT | Performed by: EMERGENCY MEDICINE

## 2024-06-08 PROCEDURE — 94150 VITAL CAPACITY TEST: CPT

## 2024-06-08 PROCEDURE — 84100 ASSAY OF PHOSPHORUS: CPT | Performed by: EMERGENCY MEDICINE

## 2024-06-08 PROCEDURE — 94760 N-INVAS EAR/PLS OXIMETRY 1: CPT

## 2024-06-08 PROCEDURE — 84145 PROCALCITONIN (PCT): CPT

## 2024-06-08 PROCEDURE — 82330 ASSAY OF CALCIUM: CPT | Performed by: EMERGENCY MEDICINE

## 2024-06-08 PROCEDURE — 94640 AIRWAY INHALATION TREATMENT: CPT

## 2024-06-08 PROCEDURE — 99291 CRITICAL CARE FIRST HOUR: CPT | Performed by: INTERNAL MEDICINE

## 2024-06-08 PROCEDURE — 83735 ASSAY OF MAGNESIUM: CPT | Performed by: EMERGENCY MEDICINE

## 2024-06-08 PROCEDURE — 94668 MNPJ CHEST WALL SBSQ: CPT

## 2024-06-08 PROCEDURE — 82805 BLOOD GASES W/O2 SATURATION: CPT | Performed by: INTERNAL MEDICINE

## 2024-06-08 PROCEDURE — 85027 COMPLETE CBC AUTOMATED: CPT | Performed by: EMERGENCY MEDICINE

## 2024-06-08 PROCEDURE — 94003 VENT MGMT INPAT SUBQ DAY: CPT

## 2024-06-08 RX ORDER — LORAZEPAM 2 MG/ML
2 INJECTION INTRAMUSCULAR ONCE
Status: COMPLETED | OUTPATIENT
Start: 2024-06-08 | End: 2024-06-08

## 2024-06-08 RX ORDER — LORAZEPAM 2 MG/ML
INJECTION INTRAMUSCULAR
Status: COMPLETED
Start: 2024-06-08 | End: 2024-06-08

## 2024-06-08 RX ORDER — LORAZEPAM 2 MG/ML
INJECTION INTRAMUSCULAR
Status: DISPENSED
Start: 2024-06-08 | End: 2024-06-09

## 2024-06-08 RX ORDER — FUROSEMIDE 10 MG/ML
60 INJECTION INTRAMUSCULAR; INTRAVENOUS
Status: DISCONTINUED | OUTPATIENT
Start: 2024-06-08 | End: 2024-06-08

## 2024-06-08 RX ADMIN — LORAZEPAM 2 MG: 2 INJECTION INTRAMUSCULAR at 06:46

## 2024-06-08 RX ADMIN — LEVALBUTEROL HYDROCHLORIDE 1.25 MG: 1.25 SOLUTION RESPIRATORY (INHALATION) at 14:08

## 2024-06-08 RX ADMIN — GUAIFENESIN 200 MG: 200 SOLUTION ORAL at 14:37

## 2024-06-08 RX ADMIN — FUROSEMIDE 60 MG: 10 INJECTION, SOLUTION INTRAMUSCULAR; INTRAVENOUS at 08:27

## 2024-06-08 RX ADMIN — CEFEPIME 2000 MG: 2 INJECTION, POWDER, FOR SOLUTION INTRAVENOUS at 12:54

## 2024-06-08 RX ADMIN — CEFEPIME 2000 MG: 2 INJECTION, POWDER, FOR SOLUTION INTRAVENOUS at 20:13

## 2024-06-08 RX ADMIN — GUAIFENESIN 200 MG: 200 SOLUTION ORAL at 05:02

## 2024-06-08 RX ADMIN — CEFEPIME 2000 MG: 2 INJECTION, POWDER, FOR SOLUTION INTRAVENOUS at 05:02

## 2024-06-08 RX ADMIN — LEVALBUTEROL HYDROCHLORIDE 1.25 MG: 1.25 SOLUTION RESPIRATORY (INHALATION) at 21:25

## 2024-06-08 RX ADMIN — GUAIFENESIN 200 MG: 200 SOLUTION ORAL at 08:27

## 2024-06-08 RX ADMIN — LEVALBUTEROL HYDROCHLORIDE 1.25 MG: 1.25 SOLUTION RESPIRATORY (INHALATION) at 07:30

## 2024-06-08 RX ADMIN — APIXABAN 5 MG: 5 TABLET, FILM COATED ORAL at 08:22

## 2024-06-08 RX ADMIN — GUAIFENESIN 200 MG: 200 SOLUTION ORAL at 20:17

## 2024-06-08 RX ADMIN — FLUOXETINE 10 MG: 10 CAPSULE ORAL at 08:22

## 2024-06-08 RX ADMIN — ACETAMINOPHEN 650 MG: 650 SUSPENSION ORAL at 20:17

## 2024-06-08 RX ADMIN — LORAZEPAM 2 MG: 2 INJECTION INTRAMUSCULAR; INTRAVENOUS at 06:46

## 2024-06-08 RX ADMIN — LEVALBUTEROL HYDROCHLORIDE 1.25 MG: 1.25 SOLUTION RESPIRATORY (INHALATION) at 03:34

## 2024-06-08 RX ADMIN — CHLORHEXIDINE GLUCONATE 15 ML: 1.2 RINSE ORAL at 20:17

## 2024-06-08 RX ADMIN — CHLORHEXIDINE GLUCONATE 15 ML: 1.2 RINSE ORAL at 08:22

## 2024-06-08 RX ADMIN — APIXABAN 5 MG: 5 TABLET, FILM COATED ORAL at 17:05

## 2024-06-08 NOTE — NURSING NOTE
Pt became bradycardic and hypoxic, pt was diaphoretic and having seizure like activity. RN bagging patient with CCAP and Respiratory at bedside. Pt recovered after 5 minutes with suction and bagging. RN and RT at bedside suctioning patient, 10 minutes prior.

## 2024-06-08 NOTE — PROGRESS NOTES
Formerly [facility], 1928–1983  Progress Note  Name: Hemanth Swanson I  MRN: [id number]  Unit/Bed#: ICU 12 I Date of Admission: 5/30/2024   Date of Service: 6/8/2024 I Hospital Day: 9    Assessment & Plan   Seizure-like activity (HCC)  Assessment & Plan  2x seizure activity, went bradycardic and hypoxic, unsure if seizure activity causes bradycardia/hypoxia or hypoxia/bradycardia causes seizure activity  05/31-06/01 overnight, pulseless asystole without hypoxia, s/p 2 x of epi, bicarb, and calcium, PEA on monitor, ROSC achieved with clearance of secretions, followed byseizure like activity, ativan 2mg given, seizure activity aborted  06/02-brooke to 35, hypoxic to 50%, suctioned, noted to have seizure-like activity, 2mg Ativan given   EEG resulted as normal, activity believed to be due to hypoxia    Plan:  Believed to not be seizure activity, manage secretions and try to keep oxygen sats above 90%.  Reach out to Neurology again due to appearance of activity looking more seizure-like (change in pattern)    Bradycardia  Assessment & Plan  Concern for increased vagal stimulation resulting hypoxic bradycardia 2/2 to increased secretions and position of trach with mucus plugging  Patient has had multiple episodes of bradycardia into 20-30s due to increased trach secretions with mucus plugging, worsening depended on trach position, thought to stimulate vagal bradycardia. Of note on 5/31, 1x episode resulted in  pulseless asystole without hypoxia, s/p 2 x of epi, bicarb, and calcium, PEA on monitor, ROSC achieved with clearance of secretions.   S/p tracheostomy in 9/2023 at [facility], initially 8.0 cuffed,downsized to a Shiley 4 then capping trial.  Several days into the capping trial he developed aspiration pneumonia and ended up being hospitalized at [facility] with a upsized tracheostomy to 8.0, he had a bronchoscopy performed and required full ventilator support at that time.  Trach exchanged to 6.0  cuffed on 3/18/2024  Home Vent A/ VC withTV 550, RR 24, MV 12-14 L, 100% spontaneous triggers  S/p Bronch on 06/01, exchanged Trach for 6XL during bronch, no further bradycardic events occurred throughout the day  06/02-brooke to 35, hypoxic to 50%, suctioned, noted to have seizure-like activity, 2mg Ativan given   Appreciate neurology consults, shaking believed to be 2/2 hypoxia, no findings on EEG indicating seizure activity.  Bronchoscopy done 6/4, and 6/5 without significant benefit to secretions    Plan:  Continue managing secretions.  Attempting scopolamine patch, with improvement of oropharyngeal secretions    Neuromuscular disorder ([facility])  Assessment & Plan  Hx of Testicular CA, S/P 3 cycles CDDP/ ,  then developed neuromuscular syndrome including urinary incontinence and resp failure resulting in vent-dependent, has not established with Neuro  Neurology outpatient on 7/17    Ventilator dependent ([facility])  Assessment & Plan  Hx of Testicular CA, S/P 3 cycles CDDP/ ,  then developed neuromuscular syndrome including urinary incontinence and resp failure resulting in vent-dependent, has not established with Neuro  History of anoxic brain injury and unknown neuromuscular disorder  S/p tracheostomy in 9/2023 at [facility], initially 8.0 cuffed,downsized to a Shiley 4 then capping trial.  Several days into the capping trial he developed aspiration pneumonia and ended up being hospitalized at [facility] with a upsized tracheostomy to 8.0, he had a bronchoscopy performed and required full ventilator support at that time.  Trach exchanged to 6.0 cuffed on 3/18/2024  Ventilator dependent 24/7  home vent settings: 18/500/40%/6  Current Vent settings: 18/450/40%/10      Impaired physical mobility  Assessment & Plan  -PT/OT for have signed off for now, possibly due to hypoxic episodes (reasoning unclear from notes)    Status post tracheostomy ([facility])  Assessment & Plan  S/p tracheostomy in 9/2023 at [facility]  [place], initially 8.0 cuffed,downsized to a Shiley 4 then capping trial.  Several days into the capping trial he developed aspiration pneumonia and ended up being hospitalized at [facility] with a upsized tracheostomy to 8.0, he had a bronchoscopy performed and required full ventilator support at that time.  Trach exchanged to 6.0 cuffed on 3/18/2024  Home Vent A/ VC withTV 550, RR 24, MV 12-14 L, 100% spontaneous triggers  Concern for increased vagal stimulation resulting hypoxic bradycardia 2/2 to increased secretions and position of trach with mucus plugging  Current Trach is a  6XL   Several bronchs were performed between 6/1-6/5 with no significant relief of secretions   Many hypoxic events yesterday, no events overnight    Plan:  Day 5/7 cefepime  Continuing scopolamine  Continue lasix    S/P percutaneous endoscopic gastrostomy (PEG) tube placement (HCC)  Assessment & Plan  -Monitor for s/s infection      Depression  Assessment & Plan  Continue fluoxetine    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[facility])  Assessment & Plan  Testicular cancer s/p left orchiectomy on 12/2022  Repeat CT scan in January 2023 with enlarging lymph node.  Began Chemo in March 2023: Etoposide and Cisplatin with plan for 4 cycles, 5 days every 21 days  Did not complete chemo treatment due to adverse effects  After 3rd cycle reported double vision, labile affect and right sided weakness and therefore chemotherapy was stopped on 5/26/23  Outpatient follow up with Heme/Onc     * Sepsis ([facility])  Assessment & Plan  Presented from PCP with hypoxia with desat in 70s and increased WOB, admitted for, Multilobar PNA on dependent vent   Sepsis Criteria meet: due to leukocytosis and tachypnea.  Suspect the source of pneumonia  CT PE chest: multilobar PNA, most severe in [place], aspiration not excluded, bilateral atelectasis, NO PE  Started on Zoysn abx and deescalated to Ceftriaxone on 5/31, switched to Cefepime on 6/2  Blood cultures and Strep/Legionella Urine  "cultures negative  Sputum cultures resulted with Serratia marcescens, and Pseudomonas aeruginosa.    No results for input(s): \"PROCALCITONI\" in the last 72 hours.    Recent Labs     06/05/24  0425 06/06/24  0439 06/07/24  0453   WBC 8.70 10.26* 11.54*       Continue Cefepime 5/7  Continue Xopenex  Continue Mucinex                Disposition: Critical care    ICU Core Measures     Vented Patient  VAP Bundle  VAP bundle ordered     A: Assess, Prevent, and Manage Pain Has pain been assessed? Yes  Need for changes to pain regimen? No   B: Both Spontaneous Awakening Trials (SATs) and Spontaneous Breathing Trials (SBTs) Plan to perform spontaneous awakening trial today? Chronic vent   Plan to perform spontaneous breathing trial today? Chronic vent   Obvious barriers to extubation? Yes   C: Choice of Sedation RASS Goal: 0 Alert and Calm  Need for changes to sedation or analgesia regimen? No   D: Delirium CAM-ICU: Negative   E: Early Mobility  Plan for early mobility? No   F: Family Engagement Plan for family engagement today? Yes       Antibiotic Review: Patient on appropriate coverage based on culture data.     Review of Invasive Devices:      Central access plan:  R chest port      Prophylaxis:  VTE VTE covered by:  apixaban, Per PEG Tube, 5 mg at 06/07/24 1715       Stress Ulcer  not ordered         Significant 24hr Events     24hr events: 2 hypoxic events overnight     Subjective   Review of Systems: See HPI for Review of Systems     Objective                            Vitals I/O      Most Recent Min/Max in 24hrs   Temp 99.2 °F (37.3 °C) Temp  Min: 97.5 °F (36.4 °C)  Max: 100.3 °F (37.9 °C)   Pulse 73 Pulse  Min: 50  Max: 87   Resp (!) 25 Resp  Min: 18  Max: 25   BP 97/63 BP  Min: 97/63  Max: 119/88   O2 Sat 96 % SpO2  Min: 51 %  Max: 100 %      Intake/Output Summary (Last 24 hours) at 6/8/2024 0792  Last data filed at 6/8/2024 0502  Gross per 24 hour   Intake 1934 ml   Output 1750 ml   Net 184 ml       Diet " Enteral/Parenteral; Tube Feeding No Oral Diet; Jevity 1.5; Continuous; 70; 200; Water; Every 4 hours    Invasive Monitoring           Physical Exam   Physical Exam  Vitals and nursing note reviewed.   Eyes:      Extraocular Movements: Extraocular movements intact.   Skin:     General: Skin is warm.   HENT:      Head: Normocephalic and atraumatic.      Right Ear: Hearing normal.      Left Ear: Hearing normal.      Nose: No nasal deformity or congestion.   Neck:      Trachea: Tracheostomy present.   Cardiovascular:      Rate and Rhythm: Normal rate and regular rhythm.      Heart sounds: Normal heart sounds.   Musculoskeletal:      Right lower leg: No edema.      Left lower leg: No edema.   Abdominal:      Palpations: Abdomen is soft.      Tenderness: There is no abdominal tenderness. There is no guarding.   Constitutional:       Appearance: He is well-developed. He is diaphoretic.      Interventions: He is intubated.   Pulmonary:      Effort: He is intubated.      Comments: On ventilator   Neurological:      Mental Status: Mental status is at baseline.      Comments: Drowsy at time of exam   Genitourinary/Anorectal:  Sheppard present.          Diagnostic Studies      EKG:   Imaging:  I have personally reviewed pertinent reports.   and I have personally reviewed pertinent films in PACS     Medications:  Scheduled PRN   apixaban, 5 mg, BID  cefepime, 2,000 mg, Q8H  chlorhexidine, 15 mL, Q12H ISAEL  FLUoxetine, 10 mg, Daily  furosemide, 60 mg, BID (diuretic)  guaiFENesin, 200 mg, Q6H  levalbuterol, 1.25 mg, Q6H  scopolamine, 1 patch, Q72H      acetaminophen, 650 mg, Q8H PRN  atropine, 0.5 mg, Once PRN       Continuous          Labs:    CBC    Recent Labs     06/07/24  0453 06/08/24  0514   WBC 11.54* 13.41*   HGB 13.2 13.6   HCT 40.7 42.0    259     BMP    Recent Labs     06/07/24  0453 06/08/24  0514   SODIUM 135 136   K 3.7 3.7   CL 95* 94*   CO2 33* 33*   AGAP 7 9   BUN 16 20   CREATININE 0.60 0.56*   CALCIUM 9.6  9.7       Coags    No recent results     Additional Electrolytes  Recent Labs     06/08/24  0514   MG 2.4   PHOS 3.6   CAIONIZED 1.11*          Blood Gas    No recent results  No recent results LFTs  Recent Labs     06/08/24  0514   ALT 47   AST 29   ALKPHOS 118*   ALB 4.2   TBILI 0.59       Infectious  No recent results  Glucose  Recent Labs     06/07/24  0453 06/08/24  0514   GLUC 128 101               Rachel Sharma MD

## 2024-06-08 NOTE — PLAN OF CARE
Problem: Potential for Falls  Goal: Patient will remain free of falls  Description: INTERVENTIONS:  - Educate patient/family on patient safety including physical limitations  - Instruct patient to call for assistance with activity   - Consult OT/PT to assist with strengthening/mobility   - Keep Call bell within reach  - Keep bed low and locked with side rails adjusted as appropriate  - Keep care items and personal belongings within reach  - Initiate and maintain comfort rounds  - Apply yellow socks and bracelet for high fall risk patients  - Consider moving patient to room near nurses station  Outcome: Progressing     Problem: Prexisting or High Potential for Compromised Skin Integrity  Goal: Skin integrity is maintained or improved  Description: INTERVENTIONS:  - Identify patients at risk for skin breakdown  - Assess and monitor skin integrity  - Assess and monitor nutrition and hydration status  - Monitor labs   - Assess for incontinence   - Turn and reposition patient  - Assist with mobility/ambulation  - Relieve pressure over bony prominences  - Avoid friction and shearing  - Provide appropriate hygiene as needed including keeping skin clean and dry  - Evaluate need for skin moisturizer/barrier cream  - Collaborate with interdisciplinary team   - Patient/family teaching  - Consider wound care consult   Outcome: Progressing     Problem: Nutrition/Hydration-ADULT  Goal: Nutrient/Hydration intake appropriate for improving, restoring or maintaining nutritional needs  Description: Monitor and assess patient's nutrition/hydration status for malnutrition. Collaborate with interdisciplinary team and initiate plan and interventions as ordered.  Monitor patient's weight and dietary intake as ordered or per policy. Utilize nutrition screening tool and intervene as necessary. Determine patient's food preferences and provide high-protein, high-caloric foods as appropriate.     INTERVENTIONS:  - Monitor oral intake, urinary  output, labs, and treatment plans  - Assess nutrition and hydration status and recommend course of action  - Evaluate amount of meals eaten  - Assist patient with eating if necessary   - Allow adequate time for meals  - Recommend/ encourage appropriate diets, oral nutritional supplements, and vitamin/mineral supplements  - Order, calculate, and assess calorie counts as needed  - Recommend, monitor, and adjust tube feedings and TPN/PPN based on assessed needs  - Assess need for intravenous fluids  - Provide specific nutrition/hydration education as appropriate  - Include patient/family/caregiver in decisions related to nutrition  Outcome: Progressing     Problem: PAIN - ADULT  Goal: Verbalizes/displays adequate comfort level or baseline comfort level  Description: Interventions:  - Encourage patient to monitor pain and request assistance  - Assess pain using appropriate pain scale  - Administer analgesics based on type and severity of pain and evaluate response  - Implement non-pharmacological measures as appropriate and evaluate response  - Consider cultural and social influences on pain and pain management  - Notify physician/advanced practitioner if interventions unsuccessful or patient reports new pain  Outcome: Progressing     Problem: INFECTION - ADULT  Goal: Absence or prevention of progression during hospitalization  Description: INTERVENTIONS:  - Assess and monitor for signs and symptoms of infection  - Monitor lab/diagnostic results  - Monitor all insertion sites, i.e. indwelling lines, tubes, and drains  - Monitor endotracheal if appropriate and nasal secretions for changes in amount and color  - [place] appropriate cooling/warming therapies per order  - Administer medications as ordered  - Instruct and encourage patient and family to use good hand hygiene technique  - Identify and instruct in appropriate isolation precautions for identified infection/condition  Outcome: Progressing  Goal: Absence of  fever/infection during neutropenic period  Description: INTERVENTIONS:  - Monitor WBC    Outcome: Progressing     Problem: DISCHARGE PLANNING  Goal: Discharge to home or other facility with appropriate resources  Description: INTERVENTIONS:  - Identify barriers to discharge w/patient and caregiver  - Arrange for needed discharge resources and transportation as appropriate  - Identify discharge learning needs (meds, wound care, etc.)  - Arrange for interpretive services to assist at discharge as needed  - Refer to Case Management Department for coordinating discharge planning if the patient needs post-hospital services based on physician/advanced practitioner order or complex needs related to functional status, cognitive ability, or social support system  Outcome: Progressing     Problem: Knowledge Deficit  Goal: Patient/family/caregiver demonstrates understanding of disease process, treatment plan, medications, and discharge instructions  Description: Complete learning assessment and assess knowledge base.  Interventions:  - Provide teaching at level of understanding  - Provide teaching via preferred learning methods  Outcome: Progressing

## 2024-06-09 LAB
ANION GAP SERPL CALCULATED.3IONS-SCNC: 8 MMOL/L (ref 4–13)
ARTERIAL PATENCY WRIST A: YES
ARTERIAL PATENCY WRIST A: YES
BASE EXCESS BLDA CALC-SCNC: 4.7 MMOL/L
BASE EXCESS BLDA CALC-SCNC: 6.7 MMOL/L
BASOPHILS # BLD AUTO: 0.06 THOUSANDS/ÂΜL (ref 0–0.1)
BASOPHILS NFR BLD AUTO: 1 % (ref 0–1)
BUN SERPL-MCNC: 24 MG/DL (ref 5–25)
CALCIUM SERPL-MCNC: 9.5 MG/DL (ref 8.4–10.2)
CHLORIDE SERPL-SCNC: 94 MMOL/L (ref 96–108)
CO2 SERPL-SCNC: 35 MMOL/L (ref 21–32)
CREAT SERPL-MCNC: 0.58 MG/DL (ref 0.6–1.3)
EOSINOPHIL # BLD AUTO: 0.06 THOUSAND/ÂΜL (ref 0–0.61)
EOSINOPHIL NFR BLD AUTO: 1 % (ref 0–6)
ERYTHROCYTE [DISTWIDTH] IN BLOOD BY AUTOMATED COUNT: 15.5 % (ref 11.6–15.1)
GFR SERPL CREATININE-BSD FRML MDRD: 130 ML/MIN/1.73SQ M
GLUCOSE SERPL-MCNC: 110 MG/DL (ref 65–140)
HCO3 BLDA-SCNC: 29.8 MMOL/L (ref 22–28)
HCO3 BLDA-SCNC: 35 MMOL/L (ref 22–28)
HCT VFR BLD AUTO: 40.3 % (ref 36.5–49.3)
HGB BLD-MCNC: 12.9 G/DL (ref 12–17)
HOROWITZ INDEX BLDA+IHG-RTO: 40 MM[HG]
I-TIME: 1
IMM GRANULOCYTES # BLD AUTO: 0.06 THOUSAND/UL (ref 0–0.2)
IMM GRANULOCYTES NFR BLD AUTO: 1 % (ref 0–2)
LYMPHOCYTES # BLD AUTO: 1.42 THOUSANDS/ÂΜL (ref 0.6–4.47)
LYMPHOCYTES NFR BLD AUTO: 13 % (ref 14–44)
MCH RBC QN AUTO: 29.1 PG (ref 26.8–34.3)
MCHC RBC AUTO-ENTMCNC: 32 G/DL (ref 31.4–37.4)
MCV RBC AUTO: 91 FL (ref 82–98)
MONOCYTES # BLD AUTO: 1 THOUSAND/ÂΜL (ref 0.17–1.22)
MONOCYTES NFR BLD AUTO: 9 % (ref 4–12)
NEUTROPHILS # BLD AUTO: 8.27 THOUSANDS/ÂΜL (ref 1.85–7.62)
NEUTS SEG NFR BLD AUTO: 75 % (ref 43–75)
NRBC BLD AUTO-RTO: 0 /100 WBCS
O2 CT BLDA-SCNC: 18.9 ML/DL (ref 16–23)
O2 CT BLDA-SCNC: 19.6 ML/DL (ref 16–23)
OTHER FIO2: 100 %
OXYHGB MFR BLDA: 94.3 % (ref 94–97)
OXYHGB MFR BLDA: 97.7 % (ref 94–97)
PCO2 BLDA: 46.1 MM HG (ref 36–44)
PCO2 BLDA: 66.5 MM HG (ref 36–44)
PEEP RESPIRATORY: 6 CM[H2O]
PH BLDA: 7.34 [PH] (ref 7.35–7.45)
PH BLDA: 7.43 [PH] (ref 7.35–7.45)
PLATELET # BLD AUTO: 251 THOUSANDS/UL (ref 149–390)
PMV BLD AUTO: 9.6 FL (ref 8.9–12.7)
PO2 BLDA: 105.3 MM HG (ref 75–129)
PO2 BLDA: 95.1 MM HG (ref 75–129)
POTASSIUM SERPL-SCNC: 3.5 MMOL/L (ref 3.5–5.3)
PROCALCITONIN SERPL-MCNC: 0.11 NG/ML
RBC # BLD AUTO: 4.43 MILLION/UL (ref 3.88–5.62)
SODIUM SERPL-SCNC: 137 MMOL/L (ref 135–147)
SPECIMEN SOURCE: ABNORMAL
SPECIMEN SOURCE: ABNORMAL
VENT AC: 12
VENT- AC: AC
VT SETTING VENT: 500 ML
WBC # BLD AUTO: 10.87 THOUSAND/UL (ref 4.31–10.16)

## 2024-06-09 PROCEDURE — 80048 BASIC METABOLIC PNL TOTAL CA: CPT

## 2024-06-09 PROCEDURE — 99291 CRITICAL CARE FIRST HOUR: CPT | Performed by: INTERNAL MEDICINE

## 2024-06-09 PROCEDURE — 94760 N-INVAS EAR/PLS OXIMETRY 1: CPT

## 2024-06-09 PROCEDURE — 99233 SBSQ HOSP IP/OBS HIGH 50: CPT | Performed by: PSYCHIATRY & NEUROLOGY

## 2024-06-09 PROCEDURE — 94640 AIRWAY INHALATION TREATMENT: CPT

## 2024-06-09 PROCEDURE — 85025 COMPLETE CBC W/AUTO DIFF WBC: CPT

## 2024-06-09 PROCEDURE — 84145 PROCALCITONIN (PCT): CPT

## 2024-06-09 PROCEDURE — 94003 VENT MGMT INPAT SUBQ DAY: CPT

## 2024-06-09 PROCEDURE — 94150 VITAL CAPACITY TEST: CPT

## 2024-06-09 PROCEDURE — 82805 BLOOD GASES W/O2 SATURATION: CPT

## 2024-06-09 PROCEDURE — 36600 WITHDRAWAL OF ARTERIAL BLOOD: CPT

## 2024-06-09 RX ORDER — FUROSEMIDE 10 MG/ML
40 INJECTION INTRAMUSCULAR; INTRAVENOUS DAILY
Status: DISCONTINUED | OUTPATIENT
Start: 2024-06-09 | End: 2024-06-09

## 2024-06-09 RX ORDER — LORAZEPAM 2 MG/ML
INJECTION INTRAMUSCULAR
Status: COMPLETED
Start: 2024-06-09 | End: 2024-06-09

## 2024-06-09 RX ORDER — LEVETIRACETAM 500 MG/5ML
500 INJECTION, SOLUTION, CONCENTRATE INTRAVENOUS EVERY 12 HOURS SCHEDULED
Status: DISCONTINUED | OUTPATIENT
Start: 2024-06-09 | End: 2024-06-10

## 2024-06-09 RX ORDER — LORAZEPAM 2 MG/ML
2 INJECTION INTRAMUSCULAR ONCE
Status: COMPLETED | OUTPATIENT
Start: 2024-06-09 | End: 2024-06-09

## 2024-06-09 RX ORDER — LEVETIRACETAM 500 MG/5ML
1500 INJECTION, SOLUTION, CONCENTRATE INTRAVENOUS ONCE
Status: COMPLETED | OUTPATIENT
Start: 2024-06-09 | End: 2024-06-09

## 2024-06-09 RX ORDER — LORAZEPAM 2 MG/ML
2 INJECTION INTRAMUSCULAR ONCE
Status: DISCONTINUED | OUTPATIENT
Start: 2024-06-09 | End: 2024-06-09

## 2024-06-09 RX ORDER — SENNOSIDES 8.8 MG/5ML
8.8 LIQUID ORAL ONCE
Status: COMPLETED | OUTPATIENT
Start: 2024-06-09 | End: 2024-06-09

## 2024-06-09 RX ADMIN — LORAZEPAM 2 MG: 2 INJECTION INTRAMUSCULAR at 19:45

## 2024-06-09 RX ADMIN — CEFEPIME 2000 MG: 2 INJECTION, POWDER, FOR SOLUTION INTRAVENOUS at 04:28

## 2024-06-09 RX ADMIN — GUAIFENESIN 200 MG: 200 SOLUTION ORAL at 14:37

## 2024-06-09 RX ADMIN — SCOPOLAMINE 1 PATCH: 1.5 PATCH, EXTENDED RELEASE TRANSDERMAL at 08:05

## 2024-06-09 RX ADMIN — GUAIFENESIN 200 MG: 200 SOLUTION ORAL at 09:04

## 2024-06-09 RX ADMIN — CEFEPIME 2000 MG: 2 INJECTION, POWDER, FOR SOLUTION INTRAVENOUS at 20:42

## 2024-06-09 RX ADMIN — LORAZEPAM 2 MG: 2 INJECTION INTRAMUSCULAR; INTRAVENOUS at 06:26

## 2024-06-09 RX ADMIN — CHLORHEXIDINE GLUCONATE 15 ML: 1.2 RINSE ORAL at 20:43

## 2024-06-09 RX ADMIN — LEVALBUTEROL HYDROCHLORIDE 1.25 MG: 1.25 SOLUTION RESPIRATORY (INHALATION) at 19:51

## 2024-06-09 RX ADMIN — GUAIFENESIN 200 MG: 200 SOLUTION ORAL at 04:28

## 2024-06-09 RX ADMIN — LORAZEPAM 2 MG: 2 INJECTION INTRAMUSCULAR at 06:26

## 2024-06-09 RX ADMIN — APIXABAN 5 MG: 5 TABLET, FILM COATED ORAL at 17:05

## 2024-06-09 RX ADMIN — CHLORHEXIDINE GLUCONATE 15 ML: 1.2 RINSE ORAL at 08:05

## 2024-06-09 RX ADMIN — LEVALBUTEROL HYDROCHLORIDE 1.25 MG: 1.25 SOLUTION RESPIRATORY (INHALATION) at 07:41

## 2024-06-09 RX ADMIN — GUAIFENESIN 200 MG: 200 SOLUTION ORAL at 20:43

## 2024-06-09 RX ADMIN — FLUOXETINE 10 MG: 10 CAPSULE ORAL at 08:05

## 2024-06-09 RX ADMIN — LEVETIRACETAM 1500 MG: 100 INJECTION, SOLUTION INTRAVENOUS at 10:49

## 2024-06-09 RX ADMIN — LORAZEPAM 2 MG: 2 INJECTION INTRAMUSCULAR; INTRAVENOUS at 19:45

## 2024-06-09 RX ADMIN — CEFEPIME 2000 MG: 2 INJECTION, POWDER, FOR SOLUTION INTRAVENOUS at 11:19

## 2024-06-09 RX ADMIN — LEVETIRACETAM 500 MG: 100 INJECTION, SOLUTION INTRAVENOUS at 20:43

## 2024-06-09 RX ADMIN — LEVALBUTEROL HYDROCHLORIDE 1.25 MG: 1.25 SOLUTION RESPIRATORY (INHALATION) at 01:02

## 2024-06-09 RX ADMIN — SENNOSIDES 8.8 MG: 8.8 SYRUP ORAL at 09:20

## 2024-06-09 RX ADMIN — LEVALBUTEROL HYDROCHLORIDE 1.25 MG: 1.25 SOLUTION RESPIRATORY (INHALATION) at 15:53

## 2024-06-09 RX ADMIN — APIXABAN 5 MG: 5 TABLET, FILM COATED ORAL at 08:05

## 2024-06-09 NOTE — PLAN OF CARE
Problem: Potential for Falls  Goal: Patient will remain free of falls  Description: INTERVENTIONS:  - Educate patient/family on patient safety including physical limitations  - Instruct patient to call for assistance with activity   - Consult OT/PT to assist with strengthening/mobility   - Keep Call bell within reach  - Keep bed low and locked with side rails adjusted as appropriate  - Keep care items and personal belongings within reach  - Initiate and maintain comfort rounds  - Apply yellow socks and bracelet for high fall risk patients  - Consider moving patient to room near nurses station  Outcome: Progressing     Problem: Prexisting or High Potential for Compromised Skin Integrity  Goal: Skin integrity is maintained or improved  Description: INTERVENTIONS:  - Identify patients at risk for skin breakdown  - Assess and monitor skin integrity  - Assess and monitor nutrition and hydration status  - Monitor labs   - Assess for incontinence   - Turn and reposition patient  - Assist with mobility/ambulation  - Relieve pressure over bony prominences  - Avoid friction and shearing  - Provide appropriate hygiene as needed including keeping skin clean and dry  - Evaluate need for skin moisturizer/barrier cream  - Collaborate with interdisciplinary team   - Patient/family teaching  - Consider wound care consult   Outcome: Progressing     Problem: Nutrition/Hydration-ADULT  Goal: Nutrient/Hydration intake appropriate for improving, restoring or maintaining nutritional needs  Description: Monitor and assess patient's nutrition/hydration status for malnutrition. Collaborate with interdisciplinary team and initiate plan and interventions as ordered.  Monitor patient's weight and dietary intake as ordered or per policy. Utilize nutrition screening tool and intervene as necessary. Determine patient's food preferences and provide high-protein, high-caloric foods as appropriate.     INTERVENTIONS:  - Monitor oral intake, urinary  output, labs, and treatment plans  - Assess nutrition and hydration status and recommend course of action  - Evaluate amount of meals eaten  - Assist patient with eating if necessary   - Allow adequate time for meals  - Recommend/ encourage appropriate diets, oral nutritional supplements, and vitamin/mineral supplements  - Order, calculate, and assess calorie counts as needed  - Recommend, monitor, and adjust tube feedings and TPN/PPN based on assessed needs  - Assess need for intravenous fluids  - Provide specific nutrition/hydration education as appropriate  - Include patient/family/caregiver in decisions related to nutrition  Outcome: Progressing     Problem: PAIN - ADULT  Goal: Verbalizes/displays adequate comfort level or baseline comfort level  Description: Interventions:  - Encourage patient to monitor pain and request assistance  - Assess pain using appropriate pain scale  - Administer analgesics based on type and severity of pain and evaluate response  - Implement non-pharmacological measures as appropriate and evaluate response  - Consider cultural and social influences on pain and pain management  - Notify physician/advanced practitioner if interventions unsuccessful or patient reports new pain  Outcome: Progressing     Problem: INFECTION - ADULT  Goal: Absence or prevention of progression during hospitalization  Description: INTERVENTIONS:  - Assess and monitor for signs and symptoms of infection  - Monitor lab/diagnostic results  - Monitor all insertion sites, i.e. indwelling lines, tubes, and drains  - Monitor endotracheal if appropriate and nasal secretions for changes in amount and color  - [place] appropriate cooling/warming therapies per order  - Administer medications as ordered  - Instruct and encourage patient and family to use good hand hygiene technique  - Identify and instruct in appropriate isolation precautions for identified infection/condition  Outcome: Progressing  Goal: Absence of  fever/infection during neutropenic period  Description: INTERVENTIONS:  - Monitor WBC    Outcome: Progressing     Problem: DISCHARGE PLANNING  Goal: Discharge to home or other facility with appropriate resources  Description: INTERVENTIONS:  - Identify barriers to discharge w/patient and caregiver  - Arrange for needed discharge resources and transportation as appropriate  - Identify discharge learning needs (meds, wound care, etc.)  - Arrange for interpretive services to assist at discharge as needed  - Refer to Case Management Department for coordinating discharge planning if the patient needs post-hospital services based on physician/advanced practitioner order or complex needs related to functional status, cognitive ability, or social support system  Outcome: Progressing     Problem: Knowledge Deficit  Goal: Patient/family/caregiver demonstrates understanding of disease process, treatment plan, medications, and discharge instructions  Description: Complete learning assessment and assess knowledge base.  Interventions:  - Provide teaching at level of understanding  - Provide teaching via preferred learning methods  Outcome: Progressing

## 2024-06-09 NOTE — PROGRESS NOTES
Progress Note  Name: Hemanth Swanson I  MRN: [id number]  Unit/Bed#: ICU 12 I Date of Admission: 5/30/2024   Date of Service: 6/9/2024 I Hospital Day: 10    Assessment & Plan   Seizure-like activity (HCC)  Assessment & Plan  39 y.o. male with vent dependent trach with a previous history of PE on Eliquis referred to the ED by outpatient family medicine due to intermittent hypoxia to the 70s and high concern forpneumonia. Patient diagnosed with sepsis due to multifocal PNA and is currently being treated with ceftriaxone. WBC improving from 17-->7.9. Between 5/31 and 6/2, patient has had 4 events of bradycardia +/- hypoxia with the last two events concerning for seizure like activity as detailed below in HPI.  Both on yesterday's and today neuro exam, and through near continue observation by the critical care staff for epileptic events.     6/9  Neurology asked to reevaluate the patient since he has had 3-4 episodes for brief.  Of time noted to have tonic shaking of the upper extremities with eye rolling, and for a few minutes subsequently appears altered.  Critical team concerned about seizure-like activity.  Patient was recently worked up with an EEG which was normal and patient was felt to have cardiogenic/hypoperfusion syncope in the setting of bradycardia and hypoxia.  Patient not felt to have the same during these recent episodes.  His neurological exam is limited but patient does obey commands, has diffuse weakness of all 4 extremities.  Concern for seizures with recurrent episodes possible further evaluation with video EEG versus empirically treating the patient with IV Keppra until medically stable.  Discussed with critical care team, agreed to start IV Keppra 1 g followed by 500 twice daily and if symptoms fail to resolve we will consider transfer to [place] for video EEG monitoring.  Currently video EEG not available at the [facility] since it is in use for another patient.      Neuromuscular disorder  ([facility])  Assessment & Plan  6/3/2024: New to this neuro team today seen in follow-up after initial neuro consult done yesterday is this 39 y.o. male with vent dependent trach and peg (9/2023), who has an undiagnosed neuromuscular syndrome, despite both inpatient and outpatient workups.  Question also whether or not he may have sustained an anoxic brain injury (not evident clinically nor on prior MRI).  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  He also has a history of , h/o PE on Eliquis, depression and anxiety.  He has been seen in our outpatient neurology clinics and also been seen by [facility] neurology.   This patient was recently referred to the ED by outpatient family medicine due to intermittent hypoxia to the 70s and high concern forpneumonia. Patient diagnosed with sepsis due to multifocal PNA and is currently being treated with ceftriaxone. WBC improving from 17-->7.9. Between 5/31 and 6/2, patient has had 4 events of bradycardia +/- hypoxia with the last two events concerning for seizure like activity as detailed below in HPI, see the note above.  Concerning the neuromuscular abnormalities today his neuro exam was consistent with that done previously and detailed below with patient alert and oriented, following multistep commands, proximal>distal weakness, asymmetric neuropathy.   After extensive record review of our colleagues notes from this admission as well as previous inpatient and outpatient visits and workup, see the HPI for details, his exam consistent with large fiber neuropathy, and possible myopathy vs deconditioning. Additional work-up warranted as patient is scheduled for outpatient neuromuscular evaluation.   In regards to recent events with  concern for seizure like activity, higher suspicion for hypoperfusion/cardiogenic syncope in the setting of bradycardia +/- hypoxia. No underlying pathology that is known at this time that would make the patient more predisposed to seizures; however reasonable to repeat MRI brain w wo as the last one was completed in 11/2023 at [facility] and was motion degraded.  Will also include IAC for reevaluation of prior bilateral IAC enhancement.    Plan:ENS2 sent to [facility] (which includes Ma2/Ta - ab most associated with testicular cancer; [facility] panel did not include this)  Obtain repeat MRI brain IAC w wo      Patient is scheduled with outpatient Neuromuscular Dr. Dc on 7/17/24.         Subjective:   Patient seen at bedside, seems awake and alert at this time, tracks objects, follow simple commands, and denies any headaches.  Apparently has been witnessed to have 3-4 episodes of altered mentation with tonic posturing of his upper extremities and eye rolling movements with concern for possible seizures in spite of initial EEG being normal.    ROS: 12 system cued query was unchanged from org. consult note.    Vitals:   Vitals:    06/09/24 1322   BP:    Pulse:    Resp:    Temp:    SpO2: 94%   ,Body mass index is 29.97 kg/m².    MEDS:    Current Facility-Administered Medications:     acetaminophen (TYLENOL) oral suspension 650 mg, 650 mg, Oral, Q8H PRN, Quinton Hector MD, 650 mg at 06/08/24 2017    apixaban (ELIQUIS) tablet 5 mg, 5 mg, Per PEG Tube, BID, Merced Washburn MD, 5 mg at 06/09/24 0805    Atropine Sulfate injection 0.5 mg, 0.5 mg, Intravenous, Once PRN, Lilian Rodriguez DO    ceFEPime (MAXIPIME) 2,000 mg in dextrose 5 % 50 mL IVPB, 2,000 mg, Intravenous, Q8H, Rachel Sharma MD, Last Rate: 100 mL/hr at 06/09/24 1119, 2,000 mg at 06/09/24 1119    chlorhexidine (PERIDEX) 0.12 % oral rinse 15 mL, 15 mL, Mouth/Throat, Q12H ISAEL, Keny Ward DO, 15 mL at 06/09/24 0805    FLUoxetine (PROzac)  capsule 10 mg, 10 mg, Per G Tube, Daily, Lilian Brown Michael, DO, 10 mg at 06/09/24 0805    guaiFENesin (ROBITUSSIN) oral liquid 200 mg, 200 mg, Oral, Q6H, AMBER Oro, 200 mg at 06/09/24 0904    levalbuterol (XOPENEX) inhalation solution 1.25 mg, 1.25 mg, Nebulization, Q6H, AMBER Oro, 1.25 mg at 06/09/24 0741    scopolamine (TRANSDERM-SCOP) 1 mg/3 days TD 72 hr patch 1 patch, 1 patch, Transdermal, Q72H, AMBER Tucker, 1 patch at 06/09/24 0805    Physical Exam:  General appearance: alert, appears stated age and cooperative  Head: Normocephalic, without obvious abnormality, atraumatic    Neurologic:  He seems awake and alert, tracks objects, pupils equally reactive, visual fields full to threat, does attempt to speak but incomprehensible and hypophonic, obeys simple commands, with diminished strength in all 4 extremities as well as DTRs.    Lab Results: I have personally reviewed pertinent reports.  Imaging Studies: I have personally reviewed pertinent reports.          6/9/2024,1:39 PM    Dictation voice to text software has been used in the creation of this document. Please consider this in light of any contextual or grammatical errors.

## 2024-06-09 NOTE — UTILIZATION REVIEW
"Continued Stay Review    Date: 6/9/24                          Current Patient Class: inpatient   Current Level of Care: ICU    HPI:39 y.o. male initially admitted on 5/30/24: \"pmhx sig for unspecified neuromuscular d/o, chronic respiratory failure s/p trach in 9/2023, ? Hx of anoxic brain injury, PE (9/2023) on AC, depression who presented on 5/30 w/ hypoxia. Pt initially presented to PCP who referred to ED in setting of hypoxia. He is on a chronic vent at home. Pt denies viral exposure at home.\" 05/31-06/01 overnight, pulseless asystole without hypoxia, s/p 2 x of epi, bicarb, and calcium, PEA on monitor, ROSC achieved with clearance of secretions, followed byseizure like activity, ativan 2mg given, seizure activity aborted.  06/02-brooke to 35, hypoxic to 50%, suctioned, noted to have seizure-like activity, 2mg Ativan given     Assessment/Plan:     6/9/2024 .  Patient presents with  episode of seizure like activity in last 24 hours - Appeared different as compared to previous episodes eye rolling, stiffness/rigidit (hand clench), tongue movements for which he was given ativan.   On exam lungs decreased breath sounds.  Follow commands.  Limited strength.  Lungs decreased breath sounds.   Abnormal labs or imaging:  CO2 35.   Wbc 10.87  Diagnosis/Plan    neuromuscular disorder/Seizure/chronic hypoxemic respiratory failure/serratia/psuedomonas .  Start Keppra load.  Continue fluoxetine.  continued on AC/VC 18/500/8/40; SpO2 %, wean PEEP to 6 as tolerated,  decrease rate to 12.  continue levalbuterol. Hold diuretics.  Continue Cefepime - on day 6.     Continue Jevity.  Add bowel regimen    Vital Signs:   06/09/24 1500 99.3 °F (37.4 °C) 74 21 121/75 94 99 % -- -- --   06/09/24 1400 -- 80 23 Abnormal  140/92 110 100 % -- -- --   06/09/24 1322 -- -- -- -- -- 94 % 40 Ventilator --   06/09/24 1312 99.6 °F (37.6 °C) -- -- -- -- -- -- -- --   06/09/24 1300 -- 74 23 Abnormal  112/75 88 100 % -- -- --   06/09/24 1200 -- " 75 22 109/71 83 94 % -- -- --   06/09/24 1100 -- 88 32 Abnormal  113/78 90 99 % -- -- --   06/09/24 1032 -- -- -- -- -- 100 % -- -- --   06/09/24 1000 -- 83 46 Abnormal  112/72 85 98 % -- -- --   06/09/24 0900 -- 82 47 Abnormal  102/65 79 95 % -- -- --   06/09/24 0800 99.3 °F (37.4 °C) 97 30 Abnormal  107/71 84 97 % -- -- --   06/09/24 0741 -- -- -- -- -- 99 % -- Ventilator --   06/09/24 0700 -- 81 22 102/61 76 94 % -- -- --   06/09/24 0219 99.4 °F (37.4 °C) 83 31 Abnormal  111/77 90 98 % -- -- --   06/08/24 2300 99.4 °F (37.4 °C) -- -- 115/74 87 -- -- -- --   06/08/24 2200 -- 78 18 106/76 87 99 % -- -- --   06/08/24 1900 99.8 °F (37.7 °C) 85 18 115/81 93 100 % -- Ventilator --   06/08/24 1600 -- 85 18 119/83 96 100 % -- -- --   06/08/24 1550 -- -- -- -- -- 99 % -- -- --   06/08/24 1500 99 °F (37.2 °C) 89 18 111/73 88 98 % -- -- --   06/08/24 1409 -- 92 34 Abnormal  122/73 91 100 % 40 Ventilator      Pertinent Labs/Diagnostic Results:   XR chest portable ICU   Final Result by Jewel Burton MD (06/08 0748)   Bibasilar atelectasis. Left-sided pleural effusion.            Workstation performed: [id number]         XR chest portable ICU   Final Result by Sebastian Mayorga MD (06/04 1522)   Stable tubes and lines without pneumothorax or other complication.   Mild central congestion and left basilar pleural effusion as before.            Workstation performed: [id number]         XR chest portable ICU   Final Result by Rivera Matt MD (06/03 1312)      Stable left basilar patchy opacity. Effusion not excluded. Improved right-sided airspace disease            Workstation performed: [id number]         XR chest portable ICU   Final Result by Joan Mcintyre MD (06/01 0517)      Worsening bilateral consolidation which may be a combination of edema and pneumonia.      Persistent bibasilar atelectasis.            Workstation performed: [id number]         PE Study with CT abdomen & pelvis with contrast   Final Result by  Andrea Cook MD (05/30 2102)      No pulmonary embolism.      Dense consolidation in the lower lobes reidentified may represent chronic atelectasis.      New groundglass mixed with alveolar opacities in the remainder of the lung fields most severe in the left upper lobe consistent with multi lobar pneumonia, aspiration not excluded.      Interval development of mild multilevel superior plate compression deformities at T11-L2 with sclerosis suggesting chronic deformities. Correlate for focal back tenderness.            Workstation performed: [id number]         XR chest 1 view portable   ED Interpretation by Nakia Menard MD (05/30 1830)   Poor inspiratory effort, possible LLL opacification concerning for PNA. Will obtain CT chest      Final Result by Estefania Ngo MD (05/31 1412)   Low lung volumes with bilateral lower lobe opacities reflecting atelectasis versus infiltrates, progressed.            Workstation performed: [id number]         MRI brain w wo contrast    (Results Pending)      Results from last 7 days   Lab Units 06/09/24  0433 06/08/24  0514 06/07/24  0453 06/06/24  0439 06/05/24  0425   WBC Thousand/uL 10.87* 13.41* 11.54* 10.26* 8.70   HEMOGLOBIN g/dL 12.9 13.6 13.2 12.4 11.6*   HEMATOCRIT % 40.3 42.0 40.7 38.4 36.2*   PLATELETS Thousands/uL 251 259 244 227 195   TOTAL NEUT ABS Thousands/µL 8.27*  --  9.46* 7.94* 6.32         Results from last 7 days   Lab Units 06/09/24  0433 06/08/24  0514 06/07/24  0453 06/06/24  0439 06/05/24  0425 06/04/24  0442 06/03/24  0446   SODIUM mmol/L 137 136 135 136 136   < > 136   POTASSIUM mmol/L 3.5 3.7 3.7 3.9 3.9   < > 3.5   CHLORIDE mmol/L 94* 94* 95* 99 102   < > 103   CO2 mmol/L 35* 33* 33* 31 27   < > 26   ANION GAP mmol/L 8 9 7 6 7   < > 7   BUN mg/dL 24 20 16 13 11   < > 8   CREATININE mg/dL 0.58* 0.56* 0.60 0.58* 0.49*   < > 0.66   EGFR ml/min/1.73sq m 130 132 128 130 139   < > 123   CALCIUM mg/dL 9.5 9.7 9.6 9.4 8.8   < > 8.8   CALCIUM, IONIZED  mmol/L  --  1.11*  --  1.15  --   --   --    MAGNESIUM mg/dL  --  2.4  --  2.2  --   --  2.2   PHOSPHORUS mg/dL  --  3.6  --  3.5  --   --  3.7    < > = values in this interval not displayed.     Results from last 7 days   Lab Units 06/08/24  0514   AST U/L 29   ALT U/L 47   ALK PHOS U/L 118*   TOTAL PROTEIN g/dL 8.4   ALBUMIN g/dL 4.2   TOTAL BILIRUBIN mg/dL 0.59     Results from last 7 days   Lab Units 06/03/24  0727   POC GLUCOSE mg/dl 110     Results from last 7 days   Lab Units 06/09/24  0433 06/08/24  0514 06/07/24  0453 06/06/24  0439 06/05/24  0425 06/04/24  0442 06/03/24  0446   GLUCOSE RANDOM mg/dL 110 101 128 101 104 105 101     Results from last 7 days   Lab Units 06/09/24  1318 06/09/24  0612 06/04/24  1300   PH ART  7.429 7.339* 7.404   PCO2 ART mm Hg 46.1* 66.5* 37.7   PO2 ART mm Hg 105.3 95.1 67.0*   HCO3 ART mmol/L 29.8* 35.0* 23.0   BASE EXC ART mmol/L 4.7 6.7 -1.3   O2 CONTENT ART mL/dL 18.9 19.6 18.0   O2 HGB, ARTERIAL % 97.7* 94.3 89.9*   ABG SOURCE  Radial, Right Radial, Right Brachial, Left     Results from last 7 days   Lab Units 06/08/24  1354   PH SYLVESTER  7.420*   PCO2 SYLVESTER mm Hg 50.0   PO2 SYLVESTER mm Hg 33.1*   HCO3 SYLVESTER mmol/L 31.7*   BASE EXC SYLVESTER mmol/L 5.9   O2 CONTENT SYLVESTER ml/dL 12.7   O2 HGB, VENOUS % 62.3     Results from last 7 days   Lab Units 06/09/24  0433 06/08/24  0514   PROCALCITONIN ng/ml 0.11 0.11     Results from last 7 days   Lab Units 06/05/24  0425   BNP pg/mL 11       Medications:   Scheduled Medications:  apixaban, 5 mg, Per PEG Tube, BID  cefepime, 2,000 mg, Intravenous, Q8H  chlorhexidine, 15 mL, Mouth/Throat, Q12H ISAEL  FLUoxetine, 10 mg, Per G Tube, Daily  guaiFENesin, 200 mg, Oral, Q6H  levalbuterol, 1.25 mg, Nebulization, Q6H  scopolamine, 1 patch, Transdermal, Q72H    levETIRAcetam (KEPPRA) injection 1,500 mg  Dose: 1,500 mg  Freq: Once Route: IV  Start: 06/09/24 1045 End: 06/09/24 1049  LORazepam (ATIVAN) injection 2 mg  Dose: 2 mg  Freq: Once Route: IV  Start: 06/09/24  0630 End: 06/09/24 0626       Continuous IV Infusions: none      PRN Meds: not used  acetaminophen, 650 mg, Oral, Q8H PRN  atropine, 0.5 mg, Intravenous, Once PRN    Skin care Plan:   1-Protect sacrum w/Silicone foam, alex with P, change q3d and PRN, check skin q-shift.   2-Turn/reposition q2h or when medically stable for pressure re-distribution on skin.   3-Elevate heels to offload pressure.   4-Moisturize skin daily with skin nourishing cream.   5-Ehob cushion in chair when out of bed.   6-Hydraguard to bilateral heels BID and PRN.   7-Left ear: Irrigate with NSS. Pat dry. Cover wound bed with silver alginate. Apply 2x2 Silicone foam dressing to ear. Change  every other day     Discharge Plan:  to be determined.     Network Utilization Review Department  ATTENTION: Please call with any questions or concerns to [phone] and carefully listen to the prompts so that you are directed to the right person. All voicemails are confidential.   For Discharge needs, contact Care Management DC Support Team at [phone] opt. 2  Send all requests for admission clinical reviews, approved or denied determinations and any other requests to dedicated fax number below belonging to the campus where the patient is receiving treatment. List of dedicated fax numbers for the Facilities:  FACILITY NAME UR FAX NUMBER   ADMISSION DENIALS (Administrative/Medical Necessity) [phone]   DISCHARGE SUPPORT TEAM (NETWORK) [phone]   PARENT CHILD HEALTH (Maternity/NICU/Pediatrics) [phone]   [facility] [phone]   [facility] [phone]   [facility] [phone]   [facility] [phone]   [facility] [phone]   [facility] [phone]   [facility] [phone]   [facility] [phone]   [facility]  [facility] [phone]   [facility] [phone]   [facility] [phone]   [facility] [phone]

## 2024-06-09 NOTE — PROGRESS NOTES
[facility]  Progress Note  Name: Hemanth Swanson I  MRN: [id number]  Unit/Bed#: ICU 12 I Date of Admission: 5/30/2024   Date of Service: 6/9/2024 I Hospital Day: 10    Assessment & Plan   Seizure-like activity (HCC)  Assessment & Plan  2x seizure activity, went bradycardic and hypoxic, unsure if seizure activity causes bradycardia/hypoxia or hypoxia/bradycardia causes seizure activity  05/31-06/01 overnight, pulseless asystole without hypoxia, s/p 2 x of epi, bicarb, and calcium, PEA on monitor, ROSC achieved with clearance of secretions, followed byseizure like activity, ativan 2mg given, seizure activity aborted  06/02-brooke to 35, hypoxic to 50%, suctioned, noted to have seizure-like activity, 2mg Ativan given   EEG resulted as normal, activity believed to be due to hypoxia    Plan:  Believed to not be seizure activity, manage secretions and try to keep oxygen sats above 90%.  Reach out to Neurology again due to appearance of activity looking more seizure-like (change in pattern)    Neuromuscular disorder (HCC)  Assessment & Plan  Hx of Testicular CA, S/P 3 cycles CDDP/ ,  then developed neuromuscular syndrome including urinary incontinence and resp failure resulting in vent-dependent, has not established with Neuro  Neurology outpatient on 7/17    Ventilator dependent ([facility])  Assessment & Plan  Hx of Testicular CA, S/P 3 cycles CDDP/ ,  then developed neuromuscular syndrome including urinary incontinence and resp failure resulting in vent-dependent, has not established with Neuro  History of anoxic brain injury and unknown neuromuscular disorder  S/p tracheostomy in 9/2023 at [facility], initially 8.0 cuffed,downsized to a Shiley 4 then capping trial.  Several days into the capping trial he developed aspiration pneumonia and ended up being hospitalized at [facility] with a upsized tracheostomy to 8.0, he had a bronchoscopy performed and required full ventilator support at  that time.  Trach exchanged to 6.0 cuffed on 3/18/2024  Ventilator dependent 24/7  home vent settings: 18/500/40%/6  Current Vent settings: 18/500/40%/8      S/P percutaneous endoscopic gastrostomy (PEG) tube placement (HCC)  Assessment & Plan  -Monitor for s/s infection      Depression  Assessment & Plan  Continue fluoxetine             Disposition: Critical care    ICU Core Measures     Vented Patient  VAP Bundle  VAP bundle ordered     A: Assess, Prevent, and Manage Pain Has pain been assessed? Yes  Need for changes to pain regimen? No   B: Both Spontaneous Awakening Trials (SATs) and Spontaneous Breathing Trials (SBTs) Plan to perform spontaneous awakening trial today? Chronic vent   Plan to perform spontaneous breathing trial today? Chronic vent   Obvious barriers to extubation? Yes   C: Choice of Sedation RASS Goal: 0 Alert and Calm  Need for changes to sedation or analgesia regimen? No   D: Delirium CAM-ICU: Negative   E: Early Mobility  Plan for early mobility? No   F: Family Engagement Plan for family engagement today? Yes       Antibiotic Review: Patient on appropriate coverage based on culture data.     Review of Invasive Devices:      Central access plan:  Port in place      Prophylaxis:  VTE VTE covered by:  apixaban, Per PEG Tube, 5 mg at 06/08/24 1705       Stress Ulcer  not ordered         Significant 24hr Events     24hr events: 2 events overnight of the patient's hypoxia, bradycardia, and seizure like activity.     Subjective   Review of Systems: See HPI for Review of Systems     Objective                            Vitals I/O      Most Recent Min/Max in 24hrs   Temp 99.4 °F (37.4 °C) Temp  Min: 99 °F (37.2 °C)  Max: 100.3 °F (37.9 °C)   Pulse 83 Pulse  Min: 73  Max: 97   Resp (!) 31 Resp  Min: 18  Max: 34   /77 BP  Min: 97/63  Max: 156/103   O2 Sat 98 % SpO2  Min: 93 %  Max: 100 %      Intake/Output Summary (Last 24 hours) at 6/9/2024 0627  Last data filed at 6/9/2024 0428  Gross per 24 hour    Intake 2566 ml   Output 1906 ml   Net 660 ml       Diet Enteral/Parenteral; Tube Feeding No Oral Diet; Jevity 1.5; Continuous; 70; 200; Water; Every 4 hours    Invasive Monitoring           Physical Exam   Physical Exam  Vitals and nursing note reviewed.   Eyes:      Extraocular Movements: Extraocular movements intact.      Conjunctiva/sclera: Conjunctivae normal.   Skin:     General: Skin is warm.   HENT:      Head: Normocephalic and atraumatic.      Right Ear: Hearing normal.      Left Ear: Hearing normal.      Nose: No nasal deformity or congestion.      Mouth/Throat:      Mouth: Mucous membranes are moist.   Neck:      Trachea: Tracheostomy present.   Cardiovascular:      Rate and Rhythm: Normal rate and regular rhythm.      Pulses: Normal pulses.      Heart sounds: Normal heart sounds.   Musculoskeletal:      Right lower leg: No edema.      Left lower leg: No edema.   Abdominal: General: There is abdominal type feeding tube.There is no distension.      Palpations: Abdomen is soft.      Tenderness: There is no abdominal tenderness. There is no guarding.   Constitutional:       Appearance: He is well-developed. He is diaphoretic.   Pulmonary:      Effort: Pulmonary effort is normal.      Breath sounds: Normal breath sounds.   Neurological:      Mental Status: He is alert. Mental status is at baseline. He is calm.   Genitourinary/Anorectal:  Sheppard and external catheter present.          Diagnostic Studies      EKG:   Imaging:       Medications:  Scheduled PRN   apixaban, 5 mg, BID  cefepime, 2,000 mg, Q8H  chlorhexidine, 15 mL, Q12H ISAEL  FLUoxetine, 10 mg, Daily  guaiFENesin, 200 mg, Q6H  levalbuterol, 1.25 mg, Q6H  LORazepam, ,   scopolamine, 1 patch, Q72H      acetaminophen, 650 mg, Q8H PRN  atropine, 0.5 mg, Once PRN  LORazepam, ,        Continuous          Labs:    CBC    Recent Labs     06/08/24  0514 06/09/24  0433   WBC 13.41* 10.87*   HGB 13.6 12.9   HCT 42.0 40.3    251     BMP    Recent Labs      06/08/24  0514 06/09/24  0433   SODIUM 136 137   K 3.7 3.5   CL 94* 94*   CO2 33* 35*   AGAP 9 8   BUN 20 24   CREATININE 0.56* 0.58*   CALCIUM 9.7 9.5       Coags    No recent results     Additional Electrolytes  Recent Labs     06/08/24  0514   MG 2.4   PHOS 3.6   CAIONIZED 1.11*          Blood Gas    Recent Labs     06/09/24  0612   PHART 7.339*   [id number] 66.5*   PO2ART 95.1   YFN4ERS 35.0*   BEART 6.7   SOURCE Radial, Right     Recent Labs     06/08/24  1354 06/09/24  0612   PHVEN 7.420*  --    [id number] 50.0  --    PO2VEN 33.1*  --    [id number] 31.7*  --    BEVEN 5.9  --    [id number] 62.3  --    SOURCE  --  Radial, Right    LFTs  Recent Labs     06/08/24  0514   ALT 47   AST 29   ALKPHOS 118*   ALB 4.2   TBILI 0.59       Infectious  Recent Labs     06/08/24  0514 06/09/24  0433   PROCALCITONI 0.11 0.11     Glucose  Recent Labs     06/08/24  0514 06/09/24  0433   GLUC 101 110               Rachel Sharma MD

## 2024-06-09 NOTE — ASSESSMENT & PLAN NOTE
39 y.o. male with vent dependent trach with a previous history of PE on Eliquis referred to the ED by outpatient family medicine due to intermittent hypoxia to the 70s and high concern forpneumonia. Patient diagnosed with sepsis due to multifocal PNA and is currently being treated with ceftriaxone. WBC improving from 17-->7.9. Between 5/31 and 6/2, patient has had 4 events of bradycardia +/- hypoxia with the last two events concerning for seizure like activity as detailed below in HPI.  Both on yesterday's and today neuro exam, and through near continue observation by the critical care staff for epileptic events.     6/9  Neurology asked to reevaluate the patient since he has had 3-4 episodes for brief.  Of time noted to have tonic shaking of the upper extremities with eye rolling, and for a few minutes subsequently appears altered.  Critical team concerned about seizure-like activity.  Patient was recently worked up with an EEG which was normal and patient was felt to have cardiogenic/hypoperfusion syncope in the setting of bradycardia and hypoxia.  Patient not felt to have the same during these recent episodes.  His neurological exam is limited but patient does obey commands, has diffuse weakness of all 4 extremities.  Concern for seizures with recurrent episodes possible further evaluation with video EEG versus empirically treating the patient with IV Keppra until medically stable.  Discussed with critical care team, agreed to start IV Keppra 1 g followed by 500 twice daily and if symptoms fail to resolve we will consider transfer to [place] for video EEG monitoring.  Currently video EEG not available at the [facility] since it is in use for another patient.

## 2024-06-09 NOTE — ASSESSMENT & PLAN NOTE
6/3/2024: New to this neuro team today seen in follow-up after initial neuro consult done yesterday is this 39 y.o. male with vent dependent trach and peg (9/2023), who has an undiagnosed neuromuscular syndrome, despite both inpatient and outpatient workups.  Question also whether or not he may have sustained an anoxic brain injury (not evident clinically nor on prior MRI).  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  He also has a history of , h/o PE on Eliquis, depression and anxiety.  He has been seen in our outpatient neurology clinics and also been seen by [facility] neurology.   This patient was recently referred to the ED by outpatient family medicine due to intermittent hypoxia to the 70s and high concern forpneumonia. Patient diagnosed with sepsis due to multifocal PNA and is currently being treated with ceftriaxone. WBC improving from 17-->7.9. Between 5/31 and 6/2, patient has had 4 events of bradycardia +/- hypoxia with the last two events concerning for seizure like activity as detailed below in HPI, see the note above.  Concerning the neuromuscular abnormalities today his neuro exam was consistent with that done previously and detailed below with patient alert and oriented, following multistep commands, proximal>distal weakness, asymmetric neuropathy.   After extensive record review of our colleagues notes from this admission as well as previous inpatient and outpatient visits and workup, see the HPI for details, his exam consistent with large fiber neuropathy, and possible myopathy vs deconditioning. Additional work-up warranted as patient is scheduled for outpatient neuromuscular evaluation.   In regards to recent events with concern for seizure like  activity, higher suspicion for hypoperfusion/cardiogenic syncope in the setting of bradycardia +/- hypoxia. No underlying pathology that is known at this time that would make the patient more predisposed to seizures; however reasonable to repeat MRI brain w wo as the last one was completed in 11/2023 at [facility] and was motion degraded.  Will also include IAC for reevaluation of prior bilateral IAC enhancement.    Plan:ENS2 sent to [employer] (which includes Ma2/Ta - ab most associated with testicular cancer; [place] panel did not include this)  Obtain repeat MRI brain IAC w wo      Patient is scheduled with outpatient Neuromuscular Dr. Dc on 7/17/24.

## 2024-06-09 NOTE — ASSESSMENT & PLAN NOTE
2x seizure activity, went bradycardic and hypoxic, unsure if seizure activity causes bradycardia/hypoxia or hypoxia/bradycardia causes seizure activity  05/31-06/01 overnight, pulseless asystole without hypoxia, s/p 2 x of epi, bicarb, and calcium, PEA on monitor, ROSC achieved with clearance of secretions, followed byseizure like activity, ativan 2mg given, seizure activity aborted  06/02-brooke to 35, hypoxic to 50%, suctioned, noted to have seizure-like activity, 2mg Ativan given   EEG resulted as normal, activity believed to be due to hypoxia  Initiated Keppra on 6/9  Plan:  Believed to not be seizure activity, manage secretions and try to keep oxygen sats above 90%.  Continue Keppra 750 mg BID  Plan for repeat EEG versus video EEG in the future, may need transfer

## 2024-06-09 NOTE — ASSESSMENT & PLAN NOTE
Hx of Testicular CA, S/P 3 cycles CDDP/ ,  then developed neuromuscular syndrome including urinary incontinence and resp failure resulting in vent-dependent, has not established with Neuro  History of anoxic brain injury and unknown neuromuscular disorder  S/p tracheostomy in 9/2023 at [facility], initially 8.0 cuffed,downsized to a Shiley 4 then capping trial.  Several days into the capping trial he developed aspiration pneumonia and ended up being hospitalized at [facility] with a upsized tracheostomy to 8.0, he had a bronchoscopy performed and required full ventilator support at that time.  Trach exchanged to 6.0 cuffed on 3/18/2024  Ventilator dependent 24/7  home vent settings: 18/500/40%/6  Current Vent settings: 18/500/40%/8

## 2024-06-10 ENCOUNTER — APPOINTMENT (INPATIENT)
Dept: CT IMAGING | Facility: HOSPITAL | Age: 39
DRG: 720 | End: 2024-06-10
Payer: COMMERCIAL

## 2024-06-10 ENCOUNTER — APPOINTMENT (INPATIENT)
Dept: NEUROLOGY | Facility: CLINIC | Age: 39
DRG: 720 | End: 2024-06-10
Payer: COMMERCIAL

## 2024-06-10 ENCOUNTER — HOSPITAL ENCOUNTER (INPATIENT)
Facility: HOSPITAL | Age: 39
LOS: 12 days | Discharge: HOME | DRG: 720 | End: 2024-06-22
Attending: INTERNAL MEDICINE | Admitting: INTERNAL MEDICINE
Payer: COMMERCIAL

## 2024-06-10 VITALS
HEIGHT: 76 IN | DIASTOLIC BLOOD PRESSURE: 71 MMHG | SYSTOLIC BLOOD PRESSURE: 106 MMHG | WEIGHT: 246.25 LBS | RESPIRATION RATE: 23 BRPM | OXYGEN SATURATION: 98 % | TEMPERATURE: 98.4 F | HEART RATE: 73 BPM | BODY MASS INDEX: 29.99 KG/M2

## 2024-06-10 DIAGNOSIS — J96.12 CHRONIC RESPIRATORY FAILURE WITH HYPOXIA AND HYPERCAPNIA (HCC): ICD-10-CM

## 2024-06-10 DIAGNOSIS — G70.9 NEUROMUSCULAR DISORDER (HCC): ICD-10-CM

## 2024-06-10 DIAGNOSIS — R56.9 SEIZURE-LIKE ACTIVITY (HCC): Primary | ICD-10-CM

## 2024-06-10 DIAGNOSIS — Z99.11 VENTILATOR DEPENDENT (HCC): Chronic | ICD-10-CM

## 2024-06-10 DIAGNOSIS — J96.11 CHRONIC RESPIRATORY FAILURE WITH HYPOXIA AND HYPERCAPNIA (HCC): ICD-10-CM

## 2024-06-10 DIAGNOSIS — E29.1 HYPOGONADISM IN MALE: ICD-10-CM

## 2024-06-10 DIAGNOSIS — J95.851 VENTILATOR ASSOCIATED PNEUMONIA (HCC): ICD-10-CM

## 2024-06-10 DIAGNOSIS — Z93.1 S/P PERCUTANEOUS ENDOSCOPIC GASTROSTOMY (PEG) TUBE PLACEMENT (HCC): Chronic | ICD-10-CM

## 2024-06-10 LAB
ALBUMIN SERPL BCP-MCNC: 3.9 G/DL (ref 3.5–5)
ALP SERPL-CCNC: 115 U/L (ref 34–104)
ALT SERPL W P-5'-P-CCNC: 41 U/L (ref 7–52)
AMMONIA PLAS-SCNC: 19 UMOL/L (ref 18–72)
ANION GAP SERPL CALCULATED.3IONS-SCNC: 8 MMOL/L (ref 4–13)
ARTERIAL PATENCY WRIST A: YES
AST SERPL W P-5'-P-CCNC: 20 U/L (ref 13–39)
ATRIAL RATE: 60 BPM
BASE EXCESS BLDA CALC-SCNC: 8.7 MMOL/L
BILIRUB SERPL-MCNC: 0.57 MG/DL (ref 0.2–1)
BUN SERPL-MCNC: 19 MG/DL (ref 5–25)
CA-I BLD-SCNC: 1.09 MMOL/L (ref 1.12–1.32)
CALCIUM SERPL-MCNC: 9.3 MG/DL (ref 8.4–10.2)
CHLORIDE SERPL-SCNC: 94 MMOL/L (ref 96–108)
CO2 SERPL-SCNC: 34 MMOL/L (ref 21–32)
CREAT SERPL-MCNC: 0.43 MG/DL (ref 0.6–1.3)
ERYTHROCYTE [DISTWIDTH] IN BLOOD BY AUTOMATED COUNT: 15.3 % (ref 11.6–15.1)
GFR SERPL CREATININE-BSD FRML MDRD: 147 ML/MIN/1.73SQ M
GLUCOSE SERPL-MCNC: 98 MG/DL (ref 65–140)
HCO3 BLDA-SCNC: 34.8 MMOL/L (ref 22–28)
HCT VFR BLD AUTO: 39 % (ref 36.5–49.3)
HGB BLD-MCNC: 12.2 G/DL (ref 12–17)
HOROWITZ INDEX BLDA+IHG-RTO: 40 MM[HG]
I-TIME: 1
LACTATE SERPL-SCNC: 1.1 MMOL/L (ref 0.5–2)
MAGNESIUM SERPL-MCNC: 2.5 MG/DL (ref 1.9–2.7)
MCH RBC QN AUTO: 29.1 PG (ref 26.8–34.3)
MCHC RBC AUTO-ENTMCNC: 31.3 G/DL (ref 31.4–37.4)
MCV RBC AUTO: 93 FL (ref 82–98)
O2 CT BLDA-SCNC: 18.5 ML/DL (ref 16–23)
OXYHGB MFR BLDA: 96.8 % (ref 94–97)
P AXIS: 56 DEGREES
PCO2 BLDA: 54.2 MM HG (ref 36–44)
PEEP RESPIRATORY: 6 CM[H2O]
PH BLDA: 7.43 [PH] (ref 7.35–7.45)
PHOSPHATE SERPL-MCNC: 3.5 MG/DL (ref 2.7–4.5)
PLATELET # BLD AUTO: 228 THOUSANDS/UL (ref 149–390)
PMV BLD AUTO: 9.7 FL (ref 8.9–12.7)
PO2 BLDA: 102.5 MM HG (ref 75–129)
POTASSIUM SERPL-SCNC: 3.9 MMOL/L (ref 3.5–5.3)
PR INTERVAL: 166 MS
PROT SERPL-MCNC: 7.7 G/DL (ref 6.4–8.4)
QRS AXIS: 87 DEGREES
QRSD INTERVAL: 96 MS
QT INTERVAL: 416 MS
QTC INTERVAL: 416 MS
RBC # BLD AUTO: 4.19 MILLION/UL (ref 3.88–5.62)
SODIUM SERPL-SCNC: 136 MMOL/L (ref 135–147)
SPECIMEN SOURCE: ABNORMAL
T WAVE AXIS: 74 DEGREES
VENT AC: 12
VENT- AC: AC
VENTRICULAR RATE: 60 BPM
VT SETTING VENT: 500 ML
WBC # BLD AUTO: 11.47 THOUSAND/UL (ref 4.31–10.16)

## 2024-06-10 PROCEDURE — 82805 BLOOD GASES W/O2 SATURATION: CPT

## 2024-06-10 PROCEDURE — 83605 ASSAY OF LACTIC ACID: CPT

## 2024-06-10 PROCEDURE — 99232 SBSQ HOSP IP/OBS MODERATE 35: CPT | Performed by: PSYCHIATRY & NEUROLOGY

## 2024-06-10 PROCEDURE — 83735 ASSAY OF MAGNESIUM: CPT | Performed by: EMERGENCY MEDICINE

## 2024-06-10 PROCEDURE — 82330 ASSAY OF CALCIUM: CPT | Performed by: EMERGENCY MEDICINE

## 2024-06-10 PROCEDURE — 95700 EEG CONT REC W/VID EEG TECH: CPT

## 2024-06-10 PROCEDURE — 93005 ELECTROCARDIOGRAM TRACING: CPT

## 2024-06-10 PROCEDURE — 95715 VEEG EA 12-26HR INTMT MNTR: CPT

## 2024-06-10 PROCEDURE — 94760 N-INVAS EAR/PLS OXIMETRY 1: CPT

## 2024-06-10 PROCEDURE — 94003 VENT MGMT INPAT SUBQ DAY: CPT

## 2024-06-10 PROCEDURE — 5A1955Z RESPIRATORY VENTILATION, GREATER THAN 96 CONSECUTIVE HOURS: ICD-10-PCS | Performed by: ANESTHESIOLOGY

## 2024-06-10 PROCEDURE — 94640 AIRWAY INHALATION TREATMENT: CPT

## 2024-06-10 PROCEDURE — NC001 PR NO CHARGE: Performed by: INTERNAL MEDICINE

## 2024-06-10 PROCEDURE — 99291 CRITICAL CARE FIRST HOUR: CPT | Performed by: INTERNAL MEDICINE

## 2024-06-10 PROCEDURE — 94669 MECHANICAL CHEST WALL OSCILL: CPT

## 2024-06-10 PROCEDURE — 85027 COMPLETE CBC AUTOMATED: CPT | Performed by: EMERGENCY MEDICINE

## 2024-06-10 PROCEDURE — 82140 ASSAY OF AMMONIA: CPT

## 2024-06-10 PROCEDURE — 84100 ASSAY OF PHOSPHORUS: CPT | Performed by: EMERGENCY MEDICINE

## 2024-06-10 PROCEDURE — 70450 CT HEAD/BRAIN W/O DYE: CPT

## 2024-06-10 PROCEDURE — 94002 VENT MGMT INPAT INIT DAY: CPT

## 2024-06-10 PROCEDURE — 94150 VITAL CAPACITY TEST: CPT

## 2024-06-10 PROCEDURE — 93010 ELECTROCARDIOGRAM REPORT: CPT | Performed by: INTERNAL MEDICINE

## 2024-06-10 PROCEDURE — 80053 COMPREHEN METABOLIC PANEL: CPT | Performed by: EMERGENCY MEDICINE

## 2024-06-10 RX ORDER — SCOLOPAMINE TRANSDERMAL SYSTEM 1 MG/1
1 PATCH, EXTENDED RELEASE TRANSDERMAL
Status: DISCONTINUED | OUTPATIENT
Start: 2024-06-12 | End: 2024-06-18

## 2024-06-10 RX ORDER — ACETAMINOPHEN 160 MG/5ML
650 SUSPENSION ORAL EVERY 8 HOURS PRN
Status: DISCONTINUED | OUTPATIENT
Start: 2024-06-10 | End: 2024-06-19

## 2024-06-10 RX ORDER — SODIUM CHLORIDE FOR INHALATION 3 %
4 VIAL, NEBULIZER (ML) INHALATION
Status: DISCONTINUED | OUTPATIENT
Start: 2024-06-10 | End: 2024-06-10 | Stop reason: HOSPADM

## 2024-06-10 RX ORDER — FLUOXETINE 10 MG/1
10 CAPSULE ORAL DAILY
Status: DISCONTINUED | OUTPATIENT
Start: 2024-06-11 | End: 2024-06-20

## 2024-06-10 RX ORDER — LEVETIRACETAM 500 MG/5ML
750 INJECTION, SOLUTION, CONCENTRATE INTRAVENOUS EVERY 12 HOURS SCHEDULED
Status: CANCELLED | OUTPATIENT
Start: 2024-06-10

## 2024-06-10 RX ORDER — LEVALBUTEROL INHALATION SOLUTION 1.25 MG/3ML
1.25 SOLUTION RESPIRATORY (INHALATION) EVERY 6 HOURS
Status: DISCONTINUED | OUTPATIENT
Start: 2024-06-10 | End: 2024-06-22 | Stop reason: HOSPADM

## 2024-06-10 RX ORDER — SODIUM CHLORIDE FOR INHALATION 3 %
4 VIAL, NEBULIZER (ML) INHALATION
Status: DISCONTINUED | OUTPATIENT
Start: 2024-06-10 | End: 2024-06-10

## 2024-06-10 RX ORDER — LEVETIRACETAM 500 MG/5ML
750 INJECTION, SOLUTION, CONCENTRATE INTRAVENOUS EVERY 12 HOURS SCHEDULED
Status: DISCONTINUED | OUTPATIENT
Start: 2024-06-10 | End: 2024-06-11

## 2024-06-10 RX ORDER — GUAIFENESIN 100 MG/5ML
200 SOLUTION ORAL EVERY 6 HOURS
Status: DISCONTINUED | OUTPATIENT
Start: 2024-06-10 | End: 2024-06-22 | Stop reason: HOSPADM

## 2024-06-10 RX ORDER — SENNOSIDES 8.6 MG
1 TABLET ORAL
Status: CANCELLED | OUTPATIENT
Start: 2024-06-10

## 2024-06-10 RX ORDER — ATROPINE SULFATE 1 MG/ML
0.5 INJECTION, SOLUTION INTRAVENOUS ONCE AS NEEDED
Status: CANCELLED | OUTPATIENT
Start: 2024-06-10

## 2024-06-10 RX ORDER — LEVALBUTEROL INHALATION SOLUTION 1.25 MG/3ML
1.25 SOLUTION RESPIRATORY (INHALATION) EVERY 6 HOURS
Status: CANCELLED | OUTPATIENT
Start: 2024-06-10

## 2024-06-10 RX ORDER — ATROPINE SULFATE 1 MG/ML
0.5 INJECTION, SOLUTION INTRAVENOUS ONCE AS NEEDED
Status: COMPLETED | OUTPATIENT
Start: 2024-06-10 | End: 2024-06-10

## 2024-06-10 RX ORDER — CHLORHEXIDINE GLUCONATE ORAL RINSE 1.2 MG/ML
15 SOLUTION DENTAL EVERY 12 HOURS SCHEDULED
Status: CANCELLED | OUTPATIENT
Start: 2024-06-10

## 2024-06-10 RX ORDER — LORAZEPAM 2 MG/ML
INJECTION INTRAMUSCULAR
Status: COMPLETED
Start: 2024-06-10 | End: 2024-06-10

## 2024-06-10 RX ORDER — EPINEPHRINE 0.1 MG/ML
INJECTION INTRAVENOUS
Status: DISCONTINUED
Start: 2024-06-10 | End: 2024-06-10 | Stop reason: HOSPADM

## 2024-06-10 RX ORDER — LEVETIRACETAM 500 MG/5ML
750 INJECTION, SOLUTION, CONCENTRATE INTRAVENOUS EVERY 12 HOURS SCHEDULED
Status: DISCONTINUED | OUTPATIENT
Start: 2024-06-10 | End: 2024-06-10 | Stop reason: HOSPADM

## 2024-06-10 RX ORDER — FLUOXETINE 10 MG/1
10 CAPSULE ORAL DAILY
Status: CANCELLED | OUTPATIENT
Start: 2024-06-11

## 2024-06-10 RX ORDER — ATROPINE SULFATE 0.1 MG/ML
INJECTION INTRAVENOUS
Status: DISPENSED
Start: 2024-06-10 | End: 2024-06-11

## 2024-06-10 RX ORDER — SENNOSIDES 8.6 MG
1 TABLET ORAL
Status: DISCONTINUED | OUTPATIENT
Start: 2024-06-10 | End: 2024-06-10 | Stop reason: HOSPADM

## 2024-06-10 RX ORDER — CHLORHEXIDINE GLUCONATE ORAL RINSE 1.2 MG/ML
15 SOLUTION DENTAL EVERY 12 HOURS SCHEDULED
Status: DISCONTINUED | OUTPATIENT
Start: 2024-06-10 | End: 2024-06-22 | Stop reason: HOSPADM

## 2024-06-10 RX ORDER — LORAZEPAM 2 MG/ML
2 INJECTION INTRAMUSCULAR ONCE
Status: COMPLETED | OUTPATIENT
Start: 2024-06-10 | End: 2024-06-10

## 2024-06-10 RX ORDER — SODIUM CHLORIDE FOR INHALATION 3 %
4 VIAL, NEBULIZER (ML) INHALATION
Status: DISCONTINUED | OUTPATIENT
Start: 2024-06-10 | End: 2024-06-22 | Stop reason: HOSPADM

## 2024-06-10 RX ORDER — GUAIFENESIN 100 MG/5ML
200 SOLUTION ORAL EVERY 6 HOURS
Status: CANCELLED | OUTPATIENT
Start: 2024-06-10

## 2024-06-10 RX ORDER — SCOLOPAMINE TRANSDERMAL SYSTEM 1 MG/1
1 PATCH, EXTENDED RELEASE TRANSDERMAL
Status: CANCELLED | OUTPATIENT
Start: 2024-06-12

## 2024-06-10 RX ORDER — SENNOSIDES 8.6 MG
1 TABLET ORAL
Status: DISCONTINUED | OUTPATIENT
Start: 2024-06-10 | End: 2024-06-11

## 2024-06-10 RX ORDER — SODIUM CHLORIDE FOR INHALATION 3 %
4 VIAL, NEBULIZER (ML) INHALATION
Status: CANCELLED | OUTPATIENT
Start: 2024-06-10

## 2024-06-10 RX ORDER — ACETAMINOPHEN 160 MG/5ML
650 SUSPENSION ORAL EVERY 8 HOURS PRN
Status: CANCELLED | OUTPATIENT
Start: 2024-06-10

## 2024-06-10 RX ADMIN — SENNOSIDES 8.6 MG: 8.6 TABLET, FILM COATED ORAL at 21:02

## 2024-06-10 RX ADMIN — GUAIFENESIN 200 MG: 200 SOLUTION ORAL at 08:47

## 2024-06-10 RX ADMIN — SODIUM CHLORIDE SOLN NEBU 3% 4 ML: 3 NEBU SOLN at 14:56

## 2024-06-10 RX ADMIN — LORAZEPAM 2 MG: 2 INJECTION INTRAMUSCULAR at 04:49

## 2024-06-10 RX ADMIN — LORAZEPAM 2 MG: 2 INJECTION INTRAMUSCULAR; INTRAVENOUS at 04:49

## 2024-06-10 RX ADMIN — APIXABAN 5 MG: 5 TABLET, FILM COATED ORAL at 17:58

## 2024-06-10 RX ADMIN — LEVALBUTEROL HYDROCHLORIDE 1.25 MG: 1.25 SOLUTION RESPIRATORY (INHALATION) at 01:43

## 2024-06-10 RX ADMIN — SODIUM CHLORIDE SOLN NEBU 3% 4 ML: 3 NEBU SOLN at 20:15

## 2024-06-10 RX ADMIN — PIPERACILLIN SODIUM AND TAZOBACTAM SODIUM 4.5 G: 36; 4.5 INJECTION, POWDER, LYOPHILIZED, FOR SOLUTION INTRAVENOUS at 15:37

## 2024-06-10 RX ADMIN — APIXABAN 5 MG: 5 TABLET, FILM COATED ORAL at 08:10

## 2024-06-10 RX ADMIN — GUAIFENESIN 200 MG: 200 SOLUTION ORAL at 04:49

## 2024-06-10 RX ADMIN — GUAIFENESIN 200 MG: 100 SOLUTION ORAL at 20:57

## 2024-06-10 RX ADMIN — FLUOXETINE 10 MG: 10 CAPSULE ORAL at 08:10

## 2024-06-10 RX ADMIN — GUAIFENESIN 200 MG: 100 SOLUTION ORAL at 15:38

## 2024-06-10 RX ADMIN — LEVALBUTEROL HYDROCHLORIDE 1.25 MG: 1.25 SOLUTION RESPIRATORY (INHALATION) at 14:56

## 2024-06-10 RX ADMIN — CHLORHEXIDINE GLUCONATE 0.12% ORAL RINSE 15 ML: 1.2 LIQUID ORAL at 20:57

## 2024-06-10 RX ADMIN — ATROPINE SULFATE 0.5 MG: 1 INJECTION, SOLUTION INTRAVENOUS at 18:49

## 2024-06-10 RX ADMIN — CEFEPIME 2000 MG: 2 INJECTION, POWDER, FOR SOLUTION INTRAVENOUS at 04:49

## 2024-06-10 RX ADMIN — LEVALBUTEROL HYDROCHLORIDE 1.25 MG: 1.25 SOLUTION RESPIRATORY (INHALATION) at 07:28

## 2024-06-10 RX ADMIN — PIPERACILLIN SODIUM AND TAZOBACTAM SODIUM 4.5 G: 36; 4.5 INJECTION, POWDER, LYOPHILIZED, FOR SOLUTION INTRAVENOUS at 21:02

## 2024-06-10 RX ADMIN — PIPERACILLIN SODIUM AND TAZOBACTAM SODIUM 4.5 G: 36; 4.5 INJECTION, POWDER, LYOPHILIZED, FOR SOLUTION INTRAVENOUS at 09:55

## 2024-06-10 RX ADMIN — CHLORHEXIDINE GLUCONATE 15 ML: 1.2 RINSE ORAL at 08:10

## 2024-06-10 RX ADMIN — LEVETIRACETAM 750 MG: 100 INJECTION, SOLUTION INTRAVENOUS at 04:49

## 2024-06-10 RX ADMIN — LEVALBUTEROL HYDROCHLORIDE 1.25 MG: 1.25 SOLUTION RESPIRATORY (INHALATION) at 20:15

## 2024-06-10 RX ADMIN — LEVETIRACETAM 750 MG: 100 INJECTION, SOLUTION INTRAVENOUS at 20:57

## 2024-06-10 NOTE — DISCHARGE SUMMARY
[facility]  Discharge- Hemanth Swanson 1985, 39 y.o. male MRN: [id number]  Unit/Bed#: ICU 12 Encounter: [id number]  Primary Care Provider: Ela Douglas MD   Date and time admitted to hospital: 5/30/2024  4:55 PM    * Sepsis (HCC)  Assessment & Plan  Presented from PCP with hypoxia with desat in 70s and increased WOB, admitted for, Multilobar PNA on dependent vent   Sepsis Criteria meet: due to leukocytosis and tachypnea.  Suspect the source of pneumonia  CT PE chest: multilobar PNA, most severe in [place], aspiration not excluded, bilateral atelectasis, NO PE  Started on Zoysn abx and deescalated to Ceftriaxone on 5/31, switched to Cefepime on 6/2  Blood cultures and Strep/Legionella Urine cultures negative  Sputum cultures resulted with Serratia marcescens, and Pseudomonas aeruginosa.    Recent Labs     06/08/24  0514 06/09/24  0433   PROCALCITONI 0.11 0.11         Recent Labs     06/08/24  0514 06/09/24  0433 06/10/24  0454   WBC 13.41* 10.87* 11.47*       Switching from Cefepime to Zosyn to continue 14 day course  Continue Xopenex  Continue Mucinex   Hypertonic Saline Nebs    Transferring to [place] for further management and evaluation.  Established need for continuous video EEG monitoring which is currently unavailable at [place].      Bradycardia  Assessment & Plan  Concern for increased vagal stimulation resulting hypoxic bradycardia 2/2 to increased secretions and position of trach with mucus plugging  Patient has had multiple episodes of bradycardia into 20-30s due to increased trach secretions with mucus plugging, worsening depended on trach position, thought to stimulate vagal bradycardia. Of note on 5/31, 1x episode resulted in  pulseless asystole without hypoxia, s/p 2 x of epi, bicarb, and calcium, PEA on monitor, ROSC achieved with clearance of secretions.   S/p tracheostomy in 9/2023 at [facility], initially 8.0 cuffed,downsized to a Shiley 4 then capping  trial.  Several days into the capping trial he developed aspiration pneumonia and ended up being hospitalized at [facility] with a upsized tracheostomy to 8.0, he had a bronchoscopy performed and required full ventilator support at that time.  Trach exchanged to 6.0 cuffed on 3/18/2024  Home Vent A/ VC withTV 550, RR 24, MV 12-14 L, 100% spontaneous triggers  S/p Bronch on 06/01, exchanged Trach for 6XL during bronch, no further bradycardic events occurred throughout the day  06/02-brooke to 35, hypoxic to 50%, suctioned, noted to have seizure-like activity, 2mg Ativan given   Appreciate neurology consults, shaking believed to be 2/2 hypoxia, no findings on EEG indicating seizure activity.  Bronchoscopy done 6/4, and 6/5 without significant benefit to secretions    Plan:  Continue managing secretions.  Attempting scopolamine patch, with improvement of oropharyngeal secretions    Seizure-like activity (HCC)  Assessment & Plan  2x seizure activity, went bradycardic and hypoxic, unsure if seizure activity causes bradycardia/hypoxia or hypoxia/bradycardia causes seizure activity  05/31-06/01 overnight, pulseless asystole without hypoxia, s/p 2 x of epi, bicarb, and calcium, PEA on monitor, ROSC achieved with clearance of secretions, followed byseizure like activity, ativan 2mg given, seizure activity aborted  06/02-brooke to 35, hypoxic to 50%, suctioned, noted to have seizure-like activity, 2mg Ativan given   EEG resulted as normal, activity believed to be due to hypoxia  Initiated Keppra on 6/9  Plan:  Believed to not be seizure activity, manage secretions and try to keep oxygen sats above 90%.  Continue Keppra 750 mg BID  Plan for repeat EEG versus video EEG in the future, may need transfer    Ventilator dependent (HCC)  Assessment & Plan  Hx of Testicular CA, S/P 3 cycles CDDP/ ,  then developed neuromuscular syndrome including urinary incontinence and resp failure resulting in vent-dependent, has not established  with Neuro  History of anoxic brain injury and unknown neuromuscular disorder  S/p tracheostomy in 9/2023 at [facility], initially 8.0 cuffed,downsized to a Shiley 4 then capping trial.  Several days into the capping trial he developed aspiration pneumonia and ended up being hospitalized at [facility] with a upsized tracheostomy to 8.0, he had a bronchoscopy performed and required full ventilator support at that time.  Trach exchanged to 6.0 cuffed on 3/18/2024  Ventilator dependent 24/7  home vent settings: 18/500/40%/6  Current Vent settings: 12/500/40%/6      Neuromuscular disorder (HCC)  Assessment & Plan  Hx of Testicular CA, S/P 3 cycles CDDP/ ,  then developed neuromuscular syndrome including urinary incontinence and resp failure resulting in vent-dependent, has not established with Neuro  Neurology outpatient on 7/17    Status post tracheostomy (HCC)  Assessment & Plan  S/p tracheostomy in 9/2023 at [facility], initially 8.0 cuffed,downsized to a Shiley 4 then capping trial.  Several days into the capping trial he developed aspiration pneumonia and ended up being hospitalized at [facility] with a upsized tracheostomy to 8.0, he had a bronchoscopy performed and required full ventilator support at that time.  Trach exchanged to 6.0 cuffed on 3/18/2024  Home Vent A/ VC withTV 550, RR 24, MV 12-14 L, 100% spontaneous triggers  Concern for increased vagal stimulation resulting hypoxic bradycardia 2/2 to increased secretions and position of trach with mucus plugging  Current Trach is a  6XL   Several bronchs were performed between 6/1-6/5 with no significant relief of secretions     Plan:  Continue cefepime as above  Continuing scopolamine  Continue lasix    Impaired physical mobility  Assessment & Plan  -PT/OT for have signed off for now, possibly due to hypoxic episodes (reasoning unclear from notes)    S/P percutaneous endoscopic gastrostomy (PEG) tube placement (HCC)  Assessment &  Plan  -Monitor for s/s infection      Depression  Assessment & Plan  Continue fluoxetine        Medical Problems       Resolved Problems  Date Reviewed: 6/6/2024   None       Discharging Resident: Momo Bourne MD  Discharging Attending: No att. providers found  PCP: Ela Douglas MD  Admission Date:   Admission Orders (From admission, onward)       Ordered        05/30/24 2239  INPATIENT ADMISSION  Once                          Discharge Date: 06/10/24    Consultations During Hospital Stay:  Case management  Nutritional services  Neurology    Procedures Performed:   High-frequency chest wall oscillation  Mechanical ventilation  Bronchoscopy (multiple)  EEG    Significant Findings / Test Results:   ENS to [facility] miscellaneous test-negative results 6/2/2024  Bronchial culture growing Serratia and Pseudomonas  CT head wo contrast   Final Result by Brendon Snyder MD (06/10 0622)      No acute intracranial abnormality.                  Workstation performed: [id number]         XR chest portable ICU   Final Result by Jewel Burton MD (06/08 0748)   Bibasilar atelectasis. Left-sided pleural effusion.            Workstation performed: [id number]         XR chest portable ICU   Final Result by Sebastian Mayorga MD (06/04 1522)   Stable tubes and lines without pneumothorax or other complication.   Mild central congestion and left basilar pleural effusion as before.            Workstation performed: [id number]         XR chest portable ICU   Final Result by Rivera Matt MD (06/03 1312)      Stable left basilar patchy opacity. Effusion not excluded. Improved right-sided airspace disease            Workstation performed: [id number]         XR chest portable ICU   Final Result by Joan Mcintyre MD (06/01 0517)      Worsening bilateral consolidation which may be a combination of edema and pneumonia.      Persistent bibasilar atelectasis.            Workstation performed: [id number]         PE Study with  CT abdomen & pelvis with contrast   Final Result by Andrea Cook MD (05/30 2102)      No pulmonary embolism.      Dense consolidation in the lower lobes reidentified may represent chronic atelectasis.      New groundglass mixed with alveolar opacities in the remainder of the lung fields most severe in the left upper lobe consistent with multi lobar pneumonia, aspiration not excluded.      Interval development of mild multilevel superior plate compression deformities at T11-L2 with sclerosis suggesting chronic deformities. Correlate for focal back tenderness.            Workstation performed: [id number]         XR chest 1 view portable   ED Interpretation by Nakia Menard MD (05/30 1830)   Poor inspiratory effort, possible LLL opacification concerning for PNA. Will obtain CT chest      Final Result by Estefania Ngo MD (05/31 1412)   Low lung volumes with bilateral lower lobe opacities reflecting atelectasis versus infiltrates, progressed.            Workstation performed: [id number]              Incidental Findings:   None    Test Results Pending at Discharge (will require follow up):   Levetiracetam level     Outpatient Tests Requested:  N/A-being transferred    Complications: None    Reason for Admission: Hypoxic events    Hospital Course:   Hemanth Swanson is a 39 y.o. male patient who originally presented to the hospital on 5/30/2024 due to hypoxic events.  Patient has a history of chronic ventilator dependency secondary to anoxic brain injury 9/2023 which caused the patient to need a trach and PEG at that point.  Since being active and mobile band but within the past few weeks had development of a fine neuromuscular disorder.  Also noted to have multilobar pneumonia, later identified to be combination of Pseudomonas and Serratia bronchial cultures.  Patient was treated appropriately with cefepime after cultures resulted, and switch to Zosyn due to possibility of having seizures and cefepime reducing  seizure threshold.  Throughout his stay in the ICU, patient had recurrent episodes of hypoxia and bradycardia, first on 5/31 resulting in PEA arrest, ROSC achieved after 10 minutes.  Following commands afterwards but had suspected seizure-like activity and was given 2 mg of Ativan with resolution.  EEG was done but no episode was caught during that time, episodes were then felt to be hypoxic in nature secondary to episodes of perceived mucous plugging.  Continued to optimize patient with diuresis of excess fluid, controlling of secretions through pulmonary toileting and scopolamine patch, and treatment of pneumonia.  Multiple bronchoscopic procedures without significant findings.  Despite interventions patient continued to have episodes of desaturations and bradycardia, requiring bagging of the patient with initial attempts running into more resistance and then opening up as time went on.  During most of these events would be conscious with some tremulousness but able to answer and follow simple commands shortly after the episodes.  Within the past few days prior to transfer, events have had more episodes of incontinence associated with them, on morning of 6/10/2024 had 1 episode where patient was nonresponsive afterwards.  Had stat CT head and patient woke without any noticed new deficits, read of CT was negative.  At this time it is believed that these episodes could have underlying seizure-like activity, patient currently on 750 twice daily of Keppra.  Will also need extended antibiotic therapy (on Zosyn) for eradication of Pseudomonas secondary to being on chronic vent.  Answered to [place] for continuous video EEG monitoring and further evaluation/interventions.      Please see above list of diagnoses and related plan for additional information.     Condition at Discharge: stable    Discharge Day Visit / Exam:   * Please refer to separate progress note for these details *    Discussion with Family: Updated   (sister) via phone.    Discharge instructions/Information to patient and family:   See after visit summary for information provided to patient and family.      Provisions for Follow-Up Care:  See after visit summary for information related to follow-up care and any pertinent home health orders.      Mobility at time of Discharge:   Basic Mobility Inpatient Raw Score: 6  JH-HLM Goal: 2: Bed activities/Dependent transfer  JH-HLM Achieved: 2: Bed activities/Dependent transfer  HLM Goal achieved. Continue to encourage appropriate mobility.     Disposition:   Acute Care Hospital Transfer to [facility]    Planned Readmission: Yes    Discharge Medications:  See after visit summary for reconciled discharge medications provided to patient and/or family.      **Please Note: This note may have been constructed using a voice recognition system**       72

## 2024-06-10 NOTE — NURSING NOTE
Patient had multiple episodes of bradycardia/ hypoxia into the 60's over night. Patient became bradycardic and hypoxic around 4 am and required suction and bagging. Patient had seizure like episode, unable to control bowel or bladder, foaming at the mouth, and convulsions. [facility] team and RT at bedside.

## 2024-06-10 NOTE — PLAN OF CARE
Problem: Prexisting or High Potential for Compromised Skin Integrity  Goal: Skin integrity is maintained or improved  Description: INTERVENTIONS:  - Identify patients at risk for skin breakdown  - Assess and monitor skin integrity  - Assess and monitor nutrition and hydration status  - Monitor labs   - Assess for incontinence   - Turn and reposition patient  - Assist with mobility/ambulation  - Relieve pressure over bony prominences  - Avoid friction and shearing  - Provide appropriate hygiene as needed including keeping skin clean and dry  - Evaluate need for skin moisturizer/barrier cream  - Collaborate with interdisciplinary team   - Patient/family teaching  - Consider wound care consult   6/10/2024 1410 by Janny Ortega RN  Outcome: Progressing  6/10/2024 1410 by Janny Ortega RN  Outcome: Progressing     Problem: PAIN - ADULT  Goal: Verbalizes/displays adequate comfort level or baseline comfort level  Description: Interventions:  - Encourage patient to monitor pain and request assistance  - Assess pain using appropriate pain scale  - Administer analgesics based on type and severity of pain and evaluate response  - Implement non-pharmacological measures as appropriate and evaluate response  - Consider cultural and social influences on pain and pain management  - Notify physician/advanced practitioner if interventions unsuccessful or patient reports new pain  Outcome: Progressing     Problem: INFECTION - ADULT  Goal: Absence or prevention of progression during hospitalization  Description: INTERVENTIONS:  - Assess and monitor for signs and symptoms of infection  - Monitor lab/diagnostic results  - Monitor all insertion sites, i.e. indwelling lines, tubes, and drains  - Monitor endotracheal if appropriate and nasal secretions for changes in amount and color  - [place] appropriate cooling/warming therapies per order  - Administer medications as ordered  - Instruct and encourage patient and family to use  good hand hygiene technique  - Identify and instruct in appropriate isolation precautions for identified infection/condition  Outcome: Progressing  Goal: Absence of fever/infection during neutropenic period  Description: INTERVENTIONS:  - Monitor WBC    Outcome: Progressing     Problem: SAFETY ADULT  Goal: Patient will remain free of falls  Description: INTERVENTIONS:  - Educate patient/family on patient safety including physical limitations  - Instruct patient to call for assistance with activity   - Consult OT/PT to assist with strengthening/mobility   - Keep Call bell within reach  - Keep bed low and locked with side rails adjusted as appropriate  - Keep care items and personal belongings within reach  - Initiate and maintain comfort rounds  - Make Fall Risk Sign visible to staff  - Offer Toileting every  Hours, in advance of need  - Initiate/Maintain alarm  - Obtain necessary fall risk management equipment:   - Apply yellow socks and bracelet for high fall risk patients  - Consider moving patient to room near nurses station  Outcome: Progressing     Problem: SAFETY ADULT  Goal: Patient will remain free of falls  Description: INTERVENTIONS:  - Educate patient/family on patient safety including physical limitations  - Instruct patient to call for assistance with activity   - Consult OT/PT to assist with strengthening/mobility   - Keep Call bell within reach  - Keep bed low and locked with side rails adjusted as appropriate  - Keep care items and personal belongings within reach  - Initiate and maintain comfort rounds  - Make Fall Risk Sign visible to staff  - Offer Toileting every  Hours, in advance of need  - Initiate/Maintain alarm  - Obtain necessary fall risk management equipment:  - Apply yellow socks and bracelet for high fall risk patients  - Consider moving patient to room near nurses station  Outcome: Progressing  Goal: Maintain or return to baseline ADL function  Description: INTERVENTIONS:  -  Assess  patient's ability to carry out ADLs; assess patient's baseline for ADL function and identify physical deficits which impact ability to perform ADLs (bathing, care of mouth/teeth, toileting, grooming, dressing, etc.)  - Assess/evaluate cause of self-care deficits   - Assess range of motion  - Assess patient's mobility; develop plan if impaired  - Assess patient's need for assistive devices and provide as appropriate  - Encourage maximum independence but intervene and supervise when necessary  - Involve family in performance of ADLs  - Assess for home care needs following discharge   - Consider OT consult to assist with ADL evaluation and planning for discharge  - Provide patient education as appropriate  Outcome: Progressing  Goal: Maintains/Returns to pre admission functional level  Description: INTERVENTIONS:  - Perform AM-PAC 6 Click Basic Mobility/ Daily Activity assessment daily.  - Set and communicate daily mobility goal to care team and patient/family/caregiver.   - Collaborate with rehabilitation services on mobility goals if consulted  - Perform Range of Motion  times a day.  - Reposition patient every  hours.  - Dangle patient  times a day  - Stand patient  times a day  - Ambulate patient times a day  - Out of bed to chair  times a day   - Out of bed for meals  times a day  - Out of bed for toileting  - Record patient progress and toleration of activity level   Outcome: Progressing     Problem: DISCHARGE PLANNING  Goal: Discharge to home or other facility with appropriate resources  Description: INTERVENTIONS:  - Identify barriers to discharge w/patient and caregiver  - Arrange for needed discharge resources and transportation as appropriate  - Identify discharge learning needs (meds, wound care, etc.)  - Arrange for interpretive services to assist at discharge as needed  - Refer to Case Management Department for coordinating discharge planning if the patient needs post-hospital services based on  physician/advanced practitioner order or complex needs related to functional status, cognitive ability, or social support system  Outcome: Progressing     Problem: Knowledge Deficit  Goal: Patient/family/caregiver demonstrates understanding of disease process, treatment plan, medications, and discharge instructions  Description: Complete learning assessment and assess knowledge base.  Interventions:  - Provide teaching at level of understanding  - Provide teaching via preferred learning methods  Outcome: Progressing

## 2024-06-10 NOTE — ASSESSMENT & PLAN NOTE
38yo male with vent dependent trach with PE on eliquis was admitted on 5/30/2024 for pneumonia with bradycardia + hypoxia. Neurology was consulted for seizure like activity associated with brooke+hypoxia. rEEG on 06/02/2024 was unremarkable. It was suspected of reactive seizure in the setting of pneumonia rather than primary epilepsy.   Patient also has extensive neurologic workup including paraneoplastic autoimmune and myasthenia gravis testing as well as EMG with no clear pathology noted for vertical gaze palsy, proximal lower extremity greater than upper extremity weakness. Negative ENS2 on 6/2/2024    On 6/9: neuro is asked to re-eval due to 3-4 episode of tonic shaking of the UE with eye rolling and altered for a few mins after. S/p IV keppra 1g followed by Keppra 500mg BID then increased to 750mg BID. Overnight of 6/9, per ICU  nursing staff, patient had multiple episodes of brooke/hypoxia. This morning 4am, he had an episode with bowel/bladder incontinence, foaming at the mouth and convulsions. Ativan 2mg PRN was given.    Plan:  -Discussed plan with neurology attending, Dr. Guan. Please refer to the attestation for additional recommendation and plan  -Continue Keppra 750mg BID  -As patient is in the process of transferring to [facility], recommend continuous vEEG to capture and characterize the events at [facility]  -Recommend MRI brain seizure protocol  -Consider switching from Cefepime to a different antibiotic, as Cefepime can lower seizure threshold  -Seizure precautions  -Ativan 2mg PRN for seizures lasting more than 3 mins and notify on call neurology  -Rest of medical management per Primary team    Patient is scheduled with outpatient Neuromuscular Dr. Dc on 7/17/24.

## 2024-06-10 NOTE — ASSESSMENT & PLAN NOTE
Hx of Testicular CA, S/P 3 cycles CDDP/ ,  then developed neuromuscular syndrome including urinary incontinence and resp failure resulting in vent-dependent, has not established with Neuro  History of anoxic brain injury and unknown neuromuscular disorder  S/p tracheostomy in 9/2023 at [facility], initially 8.0 cuffed,downsized to a [place]iley 4 then capping trial.  Several days into the capping trial he developed aspiration pneumonia and ended up being hospitalized at [facility] with a upsized tracheostomy to 8.0, he had a bronchoscopy performed and required full ventilator support at that time.  Trach exchanged to 6.0 cuffed on 3/18/2024  Ventilator dependent 24/7  home vent settings: 18/500/40%/6  Current Vent settings: 12/500/40%/6

## 2024-06-10 NOTE — QUICK NOTE
Inpatient consult to Neurology  Consult performed by: Patricia Hackett,   Consult ordered by: Shara Samano MD      See by Neurology team Dr. Victorino Stack and Dr. Cheng Guan on 6/10. Please see their note for A/P. Reach out to neurology team at any time with any further questions or concerns.

## 2024-06-10 NOTE — QUICK NOTE
Patient had another episode of hypoxia, with high Peak and low TV around 5:45pm. He quickly became bradycardic in 20s. 1 amp of atropine was given and patient was immediately detached from ventilator with manual bagging. Suction was done via tracheactomy tube showing some white sputum. No obstruction noted when advancing suction tube.     His HR soon returned to 90s. We placed him back to ventilator and patient can generate TV of 400.     Plan to keep him NPO for now.

## 2024-06-10 NOTE — PLAN OF CARE
Problem: Potential for Falls  Goal: Patient will remain free of falls  Description: INTERVENTIONS:  - Educate patient/family on patient safety including physical limitations  - Instruct patient to call for assistance with activity   - Consult OT/PT to assist with strengthening/mobility   - Keep Call bell within reach  - Keep bed low and locked with side rails adjusted as appropriate  - Keep care items and personal belongings within reach  - Initiate and maintain comfort rounds  - Apply yellow socks and bracelet for high fall risk patients  - Consider moving patient to room near nurses station  Outcome: Progressing     Problem: Prexisting or High Potential for Compromised Skin Integrity  Goal: Skin integrity is maintained or improved  Description: INTERVENTIONS:  - Identify patients at risk for skin breakdown  - Assess and monitor skin integrity  - Assess and monitor nutrition and hydration status  - Monitor labs   - Assess for incontinence   - Turn and reposition patient  - Assist with mobility/ambulation  - Relieve pressure over bony prominences  - Avoid friction and shearing  - Provide appropriate hygiene as needed including keeping skin clean and dry  - Evaluate need for skin moisturizer/barrier cream  - Collaborate with interdisciplinary team   - Patient/family teaching  - Consider wound care consult   Outcome: Progressing     Problem: Nutrition/Hydration-ADULT  Goal: Nutrient/Hydration intake appropriate for improving, restoring or maintaining nutritional needs  Description: Monitor and assess patient's nutrition/hydration status for malnutrition. Collaborate with interdisciplinary team and initiate plan and interventions as ordered.  Monitor patient's weight and dietary intake as ordered or per policy. Utilize nutrition screening tool and intervene as necessary. Determine patient's food preferences and provide high-protein, high-caloric foods as appropriate.     INTERVENTIONS:  - Monitor oral intake, urinary  output, labs, and treatment plans  - Assess nutrition and hydration status and recommend course of action  - Evaluate amount of meals eaten  - Assist patient with eating if necessary   - Allow adequate time for meals  - Recommend/ encourage appropriate diets, oral nutritional supplements, and vitamin/mineral supplements  - Order, calculate, and assess calorie counts as needed  - Recommend, monitor, and adjust tube feedings and TPN/PPN based on assessed needs  - Assess need for intravenous fluids  - Provide specific nutrition/hydration education as appropriate  - Include patient/family/caregiver in decisions related to nutrition  Outcome: Progressing     Problem: PAIN - ADULT  Goal: Verbalizes/displays adequate comfort level or baseline comfort level  Description: Interventions:  - Encourage patient to monitor pain and request assistance  - Assess pain using appropriate pain scale  - Administer analgesics based on type and severity of pain and evaluate response  - Implement non-pharmacological measures as appropriate and evaluate response  - Consider cultural and social influences on pain and pain management  - Notify physician/advanced practitioner if interventions unsuccessful or patient reports new pain  Outcome: Progressing     Problem: INFECTION - ADULT  Goal: Absence or prevention of progression during hospitalization  Description: INTERVENTIONS:  - Assess and monitor for signs and symptoms of infection  - Monitor lab/diagnostic results  - Monitor all insertion sites, i.e. indwelling lines, tubes, and drains  - Monitor endotracheal if appropriate and nasal secretions for changes in amount and color  - [place] appropriate cooling/warming therapies per order  - Administer medications as ordered  - Instruct and encourage patient and family to use good hand hygiene technique  - Identify and instruct in appropriate isolation precautions for identified infection/condition  Outcome: Progressing  Goal: Absence of  fever/infection during neutropenic period  Description: INTERVENTIONS:  - Monitor WBC    Outcome: Progressing     Problem: DISCHARGE PLANNING  Goal: Discharge to home or other facility with appropriate resources  Description: INTERVENTIONS:  - Identify barriers to discharge w/patient and caregiver  - Arrange for needed discharge resources and transportation as appropriate  - Identify discharge learning needs (meds, wound care, etc.)  - Arrange for interpretive services to assist at discharge as needed  - Refer to Case Management Department for coordinating discharge planning if the patient needs post-hospital services based on physician/advanced practitioner order or complex needs related to functional status, cognitive ability, or social support system  Outcome: Progressing     Problem: Knowledge Deficit  Goal: Patient/family/caregiver demonstrates understanding of disease process, treatment plan, medications, and discharge instructions  Description: Complete learning assessment and assess knowledge base.  Interventions:  - Provide teaching at level of understanding  - Provide teaching via preferred learning methods  Outcome: Progressing

## 2024-06-10 NOTE — H&P
H&P Exam - Critical Care   Hemanth Swanson 39 y.o. 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  Patient originally presented on 5/31 for decreased oxygen saturation, shortness of breath while at his PCP.  CT positive for multilobar pneumonia. Bedside bronch w/ trach change completed 6/1, cultures grew serretia and pseudomonas. Patient originally on ceftriaxone, broadened to cefepime and then transitioned to zosyn. On 5/31 patient telemetry showed asystole and code blue was called. After 2 rounds of ACLS ROSC was obtained, patient had episode of left gaze and tonic clonic movements following ROSC which resolved with ativan. Throughout hospital course patient had several other episodes of bradycardia with hypoxia and seizure like activity that resolved with ativan. Patient was seen by neurology at [place] and thought to have reactive seizure like activity in response to bradycardia and hypoxia rather than primary epilepsy. EEG completed at [place] normal. Patient was recommended to have MRI brain and 24 hour video EEG.     [facility] patient was bradycardic to the 40s and saturating 60%. Patient was bagged. Patient not verbally responding.   History obtained from chart  -------------------------------------------------------------------------------------------------------------  Assessment and Plan:  Seizure-like activity  Ventilator associated pneumonia  Neuromuscular  disorder  Testicular seminoma, s/p ochiectomy/chemo  Trach and peg dependence  CROW      Neuro:   Diagnosis: Seizure-like activity  Continuous EEG  Brain MRI (EEG not MRI compatible)  Continue Keppra 750 mg BID  Ativan PRN seizure-like activity > 5 min   Lactic and ammonia obtained on arrival wnl  Diagnosis: Neuromuscular disorder  No oral diet  Continue tube feeds  Diagnosis: Altered mental status   In the setting of infection with seizure-like activity   Plan: delirium precautions  Monitor oxygen saturation  Maintain sleep wake cycle  Lactic and ammonia obtained on arrival wnl    CV:   Diagnosis: Bradycardia  Plan: Patient continues with episodes of bradycardia  Cardiac monitoring  Atropine prn  Lactic and ammonia obtained on arrival wnl  Diagnosis: HFpEF  Plan:   Echo in 8/23 showing EF 60%  Daily weights  Strict Is and Os      Pulm:  Diagnosis: Ventilator associated multifocal pneumonia  Plan: continue Trilogy   Xopenex  Hypertonic saline  Chest PT  Intermittent episodes of hypoxia  ABG on admission showing elevated Pco2 at 54.2, normal PO2 at 102.5, bicarb elevated at 34.8  Diagnosis: History pulmonary embolism  Hospitalized for right lower lobe segmental PE in August 2023, on eliquis since discharge  Plan:   Consider discontinuing eliquis and instead resuming standard heparin for DVT ppx    GI:   No acute issues, continue tube feeds    :   No acute issues  Sheppard in place for 9 days, continue      F/E/N:   F: none  E: monitor and replete as needed  N: tube feeds: Jevity 1.5 continuous 70 cc/hr w/ 200 cc free water flushes q4h      Heme/Onc:   Leukocytosis in the setting of multifocal pneumonia  Continue to monitor WBC count on antibiotics  Hgb stable  Platelets stable      Endo:   No acute problems  Will check TSH and am cortisol      ID:   Diagnosis: Ventilator associated multifocal pneumonia  Plan: Pseudomonas and serretia grown from bronchial washings  On ceftriaxone then transitioned to cefepime when  cultures showed pseudomonas   Cefepime discontinued in setting of seizure like activity   Zosyn for full 14 day course (through 6/12)    MSK/Skin:   Frequent repositioning      Disposition: Admit to Critical Care   Code Status: Level 1 - Full Code  --------------------------------------------------------------------------------------------------------------  Review of Systems    Review of systems was unable to be performed secondary to Patient unresponsive    Physical Exam  Constitutional:       General: He is sleeping.      Appearance: He is overweight.      Interventions: He is intubated.      Comments: EEG scalp leads in place   HENT:      Mouth/Throat:      Mouth: Mucous membranes are moist.   Eyes:      Comments: Pupils sluggish bilaterally   Neck:      Comments: Ventillated via trach SMV FIO2 40, PEEP 6, Vt 500  Cardiovascular:      Rate and Rhythm: Normal rate and regular rhythm.      Pulses: Normal pulses.      Heart sounds: Normal heart sounds. No murmur heard.     No friction rub. No gallop.   Pulmonary:      Effort: Pulmonary effort is normal. He is intubated.      Breath sounds: Normal breath sounds. No wheezing, rhonchi or rales.   Abdominal:      Palpations: Abdomen is soft.      Tenderness: There is no abdominal tenderness.      Hernia: A hernia (reducible umbilical hernia) is present.   Musculoskeletal:         General: No swelling. Normal range of motion.   Skin:     General: Skin is warm and dry.   Neurological:      Mental Status: He is easily aroused.      Comments: Does not verbally respond but nods and shakes head in response to questions         --------------------------------------------------------------------------------------------------------------  Vitals:   There were no vitals filed for this visit.  Temp  Min: 96.9 °F (36.1 °C)  Max: 100.6 °F (38.1 °C)        There is no height or weight on file to calculate BMI.  N/A    Laboratory and Diagnostics:  Results from last 7 days   Lab  Units 06/10/24  0454 06/09/24  0433 06/08/24  0514 06/07/24 0453 06/06/24 0439 06/05/24 0425 06/04/24  0442   WBC Thousand/uL 11.47* 10.87* 13.41* 11.54* 10.26* 8.70 8.18   HEMOGLOBIN g/dL 12.2 12.9 13.6 13.2 12.4 11.6* 12.0   HEMATOCRIT % 39.0 40.3 42.0 40.7 38.4 36.2* 36.9   PLATELETS Thousands/uL 228 251 259 244 227 195 221   SEGS PCT %  --  75  --  80* 76* 73 73   MONO PCT %  --  9  --  7 8 8 9   EOS PCT %  --  1  --  1 1 1 1     Results from last 7 days   Lab Units 06/10/24  0454 06/09/24  0433 06/08/24  0514 06/07/24  0453 06/06/24  0439 06/05/24  0425 06/04/24  0442   SODIUM mmol/L 136 137 136 135 136 136 136   POTASSIUM mmol/L 3.9 3.5 3.7 3.7 3.9 3.9 4.0   CHLORIDE mmol/L 94* 94* 94* 95* 99 102 102   CO2 mmol/L 34* 35* 33* 33* 31 27 27   ANION GAP mmol/L 8 8 9 7 6 7 7   BUN mg/dL 19 24 20 16 13 11 11   CREATININE mg/dL 0.43* 0.58* 0.56* 0.60 0.58* 0.49* 0.57*   CALCIUM mg/dL 9.3 9.5 9.7 9.6 9.4 8.8 9.1   GLUCOSE RANDOM mg/dL 98 110 101 128 101 104 105   ALT U/L 41  --  47  --   --   --   --    AST U/L 20  --  29  --   --   --   --    ALK PHOS U/L 115*  --  118*  --   --   --   --    ALBUMIN g/dL 3.9  --  4.2  --   --   --   --    TOTAL BILIRUBIN mg/dL 0.57  --  0.59  --   --   --   --      Results from last 7 days   Lab Units 06/10/24  0454 06/08/24  0514 06/06/24  0439   MAGNESIUM mg/dL 2.5 2.4 2.2   PHOSPHORUS mg/dL 3.5 3.6 3.5                   ABG:  Results from last 7 days   Lab Units 06/09/24  1318   PH ART  7.429   PCO2 ART mm Hg 46.1*   PO2 ART mm Hg 105.3   HCO3 ART mmol/L 29.8*   BASE EXC ART mmol/L 4.7   ABG SOURCE  Radial, Right     VBG:  Results from last 7 days   Lab Units 06/09/24  1318 06/09/24  0612 06/08/24  1354   PH SYLVESTER   --   --  7.420*   PCO2 SYLVESTER mm Hg  --   --  50.0   PO2 SYLVESTER mm Hg  --   --  33.1*   HCO3 SYLVESTER mmol/L  --   --  31.7*   BASE EXC SYLVESTER mmol/L  --   --  5.9   ABG SOURCE  Radial, Right   < >  --     < > = values in this interval not displayed.     Results from last 7 days  "  Lab Units 24  0433 24  0514   PROCALCITONIN ng/ml 0.11 0.11       Micro:        EKG: no new  Imaging: I have personally reviewed pertinent reports.      Historical Information   Past Medical History:   Diagnosis Date    Anxiety     Cancer (HCC)     Depression     Migration of percutaneous endoscopic gastrostomy (PEG) tube (HCC) 2023    Psychiatric disorder     bipolar     Past Surgical History:   Procedure Laterality Date    IR BIOPSY LYMPH NODE  2023    IR GASTROSTOMY TUBE PLACEMENT  2023    IR LUMBAR PUNCTURE  2023    IR PORT PLACEMENT  2023    ORCHIECTOMY Left 2022    Procedure: ORCHIECTOMY INGUINAL;  Surgeon: Flip Michael MD;  Location:  MAIN OR;  Service: Urology    PEG W/TRACHEOSTOMY PLACEMENT N/A 2023    Procedure: TRACHEOSTOMY WITH INSERTION PEG TUBE;  Surgeon: Rudy Mcmanus MD;  Location: [facility];  Service: General    TESTICLE SURGERY       Social History   Social History     Substance and Sexual Activity   Alcohol Use Not Currently    Comment: Former alcoholic. Last use in 2016     Social History     Substance and Sexual Activity   Drug Use Not Currently    Types: Marijuana, \"Crack\" cocaine    Comment: Current use medical marijuana. Not currently using crack cocaine.     Social History     Tobacco Use   Smoking Status Former    Current packs/day: 0.00    Average packs/day: 0.5 packs/day for 15.4 years (7.7 ttl pk-yrs)    Types: Cigarettes    Start date: 2008    Quit date: 2023    Years since quittin.0   Smokeless Tobacco Never     Family History:   Family History   Problem Relation Age of Onset    Coronary artery disease Maternal Aunt     Cancer Father     Diabetes Father     Hypertension Father      I have reviewed this patient's family history and commented on sigificant items within the HPI      Medications:  Current Facility-Administered Medications   Medication Dose Route Frequency    acetaminophen (TYLENOL) oral " suspension 650 mg  650 mg Oral Q8H PRN    apixaban (ELIQUIS) tablet 5 mg  5 mg Per PEG Tube BID    Atropine Sulfate injection 0.5 mg  0.5 mg Intravenous Once PRN    chlorhexidine (PERIDEX) 0.12 % oral rinse 15 mL  15 mL Mouth/Throat Q12H [facility]    [START ON 2024] FLUoxetine (PROzac) capsule 10 mg  10 mg Per G Tube Daily    guaiFENesin (ROBITUSSIN) oral liquid 200 mg  200 mg Oral Q6H    levalbuterol (XOPENEX) inhalation solution 1.25 mg  1.25 mg Nebulization Q6H    levETIRAcetam (KEPPRA) injection 750 mg  750 mg Intravenous Q12H ISAEL    piperacillin-tazobactam (ZOSYN) 4.5 g in sodium chloride 0.9 % 100 mL IVPB (EXTENDED INFUSION)  4.5 g Intravenous Q8H    [START ON 2024] scopolamine (TRANSDERM-SCOP) 1 mg/3 days TD 72 hr patch 1 patch  1 patch Transdermal Q72H    senna (SENOKOT) tablet 8.6 mg  1 tablet Oral HS    sodium chloride 3 % inhalation solution 4 mL  4 mL Nebulization Q6H     Home medications:  Prior to Admission Medications   Prescriptions Last Dose Informant Patient Reported? Taking?   FLUoxetine (PROzac) 10 mg capsule  Family Member No No   Si capsule (10 mg total) by Per G Tube route daily   Patient not taking: Reported on 2024   Incontinence Supply Disposable (Comfort Shield Adult Diapers) MISC  Family Member No No   Sig: Use 1 each 4 (four) times a day   Oral Hygiene Products (Toothette Swabs/Dentifrice) SWAB  Family Member No No   Sig: Apply to the mouth or throat 3 (three) times a day   Sennosides (senna) 8.8 mg/5 mL oral syrup  Family Member No No   Sig: 10 mL (17.6 mg total) by Per PEG Tube route daily as needed (Constipation)   Patient not taking: Reported on 2024   albuterol (2.5 mg/3 mL) 0.083 % nebulizer solution  Family Member Yes No   Sig: TAKE 3 ML (2.5 MG TOTAL) BY NEBULIZATION 4 (FOUR) TIMES A DAY.   apixaban (ELIQUIS) 5 mg  Family Member No No   Si tablet (5 mg total) by Per PEG Tube route 2 (two) times a day   Patient taking differently: 5 mg by Per PEG Tube route 2  (two) times a day Pt taking orally   hydrOXYzine (ATARAX) 10 mg/5 mL syrup  Family Member Yes No   Si-2 tsp QID prn anxiety PO   Patient not taking: Reported on 2024   oxygen gas  Family Member Yes No   Sig: Inhale 6 L/min as needed   polyethylene glycol (MIRALAX) 17 g packet  Family Member No No   Si g by Per PEG Tube route daily as needed (constipation)   Patient not taking: Reported on 2024   sodium chloride (BRONCHO SALINE) inhaler solution   Yes No   Sig: Take 1 spray by nebulization every 8 (eight) hours      Facility-Administered Medications: None     Allergies:  Allergies   Allergen Reactions    Dog Epithelium Cough, Sneezing and Nasal Congestion     ------------------------------------------------------------------------------------------------------------  Advance Directive and Living Will:      Power of :    POLST:    ------------------------------------------------------------------------------------------------------------  Anticipated Length of Stay is > 2 midnights    Care Time Delivered:   Upon my evaluation, this patient had a high probability of imminent or life-threatening deterioration due to acute hypoxic respiratory failure, which required my direct attention, intervention, and personal management.  I have personally provided 60 minutes (2pm to 3pm) of critical care time, exclusive of procedures, teaching, family meetings, and any prior time recorded by providers other than myself.         Shara Samano M.D.  [facility] PGY1  6/10/2024, 3:15 PM

## 2024-06-10 NOTE — ASSESSMENT & PLAN NOTE
S/p tracheostomy in 9/2023 at [facility], initially 8.0 cuffed,downsized to a Shiley 4 then capping trial.  Several days into the capping trial he developed aspiration pneumonia and ended up being hospitalized at [facility] with a upsized tracheostomy to 8.0, he had a bronchoscopy performed and required full ventilator support at that time.  Trach exchanged to 6.0 cuffed on 3/18/2024  Home Vent A/ VC withTV 550, RR 24, MV 12-14 L, 100% spontaneous triggers  Concern for increased vagal stimulation resulting hypoxic bradycardia 2/2 to increased secretions and position of trach with mucus plugging  Current Trach is a  6XL   Several bronchs were performed between 6/1-6/5 with no significant relief of secretions     Plan:  Continue cefepime as above  Continuing scopolamine  Continue lasix

## 2024-06-10 NOTE — ASSESSMENT & PLAN NOTE
Concern for increased vagal stimulation resulting hypoxic bradycardia 2/2 to increased secretions and position of trach with mucus plugging  Patient has had multiple episodes of bradycardia into 20-30s due to increased trach secretions with mucus plugging, worsening depended on trach position, thought to stimulate vagal bradycardia. Of note on 5/31, 1x episode resulted in  pulseless asystole without hypoxia, s/p 2 x of epi, bicarb, and calcium, PEA on monitor, ROSC achieved with clearance of secretions.   S/p tracheostomy in 9/2023 at [facility] initially 8.0 cuffed,downsized to a Shiley 4 then capping trial.  Several days into the capping trial he developed aspiration pneumonia and ended up being hospitalized at [facility] with a upsized tracheostomy to 8.0, he had a bronchoscopy performed and required full ventilator support at that time.  Trach exchanged to 6.0 cuffed on 3/18/2024  Home Vent A/ VC withTV 550, RR 24, MV 12-14 L, 100% spontaneous triggers  S/p Bronch on 06/01, exchanged Trach for 6XL during bronch, no further bradycardic events occurred throughout the day  06/02-brooke to 35, hypoxic to 50%, suctioned, noted to have seizure-like activity, 2mg Ativan given   Appreciate neurology consults, shaking believed to be 2/2 hypoxia, no findings on EEG indicating seizure activity.  Bronchoscopy done 6/4, and 6/5 without significant benefit to secretions    Plan:  Continue managing secretions.  Attempting scopolamine patch, with improvement of oropharyngeal secretions

## 2024-06-10 NOTE — ASSESSMENT & PLAN NOTE
Patient is with an undiagnosed neuromuscular syndrome, despite both inpatient and outpatient workups.  Question also whether or not he may have sustained an anoxic brain injury (not evident clinically nor on prior MRI).  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  He also has a history of , h/o PE on Eliquis, depression and anxiety. He has been seen in our outpatient neurology clinics and also been seen by [facility] neurology.      6/2/2024: Negative ENS2 panel    Patient is scheduled with outpatient Neuromuscular Dr. Dc on 7/17/24.

## 2024-06-10 NOTE — CASE MANAGEMENT
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     Psychophysiological insomnia 10/18/2023     Impaired physical mobility 2023     Status post tracheostomy ([facility]) 2023     S/P percutaneous endoscopic gastrostomy (PEG) tube placement ([facility]) 2023     Anxiety 2023     Chronic respiratory failure with hypoxia and hypercapnia ([facility]) 2023     Sepsis ([facility]) 2023     Acute pulmonary embolism without acute cor pulmonale ([facility]) 2023     Depression 2023     Neuromuscular weakness ([facility]) 2023     History of LVH (left ventricular hypertrophy) 2023     Pure hypertriglyceridemia 2023     Unilateral vestibular schwannoma ([facility]) 2023     Port-A-Cath in place 2023     Diplopia 2023     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[facility]) 2023     Umbilical hernia without obstruction and without gangrene 12/10/2022     Tobacco use 12/10/2022     Retroperitoneal lymphadenopathy 12/10/2022       LOS (days): 0  Geometric Mean LOS (GMLOS) (days):   Days to GMLOS:     OBJECTIVE:            Current admission status: Inpatient   Preferred Pharmacy:   [id number] [place] - [address] [address]  Phone: [phone] Fax: [phone]    Primary Care Provider: Ela Douglas MD    Primary Insurance: [facility]  Secondary Insurance:     DISCHARGE DETAILS:              Other Referral/Resources/Interventions  Provided:  Referral Comments: Per chart review: See open completed 5/31/2024.   Pt is vent dependent at home d/t neuromusculary syndrome.  Pt transfer from [facility] for vEEG - concern for seizure.  DME from [facility].  Tube feeds at home.  CM to follow for dcp.

## 2024-06-10 NOTE — ASSESSMENT & PLAN NOTE
38yo male with vent dependent trach (9/2023), PE on eliquis was admitted on 5/30/2024 for pneumonia with bradycardia + hypoxia. Neurology was consulted for seizure like activity associated with brooke+hypoxia. rEEG on 06/02/2024 was unremarkable. It was suspected of reactive seizure in the setting of pneumonia rather than primary epilepsy.   Patient also has extensive neurologic workup including paraneoplastic autoimmune and myasthenia gravis testing as well as EMG with no clear pathology noted for vertical gaze palsy, proximal lower extremity greater than upper extremity weakness.    On 6/9: neuro is asked to re-eval due to 3-4 episode of tonic shaking of the UE with eye rolling and altered for a few mins after. S/p IV keppra 1g followed by Keppra 500mg BID then increased to 750mg BID. Overnight of 6/9, per ICU  nursing staff, patient had multiple episodes of brooke/hypoxia. This morning 4am, he had an episode with bowel/bladder incontinence, foaming at the mouth and convulsions. Ativan 2mg PRN was given.    Plan:  -Discussed plan with neurology attending, Dr. Guan. Please refer to the attestation for additional recommendation and plan  -Continue Keppra 750mg BID  -As patient is in the process of transferring to [facility], recommend continuous vEEG to capture and characterize the events at [facility]  -Recommend MRI brain seizure protocol and IAC with and without (had prior bilateral IAC enhancement). Prior MRI done on 11/2023 at [facility] was reported motion degraded.  -Consider switching from Cefepime to a different antibiotic, as Cefepime can lower seizure threshold  -Seizure precautions  -Ativan 2mg PRN for seizures lasting more than 3 mins and notify on call neurology  -Rest of medical management per Primary team

## 2024-06-10 NOTE — ASSESSMENT & PLAN NOTE
Presented from PCP with hypoxia with desat in 70s and increased WOB, admitted for, Multilobar PNA on dependent vent   Sepsis Criteria meet: due to leukocytosis and tachypnea.  Suspect the source of pneumonia  CT PE chest: multilobar PNA, most severe in [place] MAN[place], aspiration not excluded, bilateral atelectasis, NO PE  Started on Zoysn abx and deescalated to Ceftriaxone on 5/31, switched to Cefepime on 6/2  Blood cultures and Strep/Legionella Urine cultures negative  Sputum cultures resulted with Serratia marcescens, and Pseudomonas aeruginosa.    Recent Labs     06/08/24  0514 06/09/24  0433   PROCALCITONI 0.11 0.11       Recent Labs     06/08/24  0514 06/09/24  0433 06/10/24  0454   WBC 13.41* 10.87* 11.47*       Cefepime for 7-day course, to end 6/11/2024  Continue Xopenex  Continue Mucinex

## 2024-06-10 NOTE — QUICK NOTE
"Spoke with patient's mother and provided patient updates. Per patient mother after discharge from a hospitalization patient was sent to a rehab facility and was found down, total time unknown could have been few hours. Since this episode, patient has never been the same. Needs help feeding and required PEG, needs ventilator support. Patient lives with his sister and aunt who care for him at home. Patient is able to communicate with family by white board and they are able to read his lips at home. Family plans to take patient back home when he is stable for discharge.  Spoke with patient sister who he lives with, provided updates. Per sister patient has been increasingly confused for the past year, sometimes pointing to his trach and asking \"what happened?\". Family history also significant for epilepsy, sister, brother and father all have seizures.      Shara Samano M.D.  [facility] Medicine PGY1  6/10/2024, 4:23 PM    "

## 2024-06-10 NOTE — ASSESSMENT & PLAN NOTE
Presented from PCP with hypoxia with desat in 70s and increased WOB, admitted for, Multilobar PNA on dependent vent   Sepsis Criteria meet: due to leukocytosis and tachypnea.  Suspect the source of pneumonia  CT PE chest: multilobar PNA, most severe in J[place] MANUEL, aspiration not excluded, bilateral atelectasis, NO PE  Started on Zoysn abx and deescalated to Ceftriaxone on 5/31, switched to Cefepime on 6/2  Blood cultures and Strep/Legionella Urine cultures negative  Sputum cultures resulted with Serratia marcescens, and Pseudomonas aeruginosa.    Recent Labs     06/08/24  0514 06/09/24  0433   PROCALCITONI 0.11 0.11         Recent Labs     06/08/24  0514 06/09/24  0433 06/10/24  0454   WBC 13.41* 10.87* 11.47*       Switching from Cefepime to Zosyn to continue 14 day course  Continue Xopenex  Continue Mucinex   Hypertonic Saline Nebs    Transferring to [facility] for further management and evaluation.  Established need for continuous video EEG monitoring which is currently unavailable at [facility].

## 2024-06-10 NOTE — ASSESSMENT & PLAN NOTE
Hx of Testicular CA, S/P 3 cycles CDDP/ ,  then developed neuromuscular syndrome including urinary incontinence and resp failure resulting in vent-dependent, has not established with Neuro  History of anoxic brain injury and unknown neuromuscular disorder  S/p tracheostomy in 9/2023 at [facility], initially 8.0 cuffed,downsized to a Shiley 4 then capping trial.  Several days into the capping trial he developed aspiration pneumonia and ended up being hospitalized at [facility] with a upsized tracheostomy to 8.0, he had a bronchoscopy performed and required full ventilator support at that time.  Trach exchanged to 6.0 cuffed on 3/18/2024  Ventilator dependent 24/7  home vent settings: 18/500/40%/6  Current Vent settings: 12/500/40%/6

## 2024-06-10 NOTE — ASSESSMENT & PLAN NOTE
S/p tracheostomy in 9/2023 at [facility], initially 8.0 cuffed,downsized to a Shiley 4 then capping trial.  Several days into the capping trial he developed aspiration pneumonia and ended up being hospitalized at [facility] with a upsized tracheostomy to 8.0, he had a bronchoscopy performed and required full ventilator support at that time.  Trach exchanged to 6.0 cuffed on 3/18/2024  Home Vent A/ VC withTV 550, RR 24, MV 12-14 L, 100% spontaneous triggers  Concern for increased vagal stimulation resulting hypoxic bradycardia 2/2 to increased secretions and position of trach with mucus plugging  Current Trach is a  6XL   Several bronchs were performed between 6/1-6/5 with no significant relief of secretions     Plan:  Continue cefepime as above  Continuing scopolamine  Continue lasix

## 2024-06-10 NOTE — PROGRESS NOTES
NEUROLOGY RESIDENCY PROGRESS NOTE     Name: Hemanth Swanson   Age & Sex: 39 y.o. male   MRN: [id number]  Unit/Bed#: ICU 12   Encounter: [id number]    Hemanth Swanson will need follow up with outpatient Neuromuscular Dr. Dc on 7/17/24.   He will not require outpatient neurological testing.     ASSESSMENT & PLAN     Seizure-like activity (HCC)  Assessment & Plan  40yo male with vent dependent trach (9/2023), PE on eliquis was admitted on 5/30/2024 for pneumonia with bradycardia + hypoxia. Neurology was consulted for seizure like activity associated with brooke+hypoxia. rEEG on 06/02/2024 was unremarkable. It was suspected of reactive seizure in the setting of pneumonia rather than primary epilepsy.   Patient also has extensive neurologic workup including paraneoplastic autoimmune and myasthenia gravis testing as well as EMG with no clear pathology noted for vertical gaze palsy, proximal lower extremity greater than upper extremity weakness.    On 6/9: neuro is asked to re-eval due to 3-4 episode of tonic shaking of the UE with eye rolling and altered for a few mins after. S/p IV keppra 1g followed by Keppra 500mg BID then increased to 750mg BID. Overnight of 6/9, per ICU  nursing staff, patient had multiple episodes of brooke/hypoxia. This morning 4am, he had an episode with bowel/bladder incontinence, foaming at the mouth and convulsions. Ativan 2mg PRN was given.    Plan:  -Discussed plan with neurology attending, Dr. Guan. Please refer to the attestation for additional recommendation and plan  -Continue Keppra 750mg BID  -As patient is in the process of transferring to [facility], recommend continuous vEEG to capture and characterize the events at [facility]  -Recommend MRI brain seizure protocol and IAC with and without (had prior bilateral IAC enhancement). Prior MRI done on 11/2023 at [facility] was reported motion degraded.  -Consider switching from Cefepime to a different antibiotic, as Cefepime can lower seizure  threshold  -Seizure precautions  -Ativan 2mg PRN for seizures lasting more than 3 mins and notify on call neurology  -Rest of medical management per Primary team    Neuromuscular disorder (HCC)  Assessment & Plan  Patient is with an undiagnosed neuromuscular syndrome, despite both inpatient and outpatient workups.  Question also whether or not he may have sustained an anoxic brain injury (not evident clinically nor on prior MRI).  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  He also has a history of , h/o PE on Eliquis, depression and anxiety. He has been seen in our outpatient neurology clinics and also been seen by [facility] neurology.      6/2/2024: Negative ENS2 panel    Patient is scheduled with outpatient Neuromuscular Dr. Dc on 7/17/24.        SUBJECTIVE     Patient was seen and examined.  Per ICU nursing staff, patient had multiple episodes of bradycardia/hypoxia overnight.  Around 4 AM, he had bradycardia and hypoxia episode and required suctioning and bagging.  He also had seizure-like episode associated with bowel/bladder incontinence and foaming at the mouth and convulsions. Ativan 2mg PRN was given.    This AM, patient is arousable by verbal stimulation.  He follows commands and answers questions by nodding and shaking his head appropriately.  He has no focal neurological deficits on exam.      Review of Systems   Constitutional:  Negative for chills and fever.   HENT:  Negative for ear pain and sore throat.    Eyes:  Negative for pain and visual disturbance.   Respiratory:  Negative for cough and shortness of breath.    Cardiovascular:  Negative for chest pain and palpitations.   Gastrointestinal:  Negative for abdominal pain and vomiting.    Genitourinary:  Negative for dysuria and hematuria.   Musculoskeletal:  Negative for arthralgias and back pain.   Skin:  Negative for color change and rash.   Neurological:  Positive for seizures. Negative for syncope, weakness and headaches.   All other systems reviewed and are negative.      OBJECTIVE     Patient ID: Hemanth Swanson is a 39 y.o. male.    Vitals:    06/10/24 1000 06/10/24 1100 06/10/24 1104 06/10/24 1200   BP: 100/64 112/71 112/71 106/71   BP Location:       Pulse: 68 75 71 73   Resp: 12 (!) 27 12 (!) 23   Temp:   98.4 °F (36.9 °C)    TempSrc:   Oral    SpO2: 100% 100% 100% 98%   Weight:       Height:          Temperature:   Temp (24hrs), Av.8 °F (37.1 °C), Min:98.2 °F (36.8 °C), Max:99.7 °F (37.6 °C)    Temperature: 98.4 °F (36.9 °C)      Physical Exam  Vitals and nursing note reviewed.   Constitutional:       Appearance: He is well-developed.   HENT:      Head: Normocephalic and atraumatic.      Nose: Nose normal.   Eyes:      Extraocular Movements: Extraocular movements intact and EOM normal.      Conjunctiva/sclera: Conjunctivae normal.      Pupils: Pupils are equal, round, and reactive to light.   Cardiovascular:      Rate and Rhythm: Normal rate.   Pulmonary:      Effort: Pulmonary effort is normal.      Comments: Trach in place  Abdominal:      Palpations: Abdomen is soft.      Tenderness: There is no abdominal tenderness.   Musculoskeletal:      Cervical back: Neck supple.   Skin:     General: Skin is warm.   Neurological:      Mental Status: He is alert.      Deep Tendon Reflexes:      Reflex Scores:       Patellar reflexes are 1+ on the right side and 1+ on the left side.       Achilles reflexes are 1+ on the right side and 1+ on the left side.         Neurologic Exam     Mental Status   Level of consciousness: arousable by verbal stimuli  Patient answers questions by nodding and shaking his head appropriately. Nonverbal with vent dependent trach.    He follows commands  appropriately     Cranial Nerves     CN II   Visual fields full to confrontation.     CN III, IV, VI   Pupils are equal, round, and reactive to light.  Extraocular motions are normal.     CN V   Facial sensation intact.     CN VII   Facial expression full, symmetric.     CN VIII   CN VIII normal.     CN IX, X   CN IX normal.   CN X normal.     CN XI   CN XI normal.     CN XII   CN XII normal.     Motor Exam   Muscle bulk: normal  Overall muscle tone: normal    Strength   Strength 5/5 except as noted. Patient cannot raise his legs up antigravity. B/l LE in external rotation. However without gravity, his proximal lower extremity strengths are 5/5 bilaterally     Sensory Exam   Light touch normal.     Gait, Coordination, and Reflexes     Reflexes   Reflexes 2+ except as noted.   Right patellar: 1+  Left patellar: 1+  Right achilles: 1+  Left achilles: 1+  Right plantar: normal  Left plantar: normal       LABORATORY DATA     Labs: I have personally reviewed pertinent reports.    Results from last 7 days   Lab Units 06/10/24  0454 06/09/24  0433 06/08/24  0514 06/07/24  0453 06/06/24  0439   WBC Thousand/uL 11.47* 10.87* 13.41* 11.54* 10.26*   HEMOGLOBIN g/dL 12.2 12.9 13.6 13.2 12.4   HEMATOCRIT % 39.0 40.3 42.0 40.7 38.4   PLATELETS Thousands/uL 228 251 259 244 227   SEGS PCT %  --  75  --  80* 76*   MONO PCT %  --  9  --  7 8   EOS PCT %  --  1  --  1 1      Results from last 7 days   Lab Units 06/10/24  0454 06/09/24  0433 06/08/24  0514   SODIUM mmol/L 136 137 136   POTASSIUM mmol/L 3.9 3.5 3.7   CHLORIDE mmol/L 94* 94* 94*   CO2 mmol/L 34* 35* 33*   BUN mg/dL 19 24 20   CREATININE mg/dL 0.43* 0.58* 0.56*   CALCIUM mg/dL 9.3 9.5 9.7   ALK PHOS U/L 115*  --  118*   ALT U/L 41  --  47   AST U/L 20  --  29     Results from last 7 days   Lab Units 06/10/24  0454 06/08/24  0514 06/06/24  0439   MAGNESIUM mg/dL 2.5 2.4 2.2     Results from last 7 days   Lab Units 06/10/24  0454 06/08/24  0514 06/06/24  0439   PHOSPHORUS  mg/dL 3.5 3.6 3.5                    IMAGING & DIAGNOSTIC TESTING     Radiology Results: I have personally reviewed pertinent reports.      CT head wo contrast   Final Result by Brendon Snyder MD (06/10 0622)      No acute intracranial abnormality.                  Workstation performed: [id number]         XR chest portable ICU   Final Result by Jewel Burton MD (06/08 0748)   Bibasilar atelectasis. Left-sided pleural effusion.            Workstation performed: [id number]         XR chest portable ICU   Final Result by Sebastian Mayorga MD (06/04 1522)   Stable tubes and lines without pneumothorax or other complication.   Mild central congestion and left basilar pleural effusion as before.            Workstation performed: [id number]         XR chest portable ICU   Final Result by Rivera Matt MD (06/03 1312)      Stable left basilar patchy opacity. Effusion not excluded. Improved right-sided airspace disease            Workstation performed: [id number]         XR chest portable ICU   Final Result by Joan Mcintyre MD (06/01 0517)      Worsening bilateral consolidation which may be a combination of edema and pneumonia.      Persistent bibasilar atelectasis.            Workstation performed: [id number]         PE Study with CT abdomen & pelvis with contrast   Final Result by Andrea Cook MD (05/30 2102)      No pulmonary embolism.      Dense consolidation in the lower lobes reidentified may represent chronic atelectasis.      New groundglass mixed with alveolar opacities in the remainder of the lung fields most severe in the left upper lobe consistent with multi lobar pneumonia, aspiration not excluded.      Interval development of mild multilevel superior plate compression deformities at T11-L2 with sclerosis suggesting chronic deformities. Correlate for focal back tenderness.            Workstation performed: [id number]         XR chest 1 view portable   ED Interpretation by Nakia Menard MD  (05/30 1830)   Poor inspiratory effort, possible LLL opacification concerning for PNA. Will obtain CT chest      Final Result by Estefania Ngo MD (05/31 9211)   Low lung volumes with bilateral lower lobe opacities reflecting atelectasis versus infiltrates, progressed.            Workstation performed: [id number]             Other Diagnostic Testing: I have personally reviewed pertinent reports.      ACTIVE MEDICATIONS     No current facility-administered medications for this encounter.     Facility-Administered Medications Ordered in Other Encounters   Medication Dose Route Frequency    acetaminophen (TYLENOL) oral suspension 650 mg  650 mg Oral Q8H PRN    apixaban (ELIQUIS) tablet 5 mg  5 mg Per PEG Tube BID    Atropine Sulfate injection 0.5 mg  0.5 mg Intravenous Once PRN    chlorhexidine (PERIDEX) 0.12 % oral rinse 15 mL  15 mL Mouth/Throat Q12H ISAEL    [START ON 6/11/2024] FLUoxetine (PROzac) capsule 10 mg  10 mg Per G Tube Daily    guaiFENesin (ROBITUSSIN) oral liquid 200 mg  200 mg Oral Q6H    levalbuterol (XOPENEX) inhalation solution 1.25 mg  1.25 mg Nebulization Q6H    levETIRAcetam (KEPPRA) injection 750 mg  750 mg Intravenous Q12H ISAEL    piperacillin-tazobactam (ZOSYN) 4.5 g in sodium chloride 0.9 % 100 mL IVPB (EXTENDED INFUSION)  4.5 g Intravenous Q8H    [START ON 6/12/2024] scopolamine (TRANSDERM-SCOP) 1 mg/3 days TD 72 hr patch 1 patch  1 patch Transdermal Q72H    senna (SENOKOT) tablet 8.6 mg  1 tablet Oral HS    sodium chloride 3 % inhalation solution 4 mL  4 mL Nebulization Q6H       Prior to Admission medications    Medication Sig Start Date End Date Taking? Authorizing Provider   albuterol (2.5 mg/3 mL) 0.083 % nebulizer solution TAKE 3 ML (2.5 MG TOTAL) BY NEBULIZATION 4 (FOUR) TIMES A DAY. 4/15/24  Yes Historical Provider, MD   apixaban (ELIQUIS) 5 mg 1 tablet (5 mg total) by Per PEG Tube route 2 (two) times a day  Patient taking differently: 5 mg by Per PEG Tube route 2 (two) times a day  Pt taking orally 10/18/23  Yes Dora Lee MD   Incontinence Supply Disposable (Comfort Shield Adult Diapers) MISC Use 1 each 4 (four) times a day 10/18/23  Yes Dora Lee MD   Oral Hygiene Products (Toothette Swabs/Dentifrice) SWAB Apply to the mouth or throat 3 (three) times a day 10/18/23  Yes Dora Lee MD   oxygen gas Inhale 6 L/min as needed   Yes Historical Provider, MD   sodium chloride (BRONCHO SALINE) inhaler solution Take 1 spray by nebulization every 8 (eight) hours   Yes Historical Provider, MD   FLUoxetine (PROzac) 10 mg capsule 1 capsule (10 mg total) by Per G Tube route daily  Patient not taking: Reported on 1/30/2024 10/18/23   Dora Lee MD   hydrOXYzine (ATARAX) 10 mg/5 mL syrup 1-2 tsp QID prn anxiety PO  Patient not taking: Reported on 5/30/2024 4/15/24   Historical Provider, MD   polyethylene glycol (MIRALAX) 17 g packet 17 g by Per PEG Tube route daily as needed (constipation)  Patient not taking: Reported on 5/30/2024 9/23/23   AMBER Leonard   Sennosides (senna) 8.8 mg/5 mL oral syrup 10 mL (17.6 mg total) by Per PEG Tube route daily as needed (Constipation)  Patient not taking: Reported on 5/30/2024 9/23/23   AMBER Leonard       VTE Pharmacologic Prophylaxis: Apixaban (Eliquis)  VTE Mechanical Prophylaxis: sequential compression device    ==    Victorino Stack MD  [facility] Neurology Residency, PGY-III

## 2024-06-10 NOTE — PROGRESS NOTES
[facility]  Progress Note  Name: Hemanth Swanson I  MRN: [id number]  Unit/Bed#: ICU 12 I Date of Admission: 5/30/2024   Date of Service: 6/10/2024 I Hospital Day: 11    Assessment & Plan   * Sepsis (HCC)  Assessment & Plan  Presented from PCP with hypoxia with desat in 70s and increased WOB, admitted for, Multilobar PNA on dependent vent   Sepsis Criteria meet: due to leukocytosis and tachypnea.  Suspect the source of pneumonia  CT PE chest: multilobar PNA, most severe in [place] MANU[place], aspiration not excluded, bilateral atelectasis, NO PE  Started on Zoysn abx and deescalated to Ceftriaxone on 5/31, switched to Cefepime on 6/2  Blood cultures and Strep/Legionella Urine cultures negative  Sputum cultures resulted with Serratia marcescens, and Pseudomonas aeruginosa.    Recent Labs     06/08/24  0514 06/09/24  0433   PROCALCITONI 0.11 0.11         Recent Labs     06/08/24  0514 06/09/24  0433 06/10/24  0454   WBC 13.41* 10.87* 11.47*       Switching from Cefepime to Zosyn to continue 14 day course  Continue Xopenex  Continue Mucinex   Hypertonic Saline Nebs      Bradycardia  Assessment & Plan  Concern for increased vagal stimulation resulting hypoxic bradycardia 2/2 to increased secretions and position of trach with mucus plugging  Patient has had multiple episodes of bradycardia into 20-30s due to increased trach secretions with mucus plugging, worsening depended on trach position, thought to stimulate vagal bradycardia. Of note on 5/31, 1x episode resulted in  pulseless asystole without hypoxia, s/p 2 x of epi, bicarb, and calcium, PEA on monitor, ROSC achieved with clearance of secretions.   S/p tracheostomy in 9/2023 at [facility], initially 8.0 cuffed,downsized to a Shiley 4 then capping trial.  Several days into the capping trial he developed aspiration pneumonia and ended up being hospitalized at [facility] with a upsized tracheostomy to 8.0, he had a bronchoscopy performed and  required full ventilator support at that time.  Trach exchanged to 6.0 cuffed on 3/18/2024  Home Vent A/ VC withTV 550, RR 24, MV 12-14 L, 100% spontaneous triggers  S/p Bronch on 06/01, exchanged Trach for 6XL during bronch, no further bradycardic events occurred throughout the day  06/02-brooke to 35, hypoxic to 50%, suctioned, noted to have seizure-like activity, 2mg Ativan given   Appreciate neurology consults, shaking believed to be 2/2 hypoxia, no findings on EEG indicating seizure activity.  Bronchoscopy done 6/4, and 6/5 without significant benefit to secretions    Plan:  Continue managing secretions.  Attempting scopolamine patch, with improvement of oropharyngeal secretions    Seizure-like activity (HCC)  Assessment & Plan  2x seizure activity, went bradycardic and hypoxic, unsure if seizure activity causes bradycardia/hypoxia or hypoxia/bradycardia causes seizure activity  05/31-06/01 overnight, pulseless asystole without hypoxia, s/p 2 x of epi, bicarb, and calcium, PEA on monitor, ROSC achieved with clearance of secretions, followed byseizure like activity, ativan 2mg given, seizure activity aborted  06/02-brooke to 35, hypoxic to 50%, suctioned, noted to have seizure-like activity, 2mg Ativan given   EEG resulted as normal, activity believed to be due to hypoxia  Initiated Keppra on 6/9  Plan:  Believed to not be seizure activity, manage secretions and try to keep oxygen sats above 90%.  Continue Keppra 750 mg BID  Plan for repeat EEG versus video EEG in the future, may need transfer    Ventilator dependent (HCC)  Assessment & Plan  Hx of Testicular CA, S/P 3 cycles CDDP/ ,  then developed neuromuscular syndrome including urinary incontinence and resp failure resulting in vent-dependent, has not established with Neuro  History of anoxic brain injury and unknown neuromuscular disorder  S/p tracheostomy in 9/2023 at [facility], initially 8.0 cuffed,downsized to a Shiley 4 then capping trial.   Several days into the capping trial he developed aspiration pneumonia and ended up being hospitalized at [facility] with a upsized tracheostomy to 8.0, he had a bronchoscopy performed and required full ventilator support at that time.  Trach exchanged to 6.0 cuffed on 3/18/2024  Ventilator dependent 24/7  home vent settings: 18/500/40%/6  Current Vent settings: 12/500/40%/6      Neuromuscular disorder (HCC)  Assessment & Plan  Hx of Testicular CA, S/P 3 cycles CDDP/ ,  then developed neuromuscular syndrome including urinary incontinence and resp failure resulting in vent-dependent, has not established with Neuro  Neurology outpatient on 7/17    Status post tracheostomy (HCC)  Assessment & Plan  S/p tracheostomy in 9/2023 at [facility], initially 8.0 cuffed,downsized to a Shiley 4 then capping trial.  Several days into the capping trial he developed aspiration pneumonia and ended up being hospitalized at [facility] with a upsized tracheostomy to 8.0, he had a bronchoscopy performed and required full ventilator support at that time.  Trach exchanged to 6.0 cuffed on 3/18/2024  Home Vent A/ VC withTV 550, RR 24, MV 12-14 L, 100% spontaneous triggers  Concern for increased vagal stimulation resulting hypoxic bradycardia 2/2 to increased secretions and position of trach with mucus plugging  Current Trach is a  6XL   Several bronchs were performed between 6/1-6/5 with no significant relief of secretions     Plan:  Continue cefepime as above  Continuing scopolamine  Continue lasix    Impaired physical mobility  Assessment & Plan  -PT/OT for have signed off for now, possibly due to hypoxic episodes (reasoning unclear from notes)    S/P percutaneous endoscopic gastrostomy (PEG) tube placement (HCC)  Assessment & Plan  -Monitor for s/s infection      Depression  Assessment & Plan  Continue fluoxetine             Disposition: Critical care    ICU Core Measures     Vented Patient  VAP Bundle  VAP bundle ordered      A: Assess, Prevent, and Manage Pain Has pain been assessed? Yes  Need for changes to pain regimen? No   B: Both Spontaneous Awakening Trials (SATs) and Spontaneous Breathing Trials (SBTs) Plan to perform spontaneous awakening trial today? Chronic vent   Plan to perform spontaneous breathing trial today? Chronic vent   Obvious barriers to extubation? Yes   C: Choice of Sedation RASS Goal: 0 Alert and Calm  Need for changes to sedation or analgesia regimen? No   D: Delirium CAM-ICU: Negative   E: Early Mobility  Plan for early mobility? Yes   F: Family Engagement Plan for family engagement today? Yes       Antibiotic Review: Patient on appropriate coverage based on culture data.     Review of Invasive Devices:      Central access plan:  Port in place      Prophylaxis:  VTE VTE covered by:  apixaban, Per PEG Tube, 5 mg at 06/10/24 0810       Stress Ulcer  not ordered         Significant 24hr Events     24hr events: Patient with multiple episodes last night, had 1 episode where he was incontinent of stool and urine.  1 following episode with change in mentation, stat CT, recovered after CT.  No acute findings on CT.  Conversation with neurology on 9, decided to increase Keppra from 500-7 50 twice daily.  Plan for EEG sometime in the future, unsure if will be able to transfer to [place] for continuous video EEG monitoring secondary to lack of bed availability.     Subjective   Review of Systems: See HPI for Review of Systems     Objective                            Vitals I/O      Most Recent Min/Max in 24hrs   Temp 98.6 °F (37 °C) Temp  Min: 98.2 °F (36.8 °C)  Max: 99.7 °F (37.6 °C)   Pulse 77 Pulse  Min: 49  Max: 88   Resp 12 Resp  Min: 12  Max: 46   /66 BP  Min: 101/66  Max: 140/83   O2 Sat 99 % SpO2  Min: 56 %  Max: 100 %      Intake/Output Summary (Last 24 hours) at 6/10/2024 0944  Last data filed at 6/10/2024 0800  Gross per 24 hour   Intake 2521 ml   Output 950 ml   Net 1571 ml       Diet  Enteral/Parenteral; Tube Feeding No Oral Diet; Jevity 1.5; Continuous; 70; 200; Water; Every 4 hours    Invasive Monitoring           Physical Exam   Physical Exam  Vitals and nursing note reviewed.   Skin:     General: Skin is warm and dry.   HENT:      Head: Normocephalic and atraumatic.      Right Ear: Hearing normal.      Left Ear: Hearing normal.      Mouth/Throat:      Mouth: Mucous membranes are moist.   Neck:      Trachea: Tracheostomy present.   Cardiovascular:      Rate and Rhythm: Normal rate and regular rhythm.      Heart sounds: Normal heart sounds.   Musculoskeletal:      Right lower leg: No edema.      Left lower leg: No edema.   Abdominal: General: There is abdominal type feeding tube.There is no distension.      Palpations: Abdomen is soft.      Tenderness: There is no abdominal tenderness.   Pulmonary:      Breath sounds: Rhonchi present.   Neurological:      Mental Status: He is alert. Mental status is at baseline. He is calm.   Genitourinary/Anorectal:  external catheter present.          Diagnostic Studies      EKG: No significant events on telemetry  Imaging: CT head taken for alteration of mentation, returned to baseline.  CT without significant findings.  I have personally reviewed pertinent reports.       Medications:  Scheduled PRN   apixaban, 5 mg, BID  chlorhexidine, 15 mL, Q12H ISAEL  EPINEPHrine, ,   FLUoxetine, 10 mg, Daily  guaiFENesin, 200 mg, Q6H  levalbuterol, 1.25 mg, Q6H  levETIRAcetam, 750 mg, Q12H ISAEL  piperacillin-tazobactam, 4.5 g, Once   Followed by  piperacillin-tazobactam, 4.5 g, Q8H  scopolamine, 1 patch, Q72H  senna, 1 tablet, HS  sodium chloride, 4 mL, Q6H      acetaminophen, 650 mg, Q8H PRN  atropine, 0.5 mg, Once PRN  EPINEPHrine, ,        Continuous          Labs:    CBC    Recent Labs     06/09/24  0433 06/10/24  0454   WBC 10.87* 11.47*   HGB 12.9 12.2   HCT 40.3 39.0    228     BMP    Recent Labs     06/09/24  0433 06/10/24  0454   SODIUM 137 136   K 3.5 3.9    CL 94* 94*   CO2 35* 34*   AGAP 8 8   BUN 24 19   CREATININE 0.58* 0.43*   CALCIUM 9.5 9.3       Coags    No recent results     Additional Electrolytes  Recent Labs     06/10/24  0454   MG 2.5   PHOS 3.5   CAIONIZED 1.09*          Blood Gas    Recent Labs     06/09/24  1318   PHART 7.429   UIF1MXY 46.1*   PO2ART 105.3   CNW5XTF 29.8*   BEART 4.7   SOURCE Radial, Right     Recent Labs     06/08/24  1354 06/09/24  0612 06/09/24  1318   PHVEN 7.420*  --   --    ZUE4LRU 50.0  --   --    PO2VEN 33.1*  --   --    PVO9MNK 31.7*  --   --    BEVEN 5.9  --   --    G1BISZE 62.3  --   --    SOURCE  --    < > Radial, Right    < > = values in this interval not displayed.    LFTs  Recent Labs     06/10/24  0454   ALT 41   AST 20   ALKPHOS 115*   ALB 3.9   TBILI 0.57       Infectious  Recent Labs     06/09/24  0433   PROCALCITONI 0.11     Glucose  Recent Labs     06/09/24  0433 06/10/24  0454   GLUC 110 98               Momo Bourne MD

## 2024-06-10 NOTE — ASSESSMENT & PLAN NOTE
Concern for increased vagal stimulation resulting hypoxic bradycardia 2/2 to increased secretions and position of trach with mucus plugging  Patient has had multiple episodes of bradycardia into 20-30s due to increased trach secretions with mucus plugging, worsening depended on trach position, thought to stimulate vagal bradycardia. Of note on 5/31, 1x episode resulted in  pulseless asystole without hypoxia, s/p 2 x of epi, bicarb, and calcium, PEA on monitor, ROSC achieved with clearance of secretions.   S/p tracheostomy in 9/2023 at [facility], initially 8.0 cuffed,downsized to a Shiley 4 then capping trial.  Several days into the capping trial he developed aspiration pneumonia and ended up being hospitalized at [facility] with a upsized tracheostomy to 8.0, he had a bronchoscopy performed and required full ventilator support at that time.  Trach exchanged to 6.0 cuffed on 3/18/2024  Home Vent A/ VC withTV 550, RR 24, MV 12-14 L, 100% spontaneous triggers  S/p Bronch on 06/01, exchanged Trach for 6XL during bronch, no further bradycardic events occurred throughout the day  06/02-brooke to 35, hypoxic to 50%, suctioned, noted to have seizure-like activity, 2mg Ativan given   Appreciate neurology consults, shaking believed to be 2/2 hypoxia, no findings on EEG indicating seizure activity.  Bronchoscopy done 6/4, and 6/5 without significant benefit to secretions    Plan:  Continue managing secretions.  Attempting scopolamine patch, with improvement of oropharyngeal secretions

## 2024-06-11 ENCOUNTER — APPOINTMENT (INPATIENT)
Dept: NON INVASIVE DIAGNOSTICS | Facility: HOSPITAL | Age: 39
DRG: 720 | End: 2024-06-11
Payer: COMMERCIAL

## 2024-06-11 ENCOUNTER — APPOINTMENT (INPATIENT)
Dept: GASTROENTEROLOGY | Facility: HOSPITAL | Age: 39
DRG: 720 | End: 2024-06-11
Payer: COMMERCIAL

## 2024-06-11 ENCOUNTER — APPOINTMENT (INPATIENT)
Dept: RADIOLOGY | Facility: HOSPITAL | Age: 39
DRG: 720 | End: 2024-06-11
Payer: COMMERCIAL

## 2024-06-11 ENCOUNTER — APPOINTMENT (INPATIENT)
Dept: NEUROLOGY | Facility: CLINIC | Age: 39
DRG: 720 | End: 2024-06-11
Payer: COMMERCIAL

## 2024-06-11 PROBLEM — I46.8 CARDIAC ARREST DUE TO OTHER UNDERLYING CONDITION (HCC): Status: ACTIVE | Noted: 2024-06-11

## 2024-06-11 PROBLEM — J95.851 VENTILATOR ASSOCIATED PNEUMONIA (HCC): Status: ACTIVE | Noted: 2024-06-11

## 2024-06-11 LAB
ALBUMIN SERPL BCG-MCNC: 4 G/DL (ref 3.5–5)
ALP SERPL-CCNC: 139 U/L (ref 34–104)
ALT SERPL W P-5'-P-CCNC: 39 U/L (ref 7–52)
ANION GAP SERPL CALCULATED.3IONS-SCNC: 7 MMOL/L (ref 4–13)
AORTIC ROOT: 3.4 CM
APICAL FOUR CHAMBER EJECTION FRACTION: 67 %
ASCENDING AORTA: 3.1 CM
AST SERPL W P-5'-P-CCNC: 18 U/L (ref 13–39)
ATRIAL RATE: 42 BPM
BILIRUB SERPL-MCNC: 0.77 MG/DL (ref 0.2–1)
BSA FOR ECHO PROCEDURE: 2.46 M2
BUN SERPL-MCNC: 18 MG/DL (ref 5–25)
CA-I BLD-SCNC: 1.19 MMOL/L (ref 1.12–1.32)
CALCIUM SERPL-MCNC: 9.6 MG/DL (ref 8.4–10.2)
CHLORIDE SERPL-SCNC: 93 MMOL/L (ref 96–108)
CO2 SERPL-SCNC: 36 MMOL/L (ref 21–32)
CORTIS AM PEAK SERPL-MCNC: 16.8 UG/DL (ref 6.7–22.6)
CREAT SERPL-MCNC: 0.54 MG/DL (ref 0.6–1.3)
E WAVE DECELERATION TIME: 198 MS
E/A RATIO: 0.92
ERYTHROCYTE [DISTWIDTH] IN BLOOD BY AUTOMATED COUNT: 15 % (ref 11.6–15.1)
FRACTIONAL SHORTENING: 37 (ref 28–44)
GFR SERPL CREATININE-BSD FRML MDRD: 134 ML/MIN/1.73SQ M
GLUCOSE SERPL-MCNC: 95 MG/DL (ref 65–140)
HCT VFR BLD AUTO: 39.4 % (ref 36.5–49.3)
HGB BLD-MCNC: 12.6 G/DL (ref 12–17)
INTERVENTRICULAR SEPTUM IN DIASTOLE (PARASTERNAL SHORT AXIS VIEW): 0.9 CM
INTERVENTRICULAR SEPTUM: 0.9 CM (ref 0.6–1.1)
LAAS-AP2: 18.1 CM2
LAAS-AP4: 16.8 CM2
LEFT ATRIUM SIZE: 3.8 CM
LEFT ATRIUM VOLUME (MOD BIPLANE): 46 ML
LEFT ATRIUM VOLUME INDEX (MOD BIPLANE): 18.7 ML/M2
LEFT INTERNAL DIMENSION IN SYSTOLE: 2.6 CM (ref 2.1–4)
LEFT VENTRICULAR INTERNAL DIMENSION IN DIASTOLE: 4.1 CM (ref 3.5–6)
LEFT VENTRICULAR POSTERIOR WALL IN END DIASTOLE: 1 CM
LEFT VENTRICULAR STROKE VOLUME: 50 ML
LEVETIRACETAM SERPL-MCNC: 13.3 UG/ML (ref 12–46)
LVSV (TEICH): 50 ML
MAGNESIUM SERPL-MCNC: 2.3 MG/DL (ref 1.9–2.7)
MCH RBC QN AUTO: 29.6 PG (ref 26.8–34.3)
MCHC RBC AUTO-ENTMCNC: 32 G/DL (ref 31.4–37.4)
MCV RBC AUTO: 93 FL (ref 82–98)
MV E'TISSUE VEL-LAT: 10 CM/S
MV E'TISSUE VEL-SEP: 8 CM/S
MV PEAK A VEL: 0.76 M/S
MV PEAK E VEL: 70 CM/S
MV STENOSIS PRESSURE HALF TIME: 58 MS
MV VALVE AREA P 1/2 METHOD: 3.79
P AXIS: 49 DEGREES
PHOSPHATE SERPL-MCNC: 4.2 MG/DL (ref 2.7–4.5)
PLATELET # BLD AUTO: 232 THOUSANDS/UL (ref 149–390)
PMV BLD AUTO: 10.1 FL (ref 8.9–12.7)
POTASSIUM SERPL-SCNC: 3.9 MMOL/L (ref 3.5–5.3)
PR INTERVAL: 156 MS
PROT SERPL-MCNC: 7.8 G/DL (ref 6.4–8.4)
QRS AXIS: 90 DEGREES
QRSD INTERVAL: 88 MS
QT INTERVAL: 448 MS
QTC INTERVAL: 374 MS
RA PRESSURE ESTIMATED: 10 MMHG
RBC # BLD AUTO: 4.26 MILLION/UL (ref 3.88–5.62)
RIGHT ATRIUM AREA SYSTOLE A4C: 19 CM2
RIGHT VENTRICLE ID DIMENSION: 5.5 CM
RV PSP: 59 MMHG
SL CV LEFT ATRIUM LENGTH A2C: 5.2 CM
SL CV LV EF: 65
SL CV PED ECHO LEFT VENTRICLE DIASTOLIC VOLUME (MOD BIPLANE) 2D: 73 ML
SL CV PED ECHO LEFT VENTRICLE SYSTOLIC VOLUME (MOD BIPLANE) 2D: 24 ML
SODIUM SERPL-SCNC: 136 MMOL/L (ref 135–147)
T WAVE AXIS: 78 DEGREES
TR MAX PG: 49 MMHG
TR PEAK VELOCITY: 3.5 M/S
TRICUSPID ANNULAR PLANE SYSTOLIC EXCURSION: 1.9 CM
TRICUSPID VALVE PEAK REGURGITATION VELOCITY: 3.5 M/S
TSH SERPL DL<=0.05 MIU/L-ACNC: 4.13 UIU/ML (ref 0.45–4.5)
VENTRICULAR RATE: 42 BPM
WBC # BLD AUTO: 10.77 THOUSAND/UL (ref 4.31–10.16)

## 2024-06-11 PROCEDURE — 85027 COMPLETE CBC AUTOMATED: CPT

## 2024-06-11 PROCEDURE — 84100 ASSAY OF PHOSPHORUS: CPT

## 2024-06-11 PROCEDURE — 84443 ASSAY THYROID STIM HORMONE: CPT

## 2024-06-11 PROCEDURE — 94003 VENT MGMT INPAT SUBQ DAY: CPT

## 2024-06-11 PROCEDURE — 80053 COMPREHEN METABOLIC PANEL: CPT

## 2024-06-11 PROCEDURE — 82330 ASSAY OF CALCIUM: CPT

## 2024-06-11 PROCEDURE — 93010 ELECTROCARDIOGRAM REPORT: CPT | Performed by: INTERNAL MEDICINE

## 2024-06-11 PROCEDURE — 83735 ASSAY OF MAGNESIUM: CPT

## 2024-06-11 PROCEDURE — 0BJ08ZZ INSPECTION OF TRACHEOBRONCHIAL TREE, VIA NATURAL OR ARTIFICIAL OPENING ENDOSCOPIC: ICD-10-PCS | Performed by: ANESTHESIOLOGY

## 2024-06-11 PROCEDURE — 82533 TOTAL CORTISOL: CPT

## 2024-06-11 PROCEDURE — 94664 DEMO&/EVAL PT USE INHALER: CPT

## 2024-06-11 PROCEDURE — 93306 TTE W/DOPPLER COMPLETE: CPT | Performed by: INTERNAL MEDICINE

## 2024-06-11 PROCEDURE — 94669 MECHANICAL CHEST WALL OSCILL: CPT

## 2024-06-11 PROCEDURE — 94760 N-INVAS EAR/PLS OXIMETRY 1: CPT

## 2024-06-11 PROCEDURE — 94640 AIRWAY INHALATION TREATMENT: CPT

## 2024-06-11 PROCEDURE — 71045 X-RAY EXAM CHEST 1 VIEW: CPT

## 2024-06-11 PROCEDURE — 93306 TTE W/DOPPLER COMPLETE: CPT

## 2024-06-11 PROCEDURE — 99291 CRITICAL CARE FIRST HOUR: CPT | Performed by: INTERNAL MEDICINE

## 2024-06-11 PROCEDURE — 99222 1ST HOSP IP/OBS MODERATE 55: CPT

## 2024-06-11 PROCEDURE — 83520 IMMUNOASSAY QUANT NOS NONAB: CPT

## 2024-06-11 PROCEDURE — 95715 VEEG EA 12-26HR INTMT MNTR: CPT

## 2024-06-11 PROCEDURE — 99233 SBSQ HOSP IP/OBS HIGH 50: CPT | Performed by: PSYCHIATRY & NEUROLOGY

## 2024-06-11 RX ORDER — ATROPINE SULFATE 1 MG/ML
0.5 INJECTION, SOLUTION INTRAVENOUS ONCE AS NEEDED
Status: DISCONTINUED | OUTPATIENT
Start: 2024-06-11 | End: 2024-06-22 | Stop reason: HOSPADM

## 2024-06-11 RX ORDER — LEVETIRACETAM 500 MG/5ML
375 INJECTION, SOLUTION, CONCENTRATE INTRAVENOUS EVERY 12 HOURS SCHEDULED
Status: COMPLETED | OUTPATIENT
Start: 2024-06-11 | End: 2024-06-11

## 2024-06-11 RX ORDER — MICONAZOLE NITRATE 20 MG/G
CREAM TOPICAL 2 TIMES DAILY
Status: DISCONTINUED | OUTPATIENT
Start: 2024-06-11 | End: 2024-06-22 | Stop reason: HOSPADM

## 2024-06-11 RX ORDER — MIDAZOLAM HYDROCHLORIDE 2 MG/2ML
4 INJECTION, SOLUTION INTRAMUSCULAR; INTRAVENOUS ONCE
Status: DISCONTINUED | OUTPATIENT
Start: 2024-06-11 | End: 2024-06-14

## 2024-06-11 RX ORDER — FENTANYL CITRATE 50 UG/ML
200 INJECTION, SOLUTION INTRAMUSCULAR; INTRAVENOUS ONCE
Status: COMPLETED | OUTPATIENT
Start: 2024-06-11 | End: 2024-06-12

## 2024-06-11 RX ORDER — ACETYLCYSTEINE 200 MG/ML
3 SOLUTION ORAL; RESPIRATORY (INHALATION)
Status: CANCELLED | OUTPATIENT
Start: 2024-06-11

## 2024-06-11 RX ORDER — LORAZEPAM 2 MG/ML
2 INJECTION INTRAMUSCULAR ONCE AS NEEDED
Status: DISCONTINUED | OUTPATIENT
Start: 2024-06-11 | End: 2024-06-14

## 2024-06-11 RX ORDER — PROPOFOL 10 MG/ML
100 INJECTION, EMULSION INTRAVENOUS ONCE
Status: DISCONTINUED | OUTPATIENT
Start: 2024-06-11 | End: 2024-06-14

## 2024-06-11 RX ADMIN — LEVALBUTEROL HYDROCHLORIDE 1.25 MG: 1.25 SOLUTION RESPIRATORY (INHALATION) at 14:07

## 2024-06-11 RX ADMIN — APIXABAN 5 MG: 5 TABLET, FILM COATED ORAL at 17:29

## 2024-06-11 RX ADMIN — GUAIFENESIN 200 MG: 100 SOLUTION ORAL at 03:29

## 2024-06-11 RX ADMIN — FLUOXETINE HYDROCHLORIDE 10 MG: 10 CAPSULE ORAL at 09:22

## 2024-06-11 RX ADMIN — MICONAZOLE NITRATE: 20 CREAM TOPICAL at 17:27

## 2024-06-11 RX ADMIN — SODIUM CHLORIDE SOLN NEBU 3% 4 ML: 3 NEBU SOLN at 14:07

## 2024-06-11 RX ADMIN — PERFLUTREN 0.4 ML/MIN: 6.52 INJECTION, SUSPENSION INTRAVENOUS at 16:05

## 2024-06-11 RX ADMIN — LEVALBUTEROL HYDROCHLORIDE 1.25 MG: 1.25 SOLUTION RESPIRATORY (INHALATION) at 19:47

## 2024-06-11 RX ADMIN — LEVETIRACETAM 750 MG: 100 INJECTION, SOLUTION INTRAVENOUS at 09:16

## 2024-06-11 RX ADMIN — LEVETIRACETAM 380 MG: 100 INJECTION, SOLUTION INTRAVENOUS at 21:44

## 2024-06-11 RX ADMIN — SODIUM CHLORIDE SOLN NEBU 3% 4 ML: 3 NEBU SOLN at 19:48

## 2024-06-11 RX ADMIN — SODIUM CHLORIDE SOLN NEBU 3% 4 ML: 3 NEBU SOLN at 07:26

## 2024-06-11 RX ADMIN — LEVALBUTEROL HYDROCHLORIDE 1.25 MG: 1.25 SOLUTION RESPIRATORY (INHALATION) at 02:19

## 2024-06-11 RX ADMIN — APIXABAN 5 MG: 5 TABLET, FILM COATED ORAL at 09:16

## 2024-06-11 RX ADMIN — CEFEPIME 2000 MG: 2 INJECTION, POWDER, FOR SOLUTION INTRAVENOUS at 21:45

## 2024-06-11 RX ADMIN — GUAIFENESIN 200 MG: 100 SOLUTION ORAL at 09:16

## 2024-06-11 RX ADMIN — GUAIFENESIN 200 MG: 100 SOLUTION ORAL at 15:33

## 2024-06-11 RX ADMIN — SODIUM CHLORIDE SOLN NEBU 3% 4 ML: 3 NEBU SOLN at 02:19

## 2024-06-11 RX ADMIN — PIPERACILLIN SODIUM AND TAZOBACTAM SODIUM 4.5 G: 36; 4.5 INJECTION, POWDER, LYOPHILIZED, FOR SOLUTION INTRAVENOUS at 05:37

## 2024-06-11 RX ADMIN — GUAIFENESIN 200 MG: 100 SOLUTION ORAL at 21:44

## 2024-06-11 RX ADMIN — CHLORHEXIDINE GLUCONATE 0.12% ORAL RINSE 15 ML: 1.2 LIQUID ORAL at 09:16

## 2024-06-11 RX ADMIN — CHLORHEXIDINE GLUCONATE 0.12% ORAL RINSE 15 ML: 1.2 LIQUID ORAL at 21:44

## 2024-06-11 RX ADMIN — CEFEPIME 2000 MG: 2 INJECTION, POWDER, FOR SOLUTION INTRAVENOUS at 14:04

## 2024-06-11 RX ADMIN — LEVALBUTEROL HYDROCHLORIDE 1.25 MG: 1.25 SOLUTION RESPIRATORY (INHALATION) at 07:26

## 2024-06-11 NOTE — DISCHARGE INSTR - OTHER ORDERS
Apply silicone bordered foam dressing to right posterior thigh DTI. Change every other day and as needed for soilage/dislodgement.     Cleanse b/l sacro-buttocks and groin with soap and water, pat dry, and apply JANES antifungal cream BID    Cleanse skin under trach ties with soap and water, and pat completely dry. Cut strips of InterDry and place them flat in the skin folds, leaving 2 inches outside of the skin fold/beyond trach ties. Take InterDry out and cleanse skin with soap and water daily, pat dry and place InterDry back in place. Replace every 3 days or sooner if soiled. Do not place any creams or powders on the skin that InterDry is being placed.     Cleanse L ear wound with saline moistened gauze and pat dry. Apply small silicone bordered foam dressing to cover wound. Change every other day and as needed for soilage/dislodgement.

## 2024-06-11 NOTE — QUICK NOTE
Spoke with patient's sister at bedside, provided medical updates and answered all questions and concerns. Plan for EEG until tomorrow AM, decrease Keppra dose tonight with plan for DC. Will obtain Brain MRI following discontinuation of EEG. Possible bronch tomorrow.     Shara Samano M.D.  [facility] PGY1  6/11/2024, 2:27 PM

## 2024-06-11 NOTE — PROGRESS NOTES
Progress Note - Neurology   Hemanth Swanson 39 y.o. male MRN: [id number]  Unit/Bed#: [facility] 05 Encounter: [id number]      Assessment & Plan     Seizure-like activity (HCC)  Assessment & Plan  40yo male with history of metastatic testicular seminoma S/P chemotherapy, hypertension, bipolar depression, PE on Eliquis    Admitted in August/September 2023 for somnolence/AMS and acute hypoxic respiratory failure/vent dependence with trach (9/2023); had extensive neuro workup with MRI/LP/Video EEG (undiagnosed neuromuscular syndrome?)    Admitted since 5/30 with hypoxia/increased work of breathing.  Neurology consulted for multiple episodes of hypoxia and bradycardia, with few of the episodes with associated seizure-like activity. Routine EEG on 06/02/2024 was normal. It was suspected of reactive seizure in the setting of pneumonia rather than primary epilepsy.      6/9: Neuro asked to re-eval due to 3-4 episode of tonic shaking of the UE with eye rolling and altered for a few mins after. Overnight of 6/9, per ICU  nursing staff, patient had multiple episodes of brooke/hypoxia.     6/10: Around 4 AM, patient had an episode with bowel/bladder incontinence, foaming at the mouth and convulsions. Ativan 2mg PRN was given. Another event just before 6 PM    6/11: Few additional bouts of bradycardia/hypoxia overnight. Video EEG with mild diffuse slowing, no electrographic seizures on EEG.  No clinical seizure activity has been observed throughout duration of monitoring thus far.    Plan:  -Now on video EEG monitoring, discussing results (and length of monitoring) with epileptologist and critical care. Will plan to monitor until at least morning of 6/12 (likely discontinue tomorrow if vEEG remains unrevealing)  -Will decrease Keppra to increase video EEG yield (and with lower suspicion to epileptic cause of seizure activity); will give 375 mg dose tonight, then stop  -Once off video  EEG, recommend MRI brain seizure protocol and IAC  "with and without (had prior bilateral IAC enhancement). Prior MRI done on 11/2023 at [facility] was reported motion degraded.  -Consider switching from Cefepime to a different antibiotic, as Cefepime can lower seizure threshold (has now been on cefepime for approx. 1 week)  -Seizure precautions  -Ativan 2mg PRN for seizures lasting more than 3 mins and notify on call neurology  -Rest of medical management per critical care  -On Eliquis given PE history      Will further discuss plan of care with attending neurologist.    Has upcoming neuromuscular team follow up scheduled for July 17th.     Subjective:   Patient new to examiner, remains intubated on mechanical ventilation, no sedation.    Reviewed neurology follow-up with no yesterday at [facility].  Transferred here for video EEG monitoring.  Reportedly per critical care last evening had another event of hypoxia/bradycardia (HR in the 20s, given atropine). Per discussion with nursing, had few episodes overnight of bradycardia/hypoxia; family at bedside this afternoon. They note patient has been dealing with these ongoing neurologic issues for \"18 months\".     Vitals: Blood pressure 121/75, pulse 80, temperature 98.7 °F (37.1 °C), temperature source Axillary, resp. rate 17, height 6' 4\" (1.93 m), weight 116 kg (256 lb 13.4 oz), SpO2 97%.,Body mass index is 31.26 kg/m².    Patient was undergoing 2D echo and EEG leads being readjusted at time of evaluation (thus deferred majority of neuro exam for now, will continue to monitor neuro exam). Patient was with eyes open, tracking during limited assessment. No clinical seizure activity was observed throughout exam.     Lab, Imaging and other studies:   XR chest portable ICU   Final Result by Lionel Willard MD (06/11 1100)      Hypoventilation with associated basilar atelectasis.            Workstation performed: [id number]         MRI inpatient order    (Results Pending)   XR chest portable ICU    (Results Pending)     CBC: "   Results from last 7 days   Lab Units 06/11/24  0524 06/10/24  0454 06/09/24  0433   WBC Thousand/uL 10.77* 11.47* 10.87*   RBC Million/uL 4.26 4.19 4.43   HEMOGLOBIN g/dL 12.6 12.2 12.9   HEMATOCRIT % 39.4 39.0 40.3   MCV fL 93 93 91   PLATELETS Thousands/uL 232 228 251   , BMP/CMP:   Results from last 7 days   Lab Units 06/11/24  0524 06/10/24  0454 06/09/24  0433 06/08/24  0514   SODIUM mmol/L 136 136 137 136   POTASSIUM mmol/L 3.9 3.9 3.5 3.7   CHLORIDE mmol/L 93* 94* 94* 94*   CO2 mmol/L 36* 34* 35* 33*   BUN mg/dL 18 19 24 20   CREATININE mg/dL 0.54* 0.43* 0.58* 0.56*   CALCIUM mg/dL 9.6 9.3 9.5 9.7   AST U/L 18 20  --  29   ALT U/L 39 41  --  47   ALK PHOS U/L 139* 115*  --  118*   EGFR ml/min/1.73sq m 134 147 130 132   , Vitamin B12:   , HgBA1C:   , TSH:   Results from last 7 days   Lab Units 06/11/24  0524   TSH 3RD GENERATON uIU/mL 4.128   Ammonia:   Results from last 7 days   Lab Units 06/10/24  1338   AMMONIA umol/L 19     VTE Prophylaxis: Sequential compression device (Venodyne)  and Eliquis    Please see attending's attestation for total time spent/billing. Discussed plan of care with patient and primary team: continue on video EEG, obtain MRI brain with/without contrast afterwards, will take off Keppra beginning tomorrow AM.     Please note dictation software was used in the formulation of this note.  Please keep that in mind in light of any grammatical errors.

## 2024-06-11 NOTE — CONSULTS
Consultation - Wound Care   Hemanth Swanson 39 y.o. male MRN: [id number]  Unit/Bed#: MICU 05 Encounter: [id number]    Assessment:  Pressure induced deep tissue damage of right thigh  Dermatitis due to moisture  Cutaneous candidiasis  Fecal incontinence  Ambulatory dysfunction      Plan:  Fungal rash present to bilateral groin and bilateral sacral buttocks in the setting of fecal incontinence.  Rectal tube placed per MICU to manage fecal incontinence.  Will recommend Temo antifungal cream to areas above to provide barrier protectant and treat fungal rash.  Noted areas of scabbing to left ear and right knee.  Recommend to apply 3M no sting Skin-Prep to the areas daily  Right posterior thigh with DTI-present on admission.  Will recommend to apply silicone bordered foam dressing to the wound.  Change every other day.  MASD noted under patient's trach ties.  No skin loss.  Will recommend Interdry sheets to be placed to help with moisture to this location.  No clinical s/s of infection present to the above areas  Will recommend preventative nursing skin care measures- including foam dressings to the b/l heels  Pressure relief- offloading of pressure with turning/repositioning as patient medically tolerates, heel elevation, foam wedges for offloading/repositioning, and waffle cushion to chair.  Nutrition is following  [facility] wound care team with questions or concerns.   Routine wound care follow-up while admitted. AVS updated.   Discussed with nursing at bedside.     History of Present Illness:  Patient is a 39-year-old male who was initially admitted to [facility] with shortness of breath.  Patient was transferred to [facility] for continuous EEG.  Per chart patient also with cardiac arrest during hospitalization.  Patient has a history of metastatic testicular cancer, heart failure, progressive neuromuscular disease, chronic trach and PEG, and hypertension.  Wound care consulted  "for multiple skin alterations noted on transfer.  Patient seen in medical ICU and is critically ill.  Patient unable to provide any wound history.  Nursing provides wound history at bedside during exam.  Nursing states that rectal tube was placed due to frequent loose BMs and patient is developing rash to his sacral buttocks and groin area.  They have been applying Calazime cream to these locations. No reported fever, chills, or increased pain related to the wounds.    Subjective:    Review of Systems   Unable to perform ROS: Intubated       Historical Information   Past Medical History:   Diagnosis Date    Anxiety     Cancer (HCC)     Depression     Migration of percutaneous endoscopic gastrostomy (PEG) tube (HCC) 09/24/2023    Psychiatric disorder     bipolar     Past Surgical History:   Procedure Laterality Date    IR BIOPSY LYMPH NODE  01/20/2023    IR GASTROSTOMY TUBE PLACEMENT  09/25/2023    IR LUMBAR PUNCTURE  09/06/2023    IR PORT PLACEMENT  03/14/2023    ORCHIECTOMY Left 12/14/2022    Procedure: ORCHIECTOMY INGUINAL;  Surgeon: Flip Michael MD;  Location:  MAIN OR;  Service: Urology    PEG W/TRACHEOSTOMY PLACEMENT N/A 09/08/2023    Procedure: TRACHEOSTOMY WITH INSERTION PEG TUBE;  Surgeon: Rudy Mcmanus MD;  Location: [facility] MAIN OR;  Service: General    TESTICLE SURGERY       Social History   Social History     Substance and Sexual Activity   Alcohol Use Not Currently    Comment: Former alcoholic. Last use in 2016     Social History     Substance and Sexual Activity   Drug Use Not Currently    Types: Marijuana, \"Crack\" cocaine    Comment: Current use medical marijuana. Not currently using crack cocaine.     E-Cigarette/Vaping    E-Cigarette Use Former User     Start Date 1/1/18     Quit Date 6/1/23      E-Cigarette/Vaping Substances    Nicotine Yes     THC Yes     CBD No     Flavoring No     Other No     Unknown No      Social History     Tobacco Use   Smoking Status Former    Current packs/day: " "0.00    Average packs/day: 0.5 packs/day for 15.4 years (7.7 ttl pk-yrs)    Types: Cigarettes    Start date: 2008    Quit date: 2023    Years since quittin.0   Smokeless Tobacco Never     Family History:   Family History   Problem Relation Age of Onset    Coronary artery disease Maternal Aunt     Cancer Father     Diabetes Father     Hypertension Father        Meds/Allergies   current meds:   Current Facility-Administered Medications   Medication Dose Route Frequency    acetaminophen (TYLENOL) oral suspension 650 mg  650 mg Oral Q8H PRN    apixaban (ELIQUIS) tablet 5 mg  5 mg Per PEG Tube BID    Atropine Sulfate injection 0.5 mg  0.5 mg Intravenous Once PRN    ceFEPime (MAXIPIME) 2,000 mg in dextrose 5 % 50 mL IVPB  2,000 mg Intravenous Q8H    chlorhexidine (PERIDEX) 0.12 % oral rinse 15 mL  15 mL Mouth/Throat Q12H ISAEL    fentaNYL injection 200 mcg  200 mcg Intravenous Once    FLUoxetine (PROzac) capsule 10 mg  10 mg Per G Tube Daily    guaiFENesin (ROBITUSSIN) oral liquid 200 mg  200 mg Oral Q6H    levalbuterol (XOPENEX) inhalation solution 1.25 mg  1.25 mg Nebulization Q6H    levETIRAcetam (KEPPRA) injection 380 mg  380 mg Intravenous Q12H ISAEL    LORazepam (ATIVAN) injection 2 mg  2 mg Intravenous Once PRN    midazolam (VERSED) injection 4 mg  4 mg Intravenous Once    moisture barrier miconazole 2% cream (aka JANES MOISTURE BARRIER ANTIFUNGAL CREAM)   Topical BID    propofol (DIPRIVAN) 200 MG/20ML bolus injection 100 mg  100 mg Intravenous Once    [START ON 2024] scopolamine (TRANSDERM-SCOP) 1 mg/3 days TD 72 hr patch 1 patch  1 patch Transdermal Q72H    sodium chloride 3 % inhalation solution 4 mL  4 mL Nebulization Q6H     Allergies   Allergen Reactions    Dog Epithelium Cough, Sneezing and Nasal Congestion       Objective   Vitals: Blood pressure 110/71, pulse 81, temperature 98.4 °F (36.9 °C), temperature source Oral, resp. rate 20, height 6' 4\" (1.93 m), weight 116 kg (256 lb 13.4 oz), SpO2 " 99%.     Wounds:   Left ear and right knee with areas of resolved wounds. Present as areas of well adhered dry scabbing.  No open aspect, redness, or drainage.  Left lateral neck skin fold/skin located under trach tie with MASD. Presents as dry and intact with fragile blanchable pink/hyperpigmented skin.  No open aspect, redness, or active drainage on exam.  No fungal rash present.   Bilateral groin with fungal rash present.  Noted irregular borders present. No skin loss. No open aspect, redness or drainage.   Bilateral sacral buttocks are intact with fragile pink/hyperpigmented skin and scar tissue.  Noted fungal rash present with irregular borders.  Areas of macerated/denuded skin present.  No open aspect, redness, or drainage.  B/l heels are dry and intact without redness or wounds.   Right posterior thigh with DTI.  Area presents as 2 well-defined shaped dry and intact blood blisters measured together.  Suspect patient may have laid on an object.  No open aspect or drainage.  Periwound is dry and intact.      No induration, fluctuance, odor, warmth/temperature differences, redness, or purulence noted to the above mentioned wounds and skin areas assessed. New dressings applied as noted above. Patient tolerated assessment well- no s/s of non-verbal pain or discomfort observed during the encounter.        Wound 09/08/23 Other (comment) Neck Mid (Active)   Wound Image   06/11/24 1332       Wound 06/04/24 Pressure Injury Ear Left (Active)   Wound Image   06/11/24 1332   Wound 06/04/24 Abrasion(s) Knee Anterior;Right (Active)   Wound Image   06/11/24 1333       Wound 06/11/24 MASD Perineum (Active)   Wound Image   06/11/24 1334   Wound 06/11/24 Thigh Distal;Posterior;Right (Active)   Wound Image   06/11/24 1336   Wound Length (cm) 4 cm 06/11/24 1336   Wound Width (cm) 1.5 cm 06/11/24 1336   Wound Depth (cm) 0 cm 06/11/24 1336   Wound Surface Area (cm^2) 6 cm^2 06/11/24 1336   Wound Volume (cm^3) 0 cm^3 06/11/24 1336    Calculated Wound Volume (cm^3) 0 cm^3 06/11/24 1336         Physical Exam  Constitutional:       General: He is not in acute distress.     Appearance: He is obese. He is ill-appearing. He is not diaphoretic.      Interventions: He is intubated.   HENT:      Head: Normocephalic and atraumatic.      Right Ear: External ear normal.      Left Ear: External ear normal.   Eyes:      Conjunctiva/sclera: Conjunctivae normal.   Pulmonary:      Effort: Pulmonary effort is normal. No respiratory distress. He is intubated.   Genitourinary:     Comments: Incontinent of urine per nursing.  Fecal incontinence being managed via indwelling system.    There is no erythema or skin changes noted to the penile shaft or scrotum. There is no  penile discharge on exam. Unable to assess for scrotal or penile pain on exam due to medical status.   Musculoskeletal:      Comments: Patient seen in bed on low-air-loss critical care mattress.  Patient dependent for care.  Max assist of 2 with turning for the assessment.  Foam wedges are used for repositioning.   Skin:     General: Skin is warm and dry.      Findings: Wound present.      Comments: Refer to wound section for assessment details.    Neurological:      Mental Status: He is alert.      Gait: Gait abnormal.           Lab, Imaging and other studies: I have personally reviewed pertinent reports.      Code Status: Level 1 - Full Code      Counseling / Coordination of Care  Total time spent today:    Total time (face-to-face and non-face-to-face) spent on today's visit was 31 minutes. This includes preparation for the visits (H&P on 6/10/24) performance of a medically appropriate history and examination, and orders for medications/treatments or testing.  Discussed assessment findings, and plan of care/recommendations with patients RN.      Wendi Macedo, AMBER, ROMELC, GRAYSON      Portions of the record may have been created with voice recognition software.  Occasional wrong word or  "\"sound a like\" substitutions may have occurred due to the inherent limitations of voice recognition software.  Read the chart carefully and recognize, using context, where substitutions have occurred      "

## 2024-06-11 NOTE — UTILIZATION REVIEW
Initial Clinical Review    Admission: Date/Time/Statement:   Admission Orders (From admission, onward)       Ordered        06/10/24 1325  INPATIENT ADMISSION  Once                          Orders Placed This Encounter   Procedures    INPATIENT ADMISSION     Standing Status:   Standing     Number of Occurrences:   1     Order Specific Question:   Level of Care     Answer:   Critical Care [15]     Order Specific Question:   Estimated length of stay     Answer:   More than 2 Midnights     Order Specific Question:   Certification     Answer:   I certify that inpatient services are medically necessary for this patient for a duration of greater than two midnights. See H&P and MD Progress Notes for additional information about the patient's course of treatment.     ED Arrival Information       Patient not seen in ED                       No chief complaint on file.      Initial Presentation: 39 y.o. male w/ PMH of left metastatic testicular seminoma, HFpEF, HTN, CROW,  progressive neuromuscular disease, trach/PEG dependent, vent dependent, h/o TBI was transferred from [facility] to [facility] for a higher level of care.  Pt initially presented to [facility] on 05/30 d/t decreased O2 sat, SOB while at is PCP. CT positive for multilobar pneumonia. Had asystole on 5/31- 2 rounds of CPR with ROSC.  Bronchoscopy- Serratia and Pseudomonas.  Initially on ceftriaxone then switched to cefepime--->zosyn due to concern for neurotoxicity. Has had intermittent bradycardia and hypoxemia.  Requiring ativan, atropine, BVM ventilation.  Suspicion for seizures. Had an EEG at [facility] which was normal.  Neurology at [facility] recommended Brain MRI and continuous EEG so was sent to [facility] MICU.  On arrival to [facility], pt bradycardic and hypoxemic- required BVM and then returned back to baseline. Lactic ammonia WNL.     Admit as Inpatient for evaluation and treatment of Seizure-like activity, Toxic metabolic encephalopathy,  Ventilator associated pneumonia.  Plan: Continuous EEG. MRI brain eventually. Neurology consulted. eppra 750 mg BID. Check an echo. Keep atropine at bedside. Next time there is bradycardia get an EKG. Continue on AC/VC 18/500/6/40- increase PEEP to 8. cont levalbuterol nebs, 3% saline nebs. Continue tube feeds. Monitor electrolytes. Bowel regimen. Cont Eliquis. switched to zosyn today.     Date: 06/11   Day 2: Pt had 3 additional episodes of bradycardia and hypoxia since [facility]. He was bagged during these episodes and received a dose of 1 amp atropine. Cont continuous EEG. Obtain brain MRI. Continue Keppra 750 mg BID. Ativan 2 mg PRN seizure-like activity > 3 min. Neuro ff. Hold TF. Possible bronch today. Freq suctioning. Cont cardiac monitoring. Rpt echo. Xopenex and hypertonic saline nebs. Consider resuming mucomyst. Cont IV Zosyn through 06/12.     Date: 06/12  Day 3: Has surpassed a 2nd midnight with active treatments and services.  Patient had one episode of hypoxia and bradycardia overnight around 5 am. Rhythmic jerking of left arm noted at the time however patient was able to follow commands during this time period. Cxr yesterday showed hypoinflation, peep increased to 15.   Today, on exam, able to nod and shakes head  in response to questions, able to mouth words and communicates appropriately.  Trach on vent, no change on vent settings at this time, PEEP 15. Echo 6/11 showing EF of 65%. Consider dc EEG this am. MRI brain following EEG. Keppra decreased to 375 mg last night, plan to DC today. Ativan 2 mg PRN seizure-like activity > 3 min. NPO for now. Possible bronchoscopy today. Repeat cxr this am.   Continue eliquis     ED Triage Vitals   Temperature Pulse Respirations Blood Pressure SpO2   06/10/24 1400 06/10/24 1400 06/10/24 1400 06/10/24 1400 06/10/24 1400   98.7 °F (37.1 °C) 70 13 123/80 100 %      Temp Source Heart Rate Source Patient Position - Orthostatic VS BP Location FiO2 (%)    06/10/24 1600 06/10/24 1600 06/11/24 0932 06/10/24 1600 06/11/24 0719   Axillary Monitor Lying Right arm 40      Pain Score       06/12/24 0800       No Pain          Wt Readings from Last 1 Encounters:   06/12/24 116 kg (254 lb 13.6 oz)     Additional Vital Signs:     Date/Time Temp Pulse Resp BP MAP (mmHg) SpO2 FiO2 (%) O2 Device Patient Position - Orthostatic VS   06/11/24 0932 98.3 °F (36.8 °C) 74 16 110/61 78 99 % -- -- Lying   06/11/24 0900 -- 63 13 89/52 Abnormal  64 Abnormal  98 % -- -- --   06/11/24 0800 -- 77 14 96/58 72 99 % -- -- --   06/11/24 0719 -- 80 17 -- -- 97 % 40  Ventilator --   FiO2 (%): 12/500/8 peep at 06/11/24 0719   06/11/24 0700 -- 82 17 121/75 94 96 % -- -- --   06/11/24 0600 -- 77 16 100/53 74 98 % -- -- --   06/11/24 0500 -- 49 Abnormal  34 Abnormal  141/89 110 99 % -- -- --   06/11/24 0400 98.7 °F (37.1 °C) 61 15 98/62 75 95 % -- -- --   06/11/24 0300 -- 59 15 89/54 Abnormal  66 99 % -- -- --   06/11/24 0200 -- 59 13 104/68 82 100 % -- -- --   06/11/24 0145 -- 65 22 90/60 71 99 % -- -- --   06/11/24 0100 -- 62 17 83/55 Abnormal  64 Abnormal  97 % -- -- --   06/11/24 0000 98.6 °F (37 °C) 62 14 90/53 68 97 % -- -- --   06/10/24 2308 -- 42 Abnormal  17 -- -- 100 % -- -- --   06/10/24 2305 -- 50 Abnormal  23 Abnormal  155/90 115 100 % -- -- --   06/10/24 2302 -- 50 Abnormal  44 Abnormal  -- -- 75 % Abnormal  -- -- --   06/10/24 2200 -- 62 12 93/59 71 97 % -- -- --   06/10/24 2100 -- 66 19 93/61 73 97 % -- -- --   06/10/24 2000 98.8 °F (37.1 °C) 66 12 93/57 70 97 % -- -- --   06/10/24 1900 -- 79 17 83/52 Abnormal  62 Abnormal  96 % -- -- --   06/10/24 1700 -- 60 12 102/69 81 98 % -- -- --   06/10/24 1600 98.9 °F (37.2 °C) 63 12 108/65 82 99 % -- Ventilator --   06/10/24 1500 -- 73 13 107/74 86 100 % -- -- --     Pertinent Labs/Diagnostic Test Results:   XR chest portable ICU   Final Result by Jewel Burton MD (06/12 4161)      Bibasilar atelectasis. Suboptimal inspiration.             Workstation performed: [id number]         XR chest portable ICU   Final Result by Lionel Willard MD (06/11 1100)      Hypoventilation with associated basilar atelectasis.            Workstation performed: [id number]         MRI inpatient order    (Results Pending)         Results from last 7 days   Lab Units 06/12/24 0444 06/11/24  0524 06/10/24  0454 06/09/24  0433 06/08/24  0514 06/07/24  0453 06/06/24  0439   WBC Thousand/uL 7.20 10.77* 11.47* 10.87* 13.41* 11.54* 10.26*   HEMOGLOBIN g/dL 11.4* 12.6 12.2 12.9 13.6 13.2 12.4   HEMATOCRIT % 36.2* 39.4 39.0 40.3 42.0 40.7 38.4   PLATELETS Thousands/uL 208 232 228 251 259 244 227   TOTAL NEUT ABS Thousands/µL  --   --   --  8.27*  --  9.46* 7.94*         Results from last 7 days   Lab Units 06/12/24 0444 06/11/24  0524 06/10/24  0454 06/09/24  0433 06/08/24  0514 06/07/24  0453 06/06/24  0439   SODIUM mmol/L 139 136 136 137 136   < > 136   POTASSIUM mmol/L 3.2* 3.9 3.9 3.5 3.7   < > 3.9   CHLORIDE mmol/L 94* 93* 94* 94* 94*   < > 99   CO2 mmol/L 36* 36* 34* 35* 33*   < > 31   ANION GAP mmol/L 9 7 8 8 9   < > 6   BUN mg/dL 16 18 19 24 20   < > 13   CREATININE mg/dL 0.43* 0.54* 0.43* 0.58* 0.56*   < > 0.58*   EGFR ml/min/1.73sq m 147 134 147 130 132   < > 130   CALCIUM mg/dL 9.1 9.6 9.3 9.5 9.7   < > 9.4   CALCIUM, IONIZED mmol/L 1.15 1.19 1.09*  --  1.11*  --  1.15   MAGNESIUM mg/dL 2.1 2.3 2.5  --  2.4  --  2.2   PHOSPHORUS mg/dL 3.2 4.2 3.5  --  3.6  --  3.5    < > = values in this interval not displayed.     Results from last 7 days   Lab Units 06/12/24 0444 06/11/24 0524 06/10/24  1338 06/10/24  0454 06/08/24  0514   AST U/L 18 18  --  20 29   ALT U/L 37 39  --  41 47   ALK PHOS U/L 128* 139*  --  115* 118*   TOTAL PROTEIN g/dL 7.0 7.8  --  7.7 8.4   ALBUMIN g/dL 3.7 4.0  --  3.9 4.2   TOTAL BILIRUBIN mg/dL 0.53 0.77  --  0.57 0.59   AMMONIA umol/L  --   --  19  --   --          Results from last 7 days   Lab Units 06/12/24 0444 06/11/24  0524  "06/10/24  0454 06/09/24  0433 06/08/24  0514 06/07/24  0453 06/06/24  0439   GLUCOSE RANDOM mg/dL 108 95 98 110 101 128 101             No results found for: \"BETA-HYDROXYBUTYRATE\"   Results from last 7 days   Lab Units 06/10/24  1345 06/09/24  1318 06/09/24  0612   PH ART  7.426 7.429 7.339*   PCO2 ART mm Hg 54.2* 46.1* 66.5*   PO2 ART mm Hg 102.5 105.3 95.1   HCO3 ART mmol/L 34.8* 29.8* 35.0*   BASE EXC ART mmol/L 8.7 4.7 6.7   O2 CONTENT ART mL/dL 18.5 18.9 19.6   O2 HGB, ARTERIAL % 96.8 97.7* 94.3   ABG SOURCE  Radial, Right Radial, Right Radial, Right     Results from last 7 days   Lab Units 06/08/24  1354   PH SYLVESTER  7.420*   PCO2 SYLVESTER mm Hg 50.0   PO2 SYLVESTER mm Hg 33.1*   HCO3 SYLVESTER mmol/L 31.7*   BASE EXC SYLVESTER mmol/L 5.9   O2 CONTENT SYLVESTER ml/dL 12.7   O2 HGB, VENOUS % 62.3                         Results from last 7 days   Lab Units 06/11/24  0524   TSH 3RD GENERATON uIU/mL 4.128     Results from last 7 days   Lab Units 06/09/24  0433 06/08/24  0514   PROCALCITONIN ng/ml 0.11 0.11     Results from last 7 days   Lab Units 06/10/24  1338   LACTIC ACID mmol/L 1.1                         ED Treatment:   Medication Administration - No Administrations Displayed (No Start Event Found)       None          Past Medical History:   Diagnosis Date    Anxiety     Cancer (HCC)     Depression     Migration of percutaneous endoscopic gastrostomy (PEG) tube ([facility]) 09/24/2023    Psychiatric disorder     bipolar     Present on Admission:   Umbilical hernia without obstruction and without gangrene   Depression   Seizure-like activity (HCC)      Admitting Diagnosis: Sepsis (HCC) seizure  Age/Sex: 39 y.o. male  Admission Orders:  SCD  vEEG  Cardio-Pulmonary Monitoring, Neuro Checks, Nursing dysphagia screen, I/O, Daily weights, Vital signs  Restraints non violent    Scheduled Medications:  apixaban, 5 mg, Per PEG Tube, BID  cefepime, 2,000 mg, Intravenous, Q8H  chlorhexidine, 15 mL, Mouth/Throat, Q12H ISAEL  FLUoxetine, 10 mg, Per G Tube, " Daily  guaiFENesin, 200 mg, Oral, Q6H  levalbuterol, 1.25 mg, Nebulization, Q6H  levETIRAcetam, 750 mg, Intravenous, Q12H ISAEL  [START ON 6/12/2024] scopolamine, 1 patch, Transdermal, Q72H  senna, 1 tablet, Oral, HS  sodium chloride, 4 mL, Nebulization, Q6H  piperacillin-tazobactam (ZOSYN) 4.5 g in sodium chloride 0.9 % 100 mL IVPB (EXTENDED INFUSION)  Dose: 4.5 g  Freq: Every 8 hours Route: IV  Last Dose: Stopped (06/11/24 0937)  Start: 06/10/24 1400 End: 06/11/24 0953    Continuous IV Infusions:     PRN Meds:  acetaminophen, 650 mg, Oral, Q8H PRN  atropine, 0.5 mg, Intravenous, Once PRN  LORazepam, 2 mg, Intravenous, Once PRN        IP CONSULT TO NEUROLOGY    Network Utilization Review Department  ATTENTION: Please call with any questions or concerns to [phone] and carefully listen to the prompts so that you are directed to the right person. All voicemails are confidential.   For Discharge needs, contact Care Management DC Support Team at [phone] opt. 2  Send all requests for admission clinical reviews, approved or denied determinations and any other requests to dedicated fax number below belonging to the [facility] where the patient is receiving treatment. List of dedicated fax numbers for the Facilities:  FACILITY NAME UR FAX NUMBER   ADMISSION DENIALS (Administrative/Medical Necessity) [phone]   DISCHARGE SUPPORT TEAM (NETWORK) [phone]   PARENT CHILD HEALTH (Maternity/NICU/Pediatrics) [phone]   [facility] [phone]   [facility] [phone]   [facility] [phone]   [facility] [phone]   [facility] [phone]   [facility] [phone]   [facility] [phone]   [facility] [phone]   [facility] [phone]   [facility]  [facility] [phone]   [facility] [phone]   [facility] [phone]

## 2024-06-11 NOTE — UTILIZATION REVIEW
NOTIFICATION OF INPATIENT ADMISSION   AUTHORIZATION REQUEST   SERVICING FACILITY:   [facility]  Address: [address] [place] [address]  Tax ID: [id number]  NPI: [id number] ATTENDING PROVIDER:  Attending Name and NPI#: Romie Grimes Md [[id number]]  Address: [address] [place] [address]  Phone: [phone]   ADMISSION INFORMATION:  Place of Service: Inpatient [facility]  Place of Service Code: 21  Inpatient Admission Date/Time: 6/10/24  1:18 PM  Discharge Date/Time: No discharge date for patient encounter.  Admitting Diagnosis Code/Description:  Sepsis (HCC) seizure     UTILIZATION REVIEW CONTACT:  Adrian Roy, Utilization   Network Utilization Review Department  Phone: [phone]  Fax: [phone]  Email: [email or web address]  Contact for approvals/pending authorizations, clinical reviews, and discharge.     PHYSICIAN ADVISORY SERVICES:  Medical Necessity Denial & Nlzm-pc-Cfjb Review  Phone: [phone]  Fax: [phone]  Email: [email or web address]     DISCHARGE SUPPORT TEAM:  For Patients Discharge Needs & Updates  Phone: [phone] opt. 2 Fax: [phone]  Email: [email or web address]

## 2024-06-11 NOTE — ASSESSMENT & PLAN NOTE
38yo male with history of metastatic testicular seminoma S/P chemotherapy, hypertension, bipolar depression, PE on Eliquis    Admitted in August/September 2023 for somnolence/AMS and acute hypoxic respiratory failure/vent dependence with trach (9/2023); had extensive neuro workup with MRI/LP/Video EEG (undiagnosed neuromuscular syndrome?)    Admitted since 5/30 with hypoxia/increased work of breathing.  Neurology consulted for multiple episodes of hypoxia and bradycardia, with few of the episodes with associated seizure-like activity. Routine EEG on 06/02/2024 was normal. It was suspected of reactive seizure in the setting of pneumonia rather than primary epilepsy.      6/9: Neuro asked to re-eval due to 3-4 episode of tonic shaking of the UE with eye rolling and altered for a few mins after. Overnight of 6/9, per ICU  nursing staff, patient had multiple episodes of brooke/hypoxia.     6/10: Around 4 AM, patient had an episode with bowel/bladder incontinence, foaming at the mouth and convulsions. Ativan 2mg PRN was given. Brought to [facility] [place] for video EEG. Another event just before 6 PM    6/11: Few additional bouts of bradycardia/hypoxia overnight. Video EEG with mild diffuse slowing, no electrographic seizures on EEG.  No clinical seizure activity has been observed throughout duration of monitoring thus far. Tapering off Keppra    6/12: Now off Keppra, event at 5 AM of bradycardia/hypoxia with left arm rhythmic jerking (following commands during this episode); no EEG correlate/changes during this episode. EEG otherwise without seizure/epileptiform abnormalities, thus will discontinue    Plan:  -See above, will discontinue video EEG this AM  -Now off Keppra, will continue off given unremarkable video EEG  -Recommend MRI brain seizure protocol and IAC with and without (had prior bilateral IAC enhancement). Prior MRI done on 11/2023 at [facility] was reported motion degraded.  -Consider switching from Cefepime to a different  antibiotic, as Cefepime can lower seizure threshold (has now been on cefepime for approx. 1 week)  -Seizure precautions  -Ativan 2 mg PRN for seizures lasting more than 3 mins and notify on call neurology  -Rest of medical management per critical care  -On Eliquis given PE history

## 2024-06-11 NOTE — PROGRESS NOTES
[facility]  Progress Note: Critical Care  Name: Hemanth Swanson 39 y.o. male I MRN: [id number]  Unit/Bed#: MICU 05 I Date of Admission: 6/10/2024   Date of Service: 6/11/2024 I Hospital Day: 1    Assessment & Plan   Seizure-like activity  S/p cardiac arrest  Ventilator associated pneumonia  Neuromuscular disorder  Testicular seminoma, s/p ochiectomy/chemo  Trach and peg dependence  CROW        Neuro:   Diagnosis: Seizure-like activity  Continuous EEG  Brain MRI (EEG not MRI compatible)  Continue Keppra 750 mg BID  Ativan 2 mg PRN seizure-like activity > 3 min   Neurology following appreciate recs  Patient had another episode of hypoxia and bradycardia without tonic clonic movements on arrival to hospital 6/10 and again at ~6:20 pm on 6/10 at which time he was on EEG  Lactic and ammonia obtained on arrival wnl  Diagnosis: Neuromuscular disorder  Patient has completed both outpatient and inpatient workup without any definitve diagnosis  Possible  seronegative myasthenia variation due to chemo vs anoxic brain injury  No oral diet  Holding tube feeds  Neurology following, appreciate recommendations  Diagnosis: Altered mental status   In the setting of infection with seizure-like activity   Plan: delirium precautions  Monitor oxygen saturation  Maintain sleep wake cycle  Lactic and ammonia obtained on arrival wnl     CV:   Diagnosis: Bradycardia  Plan: Patient continues with episodes of bradycardia  Cardiac monitoring  Atropine prn  Lactic and ammonia obtained on arrival wnl  Diagnosis: s/p cardiac arrest with ROSC  Patient had episode of pulseless asystole without hypoxia, ROSC obtained after 2 rounds ACLS with clearance of secretions on 5/31  Continuous cardiac monitoring  Frequent suctioning  Diagnosis: HFpEF  Plan:   Echo in 8/23 showing EF 60%  Repeat Echo ordered  Daily weights  Strict Is and Os        Pulm:  Diagnosis: Ventilator associated multifocal pneumonia  Plan: Home  Trilogy ventilator settings AC/VC /RR 18/Peep 6 + 6 L O2 into ventilator. Uses albuterol q6 and mucomyst q6  Xopenex and hypertonic saline nebs  Consider resuming mucomyst  Frequent suctioning  Chest PT  Holding feeds for possible bronchoscopy today  Intermittent episodes of hypoxia  ABG on admission showing elevated Pco2 at 54.2, normal PO2 at 102.5, bicarb elevated at 34.8  Diagnosis: History pulmonary embolism  Hospitalized for right lower lobe segmental PE in August 2023, on eliquis since discharge  Plan:   Consider discontinuing eliquis and instead resuming standard heparin for DVT ppx     GI:   No acute issues, continue tube feeds     :   No acute issues  Sheppard in place for 9 days, continue        F/E/N:   F: none  E: monitor and replete as needed  N: tube feeds: Jevity 1.5 continuous 70 cc/hr w/ 200 cc free water flushes q4h (holding tube feeds for possible bronch)        Heme/Onc:   Leukocytosis in the setting of multifocal pneumonia  Continue to monitor WBC count on antibiotics  Hgb stable  Platelets stable        Endo:   No acute problems  Will check TSH and am cortisol        ID:   Diagnosis: Ventilator associated multifocal pneumonia  Plan: Pseudomonas and serretia grown from bronchial washings  On ceftriaxone then transitioned to cefepime when cultures showed pseudomonas   Cefepime discontinued in setting of seizure like activity   Zosyn for full 14 day course (through 6/12)    Disposition: Critical care    ICU Core Measures     Vented Patient  VAP Bundle  VAP bundle ordered     A: Assess, Prevent, and Manage Pain Has pain been assessed? Yes  Need for changes to pain regimen? No   B: Both Spontaneous Awakening Trials (SATs) and Spontaneous Breathing Trials (SBTs) Plan to perform spontaneous awakening trial today? N/A   Plan to perform spontaneous breathing trial today? Chronic vent   Obvious barriers to extubation? NA   C: Choice of Sedation RASS Goal: 0 Alert and Calm  Need for changes to  sedation or analgesia regimen? No   D: Delirium CAM-ICU: Negative   E: Early Mobility  Plan for early mobility? Yes   F: Family Engagement Plan for family engagement today? Yes       Antibiotic Review: Patient on appropriate coverage based on culture data.     Review of Invasive Devices:      Central access plan: Medications requiring central line      Prophylaxis:  VTE VTE covered by:  apixaban, Per PEG Tube, 5 mg at 06/10/24 1758       Stress Ulcer  not ordered        Significant 24hr Events     24hr events: Since admission to [facility] patient had 3 additional episodes of bradycardia and hypoxia. Patient was bagged during these episodes and received a dose of 1 amp atropine.     Subjective   Patient seen and examined at bedside this am. Patient nods and shakes head in response to questions.   Review of Systems: See HPI for Review of Systems     Objective                            Vitals I/O      Most Recent Min/Max in 24hrs   Temp 98.7 °F (37.1 °C) Temp  Min: 98.4 °F (36.9 °C)  Max: 98.9 °F (37.2 °C)   Pulse 77 Pulse  Min: 42  Max: 79   Resp 16 Resp  Min: 12  Max: 44   /53 BP  Min: 83/55  Max: 155/90   O2 Sat 98 % SpO2  Min: 75 %  Max: 100 %      Intake/Output Summary (Last 24 hours) at 6/11/2024 0627  Last data filed at 6/11/2024 0601  Gross per 24 hour   Intake 289.58 ml   Output 0 ml   Net 289.58 ml       Diet Enteral/Parenteral; Tube Feeding No Oral Diet; Jevity 1.5; Continuous; 70; 200; Water; Every 4 hours    Invasive Monitoring           Physical Exam   Physical Exam  Eyes:      Extraocular Movements: Extraocular movements intact.      Pupils: Pupils are equal, round, and reactive to light.   Skin:     General: Skin is warm and dry.      Capillary Refill: Capillary refill takes less than 2 seconds.   HENT:      Head: Normocephalic and atraumatic.      Mouth/Throat:      Mouth: Mucous membranes are moist.   Neck:      Trachea: Tracheostomy present.   Cardiovascular:      Rate and Rhythm: Normal rate and  regular rhythm.      Pulses: Normal pulses.   Musculoskeletal:      Right lower leg: No edema.      Left lower leg: No edema.   Abdominal:      Palpations: Abdomen is soft.   Constitutional:       Appearance: He is well-developed.      Interventions: He is intubated and restrained.   Pulmonary:      Effort: He is intubated.      Breath sounds: No wheezing, rhonchi or rales.   Neurological:      Mental Status: He is alert. He is calm.      Comments: Follows commands, shakes head and nods in response to questions            Diagnostic Studies      EKG: NSR  Imaging: AM cxr per my read shows bilateral consolidations worse from prior CXR      Medications:  Scheduled PRN   apixaban, 5 mg, BID  atropine, ,   chlorhexidine, 15 mL, Q12H ISAEL  FLUoxetine, 10 mg, Daily  guaiFENesin, 200 mg, Q6H  levalbuterol, 1.25 mg, Q6H  levETIRAcetam, 750 mg, Q12H ISAEL  piperacillin-tazobactam, 4.5 g, Q8H  [START ON 6/12/2024] scopolamine, 1 patch, Q72H  senna, 1 tablet, HS  sodium chloride, 4 mL, Q6H      acetaminophen, 650 mg, Q8H PRN  atropine, ,   atropine, 0.5 mg, Once PRN  LORazepam, 2 mg, Once PRN       Continuous          Labs:    CBC    Recent Labs     06/10/24  0454 06/11/24  0524   WBC 11.47* 10.77*   HGB 12.2 12.6   HCT 39.0 39.4    232     BMP    Recent Labs     06/10/24  0454 06/11/24  0524   SODIUM 136 136   K 3.9 3.9   CL 94* 93*   CO2 34* 36*   AGAP 8 7   BUN 19 18   CREATININE 0.43* 0.54*   CALCIUM 9.3 9.6       Coags    No recent results     Additional Electrolytes  Recent Labs     06/10/24  0454 06/11/24  0524   MG 2.5 2.3   PHOS 3.5 4.2   CAIONIZED 1.09* 1.19          Blood Gas    Recent Labs     06/10/24  1345   PHART 7.426   FLX8FAH 54.2*   PO2ART 102.5   GYC5WRJ 34.8*   BEART 8.7   SOURCE Radial, Right     Recent Labs     06/10/24  1345   SOURCE Radial, Right    LFTs  Recent Labs     06/10/24  0454 06/11/24  0524   ALT 41 39   AST 20 18   ALKPHOS 115* 139*   ALB 3.9 4.0   TBILI 0.57 0.77       Infectious  No  recent results  Glucose  Recent Labs     06/10/24  0454 06/11/24  0524   GLUC 98 95               Shara Samano MD

## 2024-06-11 NOTE — QUICK NOTE
Called family to provide updates, no answer. Will call again later.     Shara Samano M.D.  Located within [facility] PGY1  6/11/2024, 1:07 PM

## 2024-06-11 NOTE — NUTRITION
Nutrition Note       06/11/24 1818   Recommendations/Interventions   Interventions/Recommendations Continue EN as ordered   Recommendations to Provider Continue home EN regimen as currently ordered. Noted rectal tube placed today, consider adding banatrol for loose stools, recommended dose with TF is one packet TID.

## 2024-06-12 ENCOUNTER — APPOINTMENT (INPATIENT)
Dept: GASTROENTEROLOGY | Facility: HOSPITAL | Age: 39
DRG: 720 | End: 2024-06-12
Payer: COMMERCIAL

## 2024-06-12 ENCOUNTER — APPOINTMENT (INPATIENT)
Dept: RADIOLOGY | Facility: HOSPITAL | Age: 39
DRG: 720 | End: 2024-06-12
Payer: COMMERCIAL

## 2024-06-12 LAB
ALBUMIN SERPL BCG-MCNC: 3.7 G/DL (ref 3.5–5)
ALP SERPL-CCNC: 128 U/L (ref 34–104)
ALT SERPL W P-5'-P-CCNC: 37 U/L (ref 7–52)
ANION GAP SERPL CALCULATED.3IONS-SCNC: 9 MMOL/L (ref 4–13)
AST SERPL W P-5'-P-CCNC: 18 U/L (ref 13–39)
BILIRUB SERPL-MCNC: 0.53 MG/DL (ref 0.2–1)
BUN SERPL-MCNC: 16 MG/DL (ref 5–25)
CA-I BLD-SCNC: 1.15 MMOL/L (ref 1.12–1.32)
CALCIUM SERPL-MCNC: 9.1 MG/DL (ref 8.4–10.2)
CHLORIDE SERPL-SCNC: 94 MMOL/L (ref 96–108)
CO2 SERPL-SCNC: 36 MMOL/L (ref 21–32)
CREAT SERPL-MCNC: 0.43 MG/DL (ref 0.6–1.3)
ERYTHROCYTE [DISTWIDTH] IN BLOOD BY AUTOMATED COUNT: 14.8 % (ref 11.6–15.1)
GFR SERPL CREATININE-BSD FRML MDRD: 147 ML/MIN/1.73SQ M
GLUCOSE SERPL-MCNC: 108 MG/DL (ref 65–140)
HCT VFR BLD AUTO: 36.2 % (ref 36.5–49.3)
HGB BLD-MCNC: 11.4 G/DL (ref 12–17)
MAGNESIUM SERPL-MCNC: 2.1 MG/DL (ref 1.9–2.7)
MCH RBC QN AUTO: 29.5 PG (ref 26.8–34.3)
MCHC RBC AUTO-ENTMCNC: 31.5 G/DL (ref 31.4–37.4)
MCV RBC AUTO: 94 FL (ref 82–98)
PHOSPHATE SERPL-MCNC: 3.2 MG/DL (ref 2.7–4.5)
PLATELET # BLD AUTO: 208 THOUSANDS/UL (ref 149–390)
PMV BLD AUTO: 10.2 FL (ref 8.9–12.7)
POTASSIUM SERPL-SCNC: 3.2 MMOL/L (ref 3.5–5.3)
PROT SERPL-MCNC: 7 G/DL (ref 6.4–8.4)
RBC # BLD AUTO: 3.87 MILLION/UL (ref 3.88–5.62)
SODIUM SERPL-SCNC: 139 MMOL/L (ref 135–147)
WBC # BLD AUTO: 7.2 THOUSAND/UL (ref 4.31–10.16)

## 2024-06-12 PROCEDURE — 83735 ASSAY OF MAGNESIUM: CPT

## 2024-06-12 PROCEDURE — 94003 VENT MGMT INPAT SUBQ DAY: CPT

## 2024-06-12 PROCEDURE — 80053 COMPREHEN METABOLIC PANEL: CPT

## 2024-06-12 PROCEDURE — 99232 SBSQ HOSP IP/OBS MODERATE 35: CPT | Performed by: PSYCHIATRY & NEUROLOGY

## 2024-06-12 PROCEDURE — 94640 AIRWAY INHALATION TREATMENT: CPT

## 2024-06-12 PROCEDURE — 99291 CRITICAL CARE FIRST HOUR: CPT | Performed by: INTERNAL MEDICINE

## 2024-06-12 PROCEDURE — 71045 X-RAY EXAM CHEST 1 VIEW: CPT

## 2024-06-12 PROCEDURE — 94760 N-INVAS EAR/PLS OXIMETRY 1: CPT

## 2024-06-12 PROCEDURE — 84100 ASSAY OF PHOSPHORUS: CPT

## 2024-06-12 PROCEDURE — 85027 COMPLETE CBC AUTOMATED: CPT

## 2024-06-12 PROCEDURE — 94664 DEMO&/EVAL PT USE INHALER: CPT

## 2024-06-12 PROCEDURE — 94669 MECHANICAL CHEST WALL OSCILL: CPT

## 2024-06-12 PROCEDURE — 31622 DX BRONCHOSCOPE/WASH: CPT | Performed by: INTERNAL MEDICINE

## 2024-06-12 PROCEDURE — 93005 ELECTROCARDIOGRAM TRACING: CPT

## 2024-06-12 PROCEDURE — 82330 ASSAY OF CALCIUM: CPT

## 2024-06-12 RX ORDER — POTASSIUM CHLORIDE 20MEQ/15ML
40 LIQUID (ML) ORAL ONCE
Status: COMPLETED | OUTPATIENT
Start: 2024-06-12 | End: 2024-06-12

## 2024-06-12 RX ORDER — PROPOFOL 10 MG/ML
100 INJECTION, EMULSION INTRAVENOUS ONCE
Status: DISCONTINUED | OUTPATIENT
Start: 2024-06-12 | End: 2024-06-14

## 2024-06-12 RX ORDER — MIDAZOLAM HYDROCHLORIDE 2 MG/2ML
4 INJECTION, SOLUTION INTRAMUSCULAR; INTRAVENOUS ONCE
Status: COMPLETED | OUTPATIENT
Start: 2024-06-12 | End: 2024-06-12

## 2024-06-12 RX ADMIN — SODIUM CHLORIDE SOLN NEBU 3% 4 ML: 3 NEBU SOLN at 13:29

## 2024-06-12 RX ADMIN — FLUOXETINE HYDROCHLORIDE 10 MG: 10 CAPSULE ORAL at 10:13

## 2024-06-12 RX ADMIN — CEFEPIME 2000 MG: 2 INJECTION, POWDER, FOR SOLUTION INTRAVENOUS at 05:26

## 2024-06-12 RX ADMIN — CHLORHEXIDINE GLUCONATE 0.12% ORAL RINSE 15 ML: 1.2 LIQUID ORAL at 09:19

## 2024-06-12 RX ADMIN — APIXABAN 5 MG: 5 TABLET, FILM COATED ORAL at 09:19

## 2024-06-12 RX ADMIN — SCOPOLAMINE 1 PATCH: 1 SYSTEM TRANSDERMAL at 10:10

## 2024-06-12 RX ADMIN — SODIUM CHLORIDE SOLN NEBU 3% 4 ML: 3 NEBU SOLN at 19:19

## 2024-06-12 RX ADMIN — FENTANYL CITRATE 100 MCG: 50 INJECTION INTRAMUSCULAR; INTRAVENOUS at 17:10

## 2024-06-12 RX ADMIN — LEVALBUTEROL HYDROCHLORIDE 1.25 MG: 1.25 SOLUTION RESPIRATORY (INHALATION) at 07:30

## 2024-06-12 RX ADMIN — LEVALBUTEROL HYDROCHLORIDE 1.25 MG: 1.25 SOLUTION RESPIRATORY (INHALATION) at 13:29

## 2024-06-12 RX ADMIN — APIXABAN 5 MG: 5 TABLET, FILM COATED ORAL at 17:13

## 2024-06-12 RX ADMIN — GUAIFENESIN 200 MG: 100 SOLUTION ORAL at 21:44

## 2024-06-12 RX ADMIN — CHLORHEXIDINE GLUCONATE 0.12% ORAL RINSE 15 ML: 1.2 LIQUID ORAL at 21:44

## 2024-06-12 RX ADMIN — LEVALBUTEROL HYDROCHLORIDE 1.25 MG: 1.25 SOLUTION RESPIRATORY (INHALATION) at 19:18

## 2024-06-12 RX ADMIN — CEFEPIME 2000 MG: 2 INJECTION, POWDER, FOR SOLUTION INTRAVENOUS at 22:47

## 2024-06-12 RX ADMIN — MICONAZOLE NITRATE: 20 CREAM TOPICAL at 17:13

## 2024-06-12 RX ADMIN — SODIUM CHLORIDE SOLN NEBU 3% 4 ML: 3 NEBU SOLN at 01:57

## 2024-06-12 RX ADMIN — POTASSIUM CHLORIDE 40 MEQ: 1.5 SOLUTION ORAL at 09:19

## 2024-06-12 RX ADMIN — GUAIFENESIN 200 MG: 100 SOLUTION ORAL at 09:19

## 2024-06-12 RX ADMIN — CEFEPIME 2000 MG: 2 INJECTION, POWDER, FOR SOLUTION INTRAVENOUS at 16:02

## 2024-06-12 RX ADMIN — SODIUM CHLORIDE SOLN NEBU 3% 4 ML: 3 NEBU SOLN at 07:30

## 2024-06-12 RX ADMIN — LEVALBUTEROL HYDROCHLORIDE 1.25 MG: 1.25 SOLUTION RESPIRATORY (INHALATION) at 01:57

## 2024-06-12 RX ADMIN — MICONAZOLE NITRATE: 20 CREAM TOPICAL at 09:19

## 2024-06-12 RX ADMIN — GUAIFENESIN 200 MG: 100 SOLUTION ORAL at 16:07

## 2024-06-12 RX ADMIN — MIDAZOLAM 4 MG: 1 INJECTION INTRAMUSCULAR; INTRAVENOUS at 17:09

## 2024-06-12 RX ADMIN — GUAIFENESIN 200 MG: 100 SOLUTION ORAL at 03:29

## 2024-06-12 NOTE — RESPIRATORY THERAPY NOTE
"RT Protocol Note  Hemanth Swanson 39 y.o. male MRN: [id number]  [facility] En[facility]: [id number]    Assessment    Active Problems:    Umbilical hernia without obstruction and without gangrene    Depression    S/P percutaneous endoscopic gastrostomy (PEG) tube placement (HCC)    Status post tracheostomy (HCC)    Ventilator dependent (HCC)    Seizure-like activity (HCC)    Ventilator associated pneumonia (HCC)    Cardiac arrest due to other underlying condition (HCC)      Home Pulmonary Medications:  albuterol  Home Devices/Therapy: Ventilator    Past Medical History:   Diagnosis Date    Anxiety     Cancer (HCC)     Depression     Migration of percutaneous endoscopic gastrostomy (PEG) tube (HCC) 2023    Psychiatric disorder     bipolar     Social History     Socioeconomic History    Marital status: Single     Spouse name: Not on file    Number of children: Not on file    Years of education: Not on file    Highest education level: Not on file   Occupational History    Not on file   Tobacco Use    Smoking status: Former     Current packs/day: 0.00     Average packs/day: 0.5 packs/day for 15.4 years (7.7 ttl pk-yrs)     Types: Cigarettes     Start date: 2008     Quit date: 2023     Years since quittin.0    Smokeless tobacco: Never   Vaping Use    Vaping status: Former    Start date: 2018    Quit date: 2023    Substances: Nicotine, THC   Substance and Sexual Activity    Alcohol use: Not Currently     Comment: Former alcoholic. Last use in 2016    Drug use: Not Currently     Types: Marijuana, \"Crack\" cocaine     Comment: Current use medical marijuana. Not currently using crack cocaine.    Sexual activity: Not Currently     Partners: Female     Birth control/protection: Other   Other Topics Concern    Not on file   Social History Narrative    ** Merged History Encounter **          Social Determinants of Health     Financial Resource Strain: Low Risk  (2024)    Overall Financial " "Resource Strain (CARDIA)     Difficulty of Paying Living Expenses: Not hard at all   Food Insecurity: No Food Insecurity (6/4/2024)    Hunger Vital Sign     Worried About Running Out of Food in the Last Year: Never true     Ran Out of Food in the Last Year: Never true   Transportation Needs: No Transportation Needs (6/4/2024)    PRAPARE - Transportation     Lack of Transportation (Medical): No     Lack of Transportation (Non-Medical): No   Physical Activity: Not on file   Stress: Not on file   Social Connections: Not on file   Intimate Partner Violence: Not At Risk (11/10/2023)    Received from [facility], [facility]    Humiliation, Afraid, Rape, and Kick questionnaire     Fear of Current or Ex-Partner: No     Emotionally Abused: No     Physically Abused: No     Sexually Abused: No   Housing Stability: Low Risk  (6/4/2024)    Housing Stability Vital Sign     Unable to Pay for Housing in the Last Year: No     Number of Times Moved in the Last Year: 1     Homeless in the Last Year: No       Subjective         Objective    Physical Exam:   Assessment Type: Assess only  General Appearance: Awake  Respiratory Pattern: Assisted  Chest Assessment: Chest expansion symmetrical  Bilateral Breath Sounds: Diminished    Vitals:  Blood pressure 103/69, pulse 70, temperature 98.7 °F (37.1 °C), temperature source Oral, resp. rate 14, height 6' 4\" (1.93 m), weight 116 kg (256 lb 13.4 oz), SpO2 95%.    Results from last 7 days   Lab Units 06/10/24  1345   PH ART  7.426   PCO2 ART mm Hg 54.2*   PO2 ART mm Hg 102.5   HCO3 ART mmol/L 34.8*   BASE EXC ART mmol/L 8.7   O2 CONTENT ART mL/dL 18.5   O2 HGB, ARTERIAL % 96.8   ABG SOURCE  Radial, Right   MIGUELANGEL TEST  Yes       Imaging and other studies          Plan    Respiratory Plan: Vent/NIV/HFNC  Airway Clearance Plan: Percussive Vest     Resp Comments: (P) pt here for seizure like activity. Pt is vent dependent. Pt currently diminished breath sounds. Pt " takes albuterol 4x daily at home according to chart. Pt remains on vent at this time.

## 2024-06-12 NOTE — CASE MANAGEMENT
Case Management Discharge Planning Note    Patient name Hemanth Swanson  [id number]  : 1985 Date 2024       Current Admission Date: 6/10/2024  Current Admission Diagnosis:Umbilical hernia without obstruction and without gangrene   Patient Active Problem List    Diagnosis Date Noted Date Diagnosed    Ventilator associated pneumonia (HCC) 2024     Cardiac arrest due to other underlying condition ([facility]) 2024     Seizure-like activity ([facility]) 2024     Bradycardia 2024     Ventilator dependent ([facility]) 2024     Neuromuscular disorder ([facility]) 2024     Obesity hypoventilation syndrome ([facility]) 10/24/2023     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     Psychophysiological insomnia 10/18/2023     Impaired physical mobility 2023     Status post tracheostomy ([facility]) 2023     S/P percutaneous endoscopic gastrostomy (PEG) tube placement ([facility]) 2023     Anxiety 2023     Chronic respiratory failure with hypoxia and hypercapnia ([facility]) 2023     Sepsis ([facility]) 2023     Acute pulmonary embolism without acute cor pulmonale ([facility]) 2023     Depression 2023     Neuromuscular weakness ([facility]) 2023     History of LVH (left ventricular hypertrophy) 2023     Pure hypertriglyceridemia 2023     Unilateral vestibular schwannoma ([facility]) 2023     Port-A-Cath in place 2023     Diplopia 2023     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HCC) 2023     Umbilical hernia without obstruction and without gangrene 12/10/2022     Tobacco use 12/10/2022     Retroperitoneal lymphadenopathy 12/10/2022       LOS (days): 2  Geometric Mean LOS (GMLOS) (days):   Days to GMLOS:     OBJECTIVE:  Risk of Unplanned Readmission Score: 33.49         Current admission status: Inpatient   Preferred Pharmacy:   [facility]/pharmacy #[id number] [facility] - [address] [address]  Phone: [phone]  Fax: [phone]    Primary Care Provider: Ela Douglas MD    Primary Insurance: [facility]  Secondary Insurance:     DISCHARGE DETAILS:       Other Referral/Resources/Interventions Provided:  Referral Comments: Per chart revew: Neurology following - no seizure activity noted on EEG. Keppra dc'd. 1 episode of hypoxemia with SPO2 in 70s with suctioning and bradycardia. Vent dependent @ home.

## 2024-06-12 NOTE — PLAN OF CARE
Problem: Prexisting or High Potential for Compromised Skin Integrity  Goal: Skin integrity is maintained or improved  Description: INTERVENTIONS:  - Identify patients at risk for skin breakdown  - Assess and monitor skin integrity  - Assess and monitor nutrition and hydration status  - Monitor labs   - Assess for incontinence   - Turn and reposition patient  - Assist with mobility/ambulation  - Relieve pressure over bony prominences  - Avoid friction and shearing  - Provide appropriate hygiene as needed including keeping skin clean and dry  - Evaluate need for skin moisturizer/barrier cream  - Collaborate with interdisciplinary team   - Patient/family teaching  - Consider wound care consult   Outcome: Progressing     Problem: PAIN - ADULT  Goal: Verbalizes/displays adequate comfort level or baseline comfort level  Description: Interventions:  - Encourage patient to monitor pain and request assistance  - Assess pain using appropriate pain scale  - Administer analgesics based on type and severity of pain and evaluate response  - Implement non-pharmacological measures as appropriate and evaluate response  - Consider cultural and social influences on pain and pain management  - Notify physician/advanced practitioner if interventions unsuccessful or patient reports new pain  Outcome: Progressing     Problem: INFECTION - ADULT  Goal: Absence or prevention of progression during hospitalization  Description: INTERVENTIONS:  - Assess and monitor for signs and symptoms of infection  - Monitor lab/diagnostic results  - Monitor all insertion sites, i.e. indwelling lines, tubes, and drains  - Monitor endotracheal if appropriate and nasal secretions for changes in amount and color  - [place] appropriate cooling/warming therapies per order  - Administer medications as ordered  - Instruct and encourage patient and family to use good hand hygiene technique  - Identify and instruct in appropriate isolation precautions for  identified infection/condition  Outcome: Progressing  Goal: Absence of fever/infection during neutropenic period  Description: INTERVENTIONS:  - Monitor WBC    Outcome: Progressing     Problem: SAFETY ADULT  Goal: Patient will remain free of falls  Description: INTERVENTIONS:  - Educate patient/family on patient safety including physical limitations  - Instruct patient to call for assistance with activity   - Consult OT/PT to assist with strengthening/mobility   - Keep Call bell within reach  - Keep bed low and locked with side rails adjusted as appropriate  - Keep care items and personal belongings within reach  - Initiate and maintain comfort rounds  - Make Fall Risk Sign visible to staff  - Offer Toileting every - Hours, in advance of need  - Initiate/Maintain -alarm  - Obtain necessary fall risk management equipment: -  - Apply yellow socks and bracelet for high fall risk patients  - Consider moving patient to room near nurses station  Outcome: Progressing  Goal: Maintain or return to baseline ADL function  Description: INTERVENTIONS:  -  Assess patient's ability to carry out ADLs; assess patient's baseline for ADL function and identify physical deficits which impact ability to perform ADLs (bathing, care of mouth/teeth, toileting, grooming, dressing, etc.)  - Assess/evaluate cause of self-care deficits   - Assess range of motion  - Assess patient's mobility; develop plan if impaired  - Assess patient's need for assistive devices and provide as appropriate  - Encourage maximum independence but intervene and supervise when necessary  - Involve family in performance of ADLs  - Assess for home care needs following discharge   - Consider OT consult to assist with ADL evaluation and planning for discharge  - Provide patient education as appropriate  Outcome: Progressing  Goal: Maintains/Returns to pre admission functional level  Description: INTERVENTIONS:  - Perform AM-PAC 6 Click Basic Mobility/ Daily Activity  assessment daily.  - Set and communicate daily mobility goal to care team and patient/family/caregiver.   - Collaborate with rehabilitation services on mobility goals if consulted  - Perform Range of Motion - times a day.  - Reposition patient every - hours.  - Dangle patient - times a day  - Stand patient - times a day  - Ambulate patient - times a day  - Out of bed to chair - times a day   - Out of bed for meals --- times a day  - Out of bed for toileting  - Record patient progress and toleration of activity level   Outcome: Progressing     Problem: DISCHARGE PLANNING  Goal: Discharge to home or other facility with appropriate resources  Description: INTERVENTIONS:  - Identify barriers to discharge w/patient and caregiver  - Arrange for needed discharge resources and transportation as appropriate  - Identify discharge learning needs (meds, wound care, etc.)  - Arrange for interpretive services to assist at discharge as needed  - Refer to Case Management Department for coordinating discharge planning if the patient needs post-hospital services based on physician/advanced practitioner order or complex needs related to functional status, cognitive ability, or social support system  Outcome: Progressing     Problem: Knowledge Deficit  Goal: Patient/family/caregiver demonstrates understanding of disease process, treatment plan, medications, and discharge instructions  Description: Complete learning assessment and assess knowledge base.  Interventions:  - Provide teaching at level of understanding  - Provide teaching via preferred learning methods  Outcome: Progressing     Problem: Nutrition/Hydration-ADULT  Goal: Nutrient/Hydration intake appropriate for improving, restoring or maintaining nutritional needs  Description: Monitor and assess patient's nutrition/hydration status for malnutrition. Collaborate with interdisciplinary team and initiate plan and interventions as ordered.  Monitor patient's weight and dietary  intake as ordered or per policy. Utilize nutrition screening tool and intervene as necessary. Determine patient's food preferences and provide high-protein, high-caloric foods as appropriate.     INTERVENTIONS:  - Monitor oral intake, urinary output, labs, and treatment plans  - Assess nutrition and hydration status and recommend course of action  - Evaluate amount of meals eaten  - Assist patient with eating if necessary   - Allow adequate time for meals  - Recommend/ encourage appropriate diets, oral nutritional supplements, and vitamin/mineral supplements  - Order, calculate, and assess calorie counts as needed  - Recommend, monitor, and adjust tube feedings and TPN/PPN based on assessed needs  - Assess need for intravenous fluids  - Provide specific nutrition/hydration education as appropriate  - Include patient/family/caregiver in decisions related to nutrition  Outcome: Progressing

## 2024-06-12 NOTE — PROGRESS NOTES
Progress Note - Neurology   Hemanth Swanson 39 y.o. male MRN: [id number]  Unit/Bed#: [facility] 05 Encounter: [id number]      Assessment & Plan     Seizure-like activity (HCC)  Assessment & Plan  40yo male with history of metastatic testicular seminoma S/P chemotherapy, hypertension, bipolar depression, PE on Eliquis    Admitted in August/September 2023 for somnolence/AMS and acute hypoxic respiratory failure/vent dependence with trach (9/2023); had extensive neuro workup with MRI/LP/Video EEG (undiagnosed neuromuscular syndrome?)    Admitted since 5/30 with hypoxia/increased work of breathing.  Neurology consulted for multiple episodes of hypoxia and bradycardia, with few of the episodes with associated seizure-like activity. Routine EEG on 06/02/2024 was normal. It was suspected of reactive seizure in the setting of pneumonia rather than primary epilepsy.      6/9: Neuro asked to re-eval due to 3-4 episode of tonic shaking of the UE with eye rolling and altered for a few mins after. Overnight of 6/9, per ICU  nursing staff, patient had multiple episodes of brooke/hypoxia.     6/10: Around 4 AM, patient had an episode with bowel/bladder incontinence, foaming at the mouth and convulsions. Ativan 2mg PRN was given. Brought to [facility] for video EEG. Another event just before 6 PM    6/11: Few additional bouts of bradycardia/hypoxia overnight. Video EEG with mild diffuse slowing, no electrographic seizures on EEG.  No clinical seizure activity has been observed throughout duration of monitoring thus far. Tapering off Keppra    6/12: Now off Keppra, event at 5 AM of bradycardia/hypoxia with left arm rhythmic jerking (following commands during this episode); no EEG correlate/changes during this episode. EEG otherwise without seizure/epileptiform abnormalities, thus will discontinue    Plan:  -See above, will discontinue video EEG this AM  -Now off Keppra, will continue off given unremarkable video EEG  -Recommend MRI brain seizure  "protocol and IAC with and without (had prior bilateral IAC enhancement). Prior MRI done on 11/2023 at [facility] was reported motion degraded.  -Consider switching from Cefepime to a different antibiotic, as Cefepime can lower seizure threshold (has now been on cefepime for approx. 1 week)  -Seizure precautions  -Rest of medical management per critical care  -On Eliquis given PE history      Will further discuss plan of care with attending neurologist.    Recommendations for outpatient neurological follow up have yet to be determined.    Subjective:   Patient resting in bed, remains intubated on vent.  Video EEG ongoing.  Neuro exam arise remains easily arousable and following commands/answering questions by shaking his head.  Per critical care had additional event of hypoxia/bradycardia overnight at 5 AM with reported rhythmic left arm movement at that time.  However during this activity, it was noted patient was still following commands.    Vitals: Blood pressure 99/59, pulse 71, temperature 98.4 °F (36.9 °C), temperature source Oral, resp. rate 13, height 6' 4\" (1.93 m), weight 116 kg (254 lb 13.6 oz), SpO2 98%.,Body mass index is 31.02 kg/m².    Physical Exam:  Physical Exam  HENT:      Head: Normocephalic and atraumatic.   Eyes:      Extraocular Movements: Extraocular movements intact.      Conjunctiva/sclera: Conjunctivae normal.      Pupils: Pupils are equal, round, and reactive to light.   Cardiovascular:      Rate and Rhythm: Normal rate.   Pulmonary:      Comments: Intubated on vent  Abdominal:      General: There is no distension.   Musculoskeletal:         General: Normal range of motion.      Cervical back: Normal range of motion and neck supple.   Skin:     General: Skin is warm and dry.   Neurological:      Mental Status: He is alert.       Neurologic Exam     Mental Status   Patient opens eyes briskly to name calling.  Nonverbal but is able to communicate via shaking his head yes and no to questions.  " Follows central and appendicular commands correctly (sticking out tongue, showing thumbs into fingers, wiggling toes).     Cranial Nerves     CN III, IV, VI   Pupils are equal, round, and reactive to light.  Initially with vertical up-gaze/palsy, does track to either side of bed to examiner.      Motor Exam Provided  strength bilaterally as well as bicep and tricep activation, antigravity leg raise and toe wiggling.      Sensory Exam   Notes diminished pinprick in the L UE and bilateral LE, not definitively on the R UE.      Gait, Coordination, and Reflexes   No clinical seizure activity observed throughout exam.  Suppressed/diminished DTRs throughout, no ankle clonus.       Lab, Imaging and other studies:   No new neuroimaging at time of evaluation  CBC:   Results from last 7 days   Lab Units 06/12/24  0444 06/11/24  0524 06/10/24  0454   WBC Thousand/uL 7.20 10.77* 11.47*   RBC Million/uL 3.87* 4.26 4.19   HEMOGLOBIN g/dL 11.4* 12.6 12.2   HEMATOCRIT % 36.2* 39.4 39.0   MCV fL 94 93 93   PLATELETS Thousands/uL 208 232 228   , BMP/CMP:   Results from last 7 days   Lab Units 06/12/24 0444 06/11/24 0524 06/10/24  0454   SODIUM mmol/L 139 136 136   POTASSIUM mmol/L 3.2* 3.9 3.9   CHLORIDE mmol/L 94* 93* 94*   CO2 mmol/L 36* 36* 34*   BUN mg/dL 16 18 19   CREATININE mg/dL 0.43* 0.54* 0.43*   CALCIUM mg/dL 9.1 9.6 9.3   AST U/L 18 18 20   ALT U/L 37 39 41   ALK PHOS U/L 128* 139* 115*   EGFR ml/min/1.73sq m 147 134 147   TSH:   Results from last 7 days   Lab Units 06/11/24  0524   TSH 3RD GENERATON uIU/mL 4.128    Ammonia:   Results from last 7 days   Lab Units 06/10/24  1338   AMMONIA umol/L 19     Results from last 7 days   Lab Units 06/11/24  0920   LEVETIRACETAM ug/mL 13.3     VTE Prophylaxis: Sequential compression device (Venodyne)  and Eliquis    Please see attendings attestation for total time spent/billing.  Discussed plan of care with patient and critical care: will discontinue video EEG, await MRI  brain.  Monitor off Keppra.    Please note dictation software was used in the formulation of this note.  Please keep that in mind in light of any grammatical errors.

## 2024-06-12 NOTE — RESPIRATORY THERAPY NOTE
RT Ventilator Management Note  Hemanth Swanson 39 y.o. male MRN: [id number]  Unit/Bed#: [facility] 05 Encounter: [id number]      Daily Screen         6/11/2024  1047 6/12/2024  0800          Patient safety screen outcome:: Failed Failed      Not Ready for Weaning due to:: -- PEEP > 8cmH2O                Physical Exam:   Assessment Type: Assess only  Respiratory Pattern: Assisted  Chest Assessment: Chest expansion symmetrical  Bilateral Breath Sounds: Diminished      Resp Comments: (P) pt resting no vent changes at this time. pt on 15 peep, no wean

## 2024-06-12 NOTE — RESPIRATORY THERAPY NOTE
RT Ventilator Management Note  Hemanth Swanson 39 y.o. male MRN: [id number]  Unit/Bed#: [facility] 05 Encounter: [id number]      Daily Screen         6/11/2024  0754 6/11/2024  1047          Patient safety screen outcome:: Failed Failed      Not Ready for Weaning due to:: Underline problem not resolved --                Physical Exam:   Assessment Type: Assess only  General Appearance: Awake  Respiratory Pattern: Assisted  Chest Assessment: Chest expansion symmetrical  Bilateral Breath Sounds: Diminished      Resp Comments: pt remained on listed settings through the night with no changes.     06/12/24 0310   Respiratory Assessment   Assessment Type Assess only   Respiratory Pattern Assisted   Chest Assessment Chest expansion symmetrical   Bilateral Breath Sounds Diminished   Resp Comments pt remained on listed settings through the night with no changes.   Vent Information   Vent ID [id number]   Vent type Hook C3   Hook Vent Mode (S)CMV   $ Vent Daily Charge-Subsequent Yes   $ Pulse Oximetry Spot Check Charge Completed   (S)CMV Settings   Resp Rate (BPM) 12 BPM   VT (mL) 500 mL   FIO2 (%) 40 %   PEEP (cmH2O) 15 cmH2O   I:E Ratio 1/4   Insp Time (%) 1 %   Flow Trigger (LPM) 3   Humidification Heater   Heater Temperature (Set) 87.8 °F (31 °C)   (S)CMV Actuals   Resp Rate (BPM) 14 BPM   VT (mL) 488   MV 6.4   MAP (cmH2O) 15 cmH2O   Peak Pressure (cmH2O) 35 cmH2O   I:E Ratio (Obs) 1/4   Insp Resistance 30   Heater Temperature (Obs) 87.8 °F (31 °C)   (S)CMV Alarms   High Peak Pressure (cmH2O) 50   Low Pressure (cmH2O) 8 cm H2O   High Resp Rate (BPM) 40 BPM   Low Resp Rate (BPM) 8 BPM   High MV (L/min) 16 L/min   Low MV (L/min) 4 L/min   High VT (mL) 1000 mL   Low VT (mL) 250 mL   Apnea Time (s) 20 S   Maintenance   Alarm (pink) cable attached No   Resuscitation bag with peep valve at bedside Yes   Water bag changed No   Circuit changed No   IHI Ventilator Associated Pneumonia Bundle   Head of Bed Elevated HOB 30   Surgical  Airway Distal;Cuffed;Other (Comment)   Placement Date/Time: 06/01/24 1155   Tube Size: 6  Placed By: Physician  Placed by (Name): Dr. Ronquillo  Type: Tracheostomy  Style: (c) Distal;Cuffed;Other (Comment)   Status Cuff Inflated;Secured   Surgical Airway Cuff Pressure (color) Green   Equipment at bedside BVM;Wall Suction setup;Additional complete same size trach tube;Sterile saline;Additional inner cannula        06/12/24 0310   Respiratory Assessment   Assessment Type Assess only   Respiratory Pattern Assisted   Chest Assessment Chest expansion symmetrical   Bilateral Breath Sounds Diminished   Resp Comments pt remained on listed settings through the night with no changes.   Vent Information   Vent ID [id number]   Vent type Hook C3   Hook Vent Mode (S)CMV   $ Vent Daily Charge-Subsequent Yes   $ Pulse Oximetry Spot Check Charge Completed   (S)CMV Settings   Resp Rate (BPM) 12 BPM   VT (mL) 500 mL   FIO2 (%) 40 %   PEEP (cmH2O) 15 cmH2O   I:E Ratio 1/4   Insp Time (%) 1 %   Flow Trigger (LPM) 3   Humidification Heater   Heater Temperature (Set) 87.8 °F (31 °C)   (S)CMV Actuals   Resp Rate (BPM) 14 BPM   VT (mL) 488   MV 6.4   MAP (cmH2O) 15 cmH2O   Peak Pressure (cmH2O) 35 cmH2O   I:E Ratio (Obs) 1/4   Insp Resistance 30   Heater Temperature (Obs) 87.8 °F (31 °C)   (S)CMV Alarms   High Peak Pressure (cmH2O) 50   Low Pressure (cmH2O) 8 cm H2O   High Resp Rate (BPM) 40 BPM   Low Resp Rate (BPM) 8 BPM   High MV (L/min) 16 L/min   Low MV (L/min) 4 L/min   High VT (mL) 1000 mL   Low VT (mL) 250 mL   Apnea Time (s) 20 S   Maintenance   Alarm (pink) cable attached No   Resuscitation bag with peep valve at bedside Yes   Water bag changed No   Circuit changed No   IHI Ventilator Associated Pneumonia Bundle   Head of Bed Elevated HOB 30   Surgical Airway Distal;Cuffed;Other (Comment)   Placement Date/Time: 06/01/24 1155   Tube Size: 6  Placed By: Physician  Placed by (Name): Dr. Ronquillo  Type: Tracheostomy  Style:  (c) Distal;Cuffed;Other (Comment)   Status Cuff Inflated;Secured   Surgical Airway Cuff Pressure (color) Green   Equipment at bedside BVM;Wall Suction setup;Additional complete same size trach tube;Sterile saline;Additional inner cannula

## 2024-06-12 NOTE — QUICK NOTE
Called patient sister and discussed updates. All questions and concerns addressed. Possible bronch today, EEG discontinued no seizure like activity observed.    Shara Samano M.D.  [facility] PGY1  6/12/2024, 11:54 AM

## 2024-06-12 NOTE — PLAN OF CARE
Problem: PAIN - ADULT  Goal: Verbalizes/displays adequate comfort level or baseline comfort level  Description: Interventions:  - Encourage patient to monitor pain and request assistance  - Assess pain using appropriate pain scale  - Administer analgesics based on type and severity of pain and evaluate response  - Implement non-pharmacological measures as appropriate and evaluate response  - Consider cultural and social influences on pain and pain management  - Notify physician/advanced practitioner if interventions unsuccessful or patient reports new pain  Outcome: Progressing     Problem: SAFETY ADULT  Goal: Patient will remain free of falls  Description: INTERVENTIONS:  - Educate patient/family on patient safety including physical limitations  - Instruct patient to call for assistance with activity   - Consult OT/PT to assist with strengthening/mobility   - Keep Call bell within reach  - Keep bed low and locked with side rails adjusted as appropriate  - Keep care items and personal belongings within reach  - Initiate and maintain comfort rounds  - Make Fall Risk Sign visible to staff  - Apply yellow socks and bracelet for high fall risk patients  - Consider moving patient to room near nurses station  Outcome: Progressing  Goal: Maintain or return to baseline ADL function  Description: INTERVENTIONS:  -  Assess patient's ability to carry out ADLs; assess patient's baseline for ADL function and identify physical deficits which impact ability to perform ADLs (bathing, care of mouth/teeth, toileting, grooming, dressing, etc.)  - Assess/evaluate cause of self-care deficits   - Assess range of motion  - Assess patient's mobility; develop plan if impaired  - Assess patient's need for assistive devices and provide as appropriate  - Encourage maximum independence but intervene and supervise when necessary  - Involve family in performance of ADLs  - Assess for home care needs following discharge   - Consider OT consult  to assist with ADL evaluation and planning for discharge  - Provide patient education as appropriate  Outcome: Progressing  Goal: Maintains/Returns to pre admission functional level  Description: INTERVENTIONS:  - Perform AM-PAC 6 Click Basic Mobility/ Daily Activity assessment daily.  - Set and communicate daily mobility goal to care team and patient/family/caregiver.   - Collaborate with rehabilitation services on mobility goals if consulted  Problem: Nutrition/Hydration-ADULT  Goal: Nutrient/Hydration intake appropriate for improving, restoring or maintaining nutritional needs  Description: Monitor and assess patient's nutrition/hydration status for malnutrition. Collaborate with interdisciplinary team and initiate plan and interventions as ordered.  Monitor patient's weight and dietary intake as ordered or per policy. Utilize nutrition screening tool and intervene as necessary. Determine patient's food preferences and provide high-protein, high-caloric foods as appropriate.     INTERVENTIONS:  - Monitor oral intake, urinary output, labs, and treatment plans  - Assess nutrition and hydration status and recommend course of action  - Evaluate amount of meals eaten  - Assist patient with eating if necessary   - Allow adequate time for meals  - Recommend/ encourage appropriate diets, oral nutritional supplements, and vitamin/mineral supplements  - Order, calculate, and assess calorie counts as needed  - Recommend, monitor, and adjust tube feedings and TPN/PPN based on assessed needs  - Assess need for intravenous fluids  - Provide specific nutrition/hydration education as appropriate  - Include patient/family/caregiver in decisions related to nutrition  Outcome: Progressing     - Record patient progress and toleration of activity level   Outcome: Progressing

## 2024-06-12 NOTE — PROGRESS NOTES
[facility]  Progress Note: Critical Care  Name: Hemanth Swanson 39 y.o. male I MRN: [id number]  Unit/Bed#: MICU 05 I Date of Admission: 6/10/2024   Date of Service: 6/12/2024 I Hospital Day: 2    Assessment & Plan   Seizure-like activity  S/p cardiac arrest  Ventilator associated pneumonia  Neuromuscular disorder  Testicular seminoma, s/p ochiectomy/chemo  Trach and peg dependence  CROW        Neuro:   Diagnosis: Seizure-like activity  Continuous EEG, consider DC this am  Brain MRI following EEG (EEG not MRI compatible)  Keppra decreased to 375 mg last night, plan to DC today  Ativan 2 mg PRN seizure-like activity > 3 min   Neurology following appreciate recs  Patient had one episode of hypoxia with bradycardia overnight at approximately 5 am  Diagnosis: Neuromuscular disorder  Patient has completed both outpatient and inpatient workup without any definitve diagnosis  Possible  seronegative myasthenia variation due to chemo vs anoxic brain injury  No oral diet for now  Neurology following, appreciate recommendations  Diagnosis: Altered mental status   In the setting of infection with seizure-like activity   Plan: delirium precautions  Monitor oxygen saturation  Maintain sleep wake cycle  Lactic and ammonia obtained on arrival wnl     CV:   Diagnosis: Bradycardia  Plan: Patient continues with episodes of bradycardia  Cardiac monitoring  Atropine prn  Lactic and ammonia obtained on arrival wnl  Diagnosis: s/p cardiac arrest with ROSC  Patient had episode of pulseless asystole without hypoxia, ROSC obtained after 2 rounds ACLS with clearance of secretions on 5/31  Continuous cardiac monitoring  Frequent suctioning  Echo 6/11 showing EF of 65%  Diagnosis: HFpEF  Plan:   Echo this admission showing EF 65%  Echo in 8/23 showing EF 60%  Daily weights  Strict Is and Os        Pulm:  Diagnosis: Ventilator associated multifocal pneumonia  Plan: Home Trilogy ventilator settings AC/VC TV  500/RR 18/Peep 6 + 6 L O2 into ventilator. Uses albuterol q6 and mucomyst q6  Xopenex and hypertonic saline nebs  Cxr yesterday showed hypoinflation, peep increased to 15  Hypoinflation seen on yesterdays cxr appears improved on cxr this am  1 episode of hypoxia and bradycardia in past 24 hours  Frequent suctioning  Chest PT  Possible bronchoscopy today  Intermittent episodes of hypoxia  ABG on admission showing elevated Pco2 at 54.2, normal PO2 at 102.5, bicarb elevated at 34.8  One episode hypoxia in past 24 hours since increased peep to 15  Repeat cxr this am  Possible bronch today  Diagnosis: History pulmonary embolism  Hospitalized for right lower lobe segmental PE in August 2023, on eliquis since discharge  Continue eliquis     GI:   No acute issues, continue tube feeds     :   No acute issues  Limited urine output data, external urinary catheter does not fit properly        F/E/N:   F: none  E: monitor and replete as needed  N: tube feeds: Jevity 1.5 continuous 70 cc/hr w/ 200 cc free water flushes q4h (holding tube feeds for possible bronch)        Heme/Onc:   Leukocytosis in the setting of multifocal pneumonia  Continue to monitor WBC count on antibiotics  Hgb mildly decrease this am 11.4 from 12.6 yesterday  No active signs of bleeding   Continue to monitor  Platelets stable        Endo:   No acute problems  TSH and am cortisol wnl        ID:   Diagnosis: Ventilator associated multifocal pneumonia  Plan: Pseudomonas and serretia grown from bronchial washings  Patient now on cefepime, plan to complete today of 10 day course of antibiotics (through 6/12)    Disposition: Critical care    ICU Core Measures     Vented Patient  VAP Bundle  VAP bundle ordered     A: Assess, Prevent, and Manage Pain Has pain been assessed? Yes  Need for changes to pain regimen? No   B: Both Spontaneous Awakening Trials (SATs) and Spontaneous Breathing Trials (SBTs) Plan to perform spontaneous awakening trial today? N/A   Plan to  perform spontaneous breathing trial today? N/A   Obvious barriers to extubation? Yes   C: Choice of Sedation RASS Goal: 0 Alert and Calm  Need for changes to sedation or analgesia regimen? No   D: Delirium CAM-ICU: Negative   E: Early Mobility  Plan for early mobility? Yes   F: Family Engagement Plan for family engagement today? Yes       Antibiotic Review: Antibiotics to be discontinued today    Review of Invasive Devices:      Central access plan:  Patient has peripheral Ivs and no central lines      Prophylaxis:  VTE VTE covered by:  apixaban, Per PEG Tube, 5 mg at 06/11/24 1729       Stress Ulcer  not ordered        Significant 24hr Events     24hr events: Patient had one episode of hypoxia and bradycardia overnight around 5 am. Rhythmic jerking of left arm noted at the time however patient was able to follow commands during this time period. Patient see and examined at bedside this am. He nods his head and shakes head in response to questions, able to mouth words and communicates appropriately.      Subjective          Objective                            Vitals I/O      Most Recent Min/Max in 24hrs   Temp 98.4 °F (36.9 °C) Temp  Min: 98.1 °F (36.7 °C)  Max: 98.9 °F (37.2 °C)   Pulse 69 Pulse  Min: 54  Max: 83   Resp 12 Resp  Min: 12  Max: 42   /56 BP  Min: 89/59  Max: 130/69   O2 Sat 97 % SpO2  Min: 79 %  Max: 100 %      Intake/Output Summary (Last 24 hours) at 6/12/2024 0644  Last data filed at 6/12/2024 0601  Gross per 24 hour   Intake 2615 ml   Output 200 ml   Net 2415 ml       Diet Enteral/Parenteral; Tube Feeding No Oral Diet; Jevity 1.5; Continuous; 70; 200; Water; Every 4 hours    Invasive Monitoring           Physical Exam   Physical Exam  Eyes:      Extraocular Movements: Extraocular movements intact.   Skin:     General: Skin is warm and dry.   HENT:      Head: Normocephalic.      Mouth/Throat:      Mouth: Mucous membranes are moist.   Neck:      Trachea: Tracheostomy present.   Cardiovascular:       Rate and Rhythm: Normal rate and regular rhythm.   Musculoskeletal:         General: Normal range of motion.      Right lower leg: No edema.      Left lower leg: No edema.   Abdominal: General: Bowel sounds are normal.      Palpations: Abdomen is soft.   Constitutional:       General: He is not in acute distress.     Appearance: He is well-developed.      Interventions: He is intubated. He is not restrained.  Pulmonary:      Effort: Pulmonary effort is normal. He is intubated.      Breath sounds: No wheezing, rhonchi or rales.   Neurological:      General: No focal deficit present.      Mental Status: He is alert. Mental status is at baseline. He is calm.   Genitourinary/Anorectal:     Rectum: Rectal tube present.            Diagnostic Studies      EKG: no new  Imaging: cxr this am appears more inflated with improvement of atelectasis per my read I have personally reviewed pertinent reports.       Medications:  Scheduled PRN   apixaban, 5 mg, BID  cefepime, 2,000 mg, Q8H  chlorhexidine, 15 mL, Q12H ISAEL  fentaNYL, 200 mcg, Once  FLUoxetine, 10 mg, Daily  guaiFENesin, 200 mg, Q6H  levalbuterol, 1.25 mg, Q6H  midazolam, 4 mg, Once  JANES ANTIFUNGAL, , BID  potassium chloride, 40 mEq, Once  propofol, 100 mg, Once  scopolamine, 1 patch, Q72H  sodium chloride, 4 mL, Q6H      acetaminophen, 650 mg, Q8H PRN  atropine, 0.5 mg, Once PRN  LORazepam, 2 mg, Once PRN       Continuous          Labs:    CBC    Recent Labs     06/11/24  0524 06/12/24  0444   WBC 10.77* 7.20   HGB 12.6 11.4*   HCT 39.4 36.2*    208     BMP    Recent Labs     06/11/24  0524 06/12/24  0444   SODIUM 136 139   K 3.9 3.2*   CL 93* 94*   CO2 36* 36*   AGAP 7 9   BUN 18 16   CREATININE 0.54* 0.43*   CALCIUM 9.6 9.1       Coags    No recent results     Additional Electrolytes  Recent Labs     06/11/24  0524 06/12/24  0444   MG 2.3 2.1   PHOS 4.2 3.2   CAIONIZED 1.19 1.15          Blood Gas    Recent Labs     06/10/24  1345   PHART 7.426   EHZ3SDG  54.2*   PO2ART 102.5   YKN1ASP 34.8*   BEART 8.7   SOURCE Radial, Right     Recent Labs     06/10/24  1345   SOURCE Radial, Right    LFTs  Recent Labs     06/11/24  0524 06/12/24  0444   ALT 39 37   AST 18 18   ALKPHOS 139* 128*   ALB 4.0 3.7   TBILI 0.77 0.53       Infectious  No recent results  Glucose  Recent Labs     06/11/24  0524 06/12/24  0444   GLUC 95 108               Shara Samano MD

## 2024-06-13 LAB
ALBUMIN SERPL BCG-MCNC: 3.7 G/DL (ref 3.5–5)
ALP SERPL-CCNC: 138 U/L (ref 34–104)
ALT SERPL W P-5'-P-CCNC: 38 U/L (ref 7–52)
ANION GAP SERPL CALCULATED.3IONS-SCNC: 7 MMOL/L (ref 4–13)
AST SERPL W P-5'-P-CCNC: 19 U/L (ref 13–39)
ATRIAL RATE: 83 BPM
BILIRUB SERPL-MCNC: 0.45 MG/DL (ref 0.2–1)
BUN SERPL-MCNC: 14 MG/DL (ref 5–25)
CA-I BLD-SCNC: 1.14 MMOL/L (ref 1.12–1.32)
CALCIUM SERPL-MCNC: 9.1 MG/DL (ref 8.4–10.2)
CHLORIDE SERPL-SCNC: 95 MMOL/L (ref 96–108)
CO2 SERPL-SCNC: 36 MMOL/L (ref 21–32)
CREAT SERPL-MCNC: 0.43 MG/DL (ref 0.6–1.3)
ERYTHROCYTE [DISTWIDTH] IN BLOOD BY AUTOMATED COUNT: 14.9 % (ref 11.6–15.1)
GFR SERPL CREATININE-BSD FRML MDRD: 147 ML/MIN/1.73SQ M
GLUCOSE SERPL-MCNC: 107 MG/DL (ref 65–140)
HCT VFR BLD AUTO: 37 % (ref 36.5–49.3)
HGB BLD-MCNC: 11.4 G/DL (ref 12–17)
MAGNESIUM SERPL-MCNC: 2 MG/DL (ref 1.9–2.7)
MCH RBC QN AUTO: 29.2 PG (ref 26.8–34.3)
MCHC RBC AUTO-ENTMCNC: 30.8 G/DL (ref 31.4–37.4)
MCV RBC AUTO: 95 FL (ref 82–98)
P AXIS: 24 DEGREES
PHOSPHATE SERPL-MCNC: 3.2 MG/DL (ref 2.7–4.5)
PLATELET # BLD AUTO: 216 THOUSANDS/UL (ref 149–390)
PMV BLD AUTO: 10.3 FL (ref 8.9–12.7)
POTASSIUM SERPL-SCNC: 3.8 MMOL/L (ref 3.5–5.3)
PR INTERVAL: 160 MS
PROT SERPL-MCNC: 7.2 G/DL (ref 6.4–8.4)
QRS AXIS: 85 DEGREES
QRSD INTERVAL: 84 MS
QT INTERVAL: 374 MS
QTC INTERVAL: 439 MS
RBC # BLD AUTO: 3.91 MILLION/UL (ref 3.88–5.62)
SODIUM SERPL-SCNC: 138 MMOL/L (ref 135–147)
T WAVE AXIS: 48 DEGREES
VENTRICULAR RATE: 83 BPM
WBC # BLD AUTO: 6.89 THOUSAND/UL (ref 4.31–10.16)

## 2024-06-13 PROCEDURE — 93010 ELECTROCARDIOGRAM REPORT: CPT | Performed by: INTERNAL MEDICINE

## 2024-06-13 PROCEDURE — 95720 EEG PHY/QHP EA INCR W/VEEG: CPT | Performed by: PSYCHIATRY & NEUROLOGY

## 2024-06-13 PROCEDURE — 84100 ASSAY OF PHOSPHORUS: CPT

## 2024-06-13 PROCEDURE — 94760 N-INVAS EAR/PLS OXIMETRY 1: CPT

## 2024-06-13 PROCEDURE — 94003 VENT MGMT INPAT SUBQ DAY: CPT

## 2024-06-13 PROCEDURE — 99291 CRITICAL CARE FIRST HOUR: CPT | Performed by: INTERNAL MEDICINE

## 2024-06-13 PROCEDURE — 94669 MECHANICAL CHEST WALL OSCILL: CPT

## 2024-06-13 PROCEDURE — 0BJ08ZZ INSPECTION OF TRACHEOBRONCHIAL TREE, VIA NATURAL OR ARTIFICIAL OPENING ENDOSCOPIC: ICD-10-PCS | Performed by: INTERNAL MEDICINE

## 2024-06-13 PROCEDURE — 83735 ASSAY OF MAGNESIUM: CPT

## 2024-06-13 PROCEDURE — 80053 COMPREHEN METABOLIC PANEL: CPT

## 2024-06-13 PROCEDURE — 99221 1ST HOSP IP/OBS SF/LOW 40: CPT | Performed by: OTOLARYNGOLOGY

## 2024-06-13 PROCEDURE — 82330 ASSAY OF CALCIUM: CPT

## 2024-06-13 PROCEDURE — 94668 MNPJ CHEST WALL SBSQ: CPT

## 2024-06-13 PROCEDURE — 94664 DEMO&/EVAL PT USE INHALER: CPT

## 2024-06-13 PROCEDURE — 85027 COMPLETE CBC AUTOMATED: CPT

## 2024-06-13 PROCEDURE — 94640 AIRWAY INHALATION TREATMENT: CPT

## 2024-06-13 PROCEDURE — 99254 IP/OBS CNSLTJ NEW/EST MOD 60: CPT | Performed by: SURGERY

## 2024-06-13 RX ORDER — FENTANYL CITRATE/PF 50 MCG/ML
SYRINGE (ML) INJECTION
Status: COMPLETED
Start: 2024-06-13 | End: 2024-06-13

## 2024-06-13 RX ORDER — NOREPINEPHRINE BITARTRATE 1 MG/ML
INJECTION, SOLUTION INTRAVENOUS
Status: DISPENSED
Start: 2024-06-13 | End: 2024-06-14

## 2024-06-13 RX ORDER — FENTANYL CITRATE 50 UG/ML
50 INJECTION, SOLUTION INTRAMUSCULAR; INTRAVENOUS ONCE
Status: COMPLETED | OUTPATIENT
Start: 2024-06-13 | End: 2024-06-13

## 2024-06-13 RX ADMIN — FENTANYL CITRATE 50 MCG: 50 INJECTION, SOLUTION INTRAMUSCULAR; INTRAVENOUS at 15:16

## 2024-06-13 RX ADMIN — SODIUM CHLORIDE SOLN NEBU 3% 4 ML: 3 NEBU SOLN at 19:24

## 2024-06-13 RX ADMIN — LEVALBUTEROL HYDROCHLORIDE 1.25 MG: 1.25 SOLUTION RESPIRATORY (INHALATION) at 01:30

## 2024-06-13 RX ADMIN — APIXABAN 5 MG: 5 TABLET, FILM COATED ORAL at 18:23

## 2024-06-13 RX ADMIN — GUAIFENESIN 200 MG: 100 SOLUTION ORAL at 03:21

## 2024-06-13 RX ADMIN — LEVALBUTEROL HYDROCHLORIDE 1.25 MG: 1.25 SOLUTION RESPIRATORY (INHALATION) at 19:24

## 2024-06-13 RX ADMIN — CHLORHEXIDINE GLUCONATE 0.12% ORAL RINSE 15 ML: 1.2 LIQUID ORAL at 21:44

## 2024-06-13 RX ADMIN — GUAIFENESIN 200 MG: 100 SOLUTION ORAL at 16:15

## 2024-06-13 RX ADMIN — CHLORHEXIDINE GLUCONATE 0.12% ORAL RINSE 15 ML: 1.2 LIQUID ORAL at 10:00

## 2024-06-13 RX ADMIN — FENTANYL CITRATE 50 MCG: 50 INJECTION INTRAMUSCULAR; INTRAVENOUS at 15:16

## 2024-06-13 RX ADMIN — GUAIFENESIN 200 MG: 100 SOLUTION ORAL at 10:08

## 2024-06-13 RX ADMIN — SODIUM CHLORIDE SOLN NEBU 3% 4 ML: 3 NEBU SOLN at 08:05

## 2024-06-13 RX ADMIN — GUAIFENESIN 200 MG: 100 SOLUTION ORAL at 21:44

## 2024-06-13 RX ADMIN — FLUOXETINE HYDROCHLORIDE 10 MG: 10 CAPSULE ORAL at 10:00

## 2024-06-13 RX ADMIN — MICONAZOLE NITRATE: 20 CREAM TOPICAL at 10:00

## 2024-06-13 RX ADMIN — SODIUM CHLORIDE SOLN NEBU 3% 4 ML: 3 NEBU SOLN at 13:51

## 2024-06-13 RX ADMIN — LEVALBUTEROL HYDROCHLORIDE 1.25 MG: 1.25 SOLUTION RESPIRATORY (INHALATION) at 08:05

## 2024-06-13 RX ADMIN — SODIUM CHLORIDE SOLN NEBU 3% 4 ML: 3 NEBU SOLN at 01:30

## 2024-06-13 RX ADMIN — APIXABAN 5 MG: 5 TABLET, FILM COATED ORAL at 10:00

## 2024-06-13 RX ADMIN — MICONAZOLE NITRATE: 20 CREAM TOPICAL at 18:23

## 2024-06-13 RX ADMIN — LEVALBUTEROL HYDROCHLORIDE 1.25 MG: 1.25 SOLUTION RESPIRATORY (INHALATION) at 13:51

## 2024-06-13 NOTE — PLAN OF CARE
Problem: PAIN - ADULT  Goal: Verbalizes/displays adequate comfort level or baseline comfort level  Description: Interventions:  - Encourage patient to monitor pain and request assistance  - Assess pain using appropriate pain scale  - Administer analgesics based on type and severity of pain and evaluate response  - Implement non-pharmacological measures as appropriate and evaluate response  - Consider cultural and social influences on pain and pain management  - Notify physician/advanced practitioner if interventions unsuccessful or patient reports new pain  Outcome: Progressing     Problem: SAFETY ADULT  Goal: Patient will remain free of falls  Description: INTERVENTIONS:  - Educate patient/family on patient safety including physical limitations  - Instruct patient to call for assistance with activity   - Consult OT/PT to assist with strengthening/mobility   - Keep Call bell within reach  - Keep bed low and locked with side rails adjusted as appropriate  - Keep care items and personal belongings within reach  - Initiate and maintain comfort rounds  - Make Fall Risk Sign visible to staff  - Apply yellow socks and bracelet for high fall risk patients  - Consider moving patient to room near nurses station  Outcome: Progressing  Goal: Maintain or return to baseline ADL function  Description: INTERVENTIONS:  -  Assess patient's ability to carry out ADLs; assess patient's baseline for ADL function and identify physical deficits which impact ability to perform ADLs (bathing, care of mouth/teeth, toileting, grooming, dressing, etc.)  - Assess/evaluate cause of self-care deficits   - Assess range of motion  - Assess patient's mobility; develop plan if impaired  - Assess patient's need for assistive devices and provide as appropriate  - Encourage maximum independence but intervene and supervise when necessary  - Involve family in performance of ADLs  - Assess for home care needs following discharge   - Consider OT consult  to assist with ADL evaluation and planning for discharge  - Provide patient education as appropriate  Outcome: Progressing    Problem: Nutrition/Hydration-ADULT  Goal: Nutrient/Hydration intake appropriate for improving, restoring or maintaining nutritional needs  Description: Monitor and assess patient's nutrition/hydration status for malnutrition. Collaborate with interdisciplinary team and initiate plan and interventions as ordered.  Monitor patient's weight and dietary intake as ordered or per policy. Utilize nutrition screening tool and intervene as necessary. Determine patient's food preferences and provide high-protein, high-caloric foods as appropriate.     INTERVENTIONS:  - Monitor oral intake, urinary output, labs, and treatment plans  - Assess nutrition and hydration status and recommend course of action  - Evaluate amount of meals eaten  - Assist patient with eating if necessary   - Allow adequate time for meals  - Recommend/ encourage appropriate diets, oral nutritional supplements, and vitamin/mineral supplements  - Order, calculate, and assess calorie counts as needed  - Recommend, monitor, and adjust tube feedings and TPN/PPN based on assessed needs  - Assess need for intravenous fluids  - Provide specific nutrition/hydration education as appropriate  - Include patient/family/caregiver in decisions related to nutrition  Outcome: Progressing

## 2024-06-13 NOTE — QUICK NOTE
Called patient sister sydney to discuss updates. Sister amenable to having trach upsized to size 8.  Sister states patient previously had size 8 in place and was not having any episodes of hypoxia and mucous plugging at this time.  Size 8 fell out and was replaced with a size 6 at [facility]. Family amenable for surgery consult for trach increase.     Shara Samano M.D.  [facility] Medicine PGY1  6/13/2024, 12:40 PM

## 2024-06-13 NOTE — CONSULTS
Acute Care Surgery  Consultation  1.  Acute hypoxic respiratory failure requiring tracheostomy  -Attempted bedside trach exchange from 6 to a size 8  -Patient unable to tolerate bedside exchange  -Would likely need a surgical intervention to complete upsize  -Recommending ENT consultation  -Surgery team will continue to follow as needed; Please reach out with any further questions or concerns    Disposition: Attempted bedside trach exchange; attempted to go from a 6 to a size 8.  Unable to perform at bedside.  Attending assisted with the exchange.  Will recommend ENT consultation for trach upsize.    History of Present Illness   HPI:  Hemanth Swanson is a 39 y.o. male who presents with evaluation for trach exchange.  Significant past medical history for anoxic brain injury 2023.  Patient currently with PEG tube dependence, hypertension, left testicular cancer status postorchiectomy chemo, PE on Eliquis, vent dependency.  Surgical team was consulted; as this 6 size trach has been in place since November 2023.  Currently at bedside with patient.  Attempted  to upsized to a size 8.  Patient did not tolerate bedside procedure.  Recommended to ENT consultation..    Review of Systems   Reason unable to perform ROS: Trach placed.       Historical Information   Past Medical History:   Diagnosis Date    Anxiety     Cancer (HCC)     Depression     Migration of percutaneous endoscopic gastrostomy (PEG) tube (HCC) 09/24/2023    Psychiatric disorder     bipolar     Past Surgical History:   Procedure Laterality Date    IR BIOPSY LYMPH NODE  01/20/2023    IR GASTROSTOMY TUBE PLACEMENT  09/25/2023    IR LUMBAR PUNCTURE  09/06/2023    IR PORT PLACEMENT  03/14/2023    ORCHIECTOMY Left 12/14/2022    Procedure: ORCHIECTOMY INGUINAL;  Surgeon: Flip Michael MD;  Location: [facility];  Service: Urology    PEG W/TRACHEOSTOMY PLACEMENT N/A 09/08/2023    Procedure: TRACHEOSTOMY WITH INSERTION PEG TUBE;  Surgeon: Rudy Mcmanus MD;   "Location: [facility];  Service: General    TESTICLE SURGERY       Social History   Social History     Substance and Sexual Activity   Alcohol Use Not Currently    Comment: Former alcoholic. Last use in 2016     Social History     Substance and Sexual Activity   Drug Use Not Currently    Types: Marijuana, \"Crack\" cocaine    Comment: Current use medical marijuana. Not currently using crack cocaine.     Social History     Tobacco Use   Smoking Status Former    Current packs/day: 0.00    Average packs/day: 0.5 packs/day for 15.4 years (7.7 ttl pk-yrs)    Types: Cigarettes    Start date: 2008    Quit date: 2023    Years since quittin.0   Smokeless Tobacco Never     Family History   Problem Relation Age of Onset    Coronary artery disease Maternal Aunt     Cancer Father     Diabetes Father     Hypertension Father        Meds/Allergies   PTA meds:   Prior to Admission Medications   Prescriptions Last Dose Informant Patient Reported? Taking?   FLUoxetine (PROzac) 10 mg capsule  Family Member No No   Si capsule (10 mg total) by Per G Tube route daily   Patient not taking: Reported on 2024   Incontinence Supply Disposable (Comfort Shield Adult Diapers) MISC  Family Member No No   Sig: Use 1 each 4 (four) times a day   Oral Hygiene Products (Toothette Swabs/Dentifrice) SWAB  Family Member No No   Sig: Apply to the mouth or throat 3 (three) times a day   Sennosides (senna) 8.8 mg/5 mL oral syrup  Family Member No No   Sig: 10 mL (17.6 mg total) by Per PEG Tube route daily as needed (Constipation)   Patient not taking: Reported on 2024   albuterol (2.5 mg/3 mL) 0.083 % nebulizer solution  Family Member Yes No   Sig: TAKE 3 ML (2.5 MG TOTAL) BY NEBULIZATION 4 (FOUR) TIMES A DAY.   apixaban (ELIQUIS) 5 mg  Family Member No No   Si tablet (5 mg total) by Per PEG Tube route 2 (two) times a day   Patient taking differently: 5 mg by Per PEG Tube route 2 (two) times a day Pt taking orally   hydrOXYzine " "(ATARAX) 10 mg/5 mL syrup  Family Member Yes No   Si-2 tsp QID prn anxiety PO   Patient not taking: Reported on 2024   oxygen gas  Family Member Yes No   Sig: Inhale 6 L/min as needed   polyethylene glycol (MIRALAX) 17 g packet  Family Member No No   Si g by Per PEG Tube route daily as needed (constipation)   Patient not taking: Reported on 2024   sodium chloride (BRONCHO SALINE) inhaler solution   Yes No   Sig: Take 1 spray by nebulization every 8 (eight) hours      Facility-Administered Medications: None     Allergies   Allergen Reactions    Dog Epithelium Cough, Sneezing and Nasal Congestion       Objective   First Vitals:   Blood Pressure: 123/80 (06/10/24 1400)  Pulse: 70 (06/10/24 1400)  Temperature: 98.7 °F (37.1 °C) (06/10/24 1400)  Temp Source: Axillary (06/10/24 1600)  Respirations: 13 (06/10/24 1400)  Height: 6' 4\" (193 cm) (06/10/24 2000)  Weight - Scale: 116 kg (256 lb 13.4 oz) (24 0537)  SpO2: 100 % (06/10/24 1400)    Current Vitals:   Blood Pressure: 130/72 (24 1000)  Pulse: 75 (24 1000)  Temperature: 98.4 °F (36.9 °C) (24 0800)  Temp Source: Oral (24 0800)  Respirations: 14 (24 1000)  Height: 6' 4\" (193 cm) (24 1536)  Weight - Scale: 115 kg (253 lb 8.5 oz) (24 0536)  SpO2: 99 % (24 1000)      Intake/Output Summary (Last 24 hours) at 2024 1702  Last data filed at 2024 0638  Gross per 24 hour   Intake 1120 ml   Output 265 ml   Net 855 ml       Invasive Devices       Central Venous Catheter Line  Duration             Port A Cath Right -- days    Port A Cath Right Subclavian -- days              Peripheral Intravenous Line  Duration             Peripheral IV 24 Proximal;Right;Ventral (anterior) Forearm 2 days              Drain  Duration             Gastrostomy/Enterostomy Percutaneous Interventional Gastrostomy 18 Fr.  days    Rectal Tube With balloon 2 days    External Urinary Catheter Other (Comment) 1 day "              Airway  Duration             Surgical Airway Distal;Cuffed;Other (Comment) 12 days                    Physical Exam  Vitals reviewed.   Constitutional:       Appearance: He is ill-appearing.   HENT:      Head: Normocephalic.   Neck:      Comments: Trach in place  Cardiovascular:      Rate and Rhythm: Normal rate and regular rhythm.      Pulses: Normal pulses.   Pulmonary:      Effort: No respiratory distress.      Breath sounds: Normal breath sounds.   Chest:      Chest wall: No tenderness.   Abdominal:      General: There is no distension.      Palpations: Abdomen is soft.      Tenderness: There is no abdominal tenderness.   Neurological:      Mental Status: He is alert.         Lab Results: I have personally reviewed pertinent lab results.  , CBC:   Lab Results   Component Value Date    WBC 6.89 06/13/2024    HGB 11.4 (L) 06/13/2024    HCT 37.0 06/13/2024    MCV 95 06/13/2024     06/13/2024    RBC 3.91 06/13/2024    MCH 29.2 06/13/2024    MCHC 30.8 (L) 06/13/2024    RDW 14.9 06/13/2024    MPV 10.3 06/13/2024   , CMP:   Lab Results   Component Value Date    SODIUM 138 06/13/2024    K 3.8 06/13/2024    CL 95 (L) 06/13/2024    CO2 36 (H) 06/13/2024    BUN 14 06/13/2024    CREATININE 0.43 (L) 06/13/2024    CALCIUM 9.1 06/13/2024    AST 19 06/13/2024    ALT 38 06/13/2024    ALKPHOS 138 (H) 06/13/2024    EGFR 147 06/13/2024     Imaging: I have personally reviewed pertinent reports.    EKG, Pathology, and Other Studies: I have personally reviewed pertinent reports.      Counseling / Coordination of Care  Total floor / unit time spent today 32 minutes.  Greater than 50% of total time was spent with the patient and / or family counseling and / or coordination of care.    Hermelindo Jay PA-C  6/13/2024 5:02 PM

## 2024-06-13 NOTE — PLAN OF CARE
Problem: Prexisting or High Potential for Compromised Skin Integrity  Goal: Skin integrity is maintained or improved  Description: INTERVENTIONS:  - Identify patients at risk for skin breakdown  - Assess and monitor skin integrity  - Assess and monitor nutrition and hydration status  - Monitor labs   - Assess for incontinence   - Turn and reposition patient  - Assist with mobility/ambulation  - Relieve pressure over bony prominences  - Avoid friction and shearing  - Provide appropriate hygiene as needed including keeping skin clean and dry  - Evaluate need for skin moisturizer/barrier cream  - Collaborate with interdisciplinary team   - Patient/family teaching  - Consider wound care consult   Outcome: Progressing     Problem: PAIN - ADULT  Goal: Verbalizes/displays adequate comfort level or baseline comfort level  Description: Interventions:  - Encourage patient to monitor pain and request assistance  - Assess pain using appropriate pain scale  - Administer analgesics based on type and severity of pain and evaluate response  - Implement non-pharmacological measures as appropriate and evaluate response  - Consider cultural and social influences on pain and pain management  - Notify physician/advanced practitioner if interventions unsuccessful or patient reports new pain  Outcome: Progressing     Problem: INFECTION - ADULT  Goal: Absence or prevention of progression during hospitalization  Description: INTERVENTIONS:  - Assess and monitor for signs and symptoms of infection  - Monitor lab/diagnostic results  - Monitor all insertion sites, i.e. indwelling lines, tubes, and drains  - Monitor endotracheal if appropriate and nasal secretions for changes in amount and color  - [place] appropriate cooling/warming therapies per order  - Administer medications as ordered  - Instruct and encourage patient and family to use good hand hygiene technique  - Identify and instruct in appropriate isolation precautions for  identified infection/condition  Outcome: Progressing  Goal: Absence of fever/infection during neutropenic period  Description: INTERVENTIONS:  - Monitor WBC    Outcome: Progressing     Problem: SAFETY ADULT  Goal: Patient will remain free of falls  Description: INTERVENTIONS:  - Educate patient/family on patient safety including physical limitations  - Instruct patient to call for assistance with activity   - Consult OT/PT to assist with strengthening/mobility   - Keep Call bell within reach  - Keep bed low and locked with side rails adjusted as appropriate  - Keep care items and personal belongings within reach  - Initiate and maintain comfort rounds  - Make Fall Risk Sign visible to staff  - Offer Toileting every - Hours, in advance of need  - Initiate/Maintain -alarm  - Obtain necessary fall risk management equipment: -  - Apply yellow socks and bracelet for high fall risk patients  - Consider moving patient to room near nurses station  Outcome: Progressing  Goal: Maintain or return to baseline ADL function  Description: INTERVENTIONS:  -  Assess patient's ability to carry out ADLs; assess patient's baseline for ADL function and identify physical deficits which impact ability to perform ADLs (bathing, care of mouth/teeth, toileting, grooming, dressing, etc.)  - Assess/evaluate cause of self-care deficits   - Assess range of motion  - Assess patient's mobility; develop plan if impaired  - Assess patient's need for assistive devices and provide as appropriate  - Encourage maximum independence but intervene and supervise when necessary  - Involve family in performance of ADLs  - Assess for home care needs following discharge   - Consider OT consult to assist with ADL evaluation and planning for discharge  - Provide patient education as appropriate  Outcome: Progressing  Goal: Maintains/Returns to pre admission functional level  Description: INTERVENTIONS:  - Perform AM-PAC 6 Click Basic Mobility/ Daily Activity  assessment daily.  - Set and communicate daily mobility goal to care team and patient/family/caregiver.   - Collaborate with rehabilitation services on mobility goals if consulted  - Perform Range of Motion - times a day.  - Reposition patient every - hours.  - Dangle patient - times a day  - Stand patient - times a day  - Ambulate patient - times a day  - Out of bed to chair - times a day   - Out of bed for meals --- times a day  - Out of bed for toileting  - Record patient progress and toleration of activity level   Outcome: Progressing     Problem: DISCHARGE PLANNING  Goal: Discharge to home or other facility with appropriate resources  Description: INTERVENTIONS:  - Identify barriers to discharge w/patient and caregiver  - Arrange for needed discharge resources and transportation as appropriate  - Identify discharge learning needs (meds, wound care, etc.)  - Arrange for interpretive services to assist at discharge as needed  - Refer to Case Management Department for coordinating discharge planning if the patient needs post-hospital services based on physician/advanced practitioner order or complex needs related to functional status, cognitive ability, or social support system  Outcome: Progressing     Problem: Knowledge Deficit  Goal: Patient/family/caregiver demonstrates understanding of disease process, treatment plan, medications, and discharge instructions  Description: Complete learning assessment and assess knowledge base.  Interventions:  - Provide teaching at level of understanding  - Provide teaching via preferred learning methods  Outcome: Progressing     Problem: Nutrition/Hydration-ADULT  Goal: Nutrient/Hydration intake appropriate for improving, restoring or maintaining nutritional needs  Description: Monitor and assess patient's nutrition/hydration status for malnutrition. Collaborate with interdisciplinary team and initiate plan and interventions as ordered.  Monitor patient's weight and dietary  intake as ordered or per policy. Utilize nutrition screening tool and intervene as necessary. Determine patient's food preferences and provide high-protein, high-caloric foods as appropriate.     INTERVENTIONS:  - Monitor oral intake, urinary output, labs, and treatment plans  - Assess nutrition and hydration status and recommend course of action  - Evaluate amount of meals eaten  - Assist patient with eating if necessary   - Allow adequate time for meals  - Recommend/ encourage appropriate diets, oral nutritional supplements, and vitamin/mineral supplements  - Order, calculate, and assess calorie counts as needed  - Recommend, monitor, and adjust tube feedings and TPN/PPN based on assessed needs  - Assess need for intravenous fluids  - Provide specific nutrition/hydration education as appropriate  - Include patient/family/caregiver in decisions related to nutrition  Outcome: Progressing

## 2024-06-13 NOTE — RESPIRATORY THERAPY NOTE
RT Ventilator Management Note  Hemanht Swanson 39 y.o. male MRN: [id number]  [facility]: [id number]      Daily Screen         6/11/2024  1047 6/12/2024  0800          Patient safety screen outcome:: Failed Failed      Not Ready for Weaning due to:: -- PEEP > 8cmH2O                Physical Exam:   Assessment Type: Assess only  General Appearance: Awake  Respiratory Pattern: Assisted  Chest Assessment: Chest expansion symmetrical  Bilateral Breath Sounds: Coarse, Rhonchi      Resp Comments: pt remained on listed vent settings through the night with no changes. Pt had 1 desat episode which requried bagging, sat brought back up to 100% placed back on vent. Pt had possible plug.   06/13/24 0458   Respiratory Assessment   Assessment Type Assess only   General Appearance Awake   Respiratory Pattern Assisted   Chest Assessment Chest expansion symmetrical   Bilateral Breath Sounds Coarse;Rhonchi   Resp Comments pt remained on listed vent settings through the night with no changes. Pt had 1 desat episode which requried bagging, sat brought back up to 100% placed back on vent. Pt had possible plug.   Vent Information   Vent ID [id number]   Vent type Hook C3   Hook Vent Mode (S)CMV   $ Pulse Oximetry Spot Check Charge Completed   (S)CMV Settings   Resp Rate (BPM) 12 BPM   VT (mL) 500 mL   FIO2 (%) 40 %   PEEP (cmH2O) 15 cmH2O   I:E Ratio 1/4   Insp Time (%) 1 %   Flow Trigger (LPM) 3   Humidification Heater   Heater Temperature (Set) 87.8 °F (31 °C)   (S)CMV Actuals   Resp Rate (BPM) 20 BPM   VT (mL) 396   MV 7.9   MAP (cmH2O) 20 cmH2O   Peak Pressure (cmH2O) 28 cmH2O   I:E Ratio (Obs) 1/2   Insp Resistance 22   Heater Temperature (Obs) 87.8 °F (31 °C)   Static Compliance (mL/cmH20) 38.2 mL/cmH2O   Plateau Pressure (cm H2O) 20 cm H2O   (S)CMV Alarms   High Peak Pressure (cmH2O) 50   Low Pressure (cmH2O) 8 cm H2O   High Resp Rate (BPM) 40 BPM   Low Resp Rate (BPM) 8 BPM   High MV (L/min) 16 L/min   Low MV  (L/min) 4 L/min   High VT (mL) 1000 mL   Low VT (mL) 250 mL   Apnea Time (s) 20 S   Maintenance   Alarm (pink) cable attached No   Resuscitation bag with peep valve at bedside Yes   Water bag changed No   Circuit changed No   IHI Ventilator Associated Pneumonia Bundle   Head of Bed Elevated HOB 30   Surgical Airway Distal;Cuffed;Other (Comment)   Placement Date/Time: 06/01/24 1155   Tube Size: 6  Placed By: Physician  Placed by (Name): Dr. Ronquillo  Type: Tracheostomy  Style: (c) Distal;Cuffed;Other (Comment)   Status Cuff Inflated   Surgical Airway Cuff Pressure (color) Green   Equipment at bedside BVM;Wall Suction setup;Additional complete same size trach tube;Additional complete one size smaller trach tube;Additional inner cannula

## 2024-06-13 NOTE — PROGRESS NOTES
[facility]  Progress Note: Critical Care  Name: Hemanth Swanson 39 y.o. male I MRN: [id number]  Unit/Bed#: [facility] 05 I Date of Admission: 6/10/2024   Date of Service: 6/13/2024 I Hospital Day: 3    Assessment & Plan   Seizure-like activity  S/p cardiac arrest  Ventilator associated pneumonia  Neuromuscular disorder  Testicular seminoma, s/p ochiectomy/chemo  Trach and peg dependence  CROW        Neuro:   Diagnosis: Seizure-like activity  EEG discontinued  Will consider brain MRI following 24 hours episode free   Keppra discontinued   Neurology following appreciate recs  Patient had one episode of hypoxia with bradycardia overnight at approximately 4:45 required bagging  Diagnosis: Neuromuscular disorder  Patient has completed both outpatient and inpatient workup without any definitve diagnosis  Possible  seronegative myasthenia variation due to chemo vs anoxic brain injury  No oral diet for now  Neurology following, appreciate recommendations  Diagnosis: Altered mental status   In the setting of hypoxia and bradycardia, patient otherwise alert and oriented  Plan: delirium precautions  Monitor oxygen saturation  Maintain sleep wake cycle  Lactic and ammonia obtained on arrival wnl     CV:   Diagnosis: Bradycardia  Plan: Patient continues with episodes of bradycardia, clinically suspect due to mucous plugging causing hypoxia   cardiac monitoring  Atropine prn  Lactic and ammonia obtained on arrival wnl  Diagnosis: s/p cardiac arrest with ROSC  Patient had episode of pulseless asystole without hypoxia, ROSC obtained after 2 rounds ACLS with clearance of secretions on 5/31  Continuous cardiac monitoring  Frequent suctioning  Echo 6/11 showing EF of 65%  Diagnosis: HFpEF  Plan:   Echo this admission showing EF 65%  Echo in 8/23 showing EF 60%  Daily weights  Strict Is and Os        Pulm:  Diagnosis: Ventilator associated multifocal pneumonia  Plan: Home Trilogy ventilator settings  AC/VC /RR 18/Peep 6 + 6 L O2 into ventilator. Uses albuterol q6 and mucomyst q6  Xopenex and hypertonic saline nebs  Cxr 6/11 showed hypoinflation, peep increased to 15  Hypoinflation seen on 611 cxr appears improved on cxr 6/12  Repeat episode of hypoxia and bradycardia around 4:45 AM this morning  Consider increasing trach size  Frequent suctioning  Chest PT  Possible bronchoscopy today  Intermittent episodes of hypoxia  ABG on admission showing elevated Pco2 at 54.2, normal PO2 at 102.5, bicarb elevated at 34.8  Repeat episode of hypoxia with bradycardia around 4:45 AM this morning  Repeat cxr this am  Status post bronchoscopy 6/12  Diagnosis: History pulmonary embolism  Hospitalized for right lower lobe segmental PE in August 2023, on eliquis since discharge  Continue eliquis     GI:   No acute issues, continue tube feeds     :   No acute issues  Limited urine output data, external urinary catheter does not fit properly  Only for 75 cc urine locked in past 24 hours, however tube feeds were held for bronchoscopy yesterday  BUN and creatinine within normal limits, EGFR appears normal  Continue to monitor urine output today        F/E/N:   F: none  E: monitor and replete as needed  N: tube feeds: Jevity 1.5 continuous 70 cc/hr w/ 200 cc free water flushes q4h       Heme/Onc:   Leukocytosis in the setting of multifocal pneumonia  Continue to monitor WBC count on antibiotics  Hgb stable at 11.4,no active signs of bleeding  Continue to monitor CBC  Platelets stable        Endo:   No acute problems  TSH and am cortisol wnl        ID:   Diagnosis: Ventilator associated multifocal pneumonia  Plan: Pseudomonas and serretia grown from bronchial washings  Patient status post 10 days antibiotics for treatment of ventilator associated pneumonia  No further plan for antibiotic treatment at this time  Chest x-ray 6/12 appears improved  Disposition: Critical care    ICU Core Measures     Vented Patient  VAP Bundle  VAP  bundle ordered     A: Assess, Prevent, and Manage Pain Has pain been assessed? Yes  Need for changes to pain regimen? No   B: Both Spontaneous Awakening Trials (SATs) and Spontaneous Breathing Trials (SBTs) Plan to perform spontaneous awakening trial today? N/A   Plan to perform spontaneous breathing trial today? N/A   Obvious barriers to extubation? Yes   C: Choice of Sedation RASS Goal: 0 Alert and Calm  Need for changes to sedation or analgesia regimen? No   D: Delirium CAM-ICU: Negative   E: Early Mobility  Plan for early mobility? No   F: Family Engagement Plan for family engagement today? Yes       Review of Invasive Devices:      Central access plan:  none      Prophylaxis:  VTE VTE covered by:  apixaban, Per PEG Tube, 5 mg at 06/12/24 1713       Stress Ulcer  not ordered        Significant 24hr Events     24hr events:  Patient had bronchoscopy yesterday. Had 1 episode of hypoxia to 60% followed bradycardia. Around 4:45 am.     Subjective   Patient seen and examined at bedside this a.m.  Patient comfortable appearing denies any current pain or concerns.    Review of Systems: See HPI for Review of Systems     Objective                            Vitals I/O      Most Recent Min/Max in 24hrs   Temp 97.8 °F (36.6 °C) Temp  Min: 97.7 °F (36.5 °C)  Max: 98 °F (36.7 °C)   Pulse 69 Pulse  Min: 59  Max: 81   Resp 15 Resp  Min: 12  Max: 21   /68 BP  Min: 94/62  Max: 158/90   O2 Sat 94 % SpO2  Min: 94 %  Max: 100 %      Intake/Output Summary (Last 24 hours) at 6/13/2024 0603  Last data filed at 6/13/2024 0000  Gross per 24 hour   Intake 1100 ml   Output 375 ml   Net 725 ml       Diet Enteral/Parenteral; Tube Feeding No Oral Diet; Jevity 1.5; Continuous; 70; 200; Water; Every 4 hours    Invasive Monitoring           Physical Exam   Physical Exam  Eyes:      Extraocular Movements: Extraocular movements intact.      Pupils: Pupils are equal, round, and reactive to light.   Skin:     General: Skin is warm and dry.    HENT:      Head: Normocephalic and atraumatic.      Mouth/Throat:      Mouth: Mucous membranes are moist.   Neck:      Trachea: Tracheostomy present.   Cardiovascular:      Rate and Rhythm: Normal rate and regular rhythm.   Musculoskeletal:      Right lower leg: No edema.      Left lower leg: No edema.   Constitutional:       Appearance: He is well-developed and well-nourished.      Interventions: He is intubated.   Pulmonary:      Effort: He is intubated.      Breath sounds: Rhonchi present. No wheezing or rales.   Neurological:      Mental Status: He is alert. He is calm.      Comments: Alert and awake. Responds to commands  3/5 bilateral lower leg strength   strength 5/5 bilaterally   Genitourinary/Anorectal:     Rectum: Rectal tube present.   external catheter present.          Diagnostic Studies      EKG: Sinus rhythm with PACs  Imaging: No new imaging      Medications:  Scheduled PRN   apixaban, 5 mg, BID  chlorhexidine, 15 mL, Q12H ISAEL  FLUoxetine, 10 mg, Daily  guaiFENesin, 200 mg, Q6H  levalbuterol, 1.25 mg, Q6H  midazolam, 4 mg, Once  JANES ANTIFUNGAL, , BID  propofol, 100 mg, Once  propofol, 100 mg, Once  scopolamine, 1 patch, Q72H  sodium chloride, 4 mL, Q6H      acetaminophen, 650 mg, Q8H PRN  atropine, 0.5 mg, Once PRN  LORazepam, 2 mg, Once PRN       Continuous          Labs:    CBC    Recent Labs     06/12/24 0444 06/13/24  0454   WBC 7.20 6.89   HGB 11.4* 11.4*   HCT 36.2* 37.0    216     BMP    Recent Labs     06/12/24 0444 06/13/24  0454   SODIUM 139 138   K 3.2* 3.8   CL 94* 95*   CO2 36* 36*   AGAP 9 7   BUN 16 14   CREATININE 0.43* 0.43*   CALCIUM 9.1 9.1       Coags    No recent results     Additional Electrolytes  Recent Labs     06/12/24 0444 06/13/24  0454   MG 2.1 2.0   PHOS 3.2 3.2   CAIONIZED 1.15 1.14          Blood Gas    No recent results  No recent results LFTs  Recent Labs     06/12/24 0444 06/13/24  0454   ALT 37 38   AST 18 19   ALKPHOS 128* 138*   ALB 3.7 3.7    TBILI 0.53 0.45       Infectious  No recent results  Glucose  Recent Labs     06/12/24  0444 06/13/24  0454   GLUC 108 107               Shara Samano MD

## 2024-06-13 NOTE — CONSULTS
OTOLARYNGOLOGY CONSULT    Date of Service: 6/13/2024    Reason for consult: trach concerns     ASSESSMENT/PLAN:    -possible repeat bedside trach upsize to size 8 vs OR trach exchange c concurrent DLB  -have obturator on patient's chart and spare trach with patient at all times, on and off the floor  -please have same size and one size smaller tracheostomy tube with patient at all times  -q4/PRN tracheal suctioning with RT and nursing  -q24/PRN inner canula change  -patient is not safe for PMV while cuff is inflated, PMV care/safety per SLP    -rest of care per primary    HPI  Hemanth Swanson is a 39 y.o. male c PMH of anoxic brain injury in 2023?, unclear possible NM disorder, toxic metabolic encephalopathy, PEG tube dependence, HTN, L testicular cancer s/p orchiectomy and chemo, PE on eliquis, vent dependency.  Exhibited O2 de-sat, increased SOB at PCP office, prompting admission for resp support pm 5/30/24.   Cardiac arrest on 5/31/24 s/p CPR x2 c ROSC.    Pt previously had size 8 trach in place, had replacement at [facility] to size 6, changed to size 6 XLT distal during this admission    Currently afebrile  Documented de-sat to 63%, 79% etc.    LABORATORY  6/13/24:  WBC 6.89  Hgb 11.4    6/1/24: bronchial culture  2+ serratia marcescens  2+ pseudomonas  (S/p 10d cefepime)    RADIOLOGY  CXR (6/12/24):  Bibasilar atelectasis, suboptimal inspiration    PROCEDURES  PROCEDURE: Flexible tracheoscopy  Indication: recurrent hypoxemia  Surgeon: Quinton Davis MD  Scope passed through trach  Trachea: tracheal tube sitting at appropriate position, trachea unremarkable, mucus pooling visualized in main bronchi  Scope was removed  Patient tolerated procedure well without complications      CURRENT HOSPITAL MEDICATIONS  Current Facility-Administered Medications   Medication Dose Route Frequency Provider Last Rate Last Admin    acetaminophen (TYLENOL) oral suspension 650 mg  650 mg Oral Q8H PRN Momo Bourne MD        apixaban  (ELIQUIS) tablet 5 mg  5 mg Per PEG Tube BID Momo Bourne MD   5 mg at 06/13/24 1000    Atropine Sulfate injection 0.5 mg  0.5 mg Intravenous Once PRN Shara Samano MD        chlorhexidine (PERIDEX) 0.12 % oral rinse 15 mL  15 mL Mouth/Throat Q12H ISAEL Momo Bourne MD   15 mL at 06/13/24 1000    FLUoxetine (PROzac) capsule 10 mg  10 mg Per G Tube Daily Momo Bourne MD   10 mg at 06/13/24 1000    guaiFENesin (ROBITUSSIN) oral liquid 200 mg  200 mg Oral Q6H Momo Bourne MD   200 mg at 06/13/24 1008    levalbuterol (XOPENEX) inhalation solution 1.25 mg  1.25 mg Nebulization Q6H Momo Bourne MD   1.25 mg at 06/13/24 1351    LORazepam (ATIVAN) injection 2 mg  2 mg Intravenous Once PRN Shara Samano MD        midazolam (VERSED) injection 4 mg  4 mg Intravenous Once Miladis Ferguson MD        moisture barrier miconazole 2% cream (aka JANES MOISTURE BARRIER ANTIFUNGAL CREAM)   Topical BID AMBER Lieberman   Given at 06/13/24 1000    propofol (DIPRIVAN) 200 MG/20ML bolus injection 100 mg  100 mg Intravenous Once Miladis Ferguson MD        propofol (DIPRIVAN) 200 MG/20ML bolus injection 100 mg  100 mg Intravenous Once Miladis Ferguson MD        scopolamine (TRANSDERM-SCOP) 1 mg/3 days TD 72 hr patch 1 patch  1 patch Transdermal Q72H Momo Bourne MD   1 patch at 06/12/24 1010    sodium chloride 3 % inhalation solution 4 mL  4 mL Nebulization Q6H Momo Bourne MD   4 mL at 06/13/24 1351       REVIEW OF SYSTEMS  As above    HISTORIES  PMH:  Past Medical History:   Diagnosis Date    Anxiety     Cancer (HCC)     Depression     Migration of percutaneous endoscopic gastrostomy (PEG) tube (HCC) 09/24/2023    Psychiatric disorder     bipolar       PSH:  Past Surgical History:   Procedure Laterality Date    IR BIOPSY LYMPH NODE  01/20/2023    IR GASTROSTOMY TUBE PLACEMENT  09/25/2023    IR LUMBAR PUNCTURE  09/06/2023    IR PORT PLACEMENT  03/14/2023    ORCHIECTOMY Left  "2022    Procedure: ORCHIECTOMY INGUINAL;  Surgeon: Flip Michael MD;  Location:  MAIN OR;  Service: Urology    PEG W/TRACHEOSTOMY PLACEMENT N/A 2023    Procedure: TRACHEOSTOMY WITH INSERTION PEG TUBE;  Surgeon: Rudy Mcmanus MD;  Location: [facility];  Service: General    TESTICLE SURGERY         SH:  Social History     Tobacco Use    Smoking status: Former     Current packs/day: 0.00     Average packs/day: 0.5 packs/day for 15.4 years (7.7 ttl pk-yrs)     Types: Cigarettes     Start date: 2008     Quit date: 2023     Years since quittin.0    Smokeless tobacco: Never   Vaping Use    Vaping status: Former    Start date: 2018    Quit date: 2023    Substances: Nicotine, THC   Substance Use Topics    Alcohol use: Not Currently     Comment: Former alcoholic. Last use in 2016    Drug use: Not Currently     Types: Marijuana, \"Crack\" cocaine     Comment: Current use medical marijuana. Not currently using crack cocaine.       FH:  Family History   Problem Relation Age of Onset    Coronary artery disease Maternal Aunt     Cancer Father     Diabetes Father     Hypertension Father        ALLERGIES:  Allergies   Allergen Reactions    Dog Epithelium Cough, Sneezing and Nasal Congestion       PHYSICAL EXAM  Visit Vitals  /72   Pulse 75   Temp 98.4 °F (36.9 °C) (Oral)   Resp 14   Ht 6' 4\" (1.93 m)   Wt 115 kg (253 lb 8.5 oz)   SpO2 99%   BMI 30.86 kg/m²   Smoking Status Former   BSA 2.45 m²       General: non-verbal  Ears: External ears normal in appearance  Nose: External appearance normal  Oral cavity: External appearance normal  Neck: 6-0 XLT distal cuffed Shiley trach in place, cuff up, in-line suctioned, no signs of injury around tracheal stoma  Lungs: vent (SCMV, , PEEP 15, O2 100%)  Vascular: Well perfused    Patient Active Problem List    Diagnosis Date Noted    Ventilator associated pneumonia (HCC) 2024    Cardiac arrest due to other underlying condition (HCC) " 06/11/2024    Seizure-like activity (HCC) 06/02/2024    Bradycardia 06/01/2024    Ventilator dependent ([facility]) 05/30/2024    Neuromuscular disorder ([facility]) 05/30/2024    Obesity hypoventilation syndrome ([facility]) 10/24/2023    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    Psychophysiological insomnia 10/18/2023    Impaired physical mobility 09/24/2023    Status post tracheostomy ([facility]) 09/14/2023    S/P percutaneous endoscopic gastrostomy (PEG) tube placement ([facility]) 09/12/2023    Anxiety 09/06/2023    Chronic respiratory failure with hypoxia and hypercapnia ([facility]) 08/24/2023    Sepsis ([facility]) 08/24/2023    Acute pulmonary embolism without acute cor pulmonale ([facility]) 08/23/2023    Depression 08/16/2023    Neuromuscular weakness ([facility]) 08/12/2023    History of LVH (left ventricular hypertrophy) 07/19/2023    Pure hypertriglyceridemia 07/19/2023    Unilateral vestibular schwannoma ([facility]) 04/20/2023    Port-A-Cath in place 03/08/2023    Diplopia 02/22/2023    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[facility]) 01/03/2023    Umbilical hernia without obstruction and without gangrene 12/10/2022    Tobacco use 12/10/2022    Retroperitoneal lymphadenopathy 12/10/2022       Quinton Davis MD  PGY-2  Otolaryngology - Head and Neck Surgery  6/13/2024 4:34 PM

## 2024-06-14 LAB
ANION GAP SERPL CALCULATED.3IONS-SCNC: 9 MMOL/L (ref 4–13)
BUN SERPL-MCNC: 13 MG/DL (ref 5–25)
CALCIUM SERPL-MCNC: 9.3 MG/DL (ref 8.4–10.2)
CHLORIDE SERPL-SCNC: 93 MMOL/L (ref 96–108)
CO2 SERPL-SCNC: 35 MMOL/L (ref 21–32)
CREAT SERPL-MCNC: 0.39 MG/DL (ref 0.6–1.3)
ERYTHROCYTE [DISTWIDTH] IN BLOOD BY AUTOMATED COUNT: 15.2 % (ref 11.6–15.1)
GFR SERPL CREATININE-BSD FRML MDRD: 153 ML/MIN/1.73SQ M
GLUCOSE SERPL-MCNC: 96 MG/DL (ref 65–140)
HCT VFR BLD AUTO: 38.4 % (ref 36.5–49.3)
HGB BLD-MCNC: 12.1 G/DL (ref 12–17)
MCH RBC QN AUTO: 29.5 PG (ref 26.8–34.3)
MCHC RBC AUTO-ENTMCNC: 31.5 G/DL (ref 31.4–37.4)
MCV RBC AUTO: 94 FL (ref 82–98)
PLATELET # BLD AUTO: 236 THOUSANDS/UL (ref 149–390)
PMV BLD AUTO: 10 FL (ref 8.9–12.7)
POTASSIUM SERPL-SCNC: 4.3 MMOL/L (ref 3.5–5.3)
RBC # BLD AUTO: 4.1 MILLION/UL (ref 3.88–5.62)
SODIUM SERPL-SCNC: 137 MMOL/L (ref 135–147)
WBC # BLD AUTO: 8.83 THOUSAND/UL (ref 4.31–10.16)

## 2024-06-14 PROCEDURE — 94760 N-INVAS EAR/PLS OXIMETRY 1: CPT

## 2024-06-14 PROCEDURE — 94003 VENT MGMT INPAT SUBQ DAY: CPT

## 2024-06-14 PROCEDURE — 95720 EEG PHY/QHP EA INCR W/VEEG: CPT | Performed by: PSYCHIATRY & NEUROLOGY

## 2024-06-14 PROCEDURE — 85027 COMPLETE CBC AUTOMATED: CPT

## 2024-06-14 PROCEDURE — 94664 DEMO&/EVAL PT USE INHALER: CPT

## 2024-06-14 PROCEDURE — 94640 AIRWAY INHALATION TREATMENT: CPT

## 2024-06-14 PROCEDURE — 99232 SBSQ HOSP IP/OBS MODERATE 35: CPT | Performed by: OTOLARYNGOLOGY

## 2024-06-14 PROCEDURE — 80048 BASIC METABOLIC PNL TOTAL CA: CPT

## 2024-06-14 PROCEDURE — 99291 CRITICAL CARE FIRST HOUR: CPT | Performed by: INTERNAL MEDICINE

## 2024-06-14 PROCEDURE — 94669 MECHANICAL CHEST WALL OSCILL: CPT

## 2024-06-14 RX ORDER — ENOXAPARIN SODIUM 150 MG/ML
1 INJECTION SUBCUTANEOUS EVERY 12 HOURS SCHEDULED
Status: COMPLETED | OUTPATIENT
Start: 2024-06-14 | End: 2024-06-16

## 2024-06-14 RX ADMIN — LEVALBUTEROL HYDROCHLORIDE 1.25 MG: 1.25 SOLUTION RESPIRATORY (INHALATION) at 19:10

## 2024-06-14 RX ADMIN — SODIUM CHLORIDE SOLN NEBU 3% 4 ML: 3 NEBU SOLN at 13:44

## 2024-06-14 RX ADMIN — GUAIFENESIN 200 MG: 100 SOLUTION ORAL at 09:45

## 2024-06-14 RX ADMIN — GUAIFENESIN 200 MG: 100 SOLUTION ORAL at 22:12

## 2024-06-14 RX ADMIN — CHLORHEXIDINE GLUCONATE 0.12% ORAL RINSE 15 ML: 1.2 LIQUID ORAL at 22:12

## 2024-06-14 RX ADMIN — CHLORHEXIDINE GLUCONATE 0.12% ORAL RINSE 15 ML: 1.2 LIQUID ORAL at 09:45

## 2024-06-14 RX ADMIN — SODIUM CHLORIDE SOLN NEBU 3% 4 ML: 3 NEBU SOLN at 00:57

## 2024-06-14 RX ADMIN — LEVALBUTEROL HYDROCHLORIDE 1.25 MG: 1.25 SOLUTION RESPIRATORY (INHALATION) at 07:47

## 2024-06-14 RX ADMIN — LEVALBUTEROL HYDROCHLORIDE 1.25 MG: 1.25 SOLUTION RESPIRATORY (INHALATION) at 00:57

## 2024-06-14 RX ADMIN — GUAIFENESIN 200 MG: 100 SOLUTION ORAL at 04:15

## 2024-06-14 RX ADMIN — MICONAZOLE NITRATE: 20 CREAM TOPICAL at 17:52

## 2024-06-14 RX ADMIN — ENOXAPARIN SODIUM 120 MG: 120 INJECTION SUBCUTANEOUS at 11:15

## 2024-06-14 RX ADMIN — SODIUM CHLORIDE SOLN NEBU 3% 4 ML: 3 NEBU SOLN at 07:47

## 2024-06-14 RX ADMIN — SODIUM CHLORIDE SOLN NEBU 3% 4 ML: 3 NEBU SOLN at 19:10

## 2024-06-14 RX ADMIN — LEVALBUTEROL HYDROCHLORIDE 1.25 MG: 1.25 SOLUTION RESPIRATORY (INHALATION) at 13:44

## 2024-06-14 RX ADMIN — MICONAZOLE NITRATE: 20 CREAM TOPICAL at 09:45

## 2024-06-14 RX ADMIN — FLUOXETINE HYDROCHLORIDE 10 MG: 10 CAPSULE ORAL at 09:45

## 2024-06-14 RX ADMIN — GUAIFENESIN 200 MG: 100 SOLUTION ORAL at 16:00

## 2024-06-14 RX ADMIN — ENOXAPARIN SODIUM 120 MG: 120 INJECTION SUBCUTANEOUS at 22:13

## 2024-06-14 NOTE — RESPIRATORY THERAPY NOTE
06/14/24 0451   Respiratory Assessment   Resp Comments Pt remains on CMV throughout night without incident.   Vent Information   Vent ID [id number]   Vent type Hook C3   Hook Vent Mode (S)CMV   $ Pulse Oximetry Spot Check Charge Completed   (S)CMV Settings   Resp Rate (BPM) 12 BPM   VT (mL) 500 mL   FIO2 (%) 40 %   PEEP (cmH2O) 15 cmH2O   I:E Ratio 1/4   Insp Time (%) 1 %   Flow Trigger (LPM) 3   Humidification Heater   Heater Temperature (Set) 87.8 °F (31 °C)   (S)CMV Actuals   Resp Rate (BPM) 13 BPM   VT (mL) 511   MV 6.9   MAP (cmH2O) 18 cmH2O   Peak Pressure (cmH2O) 35 cmH2O   I:E Ratio (Obs) 1/4.2   Insp Resistance 20   Heater Temperature (Obs) 87.8 °F (31 °C)   Static Compliance (mL/cmH20) 45 mL/cmH2O   (S)CMV Alarms   High Peak Pressure (cmH2O) 55   Low Pressure (cmH2O) 5 cm H2O   High Resp Rate (BPM) 40 BPM   Low Resp Rate (BPM) 6 BPM   High MV (L/min) 20 L/min   Low MV (L/min) 3.5 L/min   High VT (mL) 1000 mL   Low VT (mL) 250 mL   Apnea Time (s) 20 S   Maintenance   Alarm (pink) cable attached No   Resuscitation bag with peep valve at bedside Yes   Water bag changed No   Circuit changed No   Surgical Airway Distal;Cuffed;Other (Comment)   Placement Date/Time: 06/01/24 1155   Tube Size: 6  Placed By: Physician  Placed by (Name): Dr. Ronquillo  Type: Tracheostomy  Style: (c) Distal;Cuffed;Other (Comment)   Status Cuff Inflated;Secured   Equipment at bedside BVM;Wall Suction setup;Additional complete same size trach tube;Obturator;Sterile saline;Additional inner cannula

## 2024-06-14 NOTE — RESPIRATORY THERAPY NOTE
RT Ventilator Management Note  Hemanth Swanson 39 y.o. male MRN: [id number]  Unit/Bed#: [facility] 05 Encounter: [id number]      Daily Screen         6/13/2024  1346 6/14/2024  0748          Patient safety screen outcome:: Failed Failed      Not Ready for Weaning due to:: Underline problem not resolved;PEEP > 8cmH2O;FiO2 >60% PEEP > 8cmH2O                Physical Exam:   Assessment Type: During-treatment  General Appearance: Awake  Respiratory Pattern: Assisted  Chest Assessment: Chest expansion symmetrical  Bilateral Breath Sounds: Clear, Diminished  Suction: Trach  O2 Device: vent      Resp Comments: (P) Received on documented vent settings. Pt has a 6 XLT.  Bilateral BS clear/diminshed. Treatments given as sceduled. Suctioned for sm amt thick white/yellow secretions. PEEP weaned to 10. All necessary trach supplies at bedside

## 2024-06-14 NOTE — QUICK NOTE
Spoke to patient's sister Dmitry for patient update. Plan will be wean down PEEP and possible trach exchange pending patient's further response. I also encourage her to continue follow up with pulmonary office after discharge for his chronic vent dependent status.     During conversation allo questions answered. Family expressed they wish for [facility] office to follow up moving forward.

## 2024-06-14 NOTE — PROGRESS NOTES
[facility]  Progress Note: Critical Care  Name: Hemanth Swanson 39 y.o. male I MRN: [id number]  Unit/Bed#: [facility]U 05 I Date of Admission: 6/10/2024   Date of Service: 6/14/2024 I Hospital Day: 4    Assessment & Plan     Seizure-like activity  S/p cardiac arrest  Ventilator associated pneumonia  Neuromuscular disorder  Testicular seminoma, s/p ochiectomy/chemo  Trach and peg dependence  CROW        Neuro:   Diagnosis: Seizure-like activity  EEG discontinued  Will consider brain MRI following 24 hours episode free   Keppra discontinued   Neurology following appreciate recs  Diagnosis: Neuromuscular disorder  Patient has completed both outpatient and inpatient workup without any definitve diagnosis  Possible  seronegative myasthenia variation due to chemo vs anoxic brain injury  No oral diet for now  Neurology following, appreciate recommendations  Diagnosis: Altered mental status   In the setting of hypoxia and bradycardia, patient otherwise alert and oriented  Plan: delirium precautions  Monitor oxygen saturation  Maintain sleep wake cycle  Lactic and ammonia obtained on arrival wnl     CV:   Diagnosis: Bradycardia  Plan: Patient continues with episodes of bradycardia, clinically suspect due to mucous plugging causing hypoxia   cardiac monitoring  Atropine prn  Lactic and ammonia obtained on arrival wnl  Diagnosis: s/p cardiac arrest with ROSC  Patient had episode of pulseless asystole without hypoxia, ROSC obtained after 2 rounds ACLS with clearance of secretions on 5/31  Continuous cardiac monitoring  Frequent suctioning  Echo 6/11 showing EF of 65%  Diagnosis: HFpEF  Plan:   Echo this admission showing EF 65%  Echo in 8/23 showing EF 60%  Daily weights  Strict Is and Os        Pulm:  Diagnosis: Ventilator associated multifocal pneumonia  Plan: Home Trilogy ventilator settings AC/VC /RR 18/Peep 6 + 6 L O2 into ventilator. Uses albuterol q6 and mucomyst q6  Xopenex and  hypertonic saline nebs  Cxr 6/11 showed hypoinflation, peep increased to 15  Hypoinflation seen on 611 cxr appears improved on cxr 6/12  Wean peep to goal of 8 and maintain 24 hours prior to trach size change  ENT plan to increase trach size Monday, NPO midnight  Frequent suctioning  Chest PT  Intermittent episodes of hypoxia  ABG on admission showing elevated Pco2 at 54.2, normal PO2 at 102.5, bicarb elevated at 34.8  Status post bronchoscopy 6/12  Diagnosis: History pulmonary embolism  Hospitalized for right lower lobe segmental PE in August 2023, on eliquis since discharge  Continue eliquis     GI:   No acute issues, continue tube feeds     :   No acute issues  Limited urine output data, external urinary catheter does not fit properly  Limited urine output, continue to monitor  BUN and creatinine within normal limits, EGFR appears normal  Continue to monitor urine output today        F/E/N:   F: none  E: monitor and replete as needed  N: tube feeds: Jevity 1.5 continuous 70 cc/hr w/ 200 cc free water flushes q4h       Heme/Onc:   Leukocytosis in the setting of multifocal pneumonia  Continue to monitor WBC count on antibiotics  Hgb stable at 11.4,no active signs of bleeding  Continue to monitor CBC  Platelets stable        Endo:   No acute problems  TSH and am cortisol wnl        ID:   Diagnosis: Ventilator associated multifocal pneumonia  Plan: Pseudomonas and serretia grown from bronchial washings  Patient status post 10 days antibiotics for treatment of ventilator associated pneumonia  No further plan for antibiotic treatment at this time  Chest x-ray 6/12 appears improved    Disposition: Critical care    ICU Core Measures     Vented Patient  VAP Bundle  VAP bundle ordered     A: Assess, Prevent, and Manage Pain Has pain been assessed? Yes  Need for changes to pain regimen? No   B: Both Spontaneous Awakening Trials (SATs) and Spontaneous Breathing Trials (SBTs) Plan to perform spontaneous awakening trial  today? N/A   Plan to perform spontaneous breathing trial today? Chronic vent   Obvious barriers to extubation? Yes   C: Choice of Sedation RASS Goal: 0 Alert and Calm  Need for changes to sedation or analgesia regimen? NA   D: Delirium CAM-ICU: Negative   E: Early Mobility  Plan for early mobility? Yes   F: Family Engagement Plan for family engagement today? Yes       Review of Invasive Devices:      Central access plan:  none      Prophylaxis:  VTE VTE covered by:  apixaban, Per PEG Tube, 5 mg at 06/13/24 1823       Stress Ulcer  not ordered        Significant 24hr Events     24hr events: No other episodes of hypoxia or bradycardia overnight.      Subjective          Objective                            Vitals I/O      Most Recent Min/Max in 24hrs   Temp 99 °F (37.2 °C) Temp  Min: 98 °F (36.7 °C)  Max: 99 °F (37.2 °C)   Pulse 69 Pulse  Min: 59  Max: 78   Resp 15 Resp  Min: 12  Max: 24   /65 BP  Min: 95/56  Max: 145/84   O2 Sat 99 % SpO2  Min: 94 %  Max: 100 %      Intake/Output Summary (Last 24 hours) at 6/14/2024 0539  Last data filed at 6/13/2024 1801  Gross per 24 hour   Intake 1110 ml   Output 540 ml   Net 570 ml       Diet Enteral/Parenteral; Tube Feeding No Oral Diet; Jevity 1.5; Continuous; 70; 200; Water; Every 4 hours    Invasive Monitoring           Physical Exam   Physical Exam  Eyes:      Extraocular Movements: Extraocular movements intact.   Skin:     General: Skin is warm and dry.   HENT:      Head: Normocephalic and atraumatic.      Mouth/Throat:      Mouth: Mucous membranes are moist.   Cardiovascular:      Rate and Rhythm: Normal rate and regular rhythm.      Pulses: Normal pulses.   Abdominal: General: Bowel sounds are normal.      Palpations: Abdomen is soft.      Tenderness: There is no abdominal tenderness.   Constitutional:       Appearance: He is well-developed and well-nourished.   Pulmonary:      Breath sounds: Rhonchi (Diffuse) present. No wheezing or rales.   Neurological:       Mental Status: Mental status is at baseline.      Comments: Nods and shakes head in response to questions            Diagnostic Studies      EKG: Sinus rhythm  Imaging: no new imaging      Medications:  Scheduled PRN   apixaban, 5 mg, BID  chlorhexidine, 15 mL, Q12H ISAEL  FLUoxetine, 10 mg, Daily  guaiFENesin, 200 mg, Q6H  levalbuterol, 1.25 mg, Q6H  midazolam, 4 mg, Once  JANES ANTIFUNGAL, , BID  propofol, 100 mg, Once  propofol, 100 mg, Once  scopolamine, 1 patch, Q72H  sodium chloride, 4 mL, Q6H      acetaminophen, 650 mg, Q8H PRN  atropine, 0.5 mg, Once PRN  LORazepam, 2 mg, Once PRN       Continuous          Labs:    CBC    Recent Labs     06/13/24  0454 06/14/24  0525   WBC 6.89 8.83   HGB 11.4* 12.1   HCT 37.0 38.4    236     BMP    Recent Labs     06/13/24  0454   SODIUM 138   K 3.8   CL 95*   CO2 36*   AGAP 7   BUN 14   CREATININE 0.43*   CALCIUM 9.1       Coags    No recent results     Additional Electrolytes  Recent Labs     06/13/24  0454   MG 2.0   PHOS 3.2   CAIONIZED 1.14          Blood Gas    No recent results  No recent results LFTs  Recent Labs     06/13/24  0454   ALT 38   AST 19   ALKPHOS 138*   ALB 3.7   TBILI 0.45       Infectious  No recent results  Glucose  Recent Labs     06/13/24  0454   GLUC 107               Shara Samano MD

## 2024-06-14 NOTE — PLAN OF CARE
Problem: Prexisting or High Potential for Compromised Skin Integrity  Goal: Skin integrity is maintained or improved  Description: INTERVENTIONS:  - Identify patients at risk for skin breakdown  - Assess and monitor skin integrity  - Assess and monitor nutrition and hydration status  - Monitor labs   - Assess for incontinence   - Turn and reposition patient  - Assist with mobility/ambulation  - Relieve pressure over bony prominences  - Avoid friction and shearing  - Provide appropriate hygiene as needed including keeping skin clean and dry  - Evaluate need for skin moisturizer/barrier cream  - Collaborate with interdisciplinary team   - Patient/family teaching  - Consider wound care consult   Outcome: Not Progressing     Problem: PAIN - ADULT  Goal: Verbalizes/displays adequate comfort level or baseline comfort level  Description: Interventions:  - Encourage patient to monitor pain and request assistance  - Assess pain using appropriate pain scale  - Administer analgesics based on type and severity of pain and evaluate response  - Implement non-pharmacological measures as appropriate and evaluate response  - Consider cultural and social influences on pain and pain management  - Notify physician/advanced practitioner if interventions unsuccessful or patient reports new pain  Outcome: Not Progressing     Problem: INFECTION - ADULT  Goal: Absence or prevention of progression during hospitalization  Description: INTERVENTIONS:  - Assess and monitor for signs and symptoms of infection  - Monitor lab/diagnostic results  - Monitor all insertion sites, i.e. indwelling lines, tubes, and drains  - Monitor endotracheal if appropriate and nasal secretions for changes in amount and color  - [place] appropriate cooling/warming therapies per order  - Administer medications as ordered  - Instruct and encourage patient and family to use good hand hygiene technique  - Identify and instruct in appropriate isolation precautions for  identified infection/condition  Outcome: Not Progressing  Goal: Absence of fever/infection during neutropenic period  Description: INTERVENTIONS:  - Monitor WBC    Outcome: Not Progressing     Problem: SAFETY ADULT  Goal: Patient will remain free of falls  Description: INTERVENTIONS:  - Educate patient/family on patient safety including physical limitations  - Instruct patient to call for assistance with activity   - Consult OT/PT to assist with strengthening/mobility   - Keep Call bell within reach  - Keep bed low and locked with side rails adjusted as appropriate  - Keep care items and personal belongings within reach  - Initiate and maintain comfort rounds  - Make Fall Risk Sign visible to staff  - Apply yellow socks and bracelet for high fall risk patients  - Consider moving patient to room near nurses station  Outcome: Not Progressing  Goal: Maintain or return to baseline ADL function  Description: INTERVENTIONS:  -  Assess patient's ability to carry out ADLs; assess patient's baseline for ADL function and identify physical deficits which impact ability to perform ADLs (bathing, care of mouth/teeth, toileting, grooming, dressing, etc.)  - Assess/evaluate cause of self-care deficits   - Assess range of motion  - Assess patient's mobility; develop plan if impaired  - Assess patient's need for assistive devices and provide as appropriate  - Encourage maximum independence but intervene and supervise when necessary  - Involve family in performance of ADLs  - Assess for home care needs following discharge   - Consider OT consult to assist with ADL evaluation and planning for discharge  - Provide patient education as appropriate  Outcome: Not Progressing  Goal: Maintains/Returns to pre admission functional level  Description: INTERVENTIONS:  - Perform AM-PAC 6 Click Basic Mobility/ Daily Activity assessment daily.  - Set and communicate daily mobility goal to care team and patient/family/caregiver.   - Collaborate  with rehabilitation services on mobility goals if consulted  - Out of bed for toileting  - Record patient progress and toleration of activity level   Outcome: Not Progressing     Problem: DISCHARGE PLANNING  Goal: Discharge to home or other facility with appropriate resources  Description: INTERVENTIONS:  - Identify barriers to discharge w/patient and caregiver  - Arrange for needed discharge resources and transportation as appropriate  - Identify discharge learning needs (meds, wound care, etc.)  - Arrange for interpretive services to assist at discharge as needed  - Refer to Case Management Department for coordinating discharge planning if the patient needs post-hospital services based on physician/advanced practitioner order or complex needs related to functional status, cognitive ability, or social support system  Outcome: Not Progressing     Problem: Knowledge Deficit  Goal: Patient/family/caregiver demonstrates understanding of disease process, treatment plan, medications, and discharge instructions  Description: Complete learning assessment and assess knowledge base.  Interventions:  - Provide teaching at level of understanding  - Provide teaching via preferred learning methods  Outcome: Not Progressing     Problem: Nutrition/Hydration-ADULT  Goal: Nutrient/Hydration intake appropriate for improving, restoring or maintaining nutritional needs  Description: Monitor and assess patient's nutrition/hydration status for malnutrition. Collaborate with interdisciplinary team and initiate plan and interventions as ordered.  Monitor patient's weight and dietary intake as ordered or per policy. Utilize nutrition screening tool and intervene as necessary. Determine patient's food preferences and provide high-protein, high-caloric foods as appropriate.     INTERVENTIONS:  - Monitor oral intake, urinary output, labs, and treatment plans  - Assess nutrition and hydration status and recommend course of action  - Evaluate  amount of meals eaten  - Assist patient with eating if necessary   - Allow adequate time for meals  - Recommend/ encourage appropriate diets, oral nutritional supplements, and vitamin/mineral supplements  - Order, calculate, and assess calorie counts as needed  - Recommend, monitor, and adjust tube feedings and TPN/PPN based on assessed needs  - Assess need for intravenous fluids  - Provide specific nutrition/hydration education as appropriate  - Include patient/family/caregiver in decisions related to nutrition  Outcome: Not Progressing

## 2024-06-14 NOTE — PROGRESS NOTES
"Otolaryngology HN/FPRS Progress Note:    Subjective: No acute events overnight.   Remains on vent.    Objective:   /65   Pulse 69   Temp 99 °F (37.2 °C) (Oral)   Resp 15   Ht 6' 4\" (1.93 m)   Wt 115 kg (253 lb 8.5 oz)   SpO2 99%   BMI 30.86 kg/m²     Physical Exam   Gen: NAD, A/O x 3.  Neuro: CN 2-12 intact except as below  Neck: 6-0 XLT distal cuffed Shiley trach in place, cuff up  Lungs: vent (CMV, , PEEP 12, O2 40%)  CV: Good distal perfusion    Assessment/Plan: Patient with tracheostomy size concerns. Stable overnight. ACS attempted to upsize trach on 6/13 and patient became hypoxic. Will attempt to come down on PEEP prior to exchanging tracheostomy.     -No acute interventions planned by ENT  -Please attempt to decrease PEEP to 8 or under  -Possible repeat bedside trach upsize to size 8 vs OR trach exchange   -Have obturator on patient's chart and spare trach with patient at all times, on and off the floor  -Please have same size and one size smaller tracheostomy tube with patient at all times  -Q4/PRN tracheal suctioning with RT and nursing  -Q24/PRN inner canula change  -Patient is not safe for PMV while cuff is inflated, PMV care/safety per SLP     -rest of care per primary    ENT will follow from the periphery. Please reach out when PEEP is 8 or less. Please reach out with questions/concerns    Dispo: ICU   "

## 2024-06-14 NOTE — NUTRITION
Nutrition Follow-Up:       06/14/24 3655   Recommendations/Interventions   Interventions/Recommendations Continue EN as ordered   Recommendations to Provider Continue home EN regimen as currently ordered. Continue to monitor GI function/stool output.

## 2024-06-14 NOTE — CASE MANAGEMENT
Case Management Discharge Planning Note    Patient name Hemanth Swanson  [facility] [id number]  : 1985 Date 2024       Current Admission Date: 6/10/2024  Current Admission Diagnosis:Umbilical hernia without obstruction and without gangrene   Patient Active Problem List    Diagnosis Date Noted Date Diagnosed    Ventilator associated pneumonia (HCC) 2024     Cardiac arrest due to other underlying condition ([facility]) 2024     Seizure-like activity (HCC) 2024     Bradycardia 2024     Ventilator dependent ([facility]) 2024     Neuromuscular disorder ([facility]) 2024     Obesity hypoventilation syndrome ([facility]) 10/24/2023     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     Psychophysiological insomnia 10/18/2023     Impaired physical mobility 2023     Status post tracheostomy ([facility]) 2023     S/P percutaneous endoscopic gastrostomy (PEG) tube placement ([facility]) 2023     Anxiety 2023     Chronic respiratory failure with hypoxia and hypercapnia ([facility]) 2023     Sepsis ([facility]) 2023     Acute pulmonary embolism without acute cor pulmonale ([facility]) 2023     Depression 2023     Neuromuscular weakness ([facility]) 2023     History of LVH (left ventricular hypertrophy) 2023     Pure hypertriglyceridemia 2023     Unilateral vestibular schwannoma ([facility]) 2023     Port-A-Cath in place 2023     Diplopia 2023     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HCC) 2023     Umbilical hernia without obstruction and without gangrene 12/10/2022     Tobacco use 12/10/2022     Retroperitoneal lymphadenopathy 12/10/2022       LOS (days): 4  Geometric Mean LOS (GMLOS) (days):   Days to GMLOS:     OBJECTIVE:  Risk of Unplanned Readmission Score: 30.26         Current admission status: Inpatient   Preferred Pharmacy:   [id number] - [address] [address]  Phone: [phone]  Fax: [phone]    Primary Care Provider: Ela Douglas MD    Primary Insurance: [facility]  Secondary Insurance:     DISCHARGE DETAILS:          Other Referral/Resources/Interventions Provided:  Referral Comments: Received tc from David russ/[facility] ([phone])  re: dcp.  Pt has waiver services and wanted to be sure they were notified of dc so they may resume waiver services.  Pt has CG 24/7.  Pt's  is Alexia Higgins and she can be reached at [phone]. Notified David no anticipated date dc at this time.

## 2024-06-15 LAB — GLUCOSE SERPL-MCNC: 88 MG/DL (ref 65–140)

## 2024-06-15 PROCEDURE — 94669 MECHANICAL CHEST WALL OSCILL: CPT

## 2024-06-15 PROCEDURE — 94640 AIRWAY INHALATION TREATMENT: CPT

## 2024-06-15 PROCEDURE — 99291 CRITICAL CARE FIRST HOUR: CPT | Performed by: INTERNAL MEDICINE

## 2024-06-15 PROCEDURE — 94664 DEMO&/EVAL PT USE INHALER: CPT

## 2024-06-15 PROCEDURE — 94003 VENT MGMT INPAT SUBQ DAY: CPT

## 2024-06-15 PROCEDURE — 82948 REAGENT STRIP/BLOOD GLUCOSE: CPT

## 2024-06-15 PROCEDURE — 94760 N-INVAS EAR/PLS OXIMETRY 1: CPT

## 2024-06-15 RX ORDER — FUROSEMIDE 10 MG/ML
40 INJECTION INTRAMUSCULAR; INTRAVENOUS ONCE
Status: COMPLETED | OUTPATIENT
Start: 2024-06-15 | End: 2024-06-15

## 2024-06-15 RX ORDER — ALBUTEROL SULFATE 2.5 MG/3ML
SOLUTION RESPIRATORY (INHALATION)
Status: COMPLETED
Start: 2024-06-15 | End: 2024-06-15

## 2024-06-15 RX ORDER — ALBUTEROL SULFATE 2.5 MG/3ML
2.5 SOLUTION RESPIRATORY (INHALATION) EVERY 4 HOURS PRN
Status: DISCONTINUED | OUTPATIENT
Start: 2024-06-15 | End: 2024-06-22 | Stop reason: HOSPADM

## 2024-06-15 RX ORDER — POTASSIUM CHLORIDE 20MEQ/15ML
20 LIQUID (ML) ORAL ONCE
Status: COMPLETED | OUTPATIENT
Start: 2024-06-15 | End: 2024-06-15

## 2024-06-15 RX ORDER — POTASSIUM CHLORIDE 20 MEQ/1
20 TABLET, EXTENDED RELEASE ORAL ONCE
Status: DISCONTINUED | OUTPATIENT
Start: 2024-06-15 | End: 2024-06-15

## 2024-06-15 RX ADMIN — SODIUM CHLORIDE SOLN NEBU 3% 4 ML: 3 NEBU SOLN at 13:33

## 2024-06-15 RX ADMIN — CHLORHEXIDINE GLUCONATE 0.12% ORAL RINSE 15 ML: 1.2 LIQUID ORAL at 10:00

## 2024-06-15 RX ADMIN — FLUOXETINE HYDROCHLORIDE 10 MG: 10 CAPSULE ORAL at 10:00

## 2024-06-15 RX ADMIN — LEVALBUTEROL HYDROCHLORIDE 1.25 MG: 1.25 SOLUTION RESPIRATORY (INHALATION) at 19:04

## 2024-06-15 RX ADMIN — SODIUM CHLORIDE SOLN NEBU 3% 4 ML: 3 NEBU SOLN at 01:29

## 2024-06-15 RX ADMIN — SODIUM CHLORIDE SOLN NEBU 3% 4 ML: 3 NEBU SOLN at 19:04

## 2024-06-15 RX ADMIN — POTASSIUM CHLORIDE 20 MEQ: 1.5 SOLUTION ORAL at 10:52

## 2024-06-15 RX ADMIN — ALBUTEROL SULFATE 2.5 MG: 2.5 SOLUTION RESPIRATORY (INHALATION) at 11:17

## 2024-06-15 RX ADMIN — MICONAZOLE NITRATE: 20 CREAM TOPICAL at 10:00

## 2024-06-15 RX ADMIN — GUAIFENESIN 200 MG: 100 SOLUTION ORAL at 04:54

## 2024-06-15 RX ADMIN — SODIUM CHLORIDE SOLN NEBU 3% 4 ML: 3 NEBU SOLN at 07:26

## 2024-06-15 RX ADMIN — GUAIFENESIN 200 MG: 100 SOLUTION ORAL at 21:15

## 2024-06-15 RX ADMIN — MICONAZOLE NITRATE: 20 CREAM TOPICAL at 18:45

## 2024-06-15 RX ADMIN — LEVALBUTEROL HYDROCHLORIDE 1.25 MG: 1.25 SOLUTION RESPIRATORY (INHALATION) at 07:26

## 2024-06-15 RX ADMIN — LEVALBUTEROL HYDROCHLORIDE 1.25 MG: 1.25 SOLUTION RESPIRATORY (INHALATION) at 13:33

## 2024-06-15 RX ADMIN — LEVALBUTEROL HYDROCHLORIDE 1.25 MG: 1.25 SOLUTION RESPIRATORY (INHALATION) at 01:29

## 2024-06-15 RX ADMIN — CHLORHEXIDINE GLUCONATE 0.12% ORAL RINSE 15 ML: 1.2 LIQUID ORAL at 21:15

## 2024-06-15 RX ADMIN — ENOXAPARIN SODIUM 120 MG: 120 INJECTION SUBCUTANEOUS at 21:15

## 2024-06-15 RX ADMIN — GUAIFENESIN 200 MG: 100 SOLUTION ORAL at 10:00

## 2024-06-15 RX ADMIN — GUAIFENESIN 200 MG: 100 SOLUTION ORAL at 14:56

## 2024-06-15 RX ADMIN — ENOXAPARIN SODIUM 120 MG: 120 INJECTION SUBCUTANEOUS at 10:00

## 2024-06-15 RX ADMIN — FUROSEMIDE 40 MG: 10 INJECTION, SOLUTION INTRAMUSCULAR; INTRAVENOUS at 10:52

## 2024-06-15 RX ADMIN — SCOPOLAMINE 1 PATCH: 1 SYSTEM TRANSDERMAL at 10:00

## 2024-06-15 NOTE — PROGRESS NOTES
"Otolaryngology HN/FPRS Progress Note:    Subjective: No acute events overnight.   Remains on vent.    Objective:   /81   Pulse 64   Temp 98.8 °F (37.1 °C) (Oral)   Resp 13   Ht 6' 4\" (1.93 m)   Wt 115 kg (253 lb 8.5 oz)   SpO2 91%   BMI 30.86 kg/m²     Physical Exam   Gen: NAD, A/O x 3.  Neuro: CN 2-12 intact except as below  Neck: 6-0 XLT distal cuffed Shiley trach in place, cuff up  Lungs: vent (CMV, , PEEP 10, O2 40%)  CV: Good distal perfusion    Assessment/Plan: Patient with tracheostomy size concerns. Stable overnight. ACS attempted to upsize trach on 6/13 and patient became hypoxic. Will attempt to come down on PEEP prior to exchanging tracheostomy. Spoke to RT at bedside, they will attempt to decrease PEEP and FiO2 today as tolerated.     -No acute interventions planned by ENT  -Please attempt to decrease PEEP to 8 or under  -Possible repeat bedside trach upsize to size 8 vs OR trach exchange   -Have obturator on patient's chart and spare trach with patient at all times, on and off the floor  -Please have same size and one size smaller tracheostomy tube with patient at all times  -Q4/PRN tracheal suctioning with RT and nursing  -Q24/PRN inner canula change  -Patient is not safe for PMV while cuff is inflated, PMV care/safety per SLP     -rest of care per primary    ENT will follow from the periphery. Please reach out when PEEP is 8 or less. Please reach out with questions/concerns    Dispo: ICU   "

## 2024-06-15 NOTE — PROGRESS NOTES
[facility]  Progress Note: Critical Care  Name: Hemanth Swanson 39 y.o. male I MRN: [id number]  Unit/Bed#: MICU 05 I Date of Admission: 6/10/2024   Date of Service: 6/15/2024 I Hospital Day: 5    Assessment & Plan     Seizure-like activity  S/p cardiac arrest  Ventilator associated pneumonia  Neuromuscular disorder  Testicular seminoma, s/p ochiectomy/chemo  Trach and peg dependence  CROW     Neuro:   Diagnosis: Seizure-like activity  EEG discontinued  Will consider brain MRI following 24 hours episode free   Keppra discontinued   Neurology following appreciate recs  Diagnosis: Neuromuscular disorder  Patient has completed both outpatient and inpatient workup without any definitve diagnosis  Possible  seronegative myasthenia variation due to chemo vs anoxic brain injury  No oral diet for now  Neurology following, appreciate recommendations  Diagnosis: Altered mental status   In the setting of hypoxia and bradycardia, patient otherwise alert and oriented  Plan: delirium precautions  Monitor oxygen saturation  Maintain sleep wake cycle  Lactic and ammonia obtained on arrival wnl     CV:   Diagnosis: Bradycardia  Plan: Patient continues with episodes of bradycardia, clinically suspect due to mucous plugging causing hypoxia   cardiac monitoring  Atropine prn  Lactic and ammonia obtained on arrival wnl  Diagnosis: s/p cardiac arrest with ROSC  Patient had episode of pulseless asystole without hypoxia, ROSC obtained after 2 rounds ACLS with clearance of secretions on 5/31  Continuous cardiac monitoring  Frequent suctioning  Echo 6/11 showing EF of 65%  Diagnosis: HFpEF  Plan:   Echo this admission showing EF 65%  Echo in 8/23 showing EF 60%  Daily weights  Strict Is and Os        Pulm:  Diagnosis: Ventilator associated multifocal pneumonia  Plan: Home Trilogy ventilator settings AC/VC /RR 18/Peep 6 + 6 L O2 into ventilator. Uses albuterol q6 and mucomyst q6  Xopenex and  hypertonic saline nebs  Cxr 6/11 showed hypoinflation, peep increased to 15  Hypoinflation seen on 611 cxr appears improved on cxr 6/12  Wean peep to goal of 8 and maintain 24 hours prior to trach size change  ENT plan to increase trach size Monday, NPO midnight  Frequent suctioning  Chest PT  Intermittent episodes of hypoxia  ABG on admission showing elevated Pco2 at 54.2, normal PO2 at 102.5, bicarb elevated at 34.8  Status post bronchoscopy 6/12  Diagnosis: History pulmonary embolism  Hospitalized for right lower lobe segmental PE in August 2023, on eliquis since discharge  Transitioned to therapeutic Lovenox yesterday in preparation for trach upsizing on Monday     GI:   No acute issues, continue tube feeds     :   No acute issues  Limited urine output data, external urinary catheter does not fit properly  Limited urine output, continue to monitor  BUN and creatinine within normal limits, EGFR appears normal  Continue to monitor urine output today        F/E/N:   F: none  E: monitor and replete as needed  N: tube feeds: Jevity 1.5 continuous 70 cc/hr w/ 200 cc free water flushes q4h       Heme/Onc:   Leukocytosis in the setting of multifocal pneumonia  Continue to monitor WBC count on antibiotics  Hgb stable,no active signs of bleeding  Continue to monitor CBC  Platelets stable        Endo:   No acute problems  TSH and am cortisol wnl        ID:   Diagnosis: Ventilator associated multifocal pneumonia  Plan: Pseudomonas and serretia grown from bronchial washings  Patient status post 10 days antibiotics for treatment of ventilator associated pneumonia  No further plan for antibiotic treatment at this time  Chest x-ray 6/12 appears improved     Disposition: Critical care    ICU Core Measures     Vented Patient  VAP Bundle  VAP bundle ordered     A: Assess, Prevent, and Manage Pain Has pain been assessed? Yes  Need for changes to pain regimen? No   B: Both Spontaneous Awakening Trials (SATs) and Spontaneous  Breathing Trials (SBTs) Plan to perform spontaneous awakening trial today? Chronic vent   Plan to perform spontaneous breathing trial today? Chronic vent   Obvious barriers to extubation? NA   C: Choice of Sedation RASS Goal: 0 Alert and Calm  Need for changes to sedation or analgesia regimen? NA   D: Delirium CAM-ICU: Negative   E: Early Mobility  Plan for early mobility? Yes   F: Family Engagement Plan for family engagement today? Yes       Review of Invasive Devices:      Central access plan: none      Prophylaxis:  VTE VTE covered by:  enoxaparin, Subcutaneous, 120 mg at 06/14/24 2213       Stress Ulcer  not ordered        Significant 24hr Events     24hr events: Yesterday, patient was switched to therapeutic Lovenox in preparation for trach upsizing at the beginning of next week. Plan is to continue weaning PEEP down to a goal of 8. Patient is without acute complaints this morning.     Subjective     Review of Systems: See HPI for Review of Systems     Objective                            Vitals I/O      Most Recent Min/Max in 24hrs   Temp 98.8 °F (37.1 °C) Temp  Min: 97.3 °F (36.3 °C)  Max: 99.4 °F (37.4 °C)   Pulse 68 Pulse  Min: 61  Max: 77   Resp 16 Resp  Min: 12  Max: 24   /76 BP  Min: 97/56  Max: 148/81   O2 Sat 98 % SpO2  Min: 91 %  Max: 99 %      Intake/Output Summary (Last 24 hours) at 6/15/2024 0620  Last data filed at 6/15/2024 0503  Gross per 24 hour   Intake 2026 ml   Output 550 ml   Net 1476 ml       Diet Enteral/Parenteral; Tube Feeding No Oral Diet; Jevity 1.5; Continuous; 70; 200; Water; Every 4 hours  Diet NPO    Invasive Monitoring           Physical Exam   Physical Exam  Vitals reviewed.   Eyes:      Extraocular Movements: Extraocular movements intact.      Conjunctiva/sclera: Conjunctivae normal.      Pupils: Pupils are equal, round, and reactive to light.   Skin:     General: Skin is warm and dry.   HENT:      Head: Normocephalic and atraumatic.      Nose: No congestion or  rhinorrhea.      Mouth/Throat:      Mouth: Mucous membranes are moist.   Neck:      Trachea: Tracheostomy present.   Cardiovascular:      Rate and Rhythm: Normal rate and regular rhythm.   Musculoskeletal:      Right lower leg: No edema.      Left lower leg: No edema.   Abdominal: General: Bowel sounds are normal. There is abdominal type feeding tube.There is no distension.      Palpations: Abdomen is soft.      Tenderness: There is no abdominal tenderness.   Constitutional:       Appearance: He is well-nourished. He is obese.   Pulmonary:      Effort: Pulmonary effort is normal. No respiratory distress.      Breath sounds: Rhonchi (Bilateral) present.   Neurological:      Mental Status: He is alert. Mental status is at baseline. He is calm.      Comments: Shakes head in response to questioning   Genitourinary/Anorectal:  external catheter present.          Diagnostic Studies      EKG: No new  Imaging: I have personally reviewed pertinent reports.       Medications:  Scheduled PRN   chlorhexidine, 15 mL, Q12H ISAEL  enoxaparin, 1 mg/kg, Q12H ISAEL  FLUoxetine, 10 mg, Daily  guaiFENesin, 200 mg, Q6H  levalbuterol, 1.25 mg, Q6H  JANES ANTIFUNGAL, , BID  scopolamine, 1 patch, Q72H  sodium chloride, 4 mL, Q6H      acetaminophen, 650 mg, Q8H PRN  atropine, 0.5 mg, Once PRN       Continuous          Labs:    CBC    Recent Labs     06/14/24  0525   WBC 8.83   HGB 12.1   HCT 38.4        BMP    Recent Labs     06/14/24  0525   SODIUM 137   K 4.3   CL 93*   CO2 35*   AGAP 9   BUN 13   CREATININE 0.39*   CALCIUM 9.3       Coags    No recent results     Additional Electrolytes  No recent results       Blood Gas    No recent results  No recent results LFTs  No recent results    Infectious  No recent results  Glucose  Recent Labs     06/14/24  0525   GLUC 96               Manny Evans MD

## 2024-06-15 NOTE — PLAN OF CARE
Problem: Prexisting or High Potential for Compromised Skin Integrity  Goal: Skin integrity is maintained or improved  Description: INTERVENTIONS:  - Identify patients at risk for skin breakdown  - Assess and monitor skin integrity  - Assess and monitor nutrition and hydration status  - Monitor labs   - Assess for incontinence   - Turn and reposition patient  - Assist with mobility/ambulation  - Relieve pressure over bony prominences  - Avoid friction and shearing  - Provide appropriate hygiene as needed including keeping skin clean and dry  - Evaluate need for skin moisturizer/barrier cream  - Collaborate with interdisciplinary team   - Patient/family teaching  - Consider wound care consult   Outcome: Progressing     Problem: PAIN - ADULT  Goal: Verbalizes/displays adequate comfort level or baseline comfort level  Description: Interventions:  - Encourage patient to monitor pain and request assistance  - Assess pain using appropriate pain scale  - Administer analgesics based on type and severity of pain and evaluate response  - Implement non-pharmacological measures as appropriate and evaluate response  - Consider cultural and social influences on pain and pain management  - Notify physician/advanced practitioner if interventions unsuccessful or patient reports new pain  Outcome: Progressing     Problem: INFECTION - ADULT  Goal: Absence or prevention of progression during hospitalization  Description: INTERVENTIONS:  - Assess and monitor for signs and symptoms of infection  - Monitor lab/diagnostic results  - Monitor all insertion sites, i.e. indwelling lines, tubes, and drains  - Monitor endotracheal if appropriate and nasal secretions for changes in amount and color  - [place] appropriate cooling/warming therapies per order  - Administer medications as ordered  - Instruct and encourage patient and family to use good hand hygiene technique  - Identify and instruct in appropriate isolation precautions for  identified infection/condition  Outcome: Progressing  Goal: Absence of fever/infection during neutropenic period  Description: INTERVENTIONS:  - Monitor WBC    Outcome: Progressing     Problem: SAFETY ADULT  Goal: Patient will remain free of falls  Description: INTERVENTIONS:  - Educate patient/family on patient safety including physical limitations  - Instruct patient to call for assistance with activity   - Consult OT/PT to assist with strengthening/mobility   - Keep Call bell within reach  - Keep bed low and locked with side rails adjusted as appropriate  - Keep care items and personal belongings within reach  - Initiate and maintain comfort rounds  - Make Fall Risk Sign visible to staff  - Offer Toileting every 2 Hours, in advance of need  - Initiate/Maintain alarm  - Obtain necessary fall risk management equipment  - Apply yellow socks and bracelet for high fall risk patients  - Consider moving patient to room near nurses station  Outcome: Progressing  Goal: Maintain or return to baseline ADL function  Description: INTERVENTIONS:  -  Assess patient's ability to carry out ADLs; assess patient's baseline for ADL function and identify physical deficits which impact ability to perform ADLs (bathing, care of mouth/teeth, toileting, grooming, dressing, etc.)  - Assess/evaluate cause of self-care deficits   - Assess range of motion  - Assess patient's mobility; develop plan if impaired  - Assess patient's need for assistive devices and provide as appropriate  - Encourage maximum independence but intervene and supervise when necessary  - Involve family in performance of ADLs  - Assess for home care needs following discharge   - Consider OT consult to assist with ADL evaluation and planning for discharge  - Provide patient education as appropriate  Outcome: Progressing  Goal: Maintains/Returns to pre admission functional level  Description: INTERVENTIONS:  - Perform AM-PAC 6 Click Basic Mobility/ Daily Activity  assessment daily.  - Set and communicate daily mobility goal to care team and patient/family/caregiver.   - Collaborate with rehabilitation services on mobility goals if consulted  - Perform Range of Motion 3 times a day.  - Reposition patient every 2 hours.  - Dangle patient 3 times a day  - Stand patient 3 times a day  - Ambulate patient 3 times a day  - Out of bed to chair 3 times a day   - Out of bed for meals 3 times a day  - Out of bed for toileting  - Record patient progress and toleration of activity level   Outcome: Progressing     Problem: DISCHARGE PLANNING  Goal: Discharge to home or other facility with appropriate resources  Description: INTERVENTIONS:  - Identify barriers to discharge w/patient and caregiver  - Arrange for needed discharge resources and transportation as appropriate  - Identify discharge learning needs (meds, wound care, etc.)  - Arrange for interpretive services to assist at discharge as needed  - Refer to Case Management Department for coordinating discharge planning if the patient needs post-hospital services based on physician/advanced practitioner order or complex needs related to functional status, cognitive ability, or social support system  Outcome: Progressing     Problem: Knowledge Deficit  Goal: Patient/family/caregiver demonstrates understanding of disease process, treatment plan, medications, and discharge instructions  Description: Complete learning assessment and assess knowledge base.  Interventions:  - Provide teaching at level of understanding  - Provide teaching via preferred learning methods  Outcome: Progressing     Problem: Nutrition/Hydration-ADULT  Goal: Nutrient/Hydration intake appropriate for improving, restoring or maintaining nutritional needs  Description: Monitor and assess patient's nutrition/hydration status for malnutrition. Collaborate with interdisciplinary team and initiate plan and interventions as ordered.  Monitor patient's weight and dietary  intake as ordered or per policy. Utilize nutrition screening tool and intervene as necessary. Determine patient's food preferences and provide high-protein, high-caloric foods as appropriate.     INTERVENTIONS:  - Monitor oral intake, urinary output, labs, and treatment plans  - Assess nutrition and hydration status and recommend course of action  - Evaluate amount of meals eaten  - Assist patient with eating if necessary   - Allow adequate time for meals  - Recommend/ encourage appropriate diets, oral nutritional supplements, and vitamin/mineral supplements  - Order, calculate, and assess calorie counts as needed  - Recommend, monitor, and adjust tube feedings and TPN/PPN based on assessed needs  - Assess need for intravenous fluids  - Provide specific nutrition/hydration education as appropriate  - Include patient/family/caregiver in decisions related to nutrition  Outcome: Progressing

## 2024-06-15 NOTE — RESPIRATORY THERAPY NOTE
Resp care   06/15/24 0726   Respiratory Assessment   Assessment Type During-treatment   General Appearance Awake   Respiratory Pattern Assisted   Chest Assessment Chest expansion symmetrical   Bilateral Breath Sounds Coarse;Diminished   Suction Trach   Resp Comments pt found on scmv, rr12, tv500, peep 10, 40% fio2 spo2 is 100%, pt titrated to 8 of peep, spo2 is 99%, bs are coarse, udn tx given via aerogen, will cont to monitor per resp protocol.   Vent Information   Vent ID [id number]   Vent type Hook C3   Hook Vent Mode (S)CMV   $ Pulse Oximetry Spot Check Charge Completed   (S)CMV Settings   Resp Rate (BPM) 12 BPM   VT (mL) 500 mL   FIO2 (%) 40 %   PEEP (cmH2O) 8 cmH2O   I:E Ratio 1/4.6   Insp Time (%) 0.9 %   Flow Trigger (LPM) 3   Humidification Heater   Heater Temperature (Set) 87.8 °F (31 °C)   (S)CMV Actuals   Resp Rate (BPM) 14 BPM   VT (mL) 600   MV 7   MAP (cmH2O) 12 cmH2O   Peak Pressure (cmH2O) 36 cmH2O   I:E Ratio (Obs) 1/4.6   Insp Resistance 32   Heater Temperature (Obs) 87.8 °F (31 °C)   Static Compliance (mL/cmH20) 30.3 mL/cmH2O   Plateau Pressure (cm H2O) 20.7 cm H2O   (S)CMV Alarms   High Peak Pressure (cmH2O) 45   Low Pressure (cmH2O) 5 cm H2O   High Resp Rate (BPM) 40 BPM   Low Resp Rate (BPM) 8 BPM   High MV (L/min) 20 L/min   Low MV (L/min) 2.3 L/min   High VT (mL) 1000 mL   Low VT (mL) 250 mL   Apnea Time (s) 20 S   Maintenance   Alarm (pink) cable attached No   Resuscitation bag with peep valve at bedside Yes   Water bag changed No   Circuit changed No   Daily Screen   Patient safety screen outcome: Failed   Not Ready for Weaning due to: PEEP > 8cmH2O   IHI Ventilator Associated Pneumonia Bundle   Daily Awakening Trials Performed Yes   Daily Assessment of Readiness to Extubate Yes   Head of Bed Elevated HOB 30   Surgical Airway Distal;Cuffed;Other (Comment)   Placement Date/Time: 06/01/24 1155   Tube Size: 6  Placed By: Physician  Placed by (Name): Dr. Ronquillo  Type: Tracheostomy   Style: (c) Distal;Cuffed;Other (Comment)   Status Cuff Inflated;Secured   Site Assessment Clean;Dry   Ties Assessment Clean;Dry;Intact;Secure   Surgical Airway Cuff Pressure (color) Green   Equipment at bedside BVM;Wall Suction setup;Additional complete same size trach tube;Additional complete one size smaller trach tube;Obturator;Sterile saline;Additional inner cannula   [REMOVED] Surgical Airway Shiley Cuffed   Removal Date/Time: 06/01/24 1150  Placement Date/Time: 12/11/23 0850   Tube Size: 6  Placed By: (c) Other (Comment)  Type: Tracheostomy  Brand: [employer]  Style: Cuffed   Surgical Airway Cuff Pressure (color) Green

## 2024-06-15 NOTE — PLAN OF CARE
Problem: Prexisting or High Potential for Compromised Skin Integrity  Goal: Skin integrity is maintained or improved  Description: INTERVENTIONS:  - Identify patients at risk for skin breakdown  - Assess and monitor skin integrity  - Assess and monitor nutrition and hydration status  - Monitor labs   - Assess for incontinence   - Turn and reposition patient  - Assist with mobility/ambulation  - Relieve pressure over bony prominences  - Avoid friction and shearing  - Provide appropriate hygiene as needed including keeping skin clean and dry  - Evaluate need for skin moisturizer/barrier cream  - Collaborate with interdisciplinary team   - Patient/family teaching  - Consider wound care consult   Outcome: Progressing     Problem: PAIN - ADULT  Goal: Verbalizes/displays adequate comfort level or baseline comfort level  Description: Interventions:  - Encourage patient to monitor pain and request assistance  - Assess pain using appropriate pain scale  - Administer analgesics based on type and severity of pain and evaluate response  - Implement non-pharmacological measures as appropriate and evaluate response  - Consider cultural and social influences on pain and pain management  - Notify physician/advanced practitioner if interventions unsuccessful or patient reports new pain  Outcome: Progressing     Problem: INFECTION - ADULT  Goal: Absence or prevention of progression during hospitalization  Description: INTERVENTIONS:  - Assess and monitor for signs and symptoms of infection  - Monitor lab/diagnostic results  - Monitor all insertion sites, i.e. indwelling lines, tubes, and drains  - Monitor endotracheal if appropriate and nasal secretions for changes in amount and color  - [place] appropriate cooling/warming therapies per order  - Administer medications as ordered  - Instruct and encourage patient and family to use good hand hygiene technique  - Identify and instruct in appropriate isolation precautions for  identified infection/condition  Outcome: Progressing  Goal: Absence of fever/infection during neutropenic period  Description: INTERVENTIONS:  - Monitor WBC    Outcome: Progressing     Problem: SAFETY ADULT  Goal: Patient will remain free of falls  Description: INTERVENTIONS:  - Educate patient/family on patient safety including physical limitations  - Instruct patient to call for assistance with activity   - Consult OT/PT to assist with strengthening/mobility   - Keep Call bell within reach  - Keep bed low and locked with side rails adjusted as appropriate  - Keep care items and personal belongings within reach  - Initiate and maintain comfort rounds  - Make Fall Risk Sign visible to staff  - Apply yellow socks and bracelet for high fall risk patients  - Consider moving patient to room near nurses station  Outcome: Progressing  Goal: Maintain or return to baseline ADL function  Description: INTERVENTIONS:  -  Assess patient's ability to carry out ADLs; assess patient's baseline for ADL function and identify physical deficits which impact ability to perform ADLs (bathing, care of mouth/teeth, toileting, grooming, dressing, etc.)  - Assess/evaluate cause of self-care deficits   - Assess range of motion  - Assess patient's mobility; develop plan if impaired  - Assess patient's need for assistive devices and provide as appropriate  - Encourage maximum independence but intervene and supervise when necessary  - Involve family in performance of ADLs  - Assess for home care needs following discharge   - Consider OT consult to assist with ADL evaluation and planning for discharge  - Provide patient education as appropriate  Outcome: Progressing  Goal: Maintains/Returns to pre admission functional level  Description: INTERVENTIONS:  - Perform AM-PAC 6 Click Basic Mobility/ Daily Activity assessment daily.  - Set and communicate daily mobility goal to care team and patient/family/caregiver.   - Collaborate with rehabilitation  services on mobility goals if consulted  - Out of bed for toileting  - Record patient progress and toleration of activity level   Outcome: Progressing     Problem: DISCHARGE PLANNING  Goal: Discharge to home or other facility with appropriate resources  Description: INTERVENTIONS:  - Identify barriers to discharge w/patient and caregiver  - Arrange for needed discharge resources and transportation as appropriate  - Identify discharge learning needs (meds, wound care, etc.)  - Arrange for interpretive services to assist at discharge as needed  - Refer to Case Management Department for coordinating discharge planning if the patient needs post-hospital services based on physician/advanced practitioner order or complex needs related to functional status, cognitive ability, or social support system  Outcome: Progressing     Problem: Knowledge Deficit  Goal: Patient/family/caregiver demonstrates understanding of disease process, treatment plan, medications, and discharge instructions  Description: Complete learning assessment and assess knowledge base.  Interventions:  - Provide teaching at level of understanding  - Provide teaching via preferred learning methods  Outcome: Progressing     Problem: Nutrition/Hydration-ADULT  Goal: Nutrient/Hydration intake appropriate for improving, restoring or maintaining nutritional needs  Description: Monitor and assess patient's nutrition/hydration status for malnutrition. Collaborate with interdisciplinary team and initiate plan and interventions as ordered.  Monitor patient's weight and dietary intake as ordered or per policy. Utilize nutrition screening tool and intervene as necessary. Determine patient's food preferences and provide high-protein, high-caloric foods as appropriate.     INTERVENTIONS:  - Monitor oral intake, urinary output, labs, and treatment plans  - Assess nutrition and hydration status and recommend course of action  - Evaluate amount of meals eaten  - Assist  patient with eating if necessary   - Allow adequate time for meals  - Recommend/ encourage appropriate diets, oral nutritional supplements, and vitamin/mineral supplements  - Order, calculate, and assess calorie counts as needed  - Recommend, monitor, and adjust tube feedings and TPN/PPN based on assessed needs  - Assess need for intravenous fluids  - Provide specific nutrition/hydration education as appropriate  - Include patient/family/caregiver in decisions related to nutrition  Outcome: Progressing

## 2024-06-16 ENCOUNTER — APPOINTMENT (INPATIENT)
Dept: GASTROENTEROLOGY | Facility: HOSPITAL | Age: 39
DRG: 720 | End: 2024-06-16
Payer: COMMERCIAL

## 2024-06-16 ENCOUNTER — APPOINTMENT (OUTPATIENT)
Dept: RADIOLOGY | Facility: HOSPITAL | Age: 39
DRG: 720 | End: 2024-06-16
Payer: COMMERCIAL

## 2024-06-16 LAB
ANION GAP SERPL CALCULATED.3IONS-SCNC: 3 MMOL/L (ref 4–13)
BASOPHILS # BLD AUTO: 0.05 THOUSANDS/ÂΜL (ref 0–0.1)
BASOPHILS NFR BLD AUTO: 1 % (ref 0–1)
BUN SERPL-MCNC: 12 MG/DL (ref 5–25)
CALCIUM SERPL-MCNC: 9 MG/DL (ref 8.4–10.2)
CHLORIDE SERPL-SCNC: 94 MMOL/L (ref 96–108)
CO2 SERPL-SCNC: 39 MMOL/L (ref 21–32)
CREAT SERPL-MCNC: 0.39 MG/DL (ref 0.6–1.3)
EOSINOPHIL # BLD AUTO: 0.15 THOUSAND/ÂΜL (ref 0–0.61)
EOSINOPHIL NFR BLD AUTO: 2 % (ref 0–6)
ERYTHROCYTE [DISTWIDTH] IN BLOOD BY AUTOMATED COUNT: 15.4 % (ref 11.6–15.1)
GFR SERPL CREATININE-BSD FRML MDRD: 153 ML/MIN/1.73SQ M
GLUCOSE SERPL-MCNC: 103 MG/DL (ref 65–140)
HCT VFR BLD AUTO: 33.8 % (ref 36.5–49.3)
HGB BLD-MCNC: 10.9 G/DL (ref 12–17)
IMM GRANULOCYTES # BLD AUTO: 0.06 THOUSAND/UL (ref 0–0.2)
IMM GRANULOCYTES NFR BLD AUTO: 1 % (ref 0–2)
INR PPP: 1.2 (ref 0.84–1.19)
LYMPHOCYTES # BLD AUTO: 1.8 THOUSANDS/ÂΜL (ref 0.6–4.47)
LYMPHOCYTES NFR BLD AUTO: 24 % (ref 14–44)
MAGNESIUM SERPL-MCNC: 2 MG/DL (ref 1.9–2.7)
MCH RBC QN AUTO: 30 PG (ref 26.8–34.3)
MCHC RBC AUTO-ENTMCNC: 32.2 G/DL (ref 31.4–37.4)
MCV RBC AUTO: 93 FL (ref 82–98)
MONOCYTES # BLD AUTO: 0.57 THOUSAND/ÂΜL (ref 0.17–1.22)
MONOCYTES NFR BLD AUTO: 8 % (ref 4–12)
NEUTROPHILS # BLD AUTO: 4.92 THOUSANDS/ÂΜL (ref 1.85–7.62)
NEUTS SEG NFR BLD AUTO: 64 % (ref 43–75)
NRBC BLD AUTO-RTO: 0 /100 WBCS
PHOSPHATE SERPL-MCNC: 3.2 MG/DL (ref 2.7–4.5)
PLATELET # BLD AUTO: 219 THOUSANDS/UL (ref 149–390)
PMV BLD AUTO: 10 FL (ref 8.9–12.7)
POTASSIUM SERPL-SCNC: 3.5 MMOL/L (ref 3.5–5.3)
PROTHROMBIN TIME: 15.1 SECONDS (ref 11.6–14.5)
RBC # BLD AUTO: 3.63 MILLION/UL (ref 3.88–5.62)
SODIUM SERPL-SCNC: 136 MMOL/L (ref 135–147)
WBC # BLD AUTO: 7.55 THOUSAND/UL (ref 4.31–10.16)

## 2024-06-16 PROCEDURE — 0B968ZZ DRAINAGE OF RIGHT LOWER LOBE BRONCHUS, VIA NATURAL OR ARTIFICIAL OPENING ENDOSCOPIC: ICD-10-PCS | Performed by: INTERNAL MEDICINE

## 2024-06-16 PROCEDURE — 70553 MRI BRAIN STEM W/O & W/DYE: CPT

## 2024-06-16 PROCEDURE — 83735 ASSAY OF MAGNESIUM: CPT | Performed by: STUDENT IN AN ORGANIZED HEALTH CARE EDUCATION/TRAINING PROGRAM

## 2024-06-16 PROCEDURE — 85610 PROTHROMBIN TIME: CPT | Performed by: STUDENT IN AN ORGANIZED HEALTH CARE EDUCATION/TRAINING PROGRAM

## 2024-06-16 PROCEDURE — 0B988ZZ DRAINAGE OF LEFT UPPER LOBE BRONCHUS, VIA NATURAL OR ARTIFICIAL OPENING ENDOSCOPIC: ICD-10-PCS | Performed by: INTERNAL MEDICINE

## 2024-06-16 PROCEDURE — A9585 GADOBUTROL INJECTION: HCPCS | Performed by: INTERNAL MEDICINE

## 2024-06-16 PROCEDURE — 94760 N-INVAS EAR/PLS OXIMETRY 1: CPT

## 2024-06-16 PROCEDURE — 94669 MECHANICAL CHEST WALL OSCILL: CPT

## 2024-06-16 PROCEDURE — 94003 VENT MGMT INPAT SUBQ DAY: CPT

## 2024-06-16 PROCEDURE — 0B9B8ZZ DRAINAGE OF LEFT LOWER LOBE BRONCHUS, VIA NATURAL OR ARTIFICIAL OPENING ENDOSCOPIC: ICD-10-PCS | Performed by: INTERNAL MEDICINE

## 2024-06-16 PROCEDURE — 85025 COMPLETE CBC W/AUTO DIFF WBC: CPT | Performed by: STUDENT IN AN ORGANIZED HEALTH CARE EDUCATION/TRAINING PROGRAM

## 2024-06-16 PROCEDURE — 94640 AIRWAY INHALATION TREATMENT: CPT

## 2024-06-16 PROCEDURE — 84100 ASSAY OF PHOSPHORUS: CPT | Performed by: STUDENT IN AN ORGANIZED HEALTH CARE EDUCATION/TRAINING PROGRAM

## 2024-06-16 PROCEDURE — 80048 BASIC METABOLIC PNL TOTAL CA: CPT | Performed by: STUDENT IN AN ORGANIZED HEALTH CARE EDUCATION/TRAINING PROGRAM

## 2024-06-16 PROCEDURE — 31622 DX BRONCHOSCOPE/WASH: CPT | Performed by: INTERNAL MEDICINE

## 2024-06-16 PROCEDURE — 94664 DEMO&/EVAL PT USE INHALER: CPT

## 2024-06-16 PROCEDURE — 99291 CRITICAL CARE FIRST HOUR: CPT | Performed by: INTERNAL MEDICINE

## 2024-06-16 PROCEDURE — 94668 MNPJ CHEST WALL SBSQ: CPT

## 2024-06-16 RX ORDER — MIDAZOLAM HYDROCHLORIDE 2 MG/2ML
4 INJECTION, SOLUTION INTRAMUSCULAR; INTRAVENOUS ONCE
Status: COMPLETED | OUTPATIENT
Start: 2024-06-16 | End: 2024-06-16

## 2024-06-16 RX ORDER — FENTANYL CITRATE 50 UG/ML
100 INJECTION, SOLUTION INTRAMUSCULAR; INTRAVENOUS ONCE
Status: COMPLETED | OUTPATIENT
Start: 2024-06-16 | End: 2024-06-16

## 2024-06-16 RX ORDER — POTASSIUM CHLORIDE 20MEQ/15ML
40 LIQUID (ML) ORAL ONCE
Status: COMPLETED | OUTPATIENT
Start: 2024-06-16 | End: 2024-06-16

## 2024-06-16 RX ORDER — PROPOFOL 10 MG/ML
200 INJECTION, EMULSION INTRAVENOUS ONCE
Status: DISCONTINUED | OUTPATIENT
Start: 2024-06-16 | End: 2024-06-16

## 2024-06-16 RX ORDER — LIDOCAINE HYDROCHLORIDE 10 MG/ML
INJECTION, SOLUTION EPIDURAL; INFILTRATION; INTRACAUDAL; PERINEURAL
Status: COMPLETED
Start: 2024-06-16 | End: 2024-06-16

## 2024-06-16 RX ORDER — GADOBUTROL 604.72 MG/ML
11 INJECTION INTRAVENOUS
Status: COMPLETED | OUTPATIENT
Start: 2024-06-16 | End: 2024-06-16

## 2024-06-16 RX ORDER — FENTANYL CITRATE/PF 50 MCG/ML
SYRINGE (ML) INJECTION
Status: DISCONTINUED
Start: 2024-06-16 | End: 2024-06-16 | Stop reason: WASHOUT

## 2024-06-16 RX ADMIN — MICONAZOLE NITRATE: 20 CREAM TOPICAL at 10:00

## 2024-06-16 RX ADMIN — LEVALBUTEROL HYDROCHLORIDE 1.25 MG: 1.25 SOLUTION RESPIRATORY (INHALATION) at 19:35

## 2024-06-16 RX ADMIN — LIDOCAINE HYDROCHLORIDE 300 MG: 10 INJECTION, SOLUTION EPIDURAL; INFILTRATION; INTRACAUDAL; PERINEURAL at 18:11

## 2024-06-16 RX ADMIN — LEVALBUTEROL HYDROCHLORIDE 1.25 MG: 1.25 SOLUTION RESPIRATORY (INHALATION) at 08:01

## 2024-06-16 RX ADMIN — MIDAZOLAM 4 MG: 1 INJECTION INTRAMUSCULAR; INTRAVENOUS at 15:12

## 2024-06-16 RX ADMIN — FENTANYL CITRATE 100 MCG: 50 INJECTION INTRAMUSCULAR; INTRAVENOUS at 15:12

## 2024-06-16 RX ADMIN — GADOBUTROL 11 ML: 604.72 INJECTION INTRAVENOUS at 01:33

## 2024-06-16 RX ADMIN — ENOXAPARIN SODIUM 120 MG: 120 INJECTION SUBCUTANEOUS at 19:49

## 2024-06-16 RX ADMIN — LEVALBUTEROL HYDROCHLORIDE 1.25 MG: 1.25 SOLUTION RESPIRATORY (INHALATION) at 02:08

## 2024-06-16 RX ADMIN — SODIUM CHLORIDE SOLN NEBU 3% 4 ML: 3 NEBU SOLN at 19:35

## 2024-06-16 RX ADMIN — GUAIFENESIN 200 MG: 100 SOLUTION ORAL at 15:14

## 2024-06-16 RX ADMIN — ENOXAPARIN SODIUM 120 MG: 120 INJECTION SUBCUTANEOUS at 10:00

## 2024-06-16 RX ADMIN — GUAIFENESIN 200 MG: 100 SOLUTION ORAL at 02:18

## 2024-06-16 RX ADMIN — CHLORHEXIDINE GLUCONATE 0.12% ORAL RINSE 15 ML: 1.2 LIQUID ORAL at 19:49

## 2024-06-16 RX ADMIN — SODIUM CHLORIDE SOLN NEBU 3% 4 ML: 3 NEBU SOLN at 02:08

## 2024-06-16 RX ADMIN — SODIUM CHLORIDE SOLN NEBU 3% 4 ML: 3 NEBU SOLN at 08:01

## 2024-06-16 RX ADMIN — FLUOXETINE HYDROCHLORIDE 10 MG: 10 CAPSULE ORAL at 10:00

## 2024-06-16 RX ADMIN — SODIUM CHLORIDE SOLN NEBU 3% 4 ML: 3 NEBU SOLN at 13:24

## 2024-06-16 RX ADMIN — MICONAZOLE NITRATE: 20 CREAM TOPICAL at 17:57

## 2024-06-16 RX ADMIN — GUAIFENESIN 200 MG: 100 SOLUTION ORAL at 19:49

## 2024-06-16 RX ADMIN — POTASSIUM CHLORIDE 40 MEQ: 1.5 SOLUTION ORAL at 20:53

## 2024-06-16 RX ADMIN — CHLORHEXIDINE GLUCONATE 0.12% ORAL RINSE 15 ML: 1.2 LIQUID ORAL at 10:00

## 2024-06-16 RX ADMIN — GUAIFENESIN 200 MG: 100 SOLUTION ORAL at 10:00

## 2024-06-16 RX ADMIN — LEVALBUTEROL HYDROCHLORIDE 1.25 MG: 1.25 SOLUTION RESPIRATORY (INHALATION) at 13:24

## 2024-06-16 NOTE — QUICK NOTE
MRI brain w/wo contrast negative for acute infarct.    No further inpatient Neuro recommendations.  He will not need follow up with the outpatient Neurology team.

## 2024-06-16 NOTE — QUICK NOTE
Called and updated patient's sister Dmitry regarding patient's current progress and plan with upcoming tracheostomy upsizing tomorrow. She has no questions at this time.

## 2024-06-16 NOTE — PROGRESS NOTES
[facility]  Progress Note: Critical Care  Name: Hemanth Swanson 39 y.o. male I MRN: [id number]  Unit/Bed#: MICU 05 I Date of Admission: 6/10/2024   Date of Service: 6/16/2024 I Hospital Day: 6    Assessment & Plan   Seizure-like activity  S/p cardiac arrest  Ventilator associated pneumonia  Neuromuscular disorder  Testicular seminoma, s/p ochiectomy/chemotherapy  Tracheostomy and peg dependence  History of pulmonary embolism on Eliquis  Possible CROW     Neuro:   Diagnosis: Seizure-like activity  EEG discontinued  Keppra discontinued  Brain MRI: no acute intracranial pathology  Neurology following, appreciate recommendations  Diagnosis: Neuromuscular disorder  Patient has completed both outpatient and inpatient workup without any definitive diagnosis  Possible seronegative myasthenia variation due to chemo vs anoxic brain injury  No oral diet for now  Neurology following, appreciate recommendations  Diagnosis: Altered mental status   In the setting of hypoxia and bradycardia, patient otherwise alert and oriented  Lactic and ammonia obtained on arrival wnl  Plan:   Delirium precautions  Monitor oxygen saturation  Maintain sleep wake cycle     CV:   Diagnosis: Bradycardia  Plan: Patient continues with episodes of bradycardia, clinically suspect due to mucous plugging causing hypoxia   Plan:  Cardiac monitoring  Atropine prn  Diagnosis: s/p cardiac arrest with ROSC  Patient had episode of pulseless asystole without hypoxia, ROSC obtained after 2 rounds ACLS with clearance of secretions on 5/31  Echo 6/11 showing EF of 65%  Plan:  Continuous cardiac monitoring  Frequent suctioning  Diagnosis: HFpEF  Plan:   Echo 6/11 showing EF of 65%  Echo in 8/23 showing EF 60%  Daily weights  Strict Is and Os        Pulm:  Diagnosis: Ventilator associated multifocal pneumonia  Plan: Home Trilogy ventilator settings AC/VC /RR 18/Peep 6 + 6 L O2 into ventilator. Uses albuterol q6 and mucomyst  q6  Xopenex and hypertonic saline nebs  Cxr 6/11 showed hypoinflation, peep increased to 15  Hypoinflation seen on 611 cxr appears improved on cxr 6/12  Wean peep to goal of 8 and maintain 24 hours prior to trach size change  ENT plan to increase trach size Monday, NPO midnight  Frequent suctioning  Chest PT  Intermittent episodes of hypoxia  ABG on admission showing elevated PCO2 at 54.2, normal PO2 at 102.5, bicarb elevated at 34.8  Status post bronchoscopy 6/12  Diagnosis: History pulmonary embolism  Hospitalized for right lower lobe segmental PE in August 2023, on eliquis since discharge  Transitioned to therapeutic Lovenox 6/14 in preparation for trach upsizing on Monday     GI:   No acute issues, continue tube feeds. NPO midnight for trach upsizing     :   No acute issues  Limited urine output data, external urinary catheter does not fit properly  Limited urine output, continue to monitor  BUN and creatinine within normal limits, EGFR appears normal  Continue to monitor urine output        F/E/N:   F: none  E: monitor and replete as needed  N: tube feeds: Jevity 1.5 continuous 70 cc/hr w/ 200 cc free water flushes q4h       Heme/Onc:   Leukocytosis in the setting of multifocal pneumonia  Platelets stable  Hgb stable, no active signs of bleeding  Continue to monitor CBC     Endo:   No acute problems  TSH and am cortisol wnl     ID:   Diagnosis: Ventilator associated multifocal pneumonia  Plan:   Pseudomonas and serretia grown from bronchial washings  Patient status post 10 days antibiotics for treatment of ventilator associated pneumonia  No further plan for antibiotic treatment at this time  Chest x-ray 6/12 appears improved     Disposition: Critical care    ICU Core Measures     Vented Patient  VAP Bundle  VAP bundle ordered     A: Assess, Prevent, and Manage Pain Has pain been assessed? Yes  Need for changes to pain regimen? No   B: Both Spontaneous Awakening Trials (SATs) and Spontaneous Breathing Trials  (SBTs) Plan to perform spontaneous awakening trial today? Chronic vent   Plan to perform spontaneous breathing trial today? Chronic vent   Obvious barriers to extubation? NA   C: Choice of Sedation RASS Goal: N/A patient not on sedation  Need for changes to sedation or analgesia regimen? No   D: Delirium CAM-ICU: Negative   E: Early Mobility  Plan for early mobility? Yes   F: Family Engagement Plan for family engagement today? Yes       Review of Invasive Devices:      Central access plan: none    Prophylaxis:  VTE VTE covered by:  enoxaparin, Subcutaneous, 120 mg at 06/15/24 2115       Stress Ulcer  not ordered        Significant 24hr Events     24hr events: N/A     Subjective   Patient seen this morning at bedside lying comfortably. He was easily awoken and alert, following commands and answering questions with nodding or shaking his head.     Review of Systems: Review of Systems   Constitutional:  Negative for chills and fever.   Respiratory:  Negative for shortness of breath.    Cardiovascular:  Negative for chest pain and palpitations.   Gastrointestinal:  Negative for abdominal pain.        Objective                            Vitals I/O      Most Recent Min/Max in 24hrs   Temp 98.1 °F (36.7 °C) Temp  Min: 97.6 °F (36.4 °C)  Max: 98.7 °F (37.1 °C)   Pulse 76 Pulse  Min: 63  Max: 76   Resp 14 Resp  Min: 12  Max: 20   /77 BP  Min: 94/59  Max: 163/83   O2 Sat 99 % SpO2  Min: 91 %  Max: 100 %      Intake/Output Summary (Last 24 hours) at 6/16/2024 0742  Last data filed at 6/16/2024 0650  Gross per 24 hour   Intake 2660 ml   Output 940 ml   Net 1720 ml       Diet Enteral/Parenteral; Tube Feeding No Oral Diet; Jevity 1.5; Continuous; 70; 200; Water; Every 4 hours  Diet NPO    Invasive Monitoring   N/A        Physical Exam   Physical Exam  Vitals reviewed.   Eyes:      General:         Right eye: No discharge.         Left eye: No discharge.   Skin:     General: Skin is warm and dry.   HENT:      Head:  Normocephalic.      Right Ear: No drainage.      Left Ear: No drainage.      Nose: No congestion.      Mouth/Throat:      Mouth: Mucous membranes are moist.   Neck:      Trachea: Tracheostomy present.   Cardiovascular:      Rate and Rhythm: Normal rate and regular rhythm.      Heart sounds: Normal heart sounds.   Musculoskeletal:      Right lower leg: No edema.      Left lower leg: No edema.   Abdominal: General: Bowel sounds are normal.      Palpations: Abdomen is soft.      Tenderness: There is no abdominal tenderness.   Constitutional:       General: He is not in acute distress.     Interventions: He is not sedated.  Pulmonary:      Effort: Pulmonary effort is normal.      Breath sounds: Rhonchi present.      Comments: Diffuse bilateral rhonchi  Neurological:      Mental Status: He is alert. Mental status is at baseline. He is calm.   Genitourinary/Anorectal:     Comments: Feeding tube in place  external catheter present.          Diagnostic Studies      EKG: No new EKG  Imaging:  I have personally reviewed pertinent reports.       Medications:  Scheduled PRN   chlorhexidine, 15 mL, Q12H ISAEL  enoxaparin, 1 mg/kg, Q12H ISAEL  fentaNYL, 100 mcg, Once  FLUoxetine, 10 mg, Daily  guaiFENesin, 200 mg, Q6H  levalbuterol, 1.25 mg, Q6H  midazolam, 4 mg, Once  JANES ANTIFUNGAL, , BID  scopolamine, 1 patch, Q72H  sodium chloride, 4 mL, Q6H      acetaminophen, 650 mg, Q8H PRN  albuterol, 2.5 mg, Q4H PRN  atropine, 0.5 mg, Once PRN       Continuous          Labs:    CBC    No recent results  BMP    No recent results    Coags    No recent results     Additional Electrolytes  No recent results       Blood Gas    No recent results  No recent results LFTs  No recent results    Infectious  No recent results  Glucose  No recent results            Yojana Bravo, MS4  [facility]

## 2024-06-16 NOTE — PLAN OF CARE
Problem: Prexisting or High Potential for Compromised Skin Integrity  Goal: Skin integrity is maintained or improved  Description: INTERVENTIONS:  - Identify patients at risk for skin breakdown  - Assess and monitor skin integrity  - Assess and monitor nutrition and hydration status  - Monitor labs   - Assess for incontinence   - Turn and reposition patient  - Assist with mobility/ambulation  - Relieve pressure over bony prominences  - Avoid friction and shearing  - Provide appropriate hygiene as needed including keeping skin clean and dry  - Evaluate need for skin moisturizer/barrier cream  - Collaborate with interdisciplinary team   - Patient/family teaching  - Consider wound care consult   Outcome: Progressing     Problem: PAIN - ADULT  Goal: Verbalizes/displays adequate comfort level or baseline comfort level  Description: Interventions:  - Encourage patient to monitor pain and request assistance  - Assess pain using appropriate pain scale  - Administer analgesics based on type and severity of pain and evaluate response  - Implement non-pharmacological measures as appropriate and evaluate response  - Consider cultural and social influences on pain and pain management  - Notify physician/advanced practitioner if interventions unsuccessful or patient reports new pain  Outcome: Progressing     Problem: INFECTION - ADULT  Goal: Absence or prevention of progression during hospitalization  Description: INTERVENTIONS:  - Assess and monitor for signs and symptoms of infection  - Monitor lab/diagnostic results  - Monitor all insertion sites, i.e. indwelling lines, tubes, and drains  - Monitor endotracheal if appropriate and nasal secretions for changes in amount and color  - [place] appropriate cooling/warming therapies per order  - Administer medications as ordered  - Instruct and encourage patient and family to use good hand hygiene technique  - Identify and instruct in appropriate isolation precautions for  identified infection/condition  Outcome: Progressing  Goal: Absence of fever/infection during neutropenic period  Description: INTERVENTIONS:  - Monitor WBC    Outcome: Progressing     Problem: SAFETY ADULT  Goal: Patient will remain free of falls  Description: INTERVENTIONS:  - Educate patient/family on patient safety including physical limitations  - Instruct patient to call for assistance with activity   - Consult OT/PT to assist with strengthening/mobility   - Keep Call bell within reach  - Keep bed low and locked with side rails adjusted as appropriate  - Keep care items and personal belongings within reach  - Initiate and maintain comfort rounds  - Make Fall Risk Sign visible to staff  - Offer Toileting every 2 Hours, in advance of need  - Initiate/Maintain alarm  - Obtain necessary fall risk management equipment  - Apply yellow socks and bracelet for high fall risk patients  - Consider moving patient to room near nurses station  Outcome: Progressing  Goal: Maintain or return to baseline ADL function  Description: INTERVENTIONS:  -  Assess patient's ability to carry out ADLs; assess patient's baseline for ADL function and identify physical deficits which impact ability to perform ADLs (bathing, care of mouth/teeth, toileting, grooming, dressing, etc.)  - Assess/evaluate cause of self-care deficits   - Assess range of motion  - Assess patient's mobility; develop plan if impaired  - Assess patient's need for assistive devices and provide as appropriate  - Encourage maximum independence but intervene and supervise when necessary  - Involve family in performance of ADLs  - Assess for home care needs following discharge   - Consider OT consult to assist with ADL evaluation and planning for discharge  - Provide patient education as appropriate  Outcome: Progressing  Goal: Maintains/Returns to pre admission functional level  Description: INTERVENTIONS:  - Perform AM-PAC 6 Click Basic Mobility/ Daily Activity  assessment daily.  - Set and communicate daily mobility goal to care team and patient/family/caregiver.   - Collaborate with rehabilitation services on mobility goals if consulted  - Perform Range of Motion 3 times a day.  - Reposition patient every 2 hours.  - Dangle patient 3 times a day  - Stand patient 3 times a day  - Ambulate patient 3 times a day  - Out of bed to chair 3 times a day   - Out of bed for meals 3 times a day  - Out of bed for toileting  - Record patient progress and toleration of activity level   Outcome: Progressing     Problem: DISCHARGE PLANNING  Goal: Discharge to home or other facility with appropriate resources  Description: INTERVENTIONS:  - Identify barriers to discharge w/patient and caregiver  - Arrange for needed discharge resources and transportation as appropriate  - Identify discharge learning needs (meds, wound care, etc.)  - Arrange for interpretive services to assist at discharge as needed  - Refer to Case Management Department for coordinating discharge planning if the patient needs post-hospital services based on physician/advanced practitioner order or complex needs related to functional status, cognitive ability, or social support system  Outcome: Progressing     Problem: Knowledge Deficit  Goal: Patient/family/caregiver demonstrates understanding of disease process, treatment plan, medications, and discharge instructions  Description: Complete learning assessment and assess knowledge base.  Interventions:  - Provide teaching at level of understanding  - Provide teaching via preferred learning methods  Outcome: Progressing     Problem: Nutrition/Hydration-ADULT  Goal: Nutrient/Hydration intake appropriate for improving, restoring or maintaining nutritional needs  Description: Monitor and assess patient's nutrition/hydration status for malnutrition. Collaborate with interdisciplinary team and initiate plan and interventions as ordered.  Monitor patient's weight and dietary  intake as ordered or per policy. Utilize nutrition screening tool and intervene as necessary. Determine patient's food preferences and provide high-protein, high-caloric foods as appropriate.     INTERVENTIONS:  - Monitor oral intake, urinary output, labs, and treatment plans  - Assess nutrition and hydration status and recommend course of action  - Evaluate amount of meals eaten  - Assist patient with eating if necessary   - Allow adequate time for meals  - Recommend/ encourage appropriate diets, oral nutritional supplements, and vitamin/mineral supplements  - Order, calculate, and assess calorie counts as needed  - Recommend, monitor, and adjust tube feedings and TPN/PPN based on assessed needs  - Assess need for intravenous fluids  - Provide specific nutrition/hydration education as appropriate  - Include patient/family/caregiver in decisions related to nutrition  Outcome: Progressing

## 2024-06-16 NOTE — RESPIRATORY THERAPY NOTE
Resp care   06/16/24 0805   Respiratory Assessment   Assessment Type During-treatment   General Appearance Awake   Respiratory Pattern Assisted   Chest Assessment Chest expansion symmetrical   Bilateral Breath Sounds Coarse   Cough Productive   Suction Trach   Resp Comments pt found on documented vent settings, spo2 is 99%, pt titrated to peep of 8, spo2 is 97%, bs are coarse, pt sx via trach, udn tx given via aerogen, will cont to monitor per resp protocol.   O2 Device c3   Vent Information   Vent ID [id number]   Vent type Hook C3   Hook Vent Mode (S)CMV   $ Pulse Oximetry Spot Check Charge Completed   (S)CMV Settings   Resp Rate (BPM) 12 BPM   VT (mL) 500 mL   FIO2 (%) 40 %   PEEP (cmH2O) 8 cmH2O   I:E Ratio 1/4.6   Insp Time (%) 0.9 %   Flow Trigger (LPM) 3   Humidification Heater   Heater Temperature (Set) 87.8 °F (31 °C)   (S)CMV Actuals   Resp Rate (BPM) 13 BPM   VT (mL) 493   MV 6.3   MAP (cmH2O) 13 cmH2O   Peak Pressure (cmH2O) 28 cmH2O   I:E Ratio (Obs) 1/4.6   Insp Resistance 20   Heater Temperature (Obs) 87.8 °F (31 °C)   Static Compliance (mL/cmH20) 42.9 mL/cmH2O   (S)CMV Alarms   High Peak Pressure (cmH2O) 45   Low Pressure (cmH2O) 5 cm H2O   High Resp Rate (BPM) 40 BPM   Low Resp Rate (BPM) 8 BPM   High MV (L/min) 20 L/min   Low MV (L/min) 3.5 L/min   High VT (mL) 1000 mL   Low VT (mL) 250 mL   Apnea Time (s) 20 S   Maintenance   Alarm (pink) cable attached No   Resuscitation bag with peep valve at bedside Yes   Water bag changed No   Circuit changed No   Daily Screen   Patient safety screen outcome: Failed   Not Ready for Weaning due to: Underline problem not resolved   IHI Ventilator Associated Pneumonia Bundle   Daily Awakening Trials Performed Yes   Daily Assessment of Readiness to Extubate Yes   Head of Bed Elevated HOB 30   Surgical Airway Distal;Cuffed;Other (Comment)   Placement Date/Time: 06/01/24 1155   Tube Size: 6  Placed By: Physician  Placed by (Name): Dr. Ronquillo  Type:  Tracheostomy  Style: (c) Distal;Cuffed;Other (Comment)   Status Cuff Inflated;Secured   Site Assessment Clean;Dry   Ties Assessment Clean;Dry;Intact;Secure   Surgical Airway Cuff Pressure (color) Green   Equipment at bedside BVM;Wall Suction setup;Additional complete same size trach tube;Additional complete one size smaller trach tube;Obturator;Sterile saline;Additional inner cannula

## 2024-06-17 LAB
ANION GAP SERPL CALCULATED.3IONS-SCNC: 8 MMOL/L (ref 4–13)
BASOPHILS # BLD AUTO: 0.03 THOUSANDS/ÂΜL (ref 0–0.1)
BASOPHILS NFR BLD AUTO: 0 % (ref 0–1)
BUN SERPL-MCNC: 11 MG/DL (ref 5–25)
CALCIUM SERPL-MCNC: 9.3 MG/DL (ref 8.4–10.2)
CHLORIDE SERPL-SCNC: 93 MMOL/L (ref 96–108)
CO2 SERPL-SCNC: 37 MMOL/L (ref 21–32)
CREAT SERPL-MCNC: 0.36 MG/DL (ref 0.6–1.3)
EOSINOPHIL # BLD AUTO: 0.09 THOUSAND/ÂΜL (ref 0–0.61)
EOSINOPHIL NFR BLD AUTO: 1 % (ref 0–6)
ERYTHROCYTE [DISTWIDTH] IN BLOOD BY AUTOMATED COUNT: 15.5 % (ref 11.6–15.1)
GFR SERPL CREATININE-BSD FRML MDRD: 158 ML/MIN/1.73SQ M
GLUCOSE SERPL-MCNC: 88 MG/DL (ref 65–140)
HCT VFR BLD AUTO: 37.3 % (ref 36.5–49.3)
HGB BLD-MCNC: 11.8 G/DL (ref 12–17)
IMM GRANULOCYTES # BLD AUTO: 0.08 THOUSAND/UL (ref 0–0.2)
IMM GRANULOCYTES NFR BLD AUTO: 1 % (ref 0–2)
LYMPHOCYTES # BLD AUTO: 1.21 THOUSANDS/ÂΜL (ref 0.6–4.47)
LYMPHOCYTES NFR BLD AUTO: 16 % (ref 14–44)
MAGNESIUM SERPL-MCNC: 2.1 MG/DL (ref 1.9–2.7)
MCH RBC QN AUTO: 29.7 PG (ref 26.8–34.3)
MCHC RBC AUTO-ENTMCNC: 31.6 G/DL (ref 31.4–37.4)
MCV RBC AUTO: 94 FL (ref 82–98)
MONOCYTES # BLD AUTO: 0.59 THOUSAND/ÂΜL (ref 0.17–1.22)
MONOCYTES NFR BLD AUTO: 8 % (ref 4–12)
NEUTROPHILS # BLD AUTO: 5.53 THOUSANDS/ÂΜL (ref 1.85–7.62)
NEUTS SEG NFR BLD AUTO: 74 % (ref 43–75)
NRBC BLD AUTO-RTO: 0 /100 WBCS
PHOSPHATE SERPL-MCNC: 3.5 MG/DL (ref 2.7–4.5)
PLATELET # BLD AUTO: 244 THOUSANDS/UL (ref 149–390)
PMV BLD AUTO: 10.1 FL (ref 8.9–12.7)
POTASSIUM SERPL-SCNC: 4.2 MMOL/L (ref 3.5–5.3)
RBC # BLD AUTO: 3.97 MILLION/UL (ref 3.88–5.62)
SODIUM SERPL-SCNC: 138 MMOL/L (ref 135–147)
WBC # BLD AUTO: 7.53 THOUSAND/UL (ref 4.31–10.16)

## 2024-06-17 PROCEDURE — 80048 BASIC METABOLIC PNL TOTAL CA: CPT

## 2024-06-17 PROCEDURE — 94760 N-INVAS EAR/PLS OXIMETRY 1: CPT

## 2024-06-17 PROCEDURE — 94669 MECHANICAL CHEST WALL OSCILL: CPT

## 2024-06-17 PROCEDURE — 99291 CRITICAL CARE FIRST HOUR: CPT | Performed by: INTERNAL MEDICINE

## 2024-06-17 PROCEDURE — 83735 ASSAY OF MAGNESIUM: CPT

## 2024-06-17 PROCEDURE — 97163 PT EVAL HIGH COMPLEX 45 MIN: CPT

## 2024-06-17 PROCEDURE — 84100 ASSAY OF PHOSPHORUS: CPT

## 2024-06-17 PROCEDURE — 94003 VENT MGMT INPAT SUBQ DAY: CPT

## 2024-06-17 PROCEDURE — 94640 AIRWAY INHALATION TREATMENT: CPT

## 2024-06-17 PROCEDURE — 94664 DEMO&/EVAL PT USE INHALER: CPT

## 2024-06-17 PROCEDURE — 94668 MNPJ CHEST WALL SBSQ: CPT

## 2024-06-17 PROCEDURE — 97167 OT EVAL HIGH COMPLEX 60 MIN: CPT

## 2024-06-17 PROCEDURE — 85025 COMPLETE CBC W/AUTO DIFF WBC: CPT

## 2024-06-17 RX ORDER — ENOXAPARIN SODIUM 100 MG/ML
1 INJECTION SUBCUTANEOUS ONCE
Status: COMPLETED | OUTPATIENT
Start: 2024-06-17 | End: 2024-06-17

## 2024-06-17 RX ORDER — MIDAZOLAM HYDROCHLORIDE 2 MG/2ML
4 INJECTION, SOLUTION INTRAMUSCULAR; INTRAVENOUS ONCE
Status: DISCONTINUED | OUTPATIENT
Start: 2024-06-17 | End: 2024-06-17

## 2024-06-17 RX ORDER — GLYCOPYRROLATE 0.2 MG/ML
0.2 INJECTION INTRAMUSCULAR; INTRAVENOUS ONCE
Status: COMPLETED | OUTPATIENT
Start: 2024-06-17 | End: 2024-06-17

## 2024-06-17 RX ORDER — FENTANYL CITRATE 50 UG/ML
200 INJECTION, SOLUTION INTRAMUSCULAR; INTRAVENOUS ONCE
Status: DISCONTINUED | OUTPATIENT
Start: 2024-06-17 | End: 2024-06-17

## 2024-06-17 RX ORDER — PROPOFOL 10 MG/ML
200 INJECTION, EMULSION INTRAVENOUS ONCE
Status: DISCONTINUED | OUTPATIENT
Start: 2024-06-17 | End: 2024-06-17

## 2024-06-17 RX ADMIN — SODIUM CHLORIDE SOLN NEBU 3% 4 ML: 3 NEBU SOLN at 12:56

## 2024-06-17 RX ADMIN — SODIUM CHLORIDE SOLN NEBU 3% 4 ML: 3 NEBU SOLN at 07:00

## 2024-06-17 RX ADMIN — SODIUM CHLORIDE SOLN NEBU 3% 4 ML: 3 NEBU SOLN at 02:48

## 2024-06-17 RX ADMIN — MICONAZOLE NITRATE: 20 CREAM TOPICAL at 11:04

## 2024-06-17 RX ADMIN — LEVALBUTEROL HYDROCHLORIDE 1.25 MG: 1.25 SOLUTION RESPIRATORY (INHALATION) at 07:00

## 2024-06-17 RX ADMIN — LEVALBUTEROL HYDROCHLORIDE 1.25 MG: 1.25 SOLUTION RESPIRATORY (INHALATION) at 12:55

## 2024-06-17 RX ADMIN — ENOXAPARIN SODIUM 90 MG: 100 INJECTION SUBCUTANEOUS at 11:17

## 2024-06-17 RX ADMIN — SODIUM CHLORIDE SOLN NEBU 3% 4 ML: 3 NEBU SOLN at 19:09

## 2024-06-17 RX ADMIN — LEVALBUTEROL HYDROCHLORIDE 1.25 MG: 1.25 SOLUTION RESPIRATORY (INHALATION) at 02:48

## 2024-06-17 RX ADMIN — GUAIFENESIN 200 MG: 100 SOLUTION ORAL at 04:05

## 2024-06-17 RX ADMIN — GUAIFENESIN 200 MG: 100 SOLUTION ORAL at 15:57

## 2024-06-17 RX ADMIN — CHLORHEXIDINE GLUCONATE 0.12% ORAL RINSE 15 ML: 1.2 LIQUID ORAL at 21:34

## 2024-06-17 RX ADMIN — MICONAZOLE NITRATE: 20 CREAM TOPICAL at 19:06

## 2024-06-17 RX ADMIN — FLUOXETINE HYDROCHLORIDE 10 MG: 10 CAPSULE ORAL at 08:08

## 2024-06-17 RX ADMIN — SODIUM CHLORIDE 500 ML: 0.9 INJECTION, SOLUTION INTRAVENOUS at 13:20

## 2024-06-17 RX ADMIN — GLYCOPYRROLATE 0.2 MG: 0.2 INJECTION, SOLUTION INTRAMUSCULAR; INTRAVENOUS at 21:34

## 2024-06-17 RX ADMIN — CHLORHEXIDINE GLUCONATE 0.12% ORAL RINSE 15 ML: 1.2 LIQUID ORAL at 08:08

## 2024-06-17 RX ADMIN — LEVALBUTEROL HYDROCHLORIDE 1.25 MG: 1.25 SOLUTION RESPIRATORY (INHALATION) at 19:08

## 2024-06-17 RX ADMIN — GUAIFENESIN 200 MG: 100 SOLUTION ORAL at 08:09

## 2024-06-17 NOTE — PLAN OF CARE
Problem: OCCUPATIONAL THERAPY ADULT  Goal: Performs self-care activities at highest level of function for planned discharge setting.  See evaluation for individualized goals.  Description: Treatment Interventions: ADL retraining, Visual perceptual retraining, UE strengthening/ROM, Functional transfer training, Endurance training, Cognitive reorientation, Patient/family training, Equipment evaluation/education, Fine motor coordination activities, Compensatory technique education, Neuromuscular reeducation, Continued evaluation, Activityengagement, Energy conservation          See flowsheet documentation for full assessment, interventions and recommendations.   Note: Limitation: Decreased ADL status, Decreased UE ROM, Decreased UE strength, Decreased Safe judgement during ADL, Decreased cognition, Decreased endurance, Decreased sensation, Visual deficit, Decreased fine motor control, Decreased self-care trans, Decreased high-level ADLs  Prognosis: Fair  Assessment: Pt is a 39 y.o. male admitted to [facility] on 6/10/2024 w/ SOB, pneumonia. Pt with cardiac arrest 5/31. Also with seizure-like activity and toxic metabolic encephalopathy.  has a past medical history of Anxiety, Cancer, Depression, Migration of PEG tube, Tracheostomy, LVH, PE, and Psychiatric disorder. Pt with active OT orders and  OOB to chair  orders. Pt seen as a co-evaluation with PT due to the patient's co-morbidities, clinically unstable presentation/clinical complexity, and present impairments. As per pt report, pta, resides with his family in a 2STH, ramped entrance. Pt was I w/  ADLS and has A with IADLS, (-) drove. Upon evaluation, pt currently requires MAX A x 2 for transfers.Exhibits decreased strength to B/L UE's limiting performance in ADLs/transfers. Pt currently requires MOD A eating, MIN A grooming, MOD A UB ADLs, MOD A LB ADLs, and MAX A toileting. Pt is limited at this time 2*: endurance, activity tolerance, functional mobility, balance,  functional standing tolerance, decreased I w/ ADLS/IADLS, strength, and cognitive impairments.The following Occupational Performance Areas to address include: eating, grooming, bathing/shower, toilet hygiene, dressing, health maintenance, functional mobility, community mobility, and clothing management. Pt to benefit from immediate acute skilled OT to address above deficits, improve overall functional independence, maximize fnxl mobility and reduce caregiver burden. From OT standpoint, recommendation at time of d/c would be STR.  Pt was left after session with all current needs met. The patient's raw score on the -PAC Daily Activity Inpatient Short Form is 13. A raw score of less than 19 suggests the patient may benefit from discharge to post-acute rehabilitation services. Please refer to the recommendation of the Occupational Therapist for safe discharge planning.     Rehab Resource Intensity Level, OT: I (Maximum Resource Intensity)

## 2024-06-17 NOTE — PHYSICAL THERAPY NOTE
Physical Therapy Evaluation    Patient Name: Hemanth Swanson    Today's Date: 6/17/2024     Problem List  Active Problems:    Umbilical hernia without obstruction and without gangrene    Depression    S/P percutaneous endoscopic gastrostomy (PEG) tube placement (HCC)    Status post tracheostomy (HCC)    Ventilator dependent (HCC)    Seizure-like activity (HCC)    Ventilator associated pneumonia (HCC)    Cardiac arrest due to other underlying condition (HCC)       Past Medical History  Past Medical History:   Diagnosis Date    Anxiety     Cancer (HCC)     Depression     Migration of percutaneous endoscopic gastrostomy (PEG) tube (HCC) 09/24/2023    Psychiatric disorder     bipolar        Past Surgical History  Past Surgical History:   Procedure Laterality Date    IR BIOPSY LYMPH NODE  01/20/2023    IR GASTROSTOMY TUBE PLACEMENT  09/25/2023    IR LUMBAR PUNCTURE  09/06/2023    IR PORT PLACEMENT  03/14/2023    ORCHIECTOMY Left 12/14/2022    Procedure: ORCHIECTOMY INGUINAL;  Surgeon: Flip Michael MD;  Location:  MAIN OR;  Service: Urology    PEG W/TRACHEOSTOMY PLACEMENT N/A 09/08/2023    Procedure: TRACHEOSTOMY WITH INSERTION PEG TUBE;  Surgeon: Rudy Mcmanus MD;  Location: [facility];  Service: General    TESTICLE SURGERY             06/17/24 1123   PT Last Visit   PT Visit Date 06/17/24   Note Type   Note type Evaluation   Pain Assessment   Pain Assessment Tool FLACC   Pain Rating: FLACC (Rest) - Face 0   Pain Rating: FLACC (Rest) - Legs 0   Pain Rating: FLACC (Rest) - Activity 0   Pain Rating: FLACC (Rest) - Cry 0   Pain Rating: FLACC (Rest) - Consolability 0   Score: FLACC (Rest) 0   Pain Rating: FLACC (Activity) - Face 1   Pain Rating: FLACC (Activity) - Legs 0   Pain Rating: FLACC (Activity) - Activity 0   Pain Rating: FLACC (Activity) - Cry 0   Pain Rating: FLACC (Activity) - Consolability 0   Score: FLACC (Activity) 1   Restrictions/Precautions    Weight Bearing Precautions Per Order No   Other Precautions Chair Alarm;Cognitive;Bed Alarm;Multiple lines;Telemetry;O2;Fall Risk;Pain  (Trach; Vent dependent; PEG; Hx of anoxic brain injury)   Home Living   Type of Home House   Home Layout Bed/bath upstairs;Stairs to enter with rails;Two level   Bathroom Shower/Tub Tub/shower unit   Bathroom Toilet Standard   Home Equipment Hospital bed;Walker   Additional Comments Pt is a ? historian and minimally verbal 2/2 trach. Per EMR, pt lives in 2SH w/ 7-8 ADIEL and FFOS to access 2nd floor. Pt reports he does not use AD at baseline   Prior Function   Level of [place] Needs assistance with IADLS;Needs assistance with ADLs;Needs assistance with functional mobility   Lives With Family  (sister + family)   Receives Help From Family   IADLs Family/Friend/Other provides transportation;Family/Friend/Other provides meals;Family/Friend/Other provides medication management   Comments Pt ? historian and reports IND in all ADLs and mobility w/o AD. Above PLOF obtained via EMR   General   Family/Caregiver Present No   Cognition   Overall Cognitive Status Impaired   Arousal/Participation Responsive   Orientation Level Oriented to person;Oriented to time   Memory Unable to assess   Following Commands Follows one step commands with increased time or repetition   Comments Pt pleasant and cooperative during PT session. Minimally verbal 2/2 trach and hx of anoxic brain injury. Pt able to follow ~75% of simple commands w/ increased time and repetition   RLE Assessment   RLE Assessment X  (Grossly 3-/5 observed with mobility)   LLE Assessment   LLE Assessment X  (Grossly 3-/5 observed with mobility)   Bed Mobility   Supine to Sit 2  Maximal assistance   Additional items Assist x 2;HOB elevated;Increased time required;Verbal cues;LE management   Sit to Supine 2  Maximal assistance   Additional items Assist x 2;Increased time required;Verbal cues;LE management   Additional Comments Pt  supine in bed upon arrival. Pt  sat EOB for ~8 min w/ SUP- min A. Pt returned to bed with call bell and all needs following PT session   Transfers   Sit to Stand 2  Maximal assistance   Additional items Increased time required;Verbal cues   Stand to Sit 2  Maximal assistance   Additional items Assist x 2;Increased time required;Verbal cues   Additional Comments w/ HHA   Ambulation/Elevation   Gait pattern Not appropriate;Not tested   Balance   Static Sitting Fair -   Dynamic Sitting Poor +   Static Standing Poor   Dynamic Standing Poor -   Ambulatory Zero   Endurance Deficit   Endurance Deficit Yes   Endurance Deficit Description Fatigue, weakness, decreased activity tolerance   Activity Tolerance   Activity Tolerance Patient limited by fatigue;Treatment limited secondary to medical complications (Comment)   Medical Staff Made Aware CHAD monk 2/2 medical complexity   Nurse Made Aware RN cleared and updated   Assessment   Prognosis Good   Problem List Decreased strength;Decreased range of motion;Decreased endurance;Impaired balance;Decreased mobility;Decreased coordination;Decreased cognition;Impaired judgement;Decreased safety awareness;Pain   Assessment Pt is a 39 y.o. male seen for PT evaluation s/p admit to [facility] on 6/10/2024.  PT now consulted for assessment of mobility and d/c needs. Pt with OOB to chair orders.  Pts current comorbidities effecting treatment include: ventilator dependent, seizure-like activity, cardia arrest 2/2 underlying condition, hx of anoxic brain injury. Pts current clinical presentation is Unstable/ Unpredictable (high complexity) due to Ongoing medical management for primary dx, Increased reliance on more restrictive AD compared to baseline, Decreased activity tolerance compared to baseline, Fall risk, Increased assistance needed from caregiver at current time, Ongoing telemetry monitoring, Cog status, Increased O2 via NC from pts baseline, Trending lab values,  Continuous pulse oximetry monitoring . Prior to admission, pt was living w/ sister in house and reports IND in mobility/ADLs?. Upon evaluation, pt currently is requiring max Ax2 for bed mobility; max Ax2 for transfers. Pt presents at PT eval functioning below baseline and currently w/ overall mobility deficits 2* to: BLE weakness, impaired balance, decreased endurance, gait deviations, pain, decreased activity tolerance compared to baseline, decreased functional mobility tolerance compared to baseline, decreased safety awareness, impaired judgement, fall risk, decreased cognition. Pt currently at a fall risk 2* to impairments listed above.  Pt will continue to benefit from skilled acute PT interventions to address stated impairments; to maximize functional mobility; for ongoing pt/ family training; and DME needs. At conclusion of PT session pt returned BTB, bed alarm engaged, and all needs in reach with phone and call bell within reach. Pt denies any further questions at this time. The patient's AM-PAC Basic Mobility Inpatient Short Form Raw Score is 7. A Raw score of less than or equal to 16 suggests the patient may benefit from discharge to post-acute rehabilitation services. Please also refer to the recommendation of the Physical Therapist for safe discharge planning. Recommend acute rehab upon hospital D/C.   Barriers to Discharge Inaccessible home environment;Decreased caregiver support   Goals   Patient Goals none stated   STG Expiration Date 07/01/24   Short Term Goal #1 STG 1. Pt will be able to perform bed mobility tasks with mod A in order to improve overall functional mobility and assist in safe d/c. STG 2. Pt with sit EOB for at least 25 minutes at mod I level in order to strengthen abdominal musculature and assist in future transfers/ ambulation. STG 3. Pt will be able to perform functional transfer with mod A in order to improve overall functional mobility and assist in safe d/c. STG 4. Pt will be able  to ambulate at least 5 feet with least restrictive device with mod A in order to improve overall functional mobility and assist in safe d/c. STG 5. Pt will improve sitting/standing static/dynamic balance 1/2 grade in order to improve functional mobility and assist in safe d/c. STG 6. Pt will improve LE strength by 1/2 grade in order to improve functional mobility and assist in safe d/c.   PT Treatment Day 0   Plan   Treatment/Interventions ADL retraining;Functional transfer training;LE strengthening/ROM;Therapeutic exercise;Endurance training;Cognitive reorientation;Patient/family training;Equipment eval/education;Bed mobility;Gait training;Spoke to nursing;Spoke to case management;OT   PT Frequency 3-5x/wk   Discharge Recommendation   Rehab Resource Intensity Level, PT I (Maximum Resource Intensity)   Equipment Recommended   (TBD)   AM-PAC Basic Mobility Inpatient   Turning in Flat Bed Without Bedrails 2   Lying on Back to Sitting on Edge of Flat Bed Without Bedrails 1   Moving Bed to Chair 1   Standing Up From Chair Using Arms 1   Walk in Room 1   Climb 3-5 Stairs With Railing 1   Basic Mobility Inpatient Raw Score 7   Turning Head Towards Sound 3   Follow Simple Instructions 2   Low Function Basic Mobility Raw Score  12   Low Function Basic Mobility Standardized Score  18.33   [facility] Highest Level Of Mobility   -HLM Goal 2: Bed activities/Dependent transfer   -HLM Achieved 3: Sit at edge of bed   Modified Urania Scale   Modified Kaylee Scale 4   End of Consult   Patient Position at End of Consult Supine;Bed/Chair alarm activated;All needs within reach     Amber Gongora PT, DPT

## 2024-06-17 NOTE — RESPIRATORY THERAPY NOTE
RT Ventilator Management Note  Hemanth Swanson 39 y.o. male MRN: [id number]  [facility]: [id number]      Daily Screen         6/15/2024  0726 6/16/2024  0805          Patient safety screen outcome:: Failed Failed      Not Ready for Weaning due to:: PEEP > 8cmH2O Underline problem not resolved                Physical Exam:   Assessment Type: Assess only  General Appearance: Awake  Respiratory Pattern: Assisted  Chest Assessment: Chest expansion symmetrical  Bilateral Breath Sounds: Coarse, Diminished, Inspiratory wheezes      Resp Comments: pt remained on listed settings through the night with no vent chagnes. Pt had 1 desat episode overnight which required bagging. Pt placed back on vent.     06/17/24 0423   Respiratory Assessment   Assessment Type Assess only   General Appearance Awake   Respiratory Pattern Assisted   Chest Assessment Chest expansion symmetrical   Bilateral Breath Sounds Coarse;Diminished;Inspiratory wheezes   Resp Comments pt remained on listed settings through the night with no vent chagnes. Pt had 1 desat episode overnight which required bagging. Pt placed back on vent.   Vent Information   Vent ID [id number]   Vent type Hook C3   [place] Vent Mode (S)CMV   $ Vent Daily Charge-Subsequent Yes   $ Pulse Oximetry Spot Check Charge Completed   (S)CMV Settings   Resp Rate (BPM) 12 BPM   VT (mL) 500 mL   FIO2 (%) 40 %   PEEP (cmH2O) 8 cmH2O   I:E Ratio 1/4.6   Insp Time (%) 0.9 %   Flow Trigger (LPM) 3   Humidification Heater   Heater Temperature (Set) 87.8 °F (31 °C)   (S)CMV Actuals   Resp Rate (BPM) 15 BPM   VT (mL) 520   MV 6.2   MAP (cmH2O) 11 cmH2O   Peak Pressure (cmH2O) 30 cmH2O   I:E Ratio (Obs) 1/4.6   Insp Resistance 30   Heater Temperature (Obs) 87.8 °F (31 °C)   (S)CMV Alarms   High Peak Pressure (cmH2O) 45   Low Pressure (cmH2O) 5 cm H2O   High Resp Rate (BPM) 40 BPM   Low Resp Rate (BPM) 8 BPM   High MV (L/min) 20 L/min   Low MV (L/min) 3.5 L/min   High VT (mL) 1000  mL   Low VT (mL) 250 mL   Apnea Time (s) 20 S   Maintenance   Alarm (pink) cable attached No   Resuscitation bag with peep valve at bedside Yes   Water bag changed No   Circuit changed No   IHI Ventilator Associated Pneumonia Bundle   Head of Bed Elevated HOB 30   Surgical Airway Distal;Cuffed;Other (Comment)   Placement Date/Time: 06/01/24 1155   Tube Size: 6  Placed By: Physician  Placed by (Name): Dr. Ronquillo  Type: Tracheostomy  Style: (c) Distal;Cuffed;Other (Comment)   Status Cuff Inflated;Secured   Surgical Airway Cuff Pressure (color) Green   Equipment at bedside BVM;Wall Suction setup;Additional complete same size trach tube;Additional complete one size smaller trach tube;Obturator;Sterile saline;Additional inner cannula

## 2024-06-17 NOTE — CASE MANAGEMENT
Case Management Discharge Planning Note    Patient name Hemanth Swanson  [facility] [id number]  : 1985 Date 2024       Current Admission Date: 6/10/2024  Current Admission Diagnosis:Umbilical hernia without obstruction and without gangrene   Patient Active Problem List    Diagnosis Date Noted Date Diagnosed    Ventilator associated pneumonia (HCC) 2024     Cardiac arrest due to other underlying condition ([facility]) 2024     Seizure-like activity ([facility]) 2024     Bradycardia 2024     Ventilator dependent ([facility]) 2024     Neuromuscular disorder ([facility]) 2024     Obesity hypoventilation syndrome ([facility]) 10/24/2023     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     Psychophysiological insomnia 10/18/2023     Impaired physical mobility 2023     Status post tracheostomy ([facility]) 2023     S/P percutaneous endoscopic gastrostomy (PEG) tube placement ([facility]) 2023     Anxiety 2023     Chronic respiratory failure with hypoxia and hypercapnia ([facility]) 2023     Sepsis ([facility]) 2023     Acute pulmonary embolism without acute cor pulmonale ([facility]) 2023     Depression 2023     Neuromuscular weakness ([facility]) 2023     History of LVH (left ventricular hypertrophy) 2023     Pure hypertriglyceridemia 2023     Unilateral vestibular schwannoma ([facility]) 2023     Port-A-Cath in place 2023     Diplopia 2023     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HCC) 2023     Umbilical hernia without obstruction and without gangrene 12/10/2022     Tobacco use 12/10/2022     Retroperitoneal lymphadenopathy 12/10/2022       LOS (days): 7  Geometric Mean LOS (GMLOS) (days):   Days to GMLOS:     OBJECTIVE:  Risk of Unplanned Readmission Score: 38.48         Current admission status: Inpatient   Preferred Pharmacy:   [facility]/pharmacy #[id number] - [address] [address]  Phone: [phone]  Fax: [phone]    Primary Care Provider: Ela Douglas MD    Primary Insurance: [facility]  Secondary Insurance:     DISCHARGE DETAILS:    Discharge planning discussed with:: Pt's sister Dmitry  Freedom of Choice: Yes  Comments - Freedom of Choice: Discussed FOC  CM contacted family/caregiver?: Yes  Were Treatment Team discharge recommendations reviewed with patient/caregiver?: Yes          Contacts  Patient Contacts: Dmitry Swanson (sister)  Relationship to Patient:: Family  Contact Method: Phone  Phone Number: [phone]  Reason/Outcome: Discharge Planning, Emergency Contact, Continuity of Care              Other Referral/Resources/Interventions Provided:  Referral Comments: Message received from provider plan for trach upsize today and anticipate potential discharge home tomorrow.  Message to MICU resident requesting PT/OT orders.  [place] pt's sister Dmitry to discuss dcp.  Stated pt has been at [facility] in the past but would not put her back in a facility. Stated pt will come home - she has help at home.  Pt has his vent here at hospital.  Will need trach supplies ordered and possibly a nicky or sit to stand pending therapy evals.  Per Dmitry pt did sit at EOB prior to hospitalization and it would take 3 ppl to get pt up and OOB.  Has a w/c.  CM to follow for dcp.  Pt will need transportation arranged to home - confirmed address and has a ramp.  [facility]

## 2024-06-17 NOTE — OCCUPATIONAL THERAPY NOTE
Occupational Therapy Evaluation     Patient Name: Hemanth Swanson  Today's Date: 6/17/2024  Problem List  Active Problems:    Umbilical hernia without obstruction and without gangrene    Depression    S/P percutaneous endoscopic gastrostomy (PEG) tube placement (HCC)    Status post tracheostomy (HCC)    Ventilator dependent (HCC)    Seizure-like activity (HCC)    Ventilator associated pneumonia (HCC)    Cardiac arrest due to other underlying condition (HCC)    Past Medical History  Past Medical History:   Diagnosis Date    Anxiety     Cancer (HCC)     Depression     Migration of percutaneous endoscopic gastrostomy (PEG) tube (HCC) 09/24/2023    Psychiatric disorder     bipolar     Past Surgical History  Past Surgical History:   Procedure Laterality Date    IR BIOPSY LYMPH NODE  01/20/2023    IR GASTROSTOMY TUBE PLACEMENT  09/25/2023    IR LUMBAR PUNCTURE  09/06/2023    IR PORT PLACEMENT  03/14/2023    ORCHIECTOMY Left 12/14/2022    Procedure: ORCHIECTOMY INGUINAL;  Surgeon: Flip Michael MD;  Location:  MAIN OR;  Service: Urology    PEG W/TRACHEOSTOMY PLACEMENT N/A 09/08/2023    Procedure: TRACHEOSTOMY WITH INSERTION PEG TUBE;  Surgeon: Rudy Mcmanus MD;  Location: [facility];  Service: General    TESTICLE SURGERY           06/17/24 1147   OT Last Visit   OT Visit Date 06/17/24   Note Type   Note type Evaluation   Pain Assessment   Pain Assessment Tool FLACC   Pain Rating: FLACC (Rest) - Face 0   Pain Rating: FLACC (Rest) - Legs 0   Pain Rating: FLACC (Rest) - Activity 0   Pain Rating: FLACC (Rest) - Cry 0   Pain Rating: FLACC (Rest) - Consolability 0   Score: FLACC (Rest) 0   Pain Rating: FLACC (Activity) - Face 1   Pain Rating: FLACC (Activity) - Legs 0   Pain Rating: FLACC (Activity) - Activity 0   Pain Rating: FLACC (Activity) - Cry 0   Pain Rating: FLACC (Activity) - Consolability 0   Score: FLACC (Activity) 1   Restrictions/Precautions   Weight Bearing Precautions Per Order No   Other  Precautions Cognitive;Chair Alarm;Bed Alarm;Multiple lines;O2;Fall Risk;Telemetry;Visual impairment  (trach to vent, PEG, hx of anoxic brain injury)   Home Living   Type of Home House   Home Layout Two level;Ramped entrance   Bathroom Shower/Tub Walk-in shower   Bathroom Toilet Raised   Bathroom Equipment Grab bars in shower;Shower chair   Home Equipment Walker;Hospital bed  (pt reports he ambulated independently at baseline)   Additional Comments pt is a questionable historian. Information per pt and chart   Prior Function   Level of [place] Independent with ADLs;Needs assistance with IADLS   Lives With Family  (per chart, resides with sister, 2 neices, aunt, and sister's grandfather)   Receives Help From Family   IADLs Family/Friend/Other provides medication management;Family/Friend/Other provides meals;Family/Friend/Other provides transportation   Comments pt is a questionable historian   Lifestyle   Autonomy At baseline, pt reports being I with ADLs and fnxl mobility, family A with IADLs   Reciprocal Relationships Family - per chart, pt has 24/7 support   Intrinsic Gratification Likes the cowboys and watching TV   General   Additional Pertinent History Pt with hx of testicular cx s/p orchiectomy/chemotherapy, vent dependent at home, and TBI/anoxic brain injury   ADL   Where Assessed Edge of bed   Eating Assistance 3  Moderate Assistance   Grooming Assistance 4  Minimal Assistance   UB Bathing Assistance 3  Moderate Assistance   LB Bathing Assistance 3  Moderate Assistance   UB Dressing Assistance 3  Moderate Assistance   LB Dressing Assistance 3  Moderate Assistance   LB Dressing Deficit Don/doff R sock;Don/doff L sock   Toileting Assistance  2  Maximal Assistance   Bed Mobility   Supine to Sit 2  Maximal assistance   Additional items Assist x 2;Bedrails;Increased time required;LE management   Sit to Supine 2  Maximal assistance   Additional items Assist x 2;Bedrails;HOB elevated;Increased time required;LE  management   Transfers   Sit to Stand 2  Maximal assistance   Additional items Assist x 2;Increased time required   Stand to Sit 2  Maximal assistance   Additional items Assist x 2;Increased time required   Additional Comments transfers with B/L HHA   Functional Mobility   Additional Comments not appropriate at this time   Balance   Static Sitting Fair -   Dynamic Sitting Poor +   Static Standing Poor   Dynamic Standing Poor -   Activity Tolerance   Activity Tolerance Patient limited by fatigue   Medical Staff Made Aware PT Amber   Nurse Made Aware RN clearance for session   RUE Assessment   RUE Assessment   (grasp grossly WFL; though, decreased intrinsic hand strength, shoulder flexion ~ 75*)   LUE Assessment   LUE Assessment   (grasp grossly WFL; though, decreased intrinsic hand strength, shoulder flexion ~ 90*)   Hand Function   Fine Motor Coordination Impaired   Hand Function Comments decreased finger to nose coordination   Sensation   Light Touch No apparent deficits   Vision - Complex Assessment   Ocular Range of Motion Impaired   Tracking Impaired   Additional Comments decreased tracking to inferior and R visual fields, unable to read employee name bage   Cognition   Overall Cognitive Status Impaired   Arousal/Participation Cooperative   Attention Attends with cues to redirect   Orientation Level Oriented to person;Oriented to place  (generally to place (hospital), oriented to year but not month)   Memory Unable to assess   Following Commands Follows one step commands with increased time or repetition   Comments Pt presents with flat affect but cooperative t/o session. Minimally verbal 2* trach but communicates via mouthing words, nodding head yes/no, and giving thumbs up/down   Assessment   Limitation Decreased ADL status;Decreased UE ROM;Decreased UE strength;Decreased Safe judgement during ADL;Decreased cognition;Decreased endurance;Decreased sensation;Visual deficit;Decreased fine motor control;Decreased  self-care trans;Decreased high-level ADLs   Prognosis Fair   Assessment Pt is a 39 y.o. male admitted [facility] on [facility]10[facility] w/ SOB, pneumonia. Pt with cardiac arrest 5/31. Also with seizure-like activity and toxic metabolic encephalopathy.  has a past medical history of Anxiety, Cancer, Depression, Migration of PEG tube, Tracheostomy, LVH, PE, and Psychiatric disorder. Pt with active OT orders and  OOB to chair  orders. Pt seen as a co-evaluation with PT due to the patient's co-morbidities, clinically unstable presentation/clinical complexity, and present impairments. As per pt report, pta, resides with his family in a 2STH, ramped entrance. Pt was I w/  ADLS and has A with IADLS, (-) drove. Upon evaluation, pt currently requires MAX A x 2 for transfers.Exhibits decreased strength to B/L UE's limiting performance in ADLs/transfers. Pt currently requires MOD A eating, MIN A grooming, MOD A UB ADLs, MOD A LB ADLs, and MAX A toileting. Pt is limited at this time 2*: endurance, activity tolerance, functional mobility, balance, functional standing tolerance, decreased I w/ ADLS/IADLS, strength, and cognitive impairments.The following Occupational Performance Areas to address include: eating, grooming, bathing/shower, toilet hygiene, dressing, health maintenance, functional mobility, community mobility, and clothing management. Pt to benefit from immediate acute skilled OT to address above deficits, improve overall functional independence, maximize fnxl mobility and reduce caregiver burden. From OT standpoint, recommendation at time of d/c would be STR.  Pt was left after session with all current needs met. The patient's raw score on the AM-PAC Daily Activity Inpatient Short Form is 13. A raw score of less than 19 suggests the patient may benefit from discharge to post-acute rehabilitation services. Please refer to the recommendation of the Occupational Therapist for safe discharge planning.   Goals   LTG Time Frame 10-14    Plan   Treatment Interventions ADL retraining;Visual perceptual retraining;UE strengthening/ROM;Functional transfer training;Endurance training;Cognitive reorientation;Patient/family training;Equipment evaluation/education;Fine motor coordination activities;Compensatory technique education;Neuromuscular reeducation;Continued evaluation;Activityengagement;Energy conservation   Goal Expiration Date 07/01/24   OT Frequency 2-3x/wk   Discharge Recommendation   Rehab Resource Intensity Level, OT I (Maximum Resource Intensity)   AM-PAC Daily Activity Inpatient   Lower Body Dressing 2   Bathing 2   Toileting 2   Upper Body Dressing 2   Grooming 3   Eating 2   Daily Activity Raw Score 13   Daily Activity Standardized Score (Calc for Raw Score >=11) 32.03   AM-PAC Applied Cognition Inpatient   Following a Speech/Presentation 3   Understanding Ordinary Conversation 3   Taking Medications 3   Remembering Where Things Are Placed or Put Away 2   Remembering List of 4-5 Errands 2   Taking Care of Complicated Tasks 2   Applied Cognition Raw Score 15   Applied Cognition Standardized Score 33.54     GOALS    - Pt be oriented x4 for all OT sessions with use of environmental cues as appropriate.      - Pt will be attentive 100% of the time during ongoing cognitive assessment w/ G participation to assist w/ safe d/c planning/recommendations (as appropriate, may be limited by speech deficits)     - Pt will attend to a functional activity for at least 10 minutes with no more than 2 cues for attention/redirection.     - Pt will complete UB self care tasks w/ S w/ use of DME/AD as appropriate.     - Pt will complete LB self care tasks w/ MIN A with use of DME/AD as appropriate.     - Pt will improve bed mobility to S with use of compensatory strategies as appropriate.     - Pt will demonstrate G carryover of pt/caregiver education and training as appropriate w/o cues w/ good tolerance to increase safety during functional tasks     - Pt  will maintain sitting upright with F+ balance for at least 15 minutes while completing a dynamic functional activity with good balance and endurance.     - Pt will engage in ongoing visual perceptual assessments, screenings, and activities to improve ADL performance, as well as to assist in safety/d/c planning.     - Pt will participate in fine/gross motor coordination/strenthening/dexterity exercises in order to increase participation in functional activities.     - Pt will complete toileting w/ MIN A w/ G hygiene/thoroughness using DME as needed    - Pt will improve functional transfers to S on/off all surfaces using DME as needed w/ G balance/safety     - Pt will improve functional mobility during ADL/IADL/leisure tasks to S using DME as needed w/ G balance/safety     - Pt will improve UE strength by 1 MMT grade to improve performance in ADL tasks        Sri Samaniego MS, OTR/L

## 2024-06-17 NOTE — CASE MANAGEMENT
Case Management Discharge Planning Note    Patient name Hemanth Swanson  [id number]  : 1985 Date 2024       Current Admission Date: 6/10/2024  Current Admission Diagnosis:Umbilical hernia without obstruction and without gangrene   Patient Active Problem List    Diagnosis Date Noted Date Diagnosed    Ventilator associated pneumonia (HCC) 2024     Cardiac arrest due to other underlying condition ([facility]) 2024     Seizure-like activity ([facility]) 2024     Bradycardia 2024     Ventilator dependent ([facility]) 2024     Neuromuscular disorder ([facility]) 2024     Obesity hypoventilation syndrome ([facility]) 10/24/2023     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     Psychophysiological insomnia 10/18/2023     Impaired physical mobility 2023     Status post tracheostomy ([facility]) 2023     S/P percutaneous endoscopic gastrostomy (PEG) tube placement ([facility]) 2023     Anxiety 2023     Chronic respiratory failure with hypoxia and hypercapnia ([facility]) 2023     Sepsis ([facility]) 2023     Acute pulmonary embolism without acute cor pulmonale ([facility]) 2023     Depression 2023     Neuromuscular weakness ([facility]) 2023     History of LVH (left ventricular hypertrophy) 2023     Pure hypertriglyceridemia 2023     Unilateral vestibular schwannoma ([facility]) 2023     Port-A-Cath in place 2023     Diplopia 2023     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HCC) 2023     Umbilical hernia without obstruction and without gangrene 12/10/2022     Tobacco use 12/10/2022     Retroperitoneal lymphadenopathy 12/10/2022       LOS (days): 7  Geometric Mean LOS (GMLOS) (days):   Days to GMLOS:     OBJECTIVE:  Risk of Unplanned Readmission Score: 37.95         Current admission status: Inpatient   Preferred Pharmacy:   [facility] #[id number] - [address] [address]  Phone: [phone]  Fax: [phone]    Primary Care Provider: Ela Douglas MD    Primary Insurance: [facility]  Secondary Insurance:     DISCHARGE DETAILS:          Other Referral/Resources/Interventions Provided:  Referral Comments: Notified by provider pt may need tube feeds upon dc. Per provider pt's sister stated pt was not on tube feeds prior to hospitalization.  Notified provider to notify CM if TF's will continue upon dc to be ordered w/in 24-48 hours of dc to ensure supplies are delivered to pt's home prior to dc.  Awaiting response from provider.  Plan for trach upsize tentative 6/18.  Trach supplies will need to ordered prior to dc.

## 2024-06-17 NOTE — PROGRESS NOTES
[facility]  Progress Note: Critical Care  Name: Hemanth Swanson 39 y.o. male I MRN: [id number]  Unit/Bed#: MICU 05 I Date of Admission: 6/10/2024   Date of Service: 6/17/2024 I Hospital Day: 7    Assessment & Plan   Seizure-like activity  S/p cardiac arrest in setting of hypoxemia  Ventilator associated pneumonia s/p treatment (now resolved)  Neuromuscular disorder  Testicular seminoma, s/p ochiectomy/chemotherapy  Tracheostomy and peg dependence  History of pulmonary embolism on Eliquis  Possible CROW     Neuro:   Diagnosis: Seizure-like activity  EEG unremarkable, Keppra discontinued   Brain MRI: no acute intracranial pathology  Neurology following, appreciate recommendations  Diagnosis: Neuromuscular disorder  Patient has completed both outpatient and inpatient workup without any definitive diagnosis  Possible seronegative myasthenia variation due to chemo vs anoxic brain injury  No oral diet for now  Neurology following, appreciate recommendations; now signed off  Diagnosis: Altered mental status   In the setting of hypoxia and bradycardia, patient otherwise alert and oriented; lactic and ammonia obtained on arrival wnl  Plan:   Delirium precautions  Monitor oxygen saturation  Maintain sleep wake cycle     CV:   Diagnosis: Bradycardia  Patient continues with episodes of bradycardia, clinically suspect due to mucous plugging causing hypoxia   Cardiac monitoring  Atropine prn  Diagnosis: s/p cardiac arrest with ROSC  Patient had episode of pulseless asystole without hypoxia, ROSC obtained after 2 rounds ACLS with clearance of secretions on 5/31  Echo 6/11 showing EF of 65%  Plan:  Continuous cardiac monitoring  Frequent suctioning  Diagnosis: HFpEF  Echo 6/11 showing EF of 65%  Echo in 8/23 showing EF 60%  Daily weights  Strict Is and Os    Pulm:  Diagnosis: Ventilator associated multifocal pneumonia  Plan: Home Trilogy ventilator settings AC/VC /RR 18/Peep 6 + 6 L O2  into ventilator. Uses albuterol q6 and mucomyst q6  Xopenex and hypertonic saline nebs  Cxr 6/11 showed hypoinflation, peep increased to 15  Hypoinflation seen on 611 cxr appears improved on cxr 6/12  Wean peep to goal of 8 and maintain 24 hours prior to trach size change  ENT plan to increase trach size tomorrow; NPO midight  Frequent suctioning  Chest PT  Intermittent episodes of hypoxia  ABG on admission showing elevated PCO2 at 54.2, normal PO2 at 102.5, bicarb elevated at 34.8  Status post bronchoscopy 6/12, 6/16  Improves with bagging  Diagnosis: History pulmonary embolism  Hospitalized for right lower lobe segmental PE in August 2023, on eliquis since discharge  Transitioned to therapeutic Lovenox 6/14 in preparation for trach upsizing     GI:   No acute issues, continue tube feeds. NPO midnight for trach upsizing     :   No acute issues  Limited urine output data, external urinary catheter does not fit properly  Limited urine output, continue to monitor  BUN and creatinine within normal limits, EGFR appears normal  Continue to monitor urine output     F/E/N:   F: none  E: monitor and replete as needed  N: tube feeds: Jevity 1.5 continuous 70 cc/hr w/ 150 cc free water flushes q4h    Heme/Onc:   DVT prophylaxis; Eliquis normally, was on Lovenox prior to trach upsizing (currently on hold for the same)  Leukocytosis in the setting of multifocal pneumonia, now treated  Platelets stable  Hgb stable, no active signs of bleeding  Continue to monitor CBC     Endo:   No acute problems  TSH and am cortisol wnl     ID:   Diagnosis: Ventilator associated multifocal pneumonia  Plan:   Pseudomonas and serretia grown from bronchial washings  Patient status post 10 days antibiotics for treatment of ventilator associated pneumonia  No further plan for antibiotic treatment at this time  Chest x-ray 6/12 appears improved    Disposition: Critical care    ICU Core Measures     Vented Patient  VAP Bundle  VAP bundle ordered      A: Assess, Prevent, and Manage Pain Has pain been assessed? Yes  Need for changes to pain regimen? No   B: Both Spontaneous Awakening Trials (SATs) and Spontaneous Breathing Trials (SBTs) Plan to perform spontaneous awakening trial today? Chronic vent   Plan to perform spontaneous breathing trial today? Chronic vent   Obvious barriers to extubation? No   C: Choice of Sedation RASS Goal: 0 Alert and Calm  Need for changes to sedation or analgesia regimen? No   D: Delirium CAM-ICU: Negative   E: Early Mobility  Plan for early mobility? Yes   F: Family Engagement Plan for family engagement today? Yes       Review of Invasive Devices:      Central access plan:       Prophylaxis:  VTE VTE covered by:    None       Stress Ulcer  not ordered        Significant 24hr Events     24hr events: Patient had repeat NEREIDA yesterday without acute pathology. He was weaned to PEEP of 8 in preparation for trach upsizing and underwent bronchoscopy yesterday pursuant to the same. He did desat once overnight requiring bagging, but this has since improved. He does not endorse any specific complaints this morning.      Subjective     Review of Systems: See HPI for Review of Systems     Objective                            Vitals I/O      Most Recent Min/Max in 24hrs   Temp 98.2 °F (36.8 °C) Temp  Min: 98.1 °F (36.7 °C)  Max: 98.4 °F (36.9 °C)   Pulse 76 Pulse  Min: 45  Max: 91   Resp 13 Resp  Min: 12  Max: 36   /74 BP  Min: 95/54  Max: 179/114   O2 Sat 99 % SpO2  Min: 51 %  Max: 100 %      Intake/Output Summary (Last 24 hours) at 6/17/2024 0535  Last data filed at 6/17/2024 0401  Gross per 24 hour   Intake 2773 ml   Output 715 ml   Net 2058 ml       Diet NPO    Invasive Monitoring           Physical Exam   Physical Exam  Vitals reviewed.   Eyes:      Extraocular Movements: Extraocular movements intact.      Conjunctiva/sclera: Conjunctivae normal.      Pupils: Pupils are equal, round, and reactive to light.   Skin:     General: Skin  is warm and dry.   HENT:      Head: Normocephalic and atraumatic.      Nose: No congestion or rhinorrhea.      Mouth/Throat:      Mouth: Mucous membranes are moist.   Neck:      Trachea: Tracheostomy present.   Cardiovascular:      Rate and Rhythm: Normal rate and regular rhythm.   Musculoskeletal:         General: No swelling or tenderness.      Right lower leg: No edema.      Left lower leg: No edema.   Abdominal: General: Bowel sounds are normal. There is abdominal type feeding tube.There is no distension.      Palpations: Abdomen is soft.      Tenderness: There is no abdominal tenderness.      Hernia: A hernia (Ventral hernia) is present.   Constitutional:       General: He is not in acute distress.     Appearance: He is well-developed. He is ill-appearing (Chronically ill-appearing).   Pulmonary:      Effort: Pulmonary effort is normal. No respiratory distress.      Comments: Coarse, mechanical breath sounds bilaterally  Neurological:      General: No focal deficit present.      Mental Status: He is alert. Mental status is at baseline. He is calm.      Comments: Communicates with head gestures            Diagnostic Studies      EKG: No new  Imaging: I have personally reviewed pertinent reports.       Medications:  Scheduled PRN   chlorhexidine, 15 mL, Q12H ISAEL  FLUoxetine, 10 mg, Daily  guaiFENesin, 200 mg, Q6H  levalbuterol, 1.25 mg, Q6H  JANES ANTIFUNGAL, , BID  scopolamine, 1 patch, Q72H  sodium chloride, 4 mL, Q6H      acetaminophen, 650 mg, Q8H PRN  albuterol, 2.5 mg, Q4H PRN  atropine, 0.5 mg, Once PRN       Continuous          Labs:    CBC    Recent Labs     06/16/24  1756   WBC 7.55   HGB 10.9*   HCT 33.8*        BMP    Recent Labs     06/16/24  1756   SODIUM 136   K 3.5   CL 94*   CO2 39*   AGAP 3*   BUN 12   CREATININE 0.39*   CALCIUM 9.0       Coags    Recent Labs     06/16/24  1756   INR 1.20*        Additional Electrolytes  Recent Labs     06/16/24  1756   MG 2.0   PHOS 3.2          Blood  Gas    No recent results  No recent results LFTs  No recent results    Infectious  No recent results  Glucose  Recent Labs     06/16/24  1756   GLUC 103               Manny Evans MD   sliding scale

## 2024-06-17 NOTE — CASE MANAGEMENT
Case Management Discharge Planning Note    Patient name Hemanth Swanson  [facility] MRN [id number]  : 1985 Date 2024       Current Admission Date: 6/10/2024  Current Admission Diagnosis:Umbilical hernia without obstruction and without gangrene   Patient Active Problem List    Diagnosis Date Noted Date Diagnosed    Ventilator associated pneumonia (HCC) 2024     Cardiac arrest due to other underlying condition ([facility]) 2024     Seizure-like activity ([facility]) 2024     Bradycardia 2024     Ventilator dependent ([facility]) 2024     Neuromuscular disorder ([facility]) 2024     Obesity hypoventilation syndrome ([facility]) 10/24/2023     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     Psychophysiological insomnia 10/18/2023     Impaired physical mobility 2023     Status post tracheostomy ([facility]) 2023     S/P percutaneous endoscopic gastrostomy (PEG) tube placement ([facility]) 2023     Anxiety 2023     Chronic respiratory failure with hypoxia and hypercapnia ([facility]) 2023     Sepsis ([facility]) 2023     Acute pulmonary embolism without acute cor pulmonale ([facility]) 2023     Depression 2023     Neuromuscular weakness ([facility]) 2023     History of LVH (left ventricular hypertrophy) 2023     Pure hypertriglyceridemia 2023     Unilateral vestibular schwannoma ([facility]) 2023     Port-A-Cath in place 2023     Diplopia 2023     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HCC) 2023     Umbilical hernia without obstruction and without gangrene 12/10/2022     Tobacco use 12/10/2022     Retroperitoneal lymphadenopathy 12/10/2022       LOS (days): 7  Geometric Mean LOS (GMLOS) (days):   Days to GMLOS:     OBJECTIVE:  Risk of Unplanned Readmission Score: 38.48         Current admission status: Inpatient   Preferred Pharmacy:   [facility] #[id number] [facility] - [address] [address]  Phone: [phone]  Fax: [phone]    Primary Care Provider: Ela Douglas MD    Primary Insurance: [facility]  Secondary Insurance:     DISCHARGE DETAILS:    Discharge planning discussed with:: Pt's sister Dmitry  Freedom of Choice: Yes  Comments - Freedom of Choice: Discussed FOC  CM contacted family/caregiver?: Yes  Were Treatment Team discharge recommendations reviewed with patient/caregiver?: Yes          Contacts  Patient Contacts: Dmitry Swanson (sister)  Relationship to Patient:: Family  Contact Method: Phone  Phone Number: [phone]  Reason/Outcome: Discharge Planning, Emergency Contact, Continuity of Care              Other Referral/Resources/Interventions Provided:  Referral Comments: Message received via [employer] [facility] declined pt.  Referral sent to additional HHA's for review.

## 2024-06-17 NOTE — QUICK NOTE
Called patient's sister, Dmitry, and provided medical updates.  All questions answered and addressed.

## 2024-06-17 NOTE — RESPIRATORY THERAPY NOTE
RT Ventilator Management Note  Hemanth Swanson 39 y.o. male MRN: [id number]  Unit/Bed#: [facility] 05 Encounter: [id number]      Daily Screen         6/16/2024  0805 6/17/2024  0741          Patient safety screen outcome:: Failed Failed (P)       Not Ready for Weaning due to:: Underline problem not resolved PEEP > 8cmH2O (P)                 Physical Exam:   Assessment Type: (P) Assess only  General Appearance: (P) Awake  Respiratory Pattern: (P) Assisted  Chest Assessment: (P) Chest expansion symmetrical  Bilateral Breath Sounds: Coarse, Diminished, Inspiratory wheezes  Cough: (P) Productive  Suction: (P) Trach      Resp Comments: (P) pt remains on [facility] settings, no wean pt on 8 peep. will continue to monitor pt.

## 2024-06-17 NOTE — PLAN OF CARE
Problem: PHYSICAL THERAPY ADULT  Goal: Performs mobility at highest level of function for planned discharge setting.  See evaluation for individualized goals.  Description: Treatment/Interventions: ADL retraining, Functional transfer training, LE strengthening/ROM, Therapeutic exercise, Endurance training, Cognitive reorientation, Patient/family training, Equipment eval/education, Bed mobility, Gait training, Spoke to nursing, Spoke to case management, OT  Equipment Recommended:  (TBD)       See flowsheet documentation for full assessment, interventions and recommendations.  Note: Prognosis: Good  Problem List: Decreased strength, Decreased range of motion, Decreased endurance, Impaired balance, Decreased mobility, Decreased coordination, Decreased cognition, Impaired judgement, Decreased safety awareness, Pain  Assessment: Pt is a 39 y.o. male seen for PT evaluation s/p admit to [facility] on 6/10/2024.  PT now consulted for assessment of mobility and d/c needs. Pt with OOB to chair orders.  Pts current comorbidities effecting treatment include: ventilator dependent, seizure-like activity, cardia arrest 2/2 underlying condition, hx of anoxic brain injury. Pts current clinical presentation is Unstable/ Unpredictable (high complexity) due to Ongoing medical management for primary dx, Increased reliance on more restrictive AD compared to baseline, Decreased activity tolerance compared to baseline, Fall risk, Increased assistance needed from caregiver at current time, Ongoing telemetry monitoring, Cog status, Increased O2 via NC from pts baseline, Trending lab values, Continuous pulse oximetry monitoring . Prior to admission, pt was living w/ sister in house and reports IND in mobility/ADLs?. Upon evaluation, pt currently is requiring max Ax2 for bed mobility; max Ax2 for transfers. Pt presents at PT eval functioning below baseline and currently w/ overall mobility deficits 2* to: BLE weakness, impaired  balance, decreased endurance, gait deviations, pain, decreased activity tolerance compared to baseline, decreased functional mobility tolerance compared to baseline, decreased safety awareness, impaired judgement, fall risk, decreased cognition. Pt currently at a fall risk 2* to impairments listed above.  Pt will continue to benefit from skilled acute PT interventions to address stated impairments; to maximize functional mobility; for ongoing pt/ family training; and DME needs. At conclusion of PT session pt returned BTB, bed alarm engaged, and all needs in reach with phone and call bell within reach. Pt denies any further questions at this time. The patient's AM-PAC Basic Mobility Inpatient Short Form Raw Score is 7. A Raw score of less than or equal to 16 suggests the patient may benefit from discharge to post-acute rehabilitation services. Please also refer to the recommendation of the Physical Therapist for safe discharge planning. Recommend acute rehab upon hospital D/C.  Barriers to Discharge: Inaccessible home environment, Decreased caregiver support     Rehab Resource Intensity Level, PT: I (Maximum Resource Intensity)    See flowsheet documentation for full assessment.

## 2024-06-18 ENCOUNTER — APPOINTMENT (INPATIENT)
Dept: RADIOLOGY | Facility: HOSPITAL | Age: 39
DRG: 720 | End: 2024-06-18
Payer: COMMERCIAL

## 2024-06-18 PROCEDURE — 71045 X-RAY EXAM CHEST 1 VIEW: CPT

## 2024-06-18 PROCEDURE — 94760 N-INVAS EAR/PLS OXIMETRY 1: CPT

## 2024-06-18 PROCEDURE — 3E0G76Z INTRODUCTION OF NUTRITIONAL SUBSTANCE INTO UPPER GI, VIA NATURAL OR ARTIFICIAL OPENING: ICD-10-PCS | Performed by: RADIOLOGY

## 2024-06-18 PROCEDURE — 0D20XUZ CHANGE FEEDING DEVICE IN UPPER INTESTINAL TRACT, EXTERNAL APPROACH: ICD-10-PCS | Performed by: RADIOLOGY

## 2024-06-18 PROCEDURE — NC001 PR NO CHARGE: Performed by: PHYSICIAN ASSISTANT

## 2024-06-18 PROCEDURE — 94669 MECHANICAL CHEST WALL OSCILL: CPT

## 2024-06-18 PROCEDURE — 49452 REPLACE G-J TUBE PERC: CPT

## 2024-06-18 PROCEDURE — 49450 REPLACE G/C TUBE PERC: CPT | Performed by: RADIOLOGY

## 2024-06-18 PROCEDURE — 99232 SBSQ HOSP IP/OBS MODERATE 35: CPT

## 2024-06-18 PROCEDURE — 94640 AIRWAY INHALATION TREATMENT: CPT

## 2024-06-18 PROCEDURE — 99233 SBSQ HOSP IP/OBS HIGH 50: CPT | Performed by: INTERNAL MEDICINE

## 2024-06-18 PROCEDURE — 94003 VENT MGMT INPAT SUBQ DAY: CPT

## 2024-06-18 RX ORDER — FENTANYL CITRATE 50 UG/ML
100 INJECTION, SOLUTION INTRAMUSCULAR; INTRAVENOUS ONCE
Status: DISCONTINUED | OUTPATIENT
Start: 2024-06-18 | End: 2024-06-22 | Stop reason: HOSPADM

## 2024-06-18 RX ORDER — ENOXAPARIN SODIUM 150 MG/ML
1 INJECTION SUBCUTANEOUS EVERY 12 HOURS SCHEDULED
Status: DISCONTINUED | OUTPATIENT
Start: 2024-06-18 | End: 2024-06-19

## 2024-06-18 RX ORDER — GLYCOPYRROLATE 0.2 MG/ML
0.1 INJECTION INTRAMUSCULAR; INTRAVENOUS EVERY 4 HOURS PRN
Status: DISCONTINUED | OUTPATIENT
Start: 2024-06-18 | End: 2024-06-22 | Stop reason: HOSPADM

## 2024-06-18 RX ORDER — SODIUM CHLORIDE, SODIUM GLUCONATE, SODIUM ACETATE, POTASSIUM CHLORIDE, MAGNESIUM CHLORIDE, SODIUM PHOSPHATE, DIBASIC, AND POTASSIUM PHOSPHATE .53; .5; .37; .037; .03; .012; .00082 G/100ML; G/100ML; G/100ML; G/100ML; G/100ML; G/100ML; G/100ML
1000 INJECTION, SOLUTION INTRAVENOUS ONCE
Status: COMPLETED | OUTPATIENT
Start: 2024-06-18 | End: 2024-06-18

## 2024-06-18 RX ORDER — MIDAZOLAM HYDROCHLORIDE 2 MG/2ML
4 INJECTION, SOLUTION INTRAMUSCULAR; INTRAVENOUS ONCE
Status: COMPLETED | OUTPATIENT
Start: 2024-06-18 | End: 2024-06-18

## 2024-06-18 RX ADMIN — MIDAZOLAM 4 MG: 1 INJECTION INTRAMUSCULAR; INTRAVENOUS at 09:57

## 2024-06-18 RX ADMIN — LEVALBUTEROL HYDROCHLORIDE 1.25 MG: 1.25 SOLUTION RESPIRATORY (INHALATION) at 02:57

## 2024-06-18 RX ADMIN — LEVALBUTEROL HYDROCHLORIDE 1.25 MG: 1.25 SOLUTION RESPIRATORY (INHALATION) at 07:04

## 2024-06-18 RX ADMIN — ENOXAPARIN SODIUM 120 MG: 120 INJECTION SUBCUTANEOUS at 20:05

## 2024-06-18 RX ADMIN — CHLORHEXIDINE GLUCONATE 0.12% ORAL RINSE 15 ML: 1.2 LIQUID ORAL at 20:04

## 2024-06-18 RX ADMIN — MICONAZOLE NITRATE 1 APPLICATION: 20 CREAM TOPICAL at 08:33

## 2024-06-18 RX ADMIN — ENOXAPARIN SODIUM 120 MG: 120 INJECTION SUBCUTANEOUS at 15:45

## 2024-06-18 RX ADMIN — SODIUM CHLORIDE SOLN NEBU 3% 4 ML: 3 NEBU SOLN at 19:42

## 2024-06-18 RX ADMIN — IOHEXOL 30 ML: 300 INJECTION, SOLUTION INTRAVENOUS at 15:32

## 2024-06-18 RX ADMIN — SODIUM CHLORIDE SOLN NEBU 3% 4 ML: 3 NEBU SOLN at 13:37

## 2024-06-18 RX ADMIN — LEVALBUTEROL HYDROCHLORIDE 1.25 MG: 1.25 SOLUTION RESPIRATORY (INHALATION) at 19:42

## 2024-06-18 RX ADMIN — GUAIFENESIN 200 MG: 100 SOLUTION ORAL at 20:05

## 2024-06-18 RX ADMIN — MICONAZOLE NITRATE 1 APPLICATION: 20 CREAM TOPICAL at 17:18

## 2024-06-18 RX ADMIN — LEVALBUTEROL HYDROCHLORIDE 1.25 MG: 1.25 SOLUTION RESPIRATORY (INHALATION) at 13:37

## 2024-06-18 RX ADMIN — SODIUM CHLORIDE SOLN NEBU 3% 4 ML: 3 NEBU SOLN at 02:57

## 2024-06-18 RX ADMIN — CHLORHEXIDINE GLUCONATE 0.12% ORAL RINSE 15 ML: 1.2 LIQUID ORAL at 08:27

## 2024-06-18 RX ADMIN — SODIUM CHLORIDE SOLN NEBU 3% 4 ML: 3 NEBU SOLN at 07:04

## 2024-06-18 RX ADMIN — SODIUM CHLORIDE, SODIUM GLUCONATE, SODIUM ACETATE, POTASSIUM CHLORIDE, MAGNESIUM CHLORIDE, SODIUM PHOSPHATE, DIBASIC, AND POTASSIUM PHOSPHATE 1000 ML: .53; .5; .37; .037; .03; .012; .00082 INJECTION, SOLUTION INTRAVENOUS at 09:35

## 2024-06-18 NOTE — PLAN OF CARE
Problem: INFECTION - ADULT  Goal: Absence or prevention of progression during hospitalization  Description: INTERVENTIONS:  - Assess and monitor for signs and symptoms of infection  - Monitor lab/diagnostic results  - Monitor all insertion sites, i.e. indwelling lines, tubes, and drains  - Monitor endotracheal if appropriate and nasal secretions for changes in amount and color  - [place] appropriate cooling/warming therapies per order  - Administer medications as ordered  - Instruct and encourage patient and family to use good hand hygiene technique  - Identify and instruct in appropriate isolation precautions for identified infection/condition  Outcome: Progressing  Goal: Absence of fever/infection during neutropenic period  Description: INTERVENTIONS:  - Monitor WBC    Outcome: Progressing     Problem: Prexisting or High Potential for Compromised Skin Integrity  Goal: Skin integrity is maintained or improved  Description: INTERVENTIONS:  - Identify patients at risk for skin breakdown  - Assess and monitor skin integrity  - Assess and monitor nutrition and hydration status  - Monitor labs   - Assess for incontinence   - Turn and reposition patient  - Assist with mobility/ambulation  - Relieve pressure over bony prominences  - Avoid friction and shearing  - Provide appropriate hygiene as needed including keeping skin clean and dry  - Evaluate need for skin moisturizer/barrier cream  - Collaborate with interdisciplinary team   - Patient/family teaching  - Consider wound care consult   Outcome: Progressing     Problem: SAFETY ADULT  Goal: Patient will remain free of falls  Description: INTERVENTIONS:  - Educate patient/family on patient safety including physical limitations  - Instruct patient to call for assistance with activity   - Consult OT/PT to assist with strengthening/mobility   - Keep Call bell within reach  - Keep bed low and locked with side rails adjusted as appropriate  - Keep care items and personal  belongings within reach  - Initiate and maintain comfort rounds  - Make Fall Risk Sign visible to staff  - Apply yellow socks and bracelet for high fall risk patients  - Consider moving patient to room near nurses station  Outcome: Progressing  Goal: Maintain or return to baseline ADL function  Description: INTERVENTIONS:  -  Assess patient's ability to carry out ADLs; assess patient's baseline for ADL function and identify physical deficits which impact ability to perform ADLs (bathing, care of mouth/teeth, toileting, grooming, dressing, etc.)  - Assess/evaluate cause of self-care deficits   - Assess range of motion  - Assess patient's mobility; develop plan if impaired  - Assess patient's need for assistive devices and provide as appropriate  - Encourage maximum independence but intervene and supervise when necessary  - Involve family in performance of ADLs  - Assess for home care needs following discharge   - Consider OT consult to assist with ADL evaluation and planning for discharge  - Provide patient education as appropriate  Outcome: Progressing  Goal: Maintains/Returns to pre admission functional level  Description: INTERVENTIONS:  - Perform AM-PAC 6 Click Basic Mobility/ Daily Activity assessment daily.  - Set and communicate daily mobility goal to care team and patient/family/caregiver.   - Collaborate with rehabilitation services on mobility goals if consulted  - Out of bed for toileting  - Record patient progress and toleration of activity level   Outcome: Progressing

## 2024-06-18 NOTE — BRIEF OP NOTE (RAD/CATH)
INTERVENTIONAL RADIOLOGY PROCEDURE NOTE    Date: 6/18/2024    Procedure: Gastrostomy tube replacement  Procedure Summary       Date:  Room / Location:     Anesthesia Start:  Anesthesia Stop:     Procedure:  Diagnosis:     Scheduled Providers:  Responsible Provider:     Anesthesia Type: Not recorded ASA Status: Not recorded            Preoperative diagnosis:   1. Seizure-like activity (HCC)    2. Neuromuscular disorder (HCC)    3. Ventilator associated pneumonia (HCC)    4. Ventilator dependent (HCC)    5. Chronic respiratory failure with hypoxia and hypercapnia (HCC)    6. S/P percutaneous endoscopic gastrostomy (PEG) tube placement (HCC)         Postoperative diagnosis: Same.    Surgeon: Natanael Foster MD     Assistant: Willard CASTREJON.    Blood loss: None    Specimens: None     Findings: Successful 16 Fr G tube replacement.    Complications: None immediate.    Anesthesia: none

## 2024-06-18 NOTE — OCCUPATIONAL THERAPY NOTE
OT CANCEL NOTE    Pt chart reviewed. Pt is currently off the floor in IR. Will continue to follow pt on caseload and see pt when medically stable and as clinically appropriate.       06/18/24 7959   OT Last Visit   OT Visit Date 06/18/24   Note Type   Note Type Cancelled Session   Cancel Reasons Patient off floor/test         Marcy Shetty MS, OTR/L

## 2024-06-18 NOTE — QUICK NOTE
Called patient's sister and reviewed clinical course as well as hopeful plans for discharge tomorrow. All questions answered.

## 2024-06-18 NOTE — CASE MANAGEMENT
Case Management Discharge Planning Note    Patient name Hemanth Swanson  [facility] [id number]  : 1985 Date 2024       Current Admission Date: 6/10/2024  Current Admission Diagnosis:Status post tracheostomy (HCC)   Patient Active Problem List    Diagnosis Date Noted Date Diagnosed    Ventilator associated pneumonia (HCC) 2024     Cardiac arrest due to other underlying condition ([facility]) 2024     Seizure-like activity ([facility]) 2024     Bradycardia 2024     Ventilator dependent ([facility]) 2024     Neuromuscular disorder ([facility]) 2024     Obesity hypoventilation syndrome ([facility]) 10/24/2023     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     Psychophysiological insomnia 10/18/2023     Impaired physical mobility 2023     Status post tracheostomy ([facility]) 2023     S/P percutaneous endoscopic gastrostomy (PEG) tube placement ([facility]) 2023     Anxiety 2023     Chronic respiratory failure with hypoxia and hypercapnia ([facility]) 2023     Sepsis ([facility]) 2023     Acute pulmonary embolism without acute cor pulmonale ([facility]) 2023     Depression 2023     Neuromuscular weakness ([facility]) 2023     History of LVH (left ventricular hypertrophy) 2023     Pure hypertriglyceridemia 2023     Unilateral vestibular schwannoma ([facility]) 2023     Port-A-Cath in place 2023     Diplopia 2023     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HCC) 2023     Umbilical hernia without obstruction and without gangrene 12/10/2022     Tobacco use 12/10/2022     Retroperitoneal lymphadenopathy 12/10/2022       LOS (days): 8  Geometric Mean LOS (GMLOS) (days):   Days to GMLOS:     OBJECTIVE:  Risk of Unplanned Readmission Score: 38.07         Current admission status: Inpatient   Preferred Pharmacy:   [id number]  [place] - [address] [address]  Phone: [phone] Fax: [phone]    Primary  Care Provider: Ela Douglas MD    Primary Insurance: [facility]  Secondary Insurance:     DISCHARGE DETAILS:           Other Referral/Resources/Interventions Provided:  Referral Comments: Tube feed supplies and Wallace lift ordered through [place].

## 2024-06-18 NOTE — PROGRESS NOTES
Progress Note - Wound   Hemanth Swanson 39 y.o. male MRN: [id number]  [facility]: [id number]      Assessment:  Pressure induced deep tissue damage of right thigh  Pressure injury of unspecified location, unstageable  Dermatitis due to moisture  Cutaneous candidiasis  Fecal incontinence  Ambulatory dysfunction      Plan:  Fungal rash present to bilateral groin and bilateral sacral buttocks in the setting of fecal incontinence is improved on assessment today. Continue with JANES antifungal cream BID   Area of scabbing to R knee is resolved. Okay to stop skin prep and apply skin nourishing cream to the skin daily and PRN.  L ear- previously noted area of scabbing which has re-opened to full-thickness unstageable pressure injury, documented as unstageable pressure injury on previous admission at [facility] due patients head positioning (favors left side). Wound is POA. Recommend to stop skin prep to area and apply foam dressing, change every other day. Offload area as much as possible.  Right posterior thigh with DTI-present on admission. Wound is reabsorbing on exam. Will recommend to continue to apply silicone bordered foam dressing to the wound.  Change every other day.  Skin irritation from MASD under patient's trach ties is improved on exam today. Continue with Interdry sheets to be placed to help with moisture to this location.  No clinical s/s of infection present to the above areas  Will to continue with recommended preventative nursing skin care measures- including foam dressings to the b/l heels  Pressure relief- offloading of pressure with turning/repositioning as patient medically tolerates, heel elevation, foam wedges for offloading/repositioning, and waffle cushion to chair.  Nutrition is following  [facility] chat wound care team with questions or concerns.   Routine wound care follow-up while admitted. AVS updated.   Discussed with nursing at bedside.       Subjective:  Wound care  "to see patient for follow-up visit of multiple wounds. Patient remains in MICU on low air loss critical care mattress, per nursing he self-discontinued his peg-tube and is awaiting replacement. No wound related issues per nursing. Patient is incontinent of bowel and bladder, condom catheter in place for management. Frequent loose BMs have resolved per nursing and rectal tube was discontinued by the primary team. Patient is alert and awake with trach in place, dependent for care, max assist of two with turning for the assessment. Foam wedges are in use for repositioning. No reported fever, chills, or increased pain related to the wound.     Objective:      Vitals: Blood pressure 104/63, pulse 56, temperature (!) 96.5 °F (35.8 °C), temperature source Axillary, resp. rate 15, height 6' 4\" (1.93 m), weight 117 kg (258 lb 2.5 oz), SpO2 92%.,Body mass index is 31.42 kg/m².    Focused Physical Exam:  Right knee previously dry scabbed area has resolved.  Presents as dry and intact blanchable pink scar tissue.  No open aspect, redness, or drainage.  Left ear-unstageable pressure injury.  Presents as well-defined shaped full-thickness wound with 100% moist adherent yellow slough.  Wound is producing small amount of serosanguineous/yellow drainage.  Edges fragile attached without maceration.  Periwound is dry and intact.  B/l heels are dry and intact without redness or wounds.  Continue with foam dressings in place.  Right posterior thigh with DTI.  Area presents as 2 well-defined shaped dry and intact blood blisters measured together that appear to be re-absorbing.  Suspect patient may have laid on an object.  No open aspect or drainage.  Periwound is dry and intact.  Bilateral sacral buttocks are dry and intact with fragile pink/hyperpigmented skin and scar tissue.  Noted improving fungal rash present with irregular borders and dry peeling skin.  Skin is no longer macerated and denuded. No open aspect, redness, or " "drainage.    No induration, fluctuance, odor, warmth/temperature differences, redness, or purulence noted to the above mentioned wounds and skin areas assessed. New dressings applied as noted above. Patient tolerated assessment well- no s/s of non-verbal pain or discomfort observed during the encounter.        Lab, Imaging and other studies: I have personally reviewed pertinent reports.        Wound 06/04/24 Pressure Injury Ear Left (Active)   Wound Image   06/18/24 1103   Wound Length (cm) 1 cm 06/18/24 1103   Wound Width (cm) 0.8 cm 06/18/24 1103   Wound Depth (cm) 0.2 cm 06/18/24 1103   Wound 06/04/24 Abrasion(s) Knee Anterior;Right (Active)   Wound Image   06/18/24 1102   Wound 06/11/24 MASD Perineum (Active)   Wound Image   06/18/24 1106   Wound Length (cm) 0 cm 06/18/24 1106   Wound Width (cm) 0 cm 06/18/24 1106   Wound Depth (cm) 0 cm 06/18/24 1106   Wound Surface Area (cm^2) 0 cm^2 06/18/24 1106   Wound Volume (cm^3) 0 cm^3 06/18/24 1106   Calculated Wound Volume (cm^3) 0 cm^3 06/18/24 1106   Wound 06/11/24 Thigh Distal;Posterior;Right (Active)   Wound Image   06/18/24 1106   Wound Length (cm) 3 cm 06/18/24 1106   Wound Width (cm) 2 cm 06/18/24 1106   Wound Depth (cm) 0 cm 06/18/24 1106           Total time spent today:    Total time (face-to-face and non-face-to-face) spent on today's visit was 25 minutes. This includes preparation for the visits (previous wound care notes/images and critical care notes from 6/18/24) performance of a medically appropriate history and examination, and orders for medications/treatments or testing.  Discussed assessment findings, and plan of care/recommendations with patients RN.      AMBER Martinez, BRETT-C, CWON      Portions of the record may have been created with voice recognition software.  Occasional wrong word or \"sound a like\" substitutions may have occurred due to the inherent limitations of voice recognition software.  Read the chart carefully and " recognize, using context, where substitutions have occurred

## 2024-06-18 NOTE — CONSULTS
e-Consult (IPC)  - Interventional Radiology  Hemanth Swanson 39 y.o. male MRN: [id number]  [facility]: [id number]          Interventional Radiology has been consulted to evaluate Hemanth Swanson    We were consulted concerning this patient with dislodged G-tube.    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  Patient has had a prolonged hospital course with asystole on 531 and 2 rounds of CPR with ROSC, seizure-like activity, altered mental status, and bradycardia.  Patient's G-tube was initially placed by IR in September 2023.  Appears last change was in the ED in March of 2024.  Unfortunately patient's G-tube was dislodged overnight and was unable to be replaced at bedside.  Sheppard balloon was placed to maintain tract.  Per notes, Sheppard balloon now will not deflate.  IR was consulted to evaluate patient for G-tube replacement.    -Plan for IR G-tube replacement today, pending IR schedule  -Patient does not need to be NPO  -Remainder of care per primary team    5-10 minutes, >50% of the total time devoted to medical consultative verbal/EMR discussion between providers. Written report will be generated in the EMR.     Thank you for allowing Interventional Radiology to participate in the care of Hemanth Swanson. Please don't hesitate to call or TigerText us with any questions.     Martha Peña PA-C

## 2024-06-18 NOTE — CASE MANAGEMENT
Case Management Discharge Planning Note    Patient name Hemanth Swanson  [facility]N [id number]  : 1985 Date 2024       Current Admission Date: 6/10/2024  Current Admission Diagnosis:Status post tracheostomy (HCC)   Patient Active Problem List    Diagnosis Date Noted Date Diagnosed    Ventilator associated pneumonia (HCC) 2024     Cardiac arrest due to other underlying condition ([facility]) 2024     Seizure-like activity ([facility]) 2024     Bradycardia 2024     Ventilator dependent ([facility]) 2024     Neuromuscular disorder ([facility]) 2024     Obesity hypoventilation syndrome (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) 2023     S/P percutaneous endoscopic gastrostomy (PEG) tube placement ([facility]) 2023     Anxiety 2023     Chronic respiratory failure with hypoxia and hypercapnia ([facility]) 2023     Sepsis ([facility]) 2023     Acute pulmonary embolism without acute cor pulmonale ([facility]) 2023     Depression 2023     Neuromuscular weakness ([facility]) 2023     History of LVH (left ventricular hypertrophy) 2023     Pure hypertriglyceridemia 2023     Unilateral vestibular schwannoma ([facility]) 2023     Port-A-Cath in place 2023     Diplopia 2023     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HCC) 2023     Umbilical hernia without obstruction and without gangrene 12/10/2022     Tobacco use 12/10/2022     Retroperitoneal lymphadenopathy 12/10/2022       LOS (days): 8  Geometric Mean LOS (GMLOS) (days):   Days to GMLOS:     OBJECTIVE:  Risk of Unplanned Readmission Score: 38.07         Current admission status: Inpatient   Preferred Pharmacy:   [id number]  [place] - [address] [address]  Phone: [phone] Fax: [phone]    Primary  Care Provider: Ela Douglas MD    [facility]  Secondary Insurance:     DISCHARGE DETAILS:    Discharge planning discussed with:: Pt's sister Dmitry  Freedom of Choice: Yes  Comments - Freedom of Choice: Discussed FOC  CM contacted family/caregiver?: Yes  Were Treatment Team discharge recommendations reviewed with patient/caregiver?: Yes  Did patient/caregiver verbalize understanding of patient care needs?: Yes  Were patient/caregiver advised of the risks associated with not following Treatment Team discharge recommendations?: Yes    Contacts  Patient Contacts: Dmitry Wilburntristongwendolyn (sister)  Relationship to Patient:: Family  Contact Method: Phone  Phone Number: [phone]  Reason/Outcome: Discharge Planning, Continuity of Care              Other Referral/Resources/Interventions Provided:  Referral Comments: Notified by provider anticipate pt clear for dc pending trach upsize & g-tube replacement.  Pt will need tube feeds ordered and trach supplies. Called Hilario russ/[employer] to notify of same - stated he will email me necessary paperwork to be signed by the provider to order the supplies.  Awaiting final note from IR of g-tube replacement to order tube feed supplies.  SLP re-eval pending.  Notified provider of same.

## 2024-06-18 NOTE — CASE MANAGEMENT
Case Management Discharge Planning Note    [facility] Hemanth Swanson  [id number]  : 1985 Date 2024       Current Admission Date: 6/10/2024  Current Admission Diagnosis:Status post tracheostomy (HCC)   Patient Active Problem List    Diagnosis Date Noted Date Diagnosed    Ventilator associated pneumonia (HCC) 2024     Cardiac arrest due to other underlying condition ([facility]) 2024     Seizure-like activity ([facility]) 2024     Bradycardia 2024     Ventilator dependent ([facility]) 2024     Neuromuscular disorder ([facility]) 2024     Obesity hypoventilation syndrome ([facility]) 10/24/2023     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     Psychophysiological insomnia 10/18/2023     Impaired physical mobility 2023     Status post tracheostomy ([facility]) 2023     S/P percutaneous endoscopic gastrostomy (PEG) tube placement ([facility]) 2023     Anxiety 2023     Chronic respiratory failure with hypoxia and hypercapnia ([facility]) 2023     Sepsis ([facility]) 2023     Acute pulmonary embolism without acute cor pulmonale ([facility]) 2023     Depression 2023     Neuromuscular weakness ([facility]) 2023     History of LVH (left ventricular hypertrophy) 2023     Pure hypertriglyceridemia 2023     Unilateral vestibular schwannoma ([facility]) 2023     Port-A-Cath in place 2023     Diplopia 2023     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HCC) 2023     Umbilical hernia without obstruction and without gangrene 12/10/2022     Tobacco use 12/10/2022     Retroperitoneal lymphadenopathy 12/10/2022       LOS (days): 8  Geometric Mean LOS (GMLOS) (days):   Days to GMLOS:     OBJECTIVE:  Risk of Unplanned Readmission Score: 38.07         Current admission status: Inpatient   Preferred Pharmacy:   [id number]  [place] - [address] [address]  Phone: [phone] Fax: [phone]    Primary  Care Provider: Ela Douglas MD    Primary Insurance: [facility]  Secondary Insurance:     DISCHARGE DETAILS:    Discharge planning discussed with:: Pt's sister Dmitry  Freedom of Choice: Yes  Comments - Freedom of Choice: Discussed FOC  CM contacted family/caregiver?: Yes  Were Treatment Team discharge recommendations reviewed with patient/caregiver?: Yes  Did patient/caregiver verbalize understanding of patient care needs?: Yes  Were patient/caregiver advised of the risks associated with not following Treatment Team discharge recommendations?: Yes    Contacts  Patient Contacts: Dmitry Swanson (sister)  Relationship to Patient:: Family  Contact Method: Phone  Phone Number: [phone]  Reason/Outcome: Discharge Planning, Continuity of Care              Other Referral/Resources/Interventions Provided:  Referral Comments: Called pt's sister to discuss dcp.  Therapy recommends IPR - d/w pt's sister - stated he is not going to any facility for any rehab. Stated they keep trying to get me to put my brother in a facility and she will not.  Explained it would be for short term for rehab to get stronger. Stated they can take care of him at home and he can get therapy in the home. Explained unable to find an accepting A d/t insurance.  Pt's sister stated she will take care of him and do therapy w/pt.  Expressed frustation w/how long pt has been hospitalized - notified MICU Mgr of same.  Notified by provider pt will be on same tube feed regimen as pt is on here upon discharge.  Spoke to pt's sister Dmitry - she is knowledgeable & independent w/his tube feeds.  Pt was on tube feeds in the past for 22 hours/day.  Per provider pt was upsized to plain 8 trach today.  Per RT pt has #8 Shiley.  Notified by CM Mgr to contact Hilario Burkett w/[employer] to assist w/ordering supplies.  Called Hilario [phone] left  to return call to assist w/ordering supplies.  Awaiting call back.

## 2024-06-18 NOTE — SEPSIS NOTE
Sepsis Note   Hemanth Swanson 39 y.o. male MRN: [id number]  Unit/Bed#: [facility] 05 Encounter: [id number]       Initial Sepsis Screening       Row Name 06/18/24 1333                Is the patient's history suggestive of a new or worsening infection? No  -ZL        Indicate SIRS criteria Hyperthemia > 38.3C (100.9F) OR Hypothermia <36C (96.8F)  -ZL        Assess for evidence of organ dysfunction: Are any of the below criteria present within 6 hours of suspected infection and SIRS criteria that are NOT considered to be chronic conditions? --                  User Key  (r) = Recorded By, (t) = Taken By, (c) = Cosigned By      Initials Name Provider Type    ZL Manny Evans MD Resident                    Sepsis time zero fired for low temp and bradycardia. Low temp appears to be an anomalous reading and patient's bradycardia has been consistent throughout his admission. Patient has already completed treatment for VAP this admission. Will continue to monitor clinically for signs of infection.    Body mass index is 31.42 kg/m².  Wt Readings from Last 1 Encounters:   06/18/24 117 kg (258 lb 2.5 oz)        Ideal body weight: 86.8 kg (191 lb 5.7 oz)  Adjusted ideal body weight: 98.9 kg (218 lb 1.3 oz)

## 2024-06-18 NOTE — NUTRITION
Nutrition Follow-Up:       06/18/24 8255   Recommendations/Interventions   Recommendations to Provider Resume previous TF regimen as able once enteral access is replaced - Jevity 1.5 @ 70mL/hr with 150mL water flushes q4 hrs per critical care. Per chart review, pt was on oral diet at home - consider SLP evaluation as medically appropriate.

## 2024-06-18 NOTE — RESPIRATORY THERAPY NOTE
RT Ventilator Management Note  Hemanth Swanson 39 y.o. male MRN: [id number]  [id number]      Daily Screen         6/17/2024  0741 6/18/2024  0805          Patient safety screen outcome:: Failed Failed      Not Ready for Weaning due to:: PEEP > 8cmH2O PEEP > 8cmH2O                Physical Exam:   Assessment Type: Assess only  General Appearance: Awake  Respiratory Pattern: Assisted  Chest Assessment: Chest expansion symmetrical  Bilateral Breath Sounds: Coarse, Diminished  Suction: Trach      Resp Comments: no vent changes at this time. will continue to monitor pt. no wean pt home vent pt.

## 2024-06-18 NOTE — RESPIRATORY THERAPY NOTE
RT Ventilator Management Note  Hemanth Swanson 39 y.o. male MRN: [id number]  [facility]: [id number]      Daily Screen         6/16/2024  0805 6/17/2024  0741          Patient safety screen outcome:: Failed Failed      Not Ready for Weaning due to:: Underline problem not resolved PEEP > 8cmH2O                Physical Exam:   Assessment Type: Assess only  General Appearance: Awake  Respiratory Pattern: Assisted  Chest Assessment: Chest expansion symmetrical  Bilateral Breath Sounds: Coarse, Diminished      Resp Comments: pt remains on listed scmv settings and tolerating well at this time     06/18/24 0430   Respiratory Assessment   Assessment Type Assess only   General Appearance Awake   Respiratory Pattern Assisted   Chest Assessment Chest expansion symmetrical   Bilateral Breath Sounds Coarse;Diminished   Resp Comments pt remains on listed scmv settings and tolerating well at this time   Vent Information   Vent ID [id number]   Vent type Hook C3   Hook Vent Mode (S)CMV   $ Vent Daily Charge-Subsequent Yes   $ Pulse Oximetry Spot Check Charge Completed   (S)CMV Settings   Resp Rate (BPM) 12 BPM   VT (mL) 500 mL   FIO2 (%) 50 %   PEEP (cmH2O) 8 cmH2O   I:E Ratio 1/4.6   Insp Time (%) 0.9 %   Flow Trigger (LPM) 2.5   Humidification Heater   Heater Temperature (Set) 87.8 °F (31 °C)   (S)CMV Actuals   Resp Rate (BPM) 12 BPM   VT (mL) 480   MV 5.7   Peak Pressure (cmH2O) 26 cmH2O   I:E Ratio (Obs) 1/4.6   Insp Resistance 23   Heater Temperature (Obs) 87.8 °F (31 °C)   (S)CMV Alarms   High Peak Pressure (cmH2O) 50   Low Pressure (cmH2O) 5 cm H2O   High Resp Rate (BPM) 40 BPM   Low Resp Rate (BPM) 8 BPM   High MV (L/min) 20 L/min   Low MV (L/min) 3 L/min   High VT (mL) 1000 mL   Low VT (mL) 250 mL   Apnea Time (s) 20 S   Maintenance   Alarm (pink) cable attached No   Resuscitation bag with peep valve at bedside Yes   Water bag changed No   Circuit changed No   IHI Ventilator Associated Pneumonia  Bundle   Head of Bed Elevated HOB 30   Surgical Airway Distal;Cuffed;Other (Comment)   Placement Date/Time: 06/01/24 1155   Tube Size: 6  Placed By: Physician  Placed by (Name): Dr. Ronquillo  Type: Tracheostomy  Style: (c) Distal;Cuffed;Other (Comment)   Status Cuff Inflated   Site Assessment Dry   Surgical Airway Cuff Pressure (color) Green   Equipment at bedside BVM;Wall Suction setup;Additional complete same size trach tube;Additional complete one size smaller trach tube;Obturator;Additional inner cannula

## 2024-06-18 NOTE — QUICK NOTE
Called to evaluate patient after he removed his gastrostomy tube. Per chart review, had G-tube placed in 9/2023 by IR. Attempted to replace G-tube, but unable to pass. Reached out to on-call surgery, who recommended puga placement prior to their evaluation. Puga balloon function tested before placement. Upon surgery team arrival, puga balloon would no longer deflate. Suspect mechanical defect in puga. Will need IR evaluation for puga removal and G-tube replacement. Patient currently NPO for trach up-sizing.

## 2024-06-18 NOTE — PROGRESS NOTES
"Otolaryngology HN/FPRS Progress Note:    Subjective: No acute events overnight.   Remains on vent, PEEP 8, FiO2 50%    Objective:   /63 (BP Location: Left arm)   Pulse 56   Temp (!) 96.5 °F (35.8 °C) (Axillary)   Resp 15   Ht 6' 4\" (1.93 m)   Wt 117 kg (258 lb 2.5 oz)   SpO2 92%   BMI 31.42 kg/m²     Physical Exam   Gen: NAD, A/O x 3.  Neuro: CN 2-12 intact except as below  Neck: 6-0 XLT distal cuffed Shiley trach in place, cuff up  Lungs: vent   CV: Good distal perfusion    Procedures:  1. Trach tube changed to Shiley 8-0 Cuffed tracheostomy tube    The patient tolerated the procedure well.     Assessment/Plan: Patient with tracheostomy size concerns. Stable overnight. Trach changed successfully from shiley 6-0 distal XLT cuffed to shiley 8-0 cuffed at bedside this AM.    -Have obturator on patient's chart and spare trach with patient at all times, on and off the floor  -Please have same size and one size smaller tracheostomy tube with patient at all times  -Q4/PRN tracheal suctioning with RT and nursing  -Q24/PRN inner canula change  -Patient is not safe for PMV while cuff is inflated, PMV care/safety per SLP     -rest of care per primary    ENT will sign off at this time. Please reach out or re-consult with questions/concerns    Dispo: ICU     Latasha Antoine MD  "

## 2024-06-18 NOTE — PROGRESS NOTES
[facility]  Progress Note: Critical Care  Name: Hemanth Swanson 39 y.o. male I MRN: [id number]  Unit/Bed#: MICU 05 I Date of Admission: 6/10/2024   Date of Service: 6/18/2024 I Hospital Day: 8    Assessment & Plan   Seizure-like activity  S/p cardiac arrest in setting of hypoxemia  Ventilator associated pneumonia s/p treatment (now resolved)  Neuromuscular disorder  Testicular seminoma, s/p ochiectomy/chemotherapy  Tracheostomy and peg dependence, complicated by PEG tube dislodgment  History of pulmonary embolism on Eliquis  Possible CROW     Neuro:   Diagnosis: Seizure-like activity  EEG unremarkable, Keppra discontinued   Brain MRI: no acute intracranial pathology  Neurology following, appreciate recommendations  Diagnosis: Neuromuscular disorder  Patient has completed both outpatient and inpatient workup without any definitive diagnosis  Possible seronegative myasthenia variation due to chemo vs anoxic brain injury  No oral diet for now; plan for repeat Speech eval later today  Neurology following, appreciate recommendations; now signed off  Diagnosis: Altered mental status   In the setting of hypoxia and bradycardia, patient otherwise alert and oriented; lactic and ammonia obtained on arrival wnl  Plan:   Delirium precautions  Monitor oxygen saturation  Maintain sleep wake cycle     CV:   Diagnosis: Bradycardia  Patient continues with episodes of bradycardia, clinically suspect due to mucous plugging causing hypoxia   Cardiac monitoring  Atropine prn  Diagnosis: s/p cardiac arrest with ROSC  Patient had episode of pulseless asystole without hypoxia, ROSC obtained after 2 rounds ACLS with clearance of secretions on 5/31  Echo 6/11 showing EF of 65%  Plan:  Continuous cardiac monitoring  Frequent suctioning  Diagnosis: HFpEF  Echo 6/11 showing EF of 65%  Echo in 8/23 showing EF 60%  Daily weights  Strict Is and Os     Pulm:  Diagnosis: Ventilator associated multifocal  ROS:  General: no fever, uri   Skin:No other skin complaints today. 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  s/p XRT x 4 , dr Marie Donohue. Age-related osteoporosis without current pathological fracture 2019    -3.1    Carotid stenosis 2017    left 50-69    COVID-19 2023    Diverticulosis of colon 2011    polyps- Dr. Janice Heredia    Hypercholesteremia     Hypertension     Neuropathy     peripheral neuropathy of feet    Polymyalgia rheumatica (CMD)     Thyroid nodule     sees  at [facility]    Vitamin D deficiency      Social History     Socioeconomic History    Marital status: /Civil Union     Spouse name: Not on file    Number of children: 3    Years of education: Not on file    Highest education level: Bachelor's degree (e.g., BA, AB, BS)   Occupational History    Not on file   Tobacco Use    Smoking status: Former     Current packs/day: 0.00     Average packs/day: 0.5 packs/day for 4.0 years (2.0 ttl pk-yrs)     Types: Cigarettes     Start date: 38     Quit date: 67     Years since quittin.3    Smokeless tobacco: Never   Vaping Use    Vaping Use: never used   Substance and Sexual Activity    Alcohol use: No    Drug use: Never    Sexual activity: Not on file   Other Topics Concern    Not on file   Social History Narrative    Not on file     Social Determinants of Health     Financial Resource Strain: Not on file   Food Insecurity: Not on file   Transportation Needs: Not on file   Physical Activity: Not on file   Stress: Not on file   Social Connections: Not on file   Interpersonal Safety: Not on file     New problem     Problem # 1: LESION -back  SUBJECTIVE: It has been present for a few months and has been gradually worsening. Previous treatments: none. Risk factors:none .   Symptoms: itch  OBJECTIVE: right lower back- irritated seborrheic keratosis evenly pigmented, symmetric, sharply marginated ASSESSMENT:irritated seb ker -- New problem    PLAN:   Liquid nitrogen was applied for 10-12 seconds to the 1 lesion(s) and the expected blistering or scabbing reaction explained. Return if lesion(s) fail to fully resolve. Problem # 2:  Skin check/Screening     No other issues/lesions found (see exam)   Counseling given: The nature of sun-induced photo-aging and skin cancers is discussed. Sun avoidance, protective clothing, and the use of SPF sunscreens is advised. Observe closely for skin damage/changes, and call if such occurs. Exam of head, neck, lips, chest, back, abdomen, and both upper extremities reveal no suspicious lesions. inspection of the lids and conjunctiva:normal  well developed, well nourished  alert and oriented x3     I have personally reviewed and analyzed the patient's medical record in detail.      Stas Lindo MD   follow up 1yr or prn pneumonia  Plan: Home Trilogy ventilator settings AC/VC /RR 18/Peep 6 + 6 L O2 into ventilator. Uses albuterol q6 and mucomyst q6  Xopenex and hypertonic saline nebs  Cxr 6/11 showed hypoinflation, peep increased to 15  Hypoinflation seen on 611 cxr appears improved on cxr 6/12  Wean peep to goal of 8 and maintain 24 hours prior to trach size change  ENT plan to increase trach size today; NPO  Frequent suctioning  Chest PT  Intermittent episodes of hypoxia  ABG on admission showing elevated PCO2 at 54.2, normal PO2 at 102.5, bicarb elevated at 34.8  Status post bronchoscopy 6/12, 6/16  Improves with bagging  Diagnosis: History pulmonary embolism  Hospitalized for right lower lobe segmental PE in August 2023, on eliquis since discharge  Transitioned to therapeutic Lovenox 6/14 in preparation for trach upsizing     GI:   PEG tube dislodgement  Overnight into 6/18, patient self-removed PEG tube; Puga placed to maintain tract patency  On surgery evaluation, Puga no longer able to be deflated  Suspect mechanical failure; IR consulted for puga removal and PEG replacement     :   No acute issues  Limited urine output data, external urinary catheter does not fit properly  Limited urine output, continue to monitor  BUN and creatinine within normal limits, EGFR appears normal  Continue to monitor urine output     F/E/N:   F: none  E: monitor and replete as needed  N: tube feeds: Jevity 1.5 continuous 70 cc/hr w/ 150 cc free water flushes q4h (on hold pending procedures today)      Heme/Onc:   DVT prophylaxis; Eliquis normally, was on Lovenox prior to trach upsizing (currently on hold for the same)  Leukocytosis in the setting of multifocal pneumonia, now treated  Platelets stable  Hgb stable, no active signs of bleeding  Continue to monitor CBC     Endo:   No acute problems  TSH and am cortisol wnl     ID:   Diagnosis: Ventilator associated multifocal pneumonia  Plan:   Pseudomonas and serretia grown from bronchial  washings  Patient status post 10 days antibiotics for treatment of ventilator associated pneumonia  No further plan for antibiotic treatment at this time  Chest x-ray 6/12 appears improved    Disposition: Critical care    ICU Core Measures     Vented Patient  VAP Bundle  VAP bundle ordered     A: Assess, Prevent, and Manage Pain Has pain been assessed? Yes  Need for changes to pain regimen? No   B: Both Spontaneous Awakening Trials (SATs) and Spontaneous Breathing Trials (SBTs) Plan to perform spontaneous awakening trial today? N/A   Plan to perform spontaneous breathing trial today? Chronic vent   Obvious barriers to extubation? Yes   C: Choice of Sedation RASS Goal: 0 Alert and Calm  Need for changes to sedation or analgesia regimen? NA   D: Delirium CAM-ICU: Negative   E: Early Mobility  Plan for early mobility? Yes   F: Family Engagement Plan for family engagement today? Yes       Review of Invasive Devices:      Central access plan: Chronic port      Prophylaxis:  VTE VTE covered by:    None       Stress Ulcer  not ordered        Significant 24hr Events     24hr events: Overnight, patient self removed his existing PEG tube.  On discussion with surgery, a temporary Sheppard was placed to help preserve tract patency prior to replacement; however, it was later noted that the Sheppard balloon was unable to be deflated and removed to facilitate exchange.  As such, Sheppard was left in place and plan today is for IR consultation for Sheppard removal/PEG replacement.  This morning, patient reports no acute complaints.     Subjective     Review of Systems: See HPI for Review of Systems     Objective                            Vitals I/O      Most Recent Min/Max in 24hrs   Temp 98.5 °F (36.9 °C) Temp  Min: 97.7 °F (36.5 °C)  Max: 98.5 °F (36.9 °C)   Pulse (!) 52 Pulse  Min: 52  Max: 88   Resp 12 Resp  Min: 10  Max: 30   /74 BP  Min: 101/61  Max: 171/106   O2 Sat 100 % SpO2  Min: 92 %  Max: 100 %      Intake/Output Summary  (Last 24 hours) at 6/18/2024 0611  Last data filed at 6/17/2024 2100  Gross per 24 hour   Intake 1028 ml   Output 200 ml   Net 828 ml       Diet NPO    Invasive Monitoring           Physical Exam   Physical Exam  Vitals reviewed.   Eyes:      Extraocular Movements: Extraocular movements intact.      Conjunctiva/sclera: Conjunctivae normal.      Pupils: Pupils are equal, round, and reactive to light.   Skin:     General: Skin is warm and dry.      Capillary Refill: Capillary refill takes less than 2 seconds.   HENT:      Head: Normocephalic and atraumatic.      Nose: No congestion or rhinorrhea.      Mouth/Throat:      Mouth: Mucous membranes are moist.   Neck:      Trachea: Tracheostomy present.   Cardiovascular:      Rate and Rhythm: Normal rate and regular rhythm.   Musculoskeletal:         General: No swelling or tenderness.      Right lower leg: No edema.      Left lower leg: No edema.   Abdominal: General: Bowel sounds are normal.      Palpations: Abdomen is soft.      Comments: Sheppard in place of prior PEG   Constitutional:       General: He is not in acute distress.     Appearance: He is not ill-appearing.      Interventions: He is restrained.   Pulmonary:      Effort: Pulmonary effort is normal. No respiratory distress.      Comments: Mechanical breath sounds  Neurological:      General: No focal deficit present.      Mental Status: He is alert. He is calm.      Motor: Strength full and intact in all extremities.   Genitourinary/Anorectal:  external catheter present.          Diagnostic Studies      EKG: No new  Imaging: I have personally reviewed pertinent reports.       Medications:  Scheduled PRN   chlorhexidine, 15 mL, Q12H ISAEL  FLUoxetine, 10 mg, Daily  guaiFENesin, 200 mg, Q6H  levalbuterol, 1.25 mg, Q6H  JANES ANTIFUNGAL, , BID  scopolamine, 1 patch, Q72H  sodium chloride, 4 mL, Q6H      acetaminophen, 650 mg, Q8H PRN  albuterol, 2.5 mg, Q4H PRN  atropine, 0.5 mg, Once PRN       Continuous           Labs:    CBC    Recent Labs     06/16/24 1756 06/17/24  0622   WBC 7.55 7.53   HGB 10.9* 11.8*   HCT 33.8* 37.3    244     BMP    Recent Labs     06/16/24 1756 06/17/24  0622   SODIUM 136 138   K 3.5 4.2   CL 94* 93*   CO2 39* 37*   AGAP 3* 8   BUN 12 11   CREATININE 0.39* 0.36*   CALCIUM 9.0 9.3       Coags    Recent Labs     06/16/24 1756   INR 1.20*        Additional Electrolytes  Recent Labs     06/16/24 1756 06/17/24  0622   MG 2.0 2.1   PHOS 3.2 3.5          Blood Gas    No recent results  No recent results LFTs  No recent results    Infectious  No recent results  Glucose  Recent Labs     06/16/24 1756 06/17/24  0622   GLUC 103 88               Manny Evans MD

## 2024-06-19 ENCOUNTER — APPOINTMENT (INPATIENT)
Dept: RADIOLOGY | Facility: HOSPITAL | Age: 39
DRG: 720 | End: 2024-06-19
Payer: COMMERCIAL

## 2024-06-19 LAB
ANION GAP SERPL CALCULATED.3IONS-SCNC: 4 MMOL/L (ref 4–13)
BASOPHILS # BLD AUTO: 0.02 THOUSANDS/ÂΜL (ref 0–0.1)
BASOPHILS NFR BLD AUTO: 0 % (ref 0–1)
BUN SERPL-MCNC: 12 MG/DL (ref 5–25)
CALCIUM SERPL-MCNC: 9 MG/DL (ref 8.4–10.2)
CHLORIDE SERPL-SCNC: 98 MMOL/L (ref 96–108)
CO2 SERPL-SCNC: 36 MMOL/L (ref 21–32)
CREAT SERPL-MCNC: 0.46 MG/DL (ref 0.6–1.3)
DME PARACHUTE DELIVERY DATE ACTUAL: NORMAL
DME PARACHUTE DELIVERY DATE EXPECTED: NORMAL
DME PARACHUTE DELIVERY DATE REQUESTED: NORMAL
DME PARACHUTE DELIVERY NOTE: NORMAL
DME PARACHUTE ITEM DESCRIPTION: NORMAL
DME PARACHUTE ITEM DESCRIPTION: NORMAL
DME PARACHUTE ORDER STATUS: NORMAL
DME PARACHUTE SUPPLIER NAME: NORMAL
DME PARACHUTE SUPPLIER PHONE: NORMAL
EOSINOPHIL # BLD AUTO: 0.06 THOUSAND/ÂΜL (ref 0–0.61)
EOSINOPHIL NFR BLD AUTO: 1 % (ref 0–6)
ERYTHROCYTE [DISTWIDTH] IN BLOOD BY AUTOMATED COUNT: 16 % (ref 11.6–15.1)
GFR SERPL CREATININE-BSD FRML MDRD: 143 ML/MIN/1.73SQ M
GLUCOSE SERPL-MCNC: 96 MG/DL (ref 65–140)
HCT VFR BLD AUTO: 37.1 % (ref 36.5–49.3)
HGB BLD-MCNC: 12 G/DL (ref 12–17)
IMM GRANULOCYTES # BLD AUTO: 0.08 THOUSAND/UL (ref 0–0.2)
IMM GRANULOCYTES NFR BLD AUTO: 1 % (ref 0–2)
LYMPHOCYTES # BLD AUTO: 0.93 THOUSANDS/ÂΜL (ref 0.6–4.47)
LYMPHOCYTES NFR BLD AUTO: 15 % (ref 14–44)
MCH RBC QN AUTO: 30.1 PG (ref 26.8–34.3)
MCHC RBC AUTO-ENTMCNC: 32.3 G/DL (ref 31.4–37.4)
MCV RBC AUTO: 93 FL (ref 82–98)
MONOCYTES # BLD AUTO: 0.45 THOUSAND/ÂΜL (ref 0.17–1.22)
MONOCYTES NFR BLD AUTO: 8 % (ref 4–12)
NEUTROPHILS # BLD AUTO: 4.48 THOUSANDS/ÂΜL (ref 1.85–7.62)
NEUTS SEG NFR BLD AUTO: 75 % (ref 43–75)
NRBC BLD AUTO-RTO: 0 /100 WBCS
PLATELET # BLD AUTO: 244 THOUSANDS/UL (ref 149–390)
PMV BLD AUTO: 9.9 FL (ref 8.9–12.7)
POTASSIUM SERPL-SCNC: 4.1 MMOL/L (ref 3.5–5.3)
RBC # BLD AUTO: 3.99 MILLION/UL (ref 3.88–5.62)
SODIUM SERPL-SCNC: 138 MMOL/L (ref 135–147)
WBC # BLD AUTO: 6.02 THOUSAND/UL (ref 4.31–10.16)

## 2024-06-19 PROCEDURE — 92610 EVALUATE SWALLOWING FUNCTION: CPT

## 2024-06-19 PROCEDURE — 71045 X-RAY EXAM CHEST 1 VIEW: CPT

## 2024-06-19 PROCEDURE — 94640 AIRWAY INHALATION TREATMENT: CPT

## 2024-06-19 PROCEDURE — 94760 N-INVAS EAR/PLS OXIMETRY 1: CPT

## 2024-06-19 PROCEDURE — 31502 CHANGE OF WINDPIPE AIRWAY: CPT | Performed by: OTOLARYNGOLOGY

## 2024-06-19 PROCEDURE — 94003 VENT MGMT INPAT SUBQ DAY: CPT

## 2024-06-19 PROCEDURE — 80048 BASIC METABOLIC PNL TOTAL CA: CPT

## 2024-06-19 PROCEDURE — 99233 SBSQ HOSP IP/OBS HIGH 50: CPT | Performed by: INTERNAL MEDICINE

## 2024-06-19 PROCEDURE — 85025 COMPLETE CBC W/AUTO DIFF WBC: CPT

## 2024-06-19 PROCEDURE — 0B21XFZ CHANGE TRACHEOSTOMY DEVICE IN TRACHEA, EXTERNAL APPROACH: ICD-10-PCS | Performed by: OTOLARYNGOLOGY

## 2024-06-19 RX ORDER — ATROPINE SULFATE 0.1 MG/ML
INJECTION INTRAVENOUS
Status: COMPLETED
Start: 2024-06-19 | End: 2024-06-19

## 2024-06-19 RX ORDER — ONDANSETRON 2 MG/ML
INJECTION INTRAMUSCULAR; INTRAVENOUS
Status: COMPLETED
Start: 2024-06-19 | End: 2024-06-19

## 2024-06-19 RX ORDER — ACETAMINOPHEN 10 MG/ML
1000 INJECTION, SOLUTION INTRAVENOUS EVERY 6 HOURS PRN
Status: DISCONTINUED | OUTPATIENT
Start: 2024-06-19 | End: 2024-06-22 | Stop reason: HOSPADM

## 2024-06-19 RX ORDER — ONDANSETRON 2 MG/ML
4 INJECTION INTRAMUSCULAR; INTRAVENOUS EVERY 6 HOURS PRN
Status: DISCONTINUED | OUTPATIENT
Start: 2024-06-19 | End: 2024-06-22 | Stop reason: HOSPADM

## 2024-06-19 RX ADMIN — FLUOXETINE HYDROCHLORIDE 10 MG: 10 CAPSULE ORAL at 08:30

## 2024-06-19 RX ADMIN — LEVALBUTEROL HYDROCHLORIDE 1.25 MG: 1.25 SOLUTION RESPIRATORY (INHALATION) at 13:40

## 2024-06-19 RX ADMIN — CHLORHEXIDINE GLUCONATE 0.12% ORAL RINSE 15 ML: 1.2 LIQUID ORAL at 20:12

## 2024-06-19 RX ADMIN — APIXABAN 5 MG: 5 TABLET, FILM COATED ORAL at 17:43

## 2024-06-19 RX ADMIN — ONDANSETRON 4 MG: 2 INJECTION INTRAMUSCULAR; INTRAVENOUS at 18:56

## 2024-06-19 RX ADMIN — SODIUM CHLORIDE SOLN NEBU 3% 4 ML: 3 NEBU SOLN at 08:11

## 2024-06-19 RX ADMIN — LEVALBUTEROL HYDROCHLORIDE 1.25 MG: 1.25 SOLUTION RESPIRATORY (INHALATION) at 19:28

## 2024-06-19 RX ADMIN — SODIUM CHLORIDE SOLN NEBU 3% 4 ML: 3 NEBU SOLN at 02:50

## 2024-06-19 RX ADMIN — GUAIFENESIN 200 MG: 100 SOLUTION ORAL at 08:30

## 2024-06-19 RX ADMIN — SODIUM CHLORIDE SOLN NEBU 3% 4 ML: 3 NEBU SOLN at 13:40

## 2024-06-19 RX ADMIN — GUAIFENESIN 200 MG: 100 SOLUTION ORAL at 15:24

## 2024-06-19 RX ADMIN — MICONAZOLE NITRATE: 20 CREAM TOPICAL at 08:30

## 2024-06-19 RX ADMIN — LEVALBUTEROL HYDROCHLORIDE 1.25 MG: 1.25 SOLUTION RESPIRATORY (INHALATION) at 08:10

## 2024-06-19 RX ADMIN — SODIUM CHLORIDE SOLN NEBU 3% 4 ML: 3 NEBU SOLN at 19:28

## 2024-06-19 RX ADMIN — GUAIFENESIN 200 MG: 100 SOLUTION ORAL at 03:21

## 2024-06-19 RX ADMIN — APIXABAN 5 MG: 5 TABLET, FILM COATED ORAL at 08:30

## 2024-06-19 RX ADMIN — MICONAZOLE NITRATE: 20 CREAM TOPICAL at 17:43

## 2024-06-19 RX ADMIN — ACETAMINOPHEN 1000 MG: 10 INJECTION INTRAVENOUS at 23:51

## 2024-06-19 RX ADMIN — LEVALBUTEROL HYDROCHLORIDE 1.25 MG: 1.25 SOLUTION RESPIRATORY (INHALATION) at 02:50

## 2024-06-19 RX ADMIN — CHLORHEXIDINE GLUCONATE 0.12% ORAL RINSE 15 ML: 1.2 LIQUID ORAL at 08:30

## 2024-06-19 NOTE — CASE MANAGEMENT
Case Management Discharge Planning Note    Patient name Hemanth Swanson  [id number]  : 1985 Date 2024       Current Admission Date: 6/10/2024  Current Admission Diagnosis:Status post tracheostomy (HCC)   Patient Active Problem List    Diagnosis Date Noted Date Diagnosed    Ventilator associated pneumonia (HCC) 2024     Cardiac arrest due to other underlying condition ([facility]) 2024     Seizure-like activity ([facility]) 2024     Bradycardia 2024     Ventilator dependent ([facility]) 2024     Neuromuscular disorder ([facility]) 2024     Obesity hypoventilation syndrome ([facility]) 10/24/2023     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     Psychophysiological insomnia 10/18/2023     Impaired physical mobility 2023     Status post tracheostomy ([facility]) 2023     S/P percutaneous endoscopic gastrostomy (PEG) tube placement ([facility]) 2023     Anxiety 2023     Chronic respiratory failure with hypoxia and hypercapnia ([facility]) 2023     Sepsis ([facility]) 2023     Acute pulmonary embolism without acute cor pulmonale ([facility]) 2023     Depression 2023     Neuromuscular weakness ([facility]) 2023     History of LVH (left ventricular hypertrophy) 2023     Pure hypertriglyceridemia 2023     Unilateral vestibular schwannoma ([facility]) 2023     Port-A-Cath in place 2023     Diplopia 2023     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HCC) 2023     Umbilical hernia without obstruction and without gangrene 12/10/2022     Tobacco use 12/10/2022     Retroperitoneal lymphadenopathy 12/10/2022       LOS (days): 9  Geometric Mean LOS (GMLOS) (days):   Days to GMLOS:     OBJECTIVE:  Risk of Unplanned Readmission Score: 35.65         Current admission status: Inpatient   Preferred Pharmacy:   [id number]  [place] - [address] [address]  Phone: [phone] Fax: [phone]    Primary  Care Provider: Ela Douglas MD    Primary Insurance: [facility]  Secondary Insurance:     DISCHARGE DETAILS:     Other Referral/Resources/Interventions Provided:  Referral Comments: Provider completed orders for pt's trach supplies.  Emailed securely to Hilario russ/[employer] along w/the item # for the trach [id number].  Item # for inner cannula is [id number]. TC to Hilario to verify receipt of orders - confirmed receipt & he will order supplies for pt - no need to enter order in [employer].  Will arrange transportation once confirmation is received items have been delivered.  Per chart review: ST boss completed today - pt clear for puree diet w/clear liquids.  Message sent to provider re: update of diet orders as tube feeds were ordered yesterday.  Stated pt's sister would like to have the tube feeds available PRN for the days pt's oral intake is not good.  Message sent in [facility] notifying DME of same.

## 2024-06-19 NOTE — CASE MANAGEMENT
Case Management Discharge Planning Note    Patient name Hemanth Swanson  [id number]  : 1985 Date 2024       Current Admission Date: 6/10/2024  Current Admission Diagnosis:Status post tracheostomy (HCC)   Patient Active Problem List    Diagnosis Date Noted Date Diagnosed    Ventilator associated pneumonia (HCC) 2024     Cardiac arrest due to other underlying condition ([facility]) 2024     Seizure-like activity ([facility]) 2024     Bradycardia 2024     Ventilator dependent ([facility]) 2024     Neuromuscular disorder ([facility]) 2024     Obesity hypoventilation syndrome ([facility]) 10/24/2023     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     Psychophysiological insomnia 10/18/2023     Impaired physical mobility 2023     Status post tracheostomy ([facility]) 2023     S/P percutaneous endoscopic gastrostomy (PEG) tube placement ([facility]) 2023     Anxiety 2023     Chronic respiratory failure with hypoxia and hypercapnia ([facility]) 2023     Sepsis ([facility]) 2023     Acute pulmonary embolism without acute cor pulmonale ([facility]) 2023     Depression 2023     Neuromuscular weakness ([facility]) 2023     History of LVH (left ventricular hypertrophy) 2023     Pure hypertriglyceridemia 2023     Unilateral vestibular schwannoma ([facility]) 2023     Port-A-Cath in place 2023     Diplopia 2023     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HCC) 2023     Umbilical hernia without obstruction and without gangrene 12/10/2022     Tobacco use 12/10/2022     Retroperitoneal lymphadenopathy 12/10/2022       LOS (days): 9  Geometric Mean LOS (GMLOS) (days):   Days to GMLOS:     OBJECTIVE:  Risk of Unplanned Readmission Score: 35.65         Current admission status: Inpatient   Preferred Pharmacy:   [id number]  [place] - [address] [address]  Phone: [phone] Fax: [phone]    Primary  Care Provider: Ela Douglas MD    Primary Insurance: [facility]  Secondary Insurance:     DISCHARGE DETAILS:            Other Referral/Resources/Interventions Provided:  Referral Comments: Notified by provider pt is going to be re-evaled by ENT - he may need a different size trach.  Notified by provider to complete the orders for trach supplies once it is determined what trach size the pt will need.  Paperwork given to MICU Charge RN to be completed & signed by provider.  Will need to submit signed orders to Hilario Burkett @ [email or web address] @ [email or web address].   Per provider anticipate dc in 24-48 hours - explained to provider we will need to confirm delivery of trach supplies, nicky lift & tube feed supplies prior to discharging pt home. Called David russ/[facility] [phone] to try to arrange home SN and PT/OT visits when pt discharges home.  Awaiting call back.

## 2024-06-19 NOTE — PROGRESS NOTES
[facility]  Progress Note: Critical Care  Name: Hemanth Swanson 39 y.o. male I MRN: [id number]  Unit/Bed#: MICU 05 I Date of Admission: 6/10/2024   Date of Service: 6/19/2024 I Hospital Day: 9    Assessment & Plan   Seizure-like activity  S/p cardiac arrest in setting of hypoxemia  Ventilator associated pneumonia s/p treatment (now resolved)  Neuromuscular disorder  Testicular seminoma, s/p ochiectomy/chemotherapy  Tracheostomy and peg dependence, complicated by PEG tube dislodgment  History of pulmonary embolism on Eliquis  Possible CROW     Neuro:   Diagnosis: Seizure-like activity  EEG unremarkable, Keppra discontinued   Brain MRI: no acute intracranial pathology  Neurology following, appreciate recommendations  Diagnosis: Neuromuscular disorder  Patient has completed both outpatient and inpatient workup without any definitive diagnosis  Possible seronegative myasthenia variation due to chemo vs anoxic brain injury  No oral diet for now; plan for repeat Speech eval later today  Neurology following, appreciate recommendations; now signed off  Diagnosis: Altered mental status   In the setting of hypoxia and bradycardia, patient otherwise alert and oriented; lactic and ammonia obtained on arrival wnl  Plan:   Delirium precautions  Monitor oxygen saturation  Maintain sleep wake cycle     CV:   Diagnosis: Bradycardia  Patient continues with episodes of bradycardia, clinically suspect due to mucous plugging causing hypoxia   Cardiac monitoring  Atropine prn  Diagnosis: s/p cardiac arrest with ROSC  Patient had episode of pulseless asystole without hypoxia, ROSC obtained after 2 rounds ACLS with clearance of secretions on 5/31  Echo 6/11 showing EF of 65%  Plan:  Continuous cardiac monitoring  Frequent suctioning  Diagnosis: HFpEF  Echo 6/11 showing EF of 65%  Echo in 8/23 showing EF 60%  Daily weights  Strict Is and Os     Pulm:  Diagnosis: Ventilator associated multifocal  pneumonia  Plan: Home Trilogy ventilator settings AC/VC /RR 18/Peep 6 + 6 L O2 into ventilator. Uses albuterol q6 and mucomyst q6  Xopenex and hypertonic saline nebs  Cxr 6/11 showed hypoinflation, peep increased to 15  Hypoinflation seen on 611 cxr appears improved on cxr 6/12  Wean peep to goal of 8 and maintain 24 hours prior to trach size change  S/p trach upsizing yesterday  Frequent suctioning  Chest PT  Intermittent episodes of hypoxia  ABG on admission showing elevated PCO2 at 54.2, normal PO2 at 102.5, bicarb elevated at 34.8  Status post bronchoscopy 6/12, 6/16  Improves with bagging  Diagnosis: History pulmonary embolism  Hospitalized for right lower lobe segmental PE in August 2023, on eliquis since discharge  Transitioned to therapeutic Lovenox 6/14 in preparation for trach upsizing; switch back to Eliquis today     GI:   PEG tube dislodgement  Overnight into 6/18, patient self-removed PEG tube; Sheppard placed to maintain tract patency  S/p replacement on 6/19 without issue     :   No acute issues  Limited urine output data, external urinary catheter does not fit properly  Limited urine output, continue to monitor  BUN and creatinine within normal limits, EGFR appears normal  Continue to monitor urine output     F/E/N:   F: none; received 1 L bolus yesterday  E: monitor and replete as needed  N: tube feeds: Jevity 1.5 continuous 70 cc/hr w/ 150 cc free water flushes q4h     Heme/Onc:   DVT prophylaxis; Eliquis resuming today  Leukocytosis in the setting of multifocal pneumonia, now treated  Platelets stable  Hgb stable, no active signs of bleeding  Continue to monitor CBC     Endo:   No acute problems  TSH and am cortisol wnl     ID:   Diagnosis: Ventilator associated multifocal pneumonia  Plan:   Pseudomonas and serretia grown from bronchial washings  Patient status post 10 days antibiotics for treatment of ventilator associated pneumonia  No further plan for antibiotic treatment at this  time  Chest x-ray 6/12 appears improved    Disposition: Critical care    ICU Core Measures     Vented Patient  VAP Bundle  VAP bundle ordered     A: Assess, Prevent, and Manage Pain Has pain been assessed? Yes  Need for changes to pain regimen? No   B: Both Spontaneous Awakening Trials (SATs) and Spontaneous Breathing Trials (SBTs) Plan to perform spontaneous awakening trial today? Chronic vent   Plan to perform spontaneous breathing trial today? Chronic vent   Obvious barriers to extubation? Yes   C: Choice of Sedation RASS Goal: 0 Alert and Calm  Need for changes to sedation or analgesia regimen? No   D: Delirium CAM-ICU: Negative   E: Early Mobility  Plan for early mobility? Yes   F: Family Engagement Plan for family engagement today? Yes       Review of Invasive Devices:      Central access plan:  chronic access      Prophylaxis:  VTE VTE covered by:  enoxaparin, Subcutaneous, 120 mg at 06/18/24 2005       Stress Ulcer  not ordered        Significant 24hr Events     24hr events: Trach upsized and PEG replaced yesterday without issue. This morning, patient reports no acute complaints.     Subjective     Review of Systems: See HPI for Review of Systems     Objective                            Vitals I/O      Most Recent Min/Max in 24hrs   Temp 97.6 °F (36.4 °C) Temp  Min: 96.5 °F (35.8 °C)  Max: 97.9 °F (36.6 °C)   Pulse 72 Pulse  Min: 48  Max: 73   Resp 14 Resp  Min: 12  Max: 32   /60 BP  Min: 93/59  Max: 134/93   O2 Sat 95 % SpO2  Min: 91 %  Max: 100 %      Intake/Output Summary (Last 24 hours) at 6/19/2024 0530  Last data filed at 6/19/2024 0400  Gross per 24 hour   Intake 1978 ml   Output 450 ml   Net 1528 ml       Diet Enteral/Parenteral; Tube Feeding No Oral Diet; Jevity 1.5; Continuous; 70; 150; Every 4 hours    Invasive Monitoring           Physical Exam   Physical Exam  Vitals reviewed.   Eyes:      Extraocular Movements: Extraocular movements intact.      Conjunctiva/sclera: Conjunctivae normal.       Pupils: Pupils are equal, round, and reactive to light.   Skin:     General: Skin is warm and dry.   HENT:      Head: Normocephalic and atraumatic.      Nose: No congestion or rhinorrhea.      Mouth/Throat:      Mouth: Mucous membranes are moist.   Neck:      Trachea: Tracheostomy present.   Cardiovascular:      Rate and Rhythm: Normal rate and regular rhythm.   Musculoskeletal:         General: No swelling or tenderness.      Right lower leg: No edema.      Left lower leg: No edema.   Abdominal: General: Bowel sounds are normal. There is abdominal type feeding tube.There is no distension.      Palpations: Abdomen is soft.      Tenderness: There is no abdominal tenderness.      Hernia: A hernia is present.      Comments: PEG tube in place without surrounding bleeding   Constitutional:       General: He is not in acute distress.     Appearance: He is well-developed. He is obese. He is not ill-appearing.      Interventions: He is restrained.   Pulmonary:      Effort: Pulmonary effort is normal. No respiratory distress.      Comments: Coarse, mechanical breath sounds  Neurological:      General: No focal deficit present.      Mental Status: He is alert. He is calm.      Motor: Strength full and intact in all extremities.   Genitourinary/Anorectal:  external catheter present.          Diagnostic Studies      EKG: No new  Imaging: I have personally reviewed pertinent reports.       Medications:  Scheduled PRN   chlorhexidine, 15 mL, Q12H ISAEL  enoxaparin, 1 mg/kg, Q12H ISAEL  fentaNYL, 100 mcg, Once  FLUoxetine, 10 mg, Daily  guaiFENesin, 200 mg, Q6H  levalbuterol, 1.25 mg, Q6H  JANES ANTIFUNGAL, , BID  sodium chloride, 4 mL, Q6H      acetaminophen, 650 mg, Q8H PRN  albuterol, 2.5 mg, Q4H PRN  atropine, 0.5 mg, Once PRN  glycopyrrolate, 0.1 mg, Q4H PRN       Continuous          Labs:    CBC    Recent Labs     06/17/24  0622   WBC 7.53   HGB 11.8*   HCT 37.3        BMP    Recent Labs     06/17/24  0622   SODIUM 138    K 4.2   CL 93*   CO2 37*   AGAP 8   BUN 11   CREATININE 0.36*   CALCIUM 9.3       Coags    No recent results     Additional Electrolytes  Recent Labs     06/17/24  0622   MG 2.1   PHOS 3.5          Blood Gas    No recent results  No recent results LFTs  No recent results    Infectious  No recent results  Glucose  Recent Labs     06/17/24  0622   GLUC 88               Manny Evans MD

## 2024-06-19 NOTE — RESPIRATORY THERAPY NOTE
RT Ventilator Management Note  Hemanth Swanson 39 y.o. male MRN: [id number]  Unit/Bed#: [facility] 05 Encounter: [id number]      Daily Screen         6/17/2024  0741 6/18/2024  0805          Patient safety screen outcome:: Failed Failed      Not Ready for Weaning due to:: PEEP > 8cmH2O PEEP > 8cmH2O                Physical Exam:   Assessment Type: Pre-treatment  General Appearance: Sleeping  Respiratory Pattern: (P) Assisted  Chest Assessment: (P) Chest expansion symmetrical  Bilateral Breath Sounds: (P) Coarse  Suction: Trach      Resp Comments: (P) Pt has been stable on [facility] settings. No distress noted at this time.

## 2024-06-19 NOTE — PROCEDURES
Procedure: Tracheotomy change    Indications: Ongoing tracheotomy management    Findings: successful placement of 8-0 distal XLT    Procedure in detail: After informed verbal consent was obtained the patient, patient was laid in the supine position with neck extended. While holding tracheotomy in place, tracheotomy was unsecured from the neck, cutting all sutures and trach ties. 8-0 Shiley Trach was removed. 8-0 Distal XLT Trach was placed through the tracheotomy tract. Trach was resecured using soft trach ties. Patient tolerated procedure well and returned the care of nursing in stable condition.

## 2024-06-19 NOTE — SPEECH THERAPY NOTE
Speech-Language Pathology Bedside Swallow Evaluation    Patient Name: Hemanth Swanson    Today's Date: 6/19/2024     Problem List  Principal Problem:    Status post tracheostomy (HCC)  Active Problems:    Umbilical hernia without obstruction and without gangrene    Depression    S/P percutaneous endoscopic gastrostomy (PEG) tube placement (HCC)    Ventilator dependent (HCC)    Seizure-like activity (HCC)    Ventilator associated pneumonia (HCC)    Cardiac arrest due to other underlying condition (HCC)    Past Medical History  Past Medical History:   Diagnosis Date    Anxiety     Cancer (HCC)     Depression     Migration of percutaneous endoscopic gastrostomy (PEG) tube (HCC) 09/24/2023    Psychiatric disorder     bipolar     Past Surgical History  Past Surgical History:   Procedure Laterality Date    IR BIOPSY LYMPH NODE  01/20/2023    IR GASTROSTOMY (G) TUBE CHECK/CHANGE/REINSERTION/UPSIZE  6/18/2024    IR GASTROSTOMY TUBE PLACEMENT  09/25/2023    IR LUMBAR PUNCTURE  09/06/2023    IR PORT PLACEMENT  03/14/2023    ORCHIECTOMY Left 12/14/2022    Procedure: ORCHIECTOMY INGUINAL;  Surgeon: Flip Michael MD;  Location:  MAIN OR;  Service: Urology    PEG W/TRACHEOSTOMY PLACEMENT N/A 09/08/2023    Procedure: TRACHEOSTOMY WITH INSERTION PEG TUBE;  Surgeon: Rudy Mcmanus MD;  Location: [facility];  Service: General    TESTICLE SURGERY         Summary  Pt is known to ST from prior admissions. He is vent dependent at baseline, has a PEG tube, and also reportedly takes a partial PO diet at home. Pt presented today with functional s/s suggestive of mild oropharyngeal dysphagia. Symptoms or concerns included decreased mastication and suspected decreased hyolaryngeal elevation upon palpation, in addition to audible swallows. He had adequate bolus retrieval and transfer as well as prompt swallow. Rate of intake was impulsive, and pt would benefit from assistance and supervision during meals. Pt's trach was initially  placed 9/8/23. Most recent MBS was 10/31/23, see below.     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  He presented 5/31 with shortness of breath.  Treated for pneumonia.  Eventually transferred to ICU at [facility].  Had asystole on 5/31- 2 rounds of CPR with ROSC.  Bronchoscopy- Serratia and Pseudomonas.  Initially on ceftriaxone then switched to cefepime--->zosyn due to concern for neurotoxicity.   Has had intermittent bradycardia and hypoxemia.  Requiring ativan, atropine, BVM ventilation.  Suspicion for seizures. Had an EEG at [facility] which was normal.  Neurology at [facility] recommended Brain MRI and continuous EEG so was sent to [facility] MICU.  On arrival he was bradycardic and hypoxemic- required BVM and then returned back to baseline.    Special Studies:  CXR 6/18/24 IMPRESSION:  Tracheostomy tip projects over the lower cervical trachea above the level of the clavicles. Consider repositioning.  Otherwise, stable appearance with low lung volumes and bibasilar multisegmental atelectasis.  MRI brain 6/16/24 IMPRESSION:  No acute intracranial pathology.  Stable mild nonspecific white matter changes likely due to chronic microangiopathy.  MBS 10/31/24  Summary of Assessment:  Oral and Pharyngeal stages of swallowing appeared WFL- mildly decreased laryngeal excursion noted and minimally decreased airway entrance closure  resulting in trace-transient penetration w/ thin liquids by cup. No aspiration observed.   Recommendations:  Diet:Soft diet to start, advance as tolerated  Liquids: Thin liquids  Meds: w/ water   Aspiration Precautions  Results reviewed with: pt and family members who accompanied pt        Social/Education/Vocational Hx:  Pt lives with family    Swallow Information   Current Risks for Dysphagia & Aspiration: known history of dysphagia and trach/vent dependent  Current Diet: NPO   Baseline Diet: thin liquids and soft solids      Baseline Assessment   Behavior/Cognition: alert  Speech/Language Status: able to follow commands, some voicing around trach, mostly gesture yes/no response  Patient Positioning: upright in bed  Pain Status/Interventions/Response to Interventions: No report of or nonverbal indications of pain.       Swallow Mechanism Exam  Facial: symmetrical  Labial: WFL  Lingual: WFL  Velum: symmetrical  Mandible: adequate ROM  Dentition: edentulous  Vocal quality:strained   Volitional Cough: weak   Respiratory Status: on vent (chronic)  Tracheostomy: Shiley #6 cuffed, cuff inflated      Consistencies Assessed and Performance   Consistencies Administered: ice chips, thin liquids, puree, and solid (armin cracker)    Oral Stage: mild and decreased mastication. Bolus formation and transfer were functional with no significant oral residue noted. No overt s/s reduced oral control.    Pharyngeal Stage: mild and suspected decreased hyolaryngeal elevation upon palpation. No coughing, throat clearing, change in vocal quality or respiratory status noted today.     Esophageal Concerns: none reported      Summary and Recommendations (see above)    Results Reviewed with: patient, RN, and MD     Treatment Recommended: yes     Frequency of treatment: 2-3x/week as inpatient, goal is for d/c home today    Dysphagia LTG  -Patient will demonstrate safe and effective oral intake (without overt s/s significant oral/pharyngeal  dysphagia including s/s penetration or aspiration) for the highest appropriate diet level.     Short Term Goals:  -Pt will tolerate Dysphagia 1/pureed diet and thin liquid with no significant s/s oral or pharyngeal dysphagia across 1-3 diagnostic sessions.    -Patient will tolerate trials of upgraded food texture with no significant s/s of oral or pharyngeal dysphagia including aspiration across 1-3 diagnostic sessions.

## 2024-06-19 NOTE — PLAN OF CARE
Problem: Prexisting or High Potential for Compromised Skin Integrity  Goal: Skin integrity is maintained or improved  Description: INTERVENTIONS:  - Identify patients at risk for skin breakdown  - Assess and monitor skin integrity  - Assess and monitor nutrition and hydration status  - Monitor labs   - Assess for incontinence   - Turn and reposition patient  - Assist with mobility/ambulation  - Relieve pressure over bony prominences  - Avoid friction and shearing  - Provide appropriate hygiene as needed including keeping skin clean and dry  - Evaluate need for skin moisturizer/barrier cream  - Collaborate with interdisciplinary team   - Patient/family teaching  - Consider wound care consult   Outcome: Progressing     Problem: PAIN - ADULT  Goal: Verbalizes/displays adequate comfort level or baseline comfort level  Description: Interventions:  - Encourage patient to monitor pain and request assistance  - Assess pain using appropriate pain scale  - Administer analgesics based on type and severity of pain and evaluate response  - Implement non-pharmacological measures as appropriate and evaluate response  - Consider cultural and social influences on pain and pain management  - Notify physician/advanced practitioner if interventions unsuccessful or patient reports new pain  Outcome: Progressing     Problem: INFECTION - ADULT  Goal: Absence or prevention of progression during hospitalization  Description: INTERVENTIONS:  - Assess and monitor for signs and symptoms of infection  - Monitor lab/diagnostic results  - Monitor all insertion sites, i.e. indwelling lines, tubes, and drains  - Monitor endotracheal if appropriate and nasal secretions for changes in amount and color  - [place] appropriate cooling/warming therapies per order  - Administer medications as ordered  - Instruct and encourage patient and family to use good hand hygiene technique  - Identify and instruct in appropriate isolation precautions for  BMI Counseling: Body mass index is 36 25 kg/m²  The BMI is above normal  Nutrition recommendations include encouraging healthy choices of fruits and vegetables, decreasing fast food intake, limiting drinks that contain sugar and moderation in carbohydrate intake  Exercise recommendations include exercising 3-5 times per week  No pharmacotherapy was ordered  Patient referred to PCP due to patient being overweight  identified infection/condition  Outcome: Progressing  Goal: Absence of fever/infection during neutropenic period  Description: INTERVENTIONS:  - Monitor WBC    Outcome: Progressing     Problem: SAFETY ADULT  Goal: Patient will remain free of falls  Description: INTERVENTIONS:  - Educate patient/family on patient safety including physical limitations  - Instruct patient to call for assistance with activity   - Consult OT/PT to assist with strengthening/mobility   - Keep Call bell within reach  - Keep bed low and locked with side rails adjusted as appropriate  - Keep care items and personal belongings within reach  - Initiate and maintain comfort rounds  - Make Fall Risk Sign visible to staff  - Offer Toileting every  Hours, in advance of need  - Initiate/Maintain alarm  - Obtain necessary fall risk management equipment:   - Apply yellow socks and bracelet for high fall risk patients  - Consider moving patient to room near nurses station  Outcome: Progressing  Goal: Maintain or return to baseline ADL function  Description: INTERVENTIONS:  -  Assess patient's ability to carry out ADLs; assess patient's baseline for ADL function and identify physical deficits which impact ability to perform ADLs (bathing, care of mouth/teeth, toileting, grooming, dressing, etc.)  - Assess/evaluate cause of self-care deficits   - Assess range of motion  - Assess patient's mobility; develop plan if impaired  - Assess patient's need for assistive devices and provide as appropriate  - Encourage maximum independence but intervene and supervise when necessary  - Involve family in performance of ADLs  - Assess for home care needs following discharge   - Consider OT consult to assist with ADL evaluation and planning for discharge  - Provide patient education as appropriate  Outcome: Progressing  Goal: Maintains/Returns to pre admission functional level  Description: INTERVENTIONS:  - Perform AM-PAC 6 Click Basic Mobility/ Daily Activity  assessment daily.  - Set and communicate daily mobility goal to care team and patient/family/caregiver.   - Collaborate with rehabilitation services on mobility goals if consulted  - Perform Range of Motion  times a day.  - Reposition patient every  hours.  - Dangle patient  times a day  - Stand patient  times a day  - Ambulate patient times a day  - Out of bed to chair  times a day   - Out of bed for meals times a day  - Out of bed for toileting  - Record patient progress and toleration of activity level   Outcome: Progressing     Problem: DISCHARGE PLANNING  Goal: Discharge to home or other facility with appropriate resources  Description: INTERVENTIONS:  - Identify barriers to discharge w/patient and caregiver  - Arrange for needed discharge resources and transportation as appropriate  - Identify discharge learning needs (meds, wound care, etc.)  - Arrange for interpretive services to assist at discharge as needed  - Refer to Case Management Department for coordinating discharge planning if the patient needs post-hospital services based on physician/advanced practitioner order or complex needs related to functional status, cognitive ability, or social support system  Outcome: Progressing     Problem: Knowledge Deficit  Goal: Patient/family/caregiver demonstrates understanding of disease process, treatment plan, medications, and discharge instructions  Description: Complete learning assessment and assess knowledge base.  Interventions:  - Provide teaching at level of understanding  - Provide teaching via preferred learning methods  Outcome: Progressing     Problem: Nutrition/Hydration-ADULT  Goal: Nutrient/Hydration intake appropriate for improving, restoring or maintaining nutritional needs  Description: Monitor and assess patient's nutrition/hydration status for malnutrition. Collaborate with interdisciplinary team and initiate plan and interventions as ordered.  Monitor patient's weight and dietary intake as  ordered or per policy. Utilize nutrition screening tool and intervene as necessary. Determine patient's food preferences and provide high-protein, high-caloric foods as appropriate.     INTERVENTIONS:  - Monitor oral intake, urinary output, labs, and treatment plans  - Assess nutrition and hydration status and recommend course of action  - Evaluate amount of meals eaten  - Assist patient with eating if necessary   - Allow adequate time for meals  - Recommend/ encourage appropriate diets, oral nutritional supplements, and vitamin/mineral supplements  - Order, calculate, and assess calorie counts as needed  - Recommend, monitor, and adjust tube feedings and TPN/PPN based on assessed needs  - Assess need for intravenous fluids  - Provide specific nutrition/hydration education as appropriate  - Include patient/family/caregiver in decisions related to nutrition  Outcome: Progressing     Problem: SAFETY,RESTRAINT: NV/NON-SELF DESTRUCTIVE BEHAVIOR  Goal: Remains free of harm/injury (restraint for non violent/non self-detsructive behavior)  Description: INTERVENTIONS:  - Instruct patient/family regarding restraint use   - Assess and monitor physiologic and psychological status   - Provide interventions and comfort measures to meet assessed patient needs   - Identify and implement measures to help patient regain control  - Assess readiness for release of restraint   Outcome: Progressing  Goal: Returns to optimal restraint-free functioning  Description: INTERVENTIONS:  - Assess the patient's behavior and symptoms that indicate continued need for restraint  - Identify and implement measures to help patient regain control  - Assess readiness for release of restraint   Outcome: Progressing

## 2024-06-20 LAB
ANION GAP SERPL CALCULATED.3IONS-SCNC: 8 MMOL/L (ref 4–13)
BASOPHILS # BLD AUTO: 0.05 THOUSANDS/ÂΜL (ref 0–0.1)
BASOPHILS NFR BLD AUTO: 1 % (ref 0–1)
BUN SERPL-MCNC: 11 MG/DL (ref 5–25)
CALCIUM SERPL-MCNC: 9.3 MG/DL (ref 8.4–10.2)
CHLORIDE SERPL-SCNC: 99 MMOL/L (ref 96–108)
CO2 SERPL-SCNC: 36 MMOL/L (ref 21–32)
CREAT SERPL-MCNC: 0.53 MG/DL (ref 0.6–1.3)
DME PARACHUTE DELIVERY DATE ACTUAL: NORMAL
DME PARACHUTE DELIVERY DATE EXPECTED: NORMAL
DME PARACHUTE DELIVERY DATE REQUESTED: NORMAL
DME PARACHUTE ITEM DESCRIPTION: NORMAL
DME PARACHUTE ORDER STATUS: NORMAL
DME PARACHUTE SUPPLIER NAME: NORMAL
DME PARACHUTE SUPPLIER PHONE: NORMAL
EOSINOPHIL # BLD AUTO: 0.05 THOUSAND/ÂΜL (ref 0–0.61)
EOSINOPHIL NFR BLD AUTO: 1 % (ref 0–6)
ERYTHROCYTE [DISTWIDTH] IN BLOOD BY AUTOMATED COUNT: 16.1 % (ref 11.6–15.1)
GFR SERPL CREATININE-BSD FRML MDRD: 135 ML/MIN/1.73SQ M
GLUCOSE SERPL-MCNC: 91 MG/DL (ref 65–140)
HCT VFR BLD AUTO: 37.9 % (ref 36.5–49.3)
HGB BLD-MCNC: 11.8 G/DL (ref 12–17)
IMM GRANULOCYTES # BLD AUTO: 0.07 THOUSAND/UL (ref 0–0.2)
IMM GRANULOCYTES NFR BLD AUTO: 1 % (ref 0–2)
LYMPHOCYTES # BLD AUTO: 1.27 THOUSANDS/ÂΜL (ref 0.6–4.47)
LYMPHOCYTES NFR BLD AUTO: 20 % (ref 14–44)
MCH RBC QN AUTO: 29.6 PG (ref 26.8–34.3)
MCHC RBC AUTO-ENTMCNC: 31.1 G/DL (ref 31.4–37.4)
MCV RBC AUTO: 95 FL (ref 82–98)
MONOCYTES # BLD AUTO: 0.69 THOUSAND/ÂΜL (ref 0.17–1.22)
MONOCYTES NFR BLD AUTO: 11 % (ref 4–12)
NEUTROPHILS # BLD AUTO: 4.16 THOUSANDS/ÂΜL (ref 1.85–7.62)
NEUTS SEG NFR BLD AUTO: 66 % (ref 43–75)
NRBC BLD AUTO-RTO: 0 /100 WBCS
PLATELET # BLD AUTO: 255 THOUSANDS/UL (ref 149–390)
PMV BLD AUTO: 10.2 FL (ref 8.9–12.7)
POTASSIUM SERPL-SCNC: 3.8 MMOL/L (ref 3.5–5.3)
RBC # BLD AUTO: 3.98 MILLION/UL (ref 3.88–5.62)
SODIUM SERPL-SCNC: 143 MMOL/L (ref 135–147)
WBC # BLD AUTO: 6.29 THOUSAND/UL (ref 4.31–10.16)

## 2024-06-20 PROCEDURE — 97110 THERAPEUTIC EXERCISES: CPT

## 2024-06-20 PROCEDURE — 97112 NEUROMUSCULAR REEDUCATION: CPT

## 2024-06-20 PROCEDURE — 84402 ASSAY OF FREE TESTOSTERONE: CPT | Performed by: INTERNAL MEDICINE

## 2024-06-20 PROCEDURE — 94640 AIRWAY INHALATION TREATMENT: CPT

## 2024-06-20 PROCEDURE — 97535 SELF CARE MNGMENT TRAINING: CPT

## 2024-06-20 PROCEDURE — 85025 COMPLETE CBC W/AUTO DIFF WBC: CPT

## 2024-06-20 PROCEDURE — 99233 SBSQ HOSP IP/OBS HIGH 50: CPT | Performed by: INTERNAL MEDICINE

## 2024-06-20 PROCEDURE — 84403 ASSAY OF TOTAL TESTOSTERONE: CPT | Performed by: INTERNAL MEDICINE

## 2024-06-20 PROCEDURE — 94760 N-INVAS EAR/PLS OXIMETRY 1: CPT

## 2024-06-20 PROCEDURE — 80048 BASIC METABOLIC PNL TOTAL CA: CPT

## 2024-06-20 PROCEDURE — 94003 VENT MGMT INPAT SUBQ DAY: CPT

## 2024-06-20 PROCEDURE — 97530 THERAPEUTIC ACTIVITIES: CPT

## 2024-06-20 RX ORDER — DULOXETIN HYDROCHLORIDE 30 MG/1
30 CAPSULE, DELAYED RELEASE ORAL DAILY
Status: DISCONTINUED | OUTPATIENT
Start: 2024-06-20 | End: 2024-06-22 | Stop reason: HOSPADM

## 2024-06-20 RX ORDER — LANOLIN ALCOHOL/MO/W.PET/CERES
3 CREAM (GRAM) TOPICAL
Status: DISCONTINUED | OUTPATIENT
Start: 2024-06-20 | End: 2024-06-22 | Stop reason: HOSPADM

## 2024-06-20 RX ORDER — POTASSIUM CHLORIDE 14.9 MG/ML
20 INJECTION INTRAVENOUS ONCE
Status: COMPLETED | OUTPATIENT
Start: 2024-06-20 | End: 2024-06-20

## 2024-06-20 RX ADMIN — MICONAZOLE NITRATE: 20 CREAM TOPICAL at 18:07

## 2024-06-20 RX ADMIN — ACETAMINOPHEN 1000 MG: 10 INJECTION INTRAVENOUS at 06:03

## 2024-06-20 RX ADMIN — SODIUM CHLORIDE SOLN NEBU 3% 4 ML: 3 NEBU SOLN at 07:45

## 2024-06-20 RX ADMIN — FLUOXETINE HYDROCHLORIDE 10 MG: 10 CAPSULE ORAL at 08:41

## 2024-06-20 RX ADMIN — LEVALBUTEROL HYDROCHLORIDE 1.25 MG: 1.25 SOLUTION RESPIRATORY (INHALATION) at 13:45

## 2024-06-20 RX ADMIN — MICONAZOLE NITRATE: 20 CREAM TOPICAL at 08:41

## 2024-06-20 RX ADMIN — APIXABAN 5 MG: 5 TABLET, FILM COATED ORAL at 08:41

## 2024-06-20 RX ADMIN — ACETAMINOPHEN 1000 MG: 10 INJECTION INTRAVENOUS at 23:42

## 2024-06-20 RX ADMIN — DULOXETINE HYDROCHLORIDE 30 MG: 30 CAPSULE, DELAYED RELEASE ORAL at 18:07

## 2024-06-20 RX ADMIN — CHLORHEXIDINE GLUCONATE 0.12% ORAL RINSE 15 ML: 1.2 LIQUID ORAL at 20:13

## 2024-06-20 RX ADMIN — SODIUM CHLORIDE SOLN NEBU 3% 4 ML: 3 NEBU SOLN at 19:28

## 2024-06-20 RX ADMIN — GUAIFENESIN 200 MG: 100 SOLUTION ORAL at 15:49

## 2024-06-20 RX ADMIN — LEVALBUTEROL HYDROCHLORIDE 1.25 MG: 1.25 SOLUTION RESPIRATORY (INHALATION) at 07:45

## 2024-06-20 RX ADMIN — SODIUM CHLORIDE SOLN NEBU 3% 4 ML: 3 NEBU SOLN at 13:45

## 2024-06-20 RX ADMIN — SODIUM CHLORIDE SOLN NEBU 3% 4 ML: 3 NEBU SOLN at 02:15

## 2024-06-20 RX ADMIN — Medication 3 MG: at 23:42

## 2024-06-20 RX ADMIN — GUAIFENESIN 200 MG: 100 SOLUTION ORAL at 08:41

## 2024-06-20 RX ADMIN — LEVALBUTEROL HYDROCHLORIDE 1.25 MG: 1.25 SOLUTION RESPIRATORY (INHALATION) at 19:28

## 2024-06-20 RX ADMIN — APIXABAN 5 MG: 5 TABLET, FILM COATED ORAL at 18:07

## 2024-06-20 RX ADMIN — POTASSIUM CHLORIDE 20 MEQ: 14.9 INJECTION, SOLUTION INTRAVENOUS at 07:41

## 2024-06-20 RX ADMIN — GUAIFENESIN 200 MG: 100 SOLUTION ORAL at 20:18

## 2024-06-20 RX ADMIN — LEVALBUTEROL HYDROCHLORIDE 1.25 MG: 1.25 SOLUTION RESPIRATORY (INHALATION) at 02:15

## 2024-06-20 RX ADMIN — CHLORHEXIDINE GLUCONATE 0.12% ORAL RINSE 15 ML: 1.2 LIQUID ORAL at 08:41

## 2024-06-20 NOTE — CASE MANAGEMENT
Case Management Discharge Planning Note    Patient name Hemanth Swanson  [facility] [id number]  : 1985 Date 2024       Current Admission Date: 6/10/2024  Current Admission Diagnosis:Status post tracheostomy (HCC)   Patient Active Problem List    Diagnosis Date Noted Date Diagnosed    Ventilator associated pneumonia (HCC) 2024     Cardiac arrest due to other underlying condition ([facility]) 2024     Seizure-like activity ([facility]) 2024     Bradycardia 2024     Ventilator dependent ([facility]) 2024     Neuromuscular disorder ([facility]) 2024     Obesity hypoventilation syndrome ([facility]) 10/24/2023     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     Psychophysiological insomnia 10/18/2023     Impaired physical mobility 2023     Status post tracheostomy ([facility]) 2023     S/P percutaneous endoscopic gastrostomy (PEG) tube placement ([facility]) 2023     Anxiety 2023     Chronic respiratory failure with hypoxia and hypercapnia ([facility]) 2023     Sepsis ([facility]) 2023     Acute pulmonary embolism without acute cor pulmonale ([facility]) 2023     Depression 2023     Neuromuscular weakness ([facility]) 2023     History of LVH (left ventricular hypertrophy) 2023     Pure hypertriglyceridemia 2023     Unilateral vestibular schwannoma ([facility]) 2023     Port-A-Cath in place 2023     Diplopia 2023     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HCC) 2023     Umbilical hernia without obstruction and without gangrene 12/10/2022     Tobacco use 12/10/2022     Retroperitoneal lymphadenopathy 12/10/2022       LOS (days): 10  Geometric Mean LOS (GMLOS) (days):   Days to GMLOS:     OBJECTIVE:  Risk of Unplanned Readmission Score: 33.08         Current admission status: Inpatient   Preferred Pharmacy:   [id number]  [place] - [address] [address]  Phone: [phone] Fax:  [phone]    Primary Care Provider: Ela Douglas MD    Primary Insurance: [facility]  Secondary Insurance:     DISCHARGE DETAILS:                   Contacts  Patient Contacts: Dmitry Swanson (sister)  Relationship to Patient:: Family  Contact Method: Phone  Phone Number: [phone]  Reason/Outcome: Discharge Planning, Continuity of Care          Other Referral/Resources/Interventions Provided:  Referral Comments: Spoke to pt's sister Dmitry - confirmed tube feed pump and nicky lift delivered to pt's home.  Trach supplies still pending delivery.

## 2024-06-20 NOTE — UTILIZATION REVIEW
Continued Stay Review    Date: 06/20                          Current Patient Class: IP  Current Level of Care: CC    HPI:39 y.o. male initially admitted on  06/10    Assessment/Plan: Pts trach was changed yesterday to XLT d/t concerns for air leak. Vent dependent. Cleared by ST for pureed diet but later had an episode of vomiting. Rpt CXR obtained and NG tube placed for decompression. Currently npo, start TF and remove NGT, if stable can trial PO in MA. No further episodes of bradycardia today. continue cymbalta 30 for 1 week and increase to 60 daily, followup neuro and check testosterone level. Cont Eliquis. Cont nebs.     RT Note: on exam, Coarse, Diminished b/l breath sounds.  pt is vent dependant no sbt performed will contiue to monitor       Vital Signs (last 3 days)       Date/Time Temp Pulse Resp BP MAP (mmHg) SpO2 FiO2 (%) O2 Device Patient Position - Orthostatic VS Burgess Coma Scale Score Pain    06/20/24 1600 -- 70 16 140/86 101 96 % -- -- -- 11 --    06/20/24 1500 -- 80 15 130/78 99 94 % -- -- -- -- --    06/20/24 1400 -- 70 -- 134/90 108 99 % -- -- -- -- --    06/20/24 1317 -- -- -- -- -- -- -- -- -- -- No Pain    06/20/24 1300 -- 61 21 152/86 113 100 % -- -- -- -- --    06/20/24 1200 98.1 °F (36.7 °C) 58 15 121/60 87 100 % -- -- -- 11 --    06/20/24 1100 -- 64 20 134/61 88 99 % -- -- -- -- --    06/20/24 1000 -- 67 29 -- -- 98 % -- -- -- -- --    06/20/24 0900 -- 65 25 140/67 96 95 % -- -- -- -- --    06/20/24 0800 98 °F (36.7 °C) 68 17 125/67 90 99 % -- Ventilator Lying 11 --    06/20/24 0600 -- 65 18 103/61 78 94 % -- -- -- -- --    06/20/24 0500 -- 72 19 136/73 101 100 % -- -- -- -- --    06/20/24 0403 -- -- -- -- -- -- -- -- -- 11 --    06/20/24 0400 98.8 °F (37.1 °C) 75 20 138/63 90 99 % 40 Ventilator Lying -- --    06/20/24 0300 -- 68 28 137/70 98 94 % -- -- -- -- --    06/20/24 0216 -- -- -- -- -- 100 % -- -- -- -- --    06/20/24 0200 -- 75 16 94/61 72 98 % -- -- -- -- --    06/20/24 0100 -- 77  16 112/61 81 100 % -- -- -- -- --    06/20/24 0001 -- -- -- -- -- 98 % -- -- -- -- --    06/20/24 0000 100.6 °F (38.1 °C) 82 17 123/60 87 100 % 40 Ventilator Lying -- --    06/19/24 2342 -- -- -- -- -- -- -- -- -- 11 --    06/19/24 2300 -- 81 18 121/63 87 100 % -- -- -- -- --    06/19/24 2200 -- 74 21 -- -- 100 % -- -- -- -- --    06/19/24 2100 -- 77 20 108/59 78 100 % -- -- -- -- --    06/19/24 2000 99.7 °F (37.6 °C) 79 20 122/77 95 100 % 50 Ventilator Lying -- --    06/19/24 1946 -- -- -- -- -- -- -- -- -- 11 --    06/19/24 1930 -- -- -- -- -- 99 % -- -- -- -- --    06/19/24 1900 -- 91 21 106/54 77 100 % -- -- -- -- --    06/19/24 1800 -- 84 -- 133/71 93 95 % -- -- -- -- --    06/19/24 1700 100.4 °F (38 °C) 77 -- 137/79 102 100 % -- -- -- -- --    06/19/24 1600 -- 79 -- 130/62 89 98 % -- -- -- 11 --    06/19/24 1500 -- 69 -- 137/71 96 94 % -- -- -- -- --    06/19/24 1400 -- 86 -- 112/53 77 95 % -- -- -- -- --    06/19/24 1300 -- 75 -- 153/76 108 95 % -- -- -- -- --    06/19/24 1200 99.1 °F (37.3 °C) 68 -- 157/79 110 100 % -- -- -- 11 --    06/19/24 1100 -- 75 -- 114/59 80 96 % -- -- -- -- --    06/19/24 1000 -- 83 -- 142/100 115 99 % -- -- -- -- --    06/19/24 0900 -- 72 24 158/90 117 97 % -- -- -- -- --    06/19/24 0800 98.8 °F (37.1 °C) 68 14 112/53 77 96 % -- Ventilator Lying 11 --    06/19/24 0700 -- 62 13 143/75 101 97 % -- -- -- -- --    06/19/24 0600 -- 85 17 101/65 78 97 % -- -- -- -- --    06/19/24 0500 -- 63 16 117/70 89 98 % -- -- -- -- --    06/19/24 0400 97.6 °F (36.4 °C) 72 14 111/60 80 95 % -- -- -- 11 --    06/19/24 0320 -- -- -- -- -- 99 % -- -- -- -- --    06/19/24 0300 -- 60 13 107/62 80 99 % -- -- -- -- --    06/19/24 0250 -- -- -- -- -- 99 % -- -- -- -- --    06/19/24 0200 -- 63 16 106/62 80 97 % -- -- -- -- --    06/19/24 0100 -- 62 12 103/57 76 98 % -- -- -- -- --    06/19/24 0000 97.9 °F (36.6 °C) 62 12 104/66 81 99 % -- -- -- 11 --    06/18/24 2300 -- 58 12 107/63 80 98 % -- -- -- -- --     06/18/24 2217 -- -- -- -- -- 96 % -- -- -- -- --    06/18/24 2200 -- 65 15 107/62 80 96 % -- -- -- -- --    06/18/24 2100 -- 70 14 105/65 80 95 % -- -- -- -- --    06/18/24 2010 97.5 °F (36.4 °C) 69 16 104/59 76 91 % -- -- -- -- --    06/18/24 2000 -- -- -- -- -- -- -- -- -- 11 --    06/18/24 1943 -- -- -- -- -- 94 % -- -- -- -- --    06/18/24 1900 -- 57 12 -- -- 92 % -- -- -- -- --    06/18/24 1820 -- 52 12 101/60 76 93 % -- Ventilator Lying -- --    06/18/24 1730 -- 55 13 93/59 71 97 % -- -- -- -- --    06/18/24 1719 -- -- -- -- -- -- -- -- -- 11 --    06/18/24 1715 -- 55 13 95/56 71 97 % -- -- -- -- --    06/18/24 1605 -- -- -- -- -- 98 % -- -- -- -- --    06/18/24 1542 -- 73 23 134/93 108 100 % -- Ventilator -- -- --    06/18/24 1530 -- 58 23 126/75 95 100 % -- Ventilator -- -- --    06/18/24 1500 -- 57 32 122/70 90 95 % -- -- -- -- --    06/18/24 1400 -- 56 15 104/63 80 92 % -- Ventilator Lying -- --    06/18/24 1337 -- -- -- -- -- 92 % -- -- -- -- --    06/18/24 1300 96.5 °F (35.8 °C) 49 13 104/65 80 92 % -- -- -- 11 --    06/18/24 1239 -- 48 12 105/62 79 96 % -- -- -- -- --    06/18/24 1200 -- 51 24 120/75 93 100 % -- -- -- -- --    06/18/24 1122 -- -- -- -- -- 100 % -- -- -- -- --    06/18/24 1100 -- 49 12 108/58 76 98 % -- -- -- -- --    06/18/24 1000 -- 60 12 101/56 77 100 % -- -- -- -- --    06/18/24 0900 -- 62 15 96/70 80 96 % -- -- -- 11 --    06/18/24 0805 -- -- -- -- -- 96 % -- -- -- -- --    06/18/24 0800 97.6 °F (36.4 °C) 52 14 99/63 77 97 % -- Ventilator Lying -- --    06/18/24 0704 -- -- -- -- -- 100 % -- -- -- -- --    06/18/24 0700 -- 54 13 93/59 72 99 % -- -- -- -- --    06/18/24 0600 -- 53 12 94/57 71 97 % -- -- -- -- --    06/18/24 0500 -- 59 13 102/57 73 97 % -- -- -- -- --    06/18/24 0410 98.5 °F (36.9 °C) 52 12 116/74 90 100 % -- -- -- -- --    06/18/24 0400 -- 55 12 -- -- 99 % -- -- -- -- --    06/18/24 0343 -- -- -- -- -- -- -- -- -- 11 --    06/18/24 0300 -- 58 12 108/74 86 99 % -- --  -- -- --    06/18/24 0200 -- 53 12 104/68 82 99 % -- -- -- -- --    06/18/24 0100 -- 58 14 101/61 74 99 % -- -- -- -- --    06/18/24 0008 -- -- -- -- -- -- -- -- -- 11 --    06/18/24 0000 98.4 °F (36.9 °C) 60 12 110/64 82 99 % -- -- -- -- --    06/17/24 2300 -- 63 12 105/57 75 99 % -- -- -- -- --    06/17/24 2200 -- 77 13 107/52 75 99 % -- -- -- -- --    06/17/24 2100 -- 74 13 153/82 111 100 % -- -- -- -- --    06/17/24 2000 98.1 °F (36.7 °C) 78 16 135/74 99 99 % -- -- -- 11 --    06/17/24 1900 -- 63 10 161/89 120 100 % -- -- -- -- --    06/17/24 1800 -- 56 14 163/94 123 99 % -- -- -- -- --    06/17/24 1700 -- 57 15 171/106 130 100 % -- -- -- -- --    06/17/24 1600 98.3 °F (36.8 °C) 78 17 158/83 109 100 % -- Ventilator -- 11 --    06/17/24 1500 -- 80 12 122/67 85 100 % -- -- -- -- --    06/17/24 1400 -- 79 17 146/77 106 97 % -- -- -- -- --    06/17/24 1300 -- 88 18 137/91 108 100 % -- -- -- -- --    06/17/24 1255 -- -- -- -- -- 100 % -- -- -- -- --    06/17/24 1200 97.9 °F (36.6 °C) 74 20 136/82 103 99 % -- -- -- 11 --    06/17/24 1100 -- 66 19 126/93 107 99 % -- -- -- -- --    06/17/24 1000 -- 71 27 -- -- 100 % -- -- -- -- --    06/17/24 0900 -- 63 30 131/89 105 97 % -- -- -- -- --    06/17/24 0800 97.7 °F (36.5 °C) 81 12 108/58 76 99 % -- -- -- -- --    06/17/24 0750 -- -- -- -- -- -- -- -- -- 11 No Pain    06/17/24 0741 -- -- -- -- -- 97 % -- -- -- -- --    06/17/24 0730 -- 65 16 129/82 101 92 % -- -- -- -- --    06/17/24 0700 -- 70 15 -- -- 98 % -- -- -- -- --    06/17/24 0600 -- 78 12 132/76 98 100 % -- -- -- -- --    06/17/24 0500 -- 75 13 141/88 108 100 % -- -- -- -- --    06/17/24 0400 98.2 °F (36.8 °C) 76 13 122/74 94 99 % -- -- -- -- --    06/17/24 0347 -- -- -- -- -- -- -- -- -- 11 No Pain    06/17/24 0300 -- 76 13 123/71 92 99 % -- -- -- -- --    06/17/24 0200 -- 69 13 105/59 77 96 % -- -- -- -- --    06/17/24 0100 -- 77 14 102/59 73 97 % -- -- -- -- --    06/17/24 0000 98.1 °F (36.7 °C) 81 13 127/87 102  100 % -- -- -- 11 No Pain          Weight (last 2 days)       Date/Time Weight    06/20/24 0600 113 (250.22)    06/19/24 0300 119 (261.47)    06/18/24 0500 117 (258.16)              Pertinent Labs/Diagnostic Results:   Radiology:  XR chest portable   Final Interpretation by Joan Mcintyre MD (06/20 0829)      Persistent moderate bibasilar atelectasis. Superimposed aspiration/pneumonia not excluded.            Workstation performed: [id number]         XR chest portable ICU   Final Interpretation by Jose Briones MD (06/19 1530)   Tracheostomy tube appears in place.   Possible small effusions.            Workstation performed: [id number]         IR gastrostomy (G) tube check/change/reinsertion/upsize   Final Interpretation by Natanael Foster MD (06/18 1541)      16 Frisian gastrostomy tube replacement through existing tract.      Plan:      Return in 6 months for routine tube exchange.               Workstation performed: [id number]         XR chest portable   Final Interpretation by Lionel Willard MD (06/18 1441)      Tracheostomy tip projects over the lower cervical trachea above the level of the clavicles. Consider repositioning.      Otherwise, stable appearance with low lung volumes and bibasilar multisegmental atelectasis.            Workstation performed: [id number]         MRI brain w wo contrast   Final Interpretation by E. Alec Schoenberger, MD (06/16 0657)      No acute intracranial pathology.   Stable mild nonspecific white matter changes likely due to chronic microangiopathy.      Workstation performed: [id number]         XR chest portable ICU   Final Interpretation by Jewel Burton MD (06/12 0851)      Bibasilar atelectasis. Suboptimal inspiration.            Workstation performed: [id number]         XR chest portable ICU   Final Interpretation by Lionel Willard MD (06/11 1100)      Hypoventilation with associated basilar atelectasis.            Workstation performed:  [id number]           Cardiology:  ECG 12 lead   Final Result by Geo Solorzano MD (06/13 1702)   Sinus rhythm with   Baseline artifact   Otherwise normal ECG   Confirmed by Geo Solorzano ([id number]) on 6/13/2024 5:02:24 PM      Echo complete w/ contrast if indicated   Final Result by Wisam Lyman MD (06/11 1708)        Left Ventricle: Left ventricular cavity size is normal. Wall thickness    is normal. The left ventricular ejection fraction is 65%. Systolic    function is normal. Wall motion is normal. Diastolic function is normal.     Right Ventricle: Right ventricular cavity size is dilated. Systolic    function is normal. Wall thickness is mildly increased.         ECG 12 lead   Final Result by Geo Solorzano MD (06/11 1958)   Marked sinus bradycardia   Rightward axis   Abnormal ECG   When compared with ECG of 10-LORETA-2024 17:57,   Vent. rate has decreased   Confirmed by Geo Solorzano ([id number]) on 6/11/2024 7:58:08 PM      ECG 12 lead   Final Result by Ankush Samano MD (06/10 1811)   Normal sinus rhythm   Normal ECG   When compared with ECG of 31-MAY-2024 11:57,   Vent. rate has decreased BY  54 BPM   ST elevation now present in Inferior leads   ST elevation has replaced ST depression in Anterior leads   T wave inversion no longer evident in Inferior leads   Nonspecific T wave abnormality no longer evident in Anterolateral leads   Normal sinus rhythm   Nonspecific ST-t wave changes   Confirmed by Ankush Samano ([id number]) on 6/10/2024 6:11:26 PM        GI:  Bronchoscopy   Final Result by Romie Griems MD (06/16 1648)   The middle trachea, lower trachea, main prashant, left main stem, JOSÉ MANUEL,    lingula, LLL, right main stem, RUL, bronchus intermedius, RML and RLL    appeared normal.         RECOMMENDATION:   Continue ICU level of care      I was present from the throughout entire procedure, and guided    bronchoscopy.  I agree with above findings, impression and recommendations    per fellow.        Romie  MD Sydney   Attending Physician   Pulmonary and Critical Care Medicine         Bronchoscopy   Final Result by Romie Grimes MD (06/13 1100)   The middle trachea, lower trachea, main prashant, left main stem, JOSÉ MANUEL,    lingula, LLL, right main stem, RUL, bronchus intermedius and RML appeared    normal.         RECOMMENDATION:   Continue ICU care      I was present from the throughout entire procedure, and guided    bronchoscopy.  I agree with above findings, impression and recommendations    per fellow.        Romie Grimes MD   Attending Physician   Pulmonary and Critical Care Medicine                    Results from last 7 days   Lab Units 06/20/24 0441 06/19/24  0941 06/17/24 0622 06/16/24 1756 06/14/24  0525 06/14/24  0525   WBC Thousand/uL 6.29 6.02 7.53 7.55  --  8.83   HEMOGLOBIN g/dL 11.8* 12.0 11.8* 10.9*  --  12.1   HEMATOCRIT % 37.9 37.1 37.3 33.8*  --  38.4   PLATELETS Thousands/uL 255 244 244 219  --  236   TOTAL NEUT ABS Thousands/µL 4.16 4.48 5.53 4.92   < >  --     < > = values in this interval not displayed.         Results from last 7 days   Lab Units 06/20/24 0441 06/19/24 0941 06/17/24 0622 06/16/24 1756 06/14/24  0525   SODIUM mmol/L 143 138 138 136 137   POTASSIUM mmol/L 3.8 4.1 4.2 3.5 4.3   CHLORIDE mmol/L 99 98 93* 94* 93*   CO2 mmol/L 36* 36* 37* 39* 35*   ANION GAP mmol/L 8 4 8 3* 9   BUN mg/dL 11 12 11 12 13   CREATININE mg/dL 0.53* 0.46* 0.36* 0.39* 0.39*   EGFR ml/min/1.73sq m 135 143 158 153 153   CALCIUM mg/dL 9.3 9.0 9.3 9.0 9.3   MAGNESIUM mg/dL  --   --  2.1 2.0  --    PHOSPHORUS mg/dL  --   --  3.5 3.2  --          Results from last 7 days   Lab Units 06/15/24  0656   POC GLUCOSE mg/dl 88     Results from last 7 days   Lab Units 06/20/24 0441 06/19/24  0941 06/17/24  0622 06/16/24  1756 06/14/24  0525   GLUCOSE RANDOM mg/dL 91 96 88 103 96       Results from last 7 days   Lab Units 06/16/24  1756   PROTIME seconds 15.1*   INR  1.20*       Medications:   Scheduled  Medications:  apixaban, 5 mg, Oral, BID  chlorhexidine, 15 mL, Mouth/Throat, Q12H ISAEL  DULoxetine, 30 mg, Oral, Daily  fentaNYL, 100 mcg, Intravenous, Once  guaiFENesin, 200 mg, Oral, Q6H  levalbuterol, 1.25 mg, Nebulization, Q6H  JANES ANTIFUNGAL, , Topical, BID  sodium chloride, 4 mL, Nebulization, Q6H      Continuous IV Infusions:     PRN Meds:  acetaminophen, 1,000 mg, Intravenous, Q6H PRN  albuterol, 2.5 mg, Nebulization, Q4H PRN  atropine, 0.5 mg, Intravenous, Once PRN  glycopyrrolate, 0.1 mg, Intravenous, Q4H PRN  ondansetron, 4 mg, Intravenous, Q6H PRN        Discharge Plan: [facility]    Network Utilization Review Department  ATTENTION: Please call with any questions or concerns to [phone] and carefully listen to the prompts so that you are directed to the right person. All voicemails are confidential.   For Discharge needs, contact Care Management DC Support Team at [phone] opt. [phone]  Send all requests for admission clinical reviews, approved or denied determinations and any other requests to dedicated fax number below belonging to the campus where the patient is receiving treatment. List of dedicated fax numbers for the Facilities:  FACILITY NAME UR FAX NUMBER   ADMISSION DENIALS (Administrative/Medical Necessity) [phone]   DISCHARGE SUPPORT TEAM (NETWORK) [phone]   PARENT CHILD HEALTH (Maternity/NICU/Pediatrics) [phone]   [facility] [phone]   [facility] [phone]   [facility] [phone]   [facility] [phone]   [facility] [phone]   [facility] [phone]   [facility] [phone]   [facility] [phone]   [facility] [phone]   [facility] [phone]   [facility]  [phone]   [facility] [phone]

## 2024-06-20 NOTE — PLAN OF CARE
Problem: Prexisting or High Potential for Compromised Skin Integrity  Goal: Skin integrity is maintained or improved  Description: INTERVENTIONS:  - Identify patients at risk for skin breakdown  - Assess and monitor skin integrity  - Assess and monitor nutrition and hydration status  - Monitor labs   - Assess for incontinence   - Turn and reposition patient  - Assist with mobility/ambulation  - Relieve pressure over bony prominences  - Avoid friction and shearing  - Provide appropriate hygiene as needed including keeping skin clean and dry  - Evaluate need for skin moisturizer/barrier cream  - Collaborate with interdisciplinary team   - Patient/family teaching  - Consider wound care consult   Outcome: Progressing     Problem: PAIN - ADULT  Goal: Verbalizes/displays adequate comfort level or baseline comfort level  Description: Interventions:  - Encourage patient to monitor pain and request assistance  - Assess pain using appropriate pain scale  - Administer analgesics based on type and severity of pain and evaluate response  - Implement non-pharmacological measures as appropriate and evaluate response  - Consider cultural and social influences on pain and pain management  - Notify physician/advanced practitioner if interventions unsuccessful or patient reports new pain  Outcome: Progressing     Problem: PAIN - ADULT  Goal: Verbalizes/displays adequate comfort level or baseline comfort level  Description: Interventions:  - Encourage patient to monitor pain and request assistance  - Assess pain using appropriate pain scale  - Administer analgesics based on type and severity of pain and evaluate response  - Implement non-pharmacological measures as appropriate and evaluate response  - Consider cultural and social influences on pain and pain management  - Notify physician/advanced practitioner if interventions unsuccessful or patient reports new pain  Outcome: Progressing     Problem: INFECTION - ADULT  Goal: Absence  or prevention of progression during hospitalization  Description: INTERVENTIONS:  - Assess and monitor for signs and symptoms of infection  - Monitor lab/diagnostic results  - Monitor all insertion sites, i.e. indwelling lines, tubes, and drains  - Monitor endotracheal if appropriate and nasal secretions for changes in amount and color  - [place] appropriate cooling/warming therapies per order  - Administer medications as ordered  - Instruct and encourage patient and family to use good hand hygiene technique  - Identify and instruct in appropriate isolation precautions for identified infection/condition  Outcome: Progressing  Goal: Absence of fever/infection during neutropenic period  Description: INTERVENTIONS:  - Monitor WBC    Outcome: Progressing     Problem: SAFETY ADULT  Goal: Patient will remain free of falls  Description: INTERVENTIONS:  - Educate patient/family on patient safety including physical limitations  - Instruct patient to call for assistance with activity   - Consult OT/PT to assist with strengthening/mobility   - Keep Call bell within reach  - Keep bed low and locked with side rails adjusted as appropriate  - Keep care items and personal belongings within reach  - Initiate and maintain comfort rounds  - Make Fall Risk Sign visible to staff  - Offer Toileting every  Hours, in advance of need  - Initiate/Maintain alarm  - Obtain necessary fall risk management equipment:   - Apply yellow socks and bracelet for high fall risk patients  - Consider moving patient to room near nurses station  Outcome: Progressing  Goal: Maintain or return to baseline ADL function  Description: INTERVENTIONS:  -  Assess patient's ability to carry out ADLs; assess patient's baseline for ADL function and identify physical deficits which impact ability to perform ADLs (bathing, care of mouth/teeth, toileting, grooming, dressing, etc.)  - Assess/evaluate cause of self-care deficits   - Assess range of motion  - Assess  patient's mobility; develop plan if impaired  - Assess patient's need for assistive devices and provide as appropriate  - Encourage maximum independence but intervene and supervise when necessary  - Involve family in performance of ADLs  - Assess for home care needs following discharge   - Consider OT consult to assist with ADL evaluation and planning for discharge  - Provide patient education as appropriate  Outcome: Progressing  Goal: Maintains/Returns to pre admission functional level  Description: INTERVENTIONS:  - Perform AM-PAC 6 Click Basic Mobility/ Daily Activity assessment daily.  - Set and communicate daily mobility goal to care team and patient/family/caregiver.   - Collaborate with rehabilitation services on mobility goals if consulted  - Perform Range of Motion  times a day.  - Reposition patient every  hours.  - Dangle patient  times a day  - Stand patient  times a day  - Ambulate patient  times a day  - Out of bed to chair  times a day   - Out of bed for meals times a day  - Out of bed for toileting  - Record patient progress and toleration of activity level   Outcome: Progressing     Problem: Knowledge Deficit  Goal: Patient/family/caregiver demonstrates understanding of disease process, treatment plan, medications, and discharge instructions  Description: Complete learning assessment and assess knowledge base.  Interventions:  - Provide teaching at level of understanding  - Provide teaching via preferred learning methods  Outcome: Progressing     Problem: Nutrition/Hydration-ADULT  Goal: Nutrient/Hydration intake appropriate for improving, restoring or maintaining nutritional needs  Description: Monitor and assess patient's nutrition/hydration status for malnutrition. Collaborate with interdisciplinary team and initiate plan and interventions as ordered.  Monitor patient's weight and dietary intake as ordered or per policy. Utilize nutrition screening tool and intervene as necessary. Determine  patient's food preferences and provide high-protein, high-caloric foods as appropriate.     INTERVENTIONS:  - Monitor oral intake, urinary output, labs, and treatment plans  - Assess nutrition and hydration status and recommend course of action  - Evaluate amount of meals eaten  - Assist patient with eating if necessary   - Allow adequate time for meals  - Recommend/ encourage appropriate diets, oral nutritional supplements, and vitamin/mineral supplements  - Order, calculate, and assess calorie counts as needed  - Recommend, monitor, and adjust tube feedings and TPN/PPN based on assessed needs  - Assess need for intravenous fluids  - Provide specific nutrition/hydration education as appropriate  - Include patient/family/caregiver in decisions related to nutrition  Outcome: Progressing     Problem: SAFETY,RESTRAINT: NV/NON-SELF DESTRUCTIVE BEHAVIOR  Goal: Remains free of harm/injury (restraint for non violent/non self-detsructive behavior)  Description: INTERVENTIONS:  - Instruct patient/family regarding restraint use   - Assess and monitor physiologic and psychological status   - Provide interventions and comfort measures to meet assessed patient needs   - Identify and implement measures to help patient regain control  - Assess readiness for release of restraint   Outcome: Progressing  Goal: Returns to optimal restraint-free functioning  Description: INTERVENTIONS:  - Assess the patient's behavior and symptoms that indicate continued need for restraint  - Identify and implement measures to help patient regain control  - Assess readiness for release of restraint   Outcome: Progressing     Problem: SAFETY ADULT  Goal: Patient will remain free of falls  Description: INTERVENTIONS:  - Educate patient/family on patient safety including physical limitations  - Instruct patient to call for assistance with activity   - Consult OT/PT to assist with strengthening/mobility   - Keep Call bell within reach  - Keep bed low and  locked with side rails adjusted as appropriate  - Keep care items and personal belongings within reach  - Initiate and maintain comfort rounds  - Make Fall Risk Sign visible to staff  - Offer Toileting every  Hours, in advance of need  - Initiate/Maintain alarm  - Obtain necessary fall risk management equipment:   - Apply yellow socks and bracelet for high fall risk patients  - Consider moving patient to room near nurses station  Outcome: Progressing  Goal: Maintain or return to baseline ADL function  Description: INTERVENTIONS:  -  Assess patient's ability to carry out ADLs; assess patient's baseline for ADL function and identify physical deficits which impact ability to perform ADLs (bathing, care of mouth/teeth, toileting, grooming, dressing, etc.)  - Assess/evaluate cause of self-care deficits   - Assess range of motion  - Assess patient's mobility; develop plan if impaired  - Assess patient's need for assistive devices and provide as appropriate  - Encourage maximum independence but intervene and supervise when necessary  - Involve family in performance of ADLs  - Assess for home care needs following discharge   - Consider OT consult to assist with ADL evaluation and planning for discharge  - Provide patient education as appropriate  Outcome: Progressing  Goal: Maintains/Returns to pre admission functional level  Description: INTERVENTIONS:  - Perform AM-PAC 6 Click Basic Mobility/ Daily Activity assessment daily.  - Set and communicate daily mobility goal to care team and patient/family/caregiver.   - Collaborate with rehabilitation services on mobility goals if consulted  - Perform Range of Motion times a day.  - Reposition patient every  hours.  - Dangle patient  times a day  - Stand patient  times a day  - Ambulate patient  times a day  - Out of bed to chair  times a day   - Out of bed for meals times a day  - Out of bed for toileting  - Record patient progress and toleration of activity level   Outcome:  Progressing

## 2024-06-20 NOTE — PLAN OF CARE
Problem: PHYSICAL THERAPY ADULT  Goal: Performs mobility at highest level of function for planned discharge setting.  See evaluation for individualized goals.  Description: Treatment/Interventions: ADL retraining, Functional transfer training, LE strengthening/ROM, Therapeutic exercise, Endurance training, Cognitive reorientation, Patient/family training, Equipment eval/education, Bed mobility, Gait training, Spoke to nursing, Spoke to case management, OT  Equipment Recommended:  (TBD)       See flowsheet documentation for full assessment, interventions and recommendations.  Outcome: Progressing  Note: Prognosis: Good  Problem List: Decreased strength, Decreased range of motion, Decreased endurance, Impaired balance, Decreased mobility, Decreased coordination, Decreased cognition, Decreased safety awareness  Assessment: Pt continuing to engage in PT session, once pt was able to sit EOB for 15 minutes with Min(A) and verbal cuing to sit upright. Pt still requires Max(A)x2 for bed mobility at this time and ias unable to progress further in mobility at this time d/t significant weakness. Pt still limited by fatigue at this time. Pt will continue to benefit from PT services at this time until medically cleared for D/C; D/C recommendation are listed below; will continue to follow.  Barriers to Discharge: Inaccessible home environment     Rehab Resource Intensity Level, PT: I (Maximum Resource Intensity)    See flowsheet documentation for full assessment.

## 2024-06-20 NOTE — RESPIRATORY THERAPY NOTE
Resp Vent Note   06/20/24 0220   Respiratory Assessment   Assessment Type Assess only   General Appearance Sleeping   Respiratory Pattern Assisted   Chest Assessment Chest expansion symmetrical   Bilateral Breath Sounds Coarse;Diminished   Cough Productive   Suction Trach   Resp Comments Pt conts to be on SCMV vent settings, tolerating well, no changes made at this time, will cont to rosalba pt per resp prot.   O2 Device C3 vent   Vent Information   Vent type Hook C3   Hook Vent Mode (S)CMV   $ Vent Daily Charge-Subsequent Yes   $ Pulse Oximetry Spot Check Charge Completed   (S)CMV Settings   Resp Rate (BPM) 12 BPM   VT (mL) 500 mL   FIO2 (%) 40 %   PEEP (cmH2O) 8 cmH2O   I:E Ratio 1:4.6   Insp Time (%) 0.9 %   Flow Trigger (LPM) 2.5   Humidification Heater   Heater Temperature (Set) 87.8 °F (31 °C)   (S)CMV Actuals   Resp Rate (BPM) 17 BPM   VT (mL) 575   MV 8.2   MAP (cmH2O) 11 cmH2O   Peak Pressure (cmH2O) 23 cmH2O   I:E Ratio (Obs) 1:4.6   Insp Resistance 12   Heater Temperature (Obs) 87.3 °F (30.7 °C)   Static Compliance (mL/cmH20) 42.8 mL/cmH2O   (S)CMV Alarms   High Peak Pressure (cmH2O) 55   Low Pressure (cmH2O) 5 cm H2O   High Resp Rate (BPM) 40 BPM   Low Resp Rate (BPM) 8 BPM   High MV (L/min) 20 L/min   Low MV (L/min) 3 L/min   High VT (mL) 1000 mL   Low VT (mL) 200 mL   Apnea Time (s) 20 S   Maintenance   Alarm (pink) cable attached No   Resuscitation bag with peep valve at bedside Yes   Water bag changed No   Circuit changed No   IHI Ventilator Associated Pneumonia Bundle   Head of Bed Elevated HOB 30   Surgical Airway Distal;Cuffed;Other (Comment)   Placement Date/Time: 06/01/24 1155   Tube Size: 6  Placed By: Physician  Placed by (Name): Dr. Ronquillo  Type: Tracheostomy  Style: (c) Distal;Cuffed;Other (Comment)   Status Cuff Inflated;Secured   Surgical Airway Cuff Pressure (color) Green   Equipment at bedside BVM;Wall Suction setup;Additional complete same size trach tube;Additional complete  one size smaller trach tube;Obturator;Sterile saline;Additional inner cannula

## 2024-06-20 NOTE — PLAN OF CARE
Problem: OCCUPATIONAL THERAPY ADULT  Goal: Performs self-care activities at highest level of function for planned discharge setting.  See evaluation for individualized goals.  Description: Treatment Interventions: ADL retraining, Visual perceptual retraining, UE strengthening/ROM, Functional transfer training, Endurance training, Cognitive reorientation, Patient/family training, Equipment evaluation/education, Fine motor coordination activities, Compensatory technique education, Neuromuscular reeducation, Continued evaluation, Activityengagement, Energy conservation          See flowsheet documentation for full assessment, interventions and recommendations.   Note: Limitation: Decreased ADL status, Decreased UE ROM, Decreased UE strength, Decreased Safe judgement during ADL, Decreased cognition, Decreased endurance, Decreased sensation, Visual deficit, Decreased fine motor control, Decreased self-care trans, Decreased high-level ADLs  Prognosis: Fair  Assessment: Pt is a 38 yo male who actively participated in skilled OT session on 6/20/2024. Pt seen with PT to increase safety, decrease fall risk, and maximize functional/occupational performance 2* medical complexity which is a regression from pt's functional baseline. Treatment focused to improve static/dynamic balance, postural/trunk control, proper body mechanics, functional use of b/l UE's, higher level cognitive functions, safety awareness, and overall increased activity tolerance in ADL/IADL/leisure tasks. Upon arrival, pt found lying supine in bed and was agreeable to OT session. Pt performed supine <> sit with Max A x2 and sat EOB for approx 15 minutes with varying Min-Mod A sitting balance. Pt performed seated ADLs including grooming with Min A, UB dressing/bathing with Min A, and LB dressing with Max A. Engaged in various dynamic reaching activities to promote crossing midline, trunk rotation, dynamic balance, and functional ROM. At the end of the  session, pt was left lying supine in bed with all functional needs in reach. Pt demonstrates gradual functional improvements towards OT goals however continues to be functioning below occupational baseline and is still limited by the following limitations/impairments which were addressed through skilled instruction: cognition, functional ROM, coordination, generalized weakness, balance, endurance/activity tolerance, postural/trunk control, strength, pain, and safety awareness. At this time, recommend discharge to post-acute rehab when medically stable. The patient's raw score on the -PAC Daily Activity Inpatient Short Form is 14. A raw score of less than 19 suggests the patient may benefit from discharge to post-acute rehabilitation services. Please refer to the recommendation of the Occupational Therapist for safe discharge planning.  Established OT goals will be continued 2-3x/wk to address acute care needs and underlying performance skills to maximize occupational performance and safety to return to PLOF.     Rehab Resource Intensity Level, OT: I (Maximum Resource Intensity)

## 2024-06-20 NOTE — PHYSICAL THERAPY NOTE
PHYSICAL THERAPY NOTE          Patient Name: Hemanth Swanson  Today's Date: 6/20/2024 06/20/24 1318   PT Last Visit   PT Visit Date 06/20/24   Pain Assessment   Pain Assessment Tool 0-10   Pain Score No Pain   Restrictions/Precautions   Weight Bearing Precautions Per Order No   Other Precautions Cognitive;Chair Alarm;Bed Alarm;Multiple lines;Telemetry;O2;Fall Risk  (trach; PEG)   General   Family/Caregiver Present No   Cognition   Overall Cognitive Status Impaired   Attention Attends with cues to redirect   Orientation Level Unable to assess   Memory Unable to assess   Following Commands Follows one step commands with increased time or repetition   Subjective   Subjective Pt alert; lying in bed; agreeable to set EOB with PT and OT.   Bed Mobility   Supine to Sit 2  Maximal assistance   Additional items Assist x 2;HOB elevated;Bedrails;Increased time required;Verbal cues;LE management   Sit to Supine 2  Maximal assistance   Additional items Assist x 2;HOB elevated;Increased time required;Verbal cues;LE management   Transfers   Sit to Stand Unable to assess   Stand to Sit Unable to assess   Ambulation/Elevation   Gait pattern Not appropriate;Not tested   Balance   Static Sitting Poor +   Dynamic Sitting Poor +   Static Standing Zero   Activity Tolerance   Activity Tolerance Patient tolerated treatment well   Medical Staff Made Aware Co-treatment conducted w/ OT d/t medical complexity   Nurse Made Aware Spoke w/ JEAN-PAUL Soliman   Assessment   Prognosis Good   Problem List Decreased strength;Decreased range of motion;Decreased endurance;Impaired balance;Decreased mobility;Decreased coordination;Decreased cognition;Decreased safety awareness   Assessment Pt continuing to engage in PT session, once pt was able to sit EOB for 15 minutes with Min(A) and verbal cuing to sit upright. Pt still requires Max(A)x2 for bed mobility at this time  and ias unable to progress further in mobility at this time d/t significant weakness. Pt still limited by fatigue at this time. Pt will continue to benefit from PT services at this time until medically cleared for D/C; D/C recommendation are listed below; will continue to follow.   Barriers to Discharge Inaccessible home environment   Goals   Patient Goals Sit at EOB   STG Expiration Date 07/01/24   PT Treatment Day 1   Plan   Treatment/Interventions Functional transfer training;LE strengthening/ROM;Elevations;Therapeutic exercise;Endurance training;ADL retraining;Bed mobility;Gait training;Spoke to nursing;OT   PT Frequency 3-5x/wk   Discharge Recommendation   Rehab Resource Intensity Level, PT I (Maximum Resource Intensity)   Equipment Recommended Other (Comment)  (TBD)   AM-PAC Basic Mobility Inpatient   Turning in Flat Bed Without Bedrails 2   Lying on Back to Sitting on Edge of Flat Bed Without Bedrails 1   Moving Bed to Chair 1   Standing Up From Chair Using Arms 1   Walk in Room 1   Climb 3-5 Stairs With Railing 1   Basic Mobility Inpatient Raw Score 7   Turning Head Towards Sound 4   Follow Simple Instructions 3   Low Function Basic Mobility Raw Score  14   Low Function Basic Mobility Standardized Score  22.01   [facility] Highest Level Of Mobility   -HLM Goal 2: Bed activities/Dependent transfer   -HLM Achieved 3: Sit at edge of bed   Exercises   Knee AROM Long Arc Quad Sitting;10 reps;AROM;Bilateral   Ankle Pumps Sitting;10 reps;AROM;Bilateral   Balance training  Seated at EOB; 15 minutes worked on dynamic reaching   End of Consult   Patient Position at End of Consult Supine;Bed/Chair alarm activated;All needs within reach       Yeimi Leslie

## 2024-06-20 NOTE — PLAN OF CARE
Problem: Prexisting or High Potential for Compromised Skin Integrity  Goal: Skin integrity is maintained or improved  Description: INTERVENTIONS:  - Identify patients at risk for skin breakdown  - Assess and monitor skin integrity  - Assess and monitor nutrition and hydration status  - Monitor labs   - Assess for incontinence   - Turn and reposition patient  - Assist with mobility/ambulation  - Relieve pressure over bony prominences  - Avoid friction and shearing  - Provide appropriate hygiene as needed including keeping skin clean and dry  - Evaluate need for skin moisturizer/barrier cream  - Collaborate with interdisciplinary team   - Patient/family teaching  - Consider wound care consult   Outcome: Progressing     Problem: PAIN - ADULT  Goal: Verbalizes/displays adequate comfort level or baseline comfort level  Description: Interventions:  - Encourage patient to monitor pain and request assistance  - Assess pain using appropriate pain scale  - Administer analgesics based on type and severity of pain and evaluate response  - Implement non-pharmacological measures as appropriate and evaluate response  - Consider cultural and social influences on pain and pain management  - Notify physician/advanced practitioner if interventions unsuccessful or patient reports new pain  Outcome: Progressing     Problem: INFECTION - ADULT  Goal: Absence or prevention of progression during hospitalization  Description: INTERVENTIONS:  - Assess and monitor for signs and symptoms of infection  - Monitor lab/diagnostic results  - Monitor all insertion sites, i.e. indwelling lines, tubes, and drains  - Monitor endotracheal if appropriate and nasal secretions for changes in amount and color  - [place] appropriate cooling/warming therapies per order  - Administer medications as ordered  - Instruct and encourage patient and family to use good hand hygiene technique  - Identify and instruct in appropriate isolation precautions for  identified infection/condition  Outcome: Progressing  Goal: Absence of fever/infection during neutropenic period  Description: INTERVENTIONS:  - Monitor WBC    Outcome: Progressing     Problem: SAFETY ADULT  Goal: Patient will remain free of falls  Description: INTERVENTIONS:  - Educate patient/family on patient safety including physical limitations  - Instruct patient to call for assistance with activity   - Consult OT/PT to assist with strengthening/mobility   - Keep Call bell within reach  - Keep bed low and locked with side rails adjusted as appropriate  - Keep care items and personal belongings within reach  - Initiate and maintain comfort rounds  - Make Fall Risk Sign visible to staff  - Offer Toileting every 2 Hours, in advance of need  - Initiate/Maintain bed alarm  - Obtain necessary fall risk management equipment: bed alarm  - Apply yellow socks and bracelet for high fall risk patients  - Consider moving patient to room near nurses station  Outcome: Progressing  Goal: Maintain or return to baseline ADL function  Description: INTERVENTIONS:  -  Assess patient's ability to carry out ADLs; assess patient's baseline for ADL function and identify physical deficits which impact ability to perform ADLs (bathing, care of mouth/teeth, toileting, grooming, dressing, etc.)  - Assess/evaluate cause of self-care deficits   - Assess range of motion  - Assess patient's mobility; develop plan if impaired  - Assess patient's need for assistive devices and provide as appropriate  - Encourage maximum independence but intervene and supervise when necessary  - Involve family in performance of ADLs  - Assess for home care needs following discharge   - Consider OT consult to assist with ADL evaluation and planning for discharge  - Provide patient education as appropriate  Outcome: Progressing  Goal: Maintains/Returns to pre admission functional level  Description: INTERVENTIONS:  - Perform AM-PAC 6 Click Basic Mobility/ Daily  Activity assessment daily.  - Set and communicate daily mobility goal to care team and patient/family/caregiver.   - Collaborate with rehabilitation services on mobility goals if consulted  - Perform Range of Motion 4 times a day.  - Reposition patient every 2 hours.  - Dangle patient 0 times a day  - Stand patient 0 times a day  - Ambulate patient 0 times a day  - Out of bed to chair 0 times a day   - Out of bed for meals 0 times a day  - Out of bed for toileting  - Record patient progress and toleration of activity level   Outcome: Progressing     Problem: DISCHARGE PLANNING  Goal: Discharge to home or other facility with appropriate resources  Description: INTERVENTIONS:  - Identify barriers to discharge w/patient and caregiver  - Arrange for needed discharge resources and transportation as appropriate  - Identify discharge learning needs (meds, wound care, etc.)  - Arrange for interpretive services to assist at discharge as needed  - Refer to Case Management Department for coordinating discharge planning if the patient needs post-hospital services based on physician/advanced practitioner order or complex needs related to functional status, cognitive ability, or social support system  Outcome: Progressing     Problem: Knowledge Deficit  Goal: Patient/family/caregiver demonstrates understanding of disease process, treatment plan, medications, and discharge instructions  Description: Complete learning assessment and assess knowledge base.  Interventions:  - Provide teaching at level of understanding  - Provide teaching via preferred learning methods  Outcome: Progressing     Problem: Nutrition/Hydration-ADULT  Goal: Nutrient/Hydration intake appropriate for improving, restoring or maintaining nutritional needs  Description: Monitor and assess patient's nutrition/hydration status for malnutrition. Collaborate with interdisciplinary team and initiate plan and interventions as ordered.  Monitor patient's weight and  dietary intake as ordered or per policy. Utilize nutrition screening tool and intervene as necessary. Determine patient's food preferences and provide high-protein, high-caloric foods as appropriate.     INTERVENTIONS:  - Monitor oral intake, urinary output, labs, and treatment plans  - Assess nutrition and hydration status and recommend course of action  - Evaluate amount of meals eaten  - Assist patient with eating if necessary   - Allow adequate time for meals  - Recommend/ encourage appropriate diets, oral nutritional supplements, and vitamin/mineral supplements  - Order, calculate, and assess calorie counts as needed  - Recommend, monitor, and adjust tube feedings and TPN/PPN based on assessed needs  - Assess need for intravenous fluids  - Provide specific nutrition/hydration education as appropriate  - Include patient/family/caregiver in decisions related to nutrition  Outcome: Progressing     Problem: SAFETY,RESTRAINT: NV/NON-SELF DESTRUCTIVE BEHAVIOR  Goal: Remains free of harm/injury (restraint for non violent/non self-detsructive behavior)  Description: INTERVENTIONS:  - Instruct patient/family regarding restraint use   - Assess and monitor physiologic and psychological status   - Provide interventions and comfort measures to meet assessed patient needs   - Identify and implement measures to help patient regain control  - Assess readiness for release of restraint   Outcome: Progressing  Goal: Returns to optimal restraint-free functioning  Description: INTERVENTIONS:  - Assess the patient's behavior and symptoms that indicate continued need for restraint  - Identify and implement measures to help patient regain control  - Assess readiness for release of restraint   Outcome: Progressing

## 2024-06-20 NOTE — PROGRESS NOTES
[facility]  Progress Note: Critical Care  Name: Hemanth Swanson 39 y.o. 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  Testicular seminoma, s/p ochiectomy/chemotherapy  Tracheostomy and peg dependence  History of pulmonary embolism on Eliquis  Possible CROW     Neuro:   Diagnosis: Seizure-like activity  EEG unremarkable, Keppra discontinued   Brain MRI: no acute intracranial pathology  Neurology following, appreciate recommendations  Diagnosis: Neuromuscular disorder  Patient has completed both outpatient and inpatient workup without any definitive diagnosis  Possible seronegative myasthenia variation due to chemo vs anoxic brain injury  NPO following vomiting episode yesterday; to continue resuming tube feeds versus oral diet yesterday  Neurology following, appreciate recommendations; now signed off  Diagnosis: Altered mental status   In the setting of hypoxia and bradycardia, patient otherwise alert and oriented; lactic and ammonia obtained on arrival wnl  Plan:   Delirium precautions  Monitor oxygen saturation  Maintain sleep wake cycle     CV:   Diagnosis: Bradycardia  Patient continues with episodes of bradycardia, clinically suspect due to mucous plugging causing hypoxia   Cardiac monitoring  Atropine prn  Diagnosis: s/p cardiac arrest with ROSC  Patient had episode of pulseless asystole without hypoxia, ROSC obtained after 2 rounds ACLS with clearance of secretions on 5/31  Echo 6/11 showing EF of 65%  Plan:  Continuous cardiac monitoring  Frequent suctioning  Diagnosis: HFpEF  Echo 6/11 showing EF of 65%  Echo in 8/23 showing EF 60%  Daily weights  Strict Is and Os      Pulm:  Diagnosis: Ventilator associated multifocal pneumonia  Plan: Home Trilogy ventilator settings AC/VC /RR 18/Peep 6 + 6 L O2 into ventilator. Uses albuterol q6 and mucomyst q6  Xopenex and hypertonic saline nebs  Trach now upsized to 8 XLT on 6/19  Frequent suctioning  Chest PT  Intermittent episodes of hypoxia  ABG on admission showing elevated PCO2 at 54.2, normal PO2 at 102.5, bicarb elevated at 34.8  Status post bronchoscopy 6/12, 6/16  Improves with bagging  Diagnosis: History pulmonary embolism  Hospitalized for right lower lobe segmental PE in August 2023, on eliquis since discharge  Transitioned to therapeutic Lovenox 6/14 in preparation for trach upsizing; now back on Eliquis     GI:   PEG tube dislodgement  Overnight into 6/18, patient self-removed PEG tube; Sheppard placed to maintain tract patency  S/p replacement on 6/19 without issue     :   No acute issues  Limited urine output data, external urinary catheter does not fit properly  Limited urine output, continue to monitor  BUN and creatinine within normal limits, EGFR appears normal  Continue to monitor urine output     F/E/N:   F: none  E: monitor and replete as needed  N:  NPO currently following vomiting yesterday (tube feeds: Jevity 1.5 continuous 70 cc/hr w/ 150 cc free water flushes q4h); to consider resuming oral diet versus tube feeds as above     Heme/Onc:   DVT prophylaxis: Eliquis  Leukocytosis in the setting of multifocal pneumonia, now treated  Platelets stable  Hgb stable, no active signs of bleeding  Continue to monitor CBC     Endo:   No acute problems  TSH and am cortisol wnl     ID:   Diagnosis: Ventilator associated multifocal pneumonia  Plan:   Pseudomonas and serretia grown from bronchial washings  Patient status post 10 days antibiotics for treatment of ventilator associated pneumonia  No further plan for antibiotic treatment at this time  Chest x-ray 6/12 appears improved    Disposition: Critical care    ICU Core  Measures     Vented Patient  VAP Bundle  VAP bundle ordered     A: Assess, Prevent, and Manage Pain Has pain been assessed? Yes  Need for changes to pain regimen? No   B: Both Spontaneous Awakening Trials (SATs) and Spontaneous Breathing Trials (SBTs) Plan to perform spontaneous awakening trial today? N/A   Plan to perform spontaneous breathing trial today? Chronic vent   Obvious barriers to extubation? Yes   C: Choice of Sedation RASS Goal: 0 Alert and Calm  Need for changes to sedation or analgesia regimen? No   D: Delirium CAM-ICU: Negative   E: Early Mobility  Plan for early mobility? Yes   F: Family Engagement Plan for family engagement today? Yes       Review of Invasive Devices:      Central access plan:  chronic lines      Prophylaxis:  VTE VTE covered by:  apixaban, Oral, 5 mg at 06/19/24 9009       Stress Ulcer  not ordered        Significant 24hr Events     24hr events: Yesterday, patient's trach was changed again to an XLT due to concerns for air leak. He was cleared by Speech for a pureed diet; however, he later had an episode of vomiting. Repeat CXR obtained and NG tube placed for decompression. Today, patient is without acute complaints.      Subjective     Review of Systems: See HPI for Review of Systems     Objective                            Vitals I/O      Most Recent Min/Max in 24hrs   Temp 98.8 °F (37.1 °C) Temp  Min: 98.8 °F (37.1 °C)  Max: 100.6 °F (38.1 °C)   Pulse 75 Pulse  Min: 62  Max: 91   Resp 20 Resp  Min: 13  Max: 28   /63 BP  Min: 94/61  Max: 158/90   O2 Sat 99 % SpO2  Min: 94 %  Max: 100 %      Intake/Output Summary (Last 24 hours) at 6/20/2024 0533  Last data filed at 6/20/2024 0400  Gross per 24 hour   Intake 660 ml   Output 500 ml   Net 160 ml       Diet NPO    Invasive Monitoring           Physical Exam   Physical Exam  Vitals reviewed.   Eyes:      Extraocular Movements: Extraocular movements intact.      Conjunctiva/sclera: Conjunctivae normal.      Pupils: Pupils are  equal, round, and reactive to light.   Skin:     General: Skin is warm and dry.      Capillary Refill: Capillary refill takes less than 2 seconds.   HENT:      Head: Normocephalic and atraumatic.      Nose: No congestion or rhinorrhea.      Comments: NG tube in place     Mouth/Throat:      Mouth: Mucous membranes are moist.   Neck:      Trachea: Tracheostomy present.   Cardiovascular:      Rate and Rhythm: Normal rate and regular rhythm.   Musculoskeletal:         General: No swelling or tenderness.      Right lower leg: No edema.      Left lower leg: No edema.   Abdominal: General: Bowel sounds are normal.      Palpations: Abdomen is soft.   Constitutional:       General: He is not in acute distress.     Appearance: He is not ill-appearing.      Interventions: He is restrained.   Pulmonary:      Effort: Pulmonary effort is normal. No respiratory distress.      Comments: Slightly coarse, mechanical breath sounds  Neurological:      General: No focal deficit present.      Mental Status: He is alert. He is calm.      Motor: Strength full and intact in all extremities.   Genitourinary/Anorectal:  external catheter present.          Diagnostic Studies      EKG: No new  Imaging: I have personally reviewed pertinent reports.       Medications:  Scheduled PRN   apixaban, 5 mg, BID  chlorhexidine, 15 mL, Q12H ISAEL  fentaNYL, 100 mcg, Once  FLUoxetine, 10 mg, Daily  guaiFENesin, 200 mg, Q6H  levalbuterol, 1.25 mg, Q6H  JANES ANTIFUNGAL, , BID  sodium chloride, 4 mL, Q6H      acetaminophen, 1,000 mg, Q6H PRN  albuterol, 2.5 mg, Q4H PRN  atropine, 0.5 mg, Once PRN  glycopyrrolate, 0.1 mg, Q4H PRN  ondansetron, 4 mg, Q6H PRN       Continuous          Labs:    CBC    Recent Labs     06/19/24  0941 06/20/24  0441   WBC 6.02 6.29   HGB 12.0 11.8*   HCT 37.1 37.9    255     BMP    Recent Labs     06/19/24  0941 06/20/24  0441   SODIUM 138 143   K 4.1 3.8   CL 98 99   CO2 36* 36*   AGAP 4 8   BUN 12 11   CREATININE 0.46* 0.53*    CALCIUM 9.0 9.3       Coags    No recent results     Additional Electrolytes  No recent results       Blood Gas    No recent results  No recent results LFTs  No recent results    Infectious  No recent results  Glucose  Recent Labs     06/19/24  0941 06/20/24  0441   GLUC 96 91               Manny Evans MD

## 2024-06-20 NOTE — CASE MANAGEMENT
Case Management Discharge Planning Note    Patient name Hemanth Swanson  [id number]  : 1985 Date 2024       Current Admission Date: 6/10/2024  Current Admission Diagnosis:Status post tracheostomy (HCC)   Patient Active Problem List    Diagnosis Date Noted Date Diagnosed    Ventilator associated pneumonia (HCC) 2024     Cardiac arrest due to other underlying condition ([facility]) 2024     Seizure-like activity ([facility]) 2024     Bradycardia 2024     Ventilator dependent ([facility]) 2024     Neuromuscular disorder ([facility]) 2024     Obesity hypoventilation syndrome ([facility]) 10/24/2023     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     Psychophysiological insomnia 10/18/2023     Impaired physical mobility 2023     Status post tracheostomy ([facility]) 2023     S/P percutaneous endoscopic gastrostomy (PEG) tube placement ([facility]) 2023     Anxiety 2023     Chronic respiratory failure with hypoxia and hypercapnia ([facility]) 2023     Sepsis ([facility]) 2023     Acute pulmonary embolism without acute cor pulmonale ([facility]) 2023     Depression 2023     Neuromuscular weakness ([facility]) 2023     History of LVH (left ventricular hypertrophy) 2023     Pure hypertriglyceridemia 2023     Unilateral vestibular schwannoma ([facility]) 2023     Port-A-Cath in place 2023     Diplopia 2023     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HCC) 2023     Umbilical hernia without obstruction and without gangrene 12/10/2022     Tobacco use 12/10/2022     Retroperitoneal lymphadenopathy 12/10/2022       LOS (days): 10  Geometric Mean LOS (GMLOS) (days):   Days to GMLOS:     OBJECTIVE:  Risk of Unplanned Readmission Score: 33.08         Current admission status: Inpatient   Preferred Pharmacy:   [id number]  [place] - [address] [address]  Phone: [phone] Fax:  [phone]    Primary Care Provider: Ela Douglas MD    Primary Insurance: [facility]  Secondary Insurance:     DISCHARGE DETAILS:                Contacts  Patient Contacts: Dmitry Swanson (sister)  Relationship to Patient:: Family  Contact Method: Phone  Phone Number: [phone]  Reason/Outcome: Discharge Planning, Continuity of Care              Other Referral/Resources/Interventions Provided:  Referral Comments: Called  [facility] coordinator w/[facility] [phone] to discuss dcp and setting up PT/OT in home.  Left message - awaiting callback.

## 2024-06-20 NOTE — OCCUPATIONAL THERAPY NOTE
Occupational Therapy Progress Note     Patient Name: Hemanth Swanson  Today's Date: 6/20/2024  Problem List  Principal Problem:    Status post tracheostomy (HCC)  Active Problems:    Umbilical hernia without obstruction and without gangrene    Depression    S/P percutaneous endoscopic gastrostomy (PEG) tube placement (HCC)    Ventilator dependent (HCC)    Seizure-like activity (HCC)    Ventilator associated pneumonia (HCC)    Cardiac arrest due to other underlying condition (HCC)            06/20/24 1317   OT Last Visit   OT Visit Date 06/20/24   Note Type   Note Type Treatment   Pain Assessment   Pain Assessment Tool 0-10   Pain Score No Pain   Hospital Pain Intervention(s) Repositioned;Ambulation/increased activity;Environmental changes   Restrictions/Precautions   Weight Bearing Precautions Per Order No   Other Precautions Cognitive;Chair Alarm;Bed Alarm;Multiple lines;Telemetry;Fall Risk;Pain;O2  (trach to vent, PEG, hx of anoxic brain injury)   Lifestyle   Autonomy At baseline, pt reports being I with ADLs and fnxl mobility, family A with IADLs   Reciprocal Relationships Family - per chart, pt has 24/7 support   Intrinsic Gratification Likes the cowboys and watching TV   ADL   Where Assessed Edge of bed   Grooming Assistance 4  Minimal Assistance   Grooming Deficit Setup;Steadying;Verbal cueing;Supervision/safety;Increased time to complete;Brushing hair   UB Bathing Assistance 3  Moderate Assistance   UB Bathing Deficit Setup;Steadying;Verbal cueing;Supervision/safety;Increased time to complete   UB Dressing Assistance 3  Moderate Assistance   UB Dressing Deficit Setup;Steadying;Verbal cueing;Supervision/safety;Increased time to complete;Thread RUE;Thread LUE;Pull over head;Pull around back;Pull down in back   LB Dressing Assistance 2  Maximal Assistance   LB Dressing Deficit Don/doff R sock;Don/doff L sock   Bed Mobility   Supine to Sit 2  Maximal assistance   Additional items Assist x 2;HOB  "elevated;Bedrails;Increased time required;Verbal cues;LE management   Sit to Supine 2  Maximal assistance   Additional items Assist x 2;HOB elevated;Bedrails;Increased time required;Verbal cues;LE management   Additional Comments Sat EOB for approx 15 minutes with varying Min-Mod A and engaged in various seated ADLs + dynamic reaching balance activities; @ end of session, pt left lying supine in bed with all functional needs in reach   Transfers   Sit to Stand Unable to assess   Stand to Sit Unable to assess   Subjective   Subjective \"Hi\"   Cognition   Overall Cognitive Status Impaired   Arousal/Participation Responsive;Arousable;Cooperative   Attention Attends with cues to redirect   Following Commands Follows one step commands with increased time or repetition   Comments Pt pleasant and cooperative; flat affect, follows simple 1 step commands, communicating via mouthing words, head nods, thumbs up/down   Activity Tolerance   Activity Tolerance Patient tolerated treatment well;Patient limited by fatigue   Medical Staff Made Aware MARIA A MarieT; LONDON Jalloh; JEAN-PAUL Kirsten cleared   Assessment   Assessment Pt is a 40 yo male who actively participated in skilled OT session on 6/20/2024. Pt seen with PT to increase safety, decrease fall risk, and maximize functional/occupational performance 2* medical complexity which is a regression from pt's functional baseline. Treatment focused to improve static/dynamic balance, postural/trunk control, proper body mechanics, functional use of b/l UE's, higher level cognitive functions, safety awareness, and overall increased activity tolerance in ADL/IADL/leisure tasks. Upon arrival, pt found lying supine in bed and was agreeable to OT session. Pt performed supine <> sit with Max A x2 and sat EOB for approx 15 minutes with varying Min-Mod A sitting balance. Pt performed seated ADLs including grooming with Min A, UB dressing/bathing with Min A, and LB dressing with Max A. Engaged in " various dynamic reaching activities to promote crossing midline, trunk rotation, dynamic balance, and functional ROM. At the end of the session, pt was left lying supine in bed with all functional needs in reach. Pt demonstrates gradual functional improvements towards OT goals however continues to be functioning below occupational baseline and is still limited by the following limitations/impairments which were addressed through skilled instruction: cognition, functional ROM, coordination, generalized weakness, balance, endurance/activity tolerance, postural/trunk control, strength, pain, and safety awareness. At this time, recommend discharge to post-acute rehab when medically stable. The patient's raw score on the AM-PAC Daily Activity Inpatient Short Form is 14. A raw score of less than 19 suggests the patient may benefit from discharge to post-acute rehabilitation services. Please refer to the recommendation of the Occupational Therapist for safe discharge planning.  Established OT goals will be continued 2-3x/wk to address acute care needs and underlying performance skills to maximize occupational performance and safety to return to [facility].   Plan   Treatment Interventions ADL retraining;Functional transfer training;UE strengthening/ROM;Endurance training;Cognitive reorientation;Patient/family training;Equipment evaluation/education;Compensatory technique education;Continued evaluation;Energy conservation;Activityengagement   Goal Expiration Date 07/01/24   OT Treatment Day 1   OT Frequency 2-3x/wk   Discharge Recommendation   Rehab Resource Intensity Level, OT I (Maximum Resource Intensity)   AM-PAC Daily Activity Inpatient   Lower Body Dressing 2   Bathing 2   Toileting 2   Upper Body Dressing 2   Grooming 3   Eating 3   Daily Activity Raw Score 14   Daily Activity Standardized Score (Calc for Raw Score >=11) 33.39   AM-PAC Applied Cognition Inpatient   Following a Speech/Presentation 2   Understanding Ordinary  Conversation 3   Taking Medications 3   Remembering Where Things Are Placed or Put Away 3   Remembering List of 4-5 Errands 2   Taking Care of Complicated Tasks 2   Applied Cognition Raw Score 15   Applied Cognition Standardized Score 33.54   End of Consult   Education Provided Yes   Patient Position at End of Consult Supine;Bed/Chair alarm activated;All needs within reach   Nurse Communication Nurse aware of consult       Marta Higgins MS, OTR/L

## 2024-06-20 NOTE — RESPIRATORY THERAPY NOTE
RT Ventilator Management Note  Hemanth Swanson 39 y.o. male MRN: [id number]  [id number]      Daily Screen         6/19/2024  0813 6/20/2024  0807          Patient safety screen outcome:: Failed Failed      Not Ready for Weaning due to:: Underline problem not resolved Underline problem not resolved                Physical Exam:   Assessment Type: Assess only  General Appearance: Sleeping  Respiratory Pattern: Assisted  Chest Assessment: Chest expansion symmetrical  Bilateral Breath Sounds: Coarse, Diminished  R Breath Sounds: Coarse  L Breath Sounds: Coarse  Cough: Productive  Suction: Trach  O2 Device: C3 vent      Resp Comments: (P) pt is vent dependant no sbt performed will contiue to monitor

## 2024-06-21 ENCOUNTER — APPOINTMENT (INPATIENT)
Dept: RADIOLOGY | Facility: HOSPITAL | Age: 39
DRG: 720 | End: 2024-06-21
Payer: COMMERCIAL

## 2024-06-21 LAB
CK SERPL-CCNC: 31 U/L (ref 39–308)
TESTOST FREE SERPL-MCNC: 1.2 PG/ML (ref 8.7–25.1)
TESTOST SERPL-MCNC: 19 NG/DL (ref 264–916)

## 2024-06-21 PROCEDURE — 82300 ASSAY OF CADMIUM: CPT

## 2024-06-21 PROCEDURE — 94760 N-INVAS EAR/PLS OXIMETRY 1: CPT

## 2024-06-21 PROCEDURE — 71045 X-RAY EXAM CHEST 1 VIEW: CPT

## 2024-06-21 PROCEDURE — 94640 AIRWAY INHALATION TREATMENT: CPT

## 2024-06-21 PROCEDURE — 94003 VENT MGMT INPAT SUBQ DAY: CPT

## 2024-06-21 PROCEDURE — 99233 SBSQ HOSP IP/OBS HIGH 50: CPT | Performed by: INTERNAL MEDICINE

## 2024-06-21 PROCEDURE — 83655 ASSAY OF LEAD: CPT

## 2024-06-21 PROCEDURE — 92526 ORAL FUNCTION THERAPY: CPT

## 2024-06-21 PROCEDURE — 82550 ASSAY OF CK (CPK): CPT

## 2024-06-21 PROCEDURE — 82175 ASSAY OF ARSENIC: CPT

## 2024-06-21 PROCEDURE — 83825 ASSAY OF MERCURY: CPT

## 2024-06-21 PROCEDURE — 94664 DEMO&/EVAL PT USE INHALER: CPT

## 2024-06-21 RX ORDER — MIDAZOLAM HYDROCHLORIDE 2 MG/2ML
INJECTION, SOLUTION INTRAMUSCULAR; INTRAVENOUS
Status: DISCONTINUED
Start: 2024-06-21 | End: 2024-06-21 | Stop reason: WASHOUT

## 2024-06-21 RX ORDER — TESTOSTERONE CYPIONATE 200 MG/ML
50 INJECTION, SOLUTION INTRAMUSCULAR ONCE
Status: COMPLETED | OUTPATIENT
Start: 2024-06-21 | End: 2024-06-21

## 2024-06-21 RX ADMIN — GUAIFENESIN 200 MG: 100 SOLUTION ORAL at 03:33

## 2024-06-21 RX ADMIN — CHLORHEXIDINE GLUCONATE 0.12% ORAL RINSE 15 ML: 1.2 LIQUID ORAL at 21:12

## 2024-06-21 RX ADMIN — SODIUM CHLORIDE SOLN NEBU 3% 4 ML: 3 NEBU SOLN at 01:34

## 2024-06-21 RX ADMIN — LEVALBUTEROL HYDROCHLORIDE 1.25 MG: 1.25 SOLUTION RESPIRATORY (INHALATION) at 01:34

## 2024-06-21 RX ADMIN — APIXABAN 5 MG: 5 TABLET, FILM COATED ORAL at 08:23

## 2024-06-21 RX ADMIN — CHLORHEXIDINE GLUCONATE 0.12% ORAL RINSE 15 ML: 1.2 LIQUID ORAL at 08:23

## 2024-06-21 RX ADMIN — SODIUM CHLORIDE SOLN NEBU 3% 4 ML: 3 NEBU SOLN at 19:48

## 2024-06-21 RX ADMIN — GUAIFENESIN 200 MG: 100 SOLUTION ORAL at 16:05

## 2024-06-21 RX ADMIN — LEVALBUTEROL HYDROCHLORIDE 1.25 MG: 1.25 SOLUTION RESPIRATORY (INHALATION) at 19:47

## 2024-06-21 RX ADMIN — SODIUM CHLORIDE SOLN NEBU 3% 4 ML: 3 NEBU SOLN at 14:59

## 2024-06-21 RX ADMIN — LEVALBUTEROL HYDROCHLORIDE 1.25 MG: 1.25 SOLUTION RESPIRATORY (INHALATION) at 14:59

## 2024-06-21 RX ADMIN — MICONAZOLE NITRATE: 20 CREAM TOPICAL at 09:37

## 2024-06-21 RX ADMIN — MICONAZOLE NITRATE: 20 CREAM TOPICAL at 18:18

## 2024-06-21 RX ADMIN — Medication 3 MG: at 21:12

## 2024-06-21 RX ADMIN — DULOXETINE HYDROCHLORIDE 30 MG: 30 CAPSULE, DELAYED RELEASE ORAL at 08:23

## 2024-06-21 RX ADMIN — GUAIFENESIN 200 MG: 100 SOLUTION ORAL at 21:12

## 2024-06-21 RX ADMIN — GUAIFENESIN 200 MG: 100 SOLUTION ORAL at 08:23

## 2024-06-21 RX ADMIN — LEVALBUTEROL HYDROCHLORIDE 1.25 MG: 1.25 SOLUTION RESPIRATORY (INHALATION) at 07:51

## 2024-06-21 RX ADMIN — TESTOSTERONE CYPIONATE 50 MG: 200 INJECTION, SOLUTION INTRAMUSCULAR at 16:56

## 2024-06-21 RX ADMIN — APIXABAN 5 MG: 5 TABLET, FILM COATED ORAL at 17:01

## 2024-06-21 RX ADMIN — SODIUM CHLORIDE SOLN NEBU 3% 4 ML: 3 NEBU SOLN at 07:51

## 2024-06-21 NOTE — QUICK NOTE
"Patient's neuro exam has been slowly improving daily. More mobility of his legs in bed today and would follow commands in all  extremities. Patient has never received formal diagnosis of his neuromuscular disease. Discussed with Neurology and plan was to offer EMG while inpatient due to circumstances of transportation and follow up. Additionally, ordered heavy metal screen, CK, and Pompe disease testing. Labs were sent.    Discussed with patient's sister Mirella about EMG to try and find formal diagnosis. I explicitly stated that if we find a formal diagnosis then we can try and treat it/have a formal plan in place/prognosis. Sister declined multiple times stating \"He wants to come home and he is coming home tonight and you arrange transportation for outpatient follow up for testing.\"   "

## 2024-06-21 NOTE — RESPIRATORY THERAPY NOTE
RT Ventilator Management Note  Hemanth Swanson 39 y.o. male MRN: [id number]  [facility] [id number]      Daily Screen         6/20/2024  0807 6/21/2024  0752          Patient safety screen outcome:: Failed Passed      Not Ready for Weaning due to:: Underline problem not resolved Underline problem not resolved                Physical Exam:   Assessment Type: During-treatment  General Appearance: Awake  Respiratory Pattern: Assisted  Chest Assessment: Chest expansion symmetrical  Bilateral Breath Sounds: Diminished      Resp Comments: pt currently on cmv settings pt is vent depnedant no sbt performed will continue to monitor

## 2024-06-21 NOTE — CASE MANAGEMENT
Case Management Discharge Planning Note    Patient name Hemanth Swanson  [id number]  : 1985 Date 2024       Current Admission Date: 6/10/2024  Current Admission Diagnosis:Status post tracheostomy (HCC)   Patient Active Problem List    Diagnosis Date Noted Date Diagnosed    Ventilator associated pneumonia (HCC) 2024     Cardiac arrest due to other underlying condition ([facility]) 2024     Seizure-like activity ([facility]) 2024     Bradycardia 2024     Ventilator dependent ([facility]) 2024     Neuromuscular disorder ([facility]) 2024     Obesity hypoventilation syndrome ([facility]) 10/24/2023     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     Psychophysiological insomnia 10/18/2023     Impaired physical mobility 2023     Status post tracheostomy ([facility]) 2023     S/P percutaneous endoscopic gastrostomy (PEG) tube placement ([facility]) 2023     Anxiety 2023     Chronic respiratory failure with hypoxia and hypercapnia ([facility]) 2023     Sepsis ([facility]) 2023     Acute pulmonary embolism without acute cor pulmonale ([facility]) 2023     Depression 2023     Neuromuscular weakness ([facility]) 2023     History of LVH (left ventricular hypertrophy) 2023     Pure hypertriglyceridemia 2023     Unilateral vestibular schwannoma ([facility]) 2023     Port-A-Cath in place 2023     Diplopia 2023     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HCC) 2023     Umbilical hernia without obstruction and without gangrene 12/10/2022     Tobacco use 12/10/2022     Retroperitoneal lymphadenopathy 12/10/2022       LOS (days): 11  Geometric Mean LOS (GMLOS) (days):   Days to GMLOS:     OBJECTIVE:  Risk of Unplanned Readmission Score: 33.47         Current admission status: Inpatient   Preferred Pharmacy:   [id number]  [place] - [address] [address]  Phone: [phone] Fax:  [phone]    Primary Care Provider: Ela Douglas MD    Primary Insurance: [facility]  Secondary Insurance:     DISCHARGE DETAILS:                  Other Referral/Resources/Interventions Provided:  Referral Comments: Received tc from Alexia - [facility] coordinator w/[facility] [phone] discussed arranging PT/OT in home for pt when pt discharges. Stated she will see if she can arrange something for pt.  Notified Alexia anticipate dc home today. Stated pt has alot of support at home. Pt lives w/his mother & sister and extended family.  Pt's sister Dmitry and mother are primary caregivers for pt.  Alexia plans on setting up a home visit w/pt for Tuesday 6/25.  Received tc from Hilario russ/[employer] - confirmed the trach supplies have been delivered.  Explained pt not stable for dc today.  Will need to notify [facility] when pt discharging so their Resp Therapist can be at the home when pt arrives.  Left vm for Hilario russ/[facility] notifying of same requesting a phone # to call to notify if pt discharges - awaiting call back.  NOtified by provider anticipate pt will dc home tomorrow - request to set up transport later afternoon - transport request submitted via [employer] - awaiting confirmation of transport time.  Called & left a vm for pt's sister Dmitry to notify of same - awaiting callback.

## 2024-06-21 NOTE — PLAN OF CARE
Problem: Prexisting or High Potential for Compromised Skin Integrity  Goal: Skin integrity is maintained or improved  Description: INTERVENTIONS:  - Identify patients at risk for skin breakdown  - Assess and monitor skin integrity  - Assess and monitor nutrition and hydration status  - Monitor labs   - Assess for incontinence   - Turn and reposition patient  - Assist with mobility/ambulation  - Relieve pressure over bony prominences  - Avoid friction and shearing  - Provide appropriate hygiene as needed including keeping skin clean and dry  - Evaluate need for skin moisturizer/barrier cream  - Collaborate with interdisciplinary team   - Patient/family teaching  - Consider wound care consult   Outcome: Progressing     Problem: PAIN - ADULT  Goal: Verbalizes/displays adequate comfort level or baseline comfort level  Description: Interventions:  - Encourage patient to monitor pain and request assistance  - Assess pain using appropriate pain scale  - Administer analgesics based on type and severity of pain and evaluate response  - Implement non-pharmacological measures as appropriate and evaluate response  - Consider cultural and social influences on pain and pain management  - Notify physician/advanced practitioner if interventions unsuccessful or patient reports new pain  Outcome: Progressing     Problem: INFECTION - ADULT  Goal: Absence or prevention of progression during hospitalization  Description: INTERVENTIONS:  - Assess and monitor for signs and symptoms of infection  - Monitor lab/diagnostic results  - Monitor all insertion sites, i.e. indwelling lines, tubes, and drains  - Monitor endotracheal if appropriate and nasal secretions for changes in amount and color  - [place] appropriate cooling/warming therapies per order  - Administer medications as ordered  - Instruct and encourage patient and family to use good hand hygiene technique  - Identify and instruct in appropriate isolation precautions for  identified infection/condition  Outcome: Progressing  Goal: Absence of fever/infection during neutropenic period  Description: INTERVENTIONS:  - Monitor WBC    Outcome: Progressing    Goal: Maintain or return to baseline ADL function  Description: INTERVENTIONS:  -  Assess patient's ability to carry out ADLs; assess patient's baseline for ADL function and identify physical deficits which impact ability to perform ADLs (bathing, care of mouth/teeth, toileting, grooming, dressing, etc.)  - Assess/evaluate cause of self-care deficits   - Assess range of motion  - Assess patient's mobility; develop plan if impaired  - Assess patient's need for assistive devices and provide as appropriate  - Encourage maximum independence but intervene and supervise when necessary  - Involve family in performance of ADLs  - Assess for home care needs following discharge   - Consider OT consult to assist with ADL evaluation and planning for discharge  - Provide patient education as appropriate  Outcome: Progressing     Problem: DISCHARGE PLANNING  Goal: Discharge to home or other facility with appropriate resources  Description: INTERVENTIONS:  - Identify barriers to discharge w/patient and caregiver  - Arrange for needed discharge resources and transportation as appropriate  - Identify discharge learning needs (meds, wound care, etc.)  - Arrange for interpretive services to assist at discharge as needed  - Refer to Case Management Department for coordinating discharge planning if the patient needs post-hospital services based on physician/advanced practitioner order or complex needs related to functional status, cognitive ability, or social support system  Outcome: Progressing     Problem: Knowledge Deficit  Goal: Patient/family/caregiver demonstrates understanding of disease process, treatment plan, medications, and discharge instructions  Description: Complete learning assessment and assess knowledge base.  Interventions:  - Provide  teaching at level of understanding  - Provide teaching via preferred learning methods  Outcome: Progressing     Problem: Nutrition/Hydration-ADULT  Goal: Nutrient/Hydration intake appropriate for improving, restoring or maintaining nutritional needs  Description: Monitor and assess patient's nutrition/hydration status for malnutrition. Collaborate with interdisciplinary team and initiate plan and interventions as ordered.  Monitor patient's weight and dietary intake as ordered or per policy. Utilize nutrition screening tool and intervene as necessary. Determine patient's food preferences and provide high-protein, high-caloric foods as appropriate.     INTERVENTIONS:  - Monitor oral intake, urinary output, labs, and treatment plans  - Assess nutrition and hydration status and recommend course of action  - Evaluate amount of meals eaten  - Assist patient with eating if necessary   - Allow adequate time for meals  - Recommend/ encourage appropriate diets, oral nutritional supplements, and vitamin/mineral supplements  - Order, calculate, and assess calorie counts as needed  - Recommend, monitor, and adjust tube feedings and TPN/PPN based on assessed needs  - Assess need for intravenous fluids  - Provide specific nutrition/hydration education as appropriate  - Include patient/family/caregiver in decisions related to nutrition  Outcome: Progressing     Problem: SAFETY,RESTRAINT: NV/NON-SELF DESTRUCTIVE BEHAVIOR  Goal: Remains free of harm/injury (restraint for non violent/non self-detsructive behavior)  Description: INTERVENTIONS:  - Instruct patient/family regarding restraint use   - Assess and monitor physiologic and psychological status   - Provide interventions and comfort measures to meet assessed patient needs   - Identify and implement measures to help patient regain control  - Assess readiness for release of restraint   Outcome: Progressing  Goal: Returns to optimal restraint-free functioning  Description:  INTERVENTIONS:  - Assess the patient's behavior and symptoms that indicate continued need for restraint  - Identify and implement measures to help patient regain control  - Assess readiness for release of restraint   Outcome: Progressing

## 2024-06-21 NOTE — CASE MANAGEMENT
Case Management Discharge Planning Note    Patient name Hemanth Swanson  [id number]  : 1985 Date 2024       Current Admission Date: 6/10/2024  Current Admission Diagnosis:Status post tracheostomy (HCC)   Patient Active Problem List    Diagnosis Date Noted Date Diagnosed    Ventilator associated pneumonia (HCC) 2024     Cardiac arrest due to other underlying condition ([facility]) 2024     Seizure-like activity ([facility]) 2024     Bradycardia 2024     Ventilator dependent ([facility]) 2024     Neuromuscular disorder ([facility]) 2024     Obesity hypoventilation syndrome ([facility]) 10/24/2023     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     Psychophysiological insomnia 10/18/2023     Impaired physical mobility 2023     Status post tracheostomy ([facility]) 2023     S/P percutaneous endoscopic gastrostomy (PEG) tube placement ([facility]) 2023     Anxiety 2023     Chronic respiratory failure with hypoxia and hypercapnia ([facility]) 2023     Sepsis ([facility]) 2023     Acute pulmonary embolism without acute cor pulmonale ([facility]) 2023     Depression 2023     Neuromuscular weakness ([facility]) 2023     History of LVH (left ventricular hypertrophy) 2023     Pure hypertriglyceridemia 2023     Unilateral vestibular schwannoma ([facility]) 2023     Port-A-Cath in place 2023     Diplopia 2023     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HCC) 2023     Umbilical hernia without obstruction and without gangrene 12/10/2022     Tobacco use 12/10/2022     Retroperitoneal lymphadenopathy 12/10/2022       LOS (days): 11  Geometric Mean LOS (GMLOS) (days):   Days to GMLOS:     OBJECTIVE:  Risk of Unplanned Readmission Score: 33.47         Current admission status: Inpatient   Preferred Pharmacy:   [id number]  [place] - [address] [address]  Phone: [phone] Fax:  [phone]    Primary Care Provider: Ela Douglas MD    Primary Insurance: [facility]  Secondary Insurance:     DISCHARGE DETAILS:          Other Referral/Resources/Interventions Provided:  Referral Comments: [facility] requesting an order stationary pulseox - order signed by provider & sent to [facility] for processing.  Spoke to pt's sister notified of same and awaiting confirmation of transportation time tomorrow.  Verbalized understanding & in agreement w/dc.     Per Dmitry she has received the trach supplies, tube feeds supplies & nicky.

## 2024-06-21 NOTE — PROGRESS NOTES
[facility]  Progress Note: Critical Care  Name: Hemanth Swanson 39 y.o. 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  Testicular seminoma, s/p ochiectomy/chemotherapy  Tracheostomy and peg dependence  History of pulmonary embolism on Eliquis  Possible CROW     Neuro:   Diagnosis: Seizure-like activity  EEG unremarkable, Keppra discontinued   Brain MRI: no acute intracranial pathology  Neurology following, appreciate recommendations  Diagnosis: Neuromuscular disorder  Patient has completed both outpatient and inpatient workup without any definitive diagnosis  Possible seronegative myasthenia variation due to chemo vs anoxic brain injury  To discuss with Neuro today regarding if any tests can be ordered now  Neurology following, appreciate recommendations; now signed off  Diagnosis: Altered mental status   In the setting of hypoxia and bradycardia, patient otherwise alert and oriented; lactic and ammonia obtained on arrival wnl  Plan:   Delirium precautions  Monitor oxygen saturation  Maintain sleep wake cycle     CV:   Diagnosis: Bradycardia  Patient continues with episodes of bradycardia, clinically suspect due to mucous plugging causing hypoxia   Cardiac monitoring  Atropine prn  Diagnosis: s/p cardiac arrest with ROSC  Patient had episode of pulseless asystole without hypoxia, ROSC obtained after 2 rounds ACLS with clearance of secretions on 5/31  Echo 6/11 showing EF of 65%  Plan:  Continuous cardiac monitoring  Frequent suctioning  Diagnosis: HFpEF  Echo 6/11 showing EF of 65%  Echo in 8/23 showing EF 60%  Daily weights  Strict Is and Os     Pulm:  Diagnosis: Ventilator  associated multifocal pneumonia  Plan: Home Trilogy ventilator settings AC/VC /RR 18/Peep 6 + 6 L O2 into ventilator. Uses albuterol q6 and mucomyst q6  Xopenex and hypertonic saline nebs  Trach now upsized to 8 XLT on 6/19  Frequent suctioning  Chest PT  Intermittent episodes of hypoxia  ABG on admission showing elevated PCO2 at 54.2, normal PO2 at 102.5, bicarb elevated at 34.8  Status post bronchoscopy 6/12, 6/16  Improves with bagging  Diagnosis: History pulmonary embolism  Hospitalized for right lower lobe segmental PE in August 2023, on eliquis since discharge  Transitioned to therapeutic Lovenox 6/14 in preparation for trach upsizing; now back on Eliquis     GI:   PEG tube dislodgement  Overnight into 6/18, patient self-removed PEG tube; Sheppard placed to maintain tract patency  S/p replacement on 6/19 without issue     :   No acute issues  Limited urine output data, external urinary catheter does not fit properly  Limited urine output, continue to monitor  BUN and creatinine within normal limits, EGFR appears normal  Continue to monitor urine output     F/E/N:   F: none  E: monitor and replete as needed  N:  Tube feeds resumed and at goal; consider trial of PO diet today     Heme/Onc:   DVT prophylaxis: Eliquis  Leukocytosis in the setting of multifocal pneumonia, now treated  Platelets stable  Hgb stable, no active signs of bleeding  Continue to monitor CBC     Endo:   No acute problems  TSH and am cortisol wnl     ID:   Diagnosis: Ventilator associated multifocal pneumonia  Plan:   Pseudomonas and serretia grown from bronchial washings  Patient status post 10 days antibiotics for treatment of ventilator associated pneumonia  No further plan for antibiotic treatment at this time  Chest x-ray 6/12 appears improved      Disposition: Critical care, possible d/c today  Patient is wheelchair/bedbound and is at risk of harm without a second vent due to the patient not being able to transfer the vent from  the bedside to the wheelchair.     ICU Core Measures     Vented Patient  VAP Bundle  VAP bundle ordered     A: Assess, Prevent, and Manage Pain Has pain been assessed? Yes  Need for changes to pain regimen? No   B: Both Spontaneous Awakening Trials (SATs) and Spontaneous Breathing Trials (SBTs) Plan to perform spontaneous awakening trial today? N/A   Plan to perform spontaneous breathing trial today? Chronic vent   Obvious barriers to extubation? Yes   C: Choice of Sedation RASS Goal: 0 Alert and Calm  Need for changes to sedation or analgesia regimen? No   D: Delirium CAM-ICU: Negative   E: Early Mobility  Plan for early mobility? Yes   F: Family Engagement Plan for family engagement today? Yes       Review of Invasive Devices:      Central access plan: Chronic access      Prophylaxis:  VTE VTE covered by:  apixaban, Oral, 5 mg at 06/20/24 1807       Stress Ulcer  not ordered        Significant 24hr Events     24hr events: Yesterday, patient's NG tube was removed and tube feeds were resumed. He did also work with PT/OT and made some headway, though he continues to require significant assistance with activities. Otherwise, no acute events overnight. This morning, patient has no complaints.      Subjective     Review of Systems: See HPI for Review of Systems     Objective                            Vitals I/O      Most Recent Min/Max in 24hrs   Temp 99.1 °F (37.3 °C) Temp  Min: 98 °F (36.7 °C)  Max: 100.6 °F (38.1 °C)   Pulse 56 Pulse  Min: 56  Max: 83   Resp (!) 38 Resp  Min: 15  Max: 38   /77 BP  Min: 92/53  Max: 198/86   O2 Sat 96 % SpO2  Min: 91 %  Max: 100 %      Intake/Output Summary (Last 24 hours) at 6/21/2024 0714  Last data filed at 6/21/2024 0600  Gross per 24 hour   Intake 3568 ml   Output 800 ml   Net 2768 ml       Diet Enteral/Parenteral; Tube Feeding No Oral Diet; Jevity 1.5; Continuous; 70; 150; Every 4 hours    Invasive Monitoring           Physical Exam   Physical Exam  Skin:     General: Skin  is warm and dry.   HENT:      Head: Normocephalic and atraumatic.      Mouth/Throat:      Mouth: Mucous membranes are moist.   Neck:      Trachea: Tracheostomy present.   Cardiovascular:      Rate and Rhythm: Regular rhythm. Bradycardia present.   Musculoskeletal:      Right lower leg: No edema.      Left lower leg: No edema.   Abdominal: General: There is no distension.      Palpations: Abdomen is soft.      Tenderness: There is no abdominal tenderness.   Constitutional:       Appearance: He is ill-appearing.   Pulmonary:      Comments: coarse  Neurological:      Mental Status: He is alert.      Motor: Strength full and intact in all extremities.            Diagnostic Studies      EKG: No new  Imaging: I have personally reviewed pertinent reports.       Medications:  Scheduled PRN   apixaban, 5 mg, BID  chlorhexidine, 15 mL, Q12H ISAEL  DULoxetine, 30 mg, Daily  fentaNYL, 100 mcg, Once  guaiFENesin, 200 mg, Q6H  levalbuterol, 1.25 mg, Q6H  melatonin, 3 mg, HS  JANES ANTIFUNGAL, , BID  sodium chloride, 4 mL, Q6H      acetaminophen, 1,000 mg, Q6H PRN  albuterol, 2.5 mg, Q4H PRN  atropine, 0.5 mg, Once PRN  glycopyrrolate, 0.1 mg, Q4H PRN  ondansetron, 4 mg, Q6H PRN       Continuous          Labs:    CBC    Recent Labs     06/19/24  0941 06/20/24  0441   WBC 6.02 6.29   HGB 12.0 11.8*   HCT 37.1 37.9    255     BMP    Recent Labs     06/19/24  0941 06/20/24  0441   SODIUM 138 143   K 4.1 3.8   CL 98 99   CO2 36* 36*   AGAP 4 8   BUN 12 11   CREATININE 0.46* 0.53*   CALCIUM 9.0 9.3       Coags    No recent results     Additional Electrolytes  No recent results       Blood Gas    No recent results  No recent results LFTs  No recent results    Infectious  No recent results  Glucose  Recent Labs     06/19/24  0941 06/20/24  0441   GLUC 96 91               Nilson Smith MD

## 2024-06-21 NOTE — DISCHARGE SUMMARY
"  Discharge Summary - Hemanth Swanson 39 y.o. 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  Patient originally presented on 5/31 for decreased oxygen saturation, shortness of breath while at his PCP.  CT positive for multilobar pneumonia. Bedside bronch w/ trach change completed 6/1, cultures grew serretia and pseudomonas. Patient originally on ceftriaxone, broadened to cefepime and then transitioned to zosyn. On 5/31 patient telemetry showed asystole and code blue was called. After 2 rounds of ACLS ROSC was obtained, patient had episode of left gaze and tonic clonic movements following ROSC which resolved with ativan. Throughout hospital course patient had several other episodes of bradycardia with hypoxia and seizure like activity that resolved with ativan. Patient was seen by neurology at [place] and thought to have reactive seizure like activity in response to bradycardia and hypoxia rather than primary epilepsy. EEG completed at [place] normal. Patient was recommended to have MRI brain and 24 hour video EEG. \"    Procedures Performed: No orders of the defined types were placed in this encounter.      Summary of Hospital Course: As stated above, patient was originally transferred to [facility] from [facility] for video EEG in the setting of possible seizure-like activity.  This was performed during his stay and did not reveal any acute seizure-like pathology.  Neurology was following the patient during his " stay throughout his stay in the ICU, the patient underwent routine pulmonary hygiene but continued to have numerous episodes consisting of desaturation events with subsequent hypoxia and bradycardia though no further kirti cardiac arrests.  Originally, these events were attributed to mucous plugging and the patient underwent several bronchoscopies for the purpose of cleaning out his airways.  It should be noted that the patient underwent treatment for ventilator associated pneumonia (bronchial washings grew Pseudomonas and Serratia) during his stay for which he completed a 10-day course of antibiotic therapy.    Despite the above-mentioned efforts, the patient continued to have several episodes of desaturation/bradycardia events.  Ultimately, the decision was made to engage with ENT for consideration of trach upsizing (with the thought process that a larger caliber tracheostomy tube may be less prone to occlusion).  His tracheostomy was ultimately upsized to a Shiley 8; this was then exchanged for an 8 XLT the following day given concerns for air leak in the setting of possible tracheomalacia.     Throughout his admission, significant efforts were made to further evaluate the patient's uncharacterized neuromuscular disorder.  We did discuss the patient's case with our inpatient neurology team as well as the neuromuscular specialist that he is scheduled to see in July to determine if any testing could be performed at this juncture to expedite his remaining workup; to this end, heavy metal screening and acid maltase genetic testing has been collected and will need follow-up in the future.  We did discuss the possibility of performing an inpatient EMG specifically targeted to the patient's thoracic paraspinal musculature (as recommended by our neuromuscular specialist) though the patient's family ultimately declined for him to remain admitted for this and will attempt to pursue this in the outpatient setting.  Of note,  the patient was incidentally found to have significantly low testosterone, likely in the setting of his prior orchiectomy and chemotherapy.  We did discuss his case with both endocrinology and urology and he received intramuscular testosterone during his admission; the urologist who performed his orchiectomy, Dr. Michael, will be taking over this aspect of his care going forward.    During the course of his stay, the patient was noted to be significantly debilitated.  After numerous sessions with PT/OT, he was recommended to pursue postacute rehab.  Several discussions were had with the patient's family regarding this recommendation though they ultimately elected to take the patient home instead.  Given his ventilator dependency, hypoxia/bradycardia episodes, and overall clinical status, there was some consideration given towards an LTAC or other care facility; however, the patient's family remains perseverative on caring for him at home.  Ultimately though, the patient will benefit from a very close follow-up in the outpatient setting.  Our case management department did attempt to coordinate home services and has been in contact with the patient's liaisons regarding this going forward.    As above, on discharge, the patient will require extremely close follow-up with his PCP and various specialists.  We will attempt to have him scheduled for close follow-up at our [place] location as this is closest to where he resides.  He and his family will need to be evaluated by the neuromuscular disease physician (Dr. Dc) as previously scheduled in July at which time the workup started during this admission can be reviewed in detail and further testing can be performed if indicated.  The patient will also need to follow-up with urology for ongoing management of his low testosterone/hypogonadism and a referral has been placed to facilitate this. Prior to discharge, the patient's supplies were ordered and delivered to  his home. It should be noted that the patient is wheelchair/bedbound and is at risk of harm without a second vent due to the patient not being able to transfer the vent from the bedside to the wheelchair.     Myself, the rest of the MICU team, and case management staff also spoke directly with the patient's sister and other family members prior to discharge.  All of the above information was reviewed with him in great detail and they expressed an understanding of the importance of close physician follow-up going forward as well as an understanding of the plan.  We did discuss that the patient has had several vomiting episodes following oral feedings during this admission and that he may well benefit from relying on tube feedings as his main source of nutrition; should his family choose to pursue p.o. intake, we recommended small volume feedings with as many precautions as possible.  We also discussed that the patient is at significant risk for ongoing hypoxia/bradycardia events which could well result in additional episodes of cardiac arrest and potentially even death if not treated in a timely manner.  The patient's family states that they have supplies for bagging at home and expressed an understanding that they are to immediately call 911 should any such issues arise.  Despite explaining of the above risks, they again expressed a desire to take the patient home for further care.  They are aware to immediately contact the pulmonology office if they experience any issues with the patient's ventilator or tracheostomy.  All questions and concerns were answered prior to discharge.    Significant Findings, Care, Treatment and Services Provided: Multiple bronchoscopies, sputum cultures growing Pseudomonas/Serratia status post treatment with 10-day course of antibiotic therapy, significantly low testosterone for which patient received 1 IM dose of supplementation, MRI brain with stable mild cortical flair signal in the  hypothalamic mamillary body likely secondary to prior Warnicke's encephalopathy, with otherwise noted stable T2 hyperintensities that may represent precocious chronic microangiopathy; tracheostomy upsized and replaced several times (currently has 8 XLT in place)    Complications: none    Discharge Diagnosis: Acute on chronic hypoxemic respiratory failure, ventilator-dependent    Medical Problems       Resolved Problems  Date Reviewed: 6/6/2024            Resolved    * (Principal) Ventilator associated pneumonia (HCC) 6/22/2024     Resolved by  Manny Evans MD          Condition at Discharge: fair         Discharge instructions/Information to patient and family:   See after visit summary for information provided to patient and family.      Provisions for Follow-Up Care:  See after visit summary for information related to follow-up care and any pertinent home health orders.      PCP: Ela Douglas MD    Disposition: Home    Planned Readmission: No      Discharge Statement   I spent 45 minutes discharging the patient. This time was spent on the day of discharge. I had direct contact with the patient on the day of discharge. Additional documentation is required if more than 30 minutes were spent on discharge.     Discharge Medications:  See after visit summary for reconciled discharge medications provided to patient and family.

## 2024-06-21 NOTE — CASE MANAGEMENT
Case Management Discharge Planning Note    Patient name Hemanth Swanson  [facility] [id number]  : 1985 Date 2024       Current Admission Date: 6/10/2024  Current Admission Diagnosis:Status post tracheostomy (HCC)   Patient Active Problem List    Diagnosis Date Noted Date Diagnosed    Ventilator associated pneumonia (HCC) 2024     Cardiac arrest due to other underlying condition ([facility]) 2024     Seizure-like activity ([facility]) 2024     Bradycardia 2024     Ventilator dependent ([facility]) 2024     Neuromuscular disorder ([facility]) 2024     Obesity hypoventilation syndrome ([facility]) 10/24/2023     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     Psychophysiological insomnia 10/18/2023     Impaired physical mobility 2023     Status post tracheostomy ([facility]) 2023     S/P percutaneous endoscopic gastrostomy (PEG) tube placement ([facility]) 2023     Anxiety 2023     Chronic respiratory failure with hypoxia and hypercapnia ([facility]) 2023     Sepsis ([facility]) 2023     Acute pulmonary embolism without acute cor pulmonale ([facility]) 2023     Depression 2023     Neuromuscular weakness ([facility]) 2023     History of LVH (left ventricular hypertrophy) 2023     Pure hypertriglyceridemia 2023     Unilateral vestibular schwannoma ([facility]) 2023     Port-A-Cath in place 2023     Diplopia 2023     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HCC) 2023     Umbilical hernia without obstruction and without gangrene 12/10/2022     Tobacco use 12/10/2022     Retroperitoneal lymphadenopathy 12/10/2022       LOS (days): 11  Geometric Mean LOS (GMLOS) (days):   Days to GMLOS:     OBJECTIVE:  Risk of Unplanned Readmission Score: 33.47         Current admission status: Inpatient   Preferred Pharmacy:   [id number]  [place] - [address] [address]  Phone: [phone] Fax:  [phone]    Primary Care Provider: Ela Douglas MD    Primary Insurance: [facility]  Secondary Insurance:     DISCHARGE DETAILS:             Contacts  Patient Contacts: Dmitry Swanson (sister)  Relationship to Patient:: Family  Phone Number: [phone]  Reason/Outcome: Discharge Planning, Continuity of Care, Emergency Contact              Other Referral/Resources/Interventions Provided:  Referral Comments: Transportation arranged via [employer]. Notfied provider & pt's sister of transportation time.  [facility] notified him of transport time to arrange RT to meet pt at home.               Transport at Discharge : [facility]  Dispatcher Contacted: Yes  Number/Name of Dispatcher: [employer] [phone]  Transported by (Company and Unit #): [employer]  ETA of Transport (Date): 06/22/24  ETA of Transport (Time): 1500

## 2024-06-21 NOTE — PLAN OF CARE
Problem: Prexisting or High Potential for Compromised Skin Integrity  Goal: Skin integrity is maintained or improved  Description: INTERVENTIONS:  - Identify patients at risk for skin breakdown  - Assess and monitor skin integrity  - Assess and monitor nutrition and hydration status  - Monitor labs   - Assess for incontinence   - Turn and reposition patient  - Assist with mobility/ambulation  - Relieve pressure over bony prominences  - Avoid friction and shearing  - Provide appropriate hygiene as needed including keeping skin clean and dry  - Evaluate need for skin moisturizer/barrier cream  - Collaborate with interdisciplinary team   - Patient/family teaching  - Consider wound care consult   Outcome: Progressing     Problem: PAIN - ADULT  Goal: Verbalizes/displays adequate comfort level or baseline comfort level  Description: Interventions:  - Encourage patient to monitor pain and request assistance  - Assess pain using appropriate pain scale  - Administer analgesics based on type and severity of pain and evaluate response  - Implement non-pharmacological measures as appropriate and evaluate response  - Consider cultural and social influences on pain and pain management  - Notify physician/advanced practitioner if interventions unsuccessful or patient reports new pain  Outcome: Progressing     Problem: INFECTION - ADULT  Goal: Absence or prevention of progression during hospitalization  Description: INTERVENTIONS:  - Assess and monitor for signs and symptoms of infection  - Monitor lab/diagnostic results  - Monitor all insertion sites, i.e. indwelling lines, tubes, and drains  - Monitor endotracheal if appropriate and nasal secretions for changes in amount and color  - [place] appropriate cooling/warming therapies per order  - Administer medications as ordered  - Instruct and encourage patient and family to use good hand hygiene technique  - Identify and instruct in appropriate isolation precautions for  identified infection/condition  Outcome: Progressing  Goal: Absence of fever/infection during neutropenic period  Description: INTERVENTIONS:  - Monitor WBC    Outcome: Progressing     Problem: SAFETY ADULT  Goal: Patient will remain free of falls  Description: INTERVENTIONS:  - Educate patient/family on patient safety including physical limitations  - Instruct patient to call for assistance with activity   - Consult OT/PT to assist with strengthening/mobility   - Keep Call bell within reach  - Keep bed low and locked with side rails adjusted as appropriate  - Keep care items and personal belongings within reach  - Initiate and maintain comfort rounds  - Make Fall Risk Sign visible to staff  - Offer Toileting every 2 Hours, in advance of need  - Initiate/Maintain bed alarm  - Obtain necessary fall risk management equipment: bed alarm  - Apply yellow socks and bracelet for high fall risk patients  - Consider moving patient to room near nurses station  Outcome: Progressing  Goal: Maintain or return to baseline ADL function  Description: INTERVENTIONS:  -  Assess patient's ability to carry out ADLs; assess patient's baseline for ADL function and identify physical deficits which impact ability to perform ADLs (bathing, care of mouth/teeth, toileting, grooming, dressing, etc.)  - Assess/evaluate cause of self-care deficits   - Assess range of motion  - Assess patient's mobility; develop plan if impaired  - Assess patient's need for assistive devices and provide as appropriate  - Encourage maximum independence but intervene and supervise when necessary  - Involve family in performance of ADLs  - Assess for home care needs following discharge   - Consider OT consult to assist with ADL evaluation and planning for discharge  - Provide patient education as appropriate  Outcome: Progressing  Goal: Maintains/Returns to pre admission functional level  Description: INTERVENTIONS:  - Perform AM-PAC 6 Click Basic Mobility/ Daily  Activity assessment daily.  - Set and communicate daily mobility goal to care team and patient/family/caregiver.   - Collaborate with rehabilitation services on mobility goals if consulted  - Perform Range of Motion 4 times a day.  - Reposition patient every 2 hours.  - Dangle patient 0 times a day  - Stand patient 0 times a day  - Ambulate patient 0 times a day  - Out of bed to chair 00 times a day   - Out of bed for meals 0 times a day  - Out of bed for toileting  - Record patient progress and toleration of activity level   Outcome: Progressing     Problem: DISCHARGE PLANNING  Goal: Discharge to home or other facility with appropriate resources  Description: INTERVENTIONS:  - Identify barriers to discharge w/patient and caregiver  - Arrange for needed discharge resources and transportation as appropriate  - Identify discharge learning needs (meds, wound care, etc.)  - Arrange for interpretive services to assist at discharge as needed  - Refer to Case Management Department for coordinating discharge planning if the patient needs post-hospital services based on physician/advanced practitioner order or complex needs related to functional status, cognitive ability, or social support system  Outcome: Progressing     Problem: Knowledge Deficit  Goal: Patient/family/caregiver demonstrates understanding of disease process, treatment plan, medications, and discharge instructions  Description: Complete learning assessment and assess knowledge base.  Interventions:  - Provide teaching at level of understanding  - Provide teaching via preferred learning methods  Outcome: Progressing     Problem: Nutrition/Hydration-ADULT  Goal: Nutrient/Hydration intake appropriate for improving, restoring or maintaining nutritional needs  Description: Monitor and assess patient's nutrition/hydration status for malnutrition. Collaborate with interdisciplinary team and initiate plan and interventions as ordered.  Monitor patient's weight and  dietary intake as ordered or per policy. Utilize nutrition screening tool and intervene as necessary. Determine patient's food preferences and provide high-protein, high-caloric foods as appropriate.     INTERVENTIONS:  - Monitor oral intake, urinary output, labs, and treatment plans  - Assess nutrition and hydration status and recommend course of action  - Evaluate amount of meals eaten  - Assist patient with eating if necessary   - Allow adequate time for meals  - Recommend/ encourage appropriate diets, oral nutritional supplements, and vitamin/mineral supplements  - Order, calculate, and assess calorie counts as needed  - Recommend, monitor, and adjust tube feedings and TPN/PPN based on assessed needs  - Assess need for intravenous fluids  - Provide specific nutrition/hydration education as appropriate  - Include patient/family/caregiver in decisions related to nutrition  Outcome: Progressing     Problem: SAFETY,RESTRAINT: NV/NON-SELF DESTRUCTIVE BEHAVIOR  Goal: Remains free of harm/injury (restraint for non violent/non self-detsructive behavior)  Description: INTERVENTIONS:  - Instruct patient/family regarding restraint use   - Assess and monitor physiologic and psychological status   - Provide interventions and comfort measures to meet assessed patient needs   - Identify and implement measures to help patient regain control  - Assess readiness for release of restraint   Outcome: Progressing  Goal: Returns to optimal restraint-free functioning  Description: INTERVENTIONS:  - Assess the patient's behavior and symptoms that indicate continued need for restraint  - Identify and implement measures to help patient regain control  - Assess readiness for release of restraint   Outcome: Progressing

## 2024-06-21 NOTE — SPEECH THERAPY NOTE
Speech-Language Pathology Progress Note      Patient Name: Hemanth Swanson    Today's Date: 6/21/2024    Subjective:  Pt was awake and alert. He was sitting upright in bed.     Objective:  Pt was seen today for dysphagia therapy at request of medical team to re assess diet tolerance. He is currently NPO with PEG tube feeds. Pt was  on vent, trach has been changed to #8.0 cuffed xlt.  Oral care was completed with use of oral care kits. Textures offered today included puree and thin liquid via straw.  Swallow function:   Bolus retrieval and control were WFL.  Manipulation and AP transfer were complete. Pharyngeal swallow initiation was prompt. Hyolaryngeal excursion was adequate/min reduced. No s/s aspiration occurred during session today.    Assessment:  Pt was previously ordered puree diet with thin liquids however orders were removed d/t emesis. His swallowing again appears WFL for puree and thin. Pt does sometimes eat soft/solid foods at home but is agreeable to continuing puree for the time being.     Plan:  Resume puree and thin liquids. Continue ST follow up. Subsequent sessions to focus on maximizing PO intake safety.

## 2024-06-22 VITALS
OXYGEN SATURATION: 99 % | RESPIRATION RATE: 29 BRPM | HEIGHT: 76 IN | WEIGHT: 251.32 LBS | BODY MASS INDEX: 30.6 KG/M2 | HEART RATE: 91 BPM | SYSTOLIC BLOOD PRESSURE: 109 MMHG | DIASTOLIC BLOOD PRESSURE: 76 MMHG | TEMPERATURE: 98.5 F

## 2024-06-22 PROBLEM — J95.851 VENTILATOR ASSOCIATED PNEUMONIA (HCC): Status: RESOLVED | Noted: 2024-06-11 | Resolved: 2024-06-22

## 2024-06-22 LAB
ANION GAP SERPL CALCULATED.3IONS-SCNC: 9 MMOL/L (ref 4–13)
BASOPHILS # BLD AUTO: 0.05 THOUSANDS/ÂΜL (ref 0–0.1)
BASOPHILS NFR BLD AUTO: 1 % (ref 0–1)
BUN SERPL-MCNC: 15 MG/DL (ref 5–25)
CALCIUM SERPL-MCNC: 9.7 MG/DL (ref 8.4–10.2)
CHLORIDE SERPL-SCNC: 101 MMOL/L (ref 96–108)
CO2 SERPL-SCNC: 33 MMOL/L (ref 21–32)
CREAT SERPL-MCNC: 0.53 MG/DL (ref 0.6–1.3)
EOSINOPHIL # BLD AUTO: 0.07 THOUSAND/ÂΜL (ref 0–0.61)
EOSINOPHIL NFR BLD AUTO: 1 % (ref 0–6)
ERYTHROCYTE [DISTWIDTH] IN BLOOD BY AUTOMATED COUNT: 16.2 % (ref 11.6–15.1)
GFR SERPL CREATININE-BSD FRML MDRD: 135 ML/MIN/1.73SQ M
GLUCOSE SERPL-MCNC: 100 MG/DL (ref 65–140)
GLUCOSE SERPL-MCNC: 78 MG/DL (ref 65–140)
GLUCOSE SERPL-MCNC: 94 MG/DL (ref 65–140)
HCT VFR BLD AUTO: 39.7 % (ref 36.5–49.3)
HGB BLD-MCNC: 12.4 G/DL (ref 12–17)
IMM GRANULOCYTES # BLD AUTO: 0.07 THOUSAND/UL (ref 0–0.2)
IMM GRANULOCYTES NFR BLD AUTO: 1 % (ref 0–2)
LYMPHOCYTES # BLD AUTO: 1.82 THOUSANDS/ÂΜL (ref 0.6–4.47)
LYMPHOCYTES NFR BLD AUTO: 20 % (ref 14–44)
MCH RBC QN AUTO: 30.2 PG (ref 26.8–34.3)
MCHC RBC AUTO-ENTMCNC: 31.2 G/DL (ref 31.4–37.4)
MCV RBC AUTO: 97 FL (ref 82–98)
MONOCYTES # BLD AUTO: 0.67 THOUSAND/ÂΜL (ref 0.17–1.22)
MONOCYTES NFR BLD AUTO: 7 % (ref 4–12)
NEUTROPHILS # BLD AUTO: 6.5 THOUSANDS/ÂΜL (ref 1.85–7.62)
NEUTS SEG NFR BLD AUTO: 70 % (ref 43–75)
NRBC BLD AUTO-RTO: 0 /100 WBCS
PLATELET # BLD AUTO: 268 THOUSANDS/UL (ref 149–390)
PMV BLD AUTO: 10 FL (ref 8.9–12.7)
POTASSIUM SERPL-SCNC: 4.2 MMOL/L (ref 3.5–5.3)
RBC # BLD AUTO: 4.11 MILLION/UL (ref 3.88–5.62)
SODIUM SERPL-SCNC: 143 MMOL/L (ref 135–147)
WBC # BLD AUTO: 9.18 THOUSAND/UL (ref 4.31–10.16)

## 2024-06-22 PROCEDURE — 80048 BASIC METABOLIC PNL TOTAL CA: CPT

## 2024-06-22 PROCEDURE — 94640 AIRWAY INHALATION TREATMENT: CPT

## 2024-06-22 PROCEDURE — 82948 REAGENT STRIP/BLOOD GLUCOSE: CPT

## 2024-06-22 PROCEDURE — 85025 COMPLETE CBC W/AUTO DIFF WBC: CPT

## 2024-06-22 PROCEDURE — 94760 N-INVAS EAR/PLS OXIMETRY 1: CPT

## 2024-06-22 PROCEDURE — 99239 HOSP IP/OBS DSCHRG MGMT >30: CPT | Performed by: ANESTHESIOLOGY

## 2024-06-22 PROCEDURE — NC001 PR NO CHARGE: Performed by: ANESTHESIOLOGY

## 2024-06-22 PROCEDURE — 94664 DEMO&/EVAL PT USE INHALER: CPT

## 2024-06-22 PROCEDURE — 94003 VENT MGMT INPAT SUBQ DAY: CPT

## 2024-06-22 PROCEDURE — 94669 MECHANICAL CHEST WALL OSCILL: CPT

## 2024-06-22 RX ORDER — DULOXETIN HYDROCHLORIDE 30 MG/1
30 CAPSULE, DELAYED RELEASE ORAL DAILY
Qty: 30 CAPSULE | Refills: 0 | Status: SHIPPED | OUTPATIENT
Start: 2024-06-23

## 2024-06-22 RX ADMIN — ACETAMINOPHEN 1000 MG: 10 INJECTION INTRAVENOUS at 01:35

## 2024-06-22 RX ADMIN — CHLORHEXIDINE GLUCONATE 0.12% ORAL RINSE 15 ML: 1.2 LIQUID ORAL at 09:31

## 2024-06-22 RX ADMIN — LEVALBUTEROL HYDROCHLORIDE 1.25 MG: 1.25 SOLUTION RESPIRATORY (INHALATION) at 01:30

## 2024-06-22 RX ADMIN — SODIUM CHLORIDE SOLN NEBU 3% 4 ML: 3 NEBU SOLN at 07:43

## 2024-06-22 RX ADMIN — GUAIFENESIN 200 MG: 100 SOLUTION ORAL at 09:32

## 2024-06-22 RX ADMIN — LEVALBUTEROL HYDROCHLORIDE 1.25 MG: 1.25 SOLUTION RESPIRATORY (INHALATION) at 07:43

## 2024-06-22 RX ADMIN — DULOXETINE HYDROCHLORIDE 30 MG: 30 CAPSULE, DELAYED RELEASE ORAL at 09:32

## 2024-06-22 RX ADMIN — APIXABAN 5 MG: 5 TABLET, FILM COATED ORAL at 09:31

## 2024-06-22 RX ADMIN — GUAIFENESIN 200 MG: 100 SOLUTION ORAL at 03:00

## 2024-06-22 RX ADMIN — MICONAZOLE NITRATE: 20 CREAM TOPICAL at 09:32

## 2024-06-22 RX ADMIN — LEVALBUTEROL HYDROCHLORIDE 1.25 MG: 1.25 SOLUTION RESPIRATORY (INHALATION) at 13:32

## 2024-06-22 RX ADMIN — SODIUM CHLORIDE SOLN NEBU 3% 4 ML: 3 NEBU SOLN at 13:32

## 2024-06-22 RX ADMIN — SODIUM CHLORIDE SOLN NEBU 3% 4 ML: 3 NEBU SOLN at 01:29

## 2024-06-22 RX ADMIN — ONDANSETRON 4 MG: 2 INJECTION INTRAMUSCULAR; INTRAVENOUS at 12:41

## 2024-06-22 NOTE — RESPIRATORY THERAPY NOTE
RT Ventilator Management Note  Hemanth Swanson 39 y.o. male MRN: [id number]  [facility]: [id number]      Daily Screen         6/21/2024  0752 6/22/2024  0744          Patient safety screen outcome:: Passed Passed      Not Ready for Weaning due to:: Underline problem not resolved Underline problem not resolved                Physical Exam:   Assessment Type: During-treatment  General Appearance: Alert, Awake  Respiratory Pattern: Assisted  Chest Assessment: Chest expansion symmetrical  Bilateral Breath Sounds: Coarse, Diminished  O2 Device:       Resp Comments: pt remains on current settings

## 2024-06-22 NOTE — RESPIRATORY THERAPY NOTE
Resp vent note   06/21/24 2349   Respiratory Assessment   Assessment Type Assess only   General Appearance Sleeping   Respiratory Pattern Assisted   Chest Assessment Chest expansion symmetrical   Bilateral Breath Sounds Coarse;Diminished   Resp Comments Received pt on documented vent settings, no changes made, pt tolerating well, will continue to monitor per rcp.   O2 Device    Vent Information   Vent ID [id number]   Vent type     Vent Mode AC/VC   $ Pulse Oximetry Spot Check Charge Completed   SpO2 96 %   AC/VC Settings   Resp Rate (BPM) 12 BPM   Vt (mL) 500 mL   FIO2 (%) 40 %   PEEP (cmH2O) 8 cmH2O   Flow Pattern (LPM) 60 L/min   Trigger Sensitivity Flow (lpm) 3 %   Humidification Heater   Heater Temperature (Set) 98.6 °F (37 °C)   AC/VC Actuals   Resp Rate (BPM) 12 BPM   VT (mL) 322   MV 4.55   MAP (cmH2O) 11 cmH2O   Peak Pressure (cmH2O) 21 cmH2O   I/E Ratio (Obs) 1:4.6   Heater Temperature (Obs) 98.6 °F (37 °C)   AC/VC Alarms   High Peak Pressure (cmH2O) 40   High Resp Rate (BPM) 35 BPM   High MV (L/min) 20 L/min   Low MV (L/min) 4 L/min   Vt High (mL) 1000 mL   Vt Low (mL) 300 mL   AC/VC Apnea Settings   Resp Rate (BPM) 12 BPM   VT (mL) 500 mL   FIO2 (%) 40 %   Apnea Time (s) 20 S   Apnea Flow (L/min) 60 L/min   Maintenance   Alarm (pink) cable attached No   Resuscitation bag with peep valve at bedside Yes   Water bag changed No   Circuit changed No   IHI Ventilator Associated Pneumonia Bundle   Head of Bed Elevated HOB 30   Surgical Airway Distal;Cuffed;Other (Comment)   Placement Date/Time: 06/01/24 1155   Tube Size: 6  Placed By: Physician  Placed by (Name): Dr. Ronquillo  Type: Tracheostomy  Style: (c) Distal;Cuffed;Other (Comment)   Status Cuff Inflated;Secured   Ties Assessment Clean;Dry;Intact   Surgical Airway Cuff Pressure (color) Green   Equipment at bedside BVM;Wall Suction setup;Additional complete same size trach tube;Additional complete one size smaller trach tube;Obturator;Sterile  saline;Additional inner cannula

## 2024-06-22 NOTE — NURSING NOTE
Discharge to home via stretcher with SLETS on the portable vent, in stable condition, in no acute distress. Extra inner cannula and # 7 XLT trach, ambu bag given to transport. Pt's belongings ( cell phone// home vent/) with pt on discharge. Pt's clothing refused by transport to take with patient.

## 2024-06-22 NOTE — DISCHARGE INSTR - AVS FIRST PAGE
-New trach supplies, a Wallace lift, and tube feeding supplies have been delivered for you  -We recommend that you try and rely on the tube feeds as much as possible given the vomiting with oral feeding we observed during your time here  -Please be sure to follow up with Dr. Dc from Neurology regarding further workup of your neuromuscular disease; we have started some additional workup for this that he can review at your visit  -You will need close follow-up with Pulmonology, ideally within 1-2 weeks; we will message our office staff to set up this appointment for you  -We have also noted that your testosterone was very low; you did receive a dose of supplemental testosterone during your stay, but you will need to follow-up with Urology (Dr. Michael); we will message his office regarding further injections  -Although you could eat a pureed diet, we have noticed significant vomiting with oral diet; we recommend leaning on tube feeds as much as possible, or at lest being extra careful with oral feedings  -There is a very real chance that further desaturation/bradycardia events may occur either during transport home or at home itself, at which point you would be at risk for another cardiac arrest; if there is any indication that this might occur, please call 911 immediately  -If you experience any issues with the ventilator, please contact the Pulmonology office immediately

## 2024-06-22 NOTE — PROGRESS NOTES
[facility]  Progress Note: Critical Care  Name: Hemanth Swanson 39 y.o. 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  Testicular seminoma, s/p ochiectomy/chemotherapy  Tracheostomy and peg dependence  History of pulmonary embolism on Eliquis  Possible CROW     Neuro:   Diagnosis: Seizure-like activity  EEG unremarkable, Keppra discontinued   Brain MRI: no acute intracranial pathology  Neurology following, appreciate recommendations  Diagnosis: Neuromuscular disorder  Patient has completed both outpatient and inpatient workup without any definitive diagnosis  Possible seronegative myasthenia variation due to chemo vs anoxic brain injury  Neuro willing to pursue inpatient EMG though unable to complete until next week; offered to family, though they declined and will pursue OP workup  Neurology following, appreciate recommendations; now signed off  Diagnosis: Altered mental status   In the setting of hypoxia and bradycardia, patient otherwise alert and oriented; lactic and ammonia obtained on arrival wnl  Plan:   Delirium precautions  Monitor oxygen saturation  Maintain sleep wake cycle     CV:   Diagnosis: Bradycardia  Patient continues with episodes of bradycardia, clinically suspect due to mucous plugging causing hypoxia   Cardiac monitoring  Atropine prn  Diagnosis: s/p cardiac arrest with ROSC  Patient had episode of pulseless asystole without hypoxia, ROSC obtained after 2 rounds ACLS with clearance of secretions on 5/31  Echo 6/11 showing EF of 65%  Plan:  Continuous cardiac monitoring  Frequent suctioning  Diagnosis: HFpEF  Echo 6/11 showing EF of 65%  Echo in 8/23 showing  EF 60%  Daily weights  Strict Is and Os     Pulm:  Diagnosis: Ventilator associated multifocal pneumonia  Plan: Home Trilogy ventilator settings AC/VC /RR 18/Peep 6 + 6 L O2 into ventilator. Uses albuterol q6 and mucomyst q6  Xopenex and hypertonic saline nebs  Trach now upsized to 8 XLT on 6/19  Frequent suctioning  Chest PT  Intermittent episodes of hypoxia  ABG on admission showing elevated PCO2 at 54.2, normal PO2 at 102.5, bicarb elevated at 34.8  Status post bronchoscopy 6/12, 6/16  Improves with bagging  Diagnosis: History pulmonary embolism  Hospitalized for right lower lobe segmental PE in August 2023, on eliquis since discharge  Transitioned to therapeutic Lovenox 6/14 in preparation for trach upsizing; now back on Eliquis     GI:   PEG tube dislodgement  Overnight into 6/18, patient self-removed PEG tube; [place] placed to maintain tract patency  S/p replacement on 6/19 without issue     :   No acute issues  Limited urine output data, external urinary catheter does not fit properly  Limited urine output, continue to monitor  BUN and creatinine within normal limits, EGFR appears normal  Continue to monitor urine output     F/E/N:   F: none  E: monitor and replete as needed  N: PO diet resumed per Speech recs     Heme/Onc:   DVT prophylaxis: Eliquis  Leukocytosis in the setting of multifocal pneumonia, now treated  Platelets stable  Hgb stable, no active signs of bleeding  Continue to monitor CBC     Endo:   Low testosterone  Both free and total testosterone noted to be low in setting of prior orchiectomy  Received one dose IM testosterone during admission  Discussed with Endo/Urology; to be followed up outpatient by Dr. Michael     ID:   Diagnosis: Ventilator associated multifocal pneumonia  Plan:   Pseudomonas and serretia grown from bronchial washings  Patient status post 10 days antibiotics for treatment of ventilator associated pneumonia  No further plan for antibiotic treatment at this  time  Chest x-ray 6/12 appears improved    Disposition: Critical care; anticipate discharge today    ICU Core Measures     Vented Patient  VAP Bundle  VAP bundle ordered     A: Assess, Prevent, and Manage Pain Has pain been assessed? Yes  Need for changes to pain regimen? No   B: Both Spontaneous Awakening Trials (SATs) and Spontaneous Breathing Trials (SBTs) Plan to perform spontaneous awakening trial today? N/A   Plan to perform spontaneous breathing trial today? Chronic vent   Obvious barriers to extubation? Yes   C: Choice of Sedation RASS Goal: 0 Alert and Calm  Need for changes to sedation or analgesia regimen? NA   D: Delirium CAM-ICU: Negative   E: Early Mobility  Plan for early mobility? Yes   F: Family Engagement Plan for family engagement today? Yes       Review of Invasive Devices:      Central access plan: chronic access      Prophylaxis:  VTE VTE covered by:  apixaban, Oral, 5 mg at 06/21/24 1701       Stress Ulcer  not ordered        Significant 24hr Events     24hr events: Yesterday, patient did have an episode where he appeared to be auto-PEEPing while on the vent; he required intermittent bagging during this episode, which ultimately improved following bagging. He continues to have intermittent shaking spells that have already been evaluated this admission and found to be inconsistent with seizures. This morning, patient is without acute complaints today.      Subjective     Review of Systems: See HPI for Review of Systems     Objective                            Vitals I/O      Most Recent Min/Max in 24hrs   Temp 99.1 °F (37.3 °C) Temp  Min: 98.6 °F (37 °C)  Max: 100 °F (37.8 °C)   Pulse 80 Pulse  Min: 56  Max: 94   Resp 17 Resp  Min: 12  Max: 38   /85 BP  Min: 95/50  Max: 151/85   O2 Sat 100 % SpO2  Min: 90 %  Max: 100 %      Intake/Output Summary (Last 24 hours) at 6/22/2024 0537  Last data filed at 6/22/2024 0302  Gross per 24 hour   Intake 1255 ml   Output 800 ml   Net 455 ml       Diet  Dysphagia/Modified Consistency; Dysphagia 1-Pureed; Thin Liquid    Invasive Monitoring           Physical Exam   Physical Exam  Vitals reviewed.   Eyes:      Extraocular Movements: Extraocular movements intact.      Conjunctiva/sclera: Conjunctivae normal.      Pupils: Pupils are equal, round, and reactive to light.   Skin:     General: Skin is warm and dry.   HENT:      Head: Normocephalic and atraumatic.      Nose: No congestion or rhinorrhea.      Mouth/Throat:      Mouth: Mucous membranes are moist.   Neck:      Trachea: Tracheostomy present.   Cardiovascular:      Rate and Rhythm: Normal rate and regular rhythm.   Musculoskeletal:         General: No swelling or tenderness.      Right lower leg: No edema.      Left lower leg: No edema.   Abdominal: General: Bowel sounds are normal. There is abdominal type feeding tube.There is no distension.      Palpations: Abdomen is soft.      Tenderness: There is no abdominal tenderness.      Hernia: A hernia is present.   Constitutional:       General: He is not in acute distress.     Appearance: He is not ill-appearing.      Interventions: He is restrained.   Pulmonary:      Effort: Pulmonary effort is normal. No respiratory distress.      Comments: Coarse, mechanical breath sounds  Neurological:      General: No focal deficit present.      Mental Status: He is alert. He is calm.      Comments: Communicates with head gestures            Diagnostic Studies      EKG: No new  Imaging: I have personally reviewed pertinent reports.       Medications:  Scheduled PRN   apixaban, 5 mg, BID  chlorhexidine, 15 mL, Q12H ISAEL  DULoxetine, 30 mg, Daily  fentaNYL, 100 mcg, Once  guaiFENesin, 200 mg, Q6H  levalbuterol, 1.25 mg, Q6H  melatonin, 3 mg, HS  JANES ANTIFUNGAL, , BID  sodium chloride, 4 mL, Q6H      acetaminophen, 1,000 mg, Q6H PRN  albuterol, 2.5 mg, Q4H PRN  atropine, 0.5 mg, Once PRN  glycopyrrolate, 0.1 mg, Q4H PRN  ondansetron, 4 mg, Q6H PRN       Continuous           Labs:    CBC    Recent Labs     06/22/24  0501   WBC 9.18   HGB 12.4   HCT 39.7        BMP    No recent results    Coags    No recent results     Additional Electrolytes  No recent results       Blood Gas    No recent results  No recent results LFTs  No recent results    Infectious  No recent results  Glucose  No recent results            Manny Evans MD

## 2024-06-22 NOTE — PLAN OF CARE
Problem: Prexisting or High Potential for Compromised Skin Integrity  Goal: Skin integrity is maintained or improved  Description: INTERVENTIONS:  - Identify patients at risk for skin breakdown  - Assess and monitor skin integrity  - Assess and monitor nutrition and hydration status  - Monitor labs   - Assess for incontinence   - Turn and reposition patient  - Assist with mobility/ambulation  - Relieve pressure over bony prominences  - Avoid friction and shearing  - Provide appropriate hygiene as needed including keeping skin clean and dry  - Evaluate need for skin moisturizer/barrier cream  - Collaborate with interdisciplinary team   - Patient/family teaching  - Consider wound care consult   Outcome: Progressing     Problem: PAIN - ADULT  Goal: Verbalizes/displays adequate comfort level or baseline comfort level  Description: Interventions:  - Encourage patient to monitor pain and request assistance  - Assess pain using appropriate pain scale  - Administer analgesics based on type and severity of pain and evaluate response  - Implement non-pharmacological measures as appropriate and evaluate response  - Consider cultural and social influences on pain and pain management  - Notify physician/advanced practitioner if interventions unsuccessful or patient reports new pain  Outcome: Progressing     Problem: INFECTION - ADULT  Goal: Absence or prevention of progression during hospitalization  Description: INTERVENTIONS:  - Assess and monitor for signs and symptoms of infection  - Monitor lab/diagnostic results  - Monitor all insertion sites, i.e. indwelling lines, tubes, and drains  - Monitor endotracheal if appropriate and nasal secretions for changes in amount and color  - [place] appropriate cooling/warming therapies per order  - Administer medications as ordered  - Instruct and encourage patient and family to use good hand hygiene technique  - Identify and instruct in appropriate isolation precautions for  identified infection/condition  Outcome: Progressing  Goal: Absence of fever/infection during neutropenic period  Description: INTERVENTIONS:  - Monitor WBC    Outcome: Progressing     Problem: SAFETY ADULT  Goal: Patient will remain free of falls  Description: INTERVENTIONS:  - Educate patient/family on patient safety including physical limitations  - Instruct patient to call for assistance with activity   - Consult OT/PT to assist with strengthening/mobility   - Keep Call bell within reach  - Keep bed low and locked with side rails adjusted as appropriate  - Keep care items and personal belongings within reach  - Initiate and maintain comfort rounds  - Make Fall Risk Sign visible to staff  - Apply yellow socks and bracelet for high fall risk patients  - Consider moving patient to room near nurses station  Outcome: Progressing  Goal: Maintain or return to baseline ADL function  Description: INTERVENTIONS:  -  Assess patient's ability to carry out ADLs; assess patient's baseline for ADL function and identify physical deficits which impact ability to perform ADLs (bathing, care of mouth/teeth, toileting, grooming, dressing, etc.)  - Assess/evaluate cause of self-care deficits   - Assess range of motion  - Assess patient's mobility; develop plan if impaired  - Assess patient's need for assistive devices and provide as appropriate  - Encourage maximum independence but intervene and supervise when necessary  - Involve family in performance of ADLs  - Assess for home care needs following discharge   - Consider OT consult to assist with ADL evaluation and planning for discharge  - Provide patient education as appropriate  Outcome: Progressing  Goal: Maintains/Returns to pre admission functional level  Description: INTERVENTIONS:  - Perform AM-PAC 6 Click Basic Mobility/ Daily Activity assessment daily.  - Set and communicate daily mobility goal to care team and patient/family/caregiver.   - Collaborate with rehabilitation  services on mobility goals if consulted  - Record patient progress and toleration of activity level   Outcome: Progressing     Problem: DISCHARGE PLANNING  Goal: Discharge to home or other facility with appropriate resources  Description: INTERVENTIONS:  - Identify barriers to discharge w/patient and caregiver  - Arrange for needed discharge resources and transportation as appropriate  - Identify discharge learning needs (meds, wound care, etc.)  - Arrange for interpretive services to assist at discharge as needed  - Refer to Case Management Department for coordinating discharge planning if the patient needs post-hospital services based on physician/advanced practitioner order or complex needs related to functional status, cognitive ability, or social support system  Outcome: Progressing     Problem: Knowledge Deficit  Goal: Patient/family/caregiver demonstrates understanding of disease process, treatment plan, medications, and discharge instructions  Description: Complete learning assessment and assess knowledge base.  Interventions:  - Provide teaching at level of understanding  - Provide teaching via preferred learning methods  Outcome: Progressing     Problem: Nutrition/Hydration-ADULT  Goal: Nutrient/Hydration intake appropriate for improving, restoring or maintaining nutritional needs  Description: Monitor and assess patient's nutrition/hydration status for malnutrition. Collaborate with interdisciplinary team and initiate plan and interventions as ordered.  Monitor patient's weight and dietary intake as ordered or per policy. Utilize nutrition screening tool and intervene as necessary. Determine patient's food preferences and provide high-protein, high-caloric foods as appropriate.     INTERVENTIONS:  - Monitor oral intake, urinary output, labs, and treatment plans  - Assess nutrition and hydration status and recommend course of action  - Evaluate amount of meals eaten  - Assist patient with eating if  necessary   - Allow adequate time for meals  - Recommend/ encourage appropriate diets, oral nutritional supplements, and vitamin/mineral supplements  - Order, calculate, and assess calorie counts as needed  - Recommend, monitor, and adjust tube feedings and TPN/PPN based on assessed needs  - Assess need for intravenous fluids  - Provide specific nutrition/hydration education as appropriate  - Include patient/family/caregiver in decisions related to nutrition  Outcome: Progressing     Problem: SAFETY,RESTRAINT: NV/NON-SELF DESTRUCTIVE BEHAVIOR  Goal: Remains free of harm/injury (restraint for non violent/non self-detsructive behavior)  Description: INTERVENTIONS:  - Instruct patient/family regarding restraint use   - Assess and monitor physiologic and psychological status   - Provide interventions and comfort measures to meet assessed patient needs   - Identify and implement measures to help patient regain control  - Assess readiness for release of restraint   Outcome: Progressing  Goal: Returns to optimal restraint-free functioning  Description: INTERVENTIONS:  - Assess the patient's behavior and symptoms that indicate continued need for restraint  - Identify and implement measures to help patient regain control  - Assess readiness for release of restraint   Outcome: Progressing

## 2024-06-22 NOTE — PLAN OF CARE
Problem: Prexisting or High Potential for Compromised Skin Integrity  Goal: Skin integrity is maintained or improved  Description: INTERVENTIONS:  - Identify patients at risk for skin breakdown  - Assess and monitor skin integrity  - Assess and monitor nutrition and hydration status  - Monitor labs   - Assess for incontinence   - Turn and reposition patient  - Assist with mobility/ambulation  - Relieve pressure over bony prominences  - Avoid friction and shearing  - Provide appropriate hygiene as needed including keeping skin clean and dry  - Evaluate need for skin moisturizer/barrier cream  - Collaborate with interdisciplinary team   - Patient/family teaching  - Consider wound care consult   6/22/2024 0844 by Maria G Chicas, JEAN-PAUL  Outcome: Progressing  6/22/2024 0844 by Maria G Chicas RN  Outcome: Progressing     Problem: PAIN - ADULT  Goal: Verbalizes/displays adequate comfort level or baseline comfort level  Description: Interventions:  - Encourage patient to monitor pain and request assistance  - Assess pain using appropriate pain scale  - Administer analgesics based on type and severity of pain and evaluate response  - Implement non-pharmacological measures as appropriate and evaluate response  - Consider cultural and social influences on pain and pain management  - Notify physician/advanced practitioner if interventions unsuccessful or patient reports new pain  6/22/2024 0844 by Maria G Chicas RN  Outcome: Progressing  6/22/2024 0844 by Maria G Chicas RN  Outcome: Progressing     Problem: INFECTION - ADULT  Goal: Absence or prevention of progression during hospitalization  Description: INTERVENTIONS:  - Assess and monitor for signs and symptoms of infection  - Monitor lab/diagnostic results  - Monitor all insertion sites, i.e. indwelling lines, tubes, and drains  - Monitor endotracheal if appropriate and nasal secretions for changes in amount and color  - [place] appropriate  cooling/warming therapies per order  - Administer medications as ordered  - Instruct and encourage patient and family to use good hand hygiene technique  - Identify and instruct in appropriate isolation precautions for identified infection/condition  6/22/2024 0844 by Maria G Chicas RN  Outcome: Progressing  6/22/2024 0844 by Maria G Chicas RN  Outcome: Progressing     Problem: INFECTION - ADULT  Goal: Absence of fever/infection during neutropenic period  Description: INTERVENTIONS:  - Monitor WBC    6/22/2024 0844 by Maria G Chicas RN  Outcome: Progressing  6/22/2024 0844 by Maria G Chicas RN  Outcome: Progressing     Problem: SAFETY ADULT  Goal: Patient will remain free of falls  Description: INTERVENTIONS:  - Educate patient/family on patient safety including physical limitations  - Instruct patient to call for assistance with activity   - Consult OT/PT to assist with strengthening/mobility   - Keep Call bell within reach  - Keep bed low and locked with side rails adjusted as appropriate  - Keep care items and personal belongings within reach  - Initiate and maintain comfort rounds  - Make Fall Risk Sign visible to staff  - Offer Toileting every 2 Hours, in advance of need  - Initiate/Maintain bed alarm    - Apply yellow socks and bracelet for high fall risk patients  - Consider moving patient to room near nurses station  6/22/2024 0844 by Maira G Chicas RN  Outcome: Progressing  6/22/2024 0844 by Maria G Chicas RN  Outcome: Progressing     Problem: SAFETY ADULT  Goal: Maintain or return to baseline ADL function  Description: INTERVENTIONS:  -  Assess patient's ability to carry out ADLs; assess patient's baseline for ADL function and identify physical deficits which impact ability to perform ADLs (bathing, care of mouth/teeth, toileting, grooming, dressing, etc.)  - Assess/evaluate cause of self-care deficits   - Assess range of motion  - Assess patient's mobility;  develop plan if impaired  - Assess patient's need for assistive devices and provide as appropriate  - Encourage maximum independence but intervene and supervise when necessary  - Involve family in performance of ADLs  - Assess for home care needs following discharge   - Consider OT consult to assist with ADL evaluation and planning for discharge  - Provide patient education as appropriate  6/22/2024 0844 by Maria G Chicas RN  Outcome: Progressing  6/22/2024 0844 by Maria G Chicas RN  Outcome: Progressing     Problem: SAFETY ADULT  Goal: Maintain or return to baseline ADL function  Description: INTERVENTIONS:  -  Assess patient's ability to carry out ADLs; assess patient's baseline for ADL function and identify physical deficits which impact ability to perform ADLs (bathing, care of mouth/teeth, toileting, grooming, dressing, etc.)  - Assess/evaluate cause of self-care deficits   - Assess range of motion  - Assess patient's mobility; develop plan if impaired  - Assess patient's need for assistive devices and provide as appropriate  - Encourage maximum independence but intervene and supervise when necessary  - Involve family in performance of ADLs  - Assess for home care needs following discharge   - Consider OT consult to assist with ADL evaluation and planning for discharge  - Provide patient education as appropriate  6/22/2024 0844 by Maria G Chicas RN  Outcome: Progressing  6/22/2024 0844 by Maria G Chicas RN  Outcome: Progressing     Problem: SAFETY ADULT  Goal: Maintains/Returns to pre admission functional level  Description: INTERVENTIONS:  - Perform AM-PAC 6 Click Basic Mobility/ Daily Activity assessment daily.  - Set and communicate daily mobility goal to care team and patient/family/caregiver.   - Collaborate with rehabilitation services on mobility goals if consulted  - Perform Range of Motion 2   times a day.  - Reposition patient every 2 hours.  - Dangle patient 2 time  -  -  Out of bed for toileting  - Record patient progress and toleration of activity level   6/22/2024 0844 by Maria G Chicas RN  Outcome: Progressing  6/22/2024 0844 by Maria G Chicas RN  Outcome: Progressing     Problem: DISCHARGE PLANNING  Goal: Discharge to home or other facility with appropriate resources  Description: INTERVENTIONS:  - Identify barriers to discharge w/patient and caregiver  - Arrange for needed discharge resources and transportation as appropriate  - Identify discharge learning needs (meds, wound care, etc.)  - Arrange for interpretive services to assist at discharge as needed  - Refer to Case Management Department for coordinating discharge planning if the patient needs post-hospital services based on physician/advanced practitioner order or complex needs related to functional status, cognitive ability, or social support system  6/22/2024 0844 by Maria G Chicas RN  Outcome: Progressing  6/22/2024 0844 by Maria G Chicas RN  Outcome: Progressing     Problem: Nutrition/Hydration-ADULT  Goal: Nutrient/Hydration intake appropriate for improving, restoring or maintaining nutritional needs  Description: Monitor and assess patient's nutrition/hydration status for malnutrition. Collaborate with interdisciplinary team and initiate plan and interventions as ordered.  Monitor patient's weight and dietary intake as ordered or per policy. Utilize nutrition screening tool and intervene as necessary. Determine patient's food preferences and provide high-protein, high-caloric foods as appropriate.     INTERVENTIONS:  - Monitor oral intake, urinary output, labs, and treatment plans  - Assess nutrition and hydration status and recommend course of action  - Evaluate amount of meals eaten  - Assist patient with eating if necessary   - Allow adequate time for meals  - Recommend/ encourage appropriate diets, oral nutritional supplements, and vitamin/mineral supplements  - Order, calculate, and  assess calorie counts as needed  - Recommend, monitor, and adjust tube feedings and TPN/PPN based on assessed needs  - Assess need for intravenous fluids  - Provide specific nutrition/hydration education as appropriate  - Include patient/family/caregiver in decisions related to nutrition  6/22/2024 0844 by Maria G Chicas, JEAN-PAUL  Outcome: Progressing  6/22/2024 0844 by Maria G Chicas RN  Outcome: Progressing     Problem: NEUROSENSORY - ADULT  Goal: Achieves stable or improved neurological status  Description: INTERVENTIONS  - Monitor and report changes in neurological status  - Monitor vital signs such as temperature, blood pressure, glucose, and any other labs ordered   - Initiate measures to prevent increased intracranial pressure  - Monitor for seizure activity and implement precautions if appropriate      Outcome: Progressing     Problem: CARDIOVASCULAR - ADULT  Goal: Maintains optimal cardiac output and hemodynamic stability  Description: INTERVENTIONS:  - Monitor I/O, vital signs and rhythm  - Monitor for S/S and trends of decreased cardiac output  - Administer and titrate ordered vasoactive medications to optimize hemodynamic stability  - Assess quality of pulses, skin color and temperature  - Assess for signs of decreased coronary artery perfusion  - Instruct patient to report change in severity of symptoms  Outcome: Progressing     Problem: CARDIOVASCULAR - ADULT  Goal: Absence of cardiac dysrhythmias or at baseline rhythm  Description: INTERVENTIONS:  - Continuous cardiac monitoring, vital signs, obtain 12 lead EKG if ordered  - Administer antiarrhythmic and heart rate control medications as ordered  - Monitor electrolytes and administer replacement therapy as ordered  Outcome: Progressing     Problem: RESPIRATORY - ADULT  Goal: Achieves optimal ventilation and oxygenation  Description: INTERVENTIONS:  - Assess for changes in respiratory status  - Assess for changes in mentation and behavior  -  Position to facilitate oxygenation and minimize respiratory effort  - Oxygen administered by appropriate delivery if ordered  - Initiate smoking cessation education as indicated  - Encourage broncho-pulmonary hygiene including cough, deep breathe, Incentive Spirometry  - Assess the need for suctioning and aspirate as needed  - Assess and instruct to report SOB or any respiratory difficulty  - Respiratory Therapy support as indicated  Outcome: Progressing     Problem: GASTROINTESTINAL - ADULT  Goal: Minimal or absence of nausea and/or vomiting  Description: INTERVENTIONS:  - Administer IV fluids if ordered to ensure adequate hydration  - Maintain NPO status until nausea and vomiting are resolved  - Nasogastric tube if ordered  - Administer ordered antiemetic medications as needed  - Provide nonpharmacologic comfort measures as appropriate  - Advance diet as tolerated, if ordered  - Consider nutrition services referral to assist patient with adequate nutrition and appropriate food choices  Outcome: Progressing     Problem: METABOLIC, FLUID AND ELECTROLYTES - ADULT  Goal: Electrolytes maintained within normal limits  Description: INTERVENTIONS:  - Monitor labs and assess patient for signs and symptoms of electrolyte imbalances  - Administer electrolyte replacement as ordered  - Monitor response to electrolyte replacements, including repeat lab results as appropriate  - Instruct patient on fluid and nutrition as appropriate  Outcome: Progressing

## 2024-06-23 NOTE — QUICK NOTE
For clarification: patient originally presented to [facility] with complaints of hypoxia and shortness of breath. He met sepsis criteria (present on admission) and was ultimately originally admitted principally for sepsis in the setting of ventilator-associated, multifocal pneumonia. He was started on treatment for this prior to transfer to [facility] and completed a 10-day course of therapy following transfer.

## 2024-06-24 ENCOUNTER — TRANSITIONAL CARE MANAGEMENT (OUTPATIENT)
Dept: FAMILY MEDICINE CLINIC | Facility: CLINIC | Age: 39
End: 2024-06-24

## 2024-06-24 NOTE — UTILIZATION REVIEW
NOTIFICATION OF ADMISSION DISCHARGE   This is a Notification of Discharge from [facility]. Please be advised that this patient has been discharge from our facility. Below you will find the admission and discharge date and time including the patient’s disposition.   UTILIZATION REVIEW CONTACT:  Adrian Roy  Utilization   Network Utilization Review Department  Phone: [phone] x carefully listen to the prompts. All voicemails are confidential.  Email: [email or web address]     ADMISSION INFORMATION  PRESENTATION DATE: 6/10/2024  1:18 PM  OBERVATION ADMISSION DATE:   INPATIENT ADMISSION DATE: 6/10/24  1:18 PM   DISCHARGE DATE: 6/22/2024  4:21 PM   DISPOSITION:Home/Self Care    Network Utilization Review Department  ATTENTION: Please call with any questions or concerns to [phone] and carefully listen to the prompts so that you are directed to the right person. All voicemails are confidential.   For Discharge needs, contact Care Management DC Support Team at [phone] opt. 2  Send all requests for admission clinical reviews, approved or denied determinations and any other requests to dedicated fax number below belonging to the campus where the patient is receiving treatment. List of dedicated fax numbers for the Facilities:  FACILITY NAME UR FAX NUMBER   ADMISSION DENIALS (Administrative/Medical Necessity) [phone]   DISCHARGE SUPPORT TEAM ([facility]) [phone]   PARENT CHILD HEALTH (Maternity/NICU/Pediatrics) [phone]   [facility] [phone]   [facility] [phone]   [facility] [phone]   [facility] [phone]   [facility] [phone]   [facility] [phone]   [facility] [phone]   [facility] [phone]   [facility]  [facility] [place]) [phone]   [facility] [phone]   [facility] [phone]   [facility] [phone]

## 2024-06-25 ENCOUNTER — TELEPHONE (OUTPATIENT)
Age: 39
End: 2024-06-25

## 2024-06-25 NOTE — TELEPHONE ENCOUNTER
David called from [facility] called with a recommendation. Patient was discharged from the hospital on 6/22. Family was expecting a call from [facility] but it doesn't look as if an order was placed. Alfredoguilherme feels the patient would benefit with PT . LPN mentioned the office did call Dmitry, sister, and left a message for her to call regarding a TCM appt . Aware an appointment may be needed for [facility] to provide PT as well.

## 2024-06-26 ENCOUNTER — TELEPHONE (OUTPATIENT)
Dept: FAMILY MEDICINE CLINIC | Facility: CLINIC | Age: 39
End: 2024-06-26

## 2024-06-26 LAB
ARSENIC BLD-MCNC: 1 UG/L (ref 0–9)
CADMIUM BLD-MCNC: <0.5 UG/L (ref 0–1.2)
LEAD BLD-MCNC: <1 UG/DL (ref 0–3.4)
MERCURY BLD-MCNC: <1 UG/L (ref 0–14.9)

## 2024-06-26 NOTE — TELEPHONE ENCOUNTER
Called patient's sister Dmitry, left message for her to call the office back to complete patient's TCM call and advise her of this.

## 2024-06-26 NOTE — TELEPHONE ENCOUNTER
Patient's sister Dmitry called to schedule TCM for patient. Patient was admitted 6/10-6/20  Deborah. Patient scheduled for first available 7/11. Please complete TCM questions.

## 2024-06-27 NOTE — TELEPHONE ENCOUNTER
Call placed to patient's sister Dmitry. Per Dmitry, she spoke to a coordinator at [facility] and requested the orders for PT, OT and speech therapy. Dmitry advised that this will need to be discussed at patient's TCM appointment.

## 2024-06-27 NOTE — TELEPHONE ENCOUNTER
Pt's sister returned Polly's call. I tried to warm transfer to the office but there was no answer. Please, call Dmitry back for questions.

## 2024-06-28 ENCOUNTER — TELEPHONE (OUTPATIENT)
Age: 39
End: 2024-06-28

## 2024-06-28 NOTE — TELEPHONE ENCOUNTER
Zenaida from [facility] called trying to arrange transportation for this for appt that is on 7/10 at 1 pm.    Zenaida needed to confirm location as well as get a Fax #.    Advised Zenaida of:      [facility], [address], [address], [place], [place], [address] [phone]      Please call the department directly at [phone] for any questions.     Tried to call to warm transfer but call dropped. Provided phone #, Zenaida will try later to call.

## 2024-07-01 PROBLEM — A41.9 SEPSIS (HCC): Status: RESOLVED | Noted: 2023-08-24 | Resolved: 2024-07-01

## 2024-07-08 ENCOUNTER — OFFICE VISIT (OUTPATIENT)
Dept: PULMONOLOGY | Facility: CLINIC | Age: 39
End: 2024-07-08
Payer: COMMERCIAL

## 2024-07-08 VITALS — SYSTOLIC BLOOD PRESSURE: 150 MMHG | DIASTOLIC BLOOD PRESSURE: 70 MMHG | OXYGEN SATURATION: 99 % | HEART RATE: 91 BPM

## 2024-07-08 DIAGNOSIS — I26.99 OTHER ACUTE PULMONARY EMBOLISM WITHOUT ACUTE COR PULMONALE (HCC): ICD-10-CM

## 2024-07-08 DIAGNOSIS — Z99.11 VENTILATOR DEPENDENT (HCC): Primary | Chronic | ICD-10-CM

## 2024-07-08 DIAGNOSIS — Z93.0 STATUS POST TRACHEOSTOMY (HCC): Chronic | ICD-10-CM

## 2024-07-08 DIAGNOSIS — J96.12 CHRONIC RESPIRATORY FAILURE WITH HYPOXIA AND HYPERCAPNIA (HCC): ICD-10-CM

## 2024-07-08 DIAGNOSIS — G70.9 NEUROMUSCULAR DISORDER (HCC): Chronic | ICD-10-CM

## 2024-07-08 DIAGNOSIS — C62.92 SEMINOMA OF LEFT TESTIS (HCC): Chronic | ICD-10-CM

## 2024-07-08 DIAGNOSIS — J96.11 CHRONIC RESPIRATORY FAILURE WITH HYPOXIA AND HYPERCAPNIA (HCC): ICD-10-CM

## 2024-07-08 PROCEDURE — 99215 OFFICE O/P EST HI 40 MIN: CPT | Performed by: STUDENT IN AN ORGANIZED HEALTH CARE EDUCATION/TRAINING PROGRAM

## 2024-07-08 NOTE — PATIENT INSTRUCTIONS
It was a pleasure seeing you today!    Use albuterol and hypertonic saline every 8 hours  I will talk to respiratory therapy  Continue Eliquis for now  Refer to Urology   Follow up in 4 weeks     Yessy Barksdale MD  Pulmonary and Critical Care Medicine

## 2024-07-08 NOTE — PROGRESS NOTES
Pulmonary Outpatient Progress Note   Hemanth Swanson 39 y.o. male MRN: [id number]  7/8/2024      Assessment:    Ventilator dependent respiratory failure from an undiagnosed neurological condition. Now back to 8.0 distal XLT cuffed and plan to keep this for the time being.  He is currently on AC volume control, ventilator is about 550 tidal volume, 100% spontaneous triggers, respiratory rate 12. I spoke with Charley from [facility] his home RT. he states on AC most days, when he was on BiPAP he did not tolerate too well.  Plan will be to continue him on AC for the time being  S/p cardiac arrest with ROSC from mucus plugging, likely hypoxic mediated   Recent hospitalization for acute on chronic hypoxemic respiratory failure found to have pneumonia treated with IV antibiotics, several bronchoscopies, became bradycardic and was transferred to [facility] for continuous EEG was started on Keppra but then discontinued.  Ultimately there was no diagnosis found for his seizure-like activity.  Brain MRI was performed that was negative.  Neurology evaluated him and could not find a diagnosis.  After his tracheostomy was upsized to 8.0 he had less episodes of bradycardia, less tracheal secretions and clinically improved from there.  Acute intermediate low risk pulmonary embolism on Eliquis, he has completed his 6 months of therapy but as he does have periods of being immobile we both agreed to continue this medication for the time being  PEG tube, utilize for some nutrition currently on dysphagia diet.  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 Michael  I spoke to his home respiratory therapist, for now we will continue with AC 24/7.  When he tried BiPAP nightly he did not do well, he would have periods of apnea and would switch to backup mode.   Continue Eliquis for  the time being, family agrees  Continue albuterol can go to every 8 hours instead.  Uses hypertonic saline can use that every 8 hours as well  Continue with vest therapy and chest PT  Follow-up in 1 month with me  I have spent a total time of 42 minutes on 07/08/24 in caring for this patient including Diagnostic results, Prognosis, Risks and benefits of tx options, Instructions for management, Patient and family education, Importance of tx compliance, Impressions, Counseling / Coordination of care, Documenting in the medical record, Reviewing / ordering tests, medicine, procedures  , Obtaining or reviewing history  , and Communicating with other healthcare professionals .      History of Present Illness   HPI:    39-year-old gentleman here for follow-up.  He has a complicated past medical history, initially with an undiagnosed neuromuscular disorder admitted to [facility] had difficulty weaning off the ventilator even she had 8.0 Shiley tracheostomy.  He eventually followed up with me and we were able to get him down to capping trials but several days into his capping trials he aspirated and required mechanical support.  He aspirated and was sent to [facility] where they upsized him to 8.0 tracheostomy.  Since then he has had continuous ventilator requirements.    He followed up with me recently where he was going in the right direction and we were considering pulmonary rehab.  Afterwards he had a case of pneumonia and was hospitalized at [facility] where he had bradycardia followed by cardiac arrest that would last 1 or 2 minutes, he had myoclonic versus seizure activity eventually transferred to [facility] for video EEG, they upsize his tracheostomy to 8.0 distal XLT.  Eventually improved and was back to normal and discharged home.    He is now here for follow-up currently on a transport ventilator utilizing A/C volume, 100 send triggers, minute volume ventilation about 9 L.   He has a PEG tube, uses that for occasional nutrition but also uses dysphagia diet.  He still on Eliquis, uses albuterol every 4 hours along with hypertonic saline every 8 hours, also uses vest therapy.  He has home RT that comes over.      Review of Systems   Constitutional:  Positive for activity change, chills, fatigue and fever.   HENT:  Negative for trouble swallowing and voice change.    Eyes: Negative.    Respiratory:  Positive for cough. Negative for wheezing.    Cardiovascular: Negative.    Gastrointestinal: Negative.    Endocrine: Positive for cold intolerance and heat intolerance.   Genitourinary: Negative.    Musculoskeletal: Negative.    Skin: Negative.    Allergic/Immunologic: Positive for immunocompromised state.   Neurological: Negative.    Hematological: Negative.    Psychiatric/Behavioral: Negative.          Historical Information   Past Medical History:   Diagnosis Date   • Anxiety    • Cancer (HCC)    • Depression    • Migration of percutaneous endoscopic gastrostomy (PEG) tube ([facility]) 09/24/2023   • Psychiatric disorder     bipolar     Past Surgical History:   Procedure Laterality Date   • IR BIOPSY LYMPH NODE  01/20/2023   • IR GASTROSTOMY (G) TUBE CHECK/CHANGE/REINSERTION/UPSIZE  6/18/2024   • IR GASTROSTOMY TUBE PLACEMENT  09/25/2023   • IR LUMBAR PUNCTURE  09/06/2023   • IR PORT PLACEMENT  03/14/2023   • ORCHIECTOMY Left 12/14/2022    Procedure: ORCHIECTOMY INGUINAL;  Surgeon: Flip Michael MD;  Location: [facility] OR;  Service: Urology   • PEG W/TRACHEOSTOMY PLACEMENT N/A 09/08/2023    Procedure: TRACHEOSTOMY WITH INSERTION PEG TUBE;  Surgeon: Rudy Mcmanus MD;  Location: [facility] OR;  Service: General   • TESTICLE SURGERY       Family History   Problem Relation Age of Onset   • Coronary artery disease Maternal Aunt    • Cancer Father    • Diabetes Father    • Hypertension Father      Social History     Tobacco Use   Smoking Status Former   • Current packs/day: 0.00   • Average  packs/day: 0.5 packs/day for 15.4 years (7.7 ttl pk-yrs)   • Types: Cigarettes   • Start date: 2008   • Quit date: 2023   • Years since quittin.1   Smokeless Tobacco Never       Occupational History: disabled     Meds/Allergies     Current Outpatient Medications:   •  albuterol (2.5 mg/3 mL) 0.083 % nebulizer solution, TAKE 3 ML (2.5 MG TOTAL) BY NEBULIZATION 4 (FOUR) TIMES A DAY., Disp: , Rfl:   •  apixaban (ELIQUIS) 5 mg, 1 tablet (5 mg total) by Per PEG Tube route 2 (two) times a day, Disp: 60 tablet, Rfl: 2  •  DULoxetine (CYMBALTA) 30 mg delayed release capsule, Take 1 capsule (30 mg total) by mouth daily, Disp: 30 capsule, Rfl: 0  •  hydrOXYzine (ATARAX) 10 mg/5 mL syrup, , Disp: , Rfl:   •  Incontinence Supply Disposable (Comfort Shield Adult Diapers) MISC, Use 1 each 4 (four) times a day, Disp: 48 each, Rfl: 5  •  Oral Hygiene Products (Toothette Swabs/Dentifrice) SWAB, Apply to the mouth or throat 3 (three) times a day, Disp: 1000 each, Rfl: 1  •  oxygen gas, Inhale 6 L/min as needed, Disp: , Rfl:   •  polyethylene glycol (MIRALAX) 17 g packet, 17 g by Per PEG Tube route daily as needed (constipation), Disp: 10 each, Rfl: 0  •  Sennosides (senna) 8.8 mg/5 mL oral syrup, 10 mL (17.6 mg total) by Per PEG Tube route daily as needed (Constipation) (Patient not taking: Reported on 2024), Disp: 236 mL, Rfl: 0  •  sodium chloride (BRONCHO SALINE) inhaler solution, Take 1 spray by nebulization every 8 (eight) hours, Disp: , Rfl:   Allergies   Allergen Reactions   • Dog Epithelium Cough, Sneezing and Nasal Congestion       Vitals: Blood pressure 150/70, pulse 91, SpO2 99%., There is no height or weight on file to calculate BMI. Oxygen Therapy  SpO2: 99 %    Physical Exam:    GEN: alert and oriented x 3, pleasant and cooperative   HEENT:  Normocephalic, atraumatic, anicteric  NECK: No JVD, trach site clear   HEART: Rate, normal S1 and S2  LUNGS: Coarse rhonchi bilaterally  ABDOMEN:  Normoactive bowel  "sounds, soft, no tenderness, no distention  EXTREMITIES: peripheral pulses palpable; no edema  NEURO: no gross focal findings; cranial nerves grossly intact   SKIN:  Dry, intact, warm to touch    Labs: I have personally reviewed pertinent lab results.  No results found for: \"IGE\"    Imaging and other studies: I have personally reviewed pertinent films in PACS    Pulmonary function testing:  NA    Other Studies: I have personally reviewed pertinent reports.      Yessy Barksdale MD  Pulmonary and Critical Care Medicine     "

## 2024-07-09 ENCOUNTER — TELEPHONE (OUTPATIENT)
Dept: FAMILY MEDICINE CLINIC | Facility: CLINIC | Age: 39
End: 2024-07-09

## 2024-07-09 NOTE — TELEPHONE ENCOUNTER
Pt is scheduled for a TCM visit on Thursday.  He just had pulmonary visit today and has an EMG tomorrow.      It is hard for his family to get him out for visits given that he is vent dependent/trach.  Since he was just examined- ok to check w family to see if he prefers a virtual visit instead for his TCM.

## 2024-07-10 ENCOUNTER — HOSPITAL ENCOUNTER (OUTPATIENT)
Dept: NEUROLOGY | Facility: CLINIC | Age: 39
Discharge: HOME | End: 2024-07-10
Payer: COMMERCIAL

## 2024-07-10 DIAGNOSIS — Z74.09 IMPAIRED MOBILITY: ICD-10-CM

## 2024-07-10 DIAGNOSIS — G70.9 NEUROMUSCULAR WEAKNESS (HCC): ICD-10-CM

## 2024-07-10 DIAGNOSIS — R32 URINARY INCONTINENCE: ICD-10-CM

## 2024-07-10 DIAGNOSIS — R15.9 BOWEL INCONTINENCE: ICD-10-CM

## 2024-07-10 PROCEDURE — 95937 NEUROMUSCULAR JUNCTION TEST: CPT | Performed by: PSYCHIATRY & NEUROLOGY

## 2024-07-10 PROCEDURE — 95886 MUSC TEST DONE W/N TEST COMP: CPT | Performed by: PSYCHIATRY & NEUROLOGY

## 2024-07-10 PROCEDURE — 95911 NRV CNDJ TEST 9-10 STUDIES: CPT | Performed by: PSYCHIATRY & NEUROLOGY

## 2024-07-10 NOTE — TELEPHONE ENCOUNTER
Call placed to patient's sister Dmitry. Dmitry agreeable to virtual visit for patient.     FYI, appointment type cannot be changed.

## 2024-07-11 ENCOUNTER — TELEMEDICINE (OUTPATIENT)
Dept: FAMILY MEDICINE CLINIC | Facility: CLINIC | Age: 39
End: 2024-07-11
Payer: COMMERCIAL

## 2024-07-11 ENCOUNTER — TELEPHONE (OUTPATIENT)
Age: 39
End: 2024-07-11

## 2024-07-11 VITALS
SYSTOLIC BLOOD PRESSURE: 95 MMHG | DIASTOLIC BLOOD PRESSURE: 58 MMHG | OXYGEN SATURATION: 99 % | HEART RATE: 79 BPM | TEMPERATURE: 96.8 F

## 2024-07-11 DIAGNOSIS — Z74.09 IMPAIRED MOBILITY: Primary | ICD-10-CM

## 2024-07-11 DIAGNOSIS — G70.9 NEUROMUSCULAR WEAKNESS (HCC): ICD-10-CM

## 2024-07-11 DIAGNOSIS — F41.9 ANXIETY: Chronic | ICD-10-CM

## 2024-07-11 DIAGNOSIS — Z99.11 VENTILATOR DEPENDENT (HCC): Chronic | ICD-10-CM

## 2024-07-11 DIAGNOSIS — G70.9 NEUROMUSCULAR DISORDER (HCC): ICD-10-CM

## 2024-07-11 DIAGNOSIS — I46.8 CARDIAC ARREST DUE TO OTHER UNDERLYING CONDITION (HCC): ICD-10-CM

## 2024-07-11 PROCEDURE — 99214 OFFICE O/P EST MOD 30 MIN: CPT | Performed by: FAMILY MEDICINE

## 2024-07-11 RX ORDER — DULOXETINE 40 MG/1
40 CAPSULE, DELAYED RELEASE ORAL
Qty: 30 CAPSULE | Refills: 2 | Status: SHIPPED | OUTPATIENT
Start: 2024-07-11

## 2024-07-11 NOTE — ASSESSMENT & PLAN NOTE
Family and patient declined acute care rehab after hospitalization.  Sister would like pt to start home PT, which he had received in the past  Referral placed  Has f/u with neuro  EMG yesterday

## 2024-07-11 NOTE — TELEPHONE ENCOUNTER
Patient has history of an orchiectomy in December 2022 by Dr. Michael. Last seen in office post operatively by Dr. Michael in January 2023. Per check out note at that time patient was to return if any worsening symptoms.    Patient does have extensive history. Does have history of testicular ca with chemo that developed into an unknown neuromuscular syndrome. Has hx of anoxic brain injury. Patient is ventilator dependent with a trach.    Patient recently admitted into the hospital septic. Was coded at one point of hospitalization in which ACLS was needed.     Patient was seen outpatient by pulmonology who had ordered testosterone testing on patient which showed the following-  Component  Ref Range & Units 6/20/24  2:57 PM   Testosterone, Free  8.7 - 25.1 pg/mL 1.2 Low    TESTOSTERONE TOTAL  264 - 916 ng/dL 19 Low        Ok to follow up non urgently with AP or due to history should he be seen by MD

## 2024-07-11 NOTE — TELEPHONE ENCOUNTER
Ed Castañeda PA-C  You22 minutes ago (1:03 PM)     Non urgent with me       Please call and schedule non urgently with Hilario

## 2024-07-11 NOTE — PROGRESS NOTES
Virtual TCM Visit:    Verification of patient location:    Patient is located at Home in the following state in which I hold an active license [place]    Assessment & Plan   1. Impaired mobility  -     Ambulatory Referral to Physical Therapy; Future  Home PT referral placed.  Declined acute care rehab upon hospital discharge.  2. Neuromuscular weakness (HCC)  Assessment & Plan:  Family and patient declined acute care rehab after hospitalization.  Sister would like pt to start home PT, which he had received in the past  Referral placed  Has f/u with neuro  EMG yesterday    Orders:  -     Ambulatory Referral to Physical Therapy; Future  3. Neuromuscular disorder (HCC)  -     DULoxetine 40 MG CPEP; Take 1 capsule (40 mg total) by mouth daily at bedtime  4. Anxiety  Assessment & Plan:  Prev on prozac, switched to cymbalta 30 mg daily  Still picking at skin, seems anxious  Increase dose to 40 mg daily  Dmitry will report how he is doing with this.    5. Ventilator dependent (HCC)-continuing close follow-up with pulmonary.  6. Cardiac arrest due to other underlying condition (HCC)  Assessment & Plan:  Cardiac arrest during hospital stay assumed secondary to mucous plugging and hypoxia.  ACLS x 2 with ROSC.       Encounter provider Ela Douglas MD     Provider located at [facility]  [address]  [place] [address] [phone]    After connecting through [employer], the patient was identified by name and date of birth. Hemanth Swanson was informed that this is a telemedicine visit and that the visit is being conducted through the [employer] platform. He agrees to proceed..  My office door was closed. No one else was in the room.  He acknowledged consent and understanding of privacy and security of the video platform. The patient has agreed to participate and understands they can discontinue the visit at any time.    Patient is aware this is a billable  "service.    History of Present Illness     Transitional Care Management Review:   Hemanth Swanson is a 39 y.o. male here for TCM follow up.  He was hospitalized initially at [facility], then transferred to [facility] as he required video EEG monitoring.  When he was in the office with me for his initial appointment he was having notable episodes of hypoxia and was sent to the emergency department where he was admitted.  He continued to have episodes of hypoxia with bradycardia, sometimes associated with what was thought to be possible seizure activity, however on video EEG monitoring no seizure activity was noted.    He had an episode of asystole on 6-1-2024, [facility] was called, 2 rounds of ACLS, ROSC was obtained.    Recommendations were made in the hospital to consider maintaining patient on tube feeds given concern for aspiration and vomiting after oral feeds.  His sister notes today that they use the tube feeds when he is having a \"hard day\" with poor memory and limited ability to eat.  Other days they are using puréed and soft textured foods.  They are not giving him small volumes of food or liquid at the time which has helped avoid vomiting.  The patient has expressed a desire not to have tube feeds to his family as per his sister.        During the TCM phone call patient stated:  TCM Call     Date and time call was made  6/27/2024  2:27 PM    Hospital care reviewed  Records reviewed    Patient was hospitialized at  [facility]; [facility]    Date of Admission  05/30/24    Date of discharge  06/10/24    Diagnosis  Sepsis (HCC)    Disposition  Home    Were the patients medications reviewed and updated  Yes    Current Symptoms  None      TCM Call     Post hospital issues  None    Should patient be enrolled in anticoag monitoring?  No    Scheduled for follow up?  Yes        Pt for virtual visit with his sister and Lesly ABURTO provides hx.  Pt is resting in bed during " eval.  Wakes up to sister calling name and then returns to rest/sleep.  Saw pulm this week and had EMG.    He is currently breathing over the vent (set at 12, breathing at 19) and his O2 is 100%  His HR is ranging .  No alarms overnight.  Dmitry notes that they encourage him to cough first if he has mucous, then will suction if he can't clear the secretions.     Dmitry notes that he has low testosterone and will f/u with uro for supplement.     He still does get some episodes of shaking and sweating that lasts a few minutes and will resolve.  O2 isn't low when it happens.   Had eval for poss seizures, normal EEG.     Has neuro f/u visit next week.     Dmitry notes he can sit on his own.  He can transfer to wheelchair with assist x 1.  He is following commands.      Dmitry notes that his short term memory isn't good, but long-term memory is good.  Says some days he has more confusion.    He is telling family he doesn't want tube feeds, but he will eat purees and soft foods. Taking smaller portions through the day and he tolerates this well.  He will vomit if he eats too much or drinks too much all at once.  Drinks thin liquids, no coughing.      Uses tube feeds when he is having a hard day with confusion    Doesn't have home PT  Says they were told his insurance doesn't cover it, but the ins co told Dmitry that they do.   He did have home PT through [facility]'s homecare before.     He is having regular Bms  Not needing the senna or miralax.     No blood in stool, urine, sputum.     Mood- still seems down and anxious  Picking at his nails  On cymbalta 30 mg nightly  Just started while hospitalized.   Taking it at night b/c he was too tired during the day.        Review of Systems  Objective     BP 95/58   Pulse 79   Temp (!) 96.8 °F (36 °C)   SpO2 99%     Physical Exam  Vitals and nursing note reviewed.   Constitutional:       Comments: Resting comfortably, on ventilator.       Medications have been reviewed by  provider in current encounter      Ela Douglas MD      VIRTUAL VISIT DISCLAIMER    Hemanth Swanson verbally agrees to participate in Virtual Care Services. Pt is aware that Virtual Care Services could be limited without vital signs or the ability to perform a full hands-on physical exam. Hemanth Swanson understands he or the provider may request at any time to terminate the video visit and request the patient to seek care or treatment in person.

## 2024-07-11 NOTE — ASSESSMENT & PLAN NOTE
Cardiac arrest during hospital stay assumed secondary to mucous plugging and hypoxia.  ACLS x 2 with ROSC.

## 2024-07-11 NOTE — TELEPHONE ENCOUNTER
PT SCHEDULED AND DERIC MADE AWARE OF APPT AND LOCATION.     SHE HAD A QUESTION FOR CLINICAL. SHE WANTS TO KNOW IF HIS SWEATING ISSUES CAN BE COMING FROM THE TESTOSTERONE PROBLEM. HE IS CONSTANTLY SWEATING.   Patient away in xray.

## 2024-07-11 NOTE — TELEPHONE ENCOUNTER
Pt managed by ,referral for testosterone injections entered in workque please review 06/10/24 records I was unsure of scheduling protocol, pt's sister Dmitry is to be contacted for appointment scheduling and pt needs late pm appointments.

## 2024-07-11 NOTE — ASSESSMENT & PLAN NOTE
Prev on prozac, switched to cymbalta 30 mg daily  Still picking at skin, seems anxious  Increase dose to 40 mg daily  Dmitry will report how he is doing with this.

## 2024-07-12 ENCOUNTER — TELEPHONE (OUTPATIENT)
Age: 39
End: 2024-07-12

## 2024-07-12 NOTE — TELEPHONE ENCOUNTER
Reason for call:   [] Refill   [x] Prior Auth  [] Other:     Office:   [x] PCP/Provider - Dr Douglas  [] Specialty/Provider -     Medication:   Duloxetine 20 mg

## 2024-07-17 ENCOUNTER — OFFICE VISIT (OUTPATIENT)
Dept: NEUROLOGY | Facility: CLINIC | Age: 39
End: 2024-07-17
Payer: COMMERCIAL

## 2024-07-17 VITALS — HEART RATE: 55 BPM | SYSTOLIC BLOOD PRESSURE: 146 MMHG | DIASTOLIC BLOOD PRESSURE: 90 MMHG

## 2024-07-17 DIAGNOSIS — J96.11 CHRONIC RESPIRATORY FAILURE WITH HYPOXIA AND HYPERCAPNIA (HCC): Primary | ICD-10-CM

## 2024-07-17 DIAGNOSIS — J96.12 CHRONIC RESPIRATORY FAILURE WITH HYPOXIA AND HYPERCAPNIA (HCC): Primary | ICD-10-CM

## 2024-07-17 PROBLEM — G70.9 NEUROMUSCULAR DISORDER (HCC): Chronic | Status: RESOLVED | Noted: 2024-05-30 | Resolved: 2024-07-17

## 2024-07-17 PROBLEM — G70.9 NEUROMUSCULAR WEAKNESS (HCC): Status: RESOLVED | Noted: 2023-08-12 | Resolved: 2024-07-17

## 2024-07-17 PROCEDURE — 99215 OFFICE O/P EST HI 40 MIN: CPT | Performed by: PSYCHIATRY & NEUROLOGY

## 2024-07-17 NOTE — PROGRESS NOTES
"Please note: this note was created with voice recognition software. Occasional wrong word or \"sound alike\" substitutions may occur due to the inherent limitations of voice recognition software. Read the chart carefully and recognize (using context) where substitutions may have occurred. I am available to discuss any questions.       1. Chronic respiratory failure with hypoxia and hypercapnia (HCC)  Assessment & Plan:  I spent over 90 minutes reviewing the medical records, examining Mr. Swanson, and discussing the case with both him and his family.  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  He has unexplained respiratory failure; there is concern he may have neuromuscular disease.  Unfortunately, this does not appear to be the case.  Most cases of neuromuscular respiratory failure occur in the setting of chronic neuromuscular disease with eventual involvement of the respiratory muscles.  This can localize to different levels:  1) primary muscle disease.  Myotonic dystrophy can result in failure to wean.  He does not have clinical myotonia; his EMG did not show electrical myotonia.  He does not have a hatchet face, saber shins, or any of the other stigmata.  This diagnosis is excluded.  His CPK level has been normal with time; he does not have fixed proximal muscle weakness or myopathic features on his electrical study to suggest that this is the case. Amarilys-1 antibody testing was negative.  Pompe disease (adult onset acid maltase deficiency) is a consideration and can presents as failure to wean.  \"Burned-out polymyositis\" is a more common presentation of this condition.  This condition usually causes florid electrical irritable features, especially chronic repetitive discharges.  Rarely, the thoracic paraspinous musculature can be the only site of electrical abnormality in this muscle could not be studied because of his anticoagulation.  I do not suspect this clinically but based on " questions from the admitting team I had suggested testing for this condition, which was done and has been sent to the [facility].  I would be stunned if this diagnosis is correct but if the test comes back he would need to be treated as such.  2) Denervating disease.  Specifically, motor neuron disease such as Alla Gehrig's disease can cause respiratory failure.  This is certainly can affect the respiratory muscles first but is not common.  Most importantly, he has no fasciculations clinically or electrically and except for a subclinical radiculopathy has no denervation in the extremities.  This possibility is excluded.  3) neuromuscular disease.  Specifically, myasthenia gravis.  Repetitive nerve stimulation testing of the ulnar nerve was negative.  This is not as sensitive as the facial or spinal accessory nerve but these test could not be performed for technical reasons.  However, full antibody testing including binding antibodies, blocking antibodies, modulating antibodies, LRP-4 antibodies, and MuSK antibody testing has been negative.  I see no other evidence on his examination to suggest that this diagnosis is correct, and I feel this diagnosis has essentially been excluded.  Lambert-Eaton syndrome was tested for and has been excluded based on antibody testing; he did not have diminished CMAP amplitudes to suggest this diagnosis.  As well, this condition would have been expected to cause proximal muscle weakness and is not expected to cause respiratory failure.\    In summary, given the lack of an obvious cause of his respiratory failure it is reasonable to consider neuromuscular disease.  However, there is no evidence that this is the case.  Specifically, there is nothing to suggest primary muscle disease, primary nerve disease, or neuromuscular junction disorders.  I do not suspect Pompe disease but agree with testing given the diagnostic uncertainty in his case.  If this test comes back abnormal then  "treatment would generally be supportive.  However, I do not believe this is the case.  The family understands this is good news but also bad news since I was not able to provide them with a diagnosis.  I answered all of their questions to their satisfaction.      Problem List Items Addressed This Visit       Chronic respiratory failure with hypoxia and hypercapnia (HCC) - Primary     I spent over 90 minutes reviewing the medical records, examining Mr. Swanson, and discussing the case with both him and his family.  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  He has unexplained respiratory failure; there is concern he may have neuromuscular disease.  Unfortunately, this does not appear to be the case.  Most cases of neuromuscular respiratory failure occur in the setting of chronic neuromuscular disease with eventual involvement of the respiratory muscles.  This can localize to different levels:  1) primary muscle disease.  Myotonic dystrophy can result in failure to wean.  He does not have clinical myotonia; his EMG did not show electrical myotonia.  He does not have a hatchet face, saber shins, or any of the other stigmata.  This diagnosis is excluded.  His CPK level has been normal with time; he does not have fixed proximal muscle weakness or myopathic features on his electrical study to suggest that this is the case. Amarilys-1 antibody testing was negative.  Pompe disease (adult onset acid maltase deficiency) is a consideration and can presents as failure to wean.  \"Burned-out polymyositis\" is a more common presentation of this condition.  This condition usually causes florid electrical irritable features, especially chronic repetitive discharges.  Rarely, the thoracic paraspinous musculature can be the only site of electrical abnormality in this muscle could not be studied because of his anticoagulation.  I do not suspect this clinically but based on questions from the admitting team I " had suggested testing for this condition, which was done and has been sent to the [facility].  I would be stunned if this diagnosis is correct but if the test comes back he would need to be treated as such.  2) Denervating disease.  Specifically, motor neuron disease such as Alla Gehrig's disease can cause respiratory failure.  This is certainly can affect the respiratory muscles first but is not common.  Most importantly, he has no fasciculations clinically or electrically and except for a subclinical radiculopathy has no denervation in the extremities.  This possibility is excluded.  3) neuromuscular disease.  Specifically, myasthenia gravis.  Repetitive nerve stimulation testing of the ulnar nerve was negative.  This is not as sensitive as the facial or spinal accessory nerve but these test could not be performed for technical reasons.  However, full antibody testing including binding antibodies, blocking antibodies, modulating antibodies, LRP-4 antibodies, and MuSK antibody testing has been negative.  I see no other evidence on his examination to suggest that this diagnosis is correct, and I feel this diagnosis has essentially been excluded.  Lambert-Eaton syndrome was tested for and has been excluded based on antibody testing; he did not have diminished CMAP amplitudes to suggest this diagnosis.  As well, this condition would have been expected to cause proximal muscle weakness and is not expected to cause respiratory failure.\    In summary, given the lack of an obvious cause of his respiratory failure it is reasonable to consider neuromuscular disease.  However, there is no evidence that this is the case.  Specifically, there is nothing to suggest primary muscle disease, primary nerve disease, or neuromuscular junction disorders.  I do not suspect Pompe disease but agree with testing given the diagnostic uncertainty in his case.  If this test comes back abnormal then treatment would generally be supportive.   However, I do not believe this is the case.  The family understands this is good news but also bad news since I was not able to provide them with a diagnosis.  I answered all of their questions to their satisfaction.            Problem List       Anxiety (Chronic)    Depression (Chronic)    History of LVH (left ventricular hypertrophy) (Chronic)    Mixed axonal-demyelinating polyneuropathy (Chronic)    Obesity hypoventilation syndrome (HCC) (Chronic)    Port-A-Cath in place (Chronic)    Pure hypertriglyceridemia (Chronic)    S/P percutaneous endoscopic gastrostomy (PEG) tube placement (HCC) (Chronic)    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HCC) (Chronic)    Status post tracheostomy (HCC) (Chronic)    Tobacco use (Chronic)    Umbilical hernia without obstruction and without gangrene (Chronic)    Ventilator dependent (HCC) (Chronic)    Acute pulmonary embolism without acute cor pulmonale (HCC)    Bradycardia    Cardiac arrest due to other underlying condition (HCC)    Chronic respiratory failure with hypoxia and hypercapnia (HCC)    Current Assessment & Plan     I spent over 90 minutes reviewing the medical records, examining Mr. Swanson, and discussing the case with both him and his family.  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  He has unexplained respiratory failure; there is concern he may have neuromuscular disease.  Unfortunately, this does not appear to be the case.  Most cases of neuromuscular respiratory failure occur in the setting of chronic neuromuscular disease with eventual involvement of the respiratory muscles.  This can localize to different levels:  1) primary muscle disease.  Myotonic dystrophy can result in failure to wean.  He does not have clinical myotonia; his EMG did not show electrical myotonia.  He does not have a hatchet face, saber shins, or any of the other stigmata.  This diagnosis is excluded.  His CPK level has been normal with time; he does not have fixed  "proximal muscle weakness or myopathic features on his electrical study to suggest that this is the case. Amarilys-1 antibody testing was negative.  Pompe disease (adult onset acid maltase deficiency) is a consideration and can presents as failure to wean.  \"Burned-out polymyositis\" is a more common presentation of this condition.  This condition usually causes florid electrical irritable features, especially chronic repetitive discharges.  Rarely, the thoracic paraspinous musculature can be the only site of electrical abnormality in this muscle could not be studied because of his anticoagulation.  I do not suspect this clinically but based on questions from the admitting team I had suggested testing for this condition, which was done and has been sent to the [facility].  I would be stunned if this diagnosis is correct but if the test comes back he would need to be treated as such.  2) Denervating disease.  Specifically, motor neuron disease such as Alla Gehrig's disease can cause respiratory failure.  This is certainly can affect the respiratory muscles first but is not common.  Most importantly, he has no fasciculations clinically or electrically and except for a subclinical radiculopathy has no denervation in the extremities.  This possibility is excluded.  3) neuromuscular disease.  Specifically, myasthenia gravis.  Repetitive nerve stimulation testing of the ulnar nerve was negative.  This is not as sensitive as the facial or spinal accessory nerve but these test could not be performed for technical reasons.  However, full antibody testing including binding antibodies, blocking antibodies, modulating antibodies, LRP-4 antibodies, and MuSK antibody testing has been negative.  I see no other evidence on his examination to suggest that this diagnosis is correct, and I feel this diagnosis has essentially been excluded.  Lambert-Eaton syndrome was tested for and has been excluded based on antibody testing; he did not have " "diminished CMAP amplitudes to suggest this diagnosis.  As well, this condition would have been expected to cause proximal muscle weakness and is not expected to cause respiratory failure.\    In summary, given the lack of an obvious cause of his respiratory failure it is reasonable to consider neuromuscular disease.  However, there is no evidence that this is the case.  Specifically, there is nothing to suggest primary muscle disease, primary nerve disease, or neuromuscular junction disorders.  I do not suspect Pompe disease but agree with testing given the diagnostic uncertainty in his case.  If this test comes back abnormal then treatment would generally be supportive.  However, I do not believe this is the case.  The family understands this is good news but also bad news since I was not able to provide them with a diagnosis.  I answered all of their questions to their satisfaction.         Diplopia    Impaired mobility    Psychophysiological insomnia    Retroperitoneal lymphadenopathy    Seizure-like activity (HCC)    Unilateral vestibular schwannoma (HCC)         I had the pleasure of seeing Hemanth Swanson, a 39 y.o. male, for neuromuscular consultation. The referral was for unexplained respiratory failure.     Approximately 1 year he was diagnosed with testicular seminoma, metastatic to the lymph nodes.  Previously he was in excellent health.  He was a  who had no alteration in his activity level.  He was treated with orchiectomy and chemotherapy.  Before the onset of his chemotherapy his family noticed that he was \"leaning back\" while walking so his family would put a hand on his back.  Eventually he was diagnosed with respiratory failure.  He was admitted to the hospital for 6 weeks and was discharged with a tracheostomy.  His sister tells me that even after discharge he was able to walk and push his ventilator around with 1 person assisting him.  However, after an episode of aspiration he was " admitted back to the hospital.  With each hospitalization his degree of debility worsens.  He is able to stand but only with the assistance of multiple people.  He is still able to communicate by using his telephone and his family describes him as being cognitively normal.  Because his respiratory failure has not been fully explained there has been concern that he may have a neuromuscular disease that could account for his problems.  Spinal fluid analysis has been performed and was negative.  He had an EMG performed by Dr. Ruiz recently.  The leg was electrically normal; he had some diminished SNAP amplitudes in the arm thought to potentially represent underlying neuropathy but the normal leg was not in keeping with this possibility.  He had electrical findings suggesting chronic right C5-6 radiculopathy but otherwise no evidence of denervation.  No myopathic features were present.  Repetitive nerve stimulation of the ulnar nerve did not show a decremental response.  Acetylcholine receptor binding, blocking, and antibody testing were negative. MuSK and LRP antibody testing were performed and were negative.  His maximal CPK level in July 2022 was 113 with the most recent value being 31.  Cranial MRI was most recently performed in November 2023 which showed microangiopathic changes.  Autoimmune encephalopathy panel was performed and was negative. Amarilys-1 antibody testing was negative.  Voltage-gated calcium channel antibodies, ganglioside panel, DARIN antibody testing, and MAG testing were negative.  Testing for Pompe disease was drawn and sent to the [facility], but the results are not yet available.      Past Medical History:   Diagnosis Date    Anxiety     Cancer (HCC)     Depression     Migration of percutaneous endoscopic gastrostomy (PEG) tube (HCC) 09/24/2023    Psychiatric disorder     bipolar       Past Surgical History:   Procedure Laterality Date    IR BIOPSY LYMPH NODE  01/20/2023    IR GASTROSTOMY (G) TUBE  "CHECK/CHANGE/REINSERTION/UPSIZE  2024    IR GASTROSTOMY TUBE PLACEMENT  2023    IR LUMBAR PUNCTURE  2023    IR PORT PLACEMENT  2023    ORCHIECTOMY Left 2022    Procedure: ORCHIECTOMY INGUINAL;  Surgeon: Flip Michael MD;  Location:  MAIN OR;  Service: Urology    PEG W/TRACHEOSTOMY PLACEMENT N/A 2023    Procedure: TRACHEOSTOMY WITH INSERTION PEG TUBE;  Surgeon: Rudy Mcmanus MD;  Location: [facility] OR;  Service: General    TESTICLE SURGERY         Dog epithelium    Social History     Tobacco Use   Smoking Status Former    Current packs/day: 0.00    Average packs/day: 0.7 packs/day for 22.7 years (15.0 ttl pk-yrs)    Types: Cigarettes    Start date: 2008    Quit date: 2023    Years since quittin.1   Smokeless Tobacco Never       Social History     Substance and Sexual Activity   Alcohol Use Not Currently    Comment: Former alcoholic. Last use in 2016       Social History     Substance and Sexual Activity   Drug Use Not Currently    Types: \"Crack\" cocaine, Marijuana    Comment: Current use medical marijuana. Not currently using crack cocaine.       Family History   Problem Relation Age of Onset    Coronary artery disease Maternal Aunt     Cancer Father     Diabetes Father     Hypertension Father          Current Outpatient Medications:     albuterol (2.5 mg/3 mL) 0.083 % nebulizer solution, every 8 (eight) hours, Disp: , Rfl:     apixaban (ELIQUIS) 5 mg, 1 tablet (5 mg total) by Per PEG Tube route 2 (two) times a day, Disp: 60 tablet, Rfl: 2    DULoxetine 40 MG CPEP, Take 1 capsule (40 mg total) by mouth daily at bedtime, Disp: 30 capsule, Rfl: 2    Incontinence Supply Disposable (Comfort Shield Adult Diapers) MISC, Use 1 each 4 (four) times a day, Disp: 48 each, Rfl: 5    Oral Hygiene Products (Toothette Swabs/Dentifrice) SWAB, Apply to the mouth or throat 3 (three) times a day, Disp: 1000 each, Rfl: 1    oxygen gas, Inhale 6 L/min as needed, Disp: , Rfl:     " sodium chloride (BRONCHO SALINE) inhaler solution, Take 1 spray by nebulization every 8 (eight) hours, Disp: , Rfl:     No results displayed because visit has over 200 results.      No results displayed because visit has over 200 results.      Orders Only on 04/24/2024   Component Date Value Ref Range Status    Supplier Name 04/24/2024 [employer]   Final-Edited    Supplier Phone Number 04/24/2024 [phone]   Final-Edited    Order Status 04/24/2024 Completed   Final-Edited    Delivery Request Date 04/24/2024 04/24/2024   Final-Edited    Date Delivered  04/24/2024 05/23/2024   Final-Edited    Item Description 04/24/2024 Lluvia Deng   Final-Edited    Comment: Qty: 1  Hz: 6-15  Minutes: 30  Daily: 2x  Pressure: %  Daily minimum: 10 minutes     Admission on 02/10/2024, Discharged on 02/11/2024   Component Date Value Ref Range Status    WBC 02/10/2024 19.86 (H)  4.31 - 10.16 Thousand/uL Final    RBC 02/10/2024 4.12  3.88 - 5.62 Million/uL Final    Hemoglobin 02/10/2024 12.2  12.0 - 17.0 g/dL Final    Hematocrit 02/10/2024 37.9  36.5 - 49.3 % Final    MCV 02/10/2024 92  82 - 98 fL Final    MCH 02/10/2024 29.6  26.8 - 34.3 pg Final    MCHC 02/10/2024 32.2  31.4 - 37.4 g/dL Final    RDW 02/10/2024 14.6  11.6 - 15.1 % Final    MPV 02/10/2024 9.0  8.9 - 12.7 fL Final    Platelets 02/10/2024 353  149 - 390 Thousands/uL Final    nRBC 02/10/2024 0  /100 WBCs Final    Segmented % 02/10/2024 87 (H)  43 - 75 % Final    Immature Grans % 02/10/2024 2  0 - 2 % Final    Lymphocytes % 02/10/2024 7 (L)  14 - 44 % Final    Monocytes % 02/10/2024 4  4 - 12 % Final    Eosinophils Relative 02/10/2024 0  0 - 6 % Final    Basophils Relative 02/10/2024 0  0 - 1 % Final    Absolute Neutrophils 02/10/2024 17.15 (H)  1.85 - 7.62 Thousands/µL Final    Absolute Immature Grans 02/10/2024 0.39 (H)  0.00 - 0.20 Thousand/uL Final    Absolute Lymphocytes 02/10/2024 1.36  0.60 - 4.47 Thousands/µL Final    Absolute Monocytes 02/10/2024 0.84   0.17 - 1.22 Thousand/µL Final    Eosinophils Absolute 02/10/2024 0.07  0.00 - 0.61 Thousand/µL Final    Basophils Absolute 02/10/2024 0.05  0.00 - 0.10 Thousands/µL Final    Sodium 02/10/2024 130 (L)  135 - 147 mmol/L Final    Potassium 02/10/2024 3.9  3.5 - 5.3 mmol/L Final    Chloride 02/10/2024 91 (L)  96 - 108 mmol/L Final    CO2 02/10/2024 29  21 - 32 mmol/L Final    ANION GAP 02/10/2024 10  mmol/L Final    BUN 02/10/2024 6  5 - 25 mg/dL Final    Creatinine 02/10/2024 0.82  0.60 - 1.30 mg/dL Final    Standardized to IDMS reference method    Glucose 02/10/2024 111  65 - 140 mg/dL Final    If the patient is fasting, the ADA then defines impaired fasting glucose as > 100 mg/dL and diabetes as > or equal to 123 mg/dL.    Calcium 02/10/2024 9.1  8.4 - 10.2 mg/dL Final    eGFR 02/10/2024 113  ml/min/1.73sq m Final    SARS-CoV-2 02/10/2024 Negative  Negative Final    INFLUENZA A PCR 02/10/2024 Negative  Negative Final    INFLUENZA B PCR 02/10/2024 Negative  Negative Final    RSV PCR 02/10/2024 Negative  Negative Final    pH, Rasheed 02/10/2024 7.259 (L)  7.300 - 7.400 Final    pCO2, Rasheed 02/10/2024 67.8 (H)  42.0 - 50.0 mm Hg Final    pO2, Rasheed 02/10/2024 27.9 (L)  35.0 - 45.0 mm Hg Final    HCO3, Rasheed 02/10/2024 29.7  24 - 30 mmol/L Final    Base Excess, Rasheed 02/10/2024 0.9  mmol/L Final    O2 Content, Rasheed 02/10/2024 7.6  ml/dL Final    O2 HGB, VENOUS 02/10/2024 40.8 (L)  60.0 - 80.0 % Final    Ventricular Rate 02/10/2024 90  BPM Final    Atrial Rate 02/10/2024 90  BPM Final    IN Interval 02/10/2024 164  ms Final    QRSD Interval 02/10/2024 90  ms Final    QT Interval 02/10/2024 350  ms Final    QTC Interval 02/10/2024 428  ms Final    P Axis 02/10/2024 55  degrees Final    QRS [place] 02/10/2024 74  degrees Final    T Wave [place] 02/10/2024 -4  degrees Final    Color, UA 02/10/2024 Orange (A)  Yellow Final    Clarity, UA 02/10/2024 Clear  Clear Final    Specific Gravity, UA 02/10/2024 >=1.030  1.001 - 1.030 Final    pH, UA  02/10/2024 6.0  5.0, 5.5, 6.0, 6.5, 7.0, 7.5, 8.0 Final    Leukocytes, UA 02/10/2024 Negative  Negative Final    Nitrite, UA 02/10/2024 Negative  Negative Final    Protein, UA 02/10/2024 1+ (A)  Negative, Interference- unable to analyze mg/dl Final    Glucose, UA 02/10/2024 Negative  Negative mg/dl Final    Ketones, UA 02/10/2024 Negative  Negative mg/dl Final    Urobilinogen, UA 02/10/2024 1.0  0.2, 1.0 E.U./dl E.U./dl Final    Bilirubin, UA 02/10/2024 Negative  Negative Final    Occult Blood, UA 02/10/2024 Negative  Negative Final    Procalcitonin 02/10/2024 0.06  <=0.25 ng/ml Final    Comment: Suspected Lower Respiratory Tract Infection (LRTI):  - LESS than or EQUAL to 0.25 ng/mL:   low likelihood for bacterial LRTI; antibiotics DISCOURAGED.  - GREATER than 0.25 ng/mL:   increased likelihood for bacterial LRTI; antibiotics ENCOURAGED.    Suspected Sepsis:  - Strongly consider initiating antibiotics in ALL UNSTABLE patients.  - LESS than or EQUAL to 0.5 ng/mL:   low likelihood for bacterial sepsis; antibiotics DISCOURAGED.  - GREATER than 0.5 ng/mL:   increased likelihood for bacterial sepsis; antibiotics ENCOURAGED.  - GREATER than 2 ng/mL:   high risk for severe sepsis / septic shock; antibiotics strongly ENCOURAGED.    Decisions on antibiotic use should not be based solely on Procalcitonin (PCT) levels. If PCT is low but uncertainty exists with stopping antibiotics, repeat PCT in 6-24 hours to confirm the low level. If antibiotics are administered (regardless if initial PCT was high or low), repeat PCT every 1-2 days to consider early antibiotic cessation (when GREATER                            than 80% decrease from the peak OR when PCT drops below designated cutoffs, whichever comes first), so long as the infection is NOT one that typically requires prolonged treatment durations (e.g., bone/joint infections, endocarditis, Staph. aureus bacteremia).    Situations of FALSE-POSITIVE Procalcitonin values:  1)  Newborns < 72 hours old  2) Massive stress from severe trauma / burns, major surgery, acute pancreatitis, cardiogenic / hemorrhagic shock, sickle cell crisis, or other organ perfusion abnormalities  3) Malaria and some Candidal infections  4) Treatment with agents that stimulate cytokines (e.g., OKT3, anti-lymphocyte globulins, alemtuzumab, IL-2, granulocyte transfusion [NOT GCSFs])  5) Chronic renal disease causes elevated baseline levels (consider GREATER than 0.75 ng/mL as an abnormal cut-off); initiating HD/CRRT may cause transient decreases  6) Paraneoplastic syndromes from medullary thyroid or SCLC, some forms of vasculitis, and acute [id number]                            disease    Situations of FALSE-NEGATIVE Procalcitonin values:  1) Too early in clinical course for PCT to have reached its peak (may repeat in 6-24 hours to confirm low level)  2) Localized infection WITHOUT systemic (SIRS / sepsis) response (e.g., an abscess, osteomyelitis, cystitis)  3) Mycobacteria (e.g., Tuberculosis, MAC)  4) Cystic fibrosis exacerbations      LACTIC ACID 02/10/2024 1.8  0.5 - 2.0 mmol/L Final    RBC, UA 02/10/2024 0-5  None Seen, 0-1, 1-2, 2-4, 0-5 /hpf Final    WBC, UA 02/10/2024 4-10 (A)  None Seen, 0-1, 1-2, 0-5, 2-4 /hpf Final    Epithelial Cells 02/10/2024 Occasional  None Seen, Occasional /hpf Final    Bacteria, UA 02/10/2024 Occasional  None Seen, Occasional /hpf Final    MUCUS THREADS 02/10/2024 Moderate (A)  None Seen Final   Orders Only on 02/05/2024   Component Date Value Ref Range Status    Supplier Name 02/05/2024 [employer] [facility] [employer]   Final-Edited    Supplier Phone Number 02/05/2024 [phone]   Final-Edited    Order Status 02/05/2024 Delivery Successful   Final-Edited    Delivery Request Date 02/05/2024 02/05/2024   Final-Edited    Date Delivered  02/05/2024 02/06/2024   Final-Edited    Item Description 02/05/2024 Pressure Change   Final-Edited    Comment: Qty: 1  Bilevel PAP  Inspiratory Settin cm  Bilevel PAP Expiratory Settin cm      Item Description 2024 Tracheostomy Tube, Adult, Standard Length, 6, Cuffed, Non-Fenestrated (Both)   Final-Edited    Qty: 1    Item Description 2024 Tracheostomy Ties   Final-Edited    Qty: 3    Item Description 2024 Tracheostomy Care Kit   Final-Edited    Qty: 30    Item Description 2024 Saline Bullets (Box), Sterile Saline or Water, up to 5CC   Final-Edited    Qty: 2    Item Description 2024 Disposable Inner Cannula (2x Daily Change)   Final-Edited    Qty: 60    Item Description 2024 Reusable Inner Cannula, 6   Final-Edited    Qty: 1        No results found for this or any previous visit.     No results found for this or any previous visit.    Results for orders placed during the hospital encounter of 06/10/24    MRI brain w wo contrast    Narrative  MRI BRAIN WITH AND WITHOUT CONTRAST    INDICATION: Seizure-like activity. History of metastatic testicular seminoma status post chemotherapy. Hypertension.    COMPARISON: CT dated 6/10/2024. Several prior MRIs most recent performed on 2023.    TECHNIQUE:  Multiplanar, multisequence imaging of the brain was performed before and after gadolinium administration.      IV Contrast:  11 mL of Gadobutrol injection (SINGLE-DOSE)    IMAGE QUALITY:   Diagnostic.    FINDINGS:    BRAIN PARENCHYMA: No restricted diffusion to indicate an acute infarct. No acute hemorrhage, mass, mass effect, shift or herniation. Stable mild cortical FLAIR signal in the hypothalamic mammillary body region likely sequela of prior Wernicke's  encephalopathy. No new or residual enhancement. There are stable T2/FLAIR hyperintensities in the periventricular and subcortical matter that are nonspecific but may be due to precocious chronic microangiopathy.    Postcontrast imaging of the brain demonstrates no abnormal enhancement.    VENTRICLES: Stable. No hydrocephalus. Mild volume loss.    SELLA  AND PITUITARY GLAND:  Normal.    ORBITS:  Normal.    PARANASAL SINUSES: Mild right anterior ethmoid mucosal thickening.    VASCULATURE:  Evaluation of the major intracranial vasculature demonstrates appropriate flow voids.    CALVARIUM AND SKULL BASE:  Normal.    EXTRACRANIAL SOFT TISSUES:  Normal.    Impression  No acute intracranial pathology.  Stable mild nonspecific white matter changes likely due to chronic microangiopathy.    Workstation performed: [id number]      Results for orders placed during the hospital encounter of 08/11/23    MRI brain w wo contrast    Narrative  MRI BRAIN WITH AND WITHOUT CONTRAST    INDICATION: ams .   History of chronic diastolic heart failure and hypertension.    COMPARISON: Head CT from August 11, 2023. MRI of the brain from July 31, 2023, MRI of the brain from June 12, 2023    TECHNIQUE:  Multiplanar, multisequence imaging of the brain was performed before and after gadolinium administration.      IV Contrast:  15 mL of Gadobutrol injection (SINGLE-DOSE)    IMAGE QUALITY:   Diagnostic.    FINDINGS: The patient has an oral catheter.    BRAIN PARENCHYMA:  There is no discrete mass, mass effect or midline shift. There is no intracranial hemorrhage.  Normal posterior fossa.  Diffusion imaging is unremarkable.    There is symmetric FLAIR hyperintense signal within the hypothalamus with questionable signal abnormality in the mamillary bodies, left more than right. There is associated enhancement. No signal abnormality is identified within the thalami or the basal  ganglia. The pituitary stalk is normal in thickness and enhancement pattern. These findings are stable when comparing to the MRI study from March 15, 2023.      Stable nonspecific scattered foci of T2/FLAIR hyperintensity within the periventricular, and subcortical regions of both cerebral hemispheres. Given the patient's history, findings are likely the sequela of chronic microangiopathic disease.    VENTRICLES:  Normal for  the patient's age.    SELLA AND PITUITARY GLAND:  Normal.    ORBITS:  Normal.    PARANASAL SINUSES: Mild inflammatory mucosal thickening within the ethmoid air cells. Layering secretions within the nasal cavity and nasopharynx.    VASCULATURE:  Evaluation of the major intracranial vasculature demonstrates appropriate flow voids.    CALVARIUM AND SKULL BASE:  Normal.    EXTRACRANIAL SOFT TISSUES:  Normal.    Impression  No acute intracranial abnormality.    Stable nonspecific enhancement along the anterior floor of the third ventricle/hypothalamus when comparing to the March 15, 2023 study. Differential includes chronic toxic metabolic insult (thiamine deficiency), chronic inflammatory/ infectious  conditions. Other considerations include Langerhans cell cell histiocytosis, lymphocytic hypophysitis, and neurosyphilis. Primary CNS lymphoma is a less likely consideration, particularly given the lack of interval change. Serologic correlation is  recommended.    Stable cerebral white matter signal abnormality, presumably the sequela of chronic microangiopathic disease given the patient's history.    The study was marked in EPIC for significant notification.    Workstation performed: [id number]    No results found for this or any previous visit.    No results found for this or any previous visit.    No results found for this or any previous visit.    No results found for this or any previous visit.    No results found for this or any previous visit.    No results found for this or any previous visit.    No results found for this or any previous visit.    No results found for this or any previous visit.    No results found for this or any previous visit.      Review of Systems      On examination,     Blood pressure 146/90, pulse 55.    Well developed, well nourished, in no acute distress    Normocephalic, atraumatic    Heart: regular rate and rhythm  I did not hear a carotid bruit    Extremities: no clubbing, cyanosis, or  edema    Awake, alert, briskly able to follow complex commands    Cranial nerves:  II: Pupils equal, round, and reactive to light. No gross visual field defect. I did not appreciate optic disc edema.  III, IV, VI: Extraocular movements intact  V: Normal facial sensation in all three divisions of the trigeminal nerve bilaterally  VII: Normal facial strength  VIII: Hearing intact to finger rub bilaterally  IX, X: Palate elevated symmetrically  XI: Sternocleidomastoid strength normal bilaterally  XII: Tongue protruded in midline without atrophy or fibrillations    Motor:  Normal tone and bulk throughout.     Strength was at least 4+/5 in all muscles of the arms and legs, proximally and distally  No , percussion, or eyelid myotonia    Deep tendon reflexes:   Absent in the biceps, triceps, brachioradialis, patellas, and ankles  Toes downgoing (no Babinski sign)  No Cornejo's sign    Sensation: Normal light touch throughout          There are no Patient Instructions on file for this visit.      Thank you very much for allowing me to participate in your patient's care. Please feel free to contact me for any questions or concerns. Please be aware of the inherent limitations of voice recognition software, which may result in transcriptional errors.    Jp Dc MD

## 2024-07-17 NOTE — ASSESSMENT & PLAN NOTE
"I spent over 90 minutes reviewing the medical records, examining Mr. Swanson, and discussing the case with both him and his family.  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  He has unexplained respiratory failure; there is concern he may have neuromuscular disease.  Unfortunately, this does not appear to be the case.  Most cases of neuromuscular respiratory failure occur in the setting of chronic neuromuscular disease with eventual involvement of the respiratory muscles.  This can localize to different levels:  1) primary muscle disease.  Myotonic dystrophy can result in failure to wean.  He does not have clinical myotonia; his EMG did not show electrical myotonia.  He does not have a hatchet face, saber shins, or any of the other stigmata.  This diagnosis is excluded.  His CPK level has been normal with time; he does not have fixed proximal muscle weakness or myopathic features on his electrical study to suggest that this is the case. Amarilys-1 antibody testing was negative.  Pompe disease (adult onset acid maltase deficiency) is a consideration and can presents as failure to wean.  \"Burned-out polymyositis\" is a more common presentation of this condition.  This condition usually causes florid electrical irritable features, especially chronic repetitive discharges.  Rarely, the thoracic paraspinous musculature can be the only site of electrical abnormality in this muscle could not be studied because of his anticoagulation.  I do not suspect this clinically but based on questions from the admitting team I had suggested testing for this condition, which was done and has been sent to the [facility].  I would be stunned if this diagnosis is correct but if the test comes back he would need to be treated as such.  2) Denervating disease.  Specifically, motor neuron disease such as Alla Gehrig's disease can cause respiratory failure.  This is certainly can affect the respiratory muscles " first but is not common.  Most importantly, he has no fasciculations clinically or electrically and except for a subclinical radiculopathy has no denervation in the extremities.  This possibility is excluded.  3) neuromuscular disease.  Specifically, myasthenia gravis.  Repetitive nerve stimulation testing of the ulnar nerve was negative.  This is not as sensitive as the facial or spinal accessory nerve but these test could not be performed for technical reasons.  However, full antibody testing including binding antibodies, blocking antibodies, modulating antibodies, LRP-4 antibodies, and MuSK antibody testing has been negative.  I see no other evidence on his examination to suggest that this diagnosis is correct, and I feel this diagnosis has essentially been excluded.  Lambert-Eaton syndrome was tested for and has been excluded based on antibody testing; he did not have diminished CMAP amplitudes to suggest this diagnosis.  As well, this condition would have been expected to cause proximal muscle weakness and is not expected to cause respiratory failure.\    In summary, given the lack of an obvious cause of his respiratory failure it is reasonable to consider neuromuscular disease.  However, there is no evidence that this is the case.  Specifically, there is nothing to suggest primary muscle disease, primary nerve disease, or neuromuscular junction disorders.  I do not suspect Pompe disease but agree with testing given the diagnostic uncertainty in his case.  If this test comes back abnormal then treatment would generally be supportive.  However, I do not believe this is the case.  The family understands this is good news but also bad news since I was not able to provide them with a diagnosis.  I answered all of their questions to their satisfaction.

## 2024-07-18 NOTE — TELEPHONE ENCOUNTER
Pt's sister Dmitry called asking if it's ok for pt to take testosterone supplements along with the testosterone shots.     Pt has sweats and shivers.     Please review

## 2024-07-22 ENCOUNTER — TELEPHONE (OUTPATIENT)
Age: 39
End: 2024-07-22

## 2024-07-22 LAB — MISCELLANEOUS LAB TEST RESULT: NORMAL

## 2024-07-22 NOTE — TELEPHONE ENCOUNTER
Pulmonary    I discussed with [facility] respiratory therapist.  When he switches over from full support to BiPAP he has heart rate abnormalities and he feels more diaphoretic than usual.  He might not be ready for BiPAP at this point.    We can switch him over to SIMV and see which settings work for him.  She will see him tomorrow and adjust accordingly.    Plan  Hold off on BiPAP  Try SIMV  Backup should be full support

## 2024-07-22 NOTE — TELEPHONE ENCOUNTER
Charley resp therapist at [facility] calling stating the patient is having a hard time transitioning modes on Vent. She would like a call back to discuss suggestions you have to help patient. Please call her when you can at [phone].

## 2024-07-23 ENCOUNTER — TELEPHONE (OUTPATIENT)
Age: 39
End: 2024-07-23

## 2024-07-23 NOTE — TELEPHONE ENCOUNTER
Steff from [facility] from department of grievances and complaints called to notify PCP that patient/home health agency has requested patient receive 24 hour care, currently is receiving 20 hours a day. Insurance is asking for PCP recommendation if this is needed and would be in a critcal state or impending death if not care for 24 hours. Request is for an additional 28 hours a month. If PCP advises to have 24 hour care then documentation for medical necessity would be needed. Please advise.

## 2024-07-25 NOTE — TELEPHONE ENCOUNTER
Please create letter as follows and also advise the patient's family that if the insurance/home health agency needs more specifics from a respiratory standpoint, they may need to have pulmonary provide information as well.    Please verify w/ them why they need the extra hours- I assume for suctioning/desaturations while patient is sleeping?      Please increase Hemanth Swanson's home health assistance to 24hours/day.  He is ventilator dependent for chronic respiratory failure with hypoxia and hypercapnia.  He has had a recent prolonged hospital stay due to episodes of hypoxia accompanied by bradycardia.  He requires suctioning with desaturations. He has difficulty managing secretions.  He requires assistance with all ADLs.  He is following closely with pulmonology for his respiratory needs.  He requires albuterol every four hours and hypertonic saline every 8 hours.

## 2024-07-28 ENCOUNTER — HOSPITAL ENCOUNTER (EMERGENCY)
Facility: HOSPITAL | Age: 39
Discharge: HOME | End: 2024-07-28
Attending: EMERGENCY MEDICINE | Admitting: EMERGENCY MEDICINE
Payer: COMMERCIAL

## 2024-07-28 VITALS
SYSTOLIC BLOOD PRESSURE: 128 MMHG | HEART RATE: 60 BPM | DIASTOLIC BLOOD PRESSURE: 88 MMHG | OXYGEN SATURATION: 99 % | TEMPERATURE: 98.5 F | RESPIRATION RATE: 16 BRPM

## 2024-07-28 DIAGNOSIS — J95.00 TRACHEOSTOMY COMPLICATION (HCC): Primary | ICD-10-CM

## 2024-07-28 DIAGNOSIS — Z43.0 ENCOUNTER FOR TRACHEOSTOMY TUBE CHANGE (HCC): ICD-10-CM

## 2024-07-28 PROCEDURE — 31615 TRCHEOBRNCHSC EST TRACHS INC: CPT | Performed by: OTOLARYNGOLOGY

## 2024-07-28 PROCEDURE — 99284 EMERGENCY DEPT VISIT MOD MDM: CPT | Performed by: EMERGENCY MEDICINE

## 2024-07-28 PROCEDURE — 99283 EMERGENCY DEPT VISIT LOW MDM: CPT

## 2024-07-28 PROCEDURE — 99285 EMERGENCY DEPT VISIT HI MDM: CPT | Performed by: OTOLARYNGOLOGY

## 2024-07-28 RX ORDER — LIDOCAINE HYDROCHLORIDE 20 MG/ML
JELLY TOPICAL
Status: COMPLETED
Start: 2024-07-28 | End: 2024-07-28

## 2024-07-28 RX ORDER — LIDOCAINE HYDROCHLORIDE 20 MG/ML
1 JELLY TOPICAL ONCE
Status: COMPLETED | OUTPATIENT
Start: 2024-07-28 | End: 2024-07-28

## 2024-07-28 RX ADMIN — LIDOCAINE HYDROCHLORIDE 1 APPLICATION: 20 JELLY TOPICAL at 15:24

## 2024-07-28 NOTE — ED PROVIDER NOTES
"History  Chief Complaint   Patient presents with    Tracheostomy Tube Change     Pt arrives via EMS. EMS reports \"trach was pulled out this AM by pt, they replaced trach but did not inflated balloon. balloon just needs to be replaced\", \"24 hour care called with insurance and they said he did not need the balloon\", trach in place now, sating 97% on pt's own vent      39-year-old male with history of neuromuscular disorder resulting in tracheostomy and PEG placement presents to the emergency department for evaluation of a tracheostomy problem.  Per EMS the patient pulled off the inflation/deflation tubing on his cuffed tracheostomy tube.  Patient's caretaker unable to remove the tracheostomy tube for replacement so sent him here to the emergency department for evaluation.  Patient has no other concerns at this time.        Prior to Admission Medications   Prescriptions Last Dose Informant Patient Reported? Taking?   DULoxetine 40 MG CPEP   No No   Sig: Take 1 capsule (40 mg total) by mouth daily at bedtime   Incontinence Supply Disposable (Comfort Shield Adult Diapers) MISC  Family Member No No   Sig: Use 1 each 4 (four) times a day   Oral Hygiene Products (Toothette Swabs/Dentifrice) SWAB  Family Member No No   Sig: Apply to the mouth or throat 3 (three) times a day   albuterol (2.5 mg/3 mL) 0.083 % nebulizer solution  Family Member Yes No   Sig: every 8 (eight) hours   apixaban (ELIQUIS) 5 mg  Family Member No No   Si tablet (5 mg total) by Per PEG Tube route 2 (two) times a day   oxygen gas  Family Member Yes No   Sig: Inhale 6 L/min as needed   sodium chloride (BRONCHO SALINE) inhaler solution  Family Member Yes No   Sig: Take 1 spray by nebulization every 8 (eight) hours      Facility-Administered Medications: None       Past Medical History:   Diagnosis Date    Anxiety     Cancer (HCC)     Depression     Migration of percutaneous endoscopic gastrostomy (PEG) tube ([facility]) 2023    Psychiatric disorder     " "bipolar       Past Surgical History:   Procedure Laterality Date    IR BIOPSY LYMPH NODE  2023    IR GASTROSTOMY (G) TUBE CHECK/CHANGE/REINSERTION/UPSIZE  2024    IR GASTROSTOMY TUBE PLACEMENT  2023    IR LUMBAR PUNCTURE  2023    IR PORT PLACEMENT  2023    ORCHIECTOMY Left 2022    Procedure: ORCHIECTOMY INGUINAL;  Surgeon: Flip Michael MD;  Location:  MAIN OR;  Service: Urology    PEG W/TRACHEOSTOMY PLACEMENT N/A 2023    Procedure: TRACHEOSTOMY WITH INSERTION PEG TUBE;  Surgeon: Rudy Mcmanus MD;  Location: [facility] OR;  Service: General    TESTICLE SURGERY         Family History   Problem Relation Age of Onset    Coronary artery disease Maternal Aunt     Cancer Father     Diabetes Father     Hypertension Father      I have reviewed and agree with the history as documented.    E-Cigarette/Vaping    E-Cigarette Use Former User     Start Date 20     Quit Date 25      E-Cigarette/Vaping Substances    Nicotine Yes     THC Yes     CBD No     Flavoring No     Other No     Unknown No      Social History     Tobacco Use    Smoking status: Former     Current packs/day: 0.00     Average packs/day: 0.7 packs/day for 22.7 years (15.0 ttl pk-yrs)     Types: Cigarettes     Start date: 2008     Quit date: 2023     Years since quittin.1    Smokeless tobacco: Never   Vaping Use    Vaping status: Former    Start date: 2018    Quit date: 2023    Substances: Nicotine, THC   Substance Use Topics    Alcohol use: Not Currently     Comment: 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 Not currently using crack cocaine.       Review of Systems   Constitutional:  Negative for chills and fever.   HENT:  Negative for ear pain and sore throat.    Eyes:  Negative for pain and visual disturbance.   Respiratory:  Negative for cough and shortness of breath.  "   Cardiovascular:  Negative for chest pain and palpitations.   Gastrointestinal:  Negative for abdominal pain and vomiting.   Genitourinary:  Negative for dysuria and hematuria.   Musculoskeletal:  Negative for arthralgias and back pain.   Skin:  Negative for color change and rash.   Neurological:  Negative for seizures and syncope.   All other systems reviewed and are negative.      Physical Exam  Physical Exam  Vitals and nursing note reviewed.   Constitutional:       General: He is not in acute distress.     Appearance: He is well-developed.   HENT:      Head: Normocephalic and atraumatic.   Eyes:      Conjunctiva/sclera: Conjunctivae normal.   Neck:     Cardiovascular:      Rate and Rhythm: Normal rate and regular rhythm.      Heart sounds: No murmur heard.  Pulmonary:      Effort: Pulmonary effort is normal. No respiratory distress.      Breath sounds: Normal breath sounds.   Abdominal:      Palpations: Abdomen is soft.      Tenderness: There is no abdominal tenderness.   Musculoskeletal:         General: No swelling.      Cervical back: Neck supple.   Skin:     General: Skin is warm and dry.      Capillary Refill: Capillary refill takes less than 2 seconds.   Neurological:      Mental Status: He is alert.   Psychiatric:         Mood and Affect: Mood normal.               Vital Signs  ED Triage Vitals [07/28/24 1107]   Temperature Pulse Respirations Blood Pressure SpO2   98.5 °F (36.9 °C) 75 18 127/89 99 %      Temp Source Heart Rate Source Patient Position - Orthostatic VS BP Location FiO2 (%)   Axillary Monitor Lying Left arm --      Pain Score       --           Vitals:    07/28/24 1330 07/28/24 1430 07/28/24 1545 07/28/24 1743   BP: 137/56 138/92 128/88    Pulse: 58 60 65 60   Patient Position - Orthostatic VS: Lying Lying Sitting          Visual Acuity      ED Medications  Medications   lidocaine (URO-JET) 2 % urethral/mucosal gel 1 Application (1 Application Urethral Given by Other 7/28/24 3689)        Diagnostic Studies  Results Reviewed       None                   No orders to display              Procedures  Procedures         ED Course  ED Course as of 07/28/24 1906   Sun Jul 28, 2024   1145 Discussed case with the on-call general surgeon Dr. Amezcua.  He recommended contacting ENT.   1215 Contacted on-call ENT, Dr. Pritchard.  He will come to the ER to see the patient.   1424 Dr. Pritchard arrived here.  The patient's family brought in the wrong tracheostomy tube.  We do not have the right one here at this hospital.  Dr. Pritchard is going over to the [facility] to  the right tube and will be back to replace it.                     Medical Decision Making  39-year-old male presented to the emergency department for a tracheostomy problem.  On arrival patient is awake, alert, oriented and in no acute distress.  Case was discussed with the on-call surgeon who recommended contacting ENT.  ENT contacted who came to the emergency department to evaluate the patient.  The patient's family did not have the right tracheostomy tube for replacement.  The tube was also not available at this hospital.  ENT went to a different campus to get the proper replacement.  ENT replaced the tracheostomy tube here at bedside.  Patient tolerated the procedure well.  The patient is appropriate for discharge.  Return precautions were discussed.    Amount and/or Complexity of Data Reviewed  Discussion of management or test interpretation with external provider(s): Case discussed with on-call general surgery and with ENT.    Risk  Prescription drug management.                 Disposition  Final diagnoses:   Tracheostomy complication (HCC)   Encounter for tracheostomy tube change (HCC)     Time reflects when diagnosis was documented in both MDM as applicable and the Disposition within this note       Time User Action Codes Description Comment    7/28/2024  3:32 PM Magdiel Alas Add [J95.00] Tracheostomy complication  ([facility])     7/28/2024  3:39 PM Magdiel Alas Add [Z43.0] Encounter for tracheostomy tube change ([facility])           ED Disposition       ED Disposition   Discharge    Condition   Stable    Date/Time   Sun Jul 28, 2024  3:33 PM    Comment   Hemanth Swanson discharge to home/self care.                   Follow-up Information       Follow up With Specialties Details Why Contact Info Additional Information    Ela Douglas MD Family Medicine Schedule an appointment as soon as possible for a visit   [address] [facility].  [address]  [facility] [id number] [phone]       [facility] Emergency Department Emergency Medicine Go to  If symptoms worsen [address] [address] [phone] [facility] Emergency Department, [address] [place] [id number]            Discharge Medication List as of 7/28/2024  4:03 PM        CONTINUE these medications which have NOT CHANGED    Details   albuterol (2.5 mg/3 mL) 0.083 % nebulizer solution every 8 (eight) hours, Starting Mon 4/15/2024, Historical Med      apixaban (ELIQUIS) 5 mg 1 tablet (5 mg total) by Per PEG Tube route 2 (two) times a day, Starting Wed 10/18/2023, Normal      DULoxetine 40 MG CPEP Take 1 capsule (40 mg total) by mouth daily at bedtime, Starting Thu 7/11/2024, Normal      Incontinence Supply Disposable (Comfort Shield Adult Diapers) MISC Use 1 each 4 (four) times a day, Starting Wed 10/18/2023, Normal      Oral Hygiene Products (Toothette Swabs/Dentifrice) SWAB Apply to the mouth or throat 3 (three) times a day, Starting Wed 10/18/2023, Normal      oxygen gas Inhale 6 L/min as needed, Historical Med      sodium chloride (BRONCHO SALINE) inhaler solution Take 1 spray by nebulization every 8 (eight) hours, Historical Med             No discharge procedures on file.    PDMP Review       None            ED Provider  Electronically Signed by             Magdiel Alas MD  07/28/24 6682

## 2024-07-28 NOTE — ED NOTES
Pt's brief noted to be saturated. RN and techs changed pt's brief, new brief applied and bedding changed. Pt repositioned in bed with assistance of pt and ED techs. Pt able to lift own buttocks to help with repositioning. Call bell in reach, pt using it now to turn on television.      Missy Lehman RN  07/28/24 5610

## 2024-07-28 NOTE — ED NOTES
Patient's niece present at bedside for impending ALS transport. Patient's niece requested water for herself, which was then given to her. She then proceeded to ask for apple juice for the patient. Per Dr. Benoit, patient cannot have apple juice due to risk of aspiration. Patient and patient's niece made aware.      Yeimi Castillo  07/28/24 1846       Yeimi Castillo  07/28/24 6838

## 2024-07-28 NOTE — ED NOTES
Charge RN and SLETS spoke regarding transport home. SLETS requesting family member be present and ride along for transport as family is knowledgeable in pt's personal vent. RN spoke with pt's sister Dmitry. She has agreed to drop a family member off that has knowledge about pt's vent with understanding family will ride along for transport home.  Family was instructed to arrive at 1800. Tentative SLETS p/u @ 1900.     Missy Lehman RN  07/28/24 8338

## 2024-07-28 NOTE — ED NOTES
Patients family member at bedside, patient gestures to be comfortable while awaiting transportation.     Stu Torres RN  07/28/24 3509

## 2024-07-28 NOTE — CONSULTS
Consultation - ENT   Hemanth Swanson 39 y.o. male MRN: [id number]  Unit/Bed#: ED 06 Encounter: [id number]      Assessment & Plan     Assessment:  Trach tube exchanged today  Currently with: 8 cuffed Distal XLT shiley  Tracheostomy care is overseen by pulmonology reportedly  I spoke with patient sister (Dmitry) to ensure that [employer] is sending the correct trach every 3 mos for the trach change.        Plan:  Resume trach care/pulm toilet/suctioning as performed previously  Follow up pulmonology  May also see ENT (Dr. Romero or Dr. Fermin) if needed  Need 8 cuffed distal XLT shiley delivered for future trach changes  Ok for d/c from ENT standpoint    History of Present Illness   Physician Requesting Consult: Magdiel Alas MD  Reason for Consult / Principal Problem: cuffed trach malfunction  HPI: Hemanth Swanson is a 39 y.o. year old male presented to ED with the  detached from his cuffed trach tube. He remained stable however the trach needed to be replaced so that it could function optimally again.  Reportedly the patient had pulled on it.  Chronic trach for vent dependence related to encephalopathy/respiratory failure.  Per the sister, trach care is manages by pulmonology including trach changes.  She brought the new trach that was delivered to her from [employer] but it is 8 cuffed PROXIMAL XLT shiley instead of what he has currently which is a DISTAL XLT of the same size.   He remained in stable conditione on portable vent while in ED.    Inpatient consult to ENT  Consult performed by: Angel Fermin MD  Consult ordered by: Magdiel Alas MD          Review of Systems  Unable to obtain  -vent dependent    Historical Information   Past Medical History:   Diagnosis Date    Anxiety     Cancer (HCC)     Depression     Migration of percutaneous endoscopic gastrostomy (PEG) tube (HCC) 09/24/2023    Psychiatric disorder     bipolar     Past Surgical History:   Procedure Laterality Date    IR  "BIOPSY LYMPH NODE  2023    IR GASTROSTOMY (G) TUBE CHECK/CHANGE/REINSERTION/UPSIZE  2024    IR GASTROSTOMY TUBE PLACEMENT  2023    IR LUMBAR PUNCTURE  2023    IR PORT PLACEMENT  2023    ORCHIECTOMY Left 2022    Procedure: ORCHIECTOMY INGUINAL;  Surgeon: Flip Michael MD;  Location:  MAIN OR;  Service: Urology    PEG W/TRACHEOSTOMY PLACEMENT N/A 2023    Procedure: TRACHEOSTOMY WITH INSERTION PEG TUBE;  Surgeon: Rudy Mcmanus MD;  Location: [facility];  Service: General    TESTICLE SURGERY       Social History   Social History     Substance and Sexual Activity   Alcohol Use Not Currently    Comment: Former alcoholic. Last use in 2016     Social History     Substance and Sexual Activity   Drug Use Not Currently    Types: \"Crack\" cocaine, Marijuana    Comment: Current use medical marijuana. Not currently using crack cocaine.     Social History     Tobacco Use   Smoking Status Former    Current packs/day: 0.00    Average packs/day: 0.7 packs/day for 22.7 years (15.0 ttl pk-yrs)    Types: Cigarettes    Start date: 2008    Quit date: 2023    Years since quittin.1   Smokeless Tobacco Never     Family History: non-contributory    Meds/Allergies   all current active meds have been reviewed  No current facility-administered medications on file prior to encounter.     Current Outpatient Medications on File Prior to Encounter   Medication Sig Dispense Refill    albuterol (2.5 mg/3 mL) 0.083 % nebulizer solution every 8 (eight) hours      apixaban (ELIQUIS) 5 mg 1 tablet (5 mg total) by Per PEG Tube route 2 (two) times a day 60 tablet 2    DULoxetine 40 MG CPEP Take 1 capsule (40 mg total) by mouth daily at bedtime 30 capsule 2    Incontinence Supply Disposable (Comfort Shield Adult Diapers) MISC Use 1 each 4 (four) times a day 48 each 5    Oral Hygiene Products (Toothette Swabs/Dentifrice) SWAB Apply to the mouth or throat 3 (three) times a day 1000 each 1    oxygen " gas Inhale 6 L/min as needed      sodium chloride (BRONCHO SALINE) inhaler solution Take 1 spray by nebulization every 8 (eight) hours         Allergies   Allergen Reactions    Dog Epithelium Cough, Sneezing and Nasal Congestion         Objective     Vitals:    07/28/24 1300 07/28/24 1330 07/28/24 1338 07/28/24 1430   BP: 143/66 137/56  138/92   BP Location: Left arm Left arm  Left arm   Pulse: 55 58  60   Resp: 18 18  16   Temp:       TempSrc:       SpO2: 95% 98% 98% 99%       No intake or output data in the 24 hours ending 07/28/24 1536    Invasive Devices       Central Venous Catheter Line  Duration             Port A Cath 03/14/23 Right Subclavian 502 days              Drain  Duration             Gastrostomy/Enterostomy Percutaneous Interventional Gastrostomy 18 Fr. LUQ 40 days              Airway  Duration             Surgical Airway Distal;Cuffed;Other (Comment) 57 days                    Physical Exam  Gen: NAD, awake/alert, on vent; blinks/nods to questions  Head: Normocephalic/atraumatic  Face: symmetric  Neck:soft, nontender, 8 cuffed distal XLT jay in place and connected to the vent    PROCEDURE  1) tracheotomy tube exchange  2) tracheoscopy  Patient was placed in supine position  2% lidocaine jelly was applied to the skin under the flange  The current trach tube was removed and the new trach placed into the trachostoma uneventfully. Cuff was inflated, inner cannula replaced, and circuit reconnected.  Trach tie was used to secure around neck. Sats returned to %. Lowest sat during the exchange was 88% briefly.  The laryngoscope was then passed through the new tracheostomy tube.  The tip of the tube was centered within the trachea and sitting 2-3 cm above the prashant.  No blood or mucous could be visualized in the mainstem bronchi.  Scant BRB was noted under the flange after the change which resolved in <5 min.    Patient tolerated procedures well without complications.    Lab Results: I have  personally reviewed pertinent lab results.    Imaging Studies:   EKG, Pathology, and Other Studies:     Code Status: Prior  Advance Directive and Living Will:      Power of :    POLST:      I spent 2 hours including assessment and coordination of care - obtaining correct tracheotomy tube to replace

## 2024-07-29 ENCOUNTER — TELEPHONE (OUTPATIENT)
Age: 39
End: 2024-07-29

## 2024-07-29 ENCOUNTER — TELEPHONE (OUTPATIENT)
Dept: FAMILY MEDICINE CLINIC | Facility: CLINIC | Age: 39
End: 2024-07-29

## 2024-07-29 NOTE — TELEPHONE ENCOUNTER
Charley calls from [employer] and is following up on fax that was sent to us this morning with new script for pt's trach. Informed her that we have not received it and ave her alternate fax number. Charley states she will re fax the script in hopes we will receive it.

## 2024-07-30 ENCOUNTER — TELEPHONE (OUTPATIENT)
Age: 39
End: 2024-07-30

## 2024-07-30 NOTE — TELEPHONE ENCOUNTER
Incoming call:    Charley calling from [employer]: Resending script with Trach size/description. States that it was cut off in fax.

## 2024-08-06 ENCOUNTER — TELEPHONE (OUTPATIENT)
Age: 39
End: 2024-08-06

## 2024-08-06 NOTE — TELEPHONE ENCOUNTER
Patient's sister calling asking is tomorrow's 2:20 appointment can be a virtual appointment    Patient's sister asking for a call back

## 2024-08-07 ENCOUNTER — TELEMEDICINE (OUTPATIENT)
Dept: PULMONOLOGY | Facility: CLINIC | Age: 39
End: 2024-08-07
Payer: COMMERCIAL

## 2024-08-07 DIAGNOSIS — J96.11 CHRONIC RESPIRATORY FAILURE WITH HYPOXIA AND HYPERCAPNIA (HCC): ICD-10-CM

## 2024-08-07 DIAGNOSIS — I51.7 LVH (LEFT VENTRICULAR HYPERTROPHY): Chronic | ICD-10-CM

## 2024-08-07 DIAGNOSIS — Z93.0 STATUS POST TRACHEOSTOMY (HCC): ICD-10-CM

## 2024-08-07 DIAGNOSIS — Z99.11 VENTILATOR DEPENDENT (HCC): Primary | Chronic | ICD-10-CM

## 2024-08-07 DIAGNOSIS — J96.12 CHRONIC RESPIRATORY FAILURE WITH HYPOXIA AND HYPERCAPNIA (HCC): ICD-10-CM

## 2024-08-07 DIAGNOSIS — I26.99 OTHER ACUTE PULMONARY EMBOLISM WITHOUT ACUTE COR PULMONALE (HCC): ICD-10-CM

## 2024-08-07 DIAGNOSIS — I46.8 CARDIAC ARREST DUE TO OTHER UNDERLYING CONDITION (HCC): ICD-10-CM

## 2024-08-07 PROCEDURE — 99214 OFFICE O/P EST MOD 30 MIN: CPT | Performed by: STUDENT IN AN ORGANIZED HEALTH CARE EDUCATION/TRAINING PROGRAM

## 2024-08-07 NOTE — PROGRESS NOTES
Virtual Regular Visit  Name: Hemanth Swanson      : 1985      MRN: [id number]  Encounter Provider: Yessy Barksdale MD  Encounter Date: 2024   Encounter department: [facility] PULMONARY [facility]    Verification of patient location:    Patient is located at Home in the following state in which I hold an active license [place]    Assessment & Plan   1. Ventilator dependent (HCC)  2. History of LVH (left ventricular hypertrophy)  3. Other acute pulmonary embolism without acute cor pulmonale (HCC)  4. Cardiac arrest due to other underlying condition (HCC)  5. Chronic respiratory failure with hypoxia and hypercapnia (HCC)  6. Status post tracheostomy (HCC)        Encounter provider Yessy Barksdale MD    The patient was identified by name and date of birth. Hemanth Swanson was informed that this is a telemedicine visit and that the visit is being conducted through the Epic Embedded platform. He agrees to proceed..  My office door was closed. Other methods to assure confidentiality were taken such as ear phones. No one else was in the room.  He acknowledged consent and understanding of privacy and security of the video platform. The patient has agreed to participate and understands they can discontinue the visit at any time.    Patient is aware this is a billable service.     History of Present Illness     Hemanth Swanson is a 39 y.o. male who presents     Since our last visit Hemanth has been on full support.  I have spoken to his respiratory therapist several times, most recently when he uses albuterol he has major side effects therefore his sister stopped using it.  She continues to use saline bullet lavage.    He is also slowly trying to try solid foods and still continues with to be donated    Hemanth recently spoke with neurology and they felt that there was no neuro component to his suspected seizure activity.  He will be seeing urology soon and hopefully they can address his testosterone issue.    Since  his ICU hospitalization he has not taken his Cymbalta so I remove this from his medication list.  He continues with his Eliquis.  He has not been very active still requires a Wallace lift and occasionally stands but he is not ambulating.    He was recently in the hospital/ER where ENT adjusted his tracheostomy tube he is supposed to have a 8 distal Shiley.  The orders were placed in epic to update/reflect this.        Review of Systems   Constitutional:  Positive for activity change and appetite change.   HENT: Negative.     Eyes: Negative.    Respiratory:  Positive for cough and shortness of breath.    Cardiovascular: Negative.    Gastrointestinal: Negative.    Endocrine: Negative.    Genitourinary: Negative.    Musculoskeletal:  Positive for gait problem.   Skin: Negative.    Allergic/Immunologic: Negative.    Neurological:  Positive for seizures and speech difficulty.   Hematological: Negative.    Psychiatric/Behavioral: Negative.       Pertinent Medical History         Objective     There were no vitals taken for this visit.  Physical Exam  Musculoskeletal:         General: Normal range of motion.   Neurological:      General: No focal deficit present.      Mental Status: He is alert. Mental status is at baseline.         Visit Time  Total Visit Duration: 20 minutes

## 2024-08-07 NOTE — PATIENT INSTRUCTIONS
It was a pleasure seeing you today!    Stop albuterol   Use saline nebulizer every 8 hours  Continue tube feeds as needed, sometimes ok for solid foods  Consider second opinion for neurology  See urology soon  OK to hold on Cymbalta   Keep using John Barksdale MD  Pulmonary and Critical Care Medicine

## 2024-08-21 ENCOUNTER — OFFICE VISIT (OUTPATIENT)
Dept: UROLOGY | Facility: CLINIC | Age: 39
End: 2024-08-21
Payer: COMMERCIAL

## 2024-08-21 VITALS — DIASTOLIC BLOOD PRESSURE: 80 MMHG | SYSTOLIC BLOOD PRESSURE: 100 MMHG | OXYGEN SATURATION: 97 % | HEART RATE: 57 BPM

## 2024-08-21 DIAGNOSIS — C62.92 SEMINOMA OF LEFT TESTIS (HCC): Chronic | ICD-10-CM

## 2024-08-21 DIAGNOSIS — E29.1 HYPOGONADISM IN MALE: Primary | ICD-10-CM

## 2024-08-21 DIAGNOSIS — R79.89 LOW TESTOSTERONE: ICD-10-CM

## 2024-08-21 PROCEDURE — 99213 OFFICE O/P EST LOW 20 MIN: CPT | Performed by: PHYSICIAN ASSISTANT

## 2024-08-21 RX ORDER — TESTOSTERONE CYPIONATE 200 MG/ML
100 INJECTION, SOLUTION INTRAMUSCULAR 2 TIMES WEEKLY
Qty: 10 ML | Refills: 3 | Status: SHIPPED | OUTPATIENT
Start: 2024-08-22

## 2024-08-21 NOTE — PROGRESS NOTES
UROLOGY PROGRESS NOTE   Patient Identifiers: Hemanth Swanson (MRN [id number])  Date of Service: 8/21/2024    Subjective:   39-year-old male with history of stage II left testicular seminoma.  He had an orchiectomy in December 2022.  He has not been seen since January 2023 and unfortunately developed a undiagnosed neuromuscular problem which left him ventilator dependent.  He is in a wheelchair with a feeding tube brought in by his mother and caregiver.  He was noted to have a testosterone that was at castrate levels of 19.  Request by his family and by the critical care service to evaluate for testosterone replacement to alleviate symptoms of hot flashes and profuse sweating.    Reason for visit: Male hypogonadism    Objective:     VITALS:    Vitals:    08/21/24 1251   BP: 100/80   Pulse: 57   SpO2: 97%     AUA SYMPTOM SCORE      Flowsheet Row Most Recent Value   AUA SYMPTOM SCORE    How often have you had a sensation of not emptying your bladder completely after you finished urinating? 0 (P)     How often have you had to urinate again less than two hours after you finished urinating? 0 (P)     How often have you found you stopped and started again several times when you urinate? 0 (P)     How often have you found it difficult to postpone urination? 0 (P)     How often have you had a weak urinary stream? 0 (P)     How often have you had to push or strain to begin urination? 0 (P)     How many times did you most typically get up to urinate from the time you went to bed at night until the time you got up in the morning? 0 (P)     Quality of Life: If you were to spend the rest of your life with your urinary condition just the way it is now, how would you feel about that? 5 (P)     AUA SYMPTOM SCORE 0 (P)                LABS:  Lab Results   Component Value Date    HGB 12.4 06/22/2024    HCT 39.7 06/22/2024    WBC 9.18 06/22/2024     06/22/2024   ]    Lab Results   Component Value Date    K 4.2 06/22/2024    CL  "101 06/22/2024    CO2 33 (H) 06/22/2024    BUN 15 06/22/2024    CREATININE 0.53 (L) 06/22/2024    CALCIUM 9.7 06/22/2024    GLUCOSE 184 (H) 05/31/2024   ]        INPATIENT MEDS:    Current Outpatient Medications:     apixaban (ELIQUIS) 5 mg, 1 tablet (5 mg total) by Per PEG Tube route 2 (two) times a day, Disp: 60 tablet, Rfl: 2    Incontinence Supply Disposable (Comfort Shield Adult Diapers) MISC, Use 1 each 4 (four) times a day, Disp: 48 each, Rfl: 5    [START ON 8/22/2024] NEEDLE, DISP, 18 G 18G X 1\" MISC, Use 2 (two) times a week, Disp: 50 each, Rfl: 3    Oral Hygiene Products (Toothette Swabs/Dentifrice) SWAB, Apply to the mouth or throat 3 (three) times a day, Disp: 1000 each, Rfl: 1    oxygen gas, Inhale 6 L/min as needed, Disp: , Rfl:     sodium chloride (BRONCHO SALINE) inhaler solution, Take 1 spray by nebulization every 8 (eight) hours, Disp: , Rfl:     [START ON 8/22/2024] Syringe/Needle, Disp, (Syringe Luer Slip) 25G X 5/8\" 1 ML MISC, Use 2 (two) times a week, Disp: 50 each, Rfl: 3    [START ON 8/22/2024] testosterone cypionate (DEPO-TESTOSTERONE) 200 mg/mL SOLN, Inject 0.5 mL (100 mg total) into a muscle 2 (two) times a week, Disp: 10 mL, Rfl: 3      Physical Exam:   /80 (BP Location: Left arm, Patient Position: Sitting, Cuff Size: Standard)   Pulse 57   SpO2 97%   GEN: no acute distress    RESP: breathing comfortably with no accessory muscle use    ABD: soft, non-tender, non-distended   INCISION:    EXT: no significant peripheral edema     RADIOLOGY:   IMPRESSION:     No pulmonary embolism.     Dense consolidation in the lower lobes reidentified may represent chronic atelectasis.     New groundglass mixed with alveolar opacities in the remainder of the lung fields most severe in the left upper lobe consistent with multi lobar pneumonia, aspiration not excluded.     Interval development of mild multilevel superior plate compression deformities at T11-L2 with sclerosis suggesting chronic " deformities. Correlate for focal back tenderness     Assessment:   #1.  Male hypogonadism  #2.  Left testicular seminoma status post orchiectomy  #3.  Ventilator dependent respiratory failure    Plan:   -I reviewed the findings and the options of management with the patient his mother and caregiver  -I will start him on testosterone cypionate 100 mg twice a week  -Hopefully this will alleviate some of his symptoms he has been experiencing  -I would like to do a virtual appointment in about 3 months and discuss his labs I also ordered his testicular cancer tumor markers  -Recent CAT scans have not shown evidence any evidence of recurrent disease  -He may need a referral to endocrinology for further metabolic testing if  the addition of exogenous testosterone does not improve his symptoms

## 2024-08-22 ENCOUNTER — TELEPHONE (OUTPATIENT)
Age: 39
End: 2024-08-22

## 2024-08-22 NOTE — TELEPHONE ENCOUNTER
PA for TESTOSTERONE CYP.200 SUBMITTED     via    []M-KEY:   [x][facility] [id number]   []Faxed to plan   []Other website   []Phone call Case ID #     Office notes sent, clinical questions answered. Awaiting determination    Turnaround time for your insurance to make a decision on your Prior Authorization can take 7-21 business days.

## 2024-08-22 NOTE — TELEPHONE ENCOUNTER
Patients sister Dmitry calling, she went to get Rx for testosterone and is asking the status of it.    Advised that a PA has been started and that the turn around time is 7-21 business days    Also informed her the next OV to complete a CC form

## 2024-08-23 NOTE — TELEPHONE ENCOUNTER
PA for TESTOSTERONE APPROVED     Date(s) approved  August 22, 2024 to August 22, 2025       Patient advised by          [x] MyChart Message  [] Phone call   []LMOM  []L/M to call office as no active Communication consent on file  [x]Unable to leave detailed message as VM not approved on Communication consent       Pharmacy advised by    [x]Fax  []Phone call    Approval letter scanned into Media Yes

## 2024-08-24 ENCOUNTER — HOSPITAL ENCOUNTER (EMERGENCY)
Facility: HOSPITAL | Age: 39
Discharge: NURSING, REHAB OR LONG-TERM CARE | End: 2024-08-24
Attending: EMERGENCY MEDICINE
Payer: COMMERCIAL

## 2024-08-24 VITALS
SYSTOLIC BLOOD PRESSURE: 121 MMHG | HEART RATE: 51 BPM | RESPIRATION RATE: 13 BRPM | OXYGEN SATURATION: 97 % | TEMPERATURE: 97 F | DIASTOLIC BLOOD PRESSURE: 78 MMHG

## 2024-08-24 DIAGNOSIS — Z99.11 VENTILATOR DEPENDENT (HCC): Primary | Chronic | ICD-10-CM

## 2024-08-24 DIAGNOSIS — Z43.0 ENCOUNTER FOR TRACHEOSTOMY TUBE CHANGE (HCC): ICD-10-CM

## 2024-08-24 PROCEDURE — 94760 N-INVAS EAR/PLS OXIMETRY 1: CPT

## 2024-08-24 PROCEDURE — 31502 CHANGE OF WINDPIPE AIRWAY: CPT

## 2024-08-24 PROCEDURE — 99284 EMERGENCY DEPT VISIT MOD MDM: CPT | Performed by: EMERGENCY MEDICINE

## 2024-08-24 PROCEDURE — 99284 EMERGENCY DEPT VISIT MOD MDM: CPT

## 2024-08-24 NOTE — ED PROVIDER NOTES
"History  Chief Complaint   Patient presents with    Medical Problem     Pt from group home. Pt has pulled trach loose and needs trach replaced      39-year-old male presents from his home for a malfunction of his tracheostomy tube, they report that the cuff connection was severed, and they sent in a replacement for him to have exchanged.  History and review of systems are limited by the patient's baseline mental status.  The patient shakes his head denying any other current issues and is able to say his name only.        Prior to Admission Medications   Prescriptions Last Dose Informant Patient Reported? Taking?   Incontinence Supply Disposable (Comfort Shield Adult Diapers) MISC  Family Member No No   Sig: Use 1 each 4 (four) times a day   NEEDLE, DISP, 18 G 18G X 1\" MISC   No No   Sig: Use 2 (two) times a week   Oral Hygiene Products (Toothette Swabs/Dentifrice) SWAB  Family Member No No   Sig: Apply to the mouth or throat 3 (three) times a day   Syringe/Needle, Disp, (Syringe Luer Slip) 25G X 5/8\" 1 ML MISC   No No   Sig: Use 2 (two) times a week   apixaban (ELIQUIS) 5 mg  Family Member No No   Si tablet (5 mg total) by Per PEG Tube route 2 (two) times a day   oxygen gas  Family Member Yes No   Sig: Inhale 6 L/min as needed   sodium chloride (BRONCHO SALINE) inhaler solution  Family Member Yes No   Sig: Take 1 spray by nebulization every 8 (eight) hours   testosterone cypionate (DEPO-TESTOSTERONE) 200 mg/mL SOLN   No No   Sig: Inject 0.5 mL (100 mg total) into a muscle 2 (two) times a week      Facility-Administered Medications: None       Past Medical History:   Diagnosis Date    Anxiety     Cancer (HCC)     Depression     Migration of percutaneous endoscopic gastrostomy (PEG) tube (HCC) 2023    Psychiatric disorder     bipolar       Past Surgical History:   Procedure Laterality Date    IR BIOPSY LYMPH NODE  2023    IR GASTROSTOMY (G) TUBE CHECK/CHANGE/REINSERTION/UPSIZE  2024    IR " "GASTROSTOMY TUBE PLACEMENT  2023    IR LUMBAR PUNCTURE  2023    IR PORT PLACEMENT  2023    ORCHIECTOMY Left 2022    Procedure: ORCHIECTOMY INGUINAL;  Surgeon: Flip Michael MD;  Location:  MAIN OR;  Service: Urology    PEG W/TRACHEOSTOMY PLACEMENT N/A 2023    Procedure: TRACHEOSTOMY WITH INSERTION PEG TUBE;  Surgeon: Rudy Mcmanus MD;  Location: [facility];  Service: General    TESTICLE SURGERY         Family History   Problem Relation Age of Onset    Coronary artery disease Maternal Aunt     Cancer Father     Diabetes Father     Hypertension Father      I have reviewed and agree with the history as documented.    E-Cigarette/Vaping    E-Cigarette Use Former User     Start Date 20     Quit Date 25      E-Cigarette/Vaping Substances    Nicotine Yes     THC Yes     CBD No     Flavoring No     Other No     Unknown No      Social History     Tobacco Use    Smoking status: Former     Current packs/day: 0.00     Average packs/day: 0.7 packs/day for 22.7 years (15.0 ttl pk-yrs)     Types: Cigarettes     Start date: 2008     Quit date: 2023     Years since quittin.2    Smokeless tobacco: Never   Vaping Use    Vaping status: Former    Start date: 2018    Quit date: 2023    Substances: Nicotine, THC   Substance Use Topics    Alcohol use: Not Currently     Comment: Former alcoholic. Last use in     Drug use: Not Currently     Types: \"Crack\" cocaine, Marijuana     Comment: Current use medical marijuana. Not currently using crack cocaine.       Review of Systems   Unable to perform ROS: Patient nonverbal       Physical Exam  Physical Exam  Vitals and nursing note reviewed.   Constitutional:       General: He is not in acute distress.     Appearance: Normal appearance. He is obese.   HENT:      Head: Normocephalic and atraumatic.      Right Ear: External ear normal.      Left Ear: External ear normal.      Mouth/Throat:      Mouth: Mucous membranes are " moist.      Pharynx: Oropharynx is clear.   Eyes:      Conjunctiva/sclera: Conjunctivae normal.      Pupils: Pupils are equal, round, and reactive to light.   Cardiovascular:      Rate and Rhythm: Normal rate and regular rhythm.      Heart sounds: Normal heart sounds.   Pulmonary:      Effort: Pulmonary effort is normal. No respiratory distress.      Breath sounds: Normal breath sounds.      Comments: Tracheostomy in place in anterior neck  Abdominal:      General: Abdomen is flat. There is no distension.   Musculoskeletal:         General: No deformity. Normal range of motion.      Cervical back: Normal range of motion.   Skin:     General: Skin is warm and dry.   Neurological:      General: No focal deficit present.      Mental Status: He is alert.      Comments: Oriented to person, otherwise nonverbal   Psychiatric:         Mood and Affect: Mood normal.         Behavior: Behavior normal.         Vital Signs  ED Triage Vitals   Temperature Pulse Respirations Blood Pressure SpO2   08/24/24 1910 08/24/24 1910 08/24/24 1910 08/24/24 1910 08/24/24 1912   (!) 96.1 °F (35.6 °C) 55 (!) 24 108/73 98 %      Temp Source Heart Rate Source Patient Position - Orthostatic VS BP Location FiO2 (%)   08/24/24 1910 08/24/24 1910 08/24/24 1910 08/24/24 1910 --   Axillary Monitor Lying Right arm       Pain Score       --                  Vitals:    08/24/24 1910 08/24/24 2000 08/24/24 2100   BP: 108/73 120/84 121/78   Pulse: 55 (!) 51 (!) 51   Patient Position - Orthostatic VS: Lying  Lying         Visual Acuity      ED Medications  Medications - No data to display    Diagnostic Studies  Results Reviewed       None                   No orders to display              Procedures  Procedures         ED Course           Medical Decision Making  39-year-old male presents from a group home for a tracheostomy exchange after his current tracheostomy tube was damaged, he has the supplies with him, his tube will be exchanged and he will be  "discharged back to his group home.    The patient's tracheostomy was replaced successfully by respiratory therapy, with minimal bleeding, the patient is safe for discharge home back to his group home and will be encouraged to follow-up with primary care as an outpatient for reevaluation.                 Disposition  Final diagnoses:   Encounter for tracheostomy tube change (HCC)     Time reflects when diagnosis was documented in both MDM as applicable and the Disposition within this note       Time User Action Codes Description Comment    8/24/2024  8:24 PM Cathie Rosas Add [Z99.11] Ventilator dependent (HCC)     8/24/2024  8:27 PM Kyle Gagnon Add [Z43.0] Encounter for tracheostomy tube change (HCC)           ED Disposition       ED Disposition   Discharge    Condition   Stable    Date/Time   Sat Aug 24, 2024  8:26 PM    Comment   Hemanth Swanson discharge to home/self care.                   Follow-up Information       Follow up With Specialties Details Why Contact Info Additional Information    Ela Douglas MD Family Medicine Schedule an appointment as soon as possible for a visit in 3 days For follow-up [address] [facility] [facility].  [address]  [facility] [address]  [phone]       [facility] Emergency Department Emergency Medicine Go to  As needed [address] [address] [phone] [facility] Emergency Department, [address] [address]            Discharge Medication List as of 8/24/2024  8:27 PM        CONTINUE these medications which have NOT CHANGED    Details   apixaban (ELIQUIS) 5 mg 1 tablet (5 mg total) by Per PEG Tube route 2 (two) times a day, Starting Wed 10/18/2023, Normal      Incontinence Supply Disposable (Comfort Shield Adult Diapers) MISC Use 1 each 4 (four) times a day, Starting Wed 10/18/2023, Normal      NEEDLE, DISP, 18 G 18G X 1\" MISC Use 2 (two) times a week, Starting Thu 8/22/2024, Normal      Oral Hygiene " "Products (Toothette Swabs/Dentifrice) SWAB Apply to the mouth or throat 3 (three) times a day, Starting Wed 10/18/2023, Normal      oxygen gas Inhale 6 L/min as needed, Historical Med      sodium chloride (BRONCHO SALINE) inhaler solution Take 1 spray by nebulization every 8 (eight) hours, Historical Med      Syringe/Needle, Disp, (Syringe Luer Slip) 25G X 5/8\" 1 ML MISC Use 2 (two) times a week, Starting Thu 8/22/2024, Normal      testosterone cypionate (DEPO-TESTOSTERONE) 200 mg/mL SOLN Inject 0.5 mL (100 mg total) into a muscle 2 (two) times a week, Starting Thu 8/22/2024, Normal             No discharge procedures on file.    PDMP Review       None            ED Provider  Electronically Signed by             Kyle Gagnon MD  08/25/24 0550    "

## 2024-09-06 ENCOUNTER — HOSPITAL ENCOUNTER (EMERGENCY)
Facility: HOSPITAL | Age: 39
Discharge: HOME | End: 2024-09-06
Attending: EMERGENCY MEDICINE
Payer: COMMERCIAL

## 2024-09-06 VITALS
DIASTOLIC BLOOD PRESSURE: 63 MMHG | SYSTOLIC BLOOD PRESSURE: 103 MMHG | TEMPERATURE: 97.6 F | OXYGEN SATURATION: 98 % | HEART RATE: 59 BPM | RESPIRATION RATE: 39 BRPM

## 2024-09-06 DIAGNOSIS — J95.09 OTHER TRACHEOSTOMY COMPLICATION (HCC): Primary | ICD-10-CM

## 2024-09-06 DIAGNOSIS — Z43.0 ENCOUNTER FOR TRACHEOSTOMY TUBE CHANGE (HCC): ICD-10-CM

## 2024-09-06 PROCEDURE — 99284 EMERGENCY DEPT VISIT MOD MDM: CPT

## 2024-09-06 NOTE — ED NOTES
[facility] Transport set up using [facility]. Awaiting  time. Pt's family made aware of pt's discharge.     Jarvis Guzman RN  09/06/24 0662

## 2024-09-06 NOTE — RESPIRATORY THERAPY NOTE
Pt brought in by EMS. Pt pulled off his cuff line and balloon. Trach changed out for a cuffed Shiley XLT by Dr. Garcia. Pt saturating well. Pt has good Tidal volumes on his personal vent.

## 2024-09-06 NOTE — ED NOTES
Patient D/C prior to my shift, transportation arrived for the patient . Caregiver at bedside with patient.       Aurelia Yusuf RN  09/06/24 4616

## 2024-09-07 NOTE — ED PROVIDER NOTES
"History  Chief Complaint   Patient presents with    Tracheostomy Tube Evaluation     Patient brought in by EMS from home after removing a portion attached to the balloon of his tracheostomy tube. Tracheostomy tube currently functioning properly. Respiratory Therapy at bedside to assist.      39-year-old male tree of testicular cancer, neuromuscular disorder requiring patient to be ventilator dependent with a tracheostomy presents after accidentally pulling off the balloon port of his trach earlier today.  Patient is unable to provide further history.        Prior to Admission Medications   Prescriptions Last Dose Informant Patient Reported? Taking?   Incontinence Supply Disposable (Comfort Shield Adult Diapers) MISC  Family Member No No   Sig: Use 1 each 4 (four) times a day   NEEDLE, DISP, 18 G 18G X 1\" MISC   No No   Sig: Use 2 (two) times a week   Oral Hygiene Products (Toothette Swabs/Dentifrice) SWAB  Family Member No No   Sig: Apply to the mouth or throat 3 (three) times a day   Syringe/Needle, Disp, (Syringe Luer Slip) 25G X 5/8\" 1 ML MISC   No No   Sig: Use 2 (two) times a week   apixaban (ELIQUIS) 5 mg  Family Member No No   Si tablet (5 mg total) by Per PEG Tube route 2 (two) times a day   oxygen gas  Family Member Yes No   Sig: Inhale 6 L/min as needed   sodium chloride (BRONCHO SALINE) inhaler solution  Family Member Yes No   Sig: Take 1 spray by nebulization every 8 (eight) hours   testosterone cypionate (DEPO-TESTOSTERONE) 200 mg/mL SOLN   No No   Sig: Inject 0.5 mL (100 mg total) into a muscle 2 (two) times a week      Facility-Administered Medications: None       Past Medical History:   Diagnosis Date    Anxiety     Cancer (HCC)     Depression     Migration of percutaneous endoscopic gastrostomy (PEG) tube (HCC) 2023    Psychiatric disorder     bipolar       Past Surgical History:   Procedure Laterality Date    IR BIOPSY LYMPH NODE  2023    IR GASTROSTOMY (G) TUBE " "CHECK/CHANGE/REINSERTION/UPSIZE  2024    IR GASTROSTOMY TUBE PLACEMENT  2023    IR LUMBAR PUNCTURE  2023    IR PORT PLACEMENT  2023    ORCHIECTOMY Left 2022    Procedure: ORCHIECTOMY INGUINAL;  Surgeon: Flip Michael MD;  Location:  MAIN OR;  Service: Urology    PEG W/TRACHEOSTOMY PLACEMENT N/A 2023    Procedure: TRACHEOSTOMY WITH INSERTION PEG TUBE;  Surgeon: Rudy Mcmanus MD;  Location: [facility];  Service: General    TESTICLE SURGERY         Family History   Problem Relation Age of Onset    Coronary artery disease Maternal Aunt     Cancer Father     Diabetes Father     Hypertension Father      I have reviewed and agree with the history as documented.    E-Cigarette/Vaping    E-Cigarette Use Former User     Start Date 20     Quit Date 25      E-Cigarette/Vaping Substances    Nicotine Yes     THC Yes     CBD No     Flavoring No     Other No     Unknown No      Social History     Tobacco Use    Smoking status: Former     Current packs/day: 0.00     Average packs/day: 0.7 packs/day for 22.7 years (15.0 ttl pk-yrs)     Types: Cigarettes     Start date: 2008     Quit date: 2023     Years since quittin.2    Smokeless tobacco: Never   Vaping Use    Vaping status: Former    Start date: 2018    Quit date: 2023    Substances: Nicotine, THC   Substance Use Topics    Alcohol use: Not Currently     Comment: 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 Not currently using crack cocaine.       Review of Systems   Unable to perform ROS: Patient nonverbal       Physical Exam  Physical Exam  Constitutional:       Comments: Eyes open, intermittently rolled back, minimally interactive   HENT:      Head: Atraumatic.   Neck:      Comments: Shiley XLT distal in place, no visible balloon port  Cardiovascular:      Rate and Rhythm: Normal rate and regular rhythm. "   Pulmonary:      Effort: Pulmonary effort is normal.      Breath sounds: Normal breath sounds.      Comments: On a ventilator on pressure control  Skin:     General: Skin is warm and dry.   Neurological:      Mental Status: He is alert. Mental status is at baseline.         Vital Signs  ED Triage Vitals [09/06/24 1643]   Temperature Pulse Respirations Blood Pressure SpO2   97.6 °F (36.4 °C) 73 18 103/63 99 %      Temp Source Heart Rate Source Patient Position - Orthostatic VS BP Location FiO2 (%)   Axillary Monitor Lying Left arm --      Pain Score       --           Vitals:    09/06/24 1643 09/06/24 1715   BP: 103/63    Pulse: 73 59   Patient Position - Orthostatic VS: Lying          Visual Acuity      ED Medications  Medications - No data to display    Diagnostic Studies  Results Reviewed       None                   No orders to display              Procedures  Tracheostomy    Date/Time: 9/6/2024 8:27 PM    Performed by: Starr Garcia MD  Authorized by: Starr Garcia MD    Patient Location:  ED  Other Assisting Provider: Yes (comment) (Marcos Weller RT)    Emergent situation    Patient identity confirmed:  Provided demographic data  Indications:  Malfunction  Procedure type:  Tube change  Tracheostomy status: Fistula tract established?: Yes    Tube type:  Single cannula  Tube cuff:  Single cuff  Approach:  Transtracheal  Approach location:  Transtracheal  Cuff inflation:  Inflated  Complications: bleeding    Patient tolerance:  Patient tolerated the procedure well with no immediate complications   Patient tracheostomy replaced with Shiley XLT distal cuffed trach provided by patient.  Following the procedure patient continued to be oxygenating at 99% at baseline settings.  Small amount of superficial bleeding which stopped promptly.  CriticalCare Time    Date/Time: 9/6/2024 8:32 PM    Performed by: Starr Garcia MD  Authorized by: Starr Garcia MD    Critical care provider statement:      Critical care time (minutes):  30    Critical care time was exclusive of:  Separately billable procedures and treating other patients and teaching time    Critical care was necessary to treat or prevent imminent or life-threatening deterioration of the following conditions:  Respiratory failure    Critical care was time spent personally by me on the following activities:  Obtaining history from patient or surrogate, evaluation of patient's response to treatment, examination of patient, re-evaluation of patient's condition, review of old charts and ventilator management           ED Course       39-year-old male with neuromuscular disorder vent dependent with a trach presents after pulling the balloon port off his trach.  Patient arrives on a ventilator initially without a significant cuff leak however developed increasing cuff leak with tidal volumes decreasing to the mid 100s.  Maintaining oxygen saturation of 99%.  Concerns about time sensitivity of cuff leak I elected to exchange the patient's trach myself as opposed to waiting for ENT which may have taken multiple hours.  Patient arrived with a replacement trach which matched the trach he is currently using.  The tube was exchanged over a bougie.  Following the exchange patient continued to be oxygenating well with appropriate pressures and tidal volumes on the ventilator.  Patient transported home with family member.                          SBIRT 20yo+      Flowsheet Row Most Recent Value   Initial Alcohol Screen: US AUDIT-C     1. How often do you have a drink containing alcohol? 0 Filed at: 09/06/2024 1644   2. How many drinks containing alcohol do you have on a typical day you are drinking?  0 Filed at: 09/06/2024 1644   3a. Male UNDER 65: How often do you have five or more drinks on one occasion? 0 Filed at: 09/06/2024 1644   Audit-C Score 0 Filed at: 09/06/2024 1644   JEAN MARIE: How many times in the past year have you...    Used an illegal drug or used a  "prescription medication for non-medical reasons? Never Filed at: 09/06/2024 1644                      Medical Decision Making               Disposition  Final diagnoses:   Other tracheostomy complication (HCC)   Encounter for tracheostomy tube change (HCC)     Time reflects when diagnosis was documented in both MDM as applicable and the Disposition within this note       Time User Action Codes Description Comment    9/6/2024  5:08 PM Starr Garcia [J95.09] Other tracheostomy complication (HCC)     9/6/2024  5:09 PM Starr Garcia [Z43.0] Encounter for tracheostomy tube change (HCC)           ED Disposition       ED Disposition   Discharge    Condition   Stable    Date/Time   Fri Sep 6, 2024 1709    Comment   Hemanth Swanson discharge to home/self care.                   Follow-up Information    None         Discharge Medication List as of 9/6/2024  5:17 PM        CONTINUE these medications which have NOT CHANGED    Details   apixaban (ELIQUIS) 5 mg 1 tablet (5 mg total) by Per PEG Tube route 2 (two) times a day, Starting Wed 10/18/2023, Normal      Incontinence Supply Disposable (Comfort Shield Adult Diapers) MISC Use 1 each 4 (four) times a day, Starting Wed 10/18/2023, Normal      NEEDLE, DISP, 18 G 18G X 1\" MISC Use 2 (two) times a week, Starting Thu 8/22/2024, Normal      Oral Hygiene Products (Toothette Swabs/Dentifrice) SWAB Apply to the mouth or throat 3 (three) times a day, Starting Wed 10/18/2023, Normal      oxygen gas Inhale 6 L/min as needed, Historical Med      sodium chloride (BRONCHO SALINE) inhaler solution Take 1 spray by nebulization every 8 (eight) hours, Historical Med      Syringe/Needle, Disp, (Syringe Luer Slip) 25G X 5/8\" 1 ML MISC Use 2 (two) times a week, Starting Thu 8/22/2024, Normal      testosterone cypionate (DEPO-TESTOSTERONE) 200 mg/mL SOLN Inject 0.5 mL (100 mg total) into a muscle 2 (two) times a week, Starting Thu 8/22/2024, Normal             No discharge procedures on " file.    PDMP Review       None            ED Provider  Electronically Signed by             Starr Garcia MD  09/06/24 3554

## 2024-09-08 NOTE — ASSESSMENT & PLAN NOTE
· Home regimen Seroquel 25 mg and fluoxetine 10 mg daily. Patient reports being compliant with medication but girlfriend disagrees. · Plan:  · Resume home medications. · Encourage patient to be compliant with pharmacotherapy outpatient. PAST MEDICAL HISTORY:  No pertinent past medical history

## 2024-10-04 ENCOUNTER — TELEPHONE (OUTPATIENT)
Age: 39
End: 2024-10-04

## 2024-10-05 ENCOUNTER — HOSPITAL ENCOUNTER (EMERGENCY)
Facility: HOSPITAL | Age: 39
End: 2024-10-05
Attending: INTERNAL MEDICINE
Payer: COMMERCIAL

## 2024-10-05 ENCOUNTER — HOSPITAL ENCOUNTER (INPATIENT)
Facility: HOSPITAL | Age: 39
LOS: 26 days | Discharge: HOME HEALTH | DRG: 720 | End: 2024-10-31
Attending: INTERNAL MEDICINE | Admitting: INTERNAL MEDICINE
Payer: COMMERCIAL

## 2024-10-05 ENCOUNTER — APPOINTMENT (EMERGENCY)
Dept: RADIOLOGY | Facility: HOSPITAL | Age: 39
End: 2024-10-05
Payer: COMMERCIAL

## 2024-10-05 DIAGNOSIS — Z99.11 VENTILATOR DEPENDENT (HCC): Primary | ICD-10-CM

## 2024-10-05 DIAGNOSIS — J96.21 ACUTE ON CHRONIC HYPOXIC RESPIRATORY FAILURE (HCC): Primary | ICD-10-CM

## 2024-10-05 DIAGNOSIS — J96.12 CHRONIC RESPIRATORY FAILURE WITH HYPOXIA AND HYPERCAPNIA (HCC): ICD-10-CM

## 2024-10-05 DIAGNOSIS — A41.9 SEPSIS (HCC): ICD-10-CM

## 2024-10-05 DIAGNOSIS — R56.9 SEIZURE-LIKE ACTIVITY (HCC): ICD-10-CM

## 2024-10-05 DIAGNOSIS — R00.1 BRADYCARDIA: ICD-10-CM

## 2024-10-05 DIAGNOSIS — T85.528A DISLODGED GASTROSTOMY TUBE: ICD-10-CM

## 2024-10-05 DIAGNOSIS — E87.0 HYPERNATREMIA: ICD-10-CM

## 2024-10-05 DIAGNOSIS — R21 RASH OF BACK: ICD-10-CM

## 2024-10-05 DIAGNOSIS — Z93.1 S/P PERCUTANEOUS ENDOSCOPIC GASTROSTOMY (PEG) TUBE PLACEMENT (HCC): ICD-10-CM

## 2024-10-05 DIAGNOSIS — Z74.09 IMPAIRED MOBILITY: ICD-10-CM

## 2024-10-05 DIAGNOSIS — I46.8 CARDIAC ARREST DUE TO OTHER UNDERLYING CONDITION (HCC): ICD-10-CM

## 2024-10-05 DIAGNOSIS — T17.500A MUCUS PLUGGING OF BRONCHI: ICD-10-CM

## 2024-10-05 DIAGNOSIS — J96.11 CHRONIC RESPIRATORY FAILURE WITH HYPOXIA AND HYPERCAPNIA (HCC): ICD-10-CM

## 2024-10-05 DIAGNOSIS — J95.03 TRACHEOSTOMY MALFUNCTION (HCC): ICD-10-CM

## 2024-10-05 DIAGNOSIS — K42.9 HERNIA, UMBILICAL: ICD-10-CM

## 2024-10-05 DIAGNOSIS — R93.89 ABNORMAL MRI: ICD-10-CM

## 2024-10-05 DIAGNOSIS — G92.8 TOXIC METABOLIC ENCEPHALOPATHY: ICD-10-CM

## 2024-10-05 DIAGNOSIS — G37.3 TRANSVERSE MYELITIS (HCC): ICD-10-CM

## 2024-10-05 DIAGNOSIS — R21 RASH: ICD-10-CM

## 2024-10-05 LAB
2HR DELTA HS TROPONIN: -4 NG/L
ALBUMIN SERPL BCG-MCNC: 4.7 G/DL (ref 3.5–5)
ALP SERPL-CCNC: 116 U/L (ref 34–104)
ALT SERPL W P-5'-P-CCNC: 27 U/L (ref 7–52)
ANION GAP SERPL CALCULATED.3IONS-SCNC: 21 MMOL/L (ref 4–13)
APTT PPP: 32 SECONDS (ref 23–34)
ARTERIAL PATENCY WRIST A: NO
AST SERPL W P-5'-P-CCNC: 20 U/L (ref 13–39)
ATRIAL RATE: 122 BPM
BASE EX.OXY STD BLDV CALC-SCNC: 77.2 % (ref 60–80)
BASE EXCESS BLDV CALC-SCNC: 3 MMOL/L
BASOPHILS # BLD AUTO: 0.05 THOUSANDS/ÂΜL (ref 0–0.1)
BASOPHILS # BLD AUTO: 0.08 THOUSANDS/ΜL (ref 0–0.1)
BASOPHILS NFR BLD AUTO: 0 % (ref 0–1)
BASOPHILS NFR BLD AUTO: 1 % (ref 0–1)
BILIRUB SERPL-MCNC: 1.06 MG/DL (ref 0.2–1)
BUN SERPL-MCNC: 13 MG/DL (ref 5–25)
CA-I BLD-SCNC: 1.09 MMOL/L (ref 1.12–1.32)
CALCIUM SERPL-MCNC: 10.2 MG/DL (ref 8.4–10.2)
CARDIAC TROPONIN I PNL SERPL HS: 12 NG/L
CARDIAC TROPONIN I PNL SERPL HS: 16 NG/L
CHLORIDE SERPL-SCNC: 116 MMOL/L (ref 96–108)
CO2 SERPL-SCNC: 38 MMOL/L (ref 21–32)
CREAT SERPL-MCNC: 1.1 MG/DL (ref 0.6–1.3)
EOSINOPHIL # BLD AUTO: 0.05 THOUSAND/ΜL (ref 0–0.61)
EOSINOPHIL # BLD AUTO: 0.06 THOUSAND/ÂΜL (ref 0–0.61)
EOSINOPHIL NFR BLD AUTO: 0 % (ref 0–6)
EOSINOPHIL NFR BLD AUTO: 0 % (ref 0–6)
ERYTHROCYTE [DISTWIDTH] IN BLOOD BY AUTOMATED COUNT: 18.6 % (ref 11.6–15.1)
ERYTHROCYTE [DISTWIDTH] IN BLOOD BY AUTOMATED COUNT: 19.1 % (ref 11.6–15.1)
FLUAV AG UPPER RESP QL IA.RAPID: NEGATIVE
FLUBV AG UPPER RESP QL IA.RAPID: NEGATIVE
GFR SERPL CREATININE-BSD FRML MDRD: 85 ML/MIN/1.73SQ M
GLUCOSE SERPL-MCNC: 94 MG/DL (ref 65–140)
HCO3 BLDV-SCNC: 30.9 MMOL/L (ref 24–30)
HCT VFR BLD AUTO: 48.5 % (ref 36.5–49.3)
HCT VFR BLD AUTO: 63.2 % (ref 36.5–49.3)
HGB BLD-MCNC: 14.4 G/DL (ref 12–17)
HGB BLD-MCNC: 18.2 G/DL (ref 12–17)
IMM GRANULOCYTES # BLD AUTO: 0.09 THOUSAND/UL (ref 0–0.2)
IMM GRANULOCYTES # BLD AUTO: 0.15 THOUSAND/UL (ref 0–0.2)
IMM GRANULOCYTES NFR BLD AUTO: 1 % (ref 0–2)
IMM GRANULOCYTES NFR BLD AUTO: 1 % (ref 0–2)
INR PPP: 1.63 (ref 0.85–1.19)
LACTATE SERPL-SCNC: 0.8 MMOL/L (ref 0.5–2)
LACTATE SERPL-SCNC: 12.1 MMOL/L (ref 0.5–2)
LYMPHOCYTES # BLD AUTO: 2.01 THOUSANDS/ÂΜL (ref 0.6–4.47)
LYMPHOCYTES # BLD AUTO: 2.46 THOUSANDS/ΜL (ref 0.6–4.47)
LYMPHOCYTES NFR BLD AUTO: 13 % (ref 14–44)
LYMPHOCYTES NFR BLD AUTO: 17 % (ref 14–44)
MAGNESIUM SERPL-MCNC: 2.6 MG/DL (ref 1.9–2.7)
MCH RBC QN AUTO: 30.5 PG (ref 26.8–34.3)
MCH RBC QN AUTO: 30.8 PG (ref 26.8–34.3)
MCHC RBC AUTO-ENTMCNC: 28.8 G/DL (ref 31.4–37.4)
MCHC RBC AUTO-ENTMCNC: 29.7 G/DL (ref 31.4–37.4)
MCV RBC AUTO: 103 FL (ref 82–98)
MCV RBC AUTO: 107 FL (ref 82–98)
MONOCYTES # BLD AUTO: 1.11 THOUSAND/ÂΜL (ref 0.17–1.22)
MONOCYTES # BLD AUTO: 1.29 THOUSAND/ΜL (ref 0.17–1.22)
MONOCYTES NFR BLD AUTO: 7 % (ref 4–12)
MONOCYTES NFR BLD AUTO: 9 % (ref 4–12)
NEUTROPHILS # BLD AUTO: 10.73 THOUSANDS/ΜL (ref 1.85–7.62)
NEUTROPHILS # BLD AUTO: 12.34 THOUSANDS/ÂΜL (ref 1.85–7.62)
NEUTS SEG NFR BLD AUTO: 72 % (ref 43–75)
NEUTS SEG NFR BLD AUTO: 79 % (ref 43–75)
NRBC BLD AUTO-RTO: 0 /100 WBCS
NRBC BLD AUTO-RTO: 0 /100 WBCS
O2 CT BLDV-SCNC: 16.1 ML/DL
P AXIS: 40 DEGREES
PCO2 BLDV: 60.7 MM HG (ref 42–50)
PH BLDV: 7.32 [PH] (ref 7.3–7.4)
PHOSPHATE SERPL-MCNC: 3.2 MG/DL (ref 2.7–4.5)
PLATELET # BLD AUTO: 178 THOUSANDS/UL (ref 149–390)
PLATELET # BLD AUTO: 228 THOUSANDS/UL (ref 149–390)
PMV BLD AUTO: 10.4 FL (ref 8.9–12.7)
PMV BLD AUTO: 10.6 FL (ref 8.9–12.7)
PO2 BLDV: 45.3 MM HG (ref 35–45)
POTASSIUM SERPL-SCNC: 3.3 MMOL/L (ref 3.5–5.3)
PR INTERVAL: 124 MS
PROCALCITONIN SERPL-MCNC: 0.14 NG/ML
PROT SERPL-MCNC: 8.9 G/DL (ref 6.4–8.4)
PROTHROMBIN TIME: 19.6 SECONDS (ref 12.3–15)
QRS AXIS: 81 DEGREES
QRSD INTERVAL: 82 MS
QT INTERVAL: 308 MS
QTC INTERVAL: 438 MS
RBC # BLD AUTO: 4.72 MILLION/UL (ref 3.88–5.62)
RBC # BLD AUTO: 5.91 MILLION/UL (ref 3.88–5.62)
SARS-COV+SARS-COV-2 AG RESP QL IA.RAPID: NEGATIVE
SODIUM SERPL-SCNC: 175 MMOL/L (ref 135–147)
T WAVE AXIS: -7 DEGREES
VENTRICULAR RATE: 122 BPM
WBC # BLD AUTO: 14.76 THOUSAND/UL (ref 4.31–10.16)
WBC # BLD AUTO: 15.66 THOUSAND/UL (ref 4.31–10.16)

## 2024-10-05 PROCEDURE — 5A1955Z RESPIRATORY VENTILATION, GREATER THAN 96 CONSECUTIVE HOURS: ICD-10-PCS

## 2024-10-05 PROCEDURE — 85025 COMPLETE CBC W/AUTO DIFF WBC: CPT

## 2024-10-05 PROCEDURE — 94762 N-INVAS EAR/PLS OXIMTRY CONT: CPT

## 2024-10-05 PROCEDURE — 85730 THROMBOPLASTIN TIME PARTIAL: CPT | Performed by: PHYSICIAN ASSISTANT

## 2024-10-05 PROCEDURE — 94760 N-INVAS EAR/PLS OXIMETRY 1: CPT

## 2024-10-05 PROCEDURE — 93005 ELECTROCARDIOGRAM TRACING: CPT

## 2024-10-05 PROCEDURE — 85610 PROTHROMBIN TIME: CPT | Performed by: PHYSICIAN ASSISTANT

## 2024-10-05 PROCEDURE — 84100 ASSAY OF PHOSPHORUS: CPT

## 2024-10-05 PROCEDURE — 71045 X-RAY EXAM CHEST 1 VIEW: CPT

## 2024-10-05 PROCEDURE — 96375 TX/PRO/DX INJ NEW DRUG ADDON: CPT

## 2024-10-05 PROCEDURE — 94002 VENT MGMT INPAT INIT DAY: CPT

## 2024-10-05 PROCEDURE — 82330 ASSAY OF CALCIUM: CPT

## 2024-10-05 PROCEDURE — 80053 COMPREHEN METABOLIC PANEL: CPT

## 2024-10-05 PROCEDURE — 85025 COMPLETE CBC W/AUTO DIFF WBC: CPT | Performed by: PHYSICIAN ASSISTANT

## 2024-10-05 PROCEDURE — 83735 ASSAY OF MAGNESIUM: CPT

## 2024-10-05 PROCEDURE — 87804 INFLUENZA ASSAY W/OPTIC: CPT | Performed by: PHYSICIAN ASSISTANT

## 2024-10-05 PROCEDURE — 87811 SARS-COV-2 COVID19 W/OPTIC: CPT | Performed by: PHYSICIAN ASSISTANT

## 2024-10-05 PROCEDURE — 87040 BLOOD CULTURE FOR BACTERIA: CPT

## 2024-10-05 PROCEDURE — 82805 BLOOD GASES W/O2 SATURATION: CPT

## 2024-10-05 PROCEDURE — 80053 COMPREHEN METABOLIC PANEL: CPT | Performed by: PHYSICIAN ASSISTANT

## 2024-10-05 PROCEDURE — 36415 COLL VENOUS BLD VENIPUNCTURE: CPT | Performed by: PHYSICIAN ASSISTANT

## 2024-10-05 PROCEDURE — 96365 THER/PROPH/DIAG IV INF INIT: CPT

## 2024-10-05 PROCEDURE — 87040 BLOOD CULTURE FOR BACTERIA: CPT | Performed by: PHYSICIAN ASSISTANT

## 2024-10-05 PROCEDURE — 83605 ASSAY OF LACTIC ACID: CPT | Performed by: PHYSICIAN ASSISTANT

## 2024-10-05 PROCEDURE — 84484 ASSAY OF TROPONIN QUANT: CPT | Performed by: EMERGENCY MEDICINE

## 2024-10-05 PROCEDURE — 84145 PROCALCITONIN (PCT): CPT | Performed by: PHYSICIAN ASSISTANT

## 2024-10-05 PROCEDURE — 96367 TX/PROPH/DG ADDL SEQ IV INF: CPT

## 2024-10-05 PROCEDURE — 99285 EMERGENCY DEPT VISIT HI MDM: CPT

## 2024-10-05 RX ORDER — LEVALBUTEROL INHALATION SOLUTION 1.25 MG/3ML
1.25 SOLUTION RESPIRATORY (INHALATION)
Status: DISCONTINUED | OUTPATIENT
Start: 2024-10-06 | End: 2024-10-06

## 2024-10-05 RX ORDER — VANCOMYCIN HYDROCHLORIDE 1 G/20ML
INJECTION, POWDER, LYOPHILIZED, FOR SOLUTION INTRAVENOUS
Status: COMPLETED
Start: 2024-10-05 | End: 2024-10-05

## 2024-10-05 RX ORDER — ACETAMINOPHEN 10 MG/ML
1000 INJECTION, SOLUTION INTRAVENOUS ONCE
Status: COMPLETED | OUTPATIENT
Start: 2024-10-05 | End: 2024-10-05

## 2024-10-05 RX ORDER — CHLORHEXIDINE GLUCONATE ORAL RINSE 1.2 MG/ML
15 SOLUTION DENTAL EVERY 12 HOURS SCHEDULED
Status: DISCONTINUED | OUTPATIENT
Start: 2024-10-05 | End: 2024-10-31 | Stop reason: HOSPADM

## 2024-10-05 RX ORDER — SODIUM CHLORIDE, SODIUM GLUCONATE, SODIUM ACETATE, POTASSIUM CHLORIDE, MAGNESIUM CHLORIDE, SODIUM PHOSPHATE, DIBASIC, AND POTASSIUM PHOSPHATE .53; .5; .37; .037; .03; .012; .00082 G/100ML; G/100ML; G/100ML; G/100ML; G/100ML; G/100ML; G/100ML
1000 INJECTION, SOLUTION INTRAVENOUS ONCE
Status: COMPLETED | OUTPATIENT
Start: 2024-10-05 | End: 2024-10-05

## 2024-10-05 RX ORDER — SODIUM CHLORIDE FOR INHALATION 3 %
4 VIAL, NEBULIZER (ML) INHALATION
Status: DISCONTINUED | OUTPATIENT
Start: 2024-10-06 | End: 2024-10-06

## 2024-10-05 RX ORDER — ACETAMINOPHEN 160 MG/5ML
650 SUSPENSION ORAL EVERY 6 HOURS PRN
Status: DISCONTINUED | OUTPATIENT
Start: 2024-10-05 | End: 2024-10-11

## 2024-10-05 RX ORDER — CEFEPIME HYDROCHLORIDE 2 G/50ML
2000 INJECTION, SOLUTION INTRAVENOUS ONCE
Status: COMPLETED | OUTPATIENT
Start: 2024-10-05 | End: 2024-10-05

## 2024-10-05 RX ORDER — SODIUM CHLORIDE FOR INHALATION 3 %
4 VIAL, NEBULIZER (ML) INHALATION 3 TIMES DAILY
Status: DISCONTINUED | OUTPATIENT
Start: 2024-10-05 | End: 2024-10-05

## 2024-10-05 RX ADMIN — SODIUM CHLORIDE 1000 ML: 0.9 INJECTION, SOLUTION INTRAVENOUS at 20:02

## 2024-10-05 RX ADMIN — IOHEXOL 40 ML: 240 INJECTION, SOLUTION INTRATHECAL; INTRAVASCULAR; INTRAVENOUS; ORAL at 19:18

## 2024-10-05 RX ADMIN — CEFEPIME HYDROCHLORIDE 2000 MG: 2 INJECTION, SOLUTION INTRAVENOUS at 20:02

## 2024-10-05 RX ADMIN — SODIUM CHLORIDE, SODIUM GLUCONATE, SODIUM ACETATE, POTASSIUM CHLORIDE, MAGNESIUM CHLORIDE, SODIUM PHOSPHATE, DIBASIC, AND POTASSIUM PHOSPHATE 1000 ML: .53; .5; .37; .037; .03; .012; .00082 INJECTION, SOLUTION INTRAVENOUS at 20:45

## 2024-10-05 RX ADMIN — ACETAMINOPHEN 1000 MG: 10 INJECTION INTRAVENOUS at 19:43

## 2024-10-05 RX ADMIN — CHLORHEXIDINE GLUCONATE 15 ML: 1.2 RINSE ORAL at 23:17

## 2024-10-05 RX ADMIN — VANCOMYCIN HYDROCHLORIDE 2000 MG: 1 INJECTION, POWDER, LYOPHILIZED, FOR SOLUTION INTRAVENOUS at 21:00

## 2024-10-05 RX ADMIN — SODIUM CHLORIDE 1000 ML: 0.9 INJECTION, SOLUTION INTRAVENOUS at 19:36

## 2024-10-05 NOTE — ED ATTENDING ATTESTATION
10/5/2024  I, Starr Garcia MD, saw and evaluated the patient. I have discussed the patient with the resident/non-physician practitioner and agree with the resident's/non-physician practitioner's findings, Plan of Care, and MDM as documented in the resident's/non-physician practitioner's note, except where noted. All available labs and Radiology studies were reviewed.  I was present for key portions of any procedure(s) performed by the resident/non-physician practitioner and I was immediately available to provide assistance.       At this point I agree with the current assessment done in the Emergency Department.  I have conducted an independent evaluation of this patient a history and physical is as follows:    ED Course   39-year-old male history of testicular malignancy and chronically ventilator dependent with a tracheostomy for neuromuscular problems as well as prior cardiac arrest initially presenting due to pulling out his G-tube.  G-tube was replaced successfully however patient was noted to be hypoxic to the 80s on his usual ventilator settings and subsequently developed a fever in the ED.  His trach was suction producing copious secretions.  Chest x-ray was obtained showing complete whiteout of the left lung despite appropriate positioning of the tracheostomy.  Patient was placed on our hospital ventilator with improvement of his oxygenation after FiO2 was increased to 100% however patient still intermittently desatting and with highest sats achieved 92%.  Bedside lung ultrasound was obtained which did not show a pleural effusion therefore suspect lung consolidation possibly secondary to mucous plugging which patient has had problems within the past.  Patient treated for sepsis with IV fluids based on ideal body weight as well as broad-spectrum antibiotics initiated after blood cultures were drawn.  Patient's port was accessed due to no longer being used for chemotherapy at this time to facilitate  emergent medical care.    Screening labs notable for hemoconcentration with white blood cell count of 14 and hemoglobin of 18 from baseline of 12.  Initial lactate of 12.1.  Probably from combination of hypoperfusion from dehydration as well as hypoxia.  Sodium of 175, creatinine has doubled from baseline but GFR is still 85.    Patient accepted at [facility] by Dr. Sams.  Transferred without further incident.  Critical Care Time  CriticalCare Time    Date/Time: 10/5/2024 11:11 PM    Performed by: Starr Garcia MD  Authorized by: Starr Garcia MD    Critical care provider statement:     Critical care time (minutes):  60    Critical care was necessary to treat or prevent imminent or life-threatening deterioration of the following conditions:  Respiratory failure, sepsis and dehydration    Critical care was time spent personally by me on the following activities:  Evaluation of patient's response to treatment, examination of patient, discussions with consultants, interpretation of cardiac output measurements, ordering and performing treatments and interventions, ordering and review of laboratory studies, ordering and review of radiographic studies, re-evaluation of patient's condition, ventilator management and review of old charts

## 2024-10-05 NOTE — ED PROVIDER NOTES
Final diagnoses:   Acute on chronic hypoxic respiratory failure (HCC) - white out of left lung; chronic trach on ventilator   Sepsis (HCC)   Dislodged gastrostomy tube - replaced     ED Disposition       ED Disposition   Transfer to Another Facility-In Network    Condition   --    Date/Time   Sat Oct 5, 2024  8:12 PM    Comment   Hemanth Swanson should be transferred out to [facility]  .               Assessment & Plan       Medical Decision Making  Patient brought in by EMS from home, chronic trach patient, on home vent, h/o mucous plugging, presented after pulled out G tube.  Per EMS/family states pt getting Testosterone for side effects after orchiectomy; but now noted to be getting increasingly combative.  Pt noted to have fever/sweating here, hypoxia in high 80's, increased sputum production, respiratory called to help suction.  G tube replaced by me (16 Fr, 6mL balloon), confirmatory films done with cxr also showing new complete white out of left lung, pt septic.  Pt changed over to our vent for better management, labs ordered, antibiotics ordered, transfer started to ICU.  Attending involved in care, case discussed with ICU at [facility], who accepts pt in transfer; care turned over to attending pending transfer to [facility] ICU          Amount and/or Complexity of Data Reviewed  Labs: ordered. Decision-making details documented in ED Course.  Radiology: ordered and independent interpretation performed.  ECG/medicine tests: ordered and independent interpretation performed. Decision-making details documented in ED Course.     Details: Sinus tach @ 122, non specific stt wave abn, no acute stemi  Discussion of management or test interpretation with external provider(s): Case discussed with attending, Radha, who discussed with [facility] ICU, accepted pt in transfer    Risk  Prescription drug management.  Decision regarding hospitalization.        ED Course as of 10/05/24 2020   Sat Oct 05, 2024   2004 Viral panel neg    2004 WBC(!): 14.76  abn   2004 Attending spoke to ICU, zia,   2014 Procalcitonin: 0.14  normal   2014 hs TnI 0hr: 16  Will get delta       Medications   cefepime (MAXIPIME) IVPB (premix in dextrose) 2,000 mg 50 mL (2,000 mg Intravenous New Bag 10/5/24 2002)   vancomycin (VANCOCIN) 2,000 mg in sodium chloride 0.9 % 500 mL IVPB (has no administration in time range)   sodium chloride 0.9 % bolus 1,000 mL (1,000 mL Intravenous New Bag 10/5/24 1936)     Followed by   sodium chloride 0.9 % bolus 1,000 mL (1,000 mL Intravenous New Bag 10/5/24 2002)     Followed by   sodium chloride 0.9 % bolus 1,000 mL (has no administration in time range)   iohexol (OMNIPAQUE) 240 MG/ML solution 40 mL (40 mL Oral Given 10/5/24 1918)   acetaminophen (Ofirmev) injection 1,000 mg (0 mg Intravenous Stopped 10/5/24 1958)       ED Risk Strat Scores                           SBIRT 22yo+      Flowsheet Row Most Recent Value   Initial Alcohol Screen: US AUDIT-C     1. How often do you have a drink containing alcohol? 0 Filed at: 10/05/2024 1831   2. How many drinks containing alcohol do you have on a typical day you are drinking?  0 Filed at: 10/05/2024 1831   3a. Male UNDER 65: How often do you have five or more drinks on one occasion? 0 Filed at: 10/05/2024 1831   Audit-C Score 0 Filed at: 10/05/2024 1831   JEAN MARIE: How many times in the past year have you...    Used an illegal drug or used a prescription medication for non-medical reasons? Never Filed at: 10/05/2024 1831                            History of Present Illness       Chief Complaint   Patient presents with    Feeding Tube Problem     Patient brought in by EMS from home due to pulling out G-Tube. Pt recently started receiving Testosterone Injections. Per family, pt has become increasingly combative.        Past Medical History:   Diagnosis Date    Anxiety     Cancer (HCC)     Depression     Migration of percutaneous endoscopic gastrostomy (PEG) tube (HCC) 09/24/2023     "Psychiatric disorder     bipolar      Past Surgical History:   Procedure Laterality Date    IR BIOPSY LYMPH NODE  2023    IR GASTROSTOMY (G) TUBE CHECK/CHANGE/REINSERTION/UPSIZE  2024    IR GASTROSTOMY TUBE PLACEMENT  2023    IR LUMBAR PUNCTURE  2023    IR PORT PLACEMENT  2023    ORCHIECTOMY Left 2022    Procedure: ORCHIECTOMY INGUINAL;  Surgeon: Flip Michael MD;  Location:  MAIN OR;  Service: Urology    PEG W/TRACHEOSTOMY PLACEMENT N/A 2023    Procedure: TRACHEOSTOMY WITH INSERTION PEG TUBE;  Surgeon: Rudy Mcmanus MD;  Location: [facility];  Service: General    TESTICLE SURGERY        Family History   Problem Relation Age of Onset    Coronary artery disease Maternal Aunt     Cancer Father     Diabetes Father     Hypertension Father       Social History     Tobacco Use    Smoking status: Former     Current packs/day: 0.00     Average packs/day: 0.7 packs/day for 22.7 years (15.0 ttl pk-yrs)     Types: Cigarettes     Start date: 2008     Quit date: 2023     Years since quittin.3    Smokeless tobacco: Never   Vaping Use    Vaping status: Former    Start date: 2018    Quit date: 2023    Substances: Nicotine, THC   Substance Use Topics    Alcohol use: Not Currently     Comment: Former alcoholic. Last use in     Drug use: Not Currently     Types: \"Crack\" cocaine, Marijuana     Comment: Current use medical marijuana. Not currently using crack cocaine.      E-Cigarette/Vaping    E-Cigarette Use Former User     Start Date 20     Quit Date 23       E-Cigarette/Vaping Substances    Nicotine Yes     THC Yes     CBD No     Flavoring No     Other No     Unknown No       I have reviewed and agree with the history as documented.     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  PSH: gastrostomy (PEG) tube, Left Orchiectomy, biopsy lymph node, IR port placement, lumbar puncture, PEG w/tracheostomy placement, Testicle surgery, IR gastrostomy (G) tube check/change/reinsertion/upsize    Patient brought in by EMS from home, chronic trach patient, on home vent, h/o mucous plugging, presented after pulled out G tube.  Per EMS/family states pt getting Testosterone for side effects after orchiectomy; but now noted to be getting increasingly combative.  Pt noted to have fever/sweating here, hypoxia in high 80's, increased sputum production, respiratory called to help suction.  G tube replaced by me (16 Fr, 6mL balloon), confirmatory films done with cxr also showing new complete white out of left lung, pt septic.  Pt changed over to our vent for better management, labs ordered, antibiotics ordered, transfer started to ICU.  Attending involved in care.            Review of Systems   Unable to perform ROS: Patient nonverbal (pt with fever, worsening condition)   Constitutional:  Positive for diaphoresis, fatigue and fever.   HENT:  Positive for congestion.    Respiratory:  Positive for shortness of breath.    Gastrointestinal:  Negative for vomiting.   Neurological:  Positive for weakness.   All other systems reviewed and are negative.          Objective       ED Triage Vitals   Temperature Pulse Blood Pressure Respirations SpO2 Patient Position - Orthostatic VS   10/05/24 1822 10/05/24 1822 10/05/24 1822 10/05/24 1822 10/05/24 1822 10/05/24 1822   99.3 °F (37.4 °C) 90 106/80 18 (!) 85 % Lying      Temp Source Heart Rate Source BP Location FiO2 (%) Pain Score    10/05/24 1822 10/05/24 1822 10/05/24 1822 10/05/24 2000 --    Tympanic Monitor Right arm 100       Vitals      Date and Time Temp Pulse SpO2 Resp BP Pain Score FACES Pain Rating User   10/05/24 2000 -- 97 92 % 14 113/76 -- -- CW   10/05/24 1945 -- 100 91 % 15 137/83 -- -- CW   10/05/24 1930 -- -- -- -- 121/79 -- -- CW   10/05/24 1915  -- 116 80 % -- 110/71 -- -- CW   10/05/24 1900 101.9 °F (38.8 °C) 87 86 % 26 110/71 -- -- CW   10/05/24 1822 99.3 °F (37.4 °C) 90 85 % 18 106/80 -- -- DG            Physical Exam  Vitals and nursing note reviewed.   Constitutional:       General: He is in acute distress.      Appearance: He is well-developed. He is ill-appearing and diaphoretic.   HENT:      Head: Normocephalic and atraumatic.      Nose: Congestion present.      Mouth/Throat:      Mouth: Mucous membranes are moist.   Eyes:      Conjunctiva/sclera: Conjunctivae normal.   Cardiovascular:      Rate and Rhythm: Regular rhythm. Tachycardia present.   Pulmonary:      Effort: Respiratory distress present.      Breath sounds: Rhonchi present.      Comments: Intermittent Sputum production  Abdominal:      General: Bowel sounds are normal.      Palpations: Abdomen is soft.      Tenderness: There is no abdominal tenderness.      Hernia: A hernia (soft umbilical) is present.      Comments: G tube stoma LUQ, no bleeding, no dc   Musculoskeletal:         General: Normal range of motion.      Cervical back: Normal range of motion.      Right lower leg: No edema.      Left lower leg: No edema.   Skin:     General: Skin is warm.   Neurological:      Mental Status: He is alert.      Motor: Weakness present.         Results Reviewed       Procedure Component Value Units Date/Time    Procalcitonin [[id number]]  (Normal) Collected: 10/05/24 1934    Lab Status: Final result Specimen: Blood from Arm, Right Updated: 10/05/24 2011     Procalcitonin 0.14 ng/ml     HS Troponin I 4hr [[id number]]     Lab Status: No result Specimen: Blood     HS Troponin I 2hr [[id number]]     Lab Status: No result Specimen: Blood     HS Troponin 0hr (reflex protocol) [[id number]]  (Normal) Collected: 10/05/24 1940    Lab Status: Final result Specimen: Blood from Arm, Right Updated: 10/05/24 2010     hs TnI 0hr 16 ng/L     FLU/COVID Rapid Antigen (30 min. TAT) - Preferred screening test in ED  [[id number]]  (Normal) Collected: 10/05/24 1934    Lab Status: Final result Specimen: Nares from Nose Updated: 10/05/24 2003     SARS COV Rapid Antigen Negative     Influenza A Rapid Antigen Negative     Influenza B Rapid Antigen Negative    Narrative:      This test has been performed using the Quidel Viviana 2 FLU+SARS Antigen test under the Emergency Use Authorization (EUA). This test has been validated by the  and verified by the performing laboratory. The Viviana uses lateral flow immunofluorescent sandwich assay to detect SARS-COV, Influenza A and Influenza B Antigen.     The Quidel Viviana 2 SARS Antigen test does not differentiate between SARS-CoV and SARS-CoV-2.     Negative results are presumptive and may be confirmed with a molecular assay, if necessary, for patient management. Negative results do not rule out SARS-CoV-2 or influenza infection and should not be used as the sole basis for treatment or patient management decisions. A negative test result may occur if the level of antigen in a sample is below the limit of detection of this test.     Positive results are indicative of the presence of viral antigens, but do not rule out bacterial infection or co-infection with other viruses.     All test results should be used as an adjunct to clinical observations and other information available to the provider.    FOR PEDIATRIC PATIENTS - copy/paste COVID Guidelines URL to browser: [email or web address]    Blood culture #1 [[id number]] Collected: 10/05/24 1951    Lab Status: In process Specimen: Blood from Arm, Left Updated: 10/05/24 1955    CBC and differential [[id number]]  (Abnormal) Collected: 10/05/24 1934    Lab Status: Final result Specimen: Blood from Arm, Right Updated: 10/05/24 1953     WBC 14.76 Thousand/uL      RBC 5.91 Million/uL      Hemoglobin 18.2 g/dL      Hematocrit 63.2 %       fL      MCH 30.8 pg      MCHC 28.8 g/dL      RDW  19.1 %      MPV 10.4 fL      Platelets 228 Thousands/uL      nRBC 0 /100 WBCs      Segmented % 72 %      Immature Grans % 1 %      Lymphocytes % 17 %      Monocytes % 9 %      Eosinophils Relative 0 %      Basophils Relative 1 %      Absolute Neutrophils 10.73 Thousands/µL      Absolute Immature Grans 0.15 Thousand/uL      Absolute Lymphocytes 2.46 Thousands/µL      Absolute Monocytes 1.29 Thousand/µL      Eosinophils Absolute 0.05 Thousand/µL      Basophils Absolute 0.08 Thousands/µL     Comprehensive metabolic panel [[id number]] Collected: 10/05/24 1934    Lab Status: In process Specimen: Blood from Arm, Right Updated: 10/05/24 1944    Lactic acid [[id number]] Collected: 10/05/24 1934    Lab Status: In process Specimen: Blood from Arm, Right Updated: 10/05/24 1944    Blood culture #2 [[id number]] Collected: 10/05/24 1934    Lab Status: In process Specimen: Blood from Arm, Right Updated: 10/05/24 1944    Protime-INR [[id number]] Collected: 10/05/24 1934    Lab Status: No result Specimen: Blood from Arm, Right     APTT [[id number]] Collected: 10/05/24 1934    Lab Status: No result Specimen: Blood from Arm, Right     UA w Reflex to Microscopic w Reflex to Culture [[id number]]     Lab Status: No result Specimen: Urine, Clean Catch             XR chest portable   ED Interpretation by Shirley Blake PA-C (10/05 2004)   White out L left          Gastrostomy tube removal w/o sedation    Date/Time: 10/5/2024 7:41 PM    Performed by: Shirley Blake PA-C  Authorized by: Shirley Blake PA-C    Patient location:  ED  Other Assisting Provider: No    Consent:     Consent obtained:  Emergent situation    Consent given by: assumed consent, pt lives at home with family and was sent in by family for replacement.    Risks discussed:  Bleeding and pain  Universal protocol:     Patient identity confirmed:  Arm band  Pre-procedure details:     Old tube type: according to old records IR placed 16 Fr G tube on 6/2024.    Old tube size:   "16 Fr  Indications:     Indications: tube removed by patient    Procedure details:     Patient position:  Supine    Procedure type:  Replacement    Tube type:  Gastrostomy    Tube :  Enfit    Tube size:  16 Fr    Bulb inflation volume:  6ml    Bulb inflation fluid:  Sterile water  Post-procedure details:     Placement/position confirmation:  Auscultation, contrast and x-ray    Placement difficulty:  None    Bleeding:  None    Patient tolerance of procedure:  Tolerated well, no immediate complications  CriticalCare Time    Date/Time: 10/5/2024 8:12 PM    Performed by: Shirley Blake PA-C  Authorized by: Shirley Blake PA-C    Critical care provider statement:     Critical care time (minutes):  30    Critical care time was exclusive of:  Separately billable procedures and treating other patients    Critical care was necessary to treat or prevent imminent or life-threatening deterioration of the following conditions:  Respiratory failure and sepsis    Critical care was time spent personally by me on the following activities:  Obtaining history from patient or surrogate, development of treatment plan with patient or surrogate, evaluation of patient's response to treatment, examination of patient, interpretation of cardiac output measurements, ordering and performing treatments and interventions, ordering and review of laboratory studies, ordering and review of radiographic studies, re-evaluation of patient's condition, review of old charts and ventilator management      ED Medication and Procedure Management   Prior to Admission Medications   Prescriptions Last Dose Informant Patient Reported? Taking?   Incontinence Supply Disposable (Comfort Shield Adult Diapers) MISC  Family Member No No   Sig: Use 1 each 4 (four) times a day   NEEDLE, DISP, 18 G 18G X 1\" MISC   No No   Sig: Use 2 (two) times a week   Oral Hygiene Products (Toothette Swabs/Dentifrice) SWAB  Family Member No No   Sig: Apply to the mouth " "or throat 3 (three) times a day   Syringe/Needle, Disp, (Syringe Luer Slip) 25G X 5/8\" 1 ML MISC   No No   Sig: Use 2 (two) times a week   apixaban (ELIQUIS) 5 mg  Family Member No No   Si tablet (5 mg total) by Per PEG Tube route 2 (two) times a day   oxygen gas  Family Member Yes No   Sig: Inhale 6 L/min as needed   sodium chloride (BRONCHO SALINE) inhaler solution  Family Member Yes No   Sig: Take 1 spray by nebulization every 8 (eight) hours   testosterone cypionate (DEPO-TESTOSTERONE) 200 mg/mL SOLN   No No   Sig: Inject 0.5 mL (100 mg total) into a muscle 2 (two) times a week      Facility-Administered Medications: None     Patient's Medications   Discharge Prescriptions    No medications on file     No discharge procedures on file.  ED SEPSIS DOCUMENTATION   Time reflects when diagnosis was documented in both MDM as applicable and the Disposition within this note       Time User Action Codes Description Comment    10/5/2024  7:38 PM Starr Garcia [J96.21] Acute on chronic hypoxic respiratory failure (HCC)     10/5/2024  7:44 PM Shirley Blake [A41.9] Sepsis (HCC)     10/5/2024  7:44 PM Shirley Blake Add [R09.02] Hypoxia     10/5/2024  7:44 PM Shirley Blake Remove [R09.02] Hypoxia     10/5/2024  7:44 PM Shirley Blake Modify [J96.21] Acute on chronic hypoxic respiratory failure (HCC) white out of left lung    10/5/2024  7:44 PM Shirley Blake Add [T85.528A] Dislodged gastrostomy tube     10/5/2024  7:44 PM Shirley Blake Modify [T85.528A] Dislodged gastrostomy tube replaced    10/5/2024  7:45 PM Shirley Blake Modify [J96.21] Acute on chronic hypoxic respiratory failure (HCC) white out of left lung; chronic trach on ventilator                 Shirley Blake PA-C  10/05/24 2020    "

## 2024-10-05 NOTE — ED NOTES
Respiratory Therapist at bedside to suction pt's tracheostomy.     Jarvis Guzman RN  10/05/24 [id number]

## 2024-10-06 ENCOUNTER — APPOINTMENT (INPATIENT)
Dept: GASTROENTEROLOGY | Facility: HOSPITAL | Age: 39
DRG: 720 | End: 2024-10-06
Payer: COMMERCIAL

## 2024-10-06 VITALS
HEART RATE: 81 BPM | TEMPERATURE: 101.9 F | RESPIRATION RATE: 14 BRPM | OXYGEN SATURATION: 95 % | SYSTOLIC BLOOD PRESSURE: 97 MMHG | DIASTOLIC BLOOD PRESSURE: 65 MMHG

## 2024-10-06 PROBLEM — E87.0 HYPERNATREMIA: Status: ACTIVE | Noted: 2024-10-06

## 2024-10-06 LAB
ALBUMIN SERPL BCG-MCNC: 3.3 G/DL (ref 3.5–5)
ALP SERPL-CCNC: 78 U/L (ref 34–104)
ALT SERPL W P-5'-P-CCNC: 19 U/L (ref 7–52)
ANION GAP SERPL CALCULATED.3IONS-SCNC: 3 MMOL/L (ref 4–13)
ANION GAP SERPL CALCULATED.3IONS-SCNC: 5 MMOL/L (ref 4–13)
ANION GAP SERPL CALCULATED.3IONS-SCNC: 5 MMOL/L (ref 4–13)
ANION GAP SERPL CALCULATED.3IONS-SCNC: 6 MMOL/L (ref 4–13)
ANION GAP SERPL CALCULATED.3IONS-SCNC: 6 MMOL/L (ref 4–13)
ANION GAP SERPL CALCULATED.3IONS-SCNC: 7 MMOL/L (ref 4–13)
AST SERPL W P-5'-P-CCNC: 14 U/L (ref 13–39)
BACTERIA UR QL AUTO: ABNORMAL /HPF
BASOPHILS # BLD AUTO: 0.05 THOUSANDS/ΜL (ref 0–0.1)
BASOPHILS NFR BLD AUTO: 0 % (ref 0–1)
BILIRUB SERPL-MCNC: 0.83 MG/DL (ref 0.2–1)
BILIRUB UR QL STRIP: NEGATIVE
BUN SERPL-MCNC: 10 MG/DL (ref 5–25)
BUN SERPL-MCNC: 11 MG/DL (ref 5–25)
BUN SERPL-MCNC: 6 MG/DL (ref 5–25)
BUN SERPL-MCNC: 7 MG/DL (ref 5–25)
BUN SERPL-MCNC: 8 MG/DL (ref 5–25)
BUN SERPL-MCNC: 9 MG/DL (ref 5–25)
CA-I BLD-SCNC: 1.09 MMOL/L (ref 1.12–1.32)
CA-I BLD-SCNC: 1.12 MMOL/L (ref 1.12–1.32)
CALCIUM ALBUM COR SERPL-MCNC: 8.6 MG/DL (ref 8.3–10.1)
CALCIUM SERPL-MCNC: 7.8 MG/DL (ref 8.4–10.2)
CALCIUM SERPL-MCNC: 7.8 MG/DL (ref 8.4–10.2)
CALCIUM SERPL-MCNC: 8 MG/DL (ref 8.4–10.2)
CALCIUM SERPL-MCNC: 8.2 MG/DL (ref 8.4–10.2)
CALCIUM SERPL-MCNC: 8.2 MG/DL (ref 8.4–10.2)
CALCIUM SERPL-MCNC: 8.3 MG/DL (ref 8.4–10.2)
CALCIUM SERPL-MCNC: 8.7 MG/DL (ref 8.4–10.2)
CALCIUM SERPL-MCNC: 8.8 MG/DL (ref 8.4–10.2)
CHLORIDE SERPL-SCNC: 121 MMOL/L (ref 96–108)
CHLORIDE SERPL-SCNC: 121 MMOL/L (ref 96–108)
CHLORIDE SERPL-SCNC: 122 MMOL/L (ref 96–108)
CHLORIDE SERPL-SCNC: 122 MMOL/L (ref 96–108)
CHLORIDE SERPL-SCNC: 123 MMOL/L (ref 96–108)
CHLORIDE SERPL-SCNC: 125 MMOL/L (ref 96–108)
CHLORIDE SERPL-SCNC: 126 MMOL/L (ref 96–108)
CHLORIDE SERPL-SCNC: 126 MMOL/L (ref 96–108)
CLARITY UR: ABNORMAL
CO2 SERPL-SCNC: 30 MMOL/L (ref 21–32)
CO2 SERPL-SCNC: 32 MMOL/L (ref 21–32)
CO2 SERPL-SCNC: 32 MMOL/L (ref 21–32)
CO2 SERPL-SCNC: 33 MMOL/L (ref 21–32)
CO2 SERPL-SCNC: 34 MMOL/L (ref 21–32)
CO2 SERPL-SCNC: 35 MMOL/L (ref 21–32)
COLOR UR: YELLOW
CREAT SERPL-MCNC: 0.67 MG/DL (ref 0.6–1.3)
CREAT SERPL-MCNC: 0.68 MG/DL (ref 0.6–1.3)
CREAT SERPL-MCNC: 0.68 MG/DL (ref 0.6–1.3)
CREAT SERPL-MCNC: 0.7 MG/DL (ref 0.6–1.3)
CREAT SERPL-MCNC: 0.73 MG/DL (ref 0.6–1.3)
CREAT SERPL-MCNC: 0.82 MG/DL (ref 0.6–1.3)
CREAT SERPL-MCNC: 0.82 MG/DL (ref 0.6–1.3)
CREAT SERPL-MCNC: 0.84 MG/DL (ref 0.6–1.3)
EOSINOPHIL # BLD AUTO: 0.04 THOUSAND/ΜL (ref 0–0.61)
EOSINOPHIL NFR BLD AUTO: 0 % (ref 0–6)
EOSINOPHIL NFR FLD MANUAL: 1 %
ERYTHROCYTE [DISTWIDTH] IN BLOOD BY AUTOMATED COUNT: 18.8 % (ref 11.6–15.1)
GFR SERPL CREATININE-BSD FRML MDRD: 111 ML/MIN/1.73SQ M
GFR SERPL CREATININE-BSD FRML MDRD: 112 ML/MIN/1.73SQ M
GFR SERPL CREATININE-BSD FRML MDRD: 112 ML/MIN/1.73SQ M
GFR SERPL CREATININE-BSD FRML MDRD: 118 ML/MIN/1.73SQ M
GFR SERPL CREATININE-BSD FRML MDRD: 120 ML/MIN/1.73SQ M
GFR SERPL CREATININE-BSD FRML MDRD: 122 ML/MIN/1.73SQ M
GLUCOSE SERPL-MCNC: 100 MG/DL (ref 65–140)
GLUCOSE SERPL-MCNC: 100 MG/DL (ref 65–140)
GLUCOSE SERPL-MCNC: 102 MG/DL (ref 65–140)
GLUCOSE SERPL-MCNC: 103 MG/DL (ref 65–140)
GLUCOSE SERPL-MCNC: 105 MG/DL (ref 65–140)
GLUCOSE SERPL-MCNC: 116 MG/DL (ref 65–140)
GLUCOSE SERPL-MCNC: 123 MG/DL (ref 65–140)
GLUCOSE SERPL-MCNC: 152 MG/DL (ref 65–140)
GLUCOSE UR STRIP-MCNC: NEGATIVE MG/DL
HCT VFR BLD AUTO: 48.2 % (ref 36.5–49.3)
HGB BLD-MCNC: 14.3 G/DL (ref 12–17)
HGB UR QL STRIP.AUTO: NEGATIVE
HYALINE CASTS #/AREA URNS LPF: ABNORMAL /LPF
IMM GRANULOCYTES # BLD AUTO: 0.1 THOUSAND/UL (ref 0–0.2)
IMM GRANULOCYTES NFR BLD AUTO: 1 % (ref 0–2)
KETONES UR STRIP-MCNC: ABNORMAL MG/DL
L PNEUMO1 AG UR QL IA.RAPID: NEGATIVE
LEUKOCYTE ESTERASE UR QL STRIP: ABNORMAL
LYMPHOCYTES # BLD AUTO: 2.06 THOUSANDS/ΜL (ref 0.6–4.47)
LYMPHOCYTES NFR BLD AUTO: 13 % (ref 14–44)
LYMPHOCYTES NFR BLD AUTO: 7 %
MACROPHAGES NFR FLD: 4 %
MAGNESIUM SERPL-MCNC: 2.3 MG/DL (ref 1.9–2.7)
MAGNESIUM SERPL-MCNC: 2.5 MG/DL (ref 1.9–2.7)
MCH RBC QN AUTO: 30.7 PG (ref 26.8–34.3)
MCHC RBC AUTO-ENTMCNC: 29.7 G/DL (ref 31.4–37.4)
MCV RBC AUTO: 103 FL (ref 82–98)
MONOCYTES # BLD AUTO: 1.05 THOUSAND/ΜL (ref 0.17–1.22)
MONOCYTES NFR BLD AUTO: 7 % (ref 4–12)
MUCOUS THREADS UR QL AUTO: ABNORMAL
NEUTROPHILS # BLD AUTO: 12.26 THOUSANDS/ΜL (ref 1.85–7.62)
NEUTS SEG NFR BLD AUTO: 79 % (ref 43–75)
NEUTS SEG NFR BLD AUTO: 88 %
NITRITE UR QL STRIP: NEGATIVE
NON-SQ EPI CELLS URNS QL MICRO: ABNORMAL /HPF
NRBC BLD AUTO-RTO: 0 /100 WBCS
OSMOLALITY UR/SERPL-RTO: 345 MMOL/KG (ref 282–298)
OSMOLALITY UR: 910 MMOL/KG
PH UR STRIP.AUTO: 5.5 [PH]
PHOSPHATE SERPL-MCNC: 2.3 MG/DL (ref 2.7–4.5)
PHOSPHATE SERPL-MCNC: 2.9 MG/DL (ref 2.7–4.5)
PLATELET # BLD AUTO: 177 THOUSANDS/UL (ref 149–390)
PMV BLD AUTO: 10.2 FL (ref 8.9–12.7)
POTASSIUM SERPL-SCNC: 3.2 MMOL/L (ref 3.5–5.3)
POTASSIUM SERPL-SCNC: 3.4 MMOL/L (ref 3.5–5.3)
POTASSIUM SERPL-SCNC: 3.5 MMOL/L (ref 3.5–5.3)
POTASSIUM SERPL-SCNC: 3.6 MMOL/L (ref 3.5–5.3)
POTASSIUM SERPL-SCNC: 3.7 MMOL/L (ref 3.5–5.3)
POTASSIUM SERPL-SCNC: 3.7 MMOL/L (ref 3.5–5.3)
POTASSIUM SERPL-SCNC: 3.9 MMOL/L (ref 3.5–5.3)
POTASSIUM SERPL-SCNC: 4.1 MMOL/L (ref 3.5–5.3)
PROCALCITONIN SERPL-MCNC: 0.08 NG/ML
PROT SERPL-MCNC: 6.3 G/DL (ref 6.4–8.4)
PROT UR STRIP-MCNC: ABNORMAL MG/DL
RBC # BLD AUTO: 4.66 MILLION/UL (ref 3.88–5.62)
RBC #/AREA URNS AUTO: ABNORMAL /HPF
S PNEUM AG UR QL: NEGATIVE
SODIUM 24H UR-SCNC: 93 MOL/L
SODIUM SERPL-SCNC: 159 MMOL/L (ref 135–147)
SODIUM SERPL-SCNC: 160 MMOL/L (ref 135–147)
SODIUM SERPL-SCNC: 165 MMOL/L (ref 135–147)
SODIUM SERPL-SCNC: 166 MMOL/L (ref 135–147)
SODIUM SERPL-SCNC: 166 MMOL/L (ref 135–147)
SP GR UR STRIP.AUTO: 1.04 (ref 1–1.03)
TOTAL CELLS COUNTED SPEC: 100
UROBILINOGEN UR STRIP-ACNC: 3 MG/DL
WBC # BLD AUTO: 15.56 THOUSAND/UL (ref 4.31–10.16)
WBC #/AREA URNS AUTO: ABNORMAL /HPF

## 2024-10-06 PROCEDURE — 94003 VENT MGMT INPAT SUBQ DAY: CPT

## 2024-10-06 PROCEDURE — 87077 CULTURE AEROBIC IDENTIFY: CPT

## 2024-10-06 PROCEDURE — 94640 AIRWAY INHALATION TREATMENT: CPT

## 2024-10-06 PROCEDURE — 81001 URINALYSIS AUTO W/SCOPE: CPT

## 2024-10-06 PROCEDURE — 94150 VITAL CAPACITY TEST: CPT

## 2024-10-06 PROCEDURE — 87070 CULTURE OTHR SPECIMN AEROBIC: CPT

## 2024-10-06 PROCEDURE — 84300 ASSAY OF URINE SODIUM: CPT

## 2024-10-06 PROCEDURE — 87186 SC STD MICRODIL/AGAR DIL: CPT | Performed by: INTERNAL MEDICINE

## 2024-10-06 PROCEDURE — 87449 NOS EACH ORGANISM AG IA: CPT

## 2024-10-06 PROCEDURE — 80048 BASIC METABOLIC PNL TOTAL CA: CPT

## 2024-10-06 PROCEDURE — 0B9B8ZX DRAINAGE OF LEFT LOWER LOBE BRONCHUS, VIA NATURAL OR ARTIFICIAL OPENING ENDOSCOPIC, DIAGNOSTIC: ICD-10-PCS | Performed by: INTERNAL MEDICINE

## 2024-10-06 PROCEDURE — 84100 ASSAY OF PHOSPHORUS: CPT

## 2024-10-06 PROCEDURE — 87070 CULTURE OTHR SPECIMN AEROBIC: CPT | Performed by: INTERNAL MEDICINE

## 2024-10-06 PROCEDURE — 83935 ASSAY OF URINE OSMOLALITY: CPT

## 2024-10-06 PROCEDURE — 94760 N-INVAS EAR/PLS OXIMETRY 1: CPT

## 2024-10-06 PROCEDURE — 31624 DX BRONCHOSCOPE/LAVAGE: CPT | Performed by: INTERNAL MEDICINE

## 2024-10-06 PROCEDURE — 87186 SC STD MICRODIL/AGAR DIL: CPT

## 2024-10-06 PROCEDURE — 82330 ASSAY OF CALCIUM: CPT

## 2024-10-06 PROCEDURE — 83735 ASSAY OF MAGNESIUM: CPT

## 2024-10-06 PROCEDURE — 83930 ASSAY OF BLOOD OSMOLALITY: CPT

## 2024-10-06 PROCEDURE — 99291 CRITICAL CARE FIRST HOUR: CPT | Performed by: INTERNAL MEDICINE

## 2024-10-06 PROCEDURE — 84145 PROCALCITONIN (PCT): CPT

## 2024-10-06 PROCEDURE — 0B998ZX DRAINAGE OF LINGULA BRONCHUS, VIA NATURAL OR ARTIFICIAL OPENING ENDOSCOPIC, DIAGNOSTIC: ICD-10-PCS | Performed by: INTERNAL MEDICINE

## 2024-10-06 PROCEDURE — 87081 CULTURE SCREEN ONLY: CPT

## 2024-10-06 PROCEDURE — 94664 DEMO&/EVAL PT USE INHALER: CPT

## 2024-10-06 PROCEDURE — 85025 COMPLETE CBC W/AUTO DIFF WBC: CPT

## 2024-10-06 PROCEDURE — 87077 CULTURE AEROBIC IDENTIFY: CPT | Performed by: INTERNAL MEDICINE

## 2024-10-06 PROCEDURE — 89051 BODY FLUID CELL COUNT: CPT | Performed by: INTERNAL MEDICINE

## 2024-10-06 PROCEDURE — 87205 SMEAR GRAM STAIN: CPT | Performed by: INTERNAL MEDICINE

## 2024-10-06 PROCEDURE — 87205 SMEAR GRAM STAIN: CPT

## 2024-10-06 RX ORDER — FENTANYL CITRATE 50 UG/ML
50 INJECTION, SOLUTION INTRAMUSCULAR; INTRAVENOUS ONCE
Status: COMPLETED | OUTPATIENT
Start: 2024-10-06 | End: 2024-10-06

## 2024-10-06 RX ORDER — POTASSIUM CHLORIDE 20MEQ/15ML
40 LIQUID (ML) ORAL ONCE
Status: COMPLETED | OUTPATIENT
Start: 2024-10-06 | End: 2024-10-06

## 2024-10-06 RX ORDER — ATROPINE SULFATE 1 MG/ML
INJECTION, SOLUTION INTRAVENOUS
Status: DISPENSED
Start: 2024-10-06 | End: 2024-10-07

## 2024-10-06 RX ORDER — DEXTROSE MONOHYDRATE AND SODIUM CHLORIDE 5; .45 G/100ML; G/100ML
100 INJECTION, SOLUTION INTRAVENOUS CONTINUOUS
Status: DISCONTINUED | OUTPATIENT
Start: 2024-10-06 | End: 2024-10-06

## 2024-10-06 RX ORDER — POTASSIUM CHLORIDE 29.8 MG/ML
40 INJECTION INTRAVENOUS ONCE
Status: COMPLETED | OUTPATIENT
Start: 2024-10-06 | End: 2024-10-06

## 2024-10-06 RX ORDER — SODIUM CHLORIDE, SODIUM GLUCONATE, SODIUM ACETATE, POTASSIUM CHLORIDE, MAGNESIUM CHLORIDE, SODIUM PHOSPHATE, DIBASIC, AND POTASSIUM PHOSPHATE .53; .5; .37; .037; .03; .012; .00082 G/100ML; G/100ML; G/100ML; G/100ML; G/100ML; G/100ML; G/100ML
50 INJECTION, SOLUTION INTRAVENOUS CONTINUOUS
Status: DISCONTINUED | OUTPATIENT
Start: 2024-10-06 | End: 2024-10-06

## 2024-10-06 RX ORDER — LIDOCAINE HYDROCHLORIDE 20 MG/ML
INJECTION, SOLUTION EPIDURAL; INFILTRATION; INTRACAUDAL; PERINEURAL
Status: COMPLETED
Start: 2024-10-06 | End: 2024-10-06

## 2024-10-06 RX ORDER — CALCIUM GLUCONATE 20 MG/ML
2 INJECTION, SOLUTION INTRAVENOUS ONCE
Status: COMPLETED | OUTPATIENT
Start: 2024-10-06 | End: 2024-10-06

## 2024-10-06 RX ORDER — DEXTROSE MONOHYDRATE, SODIUM CHLORIDE, AND POTASSIUM CHLORIDE 50; 2.98; 4.5 G/1000ML; G/1000ML; G/1000ML
100 INJECTION, SOLUTION INTRAVENOUS CONTINUOUS
Status: DISCONTINUED | OUTPATIENT
Start: 2024-10-06 | End: 2024-10-06

## 2024-10-06 RX ORDER — DEXTROSE MONOHYDRATE, SODIUM CHLORIDE, AND POTASSIUM CHLORIDE 50; 1.49; 4.5 G/1000ML; G/1000ML; G/1000ML
75 INJECTION, SOLUTION INTRAVENOUS CONTINUOUS
Status: DISCONTINUED | OUTPATIENT
Start: 2024-10-06 | End: 2024-10-06

## 2024-10-06 RX ORDER — FENTANYL CITRATE 50 UG/ML
INJECTION, SOLUTION INTRAMUSCULAR; INTRAVENOUS
Status: COMPLETED
Start: 2024-10-06 | End: 2024-10-06

## 2024-10-06 RX ORDER — DEXTROSE MONOHYDRATE 50 MG/ML
100 INJECTION, SOLUTION INTRAVENOUS CONTINUOUS
Status: DISCONTINUED | OUTPATIENT
Start: 2024-10-06 | End: 2024-10-07

## 2024-10-06 RX ORDER — CALCIUM GLUCONATE 20 MG/ML
2 INJECTION, SOLUTION INTRAVENOUS ONCE
Status: DISCONTINUED | OUTPATIENT
Start: 2024-10-06 | End: 2024-10-06

## 2024-10-06 RX ORDER — POTASSIUM CHLORIDE, DEXTROSE MONOHYDRATE 150; 5 MG/100ML; G/100ML
125 INJECTION, SOLUTION INTRAVENOUS CONTINUOUS
Status: DISCONTINUED | OUTPATIENT
Start: 2024-10-06 | End: 2024-10-06

## 2024-10-06 RX ORDER — MIDAZOLAM HYDROCHLORIDE 2 MG/2ML
1 INJECTION, SOLUTION INTRAMUSCULAR; INTRAVENOUS ONCE
Status: COMPLETED | OUTPATIENT
Start: 2024-10-06 | End: 2024-10-06

## 2024-10-06 RX ORDER — SODIUM CHLORIDE FOR INHALATION 3 %
4 VIAL, NEBULIZER (ML) INHALATION
Status: DISCONTINUED | OUTPATIENT
Start: 2024-10-06 | End: 2024-10-08

## 2024-10-06 RX ORDER — FENTANYL CITRATE-0.9 % NACL/PF 10 MCG/ML
50 PLASTIC BAG, INJECTION (ML) INTRAVENOUS CONTINUOUS
Status: DISCONTINUED | OUTPATIENT
Start: 2024-10-06 | End: 2024-10-08

## 2024-10-06 RX ORDER — LEVALBUTEROL INHALATION SOLUTION 1.25 MG/3ML
1.25 SOLUTION RESPIRATORY (INHALATION)
Status: DISCONTINUED | OUTPATIENT
Start: 2024-10-06 | End: 2024-10-08

## 2024-10-06 RX ADMIN — FENTANYL CITRATE 50 MCG: 50 INJECTION INTRAMUSCULAR; INTRAVENOUS at 10:53

## 2024-10-06 RX ADMIN — CHLORHEXIDINE GLUCONATE 15 ML: 1.2 RINSE ORAL at 08:22

## 2024-10-06 RX ADMIN — APIXABAN 5 MG: 5 TABLET, FILM COATED ORAL at 08:10

## 2024-10-06 RX ADMIN — LEVALBUTEROL HYDROCHLORIDE 1.25 MG: 1.25 SOLUTION RESPIRATORY (INHALATION) at 08:43

## 2024-10-06 RX ADMIN — DEXTROSE 75 ML/HR: 5 SOLUTION INTRAVENOUS at 21:14

## 2024-10-06 RX ADMIN — Medication 50 MCG/HR: at 13:12

## 2024-10-06 RX ADMIN — FENTANYL CITRATE 50 MCG: 50 INJECTION, SOLUTION INTRAMUSCULAR; INTRAVENOUS at 10:53

## 2024-10-06 RX ADMIN — LEVALBUTEROL HYDROCHLORIDE 1.25 MG: 1.25 SOLUTION RESPIRATORY (INHALATION) at 19:55

## 2024-10-06 RX ADMIN — CHLORHEXIDINE GLUCONATE 15 ML: 1.2 RINSE ORAL at 20:22

## 2024-10-06 RX ADMIN — LEVALBUTEROL HYDROCHLORIDE 1.25 MG: 1.25 SOLUTION RESPIRATORY (INHALATION) at 13:40

## 2024-10-06 RX ADMIN — DEXTROSE AND POTASSIUM CHLORIDE 75 ML/HR: 5; .15 SOLUTION INTRAVENOUS at 00:47

## 2024-10-06 RX ADMIN — DEXTROSE 250 ML: 5 SOLUTION INTRAVENOUS at 05:18

## 2024-10-06 RX ADMIN — NOREPINEPHRINE BITARTRATE 1 MCG/MIN: 1 INJECTION INTRAVENOUS at 14:13

## 2024-10-06 RX ADMIN — POTASSIUM CHLORIDE 40 MEQ: 1.5 SOLUTION ORAL at 00:47

## 2024-10-06 RX ADMIN — CEFEPIME 2000 MG: 2 INJECTION, POWDER, FOR SOLUTION INTRAVENOUS at 20:22

## 2024-10-06 RX ADMIN — FENTANYL CITRATE 50 MCG: 50 INJECTION INTRAMUSCULAR; INTRAVENOUS at 10:02

## 2024-10-06 RX ADMIN — CALCIUM GLUCONATE 2 G: 20 INJECTION, SOLUTION INTRAVENOUS at 13:11

## 2024-10-06 RX ADMIN — SODIUM CHLORIDE SOLN NEBU 3% 4 ML: 3 NEBU SOLN at 19:55

## 2024-10-06 RX ADMIN — SODIUM CHLORIDE SOLN NEBU 3% 4 ML: 3 NEBU SOLN at 13:40

## 2024-10-06 RX ADMIN — FENTANYL CITRATE 100 MCG: 50 INJECTION, SOLUTION INTRAMUSCULAR; INTRAVENOUS at 12:44

## 2024-10-06 RX ADMIN — VANCOMYCIN HYDROCHLORIDE 1750 MG: 5 INJECTION, POWDER, LYOPHILIZED, FOR SOLUTION INTRAVENOUS at 17:58

## 2024-10-06 RX ADMIN — POTASSIUM & SODIUM PHOSPHATES POWDER PACK 280-160-250 MG 2 PACKET: 280-160-250 PACK at 06:11

## 2024-10-06 RX ADMIN — FENTANYL CITRATE 50 MCG: 50 INJECTION, SOLUTION INTRAMUSCULAR; INTRAVENOUS at 10:02

## 2024-10-06 RX ADMIN — POTASSIUM CHLORIDE 40 MEQ: 29.8 INJECTION, SOLUTION INTRAVENOUS at 14:37

## 2024-10-06 RX ADMIN — CEFEPIME 2000 MG: 2 INJECTION, POWDER, FOR SOLUTION INTRAVENOUS at 08:13

## 2024-10-06 RX ADMIN — APIXABAN 5 MG: 5 TABLET, FILM COATED ORAL at 17:58

## 2024-10-06 RX ADMIN — MIDAZOLAM HYDROCHLORIDE 1 MG: 1 INJECTION, SOLUTION INTRAMUSCULAR; INTRAVENOUS at 10:16

## 2024-10-06 RX ADMIN — LIDOCAINE HYDROCHLORIDE 100 MG: 20 INJECTION, SOLUTION EPIDURAL; INFILTRATION; INTRACAUDAL at 10:53

## 2024-10-06 RX ADMIN — FENTANYL CITRATE 100 MCG: 50 INJECTION INTRAMUSCULAR; INTRAVENOUS at 12:44

## 2024-10-06 RX ADMIN — SODIUM CHLORIDE SOLN NEBU 3% 4 ML: 3 NEBU SOLN at 08:43

## 2024-10-06 RX ADMIN — SODIUM CHLORIDE SOLN NEBU 3% 4 ML: 3 NEBU SOLN at 02:19

## 2024-10-06 RX ADMIN — SODIUM PHOSPHATE, MONOBASIC, MONOHYDRATE AND SODIUM PHOSPHATE, DIBASIC, ANHYDROUS 21 MMOL: 142; 276 INJECTION, SOLUTION INTRAVENOUS at 10:17

## 2024-10-06 RX ADMIN — VANCOMYCIN HYDROCHLORIDE 1750 MG: 5 INJECTION, POWDER, LYOPHILIZED, FOR SOLUTION INTRAVENOUS at 03:55

## 2024-10-06 RX ADMIN — LEVALBUTEROL HYDROCHLORIDE 1.25 MG: 1.25 SOLUTION RESPIRATORY (INHALATION) at 02:19

## 2024-10-06 NOTE — H&P
H&P - Critical Care/ICU   Name: Hemanth Swanson 39 y.o. male I MRN: [id number]  Unit/Bed#: ICU 05 I Date of Admission: 10/5/2024   Date of Service: 10/6/2024 I Hospital Day: 1       Assessment & Plan  Sepsis (HCC)  [facility]: febrile, tachycardic, with leukocytosis and a lactate of 12.  X-ray with left sided opacification, increased secretions from tracheostomy, initial pro killian negative. Received weight based fluids.    Transferred to [facility] for further management.  Repeat lab work upon arrival show persistent leukocytosis and hypernatremia.  Drip Score 5    Plan:  Cefepime and vancomycin  Continue vent support, frequent suctioning  Xopenex and hypertonic saline TID  Follow up blood cultures, UA, urine strep and legionella antigen  Consider bronchoscopy     Chronic respiratory failure with hypoxia and hypercapnia (HCC)  Neuromuscular disease post chemotherapyS/P tracheostomy in 9/2023.  Vent dependent 24/7: current vent settings 14/500/6/60  Last bronchoscopy 6/1 with cultures growing serratia and pseudomonas.  Chest X-ray 10/5: Left sided opacification with possible mucus plugging.      Plan:  Continue vent support  Xopnex and hypertonic saline nebs TID  Consider bronchoscopy  Cefepime and vancomycin    Hypernatremia  Hypernatremia with hyperchloremia, likely in the setting of dehydration.  Received 2 L of NS and 2 L of Is[facility]te at [facility].  Repeat CMP with improvement of Na 175 to 165.    Plan:  Start D5 + K 20 mEq at 75 cc/hr  Urine osmolality and urine sodium  Serum osmolality  Frequent BMP   Disposition: Critical care    History of Present Illness   Hemanth Swanson is a 39 y.o. 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 PE on Eliquis, progressive demyelinating neuromuscular disease trach/peg, vent dependent. Brought to [facility] after he pulled out his peg tube, this was replaced in the ED. He was found to be febrile at 101.9,  "desaturating to the 80's and increased respiratory secretions, chest x-ray showed complete opacification of the left lung. Hemoconcentration of labs likely secondary to dehydration and lactate elevation of 12. Received weight based fluids, lactate normalized. Covid/flu/RSV negative, pro-killian negative. Patient had bronch 6/1/24, cultures grew serretia and pseudomonas.   He arrived to the [place] ICU on vent settings 14/500/6/60 saturations at 94%, Repeat labs significant for leukocytosis, hypernatremia, hyperchloremia and hypokalemia. Patient is non verbal at baseline.    History obtained from chart review and unobtainable from patient due to Non verbal, vent dependent.  Review of Systems: Review of Systems not obtainable due to Non-verbal and baseline.    Historical Information   Past Medical History:  No date: Anxiety  No date: Cancer (HCC)  No date: Depression  09/24/2023: Migration of percutaneous endoscopic gastrostomy (PEG)   tube (HCC)  No date: Psychiatric disorder      Comment:  bipolar Past Surgical History:  01/20/2023: IR BIOPSY LYMPH NODE  6/18/2024: IR GASTROSTOMY (G) TUBE CHECK/CHANGE/REINSERTION/UPSIZE  09/25/2023: IR GASTROSTOMY TUBE PLACEMENT  09/06/2023: IR LUMBAR PUNCTURE  03/14/2023: IR PORT PLACEMENT  12/14/2022: ORCHIECTOMY; Left      Comment:  Procedure: ORCHIECTOMY INGUINAL;  Surgeon: Flip Michael MD;  Location:  MAIN OR;  Service: Urology  09/08/2023: PEG W/TRACHEOSTOMY PLACEMENT; N/A      Comment:  Procedure: TRACHEOSTOMY WITH INSERTION PEG TUBE;                 Surgeon: Rudy Mcmanus MD;  Location: [facility] OR;                 Service: General  No date: TESTICLE SURGERY   Current Outpatient Medications   Medication Instructions    apixaban (ELIQUIS) 5 mg, Per PEG Tube, 2 times daily    Incontinence Supply Disposable (Comfort Shield Adult Diapers) MISC 1 each, Does not apply, 4 times daily    NEEDLE, DISP, 18 G 18G X 1\" MISC Does not apply, 2 times weekly    Oral " "Hygiene Products (Toothette Swabs/Dentifrice) SWAB Mouth/Throat, 3 times daily    oxygen 6 L/min, Inhalation, As needed    sodium chloride (BRONCHO SALINE) inhaler solution 1 spray, Nebulization, Every 8 hours    Syringe/Needle, Disp, (Syringe Luer Slip) 25G X 5/8\" 1 ML MISC Does not apply, 2 times weekly    testosterone cypionate (DEPO-TESTOSTERONE) 100 mg, Intramuscular, 2 times weekly    Allergies   Allergen Reactions    Dog Epithelium Cough, Sneezing and Nasal Congestion      Social History     Tobacco Use    Smoking status: Former     Current packs/day: 0.00     Average packs/day: 0.7 packs/day for 22.7 years (15.0 ttl pk-yrs)     Types: Cigarettes     Start date: 2008     Quit date: 2023     Years since quittin.3    Smokeless tobacco: Never   Vaping Use    Vaping status: Former    Start date: 2018    Quit date: 2023    Substances: Nicotine, THC   Substance Use Topics    Alcohol use: Not Currently     Comment: Former alcoholic. Last use in     Drug use: Not Currently     Types: \"Crack\" cocaine, Marijuana     Comment: Current use medical marijuana. Not currently using crack cocaine.    Family History   Problem Relation Age of Onset    Coronary artery disease Maternal Aunt     Cancer Father     Diabetes Father     Hypertension Father           Objective :                   Vitals I/O      Most Recent Min/Max in 24hrs   Temp 99 °F (37.2 °C) Temp  Min: 99 °F (37.2 °C)  Max: 101.9 °F (38.8 °C)   Pulse 67 Pulse  Min: 66  Max: 116   Resp 14 Resp  Min: 14  Max: 54   /67 BP  Min: 105/67  Max: 151/106   O2 Sat 93 % SpO2  Min: 85 %  Max: 99 %    No intake or output data in the 24 hours ending 10/06/24 0145    Diet NPO    Invasive Monitoring           Physical Exam   Physical Exam  Vitals reviewed.   Eyes:      Comments: Would not allow to open his eyes and squeeze them shut when attempting to examine his eyes.   Skin:     General: Skin is warm.   HENT:      Head: Normocephalic and atraumatic. "   Abdominal: General: Bowel sounds are normal. There is abdominal type feeding tube.There is no distension.      Palpations: Abdomen is soft.      Tenderness: There is no abdominal tenderness.   Constitutional:       Appearance: He is ill-appearing.      Interventions: He is restrained.   Pulmonary:      Effort: No respiratory distress.      Comments: Tracheostomy in place, chronic ventilator dependence.  Decrease breath sounds in the left lung.  Neurological:      Comments: Non-verbal at baseline, would not follow commands, moving all extremities independently.   Genitourinary/Anorectal:  external catheter present.        Diagnostic Studies        Lab Results: I have reviewed the following results:     Medications:  Scheduled PRN   apixaban, 5 mg, BID  cefepime, 2,000 mg, Q12H  chlorhexidine, 15 mL, Q12H ISAEL  levalbuterol, 1.25 mg, TID  sodium chloride, 4 mL, TID  vancomycin, 1,750 mg, Q12H      acetaminophen, 650 mg, Q6H PRN       Continuous    dextrose 5 % with KCl 20 mEq/L, 75 mL/hr, Last Rate: 75 mL/hr (10/06/24 0047)         Labs:   CBC    Recent Labs     10/05/24  1934 10/05/24  2311   WBC 14.76* 15.66*   HGB 18.2* 14.4   HCT 63.2* 48.5    178     BMP    Recent Labs     10/05/24  1934 10/05/24  2312   SODIUM 175* 165*   K 3.3* 3.4*   * 125*   CO2 38* 33*   AGAP 21* 7   BUN 13 11   CREATININE 1.10 0.84   CALCIUM 10.2 8.0*       Coags    Recent Labs     10/05/24  2020   INR 1.63*   PTT 32        Additional Electrolytes  Recent Labs     10/05/24  2311 10/05/24  2312   MG  --  2.6   PHOS  --  3.2   CAIONIZED 1.09*  --           Blood Gas    No recent results  Recent Labs     10/05/24  2316   PHVEN 7.324   EIP5UTZ 60.7*   PO2VEN 45.3*   IUR9BBP 30.9*   BEVEN 3.0   D7URYBV 77.2    LFTs  Recent Labs     10/05/24  1934 10/05/24  2312   ALT 27 19   AST 20 14   ALKPHOS 116* 78   ALB 4.7 3.3*   TBILI 1.06* 0.83       Infectious  Recent Labs     10/05/24  1934   PROCALCITONI 0.14     Glucose  Recent Labs      10/05/24  1934 10/05/24  2312   GLUC 94 105

## 2024-10-06 NOTE — PLAN OF CARE
Problem: Prexisting or High Potential for Compromised Skin Integrity  Goal: Skin integrity is maintained or improved  Description: INTERVENTIONS:  - Identify patients at risk for skin breakdown  - Assess and monitor skin integrity  - Assess and monitor nutrition and hydration status  - Monitor labs   - Assess for incontinence   - Turn and reposition patient  - Assist with mobility/ambulation  - Relieve pressure over bony prominences  - Avoid friction and shearing  - Provide appropriate hygiene as needed including keeping skin clean and dry  - Evaluate need for skin moisturizer/barrier cream  - Collaborate with interdisciplinary team   - Patient/family teaching  - Consider wound care consult   Outcome: Progressing     Problem: PAIN - ADULT  Goal: Verbalizes/displays adequate comfort level or baseline comfort level  Description: Interventions:  - Encourage patient to monitor pain and request assistance  - Assess pain using appropriate pain scale  - Administer analgesics based on type and severity of pain and evaluate response  - Implement non-pharmacological measures as appropriate and evaluate response  - Consider cultural and social influences on pain and pain management  - Notify physician/advanced practitioner if interventions unsuccessful or patient reports new pain  Outcome: Progressing     Problem: INFECTION - ADULT  Goal: Absence or prevention of progression during hospitalization  Description: INTERVENTIONS:  - Assess and monitor for signs and symptoms of infection  - Monitor lab/diagnostic results  - Monitor all insertion sites, i.e. indwelling lines, tubes, and drains  - Monitor endotracheal if appropriate and nasal secretions for changes in amount and color  - [place] appropriate cooling/warming therapies per order  - Administer medications as ordered  - Instruct and encourage patient and family to use good hand hygiene technique  - Identify and instruct in appropriate isolation precautions for  identified infection/condition  Outcome: Progressing  Goal: Absence of fever/infection during neutropenic period  Description: INTERVENTIONS:  - Monitor WBC    Outcome: Progressing     Problem: SAFETY ADULT  Goal: Patient will remain free of falls  Description: INTERVENTIONS:  - Educate patient/family on patient safety including physical limitations  - Instruct patient to call for assistance with activity   - Consult OT/PT to assist with strengthening/mobility   - Keep Call bell within reach  - Keep bed low and locked with side rails adjusted as appropriate  - Keep care items and personal belongings within reach  - Initiate and maintain comfort rounds  - Make Fall Risk Sign visible to staff  - Offer Toileting every  Hours, in advance of need  - Initiate/Maintain alarm  - Obtain necessary fall risk management equipment:   - Apply yellow socks and bracelet for high fall risk patients  - Consider moving patient to room near nurses station  Outcome: Progressing  Goal: Maintain or return to baseline ADL function  Description: INTERVENTIONS:  -  Assess patient's ability to carry out ADLs; assess patient's baseline for ADL function and identify physical deficits which impact ability to perform ADLs (bathing, care of mouth/teeth, toileting, grooming, dressing, etc.)  - Assess/evaluate cause of self-care deficits   - Assess range of motion  - Assess patient's mobility; develop plan if impaired  - Assess patient's need for assistive devices and provide as appropriate  - Encourage maximum independence but intervene and supervise when necessary  - Involve family in performance of ADLs  - Assess for home care needs following discharge   - Consider OT consult to assist with ADL evaluation and planning for discharge  - Provide patient education as appropriate  Outcome: Progressing  Goal: Maintains/Returns to pre admission functional level  Description: INTERVENTIONS:  - Perform AM-PAC 6 Click Basic Mobility/ Daily Activity  assessment daily.  - Set and communicate daily mobility goal to care team and patient/family/caregiver.   - Collaborate with rehabilitation services on mobility goals if consulted  - Perform Range of Motion  times a day.  - Reposition patient every  hours.  - Dangle patient  times a day  - Stand patient  times a day  - Ambulate patient  times a day  - Out of bed to chair  times a day   - Out of bed for meals  times a day  - Out of bed for toileting  - Record patient progress and toleration of activity level   Outcome: Progressing     Problem: DISCHARGE PLANNING  Goal: Discharge to home or other facility with appropriate resources  Description: INTERVENTIONS:  - Identify barriers to discharge w/patient and caregiver  - Arrange for needed discharge resources and transportation as appropriate  - Identify discharge learning needs (meds, wound care, etc.)  - Arrange for interpretive services to assist at discharge as needed  - Refer to Case Management Department for coordinating discharge planning if the patient needs post-hospital services based on physician/advanced practitioner order or complex needs related to functional status, cognitive ability, or social support system  Outcome: Progressing     Problem: Knowledge Deficit  Goal: Patient/family/caregiver demonstrates understanding of disease process, treatment plan, medications, and discharge instructions  Description: Complete learning assessment and assess knowledge base.  Interventions:  - Provide teaching at level of understanding  - Provide teaching via preferred learning methods  Outcome: Progressing

## 2024-10-06 NOTE — RESPIRATORY THERAPY NOTE
Assisted physician on bedside bronch. 30cc of saline was instilled into the patient. Samples collected and sent to the lab. Patient tolerated procedure well.

## 2024-10-06 NOTE — ED NOTES
Transfer Information:    [facility]  [place] [facility]  Dr. Sams accepting  Call report: [phone]     Ankush Hernández RN  10/05/24 2023

## 2024-10-06 NOTE — EMTALA/ACUTE CARE TRANSFER
[facility] EMERGENCY DEPARTMENT  [address] [place] [id number]  Dept: [phone]      EMTALA TRANSFER CONSENT    NAME Hemanth Swanson                                         1985                              MRN [id number]    I have been informed of my rights regarding examination, treatment, and transfer   by Dr. Starr Garcia MD    Benefits: Specialized equipment and/or services available at the receiving facility (Include comment)________________________    Risks: Potential deterioration of medical condition, Increased discomfort during transfer, Possible worsening of condition or death during transfer      Consent for Transfer:  I acknowledge that my medical condition has been evaluated and explained to me by the emergency department physician or other qualified medical person and/or my attending physician, who has recommended that I be transferred to the service of  Accepting Physician: Dr. Sams at Accepting Facility Name, City & State : [facility]. The above potential benefits of such transfer, the potential risks associated with such transfer, and the probable risks of not being transferred have been explained to me, and I fully understand them.  The doctor has explained that, in my case, the benefits of transfer outweigh the risks.  I agree to be transferred.    I authorize the performance of emergency medical procedures and treatments upon me in both transit and upon arrival at the receiving facility.  Additionally, I authorize the release of any and all medical records to the receiving facility and request they be transported with me, if possible.  I understand that the safest mode of transportation during a medical emergency is an ambulance and that the Hospital advocates the use of this mode of transport. Risks of traveling to the receiving facility by car, including absence of medical control, life sustaining equipment, such as oxygen, and medical personnel has been  explained to me and I fully understand them.    (RILEY CORRECT BOX BELOW)  [  ]  I consent to the stated transfer and to be transported by ambulance/helicopter.  [  ]  I consent to the stated transfer, but refuse transportation by ambulance and accept full responsibility for my transportation by car.  I understand the risks of non-ambulance transfers and I exonerate the Hospital and its staff from any deterioration in my condition that results from this refusal.    X___________________________________________    DATE  10/05/24  TIME________  Signature of patient or legally responsible individual signing on patient behalf           RELATIONSHIP TO PATIENT_________________________          Provider Certification    NAME Hemanth Swanson                                         1985                              MRN [id number]    A medical screening exam was performed on the above named patient.  Based on the examination:    Condition Necessitating Transfer The primary encounter diagnosis was Acute on chronic hypoxic respiratory failure (HCC). Diagnoses of Sepsis (HCC) and Dislodged gastrostomy tube were also pertinent to this visit.    Patient Condition: An emergency transfer is being made prior to stabilization due to the need for definitive care and the benefit of transfer outweighs the risk    Reason for Transfer: Level of Care needed not available at this facility    Transfer Requirements: Facility RA   Space available and qualified personnel available for treatment as acknowledged by    Agreed to accept transfer and to provide appropriate medical treatment as acknowledged by       Dr. Sams  Appropriate medical records of the examination and treatment of the patient are provided at the time of transfer   STAFF INITIAL WHEN COMPLETED _______  Transfer will be performed by qualified personnel from    and appropriate transfer equipment as required, including the use of necessary and appropriate life support  measures.    Provider Certification: I have examined the patient and explained the following risks and benefits of being transferred/refusing transfer to the patient/family:         Based on these reasonable risks and benefits to the patient and/or the unborn child(capri), and based upon the information available at the time of the patient’s examination, I certify that the medical benefits reasonably to be expected from the provision of appropriate medical treatments at another medical facility outweigh the increasing risks, if any, to the individual’s medical condition, and in the case of labor to the unborn child, from effecting the transfer.    X____________________________________________ DATE 10/05/24        TIME_______      ORIGINAL - SEND TO MEDICAL RECORDS   COPY - SEND WITH PATIENT DURING TRANSFER

## 2024-10-06 NOTE — ED NOTES
Asked RAFAEL Watson if wanted vbg/abg before placing patient on hospital ventilator. Pa declining.     Fifi Gay V RN  10/05/24 [id number]

## 2024-10-06 NOTE — ED NOTES
Received patient on home ventilator. Pt diaphoretic,  dusky, cap refill sluggish, and 02 86%. Suctioned thick, tan, secretions from patient trach. Pt 02 now 93% Temperature 101.9 axillary. Made RAFAEL Watson aware.      Fifi Gay V RN  10/05/24 1562

## 2024-10-06 NOTE — ASSESSMENT & PLAN NOTE
Hypernatremia with hyperchloremia, likely in the setting of dehydration.  Received 2 L of NS and 2 L of Isolyte at [place].  Repeat CMP with improvement of Na 175 to 165.    Plan:  Start D5 + K 20 mEq at 75 cc/hr  Urine osmolality and urine sodium  Serum osmolality  Frequent BMP

## 2024-10-06 NOTE — ASSESSMENT & PLAN NOTE
-Developed demyelinating and neuromuscular syndrome after completing 3 cycles of chemotherapy in 2023 for testicular cancer(S/P orchiectomy).  -Tracheostomy 9/2023, last trach change 9/6/24 at [facility] ED.      Plan:  Continue ventilator support.  Frequent suctioning

## 2024-10-06 NOTE — ASSESSMENT & PLAN NOTE
Neuromuscular disease post chemotherapyS/P tracheostomy in 9/2023.  Vent dependent 24/7: current vent settings 14/500/6/60  Last bronchoscopy 6/1 with cultures growing serratia and pseudomonas.  Chest X-ray 10/5: Left sided opacification with possible mucus plugging.      Plan:  Continue vent support  Xopnex and hypertonic saline nebs TID  Consider bronchoscopy  Cefepime and vancomycin

## 2024-10-06 NOTE — ASSESSMENT & PLAN NOTE
[place] ED: febrile, tachycardic, with leukocytosis and a lactate of 12.  X-ray with left sided opacification, increased secretions from tracheostomy, initial pro killian negative. Received weight based fluids.    Transferred to [facility] for further management.  Repeat lab work upon arrival show persistent leukocytosis and hypernatremia.  Drip Score 5    Plan:  Cefepime and vancomycin  Continue vent support, frequent suctioning  Xopenex and hypertonic saline TID  Follow up blood cultures, UA, urine strep and legionella antigen  Consider bronchoscopy

## 2024-10-06 NOTE — ASSESSMENT & PLAN NOTE
Hypernatremia with hyperchloremia, likely in the setting of dehydration.  Received 2 L of NS and 2 L of Isolyte at [place].  Repeat CMP with improvement of Na 175 to 165.    Plan:  Start D5 + K 20 mEq at 75 cc/hr  Urine osmolality and urine sodium  Serum osmolality  Frequent BMP

## 2024-10-06 NOTE — ASSESSMENT & PLAN NOTE
[place] ED: febrile, tachycardic, with leukocytosis and a lactate of 12.  X-ray with left sided opacification, increased secretions from tracheostomy, initial pro killian negative. Received weight based fluids.    Transferred to [facility] for further management.  Repeat lab work upon arrival show persistent leukocytosis and hypernatremia.  Drip Score 5    Plan:  Cefepime and vancomycin  Continue vent support, frequent suctioning  Xopenex and hypertonic saline TID  Follow up blood cultures, UA, urine strep and legionella antigen  Consider bronchoscopy

## 2024-10-06 NOTE — PROGRESS NOTES
Hemanth Swanson is a 39 y.o. male who is currently ordered Vancomycin IV with management by the Pharmacy Consult service.  Relevant clinical data and objective / subjective history reviewed.  Vancomycin Assessment:  Indication and Goal AUC/Trough: Pneumonia (goal -600, trough >10)  Micro:     Renal Function:  SCr: 0.84 mg/dL  CrCl: 147 mL/min  Renal replacement: Not on dialysis  Days of Therapy: 2  Current Dose: 2 g IV once loading dose  Vancomycin Plan:  New Dosin mg IV every 12 hours  Estimated AUC: 491 mcg*hr/mL  Estimated Trough: 12.3 mcg/mL  Next Level: 10/7 at 0900  Renal Function Monitoring: Daily BMP and UOP  Pharmacy will continue to follow closely for s/sx of nephrotoxicity, infusion reactions and appropriateness of therapy.  BMP and CBC will be ordered per protocol. We will continue to follow the patient’s culture results and clinical progress daily.    Eden Farley

## 2024-10-07 ENCOUNTER — APPOINTMENT (INPATIENT)
Dept: RADIOLOGY | Facility: HOSPITAL | Age: 39
DRG: 720 | End: 2024-10-07
Payer: COMMERCIAL

## 2024-10-07 PROBLEM — G92.8 TOXIC METABOLIC ENCEPHALOPATHY: Status: ACTIVE | Noted: 2024-10-07

## 2024-10-07 LAB
ANION GAP SERPL CALCULATED.3IONS-SCNC: 4 MMOL/L (ref 4–13)
ANION GAP SERPL CALCULATED.3IONS-SCNC: 6 MMOL/L (ref 4–13)
ANION GAP SERPL CALCULATED.3IONS-SCNC: 6 MMOL/L (ref 4–13)
ANION GAP SERPL CALCULATED.3IONS-SCNC: 7 MMOL/L (ref 4–13)
ATRIAL RATE: 122 BPM
BUN SERPL-MCNC: 6 MG/DL (ref 5–25)
CALCIUM SERPL-MCNC: 8.3 MG/DL (ref 8.4–10.2)
CALCIUM SERPL-MCNC: 8.5 MG/DL (ref 8.4–10.2)
CALCIUM SERPL-MCNC: 8.6 MG/DL (ref 8.4–10.2)
CALCIUM SERPL-MCNC: 8.7 MG/DL (ref 8.4–10.2)
CALCIUM SERPL-MCNC: 8.7 MG/DL (ref 8.4–10.2)
CALCIUM SERPL-MCNC: 8.9 MG/DL (ref 8.4–10.2)
CHLORIDE SERPL-SCNC: 120 MMOL/L (ref 96–108)
CHLORIDE SERPL-SCNC: 121 MMOL/L (ref 96–108)
CHLORIDE SERPL-SCNC: 122 MMOL/L (ref 96–108)
CHLORIDE SERPL-SCNC: 123 MMOL/L (ref 96–108)
CO2 SERPL-SCNC: 30 MMOL/L (ref 21–32)
CO2 SERPL-SCNC: 31 MMOL/L (ref 21–32)
CO2 SERPL-SCNC: 32 MMOL/L (ref 21–32)
CO2 SERPL-SCNC: 32 MMOL/L (ref 21–32)
CREAT SERPL-MCNC: 0.58 MG/DL (ref 0.6–1.3)
CREAT SERPL-MCNC: 0.65 MG/DL (ref 0.6–1.3)
CREAT SERPL-MCNC: 0.73 MG/DL (ref 0.6–1.3)
CREAT SERPL-MCNC: 0.74 MG/DL (ref 0.6–1.3)
CREAT SERPL-MCNC: 0.77 MG/DL (ref 0.6–1.3)
CREAT SERPL-MCNC: 0.81 MG/DL (ref 0.6–1.3)
ERYTHROCYTE [DISTWIDTH] IN BLOOD BY AUTOMATED COUNT: 18.3 % (ref 11.6–15.1)
GFR SERPL CREATININE-BSD FRML MDRD: 113 ML/MIN/1.73SQ M
GFR SERPL CREATININE-BSD FRML MDRD: 115 ML/MIN/1.73SQ M
GFR SERPL CREATININE-BSD FRML MDRD: 117 ML/MIN/1.73SQ M
GFR SERPL CREATININE-BSD FRML MDRD: 118 ML/MIN/1.73SQ M
GFR SERPL CREATININE-BSD FRML MDRD: 124 ML/MIN/1.73SQ M
GFR SERPL CREATININE-BSD FRML MDRD: 130 ML/MIN/1.73SQ M
GLUCOSE SERPL-MCNC: 105 MG/DL (ref 65–140)
GLUCOSE SERPL-MCNC: 109 MG/DL (ref 65–140)
GLUCOSE SERPL-MCNC: 119 MG/DL (ref 65–140)
GLUCOSE SERPL-MCNC: 96 MG/DL (ref 65–140)
GLUCOSE SERPL-MCNC: 96 MG/DL (ref 65–140)
GLUCOSE SERPL-MCNC: 97 MG/DL (ref 65–140)
HCT VFR BLD AUTO: 40.1 % (ref 36.5–49.3)
HGB BLD-MCNC: 12.1 G/DL (ref 12–17)
MAGNESIUM SERPL-MCNC: 2.2 MG/DL (ref 1.9–2.7)
MAGNESIUM SERPL-MCNC: 2.3 MG/DL (ref 1.9–2.7)
MAGNESIUM SERPL-MCNC: 2.4 MG/DL (ref 1.9–2.7)
MCH RBC QN AUTO: 30.6 PG (ref 26.8–34.3)
MCHC RBC AUTO-ENTMCNC: 30.2 G/DL (ref 31.4–37.4)
MCV RBC AUTO: 101 FL (ref 82–98)
MRSA NOSE QL CULT: NORMAL
P AXIS: 40 DEGREES
PHOSPHATE SERPL-MCNC: 1.7 MG/DL (ref 2.7–4.5)
PHOSPHATE SERPL-MCNC: 1.8 MG/DL (ref 2.7–4.5)
PHOSPHATE SERPL-MCNC: 2.4 MG/DL (ref 2.7–4.5)
PLATELET # BLD AUTO: 167 THOUSANDS/UL (ref 149–390)
PMV BLD AUTO: 10.4 FL (ref 8.9–12.7)
POTASSIUM SERPL-SCNC: 3.4 MMOL/L (ref 3.5–5.3)
POTASSIUM SERPL-SCNC: 3.5 MMOL/L (ref 3.5–5.3)
POTASSIUM SERPL-SCNC: 3.9 MMOL/L (ref 3.5–5.3)
POTASSIUM SERPL-SCNC: 4 MMOL/L (ref 3.5–5.3)
POTASSIUM SERPL-SCNC: 4 MMOL/L (ref 3.5–5.3)
POTASSIUM SERPL-SCNC: 4.5 MMOL/L (ref 3.5–5.3)
PR INTERVAL: 124 MS
QRS AXIS: 81 DEGREES
QRSD INTERVAL: 82 MS
QT INTERVAL: 308 MS
QTC INTERVAL: 438 MS
RBC # BLD AUTO: 3.96 MILLION/UL (ref 3.88–5.62)
SODIUM SERPL-SCNC: 156 MMOL/L (ref 135–147)
SODIUM SERPL-SCNC: 158 MMOL/L (ref 135–147)
SODIUM SERPL-SCNC: 158 MMOL/L (ref 135–147)
SODIUM SERPL-SCNC: 159 MMOL/L (ref 135–147)
SODIUM SERPL-SCNC: 159 MMOL/L (ref 135–147)
SODIUM SERPL-SCNC: 160 MMOL/L (ref 135–147)
T WAVE AXIS: -7 DEGREES
VANCOMYCIN SERPL-MCNC: 27.3 UG/ML (ref 10–20)
VENTRICULAR RATE: 122 BPM
WBC # BLD AUTO: 10.55 THOUSAND/UL (ref 4.31–10.16)

## 2024-10-07 PROCEDURE — 83735 ASSAY OF MAGNESIUM: CPT

## 2024-10-07 PROCEDURE — 80048 BASIC METABOLIC PNL TOTAL CA: CPT

## 2024-10-07 PROCEDURE — 94760 N-INVAS EAR/PLS OXIMETRY 1: CPT

## 2024-10-07 PROCEDURE — 94003 VENT MGMT INPAT SUBQ DAY: CPT

## 2024-10-07 PROCEDURE — 71045 X-RAY EXAM CHEST 1 VIEW: CPT

## 2024-10-07 PROCEDURE — 85027 COMPLETE CBC AUTOMATED: CPT

## 2024-10-07 PROCEDURE — 99232 SBSQ HOSP IP/OBS MODERATE 35: CPT | Performed by: STUDENT IN AN ORGANIZED HEALTH CARE EDUCATION/TRAINING PROGRAM

## 2024-10-07 PROCEDURE — 93010 ELECTROCARDIOGRAM REPORT: CPT | Performed by: INTERNAL MEDICINE

## 2024-10-07 PROCEDURE — 94150 VITAL CAPACITY TEST: CPT

## 2024-10-07 PROCEDURE — 84100 ASSAY OF PHOSPHORUS: CPT

## 2024-10-07 PROCEDURE — 80202 ASSAY OF VANCOMYCIN: CPT | Performed by: INTERNAL MEDICINE

## 2024-10-07 PROCEDURE — NC001 PR NO CHARGE: Performed by: STUDENT IN AN ORGANIZED HEALTH CARE EDUCATION/TRAINING PROGRAM

## 2024-10-07 PROCEDURE — 94640 AIRWAY INHALATION TREATMENT: CPT

## 2024-10-07 RX ORDER — DEXTROSE MONOHYDRATE 50 MG/ML
75 INJECTION, SOLUTION INTRAVENOUS CONTINUOUS
Status: DISCONTINUED | OUTPATIENT
Start: 2024-10-07 | End: 2024-10-07

## 2024-10-07 RX ORDER — POTASSIUM CHLORIDE 14.9 MG/ML
20 INJECTION INTRAVENOUS ONCE
Status: COMPLETED | OUTPATIENT
Start: 2024-10-07 | End: 2024-10-07

## 2024-10-07 RX ORDER — POTASSIUM CHLORIDE 29.8 MG/ML
40 INJECTION INTRAVENOUS ONCE
Status: COMPLETED | OUTPATIENT
Start: 2024-10-07 | End: 2024-10-07

## 2024-10-07 RX ORDER — POLYETHYLENE GLYCOL 3350 17 G/17G
17 POWDER, FOR SOLUTION ORAL DAILY
Status: DISCONTINUED | OUTPATIENT
Start: 2024-10-07 | End: 2024-10-10

## 2024-10-07 RX ORDER — FENTANYL CITRATE 50 UG/ML
50 INJECTION, SOLUTION INTRAMUSCULAR; INTRAVENOUS EVERY 2 HOUR PRN
Status: DISCONTINUED | OUTPATIENT
Start: 2024-10-07 | End: 2024-10-12

## 2024-10-07 RX ORDER — LORAZEPAM 2 MG/ML
INJECTION INTRAMUSCULAR
Status: DISPENSED
Start: 2024-10-07 | End: 2024-10-08

## 2024-10-07 RX ADMIN — APIXABAN 5 MG: 5 TABLET, FILM COATED ORAL at 09:48

## 2024-10-07 RX ADMIN — SODIUM CHLORIDE SOLN NEBU 3% 4 ML: 3 NEBU SOLN at 13:18

## 2024-10-07 RX ADMIN — POTASSIUM CHLORIDE 40 MEQ: 29.8 INJECTION, SOLUTION INTRAVENOUS at 07:28

## 2024-10-07 RX ADMIN — Medication 50 MCG/HR: at 03:29

## 2024-10-07 RX ADMIN — LEVALBUTEROL HYDROCHLORIDE 1.25 MG: 1.25 SOLUTION RESPIRATORY (INHALATION) at 07:43

## 2024-10-07 RX ADMIN — LEVALBUTEROL HYDROCHLORIDE 1.25 MG: 1.25 SOLUTION RESPIRATORY (INHALATION) at 13:18

## 2024-10-07 RX ADMIN — POTASSIUM CHLORIDE 20 MEQ: 14.9 INJECTION, SOLUTION INTRAVENOUS at 11:05

## 2024-10-07 RX ADMIN — CHLORHEXIDINE GLUCONATE 15 ML: 1.2 RINSE ORAL at 09:48

## 2024-10-07 RX ADMIN — VANCOMYCIN HYDROCHLORIDE 1750 MG: 5 INJECTION, POWDER, LYOPHILIZED, FOR SOLUTION INTRAVENOUS at 17:28

## 2024-10-07 RX ADMIN — DEXTROSE 75 ML/HR: 5 SOLUTION INTRAVENOUS at 11:14

## 2024-10-07 RX ADMIN — POLYETHYLENE GLYCOL 3350 17 G: 17 POWDER, FOR SOLUTION ORAL at 11:30

## 2024-10-07 RX ADMIN — APIXABAN 5 MG: 5 TABLET, FILM COATED ORAL at 17:44

## 2024-10-07 RX ADMIN — SODIUM CHLORIDE SOLN NEBU 3% 4 ML: 3 NEBU SOLN at 07:43

## 2024-10-07 RX ADMIN — CHLORHEXIDINE GLUCONATE 15 ML: 1.2 RINSE ORAL at 21:00

## 2024-10-07 RX ADMIN — SODIUM PHOSPHATE, MONOBASIC, MONOHYDRATE AND SODIUM PHOSPHATE, DIBASIC, ANHYDROUS 21 MMOL: 142; 276 INJECTION, SOLUTION INTRAVENOUS at 17:30

## 2024-10-07 RX ADMIN — CEFEPIME 2000 MG: 2 INJECTION, POWDER, FOR SOLUTION INTRAVENOUS at 07:27

## 2024-10-07 RX ADMIN — LEVALBUTEROL HYDROCHLORIDE 1.25 MG: 1.25 SOLUTION RESPIRATORY (INHALATION) at 19:41

## 2024-10-07 RX ADMIN — VANCOMYCIN HYDROCHLORIDE 1750 MG: 5 INJECTION, POWDER, LYOPHILIZED, FOR SOLUTION INTRAVENOUS at 04:22

## 2024-10-07 RX ADMIN — Medication 50 MCG/HR: at 23:51

## 2024-10-07 RX ADMIN — CEFEPIME 2000 MG: 2 INJECTION, POWDER, FOR SOLUTION INTRAVENOUS at 19:46

## 2024-10-07 RX ADMIN — SODIUM CHLORIDE SOLN NEBU 3% 4 ML: 3 NEBU SOLN at 19:41

## 2024-10-07 NOTE — ASSESSMENT & PLAN NOTE
[place] ED: febrile, tachycardic, with leukocytosis and a lactate of 12.  X-ray with left sided opacification, increased secretions from tracheostomy, initial pro killian negative. Received weight based fluids.    Transferred to [facility] for further management.  Repeat lab work upon arrival show persistent leukocytosis and hypernatremia.  Drip Score 5    Plan:  Cefepime and vancomycin  Continue vent support, frequent suctioning  Xopenex and hypertonic saline TID  Follow up blood cultures  Urine strep and Legionella negative  Trace ketones in urine

## 2024-10-07 NOTE — UTILIZATION REVIEW
Initial Clinical Review    Admission: Date/Time/Statement:   Admission Orders (From admission, onward)       Ordered        10/05/24 2251  Inpatient Admission  Once                          Orders Placed This Encounter   Procedures    Inpatient Admission     Standing Status:   Standing     Number of Occurrences:   1     Order Specific Question:   Level of Care     Answer:   Critical Care [15]     Order Specific Question:   Estimated length of stay     Answer:   More than 2 Midnights     Order Specific Question:   Certification     Answer:   I certify that inpatient services are medically necessary for this patient for a duration of greater than two midnights. See H&P and MD Progress Notes for additional information about the patient's course of treatment.     ED Arrival Information       Patient not seen in ED                           Initial Presentation: 39 y.o. 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 PE on Eliquis, progressive demyelinating neuromuscular disease trach/peg, vent dependent,Non verbal at baseline. Brought to the [facility] ED after he pulled out his peg tube, peg replaced in  ED.     Found  febrile at 101.9, desaturating to the 80's and increased respiratory secretions, chest x-ray w/ complete opacification of the left lung. Hemoconcentration of labs likely secondary to dehydration and lactate elevation of 12. Received weight based fluids, lactate normalized. Covid/flu/RSV negative, pro-killian negative. S/P bronch 6/1/24, cultures grew serretia and pseudomonas.   Arrived to the [facility] ICU lethargic,  on vent settings 14/500/6/60 saturations at 94%, tachypnea, soft BP becoming hypotensive s/p 4 L IVF @ referring ED,   Labs significant for leukocytosis @ 14.76  hypernatremia, hyperchloremia @ 165 and hypokalemia- 3.4. + ABD feeding tube  Cont IV Cefepime and vancomycin, freq neuro checks, vent support, frequent suctioning, Xopenex and hypertonic saline TID  Follow up blood cultures, UA, urine strep and legionella antigen. Consider bronchoscopy. IVF: D5 + K 20 mEq at 75 cc/hr, Urine osmolality and urine sodium  Serum osmolality, Frequent BMP     Date: 10/6/2024   Day 2: ICU   Neuro more combative, mental status below baseline. Observe w serum NA correction,   Labs AM VBG 7.32/61/45 . Sodium  down to 160. Creatinine came in at 1.1, baseline 0.53. Lactate now 0.8. CBC showing leukocytosis to 15, hemoglobin was overly concentrated to 18.2 on presentation and now 14.3.   Cont vent on elevated settings, IV Pressors, IV Cefepime, IV Vanco add MRSA swab, may be able to bronch today; monitor CR w slow correction of NA & volume administration; stop IVF due to overcorrection BMP Q 4HR. May require 3% NSS if still correcting too rapidly; NPO,     ED Treatment-Medication Administration - No Administrations Displayed (No Start Event Found)       None            Scheduled Medications:  apixaban, 5 mg, Per PEG Tube, BID  Atropine Sulfate, , ,   cefepime, 2,000 mg, Intravenous, Q12H  chlorhexidine, 15 mL, Mouth/Throat, Q12H ISAEL  levalbuterol, 1.25 mg, Nebulization, TID  sodium chloride, 4 mL, Nebulization, TID  vancomycin, 1,750 mg, Intravenous, Q12H      Continuous IV Infusions:  dextrose, 75 mL/hr, Intravenous, Continuous  fentaNYL, 50 mcg/hr, Intravenous, Continuous  norepinephrine, 1-30 mcg/min, Intravenous, Titrated      PRN Meds:  acetaminophen, 650 mg, Oral, Q6H PRN  Atropine Sulfate, , ,       ED Triage Vitals   Temperature Pulse Respirations Blood Pressure SpO2 Pain Score   10/05/24 2300 10/05/24 2252 10/05/24 2252 10/05/24 2252 10/05/24 2248 10/06/24 1002   99 °F (37.2 °C) 92 (!) 54 107/66 94 % Med Not Given for Pain - for MAR use only     Weight (last 2 days)       Date/Time  Weight    10/06/24 0600 99.3 (218.92)    10/05/24 2252 99.1 (218.48)            Vital Signs (last 3 days)       Date/Time Temp Pulse Resp BP MAP (mmHg) SpO2 O2 Device Patient Position - Orthostatic VS [place] Coma Scale Score Pain    10/06/24 2000 -- 50 14 108/66 81 98 % -- -- 9 No Pain    10/06/24 1923 98.7 °F (37.1 °C) 55 14 91/54 66 97 % -- -- -- --    10/06/24 1900 -- 61 16 88/54 65 96 % -- -- -- --    10/06/24 1800 -- 64 34 118/53 77 95 % -- -- -- --    10/06/24 1730 -- 56 14 92/55 68 97 % -- -- -- --    10/06/24 1700 -- 62 14 86/52 63 96 % -- -- -- --    10/06/24 1600 -- 59 19 110/82 90 95 % -- -- 9 --    10/06/24 1436 99.1 °F (37.3 °C) 65 14 112/63 81 -- -- Lying -- --    10/06/24 1417 -- 43 22 135/63 90 85 % -- -- -- --    10/06/24 1416 -- 55 31 116/56 80 99 % -- -- -- --    10/06/24 1400 -- 65 14 77/50 60 97 % -- -- -- --    10/06/24 1340 -- -- -- -- -- 94 % -- -- -- --    10/06/24 1244 -- -- -- -- -- -- -- -- -- Med Not Given for Pain - for MAR use only    10/06/24 1200 -- 62 16 79/52 61 96 % -- -- 9 --    10/06/24 1100 99.4 °F (37.4 °C) 73 14 83/52 63 100 % -- Lying -- --    10/06/24 1053 -- 98 23 95/55 69 98 % -- -- -- Med Not Given for Pain - for MAR use only    10/06/24 1021 -- 89 14 78/50 59 96 % -- -- -- --    10/06/24 1018 -- 82 14 89/58 69 96 % -- -- -- --    10/06/24 1002 -- -- -- -- -- -- -- -- -- Med Not Given for Pain - for MAR use only    10/06/24 1000 -- 76 32 99/60 73 90 % -- -- -- --    10/06/24 0912 -- 86 25 117/67 97 90 % -- -- -- --    10/06/24 0900 -- 72 20 85/54 64 95 % -- -- -- --    10/06/24 0800 -- 80 24 130/59 81 96 % -- -- 9 --    10/06/24 0717 99.4 °F (37.4 °C) 68 19 108/59 79 99 % Ventilator Lying -- --    10/06/24 0713 -- -- -- 108/59 79 -- -- -- -- --    10/06/24 0707 -- -- -- 78/43 55 -- -- -- -- --    10/06/24 0700 99.4 °F (37.4 °C) 62 14 86/51 63 98 % -- -- -- --    10/06/24 0600 -- 78 14 95/53 66 98 % -- -- -- --    10/06/24 0500 -- 64 14 89/53 65 95 % -- -- -- --     10/06/24 0406 -- -- -- -- -- 90 % -- -- -- --    10/06/24 0400 -- -- -- -- -- -- -- -- 6 --    10/06/24 0300 99.2 °F (37.3 °C) 76 16 108/55 76 94 % -- -- -- --    10/06/24 0220 -- -- -- -- -- 95 % -- -- -- --    10/06/24 0040 -- -- -- -- -- -- -- -- 6 --    10/06/24 0000 -- -- -- -- -- -- -- -- 6 --    10/05/24 2300 99 °F (37.2 °C) 67 14 105/67 80 93 % -- -- 6 --    10/05/24 2252 -- 92 54 107/66 80 92 % -- -- -- --    10/05/24 2248 -- -- -- -- -- 94 % -- -- -- --              Pertinent Labs/Diagnostic Test Results:   Radiology:  No orders to display     Cardiology:  No orders to display     GI:  Bronchoscopy   Final Result by Silvio Alas MD (10/06 2336)   All observed locations appeared normal, including the pharynx, larynx,    vocal cords, trachea, main prashant, left lung and right lung.   Excessive, thick and white secretions present in the lingula and LLL;    performed therapeutic aspiration         RECOMMENDATION:   Follow up cultures   Continue to monitor in ICU                 Results from last 7 days   Lab Units 10/06/24  0425 10/05/24  2311 10/05/24  1934   WBC Thousand/uL 15.56* 15.66* 14.76*   HEMOGLOBIN g/dL 14.3 14.4 18.2*   HEMATOCRIT % 48.2 48.5 63.2*   PLATELETS Thousands/uL 177 178 228   TOTAL NEUT ABS Thousands/µL 12.26* 12.34* 10.73*         Results from last 7 days   Lab Units 10/06/24  2032 10/06/24  1616 10/06/24  1235 10/06/24  1221 10/06/24  0810 10/06/24  0425 10/06/24  0148 10/05/24  2312 10/05/24  2311   SODIUM mmol/L 159* 160* 160* 160* 160* 166*   < > 165*  --    POTASSIUM mmol/L 3.6 3.9 3.7 3.2* 3.5 3.7   < > 3.4*  --    CHLORIDE mmol/L 122* 121* 121* 122* 123* 126*   < > 125*  --    CO2 mmol/L 30 32 34* 32 34* 35*   < > 33*  --    ANION GAP mmol/L 7 7 5 6 3* 5   < > 7  --    BUN mg/dL 6 7 8 8 8 9   < > 11  --    CREATININE mg/dL 0.67 0.70 0.73 0.68 0.68 0.82   < > 0.84  --    EGFR ml/min/1.73sq m 122 120 118 122 122 112   < > 111  --    CALCIUM mg/dL 8.7 8.8 8.2* 7.8* 7.8*  "8.2*   < > 8.0*  --    CALCIUM, IONIZED mmol/L  --   --  1.09*  --   --  1.12  --   --  1.09*   MAGNESIUM mg/dL  --   --  2.3  --   --  2.5  --  2.6  --    PHOSPHORUS mg/dL  --   --  2.9  --   --  2.3*  --  3.2  --     < > = values in this interval not displayed.     Results from last 7 days   Lab Units 10/05/24  2312 10/05/24  1934   AST U/L 14 20   ALT U/L 19 27   ALK PHOS U/L 78 116*   TOTAL PROTEIN g/dL 6.3* 8.9*   ALBUMIN g/dL 3.3* 4.7   TOTAL BILIRUBIN mg/dL 0.83 1.06*         Results from last 7 days   Lab Units 10/06/24  2032 10/06/24  1616 10/06/24  1235 10/06/24  1221 10/06/24  0810 10/06/24  0425 10/06/24  0148 10/05/24  2312 10/05/24  1934   GLUCOSE RANDOM mg/dL 100 100 103 102 152* 116 123 105 94     Results from last 7 days   Lab Units 10/06/24  0148   OSMOLALITY, SERUM mmol/*         No results found for: \"BETA-HYDROXYBUTYRATE\"       Results from last 7 days   Lab Units 10/05/24  2316   PH SYLVESTER  7.324   PCO2 SYLVESTER mm Hg 60.7*   PO2 SYLVESTER mm Hg 45.3*   HCO3 SYLVESTER mmol/L 30.9*   BASE EXC SYLVESTER mmol/L 3.0   O2 CONTENT SYLVESTER ml/dL 16.1   O2 HGB, VENOUS % 77.2             Results from last 7 days   Lab Units 10/05/24  2125 10/05/24  1940   HS TNI 0HR ng/L  --  16   HS TNI 2HR ng/L 12  --    HSTNI D2 ng/L -4  --          Results from last 7 days   Lab Units 10/05/24  2020   PROTIME seconds 19.6*   INR  1.63*   PTT seconds 32         Results from last 7 days   Lab Units 10/06/24  0425 10/05/24  1934   PROCALCITONIN ng/ml 0.08 0.14     Results from last 7 days   Lab Units 10/05/24  2125 10/05/24  1934   LACTIC ACID mmol/L 0.8 12.1*                                             Results from last 7 days   Lab Units 10/06/24  0148 10/06/24  0141   OSMOLALITY, SERUM mmol/*  --    OSMO UR mmol/KG  --  910*     Results from last 7 days   Lab Units 10/06/24  0141   CLARITY UA  Turbid   COLOR UA  Yellow   SPEC GRAV UA  1.044*   PH UA  5.5   GLUCOSE UA mg/dl Negative   KETONES UA mg/dl 10 (1+)*   BLOOD UA  Negative "   PROTEIN UA mg/dl 70 (1+)*   NITRITE UA  Negative   BILIRUBIN UA  Negative   UROBILINOGEN UA (BE) mg/dl 3.0*   LEUKOCYTES UA  Trace*   WBC UA /hpf 20-30*   RBC UA /hpf 1-2   BACTERIA UA /hpf None Seen   EPITHELIAL CELLS WET PREP /hpf Occasional   MUCUS THREADS  Innumerable*   SODIUM UR  93     Results from last 7 days   Lab Units 10/06/24  0141   STREP PNEUMONIAE ANTIGEN, URINE  Negative   LEGIONELLA URINARY ANTIGEN  Negative                             Results from last 7 days   Lab Units 10/05/24  2311 10/05/24  1951 10/05/24  1934   BLOOD CULTURE  Received in Microbiology Lab. Culture in Progress.  Received in Microbiology Lab. Culture in Progress. Received in Microbiology Lab. Culture in Progress. Received in Microbiology Lab. Culture in Progress.     Results from last 7 days   Lab Units 10/06/24  1121   TOTAL COUNTED  100               Past Medical History:   Diagnosis Date    Anxiety     Cancer (formerly [facility])     Depression     Migration of percutaneous endoscopic gastrostomy (PEG) tube (formerly [facility]) 09/24/2023    Psychiatric disorder     bipolar     Present on Admission:   Chronic respiratory failure with hypoxia and hypercapnia (formerly [facility])   Sepsis (formerly [facility])      Admitting Diagnosis: Fever [R50.9]  Age/Sex: 39 y.o. male    Network Utilization Review Department  ATTENTION: Please call with any questions or concerns to [phone] and carefully listen to the prompts so that you are directed to the right person. All voicemails are confidential.   For Discharge needs, contact Care Management DC Support Team at [phone] opt. 2  Send all requests for admission clinical reviews, approved or denied determinations and any other requests to dedicated fax number below belonging to the campus where the patient is receiving treatment. List of dedicated fax numbers for the Facilities:  FACILITY NAME UR FAX NUMBER   ADMISSION DENIALS (Administrative/Medical Necessity) [phone]   DISCHARGE SUPPORT TEAM (NETWORK) [phone]   PARENT CHILD  [facility] (Maternity/NICU/Pediatrics) [phone]   [facility] [phone]   [facility] [phone]   [facility] [phone]   [facility] [phone]   [facility] [phone]   [facility] [phone]   [facility] [phone]   [facility] [phone]   [facility] [phone]   [facility] [phone]   [facility] [phone]   [facility] ([place], [place]) [phone]

## 2024-10-07 NOTE — RESPIRATORY THERAPY NOTE
RT Ventilator Management Note  Hemanth Swanson 39 y.o. male MRN: [id number]  Unit/Bed#: ICU 05 Encounter: [id number]      Daily Screen         10/6/2024  0844 10/7/2024  0743          Patient safety screen outcome:: Failed Failed      Not Ready for Weaning due to:: Underline problem not resolved Underline problem not resolved      Spont breathing trial outcome:: -- Failed      Spont breathing trial reason failed: -- --                Physical Exam:   Assessment Type: During-treatment  General Appearance: Sedated  Respiratory Pattern: Assisted  Chest Assessment: Chest expansion symmetrical  Bilateral Breath Sounds: Diminished  Suction: ET Tube, Oral  O2 Device: vent      Resp Comments: Patient recieved on AC/VC settings. Per chart review patients home settings are 12/550 with 100% spont trigger. Patient nebulizers given. Recieved on 60% weaned to 50%. trach tie exchanged and trach care done     10/07/24 0743   Respiratory Assessment   Assessment Type During-treatment   General Appearance Sedated   Respiratory Pattern Assisted   Chest Assessment Chest expansion symmetrical   Bilateral Breath Sounds Diminished   Suction ET Tube;Oral   Resp Comments Patient recieved on AC/VC settings. Per chart review patients home settings are 12/550 with 100% spont trigger. Patient nebulizers given. Recieved on 60% weaned to 50%. trach tie exchanged and trach care done   O2 Device vent   Vent Information   Vent    Vent type     Vent Mode AC/VC   $ Vital Capacity Mech/Peak Flow Yes   $ Pulse Oximetry Spot Check Charge Completed   SpO2 96 %   AC/VC Settings   Resp Rate (BPM) 14 BPM   Vt (mL) 500 mL   FIO2 (%) (S)  50 %  (weaned per FIO2 goal)   PEEP (cmH2O) 6 cmH2O   Flow Pattern (LPM) 45 L/min   Trigger Sensitivity Flow (lpm) 3 %   Humidification Heater   Heater Temperature (Set) 98.6 °F (37 °C)   AC/VC Actuals   Resp Rate (BPM) 14 BPM   VT (mL) 569   MV 7.22   MAP (cmH2O) 11 cmH2O   Peak Pressure (cmH2O) 22 cmH2O   I/E  "Ratio (Obs) 1:2.5   Heater Temperature (Obs) 98.6 °F (37 °C)   Static Compliance (mL/cmH20) 40 mL/cmH2O   Plateau Pressure (cm H2O) 20 cm H2O   AC/VC Alarms   High Peak Pressure (cmH2O) 40   High Resp Rate (BPM) 40 BPM   High MV (L/min) 16 L/min   Low MV (L/min) 3.5 L/min   Vt High (mL) 900 mL   Vt Low (mL) 200 mL   AC/VC Apnea Settings   Resp Rate (BPM) 14 BPM   VT (mL) 500 mL   FIO2 (%) 100 %   Apnea Time (s) 20 S   Apnea Flow (L/min) 45 L/min   Maintenance   Alarm (pink) cable attached Yes   Resuscitation bag with peep valve at bedside Yes   Water bag changed No   Circuit changed No   Daily Screen   Patient safety screen outcome: Failed   Not Ready for Weaning due to: Underline problem not resolved   Spont breathing trial outcome: Failed   Spont breathing trial reason failed   (patient chronic vent)   Adult IBW/VT Calculations   Height 6' 4\" (1.93 m)   IBW (Ideal Body Weight) 86.8 kg   Range VT 6 ML/Kg 520.8 mL/kg   Range VT 8 ML/Kg 694.4 mL/kg   Surgical Airway Distal;Cuffed;Other (Comment)   Placement Date/Time: 06/01/24 1155   Tube Size: 6  Placed By: Physician  Placed by (Name): Dr. Ronquillo  Type: Tracheostomy  Style: (c) Distal;Cuffed;Other (Comment)   Status Cuff Inflated;Secured   Site Assessment Clean;Dry   Site Care Cleansed;Dried;Dressing applied   Inner Cannula Care Changed/new   Ties Assessment (S)  Changed;Secure;Clean;Dry;Intact   Surgical Airway Cuff Pressure (cm H20) 26 cm H2O   Equipment at bedside BVM;Wall Suction setup;Additional complete same size trach tube;Additional complete one size smaller trach tube;Obturator;Sterile saline;Additional inner cannula     "

## 2024-10-07 NOTE — PLAN OF CARE
Problem: Prexisting or High Potential for Compromised Skin Integrity  Goal: Skin integrity is maintained or improved  Description: INTERVENTIONS:  - Identify patients at risk for skin breakdown  - Assess and monitor skin integrity  - Assess and monitor nutrition and hydration status  - Monitor labs   - Assess for incontinence   - Turn and reposition patient  - Assist with mobility/ambulation  - Relieve pressure over bony prominences  - Avoid friction and shearing  - Provide appropriate hygiene as needed including keeping skin clean and dry  - Evaluate need for skin moisturizer/barrier cream  - Collaborate with interdisciplinary team   - Patient/family teaching  - Consider wound care consult   Outcome: Progressing     Problem: PAIN - ADULT  Goal: Verbalizes/displays adequate comfort level or baseline comfort level  Description: Interventions:  - Encourage patient to monitor pain and request assistance  - Assess pain using appropriate pain scale  - Administer analgesics based on type and severity of pain and evaluate response  - Implement non-pharmacological measures as appropriate and evaluate response  - Consider cultural and social influences on pain and pain management  - Notify physician/advanced practitioner if interventions unsuccessful or patient reports new pain  Outcome: Progressing     Problem: INFECTION - ADULT  Goal: Absence or prevention of progression during hospitalization  Description: INTERVENTIONS:  - Assess and monitor for signs and symptoms of infection  - Monitor lab/diagnostic results  - Monitor all insertion sites, i.e. indwelling lines, tubes, and drains  - Monitor endotracheal if appropriate and nasal secretions for changes in amount and color  - [place] appropriate cooling/warming therapies per order  - Administer medications as ordered  - Instruct and encourage patient and family to use good hand hygiene technique  - Identify and instruct in appropriate isolation precautions for  identified infection/condition  Outcome: Progressing  Goal: Absence of fever/infection during neutropenic period  Description: INTERVENTIONS:  - Monitor WBC    Outcome: Progressing     Problem: SAFETY ADULT  Goal: Patient will remain free of falls  Description: INTERVENTIONS:  - Educate patient/family on patient safety including physical limitations  - Instruct patient to call for assistance with activity   - Consult OT/PT to assist with strengthening/mobility   - Keep Call bell within reach  - Keep bed low and locked with side rails adjusted as appropriate  - Keep care items and personal belongings within reach  - Initiate and maintain comfort rounds  - Make Fall Risk Sign visible to staff  - Offer Toileting every  Hours, in advance of need  - Initiate/Maintain alarm  - Obtain necessary fall risk management equipment:   - Apply yellow socks and bracelet for high fall risk patients  - Consider moving patient to room near nurses station  Outcome: Progressing  Goal: Maintain or return to baseline ADL function  Description: INTERVENTIONS:  -  Assess patient's ability to carry out ADLs; assess patient's baseline for ADL function and identify physical deficits which impact ability to perform ADLs (bathing, care of mouth/teeth, toileting, grooming, dressing, etc.)  - Assess/evaluate cause of self-care deficits   - Assess range of motion  - Assess patient's mobility; develop plan if impaired  - Assess patient's need for assistive devices and provide as appropriate  - Encourage maximum independence but intervene and supervise when necessary  - Involve family in performance of ADLs  - Assess for home care needs following discharge   - Consider OT consult to assist with ADL evaluation and planning for discharge  - Provide patient education as appropriate  Outcome: Progressing  Goal: Maintains/Returns to pre admission functional level  Description: INTERVENTIONS:  - Perform AM-PAC 6 Click Basic Mobility/ Daily Activity  assessment daily.  - Set and communicate daily mobility goal to care team and patient/family/caregiver.   - Collaborate with rehabilitation services on mobility goals if consulted  - Perform Range of Motion  times a day.  - Reposition patient every  hours.  - Dangle patient  times a day  - Stand patient  times a day  - Ambulate patient  times a day  - Out of bed to chair  times a day   - Out of bed for meals  times a day  - Out of bed for toileting  - Record patient progress and toleration of activity level   Outcome: Progressing     Problem: DISCHARGE PLANNING  Goal: Discharge to home or other facility with appropriate resources  Description: INTERVENTIONS:  - Identify barriers to discharge w/patient and caregiver  - Arrange for needed discharge resources and transportation as appropriate  - Identify discharge learning needs (meds, wound care, etc.)  - Arrange for interpretive services to assist at discharge as needed  - Refer to Case Management Department for coordinating discharge planning if the patient needs post-hospital services based on physician/advanced practitioner order or complex needs related to functional status, cognitive ability, or social support system  Outcome: Progressing     Problem: Knowledge Deficit  Goal: Patient/family/caregiver demonstrates understanding of disease process, treatment plan, medications, and discharge instructions  Description: Complete learning assessment and assess knowledge base.  Interventions:  - Provide teaching at level of understanding  - Provide teaching via preferred learning methods  Outcome: Progressing     Problem: SAFETY,RESTRAINT: NV/NON-SELF DESTRUCTIVE BEHAVIOR  Goal: Remains free of harm/injury (restraint for non violent/non self-detsructive behavior)  Description: INTERVENTIONS:  - Instruct patient/family regarding restraint use   - Assess and monitor physiologic and psychological status   - Provide interventions and comfort measures to meet assessed  patient needs   - Identify and implement measures to help patient regain control  - Assess readiness for release of restraint   Outcome: Progressing  Goal: Returns to optimal restraint-free functioning  Description: INTERVENTIONS:  - Assess the patient's behavior and symptoms that indicate continued need for restraint  - Identify and implement measures to help patient regain control  - Assess readiness for release of restraint   Outcome: Progressing

## 2024-10-07 NOTE — PLAN OF CARE
Problem: Prexisting or High Potential for Compromised Skin Integrity  Goal: Skin integrity is maintained or improved  Description: INTERVENTIONS:  - Identify patients at risk for skin breakdown  - Assess and monitor skin integrity  - Assess and monitor nutrition and hydration status  - Monitor labs   - Assess for incontinence   - Turn and reposition patient  - Assist with mobility/ambulation  - Relieve pressure over bony prominences  - Avoid friction and shearing  - Provide appropriate hygiene as needed including keeping skin clean and dry  - Evaluate need for skin moisturizer/barrier cream  - Collaborate with interdisciplinary team   - Patient/family teaching  - Consider wound care consult   Outcome: Not Progressing     Problem: PAIN - ADULT  Goal: Verbalizes/displays adequate comfort level or baseline comfort level  Description: Interventions:  - Encourage patient to monitor pain and request assistance  - Assess pain using appropriate pain scale  - Administer analgesics based on type and severity of pain and evaluate response  - Implement non-pharmacological measures as appropriate and evaluate response  - Consider cultural and social influences on pain and pain management  - Notify physician/advanced practitioner if interventions unsuccessful or patient reports new pain  Outcome: Not Progressing     Problem: INFECTION - ADULT  Goal: Absence or prevention of progression during hospitalization  Description: INTERVENTIONS:  - Assess and monitor for signs and symptoms of infection  - Monitor lab/diagnostic results  - Monitor all insertion sites, i.e. indwelling lines, tubes, and drains  - Monitor endotracheal if appropriate and nasal secretions for changes in amount and color  - [place]ivalve appropriate cooling/warming therapies per order  - Administer medications as ordered  - Instruct and encourage patient and family to use good hand hygiene technique  - Identify and instruct in appropriate isolation precautions for  identified infection/condition  Outcome: Not Progressing  Goal: Absence of fever/infection during neutropenic period  Description: INTERVENTIONS:  - Monitor WBC    Outcome: Not Progressing     Problem: SAFETY ADULT  Goal: Patient will remain free of falls  Description: INTERVENTIONS:  - Educate patient/family on patient safety including physical limitations  - Instruct patient to call for assistance with activity   - Consult OT/PT to assist with strengthening/mobility   - Keep Call bell within reach  - Keep bed low and locked with side rails adjusted as appropriate  - Keep care items and personal belongings within reach  - Initiate and maintain comfort rounds  - Make Fall Risk Sign visible to staff  - Offer Toileting every 2 Hours, in advance of need  - Initiate/Maintain bed alarm  - Apply yellow socks and bracelet for high fall risk patients  - Consider moving patient to room near nurses station  Outcome: Not Progressing  Goal: Maintain or return to baseline ADL function  Description: INTERVENTIONS:  -  Assess patient's ability to carry out ADLs; assess patient's baseline for ADL function and identify physical deficits which impact ability to perform ADLs (bathing, care of mouth/teeth, toileting, grooming, dressing, etc.)  - Assess/evaluate cause of self-care deficits   - Assess range of motion  - Assess patient's mobility; develop plan if impaired  - Assess patient's need for assistive devices and provide as appropriate  - Encourage maximum independence but intervene and supervise when necessary  - Involve family in performance of ADLs  - Assess for home care needs following discharge   - Consider OT consult to assist with ADL evaluation and planning for discharge  - Provide patient education as appropriate  Outcome: Not Progressing  Goal: Maintains/Returns to pre admission functional level  Description: INTERVENTIONS:  - Perform AM-PAC 6 Click Basic Mobility/ Daily Activity assessment daily.  - Set and  communicate daily mobility goal to care team and patient/family/caregiver.   - Collaborate with rehabilitation services on mobility goals if consulted  - Perform Range of Motion 3 times a day.  - Reposition patient every 2 hours.  - Dangle patient 3 times a day  - Stand patient 3 times a day  - Ambulate patient 3 times a day  - Out of bed to chair 3 times a day   - Out of bed for meals 3 times a day  - Out of bed for toileting  - Record patient progress and toleration of activity level   Outcome: Not Progressing     Problem: DISCHARGE PLANNING  Goal: Discharge to home or other facility with appropriate resources  Description: INTERVENTIONS:  - Identify barriers to discharge w/patient and caregiver  - Arrange for needed discharge resources and transportation as appropriate  - Identify discharge learning needs (meds, wound care, etc.)  - Arrange for interpretive services to assist at discharge as needed  - Refer to Case Management Department for coordinating discharge planning if the patient needs post-hospital services based on physician/advanced practitioner order or complex needs related to functional status, cognitive ability, or social support system  Outcome: Not Progressing

## 2024-10-07 NOTE — CASE MANAGEMENT
Case Management Assessment & Discharge Planning Note    Patient name Hemanth Swanson  Location ICU 05/ICU 05 MRN [id number]  : 1985 Date 10/7/2024       Current Admission Date: 10/5/2024  Current Admission Diagnosis:Sepsis (HCC)   Patient Active Problem List    Diagnosis Date Noted Date Diagnosed    Toxic metabolic encephalopathy 10/07/2024     Hypernatremia 10/06/2024     Cardiac arrest due to other underlying condition ([facility]) 2024     Seizure-like activity ([facility]) 2024     Bradycardia 2024     Ventilator dependent ([facility]) 2024     Obesity hypoventilation syndrome ([facility]) 10/24/2023     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     Psychophysiological insomnia 10/18/2023     Impaired mobility 2023     Status post tracheostomy ([facility]) 2023     S/P percutaneous endoscopic gastrostomy (PEG) tube placement ([facility]) 2023     Anxiety 2023     Chronic respiratory failure with hypoxia and hypercapnia ([facility]) 2023     Sepsis ([facility]) 2023     Acute pulmonary embolism without acute cor pulmonale ([facility]) 2023     Depression 2023     History of LVH (left ventricular hypertrophy) 2023     Pure hypertriglyceridemia 2023     Unilateral vestibular schwannoma ([facility]) 2023     Port-A-Cath in place 2023     Diplopia 2023     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[facility]) 2023     Umbilical hernia without obstruction and without gangrene 12/10/2022     Tobacco use 12/10/2022     Retroperitoneal lymphadenopathy 12/10/2022       LOS (days): 2  Geometric Mean LOS (GMLOS) (days):   Days to GMLOS:     OBJECTIVE:    Risk of Unplanned Readmission Score: 28.25         Current admission status: Inpatient       Preferred Pharmacy:   [facility]/pharmacy #[id number]  RAFAEL TAVERAS - [facility] [address]  Phone: [phone] Fax: [phone]    Primary Care Provider: Ela Douglas MD    Primary Insurance: [facility]  [employer]  Secondary Insurance:     ASSESSMENT:  Active Health Care Proxies    There are no active Health Care Proxies on file.       Patient Information  Admitted from:: Home  Mental Status: Other (Comment)  During Assessment patient was accompanied by: Not accompanied during assessment  Assessment information provided by:: Sister (Dmitry)  Primary Caregiver: Family  Caregiver's Name:: Dmitry - Pt's sister and Magaly - pt's Aunt  Caregiver's Relationship to Patient:: Family Member  Support Systems: Family members  County of Residence: [place]  What city do you live in?: [place]  Home entry access options. Select all that apply.: Ramp  Type of Current Residence: 2 story home  Upon entering residence, is there a bedroom on the main floor (no further steps)?: Yes  Upon entering residence, is there a bathroom on the main floor (no further steps)?: Yes  Living Arrangements: Lives w/ Family members    Activities of Daily Living Prior to Admission  Functional Status: Total dependent  Completes ADLs independently?: No  Level of ADL dependence: Total Dependent  Ambulates independently?: No  Level of ambulatory dependence: Total Dependent  Does patient use assisted devices?: Yes  Assisted Devices (DME) used: Other (Comment), Wheelchair, Hospital Bed, Wallace lift (Home vent, vest therapy, and electric recliner)  Does patient currently own DME?: Yes  What DME does the patient currently own?: Wheelchair, Other (Comment), Wallace lift, Hospital Bed (Vent, vest therapy, electric recliner)  Does patient have a history of Outpatient Therapy (PT/OT)?: No  Does the patient have a history of Short-Term Rehab?: Yes  Does patient have a history of HHC?: Yes  Does patient currently have HHC?: No    Patient Information Continued  Income Source: SSI/SSD  Does patient have prescription coverage?: Yes  Does patient receive dialysis treatments?: No  Does patient have a history of substance abuse?: Yes  Historical substance use preference:  Alcohol/ETOH, Cocaine, Methamphetamines  Is patient currently in treatment for substance abuse?: N/A - sober  Does patient have a history of Mental Health Diagnosis?: No    Means of Transportation  Means of Transport to Appts:: Other (Comment) (Handicap accessable van via insurance - Family goes on transport to manage vent)    Social Determinants of Health (SDOH)      Flowsheet Row Most Recent Value   Housing Stability    In the last 12 months, was there a time when you were not able to pay the mortgage or rent on time? N   At any time in the past 12 months, were you homeless or living in a shelter (including now)? N   Transportation Needs    In the past 12 months, has lack of transportation kept you from medical appointments or from getting medications? no   In the past 12 months, has lack of transportation kept you from meetings, work, or from getting things needed for daily living? No   Food Insecurity    Within the past 12 months, you worried that your food would run out before you got the money to buy more. Never true   Within the past 12 months, the food you bought just didn't last and you didn't have money to get more. Never true   Utilities    In the past 12 months has the electric, gas, oil, or water company threatened to shut off services in your home? No            DISCHARGE DETAILS:    Discharge planning discussed with:: Pt's sisterDmitry  [place] of Choice: Yes  Comments - [place] of Choice: Return Home    Contacts  Patient Contacts: Dmitry Swanson (sister)  Relationship to Patient:: Family  Contact Method: Phone  Reason/Outcome: Discharge Planning, Continuity of Care, Emergency Contact, Referral    Other Referral/Resources/Interventions Provided:  Interventions: Other (Specify)  Referral Comments: CM spoke with pt's sister, Dmitry. Introduced self/role with dcp. Pt resides with his sister, Dmitry, and his Aunt - Magaly who are his primary caretakers. As per Dmitry they are trained on using pt's vent, trach  care, gtube care, and assist pt with all ADLS. Dmitry reports herself and Magaly are paid through waiver - pt is approved for 20 hours per day.  is: Alexia Higgins [phone]. Pt also has a respiratory therapist through [email or web address]. Dmitry reports that there are other family members in the home: Dmitry's 4 kids (ages 15-23), [place] two grandchildren ( 2 & 1 years old), pt's cousin - Ronit, and his nephew. Dmitry reports pt is dependent for all care. She said more recently they have been able to stand pt and transfer him to a w/c or his electric recliner. She reports it does take more than 1 person. She reports they have the nicky lift at home if needed. She reports that pt is on tube feeds at home as needed. Otherwise he does eat food with family assistance. She reports they have transport arranged via a [employer] van (through pt's insurance) for appointments and family goes with him to manage the VENT. She reports hx of VNA in the past and ideally would like PT/OT in the home again if able. Aware Cm can place referrals and f/u if an agency is identified. Dmitry reports her goal with PT/OT in the home would be for pt to safely transfer to the w/c with a stand/pivot and person assist. She is also interested in seeing if there is any equipment in the home they can utilize to help the pt/family with transfers besides a nicky lift.

## 2024-10-07 NOTE — TELEPHONE ENCOUNTER
Dmitry, patient sister calling with concerns for the Testosterone Injection    She reports that his mood has been slowly changing since starting the testosterone. Combative, tries to rip out his tracheostomy and feed tube. The past 2 days this has been worsening.     She states he was never like this before. He gets his Testosterone 0.5 ml every Tuesday and Friday evening.    Advised sister to hold tonight's dose until she hears from our providers, verbalized understanding.     Please advise    CB #[phone]   
Patient currently hospitalized. Can trial decreasing to 1 time weekly if needed.   
Spoke to Dmitry. Pt is currently hospitalized with pneumonia and Dmitry thinks this may have been causing the change in behavior as well. Advised Dmitry to monitor pt's behaviors once out of the hospital and decrease testosterone to once weekly if she still notices that he is agitated. She is agreeable to this plan. Advised her to call the office with any questions or concerns.   
5

## 2024-10-07 NOTE — PROGRESS NOTES
Hemanth Swanson is a 39 y.o. male who is currently ordered Vancomycin IV with management by the Pharmacy Consult service.  Relevant clinical data and objective / subjective history reviewed.  Vancomycin Assessment:  Indication and Goal AUC/Trough: Pneumonia (goal -600, trough >10)  Clinical Status:  critical care  Micro:     Renal Function:  SCr: 0.58 mg/dL  CrCl: 214 mL/min  Renal replacement: Not on dialysis  Days of Therapy: 3  Current Dose: 1750 mg IV every 12 hours  Vancomycin Plan:  New Dosing: continue current dosing  Estimated AUC: 545 mcg*hr/mL  Estimated Trough: 13.8 mcg/mL  Next Level: 10/14 @ 0900  Renal Function Monitoring: Daily BMP and UOP  Pharmacy will continue to follow closely for s/sx of nephrotoxicity, infusion reactions and appropriateness of therapy.  BMP and CBC will be ordered per protocol. We will continue to follow the patient’s culture results and clinical progress daily.    Aura Sousa, Pharmacist

## 2024-10-07 NOTE — CONSULTS
Recommend continuous tube feeds of Jevity 1.5 @ 70 ml/hr. Water flushes of 150 ml q 4 hrs. Start at 20 ml/hr, advance by 10 ml q 4 hrs as tolerated until goal rate is reached. Defer additional water flush adjustments to critical care d/t hypernatremia.    At goal EN regimen provides 2520 kcals, 107 g protein, and 2177 ml total water from formula + flushes.

## 2024-10-07 NOTE — ASSESSMENT & PLAN NOTE
Hypernatremia with hyperchloremia, likely in the setting of dehydration.  Received 2 L of NS and 2 L of Isolyte at [place].  Repeat CMP with improvement of Na 175 to 165.    Plan:  Start D5 + K 20 mEq at 75 cc/hr  Urine osmolality elevated at 910  Urine sodium within normal limits at 93  Serum osmolality elevated at 345  Frequent BMP every 4 hours

## 2024-10-07 NOTE — ASSESSMENT & PLAN NOTE
Neuromuscular disease post chemotherapyS/P tracheostomy in 9/2023.  Vent dependent 24/7: current vent settings 14/500/6/60  Last bronchoscopy 6/1 with cultures growing serratia and pseudomonas.  Chest X-ray 10/5: Left sided opacification with possible mucus plugging.      Plan:  Continue vent support  Xopnex and hypertonic saline nebs TID  Bronch significant for gram-positive and gram-negative rods.  Blood cultures pending  MRSA pending  Cefepime and vancomycin -continue and de-escalate antibiotics as needed

## 2024-10-07 NOTE — PROGRESS NOTES
Critical Care Attending Note; John Sutherland   Note Date: 10/07/24  Note Time: 10:14 AM    Patient: Hemanth Swanson  Age, : 39 y.o., 1985 MRN: [id number] Code Status: Level 1 - Full Code Patient Location: ICU 05/ICU 05   Hospital LOS:2 days  ]   Patient seen and examined, medical record reviewed, discussed with house staff and nursing staff.     HPI   CC: Hypernatremia  39M with a PMH Seminoma(Orcetomy/Chemo), HTN, HFpEF, DM, Neuromuscular Disorder(Trach/Peg),   Who presents after removing peg tube found to have aspiration and hypernatremia.     Key Recent Events   10/5: Admitted  10/6: Bronch  Main ICU Plans:       #Neuro  AMS/Neuromuscular disorder - Baseline declining over time- Plan to correct metabolic derrangments to look for improvement     #Card  Bradycardia - Recurrent Vagal? - Previously described due to mucous plugging and hypoxia.Possible neuro reflex vs mucous plugging. Plan to optimize respiratory status.    HFpEF   - Euvolemic     Septic Shock - Component of vaso plegia  - Levophed - MAP > 65mmHg  #Pulm  Acute Respiratory Failure - Secondary to mucous plug/Pneumonia - Trach  - LTVV VC - 6-8cc/kg IDBW  - Wean FiO2 - Maintain Oxygen Sat >92%  - VAP Bundle  - HOB > 30o    - PEEP Titrate  - Trach care  - Xopenex/3% Hypertonic saline/Chest PT    Pulmonary Embolism   - Eliquis     #Renal  Hypernatremia - Goal 10-12 - Overcorrected - Goal 165   - Hold D5W   - Start 1.8% saline      #GI  Bowel Regimen - Mirilax    #ID   Pnemonia  - Vanc/Cefepime-  - Follow up Infectious     #Endo  DM  -- ISS      #DVT/GI ppx  Apixaban    #Lines/Tubes/Drains:   Invasive Devices       Central Venous Catheter Line  Duration             Port A Cath 23 Right Subclavian 573 days              Peripheral Intravenous Line  Duration             Peripheral IV 10/05/24 Right Antecubital 1 day    Peripheral IV 10/06/24 Left;Ventral (anterior) Forearm <1 day              Drain  Duration              Gastrostomy/Enterostomy Percutaneous Interventional Gastrostomy 18 Fr.  days              Airway  Duration             Surgical Airway Distal;Cuffed;Other (Comment) 127 days                    #Nutrition:   Diet NPO        #Code Status:   Level 1 - Full Code    #Dispo:   ICU        Jonh Sutherland MD  Pulmonary, Critical Care    Critical care time, excluding procedures, teaching, family meetings, and excludes any prior time recorded by the AP/resident, 35 minutes. Upon my evaluation, this patient has a high probability of imminent or life-threatening deterioration due to above problems which required my direct attention, intervention, and personal management.   Impression/Active Problems:    HTN  HFpEF  DM   Neuromuscualr disorder   Tracheostomy   PEG   Hypernatremia   Aspiration   Vaso vagal       Physical Exam:     Vital Signs:   Weight: 98.8 kg (217 lb 13 oz)  IBW: Ideal body weight: 86.8 kg (191 lb 5.7 oz)  Adjusted ideal body weight: 91.6 kg (201 lb 15.1 oz)  Temp:  [98.7 °F (37.1 °C)-99.5 °F (37.5 °C)] 99.4 °F (37.4 °C)  HR:  [43-98] 54  BP: ()/(50-82) 97/56  Resp:  [14-34] 21  SpO2:  [82 %-100 %] 98 %  O2 Device: Ventilator  FiO2 (%):  [40-50] 50  General: NAD  Neuro: Min responsive  Heart: RRR  Lungs: Diminished Bases  Abdomen: Soft ND  Extremities: Min edema                Ventilator Settings:     Vent Mode: AC/VC  FiO2 (%):  [40-50] 50    Results from last 7 days   Lab Units 10/05/24  2316   PO2 SYLVESTER mm Hg 45.3*     Radiologic Images Reviewed:   MRI 6/16  No acute intracranial pathology.  Stable mild nonspecific white matter changes likely due to chronic microangiopathy.    CXR   White Out Left   Input / Output:     Intake/Output Summary (Last 24 hours) at 10/7/2024 1014  Last data filed at 10/7/2024 1000  Gross per 24 hour   Intake 2552.01 ml   Output 247 ml   Net 2305.01 ml            Infusions:  fentaNYL, 50 mcg/hr, Last Rate: 50 mcg/hr (10/07/24 0329)  norepinephrine, 1-30 mcg/min,  "Last Rate: 1 mcg/min (10/07/24 0806)      Scheduled Medications:  Current Facility-Administered Medications   Medication Dose Route Frequency Provider Last Rate    acetaminophen  650 mg Oral Q6H PRN Mohsen Moon MD      apixaban  5 mg Per PEG Tube BID Mohsen Moon MD      cefepime  2,000 mg Intravenous Q12H Mohsen Moon MD Stopped (10/07/24 0807)    chlorhexidine  15 mL Mouth/Throat Q12H ISAEL Mohsen Moon MD      fentaNYL  50 mcg/hr Intravenous Continuous Lesvianagi Wood PA-C 50 mcg/hr (10/07/24 0329)    levalbuterol  1.25 mg Nebulization TID Mohsen Moon MD      norepinephrine  1-30 mcg/min Intravenous Titrated Silvio Alas MD 1 mcg/min (10/07/24 0806)    potassium chloride  40 mEq Intravenous Once Ne Low MD 40 mEq (10/07/24 0728)    Followed by    potassium chloride  20 mEq Intravenous Once Ne Low MD      sodium chloride  4 mL Nebulization TID Mohsen Moon MD      vancomycin  1,750 mg Intravenous Q12H El Sams MD Stopped (10/07/24 0800)       PRN Medications:    acetaminophen    Labs Reviewed:  Results from last 7 days   Lab Units 10/07/24  0422 10/06/24  0425 10/05/24  2311   WBC Thousand/uL 10.55* 15.56* 15.66*   HEMOGLOBIN g/dL 12.1 14.3 14.4   HEMATOCRIT % 40.1 48.2 48.5   PLATELETS Thousands/uL 167 177 178      Results from last 7 days   Lab Units 10/07/24  0422 10/06/24  2351 10/06/24  2032 10/06/24  1616 10/06/24  1235 10/06/24  0810 10/06/24  0425   SODIUM mmol/L 156* 158* 159*   < > 160*   < > 166*   CO2 mmol/L 30 30 30   < > 34*   < > 35*   BUN mg/dL 6 6 6   < > 8   < > 9   CALCIUM mg/dL 8.3* 8.5 8.7   < > 8.2*   < > 8.2*   MAGNESIUM mg/dL 2.2  --   --   --  2.3  --  2.5   PHOSPHORUS mg/dL 1.7*  --   --   --  2.9  --  2.3*    < > = values in this interval not displayed.         Invalid input(s): \"ASTSGOT\", \"ALTSGPT\"LABRCNTIP@ ,alkphos:3,tbilirubin:3,dbilirubin:3)@  Results from last 7 days   Lab Units 10/05/24  2020   INR  1.63*       " "    Invalid input(s): \"TROPT\", \"PBNP\"             I have personally seen and examined the patient on (10/07/24 between 5180-6789). I discussed the patient with the AP/resident including, but not limited to, verifying findings; reviewing labs and x-rays; discussing with consultants; developing the plan of care with the bedside nurse; and discussing treatment plan with patient or surrogate.  I have reviewed the note and assessment performed by the AP/resident and agree with the AP/resident’s documented findings and plan of care with the above additions/exceptions. Please see my comments for details and adjustments.                 "

## 2024-10-07 NOTE — PROGRESS NOTES
Progress Note - Critical Care/ICU   Name: Hemanth Swanson 39 y.o. male I MRN: [id number]  Unit/Bed#: ICU 05 I Date of Admission: 10/5/2024   Date of Service: 10/7/2024 I Hospital Day: 2      Assessment & Plan  Sepsis (HCC)  [place] ED: febrile, tachycardic, with leukocytosis and a lactate of 12.  X-ray with left sided opacification, increased secretions from tracheostomy, initial pro killian negative. Received weight based fluids.    Transferred to [facility] for further management.  Repeat lab work upon arrival show persistent leukocytosis and hypernatremia.  Drip Score 5    Plan:  Cefepime and vancomycin  Continue vent support, frequent suctioning  Xopenex and hypertonic saline TID  Follow up blood cultures  Urine strep and Legionella negative  Trace ketones in urine    Chronic respiratory failure with hypoxia and hypercapnia (HCC)  Neuromuscular disease post chemotherapyS/P tracheostomy in 9/2023.  Vent dependent 24/7: current vent settings 14/500/6/60  Last bronchoscopy 6/1 with cultures growing serratia and pseudomonas.  Chest X-ray 10/5: Left sided opacification with possible mucus plugging.      Plan:  Continue vent support  Xopnex and hypertonic saline nebs TID  Bronch significant for gram-positive and gram-negative rods.  Blood cultures pending  MRSA pending  Cefepime and vancomycin -continue and de-escalate antibiotics as needed    Hypernatremia  Hypernatremia with hyperchloremia, likely in the setting of dehydration.  Received 2 L of NS and 2 L of Isolyte at [place].  Repeat CMP with improvement of Na 175 to 165.    Plan:  Start D5 + K 20 mEq at 75 cc/hr  Urine osmolality elevated at 910  Urine sodium within normal limits at 93  Serum osmolality elevated at 345  Frequent BMP every 4 hours  Disposition: Critical care    ICU Core Measures     Vented Patient  VAP Bundle  VAP bundle ordered     A: Assess, Prevent, and Manage Pain Has pain been assessed? Yes  Need for changes to pain regimen? No   B: Both  Spontaneous Awakening Trials (SATs) and Spontaneous Breathing Trials (SBTs) Plan to perform spontaneous awakening trial today? Yes   Plan to perform spontaneous breathing trial today? N/A trach  Obvious barriers to extubation? Yes   C: Choice of Sedation RASS Goal: -1 Drowsy or 0 Alert and Calm  Need for changes to sedation or analgesia regimen? No   D: Delirium CAM-ICU: Negative   E: Early Mobility  Plan for early mobility? No   F: Family Engagement Plan for family engagement today? Yes       Antibiotic Review: Patient on appropriate coverage based on culture data.     Review of Invasive Devices:      Central access plan: Hemodynamic monitoring      Prophylaxis:  VTE VTE covered by:  apixaban, Per PEG Tube, 5 mg at 10/06/24 1758       Stress Ulcer  not ordered         24 Hour Events : Patient continues to have multiple episodes of bradycardia especially during coughing episodes with pulse going into the low 40s.  After a period of 15 to 20 seconds patient typically self corrects and has heart rate in the 60s which is about his baseline.  Currently on day 2 of cefepime and Vanco due to left lower lobe pneumonia.  Patient previously presented with a sodium of 175 on admission.  Currently 156 today.  Will stop D5 and continue to monitor BMP every 4 hours.  Will also consider starting tube feeds today 10/7.  Respiratory will compare patient's current respiration settings on mechanical ventilator with his at home settings.    Subjective   Review of Systems: See HPI for Review of Systems    Objective :                   Vitals I/O      Most Recent Min/Max in 24hrs   Temp 99.4 °F (37.4 °C) Temp  Min: 98.7 °F (37.1 °C)  Max: 99.5 °F (37.5 °C)   Pulse 69 Pulse  Min: 43  Max: 98   Resp 14 Resp  Min: 14  Max: 34   BP (!) 86/51 BP  Min: 77/50  Max: 135/63   O2 Sat 95 % SpO2  Min: 85 %  Max: 100 %      Intake/Output Summary (Last 24 hours) at 10/7/2024 0826  Last data filed at 10/7/2024 0807  Gross per 24 hour   Intake 2776.04  ml   Output 247 ml   Net 2529.04 ml       Diet NPO    Invasive Monitoring           Physical Exam   Physical Exam  Eyes:      Conjunctiva/sclera: Conjunctivae normal.   Skin:     General: Skin is warm and dry.   HENT:      Head: Normocephalic and atraumatic.      Right Ear: Hearing normal.      Left Ear: Hearing normal.      Nose: No congestion.      Mouth/Throat:      Mouth: No angioedema.   Neck:   no midline tenderness Cardiovascular:      Rate and Rhythm: Normal rate.   Musculoskeletal:         General: No deformity or signs of injury.      Right lower leg: No edema.      Left lower leg: No edema.   Abdominal:      Palpations: Abdomen is soft.   Constitutional:       General: He is not in acute distress.  Pulmonary:      Effort: No respiratory distress.   Neurological:      Mental Status: He is alert.          Diagnostic Studies        Lab Results: I have reviewed the following results:     Medications:  Scheduled PRN   apixaban, 5 mg, BID  cefepime, 2,000 mg, Q12H  chlorhexidine, 15 mL, Q12H ISAEL  levalbuterol, 1.25 mg, TID  potassium chloride, 40 mEq, Once   Followed by  potassium chloride, 20 mEq, Once  sodium chloride, 4 mL, TID  vancomycin, 1,750 mg, Q12H      acetaminophen, 650 mg, Q6H PRN       Continuous    fentaNYL, 50 mcg/hr, Last Rate: 50 mcg/hr (10/07/24 0329)  norepinephrine, 1-30 mcg/min, Last Rate: 1 mcg/min (10/07/24 0806)         Labs:   CBC    Recent Labs     10/06/24  0425 10/07/24  0422   WBC 15.56* 10.55*   HGB 14.3 12.1   HCT 48.2 40.1    167     BMP    Recent Labs     10/06/24  2351 10/07/24  0422   SODIUM 158* 156*   K 3.5 3.4*   * 120*   CO2 30 30   AGAP 7 6   BUN 6 6   CREATININE 0.65 0.58*   CALCIUM 8.5 8.3*       Coags    Recent Labs     10/05/24  2020   INR 1.63*   PTT 32        Additional Electrolytes  Recent Labs     10/06/24  0425 10/06/24  1235 10/07/24  0422   MG 2.5 2.3 2.2   PHOS 2.3* 2.9 1.7*   CAIONIZED 1.12 1.09*  --           Blood Gas    No recent  results  Recent Labs     10/05/24  2316   PHVEN 7.324   [id number] 60.7*   PO2VEN 45.3*   YEY0BEQ 30.9*   BEVEN 3.0   [id number] 77.2    LFTs  Recent Labs     10/05/24  1934 10/05/24  2312   ALT 27 19   AST 20 14   ALKPHOS 116* 78   ALB 4.7 3.3*   TBILI 1.06* 0.83       Infectious  Recent Labs     10/05/24  1934 10/06/24  0425   PROCALCITONI 0.14 0.08     Glucose  Recent Labs     10/06/24  1616 10/06/24  2032 10/06/24  2351 10/07/24  0422   GLUC 100 100 109 119

## 2024-10-08 LAB
ALBUMIN SERPL BCG-MCNC: 2.7 G/DL (ref 3.5–5)
ALP SERPL-CCNC: 72 U/L (ref 34–104)
ALT SERPL W P-5'-P-CCNC: 11 U/L (ref 7–52)
ANION GAP SERPL CALCULATED.3IONS-SCNC: 3 MMOL/L (ref 4–13)
ANION GAP SERPL CALCULATED.3IONS-SCNC: 3 MMOL/L (ref 4–13)
ANION GAP SERPL CALCULATED.3IONS-SCNC: 4 MMOL/L (ref 4–13)
ANION GAP SERPL CALCULATED.3IONS-SCNC: 6 MMOL/L (ref 4–13)
ANION GAP SERPL CALCULATED.3IONS-SCNC: 7 MMOL/L (ref 4–13)
APTT PPP: 31 SECONDS (ref 23–34)
AST SERPL W P-5'-P-CCNC: 12 U/L (ref 13–39)
BASOPHILS # BLD AUTO: 0.04 THOUSANDS/ΜL (ref 0–0.1)
BASOPHILS NFR BLD AUTO: 1 % (ref 0–1)
BILIRUB SERPL-MCNC: 0.79 MG/DL (ref 0.2–1)
BUN SERPL-MCNC: 5 MG/DL (ref 5–25)
BUN SERPL-MCNC: 6 MG/DL (ref 5–25)
BUN SERPL-MCNC: 7 MG/DL (ref 5–25)
CA-I BLD-SCNC: 1.1 MMOL/L (ref 1.12–1.32)
CALCIUM ALBUM COR SERPL-MCNC: 8.5 MG/DL (ref 8.3–10.1)
CALCIUM SERPL-MCNC: 7.5 MG/DL (ref 8.4–10.2)
CALCIUM SERPL-MCNC: 8.7 MG/DL (ref 8.4–10.2)
CALCIUM SERPL-MCNC: 8.7 MG/DL (ref 8.4–10.2)
CALCIUM SERPL-MCNC: 8.8 MG/DL (ref 8.4–10.2)
CALCIUM SERPL-MCNC: 8.8 MG/DL (ref 8.4–10.2)
CHLORIDE SERPL-SCNC: 120 MMOL/L (ref 96–108)
CHLORIDE SERPL-SCNC: 121 MMOL/L (ref 96–108)
CHLORIDE SERPL-SCNC: 122 MMOL/L (ref 96–108)
CHLORIDE SERPL-SCNC: 123 MMOL/L (ref 96–108)
CHLORIDE SERPL-SCNC: 124 MMOL/L (ref 96–108)
CO2 SERPL-SCNC: 28 MMOL/L (ref 21–32)
CO2 SERPL-SCNC: 32 MMOL/L (ref 21–32)
CO2 SERPL-SCNC: 33 MMOL/L (ref 21–32)
CO2 SERPL-SCNC: 33 MMOL/L (ref 21–32)
CO2 SERPL-SCNC: 34 MMOL/L (ref 21–32)
CREAT SERPL-MCNC: 0.61 MG/DL (ref 0.6–1.3)
CREAT SERPL-MCNC: 0.68 MG/DL (ref 0.6–1.3)
CREAT SERPL-MCNC: 0.69 MG/DL (ref 0.6–1.3)
CREAT SERPL-MCNC: 0.77 MG/DL (ref 0.6–1.3)
CREAT SERPL-MCNC: 0.8 MG/DL (ref 0.6–1.3)
EOSINOPHIL # BLD AUTO: 0.25 THOUSAND/ΜL (ref 0–0.61)
EOSINOPHIL NFR BLD AUTO: 3 % (ref 0–6)
ERYTHROCYTE [DISTWIDTH] IN BLOOD BY AUTOMATED COUNT: 18.3 % (ref 11.6–15.1)
GFR SERPL CREATININE-BSD FRML MDRD: 114 ML/MIN/1.73SQ M
GFR SERPL CREATININE-BSD FRML MDRD: 115 ML/MIN/1.73SQ M
GFR SERPL CREATININE-BSD FRML MDRD: 121 ML/MIN/1.73SQ M
GFR SERPL CREATININE-BSD FRML MDRD: 122 ML/MIN/1.73SQ M
GFR SERPL CREATININE-BSD FRML MDRD: 127 ML/MIN/1.73SQ M
GLUCOSE SERPL-MCNC: 109 MG/DL (ref 65–140)
GLUCOSE SERPL-MCNC: 152 MG/DL (ref 65–140)
GLUCOSE SERPL-MCNC: 90 MG/DL (ref 65–140)
GLUCOSE SERPL-MCNC: 92 MG/DL (ref 65–140)
GLUCOSE SERPL-MCNC: 97 MG/DL (ref 65–140)
HCT VFR BLD AUTO: 37.6 % (ref 36.5–49.3)
HGB BLD-MCNC: 11.2 G/DL (ref 12–17)
IMM GRANULOCYTES # BLD AUTO: 0.06 THOUSAND/UL (ref 0–0.2)
IMM GRANULOCYTES NFR BLD AUTO: 1 % (ref 0–2)
INR PPP: 1.46 (ref 0.85–1.19)
LYMPHOCYTES # BLD AUTO: 1.14 THOUSANDS/ΜL (ref 0.6–4.47)
LYMPHOCYTES NFR BLD AUTO: 14 % (ref 14–44)
MAGNESIUM SERPL-MCNC: 2.4 MG/DL (ref 1.9–2.7)
MCH RBC QN AUTO: 30.8 PG (ref 26.8–34.3)
MCHC RBC AUTO-ENTMCNC: 29.8 G/DL (ref 31.4–37.4)
MCV RBC AUTO: 103 FL (ref 82–98)
MONOCYTES # BLD AUTO: 0.55 THOUSAND/ΜL (ref 0.17–1.22)
MONOCYTES NFR BLD AUTO: 7 % (ref 4–12)
NEUTROPHILS # BLD AUTO: 6.36 THOUSANDS/ΜL (ref 1.85–7.62)
NEUTS SEG NFR BLD AUTO: 74 % (ref 43–75)
NRBC BLD AUTO-RTO: 0 /100 WBCS
PHOSPHATE SERPL-MCNC: 3.1 MG/DL (ref 2.7–4.5)
PLATELET # BLD AUTO: 146 THOUSANDS/UL (ref 149–390)
PMV BLD AUTO: 10.9 FL (ref 8.9–12.7)
POTASSIUM SERPL-SCNC: 2.9 MMOL/L (ref 3.5–5.3)
POTASSIUM SERPL-SCNC: 4 MMOL/L (ref 3.5–5.3)
POTASSIUM SERPL-SCNC: 4 MMOL/L (ref 3.5–5.3)
POTASSIUM SERPL-SCNC: 4.1 MMOL/L (ref 3.5–5.3)
POTASSIUM SERPL-SCNC: 4.1 MMOL/L (ref 3.5–5.3)
PROT SERPL-MCNC: 5.2 G/DL (ref 6.4–8.4)
PROTHROMBIN TIME: 18.4 SECONDS (ref 12.3–15)
RBC # BLD AUTO: 3.64 MILLION/UL (ref 3.88–5.62)
SODIUM SERPL-SCNC: 157 MMOL/L (ref 135–147)
SODIUM SERPL-SCNC: 158 MMOL/L (ref 135–147)
SODIUM SERPL-SCNC: 159 MMOL/L (ref 135–147)
SODIUM SERPL-SCNC: 159 MMOL/L (ref 135–147)
SODIUM SERPL-SCNC: 160 MMOL/L (ref 135–147)
TSH SERPL DL<=0.05 MIU/L-ACNC: 3.18 UIU/ML (ref 0.45–4.5)
WBC # BLD AUTO: 8.4 THOUSAND/UL (ref 4.31–10.16)

## 2024-10-08 PROCEDURE — 84443 ASSAY THYROID STIM HORMONE: CPT

## 2024-10-08 PROCEDURE — 94669 MECHANICAL CHEST WALL OSCILL: CPT

## 2024-10-08 PROCEDURE — 80048 BASIC METABOLIC PNL TOTAL CA: CPT

## 2024-10-08 PROCEDURE — 94760 N-INVAS EAR/PLS OXIMETRY 1: CPT

## 2024-10-08 PROCEDURE — 99255 IP/OBS CONSLTJ NEW/EST HI 80: CPT | Performed by: INTERNAL MEDICINE

## 2024-10-08 PROCEDURE — 80048 BASIC METABOLIC PNL TOTAL CA: CPT | Performed by: INTERNAL MEDICINE

## 2024-10-08 PROCEDURE — 99222 1ST HOSP IP/OBS MODERATE 55: CPT | Performed by: STUDENT IN AN ORGANIZED HEALTH CARE EDUCATION/TRAINING PROGRAM

## 2024-10-08 PROCEDURE — 85730 THROMBOPLASTIN TIME PARTIAL: CPT

## 2024-10-08 PROCEDURE — 82330 ASSAY OF CALCIUM: CPT | Performed by: NURSE PRACTITIONER

## 2024-10-08 PROCEDURE — NC001 PR NO CHARGE: Performed by: STUDENT IN AN ORGANIZED HEALTH CARE EDUCATION/TRAINING PROGRAM

## 2024-10-08 PROCEDURE — 80053 COMPREHEN METABOLIC PANEL: CPT

## 2024-10-08 PROCEDURE — 85610 PROTHROMBIN TIME: CPT

## 2024-10-08 PROCEDURE — 99232 SBSQ HOSP IP/OBS MODERATE 35: CPT | Performed by: STUDENT IN AN ORGANIZED HEALTH CARE EDUCATION/TRAINING PROGRAM

## 2024-10-08 PROCEDURE — 94150 VITAL CAPACITY TEST: CPT

## 2024-10-08 PROCEDURE — 85025 COMPLETE CBC W/AUTO DIFF WBC: CPT | Performed by: NURSE PRACTITIONER

## 2024-10-08 PROCEDURE — 94003 VENT MGMT INPAT SUBQ DAY: CPT

## 2024-10-08 PROCEDURE — 94640 AIRWAY INHALATION TREATMENT: CPT

## 2024-10-08 RX ORDER — CALCIUM GLUCONATE 20 MG/ML
1 INJECTION, SOLUTION INTRAVENOUS ONCE
Status: COMPLETED | OUTPATIENT
Start: 2024-10-08 | End: 2024-10-08

## 2024-10-08 RX ORDER — POTASSIUM CHLORIDE 29.8 MG/ML
40 INJECTION INTRAVENOUS ONCE
Status: COMPLETED | OUTPATIENT
Start: 2024-10-08 | End: 2024-10-08

## 2024-10-08 RX ORDER — LEVALBUTEROL INHALATION SOLUTION 1.25 MG/3ML
1.25 SOLUTION RESPIRATORY (INHALATION)
Status: DISCONTINUED | OUTPATIENT
Start: 2024-10-08 | End: 2024-10-31 | Stop reason: HOSPADM

## 2024-10-08 RX ORDER — POTASSIUM CHLORIDE 20MEQ/15ML
20 LIQUID (ML) ORAL ONCE
Status: COMPLETED | OUTPATIENT
Start: 2024-10-08 | End: 2024-10-08

## 2024-10-08 RX ORDER — ATROPINE SULFATE 1 MG/ML
0.5 INJECTION, SOLUTION INTRAVENOUS ONCE
Status: DISCONTINUED | OUTPATIENT
Start: 2024-10-08 | End: 2024-10-08

## 2024-10-08 RX ORDER — POTASSIUM CHLORIDE 14.9 MG/ML
20 INJECTION INTRAVENOUS ONCE
Status: COMPLETED | OUTPATIENT
Start: 2024-10-08 | End: 2024-10-08

## 2024-10-08 RX ORDER — AMOXICILLIN 250 MG
2 CAPSULE ORAL 2 TIMES DAILY
Status: DISCONTINUED | OUTPATIENT
Start: 2024-10-08 | End: 2024-10-10

## 2024-10-08 RX ORDER — ACETYLCYSTEINE 200 MG/ML
3 SOLUTION ORAL; RESPIRATORY (INHALATION)
Status: DISCONTINUED | OUTPATIENT
Start: 2024-10-08 | End: 2024-10-12

## 2024-10-08 RX ORDER — ATROPINE SULFATE 0.1 MG/ML
0.5 INJECTION INTRAVENOUS ONCE
Status: COMPLETED | OUTPATIENT
Start: 2024-10-08 | End: 2024-10-08

## 2024-10-08 RX ADMIN — CHLORHEXIDINE GLUCONATE 15 ML: 1.2 RINSE ORAL at 08:38

## 2024-10-08 RX ADMIN — APIXABAN 5 MG: 5 TABLET, FILM COATED ORAL at 08:38

## 2024-10-08 RX ADMIN — NOREPINEPHRINE BITARTRATE 2 MCG/MIN: 1 INJECTION INTRAVENOUS at 20:20

## 2024-10-08 RX ADMIN — POTASSIUM CHLORIDE 20 MEQ: 1.5 SOLUTION ORAL at 10:32

## 2024-10-08 RX ADMIN — CEFEPIME 2000 MG: 2 INJECTION, POWDER, FOR SOLUTION INTRAVENOUS at 07:40

## 2024-10-08 RX ADMIN — FENTANYL CITRATE 50 MCG: 50 INJECTION INTRAMUSCULAR; INTRAVENOUS at 21:07

## 2024-10-08 RX ADMIN — FENTANYL CITRATE 50 MCG: 50 INJECTION INTRAMUSCULAR; INTRAVENOUS at 19:06

## 2024-10-08 RX ADMIN — POTASSIUM CHLORIDE 20 MEQ: 14.9 INJECTION, SOLUTION INTRAVENOUS at 08:38

## 2024-10-08 RX ADMIN — FENTANYL CITRATE 50 MCG: 50 INJECTION INTRAMUSCULAR; INTRAVENOUS at 03:12

## 2024-10-08 RX ADMIN — ACETYLCYSTEINE 600 MG: 200 SOLUTION ORAL; RESPIRATORY (INHALATION) at 19:23

## 2024-10-08 RX ADMIN — SENNOSIDES AND DOCUSATE SODIUM 2 TABLET: 8.6; 5 TABLET ORAL at 12:20

## 2024-10-08 RX ADMIN — CEFTRIAXONE 2000 MG: 2 INJECTION, POWDER, FOR SOLUTION INTRAMUSCULAR; INTRAVENOUS at 15:38

## 2024-10-08 RX ADMIN — ATROPINE SULFATE 0.5 MG: 0.1 INJECTION INTRAVENOUS at 05:44

## 2024-10-08 RX ADMIN — ACETYLCYSTEINE 600 MG: 200 SOLUTION ORAL; RESPIRATORY (INHALATION) at 13:32

## 2024-10-08 RX ADMIN — CALCIUM GLUCONATE 1 G: 20 INJECTION, SOLUTION INTRAVENOUS at 05:00

## 2024-10-08 RX ADMIN — LEVALBUTEROL HYDROCHLORIDE 1.25 MG: 1.25 SOLUTION RESPIRATORY (INHALATION) at 19:23

## 2024-10-08 RX ADMIN — LEVALBUTEROL HYDROCHLORIDE 1.25 MG: 1.25 SOLUTION RESPIRATORY (INHALATION) at 07:58

## 2024-10-08 RX ADMIN — SODIUM CHLORIDE SOLN NEBU 3% 4 ML: 3 NEBU SOLN at 07:58

## 2024-10-08 RX ADMIN — POTASSIUM CHLORIDE 40 MEQ: 29.8 INJECTION, SOLUTION INTRAVENOUS at 04:57

## 2024-10-08 RX ADMIN — CHLORHEXIDINE GLUCONATE 15 ML: 1.2 RINSE ORAL at 20:20

## 2024-10-08 RX ADMIN — TRIMETHOBENZAMIDE HYDROCHLORIDE 200 MG: 100 INJECTION INTRAMUSCULAR at 21:41

## 2024-10-08 RX ADMIN — SENNOSIDES AND DOCUSATE SODIUM 2 TABLET: 8.6; 5 TABLET ORAL at 18:23

## 2024-10-08 RX ADMIN — APIXABAN 5 MG: 5 TABLET, FILM COATED ORAL at 18:23

## 2024-10-08 RX ADMIN — VANCOMYCIN HYDROCHLORIDE 1750 MG: 5 INJECTION, POWDER, LYOPHILIZED, FOR SOLUTION INTRAVENOUS at 04:23

## 2024-10-08 RX ADMIN — POLYETHYLENE GLYCOL 3350 17 G: 17 POWDER, FOR SOLUTION ORAL at 08:38

## 2024-10-08 RX ADMIN — LEVALBUTEROL HYDROCHLORIDE 1.25 MG: 1.25 SOLUTION RESPIRATORY (INHALATION) at 13:32

## 2024-10-08 NOTE — PROGRESS NOTES
Critical Care Attending Note; John Sutherland   Note Date: 10/08/24  Note Time: 10:02 AM    Patient: Hemanth Swanson  Age, : 39 y.o., 1985 MRN: [id number] Code Status: Level 1 - Full Code Patient Location: ICU 05/ICU 05   Hospital LOS:3 days  ]   Patient seen and examined, medical record reviewed, discussed with house staff and nursing staff.     HPI   CC: Hypernatremia  39M with a PMH Seminoma(Orcetomy/Chemo), HTN, HFpEF, DM, Cardiac Arrest(Hypoxic - ), Demyelinating Neuromuscular Disorder(Trach/Peg),   Who presents after removing peg tube found to have aspiration and hypernatremia.     Key Recent Events   10/5: Admitted  10/6: Bronch  10/8: Atropine Given X1 - Sinus Bradycardia, normotensive  Main ICU Plans:       #Neuro  AMS/ Demyelinating Neuromuscular disorder - Baseline declining over time- Plan to correct metabolic derrangments to look for improvement, slight improvement   - EEG negative  - Transition  Fentanyl PRN     #Card  Bradycardia - Recurrent Vagal? - Previously described due to mucous plugging and hypoxia.Possible neuro reflex vs mucous plugging.  - Cardiology Consult    HFpEF   - Euvolemic     Septic Shock - Component of vaso plegia  - Levophed - MAP > 65mmHg    #Pulm  Acute Respiratory Failure - Secondary to mucous plug/Pneumonia - Trach  - LTVV VC - 6-8cc/kg IDBW  - Wean FiO2 - Maintain Oxygen Sat >92%  - VAP Bundle  - HOB > 30o    - PEEP Titrate  - Trach care  - Xopenex/Chest PT/Mucomyst- CXR 10/9    Pulmonary Embolism   - Eliquis     #Renal  Hypernatremia - Goal 10-12 - Overcorrected, Hypertonic saline not starter by overnight team - Goal 150   - Nephrology Consult  - FWF  - BMP q6hr    #GI  Bowel Regimen - Mirilax    #ID   Pnemonia - Likely Aspiration - Growing proteus, concerns for poly florak  - Vanc/Cefepime- Narrow CTX  - Follow up Infectious     #Endo  DM  -- ISS      #DVT/GI ppx  Apixaban    #Lines/Tubes/Drains:   Invasive Devices       Central Venous Catheter Line   Duration             Port A Cath 03/14/23 Right Subclavian 574 days              Peripheral Intravenous Line  Duration             Peripheral IV 10/06/24 Left;Ventral (anterior) Forearm 1 day    Peripheral IV 10/08/24 Right;Ventral (anterior) Forearm <1 day              Drain  Duration             Gastrostomy/Enterostomy Percutaneous Interventional Gastrostomy 18 Fr.  days              Airway  Duration             Surgical Airway Distal;Cuffed;Other (Comment) 128 days                    #Nutrition:   Diet Enteral/Parenteral; Tube Feeding No Oral Diet; Jevity 1.5; Continuous; 70; Water; Every 4 hours        #Code Status:   Level 1 - Full Code    #Dispo:   ICU        John Sutherland MD  Pulmonary, Critical Care    Critical care time, excluding procedures, teaching, family meetings, and excludes any prior time recorded by the AP/resident, 35 minutes. Upon my evaluation, this patient has a high probability of imminent or life-threatening deterioration due to above problems which required my direct attention, intervention, and personal management.   Impression/Active Problems:    HTN  HFpEF  DM   Neuromuscualr disorder   Tracheostomy   PEG   Hypernatremia   Aspiration   Vaso vagal       Physical Exam:     Vital Signs:   Weight: 99 kg (218 lb 4.1 oz)  IBW: Ideal body weight: 86.8 kg (191 lb 5.7 oz)  Adjusted ideal body weight: 91.7 kg (202 lb 1.9 oz)  Temp:  [97.9 °F (36.6 °C)-99.6 °F (37.6 °C)] 97.9 °F (36.6 °C)  HR:  [42-78] 50  BP: ()/(47-81) 105/53  Resp:  [13-45] 13  SpO2:  [87 %-100 %] 95 %  O2 Device: Ventilator  FiO2 (%):  [40-50] 40  General: NAD  Neuro: More alert, intermittently following commands  Heart: RRR  Lungs: Diminished Bases  Abdomen: Soft ND  Extremities: Min edema                Ventilator Settings:     Vent Mode: AC/VC  FiO2 (%):  [40-50] 40    Results from last 7 days   Lab Units 10/05/24  2316   PO2 SYLVESTER mm Hg 45.3*     Radiologic Images Reviewed:   MRI 6/16  No acute  intracranial pathology.  Stable mild nonspecific white matter changes likely due to chronic microangiopathy.    CXR   White Out Left   Input / Output:     Intake/Output Summary (Last 24 hours) at 10/8/2024 1002  Last data filed at 10/8/2024 0800  Gross per 24 hour   Intake 2818.71 ml   Output 430 ml   Net 2388.71 ml            Infusions:  fentaNYL, 50 mcg/hr, Last Rate: 50 mcg/hr (10/07/24 2351)  norepinephrine, 1-30 mcg/min, Last Rate: 2 mcg/min (10/08/24 0733)      Scheduled Medications:  Current Facility-Administered Medications   Medication Dose Route Frequency Provider Last Rate    acetaminophen  650 mg Oral Q6H PRN Mohsen Moon MD      apixaban  5 mg Per PEG Tube BID Mohsen Moon MD      cefepime  2,000 mg Intravenous Q12H Mohsen Moon MD 2,000 mg (10/08/24 0740)    chlorhexidine  15 mL Mouth/Throat Q12H ISAEL Mohsen Moon MD      fentaNYL  50 mcg/hr Intravenous Continuous Lesvia Wood PA-C 50 mcg/hr (10/07/24 2351)    fentaNYL  50 mcg Intravenous Q2H PRN AMBER Tucker      levalbuterol  1.25 mg Nebulization TID Mohsen Moon MD      norepinephrine  1-30 mcg/min Intravenous Titrated Silvio Alas MD 2 mcg/min (10/08/24 0733)    polyethylene glycol  17 g Per PEG Tube Daily Ne Low MD      potassium chloride  20 mEq Intravenous Once Barbie Jolley MD 20 mEq (10/08/24 0838)    potassium chloride  20 mEq Oral Once Barbie Jolley MD      sodium chloride  4 mL Nebulization TID Mohsen Moon MD      vancomycin  1,750 mg Intravenous Q12H El Sams MD Stopped (10/08/24 0633)       PRN Medications:    acetaminophen    fentaNYL    Labs Reviewed:  Results from last 7 days   Lab Units 10/08/24  0344 10/07/24  0422 10/06/24  0425   WBC Thousand/uL 8.40 10.55* 15.56*   HEMOGLOBIN g/dL 11.2* 12.1 14.3   HEMATOCRIT % 37.6 40.1 48.2   PLATELETS Thousands/uL 146* 167 177      Results from last 7 days   Lab Units 10/08/24  0746 10/08/24  0346  "10/07/24  2347 10/07/24  2107 10/07/24  1749 10/07/24  1435   SODIUM mmol/L 158* 159*  --  160* 159* 159*   CO2 mmol/L 33* 28  --  32 31 30   BUN mg/dL 6 5  --  6 6 6   CALCIUM mg/dL 8.7 7.5*  --  8.7 8.9 8.6   MAGNESIUM mg/dL  --   --  2.4  --  2.4 2.3   PHOSPHORUS mg/dL  --   --  3.1  --  2.4* 1.8*         Invalid input(s): \"ASTSGOT\", \"ALTSGPT\"LABRCNTIP@ ,alkphos:3,tbilirubin:3,dbilirubin:3)@  Results from last 7 days   Lab Units 10/08/24  0344 10/05/24  2020   INR  1.46* 1.63*           Invalid input(s): \"TROPT\", \"PBNP\"             I have personally seen and examined the patient on (10/08/24 between 4768-2230). I discussed the patient with the AP/resident including, but not limited to, verifying findings; reviewing labs and x-rays; discussing with consultants; developing the plan of care with the bedside nurse; and discussing treatment plan with patient or surrogate.  I have reviewed the note and assessment performed by the AP/resident and agree with the AP/resident’s documented findings and plan of care with the above additions/exceptions. Please see my comments for details and adjustments.                 "

## 2024-10-08 NOTE — PROGRESS NOTES
Progress Note - Critical Care/ICU   Name: Hemanth Swanson 39 y.o. male I MRN: [id number]  Unit/Bed#: ICU 05 I Date of Admission: 10/5/2024   Date of Service: 10/8/2024 I Hospital Day: 3      Assessment & Plan  Sepsis (HCC)  [place] ED: febrile, tachycardic, with leukocytosis and a lactate of 12.  X-ray with left sided opacification, increased secretions from tracheostomy, initial pro killian negative. Received weight based fluids.    Transferred to [facility] for further management.  Repeat lab work upon arrival show persistent leukocytosis and hypernatremia.  Drip Score 5.  Urine strep and Legionella negative  [facility] culture revealed 1+ gram negative rods and gram-positive rods, rare gram-positive cocci in pairs.    Plan:  Cefepime and vancomycin  Continue vent support, frequent suctioning  Xopenex and hypertonic saline TID  Chronic respiratory failure with hypoxia and hypercapnia (HCC)  Neuromuscular disease post chemotherapyS/P tracheostomy in 9/2023.  Vent dependent 24/7: current vent settings 14/500/6/60  Last bronchoscopy 6/1 with cultures growing serratia and pseudomonas.  Chest X-ray 10/5: Left sided opacification with possible mucus plugging.  CXR 10/7: Near complete opacification of the left hemithorax with leftward mediastinal shift, mildly improved from 10/5/2024, suggesting baseline underlying atelectasis and pleural effusion.     Possible recurrent aspiration given AMS.  Would also consider possibility of respiratory fatigue given neuromuscular disorder.    Plan:  Continue vent support  Xopnex and hypertonic saline nebs TID  Bronch significant for gram-positive and gram-negative rods.  Cefepime and vancomycin -continue and de-escalate antibiotics as needed  Hypernatremia  Hypernatremia with hyperchloremia, likely in the setting of dehydration.  Received 2 L of NS and 2 L of Isolyte at [place].  Repeat CMP with improvement of Na 175 to 165.  Urine osmolality elevated at 910  Urine sodium within normal  limits at 93  Serum osmolality elevated at 345  This recent sodium 159  Currently holding fluids.    Plan:  Frequent BMP every 4 hours  Toxic metabolic encephalopathy  Likely related to hypoxia 2/2 atelectasis vs mucus plugging.  Continue to monitor for clinical response to Abx  Continue supportive care.  Bradycardia  Likely vagal response to coughing.  Spontaneously resolves without intervention.  Received atropine this AM.    Plan:  Will continue to monitor.  Disposition: Critical care    ICU Core Measures     Vented Patient  VAP Bundle  VAP bundle ordered     A: Assess, Prevent, and Manage Pain Has pain been assessed? Yes  Need for changes to pain regimen? No   B: Both Spontaneous Awakening Trials (SATs) and Spontaneous Breathing Trials (SBTs) Plan to perform spontaneous awakening trial today? Yes   Plan to perform spontaneous breathing trial today? N/A trach  Obvious barriers to extubation? Yes   C: Choice of Sedation RASS Goal: -1 Drowsy or 0 Alert and Calm  Need for changes to sedation or analgesia regimen? No   D: Delirium CAM-ICU: Negative   E: Early Mobility  Plan for early mobility? No   F: Family Engagement Plan for family engagement today? Yes       Antibiotic Review: Patient on appropriate coverage based on culture data.     Review of Invasive Devices:      Central access plan: Hemodynamic monitoring      Prophylaxis:  VTE VTE covered by:  apixaban, Per PEG Tube, 5 mg at 10/07/24 6894       Stress Ulcer  not ordered         24 Hour Events :   Patient seen and examined at bedside this morning.  Was difficult to arouse.  Currently unable to.  Tube feeds started yesterday.  Repeat chest x-ray seems to be worsening with opacification of the left side.  Subjective   Review of Systems: See HPI for Review of Systems    Objective :                   Vitals I/O      Most Recent Min/Max in 24hrs   Temp 98.3 °F (36.8 °C) Temp  Min: 98.3 °F (36.8 °C)  Max: 99.6 °F (37.6 °C)   Pulse 77 Pulse  Min: 42  Max: 77   Resp  14 Resp  Min: 14  Max: 45   BP (!) 88/55 BP  Min: 79/52  Max: 135/81   O2 Sat 99 % SpO2  Min: 82 %  Max: 100 %      Intake/Output Summary (Last 24 hours) at 10/8/2024 0633  Last data filed at 10/8/2024 0546  Gross per 24 hour   Intake 2324.36 ml   Output 430 ml   Net 1894.36 ml       Diet Enteral/Parenteral; Tube Feeding No Oral Diet; Jevity 1.5; Continuous; 70; Water; Every 4 hours    Invasive Monitoring           Physical Exam   Physical Exam  Vitals and nursing note reviewed.   Skin:     General: Skin is warm and dry.   HENT:      Head: Normocephalic and atraumatic.   Neck:   no midline tenderness Cardiovascular:      Rate and Rhythm: Normal rate.   Musculoskeletal:         General: No deformity or signs of injury.      Right lower leg: No edema.      Left lower leg: No edema.   Abdominal: General: Bowel sounds are normal. There is no distension.      Palpations: Abdomen is soft.   Constitutional:       General: He is not in acute distress.     Appearance: He is well-developed and well-nourished. He is ill-appearing.   Pulmonary:      Effort: No respiratory distress.      Comments: Trach/PEG dependent  Neurological:      Mental Status: He is unresponsive.          Diagnostic Studies        Lab Results: I have reviewed the following results:     Medications:  Scheduled PRN   apixaban, 5 mg, BID  cefepime, 2,000 mg, Q12H  chlorhexidine, 15 mL, Q12H ISAEL  levalbuterol, 1.25 mg, TID  polyethylene glycol, 17 g, Daily  potassium chloride, 20 mEq, Once  potassium chloride, 40 mEq, Once  potassium chloride, 20 mEq, Once  sodium chloride, 4 mL, TID  vancomycin, 1,750 mg, Q12H      acetaminophen, 650 mg, Q6H PRN  fentaNYL, 50 mcg, Q2H PRN       Continuous    fentaNYL, 50 mcg/hr, Last Rate: 50 mcg/hr (10/07/24 5992)  norepinephrine, 1-30 mcg/min, Last Rate: Stopped (10/08/24 0561)         Labs:   CBC    Recent Labs     10/07/24  0422 10/08/24  0344   WBC 10.55* 8.40   HGB 12.1 11.2*   HCT 40.1 37.6    146*      BMP    Recent Labs     10/07/24  2107 10/08/24  0344   SODIUM 160* 159*   K 4.0 2.9*   * 124*   CO2 32 28   AGAP 7 7   BUN 6 5   CREATININE 0.81 0.61   CALCIUM 8.7 7.5*       Coags    Recent Labs     10/08/24  0344   INR 1.46*   PTT 31        Additional Electrolytes  Recent Labs     10/06/24  1235 10/07/24  0422 10/07/24  1749 10/07/24  2347 10/08/24  0344   MG 2.3   < > 2.4 2.4  --    PHOS 2.9   < > 2.4* 3.1  --    CAIONIZED 1.09*  --   --   --  1.10*    < > = values in this interval not displayed.          Blood Gas    No recent results  No recent results   LFTs  Recent Labs     10/08/24  0344   ALT 11   AST 12*   ALKPHOS 72   ALB 2.7*   TBILI 0.79       Infectious  No recent results    Glucose  Recent Labs     10/07/24  1435 10/07/24  1749 10/07/24  2107 10/08/24  0344   GLUC 105 96 97 92

## 2024-10-08 NOTE — ASSESSMENT & PLAN NOTE
Neuromuscular disease post chemotherapyS/P tracheostomy in 9/2023.  Vent dependent 24/7: current vent settings 14/500/6/60  Last bronchoscopy 6/1 with cultures growing serratia and pseudomonas.  Chest X-ray 10/5: Left sided opacification with possible mucus plugging.  CXR 10/7: Near complete opacification of the left hemithorax with leftward mediastinal shift, mildly improved from 10/5/2024, suggesting baseline underlying atelectasis and pleural effusion.     Possible recurrent aspiration given AMS.  Would also consider possibility of respiratory fatigue given neuromuscular disorder.    Plan:  Continue vent support  Xopnex and hypertonic saline nebs TID  Bronch significant for gram-positive and gram-negative rods.  Cefepime and vancomycin -continue and de-escalate antibiotics as needed

## 2024-10-08 NOTE — ASSESSMENT & PLAN NOTE
Likely vagal response to coughing.  Spontaneously resolves without intervention.  Received atropine this AM.    Plan:  Will continue to monitor.

## 2024-10-08 NOTE — ASSESSMENT & PLAN NOTE
[place] ED: febrile, tachycardic, with leukocytosis and a lactate of 12.  X-ray with left sided opacification, increased secretions from tracheostomy, initial pro killian negative. Received weight based fluids.    Transferred to [place] [facility] for further management.  Repeat lab work upon arrival show persistent leukocytosis and hypernatremia.  Drip Score 5.  Urine strep and Legionella negative  Bronc culture revealed 1+ gram negative rods and gram-positive rods, rare gram-positive cocci in pairs.    Plan:  Cefepime and vancomycin  Continue vent support, frequent suctioning  Xopenex and hypertonic saline TID

## 2024-10-08 NOTE — CONSULTS
NEPHROLOGY HOSPITAL CONSULTATION   Hemanth Swanson 39 y.o. male MRN: [id number]  Unit/Bed#: ICU 05 Encounter: [id number]    ASSESSMENT and PLAN:  Hemanth Swanson is a 39 y.o. male who was admitted to [facility] after presenting with dislodged PEG tube and left-sided consolidation. A renal consultation is requested today for assistance in the management of hypernatremia.      1.  Hypernatremia.  This is due to lack of free water intake.  Urine osmolality of 910 not suggestive of DI and suggests dehydration.  Serum sodium level on admission 175 at 7:34 PM on 10/05/2024.  Treated with D5W on 10/06 and 10/07 with subsequent discontinuation on 10/07 due to concern for overcorrection.  In adults, a maximum safe rate of correction for chronic hyponatremia has not been established.  But 10 mEq/day is reasonable.  He is currently more than 48 hours out of initial presentation and has not exceeded more than 20 mEq in 48 hours.  Okay to bring serum sodium down slowly today.  Serum sodium level this morning 158.  Free water deficit around 3 L to bring down serum sodium to 150 in next 24 hours accounting for insensible losses.  Start free water flushes through PEG tube 250 cc every 4 hours.  Trend BMP every 6 hours.    2.  Sepsis.  Currently being treated for pneumonia.  Antibiotics per ICU team.    3.  Chronic respiratory failure with hypoxia and hypercapnia.  Ventilator support per ICU.    Discussed with ICU team.  After discussion, we agreed to start lowering sodium level slowly with free water flushes and to aim at goal for sodium level of around 150 by tomorrow morning.    HISTORY OF PRESENT ILLNESS:  Requesting Physician: Silvio Alas,*  Reason for Consult: Hypernatremia    Hemanth Swanson is a 39 y.o. male who was admitted to [facility] after presenting with dislodged PEG tube and left-sided consolidation. A renal consultation is requested today for assistance in the management  "of hypernatremia.  Patient has history of seminoma status postorchiectomy and chemotherapy complicated with progressive neuromuscular disease status post trach and PEG.  He was brought to [place] ER after pulling his PEG out.  He was found to have complete left-sided consolidation.  He was found to be hyponatremic with sodium level of 176.  He requires 24/7 ventilation.    PAST MEDICAL HISTORY:  Past Medical History:   Diagnosis Date    Anxiety     Cancer (HCC)     Depression     Migration of percutaneous endoscopic gastrostomy (PEG) tube (HCC) 2023    Psychiatric disorder     bipolar       PAST SURGICAL HISTORY:  Past Surgical History:   Procedure Laterality Date    IR BIOPSY LYMPH NODE  2023    IR GASTROSTOMY (G) TUBE CHECK/CHANGE/REINSERTION/UPSIZE  2024    IR GASTROSTOMY TUBE PLACEMENT  2023    IR LUMBAR PUNCTURE  2023    IR PORT PLACEMENT  2023    ORCHIECTOMY Left 2022    Procedure: ORCHIECTOMY INGUINAL;  Surgeon: Flip Michael MD;  Location:  MAIN OR;  Service: Urology    PEG W/TRACHEOSTOMY PLACEMENT N/A 2023    Procedure: TRACHEOSTOMY WITH INSERTION PEG TUBE;  Surgeon: Rudy Mcmanus MD;  Location: [facility] OR;  Service: General    TESTICLE SURGERY         ALLERGIES:  Allergies   Allergen Reactions    Dog Epithelium Cough, Sneezing and Nasal Congestion       SOCIAL HISTORY:  Social History     Substance and Sexual Activity   Alcohol Use Not Currently    Comment: Former alcoholic. Last use in 2016     Social History     Substance and Sexual Activity   Drug Use Not Currently    Types: \"Crack\" cocaine, Marijuana    Comment: Current use medical marijuana. Not currently using crack cocaine.     Social History     Tobacco Use   Smoking Status Former    Current packs/day: 0.00    Average packs/day: 0.7 packs/day for 22.7 years (15.0 ttl pk-yrs)    Types: Cigarettes    Start date: 2008    Quit date: 2023    Years since quittin.3   Smokeless Tobacco " Never       FAMILY HISTORY:  Family History   Problem Relation Age of Onset    Coronary artery disease Maternal Aunt     Cancer Father     Diabetes Father     Hypertension Father        MEDICATIONS:    Current Facility-Administered Medications:     acetaminophen (TYLENOL) oral suspension 650 mg, 650 mg, Oral, Q6H PRN, Mohsen Moon MD    acetylcysteine (MUCOMYST) 200 mg/mL inhalation solution 600 mg, 3 mL, Nebulization, Q6H, Ne Low MD    apixaban (ELIQUIS) tablet 5 mg, 5 mg, Per PEG Tube, BID, Mohsen Moon MD, 5 mg at 10/08/24 0838    cefTRIAXone (ROCEPHIN) 2,000 mg in dextrose 5 % 50 mL IVPB, 2,000 mg, Intravenous, Q24H, Ne Low MD    chlorhexidine (PERIDEX) 0.12 % oral rinse 15 mL, 15 mL, Mouth/Throat, Q12H ISAEL, Mohsen Moon MD, 15 mL at 10/08/24 0838    fentaNYL injection 50 mcg, 50 mcg, Intravenous, Q2H PRN, AMBER Tucker, 50 mcg at 10/08/24 0312    levalbuterol (XOPENEX) inhalation solution 1.25 mg, 1.25 mg, Nebulization, TID, Mohsen Moon MD, 1.25 mg at 10/08/24 0758    NOREPINEPHRINE 4 MG  ML NSS (CMPD ORDER) infusion, 1-30 mcg/min, Intravenous, Titrated, Silvio Alas MD, Last Rate: 3.8 mL/hr at 10/08/24 1028, 1 mcg/min at 10/08/24 1028    polyethylene glycol (MIRALAX) packet 17 g, 17 g, Per PEG Tube, Daily, Ne Low MD, 17 g at 10/08/24 0838    senna-docusate sodium (SENOKOT S) 8.6-50 mg per tablet 2 tablet, 2 tablet, Oral, BID, Ne Low MD    REVIEW OF SYSTEMS:  Review of Systems   Unable to perform ROS: Intubated     PHYSICAL EXAM:  Current Weight: Weight - Scale: 99 kg (218 lb 4.1 oz)  First Weight: Weight - Scale: 99.1 kg (218 lb 7.6 oz)  Vitals:    10/08/24 0900 10/08/24 1000 10/08/24 1028 10/08/24 1040   BP: 102/63 95/59 108/68    Pulse: 61 (!) 49 (!) 49    Resp: 14 14 14    Temp:       TempSrc:       SpO2: 98% 97% 99% 97%   Weight:       Height:           Intake/Output Summary (Last 24 hours)  at 10/8/2024 1052  Last data filed at 10/8/2024 1027  Gross per 24 hour   Intake 2957.96 ml   Output 430 ml   Net 2527.96 ml     Physical Exam  Constitutional:       Appearance: He is ill-appearing.   HENT:      Head: Normocephalic and atraumatic.   Neck:      Comments: Tracheostomy present  Cardiovascular:      Rate and Rhythm: Normal rate and regular rhythm.   Pulmonary:      Effort: Pulmonary effort is normal.   Abdominal:      Comments: PEG tube present   Musculoskeletal:         General: Normal range of motion.      Right lower leg: No edema.      Left lower leg: No edema.   Skin:     General: Skin is warm.   Neurological:      Mental Status: He is disoriented.       Lab Results:   Results from last 7 days   Lab Units 10/08/24  0746 10/08/24  0344 10/07/24  2347 10/07/24  2107 10/07/24  1749 10/07/24  1435 10/07/24  0958 10/07/24  0422 10/06/24  0810 10/06/24  0425 10/06/24  0148 10/05/24  2312 10/05/24  2311 10/05/24  1934   WBC Thousand/uL  --  8.40  --   --   --   --   --  10.55*  --  15.56*  --   --    < > 14.76*   HEMOGLOBIN g/dL  --  11.2*  --   --   --   --   --  12.1  --  14.3  --   --    < > 18.2*   HEMATOCRIT %  --  37.6  --   --   --   --   --  40.1  --  48.2  --   --    < > 63.2*   PLATELETS Thousands/uL  --  146*  --   --   --   --   --  167  --  177  --   --    < > 228   POTASSIUM mmol/L 4.1 2.9*  --  4.0 3.9 4.0   < > 3.4*   < > 3.7   < > 3.4*  --  3.3*   CHLORIDE mmol/L 122* 124*  --  121* 121* 123*   < > 120*   < > 126*   < > 125*  --  116*   CO2 mmol/L 33* 28  --  32 31 30   < > 30   < > 35*   < > 33*  --  38*   BUN mg/dL 6 5  --  6 6 6   < > 6   < > 9   < > 11  --  13   CREATININE mg/dL 0.69 0.61  --  0.81 0.77 0.74   < > 0.58*   < > 0.82   < > 0.84  --  1.10   CALCIUM mg/dL 8.7 7.5*  --  8.7 8.9 8.6   < > 8.3*   < > 8.2*   < > 8.0*  --  10.2   MAGNESIUM mg/dL  --   --  2.4  --  2.4 2.3  --  2.2   < > 2.5  --  2.6   < >  --    PHOSPHORUS mg/dL  --   --  3.1  --  2.4* 1.8*  --  1.7*   < > 2.3*  " --  3.2   < >  --    ALK PHOS U/L  --  72  --   --   --   --   --   --   --   --   --  78  --  116*   ALT U/L  --  11  --   --   --   --   --   --   --   --   --  19  --  27   AST U/L  --  12*  --   --   --   --   --   --   --   --   --  14  --  20    < > = values in this interval not displayed.     Portions of the record may have been created with voice recognition software. Occasional wrong word or \"sound a like\" substitutions may have occurred due to the inherent limitations of voice recognition software. Read the chart carefully and recognize, using context, where substitutions have occurred. If you have any questions, please contact the dictating provider.    "

## 2024-10-08 NOTE — PLAN OF CARE
Problem: Prexisting or High Potential for Compromised Skin Integrity  Goal: Skin integrity is maintained or improved  Description: INTERVENTIONS:  - Identify patients at risk for skin breakdown  - Assess and monitor skin integrity  - Assess and monitor nutrition and hydration status  - Monitor labs   - Assess for incontinence   - Turn and reposition patient  - Assist with mobility/ambulation  - Relieve pressure over bony prominences  - Avoid friction and shearing  - Provide appropriate hygiene as needed including keeping skin clean and dry  - Evaluate need for skin moisturizer/barrier cream  - Collaborate with interdisciplinary team   - Patient/family teaching  - Consider wound care consult   Outcome: Progressing     Problem: PAIN - ADULT  Goal: Verbalizes/displays adequate comfort level or baseline comfort level  Description: Interventions:  - Encourage patient to monitor pain and request assistance  - Assess pain using appropriate pain scale  - Administer analgesics based on type and severity of pain and evaluate response  - Implement non-pharmacological measures as appropriate and evaluate response  - Consider cultural and social influences on pain and pain management  - Notify physician/advanced practitioner if interventions unsuccessful or patient reports new pain  Outcome: Progressing     Problem: INFECTION - ADULT  Goal: Absence or prevention of progression during hospitalization  Description: INTERVENTIONS:  - Assess and monitor for signs and symptoms of infection  - Monitor lab/diagnostic results  - Monitor all insertion sites, i.e. indwelling lines, tubes, and drains  - Monitor endotracheal if appropriate and nasal secretions for changes in amount and color  - [place] appropriate cooling/warming therapies per order  - Administer medications as ordered  - Instruct and encourage patient and family to use good hand hygiene technique  - Identify and instruct in appropriate isolation precautions for  identified infection/condition  Outcome: Progressing  Goal: Absence of fever/infection during neutropenic period  Description: INTERVENTIONS:  - Monitor WBC    Outcome: Progressing

## 2024-10-08 NOTE — CONSULTS
Vancomycin IV Pharmacy-to-Dose Consultation    Hemanth Swanson is a 39 y.o. male who was receiving Vancomycin IV with management by the Pharmacy Consult service for treatment of Pneumonia (goal -600, trough >10)  .       The patient’s Vancomycin therapy has been discontinued. Thank you for allowing us to take part in this patient's care. Pharmacy will sign-off now; please call or re-consult if there are any questions.      Marybel Tang, PharmD  Critical Care & Internal Medicine Clinical Pharmacist   Available via Epic Secure Chat

## 2024-10-08 NOTE — CONSULTS
Cardiology   Hemanth Swanson 39 y.o. male MRN: [id number]  Unit/Bed#: ICU 05 Encounter: [id number]      Reason for Consult / Principal Problem: Bradycardia    Physician Requesting Consult:  Silvio Alas,*    Outpatient Cardiologist: Dr. Tatum    Assessment  1.  Bradycardia  -Unclear symptomatology due to mental status.  -Baseline heart rates per ECG review appear to be in the 60s.  -Telemetry review -predominant sinus bradycardia/NSR with heart rates in the 50s to 60s.  Heart rates down into the upper 30s to 40s overnight while sleeping.  On 10/7 AM he did have a 10-second sinus arrest with no escape complexes --Per RN staff no clear precipitating events prior to this (not visibly agitated, no coughing), RN was unclear if there was hypoxia during this event.   2. Sepsis  -Management per the CC service.  -Chest x-ray-Complete opacification of the left hemithorax with mediastinal shift to the left indicating at least in part atelectasis.   -Procalcitonin 0.08  -Afebrile currently, leukocytosis improving since admission.  -On IV antibiotics.  3. Acute on chronic respiratory failure, ventilator dependent.  -Chronic respiratory failure - secondary to neuromuscular disease.  Acute respiratory failure secondary to mucous plugging/PNA).   -Management per the CC service.  4. Chronic HFpEF  -Appears euvolemic on exam.  -BNP level 11.  -TTE 6/11/2024; LVEF 65%, wall motion normal, RV dilated/RV systolic function is normal, LA/RA normal in size, no significant valvular disease.  -Outpatient diuretic regimen; none.  -Inpatient diuretic regimen; none.  5. Testicular cancer  -S/p right orchiectomy 12/2022  -Received cisplatin/etoposide - currently on HOLD  6. Obesity; BMI 27.    Plan  -Suspect the primary driving factor for his transient episodes of bradycardia / pauses / arrest is vasovagal mediated in the context of his underlying lung processes (PNA and mucous plugging) he also appears to get quite  anxious/agitated when stimulated and begins to bear down, turns red and gets diaphoretic --this occurred during/shortly thereafter my evaluation with my attending physician-- there was no hypoxia but heart rates did drop down into the upper 30s low 40s. At this time there is no acute indication for a PPM. Continue to optimize underlying lung processes (PNA/mucus plugging) and correction of electrolytes disturbances. Appears to be an anxiety/agitation component to this as well; would limit sedation and try anti anxiolytics if needed. Try to avoid any medical therapies that can contribute to bradycardia. Can utilize atropine if brooke and becomes hemodynamically unstable or utilize IV dopamine for situations of sustained / marked bradycardia.   -Sepsis/PNA management per the CC service.  -Needs aggressive pulmonary hygiene.  -Monitor electrolytes closely.  Replete to maintain K+ level of 4.0/magnesium level at 2.0.  -Continue to monitor closely on telemetry.      HPI: Hemanth Swanson 39 y.o. year old male with a medical history of chronic HFpEF, hypertension, hypertriglyceridemia, obesity, and testicular CA.  History of nicotine dependence, heavy alcohol use (quit drinking a few years ago), methamphetamine use (quit a few years ago).  He follows with Dr. Tatum with the  [facility] service last seen as an outpatient back in July 2023.  He unfortunately has developed progressive demyelinating neuromuscular disease which has led to tracheostomy/PEG tube placement.  He presented to the  ED on 10/6/2024  after the patient reportedly pulled out his PEG tube.  He was also noted to have been febrile with a temp of 101, lactic acid 12.1, and leukocytosis.  He was also found to be severely hyponatremic with a Na+ level of 176.  Chest x-ray imaging demonstrated complete left-sided consolidation.  There was concern for probable PNA as well as mucous plugging.  He was initiated on IV fluids for hydration and IV antibiotics.  Over  the weekend he underwent bronchoscopy.  Repeat chest x-ray imaging from yesterday demonstrated near complete opacification of the left hemithorax with leftward mediastinal shift to mildly improved from 10/5.  Per report by the CCU staff the patient has been experiencing transient episodes of bradycardia which seem to correlate during episodes of stimulation in which the patient visibly appears to be bearing down or coughing with increased peak pressures on the ventilator.  Per report during these events the patient becomes acutely hypoxic and starts to brooke down.  These episodes appear to resolve after the patient undergoes deep tracheal suctioning with extraction of secretions. Cardiology has been consulted for further treatment recommendations/management.      Family History:   Family History   Problem Relation Age of Onset    Coronary artery disease Maternal Aunt     Cancer Father     Diabetes Father     Hypertension Father      Historical Information   Past Medical History:   Diagnosis Date    Anxiety     Cancer (HCC)     Depression     Migration of percutaneous endoscopic gastrostomy (PEG) tube (HCC) 09/24/2023    Psychiatric disorder     bipolar     Past Surgical History:   Procedure Laterality Date    IR BIOPSY LYMPH NODE  01/20/2023    IR GASTROSTOMY (G) TUBE CHECK/CHANGE/REINSERTION/UPSIZE  6/18/2024    IR GASTROSTOMY TUBE PLACEMENT  09/25/2023    IR LUMBAR PUNCTURE  09/06/2023    IR PORT PLACEMENT  03/14/2023    ORCHIECTOMY Left 12/14/2022    Procedure: ORCHIECTOMY INGUINAL;  Surgeon: Flip Michael MD;  Location:  MAIN OR;  Service: Urology    PEG W/TRACHEOSTOMY PLACEMENT N/A 09/08/2023    Procedure: TRACHEOSTOMY WITH INSERTION PEG TUBE;  Surgeon: Rudy Mcmanus MD;  Location: [facility];  Service: General    TESTICLE SURGERY       Social History   Social History     Substance and Sexual Activity   Alcohol Use Not Currently    Comment: Former alcoholic. Last use in 2016     Social History  "    Substance and Sexual Activity   Drug Use Not Currently    Types: \"Crack\" cocaine, Marijuana    Comment: Current use medical marijuana. Not currently using crack cocaine.     Social History     Tobacco Use   Smoking Status Former    Current packs/day: 0.00    Average packs/day: 0.7 packs/day for 22.7 years (15.0 ttl pk-yrs)    Types: Cigarettes    Start date: 2008    Quit date: 2023    Years since quittin.3   Smokeless Tobacco Never     Family History:   Family History   Problem Relation Age of Onset    Coronary artery disease Maternal Aunt     Cancer Father     Diabetes Father     Hypertension Father        Review of Systems:  Review of Systems   Unable to perform ROS: Patient nonverbal           Scheduled Meds:  Current Facility-Administered Medications   Medication Dose Route Frequency Provider Last Rate    acetaminophen  650 mg Oral Q6H PRN Mohsen Moon MD      acetylcysteine  3 mL Nebulization Q6H Ne Low MD      apixaban  5 mg Per PEG Tube BID Mohsen Moon MD      cefTRIAXone  2,000 mg Intravenous Q24H Ne Low MD      chlorhexidine  15 mL Mouth/Throat Q12H [facility] Mohsen Moon MD      fentaNYL  50 mcg Intravenous Q2H PRN AMBER Tucker      levalbuterol  1.25 mg Nebulization TID Mohsen Moon MD      norepinephrine  1-30 mcg/min Intravenous Titrated Silvio Alas MD 1 mcg/min (10/08/24 1028)    polyethylene glycol  17 g Per PEG Tube Daily Ne Low MD      senna-docusate sodium  2 tablet Oral BID Ne Low MD       Continuous Infusions:norepinephrine, 1-30 mcg/min, Last Rate: 1 mcg/min (10/08/24 1028)      PRN Meds:.  acetaminophen    fentaNYL  all current active meds have been reviewed and current meds:   Current Facility-Administered Medications:     acetaminophen (TYLENOL) oral suspension 650 mg, Q6H PRN    acetylcysteine (MUCOMYST) 200 mg/mL inhalation solution 600 mg, Q6H    apixaban (ELIQUIS) " "tablet 5 mg, BID    cefTRIAXone (ROCEPHIN) 2,000 mg in dextrose 5 % 50 mL IVPB, Q24H    chlorhexidine (PERIDEX) 0.12 % oral rinse 15 mL, Q12H ISAEL    fentaNYL injection 50 mcg, Q2H PRN    levalbuterol (XOPENEX) inhalation solution 1.25 mg, Q6H    NOREPINEPHRINE 4 MG  ML NSS (CMPD ORDER) infusion, Titrated, Last Rate: 1 mcg/min (10/08/24 1028)    polyethylene glycol (MIRALAX) packet 17 g, Daily    senna-docusate sodium (SENOKOT S) 8.6-50 mg per tablet 2 tablet, BID    Allergies   Allergen Reactions    Dog Epithelium Cough, Sneezing and Nasal Congestion       Objective   Vitals: Blood pressure 111/54, pulse (!) 53, temperature 98.5 °F (36.9 °C), temperature source Oral, resp. rate 14, height 6' 4\" (1.93 m), weight 99 kg (218 lb 4.1 oz), SpO2 100%., Body mass index is 26.57 kg/m².,   Orthostatic Blood Pressures      Flowsheet Row Most Recent Value   Blood Pressure 111/54 filed at 10/08/2024 1100   Patient Position - Orthostatic VS Lying filed at 10/08/2024 1100              Intake/Output Summary (Last 24 hours) at 10/8/2024 1256  Last data filed at 10/8/2024 1200  Gross per 24 hour   Intake 2881.19 ml   Output 430 ml   Net 2451.19 ml       Invasive Devices       Central Venous Catheter Line  Duration             Port A Cath 03/14/23 Right Subclavian 574 days              Peripheral Intravenous Line  Duration             Peripheral IV 10/06/24 Left;Ventral (anterior) Forearm 1 day    Peripheral IV 10/08/24 Right;Ventral (anterior) Forearm <1 day              Drain  Duration             Gastrostomy/Enterostomy Percutaneous Interventional Gastrostomy 18 Fr.  days              Airway  Duration             Surgical Airway Distal;Cuffed;Other (Comment) 129 days                    Physical Exam:  Physical Exam  Vitals reviewed.   Constitutional:       Appearance: He is ill-appearing.   Eyes:      General: No scleral icterus.  Cardiovascular:      Rate and Rhythm: Regular rhythm. Bradycardia present.      Pulses: " Normal pulses.   Pulmonary:      Effort: Pulmonary effort is normal.   Abdominal:      Palpations: Abdomen is soft.   Musculoskeletal:      Right lower leg: No edema.      Left lower leg: No edema.   Skin:     General: Skin is warm.   Neurological:      Mental Status: He is alert.      Comments: Opens eyes spontaneously  Follows commands.         Lab Results:   Recent Results (from the past 24 hour(s))   Basic metabolic panel    Collection Time: 10/07/24  2:35 PM   Result Value Ref Range    Sodium 159 (H) 135 - 147 mmol/L    Potassium 4.0 3.5 - 5.3 mmol/L    Chloride 123 (H) 96 - 108 mmol/L    CO2 30 21 - 32 mmol/L    ANION GAP 6 4 - 13 mmol/L    BUN 6 5 - 25 mg/dL    Creatinine 0.74 0.60 - 1.30 mg/dL    Glucose 105 65 - 140 mg/dL    Calcium 8.6 8.4 - 10.2 mg/dL    eGFR 117 ml/min/1.73sq m   Phosphorus    Collection Time: 10/07/24  2:35 PM   Result Value Ref Range    Phosphorus 1.8 (L) 2.7 - 4.5 mg/dL   Magnesium    Collection Time: 10/07/24  2:35 PM   Result Value Ref Range    Magnesium 2.3 1.9 - 2.7 mg/dL   Basic metabolic panel    Collection Time: 10/07/24  5:49 PM   Result Value Ref Range    Sodium 159 (H) 135 - 147 mmol/L    Potassium 3.9 3.5 - 5.3 mmol/L    Chloride 121 (H) 96 - 108 mmol/L    CO2 31 21 - 32 mmol/L    ANION GAP 7 4 - 13 mmol/L    BUN 6 5 - 25 mg/dL    Creatinine 0.77 0.60 - 1.30 mg/dL    Glucose 96 65 - 140 mg/dL    Calcium 8.9 8.4 - 10.2 mg/dL    eGFR 115 ml/min/1.73sq m   Phosphorus    Collection Time: 10/07/24  5:49 PM   Result Value Ref Range    Phosphorus 2.4 (L) 2.7 - 4.5 mg/dL   Magnesium    Collection Time: 10/07/24  5:49 PM   Result Value Ref Range    Magnesium 2.4 1.9 - 2.7 mg/dL   Basic metabolic panel    Collection Time: 10/07/24  9:07 PM   Result Value Ref Range    Sodium 160 (H) 135 - 147 mmol/L    Potassium 4.0 3.5 - 5.3 mmol/L    Chloride 121 (H) 96 - 108 mmol/L    CO2 32 21 - 32 mmol/L    ANION GAP 7 4 - 13 mmol/L    BUN 6 5 - 25 mg/dL    Creatinine 0.81 0.60 - 1.30 mg/dL     Glucose 97 65 - 140 mg/dL    Calcium 8.7 8.4 - 10.2 mg/dL    eGFR 113 ml/min/1.73sq m   Phosphorus    Collection Time: 10/07/24 11:47 PM   Result Value Ref Range    Phosphorus 3.1 2.7 - 4.5 mg/dL   Magnesium    Collection Time: 10/07/24 11:47 PM   Result Value Ref Range    Magnesium 2.4 1.9 - 2.7 mg/dL   APTT    Collection Time: 10/08/24  3:44 AM   Result Value Ref Range    PTT 31 23 - 34 seconds   Protime-INR    Collection Time: 10/08/24  3:44 AM   Result Value Ref Range    Protime 18.4 (H) 12.3 - 15.0 seconds    INR 1.46 (H) 0.85 - 1.19   Comprehensive metabolic panel    Collection Time: 10/08/24  3:44 AM   Result Value Ref Range    Sodium 159 (H) 135 - 147 mmol/L    Potassium 2.9 (L) 3.5 - 5.3 mmol/L    Chloride 124 (H) 96 - 108 mmol/L    CO2 28 21 - 32 mmol/L    ANION GAP 7 4 - 13 mmol/L    BUN 5 5 - 25 mg/dL    Creatinine 0.61 0.60 - 1.30 mg/dL    Glucose 92 65 - 140 mg/dL    Calcium 7.5 (L) 8.4 - 10.2 mg/dL    Corrected Calcium 8.5 8.3 - 10.1 mg/dL    AST 12 (L) 13 - 39 U/L    ALT 11 7 - 52 U/L    Alkaline Phosphatase 72 34 - 104 U/L    Total Protein 5.2 (L) 6.4 - 8.4 g/dL    Albumin 2.7 (L) 3.5 - 5.0 g/dL    Total Bilirubin 0.79 0.20 - 1.00 mg/dL    eGFR 127 ml/min/1.73sq m   CBC and differential    Collection Time: 10/08/24  3:44 AM   Result Value Ref Range    WBC 8.40 4.31 - 10.16 Thousand/uL    RBC 3.64 (L) 3.88 - 5.62 Million/uL    Hemoglobin 11.2 (L) 12.0 - 17.0 g/dL    Hematocrit 37.6 36.5 - 49.3 %     (H) 82 - 98 fL    MCH 30.8 26.8 - 34.3 pg    MCHC 29.8 (L) 31.4 - 37.4 g/dL    RDW 18.3 (H) 11.6 - 15.1 %    MPV 10.9 8.9 - 12.7 fL    Platelets 146 (L) 149 - 390 Thousands/uL    nRBC 0 /100 WBCs    Segmented % 74 43 - 75 %    Immature Grans % 1 0 - 2 %    Lymphocytes % 14 14 - 44 %    Monocytes % 7 4 - 12 %    Eosinophils Relative 3 0 - 6 %    Basophils Relative 1 0 - 1 %    Absolute Neutrophils 6.36 1.85 - 7.62 Thousands/µL    Absolute Immature Grans 0.06 0.00 - 0.20 Thousand/uL    Absolute  Lymphocytes 1.14 0.60 - 4.47 Thousands/µL    Absolute Monocytes 0.55 0.17 - 1.22 Thousand/µL    Eosinophils Absolute 0.25 0.00 - 0.61 Thousand/µL    Basophils Absolute 0.04 0.00 - 0.10 Thousands/µL   Calcium, ionized    Collection Time: 10/08/24  3:44 AM   Result Value Ref Range    Calcium, Ionized 1.10 (L) 1.12 - 1.32 mmol/L   Basic metabolic panel    Collection Time: 10/08/24  7:46 AM   Result Value Ref Range    Sodium 158 (H) 135 - 147 mmol/L    Potassium 4.1 3.5 - 5.3 mmol/L    Chloride 122 (H) 96 - 108 mmol/L    CO2 33 (H) 21 - 32 mmol/L    ANION GAP 3 (L) 4 - 13 mmol/L    BUN 6 5 - 25 mg/dL    Creatinine 0.69 0.60 - 1.30 mg/dL    Glucose 90 65 - 140 mg/dL    Calcium 8.7 8.4 - 10.2 mg/dL    eGFR 121 ml/min/1.73sq m   TSH, 3rd generation with Free T4 reflex    Collection Time: 10/08/24  7:46 AM   Result Value Ref Range    TSH 3RD GENERATON 3.179 0.450 - 4.500 uIU/mL         * Please Note: Fluency DirectDictation voice to text software may have been used in the creation of this document. **

## 2024-10-08 NOTE — PLAN OF CARE
Problem: Prexisting or High Potential for Compromised Skin Integrity  Goal: Skin integrity is maintained or improved  Description: INTERVENTIONS:  - Identify patients at risk for skin breakdown  - Assess and monitor skin integrity  - Assess and monitor nutrition and hydration status  - Monitor labs   - Assess for incontinence   - Turn and reposition patient  - Assist with mobility/ambulation  - Relieve pressure over bony prominences  - Avoid friction and shearing  - Provide appropriate hygiene as needed including keeping skin clean and dry  - Evaluate need for skin moisturizer/barrier cream  - Collaborate with interdisciplinary team   - Patient/family teaching  - Consider wound care consult   10/8/2024 0923 by Bethany Farris RN  Outcome: Progressing  10/8/2024 0922 by Bethany Farris RN  Outcome: Progressing  10/8/2024 0922 by Bethany Farris RN  Outcome: Progressing     Problem: PAIN - ADULT  Goal: Verbalizes/displays adequate comfort level or baseline comfort level  Description: Interventions:  - Encourage patient to monitor pain and request assistance  - Assess pain using appropriate pain scale  - Administer analgesics based on type and severity of pain and evaluate response  - Implement non-pharmacological measures as appropriate and evaluate response  - Consider cultural and social influences on pain and pain management  - Notify physician/advanced practitioner if interventions unsuccessful or patient reports new pain  10/8/2024 0923 by Bethany Farris RN  Outcome: Progressing  10/8/2024 0922 by Bethany Farris RN  Outcome: Progressing  10/8/2024 0922 by Bethany Farris RN  Outcome: Progressing     Problem: INFECTION - ADULT  Goal: Absence or prevention of progression during hospitalization  Description: INTERVENTIONS:  - Assess and monitor for signs and symptoms of infection  - Monitor lab/diagnostic results  - Monitor all insertion sites, i.e. indwelling lines, tubes, and  drains  - Monitor endotracheal if appropriate and nasal secretions for changes in amount and color  - [place] appropriate cooling/warming therapies per order  - Administer medications as ordered  - Instruct and encourage patient and family to use good hand hygiene technique  - Identify and instruct in appropriate isolation precautions for identified infection/condition  10/8/2024 0923 by Bethany Farris RN  Outcome: Progressing  10/8/2024 0922 by Bethany Farris RN  Outcome: Progressing  10/8/2024 0922 by Bethany Farris RN  Outcome: Progressing  Goal: Absence of fever/infection during neutropenic period  Description: INTERVENTIONS:  - Monitor WBC    10/8/2024 0923 by Bethany Farris RN  Outcome: Progressing  10/8/2024 0922 by Bethany Farris RN  Outcome: Progressing  10/8/2024 0922 by Bethany Farris RN  Outcome: Progressing     Problem: SAFETY ADULT  Goal: Patient will remain free of falls  Description: INTERVENTIONS:  - Educate patient/family on patient safety including physical limitations  - Instruct patient to call for assistance with activity   - Consult OT/PT to assist with strengthening/mobility   - Keep Call bell within reach  - Keep bed low and locked with side rails adjusted as appropriate  - Keep care items and personal belongings within reach  - Initiate and maintain comfort rounds  - Make Fall Risk Sign visible to staff  - Offer Toileting every 2 Hours, in advance of need  - Initiate/Maintain alarm  - Obtain necessary fall risk management equipment:   - Apply yellow socks and bracelet for high fall risk patients  - Consider moving patient to room near nurses station  10/8/2024 0923 by Bethany Farris RN  Outcome: Progressing  10/8/2024 0922 by Bethany Farris RN  Outcome: Progressing  10/8/2024 0922 by Bethany Farris RN  Outcome: Progressing  Goal: Maintain or return to baseline ADL function  Description: INTERVENTIONS:  -  Assess patient's ability to  carry out ADLs; assess patient's baseline for ADL function and identify physical deficits which impact ability to perform ADLs (bathing, care of mouth/teeth, toileting, grooming, dressing, etc.)  - Assess/evaluate cause of self-care deficits   - Assess range of motion  - Assess patient's mobility; develop plan if impaired  - Assess patient's need for assistive devices and provide as appropriate  - Encourage maximum independence but intervene and supervise when necessary  - Involve family in performance of ADLs  - Assess for home care needs following discharge   - Consider OT consult to assist with ADL evaluation and planning for discharge  - Provide patient education as appropriate  10/8/2024 0923 by Bethany Farris RN  Outcome: Progressing  10/8/2024 0922 by Bethany Farris RN  Outcome: Progressing  10/8/2024 0922 by Bethany Farris RN  Outcome: Progressing  Goal: Maintains/Returns to pre admission functional level  Description: INTERVENTIONS:  - Perform AM-PAC 6 Click Basic Mobility/ Daily Activity assessment daily.  - Set and communicate daily mobility goal to care team and patient/family/caregiver.   - Collaborate with rehabilitation services on mobility goals if consulted  - Perform Range of Motion 3 times a day.  - Reposition patient every 2 hours.  - Dangle patient 3 times a day  - Stand patient 3 times a day  - Ambulate patient 3 times a day  - Out of bed to chair 3 times a day   - Out of bed for meals 3 times a day  - Out of bed for toileting  - Record patient progress and toleration of activity level   10/8/2024 0923 by Bethany Farris RN  Outcome: Progressing  10/8/2024 0922 by Bethany Farris RN  Outcome: Progressing  10/8/2024 0922 by Bethany Farris RN  Outcome: Progressing     Problem: DISCHARGE PLANNING  Goal: Discharge to home or other facility with appropriate resources  Description: INTERVENTIONS:  - Identify barriers to discharge w/patient and caregiver  - Arrange  for needed discharge resources and transportation as appropriate  - Identify discharge learning needs (meds, wound care, etc.)  - Arrange for interpretive services to assist at discharge as needed  - Refer to Case Management Department for coordinating discharge planning if the patient needs post-hospital services based on physician/advanced practitioner order or complex needs related to functional status, cognitive ability, or social support system  10/8/2024 0923 by Bethany Farris RN  Outcome: Progressing  10/8/2024 0922 by Bethany Farris RN  Outcome: Progressing  10/8/2024 0922 by Bethany Farris RN  Outcome: Progressing     Problem: Knowledge Deficit  Goal: Patient/family/caregiver demonstrates understanding of disease process, treatment plan, medications, and discharge instructions  Description: Complete learning assessment and assess knowledge base.  Interventions:  - Provide teaching at level of understanding  - Provide teaching via preferred learning methods  10/8/2024 0923 by Bethany Farris RN  Outcome: Progressing  10/8/2024 0922 by Bethany Farris RN  Outcome: Progressing  10/8/2024 0922 by Bethany Farris RN  Outcome: Progressing     Problem: SAFETY,RESTRAINT: NV/NON-SELF DESTRUCTIVE BEHAVIOR  Goal: Remains free of harm/injury (restraint for non violent/non self-detsructive behavior)  Description: INTERVENTIONS:  - Instruct patient/family regarding restraint use   - Assess and monitor physiologic and psychological status   - Provide interventions and comfort measures to meet assessed patient needs   - Identify and implement measures to help patient regain control  - Assess readiness for release of restraint   10/8/2024 0923 by Bethany Farris RN  Outcome: Progressing  10/8/2024 0922 by Bethany Farris RN  Outcome: Progressing  10/8/2024 0922 by Bethany Farris RN  Outcome: Progressing  Goal: Returns to optimal restraint-free functioning  Description:  INTERVENTIONS:  - Assess the patient's behavior and symptoms that indicate continued need for restraint  - Identify and implement measures to help patient regain control  - Assess readiness for release of restraint   10/8/2024 0923 by Bethany Farris RN  Outcome: Progressing  10/8/2024 0922 by Bethany Farris RN  Outcome: Progressing  10/8/2024 0922 by Bethany Farris RN  Outcome: Progressing     Problem: Nutrition/Hydration-ADULT  Goal: Nutrient/Hydration intake appropriate for improving, restoring or maintaining nutritional needs  Description: Monitor and assess patient's nutrition/hydration status for malnutrition. Collaborate with interdisciplinary team and initiate plan and interventions as ordered.  Monitor patient's weight and dietary intake as ordered or per policy. Utilize nutrition screening tool and intervene as necessary. Determine patient's food preferences and provide high-protein, high-caloric foods as appropriate.     INTERVENTIONS:  - Monitor oral intake, urinary output, labs, and treatment plans  - Assess nutrition and hydration status and recommend course of action  - Evaluate amount of meals eaten  - Assist patient with eating if necessary   - Allow adequate time for meals  - Recommend/ encourage appropriate diets, oral nutritional supplements, and vitamin/mineral supplements  - Order, calculate, and assess calorie counts as needed  - Recommend, monitor, and adjust tube feedings and TPN/PPN based on assessed needs  - Assess need for intravenous fluids  - Provide specific nutrition/hydration education as appropriate  - Include patient/family/caregiver in decisions related to nutrition  10/8/2024 0923 by Bethany Farris RN  Outcome: Progressing  10/8/2024 0922 by Bethany Farris RN  Outcome: Progressing  10/8/2024 0922 by Bethany Farris RN  Outcome: Progressing

## 2024-10-08 NOTE — RESPIRATORY THERAPY NOTE
RT Ventilator Management Note  Hemanth Swanson 39 y.o. male MRN: [id number]  Unit/Bed#: ICU 05 Encounter: [id number]      Daily Screen         10/6/2024  0844 10/7/2024  0743          Patient safety screen outcome:: Failed Failed      Not Ready for Weaning due to:: Underline problem not resolved Underline problem not resolved      Spont breathing trial outcome:: -- Failed      Spont breathing trial reason failed: -- --                Physical Exam:   Assessment Type: During-treatment  Respiratory Pattern: Assisted  Chest Assessment: Chest expansion symmetrical  Bilateral Breath Sounds: Coarse  Suction: Trach  O2 Device: vent      Resp Comments: Patient recieved on documented settings. Patient trach care done. Supplies at bedside. No SBT. Patient on home vent settings.     10/08/24 0758   Respiratory Assessment   Assessment Type During-treatment   General Appearance   (eyes closed)   Respiratory Pattern Assisted   Chest Assessment Chest expansion symmetrical   Bilateral Breath Sounds Coarse   Suction Trach   Resp Comments Patient recieved on documented settings. Patient trach care done. Supplies at bedside. No SBT. Patient on home vent settings.   O2 Device vent   Vent Information   Vent    Vent type     Vent Mode AC/VC   $ Vital Capacity Mech/Peak Flow Yes   $ Pulse Oximetry Spot Check Charge Completed   SpO2 95 %   AC/VC Settings   Resp Rate (BPM) 14 BPM   Vt (mL) 500 mL   FIO2 (%) 40 %   PEEP (cmH2O) 6 cmH2O   Flow Pattern (LPM) 45 L/min   Trigger Sensitivity Flow (lpm) 3 %   Humidification Heater   Heater Temperature (Set) 98.6 °F (37 °C)   AC/VC Actuals   Resp Rate (BPM) 14 BPM   VT (mL) 499   MV 6.7   MAP (cmH2O) 11 cmH2O   Peak Pressure (cmH2O) 23 cmH2O   I/E Ratio (Obs) 1:1.5   Heater Temperature (Obs) 98.6 °F (37 °C)   Static Compliance (mL/cmH20) 38 mL/cmH2O   Plateau Pressure (cm H2O) 21 cm H2O   AC/VC Alarms   High Peak Pressure (cmH2O) 40   High Resp Rate (BPM) 40 BPM   High MV (L/min) 16  L/min   Low MV (L/min) 3.5 L/min   Vt High (mL) 900 mL   Vt Low (mL) 200 mL   AC/VC Apnea Settings   Resp Rate (BPM) 14 BPM   VT (mL) 500 mL   FIO2 (%) 100 %   Apnea Time (s) 20 S   Apnea Flow (L/min) 45 L/min   Maintenance   Alarm (pink) cable attached Yes   Resuscitation bag with peep valve at bedside Yes   Water bag changed No   Circuit changed No   Surgical Airway Distal;Cuffed;Other (Comment)   Placement Date/Time: 06/01/24 115   Tube Size: 6  Placed By: Physician  Placed by (Name): Dr. Ronquillo  Type: Tracheostomy  Style: (c) Distal;Cuffed;Other (Comment)   Status Cuff Inflated;Secured   Site Assessment Clean;Dry   Site Care Cleansed;Dried;Dressing applied   Inner Cannula Care Changed/new   Ties Assessment Intact;Secure;Clean;Dry   Surgical Airway Cuff Pressure (color) Green   Equipment at bedside BVM;Wall Suction setup;Additional complete same size trach tube;Additional complete one size smaller trach tube;Obturator;Sterile saline;Additional inner cannula

## 2024-10-08 NOTE — NURSING NOTE
2113 Pt found red, diaphoretic, and appearing to be bearing down by RN. Pts eyes were rolled back, fists clenched, and UE shaking. HR in the 60s, O2 85%. RN gave patient 100% oxygen through the ventilator and sat improved to 98%. senior care at the bedside.

## 2024-10-08 NOTE — ASSESSMENT & PLAN NOTE
Likely related to hypoxia 2/2 atelectasis vs mucus plugging.  Continue to monitor for clinical response to Abx  Continue supportive care.

## 2024-10-08 NOTE — ASSESSMENT & PLAN NOTE
Hypernatremia with hyperchloremia, likely in the setting of dehydration.  Received 2 L of NS and 2 L of Isolyte at [place].  Repeat CMP with improvement of Na 175 to 165.  Urine osmolality elevated at 910  Urine sodium within normal limits at 93  Serum osmolality elevated at 345  This recent sodium 159  Currently holding fluids.    Plan:  Frequent BMP every 4 hours

## 2024-10-09 ENCOUNTER — APPOINTMENT (INPATIENT)
Dept: CT IMAGING | Facility: HOSPITAL | Age: 39
DRG: 720 | End: 2024-10-09
Payer: COMMERCIAL

## 2024-10-09 ENCOUNTER — APPOINTMENT (INPATIENT)
Dept: RADIOLOGY | Facility: HOSPITAL | Age: 39
DRG: 720 | End: 2024-10-09
Payer: COMMERCIAL

## 2024-10-09 LAB
ANION GAP SERPL CALCULATED.3IONS-SCNC: 4 MMOL/L (ref 4–13)
ANION GAP SERPL CALCULATED.3IONS-SCNC: 5 MMOL/L (ref 4–13)
ATRIAL RATE: 59 BPM
BACTERIA BRONCH AEROBE CULT: ABNORMAL
BACTERIA SPT RESP CULT: ABNORMAL
BASOPHILS # BLD AUTO: 0.06 THOUSANDS/ΜL (ref 0–0.1)
BASOPHILS NFR BLD AUTO: 1 % (ref 0–1)
BUN SERPL-MCNC: 6 MG/DL (ref 5–25)
BUN SERPL-MCNC: 7 MG/DL (ref 5–25)
CA-I BLD-SCNC: 1.2 MMOL/L (ref 1.12–1.32)
CALCIUM SERPL-MCNC: 8.4 MG/DL (ref 8.4–10.2)
CALCIUM SERPL-MCNC: 8.6 MG/DL (ref 8.4–10.2)
CALCIUM SERPL-MCNC: 8.7 MG/DL (ref 8.4–10.2)
CALCIUM SERPL-MCNC: 8.8 MG/DL (ref 8.4–10.2)
CHLORIDE SERPL-SCNC: 118 MMOL/L (ref 96–108)
CHLORIDE SERPL-SCNC: 118 MMOL/L (ref 96–108)
CHLORIDE SERPL-SCNC: 119 MMOL/L (ref 96–108)
CHLORIDE SERPL-SCNC: 122 MMOL/L (ref 96–108)
CO2 SERPL-SCNC: 31 MMOL/L (ref 21–32)
CO2 SERPL-SCNC: 33 MMOL/L (ref 21–32)
CO2 SERPL-SCNC: 34 MMOL/L (ref 21–32)
CO2 SERPL-SCNC: 34 MMOL/L (ref 21–32)
CREAT SERPL-MCNC: 0.72 MG/DL (ref 0.6–1.3)
CREAT SERPL-MCNC: 0.74 MG/DL (ref 0.6–1.3)
CREAT SERPL-MCNC: 0.76 MG/DL (ref 0.6–1.3)
CREAT SERPL-MCNC: 0.79 MG/DL (ref 0.6–1.3)
EOSINOPHIL # BLD AUTO: 0.26 THOUSAND/ΜL (ref 0–0.61)
EOSINOPHIL NFR BLD AUTO: 2 % (ref 0–6)
ERYTHROCYTE [DISTWIDTH] IN BLOOD BY AUTOMATED COUNT: 18.7 % (ref 11.6–15.1)
GFR SERPL CREATININE-BSD FRML MDRD: 114 ML/MIN/1.73SQ M
GFR SERPL CREATININE-BSD FRML MDRD: 116 ML/MIN/1.73SQ M
GFR SERPL CREATININE-BSD FRML MDRD: 117 ML/MIN/1.73SQ M
GFR SERPL CREATININE-BSD FRML MDRD: 119 ML/MIN/1.73SQ M
GLUCOSE SERPL-MCNC: 102 MG/DL (ref 65–140)
GLUCOSE SERPL-MCNC: 103 MG/DL (ref 65–140)
GLUCOSE SERPL-MCNC: 114 MG/DL (ref 65–140)
GLUCOSE SERPL-MCNC: 118 MG/DL (ref 65–140)
GRAM STN SPEC: ABNORMAL
HCT VFR BLD AUTO: 43.2 % (ref 36.5–49.3)
HGB BLD-MCNC: 12.7 G/DL (ref 12–17)
IMM GRANULOCYTES # BLD AUTO: 0.06 THOUSAND/UL (ref 0–0.2)
IMM GRANULOCYTES NFR BLD AUTO: 1 % (ref 0–2)
LYMPHOCYTES # BLD AUTO: 1.11 THOUSANDS/ΜL (ref 0.6–4.47)
LYMPHOCYTES NFR BLD AUTO: 8 % (ref 14–44)
MAGNESIUM SERPL-MCNC: 2.6 MG/DL (ref 1.9–2.7)
MCH RBC QN AUTO: 30.2 PG (ref 26.8–34.3)
MCHC RBC AUTO-ENTMCNC: 29.4 G/DL (ref 31.4–37.4)
MCV RBC AUTO: 103 FL (ref 82–98)
MONOCYTES # BLD AUTO: 0.66 THOUSAND/ΜL (ref 0.17–1.22)
MONOCYTES NFR BLD AUTO: 5 % (ref 4–12)
NEUTROPHILS # BLD AUTO: 11.01 THOUSANDS/ΜL (ref 1.85–7.62)
NEUTS SEG NFR BLD AUTO: 83 % (ref 43–75)
NRBC BLD AUTO-RTO: 0 /100 WBCS
P AXIS: 31 DEGREES
PHOSPHATE SERPL-MCNC: 3.1 MG/DL (ref 2.7–4.5)
PLATELET # BLD AUTO: 188 THOUSANDS/UL (ref 149–390)
PMV BLD AUTO: 10.9 FL (ref 8.9–12.7)
POTASSIUM SERPL-SCNC: 3.1 MMOL/L (ref 3.5–5.3)
POTASSIUM SERPL-SCNC: 3.7 MMOL/L (ref 3.5–5.3)
POTASSIUM SERPL-SCNC: 4.1 MMOL/L (ref 3.5–5.3)
POTASSIUM SERPL-SCNC: 4.6 MMOL/L (ref 3.5–5.3)
PR INTERVAL: 132 MS
QRS AXIS: 68 DEGREES
QRSD INTERVAL: 108 MS
QT INTERVAL: 416 MS
QTC INTERVAL: 411 MS
RBC # BLD AUTO: 4.21 MILLION/UL (ref 3.88–5.62)
SODIUM SERPL-SCNC: 156 MMOL/L (ref 135–147)
SODIUM SERPL-SCNC: 157 MMOL/L (ref 135–147)
SODIUM SERPL-SCNC: 157 MMOL/L (ref 135–147)
SODIUM SERPL-SCNC: 158 MMOL/L (ref 135–147)
T WAVE AXIS: -36 DEGREES
VENTRICULAR RATE: 59 BPM
WBC # BLD AUTO: 13.16 THOUSAND/UL (ref 4.31–10.16)

## 2024-10-09 PROCEDURE — 80048 BASIC METABOLIC PNL TOTAL CA: CPT

## 2024-10-09 PROCEDURE — 84100 ASSAY OF PHOSPHORUS: CPT

## 2024-10-09 PROCEDURE — 85025 COMPLETE CBC W/AUTO DIFF WBC: CPT

## 2024-10-09 PROCEDURE — 70450 CT HEAD/BRAIN W/O DYE: CPT

## 2024-10-09 PROCEDURE — 94640 AIRWAY INHALATION TREATMENT: CPT

## 2024-10-09 PROCEDURE — 94669 MECHANICAL CHEST WALL OSCILL: CPT

## 2024-10-09 PROCEDURE — 94003 VENT MGMT INPAT SUBQ DAY: CPT

## 2024-10-09 PROCEDURE — 99233 SBSQ HOSP IP/OBS HIGH 50: CPT | Performed by: INTERNAL MEDICINE

## 2024-10-09 PROCEDURE — 80048 BASIC METABOLIC PNL TOTAL CA: CPT | Performed by: INTERNAL MEDICINE

## 2024-10-09 PROCEDURE — 94760 N-INVAS EAR/PLS OXIMETRY 1: CPT

## 2024-10-09 PROCEDURE — 99232 SBSQ HOSP IP/OBS MODERATE 35: CPT | Performed by: STUDENT IN AN ORGANIZED HEALTH CARE EDUCATION/TRAINING PROGRAM

## 2024-10-09 PROCEDURE — 93005 ELECTROCARDIOGRAM TRACING: CPT

## 2024-10-09 PROCEDURE — 94150 VITAL CAPACITY TEST: CPT

## 2024-10-09 PROCEDURE — NC001 PR NO CHARGE: Performed by: STUDENT IN AN ORGANIZED HEALTH CARE EDUCATION/TRAINING PROGRAM

## 2024-10-09 PROCEDURE — 94664 DEMO&/EVAL PT USE INHALER: CPT

## 2024-10-09 PROCEDURE — 99255 IP/OBS CONSLTJ NEW/EST HI 80: CPT | Performed by: PSYCHIATRY & NEUROLOGY

## 2024-10-09 PROCEDURE — 93010 ELECTROCARDIOGRAM REPORT: CPT | Performed by: STUDENT IN AN ORGANIZED HEALTH CARE EDUCATION/TRAINING PROGRAM

## 2024-10-09 PROCEDURE — 82330 ASSAY OF CALCIUM: CPT

## 2024-10-09 PROCEDURE — 71045 X-RAY EXAM CHEST 1 VIEW: CPT

## 2024-10-09 PROCEDURE — 83735 ASSAY OF MAGNESIUM: CPT

## 2024-10-09 RX ORDER — ATROPINE SULFATE 0.1 MG/ML
0.5 INJECTION INTRAVENOUS ONCE
Status: COMPLETED | OUTPATIENT
Start: 2024-10-09 | End: 2024-10-09

## 2024-10-09 RX ORDER — BISACODYL 10 MG
10 SUPPOSITORY, RECTAL RECTAL ONCE
Status: COMPLETED | OUTPATIENT
Start: 2024-10-09 | End: 2024-10-09

## 2024-10-09 RX ORDER — POTASSIUM CHLORIDE 14.9 MG/ML
20 INJECTION INTRAVENOUS ONCE
Status: COMPLETED | OUTPATIENT
Start: 2024-10-09 | End: 2024-10-09

## 2024-10-09 RX ORDER — POTASSIUM CHLORIDE 20MEQ/15ML
40 LIQUID (ML) ORAL ONCE
Status: COMPLETED | OUTPATIENT
Start: 2024-10-09 | End: 2024-10-09

## 2024-10-09 RX ORDER — LORAZEPAM 2 MG/ML
2 INJECTION INTRAMUSCULAR ONCE
Status: COMPLETED | OUTPATIENT
Start: 2024-10-09 | End: 2024-10-09

## 2024-10-09 RX ORDER — BISACODYL 10 MG
10 SUPPOSITORY, RECTAL RECTAL DAILY PRN
Status: DISCONTINUED | OUTPATIENT
Start: 2024-10-09 | End: 2024-10-31 | Stop reason: HOSPADM

## 2024-10-09 RX ORDER — LORAZEPAM 2 MG/ML
INJECTION INTRAMUSCULAR
Status: COMPLETED
Start: 2024-10-09 | End: 2024-10-09

## 2024-10-09 RX ORDER — POTASSIUM CHLORIDE 20MEQ/15ML
40 LIQUID (ML) ORAL ONCE
Status: DISCONTINUED | OUTPATIENT
Start: 2024-10-09 | End: 2024-10-09

## 2024-10-09 RX ORDER — DOPAMINE HYDROCHLORIDE 160 MG/100ML
5-20 INJECTION, SOLUTION INTRAVENOUS
Status: DISCONTINUED | OUTPATIENT
Start: 2024-10-09 | End: 2024-10-12

## 2024-10-09 RX ORDER — POTASSIUM CHLORIDE 20MEQ/15ML
20 LIQUID (ML) ORAL ONCE
Status: DISCONTINUED | OUTPATIENT
Start: 2024-10-09 | End: 2024-10-09

## 2024-10-09 RX ORDER — DEXTROSE MONOHYDRATE 50 MG/ML
150 INJECTION, SOLUTION INTRAVENOUS CONTINUOUS
Status: DISCONTINUED | OUTPATIENT
Start: 2024-10-09 | End: 2024-10-10

## 2024-10-09 RX ADMIN — PIPERACILLIN SODIUM AND TAZOBACTAM SODIUM 4.5 G: 36; 4.5 INJECTION, POWDER, LYOPHILIZED, FOR SOLUTION INTRAVENOUS at 16:52

## 2024-10-09 RX ADMIN — PIPERACILLIN SODIUM AND TAZOBACTAM SODIUM 4.5 G: 36; 4.5 INJECTION, POWDER, LYOPHILIZED, FOR SOLUTION INTRAVENOUS at 22:58

## 2024-10-09 RX ADMIN — SENNOSIDES AND DOCUSATE SODIUM 2 TABLET: 8.6; 5 TABLET ORAL at 09:13

## 2024-10-09 RX ADMIN — PIPERACILLIN SODIUM AND TAZOBACTAM SODIUM 4.5 G: 36; 4.5 INJECTION, POWDER, LYOPHILIZED, FOR SOLUTION INTRAVENOUS at 12:00

## 2024-10-09 RX ADMIN — DOPAMINE HYDROCHLORIDE IN DEXTROSE 12.5 MCG/KG/MIN: 1.6 INJECTION, SOLUTION INTRAVENOUS at 20:43

## 2024-10-09 RX ADMIN — ACETYLCYSTEINE 600 MG: 200 SOLUTION ORAL; RESPIRATORY (INHALATION) at 19:36

## 2024-10-09 RX ADMIN — APIXABAN 5 MG: 5 TABLET, FILM COATED ORAL at 09:13

## 2024-10-09 RX ADMIN — LEVALBUTEROL HYDROCHLORIDE 1.25 MG: 1.25 SOLUTION RESPIRATORY (INHALATION) at 19:36

## 2024-10-09 RX ADMIN — LORAZEPAM 2 MG: 2 INJECTION INTRAMUSCULAR; INTRAVENOUS at 03:12

## 2024-10-09 RX ADMIN — POTASSIUM CHLORIDE 40 MEQ: 1.5 SOLUTION ORAL at 11:53

## 2024-10-09 RX ADMIN — ACETYLCYSTEINE 600 MG: 200 SOLUTION ORAL; RESPIRATORY (INHALATION) at 07:30

## 2024-10-09 RX ADMIN — ATROPINE SULFATE 0.5 MG: 0.1 INJECTION INTRAVENOUS at 04:25

## 2024-10-09 RX ADMIN — FENTANYL CITRATE 50 MCG: 50 INJECTION INTRAMUSCULAR; INTRAVENOUS at 00:11

## 2024-10-09 RX ADMIN — ACETYLCYSTEINE 600 MG: 200 SOLUTION ORAL; RESPIRATORY (INHALATION) at 02:43

## 2024-10-09 RX ADMIN — LORAZEPAM 2 MG: 2 INJECTION INTRAMUSCULAR at 03:12

## 2024-10-09 RX ADMIN — LEVALBUTEROL HYDROCHLORIDE 1.25 MG: 1.25 SOLUTION RESPIRATORY (INHALATION) at 02:43

## 2024-10-09 RX ADMIN — CHLORHEXIDINE GLUCONATE 15 ML: 1.2 RINSE ORAL at 21:43

## 2024-10-09 RX ADMIN — ACETYLCYSTEINE 600 MG: 200 SOLUTION ORAL; RESPIRATORY (INHALATION) at 13:08

## 2024-10-09 RX ADMIN — BISACODYL 10 MG: 10 SUPPOSITORY RECTAL at 11:53

## 2024-10-09 RX ADMIN — POLYETHYLENE GLYCOL 3350 17 G: 17 POWDER, FOR SOLUTION ORAL at 09:13

## 2024-10-09 RX ADMIN — LEVALBUTEROL HYDROCHLORIDE 1.25 MG: 1.25 SOLUTION RESPIRATORY (INHALATION) at 13:08

## 2024-10-09 RX ADMIN — APIXABAN 5 MG: 5 TABLET, FILM COATED ORAL at 17:39

## 2024-10-09 RX ADMIN — POTASSIUM CHLORIDE 20 MEQ: 14.9 INJECTION, SOLUTION INTRAVENOUS at 11:54

## 2024-10-09 RX ADMIN — FENTANYL CITRATE 50 MCG: 50 INJECTION INTRAMUSCULAR; INTRAVENOUS at 03:07

## 2024-10-09 RX ADMIN — DOPAMINE HYDROCHLORIDE IN DEXTROSE 5 MCG/KG/MIN: 1.6 INJECTION, SOLUTION INTRAVENOUS at 12:22

## 2024-10-09 RX ADMIN — DEXTROSE 50 ML/HR: 5 SOLUTION INTRAVENOUS at 05:04

## 2024-10-09 RX ADMIN — LEVALBUTEROL HYDROCHLORIDE 1.25 MG: 1.25 SOLUTION RESPIRATORY (INHALATION) at 07:29

## 2024-10-09 RX ADMIN — POTASSIUM CHLORIDE 20 MEQ: 14.9 INJECTION, SOLUTION INTRAVENOUS at 14:14

## 2024-10-09 RX ADMIN — CHLORHEXIDINE GLUCONATE 15 ML: 1.2 RINSE ORAL at 09:13

## 2024-10-09 NOTE — ASSESSMENT & PLAN NOTE
Hypernatremia with hyperchloremia, likely in the setting of dehydration.  Received 2 L of NS and 2 L of Isolyte at [employer].  Repeat CMP with improvement of Na 175 to 165.  Urine osmolality elevated at 910  Urine sodium within normal limits at 93  Serum osmolality elevated at 345  Likely has roughly 3 L free water deficit.  Was on free water flushes at 250 every 4 hours.  D5W started at a rate of 50 cc/h last night increased to 75 cc/h this morning.    Plan:  Increase free water flushes to 400 cc.  Continue D5W at 75 cc/h.  Frequent BMP every 4 hours

## 2024-10-09 NOTE — CASE MANAGEMENT
Case Management Progress Note    Patient name Hemanth Swanson  Location ICU 05/ICU 05 MRN [id number]  : 1985 Date 10/9/2024       LOS (days): 4  Geometric Mean LOS (GMLOS) (days):   Days to GMLOS:        OBJECTIVE:        Current admission status: Inpatient  Preferred Pharmacy:   [facility]/pharmacy #[id number] - [facility] [address]  [address] [address]  Phone: [phone] Fax: [phone]    Primary Care Provider: Ela Douglas MD    Primary Insurance: [facility]  Secondary Insurance:     PROGRESS NOTE:      Pt discussed during mobility rounds. Plan to request consults for PT/OT evaluations.     CM placed referrals to [facility] agencies to see if there are any available options for PT/OT in the home. As per [place] there are limited agencies due to pt's insurance. Will f/u.     CM spoke with Hilario from [employer]. As per [facility] Whitehead is pt's RT at home. Pt does have two vents. As per Hilario any changes in the vent settings they would need a new script for. Also requested that they are notified once pt is ready for dc.

## 2024-10-09 NOTE — PROGRESS NOTES
NEPHROLOGY HOSPITAL PROGRESS NOTE   Hemanth Swanson 39 y.o. male MRN: [id number]  Unit/Bed#: ICU 05 Encounter: [id number]  Reason for Consult: Hypernatremia    ASSESSMENT and PLAN:  Hemanth Swanson is a 39 y.o. male who was admitted to [facility] after presenting with dislodged PEG tube and left-sided consolidation. A renal consultation is requested for assistance in the management of hypernatremia.       1.  Hypernatremia.  This is due to lack of free water intake.  Urine osmolality of 910 not suggestive of DI and suggests dehydration.  Serum sodium level on admission 175 at 7:34 PM on 10/05/2024.  Treated with D5W on 10/06 and 10/07 with subsequent discontinuation on 10/07 due to concern for overcorrection.  On 10/08 for sodium level of 159, he was started on free water flushes 250 cc every 4 hours via PEG tube on 10/08 with no improvement in serum sodium level.  Free water deficit to bring serum sodium level down to 148 is around 5 L accounting for insensible losses.  Increase free water flushes to 400 cc every 4 hours and agree with D5W at 75 cc/h.  Goal serum sodium can be around 145-148 by tomorrow morning.  Trend BMP every 6 hours.    2.  At risk for hypokalemia.  He is currently receiving D5W which can induce hypokalemia.  Continue to monitor potassium level and supplement as needed.     3.  Sepsis.  Currently being treated for pneumonia.  Antibiotics per ICU team.     4.  Chronic respiratory failure with hypoxia and hypercapnia.  Ventilator support per ICU.    Discussed with ICU resident team.  After discussion, we agreed to increase free water flushes to 400 cc every 4 hours and to continue D5W at 75 cc/h.    SUBJECTIVE / 24H INTERVAL HISTORY:  Urine output recorded as 516 cc.  Review of systems is not available as he is on a ventilator.    OBJECTIVE:  Current Weight: Weight - Scale: 99 kg (218 lb 4.1 oz)  Vitals:    10/09/24 0615 10/09/24 0630 10/09/24 0700 10/09/24 0730   BP: 100/63  98/66     Pulse:       Resp:       Temp:   98 °F (36.7 °C)    TempSrc:   Oral    SpO2:    99%   Weight:       Height:           Intake/Output Summary (Last 24 hours) at 10/9/2024 0909  Last data filed at 10/9/2024 0625  Gross per 24 hour   Intake 1250.15 ml   Output 516 ml   Net 734.15 ml     Review of Systems   Unable to perform ROS: Acuity of condition     Physical Exam  Vitals and nursing note reviewed.   Constitutional:       Appearance: He is well-developed. He is ill-appearing.   Eyes:      Conjunctiva/sclera: Conjunctivae normal.   Neck:      Comments: Tracheostomy present  Cardiovascular:      Rate and Rhythm: Normal rate and regular rhythm.      Heart sounds: No murmur heard.  Pulmonary:      Effort: Pulmonary effort is normal. No respiratory distress.      Breath sounds: Normal breath sounds.   Abdominal:      Palpations: Abdomen is soft.      Comments: PEG tube present   Musculoskeletal:         General: No swelling.      Cervical back: Neck supple.      Right lower leg: No edema.      Left lower leg: No edema.   Skin:     General: Skin is warm and dry.   Neurological:      Mental Status: Mental status is at baseline.         Medications:    Current Facility-Administered Medications:     acetaminophen (TYLENOL) oral suspension 650 mg, 650 mg, Oral, Q6H PRN, Mohsen Moon MD    acetylcysteine (MUCOMYST) 200 mg/mL inhalation solution 600 mg, 3 mL, Nebulization, Q6H, Ne Low MD, 600 mg at 10/09/24 0730    apixaban (ELIQUIS) tablet 5 mg, 5 mg, Per PEG Tube, BID, Mohsen Moon MD, 5 mg at 10/08/24 1823    cefTRIAXone (ROCEPHIN) 2,000 mg in dextrose 5 % 50 mL IVPB, 2,000 mg, Intravenous, Q24H, Ne Low MD, Last Rate: 100 mL/hr at 10/08/24 1538, 2,000 mg at 10/08/24 1538    chlorhexidine (PERIDEX) 0.12 % oral rinse 15 mL, 15 mL, Mouth/Throat, Q12H ISAEL, Mohsen Moon MD, 15 mL at 10/08/24 2020    dextrose 5 % infusion, 75 mL/hr, Intravenous, Continuous, Ne East  MD Devante, Last Rate: 75 mL/hr at 10/09/24 0620, 75 mL/hr at 10/09/24 0620    fentaNYL injection 50 mcg, 50 mcg, Intravenous, Q2H PRN, AMBER Tucker, 50 mcg at 10/09/24 0307    levalbuterol (XOPENEX) inhalation solution 1.25 mg, 1.25 mg, Nebulization, Q6H, Silvio Alas MD, 1.25 mg at 10/09/24 0729    NOREPINEPHRINE 4 MG  ML NSS (CMPD ORDER) infusion, 1-30 mcg/min, Intravenous, Titrated, Silvio Alas MD, Last Rate: 3.8 mL/hr at 10/09/24 0504, 1 mcg/min at 10/09/24 0504    polyethylene glycol (MIRALAX) packet 17 g, 17 g, Per PEG Tube, Daily, Ne Low MD, 17 g at 10/08/24 0838    senna-docusate sodium (SENOKOT S) 8.6-50 mg per tablet 2 tablet, 2 tablet, Oral, BID, Ne Low MD, 2 tablet at 10/08/24 1823    Laboratory Results:  Results from last 7 days   Lab Units 10/09/24  0421 10/08/24  2332 10/08/24  1825 10/08/24  1309 10/08/24  0746 10/08/24  0344 10/07/24  2347 10/07/24  2107 10/07/24  1749 10/07/24  1435 10/07/24  0958 10/07/24  0422 10/06/24  1616 10/06/24  1235 10/06/24  0810 10/06/24  0425 10/05/24  2312 10/05/24  2311 10/05/24  1934   WBC Thousand/uL 13.16*  --   --   --   --  8.40  --   --   --   --   --  10.55*  --   --   --  15.56*  --  15.66* 14.76*   HEMOGLOBIN g/dL 12.7  --   --   --   --  11.2*  --   --   --   --   --  12.1  --   --   --  14.3  --  14.4 18.2*   HEMATOCRIT % 43.2  --   --   --   --  37.6  --   --   --   --   --  40.1  --   --   --  48.2  --  48.5 63.2*   PLATELETS Thousands/uL 188  --   --   --   --  146*  --   --   --   --   --  167  --   --   --  177  --  178 228   POTASSIUM mmol/L 3.7 4.0 4.0 4.1 4.1 2.9*  --  4.0 3.9 4.0   < > 3.4*   < > 3.7   < > 3.7   < >  --  3.3*   CHLORIDE mmol/L 122* 121* 120* 123* 122* 124*  --  121* 121* 123*   < > 120*   < > 121*   < > 126*   < >  --  116*   CO2 mmol/L 31 33* 34* 32 33* 28  --  32 31 30   < > 30   < > 34*   < > 35*   < >  --  38*   BUN mg/dL 6 7 6 6 6 5  --  6 6 6   " < > 6   < > 8   < > 9   < >  --  13   CREATININE mg/dL 0.74 0.80 0.77 0.68 0.69 0.61  --  0.81 0.77 0.74   < > 0.58*   < > 0.73   < > 0.82   < >  --  1.10   CALCIUM mg/dL 8.6 8.8 8.8 8.7 8.7 7.5*  --  8.7 8.9 8.6   < > 8.3*   < > 8.2*   < > 8.2*   < >  --  10.2   MAGNESIUM mg/dL 2.6  --   --   --   --   --  2.4  --  2.4 2.3  --  2.2  --  2.3  --  2.5   < >  --   --    PHOSPHORUS mg/dL 3.1  --   --   --   --   --  3.1  --  2.4* 1.8*  --  1.7*  --  2.9  --  2.3*   < >  --   --     < > = values in this interval not displayed.       Portions of the record may have been created with voice recognition software. Occasional wrong word or \"sound a like\" substitutions may have occurred due to the inherent limitations of voice recognition software. Read the chart carefully and recognize, using context, where substitutions have occurred. If you have any questions, please contact the dictating provider.    "

## 2024-10-09 NOTE — ASSESSMENT & PLAN NOTE
Likely vagal response to coughing.  Spontaneously resolves without intervention.  Received atropine this AM.  Also had repeated seizure-like activity this morning.  Was given 2 mg Ativan with improvement in symptoms.    Plan:  Will continue to monitor.

## 2024-10-09 NOTE — CONSULTS
Consultation - Neurology   Name: Hemanth Swanson 39 y.o. male I MRN: [id number]  Unit/Bed#: ICU 05 I Date of Admission: 10/5/2024   Date of Service: 10/9/2024 I Hospital Day: 4     Inpatient consult to Neurology  Consult performed by: AMBER Juarez  Consult ordered by: Hermelindo Cuadra PA-C        Physician Requesting Evaluation: Silvio Alas,*   Reason for Evaluation / Principal Problem: Seizure-like activity    Assessment & Plan  Seizure-like activity (HCC)  38 y/o male with prior PE on Eliquis, concern for neuromuscular syndrome, metastatic L testicular seminoma, tracheostomy, prior cardiac arrest, G-tube placement, anxiety, depression, bipolar disorder, who presented initially to [facility] on 10/5 after pulling his G-tube out. In ED, he was noted to be tachycardic, hypoxic, febrile, with leukocytosis. He was found to have L sided opacification on CXR and was started on antibiotics. He was subsequently transferred to [facility] ICU for further monitoring and management. While in ICU, he has been having episodes of bradycardia and hypoxia, which seem to occur during coughing episodes or when he is agitated/anxious. He received atropine on 10/8 for an episode of pause/arrest. Overnight into 10/9, patient was noted to have seizure-like activity, described as rhythmic movements and eye rolling that responded to Ativan. No bradycardia/hypoxia reportedly occurred during this. Neurology consulted for further evaluation of seizure-like activity.    Plan:  -Per attending neurologist, recommend CT head and video EEG for further evaluation  -Seizure precautions  -Per attending neurologist, recommend no further Ativan at this time  -Hold off on AED at this time  -PT/OT as able  -Monitor neuro exam; notify with any changes  -Medical management and supportive care per ICU. Correction of any metabolic or infectious disturbances.   -Case and treatment plan reviewed with attending neurologist, Dr. Taylor. Please  see attending attestation for any further recommendations.  Sepsis (HCC)  -Noted to be febrile, tachycardic, with elevated WBC/lactic acid on presentation  -Initial CXR: Complete opacification of the left hemithorax with mediastinal shift to the left indicating at least in part atelectasis.   -Initial labs: lactic acid 12.1, WBC 14.76, COVID/flu/RSV negative  -Blood cultures pending  -UA negative  -Sputum culture positive for Proteus mirabilis, Pseudomonas aeruginosa  -Currently on Zosyn  -Medical management per ICU  Chronic respiratory failure with hypoxia and hypercapnia (HCC)  -Tracheostomy with vent dependence  -Episodes of hypoxia  -Medical management per ICU  Bradycardia  -Noted with HR dropping as low as 30-40s  -Received atropine on 10/8 for episode of arrest/pause and on 10/9 for bradycardia  -Cardiology following; appreciate recommendations   -No recommendation for PPM at this time   -Suspected to be in the setting of vasovagal/agitation  -Telemetry  -Medical management per ICU and Cardiology    Recommendations for outpatient neurological follow up have yet to be determined.    History of Present Illness   Hemanth Swanson is a 39 y.o.  male with prior PE on Eliquis, concern for neuromuscular syndrome, metastatic L testicular seminoma, tracheostomy, prior cardiac arrest, G-tube placement, anxiety, depression, bipolar disorder, who presents with seizure-like activity.    Patient initially presented to [facility] on 10/5 due to pulling out his G-tube. The G-tube was replaced but patient was hypoxic and had fever in the ED. He was also tachycardic with leukocytosis and elevated lactic acid. CXR demonstrated L sided opacification. He was subsequently transferred to [facility] ICU for further monitoring and management. He was started on antibiotics. He was hypernatremic, which was attributed to dehydration. He was noted to have episodes of bradycardia with HR as low as 40s during coughing episodes. The  "bradycardia would resolve spontaneously without intervention. He did receive atropine the morning of 10/8. Cardiology evaluated the patient on 10/8 and suspected that patient's transient episodes of bradycardia/pauses/arrest was \"vasovagal mediated in the context of his underlying lung process\". Cardiology also noted that patient would get quite anxious/agitated when stimulated and he would begin to bear down, turning red and diaphoretic with his HR dropping down into the 30-40s at that time. Overnight into 10/9, patient was witnessed to have seizure-like activity, described as \"rhythmic movements and eye rolling\", which he was given Ativan for with improvement in the episode. Patient was reportedly not bradycardic or hypoxic during the episode but he did have a separate episode of bradycardia, followed by tachycardia, for which he received atropine for. Neurology consulted for further evaluation of seizure-like activity.    Patient has been evaluated by neurology previously on prior admissions, mainly for AMS. Most recently, he was evaluated during an admission in 06/2024 for seizure-like activity. Patient was admitted for pneumonia, bradycardia, and hypoxia at the time. His seizure-like episodes appeared to occur when he was bradycardic and hypoxic. Routine EEG was completed and unremarkable. The seizure-like activity was described as shaking of UE with eyes rolling and altered mental status. He was started on Keppra due to concern for seizure. He underwent further evaluation with vEEG and was taken off Keppra during that time. vEEG did not demonstrate EEG correlate, epileptiform discharges, or electrographic seizures with the episodes captured. MRI brain w/wo contrast was also completed and unremarkable.    Review of Systems   Unable to perform ROS: Other        I have reviewed the patient's PMH, PSH, Social History, Family History, Meds, and Allergies  Historical Information   Past Medical History:   Diagnosis " "Date    Anxiety     Cancer (HCC)     Depression     Migration of percutaneous endoscopic gastrostomy (PEG) tube (HCC) 2023    Psychiatric disorder     bipolar     Past Surgical History:   Procedure Laterality Date    IR BIOPSY LYMPH NODE  2023    IR GASTROSTOMY (G) TUBE CHECK/CHANGE/REINSERTION/UPSIZE  2024    IR GASTROSTOMY TUBE PLACEMENT  2023    IR LUMBAR PUNCTURE  2023    IR PORT PLACEMENT  2023    ORCHIECTOMY Left 2022    Procedure: ORCHIECTOMY INGUINAL;  Surgeon: Flip Michael MD;  Location:  MAIN OR;  Service: Urology    PEG W/TRACHEOSTOMY PLACEMENT N/A 2023    Procedure: TRACHEOSTOMY WITH INSERTION PEG TUBE;  Surgeon: Rudy Mcmanus MD;  Location: [facility] OR;  Service: General    TESTICLE SURGERY       Social History     Tobacco Use    Smoking status: Former     Current packs/day: 0.00     Average packs/day: 0.7 packs/day for 22.7 years (15.0 ttl pk-yrs)     Types: Cigarettes     Start date: 2008     Quit date: 2023     Years since quittin.3    Smokeless tobacco: Never   Vaping Use    Vaping status: Former    Start date: 2018    Quit date: 2023    Substances: Nicotine, THC   Substance and Sexual Activity    Alcohol use: Not Currently     Comment: 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 Not currently using crack cocaine.    Sexual activity: Not Currently     Partners: Female     Birth control/protection: Other     E-Cigarette/Vaping    E-Cigarette Use Former User     Start Date 20     Quit Date 23      E-Cigarette/Vaping Substances    Nicotine Yes     THC Yes     CBD No     Flavoring No     Other No     Unknown No      Family History   Problem Relation Age of Onset    Coronary artery disease Maternal Aunt     Cancer Father     Diabetes Father     Hypertension Father        Current Facility-Administered Medications:     " "acetaminophen (TYLENOL) oral suspension 650 mg, Q6H PRN    acetylcysteine (MUCOMYST) 200 mg/mL inhalation solution 600 mg, Q6H    apixaban (ELIQUIS) tablet 5 mg, BID    [COMPLETED] bisacodyl (DULCOLAX) rectal suppository 10 mg, Once **FOLLOWED BY** bisacodyl (DULCOLAX) rectal suppository 10 mg, Daily PRN    chlorhexidine (PERIDEX) 0.12 % oral rinse 15 mL, Q12H ISAEL    dextrose 5 % infusion, Continuous, Last Rate: 75 mL/hr (10/09/24 0620)    DOPamine (INTROPIN) 400 mg in 250 mL infusion (premix), Titrated, Last Rate: 7.5 mcg/kg/min (10/09/24 1325)    fentaNYL injection 50 mcg, Q2H PRN    levalbuterol (XOPENEX) inhalation solution 1.25 mg, Q6H    NOREPINEPHRINE 4 MG  ML NSS (CMPD ORDER) infusion, Titrated, Last Rate: Stopped (10/09/24 1325)    [COMPLETED] piperacillin-tazobactam (ZOSYN) 4.5 g in sodium chloride 0.9 % 100 mL IV LOADING DOSE, Once, Last Rate: 4.5 g (10/09/24 1200) **FOLLOWED BY** piperacillin-tazobactam (ZOSYN) 4.5 g in sodium chloride 0.9 % 100 mL IVPB (EXTENDED INFUSION), Q8H    polyethylene glycol (MIRALAX) packet 17 g, Daily    [COMPLETED] potassium chloride 20 mEq IVPB (premix), Once, Last Rate: 20 mEq (10/09/24 1154) **FOLLOWED BY** potassium chloride 20 mEq IVPB (premix), Once    [COMPLETED] potassium chloride oral solution 40 mEq, Once **FOLLOWED BY** potassium chloride oral solution 20 mEq, Once    senna-docusate sodium (SENOKOT S) 8.6-50 mg per tablet 2 tablet, BID  Prior to Admission Medications   Prescriptions Last Dose Informant Patient Reported? Taking?   Incontinence Supply Disposable (Comfort Shield Adult Diapers) MISC  Family Member No No   Sig: Use 1 each 4 (four) times a day   NEEDLE, DISP, 18 G 18G X 1\" MISC   No No   Sig: Use 2 (two) times a week   Oral Hygiene Products (Toothette Swabs/Dentifrice) SWAB  Family Member No No   Sig: Apply to the mouth or throat 3 (three) times a day   Syringe/Needle, Disp, (Syringe Luer Slip) 25G X 5/8\" 1 ML MISC   No No   Sig: Use 2 (two) times " a week   apixaban (ELIQUIS) 5 mg  Family Member No No   Si tablet (5 mg total) by Per PEG Tube route 2 (two) times a day   oxygen gas  Family Member Yes No   Sig: Inhale 6 L/min as needed   sodium chloride (BRONCHO SALINE) inhaler solution  Family Member Yes No   Sig: Take 1 spray by nebulization every 8 (eight) hours   testosterone cypionate (DEPO-TESTOSTERONE) 200 mg/mL SOLN   No No   Sig: Inject 0.5 mL (100 mg total) into a muscle 2 (two) times a week      Facility-Administered Medications: None     Dog epithelium    Objective :  Temp:  [96.9 °F (36.1 °C)-98.5 °F (36.9 °C)] 96.9 °F (36.1 °C)  HR:  [42-83] 57  BP: ()/(48-99) 132/82  Resp:  [10-34] 14  SpO2:  [85 %-100 %] 97 %  O2 Device: Ventilator  FiO2 (%):  [40] 40    Physical Exam  Vitals and nursing note reviewed.   Constitutional:       General: He is not in acute distress.     Appearance: He is obese. He is not diaphoretic.      Comments: Bilateral soft wrist restraints in place   HENT:      Head: Normocephalic.      Mouth/Throat:      Mouth: Mucous membranes are moist.      Pharynx: Oropharynx is clear.   Eyes:      General: No scleral icterus.        Right eye: No discharge.         Left eye: No discharge.      Extraocular Movements: Extraocular movements intact.      Conjunctiva/sclera: Conjunctivae normal.      Pupils: Pupils are equal, round, and reactive to light.   Cardiovascular:      Rate and Rhythm: Normal rate.   Pulmonary:      Effort: Pulmonary effort is normal. No respiratory distress.      Comments: Trach in place  Skin:     General: Skin is warm and dry.      Coloration: Skin is not jaundiced or pale.   Neurological:      Mental Status: He is alert.       Neurological Exam  Mental Status  Alert.  Able to shake head yes or no to questions. Able to follow simple commands. Able to choose hospital as current location..    Cranial Nerves  CN III, IV, VI: Extraocular movements intact bilaterally. Pupils equal round and reactive to light  bilaterally.  Bilateral blink to threat  No obvious facial asymmetry  Tongue midline  Conjugate gaze  EOMs intact  PERRL.    Motor  Normal muscle bulk throughout.  Bilateral hand  5-/5  Attempts to raise LUE minimally but unable to fully. When manually raised, able to maintain LUE somewhat antigravity but ultimately drifts down to bed.  When manually raised, RUE drifts down to bed.  Able to raise bilateral LE but not fully. When manually raised, able to maintain bilateral LE antigravity with some drift noted.  Able to wiggle toes bilaterally on command.    Sensory  Light touch is normal in upper and lower extremities.         Lab Results: I have reviewed the following results:I have personally reviewed pertinent reports.  , CBC:   Results from last 7 days   Lab Units 10/09/24  0421 10/08/24  0344 10/07/24  0422   WBC Thousand/uL 13.16* 8.40 10.55*   RBC Million/uL 4.21 3.64* 3.96   HEMOGLOBIN g/dL 12.7 11.2* 12.1   HEMATOCRIT % 43.2 37.6 40.1   MCV fL 103* 103* 101*   PLATELETS Thousands/uL 188 146* 167   , BMP/CMP:   Results from last 7 days   Lab Units 10/09/24  1034 10/09/24  0421 10/08/24  2332 10/08/24  0746 10/08/24  0344 10/06/24  0148 10/05/24  2312 10/05/24  1934   SODIUM mmol/L 156* 158* 160*   < > 159*   < > 165* 175*   POTASSIUM mmol/L 3.1* 3.7 4.0   < > 2.9*   < > 3.4* 3.3*   CHLORIDE mmol/L 119* 122* 121*   < > 124*   < > 125* 116*   CO2 mmol/L 33* 31 33*   < > 28   < > 33* 38*   BUN mg/dL 6 6 7   < > 5   < > 11 13   CREATININE mg/dL 0.72 0.74 0.80   < > 0.61   < > 0.84 1.10   CALCIUM mg/dL 8.4 8.6 8.8   < > 7.5*   < > 8.0* 10.2   AST U/L  --   --   --   --  12*  --  14 20   ALT U/L  --   --   --   --  11  --  19 27   ALK PHOS U/L  --   --   --   --  72  --  78 116*   EGFR ml/min/1.73sq m 119 117 114   < > 127   < > 111 85    < > = values in this interval not displayed.   , Vitamin B12:   , HgBA1C:   , TSH:   Results from last 7 days   Lab Units 10/08/24  0746   TSH 3RD GENERATON uIU/mL 3.179   ,  "Coagulation:   Results from last 7 days   Lab Units 10/08/24  0344   INR  1.46*   , Lipid Profile:   , Ammonia:   , Urinalysis:   Results from last 7 days   Lab Units 10/06/24  0141   COLOR UA  Yellow   CLARITY UA  Turbid   SPEC GRAV UA  1.044*   PH UA  5.5   LEUKOCYTES UA  Trace*   NITRITE UA  Negative   GLUCOSE UA mg/dl Negative   KETONES UA mg/dl 10 (1+)*   BILIRUBIN UA  Negative   BLOOD UA  Negative   , Drug Screen:   , Medication Drug Levels:       Invalid input(s): \"CARBAMAZEPINE\", \"OXCARBAZEPINE\"  Recent Labs     10/09/24  0421 10/09/24  1034   WBC 13.16*  --    HGB 12.7  --    HCT 43.2  --      --    SODIUM 158* 156*   K 3.7 3.1*   * 119*   CO2 31 33*   BUN 6 6   CREATININE 0.74 0.72   GLUC 102 114   CAIONIZED 1.20  --    MG 2.6  --    PHOS 3.1  --      Imaging Results Review: I reviewed radiology reports from this admission including: chest xray.  Other Study Results Review: No additional pertinent studies reviewed.    VTE Prophylaxis: Sequential compression device (Venodyne) and Eliquis  "

## 2024-10-09 NOTE — ASSESSMENT & PLAN NOTE
[facility] ED: febrile, tachycardic, with leukocytosis and a lactate of 12.  X-ray with left sided opacification, increased secretions from tracheostomy, initial pro killian negative. Received weight based fluids.    Transferred to [facility] for further management.  Repeat lab work upon arrival show persistent leukocytosis and hypernatremia.  Urine strep and Legionella negative  [facility] culture revealed Proteus and Pseudomonas    Plan:  Continue antibiotics  Continue vent support, frequent suctioning  Xopenex nebulizers

## 2024-10-09 NOTE — PROGRESS NOTES
General Cardiology   Progress Note -  [facility]   Hemanth ANSELMO Wilburnkristyn 39 y.o. male MRN: [id number]    Unit/Bed#: ICU 05 Encounter: [id number]    Assessment  1.  Bradycardia  -Baseline resting heart rates per prior telemetry/ECG review appear to be in the 50s-60s.  -Episodes of marked bradycardia/sinus pauses occurring predominately w/vagal maneuvers (bearing down, coughing/mucus plugging) w/hypoxia at times.   -Telemetry review  10/8-10/9-predominant sinus bradycardia/NSR with heart rates in the 50s to 60s.  Heart rates down into the upper 30s to 40s overnight while sleeping.  No high grade AVB or pauses noted.   -Telemetry review 10/7-10/8; 10-second sinus arrest with no escape complexes --Per RN staff no clear precipitating events prior to this (not visibly agitated, no coughing), RN was unclear if there was hypoxia during this event.   2. Sepsis  -Management per the CC service.  -Chest x-ray-Complete opacification of the left hemithorax with mediastinal shift to the left indicating at least in part atelectasis.   -Procalcitonin 0.14--0.08  -Afebrile currently, leukocytosis improving since admission.  -On IV antibiotics.  3. Acute on chronic respiratory failure, ventilator dependent.  -Chronic respiratory failure - secondary to neuromuscular disease.  Acute respiratory failure secondary to mucous plugging/PNA).   -Management per the CC service.  4. Chronic HFpEF  -Appears euvolemic on exam.  -BNP level 11.  -TTE 6/11/2024; LVEF 65%, wall motion normal, RV dilated/RV systolic function is normal, LA/RA normal in size, no significant valvular disease.  -Outpatient diuretic regimen; none.  -Inpatient diuretic regimen; none.  5. Testicular cancer  -S/p right orchiectomy 12/2022  -Received cisplatin/etoposide - currently on HOLD  6. Obesity; BMI 26.5     Plan  -Suspect the primary driving factor for his transient episodes of bradycardia / pauses / arrest is vasovagal mediated in the context of his underlying lung processes  (PNA and mucous plugging) he also appears to get quite anxious/agitated when stimulated and begins to bear down, turns red and gets diaphoretic --this occurred during/shortly thereafter my evaluation with my attending physician on 10/9-- there was no hypoxia but heart rates did drop down into the upper 30s low 40s.  Telemetry reviewed from overnight, predominantly sinus bradycardia with heart rates in the upper 30s to 40s while sleeping, and currently in the low to mid 50s while awake.  No high-grade AVB or pauses noted. At this time there is no acute indication for a PPM.   -Continue to optimize underlying lung/infectious processes (PNA/mucus plugging) and correction of electrolytes disturbances. Appears to be an anxiety/agitation component to this as well; would limit sedation and try low dose anti anxiolytics if needed. Try to avoid any medical therapies that can contribute to bradycardia. Can utilize atropine if brooke and becomes hemodynamically unstable or utilize IV dopamine for situations of sustained / marked bradycardia.   -Sepsis/PNA management per the CC service.  -Needs aggressive pulmonary hygiene.  -Monitor electrolytes closely.  Replete to maintain K+ level of 4.0/magnesium level at 2.0.  -Continue to monitor closely on telemetry.    Subjective  Review of Systems   Unable to perform ROS: Patient nonverbal       Objective:   Physical Exam  Vitals and nursing note reviewed.   Constitutional:       Appearance: He is ill-appearing.   Eyes:      General: No scleral icterus.  Cardiovascular:      Rate and Rhythm: Regular rhythm. Bradycardia present.      Pulses: Normal pulses.   Pulmonary:      Effort: Pulmonary effort is normal.   Abdominal:      Palpations: Abdomen is soft.   Musculoskeletal:      Right lower leg: No edema.      Left lower leg: No edema.   Skin:     General: Skin is warm and dry.   Neurological:      Mental Status: He is alert.      Comments: Alert  Follows commands, nods head to certain  "questions.   Appears to be moving all extremities.    Psychiatric:         Mood and Affect: Mood normal.         Vitals: Blood pressure 103/57, pulse 61, temperature 98 °F (36.7 °C), temperature source Oral, resp. rate 16, height 6' 4\" (1.93 m), weight 99 kg (218 lb 4.1 oz), SpO2 97%.,     Body mass index is 26.57 kg/m².,   Systolic (24hrs), Av , Min:76 , Max:166     Diastolic (24hrs), Av, Min:50, Max:99      Intake/Output Summary (Last 24 hours) at 10/9/2024 1038  Last data filed at 10/9/2024 1001  Gross per 24 hour   Intake 1490.72 ml   Output 614 ml   Net 876.72 ml     Weight (last 2 days)       Date/Time Weight    10/09/24 0245 99 (218.26)    10/08/24 0600 99 (218.26)    10/07/24 0541 98.8 (217.81)            LABORATORY RESULTS      CBC with diff:   Results from last 7 days   Lab Units 10/09/24  0421 10/08/24  0344 10/07/24  0422 10/06/24  0425 10/05/24  2311 10/05/24  1934   WBC Thousand/uL 13.16* 8.40 10.55* 15.56* 15.66* 14.76*   HEMOGLOBIN g/dL 12.7 11.2* 12.1 14.3 14.4 18.2*   HEMATOCRIT % 43.2 37.6 40.1 48.2 48.5 63.2*   MCV fL 103* 103* 101* 103* 103* 107*   PLATELETS Thousands/uL 188 146* 167 177 178 228   RBC Million/uL 4.21 3.64* 3.96 4.66 4.72 5.91*   MCH pg 30.2 30.8 30.6 30.7 30.5 30.8   MCHC g/dL 29.4* 29.8* 30.2* 29.7* 29.7* 28.8*   RDW % 18.7* 18.3* 18.3* 18.8* 18.6* 19.1*   MPV fL 10.9 10.9 10.4 10.2 10.6 10.4   NRBC AUTO /100 WBCs 0 0  --  0 0 0       CMP:  Results from last 7 days   Lab Units 10/09/24  0421 10/08/24  2332 10/08/24  1825 10/08/24  1309 10/08/24  0746 10/08/24  0344 10/07/24  2107 10/06/24  0148 10/05/24  2312 10/05/24  1934   POTASSIUM mmol/L 3.7 4.0 4.0 4.1 4.1 2.9* 4.0   < > 3.4* 3.3*   CHLORIDE mmol/L 122* 121* 120* 123* 122* 124* 121*   < > 125* 116*   CO2 mmol/L 31 33* 34* 32 33* 28 32   < > 33* 38*   BUN mg/dL 6 7 6 6 6 5 6   < > 11 13   CREATININE mg/dL 0.74 0.80 0.77 0.68 0.69 0.61 0.81   < > 0.84 1.10   CALCIUM mg/dL 8.6 8.8 8.8 8.7 8.7 7.5* 8.7   < > 8.0* " "10.2   AST U/L  --   --   --   --   --  12*  --   --  14 20   ALT U/L  --   --   --   --   --  11  --   --  19 27   ALK PHOS U/L  --   --   --   --   --  72  --   --  78 116*   EGFR ml/min/1.73sq m 117 114 115 122 121 127 113   < > 111 85    < > = values in this interval not displayed.       BMP:  Results from last 7 days   Lab Units 10/09/24  0421 10/08/24  2332 10/08/24  1825 10/08/24  1309 10/08/24  0746 10/08/24  0344 10/07/24  2107   POTASSIUM mmol/L 3.7 4.0 4.0 4.1 4.1 2.9* 4.0   CHLORIDE mmol/L 122* 121* 120* 123* 122* 124* 121*   CO2 mmol/L 31 33* 34* 32 33* 28 32   BUN mg/dL 6 7 6 6 6 5 6   CREATININE mg/dL 0.74 0.80 0.77 0.68 0.69 0.61 0.81   CALCIUM mg/dL 8.6 8.8 8.8 8.7 8.7 7.5* 8.7       No results found for: \"NTBNP\"     Results from last 7 days   Lab Units 10/09/24  0421 10/07/24  2347 10/07/24  1749 10/07/24  1435 10/07/24  0422 10/06/24  1235 10/06/24  0425   MAGNESIUM mg/dL 2.6 2.4 2.4 2.3 2.2 2.3 2.5             Results from last 7 days   Lab Units 10/08/24  0746   TSH 3RD GENERATON uIU/mL 3.179       Results from last 7 days   Lab Units 10/08/24  0344 10/05/24  2020   INR  1.46* 1.63*       Lipid Profile:   No results found for: \"CHOL\"  Lab Results   Component Value Date    HDL 45 12/10/2022     Lab Results   Component Value Date    LDLCALC 82 12/10/2022     Lab Results   Component Value Date    TRIG 280 (H) 12/10/2022       Cardiac testing:   No results found for this or any previous visit.    No results found for this or any previous visit.    No results found for this or any previous visit.    No valid procedures specified.  No results found for this or any previous visit.      Meds/Allergies   all current active meds have been reviewed and current meds:   Current Facility-Administered Medications:     acetaminophen (TYLENOL) oral suspension 650 mg, Q6H PRN    acetylcysteine (MUCOMYST) 200 mg/mL inhalation solution 600 mg, Q6H    apixaban (ELIQUIS) tablet 5 mg, BID    cefTRIAXone (ROCEPHIN) " 2,000 mg in dextrose 5 % 50 mL IVPB, Q24H, Last Rate: 2,000 mg (10/08/24 1538)    chlorhexidine (PERIDEX) 0.12 % oral rinse 15 mL, Q12H ISAEL    dextrose 5 % infusion, Continuous, Last Rate: 75 mL/hr (10/09/24 0620)    fentaNYL injection 50 mcg, Q2H PRN    levalbuterol (XOPENEX) inhalation solution 1.25 mg, Q6H    NOREPINEPHRINE 4 MG  ML NSS (CMPD ORDER) infusion, Titrated, Last Rate: Stopped (10/09/24 0900)    polyethylene glycol (MIRALAX) packet 17 g, Daily    senna-docusate sodium (SENOKOT S) 8.6-50 mg per tablet 2 tablet, BID  dextrose, 75 mL/hr, Last Rate: 75 mL/hr (10/09/24 0620)  norepinephrine, 1-30 mcg/min, Last Rate: Stopped (10/09/24 0900)          Counseling / Coordination of Care  Total floor / unit time spent today 20 minutes.  Greater than 50% of total time was spent with the patient and / or family counseling and / or coordination of care.      ** Please Note: Dragon 360 Dictation voice to text software may have been used in the creation of this document. **

## 2024-10-09 NOTE — ASSESSMENT & PLAN NOTE
38 y/o male with prior PE on Eliquis, concern for neuromuscular syndrome, metastatic L testicular seminoma, tracheostomy, prior cardiac arrest, G-tube placement, anxiety, depression, bipolar disorder, who presented initially to [facility] on 10/5 after pulling his G-tube out. In ED, he was noted to be tachycardic, hypoxic, febrile, with leukocytosis. He was found to have L sided opacification on CXR and was started on antibiotics. He was subsequently transferred to [facility] ICU for further monitoring and management. While in ICU, he has been having episodes of bradycardia and hypoxia, which seem to occur during coughing episodes or when he is agitated/anxious. He received atropine on 10/8 for an episode of pause/arrest. Overnight into 10/9, patient was noted to have seizure-like activity, described as rhythmic movements and eye rolling that responded to Ativan. No bradycardia/hypoxia reportedly occurred during this. Neurology consulted for further evaluation of seizure-like activity.    Plan:  -Per attending neurologist, recommend CT head and video EEG for further evaluation  -Seizure precautions  -Per attending neurologist, recommend no further Ativan at this time  -Hold off on AED at this time  -PT/OT as able  -Monitor neuro exam; notify with any changes  -Medical management and supportive care per ICU. Correction of any metabolic or infectious disturbances.   -Case and treatment plan reviewed with attending neurologist, Dr. Taylor. Please see attending attestation for any further recommendations.

## 2024-10-09 NOTE — ASSESSMENT & PLAN NOTE
Neuromuscular disease post chemotherapyS/P tracheostomy in 9/2023.  Vent dependent 24/7: current vent settings 14/500/6/60  Last bronchoscopy 6/1 with cultures growing serratia and pseudomonas.  Chest X-ray 10/5: Left sided opacification with possible mucus plugging.  CXR 10/7: Near complete opacification of the left hemithorax with leftward mediastinal shift, mildly improved from 10/5/2024, suggesting baseline underlying atelectasis and pleural effusion.     Chest x-ray 10/9 seems improved from prior.    Plan:  Continue vent support  Xopnex nebs TID

## 2024-10-09 NOTE — PROGRESS NOTES
Progress Note - Critical Care/ICU   Name: Hemanth Swanson 39 y.o. male I MRN: [id number]  Unit/Bed#: ICU 05 I Date of Admission: 10/5/2024   Date of Service: 10/9/2024 I Hospital Day: 4      Assessment & Plan  Sepsis (HCC)  [facility] ED: febrile, tachycardic, with leukocytosis and a lactate of 12.  X-ray with left sided opacification, increased secretions from tracheostomy, initial pro killian negative. Received weight based fluids.    Transferred to [facility] for further management.  Repeat lab work upon arrival show persistent leukocytosis and hypernatremia.  Urine strep and Legionella negative  Bronc culture revealed Proteus and Pseudomonas    Plan:  Continue antibiotics  Continue vent support, frequent suctioning  Xopenex nebulizers  Hypernatremia  Hypernatremia with hyperchloremia, likely in the setting of dehydration.  Received 2 L of NS and 2 L of Isolyte at [facility].  Repeat CMP with improvement of Na 175 to 165.  Urine osmolality elevated at 910  Urine sodium within normal limits at 93  Serum osmolality elevated at 345  Likely has roughly 3 L free water deficit.  Was on free water flushes at 250 every 4 hours.  D5W started at a rate of 50 cc/h last night increased to 75 cc/h this morning.    Plan:  Increase free water flushes to 400 cc.  Continue D5W at 75 cc/h.  Frequent BMP every 4 hours  Chronic respiratory failure with hypoxia and hypercapnia ([facility])  Neuromuscular disease post chemotherapyS/P tracheostomy in 9/2023.  Vent dependent 24/7: current vent settings 14/500/6/60  Last bronchoscopy 6/1 with cultures growing serratia and pseudomonas.  Chest X-ray 10/5: Left sided opacification with possible mucus plugging.  CXR 10/7: Near complete opacification of the left hemithorax with leftward mediastinal shift, mildly improved from 10/5/2024, suggesting baseline underlying atelectasis and pleural effusion.     Chest x-ray 10/9 seems improved from prior.    Plan:  Continue vent support  Xopnex nebs TID  Toxic  metabolic encephalopathy  Likely related to hypoxia 2/2 atelectasis vs mucus plugging.  Continue to monitor for clinical response to Abx  Continue supportive care.  Bradycardia  Likely vagal response to coughing.  Spontaneously resolves without intervention.  Received atropine this AM.  Also had repeated seizure-like activity this morning.  Was given 2 mg Ativan with improvement in symptoms.    Plan:  Will continue to monitor.  Disposition: Critical care    ICU Core Measures     Vented Patient  VAP Bundle  VAP bundle ordered     A: Assess, Prevent, and Manage Pain Has pain been assessed? Yes  Need for changes to pain regimen? No   B: Both Spontaneous Awakening Trials (SATs) and Spontaneous Breathing Trials (SBTs) Plan to perform spontaneous awakening trial today? Yes   Plan to perform spontaneous breathing trial today? N/A trach  Obvious barriers to extubation? Yes   C: Choice of Sedation RASS Goal: -1 Drowsy or 0 Alert and Calm  Need for changes to sedation or analgesia regimen? No   D: Delirium CAM-ICU: Negative   E: Early Mobility  Plan for early mobility? No   F: Family Engagement Plan for family engagement today? Yes       Antibiotic Review: Patient on appropriate coverage based on culture data.     Review of Invasive Devices:      Central access plan: Hemodynamic monitoring      Prophylaxis:  VTE VTE covered by:  apixaban, Per PEG Tube, 5 mg at 10/08/24 1823       Stress Ulcer  not ordered         24 Hour Events :   Patient seen and examined at bedside this morning.  He seems to be more responsive to voice this morning.  Is able to nod his head somewhat appropriately.  Continues to likely have free water deficit which we are more aggressively pursuing.  Subjective   Review of Systems: See HPI for Review of Systems    Objective :                   Vitals I/O      Most Recent Min/Max in 24hrs   Temp 98.3 °F (36.8 °C) Temp  Min: 97.9 °F (36.6 °C)  Max: 98.5 °F (36.9 °C)   Pulse 61 Pulse  Min: 42  Max: 83   Resp  15 Resp  Min: 13  Max: 34   BP 98/66 BP  Min: 76/51  Max: 166/96   O2 Sat 96 % SpO2  Min: 85 %  Max: 100 %      Intake/Output Summary (Last 24 hours) at 10/9/2024 0713  Last data filed at 10/9/2024 0625  Gross per 24 hour   Intake 1461.56 ml   Output 516 ml   Net 945.56 ml       Diet Enteral/Parenteral; Tube Feeding No Oral Diet; Jevity 1.5; Continuous; 70; 250; Water; Every 4 hours    Invasive Monitoring           Physical Exam   Physical Exam  Vitals and nursing note reviewed.   Skin:     General: Skin is warm and dry.   HENT:      Head: Normocephalic and atraumatic.   Neck:   no midline tenderness Cardiovascular:      Rate and Rhythm: Normal rate.   Musculoskeletal:         General: No deformity or signs of injury.      Right lower leg: No edema.      Left lower leg: No edema.   Abdominal: General: Bowel sounds are normal. There is no distension.      Palpations: Abdomen is soft.   Constitutional:       General: He is not in acute distress.     Appearance: He is well-developed and well-nourished. He is ill-appearing.   Pulmonary:      Effort: No respiratory distress.      Comments: Trach/PEG dependent  Neurological:      Mental Status: He is unresponsive.          Diagnostic Studies        Lab Results: I have reviewed the following results:     Medications:  Scheduled PRN   acetylcysteine, 3 mL, Q6H  apixaban, 5 mg, BID  cefTRIAXone, 2,000 mg, Q24H  chlorhexidine, 15 mL, Q12H ISAEL  levalbuterol, 1.25 mg, Q6H  polyethylene glycol, 17 g, Daily  senna-docusate sodium, 2 tablet, BID      acetaminophen, 650 mg, Q6H PRN  fentaNYL, 50 mcg, Q2H PRN       Continuous    dextrose, 75 mL/hr, Last Rate: 75 mL/hr (10/09/24 0620)  norepinephrine, 1-30 mcg/min, Last Rate: 1 mcg/min (10/09/24 0504)         Labs:   CBC    Recent Labs     10/08/24  0344 10/09/24  0421   WBC 8.40 13.16*   HGB 11.2* 12.7   HCT 37.6 43.2   * 188     BMP    Recent Labs     10/08/24  2332 10/09/24  0421   SODIUM 160* 158*   K 4.0 3.7   *  122*   CO2 33* 31   AGAP 6 5   BUN 7 6   CREATININE 0.80 0.74   CALCIUM 8.8 8.6       Coags    Recent Labs     10/08/24  0344   INR 1.46*   PTT 31        Additional Electrolytes  Recent Labs     10/07/24  2347 10/08/24  0344 10/09/24  0421   MG 2.4  --  2.6   PHOS 3.1  --  3.1   CAIONIZED  --  1.10* 1.20          Blood Gas    No recent results  No recent results   LFTs  Recent Labs     10/08/24  0344   ALT 11   AST 12*   ALKPHOS 72   ALB 2.7*   TBILI 0.79       Infectious  No recent results    Glucose  Recent Labs     10/08/24  1309 10/08/24  1825 10/08/24  2332 10/09/24  0421   GLUC 97 152* 109 102

## 2024-10-09 NOTE — ASSESSMENT & PLAN NOTE
-Noted with HR dropping as low as 30-40s  -Received atropine on 10/8 for episode of arrest/pause and on 10/9 for bradycardia  -Cardiology following; appreciate recommendations   -No recommendation for PPM at this time   -Suspected to be in the setting of vasovagal/agitation  -Telemetry  -Medical management per ICU and Cardiology

## 2024-10-09 NOTE — ASSESSMENT & PLAN NOTE
-Noted to be febrile, tachycardic, with elevated WBC/lactic acid on presentation  -Initial CXR: Complete opacification of the left hemithorax with mediastinal shift to the left indicating at least in part atelectasis.   -Initial labs: lactic acid 12.1, WBC 14.76, COVID/flu/RSV negative  -Blood cultures pending  -UA negative  -Sputum culture positive for Proteus mirabilis, Pseudomonas aeruginosa  -Currently on Zosyn  -Medical management per ICU

## 2024-10-09 NOTE — PROGRESS NOTES
Critical Care Attending Note; John Sutherland   Note Date: 10/09/24  Note Time: 11:27 AM    Patient: Hemanth Swanson  Age, : 39 y.o., 1985 MRN: [id number] Code Status: Level 1 - Full Code Patient Location: ICU 05/ICU 05   Hospital LOS:4 days  ]   Patient seen and examined, medical record reviewed, discussed with house staff and nursing staff.     HPI   CC: Hypernatremia  39M with a PMH Seminoma(Orcetomy/Chemo), HTN, HFpEF, DM, Cardiac Arrest(Hypoxic - ), Demyelinating Neuromuscular Disorder(Trach/Peg),   Who presents after removing peg tube found to have aspiration and hypernatremia.     Key Recent Events   10/5: Admitted  10/6: Bronch  10/8: Atropine Given X1 - Sinus Bradycardia, normotensive  10/9: Seizure like activity, Atropine   Main ICU Plans:       #Neuro  AMS/ Demyelinating Neuromuscular disorder - Baseline declining over time- Plan to correct metabolic derrangments to look for improvement, slight improvement   - EEG negative - Neurology consult for recurrent seizure like activity  - Transition  Fentanyl PRN     #Card  VT/Bradycardia - Recurrent Vagal? - Previously described due to mucous plugging and hypoxia.Possible neuro reflex vs mucous plugging. Patient having episodes of bearing down and apnea. Plan to optimize bowel regimen to prevent strain  - Cardiology Consult - Dopamine  - EKG   - K>4 Mg> 2    HFpEF   - Euvolemic     Septic Shock? - Component of vaso plegia  - Levophed - MAP > 65mmHg    #Pulm  Acute Respiratory Failure - Secondary to mucous plug/Pneumonia - Trach  - LTVV VC - 6-8cc/kg IDBW  - Wean FiO2 - Maintain Oxygen Sat >92%  - VAP Bundle  - HOB > 30o    - PEEP Titrate  - Trach care  - Xopenex/Chest PT/Mucomyst- CXR 10/9    Pulmonary Embolism   - Eliquis     #Renal  Hypernatremia - Goal 10-12 correction - 150  - Nephrology Consult  - FWF, D5W  - BMP q6hr    #GI  Bowel Regimen - Mirilax/Enema    #ID   Pnemonia - Likely Aspiration - Growing proteus, pseudomonas  - Zosyn   -  Follow up Infectious     #Endo  DM  - ISS      #DVT/GI ppx  Apixaban    #Lines/Tubes/Drains:   Invasive Devices       Central Venous Catheter Line  Duration             Port A Cath 03/14/23 Right Subclavian 575 days              Peripheral Intravenous Line  Duration             Peripheral IV 10/06/24 Left;Ventral (anterior) Forearm 2 days    Peripheral IV 10/08/24 Right;Ventral (anterior) Forearm 1 day              Drain  Duration             Gastrostomy/Enterostomy Percutaneous Interventional Gastrostomy 18 Fr.  days              Airway  Duration             Surgical Airway Distal;Cuffed;Other (Comment) 129 days                    #Nutrition:   Diet Enteral/Parenteral; Tube Feeding No Oral Diet; Jevity 1.5; Continuous; 70; 250; Water; Every 4 hours        #Code Status:   Level 1 - Full Code    #Dispo:   ICU        John Sutherland MD  Pulmonary, Critical Care    Critical care time, excluding procedures, teaching, family meetings, and excludes any prior time recorded by the AP/resident, 35 minutes. Upon my evaluation, this patient has a high probability of imminent or life-threatening deterioration due to above problems which required my direct attention, intervention, and personal management.   Impression/Active Problems:    HTN  HFpEF  DM   Neuromuscualr disorder   Tracheostomy   PEG   Hypernatremia   Aspiration   Vaso vagal    Ventricular Tachycardia   Bradycardia    Physical Exam:     Vital Signs:   Weight: 99 kg (218 lb 4.1 oz)  IBW: Ideal body weight: 86.8 kg (191 lb 5.7 oz)  Adjusted ideal body weight: 91.7 kg (202 lb 1.9 oz)  Temp:  [96.9 °F (36.1 °C)-98.5 °F (36.9 °C)] 96.9 °F (36.1 °C)  HR:  [42-83] 61  BP: ()/(50-99) 103/57  Resp:  [12-34] 16  SpO2:  [85 %-100 %] 97 %  O2 Device: Ventilator  FiO2 (%):  [40] 40  General: NAD  Neuro: Min response today  Heart: RRR  Lungs: Diminished Bases  Abdomen: Soft ND  Extremities: Min edema                Ventilator Settings:     Vent Mode:  AC/VC  FiO2 (%):  [40] 40    Results from last 7 days   Lab Units 10/05/24  2316   PO2 SYLVESTER mm Hg 45.3*     Radiologic Images Reviewed:   MRI 6/16  No acute intracranial pathology.  Stable mild nonspecific white matter changes likely due to chronic microangiopathy.    CXR   White Out Left   Input / Output:     Intake/Output Summary (Last 24 hours) at 10/9/2024 1127  Last data filed at 10/9/2024 1001  Gross per 24 hour   Intake 1490.72 ml   Output 614 ml   Net 876.72 ml            Infusions:  dextrose, 75 mL/hr, Last Rate: 75 mL/hr (10/09/24 0620)  norepinephrine, 1-30 mcg/min, Last Rate: 1 mcg/min (10/09/24 1120)      Scheduled Medications:  Current Facility-Administered Medications   Medication Dose Route Frequency Provider Last Rate    acetaminophen  650 mg Oral Q6H PRN Mohsen Moon MD      acetylcysteine  3 mL Nebulization Q6H Ne Low MD      apixaban  5 mg Per PEG Tube BID Mohsen Moon MD      cefTRIAXone  2,000 mg Intravenous Q24H Ne Low MD 2,000 mg (10/08/24 1538)    chlorhexidine  15 mL Mouth/Throat Q12H ISAEL Mohsen Moon MD      dextrose  75 mL/hr Intravenous Continuous Ne Low MD 75 mL/hr (10/09/24 0620)    fentaNYL  50 mcg Intravenous Q2H PRN AMBER Tucker      levalbuterol  1.25 mg Nebulization Q6H Silvio Alas MD      norepinephrine  1-30 mcg/min Intravenous Titrated Silivo Alas MD 1 mcg/min (10/09/24 1120)    polyethylene glycol  17 g Per PEG Tube Daily Ne Low MD      senna-docusate sodium  2 tablet Oral BID Ne oLw MD         PRN Medications:    acetaminophen    fentaNYL    Labs Reviewed:  Results from last 7 days   Lab Units 10/09/24  0421 10/08/24  0344 10/07/24  0422   WBC Thousand/uL 13.16* 8.40 10.55*   HEMOGLOBIN g/dL 12.7 11.2* 12.1   HEMATOCRIT % 43.2 37.6 40.1   PLATELETS Thousands/uL 188 146* 167      Results from last 7 days   Lab Units 10/09/24  1037  "10/09/24  0421 10/08/24  2332 10/08/24  0344 10/07/24  2347 10/07/24  2107 10/07/24  1749   SODIUM mmol/L 156* 158* 160*   < >  --    < > 159*   CO2 mmol/L 33* 31 33*   < >  --    < > 31   BUN mg/dL 6 6 7   < >  --    < > 6   CALCIUM mg/dL 8.4 8.6 8.8   < >  --    < > 8.9   MAGNESIUM mg/dL  --  2.6  --   --  2.4  --  2.4   PHOSPHORUS mg/dL  --  3.1  --   --  3.1  --  2.4*    < > = values in this interval not displayed.         Invalid input(s): \"ASTSGOT\", \"ALTSGPT\"LABRCNTIP@ ,alkphos:3,tbilirubin:3,dbilirubin:3)@  Results from last 7 days   Lab Units 10/08/24  0344 10/05/24  2020   INR  1.46* 1.63*           Invalid input(s): \"TROPT\", \"PBNP\"             I have personally seen and examined the patient on (10/09/24 between [id number]). I discussed the patient with the AP/resident including, but not limited to, verifying findings; reviewing labs and x-rays; discussing with consultants; developing the plan of care with the bedside nurse; and discussing treatment plan with patient or surrogate.  I have reviewed the note and assessment performed by the AP/resident and agree with the AP/resident’s documented findings and plan of care with the above additions/exceptions. Please see my comments for details and adjustments.                 "

## 2024-10-10 ENCOUNTER — APPOINTMENT (INPATIENT)
Dept: NEUROLOGY | Facility: HOSPITAL | Age: 39
DRG: 720 | End: 2024-10-10
Payer: COMMERCIAL

## 2024-10-10 LAB
ANION GAP SERPL CALCULATED.3IONS-SCNC: 4 MMOL/L (ref 4–13)
ANION GAP SERPL CALCULATED.3IONS-SCNC: 6 MMOL/L (ref 4–13)
ANION GAP SERPL CALCULATED.3IONS-SCNC: 7 MMOL/L (ref 4–13)
ANION GAP SERPL CALCULATED.3IONS-SCNC: 7 MMOL/L (ref 4–13)
BACTERIA UR QL AUTO: ABNORMAL /HPF
BASE EX.OXY STD BLDV CALC-SCNC: 70.5 % (ref 60–80)
BASE EX.OXY STD BLDV CALC-SCNC: 78.5 % (ref 60–80)
BASE EXCESS BLDV CALC-SCNC: -3.7 MMOL/L
BASE EXCESS BLDV CALC-SCNC: 7.2 MMOL/L
BILIRUB UR QL STRIP: NEGATIVE
BUN SERPL-MCNC: 6 MG/DL (ref 5–25)
BUN SERPL-MCNC: 6 MG/DL (ref 5–25)
BUN SERPL-MCNC: 7 MG/DL (ref 5–25)
BUN SERPL-MCNC: 7 MG/DL (ref 5–25)
CA-I BLD-SCNC: 1.12 MMOL/L (ref 1.12–1.32)
CALCIUM SERPL-MCNC: 8.2 MG/DL (ref 8.4–10.2)
CALCIUM SERPL-MCNC: 8.5 MG/DL (ref 8.4–10.2)
CALCIUM SERPL-MCNC: 8.8 MG/DL (ref 8.4–10.2)
CALCIUM SERPL-MCNC: 9 MG/DL (ref 8.4–10.2)
CHLORIDE SERPL-SCNC: 109 MMOL/L (ref 96–108)
CHLORIDE SERPL-SCNC: 110 MMOL/L (ref 96–108)
CHLORIDE SERPL-SCNC: 112 MMOL/L (ref 96–108)
CHLORIDE SERPL-SCNC: 113 MMOL/L (ref 96–108)
CLARITY UR: CLEAR
CO2 SERPL-SCNC: 31 MMOL/L (ref 21–32)
CO2 SERPL-SCNC: 31 MMOL/L (ref 21–32)
CO2 SERPL-SCNC: 33 MMOL/L (ref 21–32)
CO2 SERPL-SCNC: 34 MMOL/L (ref 21–32)
COLOR UR: YELLOW
CREAT SERPL-MCNC: 0.73 MG/DL (ref 0.6–1.3)
CREAT SERPL-MCNC: 0.74 MG/DL (ref 0.6–1.3)
CREAT SERPL-MCNC: 0.76 MG/DL (ref 0.6–1.3)
CREAT SERPL-MCNC: 0.84 MG/DL (ref 0.6–1.3)
ERYTHROCYTE [DISTWIDTH] IN BLOOD BY AUTOMATED COUNT: 18.7 % (ref 11.6–15.1)
GFR SERPL CREATININE-BSD FRML MDRD: 111 ML/MIN/1.73SQ M
GFR SERPL CREATININE-BSD FRML MDRD: 116 ML/MIN/1.73SQ M
GFR SERPL CREATININE-BSD FRML MDRD: 117 ML/MIN/1.73SQ M
GFR SERPL CREATININE-BSD FRML MDRD: 118 ML/MIN/1.73SQ M
GLUCOSE SERPL-MCNC: 146 MG/DL (ref 65–140)
GLUCOSE SERPL-MCNC: 150 MG/DL (ref 65–140)
GLUCOSE SERPL-MCNC: 164 MG/DL (ref 65–140)
GLUCOSE SERPL-MCNC: 93 MG/DL (ref 65–140)
GLUCOSE UR STRIP-MCNC: NEGATIVE MG/DL
HCO3 BLDV-SCNC: 26.7 MMOL/L (ref 24–30)
HCO3 BLDV-SCNC: 31.4 MMOL/L (ref 24–30)
HCT VFR BLD AUTO: 46.3 % (ref 36.5–49.3)
HGB BLD-MCNC: 14.4 G/DL (ref 12–17)
HGB UR QL STRIP.AUTO: NEGATIVE
HOROWITZ INDEX BLDA+IHG-RTO: 40 MM[HG]
KETONES UR STRIP-MCNC: ABNORMAL MG/DL
LACTATE SERPL-SCNC: 1.5 MMOL/L (ref 0.5–2)
LACTATE SERPL-SCNC: 2.3 MMOL/L (ref 0.5–2)
LEUKOCYTE ESTERASE UR QL STRIP: NEGATIVE
MAGNESIUM SERPL-MCNC: 2.5 MG/DL (ref 1.9–2.7)
MCH RBC QN AUTO: 31.1 PG (ref 26.8–34.3)
MCHC RBC AUTO-ENTMCNC: 31.1 G/DL (ref 31.4–37.4)
MCV RBC AUTO: 100 FL (ref 82–98)
NITRITE UR QL STRIP: NEGATIVE
NON-SQ EPI CELLS URNS QL MICRO: ABNORMAL /HPF
O2 CT BLDV-SCNC: 13.5 ML/DL
O2 CT BLDV-SCNC: 15.7 ML/DL
PCO2 BLDV: 43 MM HG (ref 42–50)
PCO2 BLDV: 75 MM HG (ref 42–50)
PEEP RESPIRATORY: 6 CM[H2O]
PH BLDV: 7.17 [PH] (ref 7.3–7.4)
PH BLDV: 7.48 [PH] (ref 7.3–7.4)
PH UR STRIP.AUTO: 6 [PH]
PHOSPHATE SERPL-MCNC: 3 MG/DL (ref 2.7–4.5)
PLATELET # BLD AUTO: 182 THOUSANDS/UL (ref 149–390)
PMV BLD AUTO: 10.1 FL (ref 8.9–12.7)
PO2 BLDV: 33.6 MM HG (ref 35–45)
PO2 BLDV: 55.5 MM HG (ref 35–45)
POTASSIUM SERPL-SCNC: 3.7 MMOL/L (ref 3.5–5.3)
POTASSIUM SERPL-SCNC: 3.9 MMOL/L (ref 3.5–5.3)
POTASSIUM SERPL-SCNC: 4 MMOL/L (ref 3.5–5.3)
POTASSIUM SERPL-SCNC: 4.9 MMOL/L (ref 3.5–5.3)
PROCALCITONIN SERPL-MCNC: 0.81 NG/ML
PROT UR STRIP-MCNC: ABNORMAL MG/DL
RBC # BLD AUTO: 4.63 MILLION/UL (ref 3.88–5.62)
RBC #/AREA URNS AUTO: ABNORMAL /HPF
SODIUM SERPL-SCNC: 146 MMOL/L (ref 135–147)
SODIUM SERPL-SCNC: 148 MMOL/L (ref 135–147)
SODIUM SERPL-SCNC: 150 MMOL/L (ref 135–147)
SODIUM SERPL-SCNC: 153 MMOL/L (ref 135–147)
SP GR UR STRIP.AUTO: 1.03 (ref 1–1.03)
UROBILINOGEN UR STRIP-ACNC: <2 MG/DL
VENT AC: 22
VENT- AC: AC
VT SETTING VENT: 500 ML
WBC # BLD AUTO: 16.43 THOUSAND/UL (ref 4.31–10.16)
WBC #/AREA URNS AUTO: ABNORMAL /HPF

## 2024-10-10 PROCEDURE — 99232 SBSQ HOSP IP/OBS MODERATE 35: CPT | Performed by: INTERNAL MEDICINE

## 2024-10-10 PROCEDURE — 94640 AIRWAY INHALATION TREATMENT: CPT

## 2024-10-10 PROCEDURE — 99232 SBSQ HOSP IP/OBS MODERATE 35: CPT | Performed by: STUDENT IN AN ORGANIZED HEALTH CARE EDUCATION/TRAINING PROGRAM

## 2024-10-10 PROCEDURE — 85027 COMPLETE CBC AUTOMATED: CPT

## 2024-10-10 PROCEDURE — 94760 N-INVAS EAR/PLS OXIMETRY 1: CPT

## 2024-10-10 PROCEDURE — 83735 ASSAY OF MAGNESIUM: CPT

## 2024-10-10 PROCEDURE — 83605 ASSAY OF LACTIC ACID: CPT | Performed by: NURSE PRACTITIONER

## 2024-10-10 PROCEDURE — 95715 VEEG EA 12-26HR INTMT MNTR: CPT

## 2024-10-10 PROCEDURE — 82805 BLOOD GASES W/O2 SATURATION: CPT

## 2024-10-10 PROCEDURE — 94150 VITAL CAPACITY TEST: CPT

## 2024-10-10 PROCEDURE — 84100 ASSAY OF PHOSPHORUS: CPT

## 2024-10-10 PROCEDURE — 99233 SBSQ HOSP IP/OBS HIGH 50: CPT | Performed by: PSYCHIATRY & NEUROLOGY

## 2024-10-10 PROCEDURE — 82330 ASSAY OF CALCIUM: CPT

## 2024-10-10 PROCEDURE — 94669 MECHANICAL CHEST WALL OSCILL: CPT

## 2024-10-10 PROCEDURE — 81001 URINALYSIS AUTO W/SCOPE: CPT

## 2024-10-10 PROCEDURE — 80048 BASIC METABOLIC PNL TOTAL CA: CPT | Performed by: INTERNAL MEDICINE

## 2024-10-10 PROCEDURE — 82805 BLOOD GASES W/O2 SATURATION: CPT | Performed by: NURSE PRACTITIONER

## 2024-10-10 PROCEDURE — 94003 VENT MGMT INPAT SUBQ DAY: CPT

## 2024-10-10 PROCEDURE — 84145 PROCALCITONIN (PCT): CPT

## 2024-10-10 PROCEDURE — NC001 PR NO CHARGE: Performed by: STUDENT IN AN ORGANIZED HEALTH CARE EDUCATION/TRAINING PROGRAM

## 2024-10-10 PROCEDURE — 95700 EEG CONT REC W/VID EEG TECH: CPT

## 2024-10-10 RX ORDER — POLYETHYLENE GLYCOL 3350 17 G/17G
17 POWDER, FOR SOLUTION ORAL DAILY PRN
Status: DISCONTINUED | OUTPATIENT
Start: 2024-10-10 | End: 2024-10-13

## 2024-10-10 RX ORDER — DEXTROSE MONOHYDRATE 50 MG/ML
100 INJECTION, SOLUTION INTRAVENOUS CONTINUOUS
Status: DISCONTINUED | OUTPATIENT
Start: 2024-10-10 | End: 2024-10-12

## 2024-10-10 RX ORDER — AMOXICILLIN 250 MG
1 CAPSULE ORAL 2 TIMES DAILY
Status: DISCONTINUED | OUTPATIENT
Start: 2024-10-11 | End: 2024-10-11

## 2024-10-10 RX ORDER — FLUDROCORTISONE ACETATE 0.1 MG/1
0.05 TABLET ORAL DAILY
Status: DISCONTINUED | OUTPATIENT
Start: 2024-10-10 | End: 2024-10-11

## 2024-10-10 RX ORDER — MIDAZOLAM HYDROCHLORIDE 2 MG/2ML
INJECTION, SOLUTION INTRAMUSCULAR; INTRAVENOUS
Status: COMPLETED
Start: 2024-10-10 | End: 2024-10-10

## 2024-10-10 RX ORDER — POTASSIUM CHLORIDE 20MEQ/15ML
40 LIQUID (ML) ORAL ONCE
Status: COMPLETED | OUTPATIENT
Start: 2024-10-10 | End: 2024-10-10

## 2024-10-10 RX ORDER — MIDAZOLAM HYDROCHLORIDE 2 MG/2ML
1 INJECTION, SOLUTION INTRAMUSCULAR; INTRAVENOUS ONCE
Status: COMPLETED | OUTPATIENT
Start: 2024-10-10 | End: 2024-10-10

## 2024-10-10 RX ADMIN — DOPAMINE HYDROCHLORIDE IN DEXTROSE 10 MCG/KG/MIN: 1.6 INJECTION, SOLUTION INTRAVENOUS at 02:03

## 2024-10-10 RX ADMIN — ACETYLCYSTEINE 600 MG: 200 SOLUTION ORAL; RESPIRATORY (INHALATION) at 07:28

## 2024-10-10 RX ADMIN — CHLORHEXIDINE GLUCONATE 15 ML: 1.2 RINSE ORAL at 20:59

## 2024-10-10 RX ADMIN — LEVALBUTEROL HYDROCHLORIDE 1.25 MG: 1.25 SOLUTION RESPIRATORY (INHALATION) at 07:28

## 2024-10-10 RX ADMIN — MIDAZOLAM HYDROCHLORIDE 1 MG: 1 INJECTION, SOLUTION INTRAMUSCULAR; INTRAVENOUS at 10:03

## 2024-10-10 RX ADMIN — APIXABAN 5 MG: 5 TABLET, FILM COATED ORAL at 08:25

## 2024-10-10 RX ADMIN — MIDAZOLAM HYDROCHLORIDE 1 MG: 2 INJECTION, SOLUTION INTRAMUSCULAR; INTRAVENOUS at 10:03

## 2024-10-10 RX ADMIN — SODIUM CHLORIDE, SODIUM LACTATE, POTASSIUM CHLORIDE, AND CALCIUM CHLORIDE 1000 ML: .6; .31; .03; .02 INJECTION, SOLUTION INTRAVENOUS at 10:00

## 2024-10-10 RX ADMIN — FENTANYL CITRATE 50 MCG: 50 INJECTION INTRAMUSCULAR; INTRAVENOUS at 08:25

## 2024-10-10 RX ADMIN — PIPERACILLIN SODIUM AND TAZOBACTAM SODIUM 4.5 G: 36; 4.5 INJECTION, POWDER, LYOPHILIZED, FOR SOLUTION INTRAVENOUS at 15:27

## 2024-10-10 RX ADMIN — CHLORHEXIDINE GLUCONATE 15 ML: 1.2 RINSE ORAL at 08:25

## 2024-10-10 RX ADMIN — FLUDROCORTISONE ACETATE 0.05 MG: 0.1 TABLET ORAL at 12:15

## 2024-10-10 RX ADMIN — APIXABAN 5 MG: 5 TABLET, FILM COATED ORAL at 17:25

## 2024-10-10 RX ADMIN — NOREPINEPHRINE BITARTRATE 15 MCG/MIN: 1 INJECTION INTRAVENOUS at 09:10

## 2024-10-10 RX ADMIN — POTASSIUM CHLORIDE 40 MEQ: 1.5 SOLUTION ORAL at 07:28

## 2024-10-10 RX ADMIN — LEVALBUTEROL HYDROCHLORIDE 1.25 MG: 1.25 SOLUTION RESPIRATORY (INHALATION) at 02:42

## 2024-10-10 RX ADMIN — POLYETHYLENE GLYCOL 3350 17 G: 17 POWDER, FOR SOLUTION ORAL at 08:27

## 2024-10-10 RX ADMIN — DEXTROSE 100 ML/HR: 5 SOLUTION INTRAVENOUS at 10:00

## 2024-10-10 RX ADMIN — LEVALBUTEROL HYDROCHLORIDE 1.25 MG: 1.25 SOLUTION RESPIRATORY (INHALATION) at 19:31

## 2024-10-10 RX ADMIN — HYDROCORTISONE SODIUM SUCCINATE 50 MG: 100 INJECTION, POWDER, FOR SOLUTION INTRAMUSCULAR; INTRAVENOUS at 11:06

## 2024-10-10 RX ADMIN — HYDROCORTISONE SODIUM SUCCINATE 50 MG: 100 INJECTION, POWDER, FOR SOLUTION INTRAMUSCULAR; INTRAVENOUS at 17:25

## 2024-10-10 RX ADMIN — DEXTROSE 150 ML/HR: 5 SOLUTION INTRAVENOUS at 01:28

## 2024-10-10 RX ADMIN — FENTANYL CITRATE 50 MCG: 50 INJECTION INTRAMUSCULAR; INTRAVENOUS at 20:57

## 2024-10-10 RX ADMIN — ACETYLCYSTEINE 600 MG: 200 SOLUTION ORAL; RESPIRATORY (INHALATION) at 02:42

## 2024-10-10 RX ADMIN — SENNOSIDES AND DOCUSATE SODIUM 2 TABLET: 8.6; 5 TABLET ORAL at 08:25

## 2024-10-10 RX ADMIN — ACETYLCYSTEINE 600 MG: 200 SOLUTION ORAL; RESPIRATORY (INHALATION) at 13:46

## 2024-10-10 RX ADMIN — ACETYLCYSTEINE 600 MG: 200 SOLUTION ORAL; RESPIRATORY (INHALATION) at 19:31

## 2024-10-10 RX ADMIN — PIPERACILLIN SODIUM AND TAZOBACTAM SODIUM 4.5 G: 36; 4.5 INJECTION, POWDER, LYOPHILIZED, FOR SOLUTION INTRAVENOUS at 07:28

## 2024-10-10 RX ADMIN — LEVALBUTEROL HYDROCHLORIDE 1.25 MG: 1.25 SOLUTION RESPIRATORY (INHALATION) at 13:46

## 2024-10-10 RX ADMIN — FENTANYL CITRATE 50 MCG: 50 INJECTION INTRAMUSCULAR; INTRAVENOUS at 05:28

## 2024-10-10 NOTE — ASSESSMENT & PLAN NOTE
[place] ED: febrile, tachycardic, with leukocytosis and a lactate of 12.  X-ray with left sided opacification, increased secretions from tracheostomy, initial pro killian negative. Received weight based fluids.    Transferred to [facility] for further management.  Repeat lab work upon arrival show persistent leukocytosis and hypernatremia.  Urine strep and Legionella negative  [facility] culture revealed Proteus and Pseudomonas    Plan:  Continue Zosyn  Continue vent support, frequent suctioning  Xopenex nebulizers

## 2024-10-10 NOTE — PROGRESS NOTES
Progress Note - Critical Care/ICU   Name: Hemanth Swanson 39 y.o. male I MRN: [id number]  Unit/Bed#: ICU 05 I Date of Admission: 10/5/2024   Date of Service: 10/10/2024 I Hospital Day: 5      Assessment & Plan  Hypernatremia  Hypernatremia with hyperchloremia, likely in the setting of dehydration.  Sodium level has been difficult to control.  Overnight sodium was 157 with recommendation from on-call nephrology to increase D5W to 150 mL/h and continued free water flushes 400 cc/h.  Upon recheck BMP in a.m., sodium level found to be 148 which is at goal.    Plan:  Given new episodes of vomiting, with decreased free water flushes to 200 cc.  Will also decrease maintenance fluids to 100 cc/h.  Frequent BMP every 4 hours  Bradycardia  Likely vagal response to coughing.  Spontaneously resolves without intervention.  Was initially started on dopamine with heart rate goal of 60.  Today had open heart rates up to 120s spontaneously dropping to 60s.  Dopamine has been off this morning.  Possibly related to autonomic insufficiency    Plan:  Start hydrocortisone 50 mg every 6  Start fludrocortisone 0.05 mg daily.  Will continue to monitor.  Sepsis ([facility])  [place] ED: febrile, tachycardic, with leukocytosis and a lactate of 12.  X-ray with left sided opacification, increased secretions from tracheostomy, initial pro killian negative. Received weight based fluids.    Transferred to [facility] for further management.  Repeat lab work upon arrival show persistent leukocytosis and hypernatremia.  Urine strep and Legionella negative  [facility] culture revealed Proteus and Pseudomonas    Plan:  Continue Zosyn  Continue vent support, frequent suctioning  Xopenex nebulizers  Chronic respiratory failure with hypoxia and hypercapnia ([facility])  Neuromuscular disease post chemotherapyS/P tracheostomy in 9/2023.  Vent dependent 24/7: current vent settings 14/500/6/60  Last bronchoscopy 6/1 with cultures growing serratia and pseudomonas.  Chest X-ray  10/5: Left sided opacification with possible mucus plugging.  CXR 10/7: Near complete opacification of the left hemithorax with leftward mediastinal shift, mildly improved from 10/5/2024, suggesting baseline underlying atelectasis and pleural effusion.   Chest x-ray 10/9 seems improved from prior.    Plan:  Continue vent support  Xopnex nebs TID  Continue mucomyst  Continue CPT  Seizure-like activity (HCC)  Repeat episode of seizure like activity this morning.  Video taken.  Will share with neurology this AM.  Possible repeat EEG.  Toxic metabolic encephalopathy  Likely related to hypoxia 2/2 atelectasis vs mucus plugging.  Continue to monitor for clinical response to Abx  Continue supportive care.  Disposition: Critical care    ICU Core Measures     Vented Patient  VAP Bundle  VAP bundle ordered     A: Assess, Prevent, and Manage Pain Has pain been assessed? Yes  Need for changes to pain regimen? No   B: Both Spontaneous Awakening Trials (SATs) and Spontaneous Breathing Trials (SBTs) Plan to perform spontaneous awakening trial today? Yes   Plan to perform spontaneous breathing trial today? N/A trach  Obvious barriers to extubation? Yes   C: Choice of Sedation RASS Goal: -1 Drowsy or 0 Alert and Calm  Need for changes to sedation or analgesia regimen? No   D: Delirium CAM-ICU: Negative   E: Early Mobility  Plan for early mobility? No   F: Family Engagement Plan for family engagement today? Yes       Antibiotic Review: Patient on appropriate coverage based on culture data.     Review of Invasive Devices:      Central access plan: Hemodynamic monitoring      Prophylaxis:  VTE VTE covered by:  apixaban, Per PEG Tube, 5 mg at 10/09/24 1732       Stress Ulcer  not ordered         24 Hour Events :     Subjective   Review of Systems: See HPI for Review of Systems    Objective :                   Vitals I/O      Most Recent Min/Max in 24hrs   Temp 98.4 °F (36.9 °C) Temp  Min: 96.8 °F (36 °C)  Max: 99 °F (37.2 °C)   Pulse 58  Pulse  Min: 47  Max: 96   Resp 14 Resp  Min: 10  Max: 23   /80 BP  Min: 77/50  Max: 183/108   O2 Sat 100 % SpO2  Min: 85 %  Max: 100 %      Intake/Output Summary (Last 24 hours) at 10/10/2024 0748  Last data filed at 10/10/2024 0633  Gross per 24 hour   Intake 3553.26 ml   Output 2698 ml   Net 855.26 ml       Diet Enteral/Parenteral; Tube Feeding No Oral Diet; Jevity 1.5; Continuous; 20; 400; Water; Every 4 hours    Invasive Monitoring           Physical Exam   Physical Exam  Vitals and nursing note reviewed.   Skin:     General: Skin is warm and dry.   HENT:      Head: Normocephalic and atraumatic.   Neck:   no midline tenderness Cardiovascular:      Rate and Rhythm: Normal rate.   Musculoskeletal:         General: No deformity or signs of injury.      Right lower leg: No edema.      Left lower leg: No edema.   Abdominal: General: Bowel sounds are normal. There is no distension.      Palpations: Abdomen is soft.   Constitutional:       General: He is not in acute distress.     Appearance: He is well-developed and well-nourished. He is ill-appearing.   Pulmonary:      Effort: No respiratory distress.      Comments: Trach/PEG dependent  Neurological:      Mental Status: He is unresponsive.          Diagnostic Studies        Lab Results: I have reviewed the following results:     Medications:  Scheduled PRN   acetylcysteine, 3 mL, Q6H  apixaban, 5 mg, BID  chlorhexidine, 15 mL, Q12H ISAEL  levalbuterol, 1.25 mg, Q6H  piperacillin-tazobactam, 4.5 g, Q8H  polyethylene glycol, 17 g, Daily  senna-docusate sodium, 2 tablet, BID      acetaminophen, 650 mg, Q6H PRN  bisacodyl, 10 mg, Daily PRN  fentaNYL, 50 mcg, Q2H PRN       Continuous    DOPamine in dextrose 5%, 5-20 mcg/kg/min, Last Rate: 10 mcg/kg/min (10/10/24 0203)  norepinephrine, 1-30 mcg/min, Last Rate: Stopped (10/09/24 1325)         Labs:   CBC    Recent Labs     10/09/24  0421 10/10/24  0416   WBC 13.16* 16.43*   HGB 12.7 14.4   HCT 43.2 46.3    182      BMP    Recent Labs     10/09/24  2344 10/10/24  0408   SODIUM 153* 148*   K 4.0 3.7   * 110*   CO2 33* 31   AGAP 7 7   BUN 6 6   CREATININE 0.76 0.74   CALCIUM 9.0 8.8       Coags    No recent results       Additional Electrolytes  Recent Labs     10/09/24  0421 10/10/24  0415   MG 2.6 2.5   PHOS 3.1 3.0   CAIONIZED 1.20 1.12          Blood Gas    No recent results  No recent results   LFTs  No recent results      Infectious  No recent results    Glucose  Recent Labs     10/09/24  1230 10/09/24  1739 10/09/24  2344 10/10/24  0408   GLUC 103 118 150* 164*

## 2024-10-10 NOTE — ASSESSMENT & PLAN NOTE
Likely vagal response to coughing.  Spontaneously resolves without intervention.  Was initially started on dopamine with heart rate goal of 60.  Today had open heart rates up to 120s spontaneously dropping to 60s.  Dopamine has been off this morning.  Possibly related to autonomic insufficiency    Plan:  Start hydrocortisone 50 mg every 6  Start fludrocortisone 0.05 mg daily.  Will continue to monitor.

## 2024-10-10 NOTE — RESPIRATORY THERAPY NOTE
10/10/24 0730   Vent Information   Vent type     Vent Mode AC/VC   $ Pulse Oximetry Spot Check Charge Completed   AC/VC Settings   Resp Rate (BPM) 14 BPM   Vt (mL) 500 mL   FIO2 (%) 40 %   PEEP (cmH2O) 6 cmH2O   Flow Pattern (LPM) 45 L/min   Trigger Sensitivity Flow (lpm) 3 %   Humidification Heater   Heater Temperature (Set) 98.6 °F (37 °C)   AC/VC Actuals   Resp Rate (BPM) 21 BPM   VT (mL) 501   MV 10.9   MAP (cmH2O) 13 cmH2O   Peak Pressure (cmH2O) 23 cmH2O   I/E Ratio (Obs) 1:1   Heater Temperature (Obs) 98.6 °F (37 °C)   AC/VC Alarms   High Peak Pressure (cmH2O) 40   High Resp Rate (BPM) 40 BPM   High MV (L/min) 16 L/min   Low MV (L/min) 3.5 L/min   Vt High (mL) 900 mL   Vt Low (mL) 200 mL   AC/VC Apnea Settings   Resp Rate (BPM) 14 BPM   VT (mL) 500 mL   FIO2 (%) 100 %   Apnea Time (s) 20 S   Apnea Flow (L/min) 45 L/min   Maintenance   Alarm (pink) cable attached Yes   Resuscitation bag with peep valve at bedside Yes   Water bag changed No   Circuit changed No   Daily Screen   Patient safety screen outcome: Failed   Not Ready for Weaning due to: Underline problem not resolved   IHI Ventilator Associated Pneumonia Bundle   Daily Awakening Trials Performed Yes   Head of Bed Elevated HOB 30   Surgical Airway Distal;Cuffed;Other (Comment)   Placement Date/Time: 06/01/24 1155   Tube Size: 6  Placed By: Physician  Placed by (Name): Dr. Ronquillo  Type: Tracheostomy  Style: (c) Distal;Cuffed;Other (Comment)   Status Cuff Inflated   Site Assessment Clean;Dry   Site Care Cleansed;Dried;Dressing applied   Inner Cannula Care Changed/new   Ties Assessment Intact;Secure;Changed   Surgical Airway Cuff Pressure (color) Green   Equipment at bedside BVM;Wall Suction setup;Additional complete same size trach tube;Additional complete one size smaller trach tube;Obturator;Sterile saline;Additional inner cannula

## 2024-10-10 NOTE — PROGRESS NOTES
Progress Note - Neurology   Name: Hemanth Swanson 39 y.o. male I MRN: [id number]  Unit/Bed#: ICU 05 I Date of Admission: 10/5/2024   Date of Service: 10/10/2024 I Hospital Day: 5    Assessment & Plan  Seizure-like activity (HCC)  40 y/o male with prior PE on Eliquis, concern for neuromuscular syndrome, metastatic L testicular seminoma, tracheostomy, prior cardiac arrest, G-tube placement, anxiety, depression, bipolar disorder, who presented initially to [facility] on 10/5 after pulling his G-tube out. In ED, he was noted to be tachycardic, hypoxic, febrile, with leukocytosis. He was found to have L sided opacification on CXR and was started on antibiotics. He was subsequently transferred to [facility] ICU for further monitoring and management. While in ICU, he has been having episodes of bradycardia and hypoxia, which seem to occur during coughing episodes or when he is agitated/anxious. He received atropine on 10/8 for an episode of pause/arrest. Overnight into 10/9, patient was noted to have seizure-like activity, described as rhythmic movements and eye rolling that responded to Ativan. No bradycardia/hypoxia reportedly occurred during this. Neurology consulted for further evaluation of seizure-like activity.    Patient had another episode of seizure-like activity earlier this morning. ICU team was able to capture on video- patient has his eyes open and has rhythmic jerking of his body that lasted several seconds before resolving spontaneously. He was noted to be back to baseline after the episode resolved. Unclear etiology at this time. Cannot fully rule out seizure. Workup as noted below.    Workup:  -10/9 CT head: No acute intracranial abnormality.    Plan:  -vEEG pending for further evaluation  -Seizure precautions  -Per attending neurologist, recommend no further Ativan at this time  -Hold off on AED at this time  -PT/OT as able  -Monitor neuro exam; notify with any changes  -Medical management and supportive  care per ICU. Correction of any metabolic or infectious disturbances.   -Will review case and treatment plan with attending neurologist, Brandon. Please see attending attestation for any further recommendations.  Sepsis (HCC)  -Noted to be febrile, tachycardic, with elevated WBC/lactic acid on presentation  -Initial CXR: Complete opacification of the left hemithorax with mediastinal shift to the left indicating at least in part atelectasis.   -Initial labs: lactic acid 12.1, WBC 14.76, COVID/flu/RSV negative  -Blood cultures pending  -UA negative  -Sputum culture positive for Proteus mirabilis, Pseudomonas aeruginosa  -Currently on Zosyn  -Medical management per ICU  Chronic respiratory failure with hypoxia and hypercapnia (HCC)  -Tracheostomy with vent dependence  -Episodes of hypoxia  -Medical management per ICU  Bradycardia  -Noted with HR dropping as low as 30-40s  -Received atropine on 10/8 for episode of arrest/pause and on 10/9 for bradycardia  -Cardiology following; appreciate recommendations   -No recommendation for PPM at this time   -Suspected to be in the setting of vasovagal/agitation  -Telemetry  -Medical management per ICU and Cardiology  Hypernatremia  -Noted on presentation  -Nephrology following  -Medical management per ICU and Nephrology    Recommendations for outpatient neurological follow up have yet to be determined.      Subjective   Patient seen and evaluated. He did not open his eyes on command and when examiner attempted to open his eyes manually, he resisted. He did follow commands to move his arms and legs. Discussed with ICU team, patient had another episode of seizure-like activity earlier this morning. ICU team was able to capture episode on video- patient is laying in bed with eyes open and having rhythmic jerking of his body. The episode lasted several seconds before resolving. Per report, staff attempted to call for patient and speak to him. After the episode, patient seemed to be back  to his baseline. Patient was not hypoxic or bradycardic during episode. vEEG is pending for further evaluation.    Review of Systems   Unable to perform ROS: Other         Objective :  Temp:  [96.8 °F (36 °C)-99 °F (37.2 °C)] 98.4 °F (36.9 °C)  HR:  [47-96] 58  BP: ()/() 142/80  Resp:  [10-23] 14  SpO2:  [85 %-100 %] 100 %  O2 Device: Ventilator    Physical Exam  Vitals and nursing note reviewed.   Constitutional:       General: He is not in acute distress.     Appearance: He is obese. He is not diaphoretic.      Comments: Soft wrist restraints in place. Did not open eyes to stimuli or on command.   HENT:      Head: Normocephalic.      Mouth/Throat:      Mouth: Mucous membranes are moist.   Eyes:      General: No scleral icterus.        Right eye: No discharge.         Left eye: No discharge.      Conjunctiva/sclera: Conjunctivae normal.   Cardiovascular:      Rate and Rhythm: Normal rate.   Pulmonary:      Effort: Pulmonary effort is normal. No respiratory distress.      Comments: Trach in place  Skin:     General: Skin is warm and dry.      Coloration: Skin is not jaundiced or pale.       Neurological Exam  Mental Status    Did not open eyes to stimuli or on command but did follow some simple commands..    Cranial Nerves  Did not open eyes on command and when examiner attempted to open patient's eyes, patient resisted with his eyelids  Able to stick tongue out on command and appears midline  No obvious facial asymmetry.    Motor  Normal muscle bulk throughout.  Moves all four extremities spontaneously  Bilateral hand  5-/5  Able to raise bilateral LE antigravity on command.        Lab Results: I have reviewed the following results:  Imaging Results Review: I reviewed radiology reports from this admission including: chest xray and CT head.  Other Study Results Review: No additional pertinent studies reviewed.    VTE Pharmacologic Prophylaxis: VTE covered by:  apixaban, Per PEG Tube, 5 mg at 10/10/24  0825     and Sequential compression device ([place])

## 2024-10-10 NOTE — ASSESSMENT & PLAN NOTE
Repeat episode of seizure like activity this morning.  Video taken.  Will share with neurology this AM.  Possible repeat EEG.

## 2024-10-10 NOTE — ASSESSMENT & PLAN NOTE
Neuromuscular disease post chemotherapyS/P tracheostomy in 9/2023.  Vent dependent 24/7: current vent settings 14/500/6/60  Last bronchoscopy 6/1 with cultures growing serratia and pseudomonas.  Chest X-ray 10/5: Left sided opacification with possible mucus plugging.  CXR 10/7: Near complete opacification of the left hemithorax with leftward mediastinal shift, mildly improved from 10/5/2024, suggesting baseline underlying atelectasis and pleural effusion.   Chest x-ray 10/9 seems improved from prior.    Plan:  Continue vent support  Xopnex nebs TID  Continue mucomyst  Continue CPT

## 2024-10-10 NOTE — PROGRESS NOTES
Critical Care Attending Note; John Sutherland   Note Date: 10/10/24  Note Time: 10:27 AM    Patient: Hemanth Swanson  Age, : 39 y.o., 1985 MRN: [id number] Code Status: Level 1 - Full Code Patient Location: ICU 05/ICU 05   Hospital LOS:5 days  ]   Patient seen and examined, medical record reviewed, discussed with house staff and nursing staff.     HPI   CC: Hypernatremia  39M with a PMH Seminoma(Orcetomy/Chemo), HTN, HFpEF, DM, Cardiac Arrest(Hypoxic - ), Demyelinating Neuromuscular Disorder(Trach/Peg),   Who presents after removing peg tube found to have aspiration and hypernatremia.     Key Recent Events   10/5: Admitted  10/6: Bronch  10/8: Atropine Given X1 - Sinus Bradycardia, normotensive  10/9: Seizure like activity, Atropine   Main ICU Plans:       #Neuro  AMS/ Demyelinating Neuromuscular disorder - Baseline declining over time- Plan to correct metabolic derrangments to look for improvement, slight improvement. Recurrent episodes of Vagal/Shaking  - Neurology Consulted - Appreciate recs - EEG Continuous   - Transition  Fentanyl PRN     #Card  VT/Bradycardia - Recurrent Vagal? - Previously described due to mucous plugging and hypoxia.Possible neuro reflex vs mucous plugging. Patient having episodes of bearing down and apnea. Plan to optimize bowel regimen to prevent strain  - Cardiology Consult -Appreciate recs  - K>4 Mg> 2    HFpEF   - Euvolemic      Hypotension - Component of vaso plegia vs autonomic insufficiency  - Levophed - MAP > 65mmHg  - Trial Stress dose steroids - Hydrocortisone/Florinef    #Pulm  Acute Respiratory Failure - Secondary to mucous plug/Pneumonia - Trach  - LTVV VC - 6-8cc/kg IDBW  - Wean FiO2 - Maintain Oxygen Sat >92%  - VAP Bundle  - HOB > 30o    - PEEP Titrate  - Trach care  - Xopenex/Chest PT/Mucomyst- CXR 10/9    Pulmonary Embolism   - Eliquis     #Renal  Hypernatremia - Resolving   - Nephrology Consult - Appreciate recs  - Decrease FWF, D5W  - BMP  q6hr    #GI  Bowel Regimen - Mirilax/Enema    Emesis  - Decrease FWF, TF Trickle    #ID   Pnemonia - Likely Aspiration - Growing proteus, pseudomonas  - Zosyn 7 days      #Endo  DM  - ISS      #DVT/GI ppx  Apixaban    #Lines/Tubes/Drains:   Invasive Devices       Central Venous Catheter Line  Duration             Port A Cath 03/14/23 Right Subclavian 576 days              Peripheral Intravenous Line  Duration             Peripheral IV 10/08/24 Right;Ventral (anterior) Forearm 2 days    Peripheral IV 10/10/24 Left;Ventral (anterior) Forearm <1 day              Drain  Duration             Gastrostomy/Enterostomy Percutaneous Interventional Gastrostomy 18 Fr.  days    External Urinary Catheter <1 day              Airway  Duration             Surgical Airway Distal;Cuffed;Other (Comment) 130 days                    #Nutrition:   Diet Enteral/Parenteral; Tube Feeding No Oral Diet; Jevity 1.5; Continuous; 70; 400; Water; Every 4 hours        #Code Status:   Level 1 - Full Code    #Dispo:   ICU        John Sutherland MD  Pulmonary, Critical Care    Critical care time, excluding procedures, teaching, family meetings, and excludes any prior time recorded by the AP/resident, 35 minutes. Upon my evaluation, this patient has a high probability of imminent or life-threatening deterioration due to above problems which required my direct attention, intervention, and personal management.   Impression/Active Problems:    HTN  HFpEF  DM   Neuromuscualr disorder   Tracheostomy   PEG   Hypernatremia   Aspiration   Vaso vagal    Ventricular Tachycardia   Bradycardia    Physical Exam:     Vital Signs:   Weight: 96.7 kg (213 lb 3 oz)  IBW: Ideal body weight: 86.8 kg (191 lb 5.7 oz)  Adjusted ideal body weight: 90.8 kg (200 lb 1.4 oz)  Temp:  [96.8 °F (36 °C)-99 °F (37.2 °C)] 98.4 °F (36.9 °C)  HR:  [] 64  BP: ()/() 104/66  Resp:  [10-23] 14  SpO2:  [70 %-100 %] 97 %  O2 Device: Ventilator  Physical Exam:  Unchanged  General: NAD  Neuro: Min response today  Heart: RRR  Lungs: Diminished Bases  Abdomen: Soft ND  Extremities: Min edema                Ventilator Settings:     Vent Mode: AC/VC    Results from last 7 days   Lab Units 10/10/24  0928 10/05/24  2316   PO2 SYLVESTER mm Hg 55.5* 45.3*     Radiologic Images Reviewed:   MRI 6/16  No acute intracranial pathology.  Stable mild nonspecific white matter changes likely due to chronic microangiopathy.    CXR   White Out Left     CT head  No acute intracranial abnormality. s  Input / Output:     Intake/Output Summary (Last 24 hours) at 10/10/2024 1027  Last data filed at 10/10/2024 0800  Gross per 24 hour   Intake 3509.24 ml   Output 2800 ml   Net 709.24 ml            Infusions:  dextrose, 100 mL/hr, Last Rate: 100 mL/hr (10/10/24 1000)  DOPamine in dextrose 5%, 5-20 mcg/kg/min, Last Rate: Stopped (10/10/24 0845)  norepinephrine, 1-30 mcg/min, Last Rate: 5 mcg/min (10/10/24 1002)      Scheduled Medications:  Current Facility-Administered Medications   Medication Dose Route Frequency Provider Last Rate    acetaminophen  650 mg Oral Q6H PRN Mohsen Moon MD      acetylcysteine  3 mL Nebulization Q6H Ne Low MD      apixaban  5 mg Per PEG Tube BID Mohsen Moon MD      bisacodyl  10 mg Rectal Daily PRN Ne Low MD      chlorhexidine  15 mL Mouth/Throat Q12H [facility] Mohsen Moon MD      dextrose  100 mL/hr Intravenous Continuous AMBER Tucker 100 mL/hr (10/10/24 1000)    DOPamine in dextrose 5%  5-20 mcg/kg/min Intravenous Titrated Bhupinder Angela MD Stopped (10/10/24 0845)    fentaNYL  50 mcg Intravenous Q2H PRN AMBER Tucker      lactated ringers  1,000 mL Intravenous Once AMBER Tucker 1,000 mL (10/10/24 1000)    levalbuterol  1.25 mg Nebulization Q6H Silvio Alas MD      norepinephrine  1-30 mcg/min Intravenous Titrated Silvio Alas MD 5 mcg/min (10/10/24 1002)     "piperacillin-tazobactam  4.5 g Intravenous Q8H Ne Low MD 4.5 g (10/10/24 0728)    polyethylene glycol  17 g Per PEG Tube Daily Ne Low MD      senna-docusate sodium  2 tablet Oral BID Ne Low MD         PRN Medications:    acetaminophen    [COMPLETED] bisacodyl **FOLLOWED BY** bisacodyl    fentaNYL    Labs Reviewed:  Results from last 7 days   Lab Units 10/10/24  0416 10/09/24  0421 10/08/24  0344   WBC Thousand/uL 16.43* 13.16* 8.40   HEMOGLOBIN g/dL 14.4 12.7 11.2*   HEMATOCRIT % 46.3 43.2 37.6   PLATELETS Thousands/uL 182 188 146*      Results from last 7 days   Lab Units 10/10/24  0415 10/10/24  0408 10/09/24  2344 10/09/24  1739 10/09/24  1034 10/09/24  0421 10/08/24  0344 10/07/24  2347   SODIUM mmol/L  --  148* 153* 157*   < > 158*   < >  --    CO2 mmol/L  --  31 33* 34*   < > 31   < >  --    BUN mg/dL  --  6 6 6   < > 6   < >  --    CALCIUM mg/dL  --  8.8 9.0 8.8   < > 8.6   < >  --    MAGNESIUM mg/dL 2.5  --   --   --   --  2.6  --  2.4   PHOSPHORUS mg/dL 3.0  --   --   --   --  3.1  --  3.1    < > = values in this interval not displayed.         Invalid input(s): \"ASTSGOT\", \"ALTSGPT\"LABRCNTIP@ ,alkphos:3,tbilirubin:3,dbilirubin:3)@  Results from last 7 days   Lab Units 10/08/24  0344 10/05/24  2020   INR  1.46* 1.63*           Invalid input(s): \"TROPT\", \"PBNP\"             I have personally seen and examined the patient on (10/10/24 between 1365-0311). I discussed the patient with the AP/resident including, but not limited to, verifying findings; reviewing labs and x-rays; discussing with consultants; developing the plan of care with the bedside nurse; and discussing treatment plan with patient or surrogate.  I have reviewed the note and assessment performed by the AP/resident and agree with the AP/resident’s documented findings and plan of care with the above additions/exceptions. Please see my comments for details and adjustments.                 "

## 2024-10-10 NOTE — QUICK NOTE
Renal on call note  Sodium 157- recommended increasing D5w to 150 ml/hr. Continue free water flushes.

## 2024-10-10 NOTE — ASSESSMENT & PLAN NOTE
Hypernatremia with hyperchloremia, likely in the setting of dehydration.  Sodium level has been difficult to control.  Overnight sodium was 157 with recommendation from on-call nephrology to increase D5W to 150 mL/h and continued free water flushes 400 cc/h.  Upon recheck BMP in a.m., sodium level found to be 148 which is at goal.    Plan:  Given new episodes of vomiting, with decreased free water flushes to 200 cc.  Will also decrease maintenance fluids to 100 cc/h.  Frequent BMP every 4 hours

## 2024-10-10 NOTE — PROGRESS NOTES
NEPHROLOGY HOSPITAL PROGRESS NOTE   Hemanth Swanson 39 y.o. male MRN: [id number]  Unit/Bed#: ICU 05 Encounter: [id number]  Reason for Consult: Hypernatremia    ASSESSMENT and PLAN:  Hemanth Swanson is a 39 y.o. male who was admitted to [facility] after presenting with dislodged PEG tube and left-sided consolidation. A renal consultation is requested for assistance in the management of hypernatremia.       1.  Hypernatremia.  This is due to lack of free water intake.  Urine osmolality of 910 not suggestive of DI and suggests dehydration.  Serum sodium level on admission 175 at 7:34 PM on 10/05/2024.  Treated with D5W on 10/06 and 10/07 with subsequent discontinuation on 10/07 due to concern for overcorrection.  On 10/08 for sodium level of 159, he was started on free water flushes 250 cc every 4 hours via PEG tube on 10/08 with no improvement in serum sodium level.  On 10/09 for sodium level of 158, he was started on free water flushes 400 cc every 4 hours and D5W which was increased later in the day to 150 cc/h.  Serum sodium level this morning 148.  Free water deficit to bring serum sodium level down to 140 around 5 L accounting for urinary losses and insensible losses.  Continue free water flushes at 400 cc every 4 hours and resume D5W at 100 cc/h.  Trend BMP every 6 hours.  Goal serum sodium by tomorrow morning within normal range.    2.  At risk for hypokalemia.  He is intermittently receiving D5W which can induce hypokalemia.  Continue to monitor potassium level and supplement as needed.     3.  Sepsis.  Currently being treated for pneumonia.  Antibiotics per ICU team.     4.  Chronic respiratory failure with hypoxia and hypercapnia.  Ventilator support per ICU.    Discussed with ICU team.  After discussion, we agreed that patient continues to have large free water deficit and to continue free water flushes at current rate and to start D5W at 100 cc/h.    SUBJECTIVE / 24H INTERVAL  HISTORY:  Urine output recorded as 2698 cc.  Patient is currently on a ventilator.  He is not responsive.    OBJECTIVE:  Current Weight: Weight - Scale: 96.7 kg (213 lb 3 oz)  Vitals:    10/10/24 0545 10/10/24 0600 10/10/24 0615 10/10/24 0723   BP:  111/66  142/80   Pulse: 96 75 58    Resp:  14     Temp:    98.4 °F (36.9 °C)   TempSrc:    Axillary   SpO2:  100%     Weight:       Height:           Intake/Output Summary (Last 24 hours) at 10/10/2024 0853  Last data filed at 10/10/2024 0800  Gross per 24 hour   Intake 3889.06 ml   Output 2898 ml   Net 991.06 ml     Review of Systems   Unable to perform ROS: Mental status change     Physical Exam  Vitals and nursing note reviewed.   Constitutional:       Appearance: He is well-developed. He is ill-appearing.   HENT:      Head: Normocephalic and atraumatic.   Eyes:      Conjunctiva/sclera: Conjunctivae normal.   Cardiovascular:      Rate and Rhythm: Normal rate and regular rhythm.      Pulses: Normal pulses.      Heart sounds: Normal heart sounds. No murmur heard.  Pulmonary:      Effort: Pulmonary effort is normal.      Breath sounds: Normal breath sounds.   Abdominal:      Palpations: Abdomen is soft.      Comments: PEG tube present   Musculoskeletal:         General: No swelling.      Cervical back: Neck supple.      Right lower leg: No edema.      Left lower leg: No edema.   Skin:     General: Skin is warm and dry.      Capillary Refill: Capillary refill takes less than 2 seconds.   Neurological:      Comments: On ventilator         Medications:    Current Facility-Administered Medications:     acetaminophen (TYLENOL) oral suspension 650 mg, 650 mg, Oral, Q6H PRN, Mohsen Moon MD    acetylcysteine (MUCOMYST) 200 mg/mL inhalation solution 600 mg, 3 mL, Nebulization, Q6H, Ne Low MD, 600 mg at 10/10/24 0728    apixaban (ELIQUIS) tablet 5 mg, 5 mg, Per PEG Tube, BID, Mohsen Moon MD, 5 mg at 10/10/24 0825    [COMPLETED] bisacodyl (DULCOLAX) rectal  suppository 10 mg, 10 mg, Rectal, Once, 10 mg at 10/09/24 1153 **FOLLOWED BY** bisacodyl (DULCOLAX) rectal suppository 10 mg, 10 mg, Rectal, Daily PRN, Ne Low MD    chlorhexidine (PERIDEX) 0.12 % oral rinse 15 mL, 15 mL, Mouth/Throat, Q12H ISAEL, Mohsen Moon MD, 15 mL at 10/10/24 0825    dextrose 5 % infusion, 100 mL/hr, Intravenous, Continuous, AMBER Tucker    DOPamine (INTROPIN) 400 mg in 250 mL infusion (premix), 5-20 mcg/kg/min, Intravenous, Titrated, Bhupinder Angela MD, Last Rate: 37.1 mL/hr at 10/10/24 0203, 10 mcg/kg/min at 10/10/24 0203    fentaNYL injection 50 mcg, 50 mcg, Intravenous, Q2H PRN, AMBER Tucker, 50 mcg at 10/10/24 0825    levalbuterol (XOPENEX) inhalation solution 1.25 mg, 1.25 mg, Nebulization, Q6H, Silvio Alas MD, 1.25 mg at 10/10/24 0728    NOREPINEPHRINE 4 MG  ML NSS (CMPD ORDER) infusion, 1-30 mcg/min, Intravenous, Titrated, Silvio Alas MD, Held at 10/09/24 1325    [COMPLETED] piperacillin-tazobactam (ZOSYN) 4.5 g in sodium chloride 0.9 % 100 mL IV LOADING DOSE, 4.5 g, Intravenous, Once, Last Rate: 200 mL/hr at 10/09/24 1200, 4.5 g at 10/09/24 1200 **FOLLOWED BY** piperacillin-tazobactam (ZOSYN) 4.5 g in sodium chloride 0.9 % 100 mL IVPB (EXTENDED INFUSION), 4.5 g, Intravenous, Q8H, Ne Low MD, Last Rate: 25 mL/hr at 10/10/24 0728, 4.5 g at 10/10/24 0728    polyethylene glycol (MIRALAX) packet 17 g, 17 g, Per PEG Tube, Daily, Ne Low MD, 17 g at 10/10/24 0827    senna-docusate sodium (SENOKOT S) 8.6-50 mg per tablet 2 tablet, 2 tablet, Oral, BID, Ne Low MD, 2 tablet at 10/10/24 0825    Laboratory Results:  Results from last 7 days   Lab Units 10/10/24  0416 10/10/24  0415 10/10/24  0408 10/09/24  2344 10/09/24  1739 10/09/24  1230 10/09/24  1034 10/09/24  0421 10/08/24  2332 10/08/24  0746 10/08/24  0344 10/07/24  2347 10/07/24  2107 10/07/24  1749  "10/07/24  1435 10/07/24  0958 10/07/24  0422 10/06/24  1616 10/06/24  1235 10/06/24  0810 10/06/24  0425 10/05/24  2312 10/05/24  2311 10/05/24  1934   WBC Thousand/uL 16.43*  --   --   --   --   --   --  13.16*  --   --  8.40  --   --   --   --   --  10.55*  --   --   --  15.56*  --  15.66* 14.76*   HEMOGLOBIN g/dL 14.4  --   --   --   --   --   --  12.7  --   --  11.2*  --   --   --   --   --  12.1  --   --   --  14.3  --  14.4 18.2*   HEMATOCRIT % 46.3  --   --   --   --   --   --  43.2  --   --  37.6  --   --   --   --   --  40.1  --   --   --  48.2  --  48.5 63.2*   PLATELETS Thousands/uL 182  --   --   --   --   --   --  188  --   --  146*  --   --   --   --   --  167  --   --   --  177  --  178 228   POTASSIUM mmol/L  --   --  3.7 4.0 4.1 4.6 3.1* 3.7 4.0   < > 2.9*  --    < > 3.9 4.0   < > 3.4*   < > 3.7   < > 3.7   < >  --  3.3*   CHLORIDE mmol/L  --   --  110* 113* 118* 118* 119* 122* 121*   < > 124*  --    < > 121* 123*   < > 120*   < > 121*   < > 126*   < >  --  116*   CO2 mmol/L  --   --  31 33* 34* 34* 33* 31 33*   < > 28  --    < > 31 30   < > 30   < > 34*   < > 35*   < >  --  38*   BUN mg/dL  --   --  6 6 6 7 6 6 7   < > 5  --    < > 6 6   < > 6   < > 8   < > 9   < >  --  13   CREATININE mg/dL  --   --  0.74 0.76 0.76 0.79 0.72 0.74 0.80   < > 0.61  --    < > 0.77 0.74   < > 0.58*   < > 0.73   < > 0.82   < >  --  1.10   CALCIUM mg/dL  --   --  8.8 9.0 8.8 8.7 8.4 8.6 8.8   < > 7.5*  --    < > 8.9 8.6   < > 8.3*   < > 8.2*   < > 8.2*   < >  --  10.2   MAGNESIUM mg/dL  --  2.5  --   --   --   --   --  2.6  --   --   --  2.4  --  2.4 2.3  --  2.2  --  2.3  --  2.5   < >  --   --    PHOSPHORUS mg/dL  --  3.0  --   --   --   --   --  3.1  --   --   --  3.1  --  2.4* 1.8*  --  1.7*  --  2.9  --  2.3*   < >  --   --     < > = values in this interval not displayed.       Portions of the record may have been created with voice recognition software. Occasional wrong word or \"sound a like\" substitutions may " have occurred due to the inherent limitations of voice recognition software. Read the chart carefully and recognize, using context, where substitutions have occurred. If you have any questions, please contact the dictating provider.

## 2024-10-10 NOTE — PROGRESS NOTES
General Cardiology   Progress Note -  [facility] One   Hemanth Bishopgwendolyn 39 y.o. male MRN: [id number]    Unit/Bed#: ICU 05 Encounter: [id number]    Assessment  1.  Bradycardia  -Baseline resting heart rates per prior telemetry/ECG review appear to be in the 50s-60s.  -Episodes of marked bradycardia/sinus pauses occurring predominately w/vagal maneuvers (bearing down, coughing/mucus plugging) w/hypoxia at times.   -Telemetry review 10/9-10/10 - NSR - ST w/rates variable in the 70's to 120's. HR currently in the 120s. No high grade AVB or pauses noted.   -Telemetry review  10/8-10/9-predominant sinus bradycardia/NSR with heart rates in the 50s to 60s.  Heart rates down into the upper 30s to 40s overnight while sleeping.  No high grade AVB or pauses noted.   -Telemetry review 10/7-10/8; 10-second sinus arrest with no escape complexes --Per RN staff no clear precipitating events prior to this (not visibly agitated, no coughing), RN was unclear if there was hypoxia during this event.   -Pressor/inotrope requirements: IV dopamine 10 mcg/kg/min.   2. Sepsis  3. PNA  -Management per the CC service.  -Procalcitonin 0.14--0.08  -Afebrile currently, leukocytosis improving since admission.  -On IV antibiotics.  3. Acute on chronic respiratory failure, ventilator dependent.  -Chronic respiratory failure - secondary to neuromuscular disease.  Acute respiratory failure secondary to mucous plugging/PNA).   -Management per the CC service.  4. Chronic HFpEF  -Appears euvolemic on exam.  -BNP level 11.  -TTE 6/11/2024; LVEF 65%, wall motion normal, RV dilated/RV systolic function is normal, LA/RA normal in size, no significant valvular disease.  -Outpatient diuretic regimen; none.  -Inpatient diuretic regimen; none.  5. Testicular cancer  -S/p right orchiectomy 12/2022  -Received cisplatin/etoposide - currently on HOLD  6. Obesity; BMI 26.5     Plan  -Suspect the primary driving factor for his transient episodes of bradycardia / pauses is   vasovagal mediated. At times appears generally uncomfortable/anxious/mildly agitated. He also has had issues w/cough/mucus plugging in which his face turns red, becomes diaphoretic, and hypoxic.   -At this time there is no acute indication for a PPM.   -Continue to optimize underlying lung/infectious processes (PNA/mucus plugging) and correction of electrolytes disturbances. Appears to be an anxiety/agitation component to this as well; would limit sedation and try low dose anti anxiolytics if needed. Try to avoid any medical therapies that can contribute to bradycardia. Can utilize atropine if brooke and becomes hemodynamically unstable or utilize IV dopamine for situations of sustained / marked bradycardia. He is currently on IV dopamine 10 mcg/kg/min w/HRs in the 120's and mildly hypertensive. Wean down IV dopamine - as long as BP is stable even if mildly bradycardic, this would be acceptable.   -Sepsis/PNA management per the CC service.  -Needs aggressive pulmonary hygiene.  -Monitor electrolytes closely.  Replete to maintain K+ level of 4.0/magnesium level at 2.0.  -Continue to monitor closely on telemetry.    Subjective  Review of Systems   Unable to perform ROS: Patient nonverbal       Objective:   Physical Exam  Vitals and nursing note reviewed.   Constitutional:       General: He is not in acute distress.     Appearance: He is not diaphoretic.   HENT:      Head: Normocephalic and atraumatic.   Eyes:      General: No scleral icterus.  Cardiovascular:      Rate and Rhythm: Normal rate and regular rhythm.      Pulses: Normal pulses.      Heart sounds: Normal heart sounds.   Abdominal:      Palpations: Abdomen is soft.      Tenderness: There is no abdominal tenderness.   Musculoskeletal:      Right lower leg: No edema.      Left lower leg: No edema.   Skin:     General: Skin is warm and dry.   Neurological:      Mental Status: He is alert.      Comments: Unable to fully assess.      Psychiatric:      Comments:  "Anxious/mildly agitated.        Vitals: Blood pressure 142/80, pulse 58, temperature 98.4 °F (36.9 °C), temperature source Axillary, resp. rate 14, height 6' 4\" (1.93 m), weight 96.7 kg (213 lb 3 oz), SpO2 100%.,     Body mass index is 25.95 kg/m².,   Systolic (24hrs), Av , Min:77 , Max:183     Diastolic (24hrs), Av, Min:42, Max:108      Intake/Output Summary (Last 24 hours) at 10/10/2024 0848  Last data filed at 10/10/2024 0800  Gross per 24 hour   Intake 3727.06 ml   Output 2698 ml   Net 1029.06 ml     Weight (last 2 days)       Date/Time Weight    10/10/24 0212 96.7 (213.19)    10/09/24 0245 99 (218.26)    10/08/24 0600 99 (218.26)            LABORATORY RESULTS      CBC with diff:   Results from last 7 days   Lab Units 10/10/24  0416 10/09/24  0421 10/08/24  0344 10/07/24  0422 10/06/24  0425 10/05/24  2311 10/05/24  1934   WBC Thousand/uL 16.43* 13.16* 8.40 10.55* 15.56* 15.66* 14.76*   HEMOGLOBIN g/dL 14.4 12.7 11.2* 12.1 14.3 14.4 18.2*   HEMATOCRIT % 46.3 43.2 37.6 40.1 48.2 48.5 63.2*   MCV fL 100* 103* 103* 101* 103* 103* 107*   PLATELETS Thousands/uL 182 188 146* 167 177 178 228   RBC Million/uL 4.63 4.21 3.64* 3.96 4.66 4.72 5.91*   MCH pg 31.1 30.2 30.8 30.6 30.7 30.5 30.8   MCHC g/dL 31.1* 29.4* 29.8* 30.2* 29.7* 29.7* 28.8*   RDW % 18.7* 18.7* 18.3* 18.3* 18.8* 18.6* 19.1*   MPV fL 10.1 10.9 10.9 10.4 10.2 10.6 10.4   NRBC AUTO /100 WBCs  --  0 0  --  0 0 0       CMP:  Results from last 7 days   Lab Units 10/10/24  0408 10/09/24  2344 10/09/24  1739 10/09/24  1230 10/09/24  1034 10/09/24  0421 10/08/24  2332 10/08/24  0746 10/08/24  0344 10/06/24  0148 10/05/24  2312 10/05/24  1934   POTASSIUM mmol/L 3.7 4.0 4.1 4.6 3.1* 3.7 4.0   < > 2.9*   < > 3.4* 3.3*   CHLORIDE mmol/L 110* 113* 118* 118* 119* 122* 121*   < > 124*   < > 125* 116*   CO2 mmol/L 31 33* 34* 34* 33* 31 33*   < > 28   < > 33* 38*   BUN mg/dL 6 6 6 7 6 6 7   < > 5   < > 11 13   CREATININE mg/dL 0.74 0.76 0.76 0.79 0.72 0.74 0.80  " " < > 0.61   < > 0.84 1.10   CALCIUM mg/dL 8.8 9.0 8.8 8.7 8.4 8.6 8.8   < > 7.5*   < > 8.0* 10.2   AST U/L  --   --   --   --   --   --   --   --  12*  --  14 20   ALT U/L  --   --   --   --   --   --   --   --  11  --  19 27   ALK PHOS U/L  --   --   --   --   --   --   --   --  72  --  78 116*   EGFR ml/min/1.73sq m 117 116 116 114 119 117 114   < > 127   < > 111 85    < > = values in this interval not displayed.       BMP:  Results from last 7 days   Lab Units 10/10/24  0408 10/09/24  2344 10/09/24  1739 10/09/24  1230 10/09/24  1034 10/09/24  0421 10/08/24  2332   POTASSIUM mmol/L 3.7 4.0 4.1 4.6 3.1* 3.7 4.0   CHLORIDE mmol/L 110* 113* 118* 118* 119* 122* 121*   CO2 mmol/L 31 33* 34* 34* 33* 31 33*   BUN mg/dL 6 6 6 7 6 6 7   CREATININE mg/dL 0.74 0.76 0.76 0.79 0.72 0.74 0.80   CALCIUM mg/dL 8.8 9.0 8.8 8.7 8.4 8.6 8.8       No results found for: \"NTBNP\"     Results from last 7 days   Lab Units 10/10/24  0415 10/09/24  0421 10/07/24  2347 10/07/24  1749 10/07/24  1435 10/07/24  0422 10/06/24  1235   MAGNESIUM mg/dL 2.5 2.6 2.4 2.4 2.3 2.2 2.3             Results from last 7 days   Lab Units 10/08/24  0746   TSH 3RD GENERATON uIU/mL 3.179       Results from last 7 days   Lab Units 10/08/24  0344 10/05/24  2020   INR  1.46* 1.63*       Lipid Profile:   No results found for: \"CHOL\"  Lab Results   Component Value Date    HDL 45 12/10/2022     Lab Results   Component Value Date    LDLCALC 82 12/10/2022     Lab Results   Component Value Date    TRIG 280 (H) 12/10/2022       Cardiac testing:   No results found for this or any previous visit.    No results found for this or any previous visit.    No results found for this or any previous visit.    No valid procedures specified.  No results found for this or any previous visit.      Meds/Allergies   all current active meds have been reviewed and current meds:   Current Facility-Administered Medications:   •  acetaminophen (TYLENOL) oral suspension 650 mg, Q6H PRN  •  " acetylcysteine (MUCOMYST) 200 mg/mL inhalation solution 600 mg, Q6H  •  apixaban (ELIQUIS) tablet 5 mg, BID  •  [COMPLETED] bisacodyl (DULCOLAX) rectal suppository 10 mg, Once **FOLLOWED BY** bisacodyl (DULCOLAX) rectal suppository 10 mg, Daily PRN  •  chlorhexidine (PERIDEX) 0.12 % oral rinse 15 mL, Q12H ISAEL  •  dextrose 5 % infusion, Continuous  •  DOPamine (INTROPIN) 400 mg in 250 mL infusion (premix), Titrated, Last Rate: 10 mcg/kg/min (10/10/24 0203)  •  fentaNYL injection 50 mcg, Q2H PRN  •  levalbuterol (XOPENEX) inhalation solution 1.25 mg, Q6H  •  NOREPINEPHRINE 4 MG  ML NSS (CMPD ORDER) infusion, Titrated, Last Rate: Stopped (10/09/24 1325)  •  [COMPLETED] piperacillin-tazobactam (ZOSYN) 4.5 g in sodium chloride 0.9 % 100 mL IV LOADING DOSE, Once, Last Rate: 4.5 g (10/09/24 1200) **FOLLOWED BY** piperacillin-tazobactam (ZOSYN) 4.5 g in sodium chloride 0.9 % 100 mL IVPB (EXTENDED INFUSION), Q8H, Last Rate: 4.5 g (10/10/24 0728)  •  polyethylene glycol (MIRALAX) packet 17 g, Daily  •  senna-docusate sodium (SENOKOT S) 8.6-50 mg per tablet 2 tablet, BID  dextrose, 100 mL/hr  DOPamine in dextrose 5%, 5-20 mcg/kg/min, Last Rate: 10 mcg/kg/min (10/10/24 0203)  norepinephrine, 1-30 mcg/min, Last Rate: Stopped (10/09/24 1325)            Counseling / Coordination of Care  Total floor / unit time spent today 20 minutes.  Greater than 50% of total time was spent with the patient and / or family counseling and / or coordination of care.      ** Please Note: Dragon 360 Dictation voice to text software may have been used in the creation of this document. **

## 2024-10-10 NOTE — ASSESSMENT & PLAN NOTE
38 y/o male with prior PE on Eliquis, concern for neuromuscular syndrome, metastatic L testicular seminoma, tracheostomy, prior cardiac arrest, G-tube placement, anxiety, depression, bipolar disorder, who presented initially to [facility] ([place] on 10/5 after pulling his G-tube out. In ED, he was noted to be tachycardic, hypoxic, febrile, with leukocytosis. He was found to have L sided opacification on CXR and was started on antibiotics. He was subsequently transferred to [facility] ICU for further monitoring and management. While in ICU, he has been having episodes of bradycardia and hypoxia, which seem to occur during coughing episodes or when he is agitated/anxious. He received atropine on 10/8 for an episode of pause/arrest. Overnight into 10/9, patient was noted to have seizure-like activity, described as rhythmic movements and eye rolling that responded to Ativan. No bradycardia/hypoxia reportedly occurred during this. Neurology consulted for further evaluation of seizure-like activity.    Patient had another episode of seizure-like activity earlier this morning. ICU team was able to capture on video- patient has his eyes open and has rhythmic jerking of his body that lasted several seconds before resolving spontaneously. He was noted to be back to baseline after the episode resolved. Unclear etiology at this time. Cannot fully rule out seizure. Workup as noted below.    Workup:  -10/9 CT head: No acute intracranial abnormality.    Plan:  -vEEG pending for further evaluation  -Seizure precautions  -Per attending neurologist, recommend no further Ativan at this time  -Hold off on AED at this time  -PT/OT as able  -Monitor neuro exam; notify with any changes  -Medical management and supportive care per ICU. Correction of any metabolic or infectious disturbances.   -Will review case and treatment plan with attending neurologistBrandon. Please see attending attestation for any further recommendations.

## 2024-10-11 ENCOUNTER — APPOINTMENT (INPATIENT)
Dept: CT IMAGING | Facility: HOSPITAL | Age: 39
DRG: 720 | End: 2024-10-11
Payer: COMMERCIAL

## 2024-10-11 ENCOUNTER — APPOINTMENT (INPATIENT)
Dept: RADIOLOGY | Facility: HOSPITAL | Age: 39
DRG: 720 | End: 2024-10-11
Payer: COMMERCIAL

## 2024-10-11 PROBLEM — Z71.89 GOALS OF CARE, COUNSELING/DISCUSSION: Status: ACTIVE | Noted: 2024-10-11

## 2024-10-11 PROBLEM — Z51.5 PALLIATIVE CARE BY SPECIALIST: Status: ACTIVE | Noted: 2024-10-11

## 2024-10-11 LAB
AFP-TM SERPL-MCNC: 1.59 NG/ML (ref 0–9)
ANION GAP SERPL CALCULATED.3IONS-SCNC: 6 MMOL/L (ref 4–13)
ANION GAP SERPL CALCULATED.3IONS-SCNC: 7 MMOL/L (ref 4–13)
BACTERIA BLD CULT: NORMAL
BASE EX.OXY STD BLDV CALC-SCNC: 75.6 % (ref 60–80)
BASE EXCESS BLDV CALC-SCNC: 4.4 MMOL/L
BUN SERPL-MCNC: 7 MG/DL (ref 5–25)
BUN SERPL-MCNC: 8 MG/DL (ref 5–25)
CALCIUM SERPL-MCNC: 7.9 MG/DL (ref 8.4–10.2)
CALCIUM SERPL-MCNC: 8.3 MG/DL (ref 8.4–10.2)
CHLORIDE SERPL-SCNC: 107 MMOL/L (ref 96–108)
CHLORIDE SERPL-SCNC: 108 MMOL/L (ref 96–108)
CO2 SERPL-SCNC: 30 MMOL/L (ref 21–32)
CO2 SERPL-SCNC: 31 MMOL/L (ref 21–32)
CREAT SERPL-MCNC: 0.69 MG/DL (ref 0.6–1.3)
CREAT SERPL-MCNC: 0.71 MG/DL (ref 0.6–1.3)
ERYTHROCYTE [DISTWIDTH] IN BLOOD BY AUTOMATED COUNT: 17.8 % (ref 11.6–15.1)
FSH SERPL-ACNC: <0.2 MIU/ML
GFR SERPL CREATININE-BSD FRML MDRD: 119 ML/MIN/1.73SQ M
GFR SERPL CREATININE-BSD FRML MDRD: 121 ML/MIN/1.73SQ M
GLUCOSE SERPL-MCNC: 109 MG/DL (ref 65–140)
GLUCOSE SERPL-MCNC: 109 MG/DL (ref 65–140)
GLUCOSE SERPL-MCNC: 157 MG/DL (ref 65–140)
HCG SERPL QL: NEGATIVE
HCG-TM SERPL-SCNC: 0.6 MLU/ML
HCO3 BLDV-SCNC: 28.9 MMOL/L (ref 24–30)
HCT VFR BLD AUTO: 39.1 % (ref 36.5–49.3)
HGB BLD-MCNC: 12.1 G/DL (ref 12–17)
HOROWITZ INDEX BLDA+IHG-RTO: 40 MM[HG]
LH SERPL-ACNC: <0.2 MIU/ML
MAGNESIUM SERPL-MCNC: 2.3 MG/DL (ref 1.9–2.7)
MCH RBC QN AUTO: 30.4 PG (ref 26.8–34.3)
MCHC RBC AUTO-ENTMCNC: 30.9 G/DL (ref 31.4–37.4)
MCV RBC AUTO: 98 FL (ref 82–98)
O2 CT BLDV-SCNC: 14.3 ML/DL
PCO2 BLDV: 42.7 MM HG (ref 42–50)
PEEP RESPIRATORY: 6 CM[H2O]
PH BLDV: 7.45 [PH] (ref 7.3–7.4)
PHOSPHATE SERPL-MCNC: 3.2 MG/DL (ref 2.7–4.5)
PLATELET # BLD AUTO: 169 THOUSANDS/UL (ref 149–390)
PMV BLD AUTO: 10.7 FL (ref 8.9–12.7)
PO2 BLDV: 40.7 MM HG (ref 35–45)
POTASSIUM SERPL-SCNC: 3.4 MMOL/L (ref 3.5–5.3)
POTASSIUM SERPL-SCNC: 3.8 MMOL/L (ref 3.5–5.3)
PROCALCITONIN SERPL-MCNC: 0.45 NG/ML
PROLACTIN SERPL-MCNC: 56.51 NG/ML
RBC # BLD AUTO: 3.98 MILLION/UL (ref 3.88–5.62)
SODIUM SERPL-SCNC: 144 MMOL/L (ref 135–147)
SODIUM SERPL-SCNC: 145 MMOL/L (ref 135–147)
VENT AC: 18
VENT- AC: AC
VT SETTING VENT: 500 ML
WBC # BLD AUTO: 11.51 THOUSAND/UL (ref 4.31–10.16)

## 2024-10-11 PROCEDURE — 82105 ALPHA-FETOPROTEIN SERUM: CPT

## 2024-10-11 PROCEDURE — 99233 SBSQ HOSP IP/OBS HIGH 50: CPT | Performed by: INTERNAL MEDICINE

## 2024-10-11 PROCEDURE — 83002 ASSAY OF GONADOTROPIN (LH): CPT

## 2024-10-11 PROCEDURE — 80048 BASIC METABOLIC PNL TOTAL CA: CPT | Performed by: INTERNAL MEDICINE

## 2024-10-11 PROCEDURE — 94760 N-INVAS EAR/PLS OXIMETRY 1: CPT

## 2024-10-11 PROCEDURE — 99232 SBSQ HOSP IP/OBS MODERATE 35: CPT | Performed by: INTERNAL MEDICINE

## 2024-10-11 PROCEDURE — 99232 SBSQ HOSP IP/OBS MODERATE 35: CPT | Performed by: STUDENT IN AN ORGANIZED HEALTH CARE EDUCATION/TRAINING PROGRAM

## 2024-10-11 PROCEDURE — 94669 MECHANICAL CHEST WALL OSCILL: CPT

## 2024-10-11 PROCEDURE — 94640 AIRWAY INHALATION TREATMENT: CPT

## 2024-10-11 PROCEDURE — 74177 CT ABD & PELVIS W/CONTRAST: CPT

## 2024-10-11 PROCEDURE — 84403 ASSAY OF TOTAL TESTOSTERONE: CPT

## 2024-10-11 PROCEDURE — 83735 ASSAY OF MAGNESIUM: CPT

## 2024-10-11 PROCEDURE — 84702 CHORIONIC GONADOTROPIN TEST: CPT

## 2024-10-11 PROCEDURE — NC001 PR NO CHARGE: Performed by: STUDENT IN AN ORGANIZED HEALTH CARE EDUCATION/TRAINING PROGRAM

## 2024-10-11 PROCEDURE — 71260 CT THORAX DX C+: CPT

## 2024-10-11 PROCEDURE — 94664 DEMO&/EVAL PT USE INHALER: CPT

## 2024-10-11 PROCEDURE — 82805 BLOOD GASES W/O2 SATURATION: CPT

## 2024-10-11 PROCEDURE — 82948 REAGENT STRIP/BLOOD GLUCOSE: CPT

## 2024-10-11 PROCEDURE — 99443 PR PHYS/QHP TELEPHONE EVALUATION 21-30 MIN: CPT | Performed by: PHYSICIAN ASSISTANT

## 2024-10-11 PROCEDURE — 84402 ASSAY OF FREE TESTOSTERONE: CPT

## 2024-10-11 PROCEDURE — 84703 CHORIONIC GONADOTROPIN ASSAY: CPT

## 2024-10-11 PROCEDURE — 95720 EEG PHY/QHP EA INCR W/VEEG: CPT | Performed by: PSYCHIATRY & NEUROLOGY

## 2024-10-11 PROCEDURE — 99233 SBSQ HOSP IP/OBS HIGH 50: CPT | Performed by: PSYCHIATRY & NEUROLOGY

## 2024-10-11 PROCEDURE — 83001 ASSAY OF GONADOTROPIN (FSH): CPT

## 2024-10-11 PROCEDURE — 85027 COMPLETE CBC AUTOMATED: CPT

## 2024-10-11 PROCEDURE — 94003 VENT MGMT INPAT SUBQ DAY: CPT

## 2024-10-11 PROCEDURE — 84145 PROCALCITONIN (PCT): CPT

## 2024-10-11 PROCEDURE — 94150 VITAL CAPACITY TEST: CPT

## 2024-10-11 PROCEDURE — 71045 X-RAY EXAM CHEST 1 VIEW: CPT

## 2024-10-11 PROCEDURE — 84146 ASSAY OF PROLACTIN: CPT

## 2024-10-11 PROCEDURE — 84100 ASSAY OF PHOSPHORUS: CPT

## 2024-10-11 RX ORDER — FLUDROCORTISONE ACETATE 0.1 MG/1
0.05 TABLET ORAL DAILY
Status: DISCONTINUED | OUTPATIENT
Start: 2024-10-12 | End: 2024-10-15

## 2024-10-11 RX ORDER — FENTANYL CITRATE 50 UG/ML
50 INJECTION, SOLUTION INTRAMUSCULAR; INTRAVENOUS ONCE
Status: COMPLETED | OUTPATIENT
Start: 2024-10-11 | End: 2024-10-11

## 2024-10-11 RX ORDER — ATROPINE SULFATE 1 MG/ML
INJECTION, SOLUTION INTRAVENOUS
Status: DISCONTINUED
Start: 2024-10-11 | End: 2024-10-11 | Stop reason: WASHOUT

## 2024-10-11 RX ORDER — FENTANYL CITRATE 50 UG/ML
100 INJECTION, SOLUTION INTRAMUSCULAR; INTRAVENOUS ONCE
Status: COMPLETED | OUTPATIENT
Start: 2024-10-11 | End: 2024-10-11

## 2024-10-11 RX ORDER — QUETIAPINE FUMARATE 25 MG/1
25 TABLET, FILM COATED ORAL
Status: DISCONTINUED | OUTPATIENT
Start: 2024-10-11 | End: 2024-10-17

## 2024-10-11 RX ORDER — MIDODRINE HYDROCHLORIDE 5 MG/1
5 TABLET ORAL EVERY 8 HOURS SCHEDULED
Status: DISCONTINUED | OUTPATIENT
Start: 2024-10-11 | End: 2024-10-12

## 2024-10-11 RX ORDER — LORAZEPAM 2 MG/ML
2 INJECTION INTRAMUSCULAR ONCE
Status: COMPLETED | OUTPATIENT
Start: 2024-10-11 | End: 2024-10-11

## 2024-10-11 RX ORDER — HALOPERIDOL 2 MG/ML
2 SOLUTION ORAL 2 TIMES DAILY
Status: DISCONTINUED | OUTPATIENT
Start: 2024-10-11 | End: 2024-10-11

## 2024-10-11 RX ORDER — POTASSIUM CHLORIDE 29.8 MG/ML
40 INJECTION INTRAVENOUS ONCE
Status: COMPLETED | OUTPATIENT
Start: 2024-10-11 | End: 2024-10-11

## 2024-10-11 RX ORDER — ACETAMINOPHEN 160 MG/5ML
650 SUSPENSION ORAL EVERY 6 HOURS PRN
Status: DISCONTINUED | OUTPATIENT
Start: 2024-10-11 | End: 2024-10-31 | Stop reason: HOSPADM

## 2024-10-11 RX ORDER — LORAZEPAM 2 MG/ML
INJECTION INTRAMUSCULAR
Status: COMPLETED
Start: 2024-10-11 | End: 2024-10-11

## 2024-10-11 RX ORDER — EPINEPHRINE 0.1 MG/ML
INJECTION INTRAVENOUS
Status: DISCONTINUED
Start: 2024-10-11 | End: 2024-10-11 | Stop reason: WASHOUT

## 2024-10-11 RX ORDER — AMOXICILLIN 250 MG
1 CAPSULE ORAL 2 TIMES DAILY
Status: DISCONTINUED | OUTPATIENT
Start: 2024-10-11 | End: 2024-10-13

## 2024-10-11 RX ADMIN — LORAZEPAM 2 MG: 2 INJECTION INTRAMUSCULAR; INTRAVENOUS at 17:03

## 2024-10-11 RX ADMIN — NOREPINEPHRINE BITARTRATE 4 MCG/MIN: 1 INJECTION INTRAVENOUS at 02:05

## 2024-10-11 RX ADMIN — LEVALBUTEROL HYDROCHLORIDE 1.25 MG: 1.25 SOLUTION RESPIRATORY (INHALATION) at 13:19

## 2024-10-11 RX ADMIN — HYDROCORTISONE SODIUM SUCCINATE 50 MG: 100 INJECTION, POWDER, FOR SOLUTION INTRAMUSCULAR; INTRAVENOUS at 23:35

## 2024-10-11 RX ADMIN — HYDROCORTISONE SODIUM SUCCINATE 50 MG: 100 INJECTION, POWDER, FOR SOLUTION INTRAMUSCULAR; INTRAVENOUS at 00:10

## 2024-10-11 RX ADMIN — HYDROCORTISONE SODIUM SUCCINATE 50 MG: 100 INJECTION, POWDER, FOR SOLUTION INTRAMUSCULAR; INTRAVENOUS at 17:06

## 2024-10-11 RX ADMIN — LORAZEPAM 2 MG: 2 INJECTION INTRAMUSCULAR at 17:03

## 2024-10-11 RX ADMIN — CHLORHEXIDINE GLUCONATE 15 ML: 1.2 RINSE ORAL at 09:20

## 2024-10-11 RX ADMIN — MIDODRINE HYDROCHLORIDE 5 MG: 5 TABLET ORAL at 16:30

## 2024-10-11 RX ADMIN — PIPERACILLIN SODIUM AND TAZOBACTAM SODIUM 4.5 G: 36; 4.5 INJECTION, POWDER, LYOPHILIZED, FOR SOLUTION INTRAVENOUS at 09:00

## 2024-10-11 RX ADMIN — APIXABAN 5 MG: 5 TABLET, FILM COATED ORAL at 09:20

## 2024-10-11 RX ADMIN — HYDROCORTISONE SODIUM SUCCINATE 50 MG: 100 INJECTION, POWDER, FOR SOLUTION INTRAMUSCULAR; INTRAVENOUS at 11:23

## 2024-10-11 RX ADMIN — ACETYLCYSTEINE 600 MG: 200 SOLUTION ORAL; RESPIRATORY (INHALATION) at 19:16

## 2024-10-11 RX ADMIN — QUETIAPINE FUMARATE 25 MG: 25 TABLET ORAL at 21:16

## 2024-10-11 RX ADMIN — SENNOSIDES AND DOCUSATE SODIUM 1 TABLET: 8.6; 5 TABLET ORAL at 09:20

## 2024-10-11 RX ADMIN — LEVALBUTEROL HYDROCHLORIDE 1.25 MG: 1.25 SOLUTION RESPIRATORY (INHALATION) at 07:44

## 2024-10-11 RX ADMIN — LEVALBUTEROL HYDROCHLORIDE 1.25 MG: 1.25 SOLUTION RESPIRATORY (INHALATION) at 01:18

## 2024-10-11 RX ADMIN — CHLORHEXIDINE GLUCONATE 15 ML: 1.2 RINSE ORAL at 21:16

## 2024-10-11 RX ADMIN — SENNOSIDES AND DOCUSATE SODIUM 1 TABLET: 8.6; 5 TABLET ORAL at 17:06

## 2024-10-11 RX ADMIN — ACETYLCYSTEINE 600 MG: 200 SOLUTION ORAL; RESPIRATORY (INHALATION) at 01:18

## 2024-10-11 RX ADMIN — ACETYLCYSTEINE 600 MG: 200 SOLUTION ORAL; RESPIRATORY (INHALATION) at 13:20

## 2024-10-11 RX ADMIN — LEVALBUTEROL HYDROCHLORIDE 1.25 MG: 1.25 SOLUTION RESPIRATORY (INHALATION) at 19:16

## 2024-10-11 RX ADMIN — POTASSIUM CHLORIDE 40 MEQ: 29.8 INJECTION, SOLUTION INTRAVENOUS at 09:00

## 2024-10-11 RX ADMIN — FLUDROCORTISONE ACETATE 0.05 MG: 0.1 TABLET ORAL at 09:26

## 2024-10-11 RX ADMIN — APIXABAN 5 MG: 5 TABLET, FILM COATED ORAL at 17:06

## 2024-10-11 RX ADMIN — HALOPERIDOL 2 MG: 2 SOLUTION ORAL at 11:00

## 2024-10-11 RX ADMIN — PIPERACILLIN SODIUM AND TAZOBACTAM SODIUM 4.5 G: 36; 4.5 INJECTION, POWDER, LYOPHILIZED, FOR SOLUTION INTRAVENOUS at 23:35

## 2024-10-11 RX ADMIN — IOHEXOL 100 ML: 350 INJECTION, SOLUTION INTRAVENOUS at 12:37

## 2024-10-11 RX ADMIN — FENTANYL CITRATE 50 MCG: 50 INJECTION INTRAMUSCULAR; INTRAVENOUS at 04:38

## 2024-10-11 RX ADMIN — FENTANYL CITRATE 50 MCG: 50 INJECTION INTRAMUSCULAR; INTRAVENOUS at 15:55

## 2024-10-11 RX ADMIN — PIPERACILLIN SODIUM AND TAZOBACTAM SODIUM 4.5 G: 36; 4.5 INJECTION, POWDER, LYOPHILIZED, FOR SOLUTION INTRAVENOUS at 00:37

## 2024-10-11 RX ADMIN — FENTANYL CITRATE 100 MCG: 50 INJECTION INTRAMUSCULAR; INTRAVENOUS at 00:09

## 2024-10-11 RX ADMIN — FENTANYL CITRATE 50 MCG: 50 INJECTION INTRAMUSCULAR; INTRAVENOUS at 12:00

## 2024-10-11 RX ADMIN — HYDROCORTISONE SODIUM SUCCINATE 50 MG: 100 INJECTION, POWDER, FOR SOLUTION INTRAMUSCULAR; INTRAVENOUS at 05:09

## 2024-10-11 RX ADMIN — PIPERACILLIN SODIUM AND TAZOBACTAM SODIUM 4.5 G: 36; 4.5 INJECTION, POWDER, LYOPHILIZED, FOR SOLUTION INTRAVENOUS at 16:00

## 2024-10-11 RX ADMIN — FENTANYL CITRATE 50 MCG: 50 INJECTION INTRAMUSCULAR; INTRAVENOUS at 16:45

## 2024-10-11 RX ADMIN — ACETYLCYSTEINE 600 MG: 200 SOLUTION ORAL; RESPIRATORY (INHALATION) at 07:44

## 2024-10-11 RX ADMIN — MIDODRINE HYDROCHLORIDE 5 MG: 5 TABLET ORAL at 21:16

## 2024-10-11 NOTE — RESPIRATORY THERAPY NOTE
10/11/24 0800   Respiratory Assessment   Resp Comments Called to pt room for desat episode. Upon entering room pt was bearing down and not achieving VT. Lavaged and Sx pt for sm thick clear/yellowish secretions. Pt continued to Desat in the 60's. Gave manual breaths through the vent . Sat increased to 99% once pt got through eposide and stopped bearing down.

## 2024-10-11 NOTE — ASSESSMENT & PLAN NOTE
Likely vagal response to coughing.  Spontaneously resolves without intervention.  Was initially started on dopamine with heart rate goal of 60.  Today had open heart rates up to 120s spontaneously dropping to 60s.  Dopamine has been off this morning.  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    Plan:  Continue hydrocortisone 50 mg every 6  Continue  fludrocortisone 0.05 mg daily.  Will continue to monitor.

## 2024-10-11 NOTE — PROGRESS NOTES
Progress Note - Neurology   Name: Hemanth Swanson 39 y.o. 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  In the ED, he was noted to be tachycardic, hypoxic, and febrile with leukocytosis.  He was found to have L sided opacification on CXR and was started on antibiotics.  He was subsequently transferred to [facility] ICU for further monitoring and management. Bronchoscopy cultures positive for Proteus and Pseudomonas.     While in the ICU, he has been having episodes of bradycardia and hypoxia, which seem to occur during coughing episodes or when he is agitated/anxious.  On 10/8 into 10/9, patient was noted to have seizure-like activity, described as rhythmic movements and eye rolling that responded to Ativan. No bradycardia/hypoxia reportedly occurred during this episode. CT head negative for acute intracranial abnormalities.  Neurology consulted for further evaluation of seizure-like activity.    Patient had seizure like activity yesterday morning (10/10) with eyes open and rhythmic jerking of his body lasting several minutes and resolving spontaneously.  VEEG was started.  Overnight, patient had further shaking/convulsing episodes in the setting of hypoxia in the 60s-70s, bradycardia in the low 40s, and hypotension requiring pressors.    VEEG captured numerous events of patient repetitively  extending his leg/pushing against the footboard resulting in whole body rocking up and down in the bed.  He also had multiple events of left arm shaking and multiple events of jaw tremor often with eyes reported to be rolling back.  All of these events were without EEG changes other than motion artifact and do not look to be seizures.  His EEG overall has normal alpha rhythm, but some intermittent left temporal slowing.  No epileptiform discharges or seizures.    Etiology for shaking spells likely due to vagal response and possibly convulsive syncope in the setting of hypoxia/bradycardia.  There may also be a functional component.  No evidence of seizures.    Plan:  - Can discontinue vEEG  - Hold off on administering Ativan for shaking spells as these are not seizures  - No need for AED  - PT/OT as able  - Monitor neuro exam; notify with any changes  - Medical management and supportive care per ICU. Correction of any metabolic or infectious disturbances.   - No further inpatient Neuro recommendations.  Please call with any questions or concerns.  Sepsis (HCC)  - Noted to be febrile, tachycardic, with elevated WBC/lactic acid on presentation  - Initial CXR: Complete opacification of the left hemithorax with mediastinal shift to the left indicating at least in part atelectasis.   - Initial labs: lactic acid 12.1, WBC 14.76, COVID/flu/RSV negative  - Blood cultures negative  - UA negative  - Sputum culture positive for Proteus mirabilis, Pseudomonas aeruginosa  - Currently on Zosyn  - Medical management per ICU  Chronic respiratory failure with hypoxia and hypercapnia (HCC)  - Tracheostomy with vent dependence  - Episodes of hypoxia  - Medical management per ICU  Bradycardia  - Noted with HR dropping as low as 30-40s  - Received atropine on 10/8 for episode of arrest/pause and on 10/9 for bradycardia  - Cardiology following; appreciate recommendations  No recommendation for PPM at this time  Suspected to be in the  "setting of vasovagal/agitation  - Telemetry  - Medical management per ICU and Cardiology  Hypernatremia  - Noted on presentation  - Nephrology following  - Medical management per ICU and Nephrology    Hemanth Swanson will need regular follow-up with Dr. Dc in 60 minute appointment.      Subjective   Patient seen and examined at bedside.  Patient seen while having one of his \"episodes\" per his nurse and respiratory.  Patient is not responding and has intermittent arrhythmic movements of the arms and legs.  He resists eyelid opening.  Remains nonverbal.  He will occasionally spit at the providers in the room.  Per discussion with epileptologist, no EEG correlate.  Patient was hypoxic during this event.    Review of Systems   Unable to perform ROS: Mental status change       Objective :  Temp:  [96.8 °F (36 °C)-98.4 °F (36.9 °C)] 96.8 °F (36 °C)  HR:  [] 49  BP: ()/(41-94) 98/66  Resp:  [18] 18  SpO2:  [56 %-100 %] 99 %  O2 Device: Ventilator  FiO2 (%):  [40] 40    Physical Exam  Vitals and nursing note reviewed.   Constitutional:       Comments: Patient lying in bed with eyes closed and arrhythmic movements of the arms and legs.  Resists eyelid opening.  Occasionally spits at this examiner   HENT:      Head:      Comments: EEG leads in place  Pulmonary:      Comments: Trach in place  Musculoskeletal:      Comments: Arrhythmic movements of both arms and legs.   Skin:     Comments: Diaphoretic     Neurological Exam  Mental Status    Awake, but keeps eyes closed throughout the exam  Does not follow any commands.  Nonverbal throughout the exam.    Cranial Nerves  Resists eyelid opening, so unable to visualize pupils/EOMs  No clear facial asymmetry  Difficulty assessing remainder of cranial nerves due to current mental status..    Motor    Spontaneously moves all extremities.  Movements appear symmetric  Localizes central noxious stimuli with bilateral upper extremities.  When assessing tone in the right " upper extremity, patient resists passive motion.  5/5 bilateral  strength.  Bends both knees spontaneously..    Sensory  Quickly withdraws all extremities to noxious stimuli.    Reflexes  No ankle clonus bilaterally.  No tremors  Arrhythmic movements of arms and legs.    Coordination    Unable to assess at this time due to mental status.        Lab Results: I have reviewed the following results:  Imaging Results Review: I reviewed radiology reports from this admission including: CT head.  Other Study Results Review: No additional pertinent studies reviewed.    VTE Pharmacologic Prophylaxis: VTE covered by:  apixaban, Per PEG Tube, 5 mg at 10/11/24 0920    and Sequential compression device (Venodyne)

## 2024-10-11 NOTE — NURSING NOTE
"Patient had multiple \"episodes\" overnight, pt became hypoxic in the 60s-70s, became cyanotic, hypotensive requiring increased pressors, and bradycardic into the mid to low 40's. Pt shakes/ convulses during episodes and requires manual bagging to improve o2 sats. Rt, CCAP and RN at bedside.   "

## 2024-10-11 NOTE — PLAN OF CARE
Problem: Prexisting or High Potential for Compromised Skin Integrity  Goal: Skin integrity is maintained or improved  Description: INTERVENTIONS:  - Identify patients at risk for skin breakdown  - Assess and monitor skin integrity  - Assess and monitor nutrition and hydration status  - Monitor labs   - Assess for incontinence   - Turn and reposition patient  - Assist with mobility/ambulation  - Relieve pressure over bony prominences  - Avoid friction and shearing  - Provide appropriate hygiene as needed including keeping skin clean and dry  - Evaluate need for skin moisturizer/barrier cream  - Collaborate with interdisciplinary team   - Patient/family teaching  - Consider wound care consult   Outcome: Progressing     Problem: PAIN - ADULT  Goal: Verbalizes/displays adequate comfort level or baseline comfort level  Description: Interventions:  - Encourage patient to monitor pain and request assistance  - Assess pain using appropriate pain scale  - Administer analgesics based on type and severity of pain and evaluate response  - Implement non-pharmacological measures as appropriate and evaluate response  - Consider cultural and social influences on pain and pain management  - Notify physician/advanced practitioner if interventions unsuccessful or patient reports new pain  Outcome: Progressing     Problem: INFECTION - ADULT  Goal: Absence or prevention of progression during hospitalization  Description: INTERVENTIONS:  - Assess and monitor for signs and symptoms of infection  - Monitor lab/diagnostic results  - Monitor all insertion sites, i.e. indwelling lines, tubes, and drains  - Monitor endotracheal if appropriate and nasal secretions for changes in amount and color  - [place] appropriate cooling/warming therapies per order  - Administer medications as ordered  - Instruct and encourage patient and family to use good hand hygiene technique  - Identify and instruct in appropriate isolation precautions for  identified infection/condition  Outcome: Progressing  Goal: Absence of fever/infection during neutropenic period  Description: INTERVENTIONS:  - Monitor WBC    Outcome: Progressing     Problem: SAFETY ADULT  Goal: Patient will remain free of falls  Description: INTERVENTIONS:  - Educate patient/family on patient safety including physical limitations  - Instruct patient to call for assistance with activity   - Consult OT/PT to assist with strengthening/mobility   - Keep Call bell within reach  - Apply yellow socks and bracelet for high fall risk patients  - Consider moving patient to room near nurses station  Outcome: Progressing  Goal: Maintain or return to baseline ADL function  Description: INTERVENTIONS:  -  Assess patient's ability to carry out ADLs; assess patient's baseline for ADL function and identify physical deficits which impact ability to perform ADLs (bathing, care of mouth/teeth, toileting, grooming, dressing, etc.)  - Assess/evaluate cause of self-care deficits   - Assess range of motion  - Assess patient's mobility; develop plan if impaired  - Assess patient's need for assistive devices and provide as appropriate  - Encourage maximum independence but intervene and supervise when necessary  - Involve family in performance of ADLs  - Assess for home care needs following discharge   - Consider OT consult to assist with ADL evaluation and planning for discharge  - Provide patient education as appropriate  Outcome: Progressing  Goal: Maintains/Returns to pre admission functional level  Description: INTERVENTIONS:  - Perform AM-PAC 6 Click Basic Mobility/ Daily Activity assessment daily.  - Set and communicate daily mobility goal to care team and patient/family/caregiver.   - Out of bed for toileting  - Record patient progress and toleration of activity level   Outcome: Progressing     Problem: DISCHARGE PLANNING  Goal: Discharge to home or other facility with appropriate resources  Description:  INTERVENTIONS:  - Identify barriers to discharge w/patient and caregiver  - Arrange for needed discharge resources and transportation as appropriate  - Identify discharge learning needs (meds, wound care, etc.)  - Arrange for interpretive services to assist at discharge as needed  - Refer to Case Management Department for coordinating discharge planning if the patient needs post-hospital services based on physician/advanced practitioner order or complex needs related to functional status, cognitive ability, or social support system  Outcome: Progressing     Problem: Knowledge Deficit  Goal: Patient/family/caregiver demonstrates understanding of disease process, treatment plan, medications, and discharge instructions  Description: Complete learning assessment and assess knowledge base.  Interventions:  - Provide teaching at level of understanding  - Provide teaching via preferred learning methods  Outcome: Progressing     Problem: SAFETY,RESTRAINT: NV/NON-SELF DESTRUCTIVE BEHAVIOR  Goal: Remains free of harm/injury (restraint for non violent/non self-detsructive behavior)  Description: INTERVENTIONS:  - Instruct patient/family regarding restraint use   - Assess and monitor physiologic and psychological status   - Provide interventions and comfort measures to meet assessed patient needs   - Identify and implement measures to help patient regain control  - Assess readiness for release of restraint   Outcome: Progressing  Goal: Returns to optimal restraint-free functioning  Description: INTERVENTIONS:  - Assess the patient's behavior and symptoms that indicate continued need for restraint  - Identify and implement measures to help patient regain control  - Assess readiness for release of restraint   Outcome: Progressing     Problem: Nutrition/Hydration-ADULT  Goal: Nutrient/Hydration intake appropriate for improving, restoring or maintaining nutritional needs  Description: Monitor and assess patient's  nutrition/hydration status for malnutrition. Collaborate with interdisciplinary team and initiate plan and interventions as ordered.  Monitor patient's weight and dietary intake as ordered or per policy. Utilize nutrition screening tool and intervene as necessary. Determine patient's food preferences and provide high-protein, high-caloric foods as appropriate.     INTERVENTIONS:  - Monitor oral intake, urinary output, labs, and treatment plans  - Assess nutrition and hydration status and recommend course of action  - Evaluate amount of meals eaten  - Assist patient with eating if necessary   - Allow adequate time for meals  - Recommend/ encourage appropriate diets, oral nutritional supplements, and vitamin/mineral supplements  - Order, calculate, and assess calorie counts as needed  - Recommend, monitor, and adjust tube feedings and TPN/PPN based on assessed needs  - Assess need for intravenous fluids  - Provide specific nutrition/hydration education as appropriate  - Include patient/family/caregiver in decisions related to nutrition  Outcome: Progressing

## 2024-10-11 NOTE — RESPIRATORY THERAPY NOTE
10/11/24 1228   Respiratory Assessment   Resp Comments Pt transported down to CT on transport vent. Upon transfer over to CT table pt started bearing down and desat. Took off vent and bagged till pt stopped bearing down and sat went back up to 97%

## 2024-10-11 NOTE — RESPIRATORY THERAPY NOTE
10/11/24 1705   Respiratory Assessment   Resp Comments Responded to vent alarm. Upon entrering the room pt was high pressuring. Not achieveing VT. he was having tremors and his eyes were rolled in the back of his head. Began to mannually ventilate the pt but still unable to bring sat about 85. Dr Sutherland and the residents came and ordered meds to be given. Once Ativan was administer, Pt then ceased with the tremor and VT came back and peak pressures went back to normal

## 2024-10-11 NOTE — ASSESSMENT & PLAN NOTE
[facility] ED: febrile, tachycardic, with leukocytosis and a lactate of 12.  X-ray with left sided opacification, increased secretions from tracheostomy, initial pro killian negative. Received weight based fluids.    Transferred to [facility] for further management.  Repeat lab work upon arrival show persistent leukocytosis and hypernatremia.  Urine strep and Legionella negative  [facility] culture revealed Proteus and Pseudomonas    Plan:  Continue Zosyn  Continue vent support, frequent suctioning  Xopenex nebulizers

## 2024-10-11 NOTE — ASSESSMENT & PLAN NOTE
Metastatic left testicular seminoma s/p orchiectomy/chemo 12/2022  Mets to lymph nodes discovered 01/2023; began chemo in march 2023 but did not complete regimen due to adverse effects  Follows with outpatient oncology

## 2024-10-11 NOTE — RESPIRATORY THERAPY NOTE
10/11/24 0744   Respiratory Assessment   Assessment Type During-treatment   General Appearance Awake;Alert   Respiratory Pattern Assisted;Symmetrical   Chest Assessment Chest expansion symmetrical   Bilateral Breath Sounds Diminished   Cough None   Suction Trach   Resp Comments Trial pt on SBT with Peep of 6 and PS of 5. Pt RR decreased to 6. Increased PS increments of 2 up to 10 but pt was unable to pull VT greater octaviano 200 and RR was less than 8 the whole time. Placed back on full support. Trach Care done and inner cannula changed   Vent Information   Vent    Vent type     Vent Mode AC/VC   $ Vital Capacity Mech/Peak Flow Yes   $ Pulse Oximetry Spot Check Charge Completed   SpO2 100 %   AC/VC Settings   Resp Rate (BPM) 18 BPM   Vt (mL) 500 mL   FIO2 (%) 40 %   PEEP (cmH2O) 6 cmH2O   Flow Pattern (LPM) 45 L/min   Trigger Sensitivity Flow (lpm) 3 %   Humidification Heater   Heater Temperature (Set) 98.6 °F (37 °C)   AC/VC Actuals   Resp Rate (BPM) 18 BPM   VT (mL) 525   MV 9.34   MAP (cmH2O) 11 cmH2O   Peak Pressure (cmH2O) 21 cmH2O   I/E Ratio (Obs) 1:1.3   Heater Temperature (Obs) 98.6 °F (37 °C)   Static Compliance (mL/cmH20) 45 mL/cmH2O   Plateau Pressure (cm H2O) 18 cm H2O   AC/VC Alarms   High Peak Pressure (cmH2O) 40   High Resp Rate (BPM) 40 BPM   High MV (L/min) 16 L/min   Low MV (L/min) 3.5 L/min   Vt High (mL) 900 mL   Vt Low (mL) 200 mL   AC/VC Apnea Settings   Resp Rate (BPM) 18 BPM   VT (mL) 500 mL   FIO2 (%) 100 %   Apnea Time (s) 20 S   Apnea Flow (L/min) 45 L/min   Maintenance   Alarm (pink) cable attached Yes   Resuscitation bag with peep valve at bedside Yes   Water bag changed No   Circuit changed No   Daily Screen   Patient safety screen outcome: Failed   Not Ready for Weaning due to: Poor inspiratory effort;Underline problem not resolved   Spont breathing trial outcome: Failed   Spont breathing trial reason failed RR < 8 bpm;Tidal volume < 4ml/Kg of ideal body weight or VE <5  or  >15 LPM   Previous settings resumed Yes   Name of Medical Team Notified: KIRSTIE Arellano   IHSTEPHEN Ventilator Associated Pneumonia Bundle   Daily Awakening Trials Performed Not applicable (Comment)   Daily Assessment of Readiness to Extubate Yes   Head of Bed Elevated HOB 30   Surgical Airway Distal;Cuffed;Other (Comment)   Placement Date/Time: 06/01/24 1155   Tube Size: 6  Placed By: Physician  Placed by (Name): Dr. Ronquillo  Type: Tracheostomy  Style: (c) Distal;Cuffed;Other (Comment)   Status Cuff Inflated   Site Assessment Clean;Dry   Site Care Dried;Cleansed;Dressing applied   Inner Cannula Care Changed/new   Ties Assessment Changed   Surgical Airway Cuff Pressure (color) Green   Equipment at bedside BVM;Wall Suction setup;Additional complete same size trach tube;Additional complete one size smaller trach tube;Sterile saline;Additional inner cannula

## 2024-10-11 NOTE — PROGRESS NOTES
NEPHROLOGY HOSPITAL PROGRESS NOTE   Hemanth Swanson 39 y.o. male MRN: [id number]  Unit/Bed#: ICU 05 Encounter: [id number]  Reason for Consult: Hypernatremia    ASSESSMENT and PLAN:  Hemanth Swanson is a 39 y.o. male who was admitted to [facility] after presenting with dislodged PEG tube and left-sided consolidation. A renal consultation is requested for assistance in the management of hypernatremia.       1.  Hypernatremia.  This is due to lack of free water intake.  Urine osmolality of 910 not suggestive of DI and suggests dehydration.  Serum sodium level on admission 175 at 7:34 PM on 10/05/2024.  Treated with D5W on 10/06 and 10/07 with subsequent discontinuation on 10/07 due to concern for overcorrection.  On 10/08 for sodium level of 159, he was started on free water flushes 250 cc every 4 hours via PEG tube on 10/08 with no improvement in serum sodium level.  On 10/09 for sodium level of 158, he was started on free water flushes 400 cc every 4 hours and D5W which was increased later in the day to 150 cc/h.  On 10/10 for sodium level of 150, he was treated with free water flushes 200 cc every 4 hours and D5W 100 cc/h.  Serum sodium level this morning 144.  Free water deficit to bring down serum sodium level to 140 still around 2 to 3 L.  Continue current Rx of free water flushes and D5W with plan to stop D5W by tomorrow if sodium level remains within normal limits.  We will sign off at this time, please call with questions.    2.  Hypokalemia.  Serum potassium level 3.4.  This is due to administration of D5W.  Continue to supplement potassium.     3.  Sepsis.  Currently being treated for pneumonia.  Antibiotics per ICU team.     4.  Chronic respiratory failure with hypoxia and hypercapnia.  Ventilator support per ICU.    Discussed with ICU team.  After discussion, we agreed that sodium level is currently at a good range and to continue hypotonic fluids and free water flushes and to monitor  sodium level.    SUBJECTIVE / 24H INTERVAL HISTORY:  Overnight became hypoxic, cyanotic and hypotensive requiring increased pressors.  Also became bradycardic.  This morning he is unresponsive on ventilator.    OBJECTIVE:  Current Weight: Weight - Scale: 96.7 kg (213 lb 3 oz)  Vitals:    10/11/24 0530 10/11/24 0600 10/11/24 0700 10/11/24 0744   BP: 94/61 98/66     BP Location:       Pulse: 59 (!) 49     Resp:   18    Temp:   (!) 96.8 °F (36 °C)    TempSrc:   Axillary    SpO2: 99% 99%  100%   Weight:       Height:           Intake/Output Summary (Last 24 hours) at 10/11/2024 1032  Last data filed at 10/11/2024 0600  Gross per 24 hour   Intake 3868.19 ml   Output 300 ml   Net 3568.19 ml     Review of Systems   Unable to perform ROS: Mental status change     Physical Exam  Vitals and nursing note reviewed.   Constitutional:       Appearance: He is well-developed. He is ill-appearing.   HENT:      Head: Normocephalic and atraumatic.   Eyes:      Conjunctiva/sclera: Conjunctivae normal.   Neck:      Comments: Tracheostomy tube present  Cardiovascular:      Rate and Rhythm: Regular rhythm. Bradycardia present.      Pulses: Normal pulses.      Heart sounds: Normal heart sounds. No murmur heard.  Pulmonary:      Comments: Ventilator assisted breath sounds bilaterally  Abdominal:      Palpations: Abdomen is soft.      Tenderness: There is no abdominal tenderness.   Musculoskeletal:         General: No swelling.      Cervical back: Neck supple.      Right lower leg: No edema.      Left lower leg: No edema.   Skin:     General: Skin is warm and dry.      Capillary Refill: Capillary refill takes less than 2 seconds.   Psychiatric:         Mood and Affect: Mood normal.         Medications:    Current Facility-Administered Medications:     acetaminophen (TYLENOL) oral suspension 650 mg, 650 mg, Per PEG Tube, Q6H PRN, Ne Low MD    acetylcysteine (MUCOMYST) 200 mg/mL inhalation solution 600 mg, 3 mL,  Nebulization, Q6H, Ne Low MD, 600 mg at 10/11/24 0744    apixaban (ELIQUIS) tablet 5 mg, 5 mg, Per PEG Tube, BID, Mohsen Moon MD, 5 mg at 10/11/24 0920    [COMPLETED] bisacodyl (DULCOLAX) rectal suppository 10 mg, 10 mg, Rectal, Once, 10 mg at 10/09/24 1153 **FOLLOWED BY** bisacodyl (DULCOLAX) rectal suppository 10 mg, 10 mg, Rectal, Daily PRN, Ne Low MD    chlorhexidine (PERIDEX) 0.12 % oral rinse 15 mL, 15 mL, Mouth/Throat, Q12H ISAEL, Mohsen Moon MD, 15 mL at 10/11/24 0920    dextrose 5 % infusion, 100 mL/hr, Intravenous, Continuous, AMBER Tucker, Last Rate: 100 mL/hr at 10/10/24 1000, 100 mL/hr at 10/10/24 1000    DOPamine (INTROPIN) 400 mg in 250 mL infusion (premix), 5-20 mcg/kg/min, Intravenous, Titrated, Bhupinder Angela MD, Stopped at 10/10/24 0845    fentaNYL injection 50 mcg, 50 mcg, Intravenous, Q2H PRN, AMBER Tucker, 50 mcg at 10/11/24 0438    [START ON 10/12/2024] fludrocortisone (FLORINEF) tablet 0.05 mg, 0.05 mg, Per PEG Tube, Daily, Ne Low MD    haloperidol (HALDOL) oral concentrated solution 2 mg, 2 mg, Oral, BID, Ne Low MD    hydrocortisone (Solu-CORTEF) injection 50 mg, 50 mg, Intravenous, Q6H ISAEL, Ne Low MD, 50 mg at 10/11/24 0509    levalbuterol (XOPENEX) inhalation solution 1.25 mg, 1.25 mg, Nebulization, Q6H, Silvio Alas MD, 1.25 mg at 10/11/24 0744    NOREPINEPHRINE 4 MG  ML NSS (CMPD ORDER) infusion, 1-30 mcg/min, Intravenous, Titrated, Silvio Alas MD, Last Rate: 7.5 mL/hr at 10/11/24 0300, 2 mcg/min at 10/11/24 0300    [COMPLETED] piperacillin-tazobactam (ZOSYN) 4.5 g in sodium chloride 0.9 % 100 mL IV LOADING DOSE, 4.5 g, Intravenous, Once, Last Rate: 200 mL/hr at 10/09/24 1200, 4.5 g at 10/09/24 1200 **FOLLOWED BY** piperacillin-tazobactam (ZOSYN) 4.5 g in sodium chloride 0.9 % 100 mL IVPB (EXTENDED INFUSION), 4.5 g, Intravenous,  Q8H, Ne Low MD, Last Rate: 25 mL/hr at 10/11/24 0900, 4.5 g at 10/11/24 0900    polyethylene glycol (MIRALAX) packet 17 g, 17 g, Per PEG Tube, Daily PRN, Ne Low MD    potassium chloride 40 mEq IVPB (premix), 40 mEq, Intravenous, Once, Dominik Raman MD, Last Rate: 25 mL/hr at 10/11/24 0900, 40 mEq at 10/11/24 0900    senna-docusate sodium (SENOKOT S) 8.6-50 mg per tablet 1 tablet, 1 tablet, Per PEG Tube, BID, Ne Low MD    Laboratory Results:  Results from last 7 days   Lab Units 10/11/24  0505 10/11/24  0014 10/10/24  1806 10/10/24  1032 10/10/24  0416 10/10/24  0415 10/10/24  0408 10/09/24  2344 10/09/24  1739 10/09/24  1034 10/09/24  0421 10/08/24  0746 10/08/24  0344 10/07/24  2347 10/07/24  2107 10/07/24  1749 10/07/24  1435 10/07/24  0958 10/07/24  0422 10/06/24  0810 10/06/24  0425 10/05/24  2312 10/05/24  2311   WBC Thousand/uL 11.51*  --   --   --  16.43*  --   --   --   --   --  13.16*  --  8.40  --   --   --   --   --  10.55*  --  15.56*  --  15.66*   HEMOGLOBIN g/dL 12.1  --   --   --  14.4  --   --   --   --   --  12.7  --  11.2*  --   --   --   --   --  12.1  --  14.3  --  14.4   HEMATOCRIT % 39.1  --   --   --  46.3  --   --   --   --   --  43.2  --  37.6  --   --   --   --   --  40.1  --  48.2  --  48.5   PLATELETS Thousands/uL 169  --   --   --  182  --   --   --   --   --  188  --  146*  --   --   --   --   --  167  --  177  --  178   POTASSIUM mmol/L 3.4* 3.8 3.9 4.9  --   --  3.7 4.0 4.1   < > 3.7   < > 2.9*  --    < > 3.9 4.0   < > 3.4*   < > 3.7   < >  --    CHLORIDE mmol/L 108 107 109* 112*  --   --  110* 113* 118*   < > 122*   < > 124*  --    < > 121* 123*   < > 120*   < > 126*   < >  --    CO2 mmol/L 30 31 31 34*  --   --  31 33* 34*   < > 31   < > 28  --    < > 31 30   < > 30   < > 35*   < >  --    BUN mg/dL 7 8 7 7  --   --  6 6 6   < > 6   < > 5  --    < > 6 6   < > 6   < > 9   < >  --    CREATININE mg/dL 0.69 0.71 0.73 0.84  --    "--  0.74 0.76 0.76   < > 0.74   < > 0.61  --    < > 0.77 0.74   < > 0.58*   < > 0.82   < >  --    CALCIUM mg/dL 7.9* 8.3* 8.2* 8.5  --   --  8.8 9.0 8.8   < > 8.6   < > 7.5*  --    < > 8.9 8.6   < > 8.3*   < > 8.2*   < >  --    MAGNESIUM mg/dL 2.3  --   --   --   --  2.5  --   --   --   --  2.6  --   --  2.4  --  2.4 2.3  --  2.2   < > 2.5   < >  --    PHOSPHORUS mg/dL 3.2  --   --   --   --  3.0  --   --   --   --  3.1  --   --  3.1  --  2.4* 1.8*  --  1.7*   < > 2.3*   < >  --     < > = values in this interval not displayed.       Portions of the record may have been created with voice recognition software. Occasional wrong word or \"sound a like\" substitutions may have occurred due to the inherent limitations of voice recognition software. Read the chart carefully and recognize, using context, where substitutions have occurred. If you have any questions, please contact the dictating provider.    "

## 2024-10-11 NOTE — ASSESSMENT & PLAN NOTE
- Noted with HR dropping as low as 30-40s  - Received atropine on 10/8 for episode of arrest/pause and on 10/9 for bradycardia  - Cardiology following; appreciate recommendations  No recommendation for PPM at this time  Suspected to be in the setting of vasovagal/agitation  - Telemetry  - Medical management per ICU and Cardiology

## 2024-10-11 NOTE — RESPIRATORY THERAPY NOTE
10/11/24 7580   Respiratory Assessment   Resp Comments [place] alarms for pt from nurses station. Upon entering the room Dr Sutherland was baging pt. He was clamped down again and Desaturating. Once pt sat came back up I placed back on Vent.

## 2024-10-11 NOTE — PHYSICAL THERAPY NOTE
Physical Therapy Cancellation Note       10/11/24 1041   Note Type   Note type Cancelled Session   Cancel Reasons Medical status   Additional Comments PT consult received and chart reviewed. Per ICU mobility rounds pt is not appropriate for participation in PT on this date. Will hold and f/u as medically appropriate.       Namrata Blas, PT, DPT   Available via [email or web address]  NPI # [id number]  [place] License - [id number]  10/11/2024

## 2024-10-11 NOTE — RESPIRATORY THERAPY NOTE
10/11/24 0842   Respiratory Assessment   Resp Comments Pt went in and out of eposides of [place] and bearing down . Had to give manual breaths several times till pt relaxed and stopped breathing down.

## 2024-10-11 NOTE — RESPIRATORY THERAPY NOTE
10/11/24 1600   Respiratory Assessment   Resp Comments RN asked if I could help with changing the sheets and cleaning the pt. Wanted me to be on standby in case pt had another episode. During the process of cleaning the pt after changing his sheets he started bearing down , desating and became diaphoretic. Manually ventilated the pt till he relaxed and his sats came up. RN also administered PRN meds

## 2024-10-11 NOTE — ASSESSMENT & PLAN NOTE
Patient with autonomic dysregulation s/p trach and PEG now admitted to the ICU with suspected sepsis and unknown source; general surgery consulted to evaluate known chronically incarcerated umbilical hernia.  CT with p.o. contrast reveals no evidence of small bowel in the hernia sac, evidence of obstruction, or inflammatory changes within the incarcerated fat.  On exam the patients abdomen is soft, and there are no skin changes or warmth overlying the hernia sac; the patient does withdraw/localize when you palpate any aspect of the abdomen including the hernia.  - No acute surgical invention as this patient has a chronically incarcerated hernia with no bowel involvement  - This patient would not be a candidate for elective repair given his comorbidities

## 2024-10-11 NOTE — ASSESSMENT & PLAN NOTE
Fever, tachycardia, leukocytosis present on arrival to ED  CXR revealed left sided opacification. Bronchoscopic culture revealed proteus and pseudomonas  On Zosyn  Mgmt per primary team

## 2024-10-11 NOTE — ASSESSMENT & PLAN NOTE
Unexplained respiratory failure s/p PEG/Trach/vent dependent with concern of underlying neuromuscular disease  Hx of PE on Eliquis  Requiring 24/7 ventilator support.   Mgmt per primary team

## 2024-10-11 NOTE — ASSESSMENT & PLAN NOTE
Noted to have seizure-like activity in ICU on 10/09  EEG negative for seizures though symptoms persist  Neurology team consulted  Mgmt per primary team/neurology.

## 2024-10-11 NOTE — CONSULTS
Consultation - Surgery-General   Name: Hemanth Swanson 39 y.o. male I MRN: [id number]  Unit/Bed#: ICU 05 I Date of Admission: 10/5/2024   Date of Service: 10/11/2024 I Hospital Day: 6   Inpatient consult to Acute Care Surgery  Consult performed by: Joel Roy MD  Consult ordered by: Ne Low MD        Physician Requesting Evaluation: John Sutherland MD   Reason for Evaluation / Principal Problem: Umbilical Hernia    Assessment & Plan  Seizure-like activity (HCC)    Chronic respiratory failure with hypoxia and hypercapnia (HCC)    Sepsis (HCC)    Bradycardia    Hypernatremia    Toxic metabolic encephalopathy    Palliative care by specialist    Goals of care, counseling/discussion    Umbilical hernia without obstruction and without gangrene  Patient with autonomic dysregulation s/p trach and PEG now admitted to the ICU with suspected sepsis and unknown source; general surgery consulted to evaluate known chronically incarcerated umbilical hernia.  CT with p.o. contrast reveals no evidence of small bowel in the hernia sac, evidence of obstruction, or inflammatory changes within the incarcerated fat.  On exam the patients abdomen is soft, and there are no skin changes or warmth overlying the hernia sac; the patient does withdraw/localize when you palpate any aspect of the abdomen including the hernia.  - No acute surgical invention as this patient has a chronically incarcerated hernia with no bowel involvement  - This patient would not be a candidate for elective repair given his comorbidities      History of Present Illness   Hemanth Swanson is a 39 y.o. male admitted to the ICU for hypotension in the setting of autonomic dysregulation.  There is concern for ongoing sepsis with concern for strangulated hernia for which general surgery was consulted.  Given the patient's mental status and HPI was unable to be performed.    Review of Systems  Limited due to patient's mental  status    Objective :  Temp:  [96.8 °F (36 °C)-98.4 °F (36.9 °C)] 96.8 °F (36 °C)  HR:  [] 49  BP: ()/(43-94) 98/66  Resp:  [18-21] 21  SpO2:  [56 %-100 %] 100 %  O2 Device: Ventilator  FiO2 (%):  [40] 40    Lines/Drains/Airways       Active Status       Name Placement date Placement time Site Days    Port A Cath 03/14/23 Right Subclavian 03/14/23  --  Subclavian  577    Surgical Airway Distal;Cuffed;Other (Comment) 06/01/24  1155  --  132    Gastrostomy/Enterostomy Percutaneous Interventional Gastrostomy 18 Fr. LUQ 06/18/24  1520  LUQ  115    External Urinary Catheter Small 10/10/24  1810  -- less than 1                  Physical Exam  General: Minimally arousable  Skin: Warm, dry, anicteric  HEENT: Tracheostomy in place  CV: Bradycardic  Pulm: Mechanically ventilated  Abd: Soft, patient withdraws and localizes throughout the entirety of the abdomen when palpated, no guarding, nondistended; G-tube in place.  Nonreducible umbilical hernia; no skin changes or warmth, similar response to palpation of the rest of the abdomen.  Neuro: AOx3, GCS 15     Lab Results: I have reviewed the following results:  Recent Labs     10/10/24  0415 10/10/24  0416 10/10/24  1219 10/10/24  1806 10/11/24  0505   WBC  --    < >  --   --  11.51*   HGB  --    < >  --   --  12.1   HCT  --    < >  --   --  39.1   PLT  --    < >  --   --  169   SODIUM  --    < >  --    < > 144   K  --    < >  --    < > 3.4*   CL  --    < >  --    < > 108   CO2  --    < >  --    < > 30   BUN  --    < >  --    < > 7   CREATININE  --    < >  --    < > 0.69   GLUC  --    < >  --    < > 109   CAIONIZED 1.12  --   --   --   --    MG 2.5  --   --   --  2.3   PHOS 3.0  --   --   --  3.2   LACTICACID  --    < > 1.5  --   --     < > = values in this interval not displayed.

## 2024-10-11 NOTE — ASSESSMENT & PLAN NOTE
- Noted to be febrile, tachycardic, with elevated WBC/lactic acid on presentation  - Initial CXR: Complete opacification of the left hemithorax with mediastinal shift to the left indicating at least in part atelectasis.   - Initial labs: lactic acid 12.1, WBC 14.76, COVID/flu/RSV negative  - Blood cultures negative  - UA negative  - Sputum culture positive for Proteus mirabilis, Pseudomonas aeruginosa  - Currently on Zosyn  - Medical management per ICU

## 2024-10-11 NOTE — ASSESSMENT & PLAN NOTE
Pertinent palliative diagnoses include: Testicular cancer s.p left orchiectomy/chemo, Chronic respiratory failure s/p Trach/vent dependent/PEG tube placement, neuromuscular syndrome, hx of prior PE on Eliquis, Toxic metabolic encephalopathy, hx of cardiac arrest, anxiety, depression    Social support  Patient is supported by mother, sister  Supportive listening provided  Normalized experience of patient/family  Provided anxiety containment  Provided anticipatory guidance    Follow up  Palliative Care will continue to follow. PSC will return Monday, 10/14/23.  Please reach out to on-call provider via Epic Secure Chat  if questions or concerns arise.  Please do not hesitate to reach our on call provider through our clinic answering service at [phone] should you have acute symptom control concerns.

## 2024-10-11 NOTE — ASSESSMENT & PLAN NOTE
Hypernatremia with hyperchloremia, likely in the setting of dehydration.  Sodium level had been difficult to control.  Now in the mid 140s    Plan:  Continue 200 cc flushes and 100 cc/hr maintenance.  Labs daily now.

## 2024-10-11 NOTE — PROGRESS NOTES
General Cardiology   Progress Note -  [facility] One   Hemanthcapri Bishopgwendolyn 39 y.o. male MRN: [id number]    Unit/Bed#: ICU 05 Encounter: [id number]             Assessment  1.  Bradycardia  -Baseline resting heart rates per prior telemetry/ECG review appear to be in the 50s-60s.  -Episodes of marked bradycardia/sinus pauses occurring predominately w/vagal maneuvers (bearing down, coughing/mucus plugging) w/hypoxia at times. Also component of anxiety/agitation when stimulated.   -Telemetry review 10/10-10/11 - NSR at rest while awake (HRs 60s-90s), HRs occasionally dropping down into the 40s overnight while sleeping. No significant bradycardia noted, no high grade AVB or pauses.   -Telemetry review 10/9-10/10 - NSR - ST w/rates variable in the 70's to 120's. HR currently in the 120s. No high grade AVB or pauses noted.   -Telemetry review  10/8-10/9-predominant sinus bradycardia/NSR with heart rates in the 50s to 60s.  Heart rates down into the upper 30s to 40s overnight while sleeping.  No high grade AVB or pauses noted.   -Telemetry review 10/7-10/8; 10-second sinus arrest with no escape complexes --Per RN staff no clear precipitating events prior to this (not visibly agitated, no coughing), RN was unclear if there was hypoxia during this event.   -Pressor/inotrope requirements: IV dopamine -- weaned off as of 10/10, currently requiring IV levophed for BP support.   2. Sepsis  3. PNA  -Management per the CC service.  -Procalcitonin 0.14--0.08  -Afebrile currently, leukocytosis improving since admission.  -On IV antibiotics.  3. Acute on chronic respiratory failure, ventilator dependent.  -Chronic respiratory failure - secondary to neuromuscular disease.  Acute respiratory failure secondary to mucous plugging/PNA).   -Management per the CC service.  4. Chronic HFpEF  -Appears euvolemic on exam.  -BNP level 11.  -TTE 6/11/2024; LVEF 65%, wall motion normal, RV dilated/RV systolic function is normal, LA/RA normal in size, no  significant valvular disease.  -Outpatient diuretic regimen; none.  -Inpatient diuretic regimen; none.  5. Testicular cancer  -S/p right orchiectomy 12/2022  -Received cisplatin/etoposide - currently on HOLD  6. Obesity; BMI 26.5     Plan  -Suspect the primary driving factor for his transient episodes of bradycardia / pauses is  vasovagal mediated. At times appears generally uncomfortable/anxious/mildly agitated. He also has had issues w/cough/mucus plugging in which his face turns red, becomes diaphoretic, and hypoxic. Neurology has also ordered EEG monitoring to assess for any seizure activity that could be contributory.   -IV dopamine has been weaned off as of 10/10. Overall heart rates are stable. Some occasional bouts of sinus bradycardia (which are not hemodynamically significant) occurring mainly during bedtime/early a.m hours, no high grade avb or pauses noted.   -At this time there is no acute indication for a PPM.   -Continue to optimize underlying lung/infectious processes (PNA/mucus plugging) and correction of electrolytes disturbances. Appears to be an anxiety/agitation component to this as well; would limit sedation and try low dose anti anxiolytics if needed. Try to avoid any medical therapies that can contribute to bradycardia. Can utilize atropine if brooke and becomes hemodynamically unstable or utilize IV dopamine for situations of sustained / marked bradycardia. He is currently on IV dopamine 10 mcg/kg/min w/HRs in the 120's and mildly hypertensive. Wean down IV dopamine - as long as BP is stable even if mildly bradycardic, this would be acceptable.   -Sepsis/PNA management per the CC service.  -Needs aggressive pulmonary hygiene.  -Monitor electrolytes closely.  Replete to maintain K+ level of 4.0/magnesium level at 2.0.  -Continue to monitor closely on telemetry.         Subjective  Review of Systems   Unable to perform ROS: Patient nonverbal       Objective:   Physical Exam  Vitals reviewed.  "  Constitutional:       Appearance: He is diaphoretic.   HENT:      Head: Normocephalic and atraumatic.   Eyes:      General: No scleral icterus.  Cardiovascular:      Rate and Rhythm: Normal rate.      Pulses: Normal pulses.      Heart sounds: Normal heart sounds.   Abdominal:      Palpations: Abdomen is soft.      Tenderness: There is no abdominal tenderness.   Musculoskeletal:      Right lower leg: No edema.      Left lower leg: No edema.   Skin:     General: Skin is warm.   Neurological:      Mental Status: He is alert.   Psychiatric:      Comments: Anxious/agitated         Vitals: Blood pressure 98/66, pulse (!) 49, temperature (!) 96.8 °F (36 °C), temperature source Axillary, resp. rate 18, height 6' 4\" (1.93 m), weight 96.7 kg (213 lb 3 oz), SpO2 100%.,     Body mass index is 25.95 kg/m².,   Systolic (24hrs), Av , Min:61 , Max:185     Diastolic (24hrs), Av, Min:41, Max:94      Intake/Output Summary (Last 24 hours) at 10/11/2024 0850  Last data filed at 10/11/2024 0600  Gross per 24 hour   Intake 4929.41 ml   Output 300 ml   Net 4629.41 ml     Weight (last 2 days)       Date/Time Weight    10/10/24 0212 96.7 (213.19)    10/09/24 0245 99 (218.26)            LABORATORY RESULTS      CBC with diff:   Results from last 7 days   Lab Units 10/11/24  0505 10/10/24  0416 10/09/24  0421 10/08/24  0344 10/07/24  0422 10/06/24  0425 10/05/24  2311 10/05/24  1934   WBC Thousand/uL 11.51* 16.43* 13.16* 8.40 10.55* 15.56* 15.66* 14.76*   HEMOGLOBIN g/dL 12.1 14.4 12.7 11.2* 12.1 14.3 14.4 18.2*   HEMATOCRIT % 39.1 46.3 43.2 37.6 40.1 48.2 48.5 63.2*   MCV fL 98 100* 103* 103* 101* 103* 103* 107*   PLATELETS Thousands/uL 169 182 188 146* 167 177 178 228   RBC Million/uL 3.98 4.63 4.21 3.64* 3.96 4.66 4.72 5.91*   MCH pg 30.4 31.1 30.2 30.8 30.6 30.7 30.5 30.8   MCHC g/dL 30.9* 31.1* 29.4* 29.8* 30.2* 29.7* 29.7* 28.8*   RDW % 17.8* 18.7* 18.7* 18.3* 18.3* 18.8* 18.6* 19.1*   MPV fL 10.7 10.1 10.9 10.9 10.4 10.2 10.6 " "10.4   NRBC AUTO /100 WBCs  --   --  0 0  --  0 0 0       CMP:  Results from last 7 days   Lab Units 10/11/24  0505 10/11/24  0014 10/10/24  1806 10/10/24  1032 10/10/24  0408 10/09/24  2344 10/09/24  1739 10/08/24  0746 10/08/24  0344 10/06/24  0148 10/05/24  2312 10/05/24  1934   POTASSIUM mmol/L 3.4* 3.8 3.9 4.9 3.7 4.0 4.1   < > 2.9*   < > 3.4* 3.3*   CHLORIDE mmol/L 108 107 109* 112* 110* 113* 118*   < > 124*   < > 125* 116*   CO2 mmol/L 30 31 31 34* 31 33* 34*   < > 28   < > 33* 38*   BUN mg/dL 7 8 7 7 6 6 6   < > 5   < > 11 13   CREATININE mg/dL 0.69 0.71 0.73 0.84 0.74 0.76 0.76   < > 0.61   < > 0.84 1.10   CALCIUM mg/dL 7.9* 8.3* 8.2* 8.5 8.8 9.0 8.8   < > 7.5*   < > 8.0* 10.2   AST U/L  --   --   --   --   --   --   --   --  12*  --  14 20   ALT U/L  --   --   --   --   --   --   --   --  11  --  19 27   ALK PHOS U/L  --   --   --   --   --   --   --   --  72  --  78 116*   EGFR ml/min/1.73sq m 121 119 118 111 117 116 116   < > 127   < > 111 85    < > = values in this interval not displayed.       BMP:  Results from last 7 days   Lab Units 10/11/24  0505 10/11/24  0014 10/10/24  1806 10/10/24  1032 10/10/24  0408 10/09/24  2344 10/09/24  1739   POTASSIUM mmol/L 3.4* 3.8 3.9 4.9 3.7 4.0 4.1   CHLORIDE mmol/L 108 107 109* 112* 110* 113* 118*   CO2 mmol/L 30 31 31 34* 31 33* 34*   BUN mg/dL 7 8 7 7 6 6 6   CREATININE mg/dL 0.69 0.71 0.73 0.84 0.74 0.76 0.76   CALCIUM mg/dL 7.9* 8.3* 8.2* 8.5 8.8 9.0 8.8       No results found for: \"NTBNP\"     Results from last 7 days   Lab Units 10/11/24  0505 10/10/24  0415 10/09/24  0421 10/07/24  2347 10/07/24  1749 10/07/24  1435 10/07/24  0422   MAGNESIUM mg/dL 2.3 2.5 2.6 2.4 2.4 2.3 2.2             Results from last 7 days   Lab Units 10/08/24  0746   TSH 3RD GENERATON uIU/mL 3.179       Results from last 7 days   Lab Units 10/08/24  0344 10/05/24  2020   INR  1.46* 1.63*       Lipid Profile:   No results found for: \"CHOL\"  Lab Results   Component Value Date    HDL 45 " 12/10/2022     Lab Results   Component Value Date    LDLCALC 82 12/10/2022     Lab Results   Component Value Date    TRIG 280 (H) 12/10/2022       Cardiac testing:   No results found for this or any previous visit.    No results found for this or any previous visit.    No results found for this or any previous visit.    No valid procedures specified.  No results found for this or any previous visit.      Meds/Allergies   all current active meds have been reviewed and current meds:   Current Facility-Administered Medications:     acetaminophen (TYLENOL) oral suspension 650 mg, Q6H PRN    acetylcysteine (MUCOMYST) 200 mg/mL inhalation solution 600 mg, Q6H    apixaban (ELIQUIS) tablet 5 mg, BID    [COMPLETED] bisacodyl (DULCOLAX) rectal suppository 10 mg, Once **FOLLOWED BY** bisacodyl (DULCOLAX) rectal suppository 10 mg, Daily PRN    chlorhexidine (PERIDEX) 0.12 % oral rinse 15 mL, Q12H ISAEL    dextrose 5 % infusion, Continuous, Last Rate: 100 mL/hr (10/10/24 1000)    DOPamine (INTROPIN) 400 mg in 250 mL infusion (premix), Titrated, Last Rate: Stopped (10/10/24 0845)    fentaNYL injection 50 mcg, Q2H PRN    fludrocortisone (FLORINEF) tablet 0.05 mg, Daily    hydrocortisone (Solu-CORTEF) injection 50 mg, Q6H ISAEL    levalbuterol (XOPENEX) inhalation solution 1.25 mg, Q6H    NOREPINEPHRINE 4 MG  ML NSS (CMPD ORDER) infusion, Titrated, Last Rate: 2 mcg/min (10/11/24 0300)    [COMPLETED] piperacillin-tazobactam (ZOSYN) 4.5 g in sodium chloride 0.9 % 100 mL IV LOADING DOSE, Once, Last Rate: 4.5 g (10/09/24 1200) **FOLLOWED BY** piperacillin-tazobactam (ZOSYN) 4.5 g in sodium chloride 0.9 % 100 mL IVPB (EXTENDED INFUSION), Q8H, Last Rate: 4.5 g (10/11/24 0037)    polyethylene glycol (MIRALAX) packet 17 g, Daily PRN    potassium chloride 40 mEq IVPB (premix), Once    senna-docusate sodium (SENOKOT S) 8.6-50 mg per tablet 1 tablet, BID    Counseling / Coordination of Care  Total floor / unit time spent today 20 minutes.   Greater than 50% of total time was spent with the patient and / or family counseling and / or coordination of care.      ** Please Note: Dragon 360 Dictation voice to text software may have been used in the creation of this document. **

## 2024-10-11 NOTE — PROGRESS NOTES
Critical Care Attending Note; John Sutherland   Note Date: 10/11/24  Note Time: 9:19 AM    Patient: Hemanth Swanson  Age, : 39 y.o., 1985 MRN: [id number] Code Status: Level 1 - Full Code Patient Location: ICU 05/ICU 05   Hospital LOS:6 days  ]   Patient seen and examined, medical record reviewed, discussed with house staff and nursing staff.     HPI   CC: Hypernatremia  39M with a PMH Seminoma(Orcetomy/Chemo), HTN, HFpEF, DM, Cardiac Arrest(Hypoxic - ), Demyelinating Neuromuscular Disorder(Trach/Peg),   Who presents after removing peg tube found to have aspiration and hypernatremia.     Key Recent Events   10/5: Admitted  10/6: Bronch  10/8: Atropine Given X1 - Sinus Bradycardia, normotensive  10/9: Seizure like activity, Atropine   Main ICU Plans:       #Neuro  AMS/ Demyelinating Neuromuscular disorder - Baseline declining over time- Improving mental staus but with episodes of apnea, bearing down. Unclear etiology? We have optimized the patient for a respiratory and bowel status but episodes continue.   - Neurology Consulted - Appreciate recs - EEG Negative  - Fentanyl PRN   - Haldol 2mg     #Card  VT/Bradycardia - Recurrent Vagal? - Previously described due to mucous plugging and hypoxia.Possible neuro reflex vs mucous plugging. Patient having episodes of bearing down and apnea. Plan to optimize bowel regimen to prevent strain  - Cardiology Consult -Appreciate recs  - K>4 Mg> 2    HFpEF   - Euvolemic      Hypotension - Component of vaso plegia vs autonomic insufficiency - Minimal improvement   - Levophed - MAP > 65mmHg  - Trial Stress dose steroids - Hydrocortisone/Florinef      #Pulm  Acute Respiratory Failure - Secondary to mucous plug/Pneumonia - Trach  - LTVV VC - 500cc  - Wean FiO2 - Maintain Oxygen Sat >92%  - VAP Bundle  - HOB > 30o    - PEEP Titrate  - Trach care  - Xopenex/Chest PT/Mucomyst- CXR 10/9    Pulmonary Embolism   - Eliquis     #Renal  Hypernatremia - Resolved  - Nephrology  Consult - Appreciate recs  - FWF, D5W  - BMP qday    #GI  Bowel Regimen - Mirilax/Enema    #ID   Pnemonia - Likely Aspiration - Growing proteus, pseudomonas  - Zosyn 7 days      #Endo  DM  - ISS      #DVT/GI ppx  Apixaban    #Lines/Tubes/Drains:   Invasive Devices       Central Venous Catheter Line  Duration             Port A Cath 03/14/23 Right Subclavian 577 days              Peripheral Intravenous Line  Duration             Peripheral IV 10/08/24 Right;Ventral (anterior) Forearm 3 days    Peripheral IV 10/10/24 Left;Ventral (anterior) Forearm 1 day              Drain  Duration             Gastrostomy/Enterostomy Percutaneous Interventional Gastrostomy 18 Fr.  days    External Urinary Catheter Small <1 day              Airway  Duration             Surgical Airway Distal;Cuffed;Other (Comment) 131 days                    #Nutrition:   Diet Enteral/Parenteral; Tube Feeding No Oral Diet; Jevity 1.5; Continuous; 20; 200; Water; Every 4 hours        #Code Status:   Level 1 - Full Code    #Dispo:   ICU        John Sutherland MD  Pulmonary, Critical Care    Critical care time, excluding procedures, teaching, family meetings, and excludes any prior time recorded by the AP/resident, 35 minutes. Upon my evaluation, this patient has a high probability of imminent or life-threatening deterioration due to above problems which required my direct attention, intervention, and personal management.   Impression/Active Problems:    HTN  HFpEF  DM   Neuromuscualr disorder   Tracheostomy   PEG   Hypernatremia   Aspiration   Vaso vagal    Ventricular Tachycardia   Bradycardia    Physical Exam:     Vital Signs:   Weight: 96.7 kg (213 lb 3 oz)  IBW: Ideal body weight: 86.8 kg (191 lb 5.7 oz)  Adjusted ideal body weight: 90.8 kg (200 lb 1.4 oz)  Temp:  [96.8 °F (36 °C)-98.4 °F (36.9 °C)] 96.8 °F (36 °C)  HR:  [] 49  BP: ()/(43-94) 98/66  Resp:  [18] 18  SpO2:  [56 %-100 %] 100 %  O2 Device: Ventilator  FiO2 (%):   [40] 40  Physical Exam:   General: NAD  Neuro: Alert, intermittently following commands  Heart: RRR  Lungs: Diminished Bases  Abdomen: Soft ND  Extremities: Min edema                Ventilator Settings:     Vent Mode: AC/VC  FiO2 (%):  [40] 40    Results from last 7 days   Lab Units 10/11/24  0204 10/10/24  1106 10/10/24  0928   PO2 SYLVESTER mm Hg 40.7 33.6* 55.5*     Radiologic Images Reviewed:   MRI 6/16  No acute intracranial pathology.  Stable mild nonspecific white matter changes likely due to chronic microangiopathy.    CXR   White Out Left     CT head  No acute intracranial abnormality. s  Input / Output:     Intake/Output Summary (Last 24 hours) at 10/11/2024 0919  Last data filed at 10/11/2024 0600  Gross per 24 hour   Intake 4929.41 ml   Output 300 ml   Net 4629.41 ml            Infusions:  dextrose, 100 mL/hr, Last Rate: 100 mL/hr (10/10/24 1000)  DOPamine in dextrose 5%, 5-20 mcg/kg/min, Last Rate: Stopped (10/10/24 0845)  norepinephrine, 1-30 mcg/min, Last Rate: 2 mcg/min (10/11/24 0300)      Scheduled Medications:  Current Facility-Administered Medications   Medication Dose Route Frequency Provider Last Rate    acetaminophen  650 mg Oral Q6H PRN Mohsen Moon MD      acetylcysteine  3 mL Nebulization Q6H Ne Low MD      apixaban  5 mg Per PEG Tube BID Mohsen Moon MD      bisacodyl  10 mg Rectal Daily PRN Ne Low MD      chlorhexidine  15 mL Mouth/Throat Q12H [facility] Mohsen Moon MD      dextrose  100 mL/hr Intravenous Continuous AMBER Tucker 100 mL/hr (10/10/24 1000)    DOPamine in dextrose 5%  5-20 mcg/kg/min Intravenous Titrated Bhupinder Angela MD Stopped (10/10/24 0845)    fentaNYL  50 mcg Intravenous Q2H PRN AMBER Tucker      fludrocortisone  0.05 mg Oral Daily Ne Low MD      hydrocortisone sodium succinate  50 mg Intravenous Q6H [facility] Ne Low MD      levalbuterol  1.25 mg Nebulization Q6H Silvio Judge  "MD Evette      norepinephrine  1-30 mcg/min Intravenous Titrated Silvio Alas MD 2 mcg/min (10/11/24 0300)    piperacillin-tazobactam  4.5 g Intravenous Q8H Ne Low MD 4.5 g (10/11/24 0037)    polyethylene glycol  17 g Per PEG Tube Daily PRN Ne Low MD      potassium chloride  40 mEq Intravenous Once Dominik Raman MD      senna-docusate sodium  1 tablet Oral BID Ne Low MD         PRN Medications:    acetaminophen    [COMPLETED] bisacodyl **FOLLOWED BY** bisacodyl    fentaNYL    polyethylene glycol    Labs Reviewed:  Results from last 7 days   Lab Units 10/11/24  0505 10/10/24  0416 10/09/24  0421   WBC Thousand/uL 11.51* 16.43* 13.16*   HEMOGLOBIN g/dL 12.1 14.4 12.7   HEMATOCRIT % 39.1 46.3 43.2   PLATELETS Thousands/uL 169 182 188      Results from last 7 days   Lab Units 10/11/24  0505 10/11/24  0014 10/10/24  1806 10/10/24  1032 10/10/24  0415 10/09/24  1034 10/09/24  0421   SODIUM mmol/L 144 145 146   < >  --    < > 158*   CO2 mmol/L 30 31 31   < >  --    < > 31   BUN mg/dL 7 8 7   < >  --    < > 6   CALCIUM mg/dL 7.9* 8.3* 8.2*   < >  --    < > 8.6   MAGNESIUM mg/dL 2.3  --   --   --  2.5  --  2.6   PHOSPHORUS mg/dL 3.2  --   --   --  3.0  --  3.1    < > = values in this interval not displayed.         Invalid input(s): \"ASTSGOT\", \"ALTSGPT\"LABRCNTIP@ ,alkphos:3,tbilirubin:3,dbilirubin:3)@  Results from last 7 days   Lab Units 10/08/24  0344 10/05/24  2020   INR  1.46* 1.63*           Invalid input(s): \"TROPT\", \"PBNP\"             I have personally seen and examined the patient on (10/11/24 between [id number]). I discussed the patient with the AP/resident including, but not limited to, verifying findings; reviewing labs and x-rays; discussing with consultants; developing the plan of care with the bedside nurse; and discussing treatment plan with patient or surrogate.  I have reviewed the note and assessment performed by the AP/resident and " agree with the AP/resident’s documented findings and plan of care with the above additions/exceptions. Please see my comments for details and adjustments.

## 2024-10-11 NOTE — ASSESSMENT & PLAN NOTE
Repeat episode of seizure like activity this morning.  No evidence of seizure on EEG.  Plan to DC EEG today.

## 2024-10-11 NOTE — PROGRESS NOTES
Progress Note - Critical Care/ICU   Name: Hemanth Swanson 39 y.o. male I MRN: [id number]  Unit/Bed#: ICU 05 I Date of Admission: 10/5/2024   Date of Service: 10/11/2024 I Hospital Day: 6      Assessment & Plan  Hypernatremia  Hypernatremia with hyperchloremia, likely in the setting of dehydration.  Sodium level had been difficult to control.  Now in the mid 140s    Plan:  Continue 200 cc flushes and 100 cc/hr maintenance.  Labs daily now.  Bradycardia  Likely vagal response to coughing.  Spontaneously resolves without intervention.  Was initially started on dopamine with heart rate goal of 60.  Today had open heart rates up to 120s spontaneously dropping to 60s.  Dopamine has been off this morning.  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    Plan:  Continue hydrocortisone 50 mg every 6  Continue  fludrocortisone 0.05 mg daily.  Will continue to monitor.  Sepsis ([facility])  [facility] ED: febrile, tachycardic, with leukocytosis and a lactate of 12.  X-ray with left sided opacification, increased secretions from tracheostomy, initial pro killian negative. Received weight based fluids.    Transferred to [facility] for further management.  Repeat lab work upon arrival show persistent leukocytosis and hypernatremia.  Urine strep and Legionella negative  [facility] culture revealed Proteus and Pseudomonas    Plan:  Continue Zosyn  Continue vent support, frequent suctioning  Xopenex nebulizers  Chronic respiratory failure with hypoxia and hypercapnia ([facility])  Neuromuscular disease post chemotherapyS/P tracheostomy in 9/2023.  Vent dependent 24/7: current vent settings 14/500/6/60  Last bronchoscopy 6/1 with cultures growing serratia and pseudomonas.  Chest X-ray 10/5: Left sided opacification with possible mucus plugging.  CXR 10/7: Near complete opacification of the left hemithorax with leftward mediastinal shift, mildly improved from 10/5/2024, suggesting  baseline underlying atelectasis and pleural effusion.   Chest x-ray 10/9 seems improved from prior.    Plan:  Continue vent support  Xopnex nebs TID  Continue mucomyst  Continue CPT  Seizure-like activity (HCC)  Repeat episode of seizure like activity this morning.  No evidence of seizure on EEG.  Plan to DC EEG today.  Toxic metabolic encephalopathy  Likely related to hypoxia 2/2 atelectasis vs mucus plugging.  Continue to monitor for clinical response to Abx  Continue supportive care.  Disposition: Critical care    ICU Core Measures     Vented Patient  VAP Bundle  VAP bundle ordered     A: Assess, Prevent, and Manage Pain Has pain been assessed? Yes  Need for changes to pain regimen? No   B: Both Spontaneous Awakening Trials (SATs) and Spontaneous Breathing Trials (SBTs) Plan to perform spontaneous awakening trial today? Yes   Plan to perform spontaneous breathing trial today? N/A trach  Obvious barriers to extubation? Yes   C: Choice of Sedation RASS Goal: -1 Drowsy or 0 Alert and Calm  Need for changes to sedation or analgesia regimen? No   D: Delirium CAM-ICU: Negative   E: Early Mobility  Plan for early mobility? No   F: Family Engagement Plan for family engagement today? Yes       Antibiotic Review: Patient on appropriate coverage based on culture data.     Review of Invasive Devices:      Central access plan: Hemodynamic monitoring      Prophylaxis:  VTE VTE covered by:  apixaban, Per PEG Tube, 5 mg at 10/11/24 0920       Stress Ulcer  not ordered         24 Hour Events :   Had hypoxic events overnight.  Was manually bagged with improvement.  Intermittent pressor requirements up to 7 of levo.  Weaned to levo 2 this AM.    Subjective   Review of Systems: See HPI for Review of Systems    Objective :                   Vitals I/O      Most Recent Min/Max in 24hrs   Temp (!) 96.8 °F (36 °C) Temp  Min: 96.8 °F (36 °C)  Max: 98.4 °F (36.9 °C)   Pulse (!) 49 Pulse  Min: 43  Max: 108   Resp 18 Resp  Min: 18  Max: 18    BP 98/66 BP  Min: 65/43  Max: 185/86   O2 Sat 97 % SpO2  Min: 56 %  Max: 100 %      Intake/Output Summary (Last 24 hours) at 10/11/2024 1143  Last data filed at 10/11/2024 0600  Gross per 24 hour   Intake 3868.19 ml   Output 300 ml   Net 3568.19 ml       Diet Enteral/Parenteral; Tube Feeding No Oral Diet; Jevity 1.5; Continuous; 20; 200; Water; Every 4 hours    Invasive Monitoring           Physical Exam   Physical Exam  Vitals and nursing note reviewed.   Skin:     General: Skin is warm and dry.   HENT:      Head: Normocephalic and atraumatic.   Neck:   no midline tenderness Cardiovascular:      Rate and Rhythm: Normal rate.   Musculoskeletal:         General: No deformity or signs of injury.      Right lower leg: No edema.      Left lower leg: No edema.   Abdominal: General: Bowel sounds are normal. There is no distension.      Palpations: Abdomen is soft.   Constitutional:       General: He is not in acute distress.     Appearance: He is well-developed and well-nourished. He is ill-appearing.   Pulmonary:      Effort: No respiratory distress.      Comments: Trach/PEG dependent  Neurological:      Mental Status: He is unresponsive.          Diagnostic Studies        Lab Results: I have reviewed the following results:     Medications:  Scheduled PRN   acetylcysteine, 3 mL, Q6H  apixaban, 5 mg, BID  chlorhexidine, 15 mL, Q12H ISAEL  [START ON 10/12/2024] fludrocortisone, 0.05 mg, Daily  haloperidol, 2 mg, BID  hydrocortisone sodium succinate, 50 mg, Q6H ISAEL  levalbuterol, 1.25 mg, Q6H  piperacillin-tazobactam, 4.5 g, Q8H  potassium chloride, 40 mEq, Once  senna-docusate sodium, 1 tablet, BID      acetaminophen, 650 mg, Q6H PRN  bisacodyl, 10 mg, Daily PRN  fentaNYL, 50 mcg, Q2H PRN  polyethylene glycol, 17 g, Daily PRN       Continuous    dextrose, 100 mL/hr, Last Rate: 100 mL/hr (10/10/24 1000)  DOPamine in dextrose 5%, 5-20 mcg/kg/min, Last Rate: Stopped (10/10/24 0845)  norepinephrine, 1-30 mcg/min, Last Rate: 2  mcg/min (10/11/24 0300)         Labs:   CBC    Recent Labs     10/10/24  0416 10/11/24  0505   WBC 16.43* 11.51*   HGB 14.4 12.1   HCT 46.3 39.1    169     BMP    Recent Labs     10/11/24  0014 10/11/24  0505   SODIUM 145 144   K 3.8 3.4*    108   CO2 31 30   AGAP 7 6   BUN 8 7   CREATININE 0.71 0.69   CALCIUM 8.3* 7.9*       Coags    No recent results       Additional Electrolytes  Recent Labs     10/10/24  0415 10/11/24  0505   MG 2.5 2.3   PHOS 3.0 3.2   CAIONIZED 1.12  --           Blood Gas    No recent results  Recent Labs     10/11/24  0204   PHVEN 7.449*   GAY5LOU 42.7   PO2VEN 40.7   QZY6WDS 28.9   BEVEN 4.4   V8VSSOV 75.6      LFTs  No recent results      Infectious  Recent Labs     10/10/24  1032 10/11/24  0505   PROCALCITONI 0.81* 0.45*       Glucose  Recent Labs     10/10/24  1032 10/10/24  1806 10/11/24  0014 10/11/24  0505   GLUC 93 146* 109 109

## 2024-10-11 NOTE — CONSULTS
Consultation - Palliative Care   Name: Hemanth Swanson 39 y.o. male I MRN: [id number]  Unit/Bed#: ICU 05 I Date of Admission: 10/5/2024   Date of Service: 10/11/2024 I Hospital Day: 6   Inpatient consult to Palliative Care  Consult performed by: Andrea Christina PA-C  Consult ordered by: Dominik Raman MD        VIRTUAL CARE DOCUMENTATION:     1. This service was provided via Telemedicine using Other: phone discussion with POA/sister      2. Parties in the room with patient during teleconsult Other: video visit not performed     3. Confidentiality My office door was closed     4. Participants No one else was in the room    6. Time spent 40 min      Physician Requesting Evaluation: John Sutherland MD   Reason for Evaluation / Principal Problem: GOC / chronic respiratory failure, testicular cancer, neuromuscular disease, vent dependent    Assessment & Plan  Seizure-like activity (HCC)  Noted to have seizure-like activity in ICU on 10/09  EEG negative for seizures though symptoms persist  Neurology team consulted  Mgmt per primary team/neurology.   Chronic respiratory failure with hypoxia and hypercapnia (HCC)  Unexplained respiratory failure s/p PEG/Trach/vent dependent with concern of underlying neuromuscular disease  Hx of PE on Eliquis  Requiring 24/7 ventilator support.   Mgmt per primary team  Sepsis (HCC)  Fever, tachycardia, leukocytosis present on arrival to ED  CXR revealed left sided opacification. Bronchoscopic culture revealed proteus and pseudomonas  On Zosyn  Mgmt per primary team  Goals of care, counseling/discussion  Decisional apparatus:  Patient is not competent on exam today.  If competency is lost, patient's substitute decision maker would default to mother by [place] Act 169.  Advance Directive / Living Will / POLST / POA Forms: Has POA documents at home. Sister Dmitry Swanson is POA. She was asked to bring a copy into the hospital so that it can be reviewed and entered into EMR.    Goals  Level 1 code  status.   Full code.   Disease focused care.   No limits placed.   Spoke with sister Dmitry/CRISELDA who states patient wants all interventions to prolong survivability  Sister reports frequent discussions with patient regarding his QoL and goals for treatment; patient motivated by being around for his children and has hopes of recovery  He would still want life-prolonging cares even if there is no chance of recovery  His wish is to never be placed in a facility, short or long term and wants to remain at his sisters home. Sister, grandmother and best friend are all home healthcare nurses who provided 24/7 support to patient at home  Plan to return home upon discharge.   Sister requesting help setting up PT/OT at home.  Family would like all the home support they can get and are interested in following with palliative care. They prefer home palliative care. Will place on hospital follow up list.    Palliative care by specialist  Pertinent palliative diagnoses include: Testicular cancer s.p left orchiectomy/chemo, Chronic respiratory failure s/p Trach/vent dependent/PEG tube placement, neuromuscular syndrome, hx of prior PE on Eliquis, Toxic metabolic encephalopathy, hx of cardiac arrest, anxiety, depression    Social support  Patient is supported by mother, sister  Supportive listening provided  Normalized experience of patient/family  Provided anxiety containment  Provided anticipatory guidance    Follow up  Palliative Care will continue to follow. [facility] will return Monday, 10/14/23.  Please reach out to on-call provider via Epic Secure Chat  if questions or concerns arise.  Please do not hesitate to reach our on call provider through our clinic answering service at [phone] should you have acute symptom control concerns.      History of Present Illness   HPI: Hemanth Swanson is a 39 y.o. year old male who presents with PMH of metastatic testicular seminoma s/p left orchiectomy/chemo, prior PE on Eliquis, HFpEF,  progressive neuromuscular syndrome s/p PEG/Trach/cent dependent who presented to [facility] ED after pulling out PEG tube. Found to be febrile, in acute respiratory failure and CXR revealed opacification of left lung. Patient transferred to [facility] ICU floor for further work up and management. [facility] team consulted for goals of care and introduction to palliative care services for ongoing home support.     ([phone]) Had extensive phone discussion with patient's sister, Dmitry Swanson. Explained [facility] services and discussed goals of care. They are treatment-focused and are hoping he can make a full recovery and patient can return to a normal life. Though they are unsure of what has lead to his current health status they are provided hope by their outpatient doctors that he could improve. Patient would want all life-prolonging interventions without limits. He is motivated to continue living for his children. Sister reports he has only one wish, and it is to never be placed into a nursing facility for either short or long term care. His sister, grandmother and best friend all work for a home healthcare agency and help care for him at his sister's home. Sister reports they are managing well but have had challenges getting him set up with home PT/OT due to insurance reasons. She reports no other home safety concerns. Sister is open to ongoing palliative care support and prefers home palliative agency if possible due to transport challenges. All questions and concerns addressed today to sister's satisfaction.       Review of Systems   Unable to perform ROS: Patient nonverbal     I have reviewed the patient's PMH, PSH, Social History, Family History, Meds, and Allergies    Objective :  Temp:  [96.8 °F (36 °C)-98.4 °F (36.9 °C)] 96.8 °F (36 °C)  HR:  [] 49  BP: ()/(43-94) 98/66  Resp:  [18-21] 21  SpO2:  [56 %-100 %] 100 %  O2 Device: Ventilator  FiO2 (%):  [40] 40    Physical Exam  Vitals reviewed: not performed. phone  visit only..            Lab Results: I have reviewed the following results:  Lab Results   Component Value Date/Time    SODIUM 144 10/11/2024 05:05 AM    SODIUM 136 12/19/2023 12:04 PM    K 3.4 (L) 10/11/2024 05:05 AM    K 3.9 12/19/2023 12:04 PM    BUN 7 10/11/2024 05:05 AM    BUN 7 12/19/2023 12:04 PM    CREATININE 0.69 10/11/2024 05:05 AM    CREATININE 0.68 12/19/2023 12:04 PM    GLUC 109 10/11/2024 05:05 AM    GLUC 86 12/19/2023 12:04 PM    CALCIUM 7.9 (L) 10/11/2024 05:05 AM    CALCIUM 9.6 12/19/2023 12:04 PM    AST 12 (L) 10/08/2024 03:44 AM    AST 25 12/19/2023 12:04 PM    ALT 11 10/08/2024 03:44 AM    ALT 53 12/19/2023 12:04 PM    ALB 2.7 (L) 10/08/2024 03:44 AM    ALB 4.2 12/19/2023 12:04 PM    TP 5.2 (L) 10/08/2024 03:44 AM    TP 7.3 12/19/2023 12:04 PM    EGFR 121 10/11/2024 05:05 AM    EGFR 123 12/19/2023 12:04 PM     Lab Results   Component Value Date/Time    HGB 12.1 10/11/2024 05:05 AM    WBC 11.51 (H) 10/11/2024 05:05 AM     10/11/2024 05:05 AM    INR 1.46 (H) 10/08/2024 03:44 AM    PTT 31 10/08/2024 03:44 AM     Lab Results   Component Value Date/Time    LMZ0MVVZICBG 3.179 10/08/2024 07:46 AM       Imaging Results Review: I reviewed radiology reports from this admission including: chest xray, CT chest, CT abdomen/pelvis, and CT head.  Other Study Results Review: EKG was reviewed.       Administrative Statements   I have spent a total time of 60 minutes in caring for this patient on the day of the visit/encounter including Risks and benefits of tx options, Patient and family education, Risk factor reductions, Counseling / Coordination of care, Documenting in the medical record, Reviewing / ordering tests, medicine, procedures  , Obtaining or reviewing history  , and Communicating with other healthcare professionals . Discussed case with ICU team, CM. Phone call to POA/sister (40 min).

## 2024-10-11 NOTE — ASSESSMENT & PLAN NOTE
Decisional apparatus:  Patient is not competent on exam today.  If competency is lost, patient's substitute decision maker would default to mother by [place] Act 169.  Advance Directive / Living Will / POLST / POA Forms: Has POA documents at home. Sister Dmitry Swanson is POA. She was asked to bring a copy into the hospital so that it can be reviewed and entered into EMR.    Goals  Level 1 code status.   Full code.   Disease focused care.   No limits placed.   Spoke with sister Dmitry/CRISELDA who states patient wants all interventions to prolong survivability  Sister reports frequent discussions with patient regarding his QoL and goals for treatment; patient motivated by being around for his children and has hopes of recovery  He would still want life-prolonging cares even if there is no chance of recovery  His wish is to never be placed in a facility, short or long term and wants to remain at his sisters home. Sister, grandmother and best friend are all home healthcare nurses who provided 24/7 support to patient at home  Plan to return home upon discharge.   Sister requesting help setting up PT/OT at home.  Family would like all the home support they can get and are interested in following with palliative care. They prefer home palliative care. Will place on hospital follow up list.

## 2024-10-11 NOTE — ASSESSMENT & PLAN NOTE
40 y/o male with metastatic L testicular seminoma s/p orchiectomy/chemotherapy (diagnosed 12/2022 and chemotherapy later discontinued due to side effects), prior PE on Eliquis (8/2023), unexplained respiratory failure s/p PEG/trach/vent dependent (9/2023), concern for neuromuscular syndrome (however extensive workup has been unrevealing thus far), prior cardiac arrest (5/2024), anxiety, depression, and bipolar disorder who initially presented to [facility] on 10/5/2024 after pulling out his G-tube.  In the ED, he was noted to be tachycardic, hypoxic, and febrile with leukocytosis.  He was found to have L sided opacification on CXR and was started on antibiotics.  He was subsequently transferred to [facility] ICU for further monitoring and management. Bronchoscopy cultures positive for Proteus and Pseudomonas.     While in the ICU, he has been having episodes of bradycardia and hypoxia, which seem to occur during coughing episodes or when he is agitated/anxious.  On 10/8 into 10/9, patient was noted to have seizure-like activity, described as rhythmic movements and eye rolling that responded to Ativan. No bradycardia/hypoxia reportedly occurred during this episode. CT head negative for acute intracranial abnormalities.  Neurology consulted for further evaluation of seizure-like activity.    Patient had seizure like activity yesterday morning (10/10) with eyes open and rhythmic jerking of his body lasting several minutes and resolving spontaneously.  VEEG was started.  Overnight, patient had further shaking/convulsing episodes in the setting of hypoxia in the 60s-70s, bradycardia in the low 40s, and hypotension requiring pressors.    VEEG captured numerous events of patient repetitively extending his leg/pushing against the footboard resulting in whole body rocking up and down in the bed.  He also had multiple events of left arm shaking and multiple events of jaw tremor often with eyes reported to be rolling back.   All of these events were without EEG changes other than motion artifact and do not look to be seizures.  His EEG overall has normal alpha rhythm, but some intermittent left temporal slowing.  No epileptiform discharges or seizures.    Etiology for shaking spells likely due to vagal response and possibly convulsive syncope in the setting of hypoxia/bradycardia.  There may also be a functional component.  No evidence of seizures.    Plan:  - Can discontinue vEEG  - Hold off on administering Ativan for shaking spells as these are not seizures  - No need for AED  - PT/OT as able  - Monitor neuro exam; notify with any changes  - Medical management and supportive care per ICU. Correction of any metabolic or infectious disturbances.   - No further inpatient Neuro recommendations.  Please call with any questions or concerns.

## 2024-10-11 NOTE — OCCUPATIONAL THERAPY NOTE
Occupational Therapy Cancellation Note     Patient Name: Hemanth Swanson  Today's Date: 10/11/2024     10/11/24 1030   OT Last Visit   OT Visit Date 10/11/24   Note Type   Note type Cancelled Session   Cancel Reasons Medical status   Additional Comments OT consult received and chart reviewed. Per ICU mobility rounds, pt is not appropriate for participation in OT evaluation at this time. Will hold and f/u as able.     Merced Forte MS OTR/L   [place] Licensure# [id number]

## 2024-10-12 LAB
ANION GAP SERPL CALCULATED.3IONS-SCNC: 7 MMOL/L (ref 4–13)
BASOPHILS # BLD AUTO: 0.04 THOUSANDS/ΜL (ref 0–0.1)
BASOPHILS NFR BLD AUTO: 0 % (ref 0–1)
BUN SERPL-MCNC: 6 MG/DL (ref 5–25)
CA-I BLD-SCNC: 1.06 MMOL/L (ref 1.12–1.32)
CALCIUM SERPL-MCNC: 8.2 MG/DL (ref 8.4–10.2)
CHLORIDE SERPL-SCNC: 105 MMOL/L (ref 96–108)
CO2 SERPL-SCNC: 29 MMOL/L (ref 21–32)
CREAT SERPL-MCNC: 0.65 MG/DL (ref 0.6–1.3)
EOSINOPHIL # BLD AUTO: 0 THOUSAND/ΜL (ref 0–0.61)
EOSINOPHIL NFR BLD AUTO: 0 % (ref 0–6)
ERYTHROCYTE [DISTWIDTH] IN BLOOD BY AUTOMATED COUNT: 18 % (ref 11.6–15.1)
GFR SERPL CREATININE-BSD FRML MDRD: 124 ML/MIN/1.73SQ M
GLUCOSE SERPL-MCNC: 112 MG/DL (ref 65–140)
GLUCOSE SERPL-MCNC: 134 MG/DL (ref 65–140)
HCT VFR BLD AUTO: 40 % (ref 36.5–49.3)
HGB BLD-MCNC: 12.5 G/DL (ref 12–17)
IMM GRANULOCYTES # BLD AUTO: 0.12 THOUSAND/UL (ref 0–0.2)
IMM GRANULOCYTES NFR BLD AUTO: 1 % (ref 0–2)
LYMPHOCYTES # BLD AUTO: 0.76 THOUSANDS/ΜL (ref 0.6–4.47)
LYMPHOCYTES NFR BLD AUTO: 5 % (ref 14–44)
MAGNESIUM SERPL-MCNC: 2.2 MG/DL (ref 1.9–2.7)
MCH RBC QN AUTO: 30.1 PG (ref 26.8–34.3)
MCHC RBC AUTO-ENTMCNC: 31.3 G/DL (ref 31.4–37.4)
MCV RBC AUTO: 96 FL (ref 82–98)
MONOCYTES # BLD AUTO: 1.04 THOUSAND/ΜL (ref 0.17–1.22)
MONOCYTES NFR BLD AUTO: 7 % (ref 4–12)
NEUTROPHILS # BLD AUTO: 12.64 THOUSANDS/ΜL (ref 1.85–7.62)
NEUTS SEG NFR BLD AUTO: 87 % (ref 43–75)
NRBC BLD AUTO-RTO: 0 /100 WBCS
PHOSPHATE SERPL-MCNC: 1.8 MG/DL (ref 2.7–4.5)
PLATELET # BLD AUTO: 220 THOUSANDS/UL (ref 149–390)
PMV BLD AUTO: 11.2 FL (ref 8.9–12.7)
POTASSIUM SERPL-SCNC: 3.9 MMOL/L (ref 3.5–5.3)
PROCALCITONIN SERPL-MCNC: 0.38 NG/ML
RBC # BLD AUTO: 4.15 MILLION/UL (ref 3.88–5.62)
SODIUM SERPL-SCNC: 141 MMOL/L (ref 135–147)
TESTOST FREE SERPL-MCNC: >50 PG/ML (ref 8.7–25.1)
TESTOST SERPL-MCNC: >1500 NG/DL (ref 264–916)
WBC # BLD AUTO: 14.6 THOUSAND/UL (ref 4.31–10.16)

## 2024-10-12 PROCEDURE — 84145 PROCALCITONIN (PCT): CPT

## 2024-10-12 PROCEDURE — 94150 VITAL CAPACITY TEST: CPT

## 2024-10-12 PROCEDURE — 84100 ASSAY OF PHOSPHORUS: CPT

## 2024-10-12 PROCEDURE — 99254 IP/OBS CNSLTJ NEW/EST MOD 60: CPT | Performed by: SURGERY

## 2024-10-12 PROCEDURE — 94640 AIRWAY INHALATION TREATMENT: CPT

## 2024-10-12 PROCEDURE — 83735 ASSAY OF MAGNESIUM: CPT

## 2024-10-12 PROCEDURE — 94669 MECHANICAL CHEST WALL OSCILL: CPT

## 2024-10-12 PROCEDURE — 85025 COMPLETE CBC W/AUTO DIFF WBC: CPT

## 2024-10-12 PROCEDURE — 94003 VENT MGMT INPAT SUBQ DAY: CPT

## 2024-10-12 PROCEDURE — NC001 PR NO CHARGE: Performed by: STUDENT IN AN ORGANIZED HEALTH CARE EDUCATION/TRAINING PROGRAM

## 2024-10-12 PROCEDURE — 99232 SBSQ HOSP IP/OBS MODERATE 35: CPT | Performed by: STUDENT IN AN ORGANIZED HEALTH CARE EDUCATION/TRAINING PROGRAM

## 2024-10-12 PROCEDURE — 82948 REAGENT STRIP/BLOOD GLUCOSE: CPT

## 2024-10-12 PROCEDURE — 94664 DEMO&/EVAL PT USE INHALER: CPT

## 2024-10-12 PROCEDURE — 94760 N-INVAS EAR/PLS OXIMETRY 1: CPT

## 2024-10-12 PROCEDURE — 82330 ASSAY OF CALCIUM: CPT

## 2024-10-12 PROCEDURE — 80048 BASIC METABOLIC PNL TOTAL CA: CPT

## 2024-10-12 RX ORDER — LORAZEPAM 2 MG/ML
2 INJECTION INTRAMUSCULAR EVERY 4 HOURS PRN
Status: DISCONTINUED | OUTPATIENT
Start: 2024-10-12 | End: 2024-10-15

## 2024-10-12 RX ORDER — LORAZEPAM 2 MG/ML
INJECTION INTRAMUSCULAR
Status: COMPLETED
Start: 2024-10-12 | End: 2024-10-12

## 2024-10-12 RX ORDER — CALCIUM GLUCONATE 20 MG/ML
2 INJECTION, SOLUTION INTRAVENOUS ONCE
Status: COMPLETED | OUTPATIENT
Start: 2024-10-12 | End: 2024-10-13

## 2024-10-12 RX ORDER — FUROSEMIDE 10 MG/ML
40 INJECTION INTRAMUSCULAR; INTRAVENOUS ONCE
Status: COMPLETED | OUTPATIENT
Start: 2024-10-12 | End: 2024-10-12

## 2024-10-12 RX ORDER — LORAZEPAM 2 MG/ML
2 INJECTION INTRAMUSCULAR ONCE
Status: COMPLETED | OUTPATIENT
Start: 2024-10-12 | End: 2024-10-12

## 2024-10-12 RX ORDER — MIDODRINE HYDROCHLORIDE 5 MG/1
10 TABLET ORAL EVERY 8 HOURS SCHEDULED
Status: DISCONTINUED | OUTPATIENT
Start: 2024-10-12 | End: 2024-10-13

## 2024-10-12 RX ADMIN — NOREPINEPHRINE BITARTRATE 5 MCG/MIN: 1 INJECTION INTRAVENOUS at 01:28

## 2024-10-12 RX ADMIN — LEVALBUTEROL HYDROCHLORIDE 1.25 MG: 1.25 SOLUTION RESPIRATORY (INHALATION) at 19:25

## 2024-10-12 RX ADMIN — FUROSEMIDE 40 MG: 10 INJECTION, SOLUTION INTRAMUSCULAR; INTRAVENOUS at 17:28

## 2024-10-12 RX ADMIN — LEVALBUTEROL HYDROCHLORIDE 1.25 MG: 1.25 SOLUTION RESPIRATORY (INHALATION) at 01:43

## 2024-10-12 RX ADMIN — PIPERACILLIN SODIUM AND TAZOBACTAM SODIUM 4.5 G: 36; 4.5 INJECTION, POWDER, LYOPHILIZED, FOR SOLUTION INTRAVENOUS at 17:00

## 2024-10-12 RX ADMIN — FUROSEMIDE 40 MG: 10 INJECTION, SOLUTION INTRAMUSCULAR; INTRAVENOUS at 12:15

## 2024-10-12 RX ADMIN — LEVALBUTEROL HYDROCHLORIDE 1.25 MG: 1.25 SOLUTION RESPIRATORY (INHALATION) at 07:42

## 2024-10-12 RX ADMIN — APIXABAN 5 MG: 5 TABLET, FILM COATED ORAL at 08:11

## 2024-10-12 RX ADMIN — LEVALBUTEROL HYDROCHLORIDE 1.25 MG: 1.25 SOLUTION RESPIRATORY (INHALATION) at 13:40

## 2024-10-12 RX ADMIN — NOREPINEPHRINE BITARTRATE 4 MCG/MIN: 1 INJECTION INTRAVENOUS at 18:09

## 2024-10-12 RX ADMIN — PIPERACILLIN SODIUM AND TAZOBACTAM SODIUM 4.5 G: 36; 4.5 INJECTION, POWDER, LYOPHILIZED, FOR SOLUTION INTRAVENOUS at 08:06

## 2024-10-12 RX ADMIN — POTASSIUM PHOSPHATE, MONOBASIC AND POTASSIUM PHOSPHATE, DIBASIC 21 MMOL: 224; 236 INJECTION, SOLUTION, CONCENTRATE INTRAVENOUS at 06:54

## 2024-10-12 RX ADMIN — APIXABAN 5 MG: 5 TABLET, FILM COATED ORAL at 17:29

## 2024-10-12 RX ADMIN — ACETYLCYSTEINE 600 MG: 200 SOLUTION ORAL; RESPIRATORY (INHALATION) at 01:43

## 2024-10-12 RX ADMIN — HYDROCORTISONE SODIUM SUCCINATE 50 MG: 100 INJECTION, POWDER, FOR SOLUTION INTRAMUSCULAR; INTRAVENOUS at 17:29

## 2024-10-12 RX ADMIN — LORAZEPAM 2 MG: 2 INJECTION INTRAMUSCULAR; INTRAVENOUS at 17:29

## 2024-10-12 RX ADMIN — HYDROCORTISONE SODIUM SUCCINATE 50 MG: 100 INJECTION, POWDER, FOR SOLUTION INTRAMUSCULAR; INTRAVENOUS at 13:00

## 2024-10-12 RX ADMIN — MIDODRINE HYDROCHLORIDE 10 MG: 5 TABLET ORAL at 14:34

## 2024-10-12 RX ADMIN — HYDROCORTISONE SODIUM SUCCINATE 50 MG: 100 INJECTION, POWDER, FOR SOLUTION INTRAMUSCULAR; INTRAVENOUS at 05:07

## 2024-10-12 RX ADMIN — CHLORHEXIDINE GLUCONATE 15 ML: 1.2 RINSE ORAL at 08:11

## 2024-10-12 RX ADMIN — MIDODRINE HYDROCHLORIDE 5 MG: 5 TABLET ORAL at 05:07

## 2024-10-12 RX ADMIN — SENNOSIDES AND DOCUSATE SODIUM 1 TABLET: 8.6; 5 TABLET ORAL at 08:11

## 2024-10-12 RX ADMIN — FENTANYL CITRATE 50 MCG: 50 INJECTION INTRAMUSCULAR; INTRAVENOUS at 07:12

## 2024-10-12 RX ADMIN — LORAZEPAM 2 MG: 2 INJECTION INTRAMUSCULAR; INTRAVENOUS at 11:00

## 2024-10-12 RX ADMIN — FENTANYL CITRATE 50 MCG: 50 INJECTION INTRAMUSCULAR; INTRAVENOUS at 10:21

## 2024-10-12 RX ADMIN — CHLORHEXIDINE GLUCONATE 15 ML: 1.2 RINSE ORAL at 21:27

## 2024-10-12 RX ADMIN — MIDODRINE HYDROCHLORIDE 10 MG: 5 TABLET ORAL at 21:27

## 2024-10-12 RX ADMIN — FLUDROCORTISONE ACETATE 0.05 MG: 0.1 TABLET ORAL at 08:12

## 2024-10-12 RX ADMIN — CALCIUM GLUCONATE 2 G: 20 INJECTION, SOLUTION INTRAVENOUS at 06:50

## 2024-10-12 RX ADMIN — LORAZEPAM 2 MG: 2 INJECTION INTRAMUSCULAR at 11:00

## 2024-10-12 RX ADMIN — ACETYLCYSTEINE 600 MG: 200 SOLUTION ORAL; RESPIRATORY (INHALATION) at 07:43

## 2024-10-12 RX ADMIN — SENNOSIDES AND DOCUSATE SODIUM 1 TABLET: 8.6; 5 TABLET ORAL at 17:30

## 2024-10-12 RX ADMIN — QUETIAPINE FUMARATE 25 MG: 25 TABLET ORAL at 21:27

## 2024-10-12 NOTE — PLAN OF CARE
Problem: Prexisting or High Potential for Compromised Skin Integrity  Goal: Skin integrity is maintained or improved  Description: INTERVENTIONS:  - Identify patients at risk for skin breakdown  - Assess and monitor skin integrity  - Assess and monitor nutrition and hydration status  - Monitor labs   - Assess for incontinence   - Turn and reposition patient  - Assist with mobility/ambulation  - Relieve pressure over bony prominences  - Avoid friction and shearing  - Provide appropriate hygiene as needed including keeping skin clean and dry  - Evaluate need for skin moisturizer/barrier cream  - Collaborate with interdisciplinary team   - Patient/family teaching  - Consider wound care consult   Outcome: Progressing     Problem: PAIN - ADULT  Goal: Verbalizes/displays adequate comfort level or baseline comfort level  Description: Interventions:  - Encourage patient to monitor pain and request assistance  - Assess pain using appropriate pain scale  - Administer analgesics based on type and severity of pain and evaluate response  - Implement non-pharmacological measures as appropriate and evaluate response  - Consider cultural and social influences on pain and pain management  - Notify physician/advanced practitioner if interventions unsuccessful or patient reports new pain  Outcome: Progressing     Problem: INFECTION - ADULT  Goal: Absence or prevention of progression during hospitalization  Description: INTERVENTIONS:  - Assess and monitor for signs and symptoms of infection  - Monitor lab/diagnostic results  - Monitor all insertion sites, i.e. indwelling lines, tubes, and drains  - Monitor endotracheal if appropriate and nasal secretions for changes in amount and color  - [place] appropriate cooling/warming therapies per order  - Administer medications as ordered  - Instruct and encourage patient and family to use good hand hygiene technique  - Identify and instruct in appropriate isolation precautions for  identified infection/condition  Outcome: Progressing  Goal: Absence of fever/infection during neutropenic period  Description: INTERVENTIONS:  - Monitor WBC    Outcome: Progressing     Problem: SAFETY ADULT  Goal: Patient will remain free of falls  Description: INTERVENTIONS:  - Educate patient/family on patient safety including physical limitations  - Instruct patient to call for assistance with activity   - Consult OT/PT to assist with strengthening/mobility   - Keep Call bell within reach  - Keep bed low and locked with side rails adjusted as appropriate  - Keep care items and personal belongings within reach  - Initiate and maintain comfort rounds  - Apply yellow socks and bracelet for high fall risk patients  - Consider moving patient to room near nurses station  Outcome: Progressing  Goal: Maintain or return to baseline ADL function  Description: INTERVENTIONS:  -  Assess patient's ability to carry out ADLs; assess patient's baseline for ADL function and identify physical deficits which impact ability to perform ADLs (bathing, care of mouth/teeth, toileting, grooming, dressing, etc.)  - Assess/evaluate cause of self-care deficits   - Assess range of motion  - Assess patient's mobility; develop plan if impaired  - Assess patient's need for assistive devices and provide as appropriate  - Encourage maximum independence but intervene and supervise when necessary  - Involve family in performance of ADLs  - Assess for home care needs following discharge   - Consider OT consult to assist with ADL evaluation and planning for discharge  - Provide patient education as appropriate  Outcome: Progressing  Goal: Maintains/Returns to pre admission functional level  Description: INTERVENTIONS:  - Perform AM-PAC 6 Click Basic Mobility/ Daily Activity assessment daily.  - Set and communicate daily mobility goal to care team and patient/family/caregiver.   - Collaborate with rehabilitation services on mobility goals if  consulted  - Out of bed for toileting  - Record patient progress and toleration of activity level   Outcome: Progressing     Problem: DISCHARGE PLANNING  Goal: Discharge to home or other facility with appropriate resources  Description: INTERVENTIONS:  - Identify barriers to discharge w/patient and caregiver  - Arrange for needed discharge resources and transportation as appropriate  - Identify discharge learning needs (meds, wound care, etc.)  - Arrange for interpretive services to assist at discharge as needed  - Refer to Case Management Department for coordinating discharge planning if the patient needs post-hospital services based on physician/advanced practitioner order or complex needs related to functional status, cognitive ability, or social support system  Outcome: Progressing     Problem: Knowledge Deficit  Goal: Patient/family/caregiver demonstrates understanding of disease process, treatment plan, medications, and discharge instructions  Description: Complete learning assessment and assess knowledge base.  Interventions:  - Provide teaching at level of understanding  - Provide teaching via preferred learning methods  Outcome: Progressing     Problem: SAFETY,RESTRAINT: NV/NON-SELF DESTRUCTIVE BEHAVIOR  Goal: Remains free of harm/injury (restraint for non violent/non self-detsructive behavior)  Description: INTERVENTIONS:  - Instruct patient/family regarding restraint use   - Assess and monitor physiologic and psychological status   - Provide interventions and comfort measures to meet assessed patient needs   - Identify and implement measures to help patient regain control  - Assess readiness for release of restraint   Outcome: Progressing  Goal: Returns to optimal restraint-free functioning  Description: INTERVENTIONS:  - Assess the patient's behavior and symptoms that indicate continued need for restraint  - Identify and implement measures to help patient regain control  - Assess readiness for release of  restraint   Outcome: Progressing

## 2024-10-12 NOTE — RESPIRATORY THERAPY NOTE
10/12/24 0743   Inhalation Therapy Tx   $ Inhalation Therapy Performed Yes   $ Pulse Oximetry Spot Check Charge Completed   SpO2 99 %   Pre-Treatment Pulse 58   Pre-Treatment Respirations 18   Duration 30   Breath Sounds Pre-Treatment Bilateral Diminished   Breath Sounds Post-Treatment Bilateral Diminished   Post-Treatment Pulse 59   Post-Treatment Respirations 18   Delivery Source Aerogen;Vent   Position Semi Clay's   Treatment Tolerance Tolerated well   Resp Comments Spoke to AP Nina in order to not induce episode with pt CPT held. Trach care done. Inner cannula changed.  Tried SBT. Pt immediately went apneic. Increased PDS by 2 till 10 and still unable to pull VT or intiate breaths    Oral Suctioning/Secretions   Suction Type Oral   Suction Device Yankauer   Secretion Amount Large   Secretion Color Clear   Secretion Consistency Thick   Suction Tolerance Tolerated well   Suctioning Adverse Effects None   Airway Suctioning/Secretions   Suction Type Tracheal   Suction Device Inline   Secretion Amount Small   Secretion Color White   Secretion Consistency Thick   Suction Tolerance Tolerated well   Suctioning Adverse Effects None

## 2024-10-12 NOTE — RESPIRATORY THERAPY NOTE
10/12/24 0715   Inhalation Therapy Tx   Resp Comments Called to pt's room by RN. Pt bearing down, Desating and diaphoretic. Bagged pt till episode passed and placed back on th event

## 2024-10-12 NOTE — RESPIRATORY THERAPY NOTE
Called to pt's room. He was in [place] episode with desats. Manually ventialted the pt till he stopped bearing down and sats came up. Assisted with cleanning and turning pt. Had 1 more episode , Manually ventilated and RN gave Ativan, Pt returned to baseline

## 2024-10-12 NOTE — PROGRESS NOTES
Critical Care Attending Note; John Sutherland   Note Date: 10/12/24  Note Time: 11:00 AM    Patient: Hemanth Swanson  Age, : 39 y.o., 1985 MRN: [id number] Code Status: Level 1 - Full Code Patient Location: ICU 05/ICU 05   Hospital LOS:7 days  ]   Patient seen and examined, medical record reviewed, discussed with house staff and nursing staff.     HPI   CC: Hypernatremia  39M with a PMH Seminoma(Orcetomy/Chemo), HTN, HFpEF, DM, Cardiac Arrest(Hypoxic - ), Demyelinating Neuromuscular Disorder(Trach/Peg),   Who presents after removing peg tube found to have aspiration and hypernatremia.     Key Recent Events   10/5: Admitted  10/6: Bronch  10/8: Atropine Given X1 - Sinus Bradycardia, normotensive  10/9: Seizure like activity, Atropine   Main ICU Plans:       #Neuro  Demyelinating Neuromuscular disorder - Baseline declining over time - Continued episodes of apnea, bearing down. Unclear etiology? We have optimized the patient for a respiratory, bladder and bowel status but episodes continue.   - Neurology Consulted - Appreciate recs - EEG Negative  - PMR consult   - Ativan PRN     #Card  VT/Bradycardia - Recurrent Vagal? - Patient having episodes of bearing down and apnea. Plan to optimize bowel regimen to prevent strain  - Cardiology Consult -Appreciate recs  - K>4 Mg> 2    HFpEF   - Lasix 40 IV     Hypotension - Component of vaso plegia vs autonomic insufficiency - Minimal improvement   - Levophed - MAP > 65mmHg  - Midodrine   - Hydrocortisone/Florinef      #Pulm  Acute Respiratory Failure - Secondary to mucous plug/Pneumonia - Trach - Stopping Chest PT concerns for triggering episodes  - LTVV VC - 500cc  - Wean FiO2 - Maintain Oxygen Sat >92%  - VAP Bundle  - HOB > 30o    - PEEP Titrate  - Trach care  - Xopenex    Pulmonary Embolism   - Eliquis     #Renal  Hypernatremia - Resolved  - Nephrology Consult - Appreciate recs  - FWF    #GI  Bowel Regimen - Mirilax/Enema    #ID   Pnemonia - Likely Aspiration  - Growing proteus, pseudomonas - Continued hypotension   - Zosyn       #Endo  DM  - ISS      #DVT/GI ppx  Apixaban    #Lines/Tubes/Drains:   Invasive Devices       Central Venous Catheter Line  Duration             Port A Cath 03/14/23 Right Subclavian 578 days              Peripheral Intravenous Line  Duration             Peripheral IV 10/11/24 Right Antecubital <1 day              Drain  Duration             Gastrostomy/Enterostomy Percutaneous Interventional Gastrostomy 18 Fr.  days    Urethral Catheter <1 day              Airway  Duration             Surgical Airway Distal;Cuffed;Other (Comment) 132 days                    #Nutrition:   Diet Enteral/Parenteral; Tube Feeding No Oral Diet; Jevity 1.5; Continuous; 70; 200; Water; Every 4 hours        #Code Status:   Level 1 - Full Code    #Dispo:   ICU        John Sutherland MD  Pulmonary, Critical Care    Critical care time, excluding procedures, teaching, family meetings, and excludes any prior time recorded by the AP/resident, 35 minutes. Upon my evaluation, this patient has a high probability of imminent or life-threatening deterioration due to above problems which required my direct attention, intervention, and personal management.   Impression/Active Problems:    HTN  HFpEF  DM   Neuromuscualr disorder   Tracheostomy   PEG   Hypernatremia   Aspiration   Vaso vagal    Ventricular Tachycardia   Bradycardia    Physical Exam:     Vital Signs:   Weight: 96.7 kg (213 lb 3 oz)  IBW: Ideal body weight: 86.8 kg (191 lb 5.7 oz)  Adjusted ideal body weight: 90.8 kg (200 lb 1.4 oz)  Temp:  [94.3 °F (34.6 °C)-98.4 °F (36.9 °C)] 96.6 °F (35.9 °C)  HR:  [44-73] 63  BP: ()/(50-90) 135/90  SpO2:  [97 %-100 %] 97 %  Physical Exam: Unchanged   General: NAD  Neuro: Alert, intermittently following commands  Heart: RRR  Lungs: Diminished Bases  Abdomen: Soft ND  Extremities: Min edema                Ventilator Settings:     Vent Mode: AC/VC    Results from last  7 days   Lab Units 10/11/24  0204 10/10/24  1106 10/10/24  0928   PO2 SYLVESTER mm Hg 40.7 33.6* 55.5*     Radiologic Images Reviewed:   MRI 6/16  No acute intracranial pathology.  Stable mild nonspecific white matter changes likely due to chronic microangiopathy.    CXR   White Out Left     CT head  No acute intracranial abnormality. s  Input / Output:     Intake/Output Summary (Last 24 hours) at 10/12/2024 1100  Last data filed at 10/12/2024 1000  Gross per 24 hour   Intake 6126.68 ml   Output 1083 ml   Net 5043.68 ml            Infusions:  dextrose, 100 mL/hr, Last Rate: 100 mL/hr (10/10/24 1000)  DOPamine in dextrose 5%, 5-20 mcg/kg/min, Last Rate: Stopped (10/10/24 0845)  norepinephrine, 1-30 mcg/min, Last Rate: 5 mcg/min (10/12/24 0128)      Scheduled Medications:  Current Facility-Administered Medications   Medication Dose Route Frequency Provider Last Rate    acetaminophen  650 mg Per PEG Tube Q6H PRN Ne Low MD      apixaban  5 mg Per PEG Tube BID Mohsen Moon MD      bisacodyl  10 mg Rectal Daily PRN Ne Low MD      chlorhexidine  15 mL Mouth/Throat Q12H [facility] Mohsen Moon MD      dextrose  100 mL/hr Intravenous Continuous AMBER Tucker 100 mL/hr (10/10/24 1000)    DOPamine in dextrose 5%  5-20 mcg/kg/min Intravenous Titrated Bhupinder Angela MD Stopped (10/10/24 0845)    fentaNYL  50 mcg Intravenous Q2H PRN AMBER Tucker      fludrocortisone  0.05 mg Per PEG Tube Daily Ne Low MD      hydrocortisone sodium succinate  50 mg Intravenous Q6H [facility] Ne Low MD      levalbuterol  1.25 mg Nebulization Q6H Silvio Alas MD      LORazepam           LORazepam  2 mg Intravenous Once Lionel Anne DO      midodrine  10 mg Oral Q8H [facility] Ne Low MD      norepinephrine  1-30 mcg/min Intravenous Titrated Silvio Alas MD 5 mcg/min (10/12/24 0128)    piperacillin-tazobactam  4.5 g Intravenous Q8H  "Ne Low MD 4.5 g (10/12/24 0806)    polyethylene glycol  17 g Per PEG Tube Daily PRN Ne Low MD      QUEtiapine  25 mg Oral HS MD monique Kochna-docusate sodium  1 tablet Per PEG Tube BID Ne Low MD         PRN Medications:    acetaminophen    [COMPLETED] bisacodyl **FOLLOWED BY** bisacodyl    fentaNYL    LORazepam    polyethylene glycol    Labs Reviewed:  Results from last 7 days   Lab Units 10/12/24  0517 10/11/24  0505 10/10/24  0416   WBC Thousand/uL 14.60* 11.51* 16.43*   HEMOGLOBIN g/dL 12.5 12.1 14.4   HEMATOCRIT % 40.0 39.1 46.3   PLATELETS Thousands/uL 220 169 182      Results from last 7 days   Lab Units 10/12/24  0517 10/11/24  0505 10/11/24  0014 10/10/24  1032 10/10/24  0415   SODIUM mmol/L 141 144 145   < >  --    CO2 mmol/L 29 30 31   < >  --    BUN mg/dL 6 7 8   < >  --    CALCIUM mg/dL 8.2* 7.9* 8.3*   < >  --    MAGNESIUM mg/dL 2.2 2.3  --   --  2.5   PHOSPHORUS mg/dL 1.8* 3.2  --   --  3.0    < > = values in this interval not displayed.         Invalid input(s): \"ASTSGOT\", \"ALTSGPT\"LABRCNTIP@ ,alkphos:3,tbilirubin:3,dbilirubin:3)@  Results from last 7 days   Lab Units 10/08/24  0344 10/05/24  2020   INR  1.46* 1.63*           Invalid input(s): \"TROPT\", \"PBNP\"             I have personally seen and examined the patient on (10/12/24 between 3383-0413). I discussed the patient with the AP/resident including, but not limited to, verifying findings; reviewing labs and x-rays; discussing with consultants; developing the plan of care with the bedside nurse; and discussing treatment plan with patient or surrogate.  I have reviewed the note and assessment performed by the AP/resident and agree with the AP/resident’s documented findings and plan of care with the above additions/exceptions. Please see my comments for details and adjustments.                 "

## 2024-10-12 NOTE — ASSESSMENT & PLAN NOTE
[place] ED: febrile, tachycardic, with leukocytosis and a lactate of 12.  X-ray with left sided opacification, increased secretions from tracheostomy, initial pro killian negative. Received weight based fluids.    Transferred to [facility] for further management.  Repeat lab work upon arrival show persistent leukocytosis and hypernatremia.  Urine strep and Legionella negative  [facility] culture revealed Proteus and Pseudomonas    Plan:  Continue Zosyn  Continue vent support, frequent suctioning  Xopenex nebulizers

## 2024-10-12 NOTE — PROGRESS NOTES
Progress Note - Critical Care/ICU   Name: Hemanth Swanson 39 y.o. male I MRN: [id number]  Unit/Bed#: ICU 05 I Date of Admission: 10/5/2024   Date of Service: 10/12/2024 I Hospital Day: 7      Assessment & Plan  Hypernatremia  Hypernatremia with hyperchloremia, likely in the setting of dehydration.  Sodium level had been difficult to control.  Now in the mid 140s    Plan:  Continue 200 cc flushes and 100 cc/hr maintenance.  Labs daily now.  Bradycardia  Likely vagal response to coughing.  Spontaneously resolves without intervention.  Was initially started on dopamine with heart rate goal of 60.  Today had open heart rates up to 120s spontaneously dropping to 60s.  Dopamine has been off this morning.  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    Plan:  Continue hydrocortisone 50 mg every 6  Continue  fludrocortisone 0.05 mg daily.  Will continue to monitor.  Sepsis ([facility])  [place] ED: febrile, tachycardic, with leukocytosis and a lactate of 12.  X-ray with left sided opacification, increased secretions from tracheostomy, initial pro killian negative. Received weight based fluids.    Transferred to [facility] for further management.  Repeat lab work upon arrival show persistent leukocytosis and hypernatremia.  Urine strep and Legionella negative  [facility] culture revealed Proteus and Pseudomonas    Plan:  Continue Zosyn  Continue vent support, frequent suctioning  Xopenex nebulizers  Chronic respiratory failure with hypoxia and hypercapnia ([facility])  Neuromuscular disease post chemotherapyS/P tracheostomy in 9/2023.  Vent dependent 24/7: current vent settings 14/500/6/60  Last bronchoscopy 6/1 with cultures growing serratia and pseudomonas.  Chest X-ray 10/5: Left sided opacification with possible mucus plugging.  CXR 10/7: Near complete opacification of the left hemithorax with leftward mediastinal shift, mildly improved from 10/5/2024, suggesting  baseline underlying atelectasis and pleural effusion.   Chest x-ray 10/9 seems improved from prior.    Plan:  Continue vent support  Xopnex nebs TID  Continue mucomyst  Continue CPT  Seizure-like activity (HCC)  Repeat episode of seizure like activity this morning.  No evidence of seizure on EEG.  Plan to DC EEG today.  Consultation with PM&R for recommendations regarding rehab.  Toxic metabolic encephalopathy  Likely related to hypoxia 2/2 atelectasis vs mucus plugging.  Continue to monitor for clinical response to Abx  Continue supportive care.  Umbilical hernia without obstruction and without gangrene    Palliative care by specialist    Goals of care, counseling/discussion    Disposition: Critical care    ICU Core Measures     Vented Patient  VAP Bundle  VAP bundle ordered     A: Assess, Prevent, and Manage Pain Has pain been assessed? Yes  Need for changes to pain regimen? Yes   B: Both Spontaneous Awakening Trials (SATs) and Spontaneous Breathing Trials (SBTs) Plan to perform spontaneous awakening trial today? Yes   Plan to perform spontaneous breathing trial today? Yes   Obvious barriers to extubation? Yes   C: Choice of Sedation RASS Goal: 0 Alert and Calm  Need for changes to sedation or analgesia regimen? Yes   D: Delirium CAM-ICU: Negative   E: Early Mobility  Plan for early mobility? Yes   F: Family Engagement Plan for family engagement today? Yes       Antibiotic Review: Continue broad spectrum secondary to severity of illness.     Review of Invasive Devices:      Central access plan: Patient has multiple central venous catheters.       Prophylaxis:  VTE VTE covered by:  apixaban, Per PEG Tube, 5 mg at 10/12/24 0811       Stress Ulcer  not ordered         24 Hour Events : Patient has been suffering from frequent episodes of apnea with bearing down.  During these episodes he will desaturate to 40% and cause peak pressuring on the vent.  These episodes are relieved by bagging him manually as well as Ativan  2 mg.  Subjective   Review of Systems: See HPI for Review of Systems    Objective :                   Vitals I/O      Most Recent Min/Max in 24hrs   Temp (!) 96.6 °F (35.9 °C) Temp  Min: 94.3 °F (34.6 °C)  Max: 98.4 °F (36.9 °C)   Pulse 63 Pulse  Min: 44  Max: 73   Resp 21 No data recorded   /90 BP  Min: 84/53  Max: 143/83   O2 Sat 97 % SpO2  Min: 97 %  Max: 100 %      Intake/Output Summary (Last 24 hours) at 10/12/2024 1302  Last data filed at 10/12/2024 1000  Gross per 24 hour   Intake 6126.68 ml   Output 1083 ml   Net 5043.68 ml       Diet Enteral/Parenteral; Tube Feeding No Oral Diet; Jevity 1.5; Continuous; 70; 200; Water; Every 4 hours    Invasive Monitoring           Physical Exam   Physical Exam  Vitals reviewed.   Eyes:      General: No scleral icterus.        Right eye: No discharge.         Left eye: No discharge.   Skin:     General: Skin is cool.      Capillary Refill: Capillary refill takes less than 2 seconds.   HENT:      Head: Normocephalic and atraumatic.      Mouth/Throat:      Mouth: Mucous membranes are moist.      Comments: Profuse saliva from mouth.  Cardiovascular:      Rate and Rhythm: Normal rate and regular rhythm.      Heart sounds: Normal heart sounds.   Musculoskeletal:      Right lower le+ Edema present.      Left lower le+ Edema present.   Abdominal:      Palpations: Abdomen is soft.      Tenderness: There is no abdominal tenderness.   Constitutional:       General: He is in acute distress.      Appearance: He is well-developed. He is ill-appearing, toxic-appearing and diaphoretic.      Interventions: He is sedated, intubated and restrained.   Pulmonary:      Effort: He is intubated.      Breath sounds: No wheezing, rhonchi or rales.   Neurological:      Mental Status: He is agitated.      Motor: Strength full and intact in all extremities.   Genitourinary/Anorectal:  [place] present.        Diagnostic Studies        Lab Results: I have reviewed the following results:      Medications:  Scheduled PRN   apixaban, 5 mg, BID  chlorhexidine, 15 mL, Q12H ISAEL  fludrocortisone, 0.05 mg, Daily  furosemide, 40 mg, Once  hydrocortisone sodium succinate, 50 mg, Q6H ISAEL  levalbuterol, 1.25 mg, Q6H  LORazepam, ,   LORazepam, 2 mg, Once  midodrine, 10 mg, Q8H ISAEL  piperacillin-tazobactam, 4.5 g, Q8H  QUEtiapine, 25 mg, HS  senna-docusate sodium, 1 tablet, BID      acetaminophen, 650 mg, Q6H PRN  bisacodyl, 10 mg, Daily PRN  LORazepam, ,   LORazepam, 2 mg, Q4H PRN  polyethylene glycol, 17 g, Daily PRN       Continuous    norepinephrine, 1-30 mcg/min, Last Rate: 5 mcg/min (10/12/24 0128)         Labs:   CBC    Recent Labs     10/11/24  0505 10/12/24  0517   WBC 11.51* 14.60*   HGB 12.1 12.5   HCT 39.1 40.0    220     BMP    Recent Labs     10/11/24  0505 10/12/24  0517   SODIUM 144 141   K 3.4* 3.9    105   CO2 30 29   AGAP 6 7   BUN 7 6   CREATININE 0.69 0.65   CALCIUM 7.9* 8.2*       Coags    No recent results     Additional Electrolytes  Recent Labs     10/11/24  0505 10/12/24  0517   MG 2.3 2.2   PHOS 3.2 1.8*   CAIONIZED  --  1.06*          Blood Gas    No recent results  Recent Labs     10/11/24  0204   PHVEN 7.449*   KEB5MVA 42.7   PO2VEN 40.7   VFJ4FET 28.9   BEVEN 4.4   S1WXJAE 75.6    LFTs  No recent results    Infectious  Recent Labs     10/11/24  0505 10/12/24  0517   PROCALCITONI 0.45* 0.38*     Glucose  Recent Labs     10/10/24  1806 10/11/24  0014 10/11/24  0505 10/12/24  0517   GLUC 146* 109 109 134

## 2024-10-12 NOTE — RESPIRATORY THERAPY NOTE
10/12/24 1717   Respiratory Assessment   Resp Comments Walking by room and heard Vent alarm. High Peak pressure alarm and low VT . RN in [facility]. Manually ventliate pt and RN gave him Ativan. Pt had large amount of secretions orally clear and thick. After the Ativan pt stopped bearing down and Sat went up and Peak Pressure went down as well Vt went up.

## 2024-10-12 NOTE — ASSESSMENT & PLAN NOTE
Repeat episode of seizure like activity this morning.  No evidence of seizure on EEG.  Plan to DC EEG today.  Consultation with PM&R for recommendations regarding rehab.

## 2024-10-13 LAB
ANION GAP SERPL CALCULATED.3IONS-SCNC: 7 MMOL/L (ref 4–13)
BASOPHILS # BLD AUTO: 0.04 THOUSANDS/ΜL (ref 0–0.1)
BASOPHILS NFR BLD AUTO: 0 % (ref 0–1)
BUN SERPL-MCNC: 11 MG/DL (ref 5–25)
CA-I BLD-SCNC: 1.07 MMOL/L (ref 1.12–1.32)
CALCIUM SERPL-MCNC: 8.1 MG/DL (ref 8.4–10.2)
CHLORIDE SERPL-SCNC: 102 MMOL/L (ref 96–108)
CO2 SERPL-SCNC: 35 MMOL/L (ref 21–32)
CREAT SERPL-MCNC: 0.78 MG/DL (ref 0.6–1.3)
EOSINOPHIL # BLD AUTO: 0 THOUSAND/ΜL (ref 0–0.61)
EOSINOPHIL NFR BLD AUTO: 0 % (ref 0–6)
ERYTHROCYTE [DISTWIDTH] IN BLOOD BY AUTOMATED COUNT: 18.6 % (ref 11.6–15.1)
GFR SERPL CREATININE-BSD FRML MDRD: 115 ML/MIN/1.73SQ M
GLUCOSE SERPL-MCNC: 138 MG/DL (ref 65–140)
HCT VFR BLD AUTO: 41.8 % (ref 36.5–49.3)
HGB BLD-MCNC: 13.3 G/DL (ref 12–17)
IMM GRANULOCYTES # BLD AUTO: 0.19 THOUSAND/UL (ref 0–0.2)
IMM GRANULOCYTES NFR BLD AUTO: 1 % (ref 0–2)
LYMPHOCYTES # BLD AUTO: 0.68 THOUSANDS/ΜL (ref 0.6–4.47)
LYMPHOCYTES NFR BLD AUTO: 5 % (ref 14–44)
MAGNESIUM SERPL-MCNC: 2.1 MG/DL (ref 1.9–2.7)
MCH RBC QN AUTO: 30.5 PG (ref 26.8–34.3)
MCHC RBC AUTO-ENTMCNC: 31.8 G/DL (ref 31.4–37.4)
MCV RBC AUTO: 96 FL (ref 82–98)
MONOCYTES # BLD AUTO: 0.91 THOUSAND/ΜL (ref 0.17–1.22)
MONOCYTES NFR BLD AUTO: 6 % (ref 4–12)
NEUTROPHILS # BLD AUTO: 12.42 THOUSANDS/ΜL (ref 1.85–7.62)
NEUTS SEG NFR BLD AUTO: 88 % (ref 43–75)
NRBC BLD AUTO-RTO: 0 /100 WBCS
PHOSPHATE SERPL-MCNC: 2.1 MG/DL (ref 2.7–4.5)
PLATELET # BLD AUTO: 216 THOUSANDS/UL (ref 149–390)
PMV BLD AUTO: 11.2 FL (ref 8.9–12.7)
POTASSIUM SERPL-SCNC: 3.2 MMOL/L (ref 3.5–5.3)
RBC # BLD AUTO: 4.36 MILLION/UL (ref 3.88–5.62)
SODIUM SERPL-SCNC: 144 MMOL/L (ref 135–147)
WBC # BLD AUTO: 14.24 THOUSAND/UL (ref 4.31–10.16)

## 2024-10-13 PROCEDURE — 85025 COMPLETE CBC W/AUTO DIFF WBC: CPT

## 2024-10-13 PROCEDURE — 83735 ASSAY OF MAGNESIUM: CPT

## 2024-10-13 PROCEDURE — 94003 VENT MGMT INPAT SUBQ DAY: CPT

## 2024-10-13 PROCEDURE — NC001 PR NO CHARGE: Performed by: STUDENT IN AN ORGANIZED HEALTH CARE EDUCATION/TRAINING PROGRAM

## 2024-10-13 PROCEDURE — 94760 N-INVAS EAR/PLS OXIMETRY 1: CPT

## 2024-10-13 PROCEDURE — 94640 AIRWAY INHALATION TREATMENT: CPT

## 2024-10-13 PROCEDURE — 80048 BASIC METABOLIC PNL TOTAL CA: CPT

## 2024-10-13 PROCEDURE — 94150 VITAL CAPACITY TEST: CPT

## 2024-10-13 PROCEDURE — 82330 ASSAY OF CALCIUM: CPT

## 2024-10-13 PROCEDURE — 84100 ASSAY OF PHOSPHORUS: CPT

## 2024-10-13 PROCEDURE — 99232 SBSQ HOSP IP/OBS MODERATE 35: CPT | Performed by: STUDENT IN AN ORGANIZED HEALTH CARE EDUCATION/TRAINING PROGRAM

## 2024-10-13 RX ORDER — AMOXICILLIN 250 MG
2 CAPSULE ORAL 2 TIMES DAILY
Status: DISCONTINUED | OUTPATIENT
Start: 2024-10-13 | End: 2024-10-16

## 2024-10-13 RX ORDER — CALCIUM GLUCONATE 20 MG/ML
2 INJECTION, SOLUTION INTRAVENOUS ONCE
Status: COMPLETED | OUTPATIENT
Start: 2024-10-13 | End: 2024-10-13

## 2024-10-13 RX ORDER — POLYETHYLENE GLYCOL 3350 17 G/17G
17 POWDER, FOR SOLUTION ORAL DAILY
Status: DISCONTINUED | OUTPATIENT
Start: 2024-10-13 | End: 2024-10-16

## 2024-10-13 RX ORDER — FUROSEMIDE 10 MG/ML
40 INJECTION INTRAMUSCULAR; INTRAVENOUS ONCE
Status: COMPLETED | OUTPATIENT
Start: 2024-10-13 | End: 2024-10-13

## 2024-10-13 RX ORDER — MIDODRINE HYDROCHLORIDE 5 MG/1
15 TABLET ORAL EVERY 8 HOURS SCHEDULED
Status: DISCONTINUED | OUTPATIENT
Start: 2024-10-13 | End: 2024-10-16

## 2024-10-13 RX ADMIN — HYDROCORTISONE SODIUM SUCCINATE 50 MG: 100 INJECTION, POWDER, FOR SOLUTION INTRAMUSCULAR; INTRAVENOUS at 17:39

## 2024-10-13 RX ADMIN — PIPERACILLIN SODIUM AND TAZOBACTAM SODIUM 4.5 G: 36; 4.5 INJECTION, POWDER, LYOPHILIZED, FOR SOLUTION INTRAVENOUS at 17:00

## 2024-10-13 RX ADMIN — MIDODRINE HYDROCHLORIDE 15 MG: 5 TABLET ORAL at 14:27

## 2024-10-13 RX ADMIN — POLYETHYLENE GLYCOL 3350 17 G: 17 POWDER, FOR SOLUTION ORAL at 12:51

## 2024-10-13 RX ADMIN — HYDROCORTISONE SODIUM SUCCINATE 50 MG: 100 INJECTION, POWDER, FOR SOLUTION INTRAMUSCULAR; INTRAVENOUS at 11:56

## 2024-10-13 RX ADMIN — SENNOSIDES AND DOCUSATE SODIUM 2 TABLET: 8.6; 5 TABLET ORAL at 17:38

## 2024-10-13 RX ADMIN — LEVALBUTEROL HYDROCHLORIDE 1.25 MG: 1.25 SOLUTION RESPIRATORY (INHALATION) at 08:00

## 2024-10-13 RX ADMIN — MIDODRINE HYDROCHLORIDE 10 MG: 5 TABLET ORAL at 06:02

## 2024-10-13 RX ADMIN — HYDROCORTISONE SODIUM SUCCINATE 50 MG: 100 INJECTION, POWDER, FOR SOLUTION INTRAMUSCULAR; INTRAVENOUS at 00:24

## 2024-10-13 RX ADMIN — LEVALBUTEROL HYDROCHLORIDE 1.25 MG: 1.25 SOLUTION RESPIRATORY (INHALATION) at 19:20

## 2024-10-13 RX ADMIN — QUETIAPINE FUMARATE 25 MG: 25 TABLET ORAL at 21:33

## 2024-10-13 RX ADMIN — LEVALBUTEROL HYDROCHLORIDE 1.25 MG: 1.25 SOLUTION RESPIRATORY (INHALATION) at 02:47

## 2024-10-13 RX ADMIN — PIPERACILLIN SODIUM AND TAZOBACTAM SODIUM 4.5 G: 36; 4.5 INJECTION, POWDER, LYOPHILIZED, FOR SOLUTION INTRAVENOUS at 00:24

## 2024-10-13 RX ADMIN — LORAZEPAM 2 MG: 2 INJECTION INTRAMUSCULAR; INTRAVENOUS at 06:28

## 2024-10-13 RX ADMIN — PIPERACILLIN SODIUM AND TAZOBACTAM SODIUM 4.5 G: 36; 4.5 INJECTION, POWDER, LYOPHILIZED, FOR SOLUTION INTRAVENOUS at 09:00

## 2024-10-13 RX ADMIN — SENNOSIDES AND DOCUSATE SODIUM 1 TABLET: 8.6; 5 TABLET ORAL at 08:53

## 2024-10-13 RX ADMIN — POTASSIUM PHOSPHATE, MONOBASIC AND POTASSIUM PHOSPHATE, DIBASIC 30 MMOL: 224; 236 INJECTION, SOLUTION, CONCENTRATE INTRAVENOUS at 12:50

## 2024-10-13 RX ADMIN — NOREPINEPHRINE BITARTRATE 2 MCG/MIN: 1 INJECTION INTRAVENOUS at 20:35

## 2024-10-13 RX ADMIN — APIXABAN 5 MG: 5 TABLET, FILM COATED ORAL at 17:40

## 2024-10-13 RX ADMIN — CHLORHEXIDINE GLUCONATE 15 ML: 1.2 RINSE ORAL at 20:47

## 2024-10-13 RX ADMIN — MIDODRINE HYDROCHLORIDE 15 MG: 5 TABLET ORAL at 21:33

## 2024-10-13 RX ADMIN — HYDROCORTISONE SODIUM SUCCINATE 50 MG: 100 INJECTION, POWDER, FOR SOLUTION INTRAMUSCULAR; INTRAVENOUS at 06:02

## 2024-10-13 RX ADMIN — CHLORHEXIDINE GLUCONATE 15 ML: 1.2 RINSE ORAL at 08:53

## 2024-10-13 RX ADMIN — LORAZEPAM 2 MG: 2 INJECTION INTRAMUSCULAR; INTRAVENOUS at 11:55

## 2024-10-13 RX ADMIN — FLUDROCORTISONE ACETATE 0.05 MG: 0.1 TABLET ORAL at 08:57

## 2024-10-13 RX ADMIN — CALCIUM GLUCONATE 2 G: 20 INJECTION, SOLUTION INTRAVENOUS at 12:55

## 2024-10-13 RX ADMIN — FUROSEMIDE 40 MG: 10 INJECTION, SOLUTION INTRAMUSCULAR; INTRAVENOUS at 12:51

## 2024-10-13 RX ADMIN — LEVALBUTEROL HYDROCHLORIDE 1.25 MG: 1.25 SOLUTION RESPIRATORY (INHALATION) at 13:53

## 2024-10-13 RX ADMIN — APIXABAN 5 MG: 5 TABLET, FILM COATED ORAL at 08:53

## 2024-10-13 NOTE — ASSESSMENT & PLAN NOTE
Neuromuscular disease post chemotherapyS/P tracheostomy in 9/2023.  Vent dependent 24/7: current vent settings 14/500/6/60  Last bronchoscopy 6/1 with cultures growing serratia and pseudomonas.  Chest X-ray 10/5: Left sided opacification with possible mucus plugging.  CXR 10/7: Near complete opacification of the left hemithorax with leftward mediastinal shift, mildly improved from 10/5/2024, suggesting baseline underlying atelectasis and pleural effusion.   Chest x-ray 10/9 seems improved from prior.    Plan:  Continue vent support  Xopnex nebs TID  Continue mucomyst

## 2024-10-13 NOTE — ASSESSMENT & PLAN NOTE
Repeat episode of seizure like activity this morning.  No evidence of seizure on EEG.  Episodes relieved by ativan  Consultation with PM&R for recommendations regarding rehab.

## 2024-10-13 NOTE — ASSESSMENT & PLAN NOTE
Hypernatremia with hyperchloremia, likely in the setting of dehydration.  Sodium level had been difficult to control.  Now in the mid 140s  +17 L since admission.  Seems to have some component of peripheral edema    Plan:  Continue 200 cc flushes and 100 cc/hr maintenance.  Labs daily now.  Will diurese with Lasix 40 mg IV today.

## 2024-10-13 NOTE — RESPIRATORY THERAPY NOTE
10/13/24 0800   Inhalation Therapy Tx   $ Inhalation Therapy Performed Yes   $ Pulse Oximetry Spot Check Charge Completed   SpO2 99 %   Pre-Treatment Pulse 70   Pre-Treatment Respirations 18   Duration 30   Breath Sounds Pre-Treatment Bilateral Diminished   Breath Sounds Post-Treatment Bilateral Diminished   Post-Treatment Pulse 73   Post-Treatment Respirations 18   Delivery Source Aerogen;Vent   Position Semi Clay's   Treatment Tolerance Tolerated well   Resp Comments Lavaged and Sx pt for small yellow thick secretions. Trach Care Done and Inner Cannula Changed. Attempted SBT. Pt unable to Tolerate due to low VT and RR less than 8 despite increasing PS to 10   Oral Suctioning/Secretions   Suction Type Oral   Suction Device Yankauer   Secretion Amount Large   Secretion Color Clear   Secretion Consistency Thick   Suction Tolerance Tolerated well   Suctioning Adverse Effects None   Airway Suctioning/Secretions   Suction Type Tracheal   Suction Device Inline   Secretion Amount Small   Secretion Color Yellow   Secretion Consistency Thick   Suction Tolerance Tolerated well

## 2024-10-13 NOTE — PLAN OF CARE
Problem: Prexisting or High Potential for Compromised Skin Integrity  Goal: Skin integrity is maintained or improved  Description: INTERVENTIONS:  - Identify patients at risk for skin breakdown  - Assess and monitor skin integrity  - Assess and monitor nutrition and hydration status  - Monitor labs   - Assess for incontinence   - Turn and reposition patient  - Assist with mobility/ambulation  - Relieve pressure over bony prominences  - Avoid friction and shearing  - Provide appropriate hygiene as needed including keeping skin clean and dry  - Evaluate need for skin moisturizer/barrier cream  - Collaborate with interdisciplinary team   - Patient/family teaching  - Consider wound care consult   Outcome: Progressing     Problem: PAIN - ADULT  Goal: Verbalizes/displays adequate comfort level or baseline comfort level  Description: Interventions:  - Encourage patient to monitor pain and request assistance  - Assess pain using appropriate pain scale  - Administer analgesics based on type and severity of pain and evaluate response  - Implement non-pharmacological measures as appropriate and evaluate response  - Consider cultural and social influences on pain and pain management  - Notify physician/advanced practitioner if interventions unsuccessful or patient reports new pain  Outcome: Progressing     Problem: INFECTION - ADULT  Goal: Absence or prevention of progression during hospitalization  Description: INTERVENTIONS:  - Assess and monitor for signs and symptoms of infection  - Monitor lab/diagnostic results  - Monitor all insertion sites, i.e. indwelling lines, tubes, and drains  - Monitor endotracheal if appropriate and nasal secretions for changes in amount and color  - Perry appropriate cooling/warming therapies per order  - Administer medications as ordered  - Instruct and encourage patient and family to use good hand hygiene technique  - Identify and instruct in appropriate isolation precautions for  identified infection/condition  Outcome: Progressing  Goal: Absence of fever/infection during neutropenic period  Description: INTERVENTIONS:  - Monitor WBC    Outcome: Progressing     Problem: SAFETY ADULT  Goal: Patient will remain free of falls  Description: INTERVENTIONS:  - Educate patient/family on patient safety including physical limitations  - Instruct patient to call for assistance with activity   - Consult OT/PT to assist with strengthening/mobility   - Keep Call bell within reach  - Keep bed low and locked with side rails adjusted as appropriate  - Keep care items and personal belongings within reach  - Initiate and maintain comfort rounds  - Make Fall Risk Sign visible to staff  - Apply yellow socks and bracelet for high fall risk patients  - Consider moving patient to room near nurses station  Outcome: Progressing  Goal: Maintain or return to baseline ADL function  Description: INTERVENTIONS:  -  Assess patient's ability to carry out ADLs; assess patient's baseline for ADL function and identify physical deficits which impact ability to perform ADLs (bathing, care of mouth/teeth, toileting, grooming, dressing, etc.)  - Assess/evaluate cause of self-care deficits   - Assess range of motion  - Assess patient's mobility; develop plan if impaired  - Assess patient's need for assistive devices and provide as appropriate  - Encourage maximum independence but intervene and supervise when necessary  - Involve family in performance of ADLs  - Assess for home care needs following discharge   - Consider OT consult to assist with ADL evaluation and planning for discharge  - Provide patient education as appropriate  Outcome: Progressing  Goal: Maintains/Returns to pre admission functional level  Description: INTERVENTIONS:  - Perform AM-PAC 6 Click Basic Mobility/ Daily Activity assessment daily.  - Set and communicate daily mobility goal to care team and patient/family/caregiver.   - Out of bed for toileting  -  Record patient progress and toleration of activity level   Outcome: Progressing     Problem: DISCHARGE PLANNING  Goal: Discharge to home or other facility with appropriate resources  Description: INTERVENTIONS:  - Identify barriers to discharge w/patient and caregiver  - Arrange for needed discharge resources and transportation as appropriate  - Identify discharge learning needs (meds, wound care, etc.)  - Arrange for interpretive services to assist at discharge as needed  - Refer to Case Management Department for coordinating discharge planning if the patient needs post-hospital services based on physician/advanced practitioner order or complex needs related to functional status, cognitive ability, or social support system  Outcome: Progressing     Problem: Knowledge Deficit  Goal: Patient/family/caregiver demonstrates understanding of disease process, treatment plan, medications, and discharge instructions  Description: Complete learning assessment and assess knowledge base.  Interventions:  - Provide teaching at level of understanding  - Provide teaching via preferred learning methods  Outcome: Progressing     Problem: SAFETY,RESTRAINT: NV/NON-SELF DESTRUCTIVE BEHAVIOR  Goal: Remains free of harm/injury (restraint for non violent/non self-detsructive behavior)  Description: INTERVENTIONS:  - Instruct patient/family regarding restraint use   - Assess and monitor physiologic and psychological status   - Provide interventions and comfort measures to meet assessed patient needs   - Identify and implement measures to help patient regain control  - Assess readiness for release of restraint   Outcome: Progressing  Goal: Returns to optimal restraint-free functioning  Description: INTERVENTIONS:  - Assess the patient's behavior and symptoms that indicate continued need for restraint  - Identify and implement measures to help patient regain control  - Assess readiness for release of restraint   Outcome: Progressing      Problem: Nutrition/Hydration-ADULT  Goal: Nutrient/Hydration intake appropriate for improving, restoring or maintaining nutritional needs  Description: Monitor and assess patient's nutrition/hydration status for malnutrition. Collaborate with interdisciplinary team and initiate plan and interventions as ordered.  Monitor patient's weight and dietary intake as ordered or per policy. Utilize nutrition screening tool and intervene as necessary. Determine patient's food preferences and provide high-protein, high-caloric foods as appropriate.     INTERVENTIONS:  - Monitor oral intake, urinary output, labs, and treatment plans  - Assess nutrition and hydration status and recommend course of action  - Evaluate amount of meals eaten  - Assist patient with eating if necessary   - Allow adequate time for meals  - Recommend/ encourage appropriate diets, oral nutritional supplements, and vitamin/mineral supplements  - Order, calculate, and assess calorie counts as needed  - Recommend, monitor, and adjust tube feedings and TPN/PPN based on assessed needs  - Assess need for intravenous fluids  - Provide specific nutrition/hydration education as appropriate  - Include patient/family/caregiver in decisions related to nutrition  Outcome: Progressing

## 2024-10-13 NOTE — ASSESSMENT & PLAN NOTE
[facility]: febrile, tachycardic, with leukocytosis and a lactate of 12.  X-ray with left sided opacification, increased secretions from tracheostomy.    Repeat lab work upon arrival show persistent leukocytosis and hypernatremia.  Urine strep and Legionella negative  Bronc culture revealed Proteus and Pseudomonas    Plan:  Continue Zosyn

## 2024-10-13 NOTE — ASSESSMENT & PLAN NOTE
Likely vagal response to coughing.  Spontaneously resolves without intervention.  Was initially started on dopamine with heart rate goal of 60.  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  Resolves with ativan.    Plan:  Increase midodrine to 15 mg every 8  Continue hydrocortisone 50 mg every 6  Continue  fludrocortisone 0.05 mg daily.  Will continue to monitor.

## 2024-10-13 NOTE — OCCUPATIONAL THERAPY NOTE
Occupational Therapy Cancellation Note        Patient Name: Hemanth Swanson  Today's Date: 10/13/2024           10/13/24 1015   Note Type   Note type Cancelled Session   Cancel Reasons Medical status   Additional Comments OT orders previously received. Chart review completed. JEAN-PAUL Rich reports that pt continued to be inappropriate for participation in therapy, will hold at this time and continue to follow as appropriate/able     Marisol Marino MS, OTR/L

## 2024-10-13 NOTE — PHYSICAL THERAPY NOTE
PHYSICAL THERAPY CANCELLATION NOTE          Patient Name: Hemanth Swanson  Today's Date: 10/13/2024             10/13/24 1013   Note Type   Note type Cancelled Session   Cancel Reasons Medical status   Additional Comments PT eval orders received, chart review performed. Spoke to JEAN-PAUL Rich who report pt is not medically appropriate for PT eval at this time. PT will continue to follow pt as appropriate and as schedule allows.         Mile Maciel, PT, DPT  10/13/24

## 2024-10-13 NOTE — PROGRESS NOTES
Progress Note - Critical Care/ICU   Name: Hemanth Swanson 39 y.o. male I MRN: [id number]  Unit/Bed#: ICU 05 I Date of Admission: 10/5/2024   Date of Service: 10/13/2024 I Hospital Day: 8      Assessment & Plan  Bradycardia  Likely vagal response to coughing.  Spontaneously resolves without intervention.  Was initially started on dopamine with heart rate goal of 60.  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  Resolves with ativan.    Plan:  Increase midodrine to 15 mg every 8  Continue hydrocortisone 50 mg every 6  Continue  fludrocortisone 0.05 mg daily.  Will continue to monitor.  Hypernatremia  Hypernatremia with hyperchloremia, likely in the setting of dehydration.  Sodium level had been difficult to control.  Now in the mid 140s  +17 L since admission.  Seems to have some component of peripheral edema    Plan:  Continue 200 cc flushes and 100 cc/hr maintenance.  Labs daily now.  Will diurese with Lasix 40 mg IV today.  Sepsis ([facility])  [place] ED: febrile, tachycardic, with leukocytosis and a lactate of 12.  X-ray with left sided opacification, increased secretions from tracheostomy.    Repeat lab work upon arrival show persistent leukocytosis and hypernatremia.  Urine strep and Legionella negative  Bronc culture revealed Proteus and Pseudomonas    Plan:  Continue Zosyn  Chronic respiratory failure with hypoxia and hypercapnia ([facility])  Neuromuscular disease post chemotherapyS/P tracheostomy in 9/2023.  Vent dependent 24/7: current vent settings 14/500/6/60  Last bronchoscopy 6/1 with cultures growing serratia and pseudomonas.  Chest X-ray 10/5: Left sided opacification with possible mucus plugging.  CXR 10/7: Near complete opacification of the left hemithorax with leftward mediastinal shift, mildly improved from 10/5/2024, suggesting baseline underlying atelectasis and pleural effusion.   Chest x-ray 10/9 seems improved from  prior.    Plan:  Continue vent support  Xopnex nebs TID  Continue mucomyst  Seizure-like activity (HCC)  Repeat episode of seizure like activity this morning.  No evidence of seizure on EEG.  Episodes relieved by ativan  Consultation with PM&R for recommendations regarding rehab.  Toxic metabolic encephalopathy  Likely related to hypoxia 2/2 atelectasis vs mucus plugging.  Continue to monitor for clinical response to Abx  Continue supportive care.  Umbilical hernia without obstruction and without gangrene  Evaluated by surgery.  Found to not be a surgical candidate due to ongoing comorbidities.  Palliative care by specialist  Palliative care is following.  They will continue to engage with family regarding goals of care.  Disposition: Critical care    ICU Core Measures     Vented Patient  VAP Bundle  VAP bundle ordered     A: Assess, Prevent, and Manage Pain Has pain been assessed? Yes  Need for changes to pain regimen? Yes   B: Both Spontaneous Awakening Trials (SATs) and Spontaneous Breathing Trials (SBTs) Plan to perform spontaneous awakening trial today? Yes   Plan to perform spontaneous breathing trial today? Yes   Obvious barriers to extubation? Yes   C: Choice of Sedation RASS Goal: 0 Alert and Calm  Need for changes to sedation or analgesia regimen? Yes   D: Delirium CAM-ICU: Negative   E: Early Mobility  Plan for early mobility? Yes   F: Family Engagement Plan for family engagement today? Yes       Antibiotic Review: Continue broad spectrum secondary to severity of illness.     Review of Invasive Devices:      Central access plan: Patient has multiple central venous catheters.       Prophylaxis:  VTE VTE covered by:  apixaban, Per PEG Tube, 5 mg at 10/13/24 0870       Stress Ulcer  not ordered         24 Hour Events : Patient has been suffering from frequent episodes of apnea with bearing down.  During these episodes he will desaturate to 40% and cause peak pressuring on the vent.  These episodes are  relieved by bagging him manually as well as Ativan 2 mg.  Subjective   Review of Systems: See HPI for Review of Systems    Objective :                   Vitals I/O      Most Recent Min/Max in 24hrs   Temp 98.2 °F (36.8 °C) Temp  Min: 97.7 °F (36.5 °C)  Max: 98.6 °F (37 °C)   Pulse 64 Pulse  Min: 59  Max: 82   Resp 18 Resp  Min: 18  Max: 18   /85 BP  Min: 90/52  Max: 159/91   O2 Sat 96 % SpO2  Min: 91 %  Max: 99 %      Intake/Output Summary (Last 24 hours) at 10/13/2024 1255  Last data filed at 10/13/2024 0618  Gross per 24 hour   Intake 3792.55 ml   Output 4250 ml   Net -457.45 ml       Diet Enteral/Parenteral; Tube Feeding No Oral Diet; Jevity 1.5; Continuous; 70; 200; Water; Every 4 hours    Invasive Monitoring           Physical Exam   Physical Exam  Vitals reviewed.   Eyes:      General: No scleral icterus.        Right eye: No discharge.         Left eye: No discharge.   Skin:     General: Skin is cool.      Capillary Refill: Capillary refill takes less than 2 seconds.   HENT:      Head: Normocephalic and atraumatic.      Mouth/Throat:      Mouth: Mucous membranes are moist.      Comments: Profuse saliva from mouth.  Cardiovascular:      Rate and Rhythm: Normal rate and regular rhythm.      Heart sounds: Normal heart sounds.   Musculoskeletal:      Right lower le+ Edema present.      Left lower le+ Edema present.   Abdominal:      Palpations: Abdomen is soft.      Tenderness: There is no abdominal tenderness.   Constitutional:       General: He is in acute distress.      Appearance: He is well-developed. He is ill-appearing, toxic-appearing and diaphoretic.      Interventions: He is sedated, intubated and restrained.   Pulmonary:      Effort: He is intubated.      Breath sounds: No wheezing, rhonchi or rales.   Neurological:      Mental Status: He is agitated.      Motor: Strength full and intact in all extremities.   Genitourinary/Anorectal:  Sheppard present.        Diagnostic Studies        Lab  Results: I have reviewed the following results:     Medications:  Scheduled PRN   apixaban, 5 mg, BID  calcium gluconate, 2 g, Once  chlorhexidine, 15 mL, Q12H ISAEL  fludrocortisone, 0.05 mg, Daily  hydrocortisone sodium succinate, 50 mg, Q6H ISAEL  levalbuterol, 1.25 mg, Q6H  midodrine, 15 mg, Q8H ISAEL  piperacillin-tazobactam, 4.5 g, Q8H  polyethylene glycol, 17 g, Daily  potassium phosphate, 30 mmol, Once  QUEtiapine, 25 mg, HS  senna-docusate sodium, 2 tablet, BID      acetaminophen, 650 mg, Q6H PRN  bisacodyl, 10 mg, Daily PRN  LORazepam, 2 mg, Q4H PRN       Continuous    norepinephrine, 1-30 mcg/min, Last Rate: 3 mcg/min (10/13/24 0442)         Labs:   CBC    Recent Labs     10/12/24  0517 10/13/24  0606   WBC 14.60* 14.24*   HGB 12.5 13.3   HCT 40.0 41.8    216     BMP    Recent Labs     10/12/24  0517 10/13/24  0606   SODIUM 141 144   K 3.9 3.2*    102   CO2 29 35*   AGAP 7 7   BUN 6 11   CREATININE 0.65 0.78   CALCIUM 8.2* 8.1*       Coags    No recent results     Additional Electrolytes  Recent Labs     10/12/24  0517 10/13/24  0606   MG 2.2 2.1   PHOS 1.8* 2.1*   CAIONIZED 1.06* 1.07*          Blood Gas    No recent results  No recent results   LFTs  No recent results    Infectious  Recent Labs     10/12/24  0517   PROCALCITONI 0.38*     Glucose  Recent Labs     10/12/24  0517 10/13/24  0606   GLUC 134 138

## 2024-10-13 NOTE — CONSULTS
"PHYSICAL MEDICINE AND REHABILITATION CONSULT NOTE  Hemanth Swanson 39 y.o. male MRN: [id number]  Unit/Bed#: ICU 05 Encounter: [id number]    Requested by:   Lesvia Wood PA-C   Reason for Consultation: Chronic resp failure, seizure like episode     Assessment and Recommendations:  39-year-old male with a past medical history of metastatic left testicular seminoma, status post left orchiectomy, chemotherapy, hypertension, anxiety, bipolar disorder, who developed hearing loss possible left-sided vestibular schwannoma, neuromuscular weakness, diplopia, bowel bladder incontinence following chemotherapy which was stopped.  Patient had extended hospital stay in August to September 2023 for this which also included respiratory failure requiring trach and PEG pulmonary embolism for which patient continues chronic trach/PEG status.  Patient also had hospitalization 5/2024 for multilobar PNA, hypoxic bradycardia felt 2/2 vasal vagal stimulation increased secretions and position of trach with mucous plugging c/b cardiac arrest with pulseless asystole without hypoxia, seizure like actiity felt to not be seizure activity helped apparently with managing secretions and keeping O2 above 90%.  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  There was no no electrodiagnostic evidence to support a diffuse neurogenic process such as motor neuron disease or a myopathic process affecting the peripheral nervous system.  Last OP neuro note 7/2024, \"given the lack of an obvious cause of his respiratory failure it is reasonable to consider neuromuscular disease. However, there is no evidence that this is the case. " "Specifically, there is nothing to suggest primary muscle disease, primary nerve disease, or neuromuscular junction disorders.\"     Of note,  patient was eating and drinking orally and they would not provide PEG tube feeds if eating or drinking adequately.  Patient would not drink plain water and drank mostly flavored drinks and sometimes soda per family.     Per family he had slow decline in function but overall was stable last few months at home with them managing him with trach/peg status.  He did have some hot flashes and sweating at home and was seen by urology late Aug and was started on IM testosterone biweekly for over a month.  He was noted to have increased agitation/restlessness for a week or so (per family he did not have these prior) and then significant more agitation and anger and was recommended to hold testosterone with last dose 9/30.        On 10/5, patient apparently pulled out PEG during bout of agitation and presented to hospital where G tube was replaced but he was noted to be hypoxic with copious secretions and febrile.  He required adjustments to ventilation.  He was found to have complete L sided consolidation along with severe hypernatremia 176.  Patient was treated for PNA and sepsis with Abx.  Patient bronch culture + Proteus and pseudomonas and remains on Zosyn.  Leukocytosis improved 10/14.  Patient received IVF and nephro consulted for hypernatremia which was felt to be 2/2 dehydration which has been improving.  He had episode of rhythmic body jerking for several minutes on 10/10 and additional shaking episodes with hypoxia and bradycardia with hypotension requiring pressors.  He was placed on vEEG which captures some of these shaking spells which did not correlate with EEG seizures.  Neuro felt spells may be due to vagal response and possibly convulsive syncope in setting of hypoxia/bradycardia with possible functional component.  They did not feel he needed AED.        Prior Level of " Function and Social history:    Patient lives at home with family - sister Dmitry and Aunt Magaly who provide caregiving.  He was able to move arms and legs but was weak throughout.  He was able to type on computer and phone and follow simple commands and voice words.  Patient was eating and drinking orally and they would not provide PEG tube feeds if eating or drinking adequately.  Patient would not drink plain water and drank mostly flavored drinks and sometimes soda.  He could help some with dressing.  He is chronically incontinent of b/b but would notifiy CG's/family when he went.  Overall though was dependent with ADLs and mobility; WVC, Wallace lift, Electric recliner; In past he could get to commode but not last several months.      Current Level of Function:    Dependent    Acute on Chronic respiratory failure with hypoxia  Neuromuscular weakness  Sepsis  AMS/encephalopathy  Diaphoresis   Hypotension  Bradycardia  Elevated Testosterone; hx of hypogonadism   Metastatic left testicular seminoma, status post left orchiectomy, chemotherapy  Trach dependent  PEG status  Hx of PE  Pneumonia  Hypernatremia  Seizure like episodes  Bowel and bladder dysfunction   Agitation, restlessness, insomnia, hx of anxiety and bipolar  Encounter for skin care    Dispo rec:  Home with family/24-7 caregivers and  PT, OT, RN v SNF/LTACH (which family does not want) pending  - Medical stability    - Stabilized cardiopulm/neuro status   - with improved episodes of resp distress on vent with diaphoresis and agitation   - patient remains more confused than baseline and is weaker throughout    - ideally on less medications providing BP support   - Ensure family education on appropriate fluid boluses/PEG tube plan - he had been largely eating and drinking by mouth prior to this and not using fluids boluses or tube feeds much    Acute on Chronic respiratory failure with hypoxia on chronic vent, Sepsis, PNA  - 10/5 P/w complete opacification  "of L hemithorax, hypoxic with copious secretions, fever following increasing bouts of agitation and pulling out peg in context of recent initiation of IM testosterone (unclear relation)  - On Zosyn for PNA/sepsis (IV abx since 10/5) > WBC trended down, pt afebrile but still hypotensive (was not on PO midodrine/florinef at home)   - he had last available swallow  studies in 10 and 11/2023 and were unremarkable and family has been providing oral feeds/oral fluids mostly at home   - 5/2024 hospitalization for multilobar PNA, hypoxic bradycardia felt 2/2 vasal vagal stimulation increased secretions and position of trach with mucous plugging c/b cardiac arrest with pulseless asystole without hypoxia, seizure like actiity felt to not be seizure activity helped apparently with managing secretions and keeping O2 above 90%  - Ensure appropriate mgmt of secretions and trach positioning   - Currently lethargic and NPO status/TF only - Ensure SLP eval and MBS study prior to any future considerations for oral intake   - 10/14 Having intermittent bouts of increased PIP - sometime corresponding to diaphoresis and possibly \"fighting vent\"/anxiety, agitation per discussion with staff; had elevated PIP/low VT improved with suction of thick yellow mucous 10/14; did receive IV ativan with brief improvement earlier in day; oxy 5 x1 with little improvement   - Repeat CXR 10/14 pending; prior CXRs showing L basilar effusion and atelectasis    - CT C 10/11 - Moderate bibasilar pulmonary opacification consistent with atelectasis; superimposed pneumonia not entirely excludable. Trace left greater than right pleural effusions.   - Last CTPE study 5/30 negative for PE   - Consider TTE for refractory hypotension   - On my eval, patient had transient episode of significant diaphoresis, dilated pupils, firm stomach, and resp distress with elevated PIP; flaccid tone overall, generalized weakness, could track and move all extremities some to " command but not consistently; HR mostly 60s, without significant hypoxia   - Overall sedation per CCM   - Seroquel 25mg HS    - Currently Ativan 2mg Q4H PRN > reasonable but care with AMS   - Oxy 5mg Q6H scheduled > reasonable to try but care with hypotension and AMS    - Consider gabapentin 100mg TID and titrate to 300mg TID - limited other options with ongoing hypotension/AMS  - Treated recently for PNA, possible atelectasis - Zosyn - WBC improved 10/14  - On multiple agents for BP support - (levophed stopped recently), Midodrine, florinef, (hydrocortisone now stopped)   - Chronic resp failure etiology uncertain - neuro work-up inconclusive   - Overall mgmt and sedation per [facility] - fentanyl stopped 10/12, receiving only Seroquel 25mg HS, 2mg Ativan Q4H PRN 2 doses 10/13 and 1 dose 10/14 sofar   - Monitor vent values, other vitals, labs, lung exam, overall exam, secretions closely  - Suction PRN when indicated  - Monitor for ventilator associated complications    Neuromuscular weakness, AMS/encephalopathy   - Patient's weakness throughout sounds worse than baseline and is more confused from baseline - risk of encephalopathy or other cause as well as progression of underlying condition of uncertain etiology  - Requires 24-7 caregivers and dependent with ADLs and mobility   - flaccid tone overall, generalized weakness, could track and move all extremities some to command but not consistently   - Consider MRI brain if able to perform with ongoing encephalopathy and weakness   - Monitor neuro exam and cosider  - While patient at current functional level would also focus on prevention or improvement of complications (malnutrition, dehydration, PNA, atelectasis, VTE, constipation/obstruction, urinary retention, UTI, ulcerations, contractures).  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  There was no no electrodiagnostic evidence to support a diffuse neurogenic process such as motor neuron disease or a myopathic process affecting the peripheral nervous system.  Last OP neuro note 7/2024, \"given the lack of an obvious cause of his respiratory failure it is reasonable to consider neuromuscular disease. However, there is no evidence that this is the case. Specifically, there is nothing to suggest primary muscle disease, primary nerve disease, or neuromuscular junction disorders.\"     Hypotension, bradycardia   - Prior hospitalization 5/2024 for multilobar PNA \"noted hypoxic bradycardia felt 2/2 vasal vagal stimulation increased secretions and position of trach with mucous plugging\"  - Ensure appropriate mgmt of secretions and trach positioning   - On multiple agents for BP support - (levophed stopped recently), Midodrine 15mg TID, florinef 0.05mg qday, (hydrocortisone stopped now too); ensure adequate hydration (BUN/Cr appropriate, sodium improved) - was not on these at home  - Flaccid tone mostly, did have some dilated reactive pupils during episode of diaphoresis and resp distress on vent - presentation and hx not c/w traditional autonomic storming  - Ensure mgmt of infection or other causes   - On Zosyn for PNA/sepsis (IV abx since 10/5) > WBC trended down, pt afebrile but still hypotensive (was not on PO midodrine/florinef at home)   - Consider TTE to monitor for cardiomyopathy; also hx of PE   - If " work-up negative consider autonomic failure related to possible neuromuscular d/o of uncertain etiology; Consider endo c/s    - Fluids - 200 cc free water Q4H ; TFs    Elevated Testosterone (Endocrinopathy); hx of hypogonadism   - Patient received T Cypionate 100mg IM biweekly for over a month with last dose 9/30 for hypogonadism earlier this month (T level prior 19) - this is long lasting T formulation    - 10/11 FT >50, Total T >1500 (Max) - still fairly high level for 11 days later (half life 8-12 d but can stay in body for several weeks gradually decreasing  - Gynecomastia  (can be conversion of T to E, steroids) - can lead to MI, CVA, heart failure, polycythemia, mood swings, agitation, anxiety, depression, tendon injury, sleep apnea, DM, Wt gain, cog decline  - Query if high T level cause bouts of agitation/aggression recently at home which is possible based on hx from family  - Consider retesting T level in about 1 week and monitoring for gradual improving   - Mildly elevated prolactin   - FSH, LH both low; AFP, hCG negative  - He did have some hot flashes and sweating at home and was seen by urology late Aug and was started on IM testosterone biweekly for over a month.  He was noted to have increased agitation/restlessness for a week or so (per family he did not have these prior) and then significant more agitation and anger and was recommended to hold testosterone with last dose 9/30.      - Consider Endocrinology consult    Metastatic left testicular seminoma, status post left orchiectomy, chemotherapy  - Work-up and mgmt per CCM/H-O  - AFP, hCG negative  - CT C/A/P without suspicious findings     PEG status  - Monitor site, bowel function  - TF's per primary     Hypernatremia  - Improved felt to be lack of free water/dehydration per nephro; treated sepsis/PNA on admission  - Per family, patient was eating and drinking orally and they would not provide PEG tube feeds if eating or drinking adequately.   "Patient would not drink plain water and drank mostly flavored drinks and sometimes soda per family.   - Fluid boluses and Tfs per CCM/nutrition   - Ensure caregiver education on appropriate intake/flushes and monitoring after d/c   - Monitor  - On 200 ml fluid boluses Q4H which is more than last nutrition note 10/7 > which would give  Close to 2500 ml water/d if getting full Tfs and flushes with meds;   - \"continuous tube feeds of Jevity 1.5 @ 70 ml/hr. Water flushes of 150 ml q 4 hrs. Start at 20 ml/hr, advance by 10 ml q 4 hrs as tolerated until goal rate is reached. Defer additional water flush adjustments to critical care d/t hypernatremia.At goal EN regimen provides 2520 kcals, 107 g protein, and 2177 ml total water from formula + flushes;     Seizure like episodes  - Neuro felt spells may be due to vagal response and possibly convulsive syncope in setting of hypoxia/bradycardia with possible functional component.  They did not feel he needed AED.    - episode of rhythmic body jerking for several minutes on 10/10 and additional shaking episodes with hypoxia and bradycardia with hypotension requiring pressors.  He was placed on vEEG which captures some of these shaking spells which did not correlate with EEG seizure  -5/2024 hospitalization - also had seizure like actiity felt to not be seizure activity helped apparently with \"managing secretions and keeping O2 above 90%\"  - Consider gabapentin 100mg TID and titrate to 300mg TID - limited other options with ongoing hypotension/AMS    Hx of PE  - On Eliquis; Last CTPE 5/2024 negative     Bowel and bladder dysfunction  - has been chronically incontinent since after chemo per family; he usually is able to tell them when he is incontinent and they have been able to maintain hygiene   - Sheppard in place currently  - Incontinent stool 10/14  - Ensure adequate hygiene  - Monitor for diarrhea/constipation     Agitation, restlessness, insomnia, hx of anxiety and " "bipolar  (Recently administered T IM with worsening symptoms at home)  - Seroquel 25mg HS  - Ativan 2mg Q4H PRN   - Oxy 5mg Q6H   - Consider gabapentin 100mg TID and uptitrate to 300mg if appropriate     Encounter for skin care  - Increased risk of skin wounds/breakdown/rashes due to recent immobility and co-morbidities   - Turn patient Q2H in bed (use pillows or wedges)  - Patient and if available caregiver education   - Hydragaurd to buttocks and sacrum BID & PRN  - Allevyn foam to heels and float heels when in bed   - Optimal bowel/bladder hygiene; keep skin clean and dry   - EHOB waffle cushion to chair/WC when OOB  - Nursing to document in chart and Notify MD if buttock, sacrum, heel, or other skin site develops erythema, skin breakdown, or rashes as soon as possible.  If patient is soiling themselves with urine or stool notify MD.  If you are unable to maintain skin integrity and prevent erythema due to frequency of soiling notify MD as soon as possible as well.     VTE prophylaxis   As outlined by primary team      Other medical issues:     Per primary service (and relevant consultants if applicable)        ==================================================================    Chief Complaint:  Resp failure     History of Present Illness:   39-year-old male with a past medical history of metastatic left testicular seminoma, status post left orchiectomy, chemotherapy, hypertension, anxiety, bipolar disorder, who developed hearing loss possible left-sided vestibular schwannoma, neuromuscular weakness, diplopia, bowel bladder incontinence following chemotherapy which was stopped.  Patient had extended hospital stay in August to September 2023 for this which also included respiratory failure requiring trach and PEG pulmonary embolism for which patient continues chronic trach/PEG status.  Patient also had cardiac arrest in 5/20/2024.  EMG 9/2023 showed \"generalized diffuse demyelinating and axonal sensory >> motor " "polyneuropathy.\"  Last NCS/EMG on 7/2024 showed upper extremity axonal sensorimotor neuropathy, though sensory responses in the lower extremities were normal with incidental findings the presence of chronic C5-6 radiculopathy in the right upper extremity, without ongoing denervation.  There was no no electrodiagnostic evidence to support a diffuse neurogenic process such as motor neuron disease or a myopathic process affecting the peripheral nervous system.  Last OP neuro note 7/2024, \"given the lack of an obvious cause of his respiratory failure it is reasonable to consider neuromuscular disease. However, there is no evidence that this is the case. Specifically, there is nothing to suggest primary muscle disease, primary nerve disease, or neuromuscular junction disorders.\"     Patient apparently pulled out PEG and presented to hospital where G tube was replaced but he was noted to be hypoxic with copious secretions and febrile.  He required adjustments to ventilation.  He was found to have complete L sided consolidation along with severe hypernatremia 176.  Patient was treated for PNA and sepsis with Abx.  Patient bronch culture + Proteus and pseudomonas and remains on Zosyn.  Leukocytosis improved 10/14.  Patient received IVF and nephro consulted for hypernatremia which was felt to be 2/2 dehydration which has been improving.  He had episode of rhythmic body jerking for several minutes on 10/10 and additional shaking episodes with hypoxia and bradycardia with hypotension requiring pressors.  He was placed on vEEG which captures some of these shaking spells which did not correlate with EEG seizures.  Neuro felt spells may be due to vagal response and possibly convulsive syncope in setting of hypoxia/bradycardia with possible functional component.  They did not feel he needed AED.      On eval, patient laying in bed ill-appearing, diaphoretic, he is able to track in all directions for short periods and squeeze R>L " hand to command and slightly move all limbs to command some of the time.  He has poor attention.  He is not able to consistently answer to yes-no questions with head nodding or mouthing words.      Review of Systems:     ROS: Could not be adequately (or fully adequately be) obtained due to degree of impaired cognition/communication at time of encounter.    Allergies   Allergen Reactions    Dog Epithelium Cough, Sneezing and Nasal Congestion       Current Facility-Administered Medications:     acetaminophen (TYLENOL) oral suspension 650 mg, 650 mg, Per PEG Tube, Q6H PRN, Ne Low MD    apixaban (ELIQUIS) tablet 5 mg, 5 mg, Per PEG Tube, BID, Mohsen Moon MD, 5 mg at 10/13/24 0853    [COMPLETED] bisacodyl (DULCOLAX) rectal suppository 10 mg, 10 mg, Rectal, Once, 10 mg at 10/09/24 1153 **FOLLOWED BY** bisacodyl (DULCOLAX) rectal suppository 10 mg, 10 mg, Rectal, Daily PRN, Ne Low MD    calcium gluconate 2 g in sodium chloride 0.9% 100 mL (premix), 2 g, Intravenous, Once, Lesvia Wood PA-C    chlorhexidine (PERIDEX) 0.12 % oral rinse 15 mL, 15 mL, Mouth/Throat, Q12H ISAEL, Mohsen Moon MD, 15 mL at 10/13/24 0853    fludrocortisone (FLORINEF) tablet 0.05 mg, 0.05 mg, Per PEG Tube, Daily, Ne Low MD, 0.05 mg at 10/13/24 0857    hydrocortisone (Solu-CORTEF) injection 50 mg, 50 mg, Intravenous, Q6H [facility], Ne Low MD, 50 mg at 10/13/24 1156    levalbuterol (XOPENEX) inhalation solution 1.25 mg, 1.25 mg, Nebulization, Q6H, Silvio Alas MD, 1.25 mg at 10/13/24 0800    LORazepam (ATIVAN) injection 2 mg, 2 mg, Intravenous, Q4H PRN, Lesvia Wood PA-C, 2 mg at 10/13/24 1155    midodrine (PROAMATINE) tablet 10 mg, 10 mg, Oral, Q8H ISAEL, Ne Low MD, 10 mg at 10/13/24 0602    NOREPINEPHRINE 4 MG  ML NSS (CMPD ORDER) infusion, 1-30 mcg/min, Intravenous, Titrated, Silvio Alas MD, Last Rate: 11.3 mL/hr  at 10/13/24 0442, 3 mcg/min at 10/13/24 0442    piperacillin-tazobactam (ZOSYN) 4.5 g in sodium chloride 0.9 % 100 mL IVPB (EXTENDED INFUSION), 4.5 g, Intravenous, Q8H, Ne Low MD, Last Rate: 25 mL/hr at 10/13/24 0900, 4.5 g at 10/13/24 0900    polyethylene glycol (MIRALAX) packet 17 g, 17 g, Per PEG Tube, Daily PRN, Ne Low MD    potassium phosphates 30 mmol in sodium chloride 0.9 % 250 mL infusion, 30 mmol, Intravenous, Once, Lesvia Wood PA-C    QUEtiapine (SEROquel) tablet 25 mg, 25 mg, Oral, HS, Dominik Raman MD, 25 mg at 10/12/24 2127    senna-docusate sodium (SENOKOT S) 8.6-50 mg per tablet 1 tablet, 1 tablet, Per PEG Tube, BID, Ne Low MD, 1 tablet at 10/13/24 0853    Past Medical History:   Diagnosis Date    Anxiety     Cancer (HCC)     Depression     Migration of percutaneous endoscopic gastrostomy (PEG) tube (HCC) 09/24/2023    Psychiatric disorder     bipolar       Past Surgical History:   Procedure Laterality Date    IR BIOPSY LYMPH NODE  01/20/2023    IR GASTROSTOMY (G) TUBE CHECK/CHANGE/REINSERTION/UPSIZE  6/18/2024    IR GASTROSTOMY TUBE PLACEMENT  09/25/2023    IR LUMBAR PUNCTURE  09/06/2023    IR PORT PLACEMENT  03/14/2023    ORCHIECTOMY Left 12/14/2022    Procedure: ORCHIECTOMY INGUINAL;  Surgeon: Flip Michael MD;  Location:  MAIN OR;  Service: Urology    PEG W/TRACHEOSTOMY PLACEMENT N/A 09/08/2023    Procedure: TRACHEOSTOMY WITH INSERTION PEG TUBE;  Surgeon: Rudy Mcmanus MD;  Location: [facility];  Service: General    TESTICLE SURGERY        Family History   Problem Relation Age of Onset    Coronary artery disease Maternal Aunt     Cancer Father     Diabetes Father     Hypertension Father        Social History available currently in EMR:  (See additional SH as outlined above)   Social History     Socioeconomic History    Marital status: Single     Spouse name: Not on file    Number of children: Not on file    Years of  "education: Not on file    Highest education level: Not on file   Occupational History    Not on file   Tobacco Use    Smoking status: Former     Current packs/day: 0.00     Average packs/day: 0.7 packs/day for 22.7 years (15.0 ttl pk-yrs)     Types: Cigarettes     Start date: 2008     Quit date: 2023     Years since quittin.3    Smokeless tobacco: Never   Vaping Use    Vaping status: Former    Start date: 2018    Quit date: 2023    Substances: Nicotine, THC   Substance and Sexual Activity    Alcohol use: Not Currently     Comment: 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 Not currently using crack cocaine.    Sexual activity: Not Currently     Partners: Female     Birth control/protection: Other   Other Topics Concern    Not on file   Social History Narrative    ** Merged History Encounter **          Social Determinants of Health     Financial Resource Strain: Low Risk  (2024)    Overall Financial Resource Strain (CARDIA)     Difficulty of Paying Living Expenses: Not hard at all   Food Insecurity: No Food Insecurity (10/7/2024)    Hunger Vital Sign     Worried About Running Out of Food in the Last Year: Never true     Ran Out of Food in the Last Year: Never true   Transportation Needs: No Transportation Needs (10/7/2024)    PRAPARE - Transportation     Lack of Transportation (Medical): No     Lack of Transportation (Non-Medical): No   Physical Activity: Not on file   Stress: Not on file   Social Connections: Not on file   Intimate Partner Violence: Not At Risk (11/10/2023)    [facility]    Humiliation, Afraid, Rape, and Kick questionnaire     Fear of Current or Ex-Partner: No     Emotionally Abused: No     Physically Abused: No     Sexually Abused: No   Housing Stability: Low Risk  (10/7/2024)    Housing Stability Vital Sign     " Unable to Pay for Housing in the Last Year: No     Number of Times Moved in the Last Year: 1     Homeless in the Last Year: No       Physical Examination:   Temp:  [97.7 °F (36.5 °C)-98.6 °F (37 °C)] 98.2 °F (36.8 °C)  HR:  [59-82] 64  BP: ()/(51-91) 131/85  Resp:  [18] 18  SpO2:  [91 %-99 %] 96 %  O2 Device: Trach mask    General: Awake, ill-appearing, diaphoretic   HENT:  MMM  Respiratory: Vent, Unlabored breathing initially but period of resp distress that improved  Cardiovascular: Regular rate   Gastrointestinal: somewhat taut and firm initially but improved during encounter to soft, nontender, nondistended, normoactive; + umb hernia   Genitourinary: +puga  SkiN/MSK/Extremities:  Generalized muscle atrophy  Little to no tone throughout  Neurologic/Psych:   Pupils dilated and reactive, he is able to track in all directions for short periods and squeeze R>L hand to command and slightly move all limbs to command some of the time.  He has poor attention.  He is not able to consistently answer to yes-no questions with head nodding or mouthing words.    (Reviewed case at length with nursing and CCM resident)     Data:  Lab Results   Component Value Date    HGB 13.3 10/13/2024    HCT 41.8 10/13/2024    WBC 14.24 (H) 10/13/2024    K 3.2 (L) 10/13/2024    K 3.9 12/19/2023     10/13/2024     12/19/2023    GLUCOSE 184 (H) 05/31/2024    CREATININE 0.78 10/13/2024    CREATININE 0.68 12/19/2023    BUN 11 10/13/2024    BUN 7 12/19/2023         CT head wo contrast    Result Date: 10/9/2024  Impression: No acute intracranial abnormality. Workstation performed: [id number]     XR chest portable ICU    Result Date: 10/9/2024  Impression: Left basilar effusion and atelectasis redemonstrated. Milder right basilar effusion/subsegmental atelectasis, also similar. Workstation performed: [id number]     XR chest portable ICU    Result Date: 10/7/2024  Impression: Near complete opacification of the left hemithorax with  leftward mediastinal shift, mildly improved from 10/5/2024, suggesting baseline underlying atelectasis and pleural effusion. Note, left lung pneumonia cannot be excluded. No pneumothorax. Resident: SONAM KNOX I, the attending radiologist, have reviewed the images and agree with the final report above. Workstation performed: [id number]     Bronchoscopy    Result Date: 10/6/2024  Impression: All observed locations appeared normal, including the pharynx, larynx, vocal cords, trachea, main prashant, left lung and right lung. Excessive, thick and white secretions present in the lingula and LLL; performed therapeutic aspiration RECOMMENDATION: Follow up cultures Continue to monitor in ICU     XR chest portable    Result Date: 10/6/2024  Impression: Complete opacification of the left hemithorax with mediastinal shift to the left indicating at least in part atelectasis. Percutaneous gastrostomy tube over the left upper quadrant with contrast injected by ED clinician with contrast in the stomach, duodenum, and proximal jejunum indicating proper position with no extravasation of contrast. Workstation performed: [id number]       Pertinent labs and imaging reviewed.      Patient seen on date of service listed.    80 minutes or greater spent for this encounter which included a combination of face-to-face time with patient and non-face-to-face time which in part specifically includes management of resp failure, weakness, AMS, autonomic dysfunction, vent, trach.  Non face-to-face time included coordination of care with patient's family by phone and patient's co-managing AP and/or physician(s) thru communication and/or review of their recent documentation as well as reviewing vitals, bowel/bladder function, recent labs, diagnostic imaging, and notes from therapy, CM, and nursing.      Thank you for allowing the PM&R service to participate in the care of this patient. We will continue to follow Hemanth Swanson's progress with  you. Please do not hesitate to call with questions or concerns.    Kilo Jennings MD, MS  [facility]  Physical Medicine and Rehabilitation  Brain Injury Medicine

## 2024-10-13 NOTE — PROGRESS NOTES
Critical Care Attending Note; John Sutherland   Note Date: 10/13/24  Note Time: 11:52 AM    Patient: Hemanth Swanson  Age, : 39 y.o., 1985 MRN: [id number] Code Status: Level 1 - Full Code Patient Location: ICU 05/ICU 05   Hospital LOS:8 days  ]   Patient seen and examined, medical record reviewed, discussed with house staff and nursing staff.     HPI   CC: Hypernatremia  39M with a PMH Seminoma(Orcetomy/Chemo), HTN, HFpEF, DM, Cardiac Arrest(Hypoxic - ), Demyelinating Neuromuscular Disorder(Trach/Peg),   Who presents after removing peg tube found to have aspiration and hypernatremia.     Key Recent Events   10/5: Admitted  10/6: Bronch  10/8: Atropine Given X1 - Sinus Bradycardia, normotensive  10/9: Seizure like activity, Atropine   Main ICU Plans:       #Neuro  Demyelinating Neuromuscular disorder - Baseline declining over time - Continued episodes of apnea, bearing down. Unclear etiology? We have optimized the patient for a respiratory, bladder and bowel status but episodes continue.   - Neurology Consulted - Appreciate recs - EEG Negative  - PMR consult   - Ativan PRN   - Seroquel qHs    #Card  VT/Bradycardia - Recurrent Vagal? - Patient having episodes of bearing down and apnea.  - Cardiology Consult -Appreciate recs  - K>4 Mg> 2    HFpEF   - Lasix 40 IV     Hypotension - Component of vaso plegia vs autonomic insufficiency - Minimal improvement   - Levophed - MAP > 65mmHg  - Midodrine - Increase dose  - Hydrocortisone/Florinef      #Pulm  Acute Respiratory Failure - Secondary to mucous plug/Pneumonia - Trach - Stopping Chest PT concerns for triggering episodes  - LTVV VC - 500cc  - Wean FiO2 - Maintain Oxygen Sat >92%  - VAP Bundle  - HOB > 30o    - PEEP Titrate  - Trach care  - Xopenex    Pulmonary Embolism   - Eliquis     #Renal  Hypernatremia - Resolved  - Nephrology Consult - Appreciate recs  - FWF    #GI  Bowel Regimen - Mirilax/Enema    #ID   Pnemonia - Likely Aspiration - Growing proteus,  pseudomonas - Continued hypotension   - Zosyn       #Endo  DM  - ISS      #DVT/GI ppx  Apixaban    #Lines/Tubes/Drains:   Invasive Devices       Central Venous Catheter Line  Duration             Port A Cath 03/14/23 Right Subclavian 579 days              Peripheral Intravenous Line  Duration             Peripheral IV 10/11/24 Right Antecubital 1 day    Peripheral IV 10/12/24 Left;Proximal;Ventral (anterior) Forearm <1 day              Drain  Duration             Gastrostomy/Enterostomy Percutaneous Interventional Gastrostomy 18 Fr.  days    Urethral Catheter 1 day              Airway  Duration             Surgical Airway Distal;Cuffed;Other (Comment) 133 days                    #Nutrition:   Diet Enteral/Parenteral; Tube Feeding No Oral Diet; Jevity 1.5; Continuous; 70; 200; Water; Every 4 hours        #Code Status:   Level 1 - Full Code    #Dispo:   ICU        John Sutherland MD  Pulmonary, Critical Care    Critical care time, excluding procedures, teaching, family meetings, and excludes any prior time recorded by the AP/resident, 35 minutes. Upon my evaluation, this patient has a high probability of imminent or life-threatening deterioration due to above problems which required my direct attention, intervention, and personal management.   Impression/Active Problems:    HTN  HFpEF  DM   Neuromuscualr disorder   Tracheostomy   PEG   Hypernatremia   Aspiration   Vaso vagal    Ventricular Tachycardia   Bradycardia    Physical Exam:     Vital Signs:   Weight: 91.3 kg (201 lb 4.5 oz)  IBW: Ideal body weight: 86.8 kg (191 lb 5.7 oz)  Adjusted ideal body weight: 88.6 kg (195 lb 5.2 oz)  Temp:  [97.7 °F (36.5 °C)-98.6 °F (37 °C)] 98.2 °F (36.8 °C)  HR:  [59-82] 64  BP: ()/(51-91) 131/85  Resp:  [18] 18  SpO2:  [91 %-99 %] 96 %  O2 Device: Trach mask  Physical Exam: Unchanged  General: NAD  Neuro: Alert, intermittently following commands  Heart: RRR  Lungs: Diminished Bases  Abdomen: Soft ND  Extremities:  Min edema                Ventilator Settings:     Vent Mode: AC/VC    Results from last 7 days   Lab Units 10/11/24  0204 10/10/24  1106 10/10/24  0928   PO2 SYLVESTER mm Hg 40.7 33.6* 55.5*     Radiologic Images Reviewed:   MRI 6/16  No acute intracranial pathology.  Stable mild nonspecific white matter changes likely due to chronic microangiopathy.    CXR   White Out Left     CT head  No acute intracranial abnormality. s  Input / Output:     Intake/Output Summary (Last 24 hours) at 10/13/2024 1152  Last data filed at 10/13/2024 0618  Gross per 24 hour   Intake 3792.55 ml   Output 4600 ml   Net -807.45 ml            Infusions:  norepinephrine, 1-30 mcg/min, Last Rate: 3 mcg/min (10/13/24 0442)      Scheduled Medications:  Current Facility-Administered Medications   Medication Dose Route Frequency Provider Last Rate    acetaminophen  650 mg Per PEG Tube Q6H PRN Ne Low MD      apixaban  5 mg Per PEG Tube BID Mohsen Moon MD      bisacodyl  10 mg Rectal Daily PRN Ne Low MD      chlorhexidine  15 mL Mouth/Throat Q12H [facility] Mohsne Moon MD      fludrocortisone  0.05 mg Per PEG Tube Daily Ne Low MD      hydrocortisone sodium succinate  50 mg Intravenous Q6H ISAEL Ne Low MD      levalbuterol  1.25 mg Nebulization Q6H Silvio Alas MD      LORazepam  2 mg Intravenous Q4H PRN Lesvia Wood PA-C      midodrine  10 mg Oral Q8H ISAEL Ne Low MD      norepinephrine  1-30 mcg/min Intravenous Titrated Silvio Alas MD 3 mcg/min (10/13/24 0442)    piperacillin-tazobactam  4.5 g Intravenous Q8H Ne Low MD 4.5 g (10/13/24 0900)    polyethylene glycol  17 g Per PEG Tube Daily PRN Ne Low MD      QUEtiapine  25 mg Oral HS Dominik Raman MD      senna-docusate sodium  1 tablet Per PEG Tube BID Ne Low MD         PRN Medications:    acetaminophen    [COMPLETED]  "bisacodyl **FOLLOWED BY** bisacodyl    LORazepam    polyethylene glycol    Labs Reviewed:  Results from last 7 days   Lab Units 10/13/24  0606 10/12/24  0517 10/11/24  0505   WBC Thousand/uL 14.24* 14.60* 11.51*   HEMOGLOBIN g/dL 13.3 12.5 12.1   HEMATOCRIT % 41.8 40.0 39.1   PLATELETS Thousands/uL 216 220 169      Results from last 7 days   Lab Units 10/13/24  0606 10/12/24  0517 10/11/24  0505   SODIUM mmol/L 144 141 144   CO2 mmol/L 35* 29 30   BUN mg/dL 11 6 7   CALCIUM mg/dL 8.1* 8.2* 7.9*   MAGNESIUM mg/dL 2.1 2.2 2.3   PHOSPHORUS mg/dL 2.1* 1.8* 3.2         Invalid input(s): \"ASTSGOT\", \"ALTSGPT\"LABRCNTIP@ ,alkphos:3,tbilirubin:3,dbilirubin:3)@  Results from last 7 days   Lab Units 10/08/24  0344   INR  1.46*           Invalid input(s): \"TROPT\", \"PBNP\"             I have personally seen and examined the patient on (10/13/24 between 2332-2407). I discussed the patient with the AP/resident including, but not limited to, verifying findings; reviewing labs and x-rays; discussing with consultants; developing the plan of care with the bedside nurse; and discussing treatment plan with patient or surrogate.  I have reviewed the note and assessment performed by the AP/resident and agree with the AP/resident’s documented findings and plan of care with the above additions/exceptions. Please see my comments for details and adjustments.                 "

## 2024-10-14 ENCOUNTER — APPOINTMENT (INPATIENT)
Dept: RADIOLOGY | Facility: HOSPITAL | Age: 39
DRG: 720 | End: 2024-10-14
Payer: COMMERCIAL

## 2024-10-14 PROBLEM — E87.0 HYPERNATREMIA: Status: RESOLVED | Noted: 2024-10-06 | Resolved: 2024-10-14

## 2024-10-14 LAB
ANION GAP SERPL CALCULATED.3IONS-SCNC: 5 MMOL/L (ref 4–13)
ANION GAP SERPL CALCULATED.3IONS-SCNC: 8 MMOL/L (ref 4–13)
ANION GAP SERPL CALCULATED.3IONS-SCNC: 8 MMOL/L (ref 4–13)
BUN SERPL-MCNC: 14 MG/DL (ref 5–25)
BUN SERPL-MCNC: 15 MG/DL (ref 5–25)
BUN SERPL-MCNC: 15 MG/DL (ref 5–25)
CA-I BLD-SCNC: 1.07 MMOL/L (ref 1.12–1.32)
CALCIUM SERPL-MCNC: 7.9 MG/DL (ref 8.4–10.2)
CALCIUM SERPL-MCNC: 8 MG/DL (ref 8.4–10.2)
CALCIUM SERPL-MCNC: 8.2 MG/DL (ref 8.4–10.2)
CHLORIDE SERPL-SCNC: 100 MMOL/L (ref 96–108)
CHLORIDE SERPL-SCNC: 106 MMOL/L (ref 96–108)
CHLORIDE SERPL-SCNC: 107 MMOL/L (ref 96–108)
CO2 SERPL-SCNC: 33 MMOL/L (ref 21–32)
CO2 SERPL-SCNC: 34 MMOL/L (ref 21–32)
CO2 SERPL-SCNC: 36 MMOL/L (ref 21–32)
CREAT SERPL-MCNC: 0.64 MG/DL (ref 0.6–1.3)
CREAT SERPL-MCNC: 0.69 MG/DL (ref 0.6–1.3)
CREAT SERPL-MCNC: 0.71 MG/DL (ref 0.6–1.3)
ERYTHROCYTE [DISTWIDTH] IN BLOOD BY AUTOMATED COUNT: 19.3 % (ref 11.6–15.1)
GFR SERPL CREATININE-BSD FRML MDRD: 119 ML/MIN/1.73SQ M
GFR SERPL CREATININE-BSD FRML MDRD: 121 ML/MIN/1.73SQ M
GFR SERPL CREATININE-BSD FRML MDRD: 125 ML/MIN/1.73SQ M
GLUCOSE SERPL-MCNC: 102 MG/DL (ref 65–140)
GLUCOSE SERPL-MCNC: 104 MG/DL (ref 65–140)
GLUCOSE SERPL-MCNC: 118 MG/DL (ref 65–140)
HCT VFR BLD AUTO: 40.8 % (ref 36.5–49.3)
HGB BLD-MCNC: 13 G/DL (ref 12–17)
MAGNESIUM SERPL-MCNC: 2.2 MG/DL (ref 1.9–2.7)
MCH RBC QN AUTO: 30.2 PG (ref 26.8–34.3)
MCHC RBC AUTO-ENTMCNC: 31.9 G/DL (ref 31.4–37.4)
MCV RBC AUTO: 95 FL (ref 82–98)
PHOSPHATE SERPL-MCNC: 2.2 MG/DL (ref 2.7–4.5)
PHOSPHATE SERPL-MCNC: 2.6 MG/DL (ref 2.7–4.5)
PLATELET # BLD AUTO: 194 THOUSANDS/UL (ref 149–390)
PMV BLD AUTO: 10.7 FL (ref 8.9–12.7)
POTASSIUM SERPL-SCNC: 2.8 MMOL/L (ref 3.5–5.3)
POTASSIUM SERPL-SCNC: 2.9 MMOL/L (ref 3.5–5.3)
POTASSIUM SERPL-SCNC: 3.6 MMOL/L (ref 3.5–5.3)
RBC # BLD AUTO: 4.3 MILLION/UL (ref 3.88–5.62)
SODIUM SERPL-SCNC: 144 MMOL/L (ref 135–147)
SODIUM SERPL-SCNC: 146 MMOL/L (ref 135–147)
SODIUM SERPL-SCNC: 147 MMOL/L (ref 135–147)
WBC # BLD AUTO: 9.32 THOUSAND/UL (ref 4.31–10.16)

## 2024-10-14 PROCEDURE — 94760 N-INVAS EAR/PLS OXIMETRY 1: CPT

## 2024-10-14 PROCEDURE — 94150 VITAL CAPACITY TEST: CPT

## 2024-10-14 PROCEDURE — 80048 BASIC METABOLIC PNL TOTAL CA: CPT

## 2024-10-14 PROCEDURE — 94640 AIRWAY INHALATION TREATMENT: CPT

## 2024-10-14 PROCEDURE — 85027 COMPLETE CBC AUTOMATED: CPT

## 2024-10-14 PROCEDURE — 84100 ASSAY OF PHOSPHORUS: CPT

## 2024-10-14 PROCEDURE — 99255 IP/OBS CONSLTJ NEW/EST HI 80: CPT

## 2024-10-14 PROCEDURE — 83735 ASSAY OF MAGNESIUM: CPT

## 2024-10-14 PROCEDURE — 99233 SBSQ HOSP IP/OBS HIGH 50: CPT

## 2024-10-14 PROCEDURE — 71045 X-RAY EXAM CHEST 1 VIEW: CPT

## 2024-10-14 PROCEDURE — 82330 ASSAY OF CALCIUM: CPT

## 2024-10-14 PROCEDURE — 94003 VENT MGMT INPAT SUBQ DAY: CPT

## 2024-10-14 PROCEDURE — 99232 SBSQ HOSP IP/OBS MODERATE 35: CPT | Performed by: INTERNAL MEDICINE

## 2024-10-14 RX ORDER — OXYCODONE HYDROCHLORIDE 5 MG/1
5 TABLET ORAL EVERY 6 HOURS
Status: DISCONTINUED | OUTPATIENT
Start: 2024-10-14 | End: 2024-10-16

## 2024-10-14 RX ORDER — POTASSIUM CHLORIDE 29.8 MG/ML
40 INJECTION INTRAVENOUS ONCE
Status: DISCONTINUED | OUTPATIENT
Start: 2024-10-14 | End: 2024-10-14

## 2024-10-14 RX ORDER — SODIUM CHLORIDE FOR INHALATION 3 %
4 VIAL, NEBULIZER (ML) INHALATION
Status: DISCONTINUED | OUTPATIENT
Start: 2024-10-14 | End: 2024-10-24

## 2024-10-14 RX ORDER — POTASSIUM CHLORIDE 29.8 MG/ML
40 INJECTION INTRAVENOUS ONCE
Status: COMPLETED | OUTPATIENT
Start: 2024-10-14 | End: 2024-10-14

## 2024-10-14 RX ORDER — POTASSIUM CHLORIDE 20MEQ/15ML
40 LIQUID (ML) ORAL ONCE
Status: DISCONTINUED | OUTPATIENT
Start: 2024-10-14 | End: 2024-10-15 | Stop reason: ALTCHOICE

## 2024-10-14 RX ORDER — SODIUM CHLORIDE FOR INHALATION 3 %
4 VIAL, NEBULIZER (ML) INHALATION
Status: DISCONTINUED | OUTPATIENT
Start: 2024-10-14 | End: 2024-10-14

## 2024-10-14 RX ORDER — POTASSIUM CHLORIDE 14.9 MG/ML
20 INJECTION INTRAVENOUS ONCE
Status: COMPLETED | OUTPATIENT
Start: 2024-10-14 | End: 2024-10-14

## 2024-10-14 RX ORDER — POTASSIUM CHLORIDE 20MEQ/15ML
40 LIQUID (ML) ORAL ONCE
Status: COMPLETED | OUTPATIENT
Start: 2024-10-14 | End: 2024-10-14

## 2024-10-14 RX ORDER — POTASSIUM CHLORIDE 14.9 MG/ML
20 INJECTION INTRAVENOUS ONCE
Status: DISCONTINUED | OUTPATIENT
Start: 2024-10-14 | End: 2024-10-15

## 2024-10-14 RX ORDER — POTASSIUM CHLORIDE 14.9 MG/ML
20 INJECTION INTRAVENOUS
Status: DISPENSED | OUTPATIENT
Start: 2024-10-14 | End: 2024-10-14

## 2024-10-14 RX ORDER — MAGNESIUM SULFATE HEPTAHYDRATE 40 MG/ML
2 INJECTION, SOLUTION INTRAVENOUS ONCE
Status: COMPLETED | OUTPATIENT
Start: 2024-10-14 | End: 2024-10-14

## 2024-10-14 RX ADMIN — POTASSIUM & SODIUM PHOSPHATES POWDER PACK 280-160-250 MG 2 PACKET: 280-160-250 PACK at 18:31

## 2024-10-14 RX ADMIN — FLUDROCORTISONE ACETATE 0.05 MG: 0.1 TABLET ORAL at 08:22

## 2024-10-14 RX ADMIN — PIPERACILLIN SODIUM AND TAZOBACTAM SODIUM 4.5 G: 36; 4.5 INJECTION, POWDER, LYOPHILIZED, FOR SOLUTION INTRAVENOUS at 00:22

## 2024-10-14 RX ADMIN — POTASSIUM CHLORIDE 40 MEQ: 1.5 SOLUTION ORAL at 08:21

## 2024-10-14 RX ADMIN — APIXABAN 5 MG: 5 TABLET, FILM COATED ORAL at 17:36

## 2024-10-14 RX ADMIN — LEVALBUTEROL HYDROCHLORIDE 1.25 MG: 1.25 SOLUTION RESPIRATORY (INHALATION) at 13:34

## 2024-10-14 RX ADMIN — LEVALBUTEROL HYDROCHLORIDE 1.25 MG: 1.25 SOLUTION RESPIRATORY (INHALATION) at 19:03

## 2024-10-14 RX ADMIN — LORAZEPAM 2 MG: 2 INJECTION INTRAMUSCULAR; INTRAVENOUS at 13:45

## 2024-10-14 RX ADMIN — MAGNESIUM SULFATE HEPTAHYDRATE 2 G: 40 INJECTION, SOLUTION INTRAVENOUS at 18:32

## 2024-10-14 RX ADMIN — PIPERACILLIN SODIUM AND TAZOBACTAM SODIUM 4.5 G: 36; 4.5 INJECTION, POWDER, LYOPHILIZED, FOR SOLUTION INTRAVENOUS at 08:21

## 2024-10-14 RX ADMIN — LEVALBUTEROL HYDROCHLORIDE 1.25 MG: 1.25 SOLUTION RESPIRATORY (INHALATION) at 07:31

## 2024-10-14 RX ADMIN — LEVALBUTEROL HYDROCHLORIDE 1.25 MG: 1.25 SOLUTION RESPIRATORY (INHALATION) at 01:00

## 2024-10-14 RX ADMIN — Medication 4 ML: at 13:34

## 2024-10-14 RX ADMIN — CHLORHEXIDINE GLUCONATE 15 ML: 1.2 RINSE ORAL at 22:34

## 2024-10-14 RX ADMIN — QUETIAPINE FUMARATE 25 MG: 25 TABLET ORAL at 22:34

## 2024-10-14 RX ADMIN — POTASSIUM PHOSPHATE, MONOBASIC AND POTASSIUM PHOSPHATE, DIBASIC 21 MMOL: 224; 236 INJECTION, SOLUTION, CONCENTRATE INTRAVENOUS at 09:59

## 2024-10-14 RX ADMIN — OXYCODONE HYDROCHLORIDE 5 MG: 5 TABLET ORAL at 22:34

## 2024-10-14 RX ADMIN — CHLORHEXIDINE GLUCONATE 15 ML: 1.2 RINSE ORAL at 08:21

## 2024-10-14 RX ADMIN — MIDODRINE HYDROCHLORIDE 15 MG: 5 TABLET ORAL at 22:34

## 2024-10-14 RX ADMIN — APIXABAN 5 MG: 5 TABLET, FILM COATED ORAL at 08:21

## 2024-10-14 RX ADMIN — OXYCODONE HYDROCHLORIDE 5 MG: 5 TABLET ORAL at 15:41

## 2024-10-14 RX ADMIN — Medication 4 ML: at 19:03

## 2024-10-14 RX ADMIN — POTASSIUM CHLORIDE 20 MEQ: 200 INJECTION, SOLUTION INTRAVENOUS at 18:12

## 2024-10-14 RX ADMIN — LORAZEPAM 2 MG: 2 INJECTION INTRAMUSCULAR; INTRAVENOUS at 07:24

## 2024-10-14 RX ADMIN — MIDODRINE HYDROCHLORIDE 15 MG: 5 TABLET ORAL at 06:23

## 2024-10-14 RX ADMIN — MIDODRINE HYDROCHLORIDE 15 MG: 5 TABLET ORAL at 14:05

## 2024-10-14 RX ADMIN — PIPERACILLIN SODIUM AND TAZOBACTAM SODIUM 4.5 G: 36; 4.5 INJECTION, POWDER, LYOPHILIZED, FOR SOLUTION INTRAVENOUS at 15:41

## 2024-10-14 RX ADMIN — HYDROCORTISONE SODIUM SUCCINATE 50 MG: 100 INJECTION, POWDER, FOR SOLUTION INTRAMUSCULAR; INTRAVENOUS at 06:23

## 2024-10-14 RX ADMIN — HYDROCORTISONE SODIUM SUCCINATE 50 MG: 100 INJECTION, POWDER, FOR SOLUTION INTRAMUSCULAR; INTRAVENOUS at 00:22

## 2024-10-14 RX ADMIN — POTASSIUM CHLORIDE 40 MEQ: 1.5 SOLUTION ORAL at 18:12

## 2024-10-14 RX ADMIN — POTASSIUM CHLORIDE 40 MEQ: 29.8 INJECTION, SOLUTION INTRAVENOUS at 08:21

## 2024-10-14 RX ADMIN — POTASSIUM CHLORIDE 20 MEQ: 200 INJECTION, SOLUTION INTRAVENOUS at 19:22

## 2024-10-14 RX ADMIN — OXYCODONE HYDROCHLORIDE 5 MG: 5 TABLET ORAL at 09:57

## 2024-10-14 NOTE — RESPIRATORY THERAPY NOTE
10/14/24 0731   Respiratory Assessment   Assessment Type During-treatment   General Appearance Sedated   Respiratory Pattern Assisted   Chest Assessment Chest expansion symmetrical   Bilateral Breath Sounds Diminished   Cough None   Suction Oral;Trach   Resp Comments Lavaged pt and sx. Still has yellowish Green Discharge from mouth and nose. AP Khalif and  RN aware. Attempted SBT and pt went apneic . Placed back on Full settings AP Khalif aware. Trach care done and Inner Cannula changed   Vent Information   $ Pulse Oximetry Spot Check Charge Completed   SpO2 99 %   Surgical Airway Distal;Cuffed;Other (Comment)   Placement Date/Time: 06/01/24 1155   Tube Size: 6  Placed By: Physician  Placed by (Name): Dr. Ronquillo  Type: Tracheostomy  Style: (c) Distal;Cuffed;Other (Comment)   Status Cuff Inflated   Site Assessment Clean;Dry   Site Care Cleansed;Dressing applied;Dried   Inner Cannula Care Changed/new   Ties Assessment Clean;Dry   Surgical Airway Cuff Pressure (color) Green   Equipment at bedside BVM;Wall Suction setup;Additional complete same size trach tube;Additional complete one size smaller trach tube;Sterile saline;Additional inner cannula

## 2024-10-14 NOTE — ASSESSMENT & PLAN NOTE
Likely vagal response to coughing.  Spontaneously resolves without intervention.  Was initially started on dopamine with heart rate goal of 60.  Possibly related to autonomic insufficiency  Resolves with ativan.    Plan:  Increase midodrine to 15 mg every 8  Continue hydrocortisone 50 mg every 6  Continue  fludrocortisone 0.05 mg daily.  Will continue to monitor.

## 2024-10-14 NOTE — PLAN OF CARE
Problem: Prexisting or High Potential for Compromised Skin Integrity  Goal: Skin integrity is maintained or improved  Description: INTERVENTIONS:  - Identify patients at risk for skin breakdown  - Assess and monitor skin integrity  - Assess and monitor nutrition and hydration status  - Monitor labs   - Assess for incontinence   - Turn and reposition patient  - Assist with mobility/ambulation  - Relieve pressure over bony prominences  - Avoid friction and shearing  - Provide appropriate hygiene as needed including keeping skin clean and dry  - Evaluate need for skin moisturizer/barrier cream  - Collaborate with interdisciplinary team   - Patient/family teaching  - Consider wound care consult   Outcome: Progressing     Problem: PAIN - ADULT  Goal: Verbalizes/displays adequate comfort level or baseline comfort level  Description: Interventions:  - Encourage patient to monitor pain and request assistance  - Assess pain using appropriate pain scale  - Administer analgesics based on type and severity of pain and evaluate response  - Implement non-pharmacological measures as appropriate and evaluate response  - Consider cultural and social influences on pain and pain management  - Notify physician/advanced practitioner if interventions unsuccessful or patient reports new pain  Outcome: Progressing     Problem: INFECTION - ADULT  Goal: Absence or prevention of progression during hospitalization  Description: INTERVENTIONS:  - Assess and monitor for signs and symptoms of infection  - Monitor lab/diagnostic results  - Monitor all insertion sites, i.e. indwelling lines, tubes, and drains  - Monitor endotracheal if appropriate and nasal secretions for changes in amount and color  - Hi Hat appropriate cooling/warming therapies per order  - Administer medications as ordered  - Instruct and encourage patient and family to use good hand hygiene technique  - Identify and instruct in appropriate isolation precautions for  identified infection/condition  Outcome: Progressing  Goal: Absence of fever/infection during neutropenic period  Description: INTERVENTIONS:  - Monitor WBC    Outcome: Progressing     Problem: SAFETY ADULT  Goal: Patient will remain free of falls  Description: INTERVENTIONS:  - Educate patient/family on patient safety including physical limitations  - Instruct patient to call for assistance with activity   - Consult OT/PT to assist with strengthening/mobility   - Keep Call bell within reach  - Keep bed low and locked with side rails adjusted as appropriate  - Keep care items and personal belongings within reach  - Initiate and maintain comfort rounds  - Make Fall Risk Sign visible to staff  - Offer Toileting every 2 Hours, in advance of need  - Initiate/Maintain bed alarm  - Obtain necessary fall risk management equipment:   - Apply yellow socks and bracelet for high fall risk patients  - Consider moving patient to room near nurses station  Outcome: Progressing  Goal: Maintain or return to baseline ADL function  Description: INTERVENTIONS:  -  Assess patient's ability to carry out ADLs; assess patient's baseline for ADL function and identify physical deficits which impact ability to perform ADLs (bathing, care of mouth/teeth, toileting, grooming, dressing, etc.)  - Assess/evaluate cause of self-care deficits   - Assess range of motion  - Assess patient's mobility; develop plan if impaired  - Assess patient's need for assistive devices and provide as appropriate  - Encourage maximum independence but intervene and supervise when necessary  - Involve family in performance of ADLs  - Assess for home care needs following discharge   - Consider OT consult to assist with ADL evaluation and planning for discharge  - Provide patient education as appropriate  Outcome: Progressing  Goal: Maintains/Returns to pre admission functional level  Description: INTERVENTIONS:  - Perform AM-PAC 6 Click Basic Mobility/ Daily Activity  assessment daily.  - Set and communicate daily mobility goal to care team and patient/family/caregiver.   - Collaborate with rehabilitation services on mobility goals if consulted  - Perform Range of Motion 3 times a day.  - Reposition patient every 2 hours.  - Dangle patient 2 times a day  - Stand patient 2 times a day  - Ambulate patient 2 times a day  - Out of bed to chair 2 times a day   - Out of bed for meals 2 times a day  - Out of bed for toileting  - Record patient progress and toleration of activity level   Outcome: Progressing     Problem: DISCHARGE PLANNING  Goal: Discharge to home or other facility with appropriate resources  Description: INTERVENTIONS:  - Identify barriers to discharge w/patient and caregiver  - Arrange for needed discharge resources and transportation as appropriate  - Identify discharge learning needs (meds, wound care, etc.)  - Arrange for interpretive services to assist at discharge as needed  - Refer to Case Management Department for coordinating discharge planning if the patient needs post-hospital services based on physician/advanced practitioner order or complex needs related to functional status, cognitive ability, or social support system  Outcome: Progressing     Problem: Knowledge Deficit  Goal: Patient/family/caregiver demonstrates understanding of disease process, treatment plan, medications, and discharge instructions  Description: Complete learning assessment and assess knowledge base.  Interventions:  - Provide teaching at level of understanding  - Provide teaching via preferred learning methods  Outcome: Progressing     Problem: SAFETY,RESTRAINT: NV/NON-SELF DESTRUCTIVE BEHAVIOR  Goal: Remains free of harm/injury (restraint for non violent/non self-detsructive behavior)  Description: INTERVENTIONS:  - Instruct patient/family regarding restraint use   - Assess and monitor physiologic and psychological status   - Provide interventions and comfort measures to meet  assessed patient needs   - Identify and implement measures to help patient regain control  - Assess readiness for release of restraint   Outcome: Progressing  Goal: Returns to optimal restraint-free functioning  Description: INTERVENTIONS:  - Assess the patient's behavior and symptoms that indicate continued need for restraint  - Identify and implement measures to help patient regain control  - Assess readiness for release of restraint   Outcome: Progressing     Problem: Nutrition/Hydration-ADULT  Goal: Nutrient/Hydration intake appropriate for improving, restoring or maintaining nutritional needs  Description: Monitor and assess patient's nutrition/hydration status for malnutrition. Collaborate with interdisciplinary team and initiate plan and interventions as ordered.  Monitor patient's weight and dietary intake as ordered or per policy. Utilize nutrition screening tool and intervene as necessary. Determine patient's food preferences and provide high-protein, high-caloric foods as appropriate.     INTERVENTIONS:  - Monitor oral intake, urinary output, labs, and treatment plans  - Assess nutrition and hydration status and recommend course of action  - Evaluate amount of meals eaten  - Assist patient with eating if necessary   - Allow adequate time for meals  - Recommend/ encourage appropriate diets, oral nutritional supplements, and vitamin/mineral supplements  - Order, calculate, and assess calorie counts as needed  - Recommend, monitor, and adjust tube feedings and TPN/PPN based on assessed needs  - Assess need for intravenous fluids  - Provide specific nutrition/hydration education as appropriate  - Include patient/family/caregiver in decisions related to nutrition  Outcome: Progressing

## 2024-10-14 NOTE — ASSESSMENT & PLAN NOTE
Resolved  Now in the mid 140s  +17 L since admission.    Plan:  Continue 200 cc flushes and 100 cc/hr maintenance.  Labs daily now.

## 2024-10-14 NOTE — ASSESSMENT & PLAN NOTE
Patient already has refills.   Pertinent palliative diagnoses include: Testicular cancer s.p left orchiectomy/chemo, Chronic respiratory failure s/p Trach/vent dependent/PEG tube placement, neuromuscular syndrome, hx of prior PE on Eliquis, Toxic metabolic encephalopathy, hx of cardiac arrest, anxiety, depression    Social support  Patient is supported by mother, sister  Supportive listening provided  Normalized experience of patient/family  Provided anxiety containment  Provided anticipatory guidance    Follow up  Palliative Care will continue to follow. [facility] will return Monday, 10/14/23.  Please reach out to on-call provider via Epic Secure Chat  if questions or concerns arise.  Please do not hesitate to reach our on call provider through our clinic answering service at [phone] should you have acute symptom control concerns.

## 2024-10-14 NOTE — PLAN OF CARE
Problem: Prexisting or High Potential for Compromised Skin Integrity  Goal: Skin integrity is maintained or improved  Description: INTERVENTIONS:  - Identify patients at risk for skin breakdown  - Assess and monitor skin integrity  - Assess and monitor nutrition and hydration status  - Monitor labs   - Assess for incontinence   - Turn and reposition patient  - Assist with mobility/ambulation  - Relieve pressure over bony prominences  - Avoid friction and shearing  - Provide appropriate hygiene as needed including keeping skin clean and dry  - Evaluate need for skin moisturizer/barrier cream  - Collaborate with interdisciplinary team   - Patient/family teaching  - Consider wound care consult   Outcome: Progressing     Problem: PAIN - ADULT  Goal: Verbalizes/displays adequate comfort level or baseline comfort level  Description: Interventions:  - Encourage patient to monitor pain and request assistance  - Assess pain using appropriate pain scale  - Administer analgesics based on type and severity of pain and evaluate response  - Implement non-pharmacological measures as appropriate and evaluate response  - Consider cultural and social influences on pain and pain management  - Notify physician/advanced practitioner if interventions unsuccessful or patient reports new pain  Outcome: Progressing     Problem: INFECTION - ADULT  Goal: Absence or prevention of progression during hospitalization  Description: INTERVENTIONS:  - Assess and monitor for signs and symptoms of infection  - Monitor lab/diagnostic results  - Monitor all insertion sites, i.e. indwelling lines, tubes, and drains  - Monitor endotracheal if appropriate and nasal secretions for changes in amount and color  - [place] appropriate cooling/warming therapies per order  - Administer medications as ordered  - Instruct and encourage patient and family to use good hand hygiene technique  - Identify and instruct in appropriate isolation precautions for  identified infection/condition  Outcome: Progressing  Goal: Absence of fever/infection during neutropenic period  Description: INTERVENTIONS:  - Monitor WBC    Outcome: Progressing     Problem: SAFETY ADULT  Goal: Patient will remain free of falls  Description: INTERVENTIONS:  - Educate patient/family on patient safety including physical limitations  - Instruct patient to call for assistance with activity   - Consult OT/PT to assist with strengthening/mobility   - Keep Call bell within reach  - Keep bed low and locked with side rails adjusted as appropriate  - Keep care items and personal belongings within reach  - Initiate and maintain comfort rounds  - Make Fall Risk Sign visible to staff  - Offer Toileting every  Hours, in advance of need  - Initiate/Maintain alarm  - Obtain necessary fall risk management equipment:   - Apply yellow socks and bracelet for high fall risk patients  - Consider moving patient to room near nurses station  Outcome: Progressing  Goal: Maintain or return to baseline ADL function  Description: INTERVENTIONS:  -  Assess patient's ability to carry out ADLs; assess patient's baseline for ADL function and identify physical deficits which impact ability to perform ADLs (bathing, care of mouth/teeth, toileting, grooming, dressing, etc.)  - Assess/evaluate cause of self-care deficits   - Assess range of motion  - Assess patient's mobility; develop plan if impaired  - Assess patient's need for assistive devices and provide as appropriate  - Encourage maximum independence but intervene and supervise when necessary  - Involve family in performance of ADLs  - Assess for home care needs following discharge   - Consider OT consult to assist with ADL evaluation and planning for discharge  - Provide patient education as appropriate  Outcome: Progressing  Goal: Maintains/Returns to pre admission functional level  Description: INTERVENTIONS:  - Perform AM-PAC 6 Click Basic Mobility/ Daily Activity  assessment daily.  - Set and communicate daily mobility goal to care team and patient/family/caregiver.   - Collaborate with rehabilitation services on mobility goals if consulted  - Perform Range of Motion  times a day.  - Reposition patient every  hours.  - Dangle patient  times a day  - Stand patient  times a day  - Ambulate patient  times a day  - Out of bed to chair  times a day   - Out of bed for meal times a day  - Out of bed for toileting  - Record patient progress and toleration of activity level   Outcome: Progressing     Problem: DISCHARGE PLANNING  Goal: Discharge to home or other facility with appropriate resources  Description: INTERVENTIONS:  - Identify barriers to discharge w/patient and caregiver  - Arrange for needed discharge resources and transportation as appropriate  - Identify discharge learning needs (meds, wound care, etc.)  - Arrange for interpretive services to assist at discharge as needed  - Refer to Case Management Department for coordinating discharge planning if the patient needs post-hospital services based on physician/advanced practitioner order or complex needs related to functional status, cognitive ability, or social support system  Outcome: Progressing     Problem: Knowledge Deficit  Goal: Patient/family/caregiver demonstrates understanding of disease process, treatment plan, medications, and discharge instructions  Description: Complete learning assessment and assess knowledge base.  Interventions:  - Provide teaching at level of understanding  - Provide teaching via preferred learning methods  Outcome: Progressing     Problem: SAFETY,RESTRAINT: NV/NON-SELF DESTRUCTIVE BEHAVIOR  Goal: Remains free of harm/injury (restraint for non violent/non self-detsructive behavior)  Description: INTERVENTIONS:  - Instruct patient/family regarding restraint use   - Assess and monitor physiologic and psychological status   - Provide interventions and comfort measures to meet assessed patient  needs   - Identify and implement measures to help patient regain control  - Assess readiness for release of restraint   Outcome: Progressing  Goal: Returns to optimal restraint-free functioning  Description: INTERVENTIONS:  - Assess the patient's behavior and symptoms that indicate continued need for restraint  - Identify and implement measures to help patient regain control  - Assess readiness for release of restraint   Outcome: Progressing     Problem: Nutrition/Hydration-ADULT  Goal: Nutrient/Hydration intake appropriate for improving, restoring or maintaining nutritional needs  Description: Monitor and assess patient's nutrition/hydration status for malnutrition. Collaborate with interdisciplinary team and initiate plan and interventions as ordered.  Monitor patient's weight and dietary intake as ordered or per policy. Utilize nutrition screening tool and intervene as necessary. Determine patient's food preferences and provide high-protein, high-caloric foods as appropriate.     INTERVENTIONS:  - Monitor oral intake, urinary output, labs, and treatment plans  - Assess nutrition and hydration status and recommend course of action  - Evaluate amount of meals eaten  - Assist patient with eating if necessary   - Allow adequate time for meals  - Recommend/ encourage appropriate diets, oral nutritional supplements, and vitamin/mineral supplements  - Order, calculate, and assess calorie counts as needed  - Recommend, monitor, and adjust tube feedings and TPN/PPN based on assessed needs  - Assess need for intravenous fluids  - Provide specific nutrition/hydration education as appropriate  - Include patient/family/caregiver in decisions related to nutrition  Outcome: Progressing

## 2024-10-14 NOTE — PHYSICAL THERAPY NOTE
Physical Therapy Cancellation Note     10/14/24 1028   Note Type   Note type Cancelled Session   Cancel Reasons Medical status   Additional Comments PT consult received and chart reviewed. Per ICU mobility rounds pt is not appropriate for participation in PT on this date. Will hold and f/u as medically appropriate.       Namrata Blas, PT, DPT   Available via [email or web address]  NPI # [id number]  [place] License - [id number]  10/14/2024

## 2024-10-14 NOTE — PROGRESS NOTES
Progress Note - Palliative Care   Name: Hemanth Swanson 39 y.o. male I MRN: [id number]  Unit/Bed#: ICU 05 I Date of Admission: 10/5/2024   Date of Service: 10/14/2024 I Hospital Day: 9    Assessment & Plan  Seizure-like activity (HCC)  Noted to have seizure-like activity in ICU on 10/09  EEG negative for seizures though symptoms persist  Neurology team consulted  Mgmt per primary team/neurology.   Chronic respiratory failure with hypoxia and hypercapnia (HCC)  Unexplained respiratory failure s/p PEG/Trach/vent dependent with concern of underlying neuromuscular disease  Hx of PE on Eliquis  Requiring 24/7 ventilator support.   Mgmt per primary team  Sepsis (HCC)  Fever, tachycardia, leukocytosis present on arrival to ED  CXR revealed left sided opacification. Bronchoscopic culture revealed proteus and pseudomonas  On Zosyn  Mgmt per primary team  Goals of care, counseling/discussion  Decisional apparatus:  Patient is not competent on exam today.  If competency is lost, patient's substitute decision maker would default to mother by [place] Act 169.  Advance Directive / Living Will / POLST / POA Forms: Has POA documents at home. Sister Dmitry Swanson is POA. She was asked to bring a copy into the hospital so that it can be reviewed and entered into EMR.    Goals  Level 1 code status.   Full code.   Disease focused care.   No limits placed.   Spoke with sister Dmitry/CRISELDA who states patient wants all interventions to prolong survivability  Sister reports frequent discussions with patient regarding his QoL and goals for treatment; patient motivated by being around for his children and has hopes of recovery  He would still want life-prolonging cares even if there is no chance of recovery  His wish is to never be placed in a facility, short or long term and wants to remain at his sisters home. Sister, grandmother and best friend are all home healthcare nurses who provided 24/7 support to patient at home  Plan to return home upon  "discharge.   Sister requesting help setting up PT/OT at home.  Family would like all the home support they can get and are interested in following with palliative care. They prefer home palliative care. Will place on hospital follow up list.    Palliative care by specialist  Pertinent palliative diagnoses include: Testicular cancer s.p left orchiectomy/chemo, Chronic respiratory failure s/p Trach/vent dependent/PEG tube placement, neuromuscular syndrome, hx of prior PE on Eliquis, Toxic metabolic encephalopathy, hx of cardiac arrest, anxiety, depression    Social support  Patient is supported by mother, sister  Supportive listening provided  Normalized experience of patient/family  Provided anxiety containment  Provided anticipatory guidance    Follow up  Palliative Care will continue to follow. [facility] will return Monday, 10/14/23.  Please reach out to on-call provider via Epic Secure Chat  if questions or concerns arise.  Please do not hesitate to reach our on call provider through our clinic answering service at [phone] should you have acute symptom control concerns.        PDMP Review: I have reviewed the patient's controlled substance dispensing history in the Prescription Drug Monitoring Program in compliance with the [facility] regulations before prescribing any controlled substances.    24 Hour Chart History:  Chart Reviewed prior to visit    Patient sleeping in bed.   No family presents.   NAD. On vent.   Spoke with RN- they have added oxy to see if this can help him relax with the vent- patient had been \"fighting\" the vent and needed to be manually vented.     Left message for sister Dmitry to return call.      Objective :  Temp:  [97 °F (36.1 °C)-98.6 °F (37 °C)] 97.3 °F (36.3 °C)  HR:  [60-93] 68  BP: ()/() 135/90  SpO2:  [89 %-100 %] 95 %  O2 Device: Ventilator    Physical Exam  Vitals and nursing note reviewed.   Constitutional:       General: He is sleeping. He is not in acute distress.  HENT:    "   Head: Normocephalic and atraumatic.   Cardiovascular:      Rate and Rhythm: Normal rate.   Pulmonary:      Effort: Pulmonary effort is normal. No respiratory distress.      Comments: Vent dependent  Skin:     General: Skin is warm and dry.   Neurological:      Comments: Unable to assess   Psychiatric:      Comments: Unable to assess            Lab Results: I have reviewed the following results:  Lab Results   Component Value Date/Time    SODIUM 144 10/14/2024 04:47 AM    SODIUM 136 12/19/2023 12:04 PM    K 2.8 (L) 10/14/2024 04:47 AM    K 3.9 12/19/2023 12:04 PM    BUN 14 10/14/2024 04:47 AM    BUN 7 12/19/2023 12:04 PM    CREATININE 0.71 10/14/2024 04:47 AM    CREATININE 0.68 12/19/2023 12:04 PM    GLUC 104 10/14/2024 04:47 AM    GLUC 86 12/19/2023 12:04 PM    CALCIUM 8.2 (L) 10/14/2024 04:47 AM    CALCIUM 9.6 12/19/2023 12:04 PM    AST 12 (L) 10/08/2024 03:44 AM    AST 25 12/19/2023 12:04 PM    ALT 11 10/08/2024 03:44 AM    ALT 53 12/19/2023 12:04 PM    ALB 2.7 (L) 10/08/2024 03:44 AM    ALB 4.2 12/19/2023 12:04 PM    TP 5.2 (L) 10/08/2024 03:44 AM    TP 7.3 12/19/2023 12:04 PM    EGFR 119 10/14/2024 04:47 AM    EGFR 123 12/19/2023 12:04 PM     Lab Results   Component Value Date/Time    HGB 13.0 10/14/2024 04:47 AM    WBC 9.32 10/14/2024 04:47 AM     10/14/2024 04:47 AM    INR 1.46 (H) 10/08/2024 03:44 AM    PTT 31 10/08/2024 03:44 AM     Lab Results   Component Value Date/Time    XYJ7PGDUWBAQ 3.179 10/08/2024 07:46 AM         Administrative Statements   I have spent a total time of 20 minutes in caring for this patient on the day of the visit/encounter including Documenting in the medical record, Reviewing / ordering tests, medicine, procedures  , Obtaining or reviewing history  , and Communicating with other healthcare professionals . Topics discussed with the patient / family include psychosocial support, supportive listening, and anticipatory guidance.

## 2024-10-14 NOTE — RESPIRATORY THERAPY NOTE
10/14/24 0954   Respiratory Assessment   Resp Comments Pt had another spisode. PIP was 55 . Manually ventilated till episode passed. DR Sams present. Increased Peep to 10 and    Vent Information   Vent    Vent type     Vent Mode AC/VC   $ Pulse Oximetry Spot Check Charge Completed   SpO2 100 %   AC/VC Settings   Vt (mL) (S)  600 mL   PEEP (cmH2O) (S)  10 cmH2O

## 2024-10-14 NOTE — ASSESSMENT & PLAN NOTE
[place] ED: febrile, tachycardic, with leukocytosis and a lactate of 12.  X-ray with left sided opacification, increased secretions from tracheostomy.    Repeat lab work upon arrival show persistent leukocytosis and hypernatremia.  Urine strep and Legionella negative  Bronc culture revealed Proteus and Pseudomonas    Plan:  Continue Zosyn

## 2024-10-14 NOTE — ASSESSMENT & PLAN NOTE
Evaluated by surgery.  Found to not be a surgical candidate due to ongoing comorbidities.  Fat containing incarcerated hernia without bowel.

## 2024-10-14 NOTE — PROGRESS NOTES
Progress Note - Critical Care/ICU   Name: Hemanth Swanson 39 y.o. male I MRN: [id number]  Unit/Bed#: ICU 05 I Date of Admission: 10/5/2024   Date of Service: 10/14/2024 I Hospital Day: 9      Assessment & Plan  Bradycardia  Likely vagal response to coughing.  Spontaneously resolves without intervention.  Was initially started on dopamine with heart rate goal of 60.  Possibly related to autonomic insufficiency  Resolves with ativan.    Plan:  Increase midodrine to 15 mg every 8  Continue hydrocortisone 50 mg every 6  Continue  fludrocortisone 0.05 mg daily.  Will continue to monitor.  Hypernatremia  Resolved  Now in the mid 140s  +17 L since admission.    Plan:  Continue 200 cc flushes and 100 cc/hr maintenance.  Labs daily now.  Sepsis (HCC)  [place] ED: febrile, tachycardic, with leukocytosis and a lactate of 12.  X-ray with left sided opacification, increased secretions from tracheostomy.    Repeat lab work upon arrival show persistent leukocytosis and hypernatremia.  Urine strep and Legionella negative  [facility] culture revealed Proteus and Pseudomonas    Plan:  Continue Zosyn  Chronic respiratory failure with hypoxia and hypercapnia (HCC)  Neuromuscular disease post chemotherapyS/P tracheostomy in 9/2023.  Vent dependent 24/7: current vent settings 14/500/6/60  Last bronchoscopy 6/1 with cultures growing serratia and pseudomonas.  Chest X-ray 10/5: Left sided opacification with possible mucus plugging.  CXR 10/7: Near complete opacification of the left hemithorax with leftward mediastinal shift, mildly improved from 10/5/2024, suggesting baseline underlying atelectasis and pleural effusion.   Chest x-ray 10/9 seems improved from prior.    Plan:  Continue vent support  Xopnex nebs TID  Continue mucomyst  Seizure-like activity (HCC)  Repeat episode of seizure like activity this morning.  No evidence of seizure on EEG.  Episodes relieved by ativan  Consultation with PM&R for recommendations regarding rehab.  Toxic  metabolic encephalopathy  Continue supportive care.  Umbilical hernia without obstruction and without gangrene  Evaluated by surgery.  Found to not be a surgical candidate due to ongoing comorbidities.  Fat containing incarcerated hernia without bowel.  Palliative care by specialist  Palliative care is following.  They will continue to engage with family regarding goals of care.  Disposition: Critical care    ICU Core Measures     Vented Patient  VAP Bundle  VAP bundle ordered     A: Assess, Prevent, and Manage Pain Has pain been assessed? Yes  Need for changes to pain regimen? Yes   B: Both Spontaneous Awakening Trials (SATs) and Spontaneous Breathing Trials (SBTs) Plan to perform spontaneous awakening trial today? Yes   Plan to perform spontaneous breathing trial today? Yes   Obvious barriers to extubation? Yes   C: Choice of Sedation RASS Goal: 0 Alert and Calm  Need for changes to sedation or analgesia regimen? Yes   D: Delirium CAM-ICU: Negative   E: Early Mobility  Plan for early mobility? Yes   F: Family Engagement Plan for family engagement today? Yes       Antibiotic Review: Continue broad spectrum secondary to severity of illness.     Review of Invasive Devices:      Central access plan: Patient has multiple central venous catheters.       Prophylaxis:  VTE VTE covered by:  apixaban, Per PEG Tube, 5 mg at 10/13/24 1740       Stress Ulcer  not ordered         24 Hour Events :   Bradycardic and hypoxic episodes less frequent since starting benzodiazepine and nightly antipsychotic.  Plan for evaluation by PMNR as well as palliative for ongoing goals of care discussion.  Will reach out to family this afternoon for further discussion.  Subjective   Review of Systems: See HPI for Review of Systems    Objective :                   Vitals I/O      Most Recent Min/Max in 24hrs   Temp 98.4 °F (36.9 °C) Temp  Min: 97.3 °F (36.3 °C)  Max: 98.6 °F (37 °C)   Pulse 69 Pulse  Min: 57  Max: 87   Resp 18 Resp  Min: 18  Max:  18   /73 BP  Min: 84/53  Max: 188/99   O2 Sat 96 % SpO2  Min: 90 %  Max: 100 %      Intake/Output Summary (Last 24 hours) at 10/14/2024 0612  Last data filed at 10/14/2024 0441  Gross per 24 hour   Intake 4592.68 ml   Output 2660 ml   Net 1932.68 ml       Diet Enteral/Parenteral; Tube Feeding No Oral Diet; Jevity 1.5; Continuous; 70; 200; Water; Every 4 hours    Invasive Monitoring           Physical Exam   Physical Exam  Vitals reviewed.   Eyes:      General: No scleral icterus.        Right eye: No discharge.         Left eye: No discharge.   Skin:     General: Skin is cool.      Capillary Refill: Capillary refill takes less than 2 seconds.   HENT:      Head: Normocephalic and atraumatic.      Mouth/Throat:      Mouth: Mucous membranes are moist.      Comments: Profuse saliva from mouth.  Cardiovascular:      Rate and Rhythm: Normal rate and regular rhythm.      Heart sounds: Normal heart sounds.   Musculoskeletal:      Right lower le+ Edema present.      Left lower le+ Edema present.   Abdominal:      Palpations: Abdomen is soft.      Tenderness: There is no abdominal tenderness.   Constitutional:       General: He is in acute distress.      Appearance: He is well-developed. He is ill-appearing, toxic-appearing and diaphoretic.      Interventions: He is sedated, intubated and restrained.   Pulmonary:      Effort: He is intubated.      Breath sounds: No wheezing, rhonchi or rales.   Neurological:      Mental Status: He is agitated.      Motor: Strength full and intact in all extremities.   Genitourinary/Anorectal:  [place]ppard present.        Diagnostic Studies        Lab Results: I have reviewed the following results:     Medications:  Scheduled PRN   apixaban, 5 mg, BID  chlorhexidine, 15 mL, Q12H ISAEL  fludrocortisone, 0.05 mg, Daily  hydrocortisone sodium succinate, 50 mg, Q6H ISAEL  levalbuterol, 1.25 mg, Q6H  midodrine, 15 mg, Q8H ISAEL  piperacillin-tazobactam, 4.5 g, Q8H  polyethylene glycol, 17 g,  Daily  QUEtiapine, 25 mg, HS  senna-docusate sodium, 2 tablet, BID      acetaminophen, 650 mg, Q6H PRN  bisacodyl, 10 mg, Daily PRN  LORazepam, 2 mg, Q4H PRN       Continuous    norepinephrine, 1-30 mcg/min, Last Rate: Stopped (10/14/24 0306)         Labs:   CBC    Recent Labs     10/13/24  0606 10/14/24  0447   WBC 14.24* 9.32   HGB 13.3 13.0   HCT 41.8 40.8    194     BMP    Recent Labs     10/13/24  0606   SODIUM 144   K 3.2*      CO2 35*   AGAP 7   BUN 11   CREATININE 0.78   CALCIUM 8.1*       Coags    No recent results     Additional Electrolytes  Recent Labs     10/13/24  0606 10/14/24  0447   MG 2.1  --    PHOS 2.1*  --    CAIONIZED 1.07* 1.07*          Blood Gas    No recent results  No recent results   LFTs  No recent results    Infectious  No recent results    Glucose  Recent Labs     10/13/24  0606   GLUC 138

## 2024-10-14 NOTE — RESPIRATORY THERAPY NOTE
10/14/24 0912   Respiratory Assessment   Resp Comments [place] vent alarm and went to check on pt. PIP was 55 and VT was 15.  Pt had yellow thick mucus coming out of mouth and nose. SX orally and Manually ventilated till epsisode passed. PIP back down to 21 and .

## 2024-10-14 NOTE — OCCUPATIONAL THERAPY NOTE
Occupational Therapy Cancellation Note     Patient Name: Hemanth Swanson  Today's Date: 10/14/2024     10/14/24 1031   Note Type   Note type Cancelled Session   Cancel Reasons Medical status   Additional Comments OT consult received and chart reviewed. Per ICU mobility rounds pt is not appropriate for participation in OT on this date. Will hold and f/u as medically appropriate.     Merced Forte MS OTR/L   [place] Licensure# [id number]

## 2024-10-15 ENCOUNTER — APPOINTMENT (INPATIENT)
Dept: GASTROENTEROLOGY | Facility: HOSPITAL | Age: 39
DRG: 720 | End: 2024-10-15
Payer: COMMERCIAL

## 2024-10-15 ENCOUNTER — APPOINTMENT (INPATIENT)
Dept: NON INVASIVE DIAGNOSTICS | Facility: HOSPITAL | Age: 39
DRG: 720 | End: 2024-10-15
Payer: COMMERCIAL

## 2024-10-15 ENCOUNTER — TELEPHONE (OUTPATIENT)
Age: 39
End: 2024-10-15

## 2024-10-15 LAB
ANION GAP SERPL CALCULATED.3IONS-SCNC: 4 MMOL/L (ref 4–13)
ANION GAP SERPL CALCULATED.3IONS-SCNC: 4 MMOL/L (ref 4–13)
APICAL FOUR CHAMBER EJECTION FRACTION: 73 %
BSA FOR ECHO PROCEDURE: 2.21 M2
BUN SERPL-MCNC: 16 MG/DL (ref 5–25)
BUN SERPL-MCNC: 16 MG/DL (ref 5–25)
CA-I BLD-SCNC: 1.08 MMOL/L (ref 1.12–1.32)
CALCIUM SERPL-MCNC: 7.8 MG/DL (ref 8.4–10.2)
CALCIUM SERPL-MCNC: 8 MG/DL (ref 8.4–10.2)
CHLORIDE SERPL-SCNC: 107 MMOL/L (ref 96–108)
CHLORIDE SERPL-SCNC: 109 MMOL/L (ref 96–108)
CO2 SERPL-SCNC: 30 MMOL/L (ref 21–32)
CO2 SERPL-SCNC: 33 MMOL/L (ref 21–32)
CREAT SERPL-MCNC: 0.7 MG/DL (ref 0.6–1.3)
CREAT SERPL-MCNC: 0.73 MG/DL (ref 0.6–1.3)
E WAVE DECELERATION TIME: 149 MS
E/A RATIO: 1.02
ERYTHROCYTE [DISTWIDTH] IN BLOOD BY AUTOMATED COUNT: 19.7 % (ref 11.6–15.1)
GFR SERPL CREATININE-BSD FRML MDRD: 118 ML/MIN/1.73SQ M
GFR SERPL CREATININE-BSD FRML MDRD: 120 ML/MIN/1.73SQ M
GLUCOSE SERPL-MCNC: 102 MG/DL (ref 65–140)
GLUCOSE SERPL-MCNC: 98 MG/DL (ref 65–140)
HCT VFR BLD AUTO: 36.7 % (ref 36.5–49.3)
HGB BLD-MCNC: 11.8 G/DL (ref 12–17)
MAGNESIUM SERPL-MCNC: 2.7 MG/DL (ref 1.9–2.7)
MCH RBC QN AUTO: 31.1 PG (ref 26.8–34.3)
MCHC RBC AUTO-ENTMCNC: 32.2 G/DL (ref 31.4–37.4)
MCV RBC AUTO: 97 FL (ref 82–98)
MV E'TISSUE VEL-SEP: 8 CM/S
MV PEAK A VEL: 0.6 M/S
MV PEAK E VEL: 61 CM/S
MV STENOSIS PRESSURE HALF TIME: 43 MS
MV VALVE AREA P 1/2 METHOD: 5.12
PHOSPHATE SERPL-MCNC: 1.8 MG/DL (ref 2.7–4.5)
PHOSPHATE SERPL-MCNC: 2.5 MG/DL (ref 2.7–4.5)
PLATELET # BLD AUTO: 188 THOUSANDS/UL (ref 149–390)
PMV BLD AUTO: 10.7 FL (ref 8.9–12.7)
POTASSIUM SERPL-SCNC: 3.6 MMOL/L (ref 3.5–5.3)
POTASSIUM SERPL-SCNC: 3.9 MMOL/L (ref 3.5–5.3)
RA PRESSURE ESTIMATED: 3 MMHG
RBC # BLD AUTO: 3.8 MILLION/UL (ref 3.88–5.62)
RV PSP: 35 MMHG
SL CV LV EF: 65
SODIUM SERPL-SCNC: 143 MMOL/L (ref 135–147)
SODIUM SERPL-SCNC: 144 MMOL/L (ref 135–147)
TR MAX PG: 32 MMHG
TR PEAK VELOCITY: 2.8 M/S
TRICUSPID VALVE PEAK REGURGITATION VELOCITY: 2.84 M/S
WBC # BLD AUTO: 8.6 THOUSAND/UL (ref 4.31–10.16)

## 2024-10-15 PROCEDURE — 87186 SC STD MICRODIL/AGAR DIL: CPT

## 2024-10-15 PROCEDURE — 31615 TRCHEOBRNCHSC EST TRACHS INC: CPT | Performed by: INTERNAL MEDICINE

## 2024-10-15 PROCEDURE — 82330 ASSAY OF CALCIUM: CPT

## 2024-10-15 PROCEDURE — 94760 N-INVAS EAR/PLS OXIMETRY 1: CPT

## 2024-10-15 PROCEDURE — 99232 SBSQ HOSP IP/OBS MODERATE 35: CPT | Performed by: INTERNAL MEDICINE

## 2024-10-15 PROCEDURE — 93321 DOPPLER ECHO F-UP/LMTD STD: CPT

## 2024-10-15 PROCEDURE — 0BC78ZZ EXTIRPATION OF MATTER FROM LEFT MAIN BRONCHUS, VIA NATURAL OR ARTIFICIAL OPENING ENDOSCOPIC: ICD-10-PCS | Performed by: INTERNAL MEDICINE

## 2024-10-15 PROCEDURE — 94640 AIRWAY INHALATION TREATMENT: CPT

## 2024-10-15 PROCEDURE — 80048 BASIC METABOLIC PNL TOTAL CA: CPT

## 2024-10-15 PROCEDURE — 93308 TTE F-UP OR LMTD: CPT

## 2024-10-15 PROCEDURE — 83735 ASSAY OF MAGNESIUM: CPT

## 2024-10-15 PROCEDURE — 94003 VENT MGMT INPAT SUBQ DAY: CPT

## 2024-10-15 PROCEDURE — 85027 COMPLETE CBC AUTOMATED: CPT

## 2024-10-15 PROCEDURE — 87205 SMEAR GRAM STAIN: CPT

## 2024-10-15 PROCEDURE — 93325 DOPPLER ECHO COLOR FLOW MAPG: CPT | Performed by: INTERNAL MEDICINE

## 2024-10-15 PROCEDURE — 94150 VITAL CAPACITY TEST: CPT

## 2024-10-15 PROCEDURE — 93325 DOPPLER ECHO COLOR FLOW MAPG: CPT

## 2024-10-15 PROCEDURE — 93308 TTE F-UP OR LMTD: CPT | Performed by: INTERNAL MEDICINE

## 2024-10-15 PROCEDURE — 84100 ASSAY OF PHOSPHORUS: CPT

## 2024-10-15 PROCEDURE — 87070 CULTURE OTHR SPECIMN AEROBIC: CPT

## 2024-10-15 PROCEDURE — 93321 DOPPLER ECHO F-UP/LMTD STD: CPT | Performed by: INTERNAL MEDICINE

## 2024-10-15 PROCEDURE — 99233 SBSQ HOSP IP/OBS HIGH 50: CPT

## 2024-10-15 RX ORDER — CALCIUM GLUCONATE 20 MG/ML
2 INJECTION, SOLUTION INTRAVENOUS ONCE
Status: COMPLETED | OUTPATIENT
Start: 2024-10-15 | End: 2024-10-15

## 2024-10-15 RX ORDER — LORAZEPAM 2 MG/ML
2 INJECTION INTRAMUSCULAR
Status: DISCONTINUED | OUTPATIENT
Start: 2024-10-15 | End: 2024-10-16

## 2024-10-15 RX ORDER — LORAZEPAM 2 MG/ML
2 INJECTION INTRAMUSCULAR EVERY 4 HOURS PRN
Status: DISCONTINUED | OUTPATIENT
Start: 2024-10-15 | End: 2024-10-15

## 2024-10-15 RX ORDER — LIDOCAINE HYDROCHLORIDE 20 MG/ML
INJECTION, SOLUTION EPIDURAL; INFILTRATION; INTRACAUDAL; PERINEURAL
Status: COMPLETED
Start: 2024-10-15 | End: 2024-10-15

## 2024-10-15 RX ORDER — POTASSIUM CHLORIDE 20MEQ/15ML
40 LIQUID (ML) ORAL ONCE
Status: DISCONTINUED | OUTPATIENT
Start: 2024-10-15 | End: 2024-10-15

## 2024-10-15 RX ORDER — LIDOCAINE HYDROCHLORIDE 10 MG/ML
INJECTION, SOLUTION EPIDURAL; INFILTRATION; INTRACAUDAL; PERINEURAL
Status: DISCONTINUED
Start: 2024-10-15 | End: 2024-10-15 | Stop reason: WASHOUT

## 2024-10-15 RX ORDER — POTASSIUM CHLORIDE 20MEQ/15ML
20 LIQUID (ML) ORAL ONCE
Status: COMPLETED | OUTPATIENT
Start: 2024-10-15 | End: 2024-10-15

## 2024-10-15 RX ADMIN — PIPERACILLIN SODIUM AND TAZOBACTAM SODIUM 4.5 G: 36; 4.5 INJECTION, POWDER, LYOPHILIZED, FOR SOLUTION INTRAVENOUS at 16:29

## 2024-10-15 RX ADMIN — LEVALBUTEROL HYDROCHLORIDE 1.25 MG: 1.25 SOLUTION RESPIRATORY (INHALATION) at 07:27

## 2024-10-15 RX ADMIN — MIDODRINE HYDROCHLORIDE 15 MG: 5 TABLET ORAL at 16:28

## 2024-10-15 RX ADMIN — Medication 4 ML: at 07:27

## 2024-10-15 RX ADMIN — MIDODRINE HYDROCHLORIDE 15 MG: 5 TABLET ORAL at 06:06

## 2024-10-15 RX ADMIN — LEVALBUTEROL HYDROCHLORIDE 1.25 MG: 1.25 SOLUTION RESPIRATORY (INHALATION) at 02:42

## 2024-10-15 RX ADMIN — APIXABAN 5 MG: 5 TABLET, FILM COATED ORAL at 17:58

## 2024-10-15 RX ADMIN — OXYCODONE HYDROCHLORIDE 5 MG: 5 TABLET ORAL at 16:28

## 2024-10-15 RX ADMIN — MIDODRINE HYDROCHLORIDE 15 MG: 5 TABLET ORAL at 21:37

## 2024-10-15 RX ADMIN — PIPERACILLIN SODIUM AND TAZOBACTAM SODIUM 4.5 G: 36; 4.5 INJECTION, POWDER, LYOPHILIZED, FOR SOLUTION INTRAVENOUS at 08:36

## 2024-10-15 RX ADMIN — POTASSIUM CHLORIDE 20 MEQ: 1.5 SOLUTION ORAL at 09:06

## 2024-10-15 RX ADMIN — CHLORHEXIDINE GLUCONATE 15 ML: 1.2 RINSE ORAL at 21:37

## 2024-10-15 RX ADMIN — Medication 4 ML: at 13:54

## 2024-10-15 RX ADMIN — Medication 4 ML: at 19:20

## 2024-10-15 RX ADMIN — CHLORHEXIDINE GLUCONATE 15 ML: 1.2 RINSE ORAL at 08:36

## 2024-10-15 RX ADMIN — PIPERACILLIN SODIUM AND TAZOBACTAM SODIUM 4.5 G: 36; 4.5 INJECTION, POWDER, LYOPHILIZED, FOR SOLUTION INTRAVENOUS at 01:00

## 2024-10-15 RX ADMIN — LEVALBUTEROL HYDROCHLORIDE 1.25 MG: 1.25 SOLUTION RESPIRATORY (INHALATION) at 19:20

## 2024-10-15 RX ADMIN — LEVALBUTEROL HYDROCHLORIDE 1.25 MG: 1.25 SOLUTION RESPIRATORY (INHALATION) at 13:54

## 2024-10-15 RX ADMIN — APIXABAN 5 MG: 5 TABLET, FILM COATED ORAL at 08:36

## 2024-10-15 RX ADMIN — LIDOCAINE HYDROCHLORIDE 100 MG: 20 INJECTION, SOLUTION EPIDURAL; INFILTRATION; INTRACAUDAL; PERINEURAL at 12:00

## 2024-10-15 RX ADMIN — POTASSIUM & SODIUM PHOSPHATES POWDER PACK 280-160-250 MG 2 PACKET: 280-160-250 PACK at 17:58

## 2024-10-15 RX ADMIN — CALCIUM GLUCONATE 2 G: 20 INJECTION, SOLUTION INTRAVENOUS at 09:06

## 2024-10-15 RX ADMIN — FLUDROCORTISONE ACETATE 0.05 MG: 0.1 TABLET ORAL at 08:35

## 2024-10-15 RX ADMIN — Medication 4 ML: at 02:42

## 2024-10-15 RX ADMIN — OXYCODONE HYDROCHLORIDE 5 MG: 5 TABLET ORAL at 09:06

## 2024-10-15 RX ADMIN — POTASSIUM & SODIUM PHOSPHATES POWDER PACK 280-160-250 MG 2 PACKET: 280-160-250 PACK at 09:06

## 2024-10-15 RX ADMIN — QUETIAPINE FUMARATE 25 MG: 25 TABLET ORAL at 21:37

## 2024-10-15 RX ADMIN — LORAZEPAM 2 MG: 2 INJECTION INTRAMUSCULAR; INTRAVENOUS at 09:27

## 2024-10-15 NOTE — PLAN OF CARE
Problem: Prexisting or High Potential for Compromised Skin Integrity  Goal: Skin integrity is maintained or improved  Description: INTERVENTIONS:  - Identify patients at risk for skin breakdown  - Assess and monitor skin integrity  - Assess and monitor nutrition and hydration status  - Monitor labs   - Assess for incontinence   - Turn and reposition patient  - Assist with mobility/ambulation  - Relieve pressure over bony prominences  - Avoid friction and shearing  - Provide appropriate hygiene as needed including keeping skin clean and dry  - Evaluate need for skin moisturizer/barrier cream  - Collaborate with interdisciplinary team   - Patient/family teaching  - Consider wound care consult   Outcome: Progressing     Problem: INFECTION - ADULT  Goal: Absence or prevention of progression during hospitalization  Description: INTERVENTIONS:  - Assess and monitor for signs and symptoms of infection  - Monitor lab/diagnostic results  - Monitor all insertion sites, i.e. indwelling lines, tubes, and drains  - Monitor endotracheal if appropriate and nasal secretions for changes in amount and color  - [place] appropriate cooling/warming therapies per order  - Administer medications as ordered  - Instruct and encourage patient and family to use good hand hygiene technique  - Identify and instruct in appropriate isolation precautions for identified infection/condition  Outcome: Progressing  Goal: Absence of fever/infection during neutropenic period  Description: INTERVENTIONS:  - Monitor WBC    Outcome: Progressing     Problem: SAFETY,RESTRAINT: NV/NON-SELF DESTRUCTIVE BEHAVIOR  Goal: Remains free of harm/injury (restraint for non violent/non self-detsructive behavior)  Description: INTERVENTIONS:  - Instruct patient/family regarding restraint use   - Assess and monitor physiologic and psychological status   - Provide interventions and comfort measures to meet assessed patient needs   - Identify and implement measures to  help patient regain control  - Assess readiness for release of restraint   Outcome: Progressing  Goal: Returns to optimal restraint-free functioning  Description: INTERVENTIONS:  - Assess the patient's behavior and symptoms that indicate continued need for restraint  - Identify and implement measures to help patient regain control  - Assess readiness for release of restraint   Outcome: Progressing     Problem: Knowledge Deficit  Goal: Patient/family/caregiver demonstrates understanding of disease process, treatment plan, medications, and discharge instructions  Description: Complete learning assessment and assess knowledge base.  Interventions:  - Provide teaching at level of understanding  - Provide teaching via preferred learning methods  Outcome: Progressing     Problem: Nutrition/Hydration-ADULT  Goal: Nutrient/Hydration intake appropriate for improving, restoring or maintaining nutritional needs  Description: Monitor and assess patient's nutrition/hydration status for malnutrition. Collaborate with interdisciplinary team and initiate plan and interventions as ordered.  Monitor patient's weight and dietary intake as ordered or per policy. Utilize nutrition screening tool and intervene as necessary. Determine patient's food preferences and provide high-protein, high-caloric foods as appropriate.     INTERVENTIONS:  - Monitor oral intake, urinary output, labs, and treatment plans  - Assess nutrition and hydration status and recommend course of action  - Evaluate amount of meals eaten  - Assist patient with eating if necessary   - Allow adequate time for meals  - Recommend/ encourage appropriate diets, oral nutritional supplements, and vitamin/mineral supplements  - Order, calculate, and assess calorie counts as needed  - Recommend, monitor, and adjust tube feedings and TPN/PPN based on assessed needs  - Assess need for intravenous fluids  - Provide specific nutrition/hydration education as appropriate  - Include  patient/family/caregiver in decisions related to nutrition  Outcome: Progressing

## 2024-10-15 NOTE — ASSESSMENT & PLAN NOTE
Repeat episode of seizure like activity this morning.  No evidence of seizure on EEG.  Episodes relieved by ativan.  He received 100 mg IM biweekly testosterone with long-lasting formulation.  Can consider repeating testosterone levels after 1 week.

## 2024-10-15 NOTE — ASSESSMENT & PLAN NOTE
Decisional apparatus:  Patient is not competent on exam today.  If competency is lost, patient's substitute decision maker would default to mother by [place] Act 169.  Advance Directive / Living Will / POLST / POA Forms: Has POA documents at home. Sister Dmitry Swanson is POA. She was asked to bring a copy into the hospital so that it can be reviewed and entered into EMR.    Dmitry states she gave a copy to staff today to be scanned into chart.    Goals  Level 1 code status.   Full code.   Disease focused care.   No limits placed.   Confirmed goals today with sister Dmitry  10/15/24  Spoke with sister Dmitry/CRISELDA who states patient wants all interventions to prolong survivability  Sister reports frequent discussions with patient regarding his QoL and goals for treatment; patient motivated by being around for his children and has hopes of recovery  He would still want life-prolonging cares even if there is no chance of recovery  His wish is to never be placed in a facility, short or long term and wants to remain at his sisters home. Sister, grandmother and best friend are all home healthcare nurses who provided 24/7 support to patient at home  Plan to return home upon discharge.   Sister requesting help setting up PT/OT at home.  Family would like all the home support they can get and are interested in following with palliative care. They prefer home palliative care. Will place on hospital follow up list.

## 2024-10-15 NOTE — ASSESSMENT & PLAN NOTE
Neuromuscular disease post chemotherapyS/P tracheostomy in 9/2023.  Vent dependent 24/7: current vent settings 14/500/6/60  Last bronchoscopy 6/1 with cultures growing serratia and pseudomonas.  Chest X-ray 10/5: Left sided opacification with possible mucus plugging.  CXR 10/7: Near complete opacification of the left hemithorax with leftward mediastinal shift, mildly improved from 10/5/2024, suggesting baseline underlying atelectasis and pleural effusion.   Chest x-ray 10/9 seems improved from prior.    Plan:  Continue vent support  Xopnex nebs TID  Resume hypertonic saline nebs  Continue mucomyst

## 2024-10-15 NOTE — UTILIZATION REVIEW
Continued Stay Review    Date:   10/15/24                          Current Patient Class: Inpatient  Current Level of Care:  Critical Care    HPI:39 y.o. male initially admitted on   10/5/24 with Sepsis  Acute/chronic hypoxic  and hypercarbic respiratory failure  Vent dependent    Assessment/Plan:   10/15  Remains on  ENDY, day  7/10.  Vent dependent  24/7.  Hypoxic  and bradycardic episodes  less frequent  since starting  benzo  and nightly antipsychotic.   Peg tube intact and remains on tube feeds.  Profuse saliva noted from mouth.    Remains on tele.  Continue routine trach care.  Needs  PT/OT.   Continue current meds/treatment plan.    Medications:   Scheduled Medications:  apixaban, 5 mg, Per PEG Tube, BID  chlorhexidine, 15 mL, Mouth/Throat, Q12H ISAEL  levalbuterol, 1.25 mg, Nebulization, Q6H  midodrine, 15 mg, Oral, Q8H ISAEL  oxyCODONE, 5 mg, Oral, Q6H  piperacillin-tazobactam, 4.5 g, Intravenous, Q8H  polyethylene glycol, 17 g, Per PEG Tube, Daily  QUEtiapine, 25 mg, Oral, HS  senna-docusate sodium, 2 tablet, Per PEG Tube, BID  sodium chloride, 4 mL, Nebulization, Q6H      Continuous IV Infusions:     PRN Meds:  acetaminophen, 650 mg, Per PEG Tube, Q6H PRN  bisacodyl, 10 mg, Rectal, Daily PRN  LORazepam, 2 mg, Intravenous, HS PRN      Discharge Plan:  Home with family and  24/7  care  Vital Signs (last 3 days)       Date/Time Temp Pulse Resp BP MAP (mmHg) SpO2 O2 Device Patient Position - Orthostatic VS Purvi Coma Scale Score Pain    10/15/24 1100 96.4 °F (35.8 °C) 56 -- 98/58 72 93 % -- -- -- --    10/15/24 1055 -- -- -- -- -- 89 % -- -- -- --    10/15/24 1000 97.2 °F (36.2 °C) 61 -- 92/57 70 94 % -- -- -- --    10/15/24 0900 97.7 °F (36.5 °C) 82 -- 138/69 95 98 % -- -- -- --    10/15/24 0800 97.7 °F (36.5 °C) 76 -- 118/56 78 89 % -- -- 11 --    10/15/24 0727 -- -- -- -- -- 91 % -- -- -- --    10/15/24 0720 -- -- -- -- -- -- Ventilator Lying -- --    10/15/24 0600 97.5 °F (36.4 °C) 68 -- 93/53 65 91 % -- --  -- --    10/15/24 0500 97.5 °F (36.4 °C) 73 -- 100/59 74 94 % -- -- -- --    10/15/24 0400 97.7 °F (36.5 °C) 76 -- 94/55 68 93 % -- -- 11 No Pain    10/15/24 0300 97.9 °F (36.6 °C) 67 -- 91/48 67 94 % -- -- -- --    10/15/24 0242 -- -- -- -- -- 94 % -- -- -- --    10/15/24 0200 97.9 °F (36.6 °C) 55 -- 102/58 75 95 % -- -- -- --    10/15/24 0100 97.9 °F (36.6 °C) 56 -- 96/54 70 93 % -- -- -- --    10/15/24 0000 97.9 °F (36.6 °C) 69 18 83/51 63 92 % Ventilator Lying 11 --    10/14/24 2300 97.9 °F (36.6 °C) 60 -- 100/57 73 94 % -- -- -- --    10/14/24 2234 -- -- -- -- -- -- -- -- -- Med Not Given for Pain - for MAR use only    10/14/24 2208 -- -- -- -- -- 94 % -- -- -- --    10/14/24 2200 97.3 °F (36.3 °C) 74 -- 99/58 72 93 % -- -- -- --    10/14/24 2100 96.4 °F (35.8 °C) 64 -- 93/51 66 92 % -- -- -- --    10/14/24 2000 95.7 °F (35.4 °C) 68 -- 92/52 66 89 % -- -- 11 --    10/14/24 1903 -- -- -- -- -- 91 % -- -- -- --    10/14/24 1900 95.2 °F (35.1 °C) 55 -- 92/55 67 91 % -- -- -- --    10/14/24 1845 95.2 °F (35.1 °C) -- -- -- -- -- -- -- -- --    10/14/24 1830 95.2 °F (35.1 °C) -- -- -- -- -- -- -- -- --    10/14/24 1815 95.4 °F (35.2 °C) -- -- -- -- -- -- -- -- --    10/14/24 1800 95.4 °F (35.2 °C) 54 -- 93/54 67 90 % -- -- -- --    10/14/24 1700 96.6 °F (35.9 °C) 55 -- 102/57 74 91 % -- -- -- --    10/14/24 1600 97.5 °F (36.4 °C) 70 -- 141/94 112 94 % -- -- 10 --    10/14/24 1500 97.3 °F (36.3 °C) 68 -- 135/90 107 95 % -- -- -- --    10/14/24 1441 -- -- -- -- -- 96 % -- -- -- --    10/14/24 1400 97.7 °F (36.5 °C) 90 -- 118/77 93 97 % -- -- -- --    10/14/24 1335 -- -- -- -- -- 96 % -- -- -- --    10/14/24 1300 97.5 °F (36.4 °C) 84 -- 117/79 94 95 % -- -- -- --    10/14/24 1200 97.7 °F (36.5 °C) 60 -- 148/105 122 98 % -- -- 10 --    10/14/24 1120 -- -- -- -- -- 95 % -- -- -- --    10/14/24 1100 97.7 °F (36.5 °C) 79 -- 132/90 103 97 % -- -- -- --    10/14/24 1000 97.3 °F (36.3 °C) 66 -- 156/93 117 89 % -- -- -- --     10/14/24 0954 -- -- -- -- -- 100 % -- -- -- --    10/14/24 0900 97 °F (36.1 °C) 77 -- 135/91 108 94 % -- -- -- --    10/14/24 0800 97.9 °F (36.6 °C) 76 -- 90/55 67 94 % -- -- 10 No Pain    10/14/24 0731 -- -- -- -- -- 99 % -- -- -- --    10/14/24 0719 -- -- -- -- -- -- Ventilator Lying -- --    10/14/24 0700 98.2 °F (36.8 °C) 60 -- 152/88 113 100 % -- -- -- --    10/14/24 0630 98.2 °F (36.8 °C) 81 -- 109/71 85 99 % -- -- -- --    10/14/24 0615 98.2 °F (36.8 °C) 93 -- 108/66 82 97 % -- -- -- --    10/14/24 0600 98.2 °F (36.8 °C) 93 -- 118/74 90 99 % -- -- -- --    10/14/24 0545 98.4 °F (36.9 °C) 66 -- 119/84 96 100 % -- -- -- --    10/14/24 0530 98.4 °F (36.9 °C) 88 -- 106/66 78 98 % -- -- -- --    10/14/24 0515 98.4 °F (36.9 °C) 81 -- 111/70 86 100 % -- -- -- --    10/14/24 0500 98.4 °F (36.9 °C) 82 -- 114/68 86 97 % -- -- -- --    10/14/24 0445 98.4 °F (36.9 °C) 85 -- 109/63 81 96 % -- -- -- --    10/14/24 0430 98.2 °F (36.8 °C) 79 -- 116/60 82 95 % -- -- -- --    10/14/24 0415 98.1 °F (36.7 °C) 68 -- 116/66 86 98 % -- -- -- --    10/14/24 0400 98.2 °F (36.8 °C) 81 -- 103/56 72 95 % -- -- 13 --    10/14/24 0345 98.4 °F (36.9 °C) 88 -- 119/55 79 96 % -- -- -- --    10/14/24 0330 98.4 °F (36.9 °C) 92 -- 111/55 79 95 % -- -- -- --    10/14/24 0315 98.4 °F (36.9 °C) 88 -- 126/58 84 96 % -- -- -- --    10/14/24 0300 98.4 °F (36.9 °C) 69 -- 122/73 93 96 % -- -- -- --    10/14/24 0246 -- -- -- -- -- 94 % -- -- -- --    10/14/24 0245 98.6 °F (37 °C) 73 -- 101/60 75 93 % -- -- -- --    10/14/24 0230 98.6 °F (37 °C) 79 -- 107/63 79 93 % -- -- -- --    10/14/24 0215 98.6 °F (37 °C) 74 -- 118/73 89 95 % -- -- -- --    10/14/24 0200 98.4 °F (36.9 °C) 71 -- 121/72 92 93 % -- -- -- --    10/14/24 0145 98.6 °F (37 °C) 73 -- 103/58 75 92 % -- -- -- --    10/14/24 0130 98.6 °F (37 °C) 75 -- 97/57 70 93 % -- -- -- --    10/14/24 0115 98.6 °F (37 °C) 79 -- 99/51 71 93 % -- -- -- --    10/14/24 0100 98.6 °F (37 °C) 65 -- 121/73 92 96 %  -- -- -- --    10/14/24 0045 98.4 °F (36.9 °C) 77 -- 106/59 76 94 % -- -- -- --    10/14/24 0030 98.2 °F (36.8 °C) 69 -- 156/98 123 93 % -- -- -- --    10/14/24 0015 98.1 °F (36.7 °C) 67 -- 122/72 90 95 % -- -- -- --    10/14/24 0005 -- -- -- -- -- 100 % -- -- -- --    10/14/24 0000 97.9 °F (36.6 °C) 70 -- 127/75 96 94 % -- -- 13 --    10/13/24 2345 98.1 °F (36.7 °C) 62 -- 122/71 90 94 % -- -- -- --    10/13/24 2330 98.1 °F (36.7 °C) 61 -- 132/76 98 94 % -- -- -- --    10/13/24 2315 98.1 °F (36.7 °C) 62 -- 125/73 93 94 % -- -- -- --    10/13/24 2300 98.1 °F (36.7 °C) 71 -- 95/57 69 92 % -- -- -- --    10/13/24 2245 97.9 °F (36.6 °C) 63 -- 113/68 85 95 % -- -- -- --    10/13/24 2230 97.9 °F (36.6 °C) 62 -- 118/71 90 95 % -- -- -- --    10/13/24 2215 97.9 °F (36.6 °C) 70 -- 99/59 73 94 % -- -- -- --    10/13/24 2200 97.7 °F (36.5 °C) 77 -- 100/60 74 94 % -- -- -- --    10/13/24 2145 97.5 °F (36.4 °C) 70 -- 107/66 82 95 % -- -- -- --    10/13/24 2130 97.9 °F (36.6 °C) 71 -- 99/60 74 93 % -- -- -- --    10/13/24 2115 97.9 °F (36.6 °C) 65 -- 109/67 82 95 % -- -- -- --    10/13/24 2100 97.7 °F (36.5 °C) 79 -- 97/53 69 94 % -- -- -- --    10/13/24 2030 97.9 °F (36.6 °C) 67 -- 102/55 73 93 % -- -- -- --    10/13/24 2015 97.9 °F (36.6 °C) 71 -- 101/56 71 94 % -- -- -- --    10/13/24 2000 98.1 °F (36.7 °C) 71 -- 99/58 70 94 % -- -- 13 --    10/13/24 1945 98.1 °F (36.7 °C) 83 -- 93/54 69 94 % -- -- -- --    10/13/24 1930 98.1 °F (36.7 °C) 87 -- 105/62 76 90 % -- -- -- --    10/13/24 1920 -- -- -- -- -- 93 % -- -- -- --    10/13/24 1915 97.9 °F (36.6 °C) 72 -- 162/96 123 94 % -- -- -- --    10/13/24 1900 97.9 °F (36.6 °C) 62 -- 118/71 86 92 % -- -- -- --    10/13/24 1700 97.9 °F (36.6 °C) 66 -- 116/63 79 92 % -- -- -- --    10/13/24 1600 97.7 °F (36.5 °C) 68 -- 188/99 134 97 % -- -- -- --    10/13/24 1545 -- -- -- -- -- 92 % -- -- -- --    10/13/24 1500 97.3 °F (36.3 °C) 67 -- 131/74 97 95 % -- -- -- --    10/13/24 1400 97.5 °F  (36.4 °C) 69 -- 84/53 64 90 % -- -- -- --    10/13/24 1353 -- -- -- -- -- 90 % -- -- -- --    10/13/24 1300 97.9 °F (36.6 °C) 72 -- 93/58 70 92 % -- -- -- --    10/13/24 1200 98.4 °F (36.9 °C) 57 -- 147/90 114 97 % -- -- -- --    10/13/24 1107 -- -- -- -- -- 96 % -- -- -- --    10/13/24 1100 98.1 °F (36.7 °C) 67 -- 111/70 86 96 % -- -- -- --    10/13/24 1000 97.9 °F (36.6 °C) 67 -- 105/60 76 94 % -- -- -- --    10/13/24 0900 97.7 °F (36.5 °C) 71 -- 128/83 101 93 % -- -- -- --    10/13/24 0800 97.9 °F (36.6 °C) 73 -- 120/73 92 99 % -- -- -- --    10/13/24 0700 98.2 °F (36.8 °C) 64 18 131/85 102 96 % Trach mask Lying -- --    10/13/24 0615 98.4 °F (36.9 °C) 82 -- 103/66 79 97 % -- -- -- --    10/13/24 0600 98.4 °F (36.9 °C) 66 -- 100/58 72 94 % -- -- -- --    10/13/24 0545 98.6 °F (37 °C) 74 -- 102/59 77 94 % -- -- -- --    10/13/24 0530 98.6 °F (37 °C) 61 -- 159/91 119 99 % -- -- -- --    10/13/24 0515 98.4 °F (36.9 °C) 78 -- 114/73 89 96 % -- -- -- --    10/13/24 0500 98.4 °F (36.9 °C) 75 -- 110/71 85 96 % -- -- -- --    10/13/24 0430 98.4 °F (36.9 °C) 72 -- 110/65 83 94 % -- -- -- --    10/13/24 0415 98.4 °F (36.9 °C) 73 -- 100/58 73 93 % -- -- -- --    10/13/24 0400 98.4 °F (36.9 °C) 70 -- 108/63 80 93 % -- -- 13 --    10/13/24 0247 -- -- -- -- -- 94 % -- -- -- --    10/13/24 0000 -- -- -- -- -- -- -- -- 13 --    10/12/24 2351 -- -- -- -- -- 92 % -- -- -- --    10/12/24 2215 98.1 °F (36.7 °C) 59 -- 90/52 66 99 % -- -- -- --    10/12/24 2200 97.9 °F (36.6 °C) 65 -- 97/56 70 91 % -- -- -- --    10/12/24 2145 97.9 °F (36.6 °C) 65 -- 102/58 74 91 % -- -- -- --    10/12/24 2130 97.9 °F (36.6 °C) 62 -- 103/56 73 91 % -- -- -- --    10/12/24 2115 97.9 °F (36.6 °C) 63 -- 98/56 71 91 % -- -- -- --    10/12/24 2100 97.7 °F (36.5 °C) 71 -- 91/51 65 91 % -- -- -- --    10/12/24 2045 97.7 °F (36.5 °C) 74 -- 95/53 68 91 % -- -- -- --    10/12/24 2030 97.7 °F (36.5 °C) 82 -- 96/57 71 91 % -- -- -- --    10/12/24 2015 97.7 °F  (36.5 °C) 73 -- 107/69 82 92 % -- -- -- --    10/12/24 2000 97.7 °F (36.5 °C) 67 -- 116/66 86 98 % -- -- 13 --    10/12/24 1945 97.9 °F (36.6 °C) 72 -- 107/64 80 95 % -- -- -- --    10/12/24 1930 97.9 °F (36.6 °C) 66 -- 117/74 90 97 % -- -- -- --    10/12/24 1925 -- -- -- -- -- 96 % -- -- -- --    10/12/24 1915 97.9 °F (36.6 °C) 64 -- 127/81 99 97 % -- -- -- --    10/12/24 1900 97.7 °F (36.5 °C) 67 -- 119/75 92 96 % -- -- -- --    10/12/24 1600 -- -- -- -- -- -- -- -- 13 --    10/12/24 1502 -- -- -- -- -- 98 % -- -- -- --    10/12/24 1340 -- -- -- -- -- 97 % -- -- -- --    10/12/24 1200 -- -- -- -- -- -- -- -- 13 --    10/12/24 1035 -- -- -- -- -- 97 % -- -- -- --    10/12/24 1021 -- -- -- -- -- -- -- -- -- Med Not Given for Pain - for MAR use only    10/12/24 1000 96.6 °F (35.9 °C) 63 -- 135/90 109 99 % -- -- -- --    10/12/24 0900 96.6 °F (35.9 °C) 60 -- 125/76 95 98 % -- -- -- --    10/12/24 0800 97.2 °F (36.2 °C) 57 -- 123/76 95 99 % -- -- 13 --    10/12/24 0743 -- -- -- -- -- 99 % -- -- -- --    10/12/24 0700 97.9 °F (36.6 °C) 58 -- 143/83 108 100 % -- -- -- --    10/12/24 0630 98.1 °F (36.7 °C) 66 -- 109/65 80 100 % -- -- -- --    10/12/24 0615 98.2 °F (36.8 °C) 68 -- 109/65 83 100 % -- -- -- --    10/12/24 0600 98.2 °F (36.8 °C) 66 -- 122/77 92 99 % -- -- -- --    10/12/24 0545 98.4 °F (36.9 °C) 69 -- 122/74 93 99 % -- -- -- --    10/12/24 0530 98.2 °F (36.8 °C) 60 -- 114/66 84 99 % -- -- -- --    10/12/24 0515 98.1 °F (36.7 °C) 61 -- 130/72 94 98 % -- -- -- --    10/12/24 0500 98.1 °F (36.7 °C) 60 -- 114/66 84 99 % -- -- -- --    10/12/24 0355 -- -- -- -- -- 99 % -- -- -- --    10/12/24 0315 98.4 °F (36.9 °C) 65 -- 117/70 88 99 % -- -- -- --    10/12/24 0300 98.4 °F (36.9 °C) 64 -- 110/62 80 98 % -- -- -- --    10/12/24 0245 98.4 °F (36.9 °C) 64 -- 105/57 74 98 % -- -- -- --    10/12/24 0230 98.2 °F (36.8 °C) 63 -- 104/57 75 98 % -- -- -- --    10/12/24 0215 98.2 °F (36.8 °C) 65 -- 101/56 70 98 % -- -- -- --     10/12/24 0200 98.2 °F (36.8 °C) 63 -- 100/57 72 98 % -- -- -- --    10/12/24 0145 98.2 °F (36.8 °C) 61 -- 105/61 77 99 % -- -- -- --    10/12/24 0143 -- -- -- -- -- 99 % -- -- -- --    10/12/24 0130 98.1 °F (36.7 °C) 60 -- 101/57 72 98 % -- -- -- --    10/12/24 0115 98.1 °F (36.7 °C) 61 -- 101/56 72 98 % -- -- -- --    10/12/24 0100 97.9 °F (36.6 °C) 62 -- 99/57 71 98 % -- -- -- --    10/12/24 0045 98.1 °F (36.7 °C) 62 -- 103/59 74 98 % -- -- -- --    10/12/24 0034 -- -- -- -- -- 98 % -- -- -- --    10/12/24 0030 97.9 °F (36.6 °C) 61 -- 103/61 77 98 % -- -- -- --    10/12/24 0015 97.9 °F (36.6 °C) 63 -- 95/56 70 97 % -- -- -- --    10/12/24 0000 97.7 °F (36.5 °C) 61 -- 105/59 77 98 % -- -- 13 --          Weight (last 2 days)       Date/Time Weight    10/15/24 0624 90.6 (199.74)            Pertinent Labs/Diagnostic Results:   Radiology:  XR chest portable ICU   Final Interpretation by Jewel Burton MD (10/15 7321)      Opacification at both lung bases is unchanged likely due to atelectasis. Infiltrates and pleural effusions not excluded.            Workstation performed: [id number]         CT chest abdomen pelvis w contrast   Final Interpretation by Philippe Coates MD (10/11 2530)         1. Moderate bibasilar pulmonary opacification consistent with atelectasis; superimposed pneumonia not entirely excludable. Trace left greater than right pleural effusions.   2. No acute abnormality identified in the abdomen or pelvis.   3. Additional findings as noted.               Workstation performed: [id number]         XR chest portable ICU   Final Interpretation by Brendon Snyder MD (10/11 0356)      Left basilar effusion and atelectasis redemonstrated. Milder right basilar effusion/subsegmental atelectasis, also similar.         Workstation performed: [id number]         CT head wo contrast   Final Interpretation by Anid Mccloud MD (10/09 2001)      No acute intracranial abnormality.                   Workstation performed: [id number]         XR chest portable ICU   Final Interpretation by Andrea Bai MD (10/09 0823)      Left basilar effusion and atelectasis redemonstrated. Milder right basilar effusion/subsegmental atelectasis, also similar.            Workstation performed: [id number]         XR chest portable ICU   Final Interpretation by Carlos Atkinson MD (10/07 1303)      Near complete opacification of the left hemithorax with leftward mediastinal shift, mildly improved from 10/5/2024, suggesting baseline underlying atelectasis and pleural effusion.      Note, left lung pneumonia cannot be excluded.      No pneumothorax.            Resident: SONAM KNOX I, the attending radiologist, have reviewed the images and agree with the final report above.      Workstation performed: [id number]         MRI inpatient order    (Results Pending)     Cardiology:  ECG 12 lead   Final Result by Manuela Ayala MD (10/09 1848)   Sinus bradycardia   RSR' or QR pattern in V1 suggests right ventricular conduction delay   Nonspecific ST and T wave abnormality   Abnormal ECG   When compared with ECG of 05-OCT-2024 19:36,   Vent. rate has decreased BY  63 BPM   RSR' pattern in V1 is now Present   Confirmed by Manuela Ayala ([id number]) on 10/9/2024 6:48:48 PM        GI:  Bronchoscopy   Final Result by Silvio Alas MD (10/06 8277)   All observed locations appeared normal, including the pharynx, larynx,    vocal cords, trachea, main prashant, left lung and right lung.   Excessive, thick and white secretions present in the lingula and LLL;    performed therapeutic aspiration         RECOMMENDATION:   Follow up cultures   Continue to monitor in ICU                 Results from last 7 days   Lab Units 10/15/24  0436 10/14/24  0447 10/13/24  0606 10/12/24  0517 10/11/24  0505 10/10/24  0416 10/09/24  0421   WBC Thousand/uL 8.60 9.32 14.24* 14.60* 11.51*   < > 13.16*   HEMOGLOBIN g/dL 11.8* 13.0  13.3 12.5 12.1   < > 12.7   HEMATOCRIT % 36.7 40.8 41.8 40.0 39.1   < > 43.2   PLATELETS Thousands/uL 188 194 216 220 169   < > 188   TOTAL NEUT ABS Thousands/µL  --   --  12.42* 12.64*  --   --  11.01*    < > = values in this interval not displayed.         Results from last 7 days   Lab Units 10/15/24  0436 10/14/24  2245 10/14/24  1735 10/14/24  0447 10/13/24  0606 10/12/24  0517 10/11/24  0505 10/10/24  1032 10/10/24  0415   SODIUM mmol/L 144 146 147 144 144 141 144   < >  --    POTASSIUM mmol/L 3.6 3.6 2.9* 2.8* 3.2* 3.9 3.4*   < >  --    CHLORIDE mmol/L 107 107 106 100 102 105 108   < >  --    CO2 mmol/L 33* 34* 33* 36* 35* 29 30   < >  --    ANION GAP mmol/L 4 5 8 8 7 7 6   < >  --    BUN mg/dL 16 15 15 14 11 6 7   < >  --    CREATININE mg/dL 0.70 0.69 0.64 0.71 0.78 0.65 0.69   < >  --    EGFR ml/min/1.73sq m 120 121 125 119 115 124 121   < >  --    CALCIUM mg/dL 7.8* 7.9* 8.0* 8.2* 8.1* 8.2* 7.9*   < >  --    CALCIUM, IONIZED mmol/L 1.08*  --   --  1.07* 1.07* 1.06*  --   --  1.12   MAGNESIUM mg/dL 2.7  --  2.2  --  2.1 2.2 2.3  --  2.5   PHOSPHORUS mg/dL 1.8*  --  2.2* 2.6* 2.1* 1.8* 3.2  --  3.0    < > = values in this interval not displayed.         Results from last 7 days   Lab Units 10/12/24  1700 10/11/24  2339   POC GLUCOSE mg/dl 112 157*     Results from last 7 days   Lab Units 10/15/24  0436 10/14/24  2245 10/14/24  1735 10/14/24  0447 10/13/24  0606 10/12/24  0517 10/11/24  0505 10/11/24  0014 10/10/24  1806 10/10/24  1032 10/10/24  0408 10/09/24  2344   GLUCOSE RANDOM mg/dL 98 118 102 104 138 134 109 109 146* 93 164* 150*         Results from last 7 days   Lab Units 10/11/24  0204 10/10/24  1106 10/10/24  0928   PH SYLVESTER  7.449* 7.482* 7.169*   PCO2 SYLVESTER mm Hg 42.7 43.0 75.0*   PO2 SYLVESTER mm Hg 40.7 33.6* 55.5*   HCO3 SYLVESTER mmol/L 28.9 31.4* 26.7   BASE EXC SYLVESTER mmol/L 4.4 7.2 -3.7   O2 CONTENT SYLVESTER ml/dL 14.3 13.5 15.7   O2 HGB, VENOUS % 75.6 70.5 78.5               Results from last 7 days   Lab Units  10/12/24  0517 10/11/24  0505 10/10/24  1032   PROCALCITONIN ng/ml 0.38* 0.45* 0.81*     Results from last 7 days   Lab Units 10/10/24  1219 10/10/24  0939   LACTIC ACID mmol/L 1.5 2.3*     Results from last 7 days   Lab Units 10/11/24  0505   PROLACTIN ng/mL 56.51*           Network Utilization Review Department  ATTENTION: Please call with any questions or concerns to [phone] and carefully listen to the prompts so that you are directed to the right person. All voicemails are confidential.   For Discharge needs, contact Care Management DC Support Team at [phone] opt. [phone]  Send all requests for admission clinical reviews, approved or denied determinations and any other requests to dedicated fax number below belonging to the campus where the patient is receiving treatment. List of dedicated fax numbers for the Facilities:  FACILITY NAME UR FAX NUMBER   ADMISSION DENIALS (Administrative/Medical Necessity) [phone]   DISCHARGE SUPPORT TEAM (NETWORK) [phone]   PARENT CHILD HEALTH (Maternity/NICU/Pediatrics) [phone]   [facility] [phone]   [facility] [phone]   [facility] [phone]   [facility] [phone]   [facility] [phone]   [facility] [phone]   [facility] [phone]   [facility] [phone]   [facility] [phone]   [facility] [phone]   [facility] [phone]   [facility] [phone]

## 2024-10-15 NOTE — ASSESSMENT & PLAN NOTE
Pertinent palliative diagnoses include: Testicular cancer s.p left orchiectomy/chemo, Chronic respiratory failure s/p Trach/vent dependent/PEG tube placement, neuromuscular syndrome, hx of prior PE on Eliquis, Toxic metabolic encephalopathy, hx of cardiac arrest, anxiety, depression    Social support provided to family  Patient is supported by mother, sister  Supportive listening   Normalized experience of patient/family  Provided anxiety containment  Provided anticipatory guidance    Follow up  Palliative Care will continue to follow.  Please reach out to on-call provider via Epic Secure Chat  if questions or concerns arise.  Please do not hesitate to reach our on call provider through our clinic answering service at [phone] should you have acute symptom control concerns.

## 2024-10-15 NOTE — QUICK NOTE
Bradycardia and sinus arrest in the setting of mucous plugging and bearing down. Telemetry reviewed. No further episodes of significant bradycardia. No high degree AV block or pauses. Continued management of acute medical illness per critical care team. Cardiology will sign off at this time and be available as needed. Please call with questions or re consult as needed.

## 2024-10-15 NOTE — PROGRESS NOTES
Progress Note - Critical Care/ICU   Name: Hemanth Swanson 39 y.o. male I MRN: [id number]  Unit/Bed#: ICU 05 I Date of Admission: 10/5/2024   Date of Service: 10/15/2024 I Hospital Day: 10      Assessment & Plan  Bradycardia  Likely vagal response to coughing.  Spontaneously resolves without intervention.  Was initially started on dopamine with heart rate goal of 60.  Possibly related to autonomic insufficiency  Resolves with ativan.  Consider autonomic failure as underlying etiology of neuromuscular disorder leading to bradycardic and hypoxic episodes.    Plan:  Increase midodrine to 15 mg every 8  Continue  fludrocortisone 0.05 mg daily.  Will continue to monitor.  Sepsis ([facility])  [place] ED: febrile, tachycardic, with leukocytosis and a lactate of 12.  X-ray with left sided opacification, increased secretions from tracheostomy.  Bronc culture revealed Proteus and Pseudomonas    Plan:  Continue Zosyn  Chronic respiratory failure with hypoxia and hypercapnia (HCC)  Neuromuscular disease post chemotherapyS/P tracheostomy in 9/2023.  Vent dependent 24/7: current vent settings 14/500/6/60  Last bronchoscopy 6/1 with cultures growing serratia and pseudomonas.  Chest X-ray 10/5: Left sided opacification with possible mucus plugging.  CXR 10/7: Near complete opacification of the left hemithorax with leftward mediastinal shift, mildly improved from 10/5/2024, suggesting baseline underlying atelectasis and pleural effusion.   Chest x-ray 10/9 seems improved from prior.    Plan:  Continue vent support  Xopnex nebs TID  Resume hypertonic saline nebs  Continue mucomyst  Seizure-like activity (HCC)  Repeat episode of seizure like activity this morning.  No evidence of seizure on EEG.  Episodes relieved by ativan.  He received 100 mg IM biweekly testosterone with long-lasting formulation.  Can consider repeating testosterone levels after 1 week.  Toxic metabolic encephalopathy  Per PMR recommendations, consider TTE for refractory  hypotension.  Continue sedation with Seroquel 25 mg nightly and Ativan 2 mg every 4 as needed.  Also continue Oxy 5 mg Q6.  Can consider gabapentin 100 mg 3 times daily and titrate to 300 mg 3 times daily per PMR recommendations  Also consider MRI brain if able to perform for ongoing encephalopathy and weakness.  Continue supportive care.  Umbilical hernia without obstruction and without gangrene  Evaluated by surgery.  Found to not be a surgical candidate due to ongoing comorbidities.  Fat containing incarcerated hernia without bowel.  Palliative care by specialist  Palliative care is following.  They will continue to engage with family regarding goals of care.  Disposition: Critical care    ICU Core Measures     Vented Patient  VAP Bundle  VAP bundle ordered     A: Assess, Prevent, and Manage Pain Has pain been assessed? Yes  Need for changes to pain regimen? Yes   B: Both Spontaneous Awakening Trials (SATs) and Spontaneous Breathing Trials (SBTs) Plan to perform spontaneous awakening trial today? Yes   Plan to perform spontaneous breathing trial today? Yes   Obvious barriers to extubation? Yes   C: Choice of Sedation RASS Goal: 0 Alert and Calm  Need for changes to sedation or analgesia regimen? Yes   D: Delirium CAM-ICU: Negative   E: Early Mobility  Plan for early mobility? Yes   F: Family Engagement Plan for family engagement today? Yes       Antibiotic Review: Continue broad spectrum secondary to severity of illness.     Review of Invasive Devices:      Central access plan: Patient has multiple central venous catheters.       Prophylaxis:  VTE VTE covered by:  apixaban, Per PEG Tube, 5 mg at 10/15/24 0836       Stress Ulcer  not ordered         24 Hour Events :   Bradycardic and hypoxic episodes less frequent since starting benzodiazepine and nightly antipsychotic.  Ongoing goals of care discussion with family.  Seems like family is well equipped to help him at home.    Subjective   Review of Systems: See HPI  for Review of Systems    Objective :                   Vitals I/O      Most Recent Min/Max in 24hrs   Temp 97.5 °F (36.4 °C) Temp  Min: 95.2 °F (35.1 °C)  Max: 97.9 °F (36.6 °C)   Pulse 68 Pulse  Min: 54  Max: 90   Resp 18 Resp  Min: 18  Max: 18   BP 93/53 BP  Min: 83/51  Max: 156/93   O2 Sat 91 % SpO2  Min: 89 %  Max: 100 %      Intake/Output Summary (Last 24 hours) at 10/15/2024 0848  Last data filed at 10/15/2024 0611  Gross per 24 hour   Intake 3473 ml   Output 450 ml   Net 3023 ml       Diet Enteral/Parenteral; Tube Feeding No Oral Diet; Jevity 1.5; Continuous; 70; 200; Water; Every 4 hours    Invasive Monitoring           Physical Exam   Physical Exam  Vitals reviewed.   Eyes:      General: No scleral icterus.        Right eye: No discharge.         Left eye: No discharge.   Skin:     General: Skin is cool.      Capillary Refill: Capillary refill takes less than 2 seconds.   HENT:      Head: Normocephalic and atraumatic.      Mouth/Throat:      Mouth: Mucous membranes are moist.      Comments: Profuse saliva from mouth.  Cardiovascular:      Rate and Rhythm: Normal rate and regular rhythm.      Heart sounds: Normal heart sounds.   Musculoskeletal:      Right lower le+ Edema present.      Left lower le+ Edema present.   Abdominal:      Palpations: Abdomen is soft.      Tenderness: There is no abdominal tenderness.   Constitutional:       General: He is in acute distress.      Appearance: He is well-developed. He is ill-appearing, toxic-appearing and diaphoretic.      Interventions: He is sedated, intubated and restrained.   Pulmonary:      Effort: He is intubated.      Breath sounds: No wheezing, rhonchi or rales.   Neurological:      Mental Status: He is agitated.      Motor: Strength full and intact in all extremities.   Genitourinary/Anorectal:  Sheppard present.        Diagnostic Studies        Lab Results: I have reviewed the following results:     Medications:  Scheduled PRN   apixaban, 5 mg,  BID  calcium gluconate, 2 g, Once  chlorhexidine, 15 mL, Q12H ISAEL  fludrocortisone, 0.05 mg, Daily  levalbuterol, 1.25 mg, Q6H  midodrine, 15 mg, Q8H ISAEL  oxyCODONE, 5 mg, Q6H  piperacillin-tazobactam, 4.5 g, Q8H  polyethylene glycol, 17 g, Daily  potassium chloride, 20 mEq, Once  potassium-sodium phosphates, 2 packet, Once  QUEtiapine, 25 mg, HS  senna-docusate sodium, 2 tablet, BID  sodium chloride, 4 mL, Q6H      acetaminophen, 650 mg, Q6H PRN  bisacodyl, 10 mg, Daily PRN  LORazepam, 2 mg, Q4H PRN       Continuous            Labs:   CBC    Recent Labs     10/14/24  0447 10/15/24  0436   WBC 9.32 8.60   HGB 13.0 11.8*   HCT 40.8 36.7    188     BMP    Recent Labs     10/14/24  2245 10/15/24  0436   SODIUM 146 144   K 3.6 3.6    107   CO2 34* 33*   AGAP 5 4   BUN 15 16   CREATININE 0.69 0.70   CALCIUM 7.9* 7.8*       Coags    No recent results     Additional Electrolytes  Recent Labs     10/14/24  0447 10/14/24  1735 10/15/24  0436   MG  --  2.2 2.7   PHOS 2.6* 2.2* 1.8*   CAIONIZED 1.07*  --  1.08*          Blood Gas    No recent results  No recent results   LFTs  No recent results    Infectious  No recent results    Glucose  Recent Labs     10/14/24  0447 10/14/24  1735 10/14/24  2245 10/15/24  0436   GLUC 104 102 118 98

## 2024-10-15 NOTE — RESPIRATORY THERAPY NOTE
Assisted in bedside bronch. 1% lidocaine administered via neb prior to bronch. . No sodium chloride instilled . Per Dr Sams only wanted trap sent down no specific labs.

## 2024-10-15 NOTE — RESPIRATORY THERAPY NOTE
10/15/24 0727   Respiratory Assessment   Assessment Type During-treatment   General Appearance Eyes open/responds to pain   Respiratory Pattern Assisted   Chest Assessment Chest expansion symmetrical   Bilateral Breath Sounds Diminished   Cough None   Suction Oral;Trach   Resp Comments Attempted SBT . Pt went apneic. LOw RR and low VT. Placed back on fu;; settings   Vent Information   Vent    Vent type     Vent Mode AC/VC   $ Vital Capacity Mech/Peak Flow Yes   $ Pulse Oximetry Spot Check Charge Completed   SpO2 91 %   AC/VC Settings   Resp Rate (BPM) 18 BPM   Vt (mL) 600 mL   FIO2 (%) 40 %   PEEP (cmH2O) 10 cmH2O   Flow Pattern (LPM) 45 L/min   Trigger Sensitivity Flow (lpm) 3 %   Humidification Heater   Heater Temperature (Set) 98.6 °F (37 °C)   AC/VC Actuals   Resp Rate (BPM) 18 BPM   VT (mL) 612   MV 11   MAP (cmH2O) 17 cmH2O   Peak Pressure (cmH2O) 30 cmH2O   I/E Ratio (Obs) 1:1.3   Heater Temperature (Obs) 98.6 °F (37 °C)   Static Compliance (mL/cmH20) 64 mL/cmH2O   Plateau Pressure (cm H2O) 28 cm H2O   AC/VC Apnea Settings   Resp Rate (BPM) 18 BPM   VT (mL) 600 mL   FIO2 (%) 100 %   Apnea Time (s) 20 S   Apnea Flow (L/min) 45 L/min   Maintenance   Alarm (pink) cable attached Yes   Resuscitation bag with peep valve at bedside Yes   Water bag changed No   Circuit changed No   Daily Screen   Patient safety screen outcome: Failed   Not Ready for Weaning due to: Poor inspiratory effort;Underline problem not resolved   Spont breathing trial outcome: Failed   Spont breathing trial reason failed RR < 8 bpm;Tidal volume < 4ml/Kg of ideal body weight or VE <5 or  >15 LPM   Previous settings resumed Yes   Name of Medical Team Notified: AP Khalif   IHI Ventilator Associated Pneumonia Bundle   Daily Awakening Trials Performed Not applicable (Comment)   Daily Assessment of Readiness to Extubate Yes   Head of Bed Elevated HOB 30   Oral Care Mouth suctioned   Surgical Airway Distal;Cuffed;Other (Comment)    Placement Date/Time: 06/01/24 1155   Tube Size: 6  Placed By: Physician  Placed by (Name): Dr. Ronquillo  Type: Tracheostomy  Style: (c) Distal;Cuffed;Other (Comment)   Status Cuff Inflated   Site Assessment Dry   Ties Assessment Dry   Surgical Airway Cuff Pressure (color) Green   Equipment at bedside BVM;Wall Suction setup;Additional complete same size trach tube;Additional complete one size smaller trach tube;Sterile saline;Additional inner cannula        Patient

## 2024-10-15 NOTE — ASSESSMENT & PLAN NOTE
Per PMR recommendations, consider TTE for refractory hypotension.  Continue sedation with Seroquel 25 mg nightly and Ativan 2 mg every 4 as needed.  Also continue Oxy 5 mg Q6.  Can consider gabapentin 100 mg 3 times daily and titrate to 300 mg 3 times daily per PMR recommendations  Also consider MRI brain if able to perform for ongoing encephalopathy and weakness.  Continue supportive care.

## 2024-10-15 NOTE — ASSESSMENT & PLAN NOTE
Likely vagal response to coughing.  Spontaneously resolves without intervention.  Was initially started on dopamine with heart rate goal of 60.  Possibly related to autonomic insufficiency  Resolves with ativan.  Consider autonomic failure as underlying etiology of neuromuscular disorder leading to bradycardic and hypoxic episodes.    Plan:  Increase midodrine to 15 mg every 8  Continue  fludrocortisone 0.05 mg daily.  Will continue to monitor.

## 2024-10-15 NOTE — PROGRESS NOTES
Progress Note - Palliative Care   Name: Hemanth Swanson 39 y.o. male I MRN: [id number]  Unit/Bed#: ICU 05 I Date of Admission: 10/5/2024   Date of Service: 10/15/2024 I Hospital Day: 10    Assessment & Plan  Seizure-like activity (HCC)  Noted to have seizure-like activity in ICU on 10/09  EEG negative for seizures though symptoms persist  Neurology team consulted  Mgmt per primary team/neurology.   Chronic respiratory failure with hypoxia and hypercapnia (HCC)  Unexplained respiratory failure s/p PEG/Trach/vent dependent with concern of underlying neuromuscular disease  Hx of PE on Eliquis  Requiring 24/7 ventilator support.   Mgmt per primary team  Sepsis (HCC)  Fever, tachycardia, leukocytosis present on arrival to ED  CXR revealed left sided opacification. Bronchoscopic culture revealed proteus and pseudomonas  On Zosyn  Mgmt per primary team  Goals of care, counseling/discussion  Decisional apparatus:  Patient is not competent on exam today.  If competency is lost, patient's substitute decision maker would default to mother by [place] Act 169.  Advance Directive / Living Will / POLST / POA Forms: Has POA documents at home. Sister Dmitry Swanson is POA. She was asked to bring a copy into the hospital so that it can be reviewed and entered into EMR.    Dmitry states she gave a copy to staff today to be scanned into chart.    Goals  Level 1 code status.   Full code.   Disease focused care.   No limits placed.   Confirmed goals today with sister Dmitry  10/15/24  Spoke with sister Dmitry/CRISELDA who states patient wants all interventions to prolong survivability  Sister reports frequent discussions with patient regarding his QoL and goals for treatment; patient motivated by being around for his children and has hopes of recovery  He would still want life-prolonging cares even if there is no chance of recovery  His wish is to never be placed in a facility, short or long term and wants to remain at his sisters home. Sister,  grandmother and best friend are all home healthcare nurses who provided 24/7 support to patient at home  Plan to return home upon discharge.   Sister requesting help setting up PT/OT at home.  Family would like all the home support they can get and are interested in following with palliative care. They prefer home palliative care. Will place on hospital follow up list.    Palliative care by specialist  Pertinent palliative diagnoses include: Testicular cancer s.p left orchiectomy/chemo, Chronic respiratory failure s/p Trach/vent dependent/PEG tube placement, neuromuscular syndrome, hx of prior PE on Eliquis, Toxic metabolic encephalopathy, hx of cardiac arrest, anxiety, depression    Social support provided to family  Patient is supported by mother, sister  Supportive listening   Normalized experience of patient/family  Provided anxiety containment  Provided anticipatory guidance    Follow up  Palliative Care will continue to follow.  Please reach out to on-call provider via Epic Secure Chat  if questions or concerns arise.  Please do not hesitate to reach our on call provider through our clinic answering service at [phone] should you have acute symptom control concerns.        PDMP Review: I have reviewed the patient's controlled substance dispensing history in the Prescription Drug Monitoring Program in compliance with the [place] regulations before prescribing any controlled substances.    24 Hour Chart History:  Chart Reviewed prior to visit    Patient resting in bed. No family present.   NAD. Resting comfortably. Vent dependent.     Spoke with Dmitry via Telephone- she states she has concerns about him getting ativan during the day , as she reports he gets tired and wants to sleep. She confirmed that the goal is for him to have interventions that can prolong his life and for him to return home. She reports that she has brought in a copy of the POA paperwork to be scanned into her chart.  Discussed with ICU  team- ativan dose/timing changed.       Objective :  Temp:  [95.2 °F (35.1 °C)-97.9 °F (36.6 °C)] 96.4 °F (35.8 °C)  HR:  [54-90] 56  BP: ()/(48-94) 98/58  Resp:  [18] 18  SpO2:  [89 %-98 %] 93 %  O2 Device: Ventilator    Physical Exam  Vitals and nursing note reviewed.   Constitutional:       General: He is sleeping. He is not in acute distress.  HENT:      Head: Normocephalic and atraumatic.   Cardiovascular:      Rate and Rhythm: Bradycardia present.   Pulmonary:      Effort: Pulmonary effort is normal. No respiratory distress.      Comments: Vent dependent  Skin:     General: Skin is warm and dry.      Coloration: Skin is not jaundiced or pale.   Neurological:      Comments: Unable to assess   Psychiatric:      Comments: Unable to assess            Lab Results: I have reviewed the following results:  Lab Results   Component Value Date/Time    SODIUM 144 10/15/2024 04:36 AM    SODIUM 136 12/19/2023 12:04 PM    K 3.6 10/15/2024 04:36 AM    K 3.9 12/19/2023 12:04 PM    BUN 16 10/15/2024 04:36 AM    BUN 7 12/19/2023 12:04 PM    CREATININE 0.70 10/15/2024 04:36 AM    CREATININE 0.68 12/19/2023 12:04 PM    GLUC 98 10/15/2024 04:36 AM    GLUC 86 12/19/2023 12:04 PM    CALCIUM 7.8 (L) 10/15/2024 04:36 AM    CALCIUM 9.6 12/19/2023 12:04 PM    AST 12 (L) 10/08/2024 03:44 AM    AST 25 12/19/2023 12:04 PM    ALT 11 10/08/2024 03:44 AM    ALT 53 12/19/2023 12:04 PM    ALB 2.7 (L) 10/08/2024 03:44 AM    ALB 4.2 12/19/2023 12:04 PM    TP 5.2 (L) 10/08/2024 03:44 AM    TP 7.3 12/19/2023 12:04 PM    EGFR 120 10/15/2024 04:36 AM    EGFR 123 12/19/2023 12:04 PM     Lab Results   Component Value Date/Time    HGB 11.8 (L) 10/15/2024 04:36 AM    WBC 8.60 10/15/2024 04:36 AM     10/15/2024 04:36 AM    INR 1.46 (H) 10/08/2024 03:44 AM    PTT 31 10/08/2024 03:44 AM     Lab Results   Component Value Date/Time    [id number] 3.179 10/08/2024 07:46 AM         Administrative Statements   I have spent a total time of 35 minutes  in caring for this patient on the day of the visit/encounter including Risks and benefits of tx options, Patient and family education, Counseling / Coordination of care, Documenting in the medical record, Reviewing / ordering tests, medicine, procedures  , Obtaining or reviewing history  , and Communicating with other healthcare professionals . Topics discussed with the patient / family include symptom assessment and management, medication review, psychosocial support, goals of care, supportive listening, and anticipatory guidance.

## 2024-10-15 NOTE — ASSESSMENT & PLAN NOTE
[facility] ED: febrile, tachycardic, with leukocytosis and a lactate of 12.  X-ray with left sided opacification, increased secretions from tracheostomy.  Bronc culture revealed Proteus and Pseudomonas    Plan:  Continue Zosyn

## 2024-10-16 ENCOUNTER — APPOINTMENT (INPATIENT)
Dept: MRI IMAGING | Facility: HOSPITAL | Age: 39
DRG: 720 | End: 2024-10-16
Payer: COMMERCIAL

## 2024-10-16 LAB
ANION GAP SERPL CALCULATED.3IONS-SCNC: 6 MMOL/L (ref 4–13)
ANISOCYTOSIS BLD QL SMEAR: PRESENT
BASOPHILS # BLD MANUAL: 0 THOUSAND/UL (ref 0–0.1)
BASOPHILS NFR MAR MANUAL: 0 % (ref 0–1)
BUN SERPL-MCNC: 16 MG/DL (ref 5–25)
CA-I BLD-SCNC: 1.12 MMOL/L (ref 1.12–1.32)
CALCIUM SERPL-MCNC: 8 MG/DL (ref 8.4–10.2)
CHLORIDE SERPL-SCNC: 108 MMOL/L (ref 96–108)
CO2 SERPL-SCNC: 29 MMOL/L (ref 21–32)
CREAT SERPL-MCNC: 0.79 MG/DL (ref 0.6–1.3)
EOSINOPHIL # BLD MANUAL: 0.09 THOUSAND/UL (ref 0–0.4)
EOSINOPHIL NFR BLD MANUAL: 1 % (ref 0–6)
ERYTHROCYTE [DISTWIDTH] IN BLOOD BY AUTOMATED COUNT: 20 % (ref 11.6–15.1)
GFR SERPL CREATININE-BSD FRML MDRD: 114 ML/MIN/1.73SQ M
GLUCOSE SERPL-MCNC: 80 MG/DL (ref 65–140)
HCT VFR BLD AUTO: 38.5 % (ref 36.5–49.3)
HGB BLD-MCNC: 12.1 G/DL (ref 12–17)
LYMPHOCYTES # BLD AUTO: 2.61 THOUSAND/UL (ref 0.6–4.47)
LYMPHOCYTES # BLD AUTO: 28 % (ref 14–44)
MAGNESIUM SERPL-MCNC: 2.5 MG/DL (ref 1.9–2.7)
MCH RBC QN AUTO: 30.6 PG (ref 26.8–34.3)
MCHC RBC AUTO-ENTMCNC: 31.4 G/DL (ref 31.4–37.4)
MCV RBC AUTO: 98 FL (ref 82–98)
MONOCYTES # BLD AUTO: 0.28 THOUSAND/UL (ref 0–1.22)
MONOCYTES NFR BLD: 3 % (ref 4–12)
NEUTROPHILS # BLD MANUAL: 6.34 THOUSAND/UL (ref 1.85–7.62)
NEUTS BAND NFR BLD MANUAL: 1 % (ref 0–8)
NEUTS SEG NFR BLD AUTO: 67 % (ref 43–75)
NRBC BLD AUTO-RTO: 2 /100 WBC (ref 0–2)
PHOSPHATE SERPL-MCNC: 4 MG/DL (ref 2.7–4.5)
PLATELET # BLD AUTO: 229 THOUSANDS/UL (ref 149–390)
PLATELET BLD QL SMEAR: ADEQUATE
PMV BLD AUTO: 10.4 FL (ref 8.9–12.7)
POLYCHROMASIA BLD QL SMEAR: PRESENT
POTASSIUM SERPL-SCNC: 3.8 MMOL/L (ref 3.5–5.3)
RBC # BLD AUTO: 3.95 MILLION/UL (ref 3.88–5.62)
RBC MORPH BLD: PRESENT
SODIUM SERPL-SCNC: 143 MMOL/L (ref 135–147)
WBC # BLD AUTO: 9.33 THOUSAND/UL (ref 4.31–10.16)

## 2024-10-16 PROCEDURE — 80048 BASIC METABOLIC PNL TOTAL CA: CPT

## 2024-10-16 PROCEDURE — 83735 ASSAY OF MAGNESIUM: CPT

## 2024-10-16 PROCEDURE — 94640 AIRWAY INHALATION TREATMENT: CPT

## 2024-10-16 PROCEDURE — 94003 VENT MGMT INPAT SUBQ DAY: CPT

## 2024-10-16 PROCEDURE — 94760 N-INVAS EAR/PLS OXIMETRY 1: CPT

## 2024-10-16 PROCEDURE — 82330 ASSAY OF CALCIUM: CPT

## 2024-10-16 PROCEDURE — 85007 BL SMEAR W/DIFF WBC COUNT: CPT

## 2024-10-16 PROCEDURE — 84100 ASSAY OF PHOSPHORUS: CPT

## 2024-10-16 PROCEDURE — 70553 MRI BRAIN STEM W/O & W/DYE: CPT

## 2024-10-16 PROCEDURE — 99232 SBSQ HOSP IP/OBS MODERATE 35: CPT | Performed by: INTERNAL MEDICINE

## 2024-10-16 PROCEDURE — 85027 COMPLETE CBC AUTOMATED: CPT

## 2024-10-16 PROCEDURE — 99233 SBSQ HOSP IP/OBS HIGH 50: CPT

## 2024-10-16 PROCEDURE — 94150 VITAL CAPACITY TEST: CPT

## 2024-10-16 PROCEDURE — A9585 GADOBUTROL INJECTION: HCPCS | Performed by: FAMILY MEDICINE

## 2024-10-16 RX ORDER — OXYCODONE HYDROCHLORIDE 5 MG/1
5 TABLET ORAL EVERY 6 HOURS
Status: DISCONTINUED | OUTPATIENT
Start: 2024-10-16 | End: 2024-10-21

## 2024-10-16 RX ORDER — LORAZEPAM 2 MG/ML
2 INJECTION INTRAMUSCULAR ONCE
Status: COMPLETED | OUTPATIENT
Start: 2024-10-16 | End: 2024-10-16

## 2024-10-16 RX ORDER — MIDODRINE HYDROCHLORIDE 5 MG/1
15 TABLET ORAL EVERY 8 HOURS SCHEDULED
Status: DISCONTINUED | OUTPATIENT
Start: 2024-10-16 | End: 2024-10-20

## 2024-10-16 RX ORDER — MIDAZOLAM HYDROCHLORIDE 2 MG/2ML
2 INJECTION, SOLUTION INTRAMUSCULAR; INTRAVENOUS EVERY 4 HOURS PRN
Status: DISCONTINUED | OUTPATIENT
Start: 2024-10-16 | End: 2024-10-20

## 2024-10-16 RX ORDER — MIDAZOLAM HYDROCHLORIDE 2 MG/2ML
2 INJECTION, SOLUTION INTRAMUSCULAR; INTRAVENOUS ONCE
Status: COMPLETED | OUTPATIENT
Start: 2024-10-16 | End: 2024-10-16

## 2024-10-16 RX ORDER — MIDAZOLAM HYDROCHLORIDE 2 MG/2ML
INJECTION, SOLUTION INTRAMUSCULAR; INTRAVENOUS
Status: COMPLETED
Start: 2024-10-16 | End: 2024-10-16

## 2024-10-16 RX ORDER — AMOXICILLIN 250 MG
1 CAPSULE ORAL
Status: DISCONTINUED | OUTPATIENT
Start: 2024-10-17 | End: 2024-10-17

## 2024-10-16 RX ORDER — AMOXICILLIN 250 MG
1 CAPSULE ORAL 2 TIMES DAILY
Status: DISCONTINUED | OUTPATIENT
Start: 2024-10-16 | End: 2024-10-16

## 2024-10-16 RX ORDER — GADOBUTROL 604.72 MG/ML
9 INJECTION INTRAVENOUS
Status: COMPLETED | OUTPATIENT
Start: 2024-10-16 | End: 2024-10-16

## 2024-10-16 RX ADMIN — MIDODRINE HYDROCHLORIDE 15 MG: 5 TABLET ORAL at 04:36

## 2024-10-16 RX ADMIN — MIDAZOLAM HYDROCHLORIDE 2 MG: 1 INJECTION, SOLUTION INTRAMUSCULAR; INTRAVENOUS at 03:52

## 2024-10-16 RX ADMIN — APIXABAN 5 MG: 5 TABLET, FILM COATED ORAL at 08:20

## 2024-10-16 RX ADMIN — OXYCODONE HYDROCHLORIDE 5 MG: 5 TABLET ORAL at 21:30

## 2024-10-16 RX ADMIN — PIPERACILLIN SODIUM AND TAZOBACTAM SODIUM 4.5 G: 36; 4.5 INJECTION, POWDER, LYOPHILIZED, FOR SOLUTION INTRAVENOUS at 16:36

## 2024-10-16 RX ADMIN — MIDODRINE HYDROCHLORIDE 15 MG: 5 TABLET ORAL at 15:07

## 2024-10-16 RX ADMIN — APIXABAN 5 MG: 5 TABLET, FILM COATED ORAL at 17:38

## 2024-10-16 RX ADMIN — MIDAZOLAM 2 MG: 1 INJECTION INTRAMUSCULAR; INTRAVENOUS at 16:36

## 2024-10-16 RX ADMIN — MIDODRINE HYDROCHLORIDE 15 MG: 5 TABLET ORAL at 21:30

## 2024-10-16 RX ADMIN — MIDAZOLAM 2 MG: 1 INJECTION INTRAMUSCULAR; INTRAVENOUS at 03:52

## 2024-10-16 RX ADMIN — Medication 4 ML: at 08:12

## 2024-10-16 RX ADMIN — LEVALBUTEROL HYDROCHLORIDE 1.25 MG: 1.25 SOLUTION RESPIRATORY (INHALATION) at 13:09

## 2024-10-16 RX ADMIN — OXYCODONE HYDROCHLORIDE 5 MG: 5 TABLET ORAL at 10:57

## 2024-10-16 RX ADMIN — OXYCODONE HYDROCHLORIDE 5 MG: 5 TABLET ORAL at 15:07

## 2024-10-16 RX ADMIN — LORAZEPAM 2 MG: 2 INJECTION INTRAMUSCULAR; INTRAVENOUS at 15:18

## 2024-10-16 RX ADMIN — LEVALBUTEROL HYDROCHLORIDE 1.25 MG: 1.25 SOLUTION RESPIRATORY (INHALATION) at 19:50

## 2024-10-16 RX ADMIN — MIDAZOLAM HYDROCHLORIDE 2 MG: 1 INJECTION, SOLUTION INTRAMUSCULAR; INTRAVENOUS at 08:39

## 2024-10-16 RX ADMIN — QUETIAPINE FUMARATE 25 MG: 25 TABLET ORAL at 21:30

## 2024-10-16 RX ADMIN — PIPERACILLIN SODIUM AND TAZOBACTAM SODIUM 4.5 G: 36; 4.5 INJECTION, POWDER, LYOPHILIZED, FOR SOLUTION INTRAVENOUS at 07:29

## 2024-10-16 RX ADMIN — PIPERACILLIN SODIUM AND TAZOBACTAM SODIUM 4.5 G: 36; 4.5 INJECTION, POWDER, LYOPHILIZED, FOR SOLUTION INTRAVENOUS at 00:22

## 2024-10-16 RX ADMIN — CHLORHEXIDINE GLUCONATE 15 ML: 1.2 RINSE ORAL at 08:20

## 2024-10-16 RX ADMIN — MIDAZOLAM 2 MG: 1 INJECTION INTRAMUSCULAR; INTRAVENOUS at 07:50

## 2024-10-16 RX ADMIN — LEVALBUTEROL HYDROCHLORIDE 1.25 MG: 1.25 SOLUTION RESPIRATORY (INHALATION) at 02:23

## 2024-10-16 RX ADMIN — LORAZEPAM 2 MG: 2 INJECTION INTRAMUSCULAR; INTRAVENOUS at 05:56

## 2024-10-16 RX ADMIN — CHLORHEXIDINE GLUCONATE 15 ML: 1.2 RINSE ORAL at 21:30

## 2024-10-16 RX ADMIN — PIPERACILLIN SODIUM AND TAZOBACTAM SODIUM 4.5 G: 36; 4.5 INJECTION, POWDER, LYOPHILIZED, FOR SOLUTION INTRAVENOUS at 23:35

## 2024-10-16 RX ADMIN — Medication 4 ML: at 13:09

## 2024-10-16 RX ADMIN — MIDAZOLAM 2 MG: 1 INJECTION INTRAMUSCULAR; INTRAVENOUS at 12:25

## 2024-10-16 RX ADMIN — Medication 4 ML: at 02:23

## 2024-10-16 RX ADMIN — Medication 4 ML: at 19:50

## 2024-10-16 RX ADMIN — GADOBUTROL 9 ML: 604.72 INJECTION INTRAVENOUS at 16:59

## 2024-10-16 RX ADMIN — LEVALBUTEROL HYDROCHLORIDE 1.25 MG: 1.25 SOLUTION RESPIRATORY (INHALATION) at 08:12

## 2024-10-16 NOTE — ASSESSMENT & PLAN NOTE
No evidence of seizure on EEG.  Episodes relieved by ativan.  He received 100 mg IM biweekly testosterone with long-lasting formulation.  Can consider repeating testosterone levels after 1 week.

## 2024-10-16 NOTE — PROGRESS NOTES
Physical Medicine and Rehabilitation Progress Note  Hemanth Swanson 39 y.o. male MRN: [id number]  Unit/Bed#: ICU 05 Encounter: [id number]      Chief Complaints:  Respiratory failure     Interval Events/Subjective:     - Significant episode of L lung mucous plugging requiring bronch suctioning 10/15  - Remains on Zosyn; no leukocytosis; labs stable  - pt afebrile last few days; BP variable; HR largely wnl with a few spikes;    TTE normal EF, diastolic function, R ventricle moderately dilated and new compared with 6/2024 study.    Florinef d/c'd; remains on midodrine 15mg TID; Remains on Oxy 5mg Q6H ATC; seroquel 25mg HS  10/16 - Ativan 2mg IV x2; Versed 2mg x4 for episodes of agitation/distress and need for sedation c/  - Plan for MRI brain  today  - Discussion with CCM attending and concern for ongoing refractory respiratory failure and likely neuromuscular weakness significantly impacting resp muscles/diaphragm; given this degree of weakness, lack of available effective treatments, he is at risk for ongoing difficulty clearing secretions, mucous plugging, PNA, and respiratory collapse even with optimal ventilation.      Lying in bed ill-appearing, ventilated, sleeping.  Patient received sedation recently and for upcoming MRI and will defer full exam at this time.  Per discussion with staff, he has been moving all limbs at times but minimally and remains confused unable to communicate purposefully.      Assessment & Plan:     39-year-old male with a past medical history of metastatic left testicular seminoma, status post left orchiectomy, chemotherapy, hypertension, anxiety, bipolar disorder, who developed hearing loss possible left-sided vestibular schwannoma, neuromuscular weakness, diplopia, bowel bladder incontinence following chemotherapy which was stopped.  Patient had extended hospital stay in August to September 2023 for this which also included respiratory failure requiring trach and PEG pulmonary embolism  "for which patient continues chronic trach/PEG status.  Patient also had hospitalization 5/2024 for multilobar PNA, hypoxic bradycardia felt 2/2 vasal vagal stimulation increased secretions and position of trach with mucous plugging c/b cardiac arrest with pulseless asystole without hypoxia, seizure like actiity felt to not be seizure activity helped apparently with managing secretions and keeping O2 above 90%.  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  There was no no electrodiagnostic evidence to support a diffuse neurogenic process such as motor neuron disease or a myopathic process affecting the peripheral nervous system.  Last OP neuro note 7/2024, \"given the lack of an obvious cause of his respiratory failure it is reasonable to consider neuromuscular disease. However, there is no evidence that this is the case. Specifically, there is nothing to suggest primary muscle disease, primary nerve disease, or neuromuscular junction disorders.\"      Of note,  patient was eating and drinking orally and they would not provide PEG tube feeds if eating or drinking adequately.  Patient would not drink plain water and drank mostly flavored drinks and sometimes soda per family.      Per family he had slow decline in function but overall was stable last few months at home with them managing him with trach/peg status.  He did have some hot flashes and sweating at home and was seen by urology late Aug and was started on IM testosterone biweekly for over a month.  He was noted to have increased agitation/restlessness for a week or so (per family he did not have these prior) and then significant more agitation " and anger and was recommended to hold testosterone with last dose 9/30.         On 10/5, patient apparently pulled out PEG during bout of agitation and presented to hospital where G tube was replaced but he was noted to be hypoxic with copious secretions and febrile.  He required adjustments to ventilation.  He was found to have complete L sided consolidation along with severe hypernatremia 176.  Patient was treated for PNA and sepsis with Abx.  Patient bronch culture + Proteus and pseudomonas and remains on Zosyn.  Leukocytosis improved 10/14.  Patient received IVF and nephro consulted for hypernatremia which was felt to be 2/2 dehydration which has been improving.  He had episode of rhythmic body jerking for several minutes on 10/10 and additional shaking episodes with hypoxia and bradycardia with hypotension requiring pressors.  He was placed on vEEG which captures some of these shaking spells which did not correlate with EEG seizures.  Neuro felt spells may be due to vagal response and possibly convulsive syncope in setting of hypoxia/bradycardia with possible functional component.  They did not feel he needed AED.       Significant episode of L lung mucous plugging requiring bronch suctioning 10/15; TTE 10/15 normal EF, diastolic function, R ventricle moderately dilated and new compared with 6/2024 study.   Florinef d/c'd; Remains on midodrine 15mg TID; remains on Oxy 5mg Q6H ATC; seroquel 25mg HS.  Required multiple doses of benzo on 10/16 for episodes of agitation/distress and need for sedation with planned MRI brain.    Discussion with CCM attending and concern for ongoing refractory respiratory failure and likely neuromuscular weakness significantly impacting resp muscles/diaphragm; given this degree of weakness, lack of available effective treatments, he is at risk for ongoing difficulty clearing secretions, mucous plugging, PNA, and respiratory collapse even with optimal ventilation.      Unfortunately,  patient's medical and functional prognosis at this time is extremely poor.       Prior Level of Function and Social history:    Patient lives at home with family - sister Dmitry and Aunt Magaly who provide caregiving.  He was able to move arms and legs but was weak throughout.  He was able to type on computer and phone and follow simple commands and voice words.  Patient was eating and drinking orally and they would not provide PEG tube feeds if eating or drinking adequately.  Patient would not drink plain water and drank mostly flavored drinks and sometimes soda.  He could help some with dressing.  He is chronically incontinent of b/b but would notifiy CG's/family when he went.  Overall though was dependent with ADLs and mobility; WVC, Wallace lift, Electric recliner; In past he could get to commode but not last several months.       Current Level of Function:    Dependent     Acute on Chronic respiratory failure with hypoxia  Neuromuscular weakness  Sepsis  AMS/encephalopathy  Diaphoresis   Hypotension  Bradycardia  Elevated Testosterone; hx of hypogonadism   Metastatic left testicular seminoma, status post left orchiectomy, chemotherapy  Trach dependent  PEG status  Hx of PE  Pneumonia  Hypernatremia  Seizure like episodes  Bowel and bladder dysfunction   Agitation, restlessness, insomnia, hx of anxiety and bipolar  Encounter for skin care     Dispo rec:  TBD - patient critically ill at this time; family would like patient to be stabilized and come home but currently not stable enough for safe discharge home and is at increased risk of mortality; close follow-up with palliative/CCM and family appropriate      Acute on Chronic respiratory failure with hypoxia on chronic vent, Sepsis, PNA  - 10/15 Significant episode of L lung mucous plugging requiring bronch suctioning 10/15;   - Concern for ongoing refractory respiratory failure and likely neuromuscular weakness significantly impacting resp muscles/diaphragm; given  "this degree of weakness, lack of available effective treatments, he is at risk for ongoing difficulty clearing secretions, mucous plugging, PNA, and respiratory collapse even with optimal ventilation  - TTE 10/15 normal EF, diastolic function, R ventricle moderately dilated and new compared with 6/2024 study  - 10/5 P/w complete opacification of L hemithorax, hypoxic with copious secretions, fever following increasing bouts of agitation and pulling out peg in context of recent initiation of IM testosterone (unclear relation)  - On Zosyn for PNA/sepsis (IV abx since 10/5) > remains without leukocytosis, BP somewhat low at times but variable   - he had last available swallow  studies in 10 and 11/2023 and were unremarkable and family has been providing oral feeds/oral fluids mostly at home   - 5/2024 hospitalization for multilobar PNA, hypoxic bradycardia felt 2/2 vasal vagal stimulation increased secretions and position of trach with mucous plugging c/b cardiac arrest with pulseless asystole without hypoxia, seizure like actiity felt to not be seizure activity helped apparently with managing secretions and keeping O2 above 90%  - Ensure appropriate mgmt of secretions and trach positioning   - Currently lethargic and NPO status/TF only - Ensure SLP eval and MBS study prior to any future considerations for oral intake   - 10/14 Having intermittent bouts of increased PIP - sometime corresponding to diaphoresis and possibly \"fighting vent\"/anxiety, agitation per discussion with staff; had elevated PIP/low VT improved with suction of thick yellow mucous 10/14; did receive IV ativan with brief improvement earlier in day; oxy 5 x1 with little improvement   - Repeat CXR 10/14 pending; prior CXRs showing L basilar effusion and atelectasis    - CT C 10/11 - Moderate bibasilar pulmonary opacification consistent with atelectasis; superimposed pneumonia not entirely excludable. Trace left greater than right pleural effusions.   - " Last CTPE study 5/30 negative for PE   - Overall sedation per [facility]              -Seroquel 25mg HS   - Oxy 5mg Q6H scheduled > reasonable to try but care with hx of hypotension and AMS > if going home ideally would be weaned off if possible   - Midazolam 2mg Q4H PRN > reasonable to try but care with hx of hypotension and AMS > if going home ideally would be weaned off if possible              - Consider gabapentin 100mg with quick titration to 300mg TID if tolerated - limited other options and may at some point help wean from opiates/benzo's   - Treated recently for PNA, possible atelectasis - Zosyn - WBC improved 10/14  - On multiple agents for BP support - (levophed stopped recently), Midodrine, florinef, (hydrocortisone now stopped)   - Chronic resp failure etiology uncertain - neuro work-up inconclusive   - Overall mgmt and sedation per [facility] - fentanyl stopped 10/12, receiving only Seroquel 25mg HS, 2mg Ativan Q4H PRN 2 doses 10/13 and 1 dose 10/14 sofar   - Monitor vent values, other vitals, labs, lung exam, overall exam, secretions closely  - Suction PRN when indicated  - Monitor for ventilator associated complications     Neuromuscular weakness, AMS/encephalopathy   - High risk of unexplained neuromuscular d/o driving resp failure at this point - without significant effective treatments   - Patient's weakness throughout worse than baseline and is more confused from baseline   - risk of encephalopathy or other cause as well as progression of underlying condition of uncertain etiology  - MRI Brain with and without contrast pending   - Requires 24-7 caregivers and dependent with ADLs and mobility   - flaccid tone overall, generalized weakness, could track and move all extremities some to command but not consistently   - Monitor neuro exam and cosider  - While patient at current functional level would also focus on prevention or improvement of complications (malnutrition, dehydration, PNA, atelectasis, VTE,  "constipation/obstruction, urinary retention, UTI, ulcerations, contractures).  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  There was no no electrodiagnostic evidence to support a diffuse neurogenic process such as motor neuron disease or a myopathic process affecting the peripheral nervous system.  Last OP neuro note 7/2024, \"given the lack of an obvious cause of his respiratory failure it is reasonable to consider neuromuscular disease. However, there is no evidence that this is the case. Specifically, there is nothing to suggest primary muscle disease, primary nerve disease, or neuromuscular junction disorders.\"      Hypotension, bradycardia   - Bp on lower side at time still but variable; improved bradycardia  - Prior hospitalization 5/2024 for multilobar PNA \"noted hypoxic bradycardia felt 2/2 vasal vagal stimulation increased secretions and position of trach with mucous plugging\"  - Ensure appropriate mgmt of secretions and trach positioning   - Only on midodrine 15mg TID now - was not on at home  (Was on multiple agents for BP support recently - (levophed stopped first, than hydrocortisone and most recently florinef; ensure adequate hydration (BUN/Cr appropriate, sodium improved)- Flaccid tone mostly, did have some dilated reactive pupils during episode of diaphoresis and resp " distress on vent - presentation and hx not c/w traditional autonomic storming  - Ensure mgmt of infection or other causes    - TTE 10/15 normal EF, diastolic function, R ventricle moderately dilated and new compared with 6/2024 study  - On Zosyn for PNA/sepsis (IV abx since 10/5) > remains without leukocytosis and afebrile  - Fluids per CCM - 200 cc free water Q4H ; TFs     Elevated Testosterone (Endocrinopathy); hx of hypogonadism   - Patient received T Cypionate 100mg IM biweekly for over a month with last dose 9/30 for hypogonadism earlier this month (T level prior 19) - this is long lasting T formulation but now over 2 weeks since last dose  - 10/11 FT >50, Total T >1500 (Max) - still fairly high level for 11 days later (half life 8-12 d but can stay in body for several weeks gradually decreasing  - Gynecomastia  (can be conversion of T to E, steroids) - can lead to MI, CVA, heart failure, polycythemia, mood swings, agitation, anxiety, depression, tendon injury, sleep apnea, DM, Wt gain, cog decline  - Query if high T level cause bouts of agitation/aggression recently at home which is possible based on hx from family   - Mildly elevated prolactin   - FSH, LH both low; AFP, hCG negative  - He did have some hot flashes and sweating at home and was seen by urology late Aug and was started on IM testosterone biweekly for over a month.  He was noted to have increased agitation/restlessness for a week or so (per family he did not have these prior) and then significant more agitation and anger and was recommended to hold testosterone with last dose 9/30.      - Consider Endocrinology consult     Metastatic left testicular seminoma, status post left orchiectomy, chemotherapy  - Work-up and mgmt per CCM/H-O  - AFP, hCG negative  - CT C/A/P without suspicious findings      PEG status  - Monitor site, bowel function  - TF's per primary      Hypernatremia  - Remains improved felt to be lack of free water/dehydration per  "nephro; treated sepsis/PNA on admission  - Per family, patient was eating and drinking orally and they would not provide PEG tube feeds if eating or drinking adequately.  Patient would not drink plain water and drank mostly flavored drinks and sometimes soda per family.   - Fluid boluses and Tfs per CCM/nutrition   - Ensure caregiver education on appropriate intake/flushes and monitoring after d/c   - Monitor  - On 200 ml fluid boluses Q4H which is more than last nutrition note 10/7 > which would give  Close to 2500 ml water/d if getting full Tfs and flushes with meds;   - \"continuous tube feeds of Jevity 1.5 @ 70 ml/hr. Water flushes of 150 ml q 4 hrs. Start at 20 ml/hr, advance by 10 ml q 4 hrs as tolerated until goal rate is reached. Defer additional water flush adjustments to critical care d/t hypernatremia.At goal EN regimen provides 2520 kcals, 107 g protein, and 2177 ml total water from formula + flushes;      Seizure like episodes  - Neuro felt spells may be due to vagal response and possibly convulsive syncope in setting of hypoxia/bradycardia with possible functional component.  They did not feel he needed AED.    - episode of rhythmic body jerking for several minutes on 10/10 and additional shaking episodes with hypoxia and bradycardia with hypotension requiring pressors.  He was placed on vEEG which captures some of these shaking spells which did not correlate with EEG seizure  -5/2024 hospitalization - also had seizure like actiity felt to not be seizure activity helped apparently with \"managing secretions and keeping O2 above 90%\"  - On PRN Versed, ATC oxy  - Consider gabapentin 100-300mg TID - limited other options     Hx of PE  - On Eliquis; Last CTPE 5/2024 negative      Bowel and bladder dysfunction  - has been chronically incontinent since after chemo per family; he usually is able to tell them when he is incontinent and they have been able to maintain hygiene   - Sheppard in place currently  - " Incontinent stool 10/14  - Ensure adequate hygiene  - Monitor for diarrhea/constipation      Agitation, restlessness, insomnia, hx of anxiety and bipolar  (Recently administered T IM with worsening symptoms at home)  - Seroquel 25mg HS  - On PRN Versed, ATC oxy  - Consider gabapentin 100mg TID and uptitrate to 300mg if appropriate      Encounter for skin care  - Increased risk of skin wounds/breakdown/rashes due to recent immobility and co-morbidities   - Turn patient Q2H in bed (use pillows or wedges)  - Patient and if available caregiver education   - Hydragaurd to buttocks and sacrum BID & PRN  - Allevyn foam to heels and float heels when in bed   - Optimal bowel/bladder hygiene; keep skin clean and dry   - EHOB waffle cushion to chair/WC when OOB  - Nursing to document in chart and Notify MD if buttock, sacrum, heel, or other skin site develops erythema, skin breakdown, or rashes as soon as possible.  If patient is soiling themselves with urine or stool notify MD.  If you are unable to maintain skin integrity and prevent erythema due to frequency of soiling notify MD as soon as possible as well.      VTE prophylaxis   As outlined by primary team      Other medical issues:     Per primary service (and relevant consultants if applicable)    Objective:    Allergies per EMR    Physical Exam:  Temp:  [96.8 °F (36 °C)-99 °F (37.2 °C)] 98.2 °F (36.8 °C)  HR:  [] 93  Resp:  [18] 18  BP: ()/() 135/87  SpO2:  [94 %-100 %] 98 %    Lying in bed ill-appearing, ventilated, sleeping.  Patient received sedation recently and for upcoming MRI and will defer full exam at this time.  Per discussion with staff, he has been moving all limbs at times but minimally and remains confused unable to communicate purposefully.      Diagnostic Studies: Reviewed  XR chest portable ICU   Final Result by Jewel Burton MD (10/15 2057)      Opacification at both lung bases is unchanged likely due to atelectasis.  Infiltrates and pleural effusions not excluded.            Workstation performed: [id number]         CT chest abdomen pelvis w contrast   Final Result by Philippe Coates MD (10/11 1426)         1. Moderate bibasilar pulmonary opacification consistent with atelectasis; superimposed pneumonia not entirely excludable. Trace left greater than right pleural effusions.   2. No acute abnormality identified in the abdomen or pelvis.   3. Additional findings as noted.               Workstation performed: [id number]         XR chest portable ICU   Final Result by Brendon Snyder MD (10/11 0356)      Left basilar effusion and atelectasis redemonstrated. Milder right basilar effusion/subsegmental atelectasis, also similar.         Workstation performed: [id number]         CT head wo contrast   Final Result by Andi Mccloud MD (10/09 2001)      No acute intracranial abnormality.                  Workstation performed: [id number]         XR chest portable ICU   Final Result by Andrea Bai MD (10/09 0823)      Left basilar effusion and atelectasis redemonstrated. Milder right basilar effusion/subsegmental atelectasis, also similar.            Workstation performed: [id number]         XR chest portable ICU   Final Result by Carlos Atkinson MD (10/07 1303)      Near complete opacification of the left hemithorax with leftward mediastinal shift, mildly improved from 10/5/2024, suggesting baseline underlying atelectasis and pleural effusion.      Note, left lung pneumonia cannot be excluded.      No pneumothorax.            Resident: SONAM KNOX I, the attending radiologist, have reviewed the images and agree with the final report above.      Workstation performed: [id number]         MRI brain w wo contrast    (Results Pending)       Laboratory: Labs reviewed  Results from last 7 days   Lab Units 10/16/24  0433 10/15/24  0436 10/14/24  0447   HEMOGLOBIN g/dL 12.1 11.8* 13.0   HEMATOCRIT %  38.5 36.7 40.8   WBC Thousand/uL 9.33 8.60 9.32     Results from last 7 days   Lab Units 10/16/24  0433 10/15/24  1639 10/15/24  0436   BUN mg/dL 16 16 16   SODIUM mmol/L 143 143 144   POTASSIUM mmol/L 3.8 3.9 3.6   CHLORIDE mmol/L 108 109* 107   CREATININE mg/dL 0.79 0.73 0.70            Drug regimen reviewed, all potential adverse effects identified and addressed:    Scheduled Meds:  Current Facility-Administered Medications   Medication Dose Route Frequency Provider Last Rate    acetaminophen  650 mg Per PEG Tube Q6H PRN Ne Low MD      apixaban  5 mg Per PEG Tube BID Mohsen Moon MD      bisacodyl  10 mg Rectal Daily PRN Ne Low MD      chlorhexidine  15 mL Mouth/Throat Q12H [facility] Mohsen Moon MD      levalbuterol  1.25 mg Nebulization Q6H Silvio Alas MD      midazolam  2 mg Intravenous Q4H PRN Bhupinder Angela MD      midodrine  15 mg Per PEG Tube Q8H [facility] El Sams MD      oxyCODONE  5 mg Per PEG Tube Q6H El Sams MD      piperacillin-tazobactam  4.5 g Intravenous Q8H Hermelindo Cuadra PA-C Stopped (10/16/24 1359)    QUEtiapine  25 mg Oral HS Dominik Raman MD      [START ON 10/17/2024] senna-docusate sodium  1 tablet Per PEG Tube HS Ne Low MD      sodium chloride  4 mL Nebulization Q6H John Sutherland MD         ** Please Note: Fluency Direct voice to text software may have been used in the creation of this document. **    Thank you for allowing the PM&R service to participate in the care of this patient. We will continue to follow. Please do not hesitate to call with questions or concerns.     Kilo Jennings MD, MS  [facility]  Physical Medicine and Rehabilitation  Brain Injury Medicine

## 2024-10-16 NOTE — ASSESSMENT & PLAN NOTE
Neuromuscular disease post chemotherapyS/P tracheostomy in 9/2023.  Repeat bronc yesterday showed mucous plugging of left lung.  This was suctioned during procedure.  Overall x-rays seem to be improving.  However due to his neuromuscular disease he likely has some component of diaphragmatic weakness.  Continues to fail SBT.    Plan:  Continue vent support  Xopnex and hypertonic saline nebs 3 times daily

## 2024-10-16 NOTE — CASE MANAGEMENT
Case Management Progress Note    Patient name Hemanth Swanson  Location ICU 05/ICU 05 MRN [id number]  : 1985 Date 10/16/2024       LOS (days): 11  Geometric Mean LOS (GMLOS) (days):   Days to GMLOS:        OBJECTIVE:        Current admission status: Inpatient  Preferred Pharmacy:   [facility]/pharm[facility] #[id number] - [facility] [address] [address]  Phone: [phone] Fax: [phone]    Primary Care Provider: Ela Douglas MD    Primary Insurance: [facility]  Secondary Insurance:     PROGRESS NOTE:    CM notified by CC resident that VENT changes were made. New settings will be what pt will d/c on.     CM reached out to Hilario - RT with [employer]. Script provided for changes on VENT. CM provided to CC team and requested return once completed.

## 2024-10-16 NOTE — PLAN OF CARE
Problem: Prexisting or High Potential for Compromised Skin Integrity  Goal: Skin integrity is maintained or improved  Description: INTERVENTIONS:  - Identify patients at risk for skin breakdown  - Assess and monitor skin integrity  - Assess and monitor nutrition and hydration status  - Monitor labs   - Assess for incontinence   - Turn and reposition patient  - Assist with mobility/ambulation  - Relieve pressure over bony prominences  - Avoid friction and shearing  - Provide appropriate hygiene as needed including keeping skin clean and dry  - Evaluate need for skin moisturizer/barrier cream  - Collaborate with interdisciplinary team   - Patient/family teaching  - Consider wound care consult   Outcome: Progressing     Problem: PAIN - ADULT  Goal: Verbalizes/displays adequate comfort level or baseline comfort level  Description: Interventions:  - Encourage patient to monitor pain and request assistance  - Assess pain using appropriate pain scale  - Administer analgesics based on type and severity of pain and evaluate response  - Implement non-pharmacological measures as appropriate and evaluate response  - Consider cultural and social influences on pain and pain management  - Notify physician/advanced practitioner if interventions unsuccessful or patient reports new pain  Outcome: Progressing     Problem: INFECTION - ADULT  Goal: Absence or prevention of progression during hospitalization  Description: INTERVENTIONS:  - Assess and monitor for signs and symptoms of infection  - Monitor lab/diagnostic results  - Monitor all insertion sites, i.e. indwelling lines, tubes, and drains  - Monitor endotracheal if appropriate and nasal secretions for changes in amount and color  - [place] appropriate cooling/warming therapies per order  - Administer medications as ordered  - Instruct and encourage patient and family to use good hand hygiene technique  - Identify and instruct in appropriate isolation precautions for  identified infection/condition  Outcome: Progressing  Goal: Absence of fever/infection during neutropenic period  Description: INTERVENTIONS:  - Monitor WBC    Outcome: Progressing     Problem: SAFETY ADULT  Goal: Patient will remain free of falls  Description: INTERVENTIONS:  - Educate patient/family on patient safety including physical limitations  - Instruct patient to call for assistance with activity   - Consult OT/PT to assist with strengthening/mobility   - Keep Call bell within reach  - Keep bed low and locked with side rails adjusted as appropriate  - Keep care items and personal belongings within reach  - Initiate and maintain comfort rounds  - Make Fall Risk Sign visible to staff  - Initiate/Maintain bed alarm      Outcome: Progressing  Goal: Maintain or return to baseline ADL function  Description: INTERVENTIONS:  -  Assess patient's ability to carry out ADLs; assess patient's baseline for ADL function and identify physical deficits which impact ability to perform ADLs (bathing, care of mouth/teeth, toileting, grooming, dressing, etc.)  - Assess/evaluate cause of self-care deficits   - Assess range of motion  - Assess patient's mobility; develop plan if impaired  - Assess patient's need for assistive devices and provide as appropriate  - Encourage maximum independence but intervene and supervise when necessary  - Involve family in performance of ADLs  - Assess for home care needs following discharge   - Consider OT consult to assist with ADL evaluation and planning for discharge  - Provide patient education as appropriate  Outcome: Progressing  Goal: Maintains/Returns to pre admission functional level  Description: INTERVENTIONS:  - Perform AM-PAC 6 Click Basic Mobility/ Daily Activity assessment daily.  - Set and communicate daily mobility goal to care team and patient/family/caregiver.   - Collaborate with rehabilitation services on mobility goals if consulted  - Perform Range of Motion 4 times a  day.  - Reposition patient every 2 hours.  - Record patient progress and toleration of activity level   Outcome: Progressing

## 2024-10-16 NOTE — PLAN OF CARE
Problem: Prexisting or High Potential for Compromised Skin Integrity  Goal: Skin integrity is maintained or improved  Description: INTERVENTIONS:  - Identify patients at risk for skin breakdown  - Assess and monitor skin integrity  - Assess and monitor nutrition and hydration status  - Monitor labs   - Assess for incontinence   - Turn and reposition patient  - Assist with mobility/ambulation  - Relieve pressure over bony prominences  - Avoid friction and shearing  - Provide appropriate hygiene as needed including keeping skin clean and dry  - Evaluate need for skin moisturizer/barrier cream  - Collaborate with interdisciplinary team   - Patient/family teaching  - Consider wound care consult   Outcome: Progressing     Problem: PAIN - ADULT  Goal: Verbalizes/displays adequate comfort level or baseline comfort level  Description: Interventions:  - Encourage patient to monitor pain and request assistance  - Assess pain using appropriate pain scale  - Administer analgesics based on type and severity of pain and evaluate response  - Implement non-pharmacological measures as appropriate and evaluate response  - Consider cultural and social influences on pain and pain management  - Notify physician/advanced practitioner if interventions unsuccessful or patient reports new pain  Outcome: Progressing     Problem: INFECTION - ADULT  Goal: Absence or prevention of progression during hospitalization  Description: INTERVENTIONS:  - Assess and monitor for signs and symptoms of infection  - Monitor lab/diagnostic results  - Monitor all insertion sites, i.e. indwelling lines, tubes, and drains  - Monitor endotracheal if appropriate and nasal secretions for changes in amount and color  - [place] appropriate cooling/warming therapies per order  - Administer medications as ordered  - Instruct and encourage patient and family to use good hand hygiene technique  - Identify and instruct in appropriate isolation precautions for  identified infection/condition  Outcome: Progressing  Goal: Absence of fever/infection during neutropenic period  Description: INTERVENTIONS:  - Monitor WBC    Outcome: Progressing     Problem: Knowledge Deficit  Goal: Patient/family/caregiver demonstrates understanding of disease process, treatment plan, medications, and discharge instructions  Description: Complete learning assessment and assess knowledge base.  Interventions:  - Provide teaching at level of understanding  - Provide teaching via preferred learning methods  Outcome: Progressing     Problem: Nutrition/Hydration-ADULT  Goal: Nutrient/Hydration intake appropriate for improving, restoring or maintaining nutritional needs  Description: Monitor and assess patient's nutrition/hydration status for malnutrition. Collaborate with interdisciplinary team and initiate plan and interventions as ordered.  Monitor patient's weight and dietary intake as ordered or per policy. Utilize nutrition screening tool and intervene as necessary. Determine patient's food preferences and provide high-protein, high-caloric foods as appropriate.     INTERVENTIONS:  - Monitor oral intake, urinary output, labs, and treatment plans  - Assess nutrition and hydration status and recommend course of action  - Evaluate amount of meals eaten  - Assist patient with eating if necessary   - Allow adequate time for meals  - Recommend/ encourage appropriate diets, oral nutritional supplements, and vitamin/mineral supplements  - Order, calculate, and assess calorie counts as needed  - Recommend, monitor, and adjust tube feedings and TPN/PPN based on assessed needs  - Assess need for intravenous fluids  - Provide specific nutrition/hydration education as appropriate  - Include patient/family/caregiver in decisions related to nutrition  Outcome: Progressing

## 2024-10-16 NOTE — ASSESSMENT & PLAN NOTE
Previous bronchoscopy had shown growth of Pseudomonas and Serratia was started on Zosyn. We will continue Zosyn for total of 10-day course.  Continue for 2 more days.

## 2024-10-16 NOTE — ASSESSMENT & PLAN NOTE
Bradycardia episode seems to be resolved.  He has been maintaining heart rates above 60 since medication adjustments.  He is currently receiving Oxy 5 milligrams during the day and Seroquel every evening. He was previously receiving Ativan during the day with improvement however family thinks that he would be too sedated during the day and requested changing this to nightly only.

## 2024-10-16 NOTE — PROGRESS NOTES
Progress Note - Critical Care/ICU   Name: Hemanth Swanson 39 y.o. male I MRN: [id number]  Unit/Bed#: ICU 05 I Date of Admission: 10/5/2024   Date of Service: 10/16/2024 I Hospital Day: 11      Assessment & Plan  Toxic metabolic encephalopathy  Per PMR recommendations, consider TTE for refractory hypotension.  Continue sedation with Seroquel 25 mg nightly and Ativan 2 mg every 4 as needed.  Also continue Oxy 5 mg Q6.  Can consider gabapentin 100 mg 3 times daily and titrate to 300 mg 3 times daily per PMR recommendations  Also consider MRI brain if able to perform for ongoing encephalopathy and weakness.  Continue supportive care.  Chronic respiratory failure with hypoxia and hypercapnia (HCC)  Neuromuscular disease post chemotherapyS/P tracheostomy in 9/2023.  Repeat bronc yesterday showed mucous plugging of left lung.  This was suctioned during procedure.  Overall x-rays seem to be improving.  However due to his neuromuscular disease he likely has some component of diaphragmatic weakness.  Continues to fail SBT.    Plan:  Continue vent support  Xopnex and hypertonic saline nebs 3 times daily  Bradycardia  Bradycardia episode seems to be resolved.  He has been maintaining heart rates above 60 since medication adjustments.  He is currently receiving Oxy 5 milligrams during the day and Seroquel every evening. He was previously receiving Ativan during the day with improvement however family thinks that he would be too sedated during the day and requested changing this to nightly only.  Sepsis (HCC)  Previous bronchoscopy had shown growth of Pseudomonas and Serratia was started on Zosyn. We will continue Zosyn for total of 10-day course.  Continue for 2 more days.  Seizure-like activity (HCC)  No evidence of seizure on EEG.  Episodes relieved by ativan.  He received 100 mg IM biweekly testosterone with long-lasting formulation.  Can consider repeating testosterone levels after 1 week.  Umbilical hernia without  obstruction and without gangrene  Evaluated by surgery.  Found to not be a surgical candidate due to ongoing comorbidities.  Fat containing incarcerated hernia without bowel.  Palliative care by specialist  Palliative care is following.  Had long discussion with family about goals of care.  They would like to continue all measures.  The are willing to care for him at home.  They reiterated their wishes that he would not like to be sent to a rehab facility.  Disposition: Critical care    ICU Core Measures     Vented Patient  VAP Bundle  VAP bundle ordered     A: Assess, Prevent, and Manage Pain Has pain been assessed? Yes  Need for changes to pain regimen? Yes   B: Both Spontaneous Awakening Trials (SATs) and Spontaneous Breathing Trials (SBTs) Plan to perform spontaneous awakening trial today? Yes   Plan to perform spontaneous breathing trial today? Yes   Obvious barriers to extubation? Yes   C: Choice of Sedation RASS Goal: 0 Alert and Calm  Need for changes to sedation or analgesia regimen? Yes   D: Delirium CAM-ICU: Negative   E: Early Mobility  Plan for early mobility? Yes   F: Family Engagement Plan for family engagement today? Yes       Antibiotic Review: Continue broad spectrum secondary to severity of illness.     Review of Invasive Devices:      Central access plan: Patient has multiple central venous catheters.       Prophylaxis:  VTE VTE covered by:  apixaban, Per PEG Tube, 5 mg at 10/15/24 7406       Stress Ulcer  not ordered         24 Hour Events :   Seems to be more agitated since adjusting his benzodiazepine to be as needed at night only.  Overnight had become more agitated, this point we are still using Oxy 5 milligrams during the day and Seroquel in the evening.  Will try to optimize his medical condition before attempting discharge.    Subjective   Review of Systems: See HPI for Review of Systems    Objective :                   Vitals I/O      Most Recent Min/Max in 24hrs   Temp 98.6 °F (37 °C)  Temp  Min: 96.3 °F (35.7 °C)  Max: 99 °F (37.2 °C)   Pulse 69 Pulse  Min: 51  Max: 115   Resp 18 No data recorded   /98 BP  Min: 82/50  Max: 162/102   O2 Sat 100 % SpO2  Min: 89 %  Max: 100 %      Intake/Output Summary (Last 24 hours) at 10/16/2024 0653  Last data filed at 10/16/2024 0613  Gross per 24 hour   Intake 2370 ml   Output 935 ml   Net 1435 ml       Diet Enteral/Parenteral; Tube Feeding No Oral Diet; Jevity 1.5; Continuous; 70; 200; Water; Every 4 hours    Invasive Monitoring           Physical Exam   Physical Exam  Vitals reviewed.   Eyes:      General: No scleral icterus.        Right eye: No discharge.         Left eye: No discharge.   Skin:     General: Skin is cool.      Capillary Refill: Capillary refill takes less than 2 seconds.   HENT:      Head: Normocephalic and atraumatic.      Mouth/Throat:      Mouth: Mucous membranes are moist.      Comments: Profuse saliva from mouth.  Cardiovascular:      Rate and Rhythm: Normal rate and regular rhythm.      Heart sounds: Normal heart sounds.   Musculoskeletal:      Right lower le+ Edema present.      Left lower le+ Edema present.   Abdominal:      Palpations: Abdomen is soft.      Tenderness: There is no abdominal tenderness.   Constitutional:       General: He is in acute distress.      Appearance: He is well-developed. He is ill-appearing, toxic-appearing and diaphoretic.      Interventions: He is intubated and restrained.   Pulmonary:      Effort: He is intubated.      Breath sounds: No wheezing, rhonchi or rales.   Neurological:      Mental Status: He is agitated.      Motor: Strength full and intact in all extremities.   Genitourinary/Anorectal:  [place] present.        Diagnostic Studies        Lab Results: I have reviewed the following results:     Medications:  Scheduled PRN   apixaban, 5 mg, BID  chlorhexidine, 15 mL, Q12H ISAEL  levalbuterol, 1.25 mg, Q6H  midodrine, 15 mg, Q8H ISAEL  oxyCODONE, 5 mg, Q6H  piperacillin-tazobactam, 4.5 g,  Q8H  polyethylene glycol, 17 g, Daily  QUEtiapine, 25 mg, HS  senna-docusate sodium, 2 tablet, BID  sodium chloride, 4 mL, Q6H      acetaminophen, 650 mg, Q6H PRN  bisacodyl, 10 mg, Daily PRN  LORazepam, 2 mg, HS PRN  midazolam, 2 mg, Q4H PRN       Continuous            Labs:   CBC    Recent Labs     10/15/24  0436 10/16/24  0433   WBC 8.60 9.33   HGB 11.8* 12.1   HCT 36.7 38.5    229     BMP    Recent Labs     10/15/24  1639 10/16/24  0433   SODIUM 143 143   K 3.9 3.8   * 108   CO2 30 29   AGAP 4 6   BUN 16 16   CREATININE 0.73 0.79   CALCIUM 8.0* 8.0*       Coags    No recent results     Additional Electrolytes  Recent Labs     10/15/24  0436 10/15/24  1639 10/16/24  0433   MG 2.7  --  2.5   PHOS 1.8* 2.5* 4.0   CAIONIZED 1.08*  --  1.12          Blood Gas    No recent results  No recent results   LFTs  No recent results    Infectious  No recent results    Glucose  Recent Labs     10/14/24  2245 10/15/24  0436 10/15/24  1639 10/16/24  0433   GLUC 118 98 102 80

## 2024-10-16 NOTE — ASSESSMENT & PLAN NOTE
Palliative care is following.  Had long discussion with family about goals of care.  They would like to continue all measures.  The are willing to care for him at home.  They reiterated their wishes that he would not like to be sent to a rehab facility.

## 2024-10-17 ENCOUNTER — TELEPHONE (OUTPATIENT)
Age: 39
End: 2024-10-17

## 2024-10-17 ENCOUNTER — APPOINTMENT (INPATIENT)
Dept: RADIOLOGY | Facility: HOSPITAL | Age: 39
DRG: 720 | End: 2024-10-17
Payer: COMMERCIAL

## 2024-10-17 PROBLEM — R93.89 ABNORMAL MRI: Status: ACTIVE | Noted: 2024-10-17

## 2024-10-17 LAB
ANA SER QL IA: NEGATIVE
ANION GAP SERPL CALCULATED.3IONS-SCNC: 5 MMOL/L (ref 4–13)
BUN SERPL-MCNC: 15 MG/DL (ref 5–25)
CA-I BLD-SCNC: 1.12 MMOL/L (ref 1.12–1.32)
CALCIUM SERPL-MCNC: 8 MG/DL (ref 8.4–10.2)
CHLORIDE SERPL-SCNC: 110 MMOL/L (ref 96–108)
CK SERPL-CCNC: 208 U/L (ref 39–308)
CO2 SERPL-SCNC: 27 MMOL/L (ref 21–32)
CREAT SERPL-MCNC: 0.65 MG/DL (ref 0.6–1.3)
ERYTHROCYTE [DISTWIDTH] IN BLOOD BY AUTOMATED COUNT: 20 % (ref 11.6–15.1)
GFR SERPL CREATININE-BSD FRML MDRD: 124 ML/MIN/1.73SQ M
GLUCOSE SERPL-MCNC: 89 MG/DL (ref 65–140)
HCT VFR BLD AUTO: 38 % (ref 36.5–49.3)
HGB BLD-MCNC: 11.7 G/DL (ref 12–17)
MAGNESIUM SERPL-MCNC: 2.5 MG/DL (ref 1.9–2.7)
MCH RBC QN AUTO: 30.2 PG (ref 26.8–34.3)
MCHC RBC AUTO-ENTMCNC: 30.8 G/DL (ref 31.4–37.4)
MCV RBC AUTO: 98 FL (ref 82–98)
PHOSPHATE SERPL-MCNC: 2.2 MG/DL (ref 2.7–4.5)
PLATELET # BLD AUTO: 205 THOUSANDS/UL (ref 149–390)
PMV BLD AUTO: 10.8 FL (ref 8.9–12.7)
POTASSIUM SERPL-SCNC: 3.3 MMOL/L (ref 3.5–5.3)
RBC # BLD AUTO: 3.88 MILLION/UL (ref 3.88–5.62)
SODIUM SERPL-SCNC: 142 MMOL/L (ref 135–147)
WBC # BLD AUTO: 10.6 THOUSAND/UL (ref 4.31–10.16)

## 2024-10-17 PROCEDURE — 84255 ASSAY OF SELENIUM: CPT

## 2024-10-17 PROCEDURE — 86038 ANTINUCLEAR ANTIBODIES: CPT

## 2024-10-17 PROCEDURE — 71045 X-RAY EXAM CHEST 1 VIEW: CPT

## 2024-10-17 PROCEDURE — 85027 COMPLETE CBC AUTOMATED: CPT

## 2024-10-17 PROCEDURE — 80048 BASIC METABOLIC PNL TOTAL CA: CPT

## 2024-10-17 PROCEDURE — 83735 ASSAY OF MAGNESIUM: CPT

## 2024-10-17 PROCEDURE — 99232 SBSQ HOSP IP/OBS MODERATE 35: CPT | Performed by: INTERNAL MEDICINE

## 2024-10-17 PROCEDURE — 99221 1ST HOSP IP/OBS SF/LOW 40: CPT | Performed by: OTOLARYNGOLOGY

## 2024-10-17 PROCEDURE — 82550 ASSAY OF CK (CPK): CPT

## 2024-10-17 PROCEDURE — 84100 ASSAY OF PHOSPHORUS: CPT

## 2024-10-17 PROCEDURE — 31502 CHANGE OF WINDPIPE AIRWAY: CPT | Performed by: OTOLARYNGOLOGY

## 2024-10-17 PROCEDURE — 94003 VENT MGMT INPAT SUBQ DAY: CPT

## 2024-10-17 PROCEDURE — 99233 SBSQ HOSP IP/OBS HIGH 50: CPT | Performed by: PSYCHIATRY & NEUROLOGY

## 2024-10-17 PROCEDURE — 94150 VITAL CAPACITY TEST: CPT

## 2024-10-17 PROCEDURE — 82330 ASSAY OF CALCIUM: CPT

## 2024-10-17 PROCEDURE — 0BJ08ZZ INSPECTION OF TRACHEOBRONCHIAL TREE, VIA NATURAL OR ARTIFICIAL OPENING ENDOSCOPIC: ICD-10-PCS | Performed by: OTOLARYNGOLOGY

## 2024-10-17 PROCEDURE — 31622 DX BRONCHOSCOPE/WASH: CPT | Performed by: OTOLARYNGOLOGY

## 2024-10-17 PROCEDURE — 0B21XFZ CHANGE TRACHEOSTOMY DEVICE IN TRACHEA, EXTERNAL APPROACH: ICD-10-PCS | Performed by: OTOLARYNGOLOGY

## 2024-10-17 PROCEDURE — 94640 AIRWAY INHALATION TREATMENT: CPT

## 2024-10-17 PROCEDURE — 94760 N-INVAS EAR/PLS OXIMETRY 1: CPT

## 2024-10-17 PROCEDURE — 82085 ASSAY OF ALDOLASE: CPT

## 2024-10-17 PROCEDURE — 84630 ASSAY OF ZINC: CPT

## 2024-10-17 RX ORDER — LANOLIN ALCOHOL/MO/W.PET/CERES
3 CREAM (GRAM) TOPICAL
Status: DISCONTINUED | OUTPATIENT
Start: 2024-10-17 | End: 2024-10-31 | Stop reason: HOSPADM

## 2024-10-17 RX ORDER — DIAZEPAM 10 MG/2ML
5 INJECTION, SOLUTION INTRAMUSCULAR; INTRAVENOUS ONCE
Status: COMPLETED | OUTPATIENT
Start: 2024-10-17 | End: 2024-10-17

## 2024-10-17 RX ORDER — GABAPENTIN 250 MG/5ML
100 SOLUTION ORAL 3 TIMES DAILY
Status: DISCONTINUED | OUTPATIENT
Start: 2024-10-17 | End: 2024-10-21

## 2024-10-17 RX ORDER — SODIUM CHLORIDE, SODIUM GLUCONATE, SODIUM ACETATE, POTASSIUM CHLORIDE, MAGNESIUM CHLORIDE, SODIUM PHOSPHATE, DIBASIC, AND POTASSIUM PHOSPHATE .53; .5; .37; .037; .03; .012; .00082 G/100ML; G/100ML; G/100ML; G/100ML; G/100ML; G/100ML; G/100ML
500 INJECTION, SOLUTION INTRAVENOUS ONCE
Status: COMPLETED | OUTPATIENT
Start: 2024-10-17 | End: 2024-10-17

## 2024-10-17 RX ORDER — FENTANYL CITRATE 50 UG/ML
50 INJECTION, SOLUTION INTRAMUSCULAR; INTRAVENOUS ONCE
Status: CANCELLED | OUTPATIENT
Start: 2024-10-17

## 2024-10-17 RX ADMIN — SODIUM CHLORIDE, SODIUM GLUCONATE, SODIUM ACETATE, POTASSIUM CHLORIDE, MAGNESIUM CHLORIDE, SODIUM PHOSPHATE, DIBASIC, AND POTASSIUM PHOSPHATE 500 ML: .53; .5; .37; .037; .03; .012; .00082 INJECTION, SOLUTION INTRAVENOUS at 15:12

## 2024-10-17 RX ADMIN — APIXABAN 5 MG: 5 TABLET, FILM COATED ORAL at 08:56

## 2024-10-17 RX ADMIN — GABAPENTIN 100 MG: 250 SOLUTION ORAL at 16:31

## 2024-10-17 RX ADMIN — Medication 4 ML: at 07:40

## 2024-10-17 RX ADMIN — OXYCODONE HYDROCHLORIDE 5 MG: 5 TABLET ORAL at 16:31

## 2024-10-17 RX ADMIN — LEVALBUTEROL HYDROCHLORIDE 1.25 MG: 1.25 SOLUTION RESPIRATORY (INHALATION) at 13:23

## 2024-10-17 RX ADMIN — PIPERACILLIN SODIUM AND TAZOBACTAM SODIUM 4.5 G: 36; 4.5 INJECTION, POWDER, LYOPHILIZED, FOR SOLUTION INTRAVENOUS at 16:31

## 2024-10-17 RX ADMIN — DIAZEPAM 5 MG: 10 INJECTION, SOLUTION INTRAMUSCULAR; INTRAVENOUS at 23:16

## 2024-10-17 RX ADMIN — MIDAZOLAM 2 MG: 1 INJECTION INTRAMUSCULAR; INTRAVENOUS at 14:42

## 2024-10-17 RX ADMIN — Medication 4 ML: at 19:51

## 2024-10-17 RX ADMIN — OXYCODONE HYDROCHLORIDE 5 MG: 5 TABLET ORAL at 09:07

## 2024-10-17 RX ADMIN — OXYCODONE HYDROCHLORIDE 5 MG: 5 TABLET ORAL at 05:00

## 2024-10-17 RX ADMIN — OXYCODONE HYDROCHLORIDE 5 MG: 5 TABLET ORAL at 21:37

## 2024-10-17 RX ADMIN — APIXABAN 5 MG: 5 TABLET, FILM COATED ORAL at 18:13

## 2024-10-17 RX ADMIN — MIDODRINE HYDROCHLORIDE 15 MG: 5 TABLET ORAL at 21:37

## 2024-10-17 RX ADMIN — MIDAZOLAM 2 MG: 1 INJECTION INTRAMUSCULAR; INTRAVENOUS at 18:44

## 2024-10-17 RX ADMIN — Medication 3 MG: at 21:37

## 2024-10-17 RX ADMIN — LEVALBUTEROL HYDROCHLORIDE 1.25 MG: 1.25 SOLUTION RESPIRATORY (INHALATION) at 19:51

## 2024-10-17 RX ADMIN — PIPERACILLIN SODIUM AND TAZOBACTAM SODIUM 4.5 G: 36; 4.5 INJECTION, POWDER, LYOPHILIZED, FOR SOLUTION INTRAVENOUS at 23:18

## 2024-10-17 RX ADMIN — CHLORHEXIDINE GLUCONATE 15 ML: 1.2 RINSE ORAL at 08:56

## 2024-10-17 RX ADMIN — Medication 4 ML: at 02:30

## 2024-10-17 RX ADMIN — LEVALBUTEROL HYDROCHLORIDE 1.25 MG: 1.25 SOLUTION RESPIRATORY (INHALATION) at 07:40

## 2024-10-17 RX ADMIN — POTASSIUM PHOSPHATE, MONOBASIC AND POTASSIUM PHOSPHATE, DIBASIC 30 MMOL: 224; 236 INJECTION, SOLUTION, CONCENTRATE INTRAVENOUS at 06:38

## 2024-10-17 RX ADMIN — MIDODRINE HYDROCHLORIDE 15 MG: 5 TABLET ORAL at 14:43

## 2024-10-17 RX ADMIN — CHLORHEXIDINE GLUCONATE 15 ML: 1.2 RINSE ORAL at 21:37

## 2024-10-17 RX ADMIN — MIDODRINE HYDROCHLORIDE 15 MG: 5 TABLET ORAL at 05:00

## 2024-10-17 RX ADMIN — PIPERACILLIN SODIUM AND TAZOBACTAM SODIUM 4.5 G: 36; 4.5 INJECTION, POWDER, LYOPHILIZED, FOR SOLUTION INTRAVENOUS at 08:56

## 2024-10-17 RX ADMIN — Medication 4 ML: at 13:23

## 2024-10-17 RX ADMIN — GABAPENTIN 100 MG: 250 SOLUTION ORAL at 11:35

## 2024-10-17 RX ADMIN — MIDAZOLAM 2 MG: 1 INJECTION INTRAMUSCULAR; INTRAVENOUS at 07:12

## 2024-10-17 RX ADMIN — LEVALBUTEROL HYDROCHLORIDE 1.25 MG: 1.25 SOLUTION RESPIRATORY (INHALATION) at 02:30

## 2024-10-17 RX ADMIN — GABAPENTIN 100 MG: 250 SOLUTION ORAL at 21:37

## 2024-10-17 NOTE — ASSESSMENT & PLAN NOTE
Previous bronchoscopy had shown growth of Pseudomonas and Serratia was started on Zosyn for 10-day course.

## 2024-10-17 NOTE — RESPIRATORY THERAPY NOTE
Called to room by RN. Pt not ventilating. RN bagging pt. RT arrived, bagged pt at 100% fio2. Pt initially difficult to bag, pt became easier to bag. Pts spo2 and HR increased. RT placed pt back on vent. Pt ventilating well on vent at this time.

## 2024-10-17 NOTE — ASSESSMENT & PLAN NOTE
Neuromuscular disease post chemotherapyS/P tracheostomy in 9/2023.  He continues to fail SBT.  Yesterday underwent bronchoscopy with findings of significant mucous plugging.  He does not have respiratory drive at this point.  This is contributing to his worsening prognosis.    Plan:  Continue vent support  Xopnex and hypertonic saline nebs 3 times daily

## 2024-10-17 NOTE — PROGRESS NOTES
Progress Note - Critical Care/ICU   Name: Hemanth Swanson 39 y.o. male I MRN: [id number]  Unit/Bed#: ICU 05 I Date of Admission: 10/5/2024   Date of Service: 10/17/2024 I Hospital Day: 12      Assessment & Plan  Toxic metabolic encephalopathy  Continues to remain encephalopathic with periods of agitation.  This morning he was significantly agitated and dislodged his trach tube causing desaturation to the 30s.  Can consider gabapentin 100 mg 3 times daily and titrate to 300 mg 3 times daily per PMR recommendations  Yesterday required Ativan 2 mg twice, Versed 2 mg 4 times due to agitation and need for sedation for MRI.  MRI showed significant findings for possible hypoxic injury.    Will continue to discuss with family about goals of care.  Given his current medical condition, prognosis seems poor.  However family has reiterated his wish to home 7.2 any rehab facilities.  He has a high mobility of his plugging with desaturations requiring intervention.  Chronic respiratory failure with hypoxia and hypercapnia (HCC)  Neuromuscular disease post chemotherapyS/P tracheostomy in 9/2023.  He continues to fail SBT.  Yesterday underwent bronchoscopy with findings of significant mucous plugging.  He does not have respiratory drive at this point.  This is contributing to his worsening prognosis.    Plan:  Continue vent support  Xopnex and hypertonic saline nebs 3 times daily  Bradycardia  Bradycardia episode seems to be resolved.  He has been maintaining heart rates above 60 since medication adjustments.  He is currently receiving Oxy 5 milligrams during the day and Seroquel every evening. He was previously receiving Ativan during the day with improvement however family thinks that he would be too sedated during the day and requested changing this to nightly only.  Sepsis (HCC)  Previous bronchoscopy had shown growth of Pseudomonas and Serratia was started on Zosyn for 10-day course.  Seizure-like activity (HCC)  No evidence  of seizure on EEG.  Episodes relieved by ativan.  He received 100 mg IM biweekly testosterone with long-lasting formulation.  Can consider repeating testosterone levels after 1 week.  Umbilical hernia without obstruction and without gangrene  Evaluated by surgery.  Found to not be a surgical candidate due to ongoing comorbidities.  Fat containing incarcerated hernia without bowel.  Palliative care by specialist  Palliative care is following.  Had long discussion with family about goals of care.  They would like to continue all measures.  The are willing to care for him at home.  They reiterated their wishes that he would not like to be sent to a rehab facility.  Disposition: Critical care    ICU Core Measures     Vented Patient  VAP Bundle  VAP bundle ordered     A: Assess, Prevent, and Manage Pain Has pain been assessed? Yes  Need for changes to pain regimen? Yes   B: Both Spontaneous Awakening Trials (SATs) and Spontaneous Breathing Trials (SBTs) Plan to perform spontaneous awakening trial today? Yes   Plan to perform spontaneous breathing trial today? Yes   Obvious barriers to extubation? Yes   C: Choice of Sedation RASS Goal: 0 Alert and Calm  Need for changes to sedation or analgesia regimen? Yes   D: Delirium CAM-ICU: Negative   E: Early Mobility  Plan for early mobility? Yes   F: Family Engagement Plan for family engagement today? Yes       Antibiotic Review: Continue broad spectrum secondary to severity of illness.     Review of Invasive Devices:      Central access plan: Patient has multiple central venous catheters.       Prophylaxis:  VTE VTE covered by:  apixaban, Per PEG Tube, 5 mg at 10/16/24 3461       Stress Ulcer  not ordered         24 Hour Events :   Patient seen and examined at bedside this morning.  He was agitated moving his upper extremities and head.  This dislodged his trach tube causing desaturations to 30%.  When he remained calm trach tube was reattached and his saturations  improved.    Subjective   Review of Systems: See HPI for Review of Systems    Objective :                   Vitals I/O      Most Recent Min/Max in 24hrs   Temp 98.1 °F (36.7 °C) Temp  Min: 96.8 °F (36 °C)  Max: 98.6 °F (37 °C)   Pulse (!) 48 Pulse  Min: 44  Max: 107   Resp 18 Resp  Min: 18  Max: 18   /83 BP  Min: 85/53  Max: 162/98   O2 Sat 100 % SpO2  Min: 94 %  Max: 100 %      Intake/Output Summary (Last 24 hours) at 10/17/2024 0701  Last data filed at 10/17/2024 0600  Gross per 24 hour   Intake 3266.34 ml   Output 975 ml   Net 2291.34 ml       Diet Enteral/Parenteral; Tube Feeding No Oral Diet; Jevity 1.5; Continuous; 70; 200; Water; Every 4 hours    Invasive Monitoring           Physical Exam   Physical Exam  Vitals reviewed.   Eyes:      General: No scleral icterus.        Right eye: No discharge.         Left eye: No discharge.   Skin:     General: Skin is cool.      Capillary Refill: Capillary refill takes less than 2 seconds.   HENT:      Head: Normocephalic and atraumatic.      Mouth/Throat:      Mouth: Mucous membranes are moist.      Comments: Profuse saliva from mouth.  Cardiovascular:      Rate and Rhythm: Normal rate and regular rhythm.      Heart sounds: Normal heart sounds.   Musculoskeletal:      Right lower leg: Trace Edema present.      Left lower leg: Trace Edema present.   Abdominal:      Palpations: Abdomen is soft.      Tenderness: There is no abdominal tenderness.   Constitutional:       General: He is in acute distress.      Appearance: He is well-developed. He is ill-appearing, toxic-appearing and diaphoretic.      Interventions: He is intubated and restrained.   Pulmonary:      Effort: He is intubated.      Breath sounds: No wheezing, rhonchi or rales.   Neurological:      Mental Status: He is agitated.      Motor: Strength full and intact in all extremities.      Comments: Wax and waning agitation   Genitourinary/Anorectal:  Sheppard present.        Diagnostic Studies        Lab  Results: I have reviewed the following results:     Medications:  Scheduled PRN   apixaban, 5 mg, BID  chlorhexidine, 15 mL, Q12H ISAEL  levalbuterol, 1.25 mg, Q6H  midodrine, 15 mg, Q8H ISAEL  oxyCODONE, 5 mg, Q6H  piperacillin-tazobactam, 4.5 g, Q8H  potassium phosphate, 30 mmol, Once  QUEtiapine, 25 mg, HS  senna-docusate sodium, 1 tablet, HS  sodium chloride, 4 mL, Q6H      acetaminophen, 650 mg, Q6H PRN  bisacodyl, 10 mg, Daily PRN  midazolam, 2 mg, Q4H PRN       Continuous            Labs:   CBC    Recent Labs     10/16/24  0433 10/17/24  0458   WBC 9.33 10.60*   HGB 12.1 11.7*   HCT 38.5 38.0    205   BANDSPCT 1  --      BMP    Recent Labs     10/16/24  0433 10/17/24  0458   SODIUM 143 142   K 3.8 3.3*    110*   CO2 29 27   AGAP 6 5   BUN 16 15   CREATININE 0.79 0.65   CALCIUM 8.0* 8.0*       Coags    No recent results     Additional Electrolytes  Recent Labs     10/16/24  0433 10/17/24  0458   MG 2.5 2.5   PHOS 4.0 2.2*   CAIONIZED 1.12 1.12          Blood Gas    No recent results  No recent results   LFTs  No recent results    Infectious  No recent results    Glucose  Recent Labs     10/15/24  1639 10/16/24  0433 10/17/24  0458   GLUC 102 80 89

## 2024-10-17 NOTE — TELEPHONE ENCOUNTER
Dmitry is calling for the nurse Bushra, she can be reached at [phone]. Would like to discuss the upcoming plans: CT, etc.

## 2024-10-17 NOTE — PLAN OF CARE
Problem: Prexisting or High Potential for Compromised Skin Integrity  Goal: Skin integrity is maintained or improved  Description: INTERVENTIONS:  - Identify patients at risk for skin breakdown  - Assess and monitor skin integrity  - Assess and monitor nutrition and hydration status  - Monitor labs   - Assess for incontinence   - Turn and reposition patient  - Assist with mobility/ambulation  - Relieve pressure over bony prominences  - Avoid friction and shearing  - Provide appropriate hygiene as needed including keeping skin clean and dry  - Evaluate need for skin moisturizer/barrier cream  - Collaborate with interdisciplinary team   - Patient/family teaching  - Consider wound care consult   Outcome: Progressing     Problem: PAIN - ADULT  Goal: Verbalizes/displays adequate comfort level or baseline comfort level  Description: Interventions:  - Encourage patient to monitor pain and request assistance  - Assess pain using appropriate pain scale  - Administer analgesics based on type and severity of pain and evaluate response  - Implement non-pharmacological measures as appropriate and evaluate response  - Consider cultural and social influences on pain and pain management  - Notify physician/advanced practitioner if interventions unsuccessful or patient reports new pain  Outcome: Progressing     Problem: INFECTION - ADULT  Goal: Absence or prevention of progression during hospitalization  Description: INTERVENTIONS:  - Assess and monitor for signs and symptoms of infection  - Monitor lab/diagnostic results  - Monitor all insertion sites, i.e. indwelling lines, tubes, and drains  - Monitor endotracheal if appropriate and nasal secretions for changes in amount and color  - Reinbeck appropriate cooling/warming therapies per order  - Administer medications as ordered  - Instruct and encourage patient and family to use good hand hygiene technique  - Identify and instruct in appropriate isolation precautions for  identified infection/condition  Outcome: Progressing  Goal: Absence of fever/infection during neutropenic period  Description: INTERVENTIONS:  - Monitor WBC    Outcome: Progressing     Problem: SAFETY ADULT  Goal: Patient will remain free of falls  Description: INTERVENTIONS:  - Educate patient/family on patient safety including physical limitations  - Instruct patient to call for assistance with activity   - Consult OT/PT to assist with strengthening/mobility   - Keep Call bell within reach  - Keep bed low and locked with side rails adjusted as appropriate  - Keep care items and personal belongings within reach  - Initiate and maintain comfort rounds  - Make Fall Risk Sign visible to staff  - Offer Toileting every 2 Hours, in advance of need  - Initiate/Maintain bed alarm  - Obtain necessary fall risk management equipment  - Apply yellow socks and bracelet for high fall risk patients  - Consider moving patient to room near nurses station  Outcome: Progressing  Goal: Maintain or return to baseline ADL function  Description: INTERVENTIONS:  -  Assess patient's ability to carry out ADLs; assess patient's baseline for ADL function and identify physical deficits which impact ability to perform ADLs (bathing, care of mouth/teeth, toileting, grooming, dressing, etc.)  - Assess/evaluate cause of self-care deficits   - Assess range of motion  - Assess patient's mobility; develop plan if impaired  - Assess patient's need for assistive devices and provide as appropriate  - Encourage maximum independence but intervene and supervise when necessary  - Involve family in performance of ADLs  - Assess for home care needs following discharge   - Consider OT consult to assist with ADL evaluation and planning for discharge  - Provide patient education as appropriate  Outcome: Progressing  Goal: Maintains/Returns to pre admission functional level  Description: INTERVENTIONS:  - Perform AM-PAC 6 Click Basic Mobility/ Daily Activity  assessment daily.  - Set and communicate daily mobility goal to care team and patient/family/caregiver.   - Collaborate with rehabilitation services on mobility goals if consulted  - Perform Range of Motion 3 times a day.  - Reposition patient every 2 hours.  - Dangle patient 3 times a day  - Stand patient 3 times a day  - Ambulate patient 3 times a day  - Out of bed to chair 3 times a day   - Out of bed for meals 3 times a day  - Out of bed for toileting  - Record patient progress and toleration of activity level   Outcome: Progressing     Problem: DISCHARGE PLANNING  Goal: Discharge to home or other facility with appropriate resources  Description: INTERVENTIONS:  - Identify barriers to discharge w/patient and caregiver  - Arrange for needed discharge resources and transportation as appropriate  - Identify discharge learning needs (meds, wound care, etc.)  - Arrange for interpretive services to assist at discharge as needed  - Refer to Case Management Department for coordinating discharge planning if the patient needs post-hospital services based on physician/advanced practitioner order or complex needs related to functional status, cognitive ability, or social support system  Outcome: Progressing     Problem: Knowledge Deficit  Goal: Patient/family/caregiver demonstrates understanding of disease process, treatment plan, medications, and discharge instructions  Description: Complete learning assessment and assess knowledge base.  Interventions:  - Provide teaching at level of understanding  - Provide teaching via preferred learning methods  Outcome: Progressing     Problem: SAFETY,RESTRAINT: NV/NON-SELF DESTRUCTIVE BEHAVIOR  Goal: Remains free of harm/injury (restraint for non violent/non self-detsructive behavior)  Description: INTERVENTIONS:  - Instruct patient/family regarding restraint use   - Assess and monitor physiologic and psychological status   - Provide interventions and comfort measures to meet  assessed patient needs   - Identify and implement measures to help patient regain control  - Assess readiness for release of restraint   Outcome: Progressing  Goal: Returns to optimal restraint-free functioning  Description: INTERVENTIONS:  - Assess the patient's behavior and symptoms that indicate continued need for restraint  - Identify and implement measures to help patient regain control  - Assess readiness for release of restraint   Outcome: Progressing     Problem: Nutrition/Hydration-ADULT  Goal: Nutrient/Hydration intake appropriate for improving, restoring or maintaining nutritional needs  Description: Monitor and assess patient's nutrition/hydration status for malnutrition. Collaborate with interdisciplinary team and initiate plan and interventions as ordered.  Monitor patient's weight and dietary intake as ordered or per policy. Utilize nutrition screening tool and intervene as necessary. Determine patient's food preferences and provide high-protein, high-caloric foods as appropriate.     INTERVENTIONS:  - Monitor oral intake, urinary output, labs, and treatment plans  - Assess nutrition and hydration status and recommend course of action  - Evaluate amount of meals eaten  - Assist patient with eating if necessary   - Allow adequate time for meals  - Recommend/ encourage appropriate diets, oral nutritional supplements, and vitamin/mineral supplements  - Order, calculate, and assess calorie counts as needed  - Recommend, monitor, and adjust tube feedings and TPN/PPN based on assessed needs  - Assess need for intravenous fluids  - Provide specific nutrition/hydration education as appropriate  - Include patient/family/caregiver in decisions related to nutrition  Outcome: Progressing

## 2024-10-17 NOTE — ASSESSMENT & PLAN NOTE
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     Initially presented on 10/5 to [facility] after pulling out G-tube. Found  he was noted to be tachycardic, hypoxic, and febrile with leukocytosis.PNA concern on chest imaging. He was subsequently transferred to [facility] ICU for further monitoring and management. Bronchoscopy cultures positive for Proteus and Pseudomonas.     Neurology initially consulted for seizure like activity in the setting of hypoxia/bradycardia/hypotension. Episodes were captured on video EEG and without correlates, thus felt to be \"vagal response and possibly convulsive syncope in the setting of hypoxia/bradycardia.  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    Plan:  - Will review imaging and discuss further with attending if any additional diagnostics/workup warranted given the abnormal MRI   -Consider short term re-imaging with MRI brain in few weeks to see if resolution of above lesion  - On Eliquis for thromboembolism/PE  - Monitor neuro exam; notify with any changes  - Medical management and supportive care per ICU  "

## 2024-10-17 NOTE — RESPIRATORY THERAPY NOTE
Pt bradied and desaturated. Pt peak pressures in 40s, vent unable to deliver volumes do to high pressures needed. RT took pt off vent and bagged. Pt initially hard to bag, after about a minute pt became easier to bag. Pt spo2 came back to acceptable levels and pt HR back to normal. RT placed pt back on vent. Pt ventilating well on vent at this time.

## 2024-10-17 NOTE — ASSESSMENT & PLAN NOTE
Continues to remain encephalopathic with periods of agitation.  This morning he was significantly agitated and dislodged his trach tube causing desaturation to the 30s.  Can consider gabapentin 100 mg 3 times daily and titrate to 300 mg 3 times daily per PMR recommendations  Yesterday required Ativan 2 mg twice, Versed 2 mg 4 times due to agitation and need for sedation for MRI.  MRI showed significant findings for possible hypoxic injury.    Will continue to discuss with family about goals of care.  Given his current medical condition, prognosis seems poor.  However family has reiterated his wish to home 7.2 any rehab facilities.  He has a high mobility of his plugging with desaturations requiring intervention.

## 2024-10-17 NOTE — ASSESSMENT & PLAN NOTE
-See above, status post video EEG this admission   -Captured events without EEG correlate; felt to be related to vagal response/convulsive syncope in the setting hypoxia/bradycardia/hypotension  -No need for AED initiation

## 2024-10-17 NOTE — ASSESSMENT & PLAN NOTE
- Noted to be febrile, tachycardic, with elevated WBC/lactic acid on presentation  - Initial CXR: Complete opacification of the left hemithorax with mediastinal shift to the left indicating at least in part atelectasis.   - COVID/flu/RSV negative  - Blood cultures, UA negative  - Sputum culture positive for Proteus mirabilis, Pseudomonas aeruginosa  - IV Zosyn  - Medical management per ICU

## 2024-10-17 NOTE — CONSULTS
Otorhinolaryngology - Head & Neck Surgery Consultation    Date of Service: 10/17/2024    Reason for consult: trach concerns    ASSESSMENT/PLAN:    -trach changed, continue routine trach care  -rest of care per primary    Please consult ENT again if additional questions/concerns arise during this admission.    MARIZOL Swanson is a 39 y.o. male c PMH of HTN, CROW, L testicular cancer mets, toxic metabolic encephalopathy, vent/trach dependent.  ICU experienced some difficulties c trach change on the floor on 10/17/24, requested ENT support.    LABORATORY  -    RADIOLOGY  -    PROCEDURES  Tracheoscopy was first performed through trach stoma, identifying appropriate trach position c prashant visualized.  Flexible suctioned performed through trach to clean out main bronchi.  The old trach was removed and replaced with new Roseannaley 8-0 distal XLT cuffed trach (cuff up).  Patient tolerated the procedure well without acute complications/complaints.  Correct trach position and mucus clearance was confirmed c repeat tracheoscopy.    CURRENT HOSPITAL MEDICATIONS  Current Facility-Administered Medications   Medication Dose Route Frequency Provider Last Rate Last Admin    acetaminophen (TYLENOL) oral suspension 650 mg  650 mg Per PEG Tube Q6H PRN Ne Low MD        apixaban (ELIQUIS) tablet 5 mg  5 mg Per PEG Tube BID Mohsen Moon MD   5 mg at 10/17/24 0856    bisacodyl (DULCOLAX) rectal suppository 10 mg  10 mg Rectal Daily PRN Ne Low MD        chlorhexidine (PERIDEX) 0.12 % oral rinse 15 mL  15 mL Mouth/Throat Q12H [facility] Mohsen Moon MD   15 mL at 10/17/24 0856    gabapentin (NEURONTIN) oral solution 100 mg  100 mg Per PEG Tube TID Ne Low MD   100 mg at 10/17/24 1135    levalbuterol (XOPENEX) inhalation solution 1.25 mg  1.25 mg Nebulization Q6H Silvio Alas MD   1.25 mg at 10/17/24 1323    midazolam (VERSED) injection 2 mg  2 mg Intravenous Q4H PRN  Bhupinder Angela MD   2 mg at 10/17/24 1442    midodrine (PROAMATINE) tablet 15 mg  15 mg Per PEG Tube Q8H [facility] El Sams MD   15 mg at 10/17/24 1443    oxyCODONE (ROXICODONE) IR tablet 5 mg  5 mg Per PEG Tube Q6H El Sams MD   5 mg at 10/17/24 0907    piperacillin-tazobactam (ZOSYN) 4.5 g in sodium chloride 0.9 % 100 mL IVPB (EXTENDED INFUSION)  4.5 g Intravenous Q8H Hermelindo Cuadra PA-C   Stopped at 10/17/24 1400    sodium chloride 3 % inhalation solution 4 mL  4 mL Nebulization Q6H John Sutherland MD   4 mL at 10/17/24 1323       REVIEW OF SYSTEMS  As above    HISTORIES  PMH:  Past Medical History:   Diagnosis Date    Anxiety     Cancer (HCC)     Depression     Hypernatremia 10/06/2024    Migration of percutaneous endoscopic gastrostomy (PEG) tube (HCC) 2023    Psychiatric disorder     bipolar       PSH:  Past Surgical History:   Procedure Laterality Date    IR BIOPSY LYMPH NODE  2023    IR GASTROSTOMY (G) TUBE CHECK/CHANGE/REINSERTION/UPSIZE  2024    IR GASTROSTOMY TUBE PLACEMENT  2023    IR LUMBAR PUNCTURE  2023    IR PORT PLACEMENT  2023    ORCHIECTOMY Left 2022    Procedure: ORCHIECTOMY INGUINAL;  Surgeon: Flip Michael MD;  Location:  MAIN OR;  Service: Urology    PEG W/TRACHEOSTOMY PLACEMENT N/A 2023    Procedure: TRACHEOSTOMY WITH INSERTION PEG TUBE;  Surgeon: Rudy Mcmanus MD;  Location: [facility];  Service: General    TESTICLE SURGERY         SH:  Social History     Tobacco Use    Smoking status: Former     Current packs/day: 0.00     Average packs/day: 0.7 packs/day for 22.7 years (15.0 ttl pk-yrs)     Types: Cigarettes     Start date: 2008     Quit date: 2023     Years since quittin.3    Smokeless tobacco: Never   Vaping Use    Vaping status: Former    Start date: 2018    Quit date: 2023    Substances: Nicotine, THC   Substance Use Topics    Alcohol use: Not Currently     Comment: Former alcoholic. Last use in 2016  "   Drug use: Not Currently     Types: \"Crack\" cocaine, Marijuana     Comment: Current use medical marijuana. Not currently using crack cocaine.       FH:  Family History   Problem Relation Age of Onset    Coronary artery disease Maternal Aunt     Cancer Father     Diabetes Father     Hypertension Father        ALLERGIES:  Allergies   Allergen Reactions    Dog Epithelium Cough, Sneezing and Nasal Congestion       PHYSICAL EXAM  Visit Vitals  BP 94/54   Pulse 90   Temp (!) 97 °F (36.1 °C) (Tympanic)   Resp 18   Ht 6' 4\" (1.93 m)   Wt 91.1 kg (200 lb 13.4 oz)   SpO2 99%   BMI 24.45 kg/m²   Smoking Status Former   BSA 2.22 m²       General: resting in bed  Ears: External appearance normal  Nose: External appearance normal  Oral cavity: External appearance normal  Neck: trach in place  Lungs: Normal work of breathing, symmetrical chest expansion on vent  Vascular: Well perfused    Patient Active Problem List    Diagnosis Date Noted    Abnormal MRI 10/17/2024    Palliative care by specialist 10/11/2024    Goals of care, counseling/discussion 10/11/2024    Toxic metabolic encephalopathy 10/07/2024    Cardiac arrest due to other underlying condition ([facility]) 06/11/2024    Seizure-like activity ([facility]) 06/02/2024    Bradycardia 06/01/2024    Ventilator dependent ([facility]) 05/30/2024    Obesity hypoventilation syndrome ([facility]) 10/24/2023    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    Psychophysiological insomnia 10/18/2023    Impaired mobility 09/24/2023    Status post tracheostomy ([facility]) 09/14/2023    S/P percutaneous endoscopic gastrostomy (PEG) tube placement ([facility]) 09/12/2023    Anxiety 09/06/2023    Chronic respiratory failure with hypoxia and hypercapnia ([facility]) 08/24/2023    Sepsis ([facility]) 08/24/2023    Acute pulmonary embolism without acute cor pulmonale ([facility]) 08/23/2023    Depression 08/16/2023    History of LVH (left ventricular hypertrophy) 07/19/2023    Pure hypertriglyceridemia 07/19/2023    Unilateral vestibular " schwannoma ([facility]) 04/20/2023    Port-A-Cath in place 03/08/2023    Diplopia 02/22/2023    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[facility]) 01/03/2023    Umbilical hernia without obstruction and without gangrene 12/10/2022    Tobacco use 12/10/2022    Retroperitoneal lymphadenopathy 12/10/2022       Quinton Davis MD  PGY-3  Otorhinolaryngology - Head & Neck Surgery

## 2024-10-17 NOTE — ASSESSMENT & PLAN NOTE
- Tracheostomy with vent dependence  - Episodes of hypoxia; patient tampering with trach balloon this afternoon  - Undergoing trach exchange via ENT this afternoon  - Medical management per ICU

## 2024-10-17 NOTE — WOUND OSTOMY CARE
Consult Note - Wound   Hemanth Swanson 39 y.o. male MRN: [id number]  Unit/Bed#: ICU 05 Encounter: [id number]        History and Present Illness:  Patient is 38 yo male admitted to [facility] with toxic metabolic encephalopathy. Patient has history of HTN, CROW, metastatic CA of left testicle, and vent/trach dependent. Patient is incontinent of bowel and indwelling puga catheter. Patient is dependent with all care. Patient receives enteral nutrition. Wound care consulted for right hand wound.    Assessment Findings:   Sacral/buttocks and B/L heels intact and blanching, preventative skin care orders placed.     Right hand- traumatic wound from patient hitting hand against bedside rail, now full thickness with yellow slough tissue, periwound with edema and erythema, scant serosanguineous drainage.     No induration, fluctuance, odor, warmth/temperature differences, redness, or purulence noted to the above noted wounds and skin areas assessed. New dressings applied per orders listed below. Patient tolerated well- no s/s of non-verbal pain or discomfort observed during the encounter. Bedside nurse aware of plan of care. See flow sheets for more detailed assessment findings.  Wound care will continue to follow, call or Secure Chat Message with questions.     Skin care plans:  1-Hydraguard to bilateral sacrum, buttock and heels BID and PRN  2-Elevate heels to offload pressure.  3-Ehob cushion in chair when out of bed.  4-Moisturize skin daily with skin nourishing cream.  5-Turn/reposition q2h or when medically stable for pressure re-distribution on skin.   6-Cleanse right hand with normal saline, apply normalgel to wound bed, cover with silicone bordered foam. Change daily and PRN soilage/displacement.    Wounds:  Wound 10/08/24 Traumatic Skin tear Hand Posterior;Right (Active)   Wound Image   10/17/24 0956   Wound Description Yellow;Slough 10/17/24 0956   Donna-wound Assessment Fragile;Edema;Erythema 10/17/24 0956   Wound Length  (cm) 0.8 cm 10/17/24 0956   Wound Width (cm) 2 cm 10/17/24 0956   Wound Depth (cm) 0.2 cm 10/17/24 0956   Wound Surface Area (cm^2) 1.6 cm^2 10/17/24 0956   Wound Volume (cm^3) 0.32 cm^3 10/17/24 0956   Calculated Wound Volume (cm^3) 0.32 cm^3 10/17/24 0956   Wound Site Closure BLANCO 10/17/24 0956   Drainage Amount Scant 10/17/24 0956   Drainage Description Serosanguineous 10/17/24 0956   Non-staged Wound Description Full thickness 10/17/24 0956   Treatments Cleansed 10/17/24 0956   Dressing Foam, Silicon (eg. Allevyn, etc);Silvasorb gel 10/17/24 0956   Wound packed? No 10/17/24 0956   Packing- # removed 0 10/17/24 0956   Packing- # inserted 0 10/17/24 0956   Dressing Changed New 10/17/24 0956   Patient Tolerance Tolerated well 10/17/24 0956   Dressing Status Clean;Dry;Intact 10/17/24 0956           Odalys Saul BSN, RN, CWOCN

## 2024-10-17 NOTE — RESPIRATORY THERAPY NOTE
RT came to room after hearing vent alarm. RT found that pt had pulled  balloon off trach. RT requested that RN get AP. At this point pt pts spo2 was in 90s. AP arrived. RT prepped trach for trach exchange. AP was unable to get trach out. Provider called to room. Provider was unable to get trach out. Attempted to aspirate air out of cuff with needle. Provider unable to get trach out. ENT called. At this time provider was able to reinflate cuff, pt now getting volumes on ventilator. ENT arrived. ENT performed trach exchange. Shliey 8 XLT distal placed by ENT. Pt placed back on vent.

## 2024-10-17 NOTE — PROGRESS NOTES
"Progress Note - Neurology   Name: Hemanth Swanson 39 y.o. 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     Initially presented on 10/5 to [facility] after pulling out G-tube. Found  he was noted to be tachycardic, hypoxic, and febrile with leukocytosis.PNA concern on chest imaging. He was subsequently transferred to [facility] ICU for further monitoring and management. Bronchoscopy cultures positive for Proteus and Pseudomonas.     Neurology initially consulted for seizure like activity in the setting of hypoxia/bradycardia/hypotension. Episodes were captured on video EEG and without correlates, thus felt to be \"vagal response and possibly convulsive syncope in the setting of hypoxia/bradycardia.  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    Plan:  - Will review imaging and discuss further with attending if any additional diagnostics/workup warranted given the abnormal MRI   -Consider short term re-imaging with MRI brain in few weeks to see if resolution of above lesion  - On Eliquis for thromboembolism/PE  - Monitor neuro exam; notify with any changes  - Medical management and supportive care per ICU  Seizure-like activity " (HCC)  -See above, status post video EEG this admission   -Captured events without EEG correlate; felt to be related to vagal response/convulsive syncope in the setting hypoxia/bradycardia/hypotension  -No need for AED initiation  Sepsis (HCC)  - Noted to be febrile, tachycardic, with elevated WBC/lactic acid on presentation  - Initial CXR: Complete opacification of the left hemithorax with mediastinal shift to the left indicating at least in part atelectasis.   - COVID/flu/RSV negative  - Blood cultures, UA negative  - Sputum culture positive for Proteus mirabilis, Pseudomonas aeruginosa  - IV Zosyn  - Medical management per ICU  Chronic respiratory failure with hypoxia and hypercapnia (HCC)  - Tracheostomy with vent dependence  - Episodes of hypoxia; patient tampering with trach balloon this afternoon  - Undergoing trach exchange via ENT this afternoon  - Medical management per ICU  Bradycardia  - Noted with HR dropping as low as 30-40s  - Received atropine on 10/8 for episode of arrest/pause and on 10/9 for bradycardia  - Cardiology following; appreciate recommendations  -No recommendation for PPM at this time  -Suspected to be in the setting of vasovagal/agitation  - Telemetry  - Medical management per ICU and Cardiology  Goals of care, counseling/discussion      Will further discuss plan of care with attending neurologist.    Recommendations for outpatient neurological follow up have yet to be determined.    Subjective   Reviewed neurology consult/progress notes from last week (and extensive neuromuscular workup/prior neurology notes in June-July 2024). Asked to re-evaluate regarding abnormal MRI brain yesterday. Patient per critical care pulled out balloon in trach, thus undergo trach exchange via ENT (thus not formally examined).    Review of Systems   Unable to perform ROS: Acuity of condition         Objective :  Temp:  [97.9 °F (36.6 °C)-98.2 °F (36.8 °C)] 98.1 °F (36.7 °C)  HR:  [] 60  BP:  ()/() 105/71  Resp:  [18] 18  SpO2:  [82 %-100 %] 100 %  O2 Device: Ventilator  FiO2 (%):  [40] 40    Neuro exam deferred as patient undergoing trach exchange.    Physical ExamNeurological Exam      Lab Results: I have reviewed the following results:  Imaging Results Review: I personally reviewed the following image studies in PACS and associated radiology reports.  XR chest portable   Final Result by Tobias Leon MD (10/17 1328)      Study limited by low lung volumes. Persistent dense  left  lung base opacity which may represent a combination of effusion and parenchymal opacity.            Workstation performed: [id number]         MRI brain w wo contrast   Final Result by Andi Mccloud MD (10/17 0112)      Mildly restricted diffusion in the splenium of the corpus callosum and more subtly in the anterior cingulate gyri. Possible sequela of hypoxic ischemic injury, inflammatory or infectious process.      The study was marked in EPIC for immediate notification..      Workstation performed: [id number]         XR chest portable ICU   Final Result by Jewel Burton MD (10/15 1381)      Opacification at both lung bases is unchanged likely due to atelectasis. Infiltrates and pleural effusions not excluded.            Workstation performed: [id number]         CT chest abdomen pelvis w contrast   Final Result by Philippe Coates MD (10/11 8481)         1. Moderate bibasilar pulmonary opacification consistent with atelectasis; superimposed pneumonia not entirely excludable. Trace left greater than right pleural effusions.   2. No acute abnormality identified in the abdomen or pelvis.   3. Additional findings as noted.               Workstation performed: [id number]         XR chest portable ICU   Final Result by Brendon Snyder MD (10/11 0215)      Left basilar effusion and atelectasis redemonstrated. Milder right basilar effusion/subsegmental atelectasis, also similar.          Workstation performed: [id number]         CT head wo contrast   Final Result by Andi Mccloud MD (10/09 2001)      No acute intracranial abnormality.                  Workstation performed: [id number]         XR chest portable ICU   Final Result by Andrea Bai MD (10/09 0823)      Left basilar effusion and atelectasis redemonstrated. Milder right basilar effusion/subsegmental atelectasis, also similar.            Workstation performed: [id number]         XR chest portable ICU   Final Result by Carlos Atkinson MD (10/07 1303)      Near complete opacification of the left hemithorax with leftward mediastinal shift, mildly improved from 10/5/2024, suggesting baseline underlying atelectasis and pleural effusion.      Note, left lung pneumonia cannot be excluded.      No pneumothorax.            Resident: SONAM KNOX I, the attending radiologist, have reviewed the images and agree with the final report above.      Workstation performed: [id number]             Other Study Results Review: No additional pertinent studies reviewed.    VTE Pharmacologic Prophylaxis: Sequential compression device (Venodyne)  and Eliquis    Please see attending's attestation for total time spent/billing. Discussed plan of care with critical care: reviewing MRI findings, need for any additional workup.    Please note dictation software was used in the formulation of this note. Please keep that in mind in light of any grammatical errors.

## 2024-10-17 NOTE — ASSESSMENT & PLAN NOTE
- Noted with HR dropping as low as 30-40s  - Received atropine on 10/8 for episode of arrest/pause and on 10/9 for bradycardia  - Cardiology following; appreciate recommendations  -No recommendation for PPM at this time  -Suspected to be in the setting of vasovagal/agitation  - Telemetry  - Medical management per ICU and Cardiology

## 2024-10-17 NOTE — DISCHARGE INSTR - OTHER ORDERS
Skin care plans:  1-Hydraguard to bilateral sacrum, buttock and heels BID and PRN  2-Elevate heels to offload pressure.  3-Ehob cushion in chair when out of bed.  4-Moisturize skin daily with skin nourishing cream.  5-Turn/reposition q2h or when medically stable for pressure re-distribution on skin.   6-Cleanse right hand with normal saline, apply normalgel to wound bed, cover with silicone bordered foam. Change daily and PRN soilage/displacement.  7-left ear: cleanse with NSS soaked gauze. Pat dry. Cover area with 3M no sting barrier daily.   8-Utilize Yoltooise Repositioning System and air support mattress overlay for standard mattress upon transfer or discharge

## 2024-10-17 NOTE — PLAN OF CARE
Problem: SAFETY,RESTRAINT: NV/NON-SELF DESTRUCTIVE BEHAVIOR  Goal: Remains free of harm/injury (restraint for non violent/non self-detsructive behavior)  Description: INTERVENTIONS:  - Instruct patient/family regarding restraint use   - Assess and monitor physiologic and psychological status   - Provide interventions and comfort measures to meet assessed patient needs   - Identify and implement measures to help patient regain control  - Assess readiness for release of restraint   Outcome: Progressing  Goal: Returns to optimal restraint-free functioning  Description: INTERVENTIONS:  - Assess the patient's behavior and symptoms that indicate continued need for restraint  - Identify and implement measures to help patient regain control  - Assess readiness for release of restraint   Outcome: Progressing     Problem: SAFETY ADULT  Goal: Patient will remain free of falls  Description: INTERVENTIONS:  - Educate patient/family on patient safety including physical limitations  - Instruct patient to call for assistance with activity   - Consult OT/PT to assist with strengthening/mobility   - Keep Call bell within reach  - Keep bed low and locked with side rails adjusted as appropriate  - Keep care items and personal belongings within reach  - Initiate and maintain comfort rounds  - Make Fall Risk Sign visible to staff  - Apply yellow socks and bracelet for high fall risk patients  - Consider moving patient to room near nurses station  Outcome: Progressing  Goal: Maintain or return to baseline ADL function  Description: INTERVENTIONS:  -  Assess patient's ability to carry out ADLs; assess patient's baseline for ADL function and identify physical deficits which impact ability to perform ADLs (bathing, care of mouth/teeth, toileting, grooming, dressing, etc.)  - Assess/evaluate cause of self-care deficits   - Assess range of motion  - Assess patient's mobility; develop plan if impaired  - Assess patient's need for assistive  devices and provide as appropriate  - Encourage maximum independence but intervene and supervise when necessary  - Involve family in performance of ADLs  - Assess for home care needs following discharge   - Consider OT consult to assist with ADL evaluation and planning for discharge  - Provide patient education as appropriate  Outcome: Progressing  Goal: Maintains/Returns to pre admission functional level  Description: INTERVENTIONS:  - Perform AM-PAC 6 Click Basic Mobility/ Daily Activity assessment daily.  - Set and communicate daily mobility goal to care team and patient/family/caregiver.   - Collaborate with rehabilitation services on mobility goals if consulted  - Out of bed for toileting  - Record patient progress and toleration of activity level   Outcome: Progressing

## 2024-10-18 LAB
ALDOLASE SERPL-CCNC: 22.4 U/L (ref 3.3–10.3)
ANION GAP SERPL CALCULATED.3IONS-SCNC: 4 MMOL/L (ref 4–13)
BACTERIA SPT RESP CULT: ABNORMAL
BACTERIA SPT RESP CULT: ABNORMAL
BUN SERPL-MCNC: 15 MG/DL (ref 5–25)
CA-I BLD-SCNC: 1.12 MMOL/L (ref 1.12–1.32)
CALCIUM SERPL-MCNC: 8.2 MG/DL (ref 8.4–10.2)
CHLORIDE SERPL-SCNC: 108 MMOL/L (ref 96–108)
CO2 SERPL-SCNC: 28 MMOL/L (ref 21–32)
CREAT SERPL-MCNC: 0.57 MG/DL (ref 0.6–1.3)
ERYTHROCYTE [DISTWIDTH] IN BLOOD BY AUTOMATED COUNT: 19.5 % (ref 11.6–15.1)
GFR SERPL CREATININE-BSD FRML MDRD: 131 ML/MIN/1.73SQ M
GLUCOSE SERPL-MCNC: 113 MG/DL (ref 65–140)
GRAM STN SPEC: ABNORMAL
HCT VFR BLD AUTO: 35.4 % (ref 36.5–49.3)
HGB BLD-MCNC: 11 G/DL (ref 12–17)
MAGNESIUM SERPL-MCNC: 2.3 MG/DL (ref 1.9–2.7)
MCH RBC QN AUTO: 30.6 PG (ref 26.8–34.3)
MCHC RBC AUTO-ENTMCNC: 31.1 G/DL (ref 31.4–37.4)
MCV RBC AUTO: 98 FL (ref 82–98)
PHOSPHATE SERPL-MCNC: 2.6 MG/DL (ref 2.7–4.5)
PLATELET # BLD AUTO: 203 THOUSANDS/UL (ref 149–390)
PMV BLD AUTO: 10.8 FL (ref 8.9–12.7)
POTASSIUM SERPL-SCNC: 3.9 MMOL/L (ref 3.5–5.3)
RBC # BLD AUTO: 3.6 MILLION/UL (ref 3.88–5.62)
SODIUM SERPL-SCNC: 140 MMOL/L (ref 135–147)
WBC # BLD AUTO: 9.14 THOUSAND/UL (ref 4.31–10.16)

## 2024-10-18 PROCEDURE — 99233 SBSQ HOSP IP/OBS HIGH 50: CPT | Performed by: PSYCHIATRY & NEUROLOGY

## 2024-10-18 PROCEDURE — 94640 AIRWAY INHALATION TREATMENT: CPT

## 2024-10-18 PROCEDURE — 94003 VENT MGMT INPAT SUBQ DAY: CPT

## 2024-10-18 PROCEDURE — 99232 SBSQ HOSP IP/OBS MODERATE 35: CPT | Performed by: INTERNAL MEDICINE

## 2024-10-18 PROCEDURE — 83735 ASSAY OF MAGNESIUM: CPT

## 2024-10-18 PROCEDURE — 94150 VITAL CAPACITY TEST: CPT

## 2024-10-18 PROCEDURE — 82330 ASSAY OF CALCIUM: CPT

## 2024-10-18 PROCEDURE — 86255 FLUORESCENT ANTIBODY SCREEN: CPT | Performed by: NURSE PRACTITIONER

## 2024-10-18 PROCEDURE — 86051 AQUAPORIN-4 ANTB ELISA: CPT | Performed by: NURSE PRACTITIONER

## 2024-10-18 PROCEDURE — 84100 ASSAY OF PHOSPHORUS: CPT

## 2024-10-18 PROCEDURE — 85027 COMPLETE CBC AUTOMATED: CPT

## 2024-10-18 PROCEDURE — 94760 N-INVAS EAR/PLS OXIMETRY 1: CPT

## 2024-10-18 PROCEDURE — 93005 ELECTROCARDIOGRAM TRACING: CPT

## 2024-10-18 PROCEDURE — 86596 VOLTAGE-GTD CA CHNL ANTB EA: CPT | Performed by: NURSE PRACTITIONER

## 2024-10-18 PROCEDURE — 86341 ISLET CELL ANTIBODY: CPT | Performed by: NURSE PRACTITIONER

## 2024-10-18 PROCEDURE — 80048 BASIC METABOLIC PNL TOTAL CA: CPT

## 2024-10-18 RX ORDER — DIAZEPAM 10 MG/2ML
5 INJECTION, SOLUTION INTRAMUSCULAR; INTRAVENOUS ONCE
Status: COMPLETED | OUTPATIENT
Start: 2024-10-18 | End: 2024-10-18

## 2024-10-18 RX ORDER — DIAZEPAM 10 MG/2ML
INJECTION, SOLUTION INTRAMUSCULAR; INTRAVENOUS
Status: COMPLETED
Start: 2024-10-18 | End: 2024-10-18

## 2024-10-18 RX ORDER — DIAZEPAM 10 MG/2ML
2.5 INJECTION, SOLUTION INTRAMUSCULAR; INTRAVENOUS ONCE
Status: DISCONTINUED | OUTPATIENT
Start: 2024-10-18 | End: 2024-10-18

## 2024-10-18 RX ORDER — DIAZEPAM 10 MG/2ML
2.5 INJECTION, SOLUTION INTRAMUSCULAR; INTRAVENOUS ONCE
Status: COMPLETED | OUTPATIENT
Start: 2024-10-18 | End: 2024-10-18

## 2024-10-18 RX ORDER — DIAZEPAM 10 MG/2ML
5 INJECTION, SOLUTION INTRAMUSCULAR; INTRAVENOUS EVERY 8 HOURS SCHEDULED
Status: DISCONTINUED | OUTPATIENT
Start: 2024-10-18 | End: 2024-10-21

## 2024-10-18 RX ORDER — ALBUMIN, HUMAN INJ 5% 5 %
12.5 SOLUTION INTRAVENOUS ONCE
Status: COMPLETED | OUTPATIENT
Start: 2024-10-18 | End: 2024-10-18

## 2024-10-18 RX ADMIN — DIAZEPAM 5 MG: 10 INJECTION, SOLUTION INTRAMUSCULAR; INTRAVENOUS at 14:22

## 2024-10-18 RX ADMIN — Medication 4 ML: at 07:22

## 2024-10-18 RX ADMIN — DIAZEPAM 5 MG: 10 INJECTION, SOLUTION INTRAMUSCULAR; INTRAVENOUS at 18:20

## 2024-10-18 RX ADMIN — APIXABAN 5 MG: 5 TABLET, FILM COATED ORAL at 16:45

## 2024-10-18 RX ADMIN — MIDODRINE HYDROCHLORIDE 15 MG: 5 TABLET ORAL at 05:16

## 2024-10-18 RX ADMIN — LEVALBUTEROL HYDROCHLORIDE 1.25 MG: 1.25 SOLUTION RESPIRATORY (INHALATION) at 02:43

## 2024-10-18 RX ADMIN — Medication 4 ML: at 19:37

## 2024-10-18 RX ADMIN — DIAZEPAM 5 MG: 10 INJECTION, SOLUTION INTRAMUSCULAR; INTRAVENOUS at 08:56

## 2024-10-18 RX ADMIN — Medication 4 ML: at 13:17

## 2024-10-18 RX ADMIN — CHLORHEXIDINE GLUCONATE 15 ML: 1.2 RINSE ORAL at 08:56

## 2024-10-18 RX ADMIN — CHLORHEXIDINE GLUCONATE 15 ML: 1.2 RINSE ORAL at 20:39

## 2024-10-18 RX ADMIN — PIPERACILLIN SODIUM AND TAZOBACTAM SODIUM 4.5 G: 36; 4.5 INJECTION, POWDER, LYOPHILIZED, FOR SOLUTION INTRAVENOUS at 16:47

## 2024-10-18 RX ADMIN — LEVALBUTEROL HYDROCHLORIDE 1.25 MG: 1.25 SOLUTION RESPIRATORY (INHALATION) at 19:37

## 2024-10-18 RX ADMIN — OXYCODONE HYDROCHLORIDE 5 MG: 5 TABLET ORAL at 05:16

## 2024-10-18 RX ADMIN — POTASSIUM PHOSPHATE, MONOBASIC AND POTASSIUM PHOSPHATE, DIBASIC 9 MMOL: 224; 236 INJECTION, SOLUTION, CONCENTRATE INTRAVENOUS at 08:30

## 2024-10-18 RX ADMIN — GABAPENTIN 100 MG: 250 SOLUTION ORAL at 20:40

## 2024-10-18 RX ADMIN — APIXABAN 5 MG: 5 TABLET, FILM COATED ORAL at 08:56

## 2024-10-18 RX ADMIN — OXYCODONE HYDROCHLORIDE 5 MG: 5 TABLET ORAL at 20:40

## 2024-10-18 RX ADMIN — DIAZEPAM 5 MG: 10 INJECTION, SOLUTION INTRAMUSCULAR; INTRAVENOUS at 21:57

## 2024-10-18 RX ADMIN — GABAPENTIN 100 MG: 250 SOLUTION ORAL at 08:59

## 2024-10-18 RX ADMIN — LEVALBUTEROL HYDROCHLORIDE 1.25 MG: 1.25 SOLUTION RESPIRATORY (INHALATION) at 07:22

## 2024-10-18 RX ADMIN — MIDODRINE HYDROCHLORIDE 15 MG: 5 TABLET ORAL at 14:22

## 2024-10-18 RX ADMIN — PIPERACILLIN SODIUM AND TAZOBACTAM SODIUM 4.5 G: 36; 4.5 INJECTION, POWDER, LYOPHILIZED, FOR SOLUTION INTRAVENOUS at 08:59

## 2024-10-18 RX ADMIN — LEVALBUTEROL HYDROCHLORIDE 1.25 MG: 1.25 SOLUTION RESPIRATORY (INHALATION) at 13:17

## 2024-10-18 RX ADMIN — OXYCODONE HYDROCHLORIDE 5 MG: 5 TABLET ORAL at 14:22

## 2024-10-18 RX ADMIN — DIAZEPAM 2.5 MG: 10 INJECTION, SOLUTION INTRAMUSCULAR; INTRAVENOUS at 10:18

## 2024-10-18 RX ADMIN — MIDODRINE HYDROCHLORIDE 15 MG: 5 TABLET ORAL at 21:37

## 2024-10-18 RX ADMIN — Medication 4 ML: at 02:43

## 2024-10-18 RX ADMIN — GABAPENTIN 100 MG: 250 SOLUTION ORAL at 16:47

## 2024-10-18 RX ADMIN — DIAZEPAM 2.5 MG: 5 INJECTION INTRAMUSCULAR; INTRAVENOUS at 10:18

## 2024-10-18 RX ADMIN — ALBUMIN (HUMAN) 12.5 G: 12.5 INJECTION, SOLUTION INTRAVENOUS at 21:54

## 2024-10-18 RX ADMIN — Medication 3 MG: at 21:58

## 2024-10-18 NOTE — ASSESSMENT & PLAN NOTE
No evidence of seizure on EEG.  Episodes relieved by ativan.  Unclear benefit from antipsychotics.

## 2024-10-18 NOTE — RESPIRATORY THERAPY NOTE
Pt taken off vent and bagged due to low spo2. Pt difficult to bag initially, pt became easier to bag. Pt placed back on vent when episode resolved.

## 2024-10-18 NOTE — ASSESSMENT & PLAN NOTE
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 Patient initially presented on 10/5 to [facility] after pulling out G-tube. He was noted to be tachycardic, hypoxic, and febrile with leukocytosis. PNA concern on chest imaging. He was subsequently transferred to [facility] ICU for further monitoring and management. Bronchoscopy cultures positive for Proteus and Pseudomonas.     Neurology initially consulted for seizure like activity in the setting of hypoxia/bradycardia/hypotension. Episodes were captured on video EEG and without correlates, thus felt to be \"vagal response and possibly convulsive syncope in the setting of hypoxia/bradycardia.  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 Unclear etiology at this time. Would recommend repeat MRI in 1 month to re-evaluate findings.    Workup:  -10/9 CT head: No acute intracranial abnormality.  -10/16 MRI brain w/wo contrast: Mildly restricted diffusion in the splenium of the corpus callosum and more subtly in the anterior cingulate gyri. Possible sequela of hypoxic ischemic injury, inflammatory or infectious process.   -10/10 vEEG: This 18 hour continuous video-EEG recording is abnormal. Multiple events of rocking up and down in bed (as described above) and multiple events of left arm shaking, jaw tremor, at times with upward eye deviation, as " well as one event of body stiffening were captured, all without EEG changes suggesting these events were not seizures. Intermittent left temporal delta activities suggest an underlying area of neuronal dysfunction. No electrographic seizures or interictal epileptiform discharges were seen.     Plan:  - Per attending neurologist, recommend repeat MRI brain w/wo contrast in 1 month to re-evaluate lesion noted  - Recommend obtaining paraneoplastic comprehensive profile serum study  - On Eliquis for thromboembolism/PE  - Monitor neuro exam; notify with any changes  - Medical management and supportive care per ICU team. Correction of any metabolic or infectious disturbances.   -Case and treatment plan reviewed with attending neurologist, Dr. Geiger. Please see attending attestation for any further recommendations.

## 2024-10-18 NOTE — PROGRESS NOTES
"Progress Note - Neurology   Name: Hemanth Swanson 39 y.o. 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 Patient initially presented on 10/5 to [facility] after pulling out G-tube. He was noted to be tachycardic, hypoxic, and febrile with leukocytosis. PNA concern on chest imaging. He was subsequently transferred to [facility] ICU for further monitoring and management. Bronchoscopy cultures positive for Proteus and Pseudomonas.     Neurology initially consulted for seizure like activity in the setting of hypoxia/bradycardia/hypotension. Episodes were captured on video EEG and without correlates, thus felt to be \"vagal response and possibly convulsive syncope in the setting of hypoxia/bradycardia.  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 Unclear etiology at this time. Would recommend repeat MRI in 1 month to re-evaluate findings.    Workup:  -10/9 CT head: No acute intracranial abnormality.  -10/16 MRI brain w/wo contrast: Mildly restricted diffusion in the splenium of the corpus callosum and more subtly in the anterior cingulate gyri. Possible sequela of hypoxic ischemic injury, inflammatory or infectious process.   -10/10 vEEG: " This 18 hour continuous video-EEG recording is abnormal. Multiple events of rocking up and down in bed (as described above) and multiple events of left arm shaking, jaw tremor, at times with upward eye deviation, as well as one event of body stiffening were captured, all without EEG changes suggesting these events were not seizures. Intermittent left temporal delta activities suggest an underlying area of neuronal dysfunction. No electrographic seizures or interictal epileptiform discharges were seen.     Plan:  - Per attending neurologist, recommend repeat MRI brain w/wo contrast in 1 month to re-evaluate lesion noted  - Recommend obtaining paraneoplastic comprehensive profile serum study  - On Eliquis for thromboembolism/PE  - Monitor neuro exam; notify with any changes  - Medical management and supportive care per ICU team. Correction of any metabolic or infectious disturbances.   -Case and treatment plan reviewed with attending neurologist, Dr. Geiger. Please see attending attestation for any further recommendations.  Seizure-like activity (HCC)  -See above, status post video EEG this admission   -Captured events without EEG correlate; felt to be related to vagal response/convulsive syncope in the setting hypoxia/bradycardia/hypotension  -No need for AED initiation  Sepsis (HCC)  - Noted to be febrile, tachycardic, with elevated WBC/lactic acid on presentation  - Initial CXR: Complete opacification of the left hemithorax with mediastinal shift to the left indicating at least in part atelectasis.   - COVID/flu/RSV negative  - Blood cultures, UA negative  - Sputum culture positive for Proteus mirabilis, Pseudomonas aeruginosa  - IV Zosyn  - Medical management per ICU  Chronic respiratory failure with hypoxia and hypercapnia (HCC)  - Tracheostomy with vent dependence  - Episodes of hypoxia; patient tampering with trach balloon  - Underwent trach exchange via ENT  - Medical management per ICU  Bradycardia  - Noted  with HR dropping as low as 30-40s  - Received atropine on 10/8 for episode of arrest/pause and on 10/9 for bradycardia  - Cardiology following; appreciate recommendations  -No recommendation for PPM at this time  -Suspected to be in the setting of vasovagal/agitation  - Telemetry  - Medical management per ICU and Cardiology    Hemanth Swanson will need follow-up in  4-6 weeks  with neuromuscular team for Other in 60 minute appointment. They will require a repeat MRI within 4 weeks. Message sent to schedulers.     Subjective   Patient seen and evaluated. He did not open his eyes on command and resisted when examiner attempted to open them. He followed some commands and shook his head yes to hospital when given choices on current location.    Review of Systems   Unable to perform ROS: Patient nonverbal       Objective :  Temp:  [95.4 °F (35.2 °C)-97.7 °F (36.5 °C)] 95.4 °F (35.2 °C)  HR:  [38-90] 47  BP: ()/() 105/72  Resp:  [18-19] 19  SpO2:  [95 %-100 %] 99 %  O2 Device: Ventilator  FiO2 (%):  [40] 40    Physical Exam  Vitals and nursing note reviewed.   Constitutional:       General: He is not in acute distress.     Appearance: He is obese. He is not diaphoretic.      Comments: Bilateral soft wrist restraints and mitts   HENT:      Head: Normocephalic.      Mouth/Throat:      Mouth: Mucous membranes are moist.   Pulmonary:      Effort: Pulmonary effort is normal. No respiratory distress.      Comments: Trach in place  Skin:     General: Skin is warm and dry.      Coloration: Skin is not jaundiced or pale.       Neurological Exam  Mental Status    Did not open eyes to stimuli or on command. Followed some simple commands but not all. Shakes head yes or no to questions, able to choose hospital correctly when given choices for location..    Cranial Nerves  No obvious facial asymmetry noted  Did not follow command to smile  Able to protrude tongue on command and appears midline  Did not open eyes on command  and resisted manual eyelid opening.    Motor  Normal muscle bulk throughout.  L hand  5-/5  Did not raise bilateral UE on command and did not maintain antigravity when bilateral UE were manually raised  Able to wiggle toes bilaterally  Withdrawal to painful stimuli in bilateral LE  Did not raise bilateral LE on command.    Sensory  Grimace/withdrawal to painful stimuli in bilateral UE/LE.    Reflexes  Right Plantar: mute  Left Plantar: mute        Lab Results: I have reviewed the following results:  Imaging Results Review: I reviewed radiology reports from this admission including: vEEG, chest xray, CT chest, CT abdomen/pelvis, CT head, and MRI brain.  Other Study Results Review: No additional pertinent studies reviewed.    VTE Pharmacologic Prophylaxis: VTE covered by:  apixaban, Per PEG Tube, 5 mg at 10/18/24 0856     and Sequential compression device (Venodyne)

## 2024-10-18 NOTE — QUICK NOTE
Brief Palliative Sign-off Note:     Given this patient's decision-maker's consistent and repeated desires for ongoing life-prolonging cares, and the primary team's willingness to provide such interventions, we find that the goals of the patient/decision-maker match the means of the team.    We will sign off at this time. Please reconsult us if there is a shift in goals/means, or if the patient develops symptoms we should help with.     Jordan Will, DO  Palliative and Supportive Care  Office / 24hr Answering Service: [phone]

## 2024-10-18 NOTE — RESPIRATORY THERAPY NOTE
Called to room. Upon arrival pt was being bagged. Pt difficult to bag initially. Pt became easier to bag and was placed back on vent.

## 2024-10-18 NOTE — CASE MANAGEMENT
Case Management Discharge Planning Note    Patient name Hemanth Swanson  Location ICU 05/ICU 05 MRN [id number]  : 1985 Date 10/18/2024       Current Admission Date: 10/5/2024  Current Admission Diagnosis:Toxic metabolic encephalopathy   Patient Active Problem List    Diagnosis Date Noted Date Diagnosed    Abnormal MRI 10/17/2024     Palliative care by specialist 10/11/2024     Goals of care, counseling/discussion 10/11/2024     Toxic metabolic encephalopathy 10/07/2024     Cardiac arrest due to other underlying condition (HCC) 2024     Seizure-like activity (HCC) 2024     Bradycardia 2024     Ventilator dependent ([facility]) 2024     Obesity hypoventilation syndrome (HCC) 10/24/2023     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     Psychophysiological insomnia 10/18/2023     Impaired mobility 2023     Status post tracheostomy ([facility]) 2023     S/P percutaneous endoscopic gastrostomy (PEG) tube placement ([facility]) 2023     Anxiety 2023     Chronic respiratory failure with hypoxia and hypercapnia ([facility]) 2023     Sepsis ([facility]) 2023     Acute pulmonary embolism without acute cor pulmonale ([facility]) 2023     Depression 2023     History of LVH (left ventricular hypertrophy) 2023     Pure hypertriglyceridemia 2023     Unilateral vestibular schwannoma ([facility]) 2023     Port-A-Cath in place 2023     Diplopia 2023     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HCC) 2023     Umbilical hernia without obstruction and without gangrene 12/10/2022     Tobacco use 12/10/2022     Retroperitoneal lymphadenopathy 12/10/2022       LOS (days): 13  Geometric Mean LOS (GMLOS) (days):   Days to GMLOS:     OBJECTIVE:  Risk of Unplanned Readmission Score: 36.21         Current admission status: Inpatient   Preferred Pharmacy:   [id number]  [place] - [address] [address]  Phone: [phone] Fax:  [phone]    Primary Care Provider: Ela Douglas MD    Primary Insurance: [facility]  Secondary Insurance:     DISCHARGE DETAILS:    Discharge planning discussed with:: pt's sisterDmitry  [place] of Choice: Yes  Comments - [place] of Choice: Return home with family    Contacts  Patient Contacts: Dmitry Swanson (sister)  Relationship to Patient:: Family  Contact Method: In Person  Reason/Outcome: Discharge Planning, Continuity of Care, Emergency Contact, Referral    Other Referral/Resources/Interventions Provided:  Interventions: Other (Specify)  Referral Comments:     CM spoke with [facility] resident. Aware script is needed for home VENT changes prior to pt being dc. CC resident made aware this would need to be provided to [facility] today if pt were to dc over the weekend. CM also spoke with Charley - RT with [employer] [phone]. Aware CM would update her with dc date. Charley reports that she would need to update pt's home VENT which is at bedside prior to dc with the new settings.     CM met with Dmitry and Magaly at bedside. Dmitry aware that CM is awaiting script for VENT changes and will coordinate transport upon dc to home. Dmitry aware pt also awaiting medical clearance from the team. Dmitry reports she already has tube feeds at home and the pump. She would need the pump settings so she can set the one up at home. Dmitry also aware that PT/OT has not been able to be established upon dc due to availability or insurance related issues. Dmitry also aware that Charley with [employer] would need to adjust pt's VENT settings prior to dc. Home VENT at bedside. Dmitry has also been in contact with Charley. Dmitry confirmed once pt is ready for dc she'll be ready for him to come home. She will just need to be notified of transport time. Dmitry reports that normally transport uses their VENT and she will hook up his home vent once he is home. CM notified [facility] resident that Dmitry will need tube feed settings for home.

## 2024-10-18 NOTE — ASSESSMENT & PLAN NOTE
- Tracheostomy with vent dependence  - Episodes of hypoxia; patient tampering with trach balloon  - Underwent trach exchange via ENT  - Medical management per ICU

## 2024-10-18 NOTE — PROGRESS NOTES
Progress Note - Critical Care/ICU   Name: Hemanth Swanson 39 y.o. male I MRN: [id number]  Unit/Bed#: ICU 05 I Date of Admission: 10/5/2024   Date of Service: 10/18/2024 I Hospital Day: 13      Assessment & Plan  Toxic metabolic encephalopathy  Continues to remain encephalopathic with periods of agitation.  MRI showed significant findings for possible hypoxic injury.  Will continue to discuss with family about goals of care.  Given his current medical condition, prognosis seems poor.  However family has reiterated his wish to home and denies any rehab facilities. He has a high probability plugging with desaturations requiring intervention.    Overnight had a couple of episodes of desaturations with diaphoresis and hypoxia.  Apparently was looking gray on physical exam.  They treated with Valium which seemed to help him.  Discussion to ensue regarding muscle relaxant with either baclofen or scheduled Valium.  Chronic respiratory failure with hypoxia and hypercapnia (HCC)  Neuromuscular disease post chemotherapyS/P tracheostomy in 9/2023.  He continues to fail SBT.  Yesterday underwent bronchoscopy with findings of significant mucous plugging.  He does not have respiratory drive at this point.  This is contributing to his worsening prognosis.    Plan:  Continue vent support  Xopnex and hypertonic saline nebs 3 times daily  Bradycardia  Bradycardia episode seems to be resolved.  He has been maintaining heart rates above 60 since medication adjustments.  He is currently receiving Oxy 5 milligrams during the day and Seroquel every evening.   Sepsis (HCC)  Previous bronchoscopy had shown growth of Pseudomonas and Serratia was started on Zosyn for 10-day course.  Seizure-like activity (HCC)  No evidence of seizure on EEG.  Episodes relieved by ativan.  Unclear benefit from antipsychotics.  Umbilical hernia without obstruction and without gangrene  Evaluated by surgery.  Found to not be a surgical candidate due to ongoing  comorbidities.  Fat containing incarcerated hernia without bowel.  Palliative care by specialist  Palliative care is following.  Had long discussion with family about goals of care.  They would like to continue all measures.  The are willing to care for him at home.  They reiterated their wishes that he would not like to be sent to a rehab facility.  Disposition: Critical care    ICU Core Measures     Vented Patient  VAP Bundle  VAP bundle ordered     A: Assess, Prevent, and Manage Pain Has pain been assessed? Yes  Need for changes to pain regimen? Yes   B: Both Spontaneous Awakening Trials (SATs) and Spontaneous Breathing Trials (SBTs) Plan to perform spontaneous awakening trial today? Yes   Plan to perform spontaneous breathing trial today? Yes   Obvious barriers to extubation? Yes   C: Choice of Sedation RASS Goal: 0 Alert and Calm  Need for changes to sedation or analgesia regimen? Yes   D: Delirium CAM-ICU: Negative   E: Early Mobility  Plan for early mobility? Yes   F: Family Engagement Plan for family engagement today? Yes       Antibiotic Review: Continue broad spectrum secondary to severity of illness.     Review of Invasive Devices:      Central access plan: Patient has multiple central venous catheters.       Prophylaxis:  VTE VTE covered by:  apixaban, Per PEG Tube, 5 mg at 10/17/24 1813       Stress Ulcer  not ordered         24 Hour Events :   Patient seen and examined at bedside this morning.  He had an episode of desaturation to the 30s this morning.  Seems to be resolved with Valium.    Subjective   Review of Systems: See HPI for Review of Systems    Objective :                   Vitals I/O      Most Recent Min/Max in 24hrs   Temp 97.7 °F (36.5 °C) Temp  Min: 97 °F (36.1 °C)  Max: 97.7 °F (36.5 °C)   Pulse (!) 50 Pulse  Min: 42  Max: 90   Resp 18 Resp  Min: 18  Max: 18   BP 99/67 BP  Min: 82/50  Max: 156/97   O2 Sat 99 % SpO2  Min: 82 %  Max: 100 %      Intake/Output Summary (Last 24 hours) at  10/18/2024 0629  Last data filed at 10/18/2024 0600  Gross per 24 hour   Intake 3340.5 ml   Output 870 ml   Net 2470.5 ml       Diet Enteral/Parenteral; Tube Feeding No Oral Diet; Jevity 1.5; Continuous; 70; 200; Water; Every 4 hours    Invasive Monitoring           Physical Exam   Physical Exam  Vitals reviewed.   Eyes:      General: No scleral icterus.        Right eye: No discharge.         Left eye: No discharge.   Skin:     General: Skin is cool.      Capillary Refill: Capillary refill takes less than 2 seconds.   HENT:      Head: Normocephalic and atraumatic.      Mouth/Throat:      Mouth: Mucous membranes are moist.      Comments: Profuse saliva from mouth.  Cardiovascular:      Rate and Rhythm: Normal rate and regular rhythm.      Heart sounds: Normal heart sounds.   Abdominal:      Palpations: Abdomen is soft.      Tenderness: There is no abdominal tenderness.   Constitutional:       General: He is in acute distress.      Appearance: He is well-developed. He is ill-appearing, toxic-appearing and diaphoretic.      Interventions: He is intubated and restrained.   Pulmonary:      Effort: He is intubated.      Breath sounds: No wheezing, rhonchi or rales.   Neurological:      Mental Status: He is agitated.      Motor: Strength full and intact in all extremities.      Comments: Wax and waning agitation   Genitourinary/Anorectal:  [place] present.        Diagnostic Studies        Lab Results: I have reviewed the following results:     Medications:  Scheduled PRN   apixaban, 5 mg, BID  chlorhexidine, 15 mL, Q12H ISAEL  gabapentin, 100 mg, TID  levalbuterol, 1.25 mg, Q6H  melatonin, 3 mg, HS  midodrine, 15 mg, Q8H ISAEL  oxyCODONE, 5 mg, Q6H  piperacillin-tazobactam, 4.5 g, Q8H  sodium chloride, 4 mL, Q6H      acetaminophen, 650 mg, Q6H PRN  bisacodyl, 10 mg, Daily PRN  midazolam, 2 mg, Q4H PRN       Continuous            Labs:   CBC    Recent Labs     10/17/24  0458 10/18/24  0520   WBC 10.60* 9.14   HGB 11.7* 11.0*    HCT 38.0 35.4*    203     BMP    Recent Labs     10/17/24  0458   SODIUM 142   K 3.3*   *   CO2 27   AGAP 5   BUN 15   CREATININE 0.65   CALCIUM 8.0*       Coags    No recent results     Additional Electrolytes  Recent Labs     10/17/24  0458 10/18/24  0520   MG 2.5  --    PHOS 2.2*  --    CAIONIZED 1.12 1.12          Blood Gas    No recent results  No recent results   LFTs  No recent results    Infectious  No recent results    Glucose  Recent Labs     10/17/24  0458   GLUC 89

## 2024-10-18 NOTE — RESPIRATORY THERAPY NOTE
Called by RN that pt was desaturating agai. Pt was taken off vent and bagged. By the time RT reached the room pt was back on ventilator.

## 2024-10-18 NOTE — RESPIRATORY THERAPY NOTE
Pt given manual breaths through vent. Pt continued to desaturate. Pt taken off vent and bagged. After episode resolved pt placed back on vent

## 2024-10-18 NOTE — RESPIRATORY THERAPY NOTE
RT Ventilator Management Note  Hemanth Swanson 39 y.o. male MRN: [id number]  Unit/Bed#: ICU 05 Encounter: [id number]       10/18/24 0723   Respiratory Assessment   Assessment Type During-treatment   General Appearance Eyes open/responds to voice   Respiratory Pattern Assisted   Chest Assessment Chest expansion symmetrical   Bilateral Breath Sounds Diminished;Coarse   O2 Device vent   Vent Information   Vent    Vent type     Vent Mode AC/VC   $ Pulse Oximetry Spot Check Charge Completed   SpO2 100 %   AC/VC Settings   Resp Rate (BPM) 18 BPM   Vt (mL) 600 mL   FIO2 (%) 40 %   PEEP (cmH2O) 12 cmH2O   Flow Pattern (LPM) 60 L/min   Trigger Sensitivity Flow (lpm) 3 %   Humidification Heater   Heater Temperature (Set) 98.6 °F (37 °C)   AC/VC Actuals   Resp Rate (BPM) 20 BPM   VT (mL) 721   MV 11.7   MAP (cmH2O) 19 cmH2O   Peak Pressure (cmH2O) 31 cmH2O   I/E Ratio (Obs) 1:2   Heater Temperature (Obs) 98.2 °F (36.8 °C)   Static Compliance (mL/cmH20) 41 mL/cmH2O   Plateau Pressure (cm H2O) 22 cm H2O   AC/VC Alarms   High Peak Pressure (cmH2O) 40   High Resp Rate (BPM) 40 BPM   High MV (L/min) 16 L/min   Low MV (L/min) 4 L/min   Vt High (mL) 900 mL   Vt Low (mL) 300 mL   AC/VC Apnea Settings   Resp Rate (BPM) 18 BPM   VT (mL) 600 mL   FIO2 (%) 100 %   Apnea Time (s) 20 S   Apnea Flow (L/min) 60 L/min   Maintenance   Alarm (pink) cable attached Yes   Resuscitation bag with peep valve at bedside Yes   Water bag changed No   Circuit changed No   Daily Screen   Patient safety screen outcome: Failed   Not Ready for Weaning due to: PEEP > 8cmH2O   Surgical Airway Distal;Cuffed;Other (Comment)   Placement Date/Time: 06/01/24 1155   Tube Size: (c) 8  Placed By: Physician  Placed by (Name): Dr. Ronquillo  Type: Tracheostomy  Style: (c) Distal;Cuffed;Other (Comment)   Status Cuff Inflated   Site Assessment Clean   Site Care Dressing applied   Ties Assessment Clean;Dry;Intact;Secure   Surgical Airway Cuff Pressure  (color) Green   Equipment at bedside BVM;Wall Suction setup;Additional complete same size trach tube;Additional complete one size smaller trach tube;Obturator;Sterile saline;Additional inner cannula       Daily Screen         10/17/2024  0741 10/18/2024  0723          Patient safety screen outcome:: Failed Failed      Not Ready for Weaning due to:: PEEP > 8cmH2O;Underline problem not resolved PEEP > 8cmH2O                Physical Exam:   Assessment Type: During-treatment  General Appearance: Eyes open/responds to voice  Respiratory Pattern: Assisted  Chest Assessment: Chest expansion symmetrical  Bilateral Breath Sounds: Diminished, Coarse  O2 Device: vent      Resp Comments: RT came to room after hearing vent alarm. RT found that pt had pulled  balloon off trach. RT requested that RN get AP. At this point pt pts spo2 was in 90s. AP arrived. RT prepped trach for trach exchange. AP was unable to get trach out. Provider called to room. Provider was unable to get trach out. Attempted to aspirate air out of cuff with needle. Provider unable to get trach out. ENT called. At this time provider was able to reinflate cuff, pt now getting volumes on ventilator. ENT arrived. ENT performed trach exchange. Shliey 8 XLT distal placed by ENT. Pt placed back on vent.

## 2024-10-18 NOTE — PLAN OF CARE
Problem: Prexisting or High Potential for Compromised Skin Integrity  Goal: Skin integrity is maintained or improved  Description: INTERVENTIONS:  - Identify patients at risk for skin breakdown  - Assess and monitor skin integrity  - Assess and monitor nutrition and hydration status  - Monitor labs   - Assess for incontinence   - Turn and reposition patient  - Assist with mobility/ambulation  - Relieve pressure over bony prominences  - Avoid friction and shearing  - Provide appropriate hygiene as needed including keeping skin clean and dry  - Evaluate need for skin moisturizer/barrier cream  - Collaborate with interdisciplinary team   - Patient/family teaching  - Consider wound care consult   Outcome: Progressing     Problem: PAIN - ADULT  Goal: Verbalizes/displays adequate comfort level or baseline comfort level  Description: Interventions:  - Encourage patient to monitor pain and request assistance  - Assess pain using appropriate pain scale  - Administer analgesics based on type and severity of pain and evaluate response  - Implement non-pharmacological measures as appropriate and evaluate response  - Consider cultural and social influences on pain and pain management  - Notify physician/advanced practitioner if interventions unsuccessful or patient reports new pain  Outcome: Progressing     Problem: INFECTION - ADULT  Goal: Absence or prevention of progression during hospitalization  Description: INTERVENTIONS:  - Assess and monitor for signs and symptoms of infection  - Monitor lab/diagnostic results  - Monitor all insertion sites, i.e. indwelling lines, tubes, and drains  - Monitor endotracheal if appropriate and nasal secretions for changes in amount and color  - [place] appropriate cooling/warming therapies per order  - Administer medications as ordered  - Instruct and encourage patient and family to use good hand hygiene technique  - Identify and instruct in appropriate isolation precautions for  identified infection/condition  Outcome: Progressing  Goal: Absence of fever/infection during neutropenic period  Description: INTERVENTIONS:  - Monitor WBC    Outcome: Progressing     Problem: SAFETY ADULT  Goal: Patient will remain free of falls  Description: INTERVENTIONS:  - Educate patient/family on patient safety including physical limitations  - Instruct patient to call for assistance with activity   - Consult OT/PT to assist with strengthening/mobility   - Keep Call bell within reach  - Keep bed low and locked with side rails adjusted as appropriate  - Keep care items and personal belongings within reach  - Initiate and maintain comfort rounds  - Make Fall Risk Sign visible to staff  - Offer Toileting every 2 Hours, in advance of need  - Initiate/Maintain bed alarm  - Obtain necessary fall risk management equipment  - Apply yellow socks and bracelet for high fall risk patients  - Consider moving patient to room near nurses station  Outcome: Progressing  Goal: Maintain or return to baseline ADL function  Description: INTERVENTIONS:  -  Assess patient's ability to carry out ADLs; assess patient's baseline for ADL function and identify physical deficits which impact ability to perform ADLs (bathing, care of mouth/teeth, toileting, grooming, dressing, etc.)  - Assess/evaluate cause of self-care deficits   - Assess range of motion  - Assess patient's mobility; develop plan if impaired  - Assess patient's need for assistive devices and provide as appropriate  - Encourage maximum independence but intervene and supervise when necessary  - Involve family in performance of ADLs  - Assess for home care needs following discharge   - Consider OT consult to assist with ADL evaluation and planning for discharge  - Provide patient education as appropriate  Outcome: Progressing  Goal: Maintains/Returns to pre admission functional level  Description: INTERVENTIONS:  - Perform AM-PAC 6 Click Basic Mobility/ Daily Activity  assessment daily.  - Set and communicate daily mobility goal to care team and patient/family/caregiver.   - Collaborate with rehabilitation services on mobility goals if consulted  - Perform Range of Motion 3 times a day.  - Reposition patient every 2 hours.  - Dangle patient 3 times a day  - Stand patient 3 times a day  - Ambulate patient 3 times a day  - Out of bed to chair 3 times a day   - Out of bed for meals 3 times a day  - Out of bed for toileting  - Record patient progress and toleration of activity level   Outcome: Progressing     Problem: DISCHARGE PLANNING  Goal: Discharge to home or other facility with appropriate resources  Description: INTERVENTIONS:  - Identify barriers to discharge w/patient and caregiver  - Arrange for needed discharge resources and transportation as appropriate  - Identify discharge learning needs (meds, wound care, etc.)  - Arrange for interpretive services to assist at discharge as needed  - Refer to Case Management Department for coordinating discharge planning if the patient needs post-hospital services based on physician/advanced practitioner order or complex needs related to functional status, cognitive ability, or social support system  Outcome: Progressing     Problem: Knowledge Deficit  Goal: Patient/family/caregiver demonstrates understanding of disease process, treatment plan, medications, and discharge instructions  Description: Complete learning assessment and assess knowledge base.  Interventions:  - Provide teaching at level of understanding  - Provide teaching via preferred learning methods  Outcome: Progressing     Problem: SAFETY,RESTRAINT: NV/NON-SELF DESTRUCTIVE BEHAVIOR  Goal: Remains free of harm/injury (restraint for non violent/non self-detsructive behavior)  Description: INTERVENTIONS:  - Instruct patient/family regarding restraint use   - Assess and monitor physiologic and psychological status   - Provide interventions and comfort measures to meet  assessed patient needs   - Identify and implement measures to help patient regain control  - Assess readiness for release of restraint   Outcome: Progressing  Goal: Returns to optimal restraint-free functioning  Description: INTERVENTIONS:  - Assess the patient's behavior and symptoms that indicate continued need for restraint  - Identify and implement measures to help patient regain control  - Assess readiness for release of restraint   Outcome: Progressing     Problem: Nutrition/Hydration-ADULT  Goal: Nutrient/Hydration intake appropriate for improving, restoring or maintaining nutritional needs  Description: Monitor and assess patient's nutrition/hydration status for malnutrition. Collaborate with interdisciplinary team and initiate plan and interventions as ordered.  Monitor patient's weight and dietary intake as ordered or per policy. Utilize nutrition screening tool and intervene as necessary. Determine patient's food preferences and provide high-protein, high-caloric foods as appropriate.     INTERVENTIONS:  - Monitor oral intake, urinary output, labs, and treatment plans  - Assess nutrition and hydration status and recommend course of action  - Evaluate amount of meals eaten  - Assist patient with eating if necessary   - Allow adequate time for meals  - Recommend/ encourage appropriate diets, oral nutritional supplements, and vitamin/mineral supplements  - Order, calculate, and assess calorie counts as needed  - Recommend, monitor, and adjust tube feedings and TPN/PPN based on assessed needs  - Assess need for intravenous fluids  - Provide specific nutrition/hydration education as appropriate  - Include patient/family/caregiver in decisions related to nutrition  Outcome: Progressing

## 2024-10-18 NOTE — RESPIRATORY THERAPY NOTE
Pt began desaturing again. Increased pip limit to 60 and gave manual breaths through the vent. Pt episode resolved. Pip lowered to 40 and regular vent settings resumed.

## 2024-10-18 NOTE — ASSESSMENT & PLAN NOTE
Bradycardia episode seems to be resolved.  He has been maintaining heart rates above 60 since medication adjustments.  He is currently receiving Oxy 5 milligrams during the day and Seroquel every evening.

## 2024-10-18 NOTE — ASSESSMENT & PLAN NOTE
Continues to remain encephalopathic with periods of agitation.  MRI showed significant findings for possible hypoxic injury.  Will continue to discuss with family about goals of care.  Given his current medical condition, prognosis seems poor.  However family has reiterated his wish to home and denies any rehab facilities. He has a high probability plugging with desaturations requiring intervention.    Overnight had a couple of episodes of desaturations with diaphoresis and hypoxia.  Apparently was looking gray on physical exam.  They treated with Valium which seemed to help him.  Discussion to ensue regarding muscle relaxant with either baclofen or scheduled Valium.

## 2024-10-19 LAB
ANION GAP SERPL CALCULATED.3IONS-SCNC: 3 MMOL/L (ref 4–13)
BASOPHILS # BLD AUTO: 0.05 THOUSANDS/ΜL (ref 0–0.1)
BASOPHILS NFR BLD AUTO: 0 % (ref 0–1)
BUN SERPL-MCNC: 14 MG/DL (ref 5–25)
CA-I BLD-SCNC: 1.11 MMOL/L (ref 1.12–1.32)
CALCIUM SERPL-MCNC: 8.2 MG/DL (ref 8.4–10.2)
CHLORIDE SERPL-SCNC: 107 MMOL/L (ref 96–108)
CO2 SERPL-SCNC: 25 MMOL/L (ref 21–32)
CREAT SERPL-MCNC: 0.58 MG/DL (ref 0.6–1.3)
EOSINOPHIL # BLD AUTO: 0.21 THOUSAND/ΜL (ref 0–0.61)
EOSINOPHIL NFR BLD AUTO: 2 % (ref 0–6)
ERYTHROCYTE [DISTWIDTH] IN BLOOD BY AUTOMATED COUNT: 19.8 % (ref 11.6–15.1)
GFR SERPL CREATININE-BSD FRML MDRD: 130 ML/MIN/1.73SQ M
GLUCOSE SERPL-MCNC: 78 MG/DL (ref 65–140)
HCT VFR BLD AUTO: 33.5 % (ref 36.5–49.3)
HGB BLD-MCNC: 10.7 G/DL (ref 12–17)
IMM GRANULOCYTES # BLD AUTO: 0.21 THOUSAND/UL (ref 0–0.2)
IMM GRANULOCYTES NFR BLD AUTO: 2 % (ref 0–2)
LACTATE SERPL-SCNC: 0.8 MMOL/L (ref 0.5–2)
LYMPHOCYTES # BLD AUTO: 0.92 THOUSANDS/ΜL (ref 0.6–4.47)
LYMPHOCYTES NFR BLD AUTO: 8 % (ref 14–44)
MAGNESIUM SERPL-MCNC: 2.1 MG/DL (ref 1.9–2.7)
MCH RBC QN AUTO: 30.9 PG (ref 26.8–34.3)
MCHC RBC AUTO-ENTMCNC: 31.9 G/DL (ref 31.4–37.4)
MCV RBC AUTO: 97 FL (ref 82–98)
MONOCYTES # BLD AUTO: 0.47 THOUSAND/ΜL (ref 0.17–1.22)
MONOCYTES NFR BLD AUTO: 4 % (ref 4–12)
NEUTROPHILS # BLD AUTO: 10.26 THOUSANDS/ΜL (ref 1.85–7.62)
NEUTS SEG NFR BLD AUTO: 84 % (ref 43–75)
NRBC BLD AUTO-RTO: 0 /100 WBCS
PHOSPHATE SERPL-MCNC: 2.2 MG/DL (ref 2.7–4.5)
PLATELET # BLD AUTO: 219 THOUSANDS/UL (ref 149–390)
PMV BLD AUTO: 11 FL (ref 8.9–12.7)
POTASSIUM SERPL-SCNC: 4.4 MMOL/L (ref 3.5–5.3)
PROCALCITONIN SERPL-MCNC: 0.21 NG/ML
RBC # BLD AUTO: 3.46 MILLION/UL (ref 3.88–5.62)
SODIUM SERPL-SCNC: 135 MMOL/L (ref 135–147)
WBC # BLD AUTO: 12.12 THOUSAND/UL (ref 4.31–10.16)

## 2024-10-19 PROCEDURE — 83735 ASSAY OF MAGNESIUM: CPT | Performed by: PHYSICIAN ASSISTANT

## 2024-10-19 PROCEDURE — 94003 VENT MGMT INPAT SUBQ DAY: CPT

## 2024-10-19 PROCEDURE — 84145 PROCALCITONIN (PCT): CPT

## 2024-10-19 PROCEDURE — 94640 AIRWAY INHALATION TREATMENT: CPT

## 2024-10-19 PROCEDURE — 80048 BASIC METABOLIC PNL TOTAL CA: CPT | Performed by: PHYSICIAN ASSISTANT

## 2024-10-19 PROCEDURE — 94150 VITAL CAPACITY TEST: CPT

## 2024-10-19 PROCEDURE — 85025 COMPLETE CBC W/AUTO DIFF WBC: CPT | Performed by: PHYSICIAN ASSISTANT

## 2024-10-19 PROCEDURE — 82330 ASSAY OF CALCIUM: CPT | Performed by: PHYSICIAN ASSISTANT

## 2024-10-19 PROCEDURE — 83605 ASSAY OF LACTIC ACID: CPT

## 2024-10-19 PROCEDURE — 99232 SBSQ HOSP IP/OBS MODERATE 35: CPT | Performed by: INTERNAL MEDICINE

## 2024-10-19 PROCEDURE — 94760 N-INVAS EAR/PLS OXIMETRY 1: CPT

## 2024-10-19 PROCEDURE — 84100 ASSAY OF PHOSPHORUS: CPT | Performed by: PHYSICIAN ASSISTANT

## 2024-10-19 RX ORDER — ALBUMIN, HUMAN INJ 5% 5 %
12.5 SOLUTION INTRAVENOUS ONCE
Status: COMPLETED | OUTPATIENT
Start: 2024-10-19 | End: 2024-10-19

## 2024-10-19 RX ORDER — GUAIFENESIN 100 MG/5ML
400 SOLUTION ORAL EVERY 6 HOURS
Status: DISCONTINUED | OUTPATIENT
Start: 2024-10-19 | End: 2024-10-29

## 2024-10-19 RX ORDER — LORAZEPAM 2 MG/ML
2 INJECTION INTRAMUSCULAR ONCE
Status: COMPLETED | OUTPATIENT
Start: 2024-10-19 | End: 2024-10-19

## 2024-10-19 RX ORDER — CALCIUM GLUCONATE 20 MG/ML
2 INJECTION, SOLUTION INTRAVENOUS ONCE
Status: COMPLETED | OUTPATIENT
Start: 2024-10-19 | End: 2024-10-19

## 2024-10-19 RX ORDER — SODIUM CHLORIDE, SODIUM GLUCONATE, SODIUM ACETATE, POTASSIUM CHLORIDE, MAGNESIUM CHLORIDE, SODIUM PHOSPHATE, DIBASIC, AND POTASSIUM PHOSPHATE .53; .5; .37; .037; .03; .012; .00082 G/100ML; G/100ML; G/100ML; G/100ML; G/100ML; G/100ML; G/100ML
500 INJECTION, SOLUTION INTRAVENOUS ONCE
Status: COMPLETED | OUTPATIENT
Start: 2024-10-19 | End: 2024-10-19

## 2024-10-19 RX ORDER — LORAZEPAM 2 MG/ML
INJECTION INTRAMUSCULAR
Status: COMPLETED
Start: 2024-10-19 | End: 2024-10-19

## 2024-10-19 RX ORDER — SODIUM CHLORIDE, SODIUM GLUCONATE, SODIUM ACETATE, POTASSIUM CHLORIDE, MAGNESIUM CHLORIDE, SODIUM PHOSPHATE, DIBASIC, AND POTASSIUM PHOSPHATE .53; .5; .37; .037; .03; .012; .00082 G/100ML; G/100ML; G/100ML; G/100ML; G/100ML; G/100ML; G/100ML
500 INJECTION, SOLUTION INTRAVENOUS ONCE
Status: DISCONTINUED | OUTPATIENT
Start: 2024-10-19 | End: 2024-10-21

## 2024-10-19 RX ADMIN — APIXABAN 5 MG: 5 TABLET, FILM COATED ORAL at 18:06

## 2024-10-19 RX ADMIN — GABAPENTIN 100 MG: 250 SOLUTION ORAL at 22:46

## 2024-10-19 RX ADMIN — LORAZEPAM 2 MG: 2 INJECTION INTRAMUSCULAR at 02:32

## 2024-10-19 RX ADMIN — NOREPINEPHRINE BITARTRATE 2 MCG/MIN: 1 INJECTION, SOLUTION, CONCENTRATE INTRAVENOUS at 03:32

## 2024-10-19 RX ADMIN — SODIUM CHLORIDE, SODIUM GLUCONATE, SODIUM ACETATE, POTASSIUM CHLORIDE, MAGNESIUM CHLORIDE, SODIUM PHOSPHATE, DIBASIC, AND POTASSIUM PHOSPHATE 500 ML: .53; .5; .37; .037; .03; .012; .00082 INJECTION, SOLUTION INTRAVENOUS at 06:41

## 2024-10-19 RX ADMIN — OXYCODONE HYDROCHLORIDE 5 MG: 5 TABLET ORAL at 14:13

## 2024-10-19 RX ADMIN — OXYCODONE HYDROCHLORIDE 5 MG: 5 TABLET ORAL at 03:37

## 2024-10-19 RX ADMIN — LEVALBUTEROL HYDROCHLORIDE 1.25 MG: 1.25 SOLUTION RESPIRATORY (INHALATION) at 13:48

## 2024-10-19 RX ADMIN — DIAZEPAM 5 MG: 10 INJECTION, SOLUTION INTRAMUSCULAR; INTRAVENOUS at 23:23

## 2024-10-19 RX ADMIN — Medication 4 ML: at 19:32

## 2024-10-19 RX ADMIN — POTASSIUM & SODIUM PHOSPHATES POWDER PACK 280-160-250 MG 2 PACKET: 280-160-250 PACK at 06:40

## 2024-10-19 RX ADMIN — MIDODRINE HYDROCHLORIDE 15 MG: 5 TABLET ORAL at 21:01

## 2024-10-19 RX ADMIN — ALBUMIN (HUMAN) 12.5 G: 12.5 INJECTION, SOLUTION INTRAVENOUS at 00:24

## 2024-10-19 RX ADMIN — CALCIUM GLUCONATE 2 G: 20 INJECTION, SOLUTION INTRAVENOUS at 09:14

## 2024-10-19 RX ADMIN — LEVALBUTEROL HYDROCHLORIDE 1.25 MG: 1.25 SOLUTION RESPIRATORY (INHALATION) at 19:32

## 2024-10-19 RX ADMIN — GABAPENTIN 100 MG: 250 SOLUTION ORAL at 09:14

## 2024-10-19 RX ADMIN — OXYCODONE HYDROCHLORIDE 5 MG: 5 TABLET ORAL at 09:14

## 2024-10-19 RX ADMIN — GUAIFENESIN 400 MG: 200 SOLUTION ORAL at 22:46

## 2024-10-19 RX ADMIN — CHLORHEXIDINE GLUCONATE 15 ML: 1.2 RINSE ORAL at 21:01

## 2024-10-19 RX ADMIN — GUAIFENESIN 400 MG: 200 SOLUTION ORAL at 14:12

## 2024-10-19 RX ADMIN — Medication 4 ML: at 07:53

## 2024-10-19 RX ADMIN — DIAZEPAM 5 MG: 10 INJECTION, SOLUTION INTRAMUSCULAR; INTRAVENOUS at 14:12

## 2024-10-19 RX ADMIN — DIAZEPAM 5 MG: 10 INJECTION, SOLUTION INTRAMUSCULAR; INTRAVENOUS at 05:32

## 2024-10-19 RX ADMIN — LEVALBUTEROL HYDROCHLORIDE 1.25 MG: 1.25 SOLUTION RESPIRATORY (INHALATION) at 02:38

## 2024-10-19 RX ADMIN — GABAPENTIN 100 MG: 250 SOLUTION ORAL at 18:06

## 2024-10-19 RX ADMIN — MIDODRINE HYDROCHLORIDE 15 MG: 5 TABLET ORAL at 05:32

## 2024-10-19 RX ADMIN — Medication 4 ML: at 02:39

## 2024-10-19 RX ADMIN — GUAIFENESIN 400 MG: 200 SOLUTION ORAL at 18:06

## 2024-10-19 RX ADMIN — LEVALBUTEROL HYDROCHLORIDE 1.25 MG: 1.25 SOLUTION RESPIRATORY (INHALATION) at 07:53

## 2024-10-19 RX ADMIN — MIDODRINE HYDROCHLORIDE 15 MG: 5 TABLET ORAL at 14:12

## 2024-10-19 RX ADMIN — Medication 3 MG: at 21:01

## 2024-10-19 RX ADMIN — SODIUM CHLORIDE, SODIUM GLUCONATE, SODIUM ACETATE, POTASSIUM CHLORIDE, MAGNESIUM CHLORIDE, SODIUM PHOSPHATE, DIBASIC, AND POTASSIUM PHOSPHATE 500 ML: .53; .5; .37; .037; .03; .012; .00082 INJECTION, SOLUTION INTRAVENOUS at 02:37

## 2024-10-19 RX ADMIN — OXYCODONE HYDROCHLORIDE 5 MG: 5 TABLET ORAL at 19:31

## 2024-10-19 RX ADMIN — LORAZEPAM 2 MG: 2 INJECTION INTRAMUSCULAR; INTRAVENOUS at 19:17

## 2024-10-19 RX ADMIN — Medication 4 ML: at 13:48

## 2024-10-19 RX ADMIN — APIXABAN 5 MG: 5 TABLET, FILM COATED ORAL at 09:14

## 2024-10-19 RX ADMIN — CHLORHEXIDINE GLUCONATE 15 ML: 1.2 RINSE ORAL at 09:14

## 2024-10-19 RX ADMIN — LORAZEPAM 2 MG: 2 INJECTION INTRAMUSCULAR; INTRAVENOUS at 02:32

## 2024-10-19 NOTE — PROGRESS NOTES
Progress Note - Critical Care/ICU   Name: Hemanth Swanson 39 y.o. male I MRN: [id number]  Unit/Bed#: ICU 05 I Date of Admission: 10/5/2024   Date of Service: 10/19/2024 I Hospital Day: 14      Assessment & Plan  Toxic metabolic encephalopathy  Intermittent periods of agitation, restless however alert oriented x 4 without loss of consciousness over the past few days  [] MRI on 10/16 with hypoxic brain injury findings  Goals of care discussions were currently continues with disease treatment palliative care has been follow now signed off as goals of care are clear.  Neurology following recommendations to repeat paraneoplastic workup and an MRI in about a month    Plan  Continue monitor mental status  Per family patient is at baseline events has been persistent over the past year  Regards to restless agitation bearing-down has responded to gabapentin and Valium on this admission we will continue for now  Continue monitoring metabolic derangement  Completed 10-day course of antibiotics for Pseudomonas and Proteus mirabilis pneumonia  Chronic respiratory failure with hypoxia and hypercapnia (HCC)  Neuromuscular disease post chemotherapyS/P tracheostomy in 9/2023.  He continues to fail SBT likely in the setting of diaphragmatic weakness in the setting of progressive demyelinating/ neuromuscular disease  Goals of care discussions continues disease driven    Plan:  Continue vent support  Xopnex and hypertonic saline nebs 3 times daily  Bradycardia  Intermittent bradycardia not related to hypoxia.  He has been maintaining heart rates above 60 since medication adjustments.  Seroquel discontinue  Heart rate thus far has been low 80s high 90s.  Sepsis (HCC)  Sepsis likely secondary to multifocal pneumonia in the setting of Proteus and Pseudomonas day 10 on antibiotics today for completion.   Now shock undifferentiated consider hypovolemic in the setting of insensible losses, echocardiogram has been remained unremarkable doubt  sepsis at this time given adequate treatment on antibiotics over the past 10 days  Overnight restarted again on pressors consider hypovolemic as initial treatment  Will consider POCUS disease continuing evaluate IVC urine output has slowed down  Plan  Trial of 500 cc Isolyte today will repeat on the afternoon will monitor urinary output  Continue Levophed for now      Seizure-like activity (HCC)  No evidence of seizure on EMU on multiple admissions including current one  Seizure-like activity is likely stereotypical behaviors likely related to agitation that had been responded to low-dose diazepam  Umbilical hernia without obstruction and without gangrene  Evaluated by surgery.  Found to not be a surgical candidate due to ongoing comorbidities.  Fat containing incarcerated hernia without bowel.  Palliative care by specialist  Palliative care is following.  Had long discussion with family about goals of care.  They would like to continue all measures.  The are willing to care for him at home.  They reiterated their wishes that he would not like to be sent to a rehab facility.  Goals of care, counseling/discussion  Goals of care discussions multiple admissions including current 1 palliative care follow-up continues with disease driven treatments  Discussed with mother, sister patient unfortunately continues with poor prognosis  Patient has 24/7 support at home  Case management following assistance upon discharge  Abnormal MRI  Noted with abnormal MRI concerning for hypoxic injury  Neurology on board recommendations to repeat paraneoplastic workup repeat MRI in about a month    Disposition: Critical care    ICU Core Measures     Vented Patient  VAP Bundle  VAP bundle ordered     A: Assess, Prevent, and Manage Pain Has pain been assessed? Yes  Need for changes to pain regimen? No   B: Both Spontaneous Awakening Trials (SATs) and Spontaneous Breathing Trials (SBTs) Plan to perform spontaneous awakening trial today? Yes    Plan to perform spontaneous breathing trial today? Yes   Obvious barriers to extubation? Yes   C: Choice of Sedation RASS Goal: 0 Alert and Calm  Need for changes to sedation or analgesia regimen? NA   D: Delirium CAM-ICU: Negative   E: Early Mobility  Plan for early mobility? Yes   F: Family Engagement Plan for family engagement today? Yes       Review of Invasive Devices:    [place] Plan: Continue for accurate I/O monitoring for 48 hours  Central access plan: Medications requiring central line      Prophylaxis:  VTE VTE covered by:  apixaban, Per PEG Tube, 5 mg at 10/18/24 1645       Stress Ulcer  not ordered         24 Hour Events : No acute overnight events patient noted hypothermic, bradycardic and hypotensive started on pressure current medications Levophed low-dose.  Had a brief episode of agitation with associated peak pressure and loss of volumes on the ventilator with base external bagging with improvement in symptoms briefly.  This morning patient denies any acute complaint.  Open yes and no questions.  Subjective   Review of Systems: See HPI for Review of Systems    Objective :                   Vitals I/O      Most Recent Min/Max in 24hrs   Temp 99.1 °F (37.3 °C) Temp  Min: 93.2 °F (34 °C)  Max: 99.1 °F (37.3 °C)   Pulse 73 Pulse  Min: 44  Max: 91   Resp 16 Resp  Min: 16  Max: 19   /66 BP  Min: 77/46  Max: 160/85   O2 Sat 96 % SpO2  Min: 90 %  Max: 100 %      Intake/Output Summary (Last 24 hours) at 10/19/2024 0724  Last data filed at 10/19/2024 0600  Gross per 24 hour   Intake 3835.42 ml   Output 535 ml   Net 3300.42 ml       Diet Enteral/Parenteral; Tube Feeding No Oral Diet; Jevity 1.5; Continuous; 70; 200; Water; Every 4 hours    Invasive Monitoring           Physical Exam   Physical Exam  Vitals and nursing note reviewed.   Eyes:      Extraocular Movements: Extraocular movements intact.      Pupils: Pupils are equal, round, and reactive to light.   Skin:     General: Skin is warm and dry.    HENT:      Head: Normocephalic and atraumatic.      Right Ear: Hearing normal.      Left Ear: Hearing normal.      Nose: No congestion.      Mouth/Throat:      Mouth: Mucous membranes are dry.   Neck:      Trachea: Tracheostomy present.   Cardiovascular:      Rate and Rhythm: Normal rate.      Pulses: Decreased pulses.   Musculoskeletal:         General: Swelling present.      Right lower leg: Trace Edema present.      Left lower leg: Trace Edema present.   Abdominal:      Palpations: Abdomen is soft.      Comments: PEG tube in place   Constitutional:       General: He is not in acute distress.     Appearance: He is well-developed and well-nourished. He is ill-appearing. He is not toxic-appearing.      Interventions: He is intubated.      Comments: Chronically ill-appearing  Trach in place   Pulmonary:      Effort: He is intubated.      Comments: Mechanically assisted ventilation.  [place]hon[place]  Neurological:      Mental Status: He is alert and oriented to person, place and time. He is calm.      Comments: Right upper extremity bilateral restraints in place for safety.  Spontaneous movement of extremities despite limitation.   Genitourinary/Anorectal:  [place] present.        Diagnostic Studies        Lab Results: I have reviewed the following results:     Medications:  Scheduled PRN   apixaban, 5 mg, BID  calcium gluconate, 2 g, Once  chlorhexidine, 15 mL, Q12H ISAEL  diazepam, 5 mg, Q8H ISAEL  gabapentin, 100 mg, TID  levalbuterol, 1.25 mg, Q6H  melatonin, 3 mg, HS  midodrine, 15 mg, Q8H ISAEL  multi-electrolyte, 500 mL, Once  oxyCODONE, 5 mg, Q6H  sodium chloride, 4 mL, Q6H      acetaminophen, 650 mg, Q6H PRN  bisacodyl, 10 mg, Daily PRN  midazolam, 2 mg, Q4H PRN       Continuous    norepinephrine, 1-30 mcg/min, Last Rate: Stopped (10/19/24 0651)         Labs:   CBC    Recent Labs     10/18/24  0520 10/19/24  0330   WBC 9.14 12.12*   HGB 11.0* 10.7*   HCT 35.4* 33.5*    219     BMP    Recent Labs     10/18/24  0520  10/19/24  0330   SODIUM 140 135   K 3.9 4.4    107   CO2 28 25   AGAP 4 3*   BUN 15 14   CREATININE 0.57* 0.58*   CALCIUM 8.2* 8.2*       Coags    No recent results     Additional Electrolytes  Recent Labs     10/18/24  0520 10/19/24  0330   MG 2.3 2.1   PHOS 2.6* 2.2*   CAIONIZED 1.12 1.11*          Blood Gas    No recent results  No recent results LFTs  No recent results    Infectious  Recent Labs     10/19/24  0330   PROCALCITONI 0.21     Glucose  Recent Labs     10/18/24  0520 10/19/24  0330   GLUC 113 78

## 2024-10-19 NOTE — ASSESSMENT & PLAN NOTE
Sepsis likely secondary to multifocal pneumonia in the setting of Proteus and Pseudomonas day 10 on antibiotics today for completion.   Now shock undifferentiated consider hypovolemic in the setting of insensible losses, echocardiogram has been remained unremarkable doubt sepsis at this time given adequate treatment on antibiotics over the past 10 days  Overnight restarted again on pressors consider hypovolemic as initial treatment  Will consider POCUS disease continuing evaluate IVC urine output has slowed down  Plan  Trial of 500 cc Isolyte today will repeat on the afternoon will monitor urinary output  Continue Levophed for now

## 2024-10-19 NOTE — RESPIRATORY THERAPY NOTE
10/19/24 1125   Respiratory Assessment   Resp Comments called to room upon arrival pt was off the vent and being bagged. RT took over bagging until spo2 improved and placed pt back on vent on documented settings   Vent Information   Vent    Vent type     Vent Mode AC/VC   $ Vital Capacity Mech/Peak Flow Yes   $ Pulse Oximetry Spot Check Charge Completed   AC/VC Settings   Resp Rate (BPM) 14 BPM   Vt (mL) 500 mL   FIO2 (%) 50 %   PEEP (cmH2O) 10 cmH2O   Flow Pattern (LPM) 60 L/min   Trigger Sensitivity Flow (lpm) 3 %   Humidification Heater   Heater Temperature (Set) 98.6 °F (37 °C)   AC/VC Actuals   Resp Rate (BPM) 13 BPM   VT (mL) 499   MV 6.1   MAP (cmH2O) 14 cmH2O   Peak Pressure (cmH2O) 40 cmH2O   I/E Ratio (Obs) 1:7.6   Heater Temperature (Obs) 98.6 °F (37 °C)   Static Compliance (mL/cmH20) 43 mL/cmH2O  (RESISTANCE 15)   Plateau Pressure (cm H2O) 24 cm H2O   AC/VC Alarms   High Peak Pressure (cmH2O) 45   High Resp Rate (BPM) 40 BPM   High MV (L/min) 20 L/min   Low MV (L/min) 4 L/min   Vt High (mL) 900 mL   Vt Low (mL) 300 mL   AC/VC Apnea Settings   Resp Rate (BPM) 14 BPM   VT (mL) 500 mL   FIO2 (%) 100 %   Apnea Time (s) 20 S   Apnea Flow (L/min) 60 L/min   Maintenance   Alarm (pink) cable attached Yes   Resuscitation bag with peep valve at bedside Yes   Water bag changed No   Circuit changed No

## 2024-10-19 NOTE — RESPIRATORY THERAPY NOTE
RT Ventilator Management Note  Hemanth Swanson 39 y.o. male MRN: [id number]  Unit/Bed#: ICU 05 Encounter: [id number]      Daily Screen         10/18/2024  0723 10/19/2024  0823          Patient safety screen outcome:: Failed Failed      Not Ready for Weaning due to:: PEEP > 8cmH2O Underline problem not resolved;PEEP > 8cmH2O                Physical Exam:   Assessment Type: During-treatment  General Appearance: Awake, Eyes open/responds to stimulus  Respiratory Pattern: Assisted  Chest Assessment: Chest expansion symmetrical  Bilateral Breath Sounds: Diminished  Cough: None  Suction: Trach  O2 Device: vent      Resp Comments: trach care done at this time; vent settings changed back to pt baseline;peep weaned based on spo2. CC provider aware      10/19/24 0842   Vent Information   Vent    Vent type     Vent Mode AC/VC   $ Vital Capacity Mech/Peak Flow Yes   $ Pulse Oximetry Spot Check Charge Completed   AC/VC Settings   Resp Rate (BPM) (S)  14 BPM   Vt (mL) (S)  500 mL   FIO2 (%) 50 %   PEEP (cmH2O) (S)  10 cmH2O   Flow Pattern (LPM) 60 L/min   Trigger Sensitivity Flow (lpm) 3 %   Humidification Heater   Heater Temperature (Set) 98.6 °F (37 °C)   AC/VC Actuals   Resp Rate (BPM) 13 BPM   VT (mL) 513   MV 6.8   MAP (cmH2O) 15 cmH2O   Peak Pressure (cmH2O) 32 cmH2O   I/E Ratio (Obs) 1:3.8   Heater Temperature (Obs) 98.6 °F (37 °C)   Static Compliance (mL/cmH20) 40 mL/cmH2O   Plateau Pressure (cm H2O) 26 cm H2O   AC/VC Alarms   High Peak Pressure (cmH2O) 45   High Resp Rate (BPM) 40 BPM   High MV (L/min) 20 L/min   Low MV (L/min) 4 L/min   Vt High (mL) 900 mL   Vt Low (mL) 300 mL   AC/VC Apnea Settings   Resp Rate (BPM) 14 BPM   VT (mL) 500 mL   FIO2 (%) 100 %   Apnea Time (s) 20 S   Apnea Flow (L/min) 60 L/min   Maintenance   Alarm (pink) cable attached Yes   Resuscitation bag with peep valve at bedside Yes   Water bag changed No   Circuit changed No

## 2024-10-19 NOTE — QUICK NOTE
MRI brain w/wo contrast in one month (approx 11/16/24) ordered with results to be Cc'ed to his outpatient neurologist Dr. Dc. Scheduling team was messaged by An AMBER Wang on 10/18 for follow up neurology appointment.    When paraneoplastic panel results and/or if patient remains inpatient for at least an additional month, please reach to neurology team to review.     Per neurohospitalist Dr. Geiger, d/w'ed Dr. Sams - reasonable to consider MRI c-spine w/wo contrast, given c/f bilateral diaphragm paralysis and evaluate for any structural abnormalities that could be impacting phrenic nerves. If obtained, please reach out to us if further input needed.    All other recommendations per note by An AMBER Wang on 10/18/24.    No further inpatient neurology recommendations at this time. Thank you for allowing us to be a part of his care. We will sign off at this time. Please let us know if there are any acute questions or concerns.

## 2024-10-19 NOTE — ASSESSMENT & PLAN NOTE
Neuromuscular disease post chemotherapyS/P tracheostomy in 9/2023.  He continues to fail SBT likely in the setting of diaphragmatic weakness in the setting of progressive demyelinating/ neuromuscular disease  Goals of care discussions continues disease driven    Plan:  Continue vent support  Xopnex and hypertonic saline nebs 3 times daily

## 2024-10-19 NOTE — ASSESSMENT & PLAN NOTE
Intermittent bradycardia not related to hypoxia.  He has been maintaining heart rates above 60 since medication adjustments.  Seroquel discontinue  Heart rate thus far has been low 80s high 90s.

## 2024-10-19 NOTE — RESPIRATORY THERAPY NOTE
Pt had to be bagged again at this time due to spo2 in the 60s. Bagged pt until spo2 improved and connected back to vent.

## 2024-10-19 NOTE — ASSESSMENT & PLAN NOTE
Noted with abnormal MRI concerning for hypoxic injury  Neurology on board recommendations to repeat paraneoplastic workup repeat MRI in about a month

## 2024-10-19 NOTE — RESPIRATORY THERAPY NOTE
Pt was bagged again at this time spo2 was in the 60s. Bagged until spo2 improved and placed back on vent

## 2024-10-19 NOTE — ASSESSMENT & PLAN NOTE
No evidence of seizure on EMU on multiple admissions including current one  Seizure-like activity is likely stereotypical behaviors likely related to agitation that had been responded to low-dose diazepam

## 2024-10-19 NOTE — CASE MANAGEMENT
Case Management Progress Note    Patient name Hemanth Swanson  Location ICU 05/ICU 05 MRN [id number]  : 1985 Date 10/19/2024       LOS (days): 14  Geometric Mean LOS (GMLOS) (days):   Days to GMLOS:        OBJECTIVE:        Current admission status: Inpatient  Preferred Pharmacy:   [facility]/pharmacy #[id number] - [address] [address]  Phone: [phone] Fax: [phone]    Primary Care Provider: Ela Douglas MD    [facility] Insurance: [facility]  Secondary Insurance:     PROGRESS NOTE:    Cm advised Ventilator order in patients folder. CM called Charley from [employer] and confirmed she wants order emailed to her at [email or web address]. Cm emailed order to her. Cm to follow for dc planning.

## 2024-10-19 NOTE — ASSESSMENT & PLAN NOTE
Intermittent periods of agitation, restless however alert oriented x 4 without loss of consciousness over the past few days  [] MRI on 10/16 with hypoxic brain injury findings  Goals of care discussions were currently continues with disease treatment palliative care has been follow now signed off as goals of care are clear.  Neurology following recommendations to repeat paraneoplastic workup and an MRI in about a month    Plan  Continue monitor mental status  Per family patient is at baseline events has been persistent over the past year  Regards to restless agitation bearing-down has responded to gabapentin and Valium on this admission we will continue for now  Continue monitoring metabolic derangement  Completed 10-day course of antibiotics for Pseudomonas and Proteus mirabilis pneumonia

## 2024-10-19 NOTE — RESPIRATORY THERAPY NOTE
10/19/24 1705   Respiratory Assessment   Resp Comments pt desat to 70s pt bagged until spo2 improved. pt connected back to vent   Vent Information   Vent    Vent type     Vent Mode AC/VC   $ Vital Capacity Mech/Peak Flow Yes   $ Pulse Oximetry Spot Check Charge Completed   AC/VC Settings   Resp Rate (BPM) 14 BPM   Vt (mL) 500 mL   FIO2 (%) 50 %   PEEP (cmH2O) 10 cmH2O   Flow Pattern (LPM) 60 L/min   Trigger Sensitivity Flow (lpm) 3 %   Humidification Heater   Heater Temperature (Set) 98.6 °F (37 °C)   AC/VC Actuals   Resp Rate (BPM) 14 BPM   VT (mL) 483   MV 6.6   MAP (cmH2O) 15 cmH2O   Peak Pressure (cmH2O) 28 cmH2O   I/E Ratio (Obs) 1:3.8   Heater Temperature (Obs) 98.6 °F (37 °C)   Static Compliance (mL/cmH20) 37 mL/cmH2O   Plateau Pressure (cm H2O) 24 cm H2O   AC/VC Alarms   High Peak Pressure (cmH2O) 45   High Resp Rate (BPM) 40 BPM   High MV (L/min) 20 L/min   Low MV (L/min) 4 L/min   Vt High (mL) 900 mL   Vt Low (mL) 300 mL   AC/VC Apnea Settings   Resp Rate (BPM) 14 BPM   VT (mL) 500 mL   FIO2 (%) 100 %   Apnea Time (s) 20 S   Apnea Flow (L/min) 60 L/min   Maintenance   Alarm (pink) cable attached Yes   Resuscitation bag with peep valve at bedside Yes   Water bag changed No   Circuit changed No   Surgical Airway Distal;Cuffed;Other (Comment)   Placement Date/Time: 06/01/24 1155   Tube Size: (c) 8  Placed By: Physician  Placed by (Name): Dr. Ronquillo  Type: Tracheostomy  Style: (c) Distal;Cuffed;Other (Comment)   Status Cuff Inflated;Secured   Site Assessment Clean;Dry   Surgical Airway Cuff Pressure (cm H20) 22 cm H2O   Equipment at bedside BVM;Wall Suction setup;Additional complete same size trach tube;Additional complete one size smaller trach tube;Obturator;Sterile saline;Additional inner cannula

## 2024-10-19 NOTE — PLAN OF CARE
Problem: Prexisting or High Potential for Compromised Skin Integrity  Goal: Skin integrity is maintained or improved  Description: INTERVENTIONS:  - Identify patients at risk for skin breakdown  - Assess and monitor skin integrity  - Assess and monitor nutrition and hydration status  - Monitor labs   - Assess for incontinence   - Turn and reposition patient  - Assist with mobility/ambulation  - Relieve pressure over bony prominences  - Avoid friction and shearing  - Provide appropriate hygiene as needed including keeping skin clean and dry  - Evaluate need for skin moisturizer/barrier cream  - Collaborate with interdisciplinary team   - Patient/family teaching  - Consider wound care consult   Outcome: Progressing     Problem: PAIN - ADULT  Goal: Verbalizes/displays adequate comfort level or baseline comfort level  Description: Interventions:  - Encourage patient to monitor pain and request assistance  - Assess pain using appropriate pain scale  - Administer analgesics based on type and severity of pain and evaluate response  - Implement non-pharmacological measures as appropriate and evaluate response  - Consider cultural and social influences on pain and pain management  - Notify physician/advanced practitioner if interventions unsuccessful or patient reports new pain  Outcome: Progressing     Problem: INFECTION - ADULT  Goal: Absence or prevention of progression during hospitalization  Description: INTERVENTIONS:  - Assess and monitor for signs and symptoms of infection  - Monitor lab/diagnostic results  - Monitor all insertion sites, i.e. indwelling lines, tubes, and drains  - Monitor endotracheal if appropriate and nasal secretions for changes in amount and color  - [place] appropriate cooling/warming therapies per order  - Administer medications as ordered  - Instruct and encourage patient and family to use good hand hygiene technique  - Identify and instruct in appropriate isolation precautions for  identified infection/condition  Outcome: Progressing  Goal: Absence of fever/infection during neutropenic period  Description: INTERVENTIONS:  - Monitor WBC    Outcome: Progressing     Problem: SAFETY ADULT  Goal: Patient will remain free of falls  Description: INTERVENTIONS:  - Educate patient/family on patient safety including physical limitations  - Instruct patient to call for assistance with activity   - Consult OT/PT to assist with strengthening/mobility   - Keep Call bell within reach  - Keep bed low and locked with side rails adjusted as appropriate  - Keep care items and personal belongings within reach  - Initiate and maintain comfort rounds  - Make Fall Risk Sign visible to staff  - Apply yellow socks and bracelet for high fall risk patients  - Consider moving patient to room near nurses station  Outcome: Progressing  Goal: Maintain or return to baseline ADL function  Description: INTERVENTIONS:  -  Assess patient's ability to carry out ADLs; assess patient's baseline for ADL function and identify physical deficits which impact ability to perform ADLs (bathing, care of mouth/teeth, toileting, grooming, dressing, etc.)  - Assess/evaluate cause of self-care deficits   - Assess range of motion  - Assess patient's mobility; develop plan if impaired  - Assess patient's need for assistive devices and provide as appropriate  - Encourage maximum independence but intervene and supervise when necessary  - Involve family in performance of ADLs  - Assess for home care needs following discharge   - Consider OT consult to assist with ADL evaluation and planning for discharge  - Provide patient education as appropriate  Outcome: Progressing  Goal: Maintains/Returns to pre admission functional level  Description: INTERVENTIONS:  - Perform AM-PAC 6 Click Basic Mobility/ Daily Activity assessment daily.  - Set and communicate daily mobility goal to care team and patient/family/caregiver.   - Collaborate with rehabilitation  services on mobility goals if consulted  - Out of bed for toileting  - Record patient progress and toleration of activity level   Outcome: Progressing     Problem: DISCHARGE PLANNING  Goal: Discharge to home or other facility with appropriate resources  Description: INTERVENTIONS:  - Identify barriers to discharge w/patient and caregiver  - Arrange for needed discharge resources and transportation as appropriate  - Identify discharge learning needs (meds, wound care, etc.)  - Arrange for interpretive services to assist at discharge as needed  - Refer to Case Management Department for coordinating discharge planning if the patient needs post-hospital services based on physician/advanced practitioner order or complex needs related to functional status, cognitive ability, or social support system  Outcome: Progressing     Problem: Knowledge Deficit  Goal: Patient/family/caregiver demonstrates understanding of disease process, treatment plan, medications, and discharge instructions  Description: Complete learning assessment and assess knowledge base.  Interventions:  - Provide teaching at level of understanding  - Provide teaching via preferred learning methods  Outcome: Progressing     Problem: SAFETY,RESTRAINT: NV/NON-SELF DESTRUCTIVE BEHAVIOR  Goal: Remains free of harm/injury (restraint for non violent/non self-detsructive behavior)  Description: INTERVENTIONS:  - Instruct patient/family regarding restraint use   - Assess and monitor physiologic and psychological status   - Provide interventions and comfort measures to meet assessed patient needs   - Identify and implement measures to help patient regain control  - Assess readiness for release of restraint   Outcome: Progressing  Goal: Returns to optimal restraint-free functioning  Description: INTERVENTIONS:  - Assess the patient's behavior and symptoms that indicate continued need for restraint  - Identify and implement measures to help patient regain control  -  Assess readiness for release of restraint   Outcome: Progressing     Problem: Nutrition/Hydration-ADULT  Goal: Nutrient/Hydration intake appropriate for improving, restoring or maintaining nutritional needs  Description: Monitor and assess patient's nutrition/hydration status for malnutrition. Collaborate with interdisciplinary team and initiate plan and interventions as ordered.  Monitor patient's weight and dietary intake as ordered or per policy. Utilize nutrition screening tool and intervene as necessary. Determine patient's food preferences and provide high-protein, high-caloric foods as appropriate.     INTERVENTIONS:  - Monitor oral intake, urinary output, labs, and treatment plans  - Assess nutrition and hydration status and recommend course of action  - Evaluate amount of meals eaten  - Assist patient with eating if necessary   - Allow adequate time for meals  - Recommend/ encourage appropriate diets, oral nutritional supplements, and vitamin/mineral supplements  - Order, calculate, and assess calorie counts as needed  - Recommend, monitor, and adjust tube feedings and TPN/PPN based on assessed needs  - Assess need for intravenous fluids  - Provide specific nutrition/hydration education as appropriate  - Include patient/family/caregiver in decisions related to nutrition  Outcome: Progressing

## 2024-10-19 NOTE — ASSESSMENT & PLAN NOTE
Goals of care discussions multiple admissions including current 1 palliative care follow-up continues with disease driven treatments  Discussed with mother, sister patient unfortunately continues with poor prognosis  Patient has 24/7 support at home  Case management following assistance upon discharge

## 2024-10-20 ENCOUNTER — APPOINTMENT (INPATIENT)
Dept: GASTROENTEROLOGY | Facility: HOSPITAL | Age: 39
DRG: 720 | End: 2024-10-20
Payer: COMMERCIAL

## 2024-10-20 LAB
ANION GAP SERPL CALCULATED.3IONS-SCNC: 3 MMOL/L (ref 4–13)
BASOPHILS # BLD AUTO: 0.02 THOUSANDS/ΜL (ref 0–0.1)
BASOPHILS NFR BLD AUTO: 0 % (ref 0–1)
BUN SERPL-MCNC: 12 MG/DL (ref 5–25)
CA-I BLD-SCNC: 1.16 MMOL/L (ref 1.12–1.32)
CALCIUM SERPL-MCNC: 8.3 MG/DL (ref 8.4–10.2)
CHLORIDE SERPL-SCNC: 103 MMOL/L (ref 96–108)
CO2 SERPL-SCNC: 29 MMOL/L (ref 21–32)
CREAT SERPL-MCNC: 0.5 MG/DL (ref 0.6–1.3)
EOSINOPHIL # BLD AUTO: 0.11 THOUSAND/ΜL (ref 0–0.61)
EOSINOPHIL NFR BLD AUTO: 1 % (ref 0–6)
ERYTHROCYTE [DISTWIDTH] IN BLOOD BY AUTOMATED COUNT: 19.9 % (ref 11.6–15.1)
GFR SERPL CREATININE-BSD FRML MDRD: 138 ML/MIN/1.73SQ M
GLUCOSE SERPL-MCNC: 107 MG/DL (ref 65–140)
HCT VFR BLD AUTO: 34 % (ref 36.5–49.3)
HGB BLD-MCNC: 10.5 G/DL (ref 12–17)
IMM GRANULOCYTES # BLD AUTO: 0.16 THOUSAND/UL (ref 0–0.2)
IMM GRANULOCYTES NFR BLD AUTO: 2 % (ref 0–2)
LYMPHOCYTES # BLD AUTO: 1.08 THOUSANDS/ΜL (ref 0.6–4.47)
LYMPHOCYTES NFR BLD AUTO: 12 % (ref 14–44)
MAGNESIUM SERPL-MCNC: 2 MG/DL (ref 1.9–2.7)
MCH RBC QN AUTO: 30.9 PG (ref 26.8–34.3)
MCHC RBC AUTO-ENTMCNC: 30.9 G/DL (ref 31.4–37.4)
MCV RBC AUTO: 100 FL (ref 82–98)
MONOCYTES # BLD AUTO: 0.45 THOUSAND/ΜL (ref 0.17–1.22)
MONOCYTES NFR BLD AUTO: 5 % (ref 4–12)
NEUTROPHILS # BLD AUTO: 7.57 THOUSANDS/ΜL (ref 1.85–7.62)
NEUTS SEG NFR BLD AUTO: 80 % (ref 43–75)
NRBC BLD AUTO-RTO: 0 /100 WBCS
PHOSPHATE SERPL-MCNC: 3 MG/DL (ref 2.7–4.5)
PLATELET # BLD AUTO: 200 THOUSANDS/UL (ref 149–390)
PMV BLD AUTO: 10.6 FL (ref 8.9–12.7)
POTASSIUM SERPL-SCNC: 3.9 MMOL/L (ref 3.5–5.3)
RBC # BLD AUTO: 3.4 MILLION/UL (ref 3.88–5.62)
SODIUM SERPL-SCNC: 135 MMOL/L (ref 135–147)
WBC # BLD AUTO: 9.39 THOUSAND/UL (ref 4.31–10.16)

## 2024-10-20 PROCEDURE — 84100 ASSAY OF PHOSPHORUS: CPT | Performed by: PHYSICIAN ASSISTANT

## 2024-10-20 PROCEDURE — 31645 BRNCHSC W/THER ASPIR 1ST: CPT | Performed by: INTERNAL MEDICINE

## 2024-10-20 PROCEDURE — 0B978ZZ DRAINAGE OF LEFT MAIN BRONCHUS, VIA NATURAL OR ARTIFICIAL OPENING ENDOSCOPIC: ICD-10-PCS | Performed by: INTERNAL MEDICINE

## 2024-10-20 PROCEDURE — 94150 VITAL CAPACITY TEST: CPT

## 2024-10-20 PROCEDURE — 83735 ASSAY OF MAGNESIUM: CPT | Performed by: PHYSICIAN ASSISTANT

## 2024-10-20 PROCEDURE — 0B968ZZ DRAINAGE OF RIGHT LOWER LOBE BRONCHUS, VIA NATURAL OR ARTIFICIAL OPENING ENDOSCOPIC: ICD-10-PCS | Performed by: INTERNAL MEDICINE

## 2024-10-20 PROCEDURE — 80048 BASIC METABOLIC PNL TOTAL CA: CPT | Performed by: PHYSICIAN ASSISTANT

## 2024-10-20 PROCEDURE — 94760 N-INVAS EAR/PLS OXIMETRY 1: CPT

## 2024-10-20 PROCEDURE — 99232 SBSQ HOSP IP/OBS MODERATE 35: CPT | Performed by: INTERNAL MEDICINE

## 2024-10-20 PROCEDURE — 0B9B8ZZ DRAINAGE OF LEFT LOWER LOBE BRONCHUS, VIA NATURAL OR ARTIFICIAL OPENING ENDOSCOPIC: ICD-10-PCS | Performed by: INTERNAL MEDICINE

## 2024-10-20 PROCEDURE — 82330 ASSAY OF CALCIUM: CPT | Performed by: PHYSICIAN ASSISTANT

## 2024-10-20 PROCEDURE — 0B958ZZ DRAINAGE OF RIGHT MIDDLE LOBE BRONCHUS, VIA NATURAL OR ARTIFICIAL OPENING ENDOSCOPIC: ICD-10-PCS | Performed by: INTERNAL MEDICINE

## 2024-10-20 PROCEDURE — 0B988ZZ DRAINAGE OF LEFT UPPER LOBE BRONCHUS, VIA NATURAL OR ARTIFICIAL OPENING ENDOSCOPIC: ICD-10-PCS | Performed by: INTERNAL MEDICINE

## 2024-10-20 PROCEDURE — 94640 AIRWAY INHALATION TREATMENT: CPT

## 2024-10-20 PROCEDURE — 94003 VENT MGMT INPAT SUBQ DAY: CPT

## 2024-10-20 PROCEDURE — 0B948ZZ DRAINAGE OF RIGHT UPPER LOBE BRONCHUS, VIA NATURAL OR ARTIFICIAL OPENING ENDOSCOPIC: ICD-10-PCS | Performed by: INTERNAL MEDICINE

## 2024-10-20 PROCEDURE — 85025 COMPLETE CBC W/AUTO DIFF WBC: CPT | Performed by: PHYSICIAN ASSISTANT

## 2024-10-20 PROCEDURE — 0B938ZZ DRAINAGE OF RIGHT MAIN BRONCHUS, VIA NATURAL OR ARTIFICIAL OPENING ENDOSCOPIC: ICD-10-PCS | Performed by: INTERNAL MEDICINE

## 2024-10-20 RX ORDER — MIDAZOLAM HYDROCHLORIDE 2 MG/2ML
4 INJECTION, SOLUTION INTRAMUSCULAR; INTRAVENOUS ONCE AS NEEDED
Status: DISCONTINUED | OUTPATIENT
Start: 2024-10-20 | End: 2024-10-21

## 2024-10-20 RX ORDER — LIDOCAINE HYDROCHLORIDE 20 MG/ML
INJECTION, SOLUTION EPIDURAL; INFILTRATION; INTRACAUDAL; PERINEURAL
Status: COMPLETED
Start: 2024-10-20 | End: 2024-10-20

## 2024-10-20 RX ORDER — MIDODRINE HYDROCHLORIDE 5 MG/1
10 TABLET ORAL EVERY 12 HOURS
Status: DISCONTINUED | OUTPATIENT
Start: 2024-10-20 | End: 2024-10-20

## 2024-10-20 RX ORDER — FENTANYL CITRATE 50 UG/ML
100 INJECTION, SOLUTION INTRAMUSCULAR; INTRAVENOUS ONCE AS NEEDED
Status: DISCONTINUED | OUTPATIENT
Start: 2024-10-20 | End: 2024-10-21

## 2024-10-20 RX ORDER — LORAZEPAM 2 MG/ML
INJECTION INTRAMUSCULAR
Status: COMPLETED
Start: 2024-10-20 | End: 2024-10-20

## 2024-10-20 RX ORDER — FENTANYL CITRATE 50 UG/ML
INJECTION, SOLUTION INTRAMUSCULAR; INTRAVENOUS
Status: DISPENSED
Start: 2024-10-20 | End: 2024-10-21

## 2024-10-20 RX ORDER — LORAZEPAM 2 MG/ML
2 INJECTION INTRAMUSCULAR ONCE
Status: DISCONTINUED | OUTPATIENT
Start: 2024-10-20 | End: 2024-10-21

## 2024-10-20 RX ORDER — LORAZEPAM 2 MG/ML
2 INJECTION INTRAMUSCULAR EVERY 4 HOURS PRN
Status: DISCONTINUED | OUTPATIENT
Start: 2024-10-20 | End: 2024-10-29

## 2024-10-20 RX ORDER — MIDAZOLAM HYDROCHLORIDE 2 MG/2ML
INJECTION, SOLUTION INTRAMUSCULAR; INTRAVENOUS
Status: DISPENSED
Start: 2024-10-20 | End: 2024-10-21

## 2024-10-20 RX ORDER — MIDODRINE HYDROCHLORIDE 5 MG/1
10 TABLET ORAL EVERY 8 HOURS
Status: DISCONTINUED | OUTPATIENT
Start: 2024-10-20 | End: 2024-10-23

## 2024-10-20 RX ADMIN — OXYCODONE HYDROCHLORIDE 5 MG: 5 TABLET ORAL at 02:45

## 2024-10-20 RX ADMIN — Medication 4 ML: at 02:03

## 2024-10-20 RX ADMIN — OXYCODONE HYDROCHLORIDE 5 MG: 5 TABLET ORAL at 08:01

## 2024-10-20 RX ADMIN — LEVALBUTEROL HYDROCHLORIDE 1.25 MG: 1.25 SOLUTION RESPIRATORY (INHALATION) at 13:23

## 2024-10-20 RX ADMIN — CHLORHEXIDINE GLUCONATE 15 ML: 1.2 RINSE ORAL at 08:01

## 2024-10-20 RX ADMIN — Medication 3 MG: at 22:42

## 2024-10-20 RX ADMIN — LEVALBUTEROL HYDROCHLORIDE 1.25 MG: 1.25 SOLUTION RESPIRATORY (INHALATION) at 08:11

## 2024-10-20 RX ADMIN — GUAIFENESIN 400 MG: 200 SOLUTION ORAL at 17:54

## 2024-10-20 RX ADMIN — LEVALBUTEROL HYDROCHLORIDE 1.25 MG: 1.25 SOLUTION RESPIRATORY (INHALATION) at 02:03

## 2024-10-20 RX ADMIN — GABAPENTIN 100 MG: 250 SOLUTION ORAL at 17:54

## 2024-10-20 RX ADMIN — GUAIFENESIN 400 MG: 200 SOLUTION ORAL at 22:42

## 2024-10-20 RX ADMIN — LORAZEPAM 2 MG: 2 INJECTION INTRAMUSCULAR; INTRAVENOUS at 02:53

## 2024-10-20 RX ADMIN — MIDODRINE HYDROCHLORIDE 10 MG: 5 TABLET ORAL at 17:54

## 2024-10-20 RX ADMIN — LIDOCAINE HYDROCHLORIDE 100 MG: 20 INJECTION, SOLUTION EPIDURAL; INFILTRATION; INTRACAUDAL at 14:30

## 2024-10-20 RX ADMIN — MIDODRINE HYDROCHLORIDE 15 MG: 5 TABLET ORAL at 05:29

## 2024-10-20 RX ADMIN — OXYCODONE HYDROCHLORIDE 5 MG: 5 TABLET ORAL at 20:05

## 2024-10-20 RX ADMIN — DIAZEPAM 5 MG: 10 INJECTION, SOLUTION INTRAMUSCULAR; INTRAVENOUS at 14:30

## 2024-10-20 RX ADMIN — LEVALBUTEROL HYDROCHLORIDE 1.25 MG: 1.25 SOLUTION RESPIRATORY (INHALATION) at 19:27

## 2024-10-20 RX ADMIN — Medication 4 ML: at 08:11

## 2024-10-20 RX ADMIN — DIAZEPAM 5 MG: 10 INJECTION, SOLUTION INTRAMUSCULAR; INTRAVENOUS at 21:48

## 2024-10-20 RX ADMIN — DIAZEPAM 5 MG: 10 INJECTION, SOLUTION INTRAMUSCULAR; INTRAVENOUS at 05:29

## 2024-10-20 RX ADMIN — CHLORHEXIDINE GLUCONATE 15 ML: 1.2 RINSE ORAL at 20:05

## 2024-10-20 RX ADMIN — GABAPENTIN 100 MG: 250 SOLUTION ORAL at 20:05

## 2024-10-20 RX ADMIN — APIXABAN 5 MG: 5 TABLET, FILM COATED ORAL at 08:01

## 2024-10-20 RX ADMIN — Medication 4 ML: at 13:23

## 2024-10-20 RX ADMIN — LORAZEPAM 2 MG: 2 INJECTION INTRAMUSCULAR; INTRAVENOUS at 19:11

## 2024-10-20 RX ADMIN — GUAIFENESIN 400 MG: 200 SOLUTION ORAL at 14:06

## 2024-10-20 RX ADMIN — GABAPENTIN 100 MG: 250 SOLUTION ORAL at 08:02

## 2024-10-20 RX ADMIN — GUAIFENESIN 400 MG: 200 SOLUTION ORAL at 05:29

## 2024-10-20 RX ADMIN — OXYCODONE HYDROCHLORIDE 5 MG: 5 TABLET ORAL at 14:30

## 2024-10-20 RX ADMIN — APIXABAN 5 MG: 5 TABLET, FILM COATED ORAL at 17:54

## 2024-10-20 RX ADMIN — Medication 4 ML: at 19:27

## 2024-10-20 NOTE — ASSESSMENT & PLAN NOTE
Intermittent periods of agitation, restless however alert oriented x 4 without loss of consciousness over the past few days  MRI on 10/16 with hypoxic brain injury findings  Goals of care discussions were currently continues with disease treatment palliative care has been follow now signed off as goals of care are clear.  Neurology following recommendations to repeat paraneoplastic workup and an MRI in about a month    Plan  Continue monitor mental status  Per family patient is at baseline events has been persistent over the past year  Regards to restless agitation bearing-down has responded to gabapentin and Valium on this admission we will continue for now  Continue monitoring metabolic derangement  Completed 10-day course of antibiotics for Pseudomonas and Proteus mirabilis pneumonia

## 2024-10-20 NOTE — PROGRESS NOTES
Progress Note - Critical Care/ICU   Name: Hemanth Swanson 39 y.o. male I MRN: [id number]  Unit/Bed#: ICU 05 I Date of Admission: 10/5/2024   Date of Service: 10/20/2024 I Hospital Day: 15      Assessment & Plan  Toxic metabolic encephalopathy  Intermittent periods of agitation, restless however alert oriented x 4 without loss of consciousness over the past few days  MRI on 10/16 with hypoxic brain injury findings  Goals of care discussions were currently continues with disease treatment palliative care has been follow now signed off as goals of care are clear.  Neurology following recommendations to repeat paraneoplastic workup and an MRI in about a month    Plan  Continue monitor mental status  Per family patient is at baseline events has been persistent over the past year  Regards to restless agitation bearing-down has responded to gabapentin and Valium on this admission we will continue for now  Continue monitoring metabolic derangement  Completed 10-day course of antibiotics for Pseudomonas and Proteus mirabilis pneumonia  Chronic respiratory failure with hypoxia and hypercapnia (HCC)  Neuromuscular disease post chemotherapyS/P tracheostomy in 9/2023.  He continues to fail SBT likely in the setting of diaphragmatic weakness in the setting of progressive demyelinating/ neuromuscular disease  Goals of care discussions continues disease driven    Plan:  Continue vent support  Xopnex and hypertonic saline nebs 3 times daily  Bradycardia  Intermittent bradycardia not related to hypoxia.  He has been maintaining heart rates above 60 since medication adjustments.  Seroquel discontinue  Heart rate thus far has been low 80s high 90s.  Sepsis (HCC)  Sepsis likely secondary to multifocal pneumonia in the setting of Proteus and Pseudomonas day 10 on antibiotics today for completion.   Now shock undifferentiated consider hypovolemic in the setting of insensible losses, echocardiogram has been remained unremarkable doubt  sepsis at this time given adequate treatment on antibiotics over the past 10 days  Blood pressure remained with maps above 70 overnight.  He did not require pressors.  Will consider POCUS disease continuing evaluate IVC urine output has slowed down  Plan  If pressures are low, will trial of 500 cc Isolyte with monitoring urinary output.  Seizure-like activity (HCC)  No evidence of seizure on EMU on multiple admissions including current one  Seizure-like activity is likely stereotypical behaviors likely related to agitation that had been responded to low-dose diazepam  Umbilical hernia without obstruction and without gangrene  Evaluated by surgery.  Found to not be a surgical candidate due to ongoing comorbidities.  Fat containing incarcerated hernia without bowel.  Palliative care by specialist  Palliative care is following.  Had long discussion with family about goals of care.  They would like to continue all measures.  The are willing to care for him at home.  They reiterated their wishes that he would not like to be sent to a rehab facility.  Goals of care, counseling/discussion  Goals of care discussions multiple admissions including current 1 palliative care follow-up continues with disease driven treatments  Discussed with mother, sister patient unfortunately continues with poor prognosis  Patient has 24/7 support at home  Case management following assistance upon discharge  Abnormal MRI  Noted with abnormal MRI concerning for hypoxic injury  Neurology on board recommendations to repeat paraneoplastic workup repeat MRI in about a month    Disposition: Critical care    ICU Core Measures     Vented Patient  VAP Bundle  VAP bundle ordered     A: Assess, Prevent, and Manage Pain Has pain been assessed? Yes  Need for changes to pain regimen? No   B: Both Spontaneous Awakening Trials (SATs) and Spontaneous Breathing Trials (SBTs) Plan to perform spontaneous awakening trial today? Chronic vent   Plan to perform  spontaneous breathing trial today? Chronic vent   Obvious barriers to extubation? Yes   C: Choice of Sedation RASS Goal: 0 Alert and Calm  Need for changes to sedation or analgesia regimen? No   D: Delirium CAM-ICU: Negative   E: Early Mobility  Plan for early mobility? No   F: Family Engagement Plan for family engagement today? Yes       Review of Invasive Devices:    Sheppard Plan: Continue for accurate I/O monitoring for 48 hours  Central access plan: Medications requiring central line      Prophylaxis:  VTE VTE covered by:  apixaban, Per PEG Tube, 5 mg at 10/19/24 1806       Stress Ulcer  not ordered         24 Hour Events : Patient had increased agitation which resulted in the patient needing a PRN dosage of Ativan in addition to his scheduled Valium.  He also had 2 additional episodes of clenching down which required bagging off of the vent.  These episodes lasted around 1 to 2 minutes.  After receiving the as needed Ativan, he was relaxed and slept through the rest of the night.     Subjective   Review of Systems: See HPI for Review of Systems    Objective :                   Vitals I/O      Most Recent Min/Max in 24hrs   Temp 97.6 °F (36.4 °C) Temp  Min: 97.6 °F (36.4 °C)  Max: 99.9 °F (37.7 °C)   Pulse 61 Pulse  Min: 57  Max: 100   Resp 16 No data recorded   /61 BP  Min: 77/46  Max: 144/91   O2 Sat 97 % SpO2  Min: 90 %  Max: 99 %      Intake/Output Summary (Last 24 hours) at 10/20/2024 0104  Last data filed at 10/20/2024 0000  Gross per 24 hour   Intake 3428.94 ml   Output 1235 ml   Net 2193.94 ml       Diet Enteral/Parenteral; Tube Feeding No Oral Diet; Jevity 1.5; Continuous; 70; 200; Water; Every 4 hours    Invasive Monitoring           Physical Exam   Physical Exam  Vitals reviewed.   Eyes:      General: No scleral icterus.        Right eye: No discharge.         Left eye: No discharge.   Skin:     General: Skin is warm and dry.      Capillary Refill: Capillary refill takes less than 2 seconds.       Coloration: Skin is not jaundiced.   HENT:      Head: Normocephalic and atraumatic.      Mouth/Throat:      Mouth: Mucous membranes are moist.      Pharynx: Oropharyngeal exudate present.   Cardiovascular:      Rate and Rhythm: Normal rate and regular rhythm.      Heart sounds: Normal heart sounds.   Musculoskeletal:      Right lower le+ Edema present.      Left lower le+ Edema present.   Abdominal: General: There is no distension.      Palpations: Abdomen is soft.   Constitutional:       Appearance: He is well-developed and well-nourished. He is ill-appearing.      Interventions: He is sedated, intubated and restrained.   Pulmonary:      Effort: He is intubated.      Breath sounds: Rales present. No wheezing or rhonchi.   Neurological:      Mental Status: He is somnolent and agitated.   Genitourinary/Anorectal:  Sheppard present.        Diagnostic Studies        Lab Results: I have reviewed the following results:     Medications:  Scheduled PRN   apixaban, 5 mg, BID  chlorhexidine, 15 mL, Q12H ISAEL  diazepam, 5 mg, Q8H ISAEL  gabapentin, 100 mg, TID  guaiFENesin, 400 mg, Q6H  levalbuterol, 1.25 mg, Q6H  melatonin, 3 mg, HS  midodrine, 15 mg, Q8H ISAEL  multi-electrolyte, 500 mL, Once  oxyCODONE, 5 mg, Q6H  sodium chloride, 4 mL, Q6H      acetaminophen, 650 mg, Q6H PRN  bisacodyl, 10 mg, Daily PRN  midazolam, 2 mg, Q4H PRN       Continuous          Labs:   CBC    Recent Labs     10/18/24  0520 10/19/24  0330   WBC 9.14 12.12*   HGB 11.0* 10.7*   HCT 35.4* 33.5*    219     BMP    Recent Labs     10/18/24  0520 10/19/24  0330   SODIUM 140 135   K 3.9 4.4    107   CO2 28 25   AGAP 4 3*   BUN 15 14   CREATININE 0.57* 0.58*   CALCIUM 8.2* 8.2*       Coags    No recent results     Additional Electrolytes  Recent Labs     10/18/24  0520 10/19/24  0330   MG 2.3 2.1   PHOS 2.6* 2.2*   CAIONIZED 1.12 1.11*          Blood Gas    No recent results  No recent results LFTs  No recent results    Infectious  Recent  Labs     10/19/24  0330   PROCALCITONI 0.21     Glucose  Recent Labs     10/18/24  0520 10/19/24  0330   GLUC 113 78

## 2024-10-20 NOTE — PLAN OF CARE
Problem: Prexisting or High Potential for Compromised Skin Integrity  Goal: Skin integrity is maintained or improved  Description: INTERVENTIONS:  - Identify patients at risk for skin breakdown  - Assess and monitor skin integrity  - Assess and monitor nutrition and hydration status  - Monitor labs   - Assess for incontinence   - Turn and reposition patient  - Assist with mobility/ambulation  - Relieve pressure over bony prominences  - Avoid friction and shearing  - Provide appropriate hygiene as needed including keeping skin clean and dry  - Evaluate need for skin moisturizer/barrier cream  - Collaborate with interdisciplinary team   - Patient/family teaching  - Consider wound care consult   Outcome: Progressing     Problem: PAIN - ADULT  Goal: Verbalizes/displays adequate comfort level or baseline comfort level  Description: Interventions:  - Encourage patient to monitor pain and request assistance  - Assess pain using appropriate pain scale  - Administer analgesics based on type and severity of pain and evaluate response  - Implement non-pharmacological measures as appropriate and evaluate response  - Consider cultural and social influences on pain and pain management  - Notify physician/advanced practitioner if interventions unsuccessful or patient reports new pain  Outcome: Progressing     Problem: INFECTION - ADULT  Goal: Absence or prevention of progression during hospitalization  Description: INTERVENTIONS:  - Assess and monitor for signs and symptoms of infection  - Monitor lab/diagnostic results  - Monitor all insertion sites, i.e. indwelling lines, tubes, and drains  - Monitor endotracheal if appropriate and nasal secretions for changes in amount and color  - Stuart appropriate cooling/warming therapies per order  - Administer medications as ordered  - Instruct and encourage patient and family to use good hand hygiene technique  - Identify and instruct in appropriate isolation precautions for  identified infection/condition  Outcome: Progressing  Goal: Absence of fever/infection during neutropenic period  Description: INTERVENTIONS:  - Monitor WBC    Outcome: Progressing     Problem: SAFETY ADULT  Goal: Patient will remain free of falls  Description: INTERVENTIONS:  - Educate patient/family on patient safety including physical limitations  - Instruct patient to call for assistance with activity   - Consult OT/PT to assist with strengthening/mobility   - Keep Call bell within reach  - Keep bed low and locked with side rails adjusted as appropriate  - Keep care items and personal belongings within reach  - Initiate and maintain comfort rounds  - Make Fall Risk Sign visible to staff  - Apply yellow socks and bracelet for high fall risk patients  - Consider moving patient to room near nurses station  Outcome: Progressing  Goal: Maintain or return to baseline ADL function  Description: INTERVENTIONS:  -  Assess patient's ability to carry out ADLs; assess patient's baseline for ADL function and identify physical deficits which impact ability to perform ADLs (bathing, care of mouth/teeth, toileting, grooming, dressing, etc.)  - Assess/evaluate cause of self-care deficits   - Assess range of motion  - Assess patient's mobility; develop plan if impaired  - Assess patient's need for assistive devices and provide as appropriate  - Encourage maximum independence but intervene and supervise when necessary  - Involve family in performance of ADLs  - Assess for home care needs following discharge   - Consider OT consult to assist with ADL evaluation and planning for discharge  - Provide patient education as appropriate  Outcome: Progressing  Goal: Maintains/Returns to pre admission functional level  Description: INTERVENTIONS:  - Perform AM-PAC 6 Click Basic Mobility/ Daily Activity assessment daily.  - Set and communicate daily mobility goal to care team and patient/family/caregiver.   - Collaborate with rehabilitation  services on mobility goals if consulted  - Out of bed for toileting  - Record patient progress and toleration of activity level   Outcome: Progressing     Problem: DISCHARGE PLANNING  Goal: Discharge to home or other facility with appropriate resources  Description: INTERVENTIONS:  - Identify barriers to discharge w/patient and caregiver  - Arrange for needed discharge resources and transportation as appropriate  - Identify discharge learning needs (meds, wound care, etc.)  - Arrange for interpretive services to assist at discharge as needed  - Refer to Case Management Department for coordinating discharge planning if the patient needs post-hospital services based on physician/advanced practitioner order or complex needs related to functional status, cognitive ability, or social support system  Outcome: Progressing     Problem: Knowledge Deficit  Goal: Patient/family/caregiver demonstrates understanding of disease process, treatment plan, medications, and discharge instructions  Description: Complete learning assessment and assess knowledge base.  Interventions:  - Provide teaching at level of understanding  - Provide teaching via preferred learning methods  Outcome: Progressing     Problem: SAFETY,RESTRAINT: NV/NON-SELF DESTRUCTIVE BEHAVIOR  Goal: Remains free of harm/injury (restraint for non violent/non self-detsructive behavior)  Description: INTERVENTIONS:  - Instruct patient/family regarding restraint use   - Assess and monitor physiologic and psychological status   - Provide interventions and comfort measures to meet assessed patient needs   - Identify and implement measures to help patient regain control  - Assess readiness for release of restraint   Outcome: Progressing  Goal: Returns to optimal restraint-free functioning  Description: INTERVENTIONS:  - Assess the patient's behavior and symptoms that indicate continued need for restraint  - Identify and implement measures to help patient regain control  -  Assess readiness for release of restraint   Outcome: Progressing     Problem: Nutrition/Hydration-ADULT  Goal: Nutrient/Hydration intake appropriate for improving, restoring or maintaining nutritional needs  Description: Monitor and assess patient's nutrition/hydration status for malnutrition. Collaborate with interdisciplinary team and initiate plan and interventions as ordered.  Monitor patient's weight and dietary intake as ordered or per policy. Utilize nutrition screening tool and intervene as necessary. Determine patient's food preferences and provide high-protein, high-caloric foods as appropriate.     INTERVENTIONS:  - Monitor oral intake, urinary output, labs, and treatment plans  - Assess nutrition and hydration status and recommend course of action  - Evaluate amount of meals eaten  - Assist patient with eating if necessary   - Allow adequate time for meals  - Recommend/ encourage appropriate diets, oral nutritional supplements, and vitamin/mineral supplements  - Order, calculate, and assess calorie counts as needed  - Recommend, monitor, and adjust tube feedings and TPN/PPN based on assessed needs  - Assess need for intravenous fluids  - Provide specific nutrition/hydration education as appropriate  - Include patient/family/caregiver in decisions related to nutrition  Outcome: Progressing

## 2024-10-20 NOTE — RESPIRATORY THERAPY NOTE
RT Ventilator Management Note  Hemanth Swanson 39 y.o. male MRN: [id number]  Unit/Bed#: ICU 05 Encounter: [id number]      Daily Screen         10/19/2024  0823 10/20/2024  0811          Patient safety screen outcome:: Failed Failed      Not Ready for Weaning due to:: Underline problem not resolved;PEEP > 8cmH2O Underline problem not resolved                Physical Exam:   Assessment Type: During-treatment  General Appearance: Awake  Respiratory Pattern: Assisted  Chest Assessment: Chest expansion asymmetrical  Bilateral Breath Sounds: Diminished  Cough: None  Suction: Trach  O2 Device: vent      Resp Comments: received on docuemented vent settings, weaned peep to 8     10/20/24 0811   Respiratory Assessment   Assessment Type During-treatment   General Appearance Awake   Respiratory Pattern Assisted   Chest Assessment Chest expansion asymmetrical   Bilateral Breath Sounds Diminished   Cough None   Suction Trach   Resp Comments received on docuemented vent settings, weaned peep to 8   O2 Device vent   Vent Information   Vent    Vent type     Vent Mode AC/VC   $ Vital Capacity Mech/Peak Flow Yes   $ Pulse Oximetry Spot Check Charge Completed   AC/VC Settings   Resp Rate (BPM) 14 BPM   Vt (mL) 500 mL   FIO2 (%) 50 %   PEEP (cmH2O) (S)  8 cmH2O   Flow Pattern (LPM) 60 L/min   Trigger Sensitivity Flow (lpm) 3 %   Humidification Heater   Heater Temperature (Set) 98.6 °F (37 °C)   AC/VC Actuals   Resp Rate (BPM) 14 BPM   VT (mL) 537   MV 6.9   MAP (cmH2O) 13 cmH2O   Peak Pressure (cmH2O) 30 cmH2O   I/E Ratio (Obs) 1:3.8   Heater Temperature (Obs) 98.2 °F (36.8 °C)   Static Compliance (mL/cmH20) 37 mL/cmH2O  (resistance 14)   Plateau Pressure (cm H2O) 40 cm H2O   AC/VC Alarms   High Peak Pressure (cmH2O) 45   High Resp Rate (BPM) 40 BPM   High MV (L/min) 20 L/min   Low MV (L/min) 4 L/min   Vt High (mL) 900 mL   Vt Low (mL) 300 mL   AC/VC Apnea Settings   Resp Rate (BPM) 14 BPM   VT (mL) 500 mL   FIO2 (%) 100  %   Apnea Time (s) 20 S   Apnea Flow (L/min) 60 L/min   Maintenance   Alarm (pink) cable attached Yes   Resuscitation bag with peep valve at bedside Yes   Water bag changed No   Circuit changed No   Daily Screen   Patient safety screen outcome: Failed   Not Ready for Weaning due to: Underline problem not resolved   IHI Ventilator Associated Pneumonia Bundle   Daily Awakening Trials Performed Not applicable (Comment)   Daily Assessment of Readiness to Extubate Not applicable (Comment)   Head of Bed Elevated HOB 30   Surgical Airway Distal;Cuffed;Other (Comment)   Placement Date/Time: 06/01/24 1155   Tube Size: (c) 8  Placed By: Physician  Placed by (Name): Dr. Ronquillo  Type: Tracheostomy  Style: (c) Distal;Cuffed;Other (Comment)   Status Cuff Inflated;Secured   Site Assessment Clean;Dry   Site Care Cleansed;Protective barrier to skin   Inner Cannula Care Changed/new   Ties Assessment Secure;Intact   Surgical Airway Cuff Pressure (cm H20) 24 cm H2O   Equipment at bedside BVM;Wall Suction setup;Additional complete same size trach tube;Additional complete one size smaller trach tube;Obturator;Sterile saline;Additional inner cannula

## 2024-10-20 NOTE — ASSESSMENT & PLAN NOTE
Sepsis likely secondary to multifocal pneumonia in the setting of Proteus and Pseudomonas day 10 on antibiotics today for completion.   Now shock undifferentiated consider hypovolemic in the setting of insensible losses, echocardiogram has been remained unremarkable doubt sepsis at this time given adequate treatment on antibiotics over the past 10 days  Blood pressure remained with maps above 70 overnight.  He did not require pressors.  Will consider POCUS disease continuing evaluate IVC urine output has slowed down  Plan  If pressures are low, will trial of 500 cc Isolyte with monitoring urinary output.

## 2024-10-21 ENCOUNTER — APPOINTMENT (INPATIENT)
Dept: RADIOLOGY | Facility: HOSPITAL | Age: 39
DRG: 720 | End: 2024-10-21
Payer: COMMERCIAL

## 2024-10-21 ENCOUNTER — APPOINTMENT (INPATIENT)
Dept: GASTROENTEROLOGY | Facility: HOSPITAL | Age: 39
DRG: 720 | End: 2024-10-21
Attending: STUDENT IN AN ORGANIZED HEALTH CARE EDUCATION/TRAINING PROGRAM
Payer: COMMERCIAL

## 2024-10-21 LAB
ANION GAP SERPL CALCULATED.3IONS-SCNC: 4 MMOL/L (ref 4–13)
ATRIAL RATE: 54 BPM
BASOPHILS # BLD AUTO: 0.02 THOUSANDS/ΜL (ref 0–0.1)
BASOPHILS NFR BLD AUTO: 0 % (ref 0–1)
BUN SERPL-MCNC: 11 MG/DL (ref 5–25)
CA-I BLD-SCNC: 1.12 MMOL/L (ref 1.12–1.32)
CALCIUM SERPL-MCNC: 8.4 MG/DL (ref 8.4–10.2)
CHLORIDE SERPL-SCNC: 100 MMOL/L (ref 96–108)
CO2 SERPL-SCNC: 31 MMOL/L (ref 21–32)
CREAT SERPL-MCNC: 0.44 MG/DL (ref 0.6–1.3)
EOSINOPHIL # BLD AUTO: 0.08 THOUSAND/ΜL (ref 0–0.61)
EOSINOPHIL NFR BLD AUTO: 1 % (ref 0–6)
ERYTHROCYTE [DISTWIDTH] IN BLOOD BY AUTOMATED COUNT: 19.1 % (ref 11.6–15.1)
GFR SERPL CREATININE-BSD FRML MDRD: 145 ML/MIN/1.73SQ M
GLUCOSE SERPL-MCNC: 101 MG/DL (ref 65–140)
HCT VFR BLD AUTO: 33.8 % (ref 36.5–49.3)
HGB BLD-MCNC: 10.5 G/DL (ref 12–17)
IMM GRANULOCYTES # BLD AUTO: 0.13 THOUSAND/UL (ref 0–0.2)
IMM GRANULOCYTES NFR BLD AUTO: 1 % (ref 0–2)
LYMPHOCYTES # BLD AUTO: 1.24 THOUSANDS/ΜL (ref 0.6–4.47)
LYMPHOCYTES NFR BLD AUTO: 10 % (ref 14–44)
MAGNESIUM SERPL-MCNC: 1.9 MG/DL (ref 1.9–2.7)
MCH RBC QN AUTO: 30.7 PG (ref 26.8–34.3)
MCHC RBC AUTO-ENTMCNC: 31.1 G/DL (ref 31.4–37.4)
MCV RBC AUTO: 99 FL (ref 82–98)
MONOCYTES # BLD AUTO: 0.47 THOUSAND/ΜL (ref 0.17–1.22)
MONOCYTES NFR BLD AUTO: 4 % (ref 4–12)
NEUTROPHILS # BLD AUTO: 10.14 THOUSANDS/ΜL (ref 1.85–7.62)
NEUTS SEG NFR BLD AUTO: 84 % (ref 43–75)
NRBC BLD AUTO-RTO: 0 /100 WBCS
P AXIS: 25 DEGREES
PHOSPHATE SERPL-MCNC: 2.7 MG/DL (ref 2.7–4.5)
PLATELET # BLD AUTO: 206 THOUSANDS/UL (ref 149–390)
PMV BLD AUTO: 10.6 FL (ref 8.9–12.7)
POTASSIUM SERPL-SCNC: 4 MMOL/L (ref 3.5–5.3)
PR INTERVAL: 146 MS
QRS AXIS: 66 DEGREES
QRSD INTERVAL: 86 MS
QT INTERVAL: 460 MS
QTC INTERVAL: 436 MS
RBC # BLD AUTO: 3.42 MILLION/UL (ref 3.88–5.62)
SODIUM SERPL-SCNC: 135 MMOL/L (ref 135–147)
T WAVE AXIS: 21 DEGREES
VENTRICULAR RATE: 54 BPM
WBC # BLD AUTO: 12.08 THOUSAND/UL (ref 4.31–10.16)
ZINC SERPL-MCNC: 56 UG/DL (ref 44–115)

## 2024-10-21 PROCEDURE — 80048 BASIC METABOLIC PNL TOTAL CA: CPT

## 2024-10-21 PROCEDURE — 94760 N-INVAS EAR/PLS OXIMETRY 1: CPT

## 2024-10-21 PROCEDURE — 87070 CULTURE OTHR SPECIMN AEROBIC: CPT | Performed by: STUDENT IN AN ORGANIZED HEALTH CARE EDUCATION/TRAINING PROGRAM

## 2024-10-21 PROCEDURE — 31624 DX BRONCHOSCOPE/LAVAGE: CPT | Performed by: STUDENT IN AN ORGANIZED HEALTH CARE EDUCATION/TRAINING PROGRAM

## 2024-10-21 PROCEDURE — 0B978ZX DRAINAGE OF LEFT MAIN BRONCHUS, VIA NATURAL OR ARTIFICIAL OPENING ENDOSCOPIC, DIAGNOSTIC: ICD-10-PCS | Performed by: STUDENT IN AN ORGANIZED HEALTH CARE EDUCATION/TRAINING PROGRAM

## 2024-10-21 PROCEDURE — 71045 X-RAY EXAM CHEST 1 VIEW: CPT

## 2024-10-21 PROCEDURE — 99233 SBSQ HOSP IP/OBS HIGH 50: CPT

## 2024-10-21 PROCEDURE — 85025 COMPLETE CBC W/AUTO DIFF WBC: CPT

## 2024-10-21 PROCEDURE — 87205 SMEAR GRAM STAIN: CPT | Performed by: STUDENT IN AN ORGANIZED HEALTH CARE EDUCATION/TRAINING PROGRAM

## 2024-10-21 PROCEDURE — 94640 AIRWAY INHALATION TREATMENT: CPT

## 2024-10-21 PROCEDURE — 99232 SBSQ HOSP IP/OBS MODERATE 35: CPT | Performed by: STUDENT IN AN ORGANIZED HEALTH CARE EDUCATION/TRAINING PROGRAM

## 2024-10-21 PROCEDURE — 93010 ELECTROCARDIOGRAM REPORT: CPT | Performed by: INTERNAL MEDICINE

## 2024-10-21 PROCEDURE — 87186 SC STD MICRODIL/AGAR DIL: CPT | Performed by: STUDENT IN AN ORGANIZED HEALTH CARE EDUCATION/TRAINING PROGRAM

## 2024-10-21 PROCEDURE — 83735 ASSAY OF MAGNESIUM: CPT

## 2024-10-21 PROCEDURE — 94003 VENT MGMT INPAT SUBQ DAY: CPT

## 2024-10-21 PROCEDURE — 87077 CULTURE AEROBIC IDENTIFY: CPT | Performed by: STUDENT IN AN ORGANIZED HEALTH CARE EDUCATION/TRAINING PROGRAM

## 2024-10-21 PROCEDURE — 99153 MOD SED SAME PHYS/QHP EA: CPT | Performed by: STUDENT IN AN ORGANIZED HEALTH CARE EDUCATION/TRAINING PROGRAM

## 2024-10-21 PROCEDURE — 99152 MOD SED SAME PHYS/QHP 5/>YRS: CPT | Performed by: STUDENT IN AN ORGANIZED HEALTH CARE EDUCATION/TRAINING PROGRAM

## 2024-10-21 PROCEDURE — 84100 ASSAY OF PHOSPHORUS: CPT

## 2024-10-21 PROCEDURE — 94150 VITAL CAPACITY TEST: CPT

## 2024-10-21 PROCEDURE — 82330 ASSAY OF CALCIUM: CPT

## 2024-10-21 RX ORDER — MAGNESIUM SULFATE HEPTAHYDRATE 40 MG/ML
2 INJECTION, SOLUTION INTRAVENOUS ONCE
Status: COMPLETED | OUTPATIENT
Start: 2024-10-21 | End: 2024-10-21

## 2024-10-21 RX ORDER — LIDOCAINE HYDROCHLORIDE 10 MG/ML
INJECTION, SOLUTION EPIDURAL; INFILTRATION; INTRACAUDAL; PERINEURAL
Status: DISPENSED
Start: 2024-10-21 | End: 2024-10-22

## 2024-10-21 RX ORDER — OXYCODONE HYDROCHLORIDE 5 MG/1
5 TABLET ORAL EVERY 6 HOURS PRN
Status: DISCONTINUED | OUTPATIENT
Start: 2024-10-21 | End: 2024-10-31 | Stop reason: HOSPADM

## 2024-10-21 RX ORDER — FENTANYL CITRATE 50 UG/ML
INJECTION, SOLUTION INTRAMUSCULAR; INTRAVENOUS
Status: COMPLETED
Start: 2024-10-21 | End: 2024-10-21

## 2024-10-21 RX ORDER — EPINEPHRINE 1 MG/ML
INJECTION, SOLUTION, CONCENTRATE INTRAVENOUS
Status: DISCONTINUED
Start: 2024-10-21 | End: 2024-10-21 | Stop reason: WASHOUT

## 2024-10-21 RX ORDER — FENTANYL CITRATE 50 UG/ML
100 INJECTION, SOLUTION INTRAMUSCULAR; INTRAVENOUS ONCE
Status: COMPLETED | OUTPATIENT
Start: 2024-10-21 | End: 2024-10-21

## 2024-10-21 RX ORDER — GABAPENTIN 250 MG/5ML
200 SOLUTION ORAL EVERY 8 HOURS
Status: DISCONTINUED | OUTPATIENT
Start: 2024-10-21 | End: 2024-10-22

## 2024-10-21 RX ORDER — GABAPENTIN 250 MG/5ML
200 SOLUTION ORAL 3 TIMES DAILY
Status: DISCONTINUED | OUTPATIENT
Start: 2024-10-21 | End: 2024-10-21

## 2024-10-21 RX ADMIN — Medication 4 ML: at 13:35

## 2024-10-21 RX ADMIN — MAGNESIUM SULFATE HEPTAHYDRATE 2 G: 40 INJECTION, SOLUTION INTRAVENOUS at 07:09

## 2024-10-21 RX ADMIN — Medication 4 ML: at 19:51

## 2024-10-21 RX ADMIN — APIXABAN 5 MG: 5 TABLET, FILM COATED ORAL at 17:48

## 2024-10-21 RX ADMIN — LEVALBUTEROL HYDROCHLORIDE 1.25 MG: 1.25 SOLUTION RESPIRATORY (INHALATION) at 13:35

## 2024-10-21 RX ADMIN — GUAIFENESIN 400 MG: 200 SOLUTION ORAL at 11:32

## 2024-10-21 RX ADMIN — Medication 4 ML: at 02:54

## 2024-10-21 RX ADMIN — Medication 4 ML: at 07:55

## 2024-10-21 RX ADMIN — GABAPENTIN 100 MG: 250 SOLUTION ORAL at 08:34

## 2024-10-21 RX ADMIN — LORAZEPAM 2 MG: 2 INJECTION INTRAMUSCULAR; INTRAVENOUS at 14:02

## 2024-10-21 RX ADMIN — LEVALBUTEROL HYDROCHLORIDE 1.25 MG: 1.25 SOLUTION RESPIRATORY (INHALATION) at 07:55

## 2024-10-21 RX ADMIN — CHLORHEXIDINE GLUCONATE 15 ML: 1.2 RINSE ORAL at 08:33

## 2024-10-21 RX ADMIN — GUAIFENESIN 400 MG: 200 SOLUTION ORAL at 16:49

## 2024-10-21 RX ADMIN — DIAZEPAM 5 MG: 10 INJECTION, SOLUTION INTRAMUSCULAR; INTRAVENOUS at 05:00

## 2024-10-21 RX ADMIN — FENTANYL CITRATE 100 MCG: 50 INJECTION, SOLUTION INTRAMUSCULAR; INTRAVENOUS at 13:10

## 2024-10-21 RX ADMIN — Medication 3 MG: at 21:11

## 2024-10-21 RX ADMIN — GUAIFENESIN 400 MG: 200 SOLUTION ORAL at 04:59

## 2024-10-21 RX ADMIN — FENTANYL CITRATE 100 MCG: 50 INJECTION INTRAMUSCULAR; INTRAVENOUS at 13:10

## 2024-10-21 RX ADMIN — LEVALBUTEROL HYDROCHLORIDE 1.25 MG: 1.25 SOLUTION RESPIRATORY (INHALATION) at 02:53

## 2024-10-21 RX ADMIN — CHLORHEXIDINE GLUCONATE 15 ML: 1.2 RINSE ORAL at 21:11

## 2024-10-21 RX ADMIN — OXYCODONE HYDROCHLORIDE 5 MG: 5 TABLET ORAL at 08:34

## 2024-10-21 RX ADMIN — MIDODRINE HYDROCHLORIDE 10 MG: 5 TABLET ORAL at 01:08

## 2024-10-21 RX ADMIN — LEVALBUTEROL HYDROCHLORIDE 1.25 MG: 1.25 SOLUTION RESPIRATORY (INHALATION) at 19:51

## 2024-10-21 RX ADMIN — MIDODRINE HYDROCHLORIDE 10 MG: 5 TABLET ORAL at 16:49

## 2024-10-21 RX ADMIN — GABAPENTIN 200 MG: 250 SOLUTION ORAL at 16:49

## 2024-10-21 RX ADMIN — MIDODRINE HYDROCHLORIDE 10 MG: 5 TABLET ORAL at 08:33

## 2024-10-21 RX ADMIN — LORAZEPAM 2 MG: 2 INJECTION INTRAMUSCULAR; INTRAVENOUS at 02:58

## 2024-10-21 RX ADMIN — APIXABAN 5 MG: 5 TABLET, FILM COATED ORAL at 08:33

## 2024-10-21 RX ADMIN — OXYCODONE HYDROCHLORIDE 5 MG: 5 TABLET ORAL at 02:18

## 2024-10-21 NOTE — CASE MANAGEMENT
Case Management Progress Note    Patient name Hemanth Swanson  Location ICU 05/ICU 05 MRN [id number]  : 1985 Date 10/21/2024       LOS (days): 16  Geometric Mean LOS (GMLOS) (days):   Days to GMLOS:        OBJECTIVE:        Current admission status: Inpatient  Preferred Pharmacy:   [facility]/pharmacy #[id number] - [place] [id number] [address] [address]  Phone: [phone] Fax: [phone]    Primary Care Provider: Ela Douglas MD    Primary Insurance: [facility]  Secondary Insurance:     PROGRESS NOTE:    Hilario with [email or web address] was at bedside and able to make changes to the VENT that was reflected from the script that was sent to them. If any other changes are made Hilario Whitehead would need to be notified to update vent settings prior to dc.

## 2024-10-21 NOTE — PROCEDURES
Procedural Sedation    Date/Time: 10/21/2024 12:42 PM    Performed by: Yessy Barksdale MD  Authorized by: Yessy Barksdale MD    Immediate pre-procedure details:     Reassessment: Patient reassessed immediately prior to procedure      Reviewed: vital signs      Verified: bag valve mask available, emergency equipment available, IV patency confirmed, oxygen available and suction available    Procedure details (see MAR for exact dosages):     Sedation start time:  10/21/2024 12:43 PM    Preoxygenation:  Bag valve mask    Analgesia:  Fentanyl    Intra-procedure monitoring:  Blood pressure monitoring, frequent LOC assessments, cardiac monitor, continuous pulse oximetry and frequent vital sign checks    Intra-procedure events: hypoxia      Intra-procedure management:  BVM ventilation    Sedation end time:  10/21/2024 1:03 PM    Total sedation time (minutes):  30  Post-procedure details:     Post-sedation assessment completed:  10/21/2024 1:03 PM    Attendance: Constant attendance by certified staff until patient recovered      Recovery: Patient returned to pre-procedure baseline      Post-sedation assessments completed and reviewed: airway patency, mental status and respiratory function      Patient tolerance:  Tolerated with difficulty

## 2024-10-21 NOTE — PLAN OF CARE
Problem: Prexisting or High Potential for Compromised Skin Integrity  Goal: Skin integrity is maintained or improved  Description: INTERVENTIONS:  - Identify patients at risk for skin breakdown  - Assess and monitor skin integrity  - Assess and monitor nutrition and hydration status  - Monitor labs   - Assess for incontinence   - Turn and reposition patient  - Assist with mobility/ambulation  - Relieve pressure over bony prominences  - Avoid friction and shearing  - Provide appropriate hygiene as needed including keeping skin clean and dry  - Evaluate need for skin moisturizer/barrier cream  - Collaborate with interdisciplinary team   - Patient/family teaching  - Consider wound care consult   Outcome: Progressing     Problem: PAIN - ADULT  Goal: Verbalizes/displays adequate comfort level or baseline comfort level  Description: Interventions:  - Encourage patient to monitor pain and request assistance  - Assess pain using appropriate pain scale  - Administer analgesics based on type and severity of pain and evaluate response  - Implement non-pharmacological measures as appropriate and evaluate response  - Consider cultural and social influences on pain and pain management  - Notify physician/advanced practitioner if interventions unsuccessful or patient reports new pain  Outcome: Progressing     Problem: INFECTION - ADULT  Goal: Absence or prevention of progression during hospitalization  Description: INTERVENTIONS:  - Assess and monitor for signs and symptoms of infection  - Monitor lab/diagnostic results  - Monitor all insertion sites, i.e. indwelling lines, tubes, and drains  - Monitor endotracheal if appropriate and nasal secretions for changes in amount and color  - [place] appropriate cooling/warming therapies per order  - Administer medications as ordered  - Instruct and encourage patient and family to use good hand hygiene technique  - Identify and instruct in appropriate isolation precautions for  identified infection/condition  Outcome: Progressing  Goal: Absence of fever/infection during neutropenic period  Description: INTERVENTIONS:  - Monitor WBC    Outcome: Progressing     Problem: SAFETY ADULT  Goal: Patient will remain free of falls  Description: INTERVENTIONS:  - Educate patient/family on patient safety including physical limitations  - Instruct patient to call for assistance with activity   - Consult OT/PT to assist with strengthening/mobility   - Keep Call bell within reach  - Keep bed low and locked with side rails adjusted as appropriate  - Keep care items and personal belongings within reach  - Initiate and maintain comfort rounds  - Make Fall Risk Sign visible to staff  - Apply yellow socks and bracelet for high fall risk patients  - Consider moving patient to room near nurses station  Outcome: Progressing  Goal: Maintain or return to baseline ADL function  Description: INTERVENTIONS:  -  Assess patient's ability to carry out ADLs; assess patient's baseline for ADL function and identify physical deficits which impact ability to perform ADLs (bathing, care of mouth/teeth, toileting, grooming, dressing, etc.)  - Assess/evaluate cause of self-care deficits   - Assess range of motion  - Assess patient's mobility; develop plan if impaired  - Assess patient's need for assistive devices and provide as appropriate  - Encourage maximum independence but intervene and supervise when necessary  - Involve family in performance of ADLs  - Assess for home care needs following discharge   - Consider OT consult to assist with ADL evaluation and planning for discharge  - Provide patient education as appropriate  Outcome: Progressing  Goal: Maintains/Returns to pre admission functional level  Description: INTERVENTIONS:  - Perform AM-PAC 6 Click Basic Mobility/ Daily Activity assessment daily.  - Set and communicate daily mobility goal to care team and patient/family/caregiver.   - Collaborate with rehabilitation  services on mobility goals if consulted  - Out of bed for toileting  - Record patient progress and toleration of activity level   Outcome: Progressing     Problem: DISCHARGE PLANNING  Goal: Discharge to home or other facility with appropriate resources  Description: INTERVENTIONS:  - Identify barriers to discharge w/patient and caregiver  - Arrange for needed discharge resources and transportation as appropriate  - Identify discharge learning needs (meds, wound care, etc.)  - Arrange for interpretive services to assist at discharge as needed  - Refer to Case Management Department for coordinating discharge planning if the patient needs post-hospital services based on physician/advanced practitioner order or complex needs related to functional status, cognitive ability, or social support system  Outcome: Progressing     Problem: Knowledge Deficit  Goal: Patient/family/caregiver demonstrates understanding of disease process, treatment plan, medications, and discharge instructions  Description: Complete learning assessment and assess knowledge base.  Interventions:  - Provide teaching at level of understanding  - Provide teaching via preferred learning methods  Outcome: Progressing     Problem: SAFETY,RESTRAINT: NV/NON-SELF DESTRUCTIVE BEHAVIOR  Goal: Remains free of harm/injury (restraint for non violent/non self-detsructive behavior)  Description: INTERVENTIONS:  - Instruct patient/family regarding restraint use   - Assess and monitor physiologic and psychological status   - Provide interventions and comfort measures to meet assessed patient needs   - Identify and implement measures to help patient regain control  - Assess readiness for release of restraint   Outcome: Progressing  Goal: Returns to optimal restraint-free functioning  Description: INTERVENTIONS:  - Assess the patient's behavior and symptoms that indicate continued need for restraint  - Identify and implement measures to help patient regain control  -  Assess readiness for release of restraint   Outcome: Progressing     Problem: Nutrition/Hydration-ADULT  Goal: Nutrient/Hydration intake appropriate for improving, restoring or maintaining nutritional needs  Description: Monitor and assess patient's nutrition/hydration status for malnutrition. Collaborate with interdisciplinary team and initiate plan and interventions as ordered.  Monitor patient's weight and dietary intake as ordered or per policy. Utilize nutrition screening tool and intervene as necessary. Determine patient's food preferences and provide high-protein, high-caloric foods as appropriate.     INTERVENTIONS:  - Monitor oral intake, urinary output, labs, and treatment plans  - Assess nutrition and hydration status and recommend course of action  - Evaluate amount of meals eaten  - Assist patient with eating if necessary   - Allow adequate time for meals  - Recommend/ encourage appropriate diets, oral nutritional supplements, and vitamin/mineral supplements  - Order, calculate, and assess calorie counts as needed  - Recommend, monitor, and adjust tube feedings and TPN/PPN based on assessed needs  - Assess need for intravenous fluids  - Provide specific nutrition/hydration education as appropriate  - Include patient/family/caregiver in decisions related to nutrition  Outcome: Progressing

## 2024-10-21 NOTE — ASSESSMENT & PLAN NOTE
Intermittent periods of agitation, restless however alert oriented x 4 without loss of consciousness over the past few days  MRI on 10/16 with hypoxic brain injury findings  Goals of care discussions were currently continues with disease treatment palliative care has been follow now signed off as goals of care are clear.  Neurology following recommendations to repeat paraneoplastic workup and an MRI in about a month  [id number]: Culture/stain 2+ poly, 2+ gram-positive rods, 1+ gram-negative rods growing.      Plan  Continue monitor mental status  Per family patient is at baseline events has been persistent over the past year  Regards to restless agitation bearing-down has responded to gabapentin and Ativan as needed  Continue monitoring metabolic derangement  Completed 10-day course of antibiotics for Pseudomonas and Proteus mirabilis pneumonia  Monitor off of antibiotics and re-evaluate as cultures become available

## 2024-10-21 NOTE — ASSESSMENT & PLAN NOTE
Neuromuscular disease post chemotherapyS/P tracheostomy in 9/2023.  He continues to fail SBT likely in the setting of diaphragmatic weakness in the setting of progressive demyelinating/ neuromuscular disease  Goals of care discussions continues disease driven  X-ray showed worsening opacities 10/21  Multiple bronchoscopies resulted thick mucus secretions  Sputum/bronc culture showing growth of Pseudomonas (s/p 10 days treatment for Pseudomonas/Proteus)    Plan:  Continue vent support  Xopnex and hypertonic saline nebs 3 times daily  Consider bronchoscopy for symptomatic relief as needed

## 2024-10-21 NOTE — PLAN OF CARE
Problem: Nutrition/Hydration-ADULT  Goal: Nutrient/Hydration intake appropriate for improving, restoring or maintaining nutritional needs  Description: Monitor and assess patient's nutrition/hydration status for malnutrition. Collaborate with interdisciplinary team and initiate plan and interventions as ordered.  Monitor patient's weight and dietary intake as ordered or per policy. Utilize nutrition screening tool and intervene as necessary. Determine patient's food preferences and provide high-protein, high-caloric foods as appropriate.     INTERVENTIONS:  - Monitor oral intake, urinary output, labs, and treatment plans  - Assess nutrition and hydration status and recommend course of action  - Evaluate amount of meals eaten  - Assist patient with eating if necessary   - Allow adequate time for meals  - Recommend/ encourage appropriate diets, oral nutritional supplements, and vitamin/mineral supplements  - Order, calculate, and assess calorie counts as needed  - Recommend, monitor, and adjust tube feedings and TPN/PPN based on assessed needs  - Assess need for intravenous fluids  - Provide specific nutrition/hydration education as appropriate  - Include patient/family/caregiver in decisions related to nutrition  Outcome: Progressing      Inpatient Behavioral Health Initial Evaluation    Patient:  Trino Brown 62 year old  MRN#:  [id number]  Date of Service:  7/24/2019  Primary Provider:  Sonu Atkinson MD      Chief Complaint:  Depression    Informants:  The patient, who is considered a good historian, as well as the patient's medical record.    History of Present Illness:    Daniel Brown is a 62 year-old male with PMH significant for non-ischemic cardiomyopathy, bifascicular dick, atrial fibrillation (on 2mg warfarin daily), chronic systolic heart failure, s/p LVAD placement, chronic hypercapnic respiratory failure, acquired hypothyroidism, depression and panic disorder who was admitted for treatment of dyspnea, generalized weakness and increasing oxygen requirements at home. Currently endorses continued shortness of breath, proBNP 60977, echocardiogram scheduled for later today to evaluate filling pressures.     The psychiatry service was consulted for concern of increased depression and question of adjusting psychiatric medications. On interview, patient states that his mood has been \"good.\" When asked about decreased PO intake that was mentioned in the H&P, patient states he has had decreased appetite secondary to increasing dyspnea and generalized weakness, but that he has been making an active effort to eat 3 meals daily despite this. Discussed how he is coping with current health difficulties, patient states that \"it's an endgame thing,\" conveying that while he is feeling worse now he is hopeful of improving in the future, ideally getting a heart transplant per patient. He does endorse recent low energy and moving more slowly than usual, which he attributes to his cardiac conditions. Denies feelings of dysphoria, anxiety. No change in sleep habits, guilt, decrease in concentration, SI.     Patient reports he feels his current medication regimen of pregabalin 25mg BID, sertraline 75mg total daily, topiramate 25mg BID and olanzapine 5mg  QHS. He has been taking 0.5mg clonazepam QHS (prescribed up to 0.5mg BID PRN), which he states has been helping his sleep. He is not currently seeing an outpatient mental health provider, but does not wish to establish this at this time.     Psychiatric Review of Symptoms:  Regarding symptoms of DEPRESSION, the patient endorses low energy, decreased appetite, feeling \"slower than usual\" (see HPI). Denies depressed mood, decreased sleep, anhedonia, feelings of guilt, poor concentration, and suicidal ideation.    Regarding symptoms of GILDARDO, the patient denies decreased need for sleep, grandiosity, distractibility, flight of ideas, increased goal directed activities, pressured speech and inappropriate behavior.    With respect to PSYCHOSIS, the patient denies auditory hallucinations, visual hallucinations, delusions, ideas of reference, thought insertion and thought broadcasting.    When asked about symptoms of GENERALIZED ANXIETY, the patient denies increased worry about health, though he does note recent worry about son, which he does not elaborate on. Denies muscle tension, restlessness, poor concentration, and irritability.    Regarding episodes of PANIC, the patient denies recent episodes of impending doom, lost control or feelings of going crazy.  Furthermore, the patient denies symptoms of sweating, trembling, derealization, racing heart, shortness of breath, chest pain, nausea and tingling.    Addressing POST TRAUMATIC STRESS DISORDER, the patient denies a history of trauma with recurrent episodes of nightmares, flashbacks, hypervigilance, easy startle and elevated fear.    Regarding OBSESSIVE COMPULSIVE DISORDER, the patient denies a history of intrusive thoughts, repetitive behaviors used to allay intrusive thoughts, and marked distress associated with intrusive thoughts.    VITALS:  Visit Vitals  /41 ([place] [facility] [place] Semi-Neely's[place]   Pulse 50   Temp 97.5 °F (36.4 °C) (Oral)   Resp  30   Ht 5' 6\" (1.676 m)   Wt 54.4 kg   SpO2 98%   BMI 19.36 kg/m²         Past Psychiatric History:  Previous Diagnoses:  Panic disorder, unspecified depression  Hospitalizations:  None  Suicide Attempts/Self-Harm Behaviors:  None  Outpatient  Providers:  None  Medication Trials:  Current: pregabalin, sertraline, topiramate, clonazepam, olanzapine. Previous: cymbalta  ECT:  None    Past Medical History:  Past Medical History:   Diagnosis Date   • Anesthesia complication     Anesthesia Awareness   • Anxiety    • Arthritis    • Asthma    • Asthma    • Atrial fibrillation with rapid ventricular response (CMS/HCC)    • Atrial flutter (CMS/HCC)     s/p direct current cardioversion   • Bifascicular block    • Bleeding nose 12/01/2017   • Cardiomyopathy (CMS/HCC)     EF 25 %, nonischemic   • Chest pain 11/2016    hospitalized with elevated Tropin levels   • Complication involving left ventricular assist device (LVAD) 07/13/2017    low flow, patient dizzy   • Congestive Heart Failure 11/2016    non-ischemic cardiomyopathy   • Dizziness    • Dyspnea on exertion    • Failed moderate sedation during procedure     anesthesia awareness during 2 Cardiac procedures   • Gastroesophageal reflux disease     severe GI bleed   • History of blood transfusion 2017    5 units along with 2 FFP due to GI bleed   • Hyperactive airway disease     uses inhalers and takes oral medication at HS   • ICD (implantable cardioverter-defibrillator) in place     Medtronic   • Implantable cardioverter-defibrillator (ICD) generator end of life 7/2016   • Left ventricular hypertrophy     moderate   • Nonsustained ventricular tachycardia (CMS/HCC)    • De Witt syndrome     EF 25 %   • Other and unspecified hyperlipidemia     dyslipidemia   • Other chronic pain     BACK, DEGENERATIVE SPINE   • Pleural effusion, left 11/2017   • Pneumonia    • PONV (postoperative nausea and vomiting)    • Pulmonary hypertension (CMS/HCC)     moderately severe   • S/P redo  sternotomy, redo AVR (tissue), left ventricular assist device - heartware 5/3/2017   • SOB (shortness of breath)    • Staph infection 12/6/13    treated for 5 days with Bactroban (neg MRSA)   • Staphylococcus aureus bacteremia 04/2017    from SWAN catheter   • Warfarin anticoagulation    • Wears glasses        Past Surgical History:  Past Surgical History:   Procedure Laterality Date   • Back surgery  1992    PARTIAL DISC REMOVAL   • Back surgery  1992    disectomy lower vertibrae   • Cardiac defibrillator placement  2012    Medtronic by Dr. Shaw   • Cardiac surgery  1995    aortic valve, st. de[facility]   • Cardiac surgery  9/2007    bi-vent icd medtronic, icd check 2009, 2010   • Cardiac surgery  05/03/2017    bioprosthetic AVR Dr. Kapoor   • Cardioversion  6/21/2011    X7   • Colonoscopy diagnostic  4/12/2011    normal colon, int hemorrhoids, f/u 10yrs, Dr Enriquez   • Colonoscopy diagnostic  05/17/2017    for GI bleed   • Ct angiogram heart  08/16/2012    left atrium is severely dilated. There are 4 pulmonary veins. The left atrial appendage is normal.   • Echo heart resting  03/26/2013    LVEF 39%   • Echo brian  08/16/2012    No ALBERTA or RAA clot. Successful transeptal puncture. No pericardial effusion post ranseptal procedure.   • Echocardiogram  01/31/2017   • Ep ablation  08/16/2012    Pulmonary veins isolation of Left Superior Pulmonary Vein. Procedure discontinued prematurely as it became clear that risks of further attempts at ablation greatly outweighted peotential benefi (this pateint is very unlikely to maintain sinus/Apaced rhythm regardless of intervention) Severe atrial enlargement and electrical disorganization with a relatively stable cycle length rhythm in the high   • Esophagogastroduodenoscopy  05/17/2017    Diagnostic for GI bleed    • Esophagogastroduodenoscopy transoral flex w/place gastrostomy tube  02/06/2018   • Hernia repair     • Icd generator change  7/29/2016    bi-V Medtronic   • Ir thoracentesis  Left 11/27/2017    posterior. 750 cc removed serosangious fluid   • Left ventricular assist device  05/03/2017   • Neck surgery     • Right heart cath  12/08/2016   • Right heart cath  04/14/2017   • Right heart cath  11/16/2018   • Sinus surgery  2005       Allergies:  ALLERGIES:   Allergen Reactions   • Pineda Aspirin [Aspirin]      Acute respiratory distress   • Hydralazine ANXIETY and SHORTNESS OF BREATH   • Prednisone      Affects coumadin, bleeds easily, made INR's rise very high   • Amlodipine ANXIETY and SWELLING   • Entresto [Sacubitril-Valsartan] MYALGIA and Other (See Comments)     Tingling and hot feeling in fingers and toes.  Tolerated losartan in  past   • Mold   (Environmental)      Congestion   • Pollen      Congestion   • Spironolactone ARTHRALGIA     Breast tenderness 2012       Medications:  Current Facility-Administered Medications   Medication Dose Route Frequency Provider Last Rate Last Dose   • acyclovir (ZOVIRAX) tablet 400 mg  400 mg Oral BID Destiny Triplett MD   400 mg at 07/24/19 0856   • albuterol inhaler 2 puff  2 puff Inhalation Q4H Resp PRN Destiny Triplett MD       • ascorbic acid (Vitamin C) tablet 250 mg  250 mg Oral BID Destiny Triplett MD   250 mg at 07/24/19 0856   • cholecalciferol (VITAMIN D3) tablet 2,000 Units  2,000 Units Oral Daily Destiny Triplett MD   2,000 Units at 07/23/19 2301   • clonazePAM (KlonoPIN) tablet 0.5 mg  0.5 mg Oral BID PRN Destiny Triplett MD       • doxycycline hyclate (VIBRAMYCIN) capsule 100 mg  100 mg Oral 2 times per day Destiny Triplett MD   100 mg at 07/24/19 0856   • ferrous sulfate (65 mg Fe per 325 mg) tablet 325 mg  325 mg Oral Daily with breakfast Destiny Triplett MD   325 mg at 07/24/19 0856   • levothyroxine (SYNTHROID, LEVOTHROID) tablet 125 mcg  125 mcg Oral QAM AC Destiny Triplett MD   125 mcg at 07/24/19 0412   • melatonin tablet 9 mg  9 mg Oral Nightly Destiny Triplett MD   9 mg at 07/23/19 2302   •  metoCLOPramide (REGLAN) tablet 5 mg  5 mg Oral Q6H PRN Destiny Triplett MD       • nystatin (MYCOSTATIN) 575835 UNIT/ML suspension 500,000 Units  500,000 Units Swish & Swallow 4x Daily PRN Destiny Triplett MD       • OLANZapine (ZyPREXA ZYDIS) disintegrating tablet 5 mg  5 mg Oral Nightly Destiny Triplett MD   5 mg at 07/24/19 0412   • tiZANidine (ZANAFLEX) tablet 2 mg  2 mg Oral TID Destiny Triplett MD   2 mg at 07/24/19 0856   • oxyCODONE-acetaminophen (PERCOCET) 5-325 MG tablet 1 tablet  1 tablet Oral Q6H PRN Destiny Triplett MD   1 tablet at 07/23/19 2304   • pantoprazole (PROTONIX) EC tablet 40 mg  40 mg Oral 2 times per day Destiny Triplett MD   40 mg at 07/24/19 0856   • polyethylene glycol (GLYCOLAX, MIRALAX) packet 17 g  17 g Oral Daily PRN Destiny Triplett MD       • potassium CHLORIDE ER tablet 20 mEq  20 mEq Oral Daily Destiny Triplett MD   20 mEq at 07/24/19 0856   • pregabalin (LYRICA) capsule 25 mg  25 mg Oral BID Destiny Triplett MD   25 mg at 07/24/19 0859   • sertraline (ZOLOFT) tablet 75 mg  75 mg Oral Daily Destiny Triplett MD   75 mg at 07/24/19 0856   • topiramate (TOPAMAX) tablet 25 mg  25 mg Oral 2 times per day Destiny Triplett MD   25 mg at 07/24/19 0855   • vitamin - therapeutic multivitamins w/minerals 1 tablet  1 tablet Oral Daily Destiny rTiplett MD   1 tablet at 07/24/19 0855   • sodium chloride (PF) 0.9 % injection 2 mL  2 mL Injection 2 times per day Destiny Triplett MD   2 mL at 07/24/19 0857   • sodium chloride (PF) 0.9 % injection 2 mL  2 mL Injection PRN Destiny Triplett MD       • sodium chloride (NORMAL SALINE) 0.9 % bolus 500 mL  500 mL Intravenous PRN Destiny Triplett MD       • potassium CHLORIDE ER tablet 20 mEq  20 mEq Oral Q4H PRN Destiny Triplett MD   20 mEq at 07/24/19 0900   • potassium CHLORIDE (KLOR-CON) packet 20 mEq  20 mEq Per NG tube Q4H PRN Destiny Triplett MD       • potassium CHLORIDE 20 mEq/100mL IVPB premix   20 mEq Intravenous Q4H PRN Destiny Triplett MD       • potassium CHLORIDE ER tablet 40 mEq  40 mEq Oral Q4H PRN Destiny Triplett MD       • potassium CHLORIDE (KLOR-CON) packet 40 mEq  40 mEq Per NG tube Q4H PRN Destiny Triplett MD       • potassium CHLORIDE 20 mEq/100mL IVPB premix  40 mEq Intravenous Q4H PRN Destiny Triplett MD       • magnesium sulfate 1 g in dextrose 5% 100 mL IVPB premix  1 g Intravenous Daily PRN Destiny Triplett MD       • magnesium sulfate 2 g in 50 mL premix IVPB  2 g Intravenous Daily PRN Destiny Triplett MD       • magnesium sulfate 2 g in 50 mL premix IVPB  2 g Intravenous Q4H PRN Destiny Triplett MD       • potassium phosphate/sodium phosphate (K PHOS NEUTRAL) tablet 1 tablet  250 mg Oral BID PRN Destiny Triplett MD       • calcium gluconate 2 g in dextrose 5 % 70 mL total volume IVPB  2 g Intravenous PRN Destiny Triplett MD       • ondansetron (ZOFRAN) injection 4 mg  4 mg Intravenous BID PRN Destiny Triplett MD       • prochlorperazine (COMPAZINE) injection 5 mg  5 mg Intravenous Q4H PRN Destiny Triplett MD       • acetaminophen (TYLENOL) tablet 650 mg  650 mg Oral Q4H PRN Destiny Triplett MD   650 mg at 07/23/19 2302   • docusate sodium-sennosides (SENOKOT S) 50-8.6 MG 2 tablet  2 tablet Oral Daily PRN Destiny Triplett MD       • bisacodyl (DULCOLAX) suppository 10 mg  10 mg Rectal Daily PRN Destiny Triplett MD       • sodium chloride 0.9 % flush bag 500 mL  500 mL Intravenous PRN Destiny Triplett MD       • lactobacillus acidophilus (BACID) tablet 1 tablet  1 tablet Oral BID Destiny Triplett MD   1 tablet at 07/24/19 0856       Social History:   B[place]:  [place] [facility]  Parents/Siblings:  Father passed, 2 brothers and mother live in [place], feels supported by them.  Childhood:  Went to  in [place]. Childhood was happy.  History of Abuse:  None  Education:  Degree from [employer] in small engine engineering.  Relationships:   Family listed above as well as wife (Samantha) and son (21y, lives in [place]) are important to the patient.  Hobbies:  Golf  Financial Status:  No issues, wife works full time and patient is \"semi-retired\" due to health problems  Legal History:  No issues  Spirituality:  Sabianism  Alcohol:  No past/current use.  Tobacco:  No past/current use.  Drugs:  No past/current use.     History     Social History     Social History Narrative   • Not on file       Family Medical History:  family history includes Cancer in his paternal aunt; Congenital Heart Disease in his father; Diabetes in his brother and paternal uncle; Gastrointestinal in his mother; Heart in his father; Heart disease in his father, mother, and son; High blood pressure in his brother, brother, and mother; Hypertension in his mother.    Mental Status Exam:  Orientation:  Alert and oriented to person, place and situation   Appearance:  Appropriately groomed and casually dressed   Speech:  Appropriate rate, rhythm, volume and inflection   Eye contact:  Appropriate  Behavior:  Cooperative with interview, pleasant, making jokes  Psychomotor:  Some slowing noted, otherwise no abnormal movement  Mood:  \" Good \"  Affect:  Euthymic, congruent  Thought Process:  Linear, logical, goal oriented   Suicidality:  None  Comprehensive Suicide assessment:  Complete  Prior Attempts:  None  Access to lethal means:  None  Plan:  None  Family history of completed suicides:  One in his 2nd cousin, otherwise no  Thought Content:  No homicidal ideation, no auditory and visual hallucinations, not actively responding to internal stimuli   Insight:  Fair  Judgment:  Good  Sensorium:  Grossly intact   Cognition:  Was not formally tested and found to be without appreciable deficit   Attention:  Good  Concentration:  Good    Pertinent Lab Data  Most recent TSH 0.75     Biopsychosocial Assessment:  Trino Brown 62 year old male with PMH significant for non-ischemic cardiomyopathy,  bifascicular dick, atrial fibrillation (on 2mg warfarin daily), chronic systolic heart failure, s/p LVAD placement, chronic hypercapnic respiratory failure, acquired hypothyroidism, depression and panic disorder who was admitted for treatment of dyspnea, generalized weakness and increasing oxygen requirements at home. Psychiatry service was consulted for depression and potential medication adjustments. On interview, patient denies any increase in his depressive symptoms, endorses current medications are working well for him. He feels hopeful about the future and appears to effectively be using coping mechanisms to process his current health status.     Patient cited his wife, Samantha, as a significant source of support. In admitting EHR documentation it was noted that Samantha had concerns about patient's failure to thrive at home, and could potentially provide a different perspective on his mood . Joint interview with patient and his wife or collateral information from her would be useful in making final recommendations.     Patient is well known to me from prior outpatient and inpatient hospitalization never fully agreed that he is depressed despite vegetative symptoms of depression. He always was a man and control of his life and could make things happen as he wished. Unfortunately was quite angry and upset when he had to settle with getting an LVAD versus direct heart transplant. He had several setbacks could not recover from a protracted hospital stays frequent hospitalizations and failure to thrive. He is unable to see the connection between his neurovegetative symptoms and depression. He reluctantly agreed to be started on an SSRI and olanzapine although his wife reported improvement with medication combinations.     Diagnoses:    Depression, unspecified  Non-ischemic cardiomyopathy  Chronic systolic heart failure  S/p LVAD placement       Plan:  1. At this time, continue lyrica 25mg BID, sertraline 75mg total  daily, topiramate 25mg BID, olanzapine 5mg QHS, clonazepam 0.5mg BID PRN, as this appears to be working well for the patient.   2. Will return tomorrow, ideally to conduct joint interview with patient and his wife.   3. If wife not present tomorrow request permission from patient to call her and obtain collateral information about recent mood and psychiatric symptoms.     Thank you for the opportunity to participate in the care of this patient.     Mena Torres, M3    I have personally assessed the patient with Mena Chaney M3.  I reviewed the chart, discussed the patient with the staff and independently examined and evaluated the patient including mental status.  I have discussed and formulated the treatment plan and I have made necessary revisions as indicated to the above note.    If the patient needs a follow up, please call [phone]    Batsheva Morales MD, FAPCOURTNEY, [facility]  Dept of Psychiatry  [facility]  Ph : [phone]

## 2024-10-21 NOTE — RESPIRATORY THERAPY NOTE
Assisted Dr. Coleman with bedside bronch. No lido was used but was pulled and drawn up. 50 ML saline used for washing. Patient desaturated during procedure so we switched to a smaller scope. Specimens labeled and walked to lab.

## 2024-10-21 NOTE — ASSESSMENT & PLAN NOTE
No evidence of seizure on EMU on multiple admissions including current one  Seizure-like activity is likely stereotypical behaviors likely related to agitation; responding well to Ativan as needed

## 2024-10-21 NOTE — PROGRESS NOTES
Progress Note - Critical Care/ICU   Name: Hemanth Swanson 39 y.o. male I MRN: [id number]  Unit/Bed#: ICU 05 I Date of Admission: 10/5/2024   Date of Service: 10/21/2024 I Hospital Day: 16       Assessment & Plan  Toxic metabolic encephalopathy  Intermittent periods of agitation, restless however alert oriented x 4 without loss of consciousness over the past few days  MRI on 10/16 with hypoxic brain injury findings  Goals of care discussions were currently continues with disease treatment palliative care has been follow now signed off as goals of care are clear.  Neurology following recommendations to repeat paraneoplastic workup and an MRI in about a month  Plan  Continue monitor mental status  Per family patient is at baseline events has been persistent over the past year  Regards to restless agitation bearing-down has responded to gabapentin and Valium on this admission we will continue for now  Continue monitoring metabolic derangement  Completed 10-day course of antibiotics for Pseudomonas and Proteus mirabilis pneumonia  Chronic respiratory failure with hypoxia and hypercapnia (HCC)  Neuromuscular disease post chemotherapyS/P tracheostomy in 9/2023.  He continues to fail SBT likely in the setting of diaphragmatic weakness in the setting of progressive demyelinating/ neuromuscular disease  Goals of care discussions continues disease driven    Plan:  Continue vent support  Xopnex and hypertonic saline nebs 3 times daily  Bradycardia  Intermittent bradycardia not related to hypoxia.   He has been maintaining heart rates above 60 since medication adjustments.  Seroquel discontinue  Heart rate thus far has been low 80s high 90s.  Sepsis (HCC)  Sepsis likely secondary to multifocal pneumonia in the setting of Proteus and Pseudomonas day 10 on antibiotics today for completion.   Now shock undifferentiated consider hypovolemic in the setting of insensible losses, echocardiogram has been remained unremarkable doubt  sepsis at this time given adequate treatment on antibiotics over the past 10 days  Blood pressure remained with maps above 70 overnight.  He did not require pressors.  Will consider POCUS disease continuing evaluate IVC urine output has slowed down  Plan  If pressures are low, will trial of 500 cc Isolyte with monitoring urinary output.   Seizure-like activity (HCC)  No evidence of seizure on EMU on multiple admissions including current one  Seizure-like activity is likely stereotypical behaviors likely related to agitation that had been responded to low-dose diazepam  Umbilical hernia without obstruction and without gangrene  Evaluated by surgery.  Found to not be a surgical candidate due to ongoing comorbidities.  Fat containing incarcerated hernia without bowel.  Palliative care by specialist  Palliative care is following.  Had long discussion with family about goals of care.  They would like to continue all measures.  The are willing to care for him at home.  They reiterated their wishes that he would not like to be sent to a rehab facility.  Goals of care, counseling/discussion  Goals of care discussions multiple admissions including current 1 palliative care follow-up continues with disease driven treatments  Discussed with mother, sister patient unfortunately continues with poor prognosis  Patient has 24/7 support at home  Case management following assistance upon discharge  Abnormal MRI  Noted with abnormal MRI concerning for hypoxic injury  Neurology on board recommendations to repeat paraneoplastic workup repeat MRI in about a month    Disposition: Med Surg    ICU Core Measures     Vented Patient  VAP Bundle  VAP bundle ordered     A: Assess, Prevent, and Manage Pain Has pain been assessed? Yes  Need for changes to pain regimen? No   B: Both Spontaneous Awakening Trials (SATs) and Spontaneous Breathing Trials (SBTs) Plan to perform spontaneous awakening trial today? No secondary to is awake.  Plan to  perform spontaneous breathing trial today? No secondary to vent dependence due to severe demyelinization disorder  Obvious barriers to extubation? Yes   C: Choice of Sedation RASS Goal: 0 Alert and Calm  Need for changes to sedation or analgesia regimen? No   D: Delirium CAM-ICU: Negative   E: Early Mobility  Plan for early mobility? No   F: Family Engagement Plan for family engagement today? Yes       Review of Invasive Devices:    [place] Plan: Continue for accurate I/O monitoring for 48 hours  Central access plan: Medications requiring central line      Prophylaxis:  VTE VTE covered by:  apixaban, Per PEG Tube, 5 mg at 10/21/24 0833       Stress Ulcer  not ordered         24 Hour Events : No acute events.  Patient required Ativan twice  Subjective patient following all commands and is calm and smiling.      Objective :                   Vitals I/O      Most Recent Min/Max in 24hrs   Temp (!) 96.7 °F (35.9 °C) Temp  Min: 96.7 °F (35.9 °C)  Max: 98.6 °F (37 °C)   Pulse 58 Pulse  Min: 48  Max: 86   Resp 14 Resp  Min: 14  Max: 19   /65 BP  Min: 97/57  Max: 151/100   O2 Sat 94 % SpO2  Min: 84 %  Max: 99 %      Intake/Output Summary (Last 24 hours) at 10/21/2024 0854  Last data filed at 10/21/2024 0801  Gross per 24 hour   Intake 2620 ml   Output 2575 ml   Net 45 ml       Diet Enteral/Parenteral; Tube Feeding No Oral Diet; Jevity 1.5; Continuous; 70; 200; Water; Every 4 hours    Invasive Monitoring           Physical Exam   Physical Exam  Skin:     Findings: Lesion (B/L ankle ulcers,) present.   HENT:      Head: Normocephalic.   Neck:      Trachea: Tracheostomy present.   Cardiovascular:      Rate and Rhythm: Bradycardia present.   Musculoskeletal:         General: Swelling present.      Right lower leg: Trace Edema present.      Left lower leg: Trace Edema present.   Abdominal: General: There is no distension.      Tenderness: There is no guarding.   Constitutional:       General: He is not in acute distress.      Appearance: He is well-developed. He is ill-appearing. He is not toxic-appearing.      Interventions: He is intubated and restrained (UE for safety). He is not sedated.  Pulmonary:      Effort: No accessory muscle usage, respiratory distress or accessory muscle usage. He is intubated.      Breath sounds: No stridor. No wheezing, rhonchi or rales.   Secretions are normal.Chest:      Chest wall: No tenderness.   Neurological:      Mental Status: He is alert. Mental status is at baseline. He is calm.      Cranial Nerves: No facial asymmetry.   Genitourinary/Anorectal:  Sheppard present.        Diagnostic Studies        Lab Results: I have reviewed the following results:     Medications:  Scheduled PRN   apixaban, 5 mg, BID  chlorhexidine, 15 mL, Q12H ISAEL  diazepam, 5 mg, Q8H ISAEL  gabapentin, 100 mg, TID  guaiFENesin, 400 mg, Q6H  levalbuterol, 1.25 mg, Q6H  magnesium sulfate, 2 g, Once  melatonin, 3 mg, HS  midodrine, 10 mg, Q8H  oxyCODONE, 5 mg, Q6H  sodium chloride, 4 mL, Q6H      acetaminophen, 650 mg, Q6H PRN  bisacodyl, 10 mg, Daily PRN  fentaNYL, 100 mcg, Once PRN  LORazepam, 2 mg, Q4H PRN  midazolam, 4 mg, Once PRN       Continuous          Labs:   CBC    Recent Labs     10/20/24  0244 10/21/24  0406   WBC 9.39 12.08*   HGB 10.5* 10.5*   HCT 34.0* 33.8*    206     BMP    Recent Labs     10/20/24  0244 10/21/24  0406   SODIUM 135 135   K 3.9 4.0    100   CO2 29 31   AGAP 3* 4   BUN 12 11   CREATININE 0.50* 0.44*   CALCIUM 8.3* 8.4       Coags    No recent results     Additional Electrolytes  Recent Labs     10/20/24  0244 10/21/24  0406   MG 2.0 1.9   PHOS 3.0 2.7   CAIONIZED 1.16 1.12          Blood Gas    No recent results  No recent results LFTs  No recent results    Infectious  No recent results  Glucose  Recent Labs     10/20/24  0244 10/21/24  0406   GLUC 107 101

## 2024-10-21 NOTE — ASSESSMENT & PLAN NOTE
Intermittent bradycardia not related to hypoxia.   He has been maintaining heart rates above 60 since medication adjustments.  Seroquel discontinue  Heart rate between episodes fluctuates from low 80s high 90s.  Continues to experience desaturations/bradycardic episodes likely vasovagal in nature  Removed scheduled benzodiazepines and opioids.    Plan  -Currently treating with Ativan as needed and manual bagging/suction   -Increased gabapentin to 200 mg 3 times daily

## 2024-10-21 NOTE — ASSESSMENT & PLAN NOTE
Sepsis likely secondary to multifocal pneumonia in the setting of Proteus and Pseudomonas day 10 on antibiotics today for completion.   Now shock undifferentiated consider hypovolemic in the setting of insensible losses, echocardiogram has been remained unremarkable doubt sepsis at this time given adequate treatment on antibiotics for 10 days  Blood pressure remained with maps above 70 overnight.  He did not require pressors.  Will consider POCUS disease continuing evaluate IVC urine output has slowed down  Plan  If pressures are low, will trial of 500 cc Isolyte with monitoring urinary output.

## 2024-10-21 NOTE — PLAN OF CARE
Problem: Prexisting or High Potential for Compromised Skin Integrity  Goal: Skin integrity is maintained or improved  Description: INTERVENTIONS:  - Identify patients at risk for skin breakdown  - Assess and monitor skin integrity  - Assess and monitor nutrition and hydration status  - Monitor labs   - Assess for incontinence   - Turn and reposition patient  - Assist with mobility/ambulation  - Relieve pressure over bony prominences  - Avoid friction and shearing  - Provide appropriate hygiene as needed including keeping skin clean and dry  - Evaluate need for skin moisturizer/barrier cream  - Collaborate with interdisciplinary team   - Patient/family teaching  - Consider wound care consult   Outcome: Progressing     Problem: PAIN - ADULT  Goal: Verbalizes/displays adequate comfort level or baseline comfort level  Description: Interventions:  - Encourage patient to monitor pain and request assistance  - Assess pain using appropriate pain scale  - Administer analgesics based on type and severity of pain and evaluate response  - Implement non-pharmacological measures as appropriate and evaluate response  - Consider cultural and social influences on pain and pain management  - Notify physician/advanced practitioner if interventions unsuccessful or patient reports new pain  Outcome: Progressing     Problem: INFECTION - ADULT  Goal: Absence or prevention of progression during hospitalization  Description: INTERVENTIONS:  - Assess and monitor for signs and symptoms of infection  - Monitor lab/diagnostic results  - Monitor all insertion sites, i.e. indwelling lines, tubes, and drains  - Monitor endotracheal if appropriate and nasal secretions for changes in amount and color  - [place] appropriate cooling/warming therapies per order  - Administer medications as ordered  - Instruct and encourage patient and family to use good hand hygiene technique  - Identify and instruct in appropriate isolation precautions for  identified infection/condition  Outcome: Progressing  Goal: Absence of fever/infection during neutropenic period  Description: INTERVENTIONS:  - Monitor WBC    Outcome: Progressing     Problem: SAFETY ADULT  Goal: Patient will remain free of falls  Description: INTERVENTIONS:  - Educate patient/family on patient safety including physical limitations  - Instruct patient to call for assistance with activity   - Consult OT/PT to assist with strengthening/mobility   - Keep Call bell within reach  - Keep bed low and locked with side rails adjusted as appropriate  - Keep care items and personal belongings within reach  - Initiate and maintain comfort rounds  - Make Fall Risk Sign visible to staff  - Offer Toileting every 2 Hours, in advance of need  - Initiate/Maintain bed alarm  - Obtain necessary fall risk management equipment:   - Apply yellow socks and bracelet for high fall risk patients  - Consider moving patient to room near nurses station  Outcome: Progressing  Goal: Maintain or return to baseline ADL function  Description: INTERVENTIONS:  -  Assess patient's ability to carry out ADLs; assess patient's baseline for ADL function and identify physical deficits which impact ability to perform ADLs (bathing, care of mouth/teeth, toileting, grooming, dressing, etc.)  - Assess/evaluate cause of self-care deficits   - Assess range of motion  - Assess patient's mobility; develop plan if impaired  - Assess patient's need for assistive devices and provide as appropriate  - Encourage maximum independence but intervene and supervise when necessary  - Involve family in performance of ADLs  - Assess for home care needs following discharge   - Consider OT consult to assist with ADL evaluation and planning for discharge  - Provide patient education as appropriate  Outcome: Progressing  Goal: Maintains/Returns to pre admission functional level  Description: INTERVENTIONS:  - Perform AM-PAC 6 Click Basic Mobility/ Daily Activity  assessment daily.  - Set and communicate daily mobility goal to care team and patient/family/caregiver.   - Collaborate with rehabilitation services on mobility goals if consulted  - Perform Range of Motion 3 times a day.  - Reposition patient every 2 hours.  - Dangle patient 2 times a day  - Stand patient 2 times a day  - Ambulate patient 2 times a day  - Out of bed to chair 2 times a day   - Out of bed for meals 2 times a day  - Out of bed for toileting  - Record patient progress and toleration of activity level   Outcome: Progressing     Problem: DISCHARGE PLANNING  Goal: Discharge to home or other facility with appropriate resources  Description: INTERVENTIONS:  - Identify barriers to discharge w/patient and caregiver  - Arrange for needed discharge resources and transportation as appropriate  - Identify discharge learning needs (meds, wound care, etc.)  - Arrange for interpretive services to assist at discharge as needed  - Refer to Case Management Department for coordinating discharge planning if the patient needs post-hospital services based on physician/advanced practitioner order or complex needs related to functional status, cognitive ability, or social support system  Outcome: Progressing     Problem: Knowledge Deficit  Goal: Patient/family/caregiver demonstrates understanding of disease process, treatment plan, medications, and discharge instructions  Description: Complete learning assessment and assess knowledge base.  Interventions:  - Provide teaching at level of understanding  - Provide teaching via preferred learning methods  Outcome: Progressing     Problem: SAFETY,RESTRAINT: NV/NON-SELF DESTRUCTIVE BEHAVIOR  Goal: Remains free of harm/injury (restraint for non violent/non self-detsructive behavior)  Description: INTERVENTIONS:  - Instruct patient/family regarding restraint use   - Assess and monitor physiologic and psychological status   - Provide interventions and comfort measures to meet  assessed patient needs   - Identify and implement measures to help patient regain control  - Assess readiness for release of restraint   Outcome: Progressing  Goal: Returns to optimal restraint-free functioning  Description: INTERVENTIONS:  - Assess the patient's behavior and symptoms that indicate continued need for restraint  - Identify and implement measures to help patient regain control  - Assess readiness for release of restraint   Outcome: Progressing     Problem: Nutrition/Hydration-ADULT  Goal: Nutrient/Hydration intake appropriate for improving, restoring or maintaining nutritional needs  Description: Monitor and assess patient's nutrition/hydration status for malnutrition. Collaborate with interdisciplinary team and initiate plan and interventions as ordered.  Monitor patient's weight and dietary intake as ordered or per policy. Utilize nutrition screening tool and intervene as necessary. Determine patient's food preferences and provide high-protein, high-caloric foods as appropriate.     INTERVENTIONS:  - Monitor oral intake, urinary output, labs, and treatment plans  - Assess nutrition and hydration status and recommend course of action  - Evaluate amount of meals eaten  - Assist patient with eating if necessary   - Allow adequate time for meals  - Recommend/ encourage appropriate diets, oral nutritional supplements, and vitamin/mineral supplements  - Order, calculate, and assess calorie counts as needed  - Recommend, monitor, and adjust tube feedings and TPN/PPN based on assessed needs  - Assess need for intravenous fluids  - Provide specific nutrition/hydration education as appropriate  - Include patient/family/caregiver in decisions related to nutrition  Outcome: Progressing

## 2024-10-21 NOTE — PROGRESS NOTES
"Physical Medicine and Rehabilitation Progress Note  Hemanth Swanson 39 y.o. male MRN: [id number]  Unit/Bed#: ICU 05 Encounter: [id number]      Chief Complaints:  Resp failure     Subjective/eval:     On eval, patient eyes open to voice/spontaneous, he is able to track, he was able to squeeze both hands to command and show a few fingers in R hand to command as well as slightly move legs/toes to verbal command.  Unable to adequately communicate via head nodding, finger/hand, or other means at this time.  (No increased tone, reflexes remain depressed)    ROS: Could not be adequately (or fully adequately be) obtained due to degree of impaired cognition/communication at time of encounter.    Assessment & Plan:     39-year-old male with a past medical history of metastatic left testicular seminoma, status post left orchiectomy, chemotherapy, hypertension, anxiety, bipolar disorder, who developed hearing loss possible left-sided vestibular schwannoma, neuromuscular weakness, diplopia, bowel bladder incontinence following chemotherapy which was stopped.  Patient had extended hospital stay in August to September 2023 for this which also included respiratory failure requiring trach and PEG pulmonary embolism for which patient continues chronic trach/PEG status.  Patient also had hospitalization 5/2024 for multilobar PNA, hypoxic bradycardia felt 2/2 vasal vagal stimulation increased secretions and position of trach with mucous plugging c/b cardiac arrest with pulseless asystole without hypoxia, seizure like actiity felt to not be seizure activity helped apparently with managing secretions and keeping O2 above 90%.  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  There was no no electrodiagnostic evidence to support a diffuse neurogenic process such as motor neuron disease or a myopathic process affecting the peripheral nervous system.  Last OP neuro note 7/2024, \"given the lack of an obvious cause of his respiratory failure it is reasonable to consider neuromuscular disease. However, there is no evidence that this is the case. Specifically, there is nothing to suggest primary muscle disease, primary nerve disease, or neuromuscular junction disorders.\"      Of note,  patient was eating and drinking orally and they would not provide PEG tube feeds if eating or drinking adequately.  Patient would not drink plain water and drank mostly flavored drinks and sometimes soda per family.      Per family he had slow decline in function but overall was stable last few months at home with them managing him with trach/peg status.  He did have some hot flashes and sweating at home and was seen by urology late Aug and was started on IM testosterone biweekly for over a month.  He was noted to have increased agitation/restlessness for a week or so (per family he did not have these prior) and then significant more agitation and anger and was recommended to hold testosterone with last dose 9/30.         On 10/5, patient apparently pulled out PEG during bout of agitation and presented to hospital where G tube was replaced but he was noted to be hypoxic with copious secretions and febrile.  He required adjustments to ventilation.  He was found to have complete L sided consolidation along with severe hypernatremia 176.  Patient was treated for PNA and sepsis with Abx.  Patient bronch culture + Proteus and pseudomonas and remains on Zosyn.  Leukocytosis improved 10/14.  Patient received IVF and nephro consulted for hypernatremia which was felt to be 2/2 dehydration which has been improving.  He had episode of rhythmic body jerking for several " "minutes on 10/10 and additional shaking episodes with hypoxia and bradycardia with hypotension requiring pressors.  He was placed on vEEG which captures some of these shaking spells which did not correlate with EEG seizures.  Neuro felt spells may be due to vagal response and possibly convulsive syncope in setting of hypoxia/bradycardia with possible functional component.  They did not feel he needed AED.       Significant episode of L lung mucous plugging requiring bronch suctioning 10/15; TTE 10/15 normal EF, diastolic function, R ventricle moderately dilated and new compared with 6/2024 study.   Florinef d/c'd; Remains on midodrine 15mg TID; remains on Oxy 5mg Q6H ATC; seroquel 25mg HS.  Required multiple doses of benzo on 10/16 for episodes of agitation/distress and need for sedation with planned MRI brain.     10/16, Discussion with CCM attending and concern for ongoing refractory respiratory failure and likely neuromuscular weakness significantly impacting resp muscles/diaphragm; given this degree of weakness, lack of available effective treatments, he is at risk for ongoing difficulty clearing secretions, mucous plugging, PNA, and respiratory collapse even with optimal ventilation.       10/16 MRI brain showed Mildly restricted diffusion in the splenium of the corpus callosum and more subtly in the anterior cingulate gyri. Possible sequela of hypoxic ischemic injury, inflammatory or infectious process. Per neuro \" with abnormal lesion in the splenium of corpus callosum (without post contrast enhancement), thus neurology asked to weigh in on MRI findings (CLOCC; cytotoxic lesion of corpus collosum, can carry broad spectrum of possible etiologies).\"Unclear etiology at this time. Would recommend repeat MRI in 1 month to re-evaluate findings.\"    10/21 CXR apparently worsening and plan for repeat bronch today?.  Did require brief bagging as well.        Prior Level of Function and Social history:    Patient lives " at home with family - sister Dmitry and Aunt Magaly who provide caregiving.  He was able to move arms and legs but was weak throughout.  He was able to type on computer and phone and follow simple commands and voice words.  Patient was eating and drinking orally and they would not provide PEG tube feeds if eating or drinking adequately.  Patient would not drink plain water and drank mostly flavored drinks and sometimes soda.  He could help some with dressing.  He is chronically incontinent of b/b but would notifiy CG's/family when he went.  Overall though was dependent with ADLs and mobility; WVC, Wallace lift, Electric recliner; In past he could get to commode but not last several months.       Current Level of Function:    Dependent     Acute on Chronic respiratory failure with hypoxia  Neuromuscular weakness  Sepsis  AMS/encephalopathy  Diaphoresis   Hypotension  Bradycardia  Elevated Testosterone; hx of hypogonadism   Metastatic left testicular seminoma, status post left orchiectomy, chemotherapy  Trach dependent  PEG status  Hx of PE  Pneumonia  Hypernatremia  Seizure like episodes  Bowel and bladder dysfunction   Agitation, restlessness, insomnia, hx of anxiety and bipolar  Encounter for skin care     Dispo rec:  TBD - patient critically ill at this time; family would like patient to be stabilized and come home but currently not stable enough for safe discharge home and is at increased risk of mortality; close follow-up with palliative/CCM and family appropriate      Acute on Chronic respiratory failure with hypoxia on chronic vent, Sepsis, PNA  - 10/21 - pt afeb, increased WBC  10/21 10/21 CXR apparently worsening and plan for repeat bronch today?.  Did require brief bagging as well.  - 10/18 Zosyn/Abx course completed   - 10/17 trach exchanged for issues with balloon  - Started on valium and gabapentin for repeated episode of inability to tolerate vent   - 10/15 Significant episode of L lung mucous plugging  "requiring bronch suctioning 10/15;   - Concern for ongoing refractory respiratory failure and likely neuromuscular weakness significantly impacting resp muscles/diaphragm; given this degree of weakness, lack of available effective treatments, he is at risk for ongoing difficulty clearing secretions, mucous plugging, PNA, and respiratory collapse even with optimal ventilation  - TTE 10/15 normal EF, diastolic function, R ventricle moderately dilated and new compared with 6/2024 study  - 10/5 P/w complete opacification of L hemithorax, hypoxic with copious secretions, fever following increasing bouts of agitation and pulling out peg in context of recent initiation of IM testosterone (unclear relation)  - he had last available swallow  studies in 10 and 11/2023 and were unremarkable and family has been providing oral feeds/oral fluids mostly at home   - 5/2024 hospitalization for multilobar PNA, hypoxic bradycardia felt 2/2 vasal vagal stimulation increased secretions and position of trach with mucous plugging c/b cardiac arrest with pulseless asystole without hypoxia, seizure like actiity felt to not be seizure activity helped apparently with managing secretions and keeping O2 above 90%  - Ensure appropriate mgmt of secretions and trach positioning   - Currently NPO status/TF only - Ensure SLP eval and MBS study prior to any future considerations for oral intake   - 10/14 Having intermittent bouts of increased PIP - sometime corresponding to diaphoresis and possibly \"fighting vent\"/anxiety, agitation per discussion with staff; had elevated PIP/low VT improved with suction of thick yellow mucous 10/14; did receive IV ativan with brief improvement earlier in day; oxy 5 x1 with little improvement   - Repeat CXR 10/14 pending; prior CXRs showing L basilar effusion and atelectasis    - CT C 10/11 - Moderate bibasilar pulmonary opacification consistent with atelectasis; superimposed pneumonia not entirely excludable. Trace " "left greater than right pleural effusions.   - Last CTPE study 5/30 negative for PE   - Overall sedation per [facility]              - Oxy 5mg Q6H scheduled changed to PRN       - Diazepam standing stopped              - Gabapentin 100mg consider increasing to 200-300mg TID if tolerated - and potentially trying to wean opiates/benzos when able > can further adjust as indicated  - Treated recently for PNA, possible atelectasis -  - Was on multiple agents for BP support -    - Now only midodrine  - Chronic resp failure etiology uncertain - neuro work-up inconclusive   - Overall mgmt and sedation per [facility] - fentanyl stopped 10/12, receiving only Seroquel 25mg HS, 2mg Ativan Q4H PRN 2 doses 10/13 and 1 dose 10/14 sofar   - Monitor vent values, other vitals, labs, lung exam, overall exam, secretions closely  - Suction PRN when indicated  - Monitor for ventilator associated complications     Neuromuscular weakness, AMS/encephalopathy   - Mentation better on my exam today than prior exam and is moving all limbs better; still quite weak  - High risk of unexplained neuromuscular d/o driving resp failure at this point - without significant effective treatments   - Zinc, selenium pending to eval for myoencephalopathy  - F/u paraneoplastic panel panel pending  - risk of encephalopathy or other cause as well as progression of underlying condition of uncertain etiology  - Consider repeat overall EMG/NCS to look for active demyelination/axonopathy and to include phrenic nerve testing (if phrenic nerve not involved could discuss case with MELE/Brian to see if he would be candidate for diaphragmatic pacing) - follow-up with neurology as needed   - 10/16 MRI brain showed Mildly restricted diffusion in the splenium of the corpus callosum and more subtly in the anterior cingulate gyri. Possible sequela of hypoxic ischemic injury, inflammatory or infectious process. Per neuro \" with abnormal lesion in the splenium of corpus callosum (without " "post contrast enhancement), thus neurology asked to weigh in on MRI findings (CLOCC; cytotoxic lesion of corpus collosum, can carry broad spectrum of possible etiologies).\"\"Unclear etiology at this time. Would recommend repeat MRI in 1 month to re-evaluate findings.\"  - Per neuro 10/19 \"neurohospitalist Dr. Geiger, d/w'ed Dr. Sams - reasonable to consider MRI c-spine w/wo contrast, given c/f bilateral diaphragm paralysis and evaluate for any structural abnormalities that could be impacting phrenic nerves. If obtained, please reach out to us if further input needed. \"  - Requires 24-7 caregivers and dependent with ADLs and mobility   - flaccid tone overall, generalized weakness, could track and move all extremities some to command but not consistently   - Monitor neuro exam and cosider  - While patient at current functional level would also focus on prevention or improvement of complications (malnutrition, dehydration, PNA, atelectasis, VTE, constipation/obstruction, urinary retention, UTI, ulcerations, contractures).  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  There was no no electrodiagnostic evidence to support a diffuse neurogenic process such as motor neuron disease or a myopathic process affecting the peripheral nervous system.  Last OP neuro note 7/2024, \"given the lack of " "an obvious cause of his respiratory failure it is reasonable to consider neuromuscular disease. However, there is no evidence that this is the case. Specifically, there is nothing to suggest primary muscle disease, primary nerve disease, or neuromuscular junction disorders.\"      Hypotension, bradycardia   - BP improving trend; HR occasionally low 50s   - Prior hospitalization 5/2024 for multilobar PNA \"noted hypoxic bradycardia felt 2/2 vasal vagal stimulation increased secretions and position of trach with mucous plugging\"  - Ensure appropriate mgmt of secretions and trach positioning   - Only on midodrine 10mg TID now - was not on at home  (Was on multiple agents for BP support recently - (levophed stopped first, than hydrocortisone and most recently florinef; ensure adequate hydration (BUN/Cr appropriate, sodium improved)- Flaccid tone mostly, did have some dilated reactive pupils during episode of diaphoresis and resp distress on vent - presentation and hx not c/w traditional autonomic storming  - Ensure mgmt of infection or other causes    - TTE 10/15 normal EF, diastolic function, R ventricle moderately dilated and new compared with 6/2024 study  - Completed Zosyn course 10/18  - Fluids per CCM - 200 cc free water Q4H ; TFs     Elevated Testosterone (Endocrinopathy); hx of hypogonadism   - Patient received T Cypionate 100mg IM biweekly for over a month with last dose 9/30 for hypogonadism earlier this month (T level prior 19) - this is long lasting T formulation and now 3 weeks since last IM dose  - 10/11 FT >50, Total T >1500 (Max) - still fairly high level for 11 days later (half life 8-12 d but can stay in body for several weeks gradually decreasing  - Gynecomastia  (can be conversion of T to E, steroids) - can lead to MI, CVA, heart failure, polycythemia, mood swings, agitation, anxiety, depression, tendon injury, sleep apnea, DM, Wt gain, cog decline  - Query if high T level cause bouts of " "agitation/aggression recently at home which is possible based on hx from family   - Mildly elevated prolactin   - FSH, LH both low; AFP, hCG negative  - He did have some hot flashes and sweating at home and was seen by urology late Aug and was started on IM testosterone biweekly for over a month.  He was noted to have increased agitation/restlessness for a week or so (per family he did not have these prior) and then significant more agitation and anger and was recommended to hold testosterone with last dose 9/30.      - Consider Endocrinology consult     Metastatic left testicular seminoma, status post left orchiectomy, chemotherapy  - Work-up and mgmt per CCM/H-O  - AFP, hCG negative  - CT C/A/P without suspicious findings      PEG status  - Monitor site, bowel function  - TF's per primary      Hypernatremia  - Remains improved felt to be lack of free water/dehydration per nephro; treated sepsis/PNA on admission  - Per family, patient was eating and drinking orally and they would not provide PEG tube feeds if eating or drinking adequately.  Patient would not drink plain water and drank mostly flavored drinks and sometimes soda per family.   - Fluid boluses and Tfs per CCM/nutrition   - Ensure caregiver education on appropriate intake/flushes and monitoring after d/c   - Monitor  - On 200 ml fluid boluses Q4H which is more than last nutrition note 10/7 > which would give  Close to 2500 ml water/d if getting full Tfs and flushes with meds;   - \"continuous tube feeds of Jevity 1.5 @ 70 ml/hr. Water flushes of 150 ml q 4 hrs. Start at 20 ml/hr, advance by 10 ml q 4 hrs as tolerated until goal rate is reached. Defer additional water flush adjustments to critical care d/t hypernatremia.At goal EN regimen provides 2520 kcals, 107 g protein, and 2177 ml total water from formula + flushes;      Seizure like episodes  - Neuro felt spells may be due to vagal response and possibly convulsive syncope in setting of " "hypoxia/bradycardia with possible functional component.  They did not feel he needed AED.    - episode of rhythmic body jerking for several minutes on 10/10 and additional shaking episodes with hypoxia and bradycardia with hypotension requiring pressors.  He was placed on vEEG which captures some of these shaking spells which did not correlate with EEG seizure  -5/2024 hospitalization - also had seizure like actiity felt to not be seizure activity helped apparently with \"managing secretions and keeping O2 above 90%\"  - On valium 5mg TID and gabapentin 100mg TIDD  - Consider increasing gabapentin to 300mg TID - limited other options     Hx of PE  - On Eliquis; Last CTPE 5/2024 negative      Bowel and bladder dysfunction  - has been chronically incontinent since after chemo per family; he usually is able to tell them when he is incontinent and they have been able to maintain hygiene   - Sheppard in place currently  - Incontinent stool 10/14  - Ensure adequate hygiene  - Monitor for diarrhea/constipation      Agitation, restlessness, insomnia, hx of anxiety and bipolar  (Recently administered T IM with worsening symptoms at home)  - Seroquel 25mg HS  - On Valium 5mg TID, ATC oxy 5mg Q6H  - Consider increasing gabapentin to 300mg TID with hope to back down other agents in future      Encounter for skin care; R hand skin traumatic wound  - Wound care 10/17  \"traumatic wound from patient hitting hand against bedside rail, now full thickness with yellow slough tissue, periwound with edema and erythema, scant serosanguineous drainage. Cleanse right hand with normal saline, apply normalgel to wound bed, cover with silicone bordered foam. Change daily and PRN soilage/displacement. \"  - Increased risk of skin wounds/breakdown/rashes due to recent immobility and co-morbidities   - Turn patient Q2H in bed (use pillows or wedges)  - Patient and if available caregiver education   - Hydragaurd to buttocks and sacrum BID & PRN  - " Allevyn foam to heels and float heels when in bed   - Optimal bowel/bladder hygiene; keep skin clean and dry   - EHOB waffle cushion to chair/WC when OOB  - Nursing to document in chart and Notify MD if buttock, sacrum, heel, or other skin site develops erythema, skin breakdown, or rashes as soon as possible.  If patient is soiling themselves with urine or stool notify MD.  If you are unable to maintain skin integrity and prevent erythema due to frequency of soiling notify MD as soon as possible as well.      VTE prophylaxis   As outlined by primary team      Other medical issues:     Per primary service (and relevant consultants if applicable)    Objective:    Allergies per EMR    Physical Exam:  Temp:  [96.7 °F (35.9 °C)-98.6 °F (37 °C)] 96.7 °F (35.9 °C)  HR:  [48-86] 73  Resp:  [14-19] 14  BP: ()/() 122/81  SpO2:  [84 %-99 %] 97 %    General: Lying in bed - trached/vent   HENT: tongue somewhat protuding at times  Respiratory: Vent  Cardiovascular: Regular rate   Gastrointestinal: Soft, non-distended, normoactive, nontender  SkiN/MSK/Extremities:  No calf edema, no calf tenderness to palpation  Neurologic/Psych:   eyes open to voice/spontaneous, he is able to track, he was able to squeeze both hands to command and show a few fingers in R hand to command as well as slightly move legs/toes to verbal command.  Unable to adequately communicate via head nodding, finger/hand, or other means at this time.  (No increased tone, reflexes remain depressed)  BUE EF/EE 2-, FF 2+ to 4  BLE 1 to 2-      Diagnostic Studies: Reviewed  XR chest portable ICU   Final Result by Joan Mcintyre MD (10/17 2027)      Persistent extensive opacity in the left lower lobe with loss of the diaphragm which could be due to atelectasis and/or pneumonia in the appropriate clinical setting.            Workstation performed: [id number]         XR chest portable   Final Result by Tobias Leon MD (10/17 8588)      Study  limited by low lung volumes. Persistent dense  left  lung base opacity which may represent a combination of effusion and parenchymal opacity.            Workstation performed: [id number]         MRI brain w wo contrast   Final Result by Andi Mccloud MD (10/17 0112)      Mildly restricted diffusion in the splenium of the corpus callosum and more subtly in the anterior cingulate gyri. Possible sequela of hypoxic ischemic injury, inflammatory or infectious process.      The study was marked in EPIC for immediate notification..      Workstation performed: [id number]         XR chest portable ICU   Final Result by Jewel Burton MD (10/15 0521)      Opacification at both lung bases is unchanged likely due to atelectasis. Infiltrates and pleural effusions not excluded.            Workstation performed: [id number]         CT chest abdomen pelvis w contrast   Final Result by Philippe Coates MD (10/11 1426)         1. Moderate bibasilar pulmonary opacification consistent with atelectasis; superimposed pneumonia not entirely excludable. Trace left greater than right pleural effusions.   2. No acute abnormality identified in the abdomen or pelvis.   3. Additional findings as noted.               Workstation performed: [id number]         XR chest portable ICU   Final Result by Brendon Snyder MD (10/11 0356)      Left basilar effusion and atelectasis redemonstrated. Milder right basilar effusion/subsegmental atelectasis, also similar.         Workstation performed: [id number]         CT head wo contrast   Final Result by Andi Mccloud MD (10/09 2001)      No acute intracranial abnormality.                  Workstation performed: [id number]         XR chest portable ICU   Final Result by Andrea Bai MD (10/09 0823)      Left basilar effusion and atelectasis redemonstrated. Milder right basilar effusion/subsegmental atelectasis, also similar.            Workstation performed: [id number]          XR chest portable ICU   Final Result by Carlos Atkinson MD (10/07 1303)      Near complete opacification of the left hemithorax with leftward mediastinal shift, mildly improved from 10/5/2024, suggesting baseline underlying atelectasis and pleural effusion.      Note, left lung pneumonia cannot be excluded.      No pneumothorax.            Resident: SONAM KNOX I, the attending radiologist, have reviewed the images and agree with the final report above.      Workstation performed: [id number]         MRI brain w wo contrast    (Results Pending)   XR chest portable    (Results Pending)       Laboratory: Reviewed  Results from last 7 days   Lab Units 10/21/24  0406 10/20/24  0244 10/19/24  0330   HEMOGLOBIN g/dL 10.5* 10.5* 10.7*   HEMATOCRIT % 33.8* 34.0* 33.5*   WBC Thousand/uL 12.08* 9.39 12.12*     Results from last 7 days   Lab Units 10/21/24  0406 10/20/24  0244 10/19/24  0330   BUN mg/dL 11 12 14   SODIUM mmol/L 135 135 135   POTASSIUM mmol/L 4.0 3.9 4.4   CHLORIDE mmol/L 100 103 107   CREATININE mg/dL 0.44* 0.50* 0.58*            Drug regimen reviewed, all potential adverse effects identified and addressed:    Scheduled Meds:  Current Facility-Administered Medications   Medication Dose Route Frequency Provider Last Rate    acetaminophen  650 mg Per PEG Tube Q6H PRN Ne Low MD      apixaban  5 mg Per PEG Tube BID Mohsen Moon MD      bisacodyl  10 mg Rectal Daily PRN Ne Low MD      chlorhexidine  15 mL Mouth/Throat Q12H [facility] Mohsen Moon MD      diazepam  5 mg Intravenous Q8H ISAEL Ne Low MD      fentaNYL  100 mcg Intravenous Once PRN Dominik Raman MD      gabapentin  100 mg Per PEG Tube TID Ne Low MD      guaiFENesin  400 mg Per G Tube Q6H Tay Eng PA-C      levalbuterol  1.25 mg Nebulization Q6H Silvio Alas MD      LORazepam  2 mg Intravenous Q4H PRN Grazyna Goldstein PA-C      melatonin   3 mg Per PEG Tube HS [facility]      midazolam  4 mg Intravenous Once PRN Dominik Raman MD      midodrine  10 mg Per PEG Tube Q8H Dominik Raman MD      oxyCODONE  5 mg Per PEG Tube Q6H El Sams MD      sodium chloride  4 mL Nebulization Q6H John Sutherland MD         ** Please Note: Fluency Direct voice to text software may have been used in the creation of this document. **    50 minutes or greater spent for this encounter which included a combination of face-to-face time with Hemanth Swanson and non-face-to-face time which in part specifically includes management of resp failure, weakness.  Face-to-face time included extended discussion with patient regarding current condition, medical history, mood, medical/rehabilitation management, and disposition.  Non face-to-face time included coordination of care with patient's co-managing AP and/or physician(s) thru communication and review of their recent documentation as well as reviewing vitals, bowel/bladder function, recent labs, and notes from therapy, CM, and nursing.        Thank you for allowing the PM&R service to participate in the care of this patient. We will continue to follow. Please do not hesitate to call with questions or concerns.     Kilo Jennings MD, MS  [facility]  Physical Medicine and Rehabilitation  Brain Injury Medicine

## 2024-10-22 ENCOUNTER — TELEPHONE (OUTPATIENT)
Age: 39
End: 2024-10-22

## 2024-10-22 LAB
ANION GAP SERPL CALCULATED.3IONS-SCNC: 0 MMOL/L (ref 4–13)
BASOPHILS # BLD AUTO: 0.03 THOUSANDS/ΜL (ref 0–0.1)
BASOPHILS NFR BLD AUTO: 0 % (ref 0–1)
BUN SERPL-MCNC: 10 MG/DL (ref 5–25)
CA-I BLD-SCNC: 1.13 MMOL/L (ref 1.12–1.32)
CALCIUM SERPL-MCNC: 8.5 MG/DL (ref 8.4–10.2)
CHLORIDE SERPL-SCNC: 100 MMOL/L (ref 96–108)
CO2 SERPL-SCNC: 34 MMOL/L (ref 21–32)
CREAT SERPL-MCNC: 0.43 MG/DL (ref 0.6–1.3)
EOSINOPHIL # BLD AUTO: 0.08 THOUSAND/ΜL (ref 0–0.61)
EOSINOPHIL NFR BLD AUTO: 1 % (ref 0–6)
ERYTHROCYTE [DISTWIDTH] IN BLOOD BY AUTOMATED COUNT: 18.9 % (ref 11.6–15.1)
GFR SERPL CREATININE-BSD FRML MDRD: 147 ML/MIN/1.73SQ M
GLUCOSE SERPL-MCNC: 113 MG/DL (ref 65–140)
HCT VFR BLD AUTO: 33.1 % (ref 36.5–49.3)
HGB BLD-MCNC: 10.3 G/DL (ref 12–17)
IMM GRANULOCYTES # BLD AUTO: 0.14 THOUSAND/UL (ref 0–0.2)
IMM GRANULOCYTES NFR BLD AUTO: 1 % (ref 0–2)
LYMPHOCYTES # BLD AUTO: 0.96 THOUSANDS/ΜL (ref 0.6–4.47)
LYMPHOCYTES NFR BLD AUTO: 10 % (ref 14–44)
MAGNESIUM SERPL-MCNC: 1.9 MG/DL (ref 1.9–2.7)
MCH RBC QN AUTO: 30.7 PG (ref 26.8–34.3)
MCHC RBC AUTO-ENTMCNC: 31.1 G/DL (ref 31.4–37.4)
MCV RBC AUTO: 99 FL (ref 82–98)
MONOCYTES # BLD AUTO: 0.42 THOUSAND/ΜL (ref 0.17–1.22)
MONOCYTES NFR BLD AUTO: 4 % (ref 4–12)
NEUTROPHILS # BLD AUTO: 8.18 THOUSANDS/ΜL (ref 1.85–7.62)
NEUTS SEG NFR BLD AUTO: 84 % (ref 43–75)
NRBC BLD AUTO-RTO: 0 /100 WBCS
PHOSPHATE SERPL-MCNC: 3.4 MG/DL (ref 2.7–4.5)
PLATELET # BLD AUTO: 209 THOUSANDS/UL (ref 149–390)
PMV BLD AUTO: 10.9 FL (ref 8.9–12.7)
POTASSIUM SERPL-SCNC: 4.1 MMOL/L (ref 3.5–5.3)
RBC # BLD AUTO: 3.36 MILLION/UL (ref 3.88–5.62)
SODIUM SERPL-SCNC: 134 MMOL/L (ref 135–147)
WBC # BLD AUTO: 9.81 THOUSAND/UL (ref 4.31–10.16)

## 2024-10-22 PROCEDURE — 99233 SBSQ HOSP IP/OBS HIGH 50: CPT

## 2024-10-22 PROCEDURE — 31615 TRCHEOBRNCHSC EST TRACHS INC: CPT | Performed by: STUDENT IN AN ORGANIZED HEALTH CARE EDUCATION/TRAINING PROGRAM

## 2024-10-22 PROCEDURE — NC001 PR NO CHARGE: Performed by: STUDENT IN AN ORGANIZED HEALTH CARE EDUCATION/TRAINING PROGRAM

## 2024-10-22 PROCEDURE — 94150 VITAL CAPACITY TEST: CPT

## 2024-10-22 PROCEDURE — 0B938ZZ DRAINAGE OF RIGHT MAIN BRONCHUS, VIA NATURAL OR ARTIFICIAL OPENING ENDOSCOPIC: ICD-10-PCS | Performed by: STUDENT IN AN ORGANIZED HEALTH CARE EDUCATION/TRAINING PROGRAM

## 2024-10-22 PROCEDURE — 94669 MECHANICAL CHEST WALL OSCILL: CPT

## 2024-10-22 PROCEDURE — 82330 ASSAY OF CALCIUM: CPT

## 2024-10-22 PROCEDURE — 94003 VENT MGMT INPAT SUBQ DAY: CPT

## 2024-10-22 PROCEDURE — 94760 N-INVAS EAR/PLS OXIMETRY 1: CPT

## 2024-10-22 PROCEDURE — 99232 SBSQ HOSP IP/OBS MODERATE 35: CPT | Performed by: STUDENT IN AN ORGANIZED HEALTH CARE EDUCATION/TRAINING PROGRAM

## 2024-10-22 PROCEDURE — 94640 AIRWAY INHALATION TREATMENT: CPT

## 2024-10-22 PROCEDURE — 83735 ASSAY OF MAGNESIUM: CPT

## 2024-10-22 PROCEDURE — 80048 BASIC METABOLIC PNL TOTAL CA: CPT

## 2024-10-22 PROCEDURE — 0B978ZZ DRAINAGE OF LEFT MAIN BRONCHUS, VIA NATURAL OR ARTIFICIAL OPENING ENDOSCOPIC: ICD-10-PCS | Performed by: STUDENT IN AN ORGANIZED HEALTH CARE EDUCATION/TRAINING PROGRAM

## 2024-10-22 PROCEDURE — 94664 DEMO&/EVAL PT USE INHALER: CPT

## 2024-10-22 PROCEDURE — 84100 ASSAY OF PHOSPHORUS: CPT

## 2024-10-22 PROCEDURE — 85025 COMPLETE CBC W/AUTO DIFF WBC: CPT

## 2024-10-22 RX ORDER — FENTANYL CITRATE 50 UG/ML
50 INJECTION, SOLUTION INTRAMUSCULAR; INTRAVENOUS
Status: DISCONTINUED | OUTPATIENT
Start: 2024-10-22 | End: 2024-10-24

## 2024-10-22 RX ORDER — VECURONIUM BROMIDE 1 MG/ML
0.1 INJECTION, POWDER, LYOPHILIZED, FOR SOLUTION INTRAVENOUS ONCE
Status: CANCELLED | OUTPATIENT
Start: 2024-10-22 | End: 2024-10-22

## 2024-10-22 RX ORDER — PROPOFOL 10 MG/ML
5-50 INJECTION, EMULSION INTRAVENOUS
Status: DISCONTINUED | OUTPATIENT
Start: 2024-10-22 | End: 2024-10-23

## 2024-10-22 RX ORDER — LIDOCAINE HYDROCHLORIDE 10 MG/ML
INJECTION, SOLUTION EPIDURAL; INFILTRATION; INTRACAUDAL; PERINEURAL
Status: DISPENSED
Start: 2024-10-22 | End: 2024-10-23

## 2024-10-22 RX ORDER — GABAPENTIN 250 MG/5ML
300 SOLUTION ORAL EVERY 8 HOURS
Status: DISCONTINUED | OUTPATIENT
Start: 2024-10-22 | End: 2024-10-28

## 2024-10-22 RX ORDER — MAGNESIUM SULFATE HEPTAHYDRATE 40 MG/ML
2 INJECTION, SOLUTION INTRAVENOUS ONCE
Status: COMPLETED | OUTPATIENT
Start: 2024-10-22 | End: 2024-10-22

## 2024-10-22 RX ADMIN — GABAPENTIN 300 MG: 250 SOLUTION ORAL at 17:08

## 2024-10-22 RX ADMIN — LEVALBUTEROL HYDROCHLORIDE 1.25 MG: 1.25 SOLUTION RESPIRATORY (INHALATION) at 14:51

## 2024-10-22 RX ADMIN — FENTANYL CITRATE 50 MCG: 50 INJECTION INTRAMUSCULAR; INTRAVENOUS at 16:04

## 2024-10-22 RX ADMIN — MIDODRINE HYDROCHLORIDE 10 MG: 5 TABLET ORAL at 17:08

## 2024-10-22 RX ADMIN — GUAIFENESIN 400 MG: 200 SOLUTION ORAL at 11:04

## 2024-10-22 RX ADMIN — MIDODRINE HYDROCHLORIDE 10 MG: 5 TABLET ORAL at 08:09

## 2024-10-22 RX ADMIN — LEVALBUTEROL HYDROCHLORIDE 1.25 MG: 1.25 SOLUTION RESPIRATORY (INHALATION) at 08:22

## 2024-10-22 RX ADMIN — LORAZEPAM 2 MG: 2 INJECTION INTRAMUSCULAR; INTRAVENOUS at 09:45

## 2024-10-22 RX ADMIN — Medication 4 ML: at 08:22

## 2024-10-22 RX ADMIN — MIDODRINE HYDROCHLORIDE 10 MG: 5 TABLET ORAL at 00:04

## 2024-10-22 RX ADMIN — GUAIFENESIN 400 MG: 200 SOLUTION ORAL at 05:28

## 2024-10-22 RX ADMIN — GUAIFENESIN 400 MG: 200 SOLUTION ORAL at 00:04

## 2024-10-22 RX ADMIN — Medication 3 MG: at 21:04

## 2024-10-22 RX ADMIN — Medication 4 ML: at 14:51

## 2024-10-22 RX ADMIN — LEVALBUTEROL HYDROCHLORIDE 1.25 MG: 1.25 SOLUTION RESPIRATORY (INHALATION) at 01:13

## 2024-10-22 RX ADMIN — GUAIFENESIN 400 MG: 200 SOLUTION ORAL at 17:08

## 2024-10-22 RX ADMIN — GABAPENTIN 200 MG: 250 SOLUTION ORAL at 08:12

## 2024-10-22 RX ADMIN — APIXABAN 5 MG: 5 TABLET, FILM COATED ORAL at 17:08

## 2024-10-22 RX ADMIN — APIXABAN 5 MG: 5 TABLET, FILM COATED ORAL at 08:09

## 2024-10-22 RX ADMIN — Medication 4 ML: at 19:58

## 2024-10-22 RX ADMIN — CHLORHEXIDINE GLUCONATE 15 ML: 1.2 RINSE ORAL at 21:04

## 2024-10-22 RX ADMIN — Medication 4 ML: at 01:13

## 2024-10-22 RX ADMIN — GABAPENTIN 200 MG: 250 SOLUTION ORAL at 00:04

## 2024-10-22 RX ADMIN — CHLORHEXIDINE GLUCONATE 15 ML: 1.2 RINSE ORAL at 08:09

## 2024-10-22 RX ADMIN — MAGNESIUM SULFATE HEPTAHYDRATE 2 G: 40 INJECTION, SOLUTION INTRAVENOUS at 08:09

## 2024-10-22 RX ADMIN — LEVALBUTEROL HYDROCHLORIDE 1.25 MG: 1.25 SOLUTION RESPIRATORY (INHALATION) at 19:58

## 2024-10-22 NOTE — RESPIRATORY THERAPY NOTE
Respiratory- Bronchoscopy Note    Hemanth Swanson 39 y.o. male MRN: [id number]  Unit/Bed#: ICU 05  Encounter: [id number]      PROCEDURE: Bronchoscopy    DATE:October 22, 2024    LOCATION: Bedside In ICU       Assisted standard bronchoscopy, with Dr. Coleman. RN provided PRN sedation, a standard disposable bronchoscope was inserted thru the trach and advanced to the level of the prashant. Pt airwas cleaned and a total of 80cc  of 0.9% saline injected for washings. Trap had a return of 60+cc. Pt tolerated procedure well and was returned back to fio2 of 40% on vent once  procedure completed.         SPECIMENS: no samples needed to be sent    COMPLICATIONS:  No known complications at this time.

## 2024-10-22 NOTE — OCCUPATIONAL THERAPY NOTE
Occupational Therapy Cancellation Note     Patient Name: Hemanth Swanson  Today's Date: 10/22/2024     10/22/24 1030   Note Type   Note Type Cancelled Session   Cancel Reasons Medical status  (OT consult remains active, however per JEAN-PAUL Neil during ICU mobility rounds, pt remains inappropriate for participation in OT evaluation d/t medical status. Will continue to hold and f/u as able.)     Merced Forte MS OTR/L   [place] Licensure# [id number]

## 2024-10-22 NOTE — NURSING NOTE
Patient desaturating into low 20's multiple times this morning. Ventilator unable to deliver breaths and manual bagging needed. Patient saturations recovered quickly. Respiratory and Resident [place] made aware. No new orders.

## 2024-10-22 NOTE — PROGRESS NOTES
"Physical Medicine and Rehabilitation Progress Note  Hemanth Swanson 39 y.o. male MRN: [id number]  Unit/Bed#: ICU 05 Encounter: [id number]      Chief Complaints:  Resp failure     Subjective/eval:     (Patient received 2mg ativan IV 1.5 h earlier; required multiple episodes of bagging for desat - apparently helped with ativan)     On eval, patient able to open eyes to voice, pupils reactive, he is able to track some still, he continues to be able to squeeze both hands to command and slightly move legs, feet, and toes; will fall asleep fairly quickly. Unable to stay awake long enough to confirm ability to communicate adequately via head nodding, finger/hand, or other means at this time.  (No increased tone, reflexes remain depressed)    ROS: Could not be adequately (or fully adequately be) obtained due to degree of impaired cognition/communication at time of encounter.    Assessment & Plan:     39-year-old male with a past medical history of metastatic left testicular seminoma, status post left orchiectomy, chemotherapy, hypertension, anxiety, bipolar disorder, who developed hearing loss possible left-sided vestibular schwannoma, neuromuscular weakness, diplopia, bowel bladder incontinence following chemotherapy which was stopped.  Patient had extended hospital stay in August to September 2023 for this which also included respiratory failure requiring trach and PEG pulmonary embolism for which patient continues chronic trach/PEG status.  Patient also had hospitalization 5/2024 for multilobar PNA, hypoxic bradycardia felt 2/2 vasal vagal stimulation increased secretions and position of trach with mucous plugging c/b cardiac arrest with pulseless asystole without hypoxia, seizure like actiity felt to not be seizure activity helped apparently with managing secretions and keeping O2 above 90%.  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  There was no no electrodiagnostic evidence to support a diffuse neurogenic process such as motor neuron disease or a myopathic process affecting the peripheral nervous system.  Last OP neuro note 7/2024, \"given the lack of an obvious cause of his respiratory failure it is reasonable to consider neuromuscular disease. However, there is no evidence that this is the case. Specifically, there is nothing to suggest primary muscle disease, primary nerve disease, or neuromuscular junction disorders.\"      Of note,  patient was eating and drinking orally and they would not provide PEG tube feeds if eating or drinking adequately.  Patient would not drink plain water and drank mostly flavored drinks and sometimes soda per family.      Per family he had slow decline in function but overall was stable last few months at home with them managing him with trach/peg status.  He did have some hot flashes and sweating at home and was seen by urology late Aug and was started on IM testosterone biweekly for over a month.  He was noted to have increased agitation/restlessness for a week or so (per family he did not have these prior) and then significant more agitation and anger and was recommended to hold testosterone with last dose 9/30.         On 10/5, patient apparently pulled out PEG during bout of agitation and presented to hospital where G tube was replaced but he was noted to be hypoxic with copious secretions and febrile.  He required adjustments to ventilation.  He was found to have complete L sided consolidation along with severe hypernatremia 176.  Patient was treated for PNA and sepsis with Abx.  Patient bronch culture + Proteus and pseudomonas and remains on Zosyn.  Leukocytosis improved " "10/14.  Patient received IVF and nephro consulted for hypernatremia which was felt to be 2/2 dehydration which has been improving.  He had episode of rhythmic body jerking for several minutes on 10/10 and additional shaking episodes with hypoxia and bradycardia with hypotension requiring pressors.  He was placed on vEEG which captures some of these shaking spells which did not correlate with EEG seizures.  Neuro felt spells may be due to vagal response and possibly convulsive syncope in setting of hypoxia/bradycardia with possible functional component.  They did not feel he needed AED.       Significant episode of L lung mucous plugging requiring bronch suctioning 10/15; TTE 10/15 normal EF, diastolic function, R ventricle moderately dilated and new compared with 6/2024 study.   Florinef d/c'd; Remains on midodrine 15mg TID; remains on Oxy 5mg Q6H ATC; seroquel 25mg HS.  Required multiple doses of benzo on 10/16 for episodes of agitation/distress and need for sedation with planned MRI brain.     10/16, Discussion with CCM attending and concern for ongoing refractory respiratory failure and likely neuromuscular weakness significantly impacting resp muscles/diaphragm; given this degree of weakness, lack of available effective treatments, he is at risk for ongoing difficulty clearing secretions, mucous plugging, PNA, and respiratory collapse even with optimal ventilation.       10/16 MRI brain showed Mildly restricted diffusion in the splenium of the corpus callosum and more subtly in the anterior cingulate gyri. Possible sequela of hypoxic ischemic injury, inflammatory or infectious process. Per neuro \" with abnormal lesion in the splenium of corpus callosum (without post contrast enhancement), thus neurology asked to weigh in on MRI findings (CLOCC; cytotoxic lesion of corpus collosum, can carry broad spectrum of possible etiologies).\"Unclear etiology at this time. Would recommend repeat MRI in 1 month to re-evaluate " "findings.\"    10/21 CXR Persistent left basilar opacity may represent atelectasis and or pneumonia. Prominent pulmonary vasculature may be accentuated by low lung volumes versus mild volume overload. Required bronch     10/22 desat'ing multiple times requiring bagging; apparently helped after IV ativan; plan for additional bronch today     Prior Level of Function and Social history:    Patient lives at home with family - sister Dmitry and Aunt Magaly who provide caregiving.  He was able to move arms and legs but was weak throughout.  He was able to type on computer and phone and follow simple commands and voice words.  Patient was eating and drinking orally and they would not provide PEG tube feeds if eating or drinking adequately.  Patient would not drink plain water and drank mostly flavored drinks and sometimes soda.  He could help some with dressing.  He is chronically incontinent of b/b but would notifiy CG's/family when he went.  Overall though was dependent with ADLs and mobility; WVC, Wallace lift, Electric recliner; In past he could get to commode but not last several months.       Current Level of Function:    Dependent     Acute on Chronic respiratory failure with hypoxia  Neuromuscular weakness  Sepsis  AMS/encephalopathy  Diaphoresis   Hypotension  Bradycardia  Elevated Testosterone; hx of hypogonadism   Metastatic left testicular seminoma, status post left orchiectomy, chemotherapy  Trach dependent  PEG status  Hx of PE  Pneumonia  Hypernatremia  Seizure like episodes  Bowel and bladder dysfunction   Agitation, restlessness, insomnia, hx of anxiety and bipolar  Encounter for skin care     Dispo rec:  TBD - family would like patient to be stabilized and come home but currently not stable enough for safe discharge home and is at increased risk of mortality; close follow-up with palliative/CCM and family appropriate      Acute on Chronic respiratory failure with hypoxia on chronic vent, Sepsis, PNA  - 10/22 - " multiple de-sats requiring multiple bagging; apparently helped with IV ativan x1; labs stable, pt afebrile; plan for another bronch today  - Consider increasing gabapentin to 300mg TID with goal to wean of benzo's at some point  - Consider MRI C spine with and without contrast for ongoing respiratory failure, diaphragmatic weakness, and 4 limb weakness (reflexes chronically down but that could be from concurrent peripheral neuropathy)      - 10/21 - bedside bronch during which patient desaturated; gram stain 2+GPR, 1+GNR  - 10/21 - pt afeb, increased WBC  10/21 10/21 CXR apparently worsening and plan for repeat bronch today?.  Did require brief bagging as well.  - 10/18 Zosyn/Abx course completed   - 10/17 trach exchanged for issues with balloon  - Started on valium and gabapentin for repeated episode of inability to tolerate vent   - 10/15 Significant episode of L lung mucous plugging requiring bronch suctioning 10/15;   - Concern for ongoing refractory respiratory failure and likely neuromuscular weakness significantly impacting resp muscles/diaphragm; given this degree of weakness, lack of available effective treatments, he is at risk for ongoing difficulty clearing secretions, mucous plugging, PNA, and respiratory collapse even with optimal ventilation  - TTE 10/15 normal EF, diastolic function, R ventricle moderately dilated and new compared with 6/2024 study  - 10/5 P/w complete opacification of L hemithorax, hypoxic with copious secretions, fever following increasing bouts of agitation and pulling out peg in context of recent initiation of IM testosterone (unclear relation)  - he had last available swallow  studies in 10 and 11/2023 and were unremarkable and family has been providing oral feeds/oral fluids mostly at home   - 5/2024 hospitalization for multilobar PNA, hypoxic bradycardia felt 2/2 vasal vagal stimulation increased secretions and position of trach with mucous plugging c/b cardiac arrest with  "pulseless asystole without hypoxia, seizure like actiity felt to not be seizure activity helped apparently with managing secretions and keeping O2 above 90%  - Ensure appropriate mgmt of secretions and trach positioning   - Currently NPO status/TF only - Ensure SLP eval and MBS study prior to any future considerations for oral intake   - 10/14 Having intermittent bouts of increased PIP - sometime corresponding to diaphoresis and possibly \"fighting vent\"/anxiety, agitation per discussion with staff; had elevated PIP/low VT improved with suction of thick yellow mucous 10/14; did receive IV ativan with brief improvement earlier in day; oxy 5 x1 with little improvement   - Repeat CXR 10/14 pending; prior CXRs showing L basilar effusion and atelectasis    - CT C 10/11 - Moderate bibasilar pulmonary opacification consistent with atelectasis; superimposed pneumonia not entirely excludable. Trace left greater than right pleural effusions.   - Last CTPE study 5/30 negative for PE   - Overall sedation per [facility]              - Oxy 5mg Q6H PRN    - Receiving Ativan 2mg IV PRN > received 2 doses last 2 days and 1 today     - Diazepam standing stopped              - Gabapentin 200mg consider increasing to 300mg TID - and potentially trying to wean PRN benzo's (otherwise consider longer acting benzo)   - Treated recently for PNA, possible atelectasis -  - Was on multiple agents for BP support -    - Now only midodrine  - Chronic resp failure etiology uncertain - neuro work-up inconclusive   - Overall mgmt and sedation per [facility] - fentanyl stopped 10/12, receiving only Seroquel 25mg HS, 2mg Ativan Q4H PRN 2 doses 10/13 and 1 dose 10/14 sofar   - Monitor vent values, other vitals, labs, lung exam, overall exam, secretions closely  - Suction PRN when indicated  - Monitor for ventilator associated complications     Neuromuscular weakness, AMS/encephalopathy   - Strength recently  BUE EF/EE 2-, FF 2+ to 4; BLE 1 to 2-   - OP Neuro exam " "7/17 - Strength at least 4+/5 in all muscles of arms and legs proximally and distally (Absent DTR)  - Recommend MRI C spine with and without contrast to eval s/s of myelitis/SCI  -  Patient did have restricted diffusion in splenium of CC on recent contrasted brain MRI  - pt does not have evidence of UMN signs of exam however last IP neuro note felt it was reasonable to obtain as well and if patient has/had concurrent peripheral neuropathy this may dampen expected heightened reflexes and he continues with significant 4 limb weakness with diaphragmatic weakness   - High risk of unexplained neuromuscular d/o driving resp failure at this point - without significant effective treatments   - Zinc wnl   - Selenium pending to eval for myoencephalopathy  - F/u paraneoplastic panel panel pending  - risk of encephalopathy or other cause as well as progression of underlying condition of uncertain etiology  - Consider repeat overall EMG/NCS to look for active demyelination/axonopathy and to include phrenic nerve testing (if phrenic nerve not involved could discuss case with KIERSTEN/Brian to see if he would be candidate for diaphragmatic pacing) - follow-up with neurology as needed   - 10/16 MRI brain showed Mildly restricted diffusion in the splenium of the corpus callosum and more subtly in the anterior cingulate gyri. Possible sequela of hypoxic ischemic injury, inflammatory or infectious process. Per neuro \" with abnormal lesion in the splenium of corpus callosum (without post contrast enhancement), thus neurology asked to weigh in on MRI findings (CLOCC; cytotoxic lesion of corpus collosum, can carry broad spectrum of possible etiologies).\"\"Unclear etiology at this time. Would recommend repeat MRI in 1 month to re-evaluate findings.\"  - Per neuro 10/19 \"neurohospitalist Dr. Geiger, d/w'ed Dr. Sams - reasonable to consider MRI c-spine w/wo contrast, given c/f bilateral diaphragm paralysis and evaluate for any structural " "abnormalities that could be impacting phrenic nerves. If obtained, please reach out to us if further input needed. \"  - Requires 24-7 caregivers and dependent with ADLs and mobility   - flaccid tone overall, generalized weakness, could track and move all extremities some to command but not consistently   - Monitor neuro exam and cosider  - While patient at current functional level would also focus on prevention or improvement of complications (malnutrition, dehydration, PNA, atelectasis, VTE, constipation/obstruction, urinary retention, UTI, ulcerations, contractures).  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  There was no no electrodiagnostic evidence to support a diffuse neurogenic process such as motor neuron disease or a myopathic process affecting the peripheral nervous system.  Last OP neuro note 7/2024, \"given the lack of an obvious cause of his respiratory failure it is reasonable to consider neuromuscular disease. However, there is no evidence that this is the case. Specifically, there is nothing to suggest primary muscle disease, primary nerve disease, or neuromuscular junction disorders.\"      Hypotension, bradycardia   - BP improved from earlier  - Prior hospitalization 5/2024 for multilobar PNA \"noted hypoxic bradycardia felt 2/2 vasal vagal stimulation increased " "secretions and position of trach with mucous plugging\"  - Ensure appropriate mgmt of secretions and trach positioning   - Only on midodrine 10mg TID now - was not on at home  (Was on multiple agents for BP support recently - (levophed stopped first, than hydrocortisone and most recently florinef; ensure adequate hydration (BUN/Cr appropriate, sodium improved)- Flaccid tone mostly, did have some dilated reactive pupils during episode of diaphoresis and resp distress on vent - presentation and hx not c/w traditional autonomic storming  - Ensure mgmt of infection or other causes    - TTE 10/15 normal EF, diastolic function, R ventricle moderately dilated and new compared with 6/2024 study  - Completed Zosyn course 10/18  - Fluids per CCM - 200 cc free water Q4H ; TFs     Elevated Testosterone (Endocrinopathy); hx of hypogonadism   - Patient received T Cypionate 100mg IM biweekly for over a month with last dose 9/30 for hypogonadism earlier this month (T level prior 19) - this is long lasting T formulation and now 3 weeks since last IM dose  - 10/11 FT >50, Total T >1500 (Max) - still fairly high level for 11 days later (half life 8-12 d but can stay in body for several weeks gradually decreasing  - Gynecomastia  (can be conversion of T to E, steroids) - can lead to MI, CVA, heart failure, polycythemia, mood swings, agitation, anxiety, depression, tendon injury, sleep apnea, DM, Wt gain, cog decline  - Query if high T level cause bouts of agitation/aggression recently at home which is possible based on hx from family   - Mildly elevated prolactin   - FSH, LH both low; AFP, hCG negative  - He did have some hot flashes and sweating at home and was seen by urology late Aug and was started on IM testosterone biweekly for over a month.  He was noted to have increased agitation/restlessness for a week or so (per family he did not have these prior) and then significant more agitation and anger and was recommended to hold " "testosterone with last dose 9/30.      - Consider Endocrinology consult     Metastatic left testicular seminoma, status post left orchiectomy, chemotherapy  - Work-up and mgmt per CCM/H-O  - AFP, hCG negative  - CT C/A/P without suspicious findings      PEG status  - Monitor site, bowel function  - TF's per primary      Hypernatremia  - Remains improved felt to be lack of free water/dehydration per nephro; treated sepsis/PNA on admission  - Per family, patient was eating and drinking orally and they would not provide PEG tube feeds if eating or drinking adequately.  Patient would not drink plain water and drank mostly flavored drinks and sometimes soda per family.   - Fluid boluses and Tfs per CCM/nutrition   - Ensure caregiver education on appropriate intake/flushes and monitoring after d/c   - Monitor  - On 200 ml fluid boluses Q4H which is more than last nutrition note 10/7 > which would give  Close to 2500 ml water/d if getting full Tfs and flushes with meds;   - \"continuous tube feeds of Jevity 1.5 @ 70 ml/hr. Water flushes of 150 ml q 4 hrs. Start at 20 ml/hr, advance by 10 ml q 4 hrs as tolerated until goal rate is reached. Defer additional water flush adjustments to critical care d/t hypernatremia.At goal EN regimen provides 2520 kcals, 107 g protein, and 2177 ml total water from formula + flushes;      Seizure like episodes  - Neuro felt spells may be due to vagal response and possibly convulsive syncope in setting of hypoxia/bradycardia with possible functional component.  They did not feel he needed AED.    - episode of rhythmic body jerking for several minutes on 10/10 and additional shaking episodes with hypoxia and bradycardia with hypotension requiring pressors.  He was placed on vEEG which captures some of these shaking spells which did not correlate with EEG seizure  -5/2024 hospitalization - also had seizure like actiity felt to not be seizure activity helped apparently with \"managing secretions " "and keeping O2 above 90%\"  - Consider increasing gabapentin to 300mg TID - limited other options  - Still getting PRN Ativan for desat episodes      Hx of PE  - On Eliquis; Last CTPE 5/2024 negative      Bowel and bladder dysfunction  - has been chronically incontinent since after chemo per family; he usually is able to tell them when he is incontinent and they have been able to maintain hygiene   - Sheppard in place currently  - Incontinent stool 10/14  - Ensure adequate hygiene  - Monitor for diarrhea/constipation      Agitation, restlessness, insomnia, hx of anxiety and bipolar  (Recently administered T IM with worsening symptoms at home)  - Consider increasing gabapentin to 300mg TID with hope to back down other agents in future   - Still getting PRN ativan      Encounter for skin care; R hand skin traumatic wound  - Wound care 10/17  \"traumatic wound from patient hitting hand against bedside rail, now full thickness with yellow slough tissue, periwound with edema and erythema, scant serosanguineous drainage. Cleanse right hand with normal saline, apply normalgel to wound bed, cover with silicone bordered foam. Change daily and PRN soilage/displacement. \"  - Increased risk of skin wounds/breakdown/rashes due to recent immobility and co-morbidities   - Turn patient Q2H in bed (use pillows or wedges)  - Patient and if available caregiver education   - Hydragaurd to buttocks and sacrum BID & PRN  - Allevyn foam to heels and float heels when in bed   - Optimal bowel/bladder hygiene; keep skin clean and dry   - EHOB waffle cushion to chair/WC when OOB  - Nursing to document in chart and Notify MD if buttock, sacrum, heel, or other skin site develops erythema, skin breakdown, or rashes as soon as possible.  If patient is soiling themselves with urine or stool notify MD.  If you are unable to maintain skin integrity and prevent erythema due to frequency of soiling notify MD as soon as possible as well.      VTE " prophylaxis   As outlined by primary team      Other medical issues:     Per primary service (and relevant consultants if applicable)    Objective:    Allergies per EMR    Physical Exam:  Temp:  [95 °F (35 °C)-98.5 °F (36.9 °C)] 98.5 °F (36.9 °C)  HR:  [51-89] 89  Resp:  [14] 14  BP: ()/(58-98) 119/74  SpO2:  [90 %-100 %] 95 %    General: Lying in bed - trached/vent   HENT: tongue somewhat protuding at times  Respiratory: Vent  Cardiovascular: Regular rate   Gastrointestinal: Soft, non-distended, normoactive, nontender  SkiN/MSK/Extremities:  No calf edema, no calf tenderness to palpation  Neurologic/Psych:   able to open to voice, pupils reactive, he is able to track some still, he continues to be able to squeeze both hands to command and slightly move legs, feet, and toes; will fall asleep fairly quickly. Unable to stay awake long enough to confirm ability to communicate adequately via head nodding, finger/hand, or other means at this time.   10/22 less ability to test as more tired - able to squeeze both hands 3-4, move toes, ankles, prox LE 1-2/5   10/21 BUE EF/EE 2-, FF 2+ to 4  BLE 1 to 2-      Diagnostic Studies: Reviewed  XR chest portable   Final Result by Quentin Brownlee DO (10/21 1622)      Limited study.      Persistent left basilar opacity may represent atelectasis and or pneumonia.      Prominent pulmonary vasculature may be accentuated by low lung volumes versus mild volume overload.      Workstation performed: [id number]         XR chest portable ICU   Final Result by Joan Mcintyre MD (10/17 2027)      Persistent extensive opacity in the left lower lobe with loss of the diaphragm which could be due to atelectasis and/or pneumonia in the appropriate clinical setting.            Workstation performed: [id number]         XR chest portable   Final Result by Tobias Leon MD (10/17 1328)      Study limited by low lung volumes. Persistent dense  left  lung base opacity which may  represent a combination of effusion and parenchymal opacity.            Workstation performed: [id number]         MRI brain w wo contrast   Final Result by Andi Mccloud MD (10/17 0112)      Mildly restricted diffusion in the splenium of the corpus callosum and more subtly in the anterior cingulate gyri. Possible sequela of hypoxic ischemic injury, inflammatory or infectious process.      The study was marked in EPIC for immediate notification..      Workstation performed: [id number]         XR chest portable ICU   Final Result by Jewel Burton MD (10/15 0521)      Opacification at both lung bases is unchanged likely due to atelectasis. Infiltrates and pleural effusions not excluded.            Workstation performed: [id number]         CT chest abdomen pelvis w contrast   Final Result by Philippe Coates MD (10/11 1426)         1. Moderate bibasilar pulmonary opacification consistent with atelectasis; superimposed pneumonia not entirely excludable. Trace left greater than right pleural effusions.   2. No acute abnormality identified in the abdomen or pelvis.   3. Additional findings as noted.               Workstation performed: [id number]         XR chest portable ICU   Final Result by Brendon Snyder MD (10/11 0356)      Left basilar effusion and atelectasis redemonstrated. Milder right basilar effusion/subsegmental atelectasis, also similar.         Workstation performed: [id number]         CT head wo contrast   Final Result by Andi Mccloud MD (10/09 2001)      No acute intracranial abnormality.                  Workstation performed: [id number]         XR chest portable ICU   Final Result by Andrea Bai MD (10/09 0823)      Left basilar effusion and atelectasis redemonstrated. Milder right basilar effusion/subsegmental atelectasis, also similar.            Workstation performed: [id number]         XR chest portable ICU   Final Result by Carlos Atkinson MD (10/07  1303)      Near complete opacification of the left hemithorax with leftward mediastinal shift, mildly improved from 10/5/2024, suggesting baseline underlying atelectasis and pleural effusion.      Note, left lung pneumonia cannot be excluded.      No pneumothorax.            Resident: SONAM KNOX I, the attending radiologist, have reviewed the images and agree with the final report above.      Workstation performed: [id number]         MRI brain w wo contrast    (Results Pending)       Laboratory: Reviewed  Results from last 7 days   Lab Units 10/22/24  0528 10/21/24  0406 10/20/24  0244   HEMOGLOBIN g/dL 10.3* 10.5* 10.5*   HEMATOCRIT % 33.1* 33.8* 34.0*   WBC Thousand/uL 9.81 12.08* 9.39     Results from last 7 days   Lab Units 10/22/24  0528 10/21/24  0406 10/20/24  0244   BUN mg/dL 10 11 12   SODIUM mmol/L 134* 135 135   POTASSIUM mmol/L 4.1 4.0 3.9   CHLORIDE mmol/L 100 100 103   CREATININE mg/dL 0.43* 0.44* 0.50*            Drug regimen reviewed, all potential adverse effects identified and addressed:    Scheduled Meds:  Current Facility-Administered Medications   Medication Dose Route Frequency Provider Last Rate    acetaminophen  650 mg Per PEG Tube Q6H PRN Ne Low MD      apixaban  5 mg Per PEG Tube BID Mohsen Moon MD      bisacodyl  10 mg Rectal Daily PRN Ne Low MD      chlorhexidine  15 mL Mouth/Throat Q12H [facility] Mohsen Moon MD      gabapentin  200 mg Per PEG Tube Q8H Trenton Pearce MD      guaiFENesin  400 mg Per G Tube Q6H Tay Eng PA-C      levalbuterol  1.25 mg Nebulization Q6H Silvio Alas MD      LORazepam  2 mg Intravenous Q4H PRN Grazyna Goldstein PA-C      magnesium sulfate  2 g Intravenous Once Trenton Pearce MD 2 g (10/22/24 0809)    melatonin  3 mg Per PEG Tube HS AMBER Cline      midodrine  10 mg Per PEG Tube Q8H Dominik Raman MD      oxyCODONE  5 mg Per PEG Tube Q6H PRN Jus Melendez MD       sodium chloride  4 mL Nebulization Q6H John Sutherland MD         ** Please Note: Fluency Direct voice to text software may have been used in the creation of this document. **      Thank you for allowing the PM&R service to participate in the care of this patient. We will continue to follow. Please do not hesitate to call with questions or concerns.     Kilo Jennings MD, MS  [facility]  Physical Medicine and Rehabilitation  Brain Injury Medicine

## 2024-10-22 NOTE — PHYSICAL THERAPY NOTE
Physical Therapy Cancellation Note       10/22/24 1027   Note Type   Note type Cancelled Session   Cancel Reasons Medical status   Additional Comments PT consult remains active, however per RN Rashaad during ICU mobility rounds, pt remains inappropriate for participation in PT evaluation d/t medical status. Will continue to hold and f/u as able.       Namrata Blas, PT, DPT   Available via [employer]  NPI # [id number]  [place] License - [id number]  10/22/2024

## 2024-10-22 NOTE — PROCEDURES
Bronchoscopy    Date/Time: 10/22/2024 4:12 PM    Performed by: Darek Louis DO  Authorized by: Miladis Ferguson MD    Patient location:  Bedside  Other Assisting Provider: Yes (comment) (Dr. Barksdale)    Consent:     Consent obtained:  Verbal    Consent given by:  Patient    Risks discussed:  Pneumothorax, bleeding and pain    Alternatives discussed:  No treatment  Universal protocol:     Patient identity confirmed:  Verbally with patient  Indications:     Procedure Purpose: therapeutic      Indications: pneumonia/infiltrate    Sedation:     Sedation type:  Anxiolysis  Anesthesia (see MAR for exact dosages):     Anesthesia method:  None  Scope passed:      Bronchoscopy findings location: Tracheostomy tube.  Upper Airway:     Trachea: normal      Faith:  Normal  Airway:     Airway:  Thick airway secretions in right and left mainstem bronchi  Procedure details:     Description:  Past bronchoscope through tracheostomy tube.  Use suction and saline wash to clear thick airway secretions.  Post-procedure details:     Chest x-ray performed: no      Patient tolerance of procedure:  Tolerated well, no immediate complications    Incomplete procedure: No

## 2024-10-22 NOTE — PROGRESS NOTES
Progress Note - Critical Care/ICU   Name: Hematnh Swanson 39 y.o. male I MRN: [id number]  Unit/Bed#: ICU 05 I Date of Admission: 10/5/2024   Date of Service: 10/22/2024 I Hospital Day: 17       Assessment & Plan  Toxic metabolic encephalopathy  Intermittent periods of agitation, restless however alert oriented x 4 without loss of consciousness over the past few days  MRI on 10/16 with hypoxic brain injury findings  Goals of care discussions were currently continues with disease treatment palliative care has been follow now signed off as goals of care are clear.  Neurology following recommendations to repeat paraneoplastic workup and an MRI in about a month  [id number]: Culture/stain 2+ poly, 2+ gram-positive rods, 1+ gram-negative rods growing.      Plan  Continue monitor mental status  Per family patient is at baseline events has been persistent over the past year  Regards to restless agitation bearing-down has responded to gabapentin and Ativan as needed  Continue monitoring metabolic derangement  Completed 10-day course of antibiotics for Pseudomonas and Proteus mirabilis pneumonia  Monitor off of antibiotics and re-evaluate as cultures become available  Chronic respiratory failure with hypoxia and hypercapnia (HCC)  Neuromuscular disease post chemotherapyS/P tracheostomy in 9/2023.  He continues to fail SBT likely in the setting of diaphragmatic weakness in the setting of progressive demyelinating/ neuromuscular disease  Goals of care discussions continues disease driven  X-ray showed worsening opacities 10/21  Multiple bronchoscopies resulted thick mucus secretions  Sputum/bronc culture showing growth of Pseudomonas (s/p 10 days treatment for Pseudomonas/Proteus)    Plan:  Continue vent support  Xopnex and hypertonic saline nebs 3 times daily  Consider bronchoscopy for symptomatic relief as needed    Bradycardia  Intermittent bradycardia not related to hypoxia.   He has been maintaining heart rates above  60 since medication adjustments.  Seroquel discontinue  Heart rate between episodes fluctuates from low 80s high 90s.  Continues to experience desaturations/bradycardic episodes likely vasovagal in nature  Removed scheduled benzodiazepines and opioids.    Plan  -Currently treating with Ativan as needed and manual bagging/suction   -Increased gabapentin to 200 mg 3 times daily  Sepsis (HCC)  Sepsis likely secondary to multifocal pneumonia in the setting of Proteus and Pseudomonas day 10 on antibiotics today for completion.   Now shock undifferentiated consider hypovolemic in the setting of insensible losses, echocardiogram has been remained unremarkable doubt sepsis at this time given adequate treatment on antibiotics for 10 days  Blood pressure remained with maps above 70 overnight.  He did not require pressors.  Will consider POCUS disease continuing evaluate IVC urine output has slowed down  Plan  If pressures are low, will trial of 500 cc Isolyte with monitoring urinary output.   Seizure-like activity (HCC)  No evidence of seizure on EMU on multiple admissions including current one  Seizure-like activity is likely stereotypical behaviors likely related to agitation; responding well to Ativan as needed  Umbilical hernia without obstruction and without gangrene  Evaluated by surgery.  Found to not be a surgical candidate due to ongoing comorbidities.  Fat containing incarcerated hernia without bowel.  Palliative care by specialist  Palliative care is following.  Had long discussion with family about goals of care.  They would like to continue all measures.  The are willing to care for him at home.  They reiterated their wishes that he would not like to be sent to a rehab facility.  Goals of care, counseling/discussion  Goals of care discussions multiple admissions including current 1 palliative care follow-up continues with disease driven treatments  Discussed with mother, sister patient unfortunately continues  with poor prognosis  Patient has 24/7 support at home  Case management following assistance upon discharge  Abnormal MRI  Noted with abnormal MRI concerning for hypoxic injury  Neurology on board recommendations to repeat paraneoplastic workup repeat MRI in about a month    Disposition: Critical care    ICU Core Measures     Vented Patient  VAP Bundle  VAP bundle ordered     A: Assess, Prevent, and Manage Pain Has pain been assessed? Yes  Need for changes to pain regimen? No   B: Both Spontaneous Awakening Trials (SATs) and Spontaneous Breathing Trials (SBTs) Plan to perform spontaneous awakening trial today? No secondary to vent dependent  Plan to perform spontaneous breathing trial today? No secondary to demyelination disorder  Obvious barriers to extubation? Yes   C: Choice of Sedation RASS Goal: 0 Alert and Calm  Need for changes to sedation or analgesia regimen? No   D: Delirium CAM-ICU: Negative   E: Early Mobility  Plan for early mobility? Yes   F: Family Engagement Plan for family engagement today? Yes       Review of Invasive Devices:      Central access plan: Medications requiring central line      Prophylaxis:  VTE VTE covered by:  apixaban, Per PEG Tube, 5 mg at 10/21/24 1748       Stress Ulcer  not ordered         24 Hour Events : 2 PM required Ativan as needed for vasovagal episode; 1 vasovagal episode overnight but no Ativan required  Subjective patient alert doing well calm and able to follow all commands AAOx4      Objective :                   Vitals I/O      Most Recent Min/Max in 24hrs   Temp 98.5 °F (36.9 °C) Temp  Min: 95 °F (35 °C)  Max: 98.5 °F (36.9 °C)   Pulse 79 Pulse  Min: 51  Max: 86   Resp 14 Resp  Min: 14  Max: 14   /72 BP  Min: 99/58  Max: 152/85   O2 Sat 98 % SpO2  Min: 90 %  Max: 100 %      Intake/Output Summary (Last 24 hours) at 10/22/2024 0741  Last data filed at 10/22/2024 0600  Gross per 24 hour   Intake 2213 ml   Output 1810 ml   Net 403 ml       Diet Enteral/Parenteral;  Tube Feeding No Oral Diet; Jevity 1.5; Continuous; 70; 200; Water; Every 4 hours    Invasive Monitoring           Physical Exam   Physical Exam  Eyes:      General: No foreign body in eyeNo scleral icterus.        Right eye: No discharge.         Left eye: No discharge.      Extraocular Movements: Extraocular movements intact.      Conjunctiva/sclera: Conjunctivae normal.   Skin:     General: Skin is warm and dry.      Findings: Wound present.   HENT:      Head: Normocephalic.   Neck:      Trachea: Tracheostomy present.   Cardiovascular:      Rate and Rhythm: Normal rate and regular rhythm.   Musculoskeletal:         General: Swelling present. No tenderness.      Right lower leg: Trace Edema present.      Left lower leg: Trace Edema present.   Abdominal:      Hernia: A hernia is present.   Constitutional:       General: He is not in acute distress.     Appearance: He is ill-appearing. He is not toxic-appearing.      Interventions: He is intubated and restrained (Right upper extremity for safety).   Pulmonary:      Effort: Pulmonary effort is normal. He is intubated.      Breath sounds: Wheezing present.   Neurological:      Mental Status: He is alert and oriented to person, place and time. He is calm.      Cranial Nerves: No facial asymmetry.          Diagnostic Studies        Lab Results: I have reviewed the following results:     Medications:  Scheduled PRN   apixaban, 5 mg, BID  chlorhexidine, 15 mL, Q12H ISAEL  gabapentin, 200 mg, Q8H  guaiFENesin, 400 mg, Q6H  levalbuterol, 1.25 mg, Q6H  magnesium sulfate, 2 g, Once  melatonin, 3 mg, HS  midodrine, 10 mg, Q8H  sodium chloride, 4 mL, Q6H      acetaminophen, 650 mg, Q6H PRN  bisacodyl, 10 mg, Daily PRN  LORazepam, 2 mg, Q4H PRN  oxyCODONE, 5 mg, Q6H PRN       Continuous          Labs:   CBC    Recent Labs     10/21/24  0406 10/22/24  0528   WBC 12.08* 9.81   HGB 10.5* 10.3*   HCT 33.8* 33.1*    209     BMP    Recent Labs     10/21/24  0406 10/22/24  0528    SODIUM 135 134*   K 4.0 4.1    100   CO2 31 34*   AGAP 4 0*   BUN 11 10   CREATININE 0.44* 0.43*   CALCIUM 8.4 8.5       Coags    No recent results     Additional Electrolytes  Recent Labs     10/21/24  0406 10/22/24  0528   MG 1.9 1.9   PHOS 2.7 3.4   CAIONIZED 1.12 1.13          Blood Gas    No recent results  No recent results LFTs  No recent results    Infectious  No recent results  Glucose  Recent Labs     10/21/24  0406 10/22/24  0528   GLUC 101 113

## 2024-10-22 NOTE — TELEPHONE ENCOUNTER
ESTABLISHED PT OF     STILL ADMITTED:10/5/2024 - present (17 days)  [facility]      1ST ATTEMPT,     VIA [facility]     Thank you,     Leonarda NAJERA/ ASHLEY GE/ Abnormal MRI     ----- Message from An MABER Wang sent at 10/18/2024  1:07 PM EDT -----  Regarding: HFMATTHEW Swanson will need follow-up in 4-6 weeks with neuromuscular team for Other in 60 minute appointment. They will require a repeat MRI within 4 weeks.

## 2024-10-23 ENCOUNTER — APPOINTMENT (INPATIENT)
Dept: MRI IMAGING | Facility: HOSPITAL | Age: 39
DRG: 720 | End: 2024-10-23
Payer: COMMERCIAL

## 2024-10-23 LAB
25(OH)D3 SERPL-MCNC: 17.4 NG/ML (ref 30–100)
ANION GAP SERPL CALCULATED.3IONS-SCNC: 6 MMOL/L (ref 4–13)
B BURGDOR IGG+IGM SER QL IA: NEGATIVE
BACTERIA BRONCH AEROBE CULT: ABNORMAL
BUN SERPL-MCNC: 12 MG/DL (ref 5–25)
CA-I BLD-SCNC: 1.16 MMOL/L (ref 1.12–1.32)
CALCIUM SERPL-MCNC: 8.9 MG/DL (ref 8.4–10.2)
CHLORIDE SERPL-SCNC: 97 MMOL/L (ref 96–108)
CO2 SERPL-SCNC: 34 MMOL/L (ref 21–32)
CREAT SERPL-MCNC: 0.45 MG/DL (ref 0.6–1.3)
ERYTHROCYTE [DISTWIDTH] IN BLOOD BY AUTOMATED COUNT: 18.7 % (ref 11.6–15.1)
FOLATE SERPL-MCNC: 6.2 NG/ML
GFR SERPL CREATININE-BSD FRML MDRD: 144 ML/MIN/1.73SQ M
GLUCOSE SERPL-MCNC: 101 MG/DL (ref 65–140)
GRAM STN SPEC: ABNORMAL
HCT VFR BLD AUTO: 36 % (ref 36.5–49.3)
HGB BLD-MCNC: 11.2 G/DL (ref 12–17)
MAGNESIUM SERPL-MCNC: 2 MG/DL (ref 1.9–2.7)
MCH RBC QN AUTO: 30.9 PG (ref 26.8–34.3)
MCHC RBC AUTO-ENTMCNC: 31.1 G/DL (ref 31.4–37.4)
MCV RBC AUTO: 99 FL (ref 82–98)
PHOSPHATE SERPL-MCNC: 3.1 MG/DL (ref 2.7–4.5)
PLATELET # BLD AUTO: 238 THOUSANDS/UL (ref 149–390)
PMV BLD AUTO: 10.4 FL (ref 8.9–12.7)
POTASSIUM SERPL-SCNC: 4.2 MMOL/L (ref 3.5–5.3)
RBC # BLD AUTO: 3.63 MILLION/UL (ref 3.88–5.62)
SODIUM SERPL-SCNC: 137 MMOL/L (ref 135–147)
TREPONEMA PALLIDUM IGG+IGM AB [PRESENCE] IN SERUM OR PLASMA BY IMMUNOASSAY: NORMAL
VIT B12 SERPL-MCNC: 630 PG/ML (ref 180–914)
WBC # BLD AUTO: 9.18 THOUSAND/UL (ref 4.31–10.16)

## 2024-10-23 PROCEDURE — 99233 SBSQ HOSP IP/OBS HIGH 50: CPT | Performed by: PSYCHIATRY & NEUROLOGY

## 2024-10-23 PROCEDURE — 94760 N-INVAS EAR/PLS OXIMETRY 1: CPT

## 2024-10-23 PROCEDURE — 82607 VITAMIN B-12: CPT

## 2024-10-23 PROCEDURE — 84425 ASSAY OF VITAMIN B-1: CPT

## 2024-10-23 PROCEDURE — 99233 SBSQ HOSP IP/OBS HIGH 50: CPT

## 2024-10-23 PROCEDURE — 83735 ASSAY OF MAGNESIUM: CPT

## 2024-10-23 PROCEDURE — 82306 VITAMIN D 25 HYDROXY: CPT | Performed by: STUDENT IN AN ORGANIZED HEALTH CARE EDUCATION/TRAINING PROGRAM

## 2024-10-23 PROCEDURE — 84100 ASSAY OF PHOSPHORUS: CPT

## 2024-10-23 PROCEDURE — A9585 GADOBUTROL INJECTION: HCPCS | Performed by: PHYSICIAN ASSISTANT

## 2024-10-23 PROCEDURE — 99232 SBSQ HOSP IP/OBS MODERATE 35: CPT | Performed by: STUDENT IN AN ORGANIZED HEALTH CARE EDUCATION/TRAINING PROGRAM

## 2024-10-23 PROCEDURE — 86051 AQUAPORIN-4 ANTB ELISA: CPT

## 2024-10-23 PROCEDURE — 94640 AIRWAY INHALATION TREATMENT: CPT

## 2024-10-23 PROCEDURE — 86235 NUCLEAR ANTIGEN ANTIBODY: CPT

## 2024-10-23 PROCEDURE — 94669 MECHANICAL CHEST WALL OSCILL: CPT

## 2024-10-23 PROCEDURE — 80048 BASIC METABOLIC PNL TOTAL CA: CPT

## 2024-10-23 PROCEDURE — NC001 PR NO CHARGE: Performed by: RADIOLOGY

## 2024-10-23 PROCEDURE — 86618 LYME DISEASE ANTIBODY: CPT

## 2024-10-23 PROCEDURE — 82330 ASSAY OF CALCIUM: CPT

## 2024-10-23 PROCEDURE — 94003 VENT MGMT INPAT SUBQ DAY: CPT

## 2024-10-23 PROCEDURE — 82525 ASSAY OF COPPER: CPT

## 2024-10-23 PROCEDURE — 85027 COMPLETE CBC AUTOMATED: CPT

## 2024-10-23 PROCEDURE — 94150 VITAL CAPACITY TEST: CPT

## 2024-10-23 PROCEDURE — 86780 TREPONEMA PALLIDUM: CPT

## 2024-10-23 PROCEDURE — 82746 ASSAY OF FOLIC ACID SERUM: CPT

## 2024-10-23 PROCEDURE — 72156 MRI NECK SPINE W/O & W/DYE: CPT

## 2024-10-23 RX ORDER — MIDODRINE HYDROCHLORIDE 5 MG/1
5 TABLET ORAL EVERY 8 HOURS
Status: DISCONTINUED | OUTPATIENT
Start: 2024-10-23 | End: 2024-10-24

## 2024-10-23 RX ORDER — GADOBUTROL 604.72 MG/ML
9 INJECTION INTRAVENOUS
Status: COMPLETED | OUTPATIENT
Start: 2024-10-23 | End: 2024-10-23

## 2024-10-23 RX ADMIN — Medication 3 MG: at 22:00

## 2024-10-23 RX ADMIN — LEVALBUTEROL HYDROCHLORIDE 1.25 MG: 1.25 SOLUTION RESPIRATORY (INHALATION) at 19:56

## 2024-10-23 RX ADMIN — Medication 4 ML: at 02:37

## 2024-10-23 RX ADMIN — Medication 4 ML: at 19:56

## 2024-10-23 RX ADMIN — LEVALBUTEROL HYDROCHLORIDE 1.25 MG: 1.25 SOLUTION RESPIRATORY (INHALATION) at 07:18

## 2024-10-23 RX ADMIN — GADOBUTROL 9 ML: 604.72 INJECTION INTRAVENOUS at 10:34

## 2024-10-23 RX ADMIN — MIDODRINE HYDROCHLORIDE 5 MG: 5 TABLET ORAL at 17:28

## 2024-10-23 RX ADMIN — LEVALBUTEROL HYDROCHLORIDE 1.25 MG: 1.25 SOLUTION RESPIRATORY (INHALATION) at 02:37

## 2024-10-23 RX ADMIN — CHLORHEXIDINE GLUCONATE 15 ML: 1.2 RINSE ORAL at 22:00

## 2024-10-23 RX ADMIN — MIDODRINE HYDROCHLORIDE 10 MG: 5 TABLET ORAL at 00:01

## 2024-10-23 RX ADMIN — GABAPENTIN 300 MG: 250 SOLUTION ORAL at 17:28

## 2024-10-23 RX ADMIN — APIXABAN 5 MG: 5 TABLET, FILM COATED ORAL at 08:01

## 2024-10-23 RX ADMIN — GABAPENTIN 300 MG: 250 SOLUTION ORAL at 07:58

## 2024-10-23 RX ADMIN — GUAIFENESIN 400 MG: 200 SOLUTION ORAL at 00:01

## 2024-10-23 RX ADMIN — Medication 4 ML: at 13:28

## 2024-10-23 RX ADMIN — GUAIFENESIN 400 MG: 200 SOLUTION ORAL at 04:54

## 2024-10-23 RX ADMIN — LEVALBUTEROL HYDROCHLORIDE 1.25 MG: 1.25 SOLUTION RESPIRATORY (INHALATION) at 13:28

## 2024-10-23 RX ADMIN — LORAZEPAM 2 MG: 2 INJECTION INTRAMUSCULAR; INTRAVENOUS at 21:22

## 2024-10-23 RX ADMIN — MIDODRINE HYDROCHLORIDE 10 MG: 5 TABLET ORAL at 08:01

## 2024-10-23 RX ADMIN — Medication 4 ML: at 07:19

## 2024-10-23 RX ADMIN — GUAIFENESIN 400 MG: 200 SOLUTION ORAL at 17:28

## 2024-10-23 RX ADMIN — GABAPENTIN 300 MG: 250 SOLUTION ORAL at 00:01

## 2024-10-23 RX ADMIN — CHLORHEXIDINE GLUCONATE 15 ML: 1.2 RINSE ORAL at 08:01

## 2024-10-23 RX ADMIN — GUAIFENESIN 400 MG: 200 SOLUTION ORAL at 12:29

## 2024-10-23 RX ADMIN — PROPOFOL 5 MCG/KG/MIN: 10 INJECTION, EMULSION INTRAVENOUS at 09:49

## 2024-10-23 NOTE — PROGRESS NOTES
Physical Medicine and Rehabilitation Progress Note  Hemanth Swanson 39 y.o. male MRN: [id number]  Unit/Bed#: ICU 05 Encounter: [id number]      Chief Complaints:  Resp failure     Subjective/eval:     MRI C spine showing Long segment of abnormal signal in the cervical and upper thoracic spine favored to represent nonspecific transverse myelitis.  No cord expansion, atrophy or abnormal enhancement. Recommend clinical correlation and further evaluation with CSF. Recommend 3-month follow-up contrast-enhanced MRI.  - Notified neuro of above who will follow-up      Did not require PRN IV ativan last day   Midodrine reduced to 5mg TID    Repeat bronch 10/22 (fentanyl/propofol sed); otherwise no PRN Ativan since yesterday AM; Bronch with significant thick secretion occluding both distal mainstems. Significant debris irrigated b/l.     On eval, patient more wakeful today; able to open eyes to voice, pupils reactive, he is able to track with eyes, he was able to blink to command and show 2-3 fingers correctly in L hand.  He continues to be able to squeeze both hands to command and slightly move legs, feet, and toes; Not adequately able to communicate yes-no with thumbs up or nodding at this time.    ROS: Could not be adequately (or fully adequately be) obtained due to degree of impaired cognition/communication at time of encounter.    Assessment & Plan:     39-year-old male with a past medical history of metastatic left testicular seminoma, status post left orchiectomy, chemotherapy, hypertension, anxiety, bipolar disorder, who developed hearing loss possible left-sided vestibular schwannoma, neuromuscular weakness, diplopia, bowel bladder incontinence following chemotherapy which was stopped.  Patient had extended hospital stay in August to September 2023 for this which also included respiratory failure requiring trach and PEG pulmonary embolism for which patient continues chronic trach/PEG status.  Patient also had  "hospitalization 5/2024 for multilobar PNA, hypoxic bradycardia felt 2/2 vasal vagal stimulation increased secretions and position of trach with mucous plugging c/b cardiac arrest with pulseless asystole without hypoxia, seizure like actiity felt to not be seizure activity helped apparently with managing secretions and keeping O2 above 90%.  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  There was no no electrodiagnostic evidence to support a diffuse neurogenic process such as motor neuron disease or a myopathic process affecting the peripheral nervous system.  Last OP neuro note 7/2024, \"given the lack of an obvious cause of his respiratory failure it is reasonable to consider neuromuscular disease. However, there is no evidence that this is the case. Specifically, there is nothing to suggest primary muscle disease, primary nerve disease, or neuromuscular junction disorders.\"      Of note,  patient was eating and drinking orally and they would not provide PEG tube feeds if eating or drinking adequately.  Patient would not drink plain water and drank mostly flavored drinks and sometimes soda per family.      Per family he had slow decline in function but overall was stable last few months at home with them managing him with trach/peg status.  He did have some hot flashes and sweating at home and was seen by urology late Aug and was started on IM testosterone biweekly for over a month.  He was noted to have increased agitation/restlessness for a week or so (per family he did not have these prior) and then significant more agitation and anger and was recommended to hold testosterone with last dose 9/30.   "       On 10/5, patient apparently pulled out PEG during bout of agitation and presented to hospital where G tube was replaced but he was noted to be hypoxic with copious secretions and febrile.  He required adjustments to ventilation.  He was found to have complete L sided consolidation along with severe hypernatremia 176.  Patient was treated for PNA and sepsis with Abx.  Patient bronch culture + Proteus and pseudomonas and remains on Zosyn.  Leukocytosis improved 10/14.  Patient received IVF and nephro consulted for hypernatremia which was felt to be 2/2 dehydration which has been improving.  He had episode of rhythmic body jerking for several minutes on 10/10 and additional shaking episodes with hypoxia and bradycardia with hypotension requiring pressors.  He was placed on vEEG which captures some of these shaking spells which did not correlate with EEG seizures.  Neuro felt spells may be due to vagal response and possibly convulsive syncope in setting of hypoxia/bradycardia with possible functional component.  They did not feel he needed AED.       Significant episode of L lung mucous plugging requiring bronch suctioning 10/15; TTE 10/15 normal EF, diastolic function, R ventricle moderately dilated and new compared with 6/2024 study.   Florinef d/c'd; Remains on midodrine 15mg TID; remains on Oxy 5mg Q6H ATC; seroquel 25mg HS.  Required multiple doses of benzo on 10/16 for episodes of agitation/distress and need for sedation with planned MRI brain.     10/16, Discussion with CCM attending and concern for ongoing refractory respiratory failure and likely neuromuscular weakness significantly impacting resp muscles/diaphragm; given this degree of weakness, lack of available effective treatments, he is at risk for ongoing difficulty clearing secretions, mucous plugging, PNA, and respiratory collapse even with optimal ventilation.       10/16 MRI brain showed Mildly restricted diffusion in the splenium of the corpus  "callosum and more subtly in the anterior cingulate gyri. Possible sequela of hypoxic ischemic injury, inflammatory or infectious process. Per neuro \" with abnormal lesion in the splenium of corpus callosum (without post contrast enhancement), thus neurology asked to weigh in on MRI findings (CLOCC; cytotoxic lesion of corpus collosum, can carry broad spectrum of possible etiologies).\"Unclear etiology at this time. Would recommend repeat MRI in 1 month to re-evaluate findings.\"    10/21 CXR Persistent left basilar opacity may represent atelectasis and or pneumonia. Prominent pulmonary vasculature may be accentuated by low lung volumes versus mild volume overload. Required bronch     10/22 desat'ing multiple times requiring bagging; apparently helped after IV ativan; Repeat bronch 10/22 with significant thick secretion occluding both distal mainstems. Significant debris irrigated b/l.     10/21 MRI C spine showing Long segment of abnormal signal in the cervical and upper thoracic spine favored to represent nonspecific transverse myelitis.  No cord expansion, atrophy or abnormal enhancement. Recommend clinical correlation and further evaluation with CSF. Recommend 3-month follow-up contrast-enhanced MRI.  - Notified neuro of above who will follow-up      Prior Level of Function and Social history:    Patient lives at home with family - sister Dmitry and Aunt Magaly who provide caregiving.  He was able to move arms and legs but was weak throughout.  He was able to type on computer and phone and follow simple commands and voice words.  Patient was eating and drinking orally and they would not provide PEG tube feeds if eating or drinking adequately.  Patient would not drink plain water and drank mostly flavored drinks and sometimes soda.  He could help some with dressing.  He is chronically incontinent of b/b but would notifiy CG's/family when he went.  Overall though was dependent with ADLs and mobility; WVC, Wallace lift, " Electric recliner; In past he could get to commode but not last several months.       Current Level of Function:    Dependent     Acute on Chronic respiratory failure with hypoxia  Neuromuscular weakness, abnormal MRI C spine   Sepsis  AMS/encephalopathy  Diaphoresis   Hypotension  Bradycardia  Elevated Testosterone; hx of hypogonadism   Metastatic left testicular seminoma, status post left orchiectomy, chemotherapy  Trach dependent  PEG status  Hx of PE  Pneumonia  Hypernatremia  Seizure like episodes  Bowel and bladder dysfunction   Agitation, restlessness, insomnia, hx of anxiety and bipolar  Encounter for skin care     Dispo rec:  TBD - family would like patient to be stabilized and come home but currently not stable enough for safe discharge home and is at increased risk of mortality; close follow-up with palliative/CCM and family appropriate      Acute on Chronic respiratory failure with hypoxia on chronic vent, Sepsis, PNA  - 10/23 MRI C spine with and without contrast for ongoing respiratory failure, diaphragmatic weakness, and 4 limb weakness (reflexes chronically down but that could be from concurrent peripheral neuropathy)   - showing Long segment of abnormal signal in the cervical and upper thoracic spine favored to represent nonspecific transverse myelitis.  No cord expansion, atrophy or abnormal enhancement. Recommend clinical correlation and further evaluation with CSF. Recommend 3-month follow-up contrast-enhanced MRI.  - Notified neuro of above who will follow-up   - Consider EMG/NCS to include phrenic nerve    - 10/22 desat'ing multiple times requiring bagging; apparently helped after IV ativan; Repeat bronch 10/22 with significant thick secretion occluding both distal mainstems. Significant debris irrigated b/l.   - Continue gabapentin 300mg TID with goal to wean of benzo's at some point  - 10/21 - bedside bronch during which patient desaturated; gram stain 2+GPR, 1+GNR  - 10/21 - pt afeb,  "increased WBC  10/21 10/21 CXR apparently worsening and plan for repeat bronch today?.  Did require brief bagging as well.  - 10/18 Zosyn/Abx course completed   - 10/17 trach exchanged for issues with balloon  - Started on valium and gabapentin for repeated episode of inability to tolerate vent   - 10/15 Significant episode of L lung mucous plugging requiring bronch suctioning 10/15;   - Concern for ongoing refractory respiratory failure, now with and likely neuromuscular weakness significantly impacting resp muscles/diaphragm; given this degree of weakness, lack of available effective treatments, he is at risk for ongoing difficulty clearing secretions, mucous plugging, PNA, and respiratory collapse even with optimal ventilation  - TTE 10/15 normal EF, diastolic function, R ventricle moderately dilated and new compared with 6/2024 study  - 10/5 P/w complete opacification of L hemithorax, hypoxic with copious secretions, fever following increasing bouts of agitation and pulling out peg in context of recent initiation of IM testosterone (unclear relation)  - he had last available swallow  studies in 10 and 11/2023 and were unremarkable and family has been providing oral feeds/oral fluids mostly at home   - 5/2024 hospitalization for multilobar PNA, hypoxic bradycardia felt 2/2 vasal vagal stimulation increased secretions and position of trach with mucous plugging c/b cardiac arrest with pulseless asystole without hypoxia, seizure like actiity felt to not be seizure activity helped apparently with managing secretions and keeping O2 above 90%  - Ensure appropriate mgmt of secretions and trach positioning   - Currently NPO status/TF only - Ensure SLP eval and MBS study prior to any future considerations for oral intake   - 10/14 Having intermittent bouts of increased PIP - sometime corresponding to diaphoresis and possibly \"fighting vent\"/anxiety, agitation per discussion with staff; had elevated PIP/low VT improved " with suction of thick yellow mucous 10/14; did receive IV ativan with brief improvement earlier in day; oxy 5 x1 with little improvement   - Repeat CXR 10/14 pending; prior CXRs showing L basilar effusion and atelectasis    - CT C 10/11 - Moderate bibasilar pulmonary opacification consistent with atelectasis; superimposed pneumonia not entirely excludable. Trace left greater than right pleural effusions.   - Last CTPE study 5/30 negative for PE   - Overall sedation per [facility]              - Oxy 5mg Q6H PRN    - Ativan 2mg IV PRN > received 10/20 - 2 doses, 10/21 - doses, 10/22 1 dose (but received fentanyl for bronch)    - Diazepam standing stopped              - Gabapentin 300mg TID -  trying to wean PRN benzo's (otherwise consider longer acting benzo)   - Treated recently for PNA, possible atelectasis -  - Was on multiple agents for BP support -    - Now only midodrine  - Chronic resp failure etiology uncertain - neuro work-up inconclusive   - Overall mgmt and sedation per [facility]    - Monitor vent values, other vitals, labs, lung exam, overall exam, secretions closely  - Suction PRN when indicated  - Monitor for ventilator associated complications     Neuromuscular weakness, AMS/encephalopathy, abnormal MRI C spine  - MRI C spine - long segment myelitis, MRI brain with restricted diffusion in splenium of CC and significant polyneuropathy hx of refractory resp depression in past both axonal and demyelinating PN on EMG/NCS 9/2023  - Overall mgmt per neuro/CCM  - Diff - Idiopathic, Paraneoplastic process with prior PNS and now CNS involvement? even though prior work-up negative; other inflammatory process, sarcoid, NMO, hypoxic insult, other?  - MRI C spine - showing Long segment of abnormal signal in the cervical and upper thoracic spine favored to represent nonspecific transverse myelitis.  No cord expansion, atrophy or abnormal enhancement. Recommend clinical correlation and further evaluation with CSF. Recommend  "3-month follow-up contrast-enhanced MRI.  - Notified neuro of above who will follow-up - additional CSF/serology per neuro  - Consider EMG/NCS to include phrenic nerve  - Mentation/wakefulness improving some   - Strength recently  BUE EF/EE 2-, FF 2+ to 4; BLE 1 to 2-   - OP Neuro exam 7/17 - Strength at least 4+/5 in all muscles of arms and legs proximally and distally (Absent DTR)  -  Patient did have restricted diffusion in splenium of CC on recent contrasted brain MRI  - pt does not have evidence of UMN signs of exam however last IP neuro note felt it was reasonable to obtain as well and if patient has/had concurrent peripheral neuropathy this may dampen expected heightened reflexes and he continues with significant 4 limb weakness with diaphragmatic weakness   - High risk of unexplained neuromuscular d/o driving resp failure at this point - without significant effective treatments   - Zinc wnl   - Selenium pending to eval for myoencephalopathy  - F/u paraneoplastic panel pending  - risk of encephalopathy or other cause as well as progression of underlying condition of uncertain etiology  - Consider repeat overall EMG/NCS to look for active demyelination/axonopathy and to include phrenic nerve testing (if phrenic nerve not involved could discuss case with [facility] to see if he would be candidate for diaphragmatic pacing) - 10/16 MRI brain showed Mildly restricted diffusion in the splenium of the corpus callosum and more subtly in the anterior cingulate gyri. Possible sequela of hypoxic ischemic injury, inflammatory or infectious process. Per neuro \" with abnormal lesion in the splenium of corpus callosum (without post contrast enhancement), thus neurology asked to weigh in on MRI findings (CLOCC; cytotoxic lesion of corpus collosum, can carry broad spectrum of possible etiologies).\"\"Unclear etiology at this time. Would recommend repeat MRI in 1 month to re-evaluate findings.\"  - Per neuro 10/19 " "\"neurohospitalist Dr. Geiger, d/w'ed Dr. Sams - reasonable to consider MRI c-spine w/wo contrast, given c/f bilateral diaphragm paralysis and evaluate for any structural abnormalities that could be impacting phrenic nerves. If obtained, please reach out to us if further input needed. \"  - Requires 24-7 caregivers and dependent with ADLs and mobility   - flaccid tone overall, generalized weakness, could track and move all extremities some to command but not consistently   - Monitor neuro exam and cosider  - While patient at current functional level would also focus on prevention or improvement of complications (malnutrition, dehydration, PNA, atelectasis, VTE, constipation/obstruction, urinary retention, UTI, ulcerations, contractures).  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  There was no no electrodiagnostic evidence to support a diffuse neurogenic process such as motor neuron disease or a myopathic process affecting the peripheral nervous system.  Last OP neuro note 7/2024, \"given the lack of an obvious cause of his respiratory failure it is reasonable to consider neuromuscular disease. However, there is no evidence that this is the case. Specifically, there is nothing to suggest primary muscle disease, primary nerve disease, or neuromuscular junction disorders.\"    " "  Hypotension, bradycardia   - BP improved from earlier  - Prior hospitalization 5/2024 for multilobar PNA \"noted hypoxic bradycardia felt 2/2 vasal vagal stimulation increased secretions and position of trach with mucous plugging\"  - Ensure appropriate mgmt of secretions and trach positioning   - Only on midodrine 5mg TID now - was not on at home  (Was on multiple agents for BP support recently - (levophed stopped first, than hydrocortisone and most recently florinef; ensure adequate hydration (BUN/Cr appropriate, sodium improved)- Flaccid tone mostly, did have some dilated reactive pupils during episode of diaphoresis and resp distress on vent - presentation and hx not c/w traditional autonomic storming  - Ensure mgmt of infection or other causes    - TTE 10/15 normal EF, diastolic function, R ventricle moderately dilated and new compared with 6/2024 study  - Completed Zosyn course 10/18  - Fluids per CCM - 200 cc free water Q4H ; TFs     Elevated Testosterone (Endocrinopathy); hx of hypogonadism   - Patient received T Cypionate 100mg IM biweekly for over a month with last dose 9/30 for hypogonadism earlier this month (T level prior 19) - this is long lasting T formulation and now 3 weeks since last IM dose  - 10/11 FT >50, Total T >1500 (Max) - still fairly high level for 11 days later (half life 8-12 d but can stay in body for several weeks gradually decreasing  - Gynecomastia  (can be conversion of T to E, steroids) - can lead to MI, CVA, heart failure, polycythemia, mood swings, agitation, anxiety, depression, tendon injury, sleep apnea, DM, Wt gain, cog decline  - Query if high T level cause bouts of agitation/aggression recently at home which is possible based on hx from family   - Mildly elevated prolactin   - FSH, LH both low; AFP, hCG negative  - He did have some hot flashes and sweating at home and was seen by urology late Aug and was started on IM testosterone biweekly for over a month.  He was noted to " "have increased agitation/restlessness for a week or so (per family he did not have these prior) and then significant more agitation and anger and was recommended to hold testosterone with last dose 9/30.      - Consider Endocrinology consult     Metastatic left testicular seminoma, status post left orchiectomy, chemotherapy  - Work-up and mgmt per CCM/H-O  - AFP, hCG negative  - CT C/A/P without suspicious findings      PEG status  - Monitor site, bowel function  - TF's per primary      Hypernatremia  - Remains improved felt to be lack of free water/dehydration per nephro; treated sepsis/PNA on admission  - Per family, patient was eating and drinking orally and they would not provide PEG tube feeds if eating or drinking adequately.  Patient would not drink plain water and drank mostly flavored drinks and sometimes soda per family.   - Fluid boluses and Tfs per CCM/nutrition   - Ensure caregiver education on appropriate intake/flushes and monitoring after d/c   - Monitor  - On 200 ml fluid boluses Q4H which is more than last nutrition note 10/7 > which would give  Close to 2500 ml water/d if getting full Tfs and flushes with meds;   - \"continuous tube feeds of Jevity 1.5 @ 70 ml/hr. Water flushes of 150 ml q 4 hrs. Start at 20 ml/hr, advance by 10 ml q 4 hrs as tolerated until goal rate is reached. Defer additional water flush adjustments to critical care d/t hypernatremia.At goal EN regimen provides 2520 kcals, 107 g protein, and 2177 ml total water from formula + flushes;      Seizure like episodes  - Neuro felt spells may be due to vagal response and possibly convulsive syncope in setting of hypoxia/bradycardia with possible functional component.  They did not feel he needed AED.    - episode of rhythmic body jerking for several minutes on 10/10 and additional shaking episodes with hypoxia and bradycardia with hypotension requiring pressors.  He was placed on vEEG which captures some of these shaking spells which " "did not correlate with EEG seizure  -5/2024 hospitalization - also had seizure like actiity felt to not be seizure activity helped apparently with \"managing secretions and keeping O2 above 90%\"  - Consider increasing gabapentin to 300mg TID - limited other options  - Still getting PRN Ativan for desat episodes      Hx of PE  - On Eliquis; Last CTPE 5/2024 negative      Bowel and bladder dysfunction  - has been chronically incontinent since after chemo per family; he usually is able to tell them when he is incontinent and they have been able to maintain hygiene   - Sheppard in place currently  - Incontinent stool 10/14  - Ensure adequate hygiene  - Monitor for diarrhea/constipation      Agitation, restlessness, insomnia, hx of anxiety and bipolar  (Recently administered T IM with worsening symptoms at home)  - Consider increasing gabapentin to 300mg TID with hope to back down other agents in future   - Still getting PRN ativan      Encounter for skin care; R hand skin traumatic wound  - Wound care 10/17  \"traumatic wound from patient hitting hand against bedside rail, now full thickness with yellow slough tissue, periwound with edema and erythema, scant serosanguineous drainage. Cleanse right hand with normal saline, apply normalgel to wound bed, cover with silicone bordered foam. Change daily and PRN soilage/displacement. \"  - Increased risk of skin wounds/breakdown/rashes due to recent immobility and co-morbidities   - Turn patient Q2H in bed (use pillows or wedges)  - Patient and if available caregiver education   - Hydragaurd to buttocks and sacrum BID & PRN  - Allevyn foam to heels and float heels when in bed   - Optimal bowel/bladder hygiene; keep skin clean and dry   - EHOB waffle cushion to chair/WC when OOB  - Nursing to document in chart and Notify MD if buttock, sacrum, heel, or other skin site develops erythema, skin breakdown, or rashes as soon as possible.  If patient is soiling themselves with urine or " stool notify MD.  If you are unable to maintain skin integrity and prevent erythema due to frequency of soiling notify MD as soon as possible as well.      VTE prophylaxis   As outlined by primary team      Other medical issues:     Per primary service (and relevant consultants if applicable)    Objective:    Allergies per EMR    Physical Exam:  Temp:  [97.2 °F (36.2 °C)-98.1 °F (36.7 °C)] 97.2 °F (36.2 °C)  HR:  [67-95] 86  Resp:  [2-18] 20  BP: ()/(56-93) 115/62  SpO2:  [92 %-100 %] 96 %    General: Lying in bed - trached/vent   HENT: tongue somewhat protuding at times  Respiratory: Vent  Cardiovascular: Regular rate   Gastrointestinal: Soft, non-distended, normoactive, nontender  SkiN/MSK/Extremities:  No calf edema, no calf tenderness to palpation  Neurologic/Psych:   more wakeful today; able to open eyes to voice, pupils reactive, he is able to track with eyes, he was able to blink to command and show 2-3 fingers correctly in L hand.  He continues to be able to squeeze both hands to command and slightly move legs, feet, and toes;  10/23 - BUE prox trace to 2- FF 2+ to 4, BLE 1 to 2- (reflexes down still)   10/22 less ability to test as more tired - able to squeeze both hands 3-4, move toes, ankles, prox LE 1-2/5   10/21 BUE EF/EE 2-, FF 2+ to 4  BLE 1 to 2-      Diagnostic Studies: Reviewed  XR chest portable   Final Result by Quentin Brownlee DO (10/21 1622)      Limited study.      Persistent left basilar opacity may represent atelectasis and or pneumonia.      Prominent pulmonary vasculature may be accentuated by low lung volumes versus mild volume overload.      Workstation performed: [id number]         XR chest portable ICU   Final Result by Joan Mcintyre MD (10/17 2027)      Persistent extensive opacity in the left lower lobe with loss of the diaphragm which could be due to atelectasis and/or pneumonia in the appropriate clinical setting.            Workstation performed: [id number]          XR chest portable   Final Result by Tobias Leon MD (10/17 1328)      Study limited by low lung volumes. Persistent dense  left  lung base opacity which may represent a combination of effusion and parenchymal opacity.            Workstation performed: [id number]         MRI brain w wo contrast   Final Result by Andi Mccloud MD (10/17 0112)      Mildly restricted diffusion in the splenium of the corpus callosum and more subtly in the anterior cingulate gyri. Possible sequela of hypoxic ischemic injury, inflammatory or infectious process.      The study was marked in EPIC for immediate notification..      Workstation performed: [id number]         XR chest portable ICU   Final Result by Jewel Burton MD (10/15 0521)      Opacification at both lung bases is unchanged likely due to atelectasis. Infiltrates and pleural effusions not excluded.            Workstation performed: [id number]         CT chest abdomen pelvis w contrast   Final Result by Philippe Coates MD (10/11 4596)         1. Moderate bibasilar pulmonary opacification consistent with atelectasis; superimposed pneumonia not entirely excludable. Trace left greater than right pleural effusions.   2. No acute abnormality identified in the abdomen or pelvis.   3. Additional findings as noted.               Workstation performed: [id number]         XR chest portable ICU   Final Result by Brendon Snyder MD (10/11 0356)      Left basilar effusion and atelectasis redemonstrated. Milder right basilar effusion/subsegmental atelectasis, also similar.         Workstation performed: [id number]         CT head wo contrast   Final Result by Andi Mccloud MD (10/09 2001)      No acute intracranial abnormality.                  Workstation performed: [id number]         XR chest portable ICU   Final Result by Andrea Bai MD (10/09 0823)      Left basilar effusion and atelectasis redemonstrated. Milder right basilar  effusion/subsegmental atelectasis, also similar.            Workstation performed: [id number]         XR chest portable ICU   Final Result by Carlos tAkinson MD (10/07 1303)      Near complete opacification of the left hemithorax with leftward mediastinal shift, mildly improved from 10/5/2024, suggesting baseline underlying atelectasis and pleural effusion.      Note, left lung pneumonia cannot be excluded.      No pneumothorax.            Resident: SONAM KNOX I, the attending radiologist, have reviewed the images and agree with the final report above.      Workstation performed: [id number]         MRI brain w wo contrast    (Results Pending)   MRI cervical spine w wo contrast    (Results Pending)       Laboratory: Reviewed  Results from last 7 days   Lab Units 10/23/24  0636 10/22/24  0528 10/21/24  0406   HEMOGLOBIN g/dL 11.2* 10.3* 10.5*   HEMATOCRIT % 36.0* 33.1* 33.8*   WBC Thousand/uL 9.18 9.81 12.08*     Results from last 7 days   Lab Units 10/23/24  0636 10/22/24  0528 10/21/24  0406   BUN mg/dL 12 10 11   SODIUM mmol/L 137 134* 135   POTASSIUM mmol/L 4.2 4.1 4.0   CHLORIDE mmol/L 97 100 100   CREATININE mg/dL 0.45* 0.43* 0.44*            Drug regimen reviewed, all potential adverse effects identified and addressed:    Scheduled Meds:  Current Facility-Administered Medications   Medication Dose Route Frequency Provider Last Rate    acetaminophen  650 mg Per PEG Tube Q6H PRN Ne Low MD      apixaban  5 mg Per PEG Tube BID Mohsen Moon MD      bisacodyl  10 mg Rectal Daily PRN Ne Low MD      chlorhexidine  15 mL Mouth/Throat Q12H [facility] Mohsen Moon MD      fentaNYL  50 mcg Intravenous Q15 Min PRN Jus Melendez MD      gabapentin  300 mg Per PEG Tube Q8H Jus Melendez MD      guaiFENesin  400 mg Per G Tube Q6H Tay Eng PA-C      levalbuterol  1.25 mg Nebulization Q6H Silvio Alas MD      LORazepam  2 mg Intravenous Q4H PRN  Grazyna Goldstein PA-C      melatonin  3 mg Per PEG Tube HS AMBER Cline      midodrine  5 mg Per PEG Tube Q8H Yessy Barksdale MD      oxyCODONE  5 mg Per PEG Tube Q6H PRN Jus Melendez MD      propofol  5-50 mcg/kg/min Intravenous Titrated Jus Melendez MD 10 mcg/kg/min (10/23/24 0958)    sodium chloride  4 mL Nebulization Q6H John Sutherland MD         ** Please Note: Fluency Direct voice to text software may have been used in the creation of this document. **      Thank you for allowing the PM&R service to participate in the care of this patient. We will continue to follow. Please do not hesitate to call with questions or concerns.     Kilo Jennings MD, MS  [facility]  Physical Medicine and Rehabilitation  Brain Injury Medicine

## 2024-10-23 NOTE — ASSESSMENT & PLAN NOTE
Intermittent bradycardia not related to hypoxia.   He has been maintaining heart rates above 60 since medication adjustments.  Seroquel discontinue  Heart rate between episodes fluctuates from low 80s high 90s.  Continues to experience desaturations/bradycardic episodes likely vasovagal in nature  Removed scheduled benzodiazepines and opioids.  Episodes continue but have decreased in intensity     Plan  -Currently treating with Ativan as needed and manual bagging/suction   -Increased gabapentin to 300 mg 3 times daily

## 2024-10-23 NOTE — TELEMEDICINE
e-Consult (IPC)  - Interventional Radiology  Hemanth Swanson 39 y.o. male MRN: [id number]  Unit/Bed#: ICU 05 Encounter: [id number]          Interventional Radiology has been consulted to evaluate Hemanth Swanson    Inpatient Consult to IR  Consult performed by: Melisa Flores MD  Consult ordered by: Jus Melendez MD        10/23/24    Assessment/Recommendation:   Neurology requested IR's assistance for an LP.    Order placed.    Patient's Eliquis has been placed on hold; last dose was taken on 10/23/2024 at 8 AM.  He needs a 2-day hold (or 4 doses).    5-10 minutes, >50% of the total time devoted to medical consultative verbal/EMR discussion between providers. Written report will be generated in the EMR.     Thank you for allowing Interventional Radiology to participate in the care of Hemanth Swanson. Please don't hesitate to call or TigerText us with any questions.     Melisa Flores MD

## 2024-10-23 NOTE — PROGRESS NOTES
"Progress Note - Neurology   Name: Hemanth Swanson 39 y.o. male I MRN: [id number]  Unit/Bed#: ICU 05 I Date of Admission: 10/5/2024   Date of Service: 10/23/2024 I Hospital Day: 18     Assessment & Plan  Abnormal MRI  Hemanth Swanson is a 39 y.o. 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 Patient initially presented on 10/5/2024 to [facility] after pulling out G-tube. He was noted to be tachycardic, hypoxic, and febrile with leukocytosis. PNA concern on chest imaging. He was subsequently transferred to [facility] ICU for further monitoring and management. Bronchoscopy cultures positive for Proteus and Pseudomonas.     Neurology initially consulted on 10/09 for seizure like activity in the setting of hypoxia/bradycardia/hypotension. Episodes were captured on video EEG and without correlates, thus felt to be \"vagal response and possibly convulsive syncope in the setting of hypoxia/bradycardia.  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 Unclear etiology at this time. Recommended repeat MRI in 1 month to re-evaluate findings.    Neurology asked to reevaluate the patient again on 10/23/2024 for abnormal MRI C-spine (10/23) findings which demonstrated long segment of abnormal signal in the cervical and upper thoracic spine concerning for transverse myelitis.    Workup:  - 10/9 CT head: \"No " "acute intracranial abnormality\"  - 10/10 vEEG:   \"This 18 hour continuous video-EEG recording is abnormal. Multiple events of rocking up and down in bed (as described above) and multiple events of left arm shaking, jaw tremor, at times with upward eye deviation, as well as one event of body stiffening were captured, all without EEG changes suggesting these events were not seizures. Intermittent left temporal delta activities suggest an underlying area of neuronal dysfunction. No electrographic seizures or interictal epileptiform discharges were seen.\"  - 10/16 MRI brain w wo:   \"Mildly restricted diffusion in the splenium of the corpus callosum and more subtly in the anterior cingulate gyri. Possible sequela of hypoxic ischemic injury, inflammatory or infectious process.\"  - 10/23 MRI C-spine w wo:  \"Long segment of abnormal signal in the cervical and upper thoracic spine favored to represent nonspecific transverse myelitis.  No cord expansion, atrophy or abnormal enhancement.  Recommend clinical correlation and further evaluation with CSF. Recommend 3-month follow-up contrast-enhanced MRI.\"    Plan:  - Recommend repeat LP, likely with IR if possible   Plan to hold Eliquis   CSF labs to be obtained: RBC, WBC, total protein, glucose, gram stain, culture, MS panel, Anti MOG Antibody CSF (miscellaneous study), paraneoplastic panel   - Hold off on initiating IV steroids at this time, will obtain LP first   - Pending: Paraneoplastic profile serum study  - Recommend the following serum studies:   NMO, Anti MOG antibody serum (miscellaneous study), copper, Sjogren's, vitamin B12, vitamin B1, folate, Lyme, RPR  - On Eliquis for thromboembolism/PE; plan to hold for LP  - Monitor neuro exam; notify with any changes  - Medical management and supportive care per ICU team. Correction of any metabolic or infectious disturbances.   Seizure-like activity (HCC)  - See above, status post video EEG this admission   - Captured events " without EEG correlate; felt to be related to vagal response/convulsive syncope in the setting hypoxia/bradycardia/hypotension  - No need for AED initiation  Sepsis (HCC)  - Noted to be febrile, tachycardic, with elevated WBC/lactic acid on presentation  - Initial CXR: Complete opacification of the left hemithorax with mediastinal shift to the left indicating at least in part atelectasis.   - COVID/flu/RSV negative  - Blood cultures, UA negative  - Sputum culture positive for Proteus mirabilis, Pseudomonas aeruginosa  - Antibiotic management per primary team  - Medical management per ICU  Chronic respiratory failure with hypoxia and hypercapnia (HCC)  - Tracheostomy with vent dependence  - Episodes of hypoxia; patient tampering with trach balloon  - Underwent trach exchange via ENT  - Medical management per ICU  Bradycardia  - Noted with HR dropping as low as 30-40s  - Received atropine on 10/8 for episode of arrest/pause and on 10/9 for bradycardia  - Cardiology following; appreciate recommendations  - No recommendation for PPM at this time  - Suspected to be in the setting of vasovagal/agitation  - Telemetry  - Medical management per ICU and Cardiology    Neurology Follow Up:  Hemanth Swanson will need follow-up in in 4 weeks with neuromuscular team for Other in 60 minute appointment. They will not require outpatient neurological testing.     Subjective   Patient is nonverbal throughout encounter. Unable to report if he is having any discomfort or pain     Review of Systems  12 point ROS limited to nonverbal state     Objective :  Temp:  [96.4 °F (35.8 °C)-98.1 °F (36.7 °C)] 97.5 °F (36.4 °C)  HR:  [64-95] 64  BP: (115-151)/(61-93) 136/87  Resp:  [18] 18  SpO2:  [92 %-100 %] 98 %    Physical Exam  Vitals and nursing note reviewed.   Constitutional:       Appearance: He is normal weight. He is diaphoretic. He is not ill-appearing or toxic-appearing.   HENT:      Head: Normocephalic and atraumatic.   Eyes:       General: No scleral icterus.        Right eye: No discharge.         Left eye: No discharge.      Extraocular Movements: Extraocular movements intact.      Conjunctiva/sclera: Conjunctivae normal.      Pupils: Pupils are equal, round, and reactive to light.   Pulmonary:      Comments: S/p trach  Skin:     General: Skin is warm.      Coloration: Skin is not jaundiced or pale.   Neurological:      Mental Status: He is alert.       Neurological Exam  Mental Status  Alert.  Patient is alert, lying in bed, s/p trach, not on sedation   Able to look towards examiners voice, however does not maintain eye contact   Able to nod head yes and no throughout exam to answer questions   Incorrectly nods his head yes when asked if his name is Trevor  Incorrectly nods his head yes when asked if he's at home   Able to follow central and appendicular commands   Nonverbal throughout encounter .    Cranial Nerves  CN III, IV, VI: Extraocular movements intact bilaterally. Pupils equal round and reactive to light bilaterally.  Pupils equal, round, reactive bilaterally   EOMs intact, no evidence of nystagmus   Blink to threat intact bilaterally   No obvious facial asymmetry, limited eval due to beard   Tongue midline, no lacerations noted .    Motor    Patient in BUE restraints     Bilateral  5/5  Left biceps and triceps 5/5  Right biceps 2/5, Right triceps 5/5  Bilateral deltoid 1/5    Bilateral hip flexion 4+/5  Bilateral knee flexion 5/5  Difficulty assessing bilateral knee extension   Limited ROM in ankles bilaterally   Bilateral dorsiflexion and plantar flexion 4/5.    Sensory  Reports sensation to light touch intact in bilateral upper and lower extremities .    Reflexes  BUE reflexes diminished throughout   Bilateral patellar reflexes absent   Bilateral achilles reflexes 1+    No involuntary movements or rhythmic seizure like activity noted throughout exam .      Lab Results: I have personally reviewed pertinent reports.  Recent  Results (from the past 24 hour(s))   CBC    Collection Time: 10/23/24  6:36 AM   Result Value Ref Range    WBC 9.18 4.31 - 10.16 Thousand/uL    RBC 3.63 (L) 3.88 - 5.62 Million/uL    Hemoglobin 11.2 (L) 12.0 - 17.0 g/dL    Hematocrit 36.0 (L) 36.5 - 49.3 %    MCV 99 (H) 82 - 98 fL    MCH 30.9 26.8 - 34.3 pg    MCHC 31.1 (L) 31.4 - 37.4 g/dL    RDW 18.7 (H) 11.6 - 15.1 %    Platelets 238 149 - 390 Thousands/uL    MPV 10.4 8.9 - 12.7 fL   Basic metabolic panel    Collection Time: 10/23/24  6:36 AM   Result Value Ref Range    Sodium 137 135 - 147 mmol/L    Potassium 4.2 3.5 - 5.3 mmol/L    Chloride 97 96 - 108 mmol/L    CO2 34 (H) 21 - 32 mmol/L    ANION GAP 6 4 - 13 mmol/L    BUN 12 5 - 25 mg/dL    Creatinine 0.45 (L) 0.60 - 1.30 mg/dL    Glucose 101 65 - 140 mg/dL    Calcium 8.9 8.4 - 10.2 mg/dL    eGFR 144 ml/min/1.73sq m   Magnesium    Collection Time: 10/23/24  6:36 AM   Result Value Ref Range    Magnesium 2.0 1.9 - 2.7 mg/dL   Phosphorus    Collection Time: 10/23/24  6:36 AM   Result Value Ref Range    Phosphorus 3.1 2.7 - 4.5 mg/dL   Calcium, ionized    Collection Time: 10/23/24  6:36 AM   Result Value Ref Range    Calcium, Ionized 1.16 1.12 - 1.32 mmol/L     Imaging Studies: I have personally reviewed pertinent reports and I have personally reviewed pertinent films in PACS.    EKG, Pathology, and Other Studies: I have personally reviewed pertinent reports.    VTE Pharmacologic Prophylaxis: Eliquis (now held with plan for LP)    Dictation voice to text software has been used in the creation of this document.  Please consider this in light of any contextual or grammatical errors.

## 2024-10-23 NOTE — PLAN OF CARE
Problem: Prexisting or High Potential for Compromised Skin Integrity  Goal: Skin integrity is maintained or improved  Description: INTERVENTIONS:  - Identify patients at risk for skin breakdown  - Assess and monitor skin integrity  - Assess and monitor nutrition and hydration status  - Monitor labs   - Assess for incontinence   - Turn and reposition patient  - Assist with mobility/ambulation  - Relieve pressure over bony prominences  - Avoid friction and shearing  - Provide appropriate hygiene as needed including keeping skin clean and dry  - Evaluate need for skin moisturizer/barrier cream  - Collaborate with interdisciplinary team   - Patient/family teaching  - Consider wound care consult   Outcome: Progressing     Problem: PAIN - ADULT  Goal: Verbalizes/displays adequate comfort level or baseline comfort level  Description: Interventions:  - Encourage patient to monitor pain and request assistance  - Assess pain using appropriate pain scale  - Administer analgesics based on type and severity of pain and evaluate response  - Implement non-pharmacological measures as appropriate and evaluate response  - Consider cultural and social influences on pain and pain management  - Notify physician/advanced practitioner if interventions unsuccessful or patient reports new pain  Outcome: Progressing     Problem: INFECTION - ADULT  Goal: Absence or prevention of progression during hospitalization  Description: INTERVENTIONS:  - Assess and monitor for signs and symptoms of infection  - Monitor lab/diagnostic results  - Monitor all insertion sites, i.e. indwelling lines, tubes, and drains  - Monitor endotracheal if appropriate and nasal secretions for changes in amount and color  - [place] appropriate cooling/warming therapies per order  - Administer medications as ordered  - Instruct and encourage patient and family to use good hand hygiene technique  - Identify and instruct in appropriate isolation precautions for  identified infection/condition  Outcome: Progressing  Goal: Absence of fever/infection during neutropenic period  Description: INTERVENTIONS:  - Monitor WBC    Outcome: Progressing     Problem: SAFETY ADULT  Goal: Patient will remain free of falls  Description: INTERVENTIONS:  - Educate patient/family on patient safety including physical limitations  - Instruct patient to call for assistance with activity   - Consult OT/PT to assist with strengthening/mobility   - Keep Call bell within reach  - Keep bed low and locked with side rails adjusted as appropriate  - Keep care items and personal belongings within reach  - Initiate and maintain comfort rounds  - Make Fall Risk Sign visible to staff  - Offer Toileting every 2 Hours, in advance of need  - Initiate/Maintain bed alarm  - Obtain necessary fall risk management equipment  - Apply yellow socks and bracelet for high fall risk patients  - Consider moving patient to room near nurses station  Outcome: Progressing  Goal: Maintain or return to baseline ADL function  Description: INTERVENTIONS:  -  Assess patient's ability to carry out ADLs; assess patient's baseline for ADL function and identify physical deficits which impact ability to perform ADLs (bathing, care of mouth/teeth, toileting, grooming, dressing, etc.)  - Assess/evaluate cause of self-care deficits   - Assess range of motion  - Assess patient's mobility; develop plan if impaired  - Assess patient's need for assistive devices and provide as appropriate  - Encourage maximum independence but intervene and supervise when necessary  - Involve family in performance of ADLs  - Assess for home care needs following discharge   - Consider OT consult to assist with ADL evaluation and planning for discharge  - Provide patient education as appropriate  Outcome: Progressing  Goal: Maintains/Returns to pre admission functional level  Description: INTERVENTIONS:  - Perform AM-PAC 6 Click Basic Mobility/ Daily Activity  assessment daily.  - Set and communicate daily mobility goal to care team and patient/family/caregiver.   - Collaborate with rehabilitation services on mobility goals if consulted  - Perform Range of Motion 3 times a day.  - Reposition patient every 2 hours.  - Dangle patient 3 times a day  - Stand patient 3 times a day  - Ambulate patient 3 times a day  - Out of bed to chair 3 times a day   - Out of bed for meals 3 times a day  - Out of bed for toileting  - Record patient progress and toleration of activity level   Outcome: Progressing     Problem: DISCHARGE PLANNING  Goal: Discharge to home or other facility with appropriate resources  Description: INTERVENTIONS:  - Identify barriers to discharge w/patient and caregiver  - Arrange for needed discharge resources and transportation as appropriate  - Identify discharge learning needs (meds, wound care, etc.)  - Arrange for interpretive services to assist at discharge as needed  - Refer to Case Management Department for coordinating discharge planning if the patient needs post-hospital services based on physician/advanced practitioner order or complex needs related to functional status, cognitive ability, or social support system  Outcome: Progressing     Problem: Knowledge Deficit  Goal: Patient/family/caregiver demonstrates understanding of disease process, treatment plan, medications, and discharge instructions  Description: Complete learning assessment and assess knowledge base.  Interventions:  - Provide teaching at level of understanding  - Provide teaching via preferred learning methods  Outcome: Progressing     Problem: SAFETY,RESTRAINT: NV/NON-SELF DESTRUCTIVE BEHAVIOR  Goal: Remains free of harm/injury (restraint for non violent/non self-detsructive behavior)  Description: INTERVENTIONS:  - Instruct patient/family regarding restraint use   - Assess and monitor physiologic and psychological status   - Provide interventions and comfort measures to meet  assessed patient needs   - Identify and implement measures to help patient regain control  - Assess readiness for release of restraint   Outcome: Progressing  Goal: Returns to optimal restraint-free functioning  Description: INTERVENTIONS:  - Assess the patient's behavior and symptoms that indicate continued need for restraint  - Identify and implement measures to help patient regain control  - Assess readiness for release of restraint   Outcome: Progressing     Problem: Nutrition/Hydration-ADULT  Goal: Nutrient/Hydration intake appropriate for improving, restoring or maintaining nutritional needs  Description: Monitor and assess patient's nutrition/hydration status for malnutrition. Collaborate with interdisciplinary team and initiate plan and interventions as ordered.  Monitor patient's weight and dietary intake as ordered or per policy. Utilize nutrition screening tool and intervene as necessary. Determine patient's food preferences and provide high-protein, high-caloric foods as appropriate.     INTERVENTIONS:  - Monitor oral intake, urinary output, labs, and treatment plans  - Assess nutrition and hydration status and recommend course of action  - Evaluate amount of meals eaten  - Assist patient with eating if necessary   - Allow adequate time for meals  - Recommend/ encourage appropriate diets, oral nutritional supplements, and vitamin/mineral supplements  - Order, calculate, and assess calorie counts as needed  - Recommend, monitor, and adjust tube feedings and TPN/PPN based on assessed needs  - Assess need for intravenous fluids  - Provide specific nutrition/hydration education as appropriate  - Include patient/family/caregiver in decisions related to nutrition  Outcome: Progressing

## 2024-10-23 NOTE — ASSESSMENT & PLAN NOTE
Intermittent periods of agitation, restless however alert oriented x 4 without loss of consciousness over the past few days  MRI on 10/16 with hypoxic brain injury findings  Goals of care discussions were currently continues with disease treatment palliative care has been follow now signed off as goals of care are clear.  Neurology following recommendations to repeat paraneoplastic workup and an MRI in about a month  [id number]: Culture/stain 2+ poly, 2+ gram-positive rods, 1+ gram-negative rods growing.      Plan  Continue monitor mental status  Per family patient is at baseline events has been persistent over the past year  Regards to restless agitation bearing-down has responded to gabapentin now at 300 TID and Ativan as needed; episodes have reduced intensity   Continue monitoring metabolic derangement  Completed 10-day course of antibiotics for Pseudomonas and Proteus mirabilis pneumonia  Monitor off of antibiotics

## 2024-10-23 NOTE — ASSESSMENT & PLAN NOTE
"Hemanth Swanson is a 39 y.o. 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 Patient initially presented on 10/5/2024 to [facility] after pulling out G-tube. He was noted to be tachycardic, hypoxic, and febrile with leukocytosis. PNA concern on chest imaging. He was subsequently transferred to [facility] ICU for further monitoring and management. Bronchoscopy cultures positive for Proteus and Pseudomonas.     Neurology initially consulted on 10/09 for seizure like activity in the setting of hypoxia/bradycardia/hypotension. Episodes were captured on video EEG and without correlates, thus felt to be \"vagal response and possibly convulsive syncope in the setting of hypoxia/bradycardia.  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 Unclear etiology at this time. Recommended repeat MRI in 1 month to re-evaluate findings.    Neurology asked to reevaluate the patient again on 10/23/2024 for abnormal MRI C-spine (10/23) findings which demonstrated long segment of abnormal signal in the cervical and upper thoracic spine concerning for transverse myelitis.    Workup:  - 10/9 CT head: \"No acute intracranial abnormality\"  - 10/10 vEEG:   \"This 18 hour continuous video-EEG recording is abnormal. Multiple events of rocking up and down in bed (as described above) and multiple events of left arm shaking, jaw " "tremor, at times with upward eye deviation, as well as one event of body stiffening were captured, all without EEG changes suggesting these events were not seizures. Intermittent left temporal delta activities suggest an underlying area of neuronal dysfunction. No electrographic seizures or interictal epileptiform discharges were seen.\"  - 10/16 MRI brain w wo:   \"Mildly restricted diffusion in the splenium of the corpus callosum and more subtly in the anterior cingulate gyri. Possible sequela of hypoxic ischemic injury, inflammatory or infectious process.\"  - 10/23 MRI C-spine w wo:  \"Long segment of abnormal signal in the cervical and upper thoracic spine favored to represent nonspecific transverse myelitis.  No cord expansion, atrophy or abnormal enhancement.  Recommend clinical correlation and further evaluation with CSF. Recommend 3-month follow-up contrast-enhanced MRI.\"    Plan:  - Recommend repeat LP, likely with IR if possible   Plan to hold Eliquis   CSF labs to be obtained: RBC, WBC, total protein, glucose, gram stain, culture, MS panel, Anti MOG Antibody CSF (miscellaneous study), paraneoplastic panel   - Hold off on initiating IV steroids at this time, will obtain LP first   - Pending: Paraneoplastic profile serum study  - Recommend the following serum studies:   NMO, Anti MOG antibody serum (miscellaneous study), copper, Sjogren's, vitamin B12, vitamin B1, folate, Lyme, RPR  - On Eliquis for thromboembolism/PE; plan to hold for LP  - Monitor neuro exam; notify with any changes  - Medical management and supportive care per ICU team. Correction of any metabolic or infectious disturbances.   "

## 2024-10-23 NOTE — OCCUPATIONAL THERAPY NOTE
Occupational Therapy Cancellation Note     Patient Name: Hemanth Swanson  Today's Date: 10/23/2024     10/23/24 1030   Note Type   Note type Cancelled Session   Cancel Reasons Medical status   Additional Comments OT consult remains active, however per ICU mobility rounds, patient remains inappropriate for participation in OT evaluation d/t medical status. Will hold and continue to f/u as able.     Merced Forte MS OTR/L   [place] Licensure# [id number]

## 2024-10-23 NOTE — ASSESSMENT & PLAN NOTE
Neuromuscular disease post chemotherapyS/P tracheostomy in 9/2023.  He continues to fail SBT likely in the setting of diaphragmatic weakness in the setting of progressive demyelinating/ neuromuscular disease  Goals of care discussions continues disease driven  X-ray showed worsening opacities 10/21  Multiple bronchoscopies resulted thick mucus secretions  Sputum/bronc culture showing growth of Pseudomonas (s/p 10 days treatment for Pseudomonas/Proteus)    Plan:  Continue vent support  Xopnex and hypertonic saline nebs 3 times daily  Re-started chest therapy (previously halted due to concern they were precipitating vasovagal episodes)  Consider bronchoscopy for symptomatic relief as needed

## 2024-10-23 NOTE — PROGRESS NOTES
Progress Note - Critical Care/ICU   Name: Hemanth Swanson 39 y.o. male I MRN: [id number]  Unit/Bed#: ICU 05 I Date of Admission: 10/5/2024   Date of Service: 10/23/2024 I Hospital Day: 18       Assessment & Plan  Toxic metabolic encephalopathy  Intermittent periods of agitation, restless however alert oriented x 4 without loss of consciousness over the past few days  MRI on 10/16 with hypoxic brain injury findings  Goals of care discussions were currently continues with disease treatment palliative care has been follow now signed off as goals of care are clear.  Neurology following recommendations to repeat paraneoplastic workup and an MRI in about a month  [id number]: Culture/stain 2+ poly, 2+ gram-positive rods, 1+ gram-negative rods growing.      Plan  Continue monitor mental status  Per family patient is at baseline events has been persistent over the past year  Regards to restless agitation bearing-down has responded to gabapentin now at 300 TID and Ativan as needed; episodes have reduced intensity   Continue monitoring metabolic derangement  Completed 10-day course of antibiotics for Pseudomonas and Proteus mirabilis pneumonia  Monitor off of antibiotics   Chronic respiratory failure with hypoxia and hypercapnia (HCC)  Neuromuscular disease post chemotherapyS/P tracheostomy in 9/2023.  He continues to fail SBT likely in the setting of diaphragmatic weakness in the setting of progressive demyelinating/ neuromuscular disease  Goals of care discussions continues disease driven  X-ray showed worsening opacities 10/21  Multiple bronchoscopies resulted thick mucus secretions  Sputum/bronc culture showing growth of Pseudomonas (s/p 10 days treatment for Pseudomonas/Proteus)    Plan:  Continue vent support  Xopnex and hypertonic saline nebs 3 times daily  Re-started chest therapy (previously halted due to concern they were precipitating vasovagal episodes)  Consider bronchoscopy for symptomatic relief as  needed    Bradycardia  Intermittent bradycardia not related to hypoxia.   He has been maintaining heart rates above 60 since medication adjustments.  Seroquel discontinue  Heart rate between episodes fluctuates from low 80s high 90s.  Continues to experience desaturations/bradycardic episodes likely vasovagal in nature  Removed scheduled benzodiazepines and opioids.  Episodes continue but have decreased in intensity     Plan  -Currently treating with Ativan as needed and manual bagging/suction   -Increased gabapentin to 300 mg 3 times daily  Sepsis (HCC)  Sepsis likely secondary to multifocal pneumonia in the setting of Proteus and Pseudomonas day 10 on antibiotics today for completion.   Now shock undifferentiated consider hypovolemic in the setting of insensible losses, echocardiogram has been remained unremarkable doubt sepsis at this time given adequate treatment on antibiotics for 10 days  Blood pressure remained with maps above 70 overnight.  He did not require pressors.  Will consider POCUS disease continuing evaluate IVC urine output has slowed down  Plan  If pressures are low, will trial of 500 cc Isolyte with monitoring urinary output.   Seizure-like activity (HCC)  No evidence of seizure on EMU on multiple admissions including current one  Seizure-like activity is likely stereotypical behaviors likely related to agitation; responding well to Ativan as needed  Umbilical hernia without obstruction and without gangrene  Evaluated by surgery.  Found to not be a surgical candidate due to ongoing comorbidities.  Fat containing incarcerated hernia without bowel.  Palliative care by specialist  Palliative care is following.  Had long discussion with family about goals of care.  They would like to continue all measures.  The are willing to care for him at home.  They reiterated their wishes that he would not like to be sent to a rehab facility.  Goals of care, counseling/discussion  Goals of care discussions  multiple admissions including current 1 palliative care follow-up continues with disease driven treatments  Discussed with mother, sister patient unfortunately continues with poor prognosis  Patient has 24/7 support at home  Case management following assistance upon discharge  Abnormal MRI  Noted with abnormal MRI concerning for hypoxic injury  Neurology on board recommendations to repeat paraneoplastic workup repeat MRI in about a month  Following a discussion with PM&R will complete MRI C-Spine with and without contrast for further evaluation.   Disposition: Critical care    ICU Core Measures     Vented Patient  VAP Bundle  VAP bundle ordered     A: Assess, Prevent, and Manage Pain Has pain been assessed? Yes  Need for changes to pain regimen? No   B: Both Spontaneous Awakening Trials (SATs) and Spontaneous Breathing Trials (SBTs) Plan to perform spontaneous awakening trial today? No secondary to vent dependence secondary to demyelination disorder  Plan to perform spontaneous breathing trial today? No secondary to vent dependence  Obvious barriers to extubation? Yes   C: Choice of Sedation RASS Goal: 0 Alert and Calm  Need for changes to sedation or analgesia regimen? No   D: Delirium CAM-ICU: Negative   E: Early Mobility  Plan for early mobility? Yes   F: Family Engagement Plan for family engagement today? Yes       Review of Invasive Devices:      Central access plan: Medications requiring central line      Prophylaxis:  VTE VTE covered by:  apixaban, Per PEG Tube, 5 mg at 10/23/24 0801       Stress Ulcer  not ordered         24 Hour Events : No acute events significant acute events.  Required several incidences of bagging but episodes have decreased in intensity.  Subjective doing well, continues to be alert and calm, following all commands denies pain.      Objective :                   Vitals I/O      Most Recent Min/Max in 24hrs   Temp (!) 97.2 °F (36.2 °C) Temp  Min: 97.2 °F (36.2 °C)  Max: 98.1 °F (36.7  °C)   Pulse 86 Pulse  Min: 67  Max: 95   Resp 18 Resp  Min: 2  Max: 18   /62 BP  Min: 95/56  Max: 151/93   O2 Sat 96 % SpO2  Min: 92 %  Max: 100 %      Intake/Output Summary (Last 24 hours) at 10/23/2024 0834  Last data filed at 10/23/2024 0600  Gross per 24 hour   Intake 2467 ml   Output 252 ml   Net 2215 ml       Diet Enteral/Parenteral; Tube Feeding No Oral Diet; Jevity 1.5; Continuous; 70; 200; Water; Every 4 hours    Invasive Monitoring           Physical Exam   Physical Exam  Vitals reviewed.   Eyes:      General: No foreign body in eyeNo scleral icterus.        Right eye: No discharge.         Left eye: No discharge.      Extraocular Movements: Extraocular movements intact.      Conjunctiva/sclera: Conjunctivae normal.   Skin:     Findings: Wound (posterior R. hand) present.   HENT:      Head: Normocephalic.      Nose: No rhinorrhea.   Neck:      Trachea: Tracheostomy present.   Cardiovascular:      Rate and Rhythm: Normal rate and regular rhythm.   Musculoskeletal:      Right lower leg: Trace Edema present.      Left lower leg: Trace Edema present.   Constitutional:       General: He is not in acute distress.     Interventions: He is intubated and restrained (RUE).   Pulmonary:      Effort: No accessory muscle usage or accessory muscle usage. He is intubated.      Breath sounds: No stridor. Rhonchi present. No wheezing.   Chest:      Chest wall: No tenderness.   Neurological:      Cranial Nerves: No facial asymmetry.   Genitourinary/Anorectal:  external catheter present.        Diagnostic Studies        Lab Results: I have reviewed the following results:     Medications:  Scheduled PRN   apixaban, 5 mg, BID  chlorhexidine, 15 mL, Q12H ISAEL  gabapentin, 300 mg, Q8H  guaiFENesin, 400 mg, Q6H  levalbuterol, 1.25 mg, Q6H  melatonin, 3 mg, HS  midodrine, 10 mg, Q8H  sodium chloride, 4 mL, Q6H      acetaminophen, 650 mg, Q6H PRN  bisacodyl, 10 mg, Daily PRN  fentaNYL, 50 mcg, Q15 Min PRN  LORazepam, 2 mg, Q4H  PRN  oxyCODONE, 5 mg, Q6H PRN       Continuous    propofol, 5-50 mcg/kg/min         Labs:   CBC    Recent Labs     10/22/24  0528 10/23/24  0636   WBC 9.81 9.18   HGB 10.3* 11.2*   HCT 33.1* 36.0*    238     BMP    Recent Labs     10/22/24  0528 10/23/24  0636   SODIUM 134* 137   K 4.1 4.2    97   CO2 34* 34*   AGAP 0* 6   BUN 10 12   CREATININE 0.43* 0.45*   CALCIUM 8.5 8.9       Coags    No recent results     Additional Electrolytes  Recent Labs     10/22/24  0528 10/23/24  0636   MG 1.9 2.0   PHOS 3.4 3.1   CAIONIZED 1.13 1.16          Blood Gas    No recent results  No recent results LFTs  No recent results    Infectious  No recent results  Glucose  Recent Labs     10/22/24  0528 10/23/24  0636   GLUC 113 101

## 2024-10-23 NOTE — ASSESSMENT & PLAN NOTE
Noted with abnormal MRI concerning for hypoxic injury  Neurology on board recommendations to repeat paraneoplastic workup repeat MRI in about a month  Following a discussion with PM&R will complete MRI C-Spine with and without contrast for further evaluation.

## 2024-10-23 NOTE — PHYSICAL THERAPY NOTE
Physical Therapy Cancellation Note       10/23/24 1031   Note Type   Note type Cancelled Session   Cancel Reasons Medical status   Additional Comments PT consult remains active, however per RN Suzan during ICU mobility rounds, pt remains inappropriate for participation in PT evaluation d/t medical status. Will continue to hold and f/u as able.       Namrata Blas, PT, DPT   Available via [employer]  NPI # [id number]  [place] License - [id number]  10/23/2024

## 2024-10-23 NOTE — ASSESSMENT & PLAN NOTE
- Noted with HR dropping as low as 30-40s  - Received atropine on 10/8 for episode of arrest/pause and on 10/9 for bradycardia  - Cardiology following; appreciate recommendations  - No recommendation for PPM at this time  - Suspected to be in the setting of vasovagal/agitation  - Telemetry  - Medical management per ICU and Cardiology

## 2024-10-23 NOTE — ASSESSMENT & PLAN NOTE
- See above, status post video EEG this admission   - Captured events without EEG correlate; felt to be related to vagal response/convulsive syncope in the setting hypoxia/bradycardia/hypotension  - No need for AED initiation

## 2024-10-23 NOTE — ASSESSMENT & PLAN NOTE
- Noted to be febrile, tachycardic, with elevated WBC/lactic acid on presentation  - Initial CXR: Complete opacification of the left hemithorax with mediastinal shift to the left indicating at least in part atelectasis.   - COVID/flu/RSV negative  - Blood cultures, UA negative  - Sputum culture positive for Proteus mirabilis, Pseudomonas aeruginosa  - Antibiotic management per primary team  - Medical management per ICU

## 2024-10-24 LAB
ANION GAP SERPL CALCULATED.3IONS-SCNC: 3 MMOL/L (ref 4–13)
BUN SERPL-MCNC: 11 MG/DL (ref 5–25)
CALCIUM SERPL-MCNC: 8.6 MG/DL (ref 8.4–10.2)
CHLORIDE SERPL-SCNC: 96 MMOL/L (ref 96–108)
CO2 SERPL-SCNC: 37 MMOL/L (ref 21–32)
CREAT SERPL-MCNC: 0.4 MG/DL (ref 0.6–1.3)
ENA SS-A AB SER-ACNC: <0.2 AI (ref 0–0.9)
ENA SS-B AB SER-ACNC: <0.2 AI (ref 0–0.9)
ERYTHROCYTE [DISTWIDTH] IN BLOOD BY AUTOMATED COUNT: 18.6 % (ref 11.6–15.1)
GFR SERPL CREATININE-BSD FRML MDRD: 151 ML/MIN/1.73SQ M
GLUCOSE SERPL-MCNC: 108 MG/DL (ref 65–140)
HCT VFR BLD AUTO: 31.2 % (ref 36.5–49.3)
HGB BLD-MCNC: 9.7 G/DL (ref 12–17)
MCH RBC QN AUTO: 30.5 PG (ref 26.8–34.3)
MCHC RBC AUTO-ENTMCNC: 31.1 G/DL (ref 31.4–37.4)
MCV RBC AUTO: 98 FL (ref 82–98)
PLATELET # BLD AUTO: 195 THOUSANDS/UL (ref 149–390)
PMV BLD AUTO: 10.1 FL (ref 8.9–12.7)
POTASSIUM SERPL-SCNC: 4 MMOL/L (ref 3.5–5.3)
RBC # BLD AUTO: 3.18 MILLION/UL (ref 3.88–5.62)
SODIUM SERPL-SCNC: 136 MMOL/L (ref 135–147)
WBC # BLD AUTO: 7.42 THOUSAND/UL (ref 4.31–10.16)

## 2024-10-24 PROCEDURE — 94003 VENT MGMT INPAT SUBQ DAY: CPT

## 2024-10-24 PROCEDURE — 11102 TANGNTL BX SKIN SINGLE LES: CPT | Performed by: DERMATOLOGY

## 2024-10-24 PROCEDURE — 99233 SBSQ HOSP IP/OBS HIGH 50: CPT | Performed by: PSYCHIATRY & NEUROLOGY

## 2024-10-24 PROCEDURE — 88346 IMFLUOR 1ST 1ANTB STAIN PX: CPT | Performed by: STUDENT IN AN ORGANIZED HEALTH CARE EDUCATION/TRAINING PROGRAM

## 2024-10-24 PROCEDURE — 94640 AIRWAY INHALATION TREATMENT: CPT

## 2024-10-24 PROCEDURE — 94150 VITAL CAPACITY TEST: CPT

## 2024-10-24 PROCEDURE — 88341 IMHCHEM/IMCYTCHM EA ADD ANTB: CPT | Performed by: STUDENT IN AN ORGANIZED HEALTH CARE EDUCATION/TRAINING PROGRAM

## 2024-10-24 PROCEDURE — 88312 SPECIAL STAINS GROUP 1: CPT | Performed by: STUDENT IN AN ORGANIZED HEALTH CARE EDUCATION/TRAINING PROGRAM

## 2024-10-24 PROCEDURE — 11103 TANGNTL BX SKIN EA SEP/ADDL: CPT | Performed by: DERMATOLOGY

## 2024-10-24 PROCEDURE — 88313 SPECIAL STAINS GROUP 2: CPT | Performed by: STUDENT IN AN ORGANIZED HEALTH CARE EDUCATION/TRAINING PROGRAM

## 2024-10-24 PROCEDURE — 85027 COMPLETE CBC AUTOMATED: CPT

## 2024-10-24 PROCEDURE — 88342 IMHCHEM/IMCYTCHM 1ST ANTB: CPT | Performed by: STUDENT IN AN ORGANIZED HEALTH CARE EDUCATION/TRAINING PROGRAM

## 2024-10-24 PROCEDURE — 94668 MNPJ CHEST WALL SBSQ: CPT

## 2024-10-24 PROCEDURE — 80048 BASIC METABOLIC PNL TOTAL CA: CPT

## 2024-10-24 PROCEDURE — 99232 SBSQ HOSP IP/OBS MODERATE 35: CPT | Performed by: STUDENT IN AN ORGANIZED HEALTH CARE EDUCATION/TRAINING PROGRAM

## 2024-10-24 PROCEDURE — 94760 N-INVAS EAR/PLS OXIMETRY 1: CPT

## 2024-10-24 PROCEDURE — 82595 ASSAY OF CRYOGLOBULIN: CPT

## 2024-10-24 PROCEDURE — 88350 IMFLUOR EA ADDL 1ANTB STN PX: CPT | Performed by: STUDENT IN AN ORGANIZED HEALTH CARE EDUCATION/TRAINING PROGRAM

## 2024-10-24 PROCEDURE — 0HB6XZX EXCISION OF BACK SKIN, EXTERNAL APPROACH, DIAGNOSTIC: ICD-10-PCS | Performed by: INTERNAL MEDICINE

## 2024-10-24 PROCEDURE — 99254 IP/OBS CNSLTJ NEW/EST MOD 60: CPT | Performed by: DERMATOLOGY

## 2024-10-24 PROCEDURE — 94669 MECHANICAL CHEST WALL OSCILL: CPT

## 2024-10-24 PROCEDURE — 88305 TISSUE EXAM BY PATHOLOGIST: CPT | Performed by: STUDENT IN AN ORGANIZED HEALTH CARE EDUCATION/TRAINING PROGRAM

## 2024-10-24 RX ORDER — CLOBETASOL PROPIONATE 0.5 MG/G
CREAM TOPICAL 2 TIMES DAILY
Status: DISCONTINUED | OUTPATIENT
Start: 2024-10-24 | End: 2024-10-29

## 2024-10-24 RX ORDER — MIDODRINE HYDROCHLORIDE 5 MG/1
5 TABLET ORAL EVERY 8 HOURS
Status: COMPLETED | OUTPATIENT
Start: 2024-10-24 | End: 2024-10-25

## 2024-10-24 RX ORDER — FENTANYL CITRATE 50 UG/ML
50 INJECTION, SOLUTION INTRAMUSCULAR; INTRAVENOUS EVERY 2 HOUR PRN
Status: DISCONTINUED | OUTPATIENT
Start: 2024-10-24 | End: 2024-10-31 | Stop reason: HOSPADM

## 2024-10-24 RX ADMIN — MIDODRINE HYDROCHLORIDE 5 MG: 5 TABLET ORAL at 17:17

## 2024-10-24 RX ADMIN — Medication 4 ML: at 07:09

## 2024-10-24 RX ADMIN — GABAPENTIN 300 MG: 250 SOLUTION ORAL at 00:02

## 2024-10-24 RX ADMIN — GUAIFENESIN 400 MG: 200 SOLUTION ORAL at 17:17

## 2024-10-24 RX ADMIN — Medication 3 MG: at 22:20

## 2024-10-24 RX ADMIN — CHLORHEXIDINE GLUCONATE 15 ML: 1.2 RINSE ORAL at 08:01

## 2024-10-24 RX ADMIN — GUAIFENESIN 400 MG: 200 SOLUTION ORAL at 10:59

## 2024-10-24 RX ADMIN — MIDODRINE HYDROCHLORIDE 5 MG: 5 TABLET ORAL at 08:01

## 2024-10-24 RX ADMIN — Medication 4 ML: at 02:57

## 2024-10-24 RX ADMIN — LEVALBUTEROL HYDROCHLORIDE 1.25 MG: 1.25 SOLUTION RESPIRATORY (INHALATION) at 19:51

## 2024-10-24 RX ADMIN — MIDODRINE HYDROCHLORIDE 5 MG: 5 TABLET ORAL at 00:02

## 2024-10-24 RX ADMIN — GUAIFENESIN 400 MG: 200 SOLUTION ORAL at 04:34

## 2024-10-24 RX ADMIN — CLOBETASOL PROPIONATE 1 APPLICATION: 0.5 CREAM TOPICAL at 18:51

## 2024-10-24 RX ADMIN — LEVALBUTEROL HYDROCHLORIDE 1.25 MG: 1.25 SOLUTION RESPIRATORY (INHALATION) at 13:51

## 2024-10-24 RX ADMIN — CLOBETASOL PROPIONATE: 0.5 CREAM TOPICAL at 15:10

## 2024-10-24 RX ADMIN — LEVALBUTEROL HYDROCHLORIDE 1.25 MG: 1.25 SOLUTION RESPIRATORY (INHALATION) at 07:09

## 2024-10-24 RX ADMIN — LEVALBUTEROL HYDROCHLORIDE 1.25 MG: 1.25 SOLUTION RESPIRATORY (INHALATION) at 02:57

## 2024-10-24 RX ADMIN — CHLORHEXIDINE GLUCONATE 15 ML: 1.2 RINSE ORAL at 20:28

## 2024-10-24 RX ADMIN — GABAPENTIN 300 MG: 250 SOLUTION ORAL at 17:17

## 2024-10-24 RX ADMIN — GABAPENTIN 300 MG: 250 SOLUTION ORAL at 23:15

## 2024-10-24 RX ADMIN — GABAPENTIN 300 MG: 250 SOLUTION ORAL at 08:01

## 2024-10-24 RX ADMIN — GUAIFENESIN 400 MG: 200 SOLUTION ORAL at 00:02

## 2024-10-24 RX ADMIN — GUAIFENESIN 400 MG: 200 SOLUTION ORAL at 22:49

## 2024-10-24 NOTE — TELEPHONE ENCOUNTER
STILL ADMITTED:10/5/2024 - present (19 days)  [facility]        Joycelyn Aponte PA-C  [facility]charge  Hemanth Swanson will need follow-up in in 4 weeks with neuromuscular team for Other in 60 minute appointment. They will not require outpatient neurological testing.

## 2024-10-24 NOTE — WOUND OSTOMY CARE
Progress Note - Wound   Hemanth Swanson 39 y.o. male MRN: [id number]  Unit/Bed#: ICU 05 Encounter: [id number]        Assessment:   Patient is seen for wound care follow-up of right hand wound. Patient in ICU, on low air loss mattress, wedges and true fredi boots in place for offloading. Patient is incontinent of bowel and indwelling puga catheter in place for urine management. Patient is dependent with all care. Patient receives enteral nutrition.     Findings:  B/L heels are dry intact and tiffanie with no skin loss or wounds present. Recommend preventative Hydraguard Cream and proper offloading/ repositioning.  Continue offloading boots.     B/L sacrum and buttocks is dry intact and blanches with no skin loss or wounds present; scar tissue noted. Recommend preventative Hydraguard Cream and proper offloading/ repositioning.    Right posterior hand - traumatic wound from patient hitting hand against bedside rail, now full thickness with 70% yellow slough tissue and 30% beefy red tissue/ Donna wound with edema and erythema, scant serosanguineous drainage. Continue silver gel (NormlGel) and silicone foam dressing.     Left ear - site of previous pressure injury, now resolved. Skin is pink, epithelized and intact. Appears fragile. No open aspects or drainage noted. Can apply 3M no sting barrier film daily as protective barrier. Patient observed favoring lying to left side.       No induration, fluctuance, odor, warmth/temperature differences, redness, or purulence noted to the above noted wounds and skin areas assessed. New dressings applied per orders listed below. Patient tolerated well- no s/s of non-verbal pain or discomfort observed during the encounter. Bedside nurse aware of plan of care. See flow sheets for more detailed assessment findings.      Orders listed below and wound care will continue to follow, call or Secure Chat with questions.     Skin care plans:  1-Hydraguard to bilateral sacrum, buttock and heels  BID and PRN  2-Elevate heels to offload pressure.  3-Ehob cushion in chair when out of bed.  4-Moisturize skin daily with skin nourishing cream.  5-Turn/reposition q2h for pressure re-distribution on skin.  6-Cleanse right hand with normal saline, apply normalgel to wound bed, cover with silicone bordered foam. Change daily and PRN soilage/displacement.  7-left ear: cleanse with NSS soaked gauze. Pat dry. Cover area with 3M no sting barrier daily.      WOUNDS:  Wound 10/08/24 Traumatic Skin tear Hand Posterior;Right (Active)   Wound Image   10/24/24 1131   Wound Description Yellow;Slough 10/24/24 1200   Donna-wound Assessment Edema;Fragile 10/24/24 1200   Wound Length (cm) 0.6 cm 10/24/24 1131   Wound Width (cm) 2 cm 10/24/24 1131   Wound Depth (cm) 0.2 cm 10/24/24 1131   Wound Surface Area (cm^2) 1.2 cm^2 10/24/24 1131   Wound Volume (cm^3) 0.24 cm^3 10/24/24 1131   Calculated Wound Volume (cm^3) 0.24 cm^3 10/24/24 1131   Change in Wound Size % 25 10/24/24 1131   Wound Site Closure Open to air 10/22/24 0400   Drainage Amount Scant 10/24/24 1131   Drainage Description Serosanguineous 10/24/24 1131   Non-staged Wound Description Full thickness 10/24/24 1131   Treatments Cleansed;Irrigation with NSS;Site care 10/24/24 1131   Dressing Foam, Silicon (eg. Allevyn, etc);Silvasorb gel 10/24/24 1200   Wound packed? No 10/24/24 1131   Packing- # removed 0 10/24/24 1131   Packing- # inserted 0 10/24/24 1131   Dressing Changed Reinforced 10/24/24 1131   Patient Tolerance Tolerated well 10/24/24 1131   Dressing Status Clean;Dry;Intact 10/24/24 1200                Radha Pate RN, BSN, CCRN

## 2024-10-24 NOTE — CONSULTS
DERMATOLOGY:  CONSULTATION   Hemanth Swanson 39 y.o. male MRN: [id number]  Unit/Bed#: ICU 05 Encounter: [id number]          Inpatient consult to Dermatology     Date/Time  10/24/2024 12:09 PM     Performed by  Antonella Carrasco MD   Authorized by  Jus Melendez MD                 DERM CONSULT - How was clinical care provided?: Attending present IN PERSON with Derm Resident and Patient; Resident present in-person with Patient; Patient in hospital setting (ED or INPATIENT):  I discussed this case with the resident and reviewed the resident's note, any prescribed medication and any orders placed. I supervised the resident and agree with the resident's management plan as it was presented to me. I was not physically present in the patient's room; however, I personally participated in the virtual visit with the patient and the resident.      Assessment/Recommendations   Based on a thorough discussion of this condition and the management approach to it (including a comprehensive discussion of the known risks, side effects and potential benefits of treatment), the patient (family) agrees to implement the following specific plan:    Erythematous papular/petechial rash on the left back with overlying desquamation and scale.  Nursing noticed a rash a week ago, however upon speaking with patient's sister has been present for over a year, nothing seems to help resolve it.  Sometimes experiences rash on the legs as well.  Given chronic nature of this rash and distribution, differentials include but are not limited to pemphigus foliaceous versus miliaria rubra versus odd placement Darier's disease versus severe Mcadoo's disease. Discussed verbal consent with patient's sister to perform 2 punch biopsies for definitive diagnosis. She was in agreement after risks and benefits explained.     Plan:  - Two 4 mm punch biopsies performed on upper left back near shoulder crevice; one for H&E and one for DIF  - Apply topical clobetasol cream  to affected area on the back twice daily for no more than 2 weeks  - Please upload daily pictures to monitor progression  - Will follow along with further recs pending biopsy results       Please set up discharge follow up by calling our office: [phone])     Thank you for involving me in the care of your patient. Please call with questions, change in clinical status or if tests recommended above are abnormal.     Discussed with the primary service.      History:     HISTORY OF PRESENT ILLNESS:    Reason for Consult: rash on upper left back  HPI: Hemanth Swanson is a 39 y.o. year old male with past medical history of severe myelinating disease currently with tracheostomy and PEG in the ICU.  Dermatology was consulted for evaluation of a diffuse erythematous and scaly rash along the left upper and lower back.  It was noticed by nursing about a week and a half ago, however when speaking with patient's sister she noticed that the rash has been there for over a year and sometimes spreads to his legs as well.  Does not seem to provide any pain or discomfort to the patient, nothing topically has been applied to the area aside from Vaseline before.  No recent medication changes or contact allergens noted.  Review of systems is limited due to patient being, however no evidence of leukocytosis or fevers, no eosinophilia noted.      ROS:  Negative unless otherwise stated in HPI.      PAST MEDICAL HISTORY:  Past Medical History:   Diagnosis Date    Anxiety     Cancer (HCC)     Depression     Hypernatremia 10/06/2024    Migration of percutaneous endoscopic gastrostomy (PEG) tube (HCC) 09/24/2023    Psychiatric disorder     bipolar     Past Surgical History:   Procedure Laterality Date    IR BIOPSY LYMPH NODE  01/20/2023    IR GASTROSTOMY (G) TUBE CHECK/CHANGE/REINSERTION/UPSIZE  6/18/2024    IR GASTROSTOMY TUBE PLACEMENT  09/25/2023    IR LUMBAR PUNCTURE  09/06/2023    IR PORT PLACEMENT  03/14/2023    ORCHIECTOMY  "Left 2022    Procedure: ORCHIECTOMY INGUINAL;  Surgeon: Flip Michael MD;  Location:  MAIN OR;  Service: Urology    PEG W/TRACHEOSTOMY PLACEMENT N/A 2023    Procedure: TRACHEOSTOMY WITH INSERTION PEG TUBE;  Surgeon: Rudy Mcmanus MD;  Location: [facility];  Service: General    TESTICLE SURGERY         FAMILY HISTORY:  Non-contributory    SOCIAL HISTORY:  Social History   Single  Social History     Substance and Sexual Activity   Alcohol Use Not Currently    Comment: Former alcoholic. Last use in 2016     Social History     Substance and Sexual Activity   Drug Use Not Currently    Types: \"Crack\" cocaine, Marijuana    Comment: Current use medical marijuana. Not currently using crack cocaine.     Social History     Tobacco Use   Smoking Status Former    Current packs/day: 0.00    Average packs/day: 0.7 packs/day for 22.7 years (15.0 ttl pk-yrs)    Types: Cigarettes    Start date: 2008    Quit date: 2023    Years since quittin.4   Smokeless Tobacco Never       ALLERGIES:  Allergies   Allergen Reactions    Dog Epithelium Cough, Sneezing and Nasal Congestion       MEDICATIONS:  All current active medications have been reviewed.       Physical exam:     Temp:  [97.5 °F (36.4 °C)-98.8 °F (37.1 °C)] 98.8 °F (37.1 °C)  HR:  [64-87] 80  Resp:  [18-22] 22  BP: ()/(58-95) 122/77  SpO2:  [91 %-98 %] 96 %  Temp (24hrs), Av.2 °F (36.8 °C), Min:97.5 °F (36.4 °C), Max:98.8 °F (37.1 °C)  Current: Temperature: 98.8 °F (37.1 °C)    PSYCH: Normal mood and affect  EYES: Normal conjunctiva  ENT: Normal lips and oral mucosa  CARDIOVASCULAR: No edema  RESPIRATORY: Normal respirations  HEME/LYMPH/IMMUNO:  No regional lymphadenopathy except as noted below in ASSESSMENT AND PLAN BY DIAGNOSIS    FULL ORGAN SYSTEM SKIN EXAM (SKIN)  Hair, Scalp, Ears, Face Normal except as noted below in Assessment   Neck, Cervical Chain Nodes Normal except as noted below in Assessment   Right Arm/Hand/Fingers Normal " except as noted below in Assessment   Left Arm/Hand/Fingers Normal except as noted below in Assessment   Chest/Breasts/Axillae Viewed areas Normal except as noted below in Assessment   Abdomen, Umbilicus Normal except as noted below in Assessment   Back/Spine Normal except as noted below in Assessment   Groin/Genitalia/Buttocks Viewed areas Normal except as noted below in Assessment   Right Leg, Foot, Toes Normal except as noted below in Assessment   Left Leg, Foot, Toes Normal except as noted below in Assessment        Diffuse erythematous papular/petechial rash with overlying scale and desquamation along the left lateral back. Non tender to touch.               Labs, Imaging, & Other Studies     Lab Results:  I have personally reviewed pertinent labs.     Results from last 7 days   Lab Units 10/24/24  0439 10/23/24  0636 10/22/24  0528   WBC Thousand/uL 7.42 9.18 9.81   HEMOGLOBIN g/dL 9.7* 11.2* 10.3*   PLATELETS Thousands/uL 195 238 209     Results from last 7 days   Lab Units 10/24/24  0439   POTASSIUM mmol/L 4.0   CHLORIDE mmol/L 96   CO2 mmol/L 37*   BUN mg/dL 11   CREATININE mg/dL 0.40*   EGFR ml/min/1.73sq m 151   CALCIUM mg/dL 8.6     Results from last 7 days   Lab Units 10/21/24  1254   GRAM STAIN RESULT  2+ Polys*  2+ Gram positive rods*  1+ Gram negative rods*           Pathology:   I have personally reviewed pertinent reports.     PROCEDURE NOTE:  PUNCH BIOPSY      Performing Physician:  Dr. Carrasco    Anatomic Location; Clinical Description with size (cm); Pre-Op Diagnosis:    38 yo male with diffuse erythematous papular/petechial rash with overlying scale and desquamation on the left back. Ddx: pemphigus foliaceous vs miliaria rubra vs Darier's vs [place]'s  Specimen A: upper back left side for H&E  Specimen B: upper back right side for DIF       Anesthesia: 1% xylocaine with epi       Topical anesthesia: None       Indications: To indicate diagnosis and management plan.    Procedure Details      Patient informed of the risks (including bleeding,scaring and infection) and benefits of the procedure explained. Verbal and written informed consent obtained. The area was prepped and draped in the usual fashion. Anesthesia was obtained with 1% lidocaine with epinephrine. The skin was then stretched perpendicular to the skin tension lines and a punch biopsy to an appropriate sampling depth was obtained with a 4 mm punch with a forceps and iris scissors.     Hemostasis was obtained with 3-0 vicryl x 2 sutures.     Complications:  None      Specimen has been sent for review by Dermatopathology.      Plan:  1. Instructed to keep the wound dry and covered for 24-48h and clean thereafter.  2. Warning signs of infection were reviewed.    3. Recommended that the patient use acetaminophen as needed for pain  4. Sutures are absorbable      Standard post-procedure care has been explained and has been included in written form within the patient's copy of Informed Consent.            Antonella Carrasco MD  Dermatology, PGY-2

## 2024-10-24 NOTE — ASSESSMENT & PLAN NOTE
"Hemanth Swanson is a 39 y.o. 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 Patient initially presented to [facility] on 10/5/2024 after pulling out G-tube. He was noted to be tachycardic, hypoxic, and febrile with leukocytosis. PNA concern on chest imaging. He was subsequently transferred to [facility] ICU for further monitoring and management. Bronchoscopy cultures positive for Proteus and Pseudomonas.     Neurology initially consulted on 10/09 for seizure like activity in the setting of hypoxia/bradycardia/hypotension. Episodes were captured on video EEG and without correlates, thus felt to be \"vagal response and possibly convulsive syncope in the setting of hypoxia/bradycardia.  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 Unclear etiology at this time. Recommended repeat MRI in 1 month to re-evaluate findings.    Neurology asked to reevaluate the patient again on 10/23/2024 for abnormal MRI C-spine (10/23) findings which demonstrated long segment of abnormal signal in the cervical and upper thoracic spine concerning for transverse myelitis.    Workup:  - 10/9 CT head: \"No acute intracranial abnormality\"  - 10/10 vEEG:   \"This 18 hour continuous video-EEG recording is abnormal. Multiple events of rocking up and down in bed (as described above) and multiple events of left arm shaking, jaw " "tremor, at times with upward eye deviation, as well as one event of body stiffening were captured, all without EEG changes suggesting these events were not seizures. Intermittent left temporal delta activities suggest an underlying area of neuronal dysfunction. No electrographic seizures or interictal epileptiform discharges were seen.\"  - 10/16 MRI brain w wo:   \"Mildly restricted diffusion in the splenium of the corpus callosum and more subtly in the anterior cingulate gyri. Possible sequela of hypoxic ischemic injury, inflammatory or infectious process.\"  - 10/23 MRI C-spine w wo:  \"Long segment of abnormal signal in the cervical and upper thoracic spine favored to represent nonspecific transverse myelitis.  No cord expansion, atrophy or abnormal enhancement.  Recommend clinical correlation and further evaluation with CSF. Recommend 3-month follow-up contrast-enhanced MRI.\"  - Folate lower end of normal, 6.2  - Vitamin D low, 17.4  - Labs WNL: Vitamin B12, RPR, Lyme total AB with reflex    Plan:  - Plan for repeat LP on 10/25 via IR  Plan to hold Eliquis   CSF labs to be obtained: RBC, WBC, total protein, glucose, gram stain, culture, MS panel, Anti MOG Antibody CSF (miscellaneous study), paraneoplastic panel   - Hold off on initiating IV steroids at this time, will obtain LP first   - Pending: Paraneoplastic profile serum study  - Serum studies pending:   NMO, Anti MOG antibody serum (miscellaneous study), copper, Sjogren's, vitamin B1  - On Eliquis for thromboembolism/PE; plan to hold for LP  - Consider folate supplementation   - Monitor neuro exam; notify with any changes  - Medical management and supportive care per ICU team. Correction of any metabolic or infectious disturbances.   "

## 2024-10-24 NOTE — OCCUPATIONAL THERAPY NOTE
Occupational Therapy Cancellation  Note     Patient Name: Hemanth Swanson  Today's Date: 10/24/2024     10/24/24 3291   Note Type   Note type Cancelled Session   Cancel Reasons Medical status   Additional Comments OT consult received and chart reviewed. [facility] resident Trenton Pearce @13:28 regarding patients medical appropriateness for participation in therapy and to establish therapy's role. Will hold and continue to follow, awaiting clarification.     Merced Forte MS OTR/L   [place] Licensure# [id number]

## 2024-10-24 NOTE — PHYSICAL THERAPY NOTE
PHYSICAL THERAPY CANCELLATION NOTE          Patient Name: Hemanth Swanson  Today's Date: 10/24/2024             10/24/24 1323   Note Type   Note type Cancelled Session   Additional Comments PT w/ active PT eval and tx orders in chart. Contacted Resident Dr. Trenton Pearce via Secure Chat to clarify pt's medical appropriateness for participation in PT eval and mobility. Will hold at this time pending clarification. PT will continue to follow pt during current hospital admission.         Mile Maciel, PT, DPT  10/24/24

## 2024-10-24 NOTE — PLAN OF CARE
Problem: Prexisting or High Potential for Compromised Skin Integrity  Goal: Skin integrity is maintained or improved  Description: INTERVENTIONS:  - Identify patients at risk for skin breakdown  - Assess and monitor skin integrity  - Assess and monitor nutrition and hydration status  - Monitor labs   - Assess for incontinence   - Turn and reposition patient  - Assist with mobility/ambulation  - Relieve pressure over bony prominences  - Avoid friction and shearing  - Provide appropriate hygiene as needed including keeping skin clean and dry  - Evaluate need for skin moisturizer/barrier cream  - Collaborate with interdisciplinary team   - Patient/family teaching  - Consider wound care consult   Outcome: Progressing     Problem: PAIN - ADULT  Goal: Verbalizes/displays adequate comfort level or baseline comfort level  Description: Interventions:  - Encourage patient to monitor pain and request assistance  - Assess pain using appropriate pain scale  - Administer analgesics based on type and severity of pain and evaluate response  - Implement non-pharmacological measures as appropriate and evaluate response  - Consider cultural and social influences on pain and pain management  - Notify physician/advanced practitioner if interventions unsuccessful or patient reports new pain  Outcome: Progressing     Problem: INFECTION - ADULT  Goal: Absence or prevention of progression during hospitalization  Description: INTERVENTIONS:  - Assess and monitor for signs and symptoms of infection  - Monitor lab/diagnostic results  - Monitor all insertion sites, i.e. indwelling lines, tubes, and drains  - Monitor endotracheal if appropriate and nasal secretions for changes in amount and color  - [place] appropriate cooling/warming therapies per order  - Administer medications as ordered  - Instruct and encourage patient and family to use good hand hygiene technique  - Identify and instruct in appropriate isolation precautions for  identified infection/condition  Outcome: Progressing  Goal: Absence of fever/infection during neutropenic period  Description: INTERVENTIONS:  - Monitor WBC    Outcome: Progressing     Problem: SAFETY ADULT  Goal: Patient will remain free of falls  Description: INTERVENTIONS:  - Educate patient/family on patient safety including physical limitations  - Instruct patient to call for assistance with activity   - Consult OT/PT to assist with strengthening/mobility   - Keep Call bell within reach  - Keep bed low and locked with side rails adjusted as appropriate  - Keep care items and personal belongings within reach  - Initiate and maintain comfort rounds  - Make Fall Risk Sign visible to staff  - Offer Toileting every 2 Hours, in advance of need  - Initiate/Maintain bed alarm  - Obtain necessary fall risk management equipment  - Apply yellow socks and bracelet for high fall risk patients  - Consider moving patient to room near nurses station  Outcome: Progressing  Goal: Maintain or return to baseline ADL function  Description: INTERVENTIONS:  -  Assess patient's ability to carry out ADLs; assess patient's baseline for ADL function and identify physical deficits which impact ability to perform ADLs (bathing, care of mouth/teeth, toileting, grooming, dressing, etc.)  - Assess/evaluate cause of self-care deficits   - Assess range of motion  - Assess patient's mobility; develop plan if impaired  - Assess patient's need for assistive devices and provide as appropriate  - Encourage maximum independence but intervene and supervise when necessary  - Involve family in performance of ADLs  - Assess for home care needs following discharge   - Consider OT consult to assist with ADL evaluation and planning for discharge  - Provide patient education as appropriate  Outcome: Progressing  Goal: Maintains/Returns to pre admission functional level  Description: INTERVENTIONS:  - Perform AM-PAC 6 Click Basic Mobility/ Daily Activity  assessment daily.  - Set and communicate daily mobility goal to care team and patient/family/caregiver.   - Collaborate with rehabilitation services on mobility goals if consulted  - Perform Range of Motion 3 times a day.  - Reposition patient every 2 hours.  - Dangle patient 3 times a day  - Stand patient 3 times a day  - Ambulate patient 3 times a day  - Out of bed to chair 3 times a day   - Out of bed for meals 3 times a day  - Out of bed for toileting  - Record patient progress and toleration of activity level   Outcome: Progressing     Problem: DISCHARGE PLANNING  Goal: Discharge to home or other facility with appropriate resources  Description: INTERVENTIONS:  - Identify barriers to discharge w/patient and caregiver  - Arrange for needed discharge resources and transportation as appropriate  - Identify discharge learning needs (meds, wound care, etc.)  - Arrange for interpretive services to assist at discharge as needed  - Refer to Case Management Department for coordinating discharge planning if the patient needs post-hospital services based on physician/advanced practitioner order or complex needs related to functional status, cognitive ability, or social support system  Outcome: Progressing     Problem: Knowledge Deficit  Goal: Patient/family/caregiver demonstrates understanding of disease process, treatment plan, medications, and discharge instructions  Description: Complete learning assessment and assess knowledge base.  Interventions:  - Provide teaching at level of understanding  - Provide teaching via preferred learning methods  Outcome: Progressing     Problem: SAFETY,RESTRAINT: NV/NON-SELF DESTRUCTIVE BEHAVIOR  Goal: Remains free of harm/injury (restraint for non violent/non self-detsructive behavior)  Description: INTERVENTIONS:  - Instruct patient/family regarding restraint use   - Assess and monitor physiologic and psychological status   - Provide interventions and comfort measures to meet  assessed patient needs   - Identify and implement measures to help patient regain control  - Assess readiness for release of restraint   Outcome: Progressing  Goal: Returns to optimal restraint-free functioning  Description: INTERVENTIONS:  - Assess the patient's behavior and symptoms that indicate continued need for restraint  - Identify and implement measures to help patient regain control  - Assess readiness for release of restraint   Outcome: Progressing     Problem: Nutrition/Hydration-ADULT  Goal: Nutrient/Hydration intake appropriate for improving, restoring or maintaining nutritional needs  Description: Monitor and assess patient's nutrition/hydration status for malnutrition. Collaborate with interdisciplinary team and initiate plan and interventions as ordered.  Monitor patient's weight and dietary intake as ordered or per policy. Utilize nutrition screening tool and intervene as necessary. Determine patient's food preferences and provide high-protein, high-caloric foods as appropriate.     INTERVENTIONS:  - Monitor oral intake, urinary output, labs, and treatment plans  - Assess nutrition and hydration status and recommend course of action  - Evaluate amount of meals eaten  - Assist patient with eating if necessary   - Allow adequate time for meals  - Recommend/ encourage appropriate diets, oral nutritional supplements, and vitamin/mineral supplements  - Order, calculate, and assess calorie counts as needed  - Recommend, monitor, and adjust tube feedings and TPN/PPN based on assessed needs  - Assess need for intravenous fluids  - Provide specific nutrition/hydration education as appropriate  - Include patient/family/caregiver in decisions related to nutrition  Outcome: Progressing

## 2024-10-24 NOTE — PLAN OF CARE
Problem: Prexisting or High Potential for Compromised Skin Integrity  Goal: Skin integrity is maintained or improved  Description: INTERVENTIONS:  - Identify patients at risk for skin breakdown  - Assess and monitor skin integrity  - Assess and monitor nutrition and hydration status  - Monitor labs   - Assess for incontinence   - Turn and reposition patient  - Assist with mobility/ambulation  - Relieve pressure over bony prominences  - Avoid friction and shearing  - Provide appropriate hygiene as needed including keeping skin clean and dry  - Evaluate need for skin moisturizer/barrier cream  - Collaborate with interdisciplinary team   - Patient/family teaching  - Consider wound care consult   Outcome: Progressing     Problem: SAFETY ADULT  Goal: Patient will remain free of falls  Description: INTERVENTIONS:  - Educate patient/family on patient safety including physical limitations  - Instruct patient to call for assistance with activity   - Consult OT/PT to assist with strengthening/mobility   - Keep Call bell within reach  - Keep bed low and locked with side rails adjusted as appropriate  - Keep care items and personal belongings within reach  - Initiate and maintain comfort rounds  - Make Fall Risk Sign visible to staff  - Apply yellow socks and bracelet for high fall risk patients  - Consider moving patient to room near nurses station  Outcome: Progressing  Goal: Maintain or return to baseline ADL function  Description: INTERVENTIONS:  -  Assess patient's ability to carry out ADLs; assess patient's baseline for ADL function and identify physical deficits which impact ability to perform ADLs (bathing, care of mouth/teeth, toileting, grooming, dressing, etc.)  - Assess/evaluate cause of self-care deficits   - Assess range of motion  - Assess patient's mobility; develop plan if impaired  - Assess patient's need for assistive devices and provide as appropriate  - Encourage maximum independence but intervene and  supervise when necessary  - Involve family in performance of ADLs  - Assess for home care needs following discharge   - Consider OT consult to assist with ADL evaluation and planning for discharge  - Provide patient education as appropriate  Outcome: Progressing  Goal: Maintains/Returns to pre admission functional level  Description: INTERVENTIONS:  - Perform AM-PAC 6 Click Basic Mobility/ Daily Activity assessment daily.  - Set and communicate daily mobility goal to care team and patient/family/caregiver.   - Collaborate with rehabilitation services on mobility goals if consulted  - Out of bed for toileting  - Record patient progress and toleration of activity level   Outcome: Progressing

## 2024-10-24 NOTE — PROGRESS NOTES
"Progress Note - Neurology   Name: Hemanth Swanson 39 y.o. male I MRN: [id number]  Unit/Bed#: ICU 05 I Date of Admission: 10/5/2024   Date of Service: 10/24/2024 I Hospital Day: 19     Assessment & Plan  Abnormal MRI  Hemanth Swanson is a 39 y.o. 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 Patient initially presented to [facility] on 10/5/2024 after pulling out G-tube. He was noted to be tachycardic, hypoxic, and febrile with leukocytosis. PNA concern on chest imaging. He was subsequently transferred to [facility] ICU for further monitoring and management. Bronchoscopy cultures positive for Proteus and Pseudomonas.     Neurology initially consulted on 10/09 for seizure like activity in the setting of hypoxia/bradycardia/hypotension. Episodes were captured on video EEG and without correlates, thus felt to be \"vagal response and possibly convulsive syncope in the setting of hypoxia/bradycardia.  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 Unclear etiology at this time. Recommended repeat MRI in 1 month to re-evaluate findings.    Neurology asked to reevaluate the patient again on 10/23/2024 for abnormal MRI C-spine (10/23) findings which demonstrated long segment of abnormal signal in the cervical and upper thoracic spine concerning for transverse myelitis.    Workup:  - 10/9 CT head: \"No " "acute intracranial abnormality\"  - 10/10 vEEG:   \"This 18 hour continuous video-EEG recording is abnormal. Multiple events of rocking up and down in bed (as described above) and multiple events of left arm shaking, jaw tremor, at times with upward eye deviation, as well as one event of body stiffening were captured, all without EEG changes suggesting these events were not seizures. Intermittent left temporal delta activities suggest an underlying area of neuronal dysfunction. No electrographic seizures or interictal epileptiform discharges were seen.\"  - 10/16 MRI brain w wo:   \"Mildly restricted diffusion in the splenium of the corpus callosum and more subtly in the anterior cingulate gyri. Possible sequela of hypoxic ischemic injury, inflammatory or infectious process.\"  - 10/23 MRI C-spine w wo:  \"Long segment of abnormal signal in the cervical and upper thoracic spine favored to represent nonspecific transverse myelitis.  No cord expansion, atrophy or abnormal enhancement.  Recommend clinical correlation and further evaluation with CSF. Recommend 3-month follow-up contrast-enhanced MRI.\"  - Folate lower end of normal, 6.2  - Vitamin D low, 17.4  - Labs WNL: Vitamin B12, RPR, Lyme total AB with reflex    Plan:  - Plan for repeat LP on 10/25 via IR  Plan to hold Eliquis   CSF labs to be obtained: RBC, WBC, total protein, glucose, gram stain, culture, MS panel, Anti MOG Antibody CSF (miscellaneous study), paraneoplastic panel   - Hold off on initiating IV steroids at this time, will obtain LP first   - Pending: Paraneoplastic profile serum study  - Serum studies pending:   NMO, Anti MOG antibody serum (miscellaneous study), copper, Sjogren's, vitamin B1  - On Eliquis for thromboembolism/PE; plan to hold for LP  - Consider folate supplementation   - Monitor neuro exam; notify with any changes  - Medical management and supportive care per ICU team. Correction of any metabolic or infectious disturbances. "   Seizure-like activity (HCC)  - See above, status post video EEG this admission   - Captured events without EEG correlate; felt to be related to vagal response/convulsive syncope in the setting hypoxia/bradycardia/hypotension  - No need for AED initiation  Sepsis (HCC)  - Noted to be febrile, tachycardic, with elevated WBC/lactic acid on presentation  - Initial CXR: Complete opacification of the left hemithorax with mediastinal shift to the left indicating at least in part atelectasis.   - COVID/flu/RSV negative  - Blood cultures, UA negative  - Sputum culture positive for Proteus mirabilis, Pseudomonas aeruginosa  - Antibiotic management per primary team  - Medical management per ICU  Chronic respiratory failure with hypoxia and hypercapnia (HCC)  - Tracheostomy with vent dependence  - Episodes of hypoxia; patient tampering with trach balloon  - Underwent trach exchange via ENT  - Medical management per ICU  Bradycardia  - Noted with HR dropping as low as 30-40s  - Received atropine on 10/8 for episode of arrest/pause and on 10/9 for bradycardia  - Cardiology following; appreciate recommendations  - No recommendation for PPM at this time  - Suspected to be in the setting of vasovagal/agitation  - Telemetry  - Medical management per ICU and Cardiology    Neurology follow-up:  Hemanth Swanson will need follow-up in in 4 weeks with neuromuscular team for Other in 60 minute appointment. They will not require outpatient neurological testing.     Subjective   Today patient shakes his head no when asked if he is in pain    Review of Systems  12 point ROS limited to nonverbal status    Objective :  Temp:  [97.5 °F (36.4 °C)-98.8 °F (37.1 °C)] 98.8 °F (37.1 °C)  HR:  [66-87] 80  BP: ()/(58-95) 122/77  Resp:  [18-22] 22  SpO2:  [91 %-98 %] 96 %  O2 Device: Ventilator    Physical Exam  Vitals and nursing note reviewed.   Constitutional:       General: He is not in acute distress.     Appearance: He is ill-appearing  and diaphoretic.   HENT:      Head: Normocephalic and atraumatic.   Eyes:      General: No scleral icterus.        Right eye: No discharge.         Left eye: No discharge.      Extraocular Movements: Extraocular movements intact.      Conjunctiva/sclera: Conjunctivae normal.   Musculoskeletal:      Comments: In BUE restraints   Skin:     General: Skin is warm.      Coloration: Skin is not jaundiced or pale.   Neurological:      Mental Status: He is alert.       Neurological Exam  Mental Status  Alert.  Patient is alert, lying in bed, status post trach  Nonverbal throughout encounter  Communicating by nodding head yes and shaking head no  Able to let us set appropriately when asked about his name  Incorrectly shakes his head no when asked if he is in the hospital  Able to follow central and appendicular commands.    Cranial Nerves  CN III, IV, VI: Extraocular movements intact bilaterally.  Primary gaze midline, conjugate gaze noted  Horizontal EOMs intact  Right end gaze nystagmus noted  Blink to threat intact bilaterally  No obvious lower facial weakness, however limited as patient has appeared  Tongue midline, no lacerations.    Motor    Bilateral  5/5    Right biceps and triceps 2/5  Left biceps 2/5, left triceps 4+/5    Right deltoid 2/5  Left deltoid 2/5    Bilateral hip flexion 5/5    Left dorsiflexion and plantarflexion 5/5    Right dorsiflexion 4 -/5, plantarflexion 5/5.    Sensory  Sensory exam limited as patient is nonverbal  Appears to wince with pinprick in all extremities.    Reflexes  Left toe downgoing, right toe equivocal    No rhythmic seizure-like activity noted throughout exam.    Coordination    Unable to assess coordination in BUE due to proximal weakness.      Lab Results: I have personally reviewed pertinent reports.  Recent Results (from the past 24 hour(s))   Vitamin B12    Collection Time: 10/23/24  4:35 PM   Result Value Ref Range    Vitamin B-12 630 180 - 914 pg/mL   Folate     Collection Time: 10/23/24  4:35 PM   Result Value Ref Range    Folate 6.2 >5.9 ng/mL   RPR-Syphilis Screening (Total Syphilis IGG/IGM)    Collection Time: 10/23/24  4:35 PM   Result Value Ref Range    Syphilis Total Antibody Non-reactive Non-Reactive   Lyme Total AB W Reflex to IGM/IGG    Collection Time: 10/23/24  4:35 PM   Result Value Ref Range    Lyme Total Antibodies Negative Negative   Vitamin D 25 hydroxy    Collection Time: 10/23/24  4:35 PM   Result Value Ref Range    Vit D, 25-Hydroxy 17.4 (L) 30.0 - 100.0 ng/mL   CBC    Collection Time: 10/24/24  4:39 AM   Result Value Ref Range    WBC 7.42 4.31 - 10.16 Thousand/uL    RBC 3.18 (L) 3.88 - 5.62 Million/uL    Hemoglobin 9.7 (L) 12.0 - 17.0 g/dL    Hematocrit 31.2 (L) 36.5 - 49.3 %    MCV 98 82 - 98 fL    MCH 30.5 26.8 - 34.3 pg    MCHC 31.1 (L) 31.4 - 37.4 g/dL    RDW 18.6 (H) 11.6 - 15.1 %    Platelets 195 149 - 390 Thousands/uL    MPV 10.1 8.9 - 12.7 fL   Basic metabolic panel    Collection Time: 10/24/24  4:39 AM   Result Value Ref Range    Sodium 136 135 - 147 mmol/L    Potassium 4.0 3.5 - 5.3 mmol/L    Chloride 96 96 - 108 mmol/L    CO2 37 (H) 21 - 32 mmol/L    ANION GAP 3 (L) 4 - 13 mmol/L    BUN 11 5 - 25 mg/dL    Creatinine 0.40 (L) 0.60 - 1.30 mg/dL    Glucose 108 65 - 140 mg/dL    Calcium 8.6 8.4 - 10.2 mg/dL    eGFR 151 ml/min/1.73sq m     Imaging Studies: I have personally reviewed pertinent reports and I have personally reviewed pertinent films in PACS.    EKG, Pathology, and Other Studies: I have personally reviewed pertinent reports.    VTE Pharmacologic Prophylaxis: Eliquis, held for potential LP today    Dictation voice to text software has been used in the creation of this document.  Please consider this in light of any contextual or grammatical errors.

## 2024-10-24 NOTE — PROGRESS NOTES
Progress Note - Critical Care/ICU   Name: Hemanth Swanson 39 y.o. male I MRN: [id number]  Unit/Bed#: ICU 05 I Date of Admission: 10/5/2024   Date of Service: 10/24/2024 I Hospital Day: 19       Assessment & Plan  Toxic metabolic encephalopathy  Intermittent periods of agitation, restless however alert oriented x 4 without loss of consciousness over the past few days  MRI on 10/16 with hypoxic brain injury findings  MRI C-Spine on 10/23: Findings favor nonspecific transverse myelitis; no sign of acuity due to lack of cord expansion. Unable to rule out ramifications from prior ischemia.  Goals of care discussions were currently continues with disease treatment palliative care has been follow now signed off as goals of care are clear.  Neurology following recommendations to repeat paraneoplastic workup and an MRI in about a month  [id number]: Culture/stain 2+ poly, 2+ gram-positive rods, 1+ gram-negative rods growing.    Plan  Continue monitor mental status  Per family patient is at baseline events has been persistent over the past year  Regards to restless agitation bearing-down has responded to gabapentin now at 300 TID and Ativan as needed; episodes have reduced intensity   Continue monitoring metabolic derangement  Completed 10-day course of antibiotics for Pseudomonas and Proteus mirabilis pneumonia  Monitor off of antibiotics   Touched base with neurology after MRI C-Spine: Transverse myelitis w/u ordered: Serum NMO IgG autoantibodies, copper level, surgeons antibodies, vitamin B12, folate, vitamin B1, RPR, Lyme, vitamin D 25.  LP scheduled for 10/25 via IR  Chronic respiratory failure with hypoxia and hypercapnia (HCC)  Neuromuscular disease post chemotherapyS/P tracheostomy in 9/2023.  He continues to fail SBT likely in the setting of diaphragmatic weakness in the setting of progressive demyelinating/ neuromuscular disease  Goals of care discussions continues disease driven  X-ray showed worsening  opacities 10/21  Multiple bronchoscopies resulted thick mucus secretions  Sputum/bronc culture showing growth of Pseudomonas (s/p 10 days treatment for Pseudomonas/Proteus)    Plan:  Continue vent support  Xopnex and hypertonic saline nebs 3 times daily  Re-started chest therapy (previously halted due to concern they were precipitating vasovagal episodes)  Consider bronchoscopy for symptomatic relief as needed    Bradycardia  Intermittent bradycardia not related to hypoxia.   He has been maintaining heart rates above 60 since medication adjustments.  Seroquel discontinue  Heart rate between episodes fluctuates from low 80s high 90s.  Continues to experience desaturations/bradycardic episodes likely vasovagal in nature  Removed scheduled benzodiazepines and opioids.  Episodes continue but have decreased in intensity     Plan  -Currently treating with Ativan as needed and manual bagging/suction   -Increased gabapentin to 300 mg 3 times daily  Sepsis (HCC)  Sepsis likely secondary to multifocal pneumonia in the setting of Proteus and Pseudomonas day 10 on antibiotics today for completion.   Now shock undifferentiated consider hypovolemic in the setting of insensible losses, echocardiogram has been remained unremarkable doubt sepsis at this time given adequate treatment on antibiotics for 10 days  Blood pressure remained with maps above 70 overnight.  He did not require pressors.  Will consider POCUS disease continuing evaluate IVC urine output has slowed down  Plan  If pressures are low, will trial of 500 cc Isolyte with monitoring urinary output.   Seizure-like activity (HCC)  No evidence of seizure on EMU on multiple admissions including current one  Seizure-like activity is likely stereotypical behaviors likely related to agitation; responding well to Ativan as needed  Umbilical hernia without obstruction and without gangrene  Evaluated by surgery.  Found to not be a surgical candidate due to ongoing  comorbidities.  Fat containing incarcerated hernia without bowel.  Palliative care by specialist  Palliative care is following.  Had long discussion with family about goals of care.  They would like to continue all measures.  The are willing to care for him at home.  They reiterated their wishes that he would not like to be sent to a rehab facility.  Goals of care, counseling/discussion  Goals of care discussions multiple admissions including current 1 palliative care follow-up continues with disease driven treatments  Discussed with mother, sister patient unfortunately continues with poor prognosis  Patient has 24/7 support at home  Case management following assistance upon discharge  Abnormal MRI  Noted with abnormal MRI concerning for hypoxic injury  Neurology on board recommendations to repeat paraneoplastic workup repeat MRI in about a month  Following a discussion with PM&R will complete MRI C-Spine with and without contrast for further evaluation.   Disposition: Critical care    ICU Core Measures     Vented Patient  VAP Bundle  VAP bundle ordered     A: Assess, Prevent, and Manage Pain Has pain been assessed? Yes  Need for changes to pain regimen? No   B: Both Spontaneous Awakening Trials (SATs) and Spontaneous Breathing Trials (SBTs) Plan to perform spontaneous awakening trial today? No secondary to demyelinating disorder resulting in vent dependence  Plan to perform spontaneous breathing trial today? No secondary to vent dependence secondary to DM disease  Obvious barriers to extubation? Yes   C: Choice of Sedation RASS Goal: 0 Alert and Calm  Need for changes to sedation or analgesia regimen? No   D: Delirium CAM-ICU: Negative   E: Early Mobility  Plan for early mobility? Yes   F: Family Engagement Plan for family engagement today? Yes       Review of Invasive Devices:      Central access plan: Patient has multiple central venous catheters.       Prophylaxis:  VTE VTE covered by:  [Held by provider]  apixaban, Per PEG Tube, 5 mg at 10/23/24 0801       Stress Ulcer  not ordered         24 Hour Events : Required a few instances of manual bagging due to vasovagal episodes. Furthermore, needed adjustments of trach collar due to low tidal volumes.   Subjective  Patient is alert, awake, following commands and calm. He denies pain.       Objective :                   Vitals I/O      Most Recent Min/Max in 24hrs   Temp 97.9 °F (36.6 °C) Temp  Min: 96.4 °F (35.8 °C)  Max: 97.9 °F (36.6 °C)   Pulse 80 Pulse  Min: 64  Max: 90   Resp 18 Resp  Min: 18  Max: 18   /81 BP  Min: 99/58  Max: 147/95   O2 Sat 94 % SpO2  Min: 91 %  Max: 98 %      Intake/Output Summary (Last 24 hours) at 10/24/2024 0706  Last data filed at 10/24/2024 0600  Gross per 24 hour   Intake 2709.25 ml   Output 226 ml   Net 2483.25 ml       Diet Enteral/Parenteral; Tube Feeding No Oral Diet; Jevity 1.5; Continuous; 70; 200; Water; Every 4 hours    Invasive Monitoring           Physical Exam   Physical Exam  Vitals reviewed.   Eyes:      General: Vision grossly intact. No foreign body in eyeNo scleral icterus.        Right eye: No discharge.         Left eye: No discharge.      Extraocular Movements: Extraocular movements intact.      Conjunctiva/sclera: Conjunctivae normal.   Skin:     Findings: Wound present.   HENT:      Head: Normocephalic and atraumatic.      Nose: No congestion.   Neck:      Trachea: Tracheostomy present.   Cardiovascular:      Rate and Rhythm: Normal rate and regular rhythm.   Musculoskeletal:         General: No swelling.      Right lower leg: Trace Edema present.      Left lower leg: Trace Edema present.   Abdominal: General: There is no distension.      Palpations: Abdomen is soft.      Tenderness: There is no abdominal tenderness. There is no guarding.      Hernia: A hernia is present.   Constitutional:       General: He is not in acute distress.     Appearance: He is well-developed. He is not ill-appearing.       Interventions: He is intubated.   Pulmonary:      Effort: Pulmonary effort is normal. He is intubated.      Breath sounds: Rhonchi present.   Neurological:      Mental Status: He is alert.          Diagnostic Studies        Lab Results: I have reviewed the following results:     Medications:  Scheduled PRN   [Held by provider] apixaban, 5 mg, BID  chlorhexidine, 15 mL, Q12H ISAEL  gabapentin, 300 mg, Q8H  guaiFENesin, 400 mg, Q6H  levalbuterol, 1.25 mg, Q6H  melatonin, 3 mg, HS  midodrine, 5 mg, Q8H  sodium chloride, 4 mL, Q6H      acetaminophen, 650 mg, Q6H PRN  bisacodyl, 10 mg, Daily PRN  fentaNYL, 50 mcg, Q15 Min PRN  LORazepam, 2 mg, Q4H PRN  oxyCODONE, 5 mg, Q6H PRN       Continuous          Labs:   CBC    Recent Labs     10/23/24  0636 10/24/24  0439   WBC 9.18 7.42   HGB 11.2* 9.7*   HCT 36.0* 31.2*    195     BMP    Recent Labs     10/23/24  0636 10/24/24  0439   SODIUM 137 136   K 4.2 4.0   CL 97 96   CO2 34* 37*   AGAP 6 3*   BUN 12 11   CREATININE 0.45* 0.40*   CALCIUM 8.9 8.6       Coags    No recent results     Additional Electrolytes  Recent Labs     10/23/24  0636   MG 2.0   PHOS 3.1   CAIONIZED 1.16          Blood Gas    No recent results  No recent results LFTs  No recent results    Infectious  No recent results  Glucose  Recent Labs     10/23/24  0636 10/24/24  0439   GLUC 101 108

## 2024-10-24 NOTE — ASSESSMENT & PLAN NOTE
Intermittent periods of agitation, restless however alert oriented x 4 without loss of consciousness over the past few days  MRI on 10/16 with hypoxic brain injury findings  MRI C-Spine on 10/23: Findings favor nonspecific transverse myelitis; no sign of acuity due to lack of cord expansion. Unable to rule out ramifications from prior ischemia.  Goals of care discussions were currently continues with disease treatment palliative care has been follow now signed off as goals of care are clear.  Neurology following recommendations to repeat paraneoplastic workup and an MRI in about a month  [id number]/21: Culture/stain 2+ poly, 2+ gram-positive rods, 1+ gram-negative rods growing.    Plan  Continue monitor mental status  Per family patient is at baseline events has been persistent over the past year  Regards to restless agitation bearing-down has responded to gabapentin now at 300 TID and Ativan as needed; episodes have reduced intensity   Continue monitoring metabolic derangement  Completed 10-day course of antibiotics for Pseudomonas and Proteus mirabilis pneumonia  Monitor off of antibiotics   Touched base with neurology after MRI C-Spine: Transverse myelitis w/u ordered: Serum NMO IgG autoantibodies, copper level, surgeons antibodies, vitamin B12, folate, vitamin B1, RPR, Lyme, vitamin D 25.  LP scheduled for 10/25 via IR

## 2024-10-25 ENCOUNTER — APPOINTMENT (INPATIENT)
Dept: GASTROENTEROLOGY | Facility: HOSPITAL | Age: 39
DRG: 720 | End: 2024-10-25
Attending: STUDENT IN AN ORGANIZED HEALTH CARE EDUCATION/TRAINING PROGRAM
Payer: COMMERCIAL

## 2024-10-25 ENCOUNTER — APPOINTMENT (INPATIENT)
Dept: RADIOLOGY | Facility: HOSPITAL | Age: 39
DRG: 720 | End: 2024-10-25
Attending: RADIOLOGY
Payer: COMMERCIAL

## 2024-10-25 PROBLEM — R21 RASH OF BACK: Status: ACTIVE | Noted: 2024-10-25

## 2024-10-25 LAB
ANION GAP SERPL CALCULATED.3IONS-SCNC: 3 MMOL/L (ref 4–13)
APPEARANCE CSF: NORMAL
BUN SERPL-MCNC: 11 MG/DL (ref 5–25)
C GATTII+NEOFOR DNA CSF QL NAA+NON-PROBE: NOT DETECTED
CA-I BLD-SCNC: 1.13 MMOL/L (ref 1.12–1.32)
CALCIUM SERPL-MCNC: 8.7 MG/DL (ref 8.4–10.2)
CHLORIDE SERPL-SCNC: 94 MMOL/L (ref 96–108)
CMV DNA CSF QL NAA+NON-PROBE: NOT DETECTED
CO2 SERPL-SCNC: 37 MMOL/L (ref 21–32)
COPPER SERPL-MCNC: 96 UG/DL (ref 69–132)
CREAT SERPL-MCNC: 0.47 MG/DL (ref 0.6–1.3)
E COLI K1 DNA CSF QL NAA+NON-PROBE: NOT DETECTED
ERYTHROCYTE [DISTWIDTH] IN BLOOD BY AUTOMATED COUNT: 18.4 % (ref 11.6–15.1)
EV RNA CSF QL NAA+NON-PROBE: NOT DETECTED
GFR SERPL CREATININE-BSD FRML MDRD: 142 ML/MIN/1.73SQ M
GLUCOSE CSF-MCNC: 54 MG/DL (ref 40–70)
GLUCOSE SERPL-MCNC: 83 MG/DL (ref 65–140)
GP B STREP DNA CSF QL NAA+NON-PROBE: NOT DETECTED
GRAM STN SPEC: NORMAL
HAEM INFLU DNA CSF QL NAA+NON-PROBE: NOT DETECTED
HCT VFR BLD AUTO: 31.8 % (ref 36.5–49.3)
HGB BLD-MCNC: 9.8 G/DL (ref 12–17)
HHV6 DNA CSF QL NAA+NON-PROBE: NOT DETECTED
HSV1 DNA CSF QL NAA+NON-PROBE: NOT DETECTED
HSV2 DNA CSF QL NAA+NON-PROBE: NOT DETECTED
L MONOCYTOG DNA CSF QL NAA+NON-PROBE: NOT DETECTED
LYMPHOCYTES NFR CSF MANUAL: 100 %
MAGNESIUM SERPL-MCNC: 1.8 MG/DL (ref 1.9–2.7)
MCH RBC QN AUTO: 30.1 PG (ref 26.8–34.3)
MCHC RBC AUTO-ENTMCNC: 30.8 G/DL (ref 31.4–37.4)
MCV RBC AUTO: 98 FL (ref 82–98)
N MEN DNA CSF QL NAA+NON-PROBE: NOT DETECTED
PARECHOVIRUS A RNA CSF QL NAA+NON-PROBE: NOT DETECTED
PHOSPHATE SERPL-MCNC: 3.3 MG/DL (ref 2.7–4.5)
PLATELET # BLD AUTO: 209 THOUSANDS/UL (ref 149–390)
PMV BLD AUTO: 10.4 FL (ref 8.9–12.7)
POTASSIUM SERPL-SCNC: 4 MMOL/L (ref 3.5–5.3)
PROT CSF-MCNC: 32 MG/DL (ref 15–45)
RBC # BLD AUTO: 3.26 MILLION/UL (ref 3.88–5.62)
RBC # CSF MANUAL: 0 UL (ref 0–10)
S PNEUM DNA CSF QL NAA+NON-PROBE: NOT DETECTED
SODIUM SERPL-SCNC: 134 MMOL/L (ref 135–147)
TOTAL CELLS COUNTED BLD: NO
TOTAL CELLS COUNTED SPEC: 5
TUBE # CSF: 4
VZV DNA CSF QL NAA+NON-PROBE: NOT DETECTED
WBC # BLD AUTO: 9.31 THOUSAND/UL (ref 4.31–10.16)
WBC # CSF AUTO: 1 /UL (ref 0–5)

## 2024-10-25 PROCEDURE — 94003 VENT MGMT INPAT SUBQ DAY: CPT

## 2024-10-25 PROCEDURE — 94640 AIRWAY INHALATION TREATMENT: CPT

## 2024-10-25 PROCEDURE — 94669 MECHANICAL CHEST WALL OSCILL: CPT

## 2024-10-25 PROCEDURE — 0B938ZX DRAINAGE OF RIGHT MAIN BRONCHUS, VIA NATURAL OR ARTIFICIAL OPENING ENDOSCOPIC, DIAGNOSTIC: ICD-10-PCS | Performed by: STUDENT IN AN ORGANIZED HEALTH CARE EDUCATION/TRAINING PROGRAM

## 2024-10-25 PROCEDURE — 83735 ASSAY OF MAGNESIUM: CPT | Performed by: NURSE PRACTITIONER

## 2024-10-25 PROCEDURE — 31622 DX BRONCHOSCOPE/WASH: CPT | Performed by: STUDENT IN AN ORGANIZED HEALTH CARE EDUCATION/TRAINING PROGRAM

## 2024-10-25 PROCEDURE — 82784 ASSAY IGA/IGD/IGG/IGM EACH: CPT

## 2024-10-25 PROCEDURE — 86341 ISLET CELL ANTIBODY: CPT

## 2024-10-25 PROCEDURE — 84157 ASSAY OF PROTEIN OTHER: CPT

## 2024-10-25 PROCEDURE — 89051 BODY FLUID CELL COUNT: CPT

## 2024-10-25 PROCEDURE — 94668 MNPJ CHEST WALL SBSQ: CPT

## 2024-10-25 PROCEDURE — 84100 ASSAY OF PHOSPHORUS: CPT | Performed by: NURSE PRACTITIONER

## 2024-10-25 PROCEDURE — 94150 VITAL CAPACITY TEST: CPT

## 2024-10-25 PROCEDURE — 86362 MOG-IGG1 ANTB CBA EACH: CPT | Performed by: PHYSICIAN ASSISTANT

## 2024-10-25 PROCEDURE — 83916 OLIGOCLONAL BANDS: CPT

## 2024-10-25 PROCEDURE — 82042 OTHER SOURCE ALBUMIN QUAN EA: CPT

## 2024-10-25 PROCEDURE — 99291 CRITICAL CARE FIRST HOUR: CPT | Performed by: STUDENT IN AN ORGANIZED HEALTH CARE EDUCATION/TRAINING PROGRAM

## 2024-10-25 PROCEDURE — 99233 SBSQ HOSP IP/OBS HIGH 50: CPT | Performed by: PSYCHIATRY & NEUROLOGY

## 2024-10-25 PROCEDURE — 87483 CNS DNA AMP PROBE TYPE 12-25: CPT

## 2024-10-25 PROCEDURE — 87107 FUNGI IDENTIFICATION MOLD: CPT

## 2024-10-25 PROCEDURE — 82040 ASSAY OF SERUM ALBUMIN: CPT

## 2024-10-25 PROCEDURE — 83873 ASSAY OF CSF PROTEIN: CPT

## 2024-10-25 PROCEDURE — 62270 DX LMBR SPI PNXR: CPT

## 2024-10-25 PROCEDURE — 009U3ZX DRAINAGE OF SPINAL CANAL, PERCUTANEOUS APPROACH, DIAGNOSTIC: ICD-10-PCS | Performed by: STUDENT IN AN ORGANIZED HEALTH CARE EDUCATION/TRAINING PROGRAM

## 2024-10-25 PROCEDURE — 82330 ASSAY OF CALCIUM: CPT | Performed by: NURSE PRACTITIONER

## 2024-10-25 PROCEDURE — 86255 FLUORESCENT ANTIBODY SCREEN: CPT

## 2024-10-25 PROCEDURE — 94760 N-INVAS EAR/PLS OXIMETRY 1: CPT

## 2024-10-25 PROCEDURE — 82945 GLUCOSE OTHER FLUID: CPT

## 2024-10-25 PROCEDURE — 62328 DX LMBR SPI PNXR W/FLUOR/CT: CPT | Performed by: STUDENT IN AN ORGANIZED HEALTH CARE EDUCATION/TRAINING PROGRAM

## 2024-10-25 PROCEDURE — 80048 BASIC METABOLIC PNL TOTAL CA: CPT | Performed by: NURSE PRACTITIONER

## 2024-10-25 PROCEDURE — 77003 FLUOROGUIDE FOR SPINE INJECT: CPT

## 2024-10-25 PROCEDURE — 89050 BODY FLUID CELL COUNT: CPT

## 2024-10-25 PROCEDURE — 85027 COMPLETE CBC AUTOMATED: CPT | Performed by: NURSE PRACTITIONER

## 2024-10-25 PROCEDURE — 86051 AQUAPORIN-4 ANTB ELISA: CPT

## 2024-10-25 PROCEDURE — 0B978ZX DRAINAGE OF LEFT MAIN BRONCHUS, VIA NATURAL OR ARTIFICIAL OPENING ENDOSCOPIC, DIAGNOSTIC: ICD-10-PCS | Performed by: STUDENT IN AN ORGANIZED HEALTH CARE EDUCATION/TRAINING PROGRAM

## 2024-10-25 PROCEDURE — 87070 CULTURE OTHR SPECIMN AEROBIC: CPT

## 2024-10-25 PROCEDURE — 86362 MOG-IGG1 ANTB CBA EACH: CPT

## 2024-10-25 PROCEDURE — 97163 PT EVAL HIGH COMPLEX 45 MIN: CPT

## 2024-10-25 RX ORDER — LIDOCAINE WITH 8.4% SOD BICARB 0.9%(10ML)
SYRINGE (ML) INJECTION AS NEEDED
Status: COMPLETED | OUTPATIENT
Start: 2024-10-25 | End: 2024-10-25

## 2024-10-25 RX ORDER — SODIUM CHLORIDE, SODIUM LACTATE, POTASSIUM CHLORIDE, CALCIUM CHLORIDE 600; 310; 30; 20 MG/100ML; MG/100ML; MG/100ML; MG/100ML
125 INJECTION, SOLUTION INTRAVENOUS CONTINUOUS
Status: DISCONTINUED | OUTPATIENT
Start: 2024-10-25 | End: 2024-10-25

## 2024-10-25 RX ORDER — MAGNESIUM SULFATE HEPTAHYDRATE 40 MG/ML
2 INJECTION, SOLUTION INTRAVENOUS ONCE
Status: COMPLETED | OUTPATIENT
Start: 2024-10-25 | End: 2024-10-25

## 2024-10-25 RX ORDER — EPINEPHRINE 1 MG/ML
INJECTION, SOLUTION, CONCENTRATE INTRAVENOUS
Status: DISPENSED
Start: 2024-10-25 | End: 2024-10-25

## 2024-10-25 RX ORDER — LIDOCAINE HYDROCHLORIDE 10 MG/ML
INJECTION, SOLUTION EPIDURAL; INFILTRATION; INTRACAUDAL; PERINEURAL
Status: DISPENSED
Start: 2024-10-25 | End: 2024-10-25

## 2024-10-25 RX ADMIN — LEVALBUTEROL HYDROCHLORIDE 1.25 MG: 1.25 SOLUTION RESPIRATORY (INHALATION) at 14:08

## 2024-10-25 RX ADMIN — SODIUM CHLORIDE, SODIUM LACTATE, POTASSIUM CHLORIDE, AND CALCIUM CHLORIDE 125 ML/HR: .6; .31; .03; .02 INJECTION, SOLUTION INTRAVENOUS at 07:30

## 2024-10-25 RX ADMIN — GABAPENTIN 300 MG: 250 SOLUTION ORAL at 16:57

## 2024-10-25 RX ADMIN — SODIUM CHLORIDE 1000 MG: 0.9 INJECTION, SOLUTION INTRAVENOUS at 15:45

## 2024-10-25 RX ADMIN — Medication 3 MG: at 21:02

## 2024-10-25 RX ADMIN — CHLORHEXIDINE GLUCONATE 15 ML: 1.2 RINSE ORAL at 09:09

## 2024-10-25 RX ADMIN — LEVALBUTEROL HYDROCHLORIDE 1.25 MG: 1.25 SOLUTION RESPIRATORY (INHALATION) at 01:04

## 2024-10-25 RX ADMIN — LEVALBUTEROL HYDROCHLORIDE 1.25 MG: 1.25 SOLUTION RESPIRATORY (INHALATION) at 07:27

## 2024-10-25 RX ADMIN — CLOBETASOL PROPIONATE: 0.5 CREAM TOPICAL at 09:09

## 2024-10-25 RX ADMIN — Medication 37.5 G: at 18:28

## 2024-10-25 RX ADMIN — GUAIFENESIN 400 MG: 200 SOLUTION ORAL at 23:09

## 2024-10-25 RX ADMIN — GUAIFENESIN 400 MG: 200 SOLUTION ORAL at 16:57

## 2024-10-25 RX ADMIN — LORAZEPAM 2 MG: 2 INJECTION INTRAMUSCULAR; INTRAVENOUS at 17:35

## 2024-10-25 RX ADMIN — GUAIFENESIN 400 MG: 200 SOLUTION ORAL at 05:21

## 2024-10-25 RX ADMIN — MIDODRINE HYDROCHLORIDE 5 MG: 5 TABLET ORAL at 00:22

## 2024-10-25 RX ADMIN — Medication 10 ML: at 10:30

## 2024-10-25 RX ADMIN — MIDODRINE HYDROCHLORIDE 5 MG: 5 TABLET ORAL at 09:09

## 2024-10-25 RX ADMIN — CLOBETASOL PROPIONATE: 0.5 CREAM TOPICAL at 17:51

## 2024-10-25 RX ADMIN — FENTANYL CITRATE 50 MCG: 50 INJECTION INTRAMUSCULAR; INTRAVENOUS at 22:55

## 2024-10-25 RX ADMIN — CHLORHEXIDINE GLUCONATE 15 ML: 1.2 RINSE ORAL at 20:58

## 2024-10-25 RX ADMIN — MAGNESIUM SULFATE HEPTAHYDRATE 2 G: 40 INJECTION, SOLUTION INTRAVENOUS at 06:20

## 2024-10-25 RX ADMIN — LEVALBUTEROL HYDROCHLORIDE 1.25 MG: 1.25 SOLUTION RESPIRATORY (INHALATION) at 19:40

## 2024-10-25 RX ADMIN — GUAIFENESIN 400 MG: 200 SOLUTION ORAL at 11:10

## 2024-10-25 RX ADMIN — GABAPENTIN 300 MG: 250 SOLUTION ORAL at 07:38

## 2024-10-25 RX ADMIN — LORAZEPAM 2 MG: 2 INJECTION INTRAMUSCULAR; INTRAVENOUS at 23:28

## 2024-10-25 NOTE — ASSESSMENT & PLAN NOTE
"Hemanth Swanson is a 39 y.o. 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 Patient initially presented to [facility] on 10/5/2024 after pulling out G-tube. He was noted to be tachycardic, hypoxic, and febrile with leukocytosis. PNA concern on chest imaging. He was subsequently transferred to [facility] [place] [place] ICU for further monitoring and management. Bronchoscopy cultures positive for Proteus and Pseudomonas.     Neurology initially consulted on 10/09 for seizure like activity in the setting of hypoxia/bradycardia/hypotension. Episodes were captured on video EEG and without correlates, thus felt to be \"vagal response and possibly convulsive syncope in the setting of hypoxia/bradycardia.  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 Unclear etiology at this time. Recommended repeat MRI in 1 month to re-evaluate findings.    Neurology asked to reevaluate the patient again on 10/23/2024 for abnormal MRI C-spine (10/23) findings which demonstrated long segment of abnormal signal in the cervical and upper thoracic spine concerning for transverse myelitis.    Workup:  - 10/9 CT head: \"No acute intracranial abnormality\"  - 10/10 vEEG:   \"This 18 hour continuous video-EEG recording is abnormal. Multiple events of rocking up and down in bed (as described above) and multiple events of left arm shaking, jaw " "tremor, at times with upward eye deviation, as well as one event of body stiffening were captured, all without EEG changes suggesting these events were not seizures. Intermittent left temporal delta activities suggest an underlying area of neuronal dysfunction. No electrographic seizures or interictal epileptiform discharges were seen.\"  - 10/16 MRI brain w wo:   \"Mildly restricted diffusion in the splenium of the corpus callosum and more subtly in the anterior cingulate gyri. Possible sequela of hypoxic ischemic injury, inflammatory or infectious process.\"  - 10/23 MRI C-spine w wo:  \"Long segment of abnormal signal in the cervical and upper thoracic spine favored to represent nonspecific transverse myelitis.  No cord expansion, atrophy or abnormal enhancement.  Recommend clinical correlation and further evaluation with CSF. Recommend 3-month follow-up contrast-enhanced MRI.\"  - S/p LP on 10/25:  CSF results WNL: WBC, RBC, total protein, glucose Gram stain  - Folate lower end of normal, 6.2  - Vitamin D low, 17.4  - Labs WNL: Vitamin B12, RPR, Lyme total AB with reflex, Sjogren's    Etiology remains unclear.  CSF studies unremarkable for evidence of inflammation. Exam consistent with a more peripheral etiology, suspicious for CIDP. Prior EMG unremarkable, however patient may benefit from repeating EMG as CIDP is at times difficult to appreciate on EMG.  Recommendations made to initiate IVIG x 5 days.    Plan:  - CSF results pending:  Culture, MS panel, Anti MOG Antibody CSF (miscellaneous study), paraneoplastic panel, meningitis/encephalitis panel  - Recommend initiating IVIG 37.5 g daily x 5 days  - Consider repeating EMG in the outpatient setting  - Will defer decision to initiate Solu-Medrol to critical care team, no contraindication from a neurology standpoint  - Serum studies pending:   NMO, Anti MOG antibody serum (miscellaneous study), copper, vitamin B1,  Paraneoplastic profile serum study  - On Eliquis for " thromboembolism/PE  - Consider folate supplementation   - Monitor neuro exam; notify with any changes  - Medical management and supportive care per ICU team. Correction of any metabolic or infectious disturbances.

## 2024-10-25 NOTE — ASSESSMENT & PLAN NOTE
Intermittent periods of agitation, restless however alert oriented x 4 without loss of consciousness over the past few days  MRI on 10/16 with hypoxic brain injury findings  MRI C-Spine on 10/23: Findings favor nonspecific transverse myelitis; no sign of acuity due to lack of cord expansion. Unable to rule out ramifications from prior ischemia.  Goals of care discussions were currently continues with disease treatment palliative care has been follow now signed off as goals of care are clear.  Neurology following recommendations to repeat paraneoplastic workup and an MRI in about a month  [id number]: Culture/stain 2+ poly, 2+ gram-positive rods, 1+ gram-negative rods growing.    Plan  Continue monitor mental status  Per family patient is at baseline events has been persistent over the past year  Regards to restless agitation bearing-down has responded to gabapentin now at 300 TID and Ativan as needed; episodes have reduced intensity   Continue monitoring metabolic derangement  Completed 10-day course of antibiotics for Pseudomonas and Proteus mirabilis pneumonia  Monitor off of antibiotics   Touched base with neurology after MRI C-Spine: Transverse myelitis w/u ordered: Serum NMO IgG autoantibodies, copper level, surgeons antibodies, vitamin B12, folate, vitamin B1, RPR, Lyme, vitamin D 25.  LP scheduled for 10/25 via IR

## 2024-10-25 NOTE — ASSESSMENT & PLAN NOTE
"Hemanth Swanson is a 39 y.o. 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 Patient initially presented to [facility] on 10/5/2024 after pulling out G-tube. He was noted to be tachycardic, hypoxic, and febrile with leukocytosis. PNA concern on chest imaging. He was subsequently transferred to [facility] ICU for further monitoring and management. Bronchoscopy cultures positive for Proteus and Pseudomonas.     Neurology initially consulted on 10/09 for seizure like activity in the setting of hypoxia/bradycardia/hypotension. Episodes were captured on video EEG and without correlates, thus felt to be \"vagal response and possibly convulsive syncope in the setting of hypoxia/bradycardia.  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 Unclear etiology at this time. Recommended repeat MRI in 1 month to re-evaluate findings.    Neurology asked to reevaluate the patient again on 10/23/2024 for abnormal MRI C-spine (10/23) findings which demonstrated long segment of abnormal signal in the cervical and upper thoracic spine concerning for transverse myelitis.    Workup:  - 10/9 CT head: \"No acute intracranial abnormality\"  - 10/10 vEEG:   \"This 18 hour continuous video-EEG recording is abnormal. Multiple events of rocking up and down in bed (as described above) and multiple events of left arm shaking, jaw " "tremor, at times with upward eye deviation, as well as one event of body stiffening were captured, all without EEG changes suggesting these events were not seizures. Intermittent left temporal delta activities suggest an underlying area of neuronal dysfunction. No electrographic seizures or interictal epileptiform discharges were seen.\"  - 10/16 MRI brain w wo:   \"Mildly restricted diffusion in the splenium of the corpus callosum and more subtly in the anterior cingulate gyri. Possible sequela of hypoxic ischemic injury, inflammatory or infectious process.\"  - 10/23 MRI C-spine w wo:  \"Long segment of abnormal signal in the cervical and upper thoracic spine favored to represent nonspecific transverse myelitis.  No cord expansion, atrophy or abnormal enhancement.  Recommend clinical correlation and further evaluation with CSF. Recommend 3-month follow-up contrast-enhanced MRI.\"  - Folate lower end of normal, 6.2  - Vitamin D low, 17.4  - Labs WNL: Vitamin B12, RPR, Lyme total AB with reflex    Plan:  - Plan for repeat LP on 10/25 via IR  Plan to hold Eliquis   CSF labs to be obtained: RBC, WBC, total protein, glucose, gram stain, culture, MS panel, Anti MOG Antibody CSF (miscellaneous study), paraneoplastic panel   - Hold off on initiating IV steroids at this time, will obtain LP first   - Pending: Paraneoplastic profile serum study  - Serum studies pending:   NMO, Anti MOG antibody serum (miscellaneous study), copper, Sjogren's, vitamin B1  - On Eliquis for thromboembolism/PE; plan to hold for LP  - Consider folate supplementation   - Monitor neuro exam; notify with any changes  - Medical management and supportive care per ICU team. Correction of any metabolic or infectious disturbances.   "

## 2024-10-25 NOTE — PHYSICAL THERAPY NOTE
PHYSICAL THERAPY EVALUATION NOTE          Patient Name: Hemanth Swanson  Today's Date: 10/25/2024        AGE:   39 y.o.  Mrn:   [id number]  ADMIT DX:  Fever [R50.9]    Past Medical History:  Past Medical History:   Diagnosis Date    Anxiety     Cancer (HCC)     Depression     Hypernatremia 10/06/2024    Migration of percutaneous endoscopic gastrostomy (PEG) tube (HCC) 2023    Psychiatric disorder     bipolar       Past Surgical History:  Past Surgical History:   Procedure Laterality Date    IR BIOPSY LYMPH NODE  2023    IR GASTROSTOMY (G) TUBE CHECK/CHANGE/REINSERTION/UPSIZE  2024    IR GASTROSTOMY TUBE PLACEMENT  2023    IR LUMBAR PUNCTURE  2023    IR LUMBAR PUNCTURE  10/25/2024    IR PORT PLACEMENT  2023    ORCHIECTOMY Left 2022    Procedure: ORCHIECTOMY INGUINAL;  Surgeon: Flip Michael MD;  Location:  MAIN OR;  Service: Urology    PEG W/TRACHEOSTOMY PLACEMENT N/A 2023    Procedure: TRACHEOSTOMY WITH INSERTION PEG TUBE;  Surgeon: Rudy Mcmanus MD;  Location: [facility];  Service: General    TESTICLE SURGERY       Length Of Stay: 20        PHYSICAL THERAPY EVALUATION:    Patient's identity confirmed via 2 patient identifiers (full name and ) at start of session       10/25/24 1414   PT Last Visit   PT Visit Date 10/25/24   Note Type   Note type Evaluation   Pain Assessment   Pain Assessment Tool FLACC   Pain Location/Orientation Orientation: Left;Location: Knee   Pain Onset/Description Frequency: Intermittent  (during L knee ROM/strength testing)   Hospital Pain Intervention(s) Repositioned   Multiple Pain Sites No  (pt declined pain throughout remainder of eval)   Pain Rating: FLACC (Rest) - Face 0   Pain Rating: FLACC (Rest) - Legs 0   Pain Rating: FLACC (Rest) - Activity 0   Pain Rating: FLACC (Rest) - Cry 0   Pain Rating: FLACC (Rest) - Consolability 0   Score: FLACC (Rest) 0   Pain Rating:  FLACC (Activity) - Face 1   Pain Rating: FLACC (Activity) - Legs 0   Pain Rating: FLACC (Activity) - Activity 0   Pain Rating: FLACC (Activity) - Cry 0   Pain Rating: FLACC (Activity) - Consolability 1   Score: FLACC (Activity) 2   Restrictions/Precautions   Weight Bearing Precautions Per Order No   Other Precautions Cognitive;Chair Alarm;Bed Alarm;Multiple lines;Fall Risk;Pain;Restraints  (trach+vent (chronic), catheter, bilateral UE soft restraints, G tube)   Home Living   Type of Home House   Home Layout Two level;Performs ADLs on one level;Able to live on main level with bedroom/bathroom;Ramped entrance   Home Equipment Wheelchair-manual;Hospital bed;Mechanical lift  (nicky lift, ventilator)   Additional Comments Home set-up and PLOF obtained from [facility] review   Prior Function   Level of [place] Needs assistance with functional mobility;Needs assistance with ADLs;Needs assistance with IADLS   Lives With Family  (sister, aunt, cousin, extended family)   Receives Help From Family  (paid caregivers 20hrs/day)   IADLs Family/Friend/Other provides transportation;Family/Friend/Other provides meals;Family/Friend/Other provides medication management   Comments Per CM notes PTA pt was requiring Ax2+ to perform transfers OOB to/from WC   General   Additional Pertinent History Contacted by Resident Dr. Jus Melendez to evaluate pt. Spoke to Resident and Attending Dr. Barksdale for clarification - asking for PT eval to evaluate pt's strength, functional capabilities, ROM prior to initiation of IVIG in order to assess the treatment's effects. Confirmed w/ ICU team and RN Ivory pt appropriate for strength and ROM testing prior to eval. Respiratory Therapist Reyna present at bedside throughout. Spoke to Resident and RN post eval   Family/Caregiver Present No   Cognition   Overall Cognitive Status Impaired   Arousal/Participation Cooperative   Orientation Level Oriented to person  (pt responds to first name)   Memory Unable  to assess   Following Commands Follows one step commands with increased time or repetition  (pt able to follow ~70-80% of 1 step verbal commands)   Comments Pt ID via name and ; pt non-verbal (trach+vent), able to mouth yes/no vs shaking/nodding head to answer simple questions   RUE Assessment   RUE Assessment   ( strength ~3+/5 w/ repeated VC, digit extension 3-/5. pt reported intact sensation to BUE)   RUE Strength   R Elbow Flexion 0/5   R Elbow Extension 0/5   R Forearm Supination 2-/5   LUE Assessment   LUE Assessment   ( strength ~3+/5 w/ repeated VC, digit extension 3-/5. pt reported intact sensation to BUE)   LUE Strength   L Elbow Flexion 3/5   L Elbow Extension 3/5   L Forearm Supination 3-/5   RLE Assessment   RLE Assessment X   Strength RLE   R Hip Flexion 2+/5   R Hip ABduction 2-/5   R Hip ADduction 2-/5   R Knee Flexion 2+/5   R Knee Extension 2/5   R Ankle Dorsiflexion 2/5   R Ankle Plantar Flexion 3+/5   LLE Assessment   LLE Assessment X   Strength LLE   L Hip Flexion 2-/5   L Hip ABduction 2-/5   L Hip ADduction 2-/5   L Knee Flexion 2/5   L Knee Extension 2/5  (w/in available ROM, - 10-15*)   L Ankle Dorsiflexion 2/5   L Ankle Plantar Flexion 3/5   Light Touch   RLE Light Touch Grossly intact  (pt reports intact sensation to heel, shin, anterior thigh)   LLE Light Touch Grossly intact  (pt reports intact sensation to heel, shin, anterior thigh)   Bed Mobility   Additional Comments did not assess bed mobiltiy at this time, spoke to RN who reports pt frequently has desatting episodes when turning/repositioning requiring to be manually bagged   Activity Tolerance   Activity Tolerance Other (Comment)  (pt limited by cognition, medical status)   Medical Staff Made Aware Attending Dr. Barksdale, Resident Dr. Melendez, CM Diane, RT Reyna   Nurse Made Aware RN Ivory   Assessment   Prognosis Guarded   Problem List Decreased strength;Decreased range of motion;Decreased cognition;Pain;Decreased  mobility   Assessment Hemanth Swanson is a 39 y.o. Male who presents to [facility] (from [facility]) on 10/5/24 due to feeding tube problem and diagnosis of toxic metabolic encephalopathy. Orders for PT eval and treat received. Comorbidities affecting pt's functional mobility at time of evaluation include: anxiety, ventilator dependency, impaired mobility, cardiac arrest, CA, bipolar disorder. Personal factors affecting DC include: inability to navigate level surfaces w/o external assistance, decreased cognition, inability to perform IADLs, and inability to perform ADLs. PTA pt was requiring Ax2+ to transfer OOB to/from a . Upon evaluation, pt presents w/ the following deficits: impaired strength, limited ROM, impaired cognition, and pain affecting mobility. Pt's clinical presentation is unstable/unpredictable due to abnormal lab values, pain affecting mobility tolerance, need for input for mobility technique, need for input for task focus, recent drastic decline in mobility status, ongoing medical management. From a PT/mobility standpoint given the above findings, DC recommendation is level: II (Moderate Rehab Resource Intensity). During current admission, pt will benefit from continued skilled inpatient PT in the acute care setting in order to address the above deficits and to maximize function and mobility prior to DC from acute care.   Goals   STG Expiration Date 11/08/24   Short Term Goal #1 Pt will: increase BLE strength to at least 3+/5 to increase pt's ability to reposition; tolerate at least 45* HOB in chair position for 30 min to demonstrate pt tolerance to an upright sitting position; perform bilateral rolling bed mobility w/ max Ax1 to decrease pt's burden of care. PT to see for progression of mobility when appropriate   PT Treatment Day 0   Plan   Treatment/Interventions LE strengthening/ROM;Therapeutic exercise;Endurance training;Bed mobility   PT Frequency 1-2x/wk   Discharge Recommendation   Rehab Resource  Intensity Level, PT II (Moderate Resource Intensity)   AM-PAC Basic Mobility Inpatient   Turning in Flat Bed Without Bedrails 1   Lying on Back to Sitting on Edge of Flat Bed Without Bedrails 1   Moving Bed to Chair 1   Standing Up From Chair Using Arms 1   Walk in Room 1   Climb 3-5 Stairs With Railing 1   Basic Mobility Inpatient Raw Score 6   Turning Head Towards Sound 1   Follow Simple Instructions 3   Low Function Basic Mobility Raw Score  10   Low Function Basic Mobility Standardized Score  14.65   [facility] Level Of Mobility   -[facility] Goal 2: Bed activities/Dependent transfer   -[facility] Achieved 2: Bed activities/Dependent transfer     At end of session, pt remained supine w/ bilateral UE restraints rand, RN and Resident updated post        The patient's AM-PAC Basic Mobility Inpatient Short Form Raw Score is 6. A Raw score of less than or equal to 16 suggests the patient may benefit from discharge to post-acute rehabilitation services. Please also refer to the recommendation of the Physical Therapist for safe discharge planning.    Pt will benefit from skilled inpatient PT during this admission in order to facilitate progress towards goals and to maximize functional independence prior to DC      DC rec: level II (Moderate Rehab Resource Intensity)        Mile Maciel, PT, DPT  10/25/24

## 2024-10-25 NOTE — SEDATION DOCUMENTATION
Procedure completed by Dr Ohara, RT at bedside for airway management. OP >43 mmH2O, 27cc clear CSF removed, CP 17 mmH2O. Transported back to ICU by IR staff and RT, bedside report given.

## 2024-10-25 NOTE — ASSESSMENT & PLAN NOTE
Noted with abnormal MRI concerning for hypoxic injury  Neurology on board recommendations to repeat paraneoplastic workup repeat MRI in about a month  MRI-C Spine: Transverse myelitis not acute no cord expansion versus sequelae from old ischemia.  LP for further evaluation.

## 2024-10-25 NOTE — RESPIRATORY THERAPY NOTE
Pt desatted to 13% Bagged pt until spo2 improved. Placed back on vent on settings 14/500/50%/+8

## 2024-10-25 NOTE — BRIEF OP NOTE (RAD/CATH)
INTERVENTIONAL RADIOLOGY PROCEDURE NOTE    Date: 10/25/2024    Procedure:   LP      Preoperative diagnosis:   1. Ventilator dependent (HCC)    2. Chronic respiratory failure with hypoxia and hypercapnia (HCC)    3. Hypernatremia    4. S/P percutaneous endoscopic gastrostomy (PEG) tube placement (HCC)    5. Bradycardia    6. Seizure-like activity (HCC)    7. Hernia, umbilical    8. Toxic metabolic encephalopathy    9. Tracheostomy malfunction (HCC)    10. Abnormal MRI    11. Cardiac arrest due to other underlying condition (HCC)    12. Mucus plugging of bronchi    13. 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HCC)    14. Rash         Postoperative diagnosis: Same.    Surgeon: Bhupinder Ohara MD     Assistant: None. No qualified resident was available.    Blood loss: 0 ml    Specimens: sent to lab     Findings:   LP L2-3 returned 30 mls clear csf.    OP >43 cm h20, markedly elevated.  CP 17 cm h20    Complications: None immediate.    Anesthesia: local

## 2024-10-25 NOTE — PROGRESS NOTES
"Progress Note - Neurology   Name: Hemanth Swanson 39 y.o. male I MRN: [id number]  Unit/Bed#: ICU 05 I Date of Admission: 10/5/2024   Date of Service: 10/25/2024 I Hospital Day: 20     Assessment & Plan  Abnormal MRI  Hemanth Swanson is a 39 y.o. 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 Patient initially presented to [facility] on 10/5/2024 after pulling out G-tube. He was noted to be tachycardic, hypoxic, and febrile with leukocytosis. PNA concern on chest imaging. He was subsequently transferred to [facility] ICU for further monitoring and management. Bronchoscopy cultures positive for Proteus and Pseudomonas.     Neurology initially consulted on 10/09 for seizure like activity in the setting of hypoxia/bradycardia/hypotension. Episodes were captured on video EEG and without correlates, thus felt to be \"vagal response and possibly convulsive syncope in the setting of hypoxia/bradycardia.  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 Unclear etiology at this time. Recommended repeat MRI in 1 month to re-evaluate findings.    Neurology asked to reevaluate the patient again on 10/23/2024 for abnormal MRI C-spine (10/23) findings which demonstrated long segment of abnormal signal in the cervical and upper thoracic spine concerning for transverse myelitis.    Workup:  - 10/9 CT head: \"No " "acute intracranial abnormality\"  - 10/10 vEEG:   \"This 18 hour continuous video-EEG recording is abnormal. Multiple events of rocking up and down in bed (as described above) and multiple events of left arm shaking, jaw tremor, at times with upward eye deviation, as well as one event of body stiffening were captured, all without EEG changes suggesting these events were not seizures. Intermittent left temporal delta activities suggest an underlying area of neuronal dysfunction. No electrographic seizures or interictal epileptiform discharges were seen.\"  - 10/16 MRI brain w wo:   \"Mildly restricted diffusion in the splenium of the corpus callosum and more subtly in the anterior cingulate gyri. Possible sequela of hypoxic ischemic injury, inflammatory or infectious process.\"  - 10/23 MRI C-spine w wo:  \"Long segment of abnormal signal in the cervical and upper thoracic spine favored to represent nonspecific transverse myelitis.  No cord expansion, atrophy or abnormal enhancement.  Recommend clinical correlation and further evaluation with CSF. Recommend 3-month follow-up contrast-enhanced MRI.\"  - S/p LP on 10/25:  CSF results WNL: WBC, RBC, total protein, glucose Gram stain  - Folate lower end of normal, 6.2  - Vitamin D low, 17.4  - Labs WNL: Vitamin B12, RPR, Lyme total AB with reflex, Sjogren's    Etiology remains unclear.  CSF studies unremarkable for evidence of inflammation. Exam consistent with a more peripheral etiology, suspicious for CIDP. Prior EMG unremarkable, however patient may benefit from repeating EMG as CIDP is at times difficult to appreciate on EMG.  Recommendations made to initiate IVIG x 5 days.    Plan:  - CSF results pending:  Culture, MS panel, Anti MOG Antibody CSF (miscellaneous study), paraneoplastic panel, meningitis/encephalitis panel  - Recommend initiating IVIG 37.5 g daily x 5 days  - Consider repeating EMG in the outpatient setting  - Will defer decision to initiate Solu-Medrol to " critical care team, no contraindication from a neurology standpoint  - Serum studies pending:   NMO, Anti MOG antibody serum (miscellaneous study), copper, vitamin B1,  Paraneoplastic profile serum study  - On Eliquis for thromboembolism/PE  - Consider folate supplementation   - Monitor neuro exam; notify with any changes  - Medical management and supportive care per ICU team. Correction of any metabolic or infectious disturbances.   Seizure-like activity (HCC)  - See above, status post video EEG this admission   - Captured events without EEG correlate; felt to be related to vagal response/convulsive syncope in the setting hypoxia/bradycardia/hypotension  - No need for AED initiation  Sepsis (HCC)  - Noted to be febrile, tachycardic, with elevated WBC/lactic acid on presentation  - Initial CXR: Complete opacification of the left hemithorax with mediastinal shift to the left indicating at least in part atelectasis.   - COVID/flu/RSV negative  - Blood cultures, UA negative  - Sputum culture positive for Proteus mirabilis, Pseudomonas aeruginosa  - Antibiotic management per primary team  - Medical management per ICU  Chronic respiratory failure with hypoxia and hypercapnia (HCC)  - Tracheostomy with vent dependence  - Episodes of hypoxia; patient tampering with trach balloon  - Underwent trach exchange via ENT  - Medical management per ICU  Bradycardia  - Noted with HR dropping as low as 30-40s  - Received atropine on 10/8 for episode of arrest/pause and on 10/9 for bradycardia  - Cardiology following; appreciate recommendations  - No recommendation for PPM at this time  - Suspected to be in the setting of vasovagal/agitation  - Telemetry  - Medical management per ICU and Cardiology    Neurology follow-up:  Hemanth Swanson will need follow-up in in 4 weeks with neuromuscular team for Other in 60 minute appointment. They will not require outpatient neurological testing.     Subjective   Patient shakes his head no when  asked if he is uncomfortable or has any pain.    Review of Systems  12 point ROS limited to nonverbal status    Objective :  Temp:  [99.2 °F (37.3 °C)-99.8 °F (37.7 °C)] 99.2 °F (37.3 °C)  HR:  [68-94] 76  BP: (101-153)/(57-95) 127/91  Resp:  [18] 18  SpO2:  [90 %-100 %] 97 %  O2 Device: Trach mask  FiO2 (%):  [40] 40    Physical Exam  Vitals and nursing note reviewed.   Constitutional:       General: He is not in acute distress.     Appearance: He is diaphoretic. He is not toxic-appearing.   HENT:      Head: Normocephalic and atraumatic.   Eyes:      General: No scleral icterus.        Right eye: No discharge.         Left eye: No discharge.      Extraocular Movements: Extraocular movements intact.      Conjunctiva/sclera: Conjunctivae normal.   Pulmonary:      Comments: Status post trach  Musculoskeletal:      Comments: In bilateral upper extremity restraints   Skin:     General: Skin is warm.      Coloration: Skin is not jaundiced or pale.   Neurological:      Mental Status: He is alert.       Neurological Exam  Mental Status  Alert.  Patient is alert, lying in bed, in bilateral upper extremity restraints  Nonverbal throughout encounter  Incorrectly shakes his head no when asked about his name, location, and current month  Able to follow central and appendicular commands.    Cranial Nerves  CN III, IV, VI: Extraocular movements intact bilaterally.  Primary gaze midline, conjugate gaze noted  Horizontal EOMs intact, no evidence of nystagmus  Limited upward gaze  Blink to threat intact bilaterally  No obvious facial asymmetry noted  Tongue midline, no lacerations.    Motor      Bilateral upper extremities in restraints    Bilateral  5/5  Left biceps and triceps 4+/5  Right bicep 1/5, tricep 4 -/5  Left deltoid 2/5, right deltoid 1/5    Bilateral hip flexion 5/5  Left dorsiflexion and plantarflexion 5/5  Right dorsiflexion 4/5, plantarflexion 5/5.    Sensory  Reports sensation to light touch intact in bilateral  upper and lower extremities via nodding head yes.    Reflexes  Left toe downgoing, right toe equivocal    No involuntary movements or rhythmic seizure-like activity noted throughout exam.    Coordination    Unable to assess coordination due to BUE proximal weakness.      Lab Results: I have personally reviewed pertinent reports.  Recent Results (from the past 24 hour(s))   CBC    Collection Time: 10/25/24  4:41 AM   Result Value Ref Range    WBC 9.31 4.31 - 10.16 Thousand/uL    RBC 3.26 (L) 3.88 - 5.62 Million/uL    Hemoglobin 9.8 (L) 12.0 - 17.0 g/dL    Hematocrit 31.8 (L) 36.5 - 49.3 %    MCV 98 82 - 98 fL    MCH 30.1 26.8 - 34.3 pg    MCHC 30.8 (L) 31.4 - 37.4 g/dL    RDW 18.4 (H) 11.6 - 15.1 %    Platelets 209 149 - 390 Thousands/uL    MPV 10.4 8.9 - 12.7 fL   Calcium, ionized    Collection Time: 10/25/24  4:41 AM   Result Value Ref Range    Calcium, Ionized 1.13 1.12 - 1.32 mmol/L   Basic metabolic panel    Collection Time: 10/25/24  4:41 AM   Result Value Ref Range    Sodium 134 (L) 135 - 147 mmol/L    Potassium 4.0 3.5 - 5.3 mmol/L    Chloride 94 (L) 96 - 108 mmol/L    CO2 37 (H) 21 - 32 mmol/L    ANION GAP 3 (L) 4 - 13 mmol/L    BUN 11 5 - 25 mg/dL    Creatinine 0.47 (L) 0.60 - 1.30 mg/dL    Glucose 83 65 - 140 mg/dL    Calcium 8.7 8.4 - 10.2 mg/dL    eGFR 142 ml/min/1.73sq m   Magnesium    Collection Time: 10/25/24  4:41 AM   Result Value Ref Range    Magnesium 1.8 (L) 1.9 - 2.7 mg/dL   Phosphorus    Collection Time: 10/25/24  4:41 AM   Result Value Ref Range    Phosphorus 3.3 2.7 - 4.5 mg/dL   CSF white cell count with differential    Collection Time: 10/25/24 10:41 AM   Result Value Ref Range    Appearance, CSF Clear, Colorless     Tube Number, CSF 4     WBC, CSF 1 0 - 5 /uL    Xanthochromia No No   RBC count,CSF    Collection Time: 10/25/24 10:41 AM   Result Value Ref Range    RBC, CSF 0 0 - 10 uL   Total Protein, CSF    Collection Time: 10/25/24 10:41 AM   Result Value Ref Range    Protein, CSF 32 15 -  45 mg/dL   Glucose, CSF    Collection Time: 10/25/24 10:41 AM   Result Value Ref Range    Glucose, CSF 54 40 - 70 mg/dL   CSF Diff    Collection Time: 10/25/24 10:41 AM   Result Value Ref Range    Total Counted 5     Lymphs %  %   STAT Gram Stain    Collection Time: 10/25/24 11:42 AM    Specimen: Other   Result Value Ref Range    Gram Stain Result No No organisms seen     Gram Stain Result 1+ Mononuclear Cells     Gram Stain Result No Polys      Imaging Studies: I have personally reviewed pertinent reports and I have personally reviewed pertinent films in PACS.    EKG, Pathology, and Other Studies: I have personally reviewed pertinent reports.    VTE Pharmacologic Prophylaxis: Eliquis, held for     Dictation voice to text software has been used in the creation of this document.  Please consider this in light of any contextual or grammatical errors.

## 2024-10-25 NOTE — PROGRESS NOTES
Progress Note - Critical Care/ICU   Name: Hemanth Swanson 39 y.o. male I MRN: [id number]  Unit/Bed#: ICU 05 I Date of Admission: 10/5/2024   Date of Service: 10/25/2024 I Hospital Day: 20       Assessment & Plan  Toxic metabolic encephalopathy  Intermittent periods of agitation, restless however alert oriented x 4 without loss of consciousness over the past few days  MRI on 10/16 with hypoxic brain injury findings  MRI C-Spine on 10/23: Findings favor nonspecific transverse myelitis; no sign of acuity due to lack of cord expansion. Unable to rule out ramifications from prior ischemia.  Goals of care discussions were currently continues with disease treatment palliative care has been follow now signed off as goals of care are clear.  Neurology following recommendations to repeat paraneoplastic workup and an MRI in about a month  [id number]: Culture/stain 2+ poly, 2+ gram-positive rods, 1+ gram-negative rods growing.    Plan  Continue monitor mental status  Per family patient is at baseline events has been persistent over the past year  Regards to restless agitation bearing-down has responded to gabapentin now at 300 TID and Ativan as needed; episodes have reduced intensity   Continue monitoring metabolic derangement  Completed 10-day course of antibiotics for Pseudomonas and Proteus mirabilis pneumonia  Monitor off of antibiotics   Touched base with neurology after MRI C-Spine: Transverse myelitis w/u ordered: Serum NMO IgG autoantibodies, copper level, surgeons antibodies, vitamin B12, folate, vitamin B1, RPR, Lyme, vitamin D 25.  LP scheduled for 10/25 via IR  Chronic respiratory failure with hypoxia and hypercapnia (HCC)  Neuromuscular disease post chemotherapyS/P tracheostomy in 9/2023.  He continues to fail SBT likely in the setting of diaphragmatic weakness in the setting of progressive demyelinating/ neuromuscular disease  Goals of care discussions continues disease driven  X-ray showed worsening  opacities 10/21  Multiple bronchoscopies resulted thick mucus secretions  Sputum/bronc culture showing growth of Pseudomonas (s/p 10 days treatment for Pseudomonas/Proteus)    Plan:  Continue vent support  Xopnex and hypertonic saline nebs 3 times daily  Re-started chest therapy (previously halted due to concern they were precipitating vasovagal episodes)  Consider bronchoscopy for symptomatic relief as needed    Bradycardia  Intermittent bradycardia not related to hypoxia.   He has been maintaining heart rates above 60 since medication adjustments.  Seroquel discontinue  Heart rate between episodes fluctuates from low 80s high 90s.  Continues to experience desaturations/bradycardic episodes likely vasovagal in nature  Removed scheduled benzodiazepines and opioids.  Episodes continue but have decreased in intensity     Plan  -Currently treating with Ativan as needed and manual bagging/suction   -Increased gabapentin to 300 mg 3 times daily  Sepsis (HCC)  Sepsis likely secondary to multifocal pneumonia in the setting of Proteus and Pseudomonas day 10 on antibiotics today for completion.   Now shock undifferentiated consider hypovolemic in the setting of insensible losses, echocardiogram has been remained unremarkable doubt sepsis at this time given adequate treatment on antibiotics for 10 days  Blood pressure remained with maps above 70 overnight.  He did not require pressors.  Will consider POCUS disease continuing evaluate IVC urine output has slowed down  Plan  If pressures are low, will trial of 500 cc Isolyte with monitoring urinary output.   Seizure-like activity (HCC)  No evidence of seizure on EMU on multiple admissions including current one  Seizure-like activity is likely stereotypical behaviors likely related to agitation; responding well to Ativan as needed  Umbilical hernia without obstruction and without gangrene  Evaluated by surgery.  Found to not be a surgical candidate due to ongoing  comorbidities.  Fat containing incarcerated hernia without bowel.  Palliative care by specialist  Palliative care is following.  Had long discussion with family about goals of care.  They would like to continue all measures.  The are willing to care for him at home.  They reiterated their wishes that he would not like to be sent to a rehab facility.  Goals of care, counseling/discussion  Goals of care discussions multiple admissions including current 1 palliative care follow-up continues with disease driven treatments  Discussed with mother, sister patient unfortunately continues with poor prognosis  Patient has 24/7 support at home  Case management following assistance upon discharge  Abnormal MRI  Noted with abnormal MRI concerning for hypoxic injury  Neurology on board recommendations to repeat paraneoplastic workup repeat MRI in about a month  MRI-C Spine: Transverse myelitis not acute no cord expansion versus sequelae from old ischemia.  LP for further evaluation.  Rash of back  Patient has a persistent rash on his left back.  Does not cross the midline but expands across several dermatomes.  Rash is papular/pustule and crusted in places.  Rash was first noted over a week ago on this admission but patient's sister endorses rash being present over the course of a year.  They have tried over-the-counter creams for relief with no significant improvement.    Plan  Dermatology consulted, on board  2 punch biopsies were obtained cervical left shoulder  Will follow-up results  Temovate applied twice daily for no more than 2 weeks  Disposition: Critical care    ICU Core Measures     Vented Patient  VAP Bundle  VAP bundle ordered     A: Assess, Prevent, and Manage Pain Has pain been assessed? Yes  Need for changes to pain regimen? No   B: Both Spontaneous Awakening Trials (SATs) and Spontaneous Breathing Trials (SBTs) Plan to perform spontaneous awakening trial today? N/A   Plan to perform spontaneous breathing trial  today? N/A   Obvious barriers to extubation? Yes   C: Choice of Sedation RASS Goal: 0 Alert and Calm  Need for changes to sedation or analgesia regimen? No   D: Delirium CAM-ICU: Negative   E: Early Mobility  Plan for early mobility? Yes   F: Family Engagement Plan for family engagement today? Yes       Review of Invasive Devices:      Central access plan: Medications requiring central line      Prophylaxis:  VTE VTE covered by:  [Held by provider] apixaban, Per PEG Tube, 5 mg at 10/23/24 0801       Stress Ulcer  not ordered         24 Hour Events : Patient had 1 episode of desaturating and bradycardia vasovagal in nature yesterday night.  He responded well to bagging.  Patient also has a looks like a contraction alkalosis likely secondary to being n.p.o. for LP procedures scheduled for today.  Will start gentle hydration and restart tube feeds once LP is completed.  Subjective  Patient is doing well today. Alert, following commands. He denies pain. Discussed LP scheduled for today with him.       Objective :                   Vitals I/O      Most Recent Min/Max in 24hrs   Temp 99.2 °F (37.3 °C) Temp  Min: 98.8 °F (37.1 °C)  Max: 99.8 °F (37.7 °C)   Pulse 68 Pulse  Min: 68  Max: 94   Resp 18 Resp  Min: 14  Max: 22   /57 BP  Min: 101/57  Max: 153/88   O2 Sat 95 % SpO2  Min: 90 %  Max: 97 %      Intake/Output Summary (Last 24 hours) at 10/25/2024 0736  Last data filed at 10/25/2024 0627  Gross per 24 hour   Intake 1370 ml   Output 574 ml   Net 796 ml       Diet NPO    Invasive Monitoring           Physical Exam   Physical Exam  Eyes:      General: No foreign body in eyeNo scleral icterus.        Right eye: No discharge.         Left eye: No discharge.      Extraocular Movements: Extraocular movements intact.      Conjunctiva/sclera: Conjunctivae normal.   Skin:     Findings: Rash present.   HENT:      Head: Normocephalic and atraumatic.      Nose: No congestion.   Neck:      Trachea: Tracheostomy present.    Cardiovascular:      Rate and Rhythm: Normal rate and regular rhythm.      Heart sounds: Normal heart sounds.   Musculoskeletal:         General: Swelling present.      Right lower leg: Trace Edema present.      Left lower leg: Trace Edema present.   Abdominal:      Palpations: Abdomen is soft.      Tenderness: There is no abdominal tenderness.   Constitutional:       Appearance: He is well-developed.      Interventions: He is intubated.   Pulmonary:      Effort: Pulmonary effort is normal. He is intubated.      Breath sounds: Rhonchi present.   Neurological:      Mental Status: He is alert. He is calm.      Cranial Nerves: No facial asymmetry.   Genitourinary/Anorectal:  external catheter present.        Diagnostic Studies        Lab Results: I have reviewed the following results:     Medications:  Scheduled PRN   [Held by provider] apixaban, 5 mg, BID  chlorhexidine, 15 mL, Q12H ISAEL  clobetasol, , BID  gabapentin, 300 mg, Q8H  guaiFENesin, 400 mg, Q6H  levalbuterol, 1.25 mg, Q6H  magnesium sulfate, 2 g, Once  melatonin, 3 mg, HS  midodrine, 5 mg, Q8H      acetaminophen, 650 mg, Q6H PRN  bisacodyl, 10 mg, Daily PRN  fentaNYL, 50 mcg, Q2H PRN  LORazepam, 2 mg, Q4H PRN  oxyCODONE, 5 mg, Q6H PRN       Continuous    lactated ringers, 125 mL/hr, Last Rate: 125 mL/hr (10/25/24 0730)         Labs:   CBC    Recent Labs     10/24/24  0439 10/25/24  0441   WBC 7.42 9.31   HGB 9.7* 9.8*   HCT 31.2* 31.8*    209     BMP    Recent Labs     10/24/24  0439 10/25/24  0441   SODIUM 136 134*   K 4.0 4.0   CL 96 94*   CO2 37* 37*   AGAP 3* 3*   BUN 11 11   CREATININE 0.40* 0.47*   CALCIUM 8.6 8.7       Coags    No recent results     Additional Electrolytes  Recent Labs     10/25/24  0441   MG 1.8*   PHOS 3.3   CAIONIZED 1.13          Blood Gas    No recent results  No recent results LFTs  No recent results    Infectious  No recent results  Glucose  Recent Labs     10/24/24  0439 10/25/24  0441   GLUC 108 83

## 2024-10-25 NOTE — PLAN OF CARE
Problem: PHYSICAL THERAPY ADULT  Goal: Performs mobility at highest level of function for planned discharge setting.  See evaluation for individualized goals.  Description: Treatment/Interventions: LE strengthening/ROM, Therapeutic exercise, Endurance training, Bed mobility          See flowsheet documentation for full assessment, interventions and recommendations.  10/25/2024 1645 by Mile Maciel PT  Note: Prognosis: Guarded  Problem List: Decreased strength, Decreased range of motion, Decreased cognition, Pain, Decreased mobility  Assessment: Hemanth Swanson is a 39 y.o. Male who presents to [facility] (from [facility]) on 10/5/24 due to feeding tube problem and diagnosis of toxic metabolic encephalopathy. Orders for PT eval and treat received. Comorbidities affecting pt's functional mobility at time of evaluation include: anxiety, ventilator dependency, impaired mobility, cardiac arrest, CA, bipolar disorder. Personal factors affecting DC include: inability to navigate level surfaces w/o external assistance, decreased cognition, inability to perform IADLs, and inability to perform ADLs. PTA pt was requiring Ax2+ to transfer OOB to/from a . Upon evaluation, pt presents w/ the following deficits: impaired strength, limited ROM, impaired cognition, and pain affecting mobility. Pt's clinical presentation is unstable/unpredictable due to abnormal lab values, pain affecting mobility tolerance, need for input for mobility technique, need for input for task focus, recent drastic decline in mobility status, ongoing medical management. From a PT/mobility standpoint given the above findings, DC recommendation is level: II (Moderate Rehab Resource Intensity). During current admission, pt will benefit from continued skilled inpatient PT in the acute care setting in order to address the above deficits and to maximize function and mobility prior to DC from acute care.        Rehab Resource Intensity Level, PT: II (Moderate Resource  Intensity)    See flowsheet documentation for full assessment.

## 2024-10-25 NOTE — RESPIRATORY THERAPY NOTE
RT Ventilator Management Note  Hemanth Swanson 39 y.o. male MRN: [id number]  Unit/Bed#: ICU 05 Encounter: [id number]       10/25/24 0728   Respiratory Assessment   Assessment Type Assess only   General Appearance Awake   Respiratory Pattern Assisted   Chest Assessment Chest expansion symmetrical   Bilateral Breath Sounds Coarse;Rhonchi   Suction Trach   O2 Device vent   Vent Information   Vent    Vent type     Vent Mode AC/VC   $ Pulse Oximetry Spot Check Charge Completed   SpO2 95 %   AC/VC Settings   Resp Rate (BPM) 14 BPM   Vt (mL) 500 mL   FIO2 (%) 40 %   PEEP (cmH2O) 8 cmH2O   Flow Pattern (LPM) 45 L/min   Trigger Sensitivity Flow (lpm) 3 %   Humidification Heater   Heater Temperature (Set) 98.6 °F (37 °C)   AC/VC Actuals   Resp Rate (BPM) 14 BPM   VT (mL) 515   MV 7.18   MAP (cmH2O) 15 cmH2O   Peak Pressure (cmH2O) 32 cmH2O   I/E Ratio (Obs) 1:2.5   Heater Temperature (Obs) 98.6 °F (37 °C)   Static Compliance (mL/cmH20) 29 mL/cmH2O   Plateau Pressure (cm H2O) 26 cm H2O   AC/VC Alarms   High Peak Pressure (cmH2O) 45   High Resp Rate (BPM) 40 BPM   High MV (L/min) 20 L/min   Low MV (L/min) 4 L/min   Vt High (mL) 900 mL   Vt Low (mL) 250 mL   AC/VC Apnea Settings   Resp Rate (BPM) 14 BPM   VT (mL) 500 mL   FIO2 (%) 100 %   Apnea Time (s) 20 S   Apnea Flow (L/min) 60 L/min   Maintenance   Alarm (pink) cable attached Yes   Resuscitation bag with peep valve at bedside Yes   Water bag changed No   Circuit changed No   Daily Screen   Patient safety screen outcome: Failed   Not Ready for Weaning due to: Underline problem not resolved   Surgical Airway Distal;Cuffed;Other (Comment)   Placement Date/Time: 06/01/24 1155   Tube Size: (c) 8  Placed By: Physician  Placed by (Name): Dr. Ronquillo  Type: Tracheostomy  Style: (c) Distal;Cuffed;Other (Comment)   Status Cuff Inflated;Secured   Site Assessment Clean;Dry   Site Care Dried;Cleansed;Dressing applied   Inner Cannula Care Changed/new   Ties Assessment  Clean;Secure   Surgical Airway Cuff Pressure (color) Green   Equipment at bedside BVM;Wall Suction setup;Additional complete same size trach tube;Additional complete one size smaller trach tube;Obturator;Sterile saline;Additional inner cannula       Daily Screen         10/24/2024  1127 10/25/2024  8106          Patient safety screen outcome:: Failed Failed      Not Ready for Weaning due to:: Underline problem not resolved Underline problem not resolved                Physical Exam:   Assessment Type: Assess only  General Appearance: Awake  Respiratory Pattern: Assisted  Chest Assessment: Chest expansion symmetrical  Bilateral Breath Sounds: Coarse, Rhonchi  Cough: None  Suction: Trach  O2 Device: vent      Resp Comments: Pt began to desat to low 40s, RT ambu bagged the PT

## 2024-10-25 NOTE — PLAN OF CARE
Problem: Prexisting or High Potential for Compromised Skin Integrity  Goal: Skin integrity is maintained or improved  Description: INTERVENTIONS:  - Identify patients at risk for skin breakdown  - Assess and monitor skin integrity  - Assess and monitor nutrition and hydration status  - Monitor labs   - Assess for incontinence   - Turn and reposition patient  - Assist with mobility/ambulation  - Relieve pressure over bony prominences  - Avoid friction and shearing  - Provide appropriate hygiene as needed including keeping skin clean and dry  - Evaluate need for skin moisturizer/barrier cream  - Collaborate with interdisciplinary team   - Patient/family teaching  - Consider wound care consult   Outcome: Progressing     Problem: PAIN - ADULT  Goal: Verbalizes/displays adequate comfort level or baseline comfort level  Description: Interventions:  - Encourage patient to monitor pain and request assistance  - Assess pain using appropriate pain scale  - Administer analgesics based on type and severity of pain and evaluate response  - Implement non-pharmacological measures as appropriate and evaluate response  - Consider cultural and social influences on pain and pain management  - Notify physician/advanced practitioner if interventions unsuccessful or patient reports new pain  Outcome: Progressing     Problem: INFECTION - ADULT  Goal: Absence or prevention of progression during hospitalization  Description: INTERVENTIONS:  - Assess and monitor for signs and symptoms of infection  - Monitor lab/diagnostic results  - Monitor all insertion sites, i.e. indwelling lines, tubes, and drains  - Monitor endotracheal if appropriate and nasal secretions for changes in amount and color  - [place] appropriate cooling/warming therapies per order  - Administer medications as ordered  - Instruct and encourage patient and family to use good hand hygiene technique  - Identify and instruct in appropriate isolation precautions for  identified infection/condition  Outcome: Progressing  Goal: Absence of fever/infection during neutropenic period  Description: INTERVENTIONS:  - Monitor WBC    Outcome: Progressing     Problem: SAFETY ADULT  Goal: Patient will remain free of falls  Description: INTERVENTIONS:  - Educate patient/family on patient safety including physical limitations  - Instruct patient to call for assistance with activity   - Consult OT/PT to assist with strengthening/mobility   - Keep Call bell within reach  - Keep bed low and locked with side rails adjusted as appropriate  - Keep care items and personal belongings within reach  - Initiate and maintain comfort rounds  - Make Fall Risk Sign visible to staff  - Apply yellow socks and bracelet for high fall risk patients  - Consider moving patient to room near nurses station  Outcome: Progressing  Goal: Maintain or return to baseline ADL function  Description: INTERVENTIONS:  -  Assess patient's ability to carry out ADLs; assess patient's baseline for ADL function and identify physical deficits which impact ability to perform ADLs (bathing, care of mouth/teeth, toileting, grooming, dressing, etc.)  - Assess/evaluate cause of self-care deficits   - Assess range of motion  - Assess patient's mobility; develop plan if impaired  - Assess patient's need for assistive devices and provide as appropriate  - Encourage maximum independence but intervene and supervise when necessary  - Involve family in performance of ADLs  - Assess for home care needs following discharge   - Consider OT consult to assist with ADL evaluation and planning for discharge  - Provide patient education as appropriate  Outcome: Progressing  Goal: Maintains/Returns to pre admission functional level  Description: INTERVENTIONS:  - Perform AM-PAC 6 Click Basic Mobility/ Daily Activity assessment daily.  - Set and communicate daily mobility goal to care team and patient/family/caregiver.   - Collaborate with rehabilitation  services on mobility goals if consulted  Problem: DISCHARGE PLANNING  Goal: Discharge to home or other facility with appropriate resources  Description: INTERVENTIONS:  - Identify barriers to discharge w/patient and caregiver  - Arrange for needed discharge resources and transportation as appropriate  - Identify discharge learning needs (meds, wound care, etc.)  - Arrange for interpretive services to assist at discharge as needed  - Refer to Case Management Department for coordinating discharge planning if the patient needs post-hospital services based on physician/advanced practitioner order or complex needs related to functional status, cognitive ability, or social support system  Outcome: Progressing     - Out of bed for toileting  - Record patient progress and toleration of activity level   Outcome: Progressing

## 2024-10-25 NOTE — PHYSICAL THERAPY NOTE
PHYSICAL THERAPY CANCELLATION NOTE          Patient Name: Hemanth Swanson  Today's Date: 10/25/2024             10/25/24 1025   Note Type   Note type Cancelled Session   Additional Comments PT orders received, chart review performed. Spoke to JEAN-PAUL Vora during ICU mobility rounds. At this time pt is not appropriate for PT/mobility, pt off floor at IR for lumbar puncture. PT will continue to follow pt as appropriate and as schedule allows.         Mile Maciel, PT, DPT  10/25/24

## 2024-10-25 NOTE — OCCUPATIONAL THERAPY NOTE
Occupational Therapy Cancellation Note        Patient Name: Hemanth Swanson  Today's Date: 10/25/2024     10/25/24 1020   Note Type   Note type Cancelled Session   Cancel Reasons Medical status   Additional Comments OT orders recieved, chart reviewed. per ICU mobility rounds, pt off floor at LP, not medically appropriate for therapy at this time. Will continue to follow and initiate therapy as appropriate     Malathi More, OT

## 2024-10-25 NOTE — ASSESSMENT & PLAN NOTE
Patient has a persistent rash on his left back.  Does not cross the midline but expands across several dermatomes.  Rash is papular/pustule and crusted in places.  Rash was first noted over a week ago on this admission but patient's sister endorses rash being present over the course of a year.  They have tried over-the-counter creams for relief with no significant improvement.    Plan  Dermatology consulted, on board  2 punch biopsies were obtained cervical left shoulder  Will follow-up results  Temovate applied twice daily for no more than 2 weeks

## 2024-10-25 NOTE — UTILIZATION REVIEW
"Continued Stay Review    Date: 10/25/24                          Current Patient Class: Inpatient  Current Level of Care: ICU    HPI:39 y.o. male initially admitted on  10/5/24 inpatient     \"admitted to medical ICU due to respiratory failure, complex past medical history includes ventilator dependent respiratory failure due to an unknown neurologic condition, ventilator dependence, history of seminoma status post orchiectomy and chemotherapy that may have led to his neurologic condition, status post trach, PEG, PE on indefinite anticoagulation.   During his hospitalization he was admitted initially due to respiratory failure found to have left-sided consolidation along with hypernatremia.  Bronchoscopy with Pseudomonas, Proteus treated with cefepime and Zosyn.  He has had several bronchoscopies during his hospitalization, still continues to have worsening white out on imaging.Hyubmucvldlt52/21: Culture/stain 2+ poly, 2+ gram-positive rods, 1+ gram-negative rods growing    Neurology consulted for possible seizure-like activity but they feel this is not seizures. Seizure-like activity is likely stereotypical behaviors likely related to agitation; responding well to Ativan as needed  Cardiology consulted due to vagal arrhythmias/bradycardia, no further management.MRI on 10/16 with hypoxic brain injury findings .    Bronchoscopy this week showed significant mucous plugs.  BAL showing Pseudomonas, will not treat as ventilator acquired pneumonia as this is now colonization at this point.  Discussed with PMNR obtain MRI\"     Assessment/Plan:     10/25/2024 .  Patient presents with  episode of desaturating and bradycardia,  vasovagal in nature overnight.  Responded to bagging.   Alert, following commands.  Agitation less since starting gabapentin and ativan as needed.   On exam  Rash- Erythematous papular/petechial rash on the left back with overlying desquamation and scale. .  Trache.   Generalized swelling.   Rhonchi.   " Heart rate between episodes fluctuates from low 80s high 90s.   Abnormal labs or imaging:  MRI-C Spine: Transverse myelitis not acute no cord expansion versus sequelae from old ischemia.  LP for further evaluation.   Diagnosis/Plan     Toxic metabolic encephalopathy/chronic respiratory failure with hypoxia and hypercapnia/intermittent bradycardia/Sepsis econdary to multifocal pneumonia in the setting of Proteus and Pseudomonas/Seizure like activity/Goals of care counseling.  after MRI C-Spine: Transverse myelitis w/u ordered: Serum NMO IgG autoantibodies, copper level, surgeons antibodies, vitamin B12, folate, vitamin B1, RPR, Lyme, vitamin D 25.   n.p.o. for LP procedures scheduled for today. Will start gentle hydration and restart tube feeds once LP is completed.  Eliquis on hold.  Status post punch biopsy of rash yesterday.  Monitor mental status.   Monitor off antibiotics.   Continue vent support .  Xopnex and hypertonic saline nebs 3 times daily .  Re-started chest therapy (previously halted due to concern they were precipitating vasovagal episodes).   Consider bronchoscopy for symptomatic relief as needed.  Family wants to continue all measures.        Medications:   Scheduled Medications:  [Held by provider] apixaban, 5 mg, Per PEG Tube, BID  chlorhexidine, 15 mL, Mouth/Throat, Q12H ISAEL  clobetasol, , Topical, BID  gabapentin, 300 mg, Per PEG Tube, Q8H  guaiFENesin, 400 mg, Per G Tube, Q6H  levalbuterol, 1.25 mg, Nebulization, Q6H  melatonin, 3 mg, Per PEG Tube, HS    magnesium sulfate 2 g/50 mL IVPB (premix) 2 g  Dose: 2 g  Freq: Once Route: IV  Last Dose: 2 g (10/25/24 0620)  Start: 10/25/24 0615 End: 10/25/24 0820      Continuous IV Infusions:  lactated ringers, 125 mL/hr, Intravenous, Continuous      PRN Meds: not used.   acetaminophen, 650 mg, Per PEG Tube, Q6H PRN  bisacodyl, 10 mg, Rectal, Daily PRN  fentaNYL, 50 mcg, Intravenous, Q2H PRN  LORazepam, 2 mg, Intravenous, Q4H PRN  oxyCODONE, 5 mg, Per PEG  Tube, Q6H PRN      Skin care plans:   1-Hydraguard to bilateral sacrum, buttock and heels BID and PRN   2-Elevate heels to offload pressure.   3-Ehob cushion in chair when out of bed.   4-Moisturize skin daily with skin nourishing cream.   5-Turn/reposition q2h or when medically stable for pressure re-distribution on skin.   6-Cleanse right hand with normal saline, apply normalgel to wound bed, cover with silicone bordered foam. Change daily and PRN soilage/displacement.     Discharge Plan:  to be determined      Vital Signs (last 3 days)       Date/Time Temp Pulse Resp BP MAP (mmHg) SpO2 FiO2 (%) O2 Device Patient Position - Orthostatic VS [place] Coma Scale Score Pain    10/25/24 10:32:32 -- 70 18 124/83 -- 98 % -- -- -- -- --    10/25/24 10:27:55 -- 82 18 138/95 -- 98 % -- -- -- -- --    10/25/24 0728 -- -- -- -- -- 95 % -- -- -- -- --    10/25/24 0700 99.2 °F (37.3 °C) 68 18 101/57 72 92 % -- Trach mask Lying -- --    10/25/24 0600 -- 77 -- 116/75 91 93 % -- -- -- -- --    10/25/24 0500 -- 79 -- 112/71 85 94 % -- -- -- -- --    10/25/24 0400 -- 82 -- 126/78 97 94 % -- -- -- 11 --    10/25/24 0330 -- -- -- -- -- -- 40 Ventilator -- -- --    10/25/24 0300 -- 82 -- 101/57 74 93 % -- -- -- -- --    10/25/24 0200 -- 94 -- 109/64 80 90 % -- -- -- -- --    10/25/24 0100 -- 82 -- 137/84 105 94 % -- -- -- -- --    10/25/24 0017 -- -- -- -- -- -- 40 Ventilator -- -- --    10/25/24 0000 -- 85 -- 110/65 81 94 % -- -- -- 11 --    10/24/24 2300 -- 81 -- 128/80 97 94 % -- -- -- -- --    10/24/24 2244 99.8 °F (37.7 °C) -- -- 141/88 109 -- -- -- Lying -- --    10/24/24 2201 -- 80 -- 104/64 77 93 % -- -- -- -- --    10/24/24 2100 -- 86 -- 120/76 92 91 % -- -- -- -- --    10/24/24 2000 -- 85 -- 124/76 93 97 % -- -- -- 11 No Pain    10/24/24 1951 -- -- -- -- -- -- 40 Ventilator -- -- --    10/24/24 1922 99.2 °F (37.3 °C) -- -- 133/83 103 -- -- -- Lying -- --    10/24/24 1800 -- 84 -- 121/76 93 97 % -- -- -- -- --    10/24/24 1700  -- 82 -- 153/88 114 97 % -- -- -- -- --    10/24/24 1600 -- 82 -- 102/58 73 95 % -- -- -- 11 --    10/24/24 1500 99.5 °F (37.5 °C) 89 14 117/81 94 94 % -- Trach mask Lying -- --    10/24/24 1200 -- -- -- -- -- -- -- -- -- 11 --    10/24/24 1127 -- -- -- -- -- 96 % -- -- -- -- --    10/24/24 1100 98.8 °F (37.1 °C) -- 22 122/77 94 -- -- -- Lying -- --    10/24/24 0907 -- 80 -- 130/88 106 94 % -- -- -- -- --    10/24/24 0807 -- 87 -- 120/76 93 93 % -- -- -- -- --    10/24/24 0800 -- -- -- -- -- -- -- -- -- 11 --    10/24/24 0720 98.8 °F (37.1 °C) -- 18 -- -- -- -- -- Lying -- --    10/24/24 0610 -- 80 -- 124/81 98 94 % -- -- -- -- --    10/24/24 0510 -- 81 -- 114/73 89 92 % -- -- -- -- --    10/24/24 0410 97.9 °F (36.6 °C) 80 18 105/60 78 91 % -- Ventilator Lying -- --    10/24/24 0400 -- -- -- -- -- -- -- -- -- 11 --    10/24/24 0110 -- 66 -- 107/65 80 95 % -- -- -- -- --    10/24/24 0010 -- 74 -- 101/59 75 96 % -- -- -- -- --    10/24/24 0000 -- -- -- -- -- -- -- -- -- 11 --    10/23/24 2312 -- 72 -- 99/58 72 97 % -- -- -- -- --    10/23/24 2000 -- -- -- -- -- -- -- -- -- 11 --    10/23/24 1922 97.8 °F (36.6 °C) 85 18 147/95 117 97 % -- Ventilator -- -- --    10/23/24 1705 -- 81 -- 142/90 112 98 % -- -- -- -- --    10/23/24 1615 -- 86 -- 143/92 113 98 % -- -- -- -- --    10/23/24 1600 -- -- -- -- -- -- -- -- -- 11 --    10/23/24 1500 97.5 °F (36.4 °C) -- 18 136/87 105 -- -- -- Lying -- --    10/23/24 1200 -- 64 -- -- -- 98 % -- -- -- 11 --    10/23/24 1100 96.4 °F (35.8 °C) 71 18 116/74 91 97 % -- -- Lying -- --    10/23/24 1000 -- 68 -- 119/70 91 97 % -- -- -- -- --    10/23/24 0900 -- 76 -- -- -- 97 % -- -- -- -- --    10/23/24 0812 97.2 °F (36.2 °C) -- 18 115/62 81 -- -- -- Lying -- --    10/23/24 0800 -- 90 -- -- -- 95 % -- -- -- 11 --    10/23/24 0610 -- 86 -- 141/92 111 96 % -- -- -- -- --    10/23/24 0510 -- 84 -- 137/90 109 96 % -- -- -- -- --    10/23/24 0410 -- 94 -- 127/82 98 92 % -- -- -- -- --     10/23/24 0400 -- -- -- -- -- -- -- -- -- 11 --    10/23/24 0310 -- 95 -- 126/75 95 93 % -- -- -- -- --    10/23/24 0215 -- 74 -- 139/89 110 98 % -- -- -- -- --    10/23/24 0115 -- 79 -- 151/93 116 96 % -- -- -- -- --    10/23/24 0015 -- 81 -- 123/72 91 97 % -- -- -- -- --    10/23/24 0000 -- -- -- -- -- -- -- -- -- 11 --    10/22/24 2310 -- 86 -- 129/87 104 97 % -- -- -- -- --    10/22/24 2015 -- 81 -- 117/61 82 96 % -- -- -- -- --    10/22/24 2000 -- -- -- -- -- -- -- -- -- 11 --    10/22/24 1915 -- 67 -- 117/70 89 99 % -- -- -- -- --    10/22/24 1904 98.1 °F (36.7 °C) -- -- -- -- -- -- -- -- -- --    10/22/24 1715 -- 85 -- 126/84 100 97 % -- -- -- -- --    10/22/24 1612 -- 88 -- 125/77 93 100 % -- -- -- -- --    10/22/24 1600 -- 92 -- -- -- 96 % -- -- -- 11 --    10/22/24 1452 -- -- -- -- -- 94 % 40 Ventilator -- -- --    10/22/24 1309 -- 81 -- 144/93 114 97 % -- -- -- -- --    10/22/24 1245 -- 86 -- 137/87 108 -- -- -- -- -- --    10/22/24 1200 -- -- -- -- -- -- -- -- -- 11 --    10/22/24 1124 -- -- -- -- -- 97 % -- -- -- -- --    10/22/24 1115 -- 75 -- 104/65 80 -- -- -- -- -- --    10/22/24 1100 97.8 °F (36.6 °C) 77 2 104/65 80 94 % -- -- Lying -- --    10/22/24 1015 -- 87 -- 95/56 69 -- -- -- -- -- --    10/22/24 0930 -- 94 -- 98/60 74 -- -- -- -- -- --    10/22/24 0915 -- 90 -- 128/79 98 -- -- -- -- -- --    10/22/24 0900 -- 95 -- 116/73 90 96 % -- -- -- -- --    10/22/24 0822 -- -- -- -- -- 96 % -- -- -- -- --    10/22/24 0800 -- 89 -- 119/74 87 95 % -- -- -- 11 --    10/22/24 0700 98.5 °F (36.9 °C) 79 14 115/72 87 98 % -- Ventilator Lying -- --    10/22/24 0600 -- 86 -- 142/90 110 90 % -- -- -- -- --    10/22/24 0500 -- 74 -- 99/58 71 96 % -- -- -- -- --    10/22/24 0400 -- 81 -- 111/70 85 94 % -- -- -- 11 --    10/22/24 0317 -- -- -- -- -- 98 % 40 Ventilator -- -- --    10/22/24 0300 -- 59 -- 152/85 111 100 % -- -- -- -- --    10/22/24 0200 -- 62 -- 110/67 84 97 % -- -- -- -- --    10/22/24 0114 -- -- --  -- -- 100 % 40 Ventilator -- -- --    10/22/24 0100 -- 53 -- 122/78 95 99 % -- -- -- -- --    10/22/24 0020 -- -- -- -- -- 96 % 40 Ventilator -- -- --    10/22/24 0000 97 °F (36.1 °C) 60 -- 112/70 85 97 % -- -- -- 11 --          Weight (last 2 days)       Date/Time Weight    10/25/24 0600 97.9 (215.83)    10/24/24 0600 95.9 (211.42)    10/23/24 0600 95.1 (209.66)            Pertinent Labs/Diagnostic Results:   Radiology:  MRI cervical spine w wo contrast   Final Interpretation by E. Alec Schoenberger, MD (10/23 1300)      Long segment of abnormal signal in the cervical and upper thoracic spine favored to represent nonspecific transverse myelitis.  No cord expansion, atrophy or abnormal enhancement.   Recommend clinical correlation and further evaluation with CSF. Recommend 3-month follow-up contrast-enhanced MRI.                  Workstation performed: [id number]         XR chest portable   Final Interpretation by Quentin Brownlee DO (10/21 1622)      Limited study.      Persistent left basilar opacity may represent atelectasis and or pneumonia.      Prominent pulmonary vasculature may be accentuated by low lung volumes versus mild volume overload.      Workstation performed: [id number]         XR chest portable ICU   Final Interpretation by Joan Mcintyre MD (10/17 2027)      Persistent extensive opacity in the left lower lobe with loss of the diaphragm which could be due to atelectasis and/or pneumonia in the appropriate clinical setting.            Workstation performed: [id number]         XR chest portable   Final Interpretation by Tobias Leon MD (10/17 1328)      Study limited by low lung volumes. Persistent dense  left  lung base opacity which may represent a combination of effusion and parenchymal opacity.            Workstation performed: [id number]         MRI brain w wo contrast   Final Interpretation by Andi Mccloud MD (10/17 0112)      Mildly restricted diffusion in the  splenium of the corpus callosum and more subtly in the anterior cingulate gyri. Possible sequela of hypoxic ischemic injury, inflammatory or infectious process.      The study was marked in EPIC for immediate notification..      Workstation performed: [id number]         XR chest portable ICU   Final Interpretation by Jewel Burton MD (10/15 0521)      Opacification at both lung bases is unchanged likely due to atelectasis. Infiltrates and pleural effusions not excluded.            Workstation performed: [id number]         CT chest abdomen pelvis w contrast   Final Interpretation by Philippe Coates MD (10/11 1426)         1. Moderate bibasilar pulmonary opacification consistent with atelectasis; superimposed pneumonia not entirely excludable. Trace left greater than right pleural effusions.   2. No acute abnormality identified in the abdomen or pelvis.   3. Additional findings as noted.               Workstation performed: [id number]         XR chest portable ICU   Final Interpretation by Brendon Snyder MD (10/11 0356)      Left basilar effusion and atelectasis redemonstrated. Milder right basilar effusion/subsegmental atelectasis, also similar.         Workstation performed: [id number]         CT head wo contrast   Final Interpretation by Andi Mccloud MD (10/09 2001)      No acute intracranial abnormality.                  Workstation performed: [id number]         XR chest portable ICU   Final Interpretation by Andrea Bai MD (10/09 0823)      Left basilar effusion and atelectasis redemonstrated. Milder right basilar effusion/subsegmental atelectasis, also similar.            Workstation performed: [id number]         XR chest portable ICU   Final Interpretation by Carlos Atkinson MD (10/07 1303)      Near complete opacification of the left hemithorax with leftward mediastinal shift, mildly improved from 10/5/2024, suggesting baseline underlying atelectasis and pleural  effusion.      Note, left lung pneumonia cannot be excluded.      No pneumothorax.            Resident: SONAM KNOX I, the attending radiologist, have reviewed the images and agree with the final report above.      Workstation performed: [id number]         MRI brain w wo contrast    (Results Pending)   IR lumbar puncture    (Results Pending)     Cardiology:  ECG 12 lead   Final Result by Roe Carrasco MD (10/21 1037)   Sinus bradycardia   Normal ECG   When compared with ECG of 09-OCT-2024 11:56,   Nonspecific ST abnormality is no longer Present   Confirmed by Roe Carrasco ([id number]) on 10/21/2024 10:37:42 AM      Echo follow up/limited w/ contrast if indicated   Final Result by Rik Maurer MD (10/15 7014)        Left Ventricle: Left ventricular cavity size is normal. Wall thickness    is normal. The left ventricular ejection fraction is 65%. Systolic    function is normal. Wall motion is normal. Diastolic function is normal.     Right Ventricle: Right ventricular cavity size is moderately dilated.     Tricuspid Valve: There is mild regurgitation.     Prior TTE study available for comparison. Prior study date: 6/11/2024.    Changes noted when compared to prior study. Changes include: Right    ventricle is dilated now..     This was a limited study.         ECG 12 lead   Final Result by Manuela Ayala MD (10/09 0258)   Sinus bradycardia   RSR' or QR pattern in V1 suggests right ventricular conduction delay   Nonspecific ST and T wave abnormality   Abnormal ECG   When compared with ECG of 05-OCT-2024 19:36,   Vent. rate has decreased BY  63 BPM   RSR' pattern in V1 is now Present   Confirmed by Manuela Ayala ([id number]) on 10/9/2024 6:48:48 PM        GI:  Bronchoscopy   Final Result by Yessy Barksdale MD (10/21 1301)   All observed locations appeared normal, including the lower trachea, main    prashant, left main stem, JOSÉ MANUEL, lingula, LLL, right main stem, RUL, bronchus    intermedius, RML and RLL.    Bronchoalveolar lavage was performed x1 in the left main stem and the    fluid appeared cloudy and purulent         RECOMMENDATION:   There is no recommended follow-up for this procedure.          Attending attestation   Patient was preoxygenated to the ventilator and given sedation   Bronchoscopy initially performed with large bronchoscope disposable/orange    but there was difficulty getting through the tracheostomy tubing, the    inner cannula was caught and would not allow the bronchoscope to proceed.     This needed to be removed.  He did desaturate during this event as this    occluded his airway.  We changed this out with a new inner cannula along    with a medium size bronchoscope.   Air inspection showed diffuse accretions throughout the airway.   Left mainstem BAL      Yessy Barksdale MD   Pulmonary and Critical Care Medicine          Bronchoscopy   Final Result by El Sams MD (10/20 1438)   Moderate, thick and purulent secretions present in the left main stem,    left upper lobar bronchus, lingular lobar bronchus, left lower lobar    bronchus, right main stem, right upper lobar bronchus, bronchus    intermedius, right middle lobar bronchus and right lower lobar bronchus;    performed therapeutic aspiration   Erythematous, friable mucosa in the left main stem, left upper lobar    bronchus, lingular lobar bronchus, left lower lobar bronchus, right main    stem, right upper lobar bronchus, bronchus intermedius, right middle lobar    bronchus and right lower lobar bronchus         RECOMMENDATION:   There is no recommended follow-up for this procedure.       I was the supervising physician and present for the entire procedure on    10/20/2024      There was diffuse bilateral mucous plugging of both lungs that was    suctioned and cleared.  Bilateral airway survey to the subsegmental    bronchi without any evidence of endobronchial lesions, extrinsic    compression, or active bleeding.      El Sams MD       Bronchoscopy   Final Result by El Sams MD (10/15 1759)   Left lung mucus plugging- suctioned and cleared         RECOMMENDATION:   Send trap for gram stain and culture   Continue to suction as needed and airway clearance      El Sams MD      Bronchoscopy   Final Result by Silvio Alas MD (10/06 9197)   All observed locations appeared normal, including the pharynx, larynx,    vocal cords, trachea, main prashant, left lung and right lung.   Excessive, thick and white secretions present in the lingula and LLL;    performed therapeutic aspiration         RECOMMENDATION:   Follow up cultures   Continue to monitor in ICU           Results from last 7 days   Lab Units 10/25/24  0441 10/24/24  0439 10/23/24  0636 10/22/24  0528 10/21/24  0406 10/20/24  0244   WBC Thousand/uL 9.31 7.42 9.18 9.81 12.08* 9.39   HEMOGLOBIN g/dL 9.8* 9.7* 11.2* 10.3* 10.5* 10.5*   HEMATOCRIT % 31.8* 31.2* 36.0* 33.1* 33.8* 34.0*   PLATELETS Thousands/uL 209 195 238 209 206 200   TOTAL NEUT ABS Thousands/µL  --   --   --  8.18* 10.14* 7.57     Results from last 7 days   Lab Units 10/25/24  0441 10/24/24  0439 10/23/24  0636 10/22/24  0528 10/21/24  0406 10/20/24  0244   SODIUM mmol/L 134* 136 137 134* 135 135   POTASSIUM mmol/L 4.0 4.0 4.2 4.1 4.0 3.9   CHLORIDE mmol/L 94* 96 97 100 100 103   CO2 mmol/L 37* 37* 34* 34* 31 29   ANION GAP mmol/L 3* 3* 6 0* 4 3*   BUN mg/dL 11 11 12 10 11 12   CREATININE mg/dL 0.47* 0.40* 0.45* 0.43* 0.44* 0.50*   EGFR ml/min/1.73sq m 142 151 144 147 145 138   CALCIUM mg/dL 8.7 8.6 8.9 8.5 8.4 8.3*   CALCIUM, IONIZED mmol/L 1.13  --  1.16 1.13 1.12 1.16   MAGNESIUM mg/dL 1.8*  --  2.0 1.9 1.9 2.0   PHOSPHORUS mg/dL 3.3  --  3.1 3.4 2.7 3.0     Results from last 7 days   Lab Units 10/25/24  0441 10/24/24  0439 10/23/24  0636 10/22/24  0528 10/21/24  0406 10/20/24  0244 10/19/24  0330   GLUCOSE RANDOM mg/dL 83 108 101 113 101 107 78     Results from last 7 days   Lab Units 10/19/24  0330    PROCALCITONIN ng/ml 0.21     Results from last 7 days   Lab Units 10/19/24  0649   LACTIC ACID mmol/L 0.8     Results from last 7 days   Lab Units 10/21/24  1254   GRAM STAIN RESULT  2+ Polys*  2+ Gram positive rods*  1+ Gram negative rods*     Network Utilization Review Department  ATTENTION: Please call with any questions or concerns to [phone] and carefully listen to the prompts so that you are directed to the right person. All voicemails are confidential.   For Discharge needs, contact Care Management DC Support Team at [phone] opt. 2  Send all requests for admission clinical reviews, approved or denied determinations and any other requests to dedicated fax number below belonging to the campus where the patient is receiving treatment. List of dedicated fax numbers for the Facilities:  FACILITY NAME UR FAX NUMBER   ADMISSION DENIALS (Administrative/Medical Necessity) [phone]   DISCHARGE SUPPORT TEAM (NETWORK) [phone]   PARENT CHILD HEALTH (Maternity/NICU/Pediatrics) [phone]   [facility] [phone]   [facility] [phone]   [facility] [phone]   [facility] [phone]   [facility] [phone]   [facility] [phone]   [facility] [phone]   [facility] [phone]   [facility] [phone]   [facility] [phone]   [facility] [phone]   [facility] [phone]

## 2024-10-26 ENCOUNTER — APPOINTMENT (INPATIENT)
Dept: GASTROENTEROLOGY | Facility: HOSPITAL | Age: 39
DRG: 720 | End: 2024-10-26
Attending: STUDENT IN AN ORGANIZED HEALTH CARE EDUCATION/TRAINING PROGRAM
Payer: COMMERCIAL

## 2024-10-26 ENCOUNTER — APPOINTMENT (INPATIENT)
Dept: GASTROENTEROLOGY | Facility: HOSPITAL | Age: 39
DRG: 720 | End: 2024-10-26
Payer: COMMERCIAL

## 2024-10-26 PROBLEM — G37.3 TRANSVERSE MYELITIS (HCC): Status: ACTIVE | Noted: 2024-10-17

## 2024-10-26 LAB
ANION GAP SERPL CALCULATED.3IONS-SCNC: 5 MMOL/L (ref 4–13)
BUN SERPL-MCNC: 14 MG/DL (ref 5–25)
CALCIUM SERPL-MCNC: 8.7 MG/DL (ref 8.4–10.2)
CHLORIDE SERPL-SCNC: 96 MMOL/L (ref 96–108)
CO2 SERPL-SCNC: 33 MMOL/L (ref 21–32)
CREAT SERPL-MCNC: 0.5 MG/DL (ref 0.6–1.3)
ERYTHROCYTE [DISTWIDTH] IN BLOOD BY AUTOMATED COUNT: 17.4 % (ref 11.6–15.1)
GFR SERPL CREATININE-BSD FRML MDRD: 138 ML/MIN/1.73SQ M
GLUCOSE SERPL-MCNC: 165 MG/DL (ref 65–140)
HCT VFR BLD AUTO: 32.2 % (ref 36.5–49.3)
HGB BLD-MCNC: 10.4 G/DL (ref 12–17)
MAGNESIUM SERPL-MCNC: 1.9 MG/DL (ref 1.9–2.7)
MCH RBC QN AUTO: 30.9 PG (ref 26.8–34.3)
MCHC RBC AUTO-ENTMCNC: 32.3 G/DL (ref 31.4–37.4)
MCV RBC AUTO: 96 FL (ref 82–98)
PHOSPHATE SERPL-MCNC: 2.8 MG/DL (ref 2.7–4.5)
PLATELET # BLD AUTO: 195 THOUSANDS/UL (ref 149–390)
PMV BLD AUTO: 10.1 FL (ref 8.9–12.7)
POTASSIUM SERPL-SCNC: 4.1 MMOL/L (ref 3.5–5.3)
RBC # BLD AUTO: 3.37 MILLION/UL (ref 3.88–5.62)
SODIUM SERPL-SCNC: 134 MMOL/L (ref 135–147)
WBC # BLD AUTO: 5.1 THOUSAND/UL (ref 4.31–10.16)

## 2024-10-26 PROCEDURE — 94003 VENT MGMT INPAT SUBQ DAY: CPT

## 2024-10-26 PROCEDURE — 0B998ZZ DRAINAGE OF LINGULA BRONCHUS, VIA NATURAL OR ARTIFICIAL OPENING ENDOSCOPIC: ICD-10-PCS | Performed by: STUDENT IN AN ORGANIZED HEALTH CARE EDUCATION/TRAINING PROGRAM

## 2024-10-26 PROCEDURE — 85027 COMPLETE CBC AUTOMATED: CPT

## 2024-10-26 PROCEDURE — 0B9B8ZZ DRAINAGE OF LEFT LOWER LOBE BRONCHUS, VIA NATURAL OR ARTIFICIAL OPENING ENDOSCOPIC: ICD-10-PCS | Performed by: STUDENT IN AN ORGANIZED HEALTH CARE EDUCATION/TRAINING PROGRAM

## 2024-10-26 PROCEDURE — 0B948ZZ DRAINAGE OF RIGHT UPPER LOBE BRONCHUS, VIA NATURAL OR ARTIFICIAL OPENING ENDOSCOPIC: ICD-10-PCS | Performed by: STUDENT IN AN ORGANIZED HEALTH CARE EDUCATION/TRAINING PROGRAM

## 2024-10-26 PROCEDURE — 0B938ZZ DRAINAGE OF RIGHT MAIN BRONCHUS, VIA NATURAL OR ARTIFICIAL OPENING ENDOSCOPIC: ICD-10-PCS | Performed by: STUDENT IN AN ORGANIZED HEALTH CARE EDUCATION/TRAINING PROGRAM

## 2024-10-26 PROCEDURE — 83735 ASSAY OF MAGNESIUM: CPT

## 2024-10-26 PROCEDURE — 0B938ZX DRAINAGE OF RIGHT MAIN BRONCHUS, VIA NATURAL OR ARTIFICIAL OPENING ENDOSCOPIC, DIAGNOSTIC: ICD-10-PCS | Performed by: STUDENT IN AN ORGANIZED HEALTH CARE EDUCATION/TRAINING PROGRAM

## 2024-10-26 PROCEDURE — 80048 BASIC METABOLIC PNL TOTAL CA: CPT

## 2024-10-26 PROCEDURE — 94150 VITAL CAPACITY TEST: CPT

## 2024-10-26 PROCEDURE — 84100 ASSAY OF PHOSPHORUS: CPT

## 2024-10-26 PROCEDURE — 31615 TRCHEOBRNCHSC EST TRACHS INC: CPT | Performed by: NURSE PRACTITIONER

## 2024-10-26 PROCEDURE — 0B968ZZ DRAINAGE OF RIGHT LOWER LOBE BRONCHUS, VIA NATURAL OR ARTIFICIAL OPENING ENDOSCOPIC: ICD-10-PCS | Performed by: STUDENT IN AN ORGANIZED HEALTH CARE EDUCATION/TRAINING PROGRAM

## 2024-10-26 PROCEDURE — 0B958ZZ DRAINAGE OF RIGHT MIDDLE LOBE BRONCHUS, VIA NATURAL OR ARTIFICIAL OPENING ENDOSCOPIC: ICD-10-PCS | Performed by: STUDENT IN AN ORGANIZED HEALTH CARE EDUCATION/TRAINING PROGRAM

## 2024-10-26 PROCEDURE — 94640 AIRWAY INHALATION TREATMENT: CPT

## 2024-10-26 PROCEDURE — 31622 DX BRONCHOSCOPE/WASH: CPT | Performed by: NURSE PRACTITIONER

## 2024-10-26 PROCEDURE — 94760 N-INVAS EAR/PLS OXIMETRY 1: CPT

## 2024-10-26 PROCEDURE — 99291 CRITICAL CARE FIRST HOUR: CPT | Performed by: STUDENT IN AN ORGANIZED HEALTH CARE EDUCATION/TRAINING PROGRAM

## 2024-10-26 PROCEDURE — 94668 MNPJ CHEST WALL SBSQ: CPT

## 2024-10-26 PROCEDURE — 0B988ZZ DRAINAGE OF LEFT UPPER LOBE BRONCHUS, VIA NATURAL OR ARTIFICIAL OPENING ENDOSCOPIC: ICD-10-PCS | Performed by: STUDENT IN AN ORGANIZED HEALTH CARE EDUCATION/TRAINING PROGRAM

## 2024-10-26 PROCEDURE — 94669 MECHANICAL CHEST WALL OSCILL: CPT

## 2024-10-26 PROCEDURE — 31622 DX BRONCHOSCOPE/WASH: CPT | Performed by: STUDENT IN AN ORGANIZED HEALTH CARE EDUCATION/TRAINING PROGRAM

## 2024-10-26 PROCEDURE — 0B978ZX DRAINAGE OF LEFT MAIN BRONCHUS, VIA NATURAL OR ARTIFICIAL OPENING ENDOSCOPIC, DIAGNOSTIC: ICD-10-PCS | Performed by: STUDENT IN AN ORGANIZED HEALTH CARE EDUCATION/TRAINING PROGRAM

## 2024-10-26 RX ORDER — ACETYLCYSTEINE 200 MG/ML
3 SOLUTION ORAL; RESPIRATORY (INHALATION)
Status: DISCONTINUED | OUTPATIENT
Start: 2024-10-27 | End: 2024-10-26

## 2024-10-26 RX ORDER — LIDOCAINE HYDROCHLORIDE 10 MG/ML
INJECTION, SOLUTION EPIDURAL; INFILTRATION; INTRACAUDAL; PERINEURAL
Status: DISCONTINUED
Start: 2024-10-26 | End: 2024-10-26 | Stop reason: WASHOUT

## 2024-10-26 RX ORDER — CLONAZEPAM 0.5 MG/1
0.5 TABLET ORAL
Status: DISCONTINUED | OUTPATIENT
Start: 2024-10-26 | End: 2024-10-29

## 2024-10-26 RX ORDER — ACETYLCYSTEINE 200 MG/ML
3 SOLUTION ORAL; RESPIRATORY (INHALATION)
Status: DISCONTINUED | OUTPATIENT
Start: 2024-10-26 | End: 2024-10-26

## 2024-10-26 RX ORDER — ACETYLCYSTEINE 200 MG/ML
3 SOLUTION ORAL; RESPIRATORY (INHALATION)
Status: DISCONTINUED | OUTPATIENT
Start: 2024-10-26 | End: 2024-10-27

## 2024-10-26 RX ADMIN — GUAIFENESIN 400 MG: 200 SOLUTION ORAL at 05:56

## 2024-10-26 RX ADMIN — LEVALBUTEROL HYDROCHLORIDE 1.25 MG: 1.25 SOLUTION RESPIRATORY (INHALATION) at 07:27

## 2024-10-26 RX ADMIN — CHLORHEXIDINE GLUCONATE 15 ML: 1.2 RINSE ORAL at 21:13

## 2024-10-26 RX ADMIN — APIXABAN 5 MG: 5 TABLET, FILM COATED ORAL at 17:58

## 2024-10-26 RX ADMIN — GUAIFENESIN 400 MG: 200 SOLUTION ORAL at 17:59

## 2024-10-26 RX ADMIN — LEVALBUTEROL HYDROCHLORIDE 1.25 MG: 1.25 SOLUTION RESPIRATORY (INHALATION) at 01:49

## 2024-10-26 RX ADMIN — LEVALBUTEROL HYDROCHLORIDE 1.25 MG: 1.25 SOLUTION RESPIRATORY (INHALATION) at 20:16

## 2024-10-26 RX ADMIN — LORAZEPAM 2 MG: 2 INJECTION INTRAMUSCULAR; INTRAVENOUS at 19:54

## 2024-10-26 RX ADMIN — ACETYLCYSTEINE 600 MG: 200 SOLUTION ORAL; RESPIRATORY (INHALATION) at 20:16

## 2024-10-26 RX ADMIN — GABAPENTIN 300 MG: 250 SOLUTION ORAL at 00:50

## 2024-10-26 RX ADMIN — Medication 37.5 G: at 15:41

## 2024-10-26 RX ADMIN — LORAZEPAM 2 MG: 2 INJECTION INTRAMUSCULAR; INTRAVENOUS at 09:29

## 2024-10-26 RX ADMIN — LORAZEPAM 2 MG: 2 INJECTION INTRAMUSCULAR; INTRAVENOUS at 13:48

## 2024-10-26 RX ADMIN — CLOBETASOL PROPIONATE: 0.5 CREAM TOPICAL at 09:01

## 2024-10-26 RX ADMIN — GABAPENTIN 300 MG: 250 SOLUTION ORAL at 15:44

## 2024-10-26 RX ADMIN — GABAPENTIN 300 MG: 250 SOLUTION ORAL at 23:06

## 2024-10-26 RX ADMIN — LEVALBUTEROL HYDROCHLORIDE 1.25 MG: 1.25 SOLUTION RESPIRATORY (INHALATION) at 13:01

## 2024-10-26 RX ADMIN — GUAIFENESIN 400 MG: 200 SOLUTION ORAL at 23:06

## 2024-10-26 RX ADMIN — CHLORHEXIDINE GLUCONATE 15 ML: 1.2 RINSE ORAL at 09:01

## 2024-10-26 RX ADMIN — GUAIFENESIN 400 MG: 200 SOLUTION ORAL at 11:39

## 2024-10-26 RX ADMIN — CLOBETASOL PROPIONATE: 0.5 CREAM TOPICAL at 17:58

## 2024-10-26 RX ADMIN — GABAPENTIN 300 MG: 250 SOLUTION ORAL at 08:24

## 2024-10-26 RX ADMIN — CLONAZEPAM 0.5 MG: 0.5 TABLET ORAL at 21:12

## 2024-10-26 RX ADMIN — APIXABAN 5 MG: 5 TABLET, FILM COATED ORAL at 09:01

## 2024-10-26 RX ADMIN — SODIUM CHLORIDE 1000 MG: 0.9 INJECTION, SOLUTION INTRAVENOUS at 09:23

## 2024-10-26 RX ADMIN — Medication 3 MG: at 21:13

## 2024-10-26 NOTE — PROCEDURES
Bronchoscopy    Date/Time: 10/26/2024 1:00 PM    Performed by: AMBER Cruz  Authorized by: AMBER Cruz    Patient location:  Bedside  Consent:     Consent obtained:  Emergent situation  Universal protocol:     Relevant documents present and verified: yes      Test results available and properly labeled: yes      Radiology Images displayed and confirmed.  If images not available, report reviewed: yes      Required blood products, implants, devices and special equipment available: yes      Site/side marked: yes      Immediately prior to procedure a time out was called: yes      Patient identity confirmed:  Anonymous protocol, patient vented/unresponsive  Indications:     Procedure Purpose: therapeutic      Indications: other (comment)      Indications comment:  Copious sputum production  Anesthesia (see MAR for exact dosages):     Anesthesia method:  Topical application  Scope passed:      Bronchoscopy findings location: trach.  Upper Airway:     Faith:  Normal  Procedure details:     Description:  Mucous suctioned from R mainstem, bronchoscope advanced into RUL, RML, RLL which was irrigated for mucous clearance. Mucous was then cleared from JOSÉ MANUEL, Lingula, and LLL.   Post-procedure details:     Patient tolerance of procedure:  Tolerated well, no immediate complications

## 2024-10-26 NOTE — PLAN OF CARE
Problem: Prexisting or High Potential for Compromised Skin Integrity  Goal: Skin integrity is maintained or improved  Description: INTERVENTIONS:  - Identify patients at risk for skin breakdown  - Assess and monitor skin integrity  - Assess and monitor nutrition and hydration status  - Monitor labs   - Assess for incontinence   - Turn and reposition patient  - Assist with mobility/ambulation  - Relieve pressure over bony prominences  - Avoid friction and shearing  - Provide appropriate hygiene as needed including keeping skin clean and dry  - Evaluate need for skin moisturizer/barrier cream  - Collaborate with interdisciplinary team   - Patient/family teaching  - Consider wound care consult   Outcome: Progressing     Problem: PAIN - ADULT  Goal: Verbalizes/displays adequate comfort level or baseline comfort level  Description: Interventions:  - Encourage patient to monitor pain and request assistance  - Assess pain using appropriate pain scale  - Administer analgesics based on type and severity of pain and evaluate response  - Implement non-pharmacological measures as appropriate and evaluate response  - Consider cultural and social influences on pain and pain management  - Notify physician/advanced practitioner if interventions unsuccessful or patient reports new pain  Outcome: Progressing     Problem: INFECTION - ADULT  Goal: Absence or prevention of progression during hospitalization  Description: INTERVENTIONS:  - Assess and monitor for signs and symptoms of infection  - Monitor lab/diagnostic results  - Monitor all insertion sites, i.e. indwelling lines, tubes, and drains  - Monitor endotracheal if appropriate and nasal secretions for changes in amount and color  - [place] appropriate cooling/warming therapies per order  - Administer medications as ordered  - Instruct and encourage patient and family to use good hand hygiene technique  - Identify and instruct in appropriate isolation precautions for  identified infection/condition  Outcome: Progressing  Goal: Absence of fever/infection during neutropenic period  Description: INTERVENTIONS:  - Monitor WBC    Outcome: Progressing     Problem: SAFETY ADULT  Goal: Patient will remain free of falls  Description: INTERVENTIONS:  - Educate patient/family on patient safety including physical limitations  - Instruct patient to call for assistance with activity   - Consult OT/PT to assist with strengthening/mobility   - Keep Call bell within reach  - Keep bed low and locked with side rails adjusted as appropriate  - Keep care items and personal belongings within reach  - Initiate and maintain comfort rounds  - Make Fall Risk Sign visible to staff  - Apply yellow socks and bracelet for high fall risk patients  - Consider moving patient to room near nurses station  Outcome: Progressing  Goal: Maintain or return to baseline ADL function  Description: INTERVENTIONS:  -  Assess patient's ability to carry out ADLs; assess patient's baseline for ADL function and identify physical deficits which impact ability to perform ADLs (bathing, care of mouth/teeth, toileting, grooming, dressing, etc.)  - Assess/evaluate cause of self-care deficits   - Assess range of motion  - Assess patient's mobility; develop plan if impaired  - Assess patient's need for assistive devices and provide as appropriate  - Encourage maximum independence but intervene and supervise when necessary  - Involve family in performance of ADLs  - Assess for home care needs following discharge   - Consider OT consult to assist with ADL evaluation and planning for discharge  - Provide patient education as appropriate  Outcome: Progressing  Goal: Maintains/Returns to pre admission functional level  Description: INTERVENTIONS:  - Perform AM-PAC 6 Click Basic Mobility/ Daily Activity assessment daily.  - Set and communicate daily mobility goal to care team and patient/family/caregiver.   - Collaborate with rehabilitation  services on mobility goals if consulted  - Out of bed for toileting  - Record patient progress and toleration of activity level   Outcome: Progressing     Problem: DISCHARGE PLANNING  Goal: Discharge to home or other facility with appropriate resources  Description: INTERVENTIONS:  - Identify barriers to discharge w/patient and caregiver  - Arrange for needed discharge resources and transportation as appropriate  - Identify discharge learning needs (meds, wound care, etc.)  - Arrange for interpretive services to assist at discharge as needed  - Refer to Case Management Department for coordinating discharge planning if the patient needs post-hospital services based on physician/advanced practitioner order or complex needs related to functional status, cognitive ability, or social support system  Outcome: Progressing     Problem: Knowledge Deficit  Goal: Patient/family/caregiver demonstrates understanding of disease process, treatment plan, medications, and discharge instructions  Description: Complete learning assessment and assess knowledge base.  Interventions:  - Provide teaching at level of understanding  - Provide teaching via preferred learning methods  Outcome: Progressing     Problem: SAFETY,RESTRAINT: NV/NON-SELF DESTRUCTIVE BEHAVIOR  Goal: Remains free of harm/injury (restraint for non violent/non self-detsructive behavior)  Description: INTERVENTIONS:  - Instruct patient/family regarding restraint use   - Assess and monitor physiologic and psychological status   - Provide interventions and comfort measures to meet assessed patient needs   - Identify and implement measures to help patient regain control  - Assess readiness for release of restraint   Outcome: Progressing  Goal: Returns to optimal restraint-free functioning  Description: INTERVENTIONS:  - Assess the patient's behavior and symptoms that indicate continued need for restraint  - Identify and implement measures to help patient regain control  -  Assess readiness for release of restraint   Outcome: Progressing     Problem: Nutrition/Hydration-ADULT  Goal: Nutrient/Hydration intake appropriate for improving, restoring or maintaining nutritional needs  Description: Monitor and assess patient's nutrition/hydration status for malnutrition. Collaborate with interdisciplinary team and initiate plan and interventions as ordered.  Monitor patient's weight and dietary intake as ordered or per policy. Utilize nutrition screening tool and intervene as necessary. Determine patient's food preferences and provide high-protein, high-caloric foods as appropriate.     INTERVENTIONS:  - Monitor oral intake, urinary output, labs, and treatment plans  - Assess nutrition and hydration status and recommend course of action  - Evaluate amount of meals eaten  - Assist patient with eating if necessary   - Allow adequate time for meals  - Recommend/ encourage appropriate diets, oral nutritional supplements, and vitamin/mineral supplements  - Order, calculate, and assess calorie counts as needed  - Recommend, monitor, and adjust tube feedings and TPN/PPN based on assessed needs  - Assess need for intravenous fluids  - Provide specific nutrition/hydration education as appropriate  - Include patient/family/caregiver in decisions related to nutrition  Outcome: Progressing

## 2024-10-26 NOTE — ASSESSMENT & PLAN NOTE
Neuromuscular disease post chemotherapyS/P tracheostomy in 9/2023.  He continues to fail SBT likely in the setting of diaphragmatic weakness in the setting of progressive demyelinating/ neuromuscular disease  Goals of care discussions continues disease driven  X-ray showed worsening opacities 10/21  Multiple bronchoscopies resulted thick mucus secretions  Sputum/bronc culture showing growth of Pseudomonas (s/p 10 days treatment for Pseudomonas/Proteus)  1026 on BiPAP satting 100% PEEP of 8  Patient is drowsy  Plan:  Continue vent support  Will monitor lethargy while on BiPaP   Xopnex and hypertonic saline nebs 3 times daily  Re-started chest therapy (previously halted due to concern they were precipitating vasovagal episodes)  Consider bronchoscopy for symptomatic relief as needed

## 2024-10-26 NOTE — RESPIRATORY THERAPY NOTE
Assisted in bronch. Pt preoxygenated with 100% fio2. 50 cc NSS instilled. 80 ml secretions suctioned back into trap. No specimens requested by MD.

## 2024-10-26 NOTE — ASSESSMENT & PLAN NOTE
Noted with abnormal MRI concerning for hypoxic injury  Neurology on board recommendations to repeat paraneoplastic workup repeat MRI in about a month  MRI-C Spine: Transverse myelitis not acute no cord expansion versus sequelae from old ischemia.    Plan  Continue Solu-Medrol today is day 2 for 5 days   Continue IgG today is day 2

## 2024-10-26 NOTE — PLAN OF CARE
Problem: PAIN - ADULT  Goal: Verbalizes/displays adequate comfort level or baseline comfort level  Description: Interventions:  - Encourage patient to monitor pain and request assistance  - Assess pain using appropriate pain scale  - Administer analgesics based on type and severity of pain and evaluate response  - Implement non-pharmacological measures as appropriate and evaluate response  - Consider cultural and social influences on pain and pain management  - Notify physician/advanced practitioner if interventions unsuccessful or patient reports new pain  Outcome: Progressing     Problem: Prexisting or High Potential for Compromised Skin Integrity  Goal: Skin integrity is maintained or improved  Description: INTERVENTIONS:  - Identify patients at risk for skin breakdown  - Assess and monitor skin integrity  - Assess and monitor nutrition and hydration status  - Monitor labs   - Assess for incontinence   - Turn and reposition patient  - Assist with mobility/ambulation  - Relieve pressure over bony prominences  - Avoid friction and shearing  - Provide appropriate hygiene as needed including keeping skin clean and dry  - Evaluate need for skin moisturizer/barrier cream  - Collaborate with interdisciplinary team   - Patient/family teaching  - Consider wound care consult   Outcome: Progressing     Problem: INFECTION - ADULT  Goal: Absence or prevention of progression during hospitalization  Description: INTERVENTIONS:  - Assess and monitor for signs and symptoms of infection  - Monitor lab/diagnostic results  - Monitor all insertion sites, i.e. indwelling lines, tubes, and drains  - Monitor endotracheal if appropriate and nasal secretions for changes in amount and color  - [place] appropriate cooling/warming therapies per order  - Administer medications as ordered  - Instruct and encourage patient and family to use good hand hygiene technique  - Identify and instruct in appropriate isolation precautions for  identified infection/condition  Outcome: Progressing  Goal: Absence of fever/infection during neutropenic period  Description: INTERVENTIONS:  - Monitor WBC    Outcome: Progressing

## 2024-10-26 NOTE — PROGRESS NOTES
Progress Note - Critical Care/ICU   Name: Hemanth Swanson 39 y.o. male I MRN: [id number]  Unit/Bed#: ICU 05 I Date of Admission: 10/5/2024   Date of Service: 10/26/2024 I Hospital Day: 21       Assessment & Plan  Toxic metabolic encephalopathy  Intermittent periods of agitation, restless however alert oriented x 4 without loss of consciousness over the past few days  MRI on 10/16 with hypoxic brain injury findings  MRI C-Spine on 10/23: Findings favor nonspecific transverse myelitis; no sign of acuity due to lack of cord expansion. Unable to rule out ramifications from prior ischemia.  Goals of care discussions were currently continues with disease treatment palliative care has been follow now signed off as goals of care are clear.  Neurology following recommendations to repeat paraneoplastic workup and an MRI in about a month  [id number]: Culture/stain 2+ poly, 2+ gram-positive rods, 1+ gram-negative rods growing.    Copper 96 ug/dL                                 Detection Limit = 5      CSF Gram stain +1 CSF fluid to is clear glucose of 54  Plan  Continue monitor mental status  Per family patient is at baseline events has been persistent over the past year  Regards to restless agitation bearing-down has responded to gabapentin now at 300 TID and Ativan as needed; episodes have reduced intensity   Continue monitoring metabolic derangement  Completed 10-day course of antibiotics for Pseudomonas and Proteus mirabilis pneumonia  Monitor off of antibiotics   Touched base with neurology after MRI C-Spine: Transverse myelitis w/u ordered: Serum NMO IgG autoantibodies, copper level, surgeons antibodies, vitamin B12, folate, vitamin B1, RPR, Lyme, vitamin D 25.  LP scheduled for 10/25 via IR  Chronic respiratory failure with hypoxia and hypercapnia (HCC)  Neuromuscular disease post chemotherapyS/P tracheostomy in 9/2023.  He continues to fail SBT likely in the setting of diaphragmatic weakness in the setting of  progressive demyelinating/ neuromuscular disease  Goals of care discussions continues disease driven  X-ray showed worsening opacities 10/21  Multiple bronchoscopies resulted thick mucus secretions  Sputum/bronc culture showing growth of Pseudomonas (s/p 10 days treatment for Pseudomonas/Proteus)  1026 on BiPAP satting 100% PEEP of 8  Patient is drowsy  Plan:  Continue vent support  Will monitor lethargy while on BiPaP   Xopnex and hypertonic saline nebs 3 times daily  Re-started chest therapy (previously halted due to concern they were precipitating vasovagal episodes)  Consider bronchoscopy for symptomatic relief as needed    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HCC)  Noted with abnormal MRI concerning for hypoxic injury  Neurology on board recommendations to repeat paraneoplastic workup repeat MRI in about a month  MRI-C Spine: Transverse myelitis not acute no cord expansion versus sequelae from old ischemia.    Plan  Continue Solu-Medrol today is day 2 for 5 days   Continue IgG today is day 2  Bradycardia  Intermittent bradycardia not related to hypoxia.   He has been maintaining heart rates above 60 since medication adjustments.  Seroquel discontinue  Heart rate between episodes fluctuates from low 80s high 90s.  Continues to experience desaturations/bradycardic episodes likely vasovagal in nature  Removed scheduled benzodiazepines and opioids.  Episodes continue but have decreased in intensity     Plan  -Currently treating with Ativan as needed and manual bagging/suction   -Increased gabapentin to 300 mg 3 times daily  Sepsis (HCC)  Sepsis likely secondary to multifocal pneumonia in the setting of Proteus and Pseudomonas day 10 on antibiotics today for completion.   Now shock undifferentiated consider hypovolemic in the setting of insensible losses, echocardiogram has been remained unremarkable doubt sepsis at this time given adequate treatment on antibiotics for 10 days  Blood pressure remained with maps above 70 overnight.   He did not require pressors.  Will consider POCUS disease continuing evaluate IVC urine output has slowed down  Plan  If pressures are low, will trial of 500 cc Isolyte with monitoring urinary output.   Seizure-like activity (HCC)  No evidence of seizure on EMU on multiple admissions including current one  Seizure-like activity is likely stereotypical behaviors likely related to agitation; responding well to Ativan as needed  Umbilical hernia without obstruction and without gangrene  Evaluated by surgery.  Found to not be a surgical candidate due to ongoing comorbidities.  Fat containing incarcerated hernia without bowel.  Palliative care by specialist  Palliative care is following.  Had long discussion with family about goals of care.  They would like to continue all measures.  The are willing to care for him at home.  They reiterated their wishes that he would not like to be sent to a rehab facility.  Goals of care, counseling/discussion  Goals of care discussions multiple admissions including current 1 palliative care follow-up continues with disease driven treatments  Discussed with mother, sister patient unfortunately continues with poor prognosis  Patient has 24/7 support at home  Case management following assistance upon discharge  Rash of back  Patient has a persistent rash on his left back.  Does not cross the midline but expands across several dermatomes.  Rash is papular/pustule and crusted in places.  Rash was first noted over a week ago on this admission but patient's sister endorses rash being present over the course of a year.  They have tried over-the-counter creams for relief with no significant improvement.    Plan  Dermatology consulted, on board  2 punch biopsies were obtained cervical left shoulder  Will follow-up results  Temovate applied twice daily for no more than 2 weeks  Disposition: Critical care    ICU Core Measures     Vented Patient  VAP Bundle  VAP bundle ordered     A: Assess,  Prevent, and Manage Pain Has pain been assessed? No  Need for changes to pain regimen? No   B: Both Spontaneous Awakening Trials (SATs) and Spontaneous Breathing Trials (SBTs) Plan to perform spontaneous awakening trial today? No secondary to severe hypoxia/hypocapnia   Plan to perform spontaneous breathing trial today? No secondary to severe hypoxia/hypocapnia   Obvious barriers to extubation? Yes   C: Choice of Sedation RASS Goal: -1 Drowsy or 0 Alert and Calm  Need for changes to sedation or analgesia regimen? No   D: Delirium CAM-ICU: Negative   E: Early Mobility  Plan for early mobility? No   F: Family Engagement Plan for family engagement today? Yes       Review of Invasive Devices:      Central access plan: Medications requiring central line      Prophylaxis:  VTE VTE covered by:  apixaban, Per PEG Tube, 5 mg at 10/26/24 0901       Stress Ulcer  not ordered         24 Hour Events : patient became hypoxic with bradycardia 3 times overnight. Responded well to bagging.   Subjective   Patient was lethargic.  But was able to answer questions.  Denies shortness of breath, chest pain, palpitation, headache nausea, vomiting. Rest of ROS was limited as patient was drowsy.     Review of Systems: See HPI for Review of Systems    Objective :                   Vitals I/O      Most Recent Min/Max in 24hrs   Temp 100 °F (37.8 °C) Temp  Min: 98.5 °F (36.9 °C)  Max: 100 °F (37.8 °C)   Pulse 68 Pulse  Min: 55  Max: 92   Resp 14 Resp  Min: 14  Max: 18   /88 BP  Min: 109/68  Max: 164/94   O2 Sat 96 % SpO2  Min: 93 %  Max: 100 %      Intake/Output Summary (Last 24 hours) at 10/26/2024 1025  Last data filed at 10/26/2024 0800  Gross per 24 hour   Intake 3475.08 ml   Output 1975 ml   Net 1500.08 ml       Diet Enteral/Parenteral; Tube Feeding No Oral Diet; Jevity 1.5; Continuous; 70; 200; Water; Every 4 hours    Invasive Monitoring           Physical Exam   Physical Exam  Eyes:      General: No foreign body in eyeNo scleral  icterus.        Right eye: No discharge.         Left eye: No discharge.      Extraocular Movements: Extraocular movements intact.      Conjunctiva/sclera: Conjunctivae normal.   Skin:     Findings: Rash present.      Comments: Rash on left flank    HENT:      Head: Normocephalic and atraumatic.      Nose: No congestion.   Neck:      Trachea: Tracheostomy present.   Cardiovascular:      Rate and Rhythm: Normal rate and regular rhythm.      Heart sounds: Normal heart sounds.   Musculoskeletal:         General: Swelling present.      Right lower leg: Trace Edema present.      Left lower leg: Trace Edema present.   Abdominal:      Palpations: Abdomen is soft.      Tenderness: There is no abdominal tenderness.   Constitutional:       Appearance: He is well-developed.      Interventions: He is intubated.   Pulmonary:      Effort: Pulmonary effort is normal. He is intubated.      Breath sounds: Rhonchi present.      Comments: Satting 100% on PEEP of 8     Neurological:      Mental Status: He is alert. He is calm.      Cranial Nerves: No facial asymmetry.   Genitourinary/Anorectal:  external catheter present.        Diagnostic Studies        Lab Results: I have reviewed the following results:     Medications:  Scheduled PRN   apixaban, 5 mg, BID  chlorhexidine, 15 mL, Q12H ISAEL  clobetasol, , BID  gabapentin, 300 mg, Q8H  guaiFENesin, 400 mg, Q6H  immune globulin, human, 400 mg/kg (Adjusted), Q24H  levalbuterol, 1.25 mg, Q6H  melatonin, 3 mg, HS  methylPREDNISolone sodium succinate, 1,000 mg, Daily      acetaminophen, 650 mg, Q6H PRN  bisacodyl, 10 mg, Daily PRN  fentaNYL, 50 mcg, Q2H PRN  LORazepam, 2 mg, Q4H PRN  oxyCODONE, 5 mg, Q6H PRN       Continuous          Labs:   CBC    Recent Labs     10/25/24  0441 10/26/24  0425   WBC 9.31 5.10   HGB 9.8* 10.4*   HCT 31.8* 32.2*    195     BMP    Recent Labs     10/25/24  0441 10/26/24  0425   SODIUM 134* 134*   K 4.0 4.1   CL 94* 96   CO2 37* 33*   AGAP 3* 5   BUN 11 14    CREATININE 0.47* 0.50*   CALCIUM 8.7 8.7       Coags    No recent results     Additional Electrolytes  Recent Labs     10/25/24  0441 10/26/24  0425   MG 1.8* 1.9   PHOS 3.3 2.8   CAIONIZED 1.13  --           Blood Gas    No recent results  No recent results LFTs  No recent results    Infectious  No recent results  Glucose  Recent Labs     10/25/24  0441 10/26/24  0425   GLUC 83 165*

## 2024-10-26 NOTE — RESPIRATORY THERAPY NOTE
Pt vent alarming. Pt peak pressures high, tidal volume not being delivered. Began to manually ventilate pt through vent with manual breaths. Pt pips in 70s, tidal volumes under 100mL, pt desaturated into 60s. Pt taken off vent and bagged. Pt difficult to bag. Lavaged and suctioned pt for large amount of thick, yellow secretions. Took out inner cannula approximately 70% occluded. New inner cannula placed. RN stated that she has bagged pt three times this morning already.

## 2024-10-26 NOTE — ASSESSMENT & PLAN NOTE
Intermittent periods of agitation, restless however alert oriented x 4 without loss of consciousness over the past few days  MRI on 10/16 with hypoxic brain injury findings  MRI C-Spine on 10/23: Findings favor nonspecific transverse myelitis; no sign of acuity due to lack of cord expansion. Unable to rule out ramifications from prior ischemia.  Goals of care discussions were currently continues with disease treatment palliative care has been follow now signed off as goals of care are clear.  Neurology following recommendations to repeat paraneoplastic workup and an MRI in about a month  [id number]/21: Culture/stain 2+ poly, 2+ gram-positive rods, 1+ gram-negative rods growing.    Copper 96 ug/dL                                 Detection Limit = 5      CSF Gram stain +1 CSF fluid to is clear glucose of 54  Plan  Continue monitor mental status  Per family patient is at baseline events has been persistent over the past year  Regards to restless agitation bearing-down has responded to gabapentin now at 300 TID and Ativan as needed; episodes have reduced intensity   Continue monitoring metabolic derangement  Completed 10-day course of antibiotics for Pseudomonas and Proteus mirabilis pneumonia  Monitor off of antibiotics   Touched base with neurology after MRI C-Spine: Transverse myelitis w/u ordered: Serum NMO IgG autoantibodies, copper level, surgeons antibodies, vitamin B12, folate, vitamin B1, RPR, Lyme, vitamin D 25.  LP scheduled for 10/25 via IR

## 2024-10-26 NOTE — RESPIRATORY THERAPY NOTE
Pt taken off vent and bagged due to high pips and not receiving volumes on vent. Pt placed back on vent after episode.

## 2024-10-26 NOTE — RESPIRATORY THERAPY NOTE
Pt given therapeutic bronch. Pt preoxygenated with 100% fio2. 50 mL NSS instilled during procedure. 90 ml suctioned into trap. Pt tolerated procedure well.

## 2024-10-26 NOTE — QUICK NOTE
Updated patient's sister Dmitry. Informed sister that patient is still demonstrating agitation, on restraints, needed Ativan this afternoon, and cannot use cell phone at this time. All questions and concerns regarding management were addressed.

## 2024-10-27 ENCOUNTER — APPOINTMENT (INPATIENT)
Dept: RADIOLOGY | Facility: HOSPITAL | Age: 39
DRG: 720 | End: 2024-10-27
Payer: COMMERCIAL

## 2024-10-27 ENCOUNTER — APPOINTMENT (INPATIENT)
Dept: GASTROENTEROLOGY | Facility: HOSPITAL | Age: 39
DRG: 720 | End: 2024-10-27
Attending: STUDENT IN AN ORGANIZED HEALTH CARE EDUCATION/TRAINING PROGRAM
Payer: COMMERCIAL

## 2024-10-27 LAB
ALBUMIN SERPL BCG-MCNC: 2.9 G/DL (ref 3.5–5)
ALP SERPL-CCNC: 83 U/L (ref 34–104)
ALT SERPL W P-5'-P-CCNC: 55 U/L (ref 7–52)
ANION GAP SERPL CALCULATED.3IONS-SCNC: 4 MMOL/L (ref 4–13)
ANISOCYTOSIS BLD QL SMEAR: PRESENT
AST SERPL W P-5'-P-CCNC: 23 U/L (ref 13–39)
BASOPHILS # BLD MANUAL: 0 THOUSAND/UL (ref 0–0.1)
BASOPHILS NFR MAR MANUAL: 0 % (ref 0–1)
BILIRUB SERPL-MCNC: 0.53 MG/DL (ref 0.2–1)
BUN SERPL-MCNC: 19 MG/DL (ref 5–25)
CA-I BLD-SCNC: 1.1 MMOL/L (ref 1.12–1.32)
CALCIUM ALBUM COR SERPL-MCNC: 9.5 MG/DL (ref 8.3–10.1)
CALCIUM SERPL-MCNC: 8.6 MG/DL (ref 8.4–10.2)
CHLORIDE SERPL-SCNC: 96 MMOL/L (ref 96–108)
CO2 SERPL-SCNC: 32 MMOL/L (ref 21–32)
CREAT SERPL-MCNC: 0.44 MG/DL (ref 0.6–1.3)
EOSINOPHIL # BLD MANUAL: 0 THOUSAND/UL (ref 0–0.4)
EOSINOPHIL NFR BLD MANUAL: 0 % (ref 0–6)
ERYTHROCYTE [DISTWIDTH] IN BLOOD BY AUTOMATED COUNT: 17.6 % (ref 11.6–15.1)
GFR SERPL CREATININE-BSD FRML MDRD: 145 ML/MIN/1.73SQ M
GLUCOSE SERPL-MCNC: 160 MG/DL (ref 65–140)
HCT VFR BLD AUTO: 31.3 % (ref 36.5–49.3)
HGB BLD-MCNC: 9.7 G/DL (ref 12–17)
LYMPHOCYTES # BLD AUTO: 0.3 THOUSAND/UL (ref 0.6–4.47)
LYMPHOCYTES # BLD AUTO: 4 % (ref 14–44)
MAGNESIUM SERPL-MCNC: 1.9 MG/DL (ref 1.9–2.7)
MCH RBC QN AUTO: 30 PG (ref 26.8–34.3)
MCHC RBC AUTO-ENTMCNC: 31 G/DL (ref 31.4–37.4)
MCV RBC AUTO: 97 FL (ref 82–98)
MONOCYTES # BLD AUTO: 0.22 THOUSAND/UL (ref 0–1.22)
MONOCYTES NFR BLD: 3 % (ref 4–12)
NEUTROPHILS # BLD MANUAL: 6.88 THOUSAND/UL (ref 1.85–7.62)
NEUTS SEG NFR BLD AUTO: 93 % (ref 43–75)
PHOSPHATE SERPL-MCNC: 1.9 MG/DL (ref 2.7–4.5)
PLATELET # BLD AUTO: 185 THOUSANDS/UL (ref 149–390)
PLATELET BLD QL SMEAR: ADEQUATE
PMV BLD AUTO: 10.4 FL (ref 8.9–12.7)
POLYCHROMASIA BLD QL SMEAR: PRESENT
POTASSIUM SERPL-SCNC: 3.8 MMOL/L (ref 3.5–5.3)
PROT SERPL-MCNC: 7.1 G/DL (ref 6.4–8.4)
RBC # BLD AUTO: 3.23 MILLION/UL (ref 3.88–5.62)
RBC MORPH BLD: PRESENT
SODIUM SERPL-SCNC: 132 MMOL/L (ref 135–147)
WBC # BLD AUTO: 7.4 THOUSAND/UL (ref 4.31–10.16)

## 2024-10-27 PROCEDURE — 0B978ZZ DRAINAGE OF LEFT MAIN BRONCHUS, VIA NATURAL OR ARTIFICIAL OPENING ENDOSCOPIC: ICD-10-PCS | Performed by: STUDENT IN AN ORGANIZED HEALTH CARE EDUCATION/TRAINING PROGRAM

## 2024-10-27 PROCEDURE — 94150 VITAL CAPACITY TEST: CPT

## 2024-10-27 PROCEDURE — 80053 COMPREHEN METABOLIC PANEL: CPT | Performed by: PHYSICIAN ASSISTANT

## 2024-10-27 PROCEDURE — 0B968ZZ DRAINAGE OF RIGHT LOWER LOBE BRONCHUS, VIA NATURAL OR ARTIFICIAL OPENING ENDOSCOPIC: ICD-10-PCS | Performed by: STUDENT IN AN ORGANIZED HEALTH CARE EDUCATION/TRAINING PROGRAM

## 2024-10-27 PROCEDURE — 0B958ZZ DRAINAGE OF RIGHT MIDDLE LOBE BRONCHUS, VIA NATURAL OR ARTIFICIAL OPENING ENDOSCOPIC: ICD-10-PCS | Performed by: STUDENT IN AN ORGANIZED HEALTH CARE EDUCATION/TRAINING PROGRAM

## 2024-10-27 PROCEDURE — 85027 COMPLETE CBC AUTOMATED: CPT | Performed by: PHYSICIAN ASSISTANT

## 2024-10-27 PROCEDURE — 0B9B8ZZ DRAINAGE OF LEFT LOWER LOBE BRONCHUS, VIA NATURAL OR ARTIFICIAL OPENING ENDOSCOPIC: ICD-10-PCS | Performed by: STUDENT IN AN ORGANIZED HEALTH CARE EDUCATION/TRAINING PROGRAM

## 2024-10-27 PROCEDURE — 94669 MECHANICAL CHEST WALL OSCILL: CPT

## 2024-10-27 PROCEDURE — 0B948ZZ DRAINAGE OF RIGHT UPPER LOBE BRONCHUS, VIA NATURAL OR ARTIFICIAL OPENING ENDOSCOPIC: ICD-10-PCS | Performed by: STUDENT IN AN ORGANIZED HEALTH CARE EDUCATION/TRAINING PROGRAM

## 2024-10-27 PROCEDURE — 94668 MNPJ CHEST WALL SBSQ: CPT

## 2024-10-27 PROCEDURE — 0B998ZZ DRAINAGE OF LINGULA BRONCHUS, VIA NATURAL OR ARTIFICIAL OPENING ENDOSCOPIC: ICD-10-PCS | Performed by: STUDENT IN AN ORGANIZED HEALTH CARE EDUCATION/TRAINING PROGRAM

## 2024-10-27 PROCEDURE — 31615 TRCHEOBRNCHSC EST TRACHS INC: CPT | Performed by: NURSE PRACTITIONER

## 2024-10-27 PROCEDURE — 84100 ASSAY OF PHOSPHORUS: CPT | Performed by: PHYSICIAN ASSISTANT

## 2024-10-27 PROCEDURE — 94003 VENT MGMT INPAT SUBQ DAY: CPT

## 2024-10-27 PROCEDURE — 71045 X-RAY EXAM CHEST 1 VIEW: CPT

## 2024-10-27 PROCEDURE — 31622 DX BRONCHOSCOPE/WASH: CPT | Performed by: NURSE PRACTITIONER

## 2024-10-27 PROCEDURE — 0B988ZZ DRAINAGE OF LEFT UPPER LOBE BRONCHUS, VIA NATURAL OR ARTIFICIAL OPENING ENDOSCOPIC: ICD-10-PCS | Performed by: STUDENT IN AN ORGANIZED HEALTH CARE EDUCATION/TRAINING PROGRAM

## 2024-10-27 PROCEDURE — 94640 AIRWAY INHALATION TREATMENT: CPT

## 2024-10-27 PROCEDURE — 82330 ASSAY OF CALCIUM: CPT | Performed by: PHYSICIAN ASSISTANT

## 2024-10-27 PROCEDURE — 0B938ZZ DRAINAGE OF RIGHT MAIN BRONCHUS, VIA NATURAL OR ARTIFICIAL OPENING ENDOSCOPIC: ICD-10-PCS | Performed by: STUDENT IN AN ORGANIZED HEALTH CARE EDUCATION/TRAINING PROGRAM

## 2024-10-27 PROCEDURE — 94760 N-INVAS EAR/PLS OXIMETRY 1: CPT

## 2024-10-27 PROCEDURE — 99291 CRITICAL CARE FIRST HOUR: CPT | Performed by: STUDENT IN AN ORGANIZED HEALTH CARE EDUCATION/TRAINING PROGRAM

## 2024-10-27 PROCEDURE — 83735 ASSAY OF MAGNESIUM: CPT | Performed by: PHYSICIAN ASSISTANT

## 2024-10-27 PROCEDURE — 31622 DX BRONCHOSCOPE/WASH: CPT | Performed by: STUDENT IN AN ORGANIZED HEALTH CARE EDUCATION/TRAINING PROGRAM

## 2024-10-27 PROCEDURE — 85007 BL SMEAR W/DIFF WBC COUNT: CPT | Performed by: PHYSICIAN ASSISTANT

## 2024-10-27 RX ORDER — MAGNESIUM SULFATE HEPTAHYDRATE 40 MG/ML
2 INJECTION, SOLUTION INTRAVENOUS ONCE
Status: COMPLETED | OUTPATIENT
Start: 2024-10-27 | End: 2024-10-27

## 2024-10-27 RX ORDER — ACETYLCYSTEINE 200 MG/ML
3 SOLUTION ORAL; RESPIRATORY (INHALATION)
Status: DISCONTINUED | OUTPATIENT
Start: 2024-10-27 | End: 2024-10-31 | Stop reason: HOSPADM

## 2024-10-27 RX ADMIN — CHLORHEXIDINE GLUCONATE 15 ML: 1.2 RINSE ORAL at 21:02

## 2024-10-27 RX ADMIN — CHLORHEXIDINE GLUCONATE 15 ML: 1.2 RINSE ORAL at 08:51

## 2024-10-27 RX ADMIN — POTASSIUM PHOSPHATE, MONOBASIC AND POTASSIUM PHOSPHATE, DIBASIC 12 MMOL: 224; 236 INJECTION, SOLUTION, CONCENTRATE INTRAVENOUS at 07:56

## 2024-10-27 RX ADMIN — LEVALBUTEROL HYDROCHLORIDE 1.25 MG: 1.25 SOLUTION RESPIRATORY (INHALATION) at 14:50

## 2024-10-27 RX ADMIN — LEVALBUTEROL HYDROCHLORIDE 1.25 MG: 1.25 SOLUTION RESPIRATORY (INHALATION) at 01:11

## 2024-10-27 RX ADMIN — APIXABAN 5 MG: 5 TABLET, FILM COATED ORAL at 08:51

## 2024-10-27 RX ADMIN — GUAIFENESIN 400 MG: 200 SOLUTION ORAL at 23:14

## 2024-10-27 RX ADMIN — LORAZEPAM 2 MG: 2 INJECTION INTRAMUSCULAR; INTRAVENOUS at 08:12

## 2024-10-27 RX ADMIN — CLOBETASOL PROPIONATE 1 APPLICATION: 0.5 CREAM TOPICAL at 08:02

## 2024-10-27 RX ADMIN — APIXABAN 5 MG: 5 TABLET, FILM COATED ORAL at 17:06

## 2024-10-27 RX ADMIN — ACETYLCYSTEINE 600 MG: 200 SOLUTION ORAL; RESPIRATORY (INHALATION) at 15:04

## 2024-10-27 RX ADMIN — Medication 37.5 G: at 15:33

## 2024-10-27 RX ADMIN — ACETYLCYSTEINE 600 MG: 200 SOLUTION ORAL; RESPIRATORY (INHALATION) at 07:48

## 2024-10-27 RX ADMIN — Medication 3 MG: at 21:02

## 2024-10-27 RX ADMIN — SODIUM CHLORIDE 1000 MG: 0.9 INJECTION, SOLUTION INTRAVENOUS at 08:51

## 2024-10-27 RX ADMIN — LORAZEPAM 2 MG: 2 INJECTION INTRAMUSCULAR; INTRAVENOUS at 02:00

## 2024-10-27 RX ADMIN — CLONAZEPAM 0.5 MG: 0.5 TABLET ORAL at 21:02

## 2024-10-27 RX ADMIN — GABAPENTIN 300 MG: 250 SOLUTION ORAL at 07:33

## 2024-10-27 RX ADMIN — FENTANYL CITRATE 50 MCG: 50 INJECTION INTRAMUSCULAR; INTRAVENOUS at 10:29

## 2024-10-27 RX ADMIN — LEVALBUTEROL HYDROCHLORIDE 1.25 MG: 1.25 SOLUTION RESPIRATORY (INHALATION) at 19:44

## 2024-10-27 RX ADMIN — GABAPENTIN 300 MG: 250 SOLUTION ORAL at 23:14

## 2024-10-27 RX ADMIN — CLOBETASOL PROPIONATE: 0.5 CREAM TOPICAL at 17:05

## 2024-10-27 RX ADMIN — LEVALBUTEROL HYDROCHLORIDE 1.25 MG: 1.25 SOLUTION RESPIRATORY (INHALATION) at 07:48

## 2024-10-27 RX ADMIN — ACETYLCYSTEINE 600 MG: 200 SOLUTION ORAL; RESPIRATORY (INHALATION) at 19:44

## 2024-10-27 RX ADMIN — GUAIFENESIN 400 MG: 200 SOLUTION ORAL at 04:01

## 2024-10-27 RX ADMIN — MAGNESIUM SULFATE HEPTAHYDRATE 2 G: 40 INJECTION, SOLUTION INTRAVENOUS at 07:29

## 2024-10-27 RX ADMIN — GUAIFENESIN 400 MG: 200 SOLUTION ORAL at 10:43

## 2024-10-27 RX ADMIN — LORAZEPAM 2 MG: 2 INJECTION INTRAMUSCULAR; INTRAVENOUS at 12:51

## 2024-10-27 RX ADMIN — GUAIFENESIN 400 MG: 200 SOLUTION ORAL at 17:04

## 2024-10-27 RX ADMIN — GABAPENTIN 300 MG: 250 SOLUTION ORAL at 15:37

## 2024-10-27 NOTE — PLAN OF CARE
Problem: Prexisting or High Potential for Compromised Skin Integrity  Goal: Skin integrity is maintained or improved  Description: INTERVENTIONS:  - Identify patients at risk for skin breakdown  - Assess and monitor skin integrity  - Assess and monitor nutrition and hydration status  - Monitor labs   - Assess for incontinence   - Turn and reposition patient  - Assist with mobility/ambulation  - Relieve pressure over bony prominences  - Avoid friction and shearing  - Provide appropriate hygiene as needed including keeping skin clean and dry  - Evaluate need for skin moisturizer/barrier cream  - Collaborate with interdisciplinary team   - Patient/family teaching  - Consider wound care consult   Outcome: Progressing     Problem: PAIN - ADULT  Goal: Verbalizes/displays adequate comfort level or baseline comfort level  Description: Interventions:  - Encourage patient to monitor pain and request assistance  - Assess pain using appropriate pain scale  - Administer analgesics based on type and severity of pain and evaluate response  - Implement non-pharmacological measures as appropriate and evaluate response  - Consider cultural and social influences on pain and pain management  - Notify physician/advanced practitioner if interventions unsuccessful or patient reports new pain  Outcome: Progressing     Problem: INFECTION - ADULT  Goal: Absence or prevention of progression during hospitalization  Description: INTERVENTIONS:  - Assess and monitor for signs and symptoms of infection  - Monitor lab/diagnostic results  - Monitor all insertion sites, i.e. indwelling lines, tubes, and drains  - Monitor endotracheal if appropriate and nasal secretions for changes in amount and color  - Bruno appropriate cooling/warming therapies per order  - Administer medications as ordered  - Instruct and encourage patient and family to use good hand hygiene technique  - Identify and instruct in appropriate isolation precautions for  identified infection/condition  Outcome: Progressing  Goal: Absence of fever/infection during neutropenic period  Description: INTERVENTIONS:  - Monitor WBC    Outcome: Progressing     Problem: SAFETY ADULT  Goal: Patient will remain free of falls  Description: INTERVENTIONS:  - Educate patient/family on patient safety including physical limitations  - Instruct patient to call for assistance with activity   - Consult OT/PT to assist with strengthening/mobility   - Keep Call bell within reach  - Keep bed low and locked with side rails adjusted as appropriate  - Keep care items and personal belongings within reach  - Initiate and maintain comfort rounds  - Make Fall Risk Sign visible to staff  - Apply yellow socks and bracelet for high fall risk patients  - Consider moving patient to room near nurses station  Outcome: Progressing  Goal: Maintain or return to baseline ADL function  Description: INTERVENTIONS:  -  Assess patient's ability to carry out ADLs; assess patient's baseline for ADL function and identify physical deficits which impact ability to perform ADLs (bathing, care of mouth/teeth, toileting, grooming, dressing, etc.)  - Assess/evaluate cause of self-care deficits   - Assess range of motion  - Assess patient's mobility; develop plan if impaired  - Assess patient's need for assistive devices and provide as appropriate  - Encourage maximum independence but intervene and supervise when necessary  - Involve family in performance of ADLs  - Assess for home care needs following discharge   - Consider OT consult to assist with ADL evaluation and planning for discharge  - Provide patient education as appropriate  Outcome: Progressing  Goal: Maintains/Returns to pre admission functional level  Description: INTERVENTIONS:  - Perform AM-PAC 6 Click Basic Mobility/ Daily Activity assessment daily.  - Set and communicate daily mobility goal to care team and patient/family/caregiver.   - Collaborate with rehabilitation  services on mobility goals if consulted  - Out of bed for toileting  - Record patient progress and toleration of activity level   Outcome: Progressing     Problem: DISCHARGE PLANNING  Goal: Discharge to home or other facility with appropriate resources  Description: INTERVENTIONS:  - Identify barriers to discharge w/patient and caregiver  - Arrange for needed discharge resources and transportation as appropriate  - Identify discharge learning needs (meds, wound care, etc.)  - Arrange for interpretive services to assist at discharge as needed  - Refer to Case Management Department for coordinating discharge planning if the patient needs post-hospital services based on physician/advanced practitioner order or complex needs related to functional status, cognitive ability, or social support system  Outcome: Progressing     Problem: Knowledge Deficit  Goal: Patient/family/caregiver demonstrates understanding of disease process, treatment plan, medications, and discharge instructions  Description: Complete learning assessment and assess knowledge base.  Interventions:  - Provide teaching at level of understanding  - Provide teaching via preferred learning methods  Outcome: Progressing     Problem: SAFETY,RESTRAINT: NV/NON-SELF DESTRUCTIVE BEHAVIOR  Goal: Remains free of harm/injury (restraint for non violent/non self-detsructive behavior)  Description: INTERVENTIONS:  - Instruct patient/family regarding restraint use   - Assess and monitor physiologic and psychological status   - Provide interventions and comfort measures to meet assessed patient needs   - Identify and implement measures to help patient regain control  - Assess readiness for release of restraint   Outcome: Progressing  Goal: Returns to optimal restraint-free functioning  Description: INTERVENTIONS:  - Assess the patient's behavior and symptoms that indicate continued need for restraint  - Identify and implement measures to help patient regain control  -  Assess readiness for release of restraint   Outcome: Progressing     Problem: Nutrition/Hydration-ADULT  Goal: Nutrient/Hydration intake appropriate for improving, restoring or maintaining nutritional needs  Description: Monitor and assess patient's nutrition/hydration status for malnutrition. Collaborate with interdisciplinary team and initiate plan and interventions as ordered.  Monitor patient's weight and dietary intake as ordered or per policy. Utilize nutrition screening tool and intervene as necessary. Determine patient's food preferences and provide high-protein, high-caloric foods as appropriate.     INTERVENTIONS:  - Monitor oral intake, urinary output, labs, and treatment plans  - Assess nutrition and hydration status and recommend course of action  - Evaluate amount of meals eaten  - Assist patient with eating if necessary   - Allow adequate time for meals  - Recommend/ encourage appropriate diets, oral nutritional supplements, and vitamin/mineral supplements  - Order, calculate, and assess calorie counts as needed  - Recommend, monitor, and adjust tube feedings and TPN/PPN based on assessed needs  - Assess need for intravenous fluids  - Provide specific nutrition/hydration education as appropriate  - Include patient/family/caregiver in decisions related to nutrition  Outcome: Progressing

## 2024-10-27 NOTE — ASSESSMENT & PLAN NOTE
Intermittent periods of agitation, restless however alert oriented x 4 without loss of consciousness over the past few days  MRI on 10/16 with hypoxic brain injury findings  MRI C-Spine on 10/23: Findings favor nonspecific transverse myelitis; no sign of acuity due to lack of cord expansion. Unable to rule out ramifications from prior ischemia.  Goals of care discussions were currently continues with disease treatment palliative care has been follow now signed off as goals of care are clear.  Neurology following recommendations to repeat paraneoplastic workup and an MRI in about a month  [id number]: Culture/stain 2+ poly, 2+ gram-positive rods, 1+ gram-negative rods growing.    Copper 96 ug/dL                                   CSF Gram stain +1 CSF fluid to is clear glucose of 54  Plan  Continue monitor mental status  Per family patient is at baseline events has been persistent over the past year  Regards to restless agitation bearing-down has responded to gabapentin now at 300 TID and Ativan as needed; episodes have reduced intensity   Continue monitoring metabolic derangement  Completed 10-day course of antibiotics for Pseudomonas and Proteus mirabilis pneumonia  Monitor off of antibiotics   Transverse myelitis w/u ordered: Serum NMO IgG autoantibodies, copper level, Sjogren's antibodies, vitamin B12, folate, vitamin B1, RPR, Lyme, vitamin D 25; largely WNL exception Vit. D   LP completed f/u pending results; STAT Gram stain showing 1+ mononuclear cells, ME panel negative   Detail Level: Zone

## 2024-10-27 NOTE — PROGRESS NOTES
Progress Note - Critical Care/ICU   Name: Hemanth Swanson 39 y.o. male I MRN: [id number]  Unit/Bed#: ICU 05 I Date of Admission: 10/5/2024   Date of Service: 10/27/2024 I Hospital Day: 22       Assessment & Plan  Toxic metabolic encephalopathy  Intermittent periods of agitation, restless however alert oriented x 4 without loss of consciousness over the past few days  MRI on 10/16 with hypoxic brain injury findings  MRI C-Spine on 10/23: Findings favor nonspecific transverse myelitis; no sign of acuity due to lack of cord expansion. Unable to rule out ramifications from prior ischemia.  Goals of care discussions were currently continues with disease treatment palliative care has been follow now signed off as goals of care are clear.  Neurology following recommendations to repeat paraneoplastic workup and an MRI in about a month  [id number]: Culture/stain 2+ poly, 2+ gram-positive rods, 1+ gram-negative rods growing.    Copper 96 ug/dL                                   CSF Gram stain +1 CSF fluid to is clear glucose of 54  Plan  Continue monitor mental status  Per family patient is at baseline events has been persistent over the past year  Regards to restless agitation bearing-down has responded to gabapentin now at 300 TID and Ativan as needed; episodes have reduced intensity   Continue monitoring metabolic derangement  Completed 10-day course of antibiotics for Pseudomonas and Proteus mirabilis pneumonia  Monitor off of antibiotics   Transverse myelitis w/u ordered: Serum NMO IgG autoantibodies, copper level, Sjogren's antibodies, vitamin B12, folate, vitamin B1, RPR, Lyme, vitamin D 25; largely WNL exception Vit. D   LP completed f/u pending results; STAT Gram stain showing 1+ mononuclear cells, ME panel negative  Chronic respiratory failure with hypoxia and hypercapnia (HCC)  Neuromuscular disease post chemotherapyS/P tracheostomy in 9/2023.  He continues to fail SBT likely in the setting of diaphragmatic  weakness in the setting of progressive demyelinating/ neuromuscular disease  Goals of care discussions continues disease driven  X-ray showed worsening opacities 10/21  Multiple bronchoscopies resulted thick mucus secretions  Sputum/bronc culture showing growth of Pseudomonas (s/p 10 days treatment for Pseudomonas/Proteus)  1026 on BiPAP satting 100% PEEP of 8  Patient is drowsy    Plan:  Continue vent support  Will monitor lethargy while on BiPaP   Xopnex and hypertonic saline nebs 3 times daily  Re-started chest therapy (previously halted due to concern they were precipitating vasovagal episodes)  Consider bronchoscopy for symptomatic relief as needed    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HCC)  Noted with abnormal MRI concerning for hypoxic injury  Neurology on board recommendations to repeat paraneoplastic workup repeat MRI in about a month  MRI-C Spine: Transverse myelitis not acute no cord expansion versus sequelae from old ischemia.    Plan  Continue Solu-Medrol today is day 3 for 5 days   Continue IgG today is day 3  Bradycardia  Intermittent bradycardia not related to hypoxia.   He has been maintaining heart rates above 60 since medication adjustments.  Seroquel discontinue  Heart rate between episodes fluctuates from low 80s high 90s.  Continues to experience desaturations/bradycardic episodes likely vasovagal in nature  Removed scheduled benzodiazepines and opioids.  Episodes continue but have decreased in intensity     Plan  -Currently treating with Ativan as needed and manual bagging/suction   -Increased gabapentin to 300 mg 3 times daily  Sepsis (HCC)  Sepsis likely secondary to multifocal pneumonia in the setting of Proteus and Pseudomonas day 10 on antibiotics today for completion.   Now shock undifferentiated consider hypovolemic in the setting of insensible losses, echocardiogram has been remained unremarkable doubt sepsis at this time given adequate treatment on antibiotics for 10 days  Blood pressure remained  with maps above 70 overnight.  He did not require pressors.  Will consider POCUS disease continuing evaluate IVC urine output has slowed down    Plan  If pressures are low, will trial of 500 cc Isolyte with monitoring urinary output.   Seizure-like activity (HCC)  No evidence of seizure on EMU on multiple admissions including current one  Seizure-like activity is likely stereotypical behaviors likely related to agitation; responding well to Ativan as needed  Umbilical hernia without obstruction and without gangrene  Evaluated by surgery.  Found to not be a surgical candidate due to ongoing comorbidities.  Fat containing incarcerated hernia without bowel.  Palliative care by specialist  Palliative care is following.  Had long discussion with family about goals of care.  They would like to continue all measures.  The are willing to care for him at home.  They reiterated their wishes that he would not like to be sent to a rehab facility.  Goals of care, counseling/discussion  Goals of care discussions multiple admissions including current 1 palliative care follow-up continues with disease driven treatments  Discussed with mother, sister patient unfortunately continues with poor prognosis  Patient has 24/7 support at home  Case management following assistance upon discharge  Rash of back  Patient has a persistent rash on his left back.  Does not cross the midline but expands across several dermatomes.  Rash is papular/pustule and crusted in places.  Rash was first noted over a week ago on this admission but patient's sister endorses rash being present over the course of a year.  They have tried over-the-counter creams for relief with no significant improvement.      Plan  Dermatology consulted, on board  2 punch biopsies were obtained cervical left shoulder  Will follow-up results  Temovate applied twice daily for no more than 2 weeks  Disposition: Critical care    ICU Core Measures     Vented Patient  VAP Bundle  VAP  bundle ordered     A: Assess, Prevent, and Manage Pain Has pain been assessed? Yes  Need for changes to pain regimen? No   B: Both Spontaneous Awakening Trials (SATs) and Spontaneous Breathing Trials (SBTs) Plan to perform spontaneous awakening trial today? N/A   Plan to perform spontaneous breathing trial today? N/A   Obvious barriers to extubation? Yes   C: Choice of Sedation RASS Goal: 0 Alert and Calm  Need for changes to sedation or analgesia regimen? No   D: Delirium CAM-ICU: Negative   E: Early Mobility  Plan for early mobility? Yes   F: Family Engagement Plan for family engagement today? Yes       Review of Invasive Devices:      Central access plan: Medications requiring central line      Prophylaxis:  VTE VTE covered by:  apixaban, Per PEG Tube, 5 mg at 10/26/24 4797       Stress Ulcer  not ordered         24 Hour Events : Vasovagal episodes continue but respond well to bagging and verbal encouragement.   Subjective Patient is calm, following commands and denying pain.       Objective :                   Vitals I/O      Most Recent Min/Max in 24hrs   Temp 99.3 °F (37.4 °C) Temp  Min: 97.3 °F (36.3 °C)  Max: 99.7 °F (37.6 °C)   Pulse 62 Pulse  Min: 54  Max: 88   Resp 14 Resp  Min: 14  Max: 14   /98 BP  Min: 100/64  Max: 155/104   O2 Sat 100 % SpO2  Min: 89 %  Max: 100 %      Intake/Output Summary (Last 24 hours) at 10/27/2024 0759  Last data filed at 10/27/2024 0537  Gross per 24 hour   Intake 3179 ml   Output 800 ml   Net 2379 ml       Diet Enteral/Parenteral; Tube Feeding No Oral Diet; Jevity 1.5; Continuous; 70; 200; Water; Every 4 hours    Invasive Monitoring           Physical Exam   Physical Exam  Eyes:      General: No foreign body in eyeNo scleral icterus.        Right eye: No discharge.         Left eye: No discharge.      Extraocular Movements: Extraocular movements intact.      Conjunctiva/sclera: Conjunctivae normal.   Skin:     Findings: Rash and wound present.   HENT:      Head:  Normocephalic.      Nose: No congestion or rhinorrhea.   Neck:      Trachea: Tracheostomy present.   Cardiovascular:      Rate and Rhythm: Normal rate and regular rhythm.      Heart sounds: Normal heart sounds.   Musculoskeletal:         General: Swelling present.      Right lower leg: Trace Edema present.      Left lower leg: Trace Edema present.   Constitutional:       General: He is not in acute distress.     Appearance: He is well-developed.      Interventions: He is intubated and restrained (RUE for safety).   Pulmonary:      Effort: No respiratory distress. He is intubated.      Breath sounds: No wheezing or rhonchi.   Neurological:      Mental Status: He is alert. He is calm. He is CAM ICU negative.      Cranial Nerves: No facial asymmetry.   Genitourinary/Anorectal:  external catheter present.        Diagnostic Studies        Lab Results: I have reviewed the following results:     Medications:  Scheduled PRN   acetylcysteine, 3 mL, Q8H  apixaban, 5 mg, BID  chlorhexidine, 15 mL, Q12H ISAEL  clobetasol, , BID  clonazePAM, 0.5 mg, HS  gabapentin, 300 mg, Q8H  guaiFENesin, 400 mg, Q6H  immune globulin, human, 400 mg/kg (Adjusted), Q24H  levalbuterol, 1.25 mg, Q6H  magnesium sulfate, 2 g, Once  melatonin, 3 mg, HS  methylPREDNISolone sodium succinate, 1,000 mg, Daily  potassium phosphate, 12 mmol, Once      acetaminophen, 650 mg, Q6H PRN  bisacodyl, 10 mg, Daily PRN  fentaNYL, 50 mcg, Q2H PRN  LORazepam, 2 mg, Q4H PRN  oxyCODONE, 5 mg, Q6H PRN       Continuous          Labs:   CBC    Recent Labs     10/26/24  0425 10/27/24  0406   WBC 5.10 7.40   HGB 10.4* 9.7*   HCT 32.2* 31.3*    185     BMP    Recent Labs     10/26/24  0425 10/27/24  0406   SODIUM 134* 132*   K 4.1 3.8   CL 96 96   CO2 33* 32   AGAP 5 4   BUN 14 19   CREATININE 0.50* 0.44*   CALCIUM 8.7 8.6       Coags    No recent results     Additional Electrolytes  Recent Labs     10/26/24  0425 10/27/24  0406   MG 1.9 1.9   PHOS 2.8 1.9*   CAIONIZED   --  1.10*          Blood Gas    No recent results  No recent results LFTs  Recent Labs     10/27/24  0406   ALT 55*   AST 23   ALKPHOS 83   ALB 2.9*   TBILI 0.53       Infectious  No recent results  Glucose  Recent Labs     10/26/24  0425 10/27/24  0406   GLUC 165* 160*

## 2024-10-27 NOTE — RESPIRATORY THERAPY NOTE
Assisted in bedside bronch. Instilled 2x 10 cc NSS. No samples drawn. Pt bronched to clean out the lung

## 2024-10-27 NOTE — PROCEDURES
Bronchoscopy (Bedside)    Date/Time: 10/27/2024 5:39 PM    Performed by: AMBER Cruz  Authorized by: AMBER Cruz    Patient location:  Bedside  Other Assisting Provider: Yes (comment) (Dr. Barksdale)    Consent:     Consent obtained:  Emergent situation  Universal protocol:     Relevant documents present and verified: yes      Test results available and properly labeled: yes      Radiology Images displayed and confirmed.  If images not available, report reviewed: yes      Required blood products, implants, devices and special equipment available: yes      Site/side marked: yes      Immediately prior to procedure a time out was called: yes      Patient identity confirmed:  Anonymous protocol, patient vented/unresponsive  Indications:     Procedure Purpose: therapeutic      Indications: other (comment)      Indications comment:  Copious sputum production  Sedation:     Sedation type:  Continuous (ICU/vent)  Upper Airway:     Faith:  Normal  Procedure details:     Description:  Mucous suctioned from R mainstem, bronchoscope advanced into RUL, RML, RLL which was irrigated for mucous clearance. Mucous was then cleared from JOSÉ MANUEL, Lingula, and LLL.   Post-procedure details:     Patient tolerance of procedure:  Tolerated well, no immediate complications

## 2024-10-27 NOTE — ASSESSMENT & PLAN NOTE
Noted with abnormal MRI concerning for hypoxic injury  Neurology on board recommendations to repeat paraneoplastic workup repeat MRI in about a month  MRI-C Spine: Transverse myelitis not acute no cord expansion versus sequelae from old ischemia.    Plan  Continue Solu-Medrol today is day 3 for 5 days   Continue IgG today is day 3

## 2024-10-28 LAB
ALBUMIN SERPL BCG-MCNC: 2.7 G/DL (ref 3.5–5)
ALP SERPL-CCNC: 76 U/L (ref 34–104)
ALT SERPL W P-5'-P-CCNC: 88 U/L (ref 7–52)
ANION GAP SERPL CALCULATED.3IONS-SCNC: -1 MMOL/L (ref 4–13)
AQP4 H2O CHANNEL AB SERPL IA-ACNC: <1.5 U/ML (ref 0–3)
AST SERPL W P-5'-P-CCNC: 45 U/L (ref 13–39)
BASOPHILS # BLD AUTO: 0 THOUSANDS/ΜL (ref 0–0.1)
BASOPHILS NFR BLD AUTO: 0 % (ref 0–1)
BILIRUB SERPL-MCNC: 0.56 MG/DL (ref 0.2–1)
BUN SERPL-MCNC: 20 MG/DL (ref 5–25)
CALCIUM ALBUM COR SERPL-MCNC: 9.4 MG/DL (ref 8.3–10.1)
CALCIUM SERPL-MCNC: 8.4 MG/DL (ref 8.4–10.2)
CHLORIDE SERPL-SCNC: 96 MMOL/L (ref 96–108)
CO2 SERPL-SCNC: 38 MMOL/L (ref 21–32)
CREAT SERPL-MCNC: 0.44 MG/DL (ref 0.6–1.3)
EOSINOPHIL # BLD AUTO: 0 THOUSAND/ΜL (ref 0–0.61)
EOSINOPHIL NFR BLD AUTO: 0 % (ref 0–6)
ERYTHROCYTE [DISTWIDTH] IN BLOOD BY AUTOMATED COUNT: 17.7 % (ref 11.6–15.1)
GFR SERPL CREATININE-BSD FRML MDRD: 145 ML/MIN/1.73SQ M
GLUCOSE SERPL-MCNC: 132 MG/DL (ref 65–140)
HCT VFR BLD AUTO: 30.6 % (ref 36.5–49.3)
HGB BLD-MCNC: 9.6 G/DL (ref 12–17)
IMM GRANULOCYTES # BLD AUTO: 0.08 THOUSAND/UL (ref 0–0.2)
IMM GRANULOCYTES NFR BLD AUTO: 1 % (ref 0–2)
LYMPHOCYTES # BLD AUTO: 0.22 THOUSANDS/ΜL (ref 0.6–4.47)
LYMPHOCYTES NFR BLD AUTO: 3 % (ref 14–44)
MAGNESIUM SERPL-MCNC: 2 MG/DL (ref 1.9–2.7)
MCH RBC QN AUTO: 30.5 PG (ref 26.8–34.3)
MCHC RBC AUTO-ENTMCNC: 31.4 G/DL (ref 31.4–37.4)
MCV RBC AUTO: 97 FL (ref 82–98)
MONOCYTES # BLD AUTO: 0.51 THOUSAND/ΜL (ref 0.17–1.22)
MONOCYTES NFR BLD AUTO: 7 % (ref 4–12)
NEUTROPHILS # BLD AUTO: 6.93 THOUSANDS/ΜL (ref 1.85–7.62)
NEUTS SEG NFR BLD AUTO: 89 % (ref 43–75)
NRBC BLD AUTO-RTO: 0 /100 WBCS
PHOSPHATE SERPL-MCNC: 1.9 MG/DL (ref 2.7–4.5)
PLATELET # BLD AUTO: 189 THOUSANDS/UL (ref 149–390)
PMV BLD AUTO: 10.3 FL (ref 8.9–12.7)
POTASSIUM SERPL-SCNC: 4.1 MMOL/L (ref 3.5–5.3)
PROT SERPL-MCNC: 7.3 G/DL (ref 6.4–8.4)
RBC # BLD AUTO: 3.15 MILLION/UL (ref 3.88–5.62)
SODIUM SERPL-SCNC: 133 MMOL/L (ref 135–147)
VIT B1 BLD-SCNC: 118.6 NMOL/L (ref 66.5–200)
WBC # BLD AUTO: 7.74 THOUSAND/UL (ref 4.31–10.16)

## 2024-10-28 PROCEDURE — 94760 N-INVAS EAR/PLS OXIMETRY 1: CPT

## 2024-10-28 PROCEDURE — 83735 ASSAY OF MAGNESIUM: CPT | Performed by: NURSE PRACTITIONER

## 2024-10-28 PROCEDURE — 80053 COMPREHEN METABOLIC PANEL: CPT | Performed by: NURSE PRACTITIONER

## 2024-10-28 PROCEDURE — 94003 VENT MGMT INPAT SUBQ DAY: CPT

## 2024-10-28 PROCEDURE — 94640 AIRWAY INHALATION TREATMENT: CPT

## 2024-10-28 PROCEDURE — 85025 COMPLETE CBC W/AUTO DIFF WBC: CPT | Performed by: NURSE PRACTITIONER

## 2024-10-28 PROCEDURE — 97763 ORTHC/PROSTC MGMT SBSQ ENC: CPT

## 2024-10-28 PROCEDURE — 84100 ASSAY OF PHOSPHORUS: CPT | Performed by: NURSE PRACTITIONER

## 2024-10-28 PROCEDURE — 94669 MECHANICAL CHEST WALL OSCILL: CPT

## 2024-10-28 PROCEDURE — 94150 VITAL CAPACITY TEST: CPT

## 2024-10-28 PROCEDURE — 94664 DEMO&/EVAL PT USE INHALER: CPT

## 2024-10-28 PROCEDURE — 99232 SBSQ HOSP IP/OBS MODERATE 35: CPT | Performed by: INTERNAL MEDICINE

## 2024-10-28 RX ORDER — GABAPENTIN 250 MG/5ML
200 SOLUTION ORAL EVERY 8 HOURS
Status: COMPLETED | OUTPATIENT
Start: 2024-10-28 | End: 2024-10-30

## 2024-10-28 RX ORDER — GABAPENTIN 250 MG/5ML
100 SOLUTION ORAL
Status: DISCONTINUED | OUTPATIENT
Start: 2024-11-03 | End: 2024-10-28

## 2024-10-28 RX ORDER — GABAPENTIN 250 MG/5ML
100 SOLUTION ORAL EVERY 8 HOURS
Status: DISCONTINUED | OUTPATIENT
Start: 2024-10-28 | End: 2024-10-28

## 2024-10-28 RX ORDER — GABAPENTIN 250 MG/5ML
100 SOLUTION ORAL 2 TIMES DAILY
Status: DISCONTINUED | OUTPATIENT
Start: 2024-10-31 | End: 2024-10-28

## 2024-10-28 RX ORDER — GABAPENTIN 250 MG/5ML
100 SOLUTION ORAL EVERY 8 HOURS
Status: DISCONTINUED | OUTPATIENT
Start: 2024-10-30 | End: 2024-10-31 | Stop reason: HOSPADM

## 2024-10-28 RX ORDER — GABAPENTIN 250 MG/5ML
100 SOLUTION ORAL
Status: DISCONTINUED | OUTPATIENT
Start: 2024-11-03 | End: 2024-10-31 | Stop reason: HOSPADM

## 2024-10-28 RX ORDER — GABAPENTIN 250 MG/5ML
100 SOLUTION ORAL EVERY 12 HOURS
Status: DISCONTINUED | OUTPATIENT
Start: 2024-11-01 | End: 2024-10-31 | Stop reason: HOSPADM

## 2024-10-28 RX ADMIN — GUAIFENESIN 400 MG: 200 SOLUTION ORAL at 04:53

## 2024-10-28 RX ADMIN — LEVALBUTEROL HYDROCHLORIDE 1.25 MG: 1.25 SOLUTION RESPIRATORY (INHALATION) at 13:17

## 2024-10-28 RX ADMIN — CHLORHEXIDINE GLUCONATE 15 ML: 1.2 RINSE ORAL at 08:03

## 2024-10-28 RX ADMIN — ACETYLCYSTEINE 600 MG: 200 SOLUTION ORAL; RESPIRATORY (INHALATION) at 20:02

## 2024-10-28 RX ADMIN — ACETYLCYSTEINE 600 MG: 200 SOLUTION ORAL; RESPIRATORY (INHALATION) at 07:25

## 2024-10-28 RX ADMIN — LORAZEPAM 2 MG: 2 INJECTION INTRAMUSCULAR; INTRAVENOUS at 20:00

## 2024-10-28 RX ADMIN — Medication 37.5 G: at 15:41

## 2024-10-28 RX ADMIN — LEVALBUTEROL HYDROCHLORIDE 1.25 MG: 1.25 SOLUTION RESPIRATORY (INHALATION) at 02:35

## 2024-10-28 RX ADMIN — LEVALBUTEROL HYDROCHLORIDE 1.25 MG: 1.25 SOLUTION RESPIRATORY (INHALATION) at 07:25

## 2024-10-28 RX ADMIN — CLOBETASOL PROPIONATE: 0.5 CREAM TOPICAL at 17:54

## 2024-10-28 RX ADMIN — GABAPENTIN 200 MG: 250 SOLUTION ORAL at 17:53

## 2024-10-28 RX ADMIN — GUAIFENESIN 400 MG: 200 SOLUTION ORAL at 17:51

## 2024-10-28 RX ADMIN — SODIUM PHOSPHATE, MONOBASIC, MONOHYDRATE AND SODIUM PHOSPHATE, DIBASIC, ANHYDROUS 30 MMOL: 142; 276 INJECTION, SOLUTION INTRAVENOUS at 10:08

## 2024-10-28 RX ADMIN — GUAIFENESIN 400 MG: 200 SOLUTION ORAL at 12:23

## 2024-10-28 RX ADMIN — LORAZEPAM 2 MG: 2 INJECTION INTRAMUSCULAR; INTRAVENOUS at 03:30

## 2024-10-28 RX ADMIN — LEVALBUTEROL HYDROCHLORIDE 1.25 MG: 1.25 SOLUTION RESPIRATORY (INHALATION) at 20:02

## 2024-10-28 RX ADMIN — ACETYLCYSTEINE 600 MG: 200 SOLUTION ORAL; RESPIRATORY (INHALATION) at 02:35

## 2024-10-28 RX ADMIN — APIXABAN 5 MG: 5 TABLET, FILM COATED ORAL at 17:51

## 2024-10-28 RX ADMIN — CHLORHEXIDINE GLUCONATE 15 ML: 1.2 RINSE ORAL at 21:35

## 2024-10-28 RX ADMIN — ACETYLCYSTEINE 600 MG: 200 SOLUTION ORAL; RESPIRATORY (INHALATION) at 13:17

## 2024-10-28 RX ADMIN — CLONAZEPAM 0.5 MG: 0.5 TABLET ORAL at 21:35

## 2024-10-28 RX ADMIN — SODIUM CHLORIDE 1000 MG: 0.9 INJECTION, SOLUTION INTRAVENOUS at 08:03

## 2024-10-28 RX ADMIN — GABAPENTIN 200 MG: 250 SOLUTION ORAL at 23:47

## 2024-10-28 RX ADMIN — GUAIFENESIN 400 MG: 200 SOLUTION ORAL at 23:47

## 2024-10-28 RX ADMIN — CLOBETASOL PROPIONATE: 0.5 CREAM TOPICAL at 08:04

## 2024-10-28 RX ADMIN — GABAPENTIN 300 MG: 250 SOLUTION ORAL at 08:03

## 2024-10-28 RX ADMIN — APIXABAN 5 MG: 5 TABLET, FILM COATED ORAL at 08:03

## 2024-10-28 RX ADMIN — Medication 3 MG: at 21:35

## 2024-10-28 NOTE — ASSESSMENT & PLAN NOTE
Noted with abnormal MRI concerning for hypoxic injury  Neurology on board recommendations to repeat paraneoplastic workup repeat MRI in about a month  MRI-C Spine: Transverse myelitis not acute no cord expansion versus sequelae from old ischemia.    Plan  Continue Solu-Medrol today is day 4 for 5 days   Continue IgG today is day 4

## 2024-10-28 NOTE — ASSESSMENT & PLAN NOTE
Intermittent periods of agitation, restless however alert oriented x 4 without loss of consciousness over the past few days  MRI on 10/16 with hypoxic brain injury findings  MRI C-Spine on 10/23: Findings favor nonspecific transverse myelitis; no sign of acuity due to lack of cord expansion. Unable to rule out ramifications from prior ischemia.  Goals of care discussions result: continue with disease treatment palliative care has been follow now signed off as goals of care are clear.  Neurology following recommendations to repeat paraneoplastic workup and an MRI in about a month  [id number]: Culture/stain 2+ poly, 2+ gram-positive rods, 1+ gram-negative rods growing.    Copper 96 ug/dL                                   CSF Gram stain +1 CSF fluid to is clear glucose of 54  Plan  Continue monitor mental status  Per family patient is at baseline events has been persistent over the past year  Regards to restless agitation bearing-down has responded to gabapentin now at 300 TID and Ativan as needed; episodes have reduced intensity   Continue monitoring metabolic derangement  Completed 10-day course of antibiotics for Pseudomonas and Proteus mirabilis pneumonia  Monitor off of antibiotics   Transverse myelitis w/u ordered: Serum NMO IgG autoantibodies, copper level, Sjogren's antibodies, vitamin B12, folate, vitamin B1, RPR, Lyme, vitamin D 25; largely WNL exception Vit. D0 MS panel and MOG still pending.   LP completed f/u pending results; STAT Gram stain showing 1+ mononuclear cells, ME panel negative

## 2024-10-28 NOTE — PLAN OF CARE
Problem: Prexisting or High Potential for Compromised Skin Integrity  Goal: Skin integrity is maintained or improved  Description: INTERVENTIONS:  - Identify patients at risk for skin breakdown  - Assess and monitor skin integrity  - Assess and monitor nutrition and hydration status  - Monitor labs   - Assess for incontinence   - Turn and reposition patient  - Assist with mobility/ambulation  - Relieve pressure over bony prominences  - Avoid friction and shearing  - Provide appropriate hygiene as needed including keeping skin clean and dry  - Evaluate need for skin moisturizer/barrier cream  - Collaborate with interdisciplinary team   - Patient/family teaching  - Consider wound care consult   Outcome: Progressing     Problem: PAIN - ADULT  Goal: Verbalizes/displays adequate comfort level or baseline comfort level  Description: Interventions:  - Encourage patient to monitor pain and request assistance  - Assess pain using appropriate pain scale  - Administer analgesics based on type and severity of pain and evaluate response  - Implement non-pharmacological measures as appropriate and evaluate response  - Consider cultural and social influences on pain and pain management  - Notify physician/advanced practitioner if interventions unsuccessful or patient reports new pain  Outcome: Progressing     Problem: INFECTION - ADULT  Goal: Absence or prevention of progression during hospitalization  Description: INTERVENTIONS:  - Assess and monitor for signs and symptoms of infection  - Monitor lab/diagnostic results  - Monitor all insertion sites, i.e. indwelling lines, tubes, and drains  - Monitor endotracheal if appropriate and nasal secretions for changes in amount and color  - Bentley appropriate cooling/warming therapies per order  - Administer medications as ordered  - Instruct and encourage patient and family to use good hand hygiene technique  - Identify and instruct in appropriate isolation precautions for  identified infection/condition  Outcome: Progressing  Goal: Absence of fever/infection during neutropenic period  Description: INTERVENTIONS:  - Monitor WBC    Outcome: Progressing     Problem: SAFETY ADULT  Goal: Patient will remain free of falls  Description: INTERVENTIONS:  - Educate patient/family on patient safety including physical limitations  - Instruct patient to call for assistance with activity   - Consult OT/PT to assist with strengthening/mobility   - Keep Call bell within reach  - Keep bed low and locked with side rails adjusted as appropriate  - Keep care items and personal belongings within reach  - Initiate and maintain comfort rounds  - Make Fall Risk Sign visible to staff  - Offer Toileting every  Hours, in advance of need  - Initiate/Maintain alarm  - Obtain necessary fall risk management equipment:   - Apply yellow socks and bracelet for high fall risk patients  - Consider moving patient to room near nurses station  Outcome: Progressing  Goal: Maintain or return to baseline ADL function  Description: INTERVENTIONS:  -  Assess patient's ability to carry out ADLs; assess patient's baseline for ADL function and identify physical deficits which impact ability to perform ADLs (bathing, care of mouth/teeth, toileting, grooming, dressing, etc.)  - Assess/evaluate cause of self-care deficits   - Assess range of motion  - Assess patient's mobility; develop plan if impaired  - Assess patient's need for assistive devices and provide as appropriate  - Encourage maximum independence but intervene and supervise when necessary  - Involve family in performance of ADLs  - Assess for home care needs following discharge   - Consider OT consult to assist with ADL evaluation and planning for discharge  - Provide patient education as appropriate  Outcome: Progressing  Goal: Maintains/Returns to pre admission functional level  Description: INTERVENTIONS:  - Perform AM-PAC 6 Click Basic Mobility/ Daily Activity  assessment daily.  - Set and communicate daily mobility goal to care team and patient/family/caregiver.   - Collaborate with rehabilitation services on mobility goals if consulted  - Perform Range of Motion  times a day.  - Reposition patient every  hours.  - Dangle patient  times a day  - Stand patient  times a day  - Ambulate patient  times a day  - Out of bed to chair  times a day   - Out of bed for meals  times a day  - Out of bed for toileting  - Record patient progress and toleration of activity level   Outcome: Progressing     Problem: DISCHARGE PLANNING  Goal: Discharge to home or other facility with appropriate resources  Description: INTERVENTIONS:  - Identify barriers to discharge w/patient and caregiver  - Arrange for needed discharge resources and transportation as appropriate  - Identify discharge learning needs (meds, wound care, etc.)  - Arrange for interpretive services to assist at discharge as needed  - Refer to Case Management Department for coordinating discharge planning if the patient needs post-hospital services based on physician/advanced practitioner order or complex needs related to functional status, cognitive ability, or social support system  Outcome: Progressing     Problem: Nutrition/Hydration-ADULT  Goal: Nutrient/Hydration intake appropriate for improving, restoring or maintaining nutritional needs  Description: Monitor and assess patient's nutrition/hydration status for malnutrition. Collaborate with interdisciplinary team and initiate plan and interventions as ordered.  Monitor patient's weight and dietary intake as ordered or per policy. Utilize nutrition screening tool and intervene as necessary. Determine patient's food preferences and provide high-protein, high-caloric foods as appropriate.     INTERVENTIONS:  - Monitor oral intake, urinary output, labs, and treatment plans  - Assess nutrition and hydration status and recommend course of action  - Evaluate amount of meals  eaten  - Assist patient with eating if necessary   - Allow adequate time for meals  - Recommend/ encourage appropriate diets, oral nutritional supplements, and vitamin/mineral supplements  - Order, calculate, and assess calorie counts as needed  - Recommend, monitor, and adjust tube feedings and TPN/PPN based on assessed needs  - Assess need for intravenous fluids  - Provide specific nutrition/hydration education as appropriate  - Include patient/family/caregiver in decisions related to nutrition  Outcome: Progressing     Problem: SAFETY,RESTRAINT: NV/NON-SELF DESTRUCTIVE BEHAVIOR  Goal: Remains free of harm/injury (restraint for non violent/non self-detsructive behavior)  Description: INTERVENTIONS:  - Instruct patient/family regarding restraint use   - Assess and monitor physiologic and psychological status   - Provide interventions and comfort measures to meet assessed patient needs   - Identify and implement measures to help patient regain control  - Assess readiness for release of restraint   Outcome: Progressing  Goal: Returns to optimal restraint-free functioning  Description: INTERVENTIONS:  - Assess the patient's behavior and symptoms that indicate continued need for restraint  - Identify and implement measures to help patient regain control  - Assess readiness for release of restraint   Outcome: Progressing

## 2024-10-28 NOTE — PHYSICAL THERAPY NOTE
PHYSICAL THERAPY ORTHOTIC NOTE          Patient Name: Hemanth Swanson  Today's Date: 10/28/2024           10/28/24 1527   PT Last Visit   PT Visit Date 10/28/24   Note Type   Note type Orthotic Management/Training   Assessment   Assessment Orthotic Fitting:   Time in: 1519  Time out: 1527  Total Time: 8 min    Orthotic Ordered: 2 multipodus boots  Orthotic Ordered by: Andrea Pearce MD    Objective: Upon arrival to room, pt supine in bed on trach + vent. Pt educated on mutlipodus boot fit and alignment w/ pt agreeable to boots (pt able to nod/shake head yes/no to questions consistently). Pt fit for boot on BLE while pt supine in bed. Pt requires total assistance for donning/doffing boot at this time, as well as for adjustment of boots as needed. Bilateral kick stands not engaged due to pt's bilateral hip external rotation positioning. Attempted to internally rotate pt's hips and reposition w/ wedges, but unsuccessful. Pt confirmed boots are comfortable (nodded head yes when asked). JEAN-PAUL Packer updated post and signed for boots.             Mile Maciel, PT, DPT  10/28/24

## 2024-10-28 NOTE — PROGRESS NOTES
Progress Note - Critical Care/ICU   Name: Hemanth Swanson 39 y.o. male I MRN: [id number]  Unit/Bed#: ICU 05 I Date of Admission: 10/5/2024   Date of Service: 10/28/2024 I Hospital Day: 23       Assessment & Plan  Toxic metabolic encephalopathy  Intermittent periods of agitation, restless however alert oriented x 4 without loss of consciousness over the past few days  MRI on 10/16 with hypoxic brain injury findings  MRI C-Spine on 10/23: Findings favor nonspecific transverse myelitis; no sign of acuity due to lack of cord expansion. Unable to rule out ramifications from prior ischemia.  Goals of care discussions result: continue with disease treatment palliative care has been follow now signed off as goals of care are clear.  Neurology following recommendations to repeat paraneoplastic workup and an MRI in about a month  [id number]: Culture/stain 2+ poly, 2+ gram-positive rods, 1+ gram-negative rods growing.    Copper 96 ug/dL                                   CSF Gram stain +1 CSF fluid to is clear glucose of 54  Plan  Continue monitor mental status  Per family patient is at baseline events has been persistent over the past year  Regards to restless agitation bearing-down has responded to gabapentin now at 300 TID and Ativan as needed; episodes have reduced intensity   Continue monitoring metabolic derangement  Completed 10-day course of antibiotics for Pseudomonas and Proteus mirabilis pneumonia  Monitor off of antibiotics   Transverse myelitis w/u ordered: Serum NMO IgG autoantibodies, copper level, Sjogren's antibodies, vitamin B12, folate, vitamin B1, RPR, Lyme, vitamin D 25; largely WNL exception Vit. D0 MS panel and MOG still pending.   LP completed f/u pending results; STAT Gram stain showing 1+ mononuclear cells, ME panel negative  Chronic respiratory failure with hypoxia and hypercapnia (HCC)  Neuromuscular disease post chemotherapyS/P tracheostomy in 9/2023.  He continues to fail SBT likely in the  setting of diaphragmatic weakness in the setting of progressive demyelinating/ neuromuscular disease  Goals of care discussions continues disease driven  X-ray showed worsening opacities 10/21  Multiple bronchoscopies resulted thick mucus secretions  Sputum/bronc culture showing growth of Pseudomonas (s/p 10 days treatment for Pseudomonas/Proteus)  1026 on BiPAP satting 100% PEEP of 8  Patient is drowsy    Plan:  Continue vent support  Will monitor lethargy while on BiPaP   Xopnex and hypertonic saline nebs 3 times daily  Re-started chest therapy (previously halted due to concern they were precipitating vasovagal episodes)  Consider bronchoscopy for symptomatic relief as needed    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HCC)  Noted with abnormal MRI concerning for hypoxic injury  Neurology on board recommendations to repeat paraneoplastic workup repeat MRI in about a month  MRI-C Spine: Transverse myelitis not acute no cord expansion versus sequelae from old ischemia.    Plan  Continue Solu-Medrol today is day 4 for 5 days   Continue IgG today is day 4  Bradycardia  Intermittent bradycardia not related to hypoxia.   He has been maintaining heart rates above 60 since medication adjustments.  Seroquel discontinue  Heart rate between episodes fluctuates from low 80s high 90s.  Continues to experience desaturations/bradycardic episodes likely vasovagal in nature  Removed scheduled benzodiazepines and opioids.  Episodes continue but have decreased in intensity     Plan  -Currently treating with Ativan as needed and manual bagging/suction   -Increased gabapentin to 300 mg 3 times daily  Sepsis (HCC)  Sepsis likely secondary to multifocal pneumonia in the setting of Proteus and Pseudomonas day 10 on antibiotics today for completion.   Now shock undifferentiated consider hypovolemic in the setting of insensible losses, echocardiogram has been remained unremarkable doubt sepsis at this time given adequate treatment on antibiotics for 10  days  Blood pressure remained with maps above 70 overnight.  He did not require pressors.  Will consider POCUS disease continuing evaluate IVC urine output has slowed down    Plan  If pressures are low, will trial of 500 cc Isolyte with monitoring urinary output.   Seizure-like activity (HCC)  No evidence of seizure on EMU on multiple admissions including current one  Seizure-like activity is likely stereotypical behaviors likely related to agitation; responding well to Ativan as needed  Umbilical hernia without obstruction and without gangrene  Evaluated by surgery.  Found to not be a surgical candidate due to ongoing comorbidities.  Fat containing incarcerated hernia without bowel.  Palliative care by specialist  Palliative care is following.  Had long discussion with family about goals of care.  They would like to continue all measures.  The are willing to care for him at home.  They reiterated their wishes that he would not like to be sent to a rehab facility.  Goals of care, counseling/discussion  Goals of care discussions multiple admissions including current 1 palliative care follow-up continues with disease driven treatments  Discussed with mother, sister patient unfortunately continues with poor prognosis  Patient has 24/7 support at home  Case management following assistance upon discharge   Rash of back  Patient has a persistent rash on his left back.  Does not cross the midline but expands across several dermatomes.  Rash is papular/pustule and crusted in places.  Rash was first noted over a week ago on this admission but patient's sister endorses rash being present over the course of a year.  They have tried over-the-counter creams for relief with no significant improvement.      Plan  Dermatology consulted, on board  2 punch biopsies were obtained cervical left shoulder  Will follow-up results  Temovate applied twice daily for no more than 2 weeks  Disposition: Critical care    ICU Core Measures      Vented Patient  VAP Bundle  VAP bundle ordered     A: Assess, Prevent, and Manage Pain Has pain been assessed? Yes  Need for changes to pain regimen? NA   B: Both Spontaneous Awakening Trials (SATs) and Spontaneous Breathing Trials (SBTs) Plan to perform spontaneous awakening trial today? N/A   Plan to perform spontaneous breathing trial today? N/A   Obvious barriers to extubation? Yes   C: Choice of Sedation RASS Goal: 0 Alert and Calm  Need for changes to sedation or analgesia regimen? No   D: Delirium CAM-ICU: Negative   E: Early Mobility  Plan for early mobility? Yes   F: Family Engagement Plan for family engagement today? Yes       Review of Invasive Devices:      Central access plan: Medications requiring central line      Prophylaxis:  VTE VTE covered by:  apixaban, Per PEG Tube, 5 mg at 10/27/24 1706       Stress Ulcer  not ordered         24 Hour Events : Vasovagal episodes continue but no longer require bagging are responsive to verbal breathing que.     Subjective Patient is alert, calm, and oriented. He responds to all commands and is able to shake head yes/no.         Objective :                   Vitals I/O      Most Recent Min/Max in 24hrs   Temp 99.9 °F (37.7 °C) Temp  Min: 97.7 °F (36.5 °C)  Max: 100 °F (37.8 °C)   Pulse 67 Pulse  Min: 54  Max: 81   Resp 14 Resp  Min: 14  Max: 14   /77 BP  Min: 102/62  Max: 150/73   O2 Sat 93 % SpO2  Min: 89 %  Max: 100 %      Intake/Output Summary (Last 24 hours) at 10/28/2024 0641  Last data filed at 10/28/2024 0600  Gross per 24 hour   Intake 3428 ml   Output 2000 ml   Net 1428 ml       Diet Enteral/Parenteral; Tube Feeding No Oral Diet; Jevity 1.5; Continuous; 70; 200; Water; Every 4 hours    Invasive Monitoring           Physical Exam   Physical Exam  Eyes:      General: No foreign body in eyeNo scleral icterus.        Right eye: No discharge.         Left eye: No discharge.      Extraocular Movements: Extraocular movements intact.       Conjunctiva/sclera: Conjunctivae normal.   Skin:     Findings: Rash and wound present.   HENT:      Head: Normocephalic and atraumatic.      Mouth/Throat:      Mouth: Mucous membranes are moist.   Neck:      Trachea: Tracheostomy present.   Cardiovascular:      Rate and Rhythm: Normal rate and regular rhythm.   Musculoskeletal:         General: Swelling present.      Right lower leg: Trace Edema present.      Left lower leg: Trace Edema present.   Abdominal:      Tenderness: There is no abdominal tenderness.      Hernia: A hernia is present.   Constitutional:       Appearance: He is well-developed. He is ill-appearing.      Interventions: He is intubated and restrained.   Pulmonary:      Effort: No accessory muscle usage, respiratory distress or accessory muscle usage. He is intubated.      Breath sounds: Rhonchi present.   Neurological:      Mental Status: He is alert. Mental status is at baseline. He is calm.      Cranial Nerves: No facial asymmetry.      Motor: Motor deficit.   Genitourinary/Anorectal:  external catheter present.        Diagnostic Studies        Lab Results: I have reviewed the following results:     Medications:  Scheduled PRN   acetylcysteine, 3 mL, Q6H  apixaban, 5 mg, BID  chlorhexidine, 15 mL, Q12H ISAEL  clobetasol, , BID  clonazePAM, 0.5 mg, HS  gabapentin, 300 mg, Q8H  guaiFENesin, 400 mg, Q6H  immune globulin, human, 400 mg/kg (Adjusted), Q24H  levalbuterol, 1.25 mg, Q6H  melatonin, 3 mg, HS  methylPREDNISolone sodium succinate, 1,000 mg, Daily      acetaminophen, 650 mg, Q6H PRN  bisacodyl, 10 mg, Daily PRN  fentaNYL, 50 mcg, Q2H PRN  LORazepam, 2 mg, Q4H PRN  oxyCODONE, 5 mg, Q6H PRN       Continuous          Labs:   CBC    Recent Labs     10/27/24  0406 10/28/24  0452   WBC 7.40 7.74   HGB 9.7* 9.6*   HCT 31.3* 30.6*    189     BMP    Recent Labs     10/27/24  0406 10/28/24  0452   SODIUM 132* 133*   K 3.8 4.1   CL 96 96   CO2 32 38*   AGAP 4 -1*   BUN 19 20   CREATININE 0.44*  0.44*   CALCIUM 8.6 8.4       Coags    No recent results     Additional Electrolytes  Recent Labs     10/27/24  0406 10/28/24  0452   MG 1.9 2.0   PHOS 1.9* 1.9*   CAIONIZED 1.10*  --           Blood Gas    No recent results  No recent results LFTs  Recent Labs     10/27/24  0406 10/28/24  0452   ALT 55* 88*   AST 23 45*   ALKPHOS 83 76   ALB 2.9* 2.7*   TBILI 0.53 0.56       Infectious  No recent results  Glucose  Recent Labs     10/27/24  0406 10/28/24  0452   GLUC 160* 132

## 2024-10-28 NOTE — NUTRITION
Recommend to continue tube feeds of Jevity 1.5 @ 70 ml/hr X 24 hrs via pump. Water flushes of 150 ml q 4 hrs.      At goal EN regimen provides 1680 ml total volume, 2520 kcals, 107 g protein, and 2177 ml total water from formula + flushes.

## 2024-10-28 NOTE — CASE MANAGEMENT
Case Management Progress Note    Patient name Hemanth Swanson  Location ICU 05/ICU 05 MRN [id number]  : 1985 Date 10/28/2024       LOS (days): 23  Geometric Mean LOS (GMLOS) (days):   Days to GMLOS:        OBJECTIVE:        Current admission status: Inpatient  Preferred Pharmacy:   [facility]/pharmacy #[id number] - [facility] [address]  [address] [address]  Phone: [phone] Fax: [phone]    Primary Care Provider: Ela Douglas MD    Primary Insurance: [facility]  Secondary Insurance:     PROGRESS NOTE:      RISHABH spoke with Hilario with [employer] - He was notified of potential new script for pt's home VENT. Aware CM will send script once available. Hilario also plans to send script/information regarding chest compression vest.     RISHABH received a return call from Suzan with [employer]. She reports a new order will be needed for tube feeds. Suzan reports to add note to order that pt does have a pump at home. Suzan confirmed their team will set up the pump settings once delivery is made. RISHABH reached out to Nutrition who will place rec in chart for CM to place order.     [place] order completed for pt's tube feeds at home. Spoke with Suzan with [employer] who will review order. She did confirm family has an infinity pump at home from previous order. As per Suzan that would not require a flush bag as family and manually flush pt.     Hilario sent script for [employer] Vest at home. Information provided to the CC resident to complete as well as information needed in chart. As per Hilario, pt's RT - Charley is going to reach out to see what vest they currently have at home. If they own the vest pt would not be eligible for a new one. If they are able to return the vest from where it was received they would be eligible for a new one.

## 2024-10-28 NOTE — UTILIZATION REVIEW
"Continued Stay Review    Date: 10/28/24                          Current Patient Class: Inpatient  Current Level of Care: ICU    HPI:39 y.o. male initially admitted on  10/5/24 inpatient     \"admitted to medical ICU due to respiratory failure, complex past medical history includes ventilator dependent respiratory failure due to an unknown neurologic condition, ventilator dependence, history of seminoma status post orchiectomy and chemotherapy that may have led to his neurologic condition, status post trach, PEG, PE on indefinite anticoagulation.   During his hospitalization he was admitted initially due to respiratory failure found to have left-sided consolidation along with hypernatremia.  Bronchoscopy with Pseudomonas, Proteus treated with cefepime and Zosyn.  He has had several bronchoscopies during his hospitalization, still continues to have worsening white out on imaging.[id number]: Culture/stain 2+ poly, 2+ gram-positive rods, 1+ gram-negative rods growing.   Completed 10-day course of antibiotics for Pseudomonas and Proteus mirabilis pneumonia    Neurology consulted for possible seizure-like activity but they feel this is not seizures. Seizure-like activity is likely stereotypical behaviors likely related to agitation; responding well to Ativan as needed  Cardiology consulted due to vagal arrhythmias/bradycardia, no further management.MRI on 10/16 with hypoxic brain injury findings .    Bronchoscopy this week showed significant mucous plugs.  BAL showing Pseudomonas, will not treat as ventilator acquired pneumonia as this is now colonization at this point.      Assessment/Plan:     10/28/2024 .  Patient presents with  being alert and calm.   Responds to commands and shakes head yes and no.  Continues to experience desaturations/bradycardic episodes likely vasovagal in nature   On exam  skin with rash.   Trache.   Generalized swelling.  Abdominal hernia.   Appears ill.  Lungs rhonchi.  Motor deficit. "   Abnormal labs or imaging:  wbc 7.74.  H&H 9.6/30.6.   na 133.  Phos 1.9.  Alt 88.  AST 45. LP completed f/u pending results; STAT Gram stain showing 1+ mononuclear cells, ME panel negative.  Transverse myelitis w/u ordered: Serum NMO IgG autoantibodies, copper level, Sjogren's antibodies, vitamin B12, folate, vitamin B1, RPR, Lyme, vitamin D 25; largely WNL exception Vit. D0 MS panel and MOG still pending.   Diagnosis/Plan      Toxic metabolic encephalopathy/chronic respiratory failure with hypoxia and hypercapnia/intermittent bradycardia/Sepsis econdary to multifocal pneumonia in the setting of Proteus and Pseudomonas/Seizure like activity/Goals of care counseling.  after MRI C-Spine: Transverse myelitis.  Monitor mental status.    Continue vent support. Ativan as needed and manual bagging/suction   Xopnex and hypertonic saline nebs 3 times daily.   Continue Solu-Medrol today is day 4 for 5 days.  Continue IgG today is day 4 .  Goals of care - family wants full treatment.       Medications:   Scheduled Medications:  acetylcysteine, 3 mL, Nebulization, Q6H  apixaban, 5 mg, Per PEG Tube, BID  chlorhexidine, 15 mL, Mouth/Throat, Q12H ISAEL  clobetasol, , Topical, BID  clonazePAM, 0.5 mg, Oral, HS  gabapentin, 100 mg, Per PEG Tube, Q8H   Followed by  [START ON 10/31/2024] gabapentin, 100 mg, Per PEG Tube, BID   Followed by  [START ON 11/3/2024] gabapentin, 100 mg, Per PEG Tube, HS  guaiFENesin, 400 mg, Per G Tube, Q6H  immune globulin, human, 400 mg/kg (Adjusted), Intravenous, Q24H  levalbuterol, 1.25 mg, Nebulization, Q6H  melatonin, 3 mg, Per PEG Tube, HS  methylPREDNISolone sodium succinate, 1,000 mg, Intravenous, Daily  sodium phosphate, 30 mmol, Intravenous, Once 10/28    gabapentin (NEURONTIN) oral solution 300 mg  Dose: 300 mg  Freq: Every 8 hours Route: PEG TUBE  Start: 10/22/24 1600 End: 10/28/24 1017    Continuous IV Infusions:     PRN Meds:  acetaminophen, 650 mg, Per PEG Tube, Q6H PRN  bisacodyl, 10 mg,  Rectal, Daily PRN  fentaNYL, 50 mcg, Intravenous, Q2H PRN  LORazepam, 2 mg, Intravenous, Q4H PRN x 1   oxyCODONE, 5 mg, Per PEG Tube, Q6H PRN      Discharge Plan:  to be determined.     Vital Signs (last 3 days)       Date/Time Temp Pulse Resp BP MAP (mmHg) SpO2 FiO2 (%) O2 Device Patient Position - Orthostatic VS Purvi Coma Scale Score Pain    10/28/24 0900 -- 71 -- 111/67 83 90 % -- -- -- -- --    10/28/24 0800 100 °F (37.8 °C) 78 14 123/75 94 90 % -- -- -- 11 No Pain    10/28/24 0700 100 °F (37.8 °C) 64 16 131/82 101 96 % -- Ventilator Lying -- --    10/28/24 0600 99.9 °F (37.7 °C) 67 -- 143/77 98 93 % -- -- -- -- --    10/28/24 0500 99.9 °F (37.7 °C) 67 -- 109/66 82 90 % -- -- -- -- --    10/28/24 0400 100 °F (37.8 °C) 78 -- 111/66 82 92 % -- -- -- 11 --    10/28/24 0355 -- -- -- -- -- -- 40 Ventilator -- -- --    10/28/24 0300 99.9 °F (37.7 °C) 73 -- 120/76 94 91 % -- -- -- -- --    10/28/24 0200 99.9 °F (37.7 °C) 62 -- 109/66 83 91 % -- -- -- -- --    10/28/24 0100 99.9 °F (37.7 °C) 72 -- 118/69 88 94 % -- -- -- -- --    10/28/24 0057 -- -- -- -- -- -- 40 Ventilator -- -- --    10/28/24 0000 99.7 °F (37.6 °C) 75 -- 150/73 99 93 % -- -- -- 11 --    10/27/24 2300 99.3 °F (37.4 °C) 64 -- 110/67 83 93 % -- -- -- -- --    10/27/24 2200 99.1 °F (37.3 °C) 73 -- 106/62 77 92 % -- -- -- -- --    10/27/24 2100 99.1 °F (37.3 °C) 81 -- 129/77 96 95 % -- -- -- -- --    10/27/24 2000 99 °F (37.2 °C) 74 -- 128/82 101 97 % -- -- -- 11 No Pain    10/27/24 1944 -- -- -- -- -- -- 40 Ventilator -- -- --    10/27/24 1900 98.6 °F (37 °C) 58 -- 117/74 91 95 % -- -- -- -- --    10/27/24 1700 98.4 °F (36.9 °C) 72 -- 144/86 110 96 % -- -- -- -- --    10/27/24 1600 98.2 °F (36.8 °C) 80 -- 118/75 92 93 % -- -- -- 11 No Pain    10/27/24 1500 98.6 °F (37 °C) 60 -- 114/67 85 93 % -- -- -- -- --    10/27/24 1400 98.4 °F (36.9 °C) 62 -- 136/83 104 93 % -- -- -- -- --    10/27/24 1300 98.4 °F (36.9 °C) 79 -- 147/92 115 94 % -- -- -- -- --     10/27/24 1226 -- -- -- -- -- 100 % -- -- -- -- --    10/27/24 1200 97.9 °F (36.6 °C) 69 14 123/77 95 91 % -- -- -- 11 No Pain    10/27/24 1145 97.7 °F (36.5 °C) 62 -- 108/65 81 91 % -- -- -- -- --    10/27/24 1130 97.9 °F (36.6 °C) 62 -- 109/65 82 92 % -- -- -- -- --    10/27/24 1115 98.1 °F (36.7 °C) 65 -- 106/65 80 93 % -- -- -- -- --    10/27/24 1100 98.2 °F (36.8 °C) 69 -- 103/64 78 94 % -- -- -- -- --    10/27/24 1045 98.4 °F (36.9 °C) 74 -- 121/72 92 95 % -- -- -- -- --    10/27/24 1030 98.4 °F (36.9 °C) 66 -- 143/88 110 100 % -- -- -- -- --    10/27/24 1029 -- -- -- -- -- -- -- -- -- -- Med Not Given for Pain - for MAR use only    10/27/24 1015 98.2 °F (36.8 °C) 61 -- 133/74 99 94 % -- -- -- -- --    10/27/24 1000 98.2 °F (36.8 °C) 67 -- 116/72 90 89 % -- -- -- -- --    10/27/24 0945 98.4 °F (36.9 °C) 65 -- 107/70 82 89 % -- -- -- -- --    10/27/24 0930 98.4 °F (36.9 °C) 67 -- 105/68 81 89 % -- -- -- -- --    10/27/24 0915 98.4 °F (36.9 °C) 68 -- 104/61 75 90 % -- -- -- -- --    10/27/24 0900 98.6 °F (37 °C) 73 -- 102/62 75 91 % -- -- -- -- --    10/27/24 0845 99 °F (37.2 °C) 77 -- 108/58 78 90 % -- -- -- -- --    10/27/24 0830 99.1 °F (37.3 °C) 80 -- 128/79 96 95 % -- -- -- -- --    10/27/24 0815 99.1 °F (37.3 °C) 77 -- 130/84 102 97 % -- -- -- -- --    10/27/24 0800 99.1 °F (37.3 °C) 61 14 137/91 109 98 % 40 Ventilator Lying 11 No Pain    10/27/24 0745 99.1 °F (37.3 °C) 54 -- 136/84 106 97 % -- -- -- -- --    10/27/24 0730 99.3 °F (37.4 °C) 54 -- 115/72 89 94 % -- -- -- -- --    10/27/24 0715 99.5 °F (37.5 °C) 55 -- 111/70 86 94 % -- -- -- -- --    10/27/24 0700 99.7 °F (37.6 °C) 56 -- 106/67 82 94 % -- -- -- -- --    10/27/24 0645 99.9 °F (37.7 °C) 58 -- 108/65 82 94 % -- -- -- -- --    10/27/24 0500 99.3 °F (37.4 °C) 62 -- 143/98 116 100 % -- -- -- -- --    10/27/24 0415 -- -- -- -- -- -- 40 Ventilator -- -- --    10/27/24 0400 98.2 °F (36.8 °C) 57 -- 127/79 98 95 % -- -- -- 11 --    10/27/24 0300 98.4 °F  (36.9 °C) 59 14 107/61 77 94 % -- -- -- -- --    10/27/24 0200 98.6 °F (37 °C) 79 -- 130/80 100 93 % -- -- -- -- --    10/27/24 0111 -- -- -- -- -- -- 40 Ventilator -- -- --    10/27/24 0100 98.1 °F (36.7 °C) 54 -- 114/71 88 94 % -- -- -- -- --    10/27/24 0000 97.9 °F (36.6 °C) 55 -- 109/68 84 94 % -- -- -- 11 --    10/26/24 2300 97.7 °F (36.5 °C) 55 14 110/68 85 94 % -- -- -- -- --    10/26/24 2200 97.7 °F (36.5 °C) 60 -- 100/64 77 91 % -- -- -- -- --    10/26/24 2100 97.9 °F (36.6 °C) 65 -- 104/64 77 90 % -- -- -- -- --    10/26/24 2016 -- -- -- -- -- -- 40 Ventilator -- -- --    10/26/24 2000 98.1 °F (36.7 °C) 56 -- 143/84 106 96 % -- -- -- 11 No Pain    10/26/24 1900 97.9 °F (36.6 °C) 54 14 130/83 102 95 % -- -- -- -- --    10/26/24 1830 97.7 °F (36.5 °C) 56 -- 129/82 100 95 % -- -- -- -- --    10/26/24 1815 97.7 °F (36.5 °C) 54 -- 137/87 107 97 % -- -- -- -- --    10/26/24 1800 97.7 °F (36.5 °C) 56 -- 129/80 98 95 % -- -- -- -- --    10/26/24 1745 97.7 °F (36.5 °C) 57 -- 125/80 96 94 % -- -- -- -- --    10/26/24 1730 97.7 °F (36.5 °C) 57 -- 131/82 101 96 % -- -- -- -- --    10/26/24 1715 97.5 °F (36.4 °C) 55 -- 140/88 109 95 % -- -- -- -- --    10/26/24 1700 97.5 °F (36.4 °C) 54 -- 137/88 108 95 % -- -- -- -- --    10/26/24 1645 97.5 °F (36.4 °C) 54 -- 131/82 102 94 % -- -- -- -- --    10/26/24 1630 97.3 °F (36.3 °C) 56 -- 130/82 100 94 % -- -- -- -- --    10/26/24 1615 97.3 °F (36.3 °C) 55 -- 126/81 99 94 % -- -- -- -- --    10/26/24 1600 97.3 °F (36.3 °C) 57 -- 119/75 92 93 % -- -- -- 11 No Pain    10/26/24 1546 -- -- -- -- -- 93 % -- -- -- -- --    10/26/24 1545 97.3 °F (36.3 °C) 57 -- 131/81 101 93 % -- -- -- -- --    10/26/24 1515 97.5 °F (36.4 °C) 60 -- 119/77 94 93 % -- -- -- -- --    10/26/24 1415 98.8 °F (37.1 °C) 78 -- 121/77 94 93 % -- -- -- -- --    10/26/24 1315 99.7 °F (37.6 °C) 88 -- 136/94 111 97 % -- -- -- -- --    10/26/24 1302 -- -- -- -- -- 99 % -- -- -- -- --    10/26/24 1247 -- -- -- --  -- 95 % -- -- -- -- --    10/26/24 1215 99.3 °F (37.4 °C) 59 -- 155/104 125 100 % -- -- -- -- --    10/26/24 1200 -- -- -- -- -- -- -- -- -- 11 No Pain    10/26/24 1115 98.8 °F (37.1 °C) 59 -- 151/97 120 100 % -- -- -- -- --    10/26/24 1015 99.1 °F (37.3 °C) 59 -- 135/84 104 96 % -- -- -- -- --    10/26/24 0915 99.7 °F (37.6 °C) 67 -- 152/98 119 89 % -- -- -- -- --    10/26/24 0815 -- 69 -- 138/90 109 95 % -- -- -- -- --    10/26/24 0800 -- -- -- -- -- -- -- -- -- 11 No Pain    10/26/24 0734 -- -- -- -- -- 96 % -- -- -- -- --    10/26/24 0728 -- -- -- -- -- 95 % -- -- -- -- --    10/26/24 0715 -- 60 -- 131/81 99 95 % -- -- -- -- --    10/26/24 0605 100 °F (37.8 °C) 68 -- 142/88 109 93 % -- -- -- -- --    10/26/24 0505 -- 61 -- 164/94 123 97 % -- -- -- -- --    10/26/24 0434 -- -- -- -- -- -- 40 Ventilator -- -- --    10/26/24 0405 -- 61 -- 130/79 98 96 % -- -- -- -- --    10/26/24 0400 -- -- -- -- -- -- -- -- -- 11 --    10/26/24 0305 -- 75 14 143/95 115 94 % -- -- -- -- --    10/26/24 0205 99.5 °F (37.5 °C) 59 -- 131/82 102 97 % -- -- -- -- --    10/26/24 0105 -- 55 -- 132/80 101 97 % -- -- -- -- --    10/26/24 0005 -- 60 -- 121/75 92 97 % -- -- -- -- --    10/26/24 0000 -- -- -- -- -- -- -- -- -- 11 --    10/25/24 2307 -- -- -- -- -- -- 40 Ventilator -- -- --    10/25/24 2305 -- 77 14 130/92 107 97 % -- -- -- -- --    10/25/24 2205 -- 58 -- 123/79 96 97 % -- -- -- -- --    10/25/24 2100 -- 57 -- 128/80 99 96 % -- -- -- -- --    10/25/24 2000 -- 61 -- 119/76 92 96 % -- -- -- 11 --    10/25/24 1941 -- -- -- -- -- -- 40 Ventilator -- -- --    10/25/24 1900 98.5 °F (36.9 °C) 60 14 120/77 94 96 % -- Ventilator Lying -- --    10/25/24 1830 98.7 °F (37.1 °C) 65 -- -- -- 94 % -- -- -- -- --    10/25/24 1800 -- 70 -- 117/73 89 94 % -- -- -- -- --    10/25/24 1745 -- 73 -- 126/79 96 96 % -- -- -- -- --    10/25/24 1730 -- 79 -- 151/95 119 100 % -- -- -- -- --    10/25/24 1715 -- 78 -- 139/96 113 95 % -- -- -- -- --     10/25/24 1700 -- 82 -- 137/92 110 97 % -- -- -- -- --    10/25/24 1645 -- 81 -- 124/80 97 96 % -- -- -- -- --    10/25/24 1630 -- 83 -- 136/88 107 97 % -- -- -- -- --    10/25/24 1615 -- 79 -- 130/87 104 95 % -- -- -- -- --    10/25/24 1600 -- 84 -- 129/83 101 97 % -- -- -- 11 --    10/25/24 1545 -- 89 -- 118/78 93 97 % -- -- -- -- --    10/25/24 1530 -- 86 -- 159/98 123 94 % -- -- -- -- --    10/25/24 1522 -- -- -- -- -- 100 % -- -- -- -- --    10/25/24 1515 -- 75 -- 159/98 123 96 % -- -- -- -- --    10/25/24 1500 99.1 °F (37.3 °C) 79 14 130/85 104 93 % -- Trach mask Lying -- --    10/25/24 1430 -- 92 -- 113/67 83 96 % -- -- -- -- --    10/25/24 1415 -- 91 -- 109/68 84 97 % -- -- -- -- --    10/25/24 1408 -- -- -- -- -- 97 % -- -- -- -- --    10/25/24 1400 -- 79 -- 115/75 89 98 % -- -- -- -- --    10/25/24 1345 -- 78 -- 120/77 94 97 % -- -- -- -- --    10/25/24 1330 -- 79 -- 141/93 111 98 % -- -- -- -- --    10/25/24 1315 -- 73 -- 146/95 114 97 % -- -- -- -- --    10/25/24 1300 -- 70 -- 148/95 115 97 % -- -- -- -- --    10/25/24 1245 -- 72 -- 118/86 98 97 % -- -- -- -- --    10/25/24 1230 -- 73 -- 124/90 102 96 % -- -- -- -- --    10/25/24 1215 -- 76 -- 127/91 103 97 % -- -- -- -- --    10/25/24 1200 -- 78 -- 133/84 101 96 % -- -- -- 11 --    10/25/24 1145 -- 84 -- 122/82 96 96 % -- -- -- -- --    10/25/24 1133 -- -- -- -- -- 100 % -- -- -- -- --    10/25/24 1130 -- 90 -- 122/85 99 100 % -- -- -- -- --    10/25/24 1100 -- 78 -- 125/82 98 94 % -- -- -- -- --    10/25/24 10:32:32 -- 70 18 124/83 -- 98 % -- -- -- -- --    10/25/24 10:27:55 -- 82 18 138/95 -- 98 % -- -- -- -- --    10/25/24 1000 -- 85 -- 132/86 104 94 % -- -- -- -- --    10/25/24 0900 -- 88 -- 137/87 107 100 % -- -- -- -- --    10/25/24 0800 -- -- -- -- -- -- -- -- -- 11 No Pain    10/25/24 0728 -- -- -- -- -- 95 % -- -- -- -- --    10/25/24 0700 99.2 °F (37.3 °C) 68 18 101/57 72 92 % -- Trach mask Lying -- --    10/25/24 0600 -- 77 -- 116/75 91 93 %  -- -- -- -- --    10/25/24 0500 -- 79 -- 112/71 85 94 % -- -- -- -- --    10/25/24 0400 -- 82 -- 126/78 97 94 % -- -- -- 11 --    10/25/24 0330 -- -- -- -- -- -- 40 Ventilator -- -- --    10/25/24 0300 -- 82 -- 101/57 74 93 % -- -- -- -- --    10/25/24 0200 -- 94 -- 109/64 80 90 % -- -- -- -- --    10/25/24 0100 -- 82 -- 137/84 105 94 % -- -- -- -- --    10/25/24 0017 -- -- -- -- -- -- 40 Ventilator -- -- --    10/25/24 0000 -- 85 -- 110/65 81 94 % -- -- -- 11 --            Pertinent Labs/Diagnostic Results:   Radiology:  XR chest portable   Final Interpretation by Rivrea Matt MD (10/27 1654)      Improved pulmonary edema with persistent pleural effusions and left basilar atelectasis.            Workstation performed: [id number]         IR lumbar puncture   Final Interpretation by Bhupinder Ohara MD (10/25 1512)   Fluoroscopic-guided diagnostic lumbar puncture.      Workstation performed: [id number]         MRI cervical spine w wo contrast   Final Interpretation by E. Alec Schoenberger, MD (10/23 1300)      Long segment of abnormal signal in the cervical and upper thoracic spine favored to represent nonspecific transverse myelitis.  No cord expansion, atrophy or abnormal enhancement.   Recommend clinical correlation and further evaluation with CSF. Recommend 3-month follow-up contrast-enhanced MRI.                  Workstation performed: [id number]         XR chest portable   Final Interpretation by Quentin Brownlee DO (10/21 1622)      Limited study.      Persistent left basilar opacity may represent atelectasis and or pneumonia.      Prominent pulmonary vasculature may be accentuated by low lung volumes versus mild volume overload.      Workstation performed: [id number]         XR chest portable ICU   Final Interpretation by Joan Mcintyre MD (10/17 2027)      Persistent extensive opacity in the left lower lobe with loss of the diaphragm which could be due to atelectasis and/or pneumonia in the  appropriate clinical setting.            Workstation performed: [id number]         XR chest portable   Final Interpretation by Tobias Leon MD (10/17 1328)      Study limited by low lung volumes. Persistent dense  left  lung base opacity which may represent a combination of effusion and parenchymal opacity.            Workstation performed: [id number]         MRI brain w wo contrast   Final Interpretation by Andi Mccloud MD (10/17 0112)      Mildly restricted diffusion in the splenium of the corpus callosum and more subtly in the anterior cingulate gyri. Possible sequela of hypoxic ischemic injury, inflammatory or infectious process.      The study was marked in EPIC for immediate notification..      Workstation performed: [id number]         XR chest portable ICU   Final Interpretation by Jewel Burton MD (10/15 0521)      Opacification at both lung bases is unchanged likely due to atelectasis. Infiltrates and pleural effusions not excluded.            Workstation performed: [id number]         CT chest abdomen pelvis w contrast   Final Interpretation by Philippe Coates MD (10/11 6084)         1. Moderate bibasilar pulmonary opacification consistent with atelectasis; superimposed pneumonia not entirely excludable. Trace left greater than right pleural effusions.   2. No acute abnormality identified in the abdomen or pelvis.   3. Additional findings as noted.               Workstation performed: [id number]         XR chest portable ICU   Final Interpretation by Brendon Snyder MD (10/11 5566)      Left basilar effusion and atelectasis redemonstrated. Milder right basilar effusion/subsegmental atelectasis, also similar.         Workstation performed: [id number]         CT head wo contrast   Final Interpretation by Andi Mccloud MD (10/09 2001)      No acute intracranial abnormality.                  Workstation performed: [id number]         XR chest portable ICU   Final  Interpretation by Andrea Bai MD (10/09 7435)      Left basilar effusion and atelectasis redemonstrated. Milder right basilar effusion/subsegmental atelectasis, also similar.            Workstation performed: [id number]         XR chest portable ICU   Final Interpretation by Carlos Atkinson MD (10/07 1303)      Near complete opacification of the left hemithorax with leftward mediastinal shift, mildly improved from 10/5/2024, suggesting baseline underlying atelectasis and pleural effusion.      Note, left lung pneumonia cannot be excluded.      No pneumothorax.            Resident: SONAM KNOX I, the attending radiologist, have reviewed the images and agree with the final report above.      Workstation performed: [id number]         MRI brain w wo contrast    (Results Pending)     Cardiology:  ECG 12 lead   Final Result by Roe Carrasco MD (10/21 1037)   Sinus bradycardia   Normal ECG   When compared with ECG of 09-OCT-2024 11:56,   Nonspecific ST abnormality is no longer Present   Confirmed by Roe Carrasco ([id number]) on 10/21/2024 10:37:42 AM      Echo follow up/limited w/ contrast if indicated   Final Result by Rik Maurer MD (10/15 4125)        Left Ventricle: Left ventricular cavity size is normal. Wall thickness    is normal. The left ventricular ejection fraction is 65%. Systolic    function is normal. Wall motion is normal. Diastolic function is normal.     Right Ventricle: Right ventricular cavity size is moderately dilated.     Tricuspid Valve: There is mild regurgitation.     Prior TTE study available for comparison. Prior study date: 6/11/2024.    Changes noted when compared to prior study. Changes include: Right    ventricle is dilated now..     This was a limited study.         ECG 12 lead   Final Result by Manuela Ayala MD (10/09 2608)   Sinus bradycardia   RSR' or QR pattern in V1 suggests right ventricular conduction delay   Nonspecific ST and T wave abnormality    Abnormal ECG   When compared with ECG of 05-OCT-2024 19:36,   Vent. rate has decreased BY  63 BPM   RSR' pattern in V1 is now Present   Confirmed by Manuela Ayala ([id number]) on 10/9/2024 6:48:48 PM        GI:  Bronchoscopy (Bedside)   Final Result by Yessy Barksdale MD (10/27 2033)      Bronchoscopy   Final Result by Yessy Barksdale MD (10/27 1337)   All observed locations appeared normal, including the upper trachea,    middle trachea, lower trachea, main prashant, left main stem, JOSÉ MANUEL, lingula,    LLL, right main stem, RUL, bronchus intermedius, RML and RLL.         RECOMMENDATION:   There is no recommended follow-up for this procedure.          I was present for the entire procedure   Diffuse airway secretions throughout all mainstem's, this was irrigated    until clear   No BAL was performed      Yessy Barksdale MD   Pulmonary and Critical Care Medicine             Bronchoscopy   Final Result by Yessy Barksdale MD (10/26 1105)   All observed locations appeared normal, including the upper trachea,    middle trachea, lower trachea, main prashant, left main stem, JOSÉ MANUEL, lingula,    LLL, right main stem, RUL, bronchus intermedius, RML and RLL.         RECOMMENDATION:   There is no recommended follow-up for this procedure.          I was present for the entire procedure      Yessy Barksdale MD   Pulmonary and Critical Care Medicine             Bronchoscopy   Final Result by Yessy Barksdale MD (10/26 1413)      Bronchoscopy   Final Result by Yessy Barksdale MD (10/25 1342)   All observed locations appeared normal, including the upper trachea,    middle trachea, lower trachea, main prashant, left main stem, JOSÉ MANUEL, lingula,    LLL, right main stem, RUL, bronchus intermedius, RML and RLL.         RECOMMENDATION:   There is no recommended follow-up for this procedure.          I was present for the entire procedure   Yessy Barksdale MD   Pulmonary and Critical Care Medicine          Bronchoscopy   Final Result by Yessy Barksdale MD (10/21 1301)   All observed  locations appeared normal, including the lower trachea, main    prashant, left main stem, JOSÉ MANUEL, lingula, LLL, right main stem, RUL, bronchus    intermedius, RML and RLL.   Bronchoalveolar lavage was performed x1 in the left main stem and the    fluid appeared cloudy and purulent         RECOMMENDATION:   There is no recommended follow-up for this procedure.          Attending attestation   Patient was preoxygenated to the ventilator and given sedation   Bronchoscopy initially performed with large bronchoscope disposable/orange    but there was difficulty getting through the tracheostomy tubing, the    inner cannula was caught and would not allow the bronchoscope to proceed.     This needed to be removed.  He did desaturate during this event as this    occluded his airway.  We changed this out with a new inner cannula along    with a medium size bronchoscope.   Air inspection showed diffuse accretions throughout the airway.   Left mainstem BAL      Yessy Barksdale MD   Pulmonary and Critical Care Medicine          Bronchoscopy   Final Result by El Sams MD (10/20 6488)   Moderate, thick and purulent secretions present in the left main stem,    left upper lobar bronchus, lingular lobar bronchus, left lower lobar    bronchus, right main stem, right upper lobar bronchus, bronchus    intermedius, right middle lobar bronchus and right lower lobar bronchus;    performed therapeutic aspiration   Erythematous, friable mucosa in the left main stem, left upper lobar    bronchus, lingular lobar bronchus, left lower lobar bronchus, right main    stem, right upper lobar bronchus, bronchus intermedius, right middle lobar    bronchus and right lower lobar bronchus         RECOMMENDATION:   There is no recommended follow-up for this procedure.       I was the supervising physician and present for the entire procedure on    10/20/2024      There was diffuse bilateral mucous plugging of both lungs that was    suctioned and cleared.   Bilateral airway survey to the subsegmental    bronchi without any evidence of endobronchial lesions, extrinsic    compression, or active bleeding.      El Sams MD      Bronchoscopy   Final Result by El Sams MD (10/15 9099)   Left lung mucus plugging- suctioned and cleared         RECOMMENDATION:   Send trap for gram stain and culture   Continue to suction as needed and airway clearance      El Sams MD      Bronchoscopy   Final Result by Silvio Alsa MD (10/06 6697)   All observed locations appeared normal, including the pharynx, larynx,    vocal cords, trachea, main prashant, left lung and right lung.   Excessive, thick and white secretions present in the lingula and LLL;    performed therapeutic aspiration         RECOMMENDATION:   Follow up cultures   Continue to monitor in ICU           Results from last 7 days   Lab Units 10/28/24  0452 10/27/24  0406 10/26/24  0425 10/25/24  0441 10/24/24  0439 10/23/24  0636 10/22/24  0528   WBC Thousand/uL 7.74 7.40 5.10 9.31 7.42   < > 9.81   HEMOGLOBIN g/dL 9.6* 9.7* 10.4* 9.8* 9.7*   < > 10.3*   HEMATOCRIT % 30.6* 31.3* 32.2* 31.8* 31.2*   < > 33.1*   PLATELETS Thousands/uL 189 185 195 209 195   < > 209   TOTAL NEUT ABS Thousands/µL 6.93  --   --   --   --   --  8.18*    < > = values in this interval not displayed.     Results from last 7 days   Lab Units 10/28/24  0452 10/27/24  0406 10/26/24  0425 10/25/24  0441 10/24/24  0439 10/23/24  0636 10/22/24  0528   SODIUM mmol/L 133* 132* 134* 134* 136 137 134*   POTASSIUM mmol/L 4.1 3.8 4.1 4.0 4.0 4.2 4.1   CHLORIDE mmol/L 96 96 96 94* 96 97 100   CO2 mmol/L 38* 32 33* 37* 37* 34* 34*   ANION GAP mmol/L -1* 4 5 3* 3* 6 0*   BUN mg/dL 20 19 14 11 11 12 10   CREATININE mg/dL 0.44* 0.44* 0.50* 0.47* 0.40* 0.45* 0.43*   EGFR ml/min/1.73sq m 145 145 138 142 151 144 147   CALCIUM mg/dL 8.4 8.6 8.7 8.7 8.6 8.9 8.5   CALCIUM, IONIZED mmol/L  --  1.10*  --  1.13  --  1.16 1.13   MAGNESIUM mg/dL 2.0 1.9 1.9 1.8*  --   2.0 1.9   PHOSPHORUS mg/dL 1.9* 1.9* 2.8 3.3  --  3.1 3.4     Results from last 7 days   Lab Units 10/28/24  0452 10/27/24  0406   AST U/L 45* 23   ALT U/L 88* 55*   ALK PHOS U/L 76 83   TOTAL PROTEIN g/dL 7.3 7.1   ALBUMIN g/dL 2.7* 2.9*   TOTAL BILIRUBIN mg/dL 0.56 0.53     Results from last 7 days   Lab Units 10/28/24  0452 10/27/24  0406 10/26/24  0425 10/25/24  0441 10/24/24  0439 10/23/24  0636 10/22/24  0528   GLUCOSE RANDOM mg/dL 132 160* 165* 83 108 101 113     Results from last 7 days   Lab Units 10/25/24  1142 10/21/24  1254   GRAM STAIN RESULT  No No organisms seen  1+ Mononuclear Cells  No Polys 2+ Polys*  2+ Gram positive rods*  1+ Gram negative rods*     Results from last 7 days   Lab Units 10/25/24  1041   TOTAL COUNTED  5     Results from last 7 days   Lab Units 10/25/24  1125 10/25/24  1041   APPEARANCE CSF   --  Clear, Colorless   TUBE NUM CSF   --  4   WBC CSF /uL  --  1   XANTHOCHROMIA   --  No   LYMPHS % (CSF) %  --  100   GLUCOSE CSF mg/dL  --  54   PROTEIN CSF mg/dL  --  32   RBC CSF uL  --  0   CSF CULTURE  No growth  --        Network Utilization Review Department  ATTENTION: Please call with any questions or concerns to [phone] and carefully listen to the prompts so that you are directed to the right person. All voicemails are confidential.   For Discharge needs, contact Care Management DC Support Team at [phone] opt. 2  Send all requests for admission clinical reviews, approved or denied determinations and any other requests to dedicated fax number below belonging to the campus where the patient is receiving treatment. List of dedicated fax numbers for the Facilities:  FACILITY NAME UR FAX NUMBER   ADMISSION DENIALS (Administrative/Medical Necessity) [phone]   DISCHARGE SUPPORT TEAM (NETWORK) [phone]   PARENT CHILD HEALTH (Maternity/NICU/Pediatrics) [phone]   [facility] [phone]   [facility]  [phone]   [facility] [phone]   [facility] [phone]   [facility] [phone]   [facility] [phone]   [facility] [phone]   [facility] [phone]   [facility] [phone]   [facility] [phone]   [facility] [phone]   [facility] [phone]      Yes

## 2024-10-28 NOTE — CASE MANAGEMENT
Case Management Discharge Planning Note    Patient name Hemanth Swanson  Location ICU 05/ICU 05 MRN [id number]  : 1985 Date 10/28/2024       Current Admission Date: 10/5/2024  Current Admission Diagnosis:Toxic metabolic encephalopathy   Patient Active Problem List    Diagnosis Date Noted Date Diagnosed    Rash of back 10/25/2024     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HCC) 10/17/2024     Palliative care by specialist 10/11/2024     Goals of care, counseling/discussion 10/11/2024     Toxic metabolic encephalopathy 10/07/2024     Cardiac arrest due to other underlying condition (HCC) 2024     Seizure-like activity (HCC) 2024     Bradycardia 2024     Ventilator dependent (HCC) 2024     Obesity hypoventilation syndrome (HCC) 10/24/2023     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     Psychophysiological insomnia 10/18/2023     Impaired mobility 2023     Status post tracheostomy ([facility]) 2023     S/P percutaneous endoscopic gastrostomy (PEG) tube placement ([facility]) 2023     Anxiety 2023     Chronic respiratory failure with hypoxia and hypercapnia ([facility]) 2023     Sepsis (HCC) 2023     Acute pulmonary embolism without acute cor pulmonale (HCC) 2023     Depression 2023     History of LVH (left ventricular hypertrophy) 2023     Pure hypertriglyceridemia 2023     Unilateral vestibular schwannoma (HCC) 2023     Port-A-Cath in place 2023     Diplopia 2023     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HCC) 2023     Umbilical hernia without obstruction and without gangrene 12/10/2022     Tobacco use 12/10/2022     Retroperitoneal lymphadenopathy 12/10/2022       LOS (days): 23  Geometric Mean LOS (GMLOS) (days):   Days to GMLOS:     OBJECTIVE:  Risk of Unplanned Readmission Score: 38.56         Current admission status: Inpatient   Preferred Pharmacy:   [facility]/pharmacy #[id number] - [place] [address]  [facility] [address]  Phone: [phone] Fax: [phone]    Primary Care Provider: Ela Douglas MD    Primary Insurance: [facility]  Secondary Insurance:     DISCHARGE DETAILS:    Discharge planning discussed with:: Pt's sisterDmitry  [place] of Choice: Yes  Comments - Freedom of Choice: Return home with family support and [facility]    Contacts  Patient Contacts: Dmitry Swanson (sister)  Relationship to Patient:: Family  Contact Method: Phone  Phone Number: [phone]  Reason/Outcome: Discharge Planning, Continuity of Care, Emergency Contact, Referral    Requested Home Health Care         Is the patient interested in HHC at discharge?: Yes  Home Health Discipline requested:: Occupational Therapy, Physical Therapy  Home Health Agency Name:: [facility]  HHA External Referral Reason (only applicable if external HHA name selected): Scheduling access issues  Home Health Follow-Up Provider:: PCP  Home Health Services Needed:: Evaluate Functional Status and Safety, Gait/ADL Training, Strengthening/Theraputic Exercises to Improve Function  Homebound Criteria Met:: Requires Medical Transportation, Requires the Assistance of Another Person for Safe Ambulation or to Leave the Home, Uses an Assist Device (i.e. cane, walker, etc)  Supporting Clincal Findings:: Bed Bound or Wheelchair Bound    Other Referral/Resources/Interventions Provided:  Interventions: HHC  Referral Comments: CM spoke with CC team. New VENT script needed for any changes in settings - script provided to team to completed. Pt will be recommended to have tube feeds daily. CM left  for Suzan with [facility] to confirm new order needed. Dmitry does have pump at home and some tube feeds. [facility] is able to accept for PT/OT - CM spoke with Dmitry. She is aware CM awaiting final script for VENT at home, call back regarding new tube feed order, and CM also reviewed [facility] able to accept to evaluate pt at  home for PT/OT. Dmitry would like to accept services with [facility]. Aware CM will keep her updated regarding dcp.

## 2024-10-29 LAB
AMPAR1 IGG CSF QL IF: NEGATIVE
AMPAR2 IGG CSF QL IF: NEGATIVE
AMPHIPHYSIN AB CSF QL IF: NEGATIVE
ANION GAP SERPL CALCULATED.3IONS-SCNC: 0 MMOL/L (ref 4–13)
AQP4 H2O CHANNEL AB CSF QL IF: NEGATIVE
BACTERIA CSF CULT: ABNORMAL
BASOPHILS # BLD AUTO: 0.01 THOUSANDS/ΜL (ref 0–0.1)
BASOPHILS NFR BLD AUTO: 0 % (ref 0–1)
BUN SERPL-MCNC: 25 MG/DL (ref 5–25)
CA-I BLD-SCNC: 1.15 MMOL/L (ref 1.12–1.32)
CALCIUM SERPL-MCNC: 8.2 MG/DL (ref 8.4–10.2)
CASPR2 AB CSF QL CBA IFA: NEGATIVE
CHLORIDE SERPL-SCNC: 96 MMOL/L (ref 96–108)
CO2 SERPL-SCNC: 38 MMOL/L (ref 21–32)
CREAT SERPL-MCNC: 0.39 MG/DL (ref 0.6–1.3)
CRYOGLOB SER QL 1D COLD INC: NORMAL
CV2 AB CSF QL IF: NEGATIVE
DPPX IGG CSF QL IF: NEGATIVE
EOSINOPHIL # BLD AUTO: 0 THOUSAND/ΜL (ref 0–0.61)
EOSINOPHIL NFR BLD AUTO: 0 % (ref 0–6)
ERYTHROCYTE [DISTWIDTH] IN BLOOD BY AUTOMATED COUNT: 17.2 % (ref 11.6–15.1)
GABABR AB CSF QL CBA IFA: NEGATIVE
GAD65 AB CSF IA-SCNC: NEGATIVE NMOL/L
GFR SERPL CREATININE-BSD FRML MDRD: 153 ML/MIN/1.73SQ M
GLIAL NUC TYPE 1 AB CSF QL IF: NEGATIVE
GLUCOSE SERPL-MCNC: 137 MG/DL (ref 65–140)
HCT VFR BLD AUTO: 33.6 % (ref 36.5–49.3)
HGB BLD-MCNC: 10.3 G/DL (ref 12–17)
HU1 AB CSF QL IF: NEGATIVE
HU2 AB CSF QL IF: NEGATIVE
HU3 AB CSF QL IF: NEGATIVE
IGLON5 IGG CSF QL CBA IFA: NEGATIVE
IMM GRANULOCYTES # BLD AUTO: 0.07 THOUSAND/UL (ref 0–0.2)
IMM GRANULOCYTES NFR BLD AUTO: 1 % (ref 0–2)
IMP & REVIEW OF LAB RESULTS: NEGATIVE
IP3R 1 IGG CSF QL IF: NEGATIVE
LGI1 AB CSF QL CBA IFA: NEGATIVE
LYMPHOCYTES # BLD AUTO: 0.27 THOUSANDS/ΜL (ref 0.6–4.47)
LYMPHOCYTES NFR BLD AUTO: 3 % (ref 14–44)
MA+TA AB CSF QL IF: NEGATIVE
MAGNESIUM SERPL-MCNC: 1.9 MG/DL (ref 1.9–2.7)
MCH RBC QN AUTO: 30.1 PG (ref 26.8–34.3)
MCHC RBC AUTO-ENTMCNC: 30.7 G/DL (ref 31.4–37.4)
MCV RBC AUTO: 98 FL (ref 82–98)
MGLUR1 IGG CSF QL CBA IFA: NEGATIVE
MISCELLANEOUS LAB TEST RESULT: NORMAL
MONOCYTES # BLD AUTO: 0.7 THOUSAND/ΜL (ref 0.17–1.22)
MONOCYTES NFR BLD AUTO: 8 % (ref 4–12)
NEUTROPHILS # BLD AUTO: 7.66 THOUSANDS/ΜL (ref 1.85–7.62)
NEUTS SEG NFR BLD AUTO: 88 % (ref 43–75)
NMDAR IGG CSF QL IF: NEGATIVE
NRBC BLD AUTO-RTO: 0 /100 WBCS
PCA-2 AB CSF QL IF: NEGATIVE
PCA-TR AB CSF QL CBA IFA: NEGATIVE
PCA-TR AB CSF QL IF: NEGATIVE
PHOSPHATE SERPL-MCNC: 2.2 MG/DL (ref 2.7–4.5)
PLATELET # BLD AUTO: 165 THOUSANDS/UL (ref 149–390)
PMV BLD AUTO: 10.6 FL (ref 8.9–12.7)
POTASSIUM SERPL-SCNC: 4 MMOL/L (ref 3.5–5.3)
PURKINJE CELLS AB CSF QL IF: NEGATIVE
RBC # BLD AUTO: 3.42 MILLION/UL (ref 3.88–5.62)
SODIUM SERPL-SCNC: 134 MMOL/L (ref 135–147)
WBC # BLD AUTO: 8.71 THOUSAND/UL (ref 4.31–10.16)
ZIC4 ANTIBODY, CSF: NEGATIVE

## 2024-10-29 PROCEDURE — 88312 SPECIAL STAINS GROUP 1: CPT | Performed by: STUDENT IN AN ORGANIZED HEALTH CARE EDUCATION/TRAINING PROGRAM

## 2024-10-29 PROCEDURE — 88342 IMHCHEM/IMCYTCHM 1ST ANTB: CPT | Performed by: STUDENT IN AN ORGANIZED HEALTH CARE EDUCATION/TRAINING PROGRAM

## 2024-10-29 PROCEDURE — 88341 IMHCHEM/IMCYTCHM EA ADD ANTB: CPT | Performed by: STUDENT IN AN ORGANIZED HEALTH CARE EDUCATION/TRAINING PROGRAM

## 2024-10-29 PROCEDURE — 88313 SPECIAL STAINS GROUP 2: CPT | Performed by: STUDENT IN AN ORGANIZED HEALTH CARE EDUCATION/TRAINING PROGRAM

## 2024-10-29 PROCEDURE — 94640 AIRWAY INHALATION TREATMENT: CPT

## 2024-10-29 PROCEDURE — NC001 PR NO CHARGE: Performed by: INTERNAL MEDICINE

## 2024-10-29 PROCEDURE — 88350 IMFLUOR EA ADDL 1ANTB STN PX: CPT | Performed by: STUDENT IN AN ORGANIZED HEALTH CARE EDUCATION/TRAINING PROGRAM

## 2024-10-29 PROCEDURE — 84100 ASSAY OF PHOSPHORUS: CPT

## 2024-10-29 PROCEDURE — 85025 COMPLETE CBC W/AUTO DIFF WBC: CPT

## 2024-10-29 PROCEDURE — 94668 MNPJ CHEST WALL SBSQ: CPT

## 2024-10-29 PROCEDURE — 82330 ASSAY OF CALCIUM: CPT

## 2024-10-29 PROCEDURE — 83735 ASSAY OF MAGNESIUM: CPT

## 2024-10-29 PROCEDURE — 94669 MECHANICAL CHEST WALL OSCILL: CPT

## 2024-10-29 PROCEDURE — 94664 DEMO&/EVAL PT USE INHALER: CPT

## 2024-10-29 PROCEDURE — 88305 TISSUE EXAM BY PATHOLOGIST: CPT | Performed by: STUDENT IN AN ORGANIZED HEALTH CARE EDUCATION/TRAINING PROGRAM

## 2024-10-29 PROCEDURE — 94760 N-INVAS EAR/PLS OXIMETRY 1: CPT

## 2024-10-29 PROCEDURE — 94003 VENT MGMT INPAT SUBQ DAY: CPT

## 2024-10-29 PROCEDURE — 94150 VITAL CAPACITY TEST: CPT

## 2024-10-29 PROCEDURE — 88346 IMFLUOR 1ST 1ANTB STAIN PX: CPT | Performed by: STUDENT IN AN ORGANIZED HEALTH CARE EDUCATION/TRAINING PROGRAM

## 2024-10-29 PROCEDURE — 80048 BASIC METABOLIC PNL TOTAL CA: CPT

## 2024-10-29 PROCEDURE — 99232 SBSQ HOSP IP/OBS MODERATE 35: CPT | Performed by: INTERNAL MEDICINE

## 2024-10-29 RX ORDER — MICONAZOLE NITRATE 20 MG/G
CREAM TOPICAL 2 TIMES DAILY
Status: DISCONTINUED | OUTPATIENT
Start: 2024-10-29 | End: 2024-10-31 | Stop reason: HOSPADM

## 2024-10-29 RX ORDER — FUROSEMIDE 10 MG/ML
40 SOLUTION ORAL DAILY
Status: DISCONTINUED | OUTPATIENT
Start: 2024-10-29 | End: 2024-10-31 | Stop reason: HOSPADM

## 2024-10-29 RX ORDER — AMOXICILLIN 250 MG
1 CAPSULE ORAL
Status: DISCONTINUED | OUTPATIENT
Start: 2024-10-29 | End: 2024-10-29

## 2024-10-29 RX ORDER — FLUCONAZOLE 40 MG/ML
200 POWDER, FOR SUSPENSION ORAL WEEKLY
Status: DISCONTINUED | OUTPATIENT
Start: 2024-10-29 | End: 2024-10-31 | Stop reason: HOSPADM

## 2024-10-29 RX ORDER — CLONAZEPAM 0.5 MG/1
0.5 TABLET ORAL
Status: DISCONTINUED | OUTPATIENT
Start: 2024-10-29 | End: 2024-10-31 | Stop reason: HOSPADM

## 2024-10-29 RX ORDER — MAGNESIUM SULFATE HEPTAHYDRATE 40 MG/ML
2 INJECTION, SOLUTION INTRAVENOUS ONCE
Status: COMPLETED | OUTPATIENT
Start: 2024-10-29 | End: 2024-10-29

## 2024-10-29 RX ADMIN — Medication 37.5 G: at 16:21

## 2024-10-29 RX ADMIN — CLONAZEPAM 0.5 MG: 0.5 TABLET ORAL at 22:54

## 2024-10-29 RX ADMIN — ACETYLCYSTEINE 600 MG: 200 SOLUTION ORAL; RESPIRATORY (INHALATION) at 01:30

## 2024-10-29 RX ADMIN — GABAPENTIN 200 MG: 250 SOLUTION ORAL at 08:23

## 2024-10-29 RX ADMIN — FUROSEMIDE 40 MG: 10 SOLUTION ORAL at 10:45

## 2024-10-29 RX ADMIN — ACETAMINOPHEN 650 MG: 650 SUSPENSION ORAL at 22:54

## 2024-10-29 RX ADMIN — ACETYLCYSTEINE 600 MG: 200 SOLUTION ORAL; RESPIRATORY (INHALATION) at 13:33

## 2024-10-29 RX ADMIN — SODIUM PHOSPHATE, MONOBASIC, MONOHYDRATE AND SODIUM PHOSPHATE, DIBASIC, ANHYDROUS 21 MMOL: 276; 142 INJECTION, SOLUTION INTRAVENOUS at 09:27

## 2024-10-29 RX ADMIN — MICONAZOLE NITRATE: 20 CREAM TOPICAL at 17:56

## 2024-10-29 RX ADMIN — GUAIFENESIN 400 MG: 200 SOLUTION ORAL at 04:44

## 2024-10-29 RX ADMIN — LEVALBUTEROL HYDROCHLORIDE 1.25 MG: 1.25 SOLUTION RESPIRATORY (INHALATION) at 13:33

## 2024-10-29 RX ADMIN — SODIUM CHLORIDE 1000 MG: 0.9 INJECTION, SOLUTION INTRAVENOUS at 08:18

## 2024-10-29 RX ADMIN — LEVALBUTEROL HYDROCHLORIDE 1.25 MG: 1.25 SOLUTION RESPIRATORY (INHALATION) at 01:30

## 2024-10-29 RX ADMIN — ACETYLCYSTEINE 600 MG: 200 SOLUTION ORAL; RESPIRATORY (INHALATION) at 07:25

## 2024-10-29 RX ADMIN — APIXABAN 5 MG: 5 TABLET, FILM COATED ORAL at 08:23

## 2024-10-29 RX ADMIN — CHLORHEXIDINE GLUCONATE 15 ML: 1.2 RINSE ORAL at 22:54

## 2024-10-29 RX ADMIN — LEVALBUTEROL HYDROCHLORIDE 1.25 MG: 1.25 SOLUTION RESPIRATORY (INHALATION) at 19:23

## 2024-10-29 RX ADMIN — GABAPENTIN 200 MG: 250 SOLUTION ORAL at 22:54

## 2024-10-29 RX ADMIN — MAGNESIUM SULFATE HEPTAHYDRATE 2 G: 40 INJECTION, SOLUTION INTRAVENOUS at 06:32

## 2024-10-29 RX ADMIN — CHLORHEXIDINE GLUCONATE 15 ML: 1.2 RINSE ORAL at 08:23

## 2024-10-29 RX ADMIN — LEVALBUTEROL HYDROCHLORIDE 1.25 MG: 1.25 SOLUTION RESPIRATORY (INHALATION) at 07:25

## 2024-10-29 RX ADMIN — CLOBETASOL PROPIONATE: 0.5 CREAM TOPICAL at 08:23

## 2024-10-29 RX ADMIN — APIXABAN 5 MG: 5 TABLET, FILM COATED ORAL at 17:56

## 2024-10-29 RX ADMIN — ACETYLCYSTEINE 600 MG: 200 SOLUTION ORAL; RESPIRATORY (INHALATION) at 19:23

## 2024-10-29 RX ADMIN — FLUCONAZOLE 200 MG: 40 POWDER, FOR SUSPENSION ORAL at 16:40

## 2024-10-29 RX ADMIN — GABAPENTIN 200 MG: 250 SOLUTION ORAL at 16:40

## 2024-10-29 RX ADMIN — ACETAMINOPHEN 650 MG: 650 SUSPENSION ORAL at 11:46

## 2024-10-29 RX ADMIN — Medication 3 MG: at 22:54

## 2024-10-29 NOTE — ASSESSMENT & PLAN NOTE
Neuromuscular disease post chemotherapyS/P tracheostomy in 9/2023.  He continues to fail SBT likely in the setting of diaphragmatic weakness in the setting of progressive demyelinating/ neuromuscular disease  Goals of care discussions continues disease driven  X-ray showed worsening opacities 10/21  Multiple bronchoscopies resulted thick mucus secretions  Sputum/bronc culture showing growth of Pseudomonas (s/p 10 days treatment for Pseudomonas/Proteus)  1026 on BiPAP satting 100% PEEP of 8  Patient is drowsy  Patient tried and failed to produce sputum on his own hypertonic saline, chest PT and suctioning.  Family/caregivers were unable to perform this at home due to physical limitations.  Patient's cognitive level is also contributing to the ability to mobilize secretions, increasing the risk for further infections and hospitalization.    Plan:  Continue vent support  Will monitor lethargy while on BiPaP   Xopnex and hypertonic saline nebs 3 times daily  Re-started chest therapy (previously halted due to concern they were precipitating vasovagal episodes)  Consider bronchoscopy for symptomatic relief as needed

## 2024-10-29 NOTE — ASSESSMENT & PLAN NOTE
Intermittent periods of agitation, restless however alert oriented x 4 without loss of consciousness over the past few days  MRI on 10/16 with hypoxic brain injury findings  MRI C-Spine on 10/23: Findings favor nonspecific transverse myelitis; no sign of acuity due to lack of cord expansion. Unable to rule out ramifications from prior ischemia.  Goals of care discussions result: continue with disease treatment palliative care has been follow now signed off as goals of care are clear.  Neurology following recommendations to repeat paraneoplastic workup and an MRI in about a month  [id number]/21: Culture/stain 2+ poly, 2+ gram-positive rods, 1+ gram-negative rods growing.    Copper 96 ug/dL                                   CSF Gram stain +1 CSF fluid to is clear glucose of 54, protein 32  CSF culture: No growth  Plan  Continue monitor mental status  Per family patient is at baseline events has been persistent over the past year  Regards to restless agitation bearing-down has responded to gabapentin now at 300 TID and Ativan as needed; episodes have reduced intensity   Continue monitoring metabolic derangement  Completed 10-day course of antibiotics for Pseudomonas and Proteus mirabilis pneumonia  Monitor off of antibiotics likely colonization   Transverse myelitis w/u ordered: Serum NMO IgG autoantibodies, copper level, Sjogren's antibodies, vitamin B12, folate, vitamin B1, RPR, Lyme, vitamin D 25; largely WNL exception Vit. D0 MS panel and MOG still pending.   LP completed f/u pending results; STAT Gram stain showing 1+ mononuclear cells, ME panel negative

## 2024-10-29 NOTE — PLAN OF CARE
Problem: Prexisting or High Potential for Compromised Skin Integrity  Goal: Skin integrity is maintained or improved  Description: INTERVENTIONS:  - Identify patients at risk for skin breakdown  - Assess and monitor skin integrity  - Assess and monitor nutrition and hydration status  - Monitor labs   - Assess for incontinence   - Turn and reposition patient  - Assist with mobility/ambulation  - Relieve pressure over bony prominences  - Avoid friction and shearing  - Provide appropriate hygiene as needed including keeping skin clean and dry  - Evaluate need for skin moisturizer/barrier cream  - Collaborate with interdisciplinary team   - Patient/family teaching  - Consider wound care consult   Outcome: Progressing     Problem: PAIN - ADULT  Goal: Verbalizes/displays adequate comfort level or baseline comfort level  Description: Interventions:  - Encourage patient to monitor pain and request assistance  - Assess pain using appropriate pain scale  - Administer analgesics based on type and severity of pain and evaluate response  - Implement non-pharmacological measures as appropriate and evaluate response  - Consider cultural and social influences on pain and pain management  - Notify physician/advanced practitioner if interventions unsuccessful or patient reports new pain  Outcome: Progressing     Problem: INFECTION - ADULT  Goal: Absence or prevention of progression during hospitalization  Description: INTERVENTIONS:  - Assess and monitor for signs and symptoms of infection  - Monitor lab/diagnostic results  - Monitor all insertion sites, i.e. indwelling lines, tubes, and drains  - Monitor endotracheal if appropriate and nasal secretions for changes in amount and color  - [place] appropriate cooling/warming therapies per order  - Administer medications as ordered  - Instruct and encourage patient and family to use good hand hygiene technique  - Identify and instruct in appropriate isolation precautions for  identified infection/condition  Outcome: Progressing  Goal: Absence of fever/infection during neutropenic period  Description: INTERVENTIONS:  - Monitor WBC    Outcome: Progressing     Problem: SAFETY ADULT  Goal: Patient will remain free of falls  Description: INTERVENTIONS:  - Educate patient/family on patient safety including physical limitations  - Instruct patient to call for assistance with activity   - Consult OT/PT to assist with strengthening/mobility   - Keep Call bell within reach  - Keep bed low and locked with side rails adjusted as appropriate  - Keep care items and personal belongings within reach  - Initiate and maintain comfort rounds  - Make Fall Risk Sign visible to staff  - Offer Toileting every  Hours, in advance of need  - Initiate/Maintain alarm  - Obtain necessary fall risk management equipment:   - Apply yellow socks and bracelet for high fall risk patients  - Consider moving patient to room near nurses station  Outcome: Progressing  Goal: Maintain or return to baseline ADL function  Description: INTERVENTIONS:  -  Assess patient's ability to carry out ADLs; assess patient's baseline for ADL function and identify physical deficits which impact ability to perform ADLs (bathing, care of mouth/teeth, toileting, grooming, dressing, etc.)  - Assess/evaluate cause of self-care deficits   - Assess range of motion  - Assess patient's mobility; develop plan if impaired  - Assess patient's need for assistive devices and provide as appropriate  - Encourage maximum independence but intervene and supervise when necessary  - Involve family in performance of ADLs  - Assess for home care needs following discharge   - Consider OT consult to assist with ADL evaluation and planning for discharge  - Provide patient education as appropriate  Outcome: Progressing  Goal: Maintains/Returns to pre admission functional level  Description: INTERVENTIONS:  - Perform AM-PAC 6 Click Basic Mobility/ Daily Activity  assessment daily.  - Set and communicate daily mobility goal to care team and patient/family/caregiver.   - Collaborate with rehabilitation services on mobility goals if consulted  - Perform Range of Motion  times a day.  - Reposition patient every  hours.  - Dangle patient  times a day  - Stand patient  times a day  - Ambulate patient  times a day  - Out of bed to chair  times a day   - Out of bed for meals  times a day  - Out of bed for toileting  - Record patient progress and toleration of activity level   Outcome: Progressing

## 2024-10-29 NOTE — RESPIRATORY THERAPY NOTE
RT Ventilator Management Note  Hemanth Swanson 39 y.o. male MRN: [id number]  Unit/Bed#: ICU 05 Encounter: [id number]       10/29/24 0731   Respiratory Assessment   Assessment Type During-treatment   General Appearance Sleeping   Respiratory Pattern Assisted   Chest Assessment Chest expansion symmetrical   Bilateral Breath Sounds Coarse   Suction Trach   O2 Device vent   Vent Information   Vent    Vent type     Vent Mode AC/VC   $ Pulse Oximetry Spot Check Charge Completed   SpO2 96 %   AC/VC Settings   Resp Rate (BPM) 14 BPM   Vt (mL) 500 mL   FIO2 (%) 40 %   PEEP (cmH2O) 8 cmH2O   Flow Pattern (LPM) 45 L/min   Trigger Sensitivity Flow (lpm) 3 %   Humidification Heater   Heater Temperature (Set) 98.6 °F (37 °C)   AC/VC Actuals   Resp Rate (BPM) 17 BPM   VT (mL) 531   MV 9   MAP (cmH2O) 15 cmH2O   Peak Pressure (cmH2O) 28 cmH2O   I/E Ratio (Obs) 1:1.8   Heater Temperature (Obs) 98.6 °F (37 °C)   AC/VC Alarms   High Peak Pressure (cmH2O) 45   High Resp Rate (BPM) 40 BPM   High MV (L/min) 16 L/min   Low MV (L/min) 3 L/min   Vt High (mL) 900 mL   Vt Low (mL) 250 mL   AC/VC Apnea Settings   Resp Rate (BPM) 14 BPM   VT (mL) 500 mL   FIO2 (%) 100 %   Apnea Time (s) 20 S   Apnea Flow (L/min) 60 L/min   Maintenance   Alarm (pink) cable attached Yes   Resuscitation bag with peep valve at bedside Yes   Water bag changed No   Circuit changed No   Daily Screen   Patient safety screen outcome: Failed   Not Ready for Weaning due to: Underline problem not resolved   Surgical Airway Distal;Cuffed;Other (Comment)   Placement Date/Time: 06/01/24 1155   Tube Size: (c) 8  Placed By: Physician  Placed by (Name): Dr. Ronquillo  Type: Tracheostomy  Style: (c) Distal;Cuffed;Other (Comment)   Status Cuff Inflated;Secured   Site Assessment Clean;Dry   Site Care Cleansed;Dried;Dressing applied   Inner Cannula Care Changed/new   Ties Assessment Clean;Dry   Surgical Airway Cuff Pressure (color) Green   Equipment at bedside  BVM;Wall Suction setup;Additional complete same size trach tube;Obturator;Additional complete one size smaller trach tube;Sterile saline;Additional inner cannula       Daily Screen         10/28/2024  0729 10/29/2024  0731          Patient safety screen outcome:: Failed Failed      Not Ready for Weaning due to:: -- Underline problem not resolved                Physical Exam:   Assessment Type: During-treatment  General Appearance: Sleeping  Respiratory Pattern: Assisted  Chest Assessment: Chest expansion symmetrical  Bilateral Breath Sounds: Coarse  Suction: Trach  O2 Device: vent      Resp Comments: pt currently in IR getting a procedure, vent on sb in ICU

## 2024-10-29 NOTE — ASSESSMENT & PLAN NOTE
Noted with abnormal MRI concerning for hypoxic injury  Neurology on board recommendations to repeat paraneoplastic workup repeat MRI in about a month  MRI-C Spine: Transverse myelitis not acute no cord expansion versus sequelae from old ischemia.    Plan  Continue Solu-Medrol today is day 5 for 5 days   Continue IgG today is day 5

## 2024-10-29 NOTE — UTILIZATION REVIEW
Continued Stay Review    Date: 10/29/24                          Current Patient Class: Inpatient  Current Level of Care: ICU    HPI:39 y.o. male initially admitted on 10/5/24 Toxic metabolic encephalopathy     Assessment/Plan:  Patient was alert this morning calm and cooperative with all commands. He seemed to have more strength and was able to squeeze provider's fingers. Patient continued to have vasovagal events. Continue to monitor mental status, monitor electrolytes and replete prn, fu on pending labs and LP results. Continue vent support, neb txs, chest therapy. IV Solu-medrol today then dc. Continue IgG, day 5 today. Monitor UOP and trial IVF as needed. Goals of care discussions continue, family wanting to care for pt at home, refusing rehab facility. Continue ICU level of care.     Medications:   Scheduled Medications:  acetylcysteine, 3 mL, Nebulization, Q6H  apixaban, 5 mg, Per PEG Tube, BID  chlorhexidine, 15 mL, Mouth/Throat, Q12H ISAEL  clobetasol, , Topical, BID  clonazePAM, 0.5 mg, Oral, HS  furosemide, 40 mg, Per PEG Tube, Daily  gabapentin, 200 mg, Per PEG Tube, Q8H   Followed by  [START ON 10/30/2024] gabapentin, 100 mg, Per PEG Tube, Q8H   Followed by  [START ON 11/1/2024] gabapentin, 100 mg, Per PEG Tube, Q12H   Followed by  [START ON 11/3/2024] gabapentin, 100 mg, Per PEG Tube, HS  guaiFENesin, 400 mg, Per G Tube, Q6H  immune globulin, human, 400 mg/kg (Adjusted), Intravenous, Q24H  levalbuterol, 1.25 mg, Nebulization, Q6H  melatonin, 3 mg, Per PEG Tube, HS  senna-docusate sodium, 1 tablet, Oral, HS  sodium phosphate, 21 mmol, Intravenous, Once      Continuous IV Infusions:     PRN Meds:  acetaminophen, 650 mg, Per PEG Tube, Q6H PRN  bisacodyl, 10 mg, Rectal, Daily PRN  fentaNYL, 50 mcg, Intravenous, Q2H PRN  oxyCODONE, 5 mg, Per PEG Tube, Q6H PRN      Discharge Plan: tbd    Vital Signs (last 3 days)       Date/Time Temp Pulse Resp BP MAP (mmHg) SpO2 FiO2 (%) O2 Device Patient Position -  Orthostatic VS Purvi Coma Scale Score Pain    10/29/24 0731 -- -- -- -- -- 96 % 40 Ventilator -- -- --    10/29/24 0600 -- 60 -- 147/87 112 95 % -- -- -- -- --    10/29/24 0500 -- 63 -- 128/76 97 95 % -- -- -- -- --    10/29/24 0400 -- -- -- -- -- -- -- -- -- 11 --    10/29/24 0310 -- -- -- -- -- 94 % -- -- -- -- --    10/29/24 0300 -- 65 -- 104/64 78 94 % -- -- -- -- --    10/29/24 0218 98.1 °F (36.7 °C) -- -- 110/66 82 -- -- -- -- -- --    10/29/24 0200 -- 72 -- 110/66 82 95 % -- -- -- -- --    10/29/24 0130 -- -- -- -- -- 96 % -- -- -- -- --    10/29/24 0100 -- 59 -- 110/67 84 95 % -- -- -- -- --    10/29/24 0045 -- -- -- -- -- 95 % -- -- -- -- --    10/29/24 0000 -- -- -- -- -- -- -- -- -- 11 No Pain    10/28/24 2300 98.8 °F (37.1 °C) 72 -- 144/93 113 97 % -- -- -- -- --    10/28/24 2200 -- 81 -- 140/91 111 94 % -- -- -- -- --    10/28/24 2100 -- 85 -- 139/88 107 93 % -- -- -- -- --    10/28/24 2006 -- -- -- -- -- 97 % -- -- -- -- --    10/28/24 2003 -- -- -- -- -- 99 % -- -- -- -- --    10/28/24 2000 -- 74 -- 138/100 115 91 % -- -- -- -- --    10/28/24 1900 -- 74 -- 148/95 117 95 % -- -- -- -- --    10/28/24 1700 -- 76 -- 148/97 117 92 % -- -- -- -- --    10/28/24 1600 -- 78 -- 141/87 109 94 % -- -- -- 11 --    10/28/24 1500 99 °F (37.2 °C) 65 18 155/96 120 100 % -- Ventilator Lying -- --    10/28/24 1300 -- 67 -- 157/87 116 97 % -- -- -- -- --    10/28/24 1200 -- 72 -- 152/94 118 97 % -- -- -- 11 --    10/28/24 1100 -- 70 -- 143/90 111 95 % -- -- -- -- --    10/28/24 1058 99.4 °F (37.4 °C) -- 18 139/88 110 -- -- Ventilator Lying -- --    10/28/24 1000 -- 67 -- 146/93 115 93 % -- -- -- -- --    10/28/24 0900 -- 71 -- 111/67 83 90 % -- -- -- -- --    10/28/24 0800 100 °F (37.8 °C) 78 14 123/75 94 90 % -- -- -- 11 No Pain    10/28/24 0700 100 °F (37.8 °C) 64 16 131/82 101 96 % -- Ventilator Lying -- --    10/28/24 0600 99.9 °F (37.7 °C) 67 -- 143/77 98 93 % -- -- -- -- --    10/28/24 0500 99.9 °F (37.7 °C) 67  -- 109/66 82 90 % -- -- -- -- --    10/28/24 0400 100 °F (37.8 °C) 78 -- 111/66 82 92 % -- -- -- 11 --    10/28/24 0355 -- -- -- -- -- -- 40 Ventilator -- -- --    10/28/24 0300 99.9 °F (37.7 °C) 73 -- 120/76 94 91 % -- -- -- -- --    10/28/24 0200 99.9 °F (37.7 °C) 62 -- 109/66 83 91 % -- -- -- -- --    10/28/24 0100 99.9 °F (37.7 °C) 72 -- 118/69 88 94 % -- -- -- -- --    10/28/24 0057 -- -- -- -- -- -- 40 Ventilator -- -- --    10/28/24 0000 99.7 °F (37.6 °C) 75 -- 150/73 99 93 % -- -- -- 11 --    10/27/24 2300 99.3 °F (37.4 °C) 64 -- 110/67 83 93 % -- -- -- -- --    10/27/24 2200 99.1 °F (37.3 °C) 73 -- 106/62 77 92 % -- -- -- -- --    10/27/24 2100 99.1 °F (37.3 °C) 81 -- 129/77 96 95 % -- -- -- -- --    10/27/24 2000 99 °F (37.2 °C) 74 -- 128/82 101 97 % -- -- -- 11 No Pain    10/27/24 1944 -- -- -- -- -- -- 40 Ventilator -- -- --    10/27/24 1900 98.6 °F (37 °C) 58 -- 117/74 91 95 % -- -- -- -- --    10/27/24 1700 98.4 °F (36.9 °C) 72 -- 144/86 110 96 % -- -- -- -- --    10/27/24 1600 98.2 °F (36.8 °C) 80 -- 118/75 92 93 % -- -- -- 11 No Pain    10/27/24 1500 98.6 °F (37 °C) 60 -- 114/67 85 93 % -- -- -- -- --    10/27/24 1400 98.4 °F (36.9 °C) 62 -- 136/83 104 93 % -- -- -- -- --    10/27/24 1300 98.4 °F (36.9 °C) 79 -- 147/92 115 94 % -- -- -- -- --    10/27/24 1226 -- -- -- -- -- 100 % -- -- -- -- --    10/27/24 1200 97.9 °F (36.6 °C) 69 14 123/77 95 91 % -- -- -- 11 No Pain    10/27/24 1145 97.7 °F (36.5 °C) 62 -- 108/65 81 91 % -- -- -- -- --    10/27/24 1130 97.9 °F (36.6 °C) 62 -- 109/65 82 92 % -- -- -- -- --    10/27/24 1115 98.1 °F (36.7 °C) 65 -- 106/65 80 93 % -- -- -- -- --    10/27/24 1100 98.2 °F (36.8 °C) 69 -- 103/64 78 94 % -- -- -- -- --    10/27/24 1045 98.4 °F (36.9 °C) 74 -- 121/72 92 95 % -- -- -- -- --    10/27/24 1030 98.4 °F (36.9 °C) 66 -- 143/88 110 100 % -- -- -- -- --    10/27/24 1029 -- -- -- -- -- -- -- -- -- -- Med Not Given for Pain - for MAR use only    10/27/24 1015 98.2 °F  (36.8 °C) 61 -- 133/74 99 94 % -- -- -- -- --    10/27/24 1000 98.2 °F (36.8 °C) 67 -- 116/72 90 89 % -- -- -- -- --    10/27/24 0945 98.4 °F (36.9 °C) 65 -- 107/70 82 89 % -- -- -- -- --    10/27/24 0930 98.4 °F (36.9 °C) 67 -- 105/68 81 89 % -- -- -- -- --    10/27/24 0915 98.4 °F (36.9 °C) 68 -- 104/61 75 90 % -- -- -- -- --    10/27/24 0900 98.6 °F (37 °C) 73 -- 102/62 75 91 % -- -- -- -- --    10/27/24 0845 99 °F (37.2 °C) 77 -- 108/58 78 90 % -- -- -- -- --    10/27/24 0830 99.1 °F (37.3 °C) 80 -- 128/79 96 95 % -- -- -- -- --    10/27/24 0815 99.1 °F (37.3 °C) 77 -- 130/84 102 97 % -- -- -- -- --    10/27/24 0800 99.1 °F (37.3 °C) 61 14 137/91 109 98 % 40 Ventilator Lying 11 No Pain    10/27/24 0745 99.1 °F (37.3 °C) 54 -- 136/84 106 97 % -- -- -- -- --    10/27/24 0730 99.3 °F (37.4 °C) 54 -- 115/72 89 94 % -- -- -- -- --    10/27/24 0715 99.5 °F (37.5 °C) 55 -- 111/70 86 94 % -- -- -- -- --    10/27/24 0700 99.7 °F (37.6 °C) 56 -- 106/67 82 94 % -- -- -- -- --    10/27/24 0645 99.9 °F (37.7 °C) 58 -- 108/65 82 94 % -- -- -- -- --    10/27/24 0500 99.3 °F (37.4 °C) 62 -- 143/98 116 100 % -- -- -- -- --    10/27/24 0415 -- -- -- -- -- -- 40 Ventilator -- -- --    10/27/24 0400 98.2 °F (36.8 °C) 57 -- 127/79 98 95 % -- -- -- 11 --    10/27/24 0300 98.4 °F (36.9 °C) 59 14 107/61 77 94 % -- -- -- -- --    10/27/24 0200 98.6 °F (37 °C) 79 -- 130/80 100 93 % -- -- -- -- --    10/27/24 0111 -- -- -- -- -- -- 40 Ventilator -- -- --    10/27/24 0100 98.1 °F (36.7 °C) 54 -- 114/71 88 94 % -- -- -- -- --    10/27/24 0000 97.9 °F (36.6 °C) 55 -- 109/68 84 94 % -- -- -- 11 --    10/26/24 2300 97.7 °F (36.5 °C) 55 14 110/68 85 94 % -- -- -- -- --    10/26/24 2200 97.7 °F (36.5 °C) 60 -- 100/64 77 91 % -- -- -- -- --    10/26/24 2100 97.9 °F (36.6 °C) 65 -- 104/64 77 90 % -- -- -- -- --    10/26/24 2016 -- -- -- -- -- -- 40 Ventilator -- -- --    10/26/24 2000 98.1 °F (36.7 °C) 56 -- 143/84 106 96 % -- -- -- 11 No Pain     10/26/24 1900 97.9 °F (36.6 °C) 54 14 130/83 102 95 % -- -- -- -- --    10/26/24 1830 97.7 °F (36.5 °C) 56 -- 129/82 100 95 % -- -- -- -- --    10/26/24 1815 97.7 °F (36.5 °C) 54 -- 137/87 107 97 % -- -- -- -- --    10/26/24 1800 97.7 °F (36.5 °C) 56 -- 129/80 98 95 % -- -- -- -- --    10/26/24 1745 97.7 °F (36.5 °C) 57 -- 125/80 96 94 % -- -- -- -- --    10/26/24 1730 97.7 °F (36.5 °C) 57 -- 131/82 101 96 % -- -- -- -- --    10/26/24 1715 97.5 °F (36.4 °C) 55 -- 140/88 109 95 % -- -- -- -- --    10/26/24 1700 97.5 °F (36.4 °C) 54 -- 137/88 108 95 % -- -- -- -- --    10/26/24 1645 97.5 °F (36.4 °C) 54 -- 131/82 102 94 % -- -- -- -- --    10/26/24 1630 97.3 °F (36.3 °C) 56 -- 130/82 100 94 % -- -- -- -- --    10/26/24 1615 97.3 °F (36.3 °C) 55 -- 126/81 99 94 % -- -- -- -- --    10/26/24 1600 97.3 °F (36.3 °C) 57 -- 119/75 92 93 % -- -- -- 11 No Pain    10/26/24 1546 -- -- -- -- -- 93 % -- -- -- -- --    10/26/24 1545 97.3 °F (36.3 °C) 57 -- 131/81 101 93 % -- -- -- -- --    10/26/24 1515 97.5 °F (36.4 °C) 60 -- 119/77 94 93 % -- -- -- -- --    10/26/24 1415 98.8 °F (37.1 °C) 78 -- 121/77 94 93 % -- -- -- -- --    10/26/24 1315 99.7 °F (37.6 °C) 88 -- 136/94 111 97 % -- -- -- -- --    10/26/24 1302 -- -- -- -- -- 99 % -- -- -- -- --    10/26/24 1247 -- -- -- -- -- 95 % -- -- -- -- --    10/26/24 1215 99.3 °F (37.4 °C) 59 -- 155/104 125 100 % -- -- -- -- --    10/26/24 1200 -- -- -- -- -- -- -- -- -- 11 No Pain    10/26/24 1115 98.8 °F (37.1 °C) 59 -- 151/97 120 100 % -- -- -- -- --    10/26/24 1015 99.1 °F (37.3 °C) 59 -- 135/84 104 96 % -- -- -- -- --    10/26/24 0915 99.7 °F (37.6 °C) 67 -- 152/98 119 89 % -- -- -- -- --    10/26/24 0815 -- 69 -- 138/90 109 95 % -- -- -- -- --    10/26/24 0800 -- -- -- -- -- -- -- -- -- 11 No Pain    10/26/24 0734 -- -- -- -- -- 96 % -- -- -- -- --    10/26/24 0728 -- -- -- -- -- 95 % -- -- -- -- --    10/26/24 0715 -- 60 -- 131/81 99 95 % -- -- -- -- --    10/26/24 0605 100 °F  (37.8 °C) 68 -- 142/88 109 93 % -- -- -- -- --    10/26/24 0505 -- 61 -- 164/94 123 97 % -- -- -- -- --    10/26/24 0434 -- -- -- -- -- -- 40 Ventilator -- -- --    10/26/24 0405 -- 61 -- 130/79 98 96 % -- -- -- -- --    10/26/24 0400 -- -- -- -- -- -- -- -- -- 11 --    10/26/24 0305 -- 75 14 143/95 115 94 % -- -- -- -- --    10/26/24 0205 99.5 °F (37.5 °C) 59 -- 131/82 102 97 % -- -- -- -- --    10/26/24 0105 -- 55 -- 132/80 101 97 % -- -- -- -- --    10/26/24 0005 -- 60 -- 121/75 92 97 % -- -- -- -- --    10/26/24 0000 -- -- -- -- -- -- -- -- -- 11 --          Weight (last 2 days)       Date/Time Weight    10/29/24 0600 102 (224.65)            Pertinent Labs/Diagnostic Results:   Radiology:  XR chest portable   Final Interpretation by Rivera Matt MD (10/27 1654)      Improved pulmonary edema with persistent pleural effusions and left basilar atelectasis.            Workstation performed: [id number]         IR lumbar puncture   Final Interpretation by Bhupinder Ohara MD (10/25 1512)   Fluoroscopic-guided diagnostic lumbar puncture.      Workstation performed: [id number]         MRI cervical spine w wo contrast   Final Interpretation by E. Alec Schoenberger, MD (10/23 1300)      Long segment of abnormal signal in the cervical and upper thoracic spine favored to represent nonspecific transverse myelitis.  No cord expansion, atrophy or abnormal enhancement.   Recommend clinical correlation and further evaluation with CSF. Recommend 3-month follow-up contrast-enhanced MRI.                  Workstation performed: [id number]         XR chest portable   Final Interpretation by Quentin Brownlee DO (10/21 1622)      Limited study.      Persistent left basilar opacity may represent atelectasis and or pneumonia.      Prominent pulmonary vasculature may be accentuated by low lung volumes versus mild volume overload.      Workstation performed: [id number]         XR chest portable ICU   Final Interpretation by Joan  Philly Mcintyre MD (10/17 2027)      Persistent extensive opacity in the left lower lobe with loss of the diaphragm which could be due to atelectasis and/or pneumonia in the appropriate clinical setting.            Workstation performed: [id number]         XR chest portable   Final Interpretation by Tobias Leon MD (10/17 1328)      Study limited by low lung volumes. Persistent dense  left  lung base opacity which may represent a combination of effusion and parenchymal opacity.            Workstation performed: [id number]         MRI brain w wo contrast   Final Interpretation by Andi Mccloud MD (10/17 0112)      Mildly restricted diffusion in the splenium of the corpus callosum and more subtly in the anterior cingulate gyri. Possible sequela of hypoxic ischemic injury, inflammatory or infectious process.      The study was marked in EPIC for immediate notification..      Workstation performed: [id number]         XR chest portable ICU   Final Interpretation by Jewel Burton MD (10/15 0521)      Opacification at both lung bases is unchanged likely due to atelectasis. Infiltrates and pleural effusions not excluded.            Workstation performed: [id number]         CT chest abdomen pelvis w contrast   Final Interpretation by Philippe Coates MD (10/11 8446)         1. Moderate bibasilar pulmonary opacification consistent with atelectasis; superimposed pneumonia not entirely excludable. Trace left greater than right pleural effusions.   2. No acute abnormality identified in the abdomen or pelvis.   3. Additional findings as noted.               Workstation performed: [id number]         XR chest portable ICU   Final Interpretation by Brendon Snyder MD (10/11 0356)      Left basilar effusion and atelectasis redemonstrated. Milder right basilar effusion/subsegmental atelectasis, also similar.         Workstation performed: [id number]         CT head wo contrast   Final Interpretation by  Andi Mccloud MD (10/09 2001)      No acute intracranial abnormality.                  Workstation performed: [id number]         XR chest portable ICU   Final Interpretation by Andrea Bai MD (10/09 0823)      Left basilar effusion and atelectasis redemonstrated. Milder right basilar effusion/subsegmental atelectasis, also similar.            Workstation performed: [id number]         XR chest portable ICU   Final Interpretation by Carlos Atkinson MD (10/07 1303)      Near complete opacification of the left hemithorax with leftward mediastinal shift, mildly improved from 10/5/2024, suggesting baseline underlying atelectasis and pleural effusion.      Note, left lung pneumonia cannot be excluded.      No pneumothorax.            Resident: SONAM KNOX I, the attending radiologist, have reviewed the images and agree with the final report above.      Workstation performed: [id number]         MRI brain w wo contrast    (Results Pending)     Cardiology:  ECG 12 lead   Final Result by Roe Carrasco MD (10/21 1037)   Sinus bradycardia   Normal ECG   When compared with ECG of 09-OCT-2024 11:56,   Nonspecific ST abnormality is no longer Present   Confirmed by Roe Carrasco ([id number]) on 10/21/2024 10:37:42 AM      Echo follow up/limited w/ contrast if indicated   Final Result by Rik Maurer MD (10/15 3377)        Left Ventricle: Left ventricular cavity size is normal. Wall thickness    is normal. The left ventricular ejection fraction is 65%. Systolic    function is normal. Wall motion is normal. Diastolic function is normal.     Right Ventricle: Right ventricular cavity size is moderately dilated.     Tricuspid Valve: There is mild regurgitation.     Prior TTE study available for comparison. Prior study date: 6/11/2024.    Changes noted when compared to prior study. Changes include: Right    ventricle is dilated now..     This was a limited study.         ECG 12 lead   Final Result by  Manuela Ayala MD (10/09 1848)   Sinus bradycardia   RSR' or QR pattern in V1 suggests right ventricular conduction delay   Nonspecific ST and T wave abnormality   Abnormal ECG   When compared with ECG of 05-OCT-2024 19:36,   Vent. rate has decreased BY  63 BPM   RSR' pattern in V1 is now Present   Confirmed by Manuela Ayala ([id number]) on 10/9/2024 6:48:48 PM        GI:  Bronchoscopy (Bedside)   Final Result by Yessy Barksdale MD (10/27 2033)      Bronchoscopy   Final Result by Yessy Barksdale MD (10/27 1337)   All observed locations appeared normal, including the upper trachea,    middle trachea, lower trachea, main prashant, left main stem, JOSÉ MANUEL, lingula,    LLL, right main stem, RUL, bronchus intermedius, RML and RLL.         RECOMMENDATION:   There is no recommended follow-up for this procedure.          I was present for the entire procedure   Diffuse airway secretions throughout all mainstem's, this was irrigated    until clear   No BAL was performed      Yessy Barksdale MD   Pulmonary and Critical Care Medicine             Bronchoscopy   Final Result by Yessy Barksdale MD (10/26 1105)   All observed locations appeared normal, including the upper trachea,    middle trachea, lower trachea, main prashant, left main stem, JOSÉ MANUEL, lingula,    LLL, right main stem, RUL, bronchus intermedius, RML and RLL.         RECOMMENDATION:   There is no recommended follow-up for this procedure.          I was present for the entire procedure      Yessy Barksdale MD   Pulmonary and Critical Care Medicine             Bronchoscopy   Final Result by Yessy Barksdale MD (10/26 1413)      Bronchoscopy   Final Result by Yessy Barksdale MD (10/25 1342)   All observed locations appeared normal, including the upper trachea,    middle trachea, lower trachea, main prashant, left main stem, JOSÉ MANUEL, lingula,    LLL, right main stem, RUL, bronchus intermedius, RML and RLL.         RECOMMENDATION:   There is no recommended follow-up for this procedure.          I was present  for the entire procedure   Yessy Barksdale MD   Pulmonary and Critical Care Medicine          Bronchoscopy   Final Result by Yessy Barksdale MD (10/21 1301)   All observed locations appeared normal, including the lower trachea, main    prashant, left main stem, JOSÉ MANUEL, lingula, LLL, right main stem, RUL, bronchus    intermedius, RML and RLL.   Bronchoalveolar lavage was performed x1 in the left main stem and the    fluid appeared cloudy and purulent         RECOMMENDATION:   There is no recommended follow-up for this procedure.          Attending attestation   Patient was preoxygenated to the ventilator and given sedation   Bronchoscopy initially performed with large bronchoscope disposable/orange    but there was difficulty getting through the tracheostomy tubing, the    inner cannula was caught and would not allow the bronchoscope to proceed.     This needed to be removed.  He did desaturate during this event as this    occluded his airway.  We changed this out with a new inner cannula along    with a medium size bronchoscope.   Air inspection showed diffuse accretions throughout the airway.   Left mainstem BAL      Yessy Barksdale MD   Pulmonary and Critical Care Medicine          Bronchoscopy   Final Result by El Sams MD (10/20 6876)   Moderate, thick and purulent secretions present in the left main stem,    left upper lobar bronchus, lingular lobar bronchus, left lower lobar    bronchus, right main stem, right upper lobar bronchus, bronchus    intermedius, right middle lobar bronchus and right lower lobar bronchus;    performed therapeutic aspiration   Erythematous, friable mucosa in the left main stem, left upper lobar    bronchus, lingular lobar bronchus, left lower lobar bronchus, right main    stem, right upper lobar bronchus, bronchus intermedius, right middle lobar    bronchus and right lower lobar bronchus         RECOMMENDATION:   There is no recommended follow-up for this procedure.       I was the supervising  physician and present for the entire procedure on    10/20/2024      There was diffuse bilateral mucous plugging of both lungs that was    suctioned and cleared.  Bilateral airway survey to the subsegmental    bronchi without any evidence of endobronchial lesions, extrinsic    compression, or active bleeding.      El Sams MD      Bronchoscopy   Final Result by El Sams MD (10/15 2479)   Left lung mucus plugging- suctioned and cleared         RECOMMENDATION:   Send trap for gram stain and culture   Continue to suction as needed and airway clearance      El Sams MD      Bronchoscopy   Final Result by Silvio Alas MD (10/06 7715)   All observed locations appeared normal, including the pharynx, larynx,    vocal cords, trachea, main prashant, left lung and right lung.   Excessive, thick and white secretions present in the lingula and LLL;    performed therapeutic aspiration         RECOMMENDATION:   Follow up cultures   Continue to monitor in ICU                 Results from last 7 days   Lab Units 10/29/24  0448 10/28/24  0452 10/27/24  0406 10/26/24  0425 10/25/24  0441   WBC Thousand/uL 8.71 7.74 7.40 5.10 9.31   HEMOGLOBIN g/dL 10.3* 9.6* 9.7* 10.4* 9.8*   HEMATOCRIT % 33.6* 30.6* 31.3* 32.2* 31.8*   PLATELETS Thousands/uL 165 189 185 195 209   TOTAL NEUT ABS Thousands/µL 7.66* 6.93  --   --   --          Results from last 7 days   Lab Units 10/29/24  0448 10/28/24  0452 10/27/24  0406 10/26/24  0425 10/25/24  0441 10/24/24  0439 10/23/24  0636   SODIUM mmol/L 134* 133* 132* 134* 134*   < > 137   POTASSIUM mmol/L 4.0 4.1 3.8 4.1 4.0   < > 4.2   CHLORIDE mmol/L 96 96 96 96 94*   < > 97   CO2 mmol/L 38* 38* 32 33* 37*   < > 34*   ANION GAP mmol/L 0* -1* 4 5 3*   < > 6   BUN mg/dL 25 20 19 14 11   < > 12   CREATININE mg/dL 0.39* 0.44* 0.44* 0.50* 0.47*   < > 0.45*   EGFR ml/min/1.73sq m 153 145 145 138 142   < > 144   CALCIUM mg/dL 8.2* 8.4 8.6 8.7 8.7   < > 8.9   CALCIUM, IONIZED mmol/L 1.15  --   1.10*  --  1.13  --  1.16   MAGNESIUM mg/dL 1.9 2.0 1.9 1.9 1.8*  --  2.0   PHOSPHORUS mg/dL 2.2* 1.9* 1.9* 2.8 3.3  --  3.1    < > = values in this interval not displayed.     Results from last 7 days   Lab Units 10/28/24  0452 10/27/24  0406   AST U/L 45* 23   ALT U/L 88* 55*   ALK PHOS U/L 76 83   TOTAL PROTEIN g/dL 7.3 7.1   ALBUMIN g/dL 2.7* 2.9*   TOTAL BILIRUBIN mg/dL 0.56 0.53         Results from last 7 days   Lab Units 10/29/24  0448 10/28/24  0452 10/27/24  0406 10/26/24  0425 10/25/24  0441 10/24/24  0439 10/23/24  0636   GLUCOSE RANDOM mg/dL 137 132 160* 165* 83 108 101       Results from last 7 days   Lab Units 10/25/24  1142   GRAM STAIN RESULT  No No organisms seen  1+ Mononuclear Cells  No Polys     Results from last 7 days   Lab Units 10/25/24  1041   TOTAL COUNTED  5     Results from last 7 days   Lab Units 10/25/24  1125 10/25/24  1041   APPEARANCE CSF   --  Clear, Colorless   TUBE NUM CSF   --  4   WBC CSF /uL  --  1   XANTHOCHROMIA   --  No   LYMPHS % (CSF) %  --  100   GLUCOSE CSF mg/dL  --  54   PROTEIN CSF mg/dL  --  32   RBC CSF uL  --  0   CSF CULTURE  No growth  --            Network Utilization Review Department  ATTENTION: Please call with any questions or concerns to [phone] and carefully listen to the prompts so that you are directed to the right person. All voicemails are confidential.   For Discharge needs, contact Care Management DC Support Team at [phone] opt. 2  Send all requests for admission clinical reviews, approved or denied determinations and any other requests to dedicated fax number below belonging to the campus where the patient is receiving treatment. List of dedicated fax numbers for the Facilities:  FACILITY NAME UR FAX NUMBER   ADMISSION DENIALS (Administrative/Medical Necessity) [phone]   DISCHARGE SUPPORT TEAM (NETWORK) [phone]   PARENT CHILD HEALTH (Maternity/NICU/Pediatrics) [phone]   [facility] [phone]    [facility] [phone]   [facility] [phone]   [facility] [phone]   [facility] [phone]   [facility] [phone]   [facility] [phone]   [facility] [phone]   [facility] [phone]   [facility] [phone]   [facility] [phone]   [facility] [phone]

## 2024-10-29 NOTE — ASSESSMENT & PLAN NOTE
Intermittent bradycardia not related to hypoxia.   He has been maintaining heart rates above 60 since medication adjustments.  Seroquel discontinue  Heart rate between episodes fluctuates from low 80s high 90s.  Continues to experience desaturations/bradycardic episodes likely vasovagal in nature  Removed scheduled benzodiazepines and opioids.  Episodes continue but have decreased in intensity     Plan  -Currently treating with manual bagging/suction   -Tapering gabapentin; not effective

## 2024-10-29 NOTE — DISCHARGE SUMMARY
Discharge Summary - Critical Care/ICU   Name: Hemanth Swanson 39 y.o. male I MRN: [id number]  Unit/Bed#: ICU 05 I Date of Admission: 10/5/2024   Date of Service: 10/31/2024 I Hospital Day: 26    Medical Problems       Resolved Problems  Date Reviewed: 10/19/2024            Resolved    Hypernatremia 10/14/2024     Resolved by  Ne Low MD        Discharging Physician / Practitioner: Trenton Pearce MD  PCP: Ela Douglas MD  Admission Date:   Admission Orders (From admission, onward)       Ordered        10/05/24 2251  Inpatient Admission  Once                          Discharge Date: 10/31/24    Consultations During Hospital Stay:  Cardiology  Neurology  PM&R  Dermatology    Procedures Performed:   LP   Bronchoscopy      Significant Findings / Test Results:   C-spine suggested of transverse myelitis versus sequelae of old infarct    Incidental Findings:   N/AA      Test Results Pending at Discharge (will require follow up):   Patient to schedule MRI in 2 weeks     Outpatient Tests Requested:  None    Complications: None    Reason for Admission: PEG tube displacement    Hospital Course:   Hemanth Swanson is a 39 y.o. male patient who originally presented to the hospital on 10/5/2024 due to displacement of PEG tube.    Hospital Course: Hemanth Swanson is a 39 y.o. 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 PE on Eliquis, progressive demyelinating neuromuscular disease trach/peg, vent dependent who presented to [facility] ED after he pulled out his peg tube, this was replaced in the ED. He was found to be febrile at 101.9, desaturating to the 80's and increased respiratory secretions, chest x-ray showed complete opacification of the left lung. Received weight based fluids, lactate normalized. Covid/flu/RSV negative, pro-killian negative. Patient had bronch 6/1/24, cultures grew serretia and pseudomonas.  Repeat labs significant for leukocytosis,  hypernatremia, hyperchloremia and hypokalemia. Patient is non verbal at baseline.  He was found to be hypernatremic to 176 and met sepsis criteria.  He was started on cefepime and vancomycin, and started on D5 plus K 20 mEq at 75 cc an hour to treat his hypernatremia.  During his hospital stay he experienced episodes where he became extremely bradycardic and desaturated extensively into the 30s.  [facility] culture revealed Proteus and Pseudomonas.  Patient's PNA was treated for 10 days with appropriate antibiotic her coverage.  Echocardiogram showed LV EF of 65% normal wall motion, RV dilated/RV systolic function normal and LA/RV normal in size.  No significant valvular disease.  Desaturations/bradycardic episodes were determined to be vasovagal in nature with the assistance of cardiology.  Episodes were treated with manual bagging and as needed Ativan.  Hypernatremia was determined to be most likely the result of lack of free water flushes and was treated with the assistance of nephrology and resolved during admission.  He was also trialed on gabapentin and scheduled benzodiazepines to see if vasovagal episodes were improving.  However, these medications did not improve the frequency of his episodes.  Therefore they were tapered off and discontinued before discharge.  Following discussion with PM&R patient obtained a C-spine which showed concern for nonacute transverse myelitis versus sequelae of old infarcts.  Neurology was reengaged and a LP was ordered.  Patient also also was worked up extensively for potential transverse myelitis etiologies.  Patient was also given IV Solu-Medrol and IVIG for 5 days.  Patient's posttreatment physical therapy evaluation shows no change. He was noted to have a rash on his back which family endorses has been intermittent throughout the year with no significant improvement with over-the-counter treatment.  Dermatology was consulted they obtained a skin punch biopsy which showed fungal  etiology primarily tinea; they recommended fluconazole 200 mg once a week for 4 weeks and microconazole 2% cream applied twice daily.  His trach was downsized from an 8 to a 6 and following this downsize he showed reduction in almost all vasovagal episodes. Family acknowledged risks associated with discharge and vehemently declined placement in any facility.   He will be discharged with a neurology neuromuscular follow-up.       Please see above list of diagnoses and related plan for additional information.     Condition at Discharge: fair    Discharge Day Visit / Exam:   * Please refer to separate progress note for these details *    Discussion with Family: Updated  (sister) via phone.    Discharge instructions/Information to patient and family:   See after visit summary for information provided to patient and family.      Provisions for Follow-Up Care:  See after visit summary for information related to follow-up care and any pertinent home health orders.      Mobility at time of Discharge:   Basic Mobility Inpatient Raw Score: 6  JH-HLM Goal: 2: Bed activities/Dependent transfer  JH-HLM Achieved: 1: Laying in bed  HLM Goal NOT achieved. Continue to encourage mobility in post discharge setting.     Disposition:   Home with VNA Services (Reminder: Complete face to face encounter)    Planned Readmission: No.     Discharge Medications:  See after visit summary for reconciled discharge medications provided to patient and/or family.      **Please Note: This note may have been constructed using a voice recognition system**

## 2024-10-29 NOTE — CASE MANAGEMENT
Case Management Progress Note    Patient name Hemanth Swanson  Location ICU 05/ICU 05 MRN [id number]  : 1985 Date 10/29/2024       LOS (days): 24  Geometric Mean LOS (GMLOS) (days):   Days to GMLOS:        OBJECTIVE:        Current admission status: Inpatient  Preferred Pharmacy:   [facility]/pharmacy #[id number] - [place] [id number] [address] [address]  Phone: [phone] Fax: [phone]    Primary Care Provider: Ela Douglas MD    Primary Insurance: [facility]  Secondary Insurance:     PROGRESS NOTE:    Script for [employer] received. CM sent to Hilario with [employer].

## 2024-10-29 NOTE — QUICK NOTE
Patient's biopsy report came back with concern for cutaneous tinea infection:   Final Diagnosis   A. Skin, upper back left, punch biopsy:     Focal subcorneal/intraepidermal neutrophil pustule, epidermal acanthosis, and rare hyphae within stratum corneum (see note).     Note: In the appropriate clinical context, the histopathologic findings are compatible with a cutaneous superficial fungal infection, particularly dermatophytosis (tinea). Clinical pathologic correlation is advised. Multiple levels examined. PAS and GMS stains were reviewed and support the diagnosis. CD3, CD20, , and  immunostains were reviewed; findings diagnostic of a hematologic malignancy are not appreciated.  If the rash were to progress/persist despite therapy, additional sampling should be sought to exclude development of a more significant disease.        Plan:  -Discontinue topical clobetasol cream  -Recommend 200mg Fluconazole weekly for 4 weeks   -Start Topical miconazole cream twice daily to the back   -Recommend discontinuing systemic steroids if at all possible; will defer to primary team

## 2024-10-29 NOTE — PROGRESS NOTES
Progress Note - Critical Care/ICU   Name: Hemanth Swanson 39 y.o. male I MRN: [id number]  Unit/Bed#: ICU 05 I Date of Admission: 10/5/2024   Date of Service: 10/29/2024 I Hospital Day: 24      Assessment & Plan  Toxic metabolic encephalopathy  Intermittent periods of agitation, restless however alert oriented x 4 without loss of consciousness over the past few days  MRI on 10/16 with hypoxic brain injury findings  MRI C-Spine on 10/23: Findings favor nonspecific transverse myelitis; no sign of acuity due to lack of cord expansion. Unable to rule out ramifications from prior ischemia.  Goals of care discussions result: continue with disease treatment palliative care has been follow now signed off as goals of care are clear.  Neurology following recommendations to repeat paraneoplastic workup and an MRI in about a month  [id number]: Culture/stain 2+ poly, 2+ gram-positive rods, 1+ gram-negative rods growing.    Copper 96 ug/dL                                   CSF Gram stain +1 CSF fluid to is clear glucose of 54, protein 32  CSF culture: No growth  Plan  Continue monitor mental status  Per family patient is at baseline events has been persistent over the past year  Regards to restless agitation bearing-down has responded to gabapentin now at 300 TID and Ativan as needed; episodes have reduced intensity   Continue monitoring metabolic derangement  Completed 10-day course of antibiotics for Pseudomonas and Proteus mirabilis pneumonia  Monitor off of antibiotics likely colonization   Transverse myelitis w/u ordered: Serum NMO IgG autoantibodies, copper level, Sjogren's antibodies, vitamin B12, folate, vitamin B1, RPR, Lyme, vitamin D 25; largely WNL exception Vit. D0 MS panel and MOG still pending.   LP completed f/u pending results; STAT Gram stain showing 1+ mononuclear cells, ME panel negative  Chronic respiratory failure with hypoxia and hypercapnia (HCC)  Neuromuscular disease post chemotherapyS/P  tracheostomy in 9/2023.  He continues to fail SBT likely in the setting of diaphragmatic weakness in the setting of progressive demyelinating/ neuromuscular disease  Goals of care discussions continues disease driven  X-ray showed worsening opacities 10/21  Multiple bronchoscopies resulted thick mucus secretions  Sputum/bronc culture showing growth of Pseudomonas (s/p 10 days treatment for Pseudomonas/Proteus)  1026 on BiPAP satting 100% PEEP of 8  Patient is drowsy  Patient tried and failed to produce sputum on his own hypertonic saline, chest PT and suctioning.  Family/caregivers were unable to perform this at home due to physical limitations.  Patient's cognitive level is also contributing to the ability to mobilize secretions, increasing the risk for further infections and hospitalization.    Plan:  Continue vent support  Will monitor lethargy while on BiPaP   Xopnex and hypertonic saline nebs 3 times daily  Re-started chest therapy (previously halted due to concern they were precipitating vasovagal episodes)  Consider bronchoscopy for symptomatic relief as needed    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HCC)  Noted with abnormal MRI concerning for hypoxic injury  Neurology on board recommendations to repeat paraneoplastic workup repeat MRI in about a month  MRI-C Spine: Transverse myelitis not acute no cord expansion versus sequelae from old ischemia.    Plan  Continue Solu-Medrol today is day 5 for 5 days   Continue IgG today is day 5  Bradycardia  Intermittent bradycardia not related to hypoxia.   He has been maintaining heart rates above 60 since medication adjustments.  Seroquel discontinue  Heart rate between episodes fluctuates from low 80s high 90s.  Continues to experience desaturations/bradycardic episodes likely vasovagal in nature  Removed scheduled benzodiazepines and opioids.  Episodes continue but have decreased in intensity     Plan  -Currently treating with manual bagging/suction   -Tapering gabapentin; not  effective  Sepsis (HCC)  Sepsis likely secondary to multifocal pneumonia in the setting of Proteus and Pseudomonas day 10 on antibiotics today for completion.   Now shock undifferentiated consider hypovolemic in the setting of insensible losses, echocardiogram has been remained unremarkable doubt sepsis at this time given adequate treatment on antibiotics for 10 days  Blood pressure remained with maps above 70 overnight.  He did not require pressors.  Will consider POCUS disease continuing evaluate IVC urine output has slowed down    Plan  If pressures are low, will trial of 500 cc Isolyte with monitoring urinary output.   Seizure-like activity (HCC)  No evidence of seizure on EMU on multiple admissions including current one  Seizure-like activity is likely stereotypical behaviors likely related to agitation; responding well to Ativan as needed  Umbilical hernia without obstruction and without gangrene  Evaluated by surgery.  Found to not be a surgical candidate due to ongoing comorbidities.  Fat containing incarcerated hernia without bowel.  Palliative care by specialist  Palliative care is following.  Had long discussion with family about goals of care.  They would like to continue all measures.  The are willing to care for him at home.  They reiterated their wishes that he would not like to be sent to a rehab facility.  Goals of care, counseling/discussion  Goals of care discussions multiple admissions including current 1 palliative care follow-up continues with disease driven treatments  Discussed with mother, sister patient unfortunately continues with poor prognosis  Patient has 24/7 support at home  Case management following assistance upon discharge   Rash of back  Patient has a persistent rash on his left back.  Does not cross the midline but expands across several dermatomes.  Rash is papular/pustule and crusted in places.  Rash was first noted over a week ago on this admission but patient's sister  endorses rash being present over the course of a year.  They have tried over-the-counter creams for relief with no significant improvement.      Plan  Dermatology consulted, on board  2 punch biopsies were obtained cervical left shoulder  Will follow-up results  Temovate applied twice daily for no more than 2 weeks  Disposition: Critical care    ICU Core Measures     Vented Patient  VAP Bundle  VAP bundle ordered     A: Assess, Prevent, and Manage Pain Has pain been assessed? Yes  Need for changes to pain regimen? No   B: Both Spontaneous Awakening Trials (SATs) and Spontaneous Breathing Trials (SBTs) Plan to perform spontaneous awakening trial today? N/A   Plan to perform spontaneous breathing trial today? N/A   Obvious barriers to extubation? Yes   C: Choice of Sedation RASS Goal: 0 Alert and Calm  Need for changes to sedation or analgesia regimen? No   D: Delirium CAM-ICU: Negative   E: Early Mobility  Plan for early mobility? Yes   F: Family Engagement Plan for family engagement today? Yes       Review of Invasive Devices:      Central access plan: Medications requiring central line      Prophylaxis:  VTE VTE covered by:  apixaban, Per PEG Tube, 5 mg at 10/28/24 1751       Stress Ulcer  not ordered         24 Hour Events : Patient continued to have vasovagal events otherwise seen significant.  Subjective patient was alert this morning calm and cooperative with all commands.  He seemed to have more strength and was able to squeeze my fingers.      Objective :                   Vitals I/O      Most Recent Min/Max in 24hrs   Temp 98.1 °F (36.7 °C) Temp  Min: 98.1 °F (36.7 °C)  Max: 100 °F (37.8 °C)   Pulse 60 Pulse  Min: 59  Max: 85   Resp 18 Resp  Min: 14  Max: 18   /87 BP  Min: 104/64  Max: 157/87   O2 Sat 95 % SpO2  Min: 90 %  Max: 100 %      Intake/Output Summary (Last 24 hours) at 10/29/2024 0709  Last data filed at 10/29/2024 0601  Gross per 24 hour   Intake 2483 ml   Output 530 ml   Net 1953 ml        Diet Enteral/Parenteral; Tube Feeding No Oral Diet; Jevity 1.5; Continuous; 70; 150; Water; Every 4 hours    Invasive Monitoring           Physical Exam   Physical Exam  Eyes:      General: No foreign body in eyeNo scleral icterus.        Right eye: No discharge.         Left eye: No discharge.   Skin:     Findings: Rash and wound present.   HENT:      Head: Normocephalic.      Nose: No rhinorrhea.   Neck:      Trachea: Tracheostomy present.   Cardiovascular:      Rate and Rhythm: Normal rate and regular rhythm.   Musculoskeletal:         General: Swelling present.      Right lower leg: Trace Edema present.      Left lower leg: Trace Edema present.   Abdominal: General: Bowel sounds are normal. There is no distension.      Tenderness: There is no abdominal tenderness.      Hernia: A hernia is present.   Constitutional:       Appearance: He is well-developed. He is ill-appearing.      Interventions: He is intubated.   Pulmonary:      Effort: Pulmonary effort is normal. He is intubated.      Breath sounds: Rhonchi present.   Neurological:      Mental Status: He is alert. He is calm.   Genitourinary/Anorectal:  external catheter present.        Diagnostic Studies        Lab Results: I have reviewed the following results:     Medications:  Scheduled PRN   acetylcysteine, 3 mL, Q6H  apixaban, 5 mg, BID  chlorhexidine, 15 mL, Q12H ISAEL  clobetasol, , BID  clonazePAM, 0.5 mg, HS  gabapentin, 200 mg, Q8H   Followed by  [START ON 10/30/2024] gabapentin, 100 mg, Q8H   Followed by  [START ON 11/1/2024] gabapentin, 100 mg, Q12H   Followed by  [START ON 11/3/2024] gabapentin, 100 mg, HS  guaiFENesin, 400 mg, Q6H  immune globulin, human, 400 mg/kg (Adjusted), Q24H  levalbuterol, 1.25 mg, Q6H  magnesium sulfate, 2 g, Once  melatonin, 3 mg, HS  methylPREDNISolone sodium succinate, 1,000 mg, Daily      acetaminophen, 650 mg, Q6H PRN  bisacodyl, 10 mg, Daily PRN  fentaNYL, 50 mcg, Q2H PRN  LORazepam, 2 mg, Q4H PRN  oxyCODONE, 5  mg, Q6H PRN       Continuous          Labs:   CBC    Recent Labs     10/28/24  0452 10/29/24  0448   WBC 7.74 8.71   HGB 9.6* 10.3*   HCT 30.6* 33.6*    165     BMP    Recent Labs     10/28/24  0452 10/29/24  0448   SODIUM 133* 134*   K 4.1 4.0   CL 96 96   CO2 38* 38*   AGAP -1* 0*   BUN 20 25   CREATININE 0.44* 0.39*   CALCIUM 8.4 8.2*       Coags    No recent results     Additional Electrolytes  Recent Labs     10/28/24  0452 10/29/24  0448   MG 2.0 1.9   PHOS 1.9* 2.2*   CAIONIZED  --  1.15          Blood Gas    No recent results  No recent results LFTs  Recent Labs     10/28/24  0452   ALT 88*   AST 45*   ALKPHOS 76   ALB 2.7*   TBILI 0.56       Infectious  No recent results  Glucose  Recent Labs     10/28/24  0452 10/29/24  0448   GLUC 132 137

## 2024-10-29 NOTE — HOSPITAL COURSE
Hemanth Swanson is a 39 y.o. 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 PE on Eliquis, progressive demyelinating neuromuscular disease trach/peg, vent dependent who presented to [place] ED after he pulled out his peg tube, this was replaced in the ED. He was found to be febrile at 101.9, desaturating to the 80's and increased respiratory secretions, chest x-ray showed complete opacification of the left lung. Received weight based fluids, lactate normalized. Covid/flu/RSV negative, pro-killian negative. Patient had bronch 6/1/24, cultures grew serretia and pseudomonas.  Repeat labs significant for leukocytosis, hypernatremia, hyperchloremia and hypokalemia. Patient is non verbal at baseline.  He was found to be hypernatremic to 176 and met sepsis criteria.  He was started on cefepime and vancomycin, and started on D5 plus K 20 mEq at 75 cc an hour to treat his hypernatremia.  During his hospital stay he experienced episodes where he became extremely bradycardic and desaturated extensively into the 30s.  [facility] culture revealed Proteus and Pseudomonas.  Patient's PNA was treated for 10 days with appropriate antibiotic her coverage.  Echocardiogram showed LV EF of 65% normal wall motion, RV dilated/RV systolic function normal and LA/RV normal in size.  No significant valvular disease.  Desaturations/bradycardic episodes were determined to be vasovagal in nature with the assistance of cardiology.  Episodes were treated with manual bagging and as needed Ativan.  Hypernatremia was determined to be most likely the result of lack of free water flushes and was treated with the assistance of nephrology and resolved during admission.  He was also trialed on gabapentin and scheduled benzodiazepines to see if vasovagal episodes were improving.  However, these medications did not improve the frequency of his episodes.  Therefore they were tapered off and discontinued before  discharge.  Following discussion with PM&R patient obtained a C-spine which showed concern for nonacute transverse myelitis versus sequelae of old infarcts.  Neurology was reengaged and a LP was ordered.  Patient also also was worked up extensively for potential transverse myelitis etiologies.  Patient was also given IV Solu-Medrol and IVIG for 5 days.  Patient's posttreatment physical therapy evaluation shows no change. He was noted to have a rash on his back which family endorses has been intermittent throughout the year with no significant improvement with over-the-counter treatment.  Dermatology was consulted they obtained a skin punch biopsy which showed fungal etiology primarily tinea; they recommended fluconazole 200 mg once a week for 4 weeks and microconazole 2% cream applied twice daily.  His trach was downsized from an 8 to a 6 and following this downsize he showed reduction in almost all vasovagal episodes. Family acknowledged risks associated with discharge and vehemently declined placement in any facility.   He will be discharged with a neurology neuromuscular follow-up.

## 2024-10-29 NOTE — CASE MANAGEMENT
Case Management Discharge Planning Note    Patient name Hemanth Swanson  Location ICU 05/ICU 05 MRN [id number]  : 1985 Date 10/29/2024       Current Admission Date: 10/5/2024  Current Admission Diagnosis:Toxic metabolic encephalopathy   Patient Active Problem List    Diagnosis Date Noted Date Diagnosed    Rash of back 10/25/2024     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HCC) 10/17/2024     Palliative care by specialist 10/11/2024     Goals of care, counseling/discussion 10/11/2024     Toxic metabolic encephalopathy 10/07/2024     Cardiac arrest due to other underlying condition (HCC) 2024     Seizure-like activity (HCC) 2024     Bradycardia 2024     Ventilator dependent (HCC) 2024     Obesity hypoventilation syndrome (HCC) 10/24/2023     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     Psychophysiological insomnia 10/18/2023     Impaired mobility 2023     Status post tracheostomy ([facility]) 2023     S/P percutaneous endoscopic gastrostomy (PEG) tube placement ([facility]) 2023     Anxiety 2023     Chronic respiratory failure with hypoxia and hypercapnia ([facility]) 2023     Sepsis (HCC) 2023     Acute pulmonary embolism without acute cor pulmonale (HCC) 2023     Depression 2023     History of LVH (left ventricular hypertrophy) 2023     Pure hypertriglyceridemia 2023     Unilateral vestibular schwannoma (HCC) 2023     Port-A-Cath in place 2023     Diplopia 2023     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HCC) 2023     Umbilical hernia without obstruction and without gangrene 12/10/2022     Tobacco use 12/10/2022     Retroperitoneal lymphadenopathy 12/10/2022       LOS (days): 24  Geometric Mean LOS (GMLOS) (days):   Days to GMLOS:     OBJECTIVE:  Risk of Unplanned Readmission Score: 43.37         Current admission status: Inpatient   Preferred Pharmacy:   [facility]/pharmacy #[id number] - [place] [address]  [place] [id number]  Phone: [phone] Fax: [phone]    Primary Care Provider: Ela Douglas MD    [facility] Insurance: [facility]  Secondary Insurance:     DISCHARGE DETAILS:    Discharge planning discussed with:: Pt's sisterDmitry  [place] of Choice: Yes  Comments - Freedom of Choice: Return Home with [facility] for PT/OT    Contacts  Patient Contacts: Dmitry Swanson (sister)  Relationship to Patient:: Family  Contact Method: Phone  Phone Number: [phone]  Reason/Outcome: Discharge Planning, Continuity of Care, Emergency Contact, Referral    Requested Home Health Care         Is the patient interested in HHC at discharge?: Yes  Home Health Discipline requested:: Occupational Therapy, Physical Therapy  Home Health Agency Name:: [facility]  HHA External Referral Reason (only applicable if external HHA name selected): [facility] does not accept patient's insurance  Home Health Follow-Up Provider:: PCP  Home Health Services Needed:: Evaluate Functional Status and Safety, Gait/ADL Training, Strengthening/Theraputic Exercises to Improve Function  Homebound Criteria Met:: Requires the Assistance of Another Person for Safe Ambulation or to Leave the Home, Uses an Assist Device (i.e. cane, walker, etc)  Supporting Clincal Findings:: Bed Bound or Wheelchair Bound    Other Referral/Resources/Interventions Provided:  Interventions: HHC  Referral Comments: RISHABH spoke with Dmitry. NOtified her that Hilario with [facility] plans to come tomorrow morning to change the home VENT settings. Tube feeds have been ordered and expect delivery for tomorrow. Dmitry does have pump and additional tube feeds from previous admission. Dmitry requesting some assistance with the pump at home and setting up the settings. RISHABH sent message to [facility]. Dmitry aware that RISHABH will arrange transport for home tomorrow as long as the team clears him for dc. She is aware that this will likely be later in the afternoon. Aware  CM will update her with time. Dmitry confirmed address on facesheet is correct and has ramp entrance.    Treatment Team Recommendation: Home  Discharge Destination Plan:: Home

## 2024-10-29 NOTE — CASE MANAGEMENT
Case Management Progress Note    Patient name Hemanth Swanson  Location ICU 05/ICU 05 MRN [id number]  : 1985 Date 10/29/2024       LOS (days): 24  Geometric Mean LOS (GMLOS) (days):   Days to GMLOS:        OBJECTIVE:        Current admission status: Inpatient  Preferred Pharmacy:   [facility]/pharmacy #[id number] - [place], [place] - [address] [id number] [facility] [address]  Phone: [phone] Fax: [phone]    Primary Care Provider: Ela Douglas MD    Primary Insurance: [facility]  Secondary Insurance:     PROGRESS NOTE:    Update VENT script obtained from physician and send to Hilario -  with [employer] for pt's home vent to be updated. Hilario aware of anticipated dc for tomorrow. As per Hilario plan to be at bedside between 9/9:30 to adjust home VENT settings.    RISHABH sent message to [employer] via [place] for update on tube feed delivery.     Update [facility] of anticipated dc for tomorrow.

## 2024-10-30 LAB
ANION GAP SERPL CALCULATED.3IONS-SCNC: 0 MMOL/L (ref 4–13)
BUN SERPL-MCNC: 27 MG/DL (ref 5–25)
CA-I BLD-SCNC: 1.12 MMOL/L (ref 1.12–1.32)
CALCIUM SERPL-MCNC: 8.3 MG/DL (ref 8.4–10.2)
CHLORIDE SERPL-SCNC: 96 MMOL/L (ref 96–108)
CO2 SERPL-SCNC: 40 MMOL/L (ref 21–32)
CREAT SERPL-MCNC: 0.39 MG/DL (ref 0.6–1.3)
ERYTHROCYTE [DISTWIDTH] IN BLOOD BY AUTOMATED COUNT: 16.9 % (ref 11.6–15.1)
GFR SERPL CREATININE-BSD FRML MDRD: 153 ML/MIN/1.73SQ M
GLUCOSE SERPL-MCNC: 135 MG/DL (ref 65–140)
HCT VFR BLD AUTO: 34.3 % (ref 36.5–49.3)
HGB BLD-MCNC: 11 G/DL (ref 12–17)
LYMPHOCYTES # BLD AUTO: 0.61 THOUSAND/UL (ref 0.6–4.47)
LYMPHOCYTES # BLD AUTO: 3 %
MAGNESIUM SERPL-MCNC: 2 MG/DL (ref 1.9–2.7)
MCH RBC QN AUTO: 31.3 PG (ref 26.8–34.3)
MCHC RBC AUTO-ENTMCNC: 32.1 G/DL (ref 31.4–37.4)
MCV RBC AUTO: 97 FL (ref 82–98)
MISCELLANEOUS LAB TEST RESULT: NORMAL
MONOCYTES # BLD AUTO: 1.22 THOUSAND/UL (ref 0–1.22)
MONOCYTES NFR BLD AUTO: 6 % (ref 4–12)
MYELOCYTES # BLD MANUAL: 0.2 THOUSAND/UL (ref 0–0.1)
MYELOCYTES NFR BLD MANUAL: 1 % (ref 0–1)
NEUTS SEG # BLD: 18.23 THOUSAND/UL (ref 1.81–6.82)
NEUTS SEG NFR BLD AUTO: 90 %
PHOSPHATE SERPL-MCNC: 2 MG/DL (ref 2.7–4.5)
PLATELET # BLD AUTO: 158 THOUSANDS/UL (ref 149–390)
PLATELET BLD QL SMEAR: ADEQUATE
PMV BLD AUTO: 10.6 FL (ref 8.9–12.7)
POLYCHROMASIA BLD QL SMEAR: PRESENT
POTASSIUM SERPL-SCNC: 3.9 MMOL/L (ref 3.5–5.3)
RBC # BLD AUTO: 3.52 MILLION/UL (ref 3.88–5.62)
RBC MORPH BLD: PRESENT
SELENIUM SERPL-MCNC: NORMAL UG/L (ref 93–198)
SODIUM SERPL-SCNC: 136 MMOL/L (ref 135–147)
TOTAL CELLS COUNTED SPEC: 100
WBC # BLD AUTO: 20.26 THOUSAND/UL (ref 4.31–10.16)

## 2024-10-30 PROCEDURE — 80048 BASIC METABOLIC PNL TOTAL CA: CPT

## 2024-10-30 PROCEDURE — 85027 COMPLETE CBC AUTOMATED: CPT

## 2024-10-30 PROCEDURE — NC001 PR NO CHARGE: Performed by: INTERNAL MEDICINE

## 2024-10-30 PROCEDURE — 99231 SBSQ HOSP IP/OBS SF/LOW 25: CPT | Performed by: OTOLARYNGOLOGY

## 2024-10-30 PROCEDURE — 94150 VITAL CAPACITY TEST: CPT

## 2024-10-30 PROCEDURE — 94760 N-INVAS EAR/PLS OXIMETRY 1: CPT

## 2024-10-30 PROCEDURE — 99232 SBSQ HOSP IP/OBS MODERATE 35: CPT

## 2024-10-30 PROCEDURE — 97530 THERAPEUTIC ACTIVITIES: CPT

## 2024-10-30 PROCEDURE — 84100 ASSAY OF PHOSPHORUS: CPT

## 2024-10-30 PROCEDURE — 94003 VENT MGMT INPAT SUBQ DAY: CPT

## 2024-10-30 PROCEDURE — 82330 ASSAY OF CALCIUM: CPT

## 2024-10-30 PROCEDURE — 99232 SBSQ HOSP IP/OBS MODERATE 35: CPT | Performed by: INTERNAL MEDICINE

## 2024-10-30 PROCEDURE — 85007 BL SMEAR W/DIFF WBC COUNT: CPT

## 2024-10-30 PROCEDURE — 0B21XFZ CHANGE TRACHEOSTOMY DEVICE IN TRACHEA, EXTERNAL APPROACH: ICD-10-PCS | Performed by: INTERNAL MEDICINE

## 2024-10-30 PROCEDURE — 31575 DIAGNOSTIC LARYNGOSCOPY: CPT | Performed by: OTOLARYNGOLOGY

## 2024-10-30 PROCEDURE — 94668 MNPJ CHEST WALL SBSQ: CPT

## 2024-10-30 PROCEDURE — 94669 MECHANICAL CHEST WALL OSCILL: CPT

## 2024-10-30 PROCEDURE — 0BJ18ZZ INSPECTION OF TRACHEA, VIA NATURAL OR ARTIFICIAL OPENING ENDOSCOPIC: ICD-10-PCS | Performed by: OTOLARYNGOLOGY

## 2024-10-30 PROCEDURE — 83735 ASSAY OF MAGNESIUM: CPT

## 2024-10-30 PROCEDURE — 94640 AIRWAY INHALATION TREATMENT: CPT

## 2024-10-30 PROCEDURE — 31502 CHANGE OF WINDPIPE AIRWAY: CPT

## 2024-10-30 RX ORDER — FENTANYL CITRATE 50 UG/ML
50 INJECTION, SOLUTION INTRAMUSCULAR; INTRAVENOUS ONCE
Status: DISCONTINUED | OUTPATIENT
Start: 2024-10-30 | End: 2024-10-30

## 2024-10-30 RX ORDER — ACETYLCYSTEINE 200 MG/ML
3 SOLUTION ORAL; RESPIRATORY (INHALATION)
Qty: 360 ML | Refills: 0 | Status: SHIPPED | OUTPATIENT
Start: 2024-10-30 | End: 2024-11-29

## 2024-10-30 RX ORDER — GABAPENTIN 250 MG/5ML
SOLUTION ORAL
Qty: 24 ML | Refills: 0 | Status: SHIPPED | OUTPATIENT
Start: 2024-10-30 | End: 2024-11-05

## 2024-10-30 RX ORDER — FLUCONAZOLE 40 MG/ML
200 POWDER, FOR SUSPENSION ORAL WEEKLY
Qty: 15 ML | Refills: 0 | Status: SHIPPED | OUTPATIENT
Start: 2024-11-05 | End: 2024-11-20

## 2024-10-30 RX ORDER — LEVALBUTEROL INHALATION SOLUTION 1.25 MG/3ML
1.25 SOLUTION RESPIRATORY (INHALATION)
Qty: 360 ML | Refills: 0 | Status: SHIPPED | OUTPATIENT
Start: 2024-10-30 | End: 2024-11-29

## 2024-10-30 RX ORDER — MICONAZOLE NITRATE 20 MG/G
CREAM TOPICAL 2 TIMES DAILY
Qty: 60 G | Refills: 1 | Status: SHIPPED | OUTPATIENT
Start: 2024-10-30 | End: 2024-11-29

## 2024-10-30 RX ADMIN — LEVALBUTEROL HYDROCHLORIDE 1.25 MG: 1.25 SOLUTION RESPIRATORY (INHALATION) at 07:31

## 2024-10-30 RX ADMIN — ACETYLCYSTEINE 600 MG: 200 SOLUTION ORAL; RESPIRATORY (INHALATION) at 07:31

## 2024-10-30 RX ADMIN — APIXABAN 5 MG: 5 TABLET, FILM COATED ORAL at 08:08

## 2024-10-30 RX ADMIN — FENTANYL CITRATE 50 MCG: 50 INJECTION INTRAMUSCULAR; INTRAVENOUS at 14:38

## 2024-10-30 RX ADMIN — ACETYLCYSTEINE 600 MG: 200 SOLUTION ORAL; RESPIRATORY (INHALATION) at 01:51

## 2024-10-30 RX ADMIN — LEVALBUTEROL HYDROCHLORIDE 1.25 MG: 1.25 SOLUTION RESPIRATORY (INHALATION) at 01:51

## 2024-10-30 RX ADMIN — ACETYLCYSTEINE 600 MG: 200 SOLUTION ORAL; RESPIRATORY (INHALATION) at 14:00

## 2024-10-30 RX ADMIN — CHLORHEXIDINE GLUCONATE 15 ML: 1.2 RINSE ORAL at 08:08

## 2024-10-30 RX ADMIN — MICONAZOLE NITRATE: 20 CREAM TOPICAL at 17:06

## 2024-10-30 RX ADMIN — GABAPENTIN 100 MG: 250 SOLUTION ORAL at 22:13

## 2024-10-30 RX ADMIN — CHLORHEXIDINE GLUCONATE 15 ML: 1.2 RINSE ORAL at 22:13

## 2024-10-30 RX ADMIN — GABAPENTIN 100 MG: 250 SOLUTION ORAL at 17:00

## 2024-10-30 RX ADMIN — POTASSIUM PHOSPHATE, MONOBASIC AND POTASSIUM PHOSPHATE, DIBASIC 21 MMOL: 224; 236 INJECTION, SOLUTION, CONCENTRATE INTRAVENOUS at 08:15

## 2024-10-30 RX ADMIN — MICONAZOLE NITRATE: 20 CREAM TOPICAL at 08:11

## 2024-10-30 RX ADMIN — Medication 3 MG: at 22:12

## 2024-10-30 RX ADMIN — FUROSEMIDE 40 MG: 10 SOLUTION ORAL at 08:09

## 2024-10-30 RX ADMIN — LEVALBUTEROL HYDROCHLORIDE 1.25 MG: 1.25 SOLUTION RESPIRATORY (INHALATION) at 14:00

## 2024-10-30 RX ADMIN — ACETYLCYSTEINE 600 MG: 200 SOLUTION ORAL; RESPIRATORY (INHALATION) at 20:10

## 2024-10-30 RX ADMIN — ACETAMINOPHEN 650 MG: 650 SUSPENSION ORAL at 22:12

## 2024-10-30 RX ADMIN — GABAPENTIN 200 MG: 250 SOLUTION ORAL at 08:09

## 2024-10-30 RX ADMIN — LEVALBUTEROL HYDROCHLORIDE 1.25 MG: 1.25 SOLUTION RESPIRATORY (INHALATION) at 20:10

## 2024-10-30 RX ADMIN — CLONAZEPAM 0.5 MG: 0.5 TABLET ORAL at 22:12

## 2024-10-30 RX ADMIN — APIXABAN 5 MG: 5 TABLET, FILM COATED ORAL at 17:06

## 2024-10-30 NOTE — PLAN OF CARE
Problem: PHYSICAL THERAPY ADULT  Goal: Performs mobility at highest level of function for planned discharge setting.  See evaluation for individualized goals.  Description: Treatment/Interventions: LE strengthening/ROM, Therapeutic exercise, Endurance training, Bed mobility          See flowsheet documentation for full assessment, interventions and recommendations.  Note: Prognosis: Guarded  Problem List: Decreased strength, Decreased range of motion, Decreased mobility, Decreased cognition  Assessment: PT treatment provided today to address deficits of: impaired strength, impaired mobility and to reassess pt's strength/mobility post IVIG treatment intervention. Compared to previous session, pt did not appear to improve in RUE strength w/ decreased strength in his LUE. Additionally, pt demonstrated overall decreased strength in his BLE more pronounced in the RLE compared to previous evaluation. However, pt was able to tolerate bilateral rolling bed mobility w/o respiratory event. Pt remains limited in functional mobility due to impaired cognition, impaired activity tolerance, generalized weakness/deconditioning, impaired strength, and medical status. Pt will continue benefit from PT to promote independence w/ functional mobility, address mobility and strength deficits, and progress towards set goals. Recommend DC w/ level: II (Moderate Rehab Resource Intensity) when medically cleared.        Rehab Resource Intensity Level, PT: II (Moderate Resource Intensity)    See flowsheet documentation for full assessment.

## 2024-10-30 NOTE — PROGRESS NOTES
Progress Note - Critical Care/ICU   Name: Hemanth Swanson 39 y.o. male I MRN: [id number]  Unit/Bed#: ICU 05 I Date of Admission: 10/5/2024   Date of Service: 10/30/2024 I Hospital Day: 25       Assessment & Plan  Toxic metabolic encephalopathy  Intermittent periods of agitation, restless however alert oriented x 4 without loss of consciousness over the past few days  MRI on 10/16 with hypoxic brain injury findings  MRI C-Spine on 10/23: Findings favor nonspecific transverse myelitis; no sign of acuity due to lack of cord expansion. Unable to rule out ramifications from prior ischemia.  Goals of care discussions result: continue with disease treatment palliative care has been follow now signed off as goals of care are clear.  Neurology following recommendations to repeat paraneoplastic workup and an MRI in about a month  [id number]/21: Culture/stain 2+ poly, 2+ gram-positive rods, 1+ gram-negative rods growing.    Copper 96 ug/dL                                   CSF Gram stain +1 CSF fluid to is clear glucose of 54, protein 32  CSF culture: Growth in broth culture only Candida albicans  Plan  Continue monitor mental status  Per family patient is at baseline events has been persistent over the past year  Regards to restless agitation bearing-down has responded to gabapentin now at 300 TID and Ativan as needed; episodes have reduced intensity   Continue monitoring metabolic derangement  Completed 10-day course of antibiotics for Pseudomonas and Proteus mirabilis pneumonia  Monitor off of antibiotics likely colonization   Transverse myelitis w/u ordered: Serum NMO IgG autoantibodies, copper level, Sjogren's antibodies, vitamin B12, folate, vitamin B1, RPR, Lyme, vitamin D 25; largely WNL exception Vit. D0 MS panel and MOG still pending.   LP completed f/u pending results; STAT Gram stain showing 1+ mononuclear cells, ME panel negative  Chronic respiratory failure with hypoxia and hypercapnia (HCC)  Neuromuscular  disease post chemotherapyS/P tracheostomy in 9/2023.  He continues to fail SBT likely in the setting of diaphragmatic weakness in the setting of progressive demyelinating/ neuromuscular disease  Goals of care discussions continues disease driven  X-ray showed worsening opacities 10/21  Multiple bronchoscopies resulted thick mucus secretions  Sputum/bronc culture showing growth of Pseudomonas (s/p 10 days treatment for Pseudomonas/Proteus)  Patient is drowsy  Patient tried and failed to produce sputum on his own hypertonic saline, chest PT and suctioning.  Family/caregivers were unable to perform this at home due to physical limitations.  Patient's cognitive level is also contributing to the ability to mobilize secretions, increasing the risk for further infections and hospitalization.    10/30: Patient's WBC up trended; will obtain a differential due to recent steroid usage    Plan:  Continue vent support  Will monitor lethargy while on BiPaP   Xopnex and hypertonic saline nebs 3 times daily  Re-started chest therapy (previously halted due to concern they were precipitating vasovagal episodes)  Consider bronchoscopy for symptomatic relief as needed    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HCC)  Noted with abnormal MRI concerning for hypoxic injury  Neurology on board recommendations to repeat paraneoplastic workup repeat MRI in about a month  MRI-C Spine: Transverse myelitis not acute no cord expansion versus sequelae from old ischemia.    Plan  Solu-Medrol today 5 days completed  IVIG today 5 days completed  Bradycardia  Intermittent bradycardia not related to hypoxia.   He has been maintaining heart rates above 60 since medication adjustments.  Seroquel discontinue  Heart rate between episodes fluctuates from low 80s high 90s.  Continues to experience desaturations/bradycardic episodes likely vasovagal in nature  Removed scheduled benzodiazepines and opioids.  Episodes continue but have decreased in intensity     Plan  -Currently  treating with manual bagging/suction, verbal encouragement  -Tapering gabapentin; not effective  Sepsis (HCC)  Sepsis likely secondary to multifocal pneumonia in the setting of Proteus and Pseudomonas day 10 on antibiotics today for completion.   Now shock undifferentiated consider hypovolemic in the setting of insensible losses, echocardiogram has been remained unremarkable doubt sepsis at this time given adequate treatment on antibiotics for 10 days  Blood pressure remained with maps above 70 overnight.  He did not require pressors.  Will consider POCUS disease continuing evaluate IVC urine output has slowed down    WBC up trended today will obtain differential.     Plan  If pressures are low, will trial of 500 cc Isolyte with monitoring urinary output.   Seizure-like activity (HCC)  No evidence of seizure on EMU on multiple admissions including current one  Seizure-like activity is likely stereotypical behaviors likely related to agitation; responding well to Ativan as needed  Umbilical hernia without obstruction and without gangrene  Evaluated by surgery.  Found to not be a surgical candidate due to ongoing comorbidities.  Fat containing incarcerated hernia without bowel.  Palliative care by specialist  Palliative care is following.  Had long discussion with family about goals of care.  They would like to continue all measures.  The are willing to care for him at home.  They reiterated their wishes that he would not like to be sent to a rehab facility.  Goals of care, counseling/discussion  Goals of care discussions multiple admissions including current 1 palliative care follow-up continues with disease driven treatments  Discussed with mother, sister patient unfortunately continues with poor prognosis  Patient has 24/7 support at home  Case management following assistance upon discharge   Rash of back  Patient has a persistent rash on his left back.  Does not cross the midline but expands across several  dermatomes.  Rash is papular/pustule and crusted in places.  Rash was first noted over a week ago on this admission but patient's sister endorses rash being present over the course of a year.  They have tried over-the-counter creams for relief with no significant improvement.    10/30: Biopsy results were consistent with cutaneous superficial fungal infection particularly dermatophytosis (tinea).      Plan  Dermatology on board recommend:  200 mg fluconazole weekly for 4 weeks  Start topical miconazole cream twice daily on back  Disposition: Critical care    ICU Core Measures     Vented Patient  VAP Bundle  VAP bundle ordered     A: Assess, Prevent, and Manage Pain Has pain been assessed? Yes  Need for changes to pain regimen? No   B: Both Spontaneous Awakening Trials (SATs) and Spontaneous Breathing Trials (SBTs) Plan to perform spontaneous awakening trial today? N/A   Plan to perform spontaneous breathing trial today? N/A   Obvious barriers to extubation? Yes   C: Choice of Sedation RASS Goal: 0 Alert and Calm  Need for changes to sedation or analgesia regimen? No   D: Delirium CAM-ICU: Negative   E: Early Mobility  Plan for early mobility? Yes   F: Family Engagement Plan for family engagement today? Yes       Antibiotic Review: Patient on appropriate coverage based on culture data.  More consistent with biopsy data.    Review of Invasive Devices:      Central access plan: Medications requiring central line      Prophylaxis:  VTE VTE covered by:  apixaban, Per PEG Tube, 5 mg at 10/29/24 3691       Stress Ulcer  not ordered         24 Hour Events : Continued to have vasovagal events with minimal interventions needed responded well to verbal stimulation.  Subjective patient is alert and oriented today.  Follows all commands.  Denies feeling new acute symptomology.       Objective :                   Vitals I/O      Most Recent Min/Max in 24hrs   Temp 98.7 °F (37.1 °C) Temp  Min: 98.7 °F (37.1 °C)  Max: 100.3 °F  (37.9 °C)   Pulse 61 Pulse  Min: 58  Max: 90   Resp 14 Resp  Min: 14  Max: 14   /80 BP  Min: 102/63  Max: 155/89   O2 Sat 94 % SpO2  Min: 61 %  Max: 99 %      Intake/Output Summary (Last 24 hours) at 10/30/2024 0748  Last data filed at 10/30/2024 0600  Gross per 24 hour   Intake 3661 ml   Output 800 ml   Net 2861 ml       Diet Enteral/Parenteral; Tube Feeding No Oral Diet; Jevity 1.5; Continuous; 70; 150; Water; Every 4 hours    Invasive Monitoring           Physical Exam   Physical Exam  Eyes:      General: No foreign body in eyeNo scleral icterus.        Right eye: No discharge.         Left eye: No discharge.      Extraocular Movements: Extraocular movements intact.      Conjunctiva/sclera: Conjunctivae normal.   Skin:     Findings: Wound present.   HENT:      Head: Normocephalic.      Mouth/Throat:      Mouth: No injury or angioedema.   Neck:      Trachea: Tracheostomy present.   Cardiovascular:      Rate and Rhythm: Normal rate and regular rhythm.   Musculoskeletal:         General: Swelling and signs of injury present.      Right lower leg: Trace Edema present.      Left lower leg: Trace Edema present.   Abdominal: General: There is no distension.      Tenderness: There is no abdominal tenderness. There is no guarding.      Hernia: A hernia is present.   Constitutional:       Appearance: He is well-developed.      Interventions: He is intubated and restrained.   Pulmonary:      Effort: Pulmonary effort is normal. No respiratory distress. He is intubated.      Breath sounds: Rhonchi present.   Neurological:      Mental Status: He is alert. He is calm.      Cranial Nerves: No facial asymmetry.   Genitourinary/Anorectal:  external catheter present.        Diagnostic Studies        Lab Results: I have reviewed the following results:     Medications:  Scheduled PRN   acetylcysteine, 3 mL, Q6H  apixaban, 5 mg, BID  chlorhexidine, 15 mL, Q12H SIAEL  clonazePAM, 0.5 mg, HS  fluconazole, 200 mg, Weekly  furosemide,  40 mg, Daily  gabapentin, 200 mg, Q8H   Followed by  gabapentin, 100 mg, Q8H   Followed by  [START ON 11/1/2024] gabapentin, 100 mg, Q12H   Followed by  [START ON 11/3/2024] gabapentin, 100 mg, HS  levalbuterol, 1.25 mg, Q6H  melatonin, 3 mg, HS  miconazole, , BID  potassium phosphate, 21 mmol, Once      acetaminophen, 650 mg, Q6H PRN  bisacodyl, 10 mg, Daily PRN  fentaNYL, 50 mcg, Q2H PRN  oxyCODONE, 5 mg, Q6H PRN       Continuous          Labs:   CBC    Recent Labs     10/29/24  0448 10/30/24  0608   WBC 8.71 20.26*   HGB 10.3* 11.0*   HCT 33.6* 34.3*    158     BMP    Recent Labs     10/29/24  0448 10/30/24  0608   SODIUM 134* 136   K 4.0 3.9   CL 96 96   CO2 38* 40*   AGAP 0* 0*   BUN 25 27*   CREATININE 0.39* 0.39*   CALCIUM 8.2* 8.3*       Coags    No recent results     Additional Electrolytes  Recent Labs     10/29/24  0448 10/30/24  0608   MG 1.9 2.0   PHOS 2.2* 2.0*   CAIONIZED 1.15 1.12          Blood Gas    No recent results  No recent results LFTs  No recent results    Infectious  No recent results  Glucose  Recent Labs     10/29/24  0448 10/30/24  0608   GLUC 137 135

## 2024-10-30 NOTE — PROCEDURES
Attempted to exchange 8.0 distal XLT at bedside due to insufficiency of the balloon with frequent needs to add air.  Indwelling 8.0 XLT was removed with difficulty due to catching the deflated balloon at or around the skin surface.  This was able to be removed; however, an 8.0 Shiley would not re-enter the tract despite pressure.  We were able to place a 6.0 without difficulty.  Some bleeding was noted in the stoma; the patient is on eliquis.  Some subcutaneous collection formed lateral and to the patient's right side of his stoma without associated bruising.      Patient re-connected to the ventilator through the 6.0.  ENT consulted to assist with replacing 8.0 distal XLT given patient's significant secretion burden.  Discharge postponed.    No difficulty ventilating the patient through the 6.0 tracheostomy.  Vitals remain stable.

## 2024-10-30 NOTE — RESPIRATORY THERAPY NOTE
Discussed with pts sister if any education for vent, trach care or bagging was needed. Sister stated that she was comfortable with the ventilator, circuits, suctioning, trach care and bagging. Sister stated that she had previously been educated on these topics and did not need further education.

## 2024-10-30 NOTE — OCCUPATIONAL THERAPY NOTE
"  Occupational Therapy Screen Note     Patient Name: Hemanth Swanson  Today's Date: 10/30/2024  Problem List  Principal Problem:    Toxic metabolic encephalopathy  Active Problems:    Umbilical hernia without obstruction and without gangrene    Chronic respiratory failure with hypoxia and hypercapnia (HCC)    Sepsis (HCC)    Bradycardia    Seizure-like activity (HCC)    Palliative care by specialist    Goals of care, counseling/discussion    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HCC)    Rash of back       10/30/24 0978   OT Last Visit   OT Visit Date 10/30/24   Note Type   Note type Screen   Additional Comments OT consult received and chart reviewed. Per case management, pt is dependent for all care and has access to all DME/AD, including a nicky lift to optimize patients OOB mobility and participation in meaningful tasks. Pt resides with his sister, Dmitry, and his Aunt - Magaly who are his primary caretakers. \"As per Dmitry they are trained on using pt's vent, trach care, gtube care, and assist pt with all ADLS\"  Pt is on tube feeds at home as needed. Otherwise he does eat food with family assistance. Prior to admission pt was able to stand and transfer w/ 2-3 people. Upon chart review, pt is planned for d/c to home today. Pt appears to be at or close to his fnxl baseline. No skilled OT needs warranted at this time. Recommended continued family support and assistance. Will d/c from IPOT caseload. Pt is setup with HHOT upon d/c, per case management. Please re-consult with any acute change in fnxl status.       Merced Forte MS OTR/L   [place] Licensure# [id number]       "

## 2024-10-30 NOTE — ASSESSMENT & PLAN NOTE
Intermittent periods of agitation, restless however alert oriented x 4 without loss of consciousness over the past few days  MRI on 10/16 with hypoxic brain injury findings  MRI C-Spine on 10/23: Findings favor nonspecific transverse myelitis; no sign of acuity due to lack of cord expansion. Unable to rule out ramifications from prior ischemia.  Goals of care discussions result: continue with disease treatment palliative care has been follow now signed off as goals of care are clear.  Neurology following recommendations to repeat paraneoplastic workup and an MRI in about a month  [id number]/21: Culture/stain 2+ poly, 2+ gram-positive rods, 1+ gram-negative rods growing.    Copper 96 ug/dL                                   CSF Gram stain +1 CSF fluid to is clear glucose of 54, protein 32  CSF culture: Growth in broth culture only Candida albicans  Plan  Continue monitor mental status  Per family patient is at baseline events has been persistent over the past year  Regards to restless agitation bearing-down has responded to gabapentin now at 300 TID and Ativan as needed; episodes have reduced intensity   Continue monitoring metabolic derangement  Completed 10-day course of antibiotics for Pseudomonas and Proteus mirabilis pneumonia  Monitor off of antibiotics likely colonization   Transverse myelitis w/u ordered: Serum NMO IgG autoantibodies, copper level, Sjogren's antibodies, vitamin B12, folate, vitamin B1, RPR, Lyme, vitamin D 25; largely WNL exception Vit. D0 MS panel and MOG still pending.   LP completed f/u pending results; STAT Gram stain showing 1+ mononuclear cells, ME panel negative

## 2024-10-30 NOTE — ASSESSMENT & PLAN NOTE
Intermittent bradycardia not related to hypoxia.   He has been maintaining heart rates above 60 since medication adjustments.  Seroquel discontinue  Heart rate between episodes fluctuates from low 80s high 90s.  Continues to experience desaturations/bradycardic episodes likely vasovagal in nature  Removed scheduled benzodiazepines and opioids.  Episodes continue but have decreased in intensity     Plan  -Currently treating with manual bagging/suction, verbal encouragement  -Tapering gabapentin; not effective

## 2024-10-30 NOTE — RESPIRATORY THERAPY NOTE
RT Ventilator Management Note  Hemanth Swanson 39 y.o. male MRN: [id number]  Unit/Bed#: ICU 05 Encounter: [id number]    Added air to cuff to maintain adequate cuff presure   10/30/24 0729   Respiratory Assessment   Assessment Type Pre-treatment   General Appearance Sleeping   Respiratory Pattern Assisted   Chest Assessment Chest expansion symmetrical   Bilateral Breath Sounds Coarse   Suction Trach   Resp Comments Added air to cuff   O2 Device vent   Vent Information   Vent    Vent type     Vent Mode AC/VC   $ Vital Capacity Mech/Peak Flow Yes   $ Pulse Oximetry Spot Check Charge Completed   SpO2 93 %   AC/VC Settings   Resp Rate (BPM) 14 BPM   Vt (mL) 500 mL   FIO2 (%) 40 %   PEEP (cmH2O) 8 cmH2O   Flow Pattern (LPM) 55 L/min   Trigger Sensitivity Flow (lpm) 3 %   Humidification Heater   Heater Temperature (Set) 98.6 °F (37 °C)   AC/VC Actuals   Resp Rate (BPM) 16 BPM   VT (mL) 510   MV 8.54   MAP (cmH2O) 14 cmH2O   Peak Pressure (cmH2O) 31 cmH2O   I/E Ratio (Obs) 1:3.3   Heater Temperature (Obs) 98.6 °F (37 °C)   Static Compliance (mL/cmH20) 85 mL/cmH2O   Plateau Pressure (cm H2O) 26 cm H2O   AC/VC Alarms   High Peak Pressure (cmH2O) 45   High Resp Rate (BPM) 50 BPM   High MV (L/min) 16 L/min   Low MV (L/min) 3 L/min   Vt High (mL) 900 mL   Vt Low (mL) 250 mL   AC/VC Apnea Settings   Resp Rate (BPM) 14 BPM   VT (mL) 500 mL   FIO2 (%) 100 %   Apnea Time (s) 20 S   Apnea Flow (L/min) 55 L/min   Maintenance   Alarm (pink) cable attached Yes   Resuscitation bag with peep valve at bedside Yes   Water bag changed No   Circuit changed No   Daily Screen   Patient safety screen outcome: Failed   Not Ready for Weaning due to: Underline problem not resolved   Surgical Airway Distal;Cuffed;Other (Comment)   Placement Date/Time: 06/01/24 1155   Tube Size: (c) 8  Placed By: Physician  Placed by (Name): Dr. Ronquillo  Type: Tracheostomy  Style: (c) Distal;Cuffed;Other (Comment)   Status Cuff Inflated;Secured    Ties Assessment Secure;Dry;Clean   Surgical Airway Cuff Pressure (cm H20) 28 cm H2O   Surgical Airway Cuff Pressure (color) Green   Equipment at bedside BVM;Additional complete same size trach tube;Wall Suction setup;Additional complete one size smaller trach tube;Obturator;Sterile saline;Additional inner cannula       Daily Screen         10/29/2024  0731 10/30/2024  0729          Patient safety screen outcome:: Failed Failed      Not Ready for Weaning due to:: Underline problem not resolved Underline problem not resolved                Physical Exam:   Assessment Type: Pre-treatment  General Appearance: Sleeping  Respiratory Pattern: Assisted  Chest Assessment: Chest expansion symmetrical  Bilateral Breath Sounds: Coarse  Suction: Trach  O2 Device: vent      Resp Comments: Added air to cuff

## 2024-10-30 NOTE — PLAN OF CARE
Problem: Prexisting or High Potential for Compromised Skin Integrity  Goal: Skin integrity is maintained or improved  Description: INTERVENTIONS:  - Identify patients at risk for skin breakdown  - Assess and monitor skin integrity  - Assess and monitor nutrition and hydration status  - Monitor labs   - Assess for incontinence   - Turn and reposition patient  - Assist with mobility/ambulation  - Relieve pressure over bony prominences  - Avoid friction and shearing  - Provide appropriate hygiene as needed including keeping skin clean and dry  - Evaluate need for skin moisturizer/barrier cream  - Collaborate with interdisciplinary team   - Patient/family teaching  - Consider wound care consult   Outcome: Progressing     Problem: PAIN - ADULT  Goal: Verbalizes/displays adequate comfort level or baseline comfort level  Description: Interventions:  - Encourage patient to monitor pain and request assistance  - Assess pain using appropriate pain scale  - Administer analgesics based on type and severity of pain and evaluate response  - Implement non-pharmacological measures as appropriate and evaluate response  - Consider cultural and social influences on pain and pain management  - Notify physician/advanced practitioner if interventions unsuccessful or patient reports new pain  Outcome: Progressing     Problem: INFECTION - ADULT  Goal: Absence or prevention of progression during hospitalization  Description: INTERVENTIONS:  - Assess and monitor for signs and symptoms of infection  - Monitor lab/diagnostic results  - Monitor all insertion sites, i.e. indwelling lines, tubes, and drains  - Monitor endotracheal if appropriate and nasal secretions for changes in amount and color  - [place] appropriate cooling/warming therapies per order  - Administer medications as ordered  - Instruct and encourage patient and family to use good hand hygiene technique  - Identify and instruct in appropriate isolation precautions for  identified infection/condition  Outcome: Progressing  Goal: Absence of fever/infection during neutropenic period  Description: INTERVENTIONS:  - Monitor WBC    Outcome: Progressing     Problem: SAFETY ADULT  Goal: Patient will remain free of falls  Description: INTERVENTIONS:  - Educate patient/family on patient safety including physical limitations  - Instruct patient to call for assistance with activity   - Consult OT/PT to assist with strengthening/mobility   - Keep Call bell within reach  - Keep bed low and locked with side rails adjusted as appropriate  - Keep care items and personal belongings within reach  - Initiate and maintain comfort rounds  - Make Fall Risk Sign visible to staff  - Apply yellow socks and bracelet for high fall risk patients  - Consider moving patient to room near nurses station  Outcome: Progressing  Goal: Maintain or return to baseline ADL function  Description: INTERVENTIONS:  -  Assess patient's ability to carry out ADLs; assess patient's baseline for ADL function and identify physical deficits which impact ability to perform ADLs (bathing, care of mouth/teeth, toileting, grooming, dressing, etc.)  - Assess/evaluate cause of self-care deficits   - Assess range of motion  - Assess patient's mobility; develop plan if impaired  - Assess patient's need for assistive devices and provide as appropriate  - Encourage maximum independence but intervene and supervise when necessary  - Involve family in performance of ADLs  - Assess for home care needs following discharge   - Consider OT consult to assist with ADL evaluation and planning for discharge  - Provide patient education as appropriate  Outcome: Progressing  Goal: Maintains/Returns to pre admission functional level  Description: INTERVENTIONS:  - Perform AM-PAC 6 Click Basic Mobility/ Daily Activity assessment daily.  - Set and communicate daily mobility goal to care team and patient/family/caregiver.   - Collaborate with rehabilitation  services on mobility goals if consulted  - Out of bed for toileting  - Record patient progress and toleration of activity level   Outcome: Progressing     Problem: DISCHARGE PLANNING  Goal: Discharge to home or other facility with appropriate resources  Description: INTERVENTIONS:  - Identify barriers to discharge w/patient and caregiver  - Arrange for needed discharge resources and transportation as appropriate  - Identify discharge learning needs (meds, wound care, etc.)  - Arrange for interpretive services to assist at discharge as needed  - Refer to Case Management Department for coordinating discharge planning if the patient needs post-hospital services based on physician/advanced practitioner order or complex needs related to functional status, cognitive ability, or social support system  Outcome: Progressing     Problem: Knowledge Deficit  Goal: Patient/family/caregiver demonstrates understanding of disease process, treatment plan, medications, and discharge instructions  Description: Complete learning assessment and assess knowledge base.  Interventions:  - Provide teaching at level of understanding  - Provide teaching via preferred learning methods  Outcome: Progressing     Problem: Nutrition/Hydration-ADULT  Goal: Nutrient/Hydration intake appropriate for improving, restoring or maintaining nutritional needs  Description: Monitor and assess patient's nutrition/hydration status for malnutrition. Collaborate with interdisciplinary team and initiate plan and interventions as ordered.  Monitor patient's weight and dietary intake as ordered or per policy. Utilize nutrition screening tool and intervene as necessary. Determine patient's food preferences and provide high-protein, high-caloric foods as appropriate.     INTERVENTIONS:  - Monitor oral intake, urinary output, labs, and treatment plans  - Assess nutrition and hydration status and recommend course of action  - Evaluate amount of meals eaten  - Assist  patient with eating if necessary   - Allow adequate time for meals  - Recommend/ encourage appropriate diets, oral nutritional supplements, and vitamin/mineral supplements  - Order, calculate, and assess calorie counts as needed  - Recommend, monitor, and adjust tube feedings and TPN/PPN based on assessed needs  - Assess need for intravenous fluids  - Provide specific nutrition/hydration education as appropriate  - Include patient/family/caregiver in decisions related to nutrition  Outcome: Progressing

## 2024-10-30 NOTE — ASSESSMENT & PLAN NOTE
Sepsis likely secondary to multifocal pneumonia in the setting of Proteus and Pseudomonas day 10 on antibiotics today for completion.   Now shock undifferentiated consider hypovolemic in the setting of insensible losses, echocardiogram has been remained unremarkable doubt sepsis at this time given adequate treatment on antibiotics for 10 days  Blood pressure remained with maps above 70 overnight.  He did not require pressors.  Will consider POCUS disease continuing evaluate IVC urine output has slowed down    WBC up trended today will obtain differential.     Plan  If pressures are low, will trial of 500 cc Isolyte with monitoring urinary output.

## 2024-10-30 NOTE — DISCHARGE INSTR - AVS FIRST PAGE
Dear Hemanth Swanson,     It was our pleasure to care for you here at [facility].  It is our hope that we were always able to exceed the expected standards for your care during your stay.  You were hospitalized due to PEG tube displacement.  You were cared for on the second floor by Trenton Pearce MD under the service of Silvio Alas,* with the [facility] who covers for your primary care physician (PCP), Ela Douglas MD, while you were hospitalized.  If you have any questions or concerns related to this hospitalization, you may contact us at .  For follow up as well as any medication refills, we recommend that you follow up with your primary care physician.  A registered nurse will reach out to you by phone within a few days after your discharge to answer any additional questions that you may have after going home.  However, at this time we provide for you here, the most important instructions / recommendations at discharge:     Notable Medication Adjustments -   Start fluconazole 200 mg once a week for 3 more weeks  Start miconazole 2% cream on back for rash twice a day until rash resolves; can also inquire with PCP for further instructions  Continue Mucomyst and Xopenex  Gabapentin take to 2 mL by mouth every 8 hours for 1 days then 2 mL every 12 hours for 2 days then 2 mL daily at bedtime for 2 days then stop.  Testing Required after Discharge -   Please call [facility] to schedule a follow-up MRI  ** Please contact your PCP to request testing orders for any of the testing recommended here **  Important follow up information -   Please follow-up with neurology for results on workup and final testing for autoimmune disease  Please follow-up with your PCP  Other Instructions -   If symptoms return or worsen please reach out to your PCP if unable return to the emergency room  Please review this entire after visit summary as  additional general instructions including medication list, appointments, activity, diet, any pertinent wound care, and other additional recommendations from your care team that may be provided for you.      Sincerely,     Trenton Pearce MD\

## 2024-10-30 NOTE — CASE MANAGEMENT
Case Management Discharge Planning Note    Patient name Hemanth Swanson  Location ICU 05/ICU 05 MRN [id number]  : 1985 Date 10/30/2024       Current Admission Date: 10/5/2024  Current Admission Diagnosis:Toxic metabolic encephalopathy   Patient Active Problem List    Diagnosis Date Noted Date Diagnosed    Rash of back 10/25/2024     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HCC) 10/17/2024     Palliative care by specialist 10/11/2024     Goals of care, counseling/discussion 10/11/2024     Toxic metabolic encephalopathy 10/07/2024     Cardiac arrest due to other underlying condition (HCC) 2024     Seizure-like activity (HCC) 2024     Bradycardia 2024     Ventilator dependent (HCC) 2024     Obesity hypoventilation syndrome (HCC) 10/24/2023     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     Psychophysiological insomnia 10/18/2023     Impaired mobility 2023     Status post tracheostomy ([facility]) 2023     S/P percutaneous endoscopic gastrostomy (PEG) tube placement ([facility]) 2023     Anxiety 2023     Chronic respiratory failure with hypoxia and hypercapnia ([facility]) 2023     Sepsis (HCC) 2023     Acute pulmonary embolism without acute cor pulmonale (HCC) 2023     Depression 2023     History of LVH (left ventricular hypertrophy) 2023     Pure hypertriglyceridemia 2023     Unilateral vestibular schwannoma (HCC) 2023     Port-A-Cath in place 2023     Diplopia 2023     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HCC) 2023     Umbilical hernia without obstruction and without gangrene 12/10/2022     Tobacco use 12/10/2022     Retroperitoneal lymphadenopathy 12/10/2022       LOS (days): 25  Geometric Mean LOS (GMLOS) (days):   Days to GMLOS:     OBJECTIVE:  Risk of Unplanned Readmission Score: 47.52         Current admission status: Inpatient   Preferred Pharmacy:   [facility]/pharmacy #[id number] - [place] [address]  Critical access hospital [address]  Phone: [phone] Fax: [phone]    Primary Care Provider: Ela Douglas MD    Primary Insurance: Replaced by [facility]  Secondary Insurance:     DISCHARGE DETAILS:    Discharge planning discussed with:: Pt sisterDmitry  Freedom of Choice: Yes  Comments - Freedom of Choice: REturn home with [facility] (PT/OT)    Contacts  Patient Contacts: Dmitry Swanson (sister)  Relationship to Patient:: Family  Contact Method: Phone  Phone Number: [phone]  Reason/Outcome: Discharge Planning, Continuity of Care, Emergency Contact, Referral    Other Referral/Resources/Interventions Provided:  Interventions: HHC, DME  Referral Comments:     CM notified by CC team pt is ready for dc today. Plan is for Dmitry to meet with the team and RT at pt's bedside today prior to dc.    CM notified by [employer] that they had a new pump for tube feeds delivered to pt home yesterday and tube feeds are set to arrive before 3:15 pm. RT with [employer] was out and adjusted pt's home VENT with the new settings. CCT scheduled for transport home - recieved message from [employer]. They report in the past pt can transport via BLS and the family manages pt's home VENT for transport. Requesting if this is an option. CM spoke with Dmitry who confirmed that her daughter is able to transport with pt and is trained on his VENT and able to bag/suction pt if needed. [facility] Updated and will have pt scheduled as a BLS. Dmitry aware of transport time for 4 pm.    Dmitry also confirmed that she will be in today to meet with the CC team and respiratory prior to pt's transport to home. RISHABH also spoke with pt's sister, Dmitry. She confirmed pump was delivered and the settings were updated on the pump for pt's tube feeds at home. Dmitry aware VENT settings were changed this morning.    Treatment Team Recommendation: Home  Discharge Destination Plan:: Home  Transport at Discharge : BLS Ambulance     Number/Name of Dispatcher:  Round Trip [phone]  Transported by (Company and Unit #): [id number]  ETA of Transport (Date): 10/30/24  ETA of Transport (Time): 1600    CC team, RN, and pt's sister aware of transport time for 4 pm today.

## 2024-10-30 NOTE — CONSULTS
OTOLARYNGOLOGY CONSULT    Date of Service: 10/30/2024    Reason for consult: tracheostomy tube upsize     ASSESSMENT/PLAN:  Hemanth Swanson is a 39 y.o. male with chronic ventilator dependence who we are consulted due to difficulty replacing his 8-0 Shiley distal XLT cuffed by primary team, now with 6-0 distal XLT cuffed in place, saturating appropriately on the ventilator.     - monitor overnight with 6-0 distal XLT cuffed in place  - plan to re-evaluate/potentially upsize to 8-0 distal XLT cuffed 10/31 afternoon    Please reach out to ENT resident on call for any further questions or concerns.    HPI  38 yo M with PMH obesity, HTN, anoxic brain injury, seminoma and chronic ventilator dependence due to an unknown neuromuscular disorder after treatment with cisplatin. [facility] tracheotomy tube placement one year ago, where a 8-0 shiley cuffed was placed in Sept 2023 by general surgery.     Earlier this year, he had a 6-0 distal XLT Shiley cuffed in place which was upsized to an 8-0 distal XLT Shiley cuffed by ENT team in June for pulmonary toilet. This AM ICU team attempted trach change due to cuff leak, but had difficulty placing an 8-0 tube and so a 6-0 distal XLT Shiley cuffed was placed.    CURRENT HOSPITAL MEDICATIONS  Current Facility-Administered Medications   Medication Dose Route Frequency Provider Last Rate Last Admin    acetaminophen (TYLENOL) oral suspension 650 mg  650 mg Per PEG Tube Q6H PRN Ne Low MD   650 mg at 10/29/24 2254    acetylcysteine (MUCOMYST) 200 mg/mL inhalation solution 600 mg  3 mL Nebulization Q6H Yessy Barksdale MD   600 mg at 10/30/24 1400    apixaban (ELIQUIS) tablet 5 mg  5 mg Per PEG Tube BID Jus Melendez MD   5 mg at 10/30/24 1706    bisacodyl (DULCOLAX) rectal suppository 10 mg  10 mg Rectal Daily PRN Ne Low MD        chlorhexidine (PERIDEX) 0.12 % oral rinse 15 mL  15 mL Mouth/Throat Q12H [facility] Mohsen Moon MD   15 mL at 10/30/24 0808     clonazePAM (KlonoPIN) tablet 0.5 mg  0.5 mg Per G Tube HS Jsu Melendez MD   0.5 mg at 10/29/24 2254    fentaNYL injection 50 mcg  50 mcg Intravenous Q2H PRN AMBER Cline   50 mcg at 10/30/24 1438    fluconazole (DIFLUCAN) oral suspension 200 mg  200 mg Oral Weekly Antonella Carrasco MD   200 mg at 10/29/24 1640    furosemide (LASIX) oral solution 40 mg  40 mg Per PEG Tube Daily Jus Melendez MD   40 mg at 10/30/24 0809    gabapentin (NEURONTIN) oral solution 100 mg  100 mg Per PEG Tube Q8H Jus Melendez MD   100 mg at 10/30/24 1700    Followed by    [START ON 11/1/2024] gabapentin (NEURONTIN) oral solution 100 mg  100 mg Per PEG Tube Q12H Jus Melendez MD        Followed by    [START ON 11/3/2024] gabapentin (NEURONTIN) oral solution 100 mg  100 mg Per PEG Tube HS Jus Melendez MD        levalbuterol (XOPENEX) inhalation solution 1.25 mg  1.25 mg Nebulization Q6H Silvio Alfie Alas MD   1.25 mg at 10/30/24 1400    melatonin tablet 3 mg  3 mg Per PEG Tube HS AMBER Cline   3 mg at 10/29/24 2254    miconazole 2 % cream   Topical BID Antonella Carrasco MD   Given at 10/30/24 1706    oxyCODONE (ROXICODONE) IR tablet 5 mg  5 mg Per PEG Tube Q6H PRN Jus Mleendez MD           REVIEW OF SYSTEMS  As above    HISTORIES  PMH:  Past Medical History:   Diagnosis Date    Anxiety     Cancer (HCC)     Depression     Hypernatremia 10/06/2024    Migration of percutaneous endoscopic gastrostomy (PEG) tube (HCC) 09/24/2023    Psychiatric disorder     bipolar       PSH:  Past Surgical History:   Procedure Laterality Date    IR BIOPSY LYMPH NODE  01/20/2023    IR GASTROSTOMY (G) TUBE CHECK/CHANGE/REINSERTION/UPSIZE  6/18/2024    IR GASTROSTOMY TUBE PLACEMENT  09/25/2023    IR LUMBAR PUNCTURE  09/06/2023    IR LUMBAR PUNCTURE  10/25/2024    IR PORT PLACEMENT  03/14/2023    ORCHIECTOMY Left 12/14/2022    Procedure: ORCHIECTOMY INGUINAL;  Surgeon: Flip Michael MD;  Location: [facility];  Service:  "Urology    PEG W/TRACHEOSTOMY PLACEMENT N/A 2023    Procedure: TRACHEOSTOMY WITH INSERTION PEG TUBE;  Surgeon: Rudy Mcmanus MD;  Location: [facility];  Service: General    TESTICLE SURGERY         SocHx:  Social History     Tobacco Use    Smoking status: Former     Current packs/day: 0.00     Average packs/day: 0.7 packs/day for 22.7 years (15.0 ttl pk-yrs)     Types: Cigarettes     Start date: 2008     Quit date: 2023     Years since quittin.4    Smokeless tobacco: Never   Vaping Use    Vaping status: Former    Start date: 2018    Quit date: 2023    Substances: Nicotine, THC   Substance Use Topics    Alcohol use: Not Currently     Comment: Former alcoholic. Last use in     Drug use: Not Currently     Types: \"Crack\" cocaine, Marijuana     Comment: Current use medical marijuana. Not currently using crack cocaine.       FH:  Family History   Problem Relation Age of Onset    Coronary artery disease Maternal Aunt     Cancer Father     Diabetes Father     Hypertension Father        ALLERGIES:  Allergies   Allergen Reactions    Dog Epithelium Cough, Sneezing and Nasal Congestion       PHYSICAL EXAM  Visit Vitals  /85   Pulse 71   Temp 98.7 °F (37.1 °C) (Oral)   Resp 14   Ht 6' 4\" (1.93 m)   Wt 100 kg (220 lb 10.9 oz)   SpO2 92%   BMI 26.86 kg/m²   Smoking Status Former   BSA 2.31 m²       General: NAD  Eyes:  EOMI, PERRL  Ears:  External ears normal in appearance   Nose:  External appearance normal, no septal deviation  Oral cavity:  No trismus, no mass/lesions  Neck: Trachea is midline; no thyroid nodules, Salivary glands symmetrical, no masses/abnormality on palpation. 6-0 distal XLT Shiley cuffed, with cuff up in place  Lymph:  No cervical lymphadenopathy  Skin:  No obvious facial lesions  Lungs:  Normal work of breathing, symmetrical chest expansion  Vascular: Well perfused      LABORATORY  Reviewed    PROCEDURES  Flexible tracheoscopy performed:  The ventilator was disconnected " from tracheostomy tube with the help of RT. A flexible laryngoscope was inserted through the the tube. Faith was visualized. Copious tick secretions seen coming from the right bronchus. No active bleeding seen.   The patient tolerated the procedure well.     RADIOLOGY      Patient Active Problem List    Diagnosis Date Noted    Rash of back 10/25/2024    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HCC) 10/17/2024    Palliative care by specialist 10/11/2024    Goals of care, counseling/discussion 10/11/2024    Toxic metabolic encephalopathy 10/07/2024    Cardiac arrest due to other underlying condition ([facility]) 06/11/2024    Seizure-like activity ([facility]) 06/02/2024    Bradycardia 06/01/2024    Ventilator dependent ([facility]) 05/30/2024    Obesity hypoventilation syndrome ([facility]) 10/24/2023    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    Psychophysiological insomnia 10/18/2023    Impaired mobility 09/24/2023    Status post tracheostomy ([facility]) 09/14/2023    S/P percutaneous endoscopic gastrostomy (PEG) tube placement ([facility]) 09/12/2023    Anxiety 09/06/2023    Chronic respiratory failure with hypoxia and hypercapnia ([facility]) 08/24/2023    Sepsis ([facility]) 08/24/2023    Acute pulmonary embolism without acute cor pulmonale ([facility]) 08/23/2023    Depression 08/16/2023    History of LVH (left ventricular hypertrophy) 07/19/2023    Pure hypertriglyceridemia 07/19/2023    Unilateral vestibular schwannoma ([facility]) 04/20/2023    Port-A-Cath in place 03/08/2023    Diplopia 02/22/2023    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[facility]) 01/03/2023    Umbilical hernia without obstruction and without gangrene 12/10/2022    Tobacco use 12/10/2022    Retroperitoneal lymphadenopathy 12/10/2022         Latasha Antoine MD  Otolaryngology--Head and Neck Surgery  [facility]  10/30/2024 6:09 PM

## 2024-10-30 NOTE — ASSESSMENT & PLAN NOTE
Neuromuscular disease post chemotherapyS/P tracheostomy in 9/2023.  He continues to fail SBT likely in the setting of diaphragmatic weakness in the setting of progressive demyelinating/ neuromuscular disease  Goals of care discussions continues disease driven  X-ray showed worsening opacities 10/21  Multiple bronchoscopies resulted thick mucus secretions  Sputum/bronc culture showing growth of Pseudomonas (s/p 10 days treatment for Pseudomonas/Proteus)  Patient is drowsy  Patient tried and failed to produce sputum on his own hypertonic saline, chest PT and suctioning.  Family/caregivers were unable to perform this at home due to physical limitations.  Patient's cognitive level is also contributing to the ability to mobilize secretions, increasing the risk for further infections and hospitalization.    10/30: Patient's WBC up trended; will obtain a differential due to recent steroid usage    Plan:  Continue vent support  Will monitor lethargy while on BiPaP   Xopnex and hypertonic saline nebs 3 times daily  Re-started chest therapy (previously halted due to concern they were precipitating vasovagal episodes)  Consider bronchoscopy for symptomatic relief as needed

## 2024-10-30 NOTE — CASE MANAGEMENT
Case Management Progress Note    Patient name Hemanth Swanson  Location ICU 05/ICU 05 MRN [id number]  : 1985 Date 10/30/2024       LOS (days): 25  Geometric Mean LOS (GMLOS) (days):   Days to GMLOS:        OBJECTIVE:        Current admission status: Inpatient  Preferred Pharmacy:   [facility]/pharmacy #[id number] - [address] [address]  Phone: [phone] Fax: [phone]    Primary Care Provider: Ela Douglas MD    Primary Insurance: [facility]  Secondary Insurance:     PROGRESS NOTE:    CM notified by nursing and Dr. Alas that pt dc for today is cancelled. Pt will need his trach replaced prior to dc. Plan for dc tomorrow. Pt's sister, Dmitry, aware of same.    CM met with Dmitry at bedside. Aware CM will arrange for transport tomorrow at 4 pm. Will call yo confirm with her in the morning.    CM contacted [facility]. Aware transport cancelled for today and will be arranged for tomorrow. Trip edited in Round Trip to reflect request for tomorrow.

## 2024-10-30 NOTE — RESPIRATORY THERAPY NOTE
Assisted in trach exchange with Dr. Alas. 8 distal removed. Unable to get 8 shiley back in, 6 DXLT placed in pt.

## 2024-10-30 NOTE — PROGRESS NOTES
Physical Medicine and Rehabilitation Progress Note  Hemanth Swanson 39 y.o. male MRN: [id number]  Unit/Bed#: ICU 05 Encounter: [id number]      Chief Complaints:  Resp failure     Subjective/eval:     Patient had LP 10/25 with elevated opening pressure 43 but otherwise unremarkable.  Growth in broth culture Candida felt likely to be contamination.  Paraneoplastic CSF negative; Serum paraneoplastic to [facility] still pending; Neuro felt etiology of weakness and resp failure unclear and demyelinating d/s could not be ruled out nor could not rule out CIDP and he was administered 5 day course of IVIG.  He continues to need intermittent manual bagging and had last bronch 10/27.  Started on clonazepam 0.5mg HS a few days ago and gabapentin being weaned down.  Derm recommended oral fluconazole weekly for 4 weeks for skin fungal infection.  WBC up to 20k.  Possible plan for d/c today home with family per CCM.      On eval, patient eyes open spontaneously.  He is able to open and close eyes to command and look in all directions with fair tracking.  Patient is able to squeeze both hands to commands and slightly move legs and feet.  He is unable to adequately communicate yes-no thru head nods, hands, or other means.    ROS: Could not be adequately (or fully adequately be) obtained due to degree of impaired cognition/communication at time of encounter.    Assessment & Plan:     39-year-old male with a past medical history of metastatic left testicular seminoma, status post left orchiectomy, chemotherapy, hypertension, anxiety, bipolar disorder, who developed hearing loss possible left-sided vestibular schwannoma, neuromuscular weakness, diplopia, bowel bladder incontinence following chemotherapy which was stopped.  Patient had extended hospital stay in August to September 2023 for this which also included respiratory failure requiring trach and PEG pulmonary embolism for which patient continues chronic trach/PEG status.  Patient  "also had hospitalization 5/2024 for multilobar PNA, hypoxic bradycardia felt 2/2 vasal vagal stimulation increased secretions and position of trach with mucous plugging c/b cardiac arrest with pulseless asystole without hypoxia, seizure like actiity felt to not be seizure activity helped apparently with managing secretions and keeping O2 above 90%.  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  There was no no electrodiagnostic evidence to support a diffuse neurogenic process such as motor neuron disease or a myopathic process affecting the peripheral nervous system.  Last OP neuro note 7/2024, \"given the lack of an obvious cause of his respiratory failure it is reasonable to consider neuromuscular disease. However, there is no evidence that this is the case. Specifically, there is nothing to suggest primary muscle disease, primary nerve disease, or neuromuscular junction disorders.\"      Of note,  patient was eating and drinking orally and they would not provide PEG tube feeds if eating or drinking adequately.  Patient would not drink plain water and drank mostly flavored drinks and sometimes soda per family.      Per family he had slow decline in function but overall was stable last few months at home with them managing him with trach/peg status.  He did have some hot flashes and sweating at home and was seen by urology late Aug and was started on IM testosterone biweekly for over a month.  He was noted to have increased agitation/restlessness for a week or so (per family he did not have these prior) and then significant more agitation and anger and was recommended to hold testosterone with last " dose 9/30.         On 10/5, patient apparently pulled out PEG during bout of agitation and presented to hospital where G tube was replaced but he was noted to be hypoxic with copious secretions and febrile.  He required adjustments to ventilation.  He was found to have complete L sided consolidation along with severe hypernatremia 176.  Patient was treated for PNA and sepsis with Abx.  Patient bronch culture + Proteus and pseudomonas and remains on Zosyn.  Leukocytosis improved 10/14.  Patient received IVF and nephro consulted for hypernatremia which was felt to be 2/2 dehydration which has been improving.  He had episode of rhythmic body jerking for several minutes on 10/10 and additional shaking episodes with hypoxia and bradycardia with hypotension requiring pressors.  He was placed on vEEG which captures some of these shaking spells which did not correlate with EEG seizures.  Neuro felt spells may be due to vagal response and possibly convulsive syncope in setting of hypoxia/bradycardia with possible functional component.  They did not feel he needed AED.       Significant episode of L lung mucous plugging requiring bronch suctioning 10/15; TTE 10/15 normal EF, diastolic function, R ventricle moderately dilated and new compared with 6/2024 study.   Florinef d/c'd; Remains on midodrine 15mg TID; remains on Oxy 5mg Q6H ATC; seroquel 25mg HS.  Required multiple doses of benzo on 10/16 for episodes of agitation/distress and need for sedation with planned MRI brain.     10/16, Discussion with CCM attending and concern for ongoing refractory respiratory failure and likely neuromuscular weakness significantly impacting resp muscles/diaphragm; given this degree of weakness, lack of available effective treatments, he is at risk for ongoing difficulty clearing secretions, mucous plugging, PNA, and respiratory collapse even with optimal ventilation.       10/16 MRI brain showed Mildly restricted diffusion in the splenium  "of the corpus callosum and more subtly in the anterior cingulate gyri. Possible sequela of hypoxic ischemic injury, inflammatory or infectious process. Per neuro \" with abnormal lesion in the splenium of corpus callosum (without post contrast enhancement), thus neurology asked to weigh in on MRI findings (CLOCC; cytotoxic lesion of corpus collosum, can carry broad spectrum of possible etiologies).\"Unclear etiology at this time. Would recommend repeat MRI in 1 month to re-evaluate findings.\"    10/21 CXR Persistent left basilar opacity may represent atelectasis and or pneumonia. Prominent pulmonary vasculature may be accentuated by low lung volumes versus mild volume overload. Required bronch     10/22 desat'ing multiple times requiring bagging; apparently helped after IV ativan; Repeat bronch 10/22 with significant thick secretion occluding both distal mainstems. Significant debris irrigated b/l.     10/21 MRI C spine showing Long segment of abnormal signal in the cervical and upper thoracic spine favored to represent nonspecific transverse myelitis.  No cord expansion, atrophy or abnormal enhancement. Recommend clinical correlation and further evaluation with CSF. Recommend 3-month follow-up contrast-enhanced M    LP 10/25 with elevated opening pressure 43 but otherwise unremarkable.  Growth in broth culture Candida felt likely to be contamination.  Neuro felt etiology of weakness unclear but could not rule out CIDP and he was administered 5 day course of IVIG.  He continues to need intermittent manual bagging and had last bronch 10/27.  Started on clonazepam 0.5mg HS a few days ago and gabapentin being weaned down.        Prior Level of Function and Social history:    Patient lives at home with family - sister Dmitry and Aunt Magaly who provide caregiving.  He was able to move arms and legs but was weak throughout.  He was able to type on computer and phone and follow simple commands and voice words.  Patient was " eating and drinking orally and they would not provide PEG tube feeds if eating or drinking adequately.  Patient would not drink plain water and drank mostly flavored drinks and sometimes soda.  He could help some with dressing.  He is chronically incontinent of b/b but would notifiy CG's/family when he went.  Overall though was dependent with ADLs and mobility; WVC, Wallace lift, Electric recliner; In past he could get to commode but not last several months.       Current Level of Function:    Dependent     Acute on Chronic respiratory failure with hypoxia  Neuromuscular weakness, abnormal MRI C spine   Leukocytosis   Sepsis history   AMS/encephalopathy  Hypotension  Bradycardia  Elevated Testosterone; hx of hypogonadism   Metastatic left testicular seminoma, status post left orchiectomy, chemotherapy  Trach dependent  PEG status  Hx of PE  Seizure like episodes  Bowel and bladder dysfunction   Agitation, restlessness, insomnia, hx of anxiety and bipolar  Encounter for skin care      Acute on Chronic respiratory failure with hypoxia on chronic vent, Sepsis, PNA  - 10/23 MRI C spine with and without contrast for ongoing respiratory failure, diaphragmatic weakness, and 4 limb weakness (reflexes chronically down but that could be from concurrent peripheral neuropathy)   - showing Long segment of abnormal signal in the cervical and upper thoracic spine favored to represent nonspecific transverse myelitis.  No cord expansion, atrophy or abnormal enhancement. Recommend clinical correlation and further evaluation with CSF. Recommend 3-month follow-up contrast-enhanced MRI.  - LP 10/25 with elevated opening pressure 43 but otherwise unremarkable.  Growth in broth culture Candida felt likely to be contamination.- Neuro felt etiology of weakness and resp failure unclear and demyelinating d/s could not be ruled out nor could not rule out CIDP and he was administered 5 day course of IVIG.  - Neuro recommending repeat  paraneoplastic work-up and MRI in about a month per [facility]   - Consider repeat EMG/NCS to include phrenic nerve - follow-up with neuro     - 10/30 still having thick secretions at times - [facility] plans to d/c home with family who apparently are comfortable with managing patient  - 10/29 - bagged temp with RT when he desat'ed on vent    - 10/27 - last bronch  - 10/22 desat'ing multiple times requiring bagging; apparently helped after IV ativan; Repeat bronch 10/22 with significant thick secretion occluding both distal mainstems. Significant debris irrigated b/l.   - 10/21 - bedside bronch during which patient desaturated; gram stain 2+GPR, 1+GNR  - 10/21 - pt afeb, increased WBC  10/21 10/21 CXR apparently worsening and plan for repeat bronch today?.  Did require brief bagging as well.  - 10/18 Zosyn/Abx course completed   - 10/17 trach exchanged for issues with balloon  - Started on valium and gabapentin for repeated episode of inability to tolerate vent   - 10/15 Significant episode of L lung mucous plugging requiring bronch suctioning 10/15;   - TTE 10/15 normal EF, diastolic function, R ventricle moderately dilated and new compared with 6/2024 study  - 10/5 P/w complete opacification of L hemithorax, hypoxic with copious secretions, fever following increasing bouts of agitation and pulling out peg in context of recent initiation of IM testosterone (unclear relation)  - he had last available swallow  studies in 10 and 11/2023 and were unremarkable and family has been providing oral feeds/oral fluids mostly at home   - 5/2024 hospitalization for multilobar PNA, hypoxic bradycardia felt 2/2 vasal vagal stimulation increased secretions and position of trach with mucous plugging c/b cardiac arrest with pulseless asystole without hypoxia, seizure like actiity felt to not be seizure activity helped apparently with managing secretions and keeping O2 above 90%  - Ensure appropriate mgmt of secretions and trach positioning   -  "Currently NPO status/TF only - Ensure SLP eval and MBS study prior to any future considerations for oral intake   - 10/14 Having intermittent bouts of increased PIP - sometime corresponding to diaphoresis and possibly \"fighting vent\"/anxiety, agitation per discussion with staff; had elevated PIP/low VT improved with suction of thick yellow mucous 10/14; did receive IV ativan with brief improvement earlier in day; oxy 5 x1 with little improvement   - Repeat CXR 10/14 pending; prior CXRs showing L basilar effusion and atelectasis    - CT C 10/11 - Moderate bibasilar pulmonary opacification consistent with atelectasis; superimposed pneumonia not entirely excludable. Trace left greater than right pleural effusions.   - Last CTPE study 5/30 negative for PE   - Overall sedation per [facility]              - Clonazepam 0.5mg HS   - Gabapentin being weaned down    - Ativan stopped  - Treated recently for PNA, possible atelectasis -  - Was on multiple agents for BP support -    - Now only midodrine  - Chronic resp failure etiology uncertain - neuro work-up inconclusive   - Overall mgmt and sedation per [facility]    - Monitor vent values, other vitals, labs, lung exam, overall exam, secretions closely  - Suction PRN when indicated  - Monitor for ventilator associated complications     Neuromuscular weakness, AMS/encephalopathy, abnormal MRI C spine  - Overall mgmt per neuro/CCM  - 10/23 MRI C spine with and without contrast for ongoing respiratory failure, diaphragmatic weakness, and 4 limb weakness (reflexes chronically down but that could be from concurrent peripheral neuropathy)   - showing Long segment of abnormal signal in the cervical and upper thoracic spine favored to represent nonspecific transverse myelitis.  No cord expansion, atrophy or abnormal enhancement. Recommend clinical correlation and further evaluation with CSF. Recommend 3-month follow-up contrast-enhanced MRI.  - LP 10/25 with elevated opening pressure 43 but " "otherwise unremarkable.  Growth in broth culture Candida felt likely to be contamination.  - Paraneoplastic CSF 10/25 negative  Neuro felt etiology of weakness and resp failure unclear and demyelinating d/s could not be ruled out nor could not rule out CIDP and he was administered 5 day course of IVIG.  - Serum paraneoplastic to [facility] still pending  - Neuro recommending repeat paraneoplastic work-up and MRI in about a month per CCM   - Consider repeat EMG/NCS to include phrenic nerve - follow-up with neuro   - Consider EMG/NCS to include phrenic nerve  - Strength recently  BUE EF/EE 2-, FF 2+ to 4; BLE 1- 2-   - OP Neuro exam 7/17 - Strength at least 4+/5 in all muscles of arms and legs proximally and distally (Absent DTR)  -  Patient did have restricted diffusion in splenium of CC on recent contrasted brain MRI  - pt does not have evidence of UMN signs of exam however last IP neuro note felt it was reasonable to obtain as well and if patient has/had concurrent peripheral neuropathy this may dampen expected heightened reflexes and he continues with significant 4 limb weakness with diaphragmatic weakness   - High risk of unexplained neuromuscular d/o driving resp failure at this point - without significant effective treatments   - Zinc wnl   - Selenium pending to eval for myoencephalopathy  - risk of encephalopathy or other cause as well as progression of underlying condition of uncertain etiology  - Consider repeat overall EMG/NCS to look for active demyelination/axonopathy and to include phrenic nerve testing (if phrenic nerve not involved could discuss case with [facility]/Brian to see if he would be candidate for diaphragmatic pacing) - 10/16 MRI brain showed Mildly restricted diffusion in the splenium of the corpus callosum and more subtly in the anterior cingulate gyri. Possible sequela of hypoxic ischemic injury, inflammatory or infectious process. Per neuro \" with abnormal lesion in the splenium of corpus " "callosum (without post contrast enhancement), thus neurology asked to weigh in on MRI findings (CLOCC; cytotoxic lesion of corpus collosum, can carry broad spectrum of possible etiologies).\"\"Unclear etiology at this time. Would recommend repeat MRI in 1 month to re-evaluate findings.\"  - Per neuro 10/19 \"neurohospitalist Dr. Geiger, d/w'ed Dr. Sams - reasonable to consider MRI c-spine w/wo contrast, given c/f bilateral diaphragm paralysis and evaluate for any structural abnormalities that could be impacting phrenic nerves. If obtained, please reach out to us if further input needed. \"  - Requires 24-7 caregivers and dependent with ADLs and mobility   - flaccid tone overall, generalized weakness, could track and move all extremities some to command but not consistently   - Monitor neuro exam and cosider  - While patient at current functional level would also focus on prevention or improvement of complications (malnutrition, dehydration, PNA, atelectasis, VTE, constipation/obstruction, urinary retention, UTI, ulcerations, contractures).  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  There was no no electrodiagnostic evidence to support a diffuse neurogenic process such as motor neuron disease or a myopathic process affecting the peripheral nervous system.  Last OP neuro note 7/2024, " "\"given the lack of an obvious cause of his respiratory failure it is reasonable to consider neuromuscular disease. However, there is no evidence that this is the case. Specifically, there is nothing to suggest primary muscle disease, primary nerve disease, or neuromuscular junction disorders.\"      Leukocytosis  - WBC up to 20K  - Mgmt per CCM  - Still with thick secretion  - CXR 10/27 - Improved pulmonary edema with persistent pleural effusions and left basilar atelectasis.  - Low threshold to repeat CXR, consider other infections or other causes     Hypotension, bradycardia   - BP and HR now improved off midodrine  - Prior hospitalization 5/2024 for multilobar PNA \"noted hypoxic bradycardia felt 2/2 vasal vagal stimulation increased secretions and position of trach with mucous plugging\"  - Ensure appropriate mgmt of secretions and trach positioning   (Was on multiple agents for BP support recently - (levophed stopped first, than hydrocortisone, than florinef, and most recently midodrine); ensure adequate hydration (BUN/Cr appropriate, sodium improved)- Flaccid tone mostly, did have some dilated reactive pupils during episode of diaphoresis and resp distress on vent - presentation and hx not c/w traditional autonomic storming  - Ensure mgmt of infection or other causes    - TTE 10/15 normal EF, diastolic function, R ventricle moderately dilated and new compared with 6/2024 study  - Completed Zosyn course 10/18  - Fluids per CCM - 200 cc free water Q4H ; TFs     Elevated Testosterone (Endocrinopathy); hx of hypogonadism   - Patient received T Cypionate 100mg IM biweekly for over a month with last dose 9/30 for hypogonadism earlier this month (T level prior 19) - this is long lasting T formulation and now 4 weeks since last IM dose  - 10/11 FT >50, Total T >1500 (Max) - still fairly high level for 11 days later (half life 8-12 d but can stay in body for several weeks gradually decreasing  - Gynecomastia  (can be " conversion of T to E, steroids) - can lead to MI, CVA, heart failure, polycythemia, mood swings, agitation, anxiety, depression, tendon injury, sleep apnea, DM, Wt gain, cog decline  - Query if high T level cause bouts of agitation/aggression recently at home which is possible based on hx from family   - Mildly elevated prolactin   - FSH, LH both low; AFP, hCG negative  - He did have some hot flashes and sweating at home and was seen by urology late Aug and was started on IM testosterone biweekly for over a month.  He was noted to have increased agitation/restlessness for a week or so (per family he did not have these prior) and then significant more agitation and anger and was recommended to hold testosterone with last dose 9/30.      - Consider Endocrinology consult     Metastatic left testicular seminoma, status post left orchiectomy, chemotherapy  - Work-up and mgmt per CCM/H-O  - AFP, hCG negative  - CT C/A/P without suspicious findings      PEG status  - Monitor site, bowel function  - TF's per primary      Hypernatremia  - Remains improved felt to be lack of free water/dehydration per nephro; treated sepsis/PNA on admission  - Per family, patient was eating and drinking orally and they would not provide PEG tube feeds if eating or drinking adequately.  Patient would not drink plain water and drank mostly flavored drinks and sometimes soda per family.   - Fluid boluses and Tfs per CCM/nutrition   - Ensure caregiver education on appropriate intake/flushes and monitoring after d/c   - Monitor  - On 150 ml fluid boluses Q4H and TF    Seizure like episodes  - Neuro felt spells may be due to vagal response and possibly convulsive syncope in setting of hypoxia/bradycardia with possible functional component.  They did not feel he needed AED.    - episode of rhythmic body jerking for several minutes on 10/10 and additional shaking episodes with hypoxia and bradycardia with hypotension requiring pressors.  He was  "placed on vEEG which captures some of these shaking spells which did not correlate with EEG seizure  -5/2024 hospitalization - also had seizure like actiity felt to not be seizure activity helped apparently with \"managing secretions and keeping O2 above 90%\"  - Now on clonazepam 0.5mg HS and weaning gabapentin per CCM      Hx of PE  - On Eliquis; Last CTPE 5/2024 negative      Bowel and bladder dysfunction  - has been chronically incontinent since after chemo per family; he usually is able to tell them when he is incontinent and they have been able to maintain hygiene   - Sheppard removed 10/2 - monitor for retention   - Incontinent stool   - Ensure adequate hygiene  - Monitor for diarrhea/constipation      Agitation, restlessness, insomnia, hx of anxiety and bipolar  (Recently administered T IM with worsening symptoms at home)  - Apparently improving  - Now on clonazepam 0.5mg HS and weaning low dose gabapentin per [facility]      Encounter for skin care; R hand skin traumatic wound  - Derm recommended oral fluconazole weekly for 4 weeks for skin fungal infection  - Wound care 10/24  Right posterior hand - traumatic wound from patient hitting hand against bedside rail, now full thickness with 70% yellow slough tissue and 30% beefy red tissue/ Donna wound with edema and erythema, scant serosanguineous drainage. Continue silver gel (NormlGel) and silicone foam dressing.      Left ear - site of previous pressure injury, now resolved. Skin is pink, epithelized and intact. Appears fragile. No open aspects or drainage noted. Can apply 3M no sting barrier film daily as protective barrier. Patient observed favoring lying to left side.    Skin care plans:  1-Hydraguard to bilateral sacrum, buttock and heels BID and PRN  2-Elevate heels to offload pressure.  3-Ehob cushion in chair when out of bed.  4-Moisturize skin daily with skin nourishing cream.  5-Turn/reposition q2h for pressure re-distribution on skin.  6-Cleanse right hand " "with normal saline, apply normalgel to wound bed, cover with silicone bordered foam. Change daily and PRN soilage/displacement.  7-left ear: cleanse with NSS soaked gauze. Pat dry. Cover area with 3M no sting barrier daily.  \"      VTE prophylaxis   As outlined by primary team      Other medical issues:     Per primary service (and relevant consultants if applicable)    Objective:    Allergies per EMR    Physical Exam:  Temp:  [98.7 °F (37.1 °C)-99.4 °F (37.4 °C)] 98.7 °F (37.1 °C)  HR:  [58-90] 61  Resp:  [14] 14  BP: (102-155)/(63-98) 119/80  SpO2:  [90 %-97 %] 95 %    General: Lying in bed - trached/vent   HENT: tongue protuding again at times; some thickened secretion wiped away from mouth and face   Respiratory: Vent  Cardiovascular: Regular rate   Gastrointestinal: Soft, non-distended, normoactive, nontender  SkiN/MSK/Extremities:  No calf edema, no calf tenderness to palpation  Neurologic/Psych:   eyes open spontaneously.  He is able to open and close eyes to command and look in all directions with fair tracking.  Patient is able to squeeze both hands to commands and slightly move legs and feet.  He is unable to adequately communicate yes-no thru head nods, hands, or other means.  10/30 BUE prox trace ; FF 2 to 4-, BLE 1-2 minus   10/23 - BUE prox trace to 2- FF 2+ to 4, BLE 1 to 2- (reflexes down still)   10/22 less ability to test as more tired - able to squeeze both hands 3-4, move toes, ankles, prox LE 1-2/5   10/21 BUE EF/EE 2-, FF 2+ to 4  BLE 1 to 2-      Diagnostic Studies: Reviewed  XR chest portable   Final Result by Rivera Matt MD (10/27 1654)      Improved pulmonary edema with persistent pleural effusions and left basilar atelectasis.            Workstation performed: [id number]         IR lumbar puncture   Final Result by Bhupinder Ohara MD (10/25 8112)   Fluoroscopic-guided diagnostic lumbar puncture.      Workstation performed: [id number]         MRI cervical spine w wo contrast   Final " Result by E. Alec Schoenberger, MD (10/23 1300)      Long segment of abnormal signal in the cervical and upper thoracic spine favored to represent nonspecific transverse myelitis.  No cord expansion, atrophy or abnormal enhancement.   Recommend clinical correlation and further evaluation with CSF. Recommend 3-month follow-up contrast-enhanced MRI.                  Workstation performed: [id number]         XR chest portable   Final Result by Quentin Brownlee DO (10/21 1622)      Limited study.      Persistent left basilar opacity may represent atelectasis and or pneumonia.      Prominent pulmonary vasculature may be accentuated by low lung volumes versus mild volume overload.      Workstation performed: [id number]         XR chest portable ICU   Final Result by Joan Mcintyre MD (10/17 2027)      Persistent extensive opacity in the left lower lobe with loss of the diaphragm which could be due to atelectasis and/or pneumonia in the appropriate clinical setting.            Workstation performed: [id number]         XR chest portable   Final Result by Tobias Leon MD (10/17 1328)      Study limited by low lung volumes. Persistent dense  left  lung base opacity which may represent a combination of effusion and parenchymal opacity.            Workstation performed: [id number]         MRI brain w wo contrast   Final Result by Andi Mccloud MD (10/17 0112)      Mildly restricted diffusion in the splenium of the corpus callosum and more subtly in the anterior cingulate gyri. Possible sequela of hypoxic ischemic injury, inflammatory or infectious process.      The study was marked in EPIC for immediate notification..      Workstation performed: [id number]         XR chest portable ICU   Final Result by Jewel Burton MD (10/15 0521)      Opacification at both lung bases is unchanged likely due to atelectasis. Infiltrates and pleural effusions not excluded.            Workstation performed:  [id number]         CT chest abdomen pelvis w contrast   Final Result by Philippe Coates MD (10/11 1426)         1. Moderate bibasilar pulmonary opacification consistent with atelectasis; superimposed pneumonia not entirely excludable. Trace left greater than right pleural effusions.   2. No acute abnormality identified in the abdomen or pelvis.   3. Additional findings as noted.               Workstation performed: [id number]         XR chest portable ICU   Final Result by Brendon Snyder MD (10/11 0356)      Left basilar effusion and atelectasis redemonstrated. Milder right basilar effusion/subsegmental atelectasis, also similar.         Workstation performed: [id number]         CT head wo contrast   Final Result by Andi Mccloud MD (10/09 2001)      No acute intracranial abnormality.                  Workstation performed: [id number]         XR chest portable ICU   Final Result by Andrea Bai MD (10/09 0823)      Left basilar effusion and atelectasis redemonstrated. Milder right basilar effusion/subsegmental atelectasis, also similar.            Workstation performed: [id number]         XR chest portable ICU   Final Result by Carlos Atkinson MD (10/07 1303)      Near complete opacification of the left hemithorax with leftward mediastinal shift, mildly improved from 10/5/2024, suggesting baseline underlying atelectasis and pleural effusion.      Note, left lung pneumonia cannot be excluded.      No pneumothorax.            Resident: SONAM KNOX I, the attending radiologist, have reviewed the images and agree with the final report above.      Workstation performed: [id number]         MRI brain w wo contrast    (Results Pending)       Laboratory: Reviewed  Results from last 7 days   Lab Units 10/30/24  0608 10/29/24  0448 10/28/24  0452   HEMOGLOBIN g/dL 11.0* 10.3* 9.6*   HEMATOCRIT % 34.3* 33.6* 30.6*   WBC Thousand/uL 20.26* 8.71 7.74     Results from last 7 days   Lab  Units 10/30/24  0608 10/29/24  0448 10/28/24  0452 10/27/24  0406   BUN mg/dL 27* 25 20 19   SODIUM mmol/L 136 134* 133* 132*   POTASSIUM mmol/L 3.9 4.0 4.1 3.8   CHLORIDE mmol/L 96 96 96 96   CREATININE mg/dL 0.39* 0.39* 0.44* 0.44*   AST U/L  --   --  45* 23   ALT U/L  --   --  88* 55*            Drug regimen reviewed, all potential adverse effects identified and addressed:    Scheduled Meds:  Current Facility-Administered Medications   Medication Dose Route Frequency Provider Last Rate    acetaminophen  650 mg Per PEG Tube Q6H PRN Ne Low MD      acetylcysteine  3 mL Nebulization Q6H Yessy Barksdale MD      apixaban  5 mg Per PEG Tube BID Jus Melendez MD      bisacodyl  10 mg Rectal Daily PRN Ne Low MD      chlorhexidine  15 mL Mouth/Throat Q12H [facility] Mohsen Moon MD      clonazePAM  0.5 mg Per G Tube HS Jus Melendez MD      fentaNYL  50 mcg Intravenous Q2H PRN AMBER Cline      fluconazole  200 mg Oral Weekly Antonella Carrasco MD      furosemide  40 mg Per PEG Tube Daily Jus Melendez MD      gabapentin  100 mg Per PEG Tube Q8H Jus Melendez MD      Followed by    [START ON 11/1/2024] gabapentin  100 mg Per PEG Tube Q12H Jus Melendez MD      Followed by    [START ON 11/3/2024] gabapentin  100 mg Per PEG Tube HS Jus Melendez MD      levalbuterol  1.25 mg Nebulization Q6H Silvio Alas MD      melatonin  3 mg Per PEG Tube HS AMBER Cline      miconazole   Topical BID Antonella Carrasco MD      oxyCODONE  5 mg Per PEG Tube Q6H PRN Jus Melendez MD         ** Please Note: Fluency Direct voice to text software may have been used in the creation of this document. **      Thank you for allowing the PM&R service to participate in the care of this patient. We will continue to follow. Please do not hesitate to call with questions or concerns.     Kilo Jennings MD, MS  [facility]  Physical Medicine and  Rehabilitation  Brain Injury Medicine

## 2024-10-30 NOTE — ASSESSMENT & PLAN NOTE
Patient has a persistent rash on his left back.  Does not cross the midline but expands across several dermatomes.  Rash is papular/pustule and crusted in places.  Rash was first noted over a week ago on this admission but patient's sister endorses rash being present over the course of a year.  They have tried over-the-counter creams for relief with no significant improvement.    10/30: Biopsy results were consistent with cutaneous superficial fungal infection particularly dermatophytosis (tinea).      Plan  Dermatology on board recommend:  200 mg fluconazole weekly for 4 weeks  Start topical miconazole cream twice daily on back

## 2024-10-30 NOTE — PHYSICAL THERAPY NOTE
PHYSICAL THERAPY TREATMENT NOTE          Patient Name: Hemanth Swanson  Today's Date: 10/30/2024          AGE:   39 y.o.  Mrn:   [id number]  ADMIT DX:  Fever [R50.9]    Past Medical History:  Past Medical History:   Diagnosis Date    Anxiety     Cancer (HCC)     Depression     Hypernatremia 10/06/2024    Migration of percutaneous endoscopic gastrostomy (PEG) tube (HCC) 09/24/2023    Psychiatric disorder     bipolar          10/30/24 0937   Pain Assessment   Pain Assessment Tool FLACC  (pt declined pain throughout when asked)   Pain Rating: FLACC (Rest) - Face 0   Pain Rating: FLACC (Rest) - Legs 0   Pain Rating: FLACC (Rest) - Activity 0   Pain Rating: FLACC (Rest) - Cry 0   Pain Rating: FLACC (Rest) - Consolability 0   Score: FLACC (Rest) 0   Pain Rating: FLACC (Activity) - Face 0   Pain Rating: FLACC (Activity) - Legs 0   Pain Rating: FLACC (Activity) - Activity 0   Pain Rating: FLACC (Activity) - Cry 0   Pain Rating: FLACC (Activity) - Consolability 0   Score: FLACC (Activity) 0   Restrictions/Precautions   Weight Bearing Precautions Per Order No   Other Precautions Cognitive;Chair Alarm;Bed Alarm;Multiple lines;Fall Risk  (trach+vent (chronic), G tube)   General   Additional Pertinent History Contacted by Resident Dr. Trenton Pearce to re-assess pt's post intervention. Pt completed 5 days of IVIG treatment   Family/Caregiver Present No   Cognition   Overall Cognitive Status Impaired   Arousal/Participation Cooperative   Orientation Level Oriented to person  (responds to first name)   Memory Unable to assess   Following Commands Follows one step commands with increased time or repetition  (pt able to follow ~70-80% of 1 step verbal commands w/ repetition)   Comments Pt non-verbal (trach+vent), able to mouth yes/no vs shaking/nodding head to answer simple questions. Smiles when asked about the Jim Cowbocarmen   RUE Assessment   RUE Assessment   (  strength ~3+/5, 1/5 for digit extension, pt continues to report intact sensation to BUE)   RUE Strength   R Elbow Flexion 0/5   R Elbow Extension 0/5   R Forearm Supination 2-/5   LUE Assessment   LUE Assessment   ( strength ~3+/5, 1/5 for digit extension, pt continues to report intact sensation to BUE)   LUE Strength   L Elbow Flexion 3-/5   L Elbow Extension 3-/5   L Forearm Supination 3-/5   Strength RLE   R Hip Flexion 2-/5   R Hip ABduction 2-/5   R Hip ADduction 2-/5   R Knee Flexion 2+/5   R Knee Extension 2-/5   R Ankle Dorsiflexion 2-/5   R Ankle Plantar Flexion 3-/5   Strength LLE   L Hip Flexion 2-/5   L Hip ABduction 2-/5   L Hip ADduction 2-/5   L Knee Flexion 2-/5   L Knee Extension 2/5   L Ankle Dorsiflexion 2-/5   L Ankle Plantar Flexion 3-/5   Light Touch   RLE Light Touch Grossly intact  (pt reports intact sensation to heel, shin, anterior thigh)   LLE Light Touch Grossly intact  (pt reports intact sensation to heel, shin, anterior thigh)   Bed Mobility   Rolling R 1  Dependent   Additional items Assist x 2;Increased time required;Verbal cues;LE management   Rolling L 1  Dependent   Additional items Assist x 2;Increased time required;Verbal cues;LE management   Additional Comments pt dependently rolled bilaterally to allow for lars change and pericare. RT, RN, and PCA present to assist as needed   Activity Tolerance   Activity Tolerance   (pt limited by cognition, medical status)   Medical Staff Made Aware Resident Dr. Trenton Pearce updated post, RT Reyna   Nurse Made Aware JEAN-PAUL Gonzalez   Assessment   Prognosis Guarded   Problem List Decreased strength;Decreased range of motion;Decreased mobility;Decreased cognition   Assessment PT treatment provided today to address deficits of: impaired strength, impaired mobility and to reassess pt's strength/mobility post IVIG treatment intervention. Compared to previous session, pt did not appear to improve in RUE strength w/ decreased strength in  his LUE. Additionally, pt demonstrated overall decreased strength in his BLE more pronounced in the RLE compared to previous evaluation. However, pt was able to tolerate bilateral rolling bed mobility w/o respiratory event. Pt remains limited in functional mobility due to impaired cognition, impaired activity tolerance, generalized weakness/deconditioning, impaired strength, and medical status. Pt will continue benefit from PT to promote independence w/ functional mobility, address mobility and strength deficits, and progress towards set goals. Recommend DC w/ level: II (Moderate Rehab Resource Intensity) when medically cleared.   Goals   STG Expiration Date 11/08/24   Short Term Goal #1 Pt will: increase BLE strength to at least 3+/5 to increase pt's ability to reposition; tolerate at least 45* HOB in chair position for 30 min to demonstrate pt tolerance to an upright sitting position; perform bilateral rolling bed mobility w/ max Ax1 to decrease pt's burden of care. PT to see for progression of mobility when appropriate   PT Treatment Day 1   Plan   Treatment/Interventions LE strengthening/ROM;Therapeutic exercise;Endurance training;Cognitive reorientation;Patient/family training;Equipment eval/education;Bed mobility;Compensatory technique education   PT Frequency 1-2x/wk   Discharge Recommendation   Rehab Resource Intensity Level, PT II (Moderate Resource Intensity)   AM-PAC Basic Mobility Inpatient   Turning in Flat Bed Without Bedrails 1   Lying on Back to Sitting on Edge of Flat Bed Without Bedrails 1   Moving Bed to Chair 1   Standing Up From Chair Using Arms 1   Walk in Room 1   Climb 3-5 Stairs With Railing 1   Basic Mobility Inpatient Raw Score 6   [facility] Highest Level Of Mobility   -[facility] Goal 2: Bed activities/Dependent transfer   -HL Achieved 2: Bed activities/Dependent transfer   End of Consult   Patient Position at End of Consult Supine  (RN present in room)       The patient's AM-PAC Basic  Mobility Inpatient Short Form Raw Score is 6. A Raw score of less than or equal to 16 suggests the patient may benefit from discharge to post-acute rehabilitation services. Please also refer to the recommendation of the Physical Therapist for safe discharge planning.    Pt will continue to benefit from skilled inpatient PT during this admission in order to facilitate progress towards goals and to maximize pt's independence w/ functional mobility.    DC rec: level II (Moderate Rehab Resource Intensity)      Mile Maciel, PT, DPT  10/30/24

## 2024-10-30 NOTE — PROGRESS NOTES
Patient seen with primary caretaker, sister Dmitry.  We discussed his current ventilator settings and she was able to demonstrate comfortability in responding to issues with this device.      I explained to her, in detail, the risks of taking him home versus taking him to a skilled nursing facility or LTACH which might be capable of handling his chronic needs.  She again re-iterates she will not allow him to go to that type of facility due to having poor experience at a facility previously.  I re-iterated that this was still our recommendation due to his condition upon arrival on this admission.    She understands the risks of taking him home, including his ongoing desaturations with bradycardia which continue to be a daily issue for him.  She reports he has had these previously and she is comfortable managing them.  We discussed the risks of sudden cardiac death and respiratory failure, and she acknowledges these risks, but maintains her desire to bring him home.      We have previously discussed with case management and the respiratory therapists who take care of him at home.  His respiratory therapist in particular cites no concerns about his home environment.      Although this is not our preferred outcome for this patient, we believe the patient's sister has demonstrated adequate knowledge base to be able to provide this care for him, and as his primary caretaker and medical POA, she maintains the right to make medical decisions for him.    Will discharge as previously planned today via ALS transport.

## 2024-10-30 NOTE — ASSESSMENT & PLAN NOTE
Noted with abnormal MRI concerning for hypoxic injury  Neurology on board recommendations to repeat paraneoplastic workup repeat MRI in about a month  MRI-C Spine: Transverse myelitis not acute no cord expansion versus sequelae from old ischemia.    Plan  Solu-Medrol today 5 days completed  IVIG today 5 days completed

## 2024-10-31 ENCOUNTER — APPOINTMENT (INPATIENT)
Dept: RADIOLOGY | Facility: HOSPITAL | Age: 39
DRG: 720 | End: 2024-10-31
Payer: COMMERCIAL

## 2024-10-31 VITALS
WEIGHT: 222.88 LBS | DIASTOLIC BLOOD PRESSURE: 77 MMHG | HEIGHT: 76 IN | TEMPERATURE: 99.5 F | RESPIRATION RATE: 14 BRPM | HEART RATE: 84 BPM | BODY MASS INDEX: 27.14 KG/M2 | OXYGEN SATURATION: 99 % | SYSTOLIC BLOOD PRESSURE: 123 MMHG

## 2024-10-31 LAB
AMORPH URATE CRY URNS QL MICRO: ABNORMAL
BACTERIA UR QL AUTO: ABNORMAL /HPF
BILIRUB UR QL STRIP: NEGATIVE
CLARITY UR: ABNORMAL
COLOR UR: YELLOW
GLUCOSE UR STRIP-MCNC: NEGATIVE MG/DL
HGB UR QL STRIP.AUTO: NEGATIVE
KETONES UR STRIP-MCNC: NEGATIVE MG/DL
LEUKOCYTE ESTERASE UR QL STRIP: NEGATIVE
MUCOUS THREADS UR QL AUTO: ABNORMAL
NITRITE UR QL STRIP: NEGATIVE
NON-SQ EPI CELLS URNS QL MICRO: ABNORMAL /HPF
PH UR STRIP.AUTO: 8 [PH]
PROT UR STRIP-MCNC: ABNORMAL MG/DL
RBC #/AREA URNS AUTO: ABNORMAL /HPF
SP GR UR STRIP.AUTO: 1.03 (ref 1–1.03)
UROBILINOGEN UR STRIP-ACNC: 8 MG/DL
WBC #/AREA URNS AUTO: ABNORMAL /HPF

## 2024-10-31 PROCEDURE — 94640 AIRWAY INHALATION TREATMENT: CPT

## 2024-10-31 PROCEDURE — 71045 X-RAY EXAM CHEST 1 VIEW: CPT

## 2024-10-31 PROCEDURE — 94003 VENT MGMT INPAT SUBQ DAY: CPT

## 2024-10-31 PROCEDURE — 94760 N-INVAS EAR/PLS OXIMETRY 1: CPT

## 2024-10-31 PROCEDURE — 94150 VITAL CAPACITY TEST: CPT

## 2024-10-31 PROCEDURE — 81001 URINALYSIS AUTO W/SCOPE: CPT

## 2024-10-31 PROCEDURE — 99232 SBSQ HOSP IP/OBS MODERATE 35: CPT | Performed by: INTERNAL MEDICINE

## 2024-10-31 PROCEDURE — 94669 MECHANICAL CHEST WALL OSCILL: CPT

## 2024-10-31 RX ORDER — SODIUM CHLORIDE, SODIUM GLUCONATE, SODIUM ACETATE, POTASSIUM CHLORIDE, MAGNESIUM CHLORIDE, SODIUM PHOSPHATE, DIBASIC, AND POTASSIUM PHOSPHATE .53; .5; .37; .037; .03; .012; .00082 G/100ML; G/100ML; G/100ML; G/100ML; G/100ML; G/100ML; G/100ML
500 INJECTION, SOLUTION INTRAVENOUS ONCE
Status: COMPLETED | OUTPATIENT
Start: 2024-10-31 | End: 2024-10-31

## 2024-10-31 RX ADMIN — ACETYLCYSTEINE 600 MG: 200 SOLUTION ORAL; RESPIRATORY (INHALATION) at 13:19

## 2024-10-31 RX ADMIN — LEVALBUTEROL HYDROCHLORIDE 1.25 MG: 1.25 SOLUTION RESPIRATORY (INHALATION) at 07:15

## 2024-10-31 RX ADMIN — APIXABAN 5 MG: 5 TABLET, FILM COATED ORAL at 17:01

## 2024-10-31 RX ADMIN — MICONAZOLE NITRATE: 20 CREAM TOPICAL at 17:01

## 2024-10-31 RX ADMIN — APIXABAN 5 MG: 5 TABLET, FILM COATED ORAL at 08:54

## 2024-10-31 RX ADMIN — MICONAZOLE NITRATE: 20 CREAM TOPICAL at 09:01

## 2024-10-31 RX ADMIN — FUROSEMIDE 40 MG: 10 SOLUTION ORAL at 08:54

## 2024-10-31 RX ADMIN — LEVALBUTEROL HYDROCHLORIDE 1.25 MG: 1.25 SOLUTION RESPIRATORY (INHALATION) at 13:19

## 2024-10-31 RX ADMIN — SODIUM CHLORIDE, SODIUM GLUCONATE, SODIUM ACETATE, POTASSIUM CHLORIDE, MAGNESIUM CHLORIDE, SODIUM PHOSPHATE, DIBASIC, AND POTASSIUM PHOSPHATE 500 ML: .53; .5; .37; .037; .03; .012; .00082 INJECTION, SOLUTION INTRAVENOUS at 00:24

## 2024-10-31 RX ADMIN — ACETYLCYSTEINE 600 MG: 200 SOLUTION ORAL; RESPIRATORY (INHALATION) at 03:05

## 2024-10-31 RX ADMIN — GABAPENTIN 100 MG: 250 SOLUTION ORAL at 08:58

## 2024-10-31 RX ADMIN — ACETYLCYSTEINE 600 MG: 200 SOLUTION ORAL; RESPIRATORY (INHALATION) at 07:15

## 2024-10-31 RX ADMIN — CHLORHEXIDINE GLUCONATE 15 ML: 1.2 RINSE ORAL at 08:54

## 2024-10-31 RX ADMIN — LEVALBUTEROL HYDROCHLORIDE 1.25 MG: 1.25 SOLUTION RESPIRATORY (INHALATION) at 03:05

## 2024-10-31 RX ADMIN — GABAPENTIN 100 MG: 250 SOLUTION ORAL at 17:00

## 2024-10-31 NOTE — PROGRESS NOTES
Progress Note - Critical Care/ICU   Name: Hemanth Swanson 39 y.o. male I MRN: [id number]  Unit/Bed#: ICU 05 I Date of Admission: 10/5/2024   Date of Service: 10/31/2024 I Hospital Day: 26       Assessment & Plan  Toxic metabolic encephalopathy  Intermittent periods of agitation, restless however alert oriented x 4 without loss of consciousness over the past few days  MRI on 10/16 with hypoxic brain injury findings  MRI C-Spine on 10/23: Findings favor nonspecific transverse myelitis; no sign of acuity due to lack of cord expansion. Unable to rule out ramifications from prior ischemia.  Goals of care discussions result: continue with disease treatment palliative care followed- now signed off as goals of care are clear.  Neurology following recommendations to repeat paraneoplastic workup and an MRI in about a month  [id number]: Culture/stain 2+ poly, 2+ gram-positive rods, 1+ gram-negative rods growing.    Copper 96 ug/dL                                   CSF Gram stain +1 CSF fluid to is clear glucose of 54, protein 32  CSF culture: Growth in broth culture only Candida albicans; likely contamination    Plan  Continue monitor mental status  Per family patient is at baseline events has been persistent over the past year  Regards to restless agitation bearing-down has responded to gabapentin now at 300 TID and Ativan as needed; episodes have reduced intensity   Continue monitoring metabolic derangement  Completed 10-day course of antibiotics for Pseudomonas and Proteus mirabilis pneumonia  Monitor off of antibiotics likely colonization   Transverse myelitis w/u ordered: Serum NMO IgG autoantibodies, copper level, Sjogren's antibodies, vitamin B12, folate, vitamin B1, RPR, Lyme, vitamin D 25; largely WNL exception Vit. D0 MS panel and MOG still pending.   LP completed f/u pending results; STAT Gram stain showing 1+ mononuclear cells, ME panel negative  Chronic respiratory failure with hypoxia and hypercapnia  (HCC)  Neuromuscular disease post chemotherapyS/P tracheostomy in 9/2023.  He continues to fail SBT likely in the setting of diaphragmatic weakness in the setting of progressive demyelinating/ neuromuscular disease  Goals of care discussions continues disease driven  X-ray showed worsening opacities 10/21  Multiple bronchoscopies resulted thick mucus secretions  Sputum/bronc culture showing growth of Pseudomonas (s/p 10 days treatment for Pseudomonas/Proteus)  Patient is drowsy  Patient tried and failed to produce sputum on his own hypertonic saline, chest PT and suctioning.  Family/caregivers were unable to perform this at home due to physical limitations.  Patient's cognitive level is also contributing to the ability to mobilize secretions, increasing the risk for further infections and hospitalization.    10/30: Patient's WBC up trended; will obtain a differential due to recent steroid usage.   10/31: Patient tmax 100.7, down trended with no intervention, UA and CXR unremarkable.     Plan:  Continue vent support  Will monitor lethargy while on BiPaP   Xopnex and hypertonic saline nebs 3 times daily  Re-started chest therapy (previously halted due to concern they were precipitating vasovagal episodes)  Consider bronchoscopy for symptomatic relief as needed    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HCC)  Noted with abnormal MRI concerning for hypoxic injury  Neurology on board recommendations to repeat paraneoplastic workup repeat MRI in about a month  MRI-C Spine: Transverse myelitis not acute no cord expansion versus sequelae from old ischemia.    Plan  Solu-Medrol today 5 days completed  IVIG today 5 days completed  Bradycardia  Intermittent bradycardia not related to hypoxia.   He has been maintaining heart rates above 60 since medication adjustments.  Seroquel discontinue  Heart rate between episodes fluctuates from low 80s high 90s.  Continues to experience desaturations/bradycardic episodes likely vasovagal in nature  Removed  scheduled benzodiazepines and opioids.  Episodes continue but have decreased in intensity     Plan  -Currently treating with manual bagging/suction, verbal encouragement  -Tapering gabapentin; not effective  Sepsis (HCC)  Sepsis likely secondary to multifocal pneumonia in the setting of Proteus and Pseudomonas day 10 on antibiotics today for completion.   Now shock undifferentiated consider hypovolemic in the setting of insensible losses, echocardiogram has been remained unremarkable doubt sepsis at this time given adequate treatment on antibiotics for 10 days  Blood pressure remained with maps above 70 overnight.  He did not require pressors.  Will consider POCUS disease continuing evaluate IVC urine output has slowed down    WBC up trended today will obtain differential.     Plan  If pressures are low, will trial of 500 cc Isolyte with monitoring urinary output.   Seizure-like activity (HCC)  No evidence of seizure on EMU on multiple admissions including current one  Seizure-like activity is likely stereotypical behaviors likely related to agitation; responding well to Ativan as needed  Umbilical hernia without obstruction and without gangrene  Evaluated by surgery.  Found to not be a surgical candidate due to ongoing comorbidities.  Fat containing incarcerated hernia without bowel.  Palliative care by specialist  Palliative care is following.  Had long discussion with family about goals of care.  They would like to continue all measures.  The are willing to care for him at home.  They reiterated their wishes that he would not like to be sent to a rehab facility.  Goals of care, counseling/discussion  Goals of care discussions multiple admissions including current 1 palliative care follow-up continues with disease driven treatments  Discussed with mother, sister patient unfortunately continues with poor prognosis  Patient has 24/7 support at home  Case management following assistance upon discharge   Rash of  back  Patient has a persistent rash on his left back.  Does not cross the midline but expands across several dermatomes.  Rash is papular/pustule and crusted in places.  Rash was first noted over a week ago on this admission but patient's sister endorses rash being present over the course of a year.  They have tried over-the-counter creams for relief with no significant improvement.    10/30: Biopsy results were consistent with cutaneous superficial fungal infection particularly dermatophytosis (tinea).      Plan  Dermatology on board recommend:  200 mg fluconazole weekly for 4 weeks  Start topical miconazole cream twice daily until rash resolution   Disposition: Critical care    ICU Core Measures     Vented Patient  VAP Bundle  VAP bundle ordered     A: Assess, Prevent, and Manage Pain Has pain been assessed? Yes  Need for changes to pain regimen? No   B: Both Spontaneous Awakening Trials (SATs) and Spontaneous Breathing Trials (SBTs) Plan to perform spontaneous awakening trial today? Chronic vent   Plan to perform spontaneous breathing trial today? Chronic vent   Obvious barriers to extubation? Yes   C: Choice of Sedation RASS Goal: 0 Alert and Calm  Need for changes to sedation or analgesia regimen? No   D: Delirium CAM-ICU: Negative   E: Early Mobility  Plan for early mobility? Yes   F: Family Engagement Plan for family engagement today? No       Antibiotic Review: Patient on appropriate coverage based on culture data.     Review of Invasive Devices:      Central access plan: Medications requiring central line      Prophylaxis:  VTE VTE covered by:  apixaban, Per PEG Tube, 5 mg at 10/30/24 1706       Stress Ulcer  not ordered         24 Hour Events : Patient continued to have vasovagal episodes that respond well to verbal stimulation; occasional bagging required.   Subjective Patient alert and following writer throughout the room.       Objective :                   Vitals I/O      Most Recent Min/Max in 24hrs    Temp 99.3 °F (37.4 °C) Temp  Min: 98.3 °F (36.8 °C)  Max: 100.7 °F (38.2 °C)   Pulse 67 Pulse  Min: 58  Max: 81   Resp 14 No data recorded   /62 BP  Min: 85/48  Max: 129/85   O2 Sat 93 % SpO2  Min: 88 %  Max: 98 %      Intake/Output Summary (Last 24 hours) at 10/31/2024 0657  Last data filed at 10/31/2024 0600  Gross per 24 hour   Intake 3500 ml   Output 275 ml   Net 3225 ml       Diet NPO    Invasive Monitoring           Physical Exam   Physical Exam  Eyes:      General: No foreign body in eyeNo scleral icterus.        Right eye: No discharge.         Left eye: No discharge.   Skin:     Findings: Rash and wound present.   HENT:      Head: Normocephalic.   Neck:      Trachea: Tracheostomy present.   Cardiovascular:      Rate and Rhythm: Normal rate and regular rhythm.   Musculoskeletal:         General: Swelling and signs of injury present.      Right lower leg: Trace Edema present.      Left lower leg: Trace Edema present.   Abdominal: General: Bowel sounds are normal.      Palpations: Abdomen is soft.      Hernia: A hernia is present.   Constitutional:       General: He is not in acute distress.     Interventions: He is intubated and restrained.   Pulmonary:      Effort: Pulmonary effort is normal. He is intubated.      Breath sounds: Rhonchi present.   Neurological:      Mental Status: He is alert. He is calm.      Cranial Nerves: No facial asymmetry.   Genitourinary/Anorectal:  external catheter present.        Diagnostic Studies        Lab Results: I have reviewed the following results:     Medications:  Scheduled PRN   acetylcysteine, 3 mL, Q6H  apixaban, 5 mg, BID  chlorhexidine, 15 mL, Q12H ISAEL  clonazePAM, 0.5 mg, HS  fluconazole, 200 mg, Weekly  furosemide, 40 mg, Daily  gabapentin, 100 mg, Q8H   Followed by  [START ON 11/1/2024] gabapentin, 100 mg, Q12H   Followed by  [START ON 11/3/2024] gabapentin, 100 mg, HS  levalbuterol, 1.25 mg, Q6H  melatonin, 3 mg, HS  miconazole, , BID      acetaminophen,  650 mg, Q6H PRN  bisacodyl, 10 mg, Daily PRN  fentaNYL, 50 mcg, Q2H PRN  oxyCODONE, 5 mg, Q6H PRN       Continuous          Labs:   CBC    Recent Labs     10/30/24  0608   WBC 20.26*   HGB 11.0*   HCT 34.3*        BMP    Recent Labs     10/30/24  0608   SODIUM 136   K 3.9   CL 96   CO2 40*   AGAP 0*   BUN 27*   CREATININE 0.39*   CALCIUM 8.3*       Coags    No recent results     Additional Electrolytes  Recent Labs     10/30/24  0608   MG 2.0   PHOS 2.0*   CAIONIZED 1.12          Blood Gas    No recent results  No recent results LFTs  No recent results    Infectious  No recent results  Glucose  Recent Labs     10/30/24  0608   GLUC 135

## 2024-10-31 NOTE — CASE MANAGEMENT
Case Management Progress Note    Patient name Hemanth Swanson  Location ICU 05/ICU 05 MRN [id number]  : 1985 Date 10/31/2024       LOS (days): 26  Geometric Mean LOS (GMLOS) (days):   Days to GMLOS:        OBJECTIVE:        Current admission status: Inpatient  Preferred Pharmacy:   [id number] - [place], [place] - [address]  [address] [address]  Phone: [phone] Fax: [phone]    Primary Care Provider: Ela Douglas MD    Primary Insurance: [facility]  Secondary Insurance:     PROGRESS NOTE:    CM notified by [facility] that they are no longer able to transport pt. Pt will be transported by [facility] at 6 pm. RISHABH spoke with pt's sister - Dmitry. She is ok with this time and will have family there to manage the vent for transport. RN and resident aware. Charley with [facility] also made aware of home dc for today.    Dmitry asking about an airloss mattress. Aware CM will f/u after speaking with [facility].     CM spoke with [employer] liaison - Rickey. Rickey reports that pt would not qualify unless he had a stage 3 or stage 4 wound on his back or buttocks.     RISHABH spoke with Dmitry and updated her of same. She does report pt has a gel overlay, but is flat. Aware RISHABH will have [facility] reach out.     Rickey made aware and will have a team member reach out.

## 2024-10-31 NOTE — RESPIRATORY THERAPY NOTE
RT Ventilator Management Note  Hemanth Swanson 39 y.o. male MRN: [id number]  Unit/Bed#: ICU 05 Encounter: [id number]      Daily Screen         10/30/2024  0729 10/31/2024  0715          Patient safety screen outcome:: Failed Failed (P)       Not Ready for Weaning due to:: Underline problem not resolved Underline problem not resolved (P)                 Physical Exam:   Assessment Type: Post-treatment  General Appearance: Awake  Respiratory Pattern: Assisted  Chest Assessment: Chest expansion symmetrical  Bilateral Breath Sounds: Coarse  Suction: Trach  O2 Device: vent      Resp Comments: Patient recieved on documented vent settings. Vent orders match vent. Patient TX  given via aerogen. Trach care done. In-line suction changed due to thick yellow secretions. Trach supplies refilled. Patient remains on documented settings.     10/31/24 0715   Respiratory Assessment   Assessment Type Post-treatment   General Appearance Awake   Respiratory Pattern Assisted   Chest Assessment Chest expansion symmetrical   Bilateral Breath Sounds Coarse   Suction Trach   Resp Comments Patient recieved on documented vent settings. Vent orders match vent. Patient TX  given via aerogen. Trach care done. In-line suction changed due to thick yellow secretions. Trach supplies refilled. Patient remains on documented settings.   O2 Device vent   Vent Information   Vent    Vent type     Vent Mode AC/VC   $ Vital Capacity Mech/Peak Flow Yes   $ Pulse Oximetry Spot Check Charge Completed   SpO2 98 %   AC/VC Settings   Resp Rate (BPM) 14 BPM   Vt (mL) 500 mL   FIO2 (%) 40 %   PEEP (cmH2O) 8 cmH2O   Flow Pattern (LPM) 55 L/min   Trigger Sensitivity Flow (lpm) 3 %   Humidification Heater   Heater Temperature (Set) 98.6 °F (37 °C)   AC/VC Actuals   Resp Rate (BPM) 14 BPM   VT (mL) 517   MV 7.18   MAP (cmH2O) 13 cmH2O   Peak Pressure (cmH2O) 28 cmH2O   I/E Ratio (Obs) 1:3.3   Heater Temperature (Obs) 98.6 °F (37 °C)   Static Compliance  (mL/cmH20) 37 mL/cmH2O   Plateau Pressure (cm H2O) 22 cm H2O   AC/VC Alarms   High Peak Pressure (cmH2O) 45   High Resp Rate (BPM) 40 BPM   High MV (L/min) 16 L/min   Low MV (L/min) 3 L/min   Vt High (mL) 900 mL   Vt Low (mL) 550 mL   AC/VC Apnea Settings   Resp Rate (BPM) 14 BPM   VT (mL) 500 mL   FIO2 (%) 100 %   Apnea Time (s) 20 S   Apnea Flow (L/min) 55 L/min   Maintenance   Alarm (pink) cable attached Yes   Resuscitation bag with peep valve at bedside Yes   Water bag changed No   Circuit changed No   Daily Screen   Patient safety screen outcome: Failed   Not Ready for Weaning due to: Underline problem not resolved   IHI Ventilator Associated Pneumonia Bundle   Head of Bed Elevated HOB 45   Surgical Airway Jie Distal   Placement Date/Time: 10/30/24 1445   Tube Size: 6  Placed By: Physician  Placed by (Name): Dr. Silvio Alas  Type: Tracheostomy  Brand: Jie  Style: Distal   Status Cuff Inflated   Site Assessment Oozing secretions   Site Care Cleansed;Dried;Dressing applied   Inner Cannula Care Changed/new   Ties Assessment Secure;Intact;Dry   Surgical Airway Cuff Pressure (color) Green   Equipment at bedside BVM;Wall Suction setup;Additional complete same size trach tube;Obturator;Sterile saline;Additional inner cannula

## 2024-10-31 NOTE — ASSESSMENT & PLAN NOTE
Intermittent periods of agitation, restless however alert oriented x 4 without loss of consciousness over the past few days  MRI on 10/16 with hypoxic brain injury findings  MRI C-Spine on 10/23: Findings favor nonspecific transverse myelitis; no sign of acuity due to lack of cord expansion. Unable to rule out ramifications from prior ischemia.  Goals of care discussions result: continue with disease treatment palliative care followed- now signed off as goals of care are clear.  Neurology following recommendations to repeat paraneoplastic workup and an MRI in about a month  [id number]: Culture/stain 2+ poly, 2+ gram-positive rods, 1+ gram-negative rods growing.    Copper 96 ug/dL                                   CSF Gram stain +1 CSF fluid to is clear glucose of 54, protein 32  CSF culture: Growth in broth culture only Candida albicans; likely contamination    Plan  Continue monitor mental status  Per family patient is at baseline events has been persistent over the past year  Regards to restless agitation bearing-down has responded to gabapentin now at 300 TID and Ativan as needed; episodes have reduced intensity   Continue monitoring metabolic derangement  Completed 10-day course of antibiotics for Pseudomonas and Proteus mirabilis pneumonia  Monitor off of antibiotics likely colonization   Transverse myelitis w/u ordered: Serum NMO IgG autoantibodies, copper level, Sjogren's antibodies, vitamin B12, folate, vitamin B1, RPR, Lyme, vitamin D 25; largely WNL exception Vit. D0 MS panel and MOG still pending.   LP completed f/u pending results; STAT Gram stain showing 1+ mononuclear cells, ME panel negative

## 2024-10-31 NOTE — ASSESSMENT & PLAN NOTE
Patient has a persistent rash on his left back.  Does not cross the midline but expands across several dermatomes.  Rash is papular/pustule and crusted in places.  Rash was first noted over a week ago on this admission but patient's sister endorses rash being present over the course of a year.  They have tried over-the-counter creams for relief with no significant improvement.    10/30: Biopsy results were consistent with cutaneous superficial fungal infection particularly dermatophytosis (tinea).      Plan  Dermatology on board recommend:  200 mg fluconazole weekly for 4 weeks  Start topical miconazole cream twice daily until rash resolution

## 2024-10-31 NOTE — WOUND OSTOMY CARE
Progress Note - Wound   Hemanth Swanson 39 y.o. male MRN: [id number]  Unit/Bed#: ICU 05 Encounter: [id number]        Assessment:   Patient is seen for wound care follow-up. . Patient in ICU, on low air loss mattress, wedges and true fredi boots in place for offloading. Patient is incontinent of bowel and indwelling puga catheter in place for urine management. Patient is dependent with all care. Patient receives enteral nutrition.     Findings:  B/L heels are dry intact and tiffanie with no skin loss or wounds present. Recommend preventative Hydraguard Cream and proper offloading/ repositioning.      B/L sacrum and buttocks is dry intact and blanches with no skin loss or wounds present; scar tissue noted. Recommend preventative Hydraguard Cream and proper offloading/ repositioning.     Right posterior hand - traumatic wound from patient hitting hand against bedside rail, now full thickness with 100% yellow/brown adherent tissue. Donna wound with edema and erythema, scant serosanguineous drainage. Continue silver gel (NormlGel) and silicone foam dressing.      Left ear - site of previous pressure injury, now resolved. Skin is pink, epithelized and intact. Appears fragile. No open aspects or drainage noted. Can apply 3M no sting barrier film daily as protective barrier. Patient observed favoring lying to left side.       No induration, fluctuance, odor, warmth/temperature differences, redness, or purulence noted to the above noted wounds and skin areas assessed. New dressings applied per orders listed below. Patient tolerated well- no s/s of non-verbal pain or discomfort observed during the encounter. Bedside nurse aware of plan of care. See flow sheets for more detailed assessment findings.      Orders listed below and wound care will continue to follow, call or Secure Chat with questions.     Skin care plans:  1-Hydraguard to bilateral sacrum, buttock and heels BID and PRN  2-Elevate heels to offload pressure.  3-Ehob  cushion in chair when out of bed.  4-Moisturize skin daily with skin nourishing cream.  5-Turn/reposition q2h for pressure re-distribution on skin.  6-Cleanse right hand with normal saline, apply normalgel to wound bed, cover with silicone bordered foam. Change daily and PRN soilage/displacement.  7-left ear: cleanse with NSS soaked gauze. Pat dry. Cover area with 3M no sting barrier daily.   8-Utilize Pyroliaoise Repositioning System and air support mattress overlay for standard mattress upon transfer or discharge        WOUNDS:  Wound 10/08/24 Traumatic Skin tear Hand Posterior;Right (Active)   Wound Image   10/31/24 0917   Wound Description Yellow;Brown;Dry 10/31/24 0917   Donna-wound Assessment Pink;Intact;Edema 10/31/24 0917   Wound Length (cm) 0.9 cm 10/31/24 0917   Wound Width (cm) 2 cm 10/31/24 0917   Wound Depth (cm) 0.2 cm 10/24/24 1131   Wound Surface Area (cm^2) 1.8 cm^2 10/31/24 0917   Wound Volume (cm^3) 0.24 cm^3 10/24/24 1131   Calculated Wound Volume (cm^3) 0.24 cm^3 10/24/24 1131   Change in Wound Size % 25 10/24/24 1131   Wound Site Closure Open to air 10/22/24 0400   Drainage Amount None 10/31/24 0917   Drainage Description Serosanguineous 10/24/24 1131   Non-staged Wound Description Full thickness 10/31/24 0917   Treatments Cleansed;Site care 10/31/24 0917   Dressing Foam, Silicon (eg. Allevyn, etc);Silvasorb gel 10/31/24 1200   Wound packed? No 10/31/24 0917   Packing- # removed 0 10/31/24 0917   Packing- # inserted 0 10/31/24 0917   Dressing Changed New 10/31/24 0917   Patient Tolerance Tolerated well 10/31/24 0917   Dressing Status Clean;Intact;Dry 10/31/24 1200                Radha Pate RN, BSN, CCRN

## 2024-10-31 NOTE — ASSESSMENT & PLAN NOTE
Neuromuscular disease post chemotherapyS/P tracheostomy in 9/2023.  He continues to fail SBT likely in the setting of diaphragmatic weakness in the setting of progressive demyelinating/ neuromuscular disease  Goals of care discussions continues disease driven  X-ray showed worsening opacities 10/21  Multiple bronchoscopies resulted thick mucus secretions  Sputum/bronc culture showing growth of Pseudomonas (s/p 10 days treatment for Pseudomonas/Proteus)  Patient is drowsy  Patient tried and failed to produce sputum on his own hypertonic saline, chest PT and suctioning.  Family/caregivers were unable to perform this at home due to physical limitations.  Patient's cognitive level is also contributing to the ability to mobilize secretions, increasing the risk for further infections and hospitalization.    10/30: Patient's WBC up trended; will obtain a differential due to recent steroid usage.   10/31: Patient tmax 100.7, down trended with no intervention, UA and CXR unremarkable.     Plan:  Continue vent support  Will monitor lethargy while on BiPaP   Xopnex and hypertonic saline nebs 3 times daily  Re-started chest therapy (previously halted due to concern they were precipitating vasovagal episodes)  Consider bronchoscopy for symptomatic relief as needed

## 2024-10-31 NOTE — CASE MANAGEMENT
Case Management Discharge Planning Note    Patient name Hemanth Swanson  Location ICU 05/ICU 05 MRN [id number]  : 1985 Date 10/31/2024       Current Admission Date: 10/5/2024  Current Admission Diagnosis:Toxic metabolic encephalopathy   Patient Active Problem List    Diagnosis Date Noted Date Diagnosed    Rash of back 10/25/2024     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HCC) 10/17/2024     Palliative care by specialist 10/11/2024     Goals of care, counseling/discussion 10/11/2024     Toxic metabolic encephalopathy 10/07/2024     Cardiac arrest due to other underlying condition (HCC) 2024     Seizure-like activity (HCC) 2024     Bradycardia 2024     Ventilator dependent (HCC) 2024     Obesity hypoventilation syndrome (HCC) 10/24/2023     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     Psychophysiological insomnia 10/18/2023     Impaired mobility 2023     Status post tracheostomy ([facility]) 2023     S/P percutaneous endoscopic gastrostomy (PEG) tube placement ([facility]) 2023     Anxiety 2023     Chronic respiratory failure with hypoxia and hypercapnia ([facility]) 2023     Sepsis (HCC) 2023     Acute pulmonary embolism without acute cor pulmonale ([facility]) 2023     Depression 2023     History of LVH (left ventricular hypertrophy) 2023     Pure hypertriglyceridemia 2023     Unilateral vestibular schwannoma (HCC) 2023     Port-A-Cath in place 2023     Diplopia 2023     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HCC) 2023     Umbilical hernia without obstruction and without gangrene 12/10/2022     Tobacco use 12/10/2022     Retroperitoneal lymphadenopathy 12/10/2022       LOS (days): 26  Geometric Mean LOS (GMLOS) (days):   Days to GMLOS:     OBJECTIVE:  Risk of Unplanned Readmission Score: 46.92         Current admission status: Inpatient   Preferred Pharmacy:   [facility]/pharmacy #[id number] - [place] [address]  [facility] [address]  Phone: [phone] Fax: [phone]    Primary Care Provider: Ela Douglas MD    Primary Insurance: [employer]  Secondary Insurance:     DISCHARGE DETAILS:    Discharge planning discussed with:: Pt's sisterDmitry  [place] of Choice: Yes  Comments - [place] of Choice: Return home with [facility]    Contacts  Patient Contacts: Dmitry Swanson (sister)  Relationship to Patient:: Family  Contact Method: Phone  Phone Number: [phone]  Reason/Outcome: Discharge Planning, Continuity of Care, Emergency Contact, Referral    Other Referral/Resources/Interventions Provided:  Interventions: DME  Referral Comments: CM notified by Dr. Alas that plan is to have pt remian on the 6-0 shiley distal XLT. CM notified Hilario with [facility]. RX provided and completed by Dr. Alas. CM sent back to Hilario. As per Hilario he will have order placed and to have pt return home today with 6 extra inner cannulas and an extra trach. CM notified nursing. As per Dr. Alas pt remains stable for dc today. CM spoke with Dmitry who is aware of above. Aware supplies will be sent home until she is able to recieve the shipment. Dmitry plans to have family at bedside at 3:20 pm to ride in transport with the pt to manage his vent. She is aware of the 4 pm transport.    Treatment Team Recommendation: Home  Discharge Destination Plan:: Home  Transport at Discharge : [facility] (with family)     Number/Name of Dispatcher: Round Trip [phone]  Transported by (Company and Unit #): [id number] [phone]  ETA of Transport (Date): 10/31/24  ETA of Transport (Time): 1600 (CC, RN, and pt's sister - Dmitry)

## 2024-11-01 LAB
ALB CSF/SERPL: 3 {RATIO} (ref 0–8)
ALBUMIN CSF-MCNC: 11 MG/DL (ref 10–45)
ALBUMIN SERPL-MCNC: 3.5 G/DL (ref 4.1–5.1)
DME PARACHUTE DELIVERY DATE ACTUAL: NORMAL
DME PARACHUTE DELIVERY DATE REQUESTED: NORMAL
DME PARACHUTE DELIVERY NOTE: NORMAL
DME PARACHUTE ITEM DESCRIPTION: NORMAL
DME PARACHUTE ORDER STATUS: NORMAL
DME PARACHUTE SUPPLIER NAME: NORMAL
DME PARACHUTE SUPPLIER PHONE: NORMAL
IGG CSF-MCNC: 2.3 MG/DL (ref 0–10.3)
IGG SERPL-MCNC: 1026 MG/DL (ref 603–1613)
IGG SYNTH RATE SER+CSF CALC-MRATE: 0.3 MG/DAY
IGG/ALB CLEAR SER+CSF-RTO: 0.7 (ref 0–0.7)
IGG/ALB CSF: 0.21 {RATIO} (ref 0–0.25)
MBP CSF-MCNC: 18.2 NG/ML (ref 0–3.8)
OLIGOCLONAL BANDS.IT SER+CSF QL: ABNORMAL

## 2024-11-04 ENCOUNTER — TRANSITIONAL CARE MANAGEMENT (OUTPATIENT)
Dept: FAMILY MEDICINE CLINIC | Facility: CLINIC | Age: 39
End: 2024-11-04

## 2024-11-08 LAB
AMPAR1 AB SER QL IF: NEGATIVE
AMPAR2 AB SER QL IF: NEGATIVE
AMPHIPHYSIN AB SER QL: NEGATIVE
AQP4 H2O CHANNEL AB SERPL IA-ACNC: NEGATIVE
CASPR2 IGG SERPL QL IF: NEGATIVE
CV2 IGG SER-ACNC: NEGATIVE
DPPX IGG SERPL QL IF: NEGATIVE
GABABR AB SER QL IF: NEGATIVE
GAD65 IGG+IGM SER IA-SCNC: NEGATIVE NMOL/L
GLIAL NUC TYPE 1 AB SER QL IF: NEGATIVE
HU AB SER QL IF: NEGATIVE
HU2 AB SER QL IF: NEGATIVE
HU3 AB SER QL: NEGATIVE
IGLON5 IGG SER QL IF: NEGATIVE
INTERPRETATION: NEGATIVE
IP3R 1 IGG SER QL IF: NEGATIVE
LGI1 IGG SERPL QL IF: NEGATIVE
MA+TA AB SER QL: NEGATIVE
MGLUR1 IGG SER QL IF: NEGATIVE
NMDAR1 AB SER QL IF: NEGATIVE
PCA-1 AB SER QL IF: NEGATIVE
PCA-2 AB SER QL IF: NEGATIVE
PCA-TR AB SER QL IF: NEGATIVE
PCA-TR AB SER QL IF: NEGATIVE
VGCC AB SER-SCNC: <1 PMOL/L (ref 0–30)
ZIC4 AB SER QL: NEGATIVE

## 2024-11-12 ENCOUNTER — TELEMEDICINE (OUTPATIENT)
Dept: UROLOGY | Facility: CLINIC | Age: 39
End: 2024-11-12
Payer: COMMERCIAL

## 2024-11-12 DIAGNOSIS — C62.92 SEMINOMA OF LEFT TESTIS, STAGE 2 (HCC): Primary | ICD-10-CM

## 2024-11-12 PROCEDURE — 99213 OFFICE O/P EST LOW 20 MIN: CPT

## 2024-11-12 NOTE — PROGRESS NOTES
Virtual Regular Visit  Name: Hemanth Swanson      : 1985      MRN: [id number]  Encounter Provider: AMBER Saini  Encounter Date: 2024   Encounter department: [facility] UROLOGY [place]    Verification of patient location:    Patient is located at Home in the following state in which I hold an active license [place]    Assessment & Plan  Seminoma of left testis, stage 2 (HCC)    I have ordered hormone labs and tumor marker labs that patient did not complete from previous visit.  Niece states they will get the labs. We will plan to see him virtually in 6 months.         Encounter provider AMBER Saini    The patient was identified by name and date of birth. Hemanth Swanson was informed that this is a telemedicine visit and that the visit is being conducted through the Epic Embedded platform. He agrees to proceed..  My office door was closed. No one else was in the room.  He acknowledged consent and understanding of privacy and security of the video platform. The patient has agreed to participate and understands they can discontinue the visit at any time.    Patient is aware this is a billable service.     History of Present Illness     Hemanth Swanson is a 39 y.o. male who presents with hx of stage II left testicular seminoma s/p orchiectomy. He had an orchiectomy in 2022. He is ventilator dependent. He was previously on testosterone cypionate 100mg twice a week. His niece states that it helped his hot flashes. He was hospitalized in October after pulling out his feeding tube. He stopped using testosterone, niece is unsure why. He had a chest abd pelvis w/contrast (10/11/24) which showed moderate bibasilar pulmonary opacification consistent with atelectasis; superimposed pneumonia not entirely excludable.Trace left greater than right pleural effusions. No acute abnormality identified in the abdomen or pelvis.  I have ordered hormone labs and tumor marker labs that patient  did not complete from previous visit.  Niece states they will get the labs. We will plan to see him virtually in 6 months.     History obtained from : Patient's Niece, Molly  Review of Systems  Pertinent Medical History   Ventilator dependent, feeding tube in wheelchair. Has hx of stage II left testicular seminoma s/p orchiectomy         Medical History Reviewed by provider this encounter:  Tobacco  Allergies  Meds  Problems  Med Hx  Surg Hx  Fam Hx       Past Medical History   Past Medical History:   Diagnosis Date    Anxiety     Cancer (HCC)     Depression     Hypernatremia 10/06/2024    Migration of percutaneous endoscopic gastrostomy (PEG) tube (HCC) 09/24/2023    Psychiatric disorder     bipolar     Past Surgical History:   Procedure Laterality Date    IR BIOPSY LYMPH NODE  01/20/2023    IR GASTROSTOMY (G) TUBE CHECK/CHANGE/REINSERTION/UPSIZE  6/18/2024    IR GASTROSTOMY TUBE PLACEMENT  09/25/2023    IR LUMBAR PUNCTURE  09/06/2023    IR LUMBAR PUNCTURE  10/25/2024    IR PORT PLACEMENT  03/14/2023    ORCHIECTOMY Left 12/14/2022    Procedure: ORCHIECTOMY INGUINAL;  Surgeon: Flip Michael MD;  Location:  MAIN OR;  Service: Urology    PEG W/TRACHEOSTOMY PLACEMENT N/A 09/08/2023    Procedure: TRACHEOSTOMY WITH INSERTION PEG TUBE;  Surgeon: Rudy Mcmanus MD;  Location: [facility] MAIN OR;  Service: General    TESTICLE SURGERY       Family History   Problem Relation Age of Onset    Coronary artery disease Maternal Aunt     Cancer Father     Diabetes Father     Hypertension Father      Current Outpatient Medications on File Prior to Visit   Medication Sig Dispense Refill    acetylcysteine (MUCOMYST) 200 mg/mL nebulizer solution Take 3 mL (600 mg total) by nebulization every 6 (six) hours 360 mL 0    apixaban (ELIQUIS) 5 mg 1 tablet (5 mg total) by Per PEG Tube route 2 (two) times a day 60 tablet 2    fluconazole (DIFLUCAN) 40 mg/mL suspension Take 5 mL (200 mg total) by mouth once a week for 3 doses 15 mL 0  "   gabapentin (NEURONTIN) 250 mg/5 mL solution Take 2 mL (100 mg total) by mouth every 8 (eight) hours for 2 days, THEN 2 mL (100 mg total) every 12 (twelve) hours for 2 days, THEN 2 mL (100 mg total) daily at bedtime for 2 days. 24 mL 0    Incontinence Supply Disposable (Comfort Shield Adult Diapers) MISC Use 1 each 4 (four) times a day 48 each 5    levalbuterol (XOPENEX) 1.25 mg/3 mL nebulizer solution Take 3 mL (1.25 mg total) by nebulization every 6 (six) hours 360 mL 0    miconazole 2 % cream Apply topically 2 (two) times a day Apply until resolution, see your PCP for follow up 60 g 1    NEEDLE, DISP, 18 G 18G X 1\" MISC Use 2 (two) times a week 50 each 3    Oral Hygiene Products (Toothette Swabs/Dentifrice) SWAB Apply to the mouth or throat 3 (three) times a day 1000 each 1    oxygen gas Inhale 6 L/min as needed      sodium chloride (BRONCHO SALINE) inhaler solution Take 1 spray by nebulization every 8 (eight) hours      Syringe/Needle, Disp, (Syringe Luer Slip) 25G X 5/8\" 1 ML MISC Use 2 (two) times a week 50 each 3    testosterone cypionate (DEPO-TESTOSTERONE) 200 mg/mL SOLN Inject 0.5 mL (100 mg total) into a muscle 2 (two) times a week 10 mL 3     No current facility-administered medications on file prior to visit.     Allergies   Allergen Reactions    Dog Epithelium Cough, Sneezing and Nasal Congestion      Current Outpatient Medications on File Prior to Visit   Medication Sig Dispense Refill    acetylcysteine (MUCOMYST) 200 mg/mL nebulizer solution Take 3 mL (600 mg total) by nebulization every 6 (six) hours 360 mL 0    apixaban (ELIQUIS) 5 mg 1 tablet (5 mg total) by Per PEG Tube route 2 (two) times a day 60 tablet 2    fluconazole (DIFLUCAN) 40 mg/mL suspension Take 5 mL (200 mg total) by mouth once a week for 3 doses 15 mL 0    gabapentin (NEURONTIN) 250 mg/5 mL solution Take 2 mL (100 mg total) by mouth every 8 (eight) hours for 2 days, THEN 2 mL (100 mg total) every 12 (twelve) hours for 2 days, THEN " "2 mL (100 mg total) daily at bedtime for 2 days. 24 mL 0    Incontinence Supply Disposable (Comfort Shield Adult Diapers) MISC Use 1 each 4 (four) times a day 48 each 5    levalbuterol (XOPENEX) 1.25 mg/3 mL nebulizer solution Take 3 mL (1.25 mg total) by nebulization every 6 (six) hours 360 mL 0    miconazole 2 % cream Apply topically 2 (two) times a day Apply until resolution, see your PCP for follow up 60 g 1    NEEDLE, DISP, 18 G 18G X 1\" MISC Use 2 (two) times a week 50 each 3    Oral Hygiene Products (Toothette Swabs/Dentifrice) SWAB Apply to the mouth or throat 3 (three) times a day 1000 each 1    oxygen gas Inhale 6 L/min as needed      sodium chloride (BRONCHO SALINE) inhaler solution Take 1 spray by nebulization every 8 (eight) hours      Syringe/Needle, Disp, (Syringe Luer Slip) 25G X 5/8\" 1 ML MISC Use 2 (two) times a week 50 each 3    testosterone cypionate (DEPO-TESTOSTERONE) 200 mg/mL SOLN Inject 0.5 mL (100 mg total) into a muscle 2 (two) times a week 10 mL 3     No current facility-administered medications on file prior to visit.      Social History     Tobacco Use    Smoking status: Former     Current packs/day: 0.00     Average packs/day: 0.7 packs/day for 22.7 years (15.0 ttl pk-yrs)     Types: Cigarettes     Start date: 2008     Quit date: 2023     Years since quittin.4    Smokeless tobacco: Never   Vaping Use    Vaping status: Former    Start date: 2018    Quit date: 2023    Substances: Nicotine, THC   Substance and Sexual Activity    Alcohol use: Not Currently     Comment: 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 Not currently using crack cocaine.    Sexual activity: Not Currently     Partners: Female     Birth control/protection: Other         Objective     There were no vitals taken for this visit.  Physical Exam    Visit Time  Total Visit Duration: 20        "

## 2024-11-18 PROBLEM — A41.9 SEPSIS (HCC): Status: RESOLVED | Noted: 2023-08-24 | Resolved: 2024-11-18

## 2024-11-19 ENCOUNTER — TELEPHONE (OUTPATIENT)
Age: 39
End: 2024-11-19

## 2024-11-19 NOTE — TELEPHONE ENCOUNTER
Pt sister calling to switch apt 11/26 to virtual with Dr. Barksdale    What Type Of Note Output Would You Prefer (Optional)?: Bullet Format How Severe Are Your Spot(S)?: mild altered mental status Have Your Spot(S) Been Treated In The Past?: has been treated Hpi Title: Evaluation of Skin Lesions

## 2024-11-26 ENCOUNTER — TELEMEDICINE (OUTPATIENT)
Dept: PULMONOLOGY | Facility: CLINIC | Age: 39
End: 2024-11-26
Payer: COMMERCIAL

## 2024-11-26 DIAGNOSIS — G70.9 NEUROMUSCULAR DISORDER (HCC): ICD-10-CM

## 2024-11-26 DIAGNOSIS — I26.99 OTHER ACUTE PULMONARY EMBOLISM WITHOUT ACUTE COR PULMONALE (HCC): ICD-10-CM

## 2024-11-26 DIAGNOSIS — Z99.11 VENTILATOR DEPENDENT (HCC): Primary | ICD-10-CM

## 2024-11-26 DIAGNOSIS — Z93.0 STATUS POST TRACHEOSTOMY (HCC): ICD-10-CM

## 2024-11-26 PROCEDURE — 99214 OFFICE O/P EST MOD 30 MIN: CPT | Performed by: STUDENT IN AN ORGANIZED HEALTH CARE EDUCATION/TRAINING PROGRAM

## 2024-11-26 NOTE — PATIENT INSTRUCTIONS
It was a pleasure seeing you today!    Follow-up in 3 months    Yessy Barksdale MD  Pulmonary and Critical Care Medicine

## 2024-11-26 NOTE — PROGRESS NOTES
Virtual Regular Visit  Name: Hemanth Swanson      : 1985      MRN: [id number]  Encounter Provider: Yessy Barksdale MD  Encounter Date: 2024   Encounter department: [facility] PULMONARY [facility]    Verification of patient location:    Patient is located at Home in the following state in which I hold an active license [place]    Assessment & Plan       Encounter provider Yessy Barksdale MD    The patient was identified by name and date of birth. Hemanth Swanson was informed that this is a telemedicine visit and that the visit is being conducted through the Epic Embedded platform. He agrees to proceed..  My office door was closed. Other methods to assure confidentiality were taken such as ear phones. No one else was in the room.  He acknowledged consent and understanding of privacy and security of the video platform. The patient has agreed to participate and understands they can discontinue the visit at any time.    Patient is aware this is a billable service.     History of Present Illness     Hemanth Swanson is a 39 y.o. male who presents     Hemanth is on for a video visit. He was most recently hospitalized where I took care of him along with several other intensivist.  He was admitted to [facility] where he had pneumonia with diffuse secretions requiring numerous bronchoscopies, numerous vasovagal syncope episodes that led to cardiac arrest.  Cardiology was involved but felt no device was indicated.  Eventually he slightly improved and his sister who is his primary caretaker felt that this is the way he overuses therefore he was discharged home.      Currently while at home he is full vent support, he picks his head off the bed by 2 inches, lifts his hands up slightly but his elbows do not appear to get off the bed, his saturation was 95% and his heart rate was 87 on the monitor nearby.  Still requires full vent support.    His tracheostomy is currently a 6.  During hospitalization they tried using an  8.0 but that did not work out.    He is currently without vest therapy, has a CoughAssist device.  I will prescribe a vest again.  He also has Mucomyst device along with Eliquis due to the immobility and history of pulmonary embolisms, and has sodium chloride solution.  He follows with urology    Plan  Unsure if he was ever discharged on a prednisone taper for his transverse myelitis  Unsure if he was discharged on treatment for tinea  Refer to complex care management as this is too much for his sister Dmitry  Refer to home PT and OT  Order placed for vest therapy  Continue full vent support for now        From my ICU attestation    39-year-old gentleman admitted to medical ICU due to respiratory failure, complex past medical history includes ventilator dependent respiratory failure due to an unknown neurologic condition, follows with me due to ventilator dependence, history of seminoma status post orchiectomy and chemotherapy that may have led to his neurologic condition, status post trach, PEG, PE on indefinite anticoagulation.     During his hospitalization he was admitted initially due to respiratory failure found to have left-sided consolidation along with hypernatremia.  Bronchoscopy with Pseudomonas, Proteus treated with cefepime and Zosyn.  He has had several bronchoscopies during his hospitalization, still continues to have worsening white out on imaging.     Neurology consulted for possible seizure-like activity but they feel this is not seizures.  Cardiology consulted due to vagal arrhythmias/bradycardia, no further management.     Bronchoscopy this week showed significant mucous plugs.  BAL showing Pseudomonas, will not treat as ventilator acquired pneumonia as this is now colonization at this point. MRI cervical showed transverse myelitis.  LP with significantly elevated opening pressure. On IVIG and steroids         Assessment:  Transverse myelitis on MRI C spine, LP with no infection so far, treated  with IVIG and Solu-Medrol 1 g, unsure if he was ever discharged on a prednisone taper  Tinea infection on his back, status post biopsy  Ventilator dependent respiratory failure with 6 cuffed distal XLT Shiley, complicated by recurrent pneumonias with Pseudomonas and Proteus treated with 2-week regimen of Zosyn along with cefepime prior to that.  As needed bronchoscopy, vest therapy. 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, cisplatin  Male hypogonadism secondary to treatment follows with urology, levels checked no replacement needed at this time  History of cardiac arrest secondary to vagal maneuver  Chronically incarcerated umbilical hernia     Plan:  Neuro: No sedatives  CV: Continue Eliquis   Resp: vest therapy, HOB > 30 degrees, continue with albuterol, Mucomyst,  GI: Tube feeds at goal  FEN: Replace electrolytes as needed  : No [place]  Heme: Eliquis lifelong  ID: Hold antimicrobials, BAL is likely conization  Endo: Accu-Cheks PRN  MSK: Chair position bed  Lines: Peripheral, trach      Review of Systems   Constitutional:  Positive for activity change and appetite change.   HENT: Negative.     Eyes: Negative.    Respiratory:  Positive for cough and shortness of breath.    Cardiovascular: Negative.    Gastrointestinal: Negative.    Endocrine: Negative.    Genitourinary: Negative.    Musculoskeletal:  Positive for gait problem.   Skin: Negative.    Allergic/Immunologic: Negative.    Neurological:  Positive for seizures and speech difficulty.   Hematological: Negative.    Psychiatric/Behavioral:  Negative.       Pertinent Medical History         Objective     There were no vitals taken for this visit.  Physical Exam  HENT:      Head: Normocephalic.      Nose: Nose normal.   Cardiovascular:      Rate and Rhythm: Normal rate.   Musculoskeletal:         General: Normal range of motion.      Cervical back: Normal range of motion.   Neurological:      General: No focal deficit present.      Mental Status: He is alert. Mental status is at baseline.         Visit Time  Total Visit Duration: 32 minutes

## 2024-11-27 ENCOUNTER — PATIENT OUTREACH (OUTPATIENT)
Dept: CASE MANAGEMENT | Facility: OTHER | Age: 39
End: 2024-11-27

## 2024-11-27 NOTE — PROGRESS NOTES
In basket message received with a referral from patient's pulmonologist. Chart reviewed.  Patient was hospitalized at [facility] 10/5-10/31 with toxic metabolic encephalopathy, chronic respiratory failure with hypoxia, sepsis transverse myelitis and is ventilator dependent.  He discharged home with [facility] home health care.    I spoke with Dmitry patient's sister and caregiver. I explained I received a referral from Hemanth's pulmonary doctor. She stated she has not heard from physical or occupational therapy and could I follow up. She declined going over Hemanth's medications as she has been his caregiver for years.  I will call [facility].

## 2024-11-27 NOTE — PROGRESS NOTES
I called the intake department at [facility] and left a message on their voicemail with my contact information requesting a return call.

## 2024-12-02 ENCOUNTER — PATIENT OUTREACH (OUTPATIENT)
Dept: CASE MANAGEMENT | Facility: OTHER | Age: 39
End: 2024-12-02

## 2024-12-02 NOTE — PROGRESS NOTES
Second attempt to speak with [facility]. I left a message on their voicemail requesting a return call along with my contact information.  Previous message asking for a return call was not returned.

## 2024-12-10 ENCOUNTER — APPOINTMENT (EMERGENCY)
Dept: RADIOLOGY | Facility: HOSPITAL | Age: 39
End: 2024-12-10
Payer: COMMERCIAL

## 2024-12-10 ENCOUNTER — HOSPITAL ENCOUNTER (EMERGENCY)
Facility: HOSPITAL | Age: 39
Discharge: HOME | End: 2024-12-10
Attending: EMERGENCY MEDICINE
Payer: COMMERCIAL

## 2024-12-10 VITALS
SYSTOLIC BLOOD PRESSURE: 106 MMHG | TEMPERATURE: 97.7 F | HEART RATE: 105 BPM | DIASTOLIC BLOOD PRESSURE: 47 MMHG | OXYGEN SATURATION: 95 % | RESPIRATION RATE: 18 BRPM

## 2024-12-10 DIAGNOSIS — T85.528A DISLODGED GASTROSTOMY TUBE: Primary | ICD-10-CM

## 2024-12-10 PROCEDURE — 74018 RADEX ABDOMEN 1 VIEW: CPT

## 2024-12-10 PROCEDURE — 99283 EMERGENCY DEPT VISIT LOW MDM: CPT

## 2024-12-10 PROCEDURE — 99284 EMERGENCY DEPT VISIT MOD MDM: CPT | Performed by: EMERGENCY MEDICINE

## 2024-12-10 PROCEDURE — 43762 RPLC GTUBE NO REVJ TRC: CPT | Performed by: EMERGENCY MEDICINE

## 2024-12-10 NOTE — ED PROVIDER NOTES
ED Disposition       None          Assessment & Plan       Medical Decision Making  39-year-old male presenting for PEG tube replacement.  Patient is chronic ill and vent dependent.  Patient comes from home and family indicating that he is at his baseline.  Prior records indicate that his PEG was recently changed to a 16 Citizen of [place].        ED Course as of 12/10/24 1336   Tue Dec 10, 2024   1253 Patient tolerated PEG tube replacement well and postprocedure x-ray shows Omnipaque in the stomach       Medications - No data to display    ED Risk Strat Scores                                               History of Present Illness       Chief Complaint   Patient presents with   • Feeding Tube Problem     Patient removed feeding tube       Past Medical History:   Diagnosis Date   • Anxiety    • Cancer (HCC)    • Depression    • Hypernatremia 10/06/2024   • Migration of percutaneous endoscopic gastrostomy (PEG) tube (HCC) 09/24/2023   • Psychiatric disorder     bipolar      Past Surgical History:   Procedure Laterality Date   • IR BIOPSY LYMPH NODE  01/20/2023   • IR GASTROSTOMY (G) TUBE CHECK/CHANGE/REINSERTION/UPSIZE  6/18/2024   • IR GASTROSTOMY TUBE PLACEMENT  09/25/2023   • IR LUMBAR PUNCTURE  09/06/2023   • IR LUMBAR PUNCTURE  10/25/2024   • IR PORT PLACEMENT  03/14/2023   • ORCHIECTOMY Left 12/14/2022    Procedure: ORCHIECTOMY INGUINAL;  Surgeon: Flip Michael MD;  Location:  MAIN OR;  Service: Urology   • PEG W/TRACHEOSTOMY PLACEMENT N/A 09/08/2023    Procedure: TRACHEOSTOMY WITH INSERTION PEG TUBE;  Surgeon: Rudy Mcmanus MD;  Location: [facility];  Service: General   • TESTICLE SURGERY        Family History   Problem Relation Age of Onset   • Coronary artery disease Maternal Aunt    • Cancer Father    • Diabetes Father    • Hypertension Father       Social History     Tobacco Use   • Smoking status: Former     Current packs/day: 0.00     Average packs/day: 0.7 packs/day for 22.7 years (15.0 ttl pk-yrs)     " Types: Cigarettes     Start date: 2008     Quit date: 2023     Years since quittin.5   • Smokeless tobacco: Never   Vaping Use   • Vaping status: Former   • Start date: 2018   • Quit date: 2023   • Substances: Nicotine, THC   Substance Use Topics   • Alcohol use: Not Currently     Comment: Former alcoholic. Last use in 2016   • Drug use: Not Currently     Types: \"Crack\" cocaine, Marijuana     Comment: Current use medical marijuana. Not currently using crack cocaine.      E-Cigarette/Vaping   • E-Cigarette Use Former User    • Start Date 20    • Quit Date 25       E-Cigarette/Vaping Substances   • Nicotine Yes    • THC Yes    • CBD No    • Flavoring No    • Other No    • Unknown No       I have reviewed and agree with the history as documented.     39-year-old male presenting for PEG tube replacement.  Patient is chronically vent dependent and lives at home.  He is known to the emergency department for respiratory issues and pulling out his PEG.  Family at bedside states that he pulled his PEG out accidentally earlier today.  Patient appears chronically unwell but family at bedside states this is normal appearance.        Review of Systems   Unable to perform ROS: Patient nonverbal           Objective       ED Triage Vitals   Temp Pulse BP Resp SpO2 Patient Position - Orthostatic VS   -- -- -- -- -- --      Temp src Heart Rate Source BP Location FiO2 (%) Pain Score    -- -- -- -- --      Vitals    None         Physical Exam  Constitutional:       General: He is not in acute distress.  HENT:      Nose: No congestion.      Mouth/Throat:      Mouth: Mucous membranes are moist.   Neck:      Comments: Trach site clean and intact  Cardiovascular:      Heart sounds: Normal heart sounds.   Pulmonary:      Effort: No respiratory distress.   Abdominal:      General: Abdomen is flat.      Comments: PEG site clean and intact   Musculoskeletal:      Cervical back: Neck supple.      Comments: " "contractures   Skin:     General: Skin is warm.   Neurological:      Mental Status: Mental status is at baseline.         Results Reviewed       None            No orders to display       Feeding Tube    Date/Time: 12/10/2024 12:44 PM    Performed by: Magdiel Mcclure DO  Authorized by: Magdiel Mcclure DO  Universal Protocol:  procedure performed by consultantConsent: Verbal consent obtained.  Risks and benefits: risks, benefits and alternatives were discussed  Consent given by: Family.  Time out: Immediately prior to procedure a \"time out\" was called to verify the correct patient, procedure, equipment, support staff and site/side marked as required.  Timeout called at: 12/10/2024 12:44 PM.  Patient understanding: patient states understanding of the procedure being performed (Family does)  Patient consent: the patient's understanding of the procedure matches consent given (Family does)  Procedure consent: procedure consent matches procedure scheduled  Relevant documents: relevant documents present and verified  Test results: test results available and properly labeled  Site marked: the operative site was marked  Patient identity confirmed: arm band    Patient location:  ED  Pre-procedure details:     Old tube type:  Gastrostomy    Old tube size:  16 Fr  Indications:     Indications: tube removed by patient    Anesthesia (see MAR for exact dosages):     Anesthesia method:  None  Procedure details:     Patient position:  Supine    Procedure type:  Replacement    Tube type:  Gastrostomy    Tube size:  16 Fr    Bulb inflation volume:  6 mL    Bulb inflation fluid:  Sterile water  Post-procedure details:     Placement/position confirmation:  X-ray    Placement difficulty:  Minimal    Bleeding:  Minimal    Patient tolerance of procedure:  Tolerated well, no immediate complications      ED Medication and Procedure Management   Cannot display prior to admission medications because the patient has not been admitted in this " contact.     Patient's Medications   Discharge Prescriptions    No medications on file     No discharge procedures on file.  ED SEPSIS DOCUMENTATION            Magdiel Mcclure,   12/10/24 4998

## 2024-12-11 ENCOUNTER — PATIENT OUTREACH (OUTPATIENT)
Dept: CASE MANAGEMENT | Facility: OTHER | Age: 39
End: 2024-12-11

## 2024-12-11 ENCOUNTER — TELEPHONE (OUTPATIENT)
Dept: FAMILY MEDICINE CLINIC | Facility: CLINIC | Age: 39
End: 2024-12-11

## 2024-12-11 NOTE — PROGRESS NOTES
I spoke with Dmitry who reports she did receive a call from [facility].  Dmitry reports Hemanth was ordered a percussion vest and she has not heard anything from [facility]. I advised her I will follow up with [facility].

## 2024-12-11 NOTE — PROGRESS NOTES
I spoke with a staff member at [facility]. I was advised they called Hemanth multiple times with no reply to their voicemail's.  I asked them to call Dmitry, his caregiver, at [phone].

## 2024-12-12 ENCOUNTER — PATIENT OUTREACH (OUTPATIENT)
Dept: CASE MANAGEMENT | Facility: OTHER | Age: 39
End: 2024-12-12

## 2024-12-12 ENCOUNTER — TELEMEDICINE (OUTPATIENT)
Dept: FAMILY MEDICINE CLINIC | Facility: CLINIC | Age: 39
End: 2024-12-12
Payer: COMMERCIAL

## 2024-12-12 VITALS — OXYGEN SATURATION: 98 %

## 2024-12-12 DIAGNOSIS — R82.90 BAD ODOR OF URINE: Primary | ICD-10-CM

## 2024-12-12 PROCEDURE — 99214 OFFICE O/P EST MOD 30 MIN: CPT | Performed by: FAMILY MEDICINE

## 2024-12-12 NOTE — PROGRESS NOTES
I called Yola and advised her to return the percussion vest to [facility] then [employer] can put the order through. Dmitry stated she did receive a call from [employer] and does understand she needs to return the vest which she will do.

## 2024-12-12 NOTE — PROGRESS NOTES
Virtual Regular Visit  Name: Hemanth Swanson      : 1985      MRN: [id number]  Encounter Provider: Ela Douglas MD  Encounter Date: 2024   Encounter department: [facility]      Verification of patient location:  Patient is located at Home in the following state in which I hold an active license [place] :  Assessment & Plan  Bad odor of urine  Check UA and culture, treat if culture is positive.    Offered mobile lab/vna vs family helping patient collect.    Pt prefers to have family help and communicates that he can tell them when he needs to urinate and will go in cup.    Sister will  supplies from lab and return the sample to the lab  If she has difficulty she will let me know.    No fevers, no new systemic symptoms    Orders:  •  Urinalysis with microscopic; Future  •  Urine culture; Future        Encounter provider Ela Douglas MD    The patient was identified by name and date of birth. Hemanth Swanson was informed that this is a telemedicine visit and that the visit is being conducted through the Epic Embedded platform. He agrees to proceed..  My office door was closed. No one else was in the room.  He acknowledged consent and understanding of privacy and security of the video platform. The patient has agreed to participate and understands they can discontinue the visit at any time.    Patient is aware this is a billable service.     History of Present Illness     HPI  Pt's sister Dmitry is with patient  Since the hospital stay last month she notes that he has had dark urine which is strong, foul smelling.  No acute changes.    Did have GI virus that was going through household recently- had one episode vomiting, 3 loose stools, but then improved.     Not on tube feeds presently b/c he is tolerating PO better and prefers this.  He is taking fluids PO now and pureed foods. No coughing/choking.  O2 sats have been fine.    Changes wet diaper about four times per  day.    Having normal Bms 1-2 x per day.    No fever  No SOB  No change mental status    Pt is able to tell family when he needs to urinate- used to be able to use urinal but now in diapers b/c of loss of arm function.         Review of Systems    Objective   SpO2 98%     Physical Exam  Vitals and nursing note reviewed.   Constitutional:       Comments: On vent, able to wave at camera to say hello.  Some drooling.  Appears comfortable         Visit Time  Total Visit Duration: 18 (face to face plus documentation)

## 2024-12-12 NOTE — TELEPHONE ENCOUNTER
Pt is scheduled for OV for possible UTI on Thursday.  Pt is not ambulatory and is nonverbal- it is difficult to bring him in for appts.    He was just in the ER yesterday for pulled peg tube.  Ok to check w/ his sister (Dmitry) to see if she'd prefer virtual visit to discuss and can order urine studies.  Can order mobile lab if needed.

## 2024-12-15 ENCOUNTER — HOSPITAL ENCOUNTER (EMERGENCY)
Facility: HOSPITAL | Age: 39
End: 2024-12-16
Attending: EMERGENCY MEDICINE
Payer: COMMERCIAL

## 2024-12-15 ENCOUNTER — APPOINTMENT (EMERGENCY)
Dept: RADIOLOGY | Facility: HOSPITAL | Age: 39
End: 2024-12-15
Payer: COMMERCIAL

## 2024-12-15 DIAGNOSIS — G92.8 TOXIC METABOLIC ENCEPHALOPATHY: ICD-10-CM

## 2024-12-15 DIAGNOSIS — E87.0 HYPERNATREMIA: ICD-10-CM

## 2024-12-15 DIAGNOSIS — J18.9 PNEUMONIA: ICD-10-CM

## 2024-12-15 DIAGNOSIS — Z93.0 TRACHEOSTOMY DEPENDENT (HCC): ICD-10-CM

## 2024-12-15 DIAGNOSIS — A41.9 SEPSIS (HCC): Primary | ICD-10-CM

## 2024-12-15 DIAGNOSIS — R45.1 AGITATION: ICD-10-CM

## 2024-12-15 LAB
BASOPHILS # BLD AUTO: 0.06 THOUSANDS/ÂΜL (ref 0–0.1)
BASOPHILS NFR BLD AUTO: 0 % (ref 0–1)
EOSINOPHIL # BLD AUTO: 0.02 THOUSAND/ÂΜL (ref 0–0.61)
EOSINOPHIL NFR BLD AUTO: 0 % (ref 0–6)
ERYTHROCYTE [DISTWIDTH] IN BLOOD BY AUTOMATED COUNT: 14.7 % (ref 11.6–15.1)
HCT VFR BLD AUTO: 47.6 % (ref 36.5–49.3)
HGB BLD-MCNC: 14 G/DL (ref 12–17)
IMM GRANULOCYTES # BLD AUTO: 0.09 THOUSAND/UL (ref 0–0.2)
IMM GRANULOCYTES NFR BLD AUTO: 1 % (ref 0–2)
LYMPHOCYTES # BLD AUTO: 1.7 THOUSANDS/ÂΜL (ref 0.6–4.47)
LYMPHOCYTES NFR BLD AUTO: 12 % (ref 14–44)
MCH RBC QN AUTO: 30.3 PG (ref 26.8–34.3)
MCHC RBC AUTO-ENTMCNC: 29.4 G/DL (ref 31.4–37.4)
MCV RBC AUTO: 103 FL (ref 82–98)
MONOCYTES # BLD AUTO: 1.13 THOUSAND/ÂΜL (ref 0.17–1.22)
MONOCYTES NFR BLD AUTO: 8 % (ref 4–12)
NEUTROPHILS # BLD AUTO: 11.84 THOUSANDS/ÂΜL (ref 1.85–7.62)
NEUTS SEG NFR BLD AUTO: 79 % (ref 43–75)
NRBC BLD AUTO-RTO: 0 /100 WBCS
PLATELET # BLD AUTO: 281 THOUSANDS/UL (ref 149–390)
PMV BLD AUTO: 10.5 FL (ref 8.9–12.7)
RBC # BLD AUTO: 4.62 MILLION/UL (ref 3.88–5.62)
WBC # BLD AUTO: 14.84 THOUSAND/UL (ref 4.31–10.16)

## 2024-12-15 PROCEDURE — 87040 BLOOD CULTURE FOR BACTERIA: CPT | Performed by: EMERGENCY MEDICINE

## 2024-12-15 PROCEDURE — 99285 EMERGENCY DEPT VISIT HI MDM: CPT

## 2024-12-15 PROCEDURE — 83735 ASSAY OF MAGNESIUM: CPT | Performed by: EMERGENCY MEDICINE

## 2024-12-15 PROCEDURE — 87811 SARS-COV-2 COVID19 W/OPTIC: CPT | Performed by: EMERGENCY MEDICINE

## 2024-12-15 PROCEDURE — 85610 PROTHROMBIN TIME: CPT | Performed by: EMERGENCY MEDICINE

## 2024-12-15 PROCEDURE — 85730 THROMBOPLASTIN TIME PARTIAL: CPT | Performed by: EMERGENCY MEDICINE

## 2024-12-15 PROCEDURE — 83605 ASSAY OF LACTIC ACID: CPT | Performed by: EMERGENCY MEDICINE

## 2024-12-15 PROCEDURE — 36415 COLL VENOUS BLD VENIPUNCTURE: CPT | Performed by: EMERGENCY MEDICINE

## 2024-12-15 PROCEDURE — 85025 COMPLETE CBC W/AUTO DIFF WBC: CPT | Performed by: EMERGENCY MEDICINE

## 2024-12-15 PROCEDURE — 93005 ELECTROCARDIOGRAM TRACING: CPT

## 2024-12-15 PROCEDURE — 87804 INFLUENZA ASSAY W/OPTIC: CPT | Performed by: EMERGENCY MEDICINE

## 2024-12-15 PROCEDURE — 71045 X-RAY EXAM CHEST 1 VIEW: CPT

## 2024-12-15 PROCEDURE — 82805 BLOOD GASES W/O2 SATURATION: CPT | Performed by: EMERGENCY MEDICINE

## 2024-12-15 PROCEDURE — 84145 PROCALCITONIN (PCT): CPT | Performed by: EMERGENCY MEDICINE

## 2024-12-15 PROCEDURE — 80053 COMPREHEN METABOLIC PANEL: CPT | Performed by: EMERGENCY MEDICINE

## 2024-12-15 PROCEDURE — 83690 ASSAY OF LIPASE: CPT | Performed by: EMERGENCY MEDICINE

## 2024-12-15 PROCEDURE — 84484 ASSAY OF TROPONIN QUANT: CPT | Performed by: EMERGENCY MEDICINE

## 2024-12-15 RX ORDER — MIDAZOLAM HYDROCHLORIDE 2 MG/2ML
8 INJECTION, SOLUTION INTRAMUSCULAR; INTRAVENOUS ONCE
Status: COMPLETED | OUTPATIENT
Start: 2024-12-15 | End: 2024-12-15

## 2024-12-15 RX ADMIN — MIDAZOLAM 8 MG: 1 INJECTION INTRAMUSCULAR; INTRAVENOUS at 23:37

## 2024-12-16 ENCOUNTER — APPOINTMENT (EMERGENCY)
Dept: CT IMAGING | Facility: HOSPITAL | Age: 39
End: 2024-12-16
Payer: COMMERCIAL

## 2024-12-16 ENCOUNTER — PATIENT OUTREACH (OUTPATIENT)
Dept: CASE MANAGEMENT | Facility: OTHER | Age: 39
End: 2024-12-16

## 2024-12-16 ENCOUNTER — HOSPITAL ENCOUNTER (INPATIENT)
Facility: HOSPITAL | Age: 39
LOS: 11 days | Discharge: HOME HEALTH | DRG: 137 | End: 2024-12-27
Attending: ANESTHESIOLOGY | Admitting: STUDENT IN AN ORGANIZED HEALTH CARE EDUCATION/TRAINING PROGRAM
Payer: COMMERCIAL

## 2024-12-16 VITALS
WEIGHT: 214.73 LBS | HEART RATE: 69 BPM | TEMPERATURE: 97.2 F | DIASTOLIC BLOOD PRESSURE: 60 MMHG | SYSTOLIC BLOOD PRESSURE: 120 MMHG | RESPIRATION RATE: 16 BRPM | BODY MASS INDEX: 26.14 KG/M2 | OXYGEN SATURATION: 97 %

## 2024-12-16 DIAGNOSIS — R45.1 AGITATION: ICD-10-CM

## 2024-12-16 DIAGNOSIS — G92.8 TOXIC METABOLIC ENCEPHALOPATHY: ICD-10-CM

## 2024-12-16 DIAGNOSIS — J96.21 ACUTE ON CHRONIC RESPIRATORY FAILURE WITH HYPOXIA AND HYPERCAPNIA (HCC): Primary | ICD-10-CM

## 2024-12-16 DIAGNOSIS — Z99.11 VENTILATOR DEPENDENT (HCC): ICD-10-CM

## 2024-12-16 DIAGNOSIS — J96.22 ACUTE ON CHRONIC RESPIRATORY FAILURE WITH HYPOXIA AND HYPERCAPNIA (HCC): Primary | ICD-10-CM

## 2024-12-16 DIAGNOSIS — Z93.1 S/P PERCUTANEOUS ENDOSCOPIC GASTROSTOMY (PEG) TUBE PLACEMENT (HCC): Chronic | ICD-10-CM

## 2024-12-16 DIAGNOSIS — E87.0 HYPERNATREMIA: ICD-10-CM

## 2024-12-16 DIAGNOSIS — G62.89 MIXED AXONAL-DEMYELINATING POLYNEUROPATHY: ICD-10-CM

## 2024-12-16 PROBLEM — R31.9 HEMATURIA: Status: ACTIVE | Noted: 2024-12-16

## 2024-12-16 PROBLEM — Z86.711 HISTORY OF PULMONARY EMBOLUS (PE): Status: ACTIVE | Noted: 2024-12-16

## 2024-12-16 LAB
2HR DELTA HS TROPONIN: <-1 NG/L
ALBUMIN SERPL BCG-MCNC: 3.7 G/DL (ref 3.5–5)
ALBUMIN SERPL BCG-MCNC: 4.3 G/DL (ref 3.5–5)
ALP SERPL-CCNC: 115 U/L (ref 34–104)
ALP SERPL-CCNC: 95 U/L (ref 34–104)
ALT SERPL W P-5'-P-CCNC: 24 U/L (ref 7–52)
ALT SERPL W P-5'-P-CCNC: 33 U/L (ref 7–52)
AMPHETAMINES SERPL QL SCN: NEGATIVE
ANION GAP SERPL CALCULATED.3IONS-SCNC: 10 MMOL/L (ref 4–13)
ANION GAP SERPL CALCULATED.3IONS-SCNC: 8 MMOL/L (ref 4–13)
ANION GAP SERPL CALCULATED.3IONS-SCNC: 9 MMOL/L (ref 4–13)
APTT PPP: 28 SECONDS (ref 23–34)
AST SERPL W P-5'-P-CCNC: 17 U/L (ref 13–39)
AST SERPL W P-5'-P-CCNC: 18 U/L (ref 13–39)
ATRIAL RATE: 103 BPM
BACTERIA UR QL AUTO: ABNORMAL /HPF
BACTERIA UR QL AUTO: ABNORMAL /HPF
BARBITURATES UR QL: NEGATIVE
BASE EX.OXY STD BLDV CALC-SCNC: 88.8 % (ref 60–80)
BASE EXCESS BLDV CALC-SCNC: -0.1 MMOL/L
BASOPHILS # BLD AUTO: 0.05 THOUSANDS/ÂΜL (ref 0–0.1)
BASOPHILS NFR BLD AUTO: 0 % (ref 0–1)
BENZODIAZ UR QL: POSITIVE
BILIRUB SERPL-MCNC: 0.38 MG/DL (ref 0.2–1)
BILIRUB SERPL-MCNC: 0.48 MG/DL (ref 0.2–1)
BILIRUB UR QL STRIP: NEGATIVE
BILIRUB UR QL STRIP: NEGATIVE
BUN SERPL-MCNC: 21 MG/DL (ref 5–25)
BUN SERPL-MCNC: 23 MG/DL (ref 5–25)
BUN SERPL-MCNC: 27 MG/DL (ref 5–25)
CA-I BLD-SCNC: 1.14 MMOL/L (ref 1.12–1.32)
CALCIUM SERPL-MCNC: 10.5 MG/DL (ref 8.4–10.2)
CALCIUM SERPL-MCNC: 8.8 MG/DL (ref 8.4–10.2)
CALCIUM SERPL-MCNC: 9.2 MG/DL (ref 8.4–10.2)
CARDIAC TROPONIN I PNL SERPL HS: 3 NG/L (ref ?–50)
CARDIAC TROPONIN I PNL SERPL HS: <2 NG/L (ref ?–50)
CHLORIDE SERPL-SCNC: 116 MMOL/L (ref 96–108)
CHLORIDE SERPL-SCNC: 117 MMOL/L (ref 96–108)
CHLORIDE SERPL-SCNC: 117 MMOL/L (ref 96–108)
CLARITY UR: CLEAR
CLARITY UR: CLEAR
CO2 SERPL-SCNC: 29 MMOL/L (ref 21–32)
CO2 SERPL-SCNC: 29 MMOL/L (ref 21–32)
CO2 SERPL-SCNC: 36 MMOL/L (ref 21–32)
COCAINE UR QL: NEGATIVE
COLOR UR: YELLOW
COLOR UR: YELLOW
CREAT SERPL-MCNC: 0.65 MG/DL (ref 0.6–1.3)
CREAT SERPL-MCNC: 0.72 MG/DL (ref 0.6–1.3)
CREAT SERPL-MCNC: 0.87 MG/DL (ref 0.6–1.3)
CREAT UR-MCNC: 138 MG/DL
EOSINOPHIL # BLD AUTO: 0.03 THOUSAND/ÂΜL (ref 0–0.61)
EOSINOPHIL NFR BLD AUTO: 0 % (ref 0–6)
ERYTHROCYTE [DISTWIDTH] IN BLOOD BY AUTOMATED COUNT: 14.8 % (ref 11.6–15.1)
ETHANOL SERPL-MCNC: <10 MG/DL
FENTANYL UR QL SCN: NEGATIVE
FLUAV AG UPPER RESP QL IA.RAPID: NEGATIVE
FLUBV AG UPPER RESP QL IA.RAPID: NEGATIVE
GFR SERPL CREATININE-BSD FRML MDRD: 110 ML/MIN/1.73SQ M
GFR SERPL CREATININE-BSD FRML MDRD: 119 ML/MIN/1.73SQ M
GFR SERPL CREATININE-BSD FRML MDRD: 124 ML/MIN/1.73SQ M
GLUCOSE SERPL-MCNC: 108 MG/DL (ref 65–140)
GLUCOSE SERPL-MCNC: 75 MG/DL (ref 65–140)
GLUCOSE SERPL-MCNC: 83 MG/DL (ref 65–140)
GLUCOSE SERPL-MCNC: 90 MG/DL (ref 65–140)
GLUCOSE UR STRIP-MCNC: NEGATIVE MG/DL
GLUCOSE UR STRIP-MCNC: NEGATIVE MG/DL
HCO3 BLDV-SCNC: 26.8 MMOL/L (ref 24–30)
HCT VFR BLD AUTO: 37.3 % (ref 36.5–49.3)
HGB BLD-MCNC: 11.3 G/DL (ref 12–17)
HGB UR QL STRIP.AUTO: ABNORMAL
HGB UR QL STRIP.AUTO: ABNORMAL
HYDROCODONE UR QL SCN: NEGATIVE
IMM GRANULOCYTES # BLD AUTO: 0.09 THOUSAND/UL (ref 0–0.2)
IMM GRANULOCYTES NFR BLD AUTO: 1 % (ref 0–2)
INR PPP: 1.19 (ref 0.85–1.19)
KETONES UR STRIP-MCNC: NEGATIVE MG/DL
KETONES UR STRIP-MCNC: NEGATIVE MG/DL
L PNEUMO1 AG UR QL IA.RAPID: NEGATIVE
LACTATE SERPL-SCNC: 1 MMOL/L (ref 0.5–2)
LACTATE SERPL-SCNC: 1.2 MMOL/L (ref 0.5–2)
LACTATE SERPL-SCNC: 3.1 MMOL/L (ref 0.5–2)
LEUKOCYTE ESTERASE UR QL STRIP: NEGATIVE
LEUKOCYTE ESTERASE UR QL STRIP: NEGATIVE
LIPASE SERPL-CCNC: 30 U/L (ref 11–82)
LYMPHOCYTES # BLD AUTO: 1.38 THOUSANDS/ÂΜL (ref 0.6–4.47)
LYMPHOCYTES NFR BLD AUTO: 10 % (ref 14–44)
MAGNESIUM SERPL-MCNC: 2.6 MG/DL (ref 1.9–2.7)
MAGNESIUM SERPL-MCNC: 2.7 MG/DL (ref 1.9–2.7)
MCH RBC QN AUTO: 30.3 PG (ref 26.8–34.3)
MCHC RBC AUTO-ENTMCNC: 30.3 G/DL (ref 31.4–37.4)
MCV RBC AUTO: 100 FL (ref 82–98)
METHADONE UR QL: NEGATIVE
MONOCYTES # BLD AUTO: 0.95 THOUSAND/ÂΜL (ref 0.17–1.22)
MONOCYTES NFR BLD AUTO: 7 % (ref 4–12)
MUCOUS THREADS UR QL AUTO: ABNORMAL
MUCOUS THREADS UR QL AUTO: ABNORMAL
NEUTROPHILS # BLD AUTO: 10.73 THOUSANDS/ÂΜL (ref 1.85–7.62)
NEUTS SEG NFR BLD AUTO: 82 % (ref 43–75)
NITRITE UR QL STRIP: NEGATIVE
NITRITE UR QL STRIP: NEGATIVE
NON-SQ EPI CELLS URNS QL MICRO: ABNORMAL /HPF
NON-SQ EPI CELLS URNS QL MICRO: ABNORMAL /HPF
NRBC BLD AUTO-RTO: 0 /100 WBCS
O2 CT BLDV-SCNC: 16.4 ML/DL
OPIATES UR QL SCN: NEGATIVE
OSMOLALITY UR/SERPL-RTO: 338 MMOL/KG (ref 282–298)
OSMOLALITY UR: 732 MMOL/KG (ref 250–900)
OXYCODONE+OXYMORPHONE UR QL SCN: NEGATIVE
P AXIS: 33 DEGREES
PCO2 BLDV: 53.5 MM HG (ref 42–50)
PCP UR QL: NEGATIVE
PH BLDV: 7.32 [PH] (ref 7.3–7.4)
PH UR STRIP.AUTO: 5.5 [PH]
PH UR STRIP.AUTO: 6 [PH]
PHOSPHATE SERPL-MCNC: 3.2 MG/DL (ref 2.7–4.5)
PLATELET # BLD AUTO: 236 THOUSANDS/UL (ref 149–390)
PMV BLD AUTO: 10.5 FL (ref 8.9–12.7)
PO2 BLDV: 58.8 MM HG (ref 35–45)
POTASSIUM SERPL-SCNC: 2.8 MMOL/L (ref 3.5–5.3)
POTASSIUM SERPL-SCNC: 3.9 MMOL/L (ref 3.5–5.3)
POTASSIUM SERPL-SCNC: 4.3 MMOL/L (ref 3.5–5.3)
PR INTERVAL: 146 MS
PROCALCITONIN SERPL-MCNC: 0.07 NG/ML
PROCALCITONIN SERPL-MCNC: 0.11 NG/ML
PROT SERPL-MCNC: 7.3 G/DL (ref 6.4–8.4)
PROT SERPL-MCNC: 9 G/DL (ref 6.4–8.4)
PROT UR STRIP-MCNC: ABNORMAL MG/DL
PROT UR STRIP-MCNC: ABNORMAL MG/DL
PROTHROMBIN TIME: 15.5 SECONDS (ref 12.3–15)
QRS AXIS: 81 DEGREES
QRSD INTERVAL: 86 MS
QT INTERVAL: 340 MS
QTC INTERVAL: 445 MS
RBC # BLD AUTO: 3.73 MILLION/UL (ref 3.88–5.62)
RBC #/AREA URNS AUTO: ABNORMAL /HPF
RBC #/AREA URNS AUTO: ABNORMAL /HPF
S PNEUM AG UR QL: NEGATIVE
SARS-COV+SARS-COV-2 AG RESP QL IA.RAPID: NEGATIVE
SODIUM 24H UR-SCNC: 114 MOL/L
SODIUM SERPL-SCNC: 154 MMOL/L (ref 135–147)
SODIUM SERPL-SCNC: 155 MMOL/L (ref 135–147)
SODIUM SERPL-SCNC: 162 MMOL/L (ref 135–147)
SP GR UR STRIP.AUTO: 1.02 (ref 1–1.03)
SP GR UR STRIP.AUTO: >=1.03 (ref 1–1.03)
T WAVE AXIS: 7 DEGREES
THC UR QL: POSITIVE
TSH SERPL DL<=0.05 MIU/L-ACNC: 2.09 UIU/ML (ref 0.45–4.5)
URATE CRY URNS QL MICRO: ABNORMAL /HPF
UROBILINOGEN UR QL STRIP.AUTO: 0.2 E.U./DL
UROBILINOGEN UR STRIP-ACNC: <2 MG/DL
VENTRICULAR RATE: 103 BPM
WBC # BLD AUTO: 13.23 THOUSAND/UL (ref 4.31–10.16)
WBC #/AREA URNS AUTO: ABNORMAL /HPF
WBC #/AREA URNS AUTO: ABNORMAL /HPF

## 2024-12-16 PROCEDURE — 71260 CT THORAX DX C+: CPT

## 2024-12-16 PROCEDURE — 96361 HYDRATE IV INFUSION ADD-ON: CPT

## 2024-12-16 PROCEDURE — 94640 AIRWAY INHALATION TREATMENT: CPT

## 2024-12-16 PROCEDURE — 83605 ASSAY OF LACTIC ACID: CPT

## 2024-12-16 PROCEDURE — 74177 CT ABD & PELVIS W/CONTRAST: CPT

## 2024-12-16 PROCEDURE — 84145 PROCALCITONIN (PCT): CPT

## 2024-12-16 PROCEDURE — 94003 VENT MGMT INPAT SUBQ DAY: CPT

## 2024-12-16 PROCEDURE — 36415 COLL VENOUS BLD VENIPUNCTURE: CPT | Performed by: EMERGENCY MEDICINE

## 2024-12-16 PROCEDURE — 87154 CUL TYP ID BLD PTHGN 6+ TRGT: CPT

## 2024-12-16 PROCEDURE — 93010 ELECTROCARDIOGRAM REPORT: CPT | Performed by: INTERNAL MEDICINE

## 2024-12-16 PROCEDURE — 96366 THER/PROPH/DIAG IV INF ADDON: CPT

## 2024-12-16 PROCEDURE — NC001 PR NO CHARGE: Performed by: STUDENT IN AN ORGANIZED HEALTH CARE EDUCATION/TRAINING PROGRAM

## 2024-12-16 PROCEDURE — 83605 ASSAY OF LACTIC ACID: CPT | Performed by: EMERGENCY MEDICINE

## 2024-12-16 PROCEDURE — 85025 COMPLETE CBC W/AUTO DIFF WBC: CPT

## 2024-12-16 PROCEDURE — 84300 ASSAY OF URINE SODIUM: CPT | Performed by: EMERGENCY MEDICINE

## 2024-12-16 PROCEDURE — 80307 DRUG TEST PRSMV CHEM ANLYZR: CPT

## 2024-12-16 PROCEDURE — 94669 MECHANICAL CHEST WALL OSCILL: CPT

## 2024-12-16 PROCEDURE — 80048 BASIC METABOLIC PNL TOTAL CA: CPT

## 2024-12-16 PROCEDURE — 70450 CT HEAD/BRAIN W/O DYE: CPT

## 2024-12-16 PROCEDURE — 84100 ASSAY OF PHOSPHORUS: CPT

## 2024-12-16 PROCEDURE — 83930 ASSAY OF BLOOD OSMOLALITY: CPT | Performed by: EMERGENCY MEDICINE

## 2024-12-16 PROCEDURE — 87081 CULTURE SCREEN ONLY: CPT | Performed by: STUDENT IN AN ORGANIZED HEALTH CARE EDUCATION/TRAINING PROGRAM

## 2024-12-16 PROCEDURE — 87077 CULTURE AEROBIC IDENTIFY: CPT

## 2024-12-16 PROCEDURE — 94664 DEMO&/EVAL PT USE INHALER: CPT

## 2024-12-16 PROCEDURE — 87186 SC STD MICRODIL/AGAR DIL: CPT

## 2024-12-16 PROCEDURE — 99291 CRITICAL CARE FIRST HOUR: CPT | Performed by: EMERGENCY MEDICINE

## 2024-12-16 PROCEDURE — 81001 URINALYSIS AUTO W/SCOPE: CPT

## 2024-12-16 PROCEDURE — 87040 BLOOD CULTURE FOR BACTERIA: CPT

## 2024-12-16 PROCEDURE — 94668 MNPJ CHEST WALL SBSQ: CPT

## 2024-12-16 PROCEDURE — 81001 URINALYSIS AUTO W/SCOPE: CPT | Performed by: EMERGENCY MEDICINE

## 2024-12-16 PROCEDURE — 96365 THER/PROPH/DIAG IV INF INIT: CPT

## 2024-12-16 PROCEDURE — 83935 ASSAY OF URINE OSMOLALITY: CPT | Performed by: EMERGENCY MEDICINE

## 2024-12-16 PROCEDURE — 94760 N-INVAS EAR/PLS OXIMETRY 1: CPT

## 2024-12-16 PROCEDURE — 84484 ASSAY OF TROPONIN QUANT: CPT | Performed by: EMERGENCY MEDICINE

## 2024-12-16 PROCEDURE — 82330 ASSAY OF CALCIUM: CPT

## 2024-12-16 PROCEDURE — 96360 HYDRATION IV INFUSION INIT: CPT

## 2024-12-16 PROCEDURE — 82570 ASSAY OF URINE CREATININE: CPT | Performed by: EMERGENCY MEDICINE

## 2024-12-16 PROCEDURE — 94002 VENT MGMT INPAT INIT DAY: CPT

## 2024-12-16 PROCEDURE — 87449 NOS EACH ORGANISM AG IA: CPT

## 2024-12-16 PROCEDURE — 82077 ASSAY SPEC XCP UR&BREATH IA: CPT

## 2024-12-16 PROCEDURE — 94150 VITAL CAPACITY TEST: CPT

## 2024-12-16 PROCEDURE — 87205 SMEAR GRAM STAIN: CPT

## 2024-12-16 PROCEDURE — 93005 ELECTROCARDIOGRAM TRACING: CPT

## 2024-12-16 PROCEDURE — 83735 ASSAY OF MAGNESIUM: CPT

## 2024-12-16 PROCEDURE — 99291 CRITICAL CARE FIRST HOUR: CPT | Performed by: STUDENT IN AN ORGANIZED HEALTH CARE EDUCATION/TRAINING PROGRAM

## 2024-12-16 PROCEDURE — 80053 COMPREHEN METABOLIC PANEL: CPT

## 2024-12-16 PROCEDURE — 84443 ASSAY THYROID STIM HORMONE: CPT

## 2024-12-16 PROCEDURE — 96368 THER/DIAG CONCURRENT INF: CPT

## 2024-12-16 PROCEDURE — 82948 REAGENT STRIP/BLOOD GLUCOSE: CPT

## 2024-12-16 PROCEDURE — 87070 CULTURE OTHR SPECIMN AEROBIC: CPT

## 2024-12-16 RX ORDER — SODIUM CHLORIDE, SODIUM GLUCONATE, SODIUM ACETATE, POTASSIUM CHLORIDE, MAGNESIUM CHLORIDE, SODIUM PHOSPHATE, DIBASIC, AND POTASSIUM PHOSPHATE .53; .5; .37; .037; .03; .012; .00082 G/100ML; G/100ML; G/100ML; G/100ML; G/100ML; G/100ML; G/100ML
500 INJECTION, SOLUTION INTRAVENOUS ONCE
Status: COMPLETED | OUTPATIENT
Start: 2024-12-16 | End: 2024-12-16

## 2024-12-16 RX ORDER — IPRATROPIUM BROMIDE AND ALBUTEROL SULFATE 2.5; .5 MG/3ML; MG/3ML
3 SOLUTION RESPIRATORY (INHALATION)
Status: DISCONTINUED | OUTPATIENT
Start: 2024-12-16 | End: 2024-12-16

## 2024-12-16 RX ORDER — DEXTROSE MONOHYDRATE 50 MG/ML
100 INJECTION, SOLUTION INTRAVENOUS CONTINUOUS
Status: DISCONTINUED | OUTPATIENT
Start: 2024-12-16 | End: 2024-12-16 | Stop reason: HOSPADM

## 2024-12-16 RX ORDER — SODIUM CHLORIDE, SODIUM GLUCONATE, SODIUM ACETATE, POTASSIUM CHLORIDE, MAGNESIUM CHLORIDE, SODIUM PHOSPHATE, DIBASIC, AND POTASSIUM PHOSPHATE .53; .5; .37; .037; .03; .012; .00082 G/100ML; G/100ML; G/100ML; G/100ML; G/100ML; G/100ML; G/100ML
1000 INJECTION, SOLUTION INTRAVENOUS ONCE
Status: COMPLETED | OUTPATIENT
Start: 2024-12-16 | End: 2024-12-16

## 2024-12-16 RX ORDER — MIDAZOLAM HYDROCHLORIDE 2 MG/2ML
2 INJECTION, SOLUTION INTRAMUSCULAR; INTRAVENOUS ONCE
Status: COMPLETED | OUTPATIENT
Start: 2024-12-16 | End: 2024-12-16

## 2024-12-16 RX ORDER — ACETYLCYSTEINE 200 MG/ML
3 SOLUTION ORAL; RESPIRATORY (INHALATION)
Status: DISCONTINUED | OUTPATIENT
Start: 2024-12-16 | End: 2024-12-16

## 2024-12-16 RX ORDER — MIDAZOLAM HYDROCHLORIDE 2 MG/2ML
INJECTION, SOLUTION INTRAMUSCULAR; INTRAVENOUS
Status: COMPLETED
Start: 2024-12-16 | End: 2024-12-16

## 2024-12-16 RX ORDER — CEFEPIME HYDROCHLORIDE 2 G/50ML
2000 INJECTION, SOLUTION INTRAVENOUS ONCE
Status: COMPLETED | OUTPATIENT
Start: 2024-12-16 | End: 2024-12-16

## 2024-12-16 RX ORDER — VANCOMYCIN HYDROCHLORIDE 1 G/20ML
INJECTION, POWDER, LYOPHILIZED, FOR SOLUTION INTRAVENOUS
Status: COMPLETED
Start: 2024-12-16 | End: 2024-12-16

## 2024-12-16 RX ORDER — MIDAZOLAM HYDROCHLORIDE 2 MG/2ML
4 INJECTION, SOLUTION INTRAMUSCULAR; INTRAVENOUS ONCE
Status: DISCONTINUED | OUTPATIENT
Start: 2024-12-16 | End: 2024-12-16

## 2024-12-16 RX ORDER — MIDAZOLAM HYDROCHLORIDE 2 MG/2ML
8 INJECTION, SOLUTION INTRAMUSCULAR; INTRAVENOUS ONCE
Status: DISCONTINUED | OUTPATIENT
Start: 2024-12-16 | End: 2024-12-16

## 2024-12-16 RX ORDER — ACETYLCYSTEINE 200 MG/ML
3 SOLUTION ORAL; RESPIRATORY (INHALATION)
Status: DISCONTINUED | OUTPATIENT
Start: 2024-12-16 | End: 2024-12-18

## 2024-12-16 RX ORDER — WATER 10 ML/10ML
INJECTION INTRAMUSCULAR; INTRAVENOUS; SUBCUTANEOUS
Status: COMPLETED
Start: 2024-12-16 | End: 2024-12-16

## 2024-12-16 RX ORDER — DEXTROSE MONOHYDRATE 50 MG/ML
100 INJECTION, SOLUTION INTRAVENOUS CONTINUOUS
Status: DISCONTINUED | OUTPATIENT
Start: 2024-12-16 | End: 2024-12-16

## 2024-12-16 RX ORDER — POTASSIUM CHLORIDE 14.9 MG/ML
20 INJECTION INTRAVENOUS ONCE
Status: COMPLETED | OUTPATIENT
Start: 2024-12-16 | End: 2024-12-16

## 2024-12-16 RX ORDER — POTASSIUM CHLORIDE 20MEQ/15ML
40 LIQUID (ML) ORAL ONCE
Status: COMPLETED | OUTPATIENT
Start: 2024-12-16 | End: 2024-12-16

## 2024-12-16 RX ORDER — ALBUTEROL SULFATE 0.83 MG/ML
2.5 SOLUTION RESPIRATORY (INHALATION)
Status: DISCONTINUED | OUTPATIENT
Start: 2024-12-16 | End: 2024-12-18

## 2024-12-16 RX ORDER — CHLORHEXIDINE GLUCONATE ORAL RINSE 1.2 MG/ML
15 SOLUTION DENTAL EVERY 12 HOURS SCHEDULED
Status: DISCONTINUED | OUTPATIENT
Start: 2024-12-16 | End: 2024-12-27 | Stop reason: HOSPADM

## 2024-12-16 RX ORDER — DEXTROSE MONOHYDRATE 50 MG/ML
50 INJECTION, SOLUTION INTRAVENOUS CONTINUOUS
Status: DISCONTINUED | OUTPATIENT
Start: 2024-12-16 | End: 2024-12-17

## 2024-12-16 RX ORDER — SODIUM CHLORIDE FOR INHALATION 3 %
4 VIAL, NEBULIZER (ML) INHALATION
Status: DISCONTINUED | OUTPATIENT
Start: 2024-12-16 | End: 2024-12-16

## 2024-12-16 RX ORDER — LORAZEPAM 2 MG/ML
2 INJECTION INTRAMUSCULAR EVERY 4 HOURS PRN
Status: DISCONTINUED | OUTPATIENT
Start: 2024-12-16 | End: 2024-12-18

## 2024-12-16 RX ADMIN — VANCOMYCIN HYDROCHLORIDE 2000 MG: 1 INJECTION, POWDER, LYOPHILIZED, FOR SOLUTION INTRAVENOUS at 01:05

## 2024-12-16 RX ADMIN — POTASSIUM CHLORIDE 40 MEQ: 20 SOLUTION ORAL at 17:47

## 2024-12-16 RX ADMIN — ACETYLCYSTEINE 600 MG: 200 SOLUTION ORAL; RESPIRATORY (INHALATION) at 09:02

## 2024-12-16 RX ADMIN — ALBUTEROL SULFATE 2.5 MG: 2.5 SOLUTION RESPIRATORY (INHALATION) at 23:08

## 2024-12-16 RX ADMIN — CEFEPIME HYDROCHLORIDE 2000 MG: 2 INJECTION, SOLUTION INTRAVENOUS at 00:28

## 2024-12-16 RX ADMIN — POTASSIUM CHLORIDE 20 MEQ: 14.9 INJECTION, SOLUTION INTRAVENOUS at 17:47

## 2024-12-16 RX ADMIN — LORAZEPAM 2 MG: 2 INJECTION INTRAMUSCULAR; INTRAVENOUS at 11:07

## 2024-12-16 RX ADMIN — DEXTROSE 100 ML/HR: 5 SOLUTION INTRAVENOUS at 03:34

## 2024-12-16 RX ADMIN — SODIUM CHLORIDE, SODIUM GLUCONATE, SODIUM ACETATE, POTASSIUM CHLORIDE, MAGNESIUM CHLORIDE, SODIUM PHOSPHATE, DIBASIC, AND POTASSIUM PHOSPHATE 500 ML: .53; .5; .37; .037; .03; .012; .00082 INJECTION, SOLUTION INTRAVENOUS at 14:33

## 2024-12-16 RX ADMIN — APIXABAN 5 MG: 5 TABLET, FILM COATED ORAL at 09:13

## 2024-12-16 RX ADMIN — CHLORHEXIDINE GLUCONATE 15 ML: 1.2 RINSE ORAL at 09:13

## 2024-12-16 RX ADMIN — ACETYLCYSTEINE 600 MG: 200 SOLUTION ORAL; RESPIRATORY (INHALATION) at 23:08

## 2024-12-16 RX ADMIN — CHLORHEXIDINE GLUCONATE 15 ML: 1.2 RINSE ORAL at 20:17

## 2024-12-16 RX ADMIN — VANCOMYCIN HYDROCHLORIDE 2000 MG: 1 INJECTION, POWDER, LYOPHILIZED, FOR SOLUTION INTRAVENOUS at 01:06

## 2024-12-16 RX ADMIN — MIDAZOLAM 2 MG: 1 INJECTION INTRAMUSCULAR; INTRAVENOUS at 06:59

## 2024-12-16 RX ADMIN — LORAZEPAM 2 MG: 2 INJECTION INTRAMUSCULAR; INTRAVENOUS at 22:20

## 2024-12-16 RX ADMIN — IOHEXOL 100 ML: 350 INJECTION, SOLUTION INTRAVENOUS at 01:55

## 2024-12-16 RX ADMIN — MIDAZOLAM 2 MG: 1 INJECTION INTRAMUSCULAR; INTRAVENOUS at 01:28

## 2024-12-16 RX ADMIN — MIDAZOLAM HYDROCHLORIDE 2 MG: 2 INJECTION, SOLUTION INTRAMUSCULAR; INTRAVENOUS at 08:00

## 2024-12-16 RX ADMIN — PIPERACILLIN AND TAZOBACTAM 4.5 G: 4; .5 INJECTION, POWDER, FOR SOLUTION INTRAVENOUS at 09:14

## 2024-12-16 RX ADMIN — WATER: 1 INJECTION INTRAMUSCULAR; INTRAVENOUS; SUBCUTANEOUS at 01:06

## 2024-12-16 RX ADMIN — ALBUTEROL SULFATE 2.5 MG: 2.5 SOLUTION RESPIRATORY (INHALATION) at 11:15

## 2024-12-16 RX ADMIN — ALBUTEROL SULFATE 2.5 MG: 2.5 SOLUTION RESPIRATORY (INHALATION) at 15:41

## 2024-12-16 RX ADMIN — SODIUM CHLORIDE, SODIUM GLUCONATE, SODIUM ACETATE, POTASSIUM CHLORIDE, MAGNESIUM CHLORIDE, SODIUM PHOSPHATE, DIBASIC, AND POTASSIUM PHOSPHATE 1000 ML: .53; .5; .37; .037; .03; .012; .00082 INJECTION, SOLUTION INTRAVENOUS at 00:28

## 2024-12-16 RX ADMIN — DEXTROSE 100 ML/HR: 5 SOLUTION INTRAVENOUS at 09:13

## 2024-12-16 RX ADMIN — MIDAZOLAM 2 MG: 1 INJECTION INTRAMUSCULAR; INTRAVENOUS at 08:00

## 2024-12-16 RX ADMIN — APIXABAN 5 MG: 5 TABLET, FILM COATED ORAL at 17:26

## 2024-12-16 RX ADMIN — IPRATROPIUM BROMIDE AND ALBUTEROL SULFATE 3 ML: .5; 3 SOLUTION RESPIRATORY (INHALATION) at 09:02

## 2024-12-16 RX ADMIN — SODIUM CHLORIDE, SODIUM GLUCONATE, SODIUM ACETATE, POTASSIUM CHLORIDE, MAGNESIUM CHLORIDE, SODIUM PHOSPHATE, DIBASIC, AND POTASSIUM PHOSPHATE 500 ML: .53; .5; .37; .037; .03; .012; .00082 INJECTION, SOLUTION INTRAVENOUS at 19:45

## 2024-12-16 RX ADMIN — LORAZEPAM 2 MG: 2 INJECTION INTRAMUSCULAR; INTRAVENOUS at 15:50

## 2024-12-16 RX ADMIN — POTASSIUM CHLORIDE 20 MEQ: 14.9 INJECTION, SOLUTION INTRAVENOUS at 20:17

## 2024-12-16 RX ADMIN — ACETYLCYSTEINE 600 MG: 200 SOLUTION ORAL; RESPIRATORY (INHALATION) at 11:16

## 2024-12-16 RX ADMIN — DEXTROSE 50 ML/HR: 5 SOLUTION INTRAVENOUS at 17:47

## 2024-12-16 RX ADMIN — ACETYLCYSTEINE 600 MG: 200 SOLUTION ORAL; RESPIRATORY (INHALATION) at 15:41

## 2024-12-16 NOTE — ASSESSMENT & PLAN NOTE
S/p trach for NM disease   Trach 6XL  History of significant mucous plugs  CT chest revealing Complete collapse of the left lower lobe and severe atelectasis of the right lower lobe similar to prior examination. Superimposed aspiration or pneumonia cannot be excluded. Trace left pleural effusion.     Plan:  Agressive pulmonary hygiene   Mucomyst, albuterol  Chest therapy  Frequent suctioning/turning  Monitor off antibiotics  Attempt trach collar pending mentation

## 2024-12-16 NOTE — ED NOTES
Transfer Information:  Pickup with MICKEY @ 06:45  [place] [facility]  Call report: [phone]     Ankush Hernández RN  12/16/24 0540

## 2024-12-16 NOTE — ED PROVIDER NOTES
Time reflects when diagnosis was documented in both MDM as applicable and the Disposition within this note       Time User Action Codes Description Comment    12/16/2024  2:26 AM David Gay [A41.9] Sepsis (HCC)     12/16/2024  2:26 AM David Gay [G92.8] Toxic metabolic encephalopathy     12/16/2024  2:27 AM David Gay [R45.1] Agitation     12/16/2024  2:27 AM David Gay [E87.0] Hypernatremia     12/16/2024  2:27 AM David Gay [Z93.0] Tracheostomy dependent (HCC)     12/16/2024  3:44 AM David Gay [J18.9] Pneumonia           ED Disposition       ED Disposition   Transfer to Another Facility-In Network    Condition   --    Date/Time   Mon Dec 16, 2024  4:13 AM    Comment   Hemanth Swanson should be transferred out to [facility], ICU, Dr. Cardoza accepts.               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 History is limited as provided by the patient due to his baseline nonverbal status as well as his current acuity.    Vital signs reviewed.  See physical exam documentation for exam findings.  On arrival the patient is acutely ill-appearing, high suspicion for sepsis as well as pneumonia and toxic metabolic encephalopathy, given that the patient has presented similarly in the past.  The patient on arrival is agitated, and pulling at leads and resisting attempts at IV placement, thus requiring sedation with Versed 8 mg IM (appropriate weight-based dose).  Differential diagnosis includes but is not limited to metabolic encephalopathy, pneumonia, sepsis, UTI, pyelonephritis,  bowel obstruction, anemia, and/or electrolyte disturbance.  Labs and imaging ordered.  See ED course for independent interpretation of results.  Leukocytosis 14.84 as well as lactic acidosis 3.1.  Meeting sepsis criteria, sepsis alert called.  IV fluids and antibiotics ordered.  Imaging reveals evidence of pneumonia as well as a 2 mm calculus in the distal right ureter just prior to the UVJ.  UA however does not appear consistent with a bacterial UTI and I suspect that the source of infection is the patient's pneumonia, causing his septic presentation and toxic metabolic encephalopathy.  Patient has hypernatremia, which he has also had in the past.  Discussed with nephrology on-call, Dr. Kerns, regarding the patient's hypernatremia and recommendations for IV fluids.  She is recommending starting at D5W 100 mL/hr. Request placed through PACS to discussed with critical care for transfer.  Discussed with Dr. Cardoza, ICU at [facility], who accepts the patient for transfer, stepdown 1 level of care. EMTALA completed and signed.    Amount and/or Complexity of Data Reviewed  Labs: ordered. Decision-making details documented in ED Course.  Radiology: ordered. Decision-making details documented in ED Course.    Risk  Prescription drug management.        ED Course as of 12/16/24 0724   Sun Dec 15, 2024   2353 Procedure Note: EKG  Date/Time: 12/15/24 11:50 PM   Interpreted by: David Gay MD  Indications / Diagnosis: Agitation  ECG reviewed by me, the ED Physician: yes   The EKG demonstrates:  Rhythm: sinus tachycardia 103 bpm  Intervals: normal intervals  Axis: normal axis  QRS/Blocks: normal QRS  ST Changes: No acute ST-T wave abnormality.  No STEMI.  Otherwise normal ECG with the exception of sinus tachycardia.   Mon Dec 16, 2024   0017 CBC and differential(!)  Leukocytosis 14.84.  Normal hemoglobin, hematocrit, and platelet count.   0017 LACTIC ACID(!): 3.1  Elevated.  Will order IV fluids and empiric antibiotics.  Sepsis alert called.   0018 Blood gas, Venous(!)  Normal pH 7.318.  Base deficit 0.1.  pCO2 53.5, pO2 58.8.   0018 Procalcitonin: 0.07  Normal   0018 FLU/COVID Rapid Antigen (30 min. TAT) - Preferred screening test in ED  All negative   0018 hs TnI 0hr: 3  Normal   0018 PROTIME(!): 15.5   0018 POCT INR: 1.19   0018 PTT: 28   0130 Patient taken to CT scan, however initial dose of Versed 8 mg IM that was given on arrival to facilitate IV placement and treatment appears to have worn off, as the patient is more awake and again beginning to interfere with treatment, beginning to pull at leads and IV.  Will redose, this time with Versed 2 mg IV.   0224 LACTIC ACID: 1.0  Improved   0227 Discussed with nephrology on-call, Dr. Kerns, regarding the patient's hypernatremia and recommendations for IV fluids.  She is recommending starting at D5W 100 mL/hr   0343 CT chest abdomen pelvis w contrast  1.  Complete collapse of the left lower lobe and severe atelectasis of the right lower lobe similar to prior examination. Superimposed aspiration or pneumonia cannot be excluded. Trace left pleural effusion.   2.  2 mm calculus in the distal right ureter just prior to the ureterovesicular junction. No significant hydronephrosis.   3.  Mild thickening of the urinary bladder wall, correlate with urinalysis for cystitis.    0344 CT head without contrast  No acute intracranial hemorrhage, midline shift, or mass effect.   0405 Request placed through PACS to discussed with critical care for transfer.  Discussed with Dr. Cardoza, ICU at [facility], who accepts the patient for transfer, stepdown 1 level of care.   0654 Transport here to transfer the patient to ICU at [facility].  It appears that the patient has ripped out one of his IVs and is agitated again.  Still with 1 IV in place.  Will give repeat dose of Versed 2 mg IV.       Medications   dextrose 5 % infusion (100 mL/hr Intravenous New Bag 12/16/24 0334)   midazolam (VERSED) injection  8 mg (8 mg Intramuscular Given 12/15/24 2337)   cefepime (MAXIPIME) IVPB (premix in dextrose) 2,000 mg 50 mL (0 mg Intravenous Stopped 12/16/24 0055)   vancomycin (VANCOCIN) 2,000 mg in sodium chloride 0.9 % 500 mL IVPB (0 mg Intravenous Stopped 12/16/24 0326)   multi-electrolyte (ISOLYTE-S PH 7.4) bolus 1,000 mL (0 mL Intravenous Stopped 12/16/24 0331)   vancomycin (VANCOCIN) 1 g injection **ADS Override Pull** (2,000 mg Intravenous Given 12/16/24 0105)   sterile water injection **ADS Override Pull** (  Given 12/16/24 0106)   midazolam (VERSED) injection 2 mg (2 mg Intravenous Given 12/16/24 0128)   iohexol (OMNIPAQUE) 350 MG/ML injection (SINGLE-DOSE) 100 mL (100 mL Intravenous Given 12/16/24 0155)   midazolam (VERSED) injection 2 mg (2 mg Intravenous Given 12/16/24 0659)       ED Risk Strat Scores                        SBIRT 20yo+      Flowsheet Row Most Recent Value   Initial Alcohol Screen: US AUDIT-C     1. How often do you have a drink containing alcohol? 0 Filed at: 12/15/2024 2359   2. How many drinks containing alcohol do you have on a typical day you are drinking?  0 Filed at: 12/15/2024 2359   3a. Male UNDER 65: How often do you have five or more drinks on one occasion? 0 Filed at: 12/15/2024 2359   3b. FEMALE Any Age, or MALE 65+: How often do you have 4 or more drinks on one occassion? 0 Filed at: 12/15/2024 2359   Audit-C Score 0 Filed at: 12/15/2024 2359   JEAN MARIE: How many times in the past year have you...    Used an illegal drug or used a prescription medication for non-medical reasons? Never Filed at: 12/15/2024 2359                            History of Present Illness       Chief Complaint   Patient presents with    Agitation     Arriving via ems from home. Patient pulled out his tracheostomy at home and is more agitated per family.        Past Medical History:   Diagnosis Date    Anxiety     Cancer (HCC)     Depression     Hypernatremia 10/06/2024    Migration of percutaneous endoscopic  "gastrostomy (PEG) tube (HCC) 2023    Psychiatric disorder     bipolar      Past Surgical History:   Procedure Laterality Date    IR BIOPSY LYMPH NODE  2023    IR GASTROSTOMY (G) TUBE CHECK/CHANGE/REINSERTION/UPSIZE  2024    IR GASTROSTOMY TUBE PLACEMENT  2023    IR LUMBAR PUNCTURE  2023    IR LUMBAR PUNCTURE  10/25/2024    IR PORT PLACEMENT  2023    ORCHIECTOMY Left 2022    Procedure: ORCHIECTOMY INGUINAL;  Surgeon: Flip Michael MD;  Location:  MAIN OR;  Service: Urology    PEG W/TRACHEOSTOMY PLACEMENT N/A 2023    Procedure: TRACHEOSTOMY WITH INSERTION PEG TUBE;  Surgeon: Rudy Mcmanus MD;  Location: [facility];  Service: General    TESTICLE SURGERY        Family History   Problem Relation Age of Onset    Coronary artery disease Maternal Aunt     Cancer Father     Diabetes Father     Hypertension Father       Social History     Tobacco Use    Smoking status: Former     Current packs/day: 0.00     Average packs/day: 0.7 packs/day for 22.7 years (15.0 ttl pk-yrs)     Types: Cigarettes     Start date: 2008     Quit date: 2023     Years since quittin.5    Smokeless tobacco: Never   Vaping Use    Vaping status: Former    Start date: 2018    Quit date: 2023    Substances: Nicotine, THC   Substance Use Topics    Alcohol use: Not Currently     Comment: 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 Not currently using crack cocaine.      E-Cigarette/Vaping    E-Cigarette Use Former User     Start Date 18     Quit Date 23       E-Cigarette/Vaping Substances    Nicotine Yes     THC Yes     CBD No     Flavoring No     Other No     Unknown No       I have reviewed and agree with the history as documented.     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 History is limited as provided by the patient due to his baseline nonverbal status as well as his current acuity.        Review of Systems   Unable to perform ROS: Patient nonverbal           Objective       ED Triage Vitals   Temperature Pulse Blood Pressure Respirations SpO2 Patient Position - Orthostatic VS   12/15/24 2347 12/15/24 2330 12/15/24 2330 12/15/24 2330 12/15/24 2342 12/16/24 0030   99.3 °F (37.4 °C) (!) 110 123/55 22 91 % Lying      Temp src Heart Rate Source BP Location FiO2 (%) Pain Score    -- 12/16/24 0100 12/16/24 0030 -- --     Monitor Left arm        Vitals      Date and Time Temp Pulse SpO2 Resp BP Pain Score FACES Pain Rating User   12/16/24 0600 97.2 °F (36.2 °C) 69 97 % 16 120/60 -- -- NORMA   12/16/24 0500 97.2 °F (36.2 °C) 55 96 % 14 105/54 -- -- NORMA   12/16/24 0430 97.2 °F (36.2 °C) 54 98 % -- 101/61 -- -- NORAM   12/16/24 0400 97.2 °F (36.2 °C) 63 98 % -- 93/50 -- -- NORMA   12/16/24 0330 97 °F (36.1 °C) 77 98 % 20 109/53 -- -- CH   12/16/24 0300 96.8 °F (36 °C) 85 97 % 18 106/57 -- -- CH   12/16/24 0215 97.2 °F (36.2 °C) 73 94 % 20 126/60 -- -- CH   12/16/24 0200 97.2 °F (36.2 °C) 68 95 % 18 109/64 -- -- CH   12/16/24 0115 98.2 °F (36.8 °C) 71 98 % 18 113/81 -- -- CH   12/16/24 0100 98.2 °F (36.8 °C) 82 97 % 20 87/50 -- --    12/16/24 0030 97.9 °F (36.6 °C) 89 97 % -- 93/54 -- --    12/16/24 0007 -- -- 95 % -- -- -- --    12/16/24 0000 -- 87 96 % 20 92/53 -- --    12/15/24 2347 99.3 °F (37.4 °C) -- -- -- -- -- --    12/15/24 2342 -- -- 91 % -- -- -- --    12/15/24 2330 -- 110 -- 22 123/55 -- -- CH            Physical Exam  Vitals and nursing note reviewed.   Constitutional:       General: He is in acute distress.      Appearance: He is  ill-appearing, toxic-appearing and diaphoretic.      Comments: Adult male lying in bed, tracheostomy present and vent dependent, nonverbal, appears acutely ill and diaphoretic, in moderate distress due to symptoms   HENT:      Head: Normocephalic and atraumatic.      Right Ear: External ear normal.      Left Ear: External ear normal.      Nose: Nose normal.      Mouth/Throat:      Mouth: Mucous membranes are dry.   Eyes:      Extraocular Movements: Extraocular movements intact.      Pupils: Pupils are equal, round, and reactive to light.   Cardiovascular:      Rate and Rhythm: Regular rhythm. Tachycardia present.      Pulses: Normal pulses.      Heart sounds: Normal heart sounds.   Pulmonary:      Comments: Tracheostomy present with moderate secretions, chronically vent dependent with his baseline tracheostomy.  Diminished breath sounds at the bases on auscultation bilaterally.  No wheezing.  No stridor.  No accessory muscle use.  Abdominal:      General: Abdomen is flat.      Palpations: Abdomen is soft.      Comments: PEG tube present. Site appears clean, dry, and intact.  No active discharge.  Abdomen is soft and nondistended.  Difficult to appreciate any abdominal tenderness, as the patient is not cooperative with exam, pulling at leads and resisting IV placement, and when I palpate his abdomen in any quadrant he localizes to my hand and pushes away.   Musculoskeletal:         General: No swelling or deformity. Normal range of motion.      Cervical back: Normal range of motion and neck supple.   Skin:     Capillary Refill: Capillary refill takes 2 to 3 seconds.      Comments: Pallor, diaphoretic   Neurological:      Comments: Nonverbal at baseline.  Appears agitated, moving extremity spontaneously and resisting lead placement or IV placement.  No focal lateralizing deficits.         Results Reviewed       Procedure Component Value Units Date/Time    HS Troponin I 2hr [[id number]]  (Normal) Collected: 12/16/24  "0157    Lab Status: Final result Specimen: Blood from Arm, Right Updated: 12/16/24 0227     hs TnI 2hr <2 ng/L      Delta 2hr hsTnI <-1 ng/L     Lactic acid 2 Hours [[id number]]  (Normal) Collected: 12/16/24 0157    Lab Status: Final result Specimen: Blood from Arm, Right Updated: 12/16/24 0218     LACTIC ACID 1.0 mmol/L     Narrative:      Result may be elevated if tourniquet was used during collection.    Sodium, urine, random [[id number]] Collected: 12/16/24 0038    Lab Status: Final result Specimen: Urine, Catheter Updated: 12/16/24 0119     Sodium, Ur 114    Creatinine, urine, random [[id number]] Collected: 12/16/24 0038    Lab Status: Final result Specimen: Urine, Catheter Updated: 12/16/24 0119     Creatinine, Ur 138.0 mg/dL     Osmolality-\"If this is regarding a toxic alcohol, please STOP and consult medical  for further guidance.\" [[id number]] Collected: 12/16/24 0038    Lab Status: In process Specimen: Blood from Arm, Right Updated: 12/16/24 0047    Osmolality, urine [[id number]] Collected: 12/16/24 0038    Lab Status: In process Specimen: Urine, Catheter Updated: 12/16/24 0047    Urine Microscopic [[id number]]  (Abnormal) Collected: 12/16/24 0017    Lab Status: Final result Specimen: Urine, Indwelling Sheppard Catheter Updated: 12/16/24 0046     RBC, UA 20-30 /hpf      WBC, UA 4-10 /hpf      Epithelial Cells Occasional /hpf      Bacteria, UA Occasional /hpf      MUCUS THREADS Occasional    UA w Reflex to Microscopic w Reflex to Culture [[id number]]  (Abnormal) Collected: 12/16/24 0017    Lab Status: Final result Specimen: Urine, Indwelling Sheppard Catheter Updated: 12/16/24 0037     Color, UA Yellow     Clarity, UA Clear     Specific Gravity, UA >=1.030     pH, UA 6.0     Leukocytes, UA Negative     Nitrite, UA Negative     Protein, UA Trace mg/dl      Glucose, UA Negative mg/dl      Ketones, UA Negative mg/dl      Urobilinogen, UA 0.2 E.U./dl      Bilirubin, UA Negative     Occult Blood, UA 3+    " Comprehensive metabolic panel [[id number]]  (Abnormal) Collected: 12/15/24 2343    Lab Status: Final result Specimen: Blood from Arm, Left Updated: 12/16/24 0030     Sodium 162 mmol/L      Potassium 4.3 mmol/L      Chloride 116 mmol/L      CO2 36 mmol/L      ANION GAP 10 mmol/L      BUN 27 mg/dL      Creatinine 0.87 mg/dL      Glucose 83 mg/dL      Calcium 10.5 mg/dL      AST 17 U/L      ALT 33 U/L      Alkaline Phosphatase 115 U/L      Total Protein 9.0 g/dL      Albumin 4.3 g/dL      Total Bilirubin 0.38 mg/dL      eGFR 110 ml/min/1.73sq m     Narrative:      National Kid[employer] Disease Foundation guidelines for Chronic Kidney Disease (CKD):     Stage 1 with normal or high GFR (GFR > 90 mL/min/1.73 square meters)    Stage 2 Mild CKD (GFR = 60-89 mL/min/1.73 square meters)    Stage 3A Moderate CKD (GFR = 45-59 mL/min/1.73 square meters)    Stage 3B Moderate CKD (GFR = 30-44 mL/min/1.73 square meters)    Stage 4 Severe CKD (GFR = 15-29 mL/min/1.73 square meters)    Stage 5 End Stage CKD (GFR <15 mL/min/1.73 square meters)  Note: GFR calculation is accurate only with a steady state creatinine    Lipase [[id number]]  (Normal) Collected: 12/15/24 2343    Lab Status: Final result Specimen: Blood from Arm, Left Updated: 12/16/24 0026     Lipase 30 u/L     Magnesium [[id number]]  (Normal) Collected: 12/15/24 2343    Lab Status: Final result Specimen: Blood from Arm, Left Updated: 12/16/24 0026     Magnesium 2.7 mg/dL     Procalcitonin [[id number]]  (Normal) Collected: 12/15/24 2343    Lab Status: Final result Specimen: Blood from Arm, Left Updated: 12/16/24 0016     Procalcitonin 0.07 ng/ml     FLU/COVID Rapid Antigen (30 min. TAT) - Preferred screening test in ED [[id number]]  (Normal) Collected: 12/15/24 2343    Lab Status: Final result Specimen: Nares from Nose Updated: 12/16/24 0015     SARS COV Rapid Antigen Negative     Influenza A Rapid Antigen Negative     Influenza B Rapid Antigen Negative    Narrative:      This test has  been performed using the Quidel Viviana 2 FLU+SARS Antigen test under the Emergency Use Authorization (EUA). This test has been validated by the  and verified by the performing laboratory. The Viviana uses lateral flow immunofluorescent sandwich assay to detect SARS-COV, Influenza A and Influenza B Antigen.     The Quidel Viviana 2 SARS Antigen test does not differentiate between SARS-CoV and SARS-CoV-2.     Negative results are presumptive and may be confirmed with a molecular assay, if necessary, for patient management. Negative results do not rule out SARS-CoV-2 or influenza infection and should not be used as the sole basis for treatment or patient management decisions. A negative test result may occur if the level of antigen in a sample is below the limit of detection of this test.     Positive results are indicative of the presence of viral antigens, but do not rule out bacterial infection or co-infection with other viruses.     All test results should be used as an adjunct to clinical observations and other information available to the provider.    FOR PEDIATRIC PATIENTS - copy/paste COVID Guidelines URL to browser: [email or web address]    HS Troponin 0hr (reflex protocol) [[id number]]  (Normal) Collected: 12/15/24 2343    Lab Status: Final result Specimen: Blood from Hand, Left Updated: 12/16/24 0014     hs TnI 0hr 3 ng/L     Lactic acid [[id number]]  (Abnormal) Collected: 12/15/24 2343    Lab Status: Final result Specimen: Blood from Arm, Left Updated: 12/16/24 0010     LACTIC ACID 3.1 mmol/L     Narrative:      Result may be elevated if tourniquet was used during collection.    Blood gas, Venous [[id number]]  (Abnormal) Collected: 12/15/24 2350    Lab Status: Final result Specimen: Blood from Arm, Left Updated: 12/16/24 0004     pH, Rasheed 7.318     pCO2, Rasheed 53.5 mm Hg      pO2, Rasheed 58.8 mm Hg      HCO3, Rasheed 26.8 mmol/L      Base Excess, Rasheed -0.1  mmol/L      O2 Content, Rasheed 16.4 ml/dL      O2 HGB, VENOUS 88.8 %     Protime-INR [[id number]]  (Abnormal) Collected: 12/15/24 2343    Lab Status: Final result Specimen: Blood from Arm, Left Updated: 12/16/24 0002     Protime 15.5 seconds      INR 1.19    Narrative:      INR Therapeutic Range    Indication                                             INR Range      Atrial Fibrillation                                               2.0-3.0  Hypercoagulable State                                    2.0.2.3  Left Ventricular Asist Device                            2.0-3.0  Mechanical Heart Valve                                  -    Aortic(with afib, MI, embolism, HF, LA enlargement,    and/or coagulopathy)                                     2.0-3.0 (2.5-3.5)     Mitral                                                             2.5-3.5  Prosthetic/Bioprosthetic Heart Valve               2.0-3.0  Venous thromboembolism (VTE: VT, PE        2.0-3.0    APTT [[id number]]  (Normal) Collected: 12/15/24 2343    Lab Status: Final result Specimen: Blood from Arm, Left Updated: 12/16/24 0002     PTT 28 seconds     Blood culture #1 [[id number]] Collected: 12/15/24 2353    Lab Status: In process Specimen: Blood from Arm, Right Updated: 12/15/24 2356    CBC and differential [[id number]]  (Abnormal) Collected: 12/15/24 2343    Lab Status: Final result Specimen: Blood from Arm, Left Updated: 12/15/24 2351     WBC 14.84 Thousand/uL      RBC 4.62 Million/uL      Hemoglobin 14.0 g/dL      Hematocrit 47.6 %       fL      MCH 30.3 pg      MCHC 29.4 g/dL      RDW 14.7 %      MPV 10.5 fL      Platelets 281 Thousands/uL      nRBC 0 /100 WBCs      Segmented % 79 %      Immature Grans % 1 %      Lymphocytes % 12 %      Monocytes % 8 %      Eosinophils Relative 0 %      Basophils Relative 0 %      Absolute Neutrophils 11.84 Thousands/µL      Absolute Immature Grans 0.09 Thousand/uL      Absolute Lymphocytes 1.70 Thousands/µL      Absolute  Monocytes 1.13 Thousand/µL      Eosinophils Absolute 0.02 Thousand/µL      Basophils Absolute 0.06 Thousands/µL     Blood culture #2 [[id number]] Collected: 12/15/24 2343    Lab Status: In process Specimen: Blood from Arm, Left Updated: 12/15/24 2347            CT chest abdomen pelvis w contrast   Final Interpretation by Meng Cuba MD (12/16 0339)      1.  Complete collapse of the left lower lobe and severe atelectasis of the right lower lobe similar to prior examination. Superimposed aspiration or pneumonia cannot be excluded. Trace left pleural effusion.   2.  2 mm calculus in the distal right ureter just prior to the ureterovesicular junction. No significant hydronephrosis.   3.  Mild thickening of the urinary bladder wall, correlate with urinalysis for cystitis.      Workstation performed: [id number]         CT head without contrast   Final Interpretation by Meng Cuba MD (12/16 0339)      No acute intracranial hemorrhage, midline shift, or mass effect.      Workstation performed: [id number]         XR chest portable    (Results Pending)       CriticalCare Time    Date/Time: 12/16/2024 4:20 AM    Performed by: David Gay MD  Authorized by: David Gay MD    Critical care provider statement:     Critical care time (minutes):  41    Critical care time was exclusive of:  Separately billable procedures and treating other patients and teaching time    Critical care was necessary to treat or prevent imminent or life-threatening deterioration of the following conditions:  Sepsis    Critical care was time spent personally by me on the following activities:  Blood draw for specimens, obtaining history from patient or surrogate, evaluation of patient's response to treatment, discussions with primary provider, discussions with consultants, development of treatment plan with patient or surrogate, examination of patient, ventilator management, review of old charts, re-evaluation of patient's condition, ordering  "and review of radiographic studies, ordering and review of laboratory studies and ordering and performing treatments and interventions    I assumed direction of critical care for this patient from another provider in my specialty: no        ED Medication and Procedure Management   Prior to Admission Medications   Prescriptions Last Dose Informant Patient Reported? Taking?   Incontinence Supply Disposable (Comfort Shield Adult Diapers) MISC  Family Member No No   Sig: Use 1 each 4 (four) times a day   NEEDLE, DISP, 18 G 18G X 1\" MISC   No No   Sig: Use 2 (two) times a week   Oral Hygiene Products (Toothette Swabs/Dentifrice) SWAB  Family Member No No   Sig: Apply to the mouth or throat 3 (three) times a day   Syringe/Needle, Disp, (Syringe Luer Slip) 25G X 5/8\" 1 ML MISC   No No   Sig: Use 2 (two) times a week   apixaban (ELIQUIS) 5 mg  Family Member No No   Si tablet (5 mg total) by Per PEG Tube route 2 (two) times a day   gabapentin (NEURONTIN) 250 mg/5 mL solution   No No   Sig: Take 2 mL (100 mg total) by mouth every 8 (eight) hours for 2 days, THEN 2 mL (100 mg total) every 12 (twelve) hours for 2 days, THEN 2 mL (100 mg total) daily at bedtime for 2 days.   Patient not taking: Reported on 2024   miconazole 2 % cream   No No   Sig: Apply topically 2 (two) times a day Apply until resolution, see your PCP for follow up   Patient not taking: Reported on 2024   oxygen gas  Family Member Yes No   Sig: Inhale 6 L/min as needed   sodium chloride (BRONCHO SALINE) inhaler solution  Family Member Yes No   Sig: Take 1 spray by nebulization every 8 (eight) hours   testosterone cypionate (DEPO-TESTOSTERONE) 200 mg/mL SOLN   No No   Sig: Inject 0.5 mL (100 mg total) into a muscle 2 (two) times a week   Patient not taking: Reported on 2024      Facility-Administered Medications: None     Discharge Medication List as of 2024  7:17 AM        CONTINUE these medications which have NOT CHANGED    Details " "  apixaban (ELIQUIS) 5 mg 1 tablet (5 mg total) by Per PEG Tube route 2 (two) times a day, Starting Wed 10/18/2023, Normal      gabapentin (NEURONTIN) 250 mg/5 mL solution Multiple Dosages:Starting Wed 10/30/2024, Until Thu 10/31/2024 at 2359, THEN Starting Fri 11/1/2024, Until Sat 11/2/2024 at 2359, THEN Starting Sun 11/3/2024, Until Mon 11/4/2024 at 2359Take 2 mL (100 mg total) by mouth every 8 (eight) hours for 2 da ys, THEN 2 mL (100 mg total) every 12 (twelve) hours for 2 days, THEN 2 mL (100 mg total) daily at bedtime for 2 days., Normal      Incontinence Supply Disposable (Comfort Shield Adult Diapers) MISC Use 1 each 4 (four) times a day, Starting Wed 10/18/2023, Normal      miconazole 2 % cream Apply topically 2 (two) times a day Apply until resolution, see your PCP for follow up, Starting Wed 10/30/2024, Until Fri 11/29/2024, Normal      NEEDLE, DISP, 18 G 18G X 1\" MISC Use 2 (two) times a week, Starting Thu 8/22/2024, Normal      Oral Hygiene Products (Toothette Swabs/Dentifrice) SWAB Apply to the mouth or throat 3 (three) times a day, Starting Wed 10/18/2023, Normal      oxygen gas Inhale 6 L/min as needed, Historical Med      sodium chloride (BRONCHO SALINE) inhaler solution Take 1 spray by nebulization every 8 (eight) hours, Historical Med      Syringe/Needle, Disp, (Syringe Luer Slip) 25G X 5/8\" 1 ML MISC Use 2 (two) times a week, Starting Thu 8/22/2024, Normal      testosterone cypionate (DEPO-TESTOSTERONE) 200 mg/mL SOLN Inject 0.5 mL (100 mg total) into a muscle 2 (two) times a week, Starting Thu 8/22/2024, Normal           No discharge procedures on file.  ED SEPSIS DOCUMENTATION   Time reflects when diagnosis was documented in both MDM as applicable and the Disposition within this note       Time User Action Codes Description Comment    12/16/2024  2:26 AM David Gay Add [A41.9] Sepsis (HCC)     12/16/2024  2:26 AM David Gay Add [G92.8] Toxic metabolic encephalopathy     12/16/2024  2:27 " "AM David Gay [R45.1] Agitation     12/16/2024  2:27 AM David Gay [E87.0] Hypernatremia     12/16/2024  2:27 AM David Gay [Z93.0] Tracheostomy dependent (HCC)     12/16/2024  3:44 AM David Gay [J18.9] Pneumonia            Initial Sepsis Screening       Row Name 12/16/24 0026                Is the patient's history suggestive of a new or worsening infection? Yes (Proceed)  -CW        Suspected source of infection suspect infection, source unknown;pneumonia  -CW        Indicate SIRS criteria Tachycardia > 90 bpm;Leukocytosis (WBC > 72509 IJL) OR Leukopenia (WBC <4000 IJL) OR Bandemia (WBC >10% bands)  -CW        Are two or more of the above signs & symptoms of infection both present and new to the patient? Yes (Proceed)  -CW        Assess for evidence of organ dysfunction: Are any of the below criteria present within 6 hours of suspected infection and SIRS criteria that are NOT considered to be chronic conditions? Lactate > 2.0  -CW        Date of presentation of severe sepsis 12/16/24  -        Time of presentation of severe sepsis 0027  -CW        Sepsis Note: Click \"NEXT\" below (NOT \"close\") to generate sepsis note based on above information. --                  User Key  (r) = Recorded By, (t) = Taken By, (c) = Cosigned By      Initials Name Provider Type    CW David Gay MD Physician                  Default Flowsheet Data (Last 720 Hours)       Sepsis Reassess       Row Name 12/16/24 0225                   Repeat Volume Status and Tissue Perfusion Assessment Performed    Date of Reassessment: 12/16/24  -        Time of Reassessment: 0225 -CW        Sepsis Reassessment Note: Click \"NEXT\" below (NOT \"close\") to generate sepsis reassessment note. YES (proceed by clicking \"NEXT\")  -CW        Repeat Volume Status and Tissue Perfusion Assessment Performed --                  User Key  (r) = Recorded By, (t) = Taken By, (c) = Cosigned By      Initials Name Provider Type    CW " David Gay MD Physician                     David Gay MD  12/16/24 6316

## 2024-12-16 NOTE — PLAN OF CARE
Problem: RESPIRATORY - ADULT  Goal: Achieves optimal ventilation and oxygenation  Description: INTERVENTIONS:  - Assess for changes in respiratory status  - Assess for changes in mentation and behavior  - Position to facilitate oxygenation and minimize respiratory effort  - Oxygen administered by appropriate delivery if ordered  - Initiate smoking cessation education as indicated  - Encourage broncho-pulmonary hygiene including cough, deep breathe, Incentive Spirometry  - Assess the need for suctioning and aspirate as needed  - Assess and instruct to report SOB or any respiratory difficulty  - Respiratory Therapy support as indicated  Outcome: Progressing     Problem: Prexisting or High Potential for Compromised Skin Integrity  Goal: Skin integrity is maintained or improved  Description: INTERVENTIONS:  - Identify patients at risk for skin breakdown  - Assess and monitor skin integrity  - Assess and monitor nutrition and hydration status  - Monitor labs   - Assess for incontinence   - Turn and reposition patient  - Assist with mobility/ambulation  - Relieve pressure over bony prominences  - Avoid friction and shearing  - Provide appropriate hygiene as needed including keeping skin clean and dry  - Evaluate need for skin moisturizer/barrier cream  - Collaborate with interdisciplinary team   - Patient/family teaching  - Consider wound care consult   Outcome: Progressing     Problem: PAIN - ADULT  Goal: Verbalizes/displays adequate comfort level or baseline comfort level  Description: Interventions:  - Encourage patient to monitor pain and request assistance  - Assess pain using appropriate pain scale  - Administer analgesics based on type and severity of pain and evaluate response  - Implement non-pharmacological measures as appropriate and evaluate response  - Consider cultural and social influences on pain and pain management  - Notify physician/advanced practitioner if interventions unsuccessful or patient  reports new pain  Outcome: Progressing     Problem: INFECTION - ADULT  Goal: Absence or prevention of progression during hospitalization  Description: INTERVENTIONS:  - Assess and monitor for signs and symptoms of infection  - Monitor lab/diagnostic results  - Monitor all insertion sites, i.e. indwelling lines, tubes, and drains  - Monitor endotracheal if appropriate and nasal secretions for changes in amount and color  - [place] appropriate cooling/warming therapies per order  - Administer medications as ordered  - Instruct and encourage patient and family to use good hand hygiene technique  - Identify and instruct in appropriate isolation precautions for identified infection/condition  Outcome: Progressing  Goal: Absence of fever/infection during neutropenic period  Description: INTERVENTIONS:  - Monitor WBC    Outcome: Progressing    Goal: Maintain or return to baseline ADL function  Description: INTERVENTIONS:  -  Assess patient's ability to carry out ADLs; assess patient's baseline for ADL function and identify physical deficits which impact ability to perform ADLs (bathing, care of mouth/teeth, toileting, grooming, dressing, etc.)  - Assess/evaluate cause of self-care deficits   - Assess range of motion  - Assess patient's mobility; develop plan if impaired  - Assess patient's need for assistive devices and provide as appropriate  - Encourage maximum independence but intervene and supervise when necessary  - Involve family in performance of ADLs  - Assess for home care needs following discharge   - Consider OT consult to assist with ADL evaluation and planning for discharge  - Provide patient education as appropriate  Outcome: Progressing  Goal: Maintains/Returns to pre admission functional level  Description: INTERVENTIONS:  - Perform AM-PAC 6 Click Basic Mobility/ Daily Activity assessment daily.  - Set and communicate daily mobility goal to care team and patient/family/caregiver.   - Collaborate with  rehabilitation services on mobility goals if consulted    Problem: DISCHARGE PLANNING  Goal: Discharge to home or other facility with appropriate resources  Description: INTERVENTIONS:  - Identify barriers to discharge w/patient and caregiver  - Arrange for needed discharge resources and transportation as appropriate  - Identify discharge learning needs (meds, wound care, etc.)  - Arrange for interpretive services to assist at discharge as needed  - Refer to Case Management Department for coordinating discharge planning if the patient needs post-hospital services based on physician/advanced practitioner order or complex needs related to functional status, cognitive ability, or social support system  Outcome: Progressing     Problem: Knowledge Deficit  Goal: Patient/family/caregiver demonstrates understanding of disease process, treatment plan, medications, and discharge instructions  Description: Complete learning assessment and assess knowledge base.  Interventions:  - Provide teaching at level of understanding  - Provide teaching via preferred learning methods  Outcome: Progressing     - Out of bed for toileting  - Record patient progress and toleration of activity level   Outcome: Progressing

## 2024-12-16 NOTE — SEPSIS NOTE
"  Sepsis Note   Hemanth Swanson 39 y.o. male MRN: [id number]  Unit/Bed#: ED 09 Encounter: [id number]       Initial Sepsis Screening       Row Name 12/16/24 0026                Is the patient's history suggestive of a new or worsening infection? Yes (Proceed)  -CW        Suspected source of infection suspect infection, source unknown;pneumonia  -CW        Indicate SIRS criteria Tachycardia > 90 bpm;Leukocytosis (WBC > 10384 IJL) OR Leukopenia (WBC <4000 IJL) OR Bandemia (WBC >10% bands)  -CW        Are two or more of the above signs & symptoms of infection both present and new to the patient? Yes (Proceed)  -CW        Assess for evidence of organ dysfunction: Are any of the below criteria present within 6 hours of suspected infection and SIRS criteria that are NOT considered to be chronic conditions? Lactate > 2.0  -CW        Date of presentation of severe sepsis 12/16/24  -CW        Time of presentation of severe sepsis 0027  -CW        Sepsis Note: Click \"NEXT\" below (NOT \"close\") to generate sepsis note based on above information. --                  User Key  (r) = Recorded By, (t) = Taken By, (c) = Cosigned By      Initials Name Provider Type    CW David Gay MD Physician                        There is no height or weight on file to calculate BMI.  Wt Readings from Last 1 Encounters:   10/31/24 101 kg (222 lb 14.2 oz)        Ideal body weight: 86.8 kg (191 lb 5.7 oz)  Adjusted ideal body weight: 92.5 kg (203 lb 15.5 oz)    "

## 2024-12-16 NOTE — H&P
H&P - Critical Care/ICU   Name: Hemanth Swanson 39 y.o. male I MRN: [id number]  Unit/Bed#: ICU 05 I Date of Admission: 12/16/2024   Date of Service: 12/16/2024 I Hospital Day: 0       Assessment & Plan  Toxic metabolic encephalopathy  Patient with history of waxing and waning agitation   Required 8 mg IM Versed + 2 mg IV versed at SLE  CT negative  UDS + THC, benzos   MRI during previous admission revealed images consistent with anoxic brain injury    Plan:  PRN ativan for agitation   Limit sedatives that can cause bradycardia; as patient has history of vagal/bradycardiac events   Delirium precautions  Infectious work-up   Correct sodium as outlined   Acute on chronic respiratory failure with hypoxia and hypercapnia (HCC)  S/p trach for NM disease   Trach 6XL  History of significant mucous plugs  CT chest revealing Complete collapse of the left lower lobe and severe atelectasis of the right lower lobe similar to prior examination. Superimposed aspiration or pneumonia cannot be excluded. Trace left pleural effusion.     Plan:  Agressive pulmonary hygiene   Mucomyst, albuterol  Chest therapy  Frequent suctioning/turning  Monitor off antibiotics  Attempt trach collar pending mentation  SIRS (systemic inflammatory response syndrome) (HCC)  As evidenced by leukocytosis and lactic acidosis  Follow-up blood cultures  Follow-up urine antigens  Obtain sputum culture  Monitor off antibiotics  Hypernatremia  Presents with sodium of 162  Per chart review appears patient was on p.o. 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 who presented to [facility] for increased agitation and concerns for infection. Patient has a past medical history of neuromuscular disease status post trach and PEG, chronic respiratory failure, HFpEF, diabetes, hypertension and history of testicular cancer status post resection and chemo.     Patient was admitted to [facility] 10/5 to 10/31.  During that hospitalization he was extensively evaluated for his underlying neuromuscular disease in addition for his toxic metabolic encephalopathy.     Patient resides at home with his family who reports he had increasing agitation and was sent to [facility] for further evaluation.  He required 8 mg IM Versed +2 mg IV Versed.  CT imaging was obtained revealing complete collapse of the left lower lobe and severe atelectasis of the right lower lobe similar to prior exam in October.    Patient was transferred to [facility] for further work-up and treatment.     Update provided to patients mother by Dr. Sutherland     History obtained from chart review.  Review of Systems: Review of Systems not obtainable due to Altered mental status, Uncooperative patient    Historical Information   Past Medical History:  No date: Anxiety  No date: Cancer (HCC)  No date: Depression  10/06/2024: Hypernatremia  09/24/2023: Migration of percutaneous endoscopic gastrostomy (PEG)   tube (HCC)  No date: Psychiatric disorder      Comment:  bipolar Past Surgical History:  01/20/2023: IR BIOPSY LYMPH NODE  6/18/2024: IR GASTROSTOMY (G) TUBE CHECK/CHANGE/REINSERTION/UPSIZE  09/25/2023: IR GASTROSTOMY TUBE PLACEMENT  09/06/2023: IR LUMBAR PUNCTURE  10/25/2024: IR LUMBAR PUNCTURE  03/14/2023: IR PORT PLACEMENT  12/14/2022: ORCHIECTOMY; Left      Comment:  Procedure: ORCHIECTOMY INGUINAL;  Surgeon: Flip Michael MD;  Location: [facility];  Service: Urology  09/08/2023: PEG W/TRACHEOSTOMY PLACEMENT; N/A     "  Comment:  Procedure: TRACHEOSTOMY WITH INSERTION PEG TUBE;                 Surgeon: Rudy Mcmauns MD;  Location: [facility];                 Service: General  No date: TESTICLE SURGERY   Current Outpatient Medications   Medication Instructions    apixaban (ELIQUIS) 5 mg, Per PEG Tube, 2 times daily    gabapentin (NEURONTIN) 250 mg/5 mL solution Take 2 mL (100 mg total) by mouth every 8 (eight) hours for 2 days, THEN 2 mL (100 mg total) every 12 (twelve) hours for 2 days, THEN 2 mL (100 mg total) daily at bedtime for 2 days.    Incontinence Supply Disposable (Comfort Shield Adult Diapers) MISC 1 each, Does not apply, 4 times daily    miconazole 2 % cream Topical, 2 times daily, Apply until resolution, see your PCP for follow up    NEEDLE, DISP, 18 G 18G X 1\" MISC Does not apply, 2 times weekly    Oral Hygiene Products (Toothette Swabs/Dentifrice) SWAB Mouth/Throat, 3 times daily    oxygen 6 L/min, As needed    sodium chloride (BRONCHO SALINE) inhaler solution 1 spray, Every 8 hours    Syringe/Needle, Disp, (Syringe Luer Slip) 25G X 5/8\" 1 ML MISC Does not apply, 2 times weekly    testosterone cypionate (DEPO-TESTOSTERONE) 100 mg, Intramuscular, 2 times weekly    Allergies   Allergen Reactions    Dog Epithelium Cough, Sneezing and Nasal Congestion      Social History     Tobacco Use    Smoking status: Former     Current packs/day: 0.00     Average packs/day: 0.7 packs/day for 22.7 years (15.0 ttl pk-yrs)     Types: Cigarettes     Start date: 2008     Quit date: 2023     Years since quittin.5    Smokeless tobacco: Never   Vaping Use    Vaping status: Former    Start date: 2018    Quit date: 2023    Substances: Nicotine, THC   Substance Use Topics    Alcohol use: Not Currently     Comment: 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 Not currently using crack cocaine.    Family History   Problem " Relation Age of Onset    Coronary artery disease Maternal Aunt     Cancer Father     Diabetes Father     Hypertension Father           Objective :                   Vitals I/O      Most Recent Min/Max in 24hrs   Temp 98 °F (36.7 °C) Temp  Min: 96.8 °F (36 °C)  Max: 99.3 °F (37.4 °C)   Pulse 58 Pulse  Min: 54  Max: 110   Resp 15 Resp  Min: 14  Max: 71   /57 BP  Min: 87/50  Max: 126/60   O2 Sat 98 % SpO2  Min: 91 %  Max: 98 %    No intake or output data in the 24 hours ending 12/16/24 1014    Diet NPO    Invasive Monitoring           Physical Exam   Physical Exam  Vitals reviewed.   Skin:     General: Skin is warm and dry.      Findings: Rash (scattered red rash) present.   HENT:      Mouth/Throat:      Mouth: Mucous membranes are dry.   Cardiovascular:      Rate and Rhythm: Regular rhythm. Bradycardia present.      Pulses: Normal pulses.   Musculoskeletal:         General: Normal range of motion.      Right lower leg: No edema.      Left lower leg: No edema.   Abdominal:      Palpations: Abdomen is soft.   Constitutional:       Appearance: He is ill-appearing.      Interventions: He is restrained.   Pulmonary:      Breath sounds: Rhonchi present.      Comments: trach  Secretions are abnormal .Psychiatric:         Behavior: Behavior is uncooperative.   Neurological:      Mental Status: He is agitated.          Diagnostic Studies        Lab Results: I have reviewed the following results:     Medications:  Scheduled PRN   acetylcysteine, 3 mL, Q6H While awake  apixaban, 5 mg, BID  chlorhexidine, 15 mL, Q12H ISAEL  ipratropium-albuterol, 3 mL, Q6H      LORazepam, 2 mg, Q4H PRN       Continuous    dextrose, 100 mL/hr, Last Rate: 100 mL/hr (12/16/24 0913)         Labs:   CBC    Recent Labs     12/15/24  2343 12/16/24  0822   WBC 14.84* 13.23*   HGB 14.0 11.3*   HCT 47.6 37.3    236     BMP    Recent Labs     12/15/24  2343 12/16/24  0822   SODIUM 162* 154*   K 4.3 3.9   * 117*   CO2 36* 29   AGAP 10 8    BUN 27* 23   CREATININE 0.87 0.65   CALCIUM 10.5* 9.2       Coags    Recent Labs     12/15/24  2343   INR 1.19   PTT 28        Additional Electrolytes  Recent Labs     12/15/24  2343 12/16/24  0822   MG 2.7 2.6   PHOS  --  3.2   CAIONIZED  --  1.14          Blood Gas    No recent results  Recent Labs     12/15/24  2350   PHVEN 7.318   EYV9GIP 53.5*   PO2VEN 58.8*   UUY7LWT 26.8   BEVEN -0.1   M1ISDPM 88.8*    LFTs  Recent Labs     12/15/24  2343 12/16/24  0822   ALT 33 24   AST 17 18   ALKPHOS 115* 95   ALB 4.3 3.7   TBILI 0.38 0.48       Infectious  Recent Labs     12/15/24  2343 12/16/24  0822   PROCALCITONI 0.07 0.11     Glucose  Recent Labs     12/15/24  2343 12/16/24  0822   GLUC 83 75

## 2024-12-16 NOTE — SEPSIS NOTE
Sepsis Note   Hemanth Swanson 39 y.o. male MRN: [id number]  Unit/Bed#: ICU 05 Encounter: [id number]       Initial Sepsis Screening       Row Name 12/16/24 1445                Is the patient's history suggestive of a new or worsening infection? No  -TT        Assess for evidence of organ dysfunction: Are any of the below criteria present within 6 hours of suspected infection and SIRS criteria that are NOT considered to be chronic conditions? --        Date of presentation of severe sepsis --        Time of presentation of severe sepsis --                  User Key  (r) = Recorded By, (t) = Taken By, (c) = Cosigned By      Initials Name Provider Type    TT AMBER Mei Nurse Practitioner                        Body mass index is 26.14 kg/m².  Wt Readings from Last 1 Encounters:   12/16/24 97.4 kg (214 lb 11.7 oz)     IBW (Ideal Body Weight): 86.8 kg    Ideal body weight: 86.8 kg (191 lb 5.7 oz)  Adjusted ideal body weight: 91 kg (200 lb 11.3 oz)

## 2024-12-16 NOTE — ASSESSMENT & PLAN NOTE
As evidenced by leukocytosis and lactic acidosis  Follow-up blood cultures  Follow-up urine antigens  Obtain sputum culture  Monitor off antibiotics

## 2024-12-16 NOTE — ASSESSMENT & PLAN NOTE
Patient with history of waxing and waning agitation   Required 8 mg IM Versed + 2 mg IV versed at SLE  CT negative  UDS + THC, benzos   MRI during previous admission revealed images consistent with anoxic brain injury    Plan:  PRN ativan for agitation   Limit sedatives that can cause bradycardia; as patient has history of vagal/bradycardiac events   Delirium precautions  Infectious work-up   Correct sodium as outlined

## 2024-12-16 NOTE — EMTALA/ACUTE CARE TRANSFER
[facility] EMERGENCY DEPARTMENT  [address] [place] [id number]  Dept: [phone]      EMTALA TRANSFER CONSENT    NAME Hemanth Swanson                                         1985                              MRN [id number]    I have been informed of my rights regarding examination, treatment, and transfer   by Dr. David Gay MD    Benefits: Specialized equipment and/or services available at the receiving facility (Include comment)________________________    Risks: Potential for delay in receiving treatment, Potential deterioration of medical condition, Loss of IV, Possible worsening of condition or death during transfer, Increased discomfort during transfer      Consent for Transfer:  I acknowledge that my medical condition has been evaluated and explained to me by the emergency department physician or other qualified medical person and/or my attending physician, who has recommended that I be transferred to the service of  Accepting Physician: Dr. Cardoza at Accepting Facility Name, City & State : [place], [place]. The above potential benefits of such transfer, the potential risks associated with such transfer, and the probable risks of not being transferred have been explained to me, and I fully understand them.  The doctor has explained that, in my case, the benefits of transfer outweigh the risks.  I agree to be transferred.    I authorize the performance of emergency medical procedures and treatments upon me in both transit and upon arrival at the receiving facility.  Additionally, I authorize the release of any and all medical records to the receiving facility and request they be transported with me, if possible.  I understand that the safest mode of transportation during a medical emergency is an ambulance and that the Hospital advocates the use of this mode of transport. Risks of traveling to the receiving facility by car, including absence of  medical control, life sustaining equipment, such as oxygen, and medical personnel has been explained to me and I fully understand them.    (RILEY CORRECT BOX BELOW)  [  ]  I consent to the stated transfer and to be transported by ambulance/helicopter.  [  ]  I consent to the stated transfer, but refuse transportation by ambulance and accept full responsibility for my transportation by car.  I understand the risks of non-ambulance transfers and I exonerate the Hospital and its staff from any deterioration in my condition that results from this refusal.    X___________________________________________    DATE  24  TIME________  Signature of patient or legally responsible individual signing on patient behalf           RELATIONSHIP TO PATIENT_________________________          Provider Certification    NAME Hemanth Swanson                                        [facility] 1985                              MRN [id number]    A medical screening exam was performed on the above named patient.  Based on the examination:    Condition Necessitating Transfer The primary encounter diagnosis was Sepsis (HCC). Diagnoses of Toxic metabolic encephalopathy, Agitation, Hypernatremia, Tracheostomy dependent (HCC), and Pneumonia were also pertinent to this visit.    Patient Condition: The patient has been stabilized such that within reasonable medical probability, no material deterioration of the patient condition or the condition of the unborn child(capri) is likely to result from the transfer    Reason for Transfer: Level of Care needed not available at this facility    Transfer Requirements: Facility [facility], [place], [place]   Space available and qualified personnel available for treatment as acknowledged by    Agreed to accept transfer and to provide appropriate medical treatment as acknowledged by       Dr. Cardoza  Appropriate medical records of the examination and treatment of the patient are provided at the  time of transfer   STAFF INITIAL WHEN COMPLETED _______  Transfer will be performed by qualified personnel from    and appropriate transfer equipment as required, including the use of necessary and appropriate life support measures.    Provider Certification: I have examined the patient and explained the following risks and benefits of being transferred/refusing transfer to the patient/family:  General risk, such as traffic hazards, adverse weather conditions, rough terrain or turbulence, possible failure of equipment (including vehicle or aircraft), or consequences of actions of persons outside the control of the transport personnel, Unanticipated needs of medical equipment and personnel during transport, Risk of worsening condition, The possibility of a transport vehicle being unavailable      Based on these reasonable risks and benefits to the patient and/or the unborn child(capri), and based upon the information available at the time of the patient’s examination, I certify that the medical benefits reasonably to be expected from the provision of appropriate medical treatments at another medical facility outweigh the increasing risks, if any, to the individual’s medical condition, and in the case of labor to the unborn child, from effecting the transfer.    X____________________________________________ DATE 12/16/24        TIME_______      ORIGINAL - SEND TO MEDICAL RECORDS   COPY - SEND WITH PATIENT DURING TRANSFER

## 2024-12-16 NOTE — PROGRESS NOTES
Critical Care Attending Note; John Sutherland   Note Date: 24  Note Time: 9:56 AM    Patient: Hemanth Swanson  Age, : 39 y.o., 1985 MRN: [id number] Code Status: Level 1 - Full Code Patient Location: ICU 05/ICU 05   Hospital LOS:0 days  ]   Patient seen and examined, medical record reviewed, discussed with house staff and nursing staff.     HPI   CC: AMS  39M with a PMH Seminoma(Orcetomy/Chemo), HTN, HFpEF, DM, Cardiac Arrest(Hypoxic - ), PE, Demyelinating Neuromuscular Disorder(Trach/Peg), recurrent PEG tube dislodgement who presents due to agitation found to have hypernatremia.       Main ICU Plans:       #Neuro  Demyelinating NM disorder/Agitation - Baseline able to eat  - Delirium precautions  - PRN Ativan    #Card  Bradycardia - likely secondary to vagaling   - Tele     #Pulm  Respiratory Failure - Mucous plugging - Low utility of bronchoscopy at this time, repeat Ct imaging shows scarred lower lobes.   - Aggressive Pulm hygiene regimen - mucomyst, albuterol, chest PT  - LTVV VC - 6-8cc/kg IDBW  - Wean FiO2 - Maintain Oxygen Sat >92%  - VAP Bundle  - HOB > 30o    - PEEP Titrate      Pulmonary embolism  - Eliquis    #Renal  Hypernatremia - Goal 150 2300  - D5W  - Trend BMP    #GI  Bowel regimn    #ID   Bronchitis - Lower concerns for active infection, leukocytosis likely from hemoconcentration  - Discontinue Vanc/Zosyn  - Follow up infectious workup      #DVT/GI ppx  Eliquis    #Lines/Tubes/Drains:   Invasive Devices       Central Venous Catheter Line  Duration             Port A Cath 23 Right Subclavian 643 days              Peripheral Intravenous Line  Duration             Peripheral IV 12/15/24 Distal;Right;Ventral (anterior) Forearm <1 day    Peripheral IV 24 Left;Proximal;Ventral (anterior) Forearm <1 day    Peripheral IV 24 Proximal;Right;Upper;Ventral (anterior) Arm <1 day              Drain  Duration             Gastrostomy/Enterostomy Percutaneous Interventional  Gastrostomy 18 Fr.  days    Urethral Catheter Temperature probe 16 Fr. <1 day              Airway  Duration             Surgical Airway Shiley Distal 46 days                    #Nutrition:   Diet NPO        #Code Status:   Level 1 - Full Code    #Dispo:   ICU        John Sutherland MD  Pulmonary, Critical Care    Critical care time, excluding procedures, teaching, family meetings, and excludes any prior time recorded by the AP/resident, 35 minutes. Upon my evaluation, this patient has a high probability of imminent or life-threatening deterioration due to above problems which required my direct attention, intervention, and personal management.   Impression/Active Problems:     HTN   HFpEF   DM   Neuromuscualr disorder   Tracheostomy   PEG   Hypernatremia   Aspiration   Vaso vagal    Bradycardia  AMS         Physical Exam:     Vital Signs:   Weight:    IBW: Ideal body weight: 86.8 kg (191 lb 5.7 oz)  Adjusted ideal body weight: 91 kg (200 lb 11.3 oz)  Temp:  [96.8 °F (36 °C)-99.3 °F (37.4 °C)] 98 °F (36.7 °C)  HR:  [] 58  BP: ()/(50-81) 106/57  Resp:  [14-71] 15  SpO2:  [91 %-98 %] 98 %  O2 Device: T-Piece  General: NAD  Neuro: Minimal interaction  Heart: RRR  Lungs: Non labored  Abdomen: Soft  Extremities: Min edema                Ventilator Settings:         Results from last 7 days   Lab Units 12/15/24  2350   PO2 SYLVESTER mm Hg 58.8*     Radiologic Images Reviewed:    CT C/A/P  1.  Complete collapse of the left lower lobe and severe atelectasis of the right lower lobe similar to prior examination. Superimposed aspiration or pneumonia cannot be excluded. Trace left pleural effusion.   2.  2 mm calculus in the distal right ureter just prior to the ureterovesicular junction. No significant hydronephrosis.   3.  Mild thickening of the urinary bladder wall, correlate with urinalysis for cystitis.     CT head  No acute intracranial hemorrhage, midline shift, or mass effect.   Input / Output:   No intake  "or output data in the 24 hours ending 12/16/24 0956         Infusions:  dextrose, 100 mL/hr, Last Rate: 100 mL/hr (12/16/24 0913)      Scheduled Medications:  Current Facility-Administered Medications   Medication Dose Route Frequency Provider Last Rate    acetylcysteine  3 mL Nebulization Q6H While awake AMBER Mei      apixaban  5 mg Per PEG Tube BID AMBER Mei      chlorhexidine  15 mL Mouth/Throat Q12H ISAEL AMBER Mei      dextrose  100 mL/hr Intravenous Continuous AMBER Mei 100 mL/hr (12/16/24 0913)    ipratropium-albuterol  3 mL Nebulization Q6H AMBER Mei      LORazepam  2 mg Intravenous Q4H PRN AMBER Mei      piperacillin-tazobactam  4.5 g Intravenous Q8H AMBER Mei         PRN Medications:    LORazepam    Labs Reviewed:  Results from last 7 days   Lab Units 12/16/24  0822 12/15/24  2343   WBC Thousand/uL 13.23* 14.84*   HEMOGLOBIN g/dL 11.3* 14.0   HEMATOCRIT % 37.3 47.6   PLATELETS Thousands/uL 236 281      Results from last 7 days   Lab Units 12/16/24  0822 12/15/24  2343   SODIUM mmol/L 154* 162*   CO2 mmol/L 29 36*   BUN mg/dL 23 27*   CALCIUM mg/dL 9.2 10.5*   MAGNESIUM mg/dL 2.6 2.7   PHOSPHORUS mg/dL 3.2  --          Invalid input(s): \"ASTSGOT\", \"ALTSGPT\"LABRCNTIP@ ,alkphos:3,tbilirubin:3,dbilirubin:3)@  Results from last 7 days   Lab Units 12/15/24  2343   INR  1.19           Invalid input(s): \"TROPT\", \"PBNP\"             I have personally seen and examined the patient on (12/16/24 between 5896-0020). I discussed the patient with the AP/resident including, but not limited to, verifying findings; reviewing labs and x-rays; discussing with consultants; developing the plan of care with the bedside nurse; and discussing treatment plan with patient or surrogate.  I have reviewed the note and assessment performed by the AP/resident and agree with the AP/resident’s documented findings and " plan of care with the above additions/exceptions. Please see my comments for details and adjustments.

## 2024-12-16 NOTE — ASSESSMENT & PLAN NOTE
Presents with sodium of 162  Per chart review appears patient was on p.o. regimen  Repeat sodium down to 154  Goal sodium 150-152  Obtain repeat BMP this afternoon

## 2024-12-16 NOTE — SEPSIS NOTE
"  Sepsis Note   Hemanth Swanson 39 y.o. male MRN: [id number]  Unit/Bed#: ED 09 Encounter: [id number]    On reassessment, patient received 1 L Isolyte bolus.  Normotensive at this time.  Repeat lactic improved and normalized.     Initial Sepsis Screening       Row Name 12/16/24 0026                Is the patient's history suggestive of a new or worsening infection? Yes (Proceed)  -CW        Suspected source of infection suspect infection, source unknown;pneumonia  -CW        Indicate SIRS criteria Tachycardia > 90 bpm;Leukocytosis (WBC > 60064 IJL) OR Leukopenia (WBC <4000 IJL) OR Bandemia (WBC >10% bands)  -CW        Are two or more of the above signs & symptoms of infection both present and new to the patient? Yes (Proceed)  -CW        Assess for evidence of organ dysfunction: Are any of the below criteria present within 6 hours of suspected infection and SIRS criteria that are NOT considered to be chronic conditions? Lactate > 2.0  -CW        Date of presentation of severe sepsis 12/16/24  -CW        Time of presentation of severe sepsis 0027  -CW        Sepsis Note: Click \"NEXT\" below (NOT \"close\") to generate sepsis note based on above information. --                  User Key  (r) = Recorded By, (t) = Taken By, (c) = Cosigned By      Initials Name Provider Type    ERIC Gay MD Physician                    Default Flowsheet Data (Last 720 Hours)       Sepsis Reassess       Row Name 12/16/24 0225                   Repeat Volume Status and Tissue Perfusion Assessment Performed    Date of Reassessment: 12/16/24  -        Time of Reassessment: 0225  -CW        Sepsis Reassessment Note: Click \"NEXT\" below (NOT \"close\") to generate sepsis reassessment note. YES (proceed by clicking \"NEXT\")  -CW        Repeat Volume Status and Tissue Perfusion Assessment Performed --                  User Key  (r) = Recorded By, (t) = Taken By, (c) = Cosigned By      Initials Name Provider Type    ERIC Gay MD " Physician                    Body mass index is 26.14 kg/m².  Wt Readings from Last 1 Encounters:   12/16/24 97.4 kg (214 lb 11.7 oz)        Ideal body weight: 86.8 kg (191 lb 5.7 oz)  Adjusted ideal body weight: 91 kg (200 lb 11.3 oz)

## 2024-12-16 NOTE — PROGRESS NOTES
ADT alert received. Chart reviewed.  Hemanth was admitted to [facility] with sepsis. I will follow up at discharge.

## 2024-12-16 NOTE — RESPIRATORY THERAPY NOTE
RT Ventilator Management Note  Hemanth Swanson 39 y.o. male MRN: [id number]  Unit/Bed#: ED 09 Encounter: [id number]      Daily Screen    No data found in the last 10 encounters.           Physical Exam:   O2 Device: Vent      Resp Comments: (P) Called to the ER to assist with trach and vent. Patient has his own vent from home, patient suctioned, skin under the trach strap is red,  inner cannual changed and trach straps changed. patient is currently on his own Trilogy vent actual PIP 21.9,  RR 14 min 6.9 MAP 11.1 PIF 30.5 I:E ratio 1:2.6 Leak 30.4. Nighttime setting vt 500/ PEEP 8 Breath Rate 14 tigger setting 3L. Alarms 11/90 insp pressure, VT off, minvent 1.0, RR 60

## 2024-12-16 NOTE — ED NOTES
Nurse to nurse report attempted to be called to receiving facility, [facility] ICU 05, at . No answer after 4+ calls. Patient on route to facility, via transport.

## 2024-12-17 LAB
ANION GAP SERPL CALCULATED.3IONS-SCNC: 4 MMOL/L (ref 4–13)
ANION GAP SERPL CALCULATED.3IONS-SCNC: 7 MMOL/L (ref 4–13)
ATRIAL RATE: 53 BPM
BASOPHILS # BLD AUTO: 0.03 THOUSANDS/ÂΜL (ref 0–0.1)
BASOPHILS NFR BLD AUTO: 0 % (ref 0–1)
BUN SERPL-MCNC: 17 MG/DL (ref 5–25)
BUN SERPL-MCNC: 18 MG/DL (ref 5–25)
CALCIUM SERPL-MCNC: 8.7 MG/DL (ref 8.4–10.2)
CALCIUM SERPL-MCNC: 8.9 MG/DL (ref 8.4–10.2)
CHLORIDE SERPL-SCNC: 116 MMOL/L (ref 96–108)
CHLORIDE SERPL-SCNC: 117 MMOL/L (ref 96–108)
CO2 SERPL-SCNC: 30 MMOL/L (ref 21–32)
CO2 SERPL-SCNC: 30 MMOL/L (ref 21–32)
CREAT SERPL-MCNC: 0.66 MG/DL (ref 0.6–1.3)
CREAT SERPL-MCNC: 0.67 MG/DL (ref 0.6–1.3)
EOSINOPHIL # BLD AUTO: 0.06 THOUSAND/ÂΜL (ref 0–0.61)
EOSINOPHIL NFR BLD AUTO: 1 % (ref 0–6)
ERYTHROCYTE [DISTWIDTH] IN BLOOD BY AUTOMATED COUNT: 15.3 % (ref 11.6–15.1)
GFR SERPL CREATININE-BSD FRML MDRD: 122 ML/MIN/1.73SQ M
GFR SERPL CREATININE-BSD FRML MDRD: 123 ML/MIN/1.73SQ M
GLUCOSE SERPL-MCNC: 102 MG/DL (ref 65–140)
GLUCOSE SERPL-MCNC: 88 MG/DL (ref 65–140)
HCT VFR BLD AUTO: 36 % (ref 36.5–49.3)
HGB BLD-MCNC: 11 G/DL (ref 12–17)
IMM GRANULOCYTES # BLD AUTO: 0.04 THOUSAND/UL (ref 0–0.2)
IMM GRANULOCYTES NFR BLD AUTO: 0 % (ref 0–2)
LYMPHOCYTES # BLD AUTO: 1.35 THOUSANDS/ÂΜL (ref 0.6–4.47)
LYMPHOCYTES NFR BLD AUTO: 12 % (ref 14–44)
MAGNESIUM SERPL-MCNC: 2.4 MG/DL (ref 1.9–2.7)
MCH RBC QN AUTO: 30.1 PG (ref 26.8–34.3)
MCHC RBC AUTO-ENTMCNC: 30.6 G/DL (ref 31.4–37.4)
MCV RBC AUTO: 99 FL (ref 82–98)
MONOCYTES # BLD AUTO: 0.59 THOUSAND/ÂΜL (ref 0.17–1.22)
MONOCYTES NFR BLD AUTO: 5 % (ref 4–12)
MRSA NOSE QL CULT: NORMAL
NEUTROPHILS # BLD AUTO: 9.45 THOUSANDS/ÂΜL (ref 1.85–7.62)
NEUTS SEG NFR BLD AUTO: 82 % (ref 43–75)
NRBC BLD AUTO-RTO: 0 /100 WBCS
P AXIS: 26 DEGREES
PHOSPHATE SERPL-MCNC: 2.2 MG/DL (ref 2.7–4.5)
PLATELET # BLD AUTO: 206 THOUSANDS/UL (ref 149–390)
PMV BLD AUTO: 10.4 FL (ref 8.9–12.7)
POTASSIUM SERPL-SCNC: 3.6 MMOL/L (ref 3.5–5.3)
POTASSIUM SERPL-SCNC: 3.6 MMOL/L (ref 3.5–5.3)
PR INTERVAL: 128 MS
PROCALCITONIN SERPL-MCNC: 0.32 NG/ML
QRS AXIS: 90 DEGREES
QRSD INTERVAL: 94 MS
QT INTERVAL: 396 MS
QTC INTERVAL: 371 MS
RBC # BLD AUTO: 3.65 MILLION/UL (ref 3.88–5.62)
SODIUM SERPL-SCNC: 151 MMOL/L (ref 135–147)
SODIUM SERPL-SCNC: 153 MMOL/L (ref 135–147)
T WAVE AXIS: -4 DEGREES
VENTRICULAR RATE: 53 BPM
WBC # BLD AUTO: 11.52 THOUSAND/UL (ref 4.31–10.16)

## 2024-12-17 PROCEDURE — 93010 ELECTROCARDIOGRAM REPORT: CPT | Performed by: INTERNAL MEDICINE

## 2024-12-17 PROCEDURE — 80048 BASIC METABOLIC PNL TOTAL CA: CPT

## 2024-12-17 PROCEDURE — 94150 VITAL CAPACITY TEST: CPT

## 2024-12-17 PROCEDURE — 84145 PROCALCITONIN (PCT): CPT

## 2024-12-17 PROCEDURE — 99291 CRITICAL CARE FIRST HOUR: CPT | Performed by: STUDENT IN AN ORGANIZED HEALTH CARE EDUCATION/TRAINING PROGRAM

## 2024-12-17 PROCEDURE — 94668 MNPJ CHEST WALL SBSQ: CPT

## 2024-12-17 PROCEDURE — 94664 DEMO&/EVAL PT USE INHALER: CPT

## 2024-12-17 PROCEDURE — 94640 AIRWAY INHALATION TREATMENT: CPT

## 2024-12-17 PROCEDURE — 83735 ASSAY OF MAGNESIUM: CPT

## 2024-12-17 PROCEDURE — 94669 MECHANICAL CHEST WALL OSCILL: CPT

## 2024-12-17 PROCEDURE — 94760 N-INVAS EAR/PLS OXIMETRY 1: CPT

## 2024-12-17 PROCEDURE — 84100 ASSAY OF PHOSPHORUS: CPT

## 2024-12-17 PROCEDURE — 94003 VENT MGMT INPAT SUBQ DAY: CPT

## 2024-12-17 PROCEDURE — NC001 PR NO CHARGE: Performed by: STUDENT IN AN ORGANIZED HEALTH CARE EDUCATION/TRAINING PROGRAM

## 2024-12-17 PROCEDURE — 85025 COMPLETE CBC W/AUTO DIFF WBC: CPT

## 2024-12-17 RX ORDER — ACETAMINOPHEN 10 MG/ML
1000 INJECTION, SOLUTION INTRAVENOUS EVERY 6 HOURS PRN
Status: DISCONTINUED | OUTPATIENT
Start: 2024-12-17 | End: 2024-12-18

## 2024-12-17 RX ORDER — LIDOCAINE HYDROCHLORIDE 10 MG/ML
INJECTION, SOLUTION EPIDURAL; INFILTRATION; INTRACAUDAL; PERINEURAL
Status: DISPENSED
Start: 2024-12-17 | End: 2024-12-18

## 2024-12-17 RX ADMIN — ALBUTEROL SULFATE 2.5 MG: 2.5 SOLUTION RESPIRATORY (INHALATION) at 07:16

## 2024-12-17 RX ADMIN — CHLORHEXIDINE GLUCONATE 15 ML: 1.2 RINSE ORAL at 20:16

## 2024-12-17 RX ADMIN — POTASSIUM PHOSPHATE, MONOBASIC AND POTASSIUM PHOSPHATE, DIBASIC 30 MMOL: 224; 236 INJECTION, SOLUTION, CONCENTRATE INTRAVENOUS at 07:37

## 2024-12-17 RX ADMIN — LORAZEPAM 2 MG: 2 INJECTION INTRAMUSCULAR; INTRAVENOUS at 16:25

## 2024-12-17 RX ADMIN — CHLORHEXIDINE GLUCONATE 15 ML: 1.2 RINSE ORAL at 08:52

## 2024-12-17 RX ADMIN — ACETYLCYSTEINE 600 MG: 200 SOLUTION ORAL; RESPIRATORY (INHALATION) at 07:17

## 2024-12-17 RX ADMIN — VANCOMYCIN HYDROCHLORIDE 2000 MG: 10 INJECTION, POWDER, LYOPHILIZED, FOR SOLUTION INTRAVENOUS at 13:14

## 2024-12-17 RX ADMIN — ALBUTEROL SULFATE 2.5 MG: 2.5 SOLUTION RESPIRATORY (INHALATION) at 23:00

## 2024-12-17 RX ADMIN — ALBUTEROL SULFATE 2.5 MG: 2.5 SOLUTION RESPIRATORY (INHALATION) at 15:16

## 2024-12-17 RX ADMIN — ACETYLCYSTEINE 600 MG: 200 SOLUTION ORAL; RESPIRATORY (INHALATION) at 23:00

## 2024-12-17 RX ADMIN — LORAZEPAM 2 MG: 2 INJECTION INTRAMUSCULAR; INTRAVENOUS at 05:03

## 2024-12-17 RX ADMIN — ACETYLCYSTEINE 600 MG: 200 SOLUTION ORAL; RESPIRATORY (INHALATION) at 15:16

## 2024-12-17 RX ADMIN — LORAZEPAM 2 MG: 2 INJECTION INTRAMUSCULAR; INTRAVENOUS at 23:44

## 2024-12-17 RX ADMIN — APIXABAN 5 MG: 5 TABLET, FILM COATED ORAL at 08:52

## 2024-12-17 NOTE — PROGRESS NOTES
Hemanth Swanson is a 39 y.o. male who is currently ordered Vancomycin IV with management by the Pharmacy Consult service.  Relevant clinical data and objective / subjective history reviewed.  Vancomycin Assessment:  Indication and Goal AUC/Trough: Bacteremia (goal -600, trough >10), -600, trough >10  Clinical Status: stable  Micro:     Renal Function:  SCr: 0.66 mg/dL  CrCl: 186.8 mL/min  Renal replacement: Not on dialysis  Days of Therapy: 1  Current Dose: 2000 mg IV x 1 dose  Vancomycin Plan:  New Dosin mg IV q12h  Estimated AUC: 495 mcg*hr/mL  Estimated Trough: 11.7 mcg/mL  Next Level: 24 at 0600  Renal Function Monitoring: Daily BMP and UOP  Pharmacy will continue to follow closely for s/sx of nephrotoxicity, infusion reactions and appropriateness of therapy.  BMP and CBC will be ordered per protocol. We will continue to follow the patient’s culture results and clinical progress daily.    Dariela Osullivan, Pharmacist

## 2024-12-17 NOTE — PROGRESS NOTES
Critical Care Attending Note; John Sutherland   Note Date: 24  Note Time: 9:19 AM    Patient: Hemanth Swanson  Age, : 39 y.o., 1985 MRN: [id number] Code Status: Level 1 - Full Code Patient Location: ICU 05/ICU 05   Hospital LOS:1 days  ]   Patient seen and examined, medical record reviewed, discussed with house staff and nursing staff.     HPI   CC: AMS  39M with a PMH Seminoma(Orcetomy/Chemo), HTN, HFpEF, DM, Cardiac Arrest(Hypoxic - ), PE, Demyelinating Neuromuscular Disorder(Trach/Peg), recurrent PEG tube dislodgement who presents due to agitation found to have hypernatremia.       Main ICU Plans:       #Neuro  Demyelinating NM disorder/Agitation - Baseline able to eat  - Delirium precautions  - PRN Ativan/Haldol    #Card  Bradycardia - likely secondary to vagaling   - Tele     #Pulm  Respiratory Failure - Mucous plugging - Low utility of bronchoscopy at this time, repeat Ct imaging shows scarred lower lobes.   - Aggressive Pulm hygiene regimen - mucomyst, albuterol, chest PT  - LTVV VC - 6-8cc/kg IDBW - TCT daily  - Wean FiO2 - Maintain Oxygen Sat >92%  - VAP Bundle  - HOB > 30o    - PEEP Titrate      Pulmonary embolism  - Hold due to hematuria    #Renal  Hypernatremia -   - D5W  - Trend BMP    Hematuria - Likely traumatic from puga  - Monitor    #GI  Bowel regimn    #ID   Bronchitis - Lower concerns for active infection, leukocytosis likely from hemoconcentration. If secretions do not improve then a short course of antibiotics  - Follow up infectious workup      #DVT/GI ppx  SCD    #Lines/Tubes/Drains:   Invasive Devices       Central Venous Catheter Line  Duration             Port A Cath 23 Right Subclavian 644 days              Peripheral Intravenous Line  Duration             Peripheral IV 12/15/24 Distal;Right;Ventral (anterior) Forearm 1 day    Peripheral IV 24 Left;Proximal;Ventral (anterior) Forearm 1 day    Peripheral IV 24 Proximal;Right;Upper;Ventral (anterior)  Arm 1 day              Drain  Duration             Gastrostomy/Enterostomy Percutaneous Interventional Gastrostomy 18 Fr.  days    Urethral Catheter Temperature probe 16 Fr. 1 day              Airway  Duration             Surgical Airway Shiley Distal 47 days                    #Nutrition:   Diet NPO        #Code Status:   Level 1 - Full Code    #Dispo:   ICU        John Sutherland MD  Pulmonary, Critical Care    Critical care time, excluding procedures, teaching, family meetings, and excludes any prior time recorded by the AP/resident, 35 minutes. Upon my evaluation, this patient has a high probability of imminent or life-threatening deterioration due to above problems which required my direct attention, intervention, and personal management.   Impression/Active Problems:     HTN   HFpEF   DM   Neuromuscualr disorder   Tracheostomy   PEG   Hypernatremia   Aspiration   Vaso vagal    Bradycardia  AMS  Bronchitis           Physical Exam:     Vital Signs:   Weight: 94.1 kg (207 lb 7.3 oz)  IBW: Ideal body weight: 86.8 kg (191 lb 5.7 oz)  Adjusted ideal body weight: 89.7 kg (197 lb 12.7 oz)  Temp:  [96.6 °F (35.9 °C)-99 °F (37.2 °C)] 98.1 °F (36.7 °C)  HR:  [51-97] 51  BP: ()/(52-83) 104/63  Resp:  [14-51] 14  SpO2:  [95 %-100 %] 100 %  O2 Device: Ventilator  FiO2 (%):  [40] 40  Physical Exam: Unchanged  General: NAD  Neuro: Minimal interaction  Heart: RRR  Lungs: Non labored  Abdomen: Soft  Extremities: Min edema                Ventilator Settings:    FiO2 (%):  [40] 40    Results from last 7 days   Lab Units 12/15/24  2350   PO2 SYLVESTER mm Hg 58.8*     Radiologic Images Reviewed:    CT C/A/P  1.  Complete collapse of the left lower lobe and severe atelectasis of the right lower lobe similar to prior examination. Superimposed aspiration or pneumonia cannot be excluded. Trace left pleural effusion.   2.  2 mm calculus in the distal right ureter just prior to the ureterovesicular junction. No significant  "hydronephrosis.   3.  Mild thickening of the urinary bladder wall, correlate with urinalysis for cystitis.     CT head  No acute intracranial hemorrhage, midline shift, or mass effect.   Input / Output:     Intake/Output Summary (Last 24 hours) at 12/17/2024 0919  Last data filed at 12/17/2024 0600  Gross per 24 hour   Intake 1729.99 ml   Output 625 ml   Net 1104.99 ml            Infusions:       Scheduled Medications:  Current Facility-Administered Medications   Medication Dose Route Frequency Provider Last Rate    acetaminophen  1,000 mg Intravenous Q6H PRN  Jefry Tricia, CRNP      acetylcysteine  3 mL Nebulization Q8H  [place] Tricia, CRNP      albuterol  2.5 mg Nebulization Q8H  [place] Tricia, CRNP      apixaban  5 mg Per PEG Tube BID  [place] Tricia, CRNP      chlorhexidine  15 mL Mouth/Throat Q12H ISAEL  [place] Tricia, CRNP      LORazepam  2 mg Intravenous Q4H PRN  Jefry Tricia, CRNP      potassium phosphate  30 mmol Intravenous Once Margi Cobb, CRNP 30 mmol (12/17/24 0737)       PRN Medications:    acetaminophen    LORazepam    Labs Reviewed:  Results from last 7 days   Lab Units 12/17/24  0441 12/16/24  0822 12/15/24  2343   WBC Thousand/uL 11.52* 13.23* 14.84*   HEMOGLOBIN g/dL 11.0* 11.3* 14.0   HEMATOCRIT % 36.0* 37.3 47.6   PLATELETS Thousands/uL 206 236 281      Results from last 7 days   Lab Units 12/17/24  0441 12/17/24  0009 12/16/24  1700 12/16/24  0822 12/15/24  2343   SODIUM mmol/L 153* 151* 155* 154* 162*   CO2 mmol/L 30 30 29 29 36*   BUN mg/dL 17 18 21 23 27*   CALCIUM mg/dL 8.9 8.7 8.8 9.2 10.5*   MAGNESIUM mg/dL 2.4  --   --  2.6 2.7   PHOSPHORUS mg/dL 2.2*  --   --  3.2  --          Invalid input(s): \"ASTSGOT\", \"ALTSGPT\"LABRCNTIP@ ,alkphos:3,tbilirubin:3,dbilirubin:3)@  Results from last 7 days   Lab Units 12/15/24  2343   INR  1.19           Invalid input(s): \"TROPT\", \"PBNP\"             I have personally seen and examined the patient on " (12/17/24 between [id number]). I discussed the patient with the AP/resident including, but not limited to, verifying findings; reviewing labs and x-rays; discussing with consultants; developing the plan of care with the bedside nurse; and discussing treatment plan with patient or surrogate.  I have reviewed the note and assessment performed by the AP/resident and agree with the AP/resident’s documented findings and plan of care with the above additions/exceptions. Please see my comments for details and adjustments.

## 2024-12-17 NOTE — ASSESSMENT & PLAN NOTE
Possibly secondary to traumatic Sheppard insertion?  Will trial fluid bolus   Consider holding Eliquis if hematuria persists

## 2024-12-17 NOTE — PROGRESS NOTES
Progress Note - Critical Care/ICU   Name: Hemanth Swanson 39 y.o. male I MRN: [id number]  Unit/Bed#: ICU 05 I Date of Admission: 12/16/2024   Date of Service: 12/17/2024 I Hospital Day: 1      Assessment & Plan  Toxic metabolic encephalopathy  Patient with history of waxing and waning agitation   Required 8 mg IM Versed + 2 mg IV versed at SLE  CT negative  UDS + THC, benzos   MRI during previous admission revealed images consistent with anoxic brain injury    Plan:  PRN ativan for agitation   Limit sedatives that can cause bradycardia; as patient has history of vagal/bradycardiac events   Delirium precautions  Infectious work-up   Correct sodium as outlined   Acute on chronic respiratory failure with hypoxia and hypercapnia (HCC)  S/p trach for NM disease   Trach 6XL  History of significant mucous plugs  CT chest revealing Complete collapse of the left lower lobe and severe atelectasis of the right lower lobe similar to prior examination. Superimposed aspiration or pneumonia cannot be excluded. Trace left pleural effusion.     Plan:  Agressive pulmonary hygiene   Mucomyst, albuterol  Chest therapy  Frequent suctioning/turning  Monitor off antibiotics  Attempt trach collar pending mentation  SIRS (systemic inflammatory response syndrome) (HCC)  As evidenced by leukocytosis and lactic acidosis  Follow-up blood cultures  Follow-up urine antigens  Obtain sputum culture  Monitor off antibiotics  Hypernatremia  Presents with sodium of 162  Per chart review appears patient was on p.o. 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  Will trial fluid bolus   Consider holding Eliquis if hematuria persists  Disposition: Critical care    ICU Core Measures     Vented Patient  VAP Bundle  VAP bundle ordered     A: Assess, Prevent, and Manage Pain Has pain been assessed? No  Need for changes to pain regimen? No   B: Both Spontaneous Awakening Trials (SATs) and Spontaneous Breathing Trials (SBTs) Plan to perform spontaneous awakening trial today? Yes   Plan to perform spontaneous breathing trial today? Yes   Obvious barriers to extubation? Yes   C: Choice of Sedation RASS Goal: 0 Alert and Calm  Need for changes to sedation or analgesia regimen? No   D: Delirium CAM-ICU: Positive   E: Early Mobility  Plan for early mobility? NA   F: Family Engagement Plan for family engagement today? Yes       Review of Invasive Devices:    Sheppard Plan: Continue for accurate I/O monitoring for 48 hours  Central access plan:  port o cath in place      Prophylaxis:  VTE VTE covered by:  apixaban, Per PEG Tube, 5 mg at 12/16/24 1726       Stress Ulcer  not ordered         24 Hour Events : Yesterday, presented to [facility] from [facility] for acute agitation.  Patient has a history of neuromuscular disease requiring tracheostomy and PEG tube placement.  CT showed complete collapse of the left lower lobe and severe atelectasis of the right lower lobe, therefore he was placed on the ventilator.  Noted to have severe electrolyte abnormalities which were corrected with supplementation.  Hypernatremia was treated with dextrose drip.  Overnight, sodium corrected to 151, dextrose drip was discontinued.  He received Ativan x 1 for agitation.  Hematuria was noted in Sheppard catheter.       Subjective   Review of Systems: Review of Systems   Unable to perform ROS: Intubated        Objective :                   Vitals I/O      Most Recent Min/Max in 24hrs   Temp 98.2 °F (36.8 °C) Temp  Min: 96.6 °F (35.9 °C)  Max: 99 °F (37.2 °C)   Pulse 65 Pulse  Min: 54  Max: 94   Resp (!) 40 Resp  Min: 14  Max: 71   BP 97/56 BP  Min: 89/55  Max: 138/64   O2 Sat 98 % SpO2  Min: 93 %  Max: 100 %      Intake/Output Summary (Last 24 hours) at 12/17/2024 0116  Last data filed at 12/17/2024 0011  Gross per 24 hour   Intake 1564.16 ml   Output 425 ml   Net 1139.16 ml       Diet NPO    Invasive Monitoring           Physical Exam   Physical Exam  Vitals and nursing note reviewed.   Skin:     General: Skin is warm and dry.      Capillary Refill: Capillary refill takes 2 to 3 seconds.      Coloration: Skin is pale.      Findings: Wound present.   HENT:      Head: Normocephalic.      Mouth/Throat:      Mouth: Mucous membranes are moist.   Cardiovascular:      Rate and Rhythm: Normal rate and regular rhythm.      Pulses: Normal pulses.      Heart sounds: Normal heart sounds.   Musculoskeletal:      Right lower leg: Trace Edema present.      Left lower leg: Trace Edema present.   Abdominal: General: There is abdominal type feeding tube.     Palpations: Abdomen is soft.   Constitutional:       General: He is not in acute distress.     Appearance: He is ill-appearing.      Interventions: He is intubated and restrained.   Pulmonary:      Effort: He is intubated.      Breath sounds: Rhonchi present.   Neurological:      Mental Status: He is CAM ICU positive  and calm.   Genitourinary/Anorectal:  Sheppard present.        Diagnostic Studies        Lab Results: I have reviewed the following results:     Medications:  Scheduled PRN   acetylcysteine, 3 mL, Q8H  albuterol, 2.5 mg, Q8H  apixaban, 5 mg, BID  chlorhexidine, 15 mL, Q12H ISAEL      LORazepam, 2 mg, Q4H PRN       Continuous          Labs:   CBC    Recent Labs     12/15/24  2343 12/16/24  0822   WBC 14.84* 13.23*   HGB 14.0 11.3*   HCT 47.6 37.3    236      BMP    Recent Labs     12/16/24  1700 12/17/24  0009   SODIUM 155* 151*   K 2.8* 3.6   * 117*   CO2 29 30   AGAP 9 4   BUN 21 18   CREATININE 0.72 0.67   CALCIUM 8.8 8.7       Coags    Recent Labs     12/15/24  2343   INR 1.19   PTT 28        Additional Electrolytes  Recent Labs     12/15/24  2343 12/16/24  0822   MG 2.7 2.6   PHOS  --  3.2   CAIONIZED  --  1.14          Blood Gas    No recent results  Recent Labs     12/15/24  2350   PHVEN 7.318   VIG8PAK 53.5*   PO2VEN 58.8*   HGK3FFZ 26.8   BEVEN -0.1   G0RFBXA 88.8*    LFTs  Recent Labs     12/15/24  2343 12/16/24  0822   ALT 33 24   AST 17 18   ALKPHOS 115* 95   ALB 4.3 3.7   TBILI 0.38 0.48       Infectious  Recent Labs     12/15/24  2343 12/16/24  0822   PROCALCITONI 0.07 0.11     Glucose  Recent Labs     12/15/24  2343 12/16/24  0822 12/16/24  1700 12/17/24  0009   GLUC 83 75 108 102

## 2024-12-17 NOTE — ASSESSMENT & PLAN NOTE
Presents with sodium of 162  Per chart review appears patient was on p.o. regimen  Repeat sodium down to 154  Goal sodium 150-152  Continue D5W  Initiate free water flushes when tube feeds resumed  Trend BMP every 6

## 2024-12-17 NOTE — WOUND OSTOMY CARE
Progress Note - Wound   Hemanth Swanson 39 y.o. male MRN: [id number]  Unit/Bed#: ICU 05 Encounter: [id number]        Assessment:   This is a 39 year old male patient admitted on 12/16/24 with toxic metabolic encephalopathy. As per staff RN, patient is vented and was restrained due to becoming extremely combative. Wound care orders written remotely based on photos taken on admission.    Assessment Findings:  1-Partial thickness wounds to left posterior hand with pink non-granulating tissue - orders in place for wound care.  2-Full thickness wound to right medial foot with dry brown eschar and yellow slough - orders in place for wound care.  3-Partial thickness wound to right heel with pink non-granulating tissue - orders in place for wound care.  4-Full thickness wound to right anterior knee with pink granulation tissue and dry black eschar - orders in place for wound care.  5-Moisture associated skin damage to bilateral groin with serosanguinous drainage noted on photo - orders in place for wound care.    Wound Care Plan:  1-Cleanse wound to right knee, right heel and left hand with NSS & pat dry. Apply thin layer of Normlgel to Adaptic cut to size of wounds then apply Adaptic to wounds. Cover with ABD, rolled gauze and tape. May apply small foam dressing to right knee. Change every other day & prn soilage/dislodgement.  2-Cleanse wound to right medial foot with NSS & pat dry. Apply Betadine to wound, cover with ABD pad, rolled gauze and tape. Change every other day & prn soilage/dislodgement.  3-Cleanse wounds to bilateral groin with NSS or foaming cleanser & pat dry. Apply ABD pads and gentle paper tape. Change daily & prn soilage/dislodgement.  4-Apply Prevent barrier cream to bilateral sacrum, buttock and heels BID and PRN  5-Heel lift boots to be worn to bilateral heels at all times in bed and after transfer to reclining chair if able. If patient unable to tolerate, must float heels on 2 pillows to offload  pressure so heels are not in contact with mattress or pillows.  6-Ehob pressure redistribution cushion in chair when out of bed if able. Avoid prolonged sitting.  7-Moisturize skin daily with skin nourishing cream.  8-Turn/reposition q2h or when medically stable for pressure re-distribution on skin.     Wound 12/16/24 Hand Left;Posterior (Active)   Wound Image   12/16/24 0734   Wound 12/16/24 Foot Right;Medial (Active)   Wound Image   12/16/24 0734   Wound 12/16/24 Heel Right (Active)   Wound Image   12/16/24 0735   Wound 12/16/24 Knee Anterior;Right (Active)   Wound Image   12/16/24 0736     Wound 12/16/24 MASD Groin (Active)        Discussed assessment findings, and plan of care/recommendations with Hector JEONG via Secure Chat.    Wound care will follow along with patient throughout admission, please call or text with questions and concerns.    Recommendations written as orders.  Irma David MSN, RN, CWON

## 2024-12-17 NOTE — CASE MANAGEMENT
Case Management Discharge Planning Note    Patient name Hemanth Swanson  Location ICU 05/ICU 05 MRN [id number]  : 1985 Date 2024       Current Admission Date: 2024  Current Admission Diagnosis:Toxic metabolic encephalopathy   Patient Active Problem List    Diagnosis Date Noted Date Diagnosed    History of pulmonary embolus (PE) 2024     Hematuria 2024     Rash of back 10/25/2024     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HCC) 10/17/2024     Palliative care by specialist 10/11/2024     Goals of care, counseling/discussion 10/11/2024     Toxic metabolic encephalopathy 10/07/2024     Hypernatremia 10/06/2024     Cardiac arrest due to other underlying condition ([facility]) 2024     Seizure-like activity ([facility]) 2024     Bradycardia 2024     Ventilator dependent ([facility]) 2024     Obesity hypoventilation syndrome ([facility]) 10/24/2023     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     Psychophysiological insomnia 10/18/2023     Impaired mobility 2023     Status post tracheostomy ([facility]) 2023     S/P percutaneous endoscopic gastrostomy (PEG) tube placement ([facility]) 2023     Anxiety 2023     Acute on chronic respiratory failure with hypoxia and hypercapnia ([facility]) 2023     Sepsis ([facility]) 2023     Acute pulmonary embolism without acute cor pulmonale ([facility]) 2023     Depression 2023     SIRS (systemic inflammatory response syndrome) ([facility]) 2023     Hypokalemia 2023     History of LVH (left ventricular hypertrophy) 2023     Pure hypertriglyceridemia 2023     Unilateral vestibular schwannoma ([facility]) 2023     Port-A-Cath in place 2023     Diplopia 2023     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[facility]) 2023     Umbilical hernia without obstruction and without gangrene 12/10/2022     Tobacco use 12/10/2022     Retroperitoneal lymphadenopathy 12/10/2022       LOS (days): 1  Geometric Mean LOS (GMLOS) (days):   Days to GMLOS:  Pt told to go to lab and have labs completed.     OBJECTIVE:  Risk of Unplanned Readmission Score: 30.62         Current admission status: Inpatient   Preferred Pharmacy:   [facility]/pharmacy #[id number] - [place] - [address]  [address] [place] [address]  Phone: [phone] Fax: [phone]    Primary Care Provider: Ela Douglas MD    Primary Insurance: [facility]  Secondary Insurance:     DISCHARGE DETAILS:    Additional Comments: CM left VM for pt's sister, Dmitry, requesting return pc to complete CM assessment and start discharge planning discussion.

## 2024-12-17 NOTE — DISCHARGE INSTR - OTHER ORDERS
Wound Care Plan:  1-Cleanse wound to left hand with normal saline or mild soap and water & pat dry. Apply betadine to wound, cover with ABD, rolled gauze and tape. Change 3x/week & as needed for soilage/dislodgement.  2-Cleanse wound to right posterior arm with normal saline or mild soap and water & pat dry. Apply betadine to wound daily (not necessary to apply cover dressing).  3-Cleanse unstageable pressure injury to left outer ear with normal saline & pat dry. Take 4x4 bordered foam dressing and cut to size of ear. Apply to outer ear wound. Change 3x/week and prn soilage/dislodgement.  4-Apply Prevent barrier cream or equivalent to bilateral sacrobuttocks, right medial foot healed wound and heels 2x/day and as needed.  5-Heel lift boots to be worn to bilateral heels at all times in bed and after transfer to reclining chair if able. If patient unable to tolerate, must float heels on 2 pillows to offload pressure so heels are not in contact with mattress or pillows.  6-Use pressure redistribution cushion in chair when out of bed if able. Avoid prolonged sitting.  7-Moisturize skin daily with skin nourishing cream.  8-Turn/reposition every 2 hrs in bed and every hour when out of bed to chair for pressure re-distribution on skin.   9-If wounds worsen or fail to heal, patient may follow up at [facility]. Call Central Scheduling for appointment: [phone].

## 2024-12-17 NOTE — UTILIZATION REVIEW
Initial Clinical Review    Admission: Date/Time/Statement:   Admission Orders (From admission, onward)       Ordered        12/16/24 0758  Inpatient Admission  Once                          Orders Placed This Encounter   Procedures    Inpatient Admission     Standing Status:   Standing     Number of Occurrences:   1     Level of Care:   Critical Care [15]     Estimated length of stay:   More than 2 Midnights     Certification:   I certify that inpatient services are medically necessary for this patient for a duration of greater than two midnights. See H&P and MD Progress Notes for additional information about the patient's course of treatment.     Initial Presentation: 39 y.o. male to [facility] ICU via EMS from  [facility] ED .    Admitted to inpatient with Dx: Toxic Metabolic Encephalopathy.  Presented to ED @ [facility] patient pulled out his tracheostomy at home and is more agitated per family.    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  Hypernatremia.  Migration of percutaneous endoscopic gastrostomy (PEG) tube.  Bipolar.   Baseline Nonverbal.    Date: 12/16/2024   On exam: In acute distress. He is ill-appearing, toxic-appearing and diaphoretic.  Adult male lying in bed, tracheostomy (for NM disease) present and vent dependent, nonverbal, appears acutely ill and diaphoretic, pallor, in moderate distress due to symptoms.  Tracheostomy present with moderate secretions, chronically vent dependent with his baseline tracheostomy.  Diminished breath sounds at the bases on auscultation bilaterally.  No wheezing.  No stridor.  No accessory muscle use. PEG tube present. Site appears clean, dry, and intact.  No active discharge.  Abdomen is soft and nondistended.  Difficult to appreciate any abdominal tenderness, as the patient  is not cooperative with exam, pulling at leads and resisting IV placement, and when I palpate his abdomen in any quadrant he localizes to my hand and pushes away.   Nonverbal at baseline.  Appears agitated, moving extremity spontaneously and resisting lead placement or IV placement.  No focal lateralizing deficits.  Imaging shows CT head: No acute intracranial hemorrhage, midline shift, or mass effect. ED treatment The patient on arrival is agitated, and pulling at leads and resisting attempts at IV placement, thus requiring sedation with Versed 8 mg IM (appropriate weight-based dose). Differential diagnosis includes but is not limited to metabolic encephalopathy, pneumonia, sepsis, UTI, pyelonephritis, bowel obstruction, anemia, and/or electrolyte disturbance. Labs and imaging ordered. See ED course for independent interpretation of results. Leukocytosis 14.84 as well as lactic acidosis 3.1. Meeting sepsis criteria, sepsis alert called. IV fluids and antibiotics ordered. Imaging reveals evidence of pneumonia as well as a 2 mm calculus in the distal right ureter just prior to the UVJ. UA however does not appear consistent with a bacterial UTI and I suspect that the source of infection is the patient's pneumonia, causing his septic presentation and toxic metabolic encephalopathy. Patient has hypernatremia, which he has also had in the past. Discussed with nephrology on-call, Dr. Kerns, regarding the patient's hypernatremia and recommendations for IV fluids. She is recommending starting at D5W 100 mL/hr. Request placed through PACS to discussed with critical care for transfer.  Plan includes Admit to Critical care.  Cardio-Pulmonary monitoring.  PRN ativen.  Delirium precautions.  Infectious work up.  Correction of Na level.  Repeat sodium down to 154.  Goal sodium 150-152.  Continue D5W.  Initiate free water flushes when tube feeds resumedAggressive pulmonary hygiene.  Chest PT.  Frequent turning/suctioning.   Monitor off Abx.    Anticipated Length of Stay/Certification Statement: inpatient services are medically necessary for this patient for a duration of greater than two midnights.     Day 2: 12/17/2024  [facility] for acute agitation.  Patient has a history of neuromuscular disease requiring tracheostomy and PEG tube placement.  CT showed complete collapse of the left lower lobe and severe atelectasis of the right lower lobe, therefore he was placed on the ventilator.  Noted to have severe electrolyte abnormalities which were corrected with supplementation.  Hypernatremia was treated with dextrose drip.  Overnight, sodium corrected to 151, dextrose drip was discontinued.  He received Ativan x 1 for agitation.  Hematuria was noted in Sheppard catheter.    Cardio-Pulmonary monitoring.  UDS+THC & Benzos.  Ativan PRN.  Follow up infectious work up.  Chest PT.  Frequent suctioning / Turning.  Trend BMP q6h.        ED Treatment-Medication Administration - [facility]     dextrose 5 % infusion (100 mL/hr Intravenous New Bag 12/16/24 0334)   midazolam (VERSED) injection 8 mg (8 mg Intramuscular Given 12/15/24 2337)   cefepime (MAXIPIME) IVPB (premix in dextrose) 2,000 mg 50 mL (0 mg Intravenous Stopped 12/16/24 0055)   vancomycin (VANCOCIN) 2,000 mg in sodium chloride 0.9 % 500 mL IVPB (0 mg Intravenous Stopped 12/16/24 0326)   multi-electrolyte (ISOLYTE-S PH 7.4) bolus 1,000 mL (0 mL Intravenous Stopped 12/16/24 0331)   vancomycin (VANCOCIN) 1 g injection **ADS Override Pull** (2,000 mg Intravenous Given 12/16/24 0105)   sterile water injection **ADS Override Pull** (  Given 12/16/24 0106)   midazolam (VERSED) injection 2 mg (2 mg Intravenous Given 12/16/24 0128)   iohexol (OMNIPAQUE) 350 MG/ML injection (SINGLE-DOSE) 100 mL (100 mL Intravenous Given 12/16/24 0155)   midazolam (VERSED) injection 2 mg (2 mg Intravenous Given 12/16/24 0659)           Scheduled  Medications:  acetylcysteine, 3 mL, Nebulization, Q8H  albuterol, 2.5 mg, Nebulization, Q8H  apixaban, 5 mg, Per PEG Tube, BID  chlorhexidine, 15 mL, Mouth/Throat, Q12H ISAEL  potassium phosphate, 30 mmol, Intravenous, Once      Continuous IV Infusions:     PRN Meds:  acetaminophen, 1,000 mg, Intravenous, Q6H PRN  LORazepam, 2 mg, Intravenous, Q4H PRN      ED Triage Vitals   Temperature Pulse Respirations Blood Pressure SpO2 Pain Score   12/16/24 0745 12/16/24 0726 12/16/24 0726 12/16/24 0745 12/16/24 0726 --   98 °F (36.7 °C) 57 15 106/57 97 %      Weight (last 2 days)       Date/Time Weight    12/17/24 0600 94.1 (207.45)    12/16/24 0745 94 (207.23)            Vital Signs (last 3 days)       Date/Time Temp Pulse Resp BP MAP (mmHg) SpO2 FiO2 (%) O2 Device Patient Position - Orthostatic VS [place]born Coma Scale Score    12/17/24 0752 -- 51 14 -- -- 100 % -- -- -- --    12/17/24 0717 -- -- -- -- -- 99 % -- -- -- --    12/17/24 0600 98.1 °F (36.7 °C) 57 27 104/63 79 99 % -- -- -- --    12/17/24 0500 98.4 °F (36.9 °C) 97 28 102/66 80 99 % -- -- -- --    12/17/24 0400 97.9 °F (36.6 °C) 52 29 99/67 80 100 % -- -- -- 12 12/17/24 0338 -- -- -- -- -- 99 % 40 Ventilator -- --    12/17/24 0300 97.9 °F (36.6 °C) 60 24 96/73 82 99 % -- -- -- --    12/17/24 0200 97.9 °F (36.6 °C) 59 42 110/60 80 100 % -- -- -- --    12/17/24 0100 98.1 °F (36.7 °C) 58 41 111/58 80 100 % -- -- -- --    12/17/24 0000 98.2 °F (36.8 °C) 65 40 97/56 71 98 % -- Ventilator -- 12 12/16/24 2330 -- 70 15 -- -- 99 % -- -- -- --    12/16/24 2314 -- 62 14 -- -- 99 % 40 Ventilator -- --    12/16/24 2300 99 °F (37.2 °C) 72 14 89/55 67 99 % -- -- -- --    12/16/24 2200 98.8 °F (37.1 °C) 63 32 116/69 -- 99 % -- -- -- --    12/16/24 2100 98.6 °F (37 °C) 69 40 114/65 85 99 % -- -- -- --    12/16/24 2000 98.8 °F (37.1 °C) 67 37 138/64 92 100 % -- -- -- 12 12/16/24 1930 98.6 °F (37 °C) 74 39 115/75 90 99 % -- -- -- --    12/16/24 1916 -- 85 15 -- -- 98 % 40  Ventilator -- --    12/16/24 1900 97.9 °F (36.6 °C) 69 51 118/65 84 99 % -- -- -- --    12/16/24 1800 97.7 °F (36.5 °C) 69 45 116/66 86 98 % -- -- -- --    12/16/24 1700 97.9 °F (36.6 °C) 63 50 118/65 87 98 % -- -- -- --    12/16/24 1600 97.9 °F (36.6 °C) 89 23 129/83 101 97 % -- T-Piece Lying 10    12/16/24 1541 97.3 °F (36.3 °C) 94 20 125/73 93 98 % -- -- -- --    12/16/24 1400 97.2 °F (36.2 °C) 80 27 114/60 77 96 % -- -- -- --    12/16/24 1300 96.6 °F (35.9 °C) 89 22 92/52 66 95 % -- -- -- --    12/16/24 1200 96.8 °F (36 °C) 79 45 89/53 65 96 % -- -- -- 10    12/16/24 1116 -- -- -- -- -- 97 % -- -- -- --    12/16/24 1000 97.3 °F (36.3 °C) 63 45 90/54 67 96 % -- -- -- --    12/16/24 0902 -- 58 15 -- -- 98 % -- -- -- --    12/16/24 0900 97.7 °F (36.5 °C) 61 20 126/70 92 97 % -- -- -- --    12/16/24 0800 -- -- -- -- -- -- -- -- -- 10    12/16/24 0745 98 °F (36.7 °C) 56 71 106/57 75 93 % -- -- -- --    12/16/24 0726 -- 57 15 -- -- 97 % -- -- -- --              Pertinent Labs/Diagnostic Test Results:   Radiology:  No orders to display     Cardiology:  ECG 12 lead    by Interface, Ris Results In (12/16 3856)        GI:  No orders to display       Results from last 7 days   Lab Units 12/17/24  0441 12/16/24  0822 12/15/24  2343   WBC Thousand/uL 11.52* 13.23* 14.84*   HEMOGLOBIN g/dL 11.0* 11.3* 14.0   HEMATOCRIT % 36.0* 37.3 47.6   PLATELETS Thousands/uL 206 236 281   TOTAL NEUT ABS Thousands/µL 9.45* 10.73* 11.84*     Results from last 7 days   Lab Units 12/17/24  0441 12/17/24  0009 12/16/24  1700 12/16/24  0822 12/15/24  2343   SODIUM mmol/L 153* 151* 155* 154* 162*   POTASSIUM mmol/L 3.6 3.6 2.8* 3.9 4.3   CHLORIDE mmol/L 116* 117* 117* 117* 116*   CO2 mmol/L 30 30 29 29 36*   ANION GAP mmol/L 7 4 9 8 10   BUN mg/dL 17 18 21 23 27*   CREATININE mg/dL 0.66 0.67 0.72 0.65 0.87   EGFR ml/min/1.73sq m 123 122 119 124 110   CALCIUM mg/dL 8.9 8.7 8.8 9.2 10.5*   CALCIUM, IONIZED mmol/L  --   --   --  1.14  --     MAGNESIUM mg/dL 2.4  --   --  2.6 2.7   PHOSPHORUS mg/dL 2.2*  --   --  3.2  --      Results from last 7 days   Lab Units 12/16/24  0822 12/15/24  2343   AST U/L 18 17   ALT U/L 24 33   ALK PHOS U/L 95 115*   TOTAL PROTEIN g/dL 7.3 9.0*   ALBUMIN g/dL 3.7 4.3   TOTAL BILIRUBIN mg/dL 0.48 0.38     Results from last 7 days   Lab Units 12/16/24  1109   POC GLUCOSE mg/dl 90     Results from last 7 days   Lab Units 12/17/24  0441 12/17/24  0009 12/16/24  1700 12/16/24  0822 12/15/24  2343   GLUCOSE RANDOM mg/dL 88 102 108 75 83     Results from last 7 days   Lab Units 12/16/24  0038   OSMOLALITY, SERUM mmol/*     Results from last 7 days   Lab Units 12/15/24  2350   PH SYLVESTER  7.318   PCO2 SYLVESTER mm Hg 53.5*   PO2 SYLVESTER mm Hg 58.8*   HCO3 SYLVESTER mmol/L 26.8   BASE EXC SYLVESTER mmol/L -0.1   O2 CONTENT SYLVESTER ml/dL 16.4   O2 HGB, VENOUS % 88.8*     Results from last 7 days   Lab Units 12/16/24  0157 12/15/24  2343   HS TNI 0HR ng/L  --  3   HS TNI 2HR ng/L <2  --    HSTNI D2 ng/L <-1  --      Results from last 7 days   Lab Units 12/15/24  2343   PROTIME seconds 15.5*   INR  1.19   PTT seconds 28     Results from last 7 days   Lab Units 12/16/24  0157   TSH 3RD GENERATON uIU/mL 2.090     Results from last 7 days   Lab Units 12/17/24  0441 12/16/24  0822 12/15/24  2343   PROCALCITONIN ng/ml 0.32* 0.11 0.07     Results from last 7 days   Lab Units 12/16/24  0822 12/16/24  0157 12/15/24  2343   LACTIC ACID mmol/L 1.2 1.0 3.1*     Results from last 7 days   Lab Units 12/15/24  2343   LIPASE u/L 30     Results from last 7 days   Lab Units 12/16/24  0038   OSMOLALITY, SERUM mmol/*   OSMO UR mmol/     Results from last 7 days   Lab Units 12/16/24  0903 12/16/24  0038 12/16/24  0017   CLARITY UA  Clear  --  Clear   COLOR UA  Yellow  --  Yellow   SPEC GRAV UA  1.025  --  >=1.030   PH UA  5.5  --  6.0   GLUCOSE UA mg/dl Negative  --  Negative   KETONES UA mg/dl Negative  --  Negative   BLOOD UA  Small*  --  3+*   PROTEIN UA mg/dl  30 (1+)*  --  Trace*   NITRITE UA  Negative  --  Negative   BILIRUBIN UA  Negative  --  Negative   UROBILINOGEN UA E.U./dl  --   --  0.2   UROBILINOGEN UA (BE) mg/dl <2.0  --   --    LEUKOCYTES UA  Negative  --  Negative   WBC UA /hpf 1-2  --  4-10*   RBC UA /hpf 2-4  --  20-30*   BACTERIA UA /hpf Occasional  --  Occasional   EPITHELIAL CELLS WET PREP /hpf None Seen  --  Occasional   MUCUS THREADS  Occasional*  --  Occasional*   SODIUM UR   --  114  --    CREATININE UR mg/dL  --  138.0  --      Results from last 7 days   Lab Units 12/16/24  0904   STREP PNEUMONIAE ANTIGEN, URINE  Negative   LEGIONELLA URINARY ANTIGEN  Negative     Results from last 7 days   Lab Units 12/16/24  0038   AMPH/METH  Negative   BARBITURATE UR  Negative   BENZODIAZEPINE UR  Positive*   COCAINE UR  Negative   METHADONE URINE  Negative   OPIATE UR  Negative   PCP UR  Negative   THC UR  Positive*     Results from last 7 days   Lab Units 12/16/24  0822   ETHANOL LVL mg/dL <10     Results from last 7 days   Lab Units 12/16/24  1433 12/16/24  0903 12/16/24  0821 12/15/24  2353 12/15/24  2343   BLOOD CULTURE   --  Received in Microbiology Lab. Culture in Progress. Received in Microbiology Lab. Culture in Progress. Received in Microbiology Lab. Culture in Progress. Received in Microbiology Lab. Culture in Progress.   GRAM STAIN RESULT  3+ Polys*  2+ Gram positive cocci in pairs*  --   --   --   --      Past Medical History:   Diagnosis Date    Anxiety     Cancer ([facility])     Depression     Hypernatremia 10/06/2024    Migration of percutaneous endoscopic gastrostomy (PEG) tube ([facility]) 09/24/2023    Psychiatric disorder     bipolar     Present on Admission:   Toxic metabolic encephalopathy   Acute on chronic respiratory failure with hypoxia and hypercapnia ([facility])   Umbilical hernia without obstruction and without gangrene   SIRS (systemic inflammatory response syndrome) ([facility])   Hypernatremia   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[facility])   Hypokalemia      Admitting  Diagnosis: Sepsis (HCC) [A41.9]  Age/Sex: 39 y.o. male    Network Utilization Review Department  ATTENTION: Please call with any questions or concerns to [phone] and carefully listen to the prompts so that you are directed to the right person. All voicemails are confidential.   For Discharge needs, contact Care Management DC Support Team at [phone] opt. [phone]  Send all requests for admission clinical reviews, approved or denied determinations and any other requests to dedicated fax number below belonging to the campus where the patient is receiving treatment. List of dedicated fax numbers for the Facilities:  FACILITY NAME UR FAX NUMBER   ADMISSION DENIALS (Administrative/Medical Necessity) [phone]   DISCHARGE SUPPORT TEAM (NETWORK) [phone]   PARENT CHILD HEALTH (Maternity/NICU/Pediatrics) [phone]   [facility] [phone]   [facility] [phone]   [facility] [phone]   [facility] [phone]   [facility] [phone]   [facility] [phone]   [facility] [phone]   [facility] [phone]   [facility] [phone]   [facility] [phone]   [facility] [phone]   [facility] [phone]

## 2024-12-18 ENCOUNTER — APPOINTMENT (INPATIENT)
Dept: RADIOLOGY | Facility: HOSPITAL | Age: 39
DRG: 137 | End: 2024-12-18
Payer: COMMERCIAL

## 2024-12-18 PROBLEM — R65.10 SIRS (SYSTEMIC INFLAMMATORY RESPONSE SYNDROME) (HCC): Status: RESOLVED | Noted: 2023-07-29 | Resolved: 2024-12-18

## 2024-12-18 PROBLEM — J15.1 PNEUMONIA DUE TO PSEUDOMONAS (HCC): Status: ACTIVE | Noted: 2023-09-24

## 2024-12-18 LAB
ANION GAP SERPL CALCULATED.3IONS-SCNC: 7 MMOL/L (ref 4–13)
BASOPHILS # BLD AUTO: 0.02 THOUSANDS/ÂΜL (ref 0–0.1)
BASOPHILS NFR BLD AUTO: 0 % (ref 0–1)
BUN SERPL-MCNC: 14 MG/DL (ref 5–25)
CALCIUM SERPL-MCNC: 8.7 MG/DL (ref 8.4–10.2)
CHLORIDE SERPL-SCNC: 117 MMOL/L (ref 96–108)
CO2 SERPL-SCNC: 26 MMOL/L (ref 21–32)
CREAT SERPL-MCNC: 0.57 MG/DL (ref 0.6–1.3)
EOSINOPHIL # BLD AUTO: 0.14 THOUSAND/ÂΜL (ref 0–0.61)
EOSINOPHIL NFR BLD AUTO: 2 % (ref 0–6)
ERYTHROCYTE [DISTWIDTH] IN BLOOD BY AUTOMATED COUNT: 15.5 % (ref 11.6–15.1)
GFR SERPL CREATININE-BSD FRML MDRD: 131 ML/MIN/1.73SQ M
GLUCOSE SERPL-MCNC: 96 MG/DL (ref 65–140)
HCT VFR BLD AUTO: 34.7 % (ref 36.5–49.3)
HGB BLD-MCNC: 10.5 G/DL (ref 12–17)
IMM GRANULOCYTES # BLD AUTO: 0.05 THOUSAND/UL (ref 0–0.2)
IMM GRANULOCYTES NFR BLD AUTO: 1 % (ref 0–2)
LYMPHOCYTES # BLD AUTO: 1.28 THOUSANDS/ÂΜL (ref 0.6–4.47)
LYMPHOCYTES NFR BLD AUTO: 16 % (ref 14–44)
MAGNESIUM SERPL-MCNC: 2.3 MG/DL (ref 1.9–2.7)
MCH RBC QN AUTO: 29.7 PG (ref 26.8–34.3)
MCHC RBC AUTO-ENTMCNC: 30.3 G/DL (ref 31.4–37.4)
MCV RBC AUTO: 98 FL (ref 82–98)
MONOCYTES # BLD AUTO: 0.58 THOUSAND/ÂΜL (ref 0.17–1.22)
MONOCYTES NFR BLD AUTO: 7 % (ref 4–12)
NEUTROPHILS # BLD AUTO: 5.73 THOUSANDS/ÂΜL (ref 1.85–7.62)
NEUTS SEG NFR BLD AUTO: 74 % (ref 43–75)
NRBC BLD AUTO-RTO: 0 /100 WBCS
PHOSPHATE SERPL-MCNC: 3.3 MG/DL (ref 2.7–4.5)
PLATELET # BLD AUTO: 186 THOUSANDS/UL (ref 149–390)
PMV BLD AUTO: 10.6 FL (ref 8.9–12.7)
POTASSIUM SERPL-SCNC: 3.4 MMOL/L (ref 3.5–5.3)
PROCALCITONIN SERPL-MCNC: 0.47 NG/ML
RBC # BLD AUTO: 3.53 MILLION/UL (ref 3.88–5.62)
SODIUM SERPL-SCNC: 150 MMOL/L (ref 135–147)
VANCOMYCIN SERPL-MCNC: 18.2 UG/ML (ref 10–20)
WBC # BLD AUTO: 7.8 THOUSAND/UL (ref 4.31–10.16)

## 2024-12-18 PROCEDURE — 94760 N-INVAS EAR/PLS OXIMETRY 1: CPT

## 2024-12-18 PROCEDURE — NC001 PR NO CHARGE: Performed by: STUDENT IN AN ORGANIZED HEALTH CARE EDUCATION/TRAINING PROGRAM

## 2024-12-18 PROCEDURE — 94664 DEMO&/EVAL PT USE INHALER: CPT

## 2024-12-18 PROCEDURE — 94640 AIRWAY INHALATION TREATMENT: CPT

## 2024-12-18 PROCEDURE — 94669 MECHANICAL CHEST WALL OSCILL: CPT

## 2024-12-18 PROCEDURE — 71045 X-RAY EXAM CHEST 1 VIEW: CPT

## 2024-12-18 PROCEDURE — 84100 ASSAY OF PHOSPHORUS: CPT

## 2024-12-18 PROCEDURE — 84145 PROCALCITONIN (PCT): CPT

## 2024-12-18 PROCEDURE — 85025 COMPLETE CBC W/AUTO DIFF WBC: CPT

## 2024-12-18 PROCEDURE — 99233 SBSQ HOSP IP/OBS HIGH 50: CPT | Performed by: STUDENT IN AN ORGANIZED HEALTH CARE EDUCATION/TRAINING PROGRAM

## 2024-12-18 PROCEDURE — 80048 BASIC METABOLIC PNL TOTAL CA: CPT

## 2024-12-18 PROCEDURE — 94003 VENT MGMT INPAT SUBQ DAY: CPT

## 2024-12-18 PROCEDURE — 94150 VITAL CAPACITY TEST: CPT

## 2024-12-18 PROCEDURE — 94668 MNPJ CHEST WALL SBSQ: CPT

## 2024-12-18 PROCEDURE — 80202 ASSAY OF VANCOMYCIN: CPT

## 2024-12-18 PROCEDURE — 83735 ASSAY OF MAGNESIUM: CPT

## 2024-12-18 RX ORDER — POTASSIUM CHLORIDE 20MEQ/15ML
40 LIQUID (ML) ORAL ONCE
Status: COMPLETED | OUTPATIENT
Start: 2024-12-18 | End: 2024-12-18

## 2024-12-18 RX ORDER — IPRATROPIUM BROMIDE AND ALBUTEROL SULFATE 2.5; .5 MG/3ML; MG/3ML
3 SOLUTION RESPIRATORY (INHALATION)
Status: DISCONTINUED | OUTPATIENT
Start: 2024-12-18 | End: 2024-12-27 | Stop reason: HOSPADM

## 2024-12-18 RX ORDER — CEFTRIAXONE 1 G/50ML
1000 INJECTION, SOLUTION INTRAVENOUS EVERY 24 HOURS
Status: DISCONTINUED | OUTPATIENT
Start: 2024-12-18 | End: 2024-12-18

## 2024-12-18 RX ORDER — ACETAMINOPHEN 10 MG/ML
1000 INJECTION, SOLUTION INTRAVENOUS EVERY 8 HOURS PRN
Status: DISCONTINUED | OUTPATIENT
Start: 2024-12-18 | End: 2024-12-27 | Stop reason: HOSPADM

## 2024-12-18 RX ORDER — VANCOMYCIN HYDROCHLORIDE 1 G/200ML
1000 INJECTION, SOLUTION INTRAVENOUS EVERY 12 HOURS
Status: DISCONTINUED | OUTPATIENT
Start: 2024-12-18 | End: 2024-12-18

## 2024-12-18 RX ORDER — ACETYLCYSTEINE 200 MG/ML
3 SOLUTION ORAL; RESPIRATORY (INHALATION) EVERY 6 HOURS
Status: DISCONTINUED | OUTPATIENT
Start: 2024-12-18 | End: 2024-12-27 | Stop reason: HOSPADM

## 2024-12-18 RX ORDER — CEFEPIME HYDROCHLORIDE 2 G/50ML
2000 INJECTION, SOLUTION INTRAVENOUS EVERY 12 HOURS
Status: DISCONTINUED | OUTPATIENT
Start: 2024-12-18 | End: 2024-12-23

## 2024-12-18 RX ADMIN — POTASSIUM CHLORIDE 40 MEQ: 20 SOLUTION ORAL at 03:06

## 2024-12-18 RX ADMIN — DEXTROSE 500 ML: 5 SOLUTION INTRAVENOUS at 03:15

## 2024-12-18 RX ADMIN — ALBUTEROL SULFATE 2.5 MG: 2.5 SOLUTION RESPIRATORY (INHALATION) at 07:36

## 2024-12-18 RX ADMIN — ACETYLCYSTEINE 600 MG: 200 SOLUTION ORAL; RESPIRATORY (INHALATION) at 07:36

## 2024-12-18 RX ADMIN — CHLORHEXIDINE GLUCONATE 15 ML: 1.2 RINSE ORAL at 08:36

## 2024-12-18 RX ADMIN — CHLORHEXIDINE GLUCONATE 15 ML: 1.2 RINSE ORAL at 21:04

## 2024-12-18 RX ADMIN — VANCOMYCIN HYDROCHLORIDE 1000 MG: 1 INJECTION, SOLUTION INTRAVENOUS at 08:35

## 2024-12-18 RX ADMIN — POTASSIUM CHLORIDE 40 MEQ: 20 SOLUTION ORAL at 08:36

## 2024-12-18 RX ADMIN — ACETYLCYSTEINE 600 MG: 200 SOLUTION ORAL; RESPIRATORY (INHALATION) at 19:45

## 2024-12-18 RX ADMIN — IPRATROPIUM BROMIDE AND ALBUTEROL SULFATE 3 ML: .5; 3 SOLUTION RESPIRATORY (INHALATION) at 19:43

## 2024-12-18 RX ADMIN — IPRATROPIUM BROMIDE AND ALBUTEROL SULFATE 3 ML: .5; 3 SOLUTION RESPIRATORY (INHALATION) at 13:21

## 2024-12-18 RX ADMIN — CEFEPIME HYDROCHLORIDE 2000 MG: 2 INJECTION, SOLUTION INTRAVENOUS at 14:09

## 2024-12-18 RX ADMIN — ACETYLCYSTEINE 600 MG: 200 SOLUTION ORAL; RESPIRATORY (INHALATION) at 13:21

## 2024-12-18 RX ADMIN — CEFTRIAXONE 1000 MG: 1 INJECTION, SOLUTION INTRAVENOUS at 10:31

## 2024-12-18 NOTE — CONSULTS
Vancomycin IV Pharmacy-to-Dose Consultation     Vancomycin has been discontinued.  Pharmacy will sign off.  Please contact or re-consult with questions.    Dariela Osullivan, Pharmacist

## 2024-12-18 NOTE — ASSESSMENT & PLAN NOTE
As evidenced by leukocytosis and lactic acidosis  With continued improvement  Follow-up blood cultures  BC 1/2 Resistant staph epi  Antigens negative  MRSA negative  Sputum with GPCs  Continue Vanc D2

## 2024-12-18 NOTE — CASE MANAGEMENT
Case Management Assessment & Discharge Planning Note    Patient name Hemanth Swanson  Location ICU 05/ICU 05 MRN [id number]  : 1985 Date 2024       Current Admission Date: 2024  Current Admission Diagnosis:Pneumonia due to Pseudomonas and Serratia   Patient Active Problem List    Diagnosis Date Noted Date Diagnosed    History of pulmonary embolus (PE) 2024     Hematuria 2024     Rash of back 10/25/2024     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HCC) 10/17/2024     Palliative care by specialist 10/11/2024     Goals of care, counseling/discussion 10/11/2024     Toxic metabolic encephalopathy 10/07/2024     Hypernatremia 10/06/2024     Cardiac arrest due to other underlying condition ([facility]) 2024     Seizure-like activity ([facility]) 2024     Bradycardia 2024     Ventilator dependent ([facility]) 2024     Obesity hypoventilation syndrome ([facility]) 10/24/2023     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     Psychophysiological insomnia 10/18/2023     Impaired mobility 2023     Pneumonia due to Pseudomonas and Serratia 2023     Status post tracheostomy ([facility]) 2023     S/P percutaneous endoscopic gastrostomy (PEG) tube placement ([facility]) 2023     Anxiety 2023     Acute on chronic respiratory failure with hypoxia and hypercapnia ([facility]) 2023     Sepsis ([facility]) 2023     Acute pulmonary embolism without acute cor pulmonale ([facility]) 2023     Depression 2023     Hypokalemia 2023     History of LVH (left ventricular hypertrophy) 2023     Pure hypertriglyceridemia 2023     Unilateral vestibular schwannoma ([facility]) 2023     Port-A-Cath in place 2023     Diplopia 2023     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[facility]) 2023     Umbilical hernia without obstruction and without gangrene 12/10/2022     Tobacco use 12/10/2022     Retroperitoneal lymphadenopathy 12/10/2022       LOS (days): 2  Geometric Mean LOS (GMLOS) (days):   Days to  GMLOS:     OBJECTIVE:    Risk of Unplanned Readmission Score: 30.52     Current admission status: Inpatient    Preferred Pharmacy:   [id number] - [place], [place] - [address] [address]  Phone: [phone] Fax: [phone]    Primary Care Provider: Ela Douglas MD    Primary Insurance: [facility]  Secondary Insurance:     ASSESSMENT:  Active Health Care Proxies    There are no active Health Care Proxies on file.       Advance Directives  Primary Contact: Dmitry Swanson    Readmission Root Cause  30 Day Readmission: No    Patient Information  Admitted from:: Home  Mental Status: Other (Comment), Alert (chronic vent, trach)  During Assessment patient was accompanied by: Not accompanied during assessment  Assessment information provided by:: Sister  Primary Caregiver: Family  Caregiver's Name:: Dmitry Swanson  Caregiver's Relationship to Patient:: Family Member (sister)  Caregiver's Telephone Number:: [phone]  Support Systems: Family members  County of Residence: [place]  What city do you live in?: [place]  Home entry access options. Select all that apply.: Ramp  Type of Current Residence: [address] home  Upon entering residence, is there a bedroom on the main floor (no further steps)?: Yes  Upon entering residence, is there a bathroom on the main floor (no further steps)?: Yes  Living Arrangements: Lives w/ Family members    Activities of Daily Living Prior to Admission  Functional Status: Total dependent  Completes ADLs independently?: No  Level of ADL dependence: Total Dependent  Ambulates independently?: No  Level of ambulatory dependence: Total Dependent (nicky lift)  Does patient use assisted devices?: Yes  Assisted Devices (DME) used: Home Oxygen concentrator, Portable Oxygen tanks, Hospital Bed, Nicky lift, Wheelchair, Other (Comment) (vent, heater for vent, suction machine, tube feed supplies, trach supplies, alternating pressure  mattress, lift recliner, percussion wrap)  DME Company Name (respiratory supplies): [employer]  Does patient currently own DME?: Yes  What DME does the patient currently own?: Home Oxygen concentrator, Portable Oxygen tanks, Hospital Bed, Wallace lift, Wheelchair, Other (Comment) (vent, heater for vent, suction machine, tube feed supplies, trach supplies, alternating pressure mattress, lift recliner, percussion wrap)  Does patient have a history of Outpatient Therapy (PT/OT)?: No  Does the patient have a history of Short-Term Rehab?: Yes  Does patient have a history of HHC?: Yes  Does patient currently have HHC?: No (per sister pt was going to start with services through [facility] prior to admission)    Current Home Health Care  [facility] Agency Name:: [facility]    Patient Information Continued  Income Source: SSI/SSD  Does patient have prescription coverage?: Yes  Does patient receive dialysis treatments?: No    Means of Transportation  Means of Transport to Appts:: Other (Comment) (Wheelchair van covered by insurance, family accompanies pt to manage vent)      DISCHARGE DETAILS:    Discharge planning discussed with:: SisterDmitry  [place] of Choice: Yes  Comments - Freedom of Choice: SW spoke with pt's sister over phone to assess needs and discuss plans.  Pt's sister is planning on pt returning home with family when discharged.  Sister shared that pt was going to start with services through [employer] prior to admission and she would like to have those services arranged for discharge.  Referral will be made as requested.  Sister also said that she has been trying to work with [employer] to get pt a percussion vest instead of percussion wrap.  Per sister pt can take the wrap off but will not be able to take vest off due to clasps.  SW offered to discuss request with [employer].  SW offered ongoing support and assistance to family.  SAHIL will follow to monitor progress  and assist with transfer home when ready.  CM contacted family/caregiver?: Yes  Were Treatment Team discharge recommendations reviewed with patient/caregiver?: Yes  Did patient/caregiver verbalize understanding of patient care needs?: Yes    Contacts  Patient Contacts: Dmitry Swanson (sister)  Relationship to Patient:: Family  Contact Method: Phone  Phone Number: [phone]  Reason/Outcome: Discharge Planning    Requested Home Health Care         Is the patient interested in HHC at discharge?: Yes  Home Health Discipline requested:: Nursing, Physical Therapy, Occupational Therapy  Home Health Agency Name:: [facility] External Referral Reason (only applicable if external HHA name selected): Patient has established relationship with provider  Home Health Follow-Up Provider:: PCP  Home Health Services Needed:: Evaluate Functional Status and Safety, Strengthening/Theraputic Exercises to Improve Function, Wound/Ostomy Care, Other (comment) (Chronic trach, vent)  Homebound Criteria Met:: Requires Medical Transportation  Supporting Clincal Findings:: Bed Bound or Wheelchair Bound    DME Referral Provided  Referral made for DME?: No    Other Referral/Resources/Interventions Provided:  Interventions: HHC  Referral Comments: Referral made to [facility] to arrange services upon discharge    Treatment Team Recommendation: Home with Home Health Care  Discharge Destination Plan:: Home with Home Health Care  Transport at Discharge : BLS Ambulance

## 2024-12-18 NOTE — QUICK NOTE
Patient's sister Dmitry updated via phone. We discussed sputum culture with pseudomonas and serratia and treatment with cefepime, episodes of mucus plugging requiring frequent suctioning and increased support from the ventilator. All questions have been answered.

## 2024-12-18 NOTE — ASSESSMENT & PLAN NOTE
PEG tube re-placed 12/10 after dislodgment  TF restarted, jevity 1.5 at 70/hr   Q4H in setting of hypernatremia

## 2024-12-18 NOTE — PROGRESS NOTES
Critical Care Attending Note; John Sutherland   Note Date: 24  Note Time: 9:11 AM    Patient: Hemanth Swanson  Age, : 39 y.o., 1985 MRN: [id number] Code Status: Level 1 - Full Code Patient Location: ICU 05/ICU 05   Hospital LOS:2 days  ]   Patient seen and examined, medical record reviewed, discussed with house staff and nursing staff.     HPI   CC: AMS  39M with a PMH Seminoma(Orcetomy/Chemo), HTN, HFpEF, DM, Cardiac Arrest(Hypoxic - ), PE, Demyelinating Neuromuscular Disorder(Trach/Peg), recurrent PEG tube dislodgement who presents due to agitation found to have hypernatremia.       Main ICU Plans:       #Neuro  Demyelinating NM disorder/Agitation - Baseline able to eat  - Delirium precautions  - PRN Haldol    #Card  Bradycardia - likely secondary to vagaling   - Tele     #Pulm  Respiratory Failure - Mucous plugging - Low utility of bronchoscopy at this time, repeat Ct imaging shows scarred lower lobes.   - Aggressive Pulm hygiene regimen - mucomyst, albuterol, chest PT  - LTVV VC - 6-8cc/kg IDBW - TCT daily  - Wean FiO2 - Maintain Oxygen Sat >92%  - VAP Bundle  - HOB > 30o    - PEEP Titrate      Pulmonary embolism  - Hold due to hematuria    #Renal  Hypernatremia - Imrpoving  - FWF    Hematuria - Likely traumatic from puga  - Monitor  - Puga     #GI  Bowel regimn    #ID   Bronchitis - GPC sputum - MRSA Nares negative   - Narrow Vanc - CTX -  - Follow up infectious workup      #DVT/GI ppx  SCD    #Lines/Tubes/Drains:   Invasive Devices       Central Venous Catheter Line  Duration             Port A Cath 23 Right Subclavian 645 days              Peripheral Intravenous Line  Duration             Peripheral IV 12/15/24 Distal;Right;Ventral (anterior) Forearm 2 days    Peripheral IV 24 Proximal;Right;Upper;Ventral (anterior) Arm 2 days              Drain  Duration             Gastrostomy/Enterostomy Percutaneous Interventional Gastrostomy 18 Fr.  days    Urethral Catheter  Temperature probe 16 Fr. 2 days              Airway  Duration             Surgical Airway Shiley Distal 48 days                    #Nutrition:   Diet Enteral/Parenteral; Tube Feeding No Oral Diet; Jevity 1.5; Continuous; 70; 250; Water; Every 4 hours        #Code Status:   Level 1 - Full Code    #Dispo:   ICU        John Sutherland MD  Pulmonary, Critical Care    Critical care time, excluding procedures, teaching, family meetings, and excludes any prior time recorded by the AP/resident, 35 minutes. Upon my evaluation, this patient has a high probability of imminent or life-threatening deterioration due to above problems which required my direct attention, intervention, and personal management.   Impression/Active Problems:     HTN   HFpEF   DM   Neuromuscualr disorder   Tracheostomy   PEG   Hypernatremia   Aspiration   Vaso vagal    Bradycardia  AMS  Bronchitis       Physical Exam:     Vital Signs:   Weight: 96.6 kg (212 lb 15.4 oz)  IBW: Ideal body weight: 86.8 kg (191 lb 5.7 oz)  Adjusted ideal body weight: 90.7 kg (200 lb)  Temp:  [97 °F (36.1 °C)-98.4 °F (36.9 °C)] 97.2 °F (36.2 °C)  HR:  [] 53  BP: ()/(48-80) 96/52  Resp:  [14-40] 16  SpO2:  [96 %-100 %] 100 %  O2 Device: Ventilator  FiO2 (%):  [40] 40  Physical Exam:   General: NAD  Neuro: Nodding appropriately   Heart: RRR  Lungs: Non labored  Abdomen: Soft  Extremities: Min edema                Ventilator Settings:    FiO2 (%):  [40] 40    Results from last 7 days   Lab Units 12/15/24  2350   PO2 SYLVESTER mm Hg 58.8*     Radiologic Images Reviewed:    CT C/A/P  1.  Complete collapse of the left lower lobe and severe atelectasis of the right lower lobe similar to prior examination. Superimposed aspiration or pneumonia cannot be excluded. Trace left pleural effusion.   2.  2 mm calculus in the distal right ureter just prior to the ureterovesicular junction. No significant hydronephrosis.   3.  Mild thickening of the urinary bladder wall, correlate  "with urinalysis for cystitis.     CT head  No acute intracranial hemorrhage, midline shift, or mass effect.   Input / Output:     Intake/Output Summary (Last 24 hours) at 12/18/2024 0911  Last data filed at 12/18/2024 0515  Gross per 24 hour   Intake 1466 ml   Output 400 ml   Net 1066 ml            Infusions:       Scheduled Medications:  Current Facility-Administered Medications   Medication Dose Route Frequency Provider Last Rate    acetaminophen  1,000 mg Intravenous Q6H PRN  [place] Tricia, CRNP      acetylcysteine  3 mL Nebulization Q8H  [place] Tricia, CRNP      albuterol  2.5 mg Nebulization Q8H  [place]ef[place] Tricia, CRNP      chlorhexidine  15 mL Mouth/Throat Q12H ISAEL  [place] Tricia, CRNP      LORazepam  2 mg Intravenous Q4H PRN  [place] Tricia, CRNP      vancomycin  1,000 mg Intravenous Q12H  [place] Tricia, CRNP 1,000 mg (12/18/24 0835)       PRN Medications:    acetaminophen    LORazepam    Labs Reviewed:  Results from last 7 days   Lab Units 12/18/24  0201 12/17/24  0441 12/16/24  0822   WBC Thousand/uL 7.80 11.52* 13.23*   HEMOGLOBIN g/dL 10.5* 11.0* 11.3*   HEMATOCRIT % 34.7* 36.0* 37.3   PLATELETS Thousands/uL 186 206 236      Results from last 7 days   Lab Units 12/18/24  0201 12/17/24  0441 12/17/24  0009 12/16/24  1700 12/16/24  0822   SODIUM mmol/L 150* 153* 151*   < > 154*   CO2 mmol/L 26 30 30   < > 29   BUN mg/dL 14 17 18   < > 23   CALCIUM mg/dL 8.7 8.9 8.7   < > 9.2   MAGNESIUM mg/dL 2.3 2.4  --   --  2.6   PHOSPHORUS mg/dL 3.3 2.2*  --   --  3.2    < > = values in this interval not displayed.         Invalid input(s): \"ASTSGOT\", \"ALTSGPT\"LABRCNTIP@ ,alkphos:3,tbilirubin:3,dbilirubin:3)@  Results from last 7 days   Lab Units 12/15/24  2343   INR  1.19           Invalid input(s): \"TROPT\", \"PBNP\"             I have personally seen and examined the patient on (12/18/24 between 1853-2187). I discussed the patient with the AP/resident including, but not limited to, " verifying findings; reviewing labs and x-rays; discussing with consultants; developing the plan of care with the bedside nurse; and discussing treatment plan with patient or surrogate.  I have reviewed the note and assessment performed by the AP/resident and agree with the AP/resident’s documented findings and plan of care with the above additions/exceptions. Please see my comments for details and adjustments.

## 2024-12-18 NOTE — ASSESSMENT & PLAN NOTE
Patient with history of waxing and waning agitation   Required 8 mg IM Versed + 2 mg IV versed at SLE  CT negative  UDS + THC, benzos   MRI during previous admission revealed images consistent with anoxic brain injury    Plan:  PRN ativan for agitation   Limit sedatives that can cause bradycardia; as patient has history of vagal/bradycardiac events   Delirium precautions  Infectious work-up-> continue PNA tx with vancomycin  Correct sodium as outlined

## 2024-12-18 NOTE — UTILIZATION REVIEW
Continued Stay Review    Date: 12/18/2024                          Current Patient Class: Inpatient  Current Level of Care: Critical Care    HPI:39 y.o. male initially admitted on 12/16/2024     Assessment/Plan: Toxic Metabolic Encephalopathy  LLL PNA, worsening encephalopathy with agitation. On Vancomycin in setting of staph epi in BC, 2nd blood culture pending. Speciation of sputum pending. Eliquis held in setting of hematuria. Failed trach collar wean x2 yesterday. Rested comfortably overnight on ACVC with minimal settings.   Cardio-Pulmonary monitoring.  Trac / vented.  Failed trac collar x2 on 12/17.  Continue IV Abx-vanco.  Continue nebulization therapyChest PT.  Continue frequent turning & suctioning.   , Goal 150-152.  Restarted tube feeds.      Medications:    Scheduled Medications:  acetylcysteine, 3 mL, Nebulization, Q8H  albuterol, 2.5 mg, Nebulization, Q8H  cefTRIAXone, 1,000 mg, Intravenous, Q24H  chlorhexidine, 15 mL, Mouth/Throat, Q12H ISAEL      Continuous IV Infusions:     PRN Meds:  acetaminophen, 1,000 mg, Intravenous, Q6H PRN      Discharge Plan: TBD    Vital Signs (last 3 days)       Date/Time Temp Pulse Resp BP MAP (mmHg) SpO2 FiO2 (%) O2 Device Patient Position - Orthostatic VS Purvi Coma Scale Score    12/18/24 1000 97.5 °F (36.4 °C) 54 15 111/73 87 100 % -- -- -- --    12/18/24 0900 97.5 °F (36.4 °C) 59 19 105/62 78 100 % -- -- -- --    12/18/24 0800 97.5 °F (36.4 °C) 69 21 114/59 77 100 % 40 Ventilator Lying --    12/18/24 0738 -- -- -- -- -- 100 % -- Ventilator -- --    12/18/24 0700 97.2 °F (36.2 °C) 53 16 96/52 70 100 % -- -- -- --    12/18/24 0600 97.2 °F (36.2 °C) 48 16 132/76 99 100 % -- -- -- --    12/18/24 0500 97.3 °F (36.3 °C) 61 14 87/48 63 100 % -- Ventilator -- --    12/18/24 0400 97.3 °F (36.3 °C) -- -- -- -- -- -- -- -- 11    12/18/24 0345 -- -- -- -- -- 100 % 40 Ventilator -- --    12/18/24 0300 97.5 °F (36.4 °C) 56 14 102/61 76 100 % -- -- -- --    12/18/24 0200 97.5  °F (36.4 °C) 55 14 121/58 83 100 % -- -- -- --    12/18/24 0000 97.9 °F (36.6 °C) 100 15 91/50 65 99 % 40 Ventilator -- 11    12/17/24 2324 -- 89 18 -- -- 99 % -- -- -- --    12/17/24 2300 97 °F (36.1 °C) 51 18 111/60 80 100 % 40 Ventilator -- --    12/17/24 2200 97 °F (36.1 °C) 55 14 93/54 69 100 % -- -- -- --    12/17/24 2100 97.2 °F (36.2 °C) 58 17 102/56 75 100 % -- -- -- --    12/17/24 2000 97 °F (36.1 °C) 52 14 102/63 78 99 % 40 Ventilator Lying 11    12/17/24 1910 -- 52 14 -- -- 99 % 40 Ventilator -- --    12/17/24 1800 97.5 °F (36.4 °C) 59 14 97/59 72 97 % -- -- -- --    12/17/24 1700 98.1 °F (36.7 °C) 81 16 97/60 72 96 % -- -- -- --    12/17/24 1600 97.9 °F (36.6 °C) 53 19 154/80 107 97 % -- Ventilator Lying 11 12/17/24 1538 -- -- -- -- -- 96 % -- -- -- --    12/17/24 1517 -- -- -- -- -- 99 % -- -- -- --    12/17/24 1500 98.2 °F (36.8 °C) 56 28 124/67 91 100 % -- -- -- --    12/17/24 1400 98.4 °F (36.9 °C) 53 21 114/65 85 100 % -- -- -- --    12/17/24 1300 98.2 °F (36.8 °C) 48 40 111/68 84 100 % -- -- -- --    12/17/24 1200 98.2 °F (36.8 °C) 60 23 104/57 76 99 % -- Ventilator Lying 11    12/17/24 1125 -- -- -- -- -- 99 % -- -- -- --    12/17/24 1100 98.2 °F (36.8 °C) 72 21 89/50 64 99 % -- -- -- --    12/17/24 1000 97.9 °F (36.6 °C) 66 28 94/51 66 99 % -- -- -- --    12/17/24 0900 97.9 °F (36.6 °C) 60 20 113/60 81 99 % -- -- -- --    12/17/24 0800 97.9 °F (36.6 °C) 59 17 119/67 89 99 % -- Ventilator Lying 11    12/17/24 0752 -- 51 14 -- -- 100 % -- -- -- --    12/17/24 0717 -- -- -- -- -- 99 % -- -- -- --    12/17/24 0700 97.9 °F (36.6 °C) 63 17 93/58 70 99 % -- -- -- --    12/17/24 0600 98.1 °F (36.7 °C) 57 27 104/63 79 99 % -- -- -- --    12/17/24 0500 98.4 °F (36.9 °C) 97 28 102/66 80 99 % -- -- -- --    12/17/24 0400 97.9 °F (36.6 °C) 52 29 99/67 80 100 % -- -- -- 12 12/17/24 0338 -- -- -- -- -- 99 % 40 Ventilator -- --    12/17/24 0300 97.9 °F (36.6 °C) 60 24 96/73 82 99 % -- -- -- --    12/17/24  0200 97.9 °F (36.6 °C) 59 42 110/60 80 100 % -- -- -- --    12/17/24 0100 98.1 °F (36.7 °C) 58 41 111/58 80 100 % -- -- -- --    12/17/24 0000 98.2 °F (36.8 °C) 65 40 97/56 71 98 % -- Ventilator -- 12 12/16/24 2330 -- 70 15 -- -- 99 % -- -- -- --    12/16/24 2314 -- 62 14 -- -- 99 % 40 Ventilator -- --    12/16/24 2300 99 °F (37.2 °C) 72 14 89/55 67 99 % -- -- -- --    12/16/24 2200 98.8 °F (37.1 °C) 63 32 116/69 -- 99 % -- -- -- --    12/16/24 2100 98.6 °F (37 °C) 69 40 114/65 85 99 % -- -- -- --    12/16/24 2000 98.8 °F (37.1 °C) 67 37 138/64 92 100 % -- -- -- 12 12/16/24 1930 98.6 °F (37 °C) 74 39 115/75 90 99 % -- -- -- --    12/16/24 1916 -- 85 15 -- -- 98 % 40 Ventilator -- --    12/16/24 1900 97.9 °F (36.6 °C) 69 51 118/65 84 99 % -- -- -- --    12/16/24 1800 97.7 °F (36.5 °C) 69 45 116/66 86 98 % -- -- -- --    12/16/24 1700 97.9 °F (36.6 °C) 63 50 118/65 87 98 % -- -- -- --    12/16/24 1600 97.9 °F (36.6 °C) 89 23 129/83 101 97 % -- T-Piece Lying 10    12/16/24 1541 97.3 °F (36.3 °C) 94 20 125/73 93 98 % -- -- -- --    12/16/24 1400 97.2 °F (36.2 °C) 80 27 114/60 77 96 % -- -- -- --    12/16/24 1300 96.6 °F (35.9 °C) 89 22 92/52 66 95 % -- -- -- --    12/16/24 1200 96.8 °F (36 °C) 79 45 89/53 65 96 % -- -- -- 10    12/16/24 1116 -- -- -- -- -- 97 % -- -- -- --    12/16/24 1000 97.3 °F (36.3 °C) 63 45 90/54 67 96 % -- -- -- --    12/16/24 0902 -- 58 15 -- -- 98 % -- -- -- --    12/16/24 0900 97.7 °F (36.5 °C) 61 20 126/70 92 97 % -- -- -- --    12/16/24 0800 -- -- -- -- -- -- -- -- -- 10    12/16/24 0745 98 °F (36.7 °C) 56 71 106/57 75 93 % -- -- -- --    12/16/24 0726 -- 57 15 -- -- 97 % -- -- -- --          Weight (last 2 days)       Date/Time Weight    12/18/24 0542 96.6 (212.96)    12/17/24 0600 94.1 (207.45)    12/16/24 0745 94 (207.23)            Pertinent Labs/Diagnostic Results:   Radiology:  No orders to display     Cardiology:  ECG 12 lead   Final Result by Avelino Chase MD (12/17 0959)    Sinus bradycardia   Rightward axis   T wave abnormality, consider inferior ischemia   Abnormal ECG      Confirmed by Avelino Chase ([id number]) on 12/17/2024 9:59:06 AM        GI:  No orders to display     Results from last 7 days   Lab Units 12/18/24  0201 12/17/24  0441 12/16/24  0822 12/15/24  2343   WBC Thousand/uL 7.80 11.52* 13.23* 14.84*   HEMOGLOBIN g/dL 10.5* 11.0* 11.3* 14.0   HEMATOCRIT % 34.7* 36.0* 37.3 47.6   PLATELETS Thousands/uL 186 206 236 281   TOTAL NEUT ABS Thousands/µL 5.73 9.45* 10.73* 11.84*     Results from last 7 days   Lab Units 12/18/24  0201 12/17/24  0441 12/17/24  0009 12/16/24  1700 12/16/24  0822 12/15/24  2343   SODIUM mmol/L 150* 153* 151* 155* 154* 162*   POTASSIUM mmol/L 3.4* 3.6 3.6 2.8* 3.9 4.3   CHLORIDE mmol/L 117* 116* 117* 117* 117* 116*   CO2 mmol/L 26 30 30 29 29 36*   ANION GAP mmol/L 7 7 4 9 8 10   BUN mg/dL 14 17 18 21 23 27*   CREATININE mg/dL 0.57* 0.66 0.67 0.72 0.65 0.87   EGFR ml/min/1.73sq m 131 123 122 119 124 110   CALCIUM mg/dL 8.7 8.9 8.7 8.8 9.2 10.5*   CALCIUM, IONIZED mmol/L  --   --   --   --  1.14  --    MAGNESIUM mg/dL 2.3 2.4  --   --  2.6 2.7   PHOSPHORUS mg/dL 3.3 2.2*  --   --  3.2  --      Results from last 7 days   Lab Units 12/16/24  0822 12/15/24  2343   AST U/L 18 17   ALT U/L 24 33   ALK PHOS U/L 95 115*   TOTAL PROTEIN g/dL 7.3 9.0*   ALBUMIN g/dL 3.7 4.3   TOTAL BILIRUBIN mg/dL 0.48 0.38     Results from last 7 days   Lab Units 12/16/24  1109   POC GLUCOSE mg/dl 90     Results from last 7 days   Lab Units 12/18/24  0201 12/17/24  0441 12/17/24  0009 12/16/24  1700 12/16/24  0822 12/15/24  2343   GLUCOSE RANDOM mg/dL 96 88 102 108 75 83     Results from last 7 days   Lab Units 12/16/24  0038   OSMOLALITY, SERUM mmol/*     Results from last 7 days   Lab Units 12/15/24  2350   PH SYLVESTER  7.318   PCO2 SYLVESTER mm Hg 53.5*   PO2 SYLVESTER mm Hg 58.8*   HCO3 SYLVESTER mmol/L 26.8   BASE EXC SYLVESTER mmol/L -0.1   O2 CONTENT SYLVESTER ml/dL 16.4   O2 HGB, VENOUS % 88.8*      Results from last 7 days   Lab Units 12/16/24  0157 12/15/24  2343   HS TNI 0HR ng/L  --  3   HS TNI 2HR ng/L <2  --    HSTNI D2 ng/L <-1  --      Results from last 7 days   Lab Units 12/15/24  2343   PROTIME seconds 15.5*   INR  1.19   PTT seconds 28     Results from last 7 days   Lab Units 12/16/24  0157   TSH 3RD GENERATON uIU/mL 2.090     Results from last 7 days   Lab Units 12/18/24  0201 12/17/24  0441 12/16/24  0822 12/15/24  2343   PROCALCITONIN ng/ml 0.47* 0.32* 0.11 0.07     Results from last 7 days   Lab Units 12/16/24  0822 12/16/24  0157 12/15/24  2343   LACTIC ACID mmol/L 1.2 1.0 3.1*     Results from last 7 days   Lab Units 12/15/24  2343   LIPASE u/L 30     Results from last 7 days   Lab Units 12/16/24  0038   OSMOLALITY, SERUM mmol/*   OSMO UR mmol/     Results from last 7 days   Lab Units 12/16/24  0903 12/16/24  0038 12/16/24  0017   CLARITY UA  Clear  --  Clear   COLOR UA  Yellow  --  Yellow   SPEC GRAV UA  1.025  --  >=1.030   PH UA  5.5  --  6.0   GLUCOSE UA mg/dl Negative  --  Negative   KETONES UA mg/dl Negative  --  Negative   BLOOD UA  Small*  --  3+*   PROTEIN UA mg/dl 30 (1+)*  --  Trace*   NITRITE UA  Negative  --  Negative   BILIRUBIN UA  Negative  --  Negative   UROBILINOGEN UA E.U./dl  --   --  0.2   UROBILINOGEN UA (BE) mg/dl <2.0  --   --    LEUKOCYTES UA  Negative  --  Negative   WBC UA /hpf 1-2  --  4-10*   RBC UA /hpf 2-4  --  20-30*   BACTERIA UA /hpf Occasional  --  Occasional   EPITHELIAL CELLS WET PREP /hpf None Seen  --  Occasional   MUCUS THREADS  Occasional*  --  Occasional*   SODIUM UR   --  114  --    CREATININE UR mg/dL  --  138.0  --      Results from last 7 days   Lab Units 12/16/24  0904   STREP PNEUMONIAE ANTIGEN, URINE  Negative   LEGIONELLA URINARY ANTIGEN  Negative     Results from last 7 days   Lab Units 12/16/24  0038   AMPH/METH  Negative   BARBITURATE UR  Negative   BENZODIAZEPINE UR  Positive*   COCAINE UR  Negative   METHADONE URINE   Negative   OPIATE UR  Negative   PCP UR  Negative   THC UR  Positive*     Results from last 7 days   Lab Units 12/16/24  0822   ETHANOL LVL mg/dL <10     Results from last 7 days   Lab Units 12/16/24  1433 12/16/24  0903 12/16/24  0821 12/15/24  2353 12/15/24  2343   BLOOD CULTURE   --  No Growth at 24 hrs.  --  No Growth at 24 hrs. No Growth at 24 hrs.   GRAM STAIN RESULT  3+ Polys*  2+ Gram positive cocci in pairs*  --  Gram positive cocci in clusters*  --   --      Results from last 7 days   Lab Units 12/18/24  0201   VANCOMYCIN RM ug/mL 18.2       Network Utilization Review Department  ATTENTION: Please call with any questions or concerns to [phone] and carefully listen to the prompts so that you are directed to the right person. All voicemails are confidential.   For Discharge needs, contact Care Management DC Support Team at [phone] opt. 2  Send all requests for admission clinical reviews, approved or denied determinations and any other requests to dedicated fax number below belonging to the [facility] where the patient is receiving treatment. List of dedicated fax numbers for the Facilities:  FACILITY NAME UR FAX NUMBER   ADMISSION DENIALS (Administrative/Medical Necessity) [phone]   DISCHARGE SUPPORT TEAM (NETWORK) [phone]   PARENT CHILD HEALTH (Maternity/NICU/Pediatrics) [phone]   [facility] [phone]   [facility] [phone]   [facility] [phone]   [facility] [phone]   [facility] [phone]   [facility] [phone]   [facility] [phone]   [facility] [phone]   [facility] [phone]   On license of [facility] [phone]   [facility] [phone]   Formerly [facility]  [facility] [phone]

## 2024-12-18 NOTE — PROGRESS NOTES
Progress Note - Critical Care/ICU   Name: Hemanth Swanson 39 y.o. male I MRN: [id number]  Unit/Bed#: ICU 05 I Date of Admission: 12/16/2024   Date of Service: 12/18/2024 I Hospital Day: 2      Assessment & Plan  Toxic metabolic encephalopathy  Patient with history of waxing and waning agitation   Required 8 mg IM Versed + 2 mg IV versed at SLE  CT negative  UDS + THC, benzos   MRI during previous admission revealed images consistent with anoxic brain injury    Plan:  PRN ativan for agitation   Limit sedatives that can cause bradycardia; as patient has history of vagal/bradycardiac events   Delirium precautions  Infectious work-up-> continue PNA tx with vancomycin  Correct sodium as outlined   Acute on chronic respiratory failure with hypoxia and hypercapnia (HCC)  S/p trach for NM disease   Trach 6XL  History of significant mucous plugs  CT chest revealing Complete collapse of the left lower lobe and severe atelectasis of the right lower lobe similar to prior examination. Superimposed aspiration or pneumonia cannot be excluded. Trace left pleural effusion.     Plan:  Continue ACVC 14/500/40/6+  Failed trach collar x2 on 12/17  Continue Vancomycin D2  Agressive pulmonary hygiene   Mucomyst, albuterol  Chest therapy  Frequent suctioning/turning  SIRS (systemic inflammatory response syndrome) (HCC)  As evidenced by leukocytosis and lactic acidosis  With continued improvement  Follow-up blood cultures  BC 1/2 Resistant staph epi  Antigens negative  MRSA negative  Sputum with GPCs  Continue Vanc D2  Hypernatremia  Presents with sodium of 162  Per chart review appears patient was on p.o. 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  Holding eliquis acutely  Disposition: Critical care    ICU Core Measures     Vented Patient  VAP Bundle  VAP bundle ordered     A: Assess, Prevent, and Manage Pain Has pain been assessed? Yes  Need for changes to pain regimen? No   B: Both Spontaneous Awakening Trials (SATs) and Spontaneous Breathing Trials (SBTs) Plan to perform spontaneous awakening trial today? Yes   Plan to perform spontaneous breathing trial today? Yes   Obvious barriers to extubation? NA   C: Choice of Sedation RASS Goal: 0 Alert and Calm  Need for changes to sedation or analgesia regimen? No   D: Delirium CAM-ICU: Unable to perform secondary to Acute cognitive dysfunction   E: Early Mobility  Plan for early mobility? NA   F: Family Engagement Plan for family engagement today? Yes       Antibiotic Review: Patient on appropriate coverage based on culture data.  and Awaiting culture results.     Review of Invasive Devices:    Sheppard Plan: Continue for accurate I/O monitoring for 48 hours  Central access plan:  port    Prophylaxis:  VTE VTE covered by:    None       Stress Ulcer  not ordered         24 Hour Events : 39 YOM hospital D2 with LLL PNA, worsening encephalopathy with agitation. On Vancomycin in setting of staph epi in BC, 2nd blood culture pending. Speciation of sputum pending. Eliquis held in setting of hematuria. Failed trach collar wean x2 yesterday. Rested comfortably overnight on ACVC with minimal settings. No additional events overnight.    Subjective   Review of Systems: Review of Systems   Unable to perform ROS: Other (chronic trach)       Objective :                   Vitals  I/O      Most Recent Min/Max in 24hrs   Temp (!) 97 °F (36.1 °C) Temp  Min: 97 °F (36.1 °C)  Max: 98.4 °F (36.9 °C)   Pulse 89 Pulse  Min: 48  Max: 97   Resp 18 Resp  Min: 14  Max: 42   /60 BP  Min: 89/50  Max: 154/80   O2 Sat 99 % SpO2  Min: 96 %  Max: 100 %      Intake/Output Summary (Last 24 hours) at 12/18/2024 0023  Last data filed at 12/17/2024 2000  Gross per 24 hour   Intake 265.83 ml   Output 645 ml   Net -379.17 ml       Diet Enteral/Parenteral; Tube Feeding No Oral Diet; Jevity 1.5; Continuous; 70; 200; Water; Every 4 hours    Invasive Monitoring   N/a        Physical Exam   Physical Exam  Vitals and nursing note reviewed.   Eyes:      Extraocular Movements: Extraocular movements intact.      Pupils: Pupils are equal, round, and reactive to light.   Skin:     General: Skin is warm and dry.      Capillary Refill: Capillary refill takes less than 2 seconds.   HENT:      Head: Normocephalic and atraumatic.      Right Ear: Hearing normal.      Left Ear: Hearing normal.      Mouth/Throat:      Mouth: Mucous membranes are moist.   Cardiovascular:      Rate and Rhythm: Normal rate and regular rhythm.      Pulses: Normal pulses.      Heart sounds: Normal heart sounds.   Musculoskeletal:      Right lower leg: Trace Edema present.      Left lower leg: Trace Edema present.   Abdominal:      Palpations: Abdomen is soft.      Tenderness: There is no abdominal tenderness. There is no guarding.   Constitutional:       Appearance: He is well-developed and well-nourished. He is not ill-appearing.   Pulmonary:      Effort: Tachypnea present.      Breath sounds: Rhonchi present. No rales.   Neurological:      GCS: GCS eye subscore is 4. GCS verbal subscore is 1. GCS motor subscore is 5.      Comments: Ocassionally following commands          Diagnostic Studies        Lab Results: I have reviewed the following results:     Medications:  Scheduled PRN   acetylcysteine, 3 mL, Q8H  albuterol, 2.5 mg, Q8H  chlorhexidine,  15 mL, Q12H ISAEL  lidocaine (PF), ,   vancomycin, 1,750 mg, Q12H      acetaminophen, 1,000 mg, Q6H PRN  lidocaine (PF), ,   LORazepam, 2 mg, Q4H PRN       Continuous          Labs:   CBC    Recent Labs     12/16/24 0822 12/17/24 0441   WBC 13.23* 11.52*   HGB 11.3* 11.0*   HCT 37.3 36.0*    206     BMP    Recent Labs     12/17/24 0009 12/17/24 0441   SODIUM 151* 153*   K 3.6 3.6   * 116*   CO2 30 30   AGAP 4 7   BUN 18 17   CREATININE 0.67 0.66   CALCIUM 8.7 8.9       Coags    No recent results     Additional Electrolytes  Recent Labs     12/16/24 0822 12/17/24 0441   MG 2.6 2.4   PHOS 3.2 2.2*   CAIONIZED 1.14  --           Blood Gas    No recent results  No recent results LFTs  Recent Labs     12/16/24 0822   ALT 24   AST 18   ALKPHOS 95   ALB 3.7   TBILI 0.48       Infectious  Recent Labs     12/16/24 0822 12/17/24  0441   PROCALCITONI 0.11 0.32*     Glucose  Recent Labs     12/16/24  0822 12/16/24  1700 12/17/24  0009 12/17/24  0441   GLUC 75 108 102 88

## 2024-12-18 NOTE — PROGRESS NOTES
Hemanth Swanson is a 39 y.o. male who is currently ordered Vancomycin IV with management by the Pharmacy Consult service.  Relevant clinical data and objective / subjective history reviewed.  Vancomycin Assessment:  Indication and Goal AUC/Trough: Bacteremia (goal -600, trough >10), -600, trough >10  Clinical Status: stable  Micro:     Renal Function:  SCr: 0.57 mg/dL  CrCl: 164.5 mL/min  Renal replacement: Not on dialysis  Days of Therapy: 2  Current Dose: 2000 mg IV x 1 dose (loading dose)  Vancomycin Plan:  New Dosin mg IV q12h  Estimated AUC: 489 mcg*hr/mL  Estimated Trough: 14.2 mcg/mL  Next Level: 24 at 0600  Renal Function Monitoring: Daily BMP and UOP  Pharmacy will continue to follow closely for s/sx of nephrotoxicity, infusion reactions and appropriateness of therapy.  BMP and CBC will be ordered per protocol. We will continue to follow the patient’s culture results and clinical progress daily.    Dariela Osullivan, Pharmacist

## 2024-12-18 NOTE — ASSESSMENT & PLAN NOTE
Presents with sodium of 162  Per chart review appears patient was on p.o. regimen  Repeat sodium down to 153  Goal sodium 150-152  Continue FWF via TFs  Consider escalating FWF vs restarting D5 pending repeat NA

## 2024-12-18 NOTE — ASSESSMENT & PLAN NOTE
S/p trach for NM disease   Trach 6XL  History of significant mucous plugs  CT chest revealing Complete collapse of the left lower lobe and severe atelectasis of the right lower lobe similar to prior examination. Superimposed aspiration or pneumonia cannot be excluded. Trace left pleural effusion.     Plan:  Continue ACVC 14/500/40/6+  Failed trach collar x2 on 12/17  Continue Vancomycin D2  Agressive pulmonary hygiene   Mucomyst, albuterol  Chest therapy  Frequent suctioning/turning

## 2024-12-19 ENCOUNTER — APPOINTMENT (INPATIENT)
Dept: RADIOLOGY | Facility: HOSPITAL | Age: 39
DRG: 137 | End: 2024-12-19
Payer: COMMERCIAL

## 2024-12-19 LAB
ANION GAP SERPL CALCULATED.3IONS-SCNC: 5 MMOL/L (ref 4–13)
BACTERIA BLD CULT: ABNORMAL
BACTERIA SPT RESP CULT: ABNORMAL
BACTERIA SPT RESP CULT: ABNORMAL
BASOPHILS # BLD AUTO: 0.03 THOUSANDS/ÂΜL (ref 0–0.1)
BASOPHILS NFR BLD AUTO: 0 % (ref 0–1)
BUN SERPL-MCNC: 13 MG/DL (ref 5–25)
CALCIUM SERPL-MCNC: 8.8 MG/DL (ref 8.4–10.2)
CHLORIDE SERPL-SCNC: 111 MMOL/L (ref 96–108)
CO2 SERPL-SCNC: 28 MMOL/L (ref 21–32)
CREAT SERPL-MCNC: 0.51 MG/DL (ref 0.6–1.3)
EOSINOPHIL # BLD AUTO: 0.2 THOUSAND/ÂΜL (ref 0–0.61)
EOSINOPHIL NFR BLD AUTO: 2 % (ref 0–6)
ERYTHROCYTE [DISTWIDTH] IN BLOOD BY AUTOMATED COUNT: 15.1 % (ref 11.6–15.1)
GFR SERPL CREATININE-BSD FRML MDRD: 137 ML/MIN/1.73SQ M
GLUCOSE SERPL-MCNC: 94 MG/DL (ref 65–140)
GRAM STN SPEC: ABNORMAL
HCT VFR BLD AUTO: 39.2 % (ref 36.5–49.3)
HGB BLD-MCNC: 11.8 G/DL (ref 12–17)
IMM GRANULOCYTES # BLD AUTO: 0.06 THOUSAND/UL (ref 0–0.2)
IMM GRANULOCYTES NFR BLD AUTO: 1 % (ref 0–2)
LYMPHOCYTES # BLD AUTO: 1.31 THOUSANDS/ÂΜL (ref 0.6–4.47)
LYMPHOCYTES NFR BLD AUTO: 15 % (ref 14–44)
MAGNESIUM SERPL-MCNC: 2.2 MG/DL (ref 1.9–2.7)
MCH RBC QN AUTO: 29.8 PG (ref 26.8–34.3)
MCHC RBC AUTO-ENTMCNC: 30.1 G/DL (ref 31.4–37.4)
MCV RBC AUTO: 99 FL (ref 82–98)
MECA+MECC ISLT/SPM QL: DETECTED
MONOCYTES # BLD AUTO: 0.54 THOUSAND/ÂΜL (ref 0.17–1.22)
MONOCYTES NFR BLD AUTO: 6 % (ref 4–12)
NEUTROPHILS # BLD AUTO: 6.4 THOUSANDS/ÂΜL (ref 1.85–7.62)
NEUTS SEG NFR BLD AUTO: 76 % (ref 43–75)
NRBC BLD AUTO-RTO: 0 /100 WBCS
PHOSPHATE SERPL-MCNC: 4 MG/DL (ref 2.7–4.5)
PLATELET # BLD AUTO: 200 THOUSANDS/UL (ref 149–390)
PMV BLD AUTO: 11 FL (ref 8.9–12.7)
POTASSIUM SERPL-SCNC: 4.1 MMOL/L (ref 3.5–5.3)
PROCALCITONIN SERPL-MCNC: 0.31 NG/ML
RBC # BLD AUTO: 3.96 MILLION/UL (ref 3.88–5.62)
S EPIDERMIDIS DNA BLD POS QL NAA+NON-PRB: DETECTED
SODIUM SERPL-SCNC: 144 MMOL/L (ref 135–147)
WBC # BLD AUTO: 8.54 THOUSAND/UL (ref 4.31–10.16)

## 2024-12-19 PROCEDURE — 84145 PROCALCITONIN (PCT): CPT

## 2024-12-19 PROCEDURE — 80048 BASIC METABOLIC PNL TOTAL CA: CPT | Performed by: NURSE PRACTITIONER

## 2024-12-19 PROCEDURE — 94003 VENT MGMT INPAT SUBQ DAY: CPT

## 2024-12-19 PROCEDURE — NC001 PR NO CHARGE: Performed by: STUDENT IN AN ORGANIZED HEALTH CARE EDUCATION/TRAINING PROGRAM

## 2024-12-19 PROCEDURE — 84100 ASSAY OF PHOSPHORUS: CPT | Performed by: NURSE PRACTITIONER

## 2024-12-19 PROCEDURE — 94640 AIRWAY INHALATION TREATMENT: CPT

## 2024-12-19 PROCEDURE — 83735 ASSAY OF MAGNESIUM: CPT | Performed by: NURSE PRACTITIONER

## 2024-12-19 PROCEDURE — 85025 COMPLETE CBC W/AUTO DIFF WBC: CPT

## 2024-12-19 PROCEDURE — 97163 PT EVAL HIGH COMPLEX 45 MIN: CPT

## 2024-12-19 PROCEDURE — 94150 VITAL CAPACITY TEST: CPT

## 2024-12-19 PROCEDURE — 94664 DEMO&/EVAL PT USE INHALER: CPT

## 2024-12-19 PROCEDURE — 94669 MECHANICAL CHEST WALL OSCILL: CPT

## 2024-12-19 PROCEDURE — 71045 X-RAY EXAM CHEST 1 VIEW: CPT

## 2024-12-19 PROCEDURE — 97110 THERAPEUTIC EXERCISES: CPT

## 2024-12-19 PROCEDURE — 94760 N-INVAS EAR/PLS OXIMETRY 1: CPT

## 2024-12-19 PROCEDURE — 99232 SBSQ HOSP IP/OBS MODERATE 35: CPT | Performed by: STUDENT IN AN ORGANIZED HEALTH CARE EDUCATION/TRAINING PROGRAM

## 2024-12-19 PROCEDURE — 94668 MNPJ CHEST WALL SBSQ: CPT

## 2024-12-19 RX ORDER — LORAZEPAM 2 MG/ML
INJECTION INTRAMUSCULAR
Status: DISCONTINUED
Start: 2024-12-19 | End: 2024-12-19 | Stop reason: WASHOUT

## 2024-12-19 RX ADMIN — IPRATROPIUM BROMIDE AND ALBUTEROL SULFATE 3 ML: .5; 3 SOLUTION RESPIRATORY (INHALATION) at 19:43

## 2024-12-19 RX ADMIN — ACETYLCYSTEINE 600 MG: 200 SOLUTION ORAL; RESPIRATORY (INHALATION) at 19:43

## 2024-12-19 RX ADMIN — CEFEPIME HYDROCHLORIDE 2000 MG: 2 INJECTION, SOLUTION INTRAVENOUS at 02:06

## 2024-12-19 RX ADMIN — CHLORHEXIDINE GLUCONATE 15 ML: 1.2 RINSE ORAL at 21:13

## 2024-12-19 RX ADMIN — CEFEPIME HYDROCHLORIDE 2000 MG: 2 INJECTION, SOLUTION INTRAVENOUS at 14:50

## 2024-12-19 RX ADMIN — ACETYLCYSTEINE 600 MG: 200 SOLUTION ORAL; RESPIRATORY (INHALATION) at 07:46

## 2024-12-19 RX ADMIN — IPRATROPIUM BROMIDE AND ALBUTEROL SULFATE 3 ML: .5; 3 SOLUTION RESPIRATORY (INHALATION) at 02:57

## 2024-12-19 RX ADMIN — ACETYLCYSTEINE 600 MG: 200 SOLUTION ORAL; RESPIRATORY (INHALATION) at 02:57

## 2024-12-19 RX ADMIN — IPRATROPIUM BROMIDE AND ALBUTEROL SULFATE 3 ML: .5; 3 SOLUTION RESPIRATORY (INHALATION) at 13:50

## 2024-12-19 RX ADMIN — APIXABAN 5 MG: 5 TABLET, FILM COATED ORAL at 12:30

## 2024-12-19 RX ADMIN — CHLORHEXIDINE GLUCONATE 15 ML: 1.2 RINSE ORAL at 09:33

## 2024-12-19 RX ADMIN — IPRATROPIUM BROMIDE AND ALBUTEROL SULFATE 3 ML: .5; 3 SOLUTION RESPIRATORY (INHALATION) at 07:46

## 2024-12-19 RX ADMIN — APIXABAN 5 MG: 5 TABLET, FILM COATED ORAL at 17:24

## 2024-12-19 RX ADMIN — ACETYLCYSTEINE 600 MG: 200 SOLUTION ORAL; RESPIRATORY (INHALATION) at 13:50

## 2024-12-19 NOTE — PROGRESS NOTES
Critical Care Attending Note; John Sutherland   Note Date: 24  Note Time: 11:21 AM    Patient: Hemanth Swanson  Age, : 39 y.o., 1985 MRN: [id number] Code Status: Level 1 - Full Code Patient Location: ICU 05/ICU 05   Hospital LOS:3 days  ]   Patient seen and examined, medical record reviewed, discussed with house staff and nursing staff.     HPI   CC: AMS  39M with a PMH Seminoma(Orcetomy/Chemo), HTN, HFpEF, DM, Cardiac Arrest(Hypoxic - ), PE, Demyelinating Neuromuscular Disorder(Trach/Peg), recurrent PEG tube dislodgement who presents due to agitation found to have hypernatremia.       Main ICU Plans:       #Neuro  Demyelinating NM disorder/Agitation - Baseline able to eat  - Delirium precautions  - PRN Haldol    #Card  Bradycardia - likely secondary to vagaling   - Tele     #Pulm  Respiratory Failure - Mucous plugging - improving  - Aggressive Pulm hygiene regimen - mucomyst, albuterol, chest PT  - LTVV VC - 6-8cc/kg IDBW - TCT daily  - Wean FiO2 - Maintain Oxygen Sat >92%  - VAP Bundle  - HOB > 30o    - PEEP Titrate      Pulmonary embolism  -Eliquis    #Renal  Hypernatremia - Resolved  - FWF    Hematuria - Likely traumatic from puga  - Monitor  - Puga     #GI  Bowel regimn    #ID   Bronchitis - Pseudomonas/Serratia  - Cefepime - 2/5 days  - Follow up infectious workup      #DVT/GI ppx  Eliquis    #Lines/Tubes/Drains:   Invasive Devices       Central Venous Catheter Line  Duration             Port A Cath 23 Right Subclavian 646 days              Peripheral Intravenous Line  Duration             Peripheral IV 12/15/24 Distal;Right;Ventral (anterior) Forearm 3 days    Peripheral IV 24 Proximal;Right;Upper;Ventral (anterior) Arm 3 days              Drain  Duration             Gastrostomy/Enterostomy Percutaneous Interventional Gastrostomy 18 Fr.  days    Urethral Catheter Temperature probe 16 Fr. 3 days              Airway  Duration             Surgical Airway Shiley Distal  49 days                    #Nutrition:   Diet Enteral/Parenteral; Tube Feeding No Oral Diet; Jevity 1.5; Continuous; 70; 150; Water; Every 4 hours        #Code Status:   Level 1 - Full Code    #Dispo:   ICU        John Sutherland MD  Pulmonary, Critical Care    Critical care time, excluding procedures, teaching, family meetings, and excludes any prior time recorded by the AP/resident, 35 minutes. Upon my evaluation, this patient has a high probability of imminent or life-threatening deterioration due to above problems which required my direct attention, intervention, and personal management.   Impression/Active Problems:     HTN   HFpEF   DM   Neuromuscualr disorder   Tracheostomy   PEG   Hypernatremia   Aspiration   Vaso vagal    Bradycardia  AMS  Bronchitis       Physical Exam:     Vital Signs:   Weight: 98.5 kg (217 lb 2.5 oz)  IBW: Ideal body weight: 86.8 kg (191 lb 5.7 oz)  Adjusted ideal body weight: 91.5 kg (201 lb 10.8 oz)  Temp:  [95.7 °F (35.4 °C)-99.3 °F (37.4 °C)] 96.3 °F (35.7 °C)  HR:  [] 65  BP: ()/(50-94) 108/71  Resp:  [14-31] 15  SpO2:  [74 %-100 %] 97 %  O2 Device: Ventilator  FiO2 (%):  [30-50] 30  Physical Exam: Unchanged  General: NAD  Neuro: Nodding appropriately   Heart: RRR  Lungs: Non labored  Abdomen: Soft  Extremities: Min edema                Ventilator Settings:    FiO2 (%):  [30-50] 30    Results from last 7 days   Lab Units 12/15/24  2350   PO2 SYLVESTER mm Hg 58.8*     Radiologic Images Reviewed:    CT C/A/P  1.  Complete collapse of the left lower lobe and severe atelectasis of the right lower lobe similar to prior examination. Superimposed aspiration or pneumonia cannot be excluded. Trace left pleural effusion.   2.  2 mm calculus in the distal right ureter just prior to the ureterovesicular junction. No significant hydronephrosis.   3.  Mild thickening of the urinary bladder wall, correlate with urinalysis for cystitis.     CT head  No acute intracranial hemorrhage,  "midline shift, or mass effect.   Input / Output:     Intake/Output Summary (Last 24 hours) at 12/19/2024 1121  Last data filed at 12/19/2024 0600  Gross per 24 hour   Intake 2450 ml   Output 750 ml   Net 1700 ml            Infusions:       Scheduled Medications:  Current Facility-Administered Medications   Medication Dose Route Frequency Provider Last Rate    acetaminophen  1,000 mg Intravenous Q8H PRN Julissa Spironello V, CRNP      acetylcysteine  3 mL Nebulization Q6H Julissa Spironello V, CRNP      cefepime  2,000 mg Intravenous Q12H Julissa Spironello V, CRNP Stopped (12/19/24 0336)    chlorhexidine  15 mL Mouth/Throat Q12H ISAEL Tiffani Wellerne, CRNP      ipratropium-albuterol  3 mL Nebulization Q6H Julissa Spironello V, CRNP         PRN Medications:    acetaminophen    Labs Reviewed:  Results from last 7 days   Lab Units 12/19/24  0546 12/18/24  0201 12/17/24  0441   WBC Thousand/uL 8.54 7.80 11.52*   HEMOGLOBIN g/dL 11.8* 10.5* 11.0*   HEMATOCRIT % 39.2 34.7* 36.0*   PLATELETS Thousands/uL 200 186 206      Results from last 7 days   Lab Units 12/19/24  0545 12/18/24  0201 12/17/24  0441   SODIUM mmol/L 144 150* 153*   CO2 mmol/L 28 26 30   BUN mg/dL 13 14 17   CALCIUM mg/dL 8.8 8.7 8.9   MAGNESIUM mg/dL 2.2 2.3 2.4   PHOSPHORUS mg/dL 4.0 3.3 2.2*         Invalid input(s): \"ASTSGOT\", \"ALTSGPT\"LABRCNTIP@ ,alkphos:3,tbilirubin:3,dbilirubin:3)@  Results from last 7 days   Lab Units 12/15/24  2343   INR  1.19           Invalid input(s): \"TROPT\", \"PBNP\"             I have personally seen and examined the patient on (12/19/24 between 0903-1003). I discussed the patient with the AP/resident including, but not limited to, verifying findings; reviewing labs and x-rays; discussing with consultants; developing the plan of care with the bedside nurse; and discussing treatment plan with patient or surrogate.  I have reviewed the note and assessment performed by the AP/resident and agree with the AP/resident’s " documented findings and plan of care with the above additions/exceptions. Please see my comments for details and adjustments.

## 2024-12-19 NOTE — ASSESSMENT & PLAN NOTE
Patient with history of waxing and waning agitation   Required 8 mg IM Versed + 2 mg IV versed at SLE  CT negative  UDS + THC, benzos   MRI during previous admission revealed images consistent with anoxic brain injury    Plan:  PRN ativan for agitation   Limit sedatives that can cause bradycardia; as patient has history of vagal/bradycardiac events   Delirium precautions  Infectious work-up as above- encephalopathy improving with treatment  Correct sodium as outlined

## 2024-12-19 NOTE — ASSESSMENT & PLAN NOTE
Tachypnea, leukocytosis. HD stable. Lactic 3.1-> 1  ED course: 1L Isolyte, Zosyn, Vanco  Source: Pulmonary  CT chest: complete collapse of the left lower lobe and severe atelectasis of the right lower lobe similar to prior examination. Superimposed aspiration or pneumonia cannot be excluded. Trace left pleural effusion   Sputum culture: pseudomonas and serratia- sensitivities pending  1 of 2 BC's 12/16 + staph epi- likely contaminant  HD stable  Ongoing hydration via PEG    Plan  Treatment for pneumonia as above  Trend CBC, temperature curve

## 2024-12-19 NOTE — OCCUPATIONAL THERAPY NOTE
OT EVALUATION       12/19/24 9523   Note Type   Note type Screen   Additional Comments Patient is dependent at baseline for ADLS and IADLS.  Patient is a nicky lift transfer at baseline and has a trach and PEG.  No skilled OT needs required at this time.   Discharge Recommendation   Rehab Resource Intensity Level, OT No post-acute rehabilitation needs   Licensure   [place] License Number  Lorene Escobar MS OTR/L [id number]

## 2024-12-19 NOTE — PROGRESS NOTES
Progress Note - Critical Care/ICU   Name: Hemanth Swanson 39 y.o. male I MRN: [id number]  Unit/Bed#: ICU 05 I Date of Admission: 12/16/2024   Date of Service: 12/19/2024 I Hospital Day: 3      Assessment & Plan  Toxic metabolic encephalopathy  Patient with history of waxing and waning agitation   Required 8 mg IM Versed + 2 mg IV versed at SLE  CT negative  UDS + THC, benzos   MRI during previous admission revealed images consistent with anoxic brain injury    Plan:  PRN ativan for agitation   Limit sedatives that can cause bradycardia; as patient has history of vagal/bradycardiac events   Delirium precautions  Infectious work-up as above- encephalopathy improving with treatment  Correct sodium as outlined   Acute on chronic respiratory failure with hypoxia and hypercapnia (HCC)  S/p trach for NM disease   Trach 6XL  History of significant mucous plugs  CT chest revealing complete collapse of the left lower lobe and severe atelectasis of the right lower lobe similar to prior examination. Superimposed aspiration or pneumonia cannot be excluded. Trace left pleural effusion.     Plan:  Continue ACVC 14/500/50/6+   Had episodes of hypoxia/mucous plugging yesterday  Aggressive pulmonary hygiene   Mucomyst, albuterol  Chest therapy  Frequent suctioning/turning  Failed trach collar x2 on 12/17  Antibiotics as above  Hypernatremia  Presents with sodium of 162  Per chart review appears patient was on p.o. regimen  Repeat sodium down to 150 yesterday, AM labs pending   Continue FWF via Tfs  Trend BMP  Umbilical hernia without obstruction and without gangrene  Chronic, deemed not a surgical candidate  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HCC)  S/p resection and chemo  S/P percutaneous endoscopic gastrostomy (PEG) tube placement (HCC)  PEG tube re-placed 12/10 after dislodgment  TF restarted, jevity 1.5 at 70/hr   Q4H in setting of hypernatremia  History of pulmonary embolus (PE)  Eliquis on hold given  hematuria  Hypokalemia  Suspect a component of malnutrition  Replete as needed  Maintain K>4  Hematuria  Possibly secondary to traumatic Sheppard insertion?  Holding eliquis acutely, will resume today pending continued improvement   Sepsis (HCC)  Tachypnea, leukocytosis. HD stable. Lactic 3.1-> 1  ED course: 1L Isolyte, Zosyn, Vanco  Source: Pulmonary  CT chest: complete collapse of the left lower lobe and severe atelectasis of the right lower lobe similar to prior examination. Superimposed aspiration or pneumonia cannot be excluded. Trace left pleural effusion   Sputum culture: pseudomonas and serratia- sensitivities pending  1 of 2 BC's 12/16 + staph epi- likely contaminant  HD stable  Ongoing hydration via PEG    Plan  Treatment for pneumonia as above  Trend CBC, temperature curve  Pneumonia due to Pseudomonas and Serratia  12/16 CT chest revealing complete collapse of the left lower lobe and severe atelectasis of the right lower lobe similar to prior examination. Superimposed aspiration or pneumonia cannot be excluded. Trace left pleural effusion.   12/16 Sputum culture + Pseudomonas and Serratia- sensitivities pending    Plan  Cefepime D2, Antibiotic D3  Aggressive chest PT, mucomyst/albuterol  Rest of plan as outlined below    Mixed axonal-demyelinating polyneuropathy  Trach and PEG in place  PT/OT  Disposition: Critical care    ICU Core Measures     Vented Patient  VAP Bundle  VAP bundle ordered     A: Assess, Prevent, and Manage Pain Has pain been assessed? Yes  Need for changes to pain regimen? No   B: Both Spontaneous Awakening Trials (SATs) and Spontaneous Breathing Trials (SBTs) Plan to perform spontaneous awakening trial today? Chronic vent   Plan to perform spontaneous breathing trial today? Chronic vent   Obvious barriers to extubation? NA   C: Choice of Sedation RASS Goal: N/A patient not on sedation  Need for changes to sedation or analgesia regimen? NA   D: Delirium CAM-ICU: Negative- nods  appropriately to orientation questions   E: Early Mobility  Plan for early mobility? Yes   F: Family Engagement Plan for family engagement today? Yes       Antibiotic Review: Awaiting culture results.  and Continue broad spectrum secondary to severity of illness.     Review of Invasive Devices:    Sheppard Plan: Sheppard to be removed. Order has been placed  Central access plan:  SQ port in place- not currently accessed      Prophylaxis:  VTE VTE covered by:    None       Stress Ulcer  not ordered         24 Hour Events : Encephalopathy improved, now more calm and cooperative. Had multiple episodes of mucous plugging with hypoxia and diaphoresis yesterday. Required transient escalation of FiO2 on vent, now down to 30%. Continues on aggressive mucokinesis/chest PT. Hematuria has improved.     Subjective   Review of Systems: See HPI for Review of Systems    Objective :                   Vitals I/O      Most Recent Min/Max in 24hrs   Temp (!) 97 °F (36.1 °C) Temp  Min: 97 °F (36.1 °C)  Max: 99.3 °F (37.4 °C)   Pulse 80 Pulse  Min: 48  Max: 107   Resp 16 Resp  Min: 14  Max: 22   BP 95/54 BP  Min: 87/48  Max: 137/82   O2 Sat 100 % SpO2  Min: 89 %  Max: 100 %      Intake/Output Summary (Last 24 hours) at 12/19/2024 0011  Last data filed at 12/18/2024 2201  Gross per 24 hour   Intake 2316 ml   Output 800 ml   Net 1516 ml       Diet Enteral/Parenteral; Tube Feeding No Oral Diet; Jevity 1.5; Continuous; 70; 250; Water; Every 4 hours    Invasive Monitoring   N/A        Physical Exam   Physical Exam  Vitals and nursing note reviewed.   Eyes:      General: Lids are normal.      Extraocular Movements: Extraocular movements intact.   Skin:     General: Skin is warm and dry.      Coloration: Skin is pale.   HENT:      Head: Normocephalic and atraumatic.      Mouth/Throat:      Mouth: Mucous membranes are moist.   Neck:      Comments: Trach in place, connected to vent  Cardiovascular:      Rate and Rhythm: Normal rate and regular rhythm.    Musculoskeletal:      Right lower leg: No edema.      Left lower leg: No edema.   Abdominal:      Palpations: Abdomen is soft.      Comments: PEG in place, TF running at goal.    Constitutional:       General: He is not in acute distress.     Appearance: He is well-developed. He is ill-appearing.   Pulmonary:      Effort: Pulmonary effort is normal. No accessory muscle usage, respiratory distress or accessory muscle usage.      Comments: Coarse/rhonchorous breath sounds, diminished at bases. Poor inspiratory effort.    Psychiatric:         Attention and Perception: Attention normal.         Mood and Affect: Affect is flat.         Behavior: Behavior is cooperative.   Neurological:      General: No focal deficit present.      Mental Status: He is alert.      GCS: GCS eye subscore is 4. GCS verbal subscore is 1. GCS motor subscore is 6.      Motor: Weakness (genearlized. Unable to hold extremities against gravity).      Comments: Nods appropriately to orientation questions    Genitourinary/Anorectal:     Comments: Urinary catheter patent for clear yellow urine   Sheppard present.        Diagnostic Studies        Lab Results: I have reviewed the following results: see below  EKG: NSR on monitor, rate 60-70s.  Imaging: XR chest portable ICU  Narrative: XR CHEST PORTABLE ICU    INDICATION: hypoxia, mucus plugging.    COMPARISON: 12/15/2024    FINDINGS: Right-sided infusion port catheter device and tracheostomy appear stable. Artifacts project over the chest and multiple locations. Low lung volumes.    Platelike atelectasis in the left lateral midlung field. Lungs otherwise grossly clear. No pneumothorax or pleural effusion.    Normal cardiomediastinal silhouette.    Bones are unremarkable for age.    Normal upper abdomen.  Impression: Platelike atelectasis left midlung field. Stable line and tube positioning. Low lung volumes.    Workstation performed: [id number]         Medications:  Scheduled PRN   acetylcysteine, 3 mL,  Q6H  cefepime, 2,000 mg, Q12H  chlorhexidine, 15 mL, Q12H ISAEL  ipratropium-albuterol, 3 mL, Q6H      acetaminophen, 1,000 mg, Q8H PRN       Continuous          Labs:   CBC    Recent Labs     12/17/24 0441 12/18/24  0201   WBC 11.52* 7.80   HGB 11.0* 10.5*   HCT 36.0* 34.7*    186     BMP    Recent Labs     12/17/24 0441 12/18/24  0201   SODIUM 153* 150*   K 3.6 3.4*   * 117*   CO2 30 26   AGAP 7 7   BUN 17 14   CREATININE 0.66 0.57*   CALCIUM 8.9 8.7       Coags    No recent results     Additional Electrolytes  Recent Labs     12/17/24 0441 12/18/24  0201   MG 2.4 2.3   PHOS 2.2* 3.3          Blood Gas    No recent results  No recent results LFTs  No recent results    Infectious  Recent Labs     12/17/24 0441 12/18/24  0201   PROCALCITONI 0.32* 0.47*     Glucose  Recent Labs     12/17/24 0441 12/18/24  0201   GLUC 88 96

## 2024-12-19 NOTE — PHYSICAL THERAPY NOTE
PHYSICAL THERAPY EVALUATION/TREATMENT     12/19/24 1100   PT Last Visit   PT Visit Date 12/19/24   Note Type   Note type Evaluation   Pain Assessment   Pain Assessment Tool Choi-Baker FACES   Choi-Baker FACES Pain Rating 4  (Bilateral knee areas with range of motion)   Restrictions/Precautions   Other Precautions Cognitive;Chair Alarm;Bed Alarm;Fall Risk;Pain;Multiple lines;O2  (trach and PEG tube)   Home Living   Type of Home House   Home Layout Two level;Ramped entrance   Home Equipment Wheelchair-manual;Hospital bed;Mechanical lift  (wallace lift and ventilator)   Prior Function   Level of [place] Needs assistance with ADLs;Needs assistance with IADLS;Needs assistance with functional mobility   Lives With Family   Receives Help From Family;Home health   IADLs Family/Friend/Other provides meals;Family/Friend/Other provides medication management   Comments Patient wheelchair/bedbound prior to admission 1 dependent with PEG tube, requiring assist of 2 persons for transfers out of bed to chair also having a Wallace lift at home.  Patient dependent for ADLs   General   Additional Pertinent History Chart reviewed, patient admitted with pneumonia.  Patient with extensive medical history and is wheelchair/bedbound at home prior to admission with trach and PEG tube in place.  As per past documentation patient was requiring assist of 2 persons for transfers out of bed to wheelchair prior to admission   Family/Caregiver Present No   Cognition   Overall Cognitive Status Impaired   Arousal/Participation Cooperative   Orientation Level Oriented to person  (Patient makes eye contact when name is stated otherwise inconsistent with yes no answers for questions of orientation and function)   Following Commands Follows one step commands with increased time or repetition   Subjective   Subjective Patient nonverbal with trach   RLE Assessment   RLE Assessment   (ROM WFL, limited hip IR, -15degrees knee extension, strength  grossly 2-/5)   LLE Assessment   LLE Assessment   (ROM WFL with limited hip IR,minus 25degrees knee extension, strength 2-/2)   Coordination   Movements are Fluid and Coordinated 0   Coordination and Movement Description limited active movement and strength in BLEs   Bed Mobility   Rolling R 2  Maximal assistance   Rolling L 2  Maximal assistance   Additional Comments patient seen in ICU with vent, PEG tube and extended history of WC/Bed bound medical history. Unable to assess OOB at this time. Patient is also a questionable historian, as per documentation, patient required max assist of 2 for transfers or nicky lift prior to admission   Activity Tolerance   Activity Tolerance Patient limited by fatigue;Treatment limited secondary to medical complications (Comment)  (limited by weakness)   Nurse Made Aware yes   Assessment   Prognosis Fair   Problem List Decreased strength;Decreased range of motion;Decreased endurance;Impaired balance;Decreased mobility;Decreased coordination;Decreased cognition;Impaired tone  (trach and PEG)   Assessment Patient seen for Physical Therapy evaluation. Patient admitted with Pneumonia due to Pseudomonas (HCC).  Comorbidities affecting patient's physical performance include: .  Personal factors affecting patient at time of initial evaluation include: lives in two story house, inability to ambulate household distances, inability to navigate community distances, inability to navigate level surfaces without external assistance, inability to perform dynamic tasks in community, inability to perform physical activity, inability to perform ADLS, inability to perform IADLS , and inability to live alone. Prior to admission, patient was requiring assist for functional mobility with wc, requiring assist for ADLS, requiring assist for IADLS, living in a multi-level home, having 24 hour care , home with family assist, and is wheelchair bound.  Please find objective findings from Physical Therapy  assessment regarding body systems outlined above with impairments and limitations including weakness, decreased ROM, impaired balance, decreased endurance, impaired coordination, gait deviations, pain, decreased activity tolerance, decreased functional mobility tolerance, fall risk, SOB upon exertion, and with trach/vent and PEG .  The Barthel Index was used as a functional outcome tool presenting with a score of Barthel Index Score: 5 today indicating marked limitations of functional mobility and ADLS.  Patient's clinical presentation is currently unstable/unpredictable as seen in patient's presentation of vital sign response, changing level of pain, varying levels of cognitive performance, increased fall risk, new onset of impairment of functional mobility, decreased endurance, and new onset of weakness. Pt would benefit from continued Physical Therapy treatment to address deficits as defined above and maximize level of functional mobility. As demonstrated by objective findings, the assigned level of complexity for this evaluation is high.The patient's AM-PAC Basic Mobility Inpatient Short Form Raw Score is 6. A Raw score of less than or equal to 16 suggests the patient may benefit from discharge to post-acute rehabilitation services although patient with 24-hour care in the home with paid caregivers all prior to admission. Please also refer to the recommendation of the Physical Therapist for safe discharge planning.   Goals   Patient Goals none stated, patient nonverbal with trach   STG Expiration Date 12/26/24   Short Term Goal #1 bed mobility with mod assist   Short Term Goal #2 increase Bknee extension by 10 degrees   LTG Expiration Date 01/02/25   Long Term Goal #1 strength BLEs 2+/5   Long Term Goal #2 assess sitting tolerance and increase unsupported sitting time to up to 3-5min as tolerated   Plan   Treatment/Interventions ADL retraining;Functional transfer training;LE strengthening/ROM;Therapeutic  exercise;Endurance training;Cognitive reorientation;Patient/family training;Equipment eval/education;Bed mobility;Compensatory technique education   PT Frequency 1-2x/wk   Discharge Recommendation   Rehab Resource Intensity Level, PT III (Minimum Resource Intensity)   AM-PAC Basic Mobility Inpatient   Turning in Flat Bed Without Bedrails 1   Lying on Back to Sitting on Edge of Flat Bed Without Bedrails 1   Moving Bed to Chair 1   Standing Up From Chair Using Arms 1   Walk in Room 1   Climb 3-5 Stairs With Railing 1   Basic Mobility Inpatient Raw Score 6   Turning Head Towards Sound 3   Follow Simple Instructions 2   Low Function Basic Mobility Raw Score  11   Low Function Basic Mobility Standardized Score  16.55   [facility] Highest Level Of Mobility   -[facility] Goal 2: Bed activities/Dependent transfer   -[facility] Achieved 2: Bed activities/Dependent transfer   Barthel Index   Feeding 0   Bathing 0   Grooming Score 0   Dressing Score 0   Bladder Score 0   Bowels Score 5   Toilet Use Score 0   Transfers (Bed/Chair) Score 0   Mobility (Level Surface) Score 0   Stairs Score 0   Barthel Index Score 5   Additional Treatment Session   Start Time 1045   End Time 1100   Treatment Assessment s: patient nonverbal with trach/vent O: BLE exercise completed as listed below A: Patient will benefit from trial of physical therapy to determine benefit and ability to improve sitting and transfer function.   Exercises   Hamstring Stretch Supine;5 reps;PROM;Bilateral   Heelslides Supine;10 reps;AROM;AAROM;Bilateral   Hip Abduction Supine;5 reps;AAROM;Bilateral   Knee AROM Short Arc Quad Supine;5 reps;AAROM;Bilateral   Ankle Pumps Supine;5 reps;Bilateral   Heel Cord Stretch Supine;5 reps;PROM;Bilateral   Licensure   [place] License Number  Maureen Lopes, PT 4 0 [id number]

## 2024-12-19 NOTE — ASSESSMENT & PLAN NOTE
12/16 CT chest revealing complete collapse of the left lower lobe and severe atelectasis of the right lower lobe similar to prior examination. Superimposed aspiration or pneumonia cannot be excluded. Trace left pleural effusion.   12/16 Sputum culture + Pseudomonas and Serratia- sensitivities pending    Plan  Cefepime D2, Antibiotic D3  Aggressive chest PT, mucomyst/albuterol  Rest of plan as outlined below

## 2024-12-19 NOTE — ASSESSMENT & PLAN NOTE
Possibly secondary to traumatic Sheppard insertion?  Holding eliquis acutely, will resume today pending continued improvement

## 2024-12-19 NOTE — UTILIZATION REVIEW
Continued Stay Review    Date: 12/19                          Current Patient Class: Inpatient  Current Level of Care: MS     HPI:39 y.o. male initially admitted on  12/16    Assessment/Plan:     12/19 IM Note    Encephalopathy improved, now more calm and cooperative. Had multiple episodes of mucous plugging with hypoxia and diaphoresis yesterday. Required transient escalation of FiO2 on vent, now down to 30%. Continues on aggressive mucokinesis/chest PT. Hematuria has improved. + coarse /rhonchi BS . Peg in place and TF running . Trach in place . Affect is flat .    Medications:   Scheduled Medications:  acetylcysteine, 3 mL, Nebulization, Q6H  cefepime, 2,000 mg, Intravenous, Q12H  chlorhexidine, 15 mL, Mouth/Throat, Q12H ISAEL  ipratropium-albuterol, 3 mL, Nebulization, Q6H      Continuous IV Infusions:     PRN Meds:  acetaminophen, 1,000 mg, Intravenous, Q8H PRN      Discharge Plan: TBD     Vital Signs (last 3 days)       Date/Time Temp Pulse Resp BP MAP (mmHg) SpO2 FiO2 (%) O2 Device Patient Position - Orthostatic VS [place] Coma Scale Score Pain    12/19/24 0748 -- -- -- -- -- 100 % 30 Ventilator -- -- --    12/19/24 0700 96.3 °F (35.7 °C) 54 15 107/58 78 100 % -- -- -- -- --    12/19/24 0600 96.4 °F (35.8 °C) 67 24 91/54 67 97 % -- -- -- -- --    12/19/24 0500 96.4 °F (35.8 °C) 52 18 145/84 110 100 % -- -- -- -- --    12/19/24 0400 96.4 °F (35.8 °C) 57 14 98/67 76 100 % 50 Ventilator Lying 11 No Pain    12/19/24 0300 96.4 °F (35.8 °C) 59 14 106/68 82 98 % -- -- -- -- --    12/19/24 0200 96.6 °F (35.9 °C) 61 14 117/71 88 100 % -- -- -- -- --    12/19/24 0100 97 °F (36.1 °C) 82 21 138/94 112 100 % -- -- -- -- --    12/19/24 0000 97.2 °F (36.2 °C) 71 18 97/56 71 99 % -- -- -- 11 No Pain    12/18/24 2300 97 °F (36.1 °C) 80 16 95/54 71 100 % -- -- -- -- --    12/18/24 2200 97.7 °F (36.5 °C) 77 22 118/56 80 100 % -- -- -- -- --    12/18/24 2100 97.9 °F (36.6 °C) 59 14 117/68 86 100 % -- -- -- -- --    12/18/24 2000  97.9 °F (36.6 °C) 55 19 125/73 94 100 % 50 Ventilator Lying 11 No Pain    12/18/24 1946 -- 74 14 -- -- 100 % 40 Ventilator -- -- No Pain    12/18/24 1900 98.6 °F (37 °C) 73 14 96/54 69 100 % -- -- -- -- --    12/18/24 1800 98.8 °F (37.1 °C) 92 14 100/56 70 99 % -- -- -- -- --    12/18/24 1700 99.3 °F (37.4 °C) 76 17 137/82 102 99 % -- -- -- -- --    12/18/24 1600 99.3 °F (37.4 °C) 107 15 135/51 74 99 % 40 Ventilator Lying 11 --    12/18/24 1500 99 °F (37.2 °C) 85 22 131/72 96 89 % -- -- -- -- --    12/18/24 1411 98.8 °F (37.1 °C) 95 14 94/50 67 99 % -- -- -- -- --    12/18/24 1323 -- -- -- -- -- 96 % 40 Ventilator -- -- --    12/18/24 1300 98.8 °F (37.1 °C) 74 19 125/85 94 100 % -- -- -- -- --    12/18/24 1200 98.1 °F (36.7 °C) 58 15 130/69 92 100 % 40 Ventilator Lying 11 --    12/18/24 1100 97.9 °F (36.6 °C) 57 14 102/56 75 100 % -- -- -- -- --    12/18/24 1000 97.5 °F (36.4 °C) 54 15 111/73 87 100 % -- -- -- -- --    12/18/24 0900 97.5 °F (36.4 °C) 59 19 105/62 78 100 % -- -- -- -- --    12/18/24 0800 97.5 °F (36.4 °C) 69 21 114/59 77 100 % 40 Ventilator Lying 11 --    12/18/24 0738 -- -- -- -- -- 100 % -- Ventilator -- -- --    12/18/24 0700 97.2 °F (36.2 °C) 53 16 96/52 70 100 % -- -- -- -- --    12/18/24 0600 97.2 °F (36.2 °C) 48 16 132/76 99 100 % -- -- -- -- --    12/18/24 0500 97.3 °F (36.3 °C) 61 14 87/48 63 100 % -- Ventilator -- -- --    12/18/24 0400 97.3 °F (36.3 °C) -- -- -- -- -- -- -- -- 11 --    12/18/24 0345 -- -- -- -- -- 100 % 40 Ventilator -- -- --    12/18/24 0300 97.5 °F (36.4 °C) 56 14 102/61 76 100 % -- -- -- -- --    12/18/24 0200 97.5 °F (36.4 °C) 55 14 121/58 83 100 % -- -- -- -- --    12/18/24 0000 97.9 °F (36.6 °C) 100 15 91/50 65 99 % 40 Ventilator -- 11 --    12/17/24 2324 -- 89 18 -- -- 99 % -- -- -- -- --    12/17/24 2300 97 °F (36.1 °C) 51 18 111/60 80 100 % 40 Ventilator -- -- --    12/17/24 2200 97 °F (36.1 °C) 55 14 93/54 69 100 % -- -- -- -- --    12/17/24 2100 97.2 °F (36.2  °C) 58 17 102/56 75 100 % -- -- -- -- --    12/17/24 2000 97 °F (36.1 °C) 52 14 102/63 78 99 % 40 Ventilator Lying 11 --    12/17/24 1910 -- 52 14 -- -- 99 % 40 Ventilator -- -- --    12/17/24 1800 97.5 °F (36.4 °C) 59 14 97/59 72 97 % -- -- -- -- --    12/17/24 1700 98.1 °F (36.7 °C) 81 16 97/60 72 96 % -- -- -- -- --    12/17/24 1600 97.9 °F (36.6 °C) 53 19 154/80 107 97 % -- Ventilator Lying 11 --    12/17/24 1538 -- -- -- -- -- 96 % -- -- -- -- --    12/17/24 1517 -- -- -- -- -- 99 % -- -- -- -- --    12/17/24 1500 98.2 °F (36.8 °C) 56 28 124/67 91 100 % -- -- -- -- --    12/17/24 1400 98.4 °F (36.9 °C) 53 21 114/65 85 100 % -- -- -- -- --    12/17/24 1300 98.2 °F (36.8 °C) 48 40 111/68 84 100 % -- -- -- -- --    12/17/24 1200 98.2 °F (36.8 °C) 60 23 104/57 76 99 % -- Ventilator Lying 11 --    12/17/24 1125 -- -- -- -- -- 99 % -- -- -- -- --    12/17/24 1100 98.2 °F (36.8 °C) 72 21 89/50 64 99 % -- -- -- -- --    12/17/24 1000 97.9 °F (36.6 °C) 66 28 94/51 66 99 % -- -- -- -- --    12/17/24 0900 97.9 °F (36.6 °C) 60 20 113/60 81 99 % -- -- -- -- --    12/17/24 0800 97.9 °F (36.6 °C) 59 17 119/67 89 99 % -- Ventilator Lying 11 --    12/17/24 0752 -- 51 14 -- -- 100 % -- -- -- -- --    12/17/24 0717 -- -- -- -- -- 99 % -- -- -- -- --    12/17/24 0700 97.9 °F (36.6 °C) 63 17 93/58 70 99 % -- -- -- -- --    12/17/24 0600 98.1 °F (36.7 °C) 57 27 104/63 79 99 % -- -- -- -- --    12/17/24 0500 98.4 °F (36.9 °C) 97 28 102/66 80 99 % -- -- -- -- --    12/17/24 0400 97.9 °F (36.6 °C) 52 29 99/67 80 100 % -- -- -- 12 --    12/17/24 0338 -- -- -- -- -- 99 % 40 Ventilator -- -- --    12/17/24 0300 97.9 °F (36.6 °C) 60 24 96/73 82 99 % -- -- -- -- --    12/17/24 0200 97.9 °F (36.6 °C) 59 42 110/60 80 100 % -- -- -- -- --    12/17/24 0100 98.1 °F (36.7 °C) 58 41 111/58 80 100 % -- -- -- -- --    12/17/24 0000 98.2 °F (36.8 °C) 65 40 97/56 71 98 % -- Ventilator -- 12 --    12/16/24 2330 -- 70 15 -- -- 99 % -- -- -- -- --     12/16/24 2314 -- 62 14 -- -- 99 % 40 Ventilator -- -- --    12/16/24 2300 99 °F (37.2 °C) 72 14 89/55 67 99 % -- -- -- -- --    12/16/24 2200 98.8 °F (37.1 °C) 63 32 116/69 -- 99 % -- -- -- -- --    12/16/24 2100 98.6 °F (37 °C) 69 40 114/65 85 99 % -- -- -- -- --    12/16/24 2000 98.8 °F (37.1 °C) 67 37 138/64 92 100 % -- -- -- 12 --    12/16/24 1930 98.6 °F (37 °C) 74 39 115/75 90 99 % -- -- -- -- --    12/16/24 1916 -- 85 15 -- -- 98 % 40 Ventilator -- -- --    12/16/24 1900 97.9 °F (36.6 °C) 69 51 118/65 84 99 % -- -- -- -- --    12/16/24 1800 97.7 °F (36.5 °C) 69 45 116/66 86 98 % -- -- -- -- --    12/16/24 1700 97.9 °F (36.6 °C) 63 50 118/65 87 98 % -- -- -- -- --    12/16/24 1600 97.9 °F (36.6 °C) 89 23 129/83 101 97 % -- T-Piece Lying 10 --    12/16/24 1541 97.3 °F (36.3 °C) 94 20 125/73 93 98 % -- -- -- -- --    12/16/24 1400 97.2 °F (36.2 °C) 80 27 114/60 77 96 % -- -- -- -- --    12/16/24 1300 96.6 °F (35.9 °C) 89 22 92/52 66 95 % -- -- -- -- --    12/16/24 1200 96.8 °F (36 °C) 79 45 89/53 65 96 % -- -- -- 10 --    12/16/24 1116 -- -- -- -- -- 97 % -- -- -- -- --    12/16/24 1000 97.3 °F (36.3 °C) 63 45 90/54 67 96 % -- -- -- -- --    12/16/24 0902 -- 58 15 -- -- 98 % -- -- -- -- --    12/16/24 0900 97.7 °F (36.5 °C) 61 20 126/70 92 97 % -- -- -- -- --    12/16/24 0800 -- -- -- -- -- -- -- -- -- 10 --    12/16/24 0745 98 °F (36.7 °C) 56 71 106/57 75 93 % -- -- -- -- --    12/16/24 0726 -- 57 15 -- -- 97 % -- -- -- -- --          Weight (last 2 days)       Date/Time Weight    12/19/24 0530 98.5 (217.15)    12/18/24 0542 96.6 (212.96)    12/17/24 0600 94.1 (207.45)            Pertinent Labs/Diagnostic Results:   Radiology:  XR chest portable ICU   Final Interpretation by Kyle Vega MD (12/18 1780)      Platelike atelectasis left midlung field. Stable line and tube positioning. Low lung volumes.            Workstation performed: [id number]         XR chest portable    (Results Pending)      Cardiology:  ECG 12 lead   Final Result by Avelino Chase MD (12/17 0959)   Sinus bradycardia   Rightward axis   T wave abnormality, consider inferior ischemia   Abnormal ECG      Confirmed by Avelino Chase ([id number]) on 12/17/2024 9:59:06 AM        GI:  No orders to display           Results from last 7 days   Lab Units 12/19/24  0546 12/18/24  0201 12/17/24  0441 12/16/24  0822 12/15/24  2343   WBC Thousand/uL 8.54 7.80 11.52* 13.23* 14.84*   HEMOGLOBIN g/dL 11.8* 10.5* 11.0* 11.3* 14.0   HEMATOCRIT % 39.2 34.7* 36.0* 37.3 47.6   PLATELETS Thousands/uL 200 186 206 236 281   TOTAL NEUT ABS Thousands/µL 6.40 5.73 9.45* 10.73* 11.84*         Results from last 7 days   Lab Units 12/19/24  0545 12/18/24  0201 12/17/24  0441 12/17/24  0009 12/16/24  1700 12/16/24  0822 12/15/24  2343   SODIUM mmol/L 144 150* 153* 151* 155* 154* 162*   POTASSIUM mmol/L 4.1 3.4* 3.6 3.6 2.8* 3.9 4.3   CHLORIDE mmol/L 111* 117* 116* 117* 117* 117* 116*   CO2 mmol/L 28 26 30 30 29 29 36*   ANION GAP mmol/L 5 7 7 4 9 8 10   BUN mg/dL 13 14 17 18 21 23 27*   CREATININE mg/dL 0.51* 0.57* 0.66 0.67 0.72 0.65 0.87   EGFR ml/min/1.73sq m 137 131 123 122 119 124 110   CALCIUM mg/dL 8.8 8.7 8.9 8.7 8.8 9.2 10.5*   CALCIUM, IONIZED mmol/L  --   --   --   --   --  1.14  --    MAGNESIUM mg/dL 2.2 2.3 2.4  --   --  2.6 2.7   PHOSPHORUS mg/dL 4.0 3.3 2.2*  --   --  3.2  --      Results from last 7 days   Lab Units 12/16/24  0822 12/15/24  2343   AST U/L 18 17   ALT U/L 24 33   ALK PHOS U/L 95 115*   TOTAL PROTEIN g/dL 7.3 9.0*   ALBUMIN g/dL 3.7 4.3   TOTAL BILIRUBIN mg/dL 0.48 0.38     Results from last 7 days   Lab Units 12/16/24  1109   POC GLUCOSE mg/dl 90     Results from last 7 days   Lab Units 12/19/24  0545 12/18/24  0201 12/17/24  0441 12/17/24  0009 12/16/24  1700 12/16/24  0822 12/15/24  2343   GLUCOSE RANDOM mg/dL 94 96 88 102 108 75 83     Results from last 7 days   Lab Units 12/16/24  0038   OSMOLALITY, SERUM mmol/*          Results from last 7 days   Lab Units 12/15/24  2350   PH SYLVESTER  7.318   PCO2 SYLVESTER mm Hg 53.5*   PO2 SYLVESTER mm Hg 58.8*   HCO3 SYLVESTER mmol/L 26.8   BASE EXC SYLVESTER mmol/L -0.1   O2 CONTENT SYLVESTER ml/dL 16.4   O2 HGB, VENOUS % 88.8*     Results from last 7 days   Lab Units 12/16/24  0157 12/15/24  2343   HS TNI 0HR ng/L  --  3   HS TNI 2HR ng/L <2  --    HSTNI D2 ng/L <-1  --        Results from last 7 days   Lab Units 12/15/24  2343   PROTIME seconds 15.5*   INR  1.19   PTT seconds 28     Results from last 7 days   Lab Units 12/16/24  0157   TSH 3RD GENERATON uIU/mL 2.090     Results from last 7 days   Lab Units 12/19/24  0546 12/18/24  0201 12/17/24  0441 12/16/24  0822 12/15/24  2343   PROCALCITONIN ng/ml 0.31* 0.47* 0.32* 0.11 0.07     Results from last 7 days   Lab Units 12/16/24  0822 12/16/24  0157 12/15/24  2343   LACTIC ACID mmol/L 1.2 1.0 3.1*       Results from last 7 days   Lab Units 12/15/24  2343   LIPASE u/L 30       Results from last 7 days   Lab Units 12/16/24  0038   OSMOLALITY, SERUM mmol/*   OSMO UR mmol/     Results from last 7 days   Lab Units 12/16/24  0903 12/16/24  0038 12/16/24  0017   CLARITY UA  Clear  --  Clear   COLOR UA  Yellow  --  Yellow   SPEC GRAV UA  1.025  --  >=1.030   PH UA  5.5  --  6.0   GLUCOSE UA mg/dl Negative  --  Negative   KETONES UA mg/dl Negative  --  Negative   BLOOD UA  Small*  --  3+*   PROTEIN UA mg/dl 30 (1+)*  --  Trace*   NITRITE UA  Negative  --  Negative   BILIRUBIN UA  Negative  --  Negative   UROBILINOGEN UA E.U./dl  --   --  0.2   UROBILINOGEN UA (BE) mg/dl <2.0  --   --    LEUKOCYTES UA  Negative  --  Negative   WBC UA /hpf 1-2  --  4-10*   RBC UA /hpf 2-4  --  20-30*   BACTERIA UA /hpf Occasional  --  Occasional   EPITHELIAL CELLS WET PREP /hpf None Seen  --  Occasional   MUCUS THREADS  Occasional*  --  Occasional*   SODIUM UR   --  114  --    CREATININE UR mg/dL  --  138.0  --      Results from last 7 days   Lab Units 12/16/24  0904   STREP PNEUMONIAE  ANTIGEN, URINE  Negative   LEGIONELLA URINARY ANTIGEN  Negative         Results from last 7 days   Lab Units 12/16/24  0038   AMPH/METH  Negative   BARBITURATE UR  Negative   BENZODIAZEPINE UR  Positive*   COCAINE UR  Negative   METHADONE URINE  Negative   OPIATE UR  Negative   PCP UR  Negative   THC UR  Positive*     Results from last 7 days   Lab Units 12/16/24  0822   ETHANOL LVL mg/dL <10       Results from last 7 days   Lab Units 12/16/24  1433 12/16/24  0903 12/16/24  0821 12/15/24  2353 12/15/24  2343   BLOOD CULTURE   --  No Growth at 48 hrs. Staphylococcus epidermidis* No Growth at 48 hrs. No Growth at 48 hrs.   SPUTUM CULTURE  2+ Growth of Pseudomonas aeruginosa*  2+ Growth of Serratia marcescens*  --   --   --   --    GRAM STAIN RESULT  3+ Polys*  2+ Gram positive cocci in pairs*  --  Gram positive cocci in clusters*  --   --          Results from last 7 days   Lab Units 12/18/24  0201   VANCOMYCIN RM ug/mL 18.2       Network Utilization Review Department  ATTENTION: Please call with any questions or concerns to [phone] and carefully listen to the prompts so that you are directed to the right person. All voicemails are confidential.   For Discharge needs, contact Care Management DC Support Team at [phone] opt. [phone]  Send all requests for admission clinical reviews, approved or denied determinations and any other requests to dedicated fax number below belonging to the campus where the patient is receiving treatment. List of dedicated fax numbers for the Facilities:  FACILITY NAME UR FAX NUMBER   ADMISSION DENIALS (Administrative/Medical Necessity) [phone]   DISCHARGE SUPPORT TEAM (NETWORK) [phone]   PARENT CHILD HEALTH (Maternity/NICU/Pediatrics) [phone]   [facility] [phone]   [facility] [phone]   [facility] [phone]   [facility] [phone]   [facility]  [facility] [phone]   [facility] [phone]   [facility] [phone]   [facility] [phone]   [facility] [phone]   [facility] [phone]   [facility] [phone]   [facility] [phone]

## 2024-12-19 NOTE — PLAN OF CARE
Problem: RESPIRATORY - ADULT  Goal: Achieves optimal ventilation and oxygenation  Description: INTERVENTIONS:  - Assess for changes in respiratory status  - Assess for changes in mentation and behavior  - Position to facilitate oxygenation and minimize respiratory effort  - Oxygen administered by appropriate delivery if ordered  - Initiate smoking cessation education as indicated  - Encourage broncho-pulmonary hygiene including cough, deep breathe, Incentive Spirometry  - Assess the need for suctioning and aspirate as needed  - Assess and instruct to report SOB or any respiratory difficulty  - Respiratory Therapy support as indicated  Outcome: Progressing     Problem: Prexisting or High Potential for Compromised Skin Integrity  Goal: Skin integrity is maintained or improved  Description: INTERVENTIONS:  - Identify patients at risk for skin breakdown  - Assess and monitor skin integrity  - Assess and monitor nutrition and hydration status  - Monitor labs   - Assess for incontinence   - Turn and reposition patient  - Assist with mobility/ambulation  - Relieve pressure over bony prominences  - Avoid friction and shearing  - Provide appropriate hygiene as needed including keeping skin clean and dry  - Evaluate need for skin moisturizer/barrier cream  - Collaborate with interdisciplinary team   - Patient/family teaching  - Consider wound care consult   Outcome: Progressing     Problem: PAIN - ADULT  Goal: Verbalizes/displays adequate comfort level or baseline comfort level  Description: Interventions:  - Encourage patient to monitor pain and request assistance  - Assess pain using appropriate pain scale  - Administer analgesics based on type and severity of pain and evaluate response  - Implement non-pharmacological measures as appropriate and evaluate response  - Consider cultural and social influences on pain and pain management  - Notify physician/advanced practitioner if interventions unsuccessful or patient  reports new pain  Outcome: Progressing     Problem: INFECTION - ADULT  Goal: Absence or prevention of progression during hospitalization  Description: INTERVENTIONS:  - Assess and monitor for signs and symptoms of infection  - Monitor lab/diagnostic results  - Monitor all insertion sites, i.e. indwelling lines, tubes, and drains  - Monitor endotracheal if appropriate and nasal secretions for changes in amount and color  - [place] appropriate cooling/warming therapies per order  - Administer medications as ordered  - Instruct and encourage patient and family to use good hand hygiene technique  - Identify and instruct in appropriate isolation precautions for identified infection/condition  Outcome: Progressing  Goal: Absence of fever/infection during neutropenic period  Description: INTERVENTIONS:  - Monitor WBC    Outcome: Progressing     Problem: SAFETY ADULT  Goal: Patient will remain free of falls  Description: INTERVENTIONS:  - Educate patient/family on patient safety including physical limitations  - Instruct patient to call for assistance with activity   - Consult OT/PT to assist with strengthening/mobility   - Keep Call bell within reach  - Keep bed low and locked with side rails adjusted as appropriate  - Keep care items and personal belongings within reach  - Initiate and maintain comfort rounds  - Make Fall Risk Sign visible to staff  - Offer Toileting every 2 Hours, in advance of need  - Initiate/Maintain bed alarm  - Obtain necessary fall risk management equipment: yellow socks  - Apply yellow socks and bracelet for high fall risk patients  - Consider moving patient to room near nurses station  Outcome: Progressing  Goal: Maintain or return to baseline ADL function  Description: INTERVENTIONS:  -  Assess patient's ability to carry out ADLs; assess patient's baseline for ADL function and identify physical deficits which impact ability to perform ADLs (bathing, care of mouth/teeth, toileting, grooming,  dressing, etc.)  - Assess/evaluate cause of self-care deficits   - Assess range of motion  - Assess patient's mobility; develop plan if impaired  - Assess patient's need for assistive devices and provide as appropriate  - Encourage maximum independence but intervene and supervise when necessary  - Involve family in performance of ADLs  - Assess for home care needs following discharge   - Consider OT consult to assist with ADL evaluation and planning for discharge  - Provide patient education as appropriate  Outcome: Progressing  Goal: Maintains/Returns to pre admission functional level  Description: INTERVENTIONS:  - Perform AM-PAC 6 Click Basic Mobility/ Daily Activity assessment daily.  - Set and communicate daily mobility goal to care team and patient/family/caregiver.   - Collaborate with rehabilitation services on mobility goals if consulted  - Perform Range of Motion 4 times a day.  - Reposition patient every 2 hours.  - Dangle patient 3 times a day  - Stand patient 3 times a day  - Ambulate patient 3 times a day  - Out of bed to chair 3 times a day   - Out of bed for meals 3 times a day  - Out of bed for toileting  - Record patient progress and toleration of activity level   Outcome: Progressing     Problem: DISCHARGE PLANNING  Goal: Discharge to home or other facility with appropriate resources  Description: INTERVENTIONS:  - Identify barriers to discharge w/patient and caregiver  - Arrange for needed discharge resources and transportation as appropriate  - Identify discharge learning needs (meds, wound care, etc.)  - Arrange for interpretive services to assist at discharge as needed  - Refer to Case Management Department for coordinating discharge planning if the patient needs post-hospital services based on physician/advanced practitioner order or complex needs related to functional status, cognitive ability, or social support system  Outcome: Progressing     Problem: Knowledge Deficit  Goal:  Patient/family/caregiver demonstrates understanding of disease process, treatment plan, medications, and discharge instructions  Description: Complete learning assessment and assess knowledge base.  Interventions:  - Provide teaching at level of understanding  - Provide teaching via preferred learning methods  Outcome: Progressing     Problem: SAFETY,RESTRAINT: NV/NON-SELF DESTRUCTIVE BEHAVIOR  Goal: Remains free of harm/injury (restraint for non violent/non self-detsructive behavior)  Description: INTERVENTIONS:  - Instruct patient/family regarding restraint use   - Assess and monitor physiologic and psychological status   - Provide interventions and comfort measures to meet assessed patient needs   - Identify and implement measures to help patient regain control  - Assess readiness for release of restraint   Outcome: Progressing  Goal: Returns to optimal restraint-free functioning  Description: INTERVENTIONS:  - Assess the patient's behavior and symptoms that indicate continued need for restraint  - Identify and implement measures to help patient regain control  - Assess readiness for release of restraint   Outcome: Progressing     Problem: Nutrition/Hydration-ADULT  Goal: Nutrient/Hydration intake appropriate for improving, restoring or maintaining nutritional needs  Description: Monitor and assess patient's nutrition/hydration status for malnutrition. Collaborate with interdisciplinary team and initiate plan and interventions as ordered.  Monitor patient's weight and dietary intake as ordered or per policy. Utilize nutrition screening tool and intervene as necessary. Determine patient's food preferences and provide high-protein, high-caloric foods as appropriate.     INTERVENTIONS:  - Monitor oral intake, urinary output, labs, and treatment plans  - Assess nutrition and hydration status and recommend course of action  - Evaluate amount of meals eaten  - Assist patient with eating if necessary   - Allow adequate  time for meals  - Recommend/ encourage appropriate diets, oral nutritional supplements, and vitamin/mineral supplements  - Order, calculate, and assess calorie counts as needed  - Recommend, monitor, and adjust tube feedings and TPN/PPN based on assessed needs  - Assess need for intravenous fluids  - Provide specific nutrition/hydration education as appropriate  - Include patient/family/caregiver in decisions related to nutrition  Outcome: Progressing

## 2024-12-19 NOTE — ASSESSMENT & PLAN NOTE
S/p trach for NM disease   Trach 6XL  History of significant mucous plugs  CT chest revealing complete collapse of the left lower lobe and severe atelectasis of the right lower lobe similar to prior examination. Superimposed aspiration or pneumonia cannot be excluded. Trace left pleural effusion.     Plan:  Continue ACVC 14/500/50/6+   Had episodes of hypoxia/mucous plugging yesterday  Aggressive pulmonary hygiene   Mucomyst, albuterol  Chest therapy  Frequent suctioning/turning  Failed trach collar x2 on 12/17  Antibiotics as above

## 2024-12-19 NOTE — ASSESSMENT & PLAN NOTE
Presents with sodium of 162  Per chart review appears patient was on p.o. regimen  Repeat sodium down to 150 yesterday, AM labs pending   Continue [facility]WF via Tfs  Trend BMP

## 2024-12-20 ENCOUNTER — APPOINTMENT (INPATIENT)
Dept: RADIOLOGY | Facility: HOSPITAL | Age: 39
DRG: 137 | End: 2024-12-20
Payer: COMMERCIAL

## 2024-12-20 LAB
ANION GAP SERPL CALCULATED.3IONS-SCNC: 7 MMOL/L (ref 4–13)
BUN SERPL-MCNC: 14 MG/DL (ref 5–25)
CALCIUM SERPL-MCNC: 8.7 MG/DL (ref 8.4–10.2)
CHLORIDE SERPL-SCNC: 111 MMOL/L (ref 96–108)
CO2 SERPL-SCNC: 26 MMOL/L (ref 21–32)
CREAT SERPL-MCNC: 0.43 MG/DL (ref 0.6–1.3)
GFR SERPL CREATININE-BSD FRML MDRD: 147 ML/MIN/1.73SQ M
GLUCOSE SERPL-MCNC: 86 MG/DL (ref 65–140)
POTASSIUM SERPL-SCNC: 4.3 MMOL/L (ref 3.5–5.3)
SODIUM SERPL-SCNC: 144 MMOL/L (ref 135–147)

## 2024-12-20 PROCEDURE — 99232 SBSQ HOSP IP/OBS MODERATE 35: CPT | Performed by: STUDENT IN AN ORGANIZED HEALTH CARE EDUCATION/TRAINING PROGRAM

## 2024-12-20 PROCEDURE — 94150 VITAL CAPACITY TEST: CPT

## 2024-12-20 PROCEDURE — 71045 X-RAY EXAM CHEST 1 VIEW: CPT

## 2024-12-20 PROCEDURE — 94640 AIRWAY INHALATION TREATMENT: CPT

## 2024-12-20 PROCEDURE — 80048 BASIC METABOLIC PNL TOTAL CA: CPT | Performed by: NURSE PRACTITIONER

## 2024-12-20 PROCEDURE — 94003 VENT MGMT INPAT SUBQ DAY: CPT

## 2024-12-20 PROCEDURE — 94669 MECHANICAL CHEST WALL OSCILL: CPT

## 2024-12-20 PROCEDURE — NC001 PR NO CHARGE: Performed by: STUDENT IN AN ORGANIZED HEALTH CARE EDUCATION/TRAINING PROGRAM

## 2024-12-20 PROCEDURE — 94760 N-INVAS EAR/PLS OXIMETRY 1: CPT

## 2024-12-20 PROCEDURE — 94668 MNPJ CHEST WALL SBSQ: CPT

## 2024-12-20 RX ORDER — SODIUM CHLORIDE, SODIUM GLUCONATE, SODIUM ACETATE, POTASSIUM CHLORIDE, MAGNESIUM CHLORIDE, SODIUM PHOSPHATE, DIBASIC, AND POTASSIUM PHOSPHATE .53; .5; .37; .037; .03; .012; .00082 G/100ML; G/100ML; G/100ML; G/100ML; G/100ML; G/100ML; G/100ML
500 INJECTION, SOLUTION INTRAVENOUS ONCE
Status: COMPLETED | OUTPATIENT
Start: 2024-12-20 | End: 2024-12-20

## 2024-12-20 RX ORDER — LORAZEPAM 2 MG/ML
2 INJECTION INTRAMUSCULAR ONCE
Status: COMPLETED | OUTPATIENT
Start: 2024-12-20 | End: 2024-12-20

## 2024-12-20 RX ORDER — OLANZAPINE 10 MG/2ML
5 INJECTION, POWDER, FOR SOLUTION INTRAMUSCULAR ONCE
Status: COMPLETED | OUTPATIENT
Start: 2024-12-20 | End: 2024-12-20

## 2024-12-20 RX ADMIN — CEFEPIME HYDROCHLORIDE 2000 MG: 2 INJECTION, SOLUTION INTRAVENOUS at 02:12

## 2024-12-20 RX ADMIN — IPRATROPIUM BROMIDE AND ALBUTEROL SULFATE 3 ML: .5; 3 SOLUTION RESPIRATORY (INHALATION) at 07:28

## 2024-12-20 RX ADMIN — CHLORHEXIDINE GLUCONATE 15 ML: 1.2 RINSE ORAL at 20:33

## 2024-12-20 RX ADMIN — ACETYLCYSTEINE 600 MG: 200 SOLUTION ORAL; RESPIRATORY (INHALATION) at 13:09

## 2024-12-20 RX ADMIN — OLANZAPINE 5 MG: 10 INJECTION, POWDER, FOR SOLUTION INTRAMUSCULAR at 10:41

## 2024-12-20 RX ADMIN — IPRATROPIUM BROMIDE AND ALBUTEROL SULFATE 3 ML: .5; 3 SOLUTION RESPIRATORY (INHALATION) at 19:28

## 2024-12-20 RX ADMIN — ACETYLCYSTEINE 600 MG: 200 SOLUTION ORAL; RESPIRATORY (INHALATION) at 19:28

## 2024-12-20 RX ADMIN — SODIUM CHLORIDE, SODIUM GLUCONATE, SODIUM ACETATE, POTASSIUM CHLORIDE, MAGNESIUM CHLORIDE, SODIUM PHOSPHATE, DIBASIC, AND POTASSIUM PHOSPHATE 500 ML: .53; .5; .37; .037; .03; .012; .00082 INJECTION, SOLUTION INTRAVENOUS at 13:29

## 2024-12-20 RX ADMIN — IPRATROPIUM BROMIDE AND ALBUTEROL SULFATE 3 ML: .5; 3 SOLUTION RESPIRATORY (INHALATION) at 02:31

## 2024-12-20 RX ADMIN — LORAZEPAM 2 MG: 2 INJECTION INTRAMUSCULAR; INTRAVENOUS at 11:19

## 2024-12-20 RX ADMIN — CHLORHEXIDINE GLUCONATE 15 ML: 1.2 RINSE ORAL at 08:09

## 2024-12-20 RX ADMIN — CEFEPIME HYDROCHLORIDE 2000 MG: 2 INJECTION, SOLUTION INTRAVENOUS at 13:18

## 2024-12-20 RX ADMIN — ACETYLCYSTEINE 600 MG: 200 SOLUTION ORAL; RESPIRATORY (INHALATION) at 07:28

## 2024-12-20 RX ADMIN — APIXABAN 5 MG: 5 TABLET, FILM COATED ORAL at 17:13

## 2024-12-20 RX ADMIN — SODIUM CHLORIDE, SODIUM GLUCONATE, SODIUM ACETATE, POTASSIUM CHLORIDE, MAGNESIUM CHLORIDE, SODIUM PHOSPHATE, DIBASIC, AND POTASSIUM PHOSPHATE 500 ML: .53; .5; .37; .037; .03; .012; .00082 INJECTION, SOLUTION INTRAVENOUS at 14:50

## 2024-12-20 RX ADMIN — IPRATROPIUM BROMIDE AND ALBUTEROL SULFATE 3 ML: .5; 3 SOLUTION RESPIRATORY (INHALATION) at 13:09

## 2024-12-20 RX ADMIN — ACETYLCYSTEINE 600 MG: 200 SOLUTION ORAL; RESPIRATORY (INHALATION) at 02:31

## 2024-12-20 RX ADMIN — APIXABAN 5 MG: 5 TABLET, FILM COATED ORAL at 08:09

## 2024-12-20 NOTE — CASE MANAGEMENT
Case Management Discharge Planning Note    Patient name Hemanth Swanson  Location ICU 05/ICU 05 MRN [id number]  : 1985 Date 2024       Current Admission Date: 2024  Current Admission Diagnosis:Pneumonia due to Pseudomonas and Serratia   Patient Active Problem List    Diagnosis Date Noted Date Diagnosed    History of pulmonary embolus (PE) 2024     Hematuria 2024     Rash of back 10/25/2024     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HCC) 10/17/2024     Palliative care by specialist 10/11/2024     Goals of care, counseling/discussion 10/11/2024     Toxic metabolic encephalopathy 10/07/2024     Hypernatremia 10/06/2024     Cardiac arrest due to other underlying condition ([facility]) 2024     Seizure-like activity ([facility]) 2024     Bradycardia 2024     Ventilator dependent ([facility]) 2024     Obesity hypoventilation syndrome ([facility]) 10/24/2023     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     Psychophysiological insomnia 10/18/2023     Impaired mobility 2023     Pneumonia due to Pseudomonas and Serratia 2023     Status post tracheostomy ([facility]) 2023     S/P percutaneous endoscopic gastrostomy (PEG) tube placement ([facility]) 2023     Anxiety 2023     Acute on chronic respiratory failure with hypoxia and hypercapnia ([facility]) 2023     Sepsis ([facility]) 2023     Acute pulmonary embolism without acute cor pulmonale ([facility]) 2023     Depression 2023     Hypokalemia 2023     History of LVH (left ventricular hypertrophy) 2023     Pure hypertriglyceridemia 2023     Unilateral vestibular schwannoma ([facility]) 2023     Port-A-Cath in place 2023     Diplopia 2023     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[facility]) 2023     Umbilical hernia without obstruction and without gangrene 12/10/2022     Tobacco use 12/10/2022     Retroperitoneal lymphadenopathy 12/10/2022       LOS (days): 4  Geometric Mean LOS (GMLOS) (days):   Days to GMLOS:      OBJECTIVE:  Risk of Unplanned Readmission Score: 34.7     Current admission status: Inpatient   Preferred Pharmacy:   [id number] - [place], [place] - [address]  [address] [address]  Phone: [phone] Fax: [phone]    Primary Care Provider: Ela Douglas MD    Primary Insurance: [facility]  Secondary Insurance:     DISCHARGE DETAILS:    Other Referral/Resources/Interventions Provided:  Interventions: C  Referral Comments: [facility] accepted pt for home care services.  Agency will be reserved in [place] as requested by sister.  Will provide clinical updates to agency as needed in order to prepare for services upon discharge.    Treatment Team Recommendation: Home with Home Health Care  Discharge Destination Plan:: Home with Home Health Care  Transport at Discharge : [facility], [facility]

## 2024-12-20 NOTE — UTILIZATION REVIEW
Continued Stay Review    Date: 12/20                          Current Patient Class: Inpatient  Current Level of Care: Critical Care     HPI:39 y.o. male initially admitted on 12/16      Assessment/Plan:   Sepsis, Pneumonia, toxic metabolic encephalopathy, acute on chronic resp failure on home vent,  hypernatremia resolved - remains on home vent settings, imaging from yesterday shows no interval change.  Had hypoxic episode this AM that was self-limiting with improvement in hypoxia. Remains on IV antibiotics.  VS stable today.  GCS 11.  No leukocytosis.  Lytes WNL.  Tolerating vent.        Medications:   Scheduled Medications:  acetylcysteine, 3 mL, Nebulization, Q6H  apixaban, 5 mg, Per PEG Tube, BID  cefepime, 2,000 mg, Intravenous, Q12H  chlorhexidine, 15 mL, Mouth/Throat, Q12H ISAEL  ipratropium-albuterol, 3 mL, Nebulization, Q6H      Continuous IV Infusions:     PRN Meds:  acetaminophen, 1,000 mg, Intravenous, Q8H PRN      Discharge Plan: TBD    Vital Signs (last 3 days)       Date/Time Temp Pulse Resp BP MAP (mmHg) SpO2 FiO2 (%) O2 Device Patient Position - Orthostatic VS Purvi Coma Scale Score Pain    12/20/24 0900 -- -- -- 121/65 89 -- -- -- -- -- --    12/20/24 0800 -- 82 22 115/87 92 96 % 35 Ventilator Lying 11 No Pain    12/20/24 0748 -- 82 16 -- -- 95 % -- -- -- -- --    12/20/24 0728 -- -- -- -- -- 95 % -- -- -- -- --    12/20/24 0719 97.6 °F (36.4 °C) -- -- -- -- -- -- -- -- -- --    12/20/24 0700 -- 78 15 134/77 99 95 % 40 -- -- -- --    12/20/24 0600 -- 83 15 129/90 104 94 % -- -- -- -- --    12/20/24 0500 -- 75 19 130/83 101 95 % -- -- -- -- --    12/20/24 0400 97.5 °F (36.4 °C) 76 19 93/56 69 95 % 40 Ventilator Lying 11 No Pain    12/20/24 0314 -- -- -- -- -- 93 % -- -- -- -- --    12/20/24 0300 -- 83 21 98/64 74 93 % -- -- -- -- --    12/20/24 0234 -- -- -- -- -- 100 % -- -- -- -- --    12/20/24 0200 -- 61 20 91/57 69 100 % -- -- -- -- --    12/20/24 0100 -- 62 14 91/57 68 100 % -- -- -- -- --     12/20/24 0007 -- 59 13 101/63 76 100 % -- -- -- -- --    12/20/24 0000 96.9 °F (36.1 °C) 61 14 87/54 65 100 % 40 Ventilator Lying 11 No Pain    12/19/24 2358 -- -- -- -- -- 100 % -- -- -- -- --    12/19/24 2300 -- 62 23 94/62 68 100 % -- -- -- -- --    12/19/24 2200 -- 60 14 93/60 72 100 % -- -- -- -- --    12/19/24 2100 -- 66 14 94/55 69 100 % -- -- -- -- --    12/19/24 2000 97.2 °F (36.2 °C) 59 17 94/60 67 100 % 40 Ventilator Lying 11 No Pain    12/19/24 1946 -- -- -- -- -- 100 % 40 Ventilator -- -- --    12/19/24 1900 -- 62 14 99/59 73 100 % -- -- -- -- --    12/19/24 1800 -- 78 14 106/69 82 99 % -- -- -- -- --    12/19/24 1700 96.6 °F (35.9 °C) 53 19 153/98 121 100 % -- -- -- -- --    12/19/24 1600 97 °F (36.1 °C) 86 14 101/73 83 99 % -- -- -- 11 --    12/19/24 1548 -- -- -- -- -- 97 % 40 Ventilator -- -- --    12/19/24 1500 96.8 °F (36 °C) 81 15 103/84 91 99 % -- -- -- -- --    12/19/24 1400 97 °F (36.1 °C) 78 27 127/70 89 -- -- -- -- -- --    12/19/24 1351 -- -- -- -- -- 95 % 40 Ventilator -- -- --    12/19/24 1200 96.8 °F (36 °C) 79 24 161/71 102 85 % -- -- -- 11 --    12/19/24 1131 -- -- -- -- -- 97 % -- -- -- -- --    12/19/24 1100 96.3 °F (35.7 °C) 65 15 108/71 86 97 % -- -- -- -- --    12/19/24 1000 95.7 °F (35.4 °C) 71 15 103/62 76 96 % -- -- -- -- --    12/19/24 0900 95.9 °F (35.5 °C) 74 31 115/73 91 74 % -- -- -- -- --    12/19/24 0800 96.1 °F (35.6 °C) 57 18 110/78 88 100 % -- -- -- 11 No Pain    12/19/24 0748 -- -- -- -- -- 100 % 30 Ventilator -- -- --    12/19/24 0700 96.3 °F (35.7 °C) 54 15 107/58 78 100 % -- -- -- -- --    12/19/24 0600 96.4 °F (35.8 °C) 67 24 91/54 67 97 % -- -- -- -- --    12/19/24 0500 96.4 °F (35.8 °C) 52 18 145/84 110 100 % -- -- -- -- --    12/19/24 0400 96.4 °F (35.8 °C) 57 14 98/67 76 100 % 50 Ventilator Lying 11 No Pain    12/19/24 0300 96.4 °F (35.8 °C) 59 14 106/68 82 98 % -- -- -- -- --    12/19/24 0200 96.6 °F (35.9 °C) 61 14 117/71 88 100 % -- -- -- -- --    12/19/24  0100 97 °F (36.1 °C) 82 21 138/94 112 100 % -- -- -- -- --    12/19/24 0000 97.2 °F (36.2 °C) 71 18 97/56 71 99 % -- -- -- 11 No Pain    12/18/24 2300 97 °F (36.1 °C) 80 16 95/54 71 100 % -- -- -- -- --    12/18/24 2200 97.7 °F (36.5 °C) 77 22 118/56 80 100 % -- -- -- -- --    12/18/24 2100 97.9 °F (36.6 °C) 59 14 117/68 86 100 % -- -- -- -- --    12/18/24 2000 97.9 °F (36.6 °C) 55 19 125/73 94 100 % 50 Ventilator Lying 11 No Pain    12/18/24 1946 -- 74 14 -- -- 100 % 40 Ventilator -- -- No Pain    12/18/24 1900 98.6 °F (37 °C) 73 14 96/54 69 100 % -- -- -- -- --    12/18/24 1800 98.8 °F (37.1 °C) 92 14 100/56 70 99 % -- -- -- -- --    12/18/24 1700 99.3 °F (37.4 °C) 76 17 137/82 102 99 % -- -- -- -- --    12/18/24 1600 99.3 °F (37.4 °C) 107 15 135/51 74 99 % 40 Ventilator Lying 11 --    12/18/24 1500 99 °F (37.2 °C) 85 22 131/72 96 89 % -- -- -- -- --    12/18/24 1411 98.8 °F (37.1 °C) 95 14 94/50 67 99 % -- -- -- -- --    12/18/24 1323 -- -- -- -- -- 96 % 40 Ventilator -- -- --    12/18/24 1300 98.8 °F (37.1 °C) 74 19 125/85 94 100 % -- -- -- -- --    12/18/24 1200 98.1 °F (36.7 °C) 58 15 130/69 92 100 % 40 Ventilator Lying 11 --    12/18/24 1100 97.9 °F (36.6 °C) 57 14 102/56 75 100 % -- -- -- -- --    12/18/24 1000 97.5 °F (36.4 °C) 54 15 111/73 87 100 % -- -- -- -- --    12/18/24 0900 97.5 °F (36.4 °C) 59 19 105/62 78 100 % -- -- -- -- --    12/18/24 0800 97.5 °F (36.4 °C) 69 21 114/59 77 100 % 40 Ventilator Lying 11 --    12/18/24 0738 -- -- -- -- -- 100 % -- Ventilator -- -- --    12/18/24 0700 97.2 °F (36.2 °C) 53 16 96/52 70 100 % -- -- -- -- --    12/18/24 0600 97.2 °F (36.2 °C) 48 16 132/76 99 100 % -- -- -- -- --    12/18/24 0500 97.3 °F (36.3 °C) 61 14 87/48 63 100 % -- Ventilator -- -- --    12/18/24 0400 97.3 °F (36.3 °C) -- -- -- -- -- -- -- -- 11 --    12/18/24 0345 -- -- -- -- -- 100 % 40 Ventilator -- -- --    12/18/24 0300 97.5 °F (36.4 °C) 56 14 102/61 76 100 % -- -- -- -- --    12/18/24 0200  97.5 °F (36.4 °C) 55 14 121/58 83 100 % -- -- -- -- --    12/18/24 0000 97.9 °F (36.6 °C) 100 15 91/50 65 99 % 40 Ventilator -- 11 --    12/17/24 2324 -- 89 18 -- -- 99 % -- -- -- -- --    12/17/24 2300 97 °F (36.1 °C) 51 18 111/60 80 100 % 40 Ventilator -- -- --    12/17/24 2200 97 °F (36.1 °C) 55 14 93/54 69 100 % -- -- -- -- --    12/17/24 2100 97.2 °F (36.2 °C) 58 17 102/56 75 100 % -- -- -- -- --    12/17/24 2000 97 °F (36.1 °C) 52 14 102/63 78 99 % 40 Ventilator Lying 11 --    12/17/24 1910 -- 52 14 -- -- 99 % 40 Ventilator -- -- --    12/17/24 1800 97.5 °F (36.4 °C) 59 14 97/59 72 97 % -- -- -- -- --    12/17/24 1700 98.1 °F (36.7 °C) 81 16 97/60 72 96 % -- -- -- -- --    12/17/24 1600 97.9 °F (36.6 °C) 53 19 154/80 107 97 % -- Ventilator Lying 11 --    12/17/24 1538 -- -- -- -- -- 96 % -- -- -- -- --    12/17/24 1517 -- -- -- -- -- 99 % -- -- -- -- --    12/17/24 1500 98.2 °F (36.8 °C) 56 28 124/67 91 100 % -- -- -- -- --    12/17/24 1400 98.4 °F (36.9 °C) 53 21 114/65 85 100 % -- -- -- -- --    12/17/24 1300 98.2 °F (36.8 °C) 48 40 111/68 84 100 % -- -- -- -- --    12/17/24 1200 98.2 °F (36.8 °C) 60 23 104/57 76 99 % -- Ventilator Lying 11 --    12/17/24 1125 -- -- -- -- -- 99 % -- -- -- -- --    12/17/24 1100 98.2 °F (36.8 °C) 72 21 89/50 64 99 % -- -- -- -- --    12/17/24 1000 97.9 °F (36.6 °C) 66 28 94/51 66 99 % -- -- -- -- --    12/17/24 0900 97.9 °F (36.6 °C) 60 20 113/60 81 99 % -- -- -- -- --    12/17/24 0800 97.9 °F (36.6 °C) 59 17 119/67 89 99 % -- Ventilator Lying 11 --    12/17/24 0752 -- 51 14 -- -- 100 % -- -- -- -- --    12/17/24 0717 -- -- -- -- -- 99 % -- -- -- -- --    12/17/24 0700 97.9 °F (36.6 °C) 63 17 93/58 70 99 % -- -- -- -- --    12/17/24 0600 98.1 °F (36.7 °C) 57 27 104/63 79 99 % -- -- -- -- --    12/17/24 0500 98.4 °F (36.9 °C) 97 28 102/66 80 99 % -- -- -- -- --    12/17/24 0400 97.9 °F (36.6 °C) 52 29 99/67 80 100 % -- -- -- 12 --    12/17/24 0338 -- -- -- -- -- 99 % 40  Ventilator -- -- --    12/17/24 0300 97.9 °F (36.6 °C) 60 24 96/73 82 99 % -- -- -- -- --    12/17/24 0200 97.9 °F (36.6 °C) 59 42 110/60 80 100 % -- -- -- -- --    12/17/24 0100 98.1 °F (36.7 °C) 58 41 111/58 80 100 % -- -- -- -- --    12/17/24 0000 98.2 °F (36.8 °C) 65 40 97/56 71 98 % -- Ventilator -- 12 --          Weight (last 2 days)       Date/Time Weight    12/20/24 0524 99.2 (218.7)    12/19/24 0530 98.5 (217.15)    12/18/24 0542 96.6 (212.96)            Pertinent Labs/Diagnostic Results:   Radiology:  XR chest portable   Final Interpretation by Bhupinder Sotomayor MD (12/19 1612)      No significant interval change.            Workstation performed: [id number]         XR chest portable ICU   Final Interpretation by Kyle Vega MD (12/18 1317)      Platelike atelectasis left midlung field. Stable line and tube positioning. Low lung volumes.            Workstation performed: [id number]           Cardiology:  ECG 12 lead   Final Result by Avelino Chase MD (12/17 4270)   Sinus bradycardia   Rightward axis   T wave abnormality, consider inferior ischemia   Abnormal ECG      Confirmed by Avelino Chase ([id number]) on 12/17/2024 9:59:06 AM        GI:  No orders to display           Results from last 7 days   Lab Units 12/19/24  0546 12/18/24  0201 12/17/24  0441 12/16/24  0822 12/15/24  2343   WBC Thousand/uL 8.54 7.80 11.52* 13.23* 14.84*   HEMOGLOBIN g/dL 11.8* 10.5* 11.0* 11.3* 14.0   HEMATOCRIT % 39.2 34.7* 36.0* 37.3 47.6   PLATELETS Thousands/uL 200 186 206 236 281   TOTAL NEUT ABS Thousands/µL 6.40 5.73 9.45* 10.73* 11.84*         Results from last 7 days   Lab Units 12/20/24  0522 12/19/24  0545 12/18/24  0201 12/17/24  0441 12/17/24  0009 12/16/24  1700 12/16/24  0822 12/15/24  2343   SODIUM mmol/L 144 144 150* 153* 151*   < > 154* 162*   POTASSIUM mmol/L 4.3 4.1 3.4* 3.6 3.6   < > 3.9 4.3   CHLORIDE mmol/L 111* 111* 117* 116* 117*   < > 117* 116*   CO2 mmol/L 26 28 26 30 30   < > 29 36*    ANION GAP mmol/L 7 5 7 7 4   < > 8 10   BUN mg/dL 14 13 14 17 18   < > 23 27*   CREATININE mg/dL 0.43* 0.51* 0.57* 0.66 0.67   < > 0.65 0.87   EGFR ml/min/1.73sq m 147 137 131 123 122   < > 124 110   CALCIUM mg/dL 8.7 8.8 8.7 8.9 8.7   < > 9.2 10.5*   CALCIUM, IONIZED mmol/L  --   --   --   --   --   --  1.14  --    MAGNESIUM mg/dL  --  2.2 2.3 2.4  --   --  2.6 2.7   PHOSPHORUS mg/dL  --  4.0 3.3 2.2*  --   --  3.2  --     < > = values in this interval not displayed.     Results from last 7 days   Lab Units 12/16/24  0822 12/15/24  2343   AST U/L 18 17   ALT U/L 24 33   ALK PHOS U/L 95 115*   TOTAL PROTEIN g/dL 7.3 9.0*   ALBUMIN g/dL 3.7 4.3   TOTAL BILIRUBIN mg/dL 0.48 0.38     Results from last 7 days   Lab Units 12/16/24  1109   POC GLUCOSE mg/dl 90     Results from last 7 days   Lab Units 12/20/24  0522 12/19/24  0545 12/18/24  0201 12/17/24  0441 12/17/24  0009 12/16/24  1700 12/16/24  0822 12/15/24  2343   GLUCOSE RANDOM mg/dL 86 94 96 88 102 108 75 83     Results from last 7 days   Lab Units 12/16/24  0038   OSMOLALITY, SERUM mmol/*     Results from last 7 days   Lab Units 12/15/24  2350   PH SYLVESTER  7.318   PCO2 SYLVESTER mm Hg 53.5*   PO2 SYLVESTER mm Hg 58.8*   HCO3 SYLVESTER mmol/L 26.8   BASE EXC SYLVESTER mmol/L -0.1   O2 CONTENT SYLVESTER ml/dL 16.4   O2 HGB, VENOUS % 88.8*             Results from last 7 days   Lab Units 12/16/24  0157 12/15/24  2343   HS TNI 0HR ng/L  --  3   HS TNI 2HR ng/L <2  --    HSTNI D2 ng/L <-1  --          Results from last 7 days   Lab Units 12/15/24  2343   PROTIME seconds 15.5*   INR  1.19   PTT seconds 28     Results from last 7 days   Lab Units 12/16/24  0157   TSH 3RD GENERATON uIU/mL 2.090     Results from last 7 days   Lab Units 12/19/24  0546 12/18/24  0201 12/17/24  0441 12/16/24  0822 12/15/24  2343   PROCALCITONIN ng/ml 0.31* 0.47* 0.32* 0.11 0.07     Results from last 7 days   Lab Units 12/16/24  0822 12/16/24  0157 12/15/24  2343   LACTIC ACID mmol/L 1.2 1.0 3.1*         Results from  last 7 days   Lab Units 12/15/24  2343   LIPASE u/L 30             Results from last 7 days   Lab Units 12/16/24  0038   OSMOLALITY, SERUM mmol/*   OSMO UR mmol/     Results from last 7 days   Lab Units 12/16/24  0903 12/16/24  0038 12/16/24  0017   CLARITY UA  Clear  --  Clear   COLOR UA  Yellow  --  Yellow   SPEC GRAV UA  1.025  --  >=1.030   PH UA  5.5  --  6.0   GLUCOSE UA mg/dl Negative  --  Negative   KETONES UA mg/dl Negative  --  Negative   BLOOD UA  Small*  --  3+*   PROTEIN UA mg/dl 30 (1+)*  --  Trace*   NITRITE UA  Negative  --  Negative   BILIRUBIN UA  Negative  --  Negative   UROBILINOGEN UA E.U./dl  --   --  0.2   UROBILINOGEN UA (BE) mg/dl <2.0  --   --    LEUKOCYTES UA  Negative  --  Negative   WBC UA /hpf 1-2  --  4-10*   RBC UA /hpf 2-4  --  20-30*   BACTERIA UA /hpf Occasional  --  Occasional   EPITHELIAL CELLS WET PREP /hpf None Seen  --  Occasional   MUCUS THREADS  Occasional*  --  Occasional*   SODIUM UR   --  114  --    CREATININE UR mg/dL  --  138.0  --      Results from last 7 days   Lab Units 12/16/24  0904   STREP PNEUMONIAE ANTIGEN, URINE  Negative   LEGIONELLA URINARY ANTIGEN  Negative         Results from last 7 days   Lab Units 12/16/24  0038   AMPH/METH  Negative   BARBITURATE UR  Negative   BENZODIAZEPINE UR  Positive*   COCAINE UR  Negative   METHADONE URINE  Negative   OPIATE UR  Negative   PCP UR  Negative   THC UR  Positive*     Results from last 7 days   Lab Units 12/16/24  0822   ETHANOL LVL mg/dL <10                 Results from last 7 days   Lab Units 12/16/24  1433 12/16/24  0903 12/16/24  0821 12/15/24  2353 12/15/24  2343   BLOOD CULTURE   --  No Growth at 72 hrs. Staphylococcus epidermidis* No Growth at 72 hrs. No Growth at 72 hrs.   SPUTUM CULTURE  2+ Growth of Pseudomonas aeruginosa*  2+ Growth of Serratia marcescens*  --   --   --   --    GRAM STAIN RESULT  3+ Polys*  2+ Gram positive cocci in pairs*  --  Gram positive cocci in clusters*  --   --               Results from last 7 days   Lab Units 12/18/24  0201   VANCOMYCIN RM ug/mL 18.2       Network Utilization Review Department  ATTENTION: Please call with any questions or concerns to [phone] and carefully listen to the prompts so that you are directed to the right person. All voicemails are confidential.   For Discharge needs, contact Care Management DC Support Team at [phone] opt. 2  Send all requests for admission clinical reviews, approved or denied determinations and any other requests to dedicated fax number below belonging to the [facility] where the patient is receiving treatment. List of dedicated fax numbers for the Facilities:  FACILITY NAME UR FAX NUMBER   ADMISSION DENIALS (Administrative/Medical Necessity) [phone]   DISCHARGE SUPPORT TEAM (NETWORK) [phone]   PARENT CHILD HEALTH (Maternity/NICU/Pediatrics) [phone]   [facility] [phone]   [facility] [phone]   [facility] [phone]   [facility] [phone]   [facility] [phone]   [facility] [phone]   [facility] [phone]   [facility] [phone]   [facility] [phone]   [facility] [phone]   [facility] [phone]   [facility] [phone]

## 2024-12-20 NOTE — PROGRESS NOTES
Critical Care Attending Note; John Sutherland   Note Date: 24  Note Time: 9:59 AM    Patient: Hemanth Swanson  Age, : 39 y.o., 1985 MRN: [id number] Code Status: Level 1 - Full Code Patient Location: ICU 05/ICU 05   Hospital LOS:4 days  ]   Patient seen and examined, medical record reviewed, discussed with house staff and nursing staff.     HPI   CC: AMS  39M with a PMH Seminoma(Orcetomy/Chemo), HTN, HFpEF, DM, Cardiac Arrest(Hypoxic - ), PE, Demyelinating Neuromuscular Disorder(Trach/Peg), recurrent PEG tube dislodgement who presents due to agitation found to have hypernatremia.       Main ICU Plans:       #Neuro  Demyelinating NM disorder/Agitation - Baseline able to eat  - Delirium precautions  - PRN Haldol    Seizure like activity - Rythmic Jaw movement/  - EEG    #Card  Bradycardia - likely secondary to vagaling   - Tele     #Pulm  Respiratory Failure - Mucous plugging - improving  - Aggressive Pulm hygiene regimen - mucomyst, albuterol, chest PT  - LTVV VC - 6-8cc/kg IDBW - TCT daily  - Wean FiO2 - Maintain Oxygen Sat >92%  - VAP Bundle  - HOB > 30o    - PEEP Titrate      Pulmonary embolism  -Eliquis    #Renal  Hypernatremia - Resolved  - FWF    #GI  Bowel regimn    #ID   Bronchitis - Pseudomonas/Serratia  - Cefepime - 3/5 days  - Follow up infectious workup      #DVT/GI ppx  Eliquis    #Lines/Tubes/Drains:   Invasive Devices       Central Venous Catheter Line  Duration             Port A Cath 23 Right Subclavian 647 days              Peripheral Intravenous Line  Duration             Peripheral IV 24 Proximal;Right;Upper;Ventral (anterior) Arm 4 days    Peripheral IV 24 Proximal;Right;Ventral (anterior) Forearm <1 day              Drain  Duration             Gastrostomy/Enterostomy Percutaneous Interventional Gastrostomy 18 Fr.  days    External Urinary Catheter <1 day              Airway  Duration             Surgical Airway Shiley Distal 50 days                     #Nutrition:   Diet Enteral/Parenteral; Tube Feeding No Oral Diet; Jevity 1.5; Continuous; 70; 150; Water; Every 4 hours        #Code Status:   Level 1 - Full Code    #Dispo:   ICU        John Sutherland MD  Pulmonary, Critical Care    Critical care time, excluding procedures, teaching, family meetings, and excludes any prior time recorded by the AP/resident, 35 minutes. Upon my evaluation, this patient has a high probability of imminent or life-threatening deterioration due to above problems which required my direct attention, intervention, and personal management.   Impression/Active Problems:     HTN   HFpEF   DM   Neuromuscualr disorder   Tracheostomy   PEG   Hypernatremia   Aspiration   Vaso vagal    Bradycardia  AMS  Bronchitis       Physical Exam:     Vital Signs:   Weight: 99.2 kg (218 lb 11.1 oz)  IBW: Ideal body weight: 86.8 kg (191 lb 5.7 oz)  Adjusted ideal body weight: 91.8 kg (202 lb 4.7 oz)  Temp:  [95.7 °F (35.4 °C)-97.6 °F (36.4 °C)] 97.6 °F (36.4 °C)  HR:  [53-86] 82  BP: ()/(54-98) 121/65  Resp:  [13-27] 22  SpO2:  [85 %-100 %] 96 %  O2 Device: Ventilator  FiO2 (%):  [35-40] 35  Physical Exam: Unchanged  General: NAD  Neuro: Nodding appropriately   Heart: RRR  Lungs: Non labored  Abdomen: Soft  Extremities: Min edema                Ventilator Settings:    FiO2 (%):  [35-40] 35    Results from last 7 days   Lab Units 12/15/24  2350   PO2 SYLVESTER mm Hg 58.8*     Radiologic Images Reviewed:    CT C/A/P  1.  Complete collapse of the left lower lobe and severe atelectasis of the right lower lobe similar to prior examination. Superimposed aspiration or pneumonia cannot be excluded. Trace left pleural effusion.   2.  2 mm calculus in the distal right ureter just prior to the ureterovesicular junction. No significant hydronephrosis.   3.  Mild thickening of the urinary bladder wall, correlate with urinalysis for cystitis.     CT head  No acute intracranial hemorrhage, midline shift, or mass  "effect.   Input / Output:     Intake/Output Summary (Last 24 hours) at 12/20/2024 0959  Last data filed at 12/20/2024 0801  Gross per 24 hour   Intake 2815 ml   Output 1025 ml   Net 1790 ml            Infusions:       Scheduled Medications:  Current Facility-Administered Medications   Medication Dose Route Frequency Provider Last Rate    acetaminophen  1,000 mg Intravenous Q8H PRN Julissa Spironello V, CRNP      acetylcysteine  3 mL Nebulization Q6H Julissa Spironello V, CRNP      apixaban  5 mg Per PEG Tube BID Carol Ying, CRNP      cefepime  2,000 mg Intravenous Q12H Julissa Spironello V, CRNP Stopped (12/20/24 0315)    chlorhexidine  15 mL Mouth/Throat Q12H ISAEL Tiffani Clarke, CRNP      ipratropium-albuterol  3 mL Nebulization Q6H Julissa Spironello V, CRNP         PRN Medications:    acetaminophen    Labs Reviewed:  Results from last 7 days   Lab Units 12/19/24  0546 12/18/24  0201 12/17/24  0441   WBC Thousand/uL 8.54 7.80 11.52*   HEMOGLOBIN g/dL 11.8* 10.5* 11.0*   HEMATOCRIT % 39.2 34.7* 36.0*   PLATELETS Thousands/uL 200 186 206      Results from last 7 days   Lab Units 12/20/24  0522 12/19/24  0545 12/18/24  0201 12/17/24  0441   SODIUM mmol/L 144 144 150* 153*   CO2 mmol/L 26 28 26 30   BUN mg/dL 14 13 14 17   CALCIUM mg/dL 8.7 8.8 8.7 8.9   MAGNESIUM mg/dL  --  2.2 2.3 2.4   PHOSPHORUS mg/dL  --  4.0 3.3 2.2*         Invalid input(s): \"ASTSGOT\", \"ALTSGPT\"LABRCNTIP@ ,alkphos:3,tbilirubin:3,dbilirubin:3)@  Results from last 7 days   Lab Units 12/15/24  2343   INR  1.19           Invalid input(s): \"TROPT\", \"PBNP\"             I have personally seen and examined the patient on (12/20/24 between 6140-3649). I discussed the patient with the AP/resident including, but not limited to, verifying findings; reviewing labs and x-rays; discussing with consultants; developing the plan of care with the bedside nurse; and discussing treatment plan with patient or surrogate.  I have reviewed the " note and assessment performed by the AP/resident and agree with the AP/resident’s documented findings and plan of care with the above additions/exceptions. Please see my comments for details and adjustments.

## 2024-12-20 NOTE — ASSESSMENT & PLAN NOTE
Possibly secondary to traumatic Sheppard insertion?  Resolved after holding Eliquis  Eliquis resumed 12/19. Urine remains clear yellow  Monitor

## 2024-12-20 NOTE — ASSESSMENT & PLAN NOTE
Presents with sodium of 162  Per chart review appears patient was on p.o. regimen  Sodium now corrected after FWF  Continue nutrition/FWF per PEG  Trend BMP while admitted

## 2024-12-20 NOTE — PROGRESS NOTES
Progress Note - Critical Care/ICU   Name: Hemanth Swanson 39 y.o. male I MRN: [id number]  Unit/Bed#: ICU 05 I Date of Admission: 12/16/2024   Date of Service: 12/20/2024 I Hospital Day: 4      Assessment & Plan  Toxic metabolic encephalopathy  Patient with history of waxing and waning agitation   Required 8 mg IM Versed + 2 mg IV versed at SLE  CT negative  UDS + THC, benzos   MRI during previous admission revealed images consistent with anoxic brain injury  Encephalopathy now resolved with treatment  Hypernatremia now corrected    Plan:  Limit sedatives that can cause bradycardia; as patient has history of vagal/bradycardiac events   Delirium precautions  CAM ICU, fall precautions  Neuro checks per protocol   Acute on chronic respiratory failure with hypoxia and hypercapnia (HCC)  S/p trach for NM disease. Baseline Vent dependent. Home settings appear ACVC 14/500/40/6 (?8). Has had HR abnormalities and has not tolerated BiPap per chart review   Trach 6XL  History of significant mucous plugs  CT chest revealing complete collapse of the left lower lobe and severe atelectasis of the right lower lobe similar to prior examination. Superimposed aspiration or pneumonia cannot be excluded. Trace left pleural effusion.     Plan:  Continue ACVC 14/500/40/6+ (home settings)  Had episodes of hypoxia/mucous plugging 12/18  Aggressive pulmonary hygiene   Mucomyst, albuterol  Chest therapy  Frequent suctioning/turning  Failed trach collar x2 on 12/17. Failed SBT 12/19 with hypoxia to 40%.  Would avoid further SBT's given chronic neuro muscular deficits, and baseline vent requirements   Antibiotics as above  F/u OP with Pulmonology   Hypernatremia  Presents with sodium of 162  Per chart review appears patient was on p.o. 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  Resolved after holding Eliquis  Eliquis resumed 12/19. Urine remains clear yellow  Monitor   Sepsis (HCC)  Tachypnea, leukocytosis. HD stable. Lactic 3.1-> 1  ED course: 1L Isolyte, Zosyn, Vanco  Source: Pulmonary  CT chest: complete collapse of the left lower lobe and severe atelectasis of the right lower lobe similar to prior examination. Superimposed aspiration or pneumonia cannot be excluded. Trace left pleural effusion   Sputum culture: pseudomonas and serratia- sensitivities pending  1 of 2 BC's 12/16 + staph epi- likely contaminant  HD stable  Ongoing hydration via PEG    Plan  Treatment for pneumonia as above  Trend CBC, temperature curve  Pneumonia due to Pseudomonas and Serratia  12/16 CT chest revealing complete collapse of the left lower lobe and severe atelectasis of the right lower lobe similar to prior examination. Superimposed aspiration or pneumonia cannot be excluded. Trace left pleural effusion.   12/16 Sputum culture + Pseudomonas and Serratia- sensitivities pending    Plan  Cefepime D3 of 5  Aggressive chest PT, mucomyst/albuterol  Rest of plan as outlined below    Mixed axonal-demyelinating polyneuropathy  Trach and PEG in place  PT/OT  Disposition: Critical care    ICU Core Measures     Vented Patient  VAP Bundle  VAP bundle ordered     A: Assess, Prevent, and Manage Pain Has pain been assessed? Yes  Need for changes to pain regimen? No   B: Both Spontaneous Awakening Trials (SATs) and Spontaneous Breathing Trials (SBTs) Plan to perform spontaneous awakening trial today? N/A   Plan to perform spontaneous breathing trial today?  Chronic vent   Obvious barriers to extubation? Yes   C: Choice of Sedation RASS Goal: N/A patient not on sedation  Need for changes to sedation or analgesia regimen? NA   D: Delirium CAM-ICU: Negative   E: Early Mobility  Plan for early mobility? Yes   F: Family Engagement Plan for family engagement today? Yes       Antibiotic Review: Patient on appropriate coverage based on culture data.  and Continue broad spectrum secondary to severity of illness.     Review of Invasive Devices:      Central access plan:  SQ port in place, not accessed      Prophylaxis:  VTE VTE covered by:  apixaban, Per PEG Tube, 5 mg at 12/19/24 1724       Stress Ulcer  not ordered         24 Hour Events : Continues on home vent settings, ACVC 14/500/40/6. Became hypoxic yesterday with SBT trial, required BVM to improve SpO2. Had brief shaking episode concerning for ? Seizure when hypoxic, then resolved with improvement in oxygenation. Had a similar episode this morning with hypoxic episode that was again self limiting after hypoxia resolved. He was immediately interactive when shaking stopped. Hypernatremia resolved with TF/FWF regimen. AM labs pending. No other changes overnight.     Subjective   Review of Systems: See HPI for Review of Systems    Objective :                   Vitals I/O      Most Recent Min/Max in 24hrs   Temp (!) 96.9 °F (36.1 °C) Temp  Min: 95.7 °F (35.4 °C)  Max: 97.2 °F (36.2 °C)   Pulse 75 Pulse  Min: 53  Max: 86   Resp 19 Resp  Min: 13  Max: 31   /83 BP  Min: 87/54  Max: 161/71   O2 Sat 95 % SpO2  Min: 74 %  Max: 100 %      Intake/Output Summary (Last 24 hours) at 12/20/2024 0546  Last data filed at 12/20/2024 0524  Gross per 24 hour   Intake 3090 ml   Output 995 ml   Net 2095 ml       Diet Enteral/Parenteral; Tube Feeding No Oral Diet; Jevity 1.5; Continuous; 70; 150; Water; Every 4 hours    Invasive Monitoring   N/A        Physical Exam   Physical Exam  Vitals and nursing note reviewed.   Eyes:      General:  Lids are normal.      Extraocular Movements: Extraocular movements intact.   Skin:     General: Skin is warm and dry.      Coloration: Skin is pale.   HENT:      Head: Normocephalic and atraumatic.      Mouth/Throat:      Mouth: Mucous membranes are moist.   Cardiovascular:      Rate and Rhythm: Normal rate and regular rhythm.      Pulses:           Radial pulses are 2+ on the right side and 2+ on the left side.        Dorsalis pedis pulses are 2+ on the right side and 2+ on the left side.      Heart sounds: Normal heart sounds.   Musculoskeletal:      Right lower leg: No edema.      Left lower leg: No edema.   Abdominal: General: Bowel sounds are normal.      Palpations: Abdomen is soft.      Comments: PEG in place, TF/FWF running at goal.     Constitutional:       General: He is not in acute distress.     Appearance: He is ill-appearing.      Comments: Trach in place, connected to vent.    Pulmonary:      Effort: Pulmonary effort is normal. No accessory muscle usage, respiratory distress or accessory muscle usage.      Breath sounds: Decreased breath sounds present.   Psychiatric:         Behavior: Behavior is cooperative.   Neurological:      General: No focal deficit present.      Mental Status: He is alert.      GCS: GCS eye subscore is 4. GCS verbal subscore is 1. GCS motor subscore is 6.      Comments: Generally weak. Unable to lift arms off of pillows for extended time. Wiggles toes, unable to lift against gravity.  and Nods appropriately to orientation questions.   Genitourinary/Anorectal:     Comments: External urinary catheter in place, draining clear yellow urine          Diagnostic Studies        Lab Results: I have reviewed the following results:     Medications:  Scheduled PRN   acetylcysteine, 3 mL, Q6H  apixaban, 5 mg, BID  cefepime, 2,000 mg, Q12H  chlorhexidine, 15 mL, Q12H ISAEL  ipratropium-albuterol, 3 mL, Q6H      acetaminophen, 1,000 mg, Q8H PRN       Continuous          Labs:   CBC    Recent  Labs     12/19/24  0546   WBC 8.54   HGB 11.8*   HCT 39.2        BMP    Recent Labs     12/19/24  0545   SODIUM 144   K 4.1   *   CO2 28   AGAP 5   BUN 13   CREATININE 0.51*   CALCIUM 8.8       Coags    No recent results     Additional Electrolytes  Recent Labs     12/19/24  0545   MG 2.2   PHOS 4.0          Blood Gas    No recent results  No recent results LFTs  No recent results    Infectious  Recent Labs     12/19/24  0546   PROCALCITONI 0.31*     Glucose  Recent Labs     12/19/24  0545   GLUC 94

## 2024-12-20 NOTE — ASSESSMENT & PLAN NOTE
Patient with history of waxing and waning agitation   Required 8 mg IM Versed + 2 mg IV versed at SLE  CT negative  UDS + THC, benzos   MRI during previous admission revealed images consistent with anoxic brain injury  Encephalopathy now resolved with treatment  Hypernatremia now corrected    Plan:  Limit sedatives that can cause bradycardia; as patient has history of vagal/bradycardiac events   Delirium precautions  CAM ICU, fall precautions  Neuro checks per protocol    Nasalis-Muscle-Based Myocutaneous Island Pedicle Flap Text: Using a #15 blade, an incision was made around the donor flap to the level of the nasalis muscle. Wide lateral undermining was then performed in both the subcutaneous plane above the nasalis muscle, and in a submuscular plane just above periosteum. This allowed the formation of a free nasalis muscle axial pedicle (based on the angular artery) which was still attached to the actual cutaneous flap, increasing its mobility and vascular viability. Hemostasis was obtained with pinpoint electrocoagulation. The flap was mobilized into position and the pivotal anchor points positioned and stabilized with buried interrupted sutures. Subcutaneous and dermal tissues were closed in a multilayered fashion with sutures. Tissue redundancies were excised, and the epidermal edges were apposed without significant tension and sutured with sutures.

## 2024-12-20 NOTE — PROGRESS NOTES
Patient:    MRN:  [id number]    [place]aur[facility] Request ID:  [id number]    Level of care reserved:  Home Health Agency    Partner Reserved:  [facility] [place] [address] [phone]    Clinical needs requested:    Geography searched:  [phone]    Start of Service:    Request sent:  9:30am EST on 12/20/2024 by Kirsty Elizondo    Partner reserved:  12:56pm EST on 12/20/2024 by Kirsty Elizondo    Choice list shared:  12:51pm EST on 12/20/2024 by Kirsty Elizondo

## 2024-12-20 NOTE — ASSESSMENT & PLAN NOTE
12/16 CT chest revealing complete collapse of the left lower lobe and severe atelectasis of the right lower lobe similar to prior examination. Superimposed aspiration or pneumonia cannot be excluded. Trace left pleural effusion.   12/16 Sputum culture + Pseudomonas and Serratia- sensitivities pending    Plan  Cefepime D3 of 5  Aggressive chest PT, mucomyst/albuterol  Rest of plan as outlined below

## 2024-12-20 NOTE — ASSESSMENT & PLAN NOTE
S/p trach for NM disease. Baseline Vent dependent. Home settings appear ACVC 14/500/40/6 (?8). Has had HR abnormalities and has not tolerated BiPap per chart review   Trach 6XL  History of significant mucous plugs  CT chest revealing complete collapse of the left lower lobe and severe atelectasis of the right lower lobe similar to prior examination. Superimposed aspiration or pneumonia cannot be excluded. Trace left pleural effusion.     Plan:  Continue ACVC 14/500/40/6+ (home settings)  Had episodes of hypoxia/mucous plugging 12/18  Aggressive pulmonary hygiene   Mucomyst, albuterol  Chest therapy  Frequent suctioning/turning  Failed trach collar x2 on 12/17. Failed SBT 12/19 with hypoxia to 40%.  Would avoid further SBT's given chronic neuro muscular deficits, and baseline vent requirements   Antibiotics as above  F/u OP with Pulmonology

## 2024-12-21 PROBLEM — E87.0 HYPERNATREMIA: Status: RESOLVED | Noted: 2024-10-06 | Resolved: 2024-12-21

## 2024-12-21 PROBLEM — R45.1 AGITATION: Status: ACTIVE | Noted: 2024-12-21

## 2024-12-21 LAB
ANION GAP SERPL CALCULATED.3IONS-SCNC: 4 MMOL/L (ref 4–13)
BACTERIA BLD CULT: NORMAL
BUN SERPL-MCNC: 13 MG/DL (ref 5–25)
CALCIUM SERPL-MCNC: 8.7 MG/DL (ref 8.4–10.2)
CHLORIDE SERPL-SCNC: 107 MMOL/L (ref 96–108)
CO2 SERPL-SCNC: 32 MMOL/L (ref 21–32)
CREAT SERPL-MCNC: 0.41 MG/DL (ref 0.6–1.3)
ERYTHROCYTE [DISTWIDTH] IN BLOOD BY AUTOMATED COUNT: 15.1 % (ref 11.6–15.1)
GFR SERPL CREATININE-BSD FRML MDRD: 150 ML/MIN/1.73SQ M
GLUCOSE SERPL-MCNC: 102 MG/DL (ref 65–140)
HCT VFR BLD AUTO: 31.9 % (ref 36.5–49.3)
HGB BLD-MCNC: 10 G/DL (ref 12–17)
MAGNESIUM SERPL-MCNC: 2.1 MG/DL (ref 1.9–2.7)
MCH RBC QN AUTO: 30.6 PG (ref 26.8–34.3)
MCHC RBC AUTO-ENTMCNC: 31.3 G/DL (ref 31.4–37.4)
MCV RBC AUTO: 98 FL (ref 82–98)
PLATELET # BLD AUTO: 186 THOUSANDS/UL (ref 149–390)
PMV BLD AUTO: 10.7 FL (ref 8.9–12.7)
POTASSIUM SERPL-SCNC: 3.8 MMOL/L (ref 3.5–5.3)
RBC # BLD AUTO: 3.27 MILLION/UL (ref 3.88–5.62)
SODIUM SERPL-SCNC: 143 MMOL/L (ref 135–147)
WBC # BLD AUTO: 13.29 THOUSAND/UL (ref 4.31–10.16)

## 2024-12-21 PROCEDURE — NC001 PR NO CHARGE: Performed by: STUDENT IN AN ORGANIZED HEALTH CARE EDUCATION/TRAINING PROGRAM

## 2024-12-21 PROCEDURE — 94760 N-INVAS EAR/PLS OXIMETRY 1: CPT

## 2024-12-21 PROCEDURE — 94150 VITAL CAPACITY TEST: CPT

## 2024-12-21 PROCEDURE — 94664 DEMO&/EVAL PT USE INHALER: CPT

## 2024-12-21 PROCEDURE — 80048 BASIC METABOLIC PNL TOTAL CA: CPT | Performed by: NURSE PRACTITIONER

## 2024-12-21 PROCEDURE — 94640 AIRWAY INHALATION TREATMENT: CPT

## 2024-12-21 PROCEDURE — 99233 SBSQ HOSP IP/OBS HIGH 50: CPT | Performed by: STUDENT IN AN ORGANIZED HEALTH CARE EDUCATION/TRAINING PROGRAM

## 2024-12-21 PROCEDURE — 85027 COMPLETE CBC AUTOMATED: CPT | Performed by: NURSE PRACTITIONER

## 2024-12-21 PROCEDURE — 94003 VENT MGMT INPAT SUBQ DAY: CPT

## 2024-12-21 PROCEDURE — 94668 MNPJ CHEST WALL SBSQ: CPT

## 2024-12-21 PROCEDURE — 83735 ASSAY OF MAGNESIUM: CPT | Performed by: NURSE PRACTITIONER

## 2024-12-21 PROCEDURE — 94669 MECHANICAL CHEST WALL OSCILL: CPT

## 2024-12-21 RX ORDER — LORAZEPAM 2 MG/ML
1 INJECTION INTRAMUSCULAR ONCE
Status: COMPLETED | OUTPATIENT
Start: 2024-12-21 | End: 2024-12-21

## 2024-12-21 RX ORDER — QUETIAPINE FUMARATE 25 MG/1
25 TABLET, FILM COATED ORAL 2 TIMES DAILY
Status: DISCONTINUED | OUTPATIENT
Start: 2024-12-21 | End: 2024-12-23

## 2024-12-21 RX ORDER — LORAZEPAM 2 MG/ML
INJECTION INTRAMUSCULAR
Status: COMPLETED
Start: 2024-12-21 | End: 2024-12-21

## 2024-12-21 RX ORDER — DEXMEDETOMIDINE HYDROCHLORIDE 4 UG/ML
.1-1 INJECTION, SOLUTION INTRAVENOUS
Status: DISPENSED | OUTPATIENT
Start: 2024-12-21 | End: 2024-12-24

## 2024-12-21 RX ADMIN — ACETYLCYSTEINE 600 MG: 200 SOLUTION ORAL; RESPIRATORY (INHALATION) at 07:52

## 2024-12-21 RX ADMIN — QUETIAPINE FUMARATE 25 MG: 25 TABLET ORAL at 17:11

## 2024-12-21 RX ADMIN — LORAZEPAM 1 MG: 2 INJECTION INTRAMUSCULAR; INTRAVENOUS at 04:01

## 2024-12-21 RX ADMIN — APIXABAN 5 MG: 5 TABLET, FILM COATED ORAL at 17:11

## 2024-12-21 RX ADMIN — Medication 6 MG: at 22:04

## 2024-12-21 RX ADMIN — IPRATROPIUM BROMIDE AND ALBUTEROL SULFATE 3 ML: .5; 3 SOLUTION RESPIRATORY (INHALATION) at 19:05

## 2024-12-21 RX ADMIN — APIXABAN 5 MG: 5 TABLET, FILM COATED ORAL at 08:14

## 2024-12-21 RX ADMIN — LORAZEPAM 1 MG: 2 INJECTION INTRAMUSCULAR at 04:01

## 2024-12-21 RX ADMIN — IPRATROPIUM BROMIDE AND ALBUTEROL SULFATE 3 ML: .5; 3 SOLUTION RESPIRATORY (INHALATION) at 07:53

## 2024-12-21 RX ADMIN — ACETYLCYSTEINE 600 MG: 200 SOLUTION ORAL; RESPIRATORY (INHALATION) at 19:05

## 2024-12-21 RX ADMIN — DEXMEDETOMIDINE HYDROCHLORIDE 0.2 MCG/KG/HR: 400 INJECTION INTRAVENOUS at 07:49

## 2024-12-21 RX ADMIN — IPRATROPIUM BROMIDE AND ALBUTEROL SULFATE 3 ML: .5; 3 SOLUTION RESPIRATORY (INHALATION) at 13:20

## 2024-12-21 RX ADMIN — IPRATROPIUM BROMIDE AND ALBUTEROL SULFATE 3 ML: .5; 3 SOLUTION RESPIRATORY (INHALATION) at 02:41

## 2024-12-21 RX ADMIN — ACETYLCYSTEINE 600 MG: 200 SOLUTION ORAL; RESPIRATORY (INHALATION) at 13:20

## 2024-12-21 RX ADMIN — CEFEPIME HYDROCHLORIDE 2000 MG: 2 INJECTION, SOLUTION INTRAVENOUS at 02:24

## 2024-12-21 RX ADMIN — CEFEPIME HYDROCHLORIDE 2000 MG: 2 INJECTION, SOLUTION INTRAVENOUS at 13:32

## 2024-12-21 RX ADMIN — DEXMEDETOMIDINE HYDROCHLORIDE 0.3 MCG/KG/HR: 400 INJECTION INTRAVENOUS at 17:11

## 2024-12-21 RX ADMIN — CHLORHEXIDINE GLUCONATE 15 ML: 1.2 RINSE ORAL at 08:14

## 2024-12-21 RX ADMIN — CHLORHEXIDINE GLUCONATE 15 ML: 1.2 RINSE ORAL at 22:12

## 2024-12-21 RX ADMIN — ACETYLCYSTEINE 600 MG: 200 SOLUTION ORAL; RESPIRATORY (INHALATION) at 02:41

## 2024-12-21 RX ADMIN — QUETIAPINE FUMARATE 25 MG: 25 TABLET ORAL at 07:51

## 2024-12-21 NOTE — PROGRESS NOTES
Progress Note - Critical Care/ICU   Name: Hemanth Swanson 39 y.o. male I MRN: [id number]  Unit/Bed#: ICU 05 I Date of Admission: 12/16/2024   Date of Service: 12/21/2024 I Hospital Day: 5      Assessment & Plan  Toxic metabolic encephalopathy  Patient with history of waxing and waning agitation   Required 8 mg IM Versed + 2 mg IV versed at SLE  CT negative  UDS + THC, benzos   MRI during previous admission revealed images consistent with anoxic brain injury  Encephalopathy now resolved with treatment  Hypernatremia now corrected    Plan:  Limit sedatives that can cause bradycardia; as patient has history of vagal/bradycardiac events   Delirium precautions  CAM ICU, fall precautions  Neuro checks per protocol   ?episode of shaking but immediately following commands appears inconsistent with seizure activity. EEG ordered  Acute on chronic respiratory failure with hypoxia and hypercapnia (HCC)  S/p trach for NM disease. Baseline Vent dependent. Home settings appear ACVC 14/500/40/6 (?8). Has had HR abnormalities and has not tolerated BiPap per chart review   Trach 6XL  History of significant mucous plugs  CT chest revealing complete collapse of the left lower lobe and severe atelectasis of the right lower lobe similar to prior examination. Superimposed aspiration or pneumonia cannot be excluded. Trace left pleural effusion.     Plan:  Continue ACVC 14/500/40/6+ (home settings)  Had episodes of hypoxia/mucous plugging 12/18  Aggressive pulmonary hygiene   Mucomyst, albuterol  Chest therapy  Frequent suctioning/turning  Failed trach collar x2 on 12/17. Failed SBT 12/19 with hypoxia to 40%.  Would avoid further SBT's given chronic neuro muscular deficits, and baseline vent requirements   Antibiotics as above  F/u OP with Pulmonology   Umbilical hernia without obstruction and without gangrene  Chronic, deemed not a surgical candidate  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HCC)  S/p resection and chemo  S/P percutaneous endoscopic  gastrostomy (PEG) tube placement (HCC)  PEG tube re-placed 12/10 after dislodgment  Continue TF and FWF  History of pulmonary embolus (PE)  Eliquis resumed  Hypokalemia  Suspect a component of malnutrition  Replete as needed  Maintain K>4  Hematuria  Possibly secondary to traumatic Sheppard insertion?  Resolved after holding Eliquis  Eliquis resumed 12/19. Urine remains clear yellow  Monitor   Sepsis (HCC)  Tachypnea, leukocytosis. HD stable. Lactic 3.1-> 1  ED course: 1L Isolyte, Zosyn, Vanco  Source: Pulmonary  CT chest: complete collapse of the left lower lobe and severe atelectasis of the right lower lobe similar to prior examination. Superimposed aspiration or pneumonia cannot be excluded. Trace left pleural effusion   Sputum culture: pseudomonas and serratia- sensitivities pending  1 of 2 BC's 12/16 + staph epi- likely contaminant  HD stable  Ongoing hydration via PEG    Plan  Treatment for pneumonia as above  Trend CBC, temperature curve  Pneumonia due to Pseudomonas and Serratia  12/16 CT chest revealing complete collapse of the left lower lobe and severe atelectasis of the right lower lobe similar to prior examination. Superimposed aspiration or pneumonia cannot be excluded. Trace left pleural effusion.   12/16 Sputum culture + Pseudomonas and Serratia- sensitivities pending    Plan  Cefepime D4 of 5  Aggressive chest PT, mucomyst/albuterol  Rest of plan as outlined below    Mixed axonal-demyelinating polyneuropathy  Trach and PEG in place  PT/OT  Disposition: Critical care    ICU Core Measures     Vented Patient  VAP Bundle  VAP bundle ordered     A: Assess, Prevent, and Manage Pain Has pain been assessed? Yes  Need for changes to pain regimen? No   B: Both Spontaneous Awakening Trials (SATs) and Spontaneous Breathing Trials (SBTs) Plan to perform spontaneous awakening trial today? No secondary to severe hypoxia/hypocapnia   Plan to perform spontaneous breathing trial today? No secondary to severe  hypoxia/hypocapnia   Obvious barriers to extubation? Yes   C: Choice of Sedation RASS Goal: 0 Alert and Calm  Need for changes to sedation or analgesia regimen? No   D: Delirium CAM-ICU: Negative   E: Early Mobility  Plan for early mobility? Yes   F: Family Engagement Plan for family engagement today? Yes       Antibiotic Review: Patient on appropriate coverage based on culture data.     Review of Invasive Devices:      Central access plan:  chronic      Prophylaxis:  VTE VTE covered by:  apixaban, Per PEG Tube, 5 mg at 12/20/24 1713       Stress Ulcer  not ordered         24 Hour Events : episodes of ?shaking overnight as well as agitation. Followed commands shortly after so unclear if true seizure activity  Subjective   Review of Systems: See HPI for Review of Systems    Objective :                   Vitals I/O      Most Recent Min/Max in 24hrs   Temp (!) 96 °F (35.6 °C) Temp  Min: 96 °F (35.6 °C)  Max: 97.7 °F (36.5 °C)   Pulse 94 Pulse  Min: 64  Max: 104   Resp 18 Resp  Min: 12  Max: 38   /88 BP  Min: 76/45  Max: 161/79   O2 Sat 93 % SpO2  Min: 90 %  Max: 99 %      Intake/Output Summary (Last 24 hours) at 12/21/2024 0509  Last data filed at 12/20/2024 2000  Gross per 24 hour   Intake 3780 ml   Output 530 ml   Net 3250 ml       Diet Enteral/Parenteral; Tube Feeding No Oral Diet; Jevity 1.5; Continuous; 70; 150; Water; Every 4 hours    Invasive Monitoring           Physical Exam   Physical Exam  Vitals and nursing note reviewed.   Eyes:      Pupils: Pupils are equal, round, and reactive to light.   HENT:      Head: Normocephalic and atraumatic.      Mouth/Throat:      Mouth: Mucous membranes are moist.   Cardiovascular:      Rate and Rhythm: Normal rate.      Pulses: Normal pulses.   Abdominal:      Palpations: Abdomen is soft.      Tenderness: There is no abdominal tenderness.   Constitutional:       General: He is not in acute distress.     Appearance: He is well-developed.   Pulmonary:      Effort:  Pulmonary effort is normal. No respiratory distress.   Neurological:      General: No focal deficit present.      Mental Status: He is alert.          Diagnostic Studies        Lab Results: I have reviewed the following results:     Medications:  Scheduled PRN   acetylcysteine, 3 mL, Q6H  apixaban, 5 mg, BID  cefepime, 2,000 mg, Q12H  chlorhexidine, 15 mL, Q12H ISAEL  ipratropium-albuterol, 3 mL, Q6H      acetaminophen, 1,000 mg, Q8H PRN       Continuous          Labs:   CBC    Recent Labs     12/19/24  0546   WBC 8.54   HGB 11.8*   HCT 39.2        BMP    Recent Labs     12/19/24  0545 12/20/24  0522   SODIUM 144 144   K 4.1 4.3   * 111*   CO2 28 26   AGAP 5 7   BUN 13 14   CREATININE 0.51* 0.43*   CALCIUM 8.8 8.7       Coags    No recent results     Additional Electrolytes  Recent Labs     12/19/24  0545   MG 2.2   PHOS 4.0          Blood Gas    No recent results  No recent results LFTs  No recent results    Infectious  Recent Labs     12/19/24  0546   PROCALCITONI 0.31*     Glucose  Recent Labs     12/19/24  0545 12/20/24  0522   GLUC 94 86

## 2024-12-21 NOTE — PLAN OF CARE
Problem: RESPIRATORY - ADULT  Goal: Achieves optimal ventilation and oxygenation  Description: INTERVENTIONS:  - Assess for changes in respiratory status  - Assess for changes in mentation and behavior  - Position to facilitate oxygenation and minimize respiratory effort  - Oxygen administered by appropriate delivery if ordered  - Initiate smoking cessation education as indicated  - Encourage broncho-pulmonary hygiene including cough, deep breathe, Incentive Spirometry  - Assess the need for suctioning and aspirate as needed  - Assess and instruct to report SOB or any respiratory difficulty  - Respiratory Therapy support as indicated  Outcome: Progressing     Problem: Prexisting or High Potential for Compromised Skin Integrity  Goal: Skin integrity is maintained or improved  Description: INTERVENTIONS:  - Identify patients at risk for skin breakdown  - Assess and monitor skin integrity  - Assess and monitor nutrition and hydration status  - Monitor labs   - Assess for incontinence   - Turn and reposition patient  - Assist with mobility/ambulation  - Relieve pressure over bony prominences  - Avoid friction and shearing  - Provide appropriate hygiene as needed including keeping skin clean and dry  - Evaluate need for skin moisturizer/barrier cream  - Collaborate with interdisciplinary team   - Patient/family teaching  - Consider wound care consult   Outcome: Progressing     Problem: PAIN - ADULT  Goal: Verbalizes/displays adequate comfort level or baseline comfort level  Description: Interventions:  - Encourage patient to monitor pain and request assistance  - Assess pain using appropriate pain scale  - Administer analgesics based on type and severity of pain and evaluate response  - Implement non-pharmacological measures as appropriate and evaluate response  - Consider cultural and social influences on pain and pain management  - Notify physician/advanced practitioner if interventions unsuccessful or patient  reports new pain  Outcome: Progressing     Problem: INFECTION - ADULT  Goal: Absence or prevention of progression during hospitalization  Description: INTERVENTIONS:  - Assess and monitor for signs and symptoms of infection  - Monitor lab/diagnostic results  - Monitor all insertion sites, i.e. indwelling lines, tubes, and drains  - Monitor endotracheal if appropriate and nasal secretions for changes in amount and color  - Purlear appropriate cooling/warming therapies per order  - Administer medications as ordered  - Instruct and encourage patient and family to use good hand hygiene technique  - Identify and instruct in appropriate isolation precautions for identified infection/condition  Outcome: Progressing  Goal: Absence of fever/infection during neutropenic period  Description: INTERVENTIONS:  - Monitor WBC    Outcome: Progressing     Problem: SAFETY ADULT  Goal: Patient will remain free of falls  Description: INTERVENTIONS:  - Educate patient/family on patient safety including physical limitations  - Instruct patient to call for assistance with activity   - Consult OT/PT to assist with strengthening/mobility   - Keep Call bell within reach  - Keep bed low and locked with side rails adjusted as appropriate  - Keep care items and personal belongings within reach  - Initiate and maintain comfort rounds  - Make Fall Risk Sign visible to staff  - Offer Toileting every 2 Hours, in advance of need  - Initiate/Maintain bed alarm  - Obtain necessary fall risk management equipment: yellow socks  - Apply yellow socks and bracelet for high fall risk patients  - Consider moving patient to room near nurses station  Outcome: Progressing  Goal: Maintain or return to baseline ADL function  Description: INTERVENTIONS:  -  Assess patient's ability to carry out ADLs; assess patient's baseline for ADL function and identify physical deficits which impact ability to perform ADLs (bathing, care of mouth/teeth, toileting, grooming,  dressing, etc.)  - Assess/evaluate cause of self-care deficits   - Assess range of motion  - Assess patient's mobility; develop plan if impaired  - Assess patient's need for assistive devices and provide as appropriate  - Encourage maximum independence but intervene and supervise when necessary  - Involve family in performance of ADLs  - Assess for home care needs following discharge   - Consider OT consult to assist with ADL evaluation and planning for discharge  - Provide patient education as appropriate  Outcome: Progressing  Goal: Maintains/Returns to pre admission functional level  Description: INTERVENTIONS:  - Perform AM-PAC 6 Click Basic Mobility/ Daily Activity assessment daily.  - Set and communicate daily mobility goal to care team and patient/family/caregiver.   - Collaborate with rehabilitation services on mobility goals if consulted  - Perform Range of Motion 4 times a day.  - Reposition patient every 2 hours.  - Dangle patient 3 times a day  - Stand patient 3 times a day  - Ambulate patient 3 times a day  - Out of bed to chair 3 times a day   - Out of bed for meals 3 times a day  - Out of bed for toileting  - Record patient progress and toleration of activity level   Outcome: Progressing     Problem: DISCHARGE PLANNING  Goal: Discharge to home or other facility with appropriate resources  Description: INTERVENTIONS:  - Identify barriers to discharge w/patient and caregiver  - Arrange for needed discharge resources and transportation as appropriate  - Identify discharge learning needs (meds, wound care, etc.)  - Arrange for interpretive services to assist at discharge as needed  - Refer to Case Management Department for coordinating discharge planning if the patient needs post-hospital services based on physician/advanced practitioner order or complex needs related to functional status, cognitive ability, or social support system  Outcome: Progressing     Problem: Knowledge Deficit  Goal:  Patient/family/caregiver demonstrates understanding of disease process, treatment plan, medications, and discharge instructions  Description: Complete learning assessment and assess knowledge base.  Interventions:  - Provide teaching at level of understanding  - Provide teaching via preferred learning methods  Outcome: Progressing     Problem: Nutrition/Hydration-ADULT  Goal: Nutrient/Hydration intake appropriate for improving, restoring or maintaining nutritional needs  Description: Monitor and assess patient's nutrition/hydration status for malnutrition. Collaborate with interdisciplinary team and initiate plan and interventions as ordered.  Monitor patient's weight and dietary intake as ordered or per policy. Utilize nutrition screening tool and intervene as necessary. Determine patient's food preferences and provide high-protein, high-caloric foods as appropriate.     INTERVENTIONS:  - Monitor oral intake, urinary output, labs, and treatment plans  - Assess nutrition and hydration status and recommend course of action  - Evaluate amount of meals eaten  - Assist patient with eating if necessary   - Allow adequate time for meals  - Recommend/ encourage appropriate diets, oral nutritional supplements, and vitamin/mineral supplements  - Order, calculate, and assess calorie counts as needed  - Recommend, monitor, and adjust tube feedings and TPN/PPN based on assessed needs  - Assess need for intravenous fluids  - Provide specific nutrition/hydration education as appropriate  - Include patient/family/caregiver in decisions related to nutrition  Outcome: Progressing     Problem: SAFETY,RESTRAINT: NV/NON-SELF DESTRUCTIVE BEHAVIOR  Goal: Remains free of harm/injury (restraint for non violent/non self-detsructive behavior)  Description: INTERVENTIONS:  - Instruct patient/family regarding restraint use   - Assess and monitor physiologic and psychological status   - Provide interventions and comfort measures to meet  assessed patient needs   - Identify and implement measures to help patient regain control  - Assess readiness for release of restraint   Outcome: Progressing  Goal: Returns to optimal restraint-free functioning  Description: INTERVENTIONS:  - Assess the patient's behavior and symptoms that indicate continued need for restraint  - Identify and implement measures to help patient regain control  - Assess readiness for release of restraint   Outcome: Progressing

## 2024-12-21 NOTE — ASSESSMENT & PLAN NOTE
Family reports that Hemanth has had episodes of agitation at home   While hospitalized, he has had multiple episodes of the same. During agitation events, he holds his breath and desaturates. He also tries to hit and kick staff so now he is restrained for safety. He denies pain, respiratory distress during these episodes. Minimal secretions suctioned. No clear prompting events lead to agitation  Seroquel BID started on 12/21  Psychiatry consulted

## 2024-12-21 NOTE — ASSESSMENT & PLAN NOTE
Patient with history of waxing and waning agitation   Required 8 mg IM Versed + 2 mg IV versed at SLE  CT negative  UDS + THC, benzos   MRI during previous admission revealed images consistent with anoxic brain injury  Encephalopathy now resolved with treatment  Hypernatremia now corrected    Plan:  Limit sedatives that can cause bradycardia; as patient has history of vagal/bradycardiac events   Delirium precautions  CAM ICU, fall precautions  Neuro checks per protocol   ?episode of shaking but immediately following commands appears inconsistent with seizure activity. EEG ordered

## 2024-12-21 NOTE — ASSESSMENT & PLAN NOTE
Suspect a component of malnutrition  Replete as needed  Maintain K>4   Patient with underlying metastatic lung adenocarcinoma with chronic diarrhea and frequent falls, overall appears very frail.    - dietitian consulted - rec ensure enlive TID  - PT/OT consulted, rec SASHA  - palliative consult: DNR/DNI  - contact family  - incentive spirometry

## 2024-12-21 NOTE — PROGRESS NOTES
Critical Care Attending Note; John Sutherland   Note Date: 24  Note Time: 8:46 AM    Patient: Hemanth Swanson  Age, : 39 y.o., 1985 MRN: [id number] Code Status: Level 1 - Full Code Patient Location: ICU 05/ICU 05   Hospital LOS:5 days  ]   Patient seen and examined, medical record reviewed, discussed with house staff and nursing staff.     HPI   CC: AMS  39M with a PMH Seminoma(Orcetomy/Chemo), HTN, HFpEF, DM, Cardiac Arrest(Hypoxic - ), PE, Demyelinating Neuromuscular Disorder(Trach/Peg), recurrent PEG tube dislodgement who presents due to agitation found to have hypernatremia.       Main ICU Plans:       #Neuro  Demyelinating NM disorder/Agitation - Baseline able to eat, outbursts with rhytmic motion but not responsive to ativan.   - Delirium precautions - Psych consult  - EEG  - Seroquel   - PRN Ativan    #Card  Bradycardia - likely secondary to vagaling   - Tele     #Pulm  Respiratory Failure - Mucous plugging - Resolved   - Aggressive Pulm hygiene regimen - mucomyst, albuterol, chest PT  - LTVV VC - 6-8cc/kg IDBW - TCT daily  - Wean FiO2 - Maintain Oxygen Sat >92%  - VAP Bundle  - HOB > 30o    - PEEP Titrate      Pulmonary embolism  -Eliquis    #Renal  Hypernatremia - Resolved  - FWF    #GI  Bowel regimn    #ID   Bronchitis - Pseudomonas/Serratia  - Cefepime - 4/5 days  - Follow up infectious workup      #DVT/GI ppx  Eliquis    #Lines/Tubes/Drains:   Invasive Devices       Central Venous Catheter Line  Duration             Port A Cath 23 Right Subclavian 648 days              Peripheral Intravenous Line  Duration             Peripheral IV 24 Proximal;Right;Ventral (anterior) Forearm 1 day              Drain  Duration             Gastrostomy/Enterostomy Percutaneous Interventional Gastrostomy 18 Fr.  days    External Urinary Catheter Small <1 day              Airway  Duration             Surgical Airway Shiley Distal 51 days                    #Nutrition:   Diet  Enteral/Parenteral; Tube Feeding No Oral Diet; Jevity 1.5; Continuous; 70; 150; Water; Every 4 hours        #Code Status:   Level 1 - Full Code    #Dispo:   ICU        John Sutherland MD  Pulmonary, Critical Care    Critical care time, excluding procedures, teaching, family meetings, and excludes any prior time recorded by the AP/resident, 35 minutes. Upon my evaluation, this patient has a high probability of imminent or life-threatening deterioration due to above problems which required my direct attention, intervention, and personal management.   Impression/Active Problems:     HTN   HFpEF   DM   Neuromuscualr disorder   Tracheostomy   PEG   Hypernatremia   Aspiration   Vaso vagal    Bradycardia  AMS  Bronchitis       Physical Exam:     Vital Signs:   Weight: 100 kg (221 lb 5.5 oz)  IBW: Ideal body weight: 86.8 kg (191 lb 5.7 oz)  Adjusted ideal body weight: 92.2 kg (203 lb 5.6 oz)  Temp:  [96 °F (35.6 °C)-97.7 °F (36.5 °C)] 96 °F (35.6 °C)  HR:  [] 119  BP: ()/(45-88) 130/80  Resp:  [12-38] 19  SpO2:  [90 %-99 %] 93 %  O2 Device: Ventilator  FiO2 (%):  [35-50] 35  Physical Exam: Unchanged  General: NAD  Neuro: Nodding appropriately   Heart: RRR  Lungs: Non labored  Abdomen: Soft  Extremities: Min edema                Ventilator Settings:    FiO2 (%):  [35-50] 35    Results from last 7 days   Lab Units 12/15/24  2350   PO2 SYLVESTER mm Hg 58.8*     Radiologic Images Reviewed:    CT C/A/P  1.  Complete collapse of the left lower lobe and severe atelectasis of the right lower lobe similar to prior examination. Superimposed aspiration or pneumonia cannot be excluded. Trace left pleural effusion.   2.  2 mm calculus in the distal right ureter just prior to the ureterovesicular junction. No significant hydronephrosis.   3.  Mild thickening of the urinary bladder wall, correlate with urinalysis for cystitis.     CT head  No acute intracranial hemorrhage, midline shift, or mass effect.   Input / Output:  "    Intake/Output Summary (Last 24 hours) at 12/21/2024 0846  Last data filed at 12/21/2024 0536  Gross per 24 hour   Intake 3236 ml   Output 600 ml   Net 2636 ml            Infusions:  dexmedetomidine, 0.1-0.7 mcg/kg/hr, Last Rate: 0.4 mcg/kg/hr (12/21/24 0819)        Scheduled Medications:  Current Facility-Administered Medications   Medication Dose Route Frequency Provider Last Rate    acetaminophen  1,000 mg Intravenous Q8H PRN Julissa Spironello V, CRNP      acetylcysteine  3 mL Nebulization Q6H Julissa Spironello V, CRNP      apixaban  5 mg Per PEG Tube BID AMBER Dean      cefepime  2,000 mg Intravenous Q12H Julissa Spironello V, CRNP Stopped (12/21/24 0254)    chlorhexidine  15 mL Mouth/Throat Q12H ISAEL Clarke, MINENP      dexmedetomidine  0.1-0.7 mcg/kg/hr Intravenous Titrated MINE DeanNP 0.4 mcg/kg/hr (12/21/24 0819)    ipratropium-albuterol  3 mL Nebulization Q6H Julissa Spironello V, CRNP      QUEtiapine  25 mg Oral BID MINE DeanNP         PRN Medications:    acetaminophen    Labs Reviewed:  Results from last 7 days   Lab Units 12/19/24  0546 12/18/24  0201 12/17/24  0441   WBC Thousand/uL 8.54 7.80 11.52*   HEMOGLOBIN g/dL 11.8* 10.5* 11.0*   HEMATOCRIT % 39.2 34.7* 36.0*   PLATELETS Thousands/uL 200 186 206      Results from last 7 days   Lab Units 12/20/24  0522 12/19/24  0545 12/18/24  0201 12/17/24  0441   SODIUM mmol/L 144 144 150* 153*   CO2 mmol/L 26 28 26 30   BUN mg/dL 14 13 14 17   CALCIUM mg/dL 8.7 8.8 8.7 8.9   MAGNESIUM mg/dL  --  2.2 2.3 2.4   PHOSPHORUS mg/dL  --  4.0 3.3 2.2*         Invalid input(s): \"ASTSGOT\", \"ALTSGPT\"LABRCNTIP@ ,alkphos:3,tbilirubin:3,dbilirubin:3)@  Results from last 7 days   Lab Units 12/15/24  2343   INR  1.19           Invalid input(s): \"TROPT\", \"PBNP\"             I have personally seen and examined the patient on (12/21/24 between [id number]). I discussed the patient with the AP/resident " including, but not limited to, verifying findings; reviewing labs and x-rays; discussing with consultants; developing the plan of care with the bedside nurse; and discussing treatment plan with patient or surrogate.  I have reviewed the note and assessment performed by the AP/resident and agree with the AP/resident’s documented findings and plan of care with the above additions/exceptions. Please see my comments for details and adjustments.

## 2024-12-21 NOTE — QUICK NOTE
Patient's sister and caretaker, Dmitry, was called and updated on Hemanth's ICU course including episodes of agitation that leads to ventilator dyssynchrony and hypoxia. She is aware that we started Hemanth on seroquel to help with the agitation for now and have consulted psychiatry to evaluate. She is appreciative of the psychiatry consult as she feels Hemanth may be struggling with depression given his condition. All questions answered to her satisfaction.

## 2024-12-21 NOTE — ASSESSMENT & PLAN NOTE
12/16 CT chest revealing complete collapse of the left lower lobe and severe atelectasis of the right lower lobe similar to prior examination. Superimposed aspiration or pneumonia cannot be excluded. Trace left pleural effusion.   12/16 Sputum culture + Pseudomonas and Serratia- sensitivities pending    Plan  Cefepime D4 of 5  Aggressive chest PT, mucomyst/albuterol  Rest of plan as outlined below

## 2024-12-22 PROBLEM — E87.6 HYPOKALEMIA: Status: RESOLVED | Noted: 2023-07-28 | Resolved: 2024-12-22

## 2024-12-22 PROBLEM — R31.9 HEMATURIA: Status: RESOLVED | Noted: 2024-12-16 | Resolved: 2024-12-22

## 2024-12-22 LAB
ANION GAP SERPL CALCULATED.3IONS-SCNC: 4 MMOL/L (ref 4–13)
BUN SERPL-MCNC: 14 MG/DL (ref 5–25)
CALCIUM SERPL-MCNC: 8.7 MG/DL (ref 8.4–10.2)
CHLORIDE SERPL-SCNC: 104 MMOL/L (ref 96–108)
CO2 SERPL-SCNC: 30 MMOL/L (ref 21–32)
CREAT SERPL-MCNC: 0.4 MG/DL (ref 0.6–1.3)
ERYTHROCYTE [DISTWIDTH] IN BLOOD BY AUTOMATED COUNT: 15.2 % (ref 11.6–15.1)
GFR SERPL CREATININE-BSD FRML MDRD: 151 ML/MIN/1.73SQ M
GLUCOSE SERPL-MCNC: 114 MG/DL (ref 65–140)
HCT VFR BLD AUTO: 33.3 % (ref 36.5–49.3)
HGB BLD-MCNC: 10.4 G/DL (ref 12–17)
MAGNESIUM SERPL-MCNC: 2 MG/DL (ref 1.9–2.7)
MCH RBC QN AUTO: 30.1 PG (ref 26.8–34.3)
MCHC RBC AUTO-ENTMCNC: 31.2 G/DL (ref 31.4–37.4)
MCV RBC AUTO: 97 FL (ref 82–98)
PLATELET # BLD AUTO: 174 THOUSANDS/UL (ref 149–390)
PMV BLD AUTO: 10.7 FL (ref 8.9–12.7)
POTASSIUM SERPL-SCNC: 4 MMOL/L (ref 3.5–5.3)
RBC # BLD AUTO: 3.45 MILLION/UL (ref 3.88–5.62)
SODIUM SERPL-SCNC: 138 MMOL/L (ref 135–147)
WBC # BLD AUTO: 10.16 THOUSAND/UL (ref 4.31–10.16)

## 2024-12-22 PROCEDURE — 80048 BASIC METABOLIC PNL TOTAL CA: CPT | Performed by: NURSE PRACTITIONER

## 2024-12-22 PROCEDURE — 83735 ASSAY OF MAGNESIUM: CPT | Performed by: NURSE PRACTITIONER

## 2024-12-22 PROCEDURE — 94150 VITAL CAPACITY TEST: CPT

## 2024-12-22 PROCEDURE — 94668 MNPJ CHEST WALL SBSQ: CPT

## 2024-12-22 PROCEDURE — 94003 VENT MGMT INPAT SUBQ DAY: CPT

## 2024-12-22 PROCEDURE — NC001 PR NO CHARGE: Performed by: STUDENT IN AN ORGANIZED HEALTH CARE EDUCATION/TRAINING PROGRAM

## 2024-12-22 PROCEDURE — 94664 DEMO&/EVAL PT USE INHALER: CPT

## 2024-12-22 PROCEDURE — 94640 AIRWAY INHALATION TREATMENT: CPT

## 2024-12-22 PROCEDURE — 92610 EVALUATE SWALLOWING FUNCTION: CPT

## 2024-12-22 PROCEDURE — 85027 COMPLETE CBC AUTOMATED: CPT | Performed by: NURSE PRACTITIONER

## 2024-12-22 PROCEDURE — 94669 MECHANICAL CHEST WALL OSCILL: CPT

## 2024-12-22 PROCEDURE — 94760 N-INVAS EAR/PLS OXIMETRY 1: CPT

## 2024-12-22 PROCEDURE — 99232 SBSQ HOSP IP/OBS MODERATE 35: CPT | Performed by: STUDENT IN AN ORGANIZED HEALTH CARE EDUCATION/TRAINING PROGRAM

## 2024-12-22 RX ORDER — LORAZEPAM 2 MG/ML
2 INJECTION INTRAMUSCULAR ONCE
Status: COMPLETED | OUTPATIENT
Start: 2024-12-22 | End: 2024-12-22

## 2024-12-22 RX ADMIN — CHLORHEXIDINE GLUCONATE 15 ML: 1.2 RINSE ORAL at 20:50

## 2024-12-22 RX ADMIN — CEFEPIME HYDROCHLORIDE 2000 MG: 2 INJECTION, SOLUTION INTRAVENOUS at 13:44

## 2024-12-22 RX ADMIN — Medication 6 MG: at 21:04

## 2024-12-22 RX ADMIN — ACETYLCYSTEINE 600 MG: 200 SOLUTION ORAL; RESPIRATORY (INHALATION) at 13:22

## 2024-12-22 RX ADMIN — IPRATROPIUM BROMIDE AND ALBUTEROL SULFATE 3 ML: .5; 3 SOLUTION RESPIRATORY (INHALATION) at 13:22

## 2024-12-22 RX ADMIN — QUETIAPINE FUMARATE 25 MG: 25 TABLET ORAL at 17:37

## 2024-12-22 RX ADMIN — CHLORHEXIDINE GLUCONATE 15 ML: 1.2 RINSE ORAL at 08:02

## 2024-12-22 RX ADMIN — IPRATROPIUM BROMIDE AND ALBUTEROL SULFATE 3 ML: .5; 3 SOLUTION RESPIRATORY (INHALATION) at 19:04

## 2024-12-22 RX ADMIN — IPRATROPIUM BROMIDE AND ALBUTEROL SULFATE 3 ML: .5; 3 SOLUTION RESPIRATORY (INHALATION) at 07:15

## 2024-12-22 RX ADMIN — LORAZEPAM 2 MG: 2 INJECTION INTRAMUSCULAR; INTRAVENOUS at 14:38

## 2024-12-22 RX ADMIN — QUETIAPINE FUMARATE 25 MG: 25 TABLET ORAL at 08:02

## 2024-12-22 RX ADMIN — DEXMEDETOMIDINE HYDROCHLORIDE 0.4 MCG/KG/HR: 400 INJECTION INTRAVENOUS at 17:52

## 2024-12-22 RX ADMIN — IPRATROPIUM BROMIDE AND ALBUTEROL SULFATE 3 ML: .5; 3 SOLUTION RESPIRATORY (INHALATION) at 01:00

## 2024-12-22 RX ADMIN — ACETYLCYSTEINE 600 MG: 200 SOLUTION ORAL; RESPIRATORY (INHALATION) at 07:14

## 2024-12-22 RX ADMIN — APIXABAN 5 MG: 5 TABLET, FILM COATED ORAL at 17:37

## 2024-12-22 RX ADMIN — ACETYLCYSTEINE 600 MG: 200 SOLUTION ORAL; RESPIRATORY (INHALATION) at 19:04

## 2024-12-22 RX ADMIN — APIXABAN 5 MG: 5 TABLET, FILM COATED ORAL at 08:02

## 2024-12-22 RX ADMIN — DEXMEDETOMIDINE HYDROCHLORIDE 0.6 MCG/KG/HR: 400 INJECTION INTRAVENOUS at 07:48

## 2024-12-22 RX ADMIN — CEFEPIME HYDROCHLORIDE 2000 MG: 2 INJECTION, SOLUTION INTRAVENOUS at 01:55

## 2024-12-22 RX ADMIN — ACETYLCYSTEINE 600 MG: 200 SOLUTION ORAL; RESPIRATORY (INHALATION) at 01:00

## 2024-12-22 NOTE — ASSESSMENT & PLAN NOTE
Patient with history of waxing and waning agitation   Required 8 mg IM Versed + 2 mg IV versed at SLE  CT negative  UDS + THC, benzos   MRI during previous admission revealed images consistent with anoxic brain injury  Encephalopathy now resolved with treatment  Hypernatremia now corrected    Plan:  Limit sedatives that can cause bradycardia; as patient has history of vagal/bradycardiac events   Delirium precautions  CAM ICU, fall precautions  Neuro checks per protocol

## 2024-12-22 NOTE — PROGRESS NOTES
Critical Care Attending Note; John Sutherland   Note Date: 24  Note Time: 9:54 AM    Patient: Hemanth Swanson  Age, : 39 y.o., 1985 MRN: [id number] Code Status: Level 1 - Full Code Patient Location: ICU 05/ICU 05   Hospital LOS:6 days  ]   Patient seen and examined, medical record reviewed, discussed with house staff and nursing staff.     HPI   CC: AMS  39M with a PMH Seminoma(Orcetomy/Chemo), HTN, HFpEF, DM, Cardiac Arrest(Hypoxic - ), PE, Demyelinating Neuromuscular Disorder(Trach/Peg), recurrent PEG tube dislodgement who presents due to agitation found to have hypernatremia.       Main ICU Plans:       #Neuro  Demyelinating NM disorder/Agitation - Baseline able to eat, outbursts with rhytmic motion but not responsive to ativan.   - Delirium precautions - Psych consult  - EEG  - Seroquel   - PRN Ativan    #Card  Bradycardia - likely secondary to vagaling   - Tele     #Pulm  Respiratory Failure - Mucous plugging - Resolved   - Aggressive Pulm hygiene regimen - mucomyst, albuterol, chest PT  - LTVV VC - 6-8cc/kg IDBW - TCT daily  - Wean FiO2 - Maintain Oxygen Sat >92%  - VAP Bundle  - HOB > 30o    - PEEP Titrate      Pulmonary embolism  -Eliquis    #Renal  Hypernatremia - Resolved  - FWF    #GI  Bowel regimn    #ID   Bronchitis - Pseudomonas/Serratia  - Cefepime - 5/5 days  - Follow up infectious workup      #DVT/GI ppx  Eliquis    #Lines/Tubes/Drains:   Invasive Devices       Central Venous Catheter Line  Duration             Port A Cath 23 Right Subclavian 649 days              Peripheral Intravenous Line  Duration             Peripheral IV 24 Proximal;Right;Ventral (anterior) Forearm 2 days    Peripheral IV 24 Dorsal (posterior);Right Forearm <1 day              Drain  Duration             Gastrostomy/Enterostomy Percutaneous Interventional Gastrostomy 18 Fr.  days    External Urinary Catheter Small 1 day              Airway  Duration             Surgical Airway  Jie Distal 52 days                    #Nutrition:   Diet Enteral/Parenteral; Tube Feeding No Oral Diet; Jevity 1.5; Continuous; 70; 150; Water; Every 4 hours        #Code Status:   Level 1 - Full Code    #Dispo:   ICU        John Sutherland MD  Pulmonary, Critical Care    Critical care time, excluding procedures, teaching, family meetings, and excludes any prior time recorded by the AP/resident, 35 minutes. Upon my evaluation, this patient has a high probability of imminent or life-threatening deterioration due to above problems which required my direct attention, intervention, and personal management.   Impression/Active Problems:     HTN   HFpEF   DM   Neuromuscualr disorder   Tracheostomy   PEG   Hypernatremia   Aspiration   Vaso vagal    Bradycardia  AMS  Bronchitis       Physical Exam:     Vital Signs:   Weight: 98.7 kg (217 lb 9.5 oz)  IBW: Ideal body weight: 86.8 kg (191 lb 5.7 oz)  Adjusted ideal body weight: 91.6 kg (201 lb 13.7 oz)  Temp:  [96.9 °F (36.1 °C)-98 °F (36.7 °C)] 97.9 °F (36.6 °C)  HR:  [60-82] 71  BP: ()/(52-80) 120/71  Resp:  [14-20] 15  SpO2:  [91 %-98 %] 95 %  O2 Device: Ventilator  FiO2 (%):  [35] 35  Physical Exam: Unchanged  General: NAD  Neuro: Nodding appropriately   Heart: RRR  Lungs: Non labored  Abdomen: Soft  Extremities: Min edema                Ventilator Settings:    FiO2 (%):  [35] 35    Results from last 7 days   Lab Units 12/15/24  2350   PO2 SYLVESTER mm Hg 58.8*     Radiologic Images Reviewed:    CT C/A/P  1.  Complete collapse of the left lower lobe and severe atelectasis of the right lower lobe similar to prior examination. Superimposed aspiration or pneumonia cannot be excluded. Trace left pleural effusion.   2.  2 mm calculus in the distal right ureter just prior to the ureterovesicular junction. No significant hydronephrosis.   3.  Mild thickening of the urinary bladder wall, correlate with urinalysis for cystitis.     CT head  No acute intracranial hemorrhage,  "midline shift, or mass effect.   Input / Output:     Intake/Output Summary (Last 24 hours) at 12/22/2024 0954  Last data filed at 12/22/2024 0810  Gross per 24 hour   Intake 1425.35 ml   Output 1040 ml   Net 385.35 ml            Infusions:  dexmedetomidine, 0.1-0.7 mcg/kg/hr, Last Rate: 0.4 mcg/kg/hr (12/22/24 0810)        Scheduled Medications:  Current Facility-Administered Medications   Medication Dose Route Frequency Provider Last Rate    acetaminophen  1,000 mg Intravenous Q8H PRN Julissa Spironello V, CRNP      acetylcysteine  3 mL Nebulization Q6H Julissa Spironello V, CRNP      apixaban  5 mg Per PEG Tube BID Carol Ying, MINENP      cefepime  2,000 mg Intravenous Q12H Julissa Spironello V, CRNP Stopped (12/22/24 0225)    chlorhexidine  15 mL Mouth/Throat Q12H ISAEL Clarke, MINENP      dexmedetomidine  0.1-0.7 mcg/kg/hr Intravenous Titrated Carol Ying, CRNP 0.4 mcg/kg/hr (12/22/24 0810)    ipratropium-albuterol  3 mL Nebulization Q6H Julissa Spironello V, CRNP      melatonin  6 mg Oral HS Carol Ying, CRNP      QUEtiapine  25 mg Oral BID Carol Ying, CRNP         PRN Medications:    acetaminophen    Labs Reviewed:  Results from last 7 days   Lab Units 12/22/24  0504 12/21/24  1012 12/19/24  0546   WBC Thousand/uL 10.16 13.29* 8.54   HEMOGLOBIN g/dL 10.4* 10.0* 11.8*   HEMATOCRIT % 33.3* 31.9* 39.2   PLATELETS Thousands/uL 174 186 200      Results from last 7 days   Lab Units 12/22/24  0504 12/21/24  1012 12/20/24  0522 12/19/24  0545 12/18/24  0201 12/17/24  0441   SODIUM mmol/L 138 143 144 144 150* 153*   CO2 mmol/L 30 32 26 28 26 30   BUN mg/dL 14 13 14 13 14 17   CALCIUM mg/dL 8.7 8.7 8.7 8.8 8.7 8.9   MAGNESIUM mg/dL 2.0 2.1  --  2.2 2.3 2.4   PHOSPHORUS mg/dL  --   --   --  4.0 3.3 2.2*         Invalid input(s): \"ASTSGOT\", \"ALTSGPT\"LABRCNTIP@ ,alkphos:3,tbilirubin:3,dbilirubin:3)@  Results from last 7 days   Lab Units 12/15/24  7756 " "  INR  1.19           Invalid input(s): \"TROPT\", \"PBNP\"             I have personally seen and examined the patient on (12/22/24 between 3445-5456). I discussed the patient with the AP/resident including, but not limited to, verifying findings; reviewing labs and x-rays; discussing with consultants; developing the plan of care with the bedside nurse; and discussing treatment plan with patient or surrogate.  I have reviewed the note and assessment performed by the AP/resident and agree with the AP/resident’s documented findings and plan of care with the above additions/exceptions. Please see my comments for details and adjustments.                 "

## 2024-12-22 NOTE — ASSESSMENT & PLAN NOTE
Tachypnea, leukocytosis. HD stable. Lactic 3.1-> 1  ED course: 1L Isolyte, Zosyn, Vanco  Source: Pulmonary  CT chest: complete collapse of the left lower lobe and severe atelectasis of the right lower lobe similar to prior examination. Superimposed aspiration or pneumonia cannot be excluded. Trace left pleural effusion   Sputum culture: pseudomonas and serratia- sensitivities pending  1 of 2 BC's 12/16 + staph epi- likely contaminant  HD stable    Plan  Treatment for pneumonia as above  Trend CBC, temperature curve

## 2024-12-22 NOTE — PLAN OF CARE
Problem: RESPIRATORY - ADULT  Goal: Achieves optimal ventilation and oxygenation  Description: INTERVENTIONS:  - Assess for changes in respiratory status  - Assess for changes in mentation and behavior  - Position to facilitate oxygenation and minimize respiratory effort  - Oxygen administered by appropriate delivery if ordered  - Initiate smoking cessation education as indicated  - Encourage broncho-pulmonary hygiene including cough, deep breathe, Incentive Spirometry  - Assess the need for suctioning and aspirate as needed  - Assess and instruct to report SOB or any respiratory difficulty  - Respiratory Therapy support as indicated  Outcome: Progressing     Problem: Prexisting or High Potential for Compromised Skin Integrity  Goal: Skin integrity is maintained or improved  Description: INTERVENTIONS:  - Identify patients at risk for skin breakdown  - Assess and monitor skin integrity  - Assess and monitor nutrition and hydration status  - Monitor labs   - Assess for incontinence   - Turn and reposition patient  - Assist with mobility/ambulation  - Relieve pressure over bony prominences  - Avoid friction and shearing  - Provide appropriate hygiene as needed including keeping skin clean and dry  - Evaluate need for skin moisturizer/barrier cream  - Collaborate with interdisciplinary team   - Patient/family teaching  - Consider wound care consult   Outcome: Progressing     Problem: PAIN - ADULT  Goal: Verbalizes/displays adequate comfort level or baseline comfort level  Description: Interventions:  - Encourage patient to monitor pain and request assistance  - Assess pain using appropriate pain scale  - Administer analgesics based on type and severity of pain and evaluate response  - Implement non-pharmacological measures as appropriate and evaluate response  - Consider cultural and social influences on pain and pain management  - Notify physician/advanced practitioner if interventions unsuccessful or patient  reports new pain  Outcome: Progressing     Problem: INFECTION - ADULT  Goal: Absence or prevention of progression during hospitalization  Description: INTERVENTIONS:  - Assess and monitor for signs and symptoms of infection  - Monitor lab/diagnostic results  - Monitor all insertion sites, i.e. indwelling lines, tubes, and drains  - Monitor endotracheal if appropriate and nasal secretions for changes in amount and color  - [place] appropriate cooling/warming therapies per order  - Administer medications as ordered  - Instruct and encourage patient and family to use good hand hygiene technique  - Identify and instruct in appropriate isolation precautions for identified infection/condition  Outcome: Progressing  Goal: Absence of fever/infection during neutropenic period  Description: INTERVENTIONS:  - Monitor WBC    Outcome: Progressing     Problem: SAFETY ADULT  Goal: Patient will remain free of falls  Description: INTERVENTIONS:  - Educate patient/family on patient safety including physical limitations  - Instruct patient to call for assistance with activity   - Consult OT/PT to assist with strengthening/mobility   - Keep Call bell within reach  - Keep bed low and locked with side rails adjusted as appropriate  - Keep care items and personal belongings within reach  - Initiate and maintain comfort rounds  - Make Fall Risk Sign visible to staff  - Offer Toileting every 2 Hours, in advance of need  - Initiate/Maintain bed alarm  - Obtain necessary fall risk management equipment: yellow socks  - Apply yellow socks and bracelet for high fall risk patients  - Consider moving patient to room near nurses station  Outcome: Progressing  Goal: Maintain or return to baseline ADL function  Description: INTERVENTIONS:  -  Assess patient's ability to carry out ADLs; assess patient's baseline for ADL function and identify physical deficits which impact ability to perform ADLs (bathing, care of mouth/teeth, toileting, grooming,  dressing, etc.)  - Assess/evaluate cause of self-care deficits   - Assess range of motion  - Assess patient's mobility; develop plan if impaired  - Assess patient's need for assistive devices and provide as appropriate  - Encourage maximum independence but intervene and supervise when necessary  - Involve family in performance of ADLs  - Assess for home care needs following discharge   - Consider OT consult to assist with ADL evaluation and planning for discharge  - Provide patient education as appropriate  Outcome: Progressing  Goal: Maintains/Returns to pre admission functional level  Description: INTERVENTIONS:  - Perform AM-PAC 6 Click Basic Mobility/ Daily Activity assessment daily.  - Set and communicate daily mobility goal to care team and patient/family/caregiver.   - Collaborate with rehabilitation services on mobility goals if consulted  - Perform Range of Motion 4 times a day.  - Reposition patient every 2 hours.  - Dangle patient 3 times a day  - Stand patient 3 times a day  - Ambulate patient 3 times a day  - Out of bed to chair 3 times a day   - Out of bed for meals 3 times a day  - Out of bed for toileting  - Record patient progress and toleration of activity level   Outcome: Progressing     Problem: DISCHARGE PLANNING  Goal: Discharge to home or other facility with appropriate resources  Description: INTERVENTIONS:  - Identify barriers to discharge w/patient and caregiver  - Arrange for needed discharge resources and transportation as appropriate  - Identify discharge learning needs (meds, wound care, etc.)  - Arrange for interpretive services to assist at discharge as needed  - Refer to Case Management Department for coordinating discharge planning if the patient needs post-hospital services based on physician/advanced practitioner order or complex needs related to functional status, cognitive ability, or social support system  Outcome: Progressing     Problem: Knowledge Deficit  Goal:  Patient/family/caregiver demonstrates understanding of disease process, treatment plan, medications, and discharge instructions  Description: Complete learning assessment and assess knowledge base.  Interventions:  - Provide teaching at level of understanding  - Provide teaching via preferred learning methods  Outcome: Progressing     Problem: Nutrition/Hydration-ADULT  Goal: Nutrient/Hydration intake appropriate for improving, restoring or maintaining nutritional needs  Description: Monitor and assess patient's nutrition/hydration status for malnutrition. Collaborate with interdisciplinary team and initiate plan and interventions as ordered.  Monitor patient's weight and dietary intake as ordered or per policy. Utilize nutrition screening tool and intervene as necessary. Determine patient's food preferences and provide high-protein, high-caloric foods as appropriate.     INTERVENTIONS:  - Monitor oral intake, urinary output, labs, and treatment plans  - Assess nutrition and hydration status and recommend course of action  - Evaluate amount of meals eaten  - Assist patient with eating if necessary   - Allow adequate time for meals  - Recommend/ encourage appropriate diets, oral nutritional supplements, and vitamin/mineral supplements  - Order, calculate, and assess calorie counts as needed  - Recommend, monitor, and adjust tube feedings and TPN/PPN based on assessed needs  - Assess need for intravenous fluids  - Provide specific nutrition/hydration education as appropriate  - Include patient/family/caregiver in decisions related to nutrition  Outcome: Progressing     Problem: SAFETY,RESTRAINT: NV/NON-SELF DESTRUCTIVE BEHAVIOR  Goal: Remains free of harm/injury (restraint for non violent/non self-detsructive behavior)  Description: INTERVENTIONS:  - Instruct patient/family regarding restraint use   - Assess and monitor physiologic and psychological status   - Provide interventions and comfort measures to meet  assessed patient needs   - Identify and implement measures to help patient regain control  - Assess readiness for release of restraint   Outcome: Progressing  Goal: Returns to optimal restraint-free functioning  Description: INTERVENTIONS:  - Assess the patient's behavior and symptoms that indicate continued need for restraint  - Identify and implement measures to help patient regain control  - Assess readiness for release of restraint   Outcome: Progressing

## 2024-12-22 NOTE — SPEECH THERAPY NOTE
SLP Swallow Evaluation (process initiated)      Patient Name: Hemanth Swanson    Today's Date: 12/22/2024     Problem List  Principal Problem:    Pneumonia due to Pseudomonas and Serratia  Active Problems:    Umbilical hernia without obstruction and without gangrene    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HCC)    Hypokalemia    Acute on chronic respiratory failure with hypoxia and hypercapnia (HCC)    Sepsis (HCC)    S/P percutaneous endoscopic gastrostomy (PEG) tube placement (HCC)    Mixed axonal-demyelinating polyneuropathy    Toxic metabolic encephalopathy    History of pulmonary embolus (PE)    Hematuria    Agitation      Past Medical History  Past Medical History:   Diagnosis Date    Anxiety     Cancer (HCC)     Depression     Hypernatremia 10/06/2024    Migration of percutaneous endoscopic gastrostomy (PEG) tube (formerly [facility]) 09/24/2023    Psychiatric disorder     bipolar       Past Surgical History  Past Surgical History:   Procedure Laterality Date    IR BIOPSY LYMPH NODE  01/20/2023    IR GASTROSTOMY (G) TUBE CHECK/CHANGE/REINSERTION/UPSIZE  6/18/2024    IR GASTROSTOMY TUBE PLACEMENT  09/25/2023    IR LUMBAR PUNCTURE  09/06/2023    IR LUMBAR PUNCTURE  10/25/2024    IR PORT PLACEMENT  03/14/2023    ORCHIECTOMY Left 12/14/2022    Procedure: ORCHIECTOMY INGUINAL;  Surgeon: Flip Michael MD;  Location:  MAIN OR;  Service: Urology    PEG W/TRACHEOSTOMY PLACEMENT N/A 09/08/2023    Procedure: TRACHEOSTOMY WITH INSERTION PEG TUBE;  Surgeon: Rudy Mcmanus MD;  Location: [facility] OR;  Service: General    TESTICLE SURGERY         Summary   Pt presented as ventilator dependent with s/s impaired cognition/memory. He stated no desire for oral intake today.  I discussed my recommendation for Video/Modified Barium Swallow if food/liquid desired. (?comprehension).     I spoke with his sister by phone sister by phone.  She reported awareness of his impaired memory.  She provided history regarding nutrition.  She reported the Royce  was on Jevity 1.5 70 cc hourly/continuous feeding with flushes that were increased from 60 to 100 cc every 4 hours.  She reported that she provided some puréed foods as well as thin liquids at home and she denied overt symptoms of aspiration.  She recalled that video barium swallow was recommended to be completed now that patient is ventilator dependent (3 previous videos completed in the second half of 2023 while patient on trach collar/only on nocturnal use of vent at that time.    Recommendation: Consider order for video barium swallow if/when patient expresses interest in oral intake  Recommended Form of Meds: Via PEG tube  Other Recommendations: Continue frequent oral care         Current Medical Status  Hemanth Swanson is a 39 y.o. M c PMH of neuromuscular disease status post trach and PEG, chronic respiratory failure, HFpEF, diabetes, hypertension and history of testicular cancer status post resection and chemo.  He presented to [facility] for increased agitation and concerns for infection.  He was transferred to [facility] 12/16.   DX: Acute on chronic respiratory failure with hypoxia and hypercapnia (6XL trach in place), SIRS, toxic metabolic encephalopathy, hypernatremia, seminoma of left testis, history of PE and PEG tube placement.   CT chest revealing Complete collapse of the left lower lobe and severe atelectasis of the right lower lobe similar to prior examination. Superimposed aspiration or pneumonia cannot be excluded. Trace left pleural effusion.  Family reportedly presented with question of the option of oral intake to supplement PEG tube feedings.  SLP swallowing evaluation ordered at this time.    Current Precautions:  Fall, Delirium    Allergies:  No known food allergies    Past medical history:  Please see H&P for details    Social/Education/Vocational Hx:  Pt lives with and is cared for by his sister.    Swallow Information   Current Risks for Dysphagia & Aspiration: AMS, ventilator  "dependency/ #6 XL trach in place  Current Diet: Tube feeding only at this time  Baseline Diet: Tube feeding plus puréed food and thin liquid      Baseline Assessment   Behavior/Cognition: alert and decreased attention  Speech/Language Status: able to follow commands inconsistently, minimum effort when attempting to \"mouth\" words  Patient Positioning: upright in bed  Pain Status/Interventions/Response to Interventions:  No report of or nonverbal indications of pain.       Swallow Mechanism Exam  Facial: symmetrical  Labial: decreased strength  Lingual: decreased strength/decreased effort  Velum: unable to visualize  Mandible:  decreased ROM  Vocal quality: Not able to \"speak over the vent\" today but sister reports he can and does at home  Volitional Cough: weak   Respiratory Status: On full vent support   Tracheostomy: #6 cuffed XLT      Consistencies Assessed and Performance   Consistencies Administered: none (patient declined)    Summary and Recommendations (see above)    Dysphagia LTG  -Patient will demonstrate safe and effective oral intake (without overt s/s significant oral/pharyngeal dysphagia including s/s penetration or aspiration) for the highest appropriate diet level.     Short Term Goals:  --Patient will comply with a Video/Modified Barium Swallow study for more complete assessment of swallowing anatomy/physiology/aspiration risk and to best guide tx plan    Ruth Escobar MS CCC-SLP  [place] License [id number]      "

## 2024-12-22 NOTE — PROGRESS NOTES
Progress Note - Critical Care/ICU   Name: Hemanth Swanson 39 y.o. male I MRN: [id number]  Unit/Bed#: ICU 05 I Date of Admission: 12/16/2024   Date of Service: 12/22/2024 I Hospital Day: 6      Assessment & Plan  Acute on chronic respiratory failure with hypoxia and hypercapnia (HCC)  S/p trach for NM disease.   Baseline Vent dependent  Trach 6XL  History of significant mucous plugs  CT chest revealing complete collapse of the left lower lobe and severe atelectasis of the right lower lobe similar to prior examination. Superimposed aspiration or pneumonia cannot be excluded. Trace left pleural effusion.     Plan:  Continue ACVC 14/500/40/6+ (home settings)  Aggressive pulmonary hygiene   Mucomyst, albuterol  Chest therapy  Frequent suctioning/turning  Antibiotics as above  F/u OP with Pulmonology   Toxic metabolic encephalopathy  Patient with history of waxing and waning agitation   Required 8 mg IM Versed + 2 mg IV versed at SLE  CT negative  UDS + THC, benzos   MRI during previous admission revealed images consistent with anoxic brain injury  Encephalopathy now resolved with treatment  Hypernatremia now corrected    Plan:  Limit sedatives that can cause bradycardia; as patient has history of vagal/bradycardiac events   Delirium precautions  CAM ICU, fall precautions  Neuro checks per protocol   Sepsis (HCC)  Tachypnea, leukocytosis. HD stable. Lactic 3.1-> 1  ED course: 1L Isolyte, Zosyn, Vanco  Source: Pulmonary  CT chest: complete collapse of the left lower lobe and severe atelectasis of the right lower lobe similar to prior examination. Superimposed aspiration or pneumonia cannot be excluded. Trace left pleural effusion   Sputum culture: pseudomonas and serratia- sensitivities pending  1 of 2 BC's 12/16 + staph epi- likely contaminant  HD stable    Plan  Treatment for pneumonia as above  Trend CBC, temperature curve  Pneumonia due to Pseudomonas and Serratia  12/16 CT chest revealing complete collapse of the left  lower lobe and severe atelectasis of the right lower lobe similar to prior examination. Superimposed aspiration or pneumonia cannot be excluded. Trace left pleural effusion.   12/16 Sputum culture + Pseudomonas and Serratia    Plan  Cefepime D5 of 5  Aggressive chest PT, mucomyst/albuterol  Rest of plan as outlined     Agitation  Family reports that Hemanth has had episodes of agitation at home   While hospitalized, he has had multiple episodes of the same. During agitation events, he holds his breath and desaturates. He also tries to hit and kick staff so now he is restrained for safety. He denies pain, respiratory distress during these episodes. Minimal secretions suctioned. No clear prompting events lead to agitation  Seroquel BID started on 12/21  Psychiatry consulted  Mixed axonal-demyelinating polyneuropathy  Trach and PEG in place  PT/OT  S/P percutaneous endoscopic gastrostomy (PEG) tube placement (HCC)  PEG tube re-placed 12/10 after self dislodgment  Continue TF and FWF  History of pulmonary embolus (PE)  Continue Eliquis   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HCC)  S/p resection and chemo  Hypokalemia  Suspect a component of malnutrition  Replete as needed  Maintain K>4  Hematuria  Possibly secondary to trauma  Resolved after holding Eliquis  Eliquis resumed 12/19  Monitor   Umbilical hernia without obstruction and without gangrene  Chronic, deemed not a surgical candidate  Disposition: Critical care    ICU Core Measures     Vented Patient  VAP Bundle  VAP bundle ordered     A: Assess, Prevent, and Manage Pain Has pain been assessed? Yes  Need for changes to pain regimen? No   B: Both Spontaneous Awakening Trials (SATs) and Spontaneous Breathing Trials (SBTs) Plan to perform spontaneous awakening trial today? Chronic vent   Plan to perform spontaneous breathing trial today? Chronic vent   Obvious barriers to extubation? Yes   C: Choice of Sedation RASS Goal: 0 Alert and Calm  Need for changes to sedation or analgesia  regimen? No   D: Delirium CAM-ICU: Unable to perform secondary to Dementia or other chronic cognitive dysfunction   E: Early Mobility  Plan for early mobility? NA   F: Family Engagement Plan for family engagement today? Yes       Antibiotic Review: Patient on appropriate coverage based on culture data.     Review of Invasive Devices:      Central access plan:  Port not accessed      Prophylaxis:  VTE VTE covered by:  apixaban, Per PEG Tube, 5 mg at 12/21/24 1711       Stress Ulcer  not ordered         24 Hour Events : No significant overnight events. Remains with periods of intermittent agitation    Subjective   Review of Systems: See HPI for Review of Systems    Objective :                   Vitals I/O      Most Recent Min/Max in 24hrs   Temp (!) 96.9 °F (36.1 °C) Temp  Min: 96.9 °F (36.1 °C)  Max: 98 °F (36.7 °C)   Pulse 60 Pulse  Min: 60  Max: 119   Resp 15 Resp  Min: 14  Max: 19   /75 BP  Min: 92/52  Max: 130/80   O2 Sat 97 % SpO2  Min: 91 %  Max: 98 %      Intake/Output Summary (Last 24 hours) at 12/22/2024 0727  Last data filed at 12/22/2024 0225  Gross per 24 hour   Intake 1332.23 ml   Output 315 ml   Net 1017.23 ml       Diet Enteral/Parenteral; Tube Feeding No Oral Diet; Jevity 1.5; Continuous; 70; 150; Water; Every 4 hours    Invasive Monitoring           Physical Exam   Physical Exam  Vitals reviewed.   Skin:     General: Skin is warm and dry.      Coloration: Skin is pale.   HENT:      Mouth/Throat:      Mouth: Mucous membranes are moist.   Cardiovascular:      Rate and Rhythm: Normal rate and regular rhythm.      Pulses: Normal pulses.   Musculoskeletal:      Right lower leg: No edema.      Left lower leg: No edema.   Abdominal:      Palpations: Abdomen is soft.   Constitutional:       Appearance: He is ill-appearing. He is not toxic-appearing.      Interventions: He is restrained.   Pulmonary:      Breath sounds: No wheezing or rales.   Neurological:      Motor: gross motor function is at  baseline for patient.   Genitourinary/Anorectal:  external catheter present.        Diagnostic Studies        Lab Results: I have reviewed the following results:     Medications:  Scheduled PRN   acetylcysteine, 3 mL, Q6H  apixaban, 5 mg, BID  cefepime, 2,000 mg, Q12H  chlorhexidine, 15 mL, Q12H ISAEL  ipratropium-albuterol, 3 mL, Q6H  melatonin, 6 mg, HS  QUEtiapine, 25 mg, BID      acetaminophen, 1,000 mg, Q8H PRN       Continuous    dexmedetomidine, 0.1-0.7 mcg/kg/hr, Last Rate: 0.6 mcg/kg/hr (12/22/24 0612)         Labs:   CBC    Recent Labs     12/21/24  1012 12/22/24  0504   WBC 13.29* 10.16   HGB 10.0* 10.4*   HCT 31.9* 33.3*    174     BMP    Recent Labs     12/21/24  1012 12/22/24  0504   SODIUM 143 138   K 3.8 4.0    104   CO2 32 30   AGAP 4 4   BUN 13 14   CREATININE 0.41* 0.40*   CALCIUM 8.7 8.7       Coags    No recent results     Additional Electrolytes  Recent Labs     12/21/24  1012 12/22/24  0504   MG 2.1 2.0          Blood Gas    No recent results  No recent results LFTs  No recent results    Infectious  No recent results  Glucose  Recent Labs     12/21/24  1012 12/22/24  0504   GLUC 102 114

## 2024-12-23 ENCOUNTER — APPOINTMENT (INPATIENT)
Dept: RADIOLOGY | Facility: HOSPITAL | Age: 39
DRG: 137 | End: 2024-12-23
Payer: COMMERCIAL

## 2024-12-23 PROBLEM — F39 MOOD DISORDER (HCC): Status: ACTIVE | Noted: 2024-12-23

## 2024-12-23 PROBLEM — R25.9 ABNORMAL MOVEMENTS: Status: ACTIVE | Noted: 2024-12-23

## 2024-12-23 LAB
ANION GAP SERPL CALCULATED.3IONS-SCNC: 4 MMOL/L (ref 4–13)
BUN SERPL-MCNC: 14 MG/DL (ref 5–25)
CALCIUM SERPL-MCNC: 9 MG/DL (ref 8.4–10.2)
CHLORIDE SERPL-SCNC: 101 MMOL/L (ref 96–108)
CO2 SERPL-SCNC: 32 MMOL/L (ref 21–32)
CREAT SERPL-MCNC: 0.39 MG/DL (ref 0.6–1.3)
ERYTHROCYTE [DISTWIDTH] IN BLOOD BY AUTOMATED COUNT: 15 % (ref 11.6–15.1)
GFR SERPL CREATININE-BSD FRML MDRD: 153 ML/MIN/1.73SQ M
GLUCOSE SERPL-MCNC: 116 MG/DL (ref 65–140)
HCT VFR BLD AUTO: 32.4 % (ref 36.5–49.3)
HGB BLD-MCNC: 10.3 G/DL (ref 12–17)
MAGNESIUM SERPL-MCNC: 2 MG/DL (ref 1.9–2.7)
MCH RBC QN AUTO: 30.3 PG (ref 26.8–34.3)
MCHC RBC AUTO-ENTMCNC: 31.8 G/DL (ref 31.4–37.4)
MCV RBC AUTO: 95 FL (ref 82–98)
PHOSPHATE SERPL-MCNC: 3.6 MG/DL (ref 2.7–4.5)
PLATELET # BLD AUTO: 177 THOUSANDS/UL (ref 149–390)
PMV BLD AUTO: 10.8 FL (ref 8.9–12.7)
POTASSIUM SERPL-SCNC: 4.2 MMOL/L (ref 3.5–5.3)
PROCALCITONIN SERPL-MCNC: 0.11 NG/ML
RBC # BLD AUTO: 3.4 MILLION/UL (ref 3.88–5.62)
SODIUM SERPL-SCNC: 137 MMOL/L (ref 135–147)
WBC # BLD AUTO: 12.19 THOUSAND/UL (ref 4.31–10.16)

## 2024-12-23 PROCEDURE — 84145 PROCALCITONIN (PCT): CPT | Performed by: NURSE PRACTITIONER

## 2024-12-23 PROCEDURE — 93005 ELECTROCARDIOGRAM TRACING: CPT

## 2024-12-23 PROCEDURE — 94669 MECHANICAL CHEST WALL OSCILL: CPT

## 2024-12-23 PROCEDURE — 99232 SBSQ HOSP IP/OBS MODERATE 35: CPT | Performed by: ANESTHESIOLOGY

## 2024-12-23 PROCEDURE — 94668 MNPJ CHEST WALL SBSQ: CPT

## 2024-12-23 PROCEDURE — 94640 AIRWAY INHALATION TREATMENT: CPT

## 2024-12-23 PROCEDURE — 80048 BASIC METABOLIC PNL TOTAL CA: CPT | Performed by: NURSE PRACTITIONER

## 2024-12-23 PROCEDURE — 94150 VITAL CAPACITY TEST: CPT

## 2024-12-23 PROCEDURE — 71045 X-RAY EXAM CHEST 1 VIEW: CPT

## 2024-12-23 PROCEDURE — 83735 ASSAY OF MAGNESIUM: CPT | Performed by: NURSE PRACTITIONER

## 2024-12-23 PROCEDURE — 99255 IP/OBS CONSLTJ NEW/EST HI 80: CPT | Performed by: PSYCHIATRY & NEUROLOGY

## 2024-12-23 PROCEDURE — 99255 IP/OBS CONSLTJ NEW/EST HI 80: CPT | Performed by: STUDENT IN AN ORGANIZED HEALTH CARE EDUCATION/TRAINING PROGRAM

## 2024-12-23 PROCEDURE — 94664 DEMO&/EVAL PT USE INHALER: CPT

## 2024-12-23 PROCEDURE — 85027 COMPLETE CBC AUTOMATED: CPT | Performed by: NURSE PRACTITIONER

## 2024-12-23 PROCEDURE — 94003 VENT MGMT INPAT SUBQ DAY: CPT

## 2024-12-23 PROCEDURE — 94760 N-INVAS EAR/PLS OXIMETRY 1: CPT

## 2024-12-23 PROCEDURE — 84100 ASSAY OF PHOSPHORUS: CPT | Performed by: NURSE PRACTITIONER

## 2024-12-23 RX ORDER — OLANZAPINE 10 MG/1
10 TABLET, ORALLY DISINTEGRATING ORAL ONCE
Status: COMPLETED | OUTPATIENT
Start: 2024-12-23 | End: 2024-12-23

## 2024-12-23 RX ORDER — LORAZEPAM 2 MG/ML
2 INJECTION INTRAMUSCULAR EVERY 4 HOURS PRN
Status: DISCONTINUED | OUTPATIENT
Start: 2024-12-23 | End: 2024-12-24

## 2024-12-23 RX ORDER — QUETIAPINE FUMARATE 25 MG/1
50 TABLET, FILM COATED ORAL 2 TIMES DAILY
Status: DISCONTINUED | OUTPATIENT
Start: 2024-12-23 | End: 2024-12-24

## 2024-12-23 RX ADMIN — ACETYLCYSTEINE 600 MG: 200 SOLUTION ORAL; RESPIRATORY (INHALATION) at 13:22

## 2024-12-23 RX ADMIN — CHLORHEXIDINE GLUCONATE 15 ML: 1.2 RINSE ORAL at 21:00

## 2024-12-23 RX ADMIN — DEXMEDETOMIDINE HYDROCHLORIDE 0.4 MCG/KG/HR: 400 INJECTION INTRAVENOUS at 06:18

## 2024-12-23 RX ADMIN — CEFEPIME HYDROCHLORIDE 2000 MG: 2 INJECTION, SOLUTION INTRAVENOUS at 01:42

## 2024-12-23 RX ADMIN — OLANZAPINE 10 MG: 10 TABLET, ORALLY DISINTEGRATING ORAL at 21:00

## 2024-12-23 RX ADMIN — QUETIAPINE FUMARATE 25 MG: 25 TABLET ORAL at 08:38

## 2024-12-23 RX ADMIN — PIPERACILLIN AND TAZOBACTAM 4.5 G: 4; .5 INJECTION, POWDER, FOR SOLUTION INTRAVENOUS at 23:55

## 2024-12-23 RX ADMIN — APIXABAN 5 MG: 5 TABLET, FILM COATED ORAL at 08:38

## 2024-12-23 RX ADMIN — LORAZEPAM 2 MG: 2 INJECTION INTRAMUSCULAR; INTRAVENOUS at 01:38

## 2024-12-23 RX ADMIN — CHLORHEXIDINE GLUCONATE 15 ML: 1.2 RINSE ORAL at 08:38

## 2024-12-23 RX ADMIN — DEXMEDETOMIDINE HYDROCHLORIDE 1 MCG/KG/HR: 400 INJECTION INTRAVENOUS at 22:16

## 2024-12-23 RX ADMIN — IPRATROPIUM BROMIDE AND ALBUTEROL SULFATE 3 ML: .5; 3 SOLUTION RESPIRATORY (INHALATION) at 02:54

## 2024-12-23 RX ADMIN — ACETYLCYSTEINE 600 MG: 200 SOLUTION ORAL; RESPIRATORY (INHALATION) at 02:54

## 2024-12-23 RX ADMIN — ACETYLCYSTEINE 600 MG: 200 SOLUTION ORAL; RESPIRATORY (INHALATION) at 19:17

## 2024-12-23 RX ADMIN — PIPERACILLIN AND TAZOBACTAM 4.5 G: 4; .5 INJECTION, POWDER, FOR SOLUTION INTRAVENOUS at 12:12

## 2024-12-23 RX ADMIN — DEXMEDETOMIDINE HYDROCHLORIDE 0.8 MCG/KG/HR: 400 INJECTION INTRAVENOUS at 17:57

## 2024-12-23 RX ADMIN — ACETYLCYSTEINE 600 MG: 200 SOLUTION ORAL; RESPIRATORY (INHALATION) at 08:26

## 2024-12-23 RX ADMIN — IPRATROPIUM BROMIDE AND ALBUTEROL SULFATE 3 ML: .5; 3 SOLUTION RESPIRATORY (INHALATION) at 13:22

## 2024-12-23 RX ADMIN — IPRATROPIUM BROMIDE AND ALBUTEROL SULFATE 3 ML: .5; 3 SOLUTION RESPIRATORY (INHALATION) at 19:17

## 2024-12-23 RX ADMIN — IPRATROPIUM BROMIDE AND ALBUTEROL SULFATE 3 ML: .5; 3 SOLUTION RESPIRATORY (INHALATION) at 08:25

## 2024-12-23 RX ADMIN — PIPERACILLIN AND TAZOBACTAM 4.5 G: 4; .5 INJECTION, POWDER, FOR SOLUTION INTRAVENOUS at 16:17

## 2024-12-23 RX ADMIN — LORAZEPAM 2 MG: 2 INJECTION INTRAMUSCULAR; INTRAVENOUS at 23:21

## 2024-12-23 RX ADMIN — ACETAMINOPHEN 1000 MG: 1000 INJECTION, SOLUTION INTRAVENOUS at 15:31

## 2024-12-23 NOTE — PLAN OF CARE
Problem: RESPIRATORY - ADULT  Goal: Achieves optimal ventilation and oxygenation  Description: INTERVENTIONS:  - Assess for changes in respiratory status  - Assess for changes in mentation and behavior  - Position to facilitate oxygenation and minimize respiratory effort  - Oxygen administered by appropriate delivery if ordered  - Initiate smoking cessation education as indicated  - Encourage broncho-pulmonary hygiene including cough, deep breathe, Incentive Spirometry  - Assess the need for suctioning and aspirate as needed  - Assess and instruct to report SOB or any respiratory difficulty  - Respiratory Therapy support as indicated  12/23/2024 1419 by Nimo Cornejo, RT  Outcome: Progressing  12/23/2024 1002 by Nimo Cornejo, RT  Outcome: Progressing

## 2024-12-23 NOTE — CONSULTS
[facility]   Neurology Initial Consult    Hemanth Swanson is a 39 y.o. male  ICU 05/ICU 05          Information obtained from:   No chief complaint on file.        Assessment & Plan  Abnormal movements  Pt is a 38yo male with long Neurological work up and history who presented to the hospital with sepsis pneumonia who has had increased agitation at home.  He was brought to the hospital and Critical Care team working on sepsis pneumonia, vent support and agitation.    Currently pt is awake and able to participate in exam, however, critical care team management of his behaviors for now.  Psych has been consulted for assist with overall management of mood and behaviors.     Pt had onset of movements that appeared to start with jaw shaking followed by rocking of the LE while awake and responding.  It appeared to be seizure like, but unclear as pt did not seem confused at all.   Neurology consulted for input and EEG has been ordered.   To Note:   Pt has had prior 24hr observed EEG testing and had multiple episodes of jaw shaking, leg extension and rocking of the bed without evidence of seizure potentials, described as motion artifact.     - Neurological assessments as needed  - EEG is pending for now  - No additional needs for medication at this time pending eval by Neurology MD or positive findings on EEG.    Pneumonia due to Pseudomonas and Serratia  Critical Care team management of Sepsis Pneumonia  Acute on chronic respiratory failure with hypoxia and hypercapnia (HCC)  Critical Care Management of vent support  Limit sedative medications  Delirium precautions  Mixed axonal-demyelinating polyneuropathy  Patient has had extensive neurological workup.    EMG did not show electrical myotonia.    Negative symptoms, Labs and antibodies for electrical myotonia, neuromuscular disease or denervating disease including not limited to Pompeii disease, Burned-out polymyositis, motor neuron disease such as Alla  Gehrig's, MG, Lambert Eaton syndrome and CIDP,.  Last treated with IVIG in October of 2024 w/out affect.     Toxic metabolic encephalopathy  Recommended psychiatric evaluation of underlying psych disease and etiology of some symptoms  Limit sedative medications  Delirium precautions  Agitation  Critical Care team management of agitation, currently on precidex.  Psych on consult for assist   Mood disorder (HCC)  Consultation for Psych for eval of psych related contribution to pt symptoms and assist with agitation and h/o psychiatric disease/disorder.     Hemanth Swanson will need follow-up in at the next regular appointment with neuromuscular team for Other in 60 minute appointment. They will not require outpatient neurological testing.      HPI:  Hemanth Swanosn is a 40yo male with PMH of anxiety, depression, cancers and bipolar disorder who has been followed extensively by the neurology team for unexplained respiratory failure.  Concerns for neuromuscular disease unfortunately did not appear to be the case.  He is post trach, PEG, chronic respiratory failure, heart failure with pleural effusions, diabetic, hypertensive.  Most recent hospitalization in October 2024 he had extensive evaluation for underlying neuromuscular disease in addition to toxic metabolic encephalopathy.    Patient was brought to the ER 12/16/2024 with increased agitation and concerns for infection.  Patient's family had noticed an increase in his agitation, he required IM Versed, IV Versed to assist.  He had a CT revealing a collapse of the left lower lobe and severe atelectasis of the right lower lobe similar to prior studies in October.  He was transferred to [facility] for care.  He has had prior MRI of the brain with images consistent with anoxic brain injury.  He does get bradycardic therefore sedatives have been limited.  Recommended delirium precaution and infectious workup.  He was hyponatremic with a sodium of 162, was able to  decrease to goal range of 150.  He has been treated through the critical care team for sepsis with pneumonia due to Pseudomonas and Serratia was started on cefepime for out of 5 days, also had Zosyn and Vanco.  He is on Eliquis but was held for hematuria.  This is since resolved and was resumed on 12/19/2024.  Has continued to have periods of agitation and anger, psychiatry has been consulted however currently deferring evaluation due to his degree of illness.  However encouraged psych involvement due to patient's history of bipolar disorder, anxiety/depression.  Patient is alert and oriented, able to communicate and answer questions despite his chronic illness.  Critical care team working with psychiatry for further discussions.  Per the critical care team patient is having abnormal movements after following some commands.  Critical care describing as rhythmic jaw movements with convulsions had been noted, neurology was consulted.  Patient has had extensive neurological workup.  Last office appointment 7/17/2024 questioning etiology of patient's respiratory failures.  Patient's EMG did not show electrical myotonia.  He did not have hatchet face, saber shins or other stigmata.  CPK levels had been normal and he has no fixed proximal muscle weakness or myopathic features.  Amarilys 1 antibody was negative, Pompeii disease is consideration and can present as failure to wean.  Burned-out polymyositis is more calmer presentation of this condition but was not suspected clinically however could not exclude therefore recommendations for testing to be sent to the [facility], which was negative.  Denervating disease specifically motor neuron disease such as Alla Gehrig's can cause respiratory failure but not common to affect the respiratory muscles first.  He had no fasciculations clinically or electrically except for subclinical radiculopathy, this was excluded.  Neuromuscular disease such as myasthenia gravis repetitive nerve  stimulation testing of the ulnar nerve was negative.  All full antibody testing was negative.  No evidence on his exam to suggest the diagnosis is correct.  Lambert Eaton syndrome was tested for and excluded based on antibodies testing.  Last hospitalization patient had lumbar puncture with an opening pressure of 43.  Initial CSF study showed no evidence of active inflammatory disease.  He had normal protein normal glucose and cell count, it was negative peritoneal neoplastic.  There was question for repeat CSF serology, CIDP was a possibility and he received 5-day cycle of IVIG with recommendations for further management metabolic and infectious etiologies.  To Note:   Pt has had prior 24hr observed EEG testing and had multiple episodes of jaw shaking, leg extension and rocking of the bed without evidence of seizure potentials, described as motion artifact.     Spoke with patient at bedside today, states that he has had movements in the past, does not really recognize them and does not cause him distress.  He denies headache, dizziness, changes to his vision as of recently although does have a history of diplopia.  Patient is nonverbal on ventilator support through trach.  He is able to nod yes and no and participate and communicate.  He is able to follow all commands.  He has movement and strength to all of his extremities however left lower extremity appears to be weaker than the others.  Has equal sensation, CN II through XII intact.  Some of the exam is limited due to patient's weaknesses and abilities to verbally communicate.  No movements were noted with the exception of some rhythmic lower jaw movement during exam.    From Provider descriptions, it appears pt symptoms are similar to those push button events on his EMU vEEG without EEG changes suggestive of non epileptic spells.    It is unclear what is causing these event, however, is non epileptic. Unlikely to add any additional medication that may cause pt  mental status changes or decreased wakefulness.    Encourage pt to have psychiatric evaluation given his history is significant for psychiatric d/o.     Past Medical History:   Diagnosis Date    Anxiety     Cancer (HCC)     Depression     Hypernatremia 10/06/2024    Migration of percutaneous endoscopic gastrostomy (PEG) tube (HCC) 09/24/2023    Psychiatric disorder     bipolar       Past Surgical History:   Procedure Laterality Date    IR BIOPSY LYMPH NODE  01/20/2023    IR GASTROSTOMY (G) TUBE CHECK/CHANGE/REINSERTION/UPSIZE  6/18/2024    IR GASTROSTOMY TUBE PLACEMENT  09/25/2023    IR LUMBAR PUNCTURE  09/06/2023    IR LUMBAR PUNCTURE  10/25/2024    IR PORT PLACEMENT  03/14/2023    ORCHIECTOMY Left 12/14/2022    Procedure: ORCHIECTOMY INGUINAL;  Surgeon: Flip Michael MD;  Location:  MAIN OR;  Service: Urology    PEG W/TRACHEOSTOMY PLACEMENT N/A 09/08/2023    Procedure: TRACHEOSTOMY WITH INSERTION PEG TUBE;  Surgeon: Rudy Mcmanus MD;  Location: [facility]IN OR;  Service: General    TESTICLE SURGERY         Allergies   Allergen Reactions    Dog Epithelium Cough, Sneezing and Nasal Congestion         Current Facility-Administered Medications:     acetaminophen (Ofirmev) injection 1,000 mg, 1,000 mg, Intravenous, Q8H PRN, Julissa Spironello V, CRNP    acetylcysteine (MUCOMYST) 200 mg/mL inhalation solution 600 mg, 3 mL, Nebulization, Q6H, Julissa Spironello V, CRNP, 600 mg at 12/23/24 0826    apixaban (ELIQUIS) tablet 5 mg, 5 mg, Per PEG Tube, BID, AMBER Dean, 5 mg at 12/23/24 0838    chlorhexidine (PERIDEX) 0.12 % oral rinse 15 mL, 15 mL, Mouth/Throat, Q12H ISAEL, MINE MieNP, 15 mL at 12/23/24 0838    dexmedeTOMIDine (Precedex) 400 mcg in sodium chloride 0.9% 100 mL, 0.1-0.7 mcg/kg/hr, Intravenous, Titrated, AMBER Dean, Last Rate: 5 mL/hr at 12/23/24 0623, 0.2 mcg/kg/hr at 12/23/24 0623    ipratropium-albuterol (DUO-NEB) 0.5-2.5 mg/3 mL inhalation  "solution 3 mL, 3 mL, Nebulization, Q6H, Julissa Spironello V, CRNP, 3 mL at 24 0825    LORazepam (ATIVAN) injection 2 mg, 2 mg, Intravenous, Q4H PRN, Julissa Spironello V, CRNP, 2 mg at 24 0138    melatonin tablet 6 mg, 6 mg, Oral, HS, Carol Oroker, MINENP, 6 mg at 24 210    piperacillin-tazobactam (ZOSYN) 4.5 g in sodium chloride 0.9 % 100 mL IV LOADING DOSE, 4.5 g, Intravenous, Once, Last Rate: 200 mL/hr at 24 1212, 4.5 g at 24 1212 **FOLLOWED BY** piperacillin-tazobactam (ZOSYN) 4.5 g in sodium chloride 0.9 % 100 mL IVPB (EXTENDED INFUSION), 4.5 g, Intravenous, Q8H, MINE DeanNP    QUEtiapine (SEROquel) tablet 50 mg, 50 mg, Oral, BID, MINE DeanNP    Social History     Socioeconomic History    Marital status: Single     Spouse name: Not on file    Number of children: Not on file    Years of education: Not on file    Highest education level: Not on file   Occupational History    Not on file   Tobacco Use    Smoking status: Former     Current packs/day: 0.00     Average packs/day: 0.7 packs/day for 22.7 years (15.0 ttl pk-yrs)     Types: Cigarettes     Start date: 2008     Quit date: 2023     Years since quittin.5    Smokeless tobacco: Never   Vaping Use    Vaping status: Former    Start date: 2018    Quit date: 2023    Substances: Nicotine, THC   Substance and Sexual Activity    Alcohol use: Not Currently     Comment: 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 Not currently using crack cocaine.    Sexual activity: Not Currently     Partners: Female     Birth control/protection: Other   Other Topics Concern    Not on file   Social History Narrative    ** Merged History Encounter **          Social Drivers of Health     Financial Resource Strain: Low Risk  (2024)    Overall Financial Resource Strain (CARDIA)     " "Difficulty of Paying Living Expenses: Not hard at all   Food Insecurity: Patient Unable To Answer (2024)    Nursing - Inadequate Food Risk Classification     Worried About Running Out of Food in the Last Year: Never true     Ran Out of Food in the Last Year: Never true     Ran Out of Food in the Last Year: 97   Transportation Needs: Patient Unable To Answer (2024)    Nursing - Transportation Risk Classification     Lack of Transportation: Not on file     Lack of Transportation: 97   Physical Activity: Not on file   Stress: Not on file   Social Connections: Not on file   Intimate Partner Violence: Patient Unable To Answer (2024)    Nursing IPS     Feels Physically and Emotionally Safe: Not on file     Physically Hurt by Someone: Not on file     Humiliated or Emotionally Abused by Someone: Not on file     Physically Hurt by Someone: 97     Hurt or Threatened by Someone: 97   Housing Stability: Patient Unable To Answer (2024)    Nursing: Inadequate Housing Risk Classification     Has Housing: Not on file     Worried About Losing Housing: Not on file     Unable to Get Utilities: Not on file     Unable to Pay for Housing in the Last Year: 97     Has Housin       Family History   Problem Relation Age of Onset    Coronary artery disease Maternal Aunt     Cancer Father     Diabetes Father     Hypertension Father          Review of systems:  Pt unable to verbalized ROS, denies headache, dizziness, vision changes or abnormal movement.     Physical examination:  /66   Pulse 89   Temp (!) 96.6 °F (35.9 °C) (Temporal)   Resp 16   Ht 6' 4\" (1.93 m)   Wt 100 kg (221 lb 1.9 oz)   SpO2 96%   BMI 26.92 kg/m²     GENERAL APPEARANCE:  The patient is alert, oriented.    HEENT:  Head is normocephalic.  Pupils are equal and reactive.    NECK:  Supple without lymphadenopathy.   HEART:  Regular rate and rhythm.  LUNGS:  per vent assist  ABDOMEN:  Soft, nontender, nondistended.    EXTREMITIES:  " Without cyanosis orclubbing   + dependent edema of the hands    Mental status:  The patient is alert, attentive, and oriented.   Speech is absent, able to nod and communicate effectively.   Cranial nerves:  CN II: Visual fields are full to confrontation.   Fundoscopic exam is normal with sharp discs and no vascular changes.  Pupils are 4 mm and briskly reactive to light.   CN III, IV, VI: At primary gaze, there is no eye deviation.   CN V: Facial sensation is intact in all 3 divisions bilaterally.   Corneal responses are intact.  CN VII: Face is symmetric with normal eye closure and smile.  CN VIII: Hearing is normal to rubbing fingers  CN IX, X: Palate unable to be assessed. Phonation is absent  CN XI: Head turning and shoulder shrug are intact  CN XII: Tongue is midline with normal movements and no atrophy.  Motor:  There is no pronator drift of out-stretched arms.    Muscle bulk and tone are with mild atrophy of the LE.   Muscle exam  Arm Right Left Leg Right Left   Deltoid 4/5 4/5 Iliopsoas 3+/5 3/5   Biceps 4/5 4/5 Quads 3+/5 3+/5   Triceps 4/5 4/5 Hamstrings 3+/5 3+/5   Wrist Extension 4/5 4/5 Ankle Dorsi Flexion 3+/5 3+/5   Wrist Flexion 4/5 4/5 Ankle Plantar Flexion 3+/5 3+/5        Reflexes    RJ BJ TJ KJ AJ Plantars Cornejo's   Right tr 0  0 0 absent Not present   Left tr 0   0 0 absent Not present      Slight prepatellar reflex on the left    Sensory:  Light touch, Temperature, position sense, and vibration sense are intact in fingers and toes.  Coordination:  Rapid alternating movements and fine finger movements are slow    There is unable to finger-to-nose and heel-knee-shin due to weakness  There are + abnormal or extraneous movements with some movement of the lower mouth/jaw randomly with eyes closed.  No chorea/movement type movement during exam.   Deferred Romberg.  Gait/Stance:  Deferred    Lab Results   Component Value Date    WBC 12.19 (H) 12/23/2024    HGB 10.3 (L) 12/23/2024    HCT 32.4 (L)  12/23/2024    MCV 95 12/23/2024     12/23/2024     Lab Results   Component Value Date    HGBA1C 5.3 11/01/2023     Lab Results   Component Value Date    ALT 24 12/16/2024    AST 18 12/16/2024    ALKPHOS 95 12/16/2024     Lab Results   Component Value Date    GLUCOSE 184 (H) 05/31/2024    CALCIUM 9.0 12/23/2024    K 4.2 12/23/2024    CO2 32 12/23/2024     12/23/2024    BUN 14 12/23/2024    CREATININE 0.39 (L) 12/23/2024         Review of reports and notes reveal:  Independent Interpretation of images or specimens:  XR chest portable  Result Date: 12/20/2024  Poor inspiration. Congestive changes and effusion suggested.. Workstation performed: [id number]     XR chest portable  Result Date: 12/19/2024  No significant interval change. Workstation performed: [id number]     XR chest portable ICU  Result Date: 12/18/2024  Platelike atelectasis left midlung field. Stable line and tube positioning. Low lung volumes. Workstation performed: [id number]           Thank you for this consult.    Total time of encounter: 70 Minutes  More than 50% of time was spent in counseling and coordination of care of patient. D/w the pt at the bedside, medical team and attending neurology MD.

## 2024-12-23 NOTE — PROGRESS NOTES
Intended introductory visit with pt who is resting comfortably at this time. Interfaith blessing offered outside of pt room.       Available to follow upon request.       Thank you!

## 2024-12-23 NOTE — ASSESSMENT & PLAN NOTE
12/16 CT chest revealing complete collapse of the left lower lobe and severe atelectasis of the right lower lobe similar to prior examination. Superimposed aspiration or pneumonia cannot be excluded. Trace left pleural effusion.   12/16 Sputum culture + Pseudomonas and Serratia    Plan  Cefepime D5/7  Aggressive chest PT, mucomyst/duo-neb  Rest of plan as outlined

## 2024-12-23 NOTE — ASSESSMENT & PLAN NOTE
Pt is a 40yo male with long Neurological work up and history who presented to the hospital with sepsis pneumonia who has had increased agitation at home.  He was brought to the hospital and Critical Care team working on sepsis pneumonia, vent support and agitation.    Currently pt is awake and able to participate in exam, however, critical care team management of his behaviors for now.  Psych has been consulted for assist with overall management of mood and behaviors.     Pt had onset of movements that appeared to start with jaw shaking followed by rocking of the LE while awake and responding.  It appeared to be seizure like, but unclear as pt did not seem confused at all.   Neurology consulted for input and EEG has been ordered.   To Note:   Pt has had prior 24hr observed EEG testing and had multiple episodes of jaw shaking, leg extension and rocking of the bed without evidence of seizure potentials, described as motion artifact.     - Neurological assessments as needed  - EEG is pending for now  - No additional needs for medication at this time pending eval by Neurology MD or positive findings on EEG.

## 2024-12-23 NOTE — ASSESSMENT & PLAN NOTE
Please see above for plan for mood disorder.    Recommend Haldol 5 mg every 6 hours as needed for acute agitation, recommend p.o. if patient is able to take this but can use IM if unable to tolerate p.o. and acute concern for her safety.

## 2024-12-23 NOTE — ASSESSMENT & PLAN NOTE
Consultation for Psych for eval of psych related contribution to pt symptoms and assist with agitation and h/o psychiatric disease/disorder.

## 2024-12-23 NOTE — WOUND OSTOMY CARE
Progress Note - Wound   Hemanth Swanson 39 y.o. male MRN: [id number]  Unit/Bed#: ICU 05 Encounter: [id number]        Assessment:   This wound RN attempted to see patient x 2 today. During 1st visit, patient was having behavioral health consult done remotely - was also told by another staff RN that patient is compulsive and may hit. During 2nd visit this wound RN was advised that patient is too agitated to have wound visit done today.     Findings:  1-Dressings were changed yesterday and appeared clean and mostly intact. Will attempt re-visit later in the week.  2-Patient has developed new unstageable pressure injury to left ear (POA) with yellow slough and pink granulation tissue - orders in place for wound care.    Wound Care Plan:  1-Cleanse wound to right knee, right heel and left hand with NSS & pat dry. Apply thin layer of Normlgel to Adaptic cut to size of wounds then apply Adaptic to wounds. Cover with ABD, rolled gauze and tape. May apply small foam dressing to right knee. Change every other day & prn soilage/dislodgement.  2-Cleanse wound to right medial foot with NSS & pat dry. Apply Betadine to wound, cover with ABD pad, rolled gauze and tape. Change every other day & prn soilage/dislodgement.  3-Cleanse wounds to bilateral groin with NSS or foaming cleanser & pat dry. Apply ABD pads and gentle paper tape. Change daily & prn soilage/dislodgement.  4-Cleanse unstageable pressure injury to left ear with NSS & pat dry. Apply THIN layer of Normlgel to wound, 2x2 gauze and gentle paper tape. Change daily & prn soilage/dislodgement.  5-Apply Prevent barrier cream to bilateral sacrum, buttock and heels BID and PRN  6-Heel lift boots to be worn to bilateral heels at all times in bed and after transfer to reclining chair if able. If patient unable to tolerate, must float heels on 2 pillows to offload pressure so heels are not in contact with mattress or pillows.  7-Ehob pressure redistribution cushion in chair  when out of bed if able. Avoid prolonged sitting.  8-Moisturize skin daily with skin nourishing cream.  9-Turn/reposition q2h or when medically stable for pressure re-distribution on skin.     Wound 12/17/24 Pressure Injury Ear Left (Active)   Wound Image  Unstageable pressure injury to left ear present on admission - orders in place for wound care 12/17/24 2000     Discussed assessment findings, and plan of care/recommendations with Anoop JEONG.    Wound care will follow along with patient throughout admission, please call or text with questions and concerns.    Recommendations written as orders.  Irma David MSN, RN, CWON

## 2024-12-23 NOTE — ASSESSMENT & PLAN NOTE
S/p trach for NM disease.  Baseline Vent dependent  Trach 6XL  History of significant mucous plugs  CT chest revealing complete collapse of the left lower lobe and severe atelectasis of the right lower lobe similar to prior examination. Superimposed aspiration or pneumonia cannot be excluded. Trace left pleural effusion.     Plan:  Continue ACVC 14/500/40/8 (PEEP 6 at home, otherwise same home settings)   Aggressive pulmonary hygiene   Mucomyst, duo-nebs q6h  Chest therapy  Frequent suctioning/turning  Antibiotics as above  F/u OP with Pulmonology

## 2024-12-23 NOTE — ASSESSMENT & PLAN NOTE
Patient with history of waxing and waning agitation   Required 8 mg IM Versed + 2 mg IV versed at SLE  CT negative  UDS + THC, benzos   MRI during previous admission revealed images consistent with anoxic brain injury  Hypernatremia now corrected    Plan:  Limit sedatives that can cause bradycardia; as patient has history of vagal/bradycardiac events   Precedex for RASS 0   Responds well to precedex, consider starting clonidine   Started on seroquel 25mg BID  PRN ativan 2mg [id number] x 1 overnight   Delirium precautions  CAM ICU, fall precautions  Neuro checks per protocol   EEG ordered

## 2024-12-23 NOTE — RESPIRATORY THERAPY NOTE
Pt recd on a Hook C-3 vent setting as per flow sheet all alarm on and functional vent working properly. Vent check  pt wrist restants are secure. Vent plugged in to red out.

## 2024-12-23 NOTE — SPEECH THERAPY NOTE
F/U completed via chart review and discussion with MD/AMBER.    Plan: continue medication management/optimization prior to VBS (plan for VBS prior to d/c when status optimized).   Ruht Escobar MS CCC-SLP   [place] License [id number]

## 2024-12-23 NOTE — ASSESSMENT & PLAN NOTE
Family reports that Hemanth has had episodes of agitation at home   While hospitalized, he has had multiple episodes of the same. During agitation events, he holds his breath and desaturates. He also tries to hit and kick staff so now he is restrained for safety. He denies pain, respiratory distress during these episodes. Minimal secretions suctioned. No clear prompting events lead to agitation  Seroquel BID started on 12/21  Precedex 0.1mcg, titrate for RASS 0  Ativan 2mg IV q4h PRN, received x 1 overnight   Psychiatry consulted

## 2024-12-23 NOTE — ASSESSMENT & PLAN NOTE
Recommended psychiatric evaluation of underlying psych disease and etiology of some symptoms  Limit sedative medications  Delirium precautions

## 2024-12-23 NOTE — CONSULTS
e-Consult (IPC)  - Interventional Radiology  Hemanth Swanson 39 y.o. male MRN: [id number]  Unit/Bed#: ICU 05 Encounter: [id number]    Interventional Radiology has been consulted to evaluate Hemanth Swanson    We were consulted by Dr. Cardoza concerning this patient with a clogged G tube.    Inpatient Consult to IR  Consult performed by: Bhupinder Aguilar MD  Consult ordered by: AMBER Dean        12/23/24    Assessment/Recommendation:   Clogged G tube.  IR to change the tube.    11-20 minutes, >50% of the total time devoted to medical consultative verbal/EMR discussion between providers. Written report will be generated in the EMR.     Thank you for allowing Interventional Radiology to participate in the care of Hemanth Swanson. Please don't hesitate to call or TigerText us with any questions.     Bhupinder Aguilar MD

## 2024-12-23 NOTE — ASSESSMENT & PLAN NOTE
Tachypnea, leukocytosis. HD stable. Lactic 3.1-> 1  ED course: 1L Isolyte, Zosyn, Vanco  Source: Pulmonary  CT chest: complete collapse of the left lower lobe and severe atelectasis of the right lower lobe similar to prior examination. Superimposed aspiration or pneumonia cannot be excluded. Trace left pleural effusion   Sputum culture: pseudomonas and serratia  1 of 2 BC's 12/16 + staph epi- likely contaminant  HD stable    Plan  Treatment for pneumonia as above  Trend CBC, temperature curve, procal

## 2024-12-23 NOTE — PROGRESS NOTES
Progress Note - Critical Care/ICU   Name: Hemanth Swanson 39 y.o. male I MRN: [id number]  Unit/Bed#: ICU 05 I Date of Admission: 12/16/2024   Date of Service: 12/23/2024 I Hospital Day: 7      Assessment & Plan  Acute on chronic respiratory failure with hypoxia and hypercapnia (HCC)  S/p trach for NM disease.  Baseline Vent dependent  Trach 6XL  History of significant mucous plugs  CT chest revealing complete collapse of the left lower lobe and severe atelectasis of the right lower lobe similar to prior examination. Superimposed aspiration or pneumonia cannot be excluded. Trace left pleural effusion.     Plan:  Continue ACVC 14/500/40/8 (PEEP 6 at home, otherwise same home settings)   Aggressive pulmonary hygiene   Mucomyst, duo-nebs q6h  Chest therapy  Frequent suctioning/turning  Antibiotics as above  F/u OP with Pulmonology   Toxic metabolic encephalopathy  Patient with history of waxing and waning agitation   Required 8 mg IM Versed + 2 mg IV versed at SLE  CT negative  UDS + THC, benzos   MRI during previous admission revealed images consistent with anoxic brain injury  Hypernatremia now corrected    Plan:  Limit sedatives that can cause bradycardia; as patient has history of vagal/bradycardiac events   Precedex for RASS 0   Responds well to precedex, consider starting clonidine   Started on seroquel 25mg BID  PRN ativan 2mg [id number] x 1 overnight   Delirium precautions  CAM ICU, fall precautions  Neuro checks per protocol   EEG ordered   Sepsis (HCC)  Tachypnea, leukocytosis. HD stable. Lactic 3.1-> 1  ED course: 1L Isolyte, Zosyn, Vanco  Source: Pulmonary  CT chest: complete collapse of the left lower lobe and severe atelectasis of the right lower lobe similar to prior examination. Superimposed aspiration or pneumonia cannot be excluded. Trace left pleural effusion   Sputum culture: pseudomonas and serratia  1 of 2 BC's 12/16 + staph epi- likely contaminant  HD stable    Plan  Treatment for pneumonia as  above  Trend CBC, temperature curve, procal  Pneumonia due to Pseudomonas and Serratia  12/16 CT chest revealing complete collapse of the left lower lobe and severe atelectasis of the right lower lobe similar to prior examination. Superimposed aspiration or pneumonia cannot be excluded. Trace left pleural effusion.   12/16 Sputum culture + Pseudomonas and Serratia    Plan  Cefepime D5/7  Aggressive chest PT, mucomyst/duo-neb  Rest of plan as outlined     Agitation  Family reports that Hemanth has had episodes of agitation at home   While hospitalized, he has had multiple episodes of the same. During agitation events, he holds his breath and desaturates. He also tries to hit and kick staff so now he is restrained for safety. He denies pain, respiratory distress during these episodes. Minimal secretions suctioned. No clear prompting events lead to agitation  Seroquel BID started on 12/21  Precedex 0.1mcg, titrate for RASS 0  Ativan 2mg IV q4h PRN, received x 1 overnight   Psychiatry consulted  Mixed axonal-demyelinating polyneuropathy  Trach and PEG in place  PT/OT  S/P percutaneous endoscopic gastrostomy (PEG) tube placement (HCC)  PEG tube re-placed 12/10 after self dislodgment  Continue TF and FWF  History of pulmonary embolus (PE)  Continue Eliquis   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HCC)  S/p resection and chemo  Umbilical hernia without obstruction and without gangrene  Chronic, deemed not a surgical candidate  Disposition: Critical care    ICU Core Measures     Vented Patient  VAP Bundle  VAP bundle ordered     A: Assess, Prevent, and Manage Pain Has pain been assessed? Yes  Need for changes to pain regimen? No   B: Both Spontaneous Awakening Trials (SATs) and Spontaneous Breathing Trials (SBTs) Plan to perform spontaneous awakening trial today? Yes   Plan to perform spontaneous breathing trial today? No secondary to chronic vent settings  Obvious barriers to extubation? NA   C: Choice of Sedation RASS Goal: 0 Alert and  Calm  Need for changes to sedation or analgesia regimen? No   D: Delirium CAM-ICU: Unable to perform secondary to Dementia or other chronic cognitive dysfunction   E: Early Mobility  Plan for early mobility? Yes   F: Family Engagement Plan for family engagement today? Yes       Antibiotic Review: Patient on appropriate coverage based on culture data.     Review of Invasive Devices:      Central access plan:  unaccessed port       Prophylaxis:  VTE VTE covered by:  apixaban, Per PEG Tube, 5 mg at 12/22/24 1737       Stress Ulcer  not ordered         24 Hour Events : Started on seroquel 25mg BID on 12/21. Precedex weaned to off overnight and then had episode of lower extremity jerking, bradycardia, and hypoxia. Patient awake and following commands during episode. Possible adrenergic surging related to hypoxic brain injury which was followed by a vagal event. Precedex restarted at 0.1mcg and required uptitration back to 0.4mcg. Resumed Ativan 2mg IV q4h PRN, received x 1 overnight with improvement in symptoms.      Subjective   Review of Systems: Review of Systems not obtainable due to Altered mental status    Objective :                   Vitals I/O      Most Recent Min/Max in 24hrs   Temp 98.2 °F (36.8 °C) Temp  Min: 96.9 °F (36.1 °C)  Max: 98.6 °F (37 °C)   Pulse 80 Pulse  Min: 58  Max: 85   Resp 14 Resp  Min: 14  Max: 20   /70 BP  Min: 100/56  Max: 134/74   O2 Sat 95 % SpO2  Min: 94 %  Max: 98 %      Intake/Output Summary (Last 24 hours) at 12/23/2024 0028  Last data filed at 12/22/2024 2000  Gross per 24 hour   Intake 1260.27 ml   Output 1775 ml   Net -514.73 ml       Diet Enteral/Parenteral; Tube Feeding No Oral Diet; Jevity 1.5; Continuous; 70; 150; Water; Every 4 hours    Invasive Monitoring   N/A         Physical Exam   Physical Exam  Eyes:      General: Lids are normal.      Extraocular Movements: Extraocular movements intact.      Conjunctiva/sclera: Conjunctivae normal.      Pupils: Pupils are equal,  round, and reactive to light.   HENT:      Head: Normocephalic and atraumatic.   Neck:      Trachea: Trachea normal.   Cardiovascular:      Rate and Rhythm: Normal rate and regular rhythm.      Pulses: Normal pulses.           Radial pulses are 2+ on the right side and 2+ on the left side.        Dorsalis pedis pulses are 2+ on the right side and 2+ on the left side.      Heart sounds: Normal heart sounds, S1 normal and S2 normal.   Musculoskeletal:      Cervical back: Neck supple.      Comments: Strong B/L hand grasp, UE +4/5, LE +3/5. Legs flexed outward, able to dorsiflex and plantar flex feet   Abdominal: General: Bowel sounds are normal.      Palpations: Abdomen is soft.   Constitutional:       General: He is awake.   Pulmonary:      Effort: Pulmonary effort is normal.      Breath sounds: Decreased breath sounds and rhonchi present.   Psychiatric:         Behavior: Behavior is cooperative.      Comments: Periods of agitation/restlessness    Neurological:      General: No focal deficit present.      Mental Status: He is alert.      GCS: GCS eye subscore is 4. GCS verbal subscore is 1. GCS motor subscore is 6.          Diagnostic Studies        No new imaging      Medications:  Scheduled PRN   acetylcysteine, 3 mL, Q6H  apixaban, 5 mg, BID  cefepime, 2,000 mg, Q12H  chlorhexidine, 15 mL, Q12H ISAEL  ipratropium-albuterol, 3 mL, Q6H  melatonin, 6 mg, HS  QUEtiapine, 25 mg, BID      acetaminophen, 1,000 mg, Q8H PRN       Continuous    dexmedetomidine, 0.1-0.7 mcg/kg/hr, Last Rate: Stopped (12/22/24 6931)         Labs:   CBC    Recent Labs     12/21/24  1012 12/22/24  0504   WBC 13.29* 10.16   HGB 10.0* 10.4*   HCT 31.9* 33.3*    174     BMP    Recent Labs     12/21/24  1012 12/22/24  0504   SODIUM 143 138   K 3.8 4.0    104   CO2 32 30   AGAP 4 4   BUN 13 14   CREATININE 0.41* 0.40*   CALCIUM 8.7 8.7       Coags    No recent results     Additional Electrolytes  Recent Labs     12/21/24  1012  12/22/24  0504   MG 2.1 2.0          Blood Gas    No recent results  No recent results LFTs  No recent results    Infectious  No recent results  Glucose  Recent Labs     12/21/24  1012 12/22/24  0504   GLUC 102 114

## 2024-12-23 NOTE — ASSESSMENT & PLAN NOTE
Patient has had extensive neurological workup.    EMG did not show electrical myotonia.    Negative symptoms, Labs and antibodies for electrical myotonia, neuromuscular disease or denervating disease including not limited to Pompeii disease, Burned-out polymyositis, motor neuron disease such as Alla Gehrig's, MG, Lambert Eaton syndrome and CIDP,.  Last treated with IVIG in October of 2024 w/out affect.

## 2024-12-24 ENCOUNTER — APPOINTMENT (INPATIENT)
Dept: NON INVASIVE DIAGNOSTICS | Facility: HOSPITAL | Age: 39
DRG: 137 | End: 2024-12-24
Attending: RADIOLOGY
Payer: COMMERCIAL

## 2024-12-24 LAB
ANION GAP SERPL CALCULATED.3IONS-SCNC: 8 MMOL/L (ref 4–13)
ATRIAL RATE: 84 BPM
BASE EXCESS BLDA CALC-SCNC: 6 MMOL/L (ref -2–3)
BASOPHILS # BLD AUTO: 0.04 THOUSANDS/ÂΜL (ref 0–0.1)
BASOPHILS NFR BLD AUTO: 0 % (ref 0–1)
BUN SERPL-MCNC: 19 MG/DL (ref 5–25)
CA-I BLD-SCNC: 1.23 MMOL/L (ref 1.12–1.32)
CALCIUM SERPL-MCNC: 9.3 MG/DL (ref 8.4–10.2)
CHLORIDE SERPL-SCNC: 102 MMOL/L (ref 96–108)
CO2 SERPL-SCNC: 33 MMOL/L (ref 21–32)
CREAT SERPL-MCNC: 1.07 MG/DL (ref 0.6–1.3)
EOSINOPHIL # BLD AUTO: 0.11 THOUSAND/ÂΜL (ref 0–0.61)
EOSINOPHIL NFR BLD AUTO: 1 % (ref 0–6)
ERYTHROCYTE [DISTWIDTH] IN BLOOD BY AUTOMATED COUNT: 15.4 % (ref 11.6–15.1)
GFR SERPL CREATININE-BSD FRML MDRD: 88 ML/MIN/1.73SQ M
GLUCOSE SERPL-MCNC: 129 MG/DL (ref 65–140)
GLUCOSE SERPL-MCNC: 96 MG/DL (ref 65–140)
HCO3 BLDA-SCNC: 31 MMOL/L (ref 22–28)
HCT VFR BLD AUTO: 33.7 % (ref 36.5–49.3)
HCT VFR BLD CALC: 32 % (ref 36.5–49.3)
HGB BLD-MCNC: 10.4 G/DL (ref 12–17)
HGB BLDA-MCNC: 10.9 G/DL (ref 12–17)
IMM GRANULOCYTES # BLD AUTO: 0.07 THOUSAND/UL (ref 0–0.2)
IMM GRANULOCYTES NFR BLD AUTO: 1 % (ref 0–2)
LACTATE SERPL-SCNC: 1.1 MMOL/L (ref 0.5–2)
LYMPHOCYTES # BLD AUTO: 0.79 THOUSANDS/ÂΜL (ref 0.6–4.47)
LYMPHOCYTES NFR BLD AUTO: 7 % (ref 14–44)
MAGNESIUM SERPL-MCNC: 2.4 MG/DL (ref 1.9–2.7)
MCH RBC QN AUTO: 30.2 PG (ref 26.8–34.3)
MCHC RBC AUTO-ENTMCNC: 30.9 G/DL (ref 31.4–37.4)
MCV RBC AUTO: 98 FL (ref 82–98)
MONOCYTES # BLD AUTO: 0.81 THOUSAND/ÂΜL (ref 0.17–1.22)
MONOCYTES NFR BLD AUTO: 7 % (ref 4–12)
NEUTROPHILS # BLD AUTO: 10.12 THOUSANDS/ÂΜL (ref 1.85–7.62)
NEUTS SEG NFR BLD AUTO: 84 % (ref 43–75)
NRBC BLD AUTO-RTO: 0 /100 WBCS
P AXIS: 26 DEGREES
PCO2 BLD: 32 MMOL/L (ref 21–32)
PCO2 BLD: 48.4 MM HG (ref 36–44)
PH BLD: 7.41 [PH] (ref 7.35–7.45)
PLATELET # BLD AUTO: 224 THOUSANDS/UL (ref 149–390)
PMV BLD AUTO: 10.4 FL (ref 8.9–12.7)
PO2 BLD: 67 MM HG (ref 75–129)
POTASSIUM BLD-SCNC: 4 MMOL/L (ref 3.5–5.3)
POTASSIUM SERPL-SCNC: 4.3 MMOL/L (ref 3.5–5.3)
PR INTERVAL: 152 MS
QRS AXIS: 74 DEGREES
QRSD INTERVAL: 94 MS
QT INTERVAL: 360 MS
QTC INTERVAL: 425 MS
RBC # BLD AUTO: 3.44 MILLION/UL (ref 3.88–5.62)
SAO2 % BLD FROM PO2: 93 % (ref 60–85)
SODIUM BLD-SCNC: 142 MMOL/L (ref 136–145)
SODIUM SERPL-SCNC: 143 MMOL/L (ref 135–147)
SPECIMEN SOURCE: ABNORMAL
T WAVE AXIS: 21 DEGREES
VENTRICULAR RATE: 84 BPM
WBC # BLD AUTO: 11.94 THOUSAND/UL (ref 4.31–10.16)

## 2024-12-24 PROCEDURE — 99232 SBSQ HOSP IP/OBS MODERATE 35: CPT | Performed by: ANESTHESIOLOGY

## 2024-12-24 PROCEDURE — 82803 BLOOD GASES ANY COMBINATION: CPT

## 2024-12-24 PROCEDURE — 83735 ASSAY OF MAGNESIUM: CPT

## 2024-12-24 PROCEDURE — 85014 HEMATOCRIT: CPT

## 2024-12-24 PROCEDURE — 85025 COMPLETE CBC W/AUTO DIFF WBC: CPT

## 2024-12-24 PROCEDURE — 94669 MECHANICAL CHEST WALL OSCILL: CPT

## 2024-12-24 PROCEDURE — 94760 N-INVAS EAR/PLS OXIMETRY 1: CPT

## 2024-12-24 PROCEDURE — 93010 ELECTROCARDIOGRAM REPORT: CPT | Performed by: INTERNAL MEDICINE

## 2024-12-24 PROCEDURE — 83605 ASSAY OF LACTIC ACID: CPT

## 2024-12-24 PROCEDURE — NC001 PR NO CHARGE

## 2024-12-24 PROCEDURE — 80048 BASIC METABOLIC PNL TOTAL CA: CPT

## 2024-12-24 PROCEDURE — 84295 ASSAY OF SERUM SODIUM: CPT

## 2024-12-24 PROCEDURE — 84132 ASSAY OF SERUM POTASSIUM: CPT

## 2024-12-24 PROCEDURE — 97110 THERAPEUTIC EXERCISES: CPT

## 2024-12-24 PROCEDURE — 94640 AIRWAY INHALATION TREATMENT: CPT

## 2024-12-24 PROCEDURE — 94668 MNPJ CHEST WALL SBSQ: CPT

## 2024-12-24 PROCEDURE — 82947 ASSAY GLUCOSE BLOOD QUANT: CPT

## 2024-12-24 PROCEDURE — 82330 ASSAY OF CALCIUM: CPT

## 2024-12-24 PROCEDURE — 94003 VENT MGMT INPAT SUBQ DAY: CPT

## 2024-12-24 RX ORDER — QUETIAPINE FUMARATE 25 MG/1
50 TABLET, FILM COATED ORAL 2 TIMES DAILY
Status: DISCONTINUED | OUTPATIENT
Start: 2024-12-24 | End: 2024-12-25

## 2024-12-24 RX ORDER — ALBUMIN HUMAN 50 G/1000ML
25 SOLUTION INTRAVENOUS ONCE
Status: COMPLETED | OUTPATIENT
Start: 2024-12-24 | End: 2024-12-24

## 2024-12-24 RX ORDER — POLYETHYLENE GLYCOL 3350 17 G/17G
17 POWDER, FOR SOLUTION ORAL DAILY PRN
Status: DISCONTINUED | OUTPATIENT
Start: 2024-12-24 | End: 2024-12-27 | Stop reason: HOSPADM

## 2024-12-24 RX ORDER — LORAZEPAM 2 MG/ML
1 INJECTION INTRAMUSCULAR EVERY 4 HOURS PRN
Status: DISCONTINUED | OUTPATIENT
Start: 2024-12-24 | End: 2024-12-25

## 2024-12-24 RX ADMIN — DEXMEDETOMIDINE HYDROCHLORIDE 1 MCG/KG/HR: 400 INJECTION INTRAVENOUS at 06:37

## 2024-12-24 RX ADMIN — IPRATROPIUM BROMIDE AND ALBUTEROL SULFATE 3 ML: .5; 3 SOLUTION RESPIRATORY (INHALATION) at 19:27

## 2024-12-24 RX ADMIN — IPRATROPIUM BROMIDE AND ALBUTEROL SULFATE 3 ML: .5; 3 SOLUTION RESPIRATORY (INHALATION) at 01:31

## 2024-12-24 RX ADMIN — POLYETHYLENE GLYCOL 3350 17 G: 17 POWDER, FOR SOLUTION ORAL at 17:29

## 2024-12-24 RX ADMIN — CHLORHEXIDINE GLUCONATE 15 ML: 1.2 RINSE ORAL at 20:12

## 2024-12-24 RX ADMIN — APIXABAN 5 MG: 5 TABLET, FILM COATED ORAL at 17:29

## 2024-12-24 RX ADMIN — CHLORHEXIDINE GLUCONATE 15 ML: 1.2 RINSE ORAL at 09:44

## 2024-12-24 RX ADMIN — APIXABAN 5 MG: 5 TABLET, FILM COATED ORAL at 09:44

## 2024-12-24 RX ADMIN — ACETYLCYSTEINE 600 MG: 200 SOLUTION ORAL; RESPIRATORY (INHALATION) at 14:36

## 2024-12-24 RX ADMIN — ACETYLCYSTEINE 600 MG: 200 SOLUTION ORAL; RESPIRATORY (INHALATION) at 07:39

## 2024-12-24 RX ADMIN — IPRATROPIUM BROMIDE AND ALBUTEROL SULFATE 3 ML: .5; 3 SOLUTION RESPIRATORY (INHALATION) at 14:36

## 2024-12-24 RX ADMIN — DEXMEDETOMIDINE HYDROCHLORIDE 1 MCG/KG/HR: 400 INJECTION INTRAVENOUS at 02:27

## 2024-12-24 RX ADMIN — LORAZEPAM 2 MG: 2 INJECTION INTRAMUSCULAR; INTRAVENOUS at 06:38

## 2024-12-24 RX ADMIN — DEXMEDETOMIDINE HYDROCHLORIDE 0.8 MCG/KG/HR: 400 INJECTION INTRAVENOUS at 11:03

## 2024-12-24 RX ADMIN — ALBUMIN (HUMAN) 25 G: 12.5 INJECTION, SOLUTION INTRAVENOUS at 21:49

## 2024-12-24 RX ADMIN — PIPERACILLIN AND TAZOBACTAM 4.5 G: 4; .5 INJECTION, POWDER, FOR SOLUTION INTRAVENOUS at 16:25

## 2024-12-24 RX ADMIN — ACETYLCYSTEINE 600 MG: 200 SOLUTION ORAL; RESPIRATORY (INHALATION) at 01:31

## 2024-12-24 RX ADMIN — LORAZEPAM 2 MG: 2 INJECTION INTRAMUSCULAR; INTRAVENOUS at 20:32

## 2024-12-24 RX ADMIN — QUETIAPINE FUMARATE 50 MG: 25 TABLET ORAL at 20:12

## 2024-12-24 RX ADMIN — ACETYLCYSTEINE 600 MG: 200 SOLUTION ORAL; RESPIRATORY (INHALATION) at 19:27

## 2024-12-24 RX ADMIN — IPRATROPIUM BROMIDE AND ALBUTEROL SULFATE 3 ML: .5; 3 SOLUTION RESPIRATORY (INHALATION) at 07:39

## 2024-12-24 RX ADMIN — PIPERACILLIN AND TAZOBACTAM 4.5 G: 4; .5 INJECTION, POWDER, FOR SOLUTION INTRAVENOUS at 09:00

## 2024-12-24 NOTE — RESPIRATORY THERAPY NOTE
Pt rev on a [place] C3 vent setting as per flow sheet all alarms on and functional vent working properly. Vent check. Pt wrist restrains are secure. Vent plugged into red outlet

## 2024-12-24 NOTE — PROGRESS NOTES
Progress Note - Critical Care/ICU   Name: Hemanth Swanson 39 y.o. male I MRN: [id number]  Unit/Bed#: ICU 05 I Date of Admission: 12/16/2024   Date of Service: 12/24/2024 I Hospital Day: 8      Assessment & Plan  Acute on chronic respiratory failure with hypoxia and hypercapnia (HCC)  S/p trach for NM disease.  Baseline Vent dependent  Trach 6XL  History of significant mucous plugs  CT chest revealing complete collapse of the left lower lobe and severe atelectasis of the right lower lobe similar to prior examination. Superimposed aspiration or pneumonia cannot be excluded. Trace left pleural effusion.     Plan:  Continue ACVC 14/500/40/8 (PEEP 6 at home, otherwise same home settings)   Aggressive pulmonary hygiene   Mucomyst, duo-nebs q6h  Chest therapy  Frequent suctioning/turning  Antibiotics as above  F/u OP with Pulmonology   Toxic metabolic encephalopathy  Patient with history of waxing and waning agitation   Required 8 mg IM Versed + 2 mg IV versed at SLE  CT negative  UDS + THC, benzos   MRI during previous admission revealed images consistent with anoxic brain injury  Hypernatremia now corrected    Plan:  Precedex for RASS 0   Responds well to precedex, consider starting clonidine   Started on seroquel 25mg BID, increased to 50mg BID yesterday. PEG tube clotted in the afternoon therefore seroquel was unable to be given @ HS and he was given 10mg SL Zyprexa   PRN ativan 2mg [id number] x 1 overnight   Delirium precautions  CAM ICU, fall precautions  Neuro checks per protocol   EEG ordered   Neurology consulted, appreciate recs   Sepsis (HCC)  Tachypnea, leukocytosis. HD stable. Lactic 3.1-> 1  ED course: 1L Isolyte, Zosyn, Vanco  Source: Pulmonary  CT chest: complete collapse of the left lower lobe and severe atelectasis of the right lower lobe similar to prior examination. Superimposed aspiration or pneumonia cannot be excluded. Trace left pleural effusion   Sputum culture: pseudomonas and serratia  1 of 2  BC's 12/16 + staph epi- likely contaminant  HD stable    Plan  Treatment for pneumonia as above  Trend CBC, temperature curve, procal  Pneumonia due to Pseudomonas and Serratia  12/16 CT chest revealing complete collapse of the left lower lobe and severe atelectasis of the right lower lobe similar to prior examination. Superimposed aspiration or pneumonia cannot be excluded. Trace left pleural effusion.   12/16 Sputum culture + Pseudomonas and Serratia    Plan  Zosyn Abx D 7/7   Aggressive chest PT, mucomyst/duo-neb  Rest of plan as outlined     Agitation  Family reports that Hemanth has had episodes of agitation and shaking at home   While hospitalized, he has had multiple episodes of the same. During agitation events, he holds his breath and desaturates. He also tries to hit and kick staff so now he is restrained for safety. He denies pain, respiratory distress during these episodes. Minimal secretions suctioned. No clear prompting events lead to agitation  Seroquel 25 BID started on 12/21, increased to 50mg BID 12/23  Precedex 1mcg, titrate for RASS 0  Ativan 2mg IV q4h PRN, received x 1 overnight   Psychiatry consulted  Consider started SSRI per neuro recs  Mixed axonal-demyelinating polyneuropathy  Trach and PEG in place  PT/OT  S/P percutaneous endoscopic gastrostomy (PEG) tube placement (HCC)  PEG tube re-placed 12/10 after self dislodgment  PEG tube clogged yesterday   IR consulted   History of pulmonary embolus (PE)  Continue Eliquis   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HCC)  S/p resection and chemo  Umbilical hernia without obstruction and without gangrene  Chronic, deemed not a surgical candidate  Mood disorder (HCC)  Consider starting SSRI  PRN ativan   Abnormal movements  EEG ordered but there is low suspicion for seizure activity  This is a chronic issue and had previously prolonged EEG in October during these episodes which was negative for seizure activity  PRN ativan, precedex infusion   Neurology consulted    Disposition: Critical care    ICU Core Measures     Vented Patient  VAP Bundle  VAP bundle ordered     A: Assess, Prevent, and Manage Pain Has pain been assessed? Yes  Need for changes to pain regimen? No   B: Both Spontaneous Awakening Trials (SATs) and Spontaneous Breathing Trials (SBTs) Plan to perform spontaneous awakening trial today? Chronic vent   Plan to perform spontaneous breathing trial today? Chronic vent   Obvious barriers to extubation? NA   C: Choice of Sedation RASS Goal: 0 Alert and Calm  Need for changes to sedation or analgesia regimen? No   D: Delirium CAM-ICU: Unable to perform secondary to Dementia or other chronic cognitive dysfunction   E: Early Mobility  Plan for early mobility? Yes   F: Family Engagement Plan for family engagement today? Yes       Antibiotic Review: Patient on appropriate coverage based on culture data.     Review of Invasive Devices:      Central access plan:  unaccessed port       Prophylaxis:  VTE VTE covered by:  apixaban, Per PEG Tube, 5 mg at 12/23/24 0838       Stress Ulcer  not ordered         24 Hour Events : PEG tube clogged yesterday, tube feedings and PO meds held. Pending IR consult today. Given 10mg SL Zyprexa @ HS. Precedex 1mcg. Intermittent periods of agitation and shaking.     Subjective   Review of Systems: Review of Systems not obtainable due to Altered mental status    Objective :                   Vitals I/O      Most Recent Min/Max in 24hrs   Temp 98.2 °F (36.8 °C) Temp  Min: 96.6 °F (35.9 °C)  Max: 98.4 °F (36.9 °C)   Pulse 90 Pulse  Min: 64  Max: 109   Resp 15 Resp  Min: 12  Max: 29   /79 BP  Min: 83/58  Max: 163/108   O2 Sat 93 % SpO2  Min: 82 %  Max: 99 %      Intake/Output Summary (Last 24 hours) at 12/24/2024 0258  Last data filed at 12/23/2024 2355  Gross per 24 hour   Intake 1339.6 ml   Output 875 ml   Net 464.6 ml       Diet Enteral/Parenteral; Tube Feeding No Oral Diet; Jevity 1.5; Continuous; 70; 150; Water; Every 4  hours    Invasive Monitoring   N/A         Physical Exam   Physical Exam  Eyes:      General: Lids are normal.      Extraocular Movements: Extraocular movements intact.      Conjunctiva/sclera: Conjunctivae normal.      Pupils: Pupils are equal, round, and reactive to light.   Skin:     General: Skin is warm and dry.      Capillary Refill: Capillary refill takes less than 2 seconds.   HENT:      Head: Normocephalic and atraumatic.   Neck:      Trachea: Trachea normal.      Comments: Neck tends to tilt towards left side   Cardiovascular:      Rate and Rhythm: Normal rate and regular rhythm.      Pulses: Normal pulses.           Radial pulses are 2+ on the right side and 2+ on the left side.        Dorsalis pedis pulses are 2+ on the right side and 2+ on the left side.      Heart sounds: Normal heart sounds, S1 normal and S2 normal.   Musculoskeletal:      Cervical back: Neck supple.      Comments: LE externally rotated, unable to resist against gravity. UE +4/5 strength, strong hand grasp bilaterally    Abdominal: General: Bowel sounds are normal.      Palpations: Abdomen is soft.      Hernia: A hernia is present. Hernia is present in the umbilical area.      Comments: PEG tube site C/D/I    Constitutional:       General: He is awake.      Appearance: He is ill-appearing.      Interventions: He is restrained.   Pulmonary:      Effort: Pulmonary effort is normal.      Breath sounds: Decreased breath sounds and rhonchi present.   Psychiatric:      Comments: Periods of agitation and shaking    Neurological:      General: No focal deficit present.      Mental Status: He is alert.      GCS: GCS eye subscore is 4. GCS verbal subscore is 1. GCS motor subscore is 6.          Diagnostic Studies        Imaging: CXR 12/23-Poor inspiration with bilateral pleural effusions similar to prior study. Probable underlying airspace disease bilaterally is again noted without obvious change from prior study      Medications:  Scheduled  PRN   acetylcysteine, 3 mL, Q6H  apixaban, 5 mg, BID  chlorhexidine, 15 mL, Q12H ISAEL  ipratropium-albuterol, 3 mL, Q6H  melatonin, 6 mg, HS  piperacillin-tazobactam, 4.5 g, Q8H  [Held by provider] QUEtiapine, 50 mg, BID      acetaminophen, 1,000 mg, Q8H PRN  LORazepam, 2 mg, Q4H PRN       Continuous    dexmedetomidine, 0.1-1 mcg/kg/hr, Last Rate: 1 mcg/kg/hr (12/24/24 0227)         Labs:   CBC    Recent Labs     12/22/24  0504 12/23/24  0511   WBC 10.16 12.19*   HGB 10.4* 10.3*   HCT 33.3* 32.4*    177     BMP    Recent Labs     12/22/24  0504 12/23/24  0511   SODIUM 138 137   K 4.0 4.2    101   CO2 30 32   AGAP 4 4   BUN 14 14   CREATININE 0.40* 0.39*   CALCIUM 8.7 9.0       Coags    No recent results     Additional Electrolytes  Recent Labs     12/22/24  0504 12/23/24  0511   MG 2.0 2.0   PHOS  --  3.6          Blood Gas    No recent results  No recent results LFTs  No recent results    Infectious  Recent Labs     12/23/24  0511   PROCALCITONI 0.11     Glucose  Recent Labs     12/22/24  0504 12/23/24  0511   GLUC 114 116

## 2024-12-24 NOTE — ASSESSMENT & PLAN NOTE
Patient with history of waxing and waning agitation   Required 8 mg IM Versed + 2 mg IV versed at SLE  CT negative  UDS + THC, benzos   MRI during previous admission revealed images consistent with anoxic brain injury  Hypernatremia now corrected    Plan:  Precedex for RASS 0   Responds well to precedex, consider starting clonidine   Started on seroquel 25mg BID, increased to 50mg BID yesterday. PEG tube clotted in the afternoon therefore seroquel was unable to be given @ HS and he was given 10mg SL Zyprexa   PRN ativan 2mg [id number] x 1 overnight   Delirium precautions  CAM ICU, fall precautions  Neuro checks per protocol   EEG ordered   Neurology consulted, appreciate recs

## 2024-12-24 NOTE — SPEECH THERAPY NOTE
Order for VBS received.  Await medical optimization, await input re: when to proceed.   Ruth Escobar MS CCC-SLP  [place] License [id number]

## 2024-12-24 NOTE — ASSESSMENT & PLAN NOTE
Family reports that Hemanth has had episodes of agitation and shaking at home   While hospitalized, he has had multiple episodes of the same. During agitation events, he holds his breath and desaturates. He also tries to hit and kick staff so now he is restrained for safety. He denies pain, respiratory distress during these episodes. Minimal secretions suctioned. No clear prompting events lead to agitation  Seroquel 25 BID started on 12/21, increased to 50mg BID 12/23  Precedex 1mcg, titrate for RASS 0  Ativan 2mg IV q4h PRN, received x 1 overnight   Psychiatry consulted  Consider started SSRI per neuro recs

## 2024-12-24 NOTE — ASSESSMENT & PLAN NOTE
12/16 CT chest revealing complete collapse of the left lower lobe and severe atelectasis of the right lower lobe similar to prior examination. Superimposed aspiration or pneumonia cannot be excluded. Trace left pleural effusion.   12/16 Sputum culture + Pseudomonas and Serratia    Plan  Zosyn Abx D 7/7   Aggressive chest PT, mucomyst/duo-neb  Rest of plan as outlined

## 2024-12-24 NOTE — ASSESSMENT & PLAN NOTE
EEG ordered but there is low suspicion for seizure activity  This is a chronic issue and had previously prolonged EEG in October during these episodes which was negative for seizure activity  PRN ativan, precedex infusion   Neurology consulted

## 2024-12-24 NOTE — PHYSICAL THERAPY NOTE
PT TREATMENT     12/24/24 7869   PT Last Visit   PT Visit Date 12/24/24   Note Type   Note Type Treatment   Pain Assessment   Pain Assessment Tool Choi-Baker FACES   Choi-Baker FACES Pain Rating 0   Restrictions/Precautions   Other Precautions Cognitive;Chair Alarm;Bed Alarm;Multiple lines;O2;Fall Risk  (in ICU with trach on vent and with PEG tube)   General   Chart Reviewed Yes   Family/Caregiver Present No   Cognition   Arousal/Participation Lethargic   Comments patient lethargic/ mildly sedated in ICU with past episodes of agitation as per nursing   Subjective   Subjective nonverbal on vent with trach.   Bed Mobility   Additional Comments unable to assess sitting/OOB activity due to medical status/sedation/lethargy   Exercises   Hamstring Stretch Supine;5 reps;PROM;Bilateral   Hip Flexion Supine;10 reps;AAROM;AROM;Bilateral   Hip Abduction Supine;10 reps;AAROM;AROM;Bilateral   Knee Flexion Stretch Supine;5 reps;PROM;Bilateral   Heel Cord Stretch Supine;5 reps;PROM;Bilateral   Marching Supine;5 reps;PROM;Bilateral;AAROM   Neuro re-ed PNF patterning utilized for BLEs all ranges all major joints 5-10 reps bilaterally   Assessment   Assessment Patient tolerating bedside exercise/ROM well with limited participation at this time. Patient will benefit from trial of PT to determine benefit and possibility of sitting on bed edge to assist with transfers. Patient will benefit from continued PT with progression as tolerated and increasing functional mobility with clinical staff as tolerated and medically appropriate. The patient's AM-PAC Basic Mobility Inpatient Short Form Raw Score is 6. A Raw score of less than or equal to 16 suggests the patient may benefit from discharge to post-acute rehabilitation services. Please also refer to the recommendation of the Physical Therapist for safe discharge planning.         Plan   Treatment/Interventions ADL retraining;Functional transfer training;LE strengthening/ROM;Therapeutic  exercise;Endurance training;Patient/family training;Gait training;Bed mobility;Equipment eval/education;Compensatory technique education   PT Frequency Other (Comment)  (1x/week)   Discharge Recommendation   Rehab Resource Intensity Level, PT III (Minimum Resource Intensity)   Additional Comments return home with 24 hour care, all DME in place prior to admission including nicky lift   AM-PAC Basic Mobility Inpatient   Turning in Flat Bed Without Bedrails 1   Lying on Back to Sitting on Edge of Flat Bed Without Bedrails 1   Moving Bed to Chair 1   Standing Up From Chair Using Arms 1   Walk in Room 1   Climb 3-5 Stairs With Railing 1   Basic Mobility Inpatient Raw Score 6   Turning Head Towards Sound 2   Follow Simple Instructions 2   Low Function Basic Mobility Raw Score  10   Low Function Basic Mobility Standardized Score  14.65   [facility] Highest Level Of Mobility   -HLM Goal 2: Bed activities/Dependent transfer   -HLM Achieved 2: Bed activities/Dependent transfer   Education   Patient Reinforcement needed   Licensure   [place] License Number  Maureen Marianne PT [id number]        yes

## 2024-12-24 NOTE — QUICK NOTE
Update give sister Dmitry via telephone.  All questions answered and she confirmed she wants the video swallow prior to discharge and that she still wants to take him home.  Video swallow is scheduled for Thursday 12/26.

## 2024-12-25 PROBLEM — A41.9 SEPSIS (HCC): Status: RESOLVED | Noted: 2023-08-24 | Resolved: 2024-12-25

## 2024-12-25 PROBLEM — J15.1 PNEUMONIA DUE TO PSEUDOMONAS (HCC): Status: RESOLVED | Noted: 2023-09-24 | Resolved: 2024-12-25

## 2024-12-25 LAB
ANION GAP SERPL CALCULATED.3IONS-SCNC: 10 MMOL/L (ref 4–13)
BASOPHILS # BLD AUTO: 0.03 THOUSANDS/ÂΜL (ref 0–0.1)
BASOPHILS NFR BLD AUTO: 0 % (ref 0–1)
BUN SERPL-MCNC: 20 MG/DL (ref 5–25)
CALCIUM SERPL-MCNC: 8.4 MG/DL (ref 8.4–10.2)
CHLORIDE SERPL-SCNC: 105 MMOL/L (ref 96–108)
CO2 SERPL-SCNC: 29 MMOL/L (ref 21–32)
CREAT SERPL-MCNC: 0.71 MG/DL (ref 0.6–1.3)
EOSINOPHIL # BLD AUTO: 0.11 THOUSAND/ÂΜL (ref 0–0.61)
EOSINOPHIL NFR BLD AUTO: 1 % (ref 0–6)
ERYTHROCYTE [DISTWIDTH] IN BLOOD BY AUTOMATED COUNT: 15.4 % (ref 11.6–15.1)
GFR SERPL CREATININE-BSD FRML MDRD: 119 ML/MIN/1.73SQ M
GLUCOSE SERPL-MCNC: 90 MG/DL (ref 65–140)
HCT VFR BLD AUTO: 32 % (ref 36.5–49.3)
HGB BLD-MCNC: 9.8 G/DL (ref 12–17)
IMM GRANULOCYTES # BLD AUTO: 0.06 THOUSAND/UL (ref 0–0.2)
IMM GRANULOCYTES NFR BLD AUTO: 1 % (ref 0–2)
LYMPHOCYTES # BLD AUTO: 1.15 THOUSANDS/ÂΜL (ref 0.6–4.47)
LYMPHOCYTES NFR BLD AUTO: 11 % (ref 14–44)
MAGNESIUM SERPL-MCNC: 2.4 MG/DL (ref 1.9–2.7)
MCH RBC QN AUTO: 30.4 PG (ref 26.8–34.3)
MCHC RBC AUTO-ENTMCNC: 30.6 G/DL (ref 31.4–37.4)
MCV RBC AUTO: 99 FL (ref 82–98)
MONOCYTES # BLD AUTO: 0.9 THOUSAND/ÂΜL (ref 0.17–1.22)
MONOCYTES NFR BLD AUTO: 9 % (ref 4–12)
NEUTROPHILS # BLD AUTO: 8.06 THOUSANDS/ÂΜL (ref 1.85–7.62)
NEUTS SEG NFR BLD AUTO: 78 % (ref 43–75)
NRBC BLD AUTO-RTO: 0 /100 WBCS
PHOSPHATE SERPL-MCNC: 4.6 MG/DL (ref 2.7–4.5)
PLATELET # BLD AUTO: 182 THOUSANDS/UL (ref 149–390)
PMV BLD AUTO: 11 FL (ref 8.9–12.7)
POTASSIUM SERPL-SCNC: 4.6 MMOL/L (ref 3.5–5.3)
RBC # BLD AUTO: 3.22 MILLION/UL (ref 3.88–5.62)
SODIUM SERPL-SCNC: 144 MMOL/L (ref 135–147)
WBC # BLD AUTO: 10.31 THOUSAND/UL (ref 4.31–10.16)

## 2024-12-25 PROCEDURE — 94150 VITAL CAPACITY TEST: CPT

## 2024-12-25 PROCEDURE — 94664 DEMO&/EVAL PT USE INHALER: CPT

## 2024-12-25 PROCEDURE — 94760 N-INVAS EAR/PLS OXIMETRY 1: CPT

## 2024-12-25 PROCEDURE — 84100 ASSAY OF PHOSPHORUS: CPT | Performed by: NURSE PRACTITIONER

## 2024-12-25 PROCEDURE — 99232 SBSQ HOSP IP/OBS MODERATE 35: CPT | Performed by: ANESTHESIOLOGY

## 2024-12-25 PROCEDURE — 80048 BASIC METABOLIC PNL TOTAL CA: CPT | Performed by: NURSE PRACTITIONER

## 2024-12-25 PROCEDURE — 94003 VENT MGMT INPAT SUBQ DAY: CPT

## 2024-12-25 PROCEDURE — 94669 MECHANICAL CHEST WALL OSCILL: CPT

## 2024-12-25 PROCEDURE — 94640 AIRWAY INHALATION TREATMENT: CPT

## 2024-12-25 PROCEDURE — 83735 ASSAY OF MAGNESIUM: CPT | Performed by: NURSE PRACTITIONER

## 2024-12-25 PROCEDURE — 85025 COMPLETE CBC W/AUTO DIFF WBC: CPT | Performed by: NURSE PRACTITIONER

## 2024-12-25 PROCEDURE — 94668 MNPJ CHEST WALL SBSQ: CPT

## 2024-12-25 RX ORDER — ALBUMIN HUMAN 50 G/1000ML
12.5 SOLUTION INTRAVENOUS ONCE
Status: COMPLETED | OUTPATIENT
Start: 2024-12-25 | End: 2024-12-25

## 2024-12-25 RX ORDER — QUETIAPINE FUMARATE 100 MG/1
100 TABLET, FILM COATED ORAL 2 TIMES DAILY
Status: DISCONTINUED | OUTPATIENT
Start: 2024-12-25 | End: 2024-12-25

## 2024-12-25 RX ORDER — SODIUM CHLORIDE, SODIUM GLUCONATE, SODIUM ACETATE, POTASSIUM CHLORIDE, MAGNESIUM CHLORIDE, SODIUM PHOSPHATE, DIBASIC, AND POTASSIUM PHOSPHATE .53; .5; .37; .037; .03; .012; .00082 G/100ML; G/100ML; G/100ML; G/100ML; G/100ML; G/100ML; G/100ML
500 INJECTION, SOLUTION INTRAVENOUS ONCE
Status: COMPLETED | OUTPATIENT
Start: 2024-12-25 | End: 2024-12-25

## 2024-12-25 RX ORDER — QUETIAPINE FUMARATE 100 MG/1
100 TABLET, FILM COATED ORAL DAILY
Status: DISCONTINUED | OUTPATIENT
Start: 2024-12-26 | End: 2024-12-27 | Stop reason: HOSPADM

## 2024-12-25 RX ORDER — QUETIAPINE FUMARATE 100 MG/1
100 TABLET, FILM COATED ORAL
Status: DISCONTINUED | OUTPATIENT
Start: 2024-12-25 | End: 2024-12-26

## 2024-12-25 RX ADMIN — ALBUMIN (HUMAN) 12.5 G: 12.5 INJECTION, SOLUTION INTRAVENOUS at 00:19

## 2024-12-25 RX ADMIN — IPRATROPIUM BROMIDE AND ALBUTEROL SULFATE 3 ML: .5; 3 SOLUTION RESPIRATORY (INHALATION) at 07:50

## 2024-12-25 RX ADMIN — APIXABAN 5 MG: 5 TABLET, FILM COATED ORAL at 08:28

## 2024-12-25 RX ADMIN — CHLORHEXIDINE GLUCONATE 15 ML: 1.2 RINSE ORAL at 21:21

## 2024-12-25 RX ADMIN — ACETYLCYSTEINE 600 MG: 200 SOLUTION ORAL; RESPIRATORY (INHALATION) at 19:36

## 2024-12-25 RX ADMIN — ACETYLCYSTEINE 600 MG: 200 SOLUTION ORAL; RESPIRATORY (INHALATION) at 02:10

## 2024-12-25 RX ADMIN — QUETIAPINE FUMARATE 50 MG: 25 TABLET ORAL at 08:28

## 2024-12-25 RX ADMIN — CHLORHEXIDINE GLUCONATE 15 ML: 1.2 RINSE ORAL at 08:28

## 2024-12-25 RX ADMIN — IPRATROPIUM BROMIDE AND ALBUTEROL SULFATE 3 ML: .5; 3 SOLUTION RESPIRATORY (INHALATION) at 13:07

## 2024-12-25 RX ADMIN — IPRATROPIUM BROMIDE AND ALBUTEROL SULFATE 3 ML: .5; 3 SOLUTION RESPIRATORY (INHALATION) at 19:36

## 2024-12-25 RX ADMIN — ACETYLCYSTEINE 600 MG: 200 SOLUTION ORAL; RESPIRATORY (INHALATION) at 07:50

## 2024-12-25 RX ADMIN — APIXABAN 5 MG: 5 TABLET, FILM COATED ORAL at 17:41

## 2024-12-25 RX ADMIN — PIPERACILLIN AND TAZOBACTAM 4.5 G: 4; .5 INJECTION, POWDER, FOR SOLUTION INTRAVENOUS at 00:06

## 2024-12-25 RX ADMIN — SODIUM CHLORIDE, SODIUM GLUCONATE, SODIUM ACETATE, POTASSIUM CHLORIDE, MAGNESIUM CHLORIDE, SODIUM PHOSPHATE, DIBASIC, AND POTASSIUM PHOSPHATE 500 ML: .53; .5; .37; .037; .03; .012; .00082 INJECTION, SOLUTION INTRAVENOUS at 01:28

## 2024-12-25 RX ADMIN — IPRATROPIUM BROMIDE AND ALBUTEROL SULFATE 3 ML: .5; 3 SOLUTION RESPIRATORY (INHALATION) at 02:10

## 2024-12-25 RX ADMIN — ALBUMIN (HUMAN) 12.5 G: 12.5 INJECTION, SOLUTION INTRAVENOUS at 22:42

## 2024-12-25 RX ADMIN — QUETIAPINE FUMARATE 100 MG: 100 TABLET ORAL at 21:21

## 2024-12-25 RX ADMIN — ACETYLCYSTEINE 600 MG: 200 SOLUTION ORAL; RESPIRATORY (INHALATION) at 13:07

## 2024-12-25 RX ADMIN — PIPERACILLIN AND TAZOBACTAM 4.5 G: 4; .5 INJECTION, POWDER, FOR SOLUTION INTRAVENOUS at 07:38

## 2024-12-25 RX ADMIN — Medication 6 MG: at 21:21

## 2024-12-25 NOTE — ASSESSMENT & PLAN NOTE
Tachypnea, leukocytosis. HD stable. Lactic 3.1-> 1  ED course: 1L Isolyte, Zosyn, Vanco  Source: Pulmonary  CT chest: complete collapse of the left lower lobe and severe atelectasis of the right lower lobe similar to prior examination. Superimposed aspiration or pneumonia cannot be excluded. Trace left pleural effusion   Sputum culture: pseudomonas and serratia  1 of 2 BC's 12/16 + staph epi- likely contaminant    Plan  Trend CBC, temperature curve, procal

## 2024-12-25 NOTE — ASSESSMENT & PLAN NOTE
S/p trach for NM disease.  Baseline Vent dependent  Trach 6XL  History of significant mucous plugs  Completed 7 days of ABX for psuedomonoas pna     Plan:  Continue vent support  Aggressive pulmonary hygiene   Mucomyst, duo-nebs q6h  F/u OP with Pulmonology

## 2024-12-25 NOTE — ASSESSMENT & PLAN NOTE
Patient with history of waxing and waning agitation   Required 8 mg IM Versed + 2 mg IV versed at SLE  CT negative  UDS + THC, benzos   MRI during previous admission revealed images consistent with anoxic brain injury  Hypernatremia now corrected    Plan:  Seroquel 50 BID

## 2024-12-25 NOTE — ASSESSMENT & PLAN NOTE
12/16 CT chest revealing complete collapse of the left lower lobe and severe atelectasis of the right lower lobe similar to prior examination. Superimposed aspiration or pneumonia cannot be excluded. Trace left pleural effusion.   12/16 Sputum culture + Pseudomonas and Serratia  12/24 completed 7 days of ABX

## 2024-12-25 NOTE — PROGRESS NOTES
Progress Note - Critical Care/ICU   Name: Hemanth Swanson 39 y.o. male I MRN: [id number]  Unit/Bed#: ICU 05 I Date of Admission: 12/16/2024   Date of Service: 12/24/2024 I Hospital Day: 8       Interval Events:   Patient with tachycardia and agitation at beginning of shift. Patient continued with worsening agitation, kicking the bed and spitting at staff, in addition, worsening tachycardia with rates into 140s.     Patient was medicated with PRN ativan dose.     Subsequently patient developed severe hypotension with SBP going from 159 > 50. STAT POC ABG was obtained without evidence of acute derangement. 500 of albumin ordered.     Assessment and Plan  - Hypotension   Administer 500 mL Albumin  POC ABG reviewed   CBC without drop in hemoglobin   LA WNL  BMP pending  Decrease PRN ativan dose     AMBER Mei

## 2024-12-25 NOTE — ASSESSMENT & PLAN NOTE
Family reports that Hemanth has had episodes of agitation and shaking at home   While hospitalized, he has had multiple episodes of the same. During agitation events, he holds his breath and desaturates. He also tries to hit ,kick and spit at staff  Psych consulted    Plan:  Continue up-titrated Seroquel

## 2024-12-25 NOTE — PROGRESS NOTES
Progress Note - Critical Care/ICU   Name: Hemanth Swanson 39 y.o. male I MRN: [id number]  Unit/Bed#: ICU 05 I Date of Admission: 12/16/2024   Date of Service: 12/24/2024 I Hospital Day: 8       Assessment & Plan  Acute on chronic respiratory failure with hypoxia and hypercapnia (HCC)  S/p trach for NM disease.  Baseline Vent dependent  Trach 6XL  History of significant mucous plugs  Completed 7 days of ABX for psuedomonoas pna     Plan:  Continue vent support  Aggressive pulmonary hygiene   Mucomyst, duo-nebs q6h  F/u OP with Pulmonology   Toxic metabolic encephalopathy  Patient with history of waxing and waning agitation   Required 8 mg IM Versed + 2 mg IV versed at SLE  CT negative  UDS + THC, benzos   MRI during previous admission revealed images consistent with anoxic brain injury  Hypernatremia now corrected    Plan:  Seroquel 50 BID   Sepsis (HCC)  Tachypnea, leukocytosis. HD stable. Lactic 3.1-> 1  ED course: 1L Isolyte, Zosyn, Vanco  Source: Pulmonary  CT chest: complete collapse of the left lower lobe and severe atelectasis of the right lower lobe similar to prior examination. Superimposed aspiration or pneumonia cannot be excluded. Trace left pleural effusion   Sputum culture: pseudomonas and serratia  1 of 2 BC's 12/16 + staph epi- likely contaminant    Plan  Trend CBC, temperature curve, procal  Pneumonia due to Pseudomonas and Serratia  12/16 CT chest revealing complete collapse of the left lower lobe and severe atelectasis of the right lower lobe similar to prior examination. Superimposed aspiration or pneumonia cannot be excluded. Trace left pleural effusion.   12/16 Sputum culture + Pseudomonas and Serratia  12/24 completed 7 days of ABX    Abnormal movements  EEG ordered but there is low suspicion for seizure activity  This is a chronic issue and had previously prolonged EEG in October during these episodes which was negative for seizure activity  PRN ativan, precedex infusion   Neurology consulted    Agitation  Family reports that Hemanth has had episodes of agitation and shaking at home   While hospitalized, he has had multiple episodes of the same. During agitation events, he holds his breath and desaturates. He also tries to hit ,kick and spit at staff  Psych consulted    Plan:  Continue up-titrated Seroquel   Mood disorder (HCC)  See above  Mixed axonal-demyelinating polyneuropathy  Trach and PEG in place  PT/OT  S/P percutaneous endoscopic gastrostomy (PEG) tube placement (HCC)  PEG tube re-placed 12/10 after self dislodgment  History of pulmonary embolus (PE)  Continue Eliquis   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HCC)  S/p resection and chemo  Umbilical hernia without obstruction and without gangrene  Chronic, deemed not a surgical candidate    Disposition: Stepdown Level 1    ICU Core Measures     Vented Patient  VAP Bundle  VAP bundle ordered     A: Assess, Prevent, and Manage Pain Has pain been assessed? Yes  Need for changes to pain regimen? No   B: Both Spontaneous Awakening Trials (SATs) and Spontaneous Breathing Trials (SBTs) Plan to perform spontaneous awakening trial today? Chronic vent   Plan to perform spontaneous breathing trial today? Chronic vent   Obvious barriers to extubation? NA   C: Choice of Sedation RASS Goal: 0 Alert and Calm or N/A patient not on sedation  Need for changes to sedation or analgesia regimen? NA   D: Delirium CAM-ICU: Negative   E: Early Mobility  Plan for early mobility? NA   F: Family Engagement Plan for family engagement today? Yes       Antibiotic Review: completed    Review of Invasive Devices:      Central access plan:  n/a      Prophylaxis:  VTE VTE covered by:  apixaban, Per PEG Tube, 5 mg at 12/24/24 1729       Stress Ulcer  not ordered         24 Hour Events : Completed day 7 of antibiotics. Seroquel up-titrated to 50 mg BID. Hypotensive and tachycardiac overnight; fluid responsive. Received 250 Albumin x3.     Subjective   Review of Systems: See HPI for Review of  Systems    Objective :                   Vitals I/O      Most Recent Min/Max in 24hrs   Temp 99 °F (37.2 °C) Temp  Min: 97.8 °F (36.6 °C)  Max: 99 °F (37.2 °C)   Pulse (!) 140 Pulse  Min: 78  Max: 140   Resp (!) 140 Resp  Min: 14  Max: 140   /76 BP  Min: 87/53  Max: 165/106   O2 Sat 98 % SpO2  Min: 91 %  Max: 99 %      Intake/Output Summary (Last 24 hours) at 12/24/2024 2106  Last data filed at 12/24/2024 1901  Gross per 24 hour   Intake 1472.14 ml   Output 150 ml   Net 1322.14 ml       Diet Enteral/Parenteral; Tube Feeding No Oral Diet; Jevity 1.5; Continuous; 70; 150; Water; Every 4 hours    Invasive Monitoring           Physical Exam   Physical Exam  Vitals reviewed.   Skin:     General: Skin is warm and dry.      Coloration: Skin is pale.   HENT:      Mouth/Throat:      Mouth: Mucous membranes are dry.   Neck:      Trachea: Tracheostomy present.   Cardiovascular:      Rate and Rhythm: Regular rhythm. Tachycardia present.      Pulses: Normal pulses.   Musculoskeletal:      Right lower leg: No edema.      Left lower leg: No edema.   Abdominal:      Palpations: Abdomen is soft.   Constitutional:       Appearance: He is ill-appearing.      Interventions: He is restrained.   Pulmonary:      Breath sounds: No wheezing or rales.   Neurological:      Mental Status: He is agitated.          Diagnostic Studies        Lab Results: I have reviewed the following results:     Medications:  Scheduled PRN   acetylcysteine, 3 mL, Q6H  apixaban, 5 mg, BID  chlorhexidine, 15 mL, Q12H ISAEL  ipratropium-albuterol, 3 mL, Q6H  melatonin, 6 mg, HS  piperacillin-tazobactam, 4.5 g, Q8H  QUEtiapine, 50 mg, BID      acetaminophen, 1,000 mg, Q8H PRN  LORazepam, 2 mg, Q4H PRN  polyethylene glycol, 17 g, Daily PRN       Continuous    dexmedetomidine, 0.1-1 mcg/kg/hr, Last Rate: Stopped (12/24/24 1400)         Labs:   CBC    Recent Labs     12/23/24  0511   WBC 12.19*   HGB 10.3*   HCT 32.4*        BMP    Recent Labs      12/23/24  0511   SODIUM 137   K 4.2      CO2 32   AGAP 4   BUN 14   CREATININE 0.39*   CALCIUM 9.0       Coags    No recent results     Additional Electrolytes  Recent Labs     12/23/24  0511   MG 2.0   PHOS 3.6          Blood Gas    No recent results  No recent results LFTs  No recent results    Infectious  Recent Labs     12/23/24  0511   PROCALCITONI 0.11     Glucose  Recent Labs     12/23/24  0511   GLUC 116

## 2024-12-26 ENCOUNTER — APPOINTMENT (INPATIENT)
Dept: RADIOLOGY | Facility: HOSPITAL | Age: 39
DRG: 137 | End: 2024-12-26
Payer: COMMERCIAL

## 2024-12-26 LAB
AFP-TM SERPL-MCNC: 1.98 NG/ML (ref 0–9)
FSH SERPL-ACNC: 0.6 MIU/ML (ref 1.3–19.3)
HCG-TM SERPL-SCNC: <0.6 MLU/ML
PROLACTIN SERPL-MCNC: 37.59 NG/ML (ref 2.64–13.13)
PSA SERPL-MCNC: 0.77 NG/ML (ref 0–4)

## 2024-12-26 PROCEDURE — 84403 ASSAY OF TOTAL TESTOSTERONE: CPT | Performed by: NURSE PRACTITIONER

## 2024-12-26 PROCEDURE — 74230 X-RAY XM SWLNG FUNCJ C+: CPT

## 2024-12-26 PROCEDURE — 82105 ALPHA-FETOPROTEIN SERUM: CPT | Performed by: NURSE PRACTITIONER

## 2024-12-26 PROCEDURE — 84146 ASSAY OF PROLACTIN: CPT | Performed by: NURSE PRACTITIONER

## 2024-12-26 PROCEDURE — 84702 CHORIONIC GONADOTROPIN TEST: CPT | Performed by: NURSE PRACTITIONER

## 2024-12-26 PROCEDURE — 99233 SBSQ HOSP IP/OBS HIGH 50: CPT | Performed by: PSYCHIATRY & NEUROLOGY

## 2024-12-26 PROCEDURE — 94003 VENT MGMT INPAT SUBQ DAY: CPT

## 2024-12-26 PROCEDURE — 84402 ASSAY OF FREE TESTOSTERONE: CPT | Performed by: NURSE PRACTITIONER

## 2024-12-26 PROCEDURE — 94760 N-INVAS EAR/PLS OXIMETRY 1: CPT

## 2024-12-26 PROCEDURE — 94640 AIRWAY INHALATION TREATMENT: CPT

## 2024-12-26 PROCEDURE — 94668 MNPJ CHEST WALL SBSQ: CPT

## 2024-12-26 PROCEDURE — 94150 VITAL CAPACITY TEST: CPT

## 2024-12-26 PROCEDURE — 92611 MOTION FLUOROSCOPY/SWALLOW: CPT

## 2024-12-26 PROCEDURE — 99232 SBSQ HOSP IP/OBS MODERATE 35: CPT | Performed by: ANESTHESIOLOGY

## 2024-12-26 PROCEDURE — 94664 DEMO&/EVAL PT USE INHALER: CPT

## 2024-12-26 PROCEDURE — 92526 ORAL FUNCTION THERAPY: CPT

## 2024-12-26 PROCEDURE — G0103 PSA SCREENING: HCPCS | Performed by: NURSE PRACTITIONER

## 2024-12-26 PROCEDURE — 83001 ASSAY OF GONADOTROPIN (FSH): CPT | Performed by: NURSE PRACTITIONER

## 2024-12-26 PROCEDURE — 94669 MECHANICAL CHEST WALL OSCILL: CPT

## 2024-12-26 RX ORDER — QUETIAPINE FUMARATE 25 MG/1
50 TABLET, FILM COATED ORAL ONCE
Status: COMPLETED | OUTPATIENT
Start: 2024-12-26 | End: 2024-12-26

## 2024-12-26 RX ADMIN — QUETIAPINE FUMARATE 50 MG: 25 TABLET ORAL at 22:29

## 2024-12-26 RX ADMIN — APIXABAN 5 MG: 5 TABLET, FILM COATED ORAL at 08:03

## 2024-12-26 RX ADMIN — IPRATROPIUM BROMIDE AND ALBUTEROL SULFATE 3 ML: .5; 3 SOLUTION RESPIRATORY (INHALATION) at 01:58

## 2024-12-26 RX ADMIN — Medication 6 MG: at 21:27

## 2024-12-26 RX ADMIN — QUETIAPINE FUMARATE 100 MG: 100 TABLET ORAL at 21:27

## 2024-12-26 RX ADMIN — ACETYLCYSTEINE 600 MG: 200 SOLUTION ORAL; RESPIRATORY (INHALATION) at 01:58

## 2024-12-26 RX ADMIN — ACETYLCYSTEINE 600 MG: 200 SOLUTION ORAL; RESPIRATORY (INHALATION) at 07:42

## 2024-12-26 RX ADMIN — IPRATROPIUM BROMIDE AND ALBUTEROL SULFATE 3 ML: .5; 3 SOLUTION RESPIRATORY (INHALATION) at 13:00

## 2024-12-26 RX ADMIN — ACETYLCYSTEINE 600 MG: 200 SOLUTION ORAL; RESPIRATORY (INHALATION) at 20:06

## 2024-12-26 RX ADMIN — CHLORHEXIDINE GLUCONATE 15 ML: 1.2 RINSE ORAL at 21:27

## 2024-12-26 RX ADMIN — QUETIAPINE FUMARATE 100 MG: 100 TABLET ORAL at 09:00

## 2024-12-26 RX ADMIN — IPRATROPIUM BROMIDE AND ALBUTEROL SULFATE 3 ML: .5; 3 SOLUTION RESPIRATORY (INHALATION) at 07:42

## 2024-12-26 RX ADMIN — ACETYLCYSTEINE 600 MG: 200 SOLUTION ORAL; RESPIRATORY (INHALATION) at 13:00

## 2024-12-26 RX ADMIN — IPRATROPIUM BROMIDE AND ALBUTEROL SULFATE 3 ML: .5; 3 SOLUTION RESPIRATORY (INHALATION) at 20:06

## 2024-12-26 RX ADMIN — APIXABAN 5 MG: 5 TABLET, FILM COATED ORAL at 17:18

## 2024-12-26 RX ADMIN — CHLORHEXIDINE GLUCONATE 15 ML: 1.2 RINSE ORAL at 08:03

## 2024-12-26 NOTE — ASSESSMENT & PLAN NOTE
Patient with history of waxing and waning agitation   CT negative  UDS + THC, benzos   MRI during previous admission revealed images consistent with anoxic brain injury  Hypernatremia now corrected  12/23: Seroquel started  12/24: Seroquel increased  12/25: Seroquel increased    Plan:  Seroquel 100 BID   Consider more advance Neurological foundation  Consider LTACH facility for discharge

## 2024-12-26 NOTE — ASSESSMENT & PLAN NOTE
Family reports that Hemanth has had episodes of agitation and shaking at home   While hospitalized, he has had multiple episodes of the same. During agitation events, he holds his breath and desaturates. He also tries to hit ,kick and spit at staff  Psych consulted    Plan:  Continue up-titrated Seroquel   See above

## 2024-12-26 NOTE — CASE MANAGEMENT
Case Management Discharge Planning Note    Patient name Hemanth Swanson  Location ICU 05/ICU 05 MRN [id number]  : 1985 Date 2024       Current Admission Date: 2024  Current Admission Diagnosis:Acute on chronic respiratory failure with hypoxia and hypercapnia (HCC)   Patient Active Problem List    Diagnosis Date Noted Date Diagnosed    Mood disorder (HCC) 2024     Abnormal movements 2024     Agitation 2024     History of pulmonary embolus (PE) 2024     Rash of back 10/25/2024     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HCC) 10/17/2024     Palliative care by specialist 10/11/2024     Goals of care, counseling/discussion 10/11/2024     Toxic metabolic encephalopathy 10/07/2024     Cardiac arrest due to other underlying condition ([facility]) 2024     Seizure-like activity ([facility]) 2024     Bradycardia 2024     Ventilator dependent ([facility]) 2024     Obesity hypoventilation syndrome ([facility]) 10/24/2023     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     Psychophysiological insomnia 10/18/2023     Impaired mobility 2023     Status post tracheostomy ([facility]) 2023     S/P percutaneous endoscopic gastrostomy (PEG) tube placement ([facility]) 2023     Anxiety 2023     Acute on chronic respiratory failure with hypoxia and hypercapnia ([facility]) 2023     Acute pulmonary embolism without acute cor pulmonale ([facility]) 2023     Depression 2023     History of LVH (left ventricular hypertrophy) 2023     Pure hypertriglyceridemia 2023     Unilateral vestibular schwannoma ([facility]) 2023     Port-A-Cath in place 2023     Diplopia 2023     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[facility]) 2023     Umbilical hernia without obstruction and without gangrene 12/10/2022     Tobacco use 12/10/2022     Retroperitoneal lymphadenopathy 12/10/2022       LOS (days): 10  Geometric Mean LOS (GMLOS) (days):   Days to GMLOS:     OBJECTIVE:  Risk of Unplanned Readmission  Score: 35.46     Current admission status: Inpatient   Preferred Pharmacy:   [facility]/pharmacy #[id number] - [place] - [address]  [address] [address]  Phone: [phone] Fax: [phone]    Primary Care Provider: Ela Douglas MD    Primary Insurance: [employer]  Secondary Insurance:     DISCHARGE DETAILS:    Discharge planning discussed with:: sister Wayne  Freedom of Choice: Yes  Comments - Freedom of Choice: SW following to assist with planning. Case discussed with ICU care team.  Pt's sister has expressed desire to have pt return home and physician is anticipating pt will be able to discharge tomorrow.  SW placed call to pt's sister to review plans and discuss transport. SW reviewed that [facility] has accepted pt for nursing, PT and OT services.  SW reviewed pt continues episodes of agitation and sister verbalized understanding.  Sister requested transportation be arranged so she can be home getting everything ready for pt's arrival.  SW offered to arrange transport and confirm time with sister when available.  SW will continue to follow to monitor progress and assist with transition home when ready.  CM contacted family/caregiver?: Yes  Were Treatment Team discharge recommendations reviewed with patient/caregiver?: Yes  Did patient/caregiver verbalize understanding of patient care needs?: Yes    Contacts  Patient Contacts: Dmitry Swanson (sister)  Relationship to Patient:: Family  Contact Method: Phone  Phone Number: [phone]  Reason/Outcome: Discharge Planning    Other Referral/Resources/Interventions Provided:  Interventions: HHC, Transportation  Referral Comments: SW notified [facility] of tomorrow's anticipated discharge via [employer].  Transport requested through [employer].    Treatment Team Recommendation: Home with Home Health Care  Discharge Destination Plan:: Home with Home Health Care  Transport at Discharge :  Other (Comment) (SCTU)     Number/Name of Dispatcher: [employer]  Transported by (Company and Unit #): [employer]  ETA of Transport (Date): 12/27/24  ETA of Transport (Time): 1300 (requested)

## 2024-12-26 NOTE — QUICK NOTE
Multiple urology labs pending as an outpatient ordered by Dr. Moe.  Patient's sister requested these labs be drawn now since he is here.  Dr. Cardoza said ok but explained to her that they will not be followed upon by critical care and she will have to let urology know they are completed once they are resulted. She was in agreement.

## 2024-12-26 NOTE — ASSESSMENT & PLAN NOTE
Plan:   Continue medical management  Inpatient psychiatric admission does not appear to be indicated at this time  Recommend continue to monitor for therapeutic effect, see above  Continue Seroquel 100 mg daily  Can consider increasing qhs Seroquel to 150 mg  Can also consider low dose Seroquel prn agiation   Discussed with the primary team  Recommend patient follow up with outpatient psychiatry for management of psychiatric medications.  Recommend patient follow up with PCP in the meantime for management of psychiatric medications.  Psychiatry to follow up necessary.  Please contact with questions and updates  For after hours and weekends please contact [facility] Psychiatry Consultation role

## 2024-12-26 NOTE — SPEECH THERAPY NOTE
Speech Pathology Videofluoroscopic Swallow Study      Patient Name: Hemanth Swanson    Today's Date: 12/26/2024     Problem List  Principal Problem:    Acute on chronic respiratory failure with hypoxia and hypercapnia (HCC)  Active Problems:    Umbilical hernia without obstruction and without gangrene    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HCC)    S/P percutaneous endoscopic gastrostomy (PEG) tube placement (HCC)    Mixed axonal-demyelinating polyneuropathy    Toxic metabolic encephalopathy    History of pulmonary embolus (PE)    Agitation    Mood disorder (HCC)    Abnormal movements      Past Medical History  Past Medical History:   Diagnosis Date    Anxiety     Cancer (HCC)     Depression     Hypernatremia 10/06/2024    Migration of percutaneous endoscopic gastrostomy (PEG) tube (HCC) 09/24/2023    Psychiatric disorder     bipolar    Sepsis (HCC) 08/24/2023       Past Surgical History  Past Surgical History:   Procedure Laterality Date    IR BIOPSY LYMPH NODE  01/20/2023    IR GASTROSTOMY (G) TUBE CHECK/CHANGE/REINSERTION/UPSIZE  6/18/2024    IR GASTROSTOMY TUBE PLACEMENT  09/25/2023    IR LUMBAR PUNCTURE  09/06/2023    IR LUMBAR PUNCTURE  10/25/2024    IR PORT PLACEMENT  03/14/2023    ORCHIECTOMY Left 12/14/2022    Procedure: ORCHIECTOMY INGUINAL;  Surgeon: Flip Michael MD;  Location:  MAIN OR;  Service: Urology    PEG W/TRACHEOSTOMY PLACEMENT N/A 09/08/2023    Procedure: TRACHEOSTOMY WITH INSERTION PEG TUBE;  Surgeon: Rudy Mcmanus MD;  Location: [facility];  Service: General    TESTICLE SURGERY           General Information;  Pt is a 39 y.o. male with a PMH remarkable as stated above (please see EMR for all records including 3 previous VFSS completed with tracheostomy tube in place but not on ventilator.    He is currently admitted with acute on chronic respiratory failure with hypoxia and hypercapnia, agitation and concern for infection.  Pt seen bedside prior to VFSS to ensure cooperation and oral transfer  "(pt initially spitting etc) but after multiple explanations and reassurance, cooperation achieved (10:15-10:45)  A VFSS was recommended to assess oropharyngeal stage swallowing skills at this time (prior to re-introduction of any po substances.  Note: pt on TF plus some pureed food and thin liquid prior to admission).  In room with pt 10:50-11:50  RN Meredith, RT (Marcos) and I all transported pt to and from study.    Pt was viewed in lateral position and assessed with thin and nectar thick liquid (by teaspoon and successive cup sips), as well as tsps of honey thick liquid and puree.   Note:  filming was challenge 2* girth and positional issues (pt inability to support his head and trunk) AND ISSUES WITH SAVING OF IMAGES/problem with \"Fluoror Trigger\" (technology error and only some images with nectar thick and thin liquids saved)    Oral stage:  Moderate oral stage dysphagia.    Lip closure:  no escape during VFSS but significant drooling/loss of secretions  present before imaging (and pt able to close lips and spit with force before testing)  Bolus Transport/Lingual Motion:incomplete bolus formation and repetitive disorganized tongue motion with   Oral residue:  at least mild residue with all materials,   Tongue Control: cohesive bolus between tongue and palatal seal with initial boluses of liquid. Swallow Initiation: prompt to mildly delayed with bolus head in valleculae with puree and thick liquids.  Delayed 2* transfer and swallow to manage oral residue    Pharyngeal stage:  mild-moderate pharyngeal dysphagia with limited materials (would expect significant pharyngeal residue with more textured food\"    Soft palate elevation:  no bolus between soft palate and pharyngeal wall   Laryngeal elevation and anterior hyoid excursion: difficult to visualize but appeared to be only partial movement  Epiglottic movement:  complete    Laryngeal vestibule closure:   complete   Tongue base retraction and Pharyngeal Stripping: WFL " "with thin liquid with no pharyngeal residue immediately s/p swallow, mild residue with nectar thick, slight increase in residue with honey/mod thick liquid and and mild/mod residue with puree  PES opening: difficult to visualize with puree/honey thiclk but WFL c thin liquid.    Pharyngeal Residue:  no pharyngeal residue immediately s/p swallow with thin liquid, mild residue with nectar thick, slight increase in residue with honey/mod thick liquid and and mild/mod residue with puree.  Would expect more significant residue with more textured food    Penetration/Aspiration: No aspiration before or during the swallow. Mild risk for aspiration s/p swallow 2* episodes of oral residue with delayed 2* transfer/swallow to manage residue    Strategies and Efficacy:   -if fed, would benefit from \"pleasure feedings\" of very smooth moist puree  -would benefit from oral care after all feedings (to clear oral residue)      Esophageal stage:  Unable to assess/unable to pan down2* positioning and girth issues    Assessment Summary:    Pt presents with multiple medical issues and ?variability in MS, attention, cooperation etc).  He tolerated thinner liquids c no s/s aspiration, +s/s oral dysphagia with all materials and increasign pharyngeal residue with puree & mod thick liquids.   Note:   Preparation for testing and filming were challenging (pt with some agitation and spitting before testing.  Pt also with issues with positioning and girth.  Issues with technology and saving of images also occurred (only images of sequential sips of nectar thick and thin liquid sent to PACS). .    Functional Oral Intake Scale (FOIS)  Bhupinder Reynolds PhD, F-ALEXANDRA  (Does not include liquids)  Nothing by mouth (NPO).  Tube dependent with (at least) minimal attempts of food or liquid.  Tube dependent with consistent intake of food or liquid.  Total oral diet of a single consistency.  Total oral diet with multiple consistencies but requiring special " "preparation or compensations.  Total oral diet with multiple consistencies without special preparations, but with specific food limitations.  Total oral diet with no restriction.     Recommendations:    Continue TF for 1* nutrition and hydration.  Consider \"pleasure feedings\" of limited amounts of puree and thin liquids  Recommended Form of Medications: vis PEG  Aspiration precautions and compensatory swallowing strategies: upright posture, slow rate of feeding, and oral care after all attempts at feeding      Results Reviewed with pt and multiple staff members (CRNP, Nursing, RT)  Results to be reviewed with sister (ASAP as d/c home being planned)    Ruth Escobar MS CCC-SLP  [place] License [id number]  [place] License [id number]         "

## 2024-12-26 NOTE — ASSESSMENT & PLAN NOTE
EEG ordered but there is low suspicion for seizure activity  This is a chronic issue and had previously prolonged EEG in October during these episodes which was negative for seizure activity

## 2024-12-26 NOTE — PROGRESS NOTES
Progress Note - Behavioral Health   Hemanth Swanson 39 y.o. male MRN: [id number]  Unit/Bed#: ICU 05 Encounter: [id number]      Assessment:  Hemanth Swanson is a 39 y.o. male with past medical history of testicular cancer, mixed axonal demyelinating polyneuropathy, history of pulmonary embolus, depression, anxiety, tracheostomy with vent dependence, who presented with pneumonia and toxic metabolic encephalopathy. Patient was seen for initial psychiatric consultation by Dr. Almaguer on 12/23/24 with recommendations to start and titrate seroquel for agitation - please see note for more infomation. Per primary ICU team patient Seroquel has been up titrated to 100 mg BID and they feel that the patient's agitation has worsened since.    Patient is a very limited historian - Patient did not respond verbally.  He initially appeared to communicate with head movements and appeared to affirm having had issues with agitation and feeling that the Seroquel has not been helpful though not fully clear.  He did not appear to respond to most other subsequent questions.  He appears to exhibit psychomotor agitation- is repeatedly rocking himself in bed intermittently throughout the encounter. Patient did not appear to respond when asked about suicidal or homicidal ideation.  He appeared to be angry at times.  Inpatient psychiatric hospitalization does not appear to be indicated at this time.  Patient has only received 2 dosages of Seroquel 100 mg: last night and this morning.  Mood stabilization from Seroquel is generally associated with dosages on the higher side. There also probably needs to be more time for therapeutic effect for mood stabilization to be seen/for determination of efficacy.  Can consider increasing qhs Seroquel to 150 mg for further management of agitation/improvement of mood stabilization. Continue to monitor for therapeutic effect.    Assessment & Plan  Mood disorder (HCC)  Plan:   Continue medical  management  Inpatient psychiatric admission does not appear to be indicated at this time  Recommend continue to monitor for therapeutic effect, see above  Continue Seroquel 100 mg daily  Can consider increasing qhs Seroquel to 150 mg  Can also consider low dose Seroquel prn agiation   Discussed with the primary team  Recommend patient follow up with outpatient psychiatry for management of psychiatric medications.  Recommend patient follow up with PCP in the meantime for management of psychiatric medications.  Psychiatry to follow up necessary.  Please contact with questions and updates  For after hours and weekends please contact [facility] Psych[facility] Consultation role   Agitation  See mood disorder          Subjective:    I came to see the patient for continuity of care as requested by the primary team.  Please see initial consultation by Dr. Almaguer on 12/23/24. Per primary ICU team patient had been started on Seroquel which has been up titrated to 100 mg BID, and they feel that patient's agitation has worsened since.  Per ICU team today when patient is stimulated he attempts to hit, grab, spit, and has been resistant to hygiene care.    Patient is a very limited historian.  He is laying in bed with trach present.  He is repeatedly rocking himself in bed intermittently throughout the encounter.  Patient did not respond verbally.  He initially appeared to communicate with head movements and appeared to affirm having had issues with agitation and feeling that the Seroquel has not been helpful though not fully clear.  He did not appear to respond to most other subsequent questions.  When asked if he feels depressed patient tossed side pillow towards me.  He appeared to affirm wanting for me to leave when asked.  Patient did not appear to respond when asked about suicidal or homicidal ideation.  He appeared to be angry at times.  He did not appear to respond when asked if he was interested in medication changes.      Mental  Status Evaluation:  Appearance:  appears stated age and trach in place , overweight, laying in bed   Behavior:  psychomotor agitation   Speech:  Does not respond verbally   Mood:  Uncertain   Affect:  Blunted, angry at times   Thought Process:  Unable to assess   Thought Content:  Unable to assess   Perceptual Disturbances: Unable to assess   Risk Potential: Did not appear to respond when asked about suicidal or homicidal ideation   Sensorium:  Responds to name   Memory:  Unable to assess   Cognition:  Unable to assess   Consciousness:  Appears awake   Attention: Unable to assess   Insight:  Unable to assess   Judgment: poor   Muscle Strength and Tone: Not assessed   Motor Activity: Repeatedly rocking in bed intermittently         Medications:  Current Facility-Administered Medications   Medication Dose Route Frequency Provider Last Rate    acetaminophen  1,000 mg Intravenous Q8H PRN Julissa Spironello V, CRNP Stopped (12/23/24 3766)    acetylcysteine  3 mL Nebulization Q6H Julissa Spironello V, CRNP      apixaban  5 mg Per PEG Tube BID Carol Oroker, CRNP      chlorhexidine  15 mL Mouth/Throat Q12H [facility] Tiffani Clarke, CRNP      ipratropium-albuterol  3 mL Nebulization Q6H Julissa Spironello V, CRNP      melatonin  6 mg Oral HS Carol Oroker, CRNP      polyethylene glycol  17 g Oral Daily PRN Maureen Indy Barrios, CRNP      QUEtiapine  100 mg Oral HS Maureen Indy Barrios, CRNP      QUEtiapine  100 mg Oral Daily Maureen Indy Barrios, CRNP           Risks, benefits and possible side effects of Medications:   Unable to have discussion due to patient factors      Labs:    I have personally reviewed all pertinent laboratory/tests results.    Most Recent Labs:         Lab Results   Component Value Date     WBC 10.31 (H) 12/25/2024     RBC 3.22 (L) 12/25/2024     HGB 9.8 (L) 12/25/2024     HCT 32.0 (L) 12/25/2024      12/25/2024     RDW 15.4 (H) 12/25/2024     TOTANEUTABS 6.88 10/27/2024      NEUTROABS 8.06 (H) 12/25/2024     SODIUM 144 12/25/2024     K 4.6 12/25/2024      12/25/2024     CO2 29 12/25/2024     BUN 20 12/25/2024     CREATININE 0.71 12/25/2024     GLUC 90 12/25/2024     GLUF 89 09/26/2023     CALCIUM 8.4 12/25/2024     AST 18 12/16/2024     ALT 24 12/16/2024     ALKPHOS 95 12/16/2024     TP 7.3 12/16/2024     ALB 3.7 12/16/2024     TBILI 0.48 12/16/2024     CHOLESTEROL 183 12/10/2022     HDL 45 12/10/2022     TRIG 280 (H) 12/10/2022     LDLCALC 82 12/10/2022     NONHDLC 138 12/10/2022     AMMONIA 19 06/10/2024     IUP7FTSYANBK 2.090 12/16/2024     PREGSERUM Negative 10/11/2024     HGBA1C 5.3 11/01/2023      11/01/2023     Labs in last 72 hours:   Recent Labs     12/25/24  0512   WBC 10.31*   RBC 3.22*   HGB 9.8*   HCT 32.0*      RDW 15.4*   NEUTROABS 8.06*   SODIUM 144   K 4.6      CO2 29   BUN 20   CREATININE 0.71   GLUC 90   CALCIUM 8.4     EKG   Lab Results   Component Value Date    VENTRATE 84 12/23/2024    ATRIALRATE 84 12/23/2024    PRINT 152 12/23/2024    QRSDINT 94 12/23/2024    QTINT 360 12/23/2024    QTCINT 425 12/23/2024    PAXIS 26 12/23/2024    QRSAXIS 74 12/23/2024    TWAVEAXIS 21 12/23/2024         Bashir Craft DO  12/26/24      This note has been constructed using a voice recognition system.    There may be translation, syntax,  or grammatical errors. If you have any questions, please contact the dictating provider.

## 2024-12-26 NOTE — UTILIZATION REVIEW
Continued Stay Review    Date: 12/26/24                          Current Patient Class: Inpatient    Current Level of Care: level 1 stepdown    HPI:39 y.o. male initially admitted on 12/16/24     Assessment/Plan:   Toxic metabolic encephalopathy- waxing & waning agitation. Seroquel was increased to 100 mg BID, psyche following.  Acute on chronic respiratory failure-continue vent support, aggressive pulmonary hygiene, trach care. Hypotensive overnight, responsive to 250 ml of Albumin.    Medications:   Scheduled Medications:  acetylcysteine, 3 mL, Nebulization, Q6H  apixaban, 5 mg, Per PEG Tube, BID  chlorhexidine, 15 mL, Mouth/Throat, Q12H ISAEL  ipratropium-albuterol, 3 mL, Nebulization, Q6H  melatonin, 6 mg, Oral, HS  QUEtiapine, 100 mg, Oral, HS  QUEtiapine, 100 mg, Oral, Daily      PRN Meds:  acetaminophen, 1,000 mg, Intravenous, Q8H PRN  polyethylene glycol, 17 g, Oral, Daily PRN      Discharge Plan: tbd    Vital Signs (last 3 days)       Date/Time Temp Pulse Resp BP MAP (mmHg) SpO2 FiO2 (%) O2 Device Patient Position - Orthostatic VS [place] Coma Scale Score Pain    12/26/24 0800 -- 105 21 97/66 75 94 % -- Ventilator -- -- --    12/26/24 0744 -- -- -- -- -- -- 40 Ventilator -- -- --    12/26/24 0630 -- 84 14 116/75 91 97 % -- -- -- -- --    12/26/24 0600 -- 92 13 86/53 64 95 % -- -- -- -- --    12/26/24 0530 -- 93 14 93/54 68 94 % -- -- -- -- --    12/26/24 0500 -- 102 16 100/55 72 69 % -- -- -- -- --    12/26/24 0429 -- -- -- -- -- 93 % 40 Ventilator -- -- --    12/26/24 0400 96.4 °F (35.8 °C) 103 16 93/60 69 100 % -- Ventilator -- 11 No Pain    12/26/24 0330 -- 60 100 89/51 65 71 % -- -- -- -- --    12/26/24 0320 -- 90 20 -- -- 93 % -- -- -- -- --    12/26/24 0300 -- 77 20 77/50 60 86 % -- -- -- -- --    12/26/24 0201 -- 73 17 -- -- 98 % -- -- -- -- --    12/26/24 0200 -- 74 20 92/59 70 98 % -- -- -- -- --    12/26/24 0130 -- 76 14 100/71 82 99 % -- -- -- -- --    12/26/24 0100 -- 71 14 91/60 71 98 % -- -- --  -- --    12/26/24 0030 -- 72 14 90/63 72 -- -- -- -- -- --    12/26/24 0000 96 °F (35.6 °C) 77 14 89/61 70 96 % 40 Ventilator Lying 11 No Pain    12/25/24 2310 -- 90 30 94/59 72 94 % -- -- -- -- --    12/25/24 2300 -- 86 14 83/51 62 89 % -- -- -- -- --    12/25/24 2258 -- -- -- -- -- 92 % 40 Ventilator -- -- --    12/25/24 2200 -- 93 14 82/53 63 94 % -- -- -- -- --    12/25/24 2100 -- 94 32 92/57 69 92 % -- -- -- -- --    12/25/24 2043 98 °F (36.7 °C) -- -- -- -- -- -- -- -- -- --    12/25/24 2000 -- 97 111 100/65 77 93 % -- Ventilator Lying 11 No Pain    12/25/24 1954 -- 100 26 -- -- 100 % -- -- -- -- --    12/25/24 1939 -- 96 14 -- -- 98 % 40 Ventilator -- -- --    12/25/24 1800 -- 95 21 115/89 99 100 % -- -- -- -- --    12/25/24 1700 -- 92 14 108/73 85 98 % -- -- -- -- --    12/25/24 1600 -- 99 28 110/69 82 93 % 40 -- -- -- --    12/25/24 1542 98.2 °F (36.8 °C) -- -- -- -- -- -- -- -- -- --    12/25/24 1519 -- -- -- -- -- 94 % -- -- -- -- --    12/25/24 1500 -- 91 16 107/58 77 -- -- -- -- -- --    12/25/24 1400 -- 85 14 106/72 86 95 % -- -- -- -- --    12/25/24 1307 -- -- -- -- -- 99 % -- -- -- -- --    12/25/24 1300 -- 79 15 106/69 81 99 % -- -- -- -- --    12/25/24 1200 98 °F (36.7 °C) 89 14 96/61 73 98 % -- Ventilator Lying -- --    12/25/24 1115 -- -- -- -- -- 94 % -- -- -- -- --    12/25/24 1100 -- 75 14 101/66 80 95 % -- -- -- -- --    12/25/24 0900 -- 99 14 90/50 63 -- -- -- -- -- --    12/25/24 0800 -- 97 21 103/64 79 95 % -- Ventilator Lying 11 --    12/25/24 0750 -- -- -- -- -- 98 % -- -- -- -- --    12/25/24 0720 97.4 °F (36.3 °C) -- -- -- -- -- -- -- -- -- --    12/25/24 0700 -- 91 22 134/65 88 -- -- -- -- -- --    12/25/24 0600 -- 92 14 93/59 72 97 % -- -- -- -- --    12/25/24 0500 -- 91 14 103/63 79 96 % -- -- -- -- --    12/25/24 0430 -- 98 15 94/58 69 97 % -- -- -- -- --    12/25/24 0417 -- -- -- -- -- 97 % -- -- -- -- --    12/25/24 0400 96.8 °F (36 °C) 83 15 108/76 88 99 % -- -- Lying 11 --     12/25/24 0300 -- 98 17 89/51 64 91 % -- -- -- -- --    12/25/24 0210 -- -- -- -- -- 97 % -- -- -- -- --    12/25/24 0200 -- 82 19 104/67 81 98 % -- -- -- -- --    12/25/24 0130 -- 85 14 86/53 65 97 % -- -- -- -- --    12/25/24 0115 -- 90 14 85/50 62 97 % -- -- -- -- --    12/25/24 0100 -- 92 14 83/51 62 97 % -- -- -- -- --    12/25/24 0045 -- 99 15 82/53 63 96 % -- -- -- -- --    12/25/24 0030 -- 101 14 81/50 60 96 % -- -- -- -- --    12/25/24 0015 -- 101 14 85/54 65 97 % -- -- -- -- --    12/25/24 0000 97 °F (36.1 °C) 102 15 79/53 61 97 % -- -- Lying 11 No Pain    12/24/24 2359 -- 102 14 79/53 61 97 % -- -- -- -- --    12/24/24 2300 -- 103 14 70/46 54 98 % -- -- -- -- --    12/24/24 2245 -- 105 14 70/43 52 98 % -- -- -- -- --    12/24/24 2230 -- 107 14 74/41 52 97 % -- -- -- -- --    12/24/24 2215 -- 109 14 67/40 49 96 % -- -- -- -- --    12/24/24 2200 -- 115 14 59/35 43 96 % -- -- -- -- --    12/24/24 2145 -- 121 14 55/34 40 100 % -- -- -- -- --    12/24/24 2100 -- 128 14 50/23 30 96 % -- -- -- -- --    12/24/24 2000 96.9 °F (36.1 °C) 140 140 159/76 109 98 % -- -- -- 11 --    12/24/24 1930 -- 125 52 107/63 80 100 % -- -- -- -- --    12/24/24 1929 -- -- -- -- -- 98 % -- -- -- -- --    12/24/24 1900 -- 128 22 100/70 82 97 % -- -- -- -- --    12/24/24 1800 -- 121 28 106/68 79 98 % -- -- -- -- --    12/24/24 1700 -- 110 15 93/59 70 96 % -- -- -- -- --    12/24/24 1600 99 °F (37.2 °C) 101 51 88/57 66 91 % 40 Ventilator Lying 11 --    12/24/24 1522 -- -- -- -- -- 99 % -- -- -- -- --    12/24/24 1436 -- -- -- -- -- 91 % -- Ventilator -- -- --    12/24/24 1400 -- -- -- 87/53 63 -- -- -- -- -- --    12/24/24 1300 -- 84 14 91/54 67 98 % -- -- -- -- --    12/24/24 1200 98.5 °F (36.9 °C) 88 14 108/73 84 97 % -- Ventilator Lying 11 --    12/24/24 1100 -- 94 15 126/82 98 97 % -- -- -- -- --    12/24/24 1000 -- 92 16 147/84 106 94 % -- -- -- -- --    12/24/24 0900 -- 99 17 165/106 130 95 % -- -- -- -- --    12/24/24 0800 97.8 °F  (36.6 °C) 90 18 164/110 133 96 % -- -- Lying 11 --    12/24/24 0740 -- 80 15 -- -- 98 % -- -- -- -- --    12/24/24 0700 -- 83 14 123/80 96 97 % -- -- -- -- --    12/24/24 0600 -- 85 15 147/100 119 97 % -- -- -- -- --    12/24/24 0500 -- 90 15 118/78 92 98 % -- -- -- -- --    12/24/24 0412 -- -- -- -- -- 96 % -- -- -- -- --    12/24/24 0400 98 °F (36.7 °C) 92 16 138/90 109 96 % 40 Ventilator Lying 11 No Pain    12/24/24 0300 -- 97 14 147/93 114 96 % -- -- -- -- --    12/24/24 0200 -- 90 15 126/79 98 93 % -- -- -- -- --    12/24/24 0131 -- -- -- -- -- 96 % -- -- -- -- --    12/24/24 0100 -- 85 16 161/99 126 96 % -- -- -- -- --    12/24/24 0000 98.2 °F (36.8 °C) 81 14 114/74 90 96 % 40 Ventilator Lying 11 No Pain    12/23/24 2317 -- 86 15 -- -- 95 % -- -- -- -- --    12/23/24 2300 -- 87 15 106/58 75 95 % -- -- -- -- --    12/23/24 2200 -- 78 14 142/88 111 94 % -- -- -- -- --    12/23/24 2100 -- 95 15 99/58 72 96 % -- -- -- -- --    12/23/24 2000 98.4 °F (36.9 °C) 71 14 139/97 113 97 % 40 Ventilator Lying 11 No Pain    12/23/24 1917 -- -- -- -- -- 95 % 40 Ventilator -- -- --    12/23/24 1900 -- 69 14 138/84 106 95 % -- -- -- -- --    12/23/24 1800 -- 67 14 127/83 101 97 % -- -- -- -- --    12/23/24 1700 -- 89 16 104/63 78 96 % -- -- -- -- --    12/23/24 1617 -- 87 15 97/64 76 95 % -- -- -- -- --    12/23/24 1600 -- 105 25 83/58 64 93 % 40 Ventilator Lying 11 No Pain    12/23/24 1539 97.5 °F (36.4 °C) -- -- -- -- -- -- -- -- -- --    12/23/24 1500 -- 107 18 126/59 85 92 % -- -- -- -- --    12/23/24 1400 -- 109 20 117/66 85 90 % -- -- -- -- --    12/23/24 1324 -- -- -- -- -- 99 % -- -- -- -- --    12/23/24 1300 -- 93 21 121/80 96 99 % -- -- -- -- --    12/23/24 1200 96.8 °F (36 °C) 100 15 91/54 67 99 % 40 Ventilator Lying 11 No Pain    12/23/24 1136 96.6 °F (35.9 °C) -- -- -- -- -- -- -- -- -- --    12/23/24 1101 -- -- -- -- -- 96 % -- -- -- -- --    12/23/24 1100 -- 89 16 112/66 84 97 % -- -- -- -- --    12/23/24 1000 --  95 18 97/53 70 88 % -- -- -- -- --    12/23/24 0900 -- 73 29 91/59 70 82 % -- -- -- -- --    12/23/24 0826 -- -- -- -- -- 99 % -- -- -- -- --    12/23/24 0800 98 °F (36.7 °C) 65 15 116/76 91 99 % 40 Ventilator Lying 11 No Pain    12/23/24 0745 98 °F (36.7 °C) -- -- -- -- -- -- -- -- -- --    12/23/24 0700 -- 64 15 119/78 94 97 % -- -- -- -- --    12/23/24 0600 -- 81 15 112/57 78 97 % -- -- -- -- --    12/23/24 0500 -- 66 12 163/108 131 97 % -- -- -- -- --    12/23/24 0400 98 °F (36.7 °C) 71 14 120/84 98 97 % -- -- Lying 11 No Pain    12/23/24 0333 -- 69 14 -- -- 97 % -- -- -- -- --    12/23/24 0254 -- 69 15 -- -- 96 % 40 Ventilator -- -- --    12/23/24 0200 -- 76 14 105/68 81 95 % -- -- -- -- --    12/23/24 0100 -- 97 17 95/60 72 96 % -- -- -- -- --    12/23/24 0000 98.7 °F (37.1 °C) 85 14 104/57 77 93 % -- -- Lying 11 No Pain          Weight (last 2 days)       Date/Time Weight    12/26/24 0600 97.8 (215.61)            Pertinent Labs/Diagnostic Results:   Radiology:  XR chest portable   Final Interpretation by Atilio Chris MD (12/23 1634)      No significant interval change since 12/20/2024            Workstation performed: [id number]         XR chest portable   Final Interpretation by Jose Briones MD (12/20 1337)      Poor inspiration. Congestive changes and effusion suggested..            Workstation performed: [id number]         XR chest portable   Final Interpretation by Bhupinder Sotomayor MD (12/19 1612)      No significant interval change.            Workstation performed: [id number]         XR chest portable ICU   Final Interpretation by Kyle Vega MD (12/18 1317)      Platelike atelectasis left midlung field. Stable line and tube positioning. Low lung volumes.            Workstation performed: [id number]         FL barium swallow video w speech    (Results Pending)     Cardiology:  ECG 12 lead   Final Result by Zion Rivera MD (12/24 6604)   Normal sinus rhythm   Nonspecific T  "wave changes   Confirmed by Zion Rivera ([id number]) on 12/24/2024 11:48:52 AM      ECG 12 lead   Final Result by Avelino Chase MD (12/17 0959)   Sinus bradycardia   Rightward axis   T wave abnormality, consider inferior ischemia   Abnormal ECG      Confirmed by Avelino Chase ([id number]) on 12/17/2024 9:59:06 AM        GI:  No orders to display           Results from last 7 days   Lab Units 12/25/24 0512 12/24/24 2208 12/24/24 2157 12/23/24  0511 12/22/24  0504   WBC Thousand/uL 10.31*  --  11.94* 12.19* 10.16   HEMOGLOBIN g/dL 9.8*  --  10.4* 10.3* 10.4*   I STAT HEMOGLOBIN g/dl  --  10.9*  --   --   --    HEMATOCRIT % 32.0*  --  33.7* 32.4* 33.3*   HEMATOCRIT, ISTAT %  --  32*  --   --   --    PLATELETS Thousands/uL 182  --  224 177 174   TOTAL NEUT ABS Thousands/µL 8.06*  --  10.12*  --   --          Results from last 7 days   Lab Units 12/25/24 0512 12/24/24 2208 12/24/24 2157 12/23/24  0511 12/22/24  0504 12/21/24  1012   SODIUM mmol/L 144  --  143 137 138 143   POTASSIUM mmol/L 4.6  --  4.3 4.2 4.0 3.8   CHLORIDE mmol/L 105  --  102 101 104 107   CO2 mmol/L 29  --  33* 32 30 32   CO2, I-STAT mmol/L  --  32  --   --   --   --    ANION GAP mmol/L 10  --  8 4 4 4   BUN mg/dL 20  --  19 14 14 13   CREATININE mg/dL 0.71  --  1.07 0.39* 0.40* 0.41*   EGFR ml/min/1.73sq m 119  --  88 153 151 150   CALCIUM mg/dL 8.4  --  9.3 9.0 8.7 8.7   CALCIUM, IONIZED, ISTAT mmol/L  --  1.23  --   --   --   --    MAGNESIUM mg/dL 2.4  --  2.4 2.0 2.0 2.1   PHOSPHORUS mg/dL 4.6*  --   --  3.6  --   --              Results from last 7 days   Lab Units 12/25/24  0512 12/24/24  2157 12/23/24  0511 12/22/24  0504 12/21/24  1012 12/20/24  0522   GLUCOSE RANDOM mg/dL 90 96 116 114 102 86             No results found for: \"BETA-HYDROXYBUTYRATE\"           Results from last 7 days   Lab Units 12/24/24 2208   I STAT BASE EXC mmol/L 6*   I STAT O2 SAT % 93*   ISTAT PH ART  7.414   I STAT ART PCO2 mm HG 48.4*   I STAT ART PO2 mm HG 67.0* "   I STAT ART HCO3 mmol/L 31.0*                         Results from last 7 days   Lab Units 12/23/24  0511   PROCALCITONIN ng/ml 0.11     Results from last 7 days   Lab Units 12/24/24  2157   LACTIC ACID mmol/L 1.1                                                                                                   Network Utilization Review Department  ATTENTION: Please call with any questions or concerns to [phone] and carefully listen to the prompts so that you are directed to the right person. All voicemails are confidential.   For Discharge needs, contact Care Management DC Support Team at [phone] opt. [phone]  Send all requests for admission clinical reviews, approved or denied determinations and any other requests to dedicated fax number below belonging to the campus where the patient is receiving treatment. List of dedicated fax numbers for the Facilities:  FACILITY NAME UR FAX NUMBER   ADMISSION DENIALS (Administrative/Medical Necessity) [phone]   DISCHARGE SUPPORT TEAM (NETWORK) [phone]   PARENT CHILD HEALTH (Maternity/NICU/Pediatrics) [phone]   [facility] [phone]   [facility] [phone]   [facility] [phone]   [facility] [phone]   [facility] [phone]   [facility] [phone]   [facility] [phone]   [facility] [phone]   [facility] [phone]   [facility] [phone]   [facility] [phone]   [facility] [phone]

## 2024-12-26 NOTE — PROGRESS NOTES
Progress Note - Critical Care/ICU   Name: Hemanth Swanson 39 y.o. male I MRN: [id number]  Unit/Bed#: ICU 05 I Date of Admission: 12/16/2024   Date of Service: 12/25/2024 I Hospital Day: 9       Assessment & Plan  Acute on chronic respiratory failure with hypoxia and hypercapnia (HCC)  S/p trach for NM disease.  Baseline Vent dependent  Trach 6XL  History of significant mucous plugs  Completed 7 days of ABX for psuedomonoas pna     Plan:  Continue vent support  Aggressive pulmonary hygiene   Mucomyst, duo-nebs q6h  F/u OP with Pulmonology   Toxic metabolic encephalopathy  Patient with history of waxing and waning agitation   CT negative  UDS + THC, benzos   MRI during previous admission revealed images consistent with anoxic brain injury  Hypernatremia now corrected  12/23: Seroquel started  12/24: Seroquel increased  12/25: Seroquel increased    Plan:  Seroquel 100 BID   Consider more advance Neurological foundation  Consider LTACH facility for discharge   Agitation  Family reports that Hemanth has had episodes of agitation and shaking at home   While hospitalized, he has had multiple episodes of the same. During agitation events, he holds his breath and desaturates. He also tries to hit ,kick and spit at staff  Psych consulted    Plan:  Continue up-titrated Seroquel   See above   Abnormal movements  EEG ordered but there is low suspicion for seizure activity  This is a chronic issue and had previously prolonged EEG in October during these episodes which was negative for seizure activity    Mood disorder (HCC)  See above  Mixed axonal-demyelinating polyneuropathy  Trach and PEG in place  PT/OT  S/P percutaneous endoscopic gastrostomy (PEG) tube placement (HCC)  PEG tube re-placed 12/10 after self dislodgment  History of pulmonary embolus (PE)  Continue Eliquis   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HCC)  S/p resection and chemo  Umbilical hernia without obstruction and without gangrene  Chronic, deemed not a surgical  candidate    Sepsis (HCC) (Resolved: 12/25/2024)  Tachypnea, leukocytosis. HD stable. Lactic 3.1-> 1  ED course: 1L Isolyte, Zosyn, Vanco  Source: Pulmonary  CT chest: complete collapse of the left lower lobe and severe atelectasis of the right lower lobe similar to prior examination. Superimposed aspiration or pneumonia cannot be excluded. Trace left pleural effusion   Sputum culture: pseudomonas and serratia  1 of 2 BC's 12/16 + staph epi- likely contaminant    Plan  Trend CBC, temperature curve, procal  Pneumonia due to Pseudomonas and Serratia (Resolved: 12/25/2024)  12/16 CT chest revealing complete collapse of the left lower lobe and severe atelectasis of the right lower lobe similar to prior examination. Superimposed aspiration or pneumonia cannot be excluded. Trace left pleural effusion.   12/16 Sputum culture + Pseudomonas and Serratia  12/24 completed 7 days of ABX    Disposition: Stepdown Level 1    ICU Core Measures     Vented Patient  VAP Bundle  VAP bundle ordered     A: Assess, Prevent, and Manage Pain Has pain been assessed? Yes  Need for changes to pain regimen? No   B: Both Spontaneous Awakening Trials (SATs) and Spontaneous Breathing Trials (SBTs) Plan to perform spontaneous awakening trial today? Chronic vent   Plan to perform spontaneous breathing trial today? Chronic vent   Obvious barriers to extubation? NA   C: Choice of Sedation RASS Goal: 0 Alert and Calm or N/A patient not on sedation  Need for changes to sedation or analgesia regimen? NA   D: Delirium CAM-ICU: Negative   E: Early Mobility  Plan for early mobility? NA   F: Family Engagement Plan for family engagement today? Yes       Review of Invasive Devices:      Central access plan:  n/a      Prophylaxis:  VTE VTE covered by:  apixaban, Per PEG Tube, 5 mg at 12/25/24 1741       Stress Ulcer  not ordered         24 Hour Events : Seroquel was increased to 100 mg BID. Hypotensive overnight, responsive to 250 ml of Albumin.     Subjective    Review of Systems: Review of Systems not obtainable due to non-verbal    Objective :                   Vitals I/O      Most Recent Min/Max in 24hrs   Temp 98 °F (36.7 °C) Temp  Min: 96.8 °F (36 °C)  Max: 98.2 °F (36.8 °C)   Pulse 94 Pulse  Min: 75  Max: 121   Resp (!) 32 Resp  Min: 14  Max: 32   BP 92/57 BP  Min: 55/34  Max: 134/65   O2 Sat (!) 84 % SpO2  Min: 84 %  Max: 100 %      Intake/Output Summary (Last 24 hours) at 12/25/2024 2140  Last data filed at 12/25/2024 1801  Gross per 24 hour   Intake 3098 ml   Output 700 ml   Net 2398 ml       Diet Enteral/Parenteral; Tube Feeding No Oral Diet; Jevity 1.5; Continuous; 70; 150; Water; Every 4 hours    Invasive Monitoring           Physical Exam   Physical Exam  Vitals reviewed.   Eyes:      Pupils: Pupils are equal, round, and reactive to light.   Skin:     General: Skin is warm and dry.      Coloration: Skin is pale.   HENT:      Mouth/Throat:      Mouth: Mucous membranes are moist.   Neck:      Trachea: Tracheostomy present.   Cardiovascular:      Rate and Rhythm: Regular rhythm. Tachycardia present.      Pulses: Normal pulses.   Musculoskeletal:      Right lower leg: No edema.      Left lower leg: No edema.   Abdominal:      Palpations: Abdomen is soft.   Constitutional:       Appearance: He is ill-appearing.      Interventions: He is restrained.   Pulmonary:      Breath sounds: No wheezing, rhonchi or rales.   Neurological:      Motor: gross motor function is at baseline for patient.          Diagnostic Studies        Lab Results: I have reviewed the following results:     Medications:  Scheduled PRN   acetylcysteine, 3 mL, Q6H  apixaban, 5 mg, BID  chlorhexidine, 15 mL, Q12H ISAEL  ipratropium-albuterol, 3 mL, Q6H  melatonin, 6 mg, HS  QUEtiapine, 100 mg, HS  [START ON 12/26/2024] QUEtiapine, 100 mg, Daily      acetaminophen, 1,000 mg, Q8H PRN  polyethylene glycol, 17 g, Daily PRN       Continuous          Labs:   CBC    Recent Labs     12/24/24  2217  12/24/24 2208 12/25/24  0512   WBC 11.94*  --  10.31*   HGB 10.4* 10.9* 9.8*   HCT 33.7* 32* 32.0*     --  182     BMP    Recent Labs     12/24/24 2157 12/24/24 2208 12/25/24  0512   SODIUM 143  --  144   K 4.3  --  4.6     --  105   CO2 33* 32 29   AGAP 8  --  10   BUN 19  --  20   CREATININE 1.07  --  0.71   CALCIUM 9.3  --  8.4       Coags    No recent results     Additional Electrolytes  Recent Labs     12/24/24 2157 12/24/24 2208 12/25/24  0512   MG 2.4  --  2.4   PHOS  --   --  4.6*   CAIONIZED  --  1.23  --           Blood Gas    No recent results  No recent results LFTs  No recent results    Infectious  No recent results  Glucose  Recent Labs     12/24/24 2157 12/25/24  0512   GLUC 96 90

## 2024-12-26 NOTE — ASSESSMENT & PLAN NOTE
This patient currently demonstrates symptoms of depression.  He does carry a prior diagnosis of bipolar disorder, though is a fairly limited historian and on current assessment, denies any periods of time that would be consistent with emily or hypomania with no periods of time with decreased need for sleep with excessively elevated mood or energy.  He does have a history of significant agitation and behavioral concerns.  His sister is unable to provide further clarification regarding his diagnostic history, though did note that he was stable on Seroquel and an uncertain other antidepressant recently before he discontinued these.    Seroquel has been restarted at 25 mg twice daily, will plan to titrate to 50 mg twice daily and continue to titrate further as indicated/tolerated.

## 2024-12-27 ENCOUNTER — TELEPHONE (OUTPATIENT)
Dept: FAMILY MEDICINE CLINIC | Facility: CLINIC | Age: 39
End: 2024-12-27

## 2024-12-27 VITALS
BODY MASS INDEX: 25.91 KG/M2 | RESPIRATION RATE: 14 BRPM | WEIGHT: 212.74 LBS | HEIGHT: 76 IN | HEART RATE: 83 BPM | TEMPERATURE: 97.6 F | SYSTOLIC BLOOD PRESSURE: 97 MMHG | DIASTOLIC BLOOD PRESSURE: 63 MMHG | OXYGEN SATURATION: 98 %

## 2024-12-27 LAB
ANION GAP SERPL CALCULATED.3IONS-SCNC: 5 MMOL/L (ref 4–13)
BASOPHILS # BLD AUTO: 0.03 THOUSANDS/ÂΜL (ref 0–0.1)
BASOPHILS NFR BLD AUTO: 0 % (ref 0–1)
BUN SERPL-MCNC: 22 MG/DL (ref 5–25)
CALCIUM SERPL-MCNC: 9.4 MG/DL (ref 8.4–10.2)
CHLORIDE SERPL-SCNC: 103 MMOL/L (ref 96–108)
CO2 SERPL-SCNC: 34 MMOL/L (ref 21–32)
CREAT SERPL-MCNC: 0.36 MG/DL (ref 0.6–1.3)
EOSINOPHIL # BLD AUTO: 0.08 THOUSAND/ÂΜL (ref 0–0.61)
EOSINOPHIL NFR BLD AUTO: 1 % (ref 0–6)
ERYTHROCYTE [DISTWIDTH] IN BLOOD BY AUTOMATED COUNT: 15.4 % (ref 11.6–15.1)
GFR SERPL CREATININE-BSD FRML MDRD: 158 ML/MIN/1.73SQ M
GLUCOSE SERPL-MCNC: 105 MG/DL (ref 65–140)
HCT VFR BLD AUTO: 32.9 % (ref 36.5–49.3)
HGB BLD-MCNC: 10 G/DL (ref 12–17)
IMM GRANULOCYTES # BLD AUTO: 0.02 THOUSAND/UL (ref 0–0.2)
IMM GRANULOCYTES NFR BLD AUTO: 0 % (ref 0–2)
LDH SERPL-CCNC: 357 U/L (ref 140–271)
LYMPHOCYTES # BLD AUTO: 0.75 THOUSANDS/ÂΜL (ref 0.6–4.47)
LYMPHOCYTES NFR BLD AUTO: 8 % (ref 14–44)
MAGNESIUM SERPL-MCNC: 2.3 MG/DL (ref 1.9–2.7)
MCH RBC QN AUTO: 29.9 PG (ref 26.8–34.3)
MCHC RBC AUTO-ENTMCNC: 30.4 G/DL (ref 31.4–37.4)
MCV RBC AUTO: 99 FL (ref 82–98)
MONOCYTES # BLD AUTO: 0.66 THOUSAND/ÂΜL (ref 0.17–1.22)
MONOCYTES NFR BLD AUTO: 7 % (ref 4–12)
NEUTROPHILS # BLD AUTO: 7.89 THOUSANDS/ÂΜL (ref 1.85–7.62)
NEUTS SEG NFR BLD AUTO: 84 % (ref 43–75)
NRBC BLD AUTO-RTO: 0 /100 WBCS
PHOSPHATE SERPL-MCNC: 3.1 MG/DL (ref 2.7–4.5)
PLATELET # BLD AUTO: 190 THOUSANDS/UL (ref 149–390)
PMV BLD AUTO: 10.4 FL (ref 8.9–12.7)
POTASSIUM SERPL-SCNC: 4.9 MMOL/L (ref 3.5–5.3)
RBC # BLD AUTO: 3.34 MILLION/UL (ref 3.88–5.62)
SODIUM SERPL-SCNC: 142 MMOL/L (ref 135–147)
WBC # BLD AUTO: 9.43 THOUSAND/UL (ref 4.31–10.16)

## 2024-12-27 PROCEDURE — 94003 VENT MGMT INPAT SUBQ DAY: CPT

## 2024-12-27 PROCEDURE — 83735 ASSAY OF MAGNESIUM: CPT | Performed by: NURSE PRACTITIONER

## 2024-12-27 PROCEDURE — 80048 BASIC METABOLIC PNL TOTAL CA: CPT | Performed by: NURSE PRACTITIONER

## 2024-12-27 PROCEDURE — 94760 N-INVAS EAR/PLS OXIMETRY 1: CPT

## 2024-12-27 PROCEDURE — 94669 MECHANICAL CHEST WALL OSCILL: CPT

## 2024-12-27 PROCEDURE — 85025 COMPLETE CBC W/AUTO DIFF WBC: CPT | Performed by: NURSE PRACTITIONER

## 2024-12-27 PROCEDURE — 94150 VITAL CAPACITY TEST: CPT

## 2024-12-27 PROCEDURE — NC001 PR NO CHARGE

## 2024-12-27 PROCEDURE — 84100 ASSAY OF PHOSPHORUS: CPT | Performed by: NURSE PRACTITIONER

## 2024-12-27 PROCEDURE — 94640 AIRWAY INHALATION TREATMENT: CPT

## 2024-12-27 PROCEDURE — 83615 LACTATE (LD) (LDH) ENZYME: CPT | Performed by: ANESTHESIOLOGY

## 2024-12-27 PROCEDURE — 99239 HOSP IP/OBS DSCHRG MGMT >30: CPT | Performed by: NURSE PRACTITIONER

## 2024-12-27 RX ORDER — QUETIAPINE FUMARATE 100 MG/1
100 TABLET, FILM COATED ORAL DAILY
Qty: 30 TABLET | Refills: 0 | Status: SHIPPED | OUTPATIENT
Start: 2024-12-28

## 2024-12-27 RX ORDER — QUETIAPINE FUMARATE 150 MG/1
150 TABLET, FILM COATED ORAL
Qty: 30 TABLET | Refills: 0 | Status: SHIPPED | OUTPATIENT
Start: 2024-12-27

## 2024-12-27 RX ORDER — ACETYLCYSTEINE 200 MG/ML
3 SOLUTION ORAL; RESPIRATORY (INHALATION) EVERY 6 HOURS
Qty: 360 ML | Refills: 0 | Status: SHIPPED | OUTPATIENT
Start: 2024-12-27

## 2024-12-27 RX ORDER — IPRATROPIUM BROMIDE AND ALBUTEROL SULFATE 2.5; .5 MG/3ML; MG/3ML
3 SOLUTION RESPIRATORY (INHALATION)
Qty: 360 ML | Refills: 0 | Status: SHIPPED | OUTPATIENT
Start: 2024-12-27

## 2024-12-27 RX ADMIN — QUETIAPINE FUMARATE 100 MG: 100 TABLET ORAL at 09:03

## 2024-12-27 RX ADMIN — IPRATROPIUM BROMIDE AND ALBUTEROL SULFATE 3 ML: .5; 3 SOLUTION RESPIRATORY (INHALATION) at 02:00

## 2024-12-27 RX ADMIN — APIXABAN 5 MG: 5 TABLET, FILM COATED ORAL at 09:03

## 2024-12-27 RX ADMIN — CHLORHEXIDINE GLUCONATE 15 ML: 1.2 RINSE ORAL at 09:03

## 2024-12-27 RX ADMIN — IPRATROPIUM BROMIDE AND ALBUTEROL SULFATE 3 ML: .5; 3 SOLUTION RESPIRATORY (INHALATION) at 07:32

## 2024-12-27 RX ADMIN — ACETYLCYSTEINE 600 MG: 200 SOLUTION ORAL; RESPIRATORY (INHALATION) at 07:32

## 2024-12-27 RX ADMIN — ACETYLCYSTEINE 600 MG: 200 SOLUTION ORAL; RESPIRATORY (INHALATION) at 02:00

## 2024-12-27 NOTE — TELEPHONE ENCOUNTER
Please do TCM call and offer virtual TCM appointment with Dr. Douglas   I will forward to her regarding seroquel

## 2024-12-27 NOTE — CASE MANAGEMENT
Case Management Discharge Planning Note    Patient name Hemanth Swanson  Location ICU 05/ICU 05 MRN [id number]  : 1985 Date 2024       Current Admission Date: 2024  Current Admission Diagnosis:Acute on chronic respiratory failure with hypoxia and hypercapnia (HCC)   Patient Active Problem List    Diagnosis Date Noted Date Diagnosed    Mood disorder (HCC) 2024     Abnormal movements 2024     Agitation 2024     History of pulmonary embolus (PE) 2024     Rash of back 10/25/2024     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HCC) 10/17/2024     Palliative care by specialist 10/11/2024     Goals of care, counseling/discussion 10/11/2024     Toxic metabolic encephalopathy 10/07/2024     Cardiac arrest due to other underlying condition ([facility]) 2024     Seizure-like activity ([facility]) 2024     Bradycardia 2024     Ventilator dependent ([facility]) 2024     Obesity hypoventilation syndrome ([facility]) 10/24/2023     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     Psychophysiological insomnia 10/18/2023     Impaired mobility 2023     Status post tracheostomy ([facility]) 2023     S/P percutaneous endoscopic gastrostomy (PEG) tube placement ([facility]) 2023     Anxiety 2023     Acute on chronic respiratory failure with hypoxia and hypercapnia ([facility]) 2023     Acute pulmonary embolism without acute cor pulmonale ([facility]) 2023     Depression 2023     History of LVH (left ventricular hypertrophy) 2023     Pure hypertriglyceridemia 2023     Unilateral vestibular schwannoma ([facility]) 2023     Port-A-Cath in place 2023     Diplopia 2023     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[facility]) 2023     Umbilical hernia without obstruction and without gangrene 12/10/2022     Tobacco use 12/10/2022     Retroperitoneal lymphadenopathy 12/10/2022       LOS (days): 11  Geometric Mean LOS (GMLOS) (days):   Days to GMLOS:     OBJECTIVE:  Risk of Unplanned Readmission  Score: 39.33     Current admission status: Inpatient   Preferred Pharmacy:   [id number] - [place], [place] - [address]  [address] [address]  Phone: [phone] Fax: [phone]    Primary Care Provider: Ela Douglas MD    Primary Insurance: [facility]  Secondary Insurance:     DISCHARGE DETAILS:    Other Referral/Resources/Interventions Provided:  Interventions: HHC, DME  Referral Comments: AVS and F2F sent to [facility] via [employer].  Gabriel Hudson, RT supervisor, confirmed with Hilario Meraz at [facility] that pt has nebulizer at home already for recommended treatments.    Treatment Team Recommendation: SNF  Discharge Destination Plan:: Home with Home Health Care  Transport at Discharge : Other (Comment) ([facility])

## 2024-12-27 NOTE — UTILIZATION REVIEW
Continued Stay Review    Date: 12/27/2024                          Current Patient Class: Inpatient  Current Level of Care: Critical Care     HPI:39 y.o. male initially admitted on 12/16/2024     Assessment/Plan: 12/27/24    Assessment:  Vent-dependent hypoxemic and hypercapnic respiratory failure  Bronchitis, tracheal aspirate yielding Pseudomonas and Serratia  Toxic metabolic encephalopathy, agitation, rhythmic movements  Hx of transverse myelitis, unknown neuromuscular disorder    Neuro: Continue quetiapine 150 mg qhs and 100 mg qam. Referral to psych on discharge, need for psych follow-up communicated to caretaker.  Pulm: Transition to home vent prior to discharge. Continue mucomyst.  Renal: Outpatient labs ordered by urology were drawn here yesterday for convenience. Testosterone is still pending. Communicated to family critical care service does not follow patients longitudinally outside of hospital and they will need to contact urology for any recommendations when outstanding labs result. Continue free water flushes 150 mL q 4 hours. This regimen was communicated to caretaker, who was also instructed to obtain BMP to ensure sodium level remains acceptable on this regimen over a longer duration. BMP ordered to be drawn two weeks from discharge.  ID: Completed course of antibiotics for aspiration pneumonia vs bronchitis.  Heme: Continue eliquis for hx of PE.  GI: Pureed diet with thin liquids for pleasure. Continue Jevity 1.5. Will discuss with nutrition whether there is a less viscous alternative to minimize clogging of gastric tube.  MSK: Continue PT/OT on discharge home.  Dispo: Stable for discharge home.  Code: Level 1        Medications:   Scheduled Medications:  acetylcysteine, 3 mL, Nebulization, Q6H  apixaban, 5 mg, Per PEG Tube, BID  chlorhexidine, 15 mL, Mouth/Throat, Q12H ISAEL  ipratropium-albuterol, 3 mL, Nebulization, Q6H  melatonin, 6 mg, Oral, HS  QUEtiapine, 100 mg, Oral, Daily  QUEtiapine, 150  mg, Oral, HS      Continuous IV Infusions:     PRN Meds:  acetaminophen, 1,000 mg, Intravenous, Q8H PRN  polyethylene glycol, 17 g, Oral, Daily PRN      Discharge Plan:  Home with [facility] through [facility].  SAHIL assisted  to care of  his sister/Caregiver.   Home on 12/27/24    Vital Signs (last 3 days)       Date/Time Temp Pulse Resp BP MAP (mmHg) SpO2 FiO2 (%) O2 Device Patient Position - Orthostatic VS [place] Coma Scale Score Pain    12/27/24 1200 -- 83 14 97/63 73 98 % 40 Ventilator Lying -- No Pain    12/27/24 1127 97.6 °F (36.4 °C) -- -- -- -- -- -- -- -- -- --    12/27/24 1110 -- -- -- -- -- -- 40 Ventilator -- -- --    12/27/24 1022 -- -- -- -- -- -- 40 Ventilator -- -- --    12/27/24 0900 -- 105 19 124/86 101 98 % -- -- -- -- --    12/27/24 0800 -- 92 14 107/60 77 100 % 45 Ventilator Lying 11 No Pain    12/27/24 0734 -- -- -- -- -- 100 % 45 Ventilator -- -- --    12/27/24 0708 97.6 °F (36.4 °C) -- -- -- -- -- -- -- -- -- --    12/27/24 0700 -- 83 14 -- -- 99 % -- -- -- -- --    12/27/24 0539 97.1 °F (36.2 °C) 74 14 126/73 94 99 % -- -- -- -- --    12/27/24 0538 -- 79 17 139/72 99 99 % -- -- -- -- --    12/27/24 0411 -- -- -- -- -- 100 % -- -- -- -- --    12/27/24 0400 -- 82 14 122/76 93 97 % -- -- -- -- --    12/27/24 0300 -- 83 17 112/74 87 92 % -- -- -- 11 --    12/27/24 0200 -- 78 14 101/62 77 95 % -- -- -- -- --    12/27/24 0100 -- 82 14 95/57 71 93 % -- -- -- -- --    12/27/24 0027 -- -- -- -- -- 94 % -- -- -- -- --    12/27/24 0013 97.6 °F (36.4 °C) -- -- -- -- -- -- -- -- -- --    12/27/24 0000 -- 89 14 102/59 73 94 % -- -- -- -- --    12/26/24 2300 -- 93 14 99/55 71 97 % -- -- -- 11 --    12/26/24 2255 -- 95 14 98/57 72 96 % -- -- -- -- --    12/26/24 2135 -- 124 22 87/69 74 94 % -- -- -- -- --    12/26/24 2130 -- 120 19 88/63 71 94 % -- -- -- -- --    12/26/24 2100 -- 132 25 102/71 82 96 % -- -- -- -- --    12/26/24 2030 -- 128 16 123/78 96 94 % -- -- -- -- --    12/26/24 2006 --  -- -- -- -- 97 % -- -- -- -- --    12/26/24 2000 -- 121 23 91/74 79 92 % -- -- -- -- --    12/26/24 1930 -- 124 25 108/69 84 96 % -- -- -- -- --    12/26/24 1900 -- 130 21 97/45 65 97 % -- -- -- 11 5    12/26/24 1800 -- 128 20 115/73 89 94 % -- -- -- -- --    12/26/24 1700 -- 130 18 120/85 99 94 % -- -- -- -- --    12/26/24 1619 -- -- -- -- -- -- 50 Ventilator -- -- --    12/26/24 1500 -- 127 20 132/71 93 92 % -- -- -- -- --    12/26/24 1400 -- 129 18 101/41 59 95 % -- -- -- -- --    12/26/24 1300 -- 127 21 111/57 77 96 % 50 Ventilator -- -- --    12/26/24 1200 -- 110 28 119/67 87 99 % -- -- -- -- --    12/26/24 1152 -- -- -- -- -- -- 50 Ventilator -- -- --    12/26/24 1000 -- 120 16 105/63 77 97 % -- -- -- -- --    12/26/24 0915 -- -- -- -- -- -- 50  Ventilator -- -- --    12/26/24 0900 -- 120 21 189/81 116 85 % -- -- -- -- --    12/26/24 0800 -- 105 21 97/66 75 94 % -- Ventilator -- -- --    12/26/24 0744 -- -- -- -- -- -- 40 Ventilator -- -- --    12/26/24 0630 -- 84 14 116/75 91 97 % -- -- -- -- --    12/26/24 0600 -- 92 13 86/53 64 95 % -- -- -- -- --    12/26/24 0530 -- 93 14 93/54 68 94 % -- -- -- -- --    12/26/24 0500 -- 102 16 100/55 72 69 % -- -- -- -- --    12/26/24 0429 -- -- -- -- -- 93 % 40 Ventilator -- -- --    12/26/24 0400 96.4 °F (35.8 °C) 103 16 93/60 69 100 % -- Ventilator -- 11 No Pain    12/26/24 0330 -- 60 100 89/51 65 71 % -- -- -- -- --    12/26/24 0320 -- 90 20 -- -- 93 % -- -- -- -- --    12/26/24 0300 -- 77 20 77/50 60 86 % -- -- -- -- --    12/26/24 0201 -- 73 17 -- -- 98 % -- -- -- -- --    12/26/24 0200 -- 74 20 92/59 70 98 % -- -- -- -- --    12/26/24 0130 -- 76 14 100/71 82 99 % -- -- -- -- --    12/26/24 0100 -- 71 14 91/60 71 98 % -- -- -- -- --    12/26/24 0030 -- 72 14 90/63 72 -- -- -- -- -- --    12/26/24 0000 96 °F (35.6 °C) 77 14 89/61 70 96 % 40 Ventilator Lying 11 No Pain    12/25/24 2310 -- 90 30 94/59 72 94 % -- -- -- -- --    12/25/24 2300 -- 86 14 83/51 62 89 % -- --  -- -- --    12/25/24 2258 -- -- -- -- -- 92 % 40 Ventilator -- -- --    12/25/24 2200 -- 93 14 82/53 63 94 % -- -- -- -- --    12/25/24 2100 -- 94 32 92/57 69 92 % -- -- -- -- --    12/25/24 2043 98 °F (36.7 °C) -- -- -- -- -- -- -- -- -- --    12/25/24 2000 -- 97 111 100/65 77 93 % -- Ventilator Lying 11 No Pain    12/25/24 1954 -- 100 26 -- -- 100 % -- -- -- -- --    12/25/24 1939 -- 96 14 -- -- 98 % 40 Ventilator -- -- --    12/25/24 1800 -- 95 21 115/89 99 100 % -- -- -- -- --    12/25/24 1700 -- 92 14 108/73 85 98 % -- -- -- -- --    12/25/24 1600 -- 99 28 110/69 82 93 % 40 -- -- -- --    12/25/24 1542 98.2 °F (36.8 °C) -- -- -- -- -- -- -- -- -- --    12/25/24 1519 -- -- -- -- -- 94 % -- -- -- -- --    12/25/24 1500 -- 91 16 107/58 77 -- -- -- -- -- --    12/25/24 1400 -- 85 14 106/72 86 95 % -- -- -- -- --    12/25/24 1307 -- -- -- -- -- 99 % -- -- -- -- --    12/25/24 1300 -- 79 15 106/69 81 99 % -- -- -- -- --    12/25/24 1200 98 °F (36.7 °C) 89 14 96/61 73 98 % -- Ventilator Lying -- --    12/25/24 1115 -- -- -- -- -- 94 % -- -- -- -- --    12/25/24 1100 -- 75 14 101/66 80 95 % -- -- -- -- --    12/25/24 0900 -- 99 14 90/50 63 -- -- -- -- -- --    12/25/24 0800 -- 97 21 103/64 79 95 % -- Ventilator Lying 11 --    12/25/24 0750 -- -- -- -- -- 98 % -- -- -- -- --    12/25/24 0720 97.4 °F (36.3 °C) -- -- -- -- -- -- -- -- -- --    12/25/24 0700 -- 91 22 134/65 88 -- -- -- -- -- --    12/25/24 0600 -- 92 14 93/59 72 97 % -- -- -- -- --    12/25/24 0500 -- 91 14 103/63 79 96 % -- -- -- -- --    12/25/24 0430 -- 98 15 94/58 69 97 % -- -- -- -- --    12/25/24 0417 -- -- -- -- -- 97 % -- -- -- -- --    12/25/24 0400 96.8 °F (36 °C) 83 15 108/76 88 99 % -- -- Lying 11 --    12/25/24 0300 -- 98 17 89/51 64 91 % -- -- -- -- --    12/25/24 0210 -- -- -- -- -- 97 % -- -- -- -- --    12/25/24 0200 -- 82 19 104/67 81 98 % -- -- -- -- --    12/25/24 0130 -- 85 14 86/53 65 97 % -- -- -- -- --    12/25/24 0115 -- 90 14  85/50 62 97 % -- -- -- -- --    12/25/24 0100 -- 92 14 83/51 62 97 % -- -- -- -- --    12/25/24 0045 -- 99 15 82/53 63 96 % -- -- -- -- --    12/25/24 0030 -- 101 14 81/50 60 96 % -- -- -- -- --    12/25/24 0015 -- 101 14 85/54 65 97 % -- -- -- -- --    12/25/24 0000 97 °F (36.1 °C) 102 15 79/53 61 97 % -- -- Lying 11 No Pain    12/24/24 2359 -- 102 14 79/53 61 97 % -- -- -- -- --    12/24/24 2300 -- 103 14 70/46 54 98 % -- -- -- -- --    12/24/24 2245 -- 105 14 70/43 52 98 % -- -- -- -- --    12/24/24 2230 -- 107 14 74/41 52 97 % -- -- -- -- --    12/24/24 2215 -- 109 14 67/40 49 96 % -- -- -- -- --    12/24/24 2200 -- 115 14 59/35 43 96 % -- -- -- -- --    12/24/24 2145 -- 121 14 55/34 40 100 % -- -- -- -- --    12/24/24 2100 -- 128 14 50/23 30 96 % -- -- -- -- --    12/24/24 2000 96.9 °F (36.1 °C) 140 140 159/76 109 98 % -- -- -- 11 --    12/24/24 1930 -- 125 52 107/63 80 100 % -- -- -- -- --    12/24/24 1929 -- -- -- -- -- 98 % -- -- -- -- --    12/24/24 1900 -- 128 22 100/70 82 97 % -- -- -- -- --    12/24/24 1800 -- 121 28 106/68 79 98 % -- -- -- -- --    12/24/24 1700 -- 110 15 93/59 70 96 % -- -- -- -- --    12/24/24 1600 99 °F (37.2 °C) 101 51 88/57 66 91 % 40 Ventilator Lying 11 --    12/24/24 1522 -- -- -- -- -- 99 % -- -- -- -- --    12/24/24 1436 -- -- -- -- -- 91 % -- Ventilator -- -- --    12/24/24 1400 -- -- -- 87/53 63 -- -- -- -- -- --    12/24/24 1300 -- 84 14 91/54 67 98 % -- -- -- -- --    12/24/24 1200 98.5 °F (36.9 °C) 88 14 108/73 84 97 % -- Ventilator Lying 11 --    12/24/24 1100 -- 94 15 126/82 98 97 % -- -- -- -- --    12/24/24 1000 -- 92 16 147/84 106 94 % -- -- -- -- --    12/24/24 0900 -- 99 17 165/106 130 95 % -- -- -- -- --    12/24/24 0800 97.8 °F (36.6 °C) 90 18 164/110 133 96 % -- -- Lying 11 --    12/24/24 0740 -- 80 15 -- -- 98 % -- -- -- -- --    12/24/24 0700 -- 83 14 123/80 96 97 % -- -- -- -- --    12/24/24 0600 -- 85 15 147/100 119 97 % -- -- -- -- --    12/24/24 0500 -- 90  15 118/78 92 98 % -- -- -- -- --    12/24/24 0412 -- -- -- -- -- 96 % -- -- -- -- --    12/24/24 0400 98 °F (36.7 °C) 92 16 138/90 109 96 % 40 Ventilator Lying 11 No Pain    12/24/24 0300 -- 97 14 147/93 114 96 % -- -- -- -- --    12/24/24 0200 -- 90 15 126/79 98 93 % -- -- -- -- --    12/24/24 0131 -- -- -- -- -- 96 % -- -- -- -- --    12/24/24 0100 -- 85 16 161/99 126 96 % -- -- -- -- --    12/24/24 0000 98.2 °F (36.8 °C) 81 14 114/74 90 96 % 40 Ventilator Lying 11 No Pain          Weight (last 2 days)       Date/Time Weight    12/27/24 0600 96.5 (212.74)    12/26/24 0600 97.8 (215.61)            Pertinent Labs/Diagnostic Results:   Radiology:  FL barium swallow video w speech   Final Interpretation by KASIA FOSTER (12/26 1208)      XR chest portable   Final Interpretation by Atilio Chris MD (12/23 1634)      No significant interval change since 12/20/2024            Workstation performed: [id number]         XR chest portable   Final Interpretation by Jose Briones MD (12/20 1337)      Poor inspiration. Congestive changes and effusion suggested..            Workstation performed: [id number]         XR chest portable   Final Interpretation by Bhupinder Sotomayor MD (12/19 1612)      No significant interval change.            Workstation performed: [id number]         XR chest portable ICU   Final Interpretation by Kyle Vega MD (12/18 1317)      Platelike atelectasis left midlung field. Stable line and tube positioning. Low lung volumes.            Workstation performed: [id number]           Cardiology:  ECG 12 lead   Final Result by Zion Rivera MD (12/24 1148)   Normal sinus rhythm   Nonspecific T wave changes   Confirmed by Zion Rivera ([id number]) on 12/24/2024 11:48:52 AM      ECG 12 lead   Final Result by Avelino Chase MD (12/17 0959)   Sinus bradycardia   Rightward axis   T wave abnormality, consider inferior ischemia   Abnormal ECG      Confirmed by Avelino Chase  ([id number]) on 12/17/2024 9:59:06 AM        GI:  No orders to display           Results from last 7 days   Lab Units 12/27/24 0645 12/25/24 0512 12/24/24 2208 12/24/24 2157 12/23/24  0511   WBC Thousand/uL 9.43 10.31*  --  11.94* 12.19*   HEMOGLOBIN g/dL 10.0* 9.8*  --  10.4* 10.3*   I STAT HEMOGLOBIN g/dl  --   --  10.9*  --   --    HEMATOCRIT % 32.9* 32.0*  --  33.7* 32.4*   HEMATOCRIT, ISTAT %  --   --  32*  --   --    PLATELETS Thousands/uL 190 182  --  224 177   TOTAL NEUT ABS Thousands/µL 7.89* 8.06*  --  10.12*  --          Results from last 7 days   Lab Units 12/27/24 0645 12/25/24 0512 12/24/24 2208 12/24/24 2157 12/23/24  0511 12/22/24  0504   SODIUM mmol/L 142 144  --  143 137 138   POTASSIUM mmol/L 4.9 4.6  --  4.3 4.2 4.0   CHLORIDE mmol/L 103 105  --  102 101 104   CO2 mmol/L 34* 29  --  33* 32 30   CO2, I-STAT mmol/L  --   --  32  --   --   --    ANION GAP mmol/L 5 10  --  8 4 4   BUN mg/dL 22 20  --  19 14 14   CREATININE mg/dL 0.36* 0.71  --  1.07 0.39* 0.40*   EGFR ml/min/1.73sq m 158 119  --  88 153 151   CALCIUM mg/dL 9.4 8.4  --  9.3 9.0 8.7   CALCIUM, IONIZED, ISTAT mmol/L  --   --  1.23  --   --   --    MAGNESIUM mg/dL 2.3 2.4  --  2.4 2.0 2.0   PHOSPHORUS mg/dL 3.1 4.6*  --   --  3.6  --              Results from last 7 days   Lab Units 12/27/24 0645 12/25/24 0512 12/24/24 2157 12/23/24  0511 12/22/24  0504 12/21/24  1012   GLUCOSE RANDOM mg/dL 105 90 96 116 114 102       Results from last 7 days   Lab Units 12/24/24  2208   I STAT BASE EXC mmol/L 6*   I STAT O2 SAT % 93*   ISTAT PH ART  7.414   I STAT ART PCO2 mm HG 48.4*   I STAT ART PO2 mm HG 67.0*   I STAT ART HCO3 mmol/L 31.0*       Results from last 7 days   Lab Units 12/23/24  0511   PROCALCITONIN ng/ml 0.11     Results from last 7 days   Lab Units 12/24/24  2157   LACTIC ACID mmol/L 1.1     Results from last 7 days   Lab Units 12/26/24  1602   PROLACTIN ng/mL 37.59*         Network Utilization Review Department  ATTENTION:  Please call with any questions or concerns to [phone] and carefully listen to the prompts so that you are directed to the right person. All voicemails are confidential.   For Discharge needs, contact Care Management DC Support Team at [phone] opt. 2  Send all requests for admission clinical reviews, approved or denied determinations and any other requests to dedicated fax number below belonging to the campus where the patient is receiving treatment. List of dedicated fax numbers for the Facilities:  FACILITY NAME UR FAX NUMBER   ADMISSION DENIALS (Administrative/Medical Necessity) [phone]   DISCHARGE SUPPORT TEAM (NETWORK) [phone]   PARENT CHILD HEALTH (Maternity/NICU/Pediatrics) [phone]   [facility] [phone]   [facility] [phone]   [facility] [phone]   [facility] [phone]   [facility] [phone]   [facility] [phone]   [facility] [phone]   [facility] [phone]   [facility] [phone]   [facility] [phone]   [facility] [phone]   [facility] [phone]

## 2024-12-27 NOTE — PROGRESS NOTES
Progress Note - Critical Care/ICU   Name: Hemanth Swanson 39 y.o. male I MRN: [id number]  Unit/Bed#: ICU 05 I Date of Admission: 12/16/2024   Date of Service: 12/26/2024 I Hospital Day: 10       Assessment & Plan  Acute on chronic respiratory failure with hypoxia and hypercapnia (HCC)  S/p trach for NM disease.  Baseline Vent dependent  Trach 6XL  History of significant mucous plugs  Completed 7 days of ABX for psuedomonoas pna     Plan:  Continue vent support  Aggressive pulmonary hygiene   Mucomyst, duo-nebs q6h  F/u OP with Pulmonology   Toxic metabolic encephalopathy  Patient with history of waxing and waning agitation   CT negative  UDS + THC, benzos   MRI during previous admission revealed images consistent with anoxic brain injury  Hypernatremia now corrected  12/23: Seroquel started  12/24: Seroquel increased  12/25: Seroquel increased  12/26: psych re-consulted     Plan:  Seroquel 100 in AM and 150 HS  Agitation  Family reports that Hemanth has had episodes of agitation and shaking at home   While hospitalized, he has had multiple episodes of the same. During agitation events, he holds his breath and desaturates. He also tries to hit ,kick and spit at staff  Psych consulted    Plan:  Continue up-titrated Seroquel   See above   Abnormal movements  EEG ordered but there is low suspicion for seizure activity  This is a chronic issue and had previously prolonged EEG in October during these episodes which was negative for seizure activity    Mood disorder (HCC)  See above  Mixed axonal-demyelinating polyneuropathy  Trach and PEG in place  PT/OT  S/P percutaneous endoscopic gastrostomy (PEG) tube placement (HCC)  PEG tube re-placed 12/10 after self dislodgment  History of pulmonary embolus (PE)  Continue Eliquis   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HCC)  S/p resection and chemo  Umbilical hernia without obstruction and without gangrene  Chronic, deemed not a surgical candidate    Disposition: Stepdown Level 1    ICU Core  Measures     Vented Patient  VAP Bundle  VAP bundle ordered     A: Assess, Prevent, and Manage Pain Has pain been assessed? Yes  Need for changes to pain regimen? No   B: Both Spontaneous Awakening Trials (SATs) and Spontaneous Breathing Trials (SBTs) Plan to perform spontaneous awakening trial today? Chronic vent   Plan to perform spontaneous breathing trial today? Chronic vent   Obvious barriers to extubation? NA   C: Choice of Sedation RASS Goal: 0 Alert and Calm or N/A patient not on sedation  Need for changes to sedation or analgesia regimen? NA   D: Delirium CAM-ICU: Negative   E: Early Mobility  Plan for early mobility? NA   F: Family Engagement Plan for family engagement today? Yes       Review of Invasive Devices:      Central access plan:  n/a      Prophylaxis:  VTE VTE covered by:  apixaban, Per PEG Tube, 5 mg at 12/26/24 1718       Stress Ulcer  not ordered         24 Hour Events : Psych was asked to re-evaluate. Seroquel HS was up-titrated to 150 mg. Patient underwent VBS and was cleared for pleasure feeds with pureed diet.     Subjective   Review of Systems: See HPI for Review of Systems    Objective :                   Vitals I/O      Most Recent Min/Max in 24hrs   Temp (!) 96.4 °F (35.8 °C) Temp  Min: 96 °F (35.6 °C)  Max: 96.4 °F (35.8 °C)   Pulse (!) 121 Pulse  Min: 60  Max: 130   Resp (!) 23 Resp  Min: 13  Max: 100   BP 91/74 BP  Min: 77/50  Max: 189/81   O2 Sat 97 % SpO2  Min: 69 %  Max: 100 %      Intake/Output Summary (Last 24 hours) at 12/26/2024 2147  Last data filed at 12/26/2024 0601  Gross per 24 hour   Intake 1521 ml   Output 200 ml   Net 1321 ml       Diet Enteral/Parenteral; Tube Feeding with Oral Diet; Jevity 1.5; Continuous; 70; 150; Water; Every 4 hours; Dysphagia/Modified Consistency; Dysphagia 1-Pureed; Thin Liquid    Invasive Monitoring           Physical Exam   Physical Exam  Vitals reviewed.   Eyes:      Pupils: Pupils are equal, round, and reactive to light.   Skin:      General: Skin is warm and dry.   HENT:      Mouth/Throat:      Mouth: Mucous membranes are moist.   Neck:      Trachea: Tracheostomy present.   Cardiovascular:      Rate and Rhythm: Regular rhythm. Tachycardia present.      Pulses: Normal pulses.   Musculoskeletal:      Right lower leg: No edema.      Left lower leg: No edema.   Abdominal: General: There is abdominal type feeding tube.     Palpations: Abdomen is soft.   Constitutional:       Appearance: He is ill-appearing.      Interventions: He is restrained.   Pulmonary:      Breath sounds: No wheezing, rhonchi or rales.   Neurological:      Motor: gross motor function is at baseline for patient.          Diagnostic Studies        Lab Results: I have reviewed the following results:     Medications:  Scheduled PRN   acetylcysteine, 3 mL, Q6H  apixaban, 5 mg, BID  chlorhexidine, 15 mL, Q12H ISAEL  ipratropium-albuterol, 3 mL, Q6H  melatonin, 6 mg, HS  QUEtiapine, 100 mg, HS  QUEtiapine, 100 mg, Daily      acetaminophen, 1,000 mg, Q8H PRN  polyethylene glycol, 17 g, Daily PRN       Continuous          Labs:   CBC    Recent Labs     12/24/24 2157 12/24/24 2208 12/25/24  0512   WBC 11.94*  --  10.31*   HGB 10.4* 10.9* 9.8*   HCT 33.7* 32* 32.0*     --  182     BMP    Recent Labs     12/24/24 2157 12/24/24 2208 12/25/24  0512   SODIUM 143  --  144   K 4.3  --  4.6     --  105   CO2 33* 32 29   AGAP 8  --  10   BUN 19  --  20   CREATININE 1.07  --  0.71   CALCIUM 9.3  --  8.4       Coags    No recent results     Additional Electrolytes  Recent Labs     12/24/24 2157 12/24/24 2208 12/25/24  0512   MG 2.4  --  2.4   PHOS  --   --  4.6*   CAIONIZED  --  1.23  --           Blood Gas    No recent results  No recent results LFTs  No recent results    Infectious  No recent results  Glucose  Recent Labs     12/24/24 2157 12/25/24  0512   GLUC 96 90

## 2024-12-27 NOTE — PLAN OF CARE
Problem: RESPIRATORY - ADULT  Goal: Achieves optimal ventilation and oxygenation  Description: INTERVENTIONS:  - Assess for changes in respiratory status  - Assess for changes in mentation and behavior  - Position to facilitate oxygenation and minimize respiratory effort  - Oxygen administered by appropriate delivery if ordered  - Initiate smoking cessation education as indicated  - Encourage broncho-pulmonary hygiene including cough, deep breathe, Incentive Spirometry  - Assess the need for suctioning and aspirate as needed  - Assess and instruct to report SOB or any respiratory difficulty  - Respiratory Therapy support as indicated  Outcome: Progressing     Problem: Prexisting or High Potential for Compromised Skin Integrity  Goal: Skin integrity is maintained or improved  Description: INTERVENTIONS:  - Identify patients at risk for skin breakdown  - Assess and monitor skin integrity  - Assess and monitor nutrition and hydration status  - Monitor labs   - Assess for incontinence   - Turn and reposition patient  - Assist with mobility/ambulation  - Relieve pressure over bony prominences  - Avoid friction and shearing  - Provide appropriate hygiene as needed including keeping skin clean and dry  - Evaluate need for skin moisturizer/barrier cream  - Collaborate with interdisciplinary team   - Patient/family teaching  - Consider wound care consult   Outcome: Progressing     Problem: PAIN - ADULT  Goal: Verbalizes/displays adequate comfort level or baseline comfort level  Description: Interventions:  - Encourage patient to monitor pain and request assistance  - Assess pain using appropriate pain scale  - Administer analgesics based on type and severity of pain and evaluate response  - Implement non-pharmacological measures as appropriate and evaluate response  - Consider cultural and social influences on pain and pain management  - Notify physician/advanced practitioner if interventions unsuccessful or patient  reports new pain  Outcome: Progressing     Problem: INFECTION - ADULT  Goal: Absence or prevention of progression during hospitalization  Description: INTERVENTIONS:  - Assess and monitor for signs and symptoms of infection  - Monitor lab/diagnostic results  - Monitor all insertion sites, i.e. indwelling lines, tubes, and drains  - Monitor endotracheal if appropriate and nasal secretions for changes in amount and color  - [place] appropriate cooling/warming therapies per order  - Administer medications as ordered  - Instruct and encourage patient and family to use good hand hygiene technique  - Identify and instruct in appropriate isolation precautions for identified infection/condition  Outcome: Progressing     Problem: INFECTION - ADULT  Goal: Absence of fever/infection during neutropenic period  Description: INTERVENTIONS:  - Monitor WBC    Outcome: Progressing     Problem: SAFETY ADULT  Goal: Patient will remain free of falls  Description: INTERVENTIONS:  - Educate patient/family on patient safety including physical limitations  - Instruct patient to call for assistance with activity   - Consult OT/PT to assist with strengthening/mobility   - Keep Call bell within reach  - Keep bed low and locked with side rails adjusted as appropriate  - Keep care items and personal belongings within reach  - Initiate and maintain comfort rounds  - Make Fall Risk Sign visible to staff  - Offer Toileting every 1 Hours, in advance of need  - Initiate/Maintain chair/bed alarm  - Obtain necessary fall risk management equipment:   - Apply yellow socks and bracelet for high fall risk patients  - Consider moving patient to room near nurses station  Outcome: Progressing     Problem: SAFETY ADULT  Goal: Maintain or return to baseline ADL function  Description: INTERVENTIONS:  -  Assess patient's ability to carry out ADLs; assess patient's baseline for ADL function and identify physical deficits which impact ability to perform ADLs  (bathing, care of mouth/teeth, toileting, grooming, dressing, etc.)  - Assess/evaluate cause of self-care deficits   - Assess range of motion  - Assess patient's mobility; develop plan if impaired  - Assess patient's need for assistive devices and provide as appropriate  - Encourage maximum independence but intervene and supervise when necessary  - Involve family in performance of ADLs  - Assess for home care needs following discharge   - Consider OT consult to assist with ADL evaluation and planning for discharge  - Provide patient education as appropriate  Outcome: Progressing     Problem: SAFETY ADULT  Goal: Maintains/Returns to pre admission functional level  Description: INTERVENTIONS:  - Perform AM-PAC 6 Click Basic Mobility/ Daily Activity assessment daily.  - Set and communicate daily mobility goal to care team and patient/family/caregiver.   - Collaborate with rehabilitation services on mobility goals if consulted      - Out of bed for toileting  - Record patient progress and toleration of activity level   Outcome: Progressing     Problem: SAFETY,RESTRAINT: NV/NON-SELF DESTRUCTIVE BEHAVIOR  Goal: Returns to optimal restraint-free functioning  Description: INTERVENTIONS:  - Assess the patient's behavior and symptoms that indicate continued need for restraint  - Identify and implement measures to help patient regain control  - Assess readiness for release of restraint   Outcome: Progressing

## 2024-12-27 NOTE — DISCHARGE SUMMARY
"Discharge Summary - Critical Care/ICU   Name: Hemanth Swanson 39 y.o. male I MRN: [id number]  Unit/Bed#: ICU 05 I Date of Admission: 12/16/2024   Date of Service: 12/27/2024 I Hospital Day: 11    Admission Date: 12/16/2024 0730  Discharge Date: 12/27/24  Admitting Diagnosis: Sepsis ([facility]) [A41.9]  Discharge Diagnosis:   Medical Problems       Resolved Problems  Date Reviewed: 12/26/2024          Resolved    Hypokalemia 12/22/2024     Resolved by  Julissa Dorman V, CRNP    SIRS (systemic inflammatory response syndrome) ([facility]) 12/18/2024     Resolved by  Julissa Dorman V, MINENP    Sepsis ([facility]) 12/25/2024     Resolved by  Tiffani Clarke, AMBER    Pneumonia due to Pseudomonas and Serratia 12/25/2024     Resolved by  Tiffani Clarke, MINENP    Hypernatremia 12/21/2024     Resolved by  Leny Cook PA-C    Hematuria 12/22/2024     Resolved by  Julissa Dorman V, AMBER          HPI: \"Hemanth Swanson is a 39 y.o. who presented to [facility] for increased agitation and concerns for infection. Patient has a past medical history of neuromuscular disease status post trach and PEG, chronic respiratory failure, HFpEF, diabetes, hypertension and history of testicular cancer status post resection and chemo.      Patient was admitted to [facility] 10/5 to 10/31.  During that hospitalization he was extensively evaluated for his underlying neuromuscular disease in addition for his toxic metabolic encephalopathy. He had multiple bradycardic events during that admission secondary to vasovagal stimulation and even one cardiac arrest.      Patient resides at home with his family who reports he had increasing agitation and was sent to [facility] for further evaluation.  He required 8 mg IM Versed +2 mg IV Versed.  CT imaging was obtained revealing complete collapse of the left lower lobe and severe atelectasis of the right lower lobe similar to prior exam in October.\"    Procedures Performed: No orders of the defined " "types were placed in this encounter.      Summary of Hospital Course:   Once admitted to the ICU, Hemanth's sputum grew pseudomonas and serratia. He was treated with a course of cefepime. He never had significant fever or leukocytosis. Initially he had secretions that required frequent suctioning but now secretions are minimal. He has been receiving chest physiotherapy, mucomyst and duonebs. He has been stable on his home ventilator settings throughout most of this hospitalization. While admitted, he has increasing episodes of agitation and episodes of rhythmic shaking, concerning for seizure activity however patient was interactive through shaking events. With the shaking events, he was noted to have high peak pressures and would sometimes desaturate briefly. After discussing with family, patient has similar behavior at home including agitation, shaking and \"breath holding\" which they feel are behavioral. Hemanth has had multiple prolonged EEGs in the past without evidence of seizure activity. Given his agitation, he was started on seroquel. He also required precedex and restraints at times for safety of lines, tubes and staff. He had episodes where he attempted to hit, kick and spit at staff. Psychiatry and Neurology were consulted this stay. Psychiatry agreed with seroquel and recommended uptitration of dosing as tolerated. Neuro had no further recommendations given multiple extensive workups for similar events in the past. Family acknowledge that Hemanth is likely suffering from depression given his circumstances and that he does have a history of Bipolar disorder. They understand that we continue to recommend a skilled nursing facility for outpatient management of all of Hemanth's complex medical care. A facility would allow for more close follow up of new medications including seroquel as well as continued ventilator management. Patient's sister, Mirella, was updated on discharge instructions at length. We " discussed finding outpatient psychiatry to monitor and prescribe further medications to help with mood/behavior. She is aware that we will not prescribe refills for the seroquel after 30 days. Other discharge instructions were discussed at length including follow up BMP (to assess for hypernatremia) and follow up with all his his medical team including PCP, pulmonary, urology. Home health agency was consulted and patient is to resume PT/OT. RT agency is aware of discharge and will continue to provide routine home care. Patient is medically stable for discharge but continues to have ongoing high level of medical complexity.           Significant Findings, Care, Treatment and Services Provided:   12/26 video barium - puree and thins  12/18 sputum culture- pseudomonas and serratia. D/C vanco start ceftriaxone   12/17: start vanco 1/2 BC GPC + SC GPC  12/16: CTH negative  12/16: CT c/a/p Complete collapse of the left lower lobe and severe atelectasis of the right lower lobe similar to prior examination. Superimposed aspiration or pneumonia cannot be excluded. Trace left pleural effusion.  2.  2 mm calculus in the distal right ureter just prior to the ureterovesicular junction. No significant hydronephrosis.    Complications: None    Condition at Discharge: fair       Discharge instructions/Information to patient and family:   See After Visit Summary (AVS) for information provided to patient and family.      Provisions for Follow-Up Care:  See after visit summary for information related to follow-up care and any pertinent home health orders.      PCP: Ela Douglas MD    Disposition: Home    Planned Readmission: No     Discharge Medications:  See after visit summary for reconciled discharge medications provided to patient and family.      Discharge Statement:  I have spent a total time of 45 minutes in caring for this patient on the day of the visit/encounter. .

## 2024-12-27 NOTE — ASSESSMENT & PLAN NOTE
Patient with history of waxing and waning agitation   CT negative  UDS + THC, benzos   MRI during previous admission revealed images consistent with anoxic brain injury  Hypernatremia now corrected  12/23: Seroquel started  12/24: Seroquel increased  12/25: Seroquel increased  12/26: psych re-consulted     Plan:  Seroquel 100 in AM and 150 HS

## 2024-12-27 NOTE — CASE MANAGEMENT
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) 2024     Seizure-like activity ([facility]) 2024     Bradycardia 2024     Ventilator dependent ([facility]) 2024     Obesity hypoventilation syndrome (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) 2023     S/P percutaneous endoscopic gastrostomy (PEG) tube placement ([facility]) 2023     Anxiety 2023     Acute on chronic respiratory failure with hypoxia and hypercapnia ([facility]) 2023     Acute pulmonary embolism without acute cor pulmonale ([facility]) 2023     Depression 2023     History of LVH (left ventricular hypertrophy) 2023     Pure hypertriglyceridemia 2023     Unilateral vestibular schwannoma ([facility]) 2023     Port-A-Cath in place 2023     Diplopia 2023     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[facility]) 2023     Umbilical hernia without obstruction and without gangrene 12/10/2022     Tobacco use 12/10/2022     Retroperitoneal lymphadenopathy 12/10/2022       LOS (days): 11  Geometric Mean LOS (GMLOS) (days):   Days to GMLOS:     OBJECTIVE:  Risk of Unplanned Readmission  Score: 39.33     Current admission status: Inpatient   Preferred Pharmacy:   [id number] - [place], [place] - [address]  [address] [address]  Phone: [phone] Fax: [phone]    Primary Care Provider: Ela Douglas MD    Primary Insurance: [facility]  Secondary Insurance:     DISCHARGE DETAILS:    Other Referral/Resources/Interventions Provided:  Interventions: Outpatient   Referral Comments: SAHIL contacted Zayra Merino via In-Basket to inquire about Outpatient Care Coordinator follow up.  Per Zayra pt has been followed by Keshia Salinas RN in their office and she recommended SW contact her directly.  SAHIL sent EPIC Secure Chat to Keshia Salinas RN to provide discharge details including home care SOC delay and offered further information if needed.    Treatment Team Recommendation: SNF  Discharge Destination Plan:: Home with Home Health Care  Transport at Discharge : Other (Comment) (SCTU)

## 2024-12-27 NOTE — CASE MANAGEMENT
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/2024     Palliative care by specialist 10/11/2024     Goals of care, counseling/discussion 10/11/2024     Toxic metabolic encephalopathy 10/07/2024     Cardiac arrest due to other underlying condition ([facility]) 2024     Seizure-like activity ([facility]) 2024     Bradycardia 2024     Ventilator dependent ([facility]) 2024     Obesity hypoventilation syndrome ([facility]) 10/24/2023     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     Psychophysiological insomnia 10/18/2023     Impaired mobility 2023     Status post tracheostomy ([facility]) 2023     S/P percutaneous endoscopic gastrostomy (PEG) tube placement ([facility]) 2023     Anxiety 2023     Acute on chronic respiratory failure with hypoxia and hypercapnia ([facility]) 2023     Acute pulmonary embolism without acute cor pulmonale ([facility]) 2023     Depression 2023     History of LVH (left ventricular hypertrophy) 2023     Pure hypertriglyceridemia 2023     Unilateral vestibular schwannoma ([facility]) 2023     Port-A-Cath in place 2023     Diplopia 2023     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[facility]) 2023     Umbilical hernia without obstruction and without gangrene 12/10/2022     Tobacco use 12/10/2022     Retroperitoneal lymphadenopathy 12/10/2022       LOS (days): 11  Geometric Mean LOS (GMLOS) (days):   Days to GMLOS:     OBJECTIVE:  Risk of Unplanned Readmission  Score: 35.8     Current admission status: Inpatient   Preferred Pharmacy:   [id number] - [place], [place] - [address]  [address] [address]  Phone: [phone] Fax: [phone]    Primary Care Provider: Ela Douglas MD    Primary Insurance: [facility]  Secondary Insurance:     DISCHARGE DETAILS:    Discharge planning discussed with:: Dmitry Harrismanuelsister simons  Freedom of Choice: Yes  Comments - Freedom of Choice: See below  CM contacted family/caregiver?: Yes (with CRNP)    Contacts  Patient Contacts: Dmitry Swanson (sister)  Relationship to Patient:: Family  Contact Method: Phone  Phone Number: [phone]  Reason/Outcome: Discharge Planning    Other Referral/Resources/Interventions Provided:  Interventions: HHC, DME  Referral Comments: SW placed call to [facility] to discuss pt's plan to discharge home today.  SAHIL notified agency through to-BBB yesterday but message was not read.  SAHIL spoke with Vivek Zapata, one of the home care agency staff that responded on referral earlier this week.  Per Ms. Benny start of care for therapy will be 1/3/25 and for nursing 1/6/25 due to availability.  SW explained pt's needs and requested earlier SOC but per agency they will not be able to start any earlier. ICU CRNP was on phone with pt's sister during call with home care agency.  CRNP explained above to sister.  Sister verbalized understanding of delayed SOC and was accepting of plan.  SAHIL also spoke with Hilario Meraz at [facility] regarding nebulizer need. Hilario said he was very familiar with pt and pt's RT is [facility].  Per Hilario pt receives RT home visits monthly.  Hilario said he would talk with [facility] to confirm pt has nebulizer and email SW with [facility]'s response.  Pt's sister is aware of discharge timeframe of 1:00 pm today and is prepared for pt at home.  No other discharge needs expressed by sister.    Treatment Team Recommendation: SNF  Discharge  Destination Plan:: Home with Home Health Care  Transport at Discharge : Other (Comment) (SCTU)    Number/Name of Dispatcher: Roundtrip  Transported by (Company and Unit #): [employer]  ETA of Transport (Date): 12/27/24  ETA of Transport (Time): 1300    Additional Comments: At this time there are no other home care agencies that service [facility] that are willing to accept case when referral was made.

## 2024-12-27 NOTE — DISCHARGE INSTR - AVS FIRST PAGE
Please schedule follow up appointments with your PCP to help manage sodium levels. We will provide a prescription for blood work to check sodium level in 2 weeks. Please continue to provide tube feeds as prescribed (Jevity 1.5 at 70mL/hr. Provide 150mL of water every 4 hours to provide enough hydration to prevent high sodium levels).     Please schedule an appointment with a psychiatrist to help monitor and prescribe medications for mood/agitation. Seroquel prescription will be sent at discharge for a 30 day script but we will not renew as we have no way to monitor effects once out of the hospital.     Please follow up with Dr. Barksdale (Pulmonary) for routine ventilator management. We are sending prescriptions for nebulizer treatments to be given via the ventilator to assist with secretions.     Please follow up with Urology to review and made recommendations for hormone labs that we betsy inpatient. No urgency at this time.

## 2024-12-27 NOTE — NURSING NOTE
Discharge instructions including medications, wound care, scripts, follow up instructions reviewed indepth via telephone by Makenzie CLARK with patient's sister Dmitry Swanson who is patient's primary care giver.   Patient discharged to home with ACLS SLERAUL teram on vent via trach. Extra trach and inner cannula accompanied. Patient.

## 2024-12-27 NOTE — TELEPHONE ENCOUNTER
Pt admitted, but being d/c. Nurse practitioner from ICU called to review some d/c info. Pt hospitalized for pneumonia, vented at baseline. Their concern is worsening agitation and behavioral issues at home. Seroquel was started during admission. Family understands they need to find and f/u with a psychiatrist for this rx. Can  manage the rx until family can establish with a psychiatrist? NP put in orders for f/u labs in two weeks due to hyponatremia. Would like us to f/u with the pt's family in two weeks to see if labs were done.

## 2024-12-27 NOTE — WOUND OSTOMY CARE
"Progress Note - Wound   Hemanth Swanson 39 y.o. male MRN: [id number]  Unit/Bed#: ICU 05 Encounter: [id number]        Assessment:   This is a new consult for this 39 year old male patient admitted on 12/16/24 with acute on chronic respiratory failure. Wound care visit requested for \"pressure injuries on left posterior neck and right ear\".    Assessment Findings:  1-Intact area of blanchable erythema to left posterior neck due to trach ties - this is not a wound.  2-Right ear intact with blanchable erythema  3-Left ear with unstageable pressure injury POA - orders in place for wound care.  4-Traumatic wound to right posterior arm and left posterior hand - orders in place for wound care.  5-Healed wounds to right medial foot and right heel, right knee, bilateral groin and MASD sacrobuttocks (all present on admission). Orders in place for skin care and for prevention.    Wound Care Plan:  1-Cleanse wound to right posterior arm with NSS & pat dry. Apply betadine to wound daily (not necessary to apply cover dressing).  2-Cleanse wound to left posterior hand with NSS & pat dry. Apply betadine to wound, cover with 1/2 ABD, rolled gauze and tape. Change 3x/week and PRN.  3-Cleanse wound to left outer ear with NSS & pat dry. Take 4x4 foam dressing, cut to shape of ear then apply to outer ear wound. Change 3x/week and prn soilage/dislodgement.  4-Apply Prevent barrier cream to bilateral sacrobuttocks, healed wound to right medial foot and heels BID and PRN  5-Heel lift boots to be worn to bilateral heels at all times in bed and after transfer to reclining chair if able. If patient unable to tolerate, must float heels on 2 pillows to offload pressure so heels are not in contact with mattress or pillows.  6-Ehob pressure redistribution cushion in chair when out of bed if able. Avoid prolonged sitting.   7-Moisturize skin daily with skin nourishing cream.  8-Turn/reposition q2h or when medically stable for pressure " re-distribution on skin.     Wound 10/08/24 Traumatic Skin tear Arm Posterior;Right (Active)   Wound Image   12/27/24 1114   Wound Description Dry;Brown;Eschar 12/27/24 1114   Donna-wound Assessment Intact 12/27/24 1114   Wound Length (cm) 0.7 cm 12/27/24 1114   Wound Width (cm) 0.3 cm 12/27/24 1114   Wound Depth (cm) 0.1 cm 12/27/24 1114   Wound Surface Area (cm^2) 0.21 cm^2 12/27/24 1114   Wound Volume (cm^3) 0.021 cm^3 12/27/24 1114   Calculated Wound Volume (cm^3) 0.02 cm^3 12/27/24 1114   Change in Wound Size % 93.75 12/27/24 1114   Drainage Amount None 12/27/24 1114   Non-staged Wound Description Full thickness 12/27/24 1114   Treatments Cleansed 12/27/24 1114   Dressing Betadine 12/27/24 1114   Dressing Changed New 12/27/24 1114   Dressing Status Intact 12/27/24 1114       Wound 12/16/24 Hand Left;Posterior (Active)   Wound Image   12/27/24 1105   Wound Description Dry;Brown;Eschar 12/27/24 1105   Donna-wound Assessment Intact;Erythema 12/27/24 1105   Wound Length (cm) 1.8 cm 12/27/24 1105   Wound Width (cm) 1.2 cm 12/27/24 1105   Wound Depth (cm) 0.1 cm 12/27/24 1105   Wound Surface Area (cm^2) 2.16 cm^2 12/27/24 1105   Wound Volume (cm^3) 0.216 cm^3 12/27/24 1105   Calculated Wound Volume (cm^3) 0.22 cm^3 12/27/24 1105   Drainage Amount Scant 12/27/24 1105   Drainage Description Serosanguineous 12/27/24 1105   Non-staged Wound Description Full thickness 12/27/24 1105   Treatments Cleansed 12/27/24 1105   Dressing Betadine;ABD;Dry dressing 12/27/24 1105   Dressing Changed New 12/27/24 1105   Dressing Status Clean;Dry;Intact 12/27/24 1105       Wound 12/16/24 Foot Medial;Right (Active)   Wound Image   12/27/24 1123   Wound Description Epithelialization 12/27/24 1123   Wound Length (cm) 0.0 cm 12/27/24 1123   Wound Width (cm) 0.0 cm 12/27/24 1123   Wound Depth (cm) 0 cm 12/27/24 1123   Wound Surface Area (cm^2) 0.0 cm^2 12/27/24 1123   Wound Volume (cm^3) 0 cm^3 12/27/24 1123   Calculated Wound Volume (cm^3) 0  cm^3 12/27/24 1123   Drainage Amount None 12/27/24 1123   Non-staged Wound Description Not applicable 12/27/24 1123   Treatments Cleansed 12/27/24 1123   Dressing Protective barrier 12/27/24 1123   Wound packed? No 12/24/24 1123   Dressing Changed New 12/24/24 1123   Dressing Status Intact 12/27/24 1123       Wound 12/16/24 Heel Right (Active)   Wound Image   12/27/24 1124   Wound Description Epithelialization 12/27/24 1124   Wound Length (cm) 0.0 cm 12/27/24 1124   Wound Width (cm) 0.0 cm 12/27/24 1124   Wound Depth (cm) 0 cm 12/27/24 1124   Wound Surface Area (cm^2) 0.0 cm^2 12/27/24 1124   Wound Volume (cm^3) 0 cm^3 12/27/24 1124   Calculated Wound Volume (cm^3) 0 cm^3 12/27/24 1124   Drainage Amount None 12/27/24 1124   Treatments Cleansed 12/27/24 1124   Dressing Protective barrier 12/27/24 1124   Dressing Changed New 12/24/24 1124   Dressing Status Intact 12/27/24 1124       Wound 12/16/24 Knee Anterior;Right (Active)   Wound Image   12/27/24 1119   Wound Description Epithelialization 12/27/24 1119   Wound Length (cm) 0 cm 12/27/24 1119   Wound Width (cm) 0 cm 12/27/24 1119   Wound Depth (cm) 0 cm 12/27/24 1119   Wound Surface Area (cm^2) 0 cm^2 12/27/24 1119   Wound Volume (cm^3) 0 cm^3 12/27/24 1119   Calculated Wound Volume (cm^3) 0 cm^3 12/27/24 1119   Drainage Amount None 12/27/24 1119   Non-staged Wound Description Not applicable 12/27/24 1119   Treatments Cleansed 12/27/24 1119   Dressing Protective barrier 12/27/24 1119   Dressing Changed New 12/27/24 1119   Dressing Status Intact 12/27/24 1119       Wound 12/17/24 Pressure Injury Ear Left (Active)   Wound Image   12/27/24 1103   Wound Description Slough;Yellow 12/27/24 1103   Pressure Injury Stage Unstageable 12/27/24 1103   Donna-wound Assessment Intact 12/27/24 1103   Wound Length (cm) 0.7 cm 12/27/24 1103   Wound Width (cm) 0.3 cm 12/27/24 1103   Wound Depth (cm) 0.1 cm 12/27/24 1103   Wound Surface Area (cm^2) 0.21 cm^2 12/27/24 1103   Wound Volume  (cm^3) 0.021 cm^3 12/27/24 1103   Calculated Wound Volume (cm^3) 0.02 cm^3 12/27/24 1103   Drainage Amount None 12/27/24 1103   Treatments Cleansed 12/27/24 1103   Dressing Foam 12/27/24 1103   Dressing Changed Changed 12/27/24 1103   Dressing Status Clean;Dry;Intact 12/27/24 1103       Wound 12/26/24 Other (comment) Neck Posterior (Active)   Wound Image  This is an intact area of blanchable erythema from trach ties - this is not a wound. 12/27/24 0800     Discussed assessment findings, and plan of care/recommendations with Starr JEONG.    Patient is for discharge today and will be followed by [facility], please call or text with questions and concerns.    Recommendations written as orders.  Irma David MSN, RN, CWON

## 2024-12-28 LAB
TESTOST FREE SERPL-MCNC: 2.8 PG/ML (ref 8.7–25.1)
TESTOST SERPL-MCNC: 40 NG/DL (ref 264–916)

## 2024-12-30 ENCOUNTER — TRANSITIONAL CARE MANAGEMENT (OUTPATIENT)
Dept: FAMILY MEDICINE CLINIC | Facility: CLINIC | Age: 39
End: 2024-12-30

## 2024-12-30 ENCOUNTER — PATIENT OUTREACH (OUTPATIENT)
Dept: CASE MANAGEMENT | Facility: OTHER | Age: 39
End: 2024-12-30

## 2024-12-30 ENCOUNTER — TELEPHONE (OUTPATIENT)
Dept: FAMILY MEDICINE CLINIC | Facility: CLINIC | Age: 39
End: 2024-12-30

## 2024-12-30 NOTE — TELEPHONE ENCOUNTER
Per Maureen, we will have to wait until the patient is able to reach out to our office himself due to HIPAA and lack of completion of communication consent form.

## 2024-12-30 NOTE — TELEPHONE ENCOUNTER
"I reached out to patient's provided contact number to collect answers for his TCM questions. Upon reviewing his most recent communication consent form, it is blank with his signature. A woman answered the phone and stated that she was his sister. I requested to speak to patient, she stated that he has a trach and is on vent. According to his communication consent form, I am not given permission to speak to anybody other than himself. His health care proxy is listed as Dmitry Swanson, but says \"not active\". Please advise.   "

## 2024-12-30 NOTE — PROGRESS NOTES
"Patient discharged home 12/27 with [facility].I spoke with Dmitry patient's sister and caregiver who reports Hemanth is doing well and is back to his baseline. She reports she and her grandmother have taken care of Hemanth \"for years\". She stated he need physical therapy only. Medication changes were reviewed.  Dmitry reports she will speak with Hemanth's PCP concerning the changes.  She reports he has no needs. She took my contact information and repeated it to me. I advised her to call me if she needs assistance.  She declined further outreach.  "

## 2025-01-06 NOTE — TELEPHONE ENCOUNTER
"Will fwd to mgr.      The message below was not sent to me or otherwise communicated to me, but I noticed the message when reviewing to see if pt has TCM visit set up.  Waiting until the patient can \"reach out to the office himself\" is NOT feasible.  The patient is trach/vent dependent which limits his verbal communication.  He has very limited mobility, and is not able to  independently reach out to the office.      He is at high risk of readmission.       His sister, Dmitry, is his power of  and primary caretaker.  She is the point of contact for his medical care.      I see the patient's Financial Power of  paperwork scanned.  If medical power of  paperwork is not scanned and we need this, please contact Dmitry to ask for this to be sent.     Patient needs TCM visit.  Virtual is ok for this as it is difficult for family to bring him in (needs ambulance transport on stretcher to be able to come to the office).    "

## 2025-01-07 NOTE — TELEPHONE ENCOUNTER
Schedule Hemanth for a hospital follow up. Also Dmitry did bring in a POA but unable to access it. I asked if she could bring it to the office at some point so we can scan it. Dmitry agreed to bring it by the office.

## 2025-01-15 ENCOUNTER — TELEMEDICINE (OUTPATIENT)
Dept: FAMILY MEDICINE CLINIC | Facility: CLINIC | Age: 40
End: 2025-01-15

## 2025-01-15 DIAGNOSIS — A41.9 SEPSIS WITH ACUTE HYPOXIC RESPIRATORY FAILURE WITHOUT SEPTIC SHOCK, DUE TO UNSPECIFIED ORGANISM (HCC): Primary | ICD-10-CM

## 2025-01-15 DIAGNOSIS — F32.A DEPRESSION, UNSPECIFIED DEPRESSION TYPE: Chronic | ICD-10-CM

## 2025-01-15 DIAGNOSIS — J96.01 SEPSIS WITH ACUTE HYPOXIC RESPIRATORY FAILURE WITHOUT SEPTIC SHOCK, DUE TO UNSPECIFIED ORGANISM (HCC): Primary | ICD-10-CM

## 2025-01-15 DIAGNOSIS — R65.20 SEPSIS WITH ACUTE HYPOXIC RESPIRATORY FAILURE WITHOUT SEPTIC SHOCK, DUE TO UNSPECIFIED ORGANISM (HCC): Primary | ICD-10-CM

## 2025-01-15 DIAGNOSIS — R45.1 AGITATION: ICD-10-CM

## 2025-01-15 RX ORDER — QUETIAPINE FUMARATE 150 MG/1
150 TABLET, FILM COATED ORAL
Qty: 30 TABLET | Refills: 1 | Status: SHIPPED | OUTPATIENT
Start: 2025-01-15

## 2025-01-15 NOTE — ASSESSMENT & PLAN NOTE
Doing well w seroquel at night  Stopped daytime dose due to sedation  Will make f/u psych appointment  Refill sent      Orders:  •  QUEtiapine Fumarate 150 MG TABS; Take 150 mg by mouth daily at bedtime  •  Ambulatory referral to Psych Services; Future

## 2025-01-15 NOTE — PROGRESS NOTES
Virtual TCM Visit:    Verification of patient location:    Patient is located at Home in the following state in which I hold an active license [place]    Assessment & Plan  Sepsis with acute hypoxic respiratory failure without septic shock, due to unspecified organism (HCC)  Treated with cefepime.   Sputum with serratia and pseudomonas  Completed abx course  Was stable on home vent settings through hospitalization and upon d/c  Has pulm f/u  No fevers or desaturations at home       Agitation  Doing well w seroquel at night  Stopped daytime dose due to sedation  Will make f/u psych appointment  Refill sent      Orders:  •  QUEtiapine Fumarate 150 MG TABS; Take 150 mg by mouth daily at bedtime  •  Ambulatory referral to Psych Services; Future    Depression, unspecified depression type  Continuing on seroquel at night  Daytime dose was d/c'd by pt/family due to sedation  Orders:  •  Ambulatory referral to Psych Services; Future           Encounter provider Ela Douglas MD     Provider located at [facility] [place] 2[place]  [address] [place] [address] [address]  [facility] [address] [phone]    After connecting through [employer], the patient was identified by name and date of birth. Hemanth Swanson was informed that this is a telemedicine visit and that the visit is being conducted through the Epic Embedded platform. He agrees to proceed..  My office door was closed. No one else was in the room.  He acknowledged consent and understanding of privacy and security of the video platform. The patient has agreed to participate and understands they can discontinue the visit at any time.    Patient is aware this is a billable service.    History of Present Illness     Transitional Care Management Review:   Hemanth Swanson is a 39 y.o. male here for TCM follow up.    During the TCM phone call patient stated:  TCM Call     Date and time call was made  12/30/2024  3:00 PM    Hospital care reviewed  Records  reviewed    Patient was hospitialized at  [facility]    Date of Admission  12/16/24    Date of discharge  12/27/24    Diagnosis  Sepsis    Disposition  Home    Were the patients medications reviewed and updated  Yes    Current Symptoms  None      TCM Call     Post hospital issues  None    Should patient be enrolled in anticoag monitoring?  No    Scheduled for follow up?  Yes        HPI  Pt present at home in bed on vent.  Dmitry (sister and POA) present to review info and facilitate visit.    Is able to raise hand to wave hello and can give me a thumbs up or thumbs down in conversation    Dmitry notes pt is doing well.  Has been stable since he's been at home.  Less agitation and is sleeping better at night since being on seroquel.  Not desatting at night.  Stopped AM seroquel as he was too tired to participate in home PT.  Not having trouble with daytime agitation- attributes this to stress with hospitalization as well as illness.    His urine no longer has a strong smell and looks normal  Having regular Bms    O2 sats consistently 92-99  No cough w/ eating purees and drinking fluids.           Review of Systems  Objective   There were no vitals taken for this visit.    Physical Exam  Vitals reviewed. Nursing note reviewed: o2 sats 92% and above.  Constitutional:       Comments: In bed, on vent, chronically ill appearing   Skin:     Comments: Bilateral extensor PIPs with mild abrasions (from hospital stay, hitting arms against bed)   Neurological:      Comments: Able to move fingers/wrist on R but unable to lift arm from bed independently  On L- able to lift arm from bed and give thumbs up or down       Medications have been reviewed by provider in current encounter      Ela Douglas MD      VIRTUAL VISIT DISCLAIMER    Hemanth Swanson verbally agrees to participate in Virtual Care Services. Pt is aware that Virtual Care Services could be limited without vital signs or the ability to perform a full  hands-on physical exam. Hemanth Swanson understands he or the provider may request at any time to terminate the video visit and request the patient to seek care or treatment in person.

## 2025-01-15 NOTE — ASSESSMENT & PLAN NOTE
Continuing on seroquel at night  Daytime dose was d/c'd by pt/family due to sedation  Orders:  •  Ambulatory referral to Psych Services; Future

## 2025-01-17 PROBLEM — F51.04 PSYCHOPHYSIOLOGICAL INSOMNIA: Status: RESOLVED | Noted: 2023-10-18 | Resolved: 2025-01-17

## 2025-01-22 ENCOUNTER — TELEPHONE (OUTPATIENT)
Age: 40
End: 2025-01-22

## 2025-01-22 DIAGNOSIS — Z74.09 IMPAIRED MOBILITY: ICD-10-CM

## 2025-01-22 DIAGNOSIS — G70.9 NEUROMUSCULAR WEAKNESS (HCC): ICD-10-CM

## 2025-01-22 DIAGNOSIS — Z76.89 ENCOUNTER FOR WHEELCHAIR ASSESSMENT: Primary | ICD-10-CM

## 2025-01-22 DIAGNOSIS — G70.9 NEUROMUSCULAR DISORDER (HCC): ICD-10-CM

## 2025-01-22 NOTE — TELEPHONE ENCOUNTER
April with [facility] health OT called requesting a order for Wheelchair evaluation to be faxed to [facility] at [phone]. Patient has no use of his right arm, bedbound and is on a ventilator. If there is any questions April can be reached at [phone]

## 2025-01-23 NOTE — TELEPHONE ENCOUNTER
"April called back and we discussed the type of order needed for Hemanth. They need a script for a wheelchair evaluation. Went over options for referrals, April and I agreed that a referral for OT/PT Functional Capacity evaluation and comment \"wheelchair evaluation.\" Referral placed and faxed to [facility] at number provided.   "

## 2025-01-23 NOTE — TELEPHONE ENCOUNTER
Called April to ask what exactly they need. Do they need an order for a wheelchair or a referral of some sort. No answer. LM for call back.

## 2025-01-28 NOTE — TELEPHONE ENCOUNTER
"Call received from [facility] stating that they need a new scripts sent over stating \"wheelchair evaluation\" instead of functional capacity. New script needs to be faxed to [phone]  "

## 2025-01-30 ENCOUNTER — TELEPHONE (OUTPATIENT)
Age: 40
End: 2025-01-30

## 2025-01-30 NOTE — TELEPHONE ENCOUNTER
Phone call placed to [facility] main phone number since no  or phone number was obtained for the last phone call on 1/28/25. Was directed to the wheelchair clinic, no answer. LM for call back to see what type of order they need as wheelchair evaluation is not an option in [facility].

## 2025-01-30 NOTE — TELEPHONE ENCOUNTER
Patient's sister returning phone call. She is not on communication list but has Power of  for this patient. Sister stated patient is bed bound with a trachea on a vent 24 hours. Pt was referred to Psychiatry for his Psych medications. Per sister the appointments must be virtual. Dr. Cardenas please advice if patient can be seen.

## 2025-01-31 NOTE — TELEPHONE ENCOUNTER
Spoke with [facility] who states referral should be amb ref to PT with comments stating wheelchair evaluation. New referral placed. Faxed to [facility] at fax provided- [phone].

## 2025-02-03 NOTE — TELEPHONE ENCOUNTER
Will reach out to neurology, as it may be more appropriate for their team to manage his agitation in the setting of his neurologic illness. Thank you, waiting for their response.

## 2025-02-04 ENCOUNTER — TELEPHONE (OUTPATIENT)
Dept: NEUROLOGY | Facility: CLINIC | Age: 40
End: 2025-02-04

## 2025-02-09 ENCOUNTER — HOSPITAL ENCOUNTER (EMERGENCY)
Facility: HOSPITAL | Age: 40
Discharge: HOME | End: 2025-02-09
Attending: EMERGENCY MEDICINE | Admitting: EMERGENCY MEDICINE
Payer: COMMERCIAL

## 2025-02-09 ENCOUNTER — APPOINTMENT (EMERGENCY)
Dept: RADIOLOGY | Facility: HOSPITAL | Age: 40
End: 2025-02-09
Payer: COMMERCIAL

## 2025-02-09 VITALS
DIASTOLIC BLOOD PRESSURE: 106 MMHG | TEMPERATURE: 97.6 F | HEART RATE: 72 BPM | RESPIRATION RATE: 20 BRPM | SYSTOLIC BLOOD PRESSURE: 161 MMHG | OXYGEN SATURATION: 100 %

## 2025-02-09 DIAGNOSIS — K94.23 MALFUNCTION OF GASTROSTOMY TUBE (HCC): Primary | ICD-10-CM

## 2025-02-09 LAB — GLUCOSE SERPL-MCNC: 75 MG/DL (ref 65–140)

## 2025-02-09 PROCEDURE — 43762 RPLC GTUBE NO REVJ TRC: CPT | Performed by: PHYSICIAN ASSISTANT

## 2025-02-09 PROCEDURE — 99284 EMERGENCY DEPT VISIT MOD MDM: CPT | Performed by: PHYSICIAN ASSISTANT

## 2025-02-09 PROCEDURE — 74018 RADEX ABDOMEN 1 VIEW: CPT

## 2025-02-09 PROCEDURE — 99283 EMERGENCY DEPT VISIT LOW MDM: CPT

## 2025-02-09 PROCEDURE — 82948 REAGENT STRIP/BLOOD GLUCOSE: CPT

## 2025-02-09 RX ADMIN — IOHEXOL 40 ML: 300 INJECTION, SOLUTION INTRAVENOUS at 15:25

## 2025-02-09 NOTE — ED NOTES
Patients family member left, states she will be back before transportation is at bedside.     Stu Torres RN  02/09/25 [phone]

## 2025-02-09 NOTE — ED NOTES
Patients family made aware of ETA transportation time of 2000. Patient also received oral suction for comfort.      Stu Torres RN  02/09/25 [phone]

## 2025-02-09 NOTE — DISCHARGE INSTRUCTIONS
Make sure the particles you put in G tube are small and irrigate well to flush them through.  If it feels clogged, please do not force, but call your doctor.    Old, clogged G tube was removed and new G tube placed, irrigates well and balloon in place

## 2025-02-09 NOTE — ED PROVIDER NOTES
Time reflects when diagnosis was documented in both MDM as applicable and the Disposition within this note       Time User Action Codes Description Comment    2/9/2025  3:34 PM Shirley Blake Add [K94.23] Malfunction of gastrostomy tube (HCC)     2/9/2025  3:35 PM Shirley Blake Modify [K94.23] Malfunction of gastrostomy tube (HCC) Blocked and then     2/9/2025  3:35 PM Shirley Blake Modify [K94.23] Malfunction of gastrostomy tube (HCC) Blocked and longitudinal crack noted          ED Disposition       ED Disposition   Discharge    Condition   Stable    Date/Time   Sun Feb 9, 2025  3:34 PM    Comment   Hemanth Swanson discharge to home/self care.                   Assessment & Plan       Medical Decision Making  G tube replaced by me (16 Fr, 6mL balloon), confirmatory films done with cxr also showing new complete white out of left lung, pt septic.  Pt changed over to our vent for better management, labs ordered, antibiotics ordered, transfer started to ICU.  Attending involved in care.      Amount and/or Complexity of Data Reviewed  External Data Reviewed: notes.     Details: 12/10/2024  39-year-old male presenting for PEG tube replacement.  Patient is chronically vent dependent and lives at home.  He is known to the emergency department for respiratory issues and pulling out his PEG.  Family at bedside states that he pulled his PEG out accidentally earlier today.  Patient appears chronically unwell but family at bedside states this is normal appearance.  Radiology: ordered.             Medications   iohexol (OMNIPAQUE) 300 mg/mL injection 100 mL (40 mL Injection Given by Other 2/9/25 1525)       ED Risk Strat Scores                          SBIRT 20yo+      Flowsheet Row Most Recent Value   Initial Alcohol Screen: US AUDIT-C     1. How often do you have a drink containing alcohol? 0 Filed at: 02/09/2025 6668   2. How many drinks containing alcohol do you have on a typical day you are drinking?  0 Filed at:  "2025 1436   3a. Male UNDER 65: How often do you have five or more drinks on one occasion? 0 Filed at: 2025 143   Audit-C Score 0 Filed at: 2025 143   JEAN MARIE: How many times in the past year have you...    Used an illegal drug or used a prescription medication for non-medical reasons? Never Filed at: 2025 143                            History of Present Illness       Chief Complaint   Patient presents with    Medical Problem     Patient arrived by ambulance after family tried to flush feeding tube resulting in \"feeding tube bursting like a garden hose\" patient at baseline.        Past Medical History:   Diagnosis Date    Anxiety     Cancer (Formerly [facility])     Depression     Hypernatremia 10/06/2024    Migration of percutaneous endoscopic gastrostomy (PEG) tube (Formerly [facility]) 2023    Psychiatric disorder     bipolar    Sepsis (Formerly [facility]) 2023      Past Surgical History:   Procedure Laterality Date    IR BIOPSY LYMPH NODE  2023    IR GASTROSTOMY (G) TUBE CHECK/CHANGE/REINSERTION/UPSIZE  2024    IR GASTROSTOMY TUBE PLACEMENT  2023    IR LUMBAR PUNCTURE  2023    IR LUMBAR PUNCTURE  10/25/2024    IR PORT PLACEMENT  2023    ORCHIECTOMY Left 2022    Procedure: ORCHIECTOMY INGUINAL;  Surgeon: Flip Michael MD;  Location:  MAIN OR;  Service: Urology    PEG W/TRACHEOSTOMY PLACEMENT N/A 2023    Procedure: TRACHEOSTOMY WITH INSERTION PEG TUBE;  Surgeon: Rudy Mcmanus MD;  Location: [facility];  Service: General    TESTICLE SURGERY        Family History   Problem Relation Age of Onset    Coronary artery disease Maternal Aunt     Cancer Father     Diabetes Father     Hypertension Father       Social History     Tobacco Use    Smoking status: Former     Current packs/day: 0.00     Average packs/day: 0.7 packs/day for 22.7 years (15.0 ttl pk-yrs)     Types: Cigarettes     Start date: 2008     Quit date: 2023     Years since quittin.6     Passive exposure: " "Never    Smokeless tobacco: Never   Vaping Use    Vaping status: Former    Start date: 1/1/2018    Quit date: 6/1/2023    Substances: Nicotine, THC   Substance Use Topics    Alcohol use: Not Currently     Comment: 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 Not currently using crack cocaine.      E-Cigarette/Vaping    E-Cigarette Use Former User     Start Date 1/1/18     Quit Date 6/1/23       E-Cigarette/Vaping Substances    Nicotine Yes     THC Yes     CBD No     Flavoring No     Other No     Unknown No       I have reviewed and agree with the history as documented.     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  PSH: gastrostomy (PEG) tube, Left Orchiectomy, biopsy lymph node, IR port placement, lumbar puncture, PEG w/tracheostomy placement, Testicle surgery, IR gastrostomy (G) tube check/change/reinsertion/upsize     Patient brought in by EMS from home, chronic trach patient, on home vent, for further evaluation and treatment of G tube dysfunction.  Per EMS/family they were trying to irrigate, and it was clogged, so several people tried and then it \"exploded\".  Upon inspection, about 1cm laceration noted along mid-distal side of 16Fr G tube.  NO other complaints, no fever, no sob, pt otherwise at baseline          Review of Systems        Objective       ED Triage Vitals   Temperature Pulse Blood Pressure Respirations SpO2 Patient Position - Orthostatic VS   02/09/25 1436 02/09/25 1432 02/09/25 1432 02/09/25 1432 02/09/25 1432 02/09/25 1432   97.9 °F (36.6 °C) 81 121/77 20 100 % Lying      Temp Source Heart Rate Source BP Location FiO2 (%) Pain Score    02/09/25 1436 02/09/25 1432 02/09/25 1432 -- --    Axillary Monitor Right arm    "     Vitals      Date and Time Temp Pulse SpO2 Resp BP Pain Score FACES Pain Rating User   02/09/25 1436 97.9 °F (36.6 °C) -- -- -- -- -- -- GIBSON   02/09/25 1432 -- 81 100 % 20 121/77 -- -- DU            Physical Exam    Results Reviewed       None            XR abdomen 1 view kub   ED Interpretation by Shirley Blake PA-C (02/09 2037)   No contrast extravasation            Gastrostomy tube removal w/o sedation    Date/Time: 2/9/2025 3:27 PM    Performed by: Shirley Blake PA-C  Authorized by: Shirley Blake PA-C    Patient location:  ED  Other Assisting Provider: No    Consent:     Consent obtained:  Verbal (with family who's present)    Consent given by: family.    Risks discussed:  Bleeding, infection and pain (perforation)    Alternatives discussed:  Delayed treatment and referral  Universal protocol:     Procedure explained and questions answered to patient or proxy's satisfaction: yes      Patient identity confirmed:  Arm band  Pre-procedure details:     Old tube type:  Gastrostomy    Old tube size:  16 Fr    Gastrostomy site:  LUQ abd  Indications:     Indications: tube malfunction, tube blocked and tube cracked    Procedure details:     Patient position:  Supine    Procedure type:  Replacement    Tube type:  Gastrostomy    Tube size:  16 Fr    Bulb inflation volume:  6mL    Bulb inflation fluid:  Sterile water    External bolster to end of the tube (cm):  3  Post-procedure details:     Placement/position confirmation:  Gastric contents aspirated, contrast and x-ray    Placement difficulty:  None    Bleeding:  None    Patient tolerance of procedure:  Tolerated well, no immediate complications  Comments:      Had to remove old tube, this was moderately difficult to deflate balloon bc particles stuck in tube and difficulty deflating, ended up cutting tube and got some liquid out, then about to remove old tube and quickly replaced with new tube      ED Medication and Procedure Management   Prior to Admission  "Medications   Prescriptions Last Dose Informant Patient Reported? Taking?   Incontinence Supply Disposable (Comfort Shield Adult Diapers) MISC  Family Member No No   Sig: Use 1 each 4 (four) times a day   NEEDLE, DISP, 18 G 18G X 1\" MISC   No No   Sig: Use 2 (two) times a week   Oral Hygiene Products (Toothette Swabs/Dentifrice) SWAB  Family Member No No   Sig: Apply to the mouth or throat 3 (three) times a day   QUEtiapine Fumarate 150 MG TABS   No No   Sig: Take 150 mg by mouth daily at bedtime   Syringe/Needle, Disp, (Syringe Luer Slip) 25G X 5/8\" 1 ML MISC   No No   Sig: Use 2 (two) times a week   acetylcysteine (MUCOMYST) 200 mg/mL nebulizer solution   No No   Sig: Take 3 mL (600 mg total) by nebulization every 6 (six) hours   apixaban (ELIQUIS) 5 mg  Family Member No No   Si tablet (5 mg total) by Per PEG Tube route 2 (two) times a day   ipratropium-albuterol (DUO-NEB) 0.5-2.5 mg/3 mL nebulizer solution   No No   Sig: Take 3 mL by nebulization every 6 (six) hours      Facility-Administered Medications: None     Patient's Medications   Discharge Prescriptions    No medications on file     No discharge procedures on file.  ED SEPSIS DOCUMENTATION   Time reflects when diagnosis was documented in both MDM as applicable and the Disposition within this note       Time User Action Codes Description Comment    2025  3:34 PM Shirley Blake Add [K94.23] Malfunction of gastrostomy tube (HCC)     2025  3:35 PM Shirley Blake Modify [K94.23] Malfunction of gastrostomy tube (HCC) Blocked and then     2025  3:35 PM Shirley Blake Modify [K94.23] Malfunction of gastrostomy tube (HCC) Blocked and longitudinal crack noted                 Shirley Blake PA-C  [phone]    "

## 2025-02-13 ENCOUNTER — APPOINTMENT (EMERGENCY)
Dept: RADIOLOGY | Facility: HOSPITAL | Age: 40
End: 2025-02-13
Payer: COMMERCIAL

## 2025-02-13 ENCOUNTER — HOSPITAL ENCOUNTER (EMERGENCY)
Facility: HOSPITAL | Age: 40
End: 2025-02-14
Attending: EMERGENCY MEDICINE
Payer: COMMERCIAL

## 2025-02-13 DIAGNOSIS — Z43.1 PEG (PERCUTANEOUS ENDOSCOPIC GASTROSTOMY) ADJUSTMENT/REPLACEMENT/REMOVAL (HCC): ICD-10-CM

## 2025-02-13 DIAGNOSIS — T14.91XA SUICIDE ATTEMPT (HCC): ICD-10-CM

## 2025-02-13 DIAGNOSIS — J96.22 ACUTE ON CHRONIC RESPIRATORY FAILURE WITH HYPERCAPNIA (HCC): Primary | ICD-10-CM

## 2025-02-13 LAB
ALBUMIN SERPL BCG-MCNC: 4.3 G/DL (ref 3.5–5)
ALP SERPL-CCNC: 100 U/L (ref 34–104)
ALT SERPL W P-5'-P-CCNC: 21 U/L (ref 7–52)
ANION GAP SERPL CALCULATED.3IONS-SCNC: 4 MMOL/L (ref 4–13)
ANISOCYTOSIS BLD QL SMEAR: PRESENT
AST SERPL W P-5'-P-CCNC: 14 U/L (ref 13–39)
ATRIAL RATE: 75 BPM
BASE EX.OXY STD BLDV CALC-SCNC: 88.3 % (ref 60–80)
BASE EX.OXY STD BLDV CALC-SCNC: 97.4 % (ref 60–80)
BASE EXCESS BLDV CALC-SCNC: 4.8 MMOL/L
BASE EXCESS BLDV CALC-SCNC: 7.1 MMOL/L
BASO STIPL BLD QL SMEAR: PRESENT
BASOPHILS # BLD MANUAL: 0.08 THOUSAND/UL (ref 0–0.1)
BASOPHILS NFR MAR MANUAL: 1 % (ref 0–1)
BILIRUB SERPL-MCNC: 0.38 MG/DL (ref 0.2–1)
BILIRUB UR QL STRIP: NEGATIVE
BUN SERPL-MCNC: 16 MG/DL (ref 5–25)
CALCIUM SERPL-MCNC: 10.2 MG/DL (ref 8.4–10.2)
CHLORIDE SERPL-SCNC: 94 MMOL/L (ref 96–108)
CLARITY UR: CLEAR
CO2 SERPL-SCNC: 41 MMOL/L (ref 21–32)
COLOR UR: YELLOW
CREAT SERPL-MCNC: 0.41 MG/DL (ref 0.6–1.3)
EOSINOPHIL # BLD MANUAL: 0.16 THOUSAND/UL (ref 0–0.4)
EOSINOPHIL NFR BLD MANUAL: 2 % (ref 0–6)
ERYTHROCYTE [DISTWIDTH] IN BLOOD BY AUTOMATED COUNT: 15.1 % (ref 11.6–15.1)
FLUAV AG UPPER RESP QL IA.RAPID: NEGATIVE
FLUBV AG UPPER RESP QL IA.RAPID: NEGATIVE
GFR SERPL CREATININE-BSD FRML MDRD: 150 ML/MIN/1.73SQ M
GLUCOSE SERPL-MCNC: 85 MG/DL (ref 65–140)
GLUCOSE UR STRIP-MCNC: NEGATIVE MG/DL
HCO3 BLDV-SCNC: 33.9 MMOL/L (ref 24–30)
HCO3 BLDV-SCNC: 36.2 MMOL/L (ref 24–30)
HCT VFR BLD AUTO: 38.3 % (ref 36.5–49.3)
HGB BLD-MCNC: 11.5 G/DL (ref 12–17)
HGB UR QL STRIP.AUTO: NEGATIVE
KETONES UR STRIP-MCNC: NEGATIVE MG/DL
LACTATE SERPL-SCNC: 1.1 MMOL/L (ref 0.5–2)
LEUKOCYTE ESTERASE UR QL STRIP: NEGATIVE
LG PLATELETS BLD QL SMEAR: PRESENT
LIPASE SERPL-CCNC: 28 U/L (ref 11–82)
LYMPHOCYTES # BLD AUTO: 1.58 THOUSAND/UL (ref 0.6–4.47)
LYMPHOCYTES # BLD AUTO: 20 % (ref 14–44)
MACROCYTES BLD QL AUTO: PRESENT
MCH RBC QN AUTO: 29 PG (ref 26.8–34.3)
MCHC RBC AUTO-ENTMCNC: 30 G/DL (ref 31.4–37.4)
MCV RBC AUTO: 97 FL (ref 82–98)
MONOCYTES # BLD AUTO: 0.32 THOUSAND/UL (ref 0–1.22)
MONOCYTES NFR BLD: 4 % (ref 4–12)
MYELOCYTE ABSOLUTE CT: 0.32 THOUSAND/UL (ref 0–0.1)
MYELOCYTES NFR BLD MANUAL: 4 % (ref 0–1)
NEUTROPHILS # BLD MANUAL: 5.46 THOUSAND/UL (ref 1.85–7.62)
NEUTS SEG NFR BLD AUTO: 69 % (ref 43–75)
NITRITE UR QL STRIP: NEGATIVE
O2 CT BLDV-SCNC: 15.8 ML/DL
O2 CT BLDV-SCNC: 17.4 ML/DL
P AXIS: 64 DEGREES
PCO2 BLDV: 75.4 MM HG (ref 42–50)
PCO2 BLDV: 76.7 MM HG (ref 42–50)
PH BLDV: 7.27 [PH] (ref 7.3–7.4)
PH BLDV: 7.29 [PH] (ref 7.3–7.4)
PH UR STRIP.AUTO: 6 [PH]
PLATELET # BLD AUTO: 266 THOUSANDS/UL (ref 149–390)
PLATELET BLD QL SMEAR: ADEQUATE
PLATELET CLUMP BLD QL SMEAR: PRESENT
PMV BLD AUTO: 9.3 FL (ref 8.9–12.7)
PO2 BLDV: 162.7 MM HG (ref 35–45)
PO2 BLDV: 64.1 MM HG (ref 35–45)
POIKILOCYTOSIS BLD QL SMEAR: PRESENT
POLYCHROMASIA BLD QL SMEAR: PRESENT
POTASSIUM SERPL-SCNC: 4.6 MMOL/L (ref 3.5–5.3)
PR INTERVAL: 156 MS
PROCALCITONIN SERPL-MCNC: <0.05 NG/ML
PROT SERPL-MCNC: 8.3 G/DL (ref 6.4–8.4)
PROT UR STRIP-MCNC: NEGATIVE MG/DL
QRS AXIS: 70 DEGREES
QRSD INTERVAL: 86 MS
QT INTERVAL: 364 MS
QTC INTERVAL: 406 MS
RBC # BLD AUTO: 3.96 MILLION/UL (ref 3.88–5.62)
RBC MORPH BLD: PRESENT
SARS-COV+SARS-COV-2 AG RESP QL IA.RAPID: NEGATIVE
SODIUM SERPL-SCNC: 139 MMOL/L (ref 135–147)
SP GR UR STRIP.AUTO: >=1.03 (ref 1–1.03)
T WAVE AXIS: 47 DEGREES
UROBILINOGEN UR QL STRIP.AUTO: 0.2 E.U./DL
VENTRICULAR RATE: 75 BPM
WBC # BLD AUTO: 7.91 THOUSAND/UL (ref 4.31–10.16)

## 2025-02-13 PROCEDURE — 93010 ELECTROCARDIOGRAM REPORT: CPT | Performed by: INTERNAL MEDICINE

## 2025-02-13 PROCEDURE — 43762 RPLC GTUBE NO REVJ TRC: CPT | Performed by: EMERGENCY MEDICINE

## 2025-02-13 PROCEDURE — 99285 EMERGENCY DEPT VISIT HI MDM: CPT | Performed by: EMERGENCY MEDICINE

## 2025-02-13 PROCEDURE — 85007 BL SMEAR W/DIFF WBC COUNT: CPT | Performed by: EMERGENCY MEDICINE

## 2025-02-13 PROCEDURE — 36415 COLL VENOUS BLD VENIPUNCTURE: CPT | Performed by: EMERGENCY MEDICINE

## 2025-02-13 PROCEDURE — 93005 ELECTROCARDIOGRAM TRACING: CPT

## 2025-02-13 PROCEDURE — 82805 BLOOD GASES W/O2 SATURATION: CPT | Performed by: EMERGENCY MEDICINE

## 2025-02-13 PROCEDURE — 84145 PROCALCITONIN (PCT): CPT | Performed by: EMERGENCY MEDICINE

## 2025-02-13 PROCEDURE — 87804 INFLUENZA ASSAY W/OPTIC: CPT | Performed by: EMERGENCY MEDICINE

## 2025-02-13 PROCEDURE — 83605 ASSAY OF LACTIC ACID: CPT | Performed by: EMERGENCY MEDICINE

## 2025-02-13 PROCEDURE — 80053 COMPREHEN METABOLIC PANEL: CPT | Performed by: EMERGENCY MEDICINE

## 2025-02-13 PROCEDURE — 83690 ASSAY OF LIPASE: CPT | Performed by: EMERGENCY MEDICINE

## 2025-02-13 PROCEDURE — 87811 SARS-COV-2 COVID19 W/OPTIC: CPT | Performed by: EMERGENCY MEDICINE

## 2025-02-13 PROCEDURE — 99284 EMERGENCY DEPT VISIT MOD MDM: CPT

## 2025-02-13 PROCEDURE — 74018 RADEX ABDOMEN 1 VIEW: CPT

## 2025-02-13 PROCEDURE — 81003 URINALYSIS AUTO W/O SCOPE: CPT | Performed by: EMERGENCY MEDICINE

## 2025-02-13 PROCEDURE — 85027 COMPLETE CBC AUTOMATED: CPT | Performed by: EMERGENCY MEDICINE

## 2025-02-13 PROCEDURE — 71045 X-RAY EXAM CHEST 1 VIEW: CPT

## 2025-02-13 RX ORDER — OLANZAPINE 10 MG/2ML
10 INJECTION, POWDER, FOR SOLUTION INTRAMUSCULAR ONCE
Status: COMPLETED | OUTPATIENT
Start: 2025-02-14 | End: 2025-02-14

## 2025-02-13 RX ORDER — WATER 10 ML/10ML
INJECTION INTRAMUSCULAR; INTRAVENOUS; SUBCUTANEOUS
Status: COMPLETED
Start: 2025-02-13 | End: 2025-02-14

## 2025-02-14 ENCOUNTER — HOSPITAL ENCOUNTER (INPATIENT)
Facility: HOSPITAL | Age: 40
LOS: 1 days | Discharge: HOME HEALTH | DRG: 133 | End: 2025-02-15
Attending: INTERNAL MEDICINE | Admitting: INTERNAL MEDICINE
Payer: COMMERCIAL

## 2025-02-14 VITALS
RESPIRATION RATE: 18 BRPM | HEART RATE: 70 BPM | SYSTOLIC BLOOD PRESSURE: 137 MMHG | OXYGEN SATURATION: 98 % | DIASTOLIC BLOOD PRESSURE: 81 MMHG | TEMPERATURE: 97.6 F

## 2025-02-14 DIAGNOSIS — F39 MOOD DISORDER (HCC): ICD-10-CM

## 2025-02-14 DIAGNOSIS — J96.22 ACUTE ON CHRONIC RESPIRATORY FAILURE WITH HYPOXIA AND HYPERCAPNIA (HCC): ICD-10-CM

## 2025-02-14 DIAGNOSIS — Z99.11 VENTILATOR DEPENDENT (HCC): Chronic | ICD-10-CM

## 2025-02-14 DIAGNOSIS — J96.21 ACUTE ON CHRONIC RESPIRATORY FAILURE WITH HYPOXIA AND HYPERCAPNIA (HCC): ICD-10-CM

## 2025-02-14 DIAGNOSIS — F41.9 ANXIETY: Primary | ICD-10-CM

## 2025-02-14 DIAGNOSIS — F32.A DEPRESSION, UNSPECIFIED DEPRESSION TYPE: ICD-10-CM

## 2025-02-14 LAB
ANION GAP SERPL CALCULATED.3IONS-SCNC: 16 MMOL/L (ref 4–13)
ARTERIAL PATENCY WRIST A: NO
BASE EX.OXY STD BLDV CALC-SCNC: 93.8 % (ref 60–80)
BASE EX.OXY STD BLDV CALC-SCNC: 96.5 % (ref 60–80)
BASE EXCESS BLDV CALC-SCNC: 5.7 MMOL/L
BASE EXCESS BLDV CALC-SCNC: 6.9 MMOL/L
BASOPHILS # BLD AUTO: 0.03 THOUSANDS/ΜL (ref 0–0.1)
BASOPHILS NFR BLD AUTO: 1 % (ref 0–1)
BODY TEMPERATURE: 96 DEGREES FEHRENHEIT
BUN SERPL-MCNC: 15 MG/DL (ref 5–25)
CALCIUM SERPL-MCNC: 9.4 MG/DL (ref 8.4–10.2)
CHLORIDE SERPL-SCNC: 96 MMOL/L (ref 96–108)
CO2 SERPL-SCNC: 27 MMOL/L (ref 21–32)
CREAT SERPL-MCNC: 0.32 MG/DL (ref 0.6–1.3)
EOSINOPHIL # BLD AUTO: 0.02 THOUSAND/ΜL (ref 0–0.61)
EOSINOPHIL NFR BLD AUTO: 0 % (ref 0–6)
ERYTHROCYTE [DISTWIDTH] IN BLOOD BY AUTOMATED COUNT: 14.7 % (ref 11.6–15.1)
GFR SERPL CREATININE-BSD FRML MDRD: 166 ML/MIN/1.73SQ M
GLUCOSE SERPL-MCNC: 94 MG/DL (ref 65–140)
HCO3 BLDV-SCNC: 28.9 MMOL/L (ref 24–30)
HCO3 BLDV-SCNC: 32.2 MMOL/L (ref 24–30)
HCT VFR BLD AUTO: 31 % (ref 36.5–49.3)
HGB BLD-MCNC: 9.8 G/DL (ref 12–17)
HOROWITZ INDEX BLDA+IHG-RTO: 40 MM[HG]
IMM GRANULOCYTES # BLD AUTO: 0.06 THOUSAND/UL (ref 0–0.2)
IMM GRANULOCYTES NFR BLD AUTO: 1 % (ref 0–2)
LYMPHOCYTES # BLD AUTO: 1.28 THOUSANDS/ΜL (ref 0.6–4.47)
LYMPHOCYTES NFR BLD AUTO: 27 % (ref 14–44)
MAGNESIUM SERPL-MCNC: 1.9 MG/DL (ref 1.9–2.7)
MCH RBC QN AUTO: 29.2 PG (ref 26.8–34.3)
MCHC RBC AUTO-ENTMCNC: 31.6 G/DL (ref 31.4–37.4)
MCV RBC AUTO: 92 FL (ref 82–98)
MONOCYTES # BLD AUTO: 0.46 THOUSAND/ΜL (ref 0.17–1.22)
MONOCYTES NFR BLD AUTO: 10 % (ref 4–12)
NEUTROPHILS # BLD AUTO: 2.85 THOUSANDS/ΜL (ref 1.85–7.62)
NEUTS SEG NFR BLD AUTO: 61 % (ref 43–75)
NRBC BLD AUTO-RTO: 0 /100 WBCS
O2 CT BLDV-SCNC: 14.1 ML/DL
O2 CT BLDV-SCNC: 16.3 ML/DL
PCO2 BLDV: 31.8 MM HG (ref 42–50)
PCO2 BLDV: 56 MM HG (ref 42–50)
PEEP RESPIRATORY: 8 CM[H2O]
PH BLDV: 7.38 [PH] (ref 7.3–7.4)
PH BLDV: 7.58 [PH] (ref 7.3–7.4)
PHOSPHATE SERPL-MCNC: 1.8 MG/DL (ref 2.7–4.5)
PLATELET # BLD AUTO: 207 THOUSANDS/UL (ref 149–390)
PMV BLD AUTO: 9.6 FL (ref 8.9–12.7)
PO2 BLDV: 118.3 MM HG (ref 35–45)
PO2 BLDV: 61.6 MM HG (ref 35–45)
POTASSIUM SERPL-SCNC: 2.8 MMOL/L (ref 3.5–5.3)
PROCALCITONIN SERPL-MCNC: <0.05 NG/ML
RBC # BLD AUTO: 3.36 MILLION/UL (ref 3.88–5.62)
SODIUM SERPL-SCNC: 139 MMOL/L (ref 135–147)
VENT AC: 18
VENT- AC: AC
VT SETTING VENT: 500 ML
WBC # BLD AUTO: 4.7 THOUSAND/UL (ref 4.31–10.16)

## 2025-02-14 PROCEDURE — 94150 VITAL CAPACITY TEST: CPT

## 2025-02-14 PROCEDURE — 94664 DEMO&/EVAL PT USE INHALER: CPT

## 2025-02-14 PROCEDURE — 94760 N-INVAS EAR/PLS OXIMETRY 1: CPT

## 2025-02-14 PROCEDURE — 80048 BASIC METABOLIC PNL TOTAL CA: CPT

## 2025-02-14 PROCEDURE — 99291 CRITICAL CARE FIRST HOUR: CPT | Performed by: INTERNAL MEDICINE

## 2025-02-14 PROCEDURE — 94640 AIRWAY INHALATION TREATMENT: CPT

## 2025-02-14 PROCEDURE — 82805 BLOOD GASES W/O2 SATURATION: CPT | Performed by: EMERGENCY MEDICINE

## 2025-02-14 PROCEDURE — NC001 PR NO CHARGE: Performed by: NURSE PRACTITIONER

## 2025-02-14 PROCEDURE — 94003 VENT MGMT INPAT SUBQ DAY: CPT

## 2025-02-14 PROCEDURE — 5A1945Z RESPIRATORY VENTILATION, 24-96 CONSECUTIVE HOURS: ICD-10-PCS | Performed by: INTERNAL MEDICINE

## 2025-02-14 PROCEDURE — 96372 THER/PROPH/DIAG INJ SC/IM: CPT

## 2025-02-14 PROCEDURE — 83735 ASSAY OF MAGNESIUM: CPT

## 2025-02-14 PROCEDURE — 85025 COMPLETE CBC W/AUTO DIFF WBC: CPT

## 2025-02-14 PROCEDURE — 87081 CULTURE SCREEN ONLY: CPT

## 2025-02-14 PROCEDURE — 84145 PROCALCITONIN (PCT): CPT

## 2025-02-14 PROCEDURE — 96374 THER/PROPH/DIAG INJ IV PUSH: CPT

## 2025-02-14 PROCEDURE — 36415 COLL VENOUS BLD VENIPUNCTURE: CPT | Performed by: EMERGENCY MEDICINE

## 2025-02-14 PROCEDURE — 84100 ASSAY OF PHOSPHORUS: CPT

## 2025-02-14 PROCEDURE — 82805 BLOOD GASES W/O2 SATURATION: CPT

## 2025-02-14 PROCEDURE — 94002 VENT MGMT INPAT INIT DAY: CPT

## 2025-02-14 PROCEDURE — 99255 IP/OBS CONSLTJ NEW/EST HI 80: CPT | Performed by: PSYCHIATRY & NEUROLOGY

## 2025-02-14 RX ORDER — QUETIAPINE FUMARATE 25 MG/1
25 TABLET, FILM COATED ORAL 2 TIMES DAILY PRN
Status: DISCONTINUED | OUTPATIENT
Start: 2025-02-14 | End: 2025-02-15 | Stop reason: HOSPADM

## 2025-02-14 RX ORDER — QUETIAPINE FUMARATE 100 MG/1
200 TABLET, FILM COATED ORAL
Status: DISCONTINUED | OUTPATIENT
Start: 2025-02-14 | End: 2025-02-15 | Stop reason: HOSPADM

## 2025-02-14 RX ORDER — CHLORHEXIDINE GLUCONATE ORAL RINSE 1.2 MG/ML
15 SOLUTION DENTAL EVERY 12 HOURS SCHEDULED
Status: DISCONTINUED | OUTPATIENT
Start: 2025-02-14 | End: 2025-02-14

## 2025-02-14 RX ORDER — QUETIAPINE FUMARATE 25 MG/1
50 TABLET, FILM COATED ORAL 2 TIMES DAILY PRN
Status: DISCONTINUED | OUTPATIENT
Start: 2025-02-14 | End: 2025-02-14

## 2025-02-14 RX ORDER — ACETYLCYSTEINE 200 MG/ML
3 SOLUTION ORAL; RESPIRATORY (INHALATION)
Status: DISCONTINUED | OUTPATIENT
Start: 2025-02-14 | End: 2025-02-15 | Stop reason: HOSPADM

## 2025-02-14 RX ORDER — CHLORHEXIDINE GLUCONATE ORAL RINSE 1.2 MG/ML
15 SOLUTION DENTAL EVERY 12 HOURS SCHEDULED
Status: DISCONTINUED | OUTPATIENT
Start: 2025-02-14 | End: 2025-02-15 | Stop reason: HOSPADM

## 2025-02-14 RX ORDER — ACETAMINOPHEN 160 MG/5ML
650 SUSPENSION ORAL EVERY 6 HOURS PRN
Status: DISCONTINUED | OUTPATIENT
Start: 2025-02-14 | End: 2025-02-15 | Stop reason: HOSPADM

## 2025-02-14 RX ORDER — LORAZEPAM 2 MG/ML
2 INJECTION INTRAMUSCULAR ONCE
Status: COMPLETED | OUTPATIENT
Start: 2025-02-14 | End: 2025-02-14

## 2025-02-14 RX ORDER — LORAZEPAM 2 MG/ML
0.5 INJECTION INTRAMUSCULAR EVERY 4 HOURS PRN
Status: DISCONTINUED | OUTPATIENT
Start: 2025-02-14 | End: 2025-02-14

## 2025-02-14 RX ORDER — ACETAMINOPHEN 160 MG/5ML
650 SUSPENSION ORAL EVERY 4 HOURS PRN
Status: DISCONTINUED | OUTPATIENT
Start: 2025-02-14 | End: 2025-02-14

## 2025-02-14 RX ORDER — POTASSIUM CHLORIDE 20MEQ/15ML
40 LIQUID (ML) ORAL ONCE
Status: COMPLETED | OUTPATIENT
Start: 2025-02-14 | End: 2025-02-14

## 2025-02-14 RX ORDER — LORAZEPAM 2 MG/ML
1 INJECTION INTRAMUSCULAR EVERY 4 HOURS PRN
Status: DISCONTINUED | OUTPATIENT
Start: 2025-02-14 | End: 2025-02-15

## 2025-02-14 RX ORDER — POTASSIUM CHLORIDE 14.9 MG/ML
20 INJECTION INTRAVENOUS ONCE
Status: DISCONTINUED | OUTPATIENT
Start: 2025-02-14 | End: 2025-02-14

## 2025-02-14 RX ORDER — IPRATROPIUM BROMIDE AND ALBUTEROL SULFATE 2.5; .5 MG/3ML; MG/3ML
3 SOLUTION RESPIRATORY (INHALATION)
Status: DISCONTINUED | OUTPATIENT
Start: 2025-02-14 | End: 2025-02-15 | Stop reason: HOSPADM

## 2025-02-14 RX ADMIN — CHLORHEXIDINE GLUCONATE 15 ML: 1.2 SOLUTION ORAL at 21:29

## 2025-02-14 RX ADMIN — POTASSIUM CHLORIDE 40 MEQ: 20 SOLUTION ORAL at 06:54

## 2025-02-14 RX ADMIN — APIXABAN 5 MG: 5 TABLET, FILM COATED ORAL at 17:34

## 2025-02-14 RX ADMIN — ACETYLCYSTEINE 600 MG: 200 SOLUTION ORAL; RESPIRATORY (INHALATION) at 02:21

## 2025-02-14 RX ADMIN — IPRATROPIUM BROMIDE AND ALBUTEROL SULFATE 3 ML: .5; 3 SOLUTION RESPIRATORY (INHALATION) at 02:21

## 2025-02-14 RX ADMIN — WATER 10 ML: 1 INJECTION INTRAMUSCULAR; INTRAVENOUS; SUBCUTANEOUS at 00:00

## 2025-02-14 RX ADMIN — LORAZEPAM 2 MG: 2 INJECTION INTRAMUSCULAR; INTRAVENOUS at 01:23

## 2025-02-14 RX ADMIN — QUETIAPINE FUMARATE 200 MG: 100 TABLET ORAL at 21:29

## 2025-02-14 RX ADMIN — APIXABAN 5 MG: 5 TABLET, FILM COATED ORAL at 08:23

## 2025-02-14 RX ADMIN — CHLORHEXIDINE GLUCONATE 15 ML: 1.2 SOLUTION ORAL at 02:19

## 2025-02-14 RX ADMIN — IPRATROPIUM BROMIDE AND ALBUTEROL SULFATE 3 ML: .5; 3 SOLUTION RESPIRATORY (INHALATION) at 13:08

## 2025-02-14 RX ADMIN — POTASSIUM PHOSPHATE, MONOBASIC AND POTASSIUM PHOSPHATE, DIBASIC 21 MMOL: 224; 236 INJECTION, SOLUTION, CONCENTRATE INTRAVENOUS at 07:02

## 2025-02-14 RX ADMIN — OLANZAPINE 10 MG: 10 INJECTION, POWDER, FOR SOLUTION INTRAMUSCULAR at 00:00

## 2025-02-14 RX ADMIN — ACETYLCYSTEINE 600 MG: 200 SOLUTION ORAL; RESPIRATORY (INHALATION) at 13:08

## 2025-02-14 RX ADMIN — ACETYLCYSTEINE 600 MG: 200 SOLUTION ORAL; RESPIRATORY (INHALATION) at 19:24

## 2025-02-14 RX ADMIN — LORAZEPAM 1 MG: 2 INJECTION INTRAMUSCULAR; INTRAVENOUS at 17:51

## 2025-02-14 RX ADMIN — IPRATROPIUM BROMIDE AND ALBUTEROL SULFATE 3 ML: .5; 3 SOLUTION RESPIRATORY (INHALATION) at 19:24

## 2025-02-14 RX ADMIN — QUETIAPINE FUMARATE 150 MG: 100 TABLET ORAL at 02:19

## 2025-02-14 RX ADMIN — CHLORHEXIDINE GLUCONATE 15 ML: 1.2 SOLUTION ORAL at 08:27

## 2025-02-14 RX ADMIN — IPRATROPIUM BROMIDE AND ALBUTEROL SULFATE 3 ML: .5; 3 SOLUTION RESPIRATORY (INHALATION) at 07:37

## 2025-02-14 RX ADMIN — ACETYLCYSTEINE 600 MG: 200 SOLUTION ORAL; RESPIRATORY (INHALATION) at 07:37

## 2025-02-14 NOTE — PROGRESS NOTES
Nutrition Recommendation:   When medically feasible initiate TF via PEG Jevity 1.5, at 20 ml/hr, increase 10-15ml q4-8hrs to goal rate of 65ml/hr. Flush with 200ml H2O q4 hrs to provide ~ 1560ml TV, 2340 kcal, 100g protein, 2386 ml total free water.

## 2025-02-14 NOTE — ASSESSMENT & PLAN NOTE
"Status post trach for neuromuscular disease  Vent dependent at baseline  VBG obtained at [facility] revealing 7.29/77/64/36  Repeat VBG was obtained without any improvement in hypercapnia  Patient was transitioned from home ventilator to hospital ventilator with reported \" leak\" in home ventilator  Improvement in hypercapnia on same home settings with hospital equipment    Plan:  Continue vent support  Case management consult in a.m. for home equipment  VAT bundle  "

## 2025-02-14 NOTE — ED NOTES
2/14/25 @ 1509:  HECTOR notified Kortney from [facility] that screening was requested by Dr. Craft due to suspected suicide attempt by patient trying to remove himself from his vent.  Also, patient has been agitated.  Roderick MS

## 2025-02-14 NOTE — H&P
"H&P - Critical Care/ICU   Name: Hemanth Swanson 39 y.o. male I MRN: [id number]  Unit/Bed#: ICU 04 I Date of Admission: (Not on file)   Date of Service: 2/14/2025 I Hospital Day: 0       Assessment & Plan  Acute on chronic respiratory failure with hypoxia and hypercapnia (HCC)  Status post trach for neuromuscular disease  Vent dependent at baseline  VBG obtained at [facility] revealing 7.29/77/64/36  Repeat VBG was obtained without any improvement in hypercapnia  Patient was transitioned from home ventilator to hospital ventilator with reported \" leak\" in home ventilator  Improvement in hypercapnia on same home settings with hospital equipment    Plan:  Continue vent support  Case management consult in a.m. for home equipment  VAT bundle  Agitation  History of agitation  Outpatient regimen consist of Seroquel 150 mg at bedtime  Family reports increased agitation past week with patient's of removing PEG tube x 2  SLE expressed concerns of suicide as patient continually tried to self remove himself from ventilator with increasing agitation and concerns for self-harm  Continue Seroquel  Psych consult  S/P percutaneous endoscopic gastrostomy (PEG) tube placement (HCC)  Patient self removed on 2/9 and PEG tube was replaced  Patient again self removed on 2/13 and PEG tube was replaced  History of pulmonary embolus (PE)  Continue Eliquis  Mixed axonal-demyelinating polyneuropathy  Follows outpatient with neurology  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HCC)  S/p resection and chemo    Disposition: Critical care    History of Present Illness   Hemanth Swanson is a 39 y.o. who presents with agitation and self dislodgment of PEG tube.  Patient has a past medical history of mixed axonal demyelinating polyneuropathy, trach/vent dependent, PEG tube in place, and history of PE on Eliquis.     Upon arrival to [facility], family expressed concerns of increased agitation and concerns of sepsis.  PEG tube was replaced.  Lab work was largely unremarkable except " "for VBG.  Patient was found to be hypercapnic on home ventilator.  He was switched over to hospital ventilator on same home settings with improvement in CO2.    Patient also became increasingly agitated at SLE with self removing himself from the ventilator.  He required Zyprexa and Ativan and placement of restraints.    Patient transferred to [facility] for ventilator management.     History obtained from chart review.  Review of Systems: Review of Systems not obtainable due to Uncooperative patient    Historical Information   Past Medical History:  No date: Anxiety  No date: Cancer (HCC)  No date: Depression  10/06/2024: Hypernatremia  09/24/2023: Migration of percutaneous endoscopic gastrostomy (PEG)   tube (HCC)  No date: Psychiatric disorder      Comment:  bipolar  08/24/2023: Sepsis (HCC) Past Surgical History:  01/20/2023: IR BIOPSY LYMPH NODE  6/18/2024: IR GASTROSTOMY (G) TUBE CHECK/CHANGE/REINSERTION/UPSIZE  09/25/2023: IR GASTROSTOMY TUBE PLACEMENT  09/06/2023: IR LUMBAR PUNCTURE  10/25/2024: IR LUMBAR PUNCTURE  03/14/2023: IR PORT PLACEMENT  12/14/2022: ORCHIECTOMY; Left      Comment:  Procedure: ORCHIECTOMY INGUINAL;  Surgeon: Flip Michael MD;  Location:  MAIN OR;  Service: Urology  09/08/2023: PEG W/TRACHEOSTOMY PLACEMENT; N/A      Comment:  Procedure: TRACHEOSTOMY WITH INSERTION PEG TUBE;                 Surgeon: Rudy Mcmanus MD;  Location: [facility];                 Service: General  No date: TESTICLE SURGERY   Current Outpatient Medications   Medication Instructions    acetylcysteine (MUCOMYST) 600 mg, Nebulization, Every 6 hours    apixaban (ELIQUIS) 5 mg, Per PEG Tube, 2 times daily    Incontinence Supply Disposable (Comfort Shield Adult Diapers) MISC 1 each, Does not apply, 4 times daily    ipratropium-albuterol (DUO-NEB) 0.5-2.5 mg/3 mL nebulizer solution 3 mL, Nebulization, Every 6 hours (RESP)    NEEDLE, DISP, 18 G 18G X 1\" MISC Does not apply, 2 times weekly    Oral " "Hygiene Products (Toothette Swabs/Dentifrice) SWAB Mouth/Throat, 3 times daily    QUEtiapine Fumarate 150 mg, Oral, Daily at bedtime    Syringe/Needle, Disp, (Syringe Luer Slip) 25G X 5/8\" 1 ML MISC Does not apply, 2 times weekly    Allergies   Allergen Reactions    Dog Epithelium Cough, Sneezing and Nasal Congestion      Social History     Tobacco Use    Smoking status: Former     Current packs/day: 0.00     Average packs/day: 0.7 packs/day for 22.7 years (15.0 ttl pk-yrs)     Types: Cigarettes     Start date: 2008     Quit date: 2023     Years since quittin.7     Passive exposure: Never    Smokeless tobacco: Never   Vaping Use    Vaping status: Former    Start date: 2018    Quit date: 2023    Substances: Nicotine, THC   Substance Use Topics    Alcohol use: Not Currently     Comment: 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 Not currently using crack cocaine.    Family History   Problem Relation Age of Onset    Coronary artery disease Maternal Aunt     Cancer Father     Diabetes Father     Hypertension Father           Objective :                   Vitals I/O      Most Recent Min/Max in 24hrs   Temp   Temp  Min: 97.6 °F (36.4 °C)  Max: 97.6 °F (36.4 °C)   Pulse   Pulse  Min: 77  Max: 84   Resp   Resp  Min: 20  Max: 20   BP   BP  Min: 141/101  Max: 142/95   O2 Sat   SpO2  Min: 97 %  Max: 99 %    No intake or output data in the 24 hours ending 25 0102    No diet orders on file    Invasive Monitoring           Physical Exam   Physical Exam  Vitals reviewed.   Eyes:      Pupils: Pupils are equal, round, and reactive to light.   Skin:     General: Skin is warm and dry.   Cardiovascular:      Rate and Rhythm: Normal rate and regular rhythm.      Pulses: Normal pulses.   Musculoskeletal:         General: Normal range of motion.      Right lower leg: No edema.      Left lower leg: No edema. "   Abdominal: General: There is abdominal type feeding tube.     Palpations: Abdomen is soft.      Hernia: A hernia is present.   Constitutional:       Appearance: He is ill-appearing.      Interventions: He is restrained.   Pulmonary:      Effort: Pulmonary effort is normal. No accessory muscle usage, respiratory distress or accessory muscle usage.   Neurological:      Motor: gross motor function is at baseline for patient.          Diagnostic Studies        Lab Results: I have reviewed the following results:     Medications:  Scheduled PRN        Continuous    No current facility-administered medications for this encounter.       Labs:   CBC    Recent Labs     02/13/25 2103   WBC 7.91   HGB 11.5*   HCT 38.3        BMP    Recent Labs     02/13/25 2103   SODIUM 139   K 4.6   CL 94*   CO2 41*   AGAP 4   BUN 16   CREATININE 0.41*   CALCIUM 10.2       Coags    No recent results     Additional Electrolytes  No recent results       Blood Gas    No recent results  Recent Labs     02/14/25  0047   PHVEN 7.377   BDM9CLL 56.0*   PO2VEN 118.3*   JKZ2FYP 32.2*   BEVEN 5.7   D1VACNF 96.5*    LFTs  Recent Labs     02/13/25  2103   ALT 21   AST 14   ALKPHOS 100   ALB 4.3   TBILI 0.38       Infectious  Recent Labs     02/13/25 2103   PROCALCITONI <0.05     Glucose  Recent Labs     02/13/25  2103   GLUC 85        Administrative Statements

## 2025-02-14 NOTE — EMTALA/ACUTE CARE TRANSFER
[facility] EMERGENCY DEPARTMENT  [address] [place] [id number]  Dept: [phone]      EMTALA TRANSFER CONSENT    NAME Hemanth Swanson                                         1985                              MRN [id number]    I have been informed of my rights regarding examination, treatment, and transfer   by Dr. Bashir Benavidez MD    Benefits: Specialized equipment and/or services available at the receiving facility (Include comment)________________________ (critical care)    Risks: Potential for delay in receiving treatment, Potential deterioration of medical condition, Loss of IV      Consent for Transfer:  I acknowledge that my medical condition has been evaluated and explained to me by the emergency department physician or other qualified medical person and/or my attending physician, who has recommended that I be transferred to the service of  Accepting Physician: Dr. Neal at Accepting Facility Name, City & State : [facility]. The above potential benefits of such transfer, the potential risks associated with such transfer, and the probable risks of not being transferred have been explained to me, and I fully understand them.  The doctor has explained that, in my case, the benefits of transfer outweigh the risks.  I agree to be transferred.    I authorize the performance of emergency medical procedures and treatments upon me in both transit and upon arrival at the receiving facility.  Additionally, I authorize the release of any and all medical records to the receiving facility and request they be transported with me, if possible.  I understand that the safest mode of transportation during a medical emergency is an ambulance and that the Hospital advocates the use of this mode of transport. Risks of traveling to the receiving facility by car, including absence of medical control, life sustaining equipment, such as oxygen, and medical personnel has been explained to  me and I fully understand them.    (RILEY CORRECT BOX BELOW)  [  ]  I consent to the stated transfer and to be transported by ambulance/helicopter.  [  ]  I consent to the stated transfer, but refuse transportation by ambulance and accept full responsibility for my transportation by car.  I understand the risks of non-ambulance transfers and I exonerate the Hospital and its staff from any deterioration in my condition that results from this refusal.    X___________________________________________    DATE  25  TIME________  Signature of patient or legally responsible individual signing on patient behalf           RELATIONSHIP TO PATIENT_________________________          Provider Certification    NAME Hemanth Swanson                                         1985                              MRN [id number]    A medical screening exam was performed on the above named patient.  Based on the examination:    Condition Necessitating Transfer The primary encounter diagnosis was Acute on chronic respiratory failure with hypercapnia (HCC). Diagnoses of PEG (percutaneous endoscopic gastrostomy) adjustment/replacement/removal (HCC) and Suicide attempt (HCC) were also pertinent to this visit.    Patient Condition: The patient has been stabilized such that within reasonable medical probability, no material deterioration of the patient condition or the condition of the unborn child(capri) is likely to result from the transfer    Reason for Transfer: Level of Care needed not available at this facility    Transfer Requirements: Facility [facility]   Space available and qualified personnel available for treatment as acknowledged by    Agreed to accept transfer and to provide appropriate medical treatment as acknowledged by       Dr. Neal  Appropriate medical records of the examination and treatment of the patient are provided at the time of transfer   STAFF INITIAL WHEN COMPLETED _______  Transfer will be performed  by qualified personnel from    and appropriate transfer equipment as required, including the use of necessary and appropriate life support measures.    Provider Certification: I have examined the patient and explained the following risks and benefits of being transferred/refusing transfer to the patient/family:  Unanticipated needs of medical equipment and personnel during transport, General risk, such as traffic hazards, adverse weather conditions, rough terrain or turbulence, possible failure of equipment (including vehicle or aircraft), or consequences of actions of persons outside the control of the transport personnel, Risk of worsening condition, The possibility of a transport vehicle being unavailable      Based on these reasonable risks and benefits to the patient and/or the unborn child(capri), and based upon the information available at the time of the patient’s examination, I certify that the medical benefits reasonably to be expected from the provision of appropriate medical treatments at another medical facility outweigh the increasing risks, if any, to the individual’s medical condition, and in the case of labor to the unborn child, from effecting the transfer.    X____________________________________________ DATE 02/14/25        TIME_______      ORIGINAL - SEND TO MEDICAL RECORDS   COPY - SEND WITH PATIENT DURING TRANSFER

## 2025-02-14 NOTE — ED NOTES
Pickup at 0130 going to [facility] ICU 4. Dr. Neal accepting. Number for report is [phone]      Cynthia Holman RN  02/14/25 0131

## 2025-02-14 NOTE — ASSESSMENT & PLAN NOTE
History of agitation  Outpatient regimen consist of Seroquel 150 mg at bedtime  Family reports increased agitation past week with patient's of removing PEG tube x 2  SLE expressed concerns of suicide as patient continually tried to self remove himself from ventilator with increasing agitation and concerns for self-harm  Continue Seroquel  Psych consult

## 2025-02-14 NOTE — ASSESSMENT & PLAN NOTE
Patient with history of seminoma of L testis, PEG tube placement, Mixed axonal-demyelinating polyneuropathy, history of PE, agitation, depression, anxiety, vent dependent, presented with acute on chronic respiratory failure with hypoxia and hypercapnia. Psychiatric consultation is requested due to agitation and concerns of suicide - per documentation suicide ideation suspected in the ED as patient removed himself from vent multiple times.     I saw the patient previously for psychiatric follow-up on 12/26/2025 after initial psychiatric consultation that admission and increased Seroquel dosage was considered at that time.  Patient was discharged on Seroquel 100 mg daily and 150 mg nightly, subsequently the daytime dosage was discontinued by family due to sedation-see below and previous chart documentation for more information.  Patient did not respond verbally nor communicate otherwise.  He did not provide meaningful responses to whether he wishes he were dead, wants to kill himself, if he was trying to kill himself by removing the vent.  He exhibited agitation, pulling against restraint, attempting to pull at pad underneath him.  Per nurse patient was hitting against the bed. Due to documented concerns for suspected suicide attempt and patient not communicating when asking about such and about current suicidality, as well as visualized agitation, I recommend patient be screened by [facility] for determination of inpatient psychiatric hospitalization vs clearance from psychiatric perspective for discharge.      Continue medical management  Continue observation  I recommend patient be screened by [facility] for determination of inpatient psychiatric hospitalization vs clearance from psychiatric perspective for discharge  Increase Seroquel to 200 mg QHS  Can utilize Seroquel PRN agitation  Discussed with the primary team  Discussed with the crisis worker  For after hours and weekends  please contact [facility] Psychiatry Consultation role   Psychiatry to follow up as necessary/based off results of screening  Please contact with questions and updates

## 2025-02-14 NOTE — ED NOTES
Respiratory at bedside switching patient from home ventilator to hospital ventilator.          Tresa Healy  02/13/25 0247

## 2025-02-14 NOTE — CONSULTS
Consultation - Behavioral Health   Hemanth Swanson 39 y.o. male MRN: [id number]  Unit/Bed#: ICU 04 Encounter: [id number]              Assessment & Plan  Acute on chronic respiratory failure with hypoxia and hypercapnia (HCC)  Management per primary team  Mixed axonal-demyelinating polyneuropathy  Management per primary team  Mood disorder (HCC)  Patient with history of seminoma of L testis, PEG tube placement, Mixed axonal-demyelinating polyneuropathy, history of PE, agitation, depression, anxiety, vent dependent, presented with acute on chronic respiratory failure with hypoxia and hypercapnia. Psychiatric consultation is requested due to agitation and concerns of suicide - per documentation suicide ideation suspected in the ED as patient removed himself from vent multiple times.     I saw the patient previously for psychiatric follow-up on 12/26/2025 after initial psychiatric consultation that admission and increased Seroquel dosage was considered at that time.  Patient was discharged on Seroquel 100 mg daily and 150 mg nightly, subsequently the daytime dosage was discontinued by family due to sedation-see below and previous chart documentation for more information.  Patient did not respond verbally nor communicate otherwise.  He did not provide meaningful responses to whether he wishes he were dead, wants to kill himself, if he was trying to kill himself by removing the vent.  He exhibited agitation, pulling against restraint, attempting to pull at pad underneath him.  Per nurse patient was hitting against the bed. Due to documented concerns for suspected suicide attempt and patient not communicating when asking about such and about current suicidality, as well as visualized agitation, I recommend patient be screened by [place] [facility] for determination of inpatient psychiatric hospitalization vs clearance from psychiatric perspective for discharge.      Continue medical management  Continue  observation  I recommend patient be screened by [place] [facility] for determination of inpatient psychiatric hospitalization vs clearance from psychiatric perspective for discharge  Increase Seroquel to 200 mg QHS  Can utilize Seroquel PRN agitation  Discussed with the primary team  Discussed with the crisis worker  For after hours and weekends please contact [facility] Psychiatry Consultation role   Psychiatry to follow up as necessary/based off results of screening  Please contact with questions and updates      Risks, benefits and possible side effects of Medications:   Unable to have discussion due to patient factors        Chief Complaint: Does not answer      History of Present Illness   Inpatient consult to Psychiatry  Consult performed by: Bashir Craft DO  Consult ordered by: AMBER Mei        Physician Requesting Consult: Saad Neal MD  Reason for Consult / Principal Problem: Dx: 1. Anxiety 2. Depression, unspecified depression type 3. Mood disorder (HCC)    Hemanth Swanson is a 39 y.o. male with past medical history of seminoma of L testis, PEG tube placement, Mixed axonal-demyelinating polyneuropathy, history of PE, agitation, depression, anxiety, vent dependent, presented with acute on chronic respiratory failure with hypoxia and hypercapnia. Psychiatric consultation is requested due to agitation and concerns of suicide - per documentation suicide ideation suspected in the ED as patient removed himself from vent multiple times. I saw patient previously for psychiatric follow up here 12/26/25 after initial psychiatric consultation at that admission- Seroquel increase was considered at that time for agitation. Patient was discharged on Seroquel 100 mg daily and 150 mg QHS. Per chart patient was seen by PCP on 1/15/25 who noted family discontinued daytime Seroquel due to sedation, had been doing well with Seroquel at night. Per documentation patient's family reported patient  having increased agitation over the prior week with patient removing PEG tube x 2. In the ED patient had become increasingly agitation, removing himself from the ventilator, requiring Zyprexa and ativan, and was placed in restraints.     Patient this morning on the vent.  He was arousable to physical stimulation.  He did not respond verbally nor communicate otherwise.  He made brief eye contact and then was looking out of his periphery until eventually closing his eyes again.  I attempted to discuss various topics including reports of recent increase in agitation, depression, sleep.  I asked about reports of him attempting to remove his ventilator and if patient was trying to end his life.  When asked about if he currently wishes he were dead, if he wants to kill himself, if he was trying to kill himself by removing the vent patient did not meaningfully respond.  During some of these questions his left shoulder appeared to move however unclear if this is just involuntary spasm.  He exhibited agitation, pulling against L soft wrist restraint, attempting to pull at pad underneath him. Per nurse patient was hitting against the bed rail.      Historical Information   Past Psychiatric History:   Per chart previously denied history of inpatient hospitalization, seeing a psychiatrist, sees PCP  Past Suicide attempts: Per chart 1 prior attempt, did not disclose how  Past Psychiatric medication trial: Seroquel      Substance Abuse History:  Unable to assess   Social History       Tobacco History       Smoking Status  Former Smoking Start Date  1/1/2008 Quit Date  6/1/2023 Average Packs/Day  0.7 packs/day for 22.7 years (15.0 ttl pk-yrs) Smoking Tobacco Type  Cigarettes from 1/1/2008 to 6/1/2023   Pack Year History     Packs/Day From To Years    0 6/1/2023  1.7    0.5 1/1/2008 6/1/2023 15.4    1   7.3      Passive Exposure  Never      Smokeless Tobacco Use  Never              Alcohol History       Alcohol Use Status  Not  "Currently Drinks/Week  0 Glasses of wine, 0 Cans of beer, 0 Shots of liquor, 0 Standard drinks or equivalent per week Comment  Former alcoholic. Last use in 2016              Drug Use       Drug Use Status  Not Currently Types  \"Crack\" cocaine, Marijuana Comment  Current use medical marijuana. Not currently using crack cocaine.              Sexual Activity       Sexually Active  Not Currently Partners  Female Birth Control/Protection  Other              Other Factors    Not Asked                     Family Psychiatric History:   Unable to assess    Social History  Unable to assess  Living arrangement, social support: Family      Past Medical History:   Diagnosis Date    Anxiety     Cancer ([facility])     Depression     Hypernatremia 10/06/2024    Migration of percutaneous endoscopic gastrostomy (PEG) tube ([facility]) 09/24/2023    Psychiatric disorder     bipolar    Sepsis ([facility]) 08/24/2023     Past Surgical History:   Procedure Laterality Date    IR BIOPSY LYMPH NODE  01/20/2023    IR GASTROSTOMY (G) TUBE CHECK/CHANGE/REINSERTION/UPSIZE  6/18/2024    IR GASTROSTOMY TUBE PLACEMENT  09/25/2023    IR LUMBAR PUNCTURE  09/06/2023    IR LUMBAR PUNCTURE  10/25/2024    IR PORT PLACEMENT  03/14/2023    ORCHIECTOMY Left 12/14/2022    Procedure: ORCHIECTOMY INGUINAL;  Surgeon: Flip Michael MD;  Location:  MAIN OR;  Service: Urology    PEG W/TRACHEOSTOMY PLACEMENT N/A 09/08/2023    Procedure: TRACHEOSTOMY WITH INSERTION PEG TUBE;  Surgeon: uRdy Mcmanus MD;  Location: [facility] MAIN OR;  Service: General    TESTICLE SURGERY             Meds/Allergies     all current active meds have been reviewed and current meds:   Current Facility-Administered Medications:     acetaminophen (TYLENOL) oral suspension 650 mg, Q4H PRN    acetylcysteine (MUCOMYST) 200 mg/mL inhalation solution 600 mg, Q6H    apixaban (ELIQUIS) tablet 5 mg, BID    chlorhexidine (PERIDEX) 0.12 % oral rinse 15 mL, Q12H ISAEL    ipratropium-albuterol (DUO-NEB) 0.5-2.5 mg/3 mL " inhalation solution 3 mL, Q6H    LORazepam (ATIVAN) injection 0.5 mg, Q4H PRN    QUEtiapine (SEROquel) tablet 150 mg, HS  Allergies   Allergen Reactions    Dog Epithelium Cough, Sneezing and Nasal Congestion       Objective     Vital signs in last 24 hours:  Temp:  [95.8 °F (35.4 °C)-97.6 °F (36.4 °C)] 97.2 °F (36.2 °C)  HR:  [55-85] 85  BP: ()/() 94/52  Resp:  [18-20] 18  SpO2:  [96 %-100 %] 97 %  O2 Device: Ventilator  FiO2 (%):  [40] 40      Intake/Output Summary (Last 24 hours) at 2/14/2025 1107  Last data filed at 2/14/2025 0658  Gross per 24 hour   Intake 125 ml   Output 0 ml   Net 125 ml       Mental Status Evaluation:  Appearance:  appears stated age, bearded, laying in bed, and L soft wrist restraint   Behavior:  uncooperative and psychomotor agitation   Speech:  Does not speak   Mood:  Uncertain   Affect:  Constricted   Thought Process:  Unable to assess   Thought Content:  Unable to assess   Perceptual Disturbances: Unable to assess   Risk Potential: Unable to assess   Sensorium:  Unable to assess   Memory:  Unable to assess   Cognition:  Unable to assess   Consciousness:  alert and awake    Attention: Unable to assess   Insight:  Unable to assess   Judgment: Unable to assess   Muscle Strength and Tone: Unable to assess   Motor Activity: Spasm of muscles around left shoulder?     Lab Results:    Most Recent Labs:   Lab Results   Component Value Date    WBC 4.70 02/14/2025    RBC 3.36 (L) 02/14/2025    HGB 9.8 (L) 02/14/2025    HCT 31.0 (L) 02/14/2025     02/14/2025    RDW 14.7 02/14/2025    NEUTROABS 2.85 02/14/2025    SODIUM 139 02/14/2025    K 2.8 (L) 02/14/2025    CL 96 02/14/2025    CO2 27 02/14/2025    BUN 15 02/14/2025    CREATININE 0.32 (L) 02/14/2025    GLUC 94 02/14/2025    GLUF 89 09/26/2023    CALCIUM 9.4 02/14/2025    AST 14 02/13/2025    ALT 21 02/13/2025    ALKPHOS 100 02/13/2025    TP 8.3 02/13/2025    ALB 4.3 02/13/2025    TBILI 0.38 02/13/2025    CHOLESTEROL 183  12/10/2022    HDL 45 12/10/2022    TRIG 280 (H) 12/10/2022    LDLCALC 82 12/10/2022    NONHDLC 138 12/10/2022    AMMONIA 19 06/10/2024    KZD7DJGPMGBG 2.090 12/16/2024    PREGSERUM Negative 10/11/2024    HGBA1C 5.3 11/01/2023     11/01/2023     Labs in last 72 hours:   Recent Labs     02/13/25 2103 02/14/25  0458   WBC 7.91 4.70   RBC 3.96 3.36*   HGB 11.5* 9.8*   HCT 38.3 31.0*    207   RDW 15.1 14.7   NEUTROABS  --  2.85   SODIUM 139 139   K 4.6 2.8*   CL 94* 96   CO2 41* 27   BUN 16 15   CREATININE 0.41* 0.32*   GLUC 85 94   CALCIUM 10.2 9.4   AST 14  --    ALT 21  --    ALKPHOS 100  --    TP 8.3  --    ALB 4.3  --    TBILI 0.38  --        Drug Screen:   Lab Results   Component Value Date    AMPMETHUR Negative 12/16/2024    BARBTUR Negative 12/16/2024    BDZUR Positive (A) 12/16/2024    THCUR Positive (A) 12/16/2024    COCAINEUR Negative 12/16/2024    METHADONEUR Negative 12/16/2024    OPIATEUR Negative 12/16/2024    PCPUR Negative 12/16/2024     Vitamin D Level   Lab Results   Component Value Date    XTZC98DZQOFD 17.4 (L) 10/23/2024     Vitamin B12   Lab Results   Component Value Date    NYNCYPUY49 630 10/23/2024     Folate   Lab Results   Component Value Date    FOLATE 6.2 10/23/2024       Imaging Studies:   EKG/Pathology/Other Studies:   Lab Results   Component Value Date    VENTRATE 75 02/13/2025    ATRIALRATE 75 02/13/2025    PRINT 156 02/13/2025    QRSDINT 86 02/13/2025    QTINT 364 02/13/2025    QTCINT 406 02/13/2025    PAXIS 64 02/13/2025    QRSAXIS 70 02/13/2025    TWAVEAXIS 47 02/13/2025        Code Status: Level 1 - Full Code  Advance Directive and Living Will:      Power of : Yes  POLST:            Bashir Craft,   02/14/25      This note has been constructed using a voice recognition system.    There may be translation, syntax,  or grammatical errors. If you have any questions, please contact the dictating provider.

## 2025-02-14 NOTE — SEPSIS NOTE
Sepsis Note   Hemanth Swanson 39 y.o. male MRN: [id number]  Unit/Bed#: ICU 04 Encounter: [id number]       Initial Sepsis Screening       Row Name 02/14/25 1544                Is the patient's history suggestive of a new or worsening infection? No  -MS                  User Key  (r) = Recorded By, (t) = Taken By, (c) = Cosigned By      Initials Name Provider Type    MS AMBER Dean Nurse Practitioner                        Body mass index is 27.45 kg/m².  Wt Readings from Last 1 Encounters:   02/14/25 102 kg (225 lb 8.5 oz)     IBW (Ideal Body Weight): 86.8 kg    Ideal body weight: 86.8 kg (191 lb 5.7 oz)  Adjusted ideal body weight: 93 kg (205 lb 0.4 oz)

## 2025-02-14 NOTE — RESTRAINT FACE TO FACE
Restraint Face to Face   Hemanthcapri Bishopgwendolyn 39 y.o. male MRN: [id number]  Unit/Bed#: ED 14 Encounter: [id number]      Physical Evaluation Patient disconnected his vent tubing, is uncooperative with redirection, and gives middle finger when advised of importance of keeping vent tubing in place  Purpose for Restraints/ Seclusion High risk for self harm  Patient's reaction to the intervention improved, no longer pulling at tube  Patient's medical condition stable  Patient's Behavioral condition agitated  Restraints to be Continued

## 2025-02-14 NOTE — PROGRESS NOTES
Progress Note - Critical Care/ICU   Name: Hemanth Swanson 39 y.o. male I MRN: [id number]  Unit/Bed#: ICU 04 I Date of Admission: 2/14/2025   Date of Service: 2/14/2025 I Hospital Day: 0       Hemanth is more awake and alert this afternoon.  He nodded appropriately to questions and followed commands with all extremities. I told him he was in the hospital after being agitated and pulling at his trach/PEG.  He acknowledge and shook his head yes that he is aware he was doing that at home. I then asked him if he was intentionally trying to cause self harm by pulling himself off of the ventilator and he shook his head no. He denies pain, discomfort, or shortness of breath at this time. He is aware his sister is planning on visiting today.

## 2025-02-14 NOTE — ED PROVIDER NOTES
Time reflects when diagnosis was documented in both MDM as applicable and the Disposition within this note       Time User Action Codes Description Comment    2/14/2025 12:11 AM Bashir Benavidez [J96.22] Acute on chronic respiratory failure with hypercapnia (HCC)     2/14/2025 12:46 AM Bashir Benavidez [Z43.1] PEG (percutaneous endoscopic gastrostomy) adjustment/replacement/removal (HCC)     2/14/2025  1:02 AM Bashir Benavidez [T14.91XA] Suicide attempt (HCC)           ED Disposition       ED Disposition   Transfer to Another Facility-In Network    Condition   --    Date/Time   Fri Feb 14, 2025 12:46 AM    Comment   Hemanth Swanson should be transferred out to [place].               Assessment & Plan       Medical Decision Making  PEG tube - Patient pulled peg tube, require replacement in ED.  Hypercarbia - Suspect failure of patient's home ventilator as hypercarbia resolved with switch to [facility] ventilator despite same vent settings.  Suicidal ideation - Patient removed himself from the vent multiple times and apparent suicide attempt.  Case discussed with critical care team    Amount and/or Complexity of Data Reviewed  Labs: ordered. Decision-making details documented in ED Course.  Radiology: ordered and independent interpretation performed.    Risk  Prescription drug management.        ED Course as of 02/14/25 0217   u Feb 13, 2025 2043 Patient arrived due to PEG being pulled but family notes more agitation than normal, so will obtain screening tests to evaluate for infection.  Patient does have a thumb wound which, while not appearing infected, could be causing pain and explain amina agitation, although this would be a diagnosis of exclusion.  Awaiting further tests.   2047 EKG: SR at 75 with normal QRS, normal ST-t, QTc 406   2110 WBC: 7.91  Not suggestive of infection.   2122 LACTIC ACID: 1.1  Not suggestive of sepsis or ischemia.   2134 Procalcitonin: <0.05  Reassuring for absence of severe  infection.   2201 pH, Rasheed(!): 7.292   2201 pCO2, Rasheed(!!): 76.7  Contacting respiratory.   2220 Suctioning performed with improvement in peak pressure.  Will obtain repeat VBG to assess for changes in pH and CO2.  If no improvement, will place patient on out ventilator.  Air leaking form tubing attachment at patient's home ventilator.  RT unsure if this is by design from whisper valve versus an air leak.  Family contacted and also note sure, advising they do not use that blue tubing at home and they do not have a replacement tube.   2346 Repeat pCO2 75.4 so I ordered patient to be placed on our vent.   2357 Patient placed on our ventilator at same settings as home vent to evaluate for vent malfunction.   Fri Feb 14, 2025 0045 Case discussed with Dr. Vanessa who accepted patient to [place].   0056 pCO2, Rasheed(!): 56.0  Greatly improved when transferred to hospital vent despite using the same settings.  This suggests problem was initially caused by vent malfunction, probably secondary to tubing leak.  Will proceed with transfer as no alternative tubing is available per family, and patient his home vent dependent.   0100 Patient is scooting himself in the bed, apparently purposefully trying to detach vent.  Ativan ordered.  Patient made 1:1.       Medications   OLANZapine (ZyPREXA) IM injection 10 mg (10 mg Intramuscular Given 2/14/25 0000)   sterile water injection **ADS Override Pull** (10 mL  Given 2/14/25 0000)   LORazepam (ATIVAN) injection 2 mg (2 mg Intravenous Given 2/14/25 0123)       ED Risk Strat Scores                                              History of Present Illness       Chief Complaint   Patient presents with    Medical Problem     Feeding tube dislodged       Past Medical History:   Diagnosis Date    Anxiety     Cancer (HCC)     Depression     Hypernatremia 10/06/2024    Migration of percutaneous endoscopic gastrostomy (PEG) tube (HCC) 09/24/2023    Psychiatric disorder     bipolar    Sepsis ([facility])  "2023      Past Surgical History:   Procedure Laterality Date    IR BIOPSY LYMPH NODE  2023    IR GASTROSTOMY (G) TUBE CHECK/CHANGE/REINSERTION/UPSIZE  2024    IR GASTROSTOMY TUBE PLACEMENT  2023    IR LUMBAR PUNCTURE  2023    IR LUMBAR PUNCTURE  10/25/2024    IR PORT PLACEMENT  2023    ORCHIECTOMY Left 2022    Procedure: ORCHIECTOMY INGUINAL;  Surgeon: Flip Michael MD;  Location:  MAIN OR;  Service: Urology    PEG W/TRACHEOSTOMY PLACEMENT N/A 2023    Procedure: TRACHEOSTOMY WITH INSERTION PEG TUBE;  Surgeon: Rudy Mcmanus MD;  Location: [facility];  Service: General    TESTICLE SURGERY        Family History   Problem Relation Age of Onset    Coronary artery disease Maternal Aunt     Cancer Father     Diabetes Father     Hypertension Father       Social History     Tobacco Use    Smoking status: Former     Current packs/day: 0.00     Average packs/day: 0.7 packs/day for 22.7 years (15.0 ttl pk-yrs)     Types: Cigarettes     Start date: 2008     Quit date: 2023     Years since quittin.7     Passive exposure: Never    Smokeless tobacco: Never   Vaping Use    Vaping status: Former    Start date: 2018    Quit date: 2023    Substances: Nicotine, THC   Substance Use Topics    Alcohol use: Not Currently     Comment: Former alcoholic. Last use in     Drug use: Not Currently     Types: \"Crack\" cocaine, Marijuana     Comment: Current use medical marijuana. Not currently using crack cocaine.      E-Cigarette/Vaping    E-Cigarette Use Former User     Start Date 18     Quit Date 23       E-Cigarette/Vaping Substances    Nicotine Yes     THC Yes     CBD No     Flavoring No     Other No     Unknown No       I have reviewed and agree with the history as documented.     Family reports the patient pulled out his PEG tube shortly prior to arrival.  They note that he has been acting more irritable than normal.  He has been picking at a wound on " his left thumb that has had for a few weeks and is not allowing to heal.  Just not getting better nor worse.  He has not been having fevers but family notes that he oftentimes has infections when he is more agitated, and they are concerned that he might have an occult infection.    Patient is uncooperative.  Rather than answer questions, he gives middle finger to me and other assisting staff.      History limited by:  Patient nonverbal  Medical Problem      Review of Systems   Unable to perform ROS: Patient nonverbal (Patient uncooperative)           Objective       ED Triage Vitals [02/13/25 2015]   Temperature Pulse Blood Pressure Respirations SpO2 Patient Position - Orthostatic VS   97.6 °F (36.4 °C) 77 (!) 141/101 20 97 % Lying      Temp Source Heart Rate Source BP Location FiO2 (%) Pain Score    Tympanic Monitor Left arm -- --      Vitals      Date and Time Temp Pulse SpO2 Resp BP Pain Score FACES Pain Rating User   02/14/25 0122 -- 70 98 % 18 137/81 -- -- [facility]   02/13/25 2355 -- 84 99 % 20 142/95 -- -- [facility]   02/13/25 2015 97.6 °F (36.4 °C) 77 97 % 20 141/101 -- -- [facility]            Physical Exam  Vitals and nursing note reviewed.   Constitutional:       General: He is not in acute distress.     Appearance: He is well-developed.   HENT:      Head: Normocephalic and atraumatic.      Mouth/Throat:      Comments: edentulous  Eyes:      Conjunctiva/sclera: Conjunctivae normal.   Neck:      Comments: Tracheostomy tube present and patent  Cardiovascular:      Rate and Rhythm: Normal rate and regular rhythm.      Heart sounds: No murmur heard.  Pulmonary:      Effort: Pulmonary effort is normal. No respiratory distress.      Breath sounds: Normal breath sounds.   Abdominal:      Palpations: Abdomen is soft.      Tenderness: There is no abdominal tenderness.      Hernia: A hernia (Umbilical hernia, soft.  Chronic per family.) is present.   Musculoskeletal:         General: No swelling.      Cervical back: Neck supple.    Skin:     General: Skin is warm and dry.      Capillary Refill: Capillary refill takes less than 2 seconds.      Comments: Diaphoresis    Wound on left thumb at IP joint approximately 3 x 4 mm, mild surrounding pink skin but no erythema, no lymphangitis, no bone tenderness   Neurological:      Mental Status: He is alert. Mental status is at baseline.   Psychiatric:         Mood and Affect: Mood normal.         Results Reviewed       Procedure Component Value Units Date/Time    Blood gas, venous [[id number]]  (Abnormal) Collected: 02/14/25 0047    Lab Status: Final result Specimen: Blood from Arm, Right Updated: 02/14/25 0053     pH, Rasheed 7.377     pCO2, Rasheed 56.0 mm Hg      pO2, Rasheed 118.3 mm Hg      HCO3, Rasheed 32.2 mmol/L      Base Excess, Rasheed 5.7 mmol/L      O2 Content, Rasheed 16.3 ml/dL      O2 HGB, VENOUS 96.5 %     Blood gas, venous [[id number]]  (Abnormal) Collected: 02/13/25 2334    Lab Status: Final result Specimen: Blood from Arm, Right Updated: 02/13/25 2348     pH, Rasheed 7.271     pCO2, Rasheed 75.4 mm Hg      pO2, Rasheed 162.7 mm Hg      HCO3, Rasheed 33.9 mmol/L      Base Excess, Rasheed 4.8 mmol/L      O2 Content, Rasheed 17.4 ml/dL      O2 HGB, VENOUS 97.4 %     RBC Morphology Reflex Test [[id number]] Collected: 02/13/25 2103    Lab Status: Final result Specimen: Blood from Arm, Right Updated: 02/13/25 2301    CBC and differential [[id number]]  (Abnormal) Collected: 02/13/25 2103    Lab Status: Final result Specimen: Blood from Arm, Right Updated: 02/13/25 2203     WBC 7.91 Thousand/uL      RBC 3.96 Million/uL      Hemoglobin 11.5 g/dL      Hematocrit 38.3 %      MCV 97 fL      MCH 29.0 pg      MCHC 30.0 g/dL      RDW 15.1 %      MPV 9.3 fL      Platelets 266 Thousands/uL     Narrative:      This is an appended report.  These results have been appended to a previously verified report.    Manual Differential(PHLEBS Do Not Order) [[id number]]  (Abnormal) Collected: 02/13/25 2103    Lab Status: Final result Specimen: Blood from Arm,  Right Updated: 02/13/25 2203     Segmented % 69 %      Lymphocytes % 20 %      Monocytes % 4 %      Eosinophils % 2 %      Basophils % 1 %      Myelocytes % 4 %      Absolute Neutrophils 5.46 Thousand/uL      Absolute Lymphocytes 1.58 Thousand/uL      Absolute Monocytes 0.32 Thousand/uL      Absolute Eosinophils 0.16 Thousand/uL      Absolute Basophils 0.08 Thousand/uL      Absolute Myelocytes 0.32 Thousand/uL      Total Counted --     RBC Morphology Present     Platelet Estimate Adequate     Clumped Platelets Present     Large Platelet Present     Anisocytosis Present     Basophilic Stippling Present     Macrocytes Present     Poikilocytes Present     Polychromasia Present    Blood gas, venous [[id number]]  (Abnormal) Collected: 02/13/25 2144    Lab Status: Final result Specimen: Blood from Arm, Right Updated: 02/13/25 2152     pH, Rasheed 7.292     pCO2, Rasheed 76.7 mm Hg      pO2, Rasheed 64.1 mm Hg      HCO3, Rasheed 36.2 mmol/L      Base Excess, Rasheed 7.1 mmol/L      O2 Content, Rasheed 15.8 ml/dL      O2 HGB, VENOUS 88.3 %     Procalcitonin [[id number]]  (Normal) Collected: 02/13/25 2103    Lab Status: Final result Specimen: Blood from Arm, Right Updated: 02/13/25 2133     Procalcitonin <0.05 ng/ml     UA w Reflex to Microscopic w Reflex to Culture [[id number]]  (Normal) Collected: 02/13/25 2116    Lab Status: Final result Specimen: Urine, Straight Cath Updated: 02/13/25 2125     Color, UA Yellow     Clarity, UA Clear     Specific Gravity, UA >=1.030     pH, UA 6.0     Leukocytes, UA Negative     Nitrite, UA Negative     Protein, UA Negative mg/dl      Glucose, UA Negative mg/dl      Ketones, UA Negative mg/dl      Urobilinogen, UA 0.2 E.U./dl      Bilirubin, UA Negative     Occult Blood, UA Negative    Lipase [[id number]]  (Normal) Collected: 02/13/25 2103    Lab Status: Final result Specimen: Blood from Arm, Right Updated: 02/13/25 2125     Lipase 28 u/L     Comprehensive metabolic panel [[id number]]  (Abnormal) Collected: 02/13/25  2103    Lab Status: Final result Specimen: Blood from Arm, Right Updated: 02/13/25 2125     Sodium 139 mmol/L      Potassium 4.6 mmol/L      Chloride 94 mmol/L      CO2 41 mmol/L      ANION GAP 4 mmol/L      BUN 16 mg/dL      Creatinine 0.41 mg/dL      Glucose 85 mg/dL      Calcium 10.2 mg/dL      AST 14 U/L      ALT 21 U/L      Alkaline Phosphatase 100 U/L      Total Protein 8.3 g/dL      Albumin 4.3 g/dL      Total Bilirubin 0.38 mg/dL      eGFR 150 ml/min/1.73sq m     Narrative:      National Kidney Disease Foundation guidelines for Chronic Kidney Disease (CKD):     Stage 1 with normal or high GFR (GFR > 90 mL/min/1.73 square meters)    Stage 2 Mild CKD (GFR = 60-89 mL/min/1.73 square meters)    Stage 3A Moderate CKD (GFR = 45-59 mL/min/1.73 square meters)    Stage 3B Moderate CKD (GFR = 30-44 mL/min/1.73 square meters)    Stage 4 Severe CKD (GFR = 15-29 mL/min/1.73 square meters)    Stage 5 End Stage CKD (GFR <15 mL/min/1.73 square meters)  Note: GFR calculation is accurate only with a steady state creatinine    Lactic acid, plasma (w/reflex if result > 2.0) [[id number]]  (Normal) Collected: 02/13/25 2103    Lab Status: Final result Specimen: Blood from Arm, Right Updated: 02/13/25 2122     LACTIC ACID 1.1 mmol/L     Narrative:      Result may be elevated if tourniquet was used during collection.    FLU/COVID Rapid Antigen (30 min. TAT) - Preferred screening test in ED [[id number]]  (Normal) Collected: 02/13/25 2040    Lab Status: Final result Specimen: Nares from Nose Updated: 02/13/25 2102     SARS COV Rapid Antigen Negative     Influenza A Rapid Antigen Negative     Influenza B Rapid Antigen Negative    Narrative:      This test has been performed using the [employer] Viviana 2 FLU+SARS Antigen test under the Emergency Use Authorization (EUA). This test has been validated by the  and verified by the performing laboratory. The Viviana uses lateral flow immunofluorescent sandwich assay to detect SARS-COV,  Influenza A and Influenza B Antigen.     The [employer] Viviana 2 SARS Antigen test does not differentiate between SARS-CoV and SARS-CoV-2.     Negative results are presumptive and may be confirmed with a molecular assay, if necessary, for patient management. Negative results do not rule out SARS-CoV-2 or influenza infection and should not be used as the sole basis for treatment or patient management decisions. A negative test result may occur if the level of antigen in a sample is below the limit of detection of this test.     Positive results are indicative of the presence of viral antigens, but do not rule out bacterial infection or co-infection with other viruses.     All test results should be used as an adjunct to clinical observations and other information available to the provider.    FOR PEDIATRIC PATIENTS - copy/paste COVID Guidelines URL to browser: [email or web address]            XR abdomen 1 view kub   ED Interpretation by Bashir Benavidez MD (02/13 2256)   No extravasation of contrast      XR chest 1 view portable   ED Interpretation by Bashir Benavidez MD (02/13 2134)   Poor inspiration with improved aeration from prior.      XR abdomen 1 view kub   ED Interpretation by Bashir Benavidez MD (02/13 2137)   Cannot evaluate PEG without gastrograffin.  Non-specific bowel pattern.  Increased fecal burden.          Feeding Tube    Date/Time: 2/13/2025 8:55 PM    Performed by: Bashir Benavidez MD  Authorized by: Bashir Benavidez MD    Pre-procedure details:     Old tube size:  16 Fr  Indications:     Indications: tube dislodged    Anesthesia (see MAR for exact dosages):     Anesthesia method:  None  Procedure details:     Patient position:  Supine    Procedure type:  Replacement    Tube type:  Gastrostomy    Tube size:  16 Fr    Bulb inflation volume:  6ml    Bulb inflation fluid:  Normal saline  Post-procedure details:     Placement/position  "confirmation:  X-ray    Placement difficulty:  None    Bleeding:  None    Patient tolerance of procedure:  Tolerated well, no immediate complications      ED Medication and Procedure Management   Prior to Admission Medications   Prescriptions Last Dose Informant Patient Reported? Taking?   Incontinence Supply Disposable (Comfort Shield Adult Diapers) MISC  Family Member No No   Sig: Use 1 each 4 (four) times a day   NEEDLE, DISP, 18 G 18G X 1\" MISC   No No   Sig: Use 2 (two) times a week   Oral Hygiene Products (Toothette Swabs/Dentifrice) SWAB  Family Member No No   Sig: Apply to the mouth or throat 3 (three) times a day   QUEtiapine Fumarate 150 MG TABS   No No   Sig: Take 150 mg by mouth daily at bedtime   Syringe/Needle, Disp, (Syringe Luer Slip) 25G X 5/8\" 1 ML MISC   No No   Sig: Use 2 (two) times a week   acetylcysteine (MUCOMYST) 200 mg/mL nebulizer solution   No No   Sig: Take 3 mL (600 mg total) by nebulization every 6 (six) hours   apixaban (ELIQUIS) 5 mg  Family Member No No   Si tablet (5 mg total) by Per PEG Tube route 2 (two) times a day   ipratropium-albuterol (DUO-NEB) 0.5-2.5 mg/3 mL nebulizer solution   No No   Sig: Take 3 mL by nebulization every 6 (six) hours      Facility-Administered Medications: None     Discharge Medication List as of 2025  1:46 AM        CONTINUE these medications which have NOT CHANGED    Details   acetylcysteine (MUCOMYST) 200 mg/mL nebulizer solution Take 3 mL (600 mg total) by nebulization every 6 (six) hours, Starting 2024, Normal      apixaban (ELIQUIS) 5 mg 1 tablet (5 mg total) by Per PEG Tube route 2 (two) times a day, Starting Wed 10/18/2023, Normal      Incontinence Supply Disposable (Comfort Shield Adult Diapers) MISC Use 1 each 4 (four) times a day, Starting Wed 10/18/2023, Normal      ipratropium-albuterol (DUO-NEB) 0.5-2.5 mg/3 mL nebulizer solution Take 3 mL by nebulization every 6 (six) hours, Starting 2024, Normal      NEEDLE, " "DISP, 18 G 18G X 1\" MISC Use 2 (two) times a week, Starting Thu 8/22/2024, Normal      Oral Hygiene Products (Toothette Swabs/Dentifrice) SWAB Apply to the mouth or throat 3 (three) times a day, Starting Wed 10/18/2023, Normal      QUEtiapine Fumarate 150 MG TABS Take 150 mg by mouth daily at bedtime, Starting Wed 1/15/2025, Normal      Syringe/Needle, Disp, (Syringe Luer Slip) 25G X 5/8\" 1 ML MISC Use 2 (two) times a week, Starting Thu 8/22/2024, Normal           No discharge procedures on file.  ED SEPSIS DOCUMENTATION   Time reflects when diagnosis was documented in both MDM as applicable and the Disposition within this note       Time User Action Codes Description Comment    2/14/2025 12:11 AM Bashir Benavidez [J96.22] Acute on chronic respiratory failure with hypercapnia ([facility])     2/14/2025 12:46 AM Bashir Benavidez [Z43.1] PEG (percutaneous endoscopic gastrostomy) adjustment/replacement/removal ([facility])     2/14/2025  1:02 AM Bashir Benavidez [T14.91XA] Suicide attempt ([facility])                  Bashir Benavidez MD  02/14/25 0217    "

## 2025-02-14 NOTE — ASSESSMENT & PLAN NOTE
Patient self removed on 2/9 and PEG tube was replaced  Patient again self removed on 2/13 and PEG tube was replaced

## 2025-02-15 ENCOUNTER — APPOINTMENT (EMERGENCY)
Dept: RADIOLOGY | Facility: HOSPITAL | Age: 40
End: 2025-02-15
Payer: COMMERCIAL

## 2025-02-15 ENCOUNTER — HOSPITAL ENCOUNTER (OUTPATIENT)
Facility: HOSPITAL | Age: 40
Setting detail: OBSERVATION
Discharge: HOME HEALTH | End: 2025-02-18
Admitting: INTERNAL MEDICINE
Payer: COMMERCIAL

## 2025-02-15 VITALS
DIASTOLIC BLOOD PRESSURE: 74 MMHG | SYSTOLIC BLOOD PRESSURE: 122 MMHG | RESPIRATION RATE: 18 BRPM | BODY MASS INDEX: 27.62 KG/M2 | HEART RATE: 91 BPM | WEIGHT: 226.85 LBS | TEMPERATURE: 98.4 F | HEIGHT: 76 IN | OXYGEN SATURATION: 97 %

## 2025-02-15 DIAGNOSIS — R45.1 AGITATION: ICD-10-CM

## 2025-02-15 DIAGNOSIS — G62.89 MIXED AXONAL-DEMYELINATING POLYNEUROPATHY: Chronic | ICD-10-CM

## 2025-02-15 DIAGNOSIS — Z99.11 VENTILATOR DEPENDENT (HCC): Chronic | ICD-10-CM

## 2025-02-15 DIAGNOSIS — F32.A DEPRESSION, UNSPECIFIED DEPRESSION TYPE: ICD-10-CM

## 2025-02-15 DIAGNOSIS — J96.11 CHRONIC RESPIRATORY FAILURE WITH HYPOXIA AND HYPERCAPNIA (HCC): ICD-10-CM

## 2025-02-15 DIAGNOSIS — R45.851 SUICIDE IDEATION: ICD-10-CM

## 2025-02-15 DIAGNOSIS — J96.12 CHRONIC RESPIRATORY FAILURE WITH HYPOXIA AND HYPERCAPNIA (HCC): ICD-10-CM

## 2025-02-15 DIAGNOSIS — Z93.1 S/P PERCUTANEOUS ENDOSCOPIC GASTROSTOMY (PEG) TUBE PLACEMENT (HCC): Chronic | ICD-10-CM

## 2025-02-15 DIAGNOSIS — F41.9 ANXIETY: ICD-10-CM

## 2025-02-15 DIAGNOSIS — F39 MOOD DISORDER (HCC): ICD-10-CM

## 2025-02-15 DIAGNOSIS — J96.21 ACUTE ON CHRONIC RESPIRATORY FAILURE WITH HYPOXIA AND HYPERCAPNIA (HCC): Primary | ICD-10-CM

## 2025-02-15 DIAGNOSIS — Z86.711 HISTORY OF PULMONARY EMBOLUS (PE): ICD-10-CM

## 2025-02-15 DIAGNOSIS — Z51.5 PALLIATIVE CARE BY SPECIALIST: ICD-10-CM

## 2025-02-15 DIAGNOSIS — J96.22 ACUTE ON CHRONIC RESPIRATORY FAILURE WITH HYPOXIA AND HYPERCAPNIA (HCC): Primary | ICD-10-CM

## 2025-02-15 PROBLEM — R65.10 SIRS (SYSTEMIC INFLAMMATORY RESPONSE SYNDROME) (HCC): Status: ACTIVE | Noted: 2025-02-15

## 2025-02-15 PROBLEM — I26.99 ACUTE PULMONARY EMBOLISM WITHOUT ACUTE COR PULMONALE (HCC): Status: RESOLVED | Noted: 2023-08-23 | Resolved: 2025-02-15

## 2025-02-15 PROBLEM — D72.829 LEUKOCYTOSIS: Status: ACTIVE | Noted: 2025-02-15

## 2025-02-15 PROBLEM — Z71.89 GOALS OF CARE, COUNSELING/DISCUSSION: Status: RESOLVED | Noted: 2024-10-11 | Resolved: 2025-02-15

## 2025-02-15 PROBLEM — R00.1 BRADYCARDIA: Status: RESOLVED | Noted: 2024-06-01 | Resolved: 2025-02-15

## 2025-02-15 PROBLEM — G92.8 TOXIC METABOLIC ENCEPHALOPATHY: Status: RESOLVED | Noted: 2024-10-07 | Resolved: 2025-02-15

## 2025-02-15 LAB
ALBUMIN SERPL BCG-MCNC: 3.4 G/DL (ref 3.5–5)
ALP SERPL-CCNC: 98 U/L (ref 34–104)
ALT SERPL W P-5'-P-CCNC: 15 U/L (ref 7–52)
ANION GAP SERPL CALCULATED.3IONS-SCNC: 15 MMOL/L (ref 4–13)
ANION GAP SERPL CALCULATED.3IONS-SCNC: 9 MMOL/L (ref 4–13)
APTT PPP: 38 SECONDS (ref 23–34)
AST SERPL W P-5'-P-CCNC: 13 U/L (ref 13–39)
BASE EX.OXY STD BLDV CALC-SCNC: 62.2 % (ref 60–80)
BASE EX.OXY STD BLDV CALC-SCNC: 79.2 % (ref 60–80)
BASE EX.OXY STD BLDV CALC-SCNC: 85 % (ref 60–80)
BASE EXCESS BLDV CALC-SCNC: -0.2 MMOL/L
BASE EXCESS BLDV CALC-SCNC: 1.9 MMOL/L
BASE EXCESS BLDV CALC-SCNC: 5.6 MMOL/L
BASOPHILS # BLD AUTO: 0.02 THOUSANDS/ΜL (ref 0–0.1)
BASOPHILS NFR BLD AUTO: 0 % (ref 0–1)
BILIRUB SERPL-MCNC: 0.45 MG/DL (ref 0.2–1)
BUN SERPL-MCNC: 12 MG/DL (ref 5–25)
BUN SERPL-MCNC: 12 MG/DL (ref 5–25)
CALCIUM ALBUM COR SERPL-MCNC: 9.4 MG/DL (ref 8.3–10.1)
CALCIUM SERPL-MCNC: 8.9 MG/DL (ref 8.4–10.2)
CALCIUM SERPL-MCNC: 9.3 MG/DL (ref 8.4–10.2)
CHLORIDE SERPL-SCNC: 96 MMOL/L (ref 96–108)
CHLORIDE SERPL-SCNC: 99 MMOL/L (ref 96–108)
CO2 SERPL-SCNC: 24 MMOL/L (ref 21–32)
CO2 SERPL-SCNC: 27 MMOL/L (ref 21–32)
CREAT SERPL-MCNC: 0.41 MG/DL (ref 0.6–1.3)
CREAT SERPL-MCNC: 0.45 MG/DL (ref 0.6–1.3)
EOSINOPHIL # BLD AUTO: 0.01 THOUSAND/ΜL (ref 0–0.61)
EOSINOPHIL NFR BLD AUTO: 0 % (ref 0–6)
ERYTHROCYTE [DISTWIDTH] IN BLOOD BY AUTOMATED COUNT: 16.3 % (ref 11.6–15.1)
ERYTHROCYTE [DISTWIDTH] IN BLOOD BY AUTOMATED COUNT: 16.7 % (ref 11.6–15.1)
FLUAV AG UPPER RESP QL IA.RAPID: NEGATIVE
FLUBV AG UPPER RESP QL IA.RAPID: NEGATIVE
GFR SERPL CREATININE-BSD FRML MDRD: 144 ML/MIN/1.73SQ M
GFR SERPL CREATININE-BSD FRML MDRD: 150 ML/MIN/1.73SQ M
GLUCOSE SERPL-MCNC: 74 MG/DL (ref 65–140)
GLUCOSE SERPL-MCNC: 79 MG/DL (ref 65–140)
HCO3 BLDV-SCNC: 27.6 MMOL/L (ref 24–30)
HCO3 BLDV-SCNC: 28.4 MMOL/L (ref 24–30)
HCO3 BLDV-SCNC: 29 MMOL/L (ref 24–30)
HCT VFR BLD AUTO: 30.2 % (ref 36.5–49.3)
HCT VFR BLD AUTO: 32.4 % (ref 36.5–49.3)
HGB BLD-MCNC: 10.1 G/DL (ref 12–17)
HGB BLD-MCNC: 9.7 G/DL (ref 12–17)
IMM GRANULOCYTES # BLD AUTO: 0.09 THOUSAND/UL (ref 0–0.2)
IMM GRANULOCYTES NFR BLD AUTO: 1 % (ref 0–2)
INR PPP: 1.38 (ref 0.85–1.19)
LACTATE SERPL-SCNC: 1 MMOL/L (ref 0.5–2)
LYMPHOCYTES # BLD AUTO: 1.17 THOUSANDS/ΜL (ref 0.6–4.47)
LYMPHOCYTES NFR BLD AUTO: 8 % (ref 14–44)
MAGNESIUM SERPL-MCNC: 2 MG/DL (ref 1.9–2.7)
MCH RBC QN AUTO: 28.9 PG (ref 26.8–34.3)
MCH RBC QN AUTO: 29.3 PG (ref 26.8–34.3)
MCHC RBC AUTO-ENTMCNC: 31.2 G/DL (ref 31.4–37.4)
MCHC RBC AUTO-ENTMCNC: 32.1 G/DL (ref 31.4–37.4)
MCV RBC AUTO: 91 FL (ref 82–98)
MCV RBC AUTO: 93 FL (ref 82–98)
MONOCYTES # BLD AUTO: 0.6 THOUSAND/ΜL (ref 0.17–1.22)
MONOCYTES NFR BLD AUTO: 4 % (ref 4–12)
MRSA NOSE QL CULT: NORMAL
NEUTROPHILS # BLD AUTO: 13.08 THOUSANDS/ΜL (ref 1.85–7.62)
NEUTS SEG NFR BLD AUTO: 87 % (ref 43–75)
NRBC BLD AUTO-RTO: 0 /100 WBCS
O2 CT BLDV-SCNC: 10 ML/DL
O2 CT BLDV-SCNC: 12.4 ML/DL
O2 CT BLDV-SCNC: 13 ML/DL
PCO2 BLDV: 37.8 MM HG (ref 42–50)
PCO2 BLDV: 47.9 MM HG (ref 42–50)
PCO2 BLDV: 68.3 MM HG (ref 42–50)
PH BLDV: 7.24 [PH] (ref 7.3–7.4)
PH BLDV: 7.38 [PH] (ref 7.3–7.4)
PH BLDV: 7.5 [PH] (ref 7.3–7.4)
PHOSPHATE SERPL-MCNC: 4.5 MG/DL (ref 2.7–4.5)
PLATELET # BLD AUTO: 213 THOUSANDS/UL (ref 149–390)
PLATELET # BLD AUTO: 218 THOUSANDS/UL (ref 149–390)
PMV BLD AUTO: 9.6 FL (ref 8.9–12.7)
PMV BLD AUTO: 9.7 FL (ref 8.9–12.7)
PO2 BLDV: 40.5 MM HG (ref 35–45)
PO2 BLDV: 41.8 MM HG (ref 35–45)
PO2 BLDV: 53.6 MM HG (ref 35–45)
POTASSIUM SERPL-SCNC: 3.9 MMOL/L (ref 3.5–5.3)
POTASSIUM SERPL-SCNC: 4.4 MMOL/L (ref 3.5–5.3)
PROCALCITONIN SERPL-MCNC: 0.12 NG/ML
PROCALCITONIN SERPL-MCNC: 0.23 NG/ML
PROT SERPL-MCNC: 7.1 G/DL (ref 6.4–8.4)
PROTHROMBIN TIME: 17.5 SECONDS (ref 12.3–15)
RBC # BLD AUTO: 3.31 MILLION/UL (ref 3.88–5.62)
RBC # BLD AUTO: 3.49 MILLION/UL (ref 3.88–5.62)
SARS-COV+SARS-COV-2 AG RESP QL IA.RAPID: NEGATIVE
SODIUM SERPL-SCNC: 135 MMOL/L (ref 135–147)
SODIUM SERPL-SCNC: 135 MMOL/L (ref 135–147)
WBC # BLD AUTO: 14.97 THOUSAND/UL (ref 4.31–10.16)
WBC # BLD AUTO: 16.48 THOUSAND/UL (ref 4.31–10.16)

## 2025-02-15 PROCEDURE — 85025 COMPLETE CBC W/AUTO DIFF WBC: CPT

## 2025-02-15 PROCEDURE — 99285 EMERGENCY DEPT VISIT HI MDM: CPT

## 2025-02-15 PROCEDURE — 36415 COLL VENOUS BLD VENIPUNCTURE: CPT

## 2025-02-15 PROCEDURE — 99291 CRITICAL CARE FIRST HOUR: CPT

## 2025-02-15 PROCEDURE — 82805 BLOOD GASES W/O2 SATURATION: CPT

## 2025-02-15 PROCEDURE — 87811 SARS-COV-2 COVID19 W/OPTIC: CPT

## 2025-02-15 PROCEDURE — 84100 ASSAY OF PHOSPHORUS: CPT | Performed by: NURSE PRACTITIONER

## 2025-02-15 PROCEDURE — 94003 VENT MGMT INPAT SUBQ DAY: CPT

## 2025-02-15 PROCEDURE — 99238 HOSP IP/OBS DSCHRG MGMT 30/<: CPT | Performed by: PHYSICIAN ASSISTANT

## 2025-02-15 PROCEDURE — 94640 AIRWAY INHALATION TREATMENT: CPT

## 2025-02-15 PROCEDURE — 94668 MNPJ CHEST WALL SBSQ: CPT

## 2025-02-15 PROCEDURE — 80048 BASIC METABOLIC PNL TOTAL CA: CPT | Performed by: NURSE PRACTITIONER

## 2025-02-15 PROCEDURE — 93005 ELECTROCARDIOGRAM TRACING: CPT

## 2025-02-15 PROCEDURE — 85610 PROTHROMBIN TIME: CPT

## 2025-02-15 PROCEDURE — 83735 ASSAY OF MAGNESIUM: CPT | Performed by: NURSE PRACTITIONER

## 2025-02-15 PROCEDURE — 82805 BLOOD GASES W/O2 SATURATION: CPT | Performed by: NURSE PRACTITIONER

## 2025-02-15 PROCEDURE — 84145 PROCALCITONIN (PCT): CPT | Performed by: NURSE PRACTITIONER

## 2025-02-15 PROCEDURE — 94150 VITAL CAPACITY TEST: CPT

## 2025-02-15 PROCEDURE — 84145 PROCALCITONIN (PCT): CPT

## 2025-02-15 PROCEDURE — 94760 N-INVAS EAR/PLS OXIMETRY 1: CPT

## 2025-02-15 PROCEDURE — 71045 X-RAY EXAM CHEST 1 VIEW: CPT

## 2025-02-15 PROCEDURE — 85730 THROMBOPLASTIN TIME PARTIAL: CPT

## 2025-02-15 PROCEDURE — NC001 PR NO CHARGE: Performed by: INTERNAL MEDICINE

## 2025-02-15 PROCEDURE — 87040 BLOOD CULTURE FOR BACTERIA: CPT

## 2025-02-15 PROCEDURE — 83605 ASSAY OF LACTIC ACID: CPT

## 2025-02-15 PROCEDURE — 99223 1ST HOSP IP/OBS HIGH 75: CPT | Performed by: NURSE PRACTITIONER

## 2025-02-15 PROCEDURE — 87804 INFLUENZA ASSAY W/OPTIC: CPT

## 2025-02-15 PROCEDURE — 94664 DEMO&/EVAL PT USE INHALER: CPT

## 2025-02-15 PROCEDURE — 94669 MECHANICAL CHEST WALL OSCILL: CPT

## 2025-02-15 PROCEDURE — 96374 THER/PROPH/DIAG INJ IV PUSH: CPT

## 2025-02-15 PROCEDURE — 80053 COMPREHEN METABOLIC PANEL: CPT

## 2025-02-15 PROCEDURE — 85027 COMPLETE CBC AUTOMATED: CPT | Performed by: NURSE PRACTITIONER

## 2025-02-15 PROCEDURE — 87081 CULTURE SCREEN ONLY: CPT | Performed by: INTERNAL MEDICINE

## 2025-02-15 PROCEDURE — 94002 VENT MGMT INPAT INIT DAY: CPT

## 2025-02-15 RX ORDER — SODIUM CHLORIDE FOR INHALATION 3 %
4 VIAL, NEBULIZER (ML) INHALATION
Status: DISCONTINUED | OUTPATIENT
Start: 2025-02-15 | End: 2025-02-16

## 2025-02-15 RX ORDER — LORAZEPAM 2 MG/ML
0.5 INJECTION INTRAMUSCULAR ONCE
Status: COMPLETED | OUTPATIENT
Start: 2025-02-15 | End: 2025-02-15

## 2025-02-15 RX ORDER — PANTOPRAZOLE SODIUM 40 MG/10ML
40 INJECTION, POWDER, LYOPHILIZED, FOR SOLUTION INTRAVENOUS
Status: DISCONTINUED | OUTPATIENT
Start: 2025-02-16 | End: 2025-02-17

## 2025-02-15 RX ORDER — LORAZEPAM 2 MG/ML
1 INJECTION INTRAMUSCULAR EVERY 6 HOURS PRN
Status: DISCONTINUED | OUTPATIENT
Start: 2025-02-15 | End: 2025-02-18 | Stop reason: HOSPADM

## 2025-02-15 RX ORDER — QUETIAPINE FUMARATE 25 MG/1
50 TABLET, FILM COATED ORAL ONCE
Status: COMPLETED | OUTPATIENT
Start: 2025-02-15 | End: 2025-02-15

## 2025-02-15 RX ORDER — QUETIAPINE FUMARATE 25 MG/1
150 TABLET, FILM COATED ORAL ONCE
Status: COMPLETED | OUTPATIENT
Start: 2025-02-15 | End: 2025-02-15

## 2025-02-15 RX ORDER — LORAZEPAM 2 MG/ML
0.5 INJECTION INTRAMUSCULAR EVERY 4 HOURS PRN
Status: DISCONTINUED | OUTPATIENT
Start: 2025-02-15 | End: 2025-02-15

## 2025-02-15 RX ORDER — IPRATROPIUM BROMIDE AND ALBUTEROL SULFATE 2.5; .5 MG/3ML; MG/3ML
3 SOLUTION RESPIRATORY (INHALATION)
Status: DISCONTINUED | OUTPATIENT
Start: 2025-02-15 | End: 2025-02-18 | Stop reason: HOSPADM

## 2025-02-15 RX ORDER — LORAZEPAM 2 MG/ML
1 INJECTION INTRAMUSCULAR ONCE
Status: COMPLETED | OUTPATIENT
Start: 2025-02-15 | End: 2025-02-15

## 2025-02-15 RX ORDER — CHLORHEXIDINE GLUCONATE ORAL RINSE 1.2 MG/ML
15 SOLUTION DENTAL EVERY 12 HOURS SCHEDULED
Status: DISCONTINUED | OUTPATIENT
Start: 2025-02-15 | End: 2025-02-18 | Stop reason: HOSPADM

## 2025-02-15 RX ORDER — ACETAMINOPHEN 160 MG/5ML
650 SUSPENSION ORAL EVERY 6 HOURS PRN
Status: DISCONTINUED | OUTPATIENT
Start: 2025-02-15 | End: 2025-02-18 | Stop reason: HOSPADM

## 2025-02-15 RX ORDER — SODIUM CHLORIDE, SODIUM GLUCONATE, SODIUM ACETATE, POTASSIUM CHLORIDE, MAGNESIUM CHLORIDE, SODIUM PHOSPHATE, DIBASIC, AND POTASSIUM PHOSPHATE .53; .5; .37; .037; .03; .012; .00082 G/100ML; G/100ML; G/100ML; G/100ML; G/100ML; G/100ML; G/100ML
500 INJECTION, SOLUTION INTRAVENOUS ONCE
Status: COMPLETED | OUTPATIENT
Start: 2025-02-15 | End: 2025-02-15

## 2025-02-15 RX ORDER — QUETIAPINE FUMARATE 100 MG/1
200 TABLET, FILM COATED ORAL
Status: DISCONTINUED | OUTPATIENT
Start: 2025-02-16 | End: 2025-02-18 | Stop reason: HOSPADM

## 2025-02-15 RX ADMIN — QUETIAPINE FUMARATE 150 MG: 25 TABLET ORAL at 21:11

## 2025-02-15 RX ADMIN — SODIUM CHLORIDE SOLN NEBU 3% 4 ML: 3 NEBU SOLN at 22:25

## 2025-02-15 RX ADMIN — SODIUM CHLORIDE, SODIUM GLUCONATE, SODIUM ACETATE, POTASSIUM CHLORIDE, MAGNESIUM CHLORIDE, SODIUM PHOSPHATE, DIBASIC, AND POTASSIUM PHOSPHATE 500 ML: .53; .5; .37; .037; .03; .012; .00082 INJECTION, SOLUTION INTRAVENOUS at 21:56

## 2025-02-15 RX ADMIN — APIXABAN 5 MG: 5 TABLET, FILM COATED ORAL at 22:54

## 2025-02-15 RX ADMIN — LORAZEPAM 1 MG: 2 INJECTION INTRAMUSCULAR; INTRAVENOUS at 06:05

## 2025-02-15 RX ADMIN — QUETIAPINE FUMARATE 50 MG: 25 TABLET ORAL at 22:55

## 2025-02-15 RX ADMIN — ACETYLCYSTEINE 600 MG: 200 SOLUTION ORAL; RESPIRATORY (INHALATION) at 07:50

## 2025-02-15 RX ADMIN — LORAZEPAM 0.5 MG: 2 INJECTION INTRAMUSCULAR; INTRAVENOUS at 12:00

## 2025-02-15 RX ADMIN — ACETYLCYSTEINE 600 MG: 200 SOLUTION ORAL; RESPIRATORY (INHALATION) at 14:07

## 2025-02-15 RX ADMIN — IPRATROPIUM BROMIDE AND ALBUTEROL SULFATE 3 ML: .5; 3 SOLUTION RESPIRATORY (INHALATION) at 07:50

## 2025-02-15 RX ADMIN — IPRATROPIUM BROMIDE AND ALBUTEROL SULFATE 3 ML: .5; 3 SOLUTION RESPIRATORY (INHALATION) at 01:25

## 2025-02-15 RX ADMIN — IPRATROPIUM BROMIDE AND ALBUTEROL SULFATE 3 ML: .5; 3 SOLUTION RESPIRATORY (INHALATION) at 22:25

## 2025-02-15 RX ADMIN — CHLORHEXIDINE GLUCONATE 15 ML: 1.2 SOLUTION ORAL at 22:54

## 2025-02-15 RX ADMIN — IPRATROPIUM BROMIDE AND ALBUTEROL SULFATE 3 ML: .5; 3 SOLUTION RESPIRATORY (INHALATION) at 14:07

## 2025-02-15 RX ADMIN — ACETYLCYSTEINE 600 MG: 200 SOLUTION ORAL; RESPIRATORY (INHALATION) at 01:25

## 2025-02-15 RX ADMIN — APIXABAN 5 MG: 5 TABLET, FILM COATED ORAL at 08:18

## 2025-02-15 RX ADMIN — CHLORHEXIDINE GLUCONATE 15 ML: 1.2 SOLUTION ORAL at 08:18

## 2025-02-15 RX ADMIN — LORAZEPAM 1 MG: 2 INJECTION INTRAMUSCULAR; INTRAVENOUS at 16:40

## 2025-02-15 NOTE — ASSESSMENT & PLAN NOTE
"Status post trach for neuromuscular disease  Vent dependent at baseline  VBG obtained at [facility] revealing 7.29/77/64/36  Repeat VBG was obtained without any improvement in hypercapnia  Patient was transitioned from home ventilator to hospital ventilator with reported \" leak\" in home ventilator  Improvement in hypercapnia on same home settings with hospital equipment    Plan:  Continue vent support  18/500/8/40%   New home equipment delivery has been arranged  VAP bundle; chlorhexidine, PPI, HOB greater than 30 degrees   Pulmonary toilet; PRN ETT suctioning, duonebs and mucomyst nebs q6h, chest PT TID  "

## 2025-02-15 NOTE — UTILIZATION REVIEW
Initial Clinical Review    Admission: Date/Time/Statement:   Admission Orders (From admission, onward)       Ordered        02/14/25 0153  Inpatient Admission  Once                          Orders Placed This Encounter   Procedures    Inpatient Admission     Standing Status:   Standing     Number of Occurrences:   1     Level of Care:   Critical Care [15]     Estimated length of stay:   More than 2 Midnights     Certification:   I certify that inpatient services are medically necessary for this patient for a duration of greater than two midnights. See H&P and MD Progress Notes for additional information about the patient's course of treatment.     Initial Presentation: transferred from [facility]  39 y.o. who presents with agitation and self dislodgment of PEG tube.  Patient has a past medical history of mixed axonal demyelinating polyneuropathy, trach/vent dependent, PEG tube in place, and history of PE on Eliquis. Upon arrival to [facility], family expressed concerns of increased agitation and concerns of sepsis.  PEG tube was replaced.  Lab work was largely unremarkable except for VBG.  Patient was found to be hypercapnic on home ventilator.  He was switched over to hospital ventilator on same home settings with improvement in CO2. Patient also became increasingly agitated at [facility] with self removing himself from the ventilator.  He required Zyprexa and Ativan and placement of restraints.  Transferred to [facility] & admitted to inpatient status for acute on chronic respiratory failure with hypoxia & hypercapnia fr ventilator mgt.     Per psyche: mood disorder  Agitation and concerns of suicide - per documentation suicide ideation suspected in the ED as patient removed himself from vent multiple times.   Continue observation  Recommend patient be screened by [facility] for determination of inpatient psychiatric hospitalization vs clearance from psychiatric perspective for discharge  Increase Seroquel  to 200 mg QHS  Can utilize Seroquel PRN agitation    Date: 2/15/25   Day 2:   Ventilator mgt ongoing. Trach & PEG in place. R sided breath sounds clear, L sided coarse & diminished. Seen by psych yesterday and seroquel increased to 200mg @ HS. Patient awake and alert this morning and able to nod no when asked if he is having thoughts of harming himself. Also denies trying to previously harm himself and nodded yes when asked if he was pulling at trach and PEG because he felt agitated. Tube feeding continued. Remains on virtual observation for safety. Psyche following.       Scheduled Medications:  acetylcysteine, 3 mL, Nebulization, Q6H  apixaban, 5 mg, Per PEG Tube, BID  chlorhexidine, 15 mL, Mouth/Throat, Q12H ISAEL  ipratropium-albuterol, 3 mL, Nebulization, Q6H  QUEtiapine, 200 mg, Per PEG Tube, HS    PRN Meds:  acetaminophen, 650 mg, Per PEG Tube, Q6H PRN  QUEtiapine, 25 mg, Oral, BID PRN      ED Triage Vitals [02/14/25 0208]   Temperature Pulse Respirations Blood Pressure SpO2 Pain Score   (!) 95.8 °F (35.4 °C) 55 20 125/78 100 % --     Weight (last 2 days)       Date/Time Weight    02/15/25 0600 103 (226.85)    02/14/25 0200 102 (225.53)            Vital Signs (last 3 days)       Date/Time Temp Pulse Resp BP MAP (mmHg) SpO2 FiO2 (%) O2 Device Patient Position - Orthostatic VS Purvi Coma Scale Score    02/15/25 0900 -- 102 18 93/55 70 97 % -- -- -- --    02/15/25 0800 98.2 °F (36.8 °C) 84 18 103/67 81 98 % -- -- -- --    02/15/25 0755 -- -- -- -- -- 98 % -- -- -- --    02/15/25 0700 -- 91 18 94/55 70 97 % -- -- -- --    02/15/25 0600 -- 104 20 100/74 83 97 % -- -- -- --    02/15/25 0500 -- 96 18 106/67 82 98 % -- -- -- --    02/15/25 0400 98.2 °F (36.8 °C) 105 18 95/56 70 98 % -- -- -- 11    02/15/25 0300 -- 103 18 95/56 70 96 % -- -- -- --    02/15/25 0200 -- 101 18 91/55 68 96 % -- -- -- --    02/15/25 0100 -- 97 18 94/60 72 97 % -- -- -- --    02/15/25 0030 -- -- -- -- -- -- -- -- -- 10    02/15/25 0000 --  94 18 91/60 71 96 % -- -- -- --    02/14/25 2300 98 °F (36.7 °C) 104 18 87/54 66 95 % -- -- -- --    02/14/25 2100 -- 93 18 100/55 71 97 % -- -- -- --    02/14/25 2030 -- -- -- -- -- -- -- -- -- 10    02/14/25 2000 98.4 °F (36.9 °C) 84 18 100/63 76 97 % -- -- -- --    02/14/25 1930 97.2 °F (36.2 °C) -- -- -- -- -- -- -- -- --    02/14/25 1800 -- 113 20 163/73 90 99 % -- -- -- --    02/14/25 1700 -- 98 18 105/64 78 99 % -- -- -- --    02/14/25 1600 97.4 °F (36.3 °C) 84 18 101/65 77 98 % 40 Ventilator Lying 10    02/14/25 1543 96.5 °F (35.8 °C) -- -- -- -- -- -- -- -- --    02/14/25 1300 -- 79 26 97/65 75 98 % -- -- -- --    02/14/25 1200 97.4 °F (36.3 °C) 80 18 98/63 74 99 % 40 Ventilator Lying 10    02/14/25 1100 -- 81 18 91/57 68 97 % -- -- -- --    02/14/25 1000 -- 84 18 92/53 67 97 % -- -- -- --    02/14/25 0900 -- 85 18 94/52 66 97 % -- -- -- --    02/14/25 0800 97.2 °F (36.2 °C) 72 18 88/51 64 97 % 40 Ventilator Lying 10    02/14/25 0600 -- 65 18 94/60 72 97 % -- -- -- --    02/14/25 0500 96 °F (35.6 °C) 65 18 95/60 72 96 % -- -- -- --    02/14/25 0400 -- 65 18 94/57 70 96 % -- -- -- --    02/14/25 0300 -- 64 18 97/56 70 96 % -- -- -- --    02/14/25 0248 -- -- -- -- -- 97 % -- -- -- --    02/14/25 0230 -- -- -- -- -- -- -- -- -- 10    02/14/25 0222 -- -- -- -- -- 97 % -- -- -- --    02/14/25 0215 -- 58 18 117/80 94 98 % -- -- -- --    02/14/25 0208 95.8 °F (35.4 °C) 55 20 125/78 96 100 % 40 Ventilator Lying --              Pertinent Labs/Diagnostic Test Results:   Radiology:  No orders to display     Cardiology:  No orders to display     GI:  No orders to display           Results from last 7 days   Lab Units 02/15/25  0527 02/14/25  0458 02/13/25  2103   WBC Thousand/uL 16.48* 4.70 7.91   HEMOGLOBIN g/dL 9.7* 9.8* 11.5*   HEMATOCRIT % 30.2* 31.0* 38.3   PLATELETS Thousands/uL 213 207 266   TOTAL NEUT ABS Thousands/µL  --  2.85  --          Results from last 7 days   Lab Units 02/15/25  0527 02/14/25  0458  "02/13/25 2103   SODIUM mmol/L 135 139 139   POTASSIUM mmol/L 4.4 2.8* 4.6   CHLORIDE mmol/L 96 96 94*   CO2 mmol/L 24 27 41*   ANION GAP mmol/L 15* 16* 4   BUN mg/dL 12 15 16   CREATININE mg/dL 0.41* 0.32* 0.41*   EGFR ml/min/1.73sq m 150 166 150   CALCIUM mg/dL 9.3 9.4 10.2   MAGNESIUM mg/dL 2.0 1.9  --    PHOSPHORUS mg/dL 4.5 1.8*  --      Results from last 7 days   Lab Units 02/13/25 2103   AST U/L 14   ALT U/L 21   ALK PHOS U/L 100   TOTAL PROTEIN g/dL 8.3   ALBUMIN g/dL 4.3   TOTAL BILIRUBIN mg/dL 0.38     Results from last 7 days   Lab Units 02/09/25  1923   POC GLUCOSE mg/dl 75     Results from last 7 days   Lab Units 02/15/25  0527 02/14/25  0458 02/13/25  2103   GLUCOSE RANDOM mg/dL 79 94 85             No results found for: \"BETA-HYDROXYBUTYRATE\"       Results from last 7 days   Lab Units 02/15/25  0539 02/14/25  0458 02/14/25  0047   PH SYLVESTER  7.503* 7.576* 7.377   PCO2 SYLVESTER mm Hg 37.8* 31.8* 56.0*   PO2 SYLVESTER mm Hg 41.8 61.6* 118.3*   HCO3 SYLVESTER mmol/L 29.0 28.9 32.2*   BASE EXC SYLVESTER mmol/L 5.6 6.9 5.7   O2 CONTENT SYLVESTER ml/dL 12.4 14.1 16.3   O2 HGB, VENOUS % 79.2 93.8* 96.5*                             Results from last 7 days   Lab Units 02/15/25  0527 02/14/25 0458 02/13/25 2103   PROCALCITONIN ng/ml 0.12 <0.05 <0.05     Results from last 7 days   Lab Units 02/13/25 2103   LACTIC ACID mmol/L 1.1                                 Results from last 7 days   Lab Units 02/13/25 2103   LIPASE u/L 28                 Results from last 7 days   Lab Units 02/13/25 2116   CLARITY UA  Clear   COLOR UA  Yellow   SPEC GRAV UA  >=1.030   PH UA  6.0   GLUCOSE UA mg/dl Negative   KETONES UA mg/dl Negative   BLOOD UA  Negative   PROTEIN UA mg/dl Negative   NITRITE UA  Negative   BILIRUBIN UA  Negative   UROBILINOGEN UA E.U./dl 0.2   LEUKOCYTES UA  Negative                                                   Past Medical History:   Diagnosis Date    Anxiety     Cancer (HCC)     Depression     Hypernatremia 10/06/2024    " Migration of percutaneous endoscopic gastrostomy (PEG) tube (HCC) 09/24/2023    Psychiatric disorder     bipolar    Sepsis (HCC) 08/24/2023     Present on Admission:   Acute on chronic respiratory failure with hypoxia and hypercapnia (HCC)   Agitation   History of pulmonary embolus (PE)   Mixed axonal-demyelinating polyneuropathy   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HCC)   Mood disorder (HCC)   (Resolved) Toxic metabolic encephalopathy      Admitting Diagnosis: Acute on chronic respiratory failure with hypercapnia (HCC) [J96.22]  Age/Sex: 39 y.o. male    Network Utilization Review Department  ATTENTION: Please call with any questions or concerns to [phone] and carefully listen to the prompts so that you are directed to the right person. All voicemails are confidential.   For Discharge needs, contact Care Management DC Support Team at [phone] opt. [phone]  Send all requests for admission clinical reviews, approved or denied determinations and any other requests to dedicated fax number below belonging to the campus where the patient is receiving treatment. List of dedicated fax numbers for the Facilities:  FACILITY NAME UR FAX NUMBER   ADMISSION DENIALS (Administrative/Medical Necessity) [phone]   DISCHARGE SUPPORT TEAM (NETWORK) [phone]   PARENT CHILD HEALTH (Maternity/NICU/Pediatrics) [phone]   General acute hospital [phone]   [facility] [phone]   [facility] [phone]   [facility] [phone]   [facility] [phone]   [facility] [phone]   [facility] [phone]   [facility] [phone]   [facility] [phone]   [facility] [phone]   [facility] [phone]   [facility]  [facility] [phone]

## 2025-02-15 NOTE — CASE MANAGEMENT
Case Management Discharge Planning Note    Patient name Hemanth Swanson  Location ICU 04/ICU 04 MRN [id number]  : 1985 Date 2/15/2025       Current Admission Date: 2025  Current Admission Diagnosis:Acute on chronic respiratory failure with hypoxia and hypercapnia (HCC)   Patient Active Problem List    Diagnosis Date Noted Date Diagnosed    Mood disorder (HCC) 2024     Abnormal movements 2024     Agitation 2024     History of pulmonary embolus (PE) 2024     Rash of back 10/25/2024     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HCC) 10/17/2024     Palliative care by specialist 10/11/2024     Goals of care, counseling/discussion 10/11/2024     Cardiac arrest due to other underlying condition ([facility]) 2024     Seizure-like activity ([facility]) 2024     Bradycardia 2024     Ventilator dependent ([facility]) 2024     Obesity hypoventilation syndrome ([facility]) 10/24/2023     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     Impaired mobility 2023     Status post tracheostomy ([facility]) 2023     S/P percutaneous endoscopic gastrostomy (PEG) tube placement ([facility]) 2023     Anxiety 2023     Acute on chronic respiratory failure with hypoxia and hypercapnia ([facility]) 2023     Acute pulmonary embolism without acute cor pulmonale ([facility]) 2023     Depression 2023     History of LVH (left ventricular hypertrophy) 2023     Pure hypertriglyceridemia 2023     Unilateral vestibular schwannoma (HCC) 2023     Port-A-Cath in place 2023     Diplopia 2023     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HCC) 2023     Umbilical hernia without obstruction and without gangrene 12/10/2022     Tobacco use 12/10/2022     Retroperitoneal lymphadenopathy 12/10/2022       LOS (days): 1  Geometric Mean LOS (GMLOS) (days):   Days to GMLOS:     OBJECTIVE:  Risk of Unplanned Readmission Score: 34.21         Current admission status: Inpatient   Preferred Pharmacy:   [id number] [place] [id number] [address]  Phone: [phone] Fax: [phone]    Primary Care Provider: Ela Douglas MD    Primary Insurance: [facility]  Secondary Insurance:     DISCHARGE DETAILS:    Discharge planning discussed with:: Sister Dmitry  Freedom of Choice: Yes  Comments - Freedom of Choice: Choice is for patient to return home with DEE DEE C services through [facility].  CM contacted family/caregiver?: Yes  Were Treatment Team discharge recommendations reviewed with patient/caregiver?: Yes  Did patient/caregiver verbalize understanding of patient care needs?: Yes  Were patient/caregiver advised of the risks associated with not following Treatment Team discharge recommendations?: Yes    Contacts  Patient Contacts: Dmitry Harrisherbert (sister)  Relationship to Patient:: Family  Contact Method: Phone  Phone Number: [phone]  Reason/Outcome: Continuity of Care, Emergency Contact, Discharge Planning    Requested Home Health Care         Is the patient interested in HHC at discharge?: Yes  Home Health Discipline requested:: Occupational Therapy, Physical Therapy, Nursing  Home Health Agency Name:: [facility] External Referral Reason (only applicable if external HHA name selected): Patient has established relationship with provider  Home Health Follow-Up Provider:: PCP  Home Health Services Needed:: Evaluate Functional Status and Safety, Gait/ADL Training, Strengthening/Theraputic Exercises to Improve Function, Other (comment) (Vent/Trach care; Tube Feedings)  Homebound Criteria Met:: Requires the Assistance of Another Person for Safe Ambulation or to Leave the Home, Uses an Assist Device (i.e. cane, walker, etc)  Supporting Clincal Findings:: Requires Oxygen, Limited Endurance, Fatigues Easliy in Short Distances     Other Referral/Resources/Interventions Provided:  Interventions: HHC, Transportation  Referral Comments: DEE DEE  referral sent via [facility] to [facility]. AVS to be faxed when available.     Treatment Team Recommendation: Home with Home Health Care  Discharge Destination Plan:: Home with Home Health Care  Transport at Discharge : [facility] Nurse Level     Number/Name of Dispatcher: [facility]  Transported by (Company and Unit #): [facility]  ETA of Transport (Date): 02/15/25  ETA of Transport (Time):  (TBD)

## 2025-02-15 NOTE — ASSESSMENT & PLAN NOTE
Suspect secondary to sedating agents   More sedated overnight after receiving PRN ativan in the evening and 200 seroquel @ HS   Delirium precautions; regulate sleep/wake cycle, environmental controls, daily CAM ICU   Neuro checks q4h

## 2025-02-15 NOTE — ASSESSMENT & PLAN NOTE
S/p trach for neuromuscular disease  Chronic settings of 18/500/8/40%  There was previous concern for home vent malfunction when he was admitted on 2/13 - per sister, plan from company was to use transport ventilator until new ventilator can be supplied. However patient was hypotensive en route and re-routed back to ED, also concern for missing hook up piece to temporary transport vent which was been rectified    Unclear why hypercarbic after arrival to ED again - should have been on [facility] transport vent during transport. ?mucus plugging/atelectasis  Initial VBG 7.37/68.3/40.5/28.4/-0.2   Repeat VBG normalized 7.37/47.9/53.6/27.6/1.9  Episode of hypoxia to 50-60s x 2 in the ED, patient does have hx of this with intentional breath holding when he becomes agitated, also may be d/t mucus plugging to some degree  Pulmonary toilet; continue home duo-nebs q6h, add 3% nebs q6h and chest PT TID   Maintain O2 sat 92% or above   VAP preventions; chlorhexidine, PPI, HOB greater than 30 degrees

## 2025-02-15 NOTE — ED PROVIDER NOTES
Time reflects when diagnosis was documented in both MDM as applicable and the Disposition within this note       Time User Action Codes Description Comment    2/15/2025  5:49 PM Jay Prieto Add [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     2/15/2025  5:49 PM Jay Prieto Add [Z99.11] Ventilator dependent (HCC)     2/15/2025  5:49 PM Jay Prieto Add [Z93.1] S/P percutaneous endoscopic gastrostomy (PEG) tube placement (HCC)     2/15/2025  5:49 PM Jay Prieto Add [Z86.711] History of pulmonary embolus (PE)     2/15/2025  5:50 PM Jay Prieto Add [Z71.1] Feared complaint without diagnosis     2/15/2025  5:50 PM Jay Prieto 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     2/15/2025  5:50 PM Jay Prieto Modify [Z71.1] Feared complaint without diagnosis     2/15/2025  9:33 PM Jay Prieto Modify [Z99.11] Ventilator dependent (HCC)     2/15/2025  9:33 PM Jay Prieto Modify [Z71.1] Feared complaint without diagnosis     2/15/2025  9:33 PM Jay Prieto Remove [Z71.1] Feared complaint without diagnosis     2/15/2025  9:33 PM Jay Prieto Add [J96.21,  J96.22] Acute on chronic respiratory failure with hypoxia and hypercapnia (HCC)     2/15/2025  9:33 PM Jay Prieto Modify [Z99.11] Ventilator dependent (HCC)     2/15/2025  9:33 PM Jay Prieto Modify [J96.21,  J96.22] Acute on chronic respiratory failure with hypoxia and hypercapnia (HCC)           ED Disposition       ED Disposition   Admit    Condition   Stable    Date/Time   Sat Feb 15, 2025  9:33 PM    Comment   Case was discussed with Dr. Alas and the patient's admission status was agreed to be Admission Status: observation status to the service of Dr. Alas.               Assessment & Plan       Medical Decision Making  Amount and/or Complexity of Data Reviewed  Labs: ordered.  Radiology: ordered and independent interpretation performed.  ECG/medicine tests:  Decision-making details documented  in ED Course.    Risk  Prescription drug management.  Decision regarding hospitalization.      Addendum 2/15/2025 2145: Pt with back to back episodes of desaturations. In-line suctioned with improvement. D/w ICU and will plan to monitor in ICU overnight.      38 y/o M with demyelinating polyneuropathy, peg/trach dependent, recent ICU admission for ventilator management returns to ED quickly after ICU discharge this morning for evaluation of hypotention. Per EMS who was transporting patient home, patient had back to back low blood pressure readings first 90s systolic then 80s. Received 250ml bolus per them and BP now 120s systolic. Pt provides no hx.    Tachycardic in ED  RT at bedside to manage ventilator -- see ED course    Given hypotension episode and tachycardia, will obtain sepsis w/u including blood cultures. Pt had some sepsis labs drawn previously and felt with no acute infectious findings.     Pt ultimately SIRS+ with no identifiable source of infection. Agree with previous ICU notes, will hold abx at this time pending cultures. Patient has no episodes of hypotension in the ED with no additional interventions. Given benign w/u, will discharge back to home as originally planned.      Critical Care Time Statement: Upon my evaluation, this patient had a high probability of imminent or life-threatening deterioration due to hypercapnic, hypoxic respiratory failure on ventilator, hypotension, which required my direct attention, intervention, and personal management.  I spent a total of 35 minutes directly providing critical care services, including evaluating for the presence of life-threatening injuries or illnesses, management of organ system failure(s) , complex medical decision making (to support/prevent further life-threatening deterioration)., and ventilator management. This time is exclusive of procedures, teaching, treating other patients, family meetings, and any prior time recorded by providers other  than myself.        ED Course as of 02/15/25 2145   Sat Feb 15, 2025   1619 Called to room, patient desat to 50%. Disconnected from vent and bagged through trach up to 100%. RT put back on vent now with no further issues, unclear issue, seemed like the vent was not delivering breaths. Vitals now stable   1642 ECG 12 lead  Procedure Note: EKG  Date/Time: 02/15/25 4:42 PM   Interpreted by: Jay Prieto MD  Indications / Diagnosis: tachycardia  ECG reviewed by me, the ED Provider: yes   The EKG demonstrates:  Rhythm: sinus tachycardia  Intervals: normal intervals  Axis: normal axis  QRS/Blocks: normal QRS  ST Changes: No acute ST Changes, no STD/ADIEL.  S1Q3T3 pattern present. Pt chronically anticoagulated, doubt PE       Medications   LORazepam (ATIVAN) injection 1 mg (1 mg Intravenous Given 2/15/25 1640)   QUEtiapine (SEROquel) tablet 150 mg (150 mg Per PEG Tube Given 2/15/25 2111)       ED Risk Strat Scores                                                History of Present Illness       Chief Complaint   Patient presents with    Hypotension     [facility] he was [facility] home after being d.c from [facility] ICU. Reported that pt became hypotensive and diaphoresis.  Pt has Vent. Non verbal        Past Medical History:   Diagnosis Date    Anxiety     Cancer (HCC)     Depression     Hypernatremia 10/06/2024    Migration of percutaneous endoscopic gastrostomy (PEG) tube (HCC) 09/24/2023    Psychiatric disorder     bipolar    Sepsis (HCC) 08/24/2023      Past Surgical History:   Procedure Laterality Date    IR BIOPSY LYMPH NODE  01/20/2023    IR GASTROSTOMY (G) TUBE CHECK/CHANGE/REINSERTION/UPSIZE  6/18/2024    IR GASTROSTOMY TUBE PLACEMENT  09/25/2023    IR LUMBAR PUNCTURE  09/06/2023    IR LUMBAR PUNCTURE  10/25/2024    IR PORT PLACEMENT  03/14/2023    ORCHIECTOMY Left 12/14/2022    Procedure: ORCHIECTOMY INGUINAL;  Surgeon: Flip Michael MD;  Location: [facility];  Service: Urology    PEG  "W/TRACHEOSTOMY PLACEMENT N/A 2023    Procedure: TRACHEOSTOMY WITH INSERTION PEG TUBE;  Surgeon: Rudy Mcmanus MD;  Location: [facility];  Service: General    TESTICLE SURGERY        Family History   Problem Relation Age of Onset    Coronary artery disease Maternal Aunt     Cancer Father     Diabetes Father     Hypertension Father       Social History     Tobacco Use    Smoking status: Former     Current packs/day: 0.00     Average packs/day: 0.7 packs/day for 22.7 years (15.0 ttl pk-yrs)     Types: Cigarettes     Start date: 2008     Quit date: 2023     Years since quittin.7     Passive exposure: Never    Smokeless tobacco: Never   Vaping Use    Vaping status: Former    Start date: 2018    Quit date: 2023    Substances: Nicotine, THC   Substance Use Topics    Alcohol use: Not Currently     Comment: Former alcoholic. Last use in     Drug use: Not Currently     Types: \"Crack\" cocaine, Marijuana     Comment: Current use medical marijuana. Not currently using crack cocaine.      E-Cigarette/Vaping    E-Cigarette Use Former User     Start Date 18     Quit Date 23       E-Cigarette/Vaping Substances    Nicotine Yes     THC Yes     CBD No     Flavoring No     Other No     Unknown No       I have reviewed and agree with the history as documented.     HPI    See mdm    Review of Systems   Unable to perform ROS: Patient nonverbal           Objective       ED Triage Vitals   Temperature Pulse Blood Pressure Respirations SpO2 Patient Position - Orthostatic VS   02/15/25 1557 02/15/25 1557 02/15/25 1557 02/15/25 1557 02/15/25 1558 02/15/25 1557   98.3 °F (36.8 °C) (!) 122 128/59 18 97 % Lying      Temp Source Heart Rate Source BP Location FiO2 (%) Pain Score    02/15/25 1557 02/15/25 1557 02/15/25 1557 02/15/25 1605 --    Temporal Monitor Left arm 60       Vitals      Date and Time Temp Pulse SpO2 Resp BP Pain Score FACES Pain Rating User   02/15/25 1930 -- 84 98 % 18 120/63 -- -- SS "   02/15/25 1900 -- 90 98 % 18 111/64 -- -- SF   02/15/25 1845 -- 95 98 % 18 94/51 -- -- DQ   02/15/25 1800 -- 123 99 % 20 127/58 -- -- ANDRES   02/15/25 1739 -- 112 99 % 18 117/59 -- -- GC   02/15/25 1730 -- 114 99 % 23 111/88 -- -- DQ   02/15/25 1715 -- 118 98 % 20 112/59 -- -- DQ   02/15/25 1645 -- 124 98 % 18 111/57 -- -- DQ   02/15/25 1558 -- -- 97 % -- -- -- -- SF   02/15/25 1557 98.3 °F (36.8 °C) 122 -- 18 128/59 -- --             Physical Exam  Vitals and nursing note reviewed.   Constitutional:       General: He is not in acute distress.     Appearance: He is well-developed. He is ill-appearing.      Comments: Chronically ill appearing   HENT:      Head: Normocephalic and atraumatic.      Right Ear: External ear normal.      Left Ear: External ear normal.   Eyes:      Extraocular Movements: Extraocular movements intact.   Cardiovascular:      Rate and Rhythm: Regular rhythm. Tachycardia present.      Pulses: Normal pulses.      Heart sounds: Normal heart sounds. No murmur heard.  Pulmonary:      Effort: Pulmonary effort is normal. No respiratory distress.      Breath sounds: Normal breath sounds. No wheezing, rhonchi or rales.      Comments: Trach dependent on ventilator   Abdominal:      Palpations: Abdomen is soft.      Tenderness: There is no abdominal tenderness.      Comments: PEG in place   Musculoskeletal:      Right lower leg: No edema.      Left lower leg: No edema.   Skin:     General: Skin is warm and dry.      Capillary Refill: Capillary refill takes less than 2 seconds.   Neurological:      Mental Status: He is alert. Mental status is at baseline.         Results Reviewed       Procedure Component Value Units Date/Time    Blood gas, venous [[id number]]  (Abnormal) Collected: 02/15/25 1738    Lab Status: Final result Specimen: Blood from Line, Venous Updated: 02/15/25 1747     pH, Rasheed 7.378     pCO2, Rasheed 47.9 mm Hg      pO2, Rasheed 53.6 mm Hg      HCO3, Rasheed 27.6 mmol/L      Base Excess, Rasheed 1.9 mmol/L       O2 Content, Rasheed 13.0 ml/dL      O2 HGB, VENOUS 85.0 %     Blood culture #2 [[id number]] Collected: 02/15/25 1738    Lab Status: In process Specimen: Blood from Arm, Left Updated: 02/15/25 1745    Procalcitonin [[id number]]  (Normal) Collected: 02/15/25 1626    Lab Status: Final result Specimen: Blood from Arm, Right Updated: 02/15/25 1707     Procalcitonin 0.23 ng/ml     Protime-INR [[id number]]  (Abnormal) Collected: 02/15/25 1634    Lab Status: Final result Specimen: Blood from Arm, Right Updated: 02/15/25 1706     Protime 17.5 seconds      INR 1.38    Narrative:      INR Therapeutic Range    Indication                                             INR Range      Atrial Fibrillation                                               2.0-3.0  Hypercoagulable State                                    2.0.2.3  Left Ventricular Asist Device                            2.0-3.0  Mechanical Heart Valve                                  -    Aortic(with afib, MI, embolism, HF, LA enlargement,    and/or coagulopathy)                                     2.0-3.0 (2.5-3.5)     Mitral                                                             2.5-3.5  Prosthetic/Bioprosthetic Heart Valve               2.0-3.0  Venous thromboembolism (VTE: VT, PE        2.0-3.0    APTT [[id number]]  (Abnormal) Collected: 02/15/25 1634    Lab Status: Final result Specimen: Blood from Arm, Right Updated: 02/15/25 1706     PTT 38 seconds     Comprehensive metabolic panel [[id number]]  (Abnormal) Collected: 02/15/25 1626    Lab Status: Final result Specimen: Blood from Arm, Right Updated: 02/15/25 1658     Sodium 135 mmol/L      Potassium 3.9 mmol/L      Chloride 99 mmol/L      CO2 27 mmol/L      ANION GAP 9 mmol/L      BUN 12 mg/dL      Creatinine 0.45 mg/dL      Glucose 74 mg/dL      Calcium 8.9 mg/dL      Corrected Calcium 9.4 mg/dL      AST 13 U/L      ALT 15 U/L      Alkaline Phosphatase 98 U/L      Total Protein 7.1 g/dL      Albumin 3.4 g/dL       Total Bilirubin 0.45 mg/dL      eGFR 144 ml/min/1.73sq m     Narrative:      National Kid[employer] Foundation guidelines for Chronic Kidney Disease (CKD):     Stage 1 with normal or high GFR (GFR > 90 mL/min/1.73 square meters)    Stage 2 Mild CKD (GFR = 60-89 mL/min/1.73 square meters)    Stage 3A Moderate CKD (GFR = 45-59 mL/min/1.73 square meters)    Stage 3B Moderate CKD (GFR = 30-44 mL/min/1.73 square meters)    Stage 4 Severe CKD (GFR = 15-29 mL/min/1.73 square meters)    Stage 5 End Stage CKD (GFR <15 mL/min/1.73 square meters)  Note: GFR calculation is accurate only with a steady state creatinine    Lactic acid [[id number]]  (Normal) Collected: 02/15/25 1626    Lab Status: Final result Specimen: Blood from Arm, Right Updated: 02/15/25 1658     LACTIC ACID 1.0 mmol/L     Narrative:      Result may be elevated if tourniquet was used during collection.    FLU/COVID Rapid Antigen (30 min. TAT) - Preferred screening test in ED [[id number]]  (Normal) Collected: 02/15/25 1626    Lab Status: Final result Specimen: Nares from Nose Updated: 02/15/25 1656     SARS COV Rapid Antigen Negative     Influenza A Rapid Antigen Negative     Influenza B Rapid Antigen Negative    Narrative:      This test has been performed using the Quidel Viviana 2 FLU+SARS Antigen test under the Emergency Use Authorization (EUA). This test has been validated by the  and verified by the performing laboratory. The Viviana uses lateral flow immunofluorescent sandwich assay to detect SARS-COV, Influenza A and Influenza B Antigen.     The Quidel Viviana 2 SARS Antigen test does not differentiate between SARS-CoV and SARS-CoV-2.     Negative results are presumptive and may be confirmed with a molecular assay, if necessary, for patient management. Negative results do not rule out SARS-CoV-2 or influenza infection and should not be used as the sole basis for treatment or patient management decisions. A negative test result may occur if the  level of antigen in a sample is below the limit of detection of this test.     Positive results are indicative of the presence of viral antigens, but do not rule out bacterial infection or co-infection with other viruses.     All test results should be used as an adjunct to clinical observations and other information available to the provider.    FOR PEDIATRIC PATIENTS - copy/paste COVID Guidelines URL to browser: [email or web address]    CBC and differential [[id number]]  (Abnormal) Collected: 02/15/25 1626    Lab Status: Final result Specimen: Blood from Arm, Right Updated: 02/15/25 1642     WBC 14.97 Thousand/uL      RBC 3.49 Million/uL      Hemoglobin 10.1 g/dL      Hematocrit 32.4 %      MCV 93 fL      MCH 28.9 pg      MCHC 31.2 g/dL      RDW 16.7 %      MPV 9.7 fL      Platelets 218 Thousands/uL      nRBC 0 /100 WBCs      Segmented % 87 %      Immature Grans % 1 %      Lymphocytes % 8 %      Monocytes % 4 %      Eosinophils Relative 0 %      Basophils Relative 0 %      Absolute Neutrophils 13.08 Thousands/µL      Absolute Immature Grans 0.09 Thousand/uL      Absolute Lymphocytes 1.17 Thousands/µL      Absolute Monocytes 0.60 Thousand/µL      Eosinophils Absolute 0.01 Thousand/µL      Basophils Absolute 0.02 Thousands/µL     Blood gas, Venous [[id number]]  (Abnormal) Collected: 02/15/25 1634    Lab Status: Final result Specimen: Blood from Arm, Right Updated: 02/15/25 1642     pH, Rasheed 7.237     pCO2, Rasheed 68.3 mm Hg      pO2, Rasheed 40.5 mm Hg      HCO3, Rasheed 28.4 mmol/L      Base Excess, Rasheed -0.2 mmol/L      O2 Content, Rasheed 10.0 ml/dL      O2 HGB, VENOUS 62.2 %     Blood culture #1 [[id number]] Collected: 02/15/25 1626    Lab Status: In process Specimen: Blood from Arm, Right Updated: 02/15/25 1639    UA w Reflex to Microscopic w Reflex to Culture [[id number]]     Lab Status: No result Specimen: Urine             XR chest 1 view portable   ED Interpretation by  "Jay Prieto MD (02/15 7907)   Trach well positioned, no acute findings compared to previous film per my interpretation           Procedures    ED Medication and Procedure Management   Prior to Admission Medications   Prescriptions Last Dose Informant Patient Reported? Taking?   Incontinence Supply Disposable (Comfort Shield Adult Diapers) MISC  Family Member No No   Sig: Use 1 each 4 (four) times a day   NEEDLE, DISP, 18 G 18G X 1\" MISC   No No   Sig: Use 2 (two) times a week   Oral Hygiene Products (Toothette Swabs/Dentifrice) SWAB  Family Member No No   Sig: Apply to the mouth or throat 3 (three) times a day   QUEtiapine Fumarate 150 MG TABS   No No   Sig: Take 150 mg by mouth daily at bedtime   Syringe/Needle, Disp, (Syringe Luer Slip) 25G X 5/8\" 1 ML MISC   No No   Sig: Use 2 (two) times a week   acetylcysteine (MUCOMYST) 200 mg/mL nebulizer solution   No No   Sig: Take 3 mL (600 mg total) by nebulization every 6 (six) hours   apixaban (ELIQUIS) 5 mg  Family Member No No   Si tablet (5 mg total) by Per PEG Tube route 2 (two) times a day   ipratropium-albuterol (DUO-NEB) 0.5-2.5 mg/3 mL nebulizer solution   No No   Sig: Take 3 mL by nebulization every 6 (six) hours      Facility-Administered Medications: None     Patient's Medications   Discharge Prescriptions    No medications on file     No discharge procedures on file.  ED SEPSIS DOCUMENTATION   Time reflects when diagnosis was documented in both MDM as applicable and the Disposition within this note       Time User Action Codes Description Comment    2/15/2025  5:49 PM Jay Prieto [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     2/15/2025  5:49 PM Jay Prieto [Z99.11] Ventilator dependent (HCC)     2/15/2025  5:49 PM Jay Prieto [Z93.1] S/P percutaneous endoscopic gastrostomy (PEG) tube placement (HCC)     2/15/2025  5:49 PM Jay Prieto [Z86.711] History of pulmonary embolus (PE)     2/15/2025  5:50 PM Conner" Jay Add [Z71.1] Feared complaint without diagnosis     2/15/2025  5:50 PM Jay Prieto 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     2/15/2025  5:50 PM Jay Prieto Modify [Z71.1] Feared complaint without diagnosis     2/15/2025  9:33 PM Jay Prieto Modify [Z99.11] Ventilator dependent (HCC)     2/15/2025  9:33 PM Jay Prieto Modify [Z71.1] Feared complaint without diagnosis     2/15/2025  9:33 PM Jay Prieto Remove [Z71.1] Feared complaint without diagnosis     2/15/2025  9:33 PM Jay Prieto Add [J96.21,  J96.22] Acute on chronic respiratory failure with hypoxia and hypercapnia (HCC)     2/15/2025  9:33 PM Jay Prieto Modify [Z99.11] Ventilator dependent (HCC)     2/15/2025  9:33 PM Jay Prieto Modify [J96.21,  J96.22] Acute on chronic respiratory failure with hypoxia and hypercapnia (HCC)                  Jay Prieto MD  02/15/25 2042       Jay Prieto MD  02/15/25 2146

## 2025-02-15 NOTE — ASSESSMENT & PLAN NOTE
History of agitation  Outpatient regimen consist of Seroquel 150 mg at bedtime  Family reports increased agitation past week with patient's of removing PEG tube x 2  SLE expressed concerns of suicide as patient continually tried to self remove himself from ventilator with increasing agitation and concerns for self-harm  Psych consult-increased seroquel to 200mg @ HS  Has been receiving PRN Ativan for periods of agitation   1:1 virtual monitoring  ED crisis evaluated last evening but patient very somnolent after receiving IV Ativan, plan to come back for evaluation today   Patient awake and alert this morning and able to nod no when asked if he is having thoughts of harming himself. Also denies trying to previously harm himself and nodded yes when asked if he was pulling at trach and PEG because he felt agitated.

## 2025-02-15 NOTE — NURSING NOTE
Pt transferred back to home. Paperwork given to the transport team. Pt IV removed intact and tolerated well.

## 2025-02-15 NOTE — ASSESSMENT & PLAN NOTE
Intermittent agitation, will pull medical equipment, spit and swing at staff, and mouth curse words   Continue B/L soft wrist restraints for safety   Continue seroquel 200mg qHS with PRN IV Ativan for agitation, can also use 25mg PRN seroquel for agitation per recent psych recs

## 2025-02-15 NOTE — ASSESSMENT & PLAN NOTE
SIRS with tachycardia and leukocytosis (14)  No obvious source of infection and slightly improved on labs this afternoon  2/15: CXR-low lung volumes/bibasilar atelectasis, some scattered B/L patchy opacifications. Appears similar to prior studies   Procal negative x 4  BC x 2 drawn in ED   Flu/COVID negative   Monitor off abx   Episode of hypotension en route which self resolved and then several hours later had episode of hypotension with SBP 80s in ED and given 500mL Isolyte with improvement. May have been related to seroquel administration

## 2025-02-15 NOTE — DISCHARGE SUMMARY
"Discharge Summary - Critical Care/ICU   Name: Hemanth Swanson 39 y.o. male I MRN: [id number]  Unit/Bed#: ICU 04 I Date of Admission: 2/14/2025   Date of Service: 2/15/2025 I Hospital Day: 1    Discharge Summary - [facility] Critical Care    Patient Information: Hemanth Swanson 39 y.o. male MRN: [id number]  Unit/Bed#: ICU 04 Encounter: [id number]    Discharging Physician / Practitioner: Leny Cook PA-C  PCP: Ela Douglas MD    Admission Date: 2/14/2025  Discharge Date: 02/15/25    Reason for Admission: agitation, malfunction of home ventilator    Discharge Diagnoses:   Principal Problem:    Acute on chronic respiratory failure with hypoxia and hypercapnia (HCC)  Active Problems:    Agitation    Mood disorder (HCC)    History of pulmonary embolus (PE)    Mixed axonal-demyelinating polyneuropathy    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HCC)    S/P percutaneous endoscopic gastrostomy (PEG) tube placement (HCC)  Resolved Problems:    Toxic metabolic encephalopathy      Consultations During Hospital Stay:  psychiatry          Hospital Course:     \"Hemanth Swanson is a 39 y.o. who presents with agitation and self dislodgment of PEG tube.  Patient has a past medical history of mixed axonal demyelinating polyneuropathy, trach/vent dependent, PEG tube in place, and history of PE on Eliquis.      Upon arrival to [facility], family expressed concerns of increased agitation and concerns of sepsis.  PEG tube was replaced.  Lab work was largely unremarkable except for VBG.  Patient was found to be hypercapnic on home ventilator.  He was switched over to hospital ventilator on same home settings with improvement in CO2.     Patient also became increasingly agitated at [facility] with self removing himself from the ventilator.  He required Zyprexa and Ativan and placement of restraints.     Patient transferred to [facility] for ventilator management. \"    There was concern from [facility] team for self-harm ideation.     Admitted to ICU and evaluated by " psychiatry and crisis team. Cleared by crisis team for return to home. Likely disconnection from ventilator is related to intermittent agitated behaviors patient displays at times.  Recommendations for uptitration of Seroquel to assist with agitation. No obvious source of infection, patient's hypercarbia improved on home settings. Concern that there was an issue with ventilator at home - sister was able to contact respiratory company and they will use his transport ventilator until a replacement can be provided. Patient continued with intermittent agitation although improved since arrival. Recommend following up with psychiatry as outpatient.     Condition at Discharge: stable     Discharge Day Visit / Exam:     * Please refer to separate progress for these details *    Discharge instructions/Information to patient and family:   See after visit summary for information provided to patient and family.      Provisions for Follow-Up Care:  See after visit summary for information related to follow-up care and any pertinent home health orders.      Disposition: Home    Planned Readmission: none    Discharge Statement:  I spent 30 minutes discharging the patient. This time was spent on the day of discharge. I had direct contact with the patient on the day of discharge. Greater than 50% of the total time was spent examining patient, answering all patient questions, arranging and discussing plan of care with patient as well as directly providing post-discharge instructions.  Additional time then spent on discharge activities.    Discharge Medications:  See after visit summary for reconciled discharge medications provided to patient and family.    Discharge Diet:  tubefeeding  Activity restrictions: none    Leny Cook PA-C  2/15/2025  12:25 PM

## 2025-02-15 NOTE — PROGRESS NOTES
"Progress Note - Critical Care/ICU   Name: Hemanth Swanson 39 y.o. male I MRN: [id number]  Unit/Bed#: ICU 04 I Date of Admission: 2/14/2025   Date of Service: 2/15/2025 I Hospital Day: 1      Assessment & Plan  Acute on chronic respiratory failure with hypoxia and hypercapnia (HCC)  Status post trach for neuromuscular disease  Vent dependent at baseline  VBG obtained at [facility] revealing 7.29/77/64/36  Repeat VBG was obtained without any improvement in hypercapnia  Patient was transitioned from home ventilator to hospital ventilator with reported \" leak\" in home ventilator  Improvement in hypercapnia on same home settings with hospital equipment    Plan:  Continue vent support  18/500/8/40%   New home equipment delivery has been arranged  VAP bundle; chlorhexidine, PPI, HOB greater than 30 degrees   Pulmonary toilet; PRN ETT suctioning, duonebs and mucomyst nebs q6h, chest PT TID  Agitation  History of agitation  Outpatient regimen consist of Seroquel 150 mg at bedtime  Family reports increased agitation past week with patient's of removing PEG tube x 2  [facility] expressed concerns of suicide as patient continually tried to self remove himself from ventilator with increasing agitation and concerns for self-harm  Psych consult-increased seroquel to 200mg @ HS  Has been receiving PRN Ativan for periods of agitation   1:1 virtual monitoring  ED crisis evaluated last evening but patient very somnolent after receiving IV Ativan, plan to come back for evaluation today   Patient awake and alert this morning and able to nod no when asked if he is having thoughts of harming himself. Also denies trying to previously harm himself and nodded yes when asked if he was pulling at trach and PEG because he felt agitated.   S/P percutaneous endoscopic gastrostomy (PEG) tube placement (HCC)  Patient self removed on 2/9 and PEG tube was replaced  Patient again self removed on 2/13 and PEG tube was replaced    History of pulmonary embolus " (PE)  Continue Eliquis  Mixed axonal-demyelinating polyneuropathy  Follows outpatient with neurology  Supportive care  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HCC)  S/p resection and chemo  Port remains in place    Toxic metabolic encephalopathy (Resolved: 2/15/2025)  Suspect secondary to sedating agents   More sedated overnight after receiving PRN ativan in the evening and 200 seroquel @ HS   Delirium precautions; regulate sleep/wake cycle, environmental controls, daily CAM ICU   Neuro checks q4h   Mood disorder (HCC)  Seroquel increased to 200mg @ HS   PRN Seroquel 25mg BID for agitation  Psych consult   Disposition: Critical care    ICU Core Measures     Vented Patient  VAP Bundle  VAP bundle ordered     A: Assess, Prevent, and Manage Pain Has pain been assessed? Yes  Need for changes to pain regimen? No   B: Both Spontaneous Awakening Trials (SATs) and Spontaneous Breathing Trials (SBTs) Plan to perform spontaneous awakening trial today? Chronic vent   Plan to perform spontaneous breathing trial today? Chronic vent   Obvious barriers to extubation? NA   C: Choice of Sedation RASS Goal: N/A patient not on sedation  Need for changes to sedation or analgesia regimen? NA   D: Delirium CAM-ICU: Unable to perform secondary to Acute cognitive dysfunction   E: Early Mobility  Plan for early mobility? No   F: Family Engagement Plan for family engagement today? Yes       Review of Invasive Devices:      Central access plan:  unaccessed port in place       Prophylaxis:  VTE VTE covered by:  apixaban, Per PEG Tube, 5 mg at 02/14/25 1734       Stress Ulcer  not ordered         24 Hour Events : ED crisis worker evaluated last evening but patient very somnolent after receiving IV Ativan, crisis worker planning to come back for evaluation today. Seen by psych yesterday and seroquel increased to 200mg @ HS. Patient awake and alert this morning and able to nod no when asked if he is having thoughts of harming himself. Also denies trying to  previously harm himself and nodded yes when asked if he was pulling at trach and PEG because he felt agitated.      Subjective   Review of Systems: Review of Systems not obtainable due to Altered mental status    Objective :                   Vitals I/O      Most Recent Min/Max in 24hrs   Temp 98.2 °F (36.8 °C) Temp  Min: 96.5 °F (35.8 °C)  Max: 98.4 °F (36.9 °C)   Pulse 96 Pulse  Min: 65  Max: 113   Resp 18 Resp  Min: 18  Max: 26   /67 BP  Min: 87/54  Max: 163/73   O2 Sat 98 % SpO2  Min: 95 %  Max: 99 %      Intake/Output Summary (Last 24 hours) at 2/15/2025 0521  Last data filed at 2/14/2025 1800  Gross per 24 hour   Intake 75 ml   Output 500 ml   Net -425 ml       Diet Enteral/Parenteral; Tube Feeding No Oral Diet; Jevity 1.5; Continuous; 65; 200; Water; Every 4 hours    Invasive Monitoring   N/A         Physical Exam   Physical Exam  Eyes:      Pupils: Pupils are equal, round, and reactive to light.   Skin:     General: Skin is warm and dry.      Capillary Refill: Capillary refill takes less than 2 seconds.   HENT:      Head: Normocephalic and atraumatic.   Neck:      Trachea: Tracheostomy present.   Cardiovascular:      Rate and Rhythm: Normal rate and regular rhythm.      Pulses: Normal pulses.           Radial pulses are 2+ on the right side and 2+ on the left side.        Dorsalis pedis pulses are 2+ on the right side and 2+ on the left side.      Heart sounds: Normal heart sounds, S1 normal and S2 normal.   Musculoskeletal:      Comments: HINES weakly    Abdominal: General: Bowel sounds are normal.      Palpations: Abdomen is soft.      Comments: PEG tube site C/D/I   Constitutional:       General: He is awake.      Interventions: He is restrained.   Pulmonary:      Effort: Pulmonary effort is normal.      Comments: R sided breath sounds clear, L sided breath sounds course and diminished   Psychiatric:         Mood and Affect: Mood and affect normal.   Neurological:      Mental Status: He is alert.       GCS: GCS eye subscore is 4. GCS verbal subscore is 1. GCS motor subscore is 6.          Diagnostic Studies        Imaging:   No new imaging      Medications:  Scheduled PRN   acetylcysteine, 3 mL, Q6H  apixaban, 5 mg, BID  chlorhexidine, 15 mL, Q12H ISAEL  ipratropium-albuterol, 3 mL, Q6H  QUEtiapine, 200 mg, HS      acetaminophen, 650 mg, Q6H PRN  LORazepam, 1 mg, Q4H PRN  QUEtiapine, 25 mg, BID PRN       Continuous          Labs:   CBC    Recent Labs     02/13/25 2103 02/14/25 0458   WBC 7.91 4.70   HGB 11.5* 9.8*   HCT 38.3 31.0*    207     BMP    Recent Labs     02/13/25 2103 02/14/25 0458   SODIUM 139 139   K 4.6 2.8*   CL 94* 96   CO2 41* 27   AGAP 4 16*   BUN 16 15   CREATININE 0.41* 0.32*   CALCIUM 10.2 9.4       Coags    No recent results     Additional Electrolytes  Recent Labs     02/14/25 0458   MG 1.9   PHOS 1.8*          Blood Gas    No recent results  Recent Labs     02/14/25 0458   PHVEN 7.576*   JUI0SOH 31.8*   PO2VEN 61.6*   EPD3XPN 28.9   BEVEN 6.9   N8ZUBLT 93.8*    LFTs  Recent Labs     02/13/25 2103   ALT 21   AST 14   ALKPHOS 100   ALB 4.3   TBILI 0.38       Infectious  Recent Labs     02/13/25 2103 02/14/25 0458   PROCALCITONI <0.05 <0.05     Glucose  Recent Labs     02/13/25 2103 02/14/25 0458   GLUC 85 94

## 2025-02-16 LAB
ANION GAP SERPL CALCULATED.3IONS-SCNC: 13 MMOL/L (ref 4–13)
ARTERIAL PATENCY WRIST A: YES
ATRIAL RATE: 121 BPM
BASE EX.OXY STD BLDV CALC-SCNC: 84.8 % (ref 60–80)
BASE EXCESS BLDV CALC-SCNC: 5.4 MMOL/L
BASOPHILS # BLD AUTO: 0.02 THOUSANDS/ΜL (ref 0–0.1)
BASOPHILS NFR BLD AUTO: 0 % (ref 0–1)
BODY TEMPERATURE: 96.6 DEGREES FEHRENHEIT
BUN SERPL-MCNC: 13 MG/DL (ref 5–25)
CALCIUM SERPL-MCNC: 8.7 MG/DL (ref 8.4–10.2)
CHLORIDE SERPL-SCNC: 99 MMOL/L (ref 96–108)
CO2 SERPL-SCNC: 23 MMOL/L (ref 21–32)
CREAT SERPL-MCNC: 0.31 MG/DL (ref 0.6–1.3)
EOSINOPHIL # BLD AUTO: 0.05 THOUSAND/ΜL (ref 0–0.61)
EOSINOPHIL NFR BLD AUTO: 1 % (ref 0–6)
ERYTHROCYTE [DISTWIDTH] IN BLOOD BY AUTOMATED COUNT: 16.4 % (ref 11.6–15.1)
GFR SERPL CREATININE-BSD FRML MDRD: 168 ML/MIN/1.73SQ M
GLUCOSE SERPL-MCNC: 92 MG/DL (ref 65–140)
HCO3 BLDV-SCNC: 28.6 MMOL/L (ref 24–30)
HCT VFR BLD AUTO: 28.5 % (ref 36.5–49.3)
HGB BLD-MCNC: 9.3 G/DL (ref 12–17)
HOROWITZ INDEX BLDA+IHG-RTO: 40 MM[HG]
IMM GRANULOCYTES # BLD AUTO: 0.07 THOUSAND/UL (ref 0–0.2)
IMM GRANULOCYTES NFR BLD AUTO: 1 % (ref 0–2)
LYMPHOCYTES # BLD AUTO: 1.08 THOUSANDS/ΜL (ref 0.6–4.47)
LYMPHOCYTES NFR BLD AUTO: 12 % (ref 14–44)
MAGNESIUM SERPL-MCNC: 2.1 MG/DL (ref 1.9–2.7)
MCH RBC QN AUTO: 29.4 PG (ref 26.8–34.3)
MCHC RBC AUTO-ENTMCNC: 32.6 G/DL (ref 31.4–37.4)
MCV RBC AUTO: 90 FL (ref 82–98)
MONOCYTES # BLD AUTO: 0.44 THOUSAND/ΜL (ref 0.17–1.22)
MONOCYTES NFR BLD AUTO: 5 % (ref 4–12)
NEUTROPHILS # BLD AUTO: 7.62 THOUSANDS/ΜL (ref 1.85–7.62)
NEUTS SEG NFR BLD AUTO: 81 % (ref 43–75)
NRBC BLD AUTO-RTO: 0 /100 WBCS
O2 CT BLDV-SCNC: 12.2 ML/DL
P AXIS: 13 DEGREES
PCO2 BLDV: 36.6 MM HG (ref 42–50)
PEEP RESPIRATORY: 6 CM[H2O]
PH BLDV: 7.51 [PH] (ref 7.3–7.4)
PHOSPHATE SERPL-MCNC: 3.3 MG/DL (ref 2.7–4.5)
PLATELET # BLD AUTO: 191 THOUSANDS/UL (ref 149–390)
PMV BLD AUTO: 10.3 FL (ref 8.9–12.7)
PO2 BLDV: 47.7 MM HG (ref 35–45)
POTASSIUM SERPL-SCNC: 3.5 MMOL/L (ref 3.5–5.3)
PR INTERVAL: 160 MS
PROCALCITONIN SERPL-MCNC: 0.27 NG/ML
QRS AXIS: 63 DEGREES
QRSD INTERVAL: 82 MS
QT INTERVAL: 322 MS
QTC INTERVAL: 457 MS
RBC # BLD AUTO: 3.16 MILLION/UL (ref 3.88–5.62)
SODIUM SERPL-SCNC: 135 MMOL/L (ref 135–147)
T WAVE AXIS: -13 DEGREES
VENT AC: 18
VENT- AC: AC
VENTRICULAR RATE: 121 BPM
VT SETTING VENT: 500 ML
WBC # BLD AUTO: 9.28 THOUSAND/UL (ref 4.31–10.16)

## 2025-02-16 PROCEDURE — 94640 AIRWAY INHALATION TREATMENT: CPT

## 2025-02-16 PROCEDURE — 94003 VENT MGMT INPAT SUBQ DAY: CPT

## 2025-02-16 PROCEDURE — 80048 BASIC METABOLIC PNL TOTAL CA: CPT | Performed by: NURSE PRACTITIONER

## 2025-02-16 PROCEDURE — 84145 PROCALCITONIN (PCT): CPT | Performed by: NURSE PRACTITIONER

## 2025-02-16 PROCEDURE — 93010 ELECTROCARDIOGRAM REPORT: CPT | Performed by: INTERNAL MEDICINE

## 2025-02-16 PROCEDURE — 82805 BLOOD GASES W/O2 SATURATION: CPT | Performed by: NURSE PRACTITIONER

## 2025-02-16 PROCEDURE — 94760 N-INVAS EAR/PLS OXIMETRY 1: CPT

## 2025-02-16 PROCEDURE — 83735 ASSAY OF MAGNESIUM: CPT | Performed by: NURSE PRACTITIONER

## 2025-02-16 PROCEDURE — 84100 ASSAY OF PHOSPHORUS: CPT | Performed by: NURSE PRACTITIONER

## 2025-02-16 PROCEDURE — 85025 COMPLETE CBC W/AUTO DIFF WBC: CPT | Performed by: NURSE PRACTITIONER

## 2025-02-16 PROCEDURE — 94669 MECHANICAL CHEST WALL OSCILL: CPT

## 2025-02-16 PROCEDURE — 94150 VITAL CAPACITY TEST: CPT

## 2025-02-16 PROCEDURE — 99232 SBSQ HOSP IP/OBS MODERATE 35: CPT | Performed by: INTERNAL MEDICINE

## 2025-02-16 PROCEDURE — 94668 MNPJ CHEST WALL SBSQ: CPT

## 2025-02-16 RX ORDER — POTASSIUM CHLORIDE 20MEQ/15ML
40 LIQUID (ML) ORAL ONCE
Status: COMPLETED | OUTPATIENT
Start: 2025-02-16 | End: 2025-02-16

## 2025-02-16 RX ORDER — SODIUM CHLORIDE FOR INHALATION 3 %
4 VIAL, NEBULIZER (ML) INHALATION
Status: COMPLETED | OUTPATIENT
Start: 2025-02-16 | End: 2025-02-18

## 2025-02-16 RX ORDER — SODIUM CHLORIDE FOR INHALATION 3 %
8 VIAL, NEBULIZER (ML) INHALATION
Status: DISCONTINUED | OUTPATIENT
Start: 2025-02-16 | End: 2025-02-16

## 2025-02-16 RX ADMIN — SODIUM CHLORIDE SOLN NEBU 3% 4 ML: 3 NEBU SOLN at 07:46

## 2025-02-16 RX ADMIN — CHLORHEXIDINE GLUCONATE 15 ML: 1.2 SOLUTION ORAL at 08:49

## 2025-02-16 RX ADMIN — SODIUM CHLORIDE SOLN NEBU 3% 4 ML: 3 NEBU SOLN at 19:31

## 2025-02-16 RX ADMIN — LORAZEPAM 1 MG: 2 INJECTION INTRAMUSCULAR; INTRAVENOUS at 19:02

## 2025-02-16 RX ADMIN — IPRATROPIUM BROMIDE AND ALBUTEROL SULFATE 3 ML: .5; 3 SOLUTION RESPIRATORY (INHALATION) at 07:46

## 2025-02-16 RX ADMIN — CHLORHEXIDINE GLUCONATE 15 ML: 1.2 SOLUTION ORAL at 21:05

## 2025-02-16 RX ADMIN — IPRATROPIUM BROMIDE AND ALBUTEROL SULFATE 3 ML: .5; 3 SOLUTION RESPIRATORY (INHALATION) at 19:31

## 2025-02-16 RX ADMIN — PANTOPRAZOLE SODIUM 40 MG: 40 INJECTION, POWDER, FOR SOLUTION INTRAVENOUS at 08:49

## 2025-02-16 RX ADMIN — SODIUM CHLORIDE SOLN NEBU 3% 8 ML: 3 NEBU SOLN at 13:53

## 2025-02-16 RX ADMIN — POTASSIUM CHLORIDE 40 MEQ: 20 SOLUTION ORAL at 08:49

## 2025-02-16 RX ADMIN — IPRATROPIUM BROMIDE AND ALBUTEROL SULFATE 3 ML: .5; 3 SOLUTION RESPIRATORY (INHALATION) at 01:37

## 2025-02-16 RX ADMIN — QUETIAPINE FUMARATE 200 MG: 100 TABLET ORAL at 21:05

## 2025-02-16 RX ADMIN — APIXABAN 5 MG: 5 TABLET, FILM COATED ORAL at 17:47

## 2025-02-16 RX ADMIN — APIXABAN 5 MG: 5 TABLET, FILM COATED ORAL at 08:49

## 2025-02-16 RX ADMIN — SODIUM CHLORIDE SOLN NEBU 3% 4 ML: 3 NEBU SOLN at 01:37

## 2025-02-16 RX ADMIN — IPRATROPIUM BROMIDE AND ALBUTEROL SULFATE 3 ML: .5; 3 SOLUTION RESPIRATORY (INHALATION) at 13:54

## 2025-02-16 NOTE — PLAN OF CARE
Problem: SAFETY,RESTRAINT: NV/NON-SELF DESTRUCTIVE BEHAVIOR  Goal: Remains free of harm/injury (restraint for non violent/non self-detsructive behavior)  Description: INTERVENTIONS:  - Instruct patient/family regarding restraint use   - Assess and monitor physiologic and psychological status   - Provide interventions and comfort measures to meet assessed patient needs   - Identify and implement measures to help patient regain control  - Assess readiness for release of restraint   Outcome: Progressing     Problem: SAFETY,RESTRAINT: NV/NON-SELF DESTRUCTIVE BEHAVIOR  Goal: Returns to optimal restraint-free functioning  Description: INTERVENTIONS:  - Assess the patient's behavior and symptoms that indicate continued need for restraint  - Identify and implement measures to help patient regain control  - Assess readiness for release of restraint   Outcome: Progressing     Problem: RESPIRATORY - ADULT  Goal: Achieves optimal ventilation and oxygenation  Description: INTERVENTIONS:  - Assess for changes in respiratory status  - Assess for changes in mentation and behavior  - Position to facilitate oxygenation and minimize respiratory effort  - Oxygen administered by appropriate delivery if ordered  - Initiate smoking cessation education as indicated  - Encourage broncho-pulmonary hygiene including cough, deep breathe, Incentive Spirometry  - Assess the need for suctioning and aspirate as needed  - Assess and instruct to report SOB or any respiratory difficulty  - Respiratory Therapy support as indicated  Outcome: Progressing     Problem: PAIN - ADULT  Goal: Verbalizes/displays adequate comfort level or baseline comfort level  Description: Interventions:  - Encourage patient to monitor pain and request assistance  - Assess pain using appropriate pain scale  - Administer analgesics based on type and severity of pain and evaluate response  - Implement non-pharmacological measures as appropriate and evaluate response  -  Consider cultural and social influences on pain and pain management  - Notify physician/advanced practitioner if interventions unsuccessful or patient reports new pain  Outcome: Progressing     Problem: DISCHARGE PLANNING  Goal: Discharge to home or other facility with appropriate resources  Description: INTERVENTIONS:  - Identify barriers to discharge w/patient and caregiver  - Arrange for needed discharge resources and transportation as appropriate  - Identify discharge learning needs (meds, wound care, etc.)  - Arrange for interpretive services to assist at discharge as needed  - Refer to Case Management Department for coordinating discharge planning if the patient needs post-hospital services based on physician/advanced practitioner order or complex needs related to functional status, cognitive ability, or social support system  Outcome: Progressing     Problem: NEUROSENSORY - ADULT  Goal: Achieves stable or improved neurological status  Description: INTERVENTIONS  - Monitor and report changes in neurological status  - Monitor vital signs such as temperature, blood pressure, glucose, and any other labs ordered   - Initiate measures to prevent increased intracranial pressure  - Monitor for seizure activity and implement precautions if appropriate      Outcome: Progressing     Problem: CARDIOVASCULAR - ADULT  Goal: Maintains optimal cardiac output and hemodynamic stability  Description: INTERVENTIONS:  - Monitor I/O, vital signs and rhythm  - Monitor for S/S and trends of decreased cardiac output  - Administer and titrate ordered vasoactive medications to optimize hemodynamic stability  - Assess quality of pulses, skin color and temperature  - Assess for signs of decreased coronary artery perfusion  - Instruct patient to report change in severity of symptoms  Outcome: Progressing     Problem: GASTROINTESTINAL - ADULT  Goal: Maintains adequate nutritional intake  Description: INTERVENTIONS:  - Monitor percentage of  each meal consumed  - Identify factors contributing to decreased intake, treat as appropriate  - Assist with meals as needed  - Monitor I&O, weight, and lab values if indicated  - Obtain nutrition services referral as needed  Outcome: Progressing     Problem: Prexisting or High Potential for Compromised Skin Integrity  Goal: Skin integrity is maintained or improved  Description: INTERVENTIONS:  - Identify patients at risk for skin breakdown  - Assess and monitor skin integrity  - Assess and monitor nutrition and hydration status  - Monitor labs   - Assess for incontinence   - Turn and reposition patient  - Assist with mobility/ambulation  - Relieve pressure over bony prominences  - Avoid friction and shearing  - Provide appropriate hygiene as needed including keeping skin clean and dry  - Evaluate need for skin moisturizer/barrier cream  - Collaborate with interdisciplinary team   - Patient/family teaching  - Consider wound care consult   Outcome: Progressing     Problem: SKIN/TISSUE INTEGRITY - ADULT  Goal: Incision(s), wounds(s) or drain site(s) healing without S/S of infection  Description: INTERVENTIONS  - Assess and document dressing, incision, wound bed, drain sites and surrounding tissue  - Provide patient and family education  - Perform skin care/dressing changes every 2 hours  Problem: SAFETY,RESTRAINT: NV/NON-SELF DESTRUCTIVE BEHAVIOR  Goal: Remains free of harm/injury (restraint for non violent/non self-detsructive behavior)  Description: INTERVENTIONS:  - Instruct patient/family regarding restraint use   - Assess and monitor physiologic and psychological status   - Provide interventions and comfort measures to meet assessed patient needs   - Identify and implement measures to help patient regain control  - Assess readiness for release of restraint   2/15/2025 2325 by Maria M Goldman RN  Outcome: Progressing  2/15/2025 2324 by Maria M Goldman RN  Outcome: Progressing     Outcome: Progressing

## 2025-02-16 NOTE — PLAN OF CARE
Problem: RESPIRATORY - ADULT  Goal: Achieves optimal ventilation and oxygenation  Description: INTERVENTIONS:  - Assess for changes in respiratory status  - Assess for changes in mentation and behavior  - Position to facilitate oxygenation and minimize respiratory effort  - Oxygen administered by appropriate delivery if ordered  - Initiate smoking cessation education as indicated  - Encourage broncho-pulmonary hygiene including cough, deep breathe, Incentive Spirometry  - Assess the need for suctioning and aspirate as needed  - Assess and instruct to report SOB or any respiratory difficulty  - Respiratory Therapy support as indicated  Outcome: Progressing     Problem: NEUROSENSORY - ADULT  Goal: Achieves stable or improved neurological status  Description: INTERVENTIONS  - Monitor and report changes in neurological status  - Monitor vital signs such as temperature, blood pressure, glucose, and any other labs ordered   - Initiate measures to prevent increased intracranial pressure  - Monitor for seizure activity and implement precautions if appropriate      Outcome: Progressing     Problem: CARDIOVASCULAR - ADULT  Goal: Maintains optimal cardiac output and hemodynamic stability  Description: INTERVENTIONS:  - Monitor I/O, vital signs and rhythm  - Monitor for S/S and trends of decreased cardiac output  - Administer and titrate ordered vasoactive medications to optimize hemodynamic stability  - Assess quality of pulses, skin color and temperature  - Assess for signs of decreased coronary artery perfusion  - Instruct patient to report change in severity of symptoms  Outcome: Progressing  Goal: Absence of cardiac dysrhythmias or at baseline rhythm  Description: INTERVENTIONS:  - Continuous cardiac monitoring, vital signs, obtain 12 lead EKG if ordered  - Administer antiarrhythmic and heart rate control medications as ordered  - Monitor electrolytes and administer replacement therapy as ordered  Outcome: Progressing      Problem: GASTROINTESTINAL - ADULT  Goal: Maintains adequate nutritional intake  Description: INTERVENTIONS:  - Monitor percentage of each meal consumed  - Identify factors contributing to decreased intake, treat as appropriate  - Assist with meals as needed  - Monitor I&O, weight, and lab values if indicated  - Obtain nutrition services referral as needed  Outcome: Progressing     Problem: SKIN/TISSUE INTEGRITY - ADULT  Goal: Incision(s), wounds(s) or drain site(s) healing without S/S of infection  Description: INTERVENTIONS  - Assess and document dressing, incision, wound bed, drain sites and surrounding tissue  - Provide patient and family education  - Perform skin care/dressing changes every shift  Outcome: Progressing     Problem: PAIN - ADULT  Goal: Verbalizes/displays adequate comfort level or baseline comfort level  Description: Interventions:  - Encourage patient to monitor pain and request assistance  - Assess pain using appropriate pain scale  - Administer analgesics based on type and severity of pain and evaluate response  - Implement non-pharmacological measures as appropriate and evaluate response  - Consider cultural and social influences on pain and pain management  - Notify physician/advanced practitioner if interventions unsuccessful or patient reports new pain  Outcome: Progressing     Problem: DISCHARGE PLANNING  Goal: Discharge to home or other facility with appropriate resources  Description: INTERVENTIONS:  - Identify barriers to discharge w/patient and caregiver  - Arrange for needed discharge resources and transportation as appropriate  - Identify discharge learning needs (meds, wound care, etc.)  - Arrange for interpretive services to assist at discharge as needed  - Refer to Case Management Department for coordinating discharge planning if the patient needs post-hospital services based on physician/advanced practitioner order or complex needs related to functional status, cognitive  ability, or social support system  Outcome: Progressing     Problem: Prexisting or High Potential for Compromised Skin Integrity  Goal: Skin integrity is maintained or improved  Description: INTERVENTIONS:  - Identify patients at risk for skin breakdown  - Assess and monitor skin integrity  - Assess and monitor nutrition and hydration status  - Monitor labs   - Assess for incontinence   - Turn and reposition patient  - Assist with mobility/ambulation  - Relieve pressure over bony prominences  - Avoid friction and shearing  - Provide appropriate hygiene as needed including keeping skin clean and dry  - Evaluate need for skin moisturizer/barrier cream  - Collaborate with interdisciplinary team   - Patient/family teaching  - Consider wound care consult   Outcome: Progressing     Problem: SAFETY,RESTRAINT: NV/NON-SELF DESTRUCTIVE BEHAVIOR  Goal: Remains free of harm/injury (restraint for non violent/non self-detsructive behavior)  Description: INTERVENTIONS:  - Instruct patient/family regarding restraint use   - Assess and monitor physiologic and psychological status   - Provide interventions and comfort measures to meet assessed patient needs   - Identify and implement measures to help patient regain control  - Assess readiness for release of restraint   Outcome: Progressing  Goal: Returns to optimal restraint-free functioning  Description: INTERVENTIONS:  - Assess the patient's behavior and symptoms that indicate continued need for restraint  - Identify and implement measures to help patient regain control  - Assess readiness for release of restraint   Outcome: Progressing

## 2025-02-16 NOTE — UTILIZATION REVIEW
Initial Clinical Review    Admission: Date/Time/Statement:   Admission Orders (From admission, onward)       Ordered        02/15/25 2134  Place in Observation  Once                          Orders Placed This Encounter   Procedures    Place in Observation     Standing Status:   Standing     Number of Occurrences:   1     Level of Care:   Critical Care [15]     ED Arrival Information       Expected   -    Arrival   2/15/2025 15:52    Acuity   Urgent              Means of arrival   Ambulance    Escorted by   [facility])    Service   Critical Care/ICU    Admission type   Emergency              Arrival complaint   -             Chief Complaint   Patient presents with    Hypotension     Pt arrives with [facility] medic, he was transported home after being d.c from [place] ICU. Reported that pt became hypotensive and diaphoresis.  Pt has Vent. Non verbal        Initial Presentation:   40 y/o M with demyelinating polyneuropathy, peg/trach dependent, recent ICU admission for ventilator management returns to ED quickly after ICU discharge this morning for evaluation of hypotention. Per EMS who was transporting patient home, patient had back to back low blood pressure readings first 90s systolic then 80s. Received 250ml bolus per them and BP now 120s systolic. Pt then with episode desaturated to 50%. Disconnected from vent and bagged through trach up to 100%. RT put back on vent now with no further issues, unclear issue, seemed like the vent was not delivering breaths. Patient does have hx of this with intentional breath holding when he becomes agitated, also may be d/t mucus plugging to some degree. Hx  mixed axonal demyelinating polyneuropathy, trach/vent dependent, PEG tube in place, and history of PE on Eliquis. Presents as above. Admission Initial VBG 7.37/68.3/40.5/28.4/-0.2. Repeat VBG normalized 7.37/47.9/53.6/27.6/1.9.   Placed in observation status for chronic respiratory failure with hypoxia & hypercapnia. Plan:  Pulmonary toilet; continue home duo-nebs q6h, add 3% nebs q6h and chest PT TID. Maintain O2 sat 92% or above. VAP preventions; chlorhexidine, PPI, HOB greater than 30 degrees    2/16/25- observation:  Pt calm and cooperative since arrival to ICU. No desaturation or hypotensive episodes.   Neuro: lethargic and sleepy at present; monitor neurostatus: psychiatry consultation appreciated; increased seroquel   Resp: decreased breath sound at bases. On vent support: hypercapnia has resolved:lower Vt to 420 as pt noticed to be in respiratory alkalosis.     2/17/25- observation:  Episodes of desaturation associated with mucus plugging yesterday. Also with intermittent episodes of intense diaphoresis. Pulm - Coarse bilateral breath sounds. Symmetric chest excursion.     ED Treatment-Medication Administration from 02/15/2025 1552 to 02/15/2025 2236         Date/Time Order Dose Route Action     02/15/2025 1640 LORazepam (ATIVAN) injection 1 mg 1 mg Intravenous Given     02/15/2025 2111 QUEtiapine (SEROquel) tablet 150 mg 150 mg Per PEG Tube Given     02/15/2025 2225 ipratropium-albuterol (DUO-NEB) 0.5-2.5 mg/3 mL inhalation solution 3 mL 3 mL Nebulization Given     02/15/2025 2225 sodium chloride 3 % inhalation solution 4 mL 4 mL Nebulization Given     02/15/2025 2156 multi-electrolyte (ISOLYTE-S PH 7.4) bolus 500 mL 500 mL Intravenous New Bag            Scheduled Medications:  apixaban, 5 mg, Per PEG Tube, BID  chlorhexidine, 15 mL, Mouth/Throat, Q12H ISAEL  ipratropium-albuterol, 3 mL, Nebulization, Q6H  pantoprazole, 40 mg, Intravenous, Q24H ISAEL  potassium chloride, 40 mEq, Oral, Once  QUEtiapine, 200 mg, Per PEG Tube, HS  sodium chloride, 4 mL, Nebulization, Q6H    PRN Meds:  acetaminophen, 650 mg, Per NG Tube, Q6H PRN  LORazepam, 1 mg, Intravenous, Q6H PRN      ED Triage Vitals   Temperature Pulse Respirations Blood Pressure SpO2 Pain Score   02/15/25 1557 02/15/25 1557 02/15/25 1557 02/15/25 1557 02/15/25 1558 02/15/25  2245   98.3 °F (36.8 °C) (!) 122 18 128/59 97 % No Pain     Weight (last 2 days)       Date/Time Weight    02/16/25 0600 105 (230.82)    02/15/25 2152 102 (225.97)    02/15/25 1557 103 (227.07)            Vital Signs (last 3 days)       Date/Time Temp Pulse Resp BP MAP (mmHg) SpO2 FiO2 (%) O2 Device Patient Position - Orthostatic VS Purvi Coma Scale Score Pain    02/17/25 0900 -- 75 22 126/78 96 98 % -- -- -- -- --    02/17/25 0809 -- 73 18 -- -- 97 % -- -- -- -- --    02/17/25 0800 97.6 °F (36.4 °C) 69 18 98/63 76 98 % 40 Ventilator Lying 11 --    02/17/25 0748 -- -- -- -- -- 97 % -- -- -- -- --    02/17/25 0721 96.6 °F (35.9 °C) -- -- -- -- -- -- -- -- -- --    02/17/25 0700 -- 74 18 91/52 65 97 % -- -- -- -- --    02/17/25 0600 -- 74 22 138/74 99 99 % -- -- -- -- --    02/17/25 0500 -- 65 18 97/59 73 98 % -- -- -- -- --    02/17/25 0430 -- -- -- -- -- -- -- -- -- 11 --    02/17/25 0409 97.5 °F (36.4 °C) 69 18 91/53 66 95 % -- -- -- -- --    02/17/25 0400 -- 69 18 91/53 66 94 % -- -- -- -- --    02/17/25 0327 -- -- -- -- -- 97 % -- -- -- -- --    02/17/25 0300 -- 72 18 95/54 69 93 % -- -- -- -- --    02/17/25 0200 -- 72 18 88/51 64 93 % -- -- -- -- --    02/17/25 0100 -- 69 18 98/53 68 97 % -- -- -- -- --    02/17/25 0030 -- -- -- -- -- -- -- -- -- 11 --    02/17/25 0000 96.9 °F (36.1 °C) 73 23 97/59 73 98 % -- -- -- -- --    02/16/25 2300 -- 72 22 92/50 66 98 % -- -- -- -- --    02/16/25 2100 -- 104 25 129/66 90 99 % -- -- -- -- --    02/16/25 2015 -- -- -- -- -- -- -- -- -- 11 --    02/16/25 2000 97.6 °F (36.4 °C) 92 18 138/72 96 98 % 40 Ventilator Lying -- --    02/16/25 1932 -- -- -- -- -- 97 % 40 Ventilator -- -- --    02/16/25 1900 -- 102 27 106/62 77 51 % -- -- -- -- --    02/16/25 1800 -- 97 17 108/66 82 97 % -- -- -- -- --    02/16/25 1600 -- 95 27 122/81 97 98 % -- -- -- 11 No Pain    02/16/25 1500 97.5 °F (36.4 °C) -- -- -- -- 98 % -- -- -- -- --    02/16/25 1355 -- -- -- -- -- 95 % -- Ventilator --  -- --    02/16/25 1200 -- -- -- -- -- -- -- -- -- 11 --    02/16/25 1022 -- -- -- -- -- 95 % -- -- -- -- --    02/16/25 0800 97.7 °F (36.5 °C) 70 18 105/55 75 97 % -- -- -- 11 No Pain    02/16/25 0746 -- 69 18 -- -- 97 % -- -- -- -- --    02/16/25 0700 -- 67 18 121/68 87 97 % -- -- -- -- --    02/16/25 0600 -- 74 18 97/60 73 97 % -- -- -- -- --    02/16/25 0500 -- 70 18 95/61 72 98 % -- -- -- -- --    02/16/25 0420 97 °F (36.1 °C) 73 -- -- -- 97 % -- -- -- -- --    02/16/25 0400 -- 70 18 100/68 80 96 % 60 Ventilator Lying 10 No Pain    02/16/25 0328 -- -- -- -- -- 96 % -- -- -- -- --    02/16/25 0300 -- 74 18 96/63 74 95 % -- -- -- -- --    02/16/25 0200 -- 77 18 94/57 69 93 % -- -- -- -- --    02/16/25 0137 -- -- -- -- -- 95 % -- -- -- -- --    02/16/25 0100 -- 74 18 102/58 75 95 % -- -- -- -- --    02/16/25 0000 98.2 °F (36.8 °C) 75 22 119/56 81 99 % 60 Ventilator Lying -- No Pain    02/15/25 2332 -- -- -- -- -- -- -- -- -- 10 No Pain    02/15/25 2300 -- 83 32 102/73 83 99 % -- -- -- -- --    02/15/25 2245 97.6 °F (36.4 °C) 89 28 101/76 82 99 % 60 Ventilator Lying -- No Pain    02/15/25 2230 -- -- -- -- -- 100 % -- -- -- -- --    02/15/25 1930 -- 84 18 120/63 86 98 % -- -- -- -- --    02/15/25 1900 -- 90 18 111/64 81 98 % -- -- -- -- --    02/15/25 1845 -- 95 18 94/51 71 98 % -- -- -- -- --    02/15/25 1800 -- 123 20 127/58 84 99 % -- -- -- -- --    02/15/25 1739 -- 112 18 117/59 -- 99 % -- None (Room air) Lying -- --    02/15/25 1730 -- 114 23 111/88 93 99 % -- Ventilator -- -- --    02/15/25 1715 -- 118 20 112/59 80 98 % -- -- -- -- --    02/15/25 1645 -- 124 18 111/57 -- 98 % -- -- -- -- --    02/15/25 1642 -- -- -- -- -- -- -- -- -- 10 --    02/15/25 1641 -- -- -- -- -- -- 60 Ventilator -- -- --    02/15/25 1605 -- -- -- -- -- -- 60 Ventilator -- -- --    02/15/25 1558 -- -- -- -- -- 97 % -- -- -- -- --    02/15/25 1557 98.3 °F (36.8 °C) 122 18 128/59 -- -- -- Ventilator Lying -- --    02/15/25 1555 -- -- --  -- -- -- -- -- -- 10 --              Pertinent Labs/Diagnostic Test Results:   Radiology:  XR chest 1 view portable   ED Interpretation by Jay Prieto MD (02/15 1749)   Trach well positioned, no acute findings compared to previous film per my interpretation       Final Interpretation by Joel Clement MD (02/16 0915)      Low lung volumes. Diffuse lung opacities may represent edema versus pneumonia. Small bilateral pleural effusions.            Workstation performed: [id number]           Cardiology:  ECG 12 lead   Final Result by Marck Ignacio MD (02/16 1029)   Sinus tachycardia   Inferior infarct , age undetermined   Abnormal ECG   Confirmed by Marck Ignacio ([id number]) on 2/16/2025 10:29:32 AM        GI:  No orders to display           Results from last 7 days   Lab Units 02/17/25  0544 02/16/25  0602 02/15/25  1626 02/15/25  0527 02/14/25  0458   WBC Thousand/uL 5.69 9.28 14.97* 16.48* 4.70   HEMOGLOBIN g/dL 9.1* 9.3* 10.1* 9.7* 9.8*   HEMATOCRIT % 29.1* 28.5* 32.4* 30.2* 31.0*   PLATELETS Thousands/uL 169 191 218 213 207   TOTAL NEUT ABS Thousands/µL 4.09 7.62 13.08*  --  2.85         Results from last 7 days   Lab Units 02/17/25  0544 02/16/25  0602 02/15/25  1626 02/15/25  0527 02/14/25  0458   SODIUM mmol/L 135 135 135 135 139   POTASSIUM mmol/L 3.9 3.5 3.9 4.4 2.8*   CHLORIDE mmol/L 100 99 99 96 96   CO2 mmol/L 26 23 27 24 27   ANION GAP mmol/L 9 13 9 15* 16*   BUN mg/dL 13 13 12 12 15   CREATININE mg/dL 0.38* 0.31* 0.45* 0.41* 0.32*   EGFR ml/min/1.73sq m 154 168 144 150 166   CALCIUM mg/dL 8.9 8.7 8.9 9.3 9.4   MAGNESIUM mg/dL 2.2 2.1  --  2.0 1.9   PHOSPHORUS mg/dL 3.4 3.3  --  4.5 1.8*     Results from last 7 days   Lab Units 02/15/25  1626 02/13/25  2103   AST U/L 13 14   ALT U/L 15 21   ALK PHOS U/L 98 100   TOTAL PROTEIN g/dL 7.1 8.3   ALBUMIN g/dL 3.4* 4.3   TOTAL BILIRUBIN mg/dL 0.45 0.38           Results from last 7 days   Lab Units 02/17/25  0544 02/16/25  0602 02/15/25  1626 02/15/25  0527  "02/14/25  0458 02/13/25  2103   GLUCOSE RANDOM mg/dL 81 92 74 79 94 85             No results found for: \"BETA-HYDROXYBUTYRATE\"       Results from last 7 days   Lab Units 02/16/25  0602 02/15/25  1738 02/15/25  1634   PH SYLVESTER  7.511* 7.378 7.237*   PCO2 SYLVESTER mm Hg 36.6* 47.9 68.3*   PO2 SYLVESTER mm Hg 47.7* 53.6* 40.5   HCO3 SYLVESTER mmol/L 28.6 27.6 28.4   BASE EXC SYLVESTER mmol/L 5.4 1.9 -0.2   O2 CONTENT SYLVESTER ml/dL 12.2 13.0 10.0   O2 HGB, VENOUS % 84.8* 85.0* 62.2                     Results from last 7 days   Lab Units 02/15/25  1634   PROTIME seconds 17.5*   INR  1.38*   PTT seconds 38*         Results from last 7 days   Lab Units 02/17/25  0544 02/16/25  0602 02/15/25  1626 02/15/25  0527 02/14/25  0458   PROCALCITONIN ng/ml 0.21 0.27* 0.23 0.12 <0.05     Results from last 7 days   Lab Units 02/15/25  1626 02/13/25  2103   LACTIC ACID mmol/L 1.0 1.1                                 Results from last 7 days   Lab Units 02/13/25  2103   LIPASE u/L 28                 Results from last 7 days   Lab Units 02/13/25  2116   CLARITY UA  Clear   COLOR UA  Yellow   SPEC GRAV UA  >=1.030   PH UA  6.0   GLUCOSE UA mg/dl Negative   KETONES UA mg/dl Negative   BLOOD UA  Negative   PROTEIN UA mg/dl Negative   NITRITE UA  Negative   BILIRUBIN UA  Negative   UROBILINOGEN UA E.U./dl 0.2   LEUKOCYTES UA  Negative                                 Results from last 7 days   Lab Units 02/15/25  1738 02/15/25  1626   BLOOD CULTURE  No Growth at 24 hrs. No Growth at 24 hrs.                   Past Medical History:   Diagnosis Date    Anxiety     Cancer ([facility])     Depression     Hypernatremia 10/06/2024    Migration of percutaneous endoscopic gastrostomy (PEG) tube ([facility]) 09/24/2023    Psychiatric disorder     bipolar    Sepsis ([facility]) 08/24/2023     Present on Admission:   History of pulmonary embolus (PE)   Mood disorder (HCC)   Mixed axonal-demyelinating polyneuropathy   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HCC)   Chronic respiratory failure with hypoxia and " hypercapnia (HCC)   SIRS (systemic inflammatory response syndrome) (HCC)      Admitting Diagnosis: Hypotension [I95.9]  Ventilator dependent (HCC) [Z99.11]  History of pulmonary embolus (PE) [Z86.711]  Mixed axonal-demyelinating polyneuropathy [G62.89]  S/P percutaneous endoscopic gastrostomy (PEG) tube placement (HCC) [Z93.1]  Acute on chronic respiratory failure with hypoxia and hypercapnia (HCC) [J96.21, J96.22]  Age/Sex: 39 y.o. male    Network Utilization Review Department  ATTENTION: Please call with any questions or concerns to [phone] and carefully listen to the prompts so that you are directed to the right person. All voicemails are confidential.   For Discharge needs, contact Care Management DC Support Team at [phone] opt. [phone]  Send all requests for admission clinical reviews, approved or denied determinations and any other requests to dedicated fax number below belonging to the campus where the patient is receiving treatment. List of dedicated fax numbers for the Facilities:  FACILITY NAME UR FAX NUMBER   ADMISSION DENIALS (Administrative/Medical Necessity) [phone]   DISCHARGE SUPPORT TEAM (NETWORK) [phone]   PARENT CHILD HEALTH (Maternity/NICU/Pediatrics) [phone]   [facility] [phone]   [facility] [phone]   [facility] [phone]   [facility] [phone]   [facility] [phone]   [facility] [phone]   [facility] [phone]   [facility] [phone]   [facility] [phone]   [facility] [phone]   [facility] [phone]   [facility] [phone]

## 2025-02-16 NOTE — RESPIRATORY THERAPY NOTE
Transported patient from ED to ICU on ventilator. Patient tolerated well. Ambu bag and mask bedside. Vent is plugged into red outlet.

## 2025-02-16 NOTE — RESPIRATORY THERAPY NOTE
Pt recd on a Hook c3 vent setting as per flow sheet all alarms on and functional vent working properly. Vent check. Pt sxn prn. Pt wrist restrains are secure. Vent pluged into red outlet

## 2025-02-16 NOTE — ED NOTES
Patient noted to have 3 episodes of diaphoresis followed by a desaturation in O2 as low as 64%. Pt inline suctioned at the time. Respiratory and ED attending called to bedside for pt and vent evaluation. Pt returned to normal O2 sat following inline suctioning. ICU NP came to bedside for ICU consult, decision made to admit the pt overnight to continue to observe for desaturations. Pt informed of plan of care and nodded in response.      Juana Sweeney RN  02/15/25 8172

## 2025-02-16 NOTE — H&P
H&P - Critical Care/ICU   Name: Hemanth Swanson 39 y.o. male I MRN: [id number]  Unit/Bed#: ED CT1 I Date of Admission: 2/15/2025   Date of Service: 2/15/2025 I Hospital Day: 0       Assessment & Plan  Chronic respiratory failure with hypoxia and hypercapnia (HCC)  S/p trach for neuromuscular disease  Chronic settings of 18/500/8/40%  There was previous concern for home vent malfunction when he was admitted on 2/13 - per sister, plan from company was to use transport ventilator until new ventilator can be supplied. However patient was hypotensive en route and re-routed back to ED, also concern for missing hook up piece to temporary transport vent which was been rectified    Unclear why hypercarbic after arrival to ED again - should have been on [facility] transport vent during transport. ?mucus plugging/atelectasis  Initial VBG 7.37/68.3/40.5/28.4/-0.2   Repeat VBG normalized 7.37/47.9/53.6/27.6/1.9  Episode of hypoxia to 50-60s x 2 in the ED, patient does have hx of this with intentional breath holding when he becomes agitated, also may be d/t mucus plugging to some degree  Pulmonary toilet; continue home duo-nebs q6h, add 3% nebs q6h and chest PT TID   Maintain O2 sat 92% or above   VAP preventions; chlorhexidine, PPI, HOB greater than 30 degrees  SIRS (systemic inflammatory response syndrome) (HCC)  SIRS with tachycardia and leukocytosis (14)  No obvious source of infection and slightly improved on labs this afternoon  2/15: CXR-low lung volumes/bibasilar atelectasis, some scattered B/L patchy opacifications. Appears similar to prior studies   Procal negative x 4  BC x 2 drawn in ED   Flu/COVID negative   Monitor off abx   Episode of hypotension en route which self resolved and then several hours later had episode of hypotension with SBP 80s in ED and given 500mL Isolyte with improvement. May have been related to seroquel administration  Mood disorder (HCC)  Intermittent agitation, will pull medical equipment, spit and  swing at staff, and mouth curse words   Continue B/L soft wrist restraints for safety   Continue seroquel 200mg qHS with PRN IV Ativan for agitation, can also use 25mg PRN seroquel for agitation per recent psych recs   History of pulmonary embolus (PE)  Continue home Eliquis  Mixed axonal-demyelinating polyneuropathy  Follows with neurology  Supportive care  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HCC)  S/p resection and chemotherapy  Right chest wall port remains in place  S/P percutaneous endoscopic gastrostomy (PEG) tube placement (HCC)  Recently replaced 2/13 after self removal  Resume tube feedings   Disposition: Stepdown Level 1    History of Present Illness   Hemanth Swanson is a 39 y.o. male past medical history of mixed axonal demyelinating polyneuropathy, trach/vent dependent, PEG tube in place, and history of PE on Eliquis who was admitted on 2/14 after pulling out PEG tube and there was concern for suicidal ideations in the ED when he began trying to rip out his trach.  Patient was also found to be mildly hypercarbic and there was concern for leak in home vent. He was admitted to critical care status d/t his chronic trach vent stasis and hypercarbia resolved once on hospital vent. Patient was cleared from psych prospective and sent home today where his sister cares for him, but he there was some confusion with the new vent he was suppose to be on and not having the appropriate piece, as well as an episode of hypotensive en route, and [facility] brought him back to the ED. He has been in the ED for several hours where they got the appropriate vent piece and were planning on discharging him home but he has had 2 hypoxic episodes where he desaturated to 50-60s and ICU admission was requested. Of note patient does have hx of these transient hypoxia episodes with intentional breath holding when he becomes agitated, also may be d/t mucus plugging to some degree. CXR shows low lung volumes/bibasilar atelectasis, some scattered  B/L patchy opacifications. Appears similar to prior studies. SIRS with leukocytosis (14) and tachycardia but no fever, no procal elevation, and normal lactic. Patient did have another episode of hypotension with SBP 80s in ED which resolved with 500mL Isolyte bolus. Patient will be admitted as observation to the critical care service with hopes that he can be discharged home successfully tomorrow. I updated patient's sister Dmitry via phone and she is in agreement with this plan.   History obtained from chart review and patient is known to me from prior admissions.    On call critical care physician, Dr. Alas, updated via phone.     Review of Systems: Review of Systems not obtainable due to non-verbal, agitation    Historical Information   Past Medical History:  No date: Anxiety  No date: Cancer (HCC)  No date: Depression  10/06/2024: Hypernatremia  09/24/2023: Migration of percutaneous endoscopic gastrostomy (PEG)   tube (HCC)  No date: Psychiatric disorder      Comment:  bipolar  08/24/2023: Sepsis (HCC) Past Surgical History:  01/20/2023: IR BIOPSY LYMPH NODE  6/18/2024: IR GASTROSTOMY (G) TUBE CHECK/CHANGE/REINSERTION/UPSIZE  09/25/2023: IR GASTROSTOMY TUBE PLACEMENT  09/06/2023: IR LUMBAR PUNCTURE  10/25/2024: IR LUMBAR PUNCTURE  03/14/2023: IR PORT PLACEMENT  12/14/2022: ORCHIECTOMY; Left      Comment:  Procedure: ORCHIECTOMY INGUINAL;  Surgeon: Flip Michael MD;  Location:  MAIN OR;  Service: Urology  09/08/2023: PEG W/TRACHEOSTOMY PLACEMENT; N/A      Comment:  Procedure: TRACHEOSTOMY WITH INSERTION PEG TUBE;                 Surgeon: Rudy Mcmanus MD;  Location: [facility];                 Service: General  No date: TESTICLE SURGERY   Current Outpatient Medications   Medication Instructions    acetylcysteine (MUCOMYST) 600 mg, Nebulization, Every 6 hours    apixaban (ELIQUIS) 5 mg, Per PEG Tube, 2 times daily    Incontinence Supply Disposable (Comfort Shield Adult Diapers) MISC  "1 each, Does not apply, 4 times daily    ipratropium-albuterol (DUO-NEB) 0.5-2.5 mg/3 mL nebulizer solution 3 mL, Nebulization, Every 6 hours (RESP)    NEEDLE, DISP, 18 G 18G X 1\" MISC Does not apply, 2 times weekly    Oral Hygiene Products (Toothette Swabs/Dentifrice) SWAB Mouth/Throat, 3 times daily    QUEtiapine Fumarate 150 mg, Oral, Daily at bedtime    Syringe/Needle, Disp, (Syringe Luer Slip) 25G X 5/8\" 1 ML MISC Does not apply, 2 times weekly    Allergies   Allergen Reactions    Dog Epithelium Cough, Sneezing and Nasal Congestion      Social History     Tobacco Use    Smoking status: Former     Current packs/day: 0.00     Average packs/day: 0.7 packs/day for 22.7 years (15.0 ttl pk-yrs)     Types: Cigarettes     Start date: 2008     Quit date: 2023     Years since quittin.7     Passive exposure: Never    Smokeless tobacco: Never   Vaping Use    Vaping status: Former    Start date: 2018    Quit date: 2023    Substances: Nicotine, THC   Substance Use Topics    Alcohol use: Not Currently     Comment: 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 Not currently using crack cocaine.    Family History   Problem Relation Age of Onset    Coronary artery disease Maternal Aunt     Cancer Father     Diabetes Father     Hypertension Father           Objective :                   Vitals I/O      Most Recent Min/Max in 24hrs   Temp 98.3 °F (36.8 °C) Temp  Min: 98 °F (36.7 °C)  Max: 98.4 °F (36.9 °C)   Pulse 84 Pulse  Min: 84  Max: 124   Resp 18 Resp  Min: 16  Max: 23   /63 BP  Min: 87/54  Max: 128/59   O2 Sat 98 % SpO2  Min: 95 %  Max: 99 %    No intake or output data in the 24 hours ending 02/15/25 2223    Diet Enteral/Parenteral; Tube Feeding No Oral Diet; Jevity 1.5; Continuous; 65; 100; Water; Every 4 hours    Invasive Monitoring   N/A         Physical Exam   Physical Exam  Eyes:      Pupils: " Pupils are equal, round, and reactive to light.   Skin:     General: Skin is warm.      Capillary Refill: Capillary refill takes less than 2 seconds.   HENT:      Head: Normocephalic and atraumatic.   Neck:      Trachea: Trachea normal.   Cardiovascular:      Rate and Rhythm: Regular rhythm. Tachycardia present.      Pulses: Normal pulses.           Radial pulses are 2+ on the right side and 2+ on the left side.        Dorsalis pedis pulses are 2+ on the right side and 2+ on the left side.      Heart sounds: Normal heart sounds, S1 normal and S2 normal.   Musculoskeletal:      Cervical back: Neck supple.      Comments: HINES, generalized weakness    Abdominal: General: Bowel sounds are normal.      Palpations: Abdomen is soft.      Comments: PEG tube site C/D/I    Constitutional:       General: He is awake.      Appearance: He is diaphoretic.      Interventions: He is restrained.   Pulmonary:      Effort: Pulmonary effort is normal.      Comments: Course scattered rhonchi   Psychiatric:      Comments: Intermittent agitation and shaking    Neurological:      Mental Status: He is alert. Mental status is at baseline.      GCS: GCS eye subscore is 4. GCS verbal subscore is 1. GCS motor subscore is 6.          Diagnostic Studies        Imaging: I have reviewed the following results:  2/15: CXR-low lung volumes/bibasilar atelectasis, some scattered B/L patchy opacifications. Appears similar to prior studies.   EKG: Sinus tachycardia-rate 121     Medications:  Scheduled PRN   apixaban, 5 mg, BID  chlorhexidine, 15 mL, Q12H ISAEL  ipratropium-albuterol, 3 mL, Q6H  multi-electrolyte, 500 mL, Once  [START ON 2/16/2025] pantoprazole, 40 mg, Q24H ISAEL  [START ON 2/16/2025] QUEtiapine, 200 mg, HS  QUEtiapine, 50 mg, Once  sodium chloride, 4 mL, Q6H      acetaminophen, 650 mg, Q6H PRN  LORazepam, 1 mg, Q6H PRN       Continuous          Labs:   CBC    Recent Labs     02/15/25  0527 02/15/25  1626   WBC 16.48* 14.97*   HGB 9.7* 10.1*    HCT 30.2* 32.4*    218     BMP    Recent Labs     02/15/25  0527 02/15/25  1626   SODIUM 135 135   K 4.4 3.9   CL 96 99   CO2 24 27   AGAP 15* 9   BUN 12 12   CREATININE 0.41* 0.45*   CALCIUM 9.3 8.9       Coags    Recent Labs     02/15/25  1634   INR 1.38*   PTT 38*        Additional Electrolytes  Recent Labs     02/14/25  0458 02/15/25  0527   MG 1.9 2.0   PHOS 1.8* 4.5          Blood Gas    No recent results  Recent Labs     02/15/25  1738   PHVEN 7.378   ATK8LOD 47.9   PO2VEN 53.6*   UMM7UHJ 27.6   BEVEN 1.9   V6PACXQ 85.0*    LFTs  Recent Labs     02/15/25  1626   ALT 15   AST 13   ALKPHOS 98   ALB 3.4*   TBILI 0.45       Infectious  Recent Labs     02/15/25  0527 02/15/25  1626   PROCALCITONI 0.12 0.23     Glucose  Recent Labs     02/14/25  0458 02/15/25  0527 02/15/25  1626   GLUC 94 79 74

## 2025-02-16 NOTE — PROGRESS NOTES
Progress Note - Critical Care/ICU   Name: Hemanth Swanson 39 y.o. male I MRN: [id number]  Unit/Bed#: ICU 01 I Date of Admission: 2/15/2025   Date of Service: 2/16/2025 I Hospital Day: 0      Assessment & Plan  Chronic respiratory failure with hypoxia and hypercapnia (HCC)  S/p trach for neuromuscular disease  Chronic settings of 18/500/8/40%  There was previous concern for home vent malfunction when he was admitted on 2/13 - per sister, plan from company was to use transport ventilator until new ventilator can be supplied. However patient was hypotensive en route and re-routed back to ED, also concern for missing hook up piece to temporary transport vent which was been rectified    Unclear why hypercarbic after arrival to ED again - should have been on [facility] transport vent during transport. ?mucus plugging/atelectasis  Initial VBG 7.37/68.3/40.5/28.4/-0.2   Repeat VBG normalized 7.37/47.9/53.6/27.6/1.9  Episode of hypoxia to 50-60s x 2 in the ED, patient does have hx of this with intentional breath holding when he becomes agitated, also may be d/t mucus plugging to some degree  Pulmonary toilet; continue home duo-nebs q6h, add 3% nebs q6h and chest PT TID   Maintain O2 sat 92% or above   VAP preventions; chlorhexidine, PPI, HOB greater than 30 degrees  SIRS (systemic inflammatory response syndrome) (HCC)  SIRS with tachycardia and leukocytosis (14)  No obvious source of infection and slightly improved on labs this afternoon  2/15: CXR-low lung volumes/bibasilar atelectasis, some scattered B/L patchy opacifications. Appears similar to prior studies   Procal negative x 4  BC x 2 drawn in ED   Flu/COVID negative   Monitor off abx   Episode of hypotension en route which self resolved and then several hours later had episode of hypotension with SBP 80s in ED and given 500mL Isolyte with improvement. May have been related to seroquel administration  Mood disorder (HCC)  Intermittent agitation, will pull medical equipment,  spit and swing at staff, and mouth curse words   Continue B/L soft wrist restraints for safety   Continue seroquel 200mg qHS with PRN IV Ativan for agitation, can also use 25mg PRN seroquel for agitation per recent psych recs   History of pulmonary embolus (PE)  Continue home Eliquis  Mixed axonal-demyelinating polyneuropathy  Follows with neurology  Supportive care  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HCC)  S/p resection and chemotherapy  Right chest wall port remains in place  S/P percutaneous endoscopic gastrostomy (PEG) tube placement (HCC)  Recently replaced 2/13 after self removal  Resume tube feedings   Disposition: Stepdown Level 1    ICU Core Measures     Vented Patient  VAP Bundle  VAP bundle ordered     A: Assess, Prevent, and Manage Pain Has pain been assessed? Yes  Need for changes to pain regimen? No   B: Both Spontaneous Awakening Trials (SATs) and Spontaneous Breathing Trials (SBTs) Plan to perform spontaneous awakening trial today? Chronic vent   Plan to perform spontaneous breathing trial today? Chronic vent   Obvious barriers to extubation? NA   C: Choice of Sedation RASS Goal: N/A patient not on sedation  Need for changes to sedation or analgesia regimen? NA   D: Delirium CAM-ICU: Unable to perform secondary to Dementia or other chronic cognitive dysfunction   E: Early Mobility  Plan for early mobility? Yes   F: Family Engagement Plan for family engagement today? Yes       Review of Invasive Devices:      Central access plan:  unaccessed port       Prophylaxis:  VTE VTE covered by:  apixaban, Per PEG Tube, 5 mg at 02/15/25 6071       Stress Ulcer  covered bypantoprazole (PROTONIX) injection 40 mg [[id number]]         24 Hour Events : Rested comfortably overnight. Has been calm and cooperative since arrival to ICU. No desaturation or hypotensive episodes.     Subjective   Review of Systems: Review of Systems not obtainable due to non-verbal    Objective :                   Vitals I/O      Most Recent Min/Max  in 24hrs   Temp (!) 97 °F (36.1 °C) Temp  Min: 97 °F (36.1 °C)  Max: 98.4 °F (36.9 °C)   Pulse 70 Pulse  Min: 70  Max: 124   Resp 18 Resp  Min: 16  Max: 32   BP 95/61 BP  Min: 90/54  Max: 128/59   O2 Sat 98 % SpO2  Min: 93 %  Max: 100 %      Intake/Output Summary (Last 24 hours) at 2/16/2025 0537  Last data filed at 2/15/2025 2322  Gross per 24 hour   Intake 540 ml   Output --   Net 540 ml       Diet Enteral/Parenteral; Tube Feeding No Oral Diet; Jevity 1.5; Continuous; 65; 100; Water; Every 4 hours    Invasive Monitoring   N/A         Physical Exam   Physical Exam  Eyes:      General: Lids are normal.      Extraocular Movements: Extraocular movements intact.      Conjunctiva/sclera: Conjunctivae normal.      Pupils: Pupils are equal, round, and reactive to light.   Skin:     General: Skin is warm and dry.      Capillary Refill: Capillary refill takes less than 2 seconds.   HENT:      Head: Normocephalic and atraumatic.   Neck:      Trachea: Trachea normal.   Cardiovascular:      Rate and Rhythm: Normal rate and regular rhythm.      Pulses: Normal pulses.           Radial pulses are 2+ on the right side and 2+ on the left side.        Dorsalis pedis pulses are 2+ on the right side and 2+ on the left side.      Heart sounds: Normal heart sounds, S1 normal and S2 normal.   Musculoskeletal:      Cervical back: Neck supple.      Comments: HINES   Abdominal: General: Bowel sounds are normal.      Palpations: Abdomen is soft.      Comments: PEG tube site C/D/I    Constitutional:       General: He is awake.      Interventions: He is restrained.   Pulmonary:      Effort: Pulmonary effort is normal.      Comments: Course scattered rhonchi   Psychiatric:      Comments: Periods of agitation/shaking    Neurological:      Mental Status: He is alert. Mental status is at baseline.      GCS: GCS eye subscore is 4. GCS verbal subscore is 1. GCS motor subscore is 6.          Diagnostic Studies        No new imaging       Medications:  Scheduled PRN   apixaban, 5 mg, BID  chlorhexidine, 15 mL, Q12H ISAEL  ipratropium-albuterol, 3 mL, Q6H  pantoprazole, 40 mg, Q24H ISAEL  QUEtiapine, 200 mg, HS  sodium chloride, 4 mL, Q6H      acetaminophen, 650 mg, Q6H PRN  LORazepam, 1 mg, Q6H PRN       Continuous          Labs:   CBC    Recent Labs     02/15/25  0527 02/15/25  1626   WBC 16.48* 14.97*   HGB 9.7* 10.1*   HCT 30.2* 32.4*    218     BMP    Recent Labs     02/15/25  0527 02/15/25  1626   SODIUM 135 135   K 4.4 3.9   CL 96 99   CO2 24 27   AGAP 15* 9   BUN 12 12   CREATININE 0.41* 0.45*   CALCIUM 9.3 8.9       Coags    Recent Labs     02/15/25  1634   INR 1.38*   PTT 38*        Additional Electrolytes  Recent Labs     02/15/25  0527   MG 2.0   PHOS 4.5          Blood Gas    No recent results  Recent Labs     02/15/25  1738   PHVEN 7.378   IJD1ELH 47.9   PO2VEN 53.6*   GCN2RZH 27.6   BEVEN 1.9   [id number] 85.0*    LFTs  Recent Labs     02/15/25  1626   ALT 15   AST 13   ALKPHOS 98   ALB 3.4*   TBILI 0.45       Infectious  Recent Labs     02/15/25  0527 02/15/25  1626   PROCALCITONI 0.12 0.23     Glucose  Recent Labs     02/15/25  0527 02/15/25  1626   GLUC 79 74

## 2025-02-17 LAB
ANION GAP SERPL CALCULATED.3IONS-SCNC: 9 MMOL/L (ref 4–13)
BASOPHILS # BLD AUTO: 0.02 THOUSANDS/ΜL (ref 0–0.1)
BASOPHILS NFR BLD AUTO: 0 % (ref 0–1)
BUN SERPL-MCNC: 13 MG/DL (ref 5–25)
CALCIUM SERPL-MCNC: 8.9 MG/DL (ref 8.4–10.2)
CHLORIDE SERPL-SCNC: 100 MMOL/L (ref 96–108)
CO2 SERPL-SCNC: 26 MMOL/L (ref 21–32)
CREAT SERPL-MCNC: 0.38 MG/DL (ref 0.6–1.3)
EOSINOPHIL # BLD AUTO: 0.07 THOUSAND/ΜL (ref 0–0.61)
EOSINOPHIL NFR BLD AUTO: 1 % (ref 0–6)
ERYTHROCYTE [DISTWIDTH] IN BLOOD BY AUTOMATED COUNT: 16 % (ref 11.6–15.1)
GFR SERPL CREATININE-BSD FRML MDRD: 154 ML/MIN/1.73SQ M
GLUCOSE SERPL-MCNC: 81 MG/DL (ref 65–140)
HCT VFR BLD AUTO: 29.1 % (ref 36.5–49.3)
HGB BLD-MCNC: 9.1 G/DL (ref 12–17)
IMM GRANULOCYTES # BLD AUTO: 0.03 THOUSAND/UL (ref 0–0.2)
IMM GRANULOCYTES NFR BLD AUTO: 1 % (ref 0–2)
LYMPHOCYTES # BLD AUTO: 1.05 THOUSANDS/ΜL (ref 0.6–4.47)
LYMPHOCYTES NFR BLD AUTO: 19 % (ref 14–44)
MAGNESIUM SERPL-MCNC: 2.2 MG/DL (ref 1.9–2.7)
MCH RBC QN AUTO: 28.9 PG (ref 26.8–34.3)
MCHC RBC AUTO-ENTMCNC: 31.3 G/DL (ref 31.4–37.4)
MCV RBC AUTO: 92 FL (ref 82–98)
MONOCYTES # BLD AUTO: 0.43 THOUSAND/ΜL (ref 0.17–1.22)
MONOCYTES NFR BLD AUTO: 8 % (ref 4–12)
MRSA NOSE QL CULT: NORMAL
NEUTROPHILS # BLD AUTO: 4.09 THOUSANDS/ΜL (ref 1.85–7.62)
NEUTS SEG NFR BLD AUTO: 71 % (ref 43–75)
NRBC BLD AUTO-RTO: 0 /100 WBCS
PHOSPHATE SERPL-MCNC: 3.4 MG/DL (ref 2.7–4.5)
PLATELET # BLD AUTO: 169 THOUSANDS/UL (ref 149–390)
PMV BLD AUTO: 9.2 FL (ref 8.9–12.7)
POTASSIUM SERPL-SCNC: 3.9 MMOL/L (ref 3.5–5.3)
PROCALCITONIN SERPL-MCNC: 0.21 NG/ML
RBC # BLD AUTO: 3.15 MILLION/UL (ref 3.88–5.62)
SODIUM SERPL-SCNC: 135 MMOL/L (ref 135–147)
WBC # BLD AUTO: 5.69 THOUSAND/UL (ref 4.31–10.16)

## 2025-02-17 PROCEDURE — 80048 BASIC METABOLIC PNL TOTAL CA: CPT | Performed by: PHYSICIAN ASSISTANT

## 2025-02-17 PROCEDURE — 94760 N-INVAS EAR/PLS OXIMETRY 1: CPT

## 2025-02-17 PROCEDURE — 94640 AIRWAY INHALATION TREATMENT: CPT

## 2025-02-17 PROCEDURE — 94003 VENT MGMT INPAT SUBQ DAY: CPT

## 2025-02-17 PROCEDURE — 94669 MECHANICAL CHEST WALL OSCILL: CPT

## 2025-02-17 PROCEDURE — 85025 COMPLETE CBC W/AUTO DIFF WBC: CPT | Performed by: PHYSICIAN ASSISTANT

## 2025-02-17 PROCEDURE — 97161 PT EVAL LOW COMPLEX 20 MIN: CPT

## 2025-02-17 PROCEDURE — 94668 MNPJ CHEST WALL SBSQ: CPT

## 2025-02-17 PROCEDURE — 99232 SBSQ HOSP IP/OBS MODERATE 35: CPT | Performed by: ANESTHESIOLOGY

## 2025-02-17 PROCEDURE — 84145 PROCALCITONIN (PCT): CPT | Performed by: PHYSICIAN ASSISTANT

## 2025-02-17 PROCEDURE — 83735 ASSAY OF MAGNESIUM: CPT | Performed by: PHYSICIAN ASSISTANT

## 2025-02-17 PROCEDURE — 94150 VITAL CAPACITY TEST: CPT

## 2025-02-17 PROCEDURE — 84100 ASSAY OF PHOSPHORUS: CPT | Performed by: PHYSICIAN ASSISTANT

## 2025-02-17 RX ADMIN — IPRATROPIUM BROMIDE AND ALBUTEROL SULFATE 3 ML: .5; 3 SOLUTION RESPIRATORY (INHALATION) at 01:44

## 2025-02-17 RX ADMIN — IPRATROPIUM BROMIDE AND ALBUTEROL SULFATE 3 ML: .5; 3 SOLUTION RESPIRATORY (INHALATION) at 13:13

## 2025-02-17 RX ADMIN — SODIUM CHLORIDE SOLN NEBU 3% 4 ML: 3 NEBU SOLN at 07:48

## 2025-02-17 RX ADMIN — LORAZEPAM 1 MG: 2 INJECTION INTRAMUSCULAR; INTRAVENOUS at 17:07

## 2025-02-17 RX ADMIN — QUETIAPINE FUMARATE 200 MG: 100 TABLET ORAL at 21:29

## 2025-02-17 RX ADMIN — SODIUM CHLORIDE SOLN NEBU 3% 4 ML: 3 NEBU SOLN at 13:13

## 2025-02-17 RX ADMIN — SODIUM CHLORIDE SOLN NEBU 3% 4 ML: 3 NEBU SOLN at 19:22

## 2025-02-17 RX ADMIN — SODIUM CHLORIDE SOLN NEBU 3% 4 ML: 3 NEBU SOLN at 01:44

## 2025-02-17 RX ADMIN — APIXABAN 5 MG: 5 TABLET, FILM COATED ORAL at 09:16

## 2025-02-17 RX ADMIN — IPRATROPIUM BROMIDE AND ALBUTEROL SULFATE 3 ML: .5; 3 SOLUTION RESPIRATORY (INHALATION) at 07:48

## 2025-02-17 RX ADMIN — CHLORHEXIDINE GLUCONATE 15 ML: 1.2 SOLUTION ORAL at 21:29

## 2025-02-17 RX ADMIN — IPRATROPIUM BROMIDE AND ALBUTEROL SULFATE 3 ML: .5; 3 SOLUTION RESPIRATORY (INHALATION) at 19:22

## 2025-02-17 RX ADMIN — PANTOPRAZOLE SODIUM 40 MG: 40 INJECTION, POWDER, FOR SOLUTION INTRAVENOUS at 09:16

## 2025-02-17 RX ADMIN — CHLORHEXIDINE GLUCONATE 15 ML: 1.2 SOLUTION ORAL at 09:16

## 2025-02-17 RX ADMIN — APIXABAN 5 MG: 5 TABLET, FILM COATED ORAL at 17:07

## 2025-02-17 NOTE — WOUND OSTOMY CARE
Progress Note - Wound   Hemanth Swanson 39 y.o. male MRN: [id number]  Unit/Bed#: ICU 01 Encounter: [id number]        Assessment:   This is a 39 year old male patient admitted on 2/15/25 with chronic respiratory failure. Patient was received in ICU bed vented via oral endotracheal tube. He nods or shakes his head appropriately when asked a question. He was turned and repositioned with assist of 2 persons. He had condom catheter in place with episodes of fecal incontinence.    Assessment Findings:  1-Full thickness traumatic wound to right 1st, 2nd and 3rd fingers due to patient picking. Orders in place for wound care.  2-Unstageable wound to left inner ear with 100% yellow slough and old serosanguinous drainage. Orders in place for wound care.  3-Bilateral heels and sacrobuttocks intact - orders in place for skin care and for prevention.  4-Feeding tube with abdominal binder in place to prevent patient dislodgement.    Wound Care Plan:  1-Cleanse wound to left 1st, 2nd and 3rd fingers with NSS & pat dry. Apply thin layer of Normlgel to wounds, Adaptic cut to size of wounds, gauze, rolled gauze and tape. Change daily & prn soilage/dislodgement.  2-Cleanse wound to left inner ear with NSS or warm water, gauze and pat dry. Apply 3M No Sting daily for protection.  3-Cleanse feeding tube site with warm water, gauze and pat dry. Apply split gauze and secure with gentle paper tape. Change daily & prn soilage/dislodgement.  4-Apply Prevent barrier cream to bilateral sacrum, buttock and heels BID and PRN  5-Float heels on 2 pillows to offload pressure so heels are not in contact with mattress or pillows OR apply heel lift boots.  6-Ehob pressure redistribution cushion in chair when out of bed if able. Avoid prolonged sitting.  7-Moisturize skin daily with skin nourishing cream.  8-Turn/reposition q2h or when medically stable for pressure re-distribution on skin.      Feeding tube insertion site intact.    Wound 12/16/24  Traumatic Finger D1, thumb Left;Posterior (Active)   Wound Image  02/17/25 1139   Wound Description Granulation tissue;pink;slough;yellow 02/17/25 1139   Donna-wound Assessment Intact;Callus 02/17/25 1139   Wound Length (cm) 0.3 cm 02/17/25 1139   Wound Width (cm) 0.3 cm 02/17/25 1139   Wound Depth (cm) 0.1 cm 02/17/25 1139   Wound Surface Area (cm^2) 0.09 cm^2 02/17/25 1139   Wound Volume (cm^3) 0.009 cm^3 02/17/25 1139   Calculated Wound Volume (cm^3) 0.01 cm^3 02/17/25 1139   Change in Wound Size % 95.45 02/17/25 1139   Drainage Description Serosanguineous 02/17/25 1139   Non-staged Wound Description Full thickness 02/17/25 1139   Treatments Cleansed 02/17/25 1139   Dressing Dry dressing;Gauze;Normlgel;  Adaptic 02/17/25 1139   Dressing Changed New 02/17/25 1139   Dressing Status Clean;Dry;Intact 02/17/25 1139       Wound 12/17/24 Pressure Injury Ear Left (Active)   Wound Image   02/17/25 1208   Wound Description Slough;Yellow 02/17/25 1208   Pressure Injury Stage Unstageable 02/17/25 1208   Donna-wound Assessment Excoriated 02/17/25 1208   Wound Length (cm) 1 cm 02/17/25 1208   Wound Width (cm) 0.5 cm 02/17/25 1208   Wound Depth (cm) 0.1 cm 02/17/25 1208   Wound Surface Area (cm^2) 0.5 cm^2 02/17/25 1208   Wound Volume (cm^3) 0.05 cm^3 02/17/25 1208   Calculated Wound Volume (cm^3) 0.05 cm^3 02/17/25 1208   Change in Wound Size % -150 02/17/25 1208   Drainage Amount Moderate 02/17/25 1208   Drainage Description Serosanguineous 02/17/25 1208   Treatments Cleansed 02/17/25 1208   Dressing 3M No Sting 02/17/25 1208   Dressing Changed New 02/17/25 1208   Dressing Status Intact 02/17/25 1208       Wound 02/17/25 Traumatic Abrasion(s) Finger D2, index Left;Posterior (Active)   Wound Image   02/17/25 1139   Wound Description Slough;Yellow;Granulation tissue;Pink 02/17/25 1139   Donna-wound Assessment Intact 02/17/25 1139   Wound Length (cm) 0.6 cm 02/17/25 1139   Wound Width (cm) 0.6 cm 02/17/25 1139   Wound Depth (cm)  0.1 cm 02/17/25 1139   Wound Surface Area (cm^2) 0.36 cm^2 02/17/25 1139   Wound Volume (cm^3) 0.036 cm^3 02/17/25 1139   Calculated Wound Volume (cm^3) 0.04 cm^3 02/17/25 1139   Drainage Amount None 02/17/25 1139   Non-staged Wound Description Full thickness 02/17/25 1139   Treatments Cleansed 02/17/25 1139   Dressing Gauze;Dry dressing; Normlgel;Adaptic 02/17/25 1139   Dressing Changed New 02/17/25 1139   Dressing Status Clean;Dry;Intact 02/17/25 1139     Wound 02/17/25 Traumatic abrasion;Finger D3, long Left;Posterior (Active)    Wound Description Granulation tissue;Pink;Slough;Yellow 02/17/25 1139   Donna-wound Assessment Edema;Erythema 02/17/25 1139   Wound Length (cm) 0.3 cm 02/17/25 1139   Wound Width (cm) 0.3 cm 02/17/25 1139   Wound Depth (cm) 0.1 cm 02/17/25 1139   Wound Surface Area (cm^2) 0.09 cm^2 02/17/25 1139   Wound Volume (cm^3) 0.009 cm^3 02/17/25 1139   Calculated Wound Volume (cm^3) 0.01 cm^3 02/17/25 1139   Drainage Amount None 02/17/25 1139   Non-staged Wound Description Full thickness 02/17/25 1139   Treatments Cleansed 02/17/25 1139   Dressing Normlgel;Adaptic;Dry dressing;Gauze 02/17/25 1139   Dressing Changed New 02/17/25 1139   Dressing Status Clean;Dry;Intact 02/17/25 1139     Discussed assessment findings, and plan of care/recommendations with Lesley JEONG.    Wound care will follow along with patient weekly, please call or text with questions and concerns.    Recommendations written as orders.  Irma David MSN, RN, CWON

## 2025-02-17 NOTE — PLAN OF CARE
Problem: RESPIRATORY - ADULT  Goal: Achieves optimal ventilation and oxygenation  Description: INTERVENTIONS:  - Assess for changes in respiratory status  - Assess for changes in mentation and behavior  - Position to facilitate oxygenation and minimize respiratory effort  - Oxygen administered by appropriate delivery if ordered  - Initiate smoking cessation education as indicated  - Encourage broncho-pulmonary hygiene including cough, deep breathe, Incentive Spirometry  - Assess the need for suctioning and aspirate as needed  - Assess and instruct to report SOB or any respiratory difficulty  - Respiratory Therapy support as indicated  Outcome: Progressing     Problem: PAIN - ADULT  Goal: Verbalizes/displays adequate comfort level or baseline comfort level  Description: Interventions:  - Encourage patient to monitor pain and request assistance  - Assess pain using appropriate pain scale  - Administer analgesics based on type and severity of pain and evaluate response  - Implement non-pharmacological measures as appropriate and evaluate response  - Consider cultural and social influences on pain and pain management  - Notify physician/advanced practitioner if interventions unsuccessful or patient reports new pain  Outcome: Progressing     Problem: CARDIOVASCULAR - ADULT  Goal: Absence of cardiac dysrhythmias or at baseline rhythm  Description: INTERVENTIONS:  - Continuous cardiac monitoring, vital signs, obtain 12 lead EKG if ordered  - Administer antiarrhythmic and heart rate control medications as ordered  - Monitor electrolytes and administer replacement therapy as ordered  Outcome: Progressing     Problem: CARDIOVASCULAR - ADULT  Goal: Maintains optimal cardiac output and hemodynamic stability  Description: INTERVENTIONS:  - Monitor I/O, vital signs and rhythm  - Monitor for S/S and trends of decreased cardiac output  - Administer and titrate ordered vasoactive medications to optimize hemodynamic stability  -  Assess quality of pulses, skin color and temperature  - Assess for signs of decreased coronary artery perfusion  - Instruct patient to report change in severity of symptoms  Outcome: Progressing     Problem: Nutrition/Hydration-ADULT  Goal: Nutrient/Hydration intake appropriate for improving, restoring or maintaining nutritional needs  Description: Monitor and assess patient's nutrition/hydration status for malnutrition. Collaborate with interdisciplinary team and initiate plan and interventions as ordered.  Monitor patient's weight and dietary intake as ordered or per policy. Utilize nutrition screening tool and intervene as necessary. Determine patient's food preferences and provide high-protein, high-caloric foods as appropriate.     INTERVENTIONS:  - Monitor oral intake, urinary output, labs, and treatment plans  - Assess nutrition and hydration status and recommend course of action  - Evaluate amount of meals eaten  - Assist patient with eating if necessary   - Allow adequate time for meals  - Recommend/ encourage appropriate diets, oral nutritional supplements, and vitamin/mineral supplements  - Order, calculate, and assess calorie counts as needed  - Recommend, monitor, and adjust tube feedings and TPN/PPN based on assessed needs  - Assess need for intravenous fluids  - Provide specific nutrition/hydration education as appropriate  - Include patient/family/caregiver in decisions related to nutrition  Outcome: Progressing     Problem: SAFETY,RESTRAINT: NV/NON-SELF DESTRUCTIVE BEHAVIOR  Goal: Returns to optimal restraint-free functioning  Description: INTERVENTIONS:  - Assess the patient's behavior and symptoms that indicate continued need for restraint  - Identify and implement measures to help patient regain control  - Assess readiness for release of restraint   Outcome: Progressing

## 2025-02-17 NOTE — ASSESSMENT & PLAN NOTE
SIRS with tachycardia and leukocytosis (14)  No obvious source of infection   Leukocytosis resolved   2/15: CXR-low lung volumes/bibasilar atelectasis, some scattered B/L patchy opacifications. Appears similar to prior studies   Procal negative x 4  BC x 2 NG x 24 hrs   Flu/COVID negative   Monitor off abx   Episode of hypotension en route which self resolved and then several hours later had episode of hypotension with SBP 80s in ED and given 500mL Isolyte with improvement. May have been related to seroquel and ativan administration

## 2025-02-17 NOTE — OCCUPATIONAL THERAPY NOTE
Occupational Therapy Screen       02/17/25 5595   Note Type   Note type Screen   Cancel Reasons Other  (Dependent Baseline)   Additional Comments OT orders received. Chart reviewed. Per review, patient at baseline is dependent for all ADLs, uses Wallace lift for OOB mobility, has trach and PEG tube. Patient presents at baseline functional status, No skilled inpatient OT services indicated at this time. OT orders to be discharged   Licensure   [place] License Number  Cynthia Carranza, OTR/L [id number]

## 2025-02-17 NOTE — PROGRESS NOTES
Progress Note - Critical Care/ICU   Name: Hemanth Swanson 39 y.o. male I MRN: [id number]  Unit/Bed#: ICU 01 I Date of Admission: 2/15/2025   Date of Service: 2/17/2025 I Hospital Day: 0      Assessment & Plan  Chronic respiratory failure with hypoxia and hypercapnia (HCC)  S/p trach for neuromuscular disease  Chronic settings of 18/500/8/40%  There was previous concern for home vent malfunction when he was admitted on 2/13 - per sister, plan from company was to use transport ventilator until new ventilator can be supplied. However patient was hypotensive en route and re-routed back to ED, also concern for missing hook up piece to temporary transport vent which was been rectified    Unclear why hypercarbic after arrival to ED again - on [facility] transport vent during transport. ?mucus plugging/atelectasis  Initial VBG 7.23/68.3/40.5/28.4/-0.2   Repeat VBG normalized 7.37/47.9/53.6/27.6/1.9  Yesterday morning alkalotic-7.51/36.6/47.7/28.6/5.4   Episode of hypoxia to 50-60s x 2 in the ED, patient does have hx of this with intentional breath holding when he becomes agitated, also may be d/t mucus plugging to some degree  Had episodes of desaturation d/t mucus plugging yesterday   Pulmonary toilet; continue home duo-nebs and 3% nebs q6h and chest PT TID   Consider bronch for pulmonary toilet   Maintain O2 sat 92% or above   VAP preventions; chlorhexidine, PPI, HOB greater than 30 degrees  SIRS (systemic inflammatory response syndrome) (HCC)  SIRS with tachycardia and leukocytosis (14)  No obvious source of infection   Leukocytosis resolved   2/15: CXR-low lung volumes/bibasilar atelectasis, some scattered B/L patchy opacifications. Appears similar to prior studies   Procal negative x 4  BC x 2 NG x 24 hrs   Flu/COVID negative   Monitor off abx   Episode of hypotension en route which self resolved and then several hours later had episode of hypotension with SBP 80s in ED and given 500mL Isolyte with improvement. 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 Yes  Need for changes to pain regimen? No   B: Both Spontaneous Awakening Trials (SATs) and Spontaneous Breathing Trials (SBTs) Plan to perform spontaneous awakening trial today? Chronic vent   Plan to perform spontaneous breathing trial today? Chronic vent   Obvious barriers to extubation? NA   C: Choice of Sedation RASS Goal: N/A patient not on sedation  Need for changes to sedation or analgesia regimen? NA   D: Delirium CAM-ICU: Negative   E: Early Mobility  Plan for early mobility? Yes   F: Family Engagement Plan for family engagement today? Yes       Review of Invasive Devices:      Central access plan:  unaccessed port      Prophylaxis:  VTE VTE covered by:  apixaban, Per PEG Tube, 5 mg at 02/16/25 1327       Stress Ulcer  covered bypantoprazole (PROTONIX) injection 40 mg [[id number]]         24 Hour Events : Episodes of desaturation associated with mucus plugging yesterday. Also with intermittent episodes of intense diaphoresis. No acute overnight events.     Subjective   Review of Systems: Review of Systems   All  other systems reviewed and are negative.      Objective :                   Vitals I/O      Most Recent Min/Max in 24hrs   Temp 97.6 °F (36.4 °C) Temp  Min: 97 °F (36.1 °C)  Max: 97.7 °F (36.5 °C)   Pulse 73 Pulse  Min: 67  Max: 104   Resp (!) 23 Resp  Min: 17  Max: 27   BP 97/59 BP  Min: 92/50  Max: 138/72   O2 Sat 98 % SpO2  Min: 51 %  Max: 99 %      Intake/Output Summary (Last 24 hours) at 2/17/2025 0018  Last data filed at 2/16/2025 1734  Gross per 24 hour   Intake 715 ml   Output 1250 ml   Net -535 ml       Diet Enteral/Parenteral; Tube Feeding No Oral Diet; Jevity 1.5; Continuous; 65; 100; Water; Every 4 hours    Invasive Monitoring   N/A        Physical Exam   Physical Exam  Eyes:      General: Lids are normal.      Extraocular Movements: Extraocular movements intact.      Conjunctiva/sclera: Conjunctivae normal.      Pupils: Pupils are equal, round, and reactive to light.   Skin:     General: Skin is warm and dry.      Capillary Refill: Capillary refill takes less than 2 seconds.   HENT:      Head: Normocephalic and atraumatic.   Neck:      Trachea: Trachea normal.   Cardiovascular:      Rate and Rhythm: Normal rate and regular rhythm.      Pulses: Normal pulses.           Radial pulses are 2+ on the right side and 2+ on the left side.        Dorsalis pedis pulses are 2+ on the right side and 2+ on the left side.      Heart sounds: Normal heart sounds, S1 normal and S2 normal.   Musculoskeletal:      Cervical back: Neck supple.      Comments: HINES, generalized weakness   Abdominal: General: Bowel sounds are normal.      Palpations: Abdomen is soft.      Comments: PEG tube site C/D/I, abdominal binder in place   Constitutional:       General: He is awake.      Appearance: He is ill-appearing.      Interventions: He is restrained.   Pulmonary:      Effort: Pulmonary effort is normal.      Breath sounds: Examination of the left-upper field reveals decreased breath sounds. Examination of the left-lower field  reveals decreased breath sounds. Decreased breath sounds present.   Psychiatric:         Behavior: Behavior is cooperative.      Comments: Periods of agitation    Neurological:      General: No focal deficit present.      Mental Status: He is alert.      GCS: GCS eye subscore is 4. GCS verbal subscore is 1. GCS motor subscore is 6.      Comments: Nods appropriately to questions           Diagnostic Studies        No new imaging      Medications:  Scheduled PRN   apixaban, 5 mg, BID  chlorhexidine, 15 mL, Q12H ISAEL  ipratropium-albuterol, 3 mL, Q6H  pantoprazole, 40 mg, Q24H ISAEL  QUEtiapine, 200 mg, HS  sodium chloride, 4 mL, Q6H      acetaminophen, 650 mg, Q6H PRN  LORazepam, 1 mg, Q6H PRN       Continuous          Labs:   CBC    Recent Labs     02/15/25  1626 02/16/25  0602   WBC 14.97* 9.28   HGB 10.1* 9.3*   HCT 32.4* 28.5*    191     BMP    Recent Labs     02/15/25  1626 02/16/25  0602   SODIUM 135 135   K 3.9 3.5   CL 99 99   CO2 27 23   AGAP 9 13   BUN 12 13   CREATININE 0.45* 0.31*   CALCIUM 8.9 8.7       Coags    Recent Labs     02/15/25  1634   INR 1.38*   PTT 38*        Additional Electrolytes  Recent Labs     02/15/25  0527 02/16/25  0602   MG 2.0 2.1   PHOS 4.5 3.3          Blood Gas    No recent results  Recent Labs     02/16/25  0602   PHVEN 7.511*   DRX6KKI 36.6*   PO2VEN 47.7*   CPR3XET 28.6   BEVEN 5.4   W8YPDEA 84.8*    LFTs  Recent Labs     02/15/25  1626   ALT 15   AST 13   ALKPHOS 98   ALB 3.4*   TBILI 0.45       Infectious  Recent Labs     02/15/25  1626 02/16/25  0602   PROCALCITONI 0.23 0.27*     Glucose  Recent Labs     02/15/25  0527 02/15/25  1626 02/16/25  0602   GLUC 79 74 92        Administrative Statements

## 2025-02-17 NOTE — ED NOTES
2/17/25 @ 1200:  [facility] received copy of CFS crisis screening dated 2/14/25 which cleared patient for discharge home.  Report indicates that family says this is his normal behavior when he's not feeling well; has no psychiatric history, and has never attempted suicide.  Sister reported to crisis screener that he gets angry when he's not feeling well and usually pulls out his feeding tube or trach.  PES will provide information to Dr. Craft.  Roderick MS

## 2025-02-17 NOTE — PLAN OF CARE
Problem: PHYSICAL THERAPY ADULT  Goal: Performs mobility at highest level of function for planned discharge setting.  See evaluation for individualized goals.  Description:            See flowsheet documentation for full assessment, interventions and recommendations.  Outcome: Adequate for Discharge  Note:       Assessment: Patient seen for Physical Therapy evaluation. Patient admitted with Chronic respiratory failure with hypoxia and hypercapnia (HCC).  Comorbidities affecting patient's physical performance include: .  Patient is dependent from home with peg tube and trach/vent dependent.  Patient has been nonambulatory for several years has a mechanical lift at home and 24-hour care from family.  Patient is dependent for ADLs and mobility therefore no skilled PT/OT services required at this time. personal factors affecting patient at time of initial evaluation include: inability to ambulate household distances, inability to navigate community distances, inability to navigate level surfaces without external assistance, inability to perform dynamic tasks in community, inability to perform caregiver support/tasks, inability to perform physical activity, limited insight into impairments, inability to perform ADLS, inability to perform IADLS , and inability to live alone. Prior to admission, patient was dependent for mobility, requiring assist for ADLS, requiring assist for IADLS, having 24 hour care , and home with family assist.  Please find objective findings from Physical Therapy assessment regarding body systems outlined above with impairments and limitations including no new functional deficits.  The Barthel Index was used as a functional outcome tool presenting with a score of Barthel Index Score: 0 today indicating marked limitations of functional mobility and ADLS.  Patient's clinical presentation is currently stable  as seen in patient's presentation of no new functional deficits. As demonstrated by  objective findings, the assigned level of complexity for this evaluation is low.The patient's AM-PAC Basic Mobility Inpatient Short Form Raw Score is 6. A Raw score of less than or equal to 16 suggests the patient may benefit from discharge to home as patient is dependent, wheelchair/bedbound requiring 24-hour care prior to admission. Please also refer to the recommendation of the Physical Therapist for safe discharge planning.        Rehab Resource Intensity Level, PT: No post-acute rehabilitation needs    See flowsheet documentation for full assessment.

## 2025-02-17 NOTE — PLAN OF CARE
Problem: RESPIRATORY - ADULT  Goal: Achieves optimal ventilation and oxygenation  Description: INTERVENTIONS:  - Assess for changes in respiratory status  - Assess for changes in mentation and behavior  - Position to facilitate oxygenation and minimize respiratory effort  - Oxygen administered by appropriate delivery if ordered  - Initiate smoking cessation education as indicated  - Encourage broncho-pulmonary hygiene including cough, deep breathe, Incentive Spirometry  - Assess the need for suctioning and aspirate as needed  - Assess and instruct to report SOB or any respiratory difficulty  - Respiratory Therapy support as indicated  Outcome: Progressing     Problem: PAIN - ADULT  Goal: Verbalizes/displays adequate comfort level or baseline comfort level  Description: Interventions:  - Encourage patient to monitor pain and request assistance  - Assess pain using appropriate pain scale  - Administer analgesics based on type and severity of pain and evaluate response  - Implement non-pharmacological measures as appropriate and evaluate response  - Consider cultural and social influences on pain and pain management  - Notify physician/advanced practitioner if interventions unsuccessful or patient reports new pain  Outcome: Progressing     Problem: DISCHARGE PLANNING  Goal: Discharge to home or other facility with appropriate resources  Description: INTERVENTIONS:  - Identify barriers to discharge w/patient and caregiver  - Arrange for needed discharge resources and transportation as appropriate  - Identify discharge learning needs (meds, wound care, etc.)  - Arrange for interpretive services to assist at discharge as needed  - Refer to Case Management Department for coordinating discharge planning if the patient needs post-hospital services based on physician/advanced practitioner order or complex needs related to functional status, cognitive ability, or social support system  Outcome: Progressing     Problem:  NEUROSENSORY - ADULT  Goal: Achieves stable or improved neurological status  Description: INTERVENTIONS  - Monitor and report changes in neurological status  - Monitor vital signs such as temperature, blood pressure, glucose, and any other labs ordered   - Initiate measures to prevent increased intracranial pressure  - Monitor for seizure activity and implement precautions if appropriate      Outcome: Progressing     Problem: CARDIOVASCULAR - ADULT  Goal: Maintains optimal cardiac output and hemodynamic stability  Description: INTERVENTIONS:  - Monitor I/O, vital signs and rhythm  - Monitor for S/S and trends of decreased cardiac output  - Administer and titrate ordered vasoactive medications to optimize hemodynamic stability  - Assess quality of pulses, skin color and temperature  - Assess for signs of decreased coronary artery perfusion  - Instruct patient to report change in severity of symptoms  Outcome: Progressing  Goal: Absence of cardiac dysrhythmias or at baseline rhythm  Description: INTERVENTIONS:  - Continuous cardiac monitoring, vital signs, obtain 12 lead EKG if ordered  - Administer antiarrhythmic and heart rate control medications as ordered  - Monitor electrolytes and administer replacement therapy as ordered  Outcome: Progressing     Problem: GASTROINTESTINAL - ADULT  Goal: Maintains adequate nutritional intake  Description: INTERVENTIONS:  - Monitor percentage of each meal consumed  - Identify factors contributing to decreased intake, treat as appropriate  - Assist with meals as needed  - Monitor I&O, weight, and lab values if indicated  - Obtain nutrition services referral as needed  Outcome: Progressing     Problem: SKIN/TISSUE INTEGRITY - ADULT  Goal: Incision(s), wounds(s) or drain site(s) healing without S/S of infection  Description: INTERVENTIONS  - Assess and document dressing, incision, wound bed, drain sites and surrounding tissue  - Provide patient and family education  - Perform skin  care/dressing changes every shift  Outcome: Progressing     Problem: Prexisting or High Potential for Compromised Skin Integrity  Goal: Skin integrity is maintained or improved  Description: INTERVENTIONS:  - Identify patients at risk for skin breakdown  - Assess and monitor skin integrity  - Assess and monitor nutrition and hydration status  - Monitor labs   - Assess for incontinence   - Turn and reposition patient  - Assist with mobility/ambulation  - Relieve pressure over bony prominences  - Avoid friction and shearing  - Provide appropriate hygiene as needed including keeping skin clean and dry  - Evaluate need for skin moisturizer/barrier cream  - Collaborate with interdisciplinary team   - Patient/family teaching  - Consider wound care consult   Outcome: Progressing     Problem: SAFETY,RESTRAINT: NV/NON-SELF DESTRUCTIVE BEHAVIOR  Goal: Remains free of harm/injury (restraint for non violent/non self-detsructive behavior)  Description: INTERVENTIONS:  - Instruct patient/family regarding restraint use   - Assess and monitor physiologic and psychological status   - Provide interventions and comfort measures to meet assessed patient needs   - Identify and implement measures to help patient regain control  - Assess readiness for release of restraint   Outcome: Progressing  Goal: Returns to optimal restraint-free functioning  Description: INTERVENTIONS:  - Assess the patient's behavior and symptoms that indicate continued need for restraint  - Identify and implement measures to help patient regain control  - Assess readiness for release of restraint   Outcome: Progressing     Problem: Nutrition/Hydration-ADULT  Goal: Nutrient/Hydration intake appropriate for improving, restoring or maintaining nutritional needs  Description: Monitor and assess patient's nutrition/hydration status for malnutrition. Collaborate with interdisciplinary team and initiate plan and interventions as ordered.  Monitor patient's weight and  dietary intake as ordered or per policy. Utilize nutrition screening tool and intervene as necessary. Determine patient's food preferences and provide high-protein, high-caloric foods as appropriate.     INTERVENTIONS:  - Monitor oral intake, urinary output, labs, and treatment plans  - Assess nutrition and hydration status and recommend course of action  - Evaluate amount of meals eaten  - Assist patient with eating if necessary   - Allow adequate time for meals  - Recommend/ encourage appropriate diets, oral nutritional supplements, and vitamin/mineral supplements  - Order, calculate, and assess calorie counts as needed  - Recommend, monitor, and adjust tube feedings and TPN/PPN based on assessed needs  - Assess need for intravenous fluids  - Provide specific nutrition/hydration education as appropriate  - Include patient/family/caregiver in decisions related to nutrition  Outcome: Progressing

## 2025-02-17 NOTE — ASSESSMENT & PLAN NOTE
Patient with past medical history of seminoma of left testes, PEG tube placement, mixed axonal demyelinating polyneuropathy, history of pulmonary embolism, agitation, depression, anxiety, ventilator dependent presents with hypotension.  Patient presented later in day after having been discharged from ICU.  Patient was seen for psychiatric consultation on 2/14/2025 due to agitation and concerns of suicide-please see psychiatric consultation and notes from previous admission for more information.  Per primary team when asked if past attempts to remove trach were him trying to harm himself patient nodded his head.  Per primary team ethics consult was ordered, who are requesting reevaluation by psychiatry.      Patient was calm and cooperative with interview.  He communicated via nodding and shaking his head (see below regarding attempts to have patient write). He reported feeling depressed, anxious, angry.  He repeatedly denies wishing he were dead.  Repeatedly denies current suicidal or homicidal ideation, plan, or intent.  He repeatedly denies trying to remove the vent as a means of ending his life.  Patient denies interest in inpatient psychiatric hospitalization.  He repeatedly denied feeling that he has any supports. He shrugged his shoulders when asked if he wants to return to stay with his sister.  Patient does not meet criteria for involuntary inpatient psychiatric hospitalization.  Also given barriers with communication, I do not know how beneficial a psychiatric hospitalization would be.  Patient is amenable to medication changes to improve his mood and discussed options with patient, however subsequently on further thought patient's Seroquel was just recently increased recently on 2/14/2025 therefore recommend continuing current dosage and monitoring for therapeutic effect.      Continue medical management  Patient is not interested in inpatient psychiatric hospitalization   Patient does not meet criteria  for involuntary inpatient psychiatric commitment at this time   Continue Seroquel 200 mg qhs  Seroquel PRN agitation  Discussed with the primary team  Discussed with crisis worker  Crisis note and [place] for [facility] services screening reviewed  For after hours and weekends please contact EPIC Psychiatry Consultation role   Psychiatry to follow up as necessary  Please contact with questions and updates  Follow-up with PCP outpatient for management of psychiatric medications  Recommend patient establish care with psychiatry outpatient

## 2025-02-17 NOTE — ASSESSMENT & PLAN NOTE
S/p trach for neuromuscular disease  Chronic settings of 18/500/8/40%  There was previous concern for home vent malfunction when he was admitted on 2/13 - per sister, plan from company was to use transport ventilator until new ventilator can be supplied. However patient was hypotensive en route and re-routed back to ED, also concern for missing hook up piece to temporary transport vent which was been rectified    Unclear why hypercarbic after arrival to ED again - on SLETs transport vent during transport. ?mucus plugging/atelectasis  Initial VBG 7.23/68.3/40.5/28.4/-0.2   Repeat VBG normalized 7.37/47.9/53.6/27.6/1.9  Yesterday morning alkalotic-7.51/36.6/47.7/28.6/5.4   Episode of hypoxia to 50-60s x 2 in the ED, patient does have hx of this with intentional breath holding when he becomes agitated, also may be d/t mucus plugging to some degree  Had episodes of desaturation d/t mucus plugging yesterday   Pulmonary toilet; continue home duo-nebs and 3% nebs q6h and chest PT TID   Consider bronch for pulmonary toilet   Maintain O2 sat 92% or above   VAP preventions; chlorhexidine, PPI, HOB greater than 30 degrees

## 2025-02-17 NOTE — PHYSICAL THERAPY NOTE
PT EVALUATION     02/17/25 3450   PT Last Visit   PT Visit Date 02/17/25   Note Type   Note type Evaluation   Pain Assessment   Pain Assessment Tool Choi-Baker FACES   Hcoi-Baker FACES Pain Rating 0   Restrictions/Precautions   Other Precautions Chair Alarm;Bed Alarm;Fall Risk;Multiple lines;Cognitive  (Patient seen in ICU with PEG and trach in place, patient with extended history of undiagnosed demyelinating disease)   Home Living   Type of Home House   Home Layout Two level;Ramped entrance   Home Equipment Wheelchair-manual;Hospital bed;Mechanical lift  (nicky lift)   Additional Comments patient bed/WC bound for couple years, most recently requiring nicky lift and dependent for transfers/OOB   Prior Function   Level of [place] Needs assistance with ADLs;Needs assistance with functional mobility;Needs assistance with IADLS;Total dependent   Lives With Family   Receives Help From Family   Comments patient is dependent from home with nicky lift, PEG tube and trach/vent dependent   General   Additional Pertinent History chart reviewed, patient seen in ICU, patient is dependent from home-WC/bed bound and not requiring skilled PT services at this time. Patient admitted due to PEG tube being pulled   Family/Caregiver Present No   Cognition   Overall Cognitive Status Impaired   Arousal/Participation Cooperative   Orientation Level Unable to assess  (patient nods head yes/no but inconsistent with answers. does make eye contact to name being stated)   Following Commands Follows one step commands with increased time or repetition   Subjective   Subjective paitent nonverbal with trach   RLE Assessment   RLE Assessment   (Range of motion within functional limits, strength 3 -/5)   LLE Assessment   LLE Assessment   (Range of motion within functional limits except knee -15 degrees of extension, strength 2 -/5)   Coordination   Movements are Fluid and Coordinated 0   Coordination and Movement Description Decreased  coordination in lower extremities with movement, range of motion bed mobility   Bed Mobility   Rolling R 1  Dependent   Rolling L 1  Dependent   Additional Comments Patient is dependent from home having a Wallace lift for transfers although patient nods yes to being bedbound prior to admission   Transfers   Additional Comments Patient has been utilizing Wallace lift for dependent for transfers for over a year.  Patient is not a candidate for skilled PT/OT services at this time.  Patient has 24-hour care in the home with family   Balance   Static Sitting Zero   Activity Tolerance   Nurse Made Aware Yes   Assessment   Assessment Patient seen for Physical Therapy evaluation. Patient admitted with Chronic respiratory failure with hypoxia and hypercapnia (HCC).  Comorbidities affecting patient's physical performance include: .  Patient is dependent from home with peg tube and trach/vent dependent.  Patient has been nonambulatory for several years has a mechanical lift at home and 24-hour care from family.  Patient is dependent for ADLs and mobility therefore no skilled PT/OT services required at this time. personal factors affecting patient at time of initial evaluation include: inability to ambulate household distances, inability to navigate community distances, inability to navigate level surfaces without external assistance, inability to perform dynamic tasks in community, inability to perform caregiver support/tasks, inability to perform physical activity, limited insight into impairments, inability to perform ADLS, inability to perform IADLS , and inability to live alone. Prior to admission, patient was dependent for mobility, requiring assist for ADLS, requiring assist for IADLS, having 24 hour care , and home with family assist.  Please find objective findings from Physical Therapy assessment regarding body systems outlined above with impairments and limitations including no new functional deficits.  The Barthel Index  was used as a functional outcome tool presenting with a score of Barthel Index Score: 0 today indicating marked limitations of functional mobility and ADLS.  Patient's clinical presentation is currently stable  as seen in patient's presentation of no new functional deficits. As demonstrated by objective findings, the assigned level of complexity for this evaluation is low.The patient's AM-PAC Basic Mobility Inpatient Short Form Raw Score is 6. A Raw score of less than or equal to 16 suggests the patient may benefit from discharge to home as patient is dependent, wheelchair/bedbound requiring 24-hour care prior to admission. Please also refer to the recommendation of the Physical Therapist for safe discharge planning.   Goals   Patient Goals None stated, patient is nonverbal with trach present   STG Expiration Date   (N/A)   LTG Expiration Date   (N/A)   Discharge Recommendation   Rehab Resource Intensity Level, PT No post-acute rehabilitation needs   AM-PAC Basic Mobility Inpatient   Turning in Flat Bed Without Bedrails 1   Lying on Back to Sitting on Edge of Flat Bed Without Bedrails 1   Moving Bed to Chair 1   Standing Up From Chair Using Arms 1   Walk in Room 1   Climb 3-5 Stairs With Railing 1   Basic Mobility Inpatient Raw Score 6   Turning Head Towards Sound 3   Follow Simple Instructions 2   Low Function Basic Mobility Raw Score  11   Low Function Basic Mobility Standardized Score  16.55   [facility] Level Of Mobility   -HL Goal 2: Bed activities/Dependent transfer   -[facility] Achieved 2: Bed activities/Dependent transfer   Barthel Index   Feeding 0   Bathing 0   Grooming Score 0   Dressing Score 0   Bladder Score 0   Bowels Score 0   Toilet Use Score 0   Transfers (Bed/Chair) Score 0   Mobility (Level Surface) Score 0   Stairs Score 0   Barthel Index Score 0   Licensure   [place] License Number  Maureen PT [id number]

## 2025-02-17 NOTE — ED NOTES
2/17/25 @ 1345:  HECTOR met with Dr. Craft and provided copy of crisis screening from [facility].  Roderick MS

## 2025-02-17 NOTE — CONSULTS
Consultation - Behavioral Health   Hemanth Swanson 39 y.o. male MRN: [id number]  Unit/Bed#: ICU 01 Encounter: [id number]              Assessment & Plan  Chronic respiratory failure with hypoxia and hypercapnia (HCC)  Management per primary ICU team  Mood disorder (HCC)  Patient with past medical history of seminoma of left testes, PEG tube placement, mixed axonal demyelinating polyneuropathy, history of pulmonary embolism, agitation, depression, anxiety, ventilator dependent presents with hypotension.  Patient presented later in day after having been discharged from ICU.  Patient was seen for psychiatric consultation on 2/14/2025 due to agitation and concerns of suicide-please see psychiatric consultation and notes from previous admission for more information.  Per primary team when asked if past attempts to remove trach were him trying to harm himself patient nodded his head.  Per primary team ethics consult was ordered, who are requesting reevaluation by psychiatry.      Patient was calm and cooperative with interview.  He communicated via nodding and shaking his head (see below regarding attempts to have patient write). He reported feeling depressed, anxious, angry.  He repeatedly denies wishing he were dead.  Repeatedly denies current suicidal or homicidal ideation, plan, or intent.  He repeatedly denies trying to remove the vent as a means of ending his life.  Patient denies interest in inpatient psychiatric hospitalization.  He repeatedly denied feeling that he has any supports. He shrugged his shoulders when asked if he wants to return to stay with his sister.  Patient does not meet criteria for involuntary inpatient psychiatric hospitalization.  Also given barriers with communication, I do not know how beneficial a psychiatric hospitalization would be.  Patient is amenable to medication changes to improve his mood and discussed options with patient, however subsequently on further thought patient's Seroquel  was just recently increased recently on 2/14/2025 therefore recommend continuing current dosage and monitoring for therapeutic effect.      Continue medical management  Patient is not interested in inpatient psychiatric hospitalization   Patient does not meet criteria for involuntary inpatient psychiatric commitment at this time   Continue Seroquel 200 mg qhs  Seroquel PRN agitation  Discussed with the primary team  Discussed with crisis worker  Crisis note and [facility] screening reviewed  For after hours and weekends please contact EPIC Psychiatry Consultation role   Psychiatry to follow up as necessary  Please contact with questions and updates  Follow-up with PCP outpatient for management of psychiatric medications  Recommend patient establish care with psychiatry outpatient        Risks, benefits and possible side effects of Medications:   Risks, benefits, and possible side effects of medications explained to patient and patient nodded his head exhibiting understanding and agreement.          Chief Complaint: Did not verbally respond      History of Present Illness   Inpatient consult to Psychiatry  Consult performed by: Bashir Craft DO  Consult ordered by: AMBER Mei        Physician Requesting Consult: Kyle Cardoza MD  Reason for Consult / Principal Problem: Dx: 1.  Depression, unspecified depression type, 2. Mood disorder (HCC) 3.  Agitation 4.  Suicidal ideation 5.  Anxiety    Hemanth Swanson is a 39 y.o. male with past medical history of seminoma of left testes, PEG tube placement, mixed axonal demyelinating polyneuropathy, history of pulmonary embolism, agitation, depression, anxiety, ventilator dependent presents with hypotension.  Patient presented later in day after having been discharged from ICU.  Patient was seen for psychiatric consultation on 2/14/2025 due to agitation and concerns of suicide-please see psychiatric consultation and notes from  "previous admission for more information.  Per primary team when asked if past attempts to remove trach were him trying to harm himself patient nodded his head.  Per primary team ethics consult was ordered, who are requesting reevaluation by psychiatry.  Patient was calm and cooperative with interview.  He communicated via nodding and shaking his head. He reports feeling depressed, anxious, angry.  Denies having trouble sleeping.  Patient repeatedly denies wishing he were dead.  Repeatedly denies current suicidal or homicidal ideation, plan, or intent.  Repeatedly denies trying to remove the vent as a means of ending his life.  He reports he removed the vent out of anger and frustration.  He does not endorse current or previous episodes of emily/hypomania.  Denies auditory or visual hallucinations.  Denies drug or alcohol use.  He reports having more than 1 child.  Denies experiencing pain.  I asked if he lives with his sister and patient provided unclear response.  He repeatedly denied feeling that he has any supports.  He shrugged his shoulder when asked if he wanted to return to stay with his sister.  Attempted to have patient write answers however there initially was some difficulty setting this up - patient was writing in the same place.  After modifications patient then wrote \"no\" in regards if there was anything he wanted to let us know about.        Historical Information   Past Psychiatric History:   Unclear when asked about previous psychiatric hospitalizations  Denies  Past Suicide attempts: Denies  Past Psychiatric medication trial: Unknown.  Per chart Prozac, Seroquel      Substance Abuse History:  Denies drug or alcohol use   Social History       Tobacco History       Smoking Status  Former Smoking Start Date  1/1/2008 Quit Date  6/1/2023 Average Packs/Day  0.7 packs/day for 22.7 years (15.0 ttl pk-yrs) Smoking Tobacco Type  Cigarettes from 1/1/2008 to 6/1/2023   Pack Year History     Packs/Day From To " "Years    0 6/1/2023  1.7    0.5 1/1/2008 6/1/2023 15.4    1   7.3      Passive Exposure  Never      Smokeless Tobacco Use  Never              Alcohol History       Alcohol Use Status  Not Currently Drinks/Week  0 Glasses of wine, 0 Cans of beer, 0 Shots of liquor, 0 Standard drinks or equivalent per week Comment  Former alcoholic. Last use in 2016              Drug Use       Drug Use Status  Not Currently Types  \"Crack\" cocaine, Marijuana Comment  Current use medical marijuana. Not currently using crack cocaine.              Sexual Activity       Sexually Active  Not Currently Partners  Female Birth Control/Protection  Other              Other Factors    Not Asked                     Family Psychiatric History:   Unknown    Social History  Marital history: single per chart  Living arrangement, social support:  Lives with sister.      Past Medical History:   Diagnosis Date    Anxiety     Cancer ([facility])     Depression     Hypernatremia 10/06/2024    Migration of percutaneous endoscopic gastrostomy (PEG) tube ([facility]) 09/24/2023    Psychiatric disorder     bipolar    Sepsis ([facility]) 08/24/2023     Past Surgical History:   Procedure Laterality Date    IR BIOPSY LYMPH NODE  01/20/2023    IR GASTROSTOMY (G) TUBE CHECK/CHANGE/REINSERTION/UPSIZE  6/18/2024    IR GASTROSTOMY TUBE PLACEMENT  09/25/2023    IR LUMBAR PUNCTURE  09/06/2023    IR LUMBAR PUNCTURE  10/25/2024    IR PORT PLACEMENT  03/14/2023    ORCHIECTOMY Left 12/14/2022    Procedure: ORCHIECTOMY INGUINAL;  Surgeon: Flip Michael MD;  Location:  MAIN OR;  Service: Urology    PEG W/TRACHEOSTOMY PLACEMENT N/A 09/08/2023    Procedure: TRACHEOSTOMY WITH INSERTION PEG TUBE;  Surgeon: Rudy Mcmanus MD;  Location: [facility] MAIN OR;  Service: General    TESTICLE SURGERY             Meds/Allergies     all current active meds have been reviewed and current meds:   Current Facility-Administered Medications:     acetaminophen (TYLENOL) oral suspension 650 mg, Q6H PRN    apixaban " (ELIQUIS) tablet 5 mg, BID    chlorhexidine (PERIDEX) 0.12 % oral rinse 15 mL, Q12H ISAEL    ipratropium-albuterol (DUO-NEB) 0.5-2.5 mg/3 mL inhalation solution 3 mL, Q6H    LORazepam (ATIVAN) injection 1 mg, Q6H PRN    QUEtiapine (SEROquel) tablet 200 mg, HS    sodium chloride 3 % inhalation solution 4 mL, Q6H  Allergies   Allergen Reactions    Dog Epithelium Cough, Sneezing and Nasal Congestion       Objective     Vital signs in last 24 hours:  Temp:  [96.5 °F (35.8 °C)-97.6 °F (36.4 °C)] 96.5 °F (35.8 °C)  HR:  [] 71  BP: ()/(50-81) 147/79  Resp:  [17-27] 18  SpO2:  [51 %-99 %] 97 %  O2 Device: Ventilator  FiO2 (%):  [40] 40      Intake/Output Summary (Last 24 hours) at 2/17/2025 1507  Last data filed at 2/17/2025 0800  Gross per 24 hour   Intake 330 ml   Output 1350 ml   Net -1020 ml       Mental Status Evaluation:  Appearance:  appears stated age, bearded, laying in bed, and trach present   Behavior:  calm and cooperative   Speech:  Did not speak   Mood:  Uncertain   Affect:  Flat   Thought Process:  Unable to assess   Thought Content:  Unable to assess   Perceptual Disturbances: Denies auditory or visual hallucinations and Does not appear to be responding to internal stimuli   Risk Potential: Denies wishing he were dead.  Denies current suicidal or homicidal ideation, plan, or intent.   Sensorium:  person and place   Memory:  Unable to assess   Cognition:  Unable to assess   Consciousness:  alert and awake    Attention: Unable to assess   Insight:  Unable to fully assess, appears moderate   Judgment: moderate   Muscle Strength and Tone: Not assessed   Motor Activity: no abnormal movements     Lab Results:    Most Recent Labs:   Lab Results   Component Value Date    WBC 5.69 02/17/2025    RBC 3.15 (L) 02/17/2025    HGB 9.1 (L) 02/17/2025    HCT 29.1 (L) 02/17/2025     02/17/2025    RDW 16.0 (H) 02/17/2025    NEUTROABS 4.09 02/17/2025    SODIUM 135 02/17/2025    K 3.9 02/17/2025      02/17/2025    CO2 26 02/17/2025    BUN 13 02/17/2025    CREATININE 0.38 (L) 02/17/2025    GLUC 81 02/17/2025    GLUF 89 09/26/2023    CALCIUM 8.9 02/17/2025    AST 13 02/15/2025    ALT 15 02/15/2025    ALKPHOS 98 02/15/2025    TP 7.1 02/15/2025    ALB 3.4 (L) 02/15/2025    TBILI 0.45 02/15/2025    CHOLESTEROL 183 12/10/2022    HDL 45 12/10/2022    TRIG 280 (H) 12/10/2022    LDLCALC 82 12/10/2022    NONHDLC 138 12/10/2022    AMMONIA 19 06/10/2024    IDI3XESQWBPR 2.090 12/16/2024    PREGSERUM Negative 10/11/2024    HGBA1C 5.3 11/01/2023     11/01/2023     Labs in last 72 hours:   Recent Labs     02/15/25  1626 02/16/25  0602 02/17/25  0544   WBC 14.97*   < > 5.69   RBC 3.49*   < > 3.15*   HGB 10.1*   < > 9.1*   HCT 32.4*   < > 29.1*      < > 169   RDW 16.7*   < > 16.0*   NEUTROABS 13.08*   < > 4.09   SODIUM 135   < > 135   K 3.9   < > 3.9   CL 99   < > 100   CO2 27   < > 26   BUN 12   < > 13   CREATININE 0.45*   < > 0.38*   GLUC 74   < > 81   CALCIUM 8.9   < > 8.9   AST 13  --   --    ALT 15  --   --    ALKPHOS 98  --   --    TP 7.1  --   --    ALB 3.4*  --   --    TBILI 0.45  --   --     < > = values in this interval not displayed.     EKG   Lab Results   Component Value Date    VENTRATE 121 02/15/2025    ATRIALRATE 121 02/15/2025    PRINT 160 02/15/2025    QRSDINT 82 02/15/2025    QTINT 322 02/15/2025    QTCINT 457 02/15/2025    PAXIS 13 02/15/2025    QRSAXIS 63 02/15/2025    TWAVEAXIS -13 02/15/2025       Imaging Studies:   EKG/Pathology/Other Studies:   Lab Results   Component Value Date    VENTRATE 121 02/15/2025    ATRIALRATE 121 02/15/2025    PRINT 160 02/15/2025    QRSDINT 82 02/15/2025    QTINT 322 02/15/2025    QTCINT 457 02/15/2025    PAXIS 13 02/15/2025    QRSAXIS 63 02/15/2025    TWAVEAXIS -13 02/15/2025        Code Status: Level 1 - Full Code  Advance Directive and Living Will:      Power of : Yes  POLST:            Bashir Craft DO  02/17/25      This note has been constructed  using a voice recognition system.    There may be translation, syntax,  or grammatical errors. If you have any questions, please contact the dictating provider.

## 2025-02-18 VITALS
SYSTOLIC BLOOD PRESSURE: 135 MMHG | HEIGHT: 76 IN | BODY MASS INDEX: 27.76 KG/M2 | OXYGEN SATURATION: 95 % | TEMPERATURE: 98.8 F | RESPIRATION RATE: 28 BRPM | DIASTOLIC BLOOD PRESSURE: 69 MMHG | WEIGHT: 227.96 LBS | HEART RATE: 98 BPM

## 2025-02-18 PROBLEM — R65.10 SIRS (SYSTEMIC INFLAMMATORY RESPONSE SYNDROME) (HCC): Status: RESOLVED | Noted: 2025-02-15 | Resolved: 2025-02-18

## 2025-02-18 PROCEDURE — 94668 MNPJ CHEST WALL SBSQ: CPT

## 2025-02-18 PROCEDURE — 94150 VITAL CAPACITY TEST: CPT

## 2025-02-18 PROCEDURE — 94640 AIRWAY INHALATION TREATMENT: CPT

## 2025-02-18 PROCEDURE — 99238 HOSP IP/OBS DSCHRG MGMT 30/<: CPT | Performed by: ANESTHESIOLOGY

## 2025-02-18 PROCEDURE — 94669 MECHANICAL CHEST WALL OSCILL: CPT

## 2025-02-18 PROCEDURE — NC001 PR NO CHARGE

## 2025-02-18 PROCEDURE — 94003 VENT MGMT INPAT SUBQ DAY: CPT

## 2025-02-18 PROCEDURE — 94760 N-INVAS EAR/PLS OXIMETRY 1: CPT

## 2025-02-18 RX ORDER — QUETIAPINE FUMARATE 200 MG/1
200 TABLET, FILM COATED ORAL
Qty: 30 TABLET | Refills: 0 | Status: SHIPPED | OUTPATIENT
Start: 2025-02-18

## 2025-02-18 RX ADMIN — CHLORHEXIDINE GLUCONATE 15 ML: 1.2 SOLUTION ORAL at 08:22

## 2025-02-18 RX ADMIN — SODIUM CHLORIDE SOLN NEBU 3% 4 ML: 3 NEBU SOLN at 01:07

## 2025-02-18 RX ADMIN — IPRATROPIUM BROMIDE AND ALBUTEROL SULFATE 3 ML: .5; 3 SOLUTION RESPIRATORY (INHALATION) at 07:09

## 2025-02-18 RX ADMIN — SODIUM CHLORIDE SOLN NEBU 3% 4 ML: 3 NEBU SOLN at 07:09

## 2025-02-18 RX ADMIN — IPRATROPIUM BROMIDE AND ALBUTEROL SULFATE 3 ML: .5; 3 SOLUTION RESPIRATORY (INHALATION) at 01:07

## 2025-02-18 RX ADMIN — IPRATROPIUM BROMIDE AND ALBUTEROL SULFATE 3 ML: .5; 3 SOLUTION RESPIRATORY (INHALATION) at 13:11

## 2025-02-18 RX ADMIN — APIXABAN 5 MG: 5 TABLET, FILM COATED ORAL at 08:22

## 2025-02-18 RX ADMIN — SODIUM CHLORIDE SOLN NEBU 3% 4 ML: 3 NEBU SOLN at 13:11

## 2025-02-18 NOTE — RESPIRATORY THERAPY NOTE
January 24, 2023      [facility]  [address], [address]  [place] [address]  Phone: [phone]  Fax: [phone]       Patient: Kim Mares   YOB: 1978  Date of Visit: 01/24/2023    To Whom It May Concern:    Karl Mares  was at [facility] on 01/24/2023. The patient may return to work/school on 1/27/2023 with no restrictions if symptoms have improved. If you have any questions or concerns, or if I can be of further assistance, please do not hesitate to contact me.    Sincerely,    Keri Pérez NP      Received patient on S(CMV) 18/420/35% +6 tolerating therapy.  Bed therapy and breathing treatment given.  Restraints secure and ventilator is plugged into RED outlet.

## 2025-02-18 NOTE — PROGRESS NOTES
Progress Note - Critical Care/ICU   Name: Hemanth Swanson 39 y.o. male I MRN: [id number]  Unit/Bed#: ICU 01 I Date of Admission: 2/15/2025   Date of Service: 2/18/2025 I Hospital Day: 0       Interval Events:   Patient de-saturated to sp02 in 70's. RN went bedside to perform in-line suctioning. Moderate amount of secretions re-turned. Patient further de-saturated to 20%. I performed in-line lavage and additional suctioning. Fi02 was increased to 100% and patient ultimately re-bounded to 100% sp02.     Assessment and Plan  Chronic hypoxic and hypercapnic respiratory failure  Continue pulmonary hygiene   Continue current neb treatments  Continue to reinforce concerns to family about discharging home    Billing Level:  Critical Care Time Statement: Upon my evaluation, this patient had a high probability of imminent or life-threatening deterioration due to hypoxic respiratory failure, which required my direct attention, intervention, and personal management.  I spent a total of 15 minutes directly providing critical care services, including evaluating for the presence of life-threatening injuries or illnesses, management of organ system failure(s) , and ventilator management. This time is exclusive of procedures, teaching, family meetings, and any prior time recorded by providers other than myself.      AMBER Mei

## 2025-02-18 NOTE — CASE MANAGEMENT
Case Management Assessment & Discharge Planning Note    Patient name Hemanth Swanson  Location ICU ICU  MRN [id number]  : 1985 Date 2025       Current Admission Date: 2/15/2025  Current Admission Diagnosis:Chronic respiratory failure with hypoxia and hypercapnia (HCC)   Patient Active Problem List    Diagnosis Date Noted Date Diagnosed    Mood disorder (HCC) 2024     Abnormal movements 2024     Agitation 2024     History of pulmonary embolus (PE) 2024     Rash of back 10/25/2024     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HCC) 10/17/2024     Palliative care by specialist 10/11/2024     Cardiac arrest due to other underlying condition ([facility]) 2024     Seizure-like activity ([facility]) 2024     Ventilator dependent ([facility]) 2024     Obesity hypoventilation syndrome ([facility]) 10/24/2023     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     Impaired mobility 2023     Status post tracheostomy ([facility]) 2023     S/P percutaneous endoscopic gastrostomy (PEG) tube placement ([facility]) 2023     Anxiety 2023     Chronic respiratory failure with hypoxia and hypercapnia ([facility]) 2023     Depression 2023     History of LVH (left ventricular hypertrophy) 2023     Pure hypertriglyceridemia 2023     Unilateral vestibular schwannoma ([facility]) 2023     Port-A-Cath in place 2023     Diplopia 2023     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HCC) 2023     Umbilical hernia without obstruction and without gangrene 12/10/2022     Tobacco use 12/10/2022     Retroperitoneal lymphadenopathy 12/10/2022       LOS (days): 0  Geometric Mean LOS (GMLOS) (days):   Days to GMLOS:     OBJECTIVE:     Current admission status: Observation    Preferred Pharmacy:   [facility]/pharmacy #[id number]  RAFAEL TAVERAS [address] [address]  Phone: [phone] Fax: [phone]    Primary Care Provider: Ela Douglas MD    Primary Insurance: [facility]  Secondary Insurance:     ASSESSMENT:  Active Health Care Proxies    There are no active Health Care Proxies on file.       Advance Directives  Primary Contact: Dmitry Harrismanueltristongwendolyn    Patient Information  Admitted from:: Home  Mental Status: Alert, Other (Comment) (chronic vent/trach)  During Assessment patient was accompanied by: Not accompanied during assessment  Assessment information provided by:: Sister  Primary Caregiver: Family  Caregiver's Name:: Dmitry Swanson  Caregiver's Relationship to Patient:: Family Member (sister)  Caregiver's Telephone Number:: [phone]  Support Systems: Family members  County of Residence: [place]  What city do you live in[place]  Home entry access options. Select all that apply.: Ramp  Type of Current Residence: 2 story home  Upon entering residence, is there a bedroom on the main floor (no further steps)?: Yes  Upon entering residence, is there a bathroom on the main floor (no further steps)?: Yes  Living Arrangements: Lives w/ Family members    Activities of Daily Living Prior to Admission  Functional Status: Total dependent  Completes ADLs independently?: No  Level of ADL dependence: Total Dependent  Ambulates independently?: No  Level of ambulatory dependence: Total Dependent  Does patient use assisted devices?: Yes  Assisted Devices (DME) used: Wallace lift, Other (Comment), Home Oxygen concentrator, Portable Oxygen tanks, Hospital Bed, Nebulizer, Wheelchair (vent, heater for vent, suction machine, tube feed supplies, trach supplies, alternating pressure mattress, lift recliner, percussion wrap)  DME Company Name (respiratory supplies): [employer]  Does patient currently own DME?: Yes  What DME does the patient currently own?: Wallace lift, Home Oxygen concentrator, Portable Oxygen tanks, Hospital Bed, Nebulizer, Wheelchair, Other (Comment) (vent, heater for vent, suction machine, tube feed supplies, trach supplies, alternating pressure mattress, lift  "recliner, percussion wrap)  Does patient have a history of Outpatient Therapy (PT/OT)?: No  Does the patient have a history of Short-Term Rehab?: No  Does patient have a history of HHC?: Yes  Does patient currently have HHC?: Yes    Current Home Health Care  Type of Current Home Care Services: Home PT, Home OT  Home Health Agency Name:: [facility]  Current Home Health Follow-Up Provider:: PCP    Patient Information Continued  Income Source: SSI/SSD  Does patient have prescription coverage?: Yes  Does patient have a history of Mental Health Diagnosis?: Yes (Depression, anxiety)  Is patient receiving treatment for mental health?: Yes (PCP)    Means of Transportation  Means of Transport to Appts:: Other (Comment) (medical transport)      DISCHARGE DETAILS:    Discharge planning discussed with:: sister Wayne  Freedom of Choice: Yes  Comments - Freedom of Choice: SAHIL joined Dr. Cardoza in talking with pt's sister over phone about discharge recommendations and plans. Pt was cleared by psychiatrist to return home after evaluation.  Per sister plan is for pt to return home with family and despite recommendation for placement sister remained adamant that placement is not an option.  SAHIL confirmed with sister that all pt's DME is at house.  Per sister Charley came out \"on her day off\" over the weekend to make sure everything was delivered.  Sister said they are anticipating delivery of a different percussion vest for pt, one that clips on instead of a wrap.  Sister is planning on following up with Charley.  The wrap vest is available for pt at home at this time.  Sister requested referral be made back to [facility] for the therapy services they were providing prior to admission.  Referral has been made as requested.  Sister aware of plan for pt to return home today.  SCTU transportation will be arranged and sister will be notified of transport time when confirmed.  No other discharge needs expressed by " sister.  SW will follow.  CM contacted family/caregiver?: Yes  Were Treatment Team discharge recommendations reviewed with patient/caregiver?: Yes  Did patient/caregiver verbalize understanding of patient care needs?: Yes  Were patient/caregiver advised of the risks associated with not following Treatment Team discharge recommendations?: Yes    Contacts  Patient Contacts: Dmitry Wilburnkristyn (sister)  Relationship to Patient:: Family  Contact Method: Phone  Phone Number: [phone]  Reason/Outcome: Continuity of Care, Discharge Planning    Requested Home Health Care         Is the patient interested in HHC at discharge?: Yes  Home Health Discipline requested:: Physical Therapy, Occupational Therapy, Nursing  Home Health Agency Name:: [facility]  HHA External Referral Reason (only applicable if external HHA name selected): Patient has established relationship with provider  Home Health Follow-Up Provider:: PCP  Home Health Services Needed:: Evaluate Functional Status and Safety, Strengthening/Theraputic Exercises to Improve Function, Other (comment) (Vent/Trach care; tube feedings)  Homebound Criteria Met:: Requires the Assistance of Another Person for Safe Ambulation or to Leave the Home, Uses an Assist Device (i.e. cane, walker, etc)  Supporting Clincal Findings:: Bed Bound or Wheelchair Bound, Requires Oxygen (Chronic vent)    DME Referral Provided  Referral made for DME?: No    Other Referral/Resources/Interventions Provided:  Interventions: HHC  Referral Comments: Referral made to [facility] as requested by sister.  AVS and F2F will be faxed when available.    Treatment Team Recommendation: SNF, Other (Vent facility)  Discharge Destination Plan:: Home with Home Health Care  Transport at Discharge : Other (Comment) (SCTU)

## 2025-02-18 NOTE — CASE MANAGEMENT
Case Management Discharge Planning Note    Patient name Hemanth Swanson  Location ICU ICU  MRN [id number]  : 1985 Date 2025       Current Admission Date: 2/15/2025  Current Admission Diagnosis:Chronic respiratory failure with hypoxia and hypercapnia (HCC)   Patient Active Problem List    Diagnosis Date Noted Date Diagnosed    Mood disorder (HCC) 2024     Abnormal movements 2024     Agitation 2024     History of pulmonary embolus (PE) 2024     Rash of back 10/25/2024     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HCC) 10/17/2024     Palliative care by specialist 10/11/2024     Cardiac arrest due to other underlying condition ([facility]) 2024     Seizure-like activity ([facility]) 2024     Ventilator dependent ([facility]) 2024     Obesity hypoventilation syndrome ([facility]) 10/24/2023     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     Impaired mobility 2023     Status post tracheostomy ([facility]) 2023     S/P percutaneous endoscopic gastrostomy (PEG) tube placement ([facility]) 2023     Anxiety 2023     Chronic respiratory failure with hypoxia and hypercapnia ([facility]) 2023     Depression 2023     History of LVH (left ventricular hypertrophy) 2023     Pure hypertriglyceridemia 2023     Unilateral vestibular schwannoma (HCC) 2023     Port-A-Cath in place 2023     Diplopia 2023     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HCC) 2023     Umbilical hernia without obstruction and without gangrene 12/10/2022     Tobacco use 12/10/2022     Retroperitoneal lymphadenopathy 12/10/2022       LOS (days): 0  Geometric Mean LOS (GMLOS) (days):   Days to GMLOS:     OBJECTIVE:     Current admission status: Observation   Preferred Pharmacy:   [facility]/pharmacy #[id number]  RAFAEL TAVERAS [address] [address]  Phone: [phone] Fax: [phone]    Primary Care Provider: Ela Douglas MD    Primary Insurance: [facility]  [employer]  Secondary Insurance:     DISCHARGE DETAILS:    Other Referral/Resources/Interventions Provided:  Interventions: [facility]  Referral Comments: Response received from [facility] confirming plan to resume care.  Agency requested confirmation of discharge.  SW confirmed plan to discharge today and will send AVS and F2F to agency when available.  Agency will be reserved in Aidin.    Treatment Team Recommendation: SNF (Vent facility)  Discharge Destination Plan:: Home with Home Health Care  Transport at Discharge : Other (Comment) (SCTU)     Number/Name of Dispatcher: Roundtrip  Transported by (Company and Unit #): [employer]  ETA of Transport (Date): 02/18/25  ETA of Transport (Time): 1500 (confirmed)

## 2025-02-18 NOTE — PROGRESS NOTES
Patient:  ANNABEL MIKE    MRN:  [id number]    Aidin Request ID:  [id number]    Level of care reserved:  Home Health Agency    Partner Reserved:  [facility] [place] [address] [phone]    Clinical needs requested:    Geography searched:  [id number]    Start of Service:    Request sent:  12:11pm EST on 2/18/2025 by Kirsty Elizondo    Partner reserved:  2:37pm EST on 2/18/2025 by Kirsty Elizondo    Choice list shared:  2:34pm EST on 2/18/2025 by Kirsty Elizondo

## 2025-02-18 NOTE — CASE MANAGEMENT
Case Management Discharge Planning Note    Patient name Hemanth Swanson  Location ICU ICU  MRN [id number]  : 1985 Date 2025       Current Admission Date: 2/15/2025  Current Admission Diagnosis:Chronic respiratory failure with hypoxia and hypercapnia (HCC)   Patient Active Problem List    Diagnosis Date Noted Date Diagnosed    Mood disorder (HCC) 2024     Abnormal movements 2024     Agitation 2024     History of pulmonary embolus (PE) 2024     Rash of back 10/25/2024     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HCC) 10/17/2024     Palliative care by specialist 10/11/2024     Cardiac arrest due to other underlying condition ([facility]) 2024     Seizure-like activity ([facility]) 2024     Ventilator dependent ([facility]) 2024     Obesity hypoventilation syndrome ([facility]) 10/24/2023     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     Impaired mobility 2023     Status post tracheostomy ([facility]) 2023     S/P percutaneous endoscopic gastrostomy (PEG) tube placement ([facility]) 2023     Anxiety 2023     Chronic respiratory failure with hypoxia and hypercapnia ([facility]) 2023     Depression 2023     History of LVH (left ventricular hypertrophy) 2023     Pure hypertriglyceridemia 2023     Unilateral vestibular schwannoma (HCC) 2023     Port-A-Cath in place 2023     Diplopia 2023     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HCC) 2023     Umbilical hernia without obstruction and without gangrene 12/10/2022     Tobacco use 12/10/2022     Retroperitoneal lymphadenopathy 12/10/2022       LOS (days): 0  Geometric Mean LOS (GMLOS) (days):   Days to GMLOS:     OBJECTIVE:     Current admission status: Observation   Preferred Pharmacy:   [facility]/pharmacy #[id number]  RAFAEL TAVERAS [address] [address]  Phone: [phone] Fax: [phone]    Primary Care Provider: Ela Douglas MD    Primary Insurance: [facility]  [employer]  Secondary Insurance:     DISCHARGE DETAILS:    CM contacted family/caregiver?: Yes    Contacts  Patient Contacts: Dmitry Swanson (sister)  Relationship to Patient:: Family  Contact Method: Phone  Phone Number: [phone]  Reason/Outcome: Discharge Planning    Other Referral/Resources/Interventions Provided:  Interventions: Transportation, C  Referral Comments: Discharge planned.  Pt will be returning home with family.  SCTU transport has been arranged for 3:00 pm. SW placed call to pt's sister to confirm transportation time.  [facility] has viewed referral per Aidin.  Will continue to monitor Aidin site for acceptance.    Treatment Team Recommendation: SNF (Vent facility)  Discharge Destination Plan:: Home with Home Health Care  Transport at Discharge : Other (Comment) (SCTU)    Number/Name of Dispatcher: Roundtrip  Transported by (Company and Unit #): [employer]  ETA of Transport (Date): 02/18/25  ETA of Transport (Time): 1500 (confirmed)

## 2025-02-18 NOTE — DISCHARGE INSTR - OTHER ORDERS
Wound Care Plan:    1-Cleanse wound to left 1st, 2nd and 3rd fingers with mild soap and water & pat dry. Apply thin layer of Normlgel to wounds, Adaptic cut to size of wounds, gauze, rolled gauze and tape. Change every other day and as needed for soilage/dislodgement.  2-Cleanse wound to left inner ear with warm water, gauze and pat dry. Apply 3M No Sting daily for protection.  3-Cleanse feeding tube site with warm water, gauze and pat dry. Apply split gauze and secure with gentle paper tape. Change daily & as needed for soilage/dislodgement.  4-Apply Prevent barrier cream or equivalent to bilateral sacrum, buttock and heels 2x/day and as needed.  5-Float heels on 2 pillows to offload pressure so heels are not in contact with mattress or pillows OR apply heel lift boots.  6-Use pressure redistribution cushion in chair when out of bed if able. Avoid prolonged sitting.  7-Moisturize skin daily with skin nourishing cream.  8-Turn/reposition every 2 hrs in bed and every hour when out of bed to chair for pressure re-distribution on skin.

## 2025-02-19 NOTE — UTILIZATION REVIEW
Initial Clinical Review    Admission: Date/Time/Statement:   Admission Orders (From admission, onward)       Ordered        02/15/25 2134  Place in Observation  Once                          Orders Placed This Encounter   Procedures    Place in Observation     Standing Status:   Standing     Number of Occurrences:   1     Level of Care:   Critical Care [15]     ED Arrival Information       Expected   -    Arrival   2/15/2025 15:52    Acuity   Urgent              Means of arrival   Ambulance    Escorted by   [facility]    Service   Critical Care/ICU    Admission type   Emergency              Arrival complaint   -             Chief Complaint   Patient presents with    Hypotension     Pt arrives with [facility] medic, he was transported home after being d.c from [place] ICU. Reported that pt became hypotensive and diaphoresis.  Pt has Vent. Non verbal        Initial Presentation:   38 y/o M with demyelinating polyneuropathy, peg/trach dependent, recent ICU admission for ventilator management returns to ED quickly after ICU discharge this morning for evaluation of hypotention. Per EMS who was transporting patient home, patient had back to back low blood pressure readings first 90s systolic then 80s. Received 250ml bolus per them and BP now 120s systolic. Pt then with episode desaturated to 50%. Disconnected from vent and bagged through trach up to 100%. RT put back on vent now with no further issues, unclear issue, seemed like the vent was not delivering breaths. Patient does have hx of this with intentional breath holding when he becomes agitated, also may be d/t mucus plugging to some degree. Hx  mixed axonal demyelinating polyneuropathy, trach/vent dependent, PEG tube in place, and history of PE on Eliquis. Presents as above. Admission Initial VBG 7.37/68.3/40.5/28.4/-0.2. Repeat VBG normalized 7.37/47.9/53.6/27.6/1.9.   Placed in observation status for chronic respiratory failure with hypoxia & hypercapnia. Plan:  Pulmonary toilet; continue home duo-nebs q6h, add 3% nebs q6h and chest PT TID. Maintain O2 sat 92% or above. VAP preventions; chlorhexidine, PPI, HOB greater than 30 degrees    2/16/25- observation:  Pt calm and cooperative since arrival to ICU. No desaturation or hypotensive episodes.   Neuro: lethargic and sleepy at present; monitor neurostatus: psychiatry consultation appreciated; increased seroquel   Resp: decreased breath sound at bases. On vent support: hypercapnia has resolved:lower Vt to 420 as pt noticed to be in respiratory alkalosis.     2/17/25- observation:  Episodes of desaturation associated with mucus plugging yesterday. Also with intermittent episodes of intense diaphoresis. Pulm - Coarse bilateral breath sounds. Symmetric chest excursion.     ED Treatment-Medication Administration from 02/15/2025 1552 to 02/15/2025 2236         Date/Time Order Dose Route Action     02/15/2025 1640 LORazepam (ATIVAN) injection 1 mg 1 mg Intravenous Given     02/15/2025 2111 QUEtiapine (SEROquel) tablet 150 mg 150 mg Per PEG Tube Given     02/15/2025 2225 ipratropium-albuterol (DUO-NEB) 0.5-2.5 mg/3 mL inhalation solution 3 mL 3 mL Nebulization Given     02/15/2025 2225 sodium chloride 3 % inhalation solution 4 mL 4 mL Nebulization Given     02/15/2025 2156 multi-electrolyte (ISOLYTE-S PH 7.4) bolus 500 mL 500 mL Intravenous New Bag            Scheduled Medications:  apixaban, 5 mg, Per PEG Tube, BID  chlorhexidine, 15 mL, Mouth/Throat, Q12H ISAEL  ipratropium-albuterol, 3 mL, Nebulization, Q6H  pantoprazole, 40 mg, Intravenous, Q24H ISAEL  potassium chloride, 40 mEq, Oral, Once  QUEtiapine, 200 mg, Per PEG Tube, HS  sodium chloride, 4 mL, Nebulization, Q6H    PRN Meds:  acetaminophen, 650 mg, Per NG Tube, Q6H PRN  LORazepam, 1 mg, Intravenous, Q6H PRN      ED Triage Vitals   Temperature Pulse Respirations Blood Pressure SpO2 Pain Score   02/15/25 1557 02/15/25 1557 02/15/25 1557 02/15/25 1557 02/15/25 1558 02/15/25  2245   98.3 °F (36.8 °C) (!) 122 18 128/59 97 % No Pain     Weight (last 2 days) before discharge       Date/Time Weight    02/18/25 0533 103 (227.96)    02/16/25 0600 105 (230.82)            Vital Signs (last 3 days) before discharge       Date/Time Temp Pulse Resp BP MAP (mmHg) SpO2 FiO2 (%) O2 Device Patient Position - Orthostatic VS [place] Coma Scale Score Pain    02/18/25 1311 -- -- -- -- -- 95 % -- -- -- -- --    02/18/25 1230 -- 98 28 135/69 96 96 % -- -- -- -- --    02/18/25 1200 -- -- -- -- -- -- -- -- -- 11 --    02/18/25 1147 98.8 °F (37.1 °C) -- -- -- -- -- -- -- -- -- --    02/18/25 1117 -- -- -- -- -- 98 % -- -- -- -- --    02/18/25 1000 -- 80 24 136/96 111 98 % -- -- -- -- --    02/18/25 0800 97.6 °F (36.4 °C) 75 19 131/83 103 96 % -- Ventilator Lying 11 No Pain    02/18/25 0749 -- 76 18 -- -- 97 % -- -- -- -- --    02/18/25 0709 -- -- -- -- -- 98 % -- -- -- -- --    02/18/25 0600 -- 68 19 154/95 120 99 % -- -- -- -- --    02/18/25 0400 97.5 °F (36.4 °C) 68 26 137/75 99 99 % -- -- -- -- No Pain    02/18/25 0304 -- -- -- -- -- 98 % -- -- -- -- --    02/18/25 0300 -- 65 19 108/68 84 98 % -- -- -- -- --    02/18/25 0200 -- 68 21 100/61 76 97 % -- -- -- -- --    02/18/25 0100 -- 63 21 104/65 80 99 % -- -- -- -- --    02/18/25 0000 97.5 °F (36.4 °C) 64 19 102/67 79 99 % -- -- -- -- No Pain    02/17/25 2300 -- 70 17 95/56 71 98 % -- -- -- -- --    02/17/25 2200 -- 76 13 100/66 78 97 % -- -- -- -- --    02/17/25 2100 -- 69 16 100/55 72 98 % -- -- -- -- --    02/17/25 2007 97 °F (36.1 °C) -- -- -- -- -- -- -- -- -- --    02/17/25 2000 -- 66 20 149/92 116 99 % -- -- -- -- No Pain    02/17/25 1923 -- -- -- -- -- 99 % 40 Ventilator -- -- --    02/17/25 1800 -- 70 18 95/58 71 97 % -- -- -- -- --    02/17/25 1700 -- 83 18 103/68 81 96 % -- -- -- -- --    02/17/25 1600 -- -- -- -- -- -- -- -- -- 11 --    02/17/25 1532 -- -- -- -- -- 100 % -- -- -- -- --    02/17/25 1527 97.5 °F (36.4 °C) -- -- -- -- -- -- -- -- --  --    02/17/25 1500 -- 74 21 147/100 118 98 % -- -- -- -- --    02/17/25 1400 -- 97 22 109/70 83 96 % -- -- -- -- --    02/17/25 1313 -- -- -- -- -- 97 % -- -- -- -- --    02/17/25 1200 98.7 °F (37.1 °C) 71 18 147/79 107 99 % 40 Ventilator Lying 11 --    02/17/25 1139 -- -- -- -- -- 99 % -- -- -- -- --    02/17/25 1100 -- 78 19 95/54 70 98 % -- -- -- -- --    02/17/25 1000 -- 85 22 135/81 96 98 % -- -- -- -- --    02/17/25 0900 -- 75 22 126/78 96 98 % -- -- -- -- --    02/17/25 0809 -- 73 18 -- -- 97 % -- -- -- -- --    02/17/25 0800 97.6 °F (36.4 °C) 69 18 98/63 76 98 % 40 Ventilator Lying 11 --    02/17/25 0748 -- -- -- -- -- 97 % -- -- -- -- --    02/17/25 0721 96.6 °F (35.9 °C) -- -- -- -- -- -- -- -- -- --    02/17/25 0700 -- 74 18 91/52 65 97 % -- -- -- -- --    02/17/25 0600 -- 74 22 138/74 99 99 % -- -- -- -- --    02/17/25 0500 -- 65 18 97/59 73 98 % -- -- -- -- --    02/17/25 0430 -- -- -- -- -- -- -- -- -- 11 --    02/17/25 0409 97.5 °F (36.4 °C) 69 18 91/53 66 95 % -- -- -- -- --    02/17/25 0400 -- 69 18 91/53 66 94 % -- -- -- -- --    02/17/25 0327 -- -- -- -- -- 97 % -- -- -- -- --    02/17/25 0300 -- 72 18 95/54 69 93 % -- -- -- -- --    02/17/25 0200 -- 72 18 88/51 64 93 % -- -- -- -- --    02/17/25 0100 -- 69 18 98/53 68 97 % -- -- -- -- --    02/17/25 0030 -- -- -- -- -- -- -- -- -- 11 --    02/17/25 0000 96.9 °F (36.1 °C) 73 23 97/59 73 98 % -- -- -- -- --    02/16/25 2300 -- 72 22 92/50 66 98 % -- -- -- -- --    02/16/25 2100 -- 104 25 129/66 90 99 % -- -- -- -- --    02/16/25 2015 -- -- -- -- -- -- -- -- -- 11 --    02/16/25 2000 97.6 °F (36.4 °C) 92 18 138/72 96 98 % 40 Ventilator Lying -- --    02/16/25 1932 -- -- -- -- -- 97 % 40 Ventilator -- -- --    02/16/25 1900 -- 102 27 106/62 77 51 % -- -- -- -- --    02/16/25 1800 -- 97 17 108/66 82 97 % -- -- -- -- --    02/16/25 1600 -- 95 27 122/81 97 98 % -- -- -- 11 No Pain    02/16/25 1500 97.5 °F (36.4 °C) -- -- -- -- 98 % -- -- -- -- --     02/16/25 1355 -- -- -- -- -- 95 % -- Ventilator -- -- --    02/16/25 1200 -- -- -- -- -- -- -- -- -- 11 --    02/16/25 1022 -- -- -- -- -- 95 % -- -- -- -- --    02/16/25 0800 97.7 °F (36.5 °C) 70 18 105/55 75 97 % -- -- -- 11 No Pain    02/16/25 0746 -- 69 18 -- -- 97 % -- -- -- -- --    02/16/25 0700 -- 67 18 121/68 87 97 % -- -- -- -- --    02/16/25 0600 -- 74 18 97/60 73 97 % -- -- -- -- --    02/16/25 0500 -- 70 18 95/61 72 98 % -- -- -- -- --    02/16/25 0420 97 °F (36.1 °C) 73 -- -- -- 97 % -- -- -- -- --    02/16/25 0400 -- 70 18 100/68 80 96 % 60 Ventilator Lying 10 No Pain    02/16/25 0328 -- -- -- -- -- 96 % -- -- -- -- --    02/16/25 0300 -- 74 18 96/63 74 95 % -- -- -- -- --    02/16/25 0200 -- 77 18 94/57 69 93 % -- -- -- -- --    02/16/25 0137 -- -- -- -- -- 95 % -- -- -- -- --    02/16/25 0100 -- 74 18 102/58 75 95 % -- -- -- -- --    02/16/25 0000 98.2 °F (36.8 °C) 75 22 119/56 81 99 % 60 Ventilator Lying -- No Pain    02/15/25 2332 -- -- -- -- -- -- -- -- -- 10 No Pain    02/15/25 2300 -- 83 32 102/73 83 99 % -- -- -- -- --    02/15/25 2245 97.6 °F (36.4 °C) 89 28 101/76 82 99 % 60 Ventilator Lying -- No Pain    02/15/25 2230 -- -- -- -- -- 100 % -- -- -- -- --    02/15/25 1930 -- 84 18 120/63 86 98 % -- -- -- -- --    02/15/25 1900 -- 90 18 111/64 81 98 % -- -- -- -- --    02/15/25 1845 -- 95 18 94/51 71 98 % -- -- -- -- --    02/15/25 1800 -- 123 20 127/58 84 99 % -- -- -- -- --    02/15/25 1739 -- 112 18 117/59 -- 99 % -- None (Room air) Lying -- --    02/15/25 1730 -- 114 23 111/88 93 99 % -- Ventilator -- -- --    02/15/25 1715 -- 118 20 112/59 80 98 % -- -- -- -- --    02/15/25 1645 -- 124 18 111/57 -- 98 % -- -- -- -- --    02/15/25 1642 -- -- -- -- -- -- -- -- -- 10 --    02/15/25 1641 -- -- -- -- -- -- 60 Ventilator -- -- --    02/15/25 1605 -- -- -- -- -- -- 60 Ventilator -- -- --    02/15/25 1558 -- -- -- -- -- 97 % -- -- -- -- --    02/15/25 1557 98.3 °F (36.8 °C) 122 18 128/59 -- -- --  Ventilator Lying -- --    02/15/25 1555 -- -- -- -- -- -- -- -- -- 10 --              Pertinent Labs/Diagnostic Test Results:   Radiology:  XR chest 1 view portable   ED Interpretation by Jay rPieto MD (02/15 7259)   Trach well positioned, no acute findings compared to previous film per my interpretation       Final Interpretation by Joel Clement MD (02/16 0915)      Low lung volumes. Diffuse lung opacities may represent edema versus pneumonia. Small bilateral pleural effusions.            Workstation performed: [id number]           Cardiology:  ECG 12 lead   Final Result by Marck Ignacio MD (02/16 1029)   Sinus tachycardia   Inferior infarct , age undetermined   Abnormal ECG   Confirmed by Marck Ignacio ([id number]) on 2/16/2025 10:29:32 AM        GI:  No orders to display           Results from last 7 days   Lab Units 02/17/25  0544 02/16/25  0602 02/15/25  1626 02/15/25  0527 02/14/25  0458   WBC Thousand/uL 5.69 9.28 14.97* 16.48* 4.70   HEMOGLOBIN g/dL 9.1* 9.3* 10.1* 9.7* 9.8*   HEMATOCRIT % 29.1* 28.5* 32.4* 30.2* 31.0*   PLATELETS Thousands/uL 169 191 218 213 207   TOTAL NEUT ABS Thousands/µL 4.09 7.62 13.08*  --  2.85         Results from last 7 days   Lab Units 02/17/25  0544 02/16/25  0602 02/15/25  1626 02/15/25  0527 02/14/25  0458   SODIUM mmol/L 135 135 135 135 139   POTASSIUM mmol/L 3.9 3.5 3.9 4.4 2.8*   CHLORIDE mmol/L 100 99 99 96 96   CO2 mmol/L 26 23 27 24 27   ANION GAP mmol/L 9 13 9 15* 16*   BUN mg/dL 13 13 12 12 15   CREATININE mg/dL 0.38* 0.31* 0.45* 0.41* 0.32*   EGFR ml/min/1.73sq m 154 168 144 150 166   CALCIUM mg/dL 8.9 8.7 8.9 9.3 9.4   MAGNESIUM mg/dL 2.2 2.1  --  2.0 1.9   PHOSPHORUS mg/dL 3.4 3.3  --  4.5 1.8*     Results from last 7 days   Lab Units 02/15/25  1626 02/13/25  2103   AST U/L 13 14   ALT U/L 15 21   ALK PHOS U/L 98 100   TOTAL PROTEIN g/dL 7.1 8.3   ALBUMIN g/dL 3.4* 4.3   TOTAL BILIRUBIN mg/dL 0.45 0.38           Results from last 7 days   Lab Units 02/17/25  0540  "02/16/25  0602 02/15/25  1626 02/15/25  0527 02/14/25  0458 02/13/25  2103   GLUCOSE RANDOM mg/dL 81 92 74 79 94 85             No results found for: \"BETA-HYDROXYBUTYRATE\"       Results from last 7 days   Lab Units 02/16/25  0602 02/15/25  1738 02/15/25  1634   PH SYLVESTER  7.511* 7.378 7.237*   PCO2 SYLVESTER mm Hg 36.6* 47.9 68.3*   PO2 SYLVESTER mm Hg 47.7* 53.6* 40.5   HCO3 SYLVESTER mmol/L 28.6 27.6 28.4   BASE EXC SYLVESTER mmol/L 5.4 1.9 -0.2   O2 CONTENT SYLVESTER ml/dL 12.2 13.0 10.0   O2 HGB, VENOUS % 84.8* 85.0* 62.2                     Results from last 7 days   Lab Units 02/15/25  1634   PROTIME seconds 17.5*   INR  1.38*   PTT seconds 38*         Results from last 7 days   Lab Units 02/17/25  0544 02/16/25  0602 02/15/25  1626 02/15/25  0527 02/14/25  0458   PROCALCITONIN ng/ml 0.21 0.27* 0.23 0.12 <0.05     Results from last 7 days   Lab Units 02/15/25  1626 02/13/25  2103   LACTIC ACID mmol/L 1.0 1.1                                 Results from last 7 days   Lab Units 02/13/25  2103   LIPASE u/L 28                 Results from last 7 days   Lab Units 02/13/25  2116   CLARITY UA  Clear   COLOR UA  Yellow   SPEC GRAV UA  >=1.030   PH UA  6.0   GLUCOSE UA mg/dl Negative   KETONES UA mg/dl Negative   BLOOD UA  Negative   PROTEIN UA mg/dl Negative   NITRITE UA  Negative   BILIRUBIN UA  Negative   UROBILINOGEN UA E.U./dl 0.2   LEUKOCYTES UA  Negative                                 Results from last 7 days   Lab Units 02/15/25  1738 02/15/25  1626   BLOOD CULTURE  No Growth at 72 hrs. No Growth at 72 hrs.                   Past Medical History:   Diagnosis Date    Anxiety     Cancer (HCC)     Depression     Hypernatremia 10/06/2024    Migration of percutaneous endoscopic gastrostomy (PEG) tube ([facility]) 09/24/2023    Psychiatric disorder     bipolar    Sepsis ([facility]) 08/24/2023     Present on Admission:   History of pulmonary embolus (PE)   Mood disorder (HCC)   Mixed axonal-demyelinating polyneuropathy   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HCC)   Chronic " respiratory failure with hypoxia and hypercapnia (HCC)   (Resolved) SIRS (systemic inflammatory response syndrome) (HCC)      Admitting Diagnosis: Hypotension [I95.9]  Ventilator dependent (HCC) [Z99.11]  History of pulmonary embolus (PE) [Z86.711]  Mixed axonal-demyelinating polyneuropathy [G62.89]  S/P percutaneous endoscopic gastrostomy (PEG) tube placement (HCC) [Z93.1]  Acute on chronic respiratory failure with hypoxia and hypercapnia (HCC) [J96.21, J96.22]  Age/Sex: 39 y.o. male    Network Utilization Review Department  ATTENTION: Please call with any questions or concerns to [phone] and carefully listen to the prompts so that you are directed to the right person. All voicemails are confidential.   For Discharge needs, contact Care Management DC Support Team at [phone] opt. [phone]  Send all requests for admission clinical reviews, approved or denied determinations and any other requests to dedicated fax number below belonging to the campus where the patient is receiving treatment. List of dedicated fax numbers for the Facilities:  FACILITY NAME UR FAX NUMBER   ADMISSION DENIALS (Administrative/Medical Necessity) [phone]   DISCHARGE SUPPORT TEAM (NETWORK) [phone]   PARENT CHILD HEALTH (Maternity/NICU/Pediatrics) [phone]   [facility] [phone]   [facility] [phone]   [facility] [phone]   [facility] [phone]   [facility] [phone]   [facility] [phone]   [facility] [phone]   [facility] [phone]   [facility] [phone]   [facility] [phone]   [facility] [phone]   [facility] [phone]

## 2025-02-20 NOTE — PROGRESS NOTES
Patient:  ANNABEL MIKE    MRN:  [id number]    [place] Request ID:  [id number]    Level of care reserved:  Home Health Agency    Partner Reserved:  [facility] [place] [address] [phone]    Clinical needs requested:    Geography searched:  [phone]    Start of Service:    Request sent:  12:11pm EST on 2/15/2025 by Lorene Vasques    Partner reserved:  10:56am EST on 2/20/2025 by Kirsty Elizondo    Choice list shared:  10:56am EST on 2/20/2025 by Kirsty Elizondo

## 2025-02-21 ENCOUNTER — TRANSITIONAL CARE MANAGEMENT (OUTPATIENT)
Dept: FAMILY MEDICINE CLINIC | Facility: CLINIC | Age: 40
End: 2025-02-21

## 2025-02-21 LAB
BACTERIA BLD CULT: NORMAL
BACTERIA BLD CULT: NORMAL

## 2025-02-25 ENCOUNTER — TELEPHONE (OUTPATIENT)
Age: 40
End: 2025-02-25

## 2025-02-25 DIAGNOSIS — Z93.0 STATUS POST TRACHEOSTOMY (HCC): Primary | Chronic | ICD-10-CM

## 2025-02-25 DIAGNOSIS — R68.89 COPIOUS ORAL SECRETIONS: ICD-10-CM

## 2025-02-25 NOTE — TELEPHONE ENCOUNTER
"Safety checklist prior to [facility] will visit:    \"Thank you for inviting us to your home.  In order to ensure we arrive politely, and conduct ourselves safely in your home, please answer the following questions:\"    1 - Where should we park when we arrive?       Street    2 - Are there security doors, monroe, or fences that we will need a code for?       no    3 - Who else will be present at the visit to support you? Patient, sister, grandmother, and niece       \"We want to conduct the visit with you in the way that feels most comfortable for you.  If we need to schedule around another person's time, please let us know.\"    4 - Do you have any pets or other animals in your home?       Dog will be put away upon our arrival       \"If you have a dog, please make sure they are crated, leashed, or placed in a separate room.  Birds, reptiles, and other small animals like gerbils should be kept in a safe enclosure.  This helps our team stay on track with your visit.  We are here to see you, not your pets!\"    5 - Do you have any firearms or other weapons in the home?       no       \"Please store and lock all weapons prior to our arrival.\"    6 - Do you feel acutely sick, or have you had recent sick contacts? no       \"If you have upper respiratory symptoms, such as cough, sneeze, congestion, or a cold, please use a mask for the safety of our team.  We will always mask for your safety as well.\"    7 - Are there any environmental hazards around or near your home now, or at the time of our visit?   no   \"Environmental hazards may include exterior items like storm damage or road construction.  Internal hazards might be home renovations, or pests like ants, flies, or mice.\"    8 - Please have all medicines prescribed to you set up where they may be reviewed for safety. Reviewed with patient's sister Dmitry    9 - If you use marijuana medically, or any recreational drugs, please store these out of sight prior to " our arrival.  Reviewed

## 2025-02-26 ENCOUNTER — IN HOME VISIT (OUTPATIENT)
Age: 40
End: 2025-02-26
Payer: COMMERCIAL

## 2025-02-26 VITALS
HEART RATE: 70 BPM | OXYGEN SATURATION: 99 % | SYSTOLIC BLOOD PRESSURE: 112 MMHG | TEMPERATURE: 98 F | DIASTOLIC BLOOD PRESSURE: 78 MMHG | BODY MASS INDEX: 27.75 KG/M2 | HEIGHT: 76 IN | RESPIRATION RATE: 20 BRPM

## 2025-02-26 DIAGNOSIS — Z99.11 VENTILATOR DEPENDENT (HCC): Chronic | ICD-10-CM

## 2025-02-26 DIAGNOSIS — Z93.1 S/P PERCUTANEOUS ENDOSCOPIC GASTROSTOMY (PEG) TUBE PLACEMENT (HCC): Chronic | ICD-10-CM

## 2025-02-26 DIAGNOSIS — J96.12 CHRONIC RESPIRATORY FAILURE WITH HYPOXIA AND HYPERCAPNIA (HCC): ICD-10-CM

## 2025-02-26 DIAGNOSIS — F32.A DEPRESSION, UNSPECIFIED DEPRESSION TYPE: ICD-10-CM

## 2025-02-26 DIAGNOSIS — G62.89 MIXED AXONAL-DEMYELINATING POLYNEUROPATHY: Chronic | ICD-10-CM

## 2025-02-26 DIAGNOSIS — Z51.5 PALLIATIVE CARE BY SPECIALIST: Primary | ICD-10-CM

## 2025-02-26 DIAGNOSIS — F41.9 ANXIETY: ICD-10-CM

## 2025-02-26 DIAGNOSIS — F39 MOOD DISORDER (HCC): ICD-10-CM

## 2025-02-26 DIAGNOSIS — J96.11 CHRONIC RESPIRATORY FAILURE WITH HYPOXIA AND HYPERCAPNIA (HCC): ICD-10-CM

## 2025-02-26 DIAGNOSIS — Z93.0 STATUS POST TRACHEOSTOMY (HCC): Chronic | ICD-10-CM

## 2025-02-26 PROCEDURE — 99349 HOME/RES VST EST MOD MDM 40: CPT

## 2025-02-26 NOTE — PROGRESS NOTES
Pulmonary     Hemanth is a 38-year-old male who is diagnosed with depression, anxiety disorder, unspecified motor neuron disease, toxic encephalopathy, history of pulmonary embolism on anticoagulation, recurrent respiratory failure status post tracheostomy and G-tube.    Airway clearing therapy has been attempted including manual chest PT and percussor chest PT however this therapy failed to mobilize retained secretions    The patient will use a high-frequency chest wall oscillation device at 5 to 20 Hz for 30 minutes treatments twice daily    Yessy Barksdale MD  Pulmonary and Critical Care Medicine

## 2025-02-26 NOTE — PROGRESS NOTES
[facility] Visit: follow up   Name: Hemanth Swanson      : 1985      MRN: [id number]  Encounter Provider: AMBER Pugh  Encounter Date: 2025   Encounter department: [facility]    Assessment & Plan   1. Palliative care by specialist  Assessment & Plan:  Hemanth follows with palliative care for psychosocial support and symptom management of demyelinating polyneuropathy  Goals of Care- disease focused, to remain in the home.   Symptoms - none currently.   ACP -  on file- Sister Dmitry is decision maker  Next visit - 4 weeks    Social support  Supportive listening provided  Normalized experience of patient/family  Provided anxiety containment  Provided anticipatory guidance  Patient lives with  Sister Dmitry  Nieces and nephews  Patient has 3 biological siblings- Kyle, Dmitry, and Stephanie  Mother- Steph  Father- .      No safety concerns in the home.     2. Mixed axonal-demyelinating polyneuropathy  Assessment & Plan:  Follows with neurology    Orders:  -     Ambulatory referral to Palliative Care  3. Ventilator dependent (HCC)  Assessment & Plan:  Patient and family wish to stay in the home  Family very capable of caring for patient.    Working with PT/OT and Resp. Therapist,.    Awaiting vest to complete vest therapy.   Orders:  -     Ambulatory referral to Palliative Care  4. S/P percutaneous endoscopic gastrostomy (PEG) tube placement (Formerly [facility])  -     Ambulatory referral to Palliative Care  5. Depression, unspecified depression type  -     Ambulatory referral to Palliative Care  6. Mood disorder (HCC)  -     Ambulatory referral to Palliative Care  7. Chronic respiratory failure with hypoxia and hypercapnia (HCC)  -     Ambulatory referral to Palliative Care  8. Anxiety  -     Ambulatory referral to Palliative Care  9. Status post tracheostomy (Formerly [facility])  Assessment & Plan:  tracheostomy in 2023 at [facility]     Currently Vent  dependent.       Subjective   Chief Complaint   Patient presents with    Consult     New home visit for psychosocial support and symptom management secondary to palliative diagnosis of chronic respiratory failure with hypoxia and hypercapnia, ventilator dependent.        History of Present Illness     Hemanth Swanson is a 39 y.o. male who presents in follow up of symptoms related to Mixed axonal-demyelinating polyneuropathy, vent dependent.  The patient is seen in their home due to ambulatory difficulties related to their advanced illness.  Pertinent issues include: symptom management, assessment of goals of care, disease process education and discussion of prognosis, advance care planning    Patient is seen in the with sister ( Dmitry), aunt (Magaly),  palliative CMOC and RN present.    Sister Dmitry is primary caretaker.   She is patients decision maker. Confirmed with patient.     Patient is trached and vent dependent.   He is tube feeds.   Family is trained on vent.   Pulmonary therapist - Charley  PT/OT - twice weekly. - [facility]  Patient is bed bound  Goal is to remain in the home, family is adamant to not go to facility.   Denies pain- able to communicate with thumbs up or down and pointing. Patient able to nod head.  Patient would not want to go to facility. (LTACH).    Patient has been living here for a 1.5 years  September 2023 patient was trached/vented.    Patient is safe and well take care of in the home.   Family interested in Adaptive communication device to aid patient in communication.          Current Outpatient Medications:     apixaban (ELIQUIS) 5 mg, 1 tablet (5 mg total) by Per PEG Tube route 2 (two) times a day, Disp: 60 tablet, Rfl: 2    Incontinence Supply Disposable (Comfort Shield Adult Diapers) MISC, Use 1 each 4 (four) times a day, Disp: 48 each, Rfl: 5    ipratropium-albuterol (DUO-NEB) 0.5-2.5 mg/3 mL nebulizer solution, Take 3 mL by nebulization every 6 (six) hours, Disp:  "360 mL, Rfl: 0    Oral Hygiene Products (Toothette Swabs/Dentifrice) SWAB, Apply to the mouth or throat 3 (three) times a day, Disp: 1000 each, Rfl: 1    QUEtiapine (SEROquel) 200 mg tablet, 1 tablet (200 mg total) by Per G Tube route daily at bedtime, Disp: 30 tablet, Rfl: 0    acetylcysteine (MUCOMYST) 200 mg/mL nebulizer solution, Take 3 mL (600 mg total) by nebulization every 6 (six) hours, Disp: 360 mL, Rfl: 0    NEEDLE, DISP, 18 G 18G X 1\" MISC, Use 2 (two) times a week, Disp: 50 each, Rfl: 3    Syringe/Needle, Disp, (Syringe Luer Slip) 25G X 5/8\" 1 ML MISC, Use 2 (two) times a week, Disp: 50 each, Rfl: 3    Objective   /78 (BP Location: Left arm, Patient Position: Sitting, Cuff Size: Standard)   Pulse 70   Temp 98 °F (36.7 °C) (Temporal)   Resp 20   Ht 6' 4\" (1.93 m)   SpO2 99%   BMI 27.75 kg/m²   Physical Exam  Vitals reviewed.   Constitutional:       General: He is not in acute distress.  HENT:      Head: Normocephalic and atraumatic.   Eyes:      General:         Right eye: No discharge.         Left eye: No discharge.   Cardiovascular:      Rate and Rhythm: Normal rate.   Pulmonary:      Effort: Pulmonary effort is normal. No respiratory distress.   Abdominal:      General: Bowel sounds are normal.   Musculoskeletal:      Right lower leg: No edema.      Left lower leg: No edema.   Skin:     General: Skin is warm and dry.   Neurological:      Mental Status: He is alert. Mental status is at baseline.   Psychiatric:         Mood and Affect: Mood normal.         Behavior: Behavior normal.          Recent labs:  Lab Results   Component Value Date/Time    SODIUM 135 02/17/2025 05:44 AM    SODIUM 136 12/19/2023 12:04 PM    K 3.9 02/17/2025 05:44 AM    K 3.9 12/19/2023 12:04 PM    BUN 13 02/17/2025 05:44 AM    BUN 7 12/19/2023 12:04 PM    CREATININE 0.38 (L) 02/17/2025 05:44 AM    CREATININE 0.68 12/19/2023 12:04 PM    GLUC 81 02/17/2025 05:44 AM    GLUC 86 12/19/2023 12:04 PM    CALCIUM 8.9 " 02/17/2025 05:44 AM    CALCIUM 9.6 12/19/2023 12:04 PM    AST 13 02/15/2025 04:26 PM    AST 25 12/19/2023 12:04 PM    ALT 15 02/15/2025 04:26 PM    ALT 53 12/19/2023 12:04 PM    ALB 3.4 (L) 02/15/2025 04:26 PM    ALB 4.2 12/19/2023 12:04 PM    TP 7.1 02/15/2025 04:26 PM    TP 7.3 12/19/2023 12:04 PM    EGFR 154 02/17/2025 05:44 AM    EGFR 123 12/19/2023 12:04 PM     Lab Results   Component Value Date/Time    HGB 9.1 (L) 02/17/2025 05:44 AM    WBC 5.69 02/17/2025 05:44 AM     02/17/2025 05:44 AM    INR 1.38 (H) 02/15/2025 04:34 PM    PTT 38 (H) 02/15/2025 04:34 PM     Lab Results   Component Value Date/Time    ZBF0TCHUHCBK 2.090 12/16/2024 01:57 AM       Recent Imaging:  Procedure: XR chest 1 view portable  Result Date: 2/16/2025  Narrative: XR CHEST PORTABLE INDICATION: infectious w/u. COMPARISON: Chest radiograph 2/13/2025 FINDINGS: Right chest port in stable position. External leads project over the chest. Tracheostomy tube in the trachea. Low lung volumes with diffuse hazy opacities. Bibasilar opacities. Probable small effusions. Unchanged cardiomediastinal silhouette. Bones are unremarkable for age. Normal upper abdomen.     Impression: Low lung volumes. Diffuse lung opacities may represent edema versus pneumonia. Small bilateral pleural effusions. Workstation performed: [id number]     Procedure: XR abdomen 1 view kub  Result Date: 2/14/2025  Narrative: XR ABDOMEN 1 VIEW KUB INDICATION: s/p PEG change. COMPARISON: 2/13/2025 FINDINGS: There is contrast within the stomach and proximal duodenum. No extraluminal contrast. Nonobstructive bowel gas pattern. No evidence of pneumoperitoneum on this supine study. Upright or left lateral decubitus imaging is more sensitive to detect subtle free air in the appropriate setting. No pathologic calcification or soft tissue mass. Clear lung bases. Bones are unremarkable for the patient's age.     Impression: Contrast within the stomach and duodenum. No extraluminal  contrast. Workstation performed: [id number]     Procedure: XR abdomen 1 view kub  Result Date: 2/14/2025  Narrative: XR ABDOMEN 1 VIEW KUB INDICATION: s/p PEG change. COMPARISON: 2/9/2025 FINDINGS: Note that the left abdomen was not completely included on the study. There is a PEG tube overlying the gastric bubble. Nonobstructive bowel gas pattern. No evidence of pneumoperitoneum on this supine study. Upright or left lateral decubitus imaging is more sensitive to detect subtle free air in the appropriate setting. No pathologic calcification or soft tissue mass. Clear lung bases. Bones are unremarkable for the patient's age.     Impression: PEG tube overlying the gastric bubble. Workstation performed: [id number]     Procedure: XR chest 1 view portable  Result Date: 2/14/2025  Narrative: XR CHEST PORTABLE INDICATION: ams. COMPARISON: 12/23/2024 FINDINGS: Again seen are a tracheostomy and Port-A-Cath on the right. There is bibasilar opacity, likely a combination of atelectasis and possibly pleural fluid. Evaluation of airspace disease is limited due to poor inspiratory effort though congestion/edema is not excluded. Obscured cardiac silhouette. There is an old right clavicle fracture. Normal upper abdomen.     Impression: Limited by poor inspiratory effort with bibasilar atelectasis and possible pleural effusions. Vascular congestion/edema not excluded. Workstation performed: [id number]     Procedure: XR abdomen 1 view kub  Result Date: 2/10/2025  Narrative: XR ABDOMEN 1 VIEW KUB INDICATION: need contrast for tube placement please. COMPARISON: 12/10/2024. FINDINGS: Contrast is identified within the stomach lumen. Nonobstructive bowel gas pattern. No evidence of pneumoperitoneum on this supine study. Upright or left lateral decubitus imaging is more sensitive to detect subtle free air in the appropriate setting. No pathologic calcification or soft tissue mass. Clear lung bases. Bones are unremarkable for the  patient's age.     Impression: No extraluminal contrast identified. Workstation performed: [id number]     Procedure: FL barium swallow video w speech  Result Date: 12/26/2024  Narrative: A video barium swallow study was performed by the Department of Speech Pathology. Please refer to the report for the official interpretation. The images are stored for archival purposes only. Study images were not formally reviewed by the Radiology Department.    Procedure: XR chest portable  Result Date: 12/23/2024  Narrative: XR CHEST PORTABLE INDICATION: VDRF. COMPARISON: 12/20/2024 FINDINGS: Tracheotomy tube positioning unchanged. Central line tip in mid right atrium. Poor inspiration with bilateral pleural effusions similar to prior study. Probable underlying airspace disease bilaterally is again noted without obvious change from prior study. No pneumothorax. The cardiac silhouette is without change from the prior study. There is an old fracture of the right clavicle     Impression: No significant interval change since 12/20/2024 Workstation performed: [id number]     Procedure: XR chest portable  Result Date: 12/20/2024  Narrative: XR CHEST PORTABLE INDICATION: hypoxia. COMPARISON: Compared with 12/19/2024 FINDINGS: Tracheostomy tube and right chest wall pacemaker unchanged. Poor inspiratory effort. Prominent vascular markings. No pneumothorax. Haziness obscuring hemidiaphragms and costophrenic angles. Normal cardiomediastinal silhouette. Bones are unremarkable for age. Normal upper abdomen.     Impression: Poor inspiration. Congestive changes and effusion suggested.. Workstation performed: [id number]     Procedure: XR chest portable  Result Date: 12/19/2024  Narrative: XR CHEST PORTABLE INDICATION: pneumonia. COMPARISON: 12/18/2024 FINDINGS: Unchanged lines and tubes. Low lung volumes, which causes crowding of bronchovascular markings.  Within that limitation, there is persistent left lower lobe atelectasis with subsegmental  right lower lobe atelectasis. Overall appearance is similar to the prior study. No pneumothorax. Small bilateral pleural effusions. Normal cardiomediastinal silhouette. Bones are unremarkable for age. Normal upper abdomen.     Impression: No significant interval change. Workstation performed: [id number]     Procedure: XR chest portable ICU  Result Date: 12/18/2024  Narrative: XR CHEST PORTABLE ICU INDICATION: hypoxia, mucus plugging. COMPARISON: 12/15/2024 FINDINGS: Right-sided infusion port catheter device and tracheostomy appear stable. Artifacts project over the chest and multiple locations. Low lung volumes. Platelike atelectasis in the left lateral midlung field. Lungs otherwise grossly clear. No pneumothorax or pleural effusion. Normal cardiomediastinal silhouette. Bones are unremarkable for age. Normal upper abdomen.     Impression: Platelike atelectasis left midlung field. Stable line and tube positioning. Low lung volumes. Workstation performed: [id number]     Procedure: XR chest portable  Result Date: 12/16/2024  Narrative: XR CHEST PORTABLE INDICATION: AMS, agitation. COMPARISON: Chest radiograph October 31, 2024 FINDINGS: Tracheostomy tube in situ. Tip of right chest wall port projects over the right atrium. Veiled bilateral lower lung opacities, left greater than right No pneumothorax. Normal cardiomediastinal silhouette. Bones are unremarkable for age. Normal upper abdomen.     Impression: Complete left lower lobe atelectasis and segmental right lower lobe atelectasis. Workstation performed: [id number]     Procedure: CT head without contrast  Result Date: 12/16/2024  Narrative: CT BRAIN - WITHOUT CONTRAST INDICATION:   Sepsis, altered mental status, chronically ill with trach and PEG tube. COMPARISON: CT of the head on October 9, 2024. TECHNIQUE:  CT examination of the brain was performed.  Multiplanar 2D reformatted images were created from the source data. Radiation dose length product (DLP) for  this visit:  1038.6 mGy-cm .  This examination, like all CT scans performed in the [facility], was performed utilizing techniques to minimize radiation dose exposure, including the use of iterative  reconstruction and automated exposure control. IMAGE QUALITY:  Diagnostic. FINDINGS: PARENCHYMA:No intracranial mass, mass effect or midline shift. No CT signs of acute infarction.  No acute parenchymal hemorrhage. VENTRICLES AND EXTRA-AXIAL SPACES:  Normal for the patient's age. VISUALIZED ORBITS:  Normal visualized orbits. PARANASAL SINUSES:  Normal visualized paranasal sinuses. CALVARIUM AND EXTRACRANIAL SOFT TISSUES:  Normal.     Impression: No acute intracranial hemorrhage, midline shift, or mass effect. Workstation performed: [id number]     Procedure: CT chest abdomen pelvis w contrast  Result Date: 12/16/2024  Narrative: CT CHEST, ABDOMEN AND PELVIS WITH IV CONTRAST INDICATION: Sepsis, altered mental status, chronically ill with trach and PEG tube. COMPARISON: CT of the chest abdomen pelvis October 11, 2024. TECHNIQUE: CT examination of the chest, abdomen and pelvis was performed. Multiplanar 2D reformatted images were created from the source data. This examination, like all CT scans performed in the [facility], was performed utilizing techniques to minimize radiation dose exposure, including the use of iterative reconstruction and automated exposure control. Radiation dose length product (DLP) for this visit: 912 mGy-cm IV Contrast: 100 mL of iohexol (OMNIPAQUE) Enteric Contrast: Not administered. FINDINGS: CHEST LUNGS: Complete collapse of the left lower lobe. Severe atelectasis of the right lower lobe. There are secretions within the distal trachea and mainstem bronchi. Superimposed aspiration or pneumonia cannot be excluded. Tracheostomy tube in place. PLEURA: Trace left pleural effusion. HEART/GREAT VESSELS: Right-sided Port-A-Cath with its tip in the right atrium. Heart is  unremarkable for patient's age. No thoracic aortic aneurysm. MEDIASTINUM AND NIMA: Unremarkable. CHEST WALL AND LOWER NECK: Unremarkable. ABDOMEN LIVER/BILIARY TREE: Unremarkable. GALLBLADDER: No calcified gallstones. No pericholecystic inflammatory change. SPLEEN: Unremarkable. PANCREAS: Unremarkable. ADRENAL GLANDS: Unremarkable. KIDNEYS/URETERS: 2 mm calculus in the distal right ureter just prior to the ureterovesicular junction (series 301, image 249). No significant hydronephrosis. STOMACH AND BOWEL: G-tube within the stomach. No bowel obstruction. APPENDIX: The appendix is normal in caliber without evidence of appendicitis. High density material within the appendix may be due to appendicoliths. ABDOMINOPELVIC CAVITY: No ascites. No pneumoperitoneum. No lymphadenopathy. VESSELS: Unremarkable for patient's age. PELVIS REPRODUCTIVE ORGANS: Unremarkable for patient's age. URINARY BLADDER: Sheppard catheter within the bladder. Small amount of gas within the bladder likely due to catheterization. There is mild thickening of the urinary bladder wall. ABDOMINAL WALL/INGUINAL REGIONS: Small fat-containing bilateral inguinal hernias. Moderate fat-containing umbilical hernia. BONES: No acute fracture or suspicious osseous lesion. Old right clavicle deformity. Serpiginous sclerosis within the femoral heads compatible with avascular necrosis. No evidence of subchondral collapse. Stable mild compression deformities of T8, T9, and T11-L2.     Impression: 1.  Complete collapse of the left lower lobe and severe atelectasis of the right lower lobe similar to prior examination. Superimposed aspiration or pneumonia cannot be excluded. Trace left pleural effusion. 2.  2 mm calculus in the distal right ureter just prior to the ureterovesicular junction. No significant hydronephrosis. 3.  Mild thickening of the urinary bladder wall, correlate with urinalysis for cystitis. Workstation performed: [id number]     Procedure: XR abdomen 1  "view kub  Result Date: 12/10/2024  Narrative: XR ABDOMEN 1 VIEW KUB INDICATION: PEG tube replacement. COMPARISON: None FINDINGS: Percutaneous gastrostomy tube is present and administered contrast opacifies the stomach. No dilated loops of bowel. Moderate burden of stool throughout the colon. Imaged lung bases are clear. No acute osseous findings.     Impression: Percutaneous gastrostomy tube in the stomach. Workstation performed: [id number]       60 minutes total time spent on 2/26/2025 in caring for this patient including obtaining/reviewing history, symptom assessment and management, medication review / adjustment, psychosocial support, reviewing / ordering tests, medicines, imaging, procedures, goals of care, supportive listening, anticipatory guidance, patient/family education, instructions for management, importance of adhering to treatment plan, risks/benefits of treatment(s), risk factor reduction, and documenting in the medical record. All of the patient's questions were answered during this discussion.    AMBER Pugh  [facility]  [phone]    Portions of this document may have been created using dictation software and as such some \"sound alike\" terms may have been generated by the system. Do not hesitate to contact me with any questions or clarifications.     "

## 2025-02-26 NOTE — PROGRESS NOTES
Hemanth Swanson was seen in the home today.  Patient was seen with sisterDmitry, and other family members. Palliative CRNP and CMOC present at visit.  Family provided all pertinent information today.    Hemanth Swanson 's medications were verified today with sisterDmitry. Dmitry is managing the medications. All medications are up to date. Patient has no refills sent to the provider via EPIC.    Allergies verified. No new allergies.     All medical, surgical, family and social history were reviewed with Dmitry. There are no updates to patient's history.    Next visit with RN and provider in 4 weeks.    All questions and concerns were addressed.  Patient and sister were appreciative of the visit.

## 2025-02-27 ENCOUNTER — TELEMEDICINE (OUTPATIENT)
Dept: FAMILY MEDICINE CLINIC | Facility: CLINIC | Age: 40
End: 2025-02-27
Payer: COMMERCIAL

## 2025-02-27 DIAGNOSIS — Z99.11 VENTILATOR DEPENDENT (HCC): Chronic | ICD-10-CM

## 2025-02-27 DIAGNOSIS — F39 MOOD DISORDER (HCC): ICD-10-CM

## 2025-02-27 DIAGNOSIS — J96.21 ACUTE ON CHRONIC RESPIRATORY FAILURE WITH HYPOXIA (HCC): Primary | ICD-10-CM

## 2025-02-27 PROCEDURE — 99495 TRANSJ CARE MGMT MOD F2F 14D: CPT | Performed by: FAMILY MEDICINE

## 2025-02-27 NOTE — ASSESSMENT & PLAN NOTE
Chronic  Patient and family do not wish for placement and they are managing pt's care at home.  Has f/u scheduled  Working on improving mobility

## 2025-02-27 NOTE — PROGRESS NOTES
Virtual TCM Visit:Name: Hemanth Swanson      : 1985      MRN: [id number]  Encounter Provider: Ela Douglas MD  Encounter Date: 2025   Encounter department: [facility]  :  Assessment & Plan  Acute on chronic respiratory failure with hypoxia (HCC)  Repeat labs ordered   Back to baseline respiratory function  Has pulm follow up scheduled  Managing mucous plugging at home  Has had palliative care consult and will be followed regularly  Getting home PT  Patient and family are very clear that they do not wish for him to go to rehab facility    Orders:  •  CBC and differential; Future  •  Basic metabolic panel; Future    Mood disorder (HCC)  Has been doing well on increased dose Seroquel  Sleep is improved.  Less agitation       Ventilator dependent (HCC)  Chronic  Patient and family do not wish for placement and they are managing pt's care at home.  Has f/u scheduled  Working on improving mobility           Assessment & Plan         History of Present Illness     Transitional Care Management Review:   Hemanth Swanson is a 39 y.o. male here for TCM follow up.    During the TCM phone call patient stated:  TCM Call     Date and time call was made  2025  4:32 PM    Hospital care reviewed  Records reviewed    Patient was hospitial[facility] at  [facility]    Date of Admission  02/15/25    Date of discharge  25    Diagnosis  respiratory failure    Disposition  Home    Were the patients medications reviewed and updated  Yes    Current Symptoms  None      TCM Call     Post hospital issues  None    Should patient be enrolled in anticoag monitoring?  No    Scheduled for follow up?  Yes    Did you obtain your prescribed medications  Yes    Do you need help managing your prescriptions or medications  No  sister manages medications    Is transportation to your appointment needed  No    I have advised the patient to call PCP with any new or worsening symptoms  Cadence MCMULLEN LPN         History of Present Illness    Pt's sister Dmitry notes that pt is doing well since hospital discharge.  Pt waves hello to me via virtual visit and indicates he is doing well.  Home PT is present today working with him     Dmitry notes he has had some mucous plugging.  When it happens, the family encourages cough and uses suction if needed.  Happened 4x in past two days.  Sleep is good overall.    Mucous plugging during daytime    Has been more calm since discharge.  Seroquel dose was increased.  Dmitry notes he only acts agitated when he is sick or something is wrong- this is part of what prompted ER visit most recently along with pt pulling out PEG tube    Tolerating soft diet.   No coughing on food.  Has thinks like pasta/sauce, cupcakes    No pain. (Dmitry notes he humorously indicates no pain except for the PT he is doing)  Doing PT while on visit- appears comfortable and cooperative.          Review of Systems  Objective   There were no vitals taken for this visit.  Physical Exam      Physical Exam  Nursing note reviewed.   Constitutional:       General: He is not in acute distress.     Comments: Awake, on ventilator, chronically ill appearing   Pulmonary:      Comments: On ventilator  Neurological:      Mental Status: He is alert.   Psychiatric:      Comments: Calm, cooperative       Medications have been reviewed by provider in current encounter    Administrative Statements   Encounter provider Ela Douglas MD    The Patient is located at Home and in the following state in which I hold an active license [place].    The patient was identified by name and date of birth. Hemanth ANSELMO Swanson was informed that this is a telemedicine visit and that the visit is being conducted through the Epic Embedded platform. He agrees to proceed..  My office door was closed. No one else was in the room.  He acknowledged consent and understanding of privacy and security of the video platform. The patient has agreed to participate  and understands they can discontinue the visit at any time.    I have spent a total time of 20 minutes in caring for this patient on the day of the visit/encounter including Patient and family education, Documenting in the medical record, Reviewing/placing orders in the medical record (including tests, medications, and/or procedures), and Obtaining or reviewing history  .    Ela Dogulas MD

## 2025-03-03 ENCOUNTER — HOSPITAL ENCOUNTER (EMERGENCY)
Facility: HOSPITAL | Age: 40
End: 2025-03-04
Attending: EMERGENCY MEDICINE
Payer: COMMERCIAL

## 2025-03-03 DIAGNOSIS — Z93.0 TRACHEOSTOMY PRESENT (HCC): ICD-10-CM

## 2025-03-03 DIAGNOSIS — R45.1 AGITATION: Primary | ICD-10-CM

## 2025-03-03 PROCEDURE — 99284 EMERGENCY DEPT VISIT MOD MDM: CPT

## 2025-03-03 NOTE — ASSESSMENT & PLAN NOTE
Hemanth follows with palliative care for psychosocial support and symptom management of demyelinating polyneuropathy  Goals of Care- disease focused, to remain in the home.   Symptoms - none currently.   ACP -  on file- Sister Dmitry is decision maker  Next visit - 4 weeks    Social support  Supportive listening provided  Normalized experience of patient/family  Provided anxiety containment  Provided anticipatory guidance  Patient lives with  Sister Dmitry  Nieces and nephews  Patient has 3 biological siblings- Kyle, Dmitry, and Stephanie  Mother- Steph  Father- .      No safety concerns in the home.

## 2025-03-03 NOTE — ASSESSMENT & PLAN NOTE
Patient and family wish to stay in the home  Family very capable of caring for patient.    Working with PT/OT and Resp. Therapist,.    Awaiting vest to complete vest therapy.

## 2025-03-04 ENCOUNTER — HOSPITAL ENCOUNTER (INPATIENT)
Facility: HOSPITAL | Age: 40
LOS: 4 days | Discharge: HOME | DRG: 760 | End: 2025-03-08
Attending: INTERNAL MEDICINE | Admitting: INTERNAL MEDICINE
Payer: COMMERCIAL

## 2025-03-04 ENCOUNTER — DOCUMENTATION (OUTPATIENT)
Dept: PULMONOLOGY | Facility: CLINIC | Age: 40
End: 2025-03-04

## 2025-03-04 ENCOUNTER — TELEPHONE (OUTPATIENT)
Dept: PSYCHIATRY | Facility: CLINIC | Age: 40
End: 2025-03-04

## 2025-03-04 ENCOUNTER — APPOINTMENT (EMERGENCY)
Dept: RADIOLOGY | Facility: HOSPITAL | Age: 40
End: 2025-03-04
Payer: COMMERCIAL

## 2025-03-04 VITALS
HEIGHT: 76 IN | TEMPERATURE: 96.9 F | RESPIRATION RATE: 16 BRPM | HEART RATE: 45 BPM | OXYGEN SATURATION: 100 % | DIASTOLIC BLOOD PRESSURE: 93 MMHG | SYSTOLIC BLOOD PRESSURE: 136 MMHG | BODY MASS INDEX: 30.66 KG/M2 | WEIGHT: 251.77 LBS

## 2025-03-04 DIAGNOSIS — J96.11 CHRONIC RESPIRATORY FAILURE WITH HYPOXIA AND HYPERCAPNIA (HCC): ICD-10-CM

## 2025-03-04 DIAGNOSIS — Z74.09 IMPAIRED MOBILITY: ICD-10-CM

## 2025-03-04 DIAGNOSIS — Z93.0 STATUS POST TRACHEOSTOMY (HCC): ICD-10-CM

## 2025-03-04 DIAGNOSIS — R45.1 AGITATION: Primary | ICD-10-CM

## 2025-03-04 DIAGNOSIS — F39 MOOD DISORDER (HCC): ICD-10-CM

## 2025-03-04 DIAGNOSIS — R45.89 SUICIDAL RISK: ICD-10-CM

## 2025-03-04 DIAGNOSIS — J96.12 CHRONIC RESPIRATORY FAILURE WITH HYPOXIA AND HYPERCAPNIA (HCC): ICD-10-CM

## 2025-03-04 DIAGNOSIS — Z99.11 VENTILATOR DEPENDENT (HCC): ICD-10-CM

## 2025-03-04 DIAGNOSIS — Z93.1 S/P PERCUTANEOUS ENDOSCOPIC GASTROSTOMY (PEG) TUBE PLACEMENT (HCC): ICD-10-CM

## 2025-03-04 DIAGNOSIS — G37.3 TRANSVERSE MYELITIS (HCC): ICD-10-CM

## 2025-03-04 LAB
ALBUMIN SERPL BCG-MCNC: 3.8 G/DL (ref 3.5–5)
ALP SERPL-CCNC: 91 U/L (ref 34–104)
ALT SERPL W P-5'-P-CCNC: 13 U/L (ref 7–52)
ANION GAP SERPL CALCULATED.3IONS-SCNC: 8 MMOL/L (ref 4–13)
APTT PPP: 29 SECONDS (ref 23–34)
AST SERPL W P-5'-P-CCNC: 13 U/L (ref 13–39)
ATRIAL RATE: 44 BPM
ATRIAL RATE: 86 BPM
BASE EX.OXY STD BLDV CALC-SCNC: 83.6 % (ref 60–80)
BASE EX.OXY STD BLDV CALC-SCNC: 97.4 % (ref 60–80)
BASE EXCESS BLDV CALC-SCNC: 4.7 MMOL/L
BASE EXCESS BLDV CALC-SCNC: 7.6 MMOL/L
BASOPHILS # BLD AUTO: 0.05 THOUSANDS/ÂΜL (ref 0–0.1)
BASOPHILS NFR BLD AUTO: 1 % (ref 0–1)
BILIRUB SERPL-MCNC: 0.41 MG/DL (ref 0.2–1)
BILIRUB UR QL STRIP: NEGATIVE
BUN SERPL-MCNC: 16 MG/DL (ref 5–25)
CALCIUM SERPL-MCNC: 9.3 MG/DL (ref 8.4–10.2)
CHLORIDE SERPL-SCNC: 94 MMOL/L (ref 96–108)
CLARITY UR: CLEAR
CO2 SERPL-SCNC: 34 MMOL/L (ref 21–32)
COLOR UR: NORMAL
CREAT SERPL-MCNC: 0.46 MG/DL (ref 0.6–1.3)
EOSINOPHIL # BLD AUTO: 0.03 THOUSAND/ÂΜL (ref 0–0.61)
EOSINOPHIL NFR BLD AUTO: 0 % (ref 0–6)
ERYTHROCYTE [DISTWIDTH] IN BLOOD BY AUTOMATED COUNT: 15.9 % (ref 11.6–15.1)
GFR SERPL CREATININE-BSD FRML MDRD: 143 ML/MIN/1.73SQ M
GLUCOSE SERPL-MCNC: 105 MG/DL (ref 65–140)
GLUCOSE UR STRIP-MCNC: NEGATIVE MG/DL
HCO3 BLDV-SCNC: 30.4 MMOL/L (ref 24–30)
HCO3 BLDV-SCNC: 33.4 MMOL/L (ref 24–30)
HCT VFR BLD AUTO: 33.1 % (ref 36.5–49.3)
HGB BLD-MCNC: 10.2 G/DL (ref 12–17)
HGB UR QL STRIP.AUTO: NEGATIVE
IMM GRANULOCYTES # BLD AUTO: 0.03 THOUSAND/UL (ref 0–0.2)
IMM GRANULOCYTES NFR BLD AUTO: 0 % (ref 0–2)
INR PPP: 1.08 (ref 0.85–1.19)
KETONES UR STRIP-MCNC: NEGATIVE MG/DL
LACTATE SERPL-SCNC: 1.1 MMOL/L (ref 0.5–2)
LEUKOCYTE ESTERASE UR QL STRIP: NEGATIVE
LYMPHOCYTES # BLD AUTO: 0.98 THOUSANDS/ÂΜL (ref 0.6–4.47)
LYMPHOCYTES NFR BLD AUTO: 14 % (ref 14–44)
MCH RBC QN AUTO: 28.7 PG (ref 26.8–34.3)
MCHC RBC AUTO-ENTMCNC: 30.8 G/DL (ref 31.4–37.4)
MCV RBC AUTO: 93 FL (ref 82–98)
MONOCYTES # BLD AUTO: 0.7 THOUSAND/ÂΜL (ref 0.17–1.22)
MONOCYTES NFR BLD AUTO: 10 % (ref 4–12)
NEUTROPHILS # BLD AUTO: 5.42 THOUSANDS/ÂΜL (ref 1.85–7.62)
NEUTS SEG NFR BLD AUTO: 75 % (ref 43–75)
NITRITE UR QL STRIP: NEGATIVE
NRBC BLD AUTO-RTO: 0 /100 WBCS
O2 CT BLDV-SCNC: 12.7 ML/DL
O2 CT BLDV-SCNC: 15.2 ML/DL
P AXIS: 47 DEGREES
P AXIS: 52 DEGREES
PCO2 BLDV: 50.4 MM HG (ref 42–50)
PCO2 BLDV: 53.7 MM HG (ref 42–50)
PH BLDV: 7.4 [PH] (ref 7.3–7.4)
PH BLDV: 7.41 [PH] (ref 7.3–7.4)
PH UR STRIP.AUTO: 6.5 [PH]
PLATELET # BLD AUTO: 248 THOUSANDS/UL (ref 149–390)
PMV BLD AUTO: 10.1 FL (ref 8.9–12.7)
PO2 BLDV: 153.4 MM HG (ref 35–45)
PO2 BLDV: 50.3 MM HG (ref 35–45)
POTASSIUM SERPL-SCNC: 3.7 MMOL/L (ref 3.5–5.3)
PR INTERVAL: 158 MS
PR INTERVAL: 210 MS
PROCALCITONIN SERPL-MCNC: 0.07 NG/ML
PROT SERPL-MCNC: 7.7 G/DL (ref 6.4–8.4)
PROT UR STRIP-MCNC: NEGATIVE MG/DL
PROTHROMBIN TIME: 14.8 SECONDS (ref 12.3–15)
QRS AXIS: 84 DEGREES
QRS AXIS: 93 DEGREES
QRSD INTERVAL: 116 MS
QRSD INTERVAL: 90 MS
QT INTERVAL: 360 MS
QT INTERVAL: 482 MS
QTC INTERVAL: 412 MS
QTC INTERVAL: 430 MS
RBC # BLD AUTO: 3.55 MILLION/UL (ref 3.88–5.62)
SODIUM SERPL-SCNC: 136 MMOL/L (ref 135–147)
SP GR UR STRIP.AUTO: 1.01 (ref 1–1.03)
T WAVE AXIS: 45 DEGREES
T WAVE AXIS: 68 DEGREES
UROBILINOGEN UR STRIP-ACNC: <2 MG/DL
VENTRICULAR RATE: 44 BPM
VENTRICULAR RATE: 86 BPM
WBC # BLD AUTO: 7.21 THOUSAND/UL (ref 4.31–10.16)

## 2025-03-04 PROCEDURE — 94760 N-INVAS EAR/PLS OXIMETRY 1: CPT

## 2025-03-04 PROCEDURE — 83605 ASSAY OF LACTIC ACID: CPT

## 2025-03-04 PROCEDURE — 96361 HYDRATE IV INFUSION ADD-ON: CPT

## 2025-03-04 PROCEDURE — 99223 1ST HOSP IP/OBS HIGH 75: CPT | Performed by: INTERNAL MEDICINE

## 2025-03-04 PROCEDURE — 71045 X-RAY EXAM CHEST 1 VIEW: CPT

## 2025-03-04 PROCEDURE — 36415 COLL VENOUS BLD VENIPUNCTURE: CPT

## 2025-03-04 PROCEDURE — 81003 URINALYSIS AUTO W/O SCOPE: CPT

## 2025-03-04 PROCEDURE — 80053 COMPREHEN METABOLIC PANEL: CPT

## 2025-03-04 PROCEDURE — 87040 BLOOD CULTURE FOR BACTERIA: CPT

## 2025-03-04 PROCEDURE — 5A1955Z RESPIRATORY VENTILATION, GREATER THAN 96 CONSECUTIVE HOURS: ICD-10-PCS

## 2025-03-04 PROCEDURE — 93005 ELECTROCARDIOGRAM TRACING: CPT

## 2025-03-04 PROCEDURE — 94002 VENT MGMT INPAT INIT DAY: CPT

## 2025-03-04 PROCEDURE — 85025 COMPLETE CBC W/AUTO DIFF WBC: CPT

## 2025-03-04 PROCEDURE — 82805 BLOOD GASES W/O2 SATURATION: CPT

## 2025-03-04 PROCEDURE — 96366 THER/PROPH/DIAG IV INF ADDON: CPT

## 2025-03-04 PROCEDURE — 84145 PROCALCITONIN (PCT): CPT

## 2025-03-04 PROCEDURE — 99221 1ST HOSP IP/OBS SF/LOW 40: CPT | Performed by: PSYCHIATRY & NEUROLOGY

## 2025-03-04 PROCEDURE — 94640 AIRWAY INHALATION TREATMENT: CPT

## 2025-03-04 PROCEDURE — 96375 TX/PRO/DX INJ NEW DRUG ADDON: CPT

## 2025-03-04 PROCEDURE — 94150 VITAL CAPACITY TEST: CPT

## 2025-03-04 PROCEDURE — 93010 ELECTROCARDIOGRAM REPORT: CPT | Performed by: INTERNAL MEDICINE

## 2025-03-04 PROCEDURE — 99291 CRITICAL CARE FIRST HOUR: CPT | Performed by: EMERGENCY MEDICINE

## 2025-03-04 PROCEDURE — 93010 ELECTROCARDIOGRAM REPORT: CPT | Performed by: STUDENT IN AN ORGANIZED HEALTH CARE EDUCATION/TRAINING PROGRAM

## 2025-03-04 PROCEDURE — 85730 THROMBOPLASTIN TIME PARTIAL: CPT

## 2025-03-04 PROCEDURE — 82805 BLOOD GASES W/O2 SATURATION: CPT | Performed by: EMERGENCY MEDICINE

## 2025-03-04 PROCEDURE — 85610 PROTHROMBIN TIME: CPT

## 2025-03-04 PROCEDURE — 94664 DEMO&/EVAL PT USE INHALER: CPT

## 2025-03-04 PROCEDURE — 96365 THER/PROPH/DIAG IV INF INIT: CPT

## 2025-03-04 RX ORDER — MIDAZOLAM HYDROCHLORIDE 2 MG/2ML
5 INJECTION, SOLUTION INTRAMUSCULAR; INTRAVENOUS ONCE
Status: COMPLETED | OUTPATIENT
Start: 2025-03-04 | End: 2025-03-04

## 2025-03-04 RX ORDER — OLANZAPINE 10 MG/2ML
5 INJECTION, POWDER, FOR SOLUTION INTRAMUSCULAR ONCE
Status: DISCONTINUED | OUTPATIENT
Start: 2025-03-04 | End: 2025-03-04

## 2025-03-04 RX ORDER — QUETIAPINE FUMARATE 25 MG/1
50 TABLET, FILM COATED ORAL 2 TIMES DAILY
Status: DISCONTINUED | OUTPATIENT
Start: 2025-03-04 | End: 2025-03-08 | Stop reason: HOSPADM

## 2025-03-04 RX ORDER — ACETYLCYSTEINE 200 MG/ML
3 SOLUTION ORAL; RESPIRATORY (INHALATION) EVERY 6 HOURS
Status: DISCONTINUED | OUTPATIENT
Start: 2025-03-04 | End: 2025-03-05

## 2025-03-04 RX ORDER — QUETIAPINE FUMARATE 100 MG/1
200 TABLET, FILM COATED ORAL
Status: DISCONTINUED | OUTPATIENT
Start: 2025-03-04 | End: 2025-03-04

## 2025-03-04 RX ORDER — POLYETHYLENE GLYCOL 3350 17 G/17G
17 POWDER, FOR SOLUTION ORAL DAILY PRN
Status: DISCONTINUED | OUTPATIENT
Start: 2025-03-04 | End: 2025-03-08 | Stop reason: HOSPADM

## 2025-03-04 RX ORDER — DEXMEDETOMIDINE HYDROCHLORIDE 4 UG/ML
.1-.7 INJECTION, SOLUTION INTRAVENOUS
Status: DISCONTINUED | OUTPATIENT
Start: 2025-03-04 | End: 2025-03-04 | Stop reason: HOSPADM

## 2025-03-04 RX ORDER — CHLORHEXIDINE GLUCONATE ORAL RINSE 1.2 MG/ML
15 SOLUTION DENTAL EVERY 12 HOURS SCHEDULED
Status: DISCONTINUED | OUTPATIENT
Start: 2025-03-04 | End: 2025-03-08 | Stop reason: HOSPADM

## 2025-03-04 RX ORDER — IPRATROPIUM BROMIDE AND ALBUTEROL SULFATE 2.5; .5 MG/3ML; MG/3ML
3 SOLUTION RESPIRATORY (INHALATION)
Status: DISCONTINUED | OUTPATIENT
Start: 2025-03-04 | End: 2025-03-08 | Stop reason: HOSPADM

## 2025-03-04 RX ORDER — OLANZAPINE 10 MG/2ML
5 INJECTION, POWDER, FOR SOLUTION INTRAMUSCULAR ONCE
Status: CANCELLED | OUTPATIENT
Start: 2025-03-04

## 2025-03-04 RX ADMIN — IPRATROPIUM BROMIDE AND ALBUTEROL SULFATE 3 ML: .5; 3 SOLUTION RESPIRATORY (INHALATION) at 21:11

## 2025-03-04 RX ADMIN — IPRATROPIUM BROMIDE AND ALBUTEROL SULFATE 3 ML: .5; 3 SOLUTION RESPIRATORY (INHALATION) at 08:33

## 2025-03-04 RX ADMIN — CHLORHEXIDINE GLUCONATE 0.12% ORAL RINSE 15 ML: 1.2 LIQUID ORAL at 21:06

## 2025-03-04 RX ADMIN — MIDAZOLAM 5 MG: 1 INJECTION INTRAMUSCULAR; INTRAVENOUS at 00:24

## 2025-03-04 RX ADMIN — QUETIAPINE FUMARATE 50 MG: 25 TABLET ORAL at 16:45

## 2025-03-04 RX ADMIN — APIXABAN 5 MG: 5 TABLET, FILM COATED ORAL at 17:08

## 2025-03-04 RX ADMIN — DEXMEDETOMIDINE HYDROCHLORIDE 0.1 MCG/KG/HR: 4 INJECTION, SOLUTION INTRAVENOUS at 02:46

## 2025-03-04 RX ADMIN — ACETYLCYSTEINE 600 MG: 200 SOLUTION ORAL; RESPIRATORY (INHALATION) at 13:37

## 2025-03-04 RX ADMIN — IPRATROPIUM BROMIDE AND ALBUTEROL SULFATE 3 ML: .5; 3 SOLUTION RESPIRATORY (INHALATION) at 13:37

## 2025-03-04 RX ADMIN — SODIUM CHLORIDE 500 ML: 0.9 INJECTION, SOLUTION INTRAVENOUS at 00:35

## 2025-03-04 RX ADMIN — ACETYLCYSTEINE 600 MG: 200 SOLUTION ORAL; RESPIRATORY (INHALATION) at 21:11

## 2025-03-04 RX ADMIN — QUETIAPINE FUMARATE 150 MG: 25 TABLET ORAL at 21:06

## 2025-03-04 RX ADMIN — APIXABAN 5 MG: 5 TABLET, FILM COATED ORAL at 12:08

## 2025-03-04 RX ADMIN — ACETYLCYSTEINE 600 MG: 200 SOLUTION ORAL; RESPIRATORY (INHALATION) at 08:33

## 2025-03-04 NOTE — TELEMEDICINE
TeleConsultation - Behavioral Health   Name: Hemanth Swanson 39 y.o. male I MRN: [id number]  Unit/Bed#: ICU 03 I Date of Admission: 3/4/2025   Date of Service: 3/4/2025 I Hospital Day: 0    Consults  Physician Requesting Consult: Jovan Rockwell*  Principal Problem:Chronic respiratory failure with hypoxia and hypercapnia (HCC)  Reason for Consult: Increased agitation     Assessment & Plan   Unspecified mood disorder  Impulse control disorder unspecified    TREATMENT PLAN RECOMMENDATIONS:  Medications: Increase Seroquel to 50 mg twice daily and 200 mg at bedtime.  Add lorazepam 1 mg every 6 hours as needed for agitation       Informed consent for the above medication has been obtained including discussion of the risks, benefits and alternatives: No. Unable to obtain due to patient condition, lack of cooperation.    Disposition: Was unable to complete interview with patient at this time due to lack of cooperation , please re-consult when patient is able to be interviewed.    Legal Status Recommendation:  n/a    Multiple Antipsychotic Review: N/A    Psychotherapy/Psychoeducation: N/A to this case.    Other/Medical Work Up and/or treatment modality recommendations: N/A to this case.    Patient Caregiver/Family Education: N/A    Follow-up: Re-consult PRN    Report regarding the above Assessment and Treatment plan was provided to: Dr. Ramos      History of Present Illness      Patient is a 39 y.o. male with a history of Bipolar Disorder, type I who was presented to the hospital due to agitation and concern regarding suicidal ideation.  During the evaluation patient allow the psychiatrist to do this consult but later he become more guarded and uncooperative and did not respond to the psychiatrist questions.  Most of the information was obtained from the record and the staff.  As.  Patient has been getting agitated on and off and try to remove his tracheostomy tube and family has concern that he has been feeling  "severely depressed and may have suicidal ideation.     Historical Information     Past Psychiatric History: Unable to obtain    Substance Abuse History:    E-Cigarette/Vaping    E-Cigarette Use Former User     Start Date 20     Quit Date 25           Social History       Tobacco History       Smoking Status  Former Smoking Start Date  2008 Quit Date  2023 Average Packs/Day  0.7 packs/day for 22.7 years (15.0 ttl pk-yrs) Smoking Tobacco Type  Cigarettes from 2008 to 2023   Pack Year History     Packs/Day From To Years    0 2023  1.8    0.5 2008 15.4    1   7.3      Passive Exposure  Never      Smokeless Tobacco Use  Never              Alcohol History       Alcohol Use Status  Not Currently Drinks/Week  0 Glasses of wine, 0 Cans of beer, 0 Shots of liquor, 0 Standard drinks or equivalent per week Comment  Former alcoholic. Last use in 2016              Drug Use       Drug Use Status  Not Currently Types  \"Crack\" cocaine, Marijuana Comment  Current use medical marijuana. Not currently using crack cocaine.              Sexual Activity       Sexually Active  Not Currently Partners  Female Birth Control/Protection  Other              Other Factors    Not Asked                         Social History:    Social History       Social History     Socioeconomic History    Marital status: Single     Spouse name: Not on file    Number of children: Not on file    Years of education: Not on file    Highest education level: Not on file   Occupational History    Not on file   Tobacco Use    Smoking status: Former     Current packs/day: 0.00     Average packs/day: 0.7 packs/day for 22.7 years (15.0 ttl pk-yrs)     Types: Cigarettes     Start date: 2008     Quit date: 2023     Years since quittin.7     Passive exposure: Never    Smokeless tobacco: Never   Vaping Use    Vaping status: Former    Start date: 2018    Quit date: 2023    Substances: Nicotine, THC   Substance and " "Sexual Activity    Alcohol use: Not Currently     Comment: 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 Not currently using crack cocaine.    Sexual activity: Not Currently     Partners: Female     Birth control/protection: Other   Other Topics Concern    Not on file   Social History Narrative    ** Merged History Encounter **          Social Drivers of Health     Financial Resource Strain: Low Risk  (2024)    Overall Financial Resource Strain (CARDIA)     Difficulty of Paying Living Expenses: Not hard at all   Food Insecurity: Patient Unable To Answer (3/4/2025)    Nursing - Inadequate Food Risk Classification     Worried About Running Out of Food in the Last Year: Never true     Ran Out of Food in the Last Year: Never true     Ran Out of Food in the Last Year: Patient unable to answer   Transportation Needs: Patient Unable To Answer (3/4/2025)    Nursing - Transportation Risk Classification     Lack of Transportation: Not on file     Lack of Transportation: Patient unable to answer   Physical Activity: Not on file   Stress: Not on file   Social Connections: Not on file   Intimate Partner Violence: Patient Unable To Answer (3/4/2025)    Nursing IPS     Feels Physically and Emotionally Safe: Not on file     Physically Hurt by Someone: Not on file     Humiliated or Emotionally Abused by Someone: Not on file     Physically Hurt by Someone: Patient unable to answer     Hurt or Threatened by Someone: Patient unable to answer   Housing Stability: Patient Unable To Answer (3/4/2025)    Nursing: Inadequate Housing Risk Classification     Has Housing: Not on file     Worried About Losing Housing: Not on file     Unable to Get Utilities: Not on file     Unable to Pay for Housing in the Last Year: Patient unable to answer     Has Housin                   Past Medical History:    Past Medical History:   Diagnosis Date "    Anxiety     Cancer (HCC)     Depression     Hypernatremia 10/06/2024    Migration of percutaneous endoscopic gastrostomy (PEG) tube ([facility]) 09/24/2023    Psychiatric disorder     bipolar    Sepsis ([facility]) 08/24/2023          Meds/Allergies     Allergies   Allergen Reactions    Dog Epithelium Cough, Sneezing and Nasal Congestion     all current active meds have been reviewed    Medical Review Of Systems:    Review of Systems      Objective     Vital signs in last 24 hours:  Temp:  [96.9 °F (36.1 °C)] 96.9 °F (36.1 °C)  HR:  [41-85] 41  BP: ()/(56-95) 151/95  Resp:  [16-18] 18  SpO2:  [93 %-100 %] 93 %  O2 Device: Ventilator  FiO2 (%):  [35] 35    No intake or output data in the 24 hours ending 03/04/25 2692    Mental Status Evaluation::    Appearance age appropriate   Behavior cooperative, uncooperative   Speech non-verbal   Mood depressed, anxious   Affect reactive   Thought Processes organized, goal directed, patient is non-verbal   Associations patient is non-verbal   Thought Content Unable to assess   Perceptual Disturbances: no auditory hallucinations, no visual hallucinations, not observed   Abnormal Thoughts  Risk Potential Suicidal ideation - None, unable to assess  Homicidal ideation - None, unable to assess  Potential for aggression - Yes, due to agitation   Orientation patient is non-verbal   Memory patient is non-verbal   Consciousness alert   Attention Span Concentration Span poor attention span   Intellect not formally assessed due to lack of cooperation   Insight poor   Judgement poor   Muscle Strength and  Gait No assessed   Motor Activity no abnormal movements                       Lab Results:  I have reviewed the following lab results:   .     03/04/25  0039   WBC 7.21   HGB 10.2*   HCT 33.1*      SODIUM 136   K 3.7   CL 94*   CO2 34*   BUN 16   CREATININE 0.46*   GLUC 105   AST 13   ALT 13   ALB 3.8   TBILI 0.41   ALKPHOS 91          Lipid Profile:   Lab Results   Component Value Date  "   CHOLESTEROL 183 12/10/2022    HDL 45 12/10/2022    TRIG 280 (H) 12/10/2022    LDLCALC 82 12/10/2022    NONHDLC 138 12/10/2022   Thyroid Studies:   Lab Results   Component Value Date    ENE5VLNDJKFC 2.090 12/16/2024     Ammonia:   Lab Results   Component Value Date    AMMONIA 19 06/10/2024     Drug Levels: No results found for: \"VALPROICTOT\", \"VALPROICACID\", \"LITHIUM\", \"CARBAMAZEPIN\", \"CLOZAPINE\", \"NCLOZIP\"    Imaging Results Review: No pertinent imaging studies reviewed.  Other Study Results Review: No additional pertinent studies reviewed.    Code Status: Level 1 - Full Code  Advance Directive and Living Will:      Power of : Yes  POLST:      Screenings:    1. Nutrition Screening  Nutrition Assessment (completed by Staff): Nutrition  Feeding: Total assist  Diet Type: Tube feeding    2. Pain Screening  Pain Screening: Pain Assessment  Pain Assessment Tool: FLACC  Pain Rating: FLACC (Rest) - Face: No particular expression or smile  Pain Rating: FLACC (Rest) - Legs: Normal position or relaxed  Pain Rating: FLACC (Rest) - Activity: Lying quietly, normal position, moves easily  Pain Rating: FLACC (Rest) - Cry: No cry (awake or asleep)  Pain Rating: FLACC (Rest) - Consolability: Content, relaxed  Score: FLACC (Rest): 0    3. Suicide Screening  Unable to assess due to lack of cooperation    Administrative Statements   VIRTUAL CARE DOCUMENTATION:     1. This service was provided via Telemedicine using Teams Virtual Rounding      2. Parties in the room with patient during teleconsult RN    3. Confidentiality My office door was closed     4. Participants No one else was in the room    5. Patient acknowledged consent and understanding of privacy and security of the  Telemedicine consult. I informed the patient that I have reviewed their record in Epic and presented the opportunity for them to ask any questions regarding the visit today.  The patient agreed to participate.    6. I have spent a total time of 30 " minutes in caring for this patient on the day of the visit/encounter including Counseling / Coordination of care, Documenting in the medical record, Reviewing/placing orders in the medical record (including tests, medications, and/or procedures), Obtaining or reviewing history  , and Communicating with other healthcare professionals , not including the time spent for establishing the audio/video connection.

## 2025-03-04 NOTE — ASSESSMENT & PLAN NOTE
Patient and family wish to stay in the home  Family very capable of caring for patient.  However, in the setting of recurrent episodes of agitation, pulling out trach/PEG - it appears to be more appropriate for patient to stay in long-term care facility- Discussed with sister/ Caregiver Dmitry- she verbalized patient mentioned before that he would die instead of staying in the facility and she refused potential LTAC placement clearly.

## 2025-03-04 NOTE — ED PROVIDER NOTES
Time reflects when diagnosis was documented in both MDM as applicable and the Disposition within this note       Time User Action Codes Description Comment    3/4/2025  3:42 AM Keny Ward Add [Z93.0] Tracheostomy present (HCC)     3/4/2025  3:42 AM Keny Ward Add [R45.1] Agitation     3/4/2025  3:42 AM Keny Ward Modify [Z93.0] Tracheostomy present (HCC)     3/4/2025  3:42 AM Keny Ward Modify [R45.1] Agitation           ED Disposition       ED Disposition   Transfer to Another Facility-In Network    Condition   --    Date/Time   Tue Mar 4, 2025  3:41 AM    Comment   Hemanth Swanson should be transferred out to [facility].               Assessment & Plan       Medical Decision Making  Differential diagnose includes but not limited to: Acute agitation, medication reaction, infection, electrolyte abnormality, infection    Hemanth brought in by EMS after family called due to acute agitation.  Patient has baseline trached and pegged and nonverbal.  They stated once giving him his nightly dose of Seroquel, he became agitated and consented to kick and hit feeling members.  He was in need of 5 mg Versed via EMS due to agitation, and further chemical sedation arrival to the ER.  He was continually trying to pull his tracheostomy tube out and IVs.  Upon chart review, he does have a history of reported SI with attempting to pull his trach previously.  Due to this, patient was placed in restraints and continually chemically sedated with Precedex.  Due to patient's not safe discharge plan, he was in need of ICU level care due to being vent dependent.  Patient was in need of transfer to [facility] ICU due to this ICU being at capacity.  Verbal consent was contained and update provided to sister via telephone.  Patient remained hemodynamically stable while sedated and transferred in stable condition.    Amount and/or Complexity of Data Reviewed  Labs: ordered. Decision-making details documented  "in ED Course.  Radiology: ordered and independent interpretation performed.    Risk  Prescription drug management.        ED Course as of 03/04/25 0700   Tue Mar 04, 2025   0050 Called the patient's sister, who is emergency contact in the chart, and discussed the clinical scenario.  She confirmed the story from EMS that patient received his Seroquel, and then became acutely agitated.  She states this does happen occasionally and usually \"something else is going on.\"  Prior to the agitation this evening, he was acting his normal self.  She states he has nonverbal, but does struggle shoulder shake his head yes and no, and gives thumbs up and thumbs down.   0131 Comprehensive metabolic panel(!)  Within normal limits, reassuring with no signs of endorgan damage   0131 CBC and differential(!)  At or improved from patient's baseline.   0230 Patient was in need of chemical sedation 2 times on arrival to the ER due to aggravated behavior, attempting to kick hospital personnel, and attempting to rip out his trach and PEG.  Adequate sedation achieved with 5 additional milligrams of Versed after EMS gave the same.  Precedex infusion initiated for continued chemical sedation during laboratory workup and likely ICU admission.   0230 Blood gas, venous(!)  Elevated venous O2, FiO2 reduced to 40%.   0231 Discussed patient with ICU here at [facility], they notified us that they have no current beds available, and requested transfer to a facility with space.  Transfer order placed.   0344 Contacted sister, Dmitry Swanson, via telephone, discussed plan to transfer to [facility] and she was agreeable.  Also discussed, at request of ICU attending physician, that the patient would likely need long-term acute care facility placement after ICU discharge.  Sister was agreeable to discuss once patient is settled into the ICU at [facility].  Verbal consent was obtained for transfer.       Medications "   dexmedeTOMIDine (Precedex) 400 mcg in sodium chloride 0.9% 100 mL (0.2 mcg/kg/hr × 114 kg Intravenous Rate/Dose Change 3/4/25 0315)   sodium chloride 0.9 % bolus 500 mL (0 mL Intravenous Stopped 3/4/25 0135)   midazolam (VERSED) injection 5 mg (5 mg Intravenous Given 3/4/25 0024)       ED Risk Strat Scores                            SBIRT 22yo+      Flowsheet Row Most Recent Value   Initial Alcohol Screen: US AUDIT-C     1. How often do you have a drink containing alcohol? 0 Filed at: 03/04/2025 0208   2. How many drinks containing alcohol do you have on a typical day you are drinking?  0 Filed at: 03/04/2025 0208   3a. Male UNDER 65: How often do you have five or more drinks on one occasion? 0 Filed at: 03/04/2025 0208   3b. FEMALE Any Age, or MALE 65+: How often do you have 4 or more drinks on one occassion? 0 Filed at: 03/04/2025 0208   Audit-C Score 0 Filed at: 03/04/2025 0208   JEAN MARIE: How many times in the past year have you...    Used an illegal drug or used a prescription medication for non-medical reasons? Never Filed at: 03/04/2025 0208                            History of Present Illness       Chief Complaint   Patient presents with    Medical Problem     Pt. Brought by EMS from home. Pt. Was given 250 Seroquel instead of his regular dose of 150,  he then became combative, and tried to pull out his trache. Given 4 versed IM by EMS.        Past Medical History:   Diagnosis Date    Anxiety     Cancer (HCC)     Depression     Hypernatremia 10/06/2024    Migration of percutaneous endoscopic gastrostomy (PEG) tube (HCC) 09/24/2023    Psychiatric disorder     bipolar    Sepsis (HCC) 08/24/2023      Past Surgical History:   Procedure Laterality Date    IR BIOPSY LYMPH NODE  01/20/2023    IR GASTROSTOMY (G) TUBE CHECK/CHANGE/REINSERTION/UPSIZE  06/18/2024    IR GASTROSTOMY TUBE PLACEMENT  09/25/2023    IR LUMBAR PUNCTURE  09/06/2023    IR LUMBAR PUNCTURE  10/25/2024    IR PORT PLACEMENT  03/14/2023     "ORCHIECTOMY Left 2022    Procedure: ORCHIECTOMY INGUINAL;  Surgeon: Flip Michael MD;  Location:  MAIN OR;  Service: Urology    PEG W/TRACHEOSTOMY PLACEMENT N/A 2023    Procedure: TRACHEOSTOMY WITH INSERTION PEG TUBE;  Surgeon: Rudy Mcmanus MD;  Location: [facility] OR;  Service: General    TESTICLE SURGERY        Family History   Problem Relation Age of Onset    Coronary artery disease Maternal Aunt     Cancer Father     Diabetes Father     Hypertension Father       Social History     Tobacco Use    Smoking status: Former     Current packs/day: 0.00     Average packs/day: 0.7 packs/day for 22.7 years (15.0 ttl pk-yrs)     Types: Cigarettes     Start date: 2008     Quit date: 2023     Years since quittin.7     Passive exposure: Never    Smokeless tobacco: Never   Vaping Use    Vaping status: Former    Start date: 2018    Quit date: 2023    Substances: Nicotine, THC   Substance Use Topics    Alcohol use: Not Currently     Comment: Former alcoholic. Last use in     Drug use: Not Currently     Types: \"Crack\" cocaine, Marijuana     Comment: Current use medical marijuana. Not currently using crack cocaine.      E-Cigarette/Vaping    E-Cigarette Use Former User     Start Date 18     Quit Date 23       E-Cigarette/Vaping Substances    Nicotine Yes     THC Yes     CBD No     Flavoring No     Other No     Unknown No       I have reviewed and agree with the history as documented.     9-year-old male with multiple chronic medical issues including trach and PEG on baseline ventilator at home, presents after family noted acute agitation.  Patient has nonverbal at baseline and only communicates with shrugging of shoulders, shaking and nodding head and thumbs up and down.  Patient became acutely agitated and was attempting to strike his family after getting his dose of Seroquel.  Prior to that, family reports that he was at his baseline with no issues.  No prodromal symptoms " including URI symptoms, fever, chills, or other complaints noted.        Review of Systems   Unable to perform ROS: Patient nonverbal           Objective       ED Triage Vitals   Temperature Pulse Blood Pressure Respirations SpO2 Patient Position - Orthostatic VS   03/04/25 0200 03/04/25 0002 03/04/25 0002 03/04/25 0009 03/04/25 0002 03/04/25 0002   (!) 96.9 °F (36.1 °C) 82 94/56 16 97 % Sitting      Temp Source Heart Rate Source BP Location FiO2 (%) Pain Score    03/04/25 0200 03/04/25 0002 03/04/25 0002 -- --    Axillary Monitor Right arm        Vitals      Date and Time Temp Pulse SpO2 Resp BP Pain Score FACES Pain Rating User   03/04/25 0630 -- 45 100 % -- 136/93 -- -- MM   03/04/25 0600 -- 58 100 % -- 134/91 -- -- MM   03/04/25 0500 -- 56 100 % -- 140/95 -- -- MM   03/04/25 0430 -- 50 100 % -- 139/95 -- -- MM   03/04/25 0400 -- 50 100 % -- 136/92 -- -- MM   03/04/25 0318 -- 58 100 % 16 -- -- -- MD   03/04/25 0300 -- 59 99 % 16 118/83 -- -- [facility]   03/04/25 0200 96.9 °F (36.1 °C) 69 100 % 16 110/71 -- -- [facility]   03/04/25 0130 -- 70 99 % 16 107/69 -- -- [facility]   03/04/25 0030 -- 85 100 % 16 111/68 -- -- [facility]   03/04/25 0009 -- 84 98 % 16 -- -- -- MD   03/04/25 0002 -- 82 97 % -- 94/56 -- -- DIPTI            Physical Exam  Vitals and nursing note reviewed.   Constitutional:       General: He is not in acute distress.     Appearance: He is ill-appearing (Chronic). He is not diaphoretic.   HENT:      Head: Normocephalic and atraumatic.      Right Ear: External ear normal.      Left Ear: External ear normal.      Nose: Nose normal. No congestion or rhinorrhea.      Mouth/Throat:      Mouth: Mucous membranes are dry.   Neck:      Comments: Trach in place, 5 XLT cuffed  Cardiovascular:      Rate and Rhythm: Normal rate and regular rhythm.      Pulses: Normal pulses.      Heart sounds: Normal heart sounds.   Pulmonary:      Effort: Pulmonary effort is normal. No respiratory distress.      Breath sounds: No wheezing or rhonchi.    Abdominal:      Palpations: Abdomen is soft.      Tenderness: There is no abdominal tenderness.      Comments: PEG tube in place, well-appearing no signs of infection.   Genitourinary:     Penis: Normal.    Skin:     Findings: Lesion (Left thumb lesion, family notes it is chronic and he continues to aggravate it.) present.   Neurological:      Mental Status: He is disoriented.         Results Reviewed       Procedure Component Value Units Date/Time    Procalcitonin [[id number]] Collected: 03/04/25 0039    Lab Status: In process Specimen: Blood from Arm, Right Updated: 03/04/25 0650    Blood gas, venous [[id number]]  (Abnormal) Collected: 03/04/25 0355    Lab Status: Final result Specimen: Blood from Arm, Left Updated: 03/04/25 0403     pH, Rasheed 7.412     pCO2, Rasheed 53.7 mm Hg      pO2, Rasheed 50.3 mm Hg      HCO3, Rasheed 33.4 mmol/L      Base Excess, Rasheed 7.6 mmol/L      O2 Content, Rasheed 12.7 ml/dL      O2 HGB, VENOUS 83.6 %     UA w Reflex to Microscopic w Reflex to Culture [[id number]] Collected: 03/04/25 0325    Lab Status: Final result Specimen: Urine, Straight Cath Updated: 03/04/25 0338     Color, UA Light Yellow     Clarity, UA Clear     Specific Gravity, UA 1.013     pH, UA 6.5     Leukocytes, UA Negative     Nitrite, UA Negative     Protein, UA Negative mg/dl      Glucose, UA Negative mg/dl      Ketones, UA Negative mg/dl      Urobilinogen, UA <2.0 mg/dl      Bilirubin, UA Negative     Occult Blood, UA Negative    Lactic acid [[id number]]  (Normal) Collected: 03/04/25 0136    Lab Status: Final result Specimen: Blood from Arm, Left Updated: 03/04/25 0204     LACTIC ACID 1.1 mmol/L     Narrative:      Result may be elevated if tourniquet was used during collection.    Protime-INR [[id number]]  (Normal) Collected: 03/04/25 0136    Lab Status: Final result Specimen: Blood from Arm, Right Updated: 03/04/25 0159     Protime 14.8 seconds      INR 1.08    Narrative:      INR Therapeutic Range    Indication                                              INR Range      Atrial Fibrillation                                               2.0-3.0  Hypercoagulable State                                    2.0.2.3  Left Ventricular Asist Device                            2.0-3.0  Mechanical Heart Valve                                  -    Aortic(with afib, MI, embolism, HF, LA enlargement,    and/or coagulopathy)                                     2.0-3.0 (2.5-3.5)     Mitral                                                             2.5-3.5  Prosthetic/Bioprosthetic Heart Valve               2.0-3.0  Venous thromboembolism (VTE: VT, PE        2.0-3.0    APTT [[id number]]  (Normal) Collected: 03/04/25 0136    Lab Status: Final result Specimen: Blood from Arm, Right Updated: 03/04/25 0159     PTT 29 seconds     Blood culture #1 [[id number]] Collected: 03/04/25 0045    Lab Status: In process Specimen: Blood from Arm, Right Updated: 03/04/25 0142    Blood culture #2 [[id number]] Collected: 03/04/25 0136    Lab Status: In process Specimen: Blood from Arm, Left Updated: 03/04/25 0142    Comprehensive metabolic panel [[id number]]  (Abnormal) Collected: 03/04/25 0039    Lab Status: Final result Specimen: Blood from Arm, Right Updated: 03/04/25 0110     Sodium 136 mmol/L      Potassium 3.7 mmol/L      Chloride 94 mmol/L      CO2 34 mmol/L      ANION GAP 8 mmol/L      BUN 16 mg/dL      Creatinine 0.46 mg/dL      Glucose 105 mg/dL      Calcium 9.3 mg/dL      AST 13 U/L      ALT 13 U/L      Alkaline Phosphatase 91 U/L      Total Protein 7.7 g/dL      Albumin 3.8 g/dL      Total Bilirubin 0.41 mg/dL      eGFR 143 ml/min/1.73sq m     Narrative:      National Kidney Disease Foundation guidelines for Chronic Kidney Disease (CKD):     Stage 1 with normal or high GFR (GFR > 90 mL/min/1.73 square meters)    Stage 2 Mild CKD (GFR = 60-89 mL/min/1.73 square meters)    Stage 3A Moderate CKD (GFR = 45-59 mL/min/1.73 square meters)    Stage 3B Moderate CKD (GFR = 30-44  mL/min/1.73 square meters)    Stage 4 Severe CKD (GFR = 15-29 mL/min/1.73 square meters)    Stage 5 End Stage CKD (GFR <15 mL/min/1.73 square meters)  Note: GFR calculation is accurate only with a steady state creatinine    Blood gas, venous [[id number]]  (Abnormal) Collected: 03/04/25 0039    Lab Status: Final result Specimen: Blood from Arm, Right Updated: 03/04/25 0053     pH, Rasheed 7.398     pCO2, Rasheed 50.4 mm Hg      pO2, Rasheed 153.4 mm Hg      HCO3, Rasheed 30.4 mmol/L      Base Excess, Rasheed 4.7 mmol/L      O2 Content, Rasheed 15.2 ml/dL      O2 HGB, VENOUS 97.4 %     CBC and differential [[id number]]  (Abnormal) Collected: 03/04/25 0039    Lab Status: Final result Specimen: Blood from Arm, Right Updated: 03/04/25 0052     WBC 7.21 Thousand/uL      RBC 3.55 Million/uL      Hemoglobin 10.2 g/dL      Hematocrit 33.1 %      MCV 93 fL      MCH 28.7 pg      MCHC 30.8 g/dL      RDW 15.9 %      MPV 10.1 fL      Platelets 248 Thousands/uL      nRBC 0 /100 WBCs      Segmented % 75 %      Immature Grans % 0 %      Lymphocytes % 14 %      Monocytes % 10 %      Eosinophils Relative 0 %      Basophils Relative 1 %      Absolute Neutrophils 5.42 Thousands/µL      Absolute Immature Grans 0.03 Thousand/uL      Absolute Lymphocytes 0.98 Thousands/µL      Absolute Monocytes 0.70 Thousand/µL      Eosinophils Absolute 0.03 Thousand/µL      Basophils Absolute 0.05 Thousands/µL             XR chest 1 view portable   ED Interpretation by Keny Ward DO (03/04 0133)   Stable appearing, chronic effusions.  No acute disease.          ECG 12 Lead Documentation Only    Date/Time: 3/4/2025 6:49 AM    Performed by: Keny Ward DO  Authorized by: Keny Ward DO    Indications / Diagnosis:  Agitation  Patient location:  ED  Interpretation:     Interpretation: normal    Rate:     ECG rate:  86    ECG rate assessment: normal    Rhythm:     Rhythm: sinus rhythm    Ectopy:     Ectopy: none    QRS:     QRS axis:  Normal     "QRS intervals:  Normal  Conduction:     Conduction: normal    ST segments:     ST segments:  Normal  T waves:     T waves: normal        ED Medication and Procedure Management   Prior to Admission Medications   Prescriptions Last Dose Informant Patient Reported? Taking?   Incontinence Supply Disposable (Comfort Shield Adult Diapers) MISC  Family Member No No   Sig: Use 1 each 4 (four) times a day   NEEDLE, DISP, 18 G 18G X 1\" MISC   No No   Sig: Use 2 (two) times a week   Oral Hygiene Products (Toothette Swabs/Dentifrice) SWAB  Family Member No No   Sig: Apply to the mouth or throat 3 (three) times a day   QUEtiapine (SEROquel) 200 mg tablet   No No   Si tablet (200 mg total) by Per G Tube route daily at bedtime   Syringe/Needle, Disp, (Syringe Luer Slip) 25G X 5/8\" 1 ML MISC   No No   Sig: Use 2 (two) times a week   acetylcysteine (MUCOMYST) 200 mg/mL nebulizer solution   No No   Sig: Take 3 mL (600 mg total) by nebulization every 6 (six) hours   apixaban (ELIQUIS) 5 mg  Family Member No No   Si tablet (5 mg total) by Per PEG Tube route 2 (two) times a day   ipratropium-albuterol (DUO-NEB) 0.5-2.5 mg/3 mL nebulizer solution   No No   Sig: Take 3 mL by nebulization every 6 (six) hours      Facility-Administered Medications: None     Patient's Medications   Discharge Prescriptions    No medications on file     No discharge procedures on file.  ED SEPSIS DOCUMENTATION   Time reflects when diagnosis was documented in both MDM as applicable and the Disposition within this note       Time User Action Codes Description Comment    3/4/2025  3:42 AM Keny Ward Add [Z93.0] Tracheostomy present (HCC)     3/4/2025  3:42 AM Keny Ward Add [R45.1] Agitation     3/4/2025  3:42 AM Keny Ward Modify [Z93.0] Tracheostomy present (HCC)     3/4/2025  3:42 AM Keny Ward Modify [R45.1] Agitation                  Keny Ward DO  25 0700    "

## 2025-03-04 NOTE — RESTRAINT FACE TO FACE
Restraint Face to Face   Hemanth ANSELMO Kiki 39 y.o. male MRN: [id number]  Unit/Bed#: ED-43 Encounter: [id number]      Physical Evaluation:  Sedated  Purpose for Restraints/ Seclusion High risk for causing significant disruption of treatment environment   Patient's reaction to the intervention:  Tolerated well  Patient's medical condition:  Agitation  Restraints to be Terminated

## 2025-03-04 NOTE — TELEPHONE ENCOUNTER
Consult placed on [facility] queue at 1342 to be seen by an [facility] psychiatrist.

## 2025-03-04 NOTE — ASSESSMENT & PLAN NOTE
S/p trach/ vent dependent for neuromuscular disease  Of note- Chronic settings of 18/500/8/40%  In the ED- VB.41-53.7-50.3-33.4  5.0 XLT Jie roberts.  However, of note 6.0  Known history of mucous plugging & intentional holding breath    Plan:  Continue full vent support  Pulmonary toilet; continue home duo-nebs and 3% nebs q6h and chest PT TID   Maintain O2 sat 90% or above   VAP preventions; chlorhexidine, PPI, HOB greater than 30 degrees

## 2025-03-04 NOTE — EMTALA/ACUTE CARE TRANSFER
[facility] EMERGENCY DEPARTMENT  [address] [address]  Dept: [phone]      EMTALA TRANSFER CONSENT    NAME Hemanth Swanson                                         1985                              MRN [id number]    I have been informed of my rights regarding examination, treatment, and transfer   by Dr. Bashir Cook MD    Benefits: Specialized equipment and/or services available at the receiving facility (Include comment)________________________    Risks: Potential for delay in receiving treatment, Potential deterioration of medical condition, Loss of IV      Consent for Transfer:  I acknowledge that my medical condition has been evaluated and explained to me by the emergency department physician or other qualified medical person and/or my attending physician, who has recommended that I be transferred to the service of  Accepting Physician: Dr. Chung-Sayed at Accepting Facility Name, City & State : [facility]. The above potential benefits of such transfer, the potential risks associated with such transfer, and the probable risks of not being transferred have been explained to me, and I fully understand them.  The doctor has explained that, in my case, the benefits of transfer outweigh the risks.  I agree to be transferred.    I authorize the performance of emergency medical procedures and treatments upon me in both transit and upon arrival at the receiving facility.  Additionally, I authorize the release of any and all medical records to the receiving facility and request they be transported with me, if possible.  I understand that the safest mode of transportation during a medical emergency is an ambulance and that the Hospital advocates the use of this mode of transport. Risks of traveling to the receiving facility by car, including absence of medical control, life sustaining equipment, such as oxygen, and medical personnel has been explained  to me and I fully understand them.    (RILEY CORRECT BOX BELOW)  [  ]  I consent to the stated transfer and to be transported by ambulance/helicopter.  [  ]  I consent to the stated transfer, but refuse transportation by ambulance and accept full responsibility for my transportation by car.  I understand the risks of non-ambulance transfers and I exonerate the Hospital and its staff from any deterioration in my condition that results from this refusal.    X___________________________________________    DATE  25  TIME________  Signature of patient or legally responsible individual signing on patient behalf           RELATIONSHIP TO PATIENT_________________________          Provider Certification    NAME Hemanth Swanson                                         1985                              MRN [id number]    A medical screening exam was performed on the above named patient.  Based on the examination:    Condition Necessitating Transfer The primary encounter diagnosis was Agitation. A diagnosis of Tracheostomy present (HCC) was also pertinent to this visit.    Patient Condition: The patient has been stabilized such that within reasonable medical probability, no material deterioration of the patient condition or the condition of the unborn child(capri) is likely to result from the transfer    Reason for Transfer: Level of Care needed not available at this facility    Transfer Requirements: [place] [place]   Space available and qualified personnel available for treatment as acknowledged by Margi  Agreed to accept transfer and to provide appropriate medical treatment as acknowledged by       Dr. Holden  Appropriate medical records of the examination and treatment of the patient are provided at the time of transfer   STAFF INITIAL WHEN COMPLETED _______  Transfer will be performed by qualified personnel from [facility]  and appropriate transfer equipment as required, including the use of  necessary and appropriate life support measures.    Provider Certification: I have examined the patient and explained the following risks and benefits of being transferred/refusing transfer to the patient/family:  General risk, such as traffic hazards, adverse weather conditions, rough terrain or turbulence, possible failure of equipment (including vehicle or aircraft), or consequences of actions of persons outside the control of the transport personnel, Unanticipated needs of medical equipment and personnel during transport, Risk of worsening condition      Based on these reasonable risks and benefits to the patient and/or the unborn child(capri), and based upon the information available at the time of the patient’s examination, I certify that the medical benefits reasonably to be expected from the provision of appropriate medical treatments at another medical facility outweigh the increasing risks, if any, to the individual’s medical condition, and in the case of labor to the unborn child, from effecting the transfer.    X____________________________________________ DATE 03/04/25        TIME_______      ORIGINAL - SEND TO MEDICAL RECORDS   COPY - SEND WITH PATIENT DURING TRANSFER

## 2025-03-04 NOTE — CASE MANAGEMENT
Case Management Assessment & Discharge Planning Note    Patient name Hemanth Swanson  Location ICU 03/ICU 03 MRN [id number]  : 1985 Date 3/4/2025       Current Admission Date: 3/4/2025  Current Admission Diagnosis:Chronic respiratory failure with hypoxia and hypercapnia (HCC)   Patient Active Problem List    Diagnosis Date Noted Date Diagnosed    Copious oral secretions 2025     Mood disorder (HCC) 2024     Abnormal movements 2024     Agitation 2024     History of pulmonary embolus (PE) 2024     Rash of back 10/25/2024     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HCC) 10/17/2024     Palliative care by specialist 10/11/2024     Cardiac arrest due to other underlying condition ([facility]) 2024     Seizure-like activity ([facility]) 2024     Bradycardia 2024     Ventilator dependent ([facility]) 2024     Obesity hypoventilation syndrome ([facility]) 10/24/2023     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     Impaired mobility 2023     Status post tracheostomy ([facility]) 2023     S/P percutaneous endoscopic gastrostomy (PEG) tube placement ([facility]) 2023     Anxiety 2023     Chronic respiratory failure with hypoxia and hypercapnia ([facility]) 2023     Depression 2023     History of LVH (left ventricular hypertrophy) 2023     Pure hypertriglyceridemia 2023     Unilateral vestibular schwannoma (HCC) 2023     Port-A-Cath in place 2023     Diplopia 2023     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HCC) 2023     Umbilical hernia without obstruction and without gangrene 12/10/2022     Tobacco use 12/10/2022     Retroperitoneal lymphadenopathy 12/10/2022       LOS (days): 0  Geometric Mean LOS (GMLOS) (days):   Days to GMLOS:     OBJECTIVE:  PATIENT READMITTED TO HOSPITAL  Risk of Unplanned Readmission Score: 35.9         Current admission status: Inpatient       Preferred Pharmacy:   [facility]/pharmacy #[id number] - [place] - [address] [address]  [facility] [address]  Phone: [phone] Fax: [phone]    Primary Care Provider: Ela Douglas MD    Primary Insurance: [facility]  Secondary Insurance:     ASSESSMENT:  Active Health Care Proxies       Dmitry Swanson Health Care Representative - Sister   Primary Phone: [phone] (Mobile)                 Advance Directives  Does patient have a Health Care POA?: Yes  Does patient have Advance Directives?: Yes  Advance Directives: Power of  for health care  Primary Contact: Dmitry Swanson (sister)  [phone]       Readmission Root Cause  30 Day Readmission: Yes  During your hospital stay, did someone (provider, nurse, ) explain your care to you in a way you could understand?: Yes  Did you feel medically stable to leave the hospital?: Yes  Were you able to pay for your medication at the pharmacy?: Yes  Did you have reliable transportation to take you to your appointments?: Yes  During previous admission, was a post-acute recommendation made?: Yes  What post-acute resources were offered?: [facility]  Patient was readmitted due to: Trach issues  Action Plan: Critical care for vent, Psych consult, 1:1    Patient Information  Admitted from:: Facility ([facility])  Mental Status: Other (Comment) (agitaterd - trach to vent)  During Assessment patient was accompanied by: Not accompanied during assessment, Other-Comment (1:1 aide)  Assessment information provided by::  (chart review)  Primary Caregiver: Family  Caregiver's Name:: Dmitry Swanson (sister) [phone]  Support Systems: Family members  County of Residence: [place]  What city do you live in?: [facility] entry access options. Select all that apply.: Ramp  Type of Current Residence: 2 story home  Upon entering residence, is there a bedroom on the main floor (no further steps)?: Yes  Upon entering residence, is there a bathroom on the main floor (no further steps)?: Yes  Living Arrangements: Lives w/  Family members  Is patient a ?: No    Activities of Daily Living Prior to Admission  Functional Status: Total dependent  Completes ADLs independently?: No  Level of ADL dependence: Total Dependent  Ambulates independently?: No  Level of ambulatory dependence: Total Dependent  Does patient use assisted devices?: Yes  Assisted Devices (DME) used: Hospital Bed, Wallace lift, Wheelchair, Nebulizer, Home Oxygen concentrator, Portable Oxygen concentrator (tube feeding, pressure mattress)  DME Company Name (respiratory supplies): [employer]  Does patient currently own DME?: Yes  What DME does the patient currently own?: Hospital Bed, Wallace lift, Nebulizer, Portable Oxygen concentrator, Home Oxygen concentrator, Wheelchair  Does patient have a history of Outpatient Therapy (PT/OT)?: No  Does the patient have a history of Short-Term Rehab?: No  Does patient have a history of HHC?: Yes  Does patient currently have HHC?: Yes    Current Home Health Care  Type of Current Home Care Services: Home PT, Home OT    Patient Information Continued  Income Source: SSI/SSD  Does patient have prescription coverage?: Yes  Does patient receive dialysis treatments?: No  Does patient have a history of substance abuse?: Yes  Historical substance use preference: Alcohol/ETOH  Is patient currently in treatment for substance abuse?: N/A - sober  Does patient have a history of Mental Health Diagnosis?: Yes (Bipolar, Anxiety, depression, psychiatric disorder)  Is patient receiving treatment for mental health?: Yes (medication management)       Means of Transportation  Means of Transport to [facility]:: Family transport      Social Determinants of Health (SDOH)      Flowsheet Row Most Recent Value   Housing Stability    In the last 12 months, was there a time when you were not able to pay the mortgage or rent on time? N   In the past 12 months, how many times have you moved where you were living? 1   At any time in the past 12 months, were you  homeless or living in a shelter (including now)? N   Transportation Needs    In the past 12 months, has lack of transportation kept you from medical appointments or from getting medications? no   In the past 12 months, has lack of transportation kept you from meetings, work, or from getting things needed for daily living? No   Food Insecurity    Within the past 12 months, you worried that your food would run out before you got the money to buy more. Never true   Within the past 12 months, the food you bought just didn't last and you didn't have money to get more. Never true   Utilities    In the past 12 months has the electric, gas, oil, or water company threatened to shut off services in your home? No            DISCHARGE DETAILS:       Contacts  Patient Contacts: Dmitry Swanson (sister)  Relationship to Patient:: Family  Contact Method: Phone  Phone Number: [phone]  Reason/Outcome: Discharge Planning, Emergency Contact, Continuity of Care    Requested Home Health Care         Is the patient interested in HHC at discharge?: Yes  Home Health Discipline requested:: Home Health Aide  Homebound Criteria Met:: Uses an Assist Device (i.e. cane, walker, etc), Requires the Assistance of Another Person for Safe Ambulation or to Leave the Home  Supporting Clincal Findings:: Bed Bound or Wheelchair Bound, Requires Oxygen        Additional Comments: Patient remains agitated and uncooperative, family is not at the bedside. Intake assessment completed through chart review since patient was a transfer from [facility]. See transfer notes. CM will contact family for intake and discharge planning. CM department will continue to follow patient for discharge planning.

## 2025-03-04 NOTE — RESPIRATORY THERAPY NOTE
03/04/25 0009   Respiratory Assessment   Assessment Type Assess only   General Appearance Eyes open/responds to pain   Respiratory Pattern Assisted   Chest Assessment Chest expansion symmetrical   Suction Trach   Resp Comments (S)  pt brought in by ems on home ventilator. RT used home settings and placed on hosital c-1 ventilator. setttings are as charted. Pt has a 5.0 XLT shiley. Spare 5.0 XLT shiley is bedside. Pt is extremly combative and trying to rip out trach. pt chart states he has 6.0 xlt shiley but pt has a 5.0 XLT shiley   O2 Device vent   Airway Suctioning/Secretions   Suction Type Tracheal   Suction Device Catheter   Secretion Amount Small   Secretion Color White   Secretion Consistency Thin   Suction Tolerance Tolerated well   Suctioning Adverse Effects None   Additional Assessments   Pulse 84   Respirations 16   SpO2 98 %   $ Vital Capacity Mech/Peak Flow Yes

## 2025-03-04 NOTE — PLAN OF CARE
Problem: PAIN - ADULT  Goal: Verbalizes/displays adequate comfort level or baseline comfort level  Description: Interventions:  - Encourage patient to monitor pain and request assistance  - Assess pain using appropriate pain scale  - Administer analgesics based on type and severity of pain and evaluate response  - Implement non-pharmacological measures as appropriate and evaluate response  - Consider cultural and social influences on pain and pain management  - Notify physician/advanced practitioner if interventions unsuccessful or patient reports new pain  Outcome: Progressing     Problem: SAFETY ADULT  Goal: Patient will remain free of falls  Description: INTERVENTIONS:  - Educate patient/family on patient safety including physical limitations  - Instruct patient to call for assistance with activity   - Consult OT/PT to assist with strengthening/mobility   - Keep Call bell within reach  - Keep bed low and locked with side rails adjusted as appropriate  - Keep care items and personal belongings within reach  - Initiate and maintain comfort rounds  - Make Fall Risk Sign visible to staff  - Offer Toileting every 2 Hours, in advance of need  - Initiate/Maintain bed alarm  - Obtain necessary fall risk management equipment: call bell, padded side rails, restraints.   - Apply yellow socks and bracelet for high fall risk patients  - Consider moving patient to room near nurses station  Outcome: Progressing  Goal: Maintain or return to baseline ADL function  Description: INTERVENTIONS:  -  Assess patient's ability to carry out ADLs; assess patient's baseline for ADL function and identify physical deficits which impact ability to perform ADLs (bathing, care of mouth/teeth, toileting, grooming, dressing, etc.)  - Assess/evaluate cause of self-care deficits   - Assess range of motion  - Assess patient's mobility; develop plan if impaired  - Assess patient's need for assistive devices and provide as appropriate  - Encourage  maximum independence but intervene and supervise when necessary  - Involve family in performance of ADLs  - Assess for home care needs following discharge   - Consider OT consult to assist with ADL evaluation and planning for discharge  - Provide patient education as appropriate  Outcome: Progressing  Goal: Maintains/Returns to pre admission functional level  Description: INTERVENTIONS:  - Perform AM-PAC 6 Click Basic Mobility/ Daily Activity assessment daily.  - Set and communicate daily mobility goal to care team and patient/family/caregiver.   - Collaborate with rehabilitation services on mobility goals if consulted  - Perform Range of Motion 4 times a day.  - Reposition patient every 2 hours.  - Dangle patient 0 times a day  - Stand patient 0 times a day  - Ambulate patient 0 times a day  - Out of bed to chair 0 times a day   - Out of bed for meals 0 times a day  - Out of bed for toileting  - Record patient progress and toleration of activity level   Outcome: Progressing     Problem: DISCHARGE PLANNING  Goal: Discharge to home or other facility with appropriate resources  Description: INTERVENTIONS:  - Identify barriers to discharge w/patient and caregiver  - Arrange for needed discharge resources and transportation as appropriate  - Identify discharge learning needs (meds, wound care, etc.)  - Arrange for interpretive services to assist at discharge as needed  - Refer to Case Management Department for coordinating discharge planning if the patient needs post-hospital services based on physician/advanced practitioner order or complex needs related to functional status, cognitive ability, or social support system  Outcome: Progressing     Problem: Knowledge Deficit  Goal: Patient/family/caregiver demonstrates understanding of disease process, treatment plan, medications, and discharge instructions  Description: Complete learning assessment and assess knowledge base.  Interventions:  - Provide teaching at level of  understanding  - Provide teaching via preferred learning methods  Outcome: Progressing     Problem: SAFETY,RESTRAINT: NV/NON-SELF DESTRUCTIVE BEHAVIOR  Goal: Remains free of harm/injury (restraint for non violent/non self-detsructive behavior)  Description: INTERVENTIONS:  - Instruct patient/family regarding restraint use   - Assess and monitor physiologic and psychological status   - Provide interventions and comfort measures to meet assessed patient needs   - Identify and implement measures to help patient regain control  - Assess readiness for release of restraint   Outcome: Progressing  Goal: Returns to optimal restraint-free functioning  Description: INTERVENTIONS:  - Assess the patient's behavior and symptoms that indicate continued need for restraint  - Identify and implement measures to help patient regain control  - Assess readiness for release of restraint   Outcome: Progressing     Problem: NEUROSENSORY - ADULT  Goal: Achieves stable or improved neurological status  Description: INTERVENTIONS  - Monitor and report changes in neurological status  - Monitor vital signs such as temperature, blood pressure, glucose, and any other labs ordered   - Initiate measures to prevent increased intracranial pressure  - Monitor for seizure activity and implement precautions if appropriate      Outcome: Progressing     Problem: CARDIOVASCULAR - ADULT  Goal: Absence of cardiac dysrhythmias or at baseline rhythm  Description: INTERVENTIONS:  - Continuous cardiac monitoring, vital signs, obtain 12 lead EKG if ordered  - Administer antiarrhythmic and heart rate control medications as ordered  - Monitor electrolytes and administer replacement therapy as ordered  Outcome: Progressing     Problem: RESPIRATORY - ADULT  Goal: Achieves optimal ventilation and oxygenation  Description: INTERVENTIONS:  - Assess for changes in respiratory status  - Assess for changes in mentation and behavior  - Position to facilitate oxygenation and  minimize respiratory effort  - Oxygen administered by appropriate delivery if ordered  - Initiate smoking cessation education as indicated  - Encourage broncho-pulmonary hygiene including cough, deep breathe, Incentive Spirometry  - Assess the need for suctioning and aspirate as needed  - Assess and instruct to report SOB or any respiratory difficulty  - Respiratory Therapy support as indicated  Outcome: Progressing     Problem: GENITOURINARY - ADULT  Goal: Absence of urinary retention  Description: INTERVENTIONS:  - Assess patient’s ability to void and empty bladder  - Monitor I/O  - Bladder scan as needed  - Discuss with physician/AP medications to alleviate retention as needed  - Discuss catheterization for long term situations as appropriate  Outcome: Progressing     Problem: MUSCULOSKELETAL - ADULT  Goal: Maintain or return mobility to safest level of function  Description: INTERVENTIONS:  - Assess patient's ability to carry out ADLs; assess patient's baseline for ADL function and identify physical deficits which impact ability to perform ADLs (bathing, care of mouth/teeth, toileting, grooming, dressing, etc.)  - Assess/evaluate cause of self-care deficits   - Assess range of motion  - Assess patient's mobility  - Assess patient's need for assistive devices and provide as appropriate  - Encourage maximum independence but intervene and supervise when necessary  - Involve family in performance of ADLs  - Assess for home care needs following discharge   - Consider OT consult to assist with ADL evaluation and planning for discharge  - Provide patient education as appropriate  Outcome: Progressing

## 2025-03-04 NOTE — ED ATTENDING ATTESTATION
3/3/2025  I, Bashir Cook MD, saw and evaluated the patient. I have discussed the patient with the resident/non-physician practitioner and agree with the resident's/non-physician practitioner's findings, Plan of Care, and MDM as documented in the resident's/non-physician practitioner's note, except where noted. All available labs and Radiology studies were reviewed.  I was present for key portions of any procedure(s) performed by the resident/non-physician practitioner and I was immediately available to provide assistance.       At this point I agree with the current assessment done in the Emergency Department.  I have conducted an independent evaluation of this patient a history and physical is as follows:  Briefly, 39-year-old male with history of prior suicide attempts, mood disorder, demyelinating polyneuropathy as well as tracheostomy and PEG tube brought in by EMS due to agitation and attempting to pull  tracheostomy tube out at home.  Patient has had prior similar episodes, per family seems more agitated than usual, was attempted to pull out his tracheostomy tube.  He was given an increased dose of Seroquel, however continued to be agitated.  Patient very agitated during transport, given 4 mg of Versed IM by EMS prior to arrival.  On arrival, patient unable or unwilling to speak, shaking his head, attempting to kick staff, spitting at staff.  Patient also attempting to pull tracheostomy tube out.  On examination, patient agitated, moving left upper extremity as well as bilateral lower extremities with good strength, right upper extremity not moving, at chronic baseline.  Tracheostomy tube in place, PEG tube in place in the midline upper stomach.  Heart sounds normal, lungs clear to auscultation, abdomen soft, umbilical hernia noted and contains bowel but easily reducible.  A laceration is noted to the dorsal aspect of the left thumb, appears acute on chronic, currently hemostatic and not amenable to  repair.  Attempted to verbally de-escalate the patient, unfortunately this was unsuccessful, initially placed in soft restraints, however with soft restraints he was able to grasp with the suction extension attach the tracheostomy and successfully disconnected it in an apparent attempt to pull out his tracheostomy tube.  Patient was briefly placed in locked restraints, given additional 5 mg of Versed IV, after that resting peacefully.  Workup initiated to rule out infectious cause of possible decompensation or agitation, labs overall reassuring mild anemia noted, also elevated oxygen on VBG, possibly due to supplemental oxygen given en route.  Chest x-ray shows no acute disease.  Patient started on Precedex drip for continued sedation, discussed with ICU for admission, unfortunately no ICU bed available, transfer initiated at ICU request.  Patient hemodynamically stable, sedated on Precedex drip at time of departure via ambulance for ICU at [facility].  ED Course       Critical Care Time  Critical Care Time Statement: Upon my evaluation, this patient had a high probability of imminent or life-threatening deterioration due to altered mental status, which required my direct attention, intervention, and personal management.  I spent a total of 35 minutes directly providing critical care services, including interpretation of complex medical databases, evaluating for the presence of life-threatening injuries or illnesses, management of organ system failure(s) , complex medical decision making (to support/prevent further life-threatening deterioration)., interpretation of hemodynamic data, titration of continuous IV medications (drips), and ventilator management. This time is exclusive of procedures, teaching, treating other patients, family meetings, and any prior time recorded by providers other than myself.

## 2025-03-04 NOTE — ASSESSMENT & PLAN NOTE
Of note, this is a recurrent issue. Patient frequently tried to pull out trach/PEG during combative/agitated episodes.  There was initial concern regarding SI versus suicidal attempt per last admission notes patient was cleared by psych.  Patient received total 9 mg of Versed and was started on Precedex drip for his agitation.  Upon arrival [place] ICU-patient was easily agitated/ uncooperative.    Plan:  Continue home Seroquel 200mg QHS  Psychiatry consult  Restraint as needed.  One-to-one  As needed Zyprexa/ Ativan - QTc within normal.  Monitor on Tele  Routinely check EKG

## 2025-03-04 NOTE — H&P
H&P - Critical Care/ICU   Name: Hemanth Swanson 39 y.o. male I MRN: [id number]  Unit/Bed#: ICU 03 I Date of Admission: 3/4/2025   Date of Service: 3/4/2025 I Hospital Day: 0       Assessment & Plan  Chronic respiratory failure with hypoxia and hypercapnia (HCC)  S/p trach/ vent dependent for neuromuscular disease  Of note- Chronic settings of 18/500/8/40%  In the ED- VB.41-53.7-50.3-33.4  5.0 XLT Shiley trach.  However, of note 6.0  Known history of mucous plugging & intentional holding breath    Plan:  Continue full vent support  Pulmonary toilet; continue home duo-nebs and 3% nebs q6h and chest PT TID   Maintain O2 sat 90% or above   VAP preventions; chlorhexidine, PPI, HOB greater than 30 degrees  Agitation  Of note, this is a recurrent issue. Patient frequently tried to pull out trach/PEG during combative/agitated episodes.  There was initial concern regarding SI versus suicidal attempt per last admission notes patient was cleared by psych.  Patient received total 9 mg of Versed and was started on Precedex drip for his agitation.  Upon arrival [facility] ICU-patient was easily agitated/ uncooperative.    Plan:  Continue home Seroquel 200mg QHS  Psychiatry consult  Restraint as needed.  One-to-one  As needed Zyprexa/ Ativan - QTc within normal.  Monitor on Tele  Routinely check EKG  Mood disorder (HCC)  Seen above.  Ventilator dependent (HCC)  Patient and family wish to stay in the home  Family very capable of caring for patient.  However, in the setting of recurrent episodes of agitation, pulling out trach/PEG - it appears to be more appropriate for patient to stay in long-term care facility- Discussed with sister/ Caregiver Dmitry- she verbalized patient mentioned before that he would die instead of staying in the facility and she refused potential LTAC placement clearly.  History of pulmonary embolus (PE)  Continue home Eliquis  Mixed axonal-demyelinating polyneuropathy  Follows with neurology  Supportive  care  Status post tracheostomy (HCC)  Noted history. Vent dependent  S/P percutaneous endoscopic gastrostomy (PEG) tube placement (HCC)  Recently replaced 2/13 after self removal  Continue tube feedings at goal   Bradycardia  New onset- likely 2/2 to precedex gtt.  D/c precedex  Monitor HR  Disposition: Critical care    History of Present Illness   Hemanth Swanson is a 39 y.o. male with a history of mixed axonal demyelinating polyneuropathy, trach/vent dependent, PEG tubes in place, history of PE on Eliquis, non-verbal who was brought by family to ED for acute agitation. Of note, patient was was given higher dose of Seroquel 250 mg (normally 200mg) and then became combative and trying to kick/strike his family.  Patient was also tried to pull out his trach and PEG.    Patient received Versed 4 mg per EMS and additional 5mg in the ED but was still very uncooperative/combative.  Patient was started on low-dose Precedex.    Of note, patient had multiple episode of similar presentation.  There was initial concern for suicidal ideation versus suicidal attempt.    In the ED, CBC, CMP unremarkable.  Pro-Scott neg.  UA negative.  Lactic acid negative.  VBG 7.39-50 0.4-153-30.4.  EKG nonacute.  CXR per my reading-stable bibasilar atelectasis.  No acute cardiopulmonary process.    Spoke with sister- No recent fever, chills. Hemanth was his normal state of health yesterday until night. No known triggers.    History obtained from chart review and caregiver sister.  Review of Systems: Review of Systems not obtainable due to AMS, agitation.    Historical Information   Past Medical History:  No date: Anxiety  No date: Cancer (HCC)  No date: Depression  10/06/2024: Hypernatremia  09/24/2023: Migration of percutaneous endoscopic gastrostomy (PEG)   tube ([facility])  No date: Psychiatric disorder      Comment:  bipolar  08/24/2023: Sepsis ([facility]) Past Surgical History:  01/20/2023: IR BIOPSY LYMPH NODE  06/18/2024: IR GASTROSTOMY (G) TUBE  "CHECK/CHANGE/REINSERTION/UPSIZE  2023: IR GASTROSTOMY TUBE PLACEMENT  2023: IR LUMBAR PUNCTURE  10/25/2024: IR LUMBAR PUNCTURE  2023: IR PORT PLACEMENT  2022: ORCHIECTOMY; Left      Comment:  Procedure: ORCHIECTOMY INGUINAL;  Surgeon: Flip Michael MD;  Location:  MAIN OR;  Service: Urology  2023: PEG W/TRACHEOSTOMY PLACEMENT; N/A      Comment:  Procedure: TRACHEOSTOMY WITH INSERTION PEG TUBE;                 Surgeon: Rudy Mcmanus MD;  Location: [facility]IN OR;                 Service: General  No date: TESTICLE SURGERY   Current Outpatient Medications   Medication Instructions    acetylcysteine (MUCOMYST) 600 mg, Nebulization, Every 6 hours    apixaban (ELIQUIS) 5 mg, Per PEG Tube, 2 times daily    Incontinence Supply Disposable (Comfort Shield Adult Diapers) MISC 1 each, Does not apply, 4 times daily    ipratropium-albuterol (DUO-NEB) 0.5-2.5 mg/3 mL nebulizer solution 3 mL, Nebulization, Every 6 hours (RESP)    NEEDLE, DISP, 18 G 18G X 1\" MISC Does not apply, 2 times weekly    Oral Hygiene Products (Toothette Swabs/Dentifrice) SWAB Mouth/Throat, 3 times daily    QUEtiapine (SEROQUEL) 200 mg, Per G Tube, Daily at bedtime    Syringe/Needle, Disp, (Syringe Luer Slip) 25G X 5/8\" 1 ML MISC Does not apply, 2 times weekly    Allergies   Allergen Reactions    Dog Epithelium Cough, Sneezing and Nasal Congestion      Social History     Tobacco Use    Smoking status: Former     Current packs/day: 0.00     Average packs/day: 0.7 packs/day for 22.7 years (15.0 ttl pk-yrs)     Types: Cigarettes     Start date: 2008     Quit date: 2023     Years since quittin.7     Passive exposure: Never    Smokeless tobacco: Never   Vaping Use    Vaping status: Former    Start date: 2018    Quit date: 2023    Substances: Nicotine, THC   Substance Use Topics    Alcohol use: Not Currently     Comment: Former alcoholic. Last use in 2016    Drug use: Not Currently     Types: " "\"Crack\" cocaine, Marijuana     Comment: Current use medical marijuana. Not currently using crack cocaine.    Family History   Problem Relation Age of Onset    Coronary artery disease Maternal Aunt     Cancer Father     Diabetes Father     Hypertension Father           Objective :                   Vitals I/O      Most Recent Min/Max in 24hrs   Temp   Temp  Min: 96.9 °F (36.1 °C)  Max: 96.9 °F (36.1 °C)   Pulse   Pulse  Min: 45  Max: 85   Resp   Resp  Min: 16  Max: 16   BP   BP  Min: 94/56  Max: 140/95   O2 Sat   SpO2  Min: 97 %  Max: 100 %    No intake or output data in the 24 hours ending 03/04/25 0830    Diet NPO    Invasive Monitoring           Physical Exam   Physical Exam  Vitals and nursing note reviewed.   Eyes:      General:         Right eye: No discharge.         Left eye: No discharge.      Comments: Eyes open   Skin:     General: Skin is warm.   HENT:      Mouth/Throat:      Mouth: Mucous membranes are moist.   Neck:      Trachea: Tracheostomy present.   Cardiovascular:      Rate and Rhythm: Regular rhythm. Bradycardia present.      Pulses: Normal pulses.   Musculoskeletal:      Right lower leg: No edema.      Left lower leg: No edema.   Abdominal: General: Bowel sounds are normal.      Palpations: Abdomen is soft.   Constitutional:       General: He is not in acute distress.  Pulmonary:      Breath sounds: Rales (bibasilar) present.      Comments: On vent  Neurological:      Mental Status: He is alert. He is agitated.   Genitourinary/Anorectal:  external catheter present.        Diagnostic Studies        Lab Results: I have reviewed the following results:     Medications:  Scheduled PRN   acetylcysteine, 3 mL, Q6H  apixaban, 5 mg, BID  chlorhexidine, 15 mL, Q12H ISAEL  ipratropium-albuterol, 3 mL, Q6H  OLANZapine, 5 mg, Once  QUEtiapine, 200 mg, HS      polyethylene glycol, 17 g, Daily PRN       Continuous            Labs:   CBC    Recent Labs     03/04/25  0039   WBC 7.21   HGB 10.2*   HCT 33.1*   PLT " 248     BMP    Recent Labs     03/04/25  0039   SODIUM 136   K 3.7   CL 94*   CO2 34*   AGAP 8   BUN 16   CREATININE 0.46*   CALCIUM 9.3       Coags    Recent Labs     03/04/25  0136   INR 1.08   PTT 29        Additional Electrolytes  No recent results       Blood Gas    No recent results  Recent Labs     03/04/25  0355   PHVEN 7.412*   PSI7OLF 53.7*   PO2VEN 50.3*   HFE5FAY 33.4*   BEVEN 7.6   [id number] 83.6*    LFTs  Recent Labs     03/04/25  0039   ALT 13   AST 13   ALKPHOS 91   ALB 3.8   TBILI 0.41       Infectious  Recent Labs     03/04/25  0039   PROCALCITONI 0.07     Glucose  Recent Labs     03/04/25  0039   GLUC 105

## 2025-03-04 NOTE — PROGRESS NOTES
Attending Attestation::{DISAPPEARING TIP I Billing Tip):55358}    I supervised the Advanced Practitioner/Resident team on 3/7/2025.? I performed, in its entirety, the assessment and plan of the visit.  I agree with the Advanced Practitioner's note with the following additions/exceptions:      Assessment:  39 y.o. male with a h/o testicular cancer, neuromuscular dysfunction, trach/PEG, PE/eliquis, non verbal at baseline/communicates by gesture/writing.,      3/3 - P/w acute agitation at home, tried to pull trach/PEG. Reported normal yesterday. To [facility] ED, required versed/combative, precedex started on vent support. No signs if infection, unremarkable labs, no hypercarbia    3/4 - psych consult, recs quetiapine 50 bid, 200 qhs, prn ativan 1mg iv q6. Off precedex/developed bradycardia     3/5 - switch quetiapine 50 bid, 200 mg hs. Family discussion at bedside/ethics/palliative consult    3/6 - palliative care consult/discussion with neurology - pt lost OP follow up/to complete work up for neuromuscular dysfunction. Replace ativan with zyprexa/started lexapro for depression, pt denied suicidal attempts.     3/7 - PEG tube exchanged by IR/was clotted.     ON: calm overnight, no aggressive behavior.    S: wakes up to voice, following commands     /72   Pulse 91   Temp 97.5 °F (36.4 °C) (Axillary)   Resp 16   Wt 105 kg (231 lb 0.7 oz)   SpO2 92%   BMI 28.14 kg/m²  Trach cuffed shiley #5 in place, normal respiratory efforts. RR, mild peripheral edema, nodding head to qs.    Labs: wbc 14.17>>13k, PCT 0.4  last checked yesterday    CXR 3/5: hypoventilatory changes, no new focal infiltrates     Principal Problem:    Chronic respiratory failure with hypoxia and hypercapnia (HCC)  Active Problems:    S/P percutaneous endoscopic gastrostomy (PEG) tube placement (HCC)    Status post tracheostomy (HCC)    Mixed axonal-demyelinating polyneuropathy    Ventilator dependent (HCC)    History of pulmonary embolus (PE)     Agitation    Mood disorder (HCC)    Palliative care patient    ? Suicidal risk          Impression:  Agitation - unclear etiology, possibly frustration/suicidal   Agitated on admission, reported by family, not improving with additional quetiapine 25 mg    At baseline used to be cooperative, does PTOT  Off precedex since 3/4  Multiple discussion with pt states that he is frustrated, communicates by nodding his head, intermittently able to write  Denies suicidal ideation  Neuromuscular dysfunction  Prior extensive work up was negative  S/p treatment with high dose steroids/IVIG  See neurology notes 10/18/2024  Unknown etiology at this point  Last follow-up as an outpatient with neuromuscular disorders specialist  Chronic respiratory failure-vent dependent 24/7  Review multiple chest x-rays/CTs with bilateral elevated diaphragms  Suspect as part of the neuromuscular dysfunction/diaphragmatic paralysis contributing to the respiratory failure  Failed SBT bradypnea/very low tidal volume 100 to 200 cc with hypoxia  PEG tube  Hx PE   Mood disorder  Leukocytosis-likely reactive      Plan:  Agitation: No ongoing signs of infection/or triggers, mainly frustrated denied suicidal thoughts/or attempts, had outbursts episodes/trying to pull his trach maintained on one-to-one.  Evaluated by psychiatry multiple times.  Continue quetiapine 50 mg bid/200 mg hs  Continue lexapro 10mg daily  PRN zyprexa 10 mg IM q6 for agitation, has not used it  Noted improvement with the current regimen, slept all night, awake and cooperative today off restraints  Approved for 20 hours nursing care at home  After extensive discussion with palliative/ethics  Patient/family insists on going home with providing care as above  Palliative care/ethics consult   Family still very adamant about taking him home  However unable to provide 24 nursing care, missed some OP follow up   Discussed with neurology team Dr. Dc   Needs to complete workup for  neuromuscular dysfunction, last test sent to my clinic for Pompeii disease that was negative  Peripheral nerve EMG was negative  Now with concern for diaphragmatic paralysis/paresis   Could be a candidate for diaphragmatic pacers  Reviewed multiple CT/CXR that showed bilateral elevated hemidiaphragms  Family requesting second opinion: Left a message to neuromuscular clinic/team at [facility], awaiting a callback to discuss new referral  Leukocytosis improving, likely reactive with a normal PCT  CXR no new infiltrates, PCT negative   Monitor off antibiotics for now  Continue mucous clearing therapy mucomyst/duoneb  Continue vent support at home settings  Riding the vent  Continue to try a brief SBT: apnea/very low tv 100-200cc  Continue critical care level, pending arrangements for discharge      Neuro:  ***.   Cardiovascular: ***  Pulmonary:  ***  Gastrointestinal:  ***  Renal:  ***  ID:  ***  Heme:  ***  Endocrine:  ***.  Musculoskeletal:  ***.      Disposition:   {CC Dispo Plan:46656}    Devices:      Invasive Devices       Central Venous Catheter Line  Duration             Port A Cath 03/14/23 Right Subclavian 724 days              Peripheral Intravenous Line  Duration             Peripheral IV 03/04/25 Distal;Left;Upper;Ventral (anterior) Arm 3 days    Peripheral IV 03/04/25 Dorsal (posterior);Right Wrist 3 days              Drain  Duration             Gastrostomy/Enterostomy Gastrostomy-Jejunostomy 16 Fr. LUQ 25 days    External Urinary Catheter <1 day              Airway  Duration             Surgical Airway Shiley Distal 127 days                       Yes      No      Comments              Feeding                 Analgesia                 Sedation                 Thrombosis prevention           x     Head of bed up >30degrees                 Ulcer prophylaxis           x     Glucose control           x     Oral care                 Code Status    Level 1 - Full Code                                Family updated  today              ***                    I have personally seen and examined the patient on 3/7/2025. I discussed the patient with the Advanced Practitioner (AMBER or PA-C) including, but not limited to, verifying findings; reviewing labs and x-rays/other imagings; discussing with consultants; developing the plan of care with the bedside nurse; and discussing treatment plan with patient or surrogate.  I have reviewed the note and assessment performed by the Advanced Practitioner (CRNP or PA-C) , and agree with the documented findings and plan of care with the following additions/exceptions. Please see my following comments for details and adjustments. Critical care time, excluding procedures, teaching, family meetings, and excludes any prior time recorded by providers other than myself, (***) minutes (*** to ***).      Jovan Holden MD

## 2025-03-05 ENCOUNTER — APPOINTMENT (INPATIENT)
Dept: RADIOLOGY | Facility: HOSPITAL | Age: 40
DRG: 760 | End: 2025-03-05
Payer: COMMERCIAL

## 2025-03-05 PROBLEM — R00.1 BRADYCARDIA: Status: RESOLVED | Noted: 2024-06-01 | Resolved: 2025-03-05

## 2025-03-05 LAB
ALBUMIN SERPL BCG-MCNC: 3.7 G/DL (ref 3.5–5)
ALP SERPL-CCNC: 84 U/L (ref 34–104)
ALT SERPL W P-5'-P-CCNC: 13 U/L (ref 7–52)
ANION GAP SERPL CALCULATED.3IONS-SCNC: 9 MMOL/L (ref 4–13)
AST SERPL W P-5'-P-CCNC: 13 U/L (ref 13–39)
BASOPHILS # BLD AUTO: 0.05 THOUSANDS/ÂΜL (ref 0–0.1)
BASOPHILS NFR BLD AUTO: 0 % (ref 0–1)
BILIRUB SERPL-MCNC: 0.57 MG/DL (ref 0.2–1)
BUN SERPL-MCNC: 13 MG/DL (ref 5–25)
CA-I BLD-SCNC: 1.17 MMOL/L (ref 1.12–1.32)
CALCIUM SERPL-MCNC: 9.1 MG/DL (ref 8.4–10.2)
CHLORIDE SERPL-SCNC: 94 MMOL/L (ref 96–108)
CO2 SERPL-SCNC: 32 MMOL/L (ref 21–32)
CREAT SERPL-MCNC: 0.48 MG/DL (ref 0.6–1.3)
EOSINOPHIL # BLD AUTO: 0 THOUSAND/ÂΜL (ref 0–0.61)
EOSINOPHIL NFR BLD AUTO: 0 % (ref 0–6)
ERYTHROCYTE [DISTWIDTH] IN BLOOD BY AUTOMATED COUNT: 16 % (ref 11.6–15.1)
GFR SERPL CREATININE-BSD FRML MDRD: 140 ML/MIN/1.73SQ M
GLUCOSE SERPL-MCNC: 80 MG/DL (ref 65–140)
HCT VFR BLD AUTO: 30.8 % (ref 36.5–49.3)
HGB BLD-MCNC: 9.7 G/DL (ref 12–17)
IMM GRANULOCYTES # BLD AUTO: 0.04 THOUSAND/UL (ref 0–0.2)
IMM GRANULOCYTES NFR BLD AUTO: 0 % (ref 0–2)
LYMPHOCYTES # BLD AUTO: 0.62 THOUSANDS/ÂΜL (ref 0.6–4.47)
LYMPHOCYTES NFR BLD AUTO: 4 % (ref 14–44)
MAGNESIUM SERPL-MCNC: 2 MG/DL (ref 1.9–2.7)
MCH RBC QN AUTO: 29.3 PG (ref 26.8–34.3)
MCHC RBC AUTO-ENTMCNC: 31.5 G/DL (ref 31.4–37.4)
MCV RBC AUTO: 93 FL (ref 82–98)
MONOCYTES # BLD AUTO: 0.61 THOUSAND/ÂΜL (ref 0.17–1.22)
MONOCYTES NFR BLD AUTO: 4 % (ref 4–12)
NEUTROPHILS # BLD AUTO: 12.85 THOUSANDS/ÂΜL (ref 1.85–7.62)
NEUTS SEG NFR BLD AUTO: 92 % (ref 43–75)
NRBC BLD AUTO-RTO: 0 /100 WBCS
PHOSPHATE SERPL-MCNC: 4.5 MG/DL (ref 2.7–4.5)
PLATELET # BLD AUTO: 212 THOUSANDS/UL (ref 149–390)
PMV BLD AUTO: 10 FL (ref 8.9–12.7)
POTASSIUM SERPL-SCNC: 3.7 MMOL/L (ref 3.5–5.3)
PROCALCITONIN SERPL-MCNC: 0.13 NG/ML
PROT SERPL-MCNC: 7.1 G/DL (ref 6.4–8.4)
RBC # BLD AUTO: 3.31 MILLION/UL (ref 3.88–5.62)
SODIUM SERPL-SCNC: 135 MMOL/L (ref 135–147)
WBC # BLD AUTO: 14.17 THOUSAND/UL (ref 4.31–10.16)

## 2025-03-05 PROCEDURE — 94760 N-INVAS EAR/PLS OXIMETRY 1: CPT

## 2025-03-05 PROCEDURE — 84100 ASSAY OF PHOSPHORUS: CPT

## 2025-03-05 PROCEDURE — 99233 SBSQ HOSP IP/OBS HIGH 50: CPT | Performed by: INTERNAL MEDICINE

## 2025-03-05 PROCEDURE — 85025 COMPLETE CBC W/AUTO DIFF WBC: CPT

## 2025-03-05 PROCEDURE — 94664 DEMO&/EVAL PT USE INHALER: CPT

## 2025-03-05 PROCEDURE — 94668 MNPJ CHEST WALL SBSQ: CPT

## 2025-03-05 PROCEDURE — 84145 PROCALCITONIN (PCT): CPT

## 2025-03-05 PROCEDURE — 94669 MECHANICAL CHEST WALL OSCILL: CPT

## 2025-03-05 PROCEDURE — 80053 COMPREHEN METABOLIC PANEL: CPT

## 2025-03-05 PROCEDURE — 94150 VITAL CAPACITY TEST: CPT

## 2025-03-05 PROCEDURE — 94640 AIRWAY INHALATION TREATMENT: CPT

## 2025-03-05 PROCEDURE — 83735 ASSAY OF MAGNESIUM: CPT

## 2025-03-05 PROCEDURE — 71045 X-RAY EXAM CHEST 1 VIEW: CPT

## 2025-03-05 PROCEDURE — 94003 VENT MGMT INPAT SUBQ DAY: CPT

## 2025-03-05 PROCEDURE — 82330 ASSAY OF CALCIUM: CPT

## 2025-03-05 RX ORDER — LORAZEPAM 1 MG/1
1 TABLET ORAL EVERY 6 HOURS PRN
Status: DISCONTINUED | OUTPATIENT
Start: 2025-03-05 | End: 2025-03-05

## 2025-03-05 RX ORDER — ACETAMINOPHEN 160 MG/5ML
650 SUSPENSION ORAL EVERY 4 HOURS PRN
Status: DISCONTINUED | OUTPATIENT
Start: 2025-03-05 | End: 2025-03-06

## 2025-03-05 RX ORDER — ACETYLCYSTEINE 200 MG/ML
3 SOLUTION ORAL; RESPIRATORY (INHALATION)
Status: DISCONTINUED | OUTPATIENT
Start: 2025-03-05 | End: 2025-03-08 | Stop reason: HOSPADM

## 2025-03-05 RX ORDER — LORAZEPAM 2 MG/ML
INJECTION INTRAMUSCULAR
Status: COMPLETED
Start: 2025-03-05 | End: 2025-03-07

## 2025-03-05 RX ORDER — LORAZEPAM 1 MG/1
1 TABLET ORAL ONCE
Status: COMPLETED | OUTPATIENT
Start: 2025-03-05 | End: 2025-03-05

## 2025-03-05 RX ORDER — LORAZEPAM 1 MG/1
1 TABLET ORAL EVERY 6 HOURS PRN
Status: DISCONTINUED | OUTPATIENT
Start: 2025-03-05 | End: 2025-03-06

## 2025-03-05 RX ORDER — LORAZEPAM 2 MG/ML
INJECTION INTRAMUSCULAR
Status: COMPLETED
Start: 2025-03-05 | End: 2025-03-05

## 2025-03-05 RX ORDER — QUETIAPINE FUMARATE 100 MG/1
200 TABLET, FILM COATED ORAL
Status: DISCONTINUED | OUTPATIENT
Start: 2025-03-05 | End: 2025-03-08 | Stop reason: HOSPADM

## 2025-03-05 RX ADMIN — IPRATROPIUM BROMIDE AND ALBUTEROL SULFATE 3 ML: .5; 3 SOLUTION RESPIRATORY (INHALATION) at 13:52

## 2025-03-05 RX ADMIN — IPRATROPIUM BROMIDE AND ALBUTEROL SULFATE 3 ML: .5; 3 SOLUTION RESPIRATORY (INHALATION) at 20:12

## 2025-03-05 RX ADMIN — APIXABAN 5 MG: 5 TABLET, FILM COATED ORAL at 17:10

## 2025-03-05 RX ADMIN — QUETIAPINE FUMARATE 200 MG: 100 TABLET ORAL at 23:28

## 2025-03-05 RX ADMIN — IPRATROPIUM BROMIDE AND ALBUTEROL SULFATE 3 ML: .5; 3 SOLUTION RESPIRATORY (INHALATION) at 07:23

## 2025-03-05 RX ADMIN — ACETYLCYSTEINE 600 MG: 200 SOLUTION ORAL; RESPIRATORY (INHALATION) at 20:11

## 2025-03-05 RX ADMIN — ACETYLCYSTEINE 600 MG: 200 SOLUTION ORAL; RESPIRATORY (INHALATION) at 13:52

## 2025-03-05 RX ADMIN — CHLORHEXIDINE GLUCONATE 0.12% ORAL RINSE 15 ML: 1.2 LIQUID ORAL at 08:27

## 2025-03-05 RX ADMIN — ACETYLCYSTEINE 600 MG: 200 SOLUTION ORAL; RESPIRATORY (INHALATION) at 00:53

## 2025-03-05 RX ADMIN — QUETIAPINE FUMARATE 50 MG: 25 TABLET ORAL at 08:27

## 2025-03-05 RX ADMIN — LORAZEPAM 2 MG: 2 INJECTION INTRAMUSCULAR; INTRAVENOUS at 01:38

## 2025-03-05 RX ADMIN — LORAZEPAM 1 MG: 1 TABLET ORAL at 05:51

## 2025-03-05 RX ADMIN — APIXABAN 5 MG: 5 TABLET, FILM COATED ORAL at 08:28

## 2025-03-05 RX ADMIN — QUETIAPINE FUMARATE 50 MG: 25 TABLET ORAL at 17:10

## 2025-03-05 RX ADMIN — IPRATROPIUM BROMIDE AND ALBUTEROL SULFATE 3 ML: .5; 3 SOLUTION RESPIRATORY (INHALATION) at 00:53

## 2025-03-05 RX ADMIN — ACETAMINOPHEN 650 MG: 650 SUSPENSION ORAL at 17:10

## 2025-03-05 RX ADMIN — ACETYLCYSTEINE 600 MG: 200 SOLUTION ORAL; RESPIRATORY (INHALATION) at 07:23

## 2025-03-05 RX ADMIN — LORAZEPAM 1 MG: 2 INJECTION INTRAMUSCULAR; INTRAVENOUS at 22:15

## 2025-03-05 NOTE — ASSESSMENT & PLAN NOTE
Patient and family wish to stay in the home  Family very capable of caring for patient.  However, in the setting of recurrent episodes of agitation, pulling out trach/PEG - it appears to be more appropriate for patient to stay in long-term care facility- Discussed with sister/ Caregiver Dmitry- she verbalized patient mentioned before that he would die instead of staying in the facility and she clearly refused potential LTAC placement.

## 2025-03-05 NOTE — PROGRESS NOTES
Progress Note - Critical Care/ICU   Name: Hemanth Swanson 39 y.o. male I MRN: [id number]  Unit/Bed#: ICU 03 I Date of Admission: 3/4/2025   Date of Service: 3/5/2025 I Hospital Day: 1      Assessment & Plan  Chronic respiratory failure with hypoxia and hypercapnia (HCC)  S/p trach/ vent dependent for neuromuscular disease  Of note- Chronic settings of 18/500/8/40%  In the ED- VB.41-53.7-50.3-33.4  5.0 XLT Shiley trach.  However, of note 6.0  Known history of mucous plugging & intentional holding breath    Plan:  Continue full vent support  Pulmonary toilet; continue home duo-nebs and 3% nebs q6h and chest PT TID   Maintain O2 sat 90% or above   VAP preventions; chlorhexidine, PPI, HOB greater than 30 degrees  Agitation  Of note, this is a recurrent issue. Patient frequently tried to pull out trach/PEG during combative/agitated episodes.  There was initial concern regarding SI versus suicidal attempt per last admission notes patient was cleared by psych.  Patient received total 9 mg of Versed and was started on Precedex drip for his agitation.  Upon arrival [place] ICU-patient was easily agitated/ uncooperative.    Plan:  Appreciate psych recs  Recommended to Increase Seroquel to 50 mg twice daily and 200 mg at bedtime.  Add lorazepam 1 mg every 6 hours as needed for agitation  Currently on Seroquel 50mg BID with 150mg QHS- can titrate.  Restraint as needed.  One-to-one  QTc within normal.  Monitor on Tele  Routinely check EKG  Mood disorder (HCC)  Seen above.  Ventilator dependent (HCC)  Patient and family wish to stay in the home  Family very capable of caring for patient.  However, in the setting of recurrent episodes of agitation, pulling out trach/PEG - it appears to be more appropriate for patient to stay in long-term care facility- Discussed with sister/ Caregiver Dmitry- she verbalized patient mentioned before that he would die instead of staying in the facility and she clearly refused potential LTAC  placement.  History of pulmonary embolus (PE)  Continue home Eliquis  Mixed axonal-demyelinating polyneuropathy  Follows with neurology  Supportive care  Status post tracheostomy (HCC)  Noted history. Vent dependent  S/P percutaneous endoscopic gastrostomy (PEG) tube placement (HCC)  Recently replaced 2/13 after self removal  Continue tube feedings at goal   Bradycardia (Resolved: 3/5/2025)  New onset- likely 2/2 to precedex gtt.  D/c precedex  Monitor HR  Disposition: Critical care    ICU Core Measures     Vented Patient  VAP Bundle  VAP bundle ordered     A: Assess, Prevent, and Manage Pain Has pain been assessed? Yes  Need for changes to pain regimen? No   B: Both Spontaneous Awakening Trials (SATs) and Spontaneous Breathing Trials (SBTs) Plan to perform spontaneous awakening trial today? Chronic vent   Plan to perform spontaneous breathing trial today? Chronic vent   Obvious barriers to extubation? NA   C: Choice of Sedation RASS Goal: 0 Alert and Calm  Need for changes to sedation or analgesia regimen? NA   D: Delirium CAM-ICU: Unable to perform secondary to Acute cognitive dysfunction   E: Early Mobility  Plan for early mobility? Yes   F: Family Engagement Plan for family engagement today? Yes       Review of Invasive Devices:      Central access plan:  chronic port      Prophylaxis:  VTE VTE covered by:  apixaban, Per PEG Tube, 5 mg at 03/04/25 1708       Stress Ulcer  not ordered         24 Hour Events :     Combative/ aggressive behavior to nursing.  Psych consult    requiring locked restraints.    Subjective   Review of Systems: Review of Systems not obtainable due to Uncooperative patient    Objective :                   Vitals I/O      Most Recent Min/Max in 24hrs   Temp (!) 97.2 °F (36.2 °C) Temp  Min: 97 °F (36.1 °C)  Max: 99.1 °F (37.3 °C)   Pulse 95 Pulse  Min: 41  Max: 104   Resp (!) 23 Resp  Min: 16  Max: 24   /65 BP  Min: 82/52  Max: 151/95   O2 Sat 93 % SpO2  Min: 90 %  Max: 100 %       Intake/Output Summary (Last 24 hours) at 3/5/2025 0651  Last data filed at 3/5/2025 0400  Gross per 24 hour   Intake 1120 ml   Output 300 ml   Net 820 ml       Diet Enteral/Parenteral; Tube Feeding No Oral Diet; Jevity 1.5; Continuous; 65; 100; Water; Every 4 hours    Invasive Monitoring           Physical Exam   Physical Exam  Vitals and nursing note reviewed.   Eyes:      General:         Right eye: No discharge.         Left eye: No discharge.      Comments: Eyes open   Skin:     General: Skin is warm.   HENT:      Mouth/Throat:      Mouth: Mucous membranes are moist.   Neck:      Trachea: Tracheostomy present.   Cardiovascular:      Rate and Rhythm: Normal rate and regular rhythm.      Pulses: Normal pulses.   Musculoskeletal:      Right lower leg: No edema.      Left lower leg: No edema.      Comments: B/L LE- soft restraints  LUE locked restraint   Abdominal: General: Bowel sounds are normal.      Palpations: Abdomen is soft.   Constitutional:       General: He is not in acute distress.  Pulmonary:      Breath sounds: Rales (bibasilar) present.      Comments: On vent  Neurological:      Mental Status: He is alert. He is calm.      Comments: Uncooperative    Genitourinary/Anorectal:  external catheter present.        Diagnostic Studies        Lab Results: I have reviewed the following results:     Medications:  Scheduled PRN   acetylcysteine, 3 mL, Q6H  apixaban, 5 mg, BID  chlorhexidine, 15 mL, Q12H ISAEL  ipratropium-albuterol, 3 mL, Q6H  QUEtiapine, 150 mg, HS  QUEtiapine, 50 mg, BID      polyethylene glycol, 17 g, Daily PRN       Continuous          Labs:   CBC    Recent Labs     03/04/25  0039 03/05/25  0508   WBC 7.21 14.17*   HGB 10.2* 9.7*   HCT 33.1* 30.8*    212     BMP    Recent Labs     03/04/25  0039 03/05/25  0508   SODIUM 136 135   K 3.7 3.7   CL 94* 94*   CO2 34* 32   AGAP 8 9   BUN 16 13   CREATININE 0.46* 0.48*   CALCIUM 9.3 9.1       Coags    Recent Labs     03/04/25  0136   INR 1.08    PTT 29        Additional Electrolytes  Recent Labs     03/05/25  0508   MG 2.0   PHOS 4.5   CAIONIZED 1.17          Blood Gas    No recent results  Recent Labs     03/04/25  0355   PHVEN 7.412*   [id number] 53.7*   PO2VEN 50.3*   QCA6MZE 33.4*   BEVEN 7.6   [id number] 83.6*    LFTs  Recent Labs     03/04/25  0039 03/05/25  0508   ALT 13 13   AST 13 13   ALKPHOS 91 84   ALB 3.8 3.7   TBILI 0.41 0.57       Infectious  Recent Labs     03/04/25  0039   PROCALCITONI 0.07     Glucose  Recent Labs     03/04/25  0039 03/05/25  0508   GLUC 105 80

## 2025-03-05 NOTE — UTILIZATION REVIEW
Initial Clinical Review    Admission: Date/Time/Statement:   Admission Orders (From admission, onward)       Ordered        03/04/25 0759  Inpatient Admission  Once                          Orders Placed This Encounter   Procedures    Inpatient Admission     Standing Status:   Standing     Number of Occurrences:   1     Level of Care:   Critical Care [15]     Estimated length of stay:   More than 2 Midnights     Certification:   I certify that inpatient services are medically necessary for this patient for a duration of greater than two midnights. See H&P and MD Progress Notes for additional information about the patient's course of treatment.     ED Arrival Information       Patient not seen in ED                       Initial Presentation: 39 y.o. male PMH mixed axonal demyelinating polyneuropathy, trach/vent dependent, PEG tubes in place,   HX PE on Eliquis, non-verbal brought by family to referring ED for acute agitation. Of note, given higher dose of Seroquel 250 mg (normally 200mg)  became combative and trying to kick/strike his family, trying to pull out his trach and PEG w concern for suicidal ideation vs suicide attempt. Initial concern for suicidal ideation versus suicidal attempt.  Given Versed 4 mg per EMS and additional 5mg in the ED but was still very uncooperative/combative.   Upon chart review, he does have a history of reported SI with attempting to pull his trach previously   @ referring ED, CBC, CMP unremarkable.  Pro-Scott neg.  UA negative.  Lactic acid negative.  VBG 7.39-50 0.4-153-30.4.  EKG nonacute.  CXR per my reading-stable bibasilar atelectasis.  No acute cardiopulmonary process.  Cont combative & started on low-dose Precedex transfer to [facility] due to bed capacity.   Exam bradycardia, eyes open & easily agitated/ uncooperative. rales on vent     PLAN  Inpatient ICU admission due to chronic respiratory failure w hypoxia & hypercapnia in a vent dependent person, agitation.  Cont full  "vent support w pulmonary toilet, cont home duo nebs, 3% nebs Q 6 HR & chest PT TID, maintain POX 90% or >, VAP precautions  Continue home Seroquel 200mg QHS  Psychiatry consult, Restraint PRN,   One-to-one OBS, As needed Zyprexa/ Ativan - QTc within normal. Tele, neuro checks Q 8 HR,Routinely check EKG. Monitor off antibx, cont home meds TF. DC IV Precedex GTT & monitor HR. TF  Family refusing LTAC placement/ previously patient desires not to be placed  Behavioral Health Tele medicine Consult  TREATMENT PLAN RECOMMENDATIONS:  Medications: Increase Seroquel to 50 mg twice daily and 200 mg at bedtime.  Add lorazepam 1 mg every 6 hours as needed for agitation  Disposition: Was unable to complete interview with patient at this time due to lack of cooperation , please re-consult when patient is able to be interviewed.  Date: 3/5/2025   Day 2: ICU   agitated, restraints added, iv ativan 1 mg given    Psych consulted, started quetiapine 50 bid, 200 hs  Required IV ativan overnight due to agitation  Switch ativan to via PEG tube instead of IV, as PRN   DC when agitation/distress is controlled via meds/PEG tube not IV  Palliative care consult   Leukocytosis noted today, check CXR, add PCT  Monitor off antibiotics for now  Continue mucous clearing therapy mucomyst/duoneb  Continue vent support at home settings  Continue critical care level  Date: 3/6/2025  Day 3: Has surpassed a 2nd midnight with active treatments and services.  ICU level of care  Yesterday afternoon, patient's mood/behavior initially improved-which changed his left arm to soft restraint.   Another episode of \"trying to pull out trach \"with family presented. ?SI/ SA  Requiring Ativan overnight for agitation/aggressive/combative behaviors.  Left arm locked restraint reapplied.     Palliative care consult. Family & patient desire care in home but in the setting of recurrent episodes of agitation, pulling out trach/PEG - it appears to be more appropriate for " patient to stay in long-term care facility- Discussed with sister/ Caregiver Dmitry- she verbalized patient mentioned before that he would die instead of staying in the facility and she clearly refused potential LTAC placement.   Considering adding anti-depressant- will discuss with psych   Cont full vent support    ED Treatment-Medication Administration - No Administrations Displayed (No Start Event Found)       None            Scheduled Medications:  acetylcysteine, 3 mL, Nebulization, Q6H  apixaban, 5 mg, Per PEG Tube, BID  chlorhexidine, 15 mL, Mouth/Throat, Q12H ISAEL  ipratropium-albuterol, 3 mL, Nebulization, Q6H  LORazepam, 1 mg, Intravenous, Once  QUEtiapine, 200 mg, Per G Tube, HS  QUEtiapine, 50 mg, Per PEG Tube, BID      Continuous IV Infusions:  dexmedeTOMIDine (Precedex) 400 mcg in sodium chloride 0.9% 100 mL  Rate: 2.9-20 mL/hr Dose: 0.1-0.7 mcg/kg/hr  Weight Dosing Info: 114 kg  Freq: Titrated Route: IV  Last Dose: 0.2 mcg/kg/hr (03/04/25 0315)  Start: 03/04/25 0130 End: 03/04/25 0745     PRN Meds:  acetaminophen, 650 mg, Oral, Q4H PRN  LORazepam, 1 mg, Per PEG Tube, Q6H PRN  polyethylene glycol, 17 g, Oral, Daily PRN      ED Triage Vitals   Temperature Pulse Respirations Blood Pressure SpO2 Pain Score   03/04/25 1601 03/04/25 0800 03/04/25 0800 03/04/25 0800 03/04/25 0750 03/05/25 1710   99.1 °F (37.3 °C) (!) 41 18 151/95 99 % Med Not Given for Pain - for MAR use only     Weight (last 2 days)       Date/Time Weight    03/05/25 0600 107 (236.55)    03/04/25 0959 107 (236.55)    03/04/25 0800 107 (236.55)            Vital Signs (last 3 days)       Date/Time Temp Pulse Resp BP MAP (mmHg) SpO2 FiO2 (%) O2 Device Patient Position - Orthostatic VS [place] Coma Scale Score Pain    03/06/25 0800 99.9 °F (37.7 °C) 91 16 104/62 77 91 % -- -- Lying 11 2    03/06/25 0726 -- -- -- -- -- -- 35 Ventilator -- -- --    03/06/25 0700 -- 86 22 103/59 76 92 % -- Ventilator -- -- No Pain    03/06/25 0600 -- 92 25 114/69 87  91 % -- -- -- -- --    03/06/25 0500 -- 95 16 94/50 67 86 % -- -- -- -- --    03/06/25 0400 99.6 °F (37.6 °C) 97 16 98/57 72 88 % -- Ventilator Lying 9 --    03/06/25 0314 -- -- -- -- -- 90 % -- -- -- -- --    03/06/25 0300 -- 90 16 96/58 72 90 % -- -- -- -- --    03/06/25 0200 -- 94 16 93/51 66 92 % -- -- -- -- --    03/06/25 0100 -- 98 16 92/50 64 91 % -- -- -- -- --    03/06/25 0040 -- -- -- -- -- -- -- -- -- -- Med Not Given for Pain - for MAR use only    03/06/25 0008 100.2 °F (37.9 °C) 112 16 98/61 75 90 % -- Ventilator Lying 9 --    03/06/25 0000 -- 111 10 98/61 75 88 % -- -- -- -- --    03/05/25 2329 -- -- -- -- -- 91 % -- -- -- -- --    03/05/25 2300 -- 104 16 96/51 68 90 % -- -- -- -- --    03/05/25 2200 -- 134 47 106/59 73 94 % -- -- -- -- --    03/05/25 2100 -- 115 16 120/68 87 93 % -- -- -- -- --    03/05/25 2012 -- -- -- -- -- 93 % 35 Ventilator -- -- --    03/05/25 2000 98.1 °F (36.7 °C) 91 16 112/62 81 93 % -- Ventilator Lying -- --    03/05/25 1931 98.8 °F (37.1 °C) -- -- -- -- -- -- -- -- -- --    03/05/25 1920 98.1 °F (36.7 °C) -- -- -- -- -- -- -- -- 9 --    03/05/25 1900 -- 98 16 100/56 72 93 % -- -- -- -- --    03/05/25 1803 -- 116 16 110/60 79 94 % -- -- -- -- --    03/05/25 1800 -- 118 18 110/60 79 94 % -- -- -- -- --    03/05/25 1710 -- 101 16 102/62 76 92 % -- -- -- -- Med Not Given for Pain - for MAR use only    03/05/25 1700 -- 106 16 102/62 76 92 % -- -- -- -- --    03/05/25 1649 -- -- -- -- -- 91 % -- -- -- -- --    03/05/25 1628 101.6 °F (38.7 °C) 111 16 -- -- 90 % -- -- -- 9 --    03/05/25 1600 -- 117 16 105/56 75 91 % 35 Ventilator Lying -- --    03/05/25 1500 -- 122 18 107/67 83 97 % -- -- -- -- --    03/05/25 1400 -- 92 16 100/59 75 96 % -- -- -- -- --    03/05/25 1352 -- -- -- -- -- 96 % -- -- -- -- --    03/05/25 1300 -- 87 16 105/65 80 96 % -- -- -- -- --    03/05/25 1200 -- 94 16 101/60 75 96 % 35 Ventilator Lying 9 --    03/05/25 1128 100 °F (37.8 °C) -- -- -- -- -- -- -- --  -- --    03/05/25 0900 -- 100 16 98/61 74 95 % -- -- -- -- --    03/05/25 0800 -- 96 24 100/58 73 93 % -- -- -- -- --    03/05/25 0723 -- -- -- -- -- 94 % -- -- -- -- --    03/05/25 0709 100.1 °F (37.8 °C) -- -- -- -- -- -- -- -- -- --    03/05/25 0706 -- -- -- -- -- -- -- -- -- 9 --    03/05/25 0703 -- 85 17 117/74 91 96 % 35 Ventilator Lying -- --    03/05/25 0700 -- 84 23 84/54 64 77 % -- -- -- -- --    03/05/25 0600 -- 101 16 92/57 69 91 % -- -- -- -- --    03/05/25 0500 -- 95 23 104/65 80 93 % -- -- -- -- --    03/05/25 0400 97.2 °F (36.2 °C) 93 27 88/61 70 94 % -- Ventilator Lying 9 --    03/05/25 0331 -- -- -- -- -- 90 % -- -- -- -- --    03/05/25 0300 -- 94 17 91/54 68 90 % -- -- -- -- --    03/05/25 0213 -- 100 16 90/50 64 92 % -- -- -- -- --    03/05/25 0136 -- 104 16 101/57 73 92 % -- -- -- -- --    03/05/25 0053 -- -- -- -- -- 94 % -- -- -- -- --    03/05/25 0000 97 °F (36.1 °C) 85 16 90/54 66 93 % -- Ventilator Lying 9 --    03/04/25 2302 -- 99 20 94/57 68 95 % -- -- -- -- --    03/04/25 2213 -- -- -- 101/61 75 -- -- -- -- -- --    03/04/25 2113 -- -- -- -- -- 96 % -- -- -- -- --    03/04/25 2100 -- 85 16 91/51 66 96 % -- -- -- -- --    03/04/25 2000 97.7 °F (36.5 °C) 71 21 97/52 70 97 % -- Ventilator Lying 9 --    03/04/25 1900 -- 77 16 92/53 67 97 % -- -- -- -- --    03/04/25 1830 -- 66 16 107/68 83 98 % -- -- -- -- --    03/04/25 1800 -- 72 16 82/52 62 96 % -- -- -- -- --    03/04/25 1730 -- 77 16 82/50 62 97 % -- -- -- -- --    03/04/25 1700 -- 81 16 84/53 61 97 % -- -- -- -- --    03/04/25 1630 -- 71 17 107/70 83 97 % -- -- -- -- --    03/04/25 1601 99.1 °F (37.3 °C) 52 16 108/67 83 100 % 35 Ventilator Lying 9 --    03/04/25 1555 -- -- -- -- -- 97 % -- -- -- -- --    03/04/25 1530 -- 72 18 83/50 61 96 % -- -- -- -- --    03/04/25 1400 -- 59 19 90/55 67 99 % -- -- -- -- --    03/04/25 1337 -- 68 24 111/61 73 93 % -- -- -- -- --    03/04/25 1300 -- 68 18 96/71 80 99 % -- -- -- -- --    03/04/25 1257  -- -- -- -- -- -- -- -- -- 9 --    03/04/25 1100 -- -- -- -- -- 98 % 35 Ventilator -- -- --    03/04/25 0833 -- -- -- -- -- 98 % -- -- -- -- --    03/04/25 0800 -- 41 18 151/95 119 100 % 35 Ventilator -- -- --    03/04/25 0750 -- -- -- -- -- 99 % -- -- -- 9 --              Pertinent Labs/Diagnostic Test Results:   Radiology:  XR chest portable ICU   Final Interpretation by Oswaldo Elizabeth MD (03/05 1504)      No convincing interval change in the layering pleural effusions and bibasilar consolidations, which may represent atelectasis or pneumonia.            Workstation performed: [id number]           Cardiology:  ECG 12 lead   Final Result by Andrea Negrete MD (03/04 1205)   Marked sinus bradycardia with 1st degree A-V block   Rightward axis   Abnormal ECG   When compared with ECG of 04-Mar-2025 00:08, (unconfirmed)   ND interval has increased   Vent. rate has decreased by  42 bpm   Confirmed by Andrea Negrete ([id number]) on 3/4/2025 12:05:32 PM        GI:  No orders to display           Results from last 7 days   Lab Units 03/05/25  0508 03/04/25  0039   WBC Thousand/uL 14.17* 7.21   HEMOGLOBIN g/dL 9.7* 10.2*   HEMATOCRIT % 30.8* 33.1*   PLATELETS Thousands/uL 212 248   TOTAL NEUT ABS Thousands/µL 12.85* 5.42         Results from last 7 days   Lab Units 03/05/25  0508 03/04/25  0039   SODIUM mmol/L 135 136   POTASSIUM mmol/L 3.7 3.7   CHLORIDE mmol/L 94* 94*   CO2 mmol/L 32 34*   ANION GAP mmol/L 9 8   BUN mg/dL 13 16   CREATININE mg/dL 0.48* 0.46*   EGFR ml/min/1.73sq m 140 143   CALCIUM mg/dL 9.1 9.3   CALCIUM, IONIZED mmol/L 1.17  --    MAGNESIUM mg/dL 2.0  --    PHOSPHORUS mg/dL 4.5  --      Results from last 7 days   Lab Units 03/05/25  0508 03/04/25  0039   AST U/L 13 13   ALT U/L 13 13   ALK PHOS U/L 84 91   TOTAL PROTEIN g/dL 7.1 7.7   ALBUMIN g/dL 3.7 3.8   TOTAL BILIRUBIN mg/dL 0.57 0.41         Results from last 7 days   Lab Units 03/05/25  0508 03/04/25  0039   GLUCOSE RANDOM mg/dL 80 105  "            No results found for: \"BETA-HYDROXYBUTYRATE\"       Results from last 7 days   Lab Units 03/04/25  0355 03/04/25  0039   PH SYLVESTER  7.412* 7.398   PCO2 SYLVESTER mm Hg 53.7* 50.4*   PO2 SYLVESTER mm Hg 50.3* 153.4*   HCO3 SYLVESTER mmol/L 33.4* 30.4*   BASE EXC SYLVESTER mmol/L 7.6 4.7   O2 CONTENT SYLVESTER ml/dL 12.7 15.2   O2 HGB, VENOUS % 83.6* 97.4*                     Results from last 7 days   Lab Units 03/04/25  0136   PROTIME seconds 14.8   INR  1.08   PTT seconds 29         Results from last 7 days   Lab Units 03/05/25  0508 03/04/25  0039   PROCALCITONIN ng/ml 0.13 0.07     Results from last 7 days   Lab Units 03/04/25  0136   LACTIC ACID mmol/L 1.1           Results from last 7 days   Lab Units 03/04/25  0325   CLARITY UA  Clear   COLOR UA  Light Yellow   SPEC GRAV UA  1.013   PH UA  6.5   GLUCOSE UA mg/dl Negative   KETONES UA mg/dl Negative   BLOOD UA  Negative   PROTEIN UA mg/dl Negative   NITRITE UA  Negative   BILIRUBIN UA  Negative   UROBILINOGEN UA (BE) mg/dl <2.0   LEUKOCYTES UA  Negative               Results from last 7 days   Lab Units 03/04/25  0136 03/04/25  0045   BLOOD CULTURE  No Growth at 48 hrs. No Growth at 48 hrs.                   Past Medical History:   Diagnosis Date    Anxiety     Bradycardia 06/01/2024    Cancer (HCC)     Depression     Hypernatremia 10/06/2024    Migration of percutaneous endoscopic gastrostomy (PEG) tube ([facility]) 09/24/2023    Psychiatric disorder     bipolar    Sepsis ([facility]) 08/24/2023     Present on Admission:   Agitation   History of pulmonary embolus (PE)   Chronic respiratory failure with hypoxia and hypercapnia (HCC)   Mixed axonal-demyelinating polyneuropathy   (Resolved) Bradycardia   Mood disorder (HCC)      Admitting Diagnosis: acute agitation  Age/Sex: 39 y.o. male    Network Utilization Review Department  ATTENTION: Please call with any questions or concerns to [phone] and carefully listen to the prompts so that you are directed to the right person. All voicemails are " confidential.   For Discharge needs, contact Care Management DC Support Team at [phone] opt. [phone]  Send all requests for admission clinical reviews, approved or denied determinations and any other requests to dedicated fax number below belonging to the campus where the patient is receiving treatment. List of dedicated fax numbers for the Facilities:  FACILITY NAME UR FAX NUMBER   ADMISSION DENIALS (Administrative/Medical Necessity) [phone]   DISCHARGE SUPPORT TEAM (NETWORK) [phone]   PARENT CHILD HEALTH (Maternity/NICU/Pediatrics) [phone]   [facility] [phone]   [facility] [phone]   [facility] [phone]   [facility] [phone]   [facility] [phone]   [facility] [phone]   [facility] [phone]   [facility] [phone]   [facility] [phone]   [facility] [phone]   [facility] [phone]   [facility] [phone]

## 2025-03-05 NOTE — PLAN OF CARE
Problem: PAIN - ADULT  Goal: Verbalizes/displays adequate comfort level or baseline comfort level  Description: Interventions:  - Encourage patient to monitor pain and request assistance  - Assess pain using appropriate pain scale  - Administer analgesics based on type and severity of pain and evaluate response  - Implement non-pharmacological measures as appropriate and evaluate response  - Consider cultural and social influences on pain and pain management  - Notify physician/advanced practitioner if interventions unsuccessful or patient reports new pain  Outcome: Progressing     Problem: SAFETY ADULT  Goal: Patient will remain free of falls  Description: INTERVENTIONS:  - Educate patient/family on patient safety including physical limitations  - Instruct patient to call for assistance with activity   - Consult OT/PT to assist with strengthening/mobility   - Keep Call bell within reach  - Keep bed low and locked with side rails adjusted as appropriate  - Keep care items and personal belongings within reach  - Initiate and maintain comfort rounds  - Make Fall Risk Sign visible to staff  - Offer Toileting every 2 Hours, in advance of need  - Initiate/Maintain bed alarm  - Obtain necessary fall risk management equipment:   - Apply yellow socks and bracelet for high fall risk patients  - Consider moving patient to room near nurses station  Outcome: Progressing  Goal: Maintain or return to baseline ADL function  Description: INTERVENTIONS:  -  Assess patient's ability to carry out ADLs; assess patient's baseline for ADL function and identify physical deficits which impact ability to perform ADLs (bathing, care of mouth/teeth, toileting, grooming, dressing, etc.)  - Assess/evaluate cause of self-care deficits   - Assess range of motion  - Assess patient's mobility; develop plan if impaired  - Assess patient's need for assistive devices and provide as appropriate  - Encourage maximum independence but intervene and  supervise when necessary  - Involve family in performance of ADLs  - Assess for home care needs following discharge   - Consider OT consult to assist with ADL evaluation and planning for discharge  - Provide patient education as appropriate  Outcome: Progressing  Goal: Maintains/Returns to pre admission functional level  Description: INTERVENTIONS:  - Perform AM-PAC 6 Click Basic Mobility/ Daily Activity assessment daily.  - Set and communicate daily mobility goal to care team and patient/family/caregiver.   - Collaborate with rehabilitation services on mobility goals if consulted  Problem: DISCHARGE PLANNING  Goal: Discharge to home or other facility with appropriate resources  Description: INTERVENTIONS:  - Identify barriers to discharge w/patient and caregiver  - Arrange for needed discharge resources and transportation as appropriate  - Identify discharge learning needs (meds, wound care, etc.)  - Arrange for interpretive services to assist at discharge as needed  - Refer to Case Management Department for coordinating discharge planning if the patient needs post-hospital services based on physician/advanced practitioner order or complex needs related to functional status, cognitive ability, or social support system  Outcome: Progressing     Problem: Knowledge Deficit  Goal: Patient/family/caregiver demonstrates understanding of disease process, treatment plan, medications, and discharge instructions  Description: Complete learning assessment and assess knowledge base.  Interventions:  - Provide teaching at level of understanding  - Provide teaching via preferred learning methods  Outcome: Progressing     Problem: SAFETY,RESTRAINT: NV/NON-SELF DESTRUCTIVE BEHAVIOR  Goal: Remains free of harm/injury (restraint for non violent/non self-detsructive behavior)  Description: INTERVENTIONS:  - Instruct patient/family regarding restraint use   - Assess and monitor physiologic and psychological status   - Provide  interventions and comfort measures to meet assessed patient needs   - Identify and implement measures to help patient regain control  - Assess readiness for release of restraint   Outcome: Progressing  Goal: Returns to optimal restraint-free functioning  Description: INTERVENTIONS:  - Assess the patient's behavior and symptoms that indicate continued need for restraint  - Identify and implement measures to help patient regain control  - Assess readiness for release of restraint   Outcome: Progressing     Problem: NEUROSENSORY - ADULT  Goal: Achieves stable or improved neurological status  Description: INTERVENTIONS  - Monitor and report changes in neurological status  - Monitor vital signs such as temperature, blood pressure, glucose, and any other labs ordered   - Initiate measures to prevent increased intracranial pressure  - Monitor for seizure activity and implement precautions if appropriate      Outcome: Progressing     Problem: CARDIOVASCULAR - ADULT  Goal: Absence of cardiac dysrhythmias or at baseline rhythm  Description: INTERVENTIONS:  - Continuous cardiac monitoring, vital signs, obtain 12 lead EKG if ordered  - Administer antiarrhythmic and heart rate control medications as ordered  - Monitor electrolytes and administer replacement therapy as ordered  Outcome: Progressing     Problem: RESPIRATORY - ADULT  Goal: Achieves optimal ventilation and oxygenation  Description: INTERVENTIONS:  - Assess for changes in respiratory status  - Assess for changes in mentation and behavior  - Position to facilitate oxygenation and minimize respiratory effort  - Oxygen administered by appropriate delivery if ordered  - Initiate smoking cessation education as indicated  - Encourage broncho-pulmonary hygiene including cough, deep breathe, Incentive Spirometry  - Assess the need for suctioning and aspirate as needed  - Assess and instruct to report SOB or any respiratory difficulty  - Respiratory Therapy support as  indicated  Outcome: Progressing     Problem: GENITOURINARY - ADULT  Goal: Absence of urinary retention  Description: INTERVENTIONS:  - Assess patient’s ability to void and empty bladder  - Monitor I/O  - Bladder scan as needed  - Discuss with physician/AP medications to alleviate retention as needed  - Discuss catheterization for long term situations as appropriate  Outcome: Progressing     Problem: MUSCULOSKELETAL - ADULT  Goal: Maintain or return mobility to safest level of function  Description: INTERVENTIONS:  - Assess patient's ability to carry out ADLs; assess patient's baseline for ADL function and identify physical deficits which impact ability to perform ADLs (bathing, care of mouth/teeth, toileting, grooming, dressing, etc.)  - Assess/evaluate cause of self-care deficits   - Assess range of motion  - Assess patient's mobility  - Assess patient's need for assistive devices and provide as appropriate  - Encourage maximum independence but intervene and supervise when necessary  - Involve family in performance of ADLs  - Assess for home care needs following discharge   - Consider OT consult to assist with ADL evaluation and planning for discharge  - Provide patient education as appropriate  Outcome: Progressing     Problem: Prexisting or High Potential for Compromised Skin Integrity  Goal: Skin integrity is maintained or improved  Description: INTERVENTIONS:  - Identify patients at risk for skin breakdown  - Assess and monitor skin integrity  - Assess and monitor nutrition and hydration status  - Monitor labs   - Assess for incontinence   - Turn and reposition patient  - Assist with mobility/ambulation  - Relieve pressure over bony prominences  - Avoid friction and shearing  - Provide appropriate hygiene as needed including keeping skin clean and dry  - Evaluate need for skin moisturizer/barrier cream  - Collaborate with interdisciplinary team   - Patient/family teaching  - Consider wound care consult    Outcome: Progressing     Problem: Nutrition/Hydration-ADULT  Goal: Nutrient/Hydration intake appropriate for improving, restoring or maintaining nutritional needs  Description: Monitor and assess patient's nutrition/hydration status for malnutrition. Collaborate with interdisciplinary team and initiate plan and interventions as ordered.  Monitor patient's weight and dietary intake as ordered or per policy. Utilize nutrition screening tool and intervene as necessary. Determine patient's food preferences and provide high-protein, high-caloric foods as appropriate.     INTERVENTIONS:  - Monitor oral intake, urinary output, labs, and treatment plans  - Assess nutrition and hydration status and recommend course of action  - Evaluate amount of meals eaten  - Assist patient with eating if necessary   - Allow adequate time for meals  - Recommend/ encourage appropriate diets, oral nutritional supplements, and vitamin/mineral supplements  - Order, calculate, and assess calorie counts as needed  - Recommend, monitor, and adjust tube feedings and TPN/PPN based on assessed needs  - Assess need for intravenous fluids  - Provide specific nutrition/hydration education as appropriate  - Include patient/family/caregiver in decisions related to nutrition  Outcome: Progressing     - Reposition patient every 2 hours.  - Out of bed for toileting  - Record patient progress and toleration of activity level   Outcome: Progressing

## 2025-03-05 NOTE — CONSULTS
Psychiatry consult was completed on 3/4/25 by Dr. Guzman Polanco. Please see Dr. Polanco's note for full details of Psychiatric consultation. This note was generated to link associated consult order not previously linked to Dr. Polanco's note.

## 2025-03-05 NOTE — ASSESSMENT & PLAN NOTE
Of note, this is a recurrent issue. Patient frequently tried to pull out trach/PEG during combative/agitated episodes.  There was initial concern regarding SI versus suicidal attempt per last admission notes patient was cleared by psych.  Patient received total 9 mg of Versed and was started on Precedex drip for his agitation.  Upon arrival [place] ICU-patient was easily agitated/ uncooperative.    Plan:  Appreciate psych recs  Recommended to Increase Seroquel to 50 mg twice daily and 200 mg at bedtime.  Add lorazepam 1 mg every 6 hours as needed for agitation  Currently on Seroquel 50mg BID with 150mg QHS- can titrate.  Restraint as needed.  One-to-one  QTc within normal.  Monitor on Tele  Routinely check EKG

## 2025-03-05 NOTE — CASE MANAGEMENT
Case Management Assessment & Discharge Planning Note    Patient name Hemanth Swanson  Location ICU 03/ICU 03 MRN [id number]  : 1985 Date 3/5/2025       Current Admission Date: 3/4/2025  Current Admission Diagnosis:Chronic respiratory failure with hypoxia and hypercapnia (HCC)   Patient Active Problem List    Diagnosis Date Noted Date Diagnosed    Copious oral secretions 2025     Mood disorder (HCC) 2024     Abnormal movements 2024     Agitation 2024     History of pulmonary embolus (PE) 2024     Rash of back 10/25/2024     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HCC) 10/17/2024     Palliative care by specialist 10/11/2024     Cardiac arrest due to other underlying condition ([facility]) 2024     Seizure-like activity (HCC) 2024     Ventilator dependent ([facility]) 2024     Obesity hypoventilation syndrome ([facility]) 10/24/2023     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     Impaired mobility 2023     Status post tracheostomy ([facility]) 2023     S/P percutaneous endoscopic gastrostomy (PEG) tube placement ([facility]) 2023     Anxiety 2023     Chronic respiratory failure with hypoxia and hypercapnia ([facility]) 2023     Depression 2023     History of LVH (left ventricular hypertrophy) 2023     Pure hypertriglyceridemia 2023     Unilateral vestibular schwannoma (HCC) 2023     Port-A-Cath in place 2023     Diplopia 2023     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HCC) 2023     Umbilical hernia without obstruction and without gangrene 12/10/2022     Tobacco use 12/10/2022     Retroperitoneal lymphadenopathy 12/10/2022       LOS (days): 1  Geometric Mean LOS (GMLOS) (days):   Days to GMLOS:     OBJECTIVE:  PATIENT READMITTED TO HOSPITAL  Risk of Unplanned Readmission Score: 39.07         Current admission status: Inpatient       Preferred Pharmacy:   [facility]/pharmacy #[id number] - [place] - [facility] [address]  [address]  Phone: [phone] Fax: [phone]    Primary Care Provider: Ela Douglas MD    Primary Insurance: [facility]  Secondary Insurance:     ASSESSMENT:  Active Health Care Proxies       Dmitry Swanson Health Care Representative - Sister   Primary Phone: [phone] (Mobile)                      Patient Information  Mental Status: Sedated    Activities of Daily Living Prior to Admission  Does patient have a history of HHC?:  (Dmitry Caregiver - Sister Dmitry is the Waiver caregiver)  Does patient currently have HHC?: Yes    Current Home Health Care  Home Health Agency Name:: [facility]    Social Determinants of Health (SDOH)      Flowsheet Row Most Recent Value   Housing Stability    In the last 12 months, was there a time when you were not able to pay the mortgage or rent on time? N   In the past 12 months, how many times have you moved where you were living? 1   At any time in the past 12 months, were you homeless or living in a shelter (including now)? N   Transportation Needs    In the past 12 months, has lack of transportation kept you from medical appointments or from getting medications? no   In the past 12 months, has lack of transportation kept you from meetings, work, or from getting things needed for daily living? No   Food Insecurity    Within the past 12 months, you worried that your food would run out before you got the money to buy more. Never true   Within the past 12 months, the food you bought just didn't last and you didn't have money to get more. Never true   Utilities    In the past 12 months has the electric, gas, oil, or water company threatened to shut off services in your home? No            DISCHARGE DETAILS:    Discharge planning discussed with:: Sister Dmitry  Freedom of Choice: Yes  Comments - Freedom of Choice: Home with DEE DEE for HHC/HHA  CM contacted family/caregiver?: Yes  Were Treatment Team discharge recommendations reviewed with patient/caregiver?:  Yes  Did patient/caregiver verbalize understanding of patient care needs?: Yes  Were patient/caregiver advised of the risks associated with not following Treatment Team discharge recommendations?: Yes    Contacts  Patient Contacts: Dmitry Swanson (sister)  Relationship to Patient:: Family  Contact Method: Phone  Phone Number: [phone]  Reason/Outcome: Discharge Planning, Emergency Contact, Continuity of Care    Requested Home Health Care         Is the patient interested in HHC at discharge?: Yes  Home Health Discipline requested:: Home Health Aide, Occupational Therapy, Physical Therapy, Speech Language Pathology  Home Health Agency Name:: [facility]  HHA External Referral Reason (only applicable if external HHA name selected): Patient has established relationship with provider  Home Health Follow-Up Provider:: PCP  Home Health Services Needed:: Strengthening/Theraputic Exercises to Improve Function, Other (comment)  Oxygen LPM Ordered (if applicable based on home health services needed):: 6 LPM  Homebound Criteria Met:: Requires the Assistance of Another Person for Safe Ambulation or to Leave the Home, Uses an Assist Device (i.e. cane, walker, etc)  Supporting Clincal Findings:: Bed Bound or Wheelchair Bound, Requires Oxygen    DME Referral Provided  Referral made for DME?: No    Other Referral/Resources/Interventions Provided:  Interventions: HHA, HHC  Referral Comments: Referral for DEE DEE of [facility]       Treatment Team Recommendation: Home with Home Health Care  Discharge Destination Plan:: Home with Home Health Care        Additional Comments: CM spoke with patient's sister Dmitry who is the Waiver caregiver. Patient is cared for at home by Sister and grandmother through Dmitry [facility]. Sister would like to resume [facility] services for SN, PT/OT and would like Speech added on for communication. CM will place DEE DEE referral in [place] and will continue to follow patient for discharge planning.

## 2025-03-05 NOTE — QUICK NOTE
"Phoned sister Dmitry updating hospital course. Dmitry will come in later today- will eval patient together.    Addendum:  Around 3:30 PM today, sister and Rachel and came in. Patient initially was calm and cooperative- he was able to participate with communication with shaking/ nodding head. However, about 20min later- patient was trying to pull out his trach again- in front of family members(Dmitry and Aunt).    Re-eval patient at bedside- Attending physician Dr. Chung, CC KIRSTIE Mckenzie, primary nurse Radha presented at bedside and attending had very open, detailed discussion with Dmitry - updating hospital course, current status and discuss potential long-term care.    Dmitry agrees that patient needs 24/7 care and she verbalized \"Hemanth acts faster than most of us\"- she is aware that patient can pull out his trach and die at home. We discussed with Dmitry that current best caring option for Hemanth is to place him in a facility e.g. LTAC as a long-term care plan. Dmitry refused adamantly since Hemanth had previous unpleasant exp in a rehab- he was found down without oxygen for about 5 hours. Dmitry talked with patient regarding recent family members loss and Hemanth nodded (promised) to not hurt himself. Dmitry left communication afterward.    A separate conversation was held with patient privately per CC KIRSTIE Mckenzie: \"We talked about how his life is significantly different now than it was prior to his current illness.  He nodded his head agreement that it has been frustrating.  We talked about his self removal of the tracheostomy and PEG tubes previously being related to his frustration with his current condition.  He again nodded his head and agreement with this.  We discussed including palliative care in terms of fully addressing his goals of care moving forward to include the possibility of comfort directed care that would focus on a quality of life as opposed to extending his life.  He nodded agreement with this. We will also " "support him with his family with any decision he made regarding his care.\"    Per chart review- Hemanth had multiple episodes of trying to pull out trach/ PEG. There was rising concern of actual SI/ suicidal attempt. In his last admission- there was a clear documentation \"Dmitry roach was that the behavioral outbursts in which he removes the trach are rare and designed to draw attention when he is uncomfortable or sick.\" This would put Hemanth a high mortality risk at home which we are trying to avoid. Last Ethicist recs also noted.    At this moment, I do think patient has high risk of SI/ SA. We do see there is a potential incongruent goal between patient and caregiver. Will appreciate palliative care re-visit patient's GOC. Will appreciate Ethic input. To discuss with psych service to eval depression.  " Rupture of left Achilles tendon, sequela

## 2025-03-05 NOTE — PLAN OF CARE
Problem: PAIN - ADULT  Goal: Verbalizes/displays adequate comfort level or baseline comfort level  Description: Interventions:  - Encourage patient to monitor pain and request assistance  - Assess pain using appropriate pain scale  - Administer analgesics based on type and severity of pain and evaluate response  - Implement non-pharmacological measures as appropriate and evaluate response  - Consider cultural and social influences on pain and pain management  - Notify physician/advanced practitioner if interventions unsuccessful or patient reports new pain  Outcome: Progressing     Problem: SAFETY ADULT  Goal: Patient will remain free of falls  Description: INTERVENTIONS:  - Educate patient/family on patient safety including physical limitations  - Instruct patient to call for assistance with activity   - Consult OT/PT to assist with strengthening/mobility   - Keep Call bell within reach  - Keep bed low and locked with side rails adjusted as appropriate  - Keep care items and personal belongings within reach  - Initiate and maintain comfort rounds  - Make Fall Risk Sign visible to staff  - Offer Toileting every 2 Hours, in advance of need  - Initiate/Maintain bed alarm  - Obtain necessary fall risk management equipment: call bell, socks, fall signs, bedside table.    - Apply yellow socks and bracelet for high fall risk patients  - Consider moving patient to room near nurses station  Outcome: Progressing  Goal: Maintain or return to baseline ADL function  Description: INTERVENTIONS:  -  Assess patient's ability to carry out ADLs; assess patient's baseline for ADL function and identify physical deficits which impact ability to perform ADLs (bathing, care of mouth/teeth, toileting, grooming, dressing, etc.)  - Assess/evaluate cause of self-care deficits   - Assess range of motion  - Assess patient's mobility; develop plan if impaired  - Assess patient's need for assistive devices and provide as appropriate  -  Encourage maximum independence but intervene and supervise when necessary  - Involve family in performance of ADLs  - Assess for home care needs following discharge   - Consider OT consult to assist with ADL evaluation and planning for discharge  - Provide patient education as appropriate  Outcome: Progressing  Goal: Maintains/Returns to pre admission functional level  Description: INTERVENTIONS:  - Perform AM-PAC 6 Click Basic Mobility/ Daily Activity assessment daily.  - Set and communicate daily mobility goal to care team and patient/family/caregiver.   - Collaborate with rehabilitation services on mobility goals if consulted  - Perform Range of Motion 4 times a day.  - Reposition patient every 2 hours.  - Dangle patient 3 times a day  - Stand patient 3 times a day  - Ambulate patient 3 times a day  - Out of bed to chair 3 times a day   - Out of bed for meals 3 times a day  - Out of bed for toileting  - Record patient progress and toleration of activity level   Outcome: Progressing     Problem: DISCHARGE PLANNING  Goal: Discharge to home or other facility with appropriate resources  Description: INTERVENTIONS:  - Identify barriers to discharge w/patient and caregiver  - Arrange for needed discharge resources and transportation as appropriate  - Identify discharge learning needs (meds, wound care, etc.)  - Arrange for interpretive services to assist at discharge as needed  - Refer to Case Management Department for coordinating discharge planning if the patient needs post-hospital services based on physician/advanced practitioner order or complex needs related to functional status, cognitive ability, or social support system  Outcome: Progressing     Problem: Knowledge Deficit  Goal: Patient/family/caregiver demonstrates understanding of disease process, treatment plan, medications, and discharge instructions  Description: Complete learning assessment and assess knowledge base.  Interventions:  - Provide teaching  at level of understanding  - Provide teaching via preferred learning methods  Outcome: Progressing     Problem: SAFETY,RESTRAINT: NV/NON-SELF DESTRUCTIVE BEHAVIOR  Goal: Remains free of harm/injury (restraint for non violent/non self-detsructive behavior)  Description: INTERVENTIONS:  - Instruct patient/family regarding restraint use   - Assess and monitor physiologic and psychological status   - Provide interventions and comfort measures to meet assessed patient needs   - Identify and implement measures to help patient regain control  - Assess readiness for release of restraint   Outcome: Progressing  Goal: Returns to optimal restraint-free functioning  Description: INTERVENTIONS:  - Assess the patient's behavior and symptoms that indicate continued need for restraint  - Identify and implement measures to help patient regain control  - Assess readiness for release of restraint   Outcome: Progressing     Problem: NEUROSENSORY - ADULT  Goal: Achieves stable or improved neurological status  Description: INTERVENTIONS  - Monitor and report changes in neurological status  - Monitor vital signs such as temperature, blood pressure, glucose, and any other labs ordered   - Initiate measures to prevent increased intracranial pressure  - Monitor for seizure activity and implement precautions if appropriate      Outcome: Progressing     Problem: CARDIOVASCULAR - ADULT  Goal: Absence of cardiac dysrhythmias or at baseline rhythm  Description: INTERVENTIONS:  - Continuous cardiac monitoring, vital signs, obtain 12 lead EKG if ordered  - Administer antiarrhythmic and heart rate control medications as ordered  - Monitor electrolytes and administer replacement therapy as ordered  Outcome: Progressing     Problem: RESPIRATORY - ADULT  Goal: Achieves optimal ventilation and oxygenation  Description: INTERVENTIONS:  - Assess for changes in respiratory status  - Assess for changes in mentation and behavior  - Position to facilitate  oxygenation and minimize respiratory effort  - Oxygen administered by appropriate delivery if ordered  - Initiate smoking cessation education as indicated  - Encourage broncho-pulmonary hygiene including cough, deep breathe, Incentive Spirometry  - Assess the need for suctioning and aspirate as needed  - Assess and instruct to report SOB or any respiratory difficulty  - Respiratory Therapy support as indicated  Outcome: Progressing     Problem: GENITOURINARY - ADULT  Goal: Absence of urinary retention  Description: INTERVENTIONS:  - Assess patient’s ability to void and empty bladder  - Monitor I/O  - Bladder scan as needed  - Discuss with physician/AP medications to alleviate retention as needed  - Discuss catheterization for long term situations as appropriate  Outcome: Progressing     Problem: MUSCULOSKELETAL - ADULT  Goal: Maintain or return mobility to safest level of function  Description: INTERVENTIONS:  - Assess patient's ability to carry out ADLs; assess patient's baseline for ADL function and identify physical deficits which impact ability to perform ADLs (bathing, care of mouth/teeth, toileting, grooming, dressing, etc.)  - Assess/evaluate cause of self-care deficits   - Assess range of motion  - Assess patient's mobility  - Assess patient's need for assistive devices and provide as appropriate  - Encourage maximum independence but intervene and supervise when necessary  - Involve family in performance of ADLs  - Assess for home care needs following discharge   - Consider OT consult to assist with ADL evaluation and planning for discharge  - Provide patient education as appropriate  Outcome: Progressing     Problem: Prexisting or High Potential for Compromised Skin Integrity  Goal: Skin integrity is maintained or improved  Description: INTERVENTIONS:  - Identify patients at risk for skin breakdown  - Assess and monitor skin integrity  - Assess and monitor nutrition and hydration status  - Monitor labs   -  Assess for incontinence   - Turn and reposition patient  - Assist with mobility/ambulation  - Relieve pressure over bony prominences  - Avoid friction and shearing  - Provide appropriate hygiene as needed including keeping skin clean and dry  - Evaluate need for skin moisturizer/barrier cream  - Collaborate with interdisciplinary team   - Patient/family teaching  - Consider wound care consult   Outcome: Progressing     Problem: Nutrition/Hydration-ADULT  Goal: Nutrient/Hydration intake appropriate for improving, restoring or maintaining nutritional needs  Description: Monitor and assess patient's nutrition/hydration status for malnutrition. Collaborate with interdisciplinary team and initiate plan and interventions as ordered.  Monitor patient's weight and dietary intake as ordered or per policy. Utilize nutrition screening tool and intervene as necessary. Determine patient's food preferences and provide high-protein, high-caloric foods as appropriate.     INTERVENTIONS:  - Monitor oral intake, urinary output, labs, and treatment plans  - Assess nutrition and hydration status and recommend course of action  - Evaluate amount of meals eaten  - Assist patient with eating if necessary   - Allow adequate time for meals  - Recommend/ encourage appropriate diets, oral nutritional supplements, and vitamin/mineral supplements  - Order, calculate, and assess calorie counts as needed  - Recommend, monitor, and adjust tube feedings and TPN/PPN based on assessed needs  - Assess need for intravenous fluids  - Provide specific nutrition/hydration education as appropriate  - Include patient/family/caregiver in decisions related to nutrition  Outcome: Progressing

## 2025-03-05 NOTE — SEPSIS NOTE
Sepsis Note   Hemanth Swanson 39 y.o. male MRN: [id number]  Unit/Bed#: ICU 03 Encounter: [id number]       Initial Sepsis Screening       Row Name 03/04/25 2338                Is the patient's history suggestive of a new or worsening infection? No  -JE                  User Key  (r) = Recorded By, (t) = Taken By, (c) = Cosigned By      Initials Name Provider Type    AMADOR Cuadra PA-C Physician Assistant                        Body mass index is 28.81 kg/m².  Wt Readings from Last 1 Encounters:   03/04/25 107 kg (236 lb 8.9 oz)     IBW (Ideal Body Weight): 86.76 kg    Ideal body weight: 86.8 kg (191 lb 4.5 oz)  Adjusted ideal body weight: 95 kg (209 lb 6.2 oz)    Infectious workup completed today with no suspected source. Negative procal, CXR without new consolidation, no leukocytosis, afebrile.Continue to monitor off abx.

## 2025-03-06 ENCOUNTER — TELEPHONE (OUTPATIENT)
Dept: PSYCHIATRY | Facility: CLINIC | Age: 40
End: 2025-03-06

## 2025-03-06 PROBLEM — Z51.5 PALLIATIVE CARE PATIENT: Status: ACTIVE | Noted: 2025-03-06

## 2025-03-06 PROBLEM — R45.89 SUICIDAL RISK: Status: ACTIVE | Noted: 2025-03-06

## 2025-03-06 PROCEDURE — 94640 AIRWAY INHALATION TREATMENT: CPT

## 2025-03-06 PROCEDURE — 94150 VITAL CAPACITY TEST: CPT

## 2025-03-06 PROCEDURE — 99255 IP/OBS CONSLTJ NEW/EST HI 80: CPT | Performed by: SURGERY

## 2025-03-06 PROCEDURE — 94760 N-INVAS EAR/PLS OXIMETRY 1: CPT

## 2025-03-06 PROCEDURE — 99291 CRITICAL CARE FIRST HOUR: CPT | Performed by: INTERNAL MEDICINE

## 2025-03-06 PROCEDURE — 94003 VENT MGMT INPAT SUBQ DAY: CPT

## 2025-03-06 RX ORDER — ACETAMINOPHEN 160 MG/5ML
975 SUSPENSION ORAL EVERY 6 HOURS PRN
Status: DISCONTINUED | OUTPATIENT
Start: 2025-03-06 | End: 2025-03-06

## 2025-03-06 RX ORDER — ACETAMINOPHEN 10 MG/ML
1000 INJECTION, SOLUTION INTRAVENOUS EVERY 6 HOURS PRN
Status: DISCONTINUED | OUTPATIENT
Start: 2025-03-06 | End: 2025-03-07

## 2025-03-06 RX ORDER — ESCITALOPRAM OXALATE 10 MG/1
10 TABLET ORAL DAILY
Status: DISCONTINUED | OUTPATIENT
Start: 2025-03-06 | End: 2025-03-06

## 2025-03-06 RX ORDER — ESCITALOPRAM OXALATE 10 MG/1
10 TABLET ORAL DAILY
Status: DISCONTINUED | OUTPATIENT
Start: 2025-03-06 | End: 2025-03-08 | Stop reason: HOSPADM

## 2025-03-06 RX ORDER — LORAZEPAM 2 MG/ML
1 INJECTION INTRAMUSCULAR ONCE
Status: COMPLETED | OUTPATIENT
Start: 2025-03-06 | End: 2025-03-06

## 2025-03-06 RX ORDER — LORAZEPAM 2 MG/ML
1 INJECTION INTRAMUSCULAR ONCE
Status: DISCONTINUED | OUTPATIENT
Start: 2025-03-05 | End: 2025-03-06

## 2025-03-06 RX ORDER — OLANZAPINE 10 MG/2ML
5 INJECTION, POWDER, FOR SOLUTION INTRAMUSCULAR EVERY 6 HOURS PRN
Status: DISCONTINUED | OUTPATIENT
Start: 2025-03-06 | End: 2025-03-07

## 2025-03-06 RX ADMIN — ACETYLCYSTEINE 600 MG: 200 SOLUTION ORAL; RESPIRATORY (INHALATION) at 20:03

## 2025-03-06 RX ADMIN — IPRATROPIUM BROMIDE AND ALBUTEROL SULFATE 3 ML: .5; 3 SOLUTION RESPIRATORY (INHALATION) at 07:25

## 2025-03-06 RX ADMIN — APIXABAN 5 MG: 5 TABLET, FILM COATED ORAL at 08:12

## 2025-03-06 RX ADMIN — IPRATROPIUM BROMIDE AND ALBUTEROL SULFATE 3 ML: .5; 3 SOLUTION RESPIRATORY (INHALATION) at 20:03

## 2025-03-06 RX ADMIN — OLANZAPINE 5 MG: 10 INJECTION, POWDER, FOR SOLUTION INTRAMUSCULAR at 20:59

## 2025-03-06 RX ADMIN — ACETYLCYSTEINE 3 ML: 200 SOLUTION ORAL; RESPIRATORY (INHALATION) at 13:23

## 2025-03-06 RX ADMIN — IPRATROPIUM BROMIDE AND ALBUTEROL SULFATE 3 ML: .5; 3 SOLUTION RESPIRATORY (INHALATION) at 13:23

## 2025-03-06 RX ADMIN — IPRATROPIUM BROMIDE AND ALBUTEROL SULFATE 3 ML: .5; 3 SOLUTION RESPIRATORY (INHALATION) at 03:13

## 2025-03-06 RX ADMIN — ACETAMINOPHEN 650 MG: 650 SUSPENSION ORAL at 00:40

## 2025-03-06 RX ADMIN — CHLORHEXIDINE GLUCONATE 0.12% ORAL RINSE 15 ML: 1.2 LIQUID ORAL at 21:03

## 2025-03-06 RX ADMIN — ACETYLCYSTEINE 3 ML: 200 SOLUTION ORAL; RESPIRATORY (INHALATION) at 07:24

## 2025-03-06 RX ADMIN — QUETIAPINE FUMARATE 50 MG: 25 TABLET ORAL at 08:12

## 2025-03-06 RX ADMIN — ACETYLCYSTEINE 600 MG: 200 SOLUTION ORAL; RESPIRATORY (INHALATION) at 03:14

## 2025-03-06 RX ADMIN — LORAZEPAM 1 MG: 2 INJECTION INTRAMUSCULAR; INTRAVENOUS at 15:51

## 2025-03-06 RX ADMIN — ACETAMINOPHEN 650 MG: 650 SUSPENSION ORAL at 08:12

## 2025-03-06 NOTE — ASSESSMENT & PLAN NOTE
Follows with Neurology as outpt, [facility] for 4/9/25  T/c Neurology consult while inpt

## 2025-03-06 NOTE — DISCHARGE INSTR - OTHER ORDERS
Wound Care Plan:   1-Low air-loss mattress.  2-Elevate heels off of bed/chair surface to offload pressure.  3-Turn/reposition every 2 hours for pressure re-distribution on skin.   4-Apply lotion to body daily and as needed.  5-Apply Remedy silicone cream to bilateral buttocks, sacrum, and heels three times daily and as needed with incontinence care.  6-Left mid/lateral ear--cleanse with normal saline, pat dry.  Apply small piece of calcium alginate (Melgisorb) and cover with silicone bordered foam dressing.  Change dressing every other day.  Offload pressure to ear as often as possible.  7-Left thumb--cleanse with normal saline, pat dry.  Apply Dermagran and cover with silicone bordered foam dressing.  Change dressing every other day and as needed.  8-Use Optifoam trach dressing with routine trach care.  Place Interdry under trach ties on left side of neck--change Interdry daily and as needed with soilage.

## 2025-03-06 NOTE — PROGRESS NOTES
Progress Note - Critical Care/ICU   Name: Hemanth Swanson 39 y.o. male I MRN: [id number]  Unit/Bed#: ICU 03 I Date of Admission: 3/4/2025   Date of Service: 3/6/2025 I Hospital Day: 2      Assessment & Plan  Chronic respiratory failure with hypoxia and hypercapnia (HCC)  S/p trach/ vent dependent for neuromuscular disease  Of note- Chronic settings of 18/500/8/40%  In the ED- VB.41-53.7-50.3-33.4  5.0 XLT Shiley trach.  However, of note 6.0  Known history of mucous plugging & intentional holding breath    Plan:  Continue full vent support  Pulmonary toilet; continue home duo-nebs and 3% nebs q6h and chest PT TID   Maintain O2 sat 90% or above   VAP preventions; chlorhexidine, PPI, HOB greater than 30 degrees  Agitation  Of note, this is a recurrent issue. Patient frequently tried to pull out trach/PEG during combative/agitated episodes.  There was initial concern regarding SI versus suicidal attempt per last admission notes patient was cleared by psych.  Patient received total 9 mg of Versed and was started on Precedex drip for his agitation.  Upon arrival [facility]-patient was easily agitated/ uncooperative.    Plan:  Appreciate psych recs  Currently on Seroquel 50mg BID with 200mg QHS  Lorazepam 1 mg every 6 hours as needed for agitation  Consider adding anti-depressant- will discuss with psych  Restraint as needed.  One-to-one  QTc within normal.  Monitor on Tele  Routinely check EKG  Mood disorder (HCC)  Seen above.  Ventilator dependent (HCC)  Patient and family wish to stay in the home  Family very capable of caring for patient.  However, in the setting of recurrent episodes of agitation, pulling out trach/PEG - it appears to be more appropriate for patient to stay in long-term care facility- Discussed with sister/ Caregiver Dmitry- she verbalized patient mentioned before that he would die instead of staying in the facility and she clearly refused potential LTAC placement.  Appreciate palliative care  "consult  History of pulmonary embolus (PE)  Continue home Eliquis  Mixed axonal-demyelinating polyneuropathy  Follows with neurology  Supportive care  Status post tracheostomy (HCC)  Noted history. Vent dependent  S/P percutaneous endoscopic gastrostomy (PEG) tube placement (HCC)  Recently replaced 2/13 after self removal  Continue tube feedings at goal   Palliative care patient    Suicidal risk  It is difficult to clearly eval patient's SI/ SA since nonverbal, noncooperative and behavioral issue.   However, per chart review- There was always a rising concern of actual SI/ suicidal attempt. In his last admission- there was a clear documentation \"Dmitry rebuttal was that the behavioral outbursts in which he removes the trach are rare and designed to draw attention when he is uncomfortable or sick.\"  Patient admitted he is very frustrated to his current situation and he is aware that he is at very high mortality risk after pulling out his Trach.  Pulling out trach/ PEG ---> ?Suicidal attempt   Would appreciate psych service revisit  Continue 1:1 close monitoring.  Disposition: Critical care    ICU Core Measures     Vented Patient  VAP Bundle  VAP bundle ordered     A: Assess, Prevent, and Manage Pain Has pain been assessed? Yes  Need for changes to pain regimen? No   B: Both Spontaneous Awakening Trials (SATs) and Spontaneous Breathing Trials (SBTs) Plan to perform spontaneous awakening trial today? Chronic vent   Plan to perform spontaneous breathing trial today? Chronic vent   Obvious barriers to extubation? NA   C: Choice of Sedation RASS Goal: 0 Alert and Calm  Need for changes to sedation or analgesia regimen? NA   D: Delirium CAM-ICU: Negative   E: Early Mobility  Plan for early mobility? Yes   F: Family Engagement Plan for family engagement today? Yes       Review of Invasive Devices:      Central access plan:  chronic port      Prophylaxis:  VTE VTE covered by:  apixaban, Per PEG Tube, 5 mg at 03/05/25 7362     " "  Stress Ulcer  not ordered         24 Hour Events :     Yesterday afternoon, patient's mood/behavior initially improved-which changed his left arm to soft restraint.  However there was another episode of \"trying to pull out trach \"with family presented. Please refer to my quick note on 3/5.    ?SI/ SA    Requiring Ativan overnight for agitation/aggressive/combative behaviors.  Left arm locked restraint reapplied.    Palliative care consult      Subjective   Review of Systems: Review of Systems not obtainable due to Uncooperative patient    Objective :                   Vitals I/O      Most Recent Min/Max in 24hrs   Temp 99.6 °F (37.6 °C) Temp  Min: 98.1 °F (36.7 °C)  Max: 101.6 °F (38.7 °C)   Pulse 92 Pulse  Min: 87  Max: 134   Resp (!) 25 Resp  Min: 10  Max: 47   /69 BP  Min: 92/50  Max: 120/68   O2 Sat 91 % SpO2  Min: 86 %  Max: 97 %      Intake/Output Summary (Last 24 hours) at 3/6/2025 0715  Last data filed at 3/6/2025 0400  Gross per 24 hour   Intake 1923 ml   Output 1500 ml   Net 423 ml       Diet Enteral/Parenteral; Tube Feeding No Oral Diet; Jevity 1.5; Continuous; 65; 100; Water; Every 4 hours    Invasive Monitoring           Physical Exam   Physical Exam  Vitals and nursing note reviewed.   Eyes:      General:         Right eye: No discharge.         Left eye: No discharge.      Comments: Eyes open   Skin:     General: Skin is warm.   HENT:      Mouth/Throat:      Mouth: Mucous membranes are moist.   Neck:      Trachea: Tracheostomy present.   Cardiovascular:      Rate and Rhythm: Normal rate and regular rhythm.      Pulses: Normal pulses.   Musculoskeletal:      Right lower leg: No edema.      Left lower leg: No edema.      Comments: B/L LE- soft restraints  LUE locked restraint   Abdominal: General: Bowel sounds are normal.      Palpations: Abdomen is soft.   Constitutional:       General: He is not in acute distress.  Pulmonary:      Breath sounds: Rales (bibasilar) present.      Comments: On " vent  Neurological:      Mental Status: He is alert. He is calm.      Comments: Uncooperative    Genitourinary/Anorectal:  external catheter present.        Diagnostic Studies        Lab Results: I have reviewed the following results:     Medications:  Scheduled PRN   acetylcysteine, 3 mL, Q6H  apixaban, 5 mg, BID  chlorhexidine, 15 mL, Q12H ISAEL  ipratropium-albuterol, 3 mL, Q6H  LORazepam, 1 mg, Once  QUEtiapine, 200 mg, HS  QUEtiapine, 50 mg, BID      acetaminophen, 650 mg, Q4H PRN  LORazepam, 1 mg, Q6H PRN  polyethylene glycol, 17 g, Daily PRN       Continuous          Labs:   CBC    Recent Labs     03/05/25  0508   WBC 14.17*   HGB 9.7*   HCT 30.8*        BMP    Recent Labs     03/05/25  0508   SODIUM 135   K 3.7   CL 94*   CO2 32   AGAP 9   BUN 13   CREATININE 0.48*   CALCIUM 9.1       Coags    No recent results     Additional Electrolytes  Recent Labs     03/05/25  0508   MG 2.0   PHOS 4.5   CAIONIZED 1.17          Blood Gas    No recent results  No recent results LFTs  Recent Labs     03/05/25  0508   ALT 13   AST 13   ALKPHOS 84   ALB 3.7   TBILI 0.57       Infectious  Recent Labs     03/05/25  0508   PROCALCITONI 0.13     Glucose  Recent Labs     03/05/25  0508   GLUC 80

## 2025-03-06 NOTE — PLAN OF CARE
Problem: PAIN - ADULT  Goal: Verbalizes/displays adequate comfort level or baseline comfort level  Description: Interventions:  - Encourage patient to monitor pain and request assistance  - Assess pain using appropriate pain scale  - Administer analgesics based on type and severity of pain and evaluate response  - Implement non-pharmacological measures as appropriate and evaluate response  - Consider cultural and social influences on pain and pain management  - Notify physician/advanced practitioner if interventions unsuccessful or patient reports new pain  Outcome: Progressing     Problem: SAFETY ADULT  Goal: Patient will remain free of falls  Description: INTERVENTIONS:  - Educate patient/family on patient safety including physical limitations  - Instruct patient to call for assistance with activity   - Consult OT/PT to assist with strengthening/mobility   - Keep Call bell within reach  - Keep bed low and locked with side rails adjusted as appropriate  - Keep care items and personal belongings within reach  - Initiate and maintain comfort rounds  - Make Fall Risk Sign visible to staff  - Offer Toileting every 2 Hours, in advance of need  - Initiate/Maintain bed alarm  - Obtain necessary fall risk management equipment:   - Apply yellow socks and bracelet for high fall risk patients  - Consider moving patient to room near nurses station  Outcome: Progressing  Goal: Maintain or return to baseline ADL function  Description: INTERVENTIONS:  -  Assess patient's ability to carry out ADLs; assess patient's baseline for ADL function and identify physical deficits which impact ability to perform ADLs (bathing, care of mouth/teeth, toileting, grooming, dressing, etc.)  - Assess/evaluate cause of self-care deficits   - Assess range of motion  - Assess patient's mobility; develop plan if impaired  - Assess patient's need for assistive devices and provide as appropriate  - Encourage maximum independence but intervene and  supervise when necessary  - Involve family in performance of ADLs  - Assess for home care needs following discharge   - Consider OT consult to assist with ADL evaluation and planning for discharge  - Provide patient education as appropriate  Outcome: Progressing  Goal: Maintains/Returns to pre admission functional level  Description: INTERVENTIONS:  - Perform AM-PAC 6 Click Basic Mobility/ Daily Activity assessment daily.  - Set and communicate daily mobility goal to care team and patient/family/caregiver.   - Collaborate with rehabilitation services on mobility goals if consulted  - Perform Range of Motion 3 times a day.  - Reposition patient every 2 hours.    Problem: DISCHARGE PLANNING  Goal: Discharge to home or other facility with appropriate resources  Description: INTERVENTIONS:  - Identify barriers to discharge w/patient and caregiver  - Arrange for needed discharge resources and transportation as appropriate  - Identify discharge learning needs (meds, wound care, etc.)  - Arrange for interpretive services to assist at discharge as needed  - Refer to Case Management Department for coordinating discharge planning if the patient needs post-hospital services based on physician/advanced practitioner order or complex needs related to functional status, cognitive ability, or social support system  Outcome: Progressing     Problem: Knowledge Deficit  Goal: Patient/family/caregiver demonstrates understanding of disease process, treatment plan, medications, and discharge instructions  Description: Complete learning assessment and assess knowledge base.  Interventions:  - Provide teaching at level of understanding  - Provide teaching via preferred learning methods  Outcome: Progressing     Problem: SAFETY,RESTRAINT: NV/NON-SELF DESTRUCTIVE BEHAVIOR  Goal: Remains free of harm/injury (restraint for non violent/non self-detsructive behavior)  Description: INTERVENTIONS:  - Instruct patient/family regarding restraint use    - Assess and monitor physiologic and psychological status   - Provide interventions and comfort measures to meet assessed patient needs   - Identify and implement measures to help patient regain control  - Assess readiness for release of restraint   Outcome: Progressing  Goal: Returns to optimal restraint-free functioning  Description: INTERVENTIONS:  - Assess the patient's behavior and symptoms that indicate continued need for restraint  - Identify and implement measures to help patient regain control  - Assess readiness for release of restraint   Outcome: Progressing     Problem: NEUROSENSORY - ADULT  Goal: Achieves stable or improved neurological status  Description: INTERVENTIONS  - Monitor and report changes in neurological status  - Monitor vital signs such as temperature, blood pressure, glucose, and any other labs ordered   - Initiate measures to prevent increased intracranial pressure  - Monitor for seizure activity and implement precautions if appropriate      Outcome: Progressing     Problem: CARDIOVASCULAR - ADULT  Goal: Absence of cardiac dysrhythmias or at baseline rhythm  Description: INTERVENTIONS:  - Continuous cardiac monitoring, vital signs, obtain 12 lead EKG if ordered  - Administer antiarrhythmic and heart rate control medications as ordered  - Monitor electrolytes and administer replacement therapy as ordered  Outcome: Progressing     Problem: RESPIRATORY - ADULT  Goal: Achieves optimal ventilation and oxygenation  Description: INTERVENTIONS:  - Assess for changes in respiratory status  - Assess for changes in mentation and behavior  - Position to facilitate oxygenation and minimize respiratory effort  - Oxygen administered by appropriate delivery if ordered  - Initiate smoking cessation education as indicated  - Encourage broncho-pulmonary hygiene including cough, deep breathe, Incentive Spirometry  - Assess the need for suctioning and aspirate as needed  - Assess and instruct to report SOB  or any respiratory difficulty  - Respiratory Therapy support as indicated  Outcome: Progressing     Problem: GENITOURINARY - ADULT  Goal: Absence of urinary retention  Description: INTERVENTIONS:  - Assess patient’s ability to void and empty bladder  - Monitor I/O  - Bladder scan as needed  - Discuss with physician/AP medications to alleviate retention as needed  - Discuss catheterization for long term situations as appropriate  Outcome: Progressing     Problem: MUSCULOSKELETAL - ADULT  Goal: Maintain or return mobility to safest level of function  Description: INTERVENTIONS:  - Assess patient's ability to carry out ADLs; assess patient's baseline for ADL function and identify physical deficits which impact ability to perform ADLs (bathing, care of mouth/teeth, toileting, grooming, dressing, etc.)  - Assess/evaluate cause of self-care deficits   - Assess range of motion  - Assess patient's mobility  - Assess patient's need for assistive devices and provide as appropriate  - Encourage maximum independence but intervene and supervise when necessary  - Involve family in performance of ADLs  - Assess for home care needs following discharge   - Consider OT consult to assist with ADL evaluation and planning for discharge  - Provide patient education as appropriate  Outcome: Progressing     - Out of bed for toileting  - Record patient progress and toleration of activity level   Outcome: Progressing

## 2025-03-06 NOTE — ASSESSMENT & PLAN NOTE
Of note, this is a recurrent issue. Patient frequently tried to pull out trach/PEG during combative/agitated episodes.  There was initial concern regarding SI versus suicidal attempt per last admission notes patient was cleared by psych.  Patient received total 9 mg of Versed and was started on Precedex drip for his agitation.  Upon arrival [facility] ICU-patient was easily agitated/ uncooperative.    Plan:  Appreciate psych recs  Currently on Seroquel 50mg BID with 200mg QHS  Lorazepam 1 mg every 6 hours as needed for agitation  Consider adding anti-depressant- will discuss with psych  Restraint as needed.  One-to-one  QTc within normal.  Monitor on Tele  Routinely check EKG

## 2025-03-06 NOTE — RESPIRATORY THERAPY NOTE
Called to pt's room, VT approx 50 mls , sats dropping, placed pt on (s)cmv), at 100% sats dropped to 49 quickly prepared to bag pt, sat rapidly increased to 100% consulted with medical team requested pt be placed on initial mode APVcmv    Readings normalized VT = 398, RR = 17, sat = 92%

## 2025-03-06 NOTE — ASSESSMENT & PLAN NOTE
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 Concern if patient safer at facility versus home  This is patient's 4th hospitalizations in last 6 months for agitation, 2xremoving PEG, possible removal of trach at home  There is concern that patient will continue to have episodes of agitation/impulsivity and be at risk for dangerous situation at home if attempts to remove trach and/or PEG  There have been difficulties with outpt f/u to ensure symptoms of agitation/impulsivity are managed  Sister/Dmitry adamant that patient does not want to be in facility  Patient seen by palliative care during previous hospitalizations, has had 1x home visit for f/u on 2/26/25; fabio f/u f/u 3/24/25  Per last note, no safety concerns in the home  d/w sister/Dmitry by phone. She states patient frustrates easily, can be impulsive, but has had a very hard life. She states he was a  about 1.5 years ago and this has been devastating. She tries to care for him along with family. Patient living for son/Burke/4yo. Son lives with his mother/Lorene but asks to see/visit with patient frequently. Sister thinks patient seeing son would boost his mood. Encouraged FT. Sister reports patient has suffered from depression/bipolar since youth. She says he does communicate with her and when he shrugs his shoulders, she encourages him to respond. She is agreeable to  transfer to [facility] for further w/u, treatment for condition, but she is not agreeable to facility, she promised patient she would never do that to him. Briefly discussed comfort care but not interested, feel they haven't even figured out why patient is in this situation and patient wants to keep living  Called sister/Dmitry back, patient's mother/Steph(Dmitry's aunt) also on phone  Reviewed POA paperwork, appears financial, no separate medical POA/LW/advanced directive  Page #4 on our scan, patient initialed POA has power to provide personal and family maintenance; likely to include medical decision making on patient's behalf; even so, patient's mother/Steph, sister/Dmitry, brother/Anoop and 2 additional siblings all on same page regarding goals of care  All do NOT want patient to go to LTAC facility and feel he will receive better care at home from family  All do want a 2nd opinion regarding his condition and if there are any options for treatment/improvement  Initially seen with ICU RN staff, answered patient's consistently with nodding yes or no; returned and attempted with ipad/white board - difficult time with this; changed to patient pointing to NO or YES written on white board(patient consistent with this, attempted to [place] with each question, instructed to point to NO or YES for responses  Patient indicated he is NOT suicidal and that he is not trying to end his life  Patient indicated he does NOT want to go to a facility and wants to return home when stable to do so  Patient indicates that he IS depressed, but not trying to end his life or harm himself  He is agreeable to antidepressants  He is unable to indicate why he has attempted to remove his trach or PEG in past; he admits to frustration/agitation at times  At first, he indicated he would not want to go to [facility] for further w/u and possible treatment, he changed his answer when informed that sister/Dmitry was agreeable to this  He indicates he is familiar  with hospice; he indicates that he does NOT want comfort care or liberation from vent; he indicates he wants to CONTINUE with ventilator support  Palliative care will follow intermittently as needed; will return in person on Monday 3/10/25; please contact palliative care on-call with questions/concerns or if need to readdress goals

## 2025-03-06 NOTE — ASSESSMENT & PLAN NOTE
Patient and family wish to stay in the home  Family very capable of caring for patient.  However, in the setting of recurrent episodes of agitation, pulling out trach/PEG - it appears to be more appropriate for patient to stay in long-term care facility- Discussed with sister/ Caregiver Dmitry- she verbalized patient mentioned before that he would die instead of staying in the facility and she clearly refused potential LTAC placement.  Appreciate palliative care consult

## 2025-03-06 NOTE — TELEMEDICINE
Tele-Consultation - Behavioral Health   Name: Hemanth Swanson 39 y.o. male I MRN: [id number]  Unit/Bed#: ICU 03 I Date of Admission: 3/4/2025   Date of Service: 3/6/2025 I Hospital Day: 2   Inpatient consult to Psychiatry  Consult performed by: Jp Aj MD  Consult ordered by: Navarro Lara MD        Physician Requesting Evaluation: Jovan Rockwell*   Reason for Evaluation / Principal Problem: To assess for mood disorder; agitation, suicidal ideation    Assessment & Plan  Chronic respiratory failure with hypoxia and hypercapnia (HCC)    S/P percutaneous endoscopic gastrostomy (PEG) tube placement (HCC)    Status post tracheostomy (HCC)    Mixed axonal-demyelinating polyneuropathy    Ventilator dependent (HCC)    History of pulmonary embolus (PE)    Agitation    Mood disorder (HCC)    Palliative care patient    Suicidal risk    I have discussed the above management plan in detail with the primary service.     39-year-old male with unspecified mood disorder and impulse control disorder unspecified reason for Consult: To assess for mood disorder; agitation; suicide risk    TREATMENT PLAN RECOMMENDATIONS:  The patient gives his informed consent to start Lexapro 10 mg p.o. daily for depression and anxiety.  Continue Seroquel at current dosing which will also serve as antidepressant adjunct to Lexapro.  Recommend replacing Ativan with Zyprexa 5 to 10 mg p.o. or IM every 6 hours as needed agitation/impulsivity.  Although he denies any suicidal ideation, given his history of attempts to remove the trach, recommend continuing one-to-one suicide precautions for now while continuing to assess for stability.  Upon discharge, recommend outpatient psychiatric follow-up to include medication management with a psychotherapy/counseling component to assist the patient in dealing with his grief issues and frustrations.  The patient may benefit from meeting with pastoral care while here in the hospital.  Reconsult  psychiatry as needed.    Current medications:  Current Facility-Administered Medications   Medication Dose Route Frequency Provider Last Rate    acetaminophen  650 mg Oral Q4H PRN AMBER Fleming      acetylcysteine  3 mL Nebulization Q6H Jovan Holden MD      apixaban  5 mg Per PEG Tube BID Navarro Lara MD      chlorhexidine  15 mL Mouth/Throat Q12H [facility] Navarro Lara MD      ipratropium-albuterol  3 mL Nebulization Q6H Navarro Lara MD      LORazepam  1 mg Intravenous Once Hermelindo Cuadra PA-C      LORazepam  1 mg Per PEG Tube Q6H PRN Navarro Lara MD      polyethylene glycol  17 g Oral Daily PRN Navarro Lara MD      QUEtiapine  200 mg Per G Tube HS AMBER Fleming      QUEtiapine  50 mg Per PEG Tube BID AMBER Fleming          Risks/Benefits of Treatment:     Risks, benefits, and possible side effects of medications explained to patient and patient verbalizes understanding and agreement for treatment.    Disposition: The patient does not currently meet criteria for inpatient psychiatric hospitalization. They deny thoughts or plans for suicide and denies homicidal thoughts or intent. They are not unable to care for themselves due to a mental illness and/or acute psychosis. They are able to adequately participate in care planning with primary team. No criteria for an involuntary psychiatric commitment exists at this time.    Legal Status Recommendation:  Voluntary    Other/Medical Work Up and/or treatment modality recommendations: N/A to this case.    Patient Caregiver/Family Education: Provided education regarding relevant aspects of the treatment plan to identified patient support based on their needs and abilities in a manner that they could understand with the patient's express consent.    Follow-up: Re-consult PRN    History of Present Illness      Hemanth Swanson is a 39 y.o. male for whom psychiatry consult is requested to evaluate for mood disorder, agitation and suicide risk.  This is a  "follow-up psychiatry consult to that provided by Dr. Polanco on March 4, 2025.  From that consult: \"Assessment & Plan  Unspecified mood disorder  Impulse control disorder unspecified     TREATMENT PLAN RECOMMENDATIONS:  Medications: Increase Seroquel to 50 mg twice daily and 200 mg at bedtime.  Add lorazepam 1 mg every 6 hours as needed for agitation        Informed consent for the above medication has been obtained including discussion of the risks, benefits and alternatives: No. Unable to obtain due to patient condition, lack of cooperation.     Disposition: Was unable to complete interview with patient at this time due to lack of cooperation , please re-consult when patient is able to be interviewed.     Legal Status Recommendation:  n/a     Multiple Antipsychotic Review: N/A     Psychotherapy/Psychoeducation: N/A to this case.     Other/Medical Work Up and/or treatment modality recommendations: N/A to this case.     Patient Caregiver/Family Education: N/A     Follow-up: Re-consult PRN     Report regarding the above Assessment and Treatment plan was provided to: Dr. Ramos        History of Present Illness  Patient is a 39 y.o. male with a history of Bipolar Disorder, type I who was presented to the hospital due to agitation and concern regarding suicidal ideation.  During the evaluation patient allow the psychiatrist to do this consult but later he become more guarded and uncooperative and did not respond to the psychiatrist questions.  Most of the information was obtained from the record and the staff.  As.  Patient has been getting agitated on and off and try to remove his tracheostomy tube and family has concern that he has been feeling severely depressed and may have suicidal ideation.      Historical Information  Past Psychiatric History: Unable to obtain     Substance Abuse History:           E-Cigarette/Vaping    E-Cigarette Use Former User      Start Date 1/1/18      Quit Date 6/1/23              Social " "History         Tobacco History         Smoking Status  Former Smoking Start Date  2008 Quit Date  2023 Average Packs/Day  0.7 packs/day for 22.7 years (15.0 ttl pk-yrs) Smoking Tobacco Type  Cigarettes from 2008 to 2023   Pack Year History       Packs/Day From To Years     0 2023   1.8     0.5 2008 15.4     1     7.3        Passive Exposure  Never        Smokeless Tobacco Use  Never                  Alcohol History         Alcohol Use Status  Not Currently Drinks/Week  0 Glasses of wine, 0 Cans of beer, 0 Shots of liquor, 0 Standard drinks or equivalent per week Comment  Former alcoholic. Last use in 2016                  Drug Use         Drug Use Status  Not Currently Types  \"Crack\" cocaine, Marijuana Comment  Current use medical marijuana. Not currently using crack cocaine.                  Sexual Activity         Sexually Active  Not Currently Partners  Female Birth Control/Protection  Other                  Other Factors    Not Asked                                  Social History:     Social History  Social History               Socioeconomic History    Marital status: Single       Spouse name: Not on file    Number of children: Not on file    Years of education: Not on file    Highest education level: Not on file   Occupational History    Not on file   Tobacco Use    Smoking status: Former       Current packs/day: 0.00       Average packs/day: 0.7 packs/day for 22.7 years (15.0 ttl pk-yrs)       Types: Cigarettes       Start date: 2008       Quit date: 2023       Years since quittin.7       Passive exposure: Never    Smokeless tobacco: Never   Vaping Use    Vaping status: Former    Start date: 2018    Quit date: 2023    Substances: Nicotine, THC   Substance and Sexual Activity    Alcohol use: Not Currently       Comment: Former alcoholic. Last use in 2016    Drug use: Not Currently       Types: \"Crack\" cocaine, Marijuana       Comment: Current use medical " marijuana. Not currently using crack cocaine.    Sexual activity: Not Currently       Partners: Female       Birth control/protection: Other   Other Topics Concern    Not on file   Social History Narrative     ** Merged History Encounter **            Social Drivers of Health           Financial Resource Strain: Low Risk  (2024)     Overall Financial Resource Strain (CARDIA)      Difficulty of Paying Living Expenses: Not hard at all   Food Insecurity: Patient Unable To Answer (3/4/2025)     Nursing - Inadequate Food Risk Classification      Worried About Running Out of Food in the Last Year: Never true      Ran Out of Food in the Last Year: Never true      Ran Out of Food in the Last Year: Patient unable to answer   Transportation Needs: Patient Unable To Answer (3/4/2025)     Nursing - Transportation Risk Classification      Lack of Transportation: Not on file      Lack of Transportation: Patient unable to answer   Physical Activity: Not on file   Stress: Not on file   Social Connections: Not on file   Intimate Partner Violence: Patient Unable To Answer (3/4/2025)     Nursing IPS      Feels Physically and Emotionally Safe: Not on file      Physically Hurt by Someone: Not on file      Humiliated or Emotionally Abused by Someone: Not on file      Physically Hurt by Someone: Patient unable to answer      Hurt or Threatened by Someone: Patient unable to answer   Housing Stability: Patient Unable To Answer (3/4/2025)     Nursing: Inadequate Housing Risk Classification      Has Housing: Not on file      Worried About Losing Housing: Not on file      Unable to Get Utilities: Not on file      Unable to Pay for Housing in the Last Year: Patient unable to answer      Has Housin                              Past Medical History:     Medical History        Past Medical History:   Diagnosis Date    Anxiety      Cancer (HCC)      Depression      Hypernatremia 10/06/2024    Migration of percutaneous endoscopic  "gastrostomy (PEG) tube ([facility]) 09/24/2023    Psychiatric disorder       bipolar    Sepsis ([facility]) 08/24/2023         Medical History Pertinent Negatives            Meds/Allergies  Allergies[]Expand by Default        Allergies   Allergen Reactions    Dog Epithelium Cough, Sneezing and Nasal Congestion         all current active meds have been reviewed     Medical Review Of Systems:     Review of Systems  \"    Critical care has provided the following assessment and plan update today: \"Assessment:  39 y.o. male with a h/o testicular cancer, neuromuscular dysfunction, trach/PEG, PE/eliquis, non verbal at baseline,       3/3 - P/w acute agitation at home, tried to pull trach/PEG. Reported normal yesterday. To [facility] ED, required versed/combative, precedex started on vent support. No signs if infection, unremarkable labs, no hypercarbia     3/4 - psych consult, recs quetiapine 50 bid, 200 qhs, prn ativan 1mg iv q6. Off precedex/developed bradycardia      3/5 - switch quetiapine 50 bid, 200 mg hs. Family discussion at bedside/ethics/palliative consult     ON: combative, given ativan iv 1mg x1. Tried to remove the trach     S: sleepy this am      /62 (BP Location: Right arm)   Pulse 91   Temp 99.9 °F (37.7 °C) (Axillary)   Resp 16   Wt 107 kg (236 lb 8.9 oz)   SpO2 91%   BMI 28.81 kg/m²  Trach cuffed shiley #5 in place, normal respiratory efforts. RR, mild peripheral edema.     Labs: wbc 14.17 yesterday      CXR 3/5: hypoventilatory changes, no new focal infiltrates      Principal Problem:    Chronic respiratory failure with hypoxia and hypercapnia ([facility])  Active Problems:    S/P percutaneous endoscopic gastrostomy (PEG) tube placement ([facility])    Status post tracheostomy ([facility])    Mixed axonal-demyelinating polyneuropathy    Ventilator dependent ([facility])    History of pulmonary embolus (PE)    Agitation    Mood disorder ([facility])              Impression:  Agitation - unclear etiology, possibly frustration/suicidal   Agitated " "on admission, reported by family, not improving with additional quetiapine 25 mg    At baseline used to be cooperative, does PTOT  Off precedex since 3/4  Multiple discussion with pt yesterday, states that he is frustrated, communicates by nodding his head, intermittently able to write  Neuromuscular dysfunction  Prior extensive work up was negative  S/p treatment with high dose steroids/IVIG  See neurology notes 10/18/2024  Unknown etiology at this point  Chronic respiratory failure-vent dependent 24/7  Review multiple chest x-rays/CTs with bilateral elevated diaphragms  Suspect as part of the neuromuscular dysfunction/diaphragmatic paralysis contributing to the respiratory failure  SBT this a.m.: Bradypnea/very low tidal volume 100 to 200 cc with hypoxia  PEG tube  Hx PE   Mood disorder  Leukocytosis-likely reactive        Plan:  Psych following,  Ask about initiating antidepressant, previously was on Cymbalta but wasn't covered by insurance    Continue quetiapine 50 mg bid/200 mg hs  Required IV ativan overnight due to agitation  PO form didn't work  Still needs PO regimen  Neurology consult  ?further work up for phrenic nerve paralysis   Prior concern for diaphragmatic paralysis/paresis   Could be a candidate for diaphragmatic pacers  Reviewed multiple CT/CXR  Palliative care/ethics consult   Family still very adamant about taking him home  However unable to provide 24 nursing care, missed some OP follow up   Now with concern for agitation/removing the trach   Leukocytosis noted yesterday, CXR no new infiltrates, PCT negative   Monitor off antibiotics for now  PMR/OT consult, for better tools for communications  Able to write on the white board  Continue mucous clearing therapy mucomyst/duoneb  Continue vent support at home settings  Riding the vent  Brief SBT: apnea/very low tv 100-200cc  Continue critical care level\"      In meeting with the patient, he was lying supine in bed with his trach in place.  Affect " "appears somewhat depressed and constricted.  He responded by writing on a writing board.  He was not agreeable to answering open-ended questions but did agree to answer close to any questions with a yes or no.  When asked if depressed or anxious the patient responded \"no\".  He denies suicidal ideation/plan/intent.  He denied experiencing any pain at this time.  He denied hallucinations and other psychotic features.  When asked to describe his emotions and one-word he wrote \"pissed off\".  He was open to medication to help with his emotions at this time.  Insight and judgment appear to be intact at this time.    Substance Abuse History:    Social History       Tobacco History       Smoking Status  Former Smoking Start Date  1/1/2008 Quit Date  6/1/2023 Average Packs/Day  0.7 packs/day for 22.7 years (15.0 ttl pk-yrs) Smoking Tobacco Type  Cigarettes from 1/1/2008 to 6/1/2023   Pack Year History     Packs/Day From To Years    0 6/1/2023  1.8    0.5 1/1/2008 6/1/2023 15.4    1   7.3      Passive Exposure  Never      Smokeless Tobacco Use  Never              Alcohol History       Alcohol Use Status  Not Currently Drinks/Week  0 Glasses of wine, 0 Cans of beer, 0 Shots of liquor, 0 Standard drinks or equivalent per week Comment  Former alcoholic. Last use in 2016              Drug Use       Drug Use Status  Not Currently Types  \"Crack\" cocaine, Marijuana Comment  Current use medical marijuana. Not currently using crack cocaine.              Sexual Activity       Sexually Active  Not Currently Partners  Female Birth Control/Protection  Other              Other Factors    Not Asked                   I have assessed this patient for substance use within the past 12 months      Past Medical History:   Diagnosis Date    Anxiety     Bradycardia 06/01/2024    Cancer (HCC)     Depression     Hypernatremia 10/06/2024    Migration of percutaneous endoscopic gastrostomy (PEG) tube (HCC) 09/24/2023    Psychiatric disorder     bipolar "    Sepsis (HCC) 08/24/2023     Past Surgical History:   Procedure Laterality Date    IR BIOPSY LYMPH NODE  01/20/2023    IR GASTROSTOMY (G) TUBE CHECK/CHANGE/REINSERTION/UPSIZE  06/18/2024    IR GASTROSTOMY TUBE PLACEMENT  09/25/2023    IR LUMBAR PUNCTURE  09/06/2023    IR LUMBAR PUNCTURE  10/25/2024    IR PORT PLACEMENT  03/14/2023    ORCHIECTOMY Left 12/14/2022    Procedure: ORCHIECTOMY INGUINAL;  Surgeon: Flip Michael MD;  Location:  MAIN OR;  Service: Urology    PEG W/TRACHEOSTOMY PLACEMENT N/A 09/08/2023    Procedure: TRACHEOSTOMY WITH INSERTION PEG TUBE;  Surgeon: Rudy Mcmanus MD;  Location: [facility];  Service: General    TESTICLE SURGERY       Meds/Allergies      Objective :  Temp:  [98.1 °F (36.7 °C)-101.6 °F (38.7 °C)] 99.9 °F (37.7 °C)  HR:  [] 95  BP: ()/(50-71) 90/62  Resp:  [10-47] 19  SpO2:  [86 %-97 %] 93 %  O2 Device: Ventilator  FiO2 (%):  [35] 35    Temp:  [98.1 °F (36.7 °C)-101.6 °F (38.7 °C)] 99.9 °F (37.7 °C)  HR:  [] 95  BP: ()/(50-71) 90/62  Resp:  [10-47] 19  SpO2:  [86 %-97 %] 93 %  O2 Device: Ventilator  FiO2 (%):  [35] 35        Lab Results: I have reviewed the following results:  Results from the past 24 hours: No results found for this or any previous visit (from the past 24 hours).    Imaging Results Review: No pertinent imaging studies reviewed.  Other Study Results Review: EKG was reviewed.     Code Status: Level 1 - Full Code  Advance Directive and Living Will:      Power of : Yes  POLST:      Jp Aj MD 03/06/25    Administrative Statements   VIRTUAL CARE DOCUMENTATION:     1. This service was provided via Telemedicine using Teams Virtual Rounding      2. Parties in the room with patient during teleconsult Patient only    3. Confidentiality My office door was closed     4. Participants No one else was in the room    5. Patient acknowledged consent and understanding of privacy and security of the  Telemedicine consult. I informed  the patient that I have reviewed their record in Epic and presented the opportunity for them to ask any questions regarding the visit today.  The patient agreed to participate.    6. I have spent a total time of 30 minutes in caring for this patient on the day of the visit/encounter including Diagnostic results, Prognosis, Risks and benefits of tx options, Instructions for management, Patient and family education, Importance of tx compliance, Risk factor reductions, Impressions, Counseling / Coordination of care, Documenting in the medical record, Reviewing/placing orders in the medical record (including tests, medications, and/or procedures), Obtaining or reviewing history  , and Communicating with other healthcare professionals , not including the time spent for establishing the audio/video connection.

## 2025-03-06 NOTE — ASSESSMENT & PLAN NOTE
Ventilator dependent d/t neuromuscular disease  On 18/500/40%  H/o mucous plugging an intentional breath holding  Follows with Pulmonology, f/u fabio for 3/11/25

## 2025-03-06 NOTE — WOUND OSTOMY CARE
Consult Note - Wound   Hemanth Swanson 39 y.o. male MRN: [id number]  Unit/Bed#: ICU 03 Encounter: [id number]      History and Present Illness:  39 year old male presented to the hospital with agitation.  Patient's history significant for mixed axonal demyelinating polyneuropathy, PE, tracheostomy, PEG tube.    Assessment Findings:   Patient assessed along with primary RN.  He is on low air-loss mattress with ATR positioning system in place.  Dependent for turning/repositioning.  Incontinent of bowel and bladder with male purewick in place for incontinence management.  Nutrition team following, receiving tube feeding.  Bilateral heels intact, pink, and blanchable with preventative foam dressings in place.  Blanchable erythema to right lateral foot and left elbow with preventative foam dressings in place.  Sacrum and buttocks intact with scar tissue present and blanchable.   Dry, brown eschars to left fingers/hand.  Right medial foot--presents as recently healed scar tissue.  However, cannot rule out pressure component due to scattered, non-blanchable maroon areas.  Epidermis intact.  Donna-wound intact and blanchable.  Present on admission pressure injury to left lateral ear--difficult to visualize base of wound due to depth and positioning.  At least stage 3 with 50% pink and 50% yellow slough.  Small serous drainage.  Donna-wound with hyperpigmentation.  Left thumb traumatic wound--from patient picking per primary RN.  Beefy red, full thickness tissue loss with small, bloody drainage. Donna-wound intact.  MASD to left neck (under trach ties)--moist with blanchable erythema.  No satellite lesions at this time.      See flowsheet for wound details.    Wound Care Plan:   1-Low air-loss mattress with ATR positioning system.  2-Elevate heels off of bed/chair surface to offload pressure.  3-Turn/reposition every 2 hours for pressure re-distribution on skin.   4-Apply moisturizing skin cream to body daily and as  needed.  5-Apply silicone bordered foam dressings to bilateral heels and left elbow for prevention.  Chepe with P.  Peel back for skin assessments at least daily and re-apply.  Change dressings every 3 days and as needed.  6-Apply silicone bordered foam dressing to sacrum for prevention.  Chepe with P.  Peel back at least daily for skin assessment and re-apply.  Change dressing every other day and as needed.  7-Left mid/lateral ear--cleanse with normal saline, pat dry.  Apply small piece of calcium alginate (Melgisorb) and cover with silicone bordered foam dressing.  Change dressing every other day.  Offload pressure to ear as often as possible.  8-Left thumb--cleanse with normal saline, pat dry.  Apply Dermagran and cover with silicone bordered foam dressing.  Change dressing every other day and as needed.  9-Use Optifoam trach dressing with routine trach care.  Place Interdry under trach ties on left side of neck--change Interdry daily and as needed with soilage.    Wound care team to follow.  Plan of care reviewed with primary RN.    Critical care AP made aware of assessment findings.    Wound 12/16/24 Traumatic Finger D1, thumb Left;Posterior (Active)   Wound Image   03/06/25 1323   Wound Description Beefy red 03/06/25 1323   Donna-wound Assessment Intact 03/06/25 1323   Wound Length (cm) 0.7 cm 03/06/25 1323   Wound Width (cm) 1.5 cm 03/06/25 1323   Wound Depth (cm) 0.2 cm 03/06/25 1323   Wound Surface Area (cm^2) 1.05 cm^2 03/06/25 1323   Wound Volume (cm^3) 0.21 cm^3 03/06/25 1323   Calculated Wound Volume (cm^3) 0.21 cm^3 03/06/25 1323   Change in Wound Size % 4.55 03/06/25 1323   Drainage Amount Small 03/06/25 1323   Drainage Description Bloody 03/06/25 1323   Non-staged Wound Description Full thickness 03/06/25 1323   Treatments Cleansed 03/06/25 1323   Dressing Dermagran gauze;Foam, Silicon (eg. Allevyn, etc) 03/06/25 1323   Dressing Changed New 03/06/25 1323   Patient Tolerance Tolerated well 03/06/25 0028    Dressing Status Dry;Intact;Clean 03/06/25 1323       Wound 02/17/25 Traumatic Abrasion(s) Finger D2, index Left;Posterior (Active)   Wound Image   03/04/25 1821   Wound Description Dry;Brown 03/06/25 1311   Donna-wound Assessment Pink 03/06/25 1311   Dressing Open to air 03/06/25 1311       Wound 03/04/25 Pressure Injury Foot Right;Medial (Active)   Wound Image   03/06/25 1304   Wound Description Intact 03/06/25 1304   Donna-wound Assessment Intact 03/06/25 1304   Wound Length (cm) 1 cm 03/06/25 1304   Wound Width (cm) 1.5 cm 03/06/25 1304   Wound Depth (cm) 0 cm 03/06/25 1304   Wound Surface Area (cm^2) 1.5 cm^2 03/06/25 1304   Wound Volume (cm^3) 0 cm^3 03/06/25 1304   Calculated Wound Volume (cm^3) 0 cm^3 03/06/25 1304   Drainage Amount None 03/06/25 1304   Dressing Open to air 03/06/25 1304       Wound 03/05/25 Pressure Injury Ear Left (Active)   Wound Image    03/06/25 1311   Wound Description Pink;Yellow;Slough 03/06/25 1311   Pressure Injury Stage 3 03/06/25 1311   Donna-wound Assessment Hyperpigmented;Intact 03/06/25 1311   Wound Length (cm) 0.5 cm 03/06/25 1311   Wound Width (cm) 1.3 cm 03/06/25 1311   Wound Depth (cm) 0.3 cm 03/06/25 1311   Wound Surface Area (cm^2) 0.65 cm^2 03/06/25 1311   Wound Volume (cm^3) 0.195 cm^3 03/06/25 1311   Calculated Wound Volume (cm^3) 0.2 cm^3 03/06/25 1311   Drainage Amount Small 03/06/25 1311   Drainage Description Serous 03/06/25 1311   Non-staged Wound Description Full thickness 03/06/25 1311   Treatments Cleansed 03/06/25 1311   Dressing Foam, Silicon (eg. Allevyn, etc) 03/06/25 1311   Dressing Changed Changed 03/06/25 1311   Patient Tolerance Tolerated well 03/06/25 1311   Dressing Status Clean;Dry;Intact 03/06/25 1311       Merced Estrada RN, BSN, CWON

## 2025-03-06 NOTE — CONSULTS
Consultation - Palliative Care   Name: Hemanth Swanson 39 y.o. 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 Concern if patient safer at facility versus home  This is patient's 4th hospitalizations in last 6 months for agitation, 2xremoving PEG, possible removal of trach at home  There is concern that patient will continue to have episodes of agitation/impulsivity and be at risk for dangerous situation at home if attempts to remove trach and/or PEG  There have been difficulties with outpt f/u to ensure symptoms of agitation/impulsivity are managed  Sister/Dmitry adamant that patient does not want to be in facility  Patient seen by palliative care during previous hospitalizations, has had 1x home visit for f/u on 2/26/25; fabio f/u f/u 3/24/25  Per last note, no safety concerns in the home  d/w sister/Dmitry by phone. She states patient frustrates easily, can be impulsive, but has had a very hard life. She states  he was a  about 1.5 years ago and this has been devastating. She tries to care for him along with family. Patient living for son/Burke/4yo. Son lives with his mother/Lorene but asks to see/visit with patient frequently. Sister thinks patient seeing son would boost his mood. Encouraged FT. Sister reports patient has suffered from depression/bipolar since youth. She says he does communicate with her and when he shrugs his shoulders, she encourages him to respond. She is agreeable to transfer to [place] for further w/u, treatment for condition, but she is not agreeable to facility, she promised patient she would never do that to him. Briefly discussed comfort care but not interested, feel they haven't even figured out why patient is in this situation and patient wants to keep living  Called sister/Dmitry back, patient's mother/Steph(Dmitry's aunt) also on phone  Reviewed POA paperwork, appears financial, no separate medical POA/LW/advanced directive  Page #4 on our scan, patient initialed POA has power to provide personal and family maintenance; likely to include medical decision making on patient's behalf; even so, patient's mother/Steph, sister/Dmitry, brother/Anoop and 2 additional siblings all on same page regarding goals of care  All do NOT want patient to go to LTAC facility and feel he will receive better care at home from family  All do want a 2nd opinion regarding his condition and if there are any options for treatment/improvement  Initially seen with ICU RN staff, answered patient's consistently with nodding yes or no; returned and attempted with ipad/white board - difficult time with this; changed to patient pointing to NO or YES written on white board(patient consistent with this, attempted to [place] with each question, instructed to point to NO or YES for responses  Patient indicated he is NOT suicidal and that he is not trying to end his life  Patient indicated he does NOT want to go to a facility and  wants to return home when stable to do so  Patient indicates that he IS depressed, but not trying to end his life or harm himself  He is agreeable to antidepressants  He is unable to indicate why he has attempted to remove his trach or PEG in past; he admits to frustration/agitation at times  At first, he indicated he would not want to go to [place] for further w/u and possible treatment, he changed his answer when informed that sister/Dmitry was agreeable to this  He indicates he is familiar with hospice; he indicates that he does NOT want comfort care or liberation from vent; he indicates he wants to CONTINUE with ventilator support  Palliative care will follow intermittently as needed; will return in person on Monday 3/10/25; please contact palliative care on-call with questions/concerns or if need to readdress goals  Mixed axonal-demyelinating polyneuropathy  Follows with Neurology as outpt, [facility] for 4/9/25  T/c Neurology consult while inpt  Chronic respiratory failure with hypoxia and hypercapnia (HCC)  Ventilator dependent d/t neuromuscular disease  On 18/500/40%  H/o mucous plugging an intentional breath holding  Follows with Pulmonology, f/u [facility] for 3/11/25  Ventilator dependent (HCC)  Patient and family wish to stay in home, refuse potential LTAC placement  Status post tracheostomy (HCC)  Vent dependent  S/P percutaneous endoscopic gastrostomy (PEG) tube placement ([facility])  Recently replaced d/t self removal, 2/13/25  Continue TF at goal  History of pulmonary embolus (PE)  On Eliquis  Mood disorder (HCC)  Impulse control disorder unspecified  Psych eval on 3/4/25, unable to complete interview with patient d/t lack of cooperation  Agitation  Psyh eval 3/4/25  Rec seroquel 50mg bid, 200mg qhs + ativan 1mg q6h prn   Repeat psych eval today    Code Status: Level 1 - Full Code  Advance Directive and Living Will:      Power of : Yes, financial but on page #4 patient initialed POA has power to provide personal and  family maintenance, names /Dmitry Swanson [phone]  POLST:    Decisional apparatus:  Patient with MARGINAL capacity to make medical decisions on my exam today. If such capacity is lost, patient's substitute decision maker would default to mother/Steph Freire [phone] [place] Act 169.  Patient names /Dmitry as POA agent #1, brother Christmaraer as co-agent   There are 2 siblings to mother's first relationship, 2 siblings to 2nd  All on same page regarding patient's goals of care, wishes  Dmitry has been primary caretaker/advocate with family assisting  Lives with sister Dmitry, nieces and nephews      History of Present Illness   HPI: Hemanth Swanson is a 39 y.o. year old male who presents with acute agitation. Patient given higher dose of seroquel at home, became combative with family/caretakers, tried pulling out trach/PEG. Received versed 4mg by EMS with additional 5mg in ED, started on precedex and admitted to ICU. PMH significant for mixed axonal demyelinating polyneuropathy, chronic respiratory failure, trach/PEG dependent, h/o PE on Eliquis. Patient non-verbal, communicates by nodding head and hand gestures. Family concerned for suicidal ideation versus suicidal attempt. Behavioral Health consulted, recommended increasing seroquel to 50mg bid and 200mg qhs, lorazepam 1mg q6h prn agitation. Unable to complete interview due to lack of cooperation, asked to be re-consulted when patient is able to be interviewed. Patient follows with our outpt home Palliative CRNP, last seen 2/26/25, goals are disease focused and to remain in home. Per notes, patient's sister is primary caretaker and decision maker. This is patient's 4th hospitalization in last 6 months(there appear to be multiple additional ED presentations, but these are presentations at different sites while awaiting ICU bed availability/admission at other sites). Per ICU note, concerned for recurrent episodes of agitation, pulling out trach/PEG and  feel it is more appropriate for patient to stay in LTC facility; sister/caregiver/[facility] Dmitry told ICU team that patient mentioned he would rather die instead of staying in facility and clearly refused LTAC placement. Palliative care consulted for goals of care and symptom management.     Review of Systems   Unable to perform ROS: Other   On vent    Historical Information   Past Medical History:   Diagnosis Date    Anxiety     Bradycardia 06/01/2024    Cancer (HCC)     Depression     Hypernatremia 10/06/2024    Migration of percutaneous endoscopic gastrostomy (PEG) tube (HCC) 09/24/2023    Psychiatric disorder     bipolar    Sepsis ([facility]) 08/24/2023     Patient Active Problem List   Diagnosis    Umbilical hernia without obstruction and without gangrene    Tobacco use    Retroperitoneal lymphadenopathy    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[facility])    Diplopia    Port-A-Cath in place    Unilateral vestibular schwannoma ([facility])    History of LVH (left ventricular hypertrophy)    Pure hypertriglyceridemia    Depression    Chronic respiratory failure with hypoxia and hypercapnia ([facility])    Anxiety    S/P percutaneous endoscopic gastrostomy (PEG) tube placement ([facility])    Status post tracheostomy ([facility])    Impaired mobility    Mixed axonal-demyelinating polyneuropathy    Obesity hypoventilation syndrome (HCC)    Ventilator dependent ([facility])    Seizure-like activity ([facility])    Cardiac arrest due to other underlying condition ([facility])    Palliative care by specialist    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[facility])    Rash of back    History of pulmonary embolus (PE)    Agitation    Mood disorder ([facility])    Abnormal movements    Copious oral secretions     Past Surgical History:   Procedure Laterality Date    IR BIOPSY LYMPH NODE  01/20/2023    IR GASTROSTOMY (G) TUBE CHECK/CHANGE/REINSERTION/UPSIZE  06/18/2024    IR GASTROSTOMY TUBE PLACEMENT  09/25/2023    IR LUMBAR PUNCTURE  09/06/2023    IR LUMBAR PUNCTURE  10/25/2024    IR PORT PLACEMENT  03/14/2023    ORCHIECTOMY Left  "2022    Procedure: ORCHIECTOMY INGUINAL;  Surgeon: Flip Michael MD;  Location:  MAIN OR;  Service: Urology    PEG W/TRACHEOSTOMY PLACEMENT N/A 2023    Procedure: TRACHEOSTOMY WITH INSERTION PEG TUBE;  Surgeon: Rudy Mcmanus MD;  Location: [facility] MAIN OR;  Service: General    TESTICLE SURGERY       Social History     Socioeconomic History    Marital status: Single     Spouse name: None    Number of children: None    Years of education: None    Highest education level: None   Occupational History    None   Tobacco Use    Smoking status: Former     Current packs/day: 0.00     Average packs/day: 0.7 packs/day for 22.7 years (15.0 ttl pk-yrs)     Types: Cigarettes     Start date: 2008     Quit date: 2023     Years since quittin.7     Passive exposure: Never    Smokeless tobacco: Never   Vaping Use    Vaping status: Former    Start date: 2018    Quit date: 2023    Substances: Nicotine, THC   Substance and Sexual Activity    Alcohol use: Not Currently     Comment: 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 Not currently using crack cocaine.    Sexual activity: Not Currently     Partners: Female     Birth control/protection: Other   Other Topics Concern    None   Social History Narrative    ** Merged History Encounter **          Social Drivers of Health     Financial Resource Strain: Low Risk  (2024)    Overall Financial Resource Strain (CARDIA)     Difficulty of Paying Living Expenses: Not hard at all   Food Insecurity: No Food Insecurity (3/4/2025)    Hunger Vital Sign     Worried About Running Out of Food in the Last Year: Never true     Ran Out of Food in the Last Year: Never true   Transportation Needs: No Transportation Needs (3/4/2025)    PRAPARE - Transportation     Lack of Transportation (Medical): No     Lack of Transportation (Non-Medical): No   Physical Activity: Not " on file   Stress: Not on file   Social Connections: Not on file   Intimate Partner Violence: Patient Unable To Answer (3/4/2025)    Nursing IPS     Feels Physically and Emotionally Safe: Not on file     Physically Hurt by Someone: Not on file     Humiliated or Emotionally Abused by Someone: Not on file     Physically Hurt by Someone: Patient unable to answer     Hurt or Threatened by Someone: Patient unable to answer   Housing Stability: Low Risk  (3/4/2025)    Housing Stability Vital Sign     Unable to Pay for Housing in the Last Year: No     Number of Times Moved in the Last Year: 1     Homeless in the Last Year: No     Family History   Problem Relation Age of Onset    Coronary artery disease Maternal Aunt     Cancer Father     Diabetes Father     Hypertension Father        Meds/Allergies   all current active meds have been reviewed and current meds:   Current Facility-Administered Medications:     acetaminophen (TYLENOL) oral suspension 650 mg, Q4H PRN    acetylcysteine (MUCOMYST) 200 mg/mL inhalation solution 600 mg, Q6H    apixaban (ELIQUIS) tablet 5 mg, BID    chlorhexidine (PERIDEX) 0.12 % oral rinse 15 mL, Q12H ISAEL    ipratropium-albuterol (DUO-NEB) 0.5-2.5 mg/3 mL inhalation solution 3 mL, Q6H    LORazepam (ATIVAN) injection 1 mg, Once    LORazepam (ATIVAN) tablet 1 mg, Q6H PRN    polyethylene glycol (MIRALAX) packet 17 g, Daily PRN    QUEtiapine (SEROquel) tablet 200 mg, HS    QUEtiapine (SEROquel) tablet 50 mg, BID    Allergies   Allergen Reactions    Dog Epithelium Cough, Sneezing and Nasal Congestion       I have reviewed the patient's controlled substance dispensing history in the Prescription Drug Monitoring Program in compliance with the [place] regulations before prescribing any controlled substances.   Last fill:  10/22/24: testosterone Cyp 200mg/ml, #4    Objective   BP 96/58   Pulse 90   Temp 99.6 °F (37.6 °C) (Axillary)   Resp 16   Wt 107 kg (236 lb 8.9 oz)   SpO2 90%   BMI 28.81 kg/m²  "  Physical Exam  Vitals and nursing note reviewed.   Constitutional:       General: He is not in acute distress.     Appearance: He is ill-appearing.      Comments: Somnolent on initial encounter, more awake later in am, on 1:1   HENT:      Head: Normocephalic and atraumatic.      Nose: Nose normal.   Eyes:      General: No scleral icterus.     Extraocular Movements: Extraocular movements intact.   Cardiovascular:      Rate and Rhythm: Normal rate.   Pulmonary:      Effort: Pulmonary effort is normal. No respiratory distress.      Breath sounds: No stridor.      Comments: Trach intact, on vent  Abdominal:      General: There is no distension.      Palpations: Abdomen is soft.      Tenderness: There is no abdominal tenderness.   Musculoskeletal:         General: Swelling present.   Skin:     General: Skin is warm and dry.   Neurological:      Mental Status: He is alert and oriented to person, place, and time.   Psychiatric:      Comments: Uncertain thought content/judgment, intermittent agitation         Lab Results: I have personally reviewed pertinent labs., CBC: No results found for: \"WBC\", \"HGB\", \"HCT\", \"MCV\", \"PLT\", \"ADJUSTEDWBC\", \"RBC\", \"MCH\", \"MCHC\", \"RDW\", \"MPV\", \"NRBC\", CMP: No results found for: \"SODIUM\", \"K\", \"CL\", \"CO2\", \"ANIONGAP\", \"BUN\", \"CREATININE\", \"GLUCOSE\", \"CALCIUM\", \"AST\", \"ALT\", \"ALKPHOS\", \"PROT\", \"BILITOT\", \"EGFR\", PT/PTT:No results found for: \"PT\", \"PTT\"  Albumin:  0   Lab Value Date/Time    ALB 3.7 03/05/2025 0508    ALB 4.2 12/19/2023 1204     Imaging Studies: Results Review Statement: I reviewed radiology reports from this admission including: chest xray.  EKG, Pathology, and Other Studies: Results Review Statement: I reviewed radiology reports from this admission including: ekg.  QTC: 412    Counseling / Coordination of Care  Total floor / unit time spent today 90 minutes. Greater than 50% of total time was spent with the patient and / or family counseling and / or coordination of care. A " description of the counseling / coordination of care: current condition, goals of care, hospice discussion/comfort transition, coordination of care, emotional support.     Stan Bravo,   Palliative and Supportive Care  [phone]

## 2025-03-06 NOTE — QUICK NOTE
Phoned patient's sister Dmitry updating hospital course.     Dmitry verbalized strong interest to have patient set up with [facility] neurology/ pulm for further w/u.

## 2025-03-06 NOTE — RESPIRATORY THERAPY NOTE
03/06/25 0728   Maintenance   Alarm (pink) cable attached No   Resuscitation bag with peep valve at bedside Yes   Water bag changed No   Circuit changed No   Daily Screen   Patient safety screen outcome: Failed   Not Ready for Weaning due to: Underline problem not resolved   Spont breathing trial outcome:   (n/a)   IHI Ventilator Associated Pneumonia Bundle   Daily Awakening Trials Performed Not applicable (Comment)   Daily Assessment of Readiness to Extubate Not applicable (Comment)   Head of Bed Elevated HOB 30   Oral Care Suction swab     RT Ventilator Management Note  Hemanth Swanson 39 y.o. male MRN: [id number]  Unit/Bed#: ICU 03 Encounter: [id number]      Daily Screen         3/5/2025  2012 3/6/2025  0728          Patient safety screen outcome:: Failed Failed      Not Ready for Weaning due to:: Underline problem not resolved Underline problem not resolved      Spont breathing trial outcome:: -- --                Physical Exam:   Assessment Type: During-treatment  General Appearance: Sedated  Respiratory Pattern: Assisted  Chest Assessment: Chest expansion symmetrical    Home vent dependent pt - no weaning , sbt unless ordered by AP team

## 2025-03-06 NOTE — PLAN OF CARE
Problem: PAIN - ADULT  Goal: Verbalizes/displays adequate comfort level or baseline comfort level  Description: Interventions:  - Encourage patient to monitor pain and request assistance  - Assess pain using appropriate pain scale  - Administer analgesics based on type and severity of pain and evaluate response  - Implement non-pharmacological measures as appropriate and evaluate response  - Consider cultural and social influences on pain and pain management  - Notify physician/advanced practitioner if interventions unsuccessful or patient reports new pain  Outcome: Progressing     Problem: SAFETY ADULT  Goal: Patient will remain free of falls  Description: INTERVENTIONS:  - Educate patient/family on patient safety including physical limitations  - Instruct patient to call for assistance with activity   - Consult OT/PT to assist with strengthening/mobility   - Keep Call bell within reach  - Keep bed low and locked with side rails adjusted as appropriate  - Keep care items and personal belongings within reach  - Initiate and maintain comfort rounds  - Make Fall Risk Sign visible to staff  - Offer Toileting every 2 Hours, in advance of need  - Initiate/Maintain bed alarm  - Apply yellow socks and bracelet for high fall risk patients  - Consider moving patient to room near nurses station  Outcome: Progressing  Goal: Maintain or return to baseline ADL function  Description: INTERVENTIONS:  -  Assess patient's ability to carry out ADLs; assess patient's baseline for ADL function and identify physical deficits which impact ability to perform ADLs (bathing, care of mouth/teeth, toileting, grooming, dressing, etc.)  - Assess/evaluate cause of self-care deficits   - Assess range of motion  - Assess patient's mobility; develop plan if impaired  - Assess patient's need for assistive devices and provide as appropriate  - Encourage maximum independence but intervene and supervise when necessary  - Involve family in performance  of ADLs  - Assess for home care needs following discharge   - Consider OT consult to assist with ADL evaluation and planning for discharge  - Provide patient education as appropriate  Outcome: Progressing  Goal: Maintains/Returns to pre admission functional level  Description: INTERVENTIONS:  - Perform AM-PAC 6 Click Basic Mobility/ Daily Activity assessment daily.  - Set and communicate daily mobility goal to care team and patient/family/caregiver.   - Collaborate with rehabilitation services on mobility goals if consulted  - Perform Range of Motion 3 times a day.  - Reposition patient every 2 hours.  - Dangle patient 3 times a day  - Out of bed for toileting  - Record patient progress and toleration of activity level   Outcome: Progressing     Problem: DISCHARGE PLANNING  Goal: Discharge to home or other facility with appropriate resources  Description: INTERVENTIONS:  - Identify barriers to discharge w/patient and caregiver  - Arrange for needed discharge resources and transportation as appropriate  - Identify discharge learning needs (meds, wound care, etc.)  - Arrange for interpretive services to assist at discharge as needed  - Refer to Case Management Department for coordinating discharge planning if the patient needs post-hospital services based on physician/advanced practitioner order or complex needs related to functional status, cognitive ability, or social support system  Outcome: Progressing     Problem: Knowledge Deficit  Goal: Patient/family/caregiver demonstrates understanding of disease process, treatment plan, medications, and discharge instructions  Description: Complete learning assessment and assess knowledge base.  Interventions:  - Provide teaching at level of understanding  - Provide teaching via preferred learning methods  Outcome: Progressing     Problem: SAFETY,RESTRAINT: NV/NON-SELF DESTRUCTIVE BEHAVIOR  Goal: Remains free of harm/injury (restraint for non violent/non self-detsructive  behavior)  Description: INTERVENTIONS:  - Instruct patient/family regarding restraint use   - Assess and monitor physiologic and psychological status   - Provide interventions and comfort measures to meet assessed patient needs   - Identify and implement measures to help patient regain control  - Assess readiness for release of restraint   Outcome: Progressing  Goal: Returns to optimal restraint-free functioning  Description: INTERVENTIONS:  - Assess the patient's behavior and symptoms that indicate continued need for restraint  - Identify and implement measures to help patient regain control  - Assess readiness for release of restraint   Outcome: Progressing     Problem: NEUROSENSORY - ADULT  Goal: Achieves stable or improved neurological status  Description: INTERVENTIONS  - Monitor and report changes in neurological status  - Monitor vital signs such as temperature, blood pressure, glucose, and any other labs ordered   - Initiate measures to prevent increased intracranial pressure  - Monitor for seizure activity and implement precautions if appropriate      Outcome: Progressing     Problem: CARDIOVASCULAR - ADULT  Goal: Absence of cardiac dysrhythmias or at baseline rhythm  Description: INTERVENTIONS:  - Continuous cardiac monitoring, vital signs, obtain 12 lead EKG if ordered  - Administer antiarrhythmic and heart rate control medications as ordered  - Monitor electrolytes and administer replacement therapy as ordered  Outcome: Progressing     Problem: RESPIRATORY - ADULT  Goal: Achieves optimal ventilation and oxygenation  Description: INTERVENTIONS:  - Assess for changes in respiratory status  - Assess for changes in mentation and behavior  - Position to facilitate oxygenation and minimize respiratory effort  - Oxygen administered by appropriate delivery if ordered  - Initiate smoking cessation education as indicated  - Encourage broncho-pulmonary hygiene including cough, deep breathe, Incentive Spirometry  -  Assess the need for suctioning and aspirate as needed  - Assess and instruct to report SOB or any respiratory difficulty  - Respiratory Therapy support as indicated  Outcome: Progressing     Problem: GENITOURINARY - ADULT  Goal: Absence of urinary retention  Description: INTERVENTIONS:  - Assess patient’s ability to void and empty bladder  - Monitor I/O  - Bladder scan as needed  - Discuss with physician/AP medications to alleviate retention as needed  - Discuss catheterization for long term situations as appropriate  Outcome: Progressing     Problem: MUSCULOSKELETAL - ADULT  Goal: Maintain or return mobility to safest level of function  Description: INTERVENTIONS:  - Assess patient's ability to carry out ADLs; assess patient's baseline for ADL function and identify physical deficits which impact ability to perform ADLs (bathing, care of mouth/teeth, toileting, grooming, dressing, etc.)  - Assess/evaluate cause of self-care deficits   - Assess range of motion  - Assess patient's mobility  - Assess patient's need for assistive devices and provide as appropriate  - Encourage maximum independence but intervene and supervise when necessary  - Involve family in performance of ADLs  - Assess for home care needs following discharge   - Consider OT consult to assist with ADL evaluation and planning for discharge  - Provide patient education as appropriate  Outcome: Progressing     Problem: Prexisting or High Potential for Compromised Skin Integrity  Goal: Skin integrity is maintained or improved  Description: INTERVENTIONS:  - Identify patients at risk for skin breakdown  - Assess and monitor skin integrity  - Assess and monitor nutrition and hydration status  - Monitor labs   - Assess for incontinence   - Turn and reposition patient  - Assist with mobility/ambulation  - Relieve pressure over bony prominences  - Avoid friction and shearing  - Provide appropriate hygiene as needed including keeping skin clean and dry  -  Evaluate need for skin moisturizer/barrier cream  - Collaborate with interdisciplinary team   - Patient/family teaching  - Consider wound care consult   Outcome: Progressing     Problem: Nutrition/Hydration-ADULT  Goal: Nutrient/Hydration intake appropriate for improving, restoring or maintaining nutritional needs  Description: Monitor and assess patient's nutrition/hydration status for malnutrition. Collaborate with interdisciplinary team and initiate plan and interventions as ordered.  Monitor patient's weight and dietary intake as ordered or per policy. Utilize nutrition screening tool and intervene as necessary. Determine patient's food preferences and provide high-protein, high-caloric foods as appropriate.     INTERVENTIONS:  - Monitor oral intake, urinary output, labs, and treatment plans  - Assess nutrition and hydration status and recommend course of action  - Evaluate amount of meals eaten  - Assist patient with eating if necessary   - Allow adequate time for meals  - Recommend/ encourage appropriate diets, oral nutritional supplements, and vitamin/mineral supplements  - Order, calculate, and assess calorie counts as needed  - Recommend, monitor, and adjust tube feedings and TPN/PPN based on assessed needs  - Assess need for intravenous fluids  - Provide specific nutrition/hydration education as appropriate  - Include patient/family/caregiver in decisions related to nutrition  Outcome: Progressing

## 2025-03-06 NOTE — ASSESSMENT & PLAN NOTE
Impulse control disorder unspecified  Psych eval on 3/4/25, unable to complete interview with patient d/t lack of cooperation

## 2025-03-06 NOTE — ASSESSMENT & PLAN NOTE
"It is difficult to clearly eval patient's SI/ SA since nonverbal, noncooperative and behavioral issue.   However, per chart review- There was always a rising concern of actual SI/ suicidal attempt. In his last admission- there was a clear documentation \"Dmitry rebuttal was that the behavioral outbursts in which he removes the trach are rare and designed to draw attention when he is uncomfortable or sick.\"  Patient admitted he is very frustrated to his current situation and he is aware that he is at very high mortality risk after pulling out his Trach.  Pulling out trach/ PEG ---> ?Suicidal attempt   Would appreciate psych service revisit  Continue 1:1 close monitoring.  "

## 2025-03-07 ENCOUNTER — APPOINTMENT (INPATIENT)
Dept: RADIOLOGY | Facility: HOSPITAL | Age: 40
DRG: 760 | End: 2025-03-07
Payer: COMMERCIAL

## 2025-03-07 LAB
ANION GAP SERPL CALCULATED.3IONS-SCNC: 10 MMOL/L (ref 4–13)
BUN SERPL-MCNC: 12 MG/DL (ref 5–25)
CALCIUM SERPL-MCNC: 9.6 MG/DL (ref 8.4–10.2)
CHLORIDE SERPL-SCNC: 99 MMOL/L (ref 96–108)
CO2 SERPL-SCNC: 30 MMOL/L (ref 21–32)
CREAT SERPL-MCNC: 0.51 MG/DL (ref 0.6–1.3)
ERYTHROCYTE [DISTWIDTH] IN BLOOD BY AUTOMATED COUNT: 16.6 % (ref 11.6–15.1)
GFR SERPL CREATININE-BSD FRML MDRD: 137 ML/MIN/1.73SQ M
GLUCOSE SERPL-MCNC: 85 MG/DL (ref 65–140)
HCT VFR BLD AUTO: 31.1 % (ref 36.5–49.3)
HGB BLD-MCNC: 9.8 G/DL (ref 12–17)
MAGNESIUM SERPL-MCNC: 2.2 MG/DL (ref 1.9–2.7)
MCH RBC QN AUTO: 28.6 PG (ref 26.8–34.3)
MCHC RBC AUTO-ENTMCNC: 31.5 G/DL (ref 31.4–37.4)
MCV RBC AUTO: 91 FL (ref 82–98)
PLATELET # BLD AUTO: 225 THOUSANDS/UL (ref 149–390)
PMV BLD AUTO: 10.1 FL (ref 8.9–12.7)
POTASSIUM SERPL-SCNC: 4.5 MMOL/L (ref 3.5–5.3)
PROCALCITONIN SERPL-MCNC: 0.4 NG/ML
RBC # BLD AUTO: 3.43 MILLION/UL (ref 3.88–5.62)
SODIUM SERPL-SCNC: 139 MMOL/L (ref 135–147)
WBC # BLD AUTO: 13.16 THOUSAND/UL (ref 4.31–10.16)

## 2025-03-07 PROCEDURE — 85027 COMPLETE CBC AUTOMATED: CPT

## 2025-03-07 PROCEDURE — 49450 REPLACE G/C TUBE PERC: CPT | Performed by: RADIOLOGY

## 2025-03-07 PROCEDURE — 49450 REPLACE G/C TUBE PERC: CPT

## 2025-03-07 PROCEDURE — 94003 VENT MGMT INPAT SUBQ DAY: CPT

## 2025-03-07 PROCEDURE — 94640 AIRWAY INHALATION TREATMENT: CPT

## 2025-03-07 PROCEDURE — NC001 PR NO CHARGE: Performed by: NURSE PRACTITIONER

## 2025-03-07 PROCEDURE — 99291 CRITICAL CARE FIRST HOUR: CPT | Performed by: INTERNAL MEDICINE

## 2025-03-07 PROCEDURE — 94669 MECHANICAL CHEST WALL OSCILL: CPT

## 2025-03-07 PROCEDURE — 94150 VITAL CAPACITY TEST: CPT

## 2025-03-07 PROCEDURE — 94668 MNPJ CHEST WALL SBSQ: CPT

## 2025-03-07 PROCEDURE — 94760 N-INVAS EAR/PLS OXIMETRY 1: CPT

## 2025-03-07 PROCEDURE — 99255 IP/OBS CONSLTJ NEW/EST HI 80: CPT

## 2025-03-07 PROCEDURE — 84145 PROCALCITONIN (PCT): CPT

## 2025-03-07 PROCEDURE — 74018 RADEX ABDOMEN 1 VIEW: CPT

## 2025-03-07 PROCEDURE — 0D20XUZ CHANGE FEEDING DEVICE IN UPPER INTESTINAL TRACT, EXTERNAL APPROACH: ICD-10-PCS | Performed by: RADIOLOGY

## 2025-03-07 PROCEDURE — 83735 ASSAY OF MAGNESIUM: CPT

## 2025-03-07 PROCEDURE — 94664 DEMO&/EVAL PT USE INHALER: CPT

## 2025-03-07 PROCEDURE — 80048 BASIC METABOLIC PNL TOTAL CA: CPT

## 2025-03-07 RX ORDER — LORAZEPAM 2 MG/ML
INJECTION INTRAMUSCULAR
Status: DISPENSED
Start: 2025-03-07 | End: 2025-03-07

## 2025-03-07 RX ORDER — ACETAMINOPHEN 650 MG/20.3ML
650 SUSPENSION ORAL EVERY 4 HOURS PRN
Status: DISCONTINUED | OUTPATIENT
Start: 2025-03-07 | End: 2025-03-08 | Stop reason: HOSPADM

## 2025-03-07 RX ORDER — LORAZEPAM 2 MG/ML
1 INJECTION INTRAMUSCULAR ONCE
Status: COMPLETED | OUTPATIENT
Start: 2025-03-07 | End: 2025-03-07

## 2025-03-07 RX ORDER — OLANZAPINE 10 MG/2ML
10 INJECTION, POWDER, FOR SOLUTION INTRAMUSCULAR
Status: DISCONTINUED | OUTPATIENT
Start: 2025-03-07 | End: 2025-03-08 | Stop reason: HOSPADM

## 2025-03-07 RX ADMIN — CHLORHEXIDINE GLUCONATE 0.12% ORAL RINSE 15 ML: 1.2 LIQUID ORAL at 21:25

## 2025-03-07 RX ADMIN — CHLORHEXIDINE GLUCONATE 0.12% ORAL RINSE 15 ML: 1.2 LIQUID ORAL at 09:11

## 2025-03-07 RX ADMIN — ACETYLCYSTEINE 600 MG: 200 SOLUTION ORAL; RESPIRATORY (INHALATION) at 20:09

## 2025-03-07 RX ADMIN — ACETYLCYSTEINE 3 ML: 200 SOLUTION ORAL; RESPIRATORY (INHALATION) at 14:37

## 2025-03-07 RX ADMIN — LORAZEPAM 1 MG: 2 INJECTION INTRAMUSCULAR; INTRAVENOUS at 00:45

## 2025-03-07 RX ADMIN — IOHEXOL 10 ML: 350 INJECTION, SOLUTION INTRAVENOUS at 12:01

## 2025-03-07 RX ADMIN — ACETYLCYSTEINE 600 MG: 200 SOLUTION ORAL; RESPIRATORY (INHALATION) at 00:01

## 2025-03-07 RX ADMIN — QUETIAPINE FUMARATE 50 MG: 25 TABLET ORAL at 13:21

## 2025-03-07 RX ADMIN — LORAZEPAM 1 MG: 2 INJECTION INTRAMUSCULAR; INTRAVENOUS at 11:43

## 2025-03-07 RX ADMIN — OLANZAPINE 5 MG: 10 INJECTION, POWDER, FOR SOLUTION INTRAMUSCULAR at 09:54

## 2025-03-07 RX ADMIN — LORAZEPAM 1 MG: 2 INJECTION INTRAMUSCULAR at 00:45

## 2025-03-07 RX ADMIN — QUETIAPINE FUMARATE 200 MG: 100 TABLET ORAL at 21:15

## 2025-03-07 RX ADMIN — OLANZAPINE 5 MG: 10 INJECTION, POWDER, FOR SOLUTION INTRAMUSCULAR at 03:08

## 2025-03-07 RX ADMIN — ESCITALOPRAM OXALATE 10 MG: 10 TABLET ORAL at 13:20

## 2025-03-07 RX ADMIN — IPRATROPIUM BROMIDE AND ALBUTEROL SULFATE 3 ML: .5; 3 SOLUTION RESPIRATORY (INHALATION) at 00:01

## 2025-03-07 RX ADMIN — APIXABAN 5 MG: 5 TABLET, FILM COATED ORAL at 21:14

## 2025-03-07 RX ADMIN — IPRATROPIUM BROMIDE AND ALBUTEROL SULFATE 3 ML: .5; 3 SOLUTION RESPIRATORY (INHALATION) at 07:56

## 2025-03-07 RX ADMIN — IPRATROPIUM BROMIDE AND ALBUTEROL SULFATE 3 ML: .5; 3 SOLUTION RESPIRATORY (INHALATION) at 20:09

## 2025-03-07 RX ADMIN — APIXABAN 5 MG: 5 TABLET, FILM COATED ORAL at 13:20

## 2025-03-07 RX ADMIN — IPRATROPIUM BROMIDE AND ALBUTEROL SULFATE 3 ML: .5; 3 SOLUTION RESPIRATORY (INHALATION) at 14:37

## 2025-03-07 RX ADMIN — ACETYLCYSTEINE 3 ML: 200 SOLUTION ORAL; RESPIRATORY (INHALATION) at 07:56

## 2025-03-07 NOTE — RESPIRATORY THERAPY NOTE
03/07/25 0759   Daily Screen   Patient safety screen outcome: Failed   Not Ready for Weaning due to: Underline problem not resolved   Spont breathing trial outcome:   (N/A)   IHI Ventilator Associated Pneumonia Bundle   Daily Awakening Trials Performed Yes   Daily Assessment of Readiness to Extubate Yes   Head of Bed Elevated HOB 30   Oral Care Suction swab     RT Ventilator Management Note  Hemanth Swanson 39 y.o. male MRN: [id number]  Unit/Bed#: ICU 03 Encounter: [id number]      Daily Screen         3/6/2025  0728 3/7/2025  0759          Patient safety screen outcome:: Failed Failed      Not Ready for Weaning due to:: Underline problem not resolved Underline problem not resolved      Spont breathing trial outcome:: -- --                Physical Exam:      Pt is vent dependent home pt

## 2025-03-07 NOTE — ASSESSMENT & PLAN NOTE
Patient and family wish to stay in the home  Family very capable of caring for patient.  Appreciate palliative care consult- appears that patient and family/ POA refused LTAC. They would like to be discharged home.

## 2025-03-07 NOTE — ASSESSMENT & PLAN NOTE
Recently replaced 2/13 after self removal  3/6: G-tube clotted- pending IR consult for replacement.  Continue tube feedings at goal after replacement.

## 2025-03-07 NOTE — CASE MANAGEMENT
Case Management Discharge Planning Note    Patient name Hemanth Swanson  Location ICU 03/ICU 03 MRN [id number]  : 1985 Date 3/7/2025       Current Admission Date: 3/4/2025  Current Admission Diagnosis:Chronic respiratory failure with hypoxia and hypercapnia (HCC)   Patient Active Problem List    Diagnosis Date Noted Date Diagnosed    Palliative care patient 2025     ? 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     Impaired mobility 2023     Status post tracheostomy (HCC) 2023     S/P percutaneous endoscopic gastrostomy (PEG) tube placement (HCC) 2023     Anxiety 2023     Chronic respiratory failure with hypoxia and hypercapnia (HCC) 2023     Depression 2023     History of LVH (left ventricular hypertrophy) 2023     Pure hypertriglyceridemia 2023     Unilateral vestibular schwannoma (HCC) 2023     Port-A-Cath in place 2023     Diplopia 2023     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HCC) 2023     Umbilical hernia without obstruction and without gangrene 12/10/2022     Tobacco use 12/10/2022     Retroperitoneal lymphadenopathy 12/10/2022       LOS (days): 3  Geometric Mean LOS (GMLOS) (days): 2.3  Days to GMLOS:-0.9     OBJECTIVE:  Risk of Unplanned Readmission Score: 41.42         Current admission status: Inpatient   Preferred Pharmacy:   [id number]  [address] [place] [address]  [place] [address]  Phone: [phone] Fax: [phone]    Primary Care Provider: Ela Douglas MD    Primary Insurance: [facility]  Secondary Insurance:     DISCHARGE DETAILS:      Other Referral/Resources/Interventions Provided:  Referral Comments: Referral for HHC and extended the response timeline       Treatment Team Recommendation: SNF  Discharge Destination Plan:: Home with Home Health Care        Additional Comments: Patient is for possible transfer to another facility for diagnostics, ICU providers recommended LTC which family declined. Referral previously placed for HHC with no available facility. CM extended the search radius and response time. CM will continue to follow for discharge planning.

## 2025-03-07 NOTE — ASSESSMENT & PLAN NOTE
Chronic resp failure on chronic vent  - Overall mgmt and sedation per [facility]  - Chronic vent settings 18/500/8/40%; 5.0 XLT Shiley  - Monitor vent values, other vitals, labs, lung exam, overall exam, secretions closely  - Suction PRN when indicated  - Low threshold for repeat CXR  - Monitor for ventilator associated complications  - Hx of mucous plugging, intentional breath holding, agitation, pulling PEG tube out, and bradycardiac episodes  - CXR 3/5 Small to moderate left greater than right layering pleural effusions and bibasilar consolidations. These findings are suboptimally assessed due to low lung volumes but appear grossly unchanged no visible pneumothorax.     - uncertain etiology c/ hx of mixed axon-demyelinating neuropathy but does have significant cord pathology that starts at cervical medullary junction into upper T spine that could explain it but the cause of this not clear  - In past, neuro felt etiology of weakness and resp failure unclear and demyelinating d/s could not be ruled out nor could not rule out CIDP and he was administered 5 day course of IVIG 10/2024  - They also felt this could be non-acute TM or ischemia (from hypoxia) to cord in context of MRI brain that also showed mildly restricted diffusion in the splenium of the corpus callosum and more subtly in the anterior cingulate gyri. Possible sequela of hypoxic ischemic injury, inflammatory or infectious process.  - hx of metastatic left testicular seminoma, status post left orchiectomy  - Paraneoplastic panels negative in Oct including CSF; CT C/A/P 12/16/24 - no signs of malignancy   - EMG 9/2023 showed generalized diffuse demyelinating and axonal sensory>motor polyneuropathy   - EMG 7/2025  showed upper extremity axonal sensorimotor neuropathy, though sensory responses in the lower extremities were normal with incidental findings the presence of chronic C5-6 radiculopathy in the right upper extremity, without ongoing denervation with  evidence at that time to support diffuse neurogenic process such as motor neuron d/s or myopathic process  - TTE 10/15 normal EF, diastolic function, R ventricle moderately dilated and new compared with 6/2024 study  - Chronic vent settings 18/500/8/40%; 5.0 XLT Jie

## 2025-03-07 NOTE — NURSING NOTE
Pt constantly trying to pull at tubes, hitting side rails, bending his head down and trying tp pull against soft wrist restraint to pull at trache side, then tries to pull off purwick,, sticks his left middle finger at staff repeatedly, had pulled off pulse ox off his toe, tries to kick at staff when reapplying pulse ox, while trying to tighten restraints digs nails into this RN finger

## 2025-03-07 NOTE — QUICK NOTE
Called [facility][1] neurology ([phone]) trying to get hold of a neuromuscular disorder specialist.  kindly sent a msg to Dr. Bhupinder Levin.     This ICU's contact number and my personal number were provided for callback.      ----------------------------------------------------------    Spoke with patient's POA/Sister Dmitry updating hospital course.  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  She feel comfortable to give IM medication if needed at home.

## 2025-03-07 NOTE — ASSESSMENT & PLAN NOTE
Has pulled it out on multiple occasions in past  Monitor site and abdominal exam closely  Clogged 3/6 s/p IR PEG gastrostomy replacement 3/7  Adjust meds as able until cleared to used tube again

## 2025-03-07 NOTE — PROGRESS NOTES
Progress Note - Critical Care/ICU   Name: Hemanth Swanson 39 y.o. male I MRN: [id number]  Unit/Bed#: ICU 03 I Date of Admission: 3/4/2025   Date of Service: 3/7/2025 I Hospital Day: 3      Assessment & Plan  Chronic respiratory failure with hypoxia and hypercapnia (HCC)  S/p trach/ vent dependent for neuromuscular disease  Of note- Chronic settings of 18/500/8/40%  5.0 XLT Shiley trach.  Known history of mucous plugging & intentional holding breath    Plan:  Continue full vent support  Pulmonary toilet; continue home duo-nebs and 3% nebs q6h and chest PT TID   Maintain O2 sat 90% or above   VAP preventions; chlorhexidine, PPI, HOB greater than 30 degrees  With suspicion of bilateral diaphragm/cranial nerve paralysis-patient could be a candidate for ?diaphragm pacer.  Patient and family are interested with second option in [facility]  Agitation  Of note, this is a recurrent issue. Patient frequently tried to pull out trach/PEG during combative/agitated episodes.  There was initial concern regarding SI versus suicidal attempt per last admission notes patient was cleared by psych.  Patient received total 9 mg of Versed and was started on Precedex drip for his agitation.  Upon arrival [facility] ICU-patient was easily agitated/ uncooperative.    Plan:  Appreciate psych recs  Currently on Seroquel 50mg BID with 200mg QHS  Replace Ativan with Zyprexa 5 mg -10 mg every 6 hours as needed for agitation/impulsivity  Lexapro 10 mg daily.  Restraint as needed.  One-to-one  QTc within normal.  Monitor on Tele  Routinely check EKG  Mood disorder (HCC)  Seen above.  Ventilator dependent (HCC)  Patient and family wish to stay in the home  Family very capable of caring for patient.  Appreciate palliative care consult- appears that patient and family/ POA refused LTAC. They would like to be discharged home.  History of pulmonary embolus (PE)  Continue home Eliquis  Mixed axonal-demyelinating polyneuropathy  Follows with neurology- unclear  "etiology.  Currently in the process to establish care with [facility]  Supportive care  Status post tracheostomy (HCC)  Noted history. Vent dependent  S/P percutaneous endoscopic gastrostomy (PEG) tube placement (HCC)  Recently replaced 2/13 after self removal  3/6: G-tube clotted- pending IR consult for replacement.  Continue tube feedings at goal after replacement.  Palliative care patient    ? Suicidal risk  It is difficult to clearly eval patient's SI/ SA since nonverbal, noncooperative and behavioral issue.   However, per chart review- There was always a rising concern of actual SI/ suicidal attempt. In his last admission- there was a clear documentation \"Dmitry rebuttal was that the behavioral outbursts in which he removes the trach are rare and designed to draw attention when he is uncomfortable or sick.\"  Patient admitted he is very frustrated to his current situation and he is aware that he is at very high mortality risk after pulling out his Trach.  Pulling out trach/ PEG ---> ?Suicidal attempt   Would appreciate psych service revisit  Patient denied any suicidal ideation. However, he is not able to indicate the reasoning for the behavior trying to remove trach/PEG.  Continue 1:1 close monitoring.  Disposition: Critical care    ICU Core Measures     Vented Patient  VAP Bundle  VAP bundle ordered     A: Assess, Prevent, and Manage Pain Has pain been assessed? NA  Need for changes to pain regimen? NA   B: Both Spontaneous Awakening Trials (SATs) and Spontaneous Breathing Trials (SBTs) Plan to perform spontaneous awakening trial today? Chronic vent   Plan to perform spontaneous breathing trial today? Chronic vent   Obvious barriers to extubation? NA   C: Choice of Sedation RASS Goal: 0 Alert and Calm  Need for changes to sedation or analgesia regimen? NA   D: Delirium CAM-ICU: Unable to perform secondary to Dementia or other chronic cognitive dysfunction   E: Early Mobility  Plan for early mobility? Yes   F: Family " Engagement Plan for family engagement today? Yes       Review of Invasive Devices:      Central access plan:  chronic port      Prophylaxis:  VTE VTE covered by:  apixaban, Per PEG Tube, 5 mg at 03/06/25 0812       Stress Ulcer  not ordered         24 Hour Events :     Reconsult psychiatry-replaced Ativan to Zyprexa 5 mg every 6 hours as needed for agitation and impulsivity.  Started Lexapro 10 mg daily for depression.     Patient denied suicidal ideation to psychiatrist and palliative care provider.    PEG tube clotted IR consult.    2 doses of as needed Zyprexa were given.  Requiring 1 more dose of 1 mg IV Ativan overnight.    Episode of desatting-with high peak pressure and very low tidal volume: Low suspicion for seizing since  there is no interim confusion. Suspecting Behavior since resolved while sleeping.    Subjective   Review of Systems: Review of Systems not obtainable due to Uncooperative patient    Objective :                   Vitals I/O      Most Recent Min/Max in 24hrs   Temp 98.3 °F (36.8 °C) Temp  Min: 98.3 °F (36.8 °C)  Max: 101.2 °F (38.4 °C)   Pulse 82 Pulse  Min: 67  Max: 128   Resp (!) 36 Resp  Min: 15  Max: 36   /67 BP  Min: 90/62  Max: 132/76   O2 Sat 94 % SpO2  Min: 87 %  Max: 99 %      Intake/Output Summary (Last 24 hours) at 3/7/2025 0726  Last data filed at 3/7/2025 0601  Gross per 24 hour   Intake 453 ml   Output 900 ml   Net -447 ml       Diet Enteral/Parenteral; Tube Feeding No Oral Diet; Jevity 1.5; Continuous; 65; 100; Water; Every 4 hours    Invasive Monitoring           Physical Exam   Physical Exam  Vitals and nursing note reviewed.   Eyes:      General:         Right eye: No discharge.         Left eye: No discharge.      Comments: Eyes open   Skin:     General: Skin is warm.   HENT:      Mouth/Throat:      Mouth: Mucous membranes are moist.   Neck:      Trachea: Tracheostomy present.   Cardiovascular:      Rate and Rhythm: Normal rate and regular rhythm.      Pulses:  Normal pulses.   Musculoskeletal:      Right lower leg: No edema.      Left lower leg: No edema.      Comments: Left upper extremity soft restraint  Right lower extremity soft restraint   Abdominal: General: Bowel sounds are normal.      Palpations: Abdomen is soft.   Constitutional:       General: He is not in acute distress.  Pulmonary:      Comments: On vent  Neurological:      Mental Status: He is alert. He is calm.      Comments: Uncooperative    Genitourinary/Anorectal:  external catheter present.        Diagnostic Studies        Lab Results: I have reviewed the following results:     Medications:  Scheduled PRN   acetylcysteine, 3 mL, Q6H  apixaban, 5 mg, BID  chlorhexidine, 15 mL, Q12H ISAEL  escitalopram, 10 mg, Daily  ipratropium-albuterol, 3 mL, Q6H  QUEtiapine, 200 mg, HS  QUEtiapine, 50 mg, BID      acetaminophen, 1,000 mg, Q6H PRN  OLANZapine, 5 mg, Q6H PRN  polyethylene glycol, 17 g, Daily PRN       Continuous          Labs:   CBC    Recent Labs     03/07/25  0424   WBC 13.16*   HGB 9.8*   HCT 31.1*        BMP    Recent Labs     03/07/25  0500   SODIUM 139   K 4.5   CL 99   CO2 30   AGAP 10   BUN 12   CREATININE 0.51*   CALCIUM 9.6       Coags    No recent results     Additional Electrolytes  Recent Labs     03/07/25  0501   MG 2.2          Blood Gas    No recent results  No recent results LFTs  No recent results    Infectious  Recent Labs     03/07/25  0526   PROCALCITONI 0.40*     Glucose  Recent Labs     03/07/25  0500   GLUC 85

## 2025-03-07 NOTE — CONSULTS
e-Consult (IPC)  - Interventional Radiology  Hemanth Swanson 39 y.o. male MRN: [id number]  Unit/Bed#: ICU 03 Encounter: [id number]          Interventional Radiology has been consulted to evaluate Hemanth Swanson    We were consulted by Critical Care concerning this patient with acute agitation, clogged gastrostomy tube with concern for dislodgement.    Inpatient Consult to IR  Consult performed by: AMBER Qureshi  Consult ordered by: AMBER Mcelroy        03/07/25    Assessment/Recommendation:   38 yo male non-verbal with metastatic testicular cancer, PE on Eliquis, neuromuscular dysfunction, chronic VDRF with trach and G-tube in place who was admitted with acute episodes of agitation/combativeness, attempting to pull out trach and g-tube.     Per chart review, patient prior episodes of dislodgement with last gastrostomy tube replacement with 16F on 2/13/25.    -Plan for IR gastrostomy today at bedside.   -remainder of care per Primary team      31 + minutes, >50% of the total time devoted to medical consultative verbal/EMR discussion between providers. Written report will be generated in the EMR.     Thank you for allowing Interventional Radiology to participate in the care of Hemanth Swanson. Please don't hesitate to call or Message us with any questions.     AMBER Qureshi

## 2025-03-07 NOTE — BRIEF OP NOTE (RAD/CATH)
INTERVENTIONAL RADIOLOGY PROCEDURE NOTE    Date: 3/7/2025    Procedure:      Preoperative diagnosis:   1. Mood disorder (HCC)    2. Agitation    3. S/P percutaneous endoscopic gastrostomy (PEG) tube placement (HCC)    4. Status post tracheostomy (HCC)    5. Chronic respiratory failure with hypoxia and hypercapnia (HCC)    6. 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HCC)    7. Ventilator dependent (HCC)    8. Suicidal risk         Postoperative diagnosis: Same.    Surgeon: Bhupinder Sotomayor MD     Assistant: None. No qualified resident was available.    Blood loss: None    Specimens: None    Findings: 16 [place] gastrostomy exchange bedside.  Abdominal film pending to confirm proper position.    Complications: None immediate.    Anesthesia: none

## 2025-03-07 NOTE — ASSESSMENT & PLAN NOTE
"It is difficult to clearly eval patient's SI/ SA since nonverbal, noncooperative and behavioral issue.   However, per chart review- There was always a rising concern of actual SI/ suicidal attempt. In his last admission- there was a clear documentation \"Dmitry rebuttal was that the behavioral outbursts in which he removes the trach are rare and designed to draw attention when he is uncomfortable or sick.\"  Patient admitted he is very frustrated to his current situation and he is aware that he is at very high mortality risk after pulling out his Trach.  Pulling out trach/ PEG ---> ?Suicidal attempt   Would appreciate psych service revisit  Patient denied any suicidal ideation. However, he is not able to indicate the reasoning for the behavior trying to remove trach/PEG.  Continue 1:1 close monitoring.  "

## 2025-03-07 NOTE — ASSESSMENT & PLAN NOTE
History of bipolar, depression, and agitation with possible suicide attempt   - Overall mgmt per  and CCM  - Seroquel 50mg 9am, 50mg 6pm, 200mg 2200 (increased recently)   - Zyprexa 5mg IM Q6H PRN   - Lexapro 10mg qday (new)   - Hopefully recent medication adjustments will help mood/agitation and removal of peg tube at home  - (PRN ativan as well)  - Monitor for signs of uncontrolled agitation/SI  - Monitor and optimize sleep-wake cycle; if able recommend sleep-wake log and agitation behavior scale   - 1:1, tried to avoid restraints that can increase agitation and risk of skin wounds    - Supportive counseling  - Counseled on relaxation/behavioral management techniques

## 2025-03-07 NOTE — ASSESSMENT & PLAN NOTE
Follows with neurology- unclear etiology.  Currently in the process to establish care with [facility]

## 2025-03-07 NOTE — ASSESSMENT & PLAN NOTE
S/p trach/ vent dependent for neuromuscular disease  Of note- Chronic settings of 18/500/8/40%  5.0 XLT Shiley trach.  Known history of mucous plugging & intentional holding breath    Plan:  Continue full vent support  Pulmonary toilet; continue home duo-nebs and 3% nebs q6h and chest PT TID   Maintain O2 sat 90% or above   VAP preventions; chlorhexidine, PPI, HOB greater than 30 degrees  With suspicion of bilateral diaphragm/cranial nerve paralysis-patient could be a candidate for ?diaphragm pacer.  Patient and family are interested with second option in [facility]

## 2025-03-07 NOTE — PLAN OF CARE
Problem: PAIN - ADULT  Goal: Verbalizes/displays adequate comfort level or baseline comfort level  Description: Interventions:  - Encourage patient to monitor pain and request assistance  - Assess pain using appropriate pain scale  - Administer analgesics based on type and severity of pain and evaluate response  - Implement non-pharmacological measures as appropriate and evaluate response  - Consider cultural and social influences on pain and pain management  - Notify physician/advanced practitioner if interventions unsuccessful or patient reports new pain  Outcome: Progressing     Problem: SAFETY ADULT  Goal: Patient will remain free of falls  Description: INTERVENTIONS:  - Educate patient/family on patient safety including physical limitations  - Instruct patient to call for assistance with activity   - Consult OT/PT to assist with strengthening/mobility   - Keep Call bell within reach  - Keep bed low and locked with side rails adjusted as appropriate  - Keep care items and personal belongings within reach  - Initiate and maintain comfort rounds  - Make Fall Risk Sign visible to staff  - Offer Toileting every 2 Hours, in advance of need  - Initiate/Maintain bed alarm  - Obtain necessary fall risk management equipment:   - Apply yellow socks and bracelet for high fall risk patients  - Consider moving patient to room near nurses station  Outcome: Progressing  Goal: Maintain or return to baseline ADL function  Description: INTERVENTIONS:  -  Assess patient's ability to carry out ADLs; assess patient's baseline for ADL function and identify physical deficits which impact ability to perform ADLs (bathing, care of mouth/teeth, toileting, grooming, dressing, etc.)  - Assess/evaluate cause of self-care deficits   - Assess range of motion  - Assess patient's mobility; develop plan if impaired  - Assess patient's need for assistive devices and provide as appropriate  - Encourage maximum independence but intervene and  supervise when necessary  - Involve family in performance of ADLs  - Assess for home care needs following discharge   - Consider OT consult to assist with ADL evaluation and planning for discharge  - Provide patient education as appropriate  Outcome: Progressing  Goal: Maintains/Returns to pre admission functional level  Description: INTERVENTIONS:  - Perform AM-PAC 6 Click Basic Mobility/ Daily Activity assessment daily.  - Set and communicate daily mobility goal to care team and patient/family/caregiver.   - Collaborate with rehabilitation services on mobility goals if consulted  - Perform Range of Motion 4 times a day.  - Reposition patient every 2 hours.    - Out of bed for toileting  - Record patient progress and toleration of activity level   Outcome: Progressing     Problem: DISCHARGE PLANNING  Goal: Discharge to home or other facility with appropriate resources  Description: INTERVENTIONS:  - Identify barriers to discharge w/patient and caregiver  - Arrange for needed discharge resources and transportation as appropriate  - Identify discharge learning needs (meds, wound care, etc.)  - Arrange for interpretive services to assist at discharge as needed  - Refer to Case Management Department for coordinating discharge planning if the patient needs post-hospital services based on physician/advanced practitioner order or complex needs related to functional status, cognitive ability, or social support system  Outcome: Progressing     Problem: Knowledge Deficit  Goal: Patient/family/caregiver demonstrates understanding of disease process, treatment plan, medications, and discharge instructions  Description: Complete learning assessment and assess knowledge base.  Interventions:  - Provide teaching at level of understanding  - Provide teaching via preferred learning methods  Outcome: Progressing     Problem: SAFETY,RESTRAINT: NV/NON-SELF DESTRUCTIVE BEHAVIOR  Goal: Remains free of harm/injury (restraint for non  violent/non self-detsructive behavior)  Description: INTERVENTIONS:  - Instruct patient/family regarding restraint use   - Assess and monitor physiologic and psychological status   - Provide interventions and comfort measures to meet assessed patient needs   - Identify and implement measures to help patient regain control  - Assess readiness for release of restraint   Outcome: Progressing  Goal: Returns to optimal restraint-free functioning  Description: INTERVENTIONS:  - Assess the patient's behavior and symptoms that indicate continued need for restraint  - Identify and implement measures to help patient regain control  - Assess readiness for release of restraint   Outcome: Progressing     Problem: NEUROSENSORY - ADULT  Goal: Achieves stable or improved neurological status  Description: INTERVENTIONS  - Monitor and report changes in neurological status  - Monitor vital signs such as temperature, blood pressure, glucose, and any other labs ordered   - Initiate measures to prevent increased intracranial pressure  - Monitor for seizure activity and implement precautions if appropriate      Outcome: Progressing     Problem: CARDIOVASCULAR - ADULT  Goal: Absence of cardiac dysrhythmias or at baseline rhythm  Description: INTERVENTIONS:  - Continuous cardiac monitoring, vital signs, obtain 12 lead EKG if ordered  - Administer antiarrhythmic and heart rate control medications as ordered  - Monitor electrolytes and administer replacement therapy as ordered  Outcome: Progressing     Problem: RESPIRATORY - ADULT  Goal: Achieves optimal ventilation and oxygenation  Description: INTERVENTIONS:  - Assess for changes in respiratory status  - Assess for changes in mentation and behavior  - Position to facilitate oxygenation and minimize respiratory effort  - Oxygen administered by appropriate delivery if ordered  - Initiate smoking cessation education as indicated  - Encourage broncho-pulmonary hygiene including cough, deep  breathe, Incentive Spirometry  - Assess the need for suctioning and aspirate as needed  - Assess and instruct to report SOB or any respiratory difficulty  - Respiratory Therapy support as indicated  Outcome: Progressing     Problem: GENITOURINARY - ADULT  Goal: Absence of urinary retention  Description: INTERVENTIONS:  - Assess patient’s ability to void and empty bladder  - Monitor I/O  - Bladder scan as needed  - Discuss with physician/AP medications to alleviate retention as needed  - Discuss catheterization for long term situations as appropriate  Outcome: Progressing     Problem: MUSCULOSKELETAL - ADULT  Goal: Maintain or return mobility to safest level of function  Description: INTERVENTIONS:  - Assess patient's ability to carry out ADLs; assess patient's baseline for ADL function and identify physical deficits which impact ability to perform ADLs (bathing, care of mouth/teeth, toileting, grooming, dressing, etc.)  - Assess/evaluate cause of self-care deficits   - Assess range of motion  - Assess patient's mobility  - Assess patient's need for assistive devices and provide as appropriate  - Encourage maximum independence but intervene and supervise when necessary  - Involve family in performance of ADLs  - Assess for home care needs following discharge   - Consider OT consult to assist with ADL evaluation and planning for discharge  - Provide patient education as appropriate  Outcome: Progressing     Problem: Prexisting or High Potential for Compromised Skin Integrity  Goal: Skin integrity is maintained or improved  Description: INTERVENTIONS:  - Identify patients at risk for skin breakdown  - Assess and monitor skin integrity  - Assess and monitor nutrition and hydration status  - Monitor labs   - Assess for incontinence   - Turn and reposition patient  - Assist with mobility/ambulation  - Relieve pressure over bony prominences  - Avoid friction and shearing  - Provide appropriate hygiene as needed including  keeping skin clean and dry  - Evaluate need for skin moisturizer/barrier cream  - Collaborate with interdisciplinary team   - Patient/family teaching  - Consider wound care consult   Outcome: Progressing     Problem: Nutrition/Hydration-ADULT  Goal: Nutrient/Hydration intake appropriate for improving, restoring or maintaining nutritional needs  Description: Monitor and assess patient's nutrition/hydration status for malnutrition. Collaborate with interdisciplinary team and initiate plan and interventions as ordered.  Monitor patient's weight and dietary intake as ordered or per policy. Utilize nutrition screening tool and intervene as necessary. Determine patient's food preferences and provide high-protein, high-caloric foods as appropriate.     INTERVENTIONS:  - Monitor oral intake, urinary output, labs, and treatment plans  - Assess nutrition and hydration status and recommend course of action  - Evaluate amount of meals eaten  - Assist patient with eating if necessary   - Allow adequate time for meals  - Recommend/ encourage appropriate diets, oral nutritional supplements, and vitamin/mineral supplements  - Order, calculate, and assess calorie counts as needed  - Recommend, monitor, and adjust tube feedings and TPN/PPN based on assessed needs  - Assess need for intravenous fluids  - Provide specific nutrition/hydration education as appropriate  - Include patient/family/caregiver in decisions related to nutrition  Outcome: Progressing     Problem: COPING  Goal: Pt/Family able to verbalize concerns and demonstrate effective coping strategies  Description: INTERVENTIONS:  - Assist patient/family to identify coping skills, available support systems and cultural and spiritual values  - Provide emotional support, including active listening and acknowledgement of concerns of patient and caregivers  - Reduce environmental stimuli, as able  - Provide patient education  - Assess for spiritual pain/suffering and initiate  spiritual care, including notification of Pastoral Care or melody based community as needed  - Assess effectiveness of coping strategies  Outcome: Progressing  Goal: Will report anxiety at manageable levels  Description: INTERVENTIONS:  - Administer medication as ordered  - Teach and encourage coping skills  - Provide emotional support  - Assess patient/family for anxiety and ability to cope  Outcome: Progressing

## 2025-03-07 NOTE — CONSULTS
CONSULTATION - PMR   Name: Hemanth Swanson 39 y.o. male I MRN: [id number]  Unit/Bed#: ICU 03 I Date of Admission: 3/4/2025   Date of Service: 3/7/2025 I Hospital Day: 3     Referred by:  Jess Esteban MD  For:  Ventilator dependent   Assessment & Plan  Chronic respiratory failure with hypoxia and hypercapnia (HCC)  Chronic resp failure on chronic vent  - Overall mgmt and sedation per [facility]  - Chronic vent settings 18/500/8/40%; 5.0 XLT Shiley  - Monitor vent values, other vitals, labs, lung exam, overall exam, secretions closely  - Suction PRN when indicated  - Low threshold for repeat CXR  - Monitor for ventilator associated complications  - Hx of mucous plugging, intentional breath holding, agitation, pulling PEG tube out, and bradycardiac episodes  - CXR 3/5 Small to moderate left greater than right layering pleural effusions and bibasilar consolidations. These findings are suboptimally assessed due to low lung volumes but appear grossly unchanged no visible pneumothorax.     - uncertain etiology c/ hx of mixed axon-demyelinating neuropathy but does have significant cord pathology that starts at cervical medullary junction into upper T spine that could explain it but the cause of this not clear  - In past, neuro felt etiology of weakness and resp failure unclear and demyelinating d/s could not be ruled out nor could not rule out CIDP and he was administered 5 day course of IVIG 10/2024  - They also felt this could be non-acute TM or ischemia (from hypoxia) to cord in context of MRI brain that also showed mildly restricted diffusion in the splenium of the corpus callosum and more subtly in the anterior cingulate gyri. Possible sequela of hypoxic ischemic injury, inflammatory or infectious process.  - hx of metastatic left testicular seminoma, status post left orchiectomy  - Paraneoplastic panels negative in Oct including CSF; CT C/A/P 12/16/24 - no signs of malignancy   - EMG 9/2023 showed generalized diffuse  demyelinating and axonal sensory>motor polyneuropathy   - EMG 7/2025  showed upper extremity axonal sensorimotor neuropathy, though sensory responses in the lower extremities were normal with incidental findings the presence of chronic C5-6 radiculopathy in the right upper extremity, without ongoing denervation with evidence at that time to support diffuse neurogenic process such as motor neuron d/s or myopathic process  - TTE 10/15 normal EF, diastolic function, R ventricle moderately dilated and new compared with 6/2024 study  - Chronic vent settings 18/500/8/40%; 5.0 XLT [place]  Mixed axonal-demyelinating polyneuropathy  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   - EMG 7/2025  showed upper extremity axonal sensorimotor neuropathy, though sensory responses in the lower extremities were normal with incidental findings the presence of chronic C5-6 radiculopathy in the right upper extremity, without ongoing denervation with evidence at that time to support diffuse neurogenic process such as motor neuron d/s or myopathic process  - In past, neuro felt etiology of weakness and resp failure unclear and demyelinating d/s could not be ruled out nor could not rule out CIDP and he was administered 5 day course of IVIG 10/2024  - Recommend PT, OT in acute setting to improve bed mobility, upright tolerances, ADLs, strength, conditioning, and decrease risks of immobility  - Decrease risks of complications related to decreased mobility status and monitor for them during course  - MSK - atrophy, contractures, bone demineralization, CV - DVT/PE, orthostasis, decreased CO, Resp - atelectasis, PNA, GI - constipation, reduced appetite,  - retention, incontinence, Skin - pressure injuries  - Optimal bowel/bladder mgmt/hygiene - monitor for retention, infection     - Turn patient Q2H, skin checks minimum Qshift  - ROM of major joints 2-3  times per day and with PT/OT   - OP follow-up with neuro with repeat MRI brain and MRI spine and EMG/NCS unless needed sooner  - Recommend Kreg catalyst bed with verticalization feature to improve sitting and upright tolerances   - Referral to [facility] for additional evaluation reasonable if pt/family open to this     Prior Level of Function and Social history:    Patient lives in 1st floor set-up with ramp entrance in [facility] with family - sister Dmitry and Aunt Magaly who provide caregiving.  - Early 2024, he could get to commode but not for months preceding Oct hospitalization.  At that time he was able to move arms and legs but was weak throughout. He was able to type on computer and phone and follow simple commands and voice words. Patient was eating and drinking orally and they would not provide PEG tube feeds if eating or drinking adequately. Patient would not drink plain water and drank mostly flavored drinks and sometimes soda. He could help some with dressing. He is chronically incontinent of b/b but would notifiy CG's/family when he went. Overall though was dependent with ADLs and mobility; WC, Wallace lift, Electric recliner;     Current Level of Function:    Dependent ADLs and mobility     Disposition recommendation:  Home once stabilized with 24-7 caregiver able to handle medical needs v SNF able to manage vent and patient adequately     S/P percutaneous endoscopic gastrostomy (PEG) tube placement (HCC)  Has pulled it out on multiple occasions in past  Monitor site and abdominal exam closely  Clogged 3/6 s/p IR PEG gastrostomy replacement 3/7  Adjust meds as able until cleared to used tube again   Mood disorder (HCC)  History of bipolar, depression, and agitation with possible suicide attempt   - Overall mgmt per  and CCM  - Seroquel 50mg 9am, 50mg 6pm, 200mg 2200 (increased recently)   - Zyprexa 5mg IM Q6H PRN   - Lexapro 10mg qday (new)   - Hopefully recent medication adjustments will help mood/agitation and  "removal of peg tube at home  - (PRN ativan as well)  - Monitor for signs of uncontrolled agitation/SI  - Monitor and optimize sleep-wake cycle; if able recommend sleep-wake log and agitation behavior scale   - 1:1, tried to avoid restraints that can increase agitation and risk of skin wounds    - Supportive counseling  - Counseled on relaxation/behavioral management techniques    Agitation  See mood d/o   Status post tracheostomy (HCC)    Ventilator dependent (HCC)    History of pulmonary embolus (PE)  Eliquis  Palliative care patient    ? Suicidal risk      Encounter for skin care  - Increased risk of skin wounds/breakdown/rashes due to recent immobility and co-morbidities   - Turn patient Q2H in bed (use pillows or wedges), encourage wt shifts every 10-15 minutes in chair  - Patient and if available caregiver education   - Hydragaurd (or other appropriate barrier cream) to buttocks and sacrum BID & PRN   - Allevyn foam to heels and/or float heels when in bed   - Optimal bowel/bladder hygiene; keep skin clean and dry   - EHOB waffle cushion to chair/WC when OOB  - Rec nursing to document in chart and Notify MD if buttock, sacrum, heel, or other skin site develops erythema, skin breakdown, or rashes as soon as possible.  If patient is soiling themselves with urine or stool notify MD.  If you are unable to maintain skin integrity and prevent erythema due to frequency of soiling notify MD as soon as possible as well  - Consult would care for any wounds or significant concerns for skin integrity     VTE prophylaxis   As outlined by primary team      Other medical issues:     As per primary service (and relevant consultants if applicable)    ==================================================================    Chief Complaint:  Resp failure     History of Present Illness:   Per my prior encounter last seen 10/2024  \"39-year-old male with a past medical history of metastatic left testicular seminoma, status post left " "orchiectomy, chemotherapy, hypertension, anxiety, bipolar disorder, who developed hearing loss possible left-sided vestibular schwannoma, neuromuscular weakness, diplopia, bowel bladder incontinence following chemotherapy which was stopped.  Patient had extended hospital stay in August to September 2023 for this which also included respiratory failure requiring trach and PEG pulmonary embolism for which patient continues chronic trach/PEG status.  Patient also had hospitalization 5/2024 for multilobar PNA, hypoxic bradycardia felt 2/2 vasal vagal stimulation increased secretions and position of trach with mucous plugging c/b cardiac arrest with pulseless asystole without hypoxia, seizure like actiity felt to not be seizure activity helped apparently with managing secretions and keeping O2 above 90%.  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  There was no electrodiagnostic evidence to support a diffuse neurogenic process such as motor neuron disease or a myopathic process affecting the peripheral nervous system.  Last OP neuro note 7/2024, \"given the lack of an obvious cause of his respiratory failure it is reasonable to consider neuromuscular disease. However, there is no evidence that this is the case. Specifically, there is nothing to suggest primary muscle disease, primary nerve disease, or neuromuscular junction disorders.\"      Patient was admitted at [facility] from 10/5-10/31.  Prior at times he was eating and drinking orally and when doing so the family would hold tube feeds and it was reported he mostly drank flavored drinks.  He had slow functional decline for prior few month.  He saw urology " "who started him on IM testosterone biweekly for over a month.  He was noted to have  increased agitation/restlessness (per family he did not have these prior) and then significant more agitation and anger and was recommended to hold testosterone with last dose 9/30.    On 10/5, patient apparently pulled out PEG during bout of agitation and presented to hospital where G tube was replaced but he was noted to be hypoxic with copious secretions and febrile.  He required adjustments to ventilation.  He was found to have complete L sided consolidation along with severe hypernatremia 176.  Patient was treated for PNA and sepsis with Abx.   Patient received IVF and nephro consulted for hypernatremia which was felt to be 2/2 dehydration which has been improving.  He had episode of rhythmic body jerking episode and additional shaking episodes with hypoxia and bradycardia with hypotension requiring pressors.  He was placed on vEEG which captures some of these shaking spells which did not correlate with EEG seizures.  Neuro felt spells may be due to vagal response and possibly convulsive syncope in setting of hypoxia/bradycardia with possible functional component.  They did not feel he needed AED.  He did have multiple mucous plugging episodes requiring bronch suctioning.  He did require midodrine for hypotension which was eventual weaned down.  MRI brain obtained and showed mildly restricted diffusion in the splenium of the corpus callosum and more subtly in the anterior cingulate gyri. Possible sequela of hypoxic ischemic injury, inflammatory or infectious process. Per neuro \" with abnormal lesion in the splenium of corpus callosum (without post contrast enhancement), thus neurology asked to weigh in on MRI findings (CLOCC; cytotoxic lesion of corpus collosum, can carry broad spectrum of possible etiologies).\"Unclear etiology at this time. Would recommend repeat MRI in 1 month to re-evaluate findings.\"  MRI C spine showed " "long segment of abnormal signal in the cervical and upper thoracic spine favored to represent nonspecific transverse myelitis.  No cord expansion, atrophy or abnormal enhancement. Recommend clinical correlation and further evaluation with CSF. Recommend 3-month follow-up contrast-enhanced MRI.   (Neuro felt possible nonacute TM v sequelae of old SC infarcts).   LP showed elevated opening pressure 43 but otherwise unremarkable.  Neuro felt etiology of weakness unclear but could not rule out CIDP and he was administered 5 day course of IVIG.\" He also had generalized rash which derm bx'd that was primarily fungal and he was started on antifungals.  He was eventually discharged home with family.      Patient presented to ED with dislodged G tube 12/10.  [place] [facility] [place] [facility] on 12/16-12/27/25 following episode of increased agitation requiring Versed found to have completed LLL collapse and severe atelectasis similar to prior exam in Oct.  He was dx'd with sepsis with sputum+ pseudomonas and serratia and treated with course of Cefepime.  His vent setting overall were stable but he did have episodes of rhythmic shaking and possibly breath holding felt to be behavioral.  He required precedex and restraints at times.  Neuro did not have new recs.  Psych consulted as well.  He eventually stabilized and d/c'd home.  Presented to ED 2/9 for accidentally pulling out PEG tube.  Presented to ED 2/13 for possible suicide attempt with removing PEG tube and removing self from vent at home multiple times. He required Zyprexa, ativan, and restraints.  He was hypercarbic which improved and medically stabilized and patient cleared by crisis for d/c and OP psych follow-up.  On way home from hospital on 2/15 he was noted to have AMS and was brought back to hospital noted to be hypotensive with severe hypoxic events in ER.  He did receive IVF and again stabilized and discharged on 2/18.  Patient saw Palliative Care " in in-home visit 2/26 where sister Dmitry is decision maker without safety concerns in home at that time with plan to avoid long term care facility and remain at home.  On 3/3 EMS called for acute agitation with kicking and hitting family members who required Versed and was brought to ED at [facility] and required additional sedation.  ICU was at capacity and [facility] was [facility].  He did require brief precedex.   consulted and patient currently on Seroquel 50mg BID and 200mg QHS with plan to transition from PRN ativan to PRN Zyprexa 5-10mg Q6H PRN and provide lexapro 10mg qday with 1:1.  His PEG tube became clogged yesterday and he has not received overnight and AM seroquel but has received 2 5mg IM Zyprexa doses at 2100 and 300.  Palliative consulted as well and family does not want patient to go to facility for long term care.      On eval, patient lying in bed in ICU with trach on vent asleep.  He is able to open eyes to loud verbal command and keep eyes open for short periods only.  He can nod yes and no but does have neck weakness and lacks full neck ROM.  His yes no head nods are not consistently correct and was not able to communicate adequately by other means.  He appeared to deny any pain but ros is not reliable at this time.   He was able to move arms and legs somewhat to command and squeeze hands to commands which was reproducible.      Review of Systems:     ROS: Could not be adequately (or fully adequately be) obtained due to degree of impaired cognition/communication at time of encounter.    Allergies   Allergen Reactions    Dog Epithelium Cough, Sneezing and Nasal Congestion       Current Facility-Administered Medications:     acetaminophen (Ofirmev) injection 1,000 mg, 1,000 mg, Intravenous, Q6H PRN, AMBER Mcelroy    acetylcysteine (MUCOMYST) 200 mg/mL inhalation solution 600 mg, 3 mL, Nebulization, Q6H, Jovan Holden MD, 3 mL at 03/07/25 0756    apixaban (ELIQUIS) tablet  5 mg, 5 mg, Per PEG Tube, BID, Navarro Lara MD, 5 mg at 03/06/25 0812    chlorhexidine (PERIDEX) 0.12 % oral rinse 15 mL, 15 mL, Mouth/Throat, Q12H ISAEL, Navarro Lara MD, 15 mL at 03/06/25 2103    escitalopram (LEXAPRO) tablet 10 mg, 10 mg, Oral, Daily, Navarro Lara MD    ipratropium-albuterol (DUO-NEB) 0.5-2.5 mg/3 mL inhalation solution 3 mL, 3 mL, Nebulization, Q6H, Navarro Lara MD, 3 mL at 03/07/25 0756    OLANZapine (ZyPREXA) IM injection 5 mg, 5 mg, Intramuscular, Q6H PRN, Navarro Lara MD, 5 mg at 03/07/25 0308    polyethylene glycol (MIRALAX) packet 17 g, 17 g, Oral, Daily PRN, Navarro Lara MD    QUEtiapine (SEROquel) tablet 200 mg, 200 mg, Per G Tube, HS, AMBER Fleming, 200 mg at 03/05/25 2328    QUEtiapine (SEROquel) tablet 50 mg, 50 mg, Per PEG Tube, BID, AMBER Fleming, 50 mg at 03/06/25 0812    Past Medical History:   Diagnosis Date    Anxiety     Bradycardia 06/01/2024    Cancer (HCC)     Depression     Hypernatremia 10/06/2024    Migration of percutaneous endoscopic gastrostomy (PEG) tube (HCC) 09/24/2023    Psychiatric disorder     bipolar    Sepsis (HCC) 08/24/2023       Past Surgical History:   Procedure Laterality Date    IR BIOPSY LYMPH NODE  01/20/2023    IR GASTROSTOMY (G) TUBE CHECK/CHANGE/REINSERTION/UPSIZE  06/18/2024    IR GASTROSTOMY TUBE PLACEMENT  09/25/2023    IR LUMBAR PUNCTURE  09/06/2023    IR LUMBAR PUNCTURE  10/25/2024    IR PORT PLACEMENT  03/14/2023    ORCHIECTOMY Left 12/14/2022    Procedure: ORCHIECTOMY INGUINAL;  Surgeon: Flip Michael MD;  Location:  MAIN OR;  Service: Urology    PEG W/TRACHEOSTOMY PLACEMENT N/A 09/08/2023    Procedure: TRACHEOSTOMY WITH INSERTION PEG TUBE;  Surgeon: Rudy Mcmanus MD;  Location: [facility];  Service: General    TESTICLE SURGERY        Family History   Problem Relation Age of Onset    Coronary artery disease Maternal Aunt     Cancer Father     Diabetes Father     Hypertension Father        Social History available currently in EMR:  (See  "additional SH as outlined above)   Social History     Socioeconomic History    Marital status: Single     Spouse name: None    Number of children: None    Years of education: None    Highest education level: None   Occupational History    None   Tobacco Use    Smoking status: Former     Current packs/day: 0.00     Average packs/day: 0.7 packs/day for 22.7 years (15.0 ttl pk-yrs)     Types: Cigarettes     Start date: 2008     Quit date: 2023     Years since quittin.7     Passive exposure: Never    Smokeless tobacco: Never   Vaping Use    Vaping status: Former    Start date: 2018    Quit date: 2023    Substances: Nicotine, THC   Substance and Sexual Activity    Alcohol use: Not Currently     Comment: 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 Not currently using crack cocaine.    Sexual activity: Not Currently     Partners: Female     Birth control/protection: Other   Other Topics Concern    None   Social History Narrative    ** Merged History Encounter **          Social Drivers of Health     Financial Resource Strain: Low Risk  (2024)    Overall Financial Resource Strain (CARDIA)     Difficulty of Paying Living Expenses: Not hard at all   Food Insecurity: No Food Insecurity (3/4/2025)    Hunger Vital Sign     Worried About Running Out of Food in the Last Year: Never true     Ran Out of Food in the Last Year: Never true   Transportation Needs: No Transportation Needs (3/4/2025)    PRAPARE - Transportation     Lack of Transportation (Medical): No     Lack of Transportation (Non-Medical): No   Physical Activity: Not on file   Stress: Not on file   Social Connections: Not on file   Intimate Partner Violence: Patient Unable To Answer (3/4/2025)    Nursing IPS     Feels Physically and Emotionally Safe: Not on file     Physically Hurt by Someone: Not on file     Humiliated or Emotionally Abused by " Someone: Not on file     Physically Hurt by Someone: Patient unable to answer     Hurt or Threatened by Someone: Patient unable to answer   Housing Stability: Low Risk  (3/4/2025)    Housing Stability Vital Sign     Unable to Pay for Housing in the Last Year: No     Number of Times Moved in the Last Year: 1     Homeless in the Last Year: No       Physical Examination:   Temp:  [98.3 °F (36.8 °C)-101.2 °F (38.4 °C)] 98.3 °F (36.8 °C)  HR:  [] 85  BP: ()/() 144/100  Resp:  [15-36] 17  SpO2:  [87 %-99 %] 98 %  O2 Device: Ventilator  FiO2 (%):  [35] 35  General: Lying in bed - trached/vent   HENT:  Trach in place, MMM  Respiratory: Vent  Cardiovascular: Regular rate   Gastrointestinal: Soft, non-distended, normoactive, nontender, PEG in place  SkiN/MSK/Extremities:  No calf edema, no calf tenderness to palpation; Lacks about 20-30 deg of full knee ext;   Neurologic/Psych:   able to open eyes to loud verbal command and keep eyes open for short periods only.  He can nod yes and no but does have neck weakness and lacks full neck ROM.  His yes no head nods are not consistently correct and was not able to communicate adequately by other means.  He appeared to deny any pain but ros is not reliable at this time.   He was able to move arms and legs somewhat to command and squeeze hands to commands which was reproducible.    LUE SA 1+, EF/EE 2-, FF 4  RUE SA trace, EF/EE 1-2-/5 FF 3-3+  BLE approx 2- ; bit stronger on R  Reflexes 0/4  Mild increased tone EE b/l only     Data:  Lab Results   Component Value Date    HGB 9.8 (L) 03/07/2025    HCT 31.1 (L) 03/07/2025    WBC 13.16 (H) 03/07/2025    K 4.5 03/07/2025    K 3.9 12/19/2023    CL 99 03/07/2025     12/19/2023    GLUCOSE 129 12/24/2024    CREATININE 0.51 (L) 03/07/2025    CREATININE 0.68 12/19/2023    BUN 12 03/07/2025    BUN 7 12/19/2023       XR chest portable ICU  Result Date: 3/5/2025  Impression: No convincing interval change in the layering  pleural effusions and bibasilar consolidations, which may represent atelectasis or pneumonia. Workstation performed: [id number]     XR chest 1 view portable  Result Date: 3/4/2025  Impression: No congestion Bibasilar densities seen which may be due to atelectasis/consolidation, unchanged No new consolidation Workstation performed: [id number]       Pertinent labs and imaging reviewed.      Patient seen on date of service listed.    80 minutes or greater spent for this encounter which included a combination of face-to-face time with patient and non-face-to-face time which in part specifically includes management of neuropathy, resp failure.  Face-to-face time included extended discussion with patient regarding current condition, medical history, mood, medical/rehabilitation management, and disposition.  Non face-to-face time included coordination of care with patient's co-managing AP and/or physician(s) thru communication and/or review of their recent documentation as well as reviewing vitals, bowel/bladder function, recent labs, diagnostic imaging, and notes from therapy, CM, and nursing.      Thank you for allowing the PM&R service to participate in the care of this patient. We will continue to follow Hemanth Swanson's progress with you. Please do not hesitate to call with questions or concerns.    Kilo Jennings MD, MS  [facility]  Physical Medicine and Rehabilitation  Brain Injury Medicine

## 2025-03-07 NOTE — PLAN OF CARE
Problem: PAIN - ADULT  Goal: Verbalizes/displays adequate comfort level or baseline comfort level  Description: Interventions:  - Encourage patient to monitor pain and request assistance  - Assess pain using appropriate pain scale  - Administer analgesics based on type and severity of pain and evaluate response  - Implement non-pharmacological measures as appropriate and evaluate response  - Consider cultural and social influences on pain and pain management  - Notify physician/advanced practitioner if interventions unsuccessful or patient reports new pain  3/6/2025 2125 by Maureen Doyle RN  Outcome: Progressing  3/6/2025 2125 by Maureen Doyle RN  Outcome: Progressing     Problem: SAFETY ADULT  Goal: Patient will remain free of falls  Description: INTERVENTIONS:  - Educate patient/family on patient safety including physical limitations  - Instruct patient to call for assistance with activity   - Consult OT/PT to assist with strengthening/mobility   - Keep Call bell within reach  - Keep bed low and locked with side rails adjusted as appropriate  - Keep care items and personal belongings within reach  - Initiate and maintain comfort rounds  - Make Fall Risk Sign visible to staff  - Offer Toileting every 2 Hours, in advance of need  - Initiate/Maintain bed alarm  - Obtain necessary fall risk management equipment:   - Apply yellow socks and bracelet for high fall risk patients  - Consider moving patient to room near nurses station  3/6/2025 2125 by Maureen Doyle RN  Outcome: Progressing  3/6/2025 2125 by Maureen Doyle RN  Outcome: Progressing  Goal: Maintain or return to baseline ADL function  Description: INTERVENTIONS:  -  Assess patient's ability to carry out ADLs; assess patient's baseline for ADL function and identify physical deficits which impact ability to perform ADLs (bathing, care of mouth/teeth, toileting, grooming, dressing, etc.)  - Assess/evaluate cause of self-care deficits   - Assess range of motion  -  Assess patient's mobility; develop plan if impaired  - Assess patient's need for assistive devices and provide as appropriate  - Encourage maximum independence but intervene and supervise when necessary  - Involve family in performance of ADLs  - Assess for home care needs following discharge   - Consider OT consult to assist with ADL evaluation and planning for discharge  - Provide patient education as appropriate  3/6/2025 2125 by Maureen Doyle RN  Outcome: Progressing  3/6/2025 2125 by Maureen Doyle RN  Outcome: Progressing  Goal: Maintains/Returns to pre admission functional level  Description: INTERVENTIONS:  - Perform AM-PAC 6 Click Basic Mobility/ Daily Activity assessment daily.  - Set and communicate daily mobility goal to care team and patient/family/caregiver.   - Collaborate with rehabilitation services on mobility goals if consulted  - Perform Range of Motion 4 times a day.  - Reposition patient every 2 hours.    - Out of bed for toileting  - Record patient progress and toleration of activity level   3/6/2025 2125 by Maureen Doyle RN  Outcome: Progressing  3/6/2025 2125 by Maureen Doyle RN  Outcome: Progressing     Problem: DISCHARGE PLANNING  Goal: Discharge to home or other facility with appropriate resources  Description: INTERVENTIONS:  - Identify barriers to discharge w/patient and caregiver  - Arrange for needed discharge resources and transportation as appropriate  - Identify discharge learning needs (meds, wound care, etc.)  - Arrange for interpretive services to assist at discharge as needed  - Refer to Case Management Department for coordinating discharge planning if the patient needs post-hospital services based on physician/advanced practitioner order or complex needs related to functional status, cognitive ability, or social support system  3/6/2025 2125 by Maureen Doyle RN  Outcome: Progressing  3/6/2025 2125 by Maureen Doyle RN  Outcome: Progressing     Problem: Knowledge Deficit  Goal:  Patient/family/caregiver demonstrates understanding of disease process, treatment plan, medications, and discharge instructions  Description: Complete learning assessment and assess knowledge base.  Interventions:  - Provide teaching at level of understanding  - Provide teaching via preferred learning methods  3/6/2025 2125 by Maureen Doyle RN  Outcome: Progressing  3/6/2025 2125 by Maureen Doyle RN  Outcome: Progressing     Problem: SAFETY,RESTRAINT: NV/NON-SELF DESTRUCTIVE BEHAVIOR  Goal: Remains free of harm/injury (restraint for non violent/non self-detsructive behavior)  Description: INTERVENTIONS:  - Instruct patient/family regarding restraint use   - Assess and monitor physiologic and psychological status   - Provide interventions and comfort measures to meet assessed patient needs   - Identify and implement measures to help patient regain control  - Assess readiness for release of restraint   3/6/2025 2125 by Maureen Doyle RN  Outcome: Progressing  3/6/2025 2125 by Maureen Doyle RN  Outcome: Progressing  Goal: Returns to optimal restraint-free functioning  Description: INTERVENTIONS:  - Assess the patient's behavior and symptoms that indicate continued need for restraint  - Identify and implement measures to help patient regain control  - Assess readiness for release of restraint   3/6/2025 2125 by Maureen Doyle RN  Outcome: Progressing  3/6/2025 2125 by Maureen Doyle RN  Outcome: Progressing     Problem: NEUROSENSORY - ADULT  Goal: Achieves stable or improved neurological status  Description: INTERVENTIONS  - Monitor and report changes in neurological status  - Monitor vital signs such as temperature, blood pressure, glucose, and any other labs ordered   - Initiate measures to prevent increased intracranial pressure  - Monitor for seizure activity and implement precautions if appropriate      3/6/2025 2125 by Maureen Doyle RN  Outcome: Progressing  3/6/2025 2125 by Maureen Doyle RN  Outcome: Progressing      Problem: CARDIOVASCULAR - ADULT  Goal: Absence of cardiac dysrhythmias or at baseline rhythm  Description: INTERVENTIONS:  - Continuous cardiac monitoring, vital signs, obtain 12 lead EKG if ordered  - Administer antiarrhythmic and heart rate control medications as ordered  - Monitor electrolytes and administer replacement therapy as ordered  3/6/2025 2125 by Maureen Doyle RN  Outcome: Progressing  3/6/2025 2125 by Maureen Doyle RN  Outcome: Progressing     Problem: RESPIRATORY - ADULT  Goal: Achieves optimal ventilation and oxygenation  Description: INTERVENTIONS:  - Assess for changes in respiratory status  - Assess for changes in mentation and behavior  - Position to facilitate oxygenation and minimize respiratory effort  - Oxygen administered by appropriate delivery if ordered  - Initiate smoking cessation education as indicated  - Encourage broncho-pulmonary hygiene including cough, deep breathe, Incentive Spirometry  - Assess the need for suctioning and aspirate as needed  - Assess and instruct to report SOB or any respiratory difficulty  - Respiratory Therapy support as indicated  3/6/2025 2125 by Maureen Doyle RN  Outcome: Progressing  3/6/2025 2125 by Maureen Doyle RN  Outcome: Progressing     Problem: GENITOURINARY - ADULT  Goal: Absence of urinary retention  Description: INTERVENTIONS:  - Assess patient’s ability to void and empty bladder  - Monitor I/O  - Bladder scan as needed  - Discuss with physician/AP medications to alleviate retention as needed  - Discuss catheterization for long term situations as appropriate  3/6/2025 2125 by Maureen Doyle RN  Outcome: Progressing  3/6/2025 2125 by Maureen Doyle RN  Outcome: Progressing     Problem: MUSCULOSKELETAL - ADULT  Goal: Maintain or return mobility to safest level of function  Description: INTERVENTIONS:  - Assess patient's ability to carry out ADLs; assess patient's baseline for ADL function and identify physical deficits which impact ability to perform  ADLs (bathing, care of mouth/teeth, toileting, grooming, dressing, etc.)  - Assess/evaluate cause of self-care deficits   - Assess range of motion  - Assess patient's mobility  - Assess patient's need for assistive devices and provide as appropriate  - Encourage maximum independence but intervene and supervise when necessary  - Involve family in performance of ADLs  - Assess for home care needs following discharge   - Consider OT consult to assist with ADL evaluation and planning for discharge  - Provide patient education as appropriate  3/6/2025 2125 by Maureen Doyle RN  Outcome: Progressing  3/6/2025 2125 by Maureen Doyle RN  Outcome: Progressing     Problem: Nutrition/Hydration-ADULT  Goal: Nutrient/Hydration intake appropriate for improving, restoring or maintaining nutritional needs  Description: Monitor and assess patient's nutrition/hydration status for malnutrition. Collaborate with interdisciplinary team and initiate plan and interventions as ordered.  Monitor patient's weight and dietary intake as ordered or per policy. Utilize nutrition screening tool and intervene as necessary. Determine patient's food preferences and provide high-protein, high-caloric foods as appropriate.     INTERVENTIONS:  - Monitor oral intake, urinary output, labs, and treatment plans  - Assess nutrition and hydration status and recommend course of action  - Evaluate amount of meals eaten  - Assist patient with eating if necessary   - Allow adequate time for meals  - Recommend/ encourage appropriate diets, oral nutritional supplements, and vitamin/mineral supplements  - Order, calculate, and assess calorie counts as needed  - Recommend, monitor, and adjust tube feedings and TPN/PPN based on assessed needs  - Assess need for intravenous fluids  - Provide specific nutrition/hydration education as appropriate  - Include patient/family/caregiver in decisions related to nutrition  3/6/2025 2125 by Maureen Doyle RN  Outcome:  Progressing  3/6/2025 2125 by Maureen Doyle, RN  Outcome: Progressing

## 2025-03-07 NOTE — ASSESSMENT & PLAN NOTE
Of note, this is a recurrent issue. Patient frequently tried to pull out trach/PEG during combative/agitated episodes.  There was initial concern regarding SI versus suicidal attempt per last admission notes patient was cleared by psych.  Patient received total 9 mg of Versed and was started on Precedex drip for his agitation.  Upon arrival [facility]-patient was easily agitated/ uncooperative.    Plan:  Appreciate psych recs  Currently on Seroquel 50mg BID with 200mg QHS  Replace Ativan with Zyprexa 5 mg -10 mg every 6 hours as needed for agitation/impulsivity  Lexapro 10 mg daily.  Restraint as needed.  One-to-one  QTc within normal.  Monitor on Tele  Routinely check EKG

## 2025-03-07 NOTE — ASSESSMENT & PLAN NOTE
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   - EMG 7/2025  showed upper extremity axonal sensorimotor neuropathy, though sensory responses in the lower extremities were normal with incidental findings the presence of chronic C5-6 radiculopathy in the right upper extremity, without ongoing denervation with evidence at that time to support diffuse neurogenic process such as motor neuron d/s or myopathic process  - In past, neuro felt etiology of weakness and resp failure unclear and demyelinating d/s could not be ruled out nor could not rule out CIDP and he was administered 5 day course of IVIG 10/2024  - Recommend PT, OT in acute setting to improve bed mobility, upright tolerances, ADLs, strength, conditioning, and decrease risks of immobility  - Decrease risks of complications related to decreased mobility status and monitor for them during course  - MSK - atrophy, contractures, bone demineralization, CV - DVT/PE, orthostasis, decreased CO, Resp - atelectasis, PNA, GI - constipation, reduced appetite,  - retention, incontinence, Skin - pressure injuries  - Optimal bowel/bladder mgmt/hygiene - monitor for retention, infection     - Turn patient Q2H, skin checks minimum Qshift  - ROM of major joints 2-3 times per day and with PT/OT   - OP follow-up with neuro with repeat MRI brain and MRI spine and EMG/NCS unless needed sooner  - Recommend [facility] catalyst bed with verticalization feature to improve sitting and upright tolerances   - Referral to [facility] for additional evaluation reasonable if pt/family open to this     Prior Level of Function and Social history:    Patient lives in 1st floor set-up with ramp entrance in 2  with family - sister Dmitry and Aunt Magaly who provide caregiving.  - Early 2024, he could get to commode but not for months preceding Oct hospitalization.  At that time he was able to move arms and legs but  was weak throughout. He was able to type on computer and phone and follow simple commands and voice words. Patient was eating and drinking orally and they would not provide PEG tube feeds if eating or drinking adequately. Patient would not drink plain water and drank mostly flavored drinks and sometimes soda. He could help some with dressing. He is chronically incontinent of b/b but would notifiy CG's/family when he went. Overall though was dependent with ADLs and mobility; WC, Wallace lift, Electric recliner;     Current Level of Function:    Dependent ADLs and mobility     Disposition recommendation:  Home once stabilized with 24-7 caregiver able to handle medical needs v SNF able to manage vent and patient adequately

## 2025-03-08 VITALS
BODY MASS INDEX: 28.14 KG/M2 | DIASTOLIC BLOOD PRESSURE: 79 MMHG | WEIGHT: 231.04 LBS | TEMPERATURE: 98.5 F | RESPIRATION RATE: 23 BRPM | SYSTOLIC BLOOD PRESSURE: 113 MMHG | OXYGEN SATURATION: 99 % | HEART RATE: 95 BPM

## 2025-03-08 PROCEDURE — NC001 PR NO CHARGE: Performed by: NURSE PRACTITIONER

## 2025-03-08 PROCEDURE — 94640 AIRWAY INHALATION TREATMENT: CPT

## 2025-03-08 PROCEDURE — 94669 MECHANICAL CHEST WALL OSCILL: CPT

## 2025-03-08 PROCEDURE — 94668 MNPJ CHEST WALL SBSQ: CPT

## 2025-03-08 PROCEDURE — 94003 VENT MGMT INPAT SUBQ DAY: CPT

## 2025-03-08 PROCEDURE — 94760 N-INVAS EAR/PLS OXIMETRY 1: CPT

## 2025-03-08 PROCEDURE — 99238 HOSP IP/OBS DSCHRG MGMT 30/<: CPT | Performed by: NURSE PRACTITIONER

## 2025-03-08 RX ORDER — OLANZAPINE 10 MG/2ML
10 INJECTION, POWDER, FOR SOLUTION INTRAMUSCULAR EVERY 6 HOURS PRN
Qty: 1 EACH | Refills: 0 | Status: SHIPPED | OUTPATIENT
Start: 2025-03-08 | End: 2025-04-07

## 2025-03-08 RX ORDER — ESCITALOPRAM OXALATE 10 MG/1
10 TABLET ORAL DAILY
Qty: 30 TABLET | Refills: 0 | Status: SHIPPED | OUTPATIENT
Start: 2025-03-09

## 2025-03-08 RX ORDER — QUETIAPINE FUMARATE 50 MG/1
50 TABLET, FILM COATED ORAL 2 TIMES DAILY
Qty: 60 TABLET | Refills: 0 | Status: SHIPPED | OUTPATIENT
Start: 2025-03-08

## 2025-03-08 RX ORDER — QUETIAPINE FUMARATE 200 MG/1
200 TABLET, FILM COATED ORAL
Qty: 30 TABLET | Refills: 0 | Status: SHIPPED | OUTPATIENT
Start: 2025-03-08 | End: 2025-03-08

## 2025-03-08 RX ORDER — LORAZEPAM 2 MG/ML
1 INJECTION INTRAMUSCULAR ONCE
Status: COMPLETED | OUTPATIENT
Start: 2025-03-08 | End: 2025-03-08

## 2025-03-08 RX ADMIN — IPRATROPIUM BROMIDE AND ALBUTEROL SULFATE 3 ML: .5; 3 SOLUTION RESPIRATORY (INHALATION) at 07:22

## 2025-03-08 RX ADMIN — OLANZAPINE 10 MG: 10 INJECTION, POWDER, FOR SOLUTION INTRAMUSCULAR at 15:04

## 2025-03-08 RX ADMIN — ACETYLCYSTEINE 600 MG: 200 SOLUTION ORAL; RESPIRATORY (INHALATION) at 14:01

## 2025-03-08 RX ADMIN — APIXABAN 5 MG: 5 TABLET, FILM COATED ORAL at 08:50

## 2025-03-08 RX ADMIN — IPRATROPIUM BROMIDE AND ALBUTEROL SULFATE 3 ML: .5; 3 SOLUTION RESPIRATORY (INHALATION) at 14:01

## 2025-03-08 RX ADMIN — ACETYLCYSTEINE 600 MG: 200 SOLUTION ORAL; RESPIRATORY (INHALATION) at 02:25

## 2025-03-08 RX ADMIN — ACETYLCYSTEINE 600 MG: 200 SOLUTION ORAL; RESPIRATORY (INHALATION) at 07:22

## 2025-03-08 RX ADMIN — QUETIAPINE FUMARATE 50 MG: 25 TABLET ORAL at 08:50

## 2025-03-08 RX ADMIN — APIXABAN 5 MG: 5 TABLET, FILM COATED ORAL at 18:22

## 2025-03-08 RX ADMIN — ESCITALOPRAM OXALATE 10 MG: 10 TABLET ORAL at 08:50

## 2025-03-08 RX ADMIN — IPRATROPIUM BROMIDE AND ALBUTEROL SULFATE 3 ML: .5; 3 SOLUTION RESPIRATORY (INHALATION) at 02:24

## 2025-03-08 RX ADMIN — LORAZEPAM 1 MG: 2 INJECTION INTRAMUSCULAR; INTRAVENOUS at 17:47

## 2025-03-08 RX ADMIN — CHLORHEXIDINE GLUCONATE 0.12% ORAL RINSE 15 ML: 1.2 LIQUID ORAL at 08:50

## 2025-03-08 RX ADMIN — QUETIAPINE FUMARATE 50 MG: 25 TABLET ORAL at 18:22

## 2025-03-08 NOTE — ASSESSMENT & PLAN NOTE
Patient and family wish to stay in the home  Family very capable of caring for patient.  Appreciate palliative care consult. family prefers that the patient be discharged home.

## 2025-03-08 NOTE — ASSESSMENT & PLAN NOTE
Follows with neurology- unclear etiology.  Currently in the process to establish care with [facility]

## 2025-03-08 NOTE — ASSESSMENT & PLAN NOTE
Of note, this is a recurrent issue. Patient frequently tried to pull out trach/PEG during combative/agitated episodes.  There was initial concern regarding SI versus suicidal attempt per last admission notes patient was cleared by psych.  Patient received total 9 mg of Versed and was started on Precedex drip for his agitation.  Upon arrival [facility] ICU-patient was easily agitated/ uncooperative.  Prescriptions for Zyprexa, syringes and needles will be provided to help with agitation.  Prescription for Seroquel will also be provided.  Prescription for Lexapro will also be provided  Discharge home today 8 March 2025.    Plan:  Appreciate psych recs  Currently on Seroquel 50mg BID with 200mg QHS  Replace Ativan with Zyprexa 5 mg -10 mg every 6 hours as needed for agitation/impulsivity  Lexapro 10 mg daily.    QTc within normal Parameters

## 2025-03-08 NOTE — DISCHARGE SUMMARY
Discharge Summary - Critical Care/ICU   Name: Hemanth Swanson 39 y.o. male I MRN: [id number]  Unit/Bed#: ICU 03 I Date of Admission: 3/4/2025   Date of Service: 3/8/2025 I Hospital Day: 4    Admission Date: 3/4/2025 0745  Discharge Date: 03/08/25  Admitting Diagnosis: acute agitation  Discharge Diagnosis:   Medical Problems       Resolved Problems  Date Reviewed: 3/8/2025          Resolved    Bradycardia 3/5/2025     Resolved by  Navarro Lara MD          HPI: 39-year-old man with a known past medical history significant for axonal demyelinating polyneuropathy.  The patient is dependent secondary to that diagnosis.  He has a tracheostomy and a PEG tube for feeding.  Patient is typically taken care of at home by his family.    Patient also has a history of pulmonary embolus.  He takes Eliquis secondary to that diagnosis.  The patient was transported to the emergency department secondary to acute agitation.  He had become combative with his family trying to remove his tracheostomy and PEG tube.  He was kicking and attempting to strike family members.    The patient is treated with Seroquel at home.  His dose was increased from 200-250.  Without significant improvement in his behavior.  En route to the emergency department he was treated with Versed 4 mg.  Once in the emergency department he was given a additional dose of 5 mg of Versed and placed on a Precedex drip.  This combination did help the patient.    The patient has had previous episodes of this nature once again secondary to his frustration with his situation.  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  This transition has been very difficult for him.  There was some concern for suicidal ideation.  However, a psychiatric eval revealed that this was more of a attention seeking gesture than suicide.  Psych consult recommended adding Zyprexa, Lexapro and to continue Seroquel.  While in the intensive care unit this  combination appears to have worked well.    He has been cooperative and understands that pulling out his trach could be catastrophic.  And he is eager to go home.  He offers no complaints at this time.      Procedures Performed: PEG tube replaced by interventional radiology    Summary of Hospital Course:  see HPI    Significant Findings, Care, Treatment and Services Provided: Psychiatric evaluation, interventional radiology replacing clogged PEG tube, one-to-one supervision.    Complications: Agitation, attempts to remove tracheostomy and PEG tube.    Condition at Discharge: fair       Discharge instructions/Information to patient and family:   See After Visit Summary (AVS) for information provided to patient and family.      Provisions for Follow-Up Care:  See after visit summary for information related to follow-up care and any pertinent home health orders.      PCP: Ela Douglas MD    Disposition: Home    Planned Readmission: No     Discharge Medications:  See after visit summary for reconciled discharge medications provided to patient and family.      Discharge Statement:  I have spent a total time of 28 minutes in caring for this patient on the day of the visit/encounter. .

## 2025-03-08 NOTE — PROGRESS NOTES
Progress Note - Critical Care/ICU   Name: Hemanth Swanson 39 y.o. male I MRN: [id number]  Unit/Bed#: ICU 03 I Date of Admission: 3/4/2025   Date of Service: 3/8/2025 I Hospital Day: 4      Assessment & Plan  Chronic respiratory failure with hypoxia and hypercapnia (HCC)  S/p trach/ vent dependent for neuromuscular disease  Of note- Chronic settings of 18/500/8/40%  5.0 XLT Shiley trach.  Known history of mucous plugging & intentional holding breath    Plan:  Continue full vent support  Pulmonary toilet; continue home duo-nebs and 3% nebs q6h and chest PT TID   Maintain O2 sat 90% or above   VAP preventions; chlorhexidine, PPI, HOB greater than 30 degrees  With suspicion of bilateral diaphragm/cranial nerve paralysis-patient could be a candidate for ?diaphragm pacer.  Patient and family are interested with second option in UOP  Agitation  Of note, this is a recurrent issue. Patient frequently tried to pull out trach/PEG during combative/agitated episodes.  There was initial concern regarding SI versus suicidal attempt per last admission notes patient was cleared by psych.  Patient received total 9 mg of Versed and was started on Precedex drip for his agitation.  Upon arrival [place] ICU-patient was easily agitated/ uncooperative.    Plan:  Appreciate psych recs  Currently on Seroquel 50mg BID with 200mg QHS  Replace Ativan with Zyprexa 5 mg -10 mg every 6 hours as needed for agitation/impulsivity  Lexapro 10 mg daily.  Restraint as needed.  One-to-one  QTc within normal.  Monitor on Tele  Routinely check EKG  Mood disorder (HCC)  See above.  Ventilator dependent (HCC)  Patient and family wish to stay in the home  Family very capable of caring for patient.  Appreciate palliative care consult- appears that patient and family/ POA refused LTAC. They would like to be discharged home.  History of pulmonary embolus (PE)  Continue home Eliquis  Mixed axonal-demyelinating polyneuropathy  Follows with neurology- unclear  "etiology.  Currently in the process to establish care with [facility]  Supportive care  Status post tracheostomy (HCC)  Noted history. Vent dependent  S/P percutaneous endoscopic gastrostomy (PEG) tube placement (HCC)  Recently replaced 2/13 after self removal  3/6: G-tube clotted-replaced by IR 3/7  Continue tube feedings at goal after replacement.  ? Suicidal risk  It is difficult to clearly eval patient's SI/ SA since nonverbal, noncooperative and behavioral issue.   However, per chart review- There was always a rising concern of actual SI/ suicidal attempt. In his last admission- there was a clear documentation \"Dmitry rebuttal was that the behavioral outbursts in which he removes the trach are rare and designed to draw attention when he is uncomfortable or sick.\"  Patient admitted he is very frustrated to his current situation and he is aware that he is at very high mortality risk after pulling out his Trach.  Pulling out trach/ PEG ---> ?Suicidal attempt   Would appreciate psych service revisit  Patient denied any suicidal ideation. However, he is not able to indicate the reasoning for the behavior trying to remove trach/PEG.  Continue 1:1 close monitoring.  Disposition: Critical care    ICU Core Measures     Vented Patient  VAP Bundle  VAP bundle ordered     A: Assess, Prevent, and Manage Pain Has pain been assessed? Yes  Need for changes to pain regimen? No   B: Both Spontaneous Awakening Trials (SATs) and Spontaneous Breathing Trials (SBTs) Plan to perform spontaneous awakening trial today? No secondary to vent dependent with tracheostomy the patient is not sedated  Plan to perform spontaneous breathing trial today? Yes   Obvious barriers to extubation? NA   C: Choice of Sedation RASS Goal: 0 Alert and Calm  Need for changes to sedation or analgesia regimen? NA   D: Delirium CAM-ICU: Negative   E: Early Mobility  Plan for early mobility? Yes   F: Family Engagement Plan for family engagement today? Yes "       Review of Invasive Devices:      Central access plan:  Patient has not accessed port in the right subclavian location      Prophylaxis:  VTE VTE covered by:  apixaban, Per PEG Tube, 5 mg at 03/07/25 2114       Stress Ulcer  not ordered         24 Hour Events : Patient slept overnight without any signs of agitation.  He denies complaints of pain, headache, visual disturbance, difficulty breathing, although he does complain of shortness of breath while agitated.  The patient had no complaints overnight.  He continues to have his left upper extremity restrained.  Subjective   Review of Systems: See HPI for Review of Systems    Objective :                   Vitals I/O      Most Recent Min/Max in 24hrs   Temp 98 °F (36.7 °C) Temp  Min: 97.8 °F (36.6 °C)  Max: 98.3 °F (36.8 °C)   Pulse 72 Pulse  Min: 64  Max: 91   Resp 16 Resp  Min: 16  Max: 36   BP 98/54 BP  Min: 90/50  Max: 178/87   O2 Sat 94 % SpO2  Min: 91 %  Max: 100 %      Intake/Output Summary (Last 24 hours) at 3/8/2025 0645  Last data filed at 3/8/2025 0550  Gross per 24 hour   Intake 1598 ml   Output 750 ml   Net 848 ml       Diet Enteral/Parenteral; Tube Feeding No Oral Diet; Jevity 1.5; Continuous; 65; 100; Water; Every 4 hours    Invasive Monitoring           Physical Exam   Physical Exam  Vitals reviewed.   Eyes:      Extraocular Movements: Extraocular movements intact.      Pupils: Pupils are equal, round, and reactive to light.   Skin:     General: Skin is warm and dry.      Capillary Refill: Capillary refill takes less than 2 seconds.   HENT:      Head: Normocephalic and atraumatic.      Right Ear: Hearing normal.      Left Ear: Hearing normal.      Nose:      Comments: Normal     Mouth/Throat:      Mouth: Mucous membranes are moist.   Neck:      Comments: Supple no JVD no lymphadenopathy trachea midline.  Tracheostomy present  Cardiovascular:      Rate and Rhythm: Normal rate and regular rhythm.      Pulses: Normal pulses.      Heart sounds: Normal  heart sounds.   Musculoskeletal:      Comments: Flaccid right upper extremity, normal range of motion of the balance of his extremities   Abdominal: General: Bowel sounds are normal.      Palpations: Abdomen is soft.      Comments: PEG tube in place and patent, tolerating tube feeds   Constitutional:       Appearance: He is well-developed and well-nourished.   Pulmonary:      Effort: Pulmonary effort is normal.      Breath sounds: Normal breath sounds.      Comments: Vent dependent.  Tracheostomy.  Neurological:      General: No focal deficit present.      Mental Status: He is alert and oriented to person, place and time. He is calm.      Motor: Motor deficit.        Corneal reflex present, cough reflex and gag reflex intact.      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, right upper extremity is flaccid and Flaccid right upper extremity          Diagnostic Studies        Lab Results: I have reviewed the following results:     Medications:  Scheduled PRN   acetylcysteine, 3 mL, Q6H  apixaban, 5 mg, BID  chlorhexidine, 15 mL, Q12H ISAEL  escitalopram, 10 mg, Daily  ipratropium-albuterol, 3 mL, Q6H  QUEtiapine, 200 mg, HS  QUEtiapine, 50 mg, BID      Acetaminophen, 650 mg, Q4H PRN  OLANZapine, 10 mg, Q6H PRN Max 3/day  polyethylene glycol, 17 g, Daily PRN       Continuous          Labs:   CBC    Recent Labs     03/07/25  0424   WBC 13.16*   HGB 9.8*   HCT 31.1*        BMP    Recent Labs     03/07/25  0500   SODIUM 139   K 4.5   CL 99   CO2 30   AGAP 10   BUN 12   CREATININE 0.51*   CALCIUM 9.6       Coags    No recent results     Additional Electrolytes  Recent Labs     03/07/25  0501   MG 2.2          Blood Gas    No recent results  No recent results LFTs  No recent results    Infectious  Recent Labs     03/07/25  0526   PROCALCITONI 0.40*     Glucose  Recent Labs     03/07/25  0500   GLUC 85

## 2025-03-08 NOTE — CASE MANAGEMENT
Case Management Discharge Planning Note    Patient name Hemanth Swanson  Location ICU 03/ICU 03 MRN [id number]  : 1985 Date 3/8/2025       Current Admission Date: 3/4/2025  Current Admission Diagnosis:Chronic respiratory failure with hypoxia and hypercapnia (HCC)   Patient Active Problem List    Diagnosis Date Noted Date Diagnosed    Palliative care patient 2025     ? Suicidal risk 2025     Copious oral secretions 2025     Mood disorder (HCC) 2024     Abnormal movements 2024     Agitation 2024     History of pulmonary embolus (PE) 2024     Rash of back 10/25/2024     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HCC) 10/17/2024     Palliative care by specialist 10/11/2024     Cardiac arrest due to other underlying condition ([facility]) 2024     Seizure-like activity (HCC) 2024     Ventilator dependent ([facility]) 2024     Obesity hypoventilation syndrome (HCC) 10/24/2023     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     Impaired mobility 2023     Status post tracheostomy (HCC) 2023     S/P percutaneous endoscopic gastrostomy (PEG) tube placement (HCC) 2023     Anxiety 2023     Chronic respiratory failure with hypoxia and hypercapnia (HCC) 2023     Depression 2023     History of LVH (left ventricular hypertrophy) 2023     Pure hypertriglyceridemia 2023     Unilateral vestibular schwannoma (HCC) 2023     Port-A-Cath in place 2023     Diplopia 2023     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HCC) 2023     Umbilical hernia without obstruction and without gangrene 12/10/2022     Tobacco use 12/10/2022     Retroperitoneal lymphadenopathy 12/10/2022       LOS (days): 4  Geometric Mean LOS (GMLOS) (days): 2.3  Days to GMLOS:-2     OBJECTIVE:  Risk of Unplanned Readmission Score: 37.75         Current admission status: Inpatient   Preferred Pharmacy:   [facility]/pharmacy #[id number]  RAFAEL TAVERAS  [address] [place] [address]  [facility] [address]  Phone: [phone] Fax: [phone]    Primary Care Provider: Ela Douglas MD    Primary Insurance: [facility]  Secondary Insurance:     DISCHARGE DETAILS:    Discharge planning discussed with:: Dmitry Jarquin [phone]  Freedom of Choice: Yes  Comments - Freedom of Choice: Family prefers home w/o VNA services.  CM contacted family/caregiver?: Yes  Were Treatment Team discharge recommendations reviewed with patient/caregiver?: Yes  Did patient/caregiver verbalize understanding of patient care needs?: Yes  Were patient/caregiver advised of the risks associated with not following Treatment Team discharge recommendations?: Yes    Contacts  Patient Contacts: Dmitry Jarquin  Relationship to Patient:: Family  Contact Method: Phone  Phone Number: [phone]  Reason/Outcome: Continuity of Care, Discharge Planning, Emergency Contact    Requested Home Health Care         Is the patient interested in HHC at discharge?: No    Discharge Destination Plan:: Home (w/ waiver hha services provided by Pt's Sister and Grandmother.)  Transport at Discharge : CCT Nurse Level  Number/Name of Dispatcher: [facility]  Transported by (Company and Unit #): [facility]  ETA of Transport (Date): 03/08/25  ETA of Transport (Time): 1930    Additional Comments: CM informed by the ICU Team that discharge is planned for today, pending home care set up and transportation issues explored. After thorough review of Pt's medical chart and insurance, discussion with ICU Team and a telephone call to Pt's sister Dmitry, CM had the information needed to clarify priorty needs. First- Sister is declining VNA services. Sister Dmitry and Pt's Grandmother are Pt's caregivers through the waiver program (specifically [facility] [employer]). To date, they have only been approved for 20 hours a week.  states they have applied for 24/7 caregiver support, but unfortunately this has not  been reviewed due to frequent turnover of service coordinators (assisgned to patients and families who receive waiver services). CM encouraged her to continue advocating for the additional hours. Second- Transportation to and from Doctor appointments.  reports that there is a program through [facility], however -the frequent turnover of service coordinators has severely impacted this benefit as well. RISHABH provided Pt's Sister with information for [facility], the organisaton that arranges non-emergent transportation for Medicaid recipients. Pt's Sister states she will call on Monday. With priority needs addressed, Sister Dmitry reported that they are ready to bring Pt home. RISHABH scheduled a CCT w/ RN for vent support to transport home. RISHABH informed Sister Dmitry of same. No other needs at this time. CM remains available through discharge as needed.

## 2025-03-08 NOTE — ASSESSMENT & PLAN NOTE
Recently replaced 2/13 after self removal  3/6: G-tube clotted-replaced by IR 3/7  Continue tube feedings at goal after replacement.

## 2025-03-08 NOTE — ASSESSMENT & PLAN NOTE
Of note, this is a recurrent issue. Patient frequently tried to pull out trach/PEG during combative/agitated episodes.  There was initial concern regarding SI versus suicidal attempt per last admission notes patient was cleared by psych.  Patient received total 9 mg of Versed and was started on Precedex drip for his agitation.  Upon arrival [place] ICU-patient was easily agitated/ uncooperative.    Plan:  Appreciate psych recs  Currently on Seroquel 50mg BID with 200mg QHS  Replace Ativan with Zyprexa 5 mg -10 mg every 6 hours as needed for agitation/impulsivity  Lexapro 10 mg daily.  Restraint as needed.  One-to-one  QTc within normal.  Monitor on Tele  Routinely check EKG

## 2025-03-09 LAB
BACTERIA BLD CULT: NORMAL
BACTERIA BLD CULT: NORMAL

## 2025-03-09 NOTE — NURSING NOTE
Pt discharged to home via [facility]. IV's removed, discharge information reviewed with patient and patient's sister. All belongings sent ketan with patient. Vital signs stable, all questions answered

## 2025-03-10 ENCOUNTER — TELEPHONE (OUTPATIENT)
Age: 40
End: 2025-03-10

## 2025-03-10 NOTE — UTILIZATION REVIEW
NOTIFICATION OF ADMISSION DISCHARGE   This is a Notification of Discharge from [facility]. Please be advised that this patient has been discharge from our facility. Below you will find the admission and discharge date and time including the patient’s disposition.   UTILIZATION REVIEW CONTACT:  Jeanne Rodriguez  Utilization   Network Utilization Review Department  Phone: [phone] x carefully listen to the prompts. All voicemails are confidential.  Email: [email or web address]     ADMISSION INFORMATION  PRESENTATION DATE: 3/4/2025  7:45 AM  OBERVATION ADMISSION DATE: N/A  INPATIENT ADMISSION DATE: 3/4/25  7:45 AM   DISCHARGE DATE: 3/8/2025  8:00 PM   DISPOSITION:Home/Self Care    Network Utilization Review Department  ATTENTION: Please call with any questions or concerns to [phone] and carefully listen to the prompts so that you are directed to the right person. All voicemails are confidential.   For Discharge needs, contact Care Management DC Support Team at [phone] opt. 2  Send all requests for admission clinical reviews, approved or denied determinations and any other requests to dedicated fax number below belonging to the campus where the patient is receiving treatment. List of dedicated fax numbers for the Facilities:  FACILITY NAME UR FAX NUMBER   ADMISSION DENIALS (Administrative/Medical Necessity) [phone]   DISCHARGE SUPPORT TEAM ([facility]) [phone]   PARENT CHILD HEALTH (Maternity/NICU/Pediatrics) [phone]   [facility] [phone]   General acute hospital [phone]   [facility] [phone]   General acute hospital [phone]   formerly [facility] [phone]   [facility] [phone]   [facility] [phone]   [facility] [phone]   [facility]  [facility] [phone]   [facility] [phone]   [facility] [phone]   [facility] [phone]

## 2025-03-11 ENCOUNTER — TELEMEDICINE (OUTPATIENT)
Dept: PULMONOLOGY | Facility: CLINIC | Age: 40
End: 2025-03-11
Payer: COMMERCIAL

## 2025-03-11 ENCOUNTER — TRANSITIONAL CARE MANAGEMENT (OUTPATIENT)
Dept: INTERNAL MEDICINE CLINIC | Facility: CLINIC | Age: 40
End: 2025-03-11

## 2025-03-11 ENCOUNTER — TELEPHONE (OUTPATIENT)
Dept: INTERNAL MEDICINE CLINIC | Facility: CLINIC | Age: 40
End: 2025-03-11

## 2025-03-11 ENCOUNTER — TELEPHONE (OUTPATIENT)
Dept: PALLIATIVE MEDICINE | Facility: CLINIC | Age: 40
End: 2025-03-11

## 2025-03-11 DIAGNOSIS — G70.9 NEUROMUSCULAR DISORDER (HCC): ICD-10-CM

## 2025-03-11 DIAGNOSIS — Z99.11 VENTILATOR DEPENDENT (HCC): Primary | ICD-10-CM

## 2025-03-11 PROCEDURE — 99214 OFFICE O/P EST MOD 30 MIN: CPT | Performed by: STUDENT IN AN ORGANIZED HEALTH CARE EDUCATION/TRAINING PROGRAM

## 2025-03-11 NOTE — TELEPHONE ENCOUNTER
TCM call complete - spoke with Dmitry (pt's sister) - would like to schedule a virtual TCM as this would be easier - aware clerical will reach out to schedule    Pt was not given a PT/OT referral on discharge to continue care with [facility] - inquiring if a new referral can be put in so pt can continue PT/OT

## 2025-03-11 NOTE — PATIENT INSTRUCTIONS
It was a pleasure seeing you today!    Obtain fluoroscopic sniff test  Continue Duonebs up to 4 times a day  Use chest vest three times a day  Follow-up in 3 months with me uday Barksdale MD  Pulmonary and Critical Care Medicine       
show

## 2025-03-11 NOTE — PROGRESS NOTES
Virtual Regular Visit  Name: Hemanth Swanson      : 1985      MRN: [id number]  Encounter Provider: Yessy Barksdale MD  Encounter Date: 3/11/2025   Encounter department: [facility] PULMONARY [facility]    Verification of patient location:    Patient is located at Home in the following state in which I hold an active license [place]    Assessment & Plan       Encounter provider Yessy Barksdale MD    The patient was identified by name and date of birth. Hemanth Swanson was informed that this is a telemedicine visit and that the visit is being conducted through the Epic Embedded platform. He agrees to proceed..  My office door was closed. Other methods to assure confidentiality were taken such as ear phones. No one else was in the room.  He acknowledged consent and understanding of privacy and security of the video platform. The patient has agreed to participate and understands they can discontinue the visit at any time.    Patient is aware this is a billable service.     History of Present Illness     Hemanth Swanson is a 39 y.o. male who presents     Hemanth is on for a video visit.  The video visit was conducted with his sister.    Hemanth was recently hospitalized several times at [place] followed by [place] and then transferred to [place].  He had issues with tracheostomy dislodgment, PEG tube dislodgment.  He seems fully vent dependent at this point.  While he was in [facility] I spoke with his outpatient respiratory therapist and we adjusted his vent support and increase his respiratory rates due to his carbon dioxide retention.    I reviewed his images with him and his sister, he does have elevated diaphragm which is most likely from his neuromuscular weakness but we should further investigate paralyzed diaphragm and consider diaphragmatic pacer if he is a candidate for it.  She is agreeable to proceed with a fluoroscopic sniff test.    Regarding his secretion clearance Hemanth uses DuoNebs 4 times  daily and is awaiting vest therapy.  Orders have been placed for a new vest therapy through [facility].    Currently he was laying in bed flat and his sister had to elevate the head of the bed.  She says that he is able to slowly pivot with assistance to the end of the bed where he doing home physical therapy.  He is not ambulating at this point, using significant amounts of Seroquel that he was recently discharged on, he is on 3 times daily with 200 mg nightly.    Regarding his ventilator aside from the respiratory rate adjustments he is still fully vent dependent, I discussed routinely with his outpatient respiratory therapist through [facility].    He currently has a tracheostomy 5 Shiley in place.  Apparently the 6 Shiley causes him significant coughing      Plan  Obtain fluoroscopic sniff test to evaluate for paralyzed diaphragm  Continue full vent support  Continue with tracheostomy 5 Shiley  Orders placed for ventilator supplies/tracheostomy supplies  Continue DuoNebs 4 times daily  Continue vest therapy, awaiting through [facility]  Seroquel was ordered by primary service while in the hospital  Follow-up in 3 months virtually  If he has Pseudomonas infections again we might have to consider inhaled tobramycin.  I am holding off for now as he had an infection in June and then again in October        From my ICU attestation    39-year-old gentleman admitted to medical ICU due to respiratory failure, complex past medical history includes ventilator dependent respiratory failure due to an unknown neurologic condition, follows with me due to ventilator dependence, history of seminoma status post orchiectomy and chemotherapy that may have led to his neurologic condition, status post trach, PEG, PE on indefinite anticoagulation.     During his hospitalization he was admitted initially due to respiratory failure found to have left-sided consolidation along with hypernatremia.  Bronchoscopy with Pseudomonas,  Proteus treated with cefepime and Zosyn.  He has had several bronchoscopies during his hospitalization, still continues to have worsening white out on imaging.     Neurology consulted for possible seizure-like activity but they feel this is not seizures.  Cardiology consulted due to vagal arrhythmias/bradycardia, no further management.     Bronchoscopy this week showed significant mucous plugs.  BAL showing Pseudomonas, will not treat as ventilator acquired pneumonia as this is now colonization at this point. MRI cervical showed transverse myelitis.  LP with significantly elevated opening pressure. On IVIG and steroids         Assessment:  Transverse myelitis on MRI C spine, LP with no infection so far, treated with IVIG and Solu-Medrol 1 g, unsure if he was ever discharged on a prednisone taper  Tinea infection on his back, status post biopsy  Ventilator dependent respiratory failure with 6 cuffed distal XLT Shiley, complicated by recurrent pneumonias with Pseudomonas and Proteus treated with 2-week regimen of Zosyn along with cefepime prior to that.  As needed bronchoscopy, vest therapy. 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, cisplatin  Male hypogonadism secondary to treatment follows with urology, levels checked no replacement needed at this time  History of cardiac arrest secondary to vagal maneuver  Chronically incarcerated umbilical hernia     Plan:  Neuro: No sedatives  CV: Continue Eliquis   Resp: vest therapy, HOB > 30 degrees, continue with albuterol, Mucomyst,  GI: Tube feeds at  goal  FEN: Replace electrolytes as needed  : No Sheppard  Heme: Eliquis lifelong  ID: Hold antimicrobials, BAL is likely conization  Endo: Accu-Cheks PRN  MSK: Chair position bed  Lines: Peripheral, trach      Review of Systems   Constitutional:  Positive for activity change and appetite change.   HENT:  Positive for postnasal drip.    Eyes: Negative.    Respiratory:  Positive for cough and shortness of breath.    Cardiovascular: Negative.    Gastrointestinal: Negative.    Endocrine: Negative.    Genitourinary: Negative.    Musculoskeletal:  Positive for gait problem. Negative for back pain and joint swelling.   Skin: Negative.    Allergic/Immunologic: Positive for immunocompromised state.   Neurological:  Positive for seizures and speech difficulty.   Hematological: Negative.    Psychiatric/Behavioral: Negative.       Pertinent Medical History         Objective     There were no vitals taken for this visit.  Physical Exam  HENT:      Head: Normocephalic.      Nose: Nose normal.   Cardiovascular:      Rate and Rhythm: Normal rate.   Musculoskeletal:         General: Normal range of motion.      Cervical back: Normal range of motion.   Neurological:      General: No focal deficit present.      Mental Status: He is alert. Mental status is at baseline.   Psychiatric:         Mood and Affect: Mood normal.         Visit Time  Total Visit Duration: 36 minutes         HPI

## 2025-03-11 NOTE — TELEPHONE ENCOUNTER
Patient scheduled for 3/20 as virtual per Dr. Douglas. Unable to bill visit as TCM as patient had last TCM appointment on 2/27/25.

## 2025-03-18 ENCOUNTER — PATIENT MESSAGE (OUTPATIENT)
Dept: FAMILY MEDICINE CLINIC | Facility: CLINIC | Age: 40
End: 2025-03-18

## 2025-03-18 DIAGNOSIS — G70.9 NEUROMUSCULAR DISORDER (HCC): ICD-10-CM

## 2025-03-18 DIAGNOSIS — Z76.89 ENCOUNTER FOR WHEELCHAIR ASSESSMENT: ICD-10-CM

## 2025-03-18 DIAGNOSIS — Z74.09 IMPAIRED MOBILITY: Primary | ICD-10-CM

## 2025-03-18 DIAGNOSIS — G70.9 NEUROMUSCULAR WEAKNESS (HCC): ICD-10-CM

## 2025-03-18 DIAGNOSIS — G62.89 MIXED AXONAL-DEMYELINATING POLYNEUROPATHY: Chronic | ICD-10-CM

## 2025-03-20 ENCOUNTER — TELEPHONE (OUTPATIENT)
Dept: FAMILY MEDICINE CLINIC | Facility: CLINIC | Age: 40
End: 2025-03-20

## 2025-03-20 DIAGNOSIS — R45.1 AGITATION: ICD-10-CM

## 2025-03-20 DIAGNOSIS — F39 MOOD DISORDER (HCC): Primary | ICD-10-CM

## 2025-03-20 NOTE — TELEPHONE ENCOUNTER
The tano referral was from when he was inpatient- had a tele consult from inpatient psych on 3/6/25 but still needs outpatient follow up w/ psych to be set up.    I had a psych referral in from 1/15/25 which is now closed on the list, so I will place a new one.

## 2025-03-20 NOTE — TELEPHONE ENCOUNTER
Spoke with sister who declined TCM visit for today as she does not feel he needs to be seen since they just had a f/u with .  is in agreement, but wanted to remind them to reach out to outpatient psych for f/u due to increased agitation recently. Dmitry verbalized understanding. Looks like a referral was received by psych on 3/6/25. Pt was placed on a waiting list for [facility] psychiatrist. Will let pt's sister know and will follow up with PCP and psych regarding appt.

## 2025-03-21 NOTE — PATIENT COMMUNICATION
Called pt's sister to clarify message and see what exactly they need us to do. No answer. LM for call back.

## 2025-03-21 NOTE — PATIENT COMMUNICATION
Spoke with pt's sister. They need an order for a wheelchair and also a referral for a wheelchair evaluation. Pt's physical therapist told Dmitry that he could really use a brace for the L leg because he has tightening of the muscles in the L knee that is causing contracture. Looking for a brace that will keep the leg straight. Please advise.

## 2025-03-24 ENCOUNTER — OFFICE VISIT (OUTPATIENT)
Age: 40
End: 2025-03-24
Payer: COMMERCIAL

## 2025-03-24 VITALS
DIASTOLIC BLOOD PRESSURE: 74 MMHG | OXYGEN SATURATION: 99 % | RESPIRATION RATE: 16 BRPM | HEART RATE: 100 BPM | SYSTOLIC BLOOD PRESSURE: 128 MMHG

## 2025-03-24 DIAGNOSIS — Z51.5 PALLIATIVE CARE PATIENT: Primary | ICD-10-CM

## 2025-03-24 DIAGNOSIS — Z93.0 TRACHEOSTOMY IN PLACE (HCC): ICD-10-CM

## 2025-03-24 DIAGNOSIS — G62.89 MIXED AXONAL-DEMYELINATING POLYNEUROPATHY: Chronic | ICD-10-CM

## 2025-03-24 DIAGNOSIS — Z99.11 VENTILATOR DEPENDENT (HCC): Chronic | ICD-10-CM

## 2025-03-24 PROCEDURE — 99348 HOME/RES VST EST LOW MDM 30: CPT

## 2025-03-24 NOTE — ASSESSMENT & PLAN NOTE
Hemanth follows with palliative care for psychosocial support and symptom management of demyelinating polyneuropathy  Goals of Care- disease focused, to remain in the home.   Symptoms - Moods/behaviors better   ACP -  on file- Sister Dmitry is decision maker  Next visit - 4 weeks    Social support  Supportive listening provided  Normalized experience of patient/family  Provided anxiety containment  Provided anticipatory guidance  Patient lives with  Sister Dmitry  Nieces and nephews  Patient has 3 biological siblings- Kyle, Dmitry, and Stephanie  Mother- Steph  Father- .  Has 5 year old son  Was working as  prior     No safety concerns in the home.

## 2025-03-24 NOTE — PROGRESS NOTES
[facility] Visit: follow up   Name: Hemanth Swanson      : 1985      MRN: [id number]  Encounter Provider: AMBER Pugh  Encounter Date: 3/24/2025   Encounter department: [facility]    Assessment & Plan   1. Palliative care patient  Assessment & Plan:  Hemanth follows with palliative care for psychosocial support and symptom management of demyelinating polyneuropathy  Goals of Care- disease focused, to remain in the home.   Symptoms - Moods/behaviors better   ACP -  on file- Sister Dmitry is decision maker  Next visit - 4 weeks    Social support  Supportive listening provided  Normalized experience of patient/family  Provided anxiety containment  Provided anticipatory guidance  Patient lives with  Sister Dmitry  Nieces and nephews  Patient has 3 biological siblings- Kyle, Dmitry, and Stephanie  Mother- Steph  Father- .  Has 5 year old son  Was working as  prior     No safety concerns in the home.   Orders:  -     Ambulatory Referral to Speech Therapy; Future  2. Mixed axonal-demyelinating polyneuropathy  Assessment & Plan:  Follows with neurology    Orders:  -     Ambulatory Referral to Speech Therapy; Future  3. Ventilator dependent (HCC)  Assessment & Plan:  Patient and family wish to stay in the home  Family very capable of caring for patient.    Working with PT/OT and Resp. Therapist,.    Awaiting vest to complete vest therapy.   Orders:  -     Ambulatory Referral to Speech Therapy; Future  4. Tracheostomy in place (HCC)  -     Ambulatory Referral to Speech Therapy; Future      Subjective   Chief Complaint   Patient presents with    Follow-up        History of Present Illness     Hemanth Swanson is a 39 y.o. male  who presents in follow up of symptoms related to Mixed axonal-demyelinating polyneuropathy, vent dependent.  The patient is seen in their home due to ambulatory difficulties related to their advanced illness.   "Pertinent issues include: symptom management, assessment of goals of care, disease process education and discussion of prognosis, advance care plannin     Patient is seen in the home with sister/Dmitry and palliative CMOC present.    Patient has MMJ certification.  Patient has been asking to smoke, have a beer.   Billie has gotten him THC syrup from dispensary and found this to be helpful.    BM- regular  Tube feedings- 24hr continuous. 70ml/hr Jevity 1.5  Has PT coming.     Dmitry states that patient like to eat and would be interested in Speech eval to determine swallowing and also if patient could qualify for any type if assistive communication devices.     Goals discussed again- patient wants to remain in the home. Does not want to go to facility. Not interested in Hospice/comfort/discontinuation of vent support.   Discussed with Dmitry- as she reports having discussions with patient as well.                   Current Outpatient Medications:     acetylcysteine (MUCOMYST) 200 mg/mL nebulizer solution, Take 3 mL (600 mg total) by nebulization every 6 (six) hours, Disp: 360 mL, Rfl: 0    apixaban (ELIQUIS) 5 mg, 1 tablet (5 mg total) by Per PEG Tube route 2 (two) times a day, Disp: 60 tablet, Rfl: 2    escitalopram (LEXAPRO) 10 mg tablet, Take 1 tablet (10 mg total) by mouth daily, Disp: 30 tablet, Rfl: 0    Incontinence Supply Disposable (Comfort Shield Adult Diapers) MISC, Use 1 each 4 (four) times a day, Disp: 48 each, Rfl: 5    ipratropium-albuterol (DUO-NEB) 0.5-2.5 mg/3 mL nebulizer solution, Take 3 mL by nebulization every 6 (six) hours (Patient not taking: Reported on 3/11/2025), Disp: 360 mL, Rfl: 0    NEEDLE, DISP, 18 G 18G X 1\" MISC, Use 2 (two) times a week, Disp: 50 each, Rfl: 3    OLANZapine (ZyPREXA), Inject 10 mg into a muscle every 6 (six) hours as needed for agitation (Agitation) (Patient not taking: Reported on 3/11/2025), Disp: 1 each, Rfl: 0    Oral Hygiene Products (Toothette Swabs/Dentifrice) " "SWAB, Apply to the mouth or throat 3 (three) times a day, Disp: 1000 each, Rfl: 1    QUEtiapine (SEROquel) 200 mg tablet, 1 tablet (200 mg total) by Per G Tube route daily at bedtime, Disp: 30 tablet, Rfl: 0    QUEtiapine (SEROquel) 50 mg tablet, Take 1 tablet (50 mg total) by mouth 2 (two) times a day 50 mg per PEG tube twice daily, Disp: 60 tablet, Rfl: 0    Syringe/Needle, Disp, (Syringe Luer Slip) 25G X 5/8\" 1 ML MISC, Use 2 (two) times a week, Disp: 50 each, Rfl: 3    Objective   /74 (BP Location: Right arm, Cuff Size: Standard)   Pulse 100   Resp 16   SpO2 99%   Physical Exam  Vitals reviewed.   Constitutional:       General: He is awake. He is not in acute distress.     Comments: Trach and vent dependent   HENT:      Head: Atraumatic.   Eyes:      General:         Right eye: No discharge.         Left eye: No discharge.   Cardiovascular:      Rate and Rhythm: Normal rate.   Pulmonary:      Effort: Pulmonary effort is normal. No respiratory distress.      Breath sounds: Normal breath sounds.   Abdominal:      General: Bowel sounds are normal.   Musculoskeletal:      Cervical back: Normal range of motion.      Right lower leg: No edema.      Left lower leg: No edema.   Skin:     General: Skin is warm and dry.   Neurological:      Mental Status: Mental status is at baseline.   Psychiatric:         Mood and Affect: Mood normal.         Behavior: Behavior normal.          Recent labs:  Lab Results   Component Value Date/Time    SODIUM 139 03/07/2025 05:00 AM    SODIUM 136 12/19/2023 12:04 PM    K 4.5 03/07/2025 05:00 AM    K 3.9 12/19/2023 12:04 PM    BUN 12 03/07/2025 05:00 AM    BUN 7 12/19/2023 12:04 PM    CREATININE 0.51 (L) 03/07/2025 05:00 AM    CREATININE 0.68 12/19/2023 12:04 PM    GLUC 85 03/07/2025 05:00 AM    GLUC 86 12/19/2023 12:04 PM    CALCIUM 9.6 03/07/2025 05:00 AM    CALCIUM 9.6 12/19/2023 12:04 PM    AST 13 03/05/2025 05:08 AM    AST 25 12/19/2023 12:04 PM    ALT 13 03/05/2025 05:08 AM "    ALT 53 12/19/2023 12:04 PM    ALB 3.7 03/05/2025 05:08 AM    ALB 4.2 12/19/2023 12:04 PM    TP 7.1 03/05/2025 05:08 AM    TP 7.3 12/19/2023 12:04 PM    EGFR 137 03/07/2025 05:00 AM    EGFR 123 12/19/2023 12:04 PM     Lab Results   Component Value Date/Time    HGB 9.8 (L) 03/07/2025 04:24 AM    WBC 13.16 (H) 03/07/2025 04:24 AM     03/07/2025 04:24 AM    INR 1.08 03/04/2025 01:36 AM    PTT 29 03/04/2025 01:36 AM     Lab Results   Component Value Date/Time    YWK6PXLVJAPV 2.090 12/16/2024 01:57 AM       Recent Imaging:  Procedure: IR gastrostomy (G) tube check/change/reinsertion/upsize  Result Date: 3/7/2025  Narrative: IR GASTROSTOMY (G) TUBE CHECK/CHANGE/REINSERTION/UPSIZE Clinical History: clogged G-tube with possible dislodgement due to agitation Contrast: 10 mL of iohexol Sedation time: N/A Procedure: Existing 16 Liberian gastrostomy was prepped and draped in the usual sterile fashion. The existing catheter was removed after aspirating the balloon. A new 16 Liberian catheter was inserted. The retention balloon was filled with 6 cc of normal saline. Contrast was injected and an abdominal film obtained confirming proper positioning.     Impression: Impression: Bedside gastrostomy exchange Workstation performed: [id number]     Procedure: XR abdomen 1 vw portable  Result Date: 3/7/2025  Narrative: XR ABDOMEN 1 VW PORTABLE INDICATION: G-tube change. Confirm position.. COMPARISON: 2/13/2025 FINDINGS: Gastrostomy tube is in appropriate position within the stomach. Contrast is in the lumen. Nonobstructive bowel gas pattern. No evidence of pneumoperitoneum on this supine study. Upright or left lateral decubitus imaging is more sensitive to detect subtle free air in the appropriate setting. No pathologic calcification or soft tissue mass. Clear lung bases. Bones are unremarkable for the patient's age.     Impression: Gastrostomy tube within the stomach. Workstation performed: [id number]     Procedure: XR chest  portable ICU  Result Date: 3/5/2025  Narrative: XR CHEST PORTABLE ICU INDICATION: fever. COMPARISON: Radiograph 3/4/2025 FINDINGS: Small to moderate left greater than right layering pleural effusions and bibasilar consolidations. These findings are suboptimally assessed due to low lung volumes but appear grossly unchanged no visible pneumothorax. Normal cardiomediastinal silhouette. A right IJ central venous port terminates overlying the right atrium. A tracheostomy tube is present, with the tip terminating approximately 7 cm above the prashant. Bones are unremarkable for age. Normal upper abdomen.     Impression: No convincing interval change in the layering pleural effusions and bibasilar consolidations, which may represent atelectasis or pneumonia. Workstation performed: [id number]     Procedure: XR chest 1 view portable  Result Date: 3/4/2025  Narrative: XR CHEST PORTABLE INDICATION: AMS. COMPARISON: February 15, 2025 FINDINGS: Right-sided line seen with tip in the right atrium Bibasilar densities seen. Small left effusion Normal cardiomediastinal silhouette. Bones are unremarkable for age. Normal upper abdomen.     Impression: No congestion Bibasilar densities seen which may be due to atelectasis/consolidation, unchanged No new consolidation Workstation performed: [id number]     Procedure: XR chest 1 view portable  Result Date: 2/16/2025  Narrative: XR CHEST PORTABLE INDICATION: infectious w/u. COMPARISON: Chest radiograph 2/13/2025 FINDINGS: Right chest port in stable position. External leads project over the chest. Tracheostomy tube in the trachea. Low lung volumes with diffuse hazy opacities. Bibasilar opacities. Probable small effusions. Unchanged cardiomediastinal silhouette. Bones are unremarkable for age. Normal upper abdomen.     Impression: Low lung volumes. Diffuse lung opacities may represent edema versus pneumonia. Small bilateral pleural effusions. Workstation performed: [id number]     Procedure: XR  abdomen 1 view kub  Result Date: 2/14/2025  Narrative: XR ABDOMEN 1 VIEW KUB INDICATION: s/p PEG change. COMPARISON: 2/13/2025 FINDINGS: There is contrast within the stomach and proximal duodenum. No extraluminal contrast. Nonobstructive bowel gas pattern. No evidence of pneumoperitoneum on this supine study. Upright or left lateral decubitus imaging is more sensitive to detect subtle free air in the appropriate setting. No pathologic calcification or soft tissue mass. Clear lung bases. Bones are unremarkable for the patient's age.     Impression: Contrast within the stomach and duodenum. No extraluminal contrast. Workstation performed: [id number]     Procedure: XR abdomen 1 view kub  Result Date: 2/14/2025  Narrative: XR ABDOMEN 1 VIEW KUB INDICATION: s/p PEG change. COMPARISON: 2/9/2025 FINDINGS: Note that the left abdomen was not completely included on the study. There is a PEG tube overlying the gastric bubble. Nonobstructive bowel gas pattern. No evidence of pneumoperitoneum on this supine study. Upright or left lateral decubitus imaging is more sensitive to detect subtle free air in the appropriate setting. No pathologic calcification or soft tissue mass. Clear lung bases. Bones are unremarkable for the patient's age.     Impression: PEG tube overlying the gastric bubble. Workstation performed: [id number]     Procedure: XR chest 1 view portable  Result Date: 2/14/2025  Narrative: XR CHEST PORTABLE INDICATION: ams. COMPARISON: 12/23/2024 FINDINGS: Again seen are a tracheostomy and Port-A-Cath on the right. There is bibasilar opacity, likely a combination of atelectasis and possibly pleural fluid. Evaluation of airspace disease is limited due to poor inspiratory effort though congestion/edema is not excluded. Obscured cardiac silhouette. There is an old right clavicle fracture. Normal upper abdomen.     Impression: Limited by poor inspiratory effort with bibasilar atelectasis and possible pleural  effusions. Vascular congestion/edema not excluded. Workstation performed: [id number]     Procedure: XR abdomen 1 view kub  Result Date: 2/10/2025  Narrative: XR ABDOMEN 1 VIEW KUB INDICATION: need contrast for tube placement please. COMPARISON: 12/10/2024. FINDINGS: Contrast is identified within the stomach lumen. Nonobstructive bowel gas pattern. No evidence of pneumoperitoneum on this supine study. Upright or left lateral decubitus imaging is more sensitive to detect subtle free air in the appropriate setting. No pathologic calcification or soft tissue mass. Clear lung bases. Bones are unremarkable for the patient's age.     Impression: No extraluminal contrast identified. Workstation performed: [id number]     Procedure: FL barium swallow video w speech  Result Date: 12/26/2024  Narrative: A video barium swallow study was performed by the Department of Speech Pathology. Please refer to the report for the official interpretation. The images are stored for archival purposes only. Study images were not formally reviewed by the Radiology Department.    Procedure: XR chest portable  Result Date: 12/23/2024  Narrative: XR CHEST PORTABLE INDICATION: VDRF. COMPARISON: 12/20/2024 FINDINGS: Tracheotomy tube positioning unchanged. Central line tip in mid right atrium. Poor inspiration with bilateral pleural effusions similar to prior study. Probable underlying airspace disease bilaterally is again noted without obvious change from prior study. No pneumothorax. The cardiac silhouette is without change from the prior study. There is an old fracture of the right clavicle     Impression: No significant interval change since 12/20/2024 Workstation performed: [id number]     Procedure: XR chest portable  Result Date: 12/20/2024  Narrative: XR CHEST PORTABLE INDICATION: hypoxia. COMPARISON: Compared with 12/19/2024 FINDINGS: Tracheostomy tube and right chest wall pacemaker unchanged. Poor inspiratory effort. Prominent vascular  markings. No pneumothorax. Haziness obscuring hemidiaphragms and costophrenic angles. Normal cardiomediastinal silhouette. Bones are unremarkable for age. Normal upper abdomen.     Impression: Poor inspiration. Congestive changes and effusion suggested.. Workstation performed: [id number]     Procedure: XR chest portable  Result Date: 12/19/2024  Narrative: XR CHEST PORTABLE INDICATION: pneumonia. COMPARISON: 12/18/2024 FINDINGS: Unchanged lines and tubes. Low lung volumes, which causes crowding of bronchovascular markings.  Within that limitation, there is persistent left lower lobe atelectasis with subsegmental right lower lobe atelectasis. Overall appearance is similar to the prior study. No pneumothorax. Small bilateral pleural effusions. Normal cardiomediastinal silhouette. Bones are unremarkable for age. Normal upper abdomen.     Impression: No significant interval change. Workstation performed: [id number]     Procedure: XR chest portable ICU  Result Date: 12/18/2024  Narrative: XR CHEST PORTABLE ICU INDICATION: hypoxia, mucus plugging. COMPARISON: 12/15/2024 FINDINGS: Right-sided infusion port catheter device and tracheostomy appear stable. Artifacts project over the chest and multiple locations. Low lung volumes. Platelike atelectasis in the left lateral midlung field. Lungs otherwise grossly clear. No pneumothorax or pleural effusion. Normal cardiomediastinal silhouette. Bones are unremarkable for age. Normal upper abdomen.     Impression: Platelike atelectasis left midlung field. Stable line and tube positioning. Low lung volumes. Workstation performed: [id number]     Procedure: XR chest portable  Result Date: 12/16/2024  Narrative: XR CHEST PORTABLE INDICATION: AMS, agitation. COMPARISON: Chest radiograph October 31, 2024 FINDINGS: Tracheostomy tube in situ. Tip of right chest wall port projects over the right atrium. Veiled bilateral lower lung opacities, left greater than right No pneumothorax. Normal  cardiomediastinal silhouette. Bones are unremarkable for age. Normal upper abdomen.     Impression: Complete left lower lobe atelectasis and segmental right lower lobe atelectasis. Workstation performed: [id number]     Procedure: CT head without contrast  Result Date: 12/16/2024  Narrative: CT BRAIN - WITHOUT CONTRAST INDICATION:   Sepsis, altered mental status, chronically ill with trach and PEG tube. COMPARISON: CT of the head on October 9, 2024. TECHNIQUE:  CT examination of the brain was performed.  Multiplanar 2D reformatted images were created from the source data. Radiation dose length product (DLP) for this visit:  1038.6 mGy-cm .  This examination, like all CT scans performed in the [facility], was performed utilizing techniques to minimize radiation dose exposure, including the use of iterative  reconstruction and automated exposure control. IMAGE QUALITY:  Diagnostic. FINDINGS: PARENCHYMA:No intracranial mass, mass effect or midline shift. No CT signs of acute infarction.  No acute parenchymal hemorrhage. VENTRICLES AND EXTRA-AXIAL SPACES:  Normal for the patient's age. VISUALIZED ORBITS:  Normal visualized orbits. PARANASAL SINUSES:  Normal visualized paranasal sinuses. CALVARIUM AND EXTRACRANIAL SOFT TISSUES:  Normal.     Impression: No acute intracranial hemorrhage, midline shift, or mass effect. Workstation performed: [id number]     Procedure: CT chest abdomen pelvis w contrast  Result Date: 12/16/2024  Narrative: CT CHEST, ABDOMEN AND PELVIS WITH IV CONTRAST INDICATION: Sepsis, altered mental status, chronically ill with trach and PEG tube. COMPARISON: CT of the chest abdomen pelvis October 11, 2024. TECHNIQUE: CT examination of the chest, abdomen and pelvis was performed. Multiplanar 2D reformatted images were created from the source data. This examination, like all CT scans performed in the [facility], was performed utilizing techniques to minimize radiation dose  exposure, including the use of iterative reconstruction and automated exposure control. Radiation dose length product (DLP) for this visit: 912 mGy-cm IV Contrast: 100 mL of iohexol (OMNIPAQUE) Enteric Contrast: Not administered. FINDINGS: CHEST LUNGS: Complete collapse of the left lower lobe. Severe atelectasis of the right lower lobe. There are secretions within the distal trachea and mainstem bronchi. Superimposed aspiration or pneumonia cannot be excluded. Tracheostomy tube in place. PLEURA: Trace left pleural effusion. HEART/GREAT VESSELS: Right-sided Port-A-Cath with its tip in the right atrium. Heart is unremarkable for patient's age. No thoracic aortic aneurysm. MEDIASTINUM AND NIMA: Unremarkable. CHEST WALL AND LOWER NECK: Unremarkable. ABDOMEN LIVER/BILIARY TREE: Unremarkable. GALLBLADDER: No calcified gallstones. No pericholecystic inflammatory change. SPLEEN: Unremarkable. PANCREAS: Unremarkable. ADRENAL GLANDS: Unremarkable. KIDNEYS/URETERS: 2 mm calculus in the distal right ureter just prior to the ureterovesicular junction (series 301, image 249). No significant hydronephrosis. STOMACH AND BOWEL: G-tube within the stomach. No bowel obstruction. APPENDIX: The appendix is normal in caliber without evidence of appendicitis. High density material within the appendix may be due to appendicoliths. ABDOMINOPELVIC CAVITY: No ascites. No pneumoperitoneum. No lymphadenopathy. VESSELS: Unremarkable for patient's age. PELVIS REPRODUCTIVE ORGANS: Unremarkable for patient's age. URINARY BLADDER: Sheppard catheter within the bladder. Small amount of gas within the bladder likely due to catheterization. There is mild thickening of the urinary bladder wall. ABDOMINAL WALL/INGUINAL REGIONS: Small fat-containing bilateral inguinal hernias. Moderate fat-containing umbilical hernia. BONES: No acute fracture or suspicious osseous lesion. Old right clavicle deformity. Serpiginous sclerosis within the femoral heads compatible  "with avascular necrosis. No evidence of subchondral collapse. Stable mild compression deformities of T8, T9, and T11-L2.     Impression: 1.  Complete collapse of the left lower lobe and severe atelectasis of the right lower lobe similar to prior examination. Superimposed aspiration or pneumonia cannot be excluded. Trace left pleural effusion. 2.  2 mm calculus in the distal right ureter just prior to the ureterovesicular junction. No significant hydronephrosis. 3.  Mild thickening of the urinary bladder wall, correlate with urinalysis for cystitis. Workstation performed: [id number]     Procedure: XR abdomen 1 view kub  Result Date: 12/10/2024  Narrative: XR ABDOMEN 1 VIEW KUB INDICATION: PEG tube replacement. COMPARISON: None FINDINGS: Percutaneous gastrostomy tube is present and administered contrast opacifies the stomach. No dilated loops of bowel. Moderate burden of stool throughout the colon. Imaged lung bases are clear. No acute osseous findings.     Impression: Percutaneous gastrostomy tube in the stomach. Workstation performed: [id number]       32 minutes total time spent on 3/24/2025 in caring for this patient including obtaining/reviewing history, symptom assessment and management, medication review / adjustment, psychosocial support, reviewing / ordering tests, medicines, imaging, procedures, goals of care, supportive listening, anticipatory guidance, patient/family education, instructions for management, importance of adhering to treatment plan, risks/benefits of treatment(s), risk factor reduction, and documenting in the medical record. All of the patient's questions were answered during this discussion.    AMBER Pugh  [facility]  [phone]    Portions of this document may have been created using dictation software and as such some \"sound alike\" terms may have been generated by the system. Do not hesitate to contact me with any questions or " clarifications.

## 2025-03-26 ENCOUNTER — TELEPHONE (OUTPATIENT)
Age: 40
End: 2025-03-26

## 2025-03-26 DIAGNOSIS — G62.89 MIXED AXONAL-DEMYELINATING POLYNEUROPATHY: Primary | Chronic | ICD-10-CM

## 2025-03-26 DIAGNOSIS — Z74.09 IMPAIRED MOBILITY: ICD-10-CM

## 2025-03-26 DIAGNOSIS — Z99.11 VENTILATOR DEPENDENT (HCC): Chronic | ICD-10-CM

## 2025-03-26 NOTE — TELEPHONE ENCOUNTER
Please call [employer]-  At visit on Monday patients sister Dmitry needs a script so they come to the home to measure patient for a wheelchair. Dmitry aware Dr. Douglas placed referral to [facility] wheelchair clinic, Billie doesn't have a way to get the patient there at this time.     Please find out exactly what they need so this process can be made smooth for patient.    #[phone]  Fax# [phone]

## 2025-03-26 NOTE — TELEPHONE ENCOUNTER
"Called [employer] regarding the process of ordering a wheelchair for Hemanth.  To open the order, his PCP needs to fax a script stating:    \"Rx for power chair\".  Please include patient demographics, insurance information, and progress notes with the script.  The [facility] will reach out to the family regarding setting up an in home visit.    The [place] office phone number is [phone].     And the fax number is [phone].    The numbers listed in Bushra's note are for the [place] location.      " Patient updated on ABX being sent to pharmacy. No further follow up needed at this time.

## 2025-03-26 NOTE — TELEPHONE ENCOUNTER
Dr. Douglas are you able to write the script for powered wheelchair for patient?    I had visit with patient on Monday and spoke with sister Dmitry- aware that you had placed a referral to wheelchair clinic but she is unable to get patient out of the home. She states that she checked with insurance and [employer] will come to the home to measure patient for a wheelchair.     Please let me know how I can assist?

## 2025-03-27 ENCOUNTER — TELEPHONE (OUTPATIENT)
Dept: NEUROLOGY | Facility: CLINIC | Age: 40
End: 2025-03-27

## 2025-03-27 NOTE — TELEPHONE ENCOUNTER
I called patient contact [facility] that patient appointment has been change to virtual.

## 2025-04-05 ENCOUNTER — APPOINTMENT (EMERGENCY)
Dept: RADIOLOGY | Facility: HOSPITAL | Age: 40
End: 2025-04-05
Payer: COMMERCIAL

## 2025-04-05 ENCOUNTER — HOSPITAL ENCOUNTER (EMERGENCY)
Facility: HOSPITAL | Age: 40
Discharge: HOME | End: 2025-04-05
Attending: EMERGENCY MEDICINE
Payer: COMMERCIAL

## 2025-04-05 VITALS
SYSTOLIC BLOOD PRESSURE: 142 MMHG | RESPIRATION RATE: 16 BRPM | DIASTOLIC BLOOD PRESSURE: 85 MMHG | OXYGEN SATURATION: 95 % | HEART RATE: 80 BPM | TEMPERATURE: 97.5 F

## 2025-04-05 DIAGNOSIS — T85.528A DISLODGED GASTROSTOMY TUBE: Primary | ICD-10-CM

## 2025-04-05 DIAGNOSIS — R45.1 AGITATION: ICD-10-CM

## 2025-04-05 PROCEDURE — 99284 EMERGENCY DEPT VISIT MOD MDM: CPT | Performed by: EMERGENCY MEDICINE

## 2025-04-05 PROCEDURE — 99283 EMERGENCY DEPT VISIT LOW MDM: CPT

## 2025-04-05 PROCEDURE — 43762 RPLC GTUBE NO REVJ TRC: CPT | Performed by: EMERGENCY MEDICINE

## 2025-04-05 PROCEDURE — 74018 RADEX ABDOMEN 1 VIEW: CPT

## 2025-04-05 PROCEDURE — 96372 THER/PROPH/DIAG INJ SC/IM: CPT

## 2025-04-05 RX ORDER — OLANZAPINE 10 MG/2ML
10 INJECTION, POWDER, FOR SOLUTION INTRAMUSCULAR ONCE
Status: COMPLETED | OUTPATIENT
Start: 2025-04-05 | End: 2025-04-05

## 2025-04-05 RX ORDER — QUETIAPINE FUMARATE 50 MG/1
50 TABLET, FILM COATED ORAL 2 TIMES DAILY
Qty: 60 TABLET | Refills: 0 | Status: CANCELLED | OUTPATIENT
Start: 2025-04-05

## 2025-04-05 RX ADMIN — OLANZAPINE 10 MG: 10 INJECTION, POWDER, FOR SOLUTION INTRAMUSCULAR at 21:55

## 2025-04-05 RX ADMIN — IOHEXOL 50 ML: 240 INJECTION, SOLUTION INTRATHECAL; INTRAVASCULAR; INTRAVENOUS; ORAL at 21:31

## 2025-04-06 NOTE — RESPIRATORY THERAPY NOTE
04/05/25 2100   Respiratory Assessment   Resp Comments Patient came into the hospital with his own vent, patient trach ties changed due to soiled, patient is on a VOCSN vent with the following setting 16/500/21%/ +8 I-time 1.2 flow trigger 3.0, O2 bleed 6l, patient is stating 96%    O2 Device vent

## 2025-04-06 NOTE — ED NOTES
Patient sister leaving the bedside. She states she will return when there is transportation time back to their home in order to accompany the patient. Phone number in chart.      Stu Torres RN  04/05/25 3289

## 2025-04-06 NOTE — DISCHARGE INSTRUCTIONS
Follow-up with gastroenterology or interventional radiology for upsizing of your PEG tube.    Come back for new or worsening symptoms.

## 2025-04-06 NOTE — ED PROVIDER NOTES
Time reflects when diagnosis was documented in both MDM as applicable and the Disposition within this note       Time User Action Codes Description Comment    4/5/2025  9:31 PM Sera Jose M Add [T85.528A] Dislodged gastrostomy tube           ED Disposition       ED Disposition   Discharge    Condition   Stable    Date/Time   Sat Apr 5, 2025  9:31 PM    Comment   Hemanth Swanson discharge to home/self care.                   Assessment & Plan       Medical Decision Making  Patient presenting after dislodgment of his PEG tube.  On exam he is not hypoxic.  His vitals are within normal limits other than him being slightly hypertensive and slightly tachypneic.  He has normal breath sounds.  Has a dislodged PEG tube with a nearly closed track.    Counseled family on the risk of a false track or placement of the tube to somewhere where it should be.  They expressed understanding this and consented to proceeding with the procedure.    I serially dilated the tract with 4 different Sheppard's before placing a 14 French PEG tube.  Was not able to get to a 16 French which was what the patient previously had.  5 mL of fluid placed into the balloon.    Will recommend follow-up with IR or gastroenterology for upsizing as needed.    Placement of tube confirmed with KUB.  Gastric contents were able to be extracted from the tube.    Problems Addressed:  Dislodged gastrostomy tube: acute illness or injury    Amount and/or Complexity of Data Reviewed  Radiology: ordered and independent interpretation performed.    Risk  Prescription drug management.             Medications   iohexol (OMNIPAQUE) 240 MG/ML solution 50 mL (50 mL Other Given 4/5/25 2131)   OLANZapine (ZyPREXA) IM injection 10 mg (10 mg Intramuscular Given 4/5/25 2155)       ED Risk Strat Scores                            SBIRT 20yo+      Flowsheet Row Most Recent Value   Initial Alcohol Screen: US AUDIT-C     1. How often do you have a drink containing alcohol? 0 Filed  at: 04/05/2025 2105   2. How many drinks containing alcohol do you have on a typical day you are drinking?  0 Filed at: 04/05/2025 2105   3a. Male UNDER 65: How often do you have five or more drinks on one occasion? 0 Filed at: 04/05/2025 2105   Audit-C Score 0 Filed at: 04/05/2025 2105   JEAN MARIE: How many times in the past year have you...    Used an illegal drug or used a prescription medication for non-medical reasons? Never Filed at: 04/05/2025 2105                            History of Present Illness       Chief Complaint   Patient presents with    Feeding Tube Problem     Pt brought in via EMS due to patient pulling out his feeding tube.        Past Medical History:   Diagnosis Date    Anxiety     Bradycardia 06/01/2024    Cancer (HCC)     Depression     Hypernatremia 10/06/2024    Migration of percutaneous endoscopic gastrostomy (PEG) tube (HCC) 09/24/2023    Psychiatric disorder     bipolar    Sepsis (HCC) 08/24/2023      Past Surgical History:   Procedure Laterality Date    IR BIOPSY LYMPH NODE  01/20/2023    IR GASTROSTOMY (G) TUBE CHECK/CHANGE/REINSERTION/UPSIZE  06/18/2024    IR GASTROSTOMY (G) TUBE CHECK/CHANGE/REINSERTION/UPSIZE  3/7/2025    IR GASTROSTOMY TUBE PLACEMENT  09/25/2023    IR LUMBAR PUNCTURE  09/06/2023    IR LUMBAR PUNCTURE  10/25/2024    IR PORT PLACEMENT  03/14/2023    ORCHIECTOMY Left 12/14/2022    Procedure: ORCHIECTOMY INGUINAL;  Surgeon: Flip Michael MD;  Location:  MAIN OR;  Service: Urology    PEG W/TRACHEOSTOMY PLACEMENT N/A 09/08/2023    Procedure: TRACHEOSTOMY WITH INSERTION PEG TUBE;  Surgeon: Rudy Mcmanus MD;  Location: [facility];  Service: General    TESTICLE SURGERY        Family History   Problem Relation Age of Onset    Coronary artery disease Maternal Aunt     Cancer Father     Diabetes Father     Hypertension Father       Social History     Tobacco Use    Smoking status: Former     Current packs/day: 0.00     Average packs/day: 0.7 packs/day for 22.7 years  "(15.0 ttl pk-yrs)     Types: Cigarettes     Start date: 2008     Quit date: 2023     Years since quittin.8     Passive exposure: Never    Smokeless tobacco: Never   Vaping Use    Vaping status: Former    Start date: 2018    Quit date: 2023    Substances: Nicotine, THC   Substance Use Topics    Alcohol use: Not Currently     Comment: 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 Not currently using crack cocaine.      E-Cigarette/Vaping    E-Cigarette Use Former User     Start Date 20     Quit Date 23       E-Cigarette/Vaping Substances    Nicotine Yes     THC Yes     CBD No     Flavoring No     Other No     Unknown No       I have reviewed and agree with the history as documented.     39-year-old male history of demyelinating polyneuropathy, trach, PEG tube, PE on Eliquis presents for dislodged PEG tube.  PEG tube was removed approximately an hour prior to arrival in the emergency department.    Review of records reveals that this tube was exchanged a month ago.  I asked the patient's niece who helps care for the patient and she states that she believes it went through the same tract as a previous PEG tube.    Patient is unable to give history other than to mouth words and flip me off.    Family notes that he has been slightly more agitated over the last 2 nights and his Seroquel seems to be less effective.          Feeding Tube Problem      Review of Systems   Unable to perform ROS: Other (Tracheostomy)           Objective       ED Triage Vitals   Temperature Pulse Blood Pressure Respirations SpO2 Patient Position - Orthostatic VS   25 2200   97.5 °F (36.4 °C) 100 (!) 157/106 (!) 24 95 % Lying      Temp Source Heart Rate Source BP Location FiO2 (%) Pain Score    25 -- --    Oral " Monitor Left arm        Vitals      Date and Time Temp Pulse SpO2 Resp BP Pain Score FACES Pain Rating User   04/05/25 2215 -- 100 94 % 20 -- -- -- NORMA   04/05/25 2200 -- 96 96 % 20 142/91 -- -- NORMA   04/05/25 2059 97.5 °F (36.4 °C) 100 pt refusing 95 % 24 157/106 -- -- CW            Physical Exam  Vitals and nursing note reviewed.   Constitutional:       General: He is not in acute distress.     Appearance: He is well-developed. He is not diaphoretic.   HENT:      Head: Normocephalic and atraumatic.      Right Ear: External ear normal.      Left Ear: External ear normal.   Eyes:      Conjunctiva/sclera: Conjunctivae normal.   Neck:      Trachea: No tracheal deviation.      Comments: Tracheostomy present.  Cardiovascular:      Rate and Rhythm: Normal rate and regular rhythm.      Heart sounds: Normal heart sounds. No murmur heard.  Pulmonary:      Effort: No respiratory distress.      Breath sounds: Normal breath sounds. No stridor. No wheezing or rales.      Comments: Vented  Abdominal:      General: Bowel sounds are normal. There is no distension.      Palpations: Abdomen is soft. There is no mass.      Tenderness: There is no abdominal tenderness. There is no guarding or rebound.      Comments: G tube absent. Tract is nearly occluded at this point.   Musculoskeletal:         General: No tenderness or deformity.   Skin:     General: Skin is dry.      Findings: No rash.   Neurological:      Motor: No abnormal muscle tone.      Coordination: Coordination normal.         Results Reviewed       None            XR abdomen 1 view kub   ED Interpretation by Jose M Zamora DO (04/05 2134)   G-tube appears to be in appropriate location.  Contrast noted within the small intestine lumen.          Feeding Tube    Date/Time: 4/5/2025 9:29 PM    Performed by: Jose M Zamora DO  Authorized by: Jose M Zamora DO  Universal Protocol:  procedure performed by consultantConsent: Verbal consent obtained.  Risks and benefits:  "risks, benefits and alternatives were discussed  Consent given by: drea.  Patient identity confirmed: verbally with patient    Patient location:  ED  Pre-procedure details:     Old tube type:  Gastrojejunostomy    Old tube size:  16 Fr  Indications:     Indications: tube dislodged    Anesthesia (see MAR for exact dosages):     Anesthesia method:  None  Procedure details:     Patient position:  Recumbent    Procedure type:  Replacement    Tube size:  14 Fr    Bulb inflation volume:  5    Bulb inflation fluid:  Normal saline  Post-procedure details:     Placement/position confirmation:  Gastric contents aspirated and x-ray    Placement difficulty:  Extreme (upsized with 4 puga catheters over 30 minutes with serial dilation)    Bleeding:  Minimal    Patient tolerance of procedure:  Tolerated with difficulty      ED Medication and Procedure Management   Prior to Admission Medications   Prescriptions Last Dose Informant Patient Reported? Taking?   Incontinence Supply Disposable (Comfort Shield Adult Diapers) MISC  Family Member No No   Sig: Use 1 each 4 (four) times a day   NEEDLE, DISP, 18 G 18G X 1\" MISC  Family Member No No   Sig: Use 2 (two) times a week   OLANZapine (ZyPREXA)  Family Member No No   Sig: Inject 10 mg into a muscle every 6 (six) hours as needed for agitation (Agitation)   Patient not taking: Reported on 3/11/2025   Oral Hygiene Products (Toothette Swabs/Dentifrice) SWAB  Family Member No No   Sig: Apply to the mouth or throat 3 (three) times a day   QUEtiapine (SEROquel) 200 mg tablet  Family Member No No   Si tablet (200 mg total) by Per G Tube route daily at bedtime   QUEtiapine (SEROquel) 50 mg tablet  Family Member No No   Sig: Take 1 tablet (50 mg total) by mouth 2 (two) times a day 50 mg per PEG tube twice daily   Syringe/Needle, Disp, (Syringe Luer Slip) 25G X 5/8\" 1 ML MISC  Family Member No No   Sig: Use 2 (two) times a week   acetylcysteine (MUCOMYST) 200 mg/mL nebulizer solution  " Family Member No No   Sig: Take 3 mL (600 mg total) by nebulization every 6 (six) hours   apixaban (ELIQUIS) 5 mg  Family Member No No   Si tablet (5 mg total) by Per PEG Tube route 2 (two) times a day   escitalopram (LEXAPRO) 10 mg tablet  Family Member No No   Sig: Take 1 tablet (10 mg total) by mouth daily   ipratropium-albuterol (DUO-NEB) 0.5-2.5 mg/3 mL nebulizer solution  Family Member No No   Sig: Take 3 mL by nebulization every 6 (six) hours   Patient not taking: Reported on 3/11/2025      Facility-Administered Medications: None     Patient's Medications   Discharge Prescriptions    No medications on file     No discharge procedures on file.  ED SEPSIS DOCUMENTATION   Time reflects when diagnosis was documented in both MDM as applicable and the Disposition within this note       Time User Action Codes Description Comment    2025  9:31 PM Jose M Zamora Add [T85.528A] Dislodged gastrostomy tube                  Jose M Zamora,   25 [phone]       Jose M Zamora,   25 [id number]

## 2025-04-06 NOTE — ED NOTES
Patient repeatedly attempting to remove trach and new feeding tube. Abdominal binder used to help keep feeding tube in place.      Stu Torres RN  04/05/25 7020

## 2025-04-07 ENCOUNTER — TELEPHONE (OUTPATIENT)
Dept: FAMILY MEDICINE CLINIC | Facility: CLINIC | Age: 40
End: 2025-04-07

## 2025-04-07 DIAGNOSIS — R45.1 AGITATION: ICD-10-CM

## 2025-04-07 DIAGNOSIS — F39 MOOD DISORDER (HCC): ICD-10-CM

## 2025-04-07 RX ORDER — QUETIAPINE FUMARATE 200 MG/1
200 TABLET, FILM COATED ORAL
Qty: 30 TABLET | Refills: 0 | Status: CANCELLED | OUTPATIENT
Start: 2025-04-07

## 2025-04-07 RX ORDER — QUETIAPINE FUMARATE 200 MG/1
200 TABLET, FILM COATED ORAL
Qty: 30 TABLET | Refills: 0 | Status: SHIPPED | OUTPATIENT
Start: 2025-04-07 | End: 2025-04-15

## 2025-04-07 RX ORDER — QUETIAPINE FUMARATE 50 MG/1
50 TABLET, FILM COATED ORAL 2 TIMES DAILY
Qty: 60 TABLET | Refills: 0 | Status: SHIPPED | OUTPATIENT
Start: 2025-04-07 | End: 2025-04-10 | Stop reason: SDUPTHER

## 2025-04-07 NOTE — TELEPHONE ENCOUNTER
Sister Dmitry, states that patient is taking olanzapine 10mg once a day.   Patient is on a wait list for psychiatry.     is requesting a refill until patient is able to get in to see psychiatry.   [facility]/pharmacy #[id number] - [place] - [address]

## 2025-04-07 NOTE — TELEPHONE ENCOUNTER
Olanzapine injection was ordered by psychiatry to use in hospital.  He uses seroquel at home. I sent in the seroquel refills for him.    He has needed to see psych multiple times during hospital admissions and has had referrals placed previously.    Not sure if he can be expedited for psych follow up??- he continues with agitation- pulled out feeding tube again and was in the ER this weekend.

## 2025-04-07 NOTE — TELEPHONE ENCOUNTER
Medication: Seroquel 200mg     Dose/Frequency: Take 200mg per G Tube daily at bedtime     Quantity: 30    Pharmacy: [facility]     Office:   [] PCP/Provider -   [x] Speciality/Provider - Last ordered by inpatient provider    Does the patient have enough for 3 days?   [x] Yes   [] No - Send as HP to POD

## 2025-04-08 ENCOUNTER — HOSPITAL ENCOUNTER (EMERGENCY)
Facility: HOSPITAL | Age: 40
Discharge: HOME | End: 2025-04-08
Attending: INTERNAL MEDICINE
Payer: COMMERCIAL

## 2025-04-08 ENCOUNTER — APPOINTMENT (EMERGENCY)
Dept: RADIOLOGY | Facility: HOSPITAL | Age: 40
End: 2025-04-08
Payer: COMMERCIAL

## 2025-04-08 VITALS
TEMPERATURE: 97.3 F | DIASTOLIC BLOOD PRESSURE: 88 MMHG | SYSTOLIC BLOOD PRESSURE: 130 MMHG | HEART RATE: 88 BPM | OXYGEN SATURATION: 100 % | RESPIRATION RATE: 18 BRPM

## 2025-04-08 DIAGNOSIS — T85.528A DISLODGED GASTROSTOMY TUBE: Primary | ICD-10-CM

## 2025-04-08 PROCEDURE — 99285 EMERGENCY DEPT VISIT HI MDM: CPT

## 2025-04-08 PROCEDURE — 74018 RADEX ABDOMEN 1 VIEW: CPT

## 2025-04-08 PROCEDURE — NC001 PR NO CHARGE: Performed by: RADIOLOGY

## 2025-04-08 PROCEDURE — 99283 EMERGENCY DEPT VISIT LOW MDM: CPT

## 2025-04-08 RX ORDER — ESCITALOPRAM OXALATE 10 MG/1
10 TABLET ORAL DAILY
Qty: 30 TABLET | Refills: 0 | Status: SHIPPED | OUTPATIENT
Start: 2025-04-08 | End: 2025-04-15

## 2025-04-08 NOTE — DISCHARGE INSTRUCTIONS
G tube replaced and confirmed placement. Continue to monitor for any abdominal pain or developing symptoms which would require reevaluation in the ED.

## 2025-04-08 NOTE — ED NOTES
Patient continuing to attempt to pull/remove tubes including ventilator tubing. Restraints released and reapplied. Pt's family member at bedside.     Vera Luna RN  04/08/25 1239

## 2025-04-08 NOTE — CONSULTS
[facility] Electronic Health Record Consult  IR has been consulted to evaluate the patient, determine the appropriate procedure, and whether or not a procedure can and should be performed regarding the care of Hemanth Swanson.    We were consulted by ER concerning dislodge gastrostomy tube, and to possibly perform a replacement if medically appropriate for the patient. The patient is aware that a specialty consultation is taking place, and agrees to the IR Consult on their behalf.     Assessment/Plan:   39 year old male with G tube, dislodged. IR to attempt replacement today.    I spent 10 minutes in medical consultative time evaluating the medical record and imaging of Hemanth Swanson.    Thank you for allowing [facility] to participate in the care of Hemanth Swanson. Please don't hesitate to call or TigerText us with any questions.     Andrea Silva MD

## 2025-04-08 NOTE — ED PROVIDER NOTES
Time reflects when diagnosis was documented in both MDM as applicable and the Disposition within this note       Time User Action Codes Description Comment    4/8/2025  1:19 PM Marija Saenz Add [T85.528A] Dislodged gastrostomy tube           ED Disposition       ED Disposition   Discharge    Condition   Stable    Date/Time   Tue Apr 8, 2025  2:36 PM    Comment   Hemanth Swanson discharge to home/self care.                   Assessment & Plan       Medical Decision Making  Patient is a 39-year-old male presenting for evaluation of dislodged G tube. No redness, bleeding, or irritation noted to insertion area. Attempted to place 14F G tube but was only able to advance the tip of the G tube as immediate resistance was met. Dr. Prather made aware and attempted bedside insertion as well without any success. Reached out to IR to notify of patient and consult was placed. IR came to bedside and successfully placed a 14F catheter with a guided wire. Placement confirmed with KUB as well as gastric aspiration. Abdominal binder placed to prevent patient from removing tube again. Patient tolerated procedure and is resting comfortably in bed. Encouraged follow-up with PCP and Gi and to return for any worsening symptoms.     Amount and/or Complexity of Data Reviewed  Radiology: ordered and independent interpretation performed.     Details: Reviewed     Risk  Prescription drug management.        ED Course as of 04/08/25 1613   Tue Apr 08, 2025   1314 Attempted insertion of 14F G tube without success. Tip is able to pass through insertion site with immediate resistance. Dr. Santamaria made aware and attempted also without success. IR notified and consult placed.   1427 Gastric content aspirated after tube placement.       Medications   iohexol (OMNIPAQUE) 240 MG/ML solution 50 mL (has no administration in time range)       ED Risk Strat Scores                    No data recorded        SBIRT 22yo+      Flowsheet Row Most  Recent Value   Initial Alcohol Screen: US AUDIT-C     1. How often do you have a drink containing alcohol? 0 Filed at: 04/08/2025 1242   2. How many drinks containing alcohol do you have on a typical day you are drinking?  0 Filed at: 04/08/2025 1242   3a. Male UNDER 65: How often do you have five or more drinks on one occasion? 0 Filed at: 04/08/2025 1242   3b. FEMALE Any Age, or MALE 65+: How often do you have 4 or more drinks on one occassion? 0 Filed at: 04/08/2025 1242   Audit-C Score 0 Filed at: 04/08/2025 1242   JEAN MARIE: How many times in the past year have you...    Used an illegal drug or used a prescription medication for non-medical reasons? Never Filed at: 04/08/2025 1242                            History of Present Illness       Chief Complaint   Patient presents with    Feeding Tube Problem     Pt presents to the ed via ems after pulling feeding tube out       Past Medical History:   Diagnosis Date    Anxiety     Bradycardia 06/01/2024    Cancer (HCC)     Depression     Hypernatremia 10/06/2024    Migration of percutaneous endoscopic gastrostomy (PEG) tube (HCC) 09/24/2023    Psychiatric disorder     bipolar    Sepsis (HCC) 08/24/2023      Past Surgical History:   Procedure Laterality Date    IR BIOPSY LYMPH NODE  01/20/2023    IR GASTROSTOMY (G) TUBE CHECK/CHANGE/REINSERTION/UPSIZE  06/18/2024    IR GASTROSTOMY (G) TUBE CHECK/CHANGE/REINSERTION/UPSIZE  3/7/2025    IR GASTROSTOMY TUBE PLACEMENT  09/25/2023    IR LUMBAR PUNCTURE  09/06/2023    IR LUMBAR PUNCTURE  10/25/2024    IR PORT PLACEMENT  03/14/2023    ORCHIECTOMY Left 12/14/2022    Procedure: ORCHIECTOMY INGUINAL;  Surgeon: Flip Michael MD;  Location: [facility];  Service: Urology    PEG W/TRACHEOSTOMY PLACEMENT N/A 09/08/2023    Procedure: TRACHEOSTOMY WITH INSERTION PEG TUBE;  Surgeon: Rudy Mcmanus MD;  Location: [facility];  Service: General    TESTICLE SURGERY        Family History   Problem Relation Age of Onset    Coronary artery  "disease Maternal Aunt     Cancer Father     Diabetes Father     Hypertension Father       Social History     Tobacco Use    Smoking status: Former     Current packs/day: 0.00     Average packs/day: 0.7 packs/day for 22.7 years (15.0 ttl pk-yrs)     Types: Cigarettes     Start date: 2008     Quit date: 2023     Years since quittin.8     Passive exposure: Never    Smokeless tobacco: Never   Vaping Use    Vaping status: Former    Start date: 2018    Quit date: 2023    Substances: Nicotine, THC   Substance Use Topics    Alcohol use: Not Currently     Comment: 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 Not currently using crack cocaine.      E-Cigarette/Vaping    E-Cigarette Use Former User     Start Date 20     Quit Date 23       E-Cigarette/Vaping Substances    Nicotine Yes     THC Yes     CBD No     Flavoring No     Other No     Unknown No       I have reviewed and agree with the history as documented.     Patient is a 39-year-old male with a past medical history including cancer, anxiety, depression, and bipolar disorder. Was brought in via EMS for a dislodged PEG tube. Per patient's niece, patient reported that his PEG tube fell out and denies pulling it out. Patient was seen in the ED 3 days ago for similar situation and had tube replaced at bedside.           Feeding Tube Problem  Associated symptoms: no fever        Review of Systems   Constitutional:  Negative for chills and fever.   Skin:  Positive for wound (G tube).   All other systems reviewed and are negative.          Objective       ED Triage Vitals [25 1243]   Temperature Pulse Blood Pressure Respirations SpO2 Patient Position - Orthostatic VS   (!) 97.3 °F (36.3 °C) 83 136/86 18 99 % Lying      Temp Source Heart Rate Source BP Location FiO2 (%) Pain Score    Oral Monitor Right arm -- --      Vitals      Date and Time " Temp Pulse SpO2 Resp BP Pain Score FACES Pain Rating User   04/08/25 1444 -- 88 100 % 18 130/88 -- -- SR   04/08/25 1243 97.3 °F (36.3 °C) 83 99 % 18 136/86 -- -- LD            Physical Exam  Vitals and nursing note reviewed.   Constitutional:       Appearance: Normal appearance.   Cardiovascular:      Rate and Rhythm: Normal rate.   Abdominal:      General: Abdomen is flat. Bowel sounds are normal.      Palpations: Abdomen is soft.      Tenderness: There is no abdominal tenderness.       Skin:     General: Skin is warm and dry.      Capillary Refill: Capillary refill takes less than 2 seconds.   Neurological:      General: No focal deficit present.      Mental Status: He is alert and oriented to person, place, and time.   Psychiatric:         Mood and Affect: Mood normal.         Behavior: Behavior normal.         Results Reviewed       None            XR abdomen 1 view kub   ED Interpretation by AMBER Elam (04/08 1436)   X-ray confirming G tube placement.       Final Interpretation by Flora Chase MD (04/08 7703)   Contrast seen opacifying the stomach. Opacification the colon probably related to previous contrast administration.               Workstation performed: [id number]             Feeding Tube    Date/Time: 4/8/2025 1:10 PM    Performed by: AMBER Elam  Authorized by: AMBER Elam  Universal Protocol:  Procedure performed by: (Dr. Santamaria)  Consent: Verbal consent obtained.  Risks and benefits: risks, benefits and alternatives were discussed  Consent given by: guardian  Patient understanding: patient states understanding of the procedure being performed  Radiology Images displayed and confirmed. If images not available, report reviewed: imaging studies available  Patient identity confirmed: arm band    Patient location:  ED  Pre-procedure details:     Old tube type:  Gastrostomy    Old tube size:  14 Fr  Indications:     Indications: tube dislodged and tube  "removed by patient    Anesthesia (see MAR for exact dosages):     Anesthesia method:  None  Procedure details:     Patient position:  Supine    Procedure type:  Replacement    Tube type:  Gastrostomy    Tube size:  14 Fr  Post-procedure details:     Patient tolerance of procedure:  Tolerated with difficulty  Comments:      Unable to place tube due to resistance. IR notified.      ED Medication and Procedure Management   Prior to Admission Medications   Prescriptions Last Dose Informant Patient Reported? Taking?   Incontinence Supply Disposable (Comfort Shield Adult Diapers) MISC  Family Member No No   Sig: Use 1 each 4 (four) times a day   NEEDLE, DISP, 18 G 18G X 1\" MISC  Family Member No No   Sig: Use 2 (two) times a week   Oral Hygiene Products (Toothette Swabs/Dentifrice) SWAB  Family Member No No   Sig: Apply to the mouth or throat 3 (three) times a day   QUEtiapine (SEROquel) 200 mg tablet   No No   Si tablet (200 mg total) by Per G Tube route daily at bedtime   QUEtiapine (SEROquel) 50 mg tablet   No No   Sig: Take 1 tablet (50 mg total) by mouth 2 (two) times a day 50 mg per PEG tube twice daily   Syringe/Needle, Disp, (Syringe Luer Slip) 25G X 5/8\" 1 ML MISC  Family Member No No   Sig: Use 2 (two) times a week   acetylcysteine (MUCOMYST) 200 mg/mL nebulizer solution  Family Member No No   Sig: Take 3 mL (600 mg total) by nebulization every 6 (six) hours   apixaban (ELIQUIS) 5 mg  Family Member No No   Si tablet (5 mg total) by Per PEG Tube route 2 (two) times a day   escitalopram (LEXAPRO) 10 mg tablet   No No   Sig: Take 1 tablet (10 mg total) by mouth daily   ipratropium-albuterol (DUO-NEB) 0.5-2.5 mg/3 mL nebulizer solution  Family Member No No   Sig: Take 3 mL by nebulization every 6 (six) hours   Patient not taking: Reported on 3/11/2025      Facility-Administered Medications: None     Patient's Medications   Discharge Prescriptions    No medications on file     No discharge procedures on " file.  ED SEPSIS DOCUMENTATION   Time reflects when diagnosis was documented in both MDM as applicable and the Disposition within this note       Time User Action Codes Description Comment    4/8/2025  1:19 PM Marija Saenz Add [T85.528A] Dislodged gastrostomy tube                  AMBER Elam  04/08/25 3443

## 2025-04-09 ENCOUNTER — TELEPHONE (OUTPATIENT)
Dept: NEUROLOGY | Facility: CLINIC | Age: 40
End: 2025-04-09

## 2025-04-09 NOTE — TELEPHONE ENCOUNTER
Called to check patient in for virtual, was told by sister appointment needed to be rescheduled due patient being in the hospital night prior. Appointment rescheduled for a later date, sister accepted changes.

## 2025-04-09 NOTE — TELEPHONE ENCOUNTER
Patient called the RX Refill Line. Message is being forwarded to the office.     Patients sister returned call.  Please call her back again, there was no documentation in the chart as to what the message was, it just asked for a call back.      Please contact patient at [phone]

## 2025-04-09 NOTE — TELEPHONE ENCOUNTER
Spoke with Dmitry and let her know that you don't prescribe the Olanzapine. She was wondering if you could up his Seroquel at night to 250. They gave him the injectable Olanzapine to help him with his increase aggegation. I also sent his psych referral to [facility] to see if they could get patient in sooner.

## 2025-04-10 DIAGNOSIS — Z93.1 S/P PERCUTANEOUS ENDOSCOPIC GASTROSTOMY (PEG) TUBE PLACEMENT (HCC): ICD-10-CM

## 2025-04-10 DIAGNOSIS — Z51.5 PALLIATIVE CARE PATIENT: ICD-10-CM

## 2025-04-10 DIAGNOSIS — Z99.11 VENTILATOR DEPENDENT (HCC): Primary | Chronic | ICD-10-CM

## 2025-04-10 DIAGNOSIS — Z93.0 TRACHEOSTOMY IN PLACE (HCC): ICD-10-CM

## 2025-04-10 RX ORDER — QUETIAPINE FUMARATE 50 MG/1
75 TABLET, FILM COATED ORAL 2 TIMES DAILY
Qty: 90 TABLET | Refills: 0 | Status: SHIPPED | OUTPATIENT
Start: 2025-04-10 | End: 2025-04-15

## 2025-04-10 NOTE — TELEPHONE ENCOUNTER
Have her continue to give him 200 mg at nighttime but increase the morning and mid-day doses to 75 mg daily (one and a half of the 50 mg tablets)

## 2025-04-14 ENCOUNTER — TELEPHONE (OUTPATIENT)
Dept: FAMILY MEDICINE CLINIC | Facility: CLINIC | Age: 40
End: 2025-04-14

## 2025-04-14 NOTE — TELEPHONE ENCOUNTER
[facility] Coroner's office called to speak with Dr. Douglas regarding the passing of patient.    They would like to speak to Dr. Douglas to get more detailed information on patient's health history.    I explained to Gabriel that Dr. Douglas was out of the office today but would be back in the office on 4/15. He was understanding and will await for a phone call from patient's PCP.    I did not obtain date of death etc., once all of this is obtained, I can alex patient's chart as .    Gabriel Patel  [phone]    Radha Stanford

## 2025-04-14 NOTE — TELEPHONE ENCOUNTER
Spoke to Gabriel at the number provided below and provided history/answered questions the best that I could.  The 's office will reach out with the autopsy results.    Date of death 4/13/25

## 2025-04-15 ENCOUNTER — TELEPHONE (OUTPATIENT)
Age: 40
End: 2025-04-15

## 2025-04-17 NOTE — TELEPHONE ENCOUNTER
I spoke with [facility] respiratory therapist Charley.  They are working on obtaining the data.  They said is not a simple process.    I did give her the fax number to the 's office along with the number listed below    Thanks  SK

## 2025-04-17 NOTE — TELEPHONE ENCOUNTER
Dr. Barksdale    04/15/25 Follow Up    Please advise on RT follow up and vent data.    Gabriel, [facility] Coroner  [phone]

## 2025-04-17 NOTE — TELEPHONE ENCOUNTER
Advised Gabriel as per Dr. Barksdale notation.  Gabriel provided fax number again and will call back Monday if data not received.     Fax [phone]

## 2025-04-24 NOTE — TELEPHONE ENCOUNTER
Let her know that the patient had an autopsy done but I haven't received a copy of the report- would need to call the 's office.

## 2025-04-24 NOTE — TELEPHONE ENCOUNTER
Wendi Eastman from [employer] called to verify pt's date of death as 4/13/2025 and to inquire as to the cause of death.  Please reach out to her at [phone].

## (undated) DEVICE — VIOLET BRAIDED (POLYGLACTIN 910), SYNTHETIC ABSORBABLE SUTURE: Brand: COATED VICRYL

## (undated) DEVICE — 3000CC GUARDIAN II: Brand: GUARDIAN

## (undated) DEVICE — PACK GENERAL LF

## (undated) DEVICE — SUT MONOCRYL 4-0 PS-2 27 IN Y426H

## (undated) DEVICE — ASTOUND STANDARD SURGICAL GOWN, XL: Brand: CONVERTORS

## (undated) DEVICE — TOWEL SURG XR DETECT GREEN STRL RFD

## (undated) DEVICE — SUT MONOCRYL 3-0 PS-2 27 IN Y427H

## (undated) DEVICE — ELECTRODE NEEDLE E-Z CLEAN 2.75IN 7CM -0013

## (undated) DEVICE — NEEDLE 25G X 1 1/2

## (undated) DEVICE — INTENDED FOR TISSUE SEPARATION, AND OTHER PROCEDURES THAT REQUIRE A SHARP SURGICAL BLADE TO PUNCTURE OR CUT.: Brand: BARD-PARKER SAFETY BLADES SIZE 15, STERILE

## (undated) DEVICE — SYRINGE 10ML LL

## (undated) DEVICE — TIBURON SPLIT SHEET: Brand: CONVERTORS

## (undated) DEVICE — ROSEBUD DISSECTORS: Brand: DEROYAL

## (undated) DEVICE — DRAPE UTILITY

## (undated) DEVICE — GLOVE INDICATOR PI UNDERGLOVE SZ 7.5 BLUE

## (undated) DEVICE — BETHLEHEM UNIVERSAL MINOR GEN: Brand: CARDINAL HEALTH

## (undated) DEVICE — BASIC DOUBLE BASIN 2-LF: Brand: MEDLINE INDUSTRIES, INC.

## (undated) DEVICE — SUT SILK 2-0 30 IN SA85H

## (undated) DEVICE — MEDI-VAC YANKAUER SUCTION HANDLE W/STRAIGHT TIP & CONTROL VENT: Brand: CARDINAL HEALTH

## (undated) DEVICE — GLOVE SRG BIOGEL 7

## (undated) DEVICE — SYRINGE 10ML LL CONTROL TOP

## (undated) DEVICE — SUT VICRYL 3-0 SH 27 IN J416H

## (undated) DEVICE — SUT PROLENE 2-0 RB-1/RB-1 36 IN 8559H

## (undated) DEVICE — STERILE SURGICAL LUBRICANT,  TUBE: Brand: SURGILUBE

## (undated) DEVICE — NEPTUNE E-SEP SMOKE EVACUATION PENCIL, COATED, 70MM BLADE, PUSH BUTTON SWITCH: Brand: NEPTUNE E-SEP

## (undated) DEVICE — INTENDED FOR TISSUE SEPARATION, AND OTHER PROCEDURES THAT REQUIRE A SHARP SURGICAL BLADE TO PUNCTURE OR CUT.: Brand: BARD-PARKER ® CARBON RIB-BACK BLADES

## (undated) DEVICE — PENROSE DRAIN, 18 X 3 8: Brand: CARDINAL HEALTH

## (undated) DEVICE — DRAIN SPONGES,6 PLY: Brand: EXCILON

## (undated) DEVICE — SUT PROLENE 2-0 CT-1 30 IN 8423H

## (undated) DEVICE — ADHESIVE SKIN HIGH VISCOSITY EXOFIN 1ML

## (undated) DEVICE — PLUMEPEN PRO 10FT

## (undated) DEVICE — TUBING SUCTION 5MM X 12 FT

## (undated) DEVICE — RADIOLOGY STERILE LABELS: Brand: CENTURION

## (undated) DEVICE — GLOVE SRG BIOGEL 8

## (undated) DEVICE — ADULT FLEXIBLE TRACHEOSTOMY TUBE WITH TAPERGUARD  (TM) CUFF REUSABLE INNER CANNULA: Brand: SHILEY

## (undated) DEVICE — CHLORAPREP HI-LITE 26ML ORANGE

## (undated) DEVICE — SUT ETHILON 3-0 FSL 30 IN 1671H

## (undated) DEVICE — ARYGLE SUCTION CATHETER WITH STRAIGHT CONNECTOR COIL PACKED 14 FR/ CH: Brand: ARGYLE

## (undated) DEVICE — 3M™ STERI-STRIP™ REINFORCED ADHESIVE SKIN CLOSURES, R1546, 1/4 IN X 4 IN (6 MM X 100 MM), 10 STRIPS/ENVELOPE: Brand: 3M™ STERI-STRIP™

## (undated) DEVICE — SPONGE LAP 18 X 18 IN

## (undated) DEVICE — DALE TRACHEOSTOMY TUBE HOLDER, 1" WIDE, FITS UP TO 19.5" NECK.: Brand: DALE 240 BLUE